# Patient Record
Sex: MALE | Race: WHITE | NOT HISPANIC OR LATINO
[De-identification: names, ages, dates, MRNs, and addresses within clinical notes are randomized per-mention and may not be internally consistent; named-entity substitution may affect disease eponyms.]

---

## 2017-01-19 ENCOUNTER — APPOINTMENT (OUTPATIENT)
Dept: INTERNAL MEDICINE | Facility: CLINIC | Age: 82
End: 2017-01-19

## 2017-01-25 ENCOUNTER — APPOINTMENT (OUTPATIENT)
Dept: CT IMAGING | Facility: CLINIC | Age: 82
End: 2017-01-25

## 2017-01-25 ENCOUNTER — OUTPATIENT (OUTPATIENT)
Dept: OUTPATIENT SERVICES | Facility: HOSPITAL | Age: 82
LOS: 1 days | End: 2017-01-25
Payer: MEDICARE

## 2017-01-25 DIAGNOSIS — Z00.8 ENCOUNTER FOR OTHER GENERAL EXAMINATION: ICD-10-CM

## 2017-01-25 PROCEDURE — 71250 CT THORAX DX C-: CPT

## 2017-01-31 ENCOUNTER — OUTPATIENT (OUTPATIENT)
Dept: OUTPATIENT SERVICES | Facility: HOSPITAL | Age: 82
LOS: 1 days | End: 2017-01-31
Payer: MEDICARE

## 2017-01-31 VITALS
TEMPERATURE: 98 F | HEIGHT: 66 IN | SYSTOLIC BLOOD PRESSURE: 158 MMHG | OXYGEN SATURATION: 100 % | RESPIRATION RATE: 18 BRPM | DIASTOLIC BLOOD PRESSURE: 64 MMHG | WEIGHT: 186.95 LBS | HEART RATE: 60 BPM

## 2017-01-31 VITALS
WEIGHT: 186.95 LBS | OXYGEN SATURATION: 100 % | TEMPERATURE: 98 F | DIASTOLIC BLOOD PRESSURE: 64 MMHG | HEIGHT: 66 IN | SYSTOLIC BLOOD PRESSURE: 158 MMHG | RESPIRATION RATE: 12 BRPM | HEART RATE: 53 BPM

## 2017-01-31 DIAGNOSIS — Z98.890 OTHER SPECIFIED POSTPROCEDURAL STATES: Chronic | ICD-10-CM

## 2017-01-31 DIAGNOSIS — Z01.810 ENCOUNTER FOR PREPROCEDURAL CARDIOVASCULAR EXAMINATION: ICD-10-CM

## 2017-01-31 DIAGNOSIS — Z98.62 PERIPHERAL VASCULAR ANGIOPLASTY STATUS: Chronic | ICD-10-CM

## 2017-01-31 LAB
ANION GAP SERPL CALC-SCNC: 16 MMOL/L — SIGNIFICANT CHANGE UP (ref 5–17)
APTT BLD: 36.8 SEC — SIGNIFICANT CHANGE UP (ref 27.5–37.4)
BUN SERPL-MCNC: 61 MG/DL — HIGH (ref 8–20)
CALCIUM SERPL-MCNC: 8.8 MG/DL — SIGNIFICANT CHANGE UP (ref 8.6–10.2)
CHLORIDE SERPL-SCNC: 102 MMOL/L — SIGNIFICANT CHANGE UP (ref 98–107)
CHOLEST SERPL-MCNC: 147 MG/DL — SIGNIFICANT CHANGE UP (ref 110–199)
CO2 SERPL-SCNC: 21 MMOL/L — LOW (ref 22–29)
CREAT SERPL-MCNC: 3.57 MG/DL — HIGH (ref 0.5–1.3)
GLUCOSE SERPL-MCNC: 113 MG/DL — SIGNIFICANT CHANGE UP (ref 70–115)
HCT VFR BLD CALC: 30.4 % — LOW (ref 42–52)
HDLC SERPL-MCNC: 46 MG/DL — LOW
HGB BLD-MCNC: 10.4 G/DL — LOW (ref 14–18)
INR BLD: 1.12 RATIO — SIGNIFICANT CHANGE UP (ref 0.88–1.16)
LIPID PNL WITH DIRECT LDL SERPL: 69 MG/DL — SIGNIFICANT CHANGE UP
MCHC RBC-ENTMCNC: 29 PG — SIGNIFICANT CHANGE UP (ref 27–31)
MCHC RBC-ENTMCNC: 34.2 G/DL — SIGNIFICANT CHANGE UP (ref 32–36)
MCV RBC AUTO: 84.7 FL — SIGNIFICANT CHANGE UP (ref 80–94)
PLATELET # BLD AUTO: 216 K/UL — SIGNIFICANT CHANGE UP (ref 150–400)
POTASSIUM SERPL-MCNC: 4.4 MMOL/L — SIGNIFICANT CHANGE UP (ref 3.5–5.3)
POTASSIUM SERPL-SCNC: 4.4 MMOL/L — SIGNIFICANT CHANGE UP (ref 3.5–5.3)
PROTHROM AB SERPL-ACNC: 12.3 SEC — SIGNIFICANT CHANGE UP (ref 10–13.1)
RBC # BLD: 3.59 M/UL — LOW (ref 4.6–6.2)
RBC # FLD: 14 % — SIGNIFICANT CHANGE UP (ref 11–15.6)
SODIUM SERPL-SCNC: 139 MMOL/L — SIGNIFICANT CHANGE UP (ref 135–145)
TOTAL CHOLESTEROL/HDL RATIO MEASUREMENT: 3 RATIO — LOW (ref 3.4–9.6)
TRIGL SERPL-MCNC: 161 MG/DL — SIGNIFICANT CHANGE UP (ref 10–200)
WBC # BLD: 10.29 K/UL — SIGNIFICANT CHANGE UP (ref 4.8–10.8)
WBC # FLD AUTO: 10.29 K/UL — SIGNIFICANT CHANGE UP (ref 4.8–10.8)

## 2017-01-31 PROCEDURE — 85610 PROTHROMBIN TIME: CPT

## 2017-01-31 PROCEDURE — 80061 LIPID PANEL: CPT

## 2017-01-31 PROCEDURE — G0463: CPT

## 2017-01-31 PROCEDURE — 85730 THROMBOPLASTIN TIME PARTIAL: CPT

## 2017-01-31 PROCEDURE — 36415 COLL VENOUS BLD VENIPUNCTURE: CPT

## 2017-01-31 PROCEDURE — 85027 COMPLETE CBC AUTOMATED: CPT

## 2017-01-31 PROCEDURE — 93010 ELECTROCARDIOGRAM REPORT: CPT

## 2017-01-31 PROCEDURE — 93005 ELECTROCARDIOGRAM TRACING: CPT

## 2017-01-31 PROCEDURE — 80048 BASIC METABOLIC PNL TOTAL CA: CPT

## 2017-01-31 NOTE — H&P CARDIOLOGY - ATTENDING COMMENTS
I advised him to hold the glipizide the day of the procedure  I also advised him to I advised him to hold the glipizide the day of the procedure  I also advised him to arrive 2 hours early for hydration with NAHCO3 in 1/2 saline over 2 hours at 150cc hr

## 2017-01-31 NOTE — ASU PATIENT PROFILE, ADULT - PSH
H/O circumcision  at  age  65  H/O left knee surgery H/O angioplasty  2013,  no  intervention  H/O circumcision  at  age  65  H/O left knee surgery

## 2017-01-31 NOTE — ASU PATIENT PROFILE, ADULT - PMH
Arrhythmia    AV block, 1st degree    CAD (coronary artery disease)    CKD (chronic kidney disease)  stage IV  DM (diabetes mellitus)    RICHARDS (dyspnea on exertion)    HTN (hypertension)    VT (ventricular tachycardia)

## 2017-01-31 NOTE — H&P CARDIOLOGY - HISTORY OF PRESENT ILLNESS
This is a 83 yo male with PMHX that includes HTN, HLD, DM, Stage 4 kidney disease.  He has complaints of  RICHARDS that has progressively been getting worse.  He had a prior cath in 2013 with minimal disease 20% rca , lad 20%, 20% lcx. .  Today he is here for PST in anticipation of having a RHC and LHC.  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     No recent ECHO or ST. This is a 81 yo male with PMHX that includes HTN, HLD, DM, Stage 4 kidney disease.  He has complaints of  RICHARDS that has progressively been getting worse.  He had a prior cath in 2013 with minimal disease 20% rca , lad 20%, 20% lcx. .  Today he is here for PST in anticipation of having a RHC and LHC.  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     No recent ECHO or ST.  He is also under the care of Dr Avendano Neph[facility]

## 2017-02-01 DIAGNOSIS — I25.10 ATHEROSCLEROTIC HEART DISEASE OF NATIVE CORONARY ARTERY WITHOUT ANGINA PECTORIS: ICD-10-CM

## 2017-02-01 DIAGNOSIS — Z01.810 ENCOUNTER FOR PREPROCEDURAL CARDIOVASCULAR EXAMINATION: ICD-10-CM

## 2017-02-03 ENCOUNTER — OUTPATIENT (OUTPATIENT)
Dept: OUTPATIENT SERVICES | Facility: HOSPITAL | Age: 82
LOS: 1 days | Discharge: HOME | End: 2017-02-03
Payer: MEDICARE

## 2017-02-03 ENCOUNTER — TRANSCRIPTION ENCOUNTER (OUTPATIENT)
Age: 82
End: 2017-02-03

## 2017-02-03 VITALS — RESPIRATION RATE: 18 BRPM | SYSTOLIC BLOOD PRESSURE: 180 MMHG | DIASTOLIC BLOOD PRESSURE: 85 MMHG | HEART RATE: 66 BPM

## 2017-02-03 VITALS
RESPIRATION RATE: 18 BRPM | SYSTOLIC BLOOD PRESSURE: 180 MMHG | TEMPERATURE: 98 F | HEART RATE: 57 BPM | OXYGEN SATURATION: 100 % | DIASTOLIC BLOOD PRESSURE: 82 MMHG

## 2017-02-03 DIAGNOSIS — Z98.890 OTHER SPECIFIED POSTPROCEDURAL STATES: Chronic | ICD-10-CM

## 2017-02-03 DIAGNOSIS — I47.2 VENTRICULAR TACHYCARDIA: ICD-10-CM

## 2017-02-03 DIAGNOSIS — Z98.62 PERIPHERAL VASCULAR ANGIOPLASTY STATUS: Chronic | ICD-10-CM

## 2017-02-03 LAB
ALBUMIN SERPL ELPH-MCNC: 3.5 G/DL — SIGNIFICANT CHANGE UP (ref 3.3–5.2)
ANION GAP SERPL CALC-SCNC: 14 MMOL/L — SIGNIFICANT CHANGE UP (ref 5–17)
BUN SERPL-MCNC: 64 MG/DL — HIGH (ref 8–20)
CALCIUM SERPL-MCNC: 9 MG/DL — SIGNIFICANT CHANGE UP (ref 8.6–10.2)
CHLORIDE SERPL-SCNC: 106 MMOL/L — SIGNIFICANT CHANGE UP (ref 98–107)
CO2 SERPL-SCNC: 22 MMOL/L — SIGNIFICANT CHANGE UP (ref 22–29)
CREAT SERPL-MCNC: 3.24 MG/DL — HIGH (ref 0.5–1.3)
GLUCOSE SERPL-MCNC: 220 MG/DL — HIGH (ref 70–115)
PHOSPHATE SERPL-MCNC: 4 MG/DL — SIGNIFICANT CHANGE UP (ref 2.4–4.7)
POTASSIUM SERPL-MCNC: 3.9 MMOL/L — SIGNIFICANT CHANGE UP (ref 3.5–5.3)
POTASSIUM SERPL-SCNC: 3.9 MMOL/L — SIGNIFICANT CHANGE UP (ref 3.5–5.3)
SODIUM SERPL-SCNC: 142 MMOL/L — SIGNIFICANT CHANGE UP (ref 135–145)

## 2017-02-03 PROCEDURE — C1894: CPT

## 2017-02-03 PROCEDURE — C1769: CPT

## 2017-02-03 PROCEDURE — C1889: CPT

## 2017-02-03 PROCEDURE — 80069 RENAL FUNCTION PANEL: CPT

## 2017-02-03 PROCEDURE — C1887: CPT

## 2017-02-03 PROCEDURE — 93460 R&L HRT ART/VENTRICLE ANGIO: CPT

## 2017-02-03 PROCEDURE — 99218: CPT

## 2017-02-03 PROCEDURE — 36415 COLL VENOUS BLD VENIPUNCTURE: CPT

## 2017-02-03 RX ORDER — DIPHENHYDRAMINE HCL 50 MG
25 CAPSULE ORAL ONCE
Qty: 0 | Refills: 0 | Status: COMPLETED | OUTPATIENT
Start: 2017-02-03 | End: 2017-02-03

## 2017-02-03 RX ORDER — ACETYLCYSTEINE 200 MG/ML
1200 VIAL (ML) MISCELLANEOUS EVERY 12 HOURS
Qty: 0 | Refills: 0 | Status: DISCONTINUED | OUTPATIENT
Start: 2017-02-03 | End: 2017-02-03

## 2017-02-03 RX ORDER — FAMOTIDINE 10 MG/ML
20 INJECTION INTRAVENOUS DAILY
Qty: 0 | Refills: 0 | Status: DISCONTINUED | OUTPATIENT
Start: 2017-02-03 | End: 2017-02-18

## 2017-02-03 RX ORDER — SODIUM CHLORIDE 9 MG/ML
1000 INJECTION, SOLUTION INTRAVENOUS
Qty: 0 | Refills: 0 | Status: DISCONTINUED | OUTPATIENT
Start: 2017-02-03 | End: 2017-02-03

## 2017-02-03 RX ORDER — ACETYLCYSTEINE 200 MG/ML
2 VIAL (ML) MISCELLANEOUS
Qty: 8 | Refills: 0
Start: 2017-02-03 | End: 2017-02-05

## 2017-02-03 RX ORDER — CLOPIDOGREL BISULFATE 75 MG/1
75 TABLET, FILM COATED ORAL DAILY
Qty: 0 | Refills: 0 | Status: DISCONTINUED | OUTPATIENT
Start: 2017-02-03 | End: 2017-02-18

## 2017-02-03 RX ORDER — SODIUM BICARBONATE 1 MEQ/ML
0.15 SYRINGE (ML) INTRAVENOUS
Qty: 150 | Refills: 0 | Status: DISCONTINUED | OUTPATIENT
Start: 2017-02-03 | End: 2017-02-18

## 2017-02-03 RX ORDER — FAMOTIDINE 10 MG/ML
1 INJECTION INTRAVENOUS
Qty: 30 | Refills: 0
Start: 2017-02-03 | End: 2017-03-05

## 2017-02-03 RX ORDER — CLOPIDOGREL BISULFATE 75 MG/1
75 TABLET, FILM COATED ORAL
Qty: 6750 | Refills: 3
Start: 2017-02-03 | End: 2018-01-28

## 2017-02-03 RX ORDER — ASPIRIN/CALCIUM CARB/MAGNESIUM 324 MG
243 TABLET ORAL ONCE
Qty: 0 | Refills: 0 | Status: COMPLETED | OUTPATIENT
Start: 2017-02-03 | End: 2017-02-03

## 2017-02-03 RX ORDER — ACETYLCYSTEINE 200 MG/ML
1200 VIAL (ML) MISCELLANEOUS ONCE
Qty: 0 | Refills: 0 | Status: COMPLETED | OUTPATIENT
Start: 2017-02-03 | End: 2017-02-03

## 2017-02-03 RX ORDER — DIPHENHYDRAMINE HCL 50 MG
25 CAPSULE ORAL
Qty: 525 | Refills: 0
Start: 2017-02-03 | End: 2017-02-10

## 2017-02-03 RX ORDER — ACETYLCYSTEINE 200 MG/ML
1200 VIAL (ML) MISCELLANEOUS EVERY 12 HOURS
Qty: 0 | Refills: 0 | Status: DISCONTINUED | OUTPATIENT
Start: 2017-02-03 | End: 2017-02-18

## 2017-02-03 RX ORDER — CLOPIDOGREL BISULFATE 75 MG/1
1 TABLET, FILM COATED ORAL
Qty: 90 | Refills: 3
Start: 2017-02-03 | End: 2018-01-28

## 2017-02-03 RX ADMIN — Medication 85 MEQ/KG/HR: at 16:54

## 2017-02-03 RX ADMIN — SODIUM CHLORIDE 150 MILLILITER(S): 9 INJECTION, SOLUTION INTRAVENOUS at 09:57

## 2017-02-03 RX ADMIN — Medication 85 MEQ/KG/HR: at 18:01

## 2017-02-03 RX ADMIN — Medication 1200 MILLIGRAM(S): at 18:01

## 2017-02-03 RX ADMIN — Medication 1200 MILLIGRAM(S): at 11:31

## 2017-02-03 RX ADMIN — SODIUM CHLORIDE 150 MILLILITER(S): 9 INJECTION, SOLUTION INTRAVENOUS at 11:31

## 2017-02-03 RX ADMIN — Medication 40 MILLIGRAM(S): at 19:32

## 2017-02-03 RX ADMIN — Medication 85 MEQ/KG/HR: at 11:31

## 2017-02-03 RX ADMIN — Medication 25 MILLIGRAM(S): at 19:28

## 2017-02-03 RX ADMIN — FAMOTIDINE 20 MILLIGRAM(S): 10 INJECTION INTRAVENOUS at 19:28

## 2017-02-03 RX ADMIN — CLOPIDOGREL BISULFATE 75 MILLIGRAM(S): 75 TABLET, FILM COATED ORAL at 16:54

## 2017-02-03 RX ADMIN — Medication 243 MILLIGRAM(S): at 09:46

## 2017-02-03 NOTE — DISCHARGE NOTE ADULT - CARE PLAN
Principal Discharge DX:	CAD (coronary artery disease)  Goal:	optimal cardiac function  Instructions for follow-up, activity and diet:	Choose lean meats and poultry without skin and prepare them without added saturated and trans fat.  Eat fish at least twice a week. Recent research shows that eating oily fish containing omega-3 fatty acids (for example, salmon, trout and herring) may help lower your risk of death from coronary artery disease.  Select fat-free, 1 percent fat and low-fat dairy products.  Cut back on foods containing partially hydrogenated vegetable oils to reduce trans fat in your diet.   To lower cholesterol, reduce saturated fat to no more than 5 to 6 percent of total calories. For someone eating 2,000 calories a day, that’s about 13 grams of saturated fat.  Cut back on beverages and foods with added sugars.  Choose and prepare foods with little or no salt. To lower blood pressure, aim to eat no more than 2,400 milligrams of sodium per day. Reducing daily intake to 1,500 mg is desirable because it can lower blood pressure even further.  If you drink alcohol, drink in moderation. That means one drink per day if you’re a woman and two drinks  per day if you’re a man.  Follow the American Heart Association recommendations when you eat out, and keep an eye on your portion sizes.  ADA food choices Principal Discharge DX:	CAD (coronary artery disease)  Goal:	optimal cardiac function  Instructions for follow-up, activity and diet:	Choose lean meats and poultry without skin and prepare them without added saturated and trans fat.  Eat fish at least twice a week. Recent research shows that eating oily fish containing omega-3 fatty acids (for example, salmon, trout and herring) may help lower your risk of death from coronary artery disease.  Select fat-free, 1 percent fat and low-fat dairy products.  Cut back on foods containing partially hydrogenated vegetable oils to reduce trans fat in your diet.   To lower cholesterol, reduce saturated fat to no more than 5 to 6 percent of total calories. For someone eating 2,000 calories a day, that’s about 13 grams of saturated fat.  Cut back on beverages and foods with added sugars.  Choose and prepare foods with little or no salt. To lower blood pressure, aim to eat no more than 2,400 milligrams of sodium per day. Reducing daily intake to 1,500 mg is desirable because it can lower blood pressure even further.  If you drink alcohol, drink in moderation. That means one drink per day if you’re a woman and two drinks  per day if you’re a man.  Follow the American Heart Association recommendations when you eat out, and keep an eye on your portion sizes.  ADA food choices  Instructions for follow-up, activity and diet:	P/U Mucomyst prescription at [facility]  P/U plavix at [facility] that was electronically sent Principal Discharge DX:	CAD (coronary artery disease)  Goal:	optimal cardiac function  Instructions for follow-up, activity and diet:	Choose lean meats and poultry without skin and prepare them without added saturated and trans fat.  Eat fish at least twice a week. Recent research shows that eating oily fish containing omega-3 fatty acids (for example, salmon, trout and herring) may help lower your risk of death from coronary artery disease.  Select fat-free, 1 percent fat and low-fat dairy products.  Cut back on foods containing partially hydrogenated vegetable oils to reduce trans fat in your diet.   To lower cholesterol, reduce saturated fat to no more than 5 to 6 percent of total calories. For someone eating 2,000 calories a day, that’s about 13 grams of saturated fat.  Cut back on beverages and foods with added sugars.  Choose and prepare foods with little or no salt. To lower blood pressure, aim to eat no more than 2,400 milligrams of sodium per day. Reducing daily intake to 1,500 mg is desirable because it can lower blood pressure even further.  If you drink alcohol, drink in moderation. That means one drink per day if you’re a woman and two drinks  per day if you’re a man.  Follow the American Heart Association recommendations when you eat out, and keep an eye on your portion sizes.  ADA food choices  Instructions for follow-up, activity and diet:	P/U Mucomyst prescription at [facility]  P/U plavix at [facility] that was electronically sent Principal Discharge DX:	CAD (coronary artery disease)  Goal:	optimal cardiac function  Instructions for follow-up, activity and diet:	Choose lean meats and poultry without skin and prepare them without added saturated and trans fat.  Eat fish at least twice a week. Recent research shows that eating oily fish containing omega-3 fatty acids (for example, salmon, trout and herring) may help lower your risk of death from coronary artery disease.  Select fat-free, 1 percent fat and low-fat dairy products.  Cut back on foods containing partially hydrogenated vegetable oils to reduce trans fat in your diet.   To lower cholesterol, reduce saturated fat to no more than 5 to 6 percent of total calories. For someone eating 2,000 calories a day, that’s about 13 grams of saturated fat.  Cut back on beverages and foods with added sugars.  Choose and prepare foods with little or no salt. To lower blood pressure, aim to eat no more than 2,400 milligrams of sodium per day. Reducing daily intake to 1,500 mg is desirable because it can lower blood pressure even further.  If you drink alcohol, drink in moderation. That means one drink per day if you’re a woman and two drinks  per day if you’re a man.  Follow the American Heart Association recommendations when you eat out, and keep an eye on your portion sizes.  ADA food choices  Instructions for follow-up, activity and diet:	P/U Mucomyst prescription at [facility]  P/U plavix at [facility] that was electronically sent Principal Discharge DX:	CAD (coronary artery disease)  Goal:	optimal cardiac function  Instructions for follow-up, activity and diet:	Choose lean meats and poultry without skin and prepare them without added saturated and trans fat.  Eat fish at least twice a week. Recent research shows that eating oily fish containing omega-3 fatty acids (for example, salmon, trout and herring) may help lower your risk of death from coronary artery disease.  Select fat-free, 1 percent fat and low-fat dairy products.  Cut back on foods containing partially hydrogenated vegetable oils to reduce trans fat in your diet.   To lower cholesterol, reduce saturated fat to no more than 5 to 6 percent of total calories. For someone eating 2,000 calories a day, that’s about 13 grams of saturated fat.  Cut back on beverages and foods with added sugars.  Choose and prepare foods with little or no salt. To lower blood pressure, aim to eat no more than 2,400 milligrams of sodium per day. Reducing daily intake to 1,500 mg is desirable because it can lower blood pressure even further.  If you drink alcohol, drink in moderation. That means one drink per day if you’re a woman and two drinks  per day if you’re a man.  Follow the American Heart Association recommendations when you eat out, and keep an eye on your portion sizes.  ADA food choices  Instructions for follow-up, activity and diet:	P/U Mucomyst prescription at [facility]  P/U plavix at [facility] that was electronically sent Principal Discharge DX:	CAD (coronary artery disease)  Goal:	optimal cardiac function  Instructions for follow-up, activity and diet:	Choose lean meats and poultry without skin and prepare them without added saturated and trans fat.  Eat fish at least twice a week. Recent research shows that eating oily fish containing omega-3 fatty acids (for example, salmon, trout and herring) may help lower your risk of death from coronary artery disease.  Select fat-free, 1 percent fat and low-fat dairy products.  Cut back on foods containing partially hydrogenated vegetable oils to reduce trans fat in your diet.   To lower cholesterol, reduce saturated fat to no more than 5 to 6 percent of total calories. For someone eating 2,000 calories a day, that’s about 13 grams of saturated fat.  Cut back on beverages and foods with added sugars.  Choose and prepare foods with little or no salt. To lower blood pressure, aim to eat no more than 2,400 milligrams of sodium per day. Reducing daily intake to 1,500 mg is desirable because it can lower blood pressure even further.  If you drink alcohol, drink in moderation. That means one drink per day if you’re a woman and two drinks  per day if you’re a man.  Follow the American Heart Association recommendations when you eat out, and keep an eye on your portion sizes.  ADA food choices  Instructions for follow-up, activity and diet:	P/U Mucomyst prescription at [facility]  P/U plavix at [facility] that was electronically sent Principal Discharge DX:	CAD (coronary artery disease)  Goal:	optimal cardiac function  Instructions for follow-up, activity and diet:	Choose lean meats and poultry without skin and prepare them without added saturated and trans fat.  Eat fish at least twice a week. Recent research shows that eating oily fish containing omega-3 fatty acids (for example, salmon, trout and herring) may help lower your risk of death from coronary artery disease.  Select fat-free, 1 percent fat and low-fat dairy products.  Cut back on foods containing partially hydrogenated vegetable oils to reduce trans fat in your diet.   To lower cholesterol, reduce saturated fat to no more than 5 to 6 percent of total calories. For someone eating 2,000 calories a day, that’s about 13 grams of saturated fat.  Cut back on beverages and foods with added sugars.  Choose and prepare foods with little or no salt. To lower blood pressure, aim to eat no more than 2,400 milligrams of sodium per day. Reducing daily intake to 1,500 mg is desirable because it can lower blood pressure even further.  If you drink alcohol, drink in moderation. That means one drink per day if you’re a woman and two drinks  per day if you’re a man.  Follow the American Heart Association recommendations when you eat out, and keep an eye on your portion sizes.  ADA food choices  Instructions for follow-up, activity and diet:	P/U Mucomyst prescription at [facility]  P/U plavix at [facility] that was electronically sent

## 2017-02-03 NOTE — DISCHARGE NOTE ADULT - PATIENT PORTAL LINK FT
“You can access the FollowHealth Patient Portal, offered by [facility], by registering with the following website: [email or web address]”

## 2017-02-03 NOTE — DISCHARGE NOTE ADULT - CARE PROVIDER_API CALL
Mango Peterson), Cardiovascular Disease; Internal Medicine  [address] [place] [address]  Phone: [phone]  Fax: [phone]

## 2017-02-03 NOTE — DISCHARGE NOTE ADULT - NS AS ACTIVITY OBS
Walking-Indoors allowed/Do not make important decisions/No Heavy lifting/straining/Do not drive or operate machinery

## 2017-02-03 NOTE — DISCHARGE NOTE ADULT - MEDICATION SUMMARY - MEDICATIONS TO TAKE
I will START or STAY ON the medications listed below when I get home from the hospital:    aspirin 81 mg oral tablet  -- 1 tab(s) by mouth once a day  -- Indication: For Ventricular tachycardia    flecainide 100 mg oral tablet  -- 1 tab(s) by mouth every 12 hours  -- Indication: For Ventricular tachycardia    glipiZIDE 5 mg oral tablet  -- 1 tab(s) by mouth once a day  -- Indication: For Ventricular tachycardia    acetylcysteine 600 mg oral capsule  -- 2 cap(s) by mouth 2 times a day  -- Take with food or milk.    -- Indication: For Ventricular tachycardia    atenolol 25 mg oral tablet  -- 0.5 tab(s) by mouth once a day  -- Indication: For Ventricular tachycardia    NIFEdipine 60 mg oral tablet, extended release  -- 1 tab(s) by mouth once a day  -- Indication: For Ventricular tachycardia    torsemide 10 mg oral tablet  -- 1 tab(s) by mouth once a day  -- Indication: For Ventricular tachycardia I will START or STAY ON the medications listed below when I get home from the hospital:    plavix  -- 75 milligram(s) by mouth once a day MDD:75  -- Indication: For CAD (coronary artery disease)    aspirin 81 mg oral tablet  -- 1 tab(s) by mouth once a day  -- Indication: For Ventricular tachycardia    flecainide 100 mg oral tablet  -- 1 tab(s) by mouth every 12 hours  -- Indication: For Ventricular tachycardia    glipiZIDE 5 mg oral tablet  -- 1 tab(s) by mouth once a day  -- Indication: For Ventricular tachycardia    acetylcysteine 600 mg oral capsule  -- 2 cap(s) by mouth 2 times a day  -- Take with food or milk.    -- Indication: For Ventricular tachycardia    Plavix 75 mg oral tablet  -- 1 tab(s) by mouth once a day MDD:75  -- Do not take aspirin or aspirin containing products without knowledge and consent of your physician.    -- Indication: For CAD (coronary artery disease)    atenolol 25 mg oral tablet  -- 0.5 tab(s) by mouth once a day  -- Indication: For Ventricular tachycardia    NIFEdipine 60 mg oral tablet, extended release  -- 1 tab(s) by mouth once a day  -- Indication: For Ventricular tachycardia    torsemide 10 mg oral tablet  -- 1 tab(s) by mouth once a day  -- Indication: For Ventricular tachycardia I will START or STAY ON the medications listed below when I get home from the hospital:    prednisone  -- 40 milligram(s) by mouth once a day  -- Indication: For CAD (coronary artery disease)    benadryl  -- 25 milligram(s) by mouth 3 times a day  -- Indication: For CAD (coronary artery disease)    aspirin 81 mg oral tablet  -- 1 tab(s) by mouth once a day  -- Indication: For Ventricular tachycardia    flecainide 100 mg oral tablet  -- 1 tab(s) by mouth every 12 hours  -- Indication: For Ventricular tachycardia    glipiZIDE 5 mg oral tablet  -- 1 tab(s) by mouth once a day  -- Indication: For Ventricular tachycardia    acetylcysteine 600 mg oral capsule  -- 2 cap(s) by mouth 2 times a day  -- Take with food or milk.    -- Indication: For Ventricular tachycardia    Plavix 75 mg oral tablet  -- 1 tab(s) by mouth once a day MDD:75  -- Do not take aspirin or aspirin containing products without knowledge and consent of your physician.    -- Indication: For CAD (coronary artery disease)    atenolol 25 mg oral tablet  -- 0.5 tab(s) by mouth once a day  -- Indication: For Ventricular tachycardia    NIFEdipine 60 mg oral tablet, extended release  -- 1 tab(s) by mouth once a day  -- Indication: For Ventricular tachycardia    torsemide 10 mg oral tablet  -- 1 tab(s) by mouth once a day  -- Indication: For Ventricular tachycardia    Pepcid 40 mg oral tablet  -- 1 tab(s) by mouth once a day (at bedtime)  -- It is very important that you take or use this exactly as directed.  Do not skip doses or discontinue unless directed by your doctor.  Obtain medical advice before taking any non-prescription drugs as some may affect the action of this medication.    -- Indication: For CAD (coronary artery disease)

## 2017-02-03 NOTE — DISCHARGE NOTE ADULT - HOSPITAL COURSE
s/p right and left cardiac catheterization via RFV/RFA #5, #7  Tolerated procedure well and seen post procedure by Dr. Manriquez with family at bedside. D/W pt. & family outpt PET scan  REVIEW OF SYSTEMS:  Denies SOB, CP, NV, HA, dizziness, palpitations, site pain  PHYSICAL EXAM: A&Ox3 NAD Skin warm and dry  NEURO: Speech intact +gag +swallow Tongue midline MARQUEZ  NECK: No JVD, trachea midline. Eupneic  HEART: NSR/PACs sl irreg  PULMONARY:  CTA ronda  ABDOMEN: Soft nontender X4 +BS Vdg/eating  EXTREMITIES:RFA site: No bleed, hematoma, pain, ecchymosis or swelling Rt DP/PT+

## 2017-02-03 NOTE — DISCHARGE NOTE ADULT - INSTRUCTIONS
Activities as tolerated minimize stair climbing, heavy lifting greater than 10lbs, strenous house work, contact sports,No intercourse for 1 week. Site care no Bath tubs, Hot tubs , Pools for 1 week. Monitor site for infection such as warmth drainage or swelling of site. Monitor for bleeding call MD if continous bleeding occurs hold pressure report to nearest ER, Do not opperate heavy machinery or drive for 24hours.

## 2017-02-03 NOTE — DISCHARGE NOTE ADULT - PLAN OF CARE
optimal cardiac function Choose lean meats and poultry without skin and prepare them without added saturated and trans fat.  Eat fish at least twice a week. Recent research shows that eating oily fish containing omega-3 fatty acids (for example, salmon, trout and herring) may help lower your risk of death from coronary artery disease.  Select fat-free, 1 percent fat and low-fat dairy products.  Cut back on foods containing partially hydrogenated vegetable oils to reduce trans fat in your diet.   To lower cholesterol, reduce saturated fat to no more than 5 to 6 percent of total calories. For someone eating 2,000 calories a day, that’s about 13 grams of saturated fat.  Cut back on beverages and foods with added sugars.  Choose and prepare foods with little or no salt. To lower blood pressure, aim to eat no more than 2,400 milligrams of sodium per day. Reducing daily intake to 1,500 mg is desirable because it can lower blood pressure even further.  If you drink alcohol, drink in moderation. That means one drink per day if you’re a woman and two drinks  per day if you’re a man.  Follow the American Heart Association recommendations when you eat out, and keep an eye on your portion sizes.  ADA food choices P/U Mucomyst prescription at [facility]  P/U plavix at [facility] that was electronically sent

## 2017-02-04 DIAGNOSIS — I47.2 VENTRICULAR TACHYCARDIA: ICD-10-CM

## 2017-02-04 DIAGNOSIS — I25.118 ATHEROSCLEROTIC HEART DISEASE OF NATIVE CORONARY ARTERY WITH OTHER FORMS OF ANGINA PECTORIS: ICD-10-CM

## 2017-02-04 DIAGNOSIS — I10 ESSENTIAL (PRIMARY) HYPERTENSION: ICD-10-CM

## 2017-02-04 DIAGNOSIS — N18.4 CHRONIC KIDNEY DISEASE, STAGE 4 (SEVERE): ICD-10-CM

## 2017-02-04 NOTE — CONSULT NOTE ADULT - SUBJECTIVE AND OBJECTIVE BOX
Patient is a 82y old  Male who presented to undergo  right and left heart catheterization (03 Feb 2017 13:19)      HPI:      PAST MEDICAL & SURGICAL HISTORY:  RICHARDS (dyspnea on exertion)  VT (ventricular tachycardia)  HTN (hypertension)  CAD (coronary artery disease)  CKD (chronic kidney disease): stage IV  Arrhythmia  AV block, 1st degree  DM (diabetes mellitus)  H/O angioplasty: 2013,  no  intervention  H/O left knee surgery  H/O circumcision: at  age  65      FAMILY HISTORY: N.R.      Social History: Non Smoker,    MEDICATIONS  (STANDING):  sodium bicarbonate  Infusion 0.148mEq/kG/Hr IV Continuous <Continuous>  acetylcysteine  Oral Solution 1200milliGRAM(s) Oral every 12 hours  clopidogrel Tablet 75milliGRAM(s) Oral daily  famotidine    Tablet 20milliGRAM(s) Oral daily        Allergies    Toprol-XL (Rash)    REVIEW OF SYSTEMS:    CONSTITUTIONAL: No fever, weight loss, or fatigue  EYES: No eye pain, visual disturbances, or discharge  NECK: No pain or stiffness  BREASTS: No pain, masses, or nipple discharge  RESPIRATORY: No cough, wheezing, chills or hemoptysis; No shortness of breath  CARDIOVASCULAR: No chest pain, palpitations, dizziness, or leg swelling  GASTROINTESTINAL: No abdominal or epigastric pain. No nausea, vomiting, or hematemesis; No diarrhea or constipation. No melena or hematochezia.  GENITOURINARY: No dysuria, frequency, hematuria, or incontinence  NEUROLOGICAL: No headaches, memory loss, loss of strength, numbness, or tremors  SKIN: No itching, burning, rashes, or lesions   LYMPH NODES: No enlarged glands  ENDOCRINE: No heat or cold intolerance; No hair loss  MUSCULOSKELETAL: No joint pain or swelling; No muscle, back, or extremity pain  PSYCHIATRIC: No depression, anxiety, mood swings, or difficulty sleeping  HEME/LYMPH: No easy bruising, or bleeding gums        Vital Signs Last 24 Hrs  T(C): 36.8, Max: 36.8 (02-03 @ 09:30)  T(F): 98.3, Max: 98.3 (02-03 @ 09:30)  HR: 66 (48 - 66)  BP: 180/85 (152/70 - 189/81)  BP(mean): --  RR: 18 (16 - 18)  SpO2: 96% (96% - 100%)    PHYSICAL EXAM:    GENERAL: NAD, well-groomed, well-developed  HEAD:  Atraumatic, Normocephalic  EYES: EOMI, PERRLA, conjunctiva and sclera clear  ENMT: No tonsillar erythema, exudates, or enlargement; Moist mucous membranes, Good dentition, No lesions  NECK: Supple, No JVD, Normal thyroid  NERVOUS SYSTEM:  Alert & Oriented X3, Good concentration; Motor Strength 5/5 B/L upper and lower extremities; DTRs 2+ intact and symmetric  CHEST/LUNG: Clear to percussion bilaterally; No rales, rhonchi, wheezing, or rubs  HEART: Regular rate and rhythm; No murmurs, rubs, or gallops  ABDOMEN: Soft, Nontender, Nondistended; Bowel sounds present  EXTREMITIES:  2+ Peripheral Pulses, No clubbing, cyanosis, or edema  LYMPH: No lymphadenopathy noted  SKIN: No rashes or lesions      LABS:    03 Feb 2017 18:25    142    |  106    |  64.0   ----------------------------<  220    3.9     |  22.0   |  3.24     Ca    9.0        03 Feb 2017 18:25  Phos  4.0       03 Feb 2017 18:25    TPro  x      /  Alb  3.5    /  TBili  x      /  DBili  x      /  AST  x      /  ALT  x      /  AlkPhos  x      03 Feb 2017 18:25        Phosphorus Level, Serum: 4.0 mg/dL (02-03 @ 18:25)      RADIOLOGY & ADDITIONAL TESTS:

## 2017-02-07 NOTE — PROGRESS NOTE ADULT - SUBJECTIVE AND OBJECTIVE BOX
[facility]                                   [address] [place] [address]                                                      PHONE: [phone]                                                         FAX: [phone]  [email or web address]  ---------------------------------------------------------------------------------------------------------------------------------          Patient seen, consent obtained and agree with cath.  Risk and benefit explained and all question answered    Star Manriquez MD    Office [phone]  Cell     [phone]
I have seen and examined this patient. Consent obtained. Agree with cardiac cath. Risks and benefits fully explained. All questions answered.    Star Manriquez MD
Renal :      S/P Catheterization ( Visipaque 135 ml., )    Findings noted,     Will F.U in office ,    Monitor U.O & Scr., in 48-72 Hours,
Renal :    Discussed angeli Ireland ( NP _ Cardiology )    On IV Isotonic HCO3 infusion, & Oral NAC    L & R Heart catheterization this PM,    Will  Monitor,
at time of discharge 1900 patient noted to have macular rash slightly pruritic over flanks and small area of raised hive LAC.  Airway patent No facial swelling.  No respiratory deviance.  Patient and daughter state patient felt "itchy" prior to plavix.  May be from mucomyst/contrast.  More likely contrast  STAT prednisone, benadryl  and pepcid given.  Communicated to same to Dr. Manriquez  Had patient stay add'l hour for observation. No further increase or change. Pt discharged w/one week of medications

## 2017-02-10 ENCOUNTER — APPOINTMENT (OUTPATIENT)
Dept: NEPHROLOGY | Facility: CLINIC | Age: 82
End: 2017-02-10

## 2017-02-10 VITALS
HEIGHT: 66 IN | WEIGHT: 190 LBS | BODY MASS INDEX: 30.53 KG/M2 | SYSTOLIC BLOOD PRESSURE: 138 MMHG | DIASTOLIC BLOOD PRESSURE: 60 MMHG

## 2017-02-14 RX ORDER — VIT B COMP NO.3/FOLIC/C/BIOTIN 1 MG-60 MG
1 TABLET ORAL
Qty: 90 | Refills: 3 | Status: DISCONTINUED | COMMUNITY
Start: 2017-02-10 | End: 2017-02-14

## 2017-03-23 ENCOUNTER — APPOINTMENT (OUTPATIENT)
Dept: NEPHROLOGY | Facility: CLINIC | Age: 82
End: 2017-03-23

## 2017-03-23 LAB — HEMOGLOBIN: 9.2

## 2017-03-23 RX ORDER — NIFEDIPINE 60 MG/1
60 TABLET, FILM COATED, EXTENDED RELEASE ORAL
Qty: 30 | Refills: 0 | Status: DISCONTINUED | COMMUNITY
Start: 2017-01-30 | End: 2017-03-23

## 2017-03-23 RX ORDER — PREDNISONE 20 MG/1
20 TABLET ORAL
Qty: 15 | Refills: 0 | Status: DISCONTINUED | COMMUNITY
Start: 2017-02-07 | End: 2017-03-23

## 2017-03-23 RX ORDER — ACETYLCYSTEINE 200 MG/ML
20 SOLUTION ORAL; RESPIRATORY (INHALATION)
Qty: 30 | Refills: 0 | Status: DISCONTINUED | COMMUNITY
Start: 2017-02-03 | End: 2017-03-23

## 2017-03-23 RX ADMIN — ERYTHROPOIETIN 1 UNIT/ML: 40000 INJECTION, SOLUTION INTRAVENOUS; SUBCUTANEOUS at 00:00

## 2017-03-24 ENCOUNTER — RX RENEWAL (OUTPATIENT)
Age: 82
End: 2017-03-24

## 2017-03-24 LAB
ALBUMIN SERPL ELPH-MCNC: 4 G/DL
ANION GAP SERPL CALC-SCNC: 16 MMOL/L
BASOPHILS # BLD AUTO: 0.1 K/UL
BASOPHILS NFR BLD AUTO: 0.9 %
BUN SERPL-MCNC: 62 MG/DL
CALCIUM SERPL-MCNC: 9.2 MG/DL
CHLORIDE SERPL-SCNC: 110 MMOL/L
CO2 SERPL-SCNC: 19 MMOL/L
CREAT SERPL-MCNC: 3.11 MG/DL
EOSINOPHIL # BLD AUTO: 0.23 K/UL
EOSINOPHIL NFR BLD AUTO: 2.1 %
GLUCOSE SERPL-MCNC: 62 MG/DL
HCT VFR BLD CALC: 30.6 %
HGB BLD-MCNC: 9.8 G/DL
IMM GRANULOCYTES NFR BLD AUTO: 0.2 %
LYMPHOCYTES # BLD AUTO: 1.77 K/UL
LYMPHOCYTES NFR BLD AUTO: 16.2 %
MAN DIFF?: NORMAL
MCHC RBC-ENTMCNC: 28.6 PG
MCHC RBC-ENTMCNC: 32 GM/DL
MCV RBC AUTO: 89.2 FL
MONOCYTES # BLD AUTO: 0.73 K/UL
MONOCYTES NFR BLD AUTO: 6.7 %
NEUTROPHILS # BLD AUTO: 8.06 K/UL
NEUTROPHILS NFR BLD AUTO: 73.9 %
PHOSPHATE SERPL-MCNC: 4.7 MG/DL
PLATELET # BLD AUTO: 243 K/UL
POTASSIUM SERPL-SCNC: 4.6 MMOL/L
RBC # BLD: 3.43 M/UL
RBC # FLD: 15.7 %
SODIUM SERPL-SCNC: 145 MMOL/L
WBC # FLD AUTO: 10.91 K/UL

## 2017-03-27 ENCOUNTER — APPOINTMENT (OUTPATIENT)
Dept: NEPHROLOGY | Facility: CLINIC | Age: 82
End: 2017-03-27

## 2017-04-24 ENCOUNTER — APPOINTMENT (OUTPATIENT)
Dept: NEPHROLOGY | Facility: CLINIC | Age: 82
End: 2017-04-24

## 2017-04-24 RX ORDER — CLOPIDOGREL BISULFATE 75 MG/1
75 TABLET, FILM COATED ORAL
Qty: 30 | Refills: 0 | Status: DISCONTINUED | COMMUNITY
Start: 2017-02-03 | End: 2017-04-24

## 2017-04-24 RX ORDER — FAMOTIDINE 40 MG/1
40 TABLET, FILM COATED ORAL
Qty: 30 | Refills: 0 | Status: DISCONTINUED | COMMUNITY
Start: 2017-02-03 | End: 2017-04-24

## 2017-04-24 RX ORDER — HYDROXYZINE HYDROCHLORIDE 50 MG/1
50 TABLET ORAL
Qty: 15 | Refills: 0 | Status: DISCONTINUED | COMMUNITY
Start: 2017-02-07 | End: 2017-04-24

## 2017-04-25 LAB
ALBUMIN SERPL ELPH-MCNC: 4.2 G/DL
ANION GAP SERPL CALC-SCNC: 16 MMOL/L
BASOPHILS # BLD AUTO: 0.08 K/UL
BASOPHILS NFR BLD AUTO: 0.8 %
BUN SERPL-MCNC: 59 MG/DL
CALCIUM SERPL-MCNC: 9.4 MG/DL
CHLORIDE SERPL-SCNC: 111 MMOL/L
CO2 SERPL-SCNC: 17 MMOL/L
CREAT SERPL-MCNC: 2.78 MG/DL
EOSINOPHIL # BLD AUTO: 0.37 K/UL
EOSINOPHIL NFR BLD AUTO: 3.6 %
GLUCOSE SERPL-MCNC: 78 MG/DL
HCT VFR BLD CALC: 34.7 %
HGB BLD-MCNC: 10.8 G/DL
IMM GRANULOCYTES NFR BLD AUTO: 0.3 %
LYMPHOCYTES # BLD AUTO: 1.54 K/UL
LYMPHOCYTES NFR BLD AUTO: 15.1 %
MAN DIFF?: NORMAL
MCHC RBC-ENTMCNC: 28.4 PG
MCHC RBC-ENTMCNC: 31.1 GM/DL
MCV RBC AUTO: 91.3 FL
MONOCYTES # BLD AUTO: 0.54 K/UL
MONOCYTES NFR BLD AUTO: 5.3 %
NEUTROPHILS # BLD AUTO: 7.65 K/UL
NEUTROPHILS NFR BLD AUTO: 74.9 %
PHOSPHATE SERPL-MCNC: 4.8 MG/DL
PLATELET # BLD AUTO: 254 K/UL
POTASSIUM SERPL-SCNC: 4.3 MMOL/L
RBC # BLD: 3.8 M/UL
RBC # FLD: 16.4 %
SODIUM SERPL-SCNC: 144 MMOL/L
WBC # FLD AUTO: 10.21 K/UL

## 2017-05-19 ENCOUNTER — INPATIENT (INPATIENT)
Facility: HOSPITAL | Age: 82
LOS: 4 days | Discharge: HOME | DRG: 264 | End: 2017-05-24
Attending: INTERNAL MEDICINE
Payer: MEDICARE

## 2017-05-19 ENCOUNTER — APPOINTMENT (OUTPATIENT)
Dept: NEPHROLOGY | Facility: CLINIC | Age: 82
End: 2017-05-19

## 2017-05-19 VITALS
SYSTOLIC BLOOD PRESSURE: 175 MMHG | OXYGEN SATURATION: 99 % | HEIGHT: 65 IN | TEMPERATURE: 98 F | HEART RATE: 56 BPM | WEIGHT: 197.09 LBS | RESPIRATION RATE: 20 BRPM | DIASTOLIC BLOOD PRESSURE: 76 MMHG

## 2017-05-19 DIAGNOSIS — R06.09 OTHER FORMS OF DYSPNEA: ICD-10-CM

## 2017-05-19 DIAGNOSIS — I10 ESSENTIAL (PRIMARY) HYPERTENSION: ICD-10-CM

## 2017-05-19 DIAGNOSIS — Z98.62 PERIPHERAL VASCULAR ANGIOPLASTY STATUS: Chronic | ICD-10-CM

## 2017-05-19 DIAGNOSIS — Z98.890 OTHER SPECIFIED POSTPROCEDURAL STATES: Chronic | ICD-10-CM

## 2017-05-19 DIAGNOSIS — R60.0 LOCALIZED EDEMA: ICD-10-CM

## 2017-05-19 DIAGNOSIS — I47.2 VENTRICULAR TACHYCARDIA: ICD-10-CM

## 2017-05-19 DIAGNOSIS — N18.9 CHRONIC KIDNEY DISEASE, UNSPECIFIED: ICD-10-CM

## 2017-05-19 DIAGNOSIS — E11.9 TYPE 2 DIABETES MELLITUS WITHOUT COMPLICATIONS: ICD-10-CM

## 2017-05-19 LAB
ALBUMIN SERPL ELPH-MCNC: 4.2 G/DL — SIGNIFICANT CHANGE UP (ref 3.3–5.2)
ALP SERPL-CCNC: 132 U/L — HIGH (ref 40–120)
ALT FLD-CCNC: 37 U/L — SIGNIFICANT CHANGE UP
ANION GAP SERPL CALC-SCNC: 17 MMOL/L — SIGNIFICANT CHANGE UP (ref 5–17)
APPEARANCE UR: CLEAR — SIGNIFICANT CHANGE UP
APTT BLD: 36.3 SEC — SIGNIFICANT CHANGE UP (ref 27.5–37.4)
AST SERPL-CCNC: 26 U/L — SIGNIFICANT CHANGE UP
BASOPHILS # BLD AUTO: 0.1 K/UL — SIGNIFICANT CHANGE UP (ref 0–0.2)
BASOPHILS NFR BLD AUTO: 0.6 % — SIGNIFICANT CHANGE UP (ref 0–2)
BILIRUB SERPL-MCNC: 0.2 MG/DL — LOW (ref 0.4–2)
BILIRUB UR-MCNC: NEGATIVE — SIGNIFICANT CHANGE UP
BUN SERPL-MCNC: 69 MG/DL — HIGH (ref 8–20)
CALCIUM SERPL-MCNC: 8.8 MG/DL — SIGNIFICANT CHANGE UP (ref 8.6–10.2)
CHLORIDE SERPL-SCNC: 103 MMOL/L — SIGNIFICANT CHANGE UP (ref 98–107)
CO2 SERPL-SCNC: 20 MMOL/L — LOW (ref 22–29)
COLOR SPEC: YELLOW — SIGNIFICANT CHANGE UP
CREAT SERPL-MCNC: 3.26 MG/DL — HIGH (ref 0.5–1.3)
DIFF PNL FLD: NEGATIVE — SIGNIFICANT CHANGE UP
EOSINOPHIL # BLD AUTO: 0.2 K/UL — SIGNIFICANT CHANGE UP (ref 0–0.5)
EOSINOPHIL NFR BLD AUTO: 2 % — SIGNIFICANT CHANGE UP (ref 0–5)
GLUCOSE SERPL-MCNC: 119 MG/DL — HIGH (ref 70–115)
GLUCOSE UR QL: NEGATIVE MG/DL — SIGNIFICANT CHANGE UP
HCT VFR BLD CALC: 30.1 % — LOW (ref 42–52)
HGB BLD-MCNC: 9.7 G/DL — LOW (ref 14–18)
HYALINE CASTS # UR AUTO: ABNORMAL /LPF
INR BLD: 1.11 RATIO — SIGNIFICANT CHANGE UP (ref 0.88–1.16)
KETONES UR-MCNC: NEGATIVE — SIGNIFICANT CHANGE UP
LEUKOCYTE ESTERASE UR-ACNC: NEGATIVE — SIGNIFICANT CHANGE UP
LYMPHOCYTES # BLD AUTO: 1.4 K/UL — SIGNIFICANT CHANGE UP (ref 1–4.8)
LYMPHOCYTES # BLD AUTO: 14.8 % — LOW (ref 20–55)
MCHC RBC-ENTMCNC: 28.4 PG — SIGNIFICANT CHANGE UP (ref 27–31)
MCHC RBC-ENTMCNC: 32.2 G/DL — SIGNIFICANT CHANGE UP (ref 32–36)
MCV RBC AUTO: 88 FL — SIGNIFICANT CHANGE UP (ref 80–94)
MONOCYTES # BLD AUTO: 0.7 K/UL — SIGNIFICANT CHANGE UP (ref 0–0.8)
MONOCYTES NFR BLD AUTO: 7.1 % — SIGNIFICANT CHANGE UP (ref 3–10)
NEUTROPHILS # BLD AUTO: 7.4 K/UL — SIGNIFICANT CHANGE UP (ref 1.8–8)
NEUTROPHILS NFR BLD AUTO: 75.3 % — HIGH (ref 37–73)
NITRITE UR-MCNC: NEGATIVE — SIGNIFICANT CHANGE UP
NT-PROBNP SERPL-SCNC: 1937 PG/ML — HIGH (ref 0–300)
PH UR: 5 — SIGNIFICANT CHANGE UP (ref 5–8)
PLATELET # BLD AUTO: 180 K/UL — SIGNIFICANT CHANGE UP (ref 150–400)
POTASSIUM SERPL-MCNC: 3.9 MMOL/L — SIGNIFICANT CHANGE UP (ref 3.5–5.3)
POTASSIUM SERPL-SCNC: 3.9 MMOL/L — SIGNIFICANT CHANGE UP (ref 3.5–5.3)
PROT SERPL-MCNC: 7.2 G/DL — SIGNIFICANT CHANGE UP (ref 6.6–8.7)
PROT UR-MCNC: 100 MG/DL
PROTHROM AB SERPL-ACNC: 12.2 SEC — SIGNIFICANT CHANGE UP (ref 9.8–12.7)
RBC # BLD: 3.42 M/UL — LOW (ref 4.6–6.2)
RBC # FLD: 15.5 % — SIGNIFICANT CHANGE UP (ref 11–15.6)
SODIUM SERPL-SCNC: 140 MMOL/L — SIGNIFICANT CHANGE UP (ref 135–145)
SP GR SPEC: 1.01 — SIGNIFICANT CHANGE UP (ref 1.01–1.02)
TROPONIN T SERPL-MCNC: 0.01 NG/ML — SIGNIFICANT CHANGE UP (ref 0–0.06)
UROBILINOGEN FLD QL: NEGATIVE MG/DL — SIGNIFICANT CHANGE UP
WBC # BLD: 9.8 K/UL — SIGNIFICANT CHANGE UP (ref 4.8–10.8)
WBC # FLD AUTO: 9.8 K/UL — SIGNIFICANT CHANGE UP (ref 4.8–10.8)
WBC UR QL: SIGNIFICANT CHANGE UP

## 2017-05-19 PROCEDURE — 99285 EMERGENCY DEPT VISIT HI MDM: CPT

## 2017-05-19 PROCEDURE — 99223 1ST HOSP IP/OBS HIGH 75: CPT

## 2017-05-19 PROCEDURE — 93010 ELECTROCARDIOGRAM REPORT: CPT

## 2017-05-19 PROCEDURE — 71010: CPT | Mod: 26

## 2017-05-19 RX ORDER — SODIUM CHLORIDE 9 MG/ML
3 INJECTION INTRAMUSCULAR; INTRAVENOUS; SUBCUTANEOUS ONCE
Qty: 0 | Refills: 0 | Status: COMPLETED | OUTPATIENT
Start: 2017-05-19 | End: 2017-05-19

## 2017-05-19 RX ORDER — DEXTROSE 50 % IN WATER 50 %
25 SYRINGE (ML) INTRAVENOUS ONCE
Qty: 0 | Refills: 0 | Status: DISCONTINUED | OUTPATIENT
Start: 2017-05-19 | End: 2017-05-23

## 2017-05-19 RX ORDER — SODIUM BICARBONATE 1 MEQ/ML
650 SYRINGE (ML) INTRAVENOUS
Qty: 0 | Refills: 0 | Status: DISCONTINUED | OUTPATIENT
Start: 2017-05-19 | End: 2017-05-21

## 2017-05-19 RX ORDER — ACETAMINOPHEN 500 MG
650 TABLET ORAL EVERY 6 HOURS
Qty: 0 | Refills: 0 | Status: DISCONTINUED | OUTPATIENT
Start: 2017-05-19 | End: 2017-05-23

## 2017-05-19 RX ORDER — INSULIN LISPRO 100/ML
VIAL (ML) SUBCUTANEOUS
Qty: 0 | Refills: 0 | Status: DISCONTINUED | OUTPATIENT
Start: 2017-05-19 | End: 2017-05-23

## 2017-05-19 RX ORDER — ERYTHROPOIETIN 10000 [IU]/ML
10000 INJECTION, SOLUTION INTRAVENOUS; SUBCUTANEOUS
Qty: 0 | Refills: 0 | Status: DISCONTINUED | OUTPATIENT
Start: 2017-05-19 | End: 2017-05-23

## 2017-05-19 RX ORDER — POTASSIUM CHLORIDE 20 MEQ
40 PACKET (EA) ORAL DAILY
Qty: 0 | Refills: 0 | Status: DISCONTINUED | OUTPATIENT
Start: 2017-05-19 | End: 2017-05-23

## 2017-05-19 RX ORDER — FUROSEMIDE 40 MG
20 TABLET ORAL ONCE
Qty: 0 | Refills: 0 | Status: COMPLETED | OUTPATIENT
Start: 2017-05-19 | End: 2017-05-19

## 2017-05-19 RX ORDER — SODIUM CHLORIDE 9 MG/ML
1000 INJECTION, SOLUTION INTRAVENOUS
Qty: 0 | Refills: 0 | Status: DISCONTINUED | OUTPATIENT
Start: 2017-05-19 | End: 2017-05-23

## 2017-05-19 RX ORDER — DEXTROSE 50 % IN WATER 50 %
12.5 SYRINGE (ML) INTRAVENOUS ONCE
Qty: 0 | Refills: 0 | Status: DISCONTINUED | OUTPATIENT
Start: 2017-05-19 | End: 2017-05-23

## 2017-05-19 RX ORDER — ATORVASTATIN CALCIUM 80 MG/1
20 TABLET, FILM COATED ORAL AT BEDTIME
Qty: 0 | Refills: 0 | Status: DISCONTINUED | OUTPATIENT
Start: 2017-05-19 | End: 2017-05-23

## 2017-05-19 RX ORDER — GLUCAGON INJECTION, SOLUTION 0.5 MG/.1ML
1 INJECTION, SOLUTION SUBCUTANEOUS ONCE
Qty: 0 | Refills: 0 | Status: DISCONTINUED | OUTPATIENT
Start: 2017-05-19 | End: 2017-05-23

## 2017-05-19 RX ORDER — ASPIRIN/CALCIUM CARB/MAGNESIUM 324 MG
81 TABLET ORAL DAILY
Qty: 0 | Refills: 0 | Status: DISCONTINUED | OUTPATIENT
Start: 2017-05-19 | End: 2017-05-23

## 2017-05-19 RX ORDER — FLECAINIDE ACETATE 50 MG
100 TABLET ORAL EVERY 12 HOURS
Qty: 0 | Refills: 0 | Status: DISCONTINUED | OUTPATIENT
Start: 2017-05-19 | End: 2017-05-23

## 2017-05-19 RX ORDER — FERROUS SULFATE 325(65) MG
325 TABLET ORAL DAILY
Qty: 0 | Refills: 0 | Status: DISCONTINUED | OUTPATIENT
Start: 2017-05-19 | End: 2017-05-19

## 2017-05-19 RX ORDER — FUROSEMIDE 40 MG
10 TABLET ORAL
Qty: 500 | Refills: 0 | Status: DISCONTINUED | OUTPATIENT
Start: 2017-05-19 | End: 2017-05-20

## 2017-05-19 RX ORDER — FERROUS SULFATE 325(65) MG
325 TABLET ORAL
Qty: 0 | Refills: 0 | Status: DISCONTINUED | OUTPATIENT
Start: 2017-05-19 | End: 2017-05-23

## 2017-05-19 RX ORDER — DEXTROSE 50 % IN WATER 50 %
1 SYRINGE (ML) INTRAVENOUS ONCE
Qty: 0 | Refills: 0 | Status: DISCONTINUED | OUTPATIENT
Start: 2017-05-19 | End: 2017-05-23

## 2017-05-19 RX ORDER — NIFEDIPINE 30 MG
60 TABLET, EXTENDED RELEASE 24 HR ORAL DAILY
Qty: 0 | Refills: 0 | Status: DISCONTINUED | OUTPATIENT
Start: 2017-05-19 | End: 2017-05-19

## 2017-05-19 RX ORDER — NITROGLYCERIN 6.5 MG
1 CAPSULE, EXTENDED RELEASE ORAL ONCE
Qty: 0 | Refills: 0 | Status: COMPLETED | OUTPATIENT
Start: 2017-05-19 | End: 2017-05-19

## 2017-05-19 RX ORDER — HYDRALAZINE HCL 50 MG
25 TABLET ORAL EVERY 8 HOURS
Qty: 0 | Refills: 0 | Status: DISCONTINUED | OUTPATIENT
Start: 2017-05-19 | End: 2017-05-21

## 2017-05-19 RX ORDER — ERYTHROPOIETIN 10000 [IU]/ML
2000 INJECTION, SOLUTION INTRAVENOUS; SUBCUTANEOUS
Qty: 0 | Refills: 0 | Status: DISCONTINUED | OUTPATIENT
Start: 2017-05-19 | End: 2017-05-19

## 2017-05-19 RX ADMIN — Medication 20 MILLIGRAM(S): at 17:17

## 2017-05-19 RX ADMIN — SODIUM CHLORIDE 3 MILLILITER(S): 9 INJECTION INTRAMUSCULAR; INTRAVENOUS; SUBCUTANEOUS at 17:17

## 2017-05-19 RX ADMIN — Medication 325 MILLIGRAM(S): at 20:41

## 2017-05-19 RX ADMIN — Medication 5 MG/HR: at 23:07

## 2017-05-19 RX ADMIN — ATORVASTATIN CALCIUM 20 MILLIGRAM(S): 80 TABLET, FILM COATED ORAL at 23:07

## 2017-05-19 RX ADMIN — Medication 100 MILLIGRAM(S): at 23:07

## 2017-05-19 RX ADMIN — Medication 1 INCH(S): at 17:17

## 2017-05-19 RX ADMIN — Medication 25 MILLIGRAM(S): at 20:41

## 2017-05-19 NOTE — ED PROVIDER NOTE - CARDIAC, MLM
Normal rate, regular rhythm.  Heart sounds S1, S2.  No murmurs, rubs or gallops. 1+ pedal edema to upper calf.

## 2017-05-19 NOTE — ED PROVIDER NOTE - OBJECTIVE STATEMENT
increasing pedal edema for 1 week. Pt sent by Dr. Avendano for evaluation and admission. Pt states that his pedal edema worsening for 1 week. Pt with RICHARDS and orthopnea. Denies fever, cough. increasing pedal edema for 1 week. Pt sent by Dr. Avendano for evaluation and admission for CKD. Pt states that his pedal edema worsening for 1 week. Pt without RICHARDS and orthopnea. Denies fever, cough. Pt states that he has had 50 ft RICHARDS for at least 6months.

## 2017-05-19 NOTE — H&P ADULT - ASSESSMENT
83 yr old male with CKD, hypertension, ventricular bigeminy, carotid disease, anemia, diabetes mellitus admitted with leg swelling. On exam, pale and noted to have 3+ pitting edema. Evaluated by nephrology in ED. Labs reviewed.

## 2017-05-19 NOTE — H&P ADULT - HISTORY OF PRESENT ILLNESS
83 yr old male with CKD, hypertension, ventricular bigeminy, carotid disease, anemia, diabetes mellitus admitted with leg swelling. States that he had noted his leg swelling to be worse over the last few weeks and was unable to wear his shoe today. He went to see his nephrologist who advised him to come to the ED. He reports that he walks about 50 feet and feels tired and has to sit down. He has been suffering from right shoulder pain for which he took Ibuprofen, about 9-10 tablets a day, this was a few days ago. He denies chest pain. He was started on iron supplements. Denies abdominal pain. Reports had black stools this am.     PMD: Irvin  Nephro: Romana  Cardio: Gilman  GI: Maya

## 2017-05-19 NOTE — ED ADULT NURSE NOTE - CHPI ED SYMPTOMS NEG
no nausea/no syncope/no shortness of breath/no dizziness/no fever/no diaphoresis/no back pain/no chills/no vomiting

## 2017-05-19 NOTE — ED ADULT NURSE NOTE - OBJECTIVE STATEMENT
82y/o male c/o lower extremity swelling. Pt AOx3, resp even unlabored, 84y/o male c/o lower extremity swelling. Pt AOx3, resp even unlabored. +pedal pulses. Denies chest pain, SOB, chest pain, numbness or tingling. will continue to monitor

## 2017-05-20 DIAGNOSIS — R60.0 LOCALIZED EDEMA: ICD-10-CM

## 2017-05-20 DIAGNOSIS — E11.52 TYPE 2 DIABETES MELLITUS WITH DIABETIC PERIPHERAL ANGIOPATHY WITH GANGRENE: ICD-10-CM

## 2017-05-20 DIAGNOSIS — M25.511 PAIN IN RIGHT SHOULDER: ICD-10-CM

## 2017-05-20 DIAGNOSIS — N18.4 CHRONIC KIDNEY DISEASE, STAGE 4 (SEVERE): ICD-10-CM

## 2017-05-20 DIAGNOSIS — I50.30 UNSPECIFIED DIASTOLIC (CONGESTIVE) HEART FAILURE: ICD-10-CM

## 2017-05-20 LAB
ANION GAP SERPL CALC-SCNC: 19 MMOL/L — HIGH (ref 5–17)
ANISOCYTOSIS BLD QL: SLIGHT — SIGNIFICANT CHANGE UP
BASOPHILS NFR BLD AUTO: 2 % — SIGNIFICANT CHANGE UP (ref 0–2)
BUN SERPL-MCNC: 75 MG/DL — HIGH (ref 8–20)
CALCIUM SERPL-MCNC: 9.2 MG/DL — SIGNIFICANT CHANGE UP (ref 8.6–10.2)
CHLORIDE SERPL-SCNC: 106 MMOL/L — SIGNIFICANT CHANGE UP (ref 98–107)
CO2 SERPL-SCNC: 20 MMOL/L — LOW (ref 22–29)
CREAT SERPL-MCNC: 3.1 MG/DL — HIGH (ref 0.5–1.3)
EOSINOPHIL NFR BLD AUTO: 2 % — SIGNIFICANT CHANGE UP (ref 0–6)
FERRITIN SERPL-MCNC: 54.3 NG/ML — SIGNIFICANT CHANGE UP (ref 30–400)
GLUCOSE SERPL-MCNC: 111 MG/DL — SIGNIFICANT CHANGE UP (ref 70–115)
HBA1C BLD-MCNC: 6 % — HIGH (ref 4–5.6)
HCT VFR BLD CALC: 30.5 % — LOW (ref 42–52)
HGB BLD-MCNC: 10.1 G/DL — LOW (ref 14–18)
INR BLD: 1.12 RATIO — SIGNIFICANT CHANGE UP (ref 0.88–1.16)
IRON SATN MFR SERPL: 93 UG/DL — SIGNIFICANT CHANGE UP (ref 59–158)
LYMPHOCYTES # BLD AUTO: 13 % — LOW (ref 20–55)
MACROCYTES BLD QL: SLIGHT — SIGNIFICANT CHANGE UP
MAGNESIUM SERPL-MCNC: 2.2 MG/DL — SIGNIFICANT CHANGE UP (ref 1.6–2.6)
MCHC RBC-ENTMCNC: 29 PG — SIGNIFICANT CHANGE UP (ref 27–31)
MCHC RBC-ENTMCNC: 33.1 G/DL — SIGNIFICANT CHANGE UP (ref 32–36)
MCV RBC AUTO: 87.6 FL — SIGNIFICANT CHANGE UP (ref 80–94)
MICROCYTES BLD QL: SLIGHT — SIGNIFICANT CHANGE UP
MONOCYTES NFR BLD AUTO: 8 % — SIGNIFICANT CHANGE UP (ref 3–10)
NEUTROPHILS NFR BLD AUTO: 74 % — HIGH (ref 37–73)
NEUTS BAND # BLD: 1 % — SIGNIFICANT CHANGE UP (ref 0–8)
PHOSPHATE SERPL-MCNC: 5.3 MG/DL — HIGH (ref 2.4–4.7)
PLAT MORPH BLD: NORMAL — SIGNIFICANT CHANGE UP
PLATELET # BLD AUTO: 180 K/UL — SIGNIFICANT CHANGE UP (ref 150–400)
POTASSIUM SERPL-MCNC: 3.8 MMOL/L — SIGNIFICANT CHANGE UP (ref 3.5–5.3)
POTASSIUM SERPL-MCNC: 4.3 MMOL/L — SIGNIFICANT CHANGE UP (ref 3.5–5.3)
POTASSIUM SERPL-SCNC: 3.8 MMOL/L — SIGNIFICANT CHANGE UP (ref 3.5–5.3)
POTASSIUM SERPL-SCNC: 4.3 MMOL/L — SIGNIFICANT CHANGE UP (ref 3.5–5.3)
PROTHROM AB SERPL-ACNC: 12.4 SEC — SIGNIFICANT CHANGE UP (ref 9.8–12.7)
RBC # BLD: 3.48 M/UL — LOW (ref 4.6–6.2)
RBC # FLD: 15.3 % — SIGNIFICANT CHANGE UP (ref 11–15.6)
RBC BLD AUTO: ABNORMAL
SODIUM SERPL-SCNC: 145 MMOL/L — SIGNIFICANT CHANGE UP (ref 135–145)
WBC # BLD: 8.8 K/UL — SIGNIFICANT CHANGE UP (ref 4.8–10.8)
WBC # FLD AUTO: 8.8 K/UL — SIGNIFICANT CHANGE UP (ref 4.8–10.8)

## 2017-05-20 PROCEDURE — 20610 DRAIN/INJ JOINT/BURSA W/O US: CPT | Mod: RT

## 2017-05-20 PROCEDURE — 99233 SBSQ HOSP IP/OBS HIGH 50: CPT

## 2017-05-20 PROCEDURE — G0365: CPT | Mod: 26

## 2017-05-20 PROCEDURE — 99222 1ST HOSP IP/OBS MODERATE 55: CPT | Mod: 25

## 2017-05-20 PROCEDURE — 73030 X-RAY EXAM OF SHOULDER: CPT | Mod: 26,RT

## 2017-05-20 RX ORDER — FUROSEMIDE 40 MG
40 TABLET ORAL DAILY
Qty: 0 | Refills: 0 | Status: DISCONTINUED | OUTPATIENT
Start: 2017-05-20 | End: 2017-05-21

## 2017-05-20 RX ORDER — IRON SUCROSE 20 MG/ML
100 INJECTION, SOLUTION INTRAVENOUS DAILY
Qty: 0 | Refills: 0 | Status: DISCONTINUED | OUTPATIENT
Start: 2017-05-21 | End: 2017-05-23

## 2017-05-20 RX ADMIN — Medication 25 MILLIGRAM(S): at 15:11

## 2017-05-20 RX ADMIN — Medication 25 MILLIGRAM(S): at 21:39

## 2017-05-20 RX ADMIN — Medication 40 MILLIEQUIVALENT(S): at 12:31

## 2017-05-20 RX ADMIN — Medication 1 TABLET(S): at 12:30

## 2017-05-20 RX ADMIN — Medication 650 MILLIGRAM(S): at 21:46

## 2017-05-20 RX ADMIN — Medication 650 MILLIGRAM(S): at 06:06

## 2017-05-20 RX ADMIN — Medication 81 MILLIGRAM(S): at 12:30

## 2017-05-20 RX ADMIN — ATORVASTATIN CALCIUM 20 MILLIGRAM(S): 80 TABLET, FILM COATED ORAL at 21:39

## 2017-05-20 RX ADMIN — Medication 650 MILLIGRAM(S): at 12:30

## 2017-05-20 RX ADMIN — Medication 25 MILLIGRAM(S): at 05:22

## 2017-05-20 RX ADMIN — Medication 650 MILLIGRAM(S): at 00:02

## 2017-05-20 RX ADMIN — Medication 1 INCH(S): at 05:24

## 2017-05-20 RX ADMIN — Medication 100 MILLIGRAM(S): at 12:30

## 2017-05-20 RX ADMIN — Medication 650 MILLIGRAM(S): at 05:22

## 2017-05-20 RX ADMIN — Medication 325 MILLIGRAM(S): at 12:31

## 2017-05-20 RX ADMIN — Medication 650 MILLIGRAM(S): at 05:23

## 2017-05-20 RX ADMIN — Medication 40 MILLIGRAM(S): at 21:42

## 2017-05-20 RX ADMIN — Medication 325 MILLIGRAM(S): at 08:51

## 2017-05-20 NOTE — CONSULT NOTE ADULT - SUBJECTIVE AND OBJECTIVE BOX
Reason For Visit  FefzgdAylUoiza7q574406-l787-20ps-lw35-749g314d07f0TjyyKcoox LwealNchrere9Hlirg RvzqcNilrqqj5Akl WtnbgVqwnlrr3Zzvgv CHRISTIANO ELIZABETH is a 83 year old male being seen for a follow-up visit, DMN, HTN, CKD - 4 A 3 , Cardiac arrhythmias & Carotid atherosclerosis ; Off Plavix & On NaHC03,. RqdklFdhxnxv2Wec OxvkpVwaeokd1Ezacm   Patient accompanied by family member. QreusXbzmmfe9Jyx VlyqaYutauaq7Dmiyx KxeseJjbapvz7Ueu MlgcdWdzejqi9Rvanx EmrbsBbbkqmm6Tda KhzfjfDwvCbpde8h494444-v923-77tp-gc99-912o748g63z1OxudNep        ReasonForVisitSectionEnd  HistoryofPresentIllnessSectionStart History of Present Illness  XkzeyiwtamPQWs59th13x-767d-45x7-71l7-08b3y476k459DeknTxgpk DbmmqHyclwla5Yavlk FukooAqepemc8Xrl IizfuDwqqrjz5Oimel   Chronic Kidney Disease: The last visit was 3 month(s) ago. Management changes made at the last visit include adding Dialyvite, stopping ACEI and ordering RP.   Interval Events: He has no significant interval events.   The patient has chronic kidney disease stage 4 and the etiology is diabetic nephropathy and hypertensive nephropathy. The patient is experiencing the following symptoms: nocturia and edema. Current Treatment includes renal diet, diabetes management, vitamin D and renal vitamins. By report, patient demonstrates good compliance, good tolerance and good symptom control with treatment. HdljmDtibjom0Hcl WkdrcKsykrjw1Mlpks WpfajBhwxoyq7Uja NjuqdJrmdjna6Rkojr BjrwjVbchier4Bmf PvyqiImhezmp4Xmzke QnesnVqjxbch6Cfd GrjqkqdzhzOJBw48mf74b-091s-84n8-95i0-31a2k985v177XyidHid   IlgqioxiwDLHer503864-n02l-9ilz-cwno-867468d0r918MwglXmvqz   QcveOcicWxevc5Zifxd DM ~ 25 years, HX., of Cardiac arrhythmias ( V.Bigeminy )    HTN X ~ 10 years ;    R- Carotid atherosclerosis , with + Bruit ;    No Known HX ., of CKD :    F.U [facility],    Compliant w. Diet , DM * HTN Better controlled, OxlwUdvtJwpsj9Umn AilizdaweOZLkt393879-x07w-7boh-kuqn-893426k0z248AeohHva        HistoryofPresentIllnessSectionEnd  ActiveProblemsSectionStart Active Problems  ActiveProblems_20_twCiteListControlStart Aortic stenosis (424.1) (I35.0)  Asymptomatic carotid artery stenosis without infarction (433.10) (I65.29)  CHF (congestive heart failure) (428.0) (I50.9)  CKD (chronic kidney disease), stage III (585.3) (N18.3)  CKD (chronic kidney disease), stage IV (585.4) (N18.4)  Diabetic nephropathy associated with type 2 diabetes mellitus (250.40,583.81) (E11.21)  HTN (hypertension), benign (401.1) (I10)  Knee pain (719.46) (M25.569)  Left knee pain (719.46) (M25.562)  Localized primary osteoarthritis of left lower leg (715.16) (M17.12)  Metabolic acidosis (276.2) (E87.2)  ActiveProblems_20_twCiteListControlEnd     ActiveProblemsSectionEnd  PastMedicalHistorySectionStart Past Medical History  PastMedicalHistory_20_twCiteListControlStart History of cardiac disorder (V12.50) (Z86.79)  History of diabetes mellitus (V12.29) (Z86.39)  History of No pertinent past surgical history  PastMedicalHistory_20_twCiteListControlEnd     PastMedicalHistorySectionEnd  SurgicalHistorySectionStart Surgical History  SurgicalHistory_20_twCiteListControlStart History of Arthroscopy Knee Left  SurgicalHistory_20_twCiteListControlEnd     SurgicalHistorySectionEnd  FamilyHistorySectionStart Family History  FamilyHistory_20_twCiteListControlStart Family history of malignant neoplasm (V16.9) (Z80.9) : Father, Sister, Brother  FamilyHistory_20_twCiteListControlEnd     FamilyHistorySectionEnd  SocialHistorySectionStart Social History  SocialHistory_20_twCiteListControlStart   Never a smoker  No alcohol use  No drug use  Non-smoker (V49.89) (Z78.9)  Occasional alcohol use  SocialHistory_20_twCiteListControlEnd     SocialHistorySectionEnd  CurrentMedsSectionStart Current Meds  CurrentMeds_4_twCiteListControlStart Aspirin 81 MG TABS; TAKE 1 TABLET DAILY  Atenolol 25 MG Oral Tablet; TAKE 0.5 TABLET Daily  Ferrous Sulfate 325 (65 Fe) MG Oral Tablet; TAKE 1 TABLET BY MOUTH THREE TIMES  DAILY BEFORE A MEAL  Flecainide Acetate 100 MG Oral Tablet; TAKE 1 TABLET EVERY 12 HOURS  GlipiZIDE 5 MG Oral Tablet; TAKE 0.5 TABLET Daily  NIFEdipine ER 60 MG Oral Tablet Extended Release 24 Hour; TAKE 1 TABLET DAILY  Renal Multivitamin/Zinc Oral Tablet; TAKE 1 TABLET DAILY  Sodium Bicarbonate 650 MG Oral Tablet; TAKE 1 TABLET 3 TIMES DAILY WITH MEALS  Torsemide 10 MG Oral Tablet; TAKE 2 TABLET Daily  CurrentMeds_4_twCiteListControlEnd     CurrentMedsSectionEnd  AllergiesSectionStart Allergies  Allergies_20_twCiteListControlStart No Known Drug Allergies  Allergies_20_twCiteListControlEnd     AllergiesSectionEnd  HistoryReviewedSectionStart History Reviewed  IyemedbAutyckjs77to97k6-7b2m-2833-o727-f3b08366lh78KkjjViacp JvvmEodiiaj1Hmuiu History reviewed. HsvmIucsboq9Vxu WjjfByztzkg8Icphh Medications and allergies reviewed. ZjrsDysylvd4Swc OyjblzfSvedcvsu06it56o2-1v9l-3636-q935-z9t13722xk98KkacSsl        HistoryReviewedSectionEnd  ReviewofSystemsSectionStart Review of Systems  Complete-Glzob729337v-m9o5-9424-nrz8-20m0og7higquSgylWynly WaikdYtpxxwj6Kxslr TeixrCrcfbek8Amn XptemLohyspc6Iszxn QbyqdKqkuuez4Mvl OjwqhRdxuwua8Wvkif DugdwZqlonjp8Bfo GutneUhfyshv7Ucbif MxxqvYcrbbqq7Vna DpehoIvdkwdd80Ddjan UppmxEpaktyf15Svh TzapbXakmghp74Afhyh SyxioXakqbjx85Aav WodipGdiujwn89Prypy XmfecHrtixor57Lya VjdtaNlotdxi98Bpicx PgoehXnwmrgg99Hvs SyfogCfntcar20Tqvoz ThlnwQhkwjmq72Nzq RokklHzipjvy4Jawbg GxqlkTgmgwwz3Wrn LsrrsJuuanwc1Icsif YwrzyZxeccyq8Zrl LvshhXfeirsp2Wqapp TviqrNnfmlim5Ykn PhisuLuokrrj8Xzsry QhlrtSfdejjr7Txk VfjniAvodsxh66Vdteo PararXezujfl39Kxn LpagyMbffyoe40Yrpbx   Constitutional, Eyes, ENT, Cardiovascular, Respiratory, Gastrointestinal, Genitourinary, Musculoskeletal, Integumentary, Neurological, Psychiatric, Endocrine and Heme/Lymph are otherwise negative. YefpgZeshohk02Dqh Uaewcelq-Hlxor884660j-z7t0Aunrl761480z-y7t4-4439-fhg8-78u2qe6orsolNviqFvw        ReviewofSystemsSectionEnd  Results/DataSectionStart Results/Data  ResultsData_31_twCiteListControlStart ResultsData_31_twCiteListControlEnd ResultsData_-2_twCiteListControlStart Results   23Mar2017 11:40AM   Complete Blood Count w DIFF   RBC Count: 3.43 M/uL Abnormal Low Reference Range 4.20-5.80  PLT: 243 K/uL Reference Range 150-400  Basophil #: 0.10 K/uL Reference Range 0.00-0.20  Mean Cell Hemoglobin Conc: 32.0 gm/dL Reference Range 32.0-36.0  Eosinophil %: 2.1 % Reference Range 0.0-6.0  Monocyte #: 0.73 K/uL Reference Range 0.00-0.90  Mean Cell Volume: 89.2 fl Reference Range 80.0-100.0  Lymphocyte %: 16.2 % Reference Range 13.0-44.0  Neutrophil #: 8.06 K/uL Abnormal High Reference Range 1.80-7.40  HGB: 9.8 g/dL Abnormal Low Reference Range 13.0-17.0  Auto Immature Granulocyte %: 0.2 % Reference Range 0.0-1.5  WBC: 10.91 K/uL Abnormal High Reference Range 3.80-10.50  Red Cell Distrib Width: 15.7 % Abnormal High Reference Range 10.3-14.5  Basophil %: 0.9 % Reference Range 0.0-2.0  Eosinophil #: 0.23 K/uL Reference Range 0.00-0.50  Mean Cell Hemoglobin: 28.6 pg Reference Range 27.0-34.0  Monocyte %: 6.7 % Reference Range 2.0-14.0  HCT: 30.6 % Abnormal Low Reference Range 39.0-50.0  Lymphocyte #: 1.77 K/uL Reference Range 1.00-3.30  Neutrophil %: 73.9 % Reference Range 43.0-77.0  MAN DIFF?: N/A  Reference Range No  76Vzw0967 12:04PM   Basophil #: 0.08 K/uL Reference Range 0.00-0.20  Mean Cell Hemoglobin Conc: 31.1 gm/dL Abnormal Low Reference Range 32.0-36.0  Monocyte #: 0.54 K/uL Reference Range 0.00-0.90  Mean Cell Volume: 91.3 fl Reference Range 80.0-100.0  Basophil %: 0.8 % Reference Range 0.0-2.0  Lymphocyte %: 15.1 % Reference Range 13.0-44.0  Neutrophil #: 7.65 K/uL Abnormal High Reference Range 1.80-7.40  HGB: 10.8 g/dL Abnormal Low Reference Range 13.0-17.0  Auto Immature Granulocyte %: 0.3 % Reference Range 0.0-1.5  RBC Count: 3.80 M/uL Abnormal Low Reference Range 4.20-5.80  Red Cell Distrib Width: 16.4 % Abnormal High Reference Range 10.3-14.5  Eosinophil #: 0.37 K/uL Reference Range 0.00-0.50  WBC: 10.21 K/uL Reference Range 3.80-10.50  Mean Cell Hemoglobin: 28.4 pg Reference Range 27.0-34.0  Monocyte %: 5.3 % Reference Range 2.0-14.0  HCT: 34.7 % Abnormal Low Reference Range 39.0-50.0  Lymphocyte #: 1.54 K/uL Reference Range 1.00-3.30  Eosinophil %: 3.6 % Reference Range 0.0-6.0  Neutrophil %: 74.9 % Reference Range 43.0-77.0  PLT: 254 K/uL Reference Range 150-400  MAN DIFF?: N/A  Reference Range No  Renal Panel   Chloride: 111 mmol/L Abnormal High Reference Range   Sodium: 144 mmol/L Reference Range 135-145  Potassium: 4.3 mmol/L Reference Range 3.5-5.3  Phosphorous: 4.8 mg/dL Abnormal High Reference Range 2.5-4.5  BUN: 59 mg/dL Abnormal High Reference Range 7-23  CO2: 17 mmol/L Abnormal Low Reference Range 22-31  Anion Gap, Serum: 16 mmol/L Reference Range 5-17  Creatinine: 2.78 mg/dL Abnormal High Reference Range 0.50-1.30  Glucose: 78 mg/dL Reference Range 70-99  Albumin: 4.2 g/dL Reference Range 3.3-5.0  Calcium, Serum: 9.4 mg/dL Reference Range 8.4-10.5  eGFR Non -American: 20 mL/min/1.73M2 Abnormal Low Reference Range >=60  eGFR -American: 23 mL/min/1.73M2 Abnormal Low Reference Range >=60  ResultsData_-2_twCiteListControlEnd     Results/DataSectionEnd   PhysicalExamSectionStart Physical Exam  GeneralAppearance(Brief)MVGHOx61hs216-8v19-5i3s-3999-30i1quv673ugLeiuIxbah VStart Constitutional: alert, well nourished, well developed and normal voice and communication . pale, Sallow complexion, Dyspneic on Minimal Exertion,. VEnd EmqdgGvklhxz2Ynwdn OqukkKemacuv8Sjx RuvsfRzduljl8Fvkth ZuhyfKbxskxb8Xag HaolfZkdgufk6Hooag DbtwzXxruzpk0Qrh GevezRfwovox0Czlbl UurneMkirwpt0Mdo VduxsYraadsg2Lylky ZhmprHffhkav3Rfg BqxmuUeesrho1Rcwrn GffyhThrwrcy3Fsu GeneralAppearance(Brief)YZVSWm81fd836-3k78-5g9s-4426-90n7qpq250vbXfjxWlt   Eye(Brief)TWNWEq3no4p81-cx04-031k-sg11-e9l2g7906m68HthsUjncr   VStart Eyes: the sclera and conjunctiva were normal, pupils were equal in size, round, and reactive to light, extraocular movements were intact and no strabismus was seen. VEnd BenyhWbtezsx7Devvg PqhrmEydspaf1Bdh UxnwfPfkvkix8Moraj EzjakTdtozsx2Ivr YhrhpLoiduna7Sdzhk NdappMqfkrvn4Sot TkoeiBppymvm3Askpy TbbktJfokqix3Mau AryinHnhxkbt7Isnkq NuqlsCuwgfgy1Sqq MqxmeEkxuvfc2Qssta   Right fundus: narrowing of the arterioles noted.   Left fundus: narrowing of the arterioles noted. UamagJcwtadx8Faq GdkmyHgpayho7Lizcx BdgmpBiqhxoi5Nen Eye(Brief)VITYEt2nu5b73-se28-421f-cr04-e3g5f5936k27YaqlXgd   ENT(Brief)WJPJLVylbbh1u0s85-w2z6-5itg-uofe-48753gai8915XxzvGwjkl   OmzusOmswgyb7Opaea ENT: the ears and nose were normal in appearance, hearing was normal, the lips and gums were normal, both tympanic membranes were normal and the nasal mucosa and septum were normal . the oropharynx was normal. QcnfmFhuhhpa8Lmx QgwglXsxqetp9Feezi KsuwrPukmvfg2Lam JrremNmzlzmj2Spzyn QwywcTrgrzet2Djj VkyuvZtzrwaq6Mreot BbssqMkuirha7Vho HafwwOspihnf7Bcvil HixxaSjffapr7Mrg LrzudJkjbrpn3Hotyx UqefqDqvdijb0Jzd MjrkaGwkrjmv4Tusho UxvhcTtwcgru2Dbd EtjouRxgfuxa6Kugjh GikxgZsgqnbw8Iuy WabiRfwsTeagu4Eqooj VbhcUcidQmmyg7Zsc ENT(Brief)HMSYCPoyvte1n0e45-n9k7-8nwo-sqng-11538xzq4219MwfzXuz   Neck(Brief)QCNNG32s0y924-4357-49cw-ynao-km487372o581UsupZbylw   KoximZkkhhcx50Fwvwq Neck: the appearance of the neck was normal, no neck mass was observed, the thyroid was not enlarged and there were no palpable thyroid nodules . there was no jugular-venous distention. neck circumference is < 17. AfvpmCfmomml10Ttk RjznmCoawnjm00Iyetr SwryqAmxjfpd05Gyi UsbjoXbhwwah88Drlsz CozzcYveghhd88Cxt KeenzMfdhpdy88Rmjje QhahoIkhqkjm03Kuc CqkuzNueugow48Dgrfg KmzfxXxlsydj30Eys YtsucJlrexmi03Xowas NlxaxRkebijn50Gdw NdlonDjruors25Mkmwb YaofwOdccmjg19Raw Neck(Brief)JOQPG48w5l187-9445-26wg-kkww-ti327662t013ZjuoQng   Pulmonary(Brief)JEXUK78j07u6u-7n5g-2o27-uda0-k06pykp870txHwaoTrmvv   NqdtqJowsbya47Ubmjt Pulmonary: no respiratory distress, normal respiratory rhythm and effort, no accessory muscle use and palpation of the chest revealed no abnormalities . the lungs were normal to percussion. MlvejBpkdnri86Rsn AzdklAjdfted09Ogtli WhgzcTrsqnkx91Oxp WpcqvUkccyhn32Jspdo AdnwkUpdnftk09Iad GgxmqAxctihy68Lilmd BcgymYayulau68Lth CtoduZgmrzbl35Jymaj scattered wheezing, decreased breath sounds and bibasilar rales heard on auscultation. SyqmgBspqtbz40Fsd KvxqiHlmyuvw97Seygt ObujcWntxwbz97Bhj NccuuMeztiyf93Akunf EqfhoUropiee40Mrn Pulmonary(Brief)MSZAT62s45d1p-2g9q-9j40-xou1-x07fkjt878cuHurcCou   Cardiac(Brief)SLQTM45l62u00-l5c5-7y43-7w45-9er9avmz99yiDqmtGwfpn   VStart Heart: the apical impulse was normal, heart rate was normal and rhythm regular, normal S1 and S2, no gallops, no murmurs and no pericardial rub . Recent Holter Monitor - stable Rhythm - per cardiology :. VEnd OrobsEevdzpk2Pjmij WzwhwBqhwkmp8Xjb MfvgjJkiwlrw9Htgnj ShlbwAgrjcag1Wab KtzpmBmklwnf2Ocmhd FhaalUodrsly7Mgs FnsywVzzfvzp5Inaxn UdpzlPnmopeg2Eeg OwzuwIqwnqbu7Ypscy A grade 2/6 systolic murmur was heard. VjdsjTtpfnnz5Akd RakcpEhdjzqp1Hghvv LrbupLqpiujb0Vjf Cardiac(Brief)TJFFR93h67h17-v7n9-0x57-9w27-1jx1zwiv28faDmalSqd   Vascular(Brief)DKGDC0yf74m55-z016-5266-58ss-jdx86pfw4t1hJxxePiheg   VStart Vascular: carotid pulses were normal with no bruits, the pedal pulses are present and there were no varicosital changes . R- Carotid Bruit ;. VEnd QtbwbUagqawh6Xqqmh MpyytApmbxyd4Zhs XhuhdPbocdqu0Ocesy RhyisSzfgjjd3Szr UeabqKnbsgny3Wyzpf AfdngHmkdeuz8Whw FypkvLgyamnn9Lckwb KspokOsxnkne7Bqy ZnhvmOklvuud6Ygpiv Edema: pitting edema present, bilateral 4+ pitting edema to the ankles. CckheQdrzmye0Qzu QzzpeAbrpuxm3Vtejb VsfsfQmlipkt4Mfm Vascular(Brief)GAULH2mp00n63-t643-3297-19bj-umm30eag2z7cEryrUes   Abdomen(Brief)MRFAE6o18tij0-rml6-44xj-898h-5gel9j6869n1HqbfPcebf   VStart Abdomen: normal bowel sounds, soft, non-tender, no hepato-splenomegaly, no abdominal mass palpated and no hernia was discovered . Distended , ? Mild Ascites. VEnd ZpyybFhgdcpu1Oktle UgmkkYhjykdx2Jln YikqyMuqeipl1Plbud FoehnDtlbfkd3Eqk OpfyzZfwnuvw6Fzftd NxmxaIdydwjr7Zbr OyjvqVzudtmq7Scwty SfgcpHyodngu7Njq OxzwsRqmycpa7Gpogz YylvtDaolnxv4Gzn NmmzwJcxrptt8Ufein QgngdEafisjf9Blu Abdomen(Brief)MBJNQ4h63jim1-xcc8-29ta-158g-5guz2j3263s3JwsiOan   Lymphatics(Brief)GITVHh26h822g-1e88-8805-b666-77g388kf9f7uYssjXbcpa   MivxkUswbvsk36Qkguv Lymphatics: AfytaDzbuxsq64Tby FxlqcDgzohzv93Ynwkq The posterior cervical, anterior cervical, supraclavicular, axillary, femoral and inguinal nodes were non-tender and normal size. FyxaxDrkfydy66Kws KblapXaewuye72Luylp HdnndUsuyeqx03Hcd SjvdeGqeutio34Sijlq TqduvUuoukpo06Qpv JkybgBdvugfb57Yipra GmtesEypcfms52Hdl FiafpUyjpmiz55Pvsch FzjawBikezoo72Auq GqjboAovrdyt55Pxjdz OixrdNgyjxfc62Fse HmrbwMbwypvt95Uhznz VxdanPeqntrl63Ezq Lymphatics(Brief)OXLZOk56m151k-3w42-1862-p815-26j033kh7b3wWbyjCqm   Back(Brief)0b474k6g-6k9f-3b59-7898-e503hk37k109ZyieNkbtm   JqnjeGrwptrb7Xhaop Back: no costovertebral angle tenderness and no spinal tenderness. QncdxEotvvda2Xym Back(Brief)7p261c1p-1a9n-2y06-2957-z774aw01d685FbtsHio   MusculoskeletalMale(Brief)TXSQX7n9f9clz-88jr-4u00-fn65-25e16094n4i6QxasBvnnl   MdytaScqlivi5Eafub Musculoskeletal: normal gait, no clubbing or cyanosis of the fingernails, no involuntary movements were seen and muscle strength and tone were normal . ROM - R Shoulder limited,. NrzvqWuwzbqs2Gla ShgfiJwqonlu4Nhtia XrrrtVzntata4Wvp WveoyDwyjgtg5Ommpf DkhwiAayyltf6Giw TpianUamiopg3Xrhar AlwxaPfipshm6Pyp KuaagSfzvxph1Oxrod HhjhrBwqlqyp0Duq HcrcuNnqnulg7Vnupp HshwcLmgsygl4Ldi MusculoskeletalMale(Brief)MSRQS5d8k7cfm-48vv-0t23-vq93-69j98722x5v1IjtvDbb   Skin(Brief)MHJVGl0y025z9-3lp0-30ge-1w9g-766wc3j345y3ByvrVvrnf   JcgffMfrlall2Oeais Skin: normal skin color and pigmentation, normal skin turgor, no rash and no skin lesions. WpyohIkcaqtk3Ykr WedipYlghxox7Istwi GpgwoSchixev7Dbt MvakwFydywqi6Idqii AiiqpYrmarkd4Zgh PsmkvEstvunm0Stxnz AnzabBimluin2Kwq RigoeUjiiyjx4Lples CjecbQhkgdja7Rei WjhleSfdamso1Fcvvl   Diabetic Foot Exam: right foot was examined, left foot was examined The right toes were normal and with full range of motion. The left foot was normal in appearance. The left toes were normal and with full range of motion. HfhfjYkxrytp1Cee Skin(Brief)SNGIEe8f710x1-9aj5-14en-3w2v-017dl3h908q6SnihLtq   Neuro(Brief)FPWGX57181180-74nw-73a6-d3b4-3j2x1925h0ghMvqvQqtgg   EcvmoZyjesxn8Ycurf Neurological: cranial nerves 2-12 were intact, deep tendon reflexes were 2+ and symmetric, the sensory exam was normal to light touch and pinprick, the motor exam was normal and no focal deficits. IhxslAtevutx1Nhr LypctMfgiuui9Rjzrd XbaxoWrlpmxj6Vir LhzmjWdkzvzf6Vyqfg AmhzgWulgxys7Och TxplsYpqdmbz8Sxaff RyacjIkwgebd5Gqy NrfitAjtsrbw6Zkcoe SusqwGbofujo3Zwy Neuro(Brief)SNLPI34145665-34dd-15a1-v7k8-2r9h6141l5xpQhrvXly   Psychiatric(Brief)GQFIXjv5l0c61-1ty9-5982-6a3e-vo2vc903uf6nKvhbZogtp   MvrrmWqxgsei1Sfkis Psychiatric: oriented to person, place, and time, insight and judgment were intact, the affect was normal, the mood was normal, recent memory was not impaired and remote memory was not impaired. ItzazSylkrff0Dtm IardmVevhfcm5Iahrx MujruGmalmok5Iog OaxukHkfpolo1Oxwdt UkhaxHsvtplm5Yqw NsympFtxdukv0Qigzw OxgsyZkuyder6Deq LzdgsLwgovoj0Icwrs ShmkvDbohqfl7Ocz YjfsmIwyosal6Gncdl BsyivJhgtvjc8Ftv MrpatFzlnaxq1Zgzds RwnmjGcytjkx3Ypd QqodyJsdlogt03Qwnei BdmkpTxyrlut47Bte Psychiatric(Brief)XRIMMrl0z5t66-4ss0-4569-3a3r-zw2cq135vz7jXwvdXog        PhysicalExamSectionEnd  AssessmentSectionStart Assessment  Assessment_80_twCiteListControlStart CKD (chronic kidney disease), stage IV (585.4) (N18.4)  Aortic stenosis (424.1) (I35.0)  Asymptomatic carotid artery stenosis without infarction (433.10) (I65.29)  CHF (congestive heart failure) (428.0) (I50.9)  CKD (chronic kidney disease), stage III (585.3) (N18.3)  Diabetic nephropathy associated with type 2 diabetes mellitus (250.40,583.81) (E11.21)  HTN (hypertension), benign (401.1) (I10)  Assessment_80_twCiteListControlEnd Asnjjzibk9t682588-e632-7pt7-4999-691pj787uff1DqyoVjicu PrnbkTrntcli0Zlylh   79 y/o M - with asymptomatic moderate carotid stenosis, right > Left ( Stable, w.o progression ) DM - 2 , HTN.     Proteinuric ( DMN ) w. CKD - 4 : Has been taking NSAIDs for R - Shoulder pain X last week,    Unchanged over the past year. C/O RICHARDS , Leg edema 4+    Off ACEI : DM control improved.    US c.w CKD     Plan :     Continue medical management ( DM , HTN control , Avoid NSAIDs )    F/U Carotid duplex Q 6 months - Vascular surgery.  Complaints of increasing leg edema and weight gain, w. dyspnea on exertion, + heart murmur of aortic stenosis , w. edema left more so than right 2+ :      Recommend:  #1 Off amlodipine.  #2 On torsemide 20 mg daily ( Patient was on 10 mg., daily ! )    Followup with the cardiologist ( Dr. Peterson )    On Low K+ Diet,    AVF, Epogen ( Hgb < 10 gm.,)     Hospitalization for ADHF & plan AVF, [facility] Transition RN involved,    Discussed w. the family @ [facility],        .. AhvgoGfcakhx9Hph UujocNobvlsg5Uzmuq   CihoyJubcrdt5Kkc Rtafjnylc8m694784-m505-8xl2-1248-886dg556bem5BmegQnm        AssessmentSectionEnd  PlanSectionStart Plan  Plan_2_twCiteListControlStart Aortic stenosis, Asymptomatic carotid artery stenosis without infarction, CHF (congestive  heart failure), CKD (chronic kidney disease), stage III, CKD (chronic kidney  disease), stage IV, Diabetic nephropathy associated with type 2 diabetes mellitus   Complete Blood Count w DIFF; Status:Active; Requested for:[id number];   Renal Panel; Status:Active; Requested for:[id number];   Asymptomatic carotid artery stenosis without infarction, CHF (congestive heart failure),  CKD (chronic kidney disease), stage IV, Metabolic acidosis   Renew: Torsemide 100 MG Oral Tablet; TAKE 1 TABLET DAILY AS DIRECTED
Pt Name: CHRISTIANO ELIZABETH    MRN: [id number]    82 yo male admitted to [facility] for CHF and renal failure c/o right shoulder pain.  Pt states no recent injury, no numbness, no tingling.  Pain is worse with rom over 90 degrees.  Pain radiates to the elbow.  Denies pain to the hand/fingers.  Pt was seen many years ago by an orthopedist and received a cortisone injection which helped with his pain.    .    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, unspecified long term insulin use status  Stage 4 chronic kidney disease: Stage 4 chronic kidney disease  Acute pain of right shoulder: Acute pain of right shoulder  Pedal edema: Pedal edema  DM (diabetes mellitus): DM (diabetes mellitus)  HTN (hypertension): HTN (hypertension)  VT (ventricular tachycardia): VT (ventricular tachycardia)  RICHARDS (dyspnea on exertion): RICHARDS (dyspnea on exertion)  CKD (chronic kidney disease): CKD (chronic kidney disease)        PAST MEDICAL & SURGICAL HISTORY:  RICHARDS (dyspnea on exertion)  VT (ventricular tachycardia)  HTN (hypertension)  CAD (coronary artery disease)  CKD (chronic kidney disease): stage IV  Arrhythmia  AV block, 1st degree  DM (diabetes mellitus)  H/O angioplasty: ,  no  intervention  H/O left knee surgery  H/O circumcision: at  age  65      Allergies: Toprol-XL (Rash)      Medications: acetaminophen   Tablet 650milliGRAM(s) Oral every 6 hours PRN  acetaminophen   Tablet. 650milliGRAM(s) Oral every 6 hours PRN  aspirin  chewable 81milliGRAM(s) Oral daily  sodium bicarbonate 650milliGRAM(s) Oral four times a day  flecainide 100milliGRAM(s) Oral every 12 hours  atorvastatin 20milliGRAM(s) Oral at bedtime  multivitamin 1Tablet(s) Oral daily  insulin lispro (HumaLOG) corrective regimen sliding scale  SubCutaneous three times a day before meals  dextrose 5%. 1000milliLiter(s) IV Continuous <Continuous>  dextrose Gel 1Dose(s) Oral once PRN  dextrose 50% Injectable 12.5Gram(s) IV Push once  dextrose 50% Injectable 25Gram(s) IV Push once  dextrose 50% Injectable 25Gram(s) IV Push once  glucagon  Injectable 1milliGRAM(s) IntraMuscular once PRN  hydrALAZINE 25milliGRAM(s) Oral every 8 hours  furosemide Infusion 10mG/Hr IV Continuous <Continuous>  epoetin juan Injectable [id number](s) SubCutaneous <User Schedule>  ferrous    sulfate 325milliGRAM(s) Oral three times a day with meals  potassium chloride    Tablet ER 40milliEquivalent(s) Oral daily      FAMILY HISTORY:  Family history of cancer (Father)  No pertinent family history in first degree relatives  : non-contributory    Social History:     Ambulation: Walking independently [ ] With Cane [ ] With Walker [ ]  Bedbound [ ]                           10.1   8.8   )-----------( 180      ( 20 May 2017 05:25 )             30.5     05-20    145  |  106  |  75.0<H>  ----------------------------<  111  3.8   |  20.0<L>  |  3.10<H>    Ca    9.2      20 May 2017 05:25  Phos  5.3     05-20  Mg     2.2         TPro  7.2  /  Alb  4.2  /  TBili  0.2<L>  /  DBili  x   /  AST  26  /  ALT  37  /  AlkPhos  132<H>        PHYSICAL EXAM:    Vital Signs Last 24 Hrs  T(C): 36.7, Max: 37.1 ( @ 21:52)  T(F): 98, Max: 98.7 ( @ 21:52)  HR: 54 (54 - 56)  BP: 134/68 (134/68 - 189/87)  BP(mean): --  RR: 14 (12 - 24)  SpO2: 100% (97% - 100%)  Daily Height in cm: 165.1 (19 May 2017 16:31)    Daily Weight in k.1 (20 May 2017 06:58)    PE:    Pt sitting in chair, family at bedside  right shoulder without obvious swelling or deformity  no ecchymosis, no erythema, no lacerations or abrasions noted  Right shoulder tender anteriorly  flexion of shoulder to 110 degrees with pain, abduction to approx 90 with pain  pain with internal/external rotation   MS 5/5 B/L, sensation intact, pulses palp, cap refill brisk      Imaging Studies:  xrays right shoulder ap/lat/y view    glenohumeral joint space narrowing  AC DJD with bone spur     A/P: right shoulder ac osteoarthritis    PLAN:   steroid injection  rest, ice  follow up in office with Dr. De Oliveira PRN      40mg depomedrol/7cc 1%lidocaine injected into the subacromial space after being cleansed with chloroprep.  No complications.  area covered with bandaid  Discussed with Dr. Champagne
HPI: Patient is an 83 year old male, PMHx CKD-4, hypertension, ventricular bigeminy, carotid disease, anemia, diabetes mellitus admitted with worsening leg swelling, increasing dyspnea on exertion. Pt also with right shoulder injury for which he took 9-10 tablets of Ibuprofen, which likely contributed to some acute kidney injury. Pt denies chest pain, shortness of breath, fever, chills, nausea vomiting at this time. No other complaints.    PMH:  Arrhythmia    AV block, 1st degree    CAD (coronary artery disease)    CKD (chronic kidney disease)  stage IV  DM (diabetes mellitus)    RICHARDS (dyspnea on exertion)    HTN (hypertension)    VT (ventricular tachycardia)    PSH:  H/O angioplasty  ,  no  intervention  H/O circumcision  at  age  65  H/O left knee surgery    Allergies: Toprol-XL (Rash)    PMD: Irvin  Cardio: [place]- Dr. Walter,  GI: [place]    MEDICATIONS  (STANDING):  aspirin  chewable 81milliGRAM(s) Oral daily  sodium bicarbonate 650milliGRAM(s) Oral four times a day  flecainide 100milliGRAM(s) Oral every 12 hours  atorvastatin 20milliGRAM(s) Oral at bedtime  multivitamin 1Tablet(s) Oral daily  insulin lispro (HumaLOG) corrective regimen sliding scale  SubCutaneous three times a day before meals  dextrose 5%. 1000milliLiter(s) IV Continuous <Continuous>  dextrose 50% Injectable 12.5Gram(s) IV Push once  dextrose 50% Injectable 25Gram(s) IV Push once  dextrose 50% Injectable 25Gram(s) IV Push once  hydrALAZINE 25milliGRAM(s) Oral every 8 hours  epoetin juan Injectable [id number](s) SubCutaneous <User Schedule>  ferrous    sulfate 325milliGRAM(s) Oral three times a day with meals  potassium chloride    Tablet ER 40milliEquivalent(s) Oral daily  furosemide    Tablet 40milliGRAM(s) Oral daily    MEDICATIONS  (PRN):  acetaminophen   Tablet 650milliGRAM(s) Oral every 6 hours PRN For Temp greater than 38 C (100.4 F)  acetaminophen   Tablet. 650milliGRAM(s) Oral every 6 hours PRN Moderate Pain (4 - 6)  dextrose Gel 1Dose(s) Oral once PRN Blood Glucose LESS THAN 70 milliGRAM(s)/deciliter  glucagon  Injectable 1milliGRAM(s) IntraMuscular once PRN Glucose LESS THAN 70 milligrams/deciliter    Review of Systems: - Negative except as mentioned above    Vital Signs Last 24 Hrs  T(C): 36.7, Max: 37.1 ( @ 21:52)  T(F): 98, Max: 98.7 ( @ 21:52)  HR: 62 (54 - 62)  BP: 130/76 (130/76 - 189/87)  BP(mean): --  RR: 16 (12 - 24)  SpO2: 99% (97% - 100%)    PE  Gen: NAD, AAOx3  Pulm: CTAB  CV: RRR  Abd: soft, nontender  Ext: moving all extremities  Vasc: 2+ pulses all extremities, brisk cap refill all digits  Neuro: GCS 15, nonfocal      I&O's Detail  I & Os for 24h ending 20 May 2017 07:00  =============================================  IN:    Oral Fluid: 480 ml    furosemide Infusion: 5 ml    Total IN: 485 ml  ---------------------------------------------  OUT:    Voided: 2885 ml    Total OUT: 2885 ml  ---------------------------------------------  Total NET: -2400 ml    I & Os for current day (as of 20 May 2017 16:19)  =============================================  IN:    Oral Fluid: 240 ml    Total IN: 240 ml  ---------------------------------------------  OUT:    Voided: 1050 ml    Total OUT: 1050 ml  ---------------------------------------------  Total NET: -810 ml      LABS:                        10.1   8.8   )-----------( 180      ( 20 May 2017 05:25 )             30.5     05-20    x   |  x   |  x   ----------------------------<  x   4.3   |  x   |  x     Ca    9.2      20 May 2017 05:25  Phos  5.3     05-20  Mg     2.2     -20    TPro  7.2  /  Alb  4.2  /  TBili  0.2<L>  /  DBili  x   /  AST  26  /  ALT  37  /  AlkPhos  132<H>  05-19    PT/INR - ( 20 May 2017 05:25 )   PT: 12.4 sec;   INR: 1.12 ratio         PTT - ( 19 May 2017 17:49 )  PTT:36.3 sec  Urinalysis Basic - ( 19 May 2017 17:50 )    Color: Yellow / Appearance: Clear / S.010 / pH: x  Gluc: x / Ketone: Negative  / Bili: Negative / Urobili: Negative mg/dL   Blood: x / Protein: 100 mg/dL / Nitrite: Negative   Leuk Esterase: Negative / RBC: x / WBC 0-2   Sq Epi: x / Non Sq Epi: x / Bacteria: x
[place] [facility]                                   [address] [place] [address]                                                      PHONE: [phone]                                                         FAX: [phone]  [email or web address]  ---------------------------------------------------------------------------------------------------------------------------------    83y Male with past medical history as under presenting with worsening SOb and leg swelling. No cp    PAST MEDICAL & SURGICAL HISTORY:  RICHARDS (dyspnea on exertion)  VT (ventricular tachycardia)  HTN (hypertension)  CAD (coronary artery disease)  CKD (chronic kidney disease): stage IV  Arrhythmia  AV block, 1st degree  DM (diabetes mellitus)  H/O angioplasty: ,  no  intervention  H/O left knee surgery  H/O circumcision: at  age  65      Toprol-XL (Rash)      MEDICATIONS  (STANDING):  aspirin  chewable 81milliGRAM(s) Oral daily  sodium bicarbonate 650milliGRAM(s) Oral four times a day  flecainide 100milliGRAM(s) Oral every 12 hours  atorvastatin 20milliGRAM(s) Oral at bedtime  multivitamin 1Tablet(s) Oral daily  insulin lispro (HumaLOG) corrective regimen sliding scale  SubCutaneous three times a day before meals  dextrose 5%. 1000milliLiter(s) IV Continuous <Continuous>  dextrose 50% Injectable 12.5Gram(s) IV Push once  dextrose 50% Injectable 25Gram(s) IV Push once  dextrose 50% Injectable 25Gram(s) IV Push once  hydrALAZINE 25milliGRAM(s) Oral every 8 hours  furosemide Infusion 10mG/Hr IV Continuous <Continuous>  epoetin juan Injectable [id number](s) SubCutaneous <User Schedule>  ferrous    sulfate 325milliGRAM(s) Oral three times a day with meals  potassium chloride    Tablet ER 40milliEquivalent(s) Oral daily    MEDICATIONS  (PRN):  acetaminophen   Tablet 650milliGRAM(s) Oral every 6 hours PRN For Temp greater than 38 C (100.4 F)  acetaminophen   Tablet. 650milliGRAM(s) Oral every 6 hours PRN Moderate Pain (4 - 6)  dextrose Gel 1Dose(s) Oral once PRN Blood Glucose LESS THAN 70 milliGRAM(s)/deciliter  glucagon  Injectable 1milliGRAM(s) IntraMuscular once PRN Glucose LESS THAN 70 milligrams/deciliter      Social History:  No smoking   No alcohol  No     ROS: Negative other than as mentioned in HPI.    Vital Signs Last 24 Hrs  T(C): 37.1, Max: 37.1 ( @ 21:52)  T(F): 98.7, Max: 98.7 ( @ 21:52)  HR: 56 (54 - 56)  BP: 177/67 (142/81 - 189/87)  BP(mean): --  RR: 12 (12 - 24)  SpO2: 99% (97% - 99%)  ICU Vital Signs Last 24 Hrs  CHRISTIANO ELIZABETH  I&O's Detail    I & Os for current day (as of 19 May 2017 22:54)  =============================================  IN:    Total IN: 0 ml  ---------------------------------------------  OUT:    Voided: 925 ml    Total OUT: 925 ml  ---------------------------------------------  Total NET: -925 ml    I&O's Summary    I & Os for current day (as of 19 May 2017 22:54)  =============================================  IN: 0 ml / OUT: 925 ml / NET: -925 ml    Drug Dosing Weight  CHRISTIANO ELIZABETH      PHYSICAL EXAM:    HEENT:  No JVD  CARDIOVASCULAR: Normal S1 and S2, No murmur, rub, lift.   LUNGS: No rales, rhonchi or wheeze. Normal breath sounds bilaterally.  ABDOMEN: Soft, nontender without mass or organomegaly. bowel sounds normoactive.  EXTREMITIES: No clubbing, cyanosis or edema          LABS:                        9.7    9.8   )-----------( 180      ( 19 May 2017 17:49 )             30.1     -    140  |  103  |  69.0<H>  ----------------------------<  119<H>  3.9   |  20.0<L>  |  3.26<H>    Ca    8.8      19 May 2017 17:49    TPro  7.2  /  Alb  4.2  /  TBili  0.2<L>  /  DBili  x   /  AST  26  /  ALT  37  /  AlkPhos  132<H>      CHRISTIANO ELIZABETH  CARDIAC MARKERS ( 19 May 2017 17:49 )  x     / 0.01 ng/mL / x     / x     / x          PT/INR - ( 19 May 2017 17:49 )   PT: 12.2 sec;   INR: 1.11 ratio         PTT - ( 19 May 2017 17:49 )  PTT:36.3 sec  Urinalysis Basic - ( 19 May 2017 17:50 )    Color: Yellow / Appearance: Clear / S.010 / pH: x  Gluc: x / Ketone: Negative  / Bili: Negative / Urobili: Negative mg/dL   Blood: x / Protein: 100 mg/dL / Nitrite: Negative   Leuk Esterase: Negative / RBC: x / WBC 0-2   Sq Epi: x / Non Sq Epi: x / Bacteria: x        Assessment and Plan:  In summary, CHRISTIANO ELIZABETH is an 83y Male with past medical history significant for CAD, HTN, CRI, HTN, presenting worseining SOB and leg swelling.   CHF: patient being started on lasix drip, will monitor renal function closely. Will follow trop and check  Renal failure
[place] [facility]                                   [address] [place] [address]                                                      PHONE: [phone]                                                         FAX: [phone]  [email or web address]  ---------------------------------------------------------------------------------------------------------------------------------    HPI:  CHRISTIANO ELIZABETH is an 83y Male w/ hx arrhythmia/renal insufficiency presents w/ inc LE edema/no sob or cp.Inc NSAID use due to R shoulder pain. No HX MI/CHF.      PAST MEDICAL & SURGICAL HISTORY:  RICHARDS (dyspnea on exertion)  VT (ventricular tachycardia)  HTN (hypertension)  CAD (coronary artery disease)  CKD (chronic kidney disease): stage IV  Arrhythmia  AV block, 1st degree  DM (diabetes mellitus)  H/O angioplasty: ,  no  intervention  H/O left knee surgery  H/O circumcision: at  age  65      Toprol-XL (Rash)      MEDICATIONS  (STANDING):  aspirin  chewable 81milliGRAM(s) Oral daily  sodium bicarbonate 650milliGRAM(s) Oral four times a day  flecainide 100milliGRAM(s) Oral every 12 hours  atorvastatin 20milliGRAM(s) Oral at bedtime  multivitamin 1Tablet(s) Oral daily  insulin lispro (HumaLOG) corrective regimen sliding scale  SubCutaneous three times a day before meals  dextrose 5%. 1000milliLiter(s) IV Continuous <Continuous>  dextrose 50% Injectable 12.5Gram(s) IV Push once  dextrose 50% Injectable 25Gram(s) IV Push once  dextrose 50% Injectable 25Gram(s) IV Push once  hydrALAZINE 25milliGRAM(s) Oral every 8 hours  epoetin juan Injectable [id number](s) SubCutaneous <User Schedule>  ferrous    sulfate 325milliGRAM(s) Oral three times a day with meals  potassium chloride    Tablet ER 40milliEquivalent(s) Oral daily  furosemide    Tablet 40milliGRAM(s) Oral daily    MEDICATIONS  (PRN):  acetaminophen   Tablet 650milliGRAM(s) Oral every 6 hours PRN For Temp greater than 38 C (100.4 F)  acetaminophen   Tablet. 650milliGRAM(s) Oral every 6 hours PRN Moderate Pain (4 - 6)  dextrose Gel 1Dose(s) Oral once PRN Blood Glucose LESS THAN 70 milliGRAM(s)/deciliter  glucagon  Injectable 1milliGRAM(s) IntraMuscular once PRN Glucose LESS THAN 70 milligrams/deciliter      Family History: Pt denies hx of early cad, SCD, or congenital heart disease.      Social History:  Cigarettes:                    Alchohol:                 Illicit Drug Abuse:      ROS:  Constitutional: No fever, weight loss or fatigue  Eyes: No eye pain, visual disturbances, or discharge  ENMT:  No difficulty hearing, tinnitus, vertigo; No sinus or throat pain  Neck: No pain or stiffness  Respiratory: No cough, wheezing, chills or hemoptysis  Cardiovascular: No chest pain, palpitations, shortness of breath, dizziness or leg swelling  Gastrointestinal: No abdominal or epigastric pain. No nausea, vomiting or hematemesis; No diarrhea or constipation. No melena or hematochezia.  Genitourinary: No dysuria, frequency, hematuria or incontinence  Rectal: No pain, hemorrhoids or incontinence  Neurological: No headaches, memory loss, loss of strength, numbness or tremors  Skin: No itching, burning, rashes or lesions   Lymph Nodes: No enlarged glands  Endocrine: No heat or cold intolerance; No hair loss  Musculoskeletal: No joint pain or swelling; No muscle, back or extremity pain  Psychiatric: No depression, anxiety, mood swings or difficulty sleeping  Heme/Lymph: No easy bruising or bleeding gums  Allergy and Immunologic: No hives or eczema    Vital Signs Last 24 Hrs  T(C): 36.7, Max: 37.1 (05-19 @ 21:52)  T(F): 98, Max: 98.7 (05-19 @ 21:52)  HR: 62 (54 - 62)  BP: 130/76 (130/76 - 189/87)  BP(mean): --  RR: 16 (12 - 20)  SpO2: 99% (98% - 100%)  ICU Vital Signs Last 24 Hrs  CHRISTIANO JHACRYSTAL  I&O's Detail  I & Os for 24h ending 20 May 2017 07:00  =============================================  IN:    Oral Fluid: 480 ml    furosemide Infusion: 5 ml    Total IN: 485 ml  ---------------------------------------------  OUT:    Voided: 2885 ml    Total OUT: 2885 ml  ---------------------------------------------  Total NET: -2400 ml    I & Os for current day (as of 20 May 2017 18:08)  =============================================  IN:    Oral Fluid: 640 ml    Total IN: 640 ml  ---------------------------------------------  OUT:    Voided: 1050 ml    Total OUT: 1050 ml  ---------------------------------------------  Total NET: -410 ml    I&O's Summary  I & Os for 24h ending 20 May 2017 07:00  =============================================  IN: 485 ml / OUT: 2885 ml / NET: -2400 ml    I & Os for current day (as of 20 May 2017 18:08)  =============================================  IN: 640 ml / OUT: 1050 ml / NET: -410 ml    Drug Dosing Weight  CHRISTIANO ELIZABETH      PHYSICAL EXAM:  General: Appears well developed, well nourished alert and cooperative.  HEENT: Head; normocephalic, atraumatic.  Eyes: Pupils reactive, cornea wnl.  Neck: Supple, no nodes adenopathy, no NVD or carotid bruit or thyromegaly.  CARDIOVASCULAR: Normal S1 and S2, No murmur, rub, gallop or lift.   LUNGS: No rales, rhonchi or wheeze. Normal breath sounds bilaterally.  ABDOMEN: Soft, nontender without mass or organomegaly. bowel sounds normoactive.  EXTREMITIES: No clubbing, cyanosis Mod  edema. Distal pulses wnl.   SKIN: warm and dry with normal turgor.  NEURO: Alert/oriented x 3/normal motor exam. No pathologic reflexes.    PSYCH: normal affect.        LABS:                        10.1   8.8   )-----------( 180      ( 20 May 2017 05:25 )             30.5     05-20    x   |  x   |  x   ----------------------------<  x   4.3   |  x   |  x     Ca    9.2      20 May 2017 05:25  Phos  5.3     05-20  Mg     2.2     05-20    TPro  7.2  /  Alb  4.2  /  TBili  0.2<L>  /  DBili  x   /  AST  26  /  ALT  37  /  AlkPhos  132<H>  05-    CHRISTIANO ELIZABEHT  CARDIAC MARKERS ( 19 May 2017 17:49 )  x     / 0.01 ng/mL / x     / x     / x          PT/INR - ( 20 May 2017 05:25 )   PT: 12.4 sec;   INR: 1.12 ratio         PTT - ( 19 May 2017 17:49 )  PTT:36.3 sec  Urinalysis Basic - ( 19 May 2017 17:50 )    Color: Yellow / Appearance: Clear / S.010 / pH: x  Gluc: x / Ketone: Negative  / Bili: Negative / Urobili: Negative mg/dL   Blood: x / Protein: 100 mg/dL / Nitrite: Negative   Leuk Esterase: Negative / RBC: x / WBC 0-2   Sq Epi: x / Non Sq Epi: x / Bacteria: x        RADIOLOGY & ADDITIONAL STUDIES:    INTERPRETATION OF TELEMETRY (personally reviewed):    ECG:    ECHO:    STRESS TEST:    CARDIAC CATHETERIZATION:    Assessment and Plan:  In summary, CHRISTIANO ELIZABETH is an 83y Male with past medical history significant for renal insufficiency/arrhythmia presents w LE  edema-clear lungs-prelim echo-normal LV and RV fxn w/ no sig valve disease. Trop neg. Cr 3/BUN 70 after NSAID use.  If AV fistula nec-can be cleared from cardiac point of view.  Likely diastolic dysfxn in setting of adv renal disease-would likely benefit from CardioMems to help w diuretic dose on an outpt basis.            Thank you for allowing [facility] to participate in the care of this patient.  Please feel free to call with any questions or concerns.

## 2017-05-20 NOTE — CONSULT NOTE ADULT - PROBLEM SELECTOR RECOMMENDATION 9
-no emergent indication for dialysis at this time  -plan for AVF creation this admission if possible, or as out patient  -f/u cardiology recommendations for optimization & risk stratification  -continue diuresis  -will discuss with vascular attending (pt known to Dr. Sorenson)  -call vascular surgery team with questions or concerns

## 2017-05-20 NOTE — CONSULT NOTE ADULT - ASSESSMENT
83 yr old male with CKD - 4 , hypertension, ventricular bigeminy, carotid disease, anemia, diabetes mellitus admitted with Increasing Pedal Edema, Pallor, RICHARDS & [place] eGFR w. Early uremia

## 2017-05-20 NOTE — PROGRESS NOTE ADULT - ASSESSMENT
83 yr old male with CKD, hypertension, ventricular bigeminy, carotid disease, anemia, diabetes mellitus admitted with leg swelling. States that he had noted his leg swelling to be worse over the last few weeks and was unable to wear his shoe today. He went to see his nephrologist who advised him to come to the ED. He reports that he walks about 50 feet and feels tired and has to sit down. He has been suffering from right shoulder pain for which he took Ibuprofen, about 9-10 tablets a day, this was a few days ago. He denies chest pain. He was started on iron supplements. Denies abdominal pain. Reports had black stools this aM  PT OOB IN CHAIR FEEL  BETTER  CKD RENAL FOLLOW UP

## 2017-05-21 LAB
ALBUMIN SERPL ELPH-MCNC: 4.3 G/DL — SIGNIFICANT CHANGE UP (ref 3.3–5.2)
ALP SERPL-CCNC: 148 U/L — HIGH (ref 40–120)
ALT FLD-CCNC: 32 U/L — SIGNIFICANT CHANGE UP
ANION GAP SERPL CALC-SCNC: 15 MMOL/L — SIGNIFICANT CHANGE UP (ref 5–17)
AST SERPL-CCNC: 19 U/L — SIGNIFICANT CHANGE UP
BILIRUB SERPL-MCNC: 0.4 MG/DL — SIGNIFICANT CHANGE UP (ref 0.4–2)
BUN SERPL-MCNC: 81 MG/DL — HIGH (ref 8–20)
CALCIUM SERPL-MCNC: 9 MG/DL — SIGNIFICANT CHANGE UP (ref 8.4–10.5)
CALCIUM SERPL-MCNC: 9.5 MG/DL — SIGNIFICANT CHANGE UP (ref 8.6–10.2)
CHLORIDE SERPL-SCNC: 98 MMOL/L — SIGNIFICANT CHANGE UP (ref 98–107)
CO2 SERPL-SCNC: 26 MMOL/L — SIGNIFICANT CHANGE UP (ref 22–29)
CREAT SERPL-MCNC: 3.31 MG/DL — HIGH (ref 0.5–1.3)
FERRITIN SERPL-MCNC: 56.4 NG/ML — SIGNIFICANT CHANGE UP (ref 30–400)
GLUCOSE SERPL-MCNC: 139 MG/DL — HIGH (ref 70–115)
HCT VFR BLD CALC: 35.1 % — LOW (ref 42–52)
HGB BLD-MCNC: 11.3 G/DL — LOW (ref 14–18)
IRON SATN MFR SERPL: 18 % — SIGNIFICANT CHANGE UP (ref 16–55)
IRON SATN MFR SERPL: 55 UG/DL — LOW (ref 59–158)
MCHC RBC-ENTMCNC: 28.5 PG — SIGNIFICANT CHANGE UP (ref 27–31)
MCHC RBC-ENTMCNC: 32.2 G/DL — SIGNIFICANT CHANGE UP (ref 32–36)
MCV RBC AUTO: 88.4 FL — SIGNIFICANT CHANGE UP (ref 80–94)
NT-PROBNP SERPL-SCNC: 2297 PG/ML — HIGH (ref 0–300)
PLATELET # BLD AUTO: 206 K/UL — SIGNIFICANT CHANGE UP (ref 150–400)
POTASSIUM SERPL-MCNC: 4.1 MMOL/L — SIGNIFICANT CHANGE UP (ref 3.5–5.3)
POTASSIUM SERPL-MCNC: 4.6 MMOL/L — SIGNIFICANT CHANGE UP (ref 3.5–5.3)
POTASSIUM SERPL-SCNC: 4.1 MMOL/L — SIGNIFICANT CHANGE UP (ref 3.5–5.3)
POTASSIUM SERPL-SCNC: 4.6 MMOL/L — SIGNIFICANT CHANGE UP (ref 3.5–5.3)
PROT SERPL-MCNC: 8 G/DL — SIGNIFICANT CHANGE UP (ref 6.6–8.7)
RBC # BLD: 3.97 M/UL — LOW (ref 4.6–6.2)
RBC # FLD: 15 % — SIGNIFICANT CHANGE UP (ref 11–15.6)
SODIUM SERPL-SCNC: 139 MMOL/L — SIGNIFICANT CHANGE UP (ref 135–145)
TIBC SERPL-MCNC: 306 UG/DL — SIGNIFICANT CHANGE UP (ref 220–430)
TRANSFERRIN SERPL-MCNC: 214 MG/DL — SIGNIFICANT CHANGE UP (ref 180–329)
WBC # BLD: 10 K/UL — SIGNIFICANT CHANGE UP (ref 4.8–10.8)
WBC # FLD AUTO: 10 K/UL — SIGNIFICANT CHANGE UP (ref 4.8–10.8)

## 2017-05-21 PROCEDURE — 99233 SBSQ HOSP IP/OBS HIGH 50: CPT

## 2017-05-21 RX ORDER — NIFEDIPINE 30 MG
60 TABLET, EXTENDED RELEASE 24 HR ORAL DAILY
Qty: 0 | Refills: 0 | Status: DISCONTINUED | OUTPATIENT
Start: 2017-05-21 | End: 2017-05-23

## 2017-05-21 RX ORDER — HYDRALAZINE HCL 50 MG
5 TABLET ORAL ONCE
Qty: 0 | Refills: 0 | Status: COMPLETED | OUTPATIENT
Start: 2017-05-21 | End: 2017-05-21

## 2017-05-21 RX ORDER — HYDRALAZINE HCL 50 MG
50 TABLET ORAL EVERY 12 HOURS
Qty: 0 | Refills: 0 | Status: DISCONTINUED | OUTPATIENT
Start: 2017-05-21 | End: 2017-05-23

## 2017-05-21 RX ORDER — SODIUM BICARBONATE 1 MEQ/ML
650 SYRINGE (ML) INTRAVENOUS THREE TIMES A DAY
Qty: 0 | Refills: 0 | Status: DISCONTINUED | OUTPATIENT
Start: 2017-05-21 | End: 2017-05-23

## 2017-05-21 RX ADMIN — Medication 650 MILLIGRAM(S): at 12:44

## 2017-05-21 RX ADMIN — Medication 40 MILLIEQUIVALENT(S): at 12:44

## 2017-05-21 RX ADMIN — Medication 100 MILLIGRAM(S): at 10:49

## 2017-05-21 RX ADMIN — Medication 325 MILLIGRAM(S): at 12:43

## 2017-05-21 RX ADMIN — Medication 1: at 12:36

## 2017-05-21 RX ADMIN — Medication 325 MILLIGRAM(S): at 17:30

## 2017-05-21 RX ADMIN — Medication 1: at 17:31

## 2017-05-21 RX ADMIN — Medication 650 MILLIGRAM(S): at 22:24

## 2017-05-21 RX ADMIN — Medication 50 MILLIGRAM(S): at 17:30

## 2017-05-21 RX ADMIN — Medication 1 TABLET(S): at 12:44

## 2017-05-21 RX ADMIN — Medication 325 MILLIGRAM(S): at 08:44

## 2017-05-21 RX ADMIN — Medication 5 MILLIGRAM(S): at 08:44

## 2017-05-21 RX ADMIN — IRON SUCROSE 210 MILLIGRAM(S): 20 INJECTION, SOLUTION INTRAVENOUS at 12:38

## 2017-05-21 RX ADMIN — Medication 25 MILLIGRAM(S): at 05:26

## 2017-05-21 RX ADMIN — Medication 650 MILLIGRAM(S): at 05:26

## 2017-05-21 RX ADMIN — Medication 81 MILLIGRAM(S): at 12:43

## 2017-05-21 RX ADMIN — Medication 60 MILLIGRAM(S): at 10:53

## 2017-05-21 RX ADMIN — Medication 5 MILLIGRAM(S): at 22:29

## 2017-05-21 RX ADMIN — Medication 100 MILLIGRAM(S): at 17:30

## 2017-05-21 RX ADMIN — Medication 40 MILLIGRAM(S): at 05:26

## 2017-05-21 RX ADMIN — ATORVASTATIN CALCIUM 20 MILLIGRAM(S): 80 TABLET, FILM COATED ORAL at 22:24

## 2017-05-22 DIAGNOSIS — I10 ESSENTIAL (PRIMARY) HYPERTENSION: ICD-10-CM

## 2017-05-22 LAB
24R-OH-CALCIDIOL SERPL-MCNC: 26.7 NG/ML — LOW (ref 30–100)
ALBUMIN SERPL ELPH-MCNC: 3.7 G/DL — SIGNIFICANT CHANGE UP (ref 3.3–5.2)
ANION GAP SERPL CALC-SCNC: 18 MMOL/L — HIGH (ref 5–17)
BUN SERPL-MCNC: 89 MG/DL — HIGH (ref 8–20)
CALCIUM SERPL-MCNC: 9 MG/DL — SIGNIFICANT CHANGE UP (ref 8.6–10.2)
CHLORIDE SERPL-SCNC: 104 MMOL/L — SIGNIFICANT CHANGE UP (ref 98–107)
CO2 SERPL-SCNC: 21 MMOL/L — LOW (ref 22–29)
CREAT SERPL-MCNC: 3.81 MG/DL — HIGH (ref 0.5–1.3)
GLUCOSE SERPL-MCNC: 123 MG/DL — HIGH (ref 70–115)
HCT VFR BLD CALC: 30.7 % — LOW (ref 42–52)
HGB BLD-MCNC: 10.2 G/DL — LOW (ref 14–18)
MCHC RBC-ENTMCNC: 29 PG — SIGNIFICANT CHANGE UP (ref 27–31)
MCHC RBC-ENTMCNC: 33.2 G/DL — SIGNIFICANT CHANGE UP (ref 32–36)
MCV RBC AUTO: 87.2 FL — SIGNIFICANT CHANGE UP (ref 80–94)
PHOSPHATE SERPL-MCNC: 4.5 MG/DL — SIGNIFICANT CHANGE UP (ref 2.4–4.7)
PLATELET # BLD AUTO: 186 K/UL — SIGNIFICANT CHANGE UP (ref 150–400)
POTASSIUM SERPL-MCNC: 4.3 MMOL/L — SIGNIFICANT CHANGE UP (ref 3.5–5.3)
POTASSIUM SERPL-SCNC: 4.3 MMOL/L — SIGNIFICANT CHANGE UP (ref 3.5–5.3)
PTH-INTACT FLD-MCNC: 170 PG/ML — HIGH (ref 15–65)
RBC # BLD: 3.52 M/UL — LOW (ref 4.6–6.2)
RBC # BLD: 3.52 M/UL — LOW (ref 4.6–6.2)
RBC # FLD: 15 % — SIGNIFICANT CHANGE UP (ref 11–15.6)
RETICS #: 58.1 K/UL — SIGNIFICANT CHANGE UP (ref 25–125)
RETICS/RBC NFR: 1.6 % — SIGNIFICANT CHANGE UP (ref 0.5–2.6)
SODIUM SERPL-SCNC: 143 MMOL/L — SIGNIFICANT CHANGE UP (ref 135–145)
WBC # BLD: 9.4 K/UL — SIGNIFICANT CHANGE UP (ref 4.8–10.8)
WBC # FLD AUTO: 9.4 K/UL — SIGNIFICANT CHANGE UP (ref 4.8–10.8)

## 2017-05-22 PROCEDURE — 99233 SBSQ HOSP IP/OBS HIGH 50: CPT

## 2017-05-22 RX ORDER — CALCITRIOL 0.5 UG/1
0.25 CAPSULE ORAL DAILY
Qty: 0 | Refills: 0 | Status: DISCONTINUED | OUTPATIENT
Start: 2017-05-22 | End: 2017-05-23

## 2017-05-22 RX ORDER — SODIUM CHLORIDE 9 MG/ML
1000 INJECTION, SOLUTION INTRAVENOUS
Qty: 0 | Refills: 0 | Status: DISCONTINUED | OUTPATIENT
Start: 2017-05-23 | End: 2017-05-23

## 2017-05-22 RX ADMIN — Medication 650 MILLIGRAM(S): at 21:18

## 2017-05-22 RX ADMIN — Medication 20 MILLIGRAM(S): at 06:00

## 2017-05-22 RX ADMIN — Medication 60 MILLIGRAM(S): at 06:00

## 2017-05-22 RX ADMIN — Medication 100 MILLIGRAM(S): at 06:03

## 2017-05-22 RX ADMIN — Medication 650 MILLIGRAM(S): at 09:59

## 2017-05-22 RX ADMIN — Medication 81 MILLIGRAM(S): at 11:45

## 2017-05-22 RX ADMIN — Medication 325 MILLIGRAM(S): at 18:17

## 2017-05-22 RX ADMIN — IRON SUCROSE 210 MILLIGRAM(S): 20 INJECTION, SOLUTION INTRAVENOUS at 11:45

## 2017-05-22 RX ADMIN — Medication: at 12:30

## 2017-05-22 RX ADMIN — Medication 40 MILLIEQUIVALENT(S): at 11:45

## 2017-05-22 RX ADMIN — Medication 1 TABLET(S): at 11:45

## 2017-05-22 RX ADMIN — Medication 325 MILLIGRAM(S): at 09:59

## 2017-05-22 RX ADMIN — Medication 2: at 18:23

## 2017-05-22 RX ADMIN — CALCITRIOL 0.25 MICROGRAM(S): 0.5 CAPSULE ORAL at 12:07

## 2017-05-22 RX ADMIN — Medication 650 MILLIGRAM(S): at 18:17

## 2017-05-22 RX ADMIN — ATORVASTATIN CALCIUM 20 MILLIGRAM(S): 80 TABLET, FILM COATED ORAL at 21:18

## 2017-05-22 RX ADMIN — Medication 100 MILLIGRAM(S): at 18:17

## 2017-05-22 RX ADMIN — Medication 325 MILLIGRAM(S): at 11:45

## 2017-05-22 RX ADMIN — Medication 50 MILLIGRAM(S): at 18:17

## 2017-05-22 RX ADMIN — Medication 50 MILLIGRAM(S): at 06:00

## 2017-05-22 NOTE — PROGRESS NOTE ADULT - ASSESSMENT
83 yr old male with CKD, hypertension, ventricular bigeminy, carotid disease, anemia, diabetes mellitus admitted with leg swelling. States that he had noted his leg swelling to be worse over the last few weeks and was unable to wear his shoe today. He went to see his nephrologist who advised him to come to the ED. He reports that he walks about 50 feet and feels tired and has to sit down. He has been suffering from right shoulder pain for which he took Ibuprofen, about 9-10 tablets a day, this was a few days ago. He denies chest pain. He was started on iron supplements. Denies abdominal pain. Reports had black stools this aM

## 2017-05-23 LAB
ALBUMIN SERPL ELPH-MCNC: 3.5 G/DL — SIGNIFICANT CHANGE UP (ref 3.3–5.2)
ALP SERPL-CCNC: 119 U/L — SIGNIFICANT CHANGE UP (ref 40–120)
ALT FLD-CCNC: 25 U/L — SIGNIFICANT CHANGE UP
ANION GAP SERPL CALC-SCNC: 13 MMOL/L — SIGNIFICANT CHANGE UP (ref 5–17)
APTT BLD: 34.3 SEC — SIGNIFICANT CHANGE UP (ref 27.5–37.4)
AST SERPL-CCNC: 16 U/L — SIGNIFICANT CHANGE UP
BILIRUB SERPL-MCNC: 0.3 MG/DL — LOW (ref 0.4–2)
BLD GP AB SCN SERPL QL: SIGNIFICANT CHANGE UP
BUN SERPL-MCNC: 91 MG/DL — HIGH (ref 8–20)
CALCIUM SERPL-MCNC: 8.7 MG/DL — SIGNIFICANT CHANGE UP (ref 8.6–10.2)
CHLORIDE SERPL-SCNC: 106 MMOL/L — SIGNIFICANT CHANGE UP (ref 98–107)
CO2 SERPL-SCNC: 22 MMOL/L — SIGNIFICANT CHANGE UP (ref 22–29)
CREAT SERPL-MCNC: 3.72 MG/DL — HIGH (ref 0.5–1.3)
GLUCOSE SERPL-MCNC: 117 MG/DL — HIGH (ref 70–115)
HCT VFR BLD CALC: 30 % — LOW (ref 42–52)
HGB BLD-MCNC: 9.7 G/DL — LOW (ref 14–18)
INR BLD: 1.07 RATIO — SIGNIFICANT CHANGE UP (ref 0.88–1.16)
MAGNESIUM SERPL-MCNC: 2.4 MG/DL — SIGNIFICANT CHANGE UP (ref 1.6–2.6)
MCHC RBC-ENTMCNC: 28.8 PG — SIGNIFICANT CHANGE UP (ref 27–31)
MCHC RBC-ENTMCNC: 32.3 G/DL — SIGNIFICANT CHANGE UP (ref 32–36)
MCV RBC AUTO: 89 FL — SIGNIFICANT CHANGE UP (ref 80–94)
PHOSPHATE SERPL-MCNC: 4.4 MG/DL — SIGNIFICANT CHANGE UP (ref 2.4–4.7)
PLATELET # BLD AUTO: 197 K/UL — SIGNIFICANT CHANGE UP (ref 150–400)
POTASSIUM SERPL-MCNC: 4.8 MMOL/L — SIGNIFICANT CHANGE UP (ref 3.5–5.3)
POTASSIUM SERPL-SCNC: 4.8 MMOL/L — SIGNIFICANT CHANGE UP (ref 3.5–5.3)
PROT SERPL-MCNC: 6.5 G/DL — LOW (ref 6.6–8.7)
PROTHROM AB SERPL-ACNC: 11.8 SEC — SIGNIFICANT CHANGE UP (ref 9.8–12.7)
RBC # BLD: 3.37 M/UL — LOW (ref 4.6–6.2)
RBC # FLD: 14.7 % — SIGNIFICANT CHANGE UP (ref 11–15.6)
SODIUM SERPL-SCNC: 141 MMOL/L — SIGNIFICANT CHANGE UP (ref 135–145)
TYPE + AB SCN PNL BLD: SIGNIFICANT CHANGE UP
WBC # BLD: 8.5 K/UL — SIGNIFICANT CHANGE UP (ref 4.8–10.8)
WBC # FLD AUTO: 8.5 K/UL — SIGNIFICANT CHANGE UP (ref 4.8–10.8)

## 2017-05-23 PROCEDURE — 36821 AV FUSION DIRECT ANY SITE: CPT

## 2017-05-23 PROCEDURE — 99233 SBSQ HOSP IP/OBS HIGH 50: CPT

## 2017-05-23 RX ORDER — FERROUS SULFATE 325(65) MG
325 TABLET ORAL
Qty: 0 | Refills: 0 | Status: DISCONTINUED | OUTPATIENT
Start: 2017-05-23 | End: 2017-05-24

## 2017-05-23 RX ORDER — DEXTROSE 50 % IN WATER 50 %
25 SYRINGE (ML) INTRAVENOUS ONCE
Qty: 0 | Refills: 0 | Status: DISCONTINUED | OUTPATIENT
Start: 2017-05-23 | End: 2017-05-24

## 2017-05-23 RX ORDER — SODIUM CHLORIDE 9 MG/ML
1000 INJECTION, SOLUTION INTRAVENOUS
Qty: 0 | Refills: 0 | Status: DISCONTINUED | OUTPATIENT
Start: 2017-05-23 | End: 2017-05-24

## 2017-05-23 RX ORDER — DEXTROSE 50 % IN WATER 50 %
1 SYRINGE (ML) INTRAVENOUS ONCE
Qty: 0 | Refills: 0 | Status: DISCONTINUED | OUTPATIENT
Start: 2017-05-23 | End: 2017-05-24

## 2017-05-23 RX ORDER — ASPIRIN/CALCIUM CARB/MAGNESIUM 324 MG
81 TABLET ORAL DAILY
Qty: 0 | Refills: 0 | Status: DISCONTINUED | OUTPATIENT
Start: 2017-05-23 | End: 2017-05-24

## 2017-05-23 RX ORDER — SODIUM BICARBONATE 1 MEQ/ML
650 SYRINGE (ML) INTRAVENOUS THREE TIMES A DAY
Qty: 0 | Refills: 0 | Status: DISCONTINUED | OUTPATIENT
Start: 2017-05-23 | End: 2017-05-24

## 2017-05-23 RX ORDER — DEXTROSE 50 % IN WATER 50 %
12.5 SYRINGE (ML) INTRAVENOUS ONCE
Qty: 0 | Refills: 0 | Status: DISCONTINUED | OUTPATIENT
Start: 2017-05-23 | End: 2017-05-24

## 2017-05-23 RX ORDER — NIFEDIPINE 30 MG
60 TABLET, EXTENDED RELEASE 24 HR ORAL DAILY
Qty: 0 | Refills: 0 | Status: DISCONTINUED | OUTPATIENT
Start: 2017-05-23 | End: 2017-05-24

## 2017-05-23 RX ORDER — ERYTHROPOIETIN 10000 [IU]/ML
10000 INJECTION, SOLUTION INTRAVENOUS; SUBCUTANEOUS
Qty: 0 | Refills: 0 | Status: DISCONTINUED | OUTPATIENT
Start: 2017-05-23 | End: 2017-05-24

## 2017-05-23 RX ORDER — IRON SUCROSE 20 MG/ML
100 INJECTION, SOLUTION INTRAVENOUS DAILY
Qty: 0 | Refills: 0 | Status: DISCONTINUED | OUTPATIENT
Start: 2017-05-23 | End: 2017-05-24

## 2017-05-23 RX ORDER — ACETAMINOPHEN 500 MG
650 TABLET ORAL EVERY 6 HOURS
Qty: 0 | Refills: 0 | Status: DISCONTINUED | OUTPATIENT
Start: 2017-05-23 | End: 2017-05-24

## 2017-05-23 RX ORDER — GLUCAGON INJECTION, SOLUTION 0.5 MG/.1ML
1 INJECTION, SOLUTION SUBCUTANEOUS ONCE
Qty: 0 | Refills: 0 | Status: DISCONTINUED | OUTPATIENT
Start: 2017-05-23 | End: 2017-05-24

## 2017-05-23 RX ORDER — HYDRALAZINE HCL 50 MG
50 TABLET ORAL EVERY 12 HOURS
Qty: 0 | Refills: 0 | Status: DISCONTINUED | OUTPATIENT
Start: 2017-05-23 | End: 2017-05-24

## 2017-05-23 RX ORDER — FLECAINIDE ACETATE 50 MG
100 TABLET ORAL EVERY 12 HOURS
Qty: 0 | Refills: 0 | Status: DISCONTINUED | OUTPATIENT
Start: 2017-05-23 | End: 2017-05-24

## 2017-05-23 RX ORDER — FENTANYL CITRATE 50 UG/ML
50 INJECTION INTRAVENOUS
Qty: 0 | Refills: 0 | Status: DISCONTINUED | OUTPATIENT
Start: 2017-05-23 | End: 2017-05-23

## 2017-05-23 RX ORDER — CALCITRIOL 0.5 UG/1
0.25 CAPSULE ORAL DAILY
Qty: 0 | Refills: 0 | Status: DISCONTINUED | OUTPATIENT
Start: 2017-05-23 | End: 2017-05-24

## 2017-05-23 RX ORDER — SODIUM CHLORIDE 9 MG/ML
1000 INJECTION INTRAMUSCULAR; INTRAVENOUS; SUBCUTANEOUS
Qty: 0 | Refills: 0 | Status: DISCONTINUED | OUTPATIENT
Start: 2017-05-23 | End: 2017-05-23

## 2017-05-23 RX ORDER — POTASSIUM CHLORIDE 20 MEQ
40 PACKET (EA) ORAL DAILY
Qty: 0 | Refills: 0 | Status: DISCONTINUED | OUTPATIENT
Start: 2017-05-23 | End: 2017-05-24

## 2017-05-23 RX ORDER — INSULIN LISPRO 100/ML
VIAL (ML) SUBCUTANEOUS
Qty: 0 | Refills: 0 | Status: DISCONTINUED | OUTPATIENT
Start: 2017-05-23 | End: 2017-05-24

## 2017-05-23 RX ORDER — ONDANSETRON 8 MG/1
4 TABLET, FILM COATED ORAL ONCE
Qty: 0 | Refills: 0 | Status: DISCONTINUED | OUTPATIENT
Start: 2017-05-23 | End: 2017-05-23

## 2017-05-23 RX ORDER — ATORVASTATIN CALCIUM 80 MG/1
20 TABLET, FILM COATED ORAL AT BEDTIME
Qty: 0 | Refills: 0 | Status: DISCONTINUED | OUTPATIENT
Start: 2017-05-23 | End: 2017-05-24

## 2017-05-23 RX ADMIN — Medication 60 MILLIGRAM(S): at 05:43

## 2017-05-23 RX ADMIN — SODIUM CHLORIDE 80 MILLILITER(S): 9 INJECTION, SOLUTION INTRAVENOUS at 08:15

## 2017-05-23 RX ADMIN — Medication 100 MILLIGRAM(S): at 05:43

## 2017-05-23 RX ADMIN — Medication 100 MILLIGRAM(S): at 21:54

## 2017-05-23 RX ADMIN — ERYTHROPOIETIN 10000 UNIT(S): 10000 INJECTION, SOLUTION INTRAVENOUS; SUBCUTANEOUS at 11:51

## 2017-05-23 RX ADMIN — Medication 650 MILLIGRAM(S): at 08:06

## 2017-05-23 RX ADMIN — ATORVASTATIN CALCIUM 20 MILLIGRAM(S): 80 TABLET, FILM COATED ORAL at 21:53

## 2017-05-23 RX ADMIN — Medication 650 MILLIGRAM(S): at 21:54

## 2017-05-23 RX ADMIN — Medication 50 MILLIGRAM(S): at 05:43

## 2017-05-23 RX ADMIN — Medication 1 TABLET(S): at 11:52

## 2017-05-23 RX ADMIN — Medication 325 MILLIGRAM(S): at 08:06

## 2017-05-23 RX ADMIN — CALCITRIOL 0.25 MICROGRAM(S): 0.5 CAPSULE ORAL at 11:52

## 2017-05-23 RX ADMIN — Medication 650 MILLIGRAM(S): at 14:24

## 2017-05-23 RX ADMIN — Medication 40 MILLIEQUIVALENT(S): at 11:52

## 2017-05-23 RX ADMIN — Medication 81 MILLIGRAM(S): at 11:52

## 2017-05-23 RX ADMIN — Medication 325 MILLIGRAM(S): at 11:52

## 2017-05-23 NOTE — PROGRESS NOTE ADULT - PROBLEM SELECTOR PROBLEM 4
Acute pain of right shoulder
Diastolic heart failure, unspecified heart failure chronicity

## 2017-05-23 NOTE — PROGRESS NOTE ADULT - ASSESSMENT
83 yr old male with CKD, hypertension, ventricular bigeminy, carotid disease, anemia, diabetes mellitus admitted with leg swelling. States that he had noted his leg swelling to be worse over the last few weeks and was unable to wear his shoe today. He went to see his nephrologist who advised him to come to the ED. He reports that he walks about 50 feet and feels tired and has to sit down. He has been suffering from right shoulder pain for which he took Ibuprofen, about 9-10 tablets a day, this was a few days ago. He denies chest pain. He was started on iron supplements. Denies abdominal pain. Reports had black stools this aM  PT OOB IN CHAIR FEEL  BETTER  PT SEEN EARLIER  TODAY  CKD RENAL FOLLOW UP  FOR AVF   POSSIBLE D/C IN AM

## 2017-05-23 NOTE — PROGRESS NOTE ADULT - PROBLEM SELECTOR PLAN 4
S/P INJECTION BY ORTHO.
S/P INJECTION BY ORTHO.  CLINICALLY IMPROVED
S/P INJECTION BY ORTHO.  CLINICALLY IMPROVED
Cardiac Evaluation underway,
WILL HAVE ORTHO EVAL

## 2017-05-23 NOTE — PROGRESS NOTE ADULT - ASSESSMENT
83 year old male with ESRD s/p AV fistula POD0  -+ bruit +thrill from fistula   - continue pain control  - continue medication as per primary team  - await maturation before use of AV fistula  - plan as per primary team

## 2017-05-24 ENCOUNTER — TRANSCRIPTION ENCOUNTER (OUTPATIENT)
Age: 82
End: 2017-05-24

## 2017-05-24 ENCOUNTER — RX RENEWAL (OUTPATIENT)
Age: 82
End: 2017-05-24

## 2017-05-24 VITALS
TEMPERATURE: 98 F | DIASTOLIC BLOOD PRESSURE: 74 MMHG | HEART RATE: 68 BPM | RESPIRATION RATE: 16 BRPM | SYSTOLIC BLOOD PRESSURE: 162 MMHG | OXYGEN SATURATION: 100 %

## 2017-05-24 DIAGNOSIS — E11.29 TYPE 2 DIABETES MELLITUS WITH OTHER DIABETIC KIDNEY COMPLICATION: ICD-10-CM

## 2017-05-24 PROCEDURE — 99239 HOSP IP/OBS DSCHRG MGMT >30: CPT

## 2017-05-24 PROCEDURE — 99233 SBSQ HOSP IP/OBS HIGH 50: CPT

## 2017-05-24 RX ORDER — ERGOCALCIFEROL 1.25 MG/1
50000 CAPSULE ORAL
Qty: 0 | Refills: 0 | Status: DISCONTINUED | OUTPATIENT
Start: 2017-05-24 | End: 2017-05-24

## 2017-05-24 RX ORDER — BENZOYL PEROXIDE MICRONIZED 5.8 %
65 TOWELETTE (EA) TOPICAL
Qty: 0 | Refills: 0 | COMMUNITY

## 2017-05-24 RX ORDER — POTASSIUM CHLORIDE 20 MEQ
2 PACKET (EA) ORAL
Qty: 0 | Refills: 0 | DISCHARGE
Start: 2017-05-24

## 2017-05-24 RX ORDER — CALCITRIOL 0.5 UG/1
1 CAPSULE ORAL
Qty: 0 | Refills: 0 | DISCHARGE
Start: 2017-05-24

## 2017-05-24 RX ORDER — HYDRALAZINE HCL 50 MG
1 TABLET ORAL
Qty: 0 | Refills: 0 | DISCHARGE
Start: 2017-05-24

## 2017-05-24 RX ADMIN — Medication 325 MILLIGRAM(S): at 11:20

## 2017-05-24 RX ADMIN — CALCITRIOL 0.25 MICROGRAM(S): 0.5 CAPSULE ORAL at 11:23

## 2017-05-24 RX ADMIN — Medication 60 MILLIGRAM(S): at 06:24

## 2017-05-24 RX ADMIN — Medication 50 MILLIGRAM(S): at 06:24

## 2017-05-24 RX ADMIN — Medication 81 MILLIGRAM(S): at 11:24

## 2017-05-24 RX ADMIN — Medication 100 MILLIGRAM(S): at 11:23

## 2017-05-24 RX ADMIN — Medication 20 MILLIGRAM(S): at 11:23

## 2017-05-24 RX ADMIN — Medication 650 MILLIGRAM(S): at 06:24

## 2017-05-24 RX ADMIN — Medication 40 MILLIEQUIVALENT(S): at 11:24

## 2017-05-24 RX ADMIN — ERGOCALCIFEROL 50000 UNIT(S): 1.25 CAPSULE ORAL at 11:23

## 2017-05-24 RX ADMIN — Medication 1 TABLET(S): at 11:24

## 2017-05-24 NOTE — PROGRESS NOTE ADULT - SUBJECTIVE AND OBJECTIVE BOX
Renal :    For AVF in AM,    Lost ~ 17 lb., since admission, No RICHARDS,    Discussed w. Family,    Will plan  Discharge on Wednesday, if cleared by vascular surgery,
Renal :    See Office Note,    Labs - P :    Discussed w. Dr. Thomas,    Full consult to Follow,      Rec: PC Transfusion PRN,    IV Diuresis,    Steroid Injection - R Shoulder,    Cardiology Evaluation,    AVF,    HD in 8-10 weeks,
Renal :    Today's Progress Reviewed,    Discussed w. Dr. Bryan @ Length,    P :     1.CardioMems ( To Titrate Diuretics as OP )  2. Primary AVF  3.Optimize Volume status  4.Goal Hgb 10-11.5 gms.,  5.Control DM ,HTN,   6.Enrolled in [employer] [facility] ( A Nephrology Division CMS - Sponsored Research  Project - to facilitate Patient/Family Education, Optimize CKD-4 , Diet / Med review & Transition to HD, when indicated,  as OP )    Discussed w. the daughter,    IV  Lasix  Infusion D.C & Started on oral diuretics,    S/P  Steroid Injection - R Shoulder,    CKD - 4 Optimized,
CHRISTIANO ELIZABETH is a 83y Male with HPI:  83 yr old male with CKD, hypertension, ventricular bigeminy, carotid disease, anemia, diabetes mellitus admitted with leg swelling. States that he had noted his leg swelling to be worse over the last few weeks and was unable to wear his shoe today. He went to see his nephrologist who advised him to come to the ED. He reports that he walks about 50 feet and feels tired and has to sit down. He has been suffering from right shoulder pain for which he took Ibuprofen, about 9-10 tablets a day, this was a few days ago. He denies chest pain. He was started on iron supplements. Denies abdominal pain.    PT SEEN EARLIER TODAY  PT  FEELS BETTER    OOB TO CHAIR   NON TOXIC     PMD: Irvin  Nephro: Romana  Cardio: [place]  GI: Maya (19 May 2017 19:17)        Allergies:  Toprol-XL (Rash)      Medications:  acetaminophen   Tablet 650milliGRAM(s) Oral every 6 hours PRN  acetaminophen   Tablet. 650milliGRAM(s) Oral every 6 hours PRN  aspirin  chewable 81milliGRAM(s) Oral daily  sodium bicarbonate 650milliGRAM(s) Oral four times a day  flecainide 100milliGRAM(s) Oral every 12 hours  atorvastatin 20milliGRAM(s) Oral at bedtime  multivitamin 1Tablet(s) Oral daily  insulin lispro (HumaLOG) corrective regimen sliding scale  SubCutaneous three times a day before meals  dextrose 5%. 1000milliLiter(s) IV Continuous <Continuous>  dextrose Gel 1Dose(s) Oral once PRN  dextrose 50% Injectable 12.5Gram(s) IV Push once  dextrose 50% Injectable 25Gram(s) IV Push once  dextrose 50% Injectable 25Gram(s) IV Push once  glucagon  Injectable 1milliGRAM(s) IntraMuscular once PRN  hydrALAZINE 25milliGRAM(s) Oral every 8 hours  furosemide Infusion 10mG/Hr IV Continuous <Continuous>  epoetin juan Injectable 16739Doul(s) SubCutaneous <User Schedule>  ferrous    sulfate 325milliGRAM(s) Oral three times a day with meals  potassium chloride    Tablet ER 40milliEquivalent(s) Oral daily      ANTIBIOTICS: NONE        Review of Systems: - Negative except as mentioned above     Physical Exam:  ICU Vital Signs Last 24 Hrs  T(C): 36.7, Max: 37.1 ( @ 21:52)  T(F): 98, Max: 98.7 ( @ 21:52)  HR: 54 (54 - 56)  BP: 134/68 (134/68 - 189/87)  BP(mean): --  ABP: --  ABP(mean): --  RR: 14 (12 - 24)  SpO2: 100% (97% - 100%)    GEN: NAD, OOB TO CHAIR  HEENT: normocephalic and atraumatic. EOMI. DAISY...  NECK: Supple. No carotid bruits.  No lymphadenopathy or thyromegaly.  LUNGS: Clear to auscultation.  HEART: Regular rate and rhythm without murmur.  ABDOMEN: Soft, nontender, and nondistended.  Positive bowel sounds.  No hepatosplenomegaly was noted.  NO REBOUND NO GUARDING  EXTREMITIES: RIGTH SHOULDER TENDERNESS  NEUROLOGIC: Cranial nerves II through XII are grossly intact.    SKIN: No ulceration or induration present.      Labs:      145  |  106  |  75.0<H>  ----------------------------<  111  3.8   |  20.0<L>  |  3.10<H>    Ca    9.2      20 May 2017 05:25  Phos  5.3       Mg     2.2         TPro  7.2  /  Alb  4.2  /  TBili  0.2<L>  /  DBili  x   /  AST  26  /  ALT  37  /  AlkPhos  132<H>                            10.1   8.8   )-----------( 180      ( 20 May 2017 05:25 )             30.5       PT/INR - ( 20 May 2017 05:25 )   PT: 12.4 sec;   INR: 1.12 ratio         PTT - ( 19 May 2017 17:49 )  PTT:36.3 sec  Urinalysis Basic - ( 19 May 2017 17:50 )    Color: Yellow / Appearance: Clear / S.010 / pH: x  Gluc: x / Ketone: Negative  / Bili: Negative / Urobili: Negative mg/dL   Blood: x / Protein: 100 mg/dL / Nitrite: Negative   Leuk Esterase: Negative / RBC: x / WBC 0-2   Sq Epi: x / Non Sq Epi: x / Bacteria: x      LIVER FUNCTIONS - ( 19 May 2017 17:49 )  Alb: 4.2 g/dL / Pro: 7.2 g/dL / ALK PHOS: 132 U/L / ALT: 37 U/L / AST: 26 U/L / GGT: x           CARDIAC MARKERS ( 19 May 2017 17:49 )  x     / 0.01 ng/mL / x     / x     / x          CAPILLARY BLOOD GLUCOSE  104 (19 May 2017 21:52)
CHRISTIANO ELIZABETH is a 83y Male with HPI:  83 yr old male with CKD, hypertension, ventricular bigeminy, carotid disease, anemia, diabetes mellitus admitted with leg swelling. States that he had noted his leg swelling to be worse over the last few weeks and was unable to wear his shoe today. He went to see his nephrologist who advised him to come to the ED. He reports that he walks about 50 feet and feels tired and has to sit down. He has been suffering from right shoulder pain for which he took Ibuprofen, about 9-10 tablets a day, this was a few days ago. He denies chest pain. He was started on iron supplements. Denies abdominal pain. Reports had black stools this am.   PT  FEELS BETTER    OOB TO CHAIR   NON TOXIC     PMD: Irvin  Nephro: Romana  Cardio: [place]  GI: Maya (19 May 2017 19:17)        Allergies:  Toprol-XL (Rash)      Medications:  acetaminophen   Tablet 650milliGRAM(s) Oral every 6 hours PRN  acetaminophen   Tablet. 650milliGRAM(s) Oral every 6 hours PRN  aspirin  chewable 81milliGRAM(s) Oral daily  sodium bicarbonate 650milliGRAM(s) Oral four times a day  flecainide 100milliGRAM(s) Oral every 12 hours  atorvastatin 20milliGRAM(s) Oral at bedtime  multivitamin 1Tablet(s) Oral daily  insulin lispro (HumaLOG) corrective regimen sliding scale  SubCutaneous three times a day before meals  dextrose 5%. 1000milliLiter(s) IV Continuous <Continuous>  dextrose Gel 1Dose(s) Oral once PRN  dextrose 50% Injectable 12.5Gram(s) IV Push once  dextrose 50% Injectable 25Gram(s) IV Push once  dextrose 50% Injectable 25Gram(s) IV Push once  glucagon  Injectable 1milliGRAM(s) IntraMuscular once PRN  hydrALAZINE 25milliGRAM(s) Oral every 8 hours  furosemide Infusion 10mG/Hr IV Continuous <Continuous>  epoetin juan Injectable 24616Agig(s) SubCutaneous <User Schedule>  ferrous    sulfate 325milliGRAM(s) Oral three times a day with meals  potassium chloride    Tablet ER 40milliEquivalent(s) Oral daily      ANTIBIOTICS: NONE        Review of Systems: - Negative except as mentioned above     Physical Exam:  ICU Vital Signs Last 24 Hrs  T(C): 36.7, Max: 37.1 ( @ 21:52)  T(F): 98, Max: 98.7 ( @ 21:52)  HR: 54 (54 - 56)  BP: 134/68 (134/68 - 189/87)  BP(mean): --  ABP: --  ABP(mean): --  RR: 14 (12 - 24)  SpO2: 100% (97% - 100%)    GEN: NAD, OOB TO CHAIR  HEENT: normocephalic and atraumatic. EOMI. DAISY...  NECK: Supple. No carotid bruits.  No lymphadenopathy or thyromegaly.  LUNGS: Clear to auscultation.  HEART: Regular rate and rhythm without murmur.  ABDOMEN: Soft, nontender, and nondistended.  Positive bowel sounds.  No hepatosplenomegaly was noted.  NO REBOUND NO GUARDING  EXTREMITIES: RIGTH SHOULDER TENDERNESS  NEUROLOGIC: Cranial nerves II through XII are grossly intact.    SKIN: No ulceration or induration present.      Labs:      145  |  106  |  75.0<H>  ----------------------------<  111  3.8   |  20.0<L>  |  3.10<H>    Ca    9.2      20 May 2017 05:25  Phos  5.3       Mg     2.2         TPro  7.2  /  Alb  4.2  /  TBili  0.2<L>  /  DBili  x   /  AST  26  /  ALT  37  /  AlkPhos  132<H>                            10.1   8.8   )-----------( 180      ( 20 May 2017 05:25 )             30.5       PT/INR - ( 20 May 2017 05:25 )   PT: 12.4 sec;   INR: 1.12 ratio         PTT - ( 19 May 2017 17:49 )  PTT:36.3 sec  Urinalysis Basic - ( 19 May 2017 17:50 )    Color: Yellow / Appearance: Clear / S.010 / pH: x  Gluc: x / Ketone: Negative  / Bili: Negative / Urobili: Negative mg/dL   Blood: x / Protein: 100 mg/dL / Nitrite: Negative   Leuk Esterase: Negative / RBC: x / WBC 0-2   Sq Epi: x / Non Sq Epi: x / Bacteria: x      LIVER FUNCTIONS - ( 19 May 2017 17:49 )  Alb: 4.2 g/dL / Pro: 7.2 g/dL / ALK PHOS: 132 U/L / ALT: 37 U/L / AST: 26 U/L / GGT: x           CARDIAC MARKERS ( 19 May 2017 17:49 )  x     / 0.01 ng/mL / x     / x     / x          CAPILLARY BLOOD GLUCOSE  104 (19 May 2017 21:52)
CHRISTIANO ELIZABETH is a 83y Male with HPI:  83 yr old male with CKD, hypertension, ventricular bigeminy, carotid disease, anemia, diabetes mellitus admitted with leg swelling. States that he had noted his leg swelling to be worse over the last few weeks and was unable to wear his shoe today. He went to see his nephrologist who advised him to come to the ED. He reports that he walks about 50 feet and feels tired and has to sit down. He has been suffering from right shoulder pain for which he took Ibuprofen, about 9-10 tablets a day, this was a few days ago. He denies chest pain. He was started on iron supplements. Denies abdominal pain. Reports had black stools this am.   PT ADMITTED LAST PM FEELS BETTER    OOB TO CHAIR   NON TOXIC     PMD: Irvin  Nephro: Romana  Cardio: [place]  GI: Maya (19 May 2017 19:17)        Allergies:  Toprol-XL (Rash)      Medications:  acetaminophen   Tablet 650milliGRAM(s) Oral every 6 hours PRN  acetaminophen   Tablet. 650milliGRAM(s) Oral every 6 hours PRN  aspirin  chewable 81milliGRAM(s) Oral daily  sodium bicarbonate 650milliGRAM(s) Oral four times a day  flecainide 100milliGRAM(s) Oral every 12 hours  atorvastatin 20milliGRAM(s) Oral at bedtime  multivitamin 1Tablet(s) Oral daily  insulin lispro (HumaLOG) corrective regimen sliding scale  SubCutaneous three times a day before meals  dextrose 5%. 1000milliLiter(s) IV Continuous <Continuous>  dextrose Gel 1Dose(s) Oral once PRN  dextrose 50% Injectable 12.5Gram(s) IV Push once  dextrose 50% Injectable 25Gram(s) IV Push once  dextrose 50% Injectable 25Gram(s) IV Push once  glucagon  Injectable 1milliGRAM(s) IntraMuscular once PRN  hydrALAZINE 25milliGRAM(s) Oral every 8 hours  furosemide Infusion 10mG/Hr IV Continuous <Continuous>  epoetin juan Injectable 72880Ppbz(s) SubCutaneous <User Schedule>  ferrous    sulfate 325milliGRAM(s) Oral three times a day with meals  potassium chloride    Tablet ER 40milliEquivalent(s) Oral daily      ANTIBIOTICS: NONE        Review of Systems: - Negative except as mentioned above     Physical Exam:  ICU Vital Signs Last 24 Hrs  T(C): 36.7, Max: 37.1 ( @ 21:52)  T(F): 98, Max: 98.7 ( @ 21:52)  HR: 54 (54 - 56)  BP: 134/68 (134/68 - 189/87)  BP(mean): --  ABP: --  ABP(mean): --  RR: 14 (12 - 24)  SpO2: 100% (97% - 100%)    GEN: NAD, OOB TO CHAIR  HEENT: normocephalic and atraumatic. EOMI. DAISY...  NECK: Supple. No carotid bruits.  No lymphadenopathy or thyromegaly.  LUNGS: Clear to auscultation.  HEART: Regular rate and rhythm without murmur.  ABDOMEN: Soft, nontender, and nondistended.  Positive bowel sounds.  No hepatosplenomegaly was noted.  NO REBOUND NO GUARDING  EXTREMITIES: RIGTH SHOULDER TENDERNESS  NEUROLOGIC: Cranial nerves II through XII are grossly intact.    SKIN: No ulceration or induration present.      Labs:      145  |  106  |  75.0<H>  ----------------------------<  111  3.8   |  20.0<L>  |  3.10<H>    Ca    9.2      20 May 2017 05:25  Phos  5.3       Mg     2.2         TPro  7.2  /  Alb  4.2  /  TBili  0.2<L>  /  DBili  x   /  AST  26  /  ALT  37  /  AlkPhos  132<H>                            10.1   8.8   )-----------( 180      ( 20 May 2017 05:25 )             30.5       PT/INR - ( 20 May 2017 05:25 )   PT: 12.4 sec;   INR: 1.12 ratio         PTT - ( 19 May 2017 17:49 )  PTT:36.3 sec  Urinalysis Basic - ( 19 May 2017 17:50 )    Color: Yellow / Appearance: Clear / S.010 / pH: x  Gluc: x / Ketone: Negative  / Bili: Negative / Urobili: Negative mg/dL   Blood: x / Protein: 100 mg/dL / Nitrite: Negative   Leuk Esterase: Negative / RBC: x / WBC 0-2   Sq Epi: x / Non Sq Epi: x / Bacteria: x      LIVER FUNCTIONS - ( 19 May 2017 17:49 )  Alb: 4.2 g/dL / Pro: 7.2 g/dL / ALK PHOS: 132 U/L / ALT: 37 U/L / AST: 26 U/L / GGT: x           CARDIAC MARKERS ( 19 May 2017 17:49 )  x     / 0.01 ng/mL / x     / x     / x          CAPILLARY BLOOD GLUCOSE  104 (19 May 2017 21:52)
CHRISTIANO ELIZABETH is a 83y Male with HPI:  83 yr old male with CKD, hypertension, ventricular bigeminy, carotid disease, anemia, diabetes mellitus admitted with leg swelling. States that he had noted his leg swelling to be worse over the last few weeks and was unable to wear his shoe today. He went to see his nephrologist who advised him to come to the ED. He reports that he walks about 50 feet and feels tired and has to sit down. He has been suffering from right shoulder pain for which he took Ibuprofen, about 9-10 tablets a day, this was a few days ago. He denies chest pain. He was started on iron supplements. Denies abdominal pain. Reports had black stools this am.   PT ADMITTED LAST PM FEELS BETTER ON LASIX RIGHT SHOULDER PAIN    PMD: Irvin  Nephro: Romana  Cardio: [place]  GI: Maya (19 May 2017 19:17)        Allergies:  Toprol-XL (Rash)      Medications:  acetaminophen   Tablet 650milliGRAM(s) Oral every 6 hours PRN  acetaminophen   Tablet. 650milliGRAM(s) Oral every 6 hours PRN  aspirin  chewable 81milliGRAM(s) Oral daily  sodium bicarbonate 650milliGRAM(s) Oral four times a day  flecainide 100milliGRAM(s) Oral every 12 hours  atorvastatin 20milliGRAM(s) Oral at bedtime  multivitamin 1Tablet(s) Oral daily  insulin lispro (HumaLOG) corrective regimen sliding scale  SubCutaneous three times a day before meals  dextrose 5%. 1000milliLiter(s) IV Continuous <Continuous>  dextrose Gel 1Dose(s) Oral once PRN  dextrose 50% Injectable 12.5Gram(s) IV Push once  dextrose 50% Injectable 25Gram(s) IV Push once  dextrose 50% Injectable 25Gram(s) IV Push once  glucagon  Injectable 1milliGRAM(s) IntraMuscular once PRN  hydrALAZINE 25milliGRAM(s) Oral every 8 hours  furosemide Infusion 10mG/Hr IV Continuous <Continuous>  epoetin juan Injectable 40916Feto(s) SubCutaneous <User Schedule>  ferrous    sulfate 325milliGRAM(s) Oral three times a day with meals  potassium chloride    Tablet ER 40milliEquivalent(s) Oral daily      ANTIBIOTICS: NONE        Review of Systems: - Negative except as mentioned above     Physical Exam:  ICU Vital Signs Last 24 Hrs  T(C): 36.7, Max: 37.1 ( @ 21:52)  T(F): 98, Max: 98.7 ( @ 21:52)  HR: 54 (54 - 56)  BP: 134/68 (134/68 - 189/87)  BP(mean): --  ABP: --  ABP(mean): --  RR: 14 (12 - 24)  SpO2: 100% (97% - 100%)    GEN: NAD, OOB TO CHAIR  HEENT: normocephalic and atraumatic. EOMI. DAISY...  NECK: Supple. No carotid bruits.  No lymphadenopathy or thyromegaly.  LUNGS: Clear to auscultation.  HEART: Regular rate and rhythm without murmur.  ABDOMEN: Soft, nontender, and nondistended.  Positive bowel sounds.  No hepatosplenomegaly was noted.  NO REBOUND NO GUARDING  EXTREMITIES: RIGTH SHOULDER TENDERNESS  NEUROLOGIC: Cranial nerves II through XII are grossly intact.    SKIN: No ulceration or induration present.      Labs:      145  |  106  |  75.0<H>  ----------------------------<  111  3.8   |  20.0<L>  |  3.10<H>    Ca    9.2      20 May 2017 05:25  Phos  5.3       Mg     2.2         TPro  7.2  /  Alb  4.2  /  TBili  0.2<L>  /  DBili  x   /  AST  26  /  ALT  37  /  AlkPhos  132<H>                            10.1   8.8   )-----------( 180      ( 20 May 2017 05:25 )             30.5       PT/INR - ( 20 May 2017 05:25 )   PT: 12.4 sec;   INR: 1.12 ratio         PTT - ( 19 May 2017 17:49 )  PTT:36.3 sec  Urinalysis Basic - ( 19 May 2017 17:50 )    Color: Yellow / Appearance: Clear / S.010 / pH: x  Gluc: x / Ketone: Negative  / Bili: Negative / Urobili: Negative mg/dL   Blood: x / Protein: 100 mg/dL / Nitrite: Negative   Leuk Esterase: Negative / RBC: x / WBC 0-2   Sq Epi: x / Non Sq Epi: x / Bacteria: x      LIVER FUNCTIONS - ( 19 May 2017 17:49 )  Alb: 4.2 g/dL / Pro: 7.2 g/dL / ALK PHOS: 132 U/L / ALT: 37 U/L / AST: 26 U/L / GGT: x           CARDIAC MARKERS ( 19 May 2017 17:49 )  x     / 0.01 ng/mL / x     / x     / x          CAPILLARY BLOOD GLUCOSE  104 (19 May 2017 21:52)
[place] [facility] - [facility]                                   [address] [place] [address]                                                      PHONE: [phone]                                                         FAX: [phone]  [email or web address]  ---------------------------------------------------------------------------------------------------------------------------------    Overnight events/patient complaints:  pt edema improved s/p AV shunt    Toprol-XL (Rash)    MEDICATIONS  (STANDING):  aspirin  chewable 81milliGRAM(s) Oral daily  flecainide 100milliGRAM(s) Oral every 12 hours  atorvastatin 20milliGRAM(s) Oral at bedtime  insulin lispro (HumaLOG) corrective regimen sliding scale  SubCutaneous three times a day before meals  dextrose 5%. 1000milliLiter(s) IV Continuous <Continuous>  dextrose 50% Injectable 12.5Gram(s) IV Push once  dextrose 50% Injectable 25Gram(s) IV Push once  dextrose 50% Injectable 25Gram(s) IV Push once  epoetin juan Injectable 90389Srgw(s) SubCutaneous <User Schedule>  ferrous    sulfate 325milliGRAM(s) Oral three times a day with meals  potassium chloride    Tablet ER 40milliEquivalent(s) Oral daily  iron sucrose IVPB 100milliGRAM(s) IV Intermittent daily  NIFEdipine XL 60milliGRAM(s) Oral daily  torsemide 20milliGRAM(s) Oral daily  Nephro-pito 1Tablet(s) Oral daily  hydrALAZINE 50milliGRAM(s) Oral every 12 hours  sodium bicarbonate 650milliGRAM(s) Oral three times a day  calcitriol   Capsule 0.25MICROGram(s) Oral daily    MEDICATIONS  (PRN):  acetaminophen   Tablet 650milliGRAM(s) Oral every 6 hours PRN For Temp greater than 38 C (100.4 F)  acetaminophen   Tablet. 650milliGRAM(s) Oral every 6 hours PRN Moderate Pain (4 - 6)  dextrose Gel 1Dose(s) Oral once PRN Blood Glucose LESS THAN 70 milliGRAM(s)/deciliter  glucagon  Injectable 1milliGRAM(s) IntraMuscular once PRN Glucose LESS THAN 70 milligrams/deciliter      Vital Signs Last 24 Hrs  T(C): 36.6, Max: 36.8 (05-21 @ 15:18)  T(F): 97.9, Max: 98.2 (05-21 @ 15:18)  HR: 52 (52 - 63)  BP: 148/54 (130/52 - 176/54)  BP(mean): --  RR: 16 (16 - 18)  SpO2: 97% (96% - 98%)  ICU Vital Signs Last 24 Hrs  CHRISTIANO ELIZABETH  I&O's Detail    I & Os for current day (as of 22 May 2017 10:42)  =============================================  IN:    Oral Fluid: 1320 ml    Total IN: 1320 ml  ---------------------------------------------  OUT:    Voided: 1650 ml    Total OUT: 1650 ml  ---------------------------------------------  Total NET: -330 ml    Drug Dosing Weight  CHRISTIANO ELIZABETH      PHYSICAL EXAM:  General: Appears well developed, well nourished alert and cooperative.  HEENT: Head; normocephalic, atraumatic.  Eyes: Pupils reactive, cornea wnl.  Neck: Supple, no nodes adenopathy, no NVD or carotid bruit or thyromegaly.  CARDIOVASCULAR: Normal S1 and S2, No murmur, rub, gallop or lift.   LUNGS: No rales, rhonchi or wheeze. Normal breath sounds bilaterally.  ABDOMEN: Soft, nontender without mass or organomegaly. bowel sounds normoactive.  EXTREMITIES: No clubbing, cyanosis or edema. Distal pulses wnl.   SKIN: warm and dry with normal turgor.  NEURO: Alert/oriented x 3/normal motor exam. No pathologic reflexes.    PSYCH: normal affect.        LABS:                        10.2   9.4   )-----------( 186      ( 22 May 2017 05:37 )             30.7     05-22    143  |  104  |  89.0<H>  ----------------------------<  123<H>  4.3   |  21.0<L>  |  3.81<H>    Ca    9.0      22 May 2017 05:37  Phos  4.5     05-22    TPro  x   /  Alb  3.7  /  TBili  x   /  DBili  x   /  AST  x   /  ALT  x   /  AlkPhos  x   05-22    CHRISTIANO ELIZABETH            RADIOLOGY & ADDITIONAL STUDIES:    INTERPRETATION OF TELEMETRY (personally reviewed): Sinus [place]         ECHO:  Summary:   1. Left ventricular ejection fraction, by visual estimation, is 55 to   60%.   2. Normal global left ventricular systolic function.  3. Spectral Doppler shows impaired relaxation pattern of left   ventricular myocardial filling (Grade I diastolic dysfunction).   4. There is mild septal left ventricular hypertrophy.   5. Thickening of the anterior and posterior mitral valve leaflets.   6. Mild-moderate tricuspid regurgitation.   7. Sclerotic aortic valve with normal opening.   8. Estimated pulmonary artery systolic pressure is 43.5 mmHg assuming a   right atrial pressure of 8 mmHg, which is consistent with mild pulmonary   hypertension.   9. Mildly enlarged left atrium.     MD Lane Electronically signed on 5/21/2017 at 9:42:48 AM        CARDIAC CATHETERIZATION: CORONARY VESSELS: The coronary circulation is right dominant.  LM:   --  LM: Normal.  LAD:   --  Proximal LAD: There was a 40 % stenosis.  --  Mid LAD: There was a 30 % stenosis.  --  Distal LAD: Angiography showed minor luminal irregularities withno  flow limiting lesions.  CX:   --  Mid circumflex: There was a 50 % stenosis.  --  OM1: There was a 30 % stenosis at the ostium of the vessel segment.  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  --  Mid RCA: There was a 70 % stenosis.  COMPLICATIONS: There were no complications. No complications occurred  during the cath lab visit.  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  S/P AV shunt        continue preset cardiac therapy    will FUP in office
INTERVAL HPI/OVERNIGHT EVENTS: 83 year old male with ESRD s/p AV fistula POD0. Patient seen and examined, offering no complaints at this time. +bruit ausculateted +thrill palpated. 2+radial and 2+ brachial pulses. Incision site is in tact and dry. Denies chest pain, SOB/ palpitations, nausea, vomiting, headache dizziness.    STATUS POST:  AV fistila    POST OPERATIVE DAY #: 0      MEDICATIONS  (STANDING):  aspirin  chewable 81milliGRAM(s) Oral daily  flecainide 100milliGRAM(s) Oral every 12 hours  atorvastatin 20milliGRAM(s) Oral at bedtime  insulin lispro (HumaLOG) corrective regimen sliding scale  SubCutaneous three times a day before meals  dextrose 5%. 1000milliLiter(s) IV Continuous <Continuous>  dextrose 50% Injectable 12.5Gram(s) IV Push once  dextrose 50% Injectable 25Gram(s) IV Push once  dextrose 50% Injectable 25Gram(s) IV Push once  epoetin juan Injectable 64408Wais(s) SubCutaneous <User Schedule>  ferrous    sulfate 325milliGRAM(s) Oral three times a day with meals  potassium chloride    Tablet ER 40milliEquivalent(s) Oral daily  iron sucrose IVPB 100milliGRAM(s) IV Intermittent daily  NIFEdipine XL 60milliGRAM(s) Oral daily  Nephro-pito 1Tablet(s) Oral daily  hydrALAZINE 50milliGRAM(s) Oral every 12 hours  sodium bicarbonate 650milliGRAM(s) Oral three times a day  calcitriol   Capsule 0.25MICROGram(s) Oral daily  dextrose 5% + sodium chloride 0.9%. 1000milliLiter(s) IV Continuous <Continuous>    MEDICATIONS  (PRN):  acetaminophen   Tablet 650milliGRAM(s) Oral every 6 hours PRN For Temp greater than 38 C (100.4 F)  acetaminophen   Tablet. 650milliGRAM(s) Oral every 6 hours PRN Moderate Pain (4 - 6)  dextrose Gel 1Dose(s) Oral once PRN Blood Glucose LESS THAN 70 milliGRAM(s)/deciliter  glucagon  Injectable 1milliGRAM(s) IntraMuscular once PRN Glucose LESS THAN 70 milligrams/deciliter      Vital Signs Last 24 Hrs  T(C): 37.3, Max: 37.3 (05-23 @ 19:15)  T(F): 99.1, Max: 99.1 (05-23 @ 19:15)  HR: 80 (16 - 80)  BP: 158/70 (148/62 - 198/74)  BP(mean): --  RR: 16 (12 - 17)  SpO2: 100% (96% - 100%)    PHYSICAL EXAM:      Constitutional: NAD    Respiratory: CTA b/l    Cardiovascular: RRR    Gastrointestinal: soft non distended non tender    Skin: incision site clean dry and in tact    Vascular: LEFT arm +thrill, +bruit, +radial and + brachial pulses          I&O's Detail  I & Os for 24h ending 23 May 2017 07:00  =============================================  IN:    Oral Fluid: 890 ml    Total IN: 890 ml  ---------------------------------------------  OUT:    Voided: 2425 ml    Total OUT: 2425 ml  ---------------------------------------------  Total NET: -1535 ml    I & Os for current day (as of 23 May 2017 23:21)  =============================================  IN:    sodium chloride 0.9%: 200 ml    Oral Fluid: 120 ml    Total IN: 320 ml  ---------------------------------------------  OUT:    Voided: 675 ml    Total OUT: 675 ml  ---------------------------------------------  Total NET: -355 ml      LABS:                        9.7    8.5   )-----------( 197      ( 23 May 2017 06:27 )             30.0     05-23    141  |  106  |  91.0<H>  ----------------------------<  117<H>  4.8   |  22.0  |  3.72<H>    Ca    8.7      23 May 2017 06:27  Phos  4.4     05-23  Mg     2.4     05-23    TPro  6.5<L>  /  Alb  3.5  /  TBili  0.3<L>  /  DBili  x   /  AST  16  /  ALT  25  /  AlkPhos  119  05-23    PT/INR - ( 23 May 2017 06:27 )   PT: 11.8 sec;   INR: 1.07 ratio         PTT - ( 23 May 2017 06:27 )  PTT:34.3 sec      RADIOLOGY & ADDITIONAL STUDIES:
[facility]                                   [address] [place] [address]                                                      PHONE: [phone]                                                         FAX: [phone]  [email or web address]  ---------------------------------------------------------------------------------------------------------------------------------    Overnight events/patient complaints:  pt edema improved    Toprol-XL (Rash)    MEDICATIONS  (STANDING):  aspirin  chewable 81milliGRAM(s) Oral daily  flecainide 100milliGRAM(s) Oral every 12 hours  atorvastatin 20milliGRAM(s) Oral at bedtime  insulin lispro (HumaLOG) corrective regimen sliding scale  SubCutaneous three times a day before meals  dextrose 5%. 1000milliLiter(s) IV Continuous <Continuous>  dextrose 50% Injectable 12.5Gram(s) IV Push once  dextrose 50% Injectable 25Gram(s) IV Push once  dextrose 50% Injectable 25Gram(s) IV Push once  epoetin juan Injectable [id number](s) SubCutaneous <User Schedule>  ferrous    sulfate 325milliGRAM(s) Oral three times a day with meals  potassium chloride    Tablet ER 40milliEquivalent(s) Oral daily  iron sucrose IVPB 100milliGRAM(s) IV Intermittent daily  NIFEdipine XL 60milliGRAM(s) Oral daily  torsemide 20milliGRAM(s) Oral daily  Nephro-pito 1Tablet(s) Oral daily  hydrALAZINE 50milliGRAM(s) Oral every 12 hours  sodium bicarbonate 650milliGRAM(s) Oral three times a day  calcitriol   Capsule 0.25MICROGram(s) Oral daily    MEDICATIONS  (PRN):  acetaminophen   Tablet 650milliGRAM(s) Oral every 6 hours PRN For Temp greater than 38 C (100.4 F)  acetaminophen   Tablet. 650milliGRAM(s) Oral every 6 hours PRN Moderate Pain (4 - 6)  dextrose Gel 1Dose(s) Oral once PRN Blood Glucose LESS THAN 70 milliGRAM(s)/deciliter  glucagon  Injectable 1milliGRAM(s) IntraMuscular once PRN Glucose LESS THAN 70 milligrams/deciliter      Vital Signs Last 24 Hrs  T(C): 36.6, Max: 36.8 (05-21 @ 15:18)  T(F): 97.9, Max: 98.2 (05-21 @ 15:18)  HR: 52 (52 - 63)  BP: 148/54 (130/52 - 176/54)  BP(mean): --  RR: 16 (16 - 18)  SpO2: 97% (96% - 98%)  ICU Vital Signs Last 24 Hrs  CHRISTIANO ELIZABETH  I&O's Detail    I & Os for current day (as of 22 May 2017 10:42)  =============================================  IN:    Oral Fluid: 1320 ml    Total IN: 1320 ml  ---------------------------------------------  OUT:    Voided: 1650 ml    Total OUT: 1650 ml  ---------------------------------------------  Total NET: -330 ml    Drug Dosing Weight  CHRISTIANO ELIZABETH      PHYSICAL EXAM:  General: Appears well developed, well nourished alert and cooperative.  HEENT: Head; normocephalic, atraumatic.  Eyes: Pupils reactive, cornea wnl.  Neck: Supple, no nodes adenopathy, no NVD or carotid bruit or thyromegaly.  CARDIOVASCULAR: Normal S1 and S2, No murmur, rub, gallop or lift.   LUNGS: No rales, rhonchi or wheeze. Normal breath sounds bilaterally.  ABDOMEN: Soft, nontender without mass or organomegaly. bowel sounds normoactive.  EXTREMITIES: No clubbing, cyanosis or edema. Distal pulses wnl.   SKIN: warm and dry with normal turgor.  NEURO: Alert/oriented x 3/normal motor exam. No pathologic reflexes.    PSYCH: normal affect.        LABS:                        10.2   9.4   )-----------( 186      ( 22 May 2017 05:37 )             30.7     05-22    143  |  104  |  89.0<H>  ----------------------------<  123<H>  4.3   |  21.0<L>  |  3.81<H>    Ca    9.0      22 May 2017 05:37  Phos  4.5     05-22    TPro  x   /  Alb  3.7  /  TBili  x   /  DBili  x   /  AST  x   /  ALT  x   /  AlkPhos  x   05-22    CHRISTIANO GLENN            RADIOLOGY & ADDITIONAL STUDIES:    INTERPRETATION OF TELEMETRY (personally reviewed): Sinus [place]         ECHO:  Summary:   1. Left ventricular ejection fraction, by visual estimation, is 55 to   60%.   2. Normal global left ventricular systolic function.  3. Spectral Doppler shows impaired relaxation pattern of left   ventricular myocardial filling (Grade I diastolic dysfunction).   4. There is mild septal left ventricular hypertrophy.   5. Thickening of the anterior and posterior mitral valve leaflets.   6. Mild-moderate tricuspid regurgitation.   7. Sclerotic aortic valve with normal opening.   8. Estimated pulmonary artery systolic pressure is 43.5 mmHg assuming a   right atrial pressure of 8 mmHg, which is consistent with mild pulmonary   hypertension.   9. Mildly enlarged left atrium.     MD Lane Electronically signed on 5/21/2017 at 9:42:48 AM        CARDIAC CATHETERIZATION: CORONARY VESSELS: The coronary circulation is right dominant.  LM:   --  LM: Normal.  LAD:   --  Proximal LAD: There was a 40 % stenosis.  --  Mid LAD: There was a 30 % stenosis.  --  Distal LAD: Angiography showed minor luminal irregularities withno  flow limiting lesions.  CX:   --  Mid circumflex: There was a 50 % stenosis.  --  OM1: There was a 30 % stenosis at the ostium of the vessel segment.  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  --  Mid RCA: There was a 70 % stenosis.  COMPLICATIONS: There were no complications. No complications occurred  during the cath lab visit.  DIAGNOSTIC IMPRESSIONS: Probably significant RCA disease with unusual take  off with non obstructive CAD of LCX and LAD    ASSESSMENT AND PLAN:  In summary, CHRISTIANO ELIZABETH is an 83y Male with past medical history significant for HTN, CKD, VT controlled on atenolol and lidocaine admitted after recent trip to [place] significant salt intake and several days of NSAID use likely cause of LE edema.    1) Appreciate renal input  2) Restart atenolol 12.5mg daily hold if HR <45, cw lidocaine  3) no cardiovascular contraindication to fistula placement, pt is elevated but acceptable risk for moderate risk procedure
Patient is a 83y old  Male who presents with a chief complaint of his ankles swelling so much  that he couldn't put his shoes on. (19 May 2017 19:17)  He is scheduled to have a LEFT ARM  AV FISTULA CREATION, POSSIBLE GRAFT.      PAST MEDICAL HISTORY:  RICHARDS (dyspnea on exertion)  VT (ventricular tachycardia)  HTN (hypertension)  CAD (coronary artery disease)  CKD (chronic kidney disease) - stage 4 kidney disease  Arrhythmia  AV block, 1st degree  DM (diabetes mellitus)  atrial fibrillation  hyperlipidemia    PAST SURGICAL HISTORY:  H/O angioplasty  H/O left knee surgery  H/O circumcision      MEDICATIONS  (STANDING):  aspirin  chewable 81milliGRAM(s) Oral daily  flecainide 100milliGRAM(s) Oral every 12 hours  atorvastatin 20milliGRAM(s) Oral at bedtime  insulin lispro (HumaLOG) corrective regimen sliding scale  SubCutaneous three times a day before meals  dextrose 5%. 1000milliLiter(s) IV Continuous <Continuous>  dextrose 50% Injectable 12.5Gram(s) IV Push once  dextrose 50% Injectable 25Gram(s) IV Push once  dextrose 50% Injectable 25Gram(s) IV Push once  epoetin juan Injectable [id number](s) SubCutaneous <User Schedule>  ferrous    sulfate 325milliGRAM(s) Oral three times a day with meals  potassium chloride    Tablet ER 40milliEquivalent(s) Oral daily  iron sucrose IVPB 100milliGRAM(s) IV Intermittent daily  NIFEdipine XL 60milliGRAM(s) Oral daily  Nephro-pito 1Tablet(s) Oral daily  hydrALAZINE 50milliGRAM(s) Oral every 12 hours  sodium bicarbonate 650milliGRAM(s) Oral three times a day  calcitriol   Capsule 0.25MICROGram(s) Oral daily    MEDICATIONS  (PRN):  acetaminophen   Tablet 650milliGRAM(s) Oral every 6 hours PRN For Temp greater than 38 C (100.4 F)  acetaminophen   Tablet. 650milliGRAM(s) Oral every 6 hours PRN Moderate Pain (4 - 6)  dextrose Gel 1Dose(s) Oral once PRN Blood Glucose LESS THAN 70 milliGRAM(s)/deciliter  glucagon  Injectable 1milliGRAM(s) IntraMuscular once PRN Glucose LESS THAN 70 milligrams/deciliter      Allergies:  Toprol-XL (Rash)      SOCIAL HISTORY:  The patient does not drink alcohol, smoke or use illicit drugs.                        10.2   9.4   )-----------( 186      ( 22 May 2017 05:37 )             30.7       PT/INR - ( 20 May 2017 05:25 )   PT: 12.4 sec;   INR: 1.12 ratio         PTT - ( 19 May 2017 17:49 )  PTT:36.3 sec        143  |  104  |  89.0<H>  ----------------------------<  123<H>  4.3   |  21.0<L>  |  3.81<H>    Ca    9.0      22 May 2017 05:37  Phos  4.5         TPro  x   /  Alb  3.7  /  TBili  x   /  DBili  x   /  AST  x   /  ALT  x   /  AlkPhos  x       [id number]  Sinus bradycardia with sinus arrhythmia with 1st degree A-V block  Otherwise normal ECG     TT ECHO:  ECHO TRANSTHORACIC    -   May 20 2017      Summary:   1. Left ventricular ejection fraction, by visual estimation, is 55 to        60%.   2. Normal global left ventricular systolic function.  3. Spectral Doppler shows impaired relaxation pattern of left         ventricular myocardial filling (Grade I diastolic dysfunction).   4. There is mild septal left ventricular hypertrophy.   5. Thickening of the anterior and posterior mitral valve leaflets.   6. Mild-moderate tricuspid regurgitation.   7. Sclerotic aortic valve with normal opening.   8. Estimated pulmonary artery systolic pressure is 43.5 mmHg assuming a        right atrial pressure of 8 mmHg, which is consistent with mild pulmonary        hypertension.   9. Mildly enlarged left atrium.    CHEST SINGLE VIEW FRONTAL   -   2017    FINDINGS:   The lungs  are clear.  No pleural abnormality is seen.         ASA # =  3  Mallampati # =  1  (Top and bottom dentures.)
Patient is a 83y old  Male who presents with a chief complaint of leg swelling (24 May 2017 08:05)  Pt is S/P   L AVF      POD# 1  No complaints. Ready to go home    Vital Signs Last 24 Hrs  T(C): 36.7, Max: 37.3 (05-23 @ 19:15)  T(F): 98, Max: 99.1 (05-23 @ 19:15)  HR: 68 (16 - 80)  BP: 162/74 (149/63 - 180/68)  BP(mean): --  RR: 16 (12 - 16)  SpO2: 100% (96% - 100%)  I&O's Detail  I & Os for 24h ending 24 May 2017 07:00  =============================================  IN:    sodium chloride 0.9%: 200 ml    Oral Fluid: 120 ml    Total IN: 320 ml  ---------------------------------------------  OUT:    Voided: 1135 ml    Total OUT: 1135 ml  ---------------------------------------------  Total NET: -815 ml    I & Os for current day (as of 24 May 2017 12:52)  =============================================  IN:    Oral Fluid: 480 ml    Total IN: 480 ml  ---------------------------------------------  OUT:    Voided: 550 ml    Total OUT: 550 ml  ---------------------------------------------  Total NET: -70 ml    MEDICATIONS  (STANDING):  aspirin  chewable 81milliGRAM(s) Oral daily  flecainide 100milliGRAM(s) Oral every 12 hours  atorvastatin 20milliGRAM(s) Oral at bedtime  insulin lispro (HumaLOG) corrective regimen sliding scale  SubCutaneous three times a day before meals  dextrose 50% Injectable 12.5Gram(s) IV Push once  dextrose 50% Injectable 25Gram(s) IV Push once  dextrose 50% Injectable 25Gram(s) IV Push once  epoetin juan Injectable [id number](s) SubCutaneous <User Schedule>  ferrous    sulfate 325milliGRAM(s) Oral three times a day with meals  potassium chloride    Tablet ER 40milliEquivalent(s) Oral daily  iron sucrose IVPB 100milliGRAM(s) IV Intermittent daily  NIFEdipine XL 60milliGRAM(s) Oral daily  Nephro-pito 1Tablet(s) Oral daily  hydrALAZINE 50milliGRAM(s) Oral every 12 hours  sodium bicarbonate 650milliGRAM(s) Oral three times a day  calcitriol   Capsule 0.25MICROGram(s) Oral daily  torsemide 20milliGRAM(s) Oral daily  ergocalciferol [id number](s) Oral every week    MEDICATIONS  (PRN):  acetaminophen   Tablet 650milliGRAM(s) Oral every 6 hours PRN For Temp greater than 38 C (100.4 F)  acetaminophen   Tablet. 650milliGRAM(s) Oral every 6 hours PRN Moderate Pain (4 - 6)  dextrose Gel 1Dose(s) Oral once PRN Blood Glucose LESS THAN 70 milliGRAM(s)/deciliter  glucagon  Injectable 1milliGRAM(s) IntraMuscular once PRN Glucose LESS THAN 70 milligrams/deciliter    PAST MEDICAL & SURGICAL HISTORY:  RICHARDS (dyspnea on exertion)  VT (ventricular tachycardia)  HTN (hypertension)  CAD (coronary artery disease)  CKD (chronic kidney disease): stage IV  Arrhythmia  AV block, 1st degree  DM (diabetes mellitus)  H/O angioplasty: 2013,  no  intervention  H/O left knee surgery  H/O circumcision: at  age  65    Physical Exam:  General: NAD, out of bed in chair  Extremities: L AVF with steri's intact. Sm amt serosang-dried- on steris. No erythema or ecchymosis. +bruit, +thrill. L hand WWP, +radial, motor and sensory intact      LABS:                        9.7    8.5   )-----------( 197      ( 23 May 2017 06:27 )             30.0     05-23    141  |  106  |  91.0<H>  ----------------------------<  117<H>  4.8   |  22.0  |  3.72<H>    Ca    8.7      23 May 2017 06:27  Phos  4.4     05-23  Mg     2.4     05-23    TPro  6.5<L>  /  Alb  3.5  /  TBili  0.3<L>  /  DBili  x   /  AST  16  /  ALT  25  /  AlkPhos  119  05-23    PT/INR - ( 23 May 2017 06:27 )   PT: 11.8 sec;   INR: 1.07 ratio         PTT - ( 23 May 2017 06:27 )  PTT:34.3 sec  CAPILLARY BLOOD GLUCOSE  128 (24 May 2017 07:41)  123 (23 May 2017 21:33)  123 (23 May 2017 20:15)      Radiology and Additional Studies:    Assessment:83yMaleHPI:  83 yr old male with CKD, hypertension, ventricular bigeminy, carotid disease, anemia, diabetes mellitus admitted with leg swelling. States that he had noted his leg swelling to be worse over the last few weeks and was unable to wear his shoe today. He went to see his nephrologist who advised him to come to the ED. He reports that he walks about 50 feet and feels tired and has to sit down. He has been suffering from right shoulder pain for which he took Ibuprofen, about 9-10 tablets a day, this was a few days ago. He denies chest pain. He was started on iron supplements. Denies abdominal pain. Reports had black stools this am.     PMD: Irvin  Nephro: Romana  Cardio: [place]  GI: Maya (19 May 2017 19:17)   S/P L AVF    POD# 1    Plan:  May leave open to air  Wash with soap and water and pat dry  Follow up with Dr Barbara Sorenson in 2-3 weeks. [phone]  Seen and discussed with Dr. Barbara Sorenson
Pt Name: CHRISTIANO ELIZABETH    MRN: [id number]      Patient is a being followed for right shoulder bursitis and AC DJD. He received a subacromial corticosteroid injection yesterday. He reports of improved pain and increased ROM. No new complaints.       PAST MEDICAL & SURGICAL HISTORY:  PAST MEDICAL & SURGICAL HISTORY:  RICHARDS (dyspnea on exertion)  VT (ventricular tachycardia)  HTN (hypertension)  CAD (coronary artery disease)  CKD (chronic kidney disease): stage IV  Arrhythmia  AV block, 1st degree  DM (diabetes mellitus)  H/O angioplasty: ,  no  intervention  H/O left knee surgery  H/O circumcision: at  age  65      Allergies: Toprol-XL (Rash)      Medications: acetaminophen   Tablet 650milliGRAM(s) Oral every 6 hours PRN  acetaminophen   Tablet. 650milliGRAM(s) Oral every 6 hours PRN  aspirin  chewable 81milliGRAM(s) Oral daily  sodium bicarbonate 650milliGRAM(s) Oral four times a day  flecainide 100milliGRAM(s) Oral every 12 hours  atorvastatin 20milliGRAM(s) Oral at bedtime  multivitamin 1Tablet(s) Oral daily  insulin lispro (HumaLOG) corrective regimen sliding scale  SubCutaneous three times a day before meals  dextrose 5%. 1000milliLiter(s) IV Continuous <Continuous>  dextrose Gel 1Dose(s) Oral once PRN  dextrose 50% Injectable 12.5Gram(s) IV Push once  dextrose 50% Injectable 25Gram(s) IV Push once  dextrose 50% Injectable 25Gram(s) IV Push once  glucagon  Injectable 1milliGRAM(s) IntraMuscular once PRN  hydrALAZINE 25milliGRAM(s) Oral every 8 hours  epoetin juan Injectable [id number](s) SubCutaneous <User Schedule>  ferrous    sulfate 325milliGRAM(s) Oral three times a day with meals  potassium chloride    Tablet ER 40milliEquivalent(s) Oral daily  furosemide    Tablet 40milliGRAM(s) Oral daily  iron sucrose IVPB 100milliGRAM(s) IV Intermittent daily        Ambulation: Walking independently [x] With Cane [ ] With Walker [ ]  Bedbound [ ]                           10.1   8.8   )-----------( 180      ( 20 May 2017 05:25 )             30.5     05-21    x   |  x   |  x   ----------------------------<  x   4.1   |  x   |  x     Ca    9.2      20 May 2017 05:25  Phos  5.3     -  Mg     2.2     -    TPro  7.2  /  Alb  4.2  /  TBili  0.2<L>  /  DBili  x   /  AST  26  /  ALT  37  /  AlkPhos  132<H>        PHYSICAL EXAM:    Vital Signs Last 24 Hrs  T(C): 37, Max: 37 ( @ 05:23)  T(F): 98.6, Max: 98.6 ( @ 05:23)  HR: 43 (43 - 62)  BP: 182/55 (130/76 - 189/60)  BP(mean): --  RR: 16 (16 - 16)  SpO2: 98% (98% - 99%)  Daily     Daily Weight in k.9 (21 May 2017 05:59)    Appearance: Alert, responsive, in no acute distress.    Neurological: Sensation is grossly intact to light touch. 5/5 motor function of all extremities. No focal deficits or weaknesses found.    Skin: no rash on visible skin. Skin is clean, dry and intact. No bleeding. No abrasions. No ulcerations.    Vascular: 2+ distal pulses. Cap refill < 2 sec. No signs of venous insuffiencey or stasis. No extremity ulcerations. No cyanosis.    Musculoskeletal:         Right Upper Extremity: Improved passive and active ROM of the shoulder. No tenderness. No crepitus.           A/P:  Pt is a  83y Male with  right shoulder bursitis and AC DJD    PLAN:   * office follow-up in 1-2 weeks  * reconsult as needed.
Renal :    CHRISTIANO ELIZABETH is a 83y Male with     HPI:  83 yr old male with CKD, hypertension, ventricular bigeminy, carotid disease, anemia, diabetes mellitus admitted with leg swelling. States that he had noted his leg swelling to be worse over the last few weeks and was unable to wear his shoe today. He went to see his nephrologist who advised him to come to the ED. He reports that he walks about 50 feet and feels tired and has to sit down. He has been suffering from right shoulder pain for which he took Ibuprofen, about 9-10 tablets a day, this was a few days ago. He denies chest pain. He was started on iron supplements. Denies abdominal pain. Reports had black stools this am.       PMD: Irvin  Nephro: Romana  Cardio: [place]  GI: Maya (19 May 2017 19:17)        Allergies:  Toprol-XL (Rash)      Medications:  acetaminophen   Tablet 650milliGRAM(s) Oral every 6 hours PRN  acetaminophen   Tablet. 650milliGRAM(s) Oral every 6 hours PRN  aspirin  chewable 81milliGRAM(s) Oral daily  sodium bicarbonate 650milliGRAM(s) Oral four times a day  flecainide 100milliGRAM(s) Oral every 12 hours  atorvastatin 20milliGRAM(s) Oral at bedtime  multivitamin 1Tablet(s) Oral daily  insulin lispro (HumaLOG) corrective regimen sliding scale  SubCutaneous three times a day before meals  dextrose 5%. 1000milliLiter(s) IV Continuous <Continuous>  dextrose Gel 1Dose(s) Oral once PRN  dextrose 50% Injectable 12.5Gram(s) IV Push once  dextrose 50% Injectable 25Gram(s) IV Push once  dextrose 50% Injectable 25Gram(s) IV Push once  glucagon  Injectable 1milliGRAM(s) IntraMuscular once PRN  hydrALAZINE 25milliGRAM(s) Oral every 8 hours  furosemide Infusion 10mG/Hr IV Continuous <Continuous>  epoetin juan Injectable [id number](s) SubCutaneous <User Schedule>  ferrous    sulfate 325milliGRAM(s) Oral three times a day with meals  potassium chloride    Tablet ER 40milliEquivalent(s) Oral daily      ANTIBIOTICS: NONE        Review of Systems: - Negative except as mentioned above     Physical Exam:  ICU Vital Signs Last 24 Hrs  T(C): 36.7, Max: 37.1 (05-19 @ 21:52)  T(F): 98, Max: 98.7 ( @ 21:52)  HR: 54 (54 - 56)  BP: 134/68 (134/68 - 189/87)  BP(mean): --  ABP: --  ABP(mean): --  RR: 14 (12 - 24)  SpO2: 100% (97% - 100%)    GEN: NAD, OOB TO CHAIR  HEENT: normocephalic and atraumatic. EOMI. DAISY...  NECK: Supple. No carotid bruits.  No lymphadenopathy or thyromegaly.  LUNGS: Clear to auscultation.  HEART: Regular rate and rhythm without murmur.  ABDOMEN: Soft, nontender, and nondistended.  Positive bowel sounds.  No hepatosplenomegaly was noted.  NO REBOUND NO GUARDING  EXTREMITIES: RIGTH SHOULDER TENDERNESS  NEUROLOGIC: Cranial nerves II through XII are grossly intact.    SKIN: No ulceration or induration present.      Labs:      145  |  106  |  75.0<H>  ----------------------------<  111  3.8   |  20.0<L>  |  3.10<H>    Ca    9.2      20 May 2017 05:25  Phos  5.3       Mg     2.2         TPro  7.2  /  Alb  4.2  /  TBili  0.2<L>  /  DBili  x   /  AST  26  /  ALT  37  /  AlkPhos  132<H>                            10.1   8.8   )-----------( 180      ( 20 May 2017 05:25 )             30.5       PT/INR - ( 20 May 2017 05:25 )   PT: 12.4 sec;   INR: 1.12 ratio         PTT - ( 19 May 2017 17:49 )  PTT:36.3 sec  Urinalysis Basic - ( 19 May 2017 17:50 )    Color: Yellow / Appearance: Clear / S.010 / pH: x  Gluc: x / Ketone: Negative  / Bili: Negative / Urobili: Negative mg/dL   Blood: x / Protein: 100 mg/dL / Nitrite: Negative   Leuk Esterase: Negative / RBC: x / WBC 0-2   Sq Epi: x / Non Sq Epi: x / Bacteria: x      LIVER FUNCTIONS - ( 19 May 2017 17:49 )  Alb: 4.2 g/dL / Pro: 7.2 g/dL / ALK PHOS: 132 U/L / ALT: 37 U/L / AST: 26 U/L / GGT: x           CARDIAC MARKERS ( 19 May 2017 17:49 )  x     / 0.01 ng/mL / x     / x     / x          CAPILLARY BLOOD GLUCOSE  104 (19 May 2017 21:52)
Renal :    F.U - CKD -4 , Renal Anemia, Diastolic CHF, HTN, DM-2     Overnight events/patient complaints: S/P  Steroid  Injection - R Shoulder,    Off Lasix Infusion,    On IV Fe, SC AMY       PAST MEDICAL & SURGICAL HISTORY:  RICHARDS (dyspnea on exertion)  VT (ventricular tachycardia)  HTN (hypertension)  CAD (coronary artery disease)  CKD (chronic kidney disease): stage IV  Arrhythmias  AV block, 1st degree  DM (diabetes mellitus)  H/O angioplasty: ,   H/O left knee surgery        Toprol-XL (Rash)    MEDICATIONS  (STANDING):  aspirin  chewable 81milliGRAM(s) Oral daily  sodium bicarbonate 650milliGRAM(s) Oral four times a day  flecainide 100milliGRAM(s) Oral every 12 hours  atorvastatin 20milliGRAM(s) Oral at bedtime  multivitamin 1Tablet(s) Oral daily  insulin lispro (HumaLOG) corrective regimen sliding scale  SubCutaneous three times a day before meals  HydrALAZINE 25milliGRAM(s) Oral every 8 hours  epoetin juan Injectable [id number](s) SubCutaneous <User Schedule>  ferrous    sulfate 325milliGRAM(s) Oral three times a day with meals  potassium chloride    Tablet ER 40milliEquivalent(s) Oral daily  furosemide    Tablet 40milliGRAM(s) Oral daily  iron sucrose IVPB 100milliGRAM(s) IV Intermittent daily    Vital Signs Last 24 Hrs  T(C): 36.2, Max: 37 ( @ 05:23)  T(F): 97.2, Max: 98.6 (- @ 05:23)  HR: 46 (43 - 62)  BP: 188/58 (130/76 - 189/60)  BP(mean): --  RR: 16 (16 - 16)  SpO2: 96% (96% - 99%)  ICU Vital Signs Last 24 Hrs  CHRISTIANO ELIZABETH  I&O's Detail    I & Os for current day (as of 21 May 2017 09:50)  =============================================  IN:    Oral Fluid: 640 ml    Total IN: 640 ml  ---------------------------------------------  OUT:    Voided: 2550 ml    Total OUT: 2550 ml  ---------------------------------------------  Total NET: -1910 ml    I&O's Summary    I & Os for current day (as of 21 May 2017 09:50)  =============================================  IN: 640 ml / OUT: 2550 ml / NET: -1910 ml      PHYSICAL EXAM:  General: Appears well developed, well nourished alert and cooperative. Pale,  HEENT: Head; normocephalic, atraumatic.  Eyes: Pupils reactive, cornea wnl.  Neck: Supple, no nodes adenopathy, no  JVD or carotid bruit or thyromegaly.  CARDIOVASCULAR: Normal S1 and S2, No murmur, rub, gallop or lift.   LUNGS: No rales, rhonchi or wheeze. Normal breath sounds bilaterally.  ABDOMEN: Soft, nontender without mass or organomegaly. bowel sounds normoactive.  EXTREMITIES: No clubbing, cyanosis Mild edema. Distal pulses wnl. Able to move R - Arm, w. Less pain,  SKIN: warm and dry with normal turgor.  NEURO: Alert/oriented x 3/normal motor exam. No pathologic reflexes.    PSYCH: normal affect.        LABS:                        10.1   8.8   )-----------( 180      ( 20 May 2017 05:25 )             30.5     05-21    x   |  x   |  x   ----------------------------<  x   4.1   |  x   |  x     Ca    9.2      20 May 2017 05:25  Phos  5.3     05-20  Mg     2.2     05-20    TPro  7.2  /  Alb  4.2  /  TBili  0.2<L>  /  DBili  x   /  AST  26  /  ALT  37  /  AlkPhos  132<H>  05-19      CARDIAC MARKERS ( 19 May 2017 17:49 )  x     / 0.01 ng/mL /         PT/INR - ( 20 May 2017 05:25 )   PT: 12.4 sec;   INR: 1.12 ratio         PTT - ( 19 May 2017 17:49 )  PTT:36.3 sec  Urinalysis Basic - ( 19 May 2017 17:50 )    Color: Yellow / Appearance: Clear / S.010 / pH: x  Gluc: x / Ketone: Negative  / Bili: Negative / Urobili: Negative mg/dL   Blood: x / Protein: 100 mg/dL / Nitrite: Negative   Leuk Esterase: Negative / RBC: x / WBC 0-2   Sq Epi: x / Non Sq Epi: x / Bacteria: x        RADIOLOGY & ADDITIONAL STUDIES:    PROCEDURE DATE:  2017        INTERPRETATION:  Portable chest radiograph        CLINICAL INFORMATION:   Short of breath.    TECHNIQUE:  Portable  AP view of the chest was obtained.    COMPARISON: 2016 available for review.    FINDINGS:   The lungs  are clear.  No pleural abnormality is seen.         The  heart is enlarged in transverse diameter. No hilar mass. Trachea   midline.        Visualized osseous structures are intact.        IMPRESSION:   No evidence of active chest disease.            INTERPRETATION OF TELEMETRY : SB    Ventricular Rate 55 BPM    Atrial Rate 55 BPM    P-R Interval 280 ms    QRS Duration 108 ms    Q-T Interval 492 ms    QTC Calculation(Bezet) 470 ms    P Axis 7 degrees    R Axis -22 degrees    T Axis 59 degrees    Diagnosis Line Sinus bradycardia with sinus arrhythmia with 1st degree A-V block  Otherwise normal ECG    Confirmed by Rasheed Stack ([id number]) on 2017 6:53:21 PM        ASSESSMENT AND PLAN:    In summary, 82yo  Male with CKD -4  /Hx arrhythmia/HTN adm for progressive edema-now improved.   Eventual AV fistula.   CardioMems.   Counseled re avoidance of nephrotoxic agents,  Healthy Transition RN - Home visit & coordinate care,    Discussed w. the family @ Length,
Renal :    F.U : CKD -4 , Diastolic CHF , Anemia,    Overnight events/patient complaints:    Edema  improved s/p AVF ( L - Forearm )    Toprol-XL (Rash)    MEDICATIONS  (STANDING):  aspirin  chewable 81milliGRAM(s) Oral daily  flecainide 100milliGRAM(s) Oral every 12 hours  atorvastatin 20milliGRAM(s) Oral at bedtime  insulin lispro (HumaLOG) corrective regimen sliding scale  SubCutaneous three times a day before meals  dextrose 5%. 1000milliLiter(s) IV Continuous <Continuous>  dextrose 50% Injectable 12.5Gram(s) IV Push once  dextrose 50% Injectable 25Gram(s) IV Push once  dextrose 50% Injectable 25Gram(s) IV Push once  epoetin juan Injectable 00672Gtla(s) SubCutaneous <User Schedule>  ferrous    sulfate 325milliGRAM(s) Oral three times a day with meals  potassium chloride    Tablet ER 40milliEquivalent(s) Oral daily  iron sucrose IVPB 100milliGRAM(s) IV Intermittent daily  NIFEdipine XL 60milliGRAM(s) Oral daily  torsemide 20milliGRAM(s) Oral daily  Nephro-pito 1Tablet(s) Oral daily  hydrALAZINE 50milliGRAM(s) Oral every 12 hours  sodium bicarbonate 650milliGRAM(s) Oral three times a day  calcitriol   Capsule 0.25MICROGram(s) Oral daily    MEDICATIONS  (PRN):  acetaminophen   Tablet 650milliGRAM(s) Oral every 6 hours PRN For Temp greater than 38 C (100.4 F)  acetaminophen   Tablet. 650milliGRAM(s) Oral every 6 hours PRN Moderate Pain (4 - 6)  dextrose Gel 1Dose(s) Oral once PRN Blood Glucose LESS THAN 70 milliGRAM(s)/deciliter  glucagon  Injectable 1milliGRAM(s) IntraMuscular once PRN Glucose LESS THAN 70 milligrams/deciliter      Vital Signs Last 24 Hrs  T(C): 36.6, Max: 36.8 (05-21 @ 15:18)  T(F): 97.9, Max: 98.2 (05-21 @ 15:18)  HR: 52 (52 - 63)  BP: 148/54 (130/52 - 176/54)  BP(mean): --  RR: 16 (16 - 18)  SpO2: 97% (96% - 98%)  ICU Vital Signs Last 24 Hrs  CHRISTIANO ELIZABETH  I&O's Detail    I & Os for current day (as of 22 May 2017 10:42)  =============================================  IN:    Oral Fluid: 1320 ml    Total IN: 1320 ml  ---------------------------------------------  OUT:    Voided: 1650 ml    Total OUT: 1650 ml  ---------------------------------------------  Total NET: -330 ml    Drug Dosing Weight  CHRISTIANO ELIZABETH      PHYSICAL EXAM:  General: Appears well developed, well nourished alert and cooperative.  HEENT: Head; normocephalic, atraumatic.  Eyes: Pupils reactive, cornea wnl.  Neck: Supple, no nodes adenopathy, no NVD or carotid bruit or thyromegaly.  CARDIOVASCULAR: Normal S1 and S2, No murmur, rub, gallop or lift.   LUNGS: No rales, rhonchi or wheeze. Normal breath sounds bilaterally.  ABDOMEN: Soft, nontender without mass or organomegaly. Bowel sounds normoactive.  EXTREMITIES: No clubbing, cyanosis or edema. Distal pulses wnl. AVF + B & T,  SKIN: warm and dry with normal turgor.  NEURO: Alert/oriented x 3/normal motor exam. No pathologic reflexes.    PSYCH: normal affect.        LABS:                        10.2   9.4   )-----------( 186      ( 22 May 2017 05:37 )             30.7     05-22    143  |  104  |  89.0<H>  ----------------------------<  123<H>  4.3   |  21.0<L>  |  3.81<H>    Ca    9.0      22 May 2017 05:37  Phos  4.5     05-22    TPro  x   /  Alb  3.7  /  TBili  x   /  DBili  x   /  AST  x   /  ALT  x   /  AlkPhos  x   05-22      RADIOLOGY & ADDITIONAL STUDIES:    INTERPRETATION OF TELEMETRY : SB,     ECHO:  Summary:     1. Left ventricular ejection fraction, by visual estimation, is 55 to   60%.   2. Normal global left ventricular systolic function.  3. Spectral Doppler shows impaired relaxation pattern of left   ventricular myocardial filling (Grade I diastolic dysfunction).   4. There is mild septal left ventricular hypertrophy.   5. Thickening of the anterior and posterior mitral valve leaflets.   6. Mild-moderate tricuspid regurgitation.   7. Sclerotic aortic valve with normal opening.   8. Estimated pulmonary artery systolic pressure is 43.5 mmHg assuming a   right atrial pressure of 8 mmHg, which is consistent with mild pulmonary   hypertension.   9. Mildly enlarged left atrium.     MD Lane Electronically signed on 5/21/2017 at 9:42:48 AM        CARDIAC CATHETERIZATION:     CORONARY VESSELS: The coronary circulation is right dominant.  LM:   --  LM: Normal.  LAD:   --  Proximal LAD: There was a 40 % stenosis.  --  Mid LAD: There was a 30 % stenosis.  --  Distal LAD: Angiography showed minor luminal irregularities withno  flow limiting lesions.  CX:   --  Mid circumflex: There was a 50 % stenosis.  --  OM1: There was a 30 % stenosis at the ostium of the vessel segment.  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  --  Mid RCA: There was a 70 % stenosis.  COMPLICATIONS: There were no complications. No complications occurred  during the cath lab visit.  DIAGNOSTIC IMPRESSIONS: Probably significant RCA disease with unusual take  off with non obstructive CAD of LCX and LAD    ASSESSMENT AND PLAN:    In summary, CHRISTIANO ELIZABETH is an 83y Male with past medical history significant for HTN, CKD - 4, VT controlled on atenolol and lidocaine admitted after recent trip to [place], w. significant salt intake over several days & NSAID use likely cause of LE edema.  S/P AVF    CKD - 4 Optimized,    F.U - in Office for management,    HD in 6-8 weeks,    Discussed w.,
Renal :    F.U : Fluid Overload,    DM-HTN    CKD - 4 , Renal Anemia,    Cardiac Arrhythmias, LV Dysfunction,                                                                   Feels well, since admission,    83 yr old W male with CKD-4, hypertension, ventricular bigeminy, carotid disease, anemia, diabetes mellitus admitted with leg swelling.   States that he had noted his leg swelling to be worse over the last few weeks and was unable to wear his shoe today.  He reports that he walks about 50 feet and feels tired and has to sit down. He has been suffering from right shoulder pain for which he took Ibuprofen, about 9-10 tablets daily,  He denies chest pain.   He was started on iron supplements.   Denies abdominal pain.   Reports had black stools this am.       PMD: Irvin  Cardio: [place]- Dr. Walter,  GI: Maya (19 May 2017 19:17)    Allergies:  Toprol-XL (Rash)    Medications:  acetaminophen   Tablet 650milliGRAM(s) Oral every 6 hours PRN  acetaminophen   Tablet. 650milliGRAM(s) Oral every 6 hours PRN  aspirin  chewable 81milliGRAM(s) Oral daily  sodium bicarbonate 650milliGRAM(s) Oral four times a day  flecainide 100milliGRAM(s) Oral every 12 hours  atorvastatin 20milliGRAM(s) Oral at bedtime  multivitamin 1Tablet(s) Oral daily  insulin lispro (HumaLOG) corrective regimen sliding scale  SubCutaneous three times a day before meals  HydrALAZINE 25milliGRAM(s) Oral every 8 hours  furosemide Infusion 10mG/Hr IV Continuous <Continuous>  epoetin juan Injectable [id number](s) SubCutaneous <User Schedule>  ferrous    sulfate 325milliGRAM(s) Oral three times a day with meals  potassium chloride    Tablet ER 40milliEquivalent(s) Oral daily    Review of Systems: - Negative except as mentioned above    Physical Exam:    ICU Vital Signs Last 24 Hrs  T(C): 36.7, Max: 37.1 (- @ 21:52)  T(F): 98, Max: 98.7 (-19 @ 21:52)  HR: 54 (54 - 56)  BP: 134/68 (134/68 - 189/87)  RR: 14 (12 - 24)  SpO2: 100% (97% - 100%)    GEN: NAD, Pale,  HEENT: normocephalic and atraumatic. EOMI. DAISY.  NECK: Supple. No carotid bruits.  No lymphadenopathy or thyromegaly.  LUNGS: Clear to auscultation.  HEART: Regular rate and rhythm with soft Systolic  murmur.  ABDOMEN: Soft, nontender, and nondistended.  Positive bowel sounds.    EXTREMITIES: ROM  R - Shoulder  limited, Tender,  NEUROLOGIC: Cranial nerves II through XII are grossly intact.  SKIN: No ulceration or induration present.      Labs:        145  |  106  |  75.0<H>  ----------------------------<  111  3.8   |  20.0<L>  |  3.10<H>    Ca    9.2      20 May 2017 05:25  Phos  5.3       Mg     2.2         TPro  7.2  /  Alb  4.2  /  TBili  0.2<L>  /  DBili  x   /  AST  26  /  ALT  37  /  AlkPhos  132<H>                            10.1   8.8   )-----------( 180      ( 20 May 2017 05:25 )             30.5       PT/INR - ( 20 May 2017 05:25 )   PT: 12.4 sec;   INR: 1.12 ratio         PTT - ( 19 May 2017 17:49 )  PTT:36.3 sec  Urinalysis Basic - ( 19 May 2017 17:50 )    Color: Yellow / Appearance: Clear / S.010 / pH: x  Gluc: x / Ketone: Negative  / Bili: Negative / Urobili: Negative mg/dL   Blood: x / Protein: 100 mg/dL / Nitrite: Negative   Leuk Esterase: Negative / RBC: x / WBC 0-2   Sq Epi: x / Non Sq Epi: x / Bacteria: x      LIVER FUNCTIONS - ( 19 May 2017 17:49 )    Alb: 4.2 g/dL / Pro: 7.2 g/dL / ALK PHOS: 132 U/L / ALT: 37 U/L / AST: 26 U/L            CARDIAC MARKERS ( 19 May 2017 17:49 )     0.01 ng/mL        CAPILLARY BLOOD GLUCOSE:    104 mg.,  (19 May 2017 21:52)            Assessment and Plan:   		  83 yr old male with CKD - 4 , hypertension, ventricular bigeminy, carotid disease, anemia, diabetes mellitus admitted with Increasing Pedal Edema, Pallor, RICHARDS & [place] eGFR w. Early uremia,    ( Acute decline likely related to  poorly controlled B.G, Recent NSAID usage & Medication Discrepancy )       Problem/Plan - 1:  · CKD - 4 (chronic kidney disease).      Plan: AVF, Preparation for HD      Problem/Plan - 2:  ·  RICHARDS (dyspnea on exertion) - Cardiac evaln.,     Problem/Plan - 4:  ·  Acute pain of right shoulder.    Plan: Orthopedic Evaluation,    Discussed wShanice Kenny,
[facility]                                   [address] [place] [address]                                                      PHONE: [phone]                                                         FAX: [phone]  [email or web address]  ---------------------------------------------------------------------------------------------------------------------------------    Overnight events/patient complaints:    No cp/sob.  PAST MEDICAL & SURGICAL HISTORY:  RICHARDS (dyspnea on exertion)  VT (ventricular tachycardia)  HTN (hypertension)  CAD (coronary artery disease)  CKD (chronic kidney disease): stage IV  Arrhythmia  AV block, 1st degree  DM (diabetes mellitus)  H/O angioplasty: ,  no  intervention  H/O left knee surgery  H/O circumcision: at  age  65      Toprol-XL (Rash)    MEDICATIONS  (STANDING):  aspirin  chewable 81milliGRAM(s) Oral daily  sodium bicarbonate 650milliGRAM(s) Oral four times a day  flecainide 100milliGRAM(s) Oral every 12 hours  atorvastatin 20milliGRAM(s) Oral at bedtime  multivitamin 1Tablet(s) Oral daily  insulin lispro (HumaLOG) corrective regimen sliding scale  SubCutaneous three times a day before meals  dextrose 5%. 1000milliLiter(s) IV Continuous <Continuous>  dextrose 50% Injectable 12.5Gram(s) IV Push once  dextrose 50% Injectable 25Gram(s) IV Push once  dextrose 50% Injectable 25Gram(s) IV Push once  hydrALAZINE 25milliGRAM(s) Oral every 8 hours  epoetin juan Injectable 91957Zise(s) SubCutaneous <User Schedule>  ferrous    sulfate 325milliGRAM(s) Oral three times a day with meals  potassium chloride    Tablet ER 40milliEquivalent(s) Oral daily  furosemide    Tablet 40milliGRAM(s) Oral daily  iron sucrose IVPB 100milliGRAM(s) IV Intermittent daily    MEDICATIONS  (PRN):  acetaminophen   Tablet 650milliGRAM(s) Oral every 6 hours PRN For Temp greater than 38 C (100.4 F)  acetaminophen   Tablet. 650milliGRAM(s) Oral every 6 hours PRN Moderate Pain (4 - 6)  dextrose Gel 1Dose(s) Oral once PRN Blood Glucose LESS THAN 70 milliGRAM(s)/deciliter  glucagon  Injectable 1milliGRAM(s) IntraMuscular once PRN Glucose LESS THAN 70 milligrams/deciliter      Vital Signs Last 24 Hrs  T(C): 36.2, Max: 37 (05-21 @ 05:23)  T(F): 97.2, Max: 98.6 (05-21 @ 05:23)  HR: 46 (43 - 62)  BP: 188/58 (130/76 - 189/60)  BP(mean): --  RR: 16 (16 - 16)  SpO2: 96% (96% - 99%)  ICU Vital Signs Last 24 Hrs  CHRISTIANO ELIZABETH  I&O's Detail    I & Os for current day (as of 21 May 2017 09:50)  =============================================  IN:    Oral Fluid: 640 ml    Total IN: 640 ml  ---------------------------------------------  OUT:    Voided: 2550 ml    Total OUT: 2550 ml  ---------------------------------------------  Total NET: -1910 ml    I&O's Summary    I & Os for current day (as of 21 May 2017 09:50)  =============================================  IN: 640 ml / OUT: 2550 ml / NET: -1910 ml    Drug Dosing Weight  CHRISTIANO ELIZABETH      PHYSICAL EXAM:  General: Appears well developed, well nourished alert and cooperative.  HEENT: Head; normocephalic, atraumatic.  Eyes: Pupils reactive, cornea wnl.  Neck: Supple, no nodes adenopathy, no NVD or carotid bruit or thyromegaly.  CARDIOVASCULAR: Normal S1 and S2, No murmur, rub, gallop or lift.   LUNGS: No rales, rhonchi or wheeze. Normal breath sounds bilaterally.  ABDOMEN: Soft, nontender without mass or organomegaly. bowel sounds normoactive.  EXTREMITIES: No clubbing, cyanosis Mild edema. Distal pulses wnl.   SKIN: warm and dry with normal turgor.  NEURO: Alert/oriented x 3/normal motor exam. No pathologic reflexes.    PSYCH: normal affect.        LABS:                        10.1   8.8   )-----------( 180      ( 20 May 2017 05:25 )             30.5     05-21    x   |  x   |  x   ----------------------------<  x   4.1   |  x   |  x     Ca    9.2      20 May 2017 05:25  Phos  5.3     05-20  Mg     2.2     05-20    TPro  7.2  /  Alb  4.2  /  TBili  0.2<L>  /  DBili  x   /  AST  26  /  ALT  37  /  AlkPhos  132<H>      CHRISTIANO ELIZABETH  CARDIAC MARKERS ( 19 May 2017 17:49 )  x     / 0.01 ng/mL / x     / x     / x          PT/INR - ( 20 May 2017 05:25 )   PT: 12.4 sec;   INR: 1.12 ratio         PTT - ( 19 May 2017 17:49 )  PTT:36.3 sec  Urinalysis Basic - ( 19 May 2017 17:50 )    Color: Yellow / Appearance: Clear / S.010 / pH: x  Gluc: x / Ketone: Negative  / Bili: Negative / Urobili: Negative mg/dL   Blood: x / Protein: 100 mg/dL / Nitrite: Negative   Leuk Esterase: Negative / RBC: x / WBC 0-2   Sq Epi: x / Non Sq Epi: x / Bacteria: x        RADIOLOGY & ADDITIONAL STUDIES:    INTERPRETATION OF TELEMETRY (personally reviewed):    ECG:    ECHO:    STRESS TEST:    CARDIAC CATHETERIZATION:    ASSESSMENT AND PLAN:  In summary, CHRISTIANO ELIZABETH is an 83y Male with CRI/Hx arrhythmia/HTN adm for progressive edema-now improved. Eventual AV fistula. From cardiac point of view pt can go home and have meds titrated as outpt. Will see in office to discuss cardioMems. Counseled re avoidance of nephrotoxic agents-incl over-diuresis. Call back if needed.    Thank you for allowing [facility] to participate in the care of this patient.  Please feel free to call with any questions or concerns.

## 2017-05-24 NOTE — DISCHARGE NOTE ADULT - MEDICATION SUMMARY - MEDICATIONS TO STOP TAKING
I will STOP taking the medications listed below when I get home from the hospital:    atenolol 25 mg oral tablet  -- 0.5 tab(s) by mouth once a day    torsemide 10 mg oral tablet  -- 1 tab(s) by mouth once a day    prednisone  -- 40 milligram(s) by mouth once a day

## 2017-05-24 NOTE — DISCHARGE NOTE ADULT - CARE PLAN
Principal Discharge DX:	Stage 4 chronic kidney disease  Goal:	imrpovemnt  Instructions for follow-up, activity and diet:	follow up with renal  Secondary Diagnosis:	Diastolic heart failure, unspecified heart failure chronicity  Secondary Diagnosis:	Acute pain of right shoulder  Secondary Diagnosis:	Essential hypertension  Secondary Diagnosis:	DM (diabetes mellitus)

## 2017-05-24 NOTE — PROGRESS NOTE ADULT - PROBLEM SELECTOR PROBLEM 2
Essential hypertension
Pedal edema
Anemia associated with stage 4 chronic renal failure
Pedal edema
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

## 2017-05-24 NOTE — DISCHARGE NOTE ADULT - CARE PROVIDERS DIRECT ADDRESSES
,DirectAddress_Unknown,[email or web address],DirectAddress_Unknown

## 2017-05-24 NOTE — DISCHARGE NOTE ADULT - MEDICATION SUMMARY - MEDICATIONS TO TAKE
I will START or STAY ON the medications listed below when I get home from the hospital:    aspirin 81 mg oral tablet  -- 1 tab(s) by mouth once a day  -- Indication: For Diastolic heart failure, unspecified heart failure chronicity    sodium bicarbonate 650 mg oral tablet  -- 1 tab(s) by mouth 4 times a day  -- Indication: For CKD (chronic kidney disease)    flecainide 100 mg oral tablet  -- 1 tab(s) by mouth every 12 hours  -- Indication: For Diastolic heart failure, unspecified heart failure chronicity    glipiZIDE 5 mg oral tablet  -- 1 tab(s) by mouth once a day  -- Indication: For DM (diabetes mellitus)    Lipitor 20 mg oral tablet  -- 1 tab(s) by mouth once a day  -- Indication: For Diastolic heart failure, unspecified heart failure chronicity    NIFEdipine 60 mg oral tablet, extended release  -- 1 tab(s) by mouth once a day  -- Indication: For HTN (hypertension)    torsemide 20 mg oral tablet  -- 1 tab(s) by mouth once a day  -- Indication: For CKD (chronic kidney disease)    ferrous sulfate 325 mg (65 mg elemental iron) oral delayed release tablet  -- 1 tab(s) by mouth once a day  -- Indication: For Anemia associated with stage 4 chronic renal failure    potassium chloride 20 mEq oral tablet, extended release  -- 2 tab(s) by mouth once a day  -- Indication: For CKD (chronic kidney disease)    hydrALAZINE 50 mg oral tablet  -- 1 tab(s) by mouth every 12 hours  -- Indication: For HTN (hypertension)    Nephro-Thomas oral tablet  -- 1 tab(s) by mouth once a day  -- Indication: For CKD (chronic kidney disease)    calcitriol 0.25 mcg oral capsule  -- 1 cap(s) by mouth once a day  -- Indication: For CKD (chronic kidney disease)

## 2017-05-24 NOTE — PROGRESS NOTE ADULT - PROBLEM SELECTOR PROBLEM 3
Anemia associated with stage 4 chronic renal failure
RICHARDS (dyspnea on exertion)
Anemia associated with stage 4 chronic renal failure
RICHARDS (dyspnea on exertion)
Type 2 diabetes mellitus with other kidney complication

## 2017-05-24 NOTE — PROGRESS NOTE ADULT - PROBLEM SELECTOR PLAN 1
FOLLOW UP WITH RENAL  ON  ORAL DIURETIC   SPOKE TO RENAL   FOR AVF PLACEMENT  VASC AWARE
FOLLOW UP WITH RENAL  ON  ORAL DIURETIC   SPOKE TO RENAL   FOR AVF TODAY
FOLLOW UP WITH RENAL  ON  ORAL DIURETIC  AVF PLACEMENT SPOKE TO RENAL   VASC AWARE
optimized
Being optimized,
FOLLOW UP WITH RENAL  ON IV LASIX  ?AVF EVENTUALLY
Optimized    AVF - Functional,

## 2017-05-24 NOTE — DISCHARGE NOTE ADULT - CARE PROVIDER_API CALL
Mango Peterson), Cardiovascular Disease; Internal Medicine  [address] [place] [address]  Phone: [phone]  Fax: [phone]    Bay Avendano (MCKAY), Internal Medicine; Nephrology  [address] [place] [address]  Phone: [phone]  Fax: [phone]    Jeet Bryan), Infectious Disease; Internal Medicine  [address] [place] [address]  Phone: [phone]  Fax: [phone]

## 2017-05-24 NOTE — DISCHARGE NOTE ADULT - INSTRUCTIONS
Renal diet KEEP L ARM ELEVATED ON PILLOWS TO DECREASE SWELLING , NO HEAVY LIFTING, LEAVE STERISTRIPS IN PLACE TILL THEY FALL OFF

## 2017-05-24 NOTE — PROGRESS NOTE ADULT - PROBLEM SELECTOR PLAN 2
IMPROVED ON PROCARDIA CONTINUE HYDRALAZINE
SECONDARY TO RENAL
AMY, S/P IV Fe,
SECONDARY TO RENAL
per IM,

## 2017-05-24 NOTE — DISCHARGE NOTE ADULT - PATIENT PORTAL LINK FT
“You can access the Follow[employer] Patient Portal, offered by [facility], by registering with the following website: [email or web address]”

## 2017-05-24 NOTE — PROGRESS NOTE ADULT - PROVIDER SPECIALTY LIST ADULT
Anesthesia
Cardiology
Infectious Disease
Internal Medicine
Nephrology
Orthopedics
Vascular Surgery
Vascular Surgery
Nephrology

## 2017-05-26 ENCOUNTER — CHART COPY (OUTPATIENT)
Age: 82
End: 2017-05-26

## 2017-06-09 ENCOUNTER — APPOINTMENT (OUTPATIENT)
Dept: VASCULAR SURGERY | Facility: CLINIC | Age: 82
End: 2017-06-09

## 2017-06-09 VITALS
SYSTOLIC BLOOD PRESSURE: 175 MMHG | HEIGHT: 66 IN | OXYGEN SATURATION: 97 % | DIASTOLIC BLOOD PRESSURE: 63 MMHG | TEMPERATURE: 98.8 F | HEART RATE: 68 BPM | WEIGHT: 184 LBS | RESPIRATION RATE: 16 BRPM | BODY MASS INDEX: 29.57 KG/M2

## 2017-06-13 ENCOUNTER — APPOINTMENT (OUTPATIENT)
Dept: SURGERY | Facility: CLINIC | Age: 82
End: 2017-06-13

## 2017-06-16 ENCOUNTER — APPOINTMENT (OUTPATIENT)
Dept: NEPHROLOGY | Facility: CLINIC | Age: 82
End: 2017-06-16

## 2017-06-16 VITALS
BODY MASS INDEX: 29.57 KG/M2 | HEIGHT: 66 IN | DIASTOLIC BLOOD PRESSURE: 60 MMHG | WEIGHT: 184 LBS | SYSTOLIC BLOOD PRESSURE: 160 MMHG

## 2017-06-18 LAB
ALBUMIN SERPL ELPH-MCNC: 4.5 G/DL
ANION GAP SERPL CALC-SCNC: 20 MMOL/L
BASOPHILS # BLD AUTO: 0.08 K/UL
BASOPHILS NFR BLD AUTO: 1 %
BUN SERPL-MCNC: 73 MG/DL
CALCIUM SERPL-MCNC: 9.3 MG/DL
CALCIUM SERPL-MCNC: 9.3 MG/DL
CHLORIDE SERPL-SCNC: 104 MMOL/L
CO2 SERPL-SCNC: 17 MMOL/L
CREAT SERPL-MCNC: 3.09 MG/DL
EOSINOPHIL # BLD AUTO: 0.19 K/UL
EOSINOPHIL NFR BLD AUTO: 2.4 %
GLUCOSE SERPL-MCNC: 70 MG/DL
HCT VFR BLD CALC: 31.1 %
HGB BLD-MCNC: 10 G/DL
IMM GRANULOCYTES NFR BLD AUTO: 0.1 %
LYMPHOCYTES # BLD AUTO: 1.08 K/UL
LYMPHOCYTES NFR BLD AUTO: 13.7 %
MAN DIFF?: NORMAL
MCHC RBC-ENTMCNC: 28.8 PG
MCHC RBC-ENTMCNC: 32.2 GM/DL
MCV RBC AUTO: 89.6 FL
MONOCYTES # BLD AUTO: 0.77 K/UL
MONOCYTES NFR BLD AUTO: 9.8 %
NEUTROPHILS # BLD AUTO: 5.76 K/UL
NEUTROPHILS NFR BLD AUTO: 73 %
PARATHYROID HORMONE INTACT: 83 PG/ML
PHOSPHATE SERPL-MCNC: 4.8 MG/DL
PLATELET # BLD AUTO: 214 K/UL
POTASSIUM SERPL-SCNC: 4.3 MMOL/L
RBC # BLD: 3.47 M/UL
RBC # FLD: 15.2 %
SODIUM SERPL-SCNC: 141 MMOL/L
WBC # FLD AUTO: 7.89 K/UL

## 2017-07-12 RX ORDER — ERYTHROPOIETIN 40000 [IU]/ML
40000 INJECTION, SOLUTION INTRAVENOUS; SUBCUTANEOUS
Qty: 1 | Refills: 0 | Status: COMPLETED | OUTPATIENT
Start: 2017-03-23

## 2017-07-25 ENCOUNTER — APPOINTMENT (OUTPATIENT)
Dept: VASCULAR SURGERY | Facility: CLINIC | Age: 82
End: 2017-07-25

## 2017-07-25 VITALS
DIASTOLIC BLOOD PRESSURE: 63 MMHG | BODY MASS INDEX: 29.73 KG/M2 | TEMPERATURE: 98.1 F | WEIGHT: 185 LBS | SYSTOLIC BLOOD PRESSURE: 175 MMHG | OXYGEN SATURATION: 98 % | RESPIRATION RATE: 16 BRPM | HEART RATE: 66 BPM | HEIGHT: 66 IN

## 2017-07-27 ENCOUNTER — OUTPATIENT (OUTPATIENT)
Dept: OUTPATIENT SERVICES | Facility: HOSPITAL | Age: 82
LOS: 1 days | End: 2017-07-27
Payer: MEDICARE

## 2017-07-27 ENCOUNTER — APPOINTMENT (OUTPATIENT)
Dept: MRI IMAGING | Facility: CLINIC | Age: 82
End: 2017-07-27
Payer: MEDICARE

## 2017-07-27 DIAGNOSIS — Z98.890 OTHER SPECIFIED POSTPROCEDURAL STATES: Chronic | ICD-10-CM

## 2017-07-27 DIAGNOSIS — I65.29 OCCLUSION AND STENOSIS OF UNSPECIFIED CAROTID ARTERY: ICD-10-CM

## 2017-07-27 DIAGNOSIS — Z98.62 PERIPHERAL VASCULAR ANGIOPLASTY STATUS: Chronic | ICD-10-CM

## 2017-07-27 PROCEDURE — 70547 MR ANGIOGRAPHY NECK W/O DYE: CPT

## 2017-07-27 PROCEDURE — 70547 MR ANGIOGRAPHY NECK W/O DYE: CPT | Mod: 26

## 2017-07-28 ENCOUNTER — APPOINTMENT (OUTPATIENT)
Dept: NEPHROLOGY | Facility: CLINIC | Age: 82
End: 2017-07-28
Payer: MEDICARE

## 2017-07-28 VITALS
BODY MASS INDEX: 29.73 KG/M2 | WEIGHT: 185 LBS | DIASTOLIC BLOOD PRESSURE: 60 MMHG | SYSTOLIC BLOOD PRESSURE: 144 MMHG | HEIGHT: 66 IN

## 2017-07-28 PROCEDURE — 36415 COLL VENOUS BLD VENIPUNCTURE: CPT

## 2017-07-28 PROCEDURE — 99215 OFFICE O/P EST HI 40 MIN: CPT | Mod: 25

## 2017-07-29 LAB
25(OH)D3 SERPL-MCNC: 31 NG/ML
ALBUMIN SERPL ELPH-MCNC: 4.1 G/DL
ANION GAP SERPL CALC-SCNC: 18 MMOL/L
APPEARANCE: CLEAR
BACTERIA: NEGATIVE
BASOPHILS # BLD AUTO: 0.06 K/UL
BASOPHILS NFR BLD AUTO: 0.7 %
BILIRUBIN URINE: NEGATIVE
BLOOD URINE: NEGATIVE
BUN SERPL-MCNC: 62 MG/DL
CALCIUM SERPL-MCNC: 9.3 MG/DL
CHLORIDE SERPL-SCNC: 105 MMOL/L
CO2 SERPL-SCNC: 21 MMOL/L
COLOR: YELLOW
CREAT SERPL-MCNC: 3.13 MG/DL
CREAT SPEC-SCNC: 35 MG/DL
CREAT/PROT UR: 2.9 RATIO
EOSINOPHIL # BLD AUTO: 0.12 K/UL
EOSINOPHIL NFR BLD AUTO: 1.4 %
GLUCOSE QUALITATIVE U: NORMAL MG/DL
GLUCOSE SERPL-MCNC: 90 MG/DL
HCT VFR BLD CALC: 27.5 %
HGB BLD-MCNC: 8.6 G/DL
HYALINE CASTS: 1 /LPF
IMM GRANULOCYTES NFR BLD AUTO: 0.2 %
KETONES URINE: NEGATIVE
LEUKOCYTE ESTERASE URINE: NEGATIVE
LYMPHOCYTES # BLD AUTO: 1.21 K/UL
LYMPHOCYTES NFR BLD AUTO: 14.3 %
MAN DIFF?: NORMAL
MCHC RBC-ENTMCNC: 28.7 PG
MCHC RBC-ENTMCNC: 31.3 GM/DL
MCV RBC AUTO: 91.7 FL
MICROSCOPIC-UA: NORMAL
MONOCYTES # BLD AUTO: 0.43 K/UL
MONOCYTES NFR BLD AUTO: 5.1 %
NEUTROPHILS # BLD AUTO: 6.63 K/UL
NEUTROPHILS NFR BLD AUTO: 78.3 %
NITRITE URINE: NEGATIVE
PH URINE: 6
PHOSPHATE SERPL-MCNC: 4.3 MG/DL
PLATELET # BLD AUTO: 221 K/UL
POTASSIUM SERPL-SCNC: 4.3 MMOL/L
PROT UR-MCNC: 102 MG/DL
PROTEIN URINE: 100 MG/DL
RBC # BLD: 3 M/UL
RBC # FLD: 15.5 %
RED BLOOD CELLS URINE: 1 /HPF
SODIUM SERPL-SCNC: 144 MMOL/L
SPECIFIC GRAVITY URINE: 1.01
SQUAMOUS EPITHELIAL CELLS: 1 /HPF
UROBILINOGEN URINE: NORMAL MG/DL
WBC # FLD AUTO: 8.47 K/UL
WHITE BLOOD CELLS URINE: 0 /HPF

## 2017-08-03 ENCOUNTER — APPOINTMENT (OUTPATIENT)
Dept: ORTHOPEDIC SURGERY | Facility: CLINIC | Age: 82
End: 2017-08-03
Payer: MEDICARE

## 2017-08-03 VITALS
BODY MASS INDEX: 29.73 KG/M2 | DIASTOLIC BLOOD PRESSURE: 63 MMHG | WEIGHT: 185 LBS | SYSTOLIC BLOOD PRESSURE: 177 MMHG | HEART RATE: 62 BPM | HEIGHT: 66 IN

## 2017-08-03 PROCEDURE — 20610 DRAIN/INJ JOINT/BURSA W/O US: CPT | Mod: RT

## 2017-08-03 PROCEDURE — 99204 OFFICE O/P NEW MOD 45 MIN: CPT | Mod: 25

## 2017-08-03 PROCEDURE — 73030 X-RAY EXAM OF SHOULDER: CPT | Mod: RT

## 2017-08-11 ENCOUNTER — APPOINTMENT (OUTPATIENT)
Dept: CHRONIC DISEASE MANAGEMENT | Facility: CLINIC | Age: 82
End: 2017-08-11

## 2017-08-22 ENCOUNTER — APPOINTMENT (OUTPATIENT)
Dept: VASCULAR SURGERY | Facility: CLINIC | Age: 82
End: 2017-08-22

## 2017-08-25 ENCOUNTER — APPOINTMENT (OUTPATIENT)
Dept: NEPHROLOGY | Facility: CLINIC | Age: 82
End: 2017-08-25
Payer: MEDICARE

## 2017-08-25 LAB — HEMOGLOBIN: 8.5

## 2017-08-25 PROCEDURE — 36415 COLL VENOUS BLD VENIPUNCTURE: CPT

## 2017-08-25 PROCEDURE — 85018 HEMOGLOBIN: CPT | Mod: QW

## 2017-08-25 PROCEDURE — 96372 THER/PROPH/DIAG INJ SC/IM: CPT

## 2017-08-25 PROCEDURE — 99215 OFFICE O/P EST HI 40 MIN: CPT | Mod: 25

## 2017-08-25 RX ORDER — NIFEDIPINE 30 MG/1
30 TABLET, FILM COATED, EXTENDED RELEASE ORAL
Qty: 90 | Refills: 0 | Status: DISCONTINUED | COMMUNITY
Start: 2017-06-16 | End: 2017-08-25

## 2017-08-25 RX ORDER — ATORVASTATIN CALCIUM 20 MG/1
20 TABLET, FILM COATED ORAL
Qty: 30 | Refills: 0 | Status: DISCONTINUED | COMMUNITY
Start: 2017-02-24 | End: 2017-08-25

## 2017-08-25 RX ORDER — POTASSIUM CHLORIDE 1500 MG/1
20 TABLET, FILM COATED, EXTENDED RELEASE ORAL
Qty: 90 | Refills: 1 | Status: DISCONTINUED | COMMUNITY
Start: 2017-05-24 | End: 2017-08-25

## 2017-08-25 RX ORDER — ERYTHROPOIETIN 40000 [IU]/ML
40000 INJECTION, SOLUTION INTRAVENOUS; SUBCUTANEOUS
Qty: 1 | Refills: 0 | Status: COMPLETED | OUTPATIENT
Start: 2017-08-25

## 2017-08-25 RX ORDER — HYDRALAZINE HYDROCHLORIDE 50 MG/1
50 TABLET ORAL
Qty: 60 | Refills: 0 | Status: DISCONTINUED | COMMUNITY
Start: 2017-05-24 | End: 2017-08-25

## 2017-08-25 RX ADMIN — ERYTHROPOIETIN 0 UNIT/ML: 40000 INJECTION, SOLUTION INTRAVENOUS; SUBCUTANEOUS at 00:00

## 2017-08-26 LAB
25(OH)D3 SERPL-MCNC: 33.1 NG/ML
ALBUMIN SERPL ELPH-MCNC: 3.9 G/DL
ANION GAP SERPL CALC-SCNC: 19 MMOL/L
BASOPHILS # BLD AUTO: 0.05 K/UL
BASOPHILS NFR BLD AUTO: 0.6 %
BUN SERPL-MCNC: 66 MG/DL
CALCIUM SERPL-MCNC: 9.5 MG/DL
CALCIUM SERPL-MCNC: 9.5 MG/DL
CHLORIDE SERPL-SCNC: 104 MMOL/L
CO2 SERPL-SCNC: 22 MMOL/L
CREAT SERPL-MCNC: 3.48 MG/DL
EOSINOPHIL # BLD AUTO: 0.15 K/UL
EOSINOPHIL NFR BLD AUTO: 1.7 %
GLUCOSE SERPL-MCNC: 79 MG/DL
HBA1C MFR BLD HPLC: 5.3 %
HCT VFR BLD CALC: 28.4 %
HGB BLD-MCNC: 8.7 G/DL
IMM GRANULOCYTES NFR BLD AUTO: 0.2 %
LYMPHOCYTES # BLD AUTO: 1.24 K/UL
LYMPHOCYTES NFR BLD AUTO: 14.3 %
MAN DIFF?: NORMAL
MCHC RBC-ENTMCNC: 28.6 PG
MCHC RBC-ENTMCNC: 30.6 GM/DL
MCV RBC AUTO: 93.4 FL
MONOCYTES # BLD AUTO: 0.48 K/UL
MONOCYTES NFR BLD AUTO: 5.5 %
NEUTROPHILS # BLD AUTO: 6.71 K/UL
NEUTROPHILS NFR BLD AUTO: 77.7 %
PARATHYROID HORMONE INTACT: 131 PG/ML
PHOSPHATE SERPL-MCNC: 5 MG/DL
PLATELET # BLD AUTO: 241 K/UL
POTASSIUM SERPL-SCNC: 5.5 MMOL/L
RBC # BLD: 3.04 M/UL
RBC # FLD: 15.6 %
SODIUM SERPL-SCNC: 145 MMOL/L
URATE SERPL-MCNC: 9.2 MG/DL
WBC # FLD AUTO: 8.65 K/UL

## 2017-08-27 LAB — TACROLIMUS SERPL-MCNC: <2

## 2017-09-05 ENCOUNTER — APPOINTMENT (OUTPATIENT)
Dept: VASCULAR SURGERY | Facility: CLINIC | Age: 82
End: 2017-09-05
Payer: MEDICARE

## 2017-09-05 VITALS
SYSTOLIC BLOOD PRESSURE: 162 MMHG | WEIGHT: 182 LBS | RESPIRATION RATE: 16 BRPM | HEIGHT: 66 IN | BODY MASS INDEX: 29.25 KG/M2 | DIASTOLIC BLOOD PRESSURE: 62 MMHG | HEART RATE: 67 BPM | TEMPERATURE: 98.5 F | OXYGEN SATURATION: 98 %

## 2017-09-05 PROCEDURE — 99214 OFFICE O/P EST MOD 30 MIN: CPT | Mod: 25

## 2017-09-05 PROCEDURE — 93880 EXTRACRANIAL BILAT STUDY: CPT

## 2017-09-14 ENCOUNTER — APPOINTMENT (OUTPATIENT)
Dept: VASCULAR SURGERY | Facility: CLINIC | Age: 82
End: 2017-09-14
Payer: MEDICARE

## 2017-09-14 VITALS
DIASTOLIC BLOOD PRESSURE: 70 MMHG | HEART RATE: 70 BPM | BODY MASS INDEX: 28.77 KG/M2 | TEMPERATURE: 98.5 F | SYSTOLIC BLOOD PRESSURE: 180 MMHG | HEIGHT: 66 IN | WEIGHT: 179 LBS

## 2017-09-14 PROCEDURE — 99213 OFFICE O/P EST LOW 20 MIN: CPT

## 2017-09-22 ENCOUNTER — APPOINTMENT (OUTPATIENT)
Dept: VASCULAR SURGERY | Facility: CLINIC | Age: 82
End: 2017-09-22
Payer: MEDICARE

## 2017-09-22 ENCOUNTER — APPOINTMENT (OUTPATIENT)
Dept: NEPHROLOGY | Facility: CLINIC | Age: 82
End: 2017-09-22
Payer: MEDICARE

## 2017-09-22 VITALS
DIASTOLIC BLOOD PRESSURE: 81 MMHG | HEIGHT: 66 IN | HEART RATE: 63 BPM | WEIGHT: 179 LBS | BODY MASS INDEX: 28.77 KG/M2 | SYSTOLIC BLOOD PRESSURE: 174 MMHG | OXYGEN SATURATION: 98 % | RESPIRATION RATE: 16 BRPM | TEMPERATURE: 97.8 F

## 2017-09-22 VITALS
HEART RATE: 59 BPM | BODY MASS INDEX: 28.93 KG/M2 | OXYGEN SATURATION: 96 % | HEIGHT: 66 IN | WEIGHT: 180 LBS | DIASTOLIC BLOOD PRESSURE: 58 MMHG | SYSTOLIC BLOOD PRESSURE: 120 MMHG

## 2017-09-22 DIAGNOSIS — N18.3 CHRONIC KIDNEY DISEASE, STAGE 3 (MODERATE): ICD-10-CM

## 2017-09-22 LAB — HEMOGLOBIN: 8.8

## 2017-09-22 PROCEDURE — 99214 OFFICE O/P EST MOD 30 MIN: CPT

## 2017-09-22 PROCEDURE — 96372 THER/PROPH/DIAG INJ SC/IM: CPT

## 2017-09-22 PROCEDURE — 85018 HEMOGLOBIN: CPT | Mod: QW

## 2017-09-22 PROCEDURE — 36415 COLL VENOUS BLD VENIPUNCTURE: CPT

## 2017-09-22 PROCEDURE — 99215 OFFICE O/P EST HI 40 MIN: CPT | Mod: 25

## 2017-09-23 LAB
ALBUMIN SERPL ELPH-MCNC: 4.1 G/DL
ANION GAP SERPL CALC-SCNC: 19 MMOL/L
BUN SERPL-MCNC: 78 MG/DL
CALCIUM SERPL-MCNC: 9.6 MG/DL
CHLORIDE SERPL-SCNC: 102 MMOL/L
CO2 SERPL-SCNC: 22 MMOL/L
CREAT SERPL-MCNC: 3.64 MG/DL
GLUCOSE SERPL-MCNC: 166 MG/DL
PHOSPHATE SERPL-MCNC: 4.4 MG/DL
POTASSIUM SERPL-SCNC: 4.4 MMOL/L
SODIUM SERPL-SCNC: 143 MMOL/L

## 2017-09-28 ENCOUNTER — RESULT CHARGE (OUTPATIENT)
Age: 82
End: 2017-09-28

## 2017-09-29 ENCOUNTER — APPOINTMENT (OUTPATIENT)
Dept: NEPHROLOGY | Facility: CLINIC | Age: 82
End: 2017-09-29
Payer: MEDICARE

## 2017-09-29 DIAGNOSIS — Z00.00 ENCOUNTER FOR GENERAL ADULT MEDICAL EXAMINATION W/OUT ABNORMAL FINDINGS: ICD-10-CM

## 2017-09-29 PROCEDURE — 96372 THER/PROPH/DIAG INJ SC/IM: CPT

## 2017-09-29 PROCEDURE — 85018 HEMOGLOBIN: CPT | Mod: QW

## 2017-09-29 PROCEDURE — 36415 COLL VENOUS BLD VENIPUNCTURE: CPT

## 2017-09-29 PROCEDURE — 99215 OFFICE O/P EST HI 40 MIN: CPT | Mod: 25

## 2017-09-29 RX ORDER — TORSEMIDE 10 MG/1
10 TABLET ORAL
Qty: 30 | Refills: 0 | Status: DISCONTINUED | COMMUNITY
Start: 2016-05-25 | End: 2017-09-29

## 2017-09-30 LAB
ALBUMIN SERPL ELPH-MCNC: 4.1 G/DL
ANION GAP SERPL CALC-SCNC: 21 MMOL/L
BUN SERPL-MCNC: 68 MG/DL
CALCIUM SERPL-MCNC: 9.7 MG/DL
CHLORIDE SERPL-SCNC: 102 MMOL/L
CO2 SERPL-SCNC: 21 MMOL/L
CREAT SERPL-MCNC: 3.67 MG/DL
GLUCOSE SERPL-MCNC: 145 MG/DL
PHOSPHATE SERPL-MCNC: 4.8 MG/DL
POTASSIUM SERPL-SCNC: 4.3 MMOL/L
SODIUM SERPL-SCNC: 144 MMOL/L

## 2017-10-03 ENCOUNTER — OUTPATIENT (OUTPATIENT)
Dept: OUTPATIENT SERVICES | Facility: HOSPITAL | Age: 82
LOS: 1 days | End: 2017-10-03
Payer: MEDICARE

## 2017-10-03 VITALS
HEIGHT: 65 IN | SYSTOLIC BLOOD PRESSURE: 130 MMHG | WEIGHT: 182.54 LBS | TEMPERATURE: 97 F | HEART RATE: 64 BPM | DIASTOLIC BLOOD PRESSURE: 68 MMHG | RESPIRATION RATE: 16 BRPM

## 2017-10-03 DIAGNOSIS — I77.0 ARTERIOVENOUS FISTULA, ACQUIRED: Chronic | ICD-10-CM

## 2017-10-03 DIAGNOSIS — E11.9 TYPE 2 DIABETES MELLITUS WITHOUT COMPLICATIONS: ICD-10-CM

## 2017-10-03 DIAGNOSIS — Z98.890 OTHER SPECIFIED POSTPROCEDURAL STATES: Chronic | ICD-10-CM

## 2017-10-03 DIAGNOSIS — Z98.62 PERIPHERAL VASCULAR ANGIOPLASTY STATUS: Chronic | ICD-10-CM

## 2017-10-03 DIAGNOSIS — Z01.818 ENCOUNTER FOR OTHER PREPROCEDURAL EXAMINATION: ICD-10-CM

## 2017-10-03 DIAGNOSIS — I65.29 OCCLUSION AND STENOSIS OF UNSPECIFIED CAROTID ARTERY: ICD-10-CM

## 2017-10-03 LAB
ALBUMIN SERPL ELPH-MCNC: 4.5 G/DL — SIGNIFICANT CHANGE UP (ref 3.3–5.2)
ALP SERPL-CCNC: 107 U/L — SIGNIFICANT CHANGE UP (ref 40–120)
ALT FLD-CCNC: 40 U/L — SIGNIFICANT CHANGE UP
ANION GAP SERPL CALC-SCNC: 17 MMOL/L — SIGNIFICANT CHANGE UP (ref 5–17)
APTT BLD: 31.3 SEC — SIGNIFICANT CHANGE UP (ref 27.5–37.4)
AST SERPL-CCNC: 27 U/L — SIGNIFICANT CHANGE UP
BILIRUB SERPL-MCNC: 0.4 MG/DL — SIGNIFICANT CHANGE UP (ref 0.4–2)
BLD GP AB SCN SERPL QL: SIGNIFICANT CHANGE UP
BUN SERPL-MCNC: 62 MG/DL — HIGH (ref 8–20)
CALCIUM SERPL-MCNC: 9.9 MG/DL — SIGNIFICANT CHANGE UP (ref 8.6–10.2)
CHLORIDE SERPL-SCNC: 102 MMOL/L — SIGNIFICANT CHANGE UP (ref 98–107)
CO2 SERPL-SCNC: 27 MMOL/L — SIGNIFICANT CHANGE UP (ref 22–29)
CREAT SERPL-MCNC: 3.82 MG/DL — HIGH (ref 0.5–1.3)
GLUCOSE SERPL-MCNC: 90 MG/DL — SIGNIFICANT CHANGE UP (ref 70–115)
HBA1C BLD-MCNC: 4.7 % — SIGNIFICANT CHANGE UP (ref 4–5.6)
HCT VFR BLD CALC: 34.3 % — LOW (ref 42–52)
HGB BLD-MCNC: 10.7 G/DL — LOW (ref 14–18)
INR BLD: 1.09 RATIO — SIGNIFICANT CHANGE UP (ref 0.88–1.16)
MCHC RBC-ENTMCNC: 29.1 PG — SIGNIFICANT CHANGE UP (ref 27–31)
MCHC RBC-ENTMCNC: 31.2 G/DL — LOW (ref 32–36)
MCV RBC AUTO: 93.2 FL — SIGNIFICANT CHANGE UP (ref 80–94)
PLATELET # BLD AUTO: 205 K/UL — SIGNIFICANT CHANGE UP (ref 150–400)
POTASSIUM SERPL-MCNC: 4.6 MMOL/L — SIGNIFICANT CHANGE UP (ref 3.5–5.3)
POTASSIUM SERPL-SCNC: 4.6 MMOL/L — SIGNIFICANT CHANGE UP (ref 3.5–5.3)
PROT SERPL-MCNC: 7.6 G/DL — SIGNIFICANT CHANGE UP (ref 6.6–8.7)
PROTHROM AB SERPL-ACNC: 12 SEC — SIGNIFICANT CHANGE UP (ref 9.8–12.7)
RBC # BLD: 3.68 M/UL — LOW (ref 4.6–6.2)
RBC # FLD: 17.2 % — HIGH (ref 11–15.6)
SODIUM SERPL-SCNC: 146 MMOL/L — HIGH (ref 135–145)
WBC # BLD: 10.2 K/UL — SIGNIFICANT CHANGE UP (ref 4.8–10.8)
WBC # FLD AUTO: 10.2 K/UL — SIGNIFICANT CHANGE UP (ref 4.8–10.8)

## 2017-10-03 PROCEDURE — 86850 RBC ANTIBODY SCREEN: CPT

## 2017-10-03 PROCEDURE — 85610 PROTHROMBIN TIME: CPT

## 2017-10-03 PROCEDURE — G0463: CPT

## 2017-10-03 PROCEDURE — 86901 BLOOD TYPING SEROLOGIC RH(D): CPT

## 2017-10-03 PROCEDURE — 86900 BLOOD TYPING SEROLOGIC ABO: CPT

## 2017-10-03 PROCEDURE — 85730 THROMBOPLASTIN TIME PARTIAL: CPT

## 2017-10-03 PROCEDURE — 85027 COMPLETE CBC AUTOMATED: CPT

## 2017-10-03 PROCEDURE — 36415 COLL VENOUS BLD VENIPUNCTURE: CPT

## 2017-10-03 PROCEDURE — 80053 COMPREHEN METABOLIC PANEL: CPT

## 2017-10-03 PROCEDURE — 83036 HEMOGLOBIN GLYCOSYLATED A1C: CPT

## 2017-10-03 RX ORDER — SODIUM CHLORIDE 9 MG/ML
3 INJECTION INTRAMUSCULAR; INTRAVENOUS; SUBCUTANEOUS EVERY 8 HOURS
Qty: 0 | Refills: 0 | Status: DISCONTINUED | OUTPATIENT
Start: 2017-10-10 | End: 2017-10-11

## 2017-10-03 NOTE — H&P PST ADULT - HISTORY OF PRESENT ILLNESS
84 y/o male had vascular screening right carotid doppler showed 90% right carotid blockage patient now presents for right carotid endarterectomy 82 y/o male had vascular screening right carotid doppler showed 90% right carotid stenosis patient now presents for right carotid endarterectomy

## 2017-10-03 NOTE — H&P PST ADULT - PMH
Anemia    Arrhythmia    AV block, 1st degree    CAD (coronary artery disease)    CKD (chronic kidney disease)  stage IV  DM (diabetes mellitus)    RICHARDS (dyspnea on exertion)    HTN (hypertension)    VT (ventricular tachycardia)

## 2017-10-03 NOTE — H&P PST ADULT - NEGATIVE BREAST SYMPTOMS
no breast tenderness R/no breast lump L/no breast lump R/no nipple discharge R/no nipple discharge L/no breast tenderness L

## 2017-10-03 NOTE — H&P PST ADULT - MUSCULOSKELETAL
details… detailed exam ROM intact/normal strength/no joint warmth/normal/no calf tenderness/decreased ROM due to pain/decreased ROM/no joint swelling/no joint erythema

## 2017-10-03 NOTE — PATIENT PROFILE ADULT. - FAMILY HISTORY
Father  Still living? No  Family history of cancer, Age at diagnosis: Age Unknown  Family history of lung cancer, Age at diagnosis: Age Unknown     Mother  Still living? No  Family history of premature CAD, Age at diagnosis: Age Unknown

## 2017-10-03 NOTE — H&P PST ADULT - NEUROLOGICAL DETAILS
responds to pain/superficial reflexes intact/normal strength/deep reflexes intact/sensation intact/alert and oriented x 3/no spontaneous movement/responds to verbal commands/cranial nerves intact

## 2017-10-03 NOTE — H&P PST ADULT - NEGATIVE PSYCHIATRIC SYMPTOMS
no mood swings/no agitation/no hyperactivity/no depression/no insomnia/no anxiety/no auditory hallucinations/no paranoia/no visual hallucinations/no suicidal ideation/no memory loss

## 2017-10-03 NOTE — H&P PST ADULT - NEGATIVE SKIN SYMPTOMS
no dryness/no brittle nails/no hair loss/no itching/no rash/no change in size/color of mole/no tumor/no pitted nails

## 2017-10-03 NOTE — H&P PST ADULT - NEGATIVE OPHTHALMOLOGIC SYMPTOMS
no loss of vision L/no diplopia/no photophobia/no irritation L/no discharge L/no loss of vision R/no scleral injection R/no lacrimation L/no discharge R/no lacrimation R/no scleral injection L/no blurred vision R/no pain L/no blurred vision L/no pain R/no irritation R

## 2017-10-03 NOTE — H&P PST ADULT - NEGATIVE ENMT SYMPTOMS
no dry mouth/no dysphagia/no gum bleeding/no throat pain/no sinus symptoms/no nasal congestion/no ear pain/no nasal discharge/no vertigo/no recurrent cold sores/no tinnitus/no nasal obstruction/no post-nasal discharge/no nose bleeds/no abnormal taste sensation

## 2017-10-03 NOTE — H&P PST ADULT - NEGATIVE MUSCULOSKELETAL SYMPTOMS
no arthralgia/no stiffness/no neck pain/no arm pain R/no leg pain R/no muscle cramps/no muscle weakness/no myalgia/no back pain/no joint swelling/no arm pain L

## 2017-10-03 NOTE — H&P PST ADULT - NEGATIVE GENERAL GENITOURINARY SYMPTOMS
no incontinence/no dysuria/no flank pain R/normal libido/no hematuria/no urinary hesitancy/no flank pain L/no bladder infections/no nocturia/no renal colic/no urine discoloration/normal urinary frequency/no gas in urine

## 2017-10-03 NOTE — H&P PST ADULT - GASTROINTESTINAL DETAILS
no organomegaly/no rigidity/no guarding/normal/nontender/soft/no masses palpable/no bruit/no rebound tenderness/bowel sounds normal/no distention

## 2017-10-03 NOTE — H&P PST ADULT - RS GEN PE MLT RESP DETAILS PC
no chest wall tenderness/no wheezes/no subcutaneous emphysema/airway patent/no intercostal retractions/respirations non-labored/good air movement/breath sounds equal/no rhonchi/normal/no rales/clear to auscultation bilaterally

## 2017-10-03 NOTE — H&P PST ADULT - NEGATIVE GASTROINTESTINAL SYMPTOMS
no melena/no abdominal pain/no constipation/no vomiting/no steatorrhea/no jaundice/no nausea/no change in bowel habits/no hematochezia/no hiccoughs/no diarrhea/no flatulence

## 2017-10-03 NOTE — H&P PST ADULT - NEGATIVE NEUROLOGICAL SYMPTOMS
no focal seizures/no facial palsy/no vertigo/no weakness/no generalized seizures/no headache/no loss of consciousness/no hemiparesis/no loss of sensation/no difficulty walking/no confusion/no transient paralysis/no paresthesias/no syncope/no tremors

## 2017-10-03 NOTE — H&P PST ADULT - NEGATIVE MALE-SPECIFIC SYMPTOMS
no urethral discharge/no ejaculatory dysfunction/no scrotal mass L/no undescended testicle R/no undescended testicle L/no genital sores/no erectile dysfunction/no scrotal mass R

## 2017-10-03 NOTE — H&P PST ADULT - NSANTHOSAYNRD_GEN_A_CORE
No. AIDAN screening performed.  STOP BANG Legend: 0-2 = LOW Risk; 3-4 = INTERMEDIATE Risk; 5-8 = HIGH Risk

## 2017-10-03 NOTE — PATIENT PROFILE ADULT. - LEARNING ASSESSMENT (PATIENT) ADDITIONAL COMMENTS
presurgical, surgical scrub instructions, pain management education given to patient and family - verbalized understanding

## 2017-10-03 NOTE — H&P PST ADULT - NEGATIVE GENERAL SYMPTOMS
no polydipsia/no sweating/no weight gain/no polyphagia/no polyuria/no fatigue/no anorexia/no malaise/no weight loss/no chills/no fever

## 2017-10-04 ENCOUNTER — APPOINTMENT (OUTPATIENT)
Dept: INTERNAL MEDICINE | Facility: CLINIC | Age: 82
End: 2017-10-04
Payer: MEDICARE

## 2017-10-04 PROCEDURE — 99215 OFFICE O/P EST HI 40 MIN: CPT

## 2017-10-06 ENCOUNTER — APPOINTMENT (OUTPATIENT)
Dept: NEPHROLOGY | Facility: CLINIC | Age: 82
End: 2017-10-06
Payer: MEDICARE

## 2017-10-06 PROCEDURE — 96372 THER/PROPH/DIAG INJ SC/IM: CPT

## 2017-10-06 PROCEDURE — 85018 HEMOGLOBIN: CPT | Mod: QW

## 2017-10-06 PROCEDURE — 36415 COLL VENOUS BLD VENIPUNCTURE: CPT

## 2017-10-06 PROCEDURE — 99215 OFFICE O/P EST HI 40 MIN: CPT | Mod: 25

## 2017-10-08 ENCOUNTER — RX RENEWAL (OUTPATIENT)
Age: 82
End: 2017-10-08

## 2017-10-09 ENCOUNTER — OTHER (OUTPATIENT)
Age: 82
End: 2017-10-09

## 2017-10-10 ENCOUNTER — INPATIENT (INPATIENT)
Facility: HOSPITAL | Age: 82
LOS: 2 days | Discharge: HOME | DRG: 38 | End: 2017-10-13
Attending: SURGERY | Admitting: SURGERY
Payer: MEDICARE

## 2017-10-10 VITALS — HEIGHT: 65 IN | WEIGHT: 182.54 LBS

## 2017-10-10 DIAGNOSIS — Z98.62 PERIPHERAL VASCULAR ANGIOPLASTY STATUS: Chronic | ICD-10-CM

## 2017-10-10 DIAGNOSIS — Z98.890 OTHER SPECIFIED POSTPROCEDURAL STATES: Chronic | ICD-10-CM

## 2017-10-10 DIAGNOSIS — I77.0 ARTERIOVENOUS FISTULA, ACQUIRED: Chronic | ICD-10-CM

## 2017-10-10 DIAGNOSIS — I65.29 OCCLUSION AND STENOSIS OF UNSPECIFIED CAROTID ARTERY: ICD-10-CM

## 2017-10-10 LAB
ANION GAP SERPL CALC-SCNC: 14 MMOL/L — SIGNIFICANT CHANGE UP (ref 5–17)
APTT BLD: 42.4 SEC — HIGH (ref 27.5–37.4)
BLD GP AB SCN SERPL QL: SIGNIFICANT CHANGE UP
BUN SERPL-MCNC: 63 MG/DL — HIGH (ref 8–20)
CALCIUM SERPL-MCNC: 9.4 MG/DL — SIGNIFICANT CHANGE UP (ref 8.6–10.2)
CHLORIDE SERPL-SCNC: 103 MMOL/L — SIGNIFICANT CHANGE UP (ref 98–107)
CO2 SERPL-SCNC: 27 MMOL/L — SIGNIFICANT CHANGE UP (ref 22–29)
CREAT SERPL-MCNC: 3.83 MG/DL — HIGH (ref 0.5–1.3)
GLUCOSE SERPL-MCNC: 95 MG/DL — SIGNIFICANT CHANGE UP (ref 70–115)
HBA1C BLD-MCNC: 4.7 % — SIGNIFICANT CHANGE UP (ref 4–5.6)
HCT VFR BLD CALC: 35.7 % — LOW (ref 42–52)
HEMOGLOBIN: 9.7
HEMOGLOBIN: 9.8
HGB BLD-MCNC: 10.9 G/DL — LOW (ref 14–18)
MCHC RBC-ENTMCNC: 28.5 PG — SIGNIFICANT CHANGE UP (ref 27–31)
MCHC RBC-ENTMCNC: 30.5 G/DL — LOW (ref 32–36)
MCV RBC AUTO: 93.5 FL — SIGNIFICANT CHANGE UP (ref 80–94)
PLATELET # BLD AUTO: 240 K/UL — SIGNIFICANT CHANGE UP (ref 150–400)
POTASSIUM SERPL-MCNC: 4.4 MMOL/L — SIGNIFICANT CHANGE UP (ref 3.5–5.3)
POTASSIUM SERPL-SCNC: 4.4 MMOL/L — SIGNIFICANT CHANGE UP (ref 3.5–5.3)
RBC # BLD: 3.82 M/UL — LOW (ref 4.6–6.2)
RBC # FLD: 16.4 % — HIGH (ref 11–15.6)
SODIUM SERPL-SCNC: 144 MMOL/L — SIGNIFICANT CHANGE UP (ref 135–145)
TYPE + AB SCN PNL BLD: SIGNIFICANT CHANGE UP
WBC # BLD: 9.5 K/UL — SIGNIFICANT CHANGE UP (ref 4.8–10.8)
WBC # FLD AUTO: 9.5 K/UL — SIGNIFICANT CHANGE UP (ref 4.8–10.8)

## 2017-10-10 PROCEDURE — 93010 ELECTROCARDIOGRAM REPORT: CPT

## 2017-10-10 RX ORDER — SODIUM CHLORIDE 9 MG/ML
1000 INJECTION, SOLUTION INTRAVENOUS
Qty: 0 | Refills: 0 | Status: DISCONTINUED | OUTPATIENT
Start: 2017-10-10 | End: 2017-10-11

## 2017-10-10 RX ORDER — ASPIRIN/CALCIUM CARB/MAGNESIUM 324 MG
81 TABLET ORAL DAILY
Qty: 0 | Refills: 0 | Status: DISCONTINUED | OUTPATIENT
Start: 2017-10-10 | End: 2017-10-11

## 2017-10-10 RX ORDER — HYDRALAZINE HCL 50 MG
50 TABLET ORAL EVERY 8 HOURS
Qty: 0 | Refills: 0 | Status: DISCONTINUED | OUTPATIENT
Start: 2017-10-10 | End: 2017-10-11

## 2017-10-10 RX ORDER — HEPARIN SODIUM 5000 [USP'U]/ML
800 INJECTION INTRAVENOUS; SUBCUTANEOUS
Qty: 25000 | Refills: 0 | Status: DISCONTINUED | OUTPATIENT
Start: 2017-10-10 | End: 2017-10-11

## 2017-10-10 RX ORDER — INSULIN LISPRO 100/ML
VIAL (ML) SUBCUTANEOUS
Qty: 0 | Refills: 0 | Status: DISCONTINUED | OUTPATIENT
Start: 2017-10-10 | End: 2017-10-11

## 2017-10-10 RX ORDER — DOCUSATE SODIUM 100 MG
100 CAPSULE ORAL THREE TIMES A DAY
Qty: 0 | Refills: 0 | Status: DISCONTINUED | OUTPATIENT
Start: 2017-10-10 | End: 2017-10-11

## 2017-10-10 RX ORDER — SODIUM BICARBONATE 1 MEQ/ML
650 SYRINGE (ML) INTRAVENOUS
Qty: 0 | Refills: 0 | Status: DISCONTINUED | OUTPATIENT
Start: 2017-10-10 | End: 2017-10-11

## 2017-10-10 RX ORDER — FLECAINIDE ACETATE 50 MG
100 TABLET ORAL EVERY 12 HOURS
Qty: 0 | Refills: 0 | Status: DISCONTINUED | OUTPATIENT
Start: 2017-10-10 | End: 2017-10-11

## 2017-10-10 RX ORDER — CEFAZOLIN SODIUM 1 G
2000 VIAL (EA) INJECTION ONCE
Qty: 0 | Refills: 0 | Status: COMPLETED | OUTPATIENT
Start: 2017-10-10 | End: 2017-10-10

## 2017-10-10 RX ORDER — DEXTROSE 50 % IN WATER 50 %
1 SYRINGE (ML) INTRAVENOUS ONCE
Qty: 0 | Refills: 0 | Status: DISCONTINUED | OUTPATIENT
Start: 2017-10-10 | End: 2017-10-11

## 2017-10-10 RX ORDER — DEXTROSE 50 % IN WATER 50 %
25 SYRINGE (ML) INTRAVENOUS ONCE
Qty: 0 | Refills: 0 | Status: DISCONTINUED | OUTPATIENT
Start: 2017-10-10 | End: 2017-10-11

## 2017-10-10 RX ORDER — ATORVASTATIN CALCIUM 80 MG/1
40 TABLET, FILM COATED ORAL AT BEDTIME
Qty: 0 | Refills: 0 | Status: DISCONTINUED | OUTPATIENT
Start: 2017-10-10 | End: 2017-10-11

## 2017-10-10 RX ORDER — GLUCAGON INJECTION, SOLUTION 0.5 MG/.1ML
1 INJECTION, SOLUTION SUBCUTANEOUS ONCE
Qty: 0 | Refills: 0 | Status: DISCONTINUED | OUTPATIENT
Start: 2017-10-10 | End: 2017-10-11

## 2017-10-10 RX ORDER — SENNA PLUS 8.6 MG/1
2 TABLET ORAL AT BEDTIME
Qty: 0 | Refills: 0 | Status: DISCONTINUED | OUTPATIENT
Start: 2017-10-10 | End: 2017-10-11

## 2017-10-10 RX ORDER — DEXTROSE 50 % IN WATER 50 %
12.5 SYRINGE (ML) INTRAVENOUS ONCE
Qty: 0 | Refills: 0 | Status: DISCONTINUED | OUTPATIENT
Start: 2017-10-10 | End: 2017-10-11

## 2017-10-10 RX ADMIN — Medication 100 MILLIGRAM(S): at 14:31

## 2017-10-10 RX ADMIN — Medication 100 MILLIGRAM(S): at 17:51

## 2017-10-10 RX ADMIN — Medication 650 MILLIGRAM(S): at 17:51

## 2017-10-10 RX ADMIN — SODIUM CHLORIDE 3 MILLILITER(S): 9 INJECTION INTRAMUSCULAR; INTRAVENOUS; SUBCUTANEOUS at 21:23

## 2017-10-10 RX ADMIN — HEPARIN SODIUM 8 UNIT(S)/HR: 5000 INJECTION INTRAVENOUS; SUBCUTANEOUS at 14:45

## 2017-10-10 RX ADMIN — Medication 50 MILLIGRAM(S): at 21:23

## 2017-10-10 RX ADMIN — Medication 50 MILLIGRAM(S): at 14:31

## 2017-10-10 RX ADMIN — ATORVASTATIN CALCIUM 40 MILLIGRAM(S): 80 TABLET, FILM COATED ORAL at 21:23

## 2017-10-10 RX ADMIN — SODIUM CHLORIDE 3 MILLILITER(S): 9 INJECTION INTRAMUSCULAR; INTRAVENOUS; SUBCUTANEOUS at 14:38

## 2017-10-10 RX ADMIN — Medication 100 MILLIGRAM(S): at 21:23

## 2017-10-10 NOTE — PROGRESS NOTE ADULT - SUBJECTIVE AND OBJECTIVE BOX
Patient is a 83y old  Male who presents with a chief complaint of " I have a blockage in my right carotid " (10 Oct 2017 11:10)  Pt well known to vascular surgery and is admitted prior to CEA for possible transfusion and anticoagulation  Pt without complaints    PAST MEDICAL & SURGICAL HISTORY:  Anemia  RICHARDS (dyspnea on exertion)  VT (ventricular tachycardia)  HTN (hypertension)  CAD (coronary artery disease)  CKD (chronic kidney disease): stage IV  Arrhythmia  AV block, 1st degree  DM (diabetes mellitus)  A-V fistula: left arm 5/2017  H/O angioplasty: 2013,  no  intervention  H/O left knee surgery  H/O circumcision: at  age  65    Medications at home:  Aspirin 162 mg po daily  Atorvastatin 40 mg po at bedtime  Flecainide 100 mg po bid  Nifedipine 60 mg po BID  Hydralazine 50 mg po q 8  Glipizide 5 mg po daily  Torsemede 20 mg po daily  FeSO4 325 mg po daily  NaHCO3 650 mg po bid  Nephrovite 1 tab po daily  Calcitrol 0.25 mg po daily  Vitamin C 250 mg po daily    Allergies:  Plavix (Hives)  Toprol-XL (Rash)    Physical Exam:  General: NAD, OOB in chair  Neck: No JVD at 90 degrees, soft R carotid bruit  Pulmonary: Nonlabored breathing, CTA  Cardiovascular: Normal S1, S2 with 2/6 syst M  Abdominal: soft, NT/ND  Extremities: L AVF + bruit, + thrill. BLE with 2+ edema  Pulses:   Right:                                                                          Left:  DP [ ]2+ [x ]1+ [ ]doppler                                                DP [ ]2+ [x ]1+ [ ]doppler  PT[ ]2+ [ ]1+ [x ]doppler                                                  PT [ ]2+ [ ]1+ [x ]doppler      LABS:                        10.9   9.5   )-----------( 240      ( 10 Oct 2017 11:46 )             35.7     10-10    144  |  103  |  63.0<H>  ----------------------------<  95  4.4   |  27.0  |  3.83<H>    Ca    9.4      10 Oct 2017 11:45        CAPILLARY BLOOD GLUCOSE        Radiology and Additional Studies:    Assessment and Plan: 83y Male with stenosis of carotid artery  No need for preop transfusion  Will start Heparin gtt in preparation for OR tomorrow  NPO p MN for R CEA

## 2017-10-11 ENCOUNTER — RESULT REVIEW (OUTPATIENT)
Age: 82
End: 2017-10-11

## 2017-10-11 LAB
ALBUMIN SERPL ELPH-MCNC: 3.4 G/DL — SIGNIFICANT CHANGE UP (ref 3.3–5.2)
ALP SERPL-CCNC: 82 U/L — SIGNIFICANT CHANGE UP (ref 40–120)
ALT FLD-CCNC: 24 U/L — SIGNIFICANT CHANGE UP
ANION GAP SERPL CALC-SCNC: 17 MMOL/L — SIGNIFICANT CHANGE UP (ref 5–17)
ANISOCYTOSIS BLD QL: SLIGHT — SIGNIFICANT CHANGE UP
AST SERPL-CCNC: 19 U/L — SIGNIFICANT CHANGE UP
BILIRUB SERPL-MCNC: 0.2 MG/DL — LOW (ref 0.4–2)
BUN SERPL-MCNC: 64 MG/DL — HIGH (ref 8–20)
CALCIUM SERPL-MCNC: 8.2 MG/DL — LOW (ref 8.6–10.2)
CHLORIDE SERPL-SCNC: 103 MMOL/L — SIGNIFICANT CHANGE UP (ref 98–107)
CO2 SERPL-SCNC: 20 MMOL/L — LOW (ref 22–29)
CREAT SERPL-MCNC: 3.77 MG/DL — HIGH (ref 0.5–1.3)
ELLIPTOCYTES BLD QL SMEAR: SLIGHT — SIGNIFICANT CHANGE UP
GLUCOSE SERPL-MCNC: 203 MG/DL — HIGH (ref 70–115)
HCT VFR BLD CALC: 30.6 % — LOW (ref 42–52)
HGB BLD-MCNC: 9.6 G/DL — LOW (ref 14–18)
LYMPHOCYTES # BLD AUTO: 2 % — LOW (ref 20–55)
MACROCYTES BLD QL: SLIGHT — SIGNIFICANT CHANGE UP
MAGNESIUM SERPL-MCNC: 2.3 MG/DL — SIGNIFICANT CHANGE UP (ref 1.6–2.6)
MCHC RBC-ENTMCNC: 28.7 PG — SIGNIFICANT CHANGE UP (ref 27–31)
MCHC RBC-ENTMCNC: 31.4 G/DL — LOW (ref 32–36)
MCV RBC AUTO: 91.3 FL — SIGNIFICANT CHANGE UP (ref 80–94)
MICROCYTES BLD QL: SLIGHT — SIGNIFICANT CHANGE UP
MONOCYTES NFR BLD AUTO: 1 % — LOW (ref 3–10)
NEUTROPHILS NFR BLD AUTO: 97 % — HIGH (ref 37–73)
OVALOCYTES BLD QL SMEAR: SLIGHT — SIGNIFICANT CHANGE UP
PHOSPHATE SERPL-MCNC: 5.2 MG/DL — HIGH (ref 2.4–4.7)
PLAT MORPH BLD: NORMAL — SIGNIFICANT CHANGE UP
PLATELET # BLD AUTO: 221 K/UL — SIGNIFICANT CHANGE UP (ref 150–400)
POIKILOCYTOSIS BLD QL AUTO: SLIGHT — SIGNIFICANT CHANGE UP
POTASSIUM SERPL-MCNC: 4.8 MMOL/L — SIGNIFICANT CHANGE UP (ref 3.5–5.3)
POTASSIUM SERPL-SCNC: 4.8 MMOL/L — SIGNIFICANT CHANGE UP (ref 3.5–5.3)
PROT SERPL-MCNC: 6.1 G/DL — LOW (ref 6.6–8.7)
RBC # BLD: 3.35 M/UL — LOW (ref 4.6–6.2)
RBC # FLD: 16.1 % — HIGH (ref 11–15.6)
RBC BLD AUTO: ABNORMAL
SODIUM SERPL-SCNC: 140 MMOL/L — SIGNIFICANT CHANGE UP (ref 135–145)
WBC # BLD: 9.3 K/UL — SIGNIFICANT CHANGE UP (ref 4.8–10.8)
WBC # FLD AUTO: 9.3 K/UL — SIGNIFICANT CHANGE UP (ref 4.8–10.8)

## 2017-10-11 PROCEDURE — 84100 ASSAY OF PHOSPHORUS: CPT

## 2017-10-11 PROCEDURE — 82306 VITAMIN D 25 HYDROXY: CPT

## 2017-10-11 PROCEDURE — 35301 RECHANNELING OF ARTERY: CPT | Mod: RT

## 2017-10-11 PROCEDURE — 84484 ASSAY OF TROPONIN QUANT: CPT

## 2017-10-11 PROCEDURE — 96374 THER/PROPH/DIAG INJ IV PUSH: CPT

## 2017-10-11 PROCEDURE — 86901 BLOOD TYPING SEROLOGIC RH(D): CPT

## 2017-10-11 PROCEDURE — 71045 X-RAY EXAM CHEST 1 VIEW: CPT

## 2017-10-11 PROCEDURE — 85027 COMPLETE CBC AUTOMATED: CPT

## 2017-10-11 PROCEDURE — 84132 ASSAY OF SERUM POTASSIUM: CPT

## 2017-10-11 PROCEDURE — 83540 ASSAY OF IRON: CPT

## 2017-10-11 PROCEDURE — 82310 ASSAY OF CALCIUM: CPT

## 2017-10-11 PROCEDURE — G0365: CPT

## 2017-10-11 PROCEDURE — 80069 RENAL FUNCTION PANEL: CPT

## 2017-10-11 PROCEDURE — 85045 AUTOMATED RETICULOCYTE COUNT: CPT

## 2017-10-11 PROCEDURE — 83970 ASSAY OF PARATHORMONE: CPT

## 2017-10-11 PROCEDURE — 82728 ASSAY OF FERRITIN: CPT

## 2017-10-11 PROCEDURE — 88304 TISSUE EXAM BY PATHOLOGIST: CPT | Mod: 26

## 2017-10-11 PROCEDURE — 83735 ASSAY OF MAGNESIUM: CPT

## 2017-10-11 PROCEDURE — 35301 RECHANNELING OF ARTERY: CPT | Mod: AS,RT

## 2017-10-11 PROCEDURE — 83880 ASSAY OF NATRIURETIC PEPTIDE: CPT

## 2017-10-11 PROCEDURE — 83036 HEMOGLOBIN GLYCOSYLATED A1C: CPT

## 2017-10-11 PROCEDURE — 93306 TTE W/DOPPLER COMPLETE: CPT

## 2017-10-11 PROCEDURE — 80048 BASIC METABOLIC PNL TOTAL CA: CPT

## 2017-10-11 PROCEDURE — 86850 RBC ANTIBODY SCREEN: CPT

## 2017-10-11 PROCEDURE — 81001 URINALYSIS AUTO W/SCOPE: CPT

## 2017-10-11 PROCEDURE — 83550 IRON BINDING TEST: CPT

## 2017-10-11 PROCEDURE — 84466 ASSAY OF TRANSFERRIN: CPT

## 2017-10-11 PROCEDURE — 80053 COMPREHEN METABOLIC PANEL: CPT

## 2017-10-11 PROCEDURE — 85730 THROMBOPLASTIN TIME PARTIAL: CPT

## 2017-10-11 PROCEDURE — 88311 DECALCIFY TISSUE: CPT | Mod: 26

## 2017-10-11 PROCEDURE — 35301 RECHANNELING OF ARTERY: CPT | Mod: 80,RT

## 2017-10-11 PROCEDURE — 73030 X-RAY EXAM OF SHOULDER: CPT

## 2017-10-11 PROCEDURE — 85610 PROTHROMBIN TIME: CPT

## 2017-10-11 PROCEDURE — 99285 EMERGENCY DEPT VISIT HI MDM: CPT | Mod: 25

## 2017-10-11 PROCEDURE — 93005 ELECTROCARDIOGRAM TRACING: CPT

## 2017-10-11 PROCEDURE — 36415 COLL VENOUS BLD VENIPUNCTURE: CPT

## 2017-10-11 PROCEDURE — 86900 BLOOD TYPING SEROLOGIC ABO: CPT

## 2017-10-11 RX ORDER — HYDRALAZINE HCL 50 MG
10 TABLET ORAL
Qty: 0 | Refills: 0 | Status: DISCONTINUED | OUTPATIENT
Start: 2017-10-11 | End: 2017-10-13

## 2017-10-11 RX ORDER — SODIUM CHLORIDE 9 MG/ML
1000 INJECTION INTRAMUSCULAR; INTRAVENOUS; SUBCUTANEOUS
Qty: 0 | Refills: 0 | Status: DISCONTINUED | OUTPATIENT
Start: 2017-10-11 | End: 2017-10-12

## 2017-10-11 RX ORDER — ONDANSETRON 8 MG/1
4 TABLET, FILM COATED ORAL EVERY 6 HOURS
Qty: 0 | Refills: 0 | Status: DISCONTINUED | OUTPATIENT
Start: 2017-10-11 | End: 2017-10-13

## 2017-10-11 RX ORDER — DEXTROSE 50 % IN WATER 50 %
12.5 SYRINGE (ML) INTRAVENOUS ONCE
Qty: 0 | Refills: 0 | Status: DISCONTINUED | OUTPATIENT
Start: 2017-10-11 | End: 2017-10-13

## 2017-10-11 RX ORDER — FLECAINIDE ACETATE 50 MG
100 TABLET ORAL
Qty: 0 | Refills: 0 | Status: DISCONTINUED | OUTPATIENT
Start: 2017-10-11 | End: 2017-10-13

## 2017-10-11 RX ORDER — ONDANSETRON 8 MG/1
4 TABLET, FILM COATED ORAL ONCE
Qty: 0 | Refills: 0 | Status: DISCONTINUED | OUTPATIENT
Start: 2017-10-11 | End: 2017-10-11

## 2017-10-11 RX ORDER — FENTANYL CITRATE 50 UG/ML
25 INJECTION INTRAVENOUS
Qty: 0 | Refills: 0 | Status: DISCONTINUED | OUTPATIENT
Start: 2017-10-11 | End: 2017-10-11

## 2017-10-11 RX ORDER — HYDRALAZINE HCL 50 MG
10 TABLET ORAL
Qty: 0 | Refills: 0 | Status: DISCONTINUED | OUTPATIENT
Start: 2017-10-11 | End: 2017-10-11

## 2017-10-11 RX ORDER — DEXTROSE 50 % IN WATER 50 %
25 SYRINGE (ML) INTRAVENOUS ONCE
Qty: 0 | Refills: 0 | Status: DISCONTINUED | OUTPATIENT
Start: 2017-10-11 | End: 2017-10-13

## 2017-10-11 RX ORDER — SENNA PLUS 8.6 MG/1
2 TABLET ORAL AT BEDTIME
Qty: 0 | Refills: 0 | Status: DISCONTINUED | OUTPATIENT
Start: 2017-10-11 | End: 2017-10-13

## 2017-10-11 RX ORDER — HEPARIN SODIUM 5000 [USP'U]/ML
1000 INJECTION INTRAVENOUS; SUBCUTANEOUS
Qty: 25000 | Refills: 0 | Status: DISCONTINUED | OUTPATIENT
Start: 2017-10-11 | End: 2017-10-12

## 2017-10-11 RX ORDER — ACETAMINOPHEN 500 MG
650 TABLET ORAL EVERY 6 HOURS
Qty: 0 | Refills: 0 | Status: DISCONTINUED | OUTPATIENT
Start: 2017-10-11 | End: 2017-10-13

## 2017-10-11 RX ORDER — HYDRALAZINE HCL 50 MG
5 TABLET ORAL
Qty: 0 | Refills: 0 | Status: DISCONTINUED | OUTPATIENT
Start: 2017-10-11 | End: 2017-10-11

## 2017-10-11 RX ORDER — DOCUSATE SODIUM 100 MG
100 CAPSULE ORAL THREE TIMES A DAY
Qty: 0 | Refills: 0 | Status: DISCONTINUED | OUTPATIENT
Start: 2017-10-11 | End: 2017-10-11

## 2017-10-11 RX ORDER — OXYCODONE HYDROCHLORIDE 5 MG/1
5 TABLET ORAL EVERY 4 HOURS
Qty: 0 | Refills: 0 | Status: DISCONTINUED | OUTPATIENT
Start: 2017-10-11 | End: 2017-10-13

## 2017-10-11 RX ORDER — SODIUM CHLORIDE 9 MG/ML
1000 INJECTION, SOLUTION INTRAVENOUS
Qty: 0 | Refills: 0 | Status: DISCONTINUED | OUTPATIENT
Start: 2017-10-11 | End: 2017-10-13

## 2017-10-11 RX ORDER — ATORVASTATIN CALCIUM 80 MG/1
40 TABLET, FILM COATED ORAL AT BEDTIME
Qty: 0 | Refills: 0 | Status: DISCONTINUED | OUTPATIENT
Start: 2017-10-11 | End: 2017-10-13

## 2017-10-11 RX ORDER — HYDRALAZINE HCL 50 MG
50 TABLET ORAL EVERY 12 HOURS
Qty: 0 | Refills: 0 | Status: DISCONTINUED | OUTPATIENT
Start: 2017-10-11 | End: 2017-10-11

## 2017-10-11 RX ORDER — SODIUM CHLORIDE 9 MG/ML
1000 INJECTION, SOLUTION INTRAVENOUS
Qty: 0 | Refills: 0 | Status: DISCONTINUED | OUTPATIENT
Start: 2017-10-11 | End: 2017-10-11

## 2017-10-11 RX ORDER — BENZOCAINE AND MENTHOL 5; 1 G/100ML; G/100ML
1 LIQUID ORAL EVERY 4 HOURS
Qty: 0 | Refills: 0 | Status: DISCONTINUED | OUTPATIENT
Start: 2017-10-11 | End: 2017-10-13

## 2017-10-11 RX ORDER — DOCUSATE SODIUM 100 MG
100 CAPSULE ORAL
Qty: 0 | Refills: 0 | Status: DISCONTINUED | OUTPATIENT
Start: 2017-10-11 | End: 2017-10-13

## 2017-10-11 RX ORDER — GLUCAGON INJECTION, SOLUTION 0.5 MG/.1ML
1 INJECTION, SOLUTION SUBCUTANEOUS ONCE
Qty: 0 | Refills: 0 | Status: DISCONTINUED | OUTPATIENT
Start: 2017-10-11 | End: 2017-10-13

## 2017-10-11 RX ORDER — DEXTROSE 50 % IN WATER 50 %
1 SYRINGE (ML) INTRAVENOUS ONCE
Qty: 0 | Refills: 0 | Status: DISCONTINUED | OUTPATIENT
Start: 2017-10-11 | End: 2017-10-13

## 2017-10-11 RX ORDER — HYDROMORPHONE HYDROCHLORIDE 2 MG/ML
1 INJECTION INTRAMUSCULAR; INTRAVENOUS; SUBCUTANEOUS EVERY 4 HOURS
Qty: 0 | Refills: 0 | Status: DISCONTINUED | OUTPATIENT
Start: 2017-10-11 | End: 2017-10-13

## 2017-10-11 RX ORDER — CEFAZOLIN SODIUM 1 G
2000 VIAL (EA) INJECTION
Qty: 0 | Refills: 0 | Status: COMPLETED | OUTPATIENT
Start: 2017-10-11 | End: 2017-10-12

## 2017-10-11 RX ORDER — OXYCODONE HYDROCHLORIDE 5 MG/1
10 TABLET ORAL EVERY 4 HOURS
Qty: 0 | Refills: 0 | Status: DISCONTINUED | OUTPATIENT
Start: 2017-10-11 | End: 2017-10-13

## 2017-10-11 RX ORDER — INSULIN LISPRO 100/ML
VIAL (ML) SUBCUTANEOUS
Qty: 0 | Refills: 0 | Status: DISCONTINUED | OUTPATIENT
Start: 2017-10-11 | End: 2017-10-13

## 2017-10-11 RX ORDER — ASPIRIN/CALCIUM CARB/MAGNESIUM 324 MG
81 TABLET ORAL DAILY
Qty: 0 | Refills: 0 | Status: DISCONTINUED | OUTPATIENT
Start: 2017-10-11 | End: 2017-10-12

## 2017-10-11 RX ADMIN — Medication 100 MILLIGRAM(S): at 17:55

## 2017-10-11 RX ADMIN — Medication 100 MILLIGRAM(S): at 18:28

## 2017-10-11 RX ADMIN — HEPARIN SODIUM 10 UNIT(S)/HR: 5000 INJECTION INTRAVENOUS; SUBCUTANEOUS at 20:52

## 2017-10-11 RX ADMIN — SENNA PLUS 2 TABLET(S): 8.6 TABLET ORAL at 22:00

## 2017-10-11 RX ADMIN — Medication 650 MILLIGRAM(S): at 05:56

## 2017-10-11 RX ADMIN — Medication 5 MILLIGRAM(S): at 20:53

## 2017-10-11 RX ADMIN — Medication 100 MILLIGRAM(S): at 05:56

## 2017-10-11 RX ADMIN — Medication 2: at 17:54

## 2017-10-11 RX ADMIN — ATORVASTATIN CALCIUM 40 MILLIGRAM(S): 80 TABLET, FILM COATED ORAL at 22:00

## 2017-10-11 RX ADMIN — SODIUM CHLORIDE 3 MILLILITER(S): 9 INJECTION INTRAMUSCULAR; INTRAVENOUS; SUBCUTANEOUS at 05:50

## 2017-10-11 RX ADMIN — Medication 50 MILLIGRAM(S): at 05:56

## 2017-10-11 RX ADMIN — BENZOCAINE AND MENTHOL 1 LOZENGE: 5; 1 LIQUID ORAL at 20:53

## 2017-10-11 RX ADMIN — Medication 100 MILLIGRAM(S): at 15:56

## 2017-10-11 NOTE — BRIEF OPERATIVE NOTE - PROCEDURE
<<-----Click on this checkbox to enter Procedure Carotid endarterectomy  10/11/2017  right  Active  CLAMBERT

## 2017-10-11 NOTE — PROGRESS NOTE ADULT - SUBJECTIVE AND OBJECTIVE BOX
STATUS POST:  Right CEA    POST OPERATIVE DAY #: 0    SUBJECTIVE: Pt seen and examined at bedside. Patient resting comfortably with family at bedside. Patient states he has no pain and generally feels well. Patient states his throat is sore but denies difficulty breathing, swallowing. Denies CP, HA, nausea, fever, visual changes.  SOB:  [ ] YES [X ] NO  Chest Discomfort: [ ] YES [X ] NO    Nausea: [ ] YES [X ] NO           Vomiting: [ ] YES [X ] NO  Flatus: [ ] YES [X ] NO             Bowel Movement: [ ] YES [X ] NO  Diarrhea: [ ] YES [X ] NO         Pain (0-10):            0  Pain Control Adequate: [ ] YES [X ] NO  Camarena: discontinued awaiting void  NGT: NA    Vital Signs Last 24 Hrs  T(C): 37.5 (11 Oct 2017 21:11), Max: 37.5 (11 Oct 2017 21:11)  T(F): 99.5 (11 Oct 2017 21:11), Max: 99.5 (11 Oct 2017 21:11)  HR: 57 (11 Oct 2017 20:00) (52 - 80)  BP: 160/67 (11 Oct 2017 19:00) (113/65 - 175/77)  BP(mean): 96 (11 Oct 2017 19:00) (83 - 130)  RR: 25 (11 Oct 2017 20:00) (12 - 25)  SpO2: 100% (11 Oct 2017 20:00) (95% - 100%)  I&O's Summary    10 Oct 2017 07:01  -  11 Oct 2017 07:00  --------------------------------------------------------  IN: 200 mL / OUT: 0 mL / NET: 200 mL    11 Oct 2017 07:01  -  11 Oct 2017 21:36  --------------------------------------------------------  IN: 1600 mL / OUT: 590 mL / NET: 1010 mL      I&O's Detail    10 Oct 2017 07:01  -  11 Oct 2017 07:00  --------------------------------------------------------  IN:    heparin Infusion: 80 mL    Oral Fluid: 120 mL  Total IN: 200 mL    OUT:  Total OUT: 0 mL    Total NET: 200 mL      11 Oct 2017 07:01  -  11 Oct 2017 21:36  --------------------------------------------------------  IN:    lactated ringers.: 800 mL    Oral Fluid: 300 mL    sodium chloride 0.9%.: 500 mL  Total IN: 1600 mL    OUT:    Estimated Blood Loss: 200 mL    Indwelling Catheter - Urethral: 390 mL  Total OUT: 590 mL    Total NET: 1010 mL        LABS:                        9.6    9.3   )-----------( 221      ( 11 Oct 2017 18:20 )             30.6     10-11    140  |  103  |  64.0<H>  ----------------------------<  203<H>  4.8   |  20.0<L>  |  3.77<H>    Ca    8.2<L>      11 Oct 2017 18:20  Phos  5.2     10-11  Mg     2.3     10-11    TPro  6.1<L>  /  Alb  3.4  /  TBili  0.2<L>  /  DBili  x   /  AST  19  /  ALT  24  /  AlkPhos  82  10-11    PTT - ( 10 Oct 2017 23:14 )  PTT:42.4 sec        PHYSICAL EXAM:      Constitutional: NAD, alert and oriented     Neck: supple, no swelling, no tenderness, dressing in place CDI MARJORIE in place with small amount of s/s drainage in bulb    Gastrointestinal: soft NT, ND +BS    Extremities: warm, FROM without pain, normal strength BL, SILT    Vascular: upper/lower 2+ throughout     Neurological: grossly intact       A/P: 83y Male POD #0 s/p R CEA doing well post op    - Diet: ADAT  - Activity: bedrest until evaluated in AM  - Labs: follow up in AM  - Pain medication  - DVT ppx

## 2017-10-11 NOTE — PROGRESS NOTE ADULT - SUBJECTIVE AND OBJECTIVE BOX
Patient is a 83y old  Male who presents with a chief complaint of " I have a blockage in my   right carotid "   He is scheduled to have a RIGHT CAROTID ENDARTERECTOMY (10 Oct 2017 11:10)      PAST MEDICAL HISTORY:  Anemia  RICHARDS (dyspnea on exertion)  VT (ventricular tachycardia)  HTN (hypertension)  CAD (coronary artery disease)  CKD (chronic kidney disease)  Diabetes      PAST SURGICAL HISTORY:  A-V fistula  Angioplasty  Left knee surgery  Circumcision      MEDICATIONS  (STANDING):  aspirin enteric coated 81 milliGRAM(s) Oral daily  atorvastatin 40 milliGRAM(s) Oral at bedtime  dextrose 5%. 1000 milliLiter(s) (50 mL/Hr) IV Continuous <Continuous>  dextrose 50% Injectable 12.5 Gram(s) IV Push once  dextrose 50% Injectable 25 Gram(s) IV Push once  dextrose 50% Injectable 25 Gram(s) IV Push once  docusate sodium 100 milliGRAM(s) Oral three times a day  flecainide 100 milliGRAM(s) Oral every 12 hours  heparin  Infusion 800 Unit(s)/Hr (8 mL/Hr) IV Continuous <Continuous>  hydrALAZINE 50 milliGRAM(s) Oral every 8 hours  insulin lispro (HumaLOG) corrective regimen sliding scale   SubCutaneous Before meals and at bedtime  sodium bicarbonate 650 milliGRAM(s) Oral two times a day before meals  sodium chloride 0.9% lock flush 3 milliLiter(s) IV Push every 8 hours    MEDICATIONS  (PRN):  dextrose Gel 1 Dose(s) Oral once PRN Blood Glucose LESS THAN 70 milliGRAM(s)/deciLiter  glucagon  Injectable 1 milliGRAM(s) IntraMuscular once PRN Glucose <70 milliGRAM(s)/deciLiter  senna 2 Tablet(s) Oral at bedtime PRN Constipation      Allergies:    Plavix (Hives)                   Toprol-XL (Rash    SOCIAL HISTORY:  The patient does not smoke, drink alcohol or use illicit drugs.                          10.9   9.5   )-----------( 240      ( 10 Oct 2017 11:46 )             35.7       PT/INR - ( 03 Oct 2017 13:54 )   PT: 12.0 sec;   INR: 1.09 ratio         PTT - ( 10 Oct 2017 23:14 )  PTT:42.4 sec    10-10    144  |  103  |  63.0<H>  ----------------------------<  95  4.4   |  27.0  |  3.83<H>    Ca    9.4      10 Oct 2017 11:45    Height (cm): 165.1 (10-10 @ 12:36)  Weight (kg): 82.8 (10-10 @ 12:36)  BMI (kg/m2): 30.4 (10-10 @ 12:36)    EKG  10/10/2017  Sinus rhythm with sinus arrhythmia with 1st degree A-V block  Left axis deviation  Nonspecific ST abnormality  Prolonged QT  Abnormal ECG    MRA of the Neck - 7/27/201  Impression: 81% stenosis involving the proximal right internal carotid   artery and a 45% stenosis involving the proximal left internal carotid    ASA # = 3      Mallampati # = 2  (He has full dentures)

## 2017-10-12 LAB
ANION GAP SERPL CALC-SCNC: 13 MMOL/L — SIGNIFICANT CHANGE UP (ref 5–17)
APTT BLD: 45.3 SEC — HIGH (ref 27.5–37.4)
BASOPHILS # BLD AUTO: 0 K/UL — SIGNIFICANT CHANGE UP (ref 0–0.2)
BASOPHILS NFR BLD AUTO: 0.4 % — SIGNIFICANT CHANGE UP (ref 0–2)
BUN SERPL-MCNC: 61 MG/DL — HIGH (ref 8–20)
CALCIUM SERPL-MCNC: 7.9 MG/DL — LOW (ref 8.6–10.2)
CHLORIDE SERPL-SCNC: 108 MMOL/L — HIGH (ref 98–107)
CO2 SERPL-SCNC: 21 MMOL/L — LOW (ref 22–29)
CREAT SERPL-MCNC: 3.74 MG/DL — HIGH (ref 0.5–1.3)
EOSINOPHIL # BLD AUTO: 0 K/UL — SIGNIFICANT CHANGE UP (ref 0–0.5)
EOSINOPHIL NFR BLD AUTO: 0.2 % — SIGNIFICANT CHANGE UP (ref 0–5)
GAS PNL BLDA: SIGNIFICANT CHANGE UP
GLUCOSE SERPL-MCNC: 155 MG/DL — HIGH (ref 70–115)
HCT VFR BLD CALC: 27.9 % — LOW (ref 42–52)
HGB BLD-MCNC: 8.7 G/DL — LOW (ref 14–18)
LYMPHOCYTES # BLD AUTO: 0.7 K/UL — LOW (ref 1–4.8)
LYMPHOCYTES # BLD AUTO: 7.5 % — LOW (ref 20–55)
MAGNESIUM SERPL-MCNC: 2.2 MG/DL — SIGNIFICANT CHANGE UP (ref 1.6–2.6)
MCHC RBC-ENTMCNC: 28.4 PG — SIGNIFICANT CHANGE UP (ref 27–31)
MCHC RBC-ENTMCNC: 31.2 G/DL — LOW (ref 32–36)
MCV RBC AUTO: 91.2 FL — SIGNIFICANT CHANGE UP (ref 80–94)
MONOCYTES # BLD AUTO: 1 K/UL — HIGH (ref 0–0.8)
MONOCYTES NFR BLD AUTO: 10.8 % — HIGH (ref 3–10)
NEUTROPHILS # BLD AUTO: 7.8 K/UL — SIGNIFICANT CHANGE UP (ref 1.8–8)
NEUTROPHILS NFR BLD AUTO: 80.8 % — HIGH (ref 37–73)
PHOSPHATE SERPL-MCNC: 5 MG/DL — HIGH (ref 2.4–4.7)
PLATELET # BLD AUTO: 187 K/UL — SIGNIFICANT CHANGE UP (ref 150–400)
POTASSIUM SERPL-MCNC: 4.7 MMOL/L — SIGNIFICANT CHANGE UP (ref 3.5–5.3)
POTASSIUM SERPL-SCNC: 4.7 MMOL/L — SIGNIFICANT CHANGE UP (ref 3.5–5.3)
RBC # BLD: 3.06 M/UL — LOW (ref 4.6–6.2)
RBC # FLD: 16.2 % — HIGH (ref 11–15.6)
SODIUM SERPL-SCNC: 142 MMOL/L — SIGNIFICANT CHANGE UP (ref 135–145)
WBC # BLD: 9.7 K/UL — SIGNIFICANT CHANGE UP (ref 4.8–10.8)
WBC # FLD AUTO: 9.7 K/UL — SIGNIFICANT CHANGE UP (ref 4.8–10.8)

## 2017-10-12 PROCEDURE — 71010: CPT | Mod: 26

## 2017-10-12 PROCEDURE — 99223 1ST HOSP IP/OBS HIGH 75: CPT

## 2017-10-12 RX ORDER — ERYTHROPOIETIN 10000 [IU]/ML
8000 INJECTION, SOLUTION INTRAVENOUS; SUBCUTANEOUS
Qty: 0 | Refills: 0 | Status: DISCONTINUED | OUTPATIENT
Start: 2017-10-12 | End: 2017-10-12

## 2017-10-12 RX ORDER — ERYTHROPOIETIN 10000 [IU]/ML
20000 INJECTION, SOLUTION INTRAVENOUS; SUBCUTANEOUS ONCE
Qty: 0 | Refills: 0 | Status: COMPLETED | OUTPATIENT
Start: 2017-10-13 | End: 2017-10-13

## 2017-10-12 RX ORDER — ASPIRIN/CALCIUM CARB/MAGNESIUM 324 MG
162 TABLET ORAL DAILY
Qty: 0 | Refills: 0 | Status: DISCONTINUED | OUTPATIENT
Start: 2017-10-12 | End: 2017-10-13

## 2017-10-12 RX ORDER — TAMSULOSIN HYDROCHLORIDE 0.4 MG/1
0.4 CAPSULE ORAL DAILY
Qty: 0 | Refills: 0 | Status: DISCONTINUED | OUTPATIENT
Start: 2017-10-12 | End: 2017-10-13

## 2017-10-12 RX ORDER — HYDRALAZINE HCL 50 MG
25 TABLET ORAL EVERY 8 HOURS
Qty: 0 | Refills: 0 | Status: DISCONTINUED | OUTPATIENT
Start: 2017-10-12 | End: 2017-10-13

## 2017-10-12 RX ADMIN — Medication 162 MILLIGRAM(S): at 11:22

## 2017-10-12 RX ADMIN — Medication 100 MILLIGRAM(S): at 06:08

## 2017-10-12 RX ADMIN — ATORVASTATIN CALCIUM 40 MILLIGRAM(S): 80 TABLET, FILM COATED ORAL at 21:39

## 2017-10-12 RX ADMIN — Medication 100 MILLIGRAM(S): at 16:54

## 2017-10-12 RX ADMIN — Medication 25 MILLIGRAM(S): at 21:39

## 2017-10-12 RX ADMIN — Medication 100 MILLIGRAM(S): at 02:01

## 2017-10-12 RX ADMIN — BENZOCAINE AND MENTHOL 1 LOZENGE: 5; 1 LIQUID ORAL at 02:19

## 2017-10-12 RX ADMIN — HEPARIN SODIUM 11 UNIT(S)/HR: 5000 INJECTION INTRAVENOUS; SUBCUTANEOUS at 07:38

## 2017-10-12 RX ADMIN — Medication 25 MILLIGRAM(S): at 15:05

## 2017-10-12 RX ADMIN — ERYTHROPOIETIN 8000 UNIT(S): 10000 INJECTION, SOLUTION INTRAVENOUS; SUBCUTANEOUS at 15:05

## 2017-10-12 RX ADMIN — Medication 1: at 16:54

## 2017-10-12 RX ADMIN — TAMSULOSIN HYDROCHLORIDE 0.4 MILLIGRAM(S): 0.4 CAPSULE ORAL at 11:22

## 2017-10-12 RX ADMIN — SENNA PLUS 2 TABLET(S): 8.6 TABLET ORAL at 21:39

## 2017-10-12 RX ADMIN — Medication 1: at 11:22

## 2017-10-12 NOTE — PROGRESS NOTE ADULT - SUBJECTIVE AND OBJECTIVE BOX
INTERVAL HPI/OVERNIGHT EVENTS/SUBJECTIVE: NO over night     ICU Vital Signs Last 24 Hrs  T(C): 36.7 (12 Oct 2017 08:00), Max: 37.5 (11 Oct 2017 21:11)  T(F): 98 (12 Oct 2017 08:00), Max: 99.5 (11 Oct 2017 21:11)  HR: 71 (12 Oct 2017 11:00) (50 - 80)  BP: 114/84 (12 Oct 2017 11:00) (113/65 - 183/80)  BP(mean): 95 (12 Oct 2017 11:00) (83 - 130)  ABP: 144/45 (12 Oct 2017 10:00) (133/37 - 186/53)  ABP(mean): 77 (12 Oct 2017 10:00) (70 - 98)  RR: 14 (12 Oct 2017 09:00) (11 - 39)  SpO2: 99% (12 Oct 2017 11:00) (95% - 100%)      I&O's Detail    11 Oct 2017 07:01  -  12 Oct 2017 07:00  --------------------------------------------------------  IN:    heparin Infusion: 91 mL    lactated ringers.: 800 mL    Oral Fluid: 420 mL    sodium chloride 0.9%: 1600 mL  Total IN: 2911 mL    OUT:    Estimated Blood Loss: 200 mL    Indwelling Catheter - Urethral: 390 mL    Intermittent Catheterization - Urethral: 450 mL  Total OUT: 1040 mL    Total NET: 1871 mL      12 Oct 2017 07:01  -  12 Oct 2017 13:46  --------------------------------------------------------  IN:    heparin Infusion: 11 mL    Oral Fluid: 490 mL    sodium chloride 0.9%: 100 mL  Total IN: 601 mL    OUT:    Voided: 100 mL  Total OUT: 100 mL    Total NET: 501 mL            ABG - ( 12 Oct 2017 02:45 )  pH: 7.36  /  pCO2: 40    /  pO2: 102   / HCO3: 22    / Base Excess: -2.4  /  SaO2: 97                  MEDICATIONS  (STANDING):  aspirin enteric coated 162 milliGRAM(s) Oral daily  atorvastatin 40 milliGRAM(s) Oral at bedtime  dextrose 5%. 1000 milliLiter(s) (50 mL/Hr) IV Continuous <Continuous>  dextrose 50% Injectable 12.5 Gram(s) IV Push once  dextrose 50% Injectable 25 Gram(s) IV Push once  dextrose 50% Injectable 25 Gram(s) IV Push once  docusate sodium 100 milliGRAM(s) Oral two times a day  epoetin juan Injectable 8000 Unit(s) SubCutaneous <User Schedule>  flecainide 100 milliGRAM(s) Oral two times a day  hydrALAZINE 25 milliGRAM(s) Oral every 8 hours  insulin lispro (HumaLOG) corrective regimen sliding scale   SubCutaneous three times a day before meals  senna 2 Tablet(s) Oral at bedtime  tamsulosin 0.4 milliGRAM(s) Oral daily    MEDICATIONS  (PRN):  acetaminophen   Tablet 650 milliGRAM(s) Oral every 6 hours PRN For Temp greater than 38 C (100.4 F)  acetaminophen   Tablet. 650 milliGRAM(s) Oral every 6 hours PRN Mild Pain (1 - 3)  benzocaine 15 mG/menthol 3.6 mG Lozenge 1 Lozenge Oral every 4 hours PRN Sore Throat  dextrose Gel 1 Dose(s) Oral once PRN Blood Glucose LESS THAN 70 milliGRAM(s)/deciliter  glucagon  Injectable 1 milliGRAM(s) IntraMuscular once PRN Glucose LESS THAN 70 milligrams/deciliter  hydrALAZINE Injectable 10 milliGRAM(s) IV Push every 2 hours PRN SBP >165  HYDROmorphone  Injectable 1 milliGRAM(s) IV Push every 4 hours PRN Severe Pain (7 - 10)/break thru  ondansetron Injectable 4 milliGRAM(s) IV Push every 6 hours PRN Nausea  oxyCODONE    IR 5 milliGRAM(s) Oral every 4 hours PRN Moderate Pain  oxyCODONE    IR 10 milliGRAM(s) Oral every 4 hours PRN Severe Pain          PHYSICAL EXAM:    Gen: NAD    Eyes: SOBIA    Neurological: No focal CN grossly intact, non focal    ENMT: Anicteric sclera    Neck: incision C/D/I no hematoma    Pulmonary: CTA    Cardiovascular: RRR     Gastrointestinal: soft non tender, non distended    Extremities: no edema    Skin: intcat    Musculoskeletal: no deformities.          LABS:  CBC Full  -  ( 12 Oct 2017 02:48 )  WBC Count : 9.7 K/uL  Hemoglobin : 8.7 g/dL  Hematocrit : 27.9 %  Platelet Count - Automated : 187 K/uL  Mean Cell Volume : 91.2 fl  Mean Cell Hemoglobin : 28.4 pg  Mean Cell Hemoglobin Concentration : 31.2 g/dL  Auto Neutrophil # : 7.8 K/uL  Auto Lymphocyte # : 0.7 K/uL  Auto Monocyte # : 1.0 K/uL  Auto Eosinophil # : 0.0 K/uL  Auto Basophil # : 0.0 K/uL  Auto Neutrophil % : 80.8 %  Auto Lymphocyte % : 7.5 %  Auto Monocyte % : 10.8 %  Auto Eosinophil % : 0.2 %  Auto Basophil % : 0.4 %    10-12    142  |  108<H>  |  61.0<H>  ----------------------------<  155<H>  4.7   |  21.0<L>  |  3.74<H>    Ca    7.9<L>      12 Oct 2017 02:48  Phos  5.0     10-12  Mg     2.2     10-12    TPro  6.1<L>  /  Alb  3.4  /  TBili  0.2<L>  /  DBili  x   /  AST  19  /  ALT  24  /  AlkPhos  82  10-11    PTT - ( 12 Oct 2017 02:48 )  PTT:45.3 sec    RECENT CULTURES:      LIVER FUNCTIONS - ( 11 Oct 2017 18:20 )  Alb: 3.4 g/dL / Pro: 6.1 g/dL / ALK PHOS: 82 U/L / ALT: 24 U/L / AST: 19 U/L / GGT: x               CAPILLARY BLOOD GLUCOSE  171 (12 Oct 2017 11:00)  137 (12 Oct 2017 07:00)  195 (11 Oct 2017 17:37)      RADIOLOGY & ADDITIONAL STUDIES:    ASSESSMENT/PLAN:  83yMale presenting with: Right CEA stable.    Neuro: no deficit, cont current regime of anlagesia    CV: perfusions adequate cont current meds    Pulm: IS    GI/Nutrition: regular diet    /Renal: follow retention, HD per renal    ID: no issues    Endo: Glycemia at target    Skin: intact    Proph: SCD ambulation    Dispo: Floor tranfer, possible d/c per vascular team      CRITICAL CARE TIME SPENT: 31

## 2017-10-12 NOTE — PROGRESS NOTE ADULT - SUBJECTIVE AND OBJECTIVE BOX
Patient is a 83y old  Male who presents with a chief complaint of " I have a blockage in my right carotid " (10 Oct 2017 11:10)    Pt is S/P   R CEA            POD#   1  Had difficulty with urination overnight requiring straight cath ans yet to void again  C/O sore throat    Vital Signs Last 24 Hrs  T(C): 37 (12 Oct 2017 02:00), Max: 37.5 (11 Oct 2017 21:11)  T(F): 98.6 (12 Oct 2017 02:00), Max: 99.5 (11 Oct 2017 21:11)  HR: 55 (12 Oct 2017 05:00) (52 - 76)  BP: 143/65 (12 Oct 2017 05:00) (113/65 - 183/80)  BP(mean): 94 (12 Oct 2017 05:00) (83 - 130)  RR: 11 (12 Oct 2017 05:00) (11 - 39)  SpO2: 100% (12 Oct 2017 05:00) (95% - 100%)  I&O's Detail    11 Oct 2017 07:01  -  12 Oct 2017 07:00  --------------------------------------------------------  IN:    heparin Infusion: 91 mL    lactated ringers.: 800 mL    Oral Fluid: 420 mL    sodium chloride 0.9%: 1500 mL  Total IN: 2811 mL    OUT:    Estimated Blood Loss: 200 mL    Indwelling Catheter - Urethral: 390 mL    Intermittent Catheterization - Urethral: 450 mL  Total OUT: 1040 mL    Total NET: 1771 mL    MEDICATIONS  (STANDING):  aspirin enteric coated 162 milliGRAM(s) Oral daily  atorvastatin 40 milliGRAM(s) Oral at bedtime  dextrose 5%. 1000 milliLiter(s) (50 mL/Hr) IV Continuous <Continuous>  dextrose 50% Injectable 12.5 Gram(s) IV Push once  dextrose 50% Injectable 25 Gram(s) IV Push once  dextrose 50% Injectable 25 Gram(s) IV Push once  docusate sodium 100 milliGRAM(s) Oral two times a day  epoetin juan Injectable 8000 Unit(s) SubCutaneous <User Schedule>  flecainide 100 milliGRAM(s) Oral two times a day  hydrALAZINE 25 milliGRAM(s) Oral every 8 hours  insulin lispro (HumaLOG) corrective regimen sliding scale   SubCutaneous three times a day before meals  senna 2 Tablet(s) Oral at bedtime  tamsulosin 0.4 milliGRAM(s) Oral daily    MEDICATIONS  (PRN):  acetaminophen   Tablet 650 milliGRAM(s) Oral every 6 hours PRN For Temp greater than 38 C (100.4 F)  acetaminophen   Tablet. 650 milliGRAM(s) Oral every 6 hours PRN Mild Pain (1 - 3)  benzocaine 15 mG/menthol 3.6 mG Lozenge 1 Lozenge Oral every 4 hours PRN Sore Throat  dextrose Gel 1 Dose(s) Oral once PRN Blood Glucose LESS THAN 70 milliGRAM(s)/deciliter  glucagon  Injectable 1 milliGRAM(s) IntraMuscular once PRN Glucose LESS THAN 70 milligrams/deciliter  hydrALAZINE Injectable 10 milliGRAM(s) IV Push every 2 hours PRN SBP >165  HYDROmorphone  Injectable 1 milliGRAM(s) IV Push every 4 hours PRN Severe Pain (7 - 10)/break thru  ondansetron Injectable 4 milliGRAM(s) IV Push every 6 hours PRN Nausea  oxyCODONE    IR 5 milliGRAM(s) Oral every 4 hours PRN Moderate Pain  oxyCODONE    IR 10 milliGRAM(s) Oral every 4 hours PRN Severe Pain    PAST MEDICAL & SURGICAL HISTORY:  Anemia  RICHARDS (dyspnea on exertion)  VT (ventricular tachycardia)  HTN (hypertension)  CAD (coronary artery disease)  CKD (chronic kidney disease): stage IV  Arrhythmia  AV block, 1st degree  DM (diabetes mellitus)  A-V fistula: left arm 5/2017  H/O angioplasty: 2013,  no  intervention  H/O left knee surgery  H/O circumcision: at  age  65    Physical Exam:  General: NAD, OOB in chair eating breakfast without difficulty  Neuro: A&O x3, speech fluent, face symmetrical, tongue midline, MARQUEZ x4 =  Neck: R neck incision with steris in place CDI, no erythema or hematoma, MARJORIE with serosang drainage  Extremities: No edema      LABS:                        8.7    9.7   )-----------( 187      ( 12 Oct 2017 02:48 )             27.9     10-12    142  |  108<H>  |  61.0<H>  ----------------------------<  155<H>  4.7   |  21.0<L>  |  3.74<H>    Ca    7.9<L>      12 Oct 2017 02:48  Phos  5.0     10-12  Mg     2.2     10-12    TPro  6.1<L>  /  Alb  3.4  /  TBili  0.2<L>  /  DBili  x   /  AST  19  /  ALT  24  /  AlkPhos  82  10-11    PTT - ( 12 Oct 2017 02:48 )  PTT:45.3 sec  CAPILLARY BLOOD GLUCOSE  195 (11 Oct 2017 17:37)  157 (11 Oct 2017 12:43)  POCT Blood Glucose.: 137 mg/dL (12 Oct 2017 08:13)  POCT Blood Glucose.: 195 mg/dL (11 Oct 2017 17:37)  POCT Blood Glucose.: 157 mg/dL (11 Oct 2017 12:33)          Assessment:83y Male with h/o carotid stenosis   S/P R CEA  POD# 1  Post op urinary retention    Plan:  Cont ASA and statin  DC Heparin gtt  Will add hydralazine for BP (-170) and monitor  DC MARJORIE  OOB/Ambulate/PT  DM management with ss coverage  Add flomax for urinary retention; reinsert carrera if necessary  Possible dc home later today  Seen and discussed with Dr. Barbara Sorenson

## 2017-10-12 NOTE — CONSULT NOTE ADULT - SUBJECTIVE AND OBJECTIVE BOX
Renal :    Patient is an  83y old  W  Male who presents with a chief complaint of " I have a blockage in my right carotid " (10 Oct 2017 11:10)    HPI: CAD , CKD - 4 , Anemia - on AMY * S/P AVF.    PAST MEDICAL & SURGICAL HISTORY:    Anemia  VT (ventricular tachycardia)  HTN (hypertension)  CAD (coronary artery disease)  CKD (chronic kidney disease): stage IV  DM (diabetes mellitus)    A-V fistula: left arm 5/2017  H/O angioplasty: 2013,  no  intervention  H/O left knee surgery  H/O circumcision: at  age  65    FAMILY HISTORY:  Family history of premature CAD (Mother)  Family history of lung cancer (Father)  Family history of cancer (Father)    Social History: Non Smoker, No ETOH,    MEDICATIONS  (STANDING):  aspirin enteric coated 162 milliGRAM(s) Oral daily  atorvastatin 40 milliGRAM(s) Oral at bedtime  dextrose 5%. 1000 milliLiter(s) (50 mL/Hr) IV Continuous <Continuous>  dextrose 50% Injectable 12.5 Gram(s) IV Push once  dextrose 50% Injectable 25 Gram(s) IV Push once  dextrose 50% Injectable 25 Gram(s) IV Push once  docusate sodium 100 milliGRAM(s) Oral two times a day  flecainide 100 milliGRAM(s) Oral two times a day  hydrALAZINE 25 milliGRAM(s) Oral every 8 hours  insulin lispro (HumaLOG) corrective regimen sliding scale   SubCutaneous three times a day before meals  senna 2 Tablet(s) Oral at bedtime  tamsulosin 0.4 milliGRAM(s) Oral daily    MEDICATIONS  (PRN):  acetaminophen   Tablet 650 milliGRAM(s) Oral every 6 hours PRN For Temp greater than 38 C (100.4 F)  acetaminophen   Tablet. 650 milliGRAM(s) Oral every 6 hours PRN Mild Pain (1 - 3)  benzocaine 15 mG/menthol 3.6 mG Lozenge 1 Lozenge Oral every 4 hours PRN Sore Throat  dextrose Gel 1 Dose(s) Oral once PRN Blood Glucose LESS THAN 70 milliGRAM(s)/deciliter  glucagon  Injectable 1 milliGRAM(s) IntraMuscular once PRN Glucose LESS THAN 70 milligrams/deciliter  hydrALAZINE Injectable 10 milliGRAM(s) IV Push every 2 hours PRN SBP >165  HYDROmorphone  Injectable 1 milliGRAM(s) IV Push every 4 hours PRN Severe Pain (7 - 10)/break thru  ondansetron Injectable 4 milliGRAM(s) IV Push every 6 hours PRN Nausea  oxyCODONE    IR 5 milliGRAM(s) Oral every 4 hours PRN Moderate Pain  oxyCODONE    IR 10 milliGRAM(s) Oral every 4 hours PRN Severe Pain      Allergies    Plavix (Hives)  Toprol-XL (Rash)    REVIEW OF SYSTEMS:    CONSTITUTIONAL: No fever, weight loss, or fatigue  EYES: No eye pain, visual disturbances, or discharge  NECK: No pain or stiffness  RESPIRATORY: No cough, wheezing, chills or hemoptysis; No shortness of breath  CARDIOVASCULAR: No chest pain, palpitations, dizziness, or leg swelling  GASTROINTESTINAL: No abdominal or epigastric pain. No nausea, vomiting, or hematemesis;   GENITOURINARY: +  frequency, No  hematuria, or incontinence  NEUROLOGICAL: No headaches, memory loss, loss of strength, numbness, or tremors  SKIN: No itching, burning, rashes, or lesions   LYMPH NODES: No enlarged glands  ENDOCRINE: No heat or cold intolerance; No hair loss  MUSCULOSKELETAL: No joint pain or swelling; No muscle, back, or extremity pain  PSYCHIATRIC: No depression, anxiety, mood swings, or difficulty sleeping  HEME/LYMPH: No easy bruising, or bleeding gums    Vital Signs Last 24 Hrs  T(C): 36.7 (12 Oct 2017 16:00), Max: 37.5 (11 Oct 2017 21:11)  T(F): 98 (12 Oct 2017 16:00), Max: 99.5 (11 Oct 2017 21:11)  HR: 71 (12 Oct 2017 11:00) (50 - 80)  BP: 114/84 (12 Oct 2017 11:00) (114/84 - 183/80)  BP(mean): 95 (12 Oct 2017 11:00) (92 - 115)  RR: 14 (12 Oct 2017 09:00) (11 - 39)  SpO2: 99% (12 Oct 2017 11:00) (95% - 100%)    PHYSICAL EXAM:    GENERAL: NAD, well-developed,   HEAD:  Atraumatic, Normocephalic,  EYES: EOMI, PERRLA, conjunctiva and sclera clear, pale,  NECK: Supple, No JVD, S/P R - CEA,  NERVOUS SYSTEM:  Alert & Oriented X 3,   CHEST/LUNG: Clear to percussion bilaterally; No rales, rhonchi, wheezing, or rubs,  HEART: Regular rate and rhythm; + Systolic murmur,  No rubs, or gallops  ABDOMEN: Soft, Nontender, Nondistended; Bowel sounds present  EXTREMITIES:  2+ Peripheral Pulses, No clubbing, cyanosis, Trace  edema  LYMPH: No lymphadenopathy noted  SKIN: No rashes or lesions    LABS:                         8.7   9.7   )-----------( 187      ( 12 Oct 2017 02:48 )             27.9     10-12    142  |  108<H>  |  61.0<H>  ----------------------------<  155<H>  4.7   |  21.0<L>  |  3.74<H>    Ca    7.9<L>      12 Oct 2017 02:48  Phos  5.0     10-12  Mg     2.2     10-12    TPro  6.1<L>  /  Alb  3.4  /  TBili  0.2<L>  /  DBili  x   /  AST  19  /  ALT  24  /  AlkPhos  82  10-11    PTT - ( 12 Oct 2017 02:48 )  PTT:45.3 sec    Magnesium, Serum: 2.2 mg/dL (10-12 @ 02:48)  Phosphorus Level, Serum: 5.0 mg/dL (10-12 @ 02:48)      RADIOLOGY & ADDITIONAL TESTS:

## 2017-10-12 NOTE — CONSULT NOTE ADULT - ASSESSMENT
A ; CKD - 4,  2.Renal Anemia - on AMY,  3.DMN  4.S/P R - CEA,    P : AMY, Control Volume & HTN Status,    HD in several weeks,

## 2017-10-12 NOTE — PROGRESS NOTE ADULT - SUBJECTIVE AND OBJECTIVE BOX
Renal :    Stenosis of Right carotid artery  10/11/2017  -  Active,    Procedure:     Carotid endarterectomy  10/11/2017,        · Operative Findings : 	ulcerative plaque to the common/internal and external carotid,	      · Specimen :	plaque,	  · Estimated Blood Loss	150 ml.,	      Patient w. CKD - 4 , Renal Anemia on AMY,    Will Resume AMY, AVF Mature,    Full consult to Follow,    Discussed w. the daughter,

## 2017-10-13 ENCOUNTER — TRANSCRIPTION ENCOUNTER (OUTPATIENT)
Age: 82
End: 2017-10-13

## 2017-10-13 VITALS — DIASTOLIC BLOOD PRESSURE: 70 MMHG | SYSTOLIC BLOOD PRESSURE: 170 MMHG

## 2017-10-13 LAB
ANION GAP SERPL CALC-SCNC: 13 MMOL/L — SIGNIFICANT CHANGE UP (ref 5–17)
APTT BLD: 29.1 SEC — SIGNIFICANT CHANGE UP (ref 27.5–37.4)
BUN SERPL-MCNC: 63 MG/DL — HIGH (ref 8–20)
CALCIUM SERPL-MCNC: 8 MG/DL — LOW (ref 8.6–10.2)
CHLORIDE SERPL-SCNC: 108 MMOL/L — HIGH (ref 98–107)
CO2 SERPL-SCNC: 22 MMOL/L — SIGNIFICANT CHANGE UP (ref 22–29)
CREAT SERPL-MCNC: 3.62 MG/DL — HIGH (ref 0.5–1.3)
GLUCOSE SERPL-MCNC: 108 MG/DL — SIGNIFICANT CHANGE UP (ref 70–115)
HCT VFR BLD CALC: 27.2 % — LOW (ref 42–52)
HGB BLD-MCNC: 8.4 G/DL — LOW (ref 14–18)
MAGNESIUM SERPL-MCNC: 2.4 MG/DL — SIGNIFICANT CHANGE UP (ref 1.6–2.6)
MCHC RBC-ENTMCNC: 28.5 PG — SIGNIFICANT CHANGE UP (ref 27–31)
MCHC RBC-ENTMCNC: 30.9 G/DL — LOW (ref 32–36)
MCV RBC AUTO: 92.2 FL — SIGNIFICANT CHANGE UP (ref 80–94)
PHOSPHATE SERPL-MCNC: 4 MG/DL — SIGNIFICANT CHANGE UP (ref 2.4–4.7)
PLATELET # BLD AUTO: 189 K/UL — SIGNIFICANT CHANGE UP (ref 150–400)
POTASSIUM SERPL-MCNC: 4.7 MMOL/L — SIGNIFICANT CHANGE UP (ref 3.5–5.3)
POTASSIUM SERPL-SCNC: 4.7 MMOL/L — SIGNIFICANT CHANGE UP (ref 3.5–5.3)
RBC # BLD: 2.95 M/UL — LOW (ref 4.6–6.2)
RBC # FLD: 16.3 % — HIGH (ref 11–15.6)
SODIUM SERPL-SCNC: 143 MMOL/L — SIGNIFICANT CHANGE UP (ref 135–145)
WBC # BLD: 7.2 K/UL — SIGNIFICANT CHANGE UP (ref 4.8–10.8)
WBC # FLD AUTO: 7.2 K/UL — SIGNIFICANT CHANGE UP (ref 4.8–10.8)

## 2017-10-13 PROCEDURE — 82803 BLOOD GASES ANY COMBINATION: CPT

## 2017-10-13 PROCEDURE — 85730 THROMBOPLASTIN TIME PARTIAL: CPT

## 2017-10-13 PROCEDURE — 99233 SBSQ HOSP IP/OBS HIGH 50: CPT | Mod: GC

## 2017-10-13 PROCEDURE — C1889: CPT

## 2017-10-13 PROCEDURE — 84295 ASSAY OF SERUM SODIUM: CPT

## 2017-10-13 PROCEDURE — 84132 ASSAY OF SERUM POTASSIUM: CPT

## 2017-10-13 PROCEDURE — 86850 RBC ANTIBODY SCREEN: CPT

## 2017-10-13 PROCEDURE — 84100 ASSAY OF PHOSPHORUS: CPT

## 2017-10-13 PROCEDURE — 85014 HEMATOCRIT: CPT

## 2017-10-13 PROCEDURE — 82962 GLUCOSE BLOOD TEST: CPT

## 2017-10-13 PROCEDURE — 82435 ASSAY OF BLOOD CHLORIDE: CPT

## 2017-10-13 PROCEDURE — 83735 ASSAY OF MAGNESIUM: CPT

## 2017-10-13 PROCEDURE — 88311 DECALCIFY TISSUE: CPT

## 2017-10-13 PROCEDURE — 36415 COLL VENOUS BLD VENIPUNCTURE: CPT

## 2017-10-13 PROCEDURE — 71045 X-RAY EXAM CHEST 1 VIEW: CPT

## 2017-10-13 PROCEDURE — 83036 HEMOGLOBIN GLYCOSYLATED A1C: CPT

## 2017-10-13 PROCEDURE — 83605 ASSAY OF LACTIC ACID: CPT

## 2017-10-13 PROCEDURE — 86920 COMPATIBILITY TEST SPIN: CPT

## 2017-10-13 PROCEDURE — 85027 COMPLETE CBC AUTOMATED: CPT

## 2017-10-13 PROCEDURE — 80053 COMPREHEN METABOLIC PANEL: CPT

## 2017-10-13 PROCEDURE — 93005 ELECTROCARDIOGRAM TRACING: CPT

## 2017-10-13 PROCEDURE — 86901 BLOOD TYPING SEROLOGIC RH(D): CPT

## 2017-10-13 PROCEDURE — 82330 ASSAY OF CALCIUM: CPT

## 2017-10-13 PROCEDURE — 80048 BASIC METABOLIC PNL TOTAL CA: CPT

## 2017-10-13 PROCEDURE — 86900 BLOOD TYPING SEROLOGIC ABO: CPT

## 2017-10-13 PROCEDURE — 82947 ASSAY GLUCOSE BLOOD QUANT: CPT

## 2017-10-13 PROCEDURE — 88304 TISSUE EXAM BY PATHOLOGIST: CPT

## 2017-10-13 RX ORDER — ASPIRIN/CALCIUM CARB/MAGNESIUM 324 MG
2 TABLET ORAL
Qty: 0 | Refills: 0 | DISCHARGE
Start: 2017-10-13

## 2017-10-13 RX ORDER — DOCUSATE SODIUM 100 MG
1 CAPSULE ORAL
Qty: 0 | Refills: 0 | DISCHARGE
Start: 2017-10-13

## 2017-10-13 RX ORDER — ACETAMINOPHEN 500 MG
2 TABLET ORAL
Qty: 0 | Refills: 0 | DISCHARGE
Start: 2017-10-13

## 2017-10-13 RX ORDER — NIFEDIPINE 30 MG
1 TABLET, EXTENDED RELEASE 24 HR ORAL
Qty: 0 | Refills: 0 | COMMUNITY

## 2017-10-13 RX ORDER — ATORVASTATIN CALCIUM 80 MG/1
1 TABLET, FILM COATED ORAL
Qty: 0 | Refills: 0 | DISCHARGE
Start: 2017-10-13

## 2017-10-13 RX ORDER — TAMSULOSIN HYDROCHLORIDE 0.4 MG/1
1 CAPSULE ORAL
Qty: 30 | Refills: 0
Start: 2017-10-13

## 2017-10-13 RX ORDER — HYDRALAZINE HCL 50 MG
50 TABLET ORAL EVERY 8 HOURS
Qty: 0 | Refills: 0 | Status: DISCONTINUED | OUTPATIENT
Start: 2017-10-13 | End: 2017-10-13

## 2017-10-13 RX ORDER — HYDRALAZINE HCL 50 MG
1 TABLET ORAL
Qty: 90 | Refills: 0
Start: 2017-10-13

## 2017-10-13 RX ADMIN — ERYTHROPOIETIN 20000 UNIT(S): 10000 INJECTION, SOLUTION INTRAVENOUS; SUBCUTANEOUS at 13:08

## 2017-10-13 RX ADMIN — Medication 162 MILLIGRAM(S): at 12:44

## 2017-10-13 RX ADMIN — Medication 25 MILLIGRAM(S): at 05:53

## 2017-10-13 RX ADMIN — Medication 100 MILLIGRAM(S): at 05:53

## 2017-10-13 RX ADMIN — Medication 50 MILLIGRAM(S): at 13:16

## 2017-10-13 RX ADMIN — Medication 2: at 12:40

## 2017-10-13 RX ADMIN — TAMSULOSIN HYDROCHLORIDE 0.4 MILLIGRAM(S): 0.4 CAPSULE ORAL at 12:41

## 2017-10-13 RX ADMIN — Medication 20 MILLIGRAM(S): at 12:41

## 2017-10-13 NOTE — DISCHARGE NOTE ADULT - NS AS ACTIVITY OBS
Do not drive or operate machinery/Walking-Outdoors allowed/Walking-Indoors allowed/Stairs allowed/Showering allowed/No Heavy lifting/straining

## 2017-10-13 NOTE — DISCHARGE NOTE ADULT - HOSPITAL COURSE
84 y/o male had vascular screening with h/o HTN, CKD had right carotid doppler which showed 90% right carotid stenosis. Followed bY Dr Barbara Sorenson and admitted to hospital for endarterectomy. Preop Heparin gtt started with h/o Plavix allergy. Taken to the OR on 10/11 by Dr Barbara Sorenson and had R CEA. Pt extubated and awoke without any neurological deficits. Was moniotred in the ICU and had difficulty with urinary retention for which he was straight cathed and started on flomax. He was OOB and transferred to the floor. He is stable for dc home on POD #2. He is voiding freely and ambulating independently

## 2017-10-13 NOTE — PROGRESS NOTE ADULT - SUBJECTIVE AND OBJECTIVE BOX
Renal Progress Note:  Patient seen and examined at bedside this morning, no complaints, sitting in chair in no acute distress.  Appears much improved; complaining of slight sore throat likely from intubation.    HPI: CAD , CKD - 4 , Anemia - on AMY * S/P AVF.    PAST MEDICAL & SURGICAL HISTORY:    Anemia  VT (ventricular tachycardia)  HTN (hypertension)  CAD (coronary artery disease)  CKD (chronic kidney disease): stage IV  DM (diabetes mellitus)    A-V fistula: left arm 5/2017  H/O angioplasty: 2013,  no  intervention  H/O left knee surgery  H/O circumcision: at  age  65    FAMILY HISTORY:  Family history of premature CAD (Mother)  Family history of lung cancer (Father)  Family history of cancer (Father)    Social History: Non Smoker, No ETOH.    MEDICATIONS:  Reviewed    Allergies  Plavix (Hives)  Toprol-XL (Rash)    REVIEW OF SYSTEMS:    CONSTITUTIONAL: No fever, weight loss, or fatigue  EYES: No eye pain, visual disturbances, or discharge  NECK: No pain or stiffness  RESPIRATORY: throat soreness  CARDIOVASCULAR: No chest pain, palpitations, dizziness, or leg swelling  GASTROINTESTINAL: No abdominal or epigastric pain. No nausea, vomiting, or hematemesis;   GENITOURINARY: +  frequency, No  hematuria, or incontinence  NEUROLOGICAL: No headaches, memory loss, loss of strength, numbness, or tremors  SKIN: No itching, burning, rashes, or lesions   LYMPH NODES: No enlarged glands  ENDOCRINE: No heat or cold intolerance; No hair loss  MUSCULOSKELETAL: No joint pain or swelling; No muscle, back, or extremity pain  PSYCHIATRIC: No depression, anxiety, mood swings, or difficulty sleeping  HEME/LYMPH: No easy bruising, or bleeding gums    Vital Signs Last 24 Hrs  reviewed vitals    PHYSICAL EXAM:  GENERAL: NAD, well-developed,   HEAD:  Atraumatic, Normocephalic,  EYES: EOMI, PERRLA, conjunctiva and sclera clear, pale,  NECK: Supple, No JVD, S/P R - CEA wrapped  NERVOUS SYSTEM:  Alert & Oriented X 3,   CHEST/LUNG: Clear to percussion bilaterally; No rales, rhonchi, wheezing, or rubs,  HEART: Regular rate and rhythm; + Systolic murmur,  No rubs, or gallops  ABDOMEN: Soft, Nontender, Nondistended; Bowel sounds present  EXTREMITIES:  2+ Peripheral Pulses, No clubbing, cyanosis, Trace  edema  LYMPH: No lymphadenopathy noted  SKIN: No rashes or lesions    LABS:   reviewed; scr improving        Assessment and Recommendation:   · Assessment		  1 CKD - 4  SCr improving; would avoid nephrotoxic medications  Encourage po; drink to thirst    2) MBD  Check phos daily  No binder needed at this time    3) Anemia of renal origin  Check iron profile - can be done outpatient follows Dr. Avendano  On AMY, giving higher dose today 20,000 U x 1    4) Volume/HTN  Controlled; no shortness of breath, comfortable, would continue with same BP medications  On torsemide and hydralazine    5) S/P R - CEA    Will follow Dr. Avendano in the office at [facility]

## 2017-10-13 NOTE — DISCHARGE NOTE ADULT - CARE PLAN
Principal Discharge DX:	S/P carotid endarterectomy  Goal:	Stroke risk reduction  Instructions for follow-up, activity and diet:	May shower daily. Remove dressing prior. Wash incision with soap and water and pat dry. Leave open to air. No ointments, powders or creams to incision. Do not shave over incision x 3 weeks or until healed. Monitor incision for any redness, swelling or drainage and call office immediately with any concerns. Follow up with Dr Barbara Sorenson in office in  1 week. Office will call with appointment. Office number [phone]

## 2017-10-13 NOTE — PROGRESS NOTE ADULT - SUBJECTIVE AND OBJECTIVE BOX
Patient is a 83y old  Male who presents with a chief complaint of " I have a blockage in my right carotid " (10 Oct 2017 11:10)    Pt is S/P   R CEA            POD#   2  Had difficulty with urinatary retention post op  No further issues  No complaints    Vital Signs Last 24 Hrs  T(C): 37.1 (13 Oct 2017 05:49), Max: 37.1 (12 Oct 2017 21:34)  T(F): 98.8 (13 Oct 2017 05:49), Max: 98.8 (13 Oct 2017 05:49)  HR: 56 (13 Oct 2017 05:49) (56 - 71)  BP: 136/62 (13 Oct 2017 09:21) (114/84 - 168/56)  BP(mean): 95 (12 Oct 2017 11:00) (95 - 95)  RR: 17 (13 Oct 2017 05:49) (16 - 17)  SpO2: 97% (13 Oct 2017 05:49) (97% - 99%)  MEDICATIONS  (STANDING):  aspirin enteric coated 162 milliGRAM(s) Oral daily  atorvastatin 40 milliGRAM(s) Oral at bedtime  dextrose 5%. 1000 milliLiter(s) (50 mL/Hr) IV Continuous <Continuous>  dextrose 50% Injectable 12.5 Gram(s) IV Push once  dextrose 50% Injectable 25 Gram(s) IV Push once  dextrose 50% Injectable 25 Gram(s) IV Push once  docusate sodium 100 milliGRAM(s) Oral two times a day  epoetin juan Injectable 8000 Unit(s) SubCutaneous <User Schedule>  flecainide 100 milliGRAM(s) Oral two times a day  hydrALAZINE 25 milliGRAM(s) Oral every 8 hours  insulin lispro (HumaLOG) corrective regimen sliding scale   SubCutaneous three times a day before meals  senna 2 Tablet(s) Oral at bedtime  tamsulosin 0.4 milliGRAM(s) Oral daily    MEDICATIONS  (STANDING):  aspirin enteric coated 162 milliGRAM(s) Oral daily  atorvastatin 40 milliGRAM(s) Oral at bedtime  dextrose 5%. 1000 milliLiter(s) (50 mL/Hr) IV Continuous <Continuous>  dextrose 50% Injectable 12.5 Gram(s) IV Push once  dextrose 50% Injectable 25 Gram(s) IV Push once  dextrose 50% Injectable 25 Gram(s) IV Push once  docusate sodium 100 milliGRAM(s) Oral two times a day  epoetin juan Injectable 72488 Unit(s) SubCutaneous once  flecainide 100 milliGRAM(s) Oral two times a day  hydrALAZINE 50 milliGRAM(s) Oral every 8 hours  insulin lispro (HumaLOG) corrective regimen sliding scale   SubCutaneous three times a day before meals  senna 2 Tablet(s) Oral at bedtime  tamsulosin 0.4 milliGRAM(s) Oral daily  torsemide 20 milliGRAM(s) Oral daily    MEDICATIONS  (PRN):  acetaminophen   Tablet 650 milliGRAM(s) Oral every 6 hours PRN For Temp greater than 38 C (100.4 F)  acetaminophen   Tablet. 650 milliGRAM(s) Oral every 6 hours PRN Mild Pain (1 - 3)  benzocaine 15 mG/menthol 3.6 mG Lozenge 1 Lozenge Oral every 4 hours PRN Sore Throat  dextrose Gel 1 Dose(s) Oral once PRN Blood Glucose LESS THAN 70 milliGRAM(s)/deciliter  glucagon  Injectable 1 milliGRAM(s) IntraMuscular once PRN Glucose LESS THAN 70 milligrams/deciliter  hydrALAZINE Injectable 10 milliGRAM(s) IV Push every 2 hours PRN SBP >165  HYDROmorphone  Injectable 1 milliGRAM(s) IV Push every 4 hours PRN Severe Pain (7 - 10)/break thru  ondansetron Injectable 4 milliGRAM(s) IV Push every 6 hours PRN Nausea  oxyCODONE    IR 5 milliGRAM(s) Oral every 4 hours PRN Moderate Pain  oxyCODONE    IR 10 milliGRAM(s) Oral every 4 hours PRN Severe Pain      PAST MEDICAL & SURGICAL HISTORY:  Anemia  RICHARDS (dyspnea on exertion)  VT (ventricular tachycardia)  HTN (hypertension)  CAD (coronary artery disease)  CKD (chronic kidney disease): stage IV  Arrhythmia  AV block, 1st degree  DM (diabetes mellitus)  A-V fistula: left arm 5/2017  H/O angioplasty: 2013,  no  intervention  H/O left knee surgery                        8.4    7.2   )-----------( 189      ( 13 Oct 2017 05:16 )             27.2   H/O circumcision: at  age  65    Physical Exam:  General: NAD, OOB in chair eating breakfast without difficulty  Neuro: A&O x3, speech fluent, face symmetrical, tongue midline, MARQUEZ x4 =  Neck: R neck incision with steris in place CDI, no erythema or hematoma  Extremities: No edema      LABS:    10-13    143  |  108<H>  |  63.0<H>  ----------------------------<  108  4.7   |  22.0  |  3.62<H>    Ca    8.0<L>      13 Oct 2017 05:16  Phos  4.0     10-13  Mg     2.4     10-13    TPro  6.1<L>  /  Alb  3.4  /  TBili  0.2<L>  /  DBili  x   /  AST  19  /  ALT  24  /  AlkPhos  82  10-11        Assessment:83y Male with h/o carotid stenosis   S/P R CEA  POD# 1  CAPILLARY BLOOD GLUCOSE  171 (12 Oct 2017 11:00)      POCT Blood Glucose.: 105 mg/dL (13 Oct 2017 07:54)  POCT Blood Glucose.: 161 mg/dL (12 Oct 2017 16:48)  POCT Blood Glucose.: 171 mg/dL (12 Oct 2017 11:14)  Post op urinary retention    Plan:  Cont ASA and statin  Cont hydralazine   DM management with ss coverage  Cont flomax for urinary retention  DC home today  Discussed with Dr. Barbara Sorenson

## 2017-10-13 NOTE — DISCHARGE NOTE ADULT - MEDICATION SUMMARY - MEDICATIONS TO TAKE
I will START or STAY ON the medications listed below when I get home from the hospital:    acetaminophen 325 mg oral tablet  -- 2 tab(s) by mouth every 6 hours, As needed, Mild Pain (1 - 3)  -- Indication: For PAIN    aspirin 81 mg oral delayed release tablet  -- 2 tab(s) by mouth once a day  -- Indication: For CAROTID DISEASE    sodium bicarbonate 650 mg oral tablet  -- 1 tab(s) by mouth 4 times a day  -- Indication: For CKD    tamsulosin 0.4 mg oral capsule  -- 1 cap(s) by mouth once a day (at bedtime)   -- Indication: For BPH    flecainide 100 mg oral tablet  -- 1 tab(s) by mouth every 12 hours  -- Indication: For ARRHYTHMIA    glipiZIDE 5 mg oral tablet  -- 1 tab(s) by mouth once a day  -- Indication: For DM    atorvastatin 40 mg oral tablet  -- 1 tab(s) by mouth once a day (at bedtime)  -- Indication: For HLD    torsemide 20 mg oral tablet  -- 1 tab(s) by mouth once a day  -- Indication: For WATER PILL    ferrous sulfate 325 mg (65 mg elemental iron) oral delayed release tablet  -- 1 tab(s) by mouth once a day  -- Indication: For CKD    docusate sodium 100 mg oral capsule  -- 1 cap(s) by mouth 2 times a day  -- Indication: For STOOL SOFTENER    hydrALAZINE 50 mg oral tablet  -- 1 tab(s) by mouth every 8 hours  -- Indication: For HTN    Nephro-Thomas oral tablet  -- 1 tab(s) by mouth once a day  -- Indication: For CKD    calcitriol 0.25 mcg oral capsule  -- 1 cap(s) by mouth once a day  -- Indication: For CKD    Vitamin C 250 mg oral tablet  -- 1 tab(s) by mouth once a day  -- Indication: For ANEMIA

## 2017-10-13 NOTE — DISCHARGE NOTE ADULT - PLAN OF CARE
Stroke risk reduction May shower daily. Remove dressing prior. Wash incision with soap and water and pat dry. Leave open to air. No ointments, powders or creams to incision. Do not shave over incision x 3 weeks or until healed. Monitor incision for any redness, swelling or drainage and call office immediately with any concerns. Follow up with Dr Barbara Sorenson in office in  1 week. Office will call with appointment. Office number [phone]

## 2017-10-13 NOTE — DISCHARGE NOTE ADULT - CARE PROVIDERS DIRECT ADDRESSES
[email or web address]

## 2017-10-13 NOTE — DISCHARGE NOTE ADULT - CARE PROVIDER_API CALL
Siva Winston), Surgery; Vascular Surgery  [address]  [address]st [place] [address]  Phone: [phone]  Fax: [phone]    Bay Avendano), Internal Medicine; Nephrology  [address] [place] [address]  Phone: [phone]  Fax: [phone]    Brandon Angel), Cardiovascular Disease  [address] [place] [address]  Phone: [phone]  Fax: [phone]

## 2017-10-13 NOTE — DISCHARGE NOTE ADULT - MEDICATION SUMMARY - MEDICATIONS TO CHANGE
I will SWITCH the dose or number of times a day I take the medications listed below when I get home from the hospital:    hydrALAZINE 50 mg oral tablet  -- 1 tab(s) by mouth every 12 hours

## 2017-10-13 NOTE — CONSULT NOTE ADULT - SUBJECTIVE AND OBJECTIVE BOX
[place] [facility], [facility]                                   [address] [place] [address]                                                      PHONE: [phone]                                                         FAX: [phone]  [email or web address]  ---------------------------------------------------------------------------------------------------------------------------------    HPI:  CHRISTIANO ELIZABETH is an 83y Male PMHX as per below who presented for CEA left side.  pt found to have severe carotid stenosis.  Pt underwent cea with no post op complications.  pt denies chest pain, palps, sob.     PAST MEDICAL & SURGICAL HISTORY:  Anemia  RICHARDS (dyspnea on exertion)  VT (ventricular tachycardia)  HTN (hypertension)  CAD (coronary artery disease)  CKD (chronic kidney disease): stage IV  Arrhythmia  AV block, 1st degree  DM (diabetes mellitus)  A-V fistula: left arm 5/2017  H/O angioplasty: 2013,  no  intervention  H/O left knee surgery  H/O circumcision: at  age  65      Plavix (Hives)  Toprol-XL (Rash)      MEDICATIONS  (STANDING):  aspirin enteric coated 162 milliGRAM(s) Oral daily  atorvastatin 40 milliGRAM(s) Oral at bedtime  dextrose 5%. 1000 milliLiter(s) (50 mL/Hr) IV Continuous <Continuous>  dextrose 50% Injectable 12.5 Gram(s) IV Push once  dextrose 50% Injectable 25 Gram(s) IV Push once  dextrose 50% Injectable 25 Gram(s) IV Push once  docusate sodium 100 milliGRAM(s) Oral two times a day  epoetin juan Injectable 58012 Unit(s) SubCutaneous once  flecainide 100 milliGRAM(s) Oral two times a day  hydrALAZINE 50 milliGRAM(s) Oral every 8 hours  insulin lispro (HumaLOG) corrective regimen sliding scale   SubCutaneous three times a day before meals  senna 2 Tablet(s) Oral at bedtime  tamsulosin 0.4 milliGRAM(s) Oral daily  torsemide 20 milliGRAM(s) Oral daily    MEDICATIONS  (PRN):  acetaminophen   Tablet 650 milliGRAM(s) Oral every 6 hours PRN For Temp greater than 38 C (100.4 F)  acetaminophen   Tablet. 650 milliGRAM(s) Oral every 6 hours PRN Mild Pain (1 - 3)  benzocaine 15 mG/menthol 3.6 mG Lozenge 1 Lozenge Oral every 4 hours PRN Sore Throat  dextrose Gel 1 Dose(s) Oral once PRN Blood Glucose LESS THAN 70 milliGRAM(s)/deciliter  glucagon  Injectable 1 milliGRAM(s) IntraMuscular once PRN Glucose LESS THAN 70 milligrams/deciliter  hydrALAZINE Injectable 10 milliGRAM(s) IV Push every 2 hours PRN SBP >165  HYDROmorphone  Injectable 1 milliGRAM(s) IV Push every 4 hours PRN Severe Pain (7 - 10)/break thru  ondansetron Injectable 4 milliGRAM(s) IV Push every 6 hours PRN Nausea  oxyCODONE    IR 5 milliGRAM(s) Oral every 4 hours PRN Moderate Pain  oxyCODONE    IR 10 milliGRAM(s) Oral every 4 hours PRN Severe Pain      Family History: Pt denies hx of early cad, SCD, or congenital heart disease.      Social History:  Cigarettes: former                   Alchohol:  no               Illicit Drug Abuse:  no    ROS:  Constitutional: No fever, weight loss or fatigue  Eyes: No eye pain, visual disturbances, or discharge  ENMT:  No difficulty hearing, tinnitus, vertigo; No sinus or throat pain  Neck: No pain or stiffness  Respiratory: No cough, wheezing, chills or hemoptysis  Cardiovascular: No chest pain, palpitations, shortness of breath, dizziness or leg swelling  Gastrointestinal: No abdominal or epigastric pain. No nausea, vomiting or hematemesis; No diarrhea or constipation. No melena or hematochezia.  Genitourinary: No dysuria, frequency, hematuria or incontinence  Rectal: No pain, hemorrhoids or incontinence  Neurological: No headaches, memory loss, loss of strength, numbness or tremors  Skin: No itching, burning, rashes or lesions   Lymph Nodes: No enlarged glands  Endocrine: No heat or cold intolerance; No hair loss  Musculoskeletal: No joint pain or swelling; No muscle, back or extremity pain  Psychiatric: No depression, anxiety, mood swings or difficulty sleeping  Heme/Lymph: No easy bruising or bleeding gums  Allergy and Immunologic: No hives or eczema    Vital Signs Last 24 Hrs  T(C): 37.1 (13 Oct 2017 10:10), Max: 37.1 (12 Oct 2017 21:34)  T(F): 98.7 (13 Oct 2017 10:10), Max: 98.8 (13 Oct 2017 05:49)  HR: 61 (13 Oct 2017 10:10) (56 - 61)  BP: 112/58 (13 Oct 2017 10:10) (112/58 - 168/56)  BP(mean): --  RR: 17 (13 Oct 2017 10:10) (16 - 17)  SpO2: 97% (13 Oct 2017 05:49) (97% - 99%)  ICU Vital Signs Last 24 Hrs  CHRISTIANO GLENN  I&O's Detail    12 Oct 2017 07:01  -  13 Oct 2017 07:00  --------------------------------------------------------  IN:    heparin Infusion: 11 mL    Oral Fluid: 755 mL    sodium chloride 0.9%: 100 mL  Total IN: 866 mL    OUT:    Voided: 800 mL  Total OUT: 800 mL    Total NET: 66 mL        I&O's Summary    12 Oct 2017 07:01  -  13 Oct 2017 07:00  --------------------------------------------------------  IN: 866 mL / OUT: 800 mL / NET: 66 mL      Drug Dosing Weight  CHRISTIANO ELIZABETH      PHYSICAL EXAM:  General: Appears well developed, well nourished alert and cooperative.  HEENT: Head; normocephalic, atraumatic.  Eyes: Pupils reactive, cornea wnl.  Neck: Supple, no nodes adenopathy, no NVD or carotid bruit or thyromegaly.  CARDIOVASCULAR: Normal S1 and S2, No murmur, rub, gallop or lift.   LUNGS: No rales, rhonchi or wheeze. Normal breath sounds bilaterally.  ABDOMEN: Soft, nontender without mass or organomegaly. bowel sounds normoactive.  EXTREMITIES: No clubbing, cyanosis or edema. Distal pulses wnl.   SKIN: warm and dry with normal turgor.  NEURO: Alert/oriented x 3/normal motor exam. No pathologic reflexes.    PSYCH: normal affect.        LABS:                        8.4    7.2   )-----------( 189      ( 13 Oct 2017 05:16 )             27.2     10-13    143  |  108<H>  |  63.0<H>  ----------------------------<  108  4.7   |  22.0  |  3.62<H>    Ca    8.0<L>      13 Oct 2017 05:16  Phos  4.0     10-13  Mg     2.4     10-13    TPro  6.1<L>  /  Alb  3.4  /  TBili  0.2<L>  /  DBili  x   /  AST  19  /  ALT  24  /  AlkPhos  82  10-11    CHRISTIANO ELIZABETH      PTT - ( 13 Oct 2017 05:16 )  PTT:29.1 sec      RADIOLOGY & ADDITIONAL STUDIES:    INTERPRETATION OF TELEMETRY (personally reviewed):       Assessment and Plan:  In summary, CHRISTIANO ELIZABETH is an 83y Male PMHX as per below who presented for CEA left side.  pt found to have severe carotid stenosis.  Pt underwent cea with no post op complications.  pt denies chest pain, palps, sob.     -continue current cardiac medications/dosages  -no further cardiac itnerventions are warranted at this time  -will arrange for outpt follow up     Thank you for allowing [facility] to participate in the care of this patient.  Please feel free to call with any questions or concerns.

## 2017-10-13 NOTE — DISCHARGE NOTE ADULT - PATIENT PORTAL LINK FT
“You can access the FollowHealth Patient Portal, offered by [facility], by registering with the following website: [email or web address]”

## 2017-10-16 ENCOUNTER — TRANSCRIPTION ENCOUNTER (OUTPATIENT)
Age: 82
End: 2017-10-16

## 2017-10-17 LAB — SURGICAL PATHOLOGY FINAL REPORT - CH: SIGNIFICANT CHANGE UP

## 2017-10-24 ENCOUNTER — APPOINTMENT (OUTPATIENT)
Dept: VASCULAR SURGERY | Facility: CLINIC | Age: 82
End: 2017-10-24
Payer: MEDICARE

## 2017-10-24 VITALS
WEIGHT: 180 LBS | SYSTOLIC BLOOD PRESSURE: 158 MMHG | HEART RATE: 65 BPM | DIASTOLIC BLOOD PRESSURE: 65 MMHG | RESPIRATION RATE: 14 BRPM | OXYGEN SATURATION: 98 % | HEIGHT: 66 IN | BODY MASS INDEX: 28.93 KG/M2 | TEMPERATURE: 98.4 F

## 2017-10-24 PROCEDURE — 99024 POSTOP FOLLOW-UP VISIT: CPT

## 2017-11-02 ENCOUNTER — RESULT CHARGE (OUTPATIENT)
Age: 82
End: 2017-11-02

## 2017-11-02 ENCOUNTER — APPOINTMENT (OUTPATIENT)
Dept: NEPHROLOGY | Facility: CLINIC | Age: 82
End: 2017-11-02
Payer: MEDICARE

## 2017-11-02 VITALS
WEIGHT: 179 LBS | SYSTOLIC BLOOD PRESSURE: 124 MMHG | DIASTOLIC BLOOD PRESSURE: 70 MMHG | BODY MASS INDEX: 28.77 KG/M2 | HEIGHT: 66 IN

## 2017-11-02 PROCEDURE — 85018 HEMOGLOBIN: CPT | Mod: QW

## 2017-11-02 PROCEDURE — 36415 COLL VENOUS BLD VENIPUNCTURE: CPT

## 2017-11-02 PROCEDURE — 99215 OFFICE O/P EST HI 40 MIN: CPT | Mod: 25

## 2017-11-02 RX ORDER — NIFEDIPINE 30 MG/1
30 TABLET, EXTENDED RELEASE ORAL
Qty: 90 | Refills: 1 | Status: DISCONTINUED | COMMUNITY
Start: 2017-06-16 | End: 2017-11-02

## 2017-11-03 ENCOUNTER — RESULT REVIEW (OUTPATIENT)
Age: 82
End: 2017-11-03

## 2017-11-03 LAB
25(OH)D3 SERPL-MCNC: 33.5 NG/ML
ALBUMIN SERPL ELPH-MCNC: 3.9 G/DL
ANION GAP SERPL CALC-SCNC: 20 MMOL/L
BASOPHILS # BLD AUTO: 0.07 K/UL
BASOPHILS NFR BLD AUTO: 0.7 %
BUN SERPL-MCNC: 66 MG/DL
CALCIUM SERPL-MCNC: 9.5 MG/DL
CALCIUM SERPL-MCNC: 9.5 MG/DL
CHLORIDE SERPL-SCNC: 101 MMOL/L
CO2 SERPL-SCNC: 22 MMOL/L
CREAT SERPL-MCNC: 3.44 MG/DL
CREAT SPEC-SCNC: 62 MG/DL
CREAT/PROT UR: 3.2 RATIO
EOSINOPHIL # BLD AUTO: 0.29 K/UL
EOSINOPHIL NFR BLD AUTO: 3 %
GLUCOSE SERPL-MCNC: 153 MG/DL
HBA1C MFR BLD HPLC: 5.3 %
HCT VFR BLD CALC: 34.5 %
HEMOGLOBIN: 10.7
HEMOGLOBIN: 9.7
HGB BLD-MCNC: 10.5 G/DL
IMM GRANULOCYTES NFR BLD AUTO: 0.2 %
LYMPHOCYTES # BLD AUTO: 1.46 K/UL
LYMPHOCYTES NFR BLD AUTO: 15.1 %
MAN DIFF?: NORMAL
MCHC RBC-ENTMCNC: 28.5 PG
MCHC RBC-ENTMCNC: 30.4 GM/DL
MCV RBC AUTO: 93.5 FL
MONOCYTES # BLD AUTO: 0.45 K/UL
MONOCYTES NFR BLD AUTO: 4.6 %
NEUTROPHILS # BLD AUTO: 7.4 K/UL
NEUTROPHILS NFR BLD AUTO: 76.4 %
PARATHYROID HORMONE INTACT: 100 PG/ML
PHOSPHATE SERPL-MCNC: 4.4 MG/DL
PLATELET # BLD AUTO: 214 K/UL
POTASSIUM SERPL-SCNC: 3.9 MMOL/L
PROT UR-MCNC: 196 MG/DL
RBC # BLD: 3.69 M/UL
RBC # FLD: 16 %
SODIUM SERPL-SCNC: 143 MMOL/L
URATE SERPL-MCNC: 7.5 MG/DL
WBC # FLD AUTO: 9.69 K/UL

## 2017-11-06 ENCOUNTER — APPOINTMENT (OUTPATIENT)
Dept: INTERNAL MEDICINE | Facility: CLINIC | Age: 82
End: 2017-11-06
Payer: MEDICARE

## 2017-11-06 PROCEDURE — 99214 OFFICE O/P EST MOD 30 MIN: CPT | Mod: 25

## 2017-11-06 PROCEDURE — G0008: CPT

## 2017-11-06 PROCEDURE — 90686 IIV4 VACC NO PRSV 0.5 ML IM: CPT

## 2017-11-21 ENCOUNTER — APPOINTMENT (OUTPATIENT)
Dept: VASCULAR SURGERY | Facility: CLINIC | Age: 82
End: 2017-11-21
Payer: MEDICARE

## 2017-11-21 VITALS
HEIGHT: 66 IN | OXYGEN SATURATION: 99 % | TEMPERATURE: 98.9 F | HEART RATE: 71 BPM | RESPIRATION RATE: 14 BRPM | BODY MASS INDEX: 28.12 KG/M2 | WEIGHT: 175 LBS | SYSTOLIC BLOOD PRESSURE: 151 MMHG | DIASTOLIC BLOOD PRESSURE: 61 MMHG

## 2017-11-21 PROCEDURE — 99024 POSTOP FOLLOW-UP VISIT: CPT

## 2017-12-04 ENCOUNTER — APPOINTMENT (OUTPATIENT)
Dept: NEPHROLOGY | Facility: CLINIC | Age: 82
End: 2017-12-04
Payer: MEDICARE

## 2017-12-04 VITALS
SYSTOLIC BLOOD PRESSURE: 140 MMHG | DIASTOLIC BLOOD PRESSURE: 70 MMHG | HEIGHT: 66 IN | WEIGHT: 175 LBS | BODY MASS INDEX: 28.12 KG/M2

## 2017-12-04 LAB — HEMOGLOBIN: 9.5

## 2017-12-04 PROCEDURE — 85018 HEMOGLOBIN: CPT | Mod: QW

## 2017-12-04 PROCEDURE — 36415 COLL VENOUS BLD VENIPUNCTURE: CPT

## 2017-12-04 PROCEDURE — 96372 THER/PROPH/DIAG INJ SC/IM: CPT

## 2017-12-04 PROCEDURE — 99215 OFFICE O/P EST HI 40 MIN: CPT | Mod: 25

## 2017-12-05 ENCOUNTER — RESULT REVIEW (OUTPATIENT)
Age: 82
End: 2017-12-05

## 2017-12-05 LAB
ALBUMIN SERPL ELPH-MCNC: 3.9 G/DL
ANION GAP SERPL CALC-SCNC: 15 MMOL/L
BUN SERPL-MCNC: 62 MG/DL
CALCIUM SERPL-MCNC: 9.3 MG/DL
CHLORIDE SERPL-SCNC: 105 MMOL/L
CO2 SERPL-SCNC: 26 MMOL/L
CREAT SERPL-MCNC: 3.24 MG/DL
GLUCOSE SERPL-MCNC: 116 MG/DL
PHOSPHATE SERPL-MCNC: 3.8 MG/DL
POTASSIUM SERPL-SCNC: 4.8 MMOL/L
SODIUM SERPL-SCNC: 146 MMOL/L

## 2017-12-29 ENCOUNTER — RX RENEWAL (OUTPATIENT)
Age: 82
End: 2017-12-29

## 2018-01-03 ENCOUNTER — APPOINTMENT (OUTPATIENT)
Dept: NEPHROLOGY | Facility: CLINIC | Age: 83
End: 2018-01-03
Payer: MEDICARE

## 2018-01-03 VITALS
SYSTOLIC BLOOD PRESSURE: 160 MMHG | DIASTOLIC BLOOD PRESSURE: 60 MMHG | BODY MASS INDEX: 28.12 KG/M2 | HEIGHT: 66 IN | WEIGHT: 175 LBS

## 2018-01-03 LAB — HEMOGLOBIN: 9.5

## 2018-01-03 PROCEDURE — 96372 THER/PROPH/DIAG INJ SC/IM: CPT

## 2018-01-03 PROCEDURE — 85018 HEMOGLOBIN: CPT | Mod: QW

## 2018-01-03 PROCEDURE — 36415 COLL VENOUS BLD VENIPUNCTURE: CPT

## 2018-01-03 PROCEDURE — 99215 OFFICE O/P EST HI 40 MIN: CPT | Mod: 25

## 2018-01-05 ENCOUNTER — RESULT REVIEW (OUTPATIENT)
Age: 83
End: 2018-01-05

## 2018-01-05 ENCOUNTER — APPOINTMENT (OUTPATIENT)
Dept: VASCULAR SURGERY | Facility: CLINIC | Age: 83
End: 2018-01-05

## 2018-01-05 LAB
25(OH)D3 SERPL-MCNC: 31.2 NG/ML
ALBUMIN SERPL ELPH-MCNC: 4.1 G/DL
ANION GAP SERPL CALC-SCNC: 17 MMOL/L
BASOPHILS # BLD AUTO: 0.07 K/UL
BASOPHILS NFR BLD AUTO: 1 %
BUN SERPL-MCNC: 68 MG/DL
CALCIUM SERPL-MCNC: 9.8 MG/DL
CALCIUM SERPL-MCNC: 9.8 MG/DL
CHLORIDE SERPL-SCNC: 104 MMOL/L
CO2 SERPL-SCNC: 24 MMOL/L
CREAT SERPL-MCNC: 3.61 MG/DL
EOSINOPHIL # BLD AUTO: 0.16 K/UL
EOSINOPHIL NFR BLD AUTO: 2.2
GLUCOSE SERPL-MCNC: 119 MG/DL
HCT VFR BLD CALC: 31.7 %
HGB BLD-MCNC: 9.8 G/DL
IMM GRANULOCYTES NFR BLD AUTO: 0.3 %
LYMPHOCYTES # BLD AUTO: 1.66 K/UL
LYMPHOCYTES NFR BLD AUTO: 23.3 %
MAN DIFF?: NORMAL
MCHC RBC-ENTMCNC: 29.3 PG
MCHC RBC-ENTMCNC: 30.9 GM/DL
MCV RBC AUTO: 94.6 FL
MONOCYTES # BLD AUTO: 0.39 K/UL
MONOCYTES NFR BLD AUTO: 5.5 %
NEUTROPHILS # BLD AUTO: 4.82 K/UL
NEUTROPHILS NFR BLD AUTO: 67.7 %
PARATHYROID HORMONE INTACT: 53 PG/ML
PHOSPHATE SERPL-MCNC: 4.2 MG/DL
PLATELET # BLD AUTO: 224 K/UL
POTASSIUM SERPL-SCNC: 4.6 MMOL/L
RBC # BLD: 3.35 M/UL
RBC # FLD: 16.9 %
SODIUM SERPL-SCNC: 145 MMOL/L
WBC # FLD AUTO: 7.12 K/UL

## 2018-01-05 RX ORDER — ERYTHROPOIETIN 40000 [IU]/ML
40000 INJECTION, SOLUTION INTRAVENOUS; SUBCUTANEOUS
Qty: 1 | Refills: 0 | Status: COMPLETED | COMMUNITY
Start: 2018-01-03

## 2018-01-05 RX ORDER — ERYTHROPOIETIN 40000 [IU]/ML
40000 INJECTION, SOLUTION INTRAVENOUS; SUBCUTANEOUS
Qty: 1 | Refills: 0 | Status: COMPLETED | COMMUNITY
Start: 2017-12-04

## 2018-01-12 ENCOUNTER — APPOINTMENT (OUTPATIENT)
Dept: VASCULAR SURGERY | Facility: CLINIC | Age: 83
End: 2018-01-12
Payer: MEDICARE

## 2018-01-12 VITALS
BODY MASS INDEX: 27.97 KG/M2 | RESPIRATION RATE: 14 BRPM | DIASTOLIC BLOOD PRESSURE: 63 MMHG | WEIGHT: 174 LBS | SYSTOLIC BLOOD PRESSURE: 158 MMHG | TEMPERATURE: 98.4 F | HEIGHT: 66 IN | OXYGEN SATURATION: 96 % | HEART RATE: 74 BPM

## 2018-01-12 PROCEDURE — 93880 EXTRACRANIAL BILAT STUDY: CPT

## 2018-01-12 PROCEDURE — 99024 POSTOP FOLLOW-UP VISIT: CPT

## 2018-02-01 ENCOUNTER — EMERGENCY (EMERGENCY)
Facility: HOSPITAL | Age: 83
LOS: 1 days | Discharge: HOME | End: 2018-02-01
Attending: EMERGENCY MEDICINE | Admitting: EMERGENCY MEDICINE
Payer: MEDICARE

## 2018-02-01 VITALS
HEART RATE: 62 BPM | WEIGHT: 175.05 LBS | DIASTOLIC BLOOD PRESSURE: 86 MMHG | RESPIRATION RATE: 16 BRPM | SYSTOLIC BLOOD PRESSURE: 173 MMHG | HEIGHT: 63 IN | OXYGEN SATURATION: 99 % | TEMPERATURE: 97 F

## 2018-02-01 DIAGNOSIS — Z98.62 PERIPHERAL VASCULAR ANGIOPLASTY STATUS: Chronic | ICD-10-CM

## 2018-02-01 DIAGNOSIS — Z98.890 OTHER SPECIFIED POSTPROCEDURAL STATES: Chronic | ICD-10-CM

## 2018-02-01 DIAGNOSIS — I77.0 ARTERIOVENOUS FISTULA, ACQUIRED: Chronic | ICD-10-CM

## 2018-02-01 LAB
ALBUMIN SERPL ELPH-MCNC: 3.6 G/DL — SIGNIFICANT CHANGE UP (ref 3.3–5.2)
ALP SERPL-CCNC: 98 U/L — SIGNIFICANT CHANGE UP (ref 40–120)
ALT FLD-CCNC: 29 U/L — SIGNIFICANT CHANGE UP
ANION GAP SERPL CALC-SCNC: 18 MMOL/L — HIGH (ref 5–17)
APPEARANCE UR: CLEAR — SIGNIFICANT CHANGE UP
AST SERPL-CCNC: 20 U/L — SIGNIFICANT CHANGE UP
BASOPHILS # BLD AUTO: 0 K/UL — SIGNIFICANT CHANGE UP (ref 0–0.2)
BASOPHILS NFR BLD AUTO: 0.5 % — SIGNIFICANT CHANGE UP (ref 0–2)
BILIRUB SERPL-MCNC: <0.2 MG/DL — LOW (ref 0.4–2)
BILIRUB UR-MCNC: NEGATIVE — SIGNIFICANT CHANGE UP
BUN SERPL-MCNC: 71 MG/DL — HIGH (ref 8–20)
CALCIUM SERPL-MCNC: 9.2 MG/DL — SIGNIFICANT CHANGE UP (ref 8.6–10.2)
CHLORIDE SERPL-SCNC: 103 MMOL/L — SIGNIFICANT CHANGE UP (ref 98–107)
CK SERPL-CCNC: 85 U/L — SIGNIFICANT CHANGE UP (ref 30–200)
CO2 SERPL-SCNC: 23 MMOL/L — SIGNIFICANT CHANGE UP (ref 22–29)
COLOR SPEC: YELLOW — SIGNIFICANT CHANGE UP
CREAT SERPL-MCNC: 3.82 MG/DL — HIGH (ref 0.5–1.3)
D DIMER BLD IA.RAPID-MCNC: 167 NG/ML DDU — SIGNIFICANT CHANGE UP
DIFF PNL FLD: ABNORMAL
EOSINOPHIL # BLD AUTO: 0.2 K/UL — SIGNIFICANT CHANGE UP (ref 0–0.5)
EOSINOPHIL NFR BLD AUTO: 2.5 % — SIGNIFICANT CHANGE UP (ref 0–5)
EPI CELLS # UR: SIGNIFICANT CHANGE UP
GLUCOSE SERPL-MCNC: 108 MG/DL — SIGNIFICANT CHANGE UP (ref 70–115)
GLUCOSE UR QL: NEGATIVE MG/DL — SIGNIFICANT CHANGE UP
HCT VFR BLD CALC: 27.7 % — LOW (ref 42–52)
HGB BLD-MCNC: 9 G/DL — LOW (ref 14–18)
KETONES UR-MCNC: NEGATIVE — SIGNIFICANT CHANGE UP
LEUKOCYTE ESTERASE UR-ACNC: NEGATIVE — SIGNIFICANT CHANGE UP
LYMPHOCYTES # BLD AUTO: 1.2 K/UL — SIGNIFICANT CHANGE UP (ref 1–4.8)
LYMPHOCYTES # BLD AUTO: 15.5 % — LOW (ref 20–55)
MCHC RBC-ENTMCNC: 29.7 PG — SIGNIFICANT CHANGE UP (ref 27–31)
MCHC RBC-ENTMCNC: 32.5 G/DL — SIGNIFICANT CHANGE UP (ref 32–36)
MCV RBC AUTO: 91.4 FL — SIGNIFICANT CHANGE UP (ref 80–94)
MONOCYTES # BLD AUTO: 0.7 K/UL — SIGNIFICANT CHANGE UP (ref 0–0.8)
MONOCYTES NFR BLD AUTO: 8.3 % — SIGNIFICANT CHANGE UP (ref 3–10)
NEUTROPHILS # BLD AUTO: 5.8 K/UL — SIGNIFICANT CHANGE UP (ref 1.8–8)
NEUTROPHILS NFR BLD AUTO: 72.8 % — SIGNIFICANT CHANGE UP (ref 37–73)
NITRITE UR-MCNC: NEGATIVE — SIGNIFICANT CHANGE UP
NT-PROBNP SERPL-SCNC: 1202 PG/ML — HIGH (ref 0–300)
PH UR: 6 — SIGNIFICANT CHANGE UP (ref 5–8)
PLATELET # BLD AUTO: 213 K/UL — SIGNIFICANT CHANGE UP (ref 150–400)
POTASSIUM SERPL-MCNC: 4.5 MMOL/L — SIGNIFICANT CHANGE UP (ref 3.5–5.3)
POTASSIUM SERPL-SCNC: 4.5 MMOL/L — SIGNIFICANT CHANGE UP (ref 3.5–5.3)
PROT SERPL-MCNC: 6.4 G/DL — LOW (ref 6.6–8.7)
PROT UR-MCNC: 100 MG/DL
RBC # BLD: 3.03 M/UL — LOW (ref 4.6–6.2)
RBC # FLD: 15.4 % — SIGNIFICANT CHANGE UP (ref 11–15.6)
RBC CASTS # UR COMP ASSIST: SIGNIFICANT CHANGE UP /HPF (ref 0–4)
SODIUM SERPL-SCNC: 144 MMOL/L — SIGNIFICANT CHANGE UP (ref 135–145)
SP GR SPEC: 1.01 — SIGNIFICANT CHANGE UP (ref 1.01–1.02)
TROPONIN T SERPL-MCNC: 0.02 NG/ML — SIGNIFICANT CHANGE UP (ref 0–0.06)
UROBILINOGEN FLD QL: NEGATIVE MG/DL — SIGNIFICANT CHANGE UP
WBC # BLD: 7.9 K/UL — SIGNIFICANT CHANGE UP (ref 4.8–10.8)
WBC # FLD AUTO: 7.9 K/UL — SIGNIFICANT CHANGE UP (ref 4.8–10.8)
WBC UR QL: SIGNIFICANT CHANGE UP

## 2018-02-01 PROCEDURE — 99285 EMERGENCY DEPT VISIT HI MDM: CPT

## 2018-02-01 PROCEDURE — 36415 COLL VENOUS BLD VENIPUNCTURE: CPT

## 2018-02-01 PROCEDURE — 81001 URINALYSIS AUTO W/SCOPE: CPT

## 2018-02-01 PROCEDURE — 96372 THER/PROPH/DIAG INJ SC/IM: CPT

## 2018-02-01 PROCEDURE — 82550 ASSAY OF CK (CPK): CPT

## 2018-02-01 PROCEDURE — 71045 X-RAY EXAM CHEST 1 VIEW: CPT | Mod: 26

## 2018-02-01 PROCEDURE — 85027 COMPLETE CBC AUTOMATED: CPT

## 2018-02-01 PROCEDURE — 83880 ASSAY OF NATRIURETIC PEPTIDE: CPT

## 2018-02-01 PROCEDURE — 84484 ASSAY OF TROPONIN QUANT: CPT

## 2018-02-01 PROCEDURE — 93971 EXTREMITY STUDY: CPT

## 2018-02-01 PROCEDURE — 80053 COMPREHEN METABOLIC PANEL: CPT

## 2018-02-01 PROCEDURE — 93971 EXTREMITY STUDY: CPT | Mod: 26,LT

## 2018-02-01 PROCEDURE — 71045 X-RAY EXAM CHEST 1 VIEW: CPT

## 2018-02-01 PROCEDURE — 85379 FIBRIN DEGRADATION QUANT: CPT

## 2018-02-01 PROCEDURE — 99284 EMERGENCY DEPT VISIT MOD MDM: CPT | Mod: 25

## 2018-02-01 RX ORDER — ERYTHROPOIETIN 10000 [IU]/ML
20000 INJECTION, SOLUTION INTRAVENOUS; SUBCUTANEOUS ONCE
Qty: 0 | Refills: 0 | Status: COMPLETED | OUTPATIENT
Start: 2018-02-01 | End: 2018-02-01

## 2018-02-01 RX ADMIN — ERYTHROPOIETIN 20000 UNIT(S): 10000 INJECTION, SOLUTION INTRAVENOUS; SUBCUTANEOUS at 22:24

## 2018-02-01 NOTE — ED ADULT NURSE NOTE - DRUG PRE-SCREENING (DAST -1)
Problem: Falls - Risk of  Goal: *Absence of Falls  Document Maryse Fall Risk and appropriate interventions in the flowsheet.    Outcome: Progressing Towards Goal  Fall Risk Interventions:              Medication Interventions: Evaluate medications/consider consulting pharmacy, Patient to call before getting OOB, Teach patient to arise slowly           History of Falls Interventions: Consult care management for discharge planning Statement Selected

## 2018-02-01 NOTE — ED PROVIDER NOTE - OBJECTIVE STATEMENT
This is a 84 y/o M PMHx anemia, arrhythmia, AV block, CAD, CKD, DM, RICHARDS and HTN presents to ED c/o medical evaluation. Daughter reports patients wife was at the cardiologist for a full work up, Dr. Angel saw pt and was concerned he appeared unwell, told pt to come to ED for labs. Pt is on Procrit once a month and a water pill. Pt voicing no complaints. Denies N/V/D, fever, chills, SOB, CP, difficulty breathing, HA, numbness, tingling and abd pain.   Cardiologist: Dr. Peterson, Dr. Sorenson and Dr. Avendano  PCP: Dr. Spivey

## 2018-02-01 NOTE — ED ADULT NURSE NOTE - PSH
A-V fistula  left arm 5/2017  H/O angioplasty  2013,  no  intervention  H/O circumcision  at  age  65  H/O left knee surgery

## 2018-02-01 NOTE — ED PROVIDER NOTE - PROGRESS NOTE DETAILS
Labs as noted.  Doppler neg for DVT.  Case d/w Renal Dr. Avendano and requesting pt to receive Procrit 20,000 units.  Pt stable for d/c with Cardio f/u as outpt

## 2018-02-01 NOTE — ED PROVIDER NOTE - ENMT, MLM
Airway patent, Nasal mucosa clear. Mouth with normal mucosa. Uvula is midline. Neck is supple. No JVD.

## 2018-02-01 NOTE — ED PROVIDER NOTE - MUSCULOSKELETAL, MLM
Spine appears normal, range of motion is not limited. LLE swelling. Fistula to L forearm with no thrill and no bruit.

## 2018-02-01 NOTE — ED ADULT TRIAGE NOTE - CHIEF COMPLAINT QUOTE
pt presents to ED with sent by cardiologist. dr kraft did not like the way the pt looks. pt's wife had appt with cardio today.  pt denies chest pain/sob. pt c.o fatigue. pt receives procrit shots, pt appears pale. breathing is even and unlabored.

## 2018-02-09 ENCOUNTER — APPOINTMENT (OUTPATIENT)
Dept: NEPHROLOGY | Facility: CLINIC | Age: 83
End: 2018-02-09
Payer: MEDICARE

## 2018-02-09 VITALS
SYSTOLIC BLOOD PRESSURE: 180 MMHG | WEIGHT: 174 LBS | HEIGHT: 66 IN | DIASTOLIC BLOOD PRESSURE: 70 MMHG | BODY MASS INDEX: 27.97 KG/M2

## 2018-02-09 PROCEDURE — 99215 OFFICE O/P EST HI 40 MIN: CPT | Mod: 25

## 2018-02-09 PROCEDURE — 96372 THER/PROPH/DIAG INJ SC/IM: CPT

## 2018-02-09 PROCEDURE — 85018 HEMOGLOBIN: CPT | Mod: QW

## 2018-02-09 PROCEDURE — 36415 COLL VENOUS BLD VENIPUNCTURE: CPT

## 2018-02-12 ENCOUNTER — APPOINTMENT (OUTPATIENT)
Dept: INTERNAL MEDICINE | Facility: CLINIC | Age: 83
End: 2018-02-12

## 2018-02-12 LAB
ALBUMIN SERPL ELPH-MCNC: 4 G/DL
ANION GAP SERPL CALC-SCNC: 19 MMOL/L
BUN SERPL-MCNC: 63 MG/DL
CALCIUM SERPL-MCNC: 9.5 MG/DL
CHLORIDE SERPL-SCNC: 102 MMOL/L
CO2 SERPL-SCNC: 23 MMOL/L
CREAT SERPL-MCNC: 3.9 MG/DL
GLUCOSE SERPL-MCNC: 111 MG/DL
PHOSPHATE SERPL-MCNC: 4.8 MG/DL
POTASSIUM SERPL-SCNC: 4.6 MMOL/L
SODIUM SERPL-SCNC: 144 MMOL/L

## 2018-02-13 ENCOUNTER — RESULT CHARGE (OUTPATIENT)
Age: 83
End: 2018-02-13

## 2018-02-14 LAB — HEMOGLOBIN: 9.6

## 2018-02-22 ENCOUNTER — RESULT CHARGE (OUTPATIENT)
Age: 83
End: 2018-02-22

## 2018-02-23 ENCOUNTER — RESULT CHARGE (OUTPATIENT)
Age: 83
End: 2018-02-23

## 2018-02-23 ENCOUNTER — APPOINTMENT (OUTPATIENT)
Dept: NEPHROLOGY | Facility: CLINIC | Age: 83
End: 2018-02-23
Payer: MEDICARE

## 2018-02-23 VITALS
BODY MASS INDEX: 28.12 KG/M2 | HEIGHT: 66 IN | WEIGHT: 175 LBS | SYSTOLIC BLOOD PRESSURE: 144 MMHG | DIASTOLIC BLOOD PRESSURE: 60 MMHG

## 2018-02-23 LAB — HEMOGLOBIN: 11

## 2018-02-23 PROCEDURE — 96372 THER/PROPH/DIAG INJ SC/IM: CPT

## 2018-02-23 PROCEDURE — 36415 COLL VENOUS BLD VENIPUNCTURE: CPT

## 2018-02-23 PROCEDURE — 99215 OFFICE O/P EST HI 40 MIN: CPT | Mod: 25

## 2018-02-26 ENCOUNTER — RESULT REVIEW (OUTPATIENT)
Age: 83
End: 2018-02-26

## 2018-02-26 LAB
25(OH)D3 SERPL-MCNC: 38.7 NG/ML
ALBUMIN SERPL ELPH-MCNC: 4 G/DL
ANION GAP SERPL CALC-SCNC: 20 MMOL/L
APPEARANCE: CLEAR
BACTERIA: NEGATIVE
BASOPHILS # BLD AUTO: 0.12 K/UL
BASOPHILS NFR BLD AUTO: 1.3 %
BILIRUBIN URINE: NEGATIVE
BLOOD URINE: NEGATIVE
BUN SERPL-MCNC: 79 MG/DL
CALCIUM SERPL-MCNC: 9 MG/DL
CALCIUM SERPL-MCNC: 9 MG/DL
CHLORIDE SERPL-SCNC: 102 MMOL/L
CO2 SERPL-SCNC: 21 MMOL/L
COLOR: YELLOW
CREAT SERPL-MCNC: 3.5 MG/DL
CREAT SPEC-SCNC: 53 MG/DL
CREAT/PROT UR: 3.4 RATIO
EOSINOPHIL # BLD AUTO: 0.21 K/UL
EOSINOPHIL NFR BLD AUTO: 2.3 %
GLUCOSE QUALITATIVE U: 100 MG/DL
GLUCOSE SERPL-MCNC: 152 MG/DL
HBA1C MFR BLD HPLC: 4.8 %
HCT VFR BLD CALC: 36.2 %
HGB BLD-MCNC: 11.1 G/DL
HYALINE CASTS: 4 /LPF
IMM GRANULOCYTES NFR BLD AUTO: 0.2 %
KETONES URINE: NEGATIVE
LEUKOCYTE ESTERASE URINE: NEGATIVE
LYMPHOCYTES # BLD AUTO: 1.13 K/UL
LYMPHOCYTES NFR BLD AUTO: 12.3 %
MAN DIFF?: NORMAL
MCHC RBC-ENTMCNC: 29.6 PG
MCHC RBC-ENTMCNC: 30.7 GM/DL
MCV RBC AUTO: 96.5 FL
MICROSCOPIC-UA: NORMAL
MONOCYTES # BLD AUTO: 0.79 K/UL
MONOCYTES NFR BLD AUTO: 8.6 %
NEUTROPHILS # BLD AUTO: 6.95 K/UL
NEUTROPHILS NFR BLD AUTO: 75.3 %
NITRITE URINE: NEGATIVE
PARATHYROID HORMONE INTACT: 70 PG/ML
PH URINE: 5.5
PHOSPHATE SERPL-MCNC: 5.2 MG/DL
PLATELET # BLD AUTO: 213 K/UL
POTASSIUM SERPL-SCNC: 4.8 MMOL/L
PROT UR-MCNC: 179 MG/DL
PROTEIN URINE: 300 MG/DL
RBC # BLD: 3.75 M/UL
RBC # FLD: 16.4 %
RED BLOOD CELLS URINE: 1 /HPF
SODIUM SERPL-SCNC: 143 MMOL/L
SPECIFIC GRAVITY URINE: 1.01
SQUAMOUS EPITHELIAL CELLS: 2 /HPF
UROBILINOGEN URINE: NEGATIVE MG/DL
WBC # FLD AUTO: 9.22 K/UL
WHITE BLOOD CELLS URINE: 2 /HPF

## 2018-02-27 ENCOUNTER — APPOINTMENT (OUTPATIENT)
Dept: INTERNAL MEDICINE | Facility: CLINIC | Age: 83
End: 2018-02-27
Payer: MEDICARE

## 2018-02-27 PROCEDURE — 99215 OFFICE O/P EST HI 40 MIN: CPT

## 2018-03-02 ENCOUNTER — RESULT REVIEW (OUTPATIENT)
Age: 83
End: 2018-03-02

## 2018-03-02 LAB — HEMOGLOBIN: 11

## 2018-03-06 ENCOUNTER — APPOINTMENT (OUTPATIENT)
Dept: VASCULAR SURGERY | Facility: CLINIC | Age: 83
End: 2018-03-06

## 2018-03-16 ENCOUNTER — APPOINTMENT (OUTPATIENT)
Dept: VASCULAR SURGERY | Facility: CLINIC | Age: 83
End: 2018-03-16
Payer: MEDICARE

## 2018-03-16 VITALS
HEIGHT: 63 IN | WEIGHT: 173 LBS | DIASTOLIC BLOOD PRESSURE: 65 MMHG | RESPIRATION RATE: 14 BRPM | TEMPERATURE: 98 F | BODY MASS INDEX: 30.65 KG/M2 | OXYGEN SATURATION: 97 % | SYSTOLIC BLOOD PRESSURE: 173 MMHG | HEART RATE: 76 BPM

## 2018-03-16 PROCEDURE — 99214 OFFICE O/P EST MOD 30 MIN: CPT

## 2018-03-26 ENCOUNTER — APPOINTMENT (OUTPATIENT)
Dept: NEPHROLOGY | Facility: CLINIC | Age: 83
End: 2018-03-26
Payer: MEDICARE

## 2018-03-26 VITALS
HEIGHT: 64.5 IN | BODY MASS INDEX: 29.35 KG/M2 | SYSTOLIC BLOOD PRESSURE: 138 MMHG | WEIGHT: 174 LBS | HEART RATE: 68 BPM | DIASTOLIC BLOOD PRESSURE: 70 MMHG

## 2018-03-26 LAB — HEMOGLOBIN: 9

## 2018-03-26 PROCEDURE — 96372 THER/PROPH/DIAG INJ SC/IM: CPT

## 2018-03-26 PROCEDURE — 99215 OFFICE O/P EST HI 40 MIN: CPT | Mod: 25

## 2018-03-26 PROCEDURE — 36415 COLL VENOUS BLD VENIPUNCTURE: CPT

## 2018-03-26 PROCEDURE — 85018 HEMOGLOBIN: CPT | Mod: QW

## 2018-03-26 RX ORDER — TAMSULOSIN HYDROCHLORIDE 0.4 MG/1
0.4 CAPSULE ORAL
Qty: 30 | Refills: 0 | Status: DISCONTINUED | COMMUNITY
Start: 2017-10-13 | End: 2018-03-26

## 2018-03-26 RX ORDER — ERYTHROPOIETIN 40000 [IU]/ML
40000 INJECTION, SOLUTION INTRAVENOUS; SUBCUTANEOUS
Qty: 1 | Refills: 0 | Status: COMPLETED | OUTPATIENT
Start: 2018-03-26

## 2018-03-26 RX ADMIN — ERYTHROPOIETIN 1 UNIT/ML: 40000 INJECTION, SOLUTION INTRAVENOUS; SUBCUTANEOUS at 00:00

## 2018-03-27 ENCOUNTER — APPOINTMENT (OUTPATIENT)
Dept: INTERNAL MEDICINE | Facility: CLINIC | Age: 83
End: 2018-03-27

## 2018-03-27 LAB
ALBUMIN SERPL ELPH-MCNC: 3.9 G/DL
ANION GAP SERPL CALC-SCNC: 17 MMOL/L
BUN SERPL-MCNC: 81 MG/DL
CALCIUM SERPL-MCNC: 9.4 MG/DL
CHLORIDE SERPL-SCNC: 106 MMOL/L
CO2 SERPL-SCNC: 19 MMOL/L
CREAT SERPL-MCNC: 3.39 MG/DL
GLUCOSE SERPL-MCNC: 93 MG/DL
PHOSPHATE SERPL-MCNC: 4.3 MG/DL
POTASSIUM SERPL-SCNC: 4.4 MMOL/L
SODIUM SERPL-SCNC: 142 MMOL/L

## 2018-03-28 ENCOUNTER — APPOINTMENT (OUTPATIENT)
Dept: VASCULAR SURGERY | Facility: CLINIC | Age: 83
End: 2018-03-28
Payer: MEDICARE

## 2018-03-28 VITALS
SYSTOLIC BLOOD PRESSURE: 162 MMHG | DIASTOLIC BLOOD PRESSURE: 70 MMHG | OXYGEN SATURATION: 99 % | HEART RATE: 71 BPM | RESPIRATION RATE: 14 BRPM | TEMPERATURE: 97.7 F

## 2018-03-28 PROCEDURE — 99214 OFFICE O/P EST MOD 30 MIN: CPT

## 2018-04-17 ENCOUNTER — APPOINTMENT (OUTPATIENT)
Dept: VASCULAR SURGERY | Facility: CLINIC | Age: 83
End: 2018-04-17

## 2018-04-23 ENCOUNTER — APPOINTMENT (OUTPATIENT)
Dept: NEPHROLOGY | Facility: CLINIC | Age: 83
End: 2018-04-23
Payer: MEDICARE

## 2018-04-23 VITALS
HEIGHT: 64.5 IN | HEART RATE: 65 BPM | WEIGHT: 178 LBS | SYSTOLIC BLOOD PRESSURE: 155 MMHG | DIASTOLIC BLOOD PRESSURE: 62 MMHG | BODY MASS INDEX: 30.02 KG/M2

## 2018-04-23 LAB — HEMOGLOBIN: 9.1

## 2018-04-23 PROCEDURE — 96372 THER/PROPH/DIAG INJ SC/IM: CPT

## 2018-04-23 PROCEDURE — 36415 COLL VENOUS BLD VENIPUNCTURE: CPT

## 2018-04-23 PROCEDURE — 99215 OFFICE O/P EST HI 40 MIN: CPT | Mod: 25

## 2018-04-23 PROCEDURE — 85018 HEMOGLOBIN: CPT | Mod: QW

## 2018-04-23 RX ORDER — ERYTHROPOIETIN 40000 [IU]/ML
40000 INJECTION, SOLUTION INTRAVENOUS; SUBCUTANEOUS
Qty: 1 | Refills: 0 | Status: COMPLETED | OUTPATIENT
Start: 2018-04-23

## 2018-04-24 LAB
ALBUMIN SERPL ELPH-MCNC: 3.9 G/DL
ANION GAP SERPL CALC-SCNC: 18 MMOL/L
BUN SERPL-MCNC: 65 MG/DL
CALCIUM SERPL-MCNC: 9.5 MG/DL
CHLORIDE SERPL-SCNC: 102 MMOL/L
CO2 SERPL-SCNC: 24 MMOL/L
CREAT SERPL-MCNC: 3.32 MG/DL
GLUCOSE SERPL-MCNC: 142 MG/DL
PHOSPHATE SERPL-MCNC: 4.3 MG/DL
POTASSIUM SERPL-SCNC: 4.3 MMOL/L
SODIUM SERPL-SCNC: 144 MMOL/L

## 2018-04-27 ENCOUNTER — OUTPATIENT (OUTPATIENT)
Dept: OUTPATIENT SERVICES | Facility: HOSPITAL | Age: 83
LOS: 1 days | End: 2018-04-27
Payer: MEDICARE

## 2018-04-27 VITALS
RESPIRATION RATE: 16 BRPM | HEIGHT: 65 IN | TEMPERATURE: 98 F | HEART RATE: 70 BPM | SYSTOLIC BLOOD PRESSURE: 158 MMHG | DIASTOLIC BLOOD PRESSURE: 81 MMHG | WEIGHT: 175.49 LBS

## 2018-04-27 DIAGNOSIS — Z98.890 OTHER SPECIFIED POSTPROCEDURAL STATES: Chronic | ICD-10-CM

## 2018-04-27 DIAGNOSIS — Z29.9 ENCOUNTER FOR PROPHYLACTIC MEASURES, UNSPECIFIED: ICD-10-CM

## 2018-04-27 DIAGNOSIS — Z98.62 PERIPHERAL VASCULAR ANGIOPLASTY STATUS: Chronic | ICD-10-CM

## 2018-04-27 DIAGNOSIS — N18.6 END STAGE RENAL DISEASE: ICD-10-CM

## 2018-04-27 DIAGNOSIS — I77.0 ARTERIOVENOUS FISTULA, ACQUIRED: Chronic | ICD-10-CM

## 2018-04-27 DIAGNOSIS — E11.9 TYPE 2 DIABETES MELLITUS WITHOUT COMPLICATIONS: ICD-10-CM

## 2018-04-27 DIAGNOSIS — I25.10 ATHEROSCLEROTIC HEART DISEASE OF NATIVE CORONARY ARTERY WITHOUT ANGINA PECTORIS: ICD-10-CM

## 2018-04-27 DIAGNOSIS — Z01.818 ENCOUNTER FOR OTHER PREPROCEDURAL EXAMINATION: ICD-10-CM

## 2018-04-27 LAB
ANION GAP SERPL CALC-SCNC: 18 MMOL/L — HIGH (ref 5–17)
APTT BLD: 34.6 SEC — SIGNIFICANT CHANGE UP (ref 27.5–37.4)
BUN SERPL-MCNC: 57 MG/DL — HIGH (ref 8–20)
CALCIUM SERPL-MCNC: 10.3 MG/DL — HIGH (ref 8.6–10.2)
CHLORIDE SERPL-SCNC: 101 MMOL/L — SIGNIFICANT CHANGE UP (ref 98–107)
CO2 SERPL-SCNC: 25 MMOL/L — SIGNIFICANT CHANGE UP (ref 22–29)
CREAT SERPL-MCNC: 3 MG/DL — HIGH (ref 0.5–1.3)
GLUCOSE SERPL-MCNC: 90 MG/DL — SIGNIFICANT CHANGE UP (ref 70–115)
HCT VFR BLD CALC: 35.7 % — LOW (ref 42–52)
HGB BLD-MCNC: 11.2 G/DL — LOW (ref 14–18)
INR BLD: 1 RATIO — SIGNIFICANT CHANGE UP (ref 0.88–1.16)
MCHC RBC-ENTMCNC: 29.2 PG — SIGNIFICANT CHANGE UP (ref 27–31)
MCHC RBC-ENTMCNC: 31.4 G/DL — LOW (ref 32–36)
MCV RBC AUTO: 93.2 FL — SIGNIFICANT CHANGE UP (ref 80–94)
PLATELET # BLD AUTO: 248 K/UL — SIGNIFICANT CHANGE UP (ref 150–400)
POTASSIUM SERPL-MCNC: 4.4 MMOL/L — SIGNIFICANT CHANGE UP (ref 3.5–5.3)
POTASSIUM SERPL-SCNC: 4.4 MMOL/L — SIGNIFICANT CHANGE UP (ref 3.5–5.3)
PROTHROM AB SERPL-ACNC: 11 SEC — SIGNIFICANT CHANGE UP (ref 9.8–12.7)
RBC # BLD: 3.83 M/UL — LOW (ref 4.6–6.2)
RBC # FLD: 15.4 % — SIGNIFICANT CHANGE UP (ref 11–15.6)
SODIUM SERPL-SCNC: 144 MMOL/L — SIGNIFICANT CHANGE UP (ref 135–145)
WBC # BLD: 10.4 K/UL — SIGNIFICANT CHANGE UP (ref 4.8–10.8)
WBC # FLD AUTO: 10.4 K/UL — SIGNIFICANT CHANGE UP (ref 4.8–10.8)

## 2018-04-27 PROCEDURE — 83036 HEMOGLOBIN GLYCOSYLATED A1C: CPT

## 2018-04-27 PROCEDURE — 85610 PROTHROMBIN TIME: CPT

## 2018-04-27 PROCEDURE — G0463: CPT

## 2018-04-27 PROCEDURE — 80048 BASIC METABOLIC PNL TOTAL CA: CPT

## 2018-04-27 PROCEDURE — 36415 COLL VENOUS BLD VENIPUNCTURE: CPT

## 2018-04-27 PROCEDURE — 85730 THROMBOPLASTIN TIME PARTIAL: CPT

## 2018-04-27 PROCEDURE — 85027 COMPLETE CBC AUTOMATED: CPT

## 2018-04-27 RX ORDER — SODIUM CHLORIDE 9 MG/ML
3 INJECTION INTRAMUSCULAR; INTRAVENOUS; SUBCUTANEOUS ONCE
Qty: 0 | Refills: 0 | Status: DISCONTINUED | OUTPATIENT
Start: 2018-05-04 | End: 2018-05-19

## 2018-04-27 RX ORDER — CEFAZOLIN SODIUM 1 G
2000 VIAL (EA) INJECTION ONCE
Qty: 0 | Refills: 0 | Status: DISCONTINUED | OUTPATIENT
Start: 2018-05-04 | End: 2018-05-04

## 2018-04-27 NOTE — H&P PST ADULT - HISTORY OF PRESENT ILLNESS
This is an 83 y.o male who presents to PST today.  The pt is scheduled for a LUE fistulogram and possible revision of AV fistula.  He reports this was previously placed in anticipation of the possibility of dialysis.

## 2018-04-27 NOTE — H&P PST ADULT - PSH
A-V fistula  left arm 5/2017  H/O angioplasty  2013,  no  intervention  H/O carotid endarterectomy  Right  H/O circumcision  at  age  65  H/O left knee surgery

## 2018-04-27 NOTE — H&P PST ADULT - ASSESSMENT
CAPRINI SCORE [CLOT]    AGE RELATED RISK FACTORS                                                       MOBILITY RELATED FACTORS  [ ] Age 41-60 years                                            (1 Point)                  [ ] Bed rest                                                        (1 Point)  [] Age: 61-74 years                                           (2 Points)                 [ ] Plaster cast                                                   (2 Points)  [ ] Age= 75 years                                              (3 Points)                 [ ] Bed bound for more than 72 hours                 (2 Points)    DISEASE RELATED RISK FACTORS                                               GENDER SPECIFIC FACTORS  [ x] Edema in the lower extremities                       (1 Point)                  [ ] Pregnancy                                                     (1 Point)  [ ] Varicose veins                                               (1 Point)                  [ ] Post-partum < 6 weeks                                   (1 Point)             [x ] BMI > 25 Kg/m2                                            (1 Point)                  [ ] Hormonal therapy  or oral contraception          (1 Point)                 [ ] Sepsis (in the previous month)                        (1 Point)                  [ ] History of pregnancy complications                 (1 point)  [ ] Pneumonia or serious lung disease                                               [ ] Unexplained or recurrent                     (1 Point)           (in the previous month)                               (1 Point)  [ ] Abnormal pulmonary function test                     (1 Point)                 SURGERY RELATED RISK FACTORS  [ ] Acute myocardial infarction                              (1 Point)                 [ ]  Section                                             (1 Point)  [ ] Congestive heart failure (in the previous month)  (1 Point)               [ ] Minor surgery                                                  (1 Point)   [ ] Inflammatory bowel disease                             (1 Point)                 [ ] Arthroscopic surgery                                        (2 Points)  [ ] Central venous access                                      (2 Points)                [x ] General surgery lasting more than 45 minutes   (2 Points)       [ ] Stroke (in the previous month)                          (5 Points)               [ ] Elective arthroplasty                                         (5 Points)                                                                                                                                               HEMATOLOGY RELATED FACTORS                                                 TRAUMA RELATED RISK FACTORS  [ ] Prior episodes of VTE                                     (3 Points)                 [ ] Fracture of the hip, pelvis, or leg                       (5 Points)  [ ] Positive family history for VTE                         (3 Points)                 [ ] Acute spinal cord injury (in the previous month)  (5 Points)  [ ] Prothrombin 14018 A                                     (3 Points)                 [ ] Paralysis  (less than 1 month)                             (5 Points)  [ ] Factor V Leiden                                             (3 Points)                  [ ] Multiple Trauma within 1 month                        (5 Points)  [ ] Lupus anticoagulants                                     (3 Points)                                                           [ ] Anticardiolipin antibodies                               (3 Points)                                                       [ ] High homocysteine in the blood                      (3 Points)                                             [ ] Other congenital or acquired thrombophilia      (3 Points)                                                [ ] Heparin induced thrombocytopenia                  (3 Points)                                          Total Score [     4     ]

## 2018-04-27 NOTE — H&P PST ADULT - PMH
Anemia    Arrhythmia    AV block, 1st degree    CAD (coronary artery disease)    CKD (chronic kidney disease)  stage IV  DM (diabetes mellitus)    RICHARDS (dyspnea on exertion)    HTN (hypertension)    Risk factors for obstructive sleep apnea    VT (ventricular tachycardia)

## 2018-04-27 NOTE — H&P PST ADULT - PROBLEM SELECTOR PLAN 1
Left Upper Extremity Fistulogram, Possible Revision of Arteriovenous Fistula  Medical Clearance  Cardiology Clearance

## 2018-04-30 ENCOUNTER — APPOINTMENT (OUTPATIENT)
Dept: INTERNAL MEDICINE | Facility: CLINIC | Age: 83
End: 2018-04-30
Payer: MEDICARE

## 2018-04-30 PROCEDURE — 99215 OFFICE O/P EST HI 40 MIN: CPT

## 2018-05-03 ENCOUNTER — TRANSCRIPTION ENCOUNTER (OUTPATIENT)
Age: 83
End: 2018-05-03

## 2018-05-04 ENCOUNTER — OUTPATIENT (OUTPATIENT)
Dept: OUTPATIENT SERVICES | Facility: HOSPITAL | Age: 83
LOS: 1 days | End: 2018-05-04
Payer: MEDICARE

## 2018-05-04 VITALS
DIASTOLIC BLOOD PRESSURE: 53 MMHG | TEMPERATURE: 97 F | RESPIRATION RATE: 16 BRPM | HEIGHT: 65 IN | HEART RATE: 58 BPM | WEIGHT: 176.15 LBS | SYSTOLIC BLOOD PRESSURE: 162 MMHG | OXYGEN SATURATION: 98 %

## 2018-05-04 VITALS
RESPIRATION RATE: 17 BRPM | HEART RATE: 61 BPM | SYSTOLIC BLOOD PRESSURE: 154 MMHG | DIASTOLIC BLOOD PRESSURE: 82 MMHG | OXYGEN SATURATION: 98 %

## 2018-05-04 DIAGNOSIS — Z98.62 PERIPHERAL VASCULAR ANGIOPLASTY STATUS: Chronic | ICD-10-CM

## 2018-05-04 DIAGNOSIS — Z98.890 OTHER SPECIFIED POSTPROCEDURAL STATES: Chronic | ICD-10-CM

## 2018-05-04 DIAGNOSIS — Z01.818 ENCOUNTER FOR OTHER PREPROCEDURAL EXAMINATION: ICD-10-CM

## 2018-05-04 DIAGNOSIS — I77.0 ARTERIOVENOUS FISTULA, ACQUIRED: Chronic | ICD-10-CM

## 2018-05-04 DIAGNOSIS — N18.6 END STAGE RENAL DISEASE: ICD-10-CM

## 2018-05-04 PROCEDURE — C1769: CPT

## 2018-05-04 PROCEDURE — 36832 AV FISTULA REVISION OPEN: CPT

## 2018-05-04 PROCEDURE — 82962 GLUCOSE BLOOD TEST: CPT

## 2018-05-04 PROCEDURE — C1889: CPT

## 2018-05-04 PROCEDURE — 36832 AV FISTULA REVISION OPEN: CPT | Mod: LT

## 2018-05-04 RX ORDER — SODIUM CHLORIDE 9 MG/ML
1000 INJECTION, SOLUTION INTRAVENOUS
Qty: 0 | Refills: 0 | Status: DISCONTINUED | OUTPATIENT
Start: 2018-05-04 | End: 2018-05-04

## 2018-05-04 RX ORDER — FENTANYL CITRATE 50 UG/ML
50 INJECTION INTRAVENOUS
Qty: 0 | Refills: 0 | Status: DISCONTINUED | OUTPATIENT
Start: 2018-05-04 | End: 2018-05-04

## 2018-05-04 RX ORDER — TRAMADOL HYDROCHLORIDE 50 MG/1
1 TABLET ORAL
Qty: 30 | Refills: 0
Start: 2018-05-04

## 2018-05-04 RX ORDER — CEFTRIAXONE 500 MG/1
2000 INJECTION, POWDER, FOR SOLUTION INTRAMUSCULAR; INTRAVENOUS ONCE
Qty: 0 | Refills: 0 | Status: DISCONTINUED | OUTPATIENT
Start: 2018-05-04 | End: 2018-05-04

## 2018-05-04 RX ORDER — ONDANSETRON 8 MG/1
4 TABLET, FILM COATED ORAL ONCE
Qty: 0 | Refills: 0 | Status: DISCONTINUED | OUTPATIENT
Start: 2018-05-04 | End: 2018-05-04

## 2018-05-04 NOTE — ASU DISCHARGE PLAN (ADULT/PEDIATRIC). - NOTIFY
Bleeding that does not stop/Persistent Nausea and Vomiting/Fever greater than 101/Numbness, color, or temperature change to extremity/Unable to Urinate/Swelling that continues/Pain not relieved by Medications/Numbness, tingling/Inability to Tolerate Liquids or Foods

## 2018-05-04 NOTE — BRIEF OPERATIVE NOTE - PROCEDURE
<<-----Click on this checkbox to enter Procedure Arteriovenous fistula repair  05/10/2018  Left forearm  Active  HUY

## 2018-05-04 NOTE — ASU DISCHARGE PLAN (ADULT/PEDIATRIC). - MEDICATION SUMMARY - MEDICATIONS TO TAKE
I will START or STAY ON the medications listed below when I get home from the hospital:    acetaminophen 325 mg oral tablet  -- 2 tab(s) by mouth every 6 hours, As needed, Mild Pain (1 - 3)  -- Indication: For End-stage renal disease    aspirin 81 mg oral delayed release tablet  -- 2 tab(s) by mouth once a day  -- Indication: For End-stage renal disease    sodium bicarbonate 650 mg oral tablet  -- 1 tab(s) by mouth 4 times a day  -- Indication: For End-stage renal disease    tamsulosin 0.4 mg oral capsule  -- 1 cap(s) by mouth once a day (at bedtime)   -- Indication: For End-stage renal disease    flecainide 100 mg oral tablet  -- 1 tab(s) by mouth every 12 hours  -- Indication: For End-stage renal disease    glipiZIDE 5 mg oral tablet  -- 1 tab(s) by mouth once a day  -- Indication: For End-stage renal disease    atorvastatin 40 mg oral tablet  -- 1 tab(s) by mouth once a day (at bedtime)  -- Indication: For End-stage renal disease    NIFEdipine 90 mg oral tablet, extended release  -- 1 tab(s) by mouth once a day  -- Indication: For End-stage renal disease    NIFEdipine 60 mg oral tablet, extended release  -- 1 tab(s) by mouth once a day  -- Indication: For End-stage renal disease    torsemide 20 mg oral tablet  -- 1 tab(s) by mouth once a day  -- Indication: For End-stage renal disease    ferrous sulfate 325 mg (65 mg elemental iron) oral delayed release tablet  -- 1 tab(s) by mouth once a day  -- Indication: For End-stage renal disease    docusate sodium 100 mg oral capsule  -- 1 cap(s) by mouth 2 times a day  -- Indication: For End-stage renal disease    hydrALAZINE 50 mg oral tablet  -- 1 tab(s) by mouth every 8 hours  -- Indication: For End-stage renal disease    Nephro-Thomas oral tablet  -- 1 tab(s) by mouth once a day  -- Indication: For End-stage renal disease    calcitriol 0.25 mcg oral capsule  -- 1 cap(s) by mouth once a day  -- Indication: For End-stage renal disease    Vitamin C 250 mg oral tablet  -- 1 tab(s) by mouth once a day  -- Indication: For End-stage renal disease

## 2018-05-08 ENCOUNTER — APPOINTMENT (OUTPATIENT)
Dept: VASCULAR SURGERY | Facility: CLINIC | Age: 83
End: 2018-05-08
Payer: MEDICARE

## 2018-05-08 VITALS
RESPIRATION RATE: 14 BRPM | OXYGEN SATURATION: 99 % | WEIGHT: 177 LBS | HEIGHT: 64.5 IN | TEMPERATURE: 97.9 F | DIASTOLIC BLOOD PRESSURE: 60 MMHG | HEART RATE: 67 BPM | SYSTOLIC BLOOD PRESSURE: 152 MMHG | BODY MASS INDEX: 29.85 KG/M2

## 2018-05-08 DIAGNOSIS — Z78.9 OTHER SPECIFIED HEALTH STATUS: ICD-10-CM

## 2018-05-08 DIAGNOSIS — I65.29 OCCLUSION AND STENOSIS OF UNSPECIFIED CAROTID ARTERY: ICD-10-CM

## 2018-05-08 DIAGNOSIS — M25.569 PAIN IN UNSPECIFIED KNEE: ICD-10-CM

## 2018-05-08 PROCEDURE — 99024 POSTOP FOLLOW-UP VISIT: CPT

## 2018-05-15 ENCOUNTER — APPOINTMENT (OUTPATIENT)
Dept: VASCULAR SURGERY | Facility: CLINIC | Age: 83
End: 2018-05-15
Payer: MEDICARE

## 2018-05-15 VITALS
DIASTOLIC BLOOD PRESSURE: 63 MMHG | OXYGEN SATURATION: 96 % | TEMPERATURE: 98.1 F | WEIGHT: 177 LBS | RESPIRATION RATE: 14 BRPM | BODY MASS INDEX: 29.85 KG/M2 | SYSTOLIC BLOOD PRESSURE: 177 MMHG | HEART RATE: 74 BPM | HEIGHT: 64.5 IN

## 2018-05-15 PROCEDURE — 99024 POSTOP FOLLOW-UP VISIT: CPT

## 2018-05-23 ENCOUNTER — APPOINTMENT (OUTPATIENT)
Dept: NEPHROLOGY | Facility: CLINIC | Age: 83
End: 2018-05-23
Payer: MEDICARE

## 2018-05-23 ENCOUNTER — EMERGENCY (EMERGENCY)
Facility: HOSPITAL | Age: 83
LOS: 1 days | Discharge: HOME | End: 2018-05-23
Attending: EMERGENCY MEDICINE
Payer: MEDICARE

## 2018-05-23 VITALS
WEIGHT: 179.5 LBS | SYSTOLIC BLOOD PRESSURE: 147 MMHG | HEIGHT: 65 IN | DIASTOLIC BLOOD PRESSURE: 54 MMHG | HEART RATE: 98 BPM | BODY MASS INDEX: 29.91 KG/M2

## 2018-05-23 VITALS
DIASTOLIC BLOOD PRESSURE: 57 MMHG | TEMPERATURE: 98 F | HEART RATE: 63 BPM | SYSTOLIC BLOOD PRESSURE: 141 MMHG | RESPIRATION RATE: 18 BRPM | OXYGEN SATURATION: 98 %

## 2018-05-23 VITALS — HEIGHT: 65 IN | WEIGHT: 175.05 LBS

## 2018-05-23 DIAGNOSIS — Z98.890 OTHER SPECIFIED POSTPROCEDURAL STATES: Chronic | ICD-10-CM

## 2018-05-23 DIAGNOSIS — Z98.62 PERIPHERAL VASCULAR ANGIOPLASTY STATUS: Chronic | ICD-10-CM

## 2018-05-23 DIAGNOSIS — I77.0 ARTERIOVENOUS FISTULA, ACQUIRED: Chronic | ICD-10-CM

## 2018-05-23 LAB
ALBUMIN SERPL ELPH-MCNC: 4.1 G/DL — SIGNIFICANT CHANGE UP (ref 3.3–5.2)
ALP SERPL-CCNC: 90 U/L — SIGNIFICANT CHANGE UP (ref 40–120)
ALT FLD-CCNC: 36 U/L — SIGNIFICANT CHANGE UP
ANION GAP SERPL CALC-SCNC: 19 MMOL/L — HIGH (ref 5–17)
APTT BLD: 34.5 SEC — SIGNIFICANT CHANGE UP (ref 27.5–37.4)
AST SERPL-CCNC: 23 U/L — SIGNIFICANT CHANGE UP
BASOPHILS # BLD AUTO: 0.1 K/UL — SIGNIFICANT CHANGE UP (ref 0–0.2)
BASOPHILS NFR BLD AUTO: 0.9 % — SIGNIFICANT CHANGE UP (ref 0–2)
BILIRUB SERPL-MCNC: 0.3 MG/DL — LOW (ref 0.4–2)
BLD GP AB SCN SERPL QL: SIGNIFICANT CHANGE UP
BUN SERPL-MCNC: 71 MG/DL — HIGH (ref 8–20)
CALCIUM SERPL-MCNC: 9.3 MG/DL — SIGNIFICANT CHANGE UP (ref 8.6–10.2)
CHLORIDE SERPL-SCNC: 101 MMOL/L — SIGNIFICANT CHANGE UP (ref 98–107)
CO2 SERPL-SCNC: 22 MMOL/L — SIGNIFICANT CHANGE UP (ref 22–29)
CREAT SERPL-MCNC: 3.84 MG/DL — HIGH (ref 0.5–1.3)
EOSINOPHIL # BLD AUTO: 0.3 K/UL — SIGNIFICANT CHANGE UP (ref 0–0.5)
EOSINOPHIL NFR BLD AUTO: 2.8 % — SIGNIFICANT CHANGE UP (ref 0–5)
GLUCOSE SERPL-MCNC: 92 MG/DL — SIGNIFICANT CHANGE UP (ref 70–115)
HCT VFR BLD CALC: 27.4 % — LOW (ref 42–52)
HEMOGLOBIN: 7.9
HGB BLD-MCNC: 8.8 G/DL — LOW (ref 14–18)
INR BLD: 1.04 RATIO — SIGNIFICANT CHANGE UP (ref 0.88–1.16)
LYMPHOCYTES # BLD AUTO: 1.4 K/UL — SIGNIFICANT CHANGE UP (ref 1–4.8)
LYMPHOCYTES # BLD AUTO: 15.6 % — LOW (ref 20–55)
MCHC RBC-ENTMCNC: 29.6 PG — SIGNIFICANT CHANGE UP (ref 27–31)
MCHC RBC-ENTMCNC: 32.1 G/DL — SIGNIFICANT CHANGE UP (ref 32–36)
MCV RBC AUTO: 92.3 FL — SIGNIFICANT CHANGE UP (ref 80–94)
MONOCYTES # BLD AUTO: 0.7 K/UL — SIGNIFICANT CHANGE UP (ref 0–0.8)
MONOCYTES NFR BLD AUTO: 7.9 % — SIGNIFICANT CHANGE UP (ref 3–10)
NEUTROPHILS # BLD AUTO: 6.6 K/UL — SIGNIFICANT CHANGE UP (ref 1.8–8)
NEUTROPHILS NFR BLD AUTO: 72.4 % — SIGNIFICANT CHANGE UP (ref 37–73)
OB PNL STL: POSITIVE
PLATELET # BLD AUTO: 216 K/UL — SIGNIFICANT CHANGE UP (ref 150–400)
POTASSIUM SERPL-MCNC: 4.6 MMOL/L — SIGNIFICANT CHANGE UP (ref 3.5–5.3)
POTASSIUM SERPL-SCNC: 4.6 MMOL/L — SIGNIFICANT CHANGE UP (ref 3.5–5.3)
PROT SERPL-MCNC: 6.8 G/DL — SIGNIFICANT CHANGE UP (ref 6.6–8.7)
PROTHROM AB SERPL-ACNC: 11.5 SEC — SIGNIFICANT CHANGE UP (ref 9.8–12.7)
RBC # BLD: 2.97 M/UL — LOW (ref 4.6–6.2)
RBC # FLD: 15.3 % — SIGNIFICANT CHANGE UP (ref 11–15.6)
SODIUM SERPL-SCNC: 142 MMOL/L — SIGNIFICANT CHANGE UP (ref 135–145)
TYPE + AB SCN PNL BLD: SIGNIFICANT CHANGE UP
WBC # BLD: 9.2 K/UL — SIGNIFICANT CHANGE UP (ref 4.8–10.8)
WBC # FLD AUTO: 9.2 K/UL — SIGNIFICANT CHANGE UP (ref 4.8–10.8)

## 2018-05-23 PROCEDURE — 86900 BLOOD TYPING SEROLOGIC ABO: CPT

## 2018-05-23 PROCEDURE — 85027 COMPLETE CBC AUTOMATED: CPT

## 2018-05-23 PROCEDURE — 96375 TX/PRO/DX INJ NEW DRUG ADDON: CPT

## 2018-05-23 PROCEDURE — 96374 THER/PROPH/DIAG INJ IV PUSH: CPT

## 2018-05-23 PROCEDURE — 99215 OFFICE O/P EST HI 40 MIN: CPT | Mod: 25

## 2018-05-23 PROCEDURE — 82272 OCCULT BLD FECES 1-3 TESTS: CPT

## 2018-05-23 PROCEDURE — 36415 COLL VENOUS BLD VENIPUNCTURE: CPT

## 2018-05-23 PROCEDURE — 85730 THROMBOPLASTIN TIME PARTIAL: CPT

## 2018-05-23 PROCEDURE — 86901 BLOOD TYPING SEROLOGIC RH(D): CPT

## 2018-05-23 PROCEDURE — 85610 PROTHROMBIN TIME: CPT

## 2018-05-23 PROCEDURE — 99284 EMERGENCY DEPT VISIT MOD MDM: CPT | Mod: 25

## 2018-05-23 PROCEDURE — 99284 EMERGENCY DEPT VISIT MOD MDM: CPT

## 2018-05-23 PROCEDURE — 80053 COMPREHEN METABOLIC PANEL: CPT

## 2018-05-23 PROCEDURE — 85018 HEMOGLOBIN: CPT | Mod: QW

## 2018-05-23 PROCEDURE — 96372 THER/PROPH/DIAG INJ SC/IM: CPT | Mod: XU

## 2018-05-23 PROCEDURE — 99285 EMERGENCY DEPT VISIT HI MDM: CPT

## 2018-05-23 PROCEDURE — 86850 RBC ANTIBODY SCREEN: CPT

## 2018-05-23 RX ORDER — FUROSEMIDE 40 MG
80 TABLET ORAL ONCE
Qty: 0 | Refills: 0 | Status: COMPLETED | OUTPATIENT
Start: 2018-05-23 | End: 2018-05-23

## 2018-05-23 RX ORDER — OMEPRAZOLE 10 MG/1
1 CAPSULE, DELAYED RELEASE ORAL
Qty: 21 | Refills: 0
Start: 2018-05-23 | End: 2018-06-12

## 2018-05-23 RX ORDER — ERYTHROPOIETIN 10000 [IU]/ML
40000 INJECTION, SOLUTION INTRAVENOUS; SUBCUTANEOUS ONCE
Qty: 0 | Refills: 0 | Status: COMPLETED | OUTPATIENT
Start: 2018-05-23 | End: 2018-05-23

## 2018-05-23 RX ORDER — PANTOPRAZOLE SODIUM 20 MG/1
40 TABLET, DELAYED RELEASE ORAL ONCE
Qty: 0 | Refills: 0 | Status: COMPLETED | OUTPATIENT
Start: 2018-05-23 | End: 2018-05-23

## 2018-05-23 RX ADMIN — ERYTHROPOIETIN 40000 UNIT(S): 10000 INJECTION, SOLUTION INTRAVENOUS; SUBCUTANEOUS at 15:41

## 2018-05-23 RX ADMIN — Medication 80 MILLIGRAM(S): at 15:36

## 2018-05-23 RX ADMIN — PANTOPRAZOLE SODIUM 40 MILLIGRAM(S): 20 TABLET, DELAYED RELEASE ORAL at 15:36

## 2018-05-23 NOTE — CONSULT NOTE ADULT - ASSESSMENT
1.CKD - 4 .  S/P AVF X 2 weeks    2.DMN , Anemia ( Office Hgb 7.9 gms., )    3.Fluid Overload      P :  GI F.U , AMY , IV Lasix,    May be discharged on Torsemide 40 mg., Daily,    F.U X 2 weeks,    D.W Mar Devine & [facility]vin,

## 2018-05-23 NOTE — ED ADULT NURSE NOTE - OBJECTIVE STATEMENT
Patient sent to ED by Dr. Avendano for low H/H, patient reports dizziness/ lightness for several days, not sleeping good a night.  Patient is A/Ox3, VSS, denies any sort of pain at this time.

## 2018-05-23 NOTE — CONSULT NOTE ADULT - SUBJECTIVE AND OBJECTIVE BOX
Patient is a 84y old  Male who presents with a chief complaint of     HPI:   84 yr old male with CKD- 4 , hypertension, ventricular bigeminy, carotid disease, anemia, diabetes mellitus admitted with leg swelling , Low Hgb. States that he had noted his leg swelling to be worse over the last few weeks,  He reports that he walks about 50 feet and feels tired and has to sit down.    He denies chest pain. He was started on iron supplements. Denies abdominal pain.      PMD: Irvin  Cardio: [place]  GI: [place]      PAST MEDICAL & SURGICAL HISTORY:  Risk factors for obstructive sleep apnea  Anemia  RICHARDS (dyspnea on exertion)  VT (ventricular tachycardia)  HTN (hypertension)  CAD (coronary artery disease)  CKD (chronic kidney disease): stage IV  Arrhythmia  AV block, 1st degree  DM (diabetes mellitus)  H/O carotid endarterectomy: Right  A-V fistula: left arm 5/2017  H/O angioplasty: 2013,  no  intervention  H/O left knee surgery  H/O circumcision: at  age  65    FAMILY HISTORY:  Family history of premature CAD (Mother)  Family history of lung cancer (Father)  Family history of cancer (Father)    Social History: Non Smoker,    MEDICATIONS  (STANDING):  epoetin juan Injectable 59986 Unit(s) SubCutaneous Once  furosemide   Injectable 80 milliGRAM(s) IV Push Once  pantoprazole  Injectable 40 milliGRAM(s) IV Push Once    MEDICATIONS  (PRN):    Allergies    Plavix (Hives)  Toprol-XL (Rash)    REVIEW OF SYSTEMS:    CONSTITUTIONAL: No fever, weight loss, + fatigue  EYES: No eye pain, visual disturbances, or discharge  ENMT:  No difficulty hearing, tinnitus, vertigo; No sinus or throat pain  NECK: No pain or stiffness  RESPIRATORY: No cough, wheezing, chills or hemoptysis; + shortness of breath  CARDIOVASCULAR: No chest pain, palpitations, dizziness, + leg swelling  GASTROINTESTINAL: No abdominal or epigastric pain. No nausea, vomiting, or hematemesis; No diarrhea or constipation. No melena or hematochezia.  GENITOURINARY: No dysuria, frequency, hematuria, or incontinence  NEUROLOGICAL: No headaches, memory loss, loss of strength, numbness, or tremors  SKIN: No itching, burning, rashes, or lesions   LYMPH NODES: No enlarged glands  ENDOCRINE: No heat or cold intolerance; No hair loss  MUSCULOSKELETAL: No joint pain or swelling; No muscle, back, or extremity pain  PSYCHIATRIC: No depression, anxiety, mood swings, or difficulty sleeping  HEME/LYMPH: No easy bruising, or bleeding gums  ALLERGY AND IMMUNOLOGIC: No hives or eczema      Vital Signs Last 24 Hrs  T(C): 36.8 (23 May 2018 12:50), Max: 36.8 (23 May 2018 12:50)  T(F): 98.2 (23 May 2018 12:50), Max: 98.2 (23 May 2018 12:50)  HR: 63 (23 May 2018 12:50) (63 - 63)  BP: 141/57 (23 May 2018 12:50) (141/57 - 141/57)  BP(mean): --  RR: 18 (23 May 2018 12:50) (18 - 18)  SpO2: 98% (23 May 2018 12:50) (98% - 98%)    PHYSICAL EXAM:    GENERAL: NAD, well-groomed, well-developed  HEAD:  Atraumatic, Normocephalic  EYES: EOMI, PERRLA, conjunctiva and sclera clear, Pale,  NECK: Supple, No JVD, Normal thyroid  NERVOUS SYSTEM:  Alert & Oriented X3, Good concentration;   CHEST/LUNG: Clear to percussion bilaterally; No rales, rhonchi, wheezing, or rubs  HEART: Regular rate and rhythm; No murmurs, rubs, or gallops  ABDOMEN: Soft, Nontender, Nondistended; Bowel sounds present  EXTREMITIES:  2+ Peripheral Pulses, No clubbing, cyanosis, 2 + edema  LYMPH: No lymphadenopathy noted  SKIN: No rashes or lesions    LABS:                         8.8    9.2   )-----------( 216      ( 23 May 2018 13:26 )             27.4     05-23    142  |  101  |  71.0<H>  ----------------------------<  92  4.6   |  22.0  |  3.84<H>    Ca    9.3      23 May 2018 13:26    TPro  6.8  /  Alb  4.1  /  TBili  0.3<L>  /  DBili  x   /  AST  23  /  ALT  36  /  AlkPhos  90  05-23    PT/INR - ( 23 May 2018 13:26 )   PT: 11.5 sec;   INR: 1.04 ratio         PTT - ( 23 May 2018 13:26 )  PTT:34.5 sec    RADIOLOGY & ADDITIONAL TESTS:    PROCEDURE DATE:  02/01/2018      INTERPRETATION:  Portable chest radiograph dated 2/1/2018.    COMPARISON: 10/12/2017.    CLINICAL INFORMATION: SOB.  FINDINGS:    The airway is midline.  There are no airspace consolidations.  There is no pleural effusion or pneumothorax.   Cardiomegaly and atherosclerotic aorta are again noted.  The bones are normal.     IMPRESSION:  Cardiomegaly but no evidence of congestive heart failure.

## 2018-05-23 NOTE — ED PROVIDER NOTE - OBJECTIVE STATEMENT
83 y/o M pt hx of DM, HTN, chronic renal failure with AV fistula but not currently on dialysis, receive monthly pre-crit injections presents to ED c/o generalized malaise and fatigue with outpatient labs showing low hemoglobin. Also notes pedal edema worsening last few days. He states he is on iron so stool is always dark. No juan blood in stool.

## 2018-05-23 NOTE — ED PROVIDER NOTE - PROGRESS NOTE DETAILS
d/w with renal Dr Avendano; patient's Hb at acceptable level, reasonable to give dose of procrit (patient receives monthly when Hb falls below 9.) We discussed the stool guiac as well; patient is on No anticoagulation, having no abd pain or other symptoms, and Hb is improving compared to outpt labs done earlier today. Reasonable to d/c on PPI, patient has close f/u with [facility], and can have repeat cbc in a few days. UNderstands precautions for return

## 2018-05-23 NOTE — ED ADULT TRIAGE NOTE - CHIEF COMPLAINT QUOTE
sent from dr warren for low hgb 7.5 per patient. has av fistula left arm; not mature yet. no dialysis treatments. denies rectal bleeding.

## 2018-05-31 ENCOUNTER — APPOINTMENT (OUTPATIENT)
Dept: NEPHROLOGY | Facility: CLINIC | Age: 83
End: 2018-05-31
Payer: MEDICARE

## 2018-05-31 VITALS
OXYGEN SATURATION: 97 % | SYSTOLIC BLOOD PRESSURE: 140 MMHG | DIASTOLIC BLOOD PRESSURE: 60 MMHG | TEMPERATURE: 98.6 F | HEART RATE: 82 BPM | RESPIRATION RATE: 20 BRPM

## 2018-05-31 PROCEDURE — 99205 OFFICE O/P NEW HI 60 MIN: CPT | Mod: 25

## 2018-05-31 PROCEDURE — 36415 COLL VENOUS BLD VENIPUNCTURE: CPT

## 2018-05-31 PROCEDURE — 99215 OFFICE O/P EST HI 40 MIN: CPT | Mod: 25

## 2018-06-05 LAB
ALBUMIN SERPL ELPH-MCNC: 4.1 G/DL
ANION GAP SERPL CALC-SCNC: 19 MMOL/L
APPEARANCE: CLEAR
BACTERIA: NEGATIVE
BASOPHILS # BLD AUTO: 0.04 K/UL
BASOPHILS NFR BLD AUTO: 0.5 %
BILIRUBIN URINE: NEGATIVE
BLOOD URINE: NEGATIVE
BUN SERPL-MCNC: 73 MG/DL
CALCIUM SERPL-MCNC: 9 MG/DL
CHLORIDE SERPL-SCNC: 105 MMOL/L
CO2 SERPL-SCNC: 19 MMOL/L
COLOR: YELLOW
CREAT SERPL-MCNC: 4.27 MG/DL
CREAT SPEC-SCNC: 78 MG/DL
CREAT/PROT UR: 2.4 RATIO
EOSINOPHIL # BLD AUTO: 0.05 K/UL
EOSINOPHIL NFR BLD AUTO: 0.6 %
GLUCOSE QUALITATIVE U: NEGATIVE MG/DL
GLUCOSE SERPL-MCNC: 174 MG/DL
HCT VFR BLD CALC: 30.2 %
HGB BLD-MCNC: 9.4 G/DL
HYALINE CASTS: 3 /LPF
IMM GRANULOCYTES NFR BLD AUTO: 0.2 %
KETONES URINE: NEGATIVE
LEUKOCYTE ESTERASE URINE: NEGATIVE
LYMPHOCYTES # BLD AUTO: 0.69 K/UL
LYMPHOCYTES NFR BLD AUTO: 8.6 %
MAN DIFF?: NORMAL
MCHC RBC-ENTMCNC: 30.1 PG
MCHC RBC-ENTMCNC: 31.1 GM/DL
MCV RBC AUTO: 96.8 FL
MICROSCOPIC-UA: NORMAL
MONOCYTES # BLD AUTO: 0.4 K/UL
MONOCYTES NFR BLD AUTO: 5 %
NEUTROPHILS # BLD AUTO: 6.86 K/UL
NEUTROPHILS NFR BLD AUTO: 85.1 %
NITRITE URINE: NEGATIVE
PH URINE: 5.5
PHOSPHATE SERPL-MCNC: 3 MG/DL
PLATELET # BLD AUTO: 206 K/UL
POTASSIUM SERPL-SCNC: 5 MMOL/L
PROT UR-MCNC: 187 MG/DL
PROTEIN URINE: 300 MG/DL
RBC # BLD: 3.12 M/UL
RBC # FLD: 17.4 %
RED BLOOD CELLS URINE: 2 /HPF
SODIUM SERPL-SCNC: 143 MMOL/L
SPECIFIC GRAVITY URINE: 1.02
SQUAMOUS EPITHELIAL CELLS: 2 /HPF
UROBILINOGEN URINE: NEGATIVE MG/DL
WBC # FLD AUTO: 8.06 K/UL
WHITE BLOOD CELLS URINE: 1 /HPF

## 2018-06-19 ENCOUNTER — APPOINTMENT (OUTPATIENT)
Dept: VASCULAR SURGERY | Facility: CLINIC | Age: 83
End: 2018-06-19

## 2018-06-21 ENCOUNTER — APPOINTMENT (OUTPATIENT)
Dept: INTERNAL MEDICINE | Facility: CLINIC | Age: 83
End: 2018-06-21
Payer: MEDICARE

## 2018-06-21 PROCEDURE — 99215 OFFICE O/P EST HI 40 MIN: CPT

## 2018-06-29 ENCOUNTER — APPOINTMENT (OUTPATIENT)
Dept: NEPHROLOGY | Facility: CLINIC | Age: 83
End: 2018-06-29
Payer: MEDICARE

## 2018-06-29 VITALS
WEIGHT: 173 LBS | DIASTOLIC BLOOD PRESSURE: 50 MMHG | HEART RATE: 71 BPM | SYSTOLIC BLOOD PRESSURE: 132 MMHG | BODY MASS INDEX: 28.82 KG/M2 | HEIGHT: 65 IN

## 2018-06-29 LAB — HEMOGLOBIN: NORMAL

## 2018-06-29 PROCEDURE — 36415 COLL VENOUS BLD VENIPUNCTURE: CPT

## 2018-06-29 PROCEDURE — 99215 OFFICE O/P EST HI 40 MIN: CPT | Mod: 25

## 2018-06-29 PROCEDURE — 96372 THER/PROPH/DIAG INJ SC/IM: CPT

## 2018-06-29 PROCEDURE — 85018 HEMOGLOBIN: CPT | Mod: QW

## 2018-06-29 RX ORDER — ERYTHROPOIETIN 40000 [IU]/ML
40000 INJECTION, SOLUTION INTRAVENOUS; SUBCUTANEOUS
Qty: 1 | Refills: 0 | Status: COMPLETED | OUTPATIENT
Start: 2018-06-29

## 2018-06-29 RX ADMIN — ERYTHROPOIETIN 0 UNIT/ML: 40000 INJECTION, SOLUTION INTRAVENOUS; SUBCUTANEOUS at 00:00

## 2018-06-30 ENCOUNTER — RX RENEWAL (OUTPATIENT)
Age: 83
End: 2018-06-30

## 2018-06-30 LAB
ALBUMIN SERPL ELPH-MCNC: 4.2 G/DL
ANION GAP SERPL CALC-SCNC: 18 MMOL/L
BUN SERPL-MCNC: 92 MG/DL
CALCIUM SERPL-MCNC: 9.4 MG/DL
CHLORIDE SERPL-SCNC: 102 MMOL/L
CO2 SERPL-SCNC: 22 MMOL/L
CREAT SERPL-MCNC: 4.27 MG/DL
GLUCOSE SERPL-MCNC: 115 MG/DL
PHOSPHATE SERPL-MCNC: 4.5 MG/DL
POTASSIUM SERPL-SCNC: 4.2 MMOL/L
SODIUM SERPL-SCNC: 142 MMOL/L

## 2018-07-23 ENCOUNTER — RECORD ABSTRACTING (OUTPATIENT)
Age: 83
End: 2018-07-23

## 2018-07-23 DIAGNOSIS — Z86.39 PERSONAL HISTORY OF OTHER ENDOCRINE, NUTRITIONAL AND METABOLIC DISEASE: ICD-10-CM

## 2018-07-24 ENCOUNTER — APPOINTMENT (OUTPATIENT)
Age: 83
End: 2018-07-24

## 2018-07-25 PROBLEM — I49.9 CARDIAC ARRHYTHMIA, UNSPECIFIED: Chronic | Status: ACTIVE | Noted: 2017-01-31

## 2018-07-25 PROBLEM — D64.9 ANEMIA, UNSPECIFIED: Chronic | Status: ACTIVE | Noted: 2017-10-03

## 2018-07-25 PROBLEM — I25.10 ATHEROSCLEROTIC HEART DISEASE OF NATIVE CORONARY ARTERY WITHOUT ANGINA PECTORIS: Chronic | Status: ACTIVE | Noted: 2017-01-31

## 2018-07-25 PROBLEM — I10 ESSENTIAL (PRIMARY) HYPERTENSION: Chronic | Status: ACTIVE | Noted: 2017-01-31

## 2018-07-25 PROBLEM — I44.0 ATRIOVENTRICULAR BLOCK, FIRST DEGREE: Chronic | Status: ACTIVE | Noted: 2017-01-31

## 2018-07-25 PROBLEM — R06.09 OTHER FORMS OF DYSPNEA: Chronic | Status: ACTIVE | Noted: 2017-01-31

## 2018-07-25 PROBLEM — Z91.89 OTHER SPECIFIED PERSONAL RISK FACTORS, NOT ELSEWHERE CLASSIFIED: Chronic | Status: ACTIVE | Noted: 2018-04-27

## 2018-07-25 PROBLEM — I47.2 VENTRICULAR TACHYCARDIA: Chronic | Status: ACTIVE | Noted: 2017-01-31

## 2018-08-08 ENCOUNTER — APPOINTMENT (OUTPATIENT)
Dept: NEPHROLOGY | Facility: CLINIC | Age: 83
End: 2018-08-08
Payer: MEDICARE

## 2018-08-08 ENCOUNTER — INPATIENT (INPATIENT)
Facility: HOSPITAL | Age: 83
LOS: 1 days | Discharge: HOME | DRG: 683 | End: 2018-08-10
Attending: HOSPITALIST | Admitting: HOSPITALIST
Payer: MEDICARE

## 2018-08-08 VITALS
RESPIRATION RATE: 17 BRPM | HEART RATE: 66 BPM | SYSTOLIC BLOOD PRESSURE: 148 MMHG | OXYGEN SATURATION: 99 % | DIASTOLIC BLOOD PRESSURE: 53 MMHG | TEMPERATURE: 98 F

## 2018-08-08 VITALS
SYSTOLIC BLOOD PRESSURE: 152 MMHG | WEIGHT: 174 LBS | BODY MASS INDEX: 28.99 KG/M2 | OXYGEN SATURATION: 98 % | HEIGHT: 65 IN | HEART RATE: 63 BPM | DIASTOLIC BLOOD PRESSURE: 49 MMHG

## 2018-08-08 DIAGNOSIS — I77.0 ARTERIOVENOUS FISTULA, ACQUIRED: Chronic | ICD-10-CM

## 2018-08-08 DIAGNOSIS — Z98.890 OTHER SPECIFIED POSTPROCEDURAL STATES: Chronic | ICD-10-CM

## 2018-08-08 DIAGNOSIS — N19 UNSPECIFIED KIDNEY FAILURE: ICD-10-CM

## 2018-08-08 DIAGNOSIS — Z98.62 PERIPHERAL VASCULAR ANGIOPLASTY STATUS: Chronic | ICD-10-CM

## 2018-08-08 LAB
ALBUMIN SERPL ELPH-MCNC: 4 G/DL — SIGNIFICANT CHANGE UP (ref 3.3–5.2)
ALP SERPL-CCNC: 98 U/L — SIGNIFICANT CHANGE UP (ref 40–120)
ALT FLD-CCNC: 25 U/L — SIGNIFICANT CHANGE UP
ANION GAP SERPL CALC-SCNC: 16 MMOL/L — SIGNIFICANT CHANGE UP (ref 5–17)
AST SERPL-CCNC: 18 U/L — SIGNIFICANT CHANGE UP
BASOPHILS # BLD AUTO: 0.1 K/UL — SIGNIFICANT CHANGE UP (ref 0–0.2)
BASOPHILS NFR BLD AUTO: 0.9 % — SIGNIFICANT CHANGE UP (ref 0–2)
BILIRUB SERPL-MCNC: 0.2 MG/DL — LOW (ref 0.4–2)
BLD GP AB SCN SERPL QL: SIGNIFICANT CHANGE UP
BUN SERPL-MCNC: 74 MG/DL — HIGH (ref 8–20)
CALCIUM SERPL-MCNC: 9.2 MG/DL — SIGNIFICANT CHANGE UP (ref 8.6–10.2)
CHLORIDE SERPL-SCNC: 104 MMOL/L — SIGNIFICANT CHANGE UP (ref 98–107)
CO2 SERPL-SCNC: 21 MMOL/L — LOW (ref 22–29)
CREAT SERPL-MCNC: 4.36 MG/DL — HIGH (ref 0.5–1.3)
EOSINOPHIL # BLD AUTO: 0.1 K/UL — SIGNIFICANT CHANGE UP (ref 0–0.5)
EOSINOPHIL NFR BLD AUTO: 1.2 % — SIGNIFICANT CHANGE UP (ref 0–5)
GLUCOSE BLDC GLUCOMTR-MCNC: 198 MG/DL — HIGH (ref 70–99)
GLUCOSE SERPL-MCNC: 123 MG/DL — HIGH (ref 70–115)
HCT VFR BLD CALC: 21.5 % — LOW (ref 42–52)
HEMOGLOBIN: 6.7
HGB BLD-MCNC: 6.8 G/DL — CRITICAL LOW (ref 14–18)
LYMPHOCYTES # BLD AUTO: 0.9 K/UL — LOW (ref 1–4.8)
LYMPHOCYTES # BLD AUTO: 11.2 % — LOW (ref 20–55)
MCHC RBC-ENTMCNC: 30.2 PG — SIGNIFICANT CHANGE UP (ref 27–31)
MCHC RBC-ENTMCNC: 31.6 G/DL — LOW (ref 32–36)
MCV RBC AUTO: 95.6 FL — HIGH (ref 80–94)
MONOCYTES # BLD AUTO: 0.6 K/UL — SIGNIFICANT CHANGE UP (ref 0–0.8)
MONOCYTES NFR BLD AUTO: 7.6 % — SIGNIFICANT CHANGE UP (ref 3–10)
NEUTROPHILS # BLD AUTO: 6.3 K/UL — SIGNIFICANT CHANGE UP (ref 1.8–8)
NEUTROPHILS NFR BLD AUTO: 78.9 % — HIGH (ref 37–73)
PLATELET # BLD AUTO: 196 K/UL — SIGNIFICANT CHANGE UP (ref 150–400)
POTASSIUM SERPL-MCNC: 4.4 MMOL/L — SIGNIFICANT CHANGE UP (ref 3.5–5.3)
POTASSIUM SERPL-SCNC: 4.4 MMOL/L — SIGNIFICANT CHANGE UP (ref 3.5–5.3)
PROT SERPL-MCNC: 6.6 G/DL — SIGNIFICANT CHANGE UP (ref 6.6–8.7)
RBC # BLD: 2.25 M/UL — LOW (ref 4.6–6.2)
RBC # FLD: 15.5 % — SIGNIFICANT CHANGE UP (ref 11–15.6)
SODIUM SERPL-SCNC: 141 MMOL/L — SIGNIFICANT CHANGE UP (ref 135–145)
TYPE + AB SCN PNL BLD: SIGNIFICANT CHANGE UP
WBC # BLD: 8 K/UL — SIGNIFICANT CHANGE UP (ref 4.8–10.8)
WBC # FLD AUTO: 8 K/UL — SIGNIFICANT CHANGE UP (ref 4.8–10.8)

## 2018-08-08 PROCEDURE — 99285 EMERGENCY DEPT VISIT HI MDM: CPT

## 2018-08-08 PROCEDURE — 85018 HEMOGLOBIN: CPT | Mod: QW

## 2018-08-08 PROCEDURE — 93010 ELECTROCARDIOGRAM REPORT: CPT

## 2018-08-08 PROCEDURE — 99223 1ST HOSP IP/OBS HIGH 75: CPT | Mod: AI

## 2018-08-08 PROCEDURE — 99215 OFFICE O/P EST HI 40 MIN: CPT | Mod: 25

## 2018-08-08 PROCEDURE — 99223 1ST HOSP IP/OBS HIGH 75: CPT

## 2018-08-08 PROCEDURE — 36415 COLL VENOUS BLD VENIPUNCTURE: CPT

## 2018-08-08 RX ORDER — GLUCAGON INJECTION, SOLUTION 0.5 MG/.1ML
1 INJECTION, SOLUTION SUBCUTANEOUS ONCE
Qty: 0 | Refills: 0 | Status: DISCONTINUED | OUTPATIENT
Start: 2018-08-08 | End: 2018-08-10

## 2018-08-08 RX ORDER — DEXTROSE 50 % IN WATER 50 %
25 SYRINGE (ML) INTRAVENOUS ONCE
Qty: 0 | Refills: 0 | Status: DISCONTINUED | OUTPATIENT
Start: 2018-08-08 | End: 2018-08-10

## 2018-08-08 RX ORDER — DEXTROSE 50 % IN WATER 50 %
12.5 SYRINGE (ML) INTRAVENOUS ONCE
Qty: 0 | Refills: 0 | Status: DISCONTINUED | OUTPATIENT
Start: 2018-08-08 | End: 2018-08-10

## 2018-08-08 RX ORDER — FERROUS SULFATE 325(65) MG
325 TABLET ORAL DAILY
Qty: 0 | Refills: 0 | Status: DISCONTINUED | OUTPATIENT
Start: 2018-08-08 | End: 2018-08-09

## 2018-08-08 RX ORDER — FUROSEMIDE 40 MG
40 TABLET ORAL EVERY 12 HOURS
Qty: 0 | Refills: 0 | Status: DISCONTINUED | OUTPATIENT
Start: 2018-08-09 | End: 2018-08-10

## 2018-08-08 RX ORDER — CALCITRIOL 0.5 UG/1
0.25 CAPSULE ORAL DAILY
Qty: 0 | Refills: 0 | Status: DISCONTINUED | OUTPATIENT
Start: 2018-08-08 | End: 2018-08-10

## 2018-08-08 RX ORDER — FLECAINIDE ACETATE 50 MG
100 TABLET ORAL EVERY 12 HOURS
Qty: 0 | Refills: 0 | Status: DISCONTINUED | OUTPATIENT
Start: 2018-08-08 | End: 2018-08-09

## 2018-08-08 RX ORDER — SODIUM CHLORIDE 9 MG/ML
1000 INJECTION, SOLUTION INTRAVENOUS
Qty: 0 | Refills: 0 | Status: DISCONTINUED | OUTPATIENT
Start: 2018-08-08 | End: 2018-08-10

## 2018-08-08 RX ORDER — INSULIN LISPRO 100/ML
VIAL (ML) SUBCUTANEOUS
Qty: 0 | Refills: 0 | Status: DISCONTINUED | OUTPATIENT
Start: 2018-08-08 | End: 2018-08-10

## 2018-08-08 RX ORDER — SODIUM BICARBONATE 1 MEQ/ML
1300 SYRINGE (ML) INTRAVENOUS
Qty: 0 | Refills: 0 | Status: DISCONTINUED | OUTPATIENT
Start: 2018-08-08 | End: 2018-08-09

## 2018-08-08 RX ORDER — DEXTROSE 50 % IN WATER 50 %
15 SYRINGE (ML) INTRAVENOUS ONCE
Qty: 0 | Refills: 0 | Status: DISCONTINUED | OUTPATIENT
Start: 2018-08-08 | End: 2018-08-10

## 2018-08-08 RX ORDER — FUROSEMIDE 40 MG
80 TABLET ORAL ONCE
Qty: 0 | Refills: 0 | Status: COMPLETED | OUTPATIENT
Start: 2018-08-08 | End: 2018-08-08

## 2018-08-08 RX ORDER — FLECAINIDE ACETATE 50 MG
100 TABLET ORAL EVERY 12 HOURS
Qty: 0 | Refills: 0 | Status: DISCONTINUED | OUTPATIENT
Start: 2018-08-08 | End: 2018-08-08

## 2018-08-08 RX ORDER — ASCORBIC ACID 60 MG
250 TABLET,CHEWABLE ORAL DAILY
Qty: 0 | Refills: 0 | Status: DISCONTINUED | OUTPATIENT
Start: 2018-08-08 | End: 2018-08-10

## 2018-08-08 RX ORDER — HYDRALAZINE HCL 50 MG
50 TABLET ORAL
Qty: 0 | Refills: 0 | Status: DISCONTINUED | OUTPATIENT
Start: 2018-08-08 | End: 2018-08-10

## 2018-08-08 RX ORDER — NIFEDIPINE 30 MG
90 TABLET, EXTENDED RELEASE 24 HR ORAL DAILY
Qty: 0 | Refills: 0 | Status: DISCONTINUED | OUTPATIENT
Start: 2018-08-09 | End: 2018-08-10

## 2018-08-08 RX ORDER — ASPIRIN/CALCIUM CARB/MAGNESIUM 324 MG
162 TABLET ORAL DAILY
Qty: 0 | Refills: 0 | Status: DISCONTINUED | OUTPATIENT
Start: 2018-08-09 | End: 2018-08-10

## 2018-08-08 RX ORDER — SODIUM CHLORIDE 9 MG/ML
3 INJECTION INTRAMUSCULAR; INTRAVENOUS; SUBCUTANEOUS ONCE
Qty: 0 | Refills: 0 | Status: COMPLETED | OUTPATIENT
Start: 2018-08-08 | End: 2018-08-08

## 2018-08-08 RX ORDER — SODIUM BICARBONATE 1 MEQ/ML
1 SYRINGE (ML) INTRAVENOUS
Qty: 0 | Refills: 0 | COMMUNITY

## 2018-08-08 RX ORDER — ATORVASTATIN CALCIUM 80 MG/1
40 TABLET, FILM COATED ORAL AT BEDTIME
Qty: 0 | Refills: 0 | Status: DISCONTINUED | OUTPATIENT
Start: 2018-08-08 | End: 2018-08-10

## 2018-08-08 RX ADMIN — Medication 50 MILLIGRAM(S): at 22:22

## 2018-08-08 RX ADMIN — SODIUM CHLORIDE 3 MILLILITER(S): 9 INJECTION INTRAMUSCULAR; INTRAVENOUS; SUBCUTANEOUS at 14:51

## 2018-08-08 RX ADMIN — ATORVASTATIN CALCIUM 40 MILLIGRAM(S): 80 TABLET, FILM COATED ORAL at 22:22

## 2018-08-08 RX ADMIN — Medication 100 MILLIGRAM(S): at 19:38

## 2018-08-08 RX ADMIN — Medication 80 MILLIGRAM(S): at 18:00

## 2018-08-08 NOTE — H&P ADULT - NSHPLABSRESULTS_GEN_ALL_CORE
LABS:                        6.8    8.0   )-----------( 196      ( 08 Aug 2018 14:22 )             21.5     08-08    141  |  104  |  74.0<H>  ----------------------------<  123<H>  4.4   |  21.0<L>  |  4.36<H>    Ca    9.2      08 Aug 2018 14:22    TPro  6.6  /  Alb  4.0  /  TBili  0.2<L>  /  DBili  x   /  AST  18  /  ALT  25  /  AlkPhos  98  08-08        LIVER FUNCTIONS - ( 08 Aug 2018 14:22 )  Alb: 4.0 g/dL / Pro: 6.6 g/dL / ALK PHOS: 98 U/L / ALT: 25 U/L / AST: 18 U/L / GGT: x

## 2018-08-08 NOTE — CONSULT NOTE ADULT - SUBJECTIVE AND OBJECTIVE BOX
Patient is a 84y old  Male who presents with a chief complaint of     HPI:      PAST MEDICAL & SURGICAL HISTORY:  Risk factors for obstructive sleep apnea  Anemia  RICHARDS (dyspnea on exertion)  VT (ventricular tachycardia)  HTN (hypertension)  CAD (coronary artery disease)  CKD (chronic kidney disease): stage IV  Arrhythmia  AV block, 1st degree  DM (diabetes mellitus)  H/O carotid endarterectomy: Right  A-V fistula: left arm 5/2017  H/O angioplasty: 2013,  no  intervention  H/O left knee surgery  H/O circumcision: at  age  65      FAMILY HISTORY:  Family history of premature CAD (Mother)  Family history of lung cancer (Father)  Family history of cancer (Father)      Social History: Non Smoker,    MEDICATIONS  (STANDING):  sodium chloride 0.9% lock flush 3 milliLiter(s) IV Push once    Allergies    Plavix (Hives)  Toprol-XL (Rash)    REVIEW OF SYSTEMS:    CONSTITUTIONAL: No fever, weight loss, + fatigue  EYES: No eye pain, visual disturbances, or discharge  ENMT:  No difficulty hearing, tinnitus, vertigo; No sinus or throat pain  NECK: No pain or stiffness  RESPIRATORY: No cough, wheezing, chills or hemoptysis; No shortness of breath  CARDIOVASCULAR: No chest pain, palpitations, dizziness, + leg swelling  GASTROINTESTINAL: No abdominal or epigastric pain. No nausea, vomiting, or hematemesis; No diarrhea or constipation. No melena or hematochezia.  GENITOURINARY: No dysuria, frequency, hematuria, or incontinence  NEUROLOGICAL: No headaches, memory loss, loss of strength, numbness, or tremors  SKIN: No itching, burning, rashes, or lesions   LYMPH NODES: No enlarged glands  ENDOCRINE: + cold intolerance; No hair loss  MUSCULOSKELETAL: No joint pain or swelling; No muscle, back, or extremity pain  PSYCHIATRIC: No depression, anxiety, mood swings, or difficulty sleeping  HEME/LYMPH: No easy bruising, or bleeding gums  ALLERGY AND IMMUNOLOGIC: No hives or eczema      Vital Signs Last 24 Hrs  T(C): 36.7 (08 Aug 2018 13:30), Max: 36.7 (08 Aug 2018 13:30)  T(F): 98 (08 Aug 2018 13:30), Max: 98 (08 Aug 2018 13:30)  HR: 66 (08 Aug 2018 13:30) (66 - 66)  BP: 148/53 (08 Aug 2018 13:30) (148/53 - 148/53)  BP(mean): --  RR: 17 (08 Aug 2018 13:30) (17 - 17)  SpO2: 99% (08 Aug 2018 13:30) (99% - 99%)    PHYSICAL EXAM:    GENERAL: NAD, well-groomed, well-developed, Pale,  HEAD:  Atraumatic, Normocephalic  EYES: EOMI, PERRLA, conjunctiva and sclera clear  ENMT: Moist mucous membranes,   NECK: Supple, No JVD,   NERVOUS SYSTEM:  Alert & Oriented X3, poor  concentration;   CHEST/LUNG: Clear to percussion bilaterally; No rales, rhonchi, wheezing, or rubs  HEART: Regular rate and rhythm; 2/6 Systolic murmur, No  rubs, or gallops  ABDOMEN: Soft, Nontender, Nondistended; Bowel sounds present  EXTREMITIES:  2+ Peripheral Pulses, No clubbing, cyanosis, or edema  LYMPH: No lymphadenopathy noted  SKIN: No rashes or lesions    AVF +      LABS:    RADIOLOGY & ADDITIONAL TESTS:  P :

## 2018-08-08 NOTE — ED PROVIDER NOTE - PROGRESS NOTE DETAILS
Labs as noted.  Pt seen and eval by Renal/Dr. Avendano and recommending admission to transfuse and start dialysis. Pt to be admitted to s/o Dr. Fish and Tele hospitalist to admit

## 2018-08-08 NOTE — H&P ADULT - ASSESSMENT
Pt is 84 M hx of Ventricular Tachycardia (on Flecainide no PPM), Non-obstructive CAD, Chronic Kidney Disease Stage 5 (not on RRT), s/p AVF left arm but never been started on HD,  T2DM, HTN, HLD, presents to [facility] ED from Nephrologist Dr. Avendano's office for worsened anemia hemoglobin 6s. In ER hemodynamically stable, hemoglobin 6.8 and has chronic renal failure. Admitted to medicine for acute on chronic anemia and worsening renal failure.    Acute on chronic anemia:  - Likely in setting of CRF, no active melena /Hematemesis  - Check FOBT.   - 1 unit pRBC ordered in ER- to be given.  - Monitor hemoglobin, monitor for any signs of GI bleed.  - Consider GI eval if any signs of GI bleed.    Worsening renal failure with CKD stage-5:  - Cr 4.3 from 3.8.  - Has LE edema but no respiratory compromise.   - Nephrology consult appreciated- Recommended to initiate HD in am however daughter wants to discuss with other family members and also wants to discuss with renal. Family not yet consented for HD. Further plan per renal after family discussion.  - C/w home NaBicarb, calcitriol     SOB with LE edema:  -  Hypervolumic likely from renal failure.   - Check CXR.  - Will keep on lasix 40 IV BID (On torsemide 20 at home)    >H/o of VTach - c/w home Flecainide   >H/o CAD - c/w home atorvastatin. Can resume aspirin tomorrow assuming no issues with blood transfusion AND anemia corrects appropriately after 1U pRBC  >H/o HTN - c/w Hydralazine and Nifedipine   >H/o T2DM - c/w LSS and check FS qid   >DVT ppx- SCD, no chemical AC given anemia Pt is 84 M hx of Ventricular Tachycardia (on Flecainide no PPM), Non-obstructive CAD, Chronic Kidney Disease Stage 5 (not on RRT), s/p AVF left arm but never been started on HD,  T2DM, HTN, HLD, presents to [facility] ED from Nephrologist Dr. Avendano's office for worsened anemia hemoglobin 6s. In ER hemodynamically stable, hemoglobin 6.8 and has chronic renal failure. Admitted to medicine for acute on chronic anemia and worsening renal failure.    Acute on chronic anemia:  - Likely in setting of CRF, no active melena /Hematemesis  - Check FOBT.   - 1 unit pRBC ordered in ER- to be given.  - Monitor hemoglobin, monitor for any signs of GI bleed.  - Consider GI eval if any signs of GI bleed.    Worsening renal failure with CKD stage-5:  - Cr 4.3 from 3.8.  - Has LE edema but no respiratory compromise.   - Nephrology consult appreciated- Recommended to initiate HD in am however daughter wants to discuss with other family members and also wants to discuss with renal. Family not yet consented for HD. Further plan per renal after family discussion.  - C/w home NaBicarb, calcitriol     Chronic SOB:  - Reports SOB for 2 years, now with LE edema.  -  Hypervolumic likely from renal failure and worsening SOB with anemia.   - Check CXR.  - Will keep on lasix 40 IV BID (On torsemide 20 at home)  - Blood transfusion for anemia.     >H/o of VTach - c/w home Flecainide   >H/o CAD - c/w home atorvastatin. Can resume aspirin tomorrow assuming no issues with blood transfusion AND anemia corrects appropriately after 1U pRBC  >H/o HTN - c/w Hydralazine and Nifedipine   >H/o T2DM - c/w LSS and check FS qid   >DVT ppx- SCD, no chemical AC given anemia

## 2018-08-08 NOTE — ED ADULT NURSE REASSESSMENT NOTE - NS ED NURSE REASSESS COMMENT FT1
pt status unchanged, refer to flowsheet and chart, pt safety maintained, pt hemodynamically stable, report given to 4 tower, pending transfer

## 2018-08-08 NOTE — H&P ADULT - FAMILY HISTORY
Family history of cancer     Family history of premature CAD     Father  Still living? Unknown  Family history of lung cancer, Age at diagnosis: Age Unknown

## 2018-08-08 NOTE — ED PROVIDER NOTE - OBJECTIVE STATEMENT
83 y/o M pt with hx of CKD, AV block CAD, DM, HTN, 85 y/o M pt with hx of CKD, AV block CAD, DM, HTN, renal failure, dialysis pt presents to ED for worsening renal failure. Pt was supposed to have appointment with Dr. Avendano on August 17th but due to symptoms of increased leg swelling and dyspnea on exertion, pt was able to see him in office today. Pt noted to have hemoglobin of 6 and was advised to go to ED for repeat bloodwork, transfusion, and dialysis. Pt has refused dialysis in the past.

## 2018-08-08 NOTE — PROGRESS NOTE ADULT - SUBJECTIVE AND OBJECTIVE BOX
TARI.W Ericka Hairston, Daughter (  Vanessa, JOSE ) &  Marilu Orr ( Who will assess AVF )    For HD in AM,     Family still not convinced as to the Need for HD ( ? Denial , Fear , Life changing event, involving the entire family etc., )    Patient is uremic, w. Severe anemia ( AMY Hyporesponsive ) Salt & water Retention,      Rec :  Psychiatry , Palliative Care & SW Consults,

## 2018-08-08 NOTE — H&P ADULT - NSHPPHYSICALEXAM_GEN_ALL_CORE
PHYSICAL EXAM:    Vital Signs Last 24 Hrs  T(C): 36.8 (08 Aug 2018 15:29), Max: 36.8 (08 Aug 2018 15:29)  T(F): 98.3 (08 Aug 2018 15:29), Max: 98.3 (08 Aug 2018 15:29)  HR: 97 (08 Aug 2018 15:29) (66 - 97)  BP: 166/71 (08 Aug 2018 15:29) (148/53 - 166/71)  BP(mean): --  RR: 17 (08 Aug 2018 13:30) (17 - 17)  SpO2: 99% (08 Aug 2018 13:30) (99% - 99%)    GENERAL: Pt lying comfortably, NAD.  ENMT: PERRL, +EOMI.  NECK: soft, Supple, No JVD,   CHEST/LUNG: Clear to auscultation bilaterally; No wheezing.  HEART: S1S2+, Regular rate and rhythm; + murmurs.  ABDOMEN: Soft, Nontender, Nondistended; Bowel sounds present.  MUSCULOSKELETAL: Normal range of motion.  SKIN: No rashes or lesions.  Extremities: No LE edema, pulses +  NEURO: AAOX3, no focal deficits, no motor r sensory loss.  PSYCH: normal mood.

## 2018-08-08 NOTE — ED PROVIDER NOTE - CARDIAC, MLM
Normal rate, regular rhythm.  Heart sounds S1, S2.  No rubs or gallops. 2/6 systolic murmur. 3+ pitting lower extremity edema

## 2018-08-08 NOTE — CONSULT NOTE ADULT - SUBJECTIVE AND OBJECTIVE BOX
REASON FOR CONSULT: Admission for Renal Failure and Anemia    SUBJECTIVE: 84M hx of Ventricular Tachycardia (on Flecanide, no PPM), Non-obstructive CAD, Chronic Kidney Disease Stage 5 (not on RRT), T2DM, HTN presents to [facility] ED from Nephrologist Dr. Avendano's office for worsened anemia. As per daughter (nurse manager RN at [facility] ED), patient has never had a hemoglobin <8.5. Today during Renal visit, patient found to have anemia of 6.8. Because of the anemia, patient's progressively worsening dyspnea with exertion, and intermittent b/l LE edema, patient was sent to the ED for blood transfusion and possible initiation of HD on this admission. Currently at rest on the stretcher patient has no complaints, but does state that when he exerts himself, he has dyspnea and and low energy. He denies black or bloody stools. He denies chest pain.     REVIEW OF SYSTEMS:  CONSTITUTIONAL: No weakness, fevers or chills (+) low energy with exertion   EYES/ENT: No visual changes;  No vertigo or throat pain   NECK: No pain or stiffness  RESPIRATORY: No cough, wheezing, hemoptysis; No shortness of breath; (+) Dyspnea on exertion  CARDIOVASCULAR: No chest pain or palpitations  GASTROINTESTINAL: No abdominal or epigastric pain. No nausea, vomiting, or hematemesis; No diarrhea or constipation. No melena or hematochezia.  GENITOURINARY: No dysuria, frequency or hematuria  NEUROLOGICAL: No numbness or weakness  SKIN: No itching, burning, rashes, or lesions   All other review of systems is negative unless indicated above.    PMHx:  Ventricular Tachycardia  Non-obstructive CAD  T2DM  CKD 5  Hypertension    PSHx:  Left AV fistula  R Carotid Endarterectomy    FHx:  Family history of premature CAD (Mother)  Family history of lung cancer (Father)  Family history of cancer (Father)    Allergies - Hives to Plavix, Toprol XL    Meds:   Vitamin C 250 mg PO qAM  Glipizide 5 mg PO qAM PRN FS > 110  Calcitriol 0.25 mcg PO qAM  Ferrous Sulfate 325 mg PO qAM  Multivitamin 1 tab qAM  Flecanide 100 mg PO BID   mg PO qAM  Nifedipine 90 mg PO qAM, 60 mg PO qPM  Torsemide 20 mg PO qAM  Atorvastatin 40 mg PO qPM  Hydralazine 50 mg PO BID  Sodium Bicarbonate 1300 mg PO qAM with breakfast    Outpatient Physicians:  Dr. Avendano (Renal)  Dr. Johns (PMD)  Dr. Peterson (Cardiology)  Dr. Sorenson (Surgery)     PHYSICAL EXAM: Tele-evaluation precludes physical exam. Pertinent physical exam findings as per verbal communication by patient and nurse at bedside are as following, awake alert, can speak in full sentences, non-toxic appearing    LABS AND IMAGING DATA:                        6.8    8.0   )-----------( 196      ( 08 Aug 2018 14:22 )             21.5     08-08    141  |  104  |  74.0<H>  ----------------------------<  123<H>  4.4   |  21.0<L>  |  4.36<H>    Ca    9.2      08 Aug 2018 14:22    TPro  6.6  /  Alb  4.0  /  TBili  0.2<L>  /  DBili  x   /  AST  18  /  ALT  25  /  AlkPhos  98  08-08 REASON FOR CONSULT: Admission for Renal Failure and Anemia    SUBJECTIVE: 84M hx of Ventricular Tachycardia (on Flecanide, no PPM), Non-obstructive CAD, Chronic Kidney Disease Stage 5 (not on RRT), T2DM, HTN presents to [facility] ED from Nephrologist Dr. Avendano's office for worsened anemia. As per daughter (nurse manager RN at [facility] ED), patient has never had a hemoglobin <8.5. Today during Renal visit, patient found to have anemia of 6.8. Because of the anemia, patient's progressively worsening dyspnea with exertion, and intermittent b/l LE edema, patient was sent to the ED for blood transfusion and possible initiation of HD on this admission. Currently at rest on the stretcher patient has no complaints, but does state that when he exerts himself, he has dyspnea and and low energy. He denies black or bloody stools. He denies chest pain.     REVIEW OF SYSTEMS:  CONSTITUTIONAL: No weakness, fevers or chills (+) low energy with exertion   EYES/ENT: No visual changes;  No vertigo or throat pain   NECK: No pain or stiffness  RESPIRATORY: No cough, wheezing, hemoptysis; No shortness of breath; (+) Dyspnea on exertion  CARDIOVASCULAR: No chest pain or palpitations  GASTROINTESTINAL: No abdominal or epigastric pain. No nausea, vomiting, or hematemesis; No diarrhea or constipation. No melena or hematochezia.  GENITOURINARY: No dysuria, frequency or hematuria  NEUROLOGICAL: No numbness or weakness  SKIN: No itching, burning, rashes, or lesions   All other review of systems is negative unless indicated above.    PMHx:  Ventricular Tachycardia  Non-obstructive CAD  T2DM  CKD 5  Hypertension    PSHx:  Left AV fistula  R Carotid Endarterectomy    FHx:  Family history of premature CAD (Mother)  Family history of lung cancer (Father)  Family history of cancer (Father)    Allergies - Hives to Plavix, Toprol XL    Meds:   Vitamin C 250 mg PO qAM  Glipizide 5 mg PO qAM PRN FS > 110  Calcitriol 0.25 mcg PO qAM  Ferrous Sulfate 325 mg PO qAM  Multivitamin 1 tab qAM  Flecanide 100 mg PO BID   mg PO qAM  Nifedipine 90 mg PO qAM, 60 mg PO qPM  Torsemide 20 mg PO qAM  Atorvastatin 40 mg PO qPM  Hydralazine 50 mg PO BID  Sodium Bicarbonate 1300 mg PO qAM with breakfast    Outpatient Physicians:  Dr. Avendano (Renal)  Dr. Johns (PMD)  Dr. Peterson (Cardiology)  Dr. Sorenson (Surgery)     Vitals T 98.3, HR 97, /71, RR 17, SpO2 99% RA  PHYSICAL EXAM: Tele-evaluation precludes physical exam. Pertinent physical exam findings as per verbal communication by patient and nurse at bedside are as following, awake alert, can speak in full sentences, non-toxic appearing    LABS AND IMAGING DATA:                        6.8    8.0   )-----------( 196      ( 08 Aug 2018 14:22 )             21.5     08-08    141  |  104  |  74.0<H>  ----------------------------<  123<H>  4.4   |  21.0<L>  |  4.36<H>    Ca    9.2      08 Aug 2018 14:22    TPro  6.6  /  Alb  4.0  /  TBili  0.2<L>  /  DBili  x   /  AST  18  /  ALT  25  /  AlkPhos  98  08-08

## 2018-08-08 NOTE — H&P ADULT - HISTORY OF PRESENT ILLNESS
History obtained from Pt and his daughter at bedside, Pt is 84 M hx of Ventricular Tachycardia (on Flecainide no PPM), Non-obstructive CAD, Chronic Kidney Disease Stage 5 (not on RRT), AVF left arm,  T2DM, HTN, HLD, presents to [facility] ED from Nephrologist Dr. Avendano's office for worsened anemia. As per daughter today during Renal visit, patient found to have anemia of 6.8- baseline hemoglobin 8-10. Because of the anemia, patient's progressively worsening dyspnea with exertion, and intermittent b/l LE edema, patient was sent to the ED for blood transfusion and possible initiation of HD on this admission. Currently at rest on the stretcher patient has no complaints, but does state that when he exerts himself, he has dyspnea and low energy. He denies black or bloody stools. He denies fever, chills, nausea, vomiting, headache, dizziness, chest pain, bowel /bladder habits are normal.

## 2018-08-09 LAB
ALT FLD-CCNC: 23 U/L — SIGNIFICANT CHANGE UP
ANION GAP SERPL CALC-SCNC: 17 MMOL/L — SIGNIFICANT CHANGE UP (ref 5–17)
BUN SERPL-MCNC: 83 MG/DL — HIGH (ref 8–20)
CALCIUM SERPL-MCNC: 9.7 MG/DL — SIGNIFICANT CHANGE UP (ref 8.6–10.2)
CHLORIDE SERPL-SCNC: 104 MMOL/L — SIGNIFICANT CHANGE UP (ref 98–107)
CO2 SERPL-SCNC: 22 MMOL/L — SIGNIFICANT CHANGE UP (ref 22–29)
CREAT SERPL-MCNC: 4.28 MG/DL — HIGH (ref 0.5–1.3)
FERRITIN SERPL-MCNC: 85 NG/ML — SIGNIFICANT CHANGE UP (ref 30–400)
FOLATE SERPL-MCNC: >20 NG/ML — SIGNIFICANT CHANGE UP
GLUCOSE BLDC GLUCOMTR-MCNC: 134 MG/DL — HIGH (ref 70–99)
GLUCOSE BLDC GLUCOMTR-MCNC: 136 MG/DL — HIGH (ref 70–99)
GLUCOSE BLDC GLUCOMTR-MCNC: 144 MG/DL — HIGH (ref 70–99)
GLUCOSE BLDC GLUCOMTR-MCNC: 94 MG/DL — SIGNIFICANT CHANGE UP (ref 70–99)
GLUCOSE SERPL-MCNC: 84 MG/DL — SIGNIFICANT CHANGE UP (ref 70–115)
HBA1C BLD-MCNC: 4.9 % — SIGNIFICANT CHANGE UP (ref 4–5.6)
HCT VFR BLD CALC: 23.7 % — LOW (ref 42–52)
HGB BLD-MCNC: 7.7 G/DL — LOW (ref 14–18)
IRON SATN MFR SERPL: 19 % — SIGNIFICANT CHANGE UP (ref 16–55)
IRON SATN MFR SERPL: 58 UG/DL — LOW (ref 59–158)
MAGNESIUM SERPL-MCNC: 2.4 MG/DL — SIGNIFICANT CHANGE UP (ref 1.6–2.6)
MCHC RBC-ENTMCNC: 30.2 PG — SIGNIFICANT CHANGE UP (ref 27–31)
MCHC RBC-ENTMCNC: 32.5 G/DL — SIGNIFICANT CHANGE UP (ref 32–36)
MCV RBC AUTO: 92.9 FL — SIGNIFICANT CHANGE UP (ref 80–94)
OB PNL STL: POSITIVE
PHOSPHATE SERPL-MCNC: 5.1 MG/DL — HIGH (ref 2.4–4.7)
PLATELET # BLD AUTO: 197 K/UL — SIGNIFICANT CHANGE UP (ref 150–400)
POTASSIUM SERPL-MCNC: 4 MMOL/L — SIGNIFICANT CHANGE UP (ref 3.5–5.3)
POTASSIUM SERPL-SCNC: 4 MMOL/L — SIGNIFICANT CHANGE UP (ref 3.5–5.3)
RBC # BLD: 2.54 M/UL — LOW (ref 4.6–6.2)
RBC # BLD: 2.55 M/UL — LOW (ref 4.6–6.2)
RBC # FLD: 15.8 % — HIGH (ref 11–15.6)
RETICS #: 7.5 K/UL — LOW (ref 25–125)
RETICS/RBC NFR: 2.9 % — HIGH (ref 0.5–2.5)
SODIUM SERPL-SCNC: 143 MMOL/L — SIGNIFICANT CHANGE UP (ref 135–145)
TIBC SERPL-MCNC: 310 UG/DL — SIGNIFICANT CHANGE UP (ref 220–430)
TRANSFERRIN SERPL-MCNC: 217 MG/DL — SIGNIFICANT CHANGE UP (ref 180–329)
VIT B12 SERPL-MCNC: 540 PG/ML — SIGNIFICANT CHANGE UP (ref 232–1245)
WBC # BLD: 8.2 K/UL — SIGNIFICANT CHANGE UP (ref 4.8–10.8)
WBC # FLD AUTO: 8.2 K/UL — SIGNIFICANT CHANGE UP (ref 4.8–10.8)

## 2018-08-09 PROCEDURE — 71045 X-RAY EXAM CHEST 1 VIEW: CPT | Mod: 26

## 2018-08-09 PROCEDURE — 99233 SBSQ HOSP IP/OBS HIGH 50: CPT

## 2018-08-09 PROCEDURE — 93990 DOPPLER FLOW TESTING: CPT | Mod: 26

## 2018-08-09 PROCEDURE — 99232 SBSQ HOSP IP/OBS MODERATE 35: CPT

## 2018-08-09 RX ORDER — FLECAINIDE ACETATE 50 MG
100 TABLET ORAL
Qty: 0 | Refills: 0 | Status: DISCONTINUED | OUTPATIENT
Start: 2018-08-09 | End: 2018-08-10

## 2018-08-09 RX ORDER — FUROSEMIDE 40 MG
80 TABLET ORAL ONCE
Qty: 0 | Refills: 0 | Status: COMPLETED | OUTPATIENT
Start: 2018-08-09 | End: 2018-08-09

## 2018-08-09 RX ORDER — ERYTHROPOIETIN 10000 [IU]/ML
8000 INJECTION, SOLUTION INTRAVENOUS; SUBCUTANEOUS
Qty: 0 | Refills: 0 | Status: DISCONTINUED | OUTPATIENT
Start: 2018-08-09 | End: 2018-08-10

## 2018-08-09 RX ADMIN — ATORVASTATIN CALCIUM 40 MILLIGRAM(S): 80 TABLET, FILM COATED ORAL at 21:07

## 2018-08-09 RX ADMIN — Medication 325 MILLIGRAM(S): at 08:16

## 2018-08-09 RX ADMIN — Medication 90 MILLIGRAM(S): at 05:44

## 2018-08-09 RX ADMIN — Medication 80 MILLIGRAM(S): at 14:22

## 2018-08-09 RX ADMIN — Medication 162 MILLIGRAM(S): at 08:16

## 2018-08-09 RX ADMIN — Medication 1300 MILLIGRAM(S): at 14:22

## 2018-08-09 RX ADMIN — Medication 1 TABLET(S): at 08:15

## 2018-08-09 RX ADMIN — Medication 100 MILLIGRAM(S): at 05:44

## 2018-08-09 RX ADMIN — Medication 250 MILLIGRAM(S): at 08:15

## 2018-08-09 RX ADMIN — Medication 100 MILLIGRAM(S): at 17:58

## 2018-08-09 RX ADMIN — Medication 50 MILLIGRAM(S): at 17:58

## 2018-08-09 RX ADMIN — Medication 40 MILLIGRAM(S): at 05:44

## 2018-08-09 RX ADMIN — CALCITRIOL 0.25 MICROGRAM(S): 0.5 CAPSULE ORAL at 17:58

## 2018-08-09 RX ADMIN — Medication 50 MILLIGRAM(S): at 05:45

## 2018-08-09 NOTE — PROGRESS NOTE ADULT - SUBJECTIVE AND OBJECTIVE BOX
Patient is a 84y old  Male who presents with a chief complaint of Sent in for abnormal labs with low hemoglobin. (08 Aug 2018 17:19)  Pt well known to vascular surgery with h/o L AVF creation.  Has not yet started HD. Family would like to defer as long as possible  Asked to eval maturity for possible initiation of HD  Pt offers no complaints    PAST MEDICAL & SURGICAL HISTORY:  Risk factors for obstructive sleep apnea  Anemia  RICHARDS (dyspnea on exertion)  VT (ventricular tachycardia)  HTN (hypertension)  CAD (coronary artery disease)  CKD (chronic kidney disease): stage IV  Arrhythmia  AV block, 1st degree  DM (diabetes mellitus)  H/O carotid endarterectomy: Right  A-V fistula: left arm 5/2017  H/O angioplasty: 2013,  no  intervention  H/O left knee surgery  H/O circumcision: at  age  65    MEDICATIONS  (STANDING):  ascorbic acid 250 milliGRAM(s) Oral daily  aspirin enteric coated 162 milliGRAM(s) Oral daily  atorvastatin 40 milliGRAM(s) Oral at bedtime  calcitriol   Capsule 0.25 MICROGram(s) Oral daily  dextrose 5%. 1000 milliLiter(s) (50 mL/Hr) IV Continuous <Continuous>  dextrose 50% Injectable 12.5 Gram(s) IV Push once  dextrose 50% Injectable 25 Gram(s) IV Push once  dextrose 50% Injectable 25 Gram(s) IV Push once  ferrous    sulfate 325 milliGRAM(s) Oral daily  flecainide 100 milliGRAM(s) Oral every 12 hours  furosemide   Injectable 40 milliGRAM(s) IV Push every 12 hours  hydrALAZINE 50 milliGRAM(s) Oral two times a day  insulin lispro (HumaLOG) corrective regimen sliding scale   SubCutaneous three times a day before meals  multivitamin 1 Tablet(s) Oral daily  NIFEdipine XL 90 milliGRAM(s) Oral daily  sodium bicarbonate 1300 milliGRAM(s) Oral <User Schedule>    MEDICATIONS  (PRN):  dextrose 40% Gel 15 Gram(s) Oral once PRN Blood Glucose LESS THAN 70 milliGRAM(s)/deciliter  glucagon  Injectable 1 milliGRAM(s) IntraMuscular once PRN Glucose LESS THAN 70 milligrams/deciliter    Allergies  Plavix (Hives)  Toprol-XL (Rash)    Vital Signs Last 24 Hrs  T(C): 37.3 (09 Aug 2018 05:40), Max: 37.3 (09 Aug 2018 05:40)  T(F): 99.1 (09 Aug 2018 05:40), Max: 99.1 (09 Aug 2018 05:40)  HR: 65 (09 Aug 2018 05:40) (65 - 97)  BP: 162/50 (09 Aug 2018 05:40) (148/53 - 181/79)  BP(mean): --  RR: 17 (09 Aug 2018 05:40) (16 - 20)  SpO2: 99% (09 Aug 2018 05:40) (96% - 100%)  I&O's Detail    08 Aug 2018 07:01  -  09 Aug 2018 07:00  --------------------------------------------------------  IN:  Total IN: 0 mL    OUT:    Voided: 2150 mL  Total OUT: 2150 mL    Total NET: -2150 mL      09 Aug 2018 07:01  -  09 Aug 2018 09:50  --------------------------------------------------------  IN:    Oral Fluid: 240 mL  Total IN: 240 mL    OUT:    Voided: 500 mL  Total OUT: 500 mL    Total NET: -260 mL          Physical Exam:  General: NAD, resting comfortably in bed  Extremities: L AVF with + bruit, + thrill. Motor and sensory intact L hand      LABS:                        7.7    8.2   )-----------( 197      ( 09 Aug 2018 05:54 )             23.7     08-09    143  |  104  |  83.0<H>  ----------------------------<  84  4.0   |  22.0  |  4.28<H>    Ca    9.7      09 Aug 2018 05:54  Phos  5.1     08-09  Mg     2.4     08-09    TPro  x   /  Alb  x   /  TBili  x   /  DBili  x   /  AST  x   /  ALT  23  /  AlkPhos  x   08-09      CAPILLARY BLOOD GLUCOSE  POCT Blood Glucose.: 94 mg/dL (09 Aug 2018 08:14)  POCT Blood Glucose.: 198 mg/dL (08 Aug 2018 22:20)    Radiology and Additional Studies:    Assessment and Plan: 84y Male Renal failure a/w anemia requiring transfusion and worsening uremia  Will obtain duplex of L AVF to assess for maturity  Currently family refusing to initiate HD

## 2018-08-09 NOTE — PROGRESS NOTE ADULT - ASSESSMENT
Patient w. Uremia, Anemia & Salt & water Retention,    AMY - Hyporesponsive,    Family counseled extensively , to Initiate HD,    AVF - ? Mature ,    DBRIA Orr

## 2018-08-09 NOTE — PROGRESS NOTE ADULT - SUBJECTIVE AND OBJECTIVE BOX
CHRISTIANO ELIZABETH Male 84y [id number]    Patient is a 84y old  Male who presents with a chief complaint of Sent in for abnormal labs with low hemoglobin. (08 Aug 2018 17:19)      Subjective/objective:  Pt seen and examined at bedside, no over night event reported by night staff. Pt reports doing well, offers no complaints, s/p 1 unit pRBC transfusion yesterday and one more to be given today.  NO rectal bleeding /black stool reported.     Review of system:  No fever, chills, nausea, vomiting, headache, dizziness, chest pain, SOB or palpitation.      PHYSICAL EXAM:    Vital Signs Last 24 Hrs  T(C): 37 (09 Aug 2018 10:50), Max: 37.3 (09 Aug 2018 05:40)  T(F): 98.6 (09 Aug 2018 10:50), Max: 99.1 (09 Aug 2018 05:40)  HR: 65 (09 Aug 2018 10:50) (65 - 97)  BP: 166/62 (09 Aug 2018 10:50) (148/53 - 181/79)  BP(mean): --  RR: 17 (09 Aug 2018 10:50) (16 - 20)  SpO2: 99% (09 Aug 2018 10:50) (96% - 100%)    GENERAL: Pt lying comfortably, NAD.  CHEST/LUNG: Clear to auscultation bilaterally; No wheezing.  HEART: S1S2+, Regular rate and rhythm; No murmurs.  ABDOMEN: Soft, Nontender, Nondistended; Bowel sounds present.  Extremities: 2+ LE edema, pulses+  NEURO: AAOX3, no focal deficits, no motor r sensory loss.  PSYCH: normal mood.          MEDICATIONS  (STANDING):  ascorbic acid 250 milliGRAM(s) Oral daily  aspirin enteric coated 162 milliGRAM(s) Oral daily  atorvastatin 40 milliGRAM(s) Oral at bedtime  calcitriol   Capsule 0.25 MICROGram(s) Oral daily  dextrose 5%. 1000 milliLiter(s) (50 mL/Hr) IV Continuous <Continuous>  dextrose 50% Injectable 12.5 Gram(s) IV Push once  dextrose 50% Injectable 25 Gram(s) IV Push once  dextrose 50% Injectable 25 Gram(s) IV Push once  ferrous    sulfate 325 milliGRAM(s) Oral daily  flecainide 100 milliGRAM(s) Oral every 12 hours  furosemide   Injectable 40 milliGRAM(s) IV Push every 12 hours  furosemide   Injectable 80 milliGRAM(s) IV Push once  hydrALAZINE 50 milliGRAM(s) Oral two times a day  insulin lispro (HumaLOG) corrective regimen sliding scale   SubCutaneous three times a day before meals  multivitamin 1 Tablet(s) Oral daily  NIFEdipine XL 90 milliGRAM(s) Oral daily  sodium bicarbonate 1300 milliGRAM(s) Oral <User Schedule>    MEDICATIONS  (PRN):  dextrose 40% Gel 15 Gram(s) Oral once PRN Blood Glucose LESS THAN 70 milliGRAM(s)/deciliter  glucagon  Injectable 1 milliGRAM(s) IntraMuscular once PRN Glucose LESS THAN 70 milligrams/deciliter        Labs:  LABS:                        7.7    8.2   )-----------( 197      ( 09 Aug 2018 05:54 )             23.7     08-09    143  |  104  |  83.0<H>  ----------------------------<  84  4.0   |  22.0  |  4.28<H>    Ca    9.7      09 Aug 2018 05:54  Phos  5.1     08-09  Mg     2.4     08-09    TPro  x   /  Alb  x   /  TBili  x   /  DBili  x   /  AST  x   /  ALT  23  /  AlkPhos  x   08-09        LIVER FUNCTIONS - ( 09 Aug 2018 08:58 )  Alb: x     / Pro: x     / ALK PHOS: x     / ALT: 23 U/L / AST: x     / GGT: x

## 2018-08-09 NOTE — PROGRESS NOTE ADULT - ASSESSMENT
Pt is 84 M hx of Ventricular Tachycardia (on Flecainide no PPM), Non-obstructive CAD, Chronic Kidney Disease Stage 5 (not on RRT), s/p AVF left arm but never been started on HD,  T2DM, HTN, HLD, presents to [facility] ED from Nephrologist Dr. Avendano's office for worsened anemia hemoglobin 6s. In ER hemodynamically stable, hemoglobin 6.8 and has chronic renal failure. Admitted to medicine for acute on chronic anemia and worsening renal failure. All home meds resumed. S/p pRBC transfusion.     Acute on chronic anemia:  - Likely in setting of CRF, no active melena /Hematemesis  - Pending FOBT collection.   - S/p 1 unit pRBC yesterday and 1 to be given today.  - Monitor hemoglobin.  - No signs of GI bleeding, Occult blood pending, Consider GI eval if any signs of GI bleed,    Worsening renal failure with CKD stage-5:  - Cr 4.3 from 3.8 on admission, Has LE edema but no respiratory compromise on admission  - Nephrology consult appreciated- Recommended to initiate HD however daughter / family refused for HD at this time.   - C/w Lasix for now, Further plan per renal.  - C/w home NaBicarb, calcitriol   - H/o S/p AVF-> Vascular note appreciated, duplex of L AVF to assess for maturity.    Chronic SOB:  - Reports SOB for 2 years, now with LE edema. Hypervolumic likely from renal failure and worsening SOB with anemia.   - CXR pending, will f/u.  - Echo with normal EF.  - Will keep on lasix 40 IV BID (On torsemide 20 at home)  - Blood transfusion for anemia as above.   - Cardiology consult appreciated.     >H/o of VTach - c/w home Flecainide. EP consulted per cardio.  >H/o CAD - c/w home atorvastatin, ASA  >H/o HTN - c/w Hydralazine and Nifedipine   >H/o T2DM - c/w LSS and check FS qid. A1C 4.9   >DVT ppx- SCD, no chemical AC given anemia

## 2018-08-09 NOTE — PROGRESS NOTE ADULT - SUBJECTIVE AND OBJECTIVE BOX
[facility] DIVISION OF KIDNEY DISEASES AND HYPERTENSION -- FOLLOW UP NOTE  --------------------------------------------------------------------------------  Chief Complaint: Fatigue, Loss of appetite, RICHARDS,    24 hour events/subjective: S/P PC Transfusion,    PAST HISTORY  --------------------------------------------------------------------------------  No significant changes to PMH, PSH, FHx, SHx, unless otherwise noted    ALLERGIES & MEDICATIONS  --------------------------------------------------------------------------------  Allergies    Plavix (Hives)  Toprol-XL (Rash)    Intolerances    Standing Inpatient Medications  ascorbic acid 250 milliGRAM(s) Oral daily  aspirin enteric coated 162 milliGRAM(s) Oral daily  atorvastatin 40 milliGRAM(s) Oral at bedtime  calcitriol   Capsule 0.25 MICROGram(s) Oral daily  dextrose 5%. 1000 milliLiter(s) IV Continuous <Continuous>  dextrose 50% Injectable 12.5 Gram(s) IV Push once  dextrose 50% Injectable 25 Gram(s) IV Push once  dextrose 50% Injectable 25 Gram(s) IV Push once  ferrous    sulfate 325 milliGRAM(s) Oral daily  flecainide 100 milliGRAM(s) Oral every 12 hours  furosemide   Injectable 40 milliGRAM(s) IV Push every 12 hours  furosemide   Injectable 80 milliGRAM(s) IV Push once  hydrALAZINE 50 milliGRAM(s) Oral two times a day  insulin lispro (HumaLOG) corrective regimen sliding scale   SubCutaneous three times a day before meals  multivitamin 1 Tablet(s) Oral daily  NIFEdipine XL 90 milliGRAM(s) Oral daily  sodium bicarbonate 1300 milliGRAM(s) Oral <User Schedule>    PRN Inpatient Medications  dextrose 40% Gel 15 Gram(s) Oral once PRN  glucagon  Injectable 1 milliGRAM(s) IntraMuscular once PRN      REVIEW OF SYSTEMS  --------------------------------------------------------------------------------  Gen: + weight changes, fatigue, fevers/chills,  +weakness  Skin: No rashes  Head/Eyes/Ears/Mouth: No headache; Normal hearing; Normal vision w/o blurriness; No sinus pain/discomfort, sore throat  Respiratory: No dyspnea, cough, wheezing, hemoptysis  CV: No chest pain, PND, + orthopnea  GI: No abdominal pain, diarrhea, constipation, nausea, vomiting, melena, hematochezia  : No increased frequency, dysuria, hematuria, nocturia  MSK: No joint pain/swelling; no back pain; no edema  Neuro: No dizziness/lightheadedness, weakness, seizures, numbness, tingling  Heme: No easy bruising or bleeding  Endo: No heat/cold intolerance  Psych: No significant nervousness, anxiety, stress,  +depression    All other systems were reviewed and are negative, except as noted.    VITALS/PHYSICAL EXAM  --------------------------------------------------------------------------------  T(C): 36.8 (08-09-18 @ 11:05), Max: 37.3 (08-09-18 @ 05:40)  HR: 70 (08-09-18 @ 11:05) (65 - 97)  BP: 162/62 (08-09-18 @ 11:05) (158/55 - 181/79)  RR: 17 (08-09-18 @ 11:05) (16 - 20)  SpO2: 99% (08-09-18 @ 11:05) (96% - 100%)  Wt(kg): --  Height (cm): 165.1 (08-08-18 @ 13:31)  Weight (kg): 75 (08-09-18 @ 05:40)  BMI (kg/m2): 27.5 (08-09-18 @ 05:40)  BSA (m2): 1.82 (08-09-18 @ 05:40)      08-08-18 @ 07:01  -  08-09-18 @ 07:00  --------------------------------------------------------  IN: 0 mL / OUT: 2150 mL / NET: -2150 mL    08-09-18 @ 07:01  -  08-09-18 @ 13:38  --------------------------------------------------------  IN: 592 mL / OUT: 1000 mL / NET: -408 mL      Physical Exam:  	Gen: NAD, ill -appearing  	HEENT: PERRL, supple neck, Pale,  	Pulm: CTA B/L  	CV: RRR, S1S2; no rub  	Back: No spinal or CVA tenderness; no sacral edema  	Abd: +BS, soft, nontender/nondistended  	: No suprapubic tenderness  	UE: Warm, FROM, no clubbing, intact strength; no edema; no asterixis  	LE: Warm, FROM, no clubbing, intact strength; no edema  	Neuro: No focal deficits, intact gait  	Psych: Normal affect and mood  	Skin: Warm, without rashes  	Vascular access: AVF,    LABS/STUDIES  --------------------------------------------------------------------------------              7.7    8.2   >-----------<  197      [08-09-18 @ 05:54]              23.7     143  |  104  |  83.0  ----------------------------<  84      [08-09-18 @ 05:54]  4.0   |  22.0  |  4.28        Ca     9.7     [08-09-18 @ 05:54]      Mg     2.4     [08-09-18 @ 05:54]      Phos  5.1     [08-09-18 @ 05:54]    TPro  x   /  Alb  x   /  TBili  x   /  DBili  x   /  AST  x   /  ALT  23  /  AlkPhos  x   [08-09-18 @ 08:58]    Creatinine Trend:  SCr 4.28 [08-09 @ 05:54]  SCr 4.36 [08-08 @ 14:22]    Iron 58, TIBC 310, %sat 19      [08-09-18 @ 05:54]  Ferritin 85      [08-09-18 @ 05:54]  HbA1c 4.9      [08-09-18 @ 05:54]

## 2018-08-09 NOTE — PROGRESS NOTE ADULT - SUBJECTIVE AND OBJECTIVE BOX
Pt on flecainide for "history of VT".  Has CAD and history PTCA.  Recommend amiodarone 200mg po daily instead of flecainide.    New Dempsey MD

## 2018-08-09 NOTE — CONSULT NOTE ADULT - SUBJECTIVE AND OBJECTIVE BOX
[place] [facility], THE HEART CENTER                                   [address] [place] [address]                                                      PHONE: [phone]                                                         FAX: [phone]  [email or web address]  ---------------------------------------------------------------------------------------------------------------------------------    HPI:  CHRISTIANO ELIZABETH is an 84y Male PMHX HTN, HLD, CAD (non obs), hx of VT (no ICD on Flecainide) CKD s/p recent fistula placement, Anemia, DM, carotid stenosis s/p CEA who presented to [facility] ED with worsening Anemia.  Pt admitted with worsening renal failure and possible initiation of HD on this admission.      PAST MEDICAL & SURGICAL HISTORY:  Risk factors for obstructive sleep apnea  Anemia  RICHARDS (dyspnea on exertion)  VT (ventricular tachycardia)  HTN (hypertension)  CAD (coronary artery disease)  CKD (chronic kidney disease): stage IV  Arrhythmia  AV block, 1st degree  DM (diabetes mellitus)  H/O carotid endarterectomy: Right  A-V fistula: left arm 5/2017  H/O angioplasty: 2013,  no  intervention  H/O left knee surgery  H/O circumcision: at  age  65      Plavix (Hives)  Toprol-XL (Rash)      MEDICATIONS  (STANDING):  ascorbic acid 250 milliGRAM(s) Oral daily  aspirin enteric coated 162 milliGRAM(s) Oral daily  atorvastatin 40 milliGRAM(s) Oral at bedtime  calcitriol   Capsule 0.25 MICROGram(s) Oral daily  dextrose 5%. 1000 milliLiter(s) (50 mL/Hr) IV Continuous <Continuous>  dextrose 50% Injectable 12.5 Gram(s) IV Push once  dextrose 50% Injectable 25 Gram(s) IV Push once  dextrose 50% Injectable 25 Gram(s) IV Push once  ferrous    sulfate 325 milliGRAM(s) Oral daily  flecainide 100 milliGRAM(s) Oral every 12 hours  furosemide   Injectable 40 milliGRAM(s) IV Push every 12 hours  hydrALAZINE 50 milliGRAM(s) Oral two times a day  insulin lispro (HumaLOG) corrective regimen sliding scale   SubCutaneous three times a day before meals  multivitamin 1 Tablet(s) Oral daily  NIFEdipine XL 90 milliGRAM(s) Oral daily  sodium bicarbonate 1300 milliGRAM(s) Oral <User Schedule>    MEDICATIONS  (PRN):  dextrose 40% Gel 15 Gram(s) Oral once PRN Blood Glucose LESS THAN 70 milliGRAM(s)/deciliter  glucagon  Injectable 1 milliGRAM(s) IntraMuscular once PRN Glucose LESS THAN 70 milligrams/deciliter      Family History: Pt denies hx of early cad, SCD, or congenital heart disease.      Social History:  Cigarettes:      no             Alchohol:        no     Illicit Drug Abuse:  no    ROS:  Constitutional: No fever, weight loss or fatigue  Eyes: No eye pain, visual disturbances, or discharge  ENMT:  No difficulty hearing, tinnitus, vertigo; No sinus or throat pain  Neck: No pain or stiffness  Respiratory: No cough, wheezing, chills or hemoptysis  Cardiovascular: No chest pain, palpitations, shortness of breath, dizziness or leg swelling  Gastrointestinal: No abdominal or epigastric pain. No nausea, vomiting or hematemesis; No diarrhea or constipation. No melena or hematochezia.  Genitourinary: No dysuria, frequency, hematuria or incontinence  Rectal: No pain, hemorrhoids or incontinence  Neurological: No headaches, memory loss, loss of strength, numbness or tremors  Skin: No itching, burning, rashes or lesions   Lymph Nodes: No enlarged glands  Endocrine: No heat or cold intolerance; No hair loss  Musculoskeletal: No joint pain or swelling; No muscle, back or extremity pain  Psychiatric: No depression, anxiety, mood swings or difficulty sleeping  Heme/Lymph: No easy bruising or bleeding gums  Allergy and Immunologic: No hives or eczema    Vital Signs Last 24 Hrs  T(C): 37.3 (09 Aug 2018 05:40), Max: 37.3 (09 Aug 2018 05:40)  T(F): 99.1 (09 Aug 2018 05:40), Max: 99.1 (09 Aug 2018 05:40)  HR: 65 (09 Aug 2018 05:40) (65 - 97)  BP: 162/50 (09 Aug 2018 05:40) (148/53 - 181/79)  BP(mean): --  RR: 17 (09 Aug 2018 05:40) (16 - 20)  SpO2: 99% (09 Aug 2018 05:40) (96% - 100%)  ICU Vital Signs Last 24 Hrs  CHRISTIANO JHACRYSTAL  I&O's Detail    08 Aug 2018 07:01  -  09 Aug 2018 07:00  --------------------------------------------------------  IN:  Total IN: 0 mL    OUT:    Voided: 2150 mL  Total OUT: 2150 mL    Total NET: -2150 mL      09 Aug 2018 07:01  -  09 Aug 2018 09:46  --------------------------------------------------------  IN:    Oral Fluid: 240 mL  Total IN: 240 mL    OUT:    Voided: 500 mL  Total OUT: 500 mL    Total NET: -260 mL        I&O's Summary    08 Aug 2018 07:01  -  09 Aug 2018 07:00  --------------------------------------------------------  IN: 0 mL / OUT: 2150 mL / NET: -2150 mL    09 Aug 2018 07:01  -  09 Aug 2018 09:46  --------------------------------------------------------  IN: 240 mL / OUT: 500 mL / NET: -260 mL      Drug Dosing Weight  CHRISTIANO PROCRYSTAL      PHYSICAL EXAM:  General: Appears well developed, well nourished alert and cooperative.  HEENT: Head; normocephalic, atraumatic.  Eyes: Pupils reactive, cornea wnl.  Neck: Supple, no nodes adenopathy, no NVD or carotid bruit or thyromegaly.  CARDIOVASCULAR: Normal S1 and S2, No murmur, rub, gallop or lift.   LUNGS: No rales, rhonchi or wheeze. Normal breath sounds bilaterally.  ABDOMEN: Soft, nontender without mass or organomegaly. bowel sounds normoactive.  EXTREMITIES: No clubbing, cyanosis or edema. Distal pulses wnl.   SKIN: warm and dry with normal turgor.  NEURO: Alert/oriented x 3/normal motor exam. No pathologic reflexes.    PSYCH: normal affect.        LABS:                        7.7    8.2   )-----------( 197      ( 09 Aug 2018 05:54 )             23.7     08-09    143  |  104  |  83.0<H>  ----------------------------<  84  4.0   |  22.0  |  4.28<H>    Ca    9.7      09 Aug 2018 05:54  Phos  5.1     08-09  Mg     2.4     08-09    TPro  6.6  /  Alb  4.0  /  TBili  0.2<L>  /  DBili  x   /  AST  18  /  ALT  25  /  AlkPhos  98  08-08    CHRISTIANO ELIZABETH            RADIOLOGY & ADDITIONAL STUDIES:    INTERPRETATION OF TELEMETRY (personally reviewed):    ECG: nsr, nml axis, first degree av block , no st elevations/depression, qt wnl     ECHO: 5/2017   Summary:   1. Left ventricular ejection fraction, by visual estimation, is 55 to   60%.   2. Normal global left ventricular systolic function.   3. Spectral Doppler shows impaired relaxation pattern of left   ventricular myocardial filling (Grade I diastolic dysfunction).   4. There is mild septal left ventricular hypertrophy.   5. Thickening of the anterior and posterior mitral valve leaflets.   6. Mild-moderate tricuspid regurgitation.   7. Sclerotic aortic valve with normal opening.   8. Estimated pulmonary artery systolic pressure is 43.5 mmHg assuming a   right atrial pressure of 8 mmHg, which is consistent with mild pulmonary   hypertension.   9. Mildly enlarged left atrium.    STRESS TEST:    CARDIAC CATHETERIZATION:    Assessment and Plan:  In summary, CHRISTIANO ELIZABETH is an 84y Male with past medical history significant for     Fluid   - as per renal     VT:  -EP eval for alternate for Flecanide given CAD hx       Thank you for allowing [facility] to participate in the care of this patient.  Please feel free to call with any questions or concerns. [place] [facility], THE HEART CENTER                                   [address] [place] [address]                                                      PHONE: [phone]                                                         FAX: [phone]  [email or web address]  ---------------------------------------------------------------------------------------------------------------------------------    HPI:  CHRISTIANO ELIZABETH is an 84y Male PMHX HTN, HLD, CAD (non obs), hx of VT (no ICD on Flecainide) CKD s/p recent fistula placement, Anemia, DM, carotid stenosis s/p CEA who presented to [facility] ED with worsening Anemia.  Pt admitted with worsening renal failure, fluid overload, and possible initiation of HD on this admission.   Pt continues to diurese well with lasix.  Pt denies cp, palps, syncope.    PAST MEDICAL & SURGICAL HISTORY:  Risk factors for obstructive sleep apnea  Anemia  RICHARDS (dyspnea on exertion)  VT (ventricular tachycardia)  HTN (hypertension)  CAD (coronary artery disease)  CKD (chronic kidney disease): stage IV  Arrhythmia  AV block, 1st degree  DM (diabetes mellitus)  H/O carotid endarterectomy: Right  A-V fistula: left arm 5/2017  H/O angioplasty: 2013,  no  intervention  H/O left knee surgery  H/O circumcision: at  age  65      Plavix (Hives)  Toprol-XL (Rash)      MEDICATIONS  (STANDING):  ascorbic acid 250 milliGRAM(s) Oral daily  aspirin enteric coated 162 milliGRAM(s) Oral daily  atorvastatin 40 milliGRAM(s) Oral at bedtime  calcitriol   Capsule 0.25 MICROGram(s) Oral daily  dextrose 5%. 1000 milliLiter(s) (50 mL/Hr) IV Continuous <Continuous>  dextrose 50% Injectable 12.5 Gram(s) IV Push once  dextrose 50% Injectable 25 Gram(s) IV Push once  dextrose 50% Injectable 25 Gram(s) IV Push once  ferrous    sulfate 325 milliGRAM(s) Oral daily  flecainide 100 milliGRAM(s) Oral every 12 hours  furosemide   Injectable 40 milliGRAM(s) IV Push every 12 hours  hydrALAZINE 50 milliGRAM(s) Oral two times a day  insulin lispro (HumaLOG) corrective regimen sliding scale   SubCutaneous three times a day before meals  multivitamin 1 Tablet(s) Oral daily  NIFEdipine XL 90 milliGRAM(s) Oral daily  sodium bicarbonate 1300 milliGRAM(s) Oral <User Schedule>    MEDICATIONS  (PRN):  dextrose 40% Gel 15 Gram(s) Oral once PRN Blood Glucose LESS THAN 70 milliGRAM(s)/deciliter  glucagon  Injectable 1 milliGRAM(s) IntraMuscular once PRN Glucose LESS THAN 70 milligrams/deciliter      Family History: Pt denies hx of early cad, SCD, or congenital heart disease.      Social History:  Cigarettes:      no             Alchohol:        no     Illicit Drug Abuse:  no    ROS:  Constitutional: No fever, weight loss or fatigue  Eyes: No eye pain, visual disturbances, or discharge  ENMT:  No difficulty hearing, tinnitus, vertigo; No sinus or throat pain  Neck: No pain or stiffness  Respiratory: No cough, wheezing, chills or hemoptysis  Cardiovascular: No chest pain, palpitations, shortness of breath, dizziness or leg swelling  Gastrointestinal: No abdominal or epigastric pain. No nausea, vomiting or hematemesis; No diarrhea or constipation. No melena or hematochezia.  Genitourinary: No dysuria, frequency, hematuria or incontinence  Rectal: No pain, hemorrhoids or incontinence  Neurological: No headaches, memory loss, loss of strength, numbness or tremors  Skin: No itching, burning, rashes or lesions   Lymph Nodes: No enlarged glands  Endocrine: No heat or cold intolerance; No hair loss  Musculoskeletal: No joint pain or swelling; No muscle, back or extremity pain  Psychiatric: No depression, anxiety, mood swings or difficulty sleeping  Heme/Lymph: No easy bruising or bleeding gums  Allergy and Immunologic: No hives or eczema    Vital Signs Last 24 Hrs  T(C): 37.3 (09 Aug 2018 05:40), Max: 37.3 (09 Aug 2018 05:40)  T(F): 99.1 (09 Aug 2018 05:40), Max: 99.1 (09 Aug 2018 05:40)  HR: 65 (09 Aug 2018 05:40) (65 - 97)  BP: 162/50 (09 Aug 2018 05:40) (148/53 - 181/79)  BP(mean): --  RR: 17 (09 Aug 2018 05:40) (16 - 20)  SpO2: 99% (09 Aug 2018 05:40) (96% - 100%)  ICU Vital Signs Last 24 Hrs  CHRISTIANO ELIZABETH  I&O's Detail    08 Aug 2018 07:01  -  09 Aug 2018 07:00  --------------------------------------------------------  IN:  Total IN: 0 mL    OUT:    Voided: 2150 mL  Total OUT: 2150 mL    Total NET: -2150 mL      09 Aug 2018 07:01  -  09 Aug 2018 09:46  --------------------------------------------------------  IN:    Oral Fluid: 240 mL  Total IN: 240 mL    OUT:    Voided: 500 mL  Total OUT: 500 mL    Total NET: -260 mL        I&O's Summary    08 Aug 2018 07:01  -  09 Aug 2018 07:00  --------------------------------------------------------  IN: 0 mL / OUT: 2150 mL / NET: -2150 mL    09 Aug 2018 07:01  -  09 Aug 2018 09:46  --------------------------------------------------------  IN: 240 mL / OUT: 500 mL / NET: -260 mL      Drug Dosing Weight  CHRISTIANO ELIZABETH      PHYSICAL EXAM:  General: Appears well developed, well nourished alert and cooperative.  HEENT: Head; normocephalic, atraumatic.  Eyes: Pupils reactive, cornea wnl.  Neck: Supple, no nodes adenopathy, no NVD or carotid bruit or thyromegaly.  CARDIOVASCULAR: Normal S1 and S2, No murmur, rub, gallop or lift.   LUNGS: No rales, rhonchi or wheeze. Normal breath sounds bilaterally.  ABDOMEN: Soft, nontender without mass or organomegaly. bowel sounds normoactive.  EXTREMITIES: No clubbing, cyanosis or edema. Distal pulses wnl.   SKIN: warm and dry with normal turgor.  NEURO: Alert/oriented x 3/normal motor exam. No pathologic reflexes.    PSYCH: normal affect.        LABS:                        7.7    8.2   )-----------( 197      ( 09 Aug 2018 05:54 )             23.7     08-09    143  |  104  |  83.0<H>  ----------------------------<  84  4.0   |  22.0  |  4.28<H>    Ca    9.7      09 Aug 2018 05:54  Phos  5.1     08-09  Mg     2.4     08-09    TPro  6.6  /  Alb  4.0  /  TBili  0.2<L>  /  DBili  x   /  AST  18  /  ALT  25  /  AlkPhos  98  08-08    CHRISTIANO ELIZABETH            RADIOLOGY & ADDITIONAL STUDIES:    INTERPRETATION OF TELEMETRY (personally reviewed):    ECG: nsr, nml axis, first degree av block , no st elevations/depression, qt wnl     ECHO: 5/2017   Summary:   1. Left ventricular ejection fraction, by visual estimation, is 55 to   60%.   2. Normal global left ventricular systolic function.   3. Spectral Doppler shows impaired relaxation pattern of left   ventricular myocardial filling (Grade I diastolic dysfunction).   4. There is mild septal left ventricular hypertrophy.   5. Thickening of the anterior and posterior mitral valve leaflets.   6. Mild-moderate tricuspid regurgitation.   7. Sclerotic aortic valve with normal opening.   8. Estimated pulmonary artery systolic pressure is 43.5 mmHg assuming a   right atrial pressure of 8 mmHg, which is consistent with mild pulmonary   hypertension.   9. Mildly enlarged left atrium.     ECHO 8/9/18   UNOFFICIAL REPORT  EF 55%, aortic sclerosis, mild MR    Assessment and Plan:  In summary,  CHRISTIANO ELIZABETH is an 84y Male PMHX HTN, HLD, CAD (non obs), hx of VT (no ICD on Flecainide) CKD s/p recent fistula placement, Anemia, DM, carotid stenosis s/p CEA who presented to [facility] ED with worsening Anemia.  Pt admitted with worsening renal failure, fluid overload, and possible initiation of HD on this admission.   Pt continues to diurese well with lasix.  Pt denies cp, palps, syncope.    Fluid Overload  -continue lasiux 40 mg IV BID as has good urine outpt  -if outpt decreases may require increased dosing  -further recs as per renal     VT:  -EP eval for alternate for Flecanide given CAD hx     Thank you for allowing [facility] to participate in the care of this patient.  Please feel free to call with any questions or concerns.

## 2018-08-10 ENCOUNTER — TRANSCRIPTION ENCOUNTER (OUTPATIENT)
Age: 83
End: 2018-08-10

## 2018-08-10 VITALS
DIASTOLIC BLOOD PRESSURE: 75 MMHG | HEART RATE: 60 BPM | OXYGEN SATURATION: 98 % | SYSTOLIC BLOOD PRESSURE: 145 MMHG | RESPIRATION RATE: 14 BRPM | TEMPERATURE: 98 F

## 2018-08-10 LAB
ALBUMIN SERPL ELPH-MCNC: 4.1 G/DL
ANION GAP SERPL CALC-SCNC: 16 MMOL/L — SIGNIFICANT CHANGE UP (ref 5–17)
ANION GAP SERPL CALC-SCNC: 18 MMOL/L
BUN SERPL-MCNC: 72 MG/DL
BUN SERPL-MCNC: 85 MG/DL — HIGH (ref 8–20)
CALCIUM SERPL-MCNC: 9.2 MG/DL
CALCIUM SERPL-MCNC: 9.2 MG/DL — SIGNIFICANT CHANGE UP (ref 8.6–10.2)
CHLORIDE SERPL-SCNC: 103 MMOL/L — SIGNIFICANT CHANGE UP (ref 98–107)
CHLORIDE SERPL-SCNC: 105 MMOL/L
CO2 SERPL-SCNC: 21 MMOL/L
CO2 SERPL-SCNC: 24 MMOL/L — SIGNIFICANT CHANGE UP (ref 22–29)
CREAT SERPL-MCNC: 4.16 MG/DL
CREAT SERPL-MCNC: 4.45 MG/DL — HIGH (ref 0.5–1.3)
GLUCOSE BLDC GLUCOMTR-MCNC: 105 MG/DL — HIGH (ref 70–99)
GLUCOSE BLDC GLUCOMTR-MCNC: 158 MG/DL — HIGH (ref 70–99)
GLUCOSE SERPL-MCNC: 75 MG/DL
GLUCOSE SERPL-MCNC: 92 MG/DL — SIGNIFICANT CHANGE UP (ref 70–115)
HAV IGM SER-ACNC: SIGNIFICANT CHANGE UP
HBV CORE AB SER-ACNC: SIGNIFICANT CHANGE UP
HBV CORE IGM SER-ACNC: SIGNIFICANT CHANGE UP
HBV SURFACE AB SER-ACNC: <3 MIU/ML — LOW
HBV SURFACE AG SER-ACNC: SIGNIFICANT CHANGE UP
HCT VFR BLD CALC: 26.5 % — LOW (ref 42–52)
HCV AB S/CO SERPL IA: 0.09 S/CO — SIGNIFICANT CHANGE UP
HCV AB SERPL-IMP: SIGNIFICANT CHANGE UP
HGB BLD-MCNC: 8.5 G/DL — LOW (ref 14–18)
MCHC RBC-ENTMCNC: 29.5 PG — SIGNIFICANT CHANGE UP (ref 27–31)
MCHC RBC-ENTMCNC: 32.1 G/DL — SIGNIFICANT CHANGE UP (ref 32–36)
MCV RBC AUTO: 92 FL — SIGNIFICANT CHANGE UP (ref 80–94)
PHOSPHATE SERPL-MCNC: 4.8 MG/DL
PLATELET # BLD AUTO: 188 K/UL — SIGNIFICANT CHANGE UP (ref 150–400)
POTASSIUM SERPL-MCNC: 3.7 MMOL/L — SIGNIFICANT CHANGE UP (ref 3.5–5.3)
POTASSIUM SERPL-SCNC: 3.7 MMOL/L — SIGNIFICANT CHANGE UP (ref 3.5–5.3)
POTASSIUM SERPL-SCNC: 4.7 MMOL/L
RBC # BLD: 2.88 M/UL — LOW (ref 4.6–6.2)
RBC # FLD: 15.4 % — SIGNIFICANT CHANGE UP (ref 11–15.6)
SODIUM SERPL-SCNC: 143 MMOL/L — SIGNIFICANT CHANGE UP (ref 135–145)
SODIUM SERPL-SCNC: 144 MMOL/L
WBC # BLD: 7.5 K/UL — SIGNIFICANT CHANGE UP (ref 4.8–10.8)
WBC # FLD AUTO: 7.5 K/UL — SIGNIFICANT CHANGE UP (ref 4.8–10.8)

## 2018-08-10 PROCEDURE — 86850 RBC ANTIBODY SCREEN: CPT

## 2018-08-10 PROCEDURE — 82272 OCCULT BLD FECES 1-3 TESTS: CPT

## 2018-08-10 PROCEDURE — 86706 HEP B SURFACE ANTIBODY: CPT

## 2018-08-10 PROCEDURE — 84100 ASSAY OF PHOSPHORUS: CPT

## 2018-08-10 PROCEDURE — 84466 ASSAY OF TRANSFERRIN: CPT

## 2018-08-10 PROCEDURE — P9016: CPT

## 2018-08-10 PROCEDURE — 83550 IRON BINDING TEST: CPT

## 2018-08-10 PROCEDURE — P9040: CPT

## 2018-08-10 PROCEDURE — 99285 EMERGENCY DEPT VISIT HI MDM: CPT

## 2018-08-10 PROCEDURE — 36430 TRANSFUSION BLD/BLD COMPNT: CPT

## 2018-08-10 PROCEDURE — 86704 HEP B CORE ANTIBODY TOTAL: CPT

## 2018-08-10 PROCEDURE — 99223 1ST HOSP IP/OBS HIGH 75: CPT

## 2018-08-10 PROCEDURE — 82962 GLUCOSE BLOOD TEST: CPT

## 2018-08-10 PROCEDURE — 80053 COMPREHEN METABOLIC PANEL: CPT

## 2018-08-10 PROCEDURE — 93306 TTE W/DOPPLER COMPLETE: CPT

## 2018-08-10 PROCEDURE — 85027 COMPLETE CBC AUTOMATED: CPT

## 2018-08-10 PROCEDURE — 86709 HEPATITIS A IGM ANTIBODY: CPT

## 2018-08-10 PROCEDURE — 86923 COMPATIBILITY TEST ELECTRIC: CPT

## 2018-08-10 PROCEDURE — 71045 X-RAY EXAM CHEST 1 VIEW: CPT

## 2018-08-10 PROCEDURE — 82728 ASSAY OF FERRITIN: CPT

## 2018-08-10 PROCEDURE — 86705 HEP B CORE ANTIBODY IGM: CPT

## 2018-08-10 PROCEDURE — 86900 BLOOD TYPING SEROLOGIC ABO: CPT

## 2018-08-10 PROCEDURE — 83735 ASSAY OF MAGNESIUM: CPT

## 2018-08-10 PROCEDURE — 36415 COLL VENOUS BLD VENIPUNCTURE: CPT

## 2018-08-10 PROCEDURE — 93005 ELECTROCARDIOGRAM TRACING: CPT

## 2018-08-10 PROCEDURE — 93990 DOPPLER FLOW TESTING: CPT

## 2018-08-10 PROCEDURE — 85045 AUTOMATED RETICULOCYTE COUNT: CPT

## 2018-08-10 PROCEDURE — 99233 SBSQ HOSP IP/OBS HIGH 50: CPT

## 2018-08-10 PROCEDURE — 87340 HEPATITIS B SURFACE AG IA: CPT

## 2018-08-10 PROCEDURE — 86901 BLOOD TYPING SEROLOGIC RH(D): CPT

## 2018-08-10 PROCEDURE — 80048 BASIC METABOLIC PNL TOTAL CA: CPT

## 2018-08-10 PROCEDURE — 82607 VITAMIN B-12: CPT

## 2018-08-10 PROCEDURE — 86803 HEPATITIS C AB TEST: CPT

## 2018-08-10 PROCEDURE — 99239 HOSP IP/OBS DSCHRG MGMT >30: CPT

## 2018-08-10 PROCEDURE — 83036 HEMOGLOBIN GLYCOSYLATED A1C: CPT

## 2018-08-10 PROCEDURE — 82746 ASSAY OF FOLIC ACID SERUM: CPT

## 2018-08-10 PROCEDURE — 84460 ALANINE AMINO (ALT) (SGPT): CPT

## 2018-08-10 RX ORDER — ERYTHROPOIETIN 10000 [IU]/ML
40000 INJECTION, SOLUTION INTRAVENOUS; SUBCUTANEOUS ONCE
Qty: 0 | Refills: 0 | Status: COMPLETED | OUTPATIENT
Start: 2018-08-10 | End: 2018-08-10

## 2018-08-10 RX ADMIN — CALCITRIOL 0.25 MICROGRAM(S): 0.5 CAPSULE ORAL at 11:08

## 2018-08-10 RX ADMIN — Medication 250 MILLIGRAM(S): at 11:06

## 2018-08-10 RX ADMIN — Medication 50 MILLIGRAM(S): at 05:12

## 2018-08-10 RX ADMIN — ERYTHROPOIETIN 40000 UNIT(S): 10000 INJECTION, SOLUTION INTRAVENOUS; SUBCUTANEOUS at 14:33

## 2018-08-10 RX ADMIN — Medication 100 MILLIGRAM(S): at 05:12

## 2018-08-10 RX ADMIN — Medication 90 MILLIGRAM(S): at 05:12

## 2018-08-10 RX ADMIN — Medication 40 MILLIGRAM(S): at 05:11

## 2018-08-10 RX ADMIN — Medication 162 MILLIGRAM(S): at 11:07

## 2018-08-10 RX ADMIN — Medication 1 TABLET(S): at 11:07

## 2018-08-10 NOTE — DISCHARGE NOTE ADULT - HOSPITAL COURSE
Pt is 84 M hx of Ventricular Tachycardia (on Flecainide no PPM), Non-obstructive CAD, Chronic Kidney Disease Stage 5 (not on RRT), s/p AVF left arm but never been started on HD,  T2DM, HTN, HLD, presents to [facility] ED from Nephrologist Dr. Avendano's office for worsened anemia hemoglobin 6s. In ER hemodynamically stable, hemoglobin 6.8 and has chronic renal failure. Admitted to medicine for acute on chronic anemia and worsening renal failure. All home meds resumed. Evaluated by renal, HD recommended however refused by patient and his daughters. Pt S/p pRBC transfusion and hemoglobin. Anemia likely from renal failure however had positive occult blood, seen by GI and family /daughter wants to follow up as an outpatient with GI for further work up. Pt also seen by cardiology and recommendation appreciated. EP consult refused by Pt's daughter and they want to follow his own cardiology Dr Lara as an outpatient.  Pt is stable for discharge today with outpatient follow ups.      PHYSICAL EXAM:    Vital Signs Last 24 Hrs  T(C): 36.8 (10 Aug 2018 05:07), Max: 36.8 (09 Aug 2018 14:00)  T(F): 98.3 (10 Aug 2018 05:07), Max: 98.3 (09 Aug 2018 14:00)  HR: 59 (10 Aug 2018 08:00) (58 - 72)  BP: 150/78 (10 Aug 2018 08:00) (150/78 - 166/62)  BP(mean): --  RR: 15 (10 Aug 2018 08:00) (15 - 20)  SpO2: 99% (10 Aug 2018 08:00) (99% - 100%)    GENERAL: Pt lying comfortably, NAD.  CHEST/LUNG: Clear to auscultation bilaterally; No wheezing.  HEART: S1S2+, Regular rate and rhythm; No murmurs.  ABDOMEN: Soft, Nontender, Nondistended; Bowel sounds present.  Extremities: 2+ LE edema, pulses+  NEURO: AAOX3, no focal deficits, no motor r sensory loss.  PSYCH: normal mood.    Total time spent on discharge 40 minutes.

## 2018-08-10 NOTE — PROGRESS NOTE ADULT - SUBJECTIVE AND OBJECTIVE BOX
Vascular studies reviewed with Dr Howell  Pt with suboptimal flow volumes and small diameter vessel suggestive of immature L AVF  Will likely need fistulagram and balloon assisted maturation  If HD is going to be initiated will need Rosendo and adarsh estes

## 2018-08-10 NOTE — PROGRESS NOTE ADULT - ASSESSMENT
Patient w. Uremia, Anemia & Salt & water Retention,    AMY - Hyporesponsive,    AVF - ? Mature ,    s/p PRBC ; HGB up to 8.5    Getting 40k units procrit x 1 today    Family wishes for pt to go home    Dr Avendano has ordered outpt procrit; prior authorization required and being started by office    to f/u with Dr Avendano next week in office

## 2018-08-10 NOTE — DISCHARGE NOTE ADULT - MEDICATION SUMMARY - MEDICATIONS TO TAKE
I will START or STAY ON the medications listed below when I get home from the hospital:    Aspirin Enteric Coated 81 mg oral delayed release tablet  -- 2 tab(s) by mouth once a day  -- Indication: For CAD    sodium bicarbonate  -- 1300 milligram(s) by mouth once a day with breakfast  -- Indication: For CKD    flecainide 100 mg oral tablet  -- 1 tab(s) by mouth every 12 hours  -- Indication: For VT    glipiZIDE 5 mg oral tablet  -- 1 tab(s) by mouth once a day, As Needed when FS > 110 in the AM  -- Indication: For DM    atorvastatin 40 mg oral tablet  -- 1 tab(s) by mouth once a day (at bedtime)  -- Indication: For CAD    NIFEdipine 90 mg oral tablet, extended release  -- 1 tab(s) by mouth once a day  -- Indication: For HTN    NIFEdipine 60 mg oral tablet, extended release  -- 1 tab(s) by mouth once a day  -- Indication: For HTN    torsemide 20 mg oral tablet  -- 1 tab(s) by mouth once a day  -- Indication: For Home meds    ferrous sulfate 325 mg (65 mg elemental iron) oral delayed release tablet  -- 1 tab(s) by mouth once a day  -- Indication: For Anemia    hydrALAZINE 50 mg oral tablet  -- 1 tab(s) by mouth 2 times a day  -- Indication: For HTN    Nephro-Thomas oral tablet  -- 1 tab(s) by mouth once a day  -- Indication: For CKD    calcitriol 0.25 mcg oral capsule  -- 1 cap(s) by mouth once a day  -- Indication: For CKD    Vitamin C 250 mg oral tablet  -- 1 tab(s) by mouth once a day  -- Indication: For supplements

## 2018-08-10 NOTE — DISCHARGE NOTE ADULT - PATIENT PORTAL LINK FT
You can access the [facility] Patient Portal, offered by [facility], by registering with the following website: [email or web address]

## 2018-08-10 NOTE — CONSULT NOTE ADULT - CONSULT REASON
Admission for Anemia, Renal Failure
CLAUDIA
Profound Anemia ( Hgb., in Office 6.7 gms., )
Renal Failure

## 2018-08-10 NOTE — DISCHARGE NOTE ADULT - CARE PROVIDER_API CALL
Jeet Bryan), Infectious Disease; Internal Medicine  [address] [address]  Phone: [phone]  Fax: [phone]    Bay Avendano), Internal Medicine; Nephrology  [address] [place] [address]  Phone: [phone]  Fax: [phone]    Mango Peterson), Cardiovascular Disease; Internal Medicine  [address] [place] [address]  Phone: [phone]  Fax: [phone]    Star Trejo), Gastroenterology; Internal Medicine  [address] [facility] [address]  [place] [address]  Phone: [phone]  Fax: [phone]

## 2018-08-10 NOTE — PROGRESS NOTE ADULT - SUBJECTIVE AND OBJECTIVE BOX
[facility], THE HEART CENTER                                   [address] [place] [address]                                                      PHONE: [phone]                                                         FAX: [phone]  [email or web address]  ---------------------------------------------------------------------------------------------------------------------------------    Overnight events/patient complaints:    Patient feels much improved    No SOB or CP      PAST MEDICAL & SURGICAL HISTORY:  Risk factors for obstructive sleep apnea  Anemia  RICHARDS (dyspnea on exertion)  VT (ventricular tachycardia)  HTN (hypertension)  CAD (coronary artery disease)  CKD (chronic kidney disease): stage IV  Arrhythmia  AV block, 1st degree  DM (diabetes mellitus)  H/O carotid endarterectomy: Right  A-V fistula: left arm 5/2017  H/O angioplasty: 2013,  no  intervention  H/O left knee surgery  H/O circumcision: at  age  65      Plavix (Hives)  Toprol-XL (Rash)    MEDICATIONS  (STANDING):  ascorbic acid 250 milliGRAM(s) Oral daily  aspirin enteric coated 162 milliGRAM(s) Oral daily  atorvastatin 40 milliGRAM(s) Oral at bedtime  calcitriol   Capsule 0.25 MICROGram(s) Oral daily  dextrose 5%. 1000 milliLiter(s) (50 mL/Hr) IV Continuous <Continuous>  dextrose 50% Injectable 12.5 Gram(s) IV Push once  dextrose 50% Injectable 25 Gram(s) IV Push once  dextrose 50% Injectable 25 Gram(s) IV Push once  epoetin juan Injectable 8000 Unit(s) SubCutaneous <User Schedule>  flecainide 100 milliGRAM(s) Oral two times a day  furosemide   Injectable 40 milliGRAM(s) IV Push every 12 hours  hydrALAZINE 50 milliGRAM(s) Oral two times a day  insulin lispro (HumaLOG) corrective regimen sliding scale   SubCutaneous three times a day before meals  multivitamin 1 Tablet(s) Oral daily  NIFEdipine XL 90 milliGRAM(s) Oral daily    MEDICATIONS  (PRN):  dextrose 40% Gel 15 Gram(s) Oral once PRN Blood Glucose LESS THAN 70 milliGRAM(s)/deciliter  glucagon  Injectable 1 milliGRAM(s) IntraMuscular once PRN Glucose LESS THAN 70 milligrams/deciliter      Vital Signs Last 24 Hrs  T(C): 36.8 (10 Aug 2018 05:07), Max: 37 (09 Aug 2018 10:35)  T(F): 98.3 (10 Aug 2018 05:07), Max: 98.6 (09 Aug 2018 10:35)  HR: 58 (10 Aug 2018 05:07) (58 - 72)  BP: 158/64 (10 Aug 2018 05:07) (158/60 - 166/62)  BP(mean): --  RR: 16 (10 Aug 2018 05:07) (16 - 20)  SpO2: 99% (10 Aug 2018 05:07) (99% - 100%)  ICU Vital Signs Last 24 Hrs  CHRISTIANO ELIZABETH  I&O's Detail    09 Aug 2018 07:01  -  10 Aug 2018 07:00  --------------------------------------------------------  IN:    Oral Fluid: 960 mL    Packed Red Blood Cells: 352 mL  Total IN: 1312 mL    OUT:    Voided: 1780 mL  Total OUT: 1780 mL    Total NET: -468 mL      10 Aug 2018 07:01  -  10 Aug 2018 09:40  --------------------------------------------------------  IN:  Total IN: 0 mL    OUT:    Voided: 850 mL  Total OUT: 850 mL    Total NET: -850 mL        I&O's Summary    09 Aug 2018 07:01  -  10 Aug 2018 07:00  --------------------------------------------------------  IN: 1312 mL / OUT: 1780 mL / NET: -468 mL    10 Aug 2018 07:01  -  10 Aug 2018 09:40  --------------------------------------------------------  IN: 0 mL / OUT: 850 mL / NET: -850 mL      Drug Dosing Weight  CHRISTIANO ELIZABETH      PHYSICAL EXAM:  General: Appears well developed, well nourished alert and cooperative.  HEENT: Head; normocephalic, atraumatic.  Eyes: Pupils reactive, cornea wnl.  Neck: Supple, no nodes adenopathy, no NVD or carotid bruit or thyromegaly.  CARDIOVASCULAR: Normal S1 and S2, 2/6 systolic murmur, rub, gallop or lift.   LUNGS: No rales, rhonchi or wheeze. Normal breath sounds bilaterally.  ABDOMEN: Soft, nontender without mass or organomegaly. bowel sounds normoactive.  EXTREMITIES: No clubbing, cyanosis or edema. Distal pulses wnl.   SKIN: warm and dry with normal turgor.  NEURO: Alert/oriented x 3/normal motor exam. No pathologic reflexes.    PSYCH: normal affect.        LABS:                        8.5    7.5   )-----------( 188      ( 10 Aug 2018 05:56 )             26.5     08-10    143  |  103  |  85.0<H>  ----------------------------<  92  3.7   |  24.0  |  4.45<H>    Ca    9.2      10 Aug 2018 05:56  Phos  5.1     08-09  Mg     2.4     08-09    TPro  x   /  Alb  x   /  TBili  x   /  DBili  x   /  AST  x   /  ALT  23  /  AlkPhos  x   08-09    CHRISTIANO ELIZABETH            RADIOLOGY & ADDITIONAL STUDIES:    INTERPRETATION OF TELEMETRY (personally reviewed):    ECG:    ECHO:  < from: TTE Echo Complete w/Doppler (08.09.18 @ 09:42) >  Summary:   1. Left ventricular ejection fraction, by visual estimation, is >75%.   2. Hyperdynamic global left ventricular systolic function.   3. Normal left ventricular internal cavity size.   4. Spectral Doppler shows pseudonormal pattern of left ventricular   myocardial filling (Grade II diastolic dysfunction).   5. Severe left atrial enlargement with LA volume index of 64.9 ml/m2.   6. There is no evidence of pericardial effusion.   7. Moderate mitral annular calcification.   8. Thickening of the anterior and posterior mitral valve leaflets.   9. Trace tricuspid regurgitation.  10. Pulmonic valve regurgitation.  11. Estimated pulmonary artery systolic pressure is 45.9 mmHg assuming a   right atrial pressure of 8 mmHg, which is consistent with mild pulmonary   hypertension.    [id number] Montoya MD, Electronically signed on 8/9/2018 at 9:01:48   PM              *** Final ***                  ROSANGELA MONTOYA M.D.  This document has been electronically signed. Aug  9 2018  9:42AM        < end of copied text >        STRESS TEST:    CARDIAC CATHETERIZATION:    ASSESSMENT AND PLAN:  CHRISTIANO ELIZABETH is an 84y Male PMHX HTN, HLD, CAD (non obs), hx of VT (no ICD on Flecainide) CKD s/p recent fistula placement, Anemia, DM, carotid stenosis s/p CEA who presented to [facility] ED with worsening Anemia.  Pt admitted with worsening renal failure, fluid overload, and possible initiation of HD on this admission.   Pt continues to diurese well with lasix.  Pt denies cp, palps, syncope.    Patient is clinically improved.  Can change lasix to po  Will s/o; Call if needed      Thank you for allowing [facility] to participate in the care of this patient.  Please feel free to call with any questions or concerns.

## 2018-08-10 NOTE — CONSULT NOTE ADULT - ASSESSMENT
ASSESSMENT AND PLAN: 84M being admitted for symptomatic anemia likely 2/2 progressive renal failure likely will need HD as per Nephrology consultation.    1. Anemia - To get 1U pRBC, check post transfusion CBC, monitor for signs and symptoms of bleeding. Appreciate Renal recs for Procrit PRN  2. Hx of Vtach - c/w Flecanide as previously prescribed, admit to telemetry   3. CAD - c/w  atorvastatin as previously prescribed. Patient not describing chest pain at this time, consider echocardiogram if patient with persistent dyspnea after HD and blood transfusion. Can c/w aspirin tomorrow assuming no issues with blood transfusion AND anemia corrects appropriately after 1U pRBC  4. ESRD - likely will need HD given intermittent LE edema, anemia, follow up renal recs, c/w calcitriol and sodium bicarbonate  5. HTN - c/w Hydralazine and Nifedipine as previously prescribed   6. T2DM - c/w ISS and check FS qid     Care plan discussed with Dr. Fish, 4:30 PM   Dr. Johns's office contacted to notify that his patient is admitted to [facility]
In summary, this is an 84-year-old man with end-stage renal disease on dialysis and other medical comorbidities was referred to the emergency department for anemia and possible initiation of dialysis.  Gastroenterology has been consulted for a possible workup for any source of gastrointestinal bleeding.  From a gastrointestinal perspective, the patient is asymptomatic.  He does have black stool on exam, but he takes supplemental iron.  His being guaiac-positive of no import, as this test can be falsely positive for many reasons.  Still, doing a gastrointestinal workup for his iron deficiency anemia is not an unreasonable course of action.  The real questions are, how invasive does he wish this work up to be, and will it .  I offered the patient both an EGD and colonoscopy and advised him to think it over and discuss it with his family.  He expressed a very strong desire to go home, so I do not think that this gastrointestinal workup is something that should keep him hospitalized, as we can arrange for him to have both the EGD and colonoscopy as an outpatient at [facility].    Thank you for the consult.  Please do not hesitate to call any questions or concerns.
· HPI Objective Statement: 83 y/o M pt with hx of CKD- 4 , AV block CAD, DM, HTN,	         Past Medical History:    Anemia    Arrhythmia    AV block, 1st degree    CAD (coronary artery disease)    CKD (chronic kidney disease)  stage IV  DM (diabetes mellitus)    HTN (hypertension)    VT (ventricular tachycardia).    Past Surgical History:    A-V fistula  left arm 5/2017  H/O angioplasty  2013,  no  intervention  H/O carotid endarterectomy  Right  H/O circumcision  at  age  65  H/O left knee surgery.      Allergies:  	Plavix: Drug, Hives  	Toprol-XL: Drug, Confirmed,     Home Medications:   * Patient Currently Takes Medications as of 23-May-2018 15:03 documented in Structured Notes  · 	omeprazole 40 mg oral delayed release capsule: 1 cap(s) orally once a day   · 	torsemide 20 mg oral tablet: 2 tab(s) orally once a day   · 	traMADol 50 mg oral tablet: 1 tab(s) orally every 4-6 hours as needed for severe pain. MDD:6  · 	atorvastatin 40 mg oral tablet: 1 tab(s) orally once a day (at bedtime)  · 	hydrALAZINE 50 mg oral tablet: 1 tab(s) orally every 8 hours  · 	docusate sodium 100 mg oral capsule: 1 cap(s) orally 2 times a day  · 	aspirin 81 mg oral delayed release tablet: 2 tab(s) orally once a day  · 	tamsulosin 0.4 mg oral capsule: 1 cap(s) orally once a day (at bedtime)   · 	acetaminophen 325 mg oral tablet: 2 tab(s) orally every 6 hours, As needed, Mild Pain (1 - 3)  · 	torsemide 20 mg oral tablet: 1 tab(s) orally once a day  · 	calcitriol 0.25 mcg oral capsule: 1 cap(s) orally once a day  · 	Vitamin C 250 mg oral tablet: 1 tab(s) orally once a day  · 	flecainide 100 mg oral tablet: 1 tab(s) orally every 12 hours  · 	glipiZIDE 5 mg oral tablet: 1 tab(s) orally once a day  · 	Nephro-Thomas oral tablet: 1 tab(s) orally once a day  · 	sodium bicarbonate 650 mg oral tablet: 1 tab(s) orally 4 times a day  · 	ferrous sulfate 325 mg (65 mg elemental iron) oral delayed release tablet: 1 tab(s) orally once a day  · 	NIFEdipine 90 mg oral tablet, extended release: 1 tab(s) orally once a day  · 	NIFEdipine 60 mg oral tablet, extended release: 1 tab(s) orally once a day    VITAL SIGNS( Pullset):       08-Aug-2018 13:30	  · Temp (F): 98	  · Temp (C) Temp (C): 36.7	  · Heart Rate Heart Rate (beats/min): 66	  · BP Systolic Systolic: 148	  · BP Diastolic Diastolic (mm Hg):  53	  · Respiration Rate (breaths/min) Respiration Rate (breaths/min): 17	  · SpO2 (%) SpO2 (%): 99	    13:31	  · How was weight captured? Weight Type/Method: stated	  · Dosing Weight (KILOGRAMS) Dosing Weight (KILOGRAMS): 78.9	  · Dosing Weight  (POUNDS) Dosing Weight (POUNDS): 173.9	  · Height type Height Type: stated	  · Height (FEET) Height (FEET): 5	  · Height (INCHES) Height (INCHES): 5	  · Height (CENTIMETERS) Height (CENTIMETERS): 165.1	  · BSA (m2): 1.86	  · BMI (kG/m2) BMI (kG/m2): 28.9	  · Preferred Language to Address Healthcare Preferred Language to Address Healthcare: English	        Hgb., RP - Pending.    P : Transfusion,     Initiate HD,

## 2018-08-10 NOTE — DISCHARGE NOTE ADULT - CARE PROVIDERS DIRECT ADDRESSES
,DirectAddress_Unknown,[email or web address],DirectAddress_Unknown,[email or web address]

## 2018-08-10 NOTE — CONSULT NOTE ADULT - SUBJECTIVE AND OBJECTIVE BOX
REASON FOR ADMISSION: anemia    HISTORY OF PRESENT ILLNESS:  This is a siri 84-year-old gentleman with a past medical history of end-stage renal disease not yet on dialysis referral arterial disease status post right carotid endarterectomy, and ventricular tachycardia on an antiarrhythmic was referred to emergency department by his nephrologist after labs revealed a hemoglobin of 6.  The patient had been experiencing some worsening dyspnea on exertion but otherwise has been feeling well.  He has known iron deficiency anemia and has been on supplemental iron.  To this point, his iron deficiency anemia has been thought to be secondary to his renal failure, but his stool was guaiac positive in the emergency department, prompting gastroenterology consultation.  The patient denies any blood in his stool, dysphagia, odynophagia, changes in his bowel habits, appetite or weight.  Of note, he lost both a brother and sister secondary to colorectal cancer.  He has undergone a colonoscopy in the past that was approximately 6 years ago and was reportedly normal.    REVIEW OF SYSTEMS:  Constitutional:  No unintentional weight loss, fevers, chills or night sweats	  Eyes: No eye pain, redness, discharge, or proptosis  ENMT: No sore throat, ear pain, mouth sores, or swollen glands in the neck  Respiratory: No cough or wheezing  Cardiovascular: No chest pain, dyspnea on exertion, or orthopnea  Gastrointestinal:	Please see HPI  Genitourinary: No dysuria or hematuria  Neurological:	 No changes in sleep/wake cycle, convulsions, confusion, dizziness or lightheadedness  Psychiatric: No changes in personality or emotional problems   Hematology: No easy bruising   Endocrine: No hot or cold flashes or deepening of voice	  All other review of systems were completed and were otherwise negative save what is reported in the HPI.    PAST MEDICAL/SURGICAL HISTORY:  Risk factors for obstructive sleep apnea  Anemia  RICHARDS (dyspnea on exertion)  VT (ventricular tachycardia)  HTN (hypertension)  CAD (coronary artery disease)  CKD (chronic kidney disease): stage IV  Arrhythmia  AV block, 1st degree  DM (diabetes mellitus)  H/O carotid endarterectomy: Right  A-V fistula: left arm 5/2017  H/O angioplasty: 2013,  no  intervention  H/O left knee surgery  H/O circumcision: at  age  65    SOCIAL HISTORY:  - TOBACCO: Denies  - ALCOHOL: Denies  - ILLICIT DRUG USE: Denies    FAMILY HISTORY:  No known history of gastrointestinal or liver disease;  Family history of premature CAD  Family history of lung cancer (Father)  Family history of cancer    HOME MEDICATIONS:  Aspirin Enteric Coated 81 mg oral delayed release tablet: 2 tab(s) orally once a day (08 Aug 2018 17:11)  atorvastatin 40 mg oral tablet: 1 tab(s) orally once a day (at bedtime) (04 May 2018 07:53)  calcitriol 0.25 mcg oral capsule: 1 cap(s) orally once a day (04 May 2018 07:53)  ferrous sulfate 325 mg (65 mg elemental iron) oral delayed release tablet: 1 tab(s) orally once a day (04 May 2018 07:53)  flecainide 100 mg oral tablet: 1 tab(s) orally every 12 hours (04 May 2018 07:53)  glipiZIDE 5 mg oral tablet: 1 tab(s) orally once a day, As Needed when FS &gt; 110 in the AM (08 Aug 2018 17:11)  hydrALAZINE 50 mg oral tablet: 1 tab(s) orally 2 times a day (08 Aug 2018 17:11)  Nephro-Thomas oral tablet: 1 tab(s) orally once a day (04 May 2018 07:53)  NIFEdipine 60 mg oral tablet, extended release: 1 tab(s) orally once a day (04 May 2018 07:53)  NIFEdipine 90 mg oral tablet, extended release: 1 tab(s) orally once a day (04 May 2018 07:53)  sodium bicarbonate: 1300 milligram(s) orally once a day with breakfast (08 Aug 2018 17:11)  torsemide 20 mg oral tablet: 1 tab(s) orally once a day (04 May 2018 07:53)  Vitamin C 250 mg oral tablet: 1 tab(s) orally once a day (04 May 2018 07:53)    INPATIENT MEDICATIONS:  MEDICATIONS  (STANDING):  ascorbic acid 250 milliGRAM(s) Oral daily  aspirin enteric coated 162 milliGRAM(s) Oral daily  atorvastatin 40 milliGRAM(s) Oral at bedtime  calcitriol   Capsule 0.25 MICROGram(s) Oral daily  dextrose 5%. 1000 milliLiter(s) (50 mL/Hr) IV Continuous <Continuous>  dextrose 50% Injectable 12.5 Gram(s) IV Push once  dextrose 50% Injectable 25 Gram(s) IV Push once  dextrose 50% Injectable 25 Gram(s) IV Push once  epoetin juan Injectable 8000 Unit(s) SubCutaneous <User Schedule>  flecainide 100 milliGRAM(s) Oral two times a day  furosemide   Injectable 40 milliGRAM(s) IV Push every 12 hours  hydrALAZINE 50 milliGRAM(s) Oral two times a day  insulin lispro (HumaLOG) corrective regimen sliding scale   SubCutaneous three times a day before meals  multivitamin 1 Tablet(s) Oral daily  NIFEdipine XL 90 milliGRAM(s) Oral daily    MEDICATIONS  (PRN):  dextrose 40% Gel 15 Gram(s) Oral once PRN Blood Glucose LESS THAN 70 milliGRAM(s)/deciliter  glucagon  Injectable 1 milliGRAM(s) IntraMuscular once PRN Glucose LESS THAN 70 milligrams/deciliter    ALLERGIES:  Plavix (Hives)  Toprol-XL (Rash)    VITAL SIGNS LAST 24 HOURS:  T(C): 36.8 (10 Aug 2018 05:07), Max: 36.8 (09 Aug 2018 14:00)  T(F): 98.3 (10 Aug 2018 05:07), Max: 98.3 (09 Aug 2018 14:00)  HR: 59 (10 Aug 2018 08:00) (58 - 72)  BP: 150/78 (10 Aug 2018 08:00) (150/78 - 166/62)  BP(mean): --  RR: 15 (10 Aug 2018 08:00) (15 - 20)  SpO2: 99% (10 Aug 2018 08:00) (99% - 100%)    PHYSICAL EXAM:  Constitutional: Well-developed, well-nourished elderly man in no apparent distress  Eyes: Sclerae anicteric, conjunctivae normal  ENMT: Mucus membranes moist, no oropharyngeal thrush noted  Neck: No thyroid nodules appreciated, no significant cervical or supraclavicular lymphadenopathy  Respiratory: Breathing nonlabored; clear to auscultation  Cardiovascular: Regular rate and rhythm  Gastrointestinal: Soft, nontender, nondistended, normoactive bowel sounds; no hepatosplenomegaly appreciated; no rebound tenderness or involuntary guarding  Rectal: Perianal exam notable for skin tag; normal sphincter tone; black stool on glove  Extremities: No clubbing, cyanosis or edema  Neurological: Alert and oriented to person, place and time; no asterixis  Skin: No jaundice  Lymph Nodes: No significant lymphadenopathy  Musculoskeletal: No significant peripheral atrophy  Psychiatric: Affect and mood appropriate      LABS:                        8.5    7.5   )-----------( 188      ( 10 Aug 2018 05:56 )             26.5       08-10    143  |  103  |  85.0<H>  ----------------------------<  92  3.7   |  24.0  |  4.45<H>    Ca    9.2      10 Aug 2018 05:56  Phos  5.1     08-09  Mg     2.4     08-09    TPro  x   /  Alb  x   /  TBili  x   /  DBili  x   /  AST  x   /  ALT  23  /  AlkPhos  x   08-09    LIVER FUNCTIONS - ( 09 Aug 2018 08:58 )  Alb: x     / Pro: x     / ALK PHOS: x     / ALT: 23 U/L / AST: x     / GGT: x

## 2018-08-10 NOTE — DISCHARGE NOTE ADULT - CARE PLAN
Principal Discharge DX:	Anemia, unspecified type  Goal:	Maintain Hemoglobin.  Assessment and plan of treatment:	S/p 2 units pRBCs, hemoglobin stable. Could be from renal failure but occult blood positive. Per family outpatient f/u with GI in 1 week, needs further work up as an outpatient.  Procrit per renal as an outpatient.  Secondary Diagnosis:	Stage 5 chronic kidney disease not on chronic dialysis  Assessment and plan of treatment:	Outpatient follow up with renal Dr Avendano for further management.  Secondary Diagnosis:	CAD (coronary artery disease)  Assessment and plan of treatment:	Resume outpatient home meds and f/u with cardiology.  Secondary Diagnosis:	VT (ventricular tachycardia)  Assessment and plan of treatment:	Resume outpatient home meds and f/u with cardiology.  Secondary Diagnosis:	Essential hypertension  Assessment and plan of treatment:	Resume outpatient home meds and f/u with cardiology.  Secondary Diagnosis:	DM (diabetes mellitus)  Assessment and plan of treatment:	A1C 4.9, very tight control, monitor fingerstick at home- if low do not take home meds. please discuss with your PCP stopping diabatic meds due to tight control A1C 4.9

## 2018-08-10 NOTE — DISCHARGE NOTE ADULT - PLAN OF CARE
Maintain Hemoglobin. S/p 2 units pRBCs, hemoglobin stable. Could be from renal failure but occult blood positive. Per family outpatient f/u with GI in 1 week, needs further work up as an outpatient.  Procrit per renal as an outpatient. Outpatient follow up with renal Dr Avendano for further management. Resume outpatient home meds and f/u with cardiology. A1C 4.9, very tight control, monitor fingerstick at home- if low do not take home meds. please discuss with your PCP stopping diabatic meds due to tight control A1C 4.9

## 2018-08-10 NOTE — DISCHARGE NOTE ADULT - SECONDARY DIAGNOSIS.
Stage 5 chronic kidney disease not on chronic dialysis CAD (coronary artery disease) VT (ventricular tachycardia) Essential hypertension DM (diabetes mellitus)

## 2018-08-10 NOTE — PROGRESS NOTE ADULT - SUBJECTIVE AND OBJECTIVE BOX
[facility] DIVISION OF KIDNEY DISEASES AND HYPERTENSION -- FOLLOW UP NOTE  --------------------------------------------------------------------------------  Chief Complaint: Anemia    24 hour events/subjective:  Pt seen and examined  No distress;  s/p PRBC  Daughter present; appears well      PAST HISTORY  --------------------------------------------------------------------------------  No significant changes to PMH, PSH, FHx, SHx, unless otherwise noted    ALLERGIES & MEDICATIONS  --------------------------------------------------------------------------------  Allergies    Plavix (Hives)  Toprol-XL (Rash)    Intolerances      Standing Inpatient Medications  ascorbic acid 250 milliGRAM(s) Oral daily  aspirin enteric coated 162 milliGRAM(s) Oral daily  atorvastatin 40 milliGRAM(s) Oral at bedtime  calcitriol   Capsule 0.25 MICROGram(s) Oral daily  dextrose 5%. 1000 milliLiter(s) IV Continuous <Continuous>  dextrose 50% Injectable 12.5 Gram(s) IV Push once  dextrose 50% Injectable 25 Gram(s) IV Push once  dextrose 50% Injectable 25 Gram(s) IV Push once  epoetin juan Injectable 90150 Unit(s) SubCutaneous once  flecainide 100 milliGRAM(s) Oral two times a day  furosemide   Injectable 40 milliGRAM(s) IV Push every 12 hours  hydrALAZINE 50 milliGRAM(s) Oral two times a day  insulin lispro (HumaLOG) corrective regimen sliding scale   SubCutaneous three times a day before meals  multivitamin 1 Tablet(s) Oral daily  NIFEdipine XL 90 milliGRAM(s) Oral daily    PRN Inpatient Medications  dextrose 40% Gel 15 Gram(s) Oral once PRN  glucagon  Injectable 1 milliGRAM(s) IntraMuscular once PRN      REVIEW OF SYSTEMS  --------------------------------------------------------------------------------  Gen: No weight changes, fatigue, fevers/chills, weakness  Skin: No rashes  Head/Eyes/Ears/Mouth: No headache; Normal hearing; Normal vision w/o blurriness; No sinus pain/discomfort, sore throat  Respiratory: No dyspnea, cough, wheezing, hemoptysis  CV: No chest pain, PND, orthopnea  GI: No abdominal pain, diarrhea, constipation, nausea, vomiting, melena, hematochezia  : No increased frequency, dysuria, hematuria, nocturia  MSK: No joint pain/swelling; no back pain; no edema  Neuro: No dizziness/lightheadedness, weakness, seizures, numbness, tingling  Heme: No easy bruising or bleeding  Endo: No heat/cold intolerance  Psych: No significant nervousness, anxiety, stress, depression    All other systems were reviewed and are negative, except as noted.    VITALS/PHYSICAL EXAM  --------------------------------------------------------------------------------  T(C): 36.6 (08-10-18 @ 12:00), Max: 36.8 (08-09-18 @ 14:00)  HR: 60 (08-10-18 @ 12:00) (58 - 72)  BP: 145/75 (08-10-18 @ 12:00) (145/75 - 166/62)  RR: 14 (08-10-18 @ 12:00) (14 - 20)  SpO2: 98% (08-10-18 @ 12:00) (98% - 100%)  Wt(kg): --    Weight (kg): 75 (08-09-18 @ 05:40)      08-09-18 @ 07:01  -  08-10-18 @ 07:00  --------------------------------------------------------  IN: 1312 mL / OUT: 1780 mL / NET: -468 mL    08-10-18 @ 07:01  -  08-10-18 @ 13:44  --------------------------------------------------------  IN: 0 mL / OUT: 1025 mL / NET: -1025 mL      Physical Exam:  	Gen: NAD, well-appearing  	HEENT: PERRL, supple neck, clear oropharynx  	Pulm: CTA B/L  	CV: RRR, S1S2; no rub  	Back: No spinal or CVA tenderness; no sacral edema  	Abd: +BS, soft, nontender/nondistended  	: No suprapubic tenderness  	UE: Warm, FROM, no clubbing, intact strength; no edema; no asterixis  	LE: Warm, FROM, no clubbing, intact strength; no edema  	Neuro: No focal deficits, intact gait  	Psych: Normal affect and mood  	Skin: Warm, without rashes    LABS/STUDIES  --------------------------------------------------------------------------------              8.5    7.5   >-----------<  188      [08-10-18 @ 05:56]              26.5     143  |  103  |  85.0  ----------------------------<  92      [08-10-18 @ 05:56]  3.7   |  24.0  |  4.45        Ca     9.2     [08-10-18 @ 05:56]      Mg     2.4     [08-09-18 @ 05:54]      Phos  5.1     [08-09-18 @ 05:54]    TPro  x   /  Alb  x   /  TBili  x   /  DBili  x   /  AST  x   /  ALT  23  /  AlkPhos  x   [08-09-18 @ 08:58]          Creatinine Trend:  SCr 4.45 [08-10 @ 05:56]  SCr 4.28 [08-09 @ 05:54]  SCr 4.36 [08-08 @ 14:22]        Iron 58, TIBC 310, %sat 19      [08-09-18 @ 05:54]  Ferritin 85      [08-09-18 @ 05:54]  HbA1c 4.9      [08-09-18 @ 05:54]    HBsAb <3.0      [08-09-18 @ 18:29]  HBsAg Nonreact      [08-09-18 @ 18:29]  HBcAb Nonreact      [08-09-18 @ 18:29]  HCV 0.09, Nonreact      [08-09-18 @ 18:29]

## 2018-08-15 ENCOUNTER — APPOINTMENT (OUTPATIENT)
Dept: INTERNAL MEDICINE | Facility: CLINIC | Age: 83
End: 2018-08-15

## 2018-08-17 ENCOUNTER — APPOINTMENT (OUTPATIENT)
Dept: NEPHROLOGY | Facility: CLINIC | Age: 83
End: 2018-08-17
Payer: MEDICARE

## 2018-08-17 VITALS
WEIGHT: 173 LBS | SYSTOLIC BLOOD PRESSURE: 138 MMHG | HEART RATE: 53 BPM | OXYGEN SATURATION: 96 % | DIASTOLIC BLOOD PRESSURE: 58 MMHG | BODY MASS INDEX: 28.82 KG/M2 | HEIGHT: 65 IN

## 2018-08-17 PROCEDURE — 36415 COLL VENOUS BLD VENIPUNCTURE: CPT

## 2018-08-17 PROCEDURE — 99215 OFFICE O/P EST HI 40 MIN: CPT | Mod: 25

## 2018-08-21 ENCOUNTER — RX RENEWAL (OUTPATIENT)
Age: 83
End: 2018-08-21

## 2018-08-22 ENCOUNTER — APPOINTMENT (OUTPATIENT)
Dept: INTERNAL MEDICINE | Facility: CLINIC | Age: 83
End: 2018-08-22
Payer: MEDICARE

## 2018-08-22 ENCOUNTER — OUTPATIENT (OUTPATIENT)
Dept: OUTPATIENT SERVICES | Facility: HOSPITAL | Age: 83
LOS: 1 days | Discharge: HOME | End: 2018-08-22

## 2018-08-22 VITALS
DIASTOLIC BLOOD PRESSURE: 52 MMHG | BODY MASS INDEX: 28.82 KG/M2 | HEART RATE: 64 BPM | SYSTOLIC BLOOD PRESSURE: 155 MMHG | WEIGHT: 173 LBS | HEIGHT: 65 IN

## 2018-08-22 DIAGNOSIS — Z98.62 PERIPHERAL VASCULAR ANGIOPLASTY STATUS: Chronic | ICD-10-CM

## 2018-08-22 DIAGNOSIS — Z98.890 OTHER SPECIFIED POSTPROCEDURAL STATES: Chronic | ICD-10-CM

## 2018-08-22 DIAGNOSIS — D64.9 ANEMIA, UNSPECIFIED: ICD-10-CM

## 2018-08-22 DIAGNOSIS — I77.0 ARTERIOVENOUS FISTULA, ACQUIRED: Chronic | ICD-10-CM

## 2018-08-22 PROCEDURE — 99215 OFFICE O/P EST HI 40 MIN: CPT

## 2018-08-22 RX ORDER — ASPIRIN 325 MG/1
325 TABLET ORAL
Refills: 0 | Status: DISCONTINUED | COMMUNITY
End: 2018-08-22

## 2018-08-22 RX ORDER — NIFEDIPINE 60 MG/1
60 TABLET, FILM COATED, EXTENDED RELEASE ORAL
Refills: 0 | Status: DISCONTINUED | COMMUNITY
Start: 2018-02-21 | End: 2018-08-22

## 2018-08-22 RX ORDER — NIFEDIPINE 60 MG/1
60 TABLET, FILM COATED, EXTENDED RELEASE ORAL
Refills: 0 | Status: DISCONTINUED | COMMUNITY
End: 2018-08-22

## 2018-08-22 NOTE — PLAN
[FreeTextEntry1] : Pt s/phospital stay at [place] In which he was transfused because of severe anemia and was seen by renal.  There  was a long discussion with patient and patient's family including his daughter who is a nurse  about potential for dialysis . The family felt  that he is doing okay and did not want to initiate dialysis at this time since his discharge his has been doing well He reports his sugars have been low and told to stop the glipizide  and [facility]

## 2018-08-22 NOTE — HISTORY OF PRESENT ILLNESS
[FreeTextEntry1] : check up [de-identified] : Pt s/phospital stay at [place] In which he was transfused because of severe anemia and was seen by renal.  There  was a long discussion with patient and patient's family including his daughter who is a nurse  about potential for dialysis . The family felt  that he is doing okay and did not want to initiate dialysis at this time here for follow up

## 2018-08-23 ENCOUNTER — RESULT REVIEW (OUTPATIENT)
Age: 83
End: 2018-08-23

## 2018-08-23 ENCOUNTER — APPOINTMENT (OUTPATIENT)
Dept: HEMATOLOGY ONCOLOGY | Facility: CLINIC | Age: 83
End: 2018-08-23
Payer: MEDICARE

## 2018-08-23 ENCOUNTER — APPOINTMENT (OUTPATIENT)
Dept: GASTROENTEROLOGY | Facility: CLINIC | Age: 83
End: 2018-08-23

## 2018-08-23 VITALS
HEART RATE: 63 BPM | OXYGEN SATURATION: 97 % | SYSTOLIC BLOOD PRESSURE: 177 MMHG | BODY MASS INDEX: 28.23 KG/M2 | WEIGHT: 169.42 LBS | TEMPERATURE: 97.9 F | DIASTOLIC BLOOD PRESSURE: 64 MMHG | HEIGHT: 65 IN

## 2018-08-23 LAB
ALBUMIN SERPL ELPH-MCNC: 4.5 G/DL
ALP BLD-CCNC: 100 U/L
ALT SERPL-CCNC: 20 U/L
ANION GAP SERPL CALC-SCNC: 19 MMOL/L
AST SERPL-CCNC: 18 U/L
BASOPHILS # BLD AUTO: 0.1 K/UL — SIGNIFICANT CHANGE UP (ref 0–0.2)
BASOPHILS NFR BLD AUTO: 1.2 % — SIGNIFICANT CHANGE UP (ref 0–2)
BILIRUB SERPL-MCNC: 0.3 MG/DL
BUN SERPL-MCNC: 74 MG/DL
CALCIUM SERPL-MCNC: 9.5 MG/DL
CHLORIDE SERPL-SCNC: 101 MMOL/L
CO2 SERPL-SCNC: 22 MMOL/L
CREAT SERPL-MCNC: 4.37 MG/DL
EOSINOPHIL # BLD AUTO: 0.1 K/UL — SIGNIFICANT CHANGE UP (ref 0–0.5)
EOSINOPHIL NFR BLD AUTO: 1.5 % — SIGNIFICANT CHANGE UP (ref 0–6)
FERRITIN SERPL-MCNC: 86 NG/ML
FOLATE SERPL-MCNC: >20 NG/ML
GLUCOSE SERPL-MCNC: 118 MG/DL
HCT VFR BLD CALC: 32.4 % — LOW (ref 39–50)
HGB BLD-MCNC: 10.9 G/DL — LOW (ref 13–17)
LYMPHOCYTES # BLD AUTO: 0.9 K/UL — LOW (ref 1–3.3)
LYMPHOCYTES # BLD AUTO: 10.1 % — LOW (ref 13–44)
MCHC RBC-ENTMCNC: 32 PG — SIGNIFICANT CHANGE UP (ref 27–34)
MCHC RBC-ENTMCNC: 33.6 GM/DL — SIGNIFICANT CHANGE UP (ref 32–36)
MCV RBC AUTO: 95.3 FL — SIGNIFICANT CHANGE UP (ref 80–100)
MONOCYTES # BLD AUTO: 0.7 K/UL — SIGNIFICANT CHANGE UP (ref 0–0.9)
MONOCYTES NFR BLD AUTO: 7.2 % — SIGNIFICANT CHANGE UP (ref 2–14)
NEUTROPHILS # BLD AUTO: 7.3 K/UL — SIGNIFICANT CHANGE UP (ref 1.8–7.4)
NEUTROPHILS NFR BLD AUTO: 80 % — HIGH (ref 43–77)
PLATELET # BLD AUTO: 217 K/UL — SIGNIFICANT CHANGE UP (ref 150–400)
POTASSIUM SERPL-SCNC: 4.5 MMOL/L
PROT SERPL-MCNC: 6.7 G/DL
RBC # BLD: 3.4 M/UL — LOW (ref 4.2–5.8)
RBC # FLD: 14.3 % — SIGNIFICANT CHANGE UP (ref 10.3–14.5)
SODIUM SERPL-SCNC: 142 MMOL/L
VIT B12 SERPL-MCNC: 587 PG/ML
WBC # BLD: 9.2 K/UL — SIGNIFICANT CHANGE UP (ref 3.8–10.5)
WBC # FLD AUTO: 9.2 K/UL — SIGNIFICANT CHANGE UP (ref 3.8–10.5)

## 2018-08-23 PROCEDURE — 99204 OFFICE O/P NEW MOD 45 MIN: CPT

## 2018-08-23 RX ORDER — OMEPRAZOLE 40 MG/1
40 CAPSULE, DELAYED RELEASE ORAL
Qty: 21 | Refills: 0 | Status: DISCONTINUED | COMMUNITY
Start: 2018-05-23 | End: 2018-08-23

## 2018-08-23 RX ORDER — TRAMADOL HYDROCHLORIDE 50 MG/1
50 TABLET, COATED ORAL
Qty: 30 | Refills: 0 | Status: DISCONTINUED | COMMUNITY
Start: 2018-05-04 | End: 2018-08-23

## 2018-08-24 LAB — EPO SERPL-MCNC: 11.5 MIU/ML

## 2018-08-30 ENCOUNTER — RESULT REVIEW (OUTPATIENT)
Age: 83
End: 2018-08-30

## 2018-08-30 ENCOUNTER — APPOINTMENT (OUTPATIENT)
Dept: HEMATOLOGY ONCOLOGY | Facility: CLINIC | Age: 83
End: 2018-08-30

## 2018-08-30 ENCOUNTER — APPOINTMENT (OUTPATIENT)
Dept: INFUSION THERAPY | Facility: CLINIC | Age: 83
End: 2018-08-30

## 2018-08-30 LAB
BASOPHILS # BLD AUTO: 0.2 K/UL — SIGNIFICANT CHANGE UP (ref 0–0.2)
BASOPHILS NFR BLD AUTO: 1.8 % — SIGNIFICANT CHANGE UP (ref 0–2)
EOSINOPHIL # BLD AUTO: 0.1 K/UL — SIGNIFICANT CHANGE UP (ref 0–0.5)
EOSINOPHIL NFR BLD AUTO: 1.8 % — SIGNIFICANT CHANGE UP (ref 0–6)
HCT VFR BLD CALC: 31.4 % — LOW (ref 39–50)
HGB BLD-MCNC: 10.6 G/DL — LOW (ref 13–17)
LYMPHOCYTES # BLD AUTO: 1.4 K/UL — SIGNIFICANT CHANGE UP (ref 1–3.3)
LYMPHOCYTES # BLD AUTO: 16.8 % — SIGNIFICANT CHANGE UP (ref 13–44)
MCHC RBC-ENTMCNC: 31.5 PG — SIGNIFICANT CHANGE UP (ref 27–34)
MCHC RBC-ENTMCNC: 33.6 GM/DL — SIGNIFICANT CHANGE UP (ref 32–36)
MCV RBC AUTO: 93.8 FL — SIGNIFICANT CHANGE UP (ref 80–100)
MONOCYTES # BLD AUTO: 0.6 K/UL — SIGNIFICANT CHANGE UP (ref 0–0.9)
MONOCYTES NFR BLD AUTO: 7.4 % — SIGNIFICANT CHANGE UP (ref 2–14)
NEUTROPHILS # BLD AUTO: 6 K/UL — SIGNIFICANT CHANGE UP (ref 1.8–7.4)
NEUTROPHILS NFR BLD AUTO: 72.3 % — SIGNIFICANT CHANGE UP (ref 43–77)
PLATELET # BLD AUTO: 228 K/UL — SIGNIFICANT CHANGE UP (ref 150–400)
RBC # BLD: 3.35 M/UL — LOW (ref 4.2–5.8)
RBC # FLD: 13.3 % — SIGNIFICANT CHANGE UP (ref 10.3–14.5)
WBC # BLD: 8.3 K/UL — SIGNIFICANT CHANGE UP (ref 3.8–10.5)
WBC # FLD AUTO: 8.3 K/UL — SIGNIFICANT CHANGE UP (ref 3.8–10.5)

## 2018-08-31 ENCOUNTER — APPOINTMENT (OUTPATIENT)
Dept: NEPHROLOGY | Facility: CLINIC | Age: 83
End: 2018-08-31
Payer: MEDICARE

## 2018-08-31 VITALS — OXYGEN SATURATION: 95 % | HEIGHT: 65 IN | HEART RATE: 59 BPM | WEIGHT: 169 LBS | BODY MASS INDEX: 28.16 KG/M2

## 2018-08-31 VITALS — DIASTOLIC BLOOD PRESSURE: 60 MMHG | SYSTOLIC BLOOD PRESSURE: 162 MMHG

## 2018-08-31 DIAGNOSIS — D63.1 ANEMIA IN CHRONIC KIDNEY DISEASE: ICD-10-CM

## 2018-08-31 DIAGNOSIS — N18.5 CHRONIC KIDNEY DISEASE, STAGE 5: ICD-10-CM

## 2018-08-31 PROCEDURE — 99215 OFFICE O/P EST HI 40 MIN: CPT | Mod: 25

## 2018-08-31 PROCEDURE — 36415 COLL VENOUS BLD VENIPUNCTURE: CPT

## 2018-09-04 LAB
ALBUMIN SERPL ELPH-MCNC: 4.5 G/DL
ANION GAP SERPL CALC-SCNC: 18 MMOL/L
BUN SERPL-MCNC: 79 MG/DL
CALCIUM SERPL-MCNC: 9.8 MG/DL
CHLORIDE SERPL-SCNC: 104 MMOL/L
CO2 SERPL-SCNC: 21 MMOL/L
CREAT SERPL-MCNC: 4.32 MG/DL
GLUCOSE SERPL-MCNC: 97 MG/DL
PHOSPHATE SERPL-MCNC: 4.8 MG/DL
POTASSIUM SERPL-SCNC: 4.8 MMOL/L
SODIUM SERPL-SCNC: 143 MMOL/L

## 2018-09-06 ENCOUNTER — APPOINTMENT (OUTPATIENT)
Dept: HEMATOLOGY ONCOLOGY | Facility: CLINIC | Age: 83
End: 2018-09-06

## 2018-09-06 ENCOUNTER — RESULT REVIEW (OUTPATIENT)
Age: 83
End: 2018-09-06

## 2018-09-06 LAB
BASOPHILS # BLD AUTO: 0.2 K/UL — SIGNIFICANT CHANGE UP (ref 0–0.2)
BASOPHILS NFR BLD AUTO: 2 % — SIGNIFICANT CHANGE UP (ref 0–2)
EOSINOPHIL # BLD AUTO: 0.2 K/UL — SIGNIFICANT CHANGE UP (ref 0–0.5)
EOSINOPHIL NFR BLD AUTO: 2.4 % — SIGNIFICANT CHANGE UP (ref 0–6)
HCT VFR BLD CALC: 32 % — LOW (ref 39–50)
HGB BLD-MCNC: 10.9 G/DL — LOW (ref 13–17)
LYMPHOCYTES # BLD AUTO: 1.2 K/UL — SIGNIFICANT CHANGE UP (ref 1–3.3)
LYMPHOCYTES # BLD AUTO: 15.4 % — SIGNIFICANT CHANGE UP (ref 13–44)
MCHC RBC-ENTMCNC: 31.2 PG — SIGNIFICANT CHANGE UP (ref 27–34)
MCHC RBC-ENTMCNC: 34.1 GM/DL — SIGNIFICANT CHANGE UP (ref 32–36)
MCV RBC AUTO: 91.5 FL — SIGNIFICANT CHANGE UP (ref 80–100)
MONOCYTES # BLD AUTO: 0.6 K/UL — SIGNIFICANT CHANGE UP (ref 0–0.9)
MONOCYTES NFR BLD AUTO: 7.2 % — SIGNIFICANT CHANGE UP (ref 2–14)
NEUTROPHILS # BLD AUTO: 5.8 K/UL — SIGNIFICANT CHANGE UP (ref 1.8–7.4)
NEUTROPHILS NFR BLD AUTO: 73 % — SIGNIFICANT CHANGE UP (ref 43–77)
PLATELET # BLD AUTO: 247 K/UL — SIGNIFICANT CHANGE UP (ref 150–400)
RBC # BLD: 3.49 M/UL — LOW (ref 4.2–5.8)
RBC # FLD: 14.1 % — SIGNIFICANT CHANGE UP (ref 10.3–14.5)
WBC # BLD: 7.9 K/UL — SIGNIFICANT CHANGE UP (ref 3.8–10.5)
WBC # FLD AUTO: 7.9 K/UL — SIGNIFICANT CHANGE UP (ref 3.8–10.5)

## 2018-09-11 ENCOUNTER — APPOINTMENT (OUTPATIENT)
Dept: VASCULAR SURGERY | Facility: CLINIC | Age: 83
End: 2018-09-11
Payer: MEDICARE

## 2018-09-11 VITALS
TEMPERATURE: 98 F | OXYGEN SATURATION: 97 % | SYSTOLIC BLOOD PRESSURE: 161 MMHG | DIASTOLIC BLOOD PRESSURE: 58 MMHG | HEART RATE: 63 BPM | BODY MASS INDEX: 27.46 KG/M2 | WEIGHT: 165 LBS

## 2018-09-11 PROCEDURE — 99214 OFFICE O/P EST MOD 30 MIN: CPT

## 2018-09-11 PROCEDURE — 93880 EXTRACRANIAL BILAT STUDY: CPT

## 2018-09-13 ENCOUNTER — RESULT REVIEW (OUTPATIENT)
Age: 83
End: 2018-09-13

## 2018-09-13 ENCOUNTER — APPOINTMENT (OUTPATIENT)
Dept: HEMATOLOGY ONCOLOGY | Facility: CLINIC | Age: 83
End: 2018-09-13

## 2018-09-13 ENCOUNTER — APPOINTMENT (OUTPATIENT)
Dept: INFUSION THERAPY | Facility: CLINIC | Age: 83
End: 2018-09-13

## 2018-09-13 LAB
BASOPHILS # BLD AUTO: 0.1 K/UL — SIGNIFICANT CHANGE UP (ref 0–0.2)
BASOPHILS NFR BLD AUTO: 1.6 % — SIGNIFICANT CHANGE UP (ref 0–2)
EOSINOPHIL # BLD AUTO: 0.2 K/UL — SIGNIFICANT CHANGE UP (ref 0–0.5)
EOSINOPHIL NFR BLD AUTO: 2.6 % — SIGNIFICANT CHANGE UP (ref 0–6)
HCT VFR BLD CALC: 35.7 % — LOW (ref 39–50)
HGB BLD-MCNC: 11.5 G/DL — LOW (ref 13–17)
LYMPHOCYTES # BLD AUTO: 0.9 K/UL — LOW (ref 1–3.3)
LYMPHOCYTES # BLD AUTO: 13.8 % — SIGNIFICANT CHANGE UP (ref 13–44)
MCHC RBC-ENTMCNC: 30.4 PG — SIGNIFICANT CHANGE UP (ref 27–34)
MCHC RBC-ENTMCNC: 32.3 GM/DL — SIGNIFICANT CHANGE UP (ref 32–36)
MCV RBC AUTO: 94.2 FL — SIGNIFICANT CHANGE UP (ref 80–100)
MONOCYTES # BLD AUTO: 0.6 K/UL — SIGNIFICANT CHANGE UP (ref 0–0.9)
MONOCYTES NFR BLD AUTO: 8.5 % — SIGNIFICANT CHANGE UP (ref 2–14)
NEUTROPHILS # BLD AUTO: 5 K/UL — SIGNIFICANT CHANGE UP (ref 1.8–7.4)
NEUTROPHILS NFR BLD AUTO: 73.5 % — SIGNIFICANT CHANGE UP (ref 43–77)
PLATELET # BLD AUTO: 200 K/UL — SIGNIFICANT CHANGE UP (ref 150–400)
RBC # BLD: 3.79 M/UL — LOW (ref 4.2–5.8)
RBC # FLD: 14.4 % — SIGNIFICANT CHANGE UP (ref 10.3–14.5)
WBC # BLD: 6.8 K/UL — SIGNIFICANT CHANGE UP (ref 3.8–10.5)
WBC # FLD AUTO: 6.8 K/UL — SIGNIFICANT CHANGE UP (ref 3.8–10.5)

## 2018-09-20 ENCOUNTER — RESULT REVIEW (OUTPATIENT)
Age: 83
End: 2018-09-20

## 2018-09-20 ENCOUNTER — APPOINTMENT (OUTPATIENT)
Dept: HEMATOLOGY ONCOLOGY | Facility: CLINIC | Age: 83
End: 2018-09-20

## 2018-09-20 LAB
BASOPHILS # BLD AUTO: 0.1 K/UL — SIGNIFICANT CHANGE UP (ref 0–0.2)
BASOPHILS NFR BLD AUTO: 1.5 % — SIGNIFICANT CHANGE UP (ref 0–2)
EOSINOPHIL # BLD AUTO: 0.2 K/UL — SIGNIFICANT CHANGE UP (ref 0–0.5)
EOSINOPHIL NFR BLD AUTO: 1.9 % — SIGNIFICANT CHANGE UP (ref 0–6)
HCT VFR BLD CALC: 36 % — LOW (ref 39–50)
HGB BLD-MCNC: 11.7 G/DL — LOW (ref 13–17)
LYMPHOCYTES # BLD AUTO: 0.9 K/UL — LOW (ref 1–3.3)
LYMPHOCYTES # BLD AUTO: 12.1 % — LOW (ref 13–44)
MCHC RBC-ENTMCNC: 30.7 PG — SIGNIFICANT CHANGE UP (ref 27–34)
MCHC RBC-ENTMCNC: 32.5 GM/DL — SIGNIFICANT CHANGE UP (ref 32–36)
MCV RBC AUTO: 94.4 FL — SIGNIFICANT CHANGE UP (ref 80–100)
MONOCYTES # BLD AUTO: 0.5 K/UL — SIGNIFICANT CHANGE UP (ref 0–0.9)
MONOCYTES NFR BLD AUTO: 6.5 % — SIGNIFICANT CHANGE UP (ref 2–14)
NEUTROPHILS # BLD AUTO: 6.1 K/UL — SIGNIFICANT CHANGE UP (ref 1.8–7.4)
NEUTROPHILS NFR BLD AUTO: 78 % — HIGH (ref 43–77)
PLATELET # BLD AUTO: 201 K/UL — SIGNIFICANT CHANGE UP (ref 150–400)
RBC # BLD: 3.81 M/UL — LOW (ref 4.2–5.8)
RBC # FLD: 13.5 % — SIGNIFICANT CHANGE UP (ref 10.3–14.5)
WBC # BLD: 7.8 K/UL — SIGNIFICANT CHANGE UP (ref 3.8–10.5)
WBC # FLD AUTO: 7.8 K/UL — SIGNIFICANT CHANGE UP (ref 3.8–10.5)

## 2018-09-21 ENCOUNTER — RX RENEWAL (OUTPATIENT)
Age: 83
End: 2018-09-21

## 2018-09-26 ENCOUNTER — APPOINTMENT (OUTPATIENT)
Dept: INTERNAL MEDICINE | Facility: CLINIC | Age: 83
End: 2018-09-26
Payer: MEDICARE

## 2018-09-26 VITALS
BODY MASS INDEX: 26.66 KG/M2 | SYSTOLIC BLOOD PRESSURE: 170 MMHG | DIASTOLIC BLOOD PRESSURE: 80 MMHG | WEIGHT: 160 LBS | HEIGHT: 65 IN

## 2018-09-26 PROCEDURE — 90662 IIV NO PRSV INCREASED AG IM: CPT

## 2018-09-26 PROCEDURE — 99215 OFFICE O/P EST HI 40 MIN: CPT | Mod: 25

## 2018-09-26 PROCEDURE — G0008: CPT

## 2018-09-26 PROCEDURE — 96372 THER/PROPH/DIAG INJ SC/IM: CPT

## 2018-09-26 RX ORDER — ERYTHROPOIETIN 40000 [IU]/ML
40000 INJECTION, SOLUTION INTRAVENOUS; SUBCUTANEOUS
Qty: 6 | Refills: 1 | Status: DISCONTINUED | COMMUNITY
Start: 2018-08-10 | End: 2018-09-26

## 2018-09-26 NOTE — HISTORY OF PRESENT ILLNESS
[FreeTextEntry1] : F/U B/P [de-identified] : PT HERE FOR FOLLOW UP FEELS OK PT HAS BEEN ANEMIC AND WAS SEEING DR ALMANZA OF RENAL  NWO HEMATOLOGY IS FOLLOW ING DR STOVALL\юлия FEELS STRONGER

## 2018-09-26 NOTE — ASSESSMENT
[FreeTextEntry1] : PT HERE FOR FOLLOW UP FEELS OK NO MAJOR COMPLAINTS\par FEELS BETTER WITH HIGHER HEMOGLOBIN NO CP LEG EDEMA MINIMAL\par PT HAS AV FISTIAL BUT PT AND FAMILY DO NOT WANT TO RUSH INTO DIALYSIS AS LONG AS HE IS FEELING WELL WILL FOLLOW UP\par HIGH DOSE FLU SHOT GIVEN\par OVERALL STABLE

## 2018-09-26 NOTE — PHYSICAL EXAM

## 2018-10-01 ENCOUNTER — OUTPATIENT (OUTPATIENT)
Dept: OUTPATIENT SERVICES | Facility: HOSPITAL | Age: 83
LOS: 1 days | Discharge: HOME | End: 2018-10-01

## 2018-10-01 DIAGNOSIS — Z98.890 OTHER SPECIFIED POSTPROCEDURAL STATES: Chronic | ICD-10-CM

## 2018-10-01 DIAGNOSIS — Z98.62 PERIPHERAL VASCULAR ANGIOPLASTY STATUS: Chronic | ICD-10-CM

## 2018-10-01 DIAGNOSIS — I77.0 ARTERIOVENOUS FISTULA, ACQUIRED: Chronic | ICD-10-CM

## 2018-10-01 DIAGNOSIS — D63.1 ANEMIA IN CHRONIC KIDNEY DISEASE: ICD-10-CM

## 2018-10-04 ENCOUNTER — RESULT REVIEW (OUTPATIENT)
Age: 83
End: 2018-10-04

## 2018-10-04 ENCOUNTER — APPOINTMENT (OUTPATIENT)
Dept: HEMATOLOGY ONCOLOGY | Facility: CLINIC | Age: 83
End: 2018-10-04
Payer: MEDICARE

## 2018-10-04 ENCOUNTER — APPOINTMENT (OUTPATIENT)
Age: 83
End: 2018-10-04

## 2018-10-04 VITALS
WEIGHT: 160 LBS | BODY MASS INDEX: 26.66 KG/M2 | TEMPERATURE: 98.3 F | DIASTOLIC BLOOD PRESSURE: 66 MMHG | HEIGHT: 65 IN | SYSTOLIC BLOOD PRESSURE: 167 MMHG | HEART RATE: 70 BPM

## 2018-10-04 LAB
BASOPHILS # BLD AUTO: 0.2 K/UL — SIGNIFICANT CHANGE UP (ref 0–0.2)
BASOPHILS NFR BLD AUTO: 1.8 % — SIGNIFICANT CHANGE UP (ref 0–2)
EOSINOPHIL # BLD AUTO: 0.1 K/UL — SIGNIFICANT CHANGE UP (ref 0–0.5)
EOSINOPHIL NFR BLD AUTO: 1.5 % — SIGNIFICANT CHANGE UP (ref 0–6)
HCT VFR BLD CALC: 32 % — LOW (ref 39–50)
HGB BLD-MCNC: 10.6 G/DL — LOW (ref 13–17)
LYMPHOCYTES # BLD AUTO: 1.3 K/UL — SIGNIFICANT CHANGE UP (ref 1–3.3)
LYMPHOCYTES # BLD AUTO: 13.8 % — SIGNIFICANT CHANGE UP (ref 13–44)
MCHC RBC-ENTMCNC: 29.8 PG — SIGNIFICANT CHANGE UP (ref 27–34)
MCHC RBC-ENTMCNC: 33.1 GM/DL — SIGNIFICANT CHANGE UP (ref 32–36)
MCV RBC AUTO: 89.8 FL — SIGNIFICANT CHANGE UP (ref 80–100)
MONOCYTES # BLD AUTO: 0.7 K/UL — SIGNIFICANT CHANGE UP (ref 0–0.9)
MONOCYTES NFR BLD AUTO: 7.4 % — SIGNIFICANT CHANGE UP (ref 2–14)
NEUTROPHILS # BLD AUTO: 7.3 K/UL — SIGNIFICANT CHANGE UP (ref 1.8–7.4)
NEUTROPHILS NFR BLD AUTO: 75.5 % — SIGNIFICANT CHANGE UP (ref 43–77)
PLATELET # BLD AUTO: 259 K/UL — SIGNIFICANT CHANGE UP (ref 150–400)
RBC # BLD: 3.56 M/UL — LOW (ref 4.2–5.8)
RBC # FLD: 12.5 % — SIGNIFICANT CHANGE UP (ref 10.3–14.5)
WBC # BLD: 9.7 K/UL — SIGNIFICANT CHANGE UP (ref 3.8–10.5)
WBC # FLD AUTO: 9.7 K/UL — SIGNIFICANT CHANGE UP (ref 3.8–10.5)

## 2018-10-04 PROCEDURE — 99213 OFFICE O/P EST LOW 20 MIN: CPT

## 2018-10-05 DIAGNOSIS — N18.5 CHRONIC KIDNEY DISEASE, STAGE 5: ICD-10-CM

## 2018-10-05 LAB
ALBUMIN SERPL ELPH-MCNC: 4.1 G/DL
ALP BLD-CCNC: 97 U/L
ALT SERPL-CCNC: 27 U/L
ANION GAP SERPL CALC-SCNC: 16 MMOL/L
AST SERPL-CCNC: 18 U/L
BILIRUB SERPL-MCNC: 0.2 MG/DL
BUN SERPL-MCNC: 77 MG/DL
CALCIUM SERPL-MCNC: 9.7 MG/DL
CHLORIDE SERPL-SCNC: 106 MMOL/L
CO2 SERPL-SCNC: 23 MMOL/L
CREAT SERPL-MCNC: 3.68 MG/DL
GLUCOSE SERPL-MCNC: 60 MG/DL
POTASSIUM SERPL-SCNC: 4.6 MMOL/L
PROT SERPL-MCNC: 6.4 G/DL
SODIUM SERPL-SCNC: 145 MMOL/L

## 2018-10-10 ENCOUNTER — APPOINTMENT (OUTPATIENT)
Dept: NEPHROLOGY | Facility: CLINIC | Age: 83
End: 2018-10-10

## 2018-10-29 ENCOUNTER — OUTPATIENT (OUTPATIENT)
Dept: OUTPATIENT SERVICES | Facility: HOSPITAL | Age: 83
LOS: 1 days | Discharge: HOME | End: 2018-10-29

## 2018-10-29 DIAGNOSIS — D63.1 ANEMIA IN CHRONIC KIDNEY DISEASE: ICD-10-CM

## 2018-10-29 DIAGNOSIS — Z98.890 OTHER SPECIFIED POSTPROCEDURAL STATES: Chronic | ICD-10-CM

## 2018-10-29 DIAGNOSIS — I77.0 ARTERIOVENOUS FISTULA, ACQUIRED: Chronic | ICD-10-CM

## 2018-10-29 DIAGNOSIS — Z98.62 PERIPHERAL VASCULAR ANGIOPLASTY STATUS: Chronic | ICD-10-CM

## 2018-11-05 ENCOUNTER — APPOINTMENT (OUTPATIENT)
Age: 83
End: 2018-11-05

## 2018-11-05 ENCOUNTER — RESULT REVIEW (OUTPATIENT)
Age: 83
End: 2018-11-05

## 2018-11-05 LAB
BASOPHILS # BLD AUTO: 0.1 K/UL — SIGNIFICANT CHANGE UP (ref 0–0.2)
BASOPHILS NFR BLD AUTO: 1.6 % — SIGNIFICANT CHANGE UP (ref 0–2)
EOSINOPHIL # BLD AUTO: 0.2 K/UL — SIGNIFICANT CHANGE UP (ref 0–0.5)
EOSINOPHIL NFR BLD AUTO: 2.3 % — SIGNIFICANT CHANGE UP (ref 0–6)
HCT VFR BLD CALC: 28.5 % — LOW (ref 39–50)
HGB BLD-MCNC: 9.4 G/DL — LOW (ref 13–17)
LYMPHOCYTES # BLD AUTO: 1.2 K/UL — SIGNIFICANT CHANGE UP (ref 1–3.3)
LYMPHOCYTES # BLD AUTO: 14.2 % — SIGNIFICANT CHANGE UP (ref 13–44)
MCHC RBC-ENTMCNC: 29.6 PG — SIGNIFICANT CHANGE UP (ref 27–34)
MCHC RBC-ENTMCNC: 32.8 GM/DL — SIGNIFICANT CHANGE UP (ref 32–36)
MCV RBC AUTO: 90.2 FL — SIGNIFICANT CHANGE UP (ref 80–100)
MONOCYTES # BLD AUTO: 0.5 K/UL — SIGNIFICANT CHANGE UP (ref 0–0.9)
MONOCYTES NFR BLD AUTO: 6.5 % — SIGNIFICANT CHANGE UP (ref 2–14)
NEUTROPHILS # BLD AUTO: 6.3 K/UL — SIGNIFICANT CHANGE UP (ref 1.8–7.4)
NEUTROPHILS NFR BLD AUTO: 75.4 % — SIGNIFICANT CHANGE UP (ref 43–77)
PLATELET # BLD AUTO: 270 K/UL — SIGNIFICANT CHANGE UP (ref 150–400)
RBC # BLD: 3.16 M/UL — LOW (ref 4.2–5.8)
RBC # FLD: 13.5 % — SIGNIFICANT CHANGE UP (ref 10.3–14.5)
WBC # BLD: 8.3 K/UL — SIGNIFICANT CHANGE UP (ref 3.8–10.5)
WBC # FLD AUTO: 8.3 K/UL — SIGNIFICANT CHANGE UP (ref 3.8–10.5)

## 2018-11-07 DIAGNOSIS — N18.5 CHRONIC KIDNEY DISEASE, STAGE 5: ICD-10-CM

## 2018-11-09 ENCOUNTER — APPOINTMENT (OUTPATIENT)
Dept: NEPHROLOGY | Facility: CLINIC | Age: 83
End: 2018-11-09
Payer: MEDICARE

## 2018-11-09 VITALS
HEART RATE: 74 BPM | HEIGHT: 65 IN | SYSTOLIC BLOOD PRESSURE: 170 MMHG | WEIGHT: 165 LBS | OXYGEN SATURATION: 98 % | DIASTOLIC BLOOD PRESSURE: 78 MMHG | BODY MASS INDEX: 27.49 KG/M2

## 2018-11-09 PROCEDURE — 99215 OFFICE O/P EST HI 40 MIN: CPT | Mod: 25

## 2018-11-09 PROCEDURE — 36415 COLL VENOUS BLD VENIPUNCTURE: CPT

## 2018-11-09 RX ORDER — ERYTHROPOIETIN 20000 [IU]/ML
20000 INJECTION, SOLUTION INTRAVENOUS; SUBCUTANEOUS
Qty: 4 | Refills: 5 | Status: DISCONTINUED | COMMUNITY
Start: 2018-08-17 | End: 2018-11-09

## 2018-11-09 RX ORDER — FOLIC ACID/VIT B COMPLEX AND C 0.8 MG
TABLET ORAL
Refills: 0 | Status: DISCONTINUED | COMMUNITY
End: 2018-11-09

## 2018-11-09 RX ORDER — VIT BCOMP,C/FOLIC ACID/ZINC
TABLET ORAL DAILY
Qty: 90 | Refills: 1 | Status: DISCONTINUED | COMMUNITY
Start: 2017-02-14 | End: 2018-11-09

## 2018-11-10 LAB
ALBUMIN SERPL ELPH-MCNC: 4.2 G/DL
ANION GAP SERPL CALC-SCNC: 13 MMOL/L
BUN SERPL-MCNC: 65 MG/DL
CALCIUM SERPL-MCNC: 9.7 MG/DL
CHLORIDE SERPL-SCNC: 107 MMOL/L
CO2 SERPL-SCNC: 23 MMOL/L
CREAT SERPL-MCNC: 3.89 MG/DL
GLUCOSE SERPL-MCNC: 156 MG/DL
HBA1C MFR BLD HPLC: 5.5 %
PHOSPHATE SERPL-MCNC: 4.3 MG/DL
POTASSIUM SERPL-SCNC: 5 MMOL/L
SODIUM SERPL-SCNC: 143 MMOL/L

## 2018-11-13 ENCOUNTER — APPOINTMENT (OUTPATIENT)
Dept: INTERNAL MEDICINE | Facility: CLINIC | Age: 83
End: 2018-11-13

## 2018-11-20 ENCOUNTER — RX RENEWAL (OUTPATIENT)
Age: 83
End: 2018-11-20

## 2018-11-26 ENCOUNTER — OUTPATIENT (OUTPATIENT)
Dept: OUTPATIENT SERVICES | Facility: HOSPITAL | Age: 83
LOS: 1 days | Discharge: HOME | End: 2018-11-26

## 2018-11-26 DIAGNOSIS — Z98.890 OTHER SPECIFIED POSTPROCEDURAL STATES: Chronic | ICD-10-CM

## 2018-11-26 DIAGNOSIS — D63.1 ANEMIA IN CHRONIC KIDNEY DISEASE: ICD-10-CM

## 2018-11-26 DIAGNOSIS — I77.0 ARTERIOVENOUS FISTULA, ACQUIRED: Chronic | ICD-10-CM

## 2018-11-26 DIAGNOSIS — Z98.62 PERIPHERAL VASCULAR ANGIOPLASTY STATUS: Chronic | ICD-10-CM

## 2018-11-26 DIAGNOSIS — N18.5 CHRONIC KIDNEY DISEASE, STAGE 5: ICD-10-CM

## 2018-12-03 ENCOUNTER — APPOINTMENT (OUTPATIENT)
Dept: HEMATOLOGY ONCOLOGY | Facility: CLINIC | Age: 83
End: 2018-12-03
Payer: MEDICARE

## 2018-12-03 ENCOUNTER — RESULT REVIEW (OUTPATIENT)
Age: 83
End: 2018-12-03

## 2018-12-03 ENCOUNTER — APPOINTMENT (OUTPATIENT)
Age: 83
End: 2018-12-03

## 2018-12-03 VITALS
DIASTOLIC BLOOD PRESSURE: 54 MMHG | TEMPERATURE: 98.3 F | WEIGHT: 164.13 LBS | OXYGEN SATURATION: 99 % | HEART RATE: 72 BPM | SYSTOLIC BLOOD PRESSURE: 170 MMHG | BODY MASS INDEX: 27.35 KG/M2 | HEIGHT: 65 IN

## 2018-12-03 LAB
BASOPHILS # BLD AUTO: 0.1 K/UL — SIGNIFICANT CHANGE UP (ref 0–0.2)
BASOPHILS NFR BLD AUTO: 1.4 % — SIGNIFICANT CHANGE UP (ref 0–2)
EOSINOPHIL # BLD AUTO: 0.2 K/UL — SIGNIFICANT CHANGE UP (ref 0–0.5)
EOSINOPHIL NFR BLD AUTO: 2.1 % — SIGNIFICANT CHANGE UP (ref 0–6)
HCT VFR BLD CALC: 31.1 % — LOW (ref 39–50)
HGB BLD-MCNC: 10 G/DL — LOW (ref 13–17)
LYMPHOCYTES # BLD AUTO: 1.3 K/UL — SIGNIFICANT CHANGE UP (ref 1–3.3)
LYMPHOCYTES # BLD AUTO: 13.4 % — SIGNIFICANT CHANGE UP (ref 13–44)
MCHC RBC-ENTMCNC: 29.9 PG — SIGNIFICANT CHANGE UP (ref 27–34)
MCHC RBC-ENTMCNC: 32.2 GM/DL — SIGNIFICANT CHANGE UP (ref 32–36)
MCV RBC AUTO: 92.7 FL — SIGNIFICANT CHANGE UP (ref 80–100)
MONOCYTES # BLD AUTO: 0.7 K/UL — SIGNIFICANT CHANGE UP (ref 0–0.9)
MONOCYTES NFR BLD AUTO: 7.5 % — SIGNIFICANT CHANGE UP (ref 2–14)
NEUTROPHILS # BLD AUTO: 7.4 K/UL — SIGNIFICANT CHANGE UP (ref 1.8–7.4)
NEUTROPHILS NFR BLD AUTO: 75.6 % — SIGNIFICANT CHANGE UP (ref 43–77)
PLATELET # BLD AUTO: 273 K/UL — SIGNIFICANT CHANGE UP (ref 150–400)
RBC # BLD: 3.36 M/UL — LOW (ref 4.2–5.8)
RBC # FLD: 13.9 % — SIGNIFICANT CHANGE UP (ref 10.3–14.5)
WBC # BLD: 9.7 K/UL — SIGNIFICANT CHANGE UP (ref 3.8–10.5)
WBC # FLD AUTO: 9.7 K/UL — SIGNIFICANT CHANGE UP (ref 3.8–10.5)

## 2018-12-03 PROCEDURE — 99213 OFFICE O/P EST LOW 20 MIN: CPT

## 2018-12-03 NOTE — REVIEW OF SYSTEMS
[Fatigue] : fatigue [SOB on Exertion] : shortness of breath during exertion [Negative] : Allergic/Immunologic

## 2018-12-06 NOTE — ASSESSMENT
[FreeTextEntry1] : Mr. King is a 83 yo WM with Stage 5 renal disease with anemia, , currently getting Aranesp Q 4 weks, HGB today is 10 and will receive aranesp today.RTO in 1 month for aranesp AND 2 MONTHS FOR OV  and aranesp dependong on HGB/HCT.\par  Has appt with nephrology next week.

## 2018-12-06 NOTE — HISTORY OF PRESENT ILLNESS
[de-identified] : Mr. King is a 83 yo WM with a long history of renal, disease, , currently Stage 5, who has been treated conservatively, no dialysis, but who has renal related anema, has been on procrit, but HGb has been ranging between 8.6 - 11.  [de-identified] : Doing well o n aranesp, still has SOB on exertion ( stairs), being followed by cardiology and Nephrology.\par No evidence of bleeding, still on ferrous sulfate.

## 2018-12-14 ENCOUNTER — APPOINTMENT (OUTPATIENT)
Dept: NEPHROLOGY | Facility: CLINIC | Age: 83
End: 2018-12-14
Payer: MEDICARE

## 2018-12-14 VITALS
HEART RATE: 69 BPM | DIASTOLIC BLOOD PRESSURE: 60 MMHG | BODY MASS INDEX: 27.66 KG/M2 | SYSTOLIC BLOOD PRESSURE: 160 MMHG | OXYGEN SATURATION: 99 % | WEIGHT: 166 LBS | HEIGHT: 65 IN

## 2018-12-14 PROCEDURE — 36415 COLL VENOUS BLD VENIPUNCTURE: CPT

## 2018-12-14 PROCEDURE — 99215 OFFICE O/P EST HI 40 MIN: CPT | Mod: 25

## 2018-12-15 LAB
BASOPHILS # BLD AUTO: 0.07 K/UL
BASOPHILS NFR BLD AUTO: 1 %
EOSINOPHIL # BLD AUTO: 0.12 K/UL
EOSINOPHIL NFR BLD AUTO: 1.7 %
HCT VFR BLD CALC: 30.1 %
HGB BLD-MCNC: 9.1 G/DL
IMM GRANULOCYTES NFR BLD AUTO: 0.3 %
LYMPHOCYTES # BLD AUTO: 0.74 K/UL
LYMPHOCYTES NFR BLD AUTO: 10.3 %
MAN DIFF?: NORMAL
MCHC RBC-ENTMCNC: 29.1 PG
MCHC RBC-ENTMCNC: 30.2 GM/DL
MCV RBC AUTO: 96.2 FL
MONOCYTES # BLD AUTO: 0.5 K/UL
MONOCYTES NFR BLD AUTO: 6.9 %
NEUTROPHILS # BLD AUTO: 5.76 K/UL
NEUTROPHILS NFR BLD AUTO: 79.8 %
PLATELET # BLD AUTO: 249 K/UL
RBC # BLD: 3.13 M/UL
RBC # FLD: 18.2 %
WBC # FLD AUTO: 7.21 K/UL

## 2018-12-16 LAB
ALBUMIN SERPL ELPH-MCNC: 4.2 G/DL
ANION GAP SERPL CALC-SCNC: 15 MMOL/L
BUN SERPL-MCNC: 81 MG/DL
CALCIUM SERPL-MCNC: 8.8 MG/DL
CHLORIDE SERPL-SCNC: 111 MMOL/L
CO2 SERPL-SCNC: 17 MMOL/L
CREAT SERPL-MCNC: 3.71 MG/DL
GLUCOSE SERPL-MCNC: 146 MG/DL
PHOSPHATE SERPL-MCNC: 6.2 MG/DL
POTASSIUM SERPL-SCNC: 4.3 MMOL/L
SODIUM SERPL-SCNC: 143 MMOL/L

## 2018-12-18 ENCOUNTER — APPOINTMENT (OUTPATIENT)
Dept: VASCULAR SURGERY | Facility: CLINIC | Age: 83
End: 2018-12-18
Payer: MEDICARE

## 2018-12-18 ENCOUNTER — OUTPATIENT (OUTPATIENT)
Dept: OUTPATIENT SERVICES | Facility: HOSPITAL | Age: 83
LOS: 1 days | Discharge: HOME | End: 2018-12-18

## 2018-12-18 VITALS
RESPIRATION RATE: 20 BRPM | TEMPERATURE: 97.6 F | OXYGEN SATURATION: 98 % | DIASTOLIC BLOOD PRESSURE: 55 MMHG | BODY MASS INDEX: 27.82 KG/M2 | HEIGHT: 65 IN | HEART RATE: 67 BPM | SYSTOLIC BLOOD PRESSURE: 167 MMHG | WEIGHT: 167 LBS

## 2018-12-18 DIAGNOSIS — Z98.62 PERIPHERAL VASCULAR ANGIOPLASTY STATUS: Chronic | ICD-10-CM

## 2018-12-18 DIAGNOSIS — D63.1 ANEMIA IN CHRONIC KIDNEY DISEASE: ICD-10-CM

## 2018-12-18 DIAGNOSIS — Z98.890 OTHER SPECIFIED POSTPROCEDURAL STATES: Chronic | ICD-10-CM

## 2018-12-18 DIAGNOSIS — N18.5 CHRONIC KIDNEY DISEASE, STAGE 5: ICD-10-CM

## 2018-12-18 DIAGNOSIS — I77.0 ARTERIOVENOUS FISTULA, ACQUIRED: Chronic | ICD-10-CM

## 2018-12-18 PROCEDURE — 99213 OFFICE O/P EST LOW 20 MIN: CPT

## 2019-01-03 ENCOUNTER — RESULT REVIEW (OUTPATIENT)
Age: 84
End: 2019-01-03

## 2019-01-03 ENCOUNTER — APPOINTMENT (OUTPATIENT)
Dept: HEMATOLOGY ONCOLOGY | Facility: CLINIC | Age: 84
End: 2019-01-03

## 2019-01-03 ENCOUNTER — APPOINTMENT (OUTPATIENT)
Age: 84
End: 2019-01-03

## 2019-01-03 LAB
BASOPHILS # BLD AUTO: 0.1 K/UL — SIGNIFICANT CHANGE UP (ref 0–0.2)
BASOPHILS NFR BLD AUTO: 1.7 % — SIGNIFICANT CHANGE UP (ref 0–2)
EOSINOPHIL # BLD AUTO: 0.2 K/UL — SIGNIFICANT CHANGE UP (ref 0–0.5)
EOSINOPHIL NFR BLD AUTO: 2.6 % — SIGNIFICANT CHANGE UP (ref 0–6)
HCT VFR BLD CALC: 28.6 % — LOW (ref 39–50)
HGB BLD-MCNC: 9.4 G/DL — LOW (ref 13–17)
LYMPHOCYTES # BLD AUTO: 1.2 K/UL — SIGNIFICANT CHANGE UP (ref 1–3.3)
LYMPHOCYTES # BLD AUTO: 15.6 % — SIGNIFICANT CHANGE UP (ref 13–44)
MCHC RBC-ENTMCNC: 30.4 PG — SIGNIFICANT CHANGE UP (ref 27–34)
MCHC RBC-ENTMCNC: 32.7 GM/DL — SIGNIFICANT CHANGE UP (ref 32–36)
MCV RBC AUTO: 93.1 FL — SIGNIFICANT CHANGE UP (ref 80–100)
MONOCYTES # BLD AUTO: 0.7 K/UL — SIGNIFICANT CHANGE UP (ref 0–0.9)
MONOCYTES NFR BLD AUTO: 8.7 % — SIGNIFICANT CHANGE UP (ref 2–14)
NEUTROPHILS # BLD AUTO: 5.4 K/UL — SIGNIFICANT CHANGE UP (ref 1.8–7.4)
NEUTROPHILS NFR BLD AUTO: 71.4 % — SIGNIFICANT CHANGE UP (ref 43–77)
PLATELET # BLD AUTO: 247 K/UL — SIGNIFICANT CHANGE UP (ref 150–400)
RBC # BLD: 3.08 M/UL — LOW (ref 4.2–5.8)
RBC # FLD: 13.9 % — SIGNIFICANT CHANGE UP (ref 10.3–14.5)
WBC # BLD: 7.6 K/UL — SIGNIFICANT CHANGE UP (ref 3.8–10.5)
WBC # FLD AUTO: 7.6 K/UL — SIGNIFICANT CHANGE UP (ref 3.8–10.5)

## 2019-01-11 ENCOUNTER — RX RENEWAL (OUTPATIENT)
Age: 84
End: 2019-01-11

## 2019-01-16 ENCOUNTER — OUTPATIENT (OUTPATIENT)
Dept: OUTPATIENT SERVICES | Facility: HOSPITAL | Age: 84
LOS: 1 days | Discharge: HOME | End: 2019-01-16

## 2019-01-16 DIAGNOSIS — D63.1 ANEMIA IN CHRONIC KIDNEY DISEASE: ICD-10-CM

## 2019-01-16 DIAGNOSIS — I77.0 ARTERIOVENOUS FISTULA, ACQUIRED: Chronic | ICD-10-CM

## 2019-01-16 DIAGNOSIS — Z98.890 OTHER SPECIFIED POSTPROCEDURAL STATES: Chronic | ICD-10-CM

## 2019-01-16 DIAGNOSIS — N18.5 CHRONIC KIDNEY DISEASE, STAGE 5: ICD-10-CM

## 2019-01-16 DIAGNOSIS — Z98.62 PERIPHERAL VASCULAR ANGIOPLASTY STATUS: Chronic | ICD-10-CM

## 2019-01-22 ENCOUNTER — RESULT REVIEW (OUTPATIENT)
Age: 84
End: 2019-01-22

## 2019-01-22 ENCOUNTER — APPOINTMENT (OUTPATIENT)
Dept: HEMATOLOGY ONCOLOGY | Facility: CLINIC | Age: 84
End: 2019-01-22

## 2019-01-22 LAB
BASOPHILS # BLD AUTO: 0.1 K/UL — SIGNIFICANT CHANGE UP (ref 0–0.2)
BASOPHILS NFR BLD AUTO: 2 % — SIGNIFICANT CHANGE UP (ref 0–2)
EOSINOPHIL # BLD AUTO: 0.1 K/UL — SIGNIFICANT CHANGE UP (ref 0–0.5)
EOSINOPHIL NFR BLD AUTO: 2 % — SIGNIFICANT CHANGE UP (ref 0–6)
HCT VFR BLD CALC: 33.2 % — LOW (ref 39–50)
HGB BLD-MCNC: 10.2 G/DL — LOW (ref 13–17)
LYMPHOCYTES # BLD AUTO: 0.6 K/UL — LOW (ref 1–3.3)
LYMPHOCYTES # BLD AUTO: 9.2 % — LOW (ref 13–44)
MCHC RBC-ENTMCNC: 29.7 PG — SIGNIFICANT CHANGE UP (ref 27–34)
MCHC RBC-ENTMCNC: 30.8 GM/DL — LOW (ref 32–36)
MCV RBC AUTO: 96.3 FL — SIGNIFICANT CHANGE UP (ref 80–100)
MONOCYTES # BLD AUTO: 0.6 K/UL — SIGNIFICANT CHANGE UP (ref 0–0.9)
MONOCYTES NFR BLD AUTO: 8.4 % — SIGNIFICANT CHANGE UP (ref 2–14)
NEUTROPHILS # BLD AUTO: 5.4 K/UL — SIGNIFICANT CHANGE UP (ref 1.8–7.4)
NEUTROPHILS NFR BLD AUTO: 78.3 % — HIGH (ref 43–77)
PLATELET # BLD AUTO: 176 K/UL — SIGNIFICANT CHANGE UP (ref 150–400)
RBC # BLD: 3.45 M/UL — LOW (ref 4.2–5.8)
RBC # FLD: 15.2 % — HIGH (ref 10.3–14.5)
WBC # BLD: 6.9 K/UL — SIGNIFICANT CHANGE UP (ref 3.8–10.5)
WBC # FLD AUTO: 6.9 K/UL — SIGNIFICANT CHANGE UP (ref 3.8–10.5)

## 2019-01-25 ENCOUNTER — APPOINTMENT (OUTPATIENT)
Dept: NEPHROLOGY | Facility: CLINIC | Age: 84
End: 2019-01-25
Payer: MEDICARE

## 2019-01-25 VITALS
BODY MASS INDEX: 27.7 KG/M2 | WEIGHT: 166.25 LBS | HEART RATE: 65 BPM | OXYGEN SATURATION: 99 % | SYSTOLIC BLOOD PRESSURE: 140 MMHG | HEIGHT: 65 IN | DIASTOLIC BLOOD PRESSURE: 59 MMHG

## 2019-01-25 PROCEDURE — 99215 OFFICE O/P EST HI 40 MIN: CPT | Mod: 25

## 2019-01-25 PROCEDURE — 36415 COLL VENOUS BLD VENIPUNCTURE: CPT

## 2019-01-27 LAB
25(OH)D3 SERPL-MCNC: 22.5 NG/ML
ALBUMIN SERPL ELPH-MCNC: 4.2 G/DL
ANION GAP SERPL CALC-SCNC: 18 MMOL/L
APPEARANCE: ABNORMAL
BACTERIA: NEGATIVE
BILIRUBIN URINE: NEGATIVE
BLOOD URINE: NEGATIVE
BUN SERPL-MCNC: 89 MG/DL
CALCIUM SERPL-MCNC: 9.5 MG/DL
CALCIUM SERPL-MCNC: 9.5 MG/DL
CHLORIDE SERPL-SCNC: 104 MMOL/L
CO2 SERPL-SCNC: 17 MMOL/L
COLOR: YELLOW
CREAT SERPL-MCNC: 4.05 MG/DL
CREAT SPEC-SCNC: 118 MG/DL
CREAT/PROT UR: 2.9 RATIO
GLUCOSE QUALITATIVE U: 100 MG/DL
GLUCOSE SERPL-MCNC: 230 MG/DL
HYALINE CASTS: 11 /LPF
KETONES URINE: NEGATIVE
LEUKOCYTE ESTERASE URINE: NEGATIVE
MICROSCOPIC-UA: NORMAL
NITRITE URINE: NEGATIVE
PARATHYROID HORMONE INTACT: 101 PG/ML
PH URINE: 5
PHOSPHATE SERPL-MCNC: 5.1 MG/DL
POTASSIUM SERPL-SCNC: 5.3 MMOL/L
PROT UR-MCNC: 348 MG/DL
PROTEIN URINE: 300 MG/DL
RED BLOOD CELLS URINE: 1 /HPF
SODIUM SERPL-SCNC: 139 MMOL/L
SPECIFIC GRAVITY URINE: 1.02
SQUAMOUS EPITHELIAL CELLS: 2 /HPF
UROBILINOGEN URINE: NEGATIVE MG/DL
WHITE BLOOD CELLS URINE: 9 /HPF

## 2019-01-27 NOTE — ASSESSMENT
[FreeTextEntry1] : 85 YO  M - with moderate Bilateral carotid stenoses , right > Left  , DM - 2 , HTN . 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 edema left more so than right 2+ :\par \par On Low K+ Diet,\par \par Hospitalization for ADHF & now s/p AVF ( > 15 weeks ) Healthy Transition RN involved,\par \par On Nifedipine (  ) - Up Titrated,\par \par S/P R - Carotid Surgery in Oct. 12 - 2017,\par \par Cont., Torsemide 20 mg., Daily, Add Metolazone,\par \par Continue AMY & HD when Family is convinced of need,\par \par \par \par

## 2019-01-27 NOTE — HISTORY OF PRESENT ILLNESS
[___ Month(s) Ago] : [unfilled] month(s) ago [Adding Medication ___] : adding [unfilled] [Stopping Medication ___] : stopping [unfilled] [Ordering Test(s) ___] : ordering [unfilled] [None] : ~He/She~ has no significant interval events [Stage 4] : stage 4 [Diabetic Nephropathy] : diabetic nephropathy [Hypertensive Nephropathy] : hypertensive nephropathy [Nocturia] : nocturia [Edema] : edema [Renal Diet] : renal diet [Diabetes Management] : diabetes management [Vitamin D] : vitamin D [Renal Vitamins] : renal vitamins [Good Compliance] : good compliance  [Good Tolerance] : good tolerance  [Good Symptom Control] : good symptom control [FreeTextEntry1] : DM - 2 ~ 25 years, HX., of Cardiac arrhythmias ( V.Bigeminy )\par \par HTN X ~ 10 years ; No Decompensation of CHF > 3 years,\par \par R- Carotid atherosclerosis , with + Bruit ; S/P CEA,\par \par Compliant w. Diet , DM * HTN , S/P PC Transfusion - 2 units ,\par \par Declined early initiation of HD , AVF +, On Aranesp ( Hematology )

## 2019-01-27 NOTE — PHYSICAL EXAM

## 2019-01-27 NOTE — REASON FOR VISIT
[Follow-Up] : a follow-up visit [Family Member] : family member [FreeTextEntry1] : DMN, HTN, CKD - 4 A 3 , Cardiac arrhythmias & Carotid atherosclerosis ; Off Plavix  & On  [id number],

## 2019-01-31 ENCOUNTER — RESULT REVIEW (OUTPATIENT)
Age: 84
End: 2019-01-31

## 2019-01-31 ENCOUNTER — APPOINTMENT (OUTPATIENT)
Age: 84
End: 2019-01-31

## 2019-01-31 ENCOUNTER — APPOINTMENT (OUTPATIENT)
Dept: HEMATOLOGY ONCOLOGY | Facility: CLINIC | Age: 84
End: 2019-01-31

## 2019-01-31 ENCOUNTER — EMERGENCY (EMERGENCY)
Facility: HOSPITAL | Age: 84
LOS: 1 days | Discharge: HOME | End: 2019-01-31
Attending: EMERGENCY MEDICINE
Payer: MEDICARE

## 2019-01-31 VITALS
HEART RATE: 69 BPM | RESPIRATION RATE: 18 BRPM | OXYGEN SATURATION: 98 % | TEMPERATURE: 98 F | DIASTOLIC BLOOD PRESSURE: 63 MMHG | HEIGHT: 65 IN | WEIGHT: 156.97 LBS | SYSTOLIC BLOOD PRESSURE: 154 MMHG

## 2019-01-31 DIAGNOSIS — Z98.890 OTHER SPECIFIED POSTPROCEDURAL STATES: Chronic | ICD-10-CM

## 2019-01-31 DIAGNOSIS — Z98.62 PERIPHERAL VASCULAR ANGIOPLASTY STATUS: Chronic | ICD-10-CM

## 2019-01-31 DIAGNOSIS — I77.0 ARTERIOVENOUS FISTULA, ACQUIRED: Chronic | ICD-10-CM

## 2019-01-31 LAB
ALBUMIN SERPL ELPH-MCNC: 3.7 G/DL — SIGNIFICANT CHANGE UP (ref 3.3–5.2)
ALP SERPL-CCNC: 94 U/L — SIGNIFICANT CHANGE UP (ref 40–120)
ALT FLD-CCNC: 42 U/L — HIGH
ANION GAP SERPL CALC-SCNC: 15 MMOL/L — SIGNIFICANT CHANGE UP (ref 5–17)
AST SERPL-CCNC: 26 U/L — SIGNIFICANT CHANGE UP
BASOPHILS # BLD AUTO: 0.1 K/UL — SIGNIFICANT CHANGE UP (ref 0–0.2)
BASOPHILS # BLD AUTO: 0.2 K/UL — SIGNIFICANT CHANGE UP (ref 0–0.2)
BASOPHILS # BLD AUTO: 0.2 K/UL — SIGNIFICANT CHANGE UP (ref 0–0.2)
BASOPHILS NFR BLD AUTO: 1 % — SIGNIFICANT CHANGE UP (ref 0–2)
BASOPHILS NFR BLD AUTO: 1.8 % — SIGNIFICANT CHANGE UP (ref 0–2)
BASOPHILS NFR BLD AUTO: 1.8 % — SIGNIFICANT CHANGE UP (ref 0–2)
BILIRUB SERPL-MCNC: 0.2 MG/DL — LOW (ref 0.4–2)
BUN SERPL-MCNC: 121 MG/DL — HIGH (ref 8–20)
CALCIUM SERPL-MCNC: 9.4 MG/DL — SIGNIFICANT CHANGE UP (ref 8.6–10.2)
CHLORIDE SERPL-SCNC: 101 MMOL/L — SIGNIFICANT CHANGE UP (ref 98–107)
CO2 SERPL-SCNC: 19 MMOL/L — LOW (ref 22–29)
CREAT SERPL-MCNC: 4.58 MG/DL — HIGH (ref 0.5–1.3)
EOSINOPHIL # BLD AUTO: 0.1 K/UL — SIGNIFICANT CHANGE UP (ref 0–0.5)
EOSINOPHIL # BLD AUTO: 0.1 K/UL — SIGNIFICANT CHANGE UP (ref 0–0.5)
EOSINOPHIL # BLD AUTO: 0.2 K/UL — SIGNIFICANT CHANGE UP (ref 0–0.5)
EOSINOPHIL NFR BLD AUTO: 1.1 % — SIGNIFICANT CHANGE UP (ref 0–6)
EOSINOPHIL NFR BLD AUTO: 1.3 % — SIGNIFICANT CHANGE UP (ref 0–6)
EOSINOPHIL NFR BLD AUTO: 1.8 % — SIGNIFICANT CHANGE UP (ref 0–6)
GLUCOSE SERPL-MCNC: 226 MG/DL — HIGH (ref 70–115)
HCT VFR BLD CALC: 19.6 % — CRITICAL LOW (ref 39–50)
HCT VFR BLD CALC: 20 % — CRITICAL LOW (ref 42–52)
HCT VFR BLD CALC: 20.6 % — CRITICAL LOW (ref 39–50)
HGB BLD-MCNC: 6.2 G/DL — CRITICAL LOW (ref 13–17)
HGB BLD-MCNC: 6.3 G/DL — CRITICAL LOW (ref 14–18)
HGB BLD-MCNC: 6.5 G/DL — CRITICAL LOW (ref 13–17)
LYMPHOCYTES # BLD AUTO: 1 K/UL — SIGNIFICANT CHANGE UP (ref 1–3.3)
LYMPHOCYTES # BLD AUTO: 1 K/UL — SIGNIFICANT CHANGE UP (ref 1–4.8)
LYMPHOCYTES # BLD AUTO: 1.3 K/UL — SIGNIFICANT CHANGE UP (ref 1–3.3)
LYMPHOCYTES # BLD AUTO: 10.8 % — LOW (ref 13–44)
LYMPHOCYTES # BLD AUTO: 11.2 % — LOW (ref 20–55)
LYMPHOCYTES # BLD AUTO: 13.8 % — SIGNIFICANT CHANGE UP (ref 13–44)
MCHC RBC-ENTMCNC: 30 PG — SIGNIFICANT CHANGE UP (ref 27–31)
MCHC RBC-ENTMCNC: 30 PG — SIGNIFICANT CHANGE UP (ref 27–34)
MCHC RBC-ENTMCNC: 30.1 PG — SIGNIFICANT CHANGE UP (ref 27–34)
MCHC RBC-ENTMCNC: 31.4 G/DL — LOW (ref 32–36)
MCHC RBC-ENTMCNC: 31.5 G/DL — LOW (ref 32–36)
MCHC RBC-ENTMCNC: 31.7 G/DL — LOW (ref 32–36)
MCV RBC AUTO: 95 FL — SIGNIFICANT CHANGE UP (ref 80–100)
MCV RBC AUTO: 95.2 FL — HIGH (ref 80–94)
MCV RBC AUTO: 95.5 FL — SIGNIFICANT CHANGE UP (ref 80–100)
MONOCYTES # BLD AUTO: 0.5 K/UL — SIGNIFICANT CHANGE UP (ref 0–0.9)
MONOCYTES # BLD AUTO: 0.6 K/UL — SIGNIFICANT CHANGE UP (ref 0–0.8)
MONOCYTES # BLD AUTO: 0.6 K/UL — SIGNIFICANT CHANGE UP (ref 0–0.9)
MONOCYTES NFR BLD AUTO: 5.9 % — SIGNIFICANT CHANGE UP (ref 2–14)
MONOCYTES NFR BLD AUTO: 6.2 % — SIGNIFICANT CHANGE UP (ref 2–14)
MONOCYTES NFR BLD AUTO: 6.2 % — SIGNIFICANT CHANGE UP (ref 3–10)
NEUTROPHILS # BLD AUTO: 7.1 K/UL — SIGNIFICANT CHANGE UP (ref 1.8–7.4)
NEUTROPHILS # BLD AUTO: 7.2 K/UL — SIGNIFICANT CHANGE UP (ref 1.8–7.4)
NEUTROPHILS # BLD AUTO: 7.3 K/UL — SIGNIFICANT CHANGE UP (ref 1.8–8)
NEUTROPHILS NFR BLD AUTO: 76.6 % — SIGNIFICANT CHANGE UP (ref 43–77)
NEUTROPHILS NFR BLD AUTO: 79.9 % — HIGH (ref 43–77)
NEUTROPHILS NFR BLD AUTO: 80.1 % — HIGH (ref 37–73)
PLATELET # BLD AUTO: 225 K/UL — SIGNIFICANT CHANGE UP (ref 150–400)
PLATELET # BLD AUTO: 228 K/UL — SIGNIFICANT CHANGE UP (ref 150–400)
PLATELET # BLD AUTO: 261 K/UL — SIGNIFICANT CHANGE UP (ref 150–400)
POTASSIUM SERPL-MCNC: 4.2 MMOL/L — SIGNIFICANT CHANGE UP (ref 3.5–5.3)
POTASSIUM SERPL-SCNC: 4.2 MMOL/L — SIGNIFICANT CHANGE UP (ref 3.5–5.3)
PROT SERPL-MCNC: 6.7 G/DL — SIGNIFICANT CHANGE UP (ref 6.6–8.7)
RBC # BLD: 2.07 M/UL — LOW (ref 4.2–5.8)
RBC # BLD: 2.1 M/UL — LOW (ref 4.6–6.2)
RBC # BLD: 2.16 M/UL — LOW (ref 4.2–5.8)
RBC # FLD: 13.8 % — SIGNIFICANT CHANGE UP (ref 10.3–14.5)
RBC # FLD: 14 % — SIGNIFICANT CHANGE UP (ref 10.3–14.5)
RBC # FLD: 14.5 % — SIGNIFICANT CHANGE UP (ref 11–15.6)
SODIUM SERPL-SCNC: 135 MMOL/L — SIGNIFICANT CHANGE UP (ref 135–145)
TYPE + AB SCN PNL BLD: SIGNIFICANT CHANGE UP
WBC # BLD: 9 K/UL — SIGNIFICANT CHANGE UP (ref 3.8–10.5)
WBC # BLD: 9.1 K/UL — SIGNIFICANT CHANGE UP (ref 4.8–10.8)
WBC # BLD: 9.2 K/UL — SIGNIFICANT CHANGE UP (ref 3.8–10.5)
WBC # FLD AUTO: 9 K/UL — SIGNIFICANT CHANGE UP (ref 3.8–10.5)
WBC # FLD AUTO: 9.1 K/UL — SIGNIFICANT CHANGE UP (ref 4.8–10.8)
WBC # FLD AUTO: 9.2 K/UL — SIGNIFICANT CHANGE UP (ref 3.8–10.5)

## 2019-01-31 PROCEDURE — 86923 COMPATIBILITY TEST ELECTRIC: CPT

## 2019-01-31 PROCEDURE — 86900 BLOOD TYPING SEROLOGIC ABO: CPT

## 2019-01-31 PROCEDURE — 86850 RBC ANTIBODY SCREEN: CPT

## 2019-01-31 PROCEDURE — 36430 TRANSFUSION BLD/BLD COMPNT: CPT

## 2019-01-31 PROCEDURE — 99284 EMERGENCY DEPT VISIT MOD MDM: CPT

## 2019-01-31 PROCEDURE — 96374 THER/PROPH/DIAG INJ IV PUSH: CPT

## 2019-01-31 PROCEDURE — 80053 COMPREHEN METABOLIC PANEL: CPT

## 2019-01-31 PROCEDURE — 86901 BLOOD TYPING SEROLOGIC RH(D): CPT

## 2019-01-31 PROCEDURE — 99285 EMERGENCY DEPT VISIT HI MDM: CPT | Mod: 25

## 2019-01-31 PROCEDURE — 36415 COLL VENOUS BLD VENIPUNCTURE: CPT

## 2019-01-31 PROCEDURE — P9016: CPT

## 2019-01-31 PROCEDURE — 85027 COMPLETE CBC AUTOMATED: CPT

## 2019-01-31 RX ORDER — HYDRALAZINE HCL 50 MG
1 TABLET ORAL
Qty: 0 | Refills: 0 | COMMUNITY

## 2019-01-31 RX ORDER — HYDRALAZINE HCL 50 MG
100 TABLET ORAL ONCE
Qty: 0 | Refills: 0 | Status: COMPLETED | OUTPATIENT
Start: 2019-01-31 | End: 2019-01-31

## 2019-01-31 RX ORDER — FUROSEMIDE 40 MG
40 TABLET ORAL ONCE
Qty: 0 | Refills: 0 | Status: COMPLETED | OUTPATIENT
Start: 2019-01-31 | End: 2019-01-31

## 2019-01-31 RX ORDER — NIFEDIPINE 30 MG
60 TABLET, EXTENDED RELEASE 24 HR ORAL ONCE
Qty: 0 | Refills: 0 | Status: COMPLETED | OUTPATIENT
Start: 2019-01-31 | End: 2019-01-31

## 2019-01-31 RX ORDER — FLECAINIDE ACETATE 50 MG
100 TABLET ORAL ONCE
Qty: 0 | Refills: 0 | Status: COMPLETED | OUTPATIENT
Start: 2019-01-31 | End: 2019-01-31

## 2019-01-31 RX ADMIN — Medication 60 MILLIGRAM(S): at 20:47

## 2019-01-31 RX ADMIN — Medication 100 MILLIGRAM(S): at 20:47

## 2019-01-31 RX ADMIN — Medication 40 MILLIGRAM(S): at 21:33

## 2019-01-31 NOTE — ED ADULT NURSE NOTE - CHIEF COMPLAINT QUOTE
Pt ambulatory in ED, sent from [facility] - was getting his blood count to get his ariness injection. Pt was sent for low Hgb 6.5 and needs a blood transfusion per Dr. Baker. Denies any KHAN, dizziness or weakness. Denies any sob or difficulty breathing.

## 2019-01-31 NOTE — ED ADULT TRIAGE NOTE - CHIEF COMPLAINT QUOTE
Pt ambulatory in ED, sent from [facility] - was getting his blood count to get his ariness injection. Pt was sent for low Hgb 6.5 and needs a blood transfusion per Dr. Baker. Pt ambulatory in ED, sent from [facility] - was getting his blood count to get his ariness injection. Pt was sent for low Hgb 6.5 and needs a blood transfusion per Dr. Baker. Denies any KHAN, dizziness or weakness. Denies any sob or difficulty breathing.

## 2019-01-31 NOTE — ED ADULT NURSE NOTE - NSIMPLEMENTINTERV_GEN_ALL_ED
Implemented All Universal Safety Interventions:  [place] to call system. Call bell, personal items and telephone within reach. Instruct patient to call for assistance. Room bathroom lighting operational. Non-slip footwear when patient is off stretcher. Physically safe environment: no spills, clutter or unnecessary equipment. Stretcher in lowest position, wheels locked, appropriate side rails in place.

## 2019-01-31 NOTE — PROGRESS NOTE ADULT - SUBJECTIVE AND OBJECTIVE BOX
Vital Signs Last 24 Hrs,    T(C): 37.2 (31 Jan 2019 18:05), Max: 37.2 (31 Jan 2019 18:05)  T(F): 98.9 (31 Jan 2019 18:05), Max: 98.9 (31 Jan 2019 18:05)  HR: 68 (31 Jan 2019 18:05) (68 - 74)  BP: 157/74 (31 Jan 2019 18:05) (146/73 - 161/55)  BP(mean): --  RR: 18 (31 Jan 2019 18:05) (18 - 18)  SpO2: 97% (31 Jan 2019 18:05) (97% - 99%)    135    |  101    |  121.0<H>  ----------------------------<  226<H>  Ca:9.4   (31 Jan 2019 15:41)  4.2     |  19.0<L>  |  4.58<H>      eGFR if Non : 11 <L>  eGFR if : 13 <L>    TPro  6.7    /  Alb  3.7    /  TBili  0.2<L>  /  DBili  x      /  AST  26     /  ALT  42<H>  /  AlkPhos  94     31 Jan 2019 15:41                        6.3<LL>  9.1   )-----------( 261      ( 31 Jan 2019 15:41 )             20.0<LL>    D/W Dr. Monte,    Rec: Initiation of HD or Hospice care,    Epogen is ineffective w. Uremia,    PRBC Transfusion is a temporary measure,     Plan : per Family,

## 2019-01-31 NOTE — ED PROVIDER NOTE - MEDICAL DECISION MAKING DETAILS
labs and imaging reviewed. Pt with anemia and plan to xfuse 2 units and discharge with outpt f/up. Dr. Steele paged for renal awaiting return call.  Case d/w CDU PA and will put in CDU for observation. Pt with chronic anemia that has been worsening.  plan for xfusion and discharge.

## 2019-01-31 NOTE — ED PROVIDER NOTE - NS ED ROS FT
No fever/chills, No photophobia/eye pain/changes in vision, No ear pain/sore throat/dysphagia, No chest pain/palpitations, no SOB/cough/wheeze/stridor, No abdominal pain, No N/V/D, no dysuria/frequency/discharge, No neck/back pain, no rash, no changes in neurological status/function.     +fatigue.  +dizziness

## 2019-01-31 NOTE — ED PROVIDER NOTE - OBJECTIVE STATEMENT
Pt is an 83 yo M sent in for anemia needs blood xfusion.  PMHx significant for CA, htn, chf, CKD, dm.  Pt's daughter states that patient has been getting aranesp shots for his anemia. Pt went 10 d ago for a shot but was found to have an hgb of 10 so it was not given. over the past several days he has had more fatigue, pallor and dizziness.  He went to his pcp and had labs and was found to have an hgb of 6.2. Pt is known to have a chronic slow GI bleed for which they were told he would not be scoped. Pt on iron pills already. no other complaints.

## 2019-01-31 NOTE — ED PROVIDER NOTE - PROGRESS NOTE DETAILS
Case d/w Dr. Dennis who recommended patient gets dialysis. Pt's family still feels patient should not get dialysis.  Will transfuse 2 Units PRBC and discharge.  Pt signed out to Dr. Mcintosh pending xfusion and discharge. will give 40 mg lasix between xfusions. ecd sign out  patient and daughter refusing admission at this time, unableto convince, elevated bun and creatinine reviewed, refusing hd

## 2019-01-31 NOTE — ED PROVIDER NOTE - CARE PLAN
Principal Discharge DX:	Anemia Principal Discharge DX:	Anemia  Secondary Diagnosis:	CKD (chronic kidney disease)

## 2019-02-01 VITALS
SYSTOLIC BLOOD PRESSURE: 133 MMHG | DIASTOLIC BLOOD PRESSURE: 66 MMHG | HEART RATE: 70 BPM | RESPIRATION RATE: 19 BRPM | OXYGEN SATURATION: 98 % | TEMPERATURE: 98 F

## 2019-02-04 ENCOUNTER — APPOINTMENT (OUTPATIENT)
Dept: HEMATOLOGY ONCOLOGY | Facility: CLINIC | Age: 84
End: 2019-02-04
Payer: MEDICARE

## 2019-02-04 ENCOUNTER — RESULT REVIEW (OUTPATIENT)
Age: 84
End: 2019-02-04

## 2019-02-04 ENCOUNTER — EMERGENCY (EMERGENCY)
Facility: HOSPITAL | Age: 84
LOS: 1 days | Discharge: HOME | End: 2019-02-04
Attending: STUDENT IN AN ORGANIZED HEALTH CARE EDUCATION/TRAINING PROGRAM
Payer: MEDICARE

## 2019-02-04 VITALS
OXYGEN SATURATION: 99 % | DIASTOLIC BLOOD PRESSURE: 55 MMHG | SYSTOLIC BLOOD PRESSURE: 130 MMHG | HEIGHT: 65 IN | WEIGHT: 161 LBS | HEART RATE: 65 BPM | BODY MASS INDEX: 26.82 KG/M2 | TEMPERATURE: 97.7 F

## 2019-02-04 VITALS
TEMPERATURE: 98 F | RESPIRATION RATE: 18 BRPM | HEART RATE: 65 BPM | SYSTOLIC BLOOD PRESSURE: 157 MMHG | DIASTOLIC BLOOD PRESSURE: 61 MMHG

## 2019-02-04 VITALS
RESPIRATION RATE: 16 BRPM | HEART RATE: 61 BPM | HEIGHT: 65 IN | WEIGHT: 160.94 LBS | TEMPERATURE: 98 F | SYSTOLIC BLOOD PRESSURE: 138 MMHG | DIASTOLIC BLOOD PRESSURE: 64 MMHG | OXYGEN SATURATION: 99 %

## 2019-02-04 DIAGNOSIS — Z98.890 OTHER SPECIFIED POSTPROCEDURAL STATES: Chronic | ICD-10-CM

## 2019-02-04 DIAGNOSIS — Z98.62 PERIPHERAL VASCULAR ANGIOPLASTY STATUS: Chronic | ICD-10-CM

## 2019-02-04 DIAGNOSIS — I77.0 ARTERIOVENOUS FISTULA, ACQUIRED: Chronic | ICD-10-CM

## 2019-02-04 LAB
BASOPHILS # BLD AUTO: 0.2 K/UL — SIGNIFICANT CHANGE UP (ref 0–0.2)
BASOPHILS NFR BLD AUTO: 1.4 % — SIGNIFICANT CHANGE UP (ref 0–2)
EOSINOPHIL # BLD AUTO: 0.1 K/UL — SIGNIFICANT CHANGE UP (ref 0–0.5)
EOSINOPHIL NFR BLD AUTO: 0.9 % — SIGNIFICANT CHANGE UP (ref 0–6)
HCT VFR BLD CALC: 26.7 % — LOW (ref 39–50)
HGB BLD-MCNC: 8.7 G/DL — LOW (ref 13–17)
LYMPHOCYTES # BLD AUTO: 0.8 K/UL — LOW (ref 1–3.3)
LYMPHOCYTES # BLD AUTO: 7.2 % — LOW (ref 13–44)
MCHC RBC-ENTMCNC: 30.1 PG — SIGNIFICANT CHANGE UP (ref 27–34)
MCHC RBC-ENTMCNC: 32.4 G/DL — SIGNIFICANT CHANGE UP (ref 32–36)
MCV RBC AUTO: 92.9 FL — SIGNIFICANT CHANGE UP (ref 80–100)
MONOCYTES # BLD AUTO: 0.7 K/UL — SIGNIFICANT CHANGE UP (ref 0–0.9)
MONOCYTES NFR BLD AUTO: 6.3 % — SIGNIFICANT CHANGE UP (ref 2–14)
NEUTROPHILS # BLD AUTO: 9.2 K/UL — HIGH (ref 1.8–7.4)
NEUTROPHILS NFR BLD AUTO: 84.2 % — HIGH (ref 43–77)
PLATELET # BLD AUTO: 262 K/UL — SIGNIFICANT CHANGE UP (ref 150–400)
RBC # BLD: 2.88 M/UL — LOW (ref 4.2–5.8)
RBC # FLD: 14.8 % — HIGH (ref 10.3–14.5)
TYPE + AB SCN PNL BLD: SIGNIFICANT CHANGE UP
WBC # BLD: 10.9 K/UL — HIGH (ref 3.8–10.5)
WBC # FLD AUTO: 10.9 K/UL — HIGH (ref 3.8–10.5)

## 2019-02-04 PROCEDURE — 86923 COMPATIBILITY TEST ELECTRIC: CPT

## 2019-02-04 PROCEDURE — 36430 TRANSFUSION BLD/BLD COMPNT: CPT

## 2019-02-04 PROCEDURE — 99218: CPT

## 2019-02-04 PROCEDURE — 86900 BLOOD TYPING SEROLOGIC ABO: CPT

## 2019-02-04 PROCEDURE — P9016: CPT

## 2019-02-04 PROCEDURE — 99213 OFFICE O/P EST LOW 20 MIN: CPT

## 2019-02-04 PROCEDURE — 99285 EMERGENCY DEPT VISIT HI MDM: CPT | Mod: 25

## 2019-02-04 PROCEDURE — 36415 COLL VENOUS BLD VENIPUNCTURE: CPT

## 2019-02-04 PROCEDURE — 86901 BLOOD TYPING SEROLOGIC RH(D): CPT

## 2019-02-04 PROCEDURE — G0378: CPT

## 2019-02-04 PROCEDURE — 86850 RBC ANTIBODY SCREEN: CPT

## 2019-02-04 RX ORDER — HYDRALAZINE HCL 50 MG
100 TABLET ORAL ONCE
Qty: 0 | Refills: 0 | Status: COMPLETED | OUTPATIENT
Start: 2019-02-04 | End: 2019-02-04

## 2019-02-04 RX ORDER — HYDRALAZINE HCL 50 MG
100 TABLET ORAL ONCE
Qty: 0 | Refills: 0 | Status: DISCONTINUED | OUTPATIENT
Start: 2019-02-04 | End: 2019-02-04

## 2019-02-04 RX ADMIN — Medication 100 MILLIGRAM(S): at 15:47

## 2019-02-04 NOTE — ED CDU PROVIDER INITIAL DAY NOTE - OBJECTIVE STATEMENT
85 Y/o male Multiple medical problem sent form the  Primary care Dr office for the 1 unit blood transfusion . pt was in ER previously and have received a unit of blood. pt have done blood work at [facility] and done and HGB of 8.4. pt states he has occasional lightheadedness specially when he is reading , states this is his 3rd time that he is receiving blood transfusion. denies any other coming or concern. 85 Y/o male Multiple medical problem as below sent form the  Primary care Dr office for the 1 unit blood transfusion . pt was in ER previously and have received a unit of blood. pt have done blood work at [facility] and done and HGB of 8.4. pt states he has occasional lightheadedness specially when he is reading , states this is his 3rd time that he is receiving blood transfusion. admit some fatigue ,denies any other coming or concern.

## 2019-02-04 NOTE — ED STATDOCS - NS ED ROS FT
No fever/chills, No photophobia/eye pain/changes in vision, No ear pain/sore throat/dysphagia, No chest pain/palpitations, no SOB/cough/wheeze/stridor, No abdominal pain, No N/V/D, no dysuria/frequency/discharge, No neck/back pain, no rash, no changes in neurological status/function.

## 2019-02-04 NOTE — ED ADULT NURSE NOTE - OBJECTIVE STATEMENT
assumed care of pt @ 1330. pt a&ox3. pt presents to ED from primary physician d/t low Hgb. pt has no complaints at this time. pt appears to be in no distress at this time. denies headache, dizziness. pt c/o fatigued, but states that he doesn't sleep that well which is not new.

## 2019-02-04 NOTE — ED CDU PROVIDER INITIAL DAY NOTE - CONSTITUTIONAL, MLM
normal... Well appearing, well nourished, awake, alert, oriented to person, place, time/situation and in no apparent distress. pale looking NAD

## 2019-02-04 NOTE — ED CDU PROVIDER INITIAL DAY NOTE - MEDICAL DECISION MAKING DETAILS
85 Y/o male multiple medical problem present in ER ( sent form PCP Dr munson)  for 1 unit blood transfusion- D/w dr amaya   keep in obs - T&s - consent done - 1 unit RBC ordred - hydralazine afternoon med -

## 2019-02-04 NOTE — ED ADULT TRIAGE NOTE - CHIEF COMPLAINT QUOTE
Pt presents to ED from [facility] for blood transfusion. Hgb 8.5. Pt recently transfused with 2 units PRBC on Thursday night. Pt c/o mild dizziness. Sent for OBS admission, consent signed already. Unused fistula to left f/a.

## 2019-02-04 NOTE — ED ADULT NURSE REASSESSMENT NOTE - COMFORT CARE
darkened lights/po fluids offered/warm blanket provided/meal provided/wait time explained/ambulated to bathroom/plan of care explained

## 2019-02-04 NOTE — ED STATDOCS - MEDICAL DECISION MAKING DETAILS
labs from earlier today reviewed. Pt needs to be transfused one unit PRBCs.  will put in observation unit for xfusion and discharge.

## 2019-02-04 NOTE — ED CDU PROVIDER INITIAL DAY NOTE - ATTENDING CONTRIBUTION TO CARE
I performed a face to face history and physical exam of the patient and discussed their management with the resident/ACP. I reviewed the resident/ACP's note and agree with the documented findings and plan of care.    Pt with hx of symptomatic anemia just transfused 4 d ago but still persistently symptomatically anemic.  Pt sent in by oncologist for xfusion of 1 unit PRBCs.  will transfuse 1 unit PRBC and have patient f/up as an outpatient.

## 2019-02-04 NOTE — ED CDU PROVIDER INITIAL DAY NOTE - PROGRESS NOTE DETAILS
seen the pt at the bedside RBC is running and resting comfortably- wake the pt 's up . denies any pain or difficulty breathing or any concern - received the call from out nurse ramonita that the pt done with transfusion- w.o any reaction will keep in  obs for short while for if any reaction d/w dr amaya there is no need to repeat the cbc case d/w wit dr dorado will d/c pt

## 2019-02-04 NOTE — ED ADULT NURSE REASSESSMENT NOTE - NS ED NURSE REASSESS COMMENT FT1
pt tolerated one unit of PRBCs infused without any S/S of transfusion reaction.
verbal report rec'd from ED RN Arabella Pierre. assumed care of pt at this time. pt placed into CDU 8 for observation. no apparent distress
pt has one unit PRBCs infusing. pt in no apparent distress

## 2019-02-04 NOTE — ED ADULT NURSE NOTE - CAS EDN DISCHARGE ASSESSMENT
Symptoms improved/Dressing clean and dry/Patient baseline mental status/Alert and oriented to person, place and time/Awake

## 2019-02-04 NOTE — ED CDU PROVIDER DISPOSITION NOTE - ATTENDING CONTRIBUTION TO CARE
h/o chronic kidney disease, anemia with low hgb; pt to be transfused 1unit of prbcs; pt improved after transfusion; discharged with daughter to home

## 2019-02-04 NOTE — ED ADULT NURSE NOTE - CHIEF COMPLAINT QUOTE
Pt presents to ED from [facility] for blood transfusion. Hgb 8.5. Pt recently transfused with 2 units PRBC on Thursday night. Pt c/o mild dizziness. Sent for OBS admission, consent signed already. Unused fistula to left f/a.

## 2019-02-04 NOTE — ED STATDOCS - OBJECTIVE STATEMENT
Pt si an 85 yo M sent in for anemia needs xfusion.  PMHx significant for metastatic CA, CKD, htn, dm.  Pt was taken care of by myself 4 d and transfused 2 units of blood. Pt feeling better but still slightly fatigued. Pt went to his oncologist today and was sent to the ER for one more unit PRBCs.  no other complaints currently.

## 2019-02-04 NOTE — ED ADULT NURSE NOTE - NSIMPLEMENTINTERV_GEN_ALL_ED
Implemented All Universal Safety Interventions:  [place] to call system. Call bell, personal items and telephone within reach. Instruct patient to call for assistance. Room bathroom lighting operational. Non-slip footwear when patient is off stretcher. Physically safe environment: no spills, clutter or unnecessary equipment. Stretcher in lowest position, wheels locked, appropriate side rails in place.

## 2019-02-06 NOTE — ASSESSMENT
[FreeTextEntry1] : 85 yo WM with Renal anemia, on aranesp, transfused 2 units packed cells last week, when HGb dropped to 6.5, still fatigued. no evidence of bleeding. Dr. Baker in to see patient, will transfuse 1 more unit of packed cells and change Aranesp to Q 2 weeks . Will contact Dr. Avendano regarding if OK to use lactulose for constipation. RTO in 1 month

## 2019-02-06 NOTE — HISTORY OF PRESENT ILLNESS
[de-identified] : Mr. King is a 83 yo WM with a long history of renal, disease, , currently Stage 5, who has been treated conservatively, no dialysis, but who has renal related anema, has been on procrit, but HGb has been ranging between 8.6 - 11. Was switched to Aranesp\par  [de-identified] : HGB last week was 6.5 and he received 2 units of packed cells, no evidence of bleeding.He is still fatigued, hgb today up to8.7

## 2019-02-11 LAB
ANISOCYTOSIS BLD QL: SLIGHT — SIGNIFICANT CHANGE UP
HYPOCHROMIA BLD QL: SLIGHT — SIGNIFICANT CHANGE UP
MACROCYTES BLD QL: SLIGHT — SIGNIFICANT CHANGE UP
MICROCYTES BLD QL: SLIGHT — SIGNIFICANT CHANGE UP
PLAT MORPH BLD: NORMAL — SIGNIFICANT CHANGE UP
POIKILOCYTOSIS BLD QL AUTO: SLIGHT — SIGNIFICANT CHANGE UP
RBC BLD AUTO: SIGNIFICANT CHANGE UP

## 2019-02-14 ENCOUNTER — APPOINTMENT (OUTPATIENT)
Age: 84
End: 2019-02-14

## 2019-02-14 ENCOUNTER — RESULT REVIEW (OUTPATIENT)
Age: 84
End: 2019-02-14

## 2019-02-14 LAB
BASOPHILS # BLD AUTO: 0.2 K/UL — SIGNIFICANT CHANGE UP (ref 0–0.2)
BASOPHILS NFR BLD AUTO: 2.1 % — HIGH (ref 0–2)
EOSINOPHIL # BLD AUTO: 0.2 K/UL — SIGNIFICANT CHANGE UP (ref 0–0.5)
EOSINOPHIL NFR BLD AUTO: 2.4 % — SIGNIFICANT CHANGE UP (ref 0–6)
HCT VFR BLD CALC: 30.6 % — LOW (ref 39–50)
HGB BLD-MCNC: 9.6 G/DL — LOW (ref 13–17)
LYMPHOCYTES # BLD AUTO: 0.9 K/UL — LOW (ref 1–3.3)
LYMPHOCYTES # BLD AUTO: 11.7 % — LOW (ref 13–44)
MCHC RBC-ENTMCNC: 30.2 PG — SIGNIFICANT CHANGE UP (ref 27–34)
MCHC RBC-ENTMCNC: 31.3 G/DL — LOW (ref 32–36)
MCV RBC AUTO: 96.3 FL — SIGNIFICANT CHANGE UP (ref 80–100)
MONOCYTES # BLD AUTO: 0.6 K/UL — SIGNIFICANT CHANGE UP (ref 0–0.9)
MONOCYTES NFR BLD AUTO: 7.7 % — SIGNIFICANT CHANGE UP (ref 2–14)
NEUTROPHILS # BLD AUTO: 5.8 K/UL — SIGNIFICANT CHANGE UP (ref 1.8–7.4)
NEUTROPHILS NFR BLD AUTO: 76.2 % — SIGNIFICANT CHANGE UP (ref 43–77)
PLATELET # BLD AUTO: 220 K/UL — SIGNIFICANT CHANGE UP (ref 150–400)
RBC # BLD: 3.18 M/UL — LOW (ref 4.2–5.8)
RBC # FLD: 16.8 % — HIGH (ref 10.3–14.5)
WBC # BLD: 7.6 K/UL — SIGNIFICANT CHANGE UP (ref 3.8–10.5)
WBC # FLD AUTO: 7.6 K/UL — SIGNIFICANT CHANGE UP (ref 3.8–10.5)

## 2019-02-15 ENCOUNTER — APPOINTMENT (OUTPATIENT)
Dept: NEPHROLOGY | Facility: CLINIC | Age: 84
End: 2019-02-15
Payer: MEDICARE

## 2019-02-15 VITALS
OXYGEN SATURATION: 98 % | HEIGHT: 65 IN | DIASTOLIC BLOOD PRESSURE: 55 MMHG | WEIGHT: 163 LBS | BODY MASS INDEX: 27.16 KG/M2 | HEART RATE: 66 BPM | SYSTOLIC BLOOD PRESSURE: 139 MMHG

## 2019-02-15 DIAGNOSIS — E55.9 VITAMIN D DEFICIENCY, UNSPECIFIED: ICD-10-CM

## 2019-02-15 PROCEDURE — 99215 OFFICE O/P EST HI 40 MIN: CPT | Mod: 25

## 2019-02-15 PROCEDURE — 36415 COLL VENOUS BLD VENIPUNCTURE: CPT

## 2019-02-15 NOTE — ASSESSMENT
[FreeTextEntry1] : 85 YO  M - with moderate Bilateral carotid stenoses , right > Left  , DM - 2 , HTN . 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 edema left more so than right 2+ :\par \par On Low K+ Diet,\par \par Hospitalization for ADHF & now s/p AVF ( > 15 weeks ) Healthy Transition RN involved,\par \par On Nifedipine (  ) - Up Titrated,\par \par S/P R - Carotid Surgery in Oct. 12 - 2017,\par \par Cont., Torsemide 20 mg.,/ 40 mg., Alternatively ,  PRN  Metolazone,\par \par Continue AMY & HD when Family is convinced of need,\par \par \par \par

## 2019-02-15 NOTE — PHYSICAL EXAM

## 2019-02-15 NOTE — REASON FOR VISIT
[Follow-Up] : a follow-up visit [Family Member] : family member [FreeTextEntry1] : DMN, HTN, CKD - 4 b , Cardiac arrhythmias & Carotid atherosclerosis ; Off Plavix  & On  [id number],

## 2019-02-18 LAB
ALBUMIN SERPL ELPH-MCNC: 4.3 G/DL
ANION GAP SERPL CALC-SCNC: 15 MMOL/L
BUN SERPL-MCNC: 87 MG/DL
CALCIUM SERPL-MCNC: 9.4 MG/DL
CHLORIDE SERPL-SCNC: 106 MMOL/L
CO2 SERPL-SCNC: 17 MMOL/L
CREAT SERPL-MCNC: 4.42 MG/DL
GLUCOSE SERPL-MCNC: 106 MG/DL
PHOSPHATE SERPL-MCNC: 6.8 MG/DL
POTASSIUM SERPL-SCNC: 4.4 MMOL/L
SODIUM SERPL-SCNC: 138 MMOL/L

## 2019-02-19 ENCOUNTER — OUTPATIENT (OUTPATIENT)
Dept: OUTPATIENT SERVICES | Facility: HOSPITAL | Age: 84
LOS: 1 days | Discharge: HOME | End: 2019-02-19

## 2019-02-19 DIAGNOSIS — N18.5 CHRONIC KIDNEY DISEASE, STAGE 5: ICD-10-CM

## 2019-02-19 DIAGNOSIS — Z98.890 OTHER SPECIFIED POSTPROCEDURAL STATES: Chronic | ICD-10-CM

## 2019-02-19 DIAGNOSIS — I77.0 ARTERIOVENOUS FISTULA, ACQUIRED: Chronic | ICD-10-CM

## 2019-02-19 DIAGNOSIS — D63.1 ANEMIA IN CHRONIC KIDNEY DISEASE: ICD-10-CM

## 2019-02-19 DIAGNOSIS — Z98.62 PERIPHERAL VASCULAR ANGIOPLASTY STATUS: Chronic | ICD-10-CM

## 2019-02-25 ENCOUNTER — APPOINTMENT (OUTPATIENT)
Dept: NEPHROLOGY | Facility: CLINIC | Age: 84
End: 2019-02-25

## 2019-02-28 ENCOUNTER — RESULT REVIEW (OUTPATIENT)
Age: 84
End: 2019-02-28

## 2019-02-28 ENCOUNTER — APPOINTMENT (OUTPATIENT)
Age: 84
End: 2019-02-28

## 2019-02-28 LAB
BASOPHILS # BLD AUTO: 0.2 K/UL — SIGNIFICANT CHANGE UP (ref 0–0.2)
BASOPHILS NFR BLD AUTO: 2.2 % — HIGH (ref 0–2)
EOSINOPHIL # BLD AUTO: 0.3 K/UL — SIGNIFICANT CHANGE UP (ref 0–0.5)
EOSINOPHIL NFR BLD AUTO: 3.5 % — SIGNIFICANT CHANGE UP (ref 0–6)
HCT VFR BLD CALC: 39.3 % — SIGNIFICANT CHANGE UP (ref 39–50)
HGB BLD-MCNC: 12.2 G/DL — LOW (ref 13–17)
LYMPHOCYTES # BLD AUTO: 1.1 K/UL — SIGNIFICANT CHANGE UP (ref 1–3.3)
LYMPHOCYTES # BLD AUTO: 12.8 % — LOW (ref 13–44)
MCHC RBC-ENTMCNC: 29.8 PG — SIGNIFICANT CHANGE UP (ref 27–34)
MCHC RBC-ENTMCNC: 31.1 G/DL — LOW (ref 32–36)
MCV RBC AUTO: 95.7 FL — SIGNIFICANT CHANGE UP (ref 80–100)
MONOCYTES # BLD AUTO: 0.8 K/UL — SIGNIFICANT CHANGE UP (ref 0–0.9)
MONOCYTES NFR BLD AUTO: 9.6 % — SIGNIFICANT CHANGE UP (ref 2–14)
NEUTROPHILS # BLD AUTO: 6.2 K/UL — SIGNIFICANT CHANGE UP (ref 1.8–7.4)
NEUTROPHILS NFR BLD AUTO: 71.8 % — SIGNIFICANT CHANGE UP (ref 43–77)
PLATELET # BLD AUTO: 218 K/UL — SIGNIFICANT CHANGE UP (ref 150–400)
RBC # BLD: 4.11 M/UL — LOW (ref 4.2–5.8)
RBC # FLD: 15 % — HIGH (ref 10.3–14.5)
WBC # BLD: 8.7 K/UL — SIGNIFICANT CHANGE UP (ref 3.8–10.5)
WBC # FLD AUTO: 8.7 K/UL — SIGNIFICANT CHANGE UP (ref 3.8–10.5)

## 2019-03-04 ENCOUNTER — RESULT REVIEW (OUTPATIENT)
Age: 84
End: 2019-03-04

## 2019-03-04 ENCOUNTER — APPOINTMENT (OUTPATIENT)
Dept: HEMATOLOGY ONCOLOGY | Facility: CLINIC | Age: 84
End: 2019-03-04
Payer: MEDICARE

## 2019-03-04 VITALS
OXYGEN SATURATION: 98 % | WEIGHT: 162 LBS | SYSTOLIC BLOOD PRESSURE: 156 MMHG | HEART RATE: 64 BPM | TEMPERATURE: 97.9 F | DIASTOLIC BLOOD PRESSURE: 55 MMHG | BODY MASS INDEX: 26.99 KG/M2 | HEIGHT: 65 IN

## 2019-03-04 LAB
BASOPHILS # BLD AUTO: 0.2 K/UL — SIGNIFICANT CHANGE UP (ref 0–0.2)
BASOPHILS NFR BLD AUTO: 2.1 % — HIGH (ref 0–2)
EOSINOPHIL # BLD AUTO: 0.3 K/UL — SIGNIFICANT CHANGE UP (ref 0–0.5)
EOSINOPHIL NFR BLD AUTO: 4.7 % — SIGNIFICANT CHANGE UP (ref 0–6)
HCT VFR BLD CALC: 35.5 % — LOW (ref 39–50)
HGB BLD-MCNC: 11.5 G/DL — LOW (ref 13–17)
LYMPHOCYTES # BLD AUTO: 0.9 K/UL — LOW (ref 1–3.3)
LYMPHOCYTES # BLD AUTO: 12.2 % — LOW (ref 13–44)
MCHC RBC-ENTMCNC: 30.8 PG — SIGNIFICANT CHANGE UP (ref 27–34)
MCHC RBC-ENTMCNC: 32.2 G/DL — SIGNIFICANT CHANGE UP (ref 32–36)
MCV RBC AUTO: 95.4 FL — SIGNIFICANT CHANGE UP (ref 80–100)
MONOCYTES # BLD AUTO: 0.6 K/UL — SIGNIFICANT CHANGE UP (ref 0–0.9)
MONOCYTES NFR BLD AUTO: 8.1 % — SIGNIFICANT CHANGE UP (ref 2–14)
NEUTROPHILS # BLD AUTO: 5.1 K/UL — SIGNIFICANT CHANGE UP (ref 1.8–7.4)
NEUTROPHILS NFR BLD AUTO: 72.8 % — SIGNIFICANT CHANGE UP (ref 43–77)
PLATELET # BLD AUTO: 210 K/UL — SIGNIFICANT CHANGE UP (ref 150–400)
RBC # BLD: 3.73 M/UL — LOW (ref 4.2–5.8)
RBC # FLD: 14.3 % — SIGNIFICANT CHANGE UP (ref 10.3–14.5)
WBC # BLD: 7.1 K/UL — SIGNIFICANT CHANGE UP (ref 3.8–10.5)
WBC # FLD AUTO: 7.1 K/UL — SIGNIFICANT CHANGE UP (ref 3.8–10.5)

## 2019-03-04 PROCEDURE — 99213 OFFICE O/P EST LOW 20 MIN: CPT

## 2019-03-08 NOTE — HISTORY OF PRESENT ILLNESS
[de-identified] : Mr. King is a 83 yo WM with a long history of renal, disease, , currently Stage 5, who has been treated conservatively, no dialysis, but who has renal related anema, has been on procrit, but HGb has been ranging between 8.6 - 11. Was switched to Arane [de-identified] : Aranesp was changed to every other week, Last transfusion was early feb. 2019.\par he feels well, no chest pain or SOB, no evidence of bleeding.

## 2019-03-08 NOTE — ASSESSMENT
[FreeTextEntry1] : 83 yo WM with Renal anemia, on aranesp,, last transfusion was early February, Hgb has risen to 12 gms, 2 weeks ago, Today HGb is 11.5. treatment parameters say to hold Aranesp for HGb >11. So no Aranesp today, will return next week , if Hg <11 to resume Aranesp.

## 2019-03-14 ENCOUNTER — APPOINTMENT (OUTPATIENT)
Age: 84
End: 2019-03-14

## 2019-03-14 ENCOUNTER — RESULT REVIEW (OUTPATIENT)
Age: 84
End: 2019-03-14

## 2019-03-14 LAB
BASOPHILS # BLD AUTO: 0.1 K/UL — SIGNIFICANT CHANGE UP (ref 0–0.2)
BASOPHILS NFR BLD AUTO: 1.6 % — SIGNIFICANT CHANGE UP (ref 0–2)
EOSINOPHIL # BLD AUTO: 0.3 K/UL — SIGNIFICANT CHANGE UP (ref 0–0.5)
EOSINOPHIL NFR BLD AUTO: 3.8 % — SIGNIFICANT CHANGE UP (ref 0–6)
HCT VFR BLD CALC: 32.2 % — LOW (ref 39–50)
HGB BLD-MCNC: 10 G/DL — LOW (ref 13–17)
LYMPHOCYTES # BLD AUTO: 1 K/UL — SIGNIFICANT CHANGE UP (ref 1–3.3)
LYMPHOCYTES # BLD AUTO: 11.5 % — LOW (ref 13–44)
MCHC RBC-ENTMCNC: 29.6 PG — SIGNIFICANT CHANGE UP (ref 27–34)
MCHC RBC-ENTMCNC: 31.2 G/DL — LOW (ref 32–36)
MCV RBC AUTO: 94.7 FL — SIGNIFICANT CHANGE UP (ref 80–100)
MONOCYTES # BLD AUTO: 0.6 K/UL — SIGNIFICANT CHANGE UP (ref 0–0.9)
MONOCYTES NFR BLD AUTO: 6.5 % — SIGNIFICANT CHANGE UP (ref 2–14)
NEUTROPHILS # BLD AUTO: 6.6 K/UL — SIGNIFICANT CHANGE UP (ref 1.8–7.4)
NEUTROPHILS NFR BLD AUTO: 76.5 % — SIGNIFICANT CHANGE UP (ref 43–77)
PLATELET # BLD AUTO: 241 K/UL — SIGNIFICANT CHANGE UP (ref 150–400)
RBC # BLD: 3.4 M/UL — LOW (ref 4.2–5.8)
RBC # FLD: 13.3 % — SIGNIFICANT CHANGE UP (ref 10.3–14.5)
WBC # BLD: 8.7 K/UL — SIGNIFICANT CHANGE UP (ref 3.8–10.5)
WBC # FLD AUTO: 8.7 K/UL — SIGNIFICANT CHANGE UP (ref 3.8–10.5)

## 2019-03-19 ENCOUNTER — APPOINTMENT (OUTPATIENT)
Dept: VASCULAR SURGERY | Facility: CLINIC | Age: 84
End: 2019-03-19
Payer: MEDICARE

## 2019-03-19 VITALS
OXYGEN SATURATION: 99 % | BODY MASS INDEX: 26.99 KG/M2 | TEMPERATURE: 98.4 F | HEART RATE: 63 BPM | HEIGHT: 65 IN | DIASTOLIC BLOOD PRESSURE: 54 MMHG | WEIGHT: 162 LBS | SYSTOLIC BLOOD PRESSURE: 149 MMHG | RESPIRATION RATE: 16 BRPM

## 2019-03-19 PROCEDURE — 99214 OFFICE O/P EST MOD 30 MIN: CPT

## 2019-03-19 PROCEDURE — 93880 EXTRACRANIAL BILAT STUDY: CPT

## 2019-03-19 RX ORDER — MULTIVIT-MIN/FOLIC/VIT K/LYCOP 400-300MCG
TABLET ORAL
Refills: 0 | Status: DISCONTINUED | COMMUNITY
End: 2019-03-19

## 2019-03-19 RX ORDER — SEVELAMER CARBONATE 800 MG/1
800 TABLET, FILM COATED ORAL
Qty: 270 | Refills: 1 | Status: DISCONTINUED | COMMUNITY
Start: 2018-12-17 | End: 2019-03-19

## 2019-03-19 RX ORDER — ASCORBIC ACID, THIAMINE, RIBOFLAVIN, NIACINAMIDE, PYRIDOXINE, FOLIC ACID, COBALAMIN, BIOTIN, PANTOTHENIC ACID 100; 1.5; 1.7; 20; 10; 1; 6; 300; 1 MG/1; MG/1; MG/1; MG/1; MG/1; MG/1; UG/1; UG/1; MG/1
TABLET, COATED ORAL
Refills: 0 | Status: DISCONTINUED | COMMUNITY
End: 2019-03-19

## 2019-03-19 NOTE — HISTORY OF PRESENT ILLNESS
[FreeTextEntry1] : 85 y/o male s/p R CEA 10/10/17 and S/P LUE AVF 5/4/18. He continues to feel well. No reports of dizziness, lightheadedness, headaches, blurry vision, amaurosis fugax, no neurological or memory complaints. He continues to see Dr Avendano for CKD V not on HD. He has been maintaining status with diet and medication. He has intermittent anemia for which he gets injections. No reports or weakness, pain or tingling to his left hand.

## 2019-03-19 NOTE — ASSESSMENT
[Carotid Endarterectomy] : carotid endarterectomy [FreeTextEntry1] : 85 y/o male s/p R CEA 10/10/17 and S/P LUE AVF 5/4/18. He refers no complaints.U/S demonstrates a patent R CEA and moderate left carotid stenosis. He will continue with medical management of cholesterol, CKD and anemia. RTC in 3 months

## 2019-03-19 NOTE — PROCEDURE
[FreeTextEntry1] : U/S Left Hemodialysis Access site reveals no stenosis\par \par U/S Carotid Duplex 9/11/18 demonstrates a patent R CEA without stenosis. Left - fibrocalcific plaque in carotid bulb and P ICA without significant stenosis. Vertebral flow cephalad and flow velocities WNL b/l.\par \par U/S Carotid Duplex 3/19/19 demonstrates a patent R CEA without stenosis. Left - fibrocalcific plaque in carotid bulb and P ICA without significant stenosis. Vertebral flow cephalad and flow velocities WNL b/l.

## 2019-03-19 NOTE — PHYSICAL EXAM
[2+] : left 2+ [Oriented to Place] : oriented to place [Oriented to Person] : oriented to person [Alert] : alert [Oriented to Time] : oriented to time [Calm] : calm [Right Carotid Bruit] : no bruit heard over the right carotid [Left Carotid Bruit] : no bruit heard over the left carotid [de-identified] : WD, WN, NAD. Awake, alert, interactive. Ambulates without difficulty [de-identified] : supple [de-identified] : ELIJAH, PERPIEROL [FreeTextEntry1] : Good L AVF thrill. [de-identified] : no cyanosis or deformity. full ROM, MS 5/5\par  [de-identified] : MECHELLE

## 2019-03-19 NOTE — REASON FOR VISIT
[Follow-Up: _____] : a [unfilled] follow-up visit [FreeTextEntry1] : S/P revision of L AVF 4/18 and Hx of Carotid Stenosis.

## 2019-03-22 ENCOUNTER — OUTPATIENT (OUTPATIENT)
Dept: OUTPATIENT SERVICES | Facility: HOSPITAL | Age: 84
LOS: 1 days | Discharge: HOME | End: 2019-03-22

## 2019-03-22 DIAGNOSIS — I77.0 ARTERIOVENOUS FISTULA, ACQUIRED: Chronic | ICD-10-CM

## 2019-03-22 DIAGNOSIS — Z98.62 PERIPHERAL VASCULAR ANGIOPLASTY STATUS: Chronic | ICD-10-CM

## 2019-03-22 DIAGNOSIS — Z98.890 OTHER SPECIFIED POSTPROCEDURAL STATES: Chronic | ICD-10-CM

## 2019-03-22 DIAGNOSIS — N18.5 CHRONIC KIDNEY DISEASE, STAGE 5: ICD-10-CM

## 2019-03-22 DIAGNOSIS — D63.1 ANEMIA IN CHRONIC KIDNEY DISEASE: ICD-10-CM

## 2019-03-28 ENCOUNTER — APPOINTMENT (OUTPATIENT)
Age: 84
End: 2019-03-28

## 2019-03-28 ENCOUNTER — RESULT REVIEW (OUTPATIENT)
Age: 84
End: 2019-03-28

## 2019-03-28 ENCOUNTER — APPOINTMENT (OUTPATIENT)
Dept: HEMATOLOGY ONCOLOGY | Facility: CLINIC | Age: 84
End: 2019-03-28
Payer: MEDICARE

## 2019-03-28 VITALS
SYSTOLIC BLOOD PRESSURE: 154 MMHG | TEMPERATURE: 97.7 F | HEIGHT: 65 IN | DIASTOLIC BLOOD PRESSURE: 67 MMHG | HEART RATE: 63 BPM | WEIGHT: 162.03 LBS | OXYGEN SATURATION: 97 % | BODY MASS INDEX: 27 KG/M2

## 2019-03-28 LAB
BASOPHILS # BLD AUTO: 0.1 K/UL — SIGNIFICANT CHANGE UP (ref 0–0.2)
BASOPHILS NFR BLD AUTO: 1.5 % — SIGNIFICANT CHANGE UP (ref 0–2)
EOSINOPHIL # BLD AUTO: 0.5 K/UL — SIGNIFICANT CHANGE UP (ref 0–0.5)
EOSINOPHIL NFR BLD AUTO: 5.4 % — SIGNIFICANT CHANGE UP (ref 0–6)
HCT VFR BLD CALC: 33 % — LOW (ref 39–50)
HGB BLD-MCNC: 10.2 G/DL — LOW (ref 13–17)
LYMPHOCYTES # BLD AUTO: 1 K/UL — SIGNIFICANT CHANGE UP (ref 1–3.3)
LYMPHOCYTES # BLD AUTO: 10.9 % — LOW (ref 13–44)
MCHC RBC-ENTMCNC: 29.4 PG — SIGNIFICANT CHANGE UP (ref 27–34)
MCHC RBC-ENTMCNC: 30.8 G/DL — LOW (ref 32–36)
MCV RBC AUTO: 95.3 FL — SIGNIFICANT CHANGE UP (ref 80–100)
MONOCYTES # BLD AUTO: 0.7 K/UL — SIGNIFICANT CHANGE UP (ref 0–0.9)
MONOCYTES NFR BLD AUTO: 7.2 % — SIGNIFICANT CHANGE UP (ref 2–14)
NEUTROPHILS # BLD AUTO: 6.8 K/UL — SIGNIFICANT CHANGE UP (ref 1.8–7.4)
NEUTROPHILS NFR BLD AUTO: 75.1 % — SIGNIFICANT CHANGE UP (ref 43–77)
PLATELET # BLD AUTO: 198 K/UL — SIGNIFICANT CHANGE UP (ref 150–400)
RBC # BLD: 3.46 M/UL — LOW (ref 4.2–5.8)
RBC # FLD: 15 % — HIGH (ref 10.3–14.5)
WBC # BLD: 9.1 K/UL — SIGNIFICANT CHANGE UP (ref 3.8–10.5)
WBC # FLD AUTO: 9.1 K/UL — SIGNIFICANT CHANGE UP (ref 3.8–10.5)

## 2019-03-28 PROCEDURE — 99213 OFFICE O/P EST LOW 20 MIN: CPT

## 2019-03-31 NOTE — HISTORY OF PRESENT ILLNESS
[de-identified] : Mr. King is a 85 yo WM with a long history of renal, disease, , currently Stage 5, who has been treated conservatively, no dialysis, but who has renal related anema, has been on procrit, but HGb has been ranging between 8.6 - 11. Was switched to Aranesp. \par  [de-identified] : Doing well, no excessive fatigue, has a taste of blood in his mouth, intermittently., no active nose bleeding, no fevers, no evidence of bleeding any where else.\par Not sleeping well, but napping for 3-4 hours in the afternoon.

## 2019-03-31 NOTE — PHYSICAL EXAM
[Ambulatory and capable of all self care but unable to carry out any work activities] : Status 2- Ambulatory and capable of all self care but unable to carry out any work activities. Up and about more than 50% of waking hours [Normal] : affect appropriate [de-identified] : upper and lower dentures, no open lesions, seen, no acute evidense of blood in the nares

## 2019-03-31 NOTE — REVIEW OF SYSTEMS
[Fatigue] : fatigue [Negative] : Allergic/Immunologic [FreeTextEntry4] : taste of blood in his mouth, no mouth sores

## 2019-03-31 NOTE — ASSESSMENT
[FreeTextEntry1] : 85 yo WM with Renal anemia, on aranesp, his HGb toady is 10.2 so will give Aranesp, keep him on a Q 2 week schedule. Advised to rinse his mouth with  warm water , if any lesions on gums to F/U with dental

## 2019-04-01 ENCOUNTER — APPOINTMENT (OUTPATIENT)
Dept: NEPHROLOGY | Facility: CLINIC | Age: 84
End: 2019-04-01
Payer: MEDICARE

## 2019-04-01 VITALS
BODY MASS INDEX: 26.66 KG/M2 | HEIGHT: 65 IN | OXYGEN SATURATION: 99 % | SYSTOLIC BLOOD PRESSURE: 166 MMHG | HEART RATE: 61 BPM | WEIGHT: 160 LBS | DIASTOLIC BLOOD PRESSURE: 50 MMHG

## 2019-04-01 PROCEDURE — 36415 COLL VENOUS BLD VENIPUNCTURE: CPT

## 2019-04-01 PROCEDURE — 99215 OFFICE O/P EST HI 40 MIN: CPT | Mod: 25

## 2019-04-01 NOTE — PHYSICAL EXAM
[General Appearance - Alert] : alert [General Appearance - In No Acute Distress] : in no acute distress [General Appearance - Well Nourished] : well nourished [General Appearance - Well Developed] : well developed [Sclera] : the sclera and conjunctiva were normal [PERRL With Normal Accommodation] : pupils were equal in size, round, and reactive to light [Extraocular Movements] : extraocular movements were intact [Strabismus] : no strabismus was seen [Retina Vessels Narrowing Of Arterioles] : narrowing of the arterioles noted [Outer Ear] : the ears and nose were normal in appearance [Hearing Threshold Finger Rub Not [place]] : hearing was normal [Examination Of The Oral Cavity] : the lips and gums were normal [Both Tympanic Membranes Were Examined] : both tympanic membranes were normal [Nasal Cavity] : the nasal mucosa and septum were normal [Oropharynx] : the oropharynx was normal [Neck Appearance] : the appearance of the neck was normal [Neck Cervical Mass (___cm)] : no neck mass was observed [Jugular Venous Distention Increased] : there was no jugular-venous distention [Thyroid Diffuse Enlargement] : the thyroid was not enlarged [Thyroid Nodule] : there were no palpable thyroid nodules [Neck Circumference: ___] : neck circumference is [unfilled] [Respiration, Rhythm And Depth] : normal respiratory rhythm and effort [Exaggerated Use Of Accessory Muscles For Inspiration] : no accessory muscle use [Chest Palpation] : palpation of the chest revealed no abnormalities [Lungs Percussion] : the lungs were normal to percussion [Scattered Wheezes] : scattered wheezing [Decreased Breath Sounds] : decreased breath sounds [Bibasilar Rales/Crackles] : bibasilar rales [Apical Impulse] : the apical impulse was normal [Heart Rate And Rhythm] : heart rate was normal and rhythm regular [Heart Sounds] : normal S1 and S2 [Heart Sounds Gallop] : no gallops [Murmurs] : no murmurs [Heart Sounds Pericardial Friction Rub] : no pericardial rub [Systolic grade ___/6] : A grade [unfilled]/6 systolic murmur was heard. [Arterial Pulses Carotid] : carotid pulses were normal with no bruits [Full Pulse] : the pedal pulses are present [Veins - Varicosity Changes] : there were no varicosital changes [Pitting Edema] : pitting edema present [___ +] : bilateral [unfilled]+ pitting edema to the ankles [Bowel Sounds] : normal bowel sounds [Abdomen Soft] : soft [Abdomen Tenderness] : non-tender [Abdomen Mass (___ Cm)] : no abdominal mass palpated [Abdomen Hernia] : no hernia was discovered [Cervical Lymph Nodes Enlarged Posterior Bilaterally] : posterior cervical [Cervical Lymph Nodes Enlarged Anterior Bilaterally] : anterior cervical [Supraclavicular Lymph Nodes Enlarged Bilaterally] : supraclavicular [Axillary Lymph Nodes Enlarged Bilaterally] : axillary [Femoral Lymph Nodes Enlarged Bilaterally] : femoral [Inguinal Lymph Nodes Enlarged Bilaterally] : inguinal [No CVA Tenderness] : no ~M costovertebral angle tenderness [No Spinal Tenderness] : no spinal tenderness [Abnormal Walk] : normal gait [Nail Clubbing] : no clubbing  or cyanosis of the fingernails [Involuntary Movements] : no involuntary movements were seen [Motor Tone] : muscle strength and tone were normal [___ (cm) Fistula] : [unfilled] (cm) fistula [Bruit] : a bruit was present [Thrill] : a thrill was present [Skin Color & Pigmentation] : normal skin color and pigmentation [Skin Turgor] : normal skin turgor [] : no rash [Skin Lesions] : no skin lesions [Right Foot Was Examined] : right foot was examined [Left Foot Was Examined] : left foot was examined [Normal in Appearance] : normal in appearance [Normal] : normal [Full ROM] : with full range of motion [Cranial Nerves] : cranial nerves 2-12 were intact [Deep Tendon Reflexes (DTR)] : deep tendon reflexes were 2+ and symmetric [Sensation] : the sensory exam was normal to light touch and pinprick [Motor Exam] : the motor exam was normal [No Focal Deficits] : no focal deficits [Oriented To Time, Place, And Person] : oriented to person, place, and time [Impaired Insight] : insight and judgment were intact [Affect] : the affect was normal [Mood] : the mood was normal [Memory Recent] : recent memory was not impaired [Memory Remote] : remote memory was not impaired [FreeTextEntry1] : ROM - R Shoulder limited,

## 2019-04-01 NOTE — ASSESSMENT
[FreeTextEntry1] : 85 YO  M - with moderate Bilateral carotid stenoses , right > Left  , DM - 2 , HTN . 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 edema left more so than right 2+ :\par \par On Low K+ Diet,\par \par Hospitalization for ADHF & now s/p AVF ( > 15 weeks ) Healthy Transition RN involved,\par \par On Nifedipine (  ) - Up Titrated,\par \par S/P R - Carotid Surgery in Oct. 12 - 2017,\par \par Cont., Torsemide 20 mg.,/ 40 mg., Alternatively  &   PRN  Metolazone,\par \par Continue AMY & HD when not able to manage conservatively,\par \par Meds & Labs Reviewed, \par \par \par \par

## 2019-04-01 NOTE — HISTORY OF PRESENT ILLNESS
[___ Month(s) Ago] : [unfilled] month(s) ago [Adding Medication ___] : adding [unfilled] [Stopping Medication ___] : stopping [unfilled] [Ordering Test(s) ___] : ordering [unfilled] [None] : ~He/She~ has no significant interval events [Stage 4] : stage 4 [Diabetic Nephropathy] : diabetic nephropathy [Hypertensive Nephropathy] : hypertensive nephropathy [Nocturia] : nocturia [Edema] : edema [Renal Diet] : renal diet [Diabetes Management] : diabetes management [Vitamin D] : vitamin D [Renal Vitamins] : renal vitamins [Good Compliance] : good compliance  [Good Tolerance] : good tolerance  [Good Symptom Control] : good symptom control [FreeTextEntry1] : DM - 2 ~ 25 years, HX., of Cardiac arrhythmias ( V.Bigeminy )\par \par HTN X ~ 10 years ; No Decompensation of CHF > 3 years,\par \par R- Carotid atherosclerosis , with + Bruit ; S/P CEA,\par \par Compliant w. Diet , DM * HTN , S/P PC Transfusion - 2 units ,\par \par Declined early initiation of HD , AVF +, On  Q 2 weekly Aranesp ( Hematology )\par \par US - Open RCA, Moderate Stenosis - L,

## 2019-04-02 LAB
ALBUMIN SERPL ELPH-MCNC: 3.8 G/DL
ANION GAP SERPL CALC-SCNC: 17 MMOL/L
BASOPHILS # BLD AUTO: 0.08 K/UL
BASOPHILS NFR BLD AUTO: 1.2 %
BUN SERPL-MCNC: 82 MG/DL
CALCIUM SERPL-MCNC: 9.3 MG/DL
CHLORIDE SERPL-SCNC: 109 MMOL/L
CO2 SERPL-SCNC: 17 MMOL/L
CREAT SERPL-MCNC: 4.59 MG/DL
EOSINOPHIL # BLD AUTO: 0.28 K/UL
EOSINOPHIL NFR BLD AUTO: 4.3 %
GLUCOSE SERPL-MCNC: 203 MG/DL
HCT VFR BLD CALC: 32.5 %
HGB BLD-MCNC: 9.4 G/DL
IMM GRANULOCYTES NFR BLD AUTO: 0.5 %
LYMPHOCYTES # BLD AUTO: 0.66 K/UL
LYMPHOCYTES NFR BLD AUTO: 10.2 %
MAN DIFF?: NORMAL
MCHC RBC-ENTMCNC: 28.9 GM/DL
MCHC RBC-ENTMCNC: 29.8 PG
MCV RBC AUTO: 103.2 FL
MONOCYTES # BLD AUTO: 0.45 K/UL
MONOCYTES NFR BLD AUTO: 7 %
NEUTROPHILS # BLD AUTO: 4.96 K/UL
NEUTROPHILS NFR BLD AUTO: 76.8 %
PHOSPHATE SERPL-MCNC: 5.2 MG/DL
PLATELET # BLD AUTO: 193 K/UL
POTASSIUM SERPL-SCNC: 3.9 MMOL/L
RBC # BLD: 3.15 M/UL
RBC # FLD: 16.2 %
SODIUM SERPL-SCNC: 143 MMOL/L
WBC # FLD AUTO: 6.46 K/UL

## 2019-04-11 ENCOUNTER — RESULT REVIEW (OUTPATIENT)
Age: 84
End: 2019-04-11

## 2019-04-11 ENCOUNTER — APPOINTMENT (OUTPATIENT)
Age: 84
End: 2019-04-11

## 2019-04-11 LAB
BASOPHILS # BLD AUTO: 0.1 K/UL — SIGNIFICANT CHANGE UP (ref 0–0.2)
BASOPHILS NFR BLD AUTO: 1.5 % — SIGNIFICANT CHANGE UP (ref 0–2)
EOSINOPHIL # BLD AUTO: 0.3 K/UL — SIGNIFICANT CHANGE UP (ref 0–0.5)
EOSINOPHIL NFR BLD AUTO: 3.9 % — SIGNIFICANT CHANGE UP (ref 0–6)
HCT VFR BLD CALC: 33.6 % — LOW (ref 39–50)
HGB BLD-MCNC: 10.3 G/DL — LOW (ref 13–17)
LYMPHOCYTES # BLD AUTO: 0.9 K/UL — LOW (ref 1–3.3)
LYMPHOCYTES # BLD AUTO: 12.9 % — LOW (ref 13–44)
MCHC RBC-ENTMCNC: 29.5 PG — SIGNIFICANT CHANGE UP (ref 27–34)
MCHC RBC-ENTMCNC: 30.5 G/DL — LOW (ref 32–36)
MCV RBC AUTO: 96.7 FL — SIGNIFICANT CHANGE UP (ref 80–100)
MONOCYTES # BLD AUTO: 0.7 K/UL — SIGNIFICANT CHANGE UP (ref 0–0.9)
MONOCYTES NFR BLD AUTO: 9.6 % — SIGNIFICANT CHANGE UP (ref 2–14)
NEUTROPHILS # BLD AUTO: 4.9 K/UL — SIGNIFICANT CHANGE UP (ref 1.8–7.4)
NEUTROPHILS NFR BLD AUTO: 72.2 % — SIGNIFICANT CHANGE UP (ref 43–77)
PLATELET # BLD AUTO: 196 K/UL — SIGNIFICANT CHANGE UP (ref 150–400)
RBC # BLD: 3.48 M/UL — LOW (ref 4.2–5.8)
RBC # FLD: 15.4 % — HIGH (ref 10.3–14.5)
WBC # BLD: 6.8 K/UL — SIGNIFICANT CHANGE UP (ref 3.8–10.5)
WBC # FLD AUTO: 6.8 K/UL — SIGNIFICANT CHANGE UP (ref 3.8–10.5)

## 2019-04-22 ENCOUNTER — OUTPATIENT (OUTPATIENT)
Dept: OUTPATIENT SERVICES | Facility: HOSPITAL | Age: 84
LOS: 1 days | Discharge: HOME | End: 2019-04-22

## 2019-04-22 DIAGNOSIS — Z98.890 OTHER SPECIFIED POSTPROCEDURAL STATES: Chronic | ICD-10-CM

## 2019-04-22 DIAGNOSIS — D63.1 ANEMIA IN CHRONIC KIDNEY DISEASE: ICD-10-CM

## 2019-04-22 DIAGNOSIS — I77.0 ARTERIOVENOUS FISTULA, ACQUIRED: Chronic | ICD-10-CM

## 2019-04-22 DIAGNOSIS — N18.5 CHRONIC KIDNEY DISEASE, STAGE 5: ICD-10-CM

## 2019-04-22 DIAGNOSIS — Z98.62 PERIPHERAL VASCULAR ANGIOPLASTY STATUS: Chronic | ICD-10-CM

## 2019-04-25 ENCOUNTER — APPOINTMENT (OUTPATIENT)
Dept: HEMATOLOGY ONCOLOGY | Facility: CLINIC | Age: 84
End: 2019-04-25

## 2019-04-25 ENCOUNTER — RESULT REVIEW (OUTPATIENT)
Age: 84
End: 2019-04-25

## 2019-04-25 ENCOUNTER — APPOINTMENT (OUTPATIENT)
Age: 84
End: 2019-04-25

## 2019-04-25 LAB
BASOPHILS # BLD AUTO: 0.1 K/UL — SIGNIFICANT CHANGE UP (ref 0–0.2)
BASOPHILS NFR BLD AUTO: 1.9 % — SIGNIFICANT CHANGE UP (ref 0–2)
EOSINOPHIL # BLD AUTO: 0.2 K/UL — SIGNIFICANT CHANGE UP (ref 0–0.5)
EOSINOPHIL NFR BLD AUTO: 2.4 % — SIGNIFICANT CHANGE UP (ref 0–6)
HCT VFR BLD CALC: 31.1 % — LOW (ref 39–50)
HGB BLD-MCNC: 9.4 G/DL — LOW (ref 13–17)
LYMPHOCYTES # BLD AUTO: 0.9 K/UL — LOW (ref 1–3.3)
LYMPHOCYTES # BLD AUTO: 12.8 % — LOW (ref 13–44)
MCHC RBC-ENTMCNC: 29.3 PG — SIGNIFICANT CHANGE UP (ref 27–34)
MCHC RBC-ENTMCNC: 30.3 G/DL — LOW (ref 32–36)
MCV RBC AUTO: 96.7 FL — SIGNIFICANT CHANGE UP (ref 80–100)
MONOCYTES # BLD AUTO: 0.5 K/UL — SIGNIFICANT CHANGE UP (ref 0–0.9)
MONOCYTES NFR BLD AUTO: 6.4 % — SIGNIFICANT CHANGE UP (ref 2–14)
NEUTROPHILS # BLD AUTO: 5.6 K/UL — SIGNIFICANT CHANGE UP (ref 1.8–7.4)
NEUTROPHILS NFR BLD AUTO: 76.5 % — SIGNIFICANT CHANGE UP (ref 43–77)
PLATELET # BLD AUTO: 195 K/UL — SIGNIFICANT CHANGE UP (ref 150–400)
RBC # BLD: 3.22 M/UL — LOW (ref 4.2–5.8)
RBC # FLD: 15.4 % — HIGH (ref 10.3–14.5)
WBC # BLD: 7.4 K/UL — SIGNIFICANT CHANGE UP (ref 3.8–10.5)
WBC # FLD AUTO: 7.4 K/UL — SIGNIFICANT CHANGE UP (ref 3.8–10.5)

## 2019-05-09 ENCOUNTER — RESULT REVIEW (OUTPATIENT)
Age: 84
End: 2019-05-09

## 2019-05-09 ENCOUNTER — APPOINTMENT (OUTPATIENT)
Age: 84
End: 2019-05-09

## 2019-05-09 LAB
BASOPHILS # BLD AUTO: 0.1 K/UL — SIGNIFICANT CHANGE UP (ref 0–0.2)
BASOPHILS NFR BLD AUTO: 1.6 % — SIGNIFICANT CHANGE UP (ref 0–2)
EOSINOPHIL # BLD AUTO: 0.2 K/UL — SIGNIFICANT CHANGE UP (ref 0–0.5)
EOSINOPHIL NFR BLD AUTO: 2.7 % — SIGNIFICANT CHANGE UP (ref 0–6)
HCT VFR BLD CALC: 30.4 % — LOW (ref 39–50)
HGB BLD-MCNC: 9 G/DL — LOW (ref 13–17)
LYMPHOCYTES # BLD AUTO: 0.8 K/UL — LOW (ref 1–3.3)
LYMPHOCYTES # BLD AUTO: 11.8 % — LOW (ref 13–44)
MCHC RBC-ENTMCNC: 28.6 PG — SIGNIFICANT CHANGE UP (ref 27–34)
MCHC RBC-ENTMCNC: 29.6 G/DL — LOW (ref 32–36)
MCV RBC AUTO: 96.7 FL — SIGNIFICANT CHANGE UP (ref 80–100)
MONOCYTES # BLD AUTO: 0.6 K/UL — SIGNIFICANT CHANGE UP (ref 0–0.9)
MONOCYTES NFR BLD AUTO: 9.2 % — SIGNIFICANT CHANGE UP (ref 2–14)
NEUTROPHILS # BLD AUTO: 4.9 K/UL — SIGNIFICANT CHANGE UP (ref 1.8–7.4)
NEUTROPHILS NFR BLD AUTO: 74.6 % — SIGNIFICANT CHANGE UP (ref 43–77)
PLATELET # BLD AUTO: 182 K/UL — SIGNIFICANT CHANGE UP (ref 150–400)
RBC # BLD: 3.14 M/UL — LOW (ref 4.2–5.8)
RBC # FLD: 15.9 % — HIGH (ref 10.3–14.5)
WBC # BLD: 6.5 K/UL — SIGNIFICANT CHANGE UP (ref 3.8–10.5)
WBC # FLD AUTO: 6.5 K/UL — SIGNIFICANT CHANGE UP (ref 3.8–10.5)

## 2019-05-13 ENCOUNTER — APPOINTMENT (OUTPATIENT)
Dept: NEPHROLOGY | Facility: CLINIC | Age: 84
End: 2019-05-13
Payer: MEDICARE

## 2019-05-13 VITALS
SYSTOLIC BLOOD PRESSURE: 172 MMHG | HEIGHT: 65 IN | HEART RATE: 66 BPM | BODY MASS INDEX: 26.66 KG/M2 | WEIGHT: 160 LBS | DIASTOLIC BLOOD PRESSURE: 70 MMHG

## 2019-05-13 PROCEDURE — 99215 OFFICE O/P EST HI 40 MIN: CPT | Mod: 25

## 2019-05-13 PROCEDURE — 36415 COLL VENOUS BLD VENIPUNCTURE: CPT

## 2019-05-13 NOTE — PHYSICAL EXAM
[General Appearance - Alert] : alert [General Appearance - In No Acute Distress] : in no acute distress [General Appearance - Well Nourished] : well nourished [General Appearance - Well Developed] : well developed [PERRL With Normal Accommodation] : pupils were equal in size, round, and reactive to light [Sclera] : the sclera and conjunctiva were normal [Extraocular Movements] : extraocular movements were intact [Strabismus] : no strabismus was seen [Retina Vessels Narrowing Of Arterioles] : narrowing of the arterioles noted [Outer Ear] : the ears and nose were normal in appearance [Hearing Threshold Finger Rub Not [place]] : hearing was normal [Examination Of The Oral Cavity] : the lips and gums were normal [Both Tympanic Membranes Were Examined] : both tympanic membranes were normal [Nasal Cavity] : the nasal mucosa and septum were normal [Oropharynx] : the oropharynx was normal [Neck Appearance] : the appearance of the neck was normal [Jugular Venous Distention Increased] : there was no jugular-venous distention [Neck Cervical Mass (___cm)] : no neck mass was observed [Thyroid Diffuse Enlargement] : the thyroid was not enlarged [Thyroid Nodule] : there were no palpable thyroid nodules [Neck Circumference: ___] : neck circumference is [unfilled] [Respiration, Rhythm And Depth] : normal respiratory rhythm and effort [Exaggerated Use Of Accessory Muscles For Inspiration] : no accessory muscle use [Chest Palpation] : palpation of the chest revealed no abnormalities [Lungs Percussion] : the lungs were normal to percussion [Scattered Wheezes] : scattered wheezing [Decreased Breath Sounds] : decreased breath sounds [Bibasilar Rales/Crackles] : bibasilar rales [Apical Impulse] : the apical impulse was normal [Heart Rate And Rhythm] : heart rate was normal and rhythm regular [Heart Sounds] : normal S1 and S2 [Heart Sounds Gallop] : no gallops [Murmurs] : no murmurs [Heart Sounds Pericardial Friction Rub] : no pericardial rub [Systolic grade ___/6] : A grade [unfilled]/6 systolic murmur was heard. [Arterial Pulses Carotid] : carotid pulses were normal with no bruits [Veins - Varicosity Changes] : there were no varicosital changes [Full Pulse] : the pedal pulses are present [___ +] : bilateral [unfilled]+ pitting edema to the ankles [Pitting Edema] : pitting edema present [Abdomen Soft] : soft [Bowel Sounds] : normal bowel sounds [Abdomen Tenderness] : non-tender [Abdomen Hernia] : no hernia was discovered [Abdomen Mass (___ Cm)] : no abdominal mass palpated [Cervical Lymph Nodes Enlarged Posterior Bilaterally] : posterior cervical [Cervical Lymph Nodes Enlarged Anterior Bilaterally] : anterior cervical [Supraclavicular Lymph Nodes Enlarged Bilaterally] : supraclavicular [Axillary Lymph Nodes Enlarged Bilaterally] : axillary [Femoral Lymph Nodes Enlarged Bilaterally] : femoral [Inguinal Lymph Nodes Enlarged Bilaterally] : inguinal [No CVA Tenderness] : no ~M costovertebral angle tenderness [No Spinal Tenderness] : no spinal tenderness [Abnormal Walk] : normal gait [Nail Clubbing] : no clubbing  or cyanosis of the fingernails [Motor Tone] : muscle strength and tone were normal [Involuntary Movements] : no involuntary movements were seen [___ (cm) Fistula] : [unfilled] (cm) fistula [Thrill] : a thrill was present [Bruit] : a bruit was present [Skin Color & Pigmentation] : normal skin color and pigmentation [Skin Lesions] : no skin lesions [Skin Turgor] : normal skin turgor [] : no rash [Left Foot Was Examined] : left foot was examined [Right Foot Was Examined] : right foot was examined [Normal in Appearance] : normal in appearance [Normal] : normal [Full ROM] : with full range of motion [Cranial Nerves] : cranial nerves 2-12 were intact [Deep Tendon Reflexes (DTR)] : deep tendon reflexes were 2+ and symmetric [Sensation] : the sensory exam was normal to light touch and pinprick [Motor Exam] : the motor exam was normal [No Focal Deficits] : no focal deficits [Oriented To Time, Place, And Person] : oriented to person, place, and time [Affect] : the affect was normal [Impaired Insight] : insight and judgment were intact [Memory Recent] : recent memory was not impaired [Mood] : the mood was normal [Memory Remote] : remote memory was not impaired [FreeTextEntry1] : ROM - R Shoulder limited,

## 2019-05-13 NOTE — ASSESSMENT
[FreeTextEntry1] : 85 YO  M - with moderate Bilateral carotid stenoses , right > Left  , DM - 2 , HTN . 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 edema left more so than right 2+ :\par \par On Low K+ Diet,\par \par Hospitalization for ADHF & now s/p AVF ( > 15 weeks ) Healthy Transition RN involved,\par \par On Nifedipine (  ) - Up Titrated,\par \par S/P R - Carotid Surgery in Oct. 12 - 2017,\par \par Usual Dose : Torsemide 20 mg.,/ 40 mg., Alternatively, Now on 40 mg., daily   &   PRN  Metolazone,\par \par Continue AMY & HD when not able to manage conservatively,\par \par Meds & Labs Reviewed, \par \par \par \par

## 2019-05-13 NOTE — HISTORY OF PRESENT ILLNESS
[___ Month(s) Ago] : [unfilled] month(s) ago [Adding Medication ___] : adding [unfilled] [Stopping Medication ___] : stopping [unfilled] [None] : ~He/She~ has no significant interval events [Ordering Test(s) ___] : ordering [unfilled] [Stage 4] : stage 4 [Diabetic Nephropathy] : diabetic nephropathy [Hypertensive Nephropathy] : hypertensive nephropathy [Edema] : edema [Nocturia] : nocturia [Renal Diet] : renal diet [Diabetes Management] : diabetes management [Vitamin D] : vitamin D [Renal Vitamins] : renal vitamins [Good Compliance] : good compliance  [Good Tolerance] : good tolerance  [Good Symptom Control] : good symptom control [FreeTextEntry1] : DM - 2 ~ 25 years, HX., of Cardiac arrhythmias ( V.Bigeminy )\par \par HTN X ~ 10 years ; No Decompensation of CHF > 3 years,\par \par R- Carotid atherosclerosis , with + Bruit ; S/P CEA,\par \par Compliant w. Diet , DM * HTN , S/P PC Transfusion - 2 units ,\par \par Declined early initiation of HD , AVF +, On  Q 2 weekly Aranesp ( Hematology )\par \par US - Open RCA, Moderate Stenosis - L,

## 2019-05-15 LAB
25(OH)D3 SERPL-MCNC: 27.5 NG/ML
ALBUMIN SERPL ELPH-MCNC: 4.1 G/DL
ANION GAP SERPL CALC-SCNC: 13 MMOL/L
APPEARANCE: CLEAR
BACTERIA: NEGATIVE
BASOPHILS # BLD AUTO: 0.1 K/UL
BASOPHILS NFR BLD AUTO: 1.4 %
BILIRUBIN URINE: NEGATIVE
BLOOD URINE: NEGATIVE
BUN SERPL-MCNC: 84 MG/DL
CALCIUM SERPL-MCNC: 9.4 MG/DL
CALCIUM SERPL-MCNC: 9.4 MG/DL
CHLORIDE SERPL-SCNC: 109 MMOL/L
CO2 SERPL-SCNC: 18 MMOL/L
COLOR: COLORLESS
CREAT SERPL-MCNC: 4.19 MG/DL
EOSINOPHIL # BLD AUTO: 0.16 K/UL
EOSINOPHIL NFR BLD AUTO: 2.3 %
ESTIMATED AVERAGE GLUCOSE: 88 MG/DL
GLUCOSE QUALITATIVE U: NEGATIVE
GLUCOSE SERPL-MCNC: 123 MG/DL
HBA1C MFR BLD HPLC: 4.7 %
HCT VFR BLD CALC: 30.6 %
HGB BLD-MCNC: 9.2 G/DL
HYALINE CASTS: 2 /LPF
IMM GRANULOCYTES NFR BLD AUTO: 0.3 %
KETONES URINE: NEGATIVE
LEUKOCYTE ESTERASE URINE: NEGATIVE
LYMPHOCYTES # BLD AUTO: 0.84 K/UL
LYMPHOCYTES NFR BLD AUTO: 11.8 %
MAN DIFF?: NORMAL
MCHC RBC-ENTMCNC: 30.1 GM/DL
MCHC RBC-ENTMCNC: 30.4 PG
MCV RBC AUTO: 101 FL
MICROSCOPIC-UA: NORMAL
MONOCYTES # BLD AUTO: 0.64 K/UL
MONOCYTES NFR BLD AUTO: 9 %
NEUTROPHILS # BLD AUTO: 5.33 K/UL
NEUTROPHILS NFR BLD AUTO: 75.2 %
NITRITE URINE: NEGATIVE
PARATHYROID HORMONE INTACT: 57 PG/ML
PH URINE: 5
PHOSPHATE SERPL-MCNC: 5.3 MG/DL
PLATELET # BLD AUTO: 216 K/UL
POTASSIUM SERPL-SCNC: 4.1 MMOL/L
PROTEIN URINE: ABNORMAL
RBC # BLD: 3.03 M/UL
RBC # FLD: 16.8 %
RED BLOOD CELLS URINE: 1 /HPF
SODIUM SERPL-SCNC: 140 MMOL/L
SPECIFIC GRAVITY URINE: 1.01
SQUAMOUS EPITHELIAL CELLS: 0 /HPF
UROBILINOGEN URINE: NORMAL
WBC # FLD AUTO: 7.09 K/UL
WHITE BLOOD CELLS URINE: 0 /HPF

## 2019-05-21 ENCOUNTER — OUTPATIENT (OUTPATIENT)
Dept: OUTPATIENT SERVICES | Facility: HOSPITAL | Age: 84
LOS: 1 days | Discharge: HOME | End: 2019-05-21

## 2019-05-21 DIAGNOSIS — Z98.890 OTHER SPECIFIED POSTPROCEDURAL STATES: Chronic | ICD-10-CM

## 2019-05-21 DIAGNOSIS — I77.0 ARTERIOVENOUS FISTULA, ACQUIRED: Chronic | ICD-10-CM

## 2019-05-21 DIAGNOSIS — N18.5 CHRONIC KIDNEY DISEASE, STAGE 5: ICD-10-CM

## 2019-05-21 DIAGNOSIS — Z98.62 PERIPHERAL VASCULAR ANGIOPLASTY STATUS: Chronic | ICD-10-CM

## 2019-05-21 DIAGNOSIS — D63.1 ANEMIA IN CHRONIC KIDNEY DISEASE: ICD-10-CM

## 2019-05-23 ENCOUNTER — RESULT REVIEW (OUTPATIENT)
Age: 84
End: 2019-05-23

## 2019-05-23 ENCOUNTER — APPOINTMENT (OUTPATIENT)
Dept: HEMATOLOGY ONCOLOGY | Facility: CLINIC | Age: 84
End: 2019-05-23

## 2019-05-23 ENCOUNTER — APPOINTMENT (OUTPATIENT)
Age: 84
End: 2019-05-23

## 2019-05-23 LAB
BASOPHILS # BLD AUTO: 0.1 K/UL — SIGNIFICANT CHANGE UP (ref 0–0.2)
BASOPHILS NFR BLD AUTO: 2.1 % — HIGH (ref 0–2)
EOSINOPHIL # BLD AUTO: 0.1 K/UL — SIGNIFICANT CHANGE UP (ref 0–0.5)
EOSINOPHIL NFR BLD AUTO: 2 % — SIGNIFICANT CHANGE UP (ref 0–6)
HCT VFR BLD CALC: 32.8 % — LOW (ref 39–50)
HGB BLD-MCNC: 9.8 G/DL — LOW (ref 13–17)
LYMPHOCYTES # BLD AUTO: 0.8 K/UL — LOW (ref 1–3.3)
LYMPHOCYTES # BLD AUTO: 11.4 % — LOW (ref 13–44)
MCHC RBC-ENTMCNC: 29.1 PG — SIGNIFICANT CHANGE UP (ref 27–34)
MCHC RBC-ENTMCNC: 29.9 G/DL — LOW (ref 32–36)
MCV RBC AUTO: 97.3 FL — SIGNIFICANT CHANGE UP (ref 80–100)
MONOCYTES # BLD AUTO: 0.7 K/UL — SIGNIFICANT CHANGE UP (ref 0–0.9)
MONOCYTES NFR BLD AUTO: 9.4 % — SIGNIFICANT CHANGE UP (ref 2–14)
NEUTROPHILS # BLD AUTO: 5.2 K/UL — SIGNIFICANT CHANGE UP (ref 1.8–7.4)
NEUTROPHILS NFR BLD AUTO: 75.1 % — SIGNIFICANT CHANGE UP (ref 43–77)
PLATELET # BLD AUTO: 200 K/UL — SIGNIFICANT CHANGE UP (ref 150–400)
RBC # BLD: 3.37 M/UL — LOW (ref 4.2–5.8)
RBC # FLD: 15.6 % — HIGH (ref 10.3–14.5)
WBC # BLD: 6.9 K/UL — SIGNIFICANT CHANGE UP (ref 3.8–10.5)
WBC # FLD AUTO: 6.9 K/UL — SIGNIFICANT CHANGE UP (ref 3.8–10.5)

## 2019-06-06 ENCOUNTER — APPOINTMENT (OUTPATIENT)
Age: 84
End: 2019-06-06

## 2019-06-06 ENCOUNTER — RESULT REVIEW (OUTPATIENT)
Age: 84
End: 2019-06-06

## 2019-06-06 LAB
BASOPHILS # BLD AUTO: 0.1 K/UL — SIGNIFICANT CHANGE UP (ref 0–0.2)
BASOPHILS NFR BLD AUTO: 1.5 % — SIGNIFICANT CHANGE UP (ref 0–2)
EOSINOPHIL # BLD AUTO: 0.1 K/UL — SIGNIFICANT CHANGE UP (ref 0–0.5)
EOSINOPHIL NFR BLD AUTO: 1.6 % — SIGNIFICANT CHANGE UP (ref 0–6)
HCT VFR BLD CALC: 31.7 % — LOW (ref 39–50)
HGB BLD-MCNC: 9.9 G/DL — LOW (ref 13–17)
LYMPHOCYTES # BLD AUTO: 0.8 K/UL — LOW (ref 1–3.3)
LYMPHOCYTES # BLD AUTO: 11.7 % — LOW (ref 13–44)
MCHC RBC-ENTMCNC: 30 PG — SIGNIFICANT CHANGE UP (ref 27–34)
MCHC RBC-ENTMCNC: 31.4 G/DL — LOW (ref 32–36)
MCV RBC AUTO: 95.4 FL — SIGNIFICANT CHANGE UP (ref 80–100)
MONOCYTES # BLD AUTO: 0.5 K/UL — SIGNIFICANT CHANGE UP (ref 0–0.9)
MONOCYTES NFR BLD AUTO: 7.6 % — SIGNIFICANT CHANGE UP (ref 2–14)
NEUTROPHILS # BLD AUTO: 5.1 K/UL — SIGNIFICANT CHANGE UP (ref 1.8–7.4)
NEUTROPHILS NFR BLD AUTO: 77.5 % — HIGH (ref 43–77)
PLATELET # BLD AUTO: 206 K/UL — SIGNIFICANT CHANGE UP (ref 150–400)
RBC # BLD: 3.32 M/UL — LOW (ref 4.2–5.8)
RBC # FLD: 15.1 % — HIGH (ref 10.3–14.5)
WBC # BLD: 6.6 K/UL — SIGNIFICANT CHANGE UP (ref 3.8–10.5)
WBC # FLD AUTO: 6.6 K/UL — SIGNIFICANT CHANGE UP (ref 3.8–10.5)

## 2019-06-12 NOTE — PATIENT PROFILE ADULT. - LIVES WITH, PROFILE
Nephrology Attending   Inpatient Progress Note    Admit Date: 2019                                  PCP: Christen Mckeon DO    Patient Active Problem List   Diagnosis    Hypertension    [facility]cess of lower lobe of right lung with pneumonia [facility]    Tobacco abuse    COPD exacerbation ([facility]    Hyponatremia    Congestive heart failure ([facility]    Pneumonia    Respiratory failure ([facility]    Pneumatocele of lung    Coronavirus infection       Subjective:      :Radha Borges is a 59 y.o. female with history of COPD, CHF and HTN. She initially presented to [address] [place] [address] ED  with c/o cnfusion and bilateral legs pain oover th past 2 days. Her daughter went to check on her at her home yesterday and noted patient to be lying on the floor rubbing her legs with icy hot packs. EMS was called and she was brought to ED. Upon arrival vitals showed /113 T 98.2 AND P 91. Initial labs was significant for Na 112, K 5,M cL 73, Glu 107, BUN 17 and Cr 0.7. Noncontrast head CT showed no acute findings. CXR showed  minimal pulm vascular congestion. . Due to lack of ICU beds patient was transferred to [facility] MICU for further management. .   : mORE               Vitals:  VITALS:  BP (!) 96/49   Pulse 81   Temp 98.6 °F (37 °C)   Resp 16   Ht 5' 4\" (1.626 m)   Wt 276 lb 7.3 oz (125.4 kg)   SpO2 94%   BMI 47.45 kg/m²   24HR INTAKE/OUTPUT:      Intake/Output Summary (Last 24 hours) at 2019 0953  Last data filed at 2019 0900  Gross per 24 hour   Intake 611 ml   Output 2167 ml   Net -1556 ml     CURRENT PULSE OXIMETRY:  SpO2: 94 %  24HR PULSE OXIMETRY RANGE:  SpO2  Av.2 %  Min: 90 %  Max: 100 %        I/O:      I/O last 3 completed shifts:   In: 621 [P.O.:90; I.V.:531]  Out:  [Urine:]  I/O this shift:  In: -   Out: 250 [Urine:250]    Medications:    IV:     desmopressin  1 mcg Intravenous 3 times per day    montelukast  10 mg Oral Nightly    losartan  100 mg Oral Daily    sodium chloride flush  10 mL Intravenous 2 times per day    enoxaparin  40 mg Subcutaneous Daily    ipratropium-albuterol  1 ampule Inhalation 4x daily        Current Meds:  Current Facility-Administered Medications   Medication Dose Route Frequency Provider Last Rate Last Dose    desmopressin (DDAVP) injection 1 mcg  1 mcg Intravenous 3 times per day Lisbeth Dunbar MD   1 mcg at 06/12/19 0844    montelukast (SINGULAIR) tablet 10 mg  10 mg Oral Nightly Klarissa Clifton MD   10 mg at 06/11/19 2142    losartan (COZAAR) tablet 100 mg  100 mg Oral Daily Shawanda Brewer MD   100 mg at 06/12/19 0845    sodium chloride flush 0.9 % injection 10 mL  10 mL Intravenous 2 times per day Klarissa Clifton MD   10 mL at 06/12/19 0845    sodium chloride flush 0.9 % injection 10 mL  10 mL Intravenous PRN Klarissa Clifton MD   10 mL at 06/12/19 0847    magnesium hydroxide (MILK OF MAGNESIA) 400 MG/5ML suspension 30 mL  30 mL Oral Daily PRN Klarissa Clifton MD        ondansetron (ZOFRAN) injection 4 mg  4 mg Intravenous Q6H PRN Shawanda Brewer MD        enoxaparin (LOVENOX) injection 40 mg  40 mg Subcutaneous Daily Shawanda Brewer MD   40 mg at 06/12/19 0845    ipratropium-albuterol (DUONEB) nebulizer solution 1 ampule  1 ampule Inhalation 4x daily Klarissa Clifton MD   1 ampule at 06/11/19 2017    acetaminophen (TYLENOL) tablet 650 mg  650 mg Oral Q4H PRN Klarissa Clifton MD           Diet:  DIET GENERAL; Daily Fluid Restriction: Dry Tray     EXAM:  General: No distress. Alert. Eyes: PERRL. No sclera icterus. No conjunctival injection. ENT: No discharge. Pharynx clear. Neck: Trachea midline. Normal thyroid. Lungs: No accessory muscle use. No crackles. No wheezing. No rhonchi. CV: Regular rate. Regular rhythm. No murmur or rub. .   Abd: Non-tender. Non-distended. No masses. No organmegaly. Normal bowel sounds. Skin: Warm and dry. No nodule on exposed extremities. No rash on exposed extremities. Ext: No cyanosis, clubbing, edema   Neuro: Awake. Follows commands. children/spouse

## 2019-06-18 ENCOUNTER — APPOINTMENT (OUTPATIENT)
Dept: VASCULAR SURGERY | Facility: CLINIC | Age: 84
End: 2019-06-18
Payer: MEDICARE

## 2019-06-18 VITALS
WEIGHT: 160 LBS | RESPIRATION RATE: 16 BRPM | DIASTOLIC BLOOD PRESSURE: 56 MMHG | SYSTOLIC BLOOD PRESSURE: 137 MMHG | TEMPERATURE: 98.4 F | HEART RATE: 59 BPM | OXYGEN SATURATION: 99 % | HEIGHT: 65 IN | BODY MASS INDEX: 26.66 KG/M2

## 2019-06-18 PROCEDURE — 99214 OFFICE O/P EST MOD 30 MIN: CPT

## 2019-07-01 ENCOUNTER — APPOINTMENT (OUTPATIENT)
Dept: NEPHROLOGY | Facility: CLINIC | Age: 84
End: 2019-07-01
Payer: MEDICARE

## 2019-07-01 VITALS
DIASTOLIC BLOOD PRESSURE: 72 MMHG | BODY MASS INDEX: 27.32 KG/M2 | HEIGHT: 65 IN | WEIGHT: 164 LBS | HEART RATE: 65 BPM | SYSTOLIC BLOOD PRESSURE: 130 MMHG | OXYGEN SATURATION: 97 %

## 2019-07-01 VITALS — SYSTOLIC BLOOD PRESSURE: 136 MMHG | DIASTOLIC BLOOD PRESSURE: 58 MMHG

## 2019-07-01 VITALS — SYSTOLIC BLOOD PRESSURE: 130 MMHG | DIASTOLIC BLOOD PRESSURE: 58 MMHG

## 2019-07-01 PROCEDURE — 36415 COLL VENOUS BLD VENIPUNCTURE: CPT

## 2019-07-01 PROCEDURE — 99215 OFFICE O/P EST HI 40 MIN: CPT | Mod: 25

## 2019-07-01 NOTE — ASSESSMENT
[FreeTextEntry1] : 83 YO  M - with moderate Bilateral carotid stenoses , right > Left  , DM - 2 , HTN . 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 edema left more so than right 2+ :\par \par On Low K+ Diet,\par \par Hospitalization for ADHF & now s/p AVF ( > 15 weeks ) Healthy Transition RN involved,\par \par On Nifedipine (  ) - Up Titrated,\par \par S/P R - Carotid Surgery in Oct. 12 - 2017,\par \par Usual Dose : Torsemide 20 mg.,/ 40 mg., Alternatively, Now on 40 mg., daily   &   PRN  Metolazone,\par \par Continue AMY & HD when not able to manage conservatively,\par \par Meds & Labs Reviewed, \par \par AVF Maturing, Fell X 2 @ Home ( Tripped X 1 & 2nd event likely related to DM - Neuropathy,\par \par Rec : Use Cane  or a Walker, \par \par \par \par

## 2019-07-01 NOTE — PHYSICAL EXAM

## 2019-07-02 LAB
ALBUMIN SERPL ELPH-MCNC: 3.9 G/DL
ANION GAP SERPL CALC-SCNC: 14 MMOL/L
BASOPHILS # BLD AUTO: 0.07 K/UL
BASOPHILS NFR BLD AUTO: 1 %
BUN SERPL-MCNC: 87 MG/DL
CALCIUM SERPL-MCNC: 9 MG/DL
CHLORIDE SERPL-SCNC: 107 MMOL/L
CO2 SERPL-SCNC: 19 MMOL/L
CREAT SERPL-MCNC: 4.71 MG/DL
EOSINOPHIL # BLD AUTO: 0.14 K/UL
EOSINOPHIL NFR BLD AUTO: 2 %
GLUCOSE SERPL-MCNC: 171 MG/DL
HCT VFR BLD CALC: 32.8 %
HGB BLD-MCNC: 9.7 G/DL
IMM GRANULOCYTES NFR BLD AUTO: 0.3 %
LYMPHOCYTES # BLD AUTO: 0.95 K/UL
LYMPHOCYTES NFR BLD AUTO: 13.4 %
MAN DIFF?: NORMAL
MCHC RBC-ENTMCNC: 29.3 PG
MCHC RBC-ENTMCNC: 29.6 GM/DL
MCV RBC AUTO: 99.1 FL
MONOCYTES # BLD AUTO: 0.56 K/UL
MONOCYTES NFR BLD AUTO: 7.9 %
NEUTROPHILS # BLD AUTO: 5.37 K/UL
NEUTROPHILS NFR BLD AUTO: 75.4 %
PHOSPHATE SERPL-MCNC: 5.3 MG/DL
PLATELET # BLD AUTO: 200 K/UL
POTASSIUM SERPL-SCNC: 5.1 MMOL/L
RBC # BLD: 3.31 M/UL
RBC # FLD: 14.6 %
SODIUM SERPL-SCNC: 140 MMOL/L
WBC # FLD AUTO: 7.11 K/UL

## 2019-07-02 NOTE — HISTORY OF PRESENT ILLNESS
[FreeTextEntry1] : 83 y/o male s/p R CEA 10/10/17 and S/P LUE AVF 5/4/18. He continues to feel well. No reports of dizziness, lightheadedness, headaches, blurry vision, amaurosis fugax, no neurological or memory complaints. He continues to see Dr Avendano for CKD V not on HD. He has been maintaining status with diet and medication. He has intermittent anemia for which he gets injections. No reports or weakness, pain or tingling to his left hand.

## 2019-07-02 NOTE — PHYSICAL EXAM
[2+] : right 2+ [Oriented to Place] : oriented to place [Oriented to Person] : oriented to person [Alert] : alert [Oriented to Time] : oriented to time [Calm] : calm [Right Carotid Bruit] : no bruit heard over the right carotid [Left Carotid Bruit] : no bruit heard over the left carotid [de-identified] : WD, WN, NAD. Awake, alert, interactive. Ambulates without difficulty [de-identified] : ELIJAH, PERPIEROL [de-identified] : supple [FreeTextEntry1] : Good L AVF thrill. [de-identified] : no cyanosis or deformity. full ROM, MS 5/5\par  [de-identified] : MECHELLE

## 2019-07-03 ENCOUNTER — RESULT REVIEW (OUTPATIENT)
Age: 84
End: 2019-07-03

## 2019-07-03 ENCOUNTER — APPOINTMENT (OUTPATIENT)
Age: 84
End: 2019-07-03

## 2019-07-03 ENCOUNTER — OUTPATIENT (OUTPATIENT)
Dept: OUTPATIENT SERVICES | Facility: HOSPITAL | Age: 84
LOS: 1 days | Discharge: HOME | End: 2019-07-03

## 2019-07-03 DIAGNOSIS — Z98.890 OTHER SPECIFIED POSTPROCEDURAL STATES: Chronic | ICD-10-CM

## 2019-07-03 DIAGNOSIS — Z98.62 PERIPHERAL VASCULAR ANGIOPLASTY STATUS: Chronic | ICD-10-CM

## 2019-07-03 DIAGNOSIS — D63.1 ANEMIA IN CHRONIC KIDNEY DISEASE: ICD-10-CM

## 2019-07-03 DIAGNOSIS — I77.0 ARTERIOVENOUS FISTULA, ACQUIRED: Chronic | ICD-10-CM

## 2019-07-03 LAB
BASOPHILS # BLD AUTO: 0.2 K/UL — SIGNIFICANT CHANGE UP (ref 0–0.2)
BASOPHILS NFR BLD AUTO: 2 % — SIGNIFICANT CHANGE UP (ref 0–2)
EOSINOPHIL # BLD AUTO: 0.2 K/UL — SIGNIFICANT CHANGE UP (ref 0–0.5)
EOSINOPHIL NFR BLD AUTO: 2.5 % — SIGNIFICANT CHANGE UP (ref 0–6)
HCT VFR BLD CALC: 30.6 % — LOW (ref 39–50)
HGB BLD-MCNC: 9.7 G/DL — LOW (ref 13–17)
LYMPHOCYTES # BLD AUTO: 1.2 K/UL — SIGNIFICANT CHANGE UP (ref 1–3.3)
LYMPHOCYTES # BLD AUTO: 14.8 % — SIGNIFICANT CHANGE UP (ref 13–44)
MCHC RBC-ENTMCNC: 29.1 PG — SIGNIFICANT CHANGE UP (ref 27–34)
MCHC RBC-ENTMCNC: 31.6 G/DL — LOW (ref 32–36)
MCV RBC AUTO: 92.1 FL — SIGNIFICANT CHANGE UP (ref 80–100)
MONOCYTES # BLD AUTO: 0.7 K/UL — SIGNIFICANT CHANGE UP (ref 0–0.9)
MONOCYTES NFR BLD AUTO: 9.1 % — SIGNIFICANT CHANGE UP (ref 2–14)
NEUTROPHILS # BLD AUTO: 5.9 K/UL — SIGNIFICANT CHANGE UP (ref 1.8–7.4)
NEUTROPHILS NFR BLD AUTO: 71.7 % — SIGNIFICANT CHANGE UP (ref 43–77)
PLATELET # BLD AUTO: 206 K/UL — SIGNIFICANT CHANGE UP (ref 150–400)
RBC # BLD: 3.32 M/UL — LOW (ref 4.2–5.8)
RBC # FLD: 14 % — SIGNIFICANT CHANGE UP (ref 10.3–14.5)
WBC # BLD: 8.2 K/UL — SIGNIFICANT CHANGE UP (ref 3.8–10.5)
WBC # FLD AUTO: 8.2 K/UL — SIGNIFICANT CHANGE UP (ref 3.8–10.5)

## 2019-07-05 DIAGNOSIS — N18.5 CHRONIC KIDNEY DISEASE, STAGE 5: ICD-10-CM

## 2019-07-17 ENCOUNTER — APPOINTMENT (OUTPATIENT)
Age: 84
End: 2019-07-17

## 2019-07-18 ENCOUNTER — RESULT REVIEW (OUTPATIENT)
Age: 84
End: 2019-07-18

## 2019-07-18 ENCOUNTER — APPOINTMENT (OUTPATIENT)
Age: 84
End: 2019-07-18

## 2019-07-18 LAB
BASOPHILS # BLD AUTO: 0.1 K/UL — SIGNIFICANT CHANGE UP (ref 0–0.2)
BASOPHILS NFR BLD AUTO: 1.8 % — SIGNIFICANT CHANGE UP (ref 0–2)
EOSINOPHIL # BLD AUTO: 0.1 K/UL — SIGNIFICANT CHANGE UP (ref 0–0.5)
EOSINOPHIL NFR BLD AUTO: 1.8 % — SIGNIFICANT CHANGE UP (ref 0–6)
HCT VFR BLD CALC: 29.3 % — LOW (ref 39–50)
HGB BLD-MCNC: 9.3 G/DL — LOW (ref 13–17)
LYMPHOCYTES # BLD AUTO: 0.7 K/UL — LOW (ref 1–3.3)
LYMPHOCYTES # BLD AUTO: 10 % — LOW (ref 13–44)
MCHC RBC-ENTMCNC: 28.9 PG — SIGNIFICANT CHANGE UP (ref 27–34)
MCHC RBC-ENTMCNC: 31.7 G/DL — LOW (ref 32–36)
MCV RBC AUTO: 91.3 FL — SIGNIFICANT CHANGE UP (ref 80–100)
MONOCYTES # BLD AUTO: 0.5 K/UL — SIGNIFICANT CHANGE UP (ref 0–0.9)
MONOCYTES NFR BLD AUTO: 7.1 % — SIGNIFICANT CHANGE UP (ref 2–14)
NEUTROPHILS # BLD AUTO: 5.4 K/UL — SIGNIFICANT CHANGE UP (ref 1.8–7.4)
NEUTROPHILS NFR BLD AUTO: 79.3 % — HIGH (ref 43–77)
PLATELET # BLD AUTO: 157 K/UL — SIGNIFICANT CHANGE UP (ref 150–400)
RBC # BLD: 3.21 M/UL — LOW (ref 4.2–5.8)
RBC # FLD: 14.6 % — HIGH (ref 10.3–14.5)
WBC # BLD: 6.8 K/UL — SIGNIFICANT CHANGE UP (ref 3.8–10.5)
WBC # FLD AUTO: 6.8 K/UL — SIGNIFICANT CHANGE UP (ref 3.8–10.5)

## 2019-07-21 ENCOUNTER — RX RENEWAL (OUTPATIENT)
Age: 84
End: 2019-07-21

## 2019-07-31 ENCOUNTER — APPOINTMENT (OUTPATIENT)
Dept: HEMATOLOGY ONCOLOGY | Facility: CLINIC | Age: 84
End: 2019-07-31
Payer: MEDICARE

## 2019-07-31 ENCOUNTER — RESULT REVIEW (OUTPATIENT)
Age: 84
End: 2019-07-31

## 2019-07-31 ENCOUNTER — APPOINTMENT (OUTPATIENT)
Age: 84
End: 2019-07-31

## 2019-07-31 VITALS
WEIGHT: 162.19 LBS | TEMPERATURE: 97.6 F | HEART RATE: 64 BPM | OXYGEN SATURATION: 99 % | HEIGHT: 65 IN | BODY MASS INDEX: 27.02 KG/M2 | DIASTOLIC BLOOD PRESSURE: 70 MMHG | SYSTOLIC BLOOD PRESSURE: 198 MMHG

## 2019-07-31 LAB
BASOPHILS # BLD AUTO: 0.1 K/UL — SIGNIFICANT CHANGE UP (ref 0–0.2)
BASOPHILS NFR BLD AUTO: 1.7 % — SIGNIFICANT CHANGE UP (ref 0–2)
EOSINOPHIL # BLD AUTO: 0.2 K/UL — SIGNIFICANT CHANGE UP (ref 0–0.5)
EOSINOPHIL NFR BLD AUTO: 3.1 % — SIGNIFICANT CHANGE UP (ref 0–6)
HCT VFR BLD CALC: 34 % — LOW (ref 39–50)
HGB BLD-MCNC: 10.6 G/DL — LOW (ref 13–17)
LYMPHOCYTES # BLD AUTO: 0.9 K/UL — LOW (ref 1–3.3)
LYMPHOCYTES # BLD AUTO: 14.2 % — SIGNIFICANT CHANGE UP (ref 13–44)
MCHC RBC-ENTMCNC: 28.6 PG — SIGNIFICANT CHANGE UP (ref 27–34)
MCHC RBC-ENTMCNC: 31 G/DL — LOW (ref 32–36)
MCV RBC AUTO: 92.5 FL — SIGNIFICANT CHANGE UP (ref 80–100)
MONOCYTES # BLD AUTO: 0.6 K/UL — SIGNIFICANT CHANGE UP (ref 0–0.9)
MONOCYTES NFR BLD AUTO: 9.8 % — SIGNIFICANT CHANGE UP (ref 2–14)
NEUTROPHILS # BLD AUTO: 4.6 K/UL — SIGNIFICANT CHANGE UP (ref 1.8–7.4)
NEUTROPHILS NFR BLD AUTO: 71.2 % — SIGNIFICANT CHANGE UP (ref 43–77)
PLATELET # BLD AUTO: 182 K/UL — SIGNIFICANT CHANGE UP (ref 150–400)
RBC # BLD: 3.68 M/UL — LOW (ref 4.2–5.8)
RBC # FLD: 15.8 % — HIGH (ref 10.3–14.5)
WBC # BLD: 6.5 K/UL — SIGNIFICANT CHANGE UP (ref 3.8–10.5)
WBC # FLD AUTO: 6.5 K/UL — SIGNIFICANT CHANGE UP (ref 3.8–10.5)

## 2019-07-31 PROCEDURE — 99213 OFFICE O/P EST LOW 20 MIN: CPT

## 2019-08-02 NOTE — HISTORY OF PRESENT ILLNESS
[de-identified] : Doing well, no excessive fatigue or signs of bleeding. Not sleeping well, but napping for 3-4 hours in the afternoon. Notes he missed dose of blood pressure medications yesterday.\par \par Today's CBC: \par \par WBC: 6.5, HGB: 10.6, RBC: 3.68, MCV: 92.5, HCT: 34.0%, PLT: 182\par \par Today's DIff: Lymphocyte 0.9 [de-identified] : Mr. King is a 83 yo WM with a long history of renal, disease, currently Stage 5, who has been treated conservatively, no dialysis, but who has renal related anemia, has been on procrit, but HGb has been ranging between 8.6 - 11. Was switched to Aranesp. \par

## 2019-08-02 NOTE — ASSESSMENT
[FreeTextEntry1] : Today's CBC: \par WBC: 6.5, HGB: 10.6, RBC: 3.68, MCV: 92.5, HCT: 34.0%, PLT: 182\par Today's DIff: Lymphocyte 0.9\par \par Plan:\par -Held Aranesp injection today due to elevated blood pressure.\par -Return to office for Aranesp injection and follow up in 2 weeks thereafter.\par

## 2019-08-05 ENCOUNTER — RESULT REVIEW (OUTPATIENT)
Age: 84
End: 2019-08-05

## 2019-08-05 ENCOUNTER — OUTPATIENT (OUTPATIENT)
Dept: OUTPATIENT SERVICES | Facility: HOSPITAL | Age: 84
LOS: 1 days | Discharge: HOME | End: 2019-08-05

## 2019-08-05 ENCOUNTER — APPOINTMENT (OUTPATIENT)
Age: 84
End: 2019-08-05

## 2019-08-05 DIAGNOSIS — D63.1 ANEMIA IN CHRONIC KIDNEY DISEASE: ICD-10-CM

## 2019-08-05 DIAGNOSIS — Z98.890 OTHER SPECIFIED POSTPROCEDURAL STATES: Chronic | ICD-10-CM

## 2019-08-05 DIAGNOSIS — Z98.62 PERIPHERAL VASCULAR ANGIOPLASTY STATUS: Chronic | ICD-10-CM

## 2019-08-05 DIAGNOSIS — I77.0 ARTERIOVENOUS FISTULA, ACQUIRED: Chronic | ICD-10-CM

## 2019-08-05 LAB
BASOPHILS # BLD AUTO: 0.1 K/UL — SIGNIFICANT CHANGE UP (ref 0–0.2)
BASOPHILS NFR BLD AUTO: 2 % — SIGNIFICANT CHANGE UP (ref 0–2)
EOSINOPHIL # BLD AUTO: 0.2 K/UL — SIGNIFICANT CHANGE UP (ref 0–0.5)
EOSINOPHIL NFR BLD AUTO: 2.2 % — SIGNIFICANT CHANGE UP (ref 0–6)
HCT VFR BLD CALC: 33.5 % — LOW (ref 39–50)
HGB BLD-MCNC: 10.5 G/DL — LOW (ref 13–17)
LYMPHOCYTES # BLD AUTO: 0.8 K/UL — LOW (ref 1–3.3)
LYMPHOCYTES # BLD AUTO: 10.4 % — LOW (ref 13–44)
MCHC RBC-ENTMCNC: 29 PG — SIGNIFICANT CHANGE UP (ref 27–34)
MCHC RBC-ENTMCNC: 31.4 G/DL — LOW (ref 32–36)
MCV RBC AUTO: 92.6 FL — SIGNIFICANT CHANGE UP (ref 80–100)
MONOCYTES # BLD AUTO: 0.5 K/UL — SIGNIFICANT CHANGE UP (ref 0–0.9)
MONOCYTES NFR BLD AUTO: 7.2 % — SIGNIFICANT CHANGE UP (ref 2–14)
NEUTROPHILS # BLD AUTO: 5.8 K/UL — SIGNIFICANT CHANGE UP (ref 1.8–7.4)
NEUTROPHILS NFR BLD AUTO: 78.2 % — HIGH (ref 43–77)
PLATELET # BLD AUTO: 205 K/UL — SIGNIFICANT CHANGE UP (ref 150–400)
RBC # BLD: 3.62 M/UL — LOW (ref 4.2–5.8)
RBC # FLD: 15.9 % — HIGH (ref 10.3–14.5)
WBC # BLD: 7.4 K/UL — SIGNIFICANT CHANGE UP (ref 3.8–10.5)
WBC # FLD AUTO: 7.4 K/UL — SIGNIFICANT CHANGE UP (ref 3.8–10.5)

## 2019-08-06 DIAGNOSIS — N18.5 CHRONIC KIDNEY DISEASE, STAGE 5: ICD-10-CM

## 2019-08-12 ENCOUNTER — APPOINTMENT (OUTPATIENT)
Dept: NEPHROLOGY | Facility: CLINIC | Age: 84
End: 2019-08-12
Payer: MEDICARE

## 2019-08-12 VITALS
SYSTOLIC BLOOD PRESSURE: 164 MMHG | WEIGHT: 158 LBS | HEART RATE: 67 BPM | DIASTOLIC BLOOD PRESSURE: 50 MMHG | HEIGHT: 65 IN | OXYGEN SATURATION: 97 % | BODY MASS INDEX: 26.33 KG/M2

## 2019-08-12 PROCEDURE — 36415 COLL VENOUS BLD VENIPUNCTURE: CPT

## 2019-08-12 PROCEDURE — 99215 OFFICE O/P EST HI 40 MIN: CPT | Mod: 25

## 2019-08-12 RX ORDER — TORSEMIDE 5 MG/1
5 TABLET ORAL
Refills: 0 | Status: DISCONTINUED | COMMUNITY
End: 2019-08-12

## 2019-08-12 RX ORDER — GLIPIZIDE 5 MG/1
5 TABLET ORAL
Refills: 0 | Status: DISCONTINUED | COMMUNITY
End: 2019-08-12

## 2019-08-12 NOTE — PHYSICAL EXAM
[General Appearance - Well Nourished] : well nourished [General Appearance - Alert] : alert [General Appearance - In No Acute Distress] : in no acute distress [General Appearance - Well Developed] : well developed [PERRL With Normal Accommodation] : pupils were equal in size, round, and reactive to light [Sclera] : the sclera and conjunctiva were normal [Strabismus] : no strabismus was seen [Extraocular Movements] : extraocular movements were intact [Retina Vessels Narrowing Of Arterioles] : narrowing of the arterioles noted [Outer Ear] : the ears and nose were normal in appearance [Hearing Threshold Finger Rub Not [place]] : hearing was normal [Examination Of The Oral Cavity] : the lips and gums were normal [Both Tympanic Membranes Were Examined] : both tympanic membranes were normal [Nasal Cavity] : the nasal mucosa and septum were normal [Oropharynx] : the oropharynx was normal [Neck Appearance] : the appearance of the neck was normal [Jugular Venous Distention Increased] : there was no jugular-venous distention [Neck Cervical Mass (___cm)] : no neck mass was observed [Neck Circumference: ___] : neck circumference is [unfilled] [Thyroid Nodule] : there were no palpable thyroid nodules [Thyroid Diffuse Enlargement] : the thyroid was not enlarged [Respiration, Rhythm And Depth] : normal respiratory rhythm and effort [Chest Palpation] : palpation of the chest revealed no abnormalities [Exaggerated Use Of Accessory Muscles For Inspiration] : no accessory muscle use [Lungs Percussion] : the lungs were normal to percussion [Scattered Wheezes] : scattered wheezing [Bibasilar Rales/Crackles] : bibasilar rales [Decreased Breath Sounds] : decreased breath sounds [Apical Impulse] : the apical impulse was normal [Heart Rate And Rhythm] : heart rate was normal and rhythm regular [Heart Sounds Gallop] : no gallops [Heart Sounds] : normal S1 and S2 [Heart Sounds Pericardial Friction Rub] : no pericardial rub [Murmurs] : no murmurs [Systolic grade ___/6] : A grade [unfilled]/6 systolic murmur was heard. [Arterial Pulses Carotid] : carotid pulses were normal with no bruits [Full Pulse] : the pedal pulses are present [Veins - Varicosity Changes] : there were no varicosital changes [Pitting Edema] : pitting edema present [___ +] : bilateral [unfilled]+ pitting edema to the ankles [Bowel Sounds] : normal bowel sounds [Abdomen Soft] : soft [Abdomen Tenderness] : non-tender [Abdomen Mass (___ Cm)] : no abdominal mass palpated [Abdomen Hernia] : no hernia was discovered [Cervical Lymph Nodes Enlarged Posterior Bilaterally] : posterior cervical [Cervical Lymph Nodes Enlarged Anterior Bilaterally] : anterior cervical [Supraclavicular Lymph Nodes Enlarged Bilaterally] : supraclavicular [Axillary Lymph Nodes Enlarged Bilaterally] : axillary [Femoral Lymph Nodes Enlarged Bilaterally] : femoral [Inguinal Lymph Nodes Enlarged Bilaterally] : inguinal [No CVA Tenderness] : no ~M costovertebral angle tenderness [No Spinal Tenderness] : no spinal tenderness [Abnormal Walk] : normal gait [Nail Clubbing] : no clubbing  or cyanosis of the fingernails [Involuntary Movements] : no involuntary movements were seen [Motor Tone] : muscle strength and tone were normal [___ (cm) Fistula] : [unfilled] (cm) fistula [Bruit] : a bruit was present [Thrill] : a thrill was present [Skin Color & Pigmentation] : normal skin color and pigmentation [Skin Turgor] : normal skin turgor [] : no rash [Skin Lesions] : no skin lesions [Right Foot Was Examined] : right foot was examined [Left Foot Was Examined] : left foot was examined [Normal] : normal [Normal in Appearance] : normal in appearance [Full ROM] : with full range of motion [Cranial Nerves] : cranial nerves 2-12 were intact [Deep Tendon Reflexes (DTR)] : deep tendon reflexes were 2+ and symmetric [Sensation] : the sensory exam was normal to light touch and pinprick [Motor Exam] : the motor exam was normal [No Focal Deficits] : no focal deficits [Impaired Insight] : insight and judgment were intact [Oriented To Time, Place, And Person] : oriented to person, place, and time [Mood] : the mood was normal [Affect] : the affect was normal [Memory Recent] : recent memory was not impaired [Memory Remote] : remote memory was not impaired [FreeTextEntry1] : ROM - R Shoulder limited,

## 2019-08-13 LAB
ALBUMIN SERPL ELPH-MCNC: 4.1 G/DL
ANION GAP SERPL CALC-SCNC: 16 MMOL/L
BUN SERPL-MCNC: 111 MG/DL
CALCIUM SERPL-MCNC: 9.9 MG/DL
CALCIUM SERPL-MCNC: 9.9 MG/DL
CHLORIDE SERPL-SCNC: 107 MMOL/L
CO2 SERPL-SCNC: 19 MMOL/L
CREAT SERPL-MCNC: 5.23 MG/DL
ESTIMATED AVERAGE GLUCOSE: 103 MG/DL
GLUCOSE SERPL-MCNC: 145 MG/DL
HBA1C MFR BLD HPLC: 5.2 %
PARATHYROID HORMONE INTACT: 60 PG/ML
PHOSPHATE SERPL-MCNC: 6.5 MG/DL
POTASSIUM SERPL-SCNC: 4.3 MMOL/L
SODIUM SERPL-SCNC: 142 MMOL/L

## 2019-08-14 ENCOUNTER — APPOINTMENT (OUTPATIENT)
Age: 84
End: 2019-08-14

## 2019-08-14 ENCOUNTER — RESULT REVIEW (OUTPATIENT)
Age: 84
End: 2019-08-14

## 2019-08-14 LAB
BASOPHILS # BLD AUTO: 0.1 K/UL — SIGNIFICANT CHANGE UP (ref 0–0.2)
BASOPHILS NFR BLD AUTO: 1.4 % — SIGNIFICANT CHANGE UP (ref 0–2)
EOSINOPHIL # BLD AUTO: 0.2 K/UL — SIGNIFICANT CHANGE UP (ref 0–0.5)
EOSINOPHIL NFR BLD AUTO: 2 % — SIGNIFICANT CHANGE UP (ref 0–6)
HCT VFR BLD CALC: 36.7 % — LOW (ref 39–50)
HGB BLD-MCNC: 11.5 G/DL — LOW (ref 13–17)
LYMPHOCYTES # BLD AUTO: 0.8 K/UL — LOW (ref 1–3.3)
LYMPHOCYTES # BLD AUTO: 10.7 % — LOW (ref 13–44)
MCHC RBC-ENTMCNC: 28.8 PG — SIGNIFICANT CHANGE UP (ref 27–34)
MCHC RBC-ENTMCNC: 31.3 G/DL — LOW (ref 32–36)
MCV RBC AUTO: 92.1 FL — SIGNIFICANT CHANGE UP (ref 80–100)
MONOCYTES # BLD AUTO: 0.8 K/UL — SIGNIFICANT CHANGE UP (ref 0–0.9)
MONOCYTES NFR BLD AUTO: 9.9 % — SIGNIFICANT CHANGE UP (ref 2–14)
NEUTROPHILS # BLD AUTO: 6 K/UL — SIGNIFICANT CHANGE UP (ref 1.8–7.4)
NEUTROPHILS NFR BLD AUTO: 76.1 % — SIGNIFICANT CHANGE UP (ref 43–77)
PLATELET # BLD AUTO: 251 K/UL — SIGNIFICANT CHANGE UP (ref 150–400)
RBC # BLD: 3.99 M/UL — LOW (ref 4.2–5.8)
RBC # FLD: 16.1 % — HIGH (ref 10.3–14.5)
WBC # BLD: 7.8 K/UL — SIGNIFICANT CHANGE UP (ref 3.8–10.5)
WBC # FLD AUTO: 7.8 K/UL — SIGNIFICANT CHANGE UP (ref 3.8–10.5)

## 2019-08-28 ENCOUNTER — RESULT REVIEW (OUTPATIENT)
Age: 84
End: 2019-08-28

## 2019-08-28 ENCOUNTER — APPOINTMENT (OUTPATIENT)
Age: 84
End: 2019-08-28

## 2019-08-28 LAB
BASOPHILS # BLD AUTO: 0.1 K/UL — SIGNIFICANT CHANGE UP (ref 0–0.2)
BASOPHILS NFR BLD AUTO: 1.6 % — SIGNIFICANT CHANGE UP (ref 0–2)
EOSINOPHIL # BLD AUTO: 0.2 K/UL — SIGNIFICANT CHANGE UP (ref 0–0.5)
EOSINOPHIL NFR BLD AUTO: 2.8 % — SIGNIFICANT CHANGE UP (ref 0–6)
HCT VFR BLD CALC: 27.7 % — LOW (ref 39–50)
HGB BLD-MCNC: 9 G/DL — LOW (ref 13–17)
LYMPHOCYTES # BLD AUTO: 0.8 K/UL — LOW (ref 1–3.3)
LYMPHOCYTES # BLD AUTO: 14 % — SIGNIFICANT CHANGE UP (ref 13–44)
MCHC RBC-ENTMCNC: 29.7 PG — SIGNIFICANT CHANGE UP (ref 27–34)
MCHC RBC-ENTMCNC: 32.3 G/DL — SIGNIFICANT CHANGE UP (ref 32–36)
MCV RBC AUTO: 91.9 FL — SIGNIFICANT CHANGE UP (ref 80–100)
MONOCYTES # BLD AUTO: 0.4 K/UL — SIGNIFICANT CHANGE UP (ref 0–0.9)
MONOCYTES NFR BLD AUTO: 7 % — SIGNIFICANT CHANGE UP (ref 2–14)
NEUTROPHILS # BLD AUTO: 4.2 K/UL — SIGNIFICANT CHANGE UP (ref 1.8–7.4)
NEUTROPHILS NFR BLD AUTO: 74.5 % — SIGNIFICANT CHANGE UP (ref 43–77)
PLATELET # BLD AUTO: 139 K/UL — LOW (ref 150–400)
RBC # BLD: 3.02 M/UL — LOW (ref 4.2–5.8)
RBC # FLD: 15.4 % — HIGH (ref 10.3–14.5)
WBC # BLD: 5.6 K/UL — SIGNIFICANT CHANGE UP (ref 3.8–10.5)
WBC # FLD AUTO: 5.6 K/UL — SIGNIFICANT CHANGE UP (ref 3.8–10.5)

## 2019-09-09 ENCOUNTER — OUTPATIENT (OUTPATIENT)
Dept: OUTPATIENT SERVICES | Facility: HOSPITAL | Age: 84
LOS: 1 days | Discharge: HOME | End: 2019-09-09

## 2019-09-09 DIAGNOSIS — Z98.62 PERIPHERAL VASCULAR ANGIOPLASTY STATUS: Chronic | ICD-10-CM

## 2019-09-09 DIAGNOSIS — Z98.890 OTHER SPECIFIED POSTPROCEDURAL STATES: Chronic | ICD-10-CM

## 2019-09-09 DIAGNOSIS — N18.5 CHRONIC KIDNEY DISEASE, STAGE 5: ICD-10-CM

## 2019-09-09 DIAGNOSIS — D63.1 ANEMIA IN CHRONIC KIDNEY DISEASE: ICD-10-CM

## 2019-09-09 DIAGNOSIS — I77.0 ARTERIOVENOUS FISTULA, ACQUIRED: Chronic | ICD-10-CM

## 2019-09-11 ENCOUNTER — APPOINTMENT (OUTPATIENT)
Age: 84
End: 2019-09-11

## 2019-09-11 ENCOUNTER — RESULT REVIEW (OUTPATIENT)
Age: 84
End: 2019-09-11

## 2019-09-11 LAB
BASOPHILS # BLD AUTO: 0.1 K/UL — SIGNIFICANT CHANGE UP (ref 0–0.2)
BASOPHILS NFR BLD AUTO: 1.6 % — SIGNIFICANT CHANGE UP (ref 0–2)
EOSINOPHIL # BLD AUTO: 0.2 K/UL — SIGNIFICANT CHANGE UP (ref 0–0.5)
EOSINOPHIL NFR BLD AUTO: 1.9 % — SIGNIFICANT CHANGE UP (ref 0–6)
HCT VFR BLD CALC: 28.8 % — LOW (ref 39–50)
HGB BLD-MCNC: 9.2 G/DL — LOW (ref 13–17)
LYMPHOCYTES # BLD AUTO: 0.9 K/UL — LOW (ref 1–3.3)
LYMPHOCYTES # BLD AUTO: 9 % — LOW (ref 13–44)
MCHC RBC-ENTMCNC: 29.4 PG — SIGNIFICANT CHANGE UP (ref 27–34)
MCHC RBC-ENTMCNC: 31.9 G/DL — LOW (ref 32–36)
MCV RBC AUTO: 92.3 FL — SIGNIFICANT CHANGE UP (ref 80–100)
MONOCYTES # BLD AUTO: 0.8 K/UL — SIGNIFICANT CHANGE UP (ref 0–0.9)
MONOCYTES NFR BLD AUTO: 8.4 % — SIGNIFICANT CHANGE UP (ref 2–14)
NEUTROPHILS # BLD AUTO: 7.5 K/UL — HIGH (ref 1.8–7.4)
NEUTROPHILS NFR BLD AUTO: 79.2 % — HIGH (ref 43–77)
PLATELET # BLD AUTO: 235 K/UL — SIGNIFICANT CHANGE UP (ref 150–400)
RBC # BLD: 3.12 M/UL — LOW (ref 4.2–5.8)
RBC # FLD: 15.5 % — HIGH (ref 10.3–14.5)
WBC # BLD: 9.5 K/UL — SIGNIFICANT CHANGE UP (ref 3.8–10.5)
WBC # FLD AUTO: 9.5 K/UL — SIGNIFICANT CHANGE UP (ref 3.8–10.5)

## 2019-09-16 ENCOUNTER — APPOINTMENT (OUTPATIENT)
Dept: NEPHROLOGY | Facility: CLINIC | Age: 84
End: 2019-09-16

## 2019-09-17 ENCOUNTER — APPOINTMENT (OUTPATIENT)
Dept: VASCULAR SURGERY | Facility: CLINIC | Age: 84
End: 2019-09-17
Payer: MEDICARE

## 2019-09-17 VITALS
SYSTOLIC BLOOD PRESSURE: 159 MMHG | WEIGHT: 160 LBS | BODY MASS INDEX: 26.66 KG/M2 | TEMPERATURE: 98 F | OXYGEN SATURATION: 98 % | DIASTOLIC BLOOD PRESSURE: 63 MMHG | HEIGHT: 65 IN | HEART RATE: 67 BPM | RESPIRATION RATE: 16 BRPM

## 2019-09-17 PROCEDURE — 93880 EXTRACRANIAL BILAT STUDY: CPT

## 2019-09-17 PROCEDURE — 99213 OFFICE O/P EST LOW 20 MIN: CPT

## 2019-09-19 ENCOUNTER — APPOINTMENT (OUTPATIENT)
Dept: INTERNAL MEDICINE | Facility: CLINIC | Age: 84
End: 2019-09-19
Payer: MEDICARE

## 2019-09-19 VITALS
DIASTOLIC BLOOD PRESSURE: 70 MMHG | BODY MASS INDEX: 26.66 KG/M2 | HEIGHT: 65 IN | WEIGHT: 160 LBS | SYSTOLIC BLOOD PRESSURE: 160 MMHG

## 2019-09-19 VITALS — SYSTOLIC BLOOD PRESSURE: 150 MMHG | DIASTOLIC BLOOD PRESSURE: 80 MMHG

## 2019-09-19 DIAGNOSIS — E78.5 HYPERLIPIDEMIA, UNSPECIFIED: ICD-10-CM

## 2019-09-19 PROCEDURE — 99214 OFFICE O/P EST MOD 30 MIN: CPT | Mod: 25

## 2019-09-19 PROCEDURE — 36415 COLL VENOUS BLD VENIPUNCTURE: CPT

## 2019-09-19 PROCEDURE — 90662 IIV NO PRSV INCREASED AG IM: CPT

## 2019-09-19 PROCEDURE — G0008: CPT

## 2019-09-19 NOTE — HISTORY OF PRESENT ILLNESS
[FreeTextEntry1] : check up on medical issues [de-identified] : PT HERE FOR FOLLOW UP FEELS OK PT HAS BEEN ANEMIC AND WAS SEEING DR ALMANZA [facility]  NOW  HEMATOLOGY I FEELS BETTER NO MAJOR COMLAINTS DOES HAVE SOB AT TIMES

## 2019-09-19 NOTE — PLAN
[FreeTextEntry1] : PT HERE FOR FOLLOW UP FEELS OK PT HAS BEEN ANEMIC AND WAS SEEING DR ALMANZA OF [facility]  NOW  HEMATOLOGY  FEELS BETTER NO MAJOR COMLAINTS DOES HAVE SOB AT TIMES\par HAS ANOT HAD LIPID PROFILE RECENTLY WILL CHECK LIPID PROFILE  OVERALL STABLE \par FLU SHOT GIVEN TODAY HIGH DOSE\par

## 2019-09-25 ENCOUNTER — APPOINTMENT (OUTPATIENT)
Age: 84
End: 2019-09-25

## 2019-09-25 ENCOUNTER — RESULT REVIEW (OUTPATIENT)
Age: 84
End: 2019-09-25

## 2019-09-25 LAB
BASOPHILS # BLD AUTO: 0.1 K/UL — SIGNIFICANT CHANGE UP (ref 0–0.2)
BASOPHILS NFR BLD AUTO: 1.7 % — SIGNIFICANT CHANGE UP (ref 0–2)
EOSINOPHIL # BLD AUTO: 0.2 K/UL — SIGNIFICANT CHANGE UP (ref 0–0.5)
EOSINOPHIL NFR BLD AUTO: 2.9 % — SIGNIFICANT CHANGE UP (ref 0–6)
HCT VFR BLD CALC: 31.3 % — LOW (ref 39–50)
HGB BLD-MCNC: 9.7 G/DL — LOW (ref 13–17)
LYMPHOCYTES # BLD AUTO: 0.8 K/UL — LOW (ref 1–3.3)
LYMPHOCYTES # BLD AUTO: 11.4 % — LOW (ref 13–44)
MCHC RBC-ENTMCNC: 28.8 PG — SIGNIFICANT CHANGE UP (ref 27–34)
MCHC RBC-ENTMCNC: 30.9 G/DL — LOW (ref 32–36)
MCV RBC AUTO: 93.2 FL — SIGNIFICANT CHANGE UP (ref 80–100)
MONOCYTES # BLD AUTO: 0.7 K/UL — SIGNIFICANT CHANGE UP (ref 0–0.9)
MONOCYTES NFR BLD AUTO: 10 % — SIGNIFICANT CHANGE UP (ref 2–14)
NEUTROPHILS # BLD AUTO: 5.2 K/UL — SIGNIFICANT CHANGE UP (ref 1.8–7.4)
NEUTROPHILS NFR BLD AUTO: 74 % — SIGNIFICANT CHANGE UP (ref 43–77)
PLATELET # BLD AUTO: 192 K/UL — SIGNIFICANT CHANGE UP (ref 150–400)
RBC # BLD: 3.36 M/UL — LOW (ref 4.2–5.8)
RBC # FLD: 15.6 % — HIGH (ref 10.3–14.5)
WBC # BLD: 7 K/UL — SIGNIFICANT CHANGE UP (ref 3.8–10.5)
WBC # FLD AUTO: 7 K/UL — SIGNIFICANT CHANGE UP (ref 3.8–10.5)

## 2019-10-04 ENCOUNTER — APPOINTMENT (OUTPATIENT)
Dept: NEPHROLOGY | Facility: CLINIC | Age: 84
End: 2019-10-04
Payer: MEDICARE

## 2019-10-04 VITALS
HEIGHT: 65 IN | HEART RATE: 60 BPM | DIASTOLIC BLOOD PRESSURE: 68 MMHG | SYSTOLIC BLOOD PRESSURE: 130 MMHG | WEIGHT: 162 LBS | BODY MASS INDEX: 26.99 KG/M2

## 2019-10-04 LAB
BILIRUB UR QL STRIP: NORMAL
GLUCOSE UR-MCNC: NORMAL
HCG UR QL: 0.2 EU/DL
HGB UR QL STRIP.AUTO: NORMAL
KETONES UR-MCNC: NORMAL
LEUKOCYTE ESTERASE UR QL STRIP: NORMAL
NITRITE UR QL STRIP: NORMAL
PH UR STRIP: 5
PROT UR STRIP-MCNC: 100
SP GR UR STRIP: 1.01

## 2019-10-04 PROCEDURE — 36415 COLL VENOUS BLD VENIPUNCTURE: CPT

## 2019-10-04 PROCEDURE — 99215 OFFICE O/P EST HI 40 MIN: CPT | Mod: 25

## 2019-10-04 PROCEDURE — 81003 URINALYSIS AUTO W/O SCOPE: CPT | Mod: QW

## 2019-10-04 NOTE — HISTORY OF PRESENT ILLNESS
[Adding Medication ___] : adding [unfilled] [___ Month(s) Ago] : [unfilled] month(s) ago [Stopping Medication ___] : stopping [unfilled] [Ordering Test(s) ___] : ordering [unfilled] [None] : ~He/She~ has no significant interval events [Stage 4] : stage 4 [Diabetic Nephropathy] : diabetic nephropathy [Hypertensive Nephropathy] : hypertensive nephropathy [Nocturia] : nocturia [Renal Diet] : renal diet [Edema] : edema [Diabetes Management] : diabetes management [Vitamin D] : vitamin D [Good Compliance] : good compliance  [Renal Vitamins] : renal vitamins [Good Tolerance] : good tolerance  [Good Symptom Control] : good symptom control [FreeTextEntry1] : DM - 2 ~ > 25 years, HX., of Cardiac arrhythmias ( V.Bigeminy )\par \par HTN X ~ 10 years ; \par \par R - S/P CEA,\par \par Compliant w. Diet , \par \par Declined early initiation of HD , AVF +, On  Q 2 weekly Aranesp ( Hematology )\par \par US - Open RCA, Moderate Stenosis - L Carotid,

## 2019-10-04 NOTE — PHYSICAL EXAM
[General Appearance - Alert] : alert [General Appearance - In No Acute Distress] : in no acute distress [General Appearance - Well Nourished] : well nourished [General Appearance - Well Developed] : well developed [PERRL With Normal Accommodation] : pupils were equal in size, round, and reactive to light [Sclera] : the sclera and conjunctiva were normal [Strabismus] : no strabismus was seen [Extraocular Movements] : extraocular movements were intact [Retina Vessels Narrowing Of Arterioles] : narrowing of the arterioles noted [Hearing Threshold Finger Rub Not [place]] : hearing was normal [Outer Ear] : the ears and nose were normal in appearance [Both Tympanic Membranes Were Examined] : both tympanic membranes were normal [Nasal Cavity] : the nasal mucosa and septum were normal [Examination Of The Oral Cavity] : the lips and gums were normal [Neck Appearance] : the appearance of the neck was normal [Oropharynx] : the oropharynx was normal [Jugular Venous Distention Increased] : there was no jugular-venous distention [Neck Cervical Mass (___cm)] : no neck mass was observed [Thyroid Nodule] : there were no palpable thyroid nodules [Thyroid Diffuse Enlargement] : the thyroid was not enlarged [Neck Circumference: ___] : neck circumference is [unfilled] [Exaggerated Use Of Accessory Muscles For Inspiration] : no accessory muscle use [Respiration, Rhythm And Depth] : normal respiratory rhythm and effort [Chest Palpation] : palpation of the chest revealed no abnormalities [Scattered Wheezes] : scattered wheezing [Lungs Percussion] : the lungs were normal to percussion [Bibasilar Rales/Crackles] : bibasilar rales [Decreased Breath Sounds] : decreased breath sounds [Apical Impulse] : the apical impulse was normal [Heart Rate And Rhythm] : heart rate was normal and rhythm regular [Heart Sounds] : normal S1 and S2 [Heart Sounds Gallop] : no gallops [Murmurs] : no murmurs [Systolic grade ___/6] : A grade [unfilled]/6 systolic murmur was heard. [Heart Sounds Pericardial Friction Rub] : no pericardial rub [Arterial Pulses Carotid] : carotid pulses were normal with no bruits [Full Pulse] : the pedal pulses are present [Veins - Varicosity Changes] : there were no varicosital changes [___ +] : bilateral [unfilled]+ pitting edema to the ankles [Pitting Edema] : pitting edema present [Bowel Sounds] : normal bowel sounds [Abdomen Soft] : soft [Abdomen Tenderness] : non-tender [Abdomen Hernia] : no hernia was discovered [Abdomen Mass (___ Cm)] : no abdominal mass palpated [Cervical Lymph Nodes Enlarged Posterior Bilaterally] : posterior cervical [Supraclavicular Lymph Nodes Enlarged Bilaterally] : supraclavicular [Cervical Lymph Nodes Enlarged Anterior Bilaterally] : anterior cervical [Axillary Lymph Nodes Enlarged Bilaterally] : axillary [Femoral Lymph Nodes Enlarged Bilaterally] : femoral [No CVA Tenderness] : no ~M costovertebral angle tenderness [Inguinal Lymph Nodes Enlarged Bilaterally] : inguinal [No Spinal Tenderness] : no spinal tenderness [Nail Clubbing] : no clubbing  or cyanosis of the fingernails [Involuntary Movements] : no involuntary movements were seen [Abnormal Walk] : normal gait [Motor Tone] : muscle strength and tone were normal [___ (cm) Fistula] : [unfilled] (cm) fistula [Bruit] : a bruit was present [Thrill] : a thrill was present [Skin Color & Pigmentation] : normal skin color and pigmentation [Skin Turgor] : normal skin turgor [] : no rash [Skin Lesions] : no skin lesions [Left Foot Was Examined] : left foot was examined [Right Foot Was Examined] : right foot was examined [Normal] : normal [Normal in Appearance] : normal in appearance [Full ROM] : with full range of motion [Cranial Nerves] : cranial nerves 2-12 were intact [Deep Tendon Reflexes (DTR)] : deep tendon reflexes were 2+ and symmetric [Motor Exam] : the motor exam was normal [Sensation] : the sensory exam was normal to light touch and pinprick [No Focal Deficits] : no focal deficits [Oriented To Time, Place, And Person] : oriented to person, place, and time [Impaired Insight] : insight and judgment were intact [Affect] : the affect was normal [Memory Recent] : recent memory was not impaired [Mood] : the mood was normal [Memory Remote] : remote memory was not impaired [FreeTextEntry1] : Distended , ? Mild Ascites

## 2019-10-04 NOTE — ASSESSMENT
[FreeTextEntry1] : 83 YO  M - with moderate Bilateral carotid stenoses , right > Left  , DM - 2 , HTN . 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 edema left more so than right 2+ :\par \par On Low K+ Diet,\par \par Hospitalization for ADHF & now s/p AVF , [facility] RN involved,\par \par S/P R - Carotid Surgery in Oct. 12 - 2017,\par \par Continue AMY & HD when not able to manage conservatively,\par \par Meds & Labs Reviewed, \par \par AVF Maturing,\par \par Uses Cane  or a Walker, \par \par Frail, Debilitated, Uremic, \par \par CBC, RP & Urine C/S, \par \par \par

## 2019-10-06 LAB
ALBUMIN SERPL ELPH-MCNC: 3.8 G/DL
ANION GAP SERPL CALC-SCNC: 18 MMOL/L
BACTERIA UR CULT: NORMAL
BASOPHILS # BLD AUTO: 0.11 K/UL
BASOPHILS NFR BLD AUTO: 1 %
BUN SERPL-MCNC: 99 MG/DL
CALCIUM SERPL-MCNC: 8.8 MG/DL
CHLORIDE SERPL-SCNC: 109 MMOL/L
CO2 SERPL-SCNC: 15 MMOL/L
CREAT SERPL-MCNC: 4.77 MG/DL
EOSINOPHIL # BLD AUTO: 0.2 K/UL
EOSINOPHIL NFR BLD AUTO: 1.8 %
GLUCOSE SERPL-MCNC: 128 MG/DL
HCT VFR BLD CALC: 36.9 %
HGB BLD-MCNC: 10.4 G/DL
IMM GRANULOCYTES NFR BLD AUTO: 0.4 %
LYMPHOCYTES # BLD AUTO: 0.72 K/UL
LYMPHOCYTES NFR BLD AUTO: 6.3 %
MAN DIFF?: NORMAL
MCHC RBC-ENTMCNC: 28.2 GM/DL
MCHC RBC-ENTMCNC: 28.7 PG
MCV RBC AUTO: 101.7 FL
MONOCYTES # BLD AUTO: 1.03 K/UL
MONOCYTES NFR BLD AUTO: 9 %
NEUTROPHILS # BLD AUTO: 9.3 K/UL
NEUTROPHILS NFR BLD AUTO: 81.5 %
PHOSPHATE SERPL-MCNC: 5.4 MG/DL
PLATELET # BLD AUTO: 244 K/UL
POTASSIUM SERPL-SCNC: 4.4 MMOL/L
RBC # BLD: 3.63 M/UL
RBC # FLD: 15.9 %
SODIUM SERPL-SCNC: 142 MMOL/L
WBC # FLD AUTO: 11.41 K/UL

## 2019-10-17 ENCOUNTER — OUTPATIENT (OUTPATIENT)
Dept: OUTPATIENT SERVICES | Facility: HOSPITAL | Age: 84
LOS: 1 days | Discharge: HOME | End: 2019-10-17

## 2019-10-17 DIAGNOSIS — Z98.62 PERIPHERAL VASCULAR ANGIOPLASTY STATUS: Chronic | ICD-10-CM

## 2019-10-17 DIAGNOSIS — Z98.890 OTHER SPECIFIED POSTPROCEDURAL STATES: Chronic | ICD-10-CM

## 2019-10-17 DIAGNOSIS — D63.1 ANEMIA IN CHRONIC KIDNEY DISEASE: ICD-10-CM

## 2019-10-17 DIAGNOSIS — I77.0 ARTERIOVENOUS FISTULA, ACQUIRED: Chronic | ICD-10-CM

## 2019-10-17 DIAGNOSIS — N18.5 CHRONIC KIDNEY DISEASE, STAGE 5: ICD-10-CM

## 2019-10-23 ENCOUNTER — APPOINTMENT (OUTPATIENT)
Dept: HEMATOLOGY ONCOLOGY | Facility: CLINIC | Age: 84
End: 2019-10-23
Payer: MEDICARE

## 2019-10-23 ENCOUNTER — MED ADMIN CHARGE (OUTPATIENT)
Age: 84
End: 2019-10-23

## 2019-10-23 ENCOUNTER — RESULT REVIEW (OUTPATIENT)
Age: 84
End: 2019-10-23

## 2019-10-23 ENCOUNTER — APPOINTMENT (OUTPATIENT)
Age: 84
End: 2019-10-23

## 2019-10-23 VITALS
HEART RATE: 67 BPM | TEMPERATURE: 97.8 F | WEIGHT: 159.03 LBS | OXYGEN SATURATION: 98 % | SYSTOLIC BLOOD PRESSURE: 176 MMHG | BODY MASS INDEX: 26.49 KG/M2 | DIASTOLIC BLOOD PRESSURE: 84 MMHG | HEIGHT: 65 IN

## 2019-10-23 LAB
BASOPHILS # BLD AUTO: 0.1 K/UL — SIGNIFICANT CHANGE UP (ref 0–0.2)
BASOPHILS NFR BLD AUTO: 1.7 % — SIGNIFICANT CHANGE UP (ref 0–2)
EOSINOPHIL # BLD AUTO: 0.4 K/UL — SIGNIFICANT CHANGE UP (ref 0–0.5)
EOSINOPHIL NFR BLD AUTO: 4.7 % — SIGNIFICANT CHANGE UP (ref 0–6)
HCT VFR BLD CALC: 26.4 % — LOW (ref 39–50)
HGB BLD-MCNC: 8.5 G/DL — LOW (ref 13–17)
LYMPHOCYTES # BLD AUTO: 1.1 K/UL — SIGNIFICANT CHANGE UP (ref 1–3.3)
LYMPHOCYTES # BLD AUTO: 13.6 % — SIGNIFICANT CHANGE UP (ref 13–44)
MCHC RBC-ENTMCNC: 28.7 PG — SIGNIFICANT CHANGE UP (ref 27–34)
MCHC RBC-ENTMCNC: 32.3 G/DL — SIGNIFICANT CHANGE UP (ref 32–36)
MCV RBC AUTO: 88.9 FL — SIGNIFICANT CHANGE UP (ref 80–100)
MONOCYTES # BLD AUTO: 0.6 K/UL — SIGNIFICANT CHANGE UP (ref 0–0.9)
MONOCYTES NFR BLD AUTO: 7.6 % — SIGNIFICANT CHANGE UP (ref 2–14)
NEUTROPHILS # BLD AUTO: 5.6 K/UL — SIGNIFICANT CHANGE UP (ref 1.8–7.4)
NEUTROPHILS NFR BLD AUTO: 72.3 % — SIGNIFICANT CHANGE UP (ref 43–77)
PLATELET # BLD AUTO: 224 K/UL — SIGNIFICANT CHANGE UP (ref 150–400)
RBC # BLD: 2.96 M/UL — LOW (ref 4.2–5.8)
RBC # FLD: 13.8 % — SIGNIFICANT CHANGE UP (ref 10.3–14.5)
WBC # BLD: 7.7 K/UL — SIGNIFICANT CHANGE UP (ref 3.8–10.5)
WBC # FLD AUTO: 7.7 K/UL — SIGNIFICANT CHANGE UP (ref 3.8–10.5)

## 2019-10-23 PROCEDURE — 99213 OFFICE O/P EST LOW 20 MIN: CPT

## 2019-10-23 NOTE — ASSESSMENT
[FreeTextEntry1] : Anemia of renal disease\par Today's CBC: \par WBC: 7.7, HGB: 8.5, RBC: 2.96, MCV: 88.9, HCT: 26.4%, PLT: 224\par \par Plan:\par -Aranesp injection today\par -Return to office for Aranesp injection every 2 weeks\par

## 2019-10-23 NOTE — ADDENDUM
[FreeTextEntry1] : I, Rafael Alfaro, solely acted as scribe for Dr. Stephon Baker on 10/23/2019.\par

## 2019-10-23 NOTE — REVIEW OF SYSTEMS
[Negative] : Allergic/Immunologic [Constipation] : constipation [Insomnia] : insomnia [Fatigue] : fatigue

## 2019-10-23 NOTE — HISTORY OF PRESENT ILLNESS
[de-identified] : Mr. King is a 86 yo WM with a long history of renal, disease, currently Stage 5, who has been treated conservatively, no dialysis, but who has renal related anemia, has been on procrit, but HGb has been ranging between 8.6 - 11. Was switched to Aranesp. \par  [de-identified] : Doing well today. \par States he is still not sleeping well and is constipated.\par \par Today's CBC: \par WBC: 7.7, HGB: 8.5, RBC: 2.96, MCV: 88.9, HCT: 26.4%, PLT: 224k\par \par

## 2019-10-24 ENCOUNTER — APPOINTMENT (OUTPATIENT)
Age: 84
End: 2019-10-24

## 2019-10-24 LAB
ALBUMIN SERPL ELPH-MCNC: 4.2 G/DL
ALP BLD-CCNC: 103 U/L
ALT SERPL-CCNC: 59 U/L
ANION GAP SERPL CALC-SCNC: 20 MMOL/L
AST SERPL-CCNC: 30 U/L
BILIRUB SERPL-MCNC: 0.2 MG/DL
BUN SERPL-MCNC: >120 MG/DL
CALCIUM SERPL-MCNC: 9.7 MG/DL
CHLORIDE SERPL-SCNC: 109 MMOL/L
CO2 SERPL-SCNC: 15 MMOL/L
CREAT SERPL-MCNC: 5.19 MG/DL
FERRITIN SERPL-MCNC: 80 NG/ML
GLUCOSE SERPL-MCNC: 131 MG/DL
IRON SATN MFR SERPL: 27 %
IRON SERPL-MCNC: 84 UG/DL
POTASSIUM SERPL-SCNC: 4.2 MMOL/L
PROT SERPL-MCNC: 6.5 G/DL
SODIUM SERPL-SCNC: 144 MMOL/L
TIBC SERPL-MCNC: 307 UG/DL
UIBC SERPL-MCNC: 223 UG/DL

## 2019-10-27 LAB
ALBUMIN SERPL ELPH-MCNC: 4.1 G/DL
ALP BLD-CCNC: 95 U/L
ALT SERPL-CCNC: 42 U/L
ANION GAP SERPL CALC-SCNC: 19 MMOL/L
AST SERPL-CCNC: 31 U/L
BILIRUB SERPL-MCNC: 0.3 MG/DL
BUN SERPL-MCNC: 109 MG/DL
CALCIUM SERPL-MCNC: 9.4 MG/DL
CHLORIDE SERPL-SCNC: 105 MMOL/L
CO2 SERPL-SCNC: 16 MMOL/L
CREAT SERPL-MCNC: 5.07 MG/DL
GLUCOSE SERPL-MCNC: 115 MG/DL
POTASSIUM SERPL-SCNC: 4.4 MMOL/L
PROT SERPL-MCNC: 6.5 G/DL
SODIUM SERPL-SCNC: 140 MMOL/L

## 2019-11-07 ENCOUNTER — RESULT REVIEW (OUTPATIENT)
Age: 84
End: 2019-11-07

## 2019-11-07 ENCOUNTER — INPATIENT (INPATIENT)
Facility: HOSPITAL | Age: 84
LOS: 5 days | Discharge: HOME | DRG: 394 | End: 2019-11-13
Attending: INTERNAL MEDICINE | Admitting: INTERNAL MEDICINE
Payer: MEDICARE

## 2019-11-07 ENCOUNTER — APPOINTMENT (OUTPATIENT)
Age: 84
End: 2019-11-07

## 2019-11-07 VITALS
OXYGEN SATURATION: 96 % | WEIGHT: 154.98 LBS | RESPIRATION RATE: 18 BRPM | TEMPERATURE: 98 F | HEIGHT: 64 IN | DIASTOLIC BLOOD PRESSURE: 53 MMHG | SYSTOLIC BLOOD PRESSURE: 148 MMHG | HEART RATE: 74 BPM

## 2019-11-07 DIAGNOSIS — Z98.890 OTHER SPECIFIED POSTPROCEDURAL STATES: Chronic | ICD-10-CM

## 2019-11-07 DIAGNOSIS — I77.0 ARTERIOVENOUS FISTULA, ACQUIRED: Chronic | ICD-10-CM

## 2019-11-07 DIAGNOSIS — Z98.62 PERIPHERAL VASCULAR ANGIOPLASTY STATUS: Chronic | ICD-10-CM

## 2019-11-07 DIAGNOSIS — D64.9 ANEMIA, UNSPECIFIED: ICD-10-CM

## 2019-11-07 LAB
ALBUMIN SERPL ELPH-MCNC: 3.7 G/DL — SIGNIFICANT CHANGE UP (ref 3.3–5.2)
ALP SERPL-CCNC: 88 U/L — SIGNIFICANT CHANGE UP (ref 40–120)
ALT FLD-CCNC: 26 U/L — SIGNIFICANT CHANGE UP
ANION GAP SERPL CALC-SCNC: 21 MMOL/L — HIGH (ref 5–17)
ANISOCYTOSIS BLD QL: SLIGHT — SIGNIFICANT CHANGE UP
APTT BLD: 35 SEC — SIGNIFICANT CHANGE UP (ref 27.5–36.3)
AST SERPL-CCNC: 18 U/L — SIGNIFICANT CHANGE UP
BASOPHILS # BLD AUTO: 0.09 K/UL — SIGNIFICANT CHANGE UP (ref 0–0.2)
BASOPHILS # BLD AUTO: 0.2 K/UL — SIGNIFICANT CHANGE UP (ref 0–0.2)
BASOPHILS NFR BLD AUTO: 1.1 % — SIGNIFICANT CHANGE UP (ref 0–2)
BASOPHILS NFR BLD AUTO: 2 % — SIGNIFICANT CHANGE UP (ref 0–2)
BILIRUB SERPL-MCNC: 0.3 MG/DL — LOW (ref 0.4–2)
BLD GP AB SCN SERPL QL: SIGNIFICANT CHANGE UP
BUN SERPL-MCNC: 102 MG/DL — HIGH (ref 8–20)
CALCIUM SERPL-MCNC: 9.2 MG/DL — SIGNIFICANT CHANGE UP (ref 8.6–10.2)
CHLORIDE SERPL-SCNC: 101 MMOL/L — SIGNIFICANT CHANGE UP (ref 98–107)
CO2 SERPL-SCNC: 16 MMOL/L — LOW (ref 22–29)
CREAT SERPL-MCNC: 4.74 MG/DL — HIGH (ref 0.5–1.3)
DACRYOCYTES BLD QL SMEAR: SLIGHT — SIGNIFICANT CHANGE UP
ELLIPTOCYTES BLD QL SMEAR: SLIGHT — SIGNIFICANT CHANGE UP
EOSINOPHIL # BLD AUTO: 0.3 K/UL — SIGNIFICANT CHANGE UP (ref 0–0.5)
EOSINOPHIL # BLD AUTO: 0.31 K/UL — SIGNIFICANT CHANGE UP (ref 0–0.5)
EOSINOPHIL NFR BLD AUTO: 3.9 % — SIGNIFICANT CHANGE UP (ref 0–6)
EOSINOPHIL NFR BLD AUTO: 4.2 % — SIGNIFICANT CHANGE UP (ref 0–6)
GLUCOSE SERPL-MCNC: 205 MG/DL — HIGH (ref 70–115)
HCT VFR BLD CALC: 22.9 % — LOW (ref 39–50)
HCT VFR BLD CALC: 23.4 % — LOW (ref 39–50)
HGB BLD-MCNC: 6.7 G/DL — CRITICAL LOW (ref 13–17)
HGB BLD-MCNC: 7.2 G/DL — LOW (ref 13–17)
IMM GRANULOCYTES NFR BLD AUTO: 0.4 % — SIGNIFICANT CHANGE UP (ref 0–1.5)
INR BLD: 1.01 RATIO — SIGNIFICANT CHANGE UP (ref 0.88–1.16)
LYMPHOCYTES # BLD AUTO: 0.7 K/UL — LOW (ref 1–3.3)
LYMPHOCYTES # BLD AUTO: 0.83 K/UL — LOW (ref 1–3.3)
LYMPHOCYTES # BLD AUTO: 10.4 % — LOW (ref 13–44)
LYMPHOCYTES # BLD AUTO: 9.4 % — LOW (ref 13–44)
MACROCYTES BLD QL: SLIGHT — SIGNIFICANT CHANGE UP
MANUAL SMEAR VERIFICATION: SIGNIFICANT CHANGE UP
MCHC RBC-ENTMCNC: 28.3 PG — SIGNIFICANT CHANGE UP (ref 27–34)
MCHC RBC-ENTMCNC: 28.6 GM/DL — LOW (ref 32–36)
MCHC RBC-ENTMCNC: 29.2 PG — SIGNIFICANT CHANGE UP (ref 27–34)
MCHC RBC-ENTMCNC: 31.2 G/DL — LOW (ref 32–36)
MCV RBC AUTO: 93.5 FL — SIGNIFICANT CHANGE UP (ref 80–100)
MCV RBC AUTO: 98.7 FL — SIGNIFICANT CHANGE UP (ref 80–100)
MICROCYTES BLD QL: SLIGHT — SIGNIFICANT CHANGE UP
MONOCYTES # BLD AUTO: 0.6 K/UL — SIGNIFICANT CHANGE UP (ref 0–0.9)
MONOCYTES # BLD AUTO: 0.75 K/UL — SIGNIFICANT CHANGE UP (ref 0–0.9)
MONOCYTES NFR BLD AUTO: 7.8 % — SIGNIFICANT CHANGE UP (ref 2–14)
MONOCYTES NFR BLD AUTO: 9.4 % — SIGNIFICANT CHANGE UP (ref 2–14)
NEUTROPHILS # BLD AUTO: 5.8 K/UL — SIGNIFICANT CHANGE UP (ref 1.8–7.4)
NEUTROPHILS # BLD AUTO: 5.99 K/UL — SIGNIFICANT CHANGE UP (ref 1.8–7.4)
NEUTROPHILS NFR BLD AUTO: 74.8 % — SIGNIFICANT CHANGE UP (ref 43–77)
NEUTROPHILS NFR BLD AUTO: 76.5 % — SIGNIFICANT CHANGE UP (ref 43–77)
NT-PROBNP SERPL-SCNC: 7426 PG/ML — HIGH (ref 0–300)
OB PNL STL: POSITIVE
OVALOCYTES BLD QL SMEAR: SLIGHT — SIGNIFICANT CHANGE UP
PLAT MORPH BLD: NORMAL — SIGNIFICANT CHANGE UP
PLATELET # BLD AUTO: 205 K/UL — SIGNIFICANT CHANGE UP (ref 150–400)
PLATELET # BLD AUTO: 215 K/UL — SIGNIFICANT CHANGE UP (ref 150–400)
POIKILOCYTOSIS BLD QL AUTO: SLIGHT — SIGNIFICANT CHANGE UP
POLYCHROMASIA BLD QL SMEAR: SLIGHT — SIGNIFICANT CHANGE UP
POTASSIUM SERPL-MCNC: 4.4 MMOL/L — SIGNIFICANT CHANGE UP (ref 3.5–5.3)
POTASSIUM SERPL-SCNC: 4.4 MMOL/L — SIGNIFICANT CHANGE UP (ref 3.5–5.3)
PROT SERPL-MCNC: 6.5 G/DL — LOW (ref 6.6–8.7)
PROTHROM AB SERPL-ACNC: 11.6 SEC — SIGNIFICANT CHANGE UP (ref 10–12.9)
RBC # BLD: 2.37 M/UL — LOW (ref 4.2–5.8)
RBC # BLD: 2.45 M/UL — LOW (ref 4.2–5.8)
RBC # FLD: 17.2 % — HIGH (ref 10.3–14.5)
RBC # FLD: 17.5 % — HIGH (ref 10.3–14.5)
RBC BLD AUTO: ABNORMAL
SCHISTOCYTES BLD QL AUTO: SLIGHT — SIGNIFICANT CHANGE UP
SODIUM SERPL-SCNC: 138 MMOL/L — SIGNIFICANT CHANGE UP (ref 135–145)
TARGETS BLD QL SMEAR: SLIGHT — SIGNIFICANT CHANGE UP
WBC # BLD: 7.6 K/UL — SIGNIFICANT CHANGE UP (ref 3.8–10.5)
WBC # BLD: 8 K/UL — SIGNIFICANT CHANGE UP (ref 3.8–10.5)
WBC # FLD AUTO: 7.6 K/UL — SIGNIFICANT CHANGE UP (ref 3.8–10.5)
WBC # FLD AUTO: 8 K/UL — SIGNIFICANT CHANGE UP (ref 3.8–10.5)

## 2019-11-07 PROCEDURE — 88342 IMHCHEM/IMCYTCHM 1ST ANTB: CPT | Mod: 26

## 2019-11-07 PROCEDURE — 99223 1ST HOSP IP/OBS HIGH 75: CPT

## 2019-11-07 PROCEDURE — 88305 TISSUE EXAM BY PATHOLOGIST: CPT | Mod: 26

## 2019-11-07 PROCEDURE — 99285 EMERGENCY DEPT VISIT HI MDM: CPT

## 2019-11-07 PROCEDURE — 71045 X-RAY EXAM CHEST 1 VIEW: CPT | Mod: 26

## 2019-11-07 PROCEDURE — 93010 ELECTROCARDIOGRAM REPORT: CPT

## 2019-11-07 RX ORDER — ASCORBIC ACID 60 MG
500 TABLET,CHEWABLE ORAL DAILY
Refills: 0 | Status: DISCONTINUED | OUTPATIENT
Start: 2019-11-07 | End: 2019-11-13

## 2019-11-07 RX ORDER — FLECAINIDE ACETATE 50 MG
100 TABLET ORAL
Refills: 0 | Status: DISCONTINUED | OUTPATIENT
Start: 2019-11-07 | End: 2019-11-13

## 2019-11-07 RX ORDER — CALCITRIOL 0.5 UG/1
0.25 CAPSULE ORAL DAILY
Refills: 0 | Status: DISCONTINUED | OUTPATIENT
Start: 2019-11-07 | End: 2019-11-13

## 2019-11-07 RX ORDER — ATORVASTATIN CALCIUM 80 MG/1
40 TABLET, FILM COATED ORAL AT BEDTIME
Refills: 0 | Status: DISCONTINUED | OUTPATIENT
Start: 2019-11-07 | End: 2019-11-13

## 2019-11-07 RX ORDER — FERROUS SULFATE 325(65) MG
325 TABLET ORAL DAILY
Refills: 0 | Status: DISCONTINUED | OUTPATIENT
Start: 2019-11-07 | End: 2019-11-10

## 2019-11-07 RX ORDER — FUROSEMIDE 40 MG
20 TABLET ORAL ONCE
Refills: 0 | Status: COMPLETED | OUTPATIENT
Start: 2019-11-07 | End: 2019-11-07

## 2019-11-07 RX ORDER — HYDRALAZINE HCL 50 MG
100 TABLET ORAL THREE TIMES A DAY
Refills: 0 | Status: DISCONTINUED | OUTPATIENT
Start: 2019-11-07 | End: 2019-11-11

## 2019-11-07 RX ADMIN — Medication 100 MILLIGRAM(S): at 23:08

## 2019-11-07 RX ADMIN — Medication 20 MILLIGRAM(S): at 23:08

## 2019-11-07 NOTE — H&P ADULT - NSICDXPASTSURGICALHX_GEN_ALL_CORE_FT
PAST SURGICAL HISTORY:  A-V fistula left arm 5/2017    H/O angioplasty 2013,  no  intervention    H/O carotid endarterectomy Right    H/O circumcision at  age  65    H/O left knee surgery

## 2019-11-07 NOTE — ED STATDOCS - CARE PLAN
Principal Discharge DX:	RICHARDS (dyspnea on exertion) Principal Discharge DX:	Symptomatic anemia  Secondary Diagnosis:	Orthopnea

## 2019-11-07 NOTE — ED ADULT NURSE NOTE - CHIEF COMPLAINT QUOTE
Patient arrived to ED today from [facility] to have two units of blood transfused.  Patient reports he feels weak and SOB.  Patient reports he has stage 5 kidney CA and today he had the blood drawn that showed low H&H.

## 2019-11-07 NOTE — H&P ADULT - NSICDXFAMILYHX_GEN_ALL_CORE_FT
FAMILY HISTORY:  Family history of cancer  Family history of lung cancer  Family history of premature CAD

## 2019-11-07 NOTE — ED STATDOCS - PHYSICAL EXAMINATION
General:     NAD, well-nourished, well-appearing  Head:     NC/AT, EOMI, oral mucosa moist  Neck:     trachea midline  Lungs:     CTA b/l, no w/r/r  CVS:     S1S2, RRR, no m/g/r  Abd:     +BS, s/nt/nd, no organomegaly  Ext:    2+ radial and pedal pulses, no c/c/e  Neuro: AAOx3, no sensory/motor deficits General:     NAD  Head:     NC/AT, EOMI, oral mucosa moist  Neck:     trachea midline  Lungs:     CTA b/l, no w/r/r  CVS:     S1S2, RRR, pan-systolic murmur loudest @ LSB  Abd:     +BS, s/nt/nd, no organomegaly  Ext:    2+ radial and pedal pulses, no c/c/e  Neuro: grossly intact

## 2019-11-07 NOTE — H&P ADULT - ASSESSMENT
84 y/o male with PMH of CKD-5 not on HD, anemia, arrythmia, DM-2 diet controlled, HTN was sent to the ED from [facility] for low Hb. As per daughter at bed side, patient has been noticed to have shortness of breath with minimal exertion in the past 2-3 days. Patient has no chest pain, palpitation, hematuria, hematochezia, melena, abdominal pain, change in bowel/urinary habit, dizziness.      Symptomatic anemia   Admit to monitor bed with    Hb: 6.7  2 units of PRBC 86 y/o male with PMH of CKD-5 not on HD, anemia, CHFpEF, arrythmia, DM-2 diet controlled, [facility] As per daughter at bed side, patient has been noticed to have shortness of breath with minimal exertion in the past 2-3 days. Patient has no chest pain, palpitation, hematuria, hematochezia, melena, abdominal pain, change in bowel/urinary habit, dizziness.     Shortness of breath   Admit to monitor bed with    Etiology is multifactorial: anemia of chronic dx in the setting of CKD-V, CHFpEF   O2 via NC as needed     Symptomatic anemia   Hb: 6.7  2 units of PRBC   Will give Lasix 20mg IV after each unit   Monitor Hb; transfuse to keep Hb>7   Patient follows with Dr. Baker     CKD-5   Not on HD   Follows with [facility]   Continue Calcitriol   Monitor renal function     VT   Continue Flecainide 100mg bid     HTN   Monitor BP   Will hold Nifedipine 60mg for tonight; BP on the soft side; resume as needed   Hydralazine 100mg bid     CHFpEF  Patient is on Torsemide 20mg daily; will hold because patient to get Lasix 20mg after every unit of blood   Resume Torsemide as needed   He also takes Metolazone 5mg PRN     CAD   Hold Aspirin 81mg pending stabilization of H/H  Resume as needed     Supportive   DVT prophylaxis: CSD   Diet: renal

## 2019-11-07 NOTE — ED ADULT TRIAGE NOTE - CHIEF COMPLAINT QUOTE
Patient arrived to ED today from [facility] to have two units of blood transfused.  Patient reports he feels weak and SOB.  Patient reports he has stage 5 kidney CA and today he had the blood drawn that showed low H&H.

## 2019-11-07 NOTE — ED STATDOCS - NS ED ROS FT
Constitutional: (-) fever  (-)chills  (-)sweats, (+) fatigue  Eyes/ENT: (-) blurry vision, (-) epistaxis  (-)rhinorrhea   (-) sore throat    Cardiovascular: (-) chest pain, (-) palpitations (-) edema   Respiratory: (-) cough, (+) mild shortness of breath   Gastrointestinal: (-)nausea  (-)vomiting, (-) diarrhea  (-) abdominal pain   :  (-)dysuria, (-)frequency, (-)urgency, (-)hematuria  Musculoskeletal: (-) neck pain, (-) back pain, (-) joint pain  Integumentary: (-) rash, (-) edema  Neurological: (-) headache, (-) altered mental status  (-)LOC

## 2019-11-07 NOTE — H&P ADULT - HISTORY OF PRESENT ILLNESS
84 y/o male with PMH of CKD 86 y/o male with PMH of CKD-5 not on HD, anemia, arrythmia, DM-2 diet controlled, HTN was sent to the ED from [facility] for low Hb. As per daughter at bed side, patient has been noticed to have shortness of breath with minimal exertion in the past 2-3 days. Patient has no chest pain, palpitation, hematuria, hematochezia, melena, abdominal pain, change in bowel/urinary habit, dizziness. 86 y/o male with PMH of CKD-5 not on HD, anemia, CHFpEF, arrythmia, DM-2 diet controlled, HTN was sent to the ED from [facility] for low Hb. As per daughter at bed side, patient has been noticed to have shortness of breath with minimal exertion in the past 2-3 days. Patient has no chest pain, palpitation, hematuria, hematochezia, melena, abdominal pain, change in bowel/urinary habit, dizziness.

## 2019-11-07 NOTE — ED ADULT NURSE REASSESSMENT NOTE - NS ED NURSE REASSESS COMMENT FT1
Received report from offgoing RN. Charting as noted. Patient A&Ox3 in no apparent distress. PRBCs running at this time. No signs of blood transfusion reaction. Airway patent, respirations even, spontaneous, and unlabored. Patient denies any pain at this time. Remains on cardiac monitor. Awaiting bed. Plan of care explained. Verbalized understanding. Family at bedside.

## 2019-11-07 NOTE — H&P ADULT - NSICDXPASTMEDICALHX_GEN_ALL_CORE_FT
PAST MEDICAL HISTORY:  Anemia     Arrhythmia     AV block, 1st degree     CAD (coronary artery disease)     CKD (chronic kidney disease) stage IV    DM (diabetes mellitus)     RICHARDS (dyspnea on exertion)     HTN (hypertension)     Risk factors for obstructive sleep apnea     VT (ventricular tachycardia)

## 2019-11-07 NOTE — CONSULT NOTE ADULT - SUBJECTIVE AND OBJECTIVE BOX
[facility], THE HEART CENTER                [address] [place] [address]                                     PHONE: [phone]                                       FAX: [phone]  [email or web address]      Reason for Consult: shortness of breath and weakness     HPI: Mr King is a 86 y/o man with HTN, HLD, moderate CAD, hx of VT (no ICD on Flecainide), CKD IV s/p fistula placement, Anemia, DM, carotid stenosis s/p CEA, chronic anemia on Aransep s/p prior transfusions of pRBC, last admitted 2/12019 with worsening renal failure and hypervolemia who was referred for admission by his hematologist for concerns related to progressive fatigue and dyspnea with minimal exertion and acute on chronic anemia.     PAST MEDICAL & SURGICAL HISTORY:  Risk factors for obstructive sleep apnea  Anemia  RICHARDS (dyspnea on exertion)  VT (ventricular tachycardia)  HTN (hypertension)  CAD (coronary artery disease)  CKD (chronic kidney disease): stage IV  Arrhythmia  AV block, 1st degree  DM (diabetes mellitus)  H/O carotid endarterectomy: Right  A-V fistula: left arm 5/2017  H/O angioplasty: 2013,  no  intervention  H/O left knee surgery  H/O circumcision: at  age  65    Telemetry:     PREVIOUS DIAGNOSTIC TESTING:      EKG: < from: 12 Lead ECG (08.08.18 @ 16:56) >Sinus rhythm with 1st degree A-V block. Non-specific intra-ventricular conduction delay    ECHO FINDINGS:  < from: TTE Echo Complete w/Doppler (08.09.18 @ 09:42) >   1. Left ventricular ejection fraction, by visual estimation, is >75%.   2. Hyperdynamic global left ventricular systolic function.   3. Normal left ventricular internal cavity size.   4. Spectral Doppler shows pseudonormal pattern of left ventricular myocardial filling (Grade II diastolic dysfunction).   5. Severe left atrial enlargement with LA volume index of 64.9 ml/m2.   6. There is no evidence of pericardial effusion.   7. Moderate mitral annular calcification.   8. Thickening of the anterior and posterior mitral valve leaflets.   9. Trace tricuspid regurgitation.  10. Pulmonic valve regurgitation.  11. Estimated pulmonary artery systolic pressure is 45.9 mmHg assuming a right atrial pressure of 8 mmHg, which is consistent with mild pulmonary hypertension.    CATHETERIZATION FINDINGS:  < from: Cardiac Cath Lab - Adult (02.03.17 @ 12:41) >  CORONARY VESSELS: The coronary circulation is right dominant.  LM:   --  LM: Normal.  LAD:   --  Proximal LAD: There was a 40 % stenosis. Mid LAD: There was a 30 % stenosis. Distal LAD: Angiography showed minor luminal irregularities with no flow limiting lesions. Mid circumflex: There was a 50 % stenosis. OM1: There was a 30 % stenosis at the ostium of the vessel segment.  RCA:   --  RCA: The vessel arose anomalously from the left sinus of Valsalva. Mid RCA: There was a 70 % stenosis.    MEDICATIONS  (STANDING):  furosemide   Injectable 20 milliGRAM(s) IV Push Once  Home Medications:  Aspirin Enteric Coated 81 mg oral delayed release tablet: 2 tab(s) orally once a day (07 Nov 2019 18:16)  atorvastatin 40 mg oral tablet: 1 tab(s) orally once a day (at bedtime) (07 Nov 2019 18:16)  calcitriol 0.25 mcg oral capsule: 1 cap(s) orally once a day (07 Nov 2019 18:16)  ferrous sulfate 325 mg (65 mg elemental iron) oral delayed release tablet: 1 tab(s) orally once a day (07 Nov 2019 18:16)  flecainide 100 mg oral tablet: 1 tab(s) orally every 12 hours (07 Nov 2019 18:16)  hydrALAZINE 100 mg oral tablet: orally 3 times a day (07 Nov 2019 18:16)  metOLazone 5 mg oral tablet: 1 tab(s) orally once a day, As Needed (07 Nov 2019 19:21)  Nephro-Thomas oral tablet: 1 tab(s) orally once a day (07 Nov 2019 18:16)  NIFEdipine 60 mg oral tablet, extended release: 1 tab(s) orally once a day (07 Nov 2019 18:16)  NIFEdipine 90 mg oral tablet, extended release: 1 tab(s) orally once a day (07 Nov 2019 18:16)  torsemide 20 mg oral tablet: 1 tab(s) orally once a day (07 Nov 2019 18:16)  Vitamin C 250 mg oral tablet: 1 tab(s) orally once a day (07 Nov 2019 18:16)    MEDICATIONS  (PRN):    FAMILY HISTORY:  Family history of premature CAD  Family history of lung cancer  Family history of cancer    SOCIAL HISTORY:  CIGARETTES: denies   ALCOHOL: denies     ROS: Negative other than as mentioned in HPI.    Vital Signs Last 24 Hrs  T(C): 36.2 (07 Nov 2019 17:32), Max: 36.5 (07 Nov 2019 15:20)  T(F): 97.2 (07 Nov 2019 17:32), Max: 97.7 (07 Nov 2019 15:20)  HR: 71 (07 Nov 2019 17:32) (71 - 74)  BP: 166/67 (07 Nov 2019 17:32) (148/53 - 166/67)  BP(mean): --  RR: 20 (07 Nov 2019 17:32) (18 - 20)  SpO2: 96% (07 Nov 2019 15:20) (96% - 96%)    PHYSICAL EXAM:  General: Appears well developed, well nourished alert and cooperative.  HEENT: Head; normocephalic, atraumatic. sclera anicteric, MMM, (+) JVD, neck is supple   CARDIOVASCULAR; 1/6 DEANA, no rub, gallop or lift. Normal S1 and S2.  LUNGS; No rales, rhonchi or wheeze. Normal breath sounds bilaterally.  ABDOMEN ; Soft, nontender without mass or organomegaly. bowel sounds normoactive.  EXTREMITIES; No clubbing, cyanosis or edema. Distal pulses wnl. ROM normal.  SKIN; warm and dry with normal turgor.  NEURO; Alert/oriented x 3/normal motor exam.     PSYCH; normal affect.        I&O's Detail    LABS:                        6.7    8.00  )-----------( 215      ( 07 Nov 2019 16:16 )             23.4     11-07    138  |  101  |  102.0<H>  ----------------------------<  205<H>  4.4   |  16.0<L>  |  4.74<H>    Ca    9.2      07 Nov 2019 16:16  Mg     2.4     11-07    TPro  6.5<L>  /  Alb  3.7  /  TBili  0.3<L>  /  DBili  x   /  AST  18  /  ALT  26  /  AlkPhos  88  11-07    CARDIAC MARKERS ( 07 Nov 2019 16:16 )  x     / 0.06 ng/mL / x     / x     / x          PT/INR - ( 07 Nov 2019 16:16 )   PT: 11.6 sec;   INR: 1.01 ratio         PTT - ( 07 Nov 2019 16:16 )  PTT:35.0 sec    I&O's Summary      RADIOLOGY & ADDITIONAL STUDIES:  CXR pending [facility], [facility]                [address] [place] [address]                                     PHONE: [phone]                                       FAX: [phone]  [email or web address]      Reason for Consult: shortness of breath and weakness     HPI: Mr King is a 84 y/o man with HTN, HLD, moderate CAD, hx of VT (no ICD on Flecainide), chronic HFpEF, CKD IV s/p fistula placement, Anemia, DM, carotid stenosis s/p CEA, chronic anemia on Aransep s/p prior transfusions of pRBC, last admitted 2/12019 with worsening renal failure and hypervolemia who was referred for admission by his hematologist for concerns related to progressive fatigue and dyspnea with minimal exertion and acute on chronic anemia. Additionally reports 2 pillow orthopnea and PND without increased LE edema. He denies chest pain.     PAST MEDICAL & SURGICAL HISTORY:  Risk factors for obstructive sleep apnea  Anemia  RICHARDS (dyspnea on exertion)  VT (ventricular tachycardia)  HTN (hypertension)  CAD (coronary artery disease)  CKD (chronic kidney disease): stage IV  Arrhythmia  AV block, 1st degree  DM (diabetes mellitus)  H/O carotid endarterectomy: Right  A-V fistula: left arm 5/2017  H/O angioplasty: 2013,  no  intervention  H/O left knee surgery  H/O circumcision: at  age  65    PREVIOUS DIAGNOSTIC TESTING:      EKG: < from: 12 Lead ECG (08.08.18 @ 16:56) >Sinus rhythm with 1st degree A-V block. Non-specific intra-ventricular conduction delay    ECHO FINDINGS:  < from: TTE Echo Complete w/Doppler (08.09.18 @ 09:42) >   1. Left ventricular ejection fraction, by visual estimation, is >75%.   2. Hyperdynamic global left ventricular systolic function.   3. Normal left ventricular internal cavity size.   4. Spectral Doppler shows pseudonormal pattern of left ventricular myocardial filling (Grade II diastolic dysfunction).   5. Severe left atrial enlargement with LA volume index of 64.9 ml/m2.   6. There is no evidence of pericardial effusion.   7. Moderate mitral annular calcification.   8. Thickening of the anterior and posterior mitral valve leaflets.   9. Trace tricuspid regurgitation.  10. Pulmonic valve regurgitation.  11. Estimated pulmonary artery systolic pressure is 45.9 mmHg assuming a right atrial pressure of 8 mmHg, which is consistent with mild pulmonary hypertension.    CATHETERIZATION FINDINGS:  < from: Cardiac Cath Lab - Adult (02.03.17 @ 12:41) >  CORONARY VESSELS: The coronary circulation is right dominant.  LM:   --  LM: Normal.  LAD:   --  Proximal LAD: There was a 40 % stenosis. Mid LAD: There was a 30 % stenosis. Distal LAD: Angiography showed minor luminal irregularities with no flow limiting lesions. Mid circumflex: There was a 50 % stenosis. OM1: There was a 30 % stenosis at the ostium of the vessel segment.  RCA:   --  RCA: The vessel arose anomalously from the left sinus of Valsalva. Mid RCA: There was a 70 % stenosis.    MEDICATIONS  (STANDING):  furosemide   Injectable 20 milliGRAM(s) IV Push Once  Home Medications:  Aspirin Enteric Coated 81 mg oral delayed release tablet: 2 tab(s) orally once a day (07 Nov 2019 18:16)  atorvastatin 40 mg oral tablet: 1 tab(s) orally once a day (at bedtime) (07 Nov 2019 18:16)  calcitriol 0.25 mcg oral capsule: 1 cap(s) orally once a day (07 Nov 2019 18:16)  ferrous sulfate 325 mg (65 mg elemental iron) oral delayed release tablet: 1 tab(s) orally once a day (07 Nov 2019 18:16)  flecainide 100 mg oral tablet: 1 tab(s) orally every 12 hours (07 Nov 2019 18:16)  hydrALAZINE 100 mg oral tablet: orally 3 times a day (07 Nov 2019 18:16)  metOLazone 5 mg oral tablet: 1 tab(s) orally once a day, As Needed (07 Nov 2019 19:21)  Nephro-Thomas oral tablet: 1 tab(s) orally once a day (07 Nov 2019 18:16)  NIFEdipine 60 mg oral tablet, extended release: 1 tab(s) orally once a day (07 Nov 2019 18:16)  NIFEdipine 90 mg oral tablet, extended release: 1 tab(s) orally once a day (07 Nov 2019 18:16)  torsemide 20 mg oral tablet: 1 tab(s) orally once a day (07 Nov 2019 18:16)  Vitamin C 250 mg oral tablet: 1 tab(s) orally once a day (07 Nov 2019 18:16)    MEDICATIONS  (PRN):    FAMILY HISTORY:  Family history of premature CAD  Family history of lung cancer  Family history of cancer    SOCIAL HISTORY:  CIGARETTES: denies   ALCOHOL: denies     ROS: Negative other than as mentioned in HPI.    Vital Signs Last 24 Hrs  T(C): 36.2 (07 Nov 2019 17:32), Max: 36.5 (07 Nov 2019 15:20)  T(F): 97.2 (07 Nov 2019 17:32), Max: 97.7 (07 Nov 2019 15:20)  HR: 71 (07 Nov 2019 17:32) (71 - 74)  BP: 166/67 (07 Nov 2019 17:32) (148/53 - 166/67)  BP(mean): --  RR: 20 (07 Nov 2019 17:32) (18 - 20)  SpO2: 96% (07 Nov 2019 15:20) (96% - 96%)    PHYSICAL EXAM:  General: Appears well developed, well nourished alert and cooperative.  HEENT: Head; normocephalic, atraumatic. sclera anicteric, MMM, (+) JVD, neck is supple   CARDIOVASCULAR; 2/6 DEANA across the precordium, no rub, (+) gallop, (-) lift. Normal S1 and S2.  LUNGS; No rales, rhonchi or wheeze. Normal breath sounds bilaterally.  ABDOMEN ; Soft, nontender without mass or organomegaly. bowel sounds normoactive.  EXTREMITIES; No clubbing, cyanosis or edema. Distal pulses wnl. ROM normal.  SKIN; warm and dry with normal turgor.  NEURO; Alert/oriented x 3/normal motor exam.     PSYCH; normal affect.        I&O's Detail    LABS:                        6.7    8.00  )-----------( 215      ( 07 Nov 2019 16:16 )             23.4     11-07    138  |  101  |  102.0<H>  ----------------------------<  205<H>  4.4   |  16.0<L>  |  4.74<H>    Ca    9.2      07 Nov 2019 16:16  Mg     2.4     11-07    TPro  6.5<L>  /  Alb  3.7  /  TBili  0.3<L>  /  DBili  x   /  AST  18  /  ALT  26  /  AlkPhos  88  11-07    CARDIAC MARKERS ( 07 Nov 2019 16:16 )  x     / 0.06 ng/mL / x     / x     / x          PT/INR - ( 07 Nov 2019 16:16 )   PT: 11.6 sec;   INR: 1.01 ratio         PTT - ( 07 Nov 2019 16:16 )  PTT:35.0 sec    I&O's Summary      RADIOLOGY & ADDITIONAL STUDIES:  CXR pending

## 2019-11-07 NOTE — ED ADULT NURSE NOTE - OBJECTIVE STATEMENT
pt care assumed at 1600, no apparent distress noted at this time. pt received A&OX3 resting in bed comfortably with daughter at bedside. pt c/o increased shortness of breath. pt is a pt of the [facility] and was sent from there due to low hemoglobin. HR is NSR on cardiac monitor, lung sounds are clear b/l, abd is soft and nontender with positive bowel sounds in all four quadrants, skin is warm, dry and appropriate for age and race. pt educated on plan of care, plan of care taught back to RN. proficency determined from successful pt teach back. will continue to educate pt throughout ED stay.

## 2019-11-07 NOTE — ED STATDOCS - OBJECTIVE STATEMENT
86 y/o M pt with PMH Stage IV/V ESRD, CAD, DM, arrythmia on dialysis presents to ED with c/c fatigue and mild SOB. Follows with Dr. Baker Sent by Dr. Baker at [facility] for 2 units of blood s/p H&H dropped from 9.5-7.2 within 1 week. Last transfusion was summer 2019. Takes 81mg ASA daily. Is allergic to Plavix and Toparol. No further complaints at this time.   Card: Xiao  PMH: ESRD, CAD, DM, arrythmia  No tobacco/illicit substance use/socialEtOH 84 y/o M pt with PMH signif for CKD Stage IV (followed by Dr. Dennis), CAD, Cardiac Arrythmia/VT (on Flecainide) , DM; now p/w fatigue and mild SOB. patient reports increasing sob/eaton/orthopnea over past 1 week. denies cp/palp. denies lightheadedness. denies cough. denies f/c/s.   Follows with Dr. Baker at [facility] for anemia (regularly receives Aranesp).  Sent by Dr. Baker for 2 units of blood s/p H&H dropped from 9.5 to 7.2 within 1 week. Last transfusion was summer 2019. Takes 81mg ASA daily. No further complaints at this time.   PMH: Corrine   Nephrology:  [facility]   Card: Xiao  PMH: CKD, CAD, DM, arrythmia,   No tobacco/illicit substance use/socialEtOH 84 y/o M pt with PMH signif for CKD Stage IV (followed by Dr. Dennis), CAD, Cardiac Arrythmia/VT (on Flecainide) , DM; now p/w fatigue and mild SOB. patient reports increasing sob/eaton/orthopnea over past 1 week. denies cp/palp. denies lightheadedness. denies cough. denies f/c/s.   Follows with Dr. Baker at [facility] for anemia (regularly receives Aranesp).  Sent by Dr. Baker for 2 units of blood s/p H&H dropped from 9.5 to 7.2 within 1 week. Last transfusion was summer 2019. Takes 81mg ASA daily. No further complaints at this time.   PMD: Corrine   Nephrology:  [facility]   Card: Xiao  PMH: CKD, CAD, DM, arrythmia,   No tobacco/illicit substance use/socialEtOH

## 2019-11-08 DIAGNOSIS — R19.5 OTHER FECAL ABNORMALITIES: ICD-10-CM

## 2019-11-08 DIAGNOSIS — D64.9 ANEMIA, UNSPECIFIED: ICD-10-CM

## 2019-11-08 LAB
ANION GAP SERPL CALC-SCNC: 16 MMOL/L — SIGNIFICANT CHANGE UP (ref 5–17)
BUN SERPL-MCNC: 106 MG/DL — HIGH (ref 8–20)
CALCIUM SERPL-MCNC: 8.6 MG/DL — SIGNIFICANT CHANGE UP (ref 8.6–10.2)
CHLORIDE SERPL-SCNC: 110 MMOL/L — HIGH (ref 98–107)
CO2 SERPL-SCNC: 17 MMOL/L — LOW (ref 22–29)
CREAT SERPL-MCNC: 4.66 MG/DL — HIGH (ref 0.5–1.3)
GLUCOSE SERPL-MCNC: 116 MG/DL — HIGH (ref 70–115)
HCT VFR BLD CALC: 24.6 % — LOW (ref 39–50)
HCT VFR BLD CALC: 28.4 % — LOW (ref 39–50)
HGB BLD-MCNC: 7.6 G/DL — LOW (ref 13–17)
HGB BLD-MCNC: 8.8 G/DL — LOW (ref 13–17)
MCHC RBC-ENTMCNC: 29.1 PG — SIGNIFICANT CHANGE UP (ref 27–34)
MCHC RBC-ENTMCNC: 29.1 PG — SIGNIFICANT CHANGE UP (ref 27–34)
MCHC RBC-ENTMCNC: 30.9 GM/DL — LOW (ref 32–36)
MCHC RBC-ENTMCNC: 31 GM/DL — LOW (ref 32–36)
MCV RBC AUTO: 94 FL — SIGNIFICANT CHANGE UP (ref 80–100)
MCV RBC AUTO: 94.3 FL — SIGNIFICANT CHANGE UP (ref 80–100)
PLATELET # BLD AUTO: 157 K/UL — SIGNIFICANT CHANGE UP (ref 150–400)
PLATELET # BLD AUTO: 215 K/UL — SIGNIFICANT CHANGE UP (ref 150–400)
POTASSIUM SERPL-MCNC: 4.1 MMOL/L — SIGNIFICANT CHANGE UP (ref 3.5–5.3)
POTASSIUM SERPL-SCNC: 4.1 MMOL/L — SIGNIFICANT CHANGE UP (ref 3.5–5.3)
RBC # BLD: 2.61 M/UL — LOW (ref 4.2–5.8)
RBC # BLD: 3.02 M/UL — LOW (ref 4.2–5.8)
RBC # FLD: 16.4 % — HIGH (ref 10.3–14.5)
RBC # FLD: 17 % — HIGH (ref 10.3–14.5)
SODIUM SERPL-SCNC: 143 MMOL/L — SIGNIFICANT CHANGE UP (ref 135–145)
VIT B12 SERPL-MCNC: 497 PG/ML — SIGNIFICANT CHANGE UP (ref 232–1245)
WBC # BLD: 6.84 K/UL — SIGNIFICANT CHANGE UP (ref 3.8–10.5)
WBC # BLD: 9.94 K/UL — SIGNIFICANT CHANGE UP (ref 3.8–10.5)
WBC # FLD AUTO: 6.84 K/UL — SIGNIFICANT CHANGE UP (ref 3.8–10.5)
WBC # FLD AUTO: 9.94 K/UL — SIGNIFICANT CHANGE UP (ref 3.8–10.5)

## 2019-11-08 PROCEDURE — 99223 1ST HOSP IP/OBS HIGH 75: CPT

## 2019-11-08 PROCEDURE — 99232 SBSQ HOSP IP/OBS MODERATE 35: CPT

## 2019-11-08 PROCEDURE — 93010 ELECTROCARDIOGRAM REPORT: CPT

## 2019-11-08 RX ORDER — ERYTHROPOIETIN 10000 [IU]/ML
10000 INJECTION, SOLUTION INTRAVENOUS; SUBCUTANEOUS
Refills: 0 | Status: DISCONTINUED | OUTPATIENT
Start: 2019-11-08 | End: 2019-11-13

## 2019-11-08 RX ORDER — IRON SUCROSE 20 MG/ML
200 INJECTION, SOLUTION INTRAVENOUS EVERY 24 HOURS
Refills: 0 | Status: COMPLETED | OUTPATIENT
Start: 2019-11-08 | End: 2019-11-12

## 2019-11-08 RX ORDER — FUROSEMIDE 40 MG
20 TABLET ORAL ONCE
Refills: 0 | Status: COMPLETED | OUTPATIENT
Start: 2019-11-08 | End: 2019-11-08

## 2019-11-08 RX ORDER — HYDRALAZINE HCL 50 MG
10 TABLET ORAL EVERY 4 HOURS
Refills: 0 | Status: DISCONTINUED | OUTPATIENT
Start: 2019-11-08 | End: 2019-11-13

## 2019-11-08 RX ORDER — IRON SUCROSE 20 MG/ML
200 INJECTION, SOLUTION INTRAVENOUS EVERY 24 HOURS
Refills: 0 | Status: DISCONTINUED | OUTPATIENT
Start: 2019-11-08 | End: 2019-11-08

## 2019-11-08 RX ORDER — PANTOPRAZOLE SODIUM 20 MG/1
40 TABLET, DELAYED RELEASE ORAL
Refills: 0 | Status: DISCONTINUED | OUTPATIENT
Start: 2019-11-08 | End: 2019-11-13

## 2019-11-08 RX ORDER — NIFEDIPINE 30 MG
90 TABLET, EXTENDED RELEASE 24 HR ORAL DAILY
Refills: 0 | Status: DISCONTINUED | OUTPATIENT
Start: 2019-11-08 | End: 2019-11-13

## 2019-11-08 RX ADMIN — Medication 10 MILLIGRAM(S): at 10:32

## 2019-11-08 RX ADMIN — Medication 20 MILLIGRAM(S): at 02:59

## 2019-11-08 RX ADMIN — Medication 100 MILLIGRAM(S): at 22:06

## 2019-11-08 RX ADMIN — Medication 100 MILLIGRAM(S): at 13:33

## 2019-11-08 RX ADMIN — Medication 90 MILLIGRAM(S): at 12:53

## 2019-11-08 RX ADMIN — ATORVASTATIN CALCIUM 40 MILLIGRAM(S): 80 TABLET, FILM COATED ORAL at 22:07

## 2019-11-08 RX ADMIN — Medication 500 MILLIGRAM(S): at 10:01

## 2019-11-08 RX ADMIN — Medication 1 TABLET(S): at 10:01

## 2019-11-08 RX ADMIN — Medication 20 MILLIGRAM(S): at 12:53

## 2019-11-08 RX ADMIN — Medication 325 MILLIGRAM(S): at 10:00

## 2019-11-08 RX ADMIN — Medication 100 MILLIGRAM(S): at 06:00

## 2019-11-08 RX ADMIN — ERYTHROPOIETIN 10000 UNIT(S): 10000 INJECTION, SOLUTION INTRAVENOUS; SUBCUTANEOUS at 22:07

## 2019-11-08 RX ADMIN — PANTOPRAZOLE SODIUM 40 MILLIGRAM(S): 20 TABLET, DELAYED RELEASE ORAL at 12:53

## 2019-11-08 RX ADMIN — Medication 100 MILLIGRAM(S): at 22:07

## 2019-11-08 RX ADMIN — Medication 100 MILLIGRAM(S): at 10:00

## 2019-11-08 RX ADMIN — CALCITRIOL 0.25 MICROGRAM(S): 0.5 CAPSULE ORAL at 10:01

## 2019-11-08 RX ADMIN — IRON SUCROSE 110 MILLIGRAM(S): 20 INJECTION, SOLUTION INTRAVENOUS at 13:34

## 2019-11-08 NOTE — CONSULT NOTE ADULT - PROBLEM SELECTOR RECOMMENDATION 9
Rule out possible colonic neoplasm. Cardiac clearance for colonoscopy and possibly EGD if colonoscopy negative which can be done as inpatient or OPT. If pt to remain in house will tentatively schedule him for colonoscopy Monday pending cardiac clearance. Pt. may eat as there is no evidence of active GI bleeding and no endoscopic procedures will be done until Monday. Repeat labs ordered for the AM. Pantoprazole for GI mucosal cytoprotection.

## 2019-11-08 NOTE — CONSULT NOTE ADULT - SUBJECTIVE AND OBJECTIVE BOX
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 Pt presented with Hb of 6.7, now s/p 2 U PRBC. Nephrology consulted for CKD. pt has known history of CKD stage V and follows with Dr. Avendano. He also has a LUE AVF that was created yesterday. Pt seen and exam with his daughter at bedside. Pt states he feels well. Denies nausea, vomiting, metallic taste in mouth. Appetite is poor. Denies changes in urination, leg edema.          PAST HISTORY  --------------------------------------------------------------------------------  PAST MEDICAL & SURGICAL HISTORY:  Risk factors for obstructive sleep apnea  Anemia  RICHARDS (dyspnea on exertion)  VT (ventricular tachycardia)  HTN (hypertension)  CAD (coronary artery disease)  CKD (chronic kidney disease): stage IV  Arrhythmia  AV block, 1st degree  DM (diabetes mellitus)  H/O carotid endarterectomy: Right  A-V fistula: left arm 5/2017  H/O angioplasty: 2013,  no  intervention  H/O left knee surgery  H/O circumcision: at  age  65    FAMILY HISTORY:  Family history of premature CAD  Family history of lung cancer  Family history of cancer    PAST SOCIAL HISTORY:    ALLERGIES & MEDICATIONS  --------------------------------------------------------------------------------  Allergies    Plavix (Hives)  Toprol-XL (Rash)    Intolerances      Standing Inpatient Medications  ascorbic acid 500 milliGRAM(s) Oral daily  atorvastatin 40 milliGRAM(s) Oral at bedtime  calcitriol   Capsule 0.25 MICROGram(s) Oral daily  epoetin juan Injectable 16413 Unit(s) SubCutaneous <User Schedule>  ferrous    sulfate 325 milliGRAM(s) Oral daily  flecainide 100 milliGRAM(s) Oral two times a day  hydrALAZINE 100 milliGRAM(s) Oral three times a day  iron sucrose IVPB 200 milliGRAM(s) IV Intermittent every 24 hours  multivitamin 1 Tablet(s) Oral daily  NIFEdipine XL 90 milliGRAM(s) Oral daily  pantoprazole    Tablet 40 milliGRAM(s) Oral before breakfast  torsemide 20 milliGRAM(s) Oral daily    PRN Inpatient Medications  hydrALAZINE Injectable 10 milliGRAM(s) IV Push every 4 hours PRN      REVIEW OF SYSTEMS  --------------------------------------------------------------------------------  Gen: No weight changes, fatigue+ No fevers/chills, weakness  Skin: No rashes  Head/Eyes/Ears/Mouth: No headache; Normal hearing; Normal vision w/o blurriness; No sinus pain/discomfort, sore throat  Respiratory:  dyspnea+ No cough, wheezing, hemoptysis  CV: No chest pain, PND, orthopnea  GI: No abdominal pain, diarrhea, constipation, nausea, vomiting, black stools+  : No increased frequency, dysuria, hematuria, nocturia  MSK: No joint pain/swelling; no back pain; no edema  Neuro: No dizziness/lightheadedness, weakness, seizures  Heme: No easy bruising or bleeding  Endo: No heat/cold intolerance  Psych: No significant nervousness, anxiety, stress, depression    All other systems were reviewed and are negative, except as noted.    VITALS/PHYSICAL EXAM  --------------------------------------------------------------------------------  T(C): 36.7 (11-08-19 @ 09:54), Max: 37.3 (11-07-19 @ 23:52)  HR: 87 (11-08-19 @ 09:54) (70 - 87)  BP: 171/67 (11-08-19 @ 13:32) (130/60 - 206/60)  RR: 18 (11-08-19 @ 09:54) (16 - 20)  SpO2: 98% (11-08-19 @ 09:54) (93% - 98%)  Wt(kg): --  Height (cm): 165.1 (11-08-19 @ 07:05)  Weight (kg): 70.5 (11-08-19 @ 07:05)  BMI (kg/m2): 25.9 (11-08-19 @ 07:05)  BSA (m2): 1.78 (11-08-19 @ 07:05)      11-07-19 @ 07:01  -  11-08-19 @ 07:00  --------------------------------------------------------  IN: 646 mL / OUT: 450 mL / NET: 196 mL    11-08-19 @ 07:01  -  11-08-19 @ 14:42  --------------------------------------------------------  IN: 120 mL / OUT: 0 mL / NET: 120 mL      Physical Exam:  	Gen: NAD, sitting in bed  	HEENT: supple neck, on room air  	Pulm: CTA B/L  	CV: RRR, S1S2; no rub  	Back: No spinal or CVA tenderness; no sacral edema  	Abd: +BS, soft, nontender/nondistended  	: No suprapubic tenderness  	UE: Warm,  no edema; no asterixis  	LE: Warm, ; no edema  	Neuro: No focal deficits  	Psych: Normal affect and mood  	Skin: Warm, dry, pale  	Vascular access: LUE AVF, thrill+ bruit+      LABS/STUDIES  --------------------------------------------------------------------------------              8.8    9.94  >-----------<  215      [11-08-19 @ 10:44]              28.4     143  |  110  |  106.0  ----------------------------<  116      [11-08-19 @ 04:50]  4.1   |  17.0  |  4.66        Ca     8.6     [11-08-19 @ 04:50]      Mg     2.4     [11-07-19 @ 16:16]    TPro  6.5  /  Alb  3.7  /  TBili  0.3  /  DBili  x   /  AST  18  /  ALT  26  /  AlkPhos  88  [11-07-19 @ 16:16]    PT/INR: PT 11.6 , INR 1.01       [11-07-19 @ 16:16]  PTT: 35.0       [11-07-19 @ 16:16]    Troponin 0.06      [11-07-19 @ 16:16]    Creatinine Trend:  SCr 4.66 [11-08 @ 04:50]  SCr 4.74 [11-07 @ 16:16]    Urinalysis - [02-01-18 @ 20:53]      Color Yellow / Appearance Clear / SG 1.015 / pH 6.0      Gluc Negative / Ketone Negative  / Bili Negative / Urobili Negative       Blood Trace / Protein 100 / Leuk Est Negative / Nitrite Negative      RBC 0-2 / WBC 0-2 / Hyaline  / Gran  / Sq Epi  / Non Sq Epi Rare / Bacteria       HbA1c 4.9      [08-09-18 @ 05:54]    HBsAb <3.0      [08-09-18 @ 18:29]  HBsAg Nonreact      [08-09-18 @ 18:29]  HBcAb Nonreact      [08-09-18 @ 18:29]  HCV 0.09, Nonreact      [08-09-18 @ 18:29]

## 2019-11-08 NOTE — PROGRESS NOTE ADULT - SUBJECTIVE AND OBJECTIVE BOX
[place] CARDIOVASCULAR - [facility], THE HEART CENTER                                   [address] [place] [address]                                                      PHONE: [phone]                                                         FAX: [phone]  [email or web address]  ---------------------------------------------------------------------------------------------------------------------------------    Overnight events/patient complaints:  NAD feeling better after PRBC     Plavix (Hives)  Toprol-XL (Rash)    MEDICATIONS  (STANDING):  ascorbic acid 500 milliGRAM(s) Oral daily  atorvastatin 40 milliGRAM(s) Oral at bedtime  calcitriol   Capsule 0.25 MICROGram(s) Oral daily  epoetin juan Injectable 66978 Unit(s) SubCutaneous <User Schedule>  ferrous    sulfate 325 milliGRAM(s) Oral daily  flecainide 100 milliGRAM(s) Oral two times a day  hydrALAZINE 100 milliGRAM(s) Oral three times a day  iron sucrose IVPB 200 milliGRAM(s) IV Intermittent every 24 hours  multivitamin 1 Tablet(s) Oral daily  NIFEdipine XL 90 milliGRAM(s) Oral daily  pantoprazole    Tablet 40 milliGRAM(s) Oral before breakfast  torsemide 20 milliGRAM(s) Oral daily    MEDICATIONS  (PRN):  hydrALAZINE Injectable 10 milliGRAM(s) IV Push every 4 hours PRN sbp>170      Vital Signs Last 24 Hrs  T(C): 36.7 (08 Nov 2019 09:54), Max: 37.3 (07 Nov 2019 23:52)  T(F): 98.1 (08 Nov 2019 09:54), Max: 99.1 (07 Nov 2019 23:52)  HR: 87 (08 Nov 2019 09:54) (70 - 87)  BP: 171/67 (08 Nov 2019 13:32) (130/60 - 206/60)  BP(mean): --  RR: 18 (08 Nov 2019 09:54) (16 - 20)  SpO2: 98% (08 Nov 2019 09:54) (93% - 98%)  ICU Vital Signs Last 24 Hrs  CHRISTIANO ELIZABETH  I&O's Detail    07 Nov 2019 07:01  -  08 Nov 2019 07:00  --------------------------------------------------------  IN:    Packed Red Blood Cells: 646 mL  Total IN: 646 mL    OUT:    Voided: 450 mL  Total OUT: 450 mL    Total NET: 196 mL      08 Nov 2019 07:01  -  08 Nov 2019 15:03  --------------------------------------------------------  IN:    Oral Fluid: 120 mL  Total IN: 120 mL    OUT:  Total OUT: 0 mL    Total NET: 120 mL        I&O's Summary    07 Nov 2019 07:01  -  08 Nov 2019 07:00  --------------------------------------------------------  IN: 646 mL / OUT: 450 mL / NET: 196 mL    08 Nov 2019 07:01  -  08 Nov 2019 15:03  --------------------------------------------------------  IN: 120 mL / OUT: 0 mL / NET: 120 mL      Drug Dosing Weight  CHRISTIANO ELIZABETH      PHYSICAL EXAM:  General: Appears well developed, well nourished alert and cooperative.  HEENT: Head; normocephalic, atraumatic.  Eyes: Pupils reactive, cornea wnl.  Neck: Supple, no nodes adenopathy, no NVD or carotid bruit or thyromegaly.  CARDIOVASCULAR: Normal S1 and S2, 3/6 murmur, rub, gallop or lift.   LUNGS: Decrease BS B/L   ABDOMEN: Soft, nontender without mass or organomegaly. bowel sounds normoactive.  EXTREMITIES: No clubbing, cyanosis or edema. Distal pulses wnl.   SKIN: warm and dry with normal turgor.  NEURO: Alert/oriented x 3/normal motor exam. No pathologic reflexes.    PSYCH: normal affect.        LABS:                        8.8    9.94  )-----------( 215      ( 08 Nov 2019 10:44 )             28.4     11-08    143  |  110<H>  |  106.0<H>  ----------------------------<  116<H>  4.1   |  17.0<L>  |  4.66<H>    Ca    8.6      08 Nov 2019 04:50  Mg     2.4     11-07    TPro  6.5<L>  /  Alb  3.7  /  TBili  0.3<L>  /  DBili  x   /  AST  18  /  ALT  26  /  AlkPhos  88  11-07    CHRISTIANO ELIZABETH  CARDIAC MARKERS ( 07 Nov 2019 16:16 )  x     / 0.06 ng/mL / x     / x     / x          PT/INR - ( 07 Nov 2019 16:16 )   PT: 11.6 sec;   INR: 1.01 ratio         PTT - ( 07 Nov 2019 16:16 )  PTT:35.0 sec      RADIOLOGY & ADDITIONAL STUDIES:    INTERPRETATION OF TELEMETRY (personally reviewed):    ECG:  Diagnosis Line Sinus rhythm [id number] degree A-V block  Left ventricular hypertrophy with QRS widening  Nonspecific ST abnormality  Abnormal ECG      ECHO: 8/2019  EF > 75% DDD LVH moderate AS DOI 0.34 mean gradient 22 mmHg MAC RVSP 39 mmHg       STRESS TEST: PET 2/2018 normal perfusion     CARDIAC CATHETERIZATION:  VENTRICLES: LV not done due to Cr 3.5  CORONARY VESSELS: The coronary circulation is right dominant.  LM:   --  LM: Normal.  LAD:   --  Proximal LAD: There was a 40 % stenosis.  --  Mid LAD: There was a 30 % stenosis.  --  Distal LAD: Angiography showed minor luminal irregularities withno  flow limiting lesions.  CX:   --  Mid circumflex: There was a 50 % stenosis.  --  OM1: There was a 30 % stenosis at the ostium of the vessel segment.  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  --  Mid RCA: There was a 70 % stenosis.  COMPLICATIONS: There were no complications. No complications occurred  during the cath lab visit.  DIAGNOSTIC IMPRESSIONS: Probably significant RCA disease with unusual take  off with non obstructive CAD of LCX and LAD  DIAGNOSTIC RECOMMENDATIONS: Nuclear PET SCAN. Possible future PCI The  patient should continue with the present medications.  INTERVENTIONAL IMPRESSIONS: Probably significant RCA disease with unusual  take off with non obstructive CAD of LCX and LAD  INTERVENTIONAL RECOMMENDATIONS: Nuclear PET SCAN. Possible future PCI  Prepared and signed by  Star Manriquez MD      ASSESSMENT AND PLAN:  86 y/o man with HTN, HLD, moderate CAD none obstructive PET CT 2/2018 normal perfusion, hx of VT (no ICD on Flecainide), chronic HFpEF LVH moderate AS lasted TTE 8/2019 see above CKD IV s/p fistula placement, Anemia, DM, carotid stenosis s/p CEA, chronic anemia on Aransep s/p prior transfusions of pRBC, last admitted 2/2019 with acute on chronic symptomatic anemia in which symptoms improved with PRBC.    PET CT 2/2018 normal perfusion   TTE 8/2019 hyperdynamic LV EF moderate AS     No evidence of ACS or decompensated heart failure     Patient undergoing a low risk GI procedure in which patient has a moderate CV risk but no active CV contraindication at this time

## 2019-11-08 NOTE — CONSULT NOTE ADULT - SUBJECTIVE AND OBJECTIVE BOX
Patient is a 85y old  Male who presents with a chief complaint of Symptomatic anemia (08 Nov 2019 10:43)      HPI:  86 y/o male with PMH of CKD-5 not on HD, anemia, CHFpEF, arrythmia, DM-2 diet controlled, HTN was sent to the ED from [facility] for low Hb. As per daughter at bed side, patient has been noticed to have shortness of breath with minimal exertion in the past 2-3 days. Patient has no chest pain, palpitation, hematuria, hematochezia, melena, abdominal pain, change in bowel/urinary habit, dizziness. (07 Nov 2019 20:47). He states that he had a negative colonoscopy done roughly five years ago but has had no prior EGD's. He has been anemic for the past two years and was transfused PRBC's roughly two years ago. He takes oral iorn 1 to 2 tablets daily and has had dark stools at home likely iron stained. He presented here with a Hb of 6.7 grams and was transfused two units to a HB of 8.8 grams this AM.      REVIEW OF SYSTEMS:  Constitutional: As per HPI.  ENMT:  No difficulty hearing, tinnitus, vertigo; No sinus or throat pain  Respiratory: As per HPI. RICHARDS  Cardiovascular: No chest pain, palpitations, dizziness or leg swelling  Gastrointestinal: As per HPI.No abdominal or epigastric pain. No nausea, vomiting or hematemesis; No diarrhea or constipation. No melena or hematochezia.  Skin: No itching, burning, rashes or lesions   Musculoskeletal: No joint pain or swelling; No muscle, back or extremity pain  Patient has no peripheral vascular, musculoskeletal, dermatological, neurological, or psychological symptoms or complaints at this time.    PAST MEDICAL & SURGICAL HISTORY:  Risk factors for obstructive sleep apnea  Anemia  RICHARDS (dyspnea on exertion)  VT (ventricular tachycardia)  HTN (hypertension)  CAD (coronary artery disease)  CKD (chronic kidney disease): stage IV  Arrhythmia  AV block, 1st degree  DM (diabetes mellitus)  H/O carotid endarterectomy: Right  A-V fistula: left arm 5/2017 but never used. Pt. does not receive HD.  H/O angioplasty: 2013,  no  intervention  H/O left knee surgery  H/O circumcision: at  age  65      FAMILY HISTORY:  Family history of premature CAD  Family history of lung cancer  Family history of cancer      SOCIAL HISTORY:  Smoking Status: [ ] Current, [ ] Former, [x ] Never  Pack Years: N/A. No ETOH or drug abuse history.    MEDICATIONS:  MEDICATIONS  (STANDING):  ascorbic acid 500 milliGRAM(s) Oral daily  atorvastatin 40 milliGRAM(s) Oral at bedtime  calcitriol   Capsule 0.25 MICROGram(s) Oral daily  epoetin juan Injectable 01248 Unit(s) SubCutaneous <User Schedule>  ferrous    sulfate 325 milliGRAM(s) Oral daily  flecainide 100 milliGRAM(s) Oral two times a day  hydrALAZINE 100 milliGRAM(s) Oral three times a day  iron sucrose IVPB 200 milliGRAM(s) IV Intermittent every 24 hours  multivitamin 1 Tablet(s) Oral daily  NIFEdipine XL 90 milliGRAM(s) Oral daily  pantoprazole    Tablet 40 milliGRAM(s) Oral before breakfast  torsemide 20 milliGRAM(s) Oral daily    MEDICATIONS  (PRN):  hydrALAZINE Injectable 10 milliGRAM(s) IV Push every 4 hours PRN sbp>170      Allergies    Plavix (Hives)  Toprol-XL (Rash)    Intolerances        Vital Signs Last 24 Hrs  T(C): 36.7 (08 Nov 2019 09:54), Max: 37.3 (07 Nov 2019 23:52)  T(F): 98.1 (08 Nov 2019 09:54), Max: 99.1 (07 Nov 2019 23:52)  HR: 87 (08 Nov 2019 09:54) (70 - 87)  BP: 206/60 (08 Nov 2019 09:54) (130/60 - 206/60)  BP(mean): --  RR: 18 (08 Nov 2019 09:54) (16 - 20)  SpO2: 98% (08 Nov 2019 09:54) (93% - 98%)    11-07 @ 07:01  -  11-08 @ 07:00  --------------------------------------------------------  IN: 646 mL / OUT: 450 mL / NET: 196 mL          PHYSICAL EXAM:    General: Well developed; well nourished; in no acute distress  HEENT: MMM, conjunctiva pink and sclera anicteric.  Lungs: Clear bilaterally  Cor: RRR S1, S2 only.  Gastrointestinal: Abdomen: Soft, non-tender non-distended; Normal bowel sounds; No rebound or guarding or palpable HSM.  ANDRE: Slightly decreased sphincter tone, + prostatic hypertrophy, iron stained stools. Not melenic and w/o BRB in rectal vault.  Extremities: Normal range of motion, No clubbing, cyanosis or edema  Neurological: Alert and oriented x3  Skin: Warm and dry. No obvious rash      LABS:                        8.8    9.94  )-----------( 215      ( 08 Nov 2019 10:44 )             28.4     11-08    143  |  110<H>  |  106.0<H>  ----------------------------<  116<H>  4.1   |  17.0<L>  |  4.66<H>    Ca    8.6      08 Nov 2019 04:50  Mg     2.4     11-07    TPro  6.5<L>  /  Alb  3.7  /  TBili  0.3<L>  /  DBili  x   /  AST  18  /  ALT  26  /  AlkPhos  88  11-07          RADIOLOGY & ADDITIONAL STUDIES:

## 2019-11-08 NOTE — PROGRESS NOTE ADULT - SUBJECTIVE AND OBJECTIVE BOX
seen for CKD5, anemia    no acute complaints.  ambulating w/o dizziness, dyspnea or chest pain  ros otherwise negative     MEDICATIONS  (STANDING):  ascorbic acid 500 milliGRAM(s) Oral daily  atorvastatin 40 milliGRAM(s) Oral at bedtime  calcitriol   Capsule 0.25 MICROGram(s) Oral daily  epoetin juan Injectable 41583 Unit(s) SubCutaneous <User Schedule>  ferrous    sulfate 325 milliGRAM(s) Oral daily  flecainide 100 milliGRAM(s) Oral two times a day  hydrALAZINE 100 milliGRAM(s) Oral three times a day  iron sucrose IVPB 200 milliGRAM(s) IV Intermittent every 24 hours  multivitamin 1 Tablet(s) Oral daily  NIFEdipine XL 90 milliGRAM(s) Oral daily  pantoprazole    Tablet 40 milliGRAM(s) Oral before breakfast  torsemide 20 milliGRAM(s) Oral daily    MEDICATIONS  (PRN):  hydrALAZINE Injectable 10 milliGRAM(s) IV Push every 4 hours PRN sbp>170      Allergies    Plavix (Hives)  Toprol-XL (Rash)    Vital Signs Last 24 Hrs  T(C): 36.7 (08 Nov 2019 09:54), Max: 37.3 (07 Nov 2019 23:52)  T(F): 98.1 (08 Nov 2019 09:54), Max: 99.1 (07 Nov 2019 23:52)  HR: 87 (08 Nov 2019 09:54) (70 - 87)  BP: 206/60 (08 Nov 2019 09:54) (130/60 - 206/60)  BP(mean): --  RR: 18 (08 Nov 2019 09:54) (16 - 20)  SpO2: 98% (08 Nov 2019 09:54) (93% - 98%)    PHYSICAL EXAM:    GENERAL: NAD  CHEST/LUNG: Clear but faint crackles at bases  HEART: Regular rate and rhythm; S1 S2  ABDOMEN: Soft, Nontender ; Bowel sounds present  EXTREMITIES:  no edema   NERVOUS SYSTEM:  Alert & Oriented X3, Motor Strength 5/5 B/L upper and lower extremities  PSYCH: normal mood, appropriate response.    LABS:                        7.6    6.84  )-----------( 157      ( 08 Nov 2019 04:50 )             24.6     11-08    143  |  110<H>  |  106.0<H>  ----------------------------<  116<H>  4.1   |  17.0<L>  |  4.66<H>    Ca    8.6      08 Nov 2019 04:50  Mg     2.4     11-07    TPro  6.5<L>  /  Alb  3.7  /  TBili  0.3<L>  /  DBili  x   /  AST  18  /  ALT  26  /  AlkPhos  88  11-07    PT/INR - ( 07 Nov 2019 16:16 )   PT: 11.6 sec;   INR: 1.01 ratio         PTT - ( 07 Nov 2019 16:16 )  PTT:35.0 sec      CAPILLARY BLOOD GLUCOSE            RADIOLOGY & ADDITIONAL TESTS:

## 2019-11-09 ENCOUNTER — TRANSCRIPTION ENCOUNTER (OUTPATIENT)
Age: 84
End: 2019-11-09

## 2019-11-09 DIAGNOSIS — N18.5 CHRONIC KIDNEY DISEASE, STAGE 5: ICD-10-CM

## 2019-11-09 DIAGNOSIS — N18.9 CHRONIC KIDNEY DISEASE, UNSPECIFIED: ICD-10-CM

## 2019-11-09 DIAGNOSIS — I10 ESSENTIAL (PRIMARY) HYPERTENSION: ICD-10-CM

## 2019-11-09 LAB
ANION GAP SERPL CALC-SCNC: 18 MMOL/L — HIGH (ref 5–17)
BASOPHILS # BLD AUTO: 0.04 K/UL — SIGNIFICANT CHANGE UP (ref 0–0.2)
BASOPHILS NFR BLD AUTO: 0.5 % — SIGNIFICANT CHANGE UP (ref 0–2)
BLD GP AB SCN SERPL QL: SIGNIFICANT CHANGE UP
BUN SERPL-MCNC: 144 MG/DL — HIGH (ref 8–20)
CALCIUM SERPL-MCNC: 9 MG/DL — SIGNIFICANT CHANGE UP (ref 8.6–10.2)
CHLORIDE SERPL-SCNC: 106 MMOL/L — SIGNIFICANT CHANGE UP (ref 98–107)
CO2 SERPL-SCNC: 16 MMOL/L — LOW (ref 22–29)
CREAT SERPL-MCNC: 5.15 MG/DL — HIGH (ref 0.5–1.3)
EOSINOPHIL # BLD AUTO: 0.15 K/UL — SIGNIFICANT CHANGE UP (ref 0–0.5)
EOSINOPHIL NFR BLD AUTO: 2 % — SIGNIFICANT CHANGE UP (ref 0–6)
GLUCOSE SERPL-MCNC: 111 MG/DL — SIGNIFICANT CHANGE UP (ref 70–115)
HCT VFR BLD CALC: 21 % — CRITICAL LOW (ref 39–50)
HCT VFR BLD CALC: 23.5 % — LOW (ref 39–50)
HCT VFR BLD CALC: 24.7 % — LOW (ref 39–50)
HGB BLD-MCNC: 6.4 G/DL — CRITICAL LOW (ref 13–17)
HGB BLD-MCNC: 7.1 G/DL — LOW (ref 13–17)
HGB BLD-MCNC: 7.9 G/DL — LOW (ref 13–17)
IMM GRANULOCYTES NFR BLD AUTO: 0.5 % — SIGNIFICANT CHANGE UP (ref 0–1.5)
LYMPHOCYTES # BLD AUTO: 1.11 K/UL — SIGNIFICANT CHANGE UP (ref 1–3.3)
LYMPHOCYTES # BLD AUTO: 15 % — SIGNIFICANT CHANGE UP (ref 13–44)
MCHC RBC-ENTMCNC: 29 PG — SIGNIFICANT CHANGE UP (ref 27–34)
MCHC RBC-ENTMCNC: 29.4 PG — SIGNIFICANT CHANGE UP (ref 27–34)
MCHC RBC-ENTMCNC: 30.2 GM/DL — LOW (ref 32–36)
MCHC RBC-ENTMCNC: 30.5 GM/DL — LOW (ref 32–36)
MCHC RBC-ENTMCNC: 30.7 PG — SIGNIFICANT CHANGE UP (ref 27–34)
MCHC RBC-ENTMCNC: 32 GM/DL — SIGNIFICANT CHANGE UP (ref 32–36)
MCV RBC AUTO: 95.9 FL — SIGNIFICANT CHANGE UP (ref 80–100)
MCV RBC AUTO: 96.1 FL — SIGNIFICANT CHANGE UP (ref 80–100)
MCV RBC AUTO: 96.3 FL — SIGNIFICANT CHANGE UP (ref 80–100)
MONOCYTES # BLD AUTO: 0.73 K/UL — SIGNIFICANT CHANGE UP (ref 0–0.9)
MONOCYTES NFR BLD AUTO: 9.9 % — SIGNIFICANT CHANGE UP (ref 2–14)
NEUTROPHILS # BLD AUTO: 5.34 K/UL — SIGNIFICANT CHANGE UP (ref 1.8–7.4)
NEUTROPHILS NFR BLD AUTO: 72.1 % — SIGNIFICANT CHANGE UP (ref 43–77)
PLATELET # BLD AUTO: 164 K/UL — SIGNIFICANT CHANGE UP (ref 150–400)
PLATELET # BLD AUTO: 173 K/UL — SIGNIFICANT CHANGE UP (ref 150–400)
PLATELET # BLD AUTO: 202 K/UL — SIGNIFICANT CHANGE UP (ref 150–400)
POTASSIUM SERPL-MCNC: 4 MMOL/L — SIGNIFICANT CHANGE UP (ref 3.5–5.3)
POTASSIUM SERPL-SCNC: 4 MMOL/L — SIGNIFICANT CHANGE UP (ref 3.5–5.3)
RBC # BLD: 2.18 M/UL — LOW (ref 4.2–5.8)
RBC # BLD: 2.45 M/UL — LOW (ref 4.2–5.8)
RBC # BLD: 2.57 M/UL — LOW (ref 4.2–5.8)
RBC # FLD: 16.4 % — HIGH (ref 10.3–14.5)
RBC # FLD: 17.5 % — HIGH (ref 10.3–14.5)
RBC # FLD: 17.6 % — HIGH (ref 10.3–14.5)
SODIUM SERPL-SCNC: 140 MMOL/L — SIGNIFICANT CHANGE UP (ref 135–145)
WBC # BLD: 7.41 K/UL — SIGNIFICANT CHANGE UP (ref 3.8–10.5)
WBC # BLD: 8.39 K/UL — SIGNIFICANT CHANGE UP (ref 3.8–10.5)
WBC # BLD: 8.54 K/UL — SIGNIFICANT CHANGE UP (ref 3.8–10.5)
WBC # FLD AUTO: 7.41 K/UL — SIGNIFICANT CHANGE UP (ref 3.8–10.5)
WBC # FLD AUTO: 8.39 K/UL — SIGNIFICANT CHANGE UP (ref 3.8–10.5)
WBC # FLD AUTO: 8.54 K/UL — SIGNIFICANT CHANGE UP (ref 3.8–10.5)

## 2019-11-09 PROCEDURE — 99233 SBSQ HOSP IP/OBS HIGH 50: CPT

## 2019-11-09 RX ORDER — FUROSEMIDE 40 MG
20 TABLET ORAL ONCE
Refills: 0 | Status: COMPLETED | OUTPATIENT
Start: 2019-11-09 | End: 2019-11-10

## 2019-11-09 RX ORDER — FUROSEMIDE 40 MG
20 TABLET ORAL ONCE
Refills: 0 | Status: COMPLETED | OUTPATIENT
Start: 2019-11-09 | End: 2020-10-07

## 2019-11-09 RX ORDER — FUROSEMIDE 40 MG
20 TABLET ORAL ONCE
Refills: 0 | Status: COMPLETED | OUTPATIENT
Start: 2019-11-09 | End: 2019-11-09

## 2019-11-09 RX ADMIN — ATORVASTATIN CALCIUM 40 MILLIGRAM(S): 80 TABLET, FILM COATED ORAL at 21:22

## 2019-11-09 RX ADMIN — PANTOPRAZOLE SODIUM 40 MILLIGRAM(S): 20 TABLET, DELAYED RELEASE ORAL at 06:26

## 2019-11-09 RX ADMIN — Medication 100 MILLIGRAM(S): at 21:22

## 2019-11-09 RX ADMIN — Medication 500 MILLIGRAM(S): at 10:06

## 2019-11-09 RX ADMIN — Medication 90 MILLIGRAM(S): at 06:26

## 2019-11-09 RX ADMIN — Medication 20 MILLIGRAM(S): at 17:53

## 2019-11-09 RX ADMIN — CALCITRIOL 0.25 MICROGRAM(S): 0.5 CAPSULE ORAL at 10:06

## 2019-11-09 RX ADMIN — Medication 20 MILLIGRAM(S): at 06:26

## 2019-11-09 RX ADMIN — Medication 100 MILLIGRAM(S): at 10:06

## 2019-11-09 RX ADMIN — Medication 100 MILLIGRAM(S): at 15:16

## 2019-11-09 RX ADMIN — Medication 1 TABLET(S): at 10:06

## 2019-11-09 RX ADMIN — Medication 325 MILLIGRAM(S): at 10:06

## 2019-11-09 RX ADMIN — Medication 100 MILLIGRAM(S): at 06:26

## 2019-11-09 NOTE — PROGRESS NOTE ADULT - SUBJECTIVE AND OBJECTIVE BOX
[place] [facility]ARDIOVASCULAR [facility], THE HEART CENTER                                   [address] [place] [address]                                                      PHONE: [phone]                                                         FAX: [phone]  [email or web address]/[email or web address]/[email or web address].html  ---------------------------------------------------------------------------------------------------------------------------------    FU for  Pt seen and examined.     Overnight events/patient complaints:    Plavix (Hives)  Toprol-XL (Rash)    MEDICATIONS  (STANDING):  ascorbic acid 500 milliGRAM(s) Oral daily  atorvastatin 40 milliGRAM(s) Oral at bedtime  calcitriol   Capsule 0.25 MICROGram(s) Oral daily  epoetin juan Injectable 06612 Unit(s) SubCutaneous <User Schedule>  ferrous    sulfate 325 milliGRAM(s) Oral daily  flecainide 100 milliGRAM(s) Oral two times a day  hydrALAZINE 100 milliGRAM(s) Oral three times a day  iron sucrose IVPB 200 milliGRAM(s) IV Intermittent every 24 hours  multivitamin 1 Tablet(s) Oral daily  NIFEdipine XL 90 milliGRAM(s) Oral daily  pantoprazole    Tablet 40 milliGRAM(s) Oral before breakfast  torsemide 20 milliGRAM(s) Oral daily    MEDICATIONS  (PRN):  hydrALAZINE Injectable 10 milliGRAM(s) IV Push every 4 hours PRN sbp>170      Vital Signs Last 24 Hrs  T(C): 36.7 (09 Nov 2019 06:22), Max: 37.2 (08 Nov 2019 22:05)  T(F): 98 (09 Nov 2019 06:22), Max: 98.9 (08 Nov 2019 22:05)  HR: 72 (09 Nov 2019 06:22) (72 - 87)  BP: 142/58 (09 Nov 2019 06:22) (126/40 - 206/60)  BP(mean): --  RR: 18 (09 Nov 2019 06:22) (18 - 18)  SpO2: 96% (09 Nov 2019 06:22) (94% - 98%)  ICU Vital Signs Last 24 Hrs  I&O's Summary    08 Nov 2019 07:01  -  09 Nov 2019 07:00  --------------------------------------------------------  IN: 220 mL / OUT: 150 mL / NET: 70 mL        PHYSICAL EXAM:  General: well built, resting comfortably  HEENT: no JVD  CARDIOVASCULAR: as+  LUNGS: No rales, rhonchi or wheeze. Normal breath sounds bilaterally.  ABDOMEN: Soft, nontender without mass or organomegaly. bowel sounds normoactive.  EXTREMITIES: no edema  Neuro: awake alert          LABS:                        8.8    9.94  )-----------( 215      ( 08 Nov 2019 10:44 )             28.4     11-09    140  |  106  |  x   ----------------------------<  111  4.0   |  16.0<L>  |  5.15<H>    Ca    9.0      09 Nov 2019 06:46  Mg     2.4     11-07    TPro  6.5<L>  /  Alb  3.7  /  TBili  0.3<L>  /  DBili  x   /  AST  18  /  ALT  26  /  AlkPhos  88  11-07    CHRISTIANO ELIZABETH  CARDIAC MARKERS ( 07 Nov 2019 16:16 )  x     / 0.06 ng/mL / x     / x     / x          PT/INR - ( 07 Nov 2019 16:16 )   PT: 11.6 sec;   INR: 1.01 ratio         PTT - ( 07 Nov 2019 16:16 )  PTT:35.0 sec      RADIOLOGY & ADDITIONAL STUDIES:    LABS:                        8.8    9.94  )-----------( 215      ( 08 Nov 2019 10:44 )             28.4     11-09    140  |  106  |  x   ----------------------------<  111  4.0   |  16.0<L>  |  5.15<H>    Ca    9.0      09 Nov 2019 06:46  Mg     2.4     11-07    TPro  6.5<L>  /  Alb  3.7  /  TBili  0.3<L>  /  DBili  x   /  AST  18  /  ALT  26  /  AlkPhos  88  11-07    85y  CARDIAC MARKERS ( 07 Nov 2019 16:16 )  x     / 0.06 ng/mL / x     / x     / x          PT/INR - ( 07 Nov 2019 16:16 )   PT: 11.6 sec;   INR: 1.01 ratio         PTT - ( 07 Nov 2019 16:16 )  PTT:35.0 sec    ECHO: 8/2019  EF > 75% DDD LVH moderate AS DOI 0.34 mean gradient 22 mmHg MAC RVSP 39 mmHg       STRESS TEST: PET 2/2018 normal perfusion     CARDIAC CATHETERIZATION:  VENTRICLES: LV not done due to Cr 3.5  CORONARY VESSELS: The coronary circulation is right dominant.  LM:   --  LM: Normal.  LAD:   --  Proximal LAD: There was a 40 % stenosis.  --  Mid LAD: There was a 30 % stenosis.  --  Distal LAD: Angiography showed minor luminal irregularities withno  flow limiting lesions.  CX:   --  Mid circumflex: There was a 50 % stenosis.  --  OM1: There was a 30 % stenosis at the ostium of the vessel segment.  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  --  Mid RCA: There was a 70 % stenosis.  COMPLICATIONS: There were no complications. No complications occurred  during the cath lab visit.  DIAGNOSTIC IMPRESSIONS: Probably significant RCA disease with unusual take  off with non obstructive CAD of LCX and LAD  DIAGNOSTIC RECOMMENDATIONS: Nuclear PET SCAN. Possible future PCI The  patient should continue with the present medications.  INTERVENTIONAL IMPRESSIONS: Probably significant RCA disease with unusual  take off with non obstructive CAD of LCX and LAD  INTERVENTIONAL RECOMMENDATIONS: Nuclear PET SCAN. Possible future PCI  Prepared and signed by  Star Manriquez MD      ASSESSMENT AND PLAN:  86 y/o man with HTN, HLD, moderate CAD none obstructive PET CT 2/2018 normal perfusion, hx of VT (no ICD on Flecainide), chronic HFpEF LVH moderate AS lasted TTE 8/2019 see above CKD IV s/p fistula placement, Anemia, DM, carotid stenosis s/p CEA, chronic anemia on Aransep s/p prior transfusions of pRBC, last admitted 2/2019 with acute on chronic symptomatic anemia in which symptoms improved with PRBC.    PET CT 2/2018 normal perfusion   TTE 8/2019 hyperdynamic LV EF moderate AS [place] [facility]IOVASC[facility] [place], THE HEART CENTER                                   [address] [place] [address]                                                      PHONE: [phone]                                                         FAX: [phone]  [email or web address]/[email or web address]/[email or web address].[email or web address]  ---------------------------------------------------------------------------------------------------------------------------------    FU for  Pt seen and examined. SOB improved    Overnight events/patient complaints:    Plavix (Hives)  Toprol-XL (Rash)    MEDICATIONS  (STANDING):  ascorbic acid 500 milliGRAM(s) Oral daily  atorvastatin 40 milliGRAM(s) Oral at bedtime  calcitriol   Capsule 0.25 MICROGram(s) Oral daily  epoetin juan Injectable 35325 Unit(s) SubCutaneous <User Schedule>  ferrous    sulfate 325 milliGRAM(s) Oral daily  flecainide 100 milliGRAM(s) Oral two times a day  hydrALAZINE 100 milliGRAM(s) Oral three times a day  iron sucrose IVPB 200 milliGRAM(s) IV Intermittent every 24 hours  multivitamin 1 Tablet(s) Oral daily  NIFEdipine XL 90 milliGRAM(s) Oral daily  pantoprazole    Tablet 40 milliGRAM(s) Oral before breakfast  torsemide 20 milliGRAM(s) Oral daily    MEDICATIONS  (PRN):  hydrALAZINE Injectable 10 milliGRAM(s) IV Push every 4 hours PRN sbp>170      Vital Signs Last 24 Hrs  T(C): 36.7 (09 Nov 2019 06:22), Max: 37.2 (08 Nov 2019 22:05)  T(F): 98 (09 Nov 2019 06:22), Max: 98.9 (08 Nov 2019 22:05)  HR: 72 (09 Nov 2019 06:22) (72 - 87)  BP: 142/58 (09 Nov 2019 06:22) (126/40 - 206/60)  BP(mean): --  RR: 18 (09 Nov 2019 06:22) (18 - 18)  SpO2: 96% (09 Nov 2019 06:22) (94% - 98%)  ICU Vital Signs Last 24 Hrs  I&O's Summary    08 Nov 2019 07:01  -  09 Nov 2019 07:00  --------------------------------------------------------  IN: 220 mL / OUT: 150 mL / NET: 70 mL        PHYSICAL EXAM:  General: well built, resting comfortably  HEENT: no JVD  CARDIOVASCULAR: as+  LUNGS: No rales, rhonchi or wheeze. Normal breath sounds bilaterally.  ABDOMEN: Soft, nontender without mass or organomegaly. bowel sounds normoactive.  EXTREMITIES: no edema  Neuro: awake alert          LABS:                        8.8    9.94  )-----------( 215      ( 08 Nov 2019 10:44 )             28.4     11-09    140  |  106  |  x   ----------------------------<  111  4.0   |  16.0<L>  |  5.15<H>    Ca    9.0      09 Nov 2019 06:46  Mg     2.4     11-07    TPro  6.5<L>  /  Alb  3.7  /  TBili  0.3<L>  /  DBili  x   /  AST  18  /  ALT  26  /  AlkPhos  88  11-07    CHRISTIANO ELIZABETH  CARDIAC MARKERS ( 07 Nov 2019 16:16 )  x     / 0.06 ng/mL / x     / x     / x          PT/INR - ( 07 Nov 2019 16:16 )   PT: 11.6 sec;   INR: 1.01 ratio         PTT - ( 07 Nov 2019 16:16 )  PTT:35.0 sec      RADIOLOGY & ADDITIONAL STUDIES:    LABS:                        8.8    9.94  )-----------( 215      ( 08 Nov 2019 10:44 )             28.4     11-09    140  |  106  |  x   ----------------------------<  111  4.0   |  16.0<L>  |  5.15<H>    Ca    9.0      09 Nov 2019 06:46  Mg     2.4     11-07    TPro  6.5<L>  /  Alb  3.7  /  TBili  0.3<L>  /  DBili  x   /  AST  18  /  ALT  26  /  AlkPhos  88  11-07    85y  CARDIAC MARKERS ( 07 Nov 2019 16:16 )  x     / 0.06 ng/mL / x     / x     / x          PT/INR - ( 07 Nov 2019 16:16 )   PT: 11.6 sec;   INR: 1.01 ratio         PTT - ( 07 Nov 2019 16:16 )  PTT:35.0 sec    ECHO: 8/2019  EF > 75% DDD LVH moderate AS DOI 0.34 mean gradient 22 mmHg MAC RVSP 39 mmHg       STRESS TEST: PET 2/2018 normal perfusion     CARDIAC CATHETERIZATION:  VENTRICLES: LV not done due to Cr 3.5  CORONARY VESSELS: The coronary circulation is right dominant.  LM:   --  LM: Normal.  LAD:   --  Proximal LAD: There was a 40 % stenosis.  --  Mid LAD: There was a 30 % stenosis.  --  Distal LAD: Angiography showed minor luminal irregularities withno  flow limiting lesions.  CX:   --  Mid circumflex: There was a 50 % stenosis.  --  OM1: There was a 30 % stenosis at the ostium of the vessel segment.  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  --  Mid RCA: There was a 70 % stenosis.  COMPLICATIONS: There were no complications. No complications occurred  during the cath lab visit.  DIAGNOSTIC IMPRESSIONS: Probably significant RCA disease with unusual take  off with non obstructive CAD of LCX and LAD  DIAGNOSTIC RECOMMENDATIONS: Nuclear PET SCAN. Possible future PCI The  patient should continue with the present medications.  INTERVENTIONAL IMPRESSIONS: Probably significant RCA disease with unusual  take off with non obstructive CAD of LCX and LAD  INTERVENTIONAL RECOMMENDATIONS: Nuclear PET SCAN. Possible future PCI  Prepared and signed by  Star Manriquez MD      ASSESSMENT AND PLAN:  84 y/o man with HTN, HLD, moderate CAD none obstructive PET CT 2/2018 normal perfusion, hx of VT (no ICD on Flecainide), chronic HFpEF LVH moderate AS lasted TTE 8/2019 see above CKD IV s/p fistula placement, Anemia, DM, carotid stenosis s/p CEA, chronic anemia on Aransep s/p prior transfusions of pRBC, last admitted 2/2019 with acute on chronic symptomatic anemia in which symptoms improved with PRBC.    PET CT 2/2018 normal perfusion   TTE 8/2019 hyperdynamic LV EF moderate AS   SOB: likely multifactorial, AS with anemia as major contributor. Fluid status stable on torsamide  Anemia: s/p transfusion

## 2019-11-09 NOTE — PROGRESS NOTE ADULT - SUBJECTIVE AND OBJECTIVE BOX
Patient: CHRISTIANO ELIZABETH [id number] 85y Male                           Internal Medicine Hospitalist Progress Note    Initial HPI:  84 y/o male with PMH of CKD-5 not on HD, Anemia of chronic disease on Aranesp, CHFpEF, arrythmia, DM-2 diet controlled, HTN was sent to the ED from [facility] for low Hb.  He has had increasing SOB and RICHARDS over past several weeks.  Noted Hgb 6.7 on admission with stool OB positive.  Wife reports he has had foul smelling dark stool, belching same odor, over past several weeks.      Interval History:  Seen with daughter and wife at bedside.  Denies bleeding.  No chest pain / palpitations. No SOB.  No additional complaints.     ____________________PHYSICAL EXAM:  Vitals reviewed as indicated below  GENERAL:  NAD Alert and Oriented x 3   HEENT: NCAT  CARDIOVASCULAR:  S1, S2  LUNGS: CTAB  ABDOMEN:  soft, (-) tenderness, (-) distension, (+) bowel sounds, (-) guarding, (-) rebound (-) rigidity  EXTREMITIES:  no cyanosis / clubbing.  + edema.   ____________________      BACKGROUND:  HEALTH ISSUES - PROBLEM Dx:  Chronic kidney disease-mineral and bone disorder: Chronic kidney disease-mineral and bone disorder  HTN (hypertension): HTN (hypertension)  Anemia due to stage 5 chronic kidney disease, not on chronic dialysis: Anemia due to stage 5 chronic kidney disease, not on chronic dialysis  Stage 5 chronic kidney disease not on chronic dialysis: Stage 5 chronic kidney disease not on chronic dialysis  Occult blood positive stool: Occult blood positive stool  Symptomatic anemia: Symptomatic anemia        Allergies    Plavix (Hives)  Toprol-XL (Rash)    Intolerances      PAST MEDICAL & SURGICAL HISTORY:  Risk factors for obstructive sleep apnea  Anemia  RICHARDS (dyspnea on exertion)  VT (ventricular tachycardia)  HTN (hypertension)  CAD (coronary artery disease)  CKD (chronic kidney disease): stage IV  Arrhythmia  AV block, 1st degree  DM (diabetes mellitus)  H/O carotid endarterectomy: Right  A-V fistula: left arm 2017  H/O angioplasty: ,  no  intervention  H/O left knee surgery  H/O circumcision: at  age  65        VITALS:  Vital Signs Last 24 Hrs  T(C): 36.6 (2019 13:45), Max: 37.2 (2019 22:05)  T(F): 97.8 (2019 13:45), Max: 98.9 (2019 22:05)  HR: 67 (2019 13:45) (67 - 85)  BP: 124/52 (:45) (110/50 - 168/58)  BP(mean): --  RR: 16 (:45) (16 - 18)  SpO2: 95% (:45) (94% - 98%) Daily     Daily Weight in k.9 (2019 06:59)  CAPILLARY BLOOD GLUCOSE        I&O's Summary    2019 07:01  -  2019 07:00  --------------------------------------------------------  IN: 220 mL / OUT: 150 mL / NET: 70 mL    2019 07:01  -  2019 13:50  --------------------------------------------------------  IN: 240 mL / OUT: 0 mL / NET: 240 mL          LABS:                        7.1    8.54  )-----------( 202      ( 2019 09:32 )             23.5     11-09    140  |  106  |  144.0<H>  ----------------------------<  111  4.0   |  16.0<L>  |  5.15<H>    Ca    9.0      2019 06:46  Mg     2.4     11    TPro  6.5<L>  /  Alb  3.7  /  TBili  0.3<L>  /  DBili  x   /  AST  18  /  ALT  26  /  AlkPhos  88  11-07    PT/INR - ( 2019 16:16 )   PT: 11.6 sec;   INR: 1.01 ratio         PTT - ( 2019 16:16 )  PTT:35.0 sec  LIVER FUNCTIONS - ( 2019 16:16 )  Alb: 3.7 g/dL / Pro: 6.5 g/dL / ALK PHOS: 88 U/L / ALT: 26 U/L / AST: 18 U/L / GGT: x             CARDIAC MARKERS ( 2019 16:16 )  x     / 0.06 ng/mL / x     / x     / x              MEDICATIONS:  MEDICATIONS  (STANDING):  ascorbic acid 500 milliGRAM(s) Oral daily  atorvastatin 40 milliGRAM(s) Oral at bedtime  calcitriol   Capsule 0.25 MICROGram(s) Oral daily  epoetin juan Injectable 32307 Unit(s) SubCutaneous <User Schedule>  ferrous    sulfate 325 milliGRAM(s) Oral daily  flecainide 100 milliGRAM(s) Oral two times a day  hydrALAZINE 100 milliGRAM(s) Oral three times a day  iron sucrose IVPB 200 milliGRAM(s) IV Intermittent every 24 hours  multivitamin 1 Tablet(s) Oral daily  NIFEdipine XL 90 milliGRAM(s) Oral daily  pantoprazole    Tablet 40 milliGRAM(s) Oral before breakfast  torsemide 20 milliGRAM(s) Oral daily    MEDICATIONS  (PRN):  hydrALAZINE Injectable 10 milliGRAM(s) IV Push every 4 hours PRN sbp>170

## 2019-11-09 NOTE — PROGRESS NOTE ADULT - PROBLEM SELECTOR PLAN 1
follows with Dr. Avendano; has a LUE AVF created last year  BUN worsening this AM, also with dropping H/H; ? UGIB- work up as per primary team,  No indication for HD at this time.

## 2019-11-09 NOTE — PROGRESS NOTE ADULT - SUBJECTIVE AND OBJECTIVE BOX
[facility] DIVISION OF KIDNEY DISEASES AND HYPERTENSION -- FOLLOW UP NOTE  --------------------------------------------------------------------------------  Chief Complaint: CKD stage V    24 hour events/subjective:  Pt s/p 2 U PRBC yesterday  Seen and examined this AM, states he feels well.        PAST HISTORY  --------------------------------------------------------------------------------  No significant changes to PMH, PSH, FHx, SHx, unless otherwise noted    ALLERGIES & MEDICATIONS  --------------------------------------------------------------------------------  Allergies    Plavix (Hives)  Toprol-XL (Rash)    Intolerances      Standing Inpatient Medications  ascorbic acid 500 milliGRAM(s) Oral daily  atorvastatin 40 milliGRAM(s) Oral at bedtime  calcitriol   Capsule 0.25 MICROGram(s) Oral daily  epoetin juan Injectable 91586 Unit(s) SubCutaneous <User Schedule>  ferrous    sulfate 325 milliGRAM(s) Oral daily  flecainide 100 milliGRAM(s) Oral two times a day  hydrALAZINE 100 milliGRAM(s) Oral three times a day  iron sucrose IVPB 200 milliGRAM(s) IV Intermittent every 24 hours  multivitamin 1 Tablet(s) Oral daily  NIFEdipine XL 90 milliGRAM(s) Oral daily  pantoprazole    Tablet 40 milliGRAM(s) Oral before breakfast  torsemide 20 milliGRAM(s) Oral daily    PRN Inpatient Medications  hydrALAZINE Injectable 10 milliGRAM(s) IV Push every 4 hours PRN      REVIEW OF SYSTEMS  --------------------------------------------------------------------------------    Gen: No weight changes, fatigue+ No fevers/chills, weakness  Skin: No rashes  Head/Eyes/Ears/Mouth: No headache; Normal hearing; Normal vision w/o blurriness; No sinus pain/discomfort, sore throat  Respiratory:  dyspnea+ No cough, wheezing, hemoptysis  CV: No chest pain, PND, orthopnea  GI: No abdominal pain, diarrhea, constipation, nausea, vomiting, black stools+  : No increased frequency, dysuria, hematuria, nocturia  MSK: No joint pain/swelling; no back pain; no edema  Neuro: No dizziness/lightheadedness, weakness, seizures  Heme: No easy bruising or bleeding  Endo: No heat/cold intolerance  Psych: No significant nervousness, anxiety, stress, depression  VITALS/PHYSICAL EXAM  --------------------------------------------------------------------------------  T(C): 36.9 (11-09-19 @ 10:37), Max: 37.2 (11-08-19 @ 22:05)  HR: 74 (11-09-19 @ 10:37) (72 - 78)  BP: 150/52 (11-09-19 @ 10:37) (126/40 - 182/64)  RR: 18 (11-09-19 @ 10:37) (18 - 18)  SpO2: 98% (11-09-19 @ 10:37) (94% - 98%)  Wt(kg): --  Height (cm): 165.1 (11-08-19 @ 07:05)  Weight (kg): 70.5 (11-08-19 @ 07:05)  BMI (kg/m2): 25.9 (11-08-19 @ 07:05)  BSA (m2): 1.78 (11-08-19 @ 07:05)      11-08-19 @ 07:01  -  11-09-19 @ 07:00  --------------------------------------------------------  IN: 220 mL / OUT: 150 mL / NET: 70 mL    11-09-19 @ 07:01  -  11-09-19 @ 11:13  --------------------------------------------------------  IN: 240 mL / OUT: 0 mL / NET: 240 mL      Physical Exam:  	Gen: NAD,  	HEENT: supple neck, on room air  	Pulm: CTA B/L  	CV: RRR, S1S2; no rub  	Back: unable to examine  	Abd: +BS, soft, nontender/nondistended  	: No suprapubic tenderness  	UE: Warm,  no edema; no asterixis  	LE: Warm, ; no edema  	Neuro: No focal deficits  	Psych: Normal affect and mood  	Skin: Warm, dry, pale  	Vascular access: LUE AVF, thrill+ bruit+      LABS/STUDIES  --------------------------------------------------------------------------------              7.1    8.54  >-----------<  202      [11-09-19 @ 09:32]              23.5     140  |  106  |  144.0  ----------------------------<  111      [11-09-19 @ 06:46]  4.0   |  16.0  |  5.15        Ca     9.0     [11-09-19 @ 06:46]      Mg     2.4     [11-07-19 @ 16:16]    TPro  6.5  /  Alb  3.7  /  TBili  0.3  /  DBili  x   /  AST  18  /  ALT  26  /  AlkPhos  88  [11-07-19 @ 16:16]    PT/INR: PT 11.6 , INR 1.01       [11-07-19 @ 16:16]  PTT: 35.0       [11-07-19 @ 16:16]    Troponin 0.06      [11-07-19 @ 16:16]    Creatinine Trend:  SCr 5.15 [11-09 @ 06:46]  SCr 4.66 [11-08 @ 04:50]  SCr 4.74 [11-07 @ 16:16]        HbA1c 4.9      [08-09-18 @ 05:54]

## 2019-11-09 NOTE — PROGRESS NOTE ADULT - SUBJECTIVE AND OBJECTIVE BOX
INTERVAL HPI/OVERNIGHT EVENTS:FU for anemia. Hct dropped again. He had black formed bowel movement. On PPI once daily. No GI complaints.    MEDICATIONS  (STANDING):  ascorbic acid 500 milliGRAM(s) Oral daily  atorvastatin 40 milliGRAM(s) Oral at bedtime  calcitriol   Capsule 0.25 MICROGram(s) Oral daily  epoetin juan Injectable 57788 Unit(s) SubCutaneous <User Schedule>  ferrous    sulfate 325 milliGRAM(s) Oral daily  flecainide 100 milliGRAM(s) Oral two times a day  furosemide   Injectable 20 milliGRAM(s) IV Push once  hydrALAZINE 100 milliGRAM(s) Oral three times a day  iron sucrose IVPB 200 milliGRAM(s) IV Intermittent every 24 hours  multivitamin 1 Tablet(s) Oral daily  NIFEdipine XL 90 milliGRAM(s) Oral daily  pantoprazole    Tablet 40 milliGRAM(s) Oral before breakfast  torsemide 20 milliGRAM(s) Oral daily    MEDICATIONS  (PRN):  hydrALAZINE Injectable 10 milliGRAM(s) IV Push every 4 hours PRN sbp>170      Allergies    Plavix (Hives)  Toprol-XL (Rash)    Intolerances        Vital Signs Last 24 Hrs  T(C): 36.6 (09 Nov 2019 13:45), Max: 37.2 (08 Nov 2019 22:05)  T(F): 97.8 (09 Nov 2019 13:45), Max: 98.9 (08 Nov 2019 22:05)  HR: 63 (09 Nov 2019 15:20) (63 - 85)  BP: 120/44 (09 Nov 2019 15:20) (110/50 - 150/52)  BP(mean): --  RR: 16 (09 Nov 2019 15:20) (16 - 18)  SpO2: 95% (09 Nov 2019 15:20) (94% - 98%)    LABS:                        7.1    8.54  )-----------( 202      ( 09 Nov 2019 09:32 )             23.5     11-09    140  |  106  |  144.0<H>  ----------------------------<  111  4.0   |  16.0<L>  |  5.15<H>    Ca    9.0      09 Nov 2019 06:46  Mg     2.4     11-07    TPro  6.5<L>  /  Alb  3.7  /  TBili  0.3<L>  /  DBili  x   /  AST  18  /  ALT  26  /  AlkPhos  88  11-07    PT/INR - ( 07 Nov 2019 16:16 )   PT: 11.6 sec;   INR: 1.01 ratio         PTT - ( 07 Nov 2019 16:16 )  PTT:35.0 sec      RADIOLOGY & ADDITIONAL TESTS:

## 2019-11-10 ENCOUNTER — TRANSCRIPTION ENCOUNTER (OUTPATIENT)
Age: 84
End: 2019-11-10

## 2019-11-10 LAB
ANION GAP SERPL CALC-SCNC: 20 MMOL/L — HIGH (ref 5–17)
BUN SERPL-MCNC: 148 MG/DL — HIGH (ref 8–20)
CALCIUM SERPL-MCNC: 8.8 MG/DL — SIGNIFICANT CHANGE UP (ref 8.6–10.2)
CHLORIDE SERPL-SCNC: 104 MMOL/L — SIGNIFICANT CHANGE UP (ref 98–107)
CO2 SERPL-SCNC: 15 MMOL/L — LOW (ref 22–29)
CREAT SERPL-MCNC: 5.34 MG/DL — HIGH (ref 0.5–1.3)
GLUCOSE SERPL-MCNC: 87 MG/DL — SIGNIFICANT CHANGE UP (ref 70–115)
HCT VFR BLD CALC: 28.6 % — LOW (ref 39–50)
HGB BLD-MCNC: 9.3 G/DL — LOW (ref 13–17)
MCHC RBC-ENTMCNC: 30.4 PG — SIGNIFICANT CHANGE UP (ref 27–34)
MCHC RBC-ENTMCNC: 32.5 GM/DL — SIGNIFICANT CHANGE UP (ref 32–36)
MCV RBC AUTO: 93.5 FL — SIGNIFICANT CHANGE UP (ref 80–100)
PLATELET # BLD AUTO: 167 K/UL — SIGNIFICANT CHANGE UP (ref 150–400)
POTASSIUM SERPL-MCNC: 3.6 MMOL/L — SIGNIFICANT CHANGE UP (ref 3.5–5.3)
POTASSIUM SERPL-SCNC: 3.6 MMOL/L — SIGNIFICANT CHANGE UP (ref 3.5–5.3)
RBC # BLD: 3.06 M/UL — LOW (ref 4.2–5.8)
RBC # FLD: 17.1 % — HIGH (ref 10.3–14.5)
SODIUM SERPL-SCNC: 139 MMOL/L — SIGNIFICANT CHANGE UP (ref 135–145)
WBC # BLD: 8.9 K/UL — SIGNIFICANT CHANGE UP (ref 3.8–10.5)
WBC # FLD AUTO: 8.9 K/UL — SIGNIFICANT CHANGE UP (ref 3.8–10.5)

## 2019-11-10 PROCEDURE — 43239 EGD BIOPSY SINGLE/MULTIPLE: CPT

## 2019-11-10 PROCEDURE — 99232 SBSQ HOSP IP/OBS MODERATE 35: CPT

## 2019-11-10 PROCEDURE — 99233 SBSQ HOSP IP/OBS HIGH 50: CPT

## 2019-11-10 RX ORDER — FENTANYL CITRATE 50 UG/ML
25 INJECTION INTRAVENOUS
Refills: 0 | Status: DISCONTINUED | OUTPATIENT
Start: 2019-11-10 | End: 2019-11-10

## 2019-11-10 RX ORDER — ONDANSETRON 8 MG/1
4 TABLET, FILM COATED ORAL ONCE
Refills: 0 | Status: DISCONTINUED | OUTPATIENT
Start: 2019-11-10 | End: 2019-11-10

## 2019-11-10 RX ORDER — DEXAMETHASONE 0.5 MG/5ML
8 ELIXIR ORAL ONCE
Refills: 0 | Status: DISCONTINUED | OUTPATIENT
Start: 2019-11-10 | End: 2019-11-10

## 2019-11-10 RX ORDER — SOD SULF/SODIUM/NAHCO3/KCL/PEG
4000 SOLUTION, RECONSTITUTED, ORAL ORAL ONCE
Refills: 0 | Status: COMPLETED | OUTPATIENT
Start: 2019-11-10 | End: 2019-11-10

## 2019-11-10 RX ORDER — SODIUM CHLORIDE 9 MG/ML
1000 INJECTION INTRAMUSCULAR; INTRAVENOUS; SUBCUTANEOUS
Refills: 0 | Status: DISCONTINUED | OUTPATIENT
Start: 2019-11-10 | End: 2019-11-10

## 2019-11-10 RX ADMIN — IRON SUCROSE 110 MILLIGRAM(S): 20 INJECTION, SOLUTION INTRAVENOUS at 00:14

## 2019-11-10 RX ADMIN — ATORVASTATIN CALCIUM 40 MILLIGRAM(S): 80 TABLET, FILM COATED ORAL at 21:34

## 2019-11-10 RX ADMIN — Medication 500 MILLIGRAM(S): at 12:48

## 2019-11-10 RX ADMIN — CALCITRIOL 0.25 MICROGRAM(S): 0.5 CAPSULE ORAL at 12:48

## 2019-11-10 RX ADMIN — Medication 100 MILLIGRAM(S): at 12:53

## 2019-11-10 RX ADMIN — Medication 100 MILLIGRAM(S): at 21:33

## 2019-11-10 RX ADMIN — Medication 1 TABLET(S): at 12:48

## 2019-11-10 RX ADMIN — Medication 20 MILLIGRAM(S): at 05:11

## 2019-11-10 RX ADMIN — Medication 4000 MILLILITER(S): at 14:49

## 2019-11-10 RX ADMIN — Medication 100 MILLIGRAM(S): at 05:11

## 2019-11-10 RX ADMIN — Medication 100 MILLIGRAM(S): at 08:42

## 2019-11-10 RX ADMIN — IRON SUCROSE 110 MILLIGRAM(S): 20 INJECTION, SOLUTION INTRAVENOUS at 17:43

## 2019-11-10 RX ADMIN — Medication 100 MILLIGRAM(S): at 21:34

## 2019-11-10 RX ADMIN — PANTOPRAZOLE SODIUM 40 MILLIGRAM(S): 20 TABLET, DELAYED RELEASE ORAL at 05:11

## 2019-11-10 RX ADMIN — Medication 90 MILLIGRAM(S): at 05:11

## 2019-11-10 RX ADMIN — Medication 20 MILLIGRAM(S): at 00:14

## 2019-11-10 NOTE — PROGRESS NOTE ADULT - PROBLEM SELECTOR PLAN 1
follows with Dr. Avendano; has a LUE AVF created last year  BUN worsening this AM, also with dropping H/H; ? UGIB- work up as per primary team  No indication for HD at this time. follows with Dr. Avendano; has a LUE AVF created last year  Worsening BUN/Cr; no indication for HD at this time.

## 2019-11-10 NOTE — PROGRESS NOTE ADULT - SUBJECTIVE AND OBJECTIVE BOX
Patient: CHRISTIANO ELIZABETH [id number] 85y Male                           Internal Medicine Hospitalist Progress Note    Initial HPI:  86 y/o male with PMH of CKD-5 not on HD, Anemia of chronic disease on Aranesp, CHFpEF, arrythmia, DM-2 diet controlled, HTN was sent to the ED from [facility] for low Hb.  He has had increasing SOB and RICHARDS over past several weeks.  Noted Hgb 6.7 on admission with stool OB positive.  Wife reported he has had foul smelling dark stool, belching same odor, over past several weeks.  S/p total of 4 units of pRBCs.     Interval History:  S/p 2 additional units of PRBCs.  States he had several extremely small BMs with black stool.  No chest pain / palpitations. No SOB.  No additional complaints.     ____________________PHYSICAL EXAM:  Vitals reviewed as indicated below  GENERAL:  NAD Alert and Oriented x 3   HEENT: NCAT  CARDIOVASCULAR:  S1, S2  LUNGS: CTAB  ABDOMEN:  soft, (-) tenderness, (-) distension, (+) bowel sounds, (-) guarding, (-) rebound (-) rigidity  EXTREMITIES:  no cyanosis / clubbing.  1+ edema.   ____________________  VITALS:  Vital Signs Last 24 Hrs  T(C): 36.5 (10 Nov 2019 07:50), Max: 36.9 (2019 10:37)  T(F): 97.7 (10 Nov 2019 07:50), Max: 98.4 (2019 10:37)  HR: 64 (10 Nov 2019 07:50) (61 - 85)  BP: 134/46 (10 Nov 2019 07:50) (110/50 - 175/69)  BP(mean): --  RR: 16 (10 Nov 2019 07:50) (16 - 18)  SpO2: 100% (10 Nov 2019 05:08) (95% - 100%) Daily     Daily Weight in k.7 (10 Nov 2019 06:36)  CAPILLARY BLOOD GLUCOSE        I&O's Summary    2019 07:01  -  10 Nov 2019 07:00  --------------------------------------------------------  IN: 1157 mL / OUT: 0 mL / NET: 1157 mL        LABS:                        9.3    8.90  )-----------( 167      ( 10 Nov 2019 05:58 )             28.6     11-10    139  |  104  |  148.0<H>  ----------------------------<  87  3.6   |  15.0<L>  |  5.34<H>    Ca    8.8      10 Nov 2019 05:58                    MEDICATIONS:  ascorbic acid 500 milliGRAM(s) Oral daily  atorvastatin 40 milliGRAM(s) Oral at bedtime  calcitriol   Capsule 0.25 MICROGram(s) Oral daily  epoetin juan Injectable 94764 Unit(s) SubCutaneous <User Schedule>  ferrous    sulfate 325 milliGRAM(s) Oral daily  flecainide 100 milliGRAM(s) Oral two times a day  hydrALAZINE 100 milliGRAM(s) Oral three times a day  hydrALAZINE Injectable 10 milliGRAM(s) IV Push every 4 hours PRN  iron sucrose IVPB 200 milliGRAM(s) IV Intermittent every 24 hours  multivitamin 1 Tablet(s) Oral daily  NIFEdipine XL 90 milliGRAM(s) Oral daily  pantoprazole    Tablet 40 milliGRAM(s) Oral before breakfast  torsemide 20 milliGRAM(s) Oral daily

## 2019-11-10 NOTE — PROGRESS NOTE ADULT - SUBJECTIVE AND OBJECTIVE BOX
[facility] DIVISION OF KIDNEY DISEASES AND HYPERTENSION -- FOLLOW UP NOTE  --------------------------------------------------------------------------------  Chief Complaint:    24 hour events/subjective:        PAST HISTORY  --------------------------------------------------------------------------------  No significant changes to PMH, PSH, FHx, SHx, unless otherwise noted    ALLERGIES & MEDICATIONS  --------------------------------------------------------------------------------  Allergies    Plavix (Hives)  Toprol-XL (Rash)    Intolerances      Standing Inpatient Medications  ascorbic acid 500 milliGRAM(s) Oral daily  atorvastatin 40 milliGRAM(s) Oral at bedtime  calcitriol   Capsule 0.25 MICROGram(s) Oral daily  epoetin juan Injectable 44568 Unit(s) SubCutaneous <User Schedule>  flecainide 100 milliGRAM(s) Oral two times a day  hydrALAZINE 100 milliGRAM(s) Oral three times a day  iron sucrose IVPB 200 milliGRAM(s) IV Intermittent every 24 hours  multivitamin 1 Tablet(s) Oral daily  NIFEdipine XL 90 milliGRAM(s) Oral daily  pantoprazole    Tablet 40 milliGRAM(s) Oral before breakfast  polyethylene glycol/electrolyte Solution. 4000 milliLiter(s) Oral once  torsemide 20 milliGRAM(s) Oral daily    PRN Inpatient Medications  hydrALAZINE Injectable 10 milliGRAM(s) IV Push every 4 hours PRN      REVIEW OF SYSTEMS  --------------------------------------------------------------------------------  Gen: No weight changes, fatigue, fevers/chills, weakness  Skin: No rashes  Head/Eyes/Ears/Mouth: No headache; Normal hearing; Normal vision w/o blurriness; No sinus pain/discomfort, sore throat  Respiratory: No dyspnea, cough, wheezing, hemoptysis  CV: No chest pain, PND, orthopnea  GI: No abdominal pain, diarrhea, constipation, nausea, vomiting, melena, hematochezia  : No increased frequency, dysuria, hematuria, nocturia  MSK: No joint pain/swelling; no back pain; no edema  Neuro: No dizziness/lightheadedness, weakness, seizures, numbness, tingling  Heme: No easy bruising or bleeding  Endo: No heat/cold intolerance  Psych: No significant nervousness, anxiety, stress, depression    All other systems were reviewed and are negative, except as noted.    VITALS/PHYSICAL EXAM  --------------------------------------------------------------------------------  T(C): 36.4 (11-10-19 @ 10:20), Max: 36.8 (11-10-19 @ 00:11)  HR: 73 (11-10-19 @ 12:54) (61 - 79)  BP: 148/58 (11-10-19 @ 12:54) (117/41 - 175/69)  RR: 16 (11-10-19 @ 11:00) (16 - 19)  SpO2: 97% (11-10-19 @ 10:40) (95% - 100%)  Wt(kg): --    Weight (kg): 68.7 (11-10-19 @ 07:50)      11-09-19 @ 07:01  -  11-10-19 @ 07:00  --------------------------------------------------------  IN: 1157 mL / OUT: 0 mL / NET: 1157 mL      Physical Exam:  	Gen: NAD, well-appearing  	HEENT: PERRL, supple neck, clear oropharynx  	Pulm: CTA B/L  	CV: RRR, S1S2; no rub  	Back: No spinal or CVA tenderness; no sacral edema  	Abd: +BS, soft, nontender/nondistended  	: No suprapubic tenderness  	UE: Warm, FROM, no clubbing, intact strength; no edema; no asterixis  	LE: Warm, FROM, no clubbing, intact strength; no edema  	Neuro: No focal deficits, intact gait  	Psych: Normal affect and mood  	Skin: Warm, without rashes  	Vascular access:    LABS/STUDIES  --------------------------------------------------------------------------------              9.3    8.90  >-----------<  167      [11-10-19 @ 05:58]              28.6     139  |  104  |  148.0  ----------------------------<  87      [11-10-19 @ 05:58]  3.6   |  15.0  |  5.34        Ca     8.8     [11-10-19 @ 05:58]            Creatinine Trend:  SCr 5.34 [11-10 @ 05:58]  SCr 5.15 [11-09 @ 06:46]  SCr 4.66 [11-08 @ 04:50]  SCr 4.74 [11-07 @ 16:16]        HbA1c 4.9      [08-09-18 @ 05:54] [facility] DIVISION OF KIDNEY DISEASES AND HYPERTENSION -- FOLLOW UP NOTE  --------------------------------------------------------------------------------  Chief Complaint: CKD stage V    24 hour events/subjective:  Pt s/p 2 U PRBC, had EGD this AM  Pt seen and examined; States he feels well.        PAST HISTORY  --------------------------------------------------------------------------------  No significant changes to PMH, PSH, FHx, SHx, unless otherwise noted    ALLERGIES & MEDICATIONS  --------------------------------------------------------------------------------  Allergies    Plavix (Hives)  Toprol-XL (Rash)    Intolerances      Standing Inpatient Medications  ascorbic acid 500 milliGRAM(s) Oral daily  atorvastatin 40 milliGRAM(s) Oral at bedtime  calcitriol   Capsule 0.25 MICROGram(s) Oral daily  epoetin juan Injectable 21711 Unit(s) SubCutaneous <User Schedule>  flecainide 100 milliGRAM(s) Oral two times a day  hydrALAZINE 100 milliGRAM(s) Oral three times a day  iron sucrose IVPB 200 milliGRAM(s) IV Intermittent every 24 hours  multivitamin 1 Tablet(s) Oral daily  NIFEdipine XL 90 milliGRAM(s) Oral daily  pantoprazole    Tablet 40 milliGRAM(s) Oral before breakfast  polyethylene glycol/electrolyte Solution. 4000 milliLiter(s) Oral once  torsemide 20 milliGRAM(s) Oral daily    PRN Inpatient Medications  hydrALAZINE Injectable 10 milliGRAM(s) IV Push every 4 hours PRN      REVIEW OF SYSTEMS  --------------------------------------------------------------------------------  Gen: No weight changes, fatigue No fevers/chills, weakness  Skin: No rashes  Head/Eyes/Ears/Mouth: No headache; Normal hearing; Normal vision w/o blurriness; No sinus pain/discomfort, sore throat  Respiratory:  dyspnea+ No cough, wheezing, hemoptysis  CV: No chest pain, PND, orthopnea  GI: No abdominal pain, diarrhea, constipation, nausea, vomiting, black stools+  : No increased frequency, dysuria, hematuria, nocturia  MSK: No joint pain/swelling; no back pain; no edema  Neuro: No dizziness/lightheadedness, weakness, seizures  Heme: No easy bruising or bleeding  Endo: No heat/cold intolerance  Psych: No significant nervousness, anxiety, stress, depression      VITALS/PHYSICAL EXAM  --------------------------------------------------------------------------------  T(C): 36.4 (11-10-19 @ 10:20), Max: 36.8 (11-10-19 @ 00:11)  HR: 73 (11-10-19 @ 12:54) (61 - 79)  BP: 148/58 (11-10-19 @ 12:54) (117/41 - 175/69)  RR: 16 (11-10-19 @ 11:00) (16 - 19)  SpO2: 97% (11-10-19 @ 10:40) (95% - 100%)  Wt(kg): --    Weight (kg): 68.7 (11-10-19 @ 07:50)      11-09-19 @ 07:01  -  11-10-19 @ 07:00  --------------------------------------------------------  IN: 1157 mL / OUT: 0 mL / NET: 1157 mL      Physical Exam:  	Gen: NAD,  	HEENT: supple neck, on room air  	Pulm: CTA B/L  	CV: RRR, S1S2; no rub  	Back: unable to examine  	Abd: +BS, soft, nontender/nondistended  	: No suprapubic tenderness  	UE: Warm,  no edema; no asterixis  	LE: Warm, ; no edema  	Neuro: No focal deficits  	Psych: Normal affect and mood  	Skin: Warm, dry, pale  	Vascular access: LUE AVF, thrill+ bruit+  LABS/STUDIES  --------------------------------------------------------------------------------              9.3    8.90  >-----------<  167      [11-10-19 @ 05:58]              28.6     139  |  104  |  148.0  ----------------------------<  87      [11-10-19 @ 05:58]  3.6   |  15.0  |  5.34        Ca     8.8     [11-10-19 @ 05:58]            Creatinine Trend:  SCr 5.34 [11-10 @ 05:58]  SCr 5.15 [11-09 @ 06:46]  SCr 4.66 [11-08 @ 04:50]  SCr 4.74 [11-07 @ 16:16]        HbA1c 4.9      [08-09-18 @ 05:54]

## 2019-11-10 NOTE — PROGRESS NOTE ADULT - PROBLEM SELECTOR PLAN 2
continue IV iron and AMY  GI work up for likely blood loss continue IV iron and AMY  s/p EGD this AM; scheduled for colonoscopy tomorrow.

## 2019-11-11 ENCOUNTER — RESULT REVIEW (OUTPATIENT)
Age: 84
End: 2019-11-11

## 2019-11-11 LAB
ANION GAP SERPL CALC-SCNC: 22 MMOL/L — HIGH (ref 5–17)
BUN SERPL-MCNC: 123 MG/DL — HIGH (ref 8–20)
CALCIUM SERPL-MCNC: 8.7 MG/DL — SIGNIFICANT CHANGE UP (ref 8.6–10.2)
CHLORIDE SERPL-SCNC: 107 MMOL/L — SIGNIFICANT CHANGE UP (ref 98–107)
CO2 SERPL-SCNC: 16 MMOL/L — LOW (ref 22–29)
CREAT SERPL-MCNC: 5.45 MG/DL — HIGH (ref 0.5–1.3)
GLUCOSE SERPL-MCNC: 82 MG/DL — SIGNIFICANT CHANGE UP (ref 70–115)
HCT VFR BLD CALC: 27.2 % — LOW (ref 39–50)
HGB BLD-MCNC: 8.7 G/DL — LOW (ref 13–17)
MCHC RBC-ENTMCNC: 30.1 PG — SIGNIFICANT CHANGE UP (ref 27–34)
MCHC RBC-ENTMCNC: 32 GM/DL — SIGNIFICANT CHANGE UP (ref 32–36)
MCV RBC AUTO: 94.1 FL — SIGNIFICANT CHANGE UP (ref 80–100)
NRBC # BLD: 1 /100 WBCS — HIGH (ref 0–0)
PLATELET # BLD AUTO: 167 K/UL — SIGNIFICANT CHANGE UP (ref 150–400)
POTASSIUM SERPL-MCNC: 3.2 MMOL/L — LOW (ref 3.5–5.3)
POTASSIUM SERPL-SCNC: 3.2 MMOL/L — LOW (ref 3.5–5.3)
RBC # BLD: 2.89 M/UL — LOW (ref 4.2–5.8)
RBC # FLD: 17.6 % — HIGH (ref 10.3–14.5)
SODIUM SERPL-SCNC: 145 MMOL/L — SIGNIFICANT CHANGE UP (ref 135–145)
WBC # BLD: 7.67 K/UL — SIGNIFICANT CHANGE UP (ref 3.8–10.5)
WBC # FLD AUTO: 7.67 K/UL — SIGNIFICANT CHANGE UP (ref 3.8–10.5)

## 2019-11-11 PROCEDURE — 99233 SBSQ HOSP IP/OBS HIGH 50: CPT

## 2019-11-11 PROCEDURE — 45390 COLONOSCOPY W/RESECTION: CPT | Mod: 59

## 2019-11-11 PROCEDURE — 45385 COLONOSCOPY W/LESION REMOVAL: CPT | Mod: 59

## 2019-11-11 PROCEDURE — 45380 COLONOSCOPY AND BIOPSY: CPT | Mod: 59

## 2019-11-11 PROCEDURE — 45388 COLONOSCOPY W/ABLATION: CPT | Mod: 59

## 2019-11-11 RX ORDER — POTASSIUM CHLORIDE 20 MEQ
20 PACKET (EA) ORAL ONCE
Refills: 0 | Status: COMPLETED | OUTPATIENT
Start: 2019-11-11 | End: 2019-11-11

## 2019-11-11 RX ORDER — HYDRALAZINE HCL 50 MG
50 TABLET ORAL EVERY 6 HOURS
Refills: 0 | Status: DISCONTINUED | OUTPATIENT
Start: 2019-11-11 | End: 2019-11-13

## 2019-11-11 RX ORDER — POTASSIUM CHLORIDE 20 MEQ
10 PACKET (EA) ORAL ONCE
Refills: 0 | Status: COMPLETED | OUTPATIENT
Start: 2019-11-11 | End: 2019-11-11

## 2019-11-11 RX ADMIN — Medication 50 MILLIGRAM(S): at 15:53

## 2019-11-11 RX ADMIN — Medication 500 MILLIGRAM(S): at 12:38

## 2019-11-11 RX ADMIN — Medication 20 MILLIEQUIVALENT(S): at 18:04

## 2019-11-11 RX ADMIN — Medication 1 TABLET(S): at 12:38

## 2019-11-11 RX ADMIN — Medication 100 MILLIGRAM(S): at 05:51

## 2019-11-11 RX ADMIN — Medication 100 MILLIGRAM(S): at 12:37

## 2019-11-11 RX ADMIN — Medication 100 MILLIGRAM(S): at 22:26

## 2019-11-11 RX ADMIN — CALCITRIOL 0.25 MICROGRAM(S): 0.5 CAPSULE ORAL at 12:38

## 2019-11-11 RX ADMIN — Medication 50 MILLIGRAM(S): at 18:04

## 2019-11-11 RX ADMIN — Medication 100 MILLIEQUIVALENT(S): at 12:36

## 2019-11-11 RX ADMIN — ERYTHROPOIETIN 10000 UNIT(S): 10000 INJECTION, SOLUTION INTRAVENOUS; SUBCUTANEOUS at 22:26

## 2019-11-11 RX ADMIN — Medication 20 MILLIGRAM(S): at 05:51

## 2019-11-11 RX ADMIN — ATORVASTATIN CALCIUM 40 MILLIGRAM(S): 80 TABLET, FILM COATED ORAL at 22:26

## 2019-11-11 RX ADMIN — Medication 90 MILLIGRAM(S): at 05:51

## 2019-11-11 RX ADMIN — IRON SUCROSE 110 MILLIGRAM(S): 20 INJECTION, SOLUTION INTRAVENOUS at 17:23

## 2019-11-11 RX ADMIN — PANTOPRAZOLE SODIUM 40 MILLIGRAM(S): 20 TABLET, DELAYED RELEASE ORAL at 05:51

## 2019-11-11 RX ADMIN — Medication 10 MILLIGRAM(S): at 23:53

## 2019-11-11 NOTE — BRIEF OPERATIVE NOTE - OPERATION/FINDINGS
cecum polyp removed biopsy forceps  small cecal avm treated with APC   In the proximal ascending colon there were 3 polyps adjacent to each other - 1.2 cm polyp snared and clipped; 8 mm polyp snared; 5 mm polyp removed with biopsy forceps   In the mid ascending there was a 2.5 cm flat polyp; lifted with 5 ccs of Orise; snared in 2 pieces and 3 clips were placed  In the descending colon there was a medium sized avm which was treated with APC  left sided diverticulosis  3 cm ulcerated rectosigmoid polyp with a small amount of oozing, on a thick stalk, snared, and site was clipped  6 mm rectal polyp snared and clipped  medium sized hemorrhoids  no old blood cecum polyp removed biopsy forceps  small cecal avm treated with APC   In the proximal ascending colon there were 3 polyps adjacent to each other - 1.2 cm polyp snared and clipped; 8 mm polyp snared; 5 mm polyp removed with biopsy forceps   In the mid ascending there was a 2.5 cm flat polyp; lifted with 5 ccs of Orise; snared in 2 pieces and 3 clips were placed  In the transverse colon there was a medium sized avm which was treated with APC  left sided diverticulosis  3 cm ulcerated rectosigmoid polyp with a small amount of oozing, on a thick stalk, snared, and site was clipped  6 mm rectal polyp snared and clipped  medium sized hemorrhoids  no old blood

## 2019-11-11 NOTE — PROGRESS NOTE ADULT - SUBJECTIVE AND OBJECTIVE BOX
[facility] DIVISION OF KIDNEY DISEASES AND HYPERTENSION -- FOLLOW UP NOTE  --------------------------------------------------------------------------------  Chief Complaint: CKD stage 5    24 hour events/subjective:  Pt s/p colonoscopy this AM  Seen and examined at bedside, states he feels well.        PAST HISTORY  --------------------------------------------------------------------------------  No significant changes to PMH, PSH, FHx, SHx, unless otherwise noted    ALLERGIES & MEDICATIONS  --------------------------------------------------------------------------------  Allergies    Plavix (Hives)  Toprol-XL (Rash)    Intolerances      Standing Inpatient Medications  ascorbic acid 500 milliGRAM(s) Oral daily  atorvastatin 40 milliGRAM(s) Oral at bedtime  calcitriol   Capsule 0.25 MICROGram(s) Oral daily  epoetin juan Injectable 38097 Unit(s) SubCutaneous <User Schedule>  flecainide 100 milliGRAM(s) Oral two times a day  hydrALAZINE 50 milliGRAM(s) Oral every 6 hours  iron sucrose IVPB 200 milliGRAM(s) IV Intermittent every 24 hours  multivitamin 1 Tablet(s) Oral daily  NIFEdipine XL 90 milliGRAM(s) Oral daily  pantoprazole    Tablet 40 milliGRAM(s) Oral before breakfast  potassium chloride    Tablet ER 20 milliEquivalent(s) Oral once  torsemide 20 milliGRAM(s) Oral daily    PRN Inpatient Medications  hydrALAZINE Injectable 10 milliGRAM(s) IV Push every 4 hours PRN      REVIEW OF SYSTEMS  --------------------------------------------------------------------------------  Gen: No weight changes, fatigue No fevers/chills, weakness  Skin: No rashes  Head/Eyes/Ears/Mouth: No headache; Normal hearing; Normal vision w/o blurriness; No sinus pain/discomfort, sore throat  Respiratory:  dyspnea+ No cough, wheezing, hemoptysis  CV: No chest pain, PND, orthopnea  GI: No abdominal pain, diarrhea, constipation, nausea, vomiting, black stools+  : No increased frequency, dysuria, hematuria, nocturia  MSK: No joint pain/swelling; no back pain; no edema  Neuro: No dizziness/lightheadedness, weakness, seizures  Heme: No easy bruising or bleeding  Endo: No heat/cold intolerance  Psych: No significant nervousness, anxiety, stress, depression    VITALS/PHYSICAL EXAM  --------------------------------------------------------------------------------  T(C): 36.3 (11-11-19 @ 12:56), Max: 36.9 (11-11-19 @ 05:44)  HR: 64 (11-11-19 @ 12:56) (61 - 76)  BP: 111/46 (11-11-19 @ 12:56) (111/46 - 175/67)  RR: 16 (11-11-19 @ 12:56) (16 - 18)  SpO2: 98% (11-11-19 @ 12:56) (94% - 98%)  Wt(kg): --    Weight (kg): 68.7 (11-10-19 @ 07:50)      11-10-19 @ 07:01  -  11-11-19 @ 07:00  --------------------------------------------------------  IN: 3110 mL / OUT: 300 mL / NET: 2810 mL    11-11-19 @ 07:01  -  11-11-19 @ 14:02  --------------------------------------------------------  IN: 360 mL / OUT: 0 mL / NET: 360 mL      Physical Exam:  	Gen: NAD,  	HEENT: supple neck, on room air  	Pulm: CTA B/L  	CV: RRR, S1S2; no rub  	Back: unable to examine  	Abd: +BS, soft, nontender/nondistended  	: No suprapubic tenderness  	UE: Warm,  no edema; no asterixis  	LE: Warm, ; no edema  	Neuro: No focal deficits  	Psych: Normal affect and mood  	Skin: Warm, dry, pale  	Vascular access: LUE AVF, thrill+ bruit+    LABS/STUDIES  --------------------------------------------------------------------------------              8.7    7.67  >-----------<  167      [11-11-19 @ 06:31]              27.2     145  |  107  |  123.0  ----------------------------<  82      [11-11-19 @ 06:31]  3.2   |  16.0  |  5.45        Ca     8.7     [11-11-19 @ 06:31]            Creatinine Trend:  SCr 5.45 [11-11 @ 06:31]  SCr 5.34 [11-10 @ 05:58]  SCr 5.15 [11-09 @ 06:46]  SCr 4.66 [11-08 @ 04:50]  SCr 4.74 [11-07 @ 16:16]        HbA1c 4.9      [08-09-18 @ 05:54]

## 2019-11-11 NOTE — PROGRESS NOTE ADULT - PROBLEM SELECTOR PLAN 1
follows with Dr. Avendano; has a LUE AVF created last year  BUN/Cr worse than baseline; however, no indication for HD at this time.

## 2019-11-11 NOTE — PROGRESS NOTE ADULT - SUBJECTIVE AND OBJECTIVE BOX
Patient: CHRISTIANO ELIZABETH [id number] 85y Male                           Internal Medicine Hospitalist Progress Note    Initial HPI:  86 y/o male with PMH of CKD-5 not on HD, Anemia of chronic disease on Aranesp, CHFpEF, arrythmia, DM-2 diet controlled, HTN was sent to the ED from [facility] for low Hb.  He has had increasing SOB and RICHARDS over past several weeks.  Noted Hgb 6.7 on admission with stool OB positive.  Wife reported he has had foul smelling dark stool, belching same odor, over past several weeks.  S/p total of 4 units of pRBCs.  EGD showed mild gastritis.  Colonoscopy showed multiple polyps, AVM, hemorrhoids.  No active bleeding.       Interval History:  No complaints.  Feels well.  No dizziness / lightheadedness.  no chest pain / palpitations / SOB    ____________________PHYSICAL EXAM:  Vitals reviewed as indicated below  GENERAL:  NAD Alert and Oriented x 3   HEENT: NCAT  CARDIOVASCULAR:  S1, S2  LUNGS: CTAB  ABDOMEN:  soft, (-) tenderness, (-) distension, (+) bowel sounds, (-) guarding, (-) rebound (-) rigidity  EXTREMITIES:  no cyanosis / clubbing.  1+ edema.   ____________________    VITALS:  Vital Signs Last 24 Hrs  T(C): 36.3 (2019 12:56), Max: 36.9 (2019 05:44)  T(F): 97.4 (2019 12:56), Max: 98.5 (2019 05:44)  HR: 64 (2019 12:56) (61 - 76)  BP: 111/46 (2019 12:56) (111/46 - 175/67)  BP(mean): --  RR: 16 (2019 12:56) (16 - 18)  SpO2: 98% (2019 12:56) (94% - 98%) Daily     Daily Weight in k.9 (2019 05:42)  CAPILLARY BLOOD GLUCOSE        I&O's Summary    10 Nov 2019 07:01  -  2019 07:00  --------------------------------------------------------  IN: 3110 mL / OUT: 300 mL / NET: 2810 mL    2019 07:01  -  2019 14:45  --------------------------------------------------------  IN: 360 mL / OUT: 0 mL / NET: 360 mL        LABS:                        8.7    7.67  )-----------( 167      ( 2019 06:31 )             27.2         145  |  107  |  123.0<H>  ----------------------------<  82  3.2<L>   |  16.0<L>  |  5.45<H>    Ca    8.7      2019 06:31                    MEDICATIONS:  ascorbic acid 500 milliGRAM(s) Oral daily  atorvastatin 40 milliGRAM(s) Oral at bedtime  calcitriol   Capsule 0.25 MICROGram(s) Oral daily  epoetin juan Injectable 21845 Unit(s) SubCutaneous <User Schedule>  flecainide 100 milliGRAM(s) Oral two times a day  hydrALAZINE 50 milliGRAM(s) Oral every 6 hours  hydrALAZINE Injectable 10 milliGRAM(s) IV Push every 4 hours PRN  iron sucrose IVPB 200 milliGRAM(s) IV Intermittent every 24 hours  multivitamin 1 Tablet(s) Oral daily  NIFEdipine XL 90 milliGRAM(s) Oral daily  pantoprazole    Tablet 40 milliGRAM(s) Oral before breakfast  potassium chloride    Tablet ER 20 milliEquivalent(s) Oral once  torsemide 20 milliGRAM(s) Oral daily

## 2019-11-11 NOTE — BRIEF OPERATIVE NOTE - NSICDXBRIEFPOSTOP_GEN_ALL_CORE_FT
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
POST-OP DIAGNOSIS:  Erosive gastritis 10-Nov-2019 10:24:34  Evangelist Giron

## 2019-11-11 NOTE — BRIEF OPERATIVE NOTE - NSICDXBRIEFPREOP_GEN_ALL_CORE_FT
PRE-OP DIAGNOSIS:  GI bleeding 10-Nov-2019 10:24:25  Evangelist Giron
PRE-OP DIAGNOSIS:  GI bleeding 10-Nov-2019 10:24:25  Evangelist Giron

## 2019-11-12 ENCOUNTER — TRANSCRIPTION ENCOUNTER (OUTPATIENT)
Age: 84
End: 2019-11-12

## 2019-11-12 LAB
ALBUMIN SERPL ELPH-MCNC: 3.6 G/DL — SIGNIFICANT CHANGE UP (ref 3.3–5.2)
ALP SERPL-CCNC: 84 U/L — SIGNIFICANT CHANGE UP (ref 40–120)
ALT FLD-CCNC: 30 U/L — SIGNIFICANT CHANGE UP
AMYLASE P1 CFR SERPL: 107 U/L — SIGNIFICANT CHANGE UP (ref 36–128)
ANION GAP SERPL CALC-SCNC: 17 MMOL/L — SIGNIFICANT CHANGE UP (ref 5–17)
AST SERPL-CCNC: 21 U/L — SIGNIFICANT CHANGE UP
BILIRUB SERPL-MCNC: 0.3 MG/DL — LOW (ref 0.4–2)
BUN SERPL-MCNC: 100 MG/DL — HIGH (ref 8–20)
CALCIUM SERPL-MCNC: 8.7 MG/DL — SIGNIFICANT CHANGE UP (ref 8.6–10.2)
CHLORIDE SERPL-SCNC: 106 MMOL/L — SIGNIFICANT CHANGE UP (ref 98–107)
CO2 SERPL-SCNC: 16 MMOL/L — LOW (ref 22–29)
CREAT SERPL-MCNC: 5.36 MG/DL — HIGH (ref 0.5–1.3)
GLUCOSE SERPL-MCNC: 210 MG/DL — HIGH (ref 70–115)
HCT VFR BLD CALC: 30 % — LOW (ref 39–50)
HGB BLD-MCNC: 9.4 G/DL — LOW (ref 13–17)
LIDOCAIN IGE QN: 88 U/L — HIGH (ref 22–51)
MCHC RBC-ENTMCNC: 30.6 PG — SIGNIFICANT CHANGE UP (ref 27–34)
MCHC RBC-ENTMCNC: 31.3 GM/DL — LOW (ref 32–36)
MCV RBC AUTO: 97.7 FL — SIGNIFICANT CHANGE UP (ref 80–100)
PLATELET # BLD AUTO: 176 K/UL — SIGNIFICANT CHANGE UP (ref 150–400)
POTASSIUM SERPL-MCNC: 4.2 MMOL/L — SIGNIFICANT CHANGE UP (ref 3.5–5.3)
POTASSIUM SERPL-SCNC: 4.2 MMOL/L — SIGNIFICANT CHANGE UP (ref 3.5–5.3)
PROT SERPL-MCNC: 5.7 G/DL — LOW (ref 6.6–8.7)
RBC # BLD: 3.07 M/UL — LOW (ref 4.2–5.8)
RBC # FLD: 18.6 % — HIGH (ref 10.3–14.5)
SODIUM SERPL-SCNC: 139 MMOL/L — SIGNIFICANT CHANGE UP (ref 135–145)
SURGICAL PATHOLOGY STUDY: SIGNIFICANT CHANGE UP
WBC # BLD: 11.69 K/UL — HIGH (ref 3.8–10.5)
WBC # FLD AUTO: 11.69 K/UL — HIGH (ref 3.8–10.5)

## 2019-11-12 PROCEDURE — 71045 X-RAY EXAM CHEST 1 VIEW: CPT | Mod: 26

## 2019-11-12 PROCEDURE — 99233 SBSQ HOSP IP/OBS HIGH 50: CPT

## 2019-11-12 PROCEDURE — 99232 SBSQ HOSP IP/OBS MODERATE 35: CPT

## 2019-11-12 RX ORDER — PANTOPRAZOLE SODIUM 20 MG/1
1 TABLET, DELAYED RELEASE ORAL
Qty: 30 | Refills: 0
Start: 2019-11-12

## 2019-11-12 RX ORDER — NIFEDIPINE 30 MG
1 TABLET, EXTENDED RELEASE 24 HR ORAL
Qty: 0 | Refills: 0 | DISCHARGE
Start: 2019-11-12

## 2019-11-12 RX ORDER — ONDANSETRON 8 MG/1
4 TABLET, FILM COATED ORAL ONCE
Refills: 0 | Status: COMPLETED | OUTPATIENT
Start: 2019-11-12 | End: 2019-11-12

## 2019-11-12 RX ORDER — NIFEDIPINE 30 MG
1 TABLET, EXTENDED RELEASE 24 HR ORAL
Qty: 0 | Refills: 0 | DISCHARGE

## 2019-11-12 RX ADMIN — IRON SUCROSE 110 MILLIGRAM(S): 20 INJECTION, SOLUTION INTRAVENOUS at 13:24

## 2019-11-12 RX ADMIN — ONDANSETRON 4 MILLIGRAM(S): 8 TABLET, FILM COATED ORAL at 01:43

## 2019-11-12 RX ADMIN — Medication 50 MILLIGRAM(S): at 17:58

## 2019-11-12 RX ADMIN — Medication 20 MILLIGRAM(S): at 11:42

## 2019-11-12 RX ADMIN — Medication 50 MILLIGRAM(S): at 06:24

## 2019-11-12 RX ADMIN — Medication 50 MILLIGRAM(S): at 23:00

## 2019-11-12 RX ADMIN — Medication 90 MILLIGRAM(S): at 06:24

## 2019-11-12 RX ADMIN — ONDANSETRON 4 MILLIGRAM(S): 8 TABLET, FILM COATED ORAL at 03:56

## 2019-11-12 RX ADMIN — Medication 100 MILLIGRAM(S): at 11:42

## 2019-11-12 RX ADMIN — ATORVASTATIN CALCIUM 40 MILLIGRAM(S): 80 TABLET, FILM COATED ORAL at 23:00

## 2019-11-12 RX ADMIN — PANTOPRAZOLE SODIUM 40 MILLIGRAM(S): 20 TABLET, DELAYED RELEASE ORAL at 11:41

## 2019-11-12 RX ADMIN — Medication 100 MILLIGRAM(S): at 23:00

## 2019-11-12 RX ADMIN — CALCITRIOL 0.25 MICROGRAM(S): 0.5 CAPSULE ORAL at 17:59

## 2019-11-12 RX ADMIN — Medication 50 MILLIGRAM(S): at 11:42

## 2019-11-12 NOTE — PROGRESS NOTE ADULT - SUBJECTIVE AND OBJECTIVE BOX
INTERVAL HPI/OVERNIGHT EVENTS:FU after colonoscopy which revealed colon AVMs and colon polyps. Patient had episode of vomiting and feels that he took too many pills. Now drinking tea this morning. No abdominal pain. No bowel movement.     MEDICATIONS  (STANDING):  ascorbic acid 500 milliGRAM(s) Oral daily  atorvastatin 40 milliGRAM(s) Oral at bedtime  calcitriol   Capsule 0.25 MICROGram(s) Oral daily  epoetin juan Injectable 47463 Unit(s) SubCutaneous <User Schedule>  flecainide 100 milliGRAM(s) Oral two times a day  hydrALAZINE 50 milliGRAM(s) Oral every 6 hours  iron sucrose IVPB 200 milliGRAM(s) IV Intermittent every 24 hours  multivitamin 1 Tablet(s) Oral daily  NIFEdipine XL 90 milliGRAM(s) Oral daily  pantoprazole    Tablet 40 milliGRAM(s) Oral before breakfast  torsemide 20 milliGRAM(s) Oral daily    MEDICATIONS  (PRN):  hydrALAZINE Injectable 10 milliGRAM(s) IV Push every 4 hours PRN sbp>170      Allergies    Plavix (Hives)  Toprol-XL (Rash)    Intolerances        Vital Signs Last 24 Hrs  T(C): 36.4 (12 Nov 2019 05:59), Max: 36.8 (11 Nov 2019 15:10)  T(F): 97.6 (12 Nov 2019 05:59), Max: 98.2 (11 Nov 2019 15:10)  HR: 70 (12 Nov 2019 07:51) (64 - 70)  BP: 144/56 (12 Nov 2019 07:51) (111/46 - 181/64)  BP(mean): --  RR: 18 (12 Nov 2019 05:59) (16 - 18)  SpO2: 97% (12 Nov 2019 05:59) (97% - 98%)    LABS:                        9.4    11.69 )-----------( 176      ( 12 Nov 2019 05:52 )             30.0     11-12    139  |  106  |  100.0<H>  ----------------------------<  210<H>  4.2   |  16.0<L>  |  5.36<H>    Ca    8.7      12 Nov 2019 05:52    TPro  5.7<L>  /  Alb  3.6  /  TBili  0.3<L>  /  DBili  x   /  AST  21  /  ALT  30  /  AlkPhos  84  11-12          RADIOLOGY & ADDITIONAL TESTS:

## 2019-11-12 NOTE — PROGRESS NOTE ADULT - SUBJECTIVE AND OBJECTIVE BOX
Patient: CHRISTIANO ELIZABETH [id number] 85y Male                           Internal Medicine Hospitalist Progress Note    Initial HPI:  84 y/o male with PMH of CKD-5 not on HD, Anemia of chronic disease on Aranesp, CHFpEF, arrythmia, DM-2 diet controlled, HTN was sent to the ED from [facility] for low Hb.  He has had increasing SOB and RICHARDS over past several weeks.  Noted Hgb 6.7 on admission with stool OB positive.  Wife reported he has had foul smelling dark stool, belching same odor, over past several weeks.  S/p total of 4 units of pRBCs.  EGD showed mild gastritis.  Colonoscopy showed multiple polyps, AVM, hemorrhoids.  No active bleeding.       Interval History:  Some abdominal cramping overnight, resolved.  Tolerating po intake.  No additional complaints.    ____________________PHYSICAL EXAM:  Vitals reviewed as indicated below  GENERAL:  NAD Alert and Oriented x 3   HEENT: NCAT  CARDIOVASCULAR:  S1, S2  LUNGS: slightly decreased basilar BS.   ABDOMEN:  soft, (-) tenderness, (-) distension, (+) bowel sounds, (-) guarding, (-) rebound (-) rigidity  EXTREMITIES:  no cyanosis / clubbing.  1+ edema.   ____________________    VITALS:  Vital Signs Last 24 Hrs  T(C): 36.3 (2019 10:14), Max: 36.7 (2019 22:22)  T(F): 97.4 (2019 10:14), Max: 98.1 (2019 22:22)  HR: 67 (2019 11:40) (66 - 70)  BP: 147/58 (2019 11:40) (144/56 - 181/64)  BP(mean): --  RR: 18 (2019 10:14) (18 - 18)  SpO2: 94% (2019 10:14) (94% - 98%) Daily     Daily Weight in k.4 (2019 10:47)  CAPILLARY BLOOD GLUCOSE        I&O's Summary    2019 07:01  -  2019 07:00  --------------------------------------------------------  IN: 360 mL / OUT: 200 mL / NET: 160 mL    2019 07:01  -  2019 15:55  --------------------------------------------------------  IN: 480 mL / OUT: 0 mL / NET: 480 mL        LABS:                        9.4    11.69 )-----------( 176      ( 2019 05:52 )             30.0     11-12    139  |  106  |  100.0<H>  ----------------------------<  210<H>  4.2   |  16.0<L>  |  5.36<H>    Ca    8.7      2019 05:52    TPro  5.7<L>  /  Alb  3.6  /  TBili  0.3<L>  /  DBili  x   /  AST  21  /  ALT  30  /  AlkPhos  84  11-12      LIVER FUNCTIONS - ( 2019 05:52 )  Alb: 3.6 g/dL / Pro: 5.7 g/dL / ALK PHOS: 84 U/L / ALT: 30 U/L / AST: 21 U/L / GGT: x                     MEDICATIONS:  ascorbic acid 500 milliGRAM(s) Oral daily  atorvastatin 40 milliGRAM(s) Oral at bedtime  calcitriol   Capsule 0.25 MICROGram(s) Oral daily  epoetin juan Injectable 76302 Unit(s) SubCutaneous <User Schedule>  flecainide 100 milliGRAM(s) Oral two times a day  hydrALAZINE 50 milliGRAM(s) Oral every 6 hours  hydrALAZINE Injectable 10 milliGRAM(s) IV Push every 4 hours PRN  multivitamin 1 Tablet(s) Oral daily  NIFEdipine XL 90 milliGRAM(s) Oral daily  pantoprazole    Tablet 40 milliGRAM(s) Oral before breakfast  torsemide 20 milliGRAM(s) Oral daily

## 2019-11-12 NOTE — DIETITIAN INITIAL EVALUATION ADULT. - PERTINENT LABORATORY DATA
11-12 Na139 mmol/L Glu 210 mg/dL<H> K+ 4.2 mmol/L Cr  5.36 mg/dL<H> .0 mg/dL<H> Phos n/a   Alb 3.6 g/dL PAB n/a

## 2019-11-12 NOTE — PROGRESS NOTE ADULT - SUBJECTIVE AND OBJECTIVE BOX
Chief Complaint: chart and hx reviewed;    HPI: 84 yo male with multiple comorbidities adm with worsening anemia. Hx of moderate CAD/cri/right CEA/moderate AS/hpt and hx of VT. Had colonoscopy with polypectomy and was transfused. Felt "better" yesterday-has some mild abd discomfort.    PAST MEDICAL & SURGICAL HISTORY:  Risk factors for obstructive sleep apnea  Anemia  RICHARDS (dyspnea on exertion)  VT (ventricular tachycardia)  HTN (hypertension)  CAD (coronary artery disease)  CKD (chronic kidney disease): stage IV  Arrhythmia  AV block, 1st degree  DM (diabetes mellitus)  H/O carotid endarterectomy: Right  A-V fistula: left arm 5/2017  H/O angioplasty: 2013,  no  intervention  H/O left knee surgery  H/O circumcision: at  age  65      PREVIOUS DIAGNOSTIC TESTING:      ECHO  FINDINGS: hyperdynamic LV.    STRESS  FINDINGS:    CATHETERIZATION  FINDINGS: moderate CAD with 70% RCA lesion but neg PET for ischemia.    MEDICATIONS  (STANDING):  ascorbic acid 500 milliGRAM(s) Oral daily  atorvastatin 40 milliGRAM(s) Oral at bedtime  calcitriol   Capsule 0.25 MICROGram(s) Oral daily  epoetin juan Injectable 00719 Unit(s) SubCutaneous <User Schedule>  flecainide 100 milliGRAM(s) Oral two times a day  hydrALAZINE 50 milliGRAM(s) Oral every 6 hours  iron sucrose IVPB 200 milliGRAM(s) IV Intermittent every 24 hours  multivitamin 1 Tablet(s) Oral daily  NIFEdipine XL 90 milliGRAM(s) Oral daily  pantoprazole    Tablet 40 milliGRAM(s) Oral before breakfast  torsemide 20 milliGRAM(s) Oral daily    MEDICATIONS  (PRN):  hydrALAZINE Injectable 10 milliGRAM(s) IV Push every 4 hours PRN sbp>170      FAMILY HISTORY:  Family history of premature CAD  Family history of lung cancer  Family history of cancer      ROS: Negative other than as mentioned in HPI.    Vital Signs Last 24 Hrs  T(C): 36.4 (12 Nov 2019 05:59), Max: 36.8 (11 Nov 2019 15:10)  T(F): 97.6 (12 Nov 2019 05:59), Max: 98.2 (11 Nov 2019 15:10)  HR: 70 reg12 Nov 2019 07:51) (64 - 70)  BP: 120/80 RA manually (12 Nov 2019 07:51) (111/46 - 181/64)  BP(mean): --  RR: 18 (12 Nov 2019 05:59) (16 - 18)  SpO2: 97% (12 Nov 2019 05:59) (97% - 98%)    PHYSICAL EXAM:  General: Appears well developed, well nourished alert and cooperative. elderly afebrile alert male nad.  HEENT: Head; normocephalic, atraumatic.  Eyes;   Pupils reactive, cornea wnl.  Neck; Supple, no nodes adenopathy, no NVD . Rt CEA scar and referred bruits. No thyromegaly.  CARDIOVASCULAR; 3/6 basal systolic  murmur radiating to neck. S2+ rub, gallop or lift.   LUNGS; No rales, rhonchi or wheeze. Normal breath sounds bilaterally.  ABDOMEN ; Soft, nontender without mass or organomegaly. bowel sounds +  EXTREMITIES; No clubbing, cyanosis or edema. Distal pulses ? ROM normal.  SKIN; warm and dry with normal turgor.  NEURO; Alert/oriented x 3/normal motor exam.     PSYCH; normal affect.            INTERPRETATION OF TELEMETRY:    ECG: ekg reviewed.  first degree avb.    I&O's Detail    11 Nov 2019 07:01  -  12 Nov 2019 07:00  --------------------------------------------------------  IN:    Oral Fluid: 360 mL  Total IN: 360 mL    OUT:    Voided: 200 mL  Total OUT: 200 mL    Total NET: 160 mL          LABS:                        9.4    11.69 )-----------( 176      ( 12 Nov 2019 05:52 )             30.0     11-12    139  |  106  |  100.0<H>  ----------------------------<  210<H>  4.2   |  16.0<L>  |  5.36<H>    Ca    8.7      12 Nov 2019 05:52    TPro  5.7<L>  /  Alb  3.6  /  TBili  0.3<L>  /  DBili  x   /  AST  21  /  ALT  30  /  AlkPhos  84  11-12            I&O's Summary    11 Nov 2019 07:01 - 12 Nov 2019 07:00  --------------------------------------------------------  IN: 360 mL / OUT: 200 mL / NET: 160 mL        RADIOLOGY & ADDITIONAL STUDIES:

## 2019-11-12 NOTE — DISCHARGE NOTE PROVIDER - NSDCFUSCHEDAPPT_GEN_ALL_CORE_FT
PROETTA, CHRISTIANO ; 11/21/2019 ; [facility]  PROETTA, CHRISTIANO ; 12/05/2019 ; [facility]  PROETTA, CHRISTIANO ; 12/13/2019 ; NPP Nephro [address]  PROCRYSTAL, CHRISTIANO ; 12/17/2019 ; NPP Vascular [address]  PROCRYSTAL, CHRISTIANO ; 12/19/2019 ; [facility]  PROETTA, CHRISTIANO ; 01/02/2020 ; [facility]  PROETTA, CHRISTIANO ; 01/02/2020 ; [facility]

## 2019-11-12 NOTE — CONSULT NOTE ADULT - SUBJECTIVE AND OBJECTIVE BOX
86 y/o male with PMH of ESRD not on HD, anemia, CHFpEF, arrythmia, DM-2 diet controlled, HTN was sent to the ED from [facility] for low Hb.     As per daughter at bed side, patient has been noticed to have shortness of breath with minimal exertion in the past 2-3 days. Patient has no chest pain, palpitation, hematuria, hematochezia, melena, abdominal pain, change in bowel/urinary habit, dizziness.       Allergies:  Plavix: Drug, Hives  Toprol-XL: Drug, Rash      Home Medications:   · 	atorvastatin 40 mg oral tablet: Last Dose Taken:  , 1 tab(s) orally once a day (at bedtime)  · 	torsemide 20 mg oral tablet: Last Dose Taken:  , 1 tab(s) orally once a day  · 	calcitriol 0.25 mcg oral capsule: Last Dose Taken:  , 1 cap(s) orally once a day  · 	Vitamin C 250 mg oral tablet: Last Dose Taken:  , 1 tab(s) orally once a day  · 	flecainide 100 mg oral tablet: Last Dose Taken:  , 1 tab(s) orally every 12 hours  · 	Nephro-Thomas oral tablet: Last Dose Taken:  , 1 tab(s) orally once a day  · 	ferrous sulfate 325 mg (65 mg elemental iron) oral delayed release tablet: Last Dose Taken:  , 1 tab(s) orally once a day  · 	NIFEdipine 90 mg oral tablet, extended release: Last Dose Taken:  , 1 tab(s) orally once a day  · 	NIFEdipine 60 mg oral tablet, extended release: Last Dose Taken:  , 1 tab(s) orally once a day  · 	Aspirin Enteric Coated 81 mg oral delayed release tablet: Last Dose Taken:  , 2 tab(s) orally once a day  · 	hydrALAZINE 100 mg oral tablet: Last Dose Taken:  , orally 3 times a day  · 	metOLazone 5 mg oral tablet: Last Dose Taken:  , 1 tab(s) orally once a day, As Needed        PAST MEDICAL HISTORY:  Anemia   Arrhythmia   AV block, 1st degree   CAD   DM   HTN  VT     PAST SURGICAL HISTORY:  A-V fistula left arm 5/2017  H/O angioplasty 2013,  no  intervention  H/O carotid endarterectomy Right  H/O circumcision at  age  65  H/O left knee surgery.     FAMILY HISTORY:  Family history of cancer  Family history of lung cancer  Family history of premature CAD.        Xray Chest 1 View- PORTABLE-Urgent: EXAM:  XR CHEST PORTABLE URGENT 1V                          PROCEDURE DATE:  11/07/2019      INTERPRETATION:  CLINICAL INFORMATION:  Shortness of breath    TECHNIQUE:  AP view of the chest.    COMPARISON:  8/9/2018    FINDINGS:  There is blunting of the lateral costophrenic angles   reflecting small bilateral pleural effusions. There is mild bibasilar   atelectasis. Cardiac silhouette size is exaggerated by AP technique.   Superior mediastinal contours are within normal limits. There are   degenerative changes of the spine.    IMPRESSION: Small bilateral pleural effusions.     	      ·  PRE-OP DIAGNOSIS:  GI bleeding 10-Nov-2019 10:24:25  Evangelist Giron.  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  Operative Findings:  · Operative Findings 	cecum polyp removed biopsy forceps small cecal avm treated with APC  In the proximal ascending colon there were 3 polyps adjacent to each other - 1.2 cm polyp snared and clipped; 8 mm polyp snared; 5 mm polyp removed with biopsy forceps  In the mid ascending there was a 2.5 cm flat polyp; lifted with 5 ccs of Orise; snared in 2 pieces and 3 clips were placed in the transverse colon there was a medium sized avm which was treated with APC left sided diverticulosis 3 cm ulcerated rectosigmoid polyp with a small amount of oozing, on a thick stalk, snared, and site was clipped 6 mm rectal polyp snared and clipped medium sized hemorrhoids no old blood	    REVIEW OF SYSTEMS  --------------------------------------------------------------------------------  Gen: No weight changes, fatigue No fevers/chills, weakness  Skin: No rashes  Head/Eyes/Ears/Mouth: No headache; Normal hearing; Normal vision w/o blurriness; No sinus pain/discomfort, sore throat  Respiratory:  No cough, wheezing, hemoptysis, dyspnea  CV: No chest pain, PND, orthopnea  GI: +Nausea, +Emesis (clear), Melena now resolved. No abdominal pain, diarrhea, constipation  : No increased frequency, dysuria, hematuria, nocturia  MSK: No joint pain/swelling; no back pain; no edema  Neuro: No dizziness/lightheadedness, weakness, seizures  Heme: No easy bruising or bleeding  Endo: No heat/cold intolerance      Physical Exam:  Gen: NAD,  HEENT: supple neck, on room air  Pulm: Diminished breath sounds in LLL on auscultation, no wheezes, rhonchi, rales  CV: Grade III/VI murmur, RRR, S1S2; no rub  Abd: +BS, soft, nontender/nondistended  : No suprapubic tenderness  UE: Warm,  no edema; no asterixis  LE: Warm, Trace edema b/l LE  Neuro: No focal deficits  Psych: Normal affect and mood  Skin: Warm, dry, pale  Vascular access: LUE AVF, thrill+ bruit+    LABS/STUDIES  --------------------------------------------------------------------------------              8.7    7.67  >-----------<  167      [11-11-19 @ 06:31]              27.2     145  |  107  |  123.0  ----------------------------<  82      [11-11-19 @ 06:31]  3.2   |  16.0  |  5.45        Ca     8.7     [11-11-19 @ 06:31]      Creatinine Trend:  SCr 5.45 [11-11 @ 06:31]  SCr 5.34 [11-10 @ 05:58]  SCr 5.15 [11-09 @ 06:46]  SCr 4.66 [11-08 @ 04:50]  SCr 4.74 [11-07 @ 16:16]    HbA1c 4.9      [08-09-18 @ 05:54]

## 2019-11-12 NOTE — DIETITIAN INITIAL EVALUATION ADULT. - MALNUTRITION
Limited NFPE performed- Mild muscle wasting at temples, clavicle, shoulder.  Mild fat loss in orbital region. moderate (acute)

## 2019-11-12 NOTE — DIETITIAN INITIAL EVALUATION ADULT. - ETIOLOGY
related to inability to consume sufficient protein energy intake with decreased appetite in setting of recent SOB and symptomatic anemia

## 2019-11-12 NOTE — CHART NOTE - NSCHARTNOTEFT_GEN_A_CORE
patient with nausea overnight, minimal improvement with 2 doses of reglan. Had one episode of nonbloody, nonbilious vomit. States that he might have eaten too fast because he was hungry after being npo yesterday for procedure. No abdominal pain or diarrhea. Belly soft and nontender to palpation. HTN this morning, was able to receive hydralazine and nifedipine but regurgitated some of the water. If remains hypertensive in an hour utilize prn IV hydralazine already ordered. awaiting labs to f/u with n/v if LFTs elevated may need abd CT or ultrasound if symptoms don't improve.

## 2019-11-12 NOTE — DIETITIAN INITIAL EVALUATION ADULT. - OTHER INFO
Pt with h/o CKD-5 not on HD, Anemia of chronic disease on Aranesp, CHFpEF, arrythmia, DM-2 diet controlled, HTN was sent to the ED from [facility] for low Hb.  He has had increasing SOB and RICHARDS over past several weeks.  EGD showed mild gastritis. Colonoscopy showed multiple polyps, AVM, hemorrhoids.  No active bleeding.  Spoke with pts son at law at bedside as pt is currently sleeping.  Pt with decreased po intake since admission- diet fluctuating between clears/fulls/regular.  Pt currently on fulls due to N/V last night and did not consume breakfast this morning.  If pt feeling better later today, diet to be advanced per GI note.  Pt normally with good po intake prior to admission per son in law and believes that pt maintains wt at home.

## 2019-11-12 NOTE — CONSULT NOTE ADULT - ASSESSMENT
Patient received 4 units of PRBC, total    s/p colonoscopy and clippings    Anemia due to stage 5 chronic kidney disease, not on chronic dialysis.     -Continue IV iron and AMY  -Monitor hgb, transfuse to keep hgb >7  -trend BMP, Creatanine  -Sodium bicarbonate  -Continue hydralazine, nifedipine, torsemide for BP control  -Continue calcitriol  -Continue DASH/TLC diet as tolerated  -Consider repeat chest x-ray (small bilateral pleural effusions)  -No immediate need for dialysis  -Plan discussed with patient's daughter
Pt with CKD stage V, admitted for symptomatic anemia.    1) CKD stage V- not on dialysis  2) Anemia of chronic disease  3) HTN  4) DM type II, diet controlled    s/p 2 U PRBC  continue IV iron and AMY  GI work up for ? blood loss anemia  No indication for dialysis  continue antihypertensive regimen  will follow.
Mr King is a 84 y/o man with HTN, HLD, moderate CAD, hx of VT (no ICD on Flecainide), chronic HFpEF, CKD IV s/p fistula placement, Anemia, DM, carotid stenosis s/p CEA, chronic anemia on Aransep s/p prior transfusions of pRBC, last admitted 2/12019 with worsening renal failure and hypervolemia who was referred for admission by his hematologist for concerns related to progressive fatigue and dyspnea with minimal exertion and acute on chronic anemia    Shortness of breath  - multifactorial including acute on chronic anemia, renal failure and diastolic dysfunction   - troponin elevation noted and likely related to demand in the setting of acute on chronic anemia, renal failure and diastolic dysfunction, agree with trending CE. At this time given acute on chronic anemia and CKD stage V not on HD, patient is a poor candidate for LHC/coronary intervention in the absence of an acute coronary syndrome   - agree with transfusion and post transfusion lasix 40mg IV in between units with continued monitoring of hemodynamics   - would hold nifedipine tonight in the setting of his acute anemia and plan for diuresis   - O2 monitoring and supplement as needed  - prior occult blood positive, follow-up per primary     Chronic HFpEF  - plan as above  - daily BMP and close monitoring of renal indices and strict I/O   - no ACEi/ARB given renal indices    Moderate CAD/carotid stenosis s/p CEA   - ASA on hold pending stabilization of H/H    History of VT   - long standing flecainide therapy, resume   - telemetry monitoring       Case discussed with hospitalist and patient's daughter (who is a nurse management)

## 2019-11-12 NOTE — DISCHARGE NOTE PROVIDER - CARE PROVIDER_API CALL
Aleksandar Brooke)  Cardiovascular Disease; Critical Care Medicine; Internal Medicine  [address] [place] [address]  Phone: [phone]  Fax: [phone]  Follow Up Time:     Bay Avendano)  Internal Medicine; Nephrology  [address] [place] [address]  Phone: [phone]  Fax: [phone]  Follow Up Time:     Evangelist Giron)  Gastroenterology; Internal Medicine  [address] [place] [address]  Phone: [phone]  Fax: [phone]  Follow Up Time:

## 2019-11-12 NOTE — DISCHARGE NOTE PROVIDER - HOSPITAL COURSE
Initial HPI:    86 y/o male with PMH of CKD-5 not on HD, Anemia of chronic disease on Aranesp, CHFpEF, arrythmia, DM-2 diet controlled, HTN was sent to the ED from [facility] for low Hb.  He has had increasing SOB and RICHARDS over past several weeks.  Noted Hgb 6.7 on admission with stool OB positive.  Wife reported he has had foul smelling dark stool, belching same odor, over past several weeks.  S/p total of 4 units of pRBCs.  EGD showed mild gastritis.  Colonoscopy showed multiple polyps, AVM, hemorrhoids.  No active bleeding.           Interval History:    Some abdominal cramping overnight, resolved.  Tolerating po intake.  No additional complaints.            # Symptomatic Anemia - multifactorial - anemia of chronic disease, acute blood loss anemia due to GI Bleed.  AVMs, ulcerated polyp, hemorrhoids noted on colonoscopy.  S/p total of 4 units pRBCs.  Hgb has remained stable.  Called [facility] and confirmed - pt had received Aranesp 300mcg on 11/7.  Outpatient Hematology f/u.     # CKD Stage V: renal following.  No plans for HD at present.  Monitor BMP.     # HFpEF: seems euvolemic -  c/w torsemide.  Repeat CXR to assess b/l pleural effusions.      # HTN urgency: BP currently stable.  Continue current antihypertensives.    # hx VT: c/w flecainide 100mg bid     # CAD  Hold Aspirin.  Outpatient Gi followup regarding restarting.          Possible d/c tonight if pt tolerates po intake.  D/w Daughter Vanessa.         Disposition: Stable for discharge.  Outpatient followup discussed.    Total time spent on discharge is  45  minutes. Initial HPI:    84 y/o male with PMH of CKD-5 not on HD, Anemia of chronic disease on Aranesp, CHFpEF, arrythmia, DM-2 diet controlled, HTN was sent to the ED from [facility] for low Hb.  He has had increasing SOB and RICHARDS over past several weeks.  Noted Hgb 6.7 on admission with stool OB positive.  Wife reported he has had foul smelling dark stool, belching same odor, over past several weeks.  S/p total of 4 units of pRBCs.  EGD showed mild gastritis.  Colonoscopy showed multiple polyps, AVM, hemorrhoids.  No active bleeding.           Interval History:    No complaints.  No SOB.  No bleeding.  Wishes to go home.          # Symptomatic Anemia - multifactorial - anemia of chronic disease, acute blood loss anemia due to GI Bleed.  AVMs, ulcerated polyp, hemorrhoids noted on colonoscopy.  S/p total of 4 units pRBCs.  Hgb has remained stable.  Hgb 8.7 has been stable.  No indication of furhter acute bleeding.  Discussed with Dr. Baker, no indication for additional Aranesp, as he had received 300mcg on 11/7.     # CKD Stage V: renal following.  No plans for HD at present.  Monitor BMP.     # HFpEF: seems euvolemic -  c/w torsemide.  Repeat CXR appears to be improved.      # HTN urgency: BP currently stable.  Continue current antihypertensives.    # hx VT: c/w flecainide 100mg bid     # CAD  Hold Aspirin.  Outpatient Gi followup regarding restarting.      ppx: SCD        Stable for d/c home. Discussed with daughter Vanessa. Initial HPI:    84 y/o male with PMH of CKD-5 not on HD, Anemia of chronic disease on Aranesp, CHFpEF, arrythmia, DM-2 diet controlled, HTN was sent to the ED from [facility] for low Hb.  He has had increasing SOB and RICHARDS over past several weeks.  Noted Hgb 6.7 on admission with stool OB positive.  Wife reported he has had foul smelling dark stool, belching same odor, over past several weeks.  S/p total of 4 units of pRBCs.  EGD showed mild gastritis.  Colonoscopy showed multiple polyps, AVM, hemorrhoids.  No active bleeding.           Interval History:    No complaints.  No SOB.  No bleeding.  Wishes to go home.          # Symptomatic Anemia - multifactorial - anemia of chronic disease, acute blood loss anemia due to GI Bleed.  AVMs, ulcerated polyp, hemorrhoids noted on colonoscopy.  S/p total of 4 units pRBCs.  Hgb has remained stable.  Hgb 8.7 has been stable.  No indication of furhter acute bleeding.  Aranesp per Dr. Baker    # CKD Stage V: renal following.  No plans for HD at present.  Monitor BMP.     # HFpEF: seems euvolemic -  c/w torsemide.  Repeat CXR appears to be improved.      # HTN urgency: BP currently stable.  Continue current antihypertensives.    # hx VT: c/w flecainide 100mg bid     # CAD  Hold Aspirin, can restart in am.  D/w Dr. Giron.      ppx: SCD        Stable for d/c home. Discussed with daughter Vanessa.

## 2019-11-12 NOTE — DISCHARGE NOTE PROVIDER - PROVIDER TOKENS
PROVIDER:[TOKEN:[[id number]]],PROVIDER:[TOKEN:[[id number]]],PROVIDER:[TOKEN:[[id number]]]

## 2019-11-12 NOTE — DISCHARGE NOTE PROVIDER - NSDCMRMEDTOKEN_GEN_ALL_CORE_FT
atorvastatin 40 mg oral tablet: 1 tab(s) orally once a day (at bedtime)  calcitriol 0.25 mcg oral capsule: 1 cap(s) orally once a day  ferrous sulfate 325 mg (65 mg elemental iron) oral delayed release tablet: 1 tab(s) orally once a day  flecainide 100 mg oral tablet: 1 tab(s) orally every 12 hours  hydrALAZINE 100 mg oral tablet: orally 3 times a day  metOLazone 5 mg oral tablet: 1 tab(s) orally once a day, As Needed  Nephro-Thomas oral tablet: 1 tab(s) orally once a day  NIFEdipine 90 mg oral tablet, extended release: 1 tab(s) orally once a day  pantoprazole 40 mg oral delayed release tablet: 1 tab(s) orally once a day (before a meal)  torsemide 20 mg oral tablet: 1 tab(s) orally once a day  Vitamin C 250 mg oral tablet: 1 tab(s) orally once a day

## 2019-11-12 NOTE — DISCHARGE NOTE PROVIDER - NSDCCPCAREPLAN_GEN_ALL_CORE_FT
PRINCIPAL DISCHARGE DIAGNOSIS  Diagnosis: GI bleed  Assessment and Plan of Treatment:       SECONDARY DISCHARGE DIAGNOSES  Diagnosis: CKD (chronic kidney disease), stage V  Assessment and Plan of Treatment:     Diagnosis: Inflammatory polyps of colon with rectal bleeding  Assessment and Plan of Treatment:     Diagnosis: Orthopnea  Assessment and Plan of Treatment:

## 2019-11-12 NOTE — CHART NOTE - NSCHARTNOTEFT_GEN_A_CORE
Upon Nutritional Assessment by the Registered Dietitian your patient was determined to meet criteria / has evidence of the following diagnosis/diagnoses:          [x ]  Moderate Protein Calorie Malnutrition         Findings as based on:  •  Comprehensive nutrition assessment and consultation  •  Calorie counts (nutrient intake analysis)  •  Food acceptance and intake status from observations by staff  •  Follow up  •  Patient education  •  Intervention secondary to interdisciplinary rounds  •   concerns      Treatment:    The following diet has been recommended:  Resume DASH/TLC, cons cho diet as tolerated with Glucerna tid    PROVIDER Section:     By signing this assessment you are acknowledging and agree with the diagnosis/diagnoses assigned by the Registered Dietitian    Comments:

## 2019-11-13 ENCOUNTER — TRANSCRIPTION ENCOUNTER (OUTPATIENT)
Age: 84
End: 2019-11-13

## 2019-11-13 VITALS
OXYGEN SATURATION: 98 % | RESPIRATION RATE: 18 BRPM | SYSTOLIC BLOOD PRESSURE: 160 MMHG | TEMPERATURE: 98 F | DIASTOLIC BLOOD PRESSURE: 68 MMHG | HEART RATE: 88 BPM

## 2019-11-13 LAB
ANION GAP SERPL CALC-SCNC: 18 MMOL/L — HIGH (ref 5–17)
BUN SERPL-MCNC: 100 MG/DL — HIGH (ref 8–20)
CALCIUM SERPL-MCNC: 8.5 MG/DL — LOW (ref 8.6–10.2)
CHLORIDE SERPL-SCNC: 108 MMOL/L — HIGH (ref 98–107)
CO2 SERPL-SCNC: 16 MMOL/L — LOW (ref 22–29)
CREAT SERPL-MCNC: 5.89 MG/DL — HIGH (ref 0.5–1.3)
GLUCOSE SERPL-MCNC: 103 MG/DL — SIGNIFICANT CHANGE UP (ref 70–115)
HCT VFR BLD CALC: 28.1 % — LOW (ref 39–50)
HGB BLD-MCNC: 8.7 G/DL — LOW (ref 13–17)
MCHC RBC-ENTMCNC: 30.7 PG — SIGNIFICANT CHANGE UP (ref 27–34)
MCHC RBC-ENTMCNC: 31 GM/DL — LOW (ref 32–36)
MCV RBC AUTO: 99.3 FL — SIGNIFICANT CHANGE UP (ref 80–100)
NRBC # BLD: 2 /100 WBCS — HIGH (ref 0–0)
PLATELET # BLD AUTO: 162 K/UL — SIGNIFICANT CHANGE UP (ref 150–400)
POTASSIUM SERPL-MCNC: 4 MMOL/L — SIGNIFICANT CHANGE UP (ref 3.5–5.3)
POTASSIUM SERPL-SCNC: 4 MMOL/L — SIGNIFICANT CHANGE UP (ref 3.5–5.3)
RBC # BLD: 2.83 M/UL — LOW (ref 4.2–5.8)
RBC # FLD: 19.7 % — HIGH (ref 10.3–14.5)
SODIUM SERPL-SCNC: 142 MMOL/L — SIGNIFICANT CHANGE UP (ref 135–145)
SURGICAL PATHOLOGY STUDY: SIGNIFICANT CHANGE UP
WBC # BLD: 7.72 K/UL — SIGNIFICANT CHANGE UP (ref 3.8–10.5)
WBC # FLD AUTO: 7.72 K/UL — SIGNIFICANT CHANGE UP (ref 3.8–10.5)

## 2019-11-13 PROCEDURE — 99232 SBSQ HOSP IP/OBS MODERATE 35: CPT

## 2019-11-13 PROCEDURE — 99239 HOSP IP/OBS DSCHRG MGMT >30: CPT

## 2019-11-13 RX ORDER — DARBEPOETIN ALFA IN POLYSORBAT 200MCG/0.4
150 PEN INJECTOR (ML) SUBCUTANEOUS ONCE
Refills: 0 | Status: COMPLETED | OUTPATIENT
Start: 2019-11-13 | End: 2019-11-13

## 2019-11-13 RX ADMIN — PANTOPRAZOLE SODIUM 40 MILLIGRAM(S): 20 TABLET, DELAYED RELEASE ORAL at 06:04

## 2019-11-13 RX ADMIN — Medication 500 MILLIGRAM(S): at 10:36

## 2019-11-13 RX ADMIN — Medication 100 MILLIGRAM(S): at 10:36

## 2019-11-13 RX ADMIN — Medication 90 MILLIGRAM(S): at 06:04

## 2019-11-13 RX ADMIN — Medication 150 MICROGRAM(S): at 11:21

## 2019-11-13 RX ADMIN — CALCITRIOL 0.25 MICROGRAM(S): 0.5 CAPSULE ORAL at 10:36

## 2019-11-13 RX ADMIN — Medication 50 MILLIGRAM(S): at 06:04

## 2019-11-13 RX ADMIN — Medication 20 MILLIGRAM(S): at 06:04

## 2019-11-13 NOTE — PROGRESS NOTE ADULT - REASON FOR ADMISSION
I have reviewed discharge instructions with the patient. The patient verbalized understanding.
Symptomatic anemia

## 2019-11-13 NOTE — PROGRESS NOTE ADULT - SUBJECTIVE AND OBJECTIVE BOX
INTERVAL HPI/OVERNIGHT EVENTS: FU for anemia, gi bleeding. s/p EGD and colonoscopy. Now feeling fine. Tolerated the diet.     MEDICATIONS  (STANDING):  ascorbic acid 500 milliGRAM(s) Oral daily  atorvastatin 40 milliGRAM(s) Oral at bedtime  calcitriol   Capsule 0.25 MICROGram(s) Oral daily  epoetin juan Injectable 19735 Unit(s) SubCutaneous <User Schedule>  flecainide 100 milliGRAM(s) Oral two times a day  hydrALAZINE 50 milliGRAM(s) Oral every 6 hours  multivitamin 1 Tablet(s) Oral daily  NIFEdipine XL 90 milliGRAM(s) Oral daily  pantoprazole    Tablet 40 milliGRAM(s) Oral before breakfast  torsemide 20 milliGRAM(s) Oral daily    MEDICATIONS  (PRN):  hydrALAZINE Injectable 10 milliGRAM(s) IV Push every 4 hours PRN sbp>170      Allergies    Plavix (Hives)  Toprol-XL (Rash)    Intolerances        Vital Signs Last 24 Hrs  T(C): 36.9 (13 Nov 2019 06:01), Max: 36.9 (13 Nov 2019 06:01)  T(F): 98.4 (13 Nov 2019 06:01), Max: 98.4 (13 Nov 2019 06:01)  HR: 61 (13 Nov 2019 06:01) (57 - 70)  BP: 149/62 (13 Nov 2019 06:01) (139/51 - 162/62)  BP(mean): --  RR: 18 (13 Nov 2019 06:01) (16 - 18)  SpO2: 97% (13 Nov 2019 06:01) (94% - 98%)    LABS:                        8.7    7.72  )-----------( 162      ( 13 Nov 2019 06:33 )             28.1     11-12    139  |  106  |  100.0<H>  ----------------------------<  210<H>  4.2   |  16.0<L>  |  5.36<H>    Ca    8.7      12 Nov 2019 05:52    TPro  5.7<L>  /  Alb  3.6  /  TBili  0.3<L>  /  DBili  x   /  AST  21  /  ALT  30  /  AlkPhos  84  11-12      Surgical Pathology Report (11.07.19 @ 17:00)    Surgical Pathology Report:   ACCESSION No:  [id number]  CHRISTIANO ELIZABETH        Surgical Final Report          Final Diagnosis  Stomach, random biopsy:  -Gastric mucosa with scattered chronic inflammatory cells  -No H. pylori seen with immunohistochemical stain    Verified by: Charisse Santos MD  (Electronic Signature)  Reported on: 11/12/19 15:13 [facility] [address] [place] [address]  Phone: [phone]   Fax: [phone]  _________________________________________________________________    Comment  H. Pylori test and its performance characteristics have been  evaluated by [facility].    It has not been cleared or approved by the FDA.  The FDA has  determined that such clearance or approval is not necessary.  The  laboratory is regulated under the Clinical Laboratory Improvement  Amendments of 1988 (CLIA) as qualified to perform high complexity  testing.    Clinical History  GI bleed, anemia    Specimen(s) Submitted  1     Random gastric    Gross Description  The specimen is received in formalin, with patient  identification, labeled "random gastric".  It consists of three  fragment(s) of pink-tan soft tissue measuring 0.2-0.4 cm. The  tissue is entirely submitted in one cassette.  H. Pylori stain is  requested.    KV 11/11/2019 14:21        RADIOLOGY & ADDITIONAL TESTS:

## 2019-11-13 NOTE — PROGRESS NOTE ADULT - SUBJECTIVE AND OBJECTIVE BOX
Patient: CHRISTIANO ELIZABETH [id number] 85y Male                           Internal Medicine Hospitalist Progress Note    Initial HPI:  86 y/o male with PMH of CKD-5 not on HD, Anemia of chronic disease on Aranesp, CHFpEF, arrythmia, DM-2 diet controlled, HTN was sent to the ED from [facility] for low Hb.  He has had increasing SOB and RICHRADS over past several weeks.  Noted Hgb 6.7 on admission with stool OB positive.  Wife reported he has had foul smelling dark stool, belching same odor, over past several weeks.  S/p total of 4 units of pRBCs.  EGD showed mild gastritis.  Colonoscopy showed multiple polyps, AVM, hemorrhoids.  No active bleeding.       Interval History:  No complaints.  No SOB.  No bleeding.  Wishes to go home.      ____________________PHYSICAL EXAM:  Vitals reviewed as indicated below  GENERAL:  NAD Alert and Oriented x 3   HEENT: NCAT  CARDIOVASCULAR:  S1, S2  LUNGS: CTAB.   ABDOMEN:  soft, (-) tenderness, (-) distension, (+) bowel sounds, (-) guarding, (-) rebound (-) rigidity  EXTREMITIES:  no cyanosis / clubbing.  Tr edema.   ____________________    VITALS:  Vital Signs Last 24 Hrs  T(C): 36.9 (2019 06:01), Max: 36.9 (2019 06:01)  T(F): 98.4 (2019 06:01), Max: 98.4 (2019 06:01)  HR: 61 (2019 06:01) (57 - 67)  BP: 149/62 (2019 06:01) (139/51 - 162/62)  BP(mean): --  RR: 18 (2019 06:01) (16 - 18)  SpO2: 97% (2019 06:01) (94% - 98%) Daily     Daily Weight in k.6 (2019 04:06)  CAPILLARY BLOOD GLUCOSE        I&O's Summary    2019 07:01  -  2019 07:00  --------------------------------------------------------  IN: 480 mL / OUT: 0 mL / NET: 480 mL        LABS:                        8.7    7.72  )-----------( 162      ( 2019 06:33 )             28.1         142  |  108<H>  |  100.0<H>  ----------------------------<  103  4.0   |  16.0<L>  |  5.89<H>    Ca    8.5<L>      2019 06:33    TPro  5.7<L>  /  Alb  3.6  /  TBili  0.3<L>  /  DBili  x   /  AST  21  /  ALT  30  /  AlkPhos  84  11-12      LIVER FUNCTIONS - ( 2019 05:52 )  Alb: 3.6 g/dL / Pro: 5.7 g/dL / ALK PHOS: 84 U/L / ALT: 30 U/L / AST: 21 U/L / GGT: x                     MEDICATIONS:  ascorbic acid 500 milliGRAM(s) Oral daily  atorvastatin 40 milliGRAM(s) Oral at bedtime  calcitriol   Capsule 0.25 MICROGram(s) Oral daily  darbepoetin Injectable ViaL 150 MICROGram(s) SubCutaneous once  epoetin juan Injectable 10755 Unit(s) SubCutaneous <User Schedule>  flecainide 100 milliGRAM(s) Oral two times a day  hydrALAZINE 50 milliGRAM(s) Oral every 6 hours  hydrALAZINE Injectable 10 milliGRAM(s) IV Push every 4 hours PRN  multivitamin 1 Tablet(s) Oral daily  NIFEdipine XL 90 milliGRAM(s) Oral daily  pantoprazole    Tablet 40 milliGRAM(s) Oral before breakfast  torsemide 20 milliGRAM(s) Oral daily

## 2019-11-13 NOTE — DISCHARGE NOTE NURSING/CASE MANAGEMENT/SOCIAL WORK - PATIENT PORTAL LINK FT
You can access the FollowMyHealth Patient Portal offered by [facility] by registering at the following website: [email or web address]. By joining [employer]’s FollowMyHealth portal, you will also be able to view your health information using other applications (apps) compatible with our system.

## 2019-11-13 NOTE — PROGRESS NOTE ADULT - ASSESSMENT
1. 86 yo male with acute on chronic anemia-had colon polypectomy and transfused.  2. CRI stage iv  3. moderate aortic stenosis/hyperdynamic LV.  4. CAD-asymptomatic  5. carotid disease with prior Rt CEA.
84 y/o male with PMH of CKD-5 not on HD, anemia, CHFpEF, arrythmia, DM-2 diet controlled, HTN was sent to the ED from [facility] for low Hb. As per daughter at bed side, patient has been noticed to have shortness of breath with minimal exertion in the past 2-3 days. Admitted for symptomatic anemia.  Stool OB positive.      # Symptomatic Anemia - multifactorial - anemia of chronic disease, acute blood loss anemia due to GI Bleed.  AVMs, ulcerated polyp, hemorrhoids noted on colonoscopy.  S/p total of 4 units pRBCs.  Hgb has remained stable.  Called [facility] and confirmed - pt had received Aranesp 300mcg on 11/7.  Outpatient Hematology f/u.   # CKD Stage V: renal following.  No plans for HD at present.  Monitor BMP.   # HFpEF: seems euvolemic -  c/w torsemide.  Repeat CXR to assess b/l pleural effusions.    # HTN urgency: BP currently stable.  Continue current antihypertensives.  # hx VT: c/w flecainide 100mg bid   # CAD  Hold Aspirin.  Outpatient Gi followup regarding restarting.    ppx: SCD    Possible d/c tonight if pt tolerates po intake.  D/w Daughter Vanessa.
84 y/o male with PMH of CKD-5 not on HD, anemia, CHFpEF, arrythmia, DM-2 diet controlled, HTN was sent to the ED from [facility] for low Hb. As per daughter at bed side, patient has been noticed to have shortness of breath with minimal exertion in the past 2-3 days. Admitted for symptomatic anemia.  Stool OB positive.      # Symptomatic Anemia - possibly multifactorial - anemia of chronic disease, ? acute blood loss anemia due to GI Bleed.  S/p 2 units pRBCs.  Repeat CBC today confirmed drop in H/H.  Transfuse pRBCs.  Venofer.  Continue Protonix.  EGD in am discussed with GI.  # CKD 5: renal consulted (Dr Avendano).  No plans for HD at present.  Increase in BUN can be attributable to possible UGI Bleed.  Monitor BMP.   # HFpEF: seems euvolemic -  c/w torsemide  # HTN urgency: BP currently stable.  Continue current antihypertensives.  # hx VT: c/w flecainide 100mg bid   # CAD  Hold Aspirin 81mg pending stabilization of H/H Resume as needed   ppx: SCD    Pt not in active CHF.  No medical contraindications to the proposed GI procedure.
84 y/o male with PMH of CKD-5 not on HD, anemia, CHFpEF, arrythmia, DM-2 diet controlled, HTN was sent to the ED from [facility] for low Hb. As per daughter at bed side, patient has been noticed to have shortness of breath with minimal exertion in the past 2-3 days. Admitted for symptomatic anemia.  Stool OB positive.      # Symptomatic Anemia - multifactorial - anemia of chronic disease, acute blood loss anemia due to GI Bleed.  AVMs, ulcerated polyp, hemorrhoids noted on colonoscopy.  S/p total of 4 units pRBCs.  No indication at present for additional transfusion - low diastolic BP may be due to hydralazine.  Monitor BP, Hgb.  Discussed extensively with GI, pt, and daughters.  # CKD Stage V: renal following.  No plans for HD at present.  Monitor BMP.   # HFpEF: seems euvolemic -  c/w torsemide  # HTN urgency: BP currently stable.  Continue current antihypertensives.  # hx VT: c/w flecainide 100mg bid   # CAD  Hold Aspirin 81mg pending stabilization of H/H Resume once cleared by GI.   ppx: SCD
86 y/o male with PMH of CKD-5 not on HD, anemia, CHFpEF, arrythmia, DM-2 diet controlled, HTN was sent to the ED from [facility] for low Hb. As per daughter at bed side, patient has been noticed to have shortness of breath with minimal exertion in the past 2-3 days. Admitted for symptomatic anemia.  Stool OB positive.      # Symptomatic Anemia - multifactorial - anemia of chronic disease, acute blood loss anemia due to GI Bleed.  AVMs, ulcerated polyp, hemorrhoids noted on colonoscopy.  S/p total of 4 units pRBCs.  Hgb has remained stable.  Hgb 8.7 has been stable.  No indication of furhter acute bleeding.  Discussed with Dr. Baker, no indication for additional Aranesp, as he had received 300mcg on 11/7.   # CKD Stage V: renal following.  No plans for HD at present.  Monitor BMP.   # HFpEF: seems euvolemic -  c/w torsemide.  Repeat CXR appears to be improved.    # HTN urgency: BP currently stable.  Continue current antihypertensives.  # hx VT: c/w flecainide 100mg bid   # CAD  Hold Aspirin.  Outpatient Gi followup regarding restarting.    ppx: SCD    Stable for d/c home. Discussed with daughter Vanessa.
86 y/o male with PMH of CKD-5 not on HD, anemia, CHFpEF, arrythmia, DM-2 diet controlled, HTN was sent to the ED from [facility] for low Hb. As per daughter at bed side, patient has been noticed to have shortness of breath with minimal exertion in the past 2-3 days.     symptomatic anemia, likely anemia of chronic disease    s/p 2 U PRBC    trend, maintain hg>8    epogen ordered.    iron panel was not checked prior to PRBC transfusion    venofer    GI eval    add protonix.     CKD 5: renal consulted (Dr Avendano)    likely needs HD in near future.    HFpEF: seems euvolemic    c/w torsemide    HTN urgency:     add torsemide and nifedipine    c/w hydralazine.    hx VT: c/w flecainide 100mg bid     CAD   Hold Aspirin 81mg pending stabilization of H/H  Resume as needed     ppx: SCD    d/w patient and daughter over the phone (daughter is ER RN manager)
86 y/o male with PMH of CKD-5 not on HD, anemia, CHFpEF, arrythmia, DM-2 diet controlled, HTN was sent to the ED from [facility] for low Hb. As per daughter at bed side, patient has been noticed to have shortness of breath with minimal exertion in the past 2-3 days. Admitted for symptomatic anemia.  Stool OB positive.      # Symptomatic Anemia - possibly multifactorial - anemia of chronic disease, acute blood loss anemia due to GI Bleed.  S/p total of 4 units pRBCs.  H/H appears to have appropriately responded to transfusion.  Venofer.  Continue Protonix.  Awaiting EGD.   # CKD Stage V: renal following.  No plans for HD at present.  Monitor BMP.   # HFpEF: seems euvolemic -  c/w torsemide  # HTN urgency: BP currently stable.  Continue current antihypertensives.  # hx VT: c/w flecainide 100mg bid   # CAD  Hold Aspirin 81mg pending stabilization of H/H Resume once cleared by GI.   ppx: SCD    Pt not in active CHF.  No medical contraindications to the proposed GI procedure.
Pt with CKD stage V, admitted for symptomatic anemia.
Patient with acute on chronic anemia. No complaints. Can follow up with me in office in 2-3 weeks
Patient with acute on chronic anemia s/p EGD and colonoscopy. Large polyps were removed. Had vomiting last night. Doing ok this am. Regress to full liquid diet. Hct is stable.  If tolerates full liquid diet, advance diet for lunch or dinner.  Follow up with me in the office
Patient with significant cardiac history, CKD, and now with worsening anemia. Change it to PPI bid.  EGD tomorrow  Give PRBC. Check CBC again tomorrow

## 2019-11-15 PROCEDURE — P9016: CPT

## 2019-11-15 PROCEDURE — 88342 IMHCHEM/IMCYTCHM 1ST ANTB: CPT

## 2019-11-15 PROCEDURE — 83880 ASSAY OF NATRIURETIC PEPTIDE: CPT

## 2019-11-15 PROCEDURE — 97163 PT EVAL HIGH COMPLEX 45 MIN: CPT

## 2019-11-15 PROCEDURE — 86900 BLOOD TYPING SEROLOGIC ABO: CPT

## 2019-11-15 PROCEDURE — 82607 VITAMIN B-12: CPT

## 2019-11-15 PROCEDURE — 36415 COLL VENOUS BLD VENIPUNCTURE: CPT

## 2019-11-15 PROCEDURE — 85610 PROTHROMBIN TIME: CPT

## 2019-11-15 PROCEDURE — 99285 EMERGENCY DEPT VISIT HI MDM: CPT | Mod: 25

## 2019-11-15 PROCEDURE — 88305 TISSUE EXAM BY PATHOLOGIST: CPT

## 2019-11-15 PROCEDURE — 82150 ASSAY OF AMYLASE: CPT

## 2019-11-15 PROCEDURE — 83690 ASSAY OF LIPASE: CPT

## 2019-11-15 PROCEDURE — 84484 ASSAY OF TROPONIN QUANT: CPT

## 2019-11-15 PROCEDURE — 80053 COMPREHEN METABOLIC PANEL: CPT

## 2019-11-15 PROCEDURE — C1889: CPT

## 2019-11-15 PROCEDURE — 86923 COMPATIBILITY TEST ELECTRIC: CPT

## 2019-11-15 PROCEDURE — 93005 ELECTROCARDIOGRAM TRACING: CPT

## 2019-11-15 PROCEDURE — 83735 ASSAY OF MAGNESIUM: CPT

## 2019-11-15 PROCEDURE — 86901 BLOOD TYPING SEROLOGIC RH(D): CPT

## 2019-11-15 PROCEDURE — 86850 RBC ANTIBODY SCREEN: CPT

## 2019-11-15 PROCEDURE — 71045 X-RAY EXAM CHEST 1 VIEW: CPT

## 2019-11-15 PROCEDURE — 36430 TRANSFUSION BLD/BLD COMPNT: CPT

## 2019-11-15 PROCEDURE — 80048 BASIC METABOLIC PNL TOTAL CA: CPT

## 2019-11-15 PROCEDURE — 85027 COMPLETE CBC AUTOMATED: CPT

## 2019-11-15 PROCEDURE — 82272 OCCULT BLD FECES 1-3 TESTS: CPT

## 2019-11-15 PROCEDURE — 85730 THROMBOPLASTIN TIME PARTIAL: CPT

## 2019-11-16 ENCOUNTER — OUTPATIENT (OUTPATIENT)
Dept: OUTPATIENT SERVICES | Facility: HOSPITAL | Age: 84
LOS: 1 days | Discharge: HOME | End: 2019-11-16

## 2019-11-16 DIAGNOSIS — D63.1 ANEMIA IN CHRONIC KIDNEY DISEASE: ICD-10-CM

## 2019-11-16 DIAGNOSIS — Z98.890 OTHER SPECIFIED POSTPROCEDURAL STATES: Chronic | ICD-10-CM

## 2019-11-16 DIAGNOSIS — Z98.62 PERIPHERAL VASCULAR ANGIOPLASTY STATUS: Chronic | ICD-10-CM

## 2019-11-16 DIAGNOSIS — N18.5 CHRONIC KIDNEY DISEASE, STAGE 5: ICD-10-CM

## 2019-11-16 DIAGNOSIS — I77.0 ARTERIOVENOUS FISTULA, ACQUIRED: Chronic | ICD-10-CM

## 2019-11-20 ENCOUNTER — APPOINTMENT (OUTPATIENT)
Dept: HEMATOLOGY ONCOLOGY | Facility: CLINIC | Age: 84
End: 2019-11-20

## 2019-11-20 ENCOUNTER — RESULT REVIEW (OUTPATIENT)
Age: 84
End: 2019-11-20

## 2019-11-20 ENCOUNTER — APPOINTMENT (OUTPATIENT)
Age: 84
End: 2019-11-20

## 2019-11-20 LAB
BASOPHILS # BLD AUTO: 0.1 K/UL — SIGNIFICANT CHANGE UP (ref 0–0.2)
BASOPHILS NFR BLD AUTO: 1.6 % — SIGNIFICANT CHANGE UP (ref 0–2)
EOSINOPHIL # BLD AUTO: 0.2 K/UL — SIGNIFICANT CHANGE UP (ref 0–0.5)
EOSINOPHIL NFR BLD AUTO: 3 % — SIGNIFICANT CHANGE UP (ref 0–6)
HCT VFR BLD CALC: 37 % — LOW (ref 39–50)
HGB BLD-MCNC: 11.5 G/DL — LOW (ref 13–17)
LYMPHOCYTES # BLD AUTO: 0.7 K/UL — LOW (ref 1–3.3)
LYMPHOCYTES # BLD AUTO: 8.9 % — LOW (ref 13–44)
MCHC RBC-ENTMCNC: 30.2 PG — SIGNIFICANT CHANGE UP (ref 27–34)
MCHC RBC-ENTMCNC: 31.1 G/DL — LOW (ref 32–36)
MCV RBC AUTO: 97.1 FL — SIGNIFICANT CHANGE UP (ref 80–100)
MONOCYTES # BLD AUTO: 0.6 K/UL — SIGNIFICANT CHANGE UP (ref 0–0.9)
MONOCYTES NFR BLD AUTO: 7.7 % — SIGNIFICANT CHANGE UP (ref 2–14)
NEUTROPHILS # BLD AUTO: 6.6 K/UL — SIGNIFICANT CHANGE UP (ref 1.8–7.4)
NEUTROPHILS NFR BLD AUTO: 78.9 % — HIGH (ref 43–77)
PLATELET # BLD AUTO: 216 K/UL — SIGNIFICANT CHANGE UP (ref 150–400)
RBC # BLD: 3.81 M/UL — LOW (ref 4.2–5.8)
RBC # FLD: 17.7 % — HIGH (ref 10.3–14.5)
WBC # BLD: 8.4 K/UL — SIGNIFICANT CHANGE UP (ref 3.8–10.5)
WBC # FLD AUTO: 8.4 K/UL — SIGNIFICANT CHANGE UP (ref 3.8–10.5)

## 2019-11-25 ENCOUNTER — APPOINTMENT (OUTPATIENT)
Dept: NEPHROLOGY | Facility: CLINIC | Age: 84
End: 2019-11-25
Payer: MEDICARE

## 2019-11-25 VITALS
WEIGHT: 164 LBS | BODY MASS INDEX: 27.32 KG/M2 | DIASTOLIC BLOOD PRESSURE: 64 MMHG | SYSTOLIC BLOOD PRESSURE: 158 MMHG | HEIGHT: 65 IN | HEART RATE: 73 BPM

## 2019-11-25 DIAGNOSIS — Z92.29 PERSONAL HISTORY OF OTHER DRUG THERAPY: ICD-10-CM

## 2019-11-25 DIAGNOSIS — M75.51 BURSITIS OF RIGHT SHOULDER: ICD-10-CM

## 2019-11-25 DIAGNOSIS — I49.9 CARDIAC ARRHYTHMIA, UNSPECIFIED: ICD-10-CM

## 2019-11-25 DIAGNOSIS — M25.511 PAIN IN RIGHT SHOULDER: ICD-10-CM

## 2019-11-25 DIAGNOSIS — N18.9 CHRONIC KIDNEY DISEASE, UNSPECIFIED: ICD-10-CM

## 2019-11-25 DIAGNOSIS — M25.562 PAIN IN LEFT KNEE: ICD-10-CM

## 2019-11-25 DIAGNOSIS — S63.501A UNSPECIFIED SPRAIN OF RIGHT WRIST, INITIAL ENCOUNTER: ICD-10-CM

## 2019-11-25 DIAGNOSIS — D63.1 CHRONIC KIDNEY DISEASE, UNSPECIFIED: ICD-10-CM

## 2019-11-25 DIAGNOSIS — M17.12 UNILATERAL PRIMARY OSTEOARTHRITIS, LEFT KNEE: ICD-10-CM

## 2019-11-25 PROCEDURE — 36415 COLL VENOUS BLD VENIPUNCTURE: CPT

## 2019-11-25 PROCEDURE — 99215 OFFICE O/P EST HI 40 MIN: CPT | Mod: 25

## 2019-11-25 NOTE — ASSESSMENT
[FreeTextEntry1] : ESRD\par \par medications/labs reviewed\par \par increased torsemide dose to 40mg daily\par \par New labs ordered\par \par f/u 2 weeks\par

## 2019-11-25 NOTE — HISTORY OF PRESENT ILLNESS
[Stage 5] : stage 5 [FreeTextEntry1] : DM - 2 ~ > 25 years, HX., of Cardiac arrhythmias ( V.Bigeminy )\par HTN X ~ 10 years ; \par R - S/P CEA,\par ESRD\par \par Compliant w. Diet , \par Declined early initiation of HD , enrolled in H.T. program\par AVF +\par \par \par

## 2019-11-25 NOTE — PHYSICAL EXAM
[General Appearance - Alert] : alert [General Appearance - In No Acute Distress] : in no acute distress [General Appearance - Well Developed] : well developed [Sclera] : the sclera and conjunctiva were normal [PERRL With Normal Accommodation] : pupils were equal in size, round, and reactive to light [Extraocular Movements] : extraocular movements were intact [Strabismus] : no strabismus was seen [Retina Vessels Narrowing Of Arterioles] : narrowing of the arterioles noted [Outer Ear] : the ears and nose were normal in appearance [Hearing Threshold Finger Rub Not Dougherty] : hearing was normal [Examination Of The Oral Cavity] : the lips and gums were normal [Neck Appearance] : the appearance of the neck was normal [Neck Cervical Mass (___cm)] : no neck mass was observed [Jugular Venous Distention Increased] : there was no jugular-venous distention [Respiration, Rhythm And Depth] : normal respiratory rhythm and effort [Exaggerated Use Of Accessory Muscles For Inspiration] : no accessory muscle use [Scattered Wheezes] : scattered wheezing [Bibasilar Rales/Crackles] : bibasilar rales [Decreased Breath Sounds] : decreased breath sounds [Apical Impulse] : the apical impulse was normal [Heart Rate And Rhythm] : heart rate was normal and rhythm regular [Heart Sounds Gallop] : no gallops [Heart Sounds] : normal S1 and S2 [Systolic grade ___/6] : A grade [unfilled]/6 systolic murmur was heard. [Heart Sounds Pericardial Friction Rub] : no pericardial rub [Murmurs] : no murmurs [Arterial Pulses Carotid] : carotid pulses were normal with no bruits [Pitting Edema] : pitting edema present [Full Pulse] : the pedal pulses are present [___ +] : bilateral [unfilled]+ pitting edema to the ankles [Bowel Sounds] : normal bowel sounds [Abdomen Soft] : soft [Abdomen Tenderness] : non-tender [Abdomen Mass (___ Cm)] : no abdominal mass palpated [Abdomen Hernia] : no hernia was discovered [Cervical Lymph Nodes Enlarged Posterior Bilaterally] : posterior cervical [Cervical Lymph Nodes Enlarged Anterior Bilaterally] : anterior cervical [No CVA Tenderness] : no ~M costovertebral angle tenderness [Abnormal Walk] : normal gait [Involuntary Movements] : no involuntary movements were seen [___ (cm) Fistula] : [unfilled] (cm) fistula [Thrill] : a thrill was present [Bruit] : a bruit was present [Skin Turgor] : normal skin turgor [Skin Color & Pigmentation] : normal skin color and pigmentation [] : no rash [Skin Lesions] : no skin lesions [Cranial Nerves] : cranial nerves 2-12 were intact [Impaired Insight] : insight and judgment were intact [Oriented To Time, Place, And Person] : oriented to person, place, and time [Affect] : the affect was normal [Mood] : the mood was normal [Memory Remote] : remote memory was not impaired [Memory Recent] : recent memory was not impaired [Auscultation Breath Sounds / Voice Sounds] : lungs were clear to auscultation bilaterally [FreeTextEntry1] : R- Carotid Bruit ;

## 2019-11-26 LAB
ALBUMIN SERPL ELPH-MCNC: 3.7 G/DL
ANION GAP SERPL CALC-SCNC: 15 MMOL/L
BASOPHILS # BLD AUTO: 0.11 K/UL
BASOPHILS NFR BLD AUTO: 1.5 %
BUN SERPL-MCNC: 85 MG/DL
CALCIUM SERPL-MCNC: 9 MG/DL
CHLORIDE SERPL-SCNC: 111 MMOL/L
CO2 SERPL-SCNC: 16 MMOL/L
CREAT SERPL-MCNC: 5.24 MG/DL
EOSINOPHIL # BLD AUTO: 0.12 K/UL
EOSINOPHIL NFR BLD AUTO: 1.6 %
GLUCOSE SERPL-MCNC: 108 MG/DL
HCT VFR BLD CALC: 38.2 %
HGB BLD-MCNC: 11.5 G/DL
IMM GRANULOCYTES NFR BLD AUTO: 0.4 %
LYMPHOCYTES # BLD AUTO: 0.73 K/UL
LYMPHOCYTES NFR BLD AUTO: 9.7 %
MAN DIFF?: NORMAL
MCHC RBC-ENTMCNC: 30.1 GM/DL
MCHC RBC-ENTMCNC: 30.3 PG
MCV RBC AUTO: 100.5 FL
MONOCYTES # BLD AUTO: 0.79 K/UL
MONOCYTES NFR BLD AUTO: 10.5 %
NEUTROPHILS # BLD AUTO: 5.76 K/UL
NEUTROPHILS NFR BLD AUTO: 76.3 %
PHOSPHATE SERPL-MCNC: 5.9 MG/DL
PLATELET # BLD AUTO: 229 K/UL
POTASSIUM SERPL-SCNC: 4.4 MMOL/L
RBC # BLD: 3.8 M/UL
RBC # FLD: 17.9 %
SODIUM SERPL-SCNC: 142 MMOL/L
WBC # FLD AUTO: 7.54 K/UL

## 2019-12-04 ENCOUNTER — RESULT REVIEW (OUTPATIENT)
Age: 84
End: 2019-12-04

## 2019-12-04 ENCOUNTER — APPOINTMENT (OUTPATIENT)
Age: 84
End: 2019-12-04

## 2019-12-04 LAB
BASOPHILS # BLD AUTO: 0.1 K/UL — SIGNIFICANT CHANGE UP (ref 0–0.2)
BASOPHILS NFR BLD AUTO: 1.9 % — SIGNIFICANT CHANGE UP (ref 0–2)
EOSINOPHIL # BLD AUTO: 0.1 K/UL — SIGNIFICANT CHANGE UP (ref 0–0.5)
EOSINOPHIL NFR BLD AUTO: 2 % — SIGNIFICANT CHANGE UP (ref 0–6)
HCT VFR BLD CALC: 35.6 % — LOW (ref 39–50)
HGB BLD-MCNC: 11 G/DL — LOW (ref 13–17)
LYMPHOCYTES # BLD AUTO: 0.8 K/UL — LOW (ref 1–3.3)
LYMPHOCYTES # BLD AUTO: 13.3 % — SIGNIFICANT CHANGE UP (ref 13–44)
MCHC RBC-ENTMCNC: 29.5 PG — SIGNIFICANT CHANGE UP (ref 27–34)
MCHC RBC-ENTMCNC: 30.8 G/DL — LOW (ref 32–36)
MCV RBC AUTO: 95.9 FL — SIGNIFICANT CHANGE UP (ref 80–100)
MONOCYTES # BLD AUTO: 0.4 K/UL — SIGNIFICANT CHANGE UP (ref 0–0.9)
MONOCYTES NFR BLD AUTO: 6.7 % — SIGNIFICANT CHANGE UP (ref 2–14)
NEUTROPHILS # BLD AUTO: 4.5 K/UL — SIGNIFICANT CHANGE UP (ref 1.8–7.4)
NEUTROPHILS NFR BLD AUTO: 76 % — SIGNIFICANT CHANGE UP (ref 43–77)
PLATELET # BLD AUTO: 160 K/UL — SIGNIFICANT CHANGE UP (ref 150–400)
RBC # BLD: 3.71 M/UL — LOW (ref 4.2–5.8)
RBC # FLD: 15.2 % — HIGH (ref 10.3–14.5)
WBC # BLD: 5.9 K/UL — SIGNIFICANT CHANGE UP (ref 3.8–10.5)
WBC # FLD AUTO: 5.9 K/UL — SIGNIFICANT CHANGE UP (ref 3.8–10.5)

## 2019-12-09 ENCOUNTER — APPOINTMENT (OUTPATIENT)
Dept: NEPHROLOGY | Facility: CLINIC | Age: 84
End: 2019-12-09

## 2019-12-16 ENCOUNTER — OUTPATIENT (OUTPATIENT)
Dept: OUTPATIENT SERVICES | Facility: HOSPITAL | Age: 84
LOS: 1 days | Discharge: HOME | End: 2019-12-16

## 2019-12-16 DIAGNOSIS — Z98.890 OTHER SPECIFIED POSTPROCEDURAL STATES: Chronic | ICD-10-CM

## 2019-12-16 DIAGNOSIS — D63.1 ANEMIA IN CHRONIC KIDNEY DISEASE: ICD-10-CM

## 2019-12-16 DIAGNOSIS — Z98.62 PERIPHERAL VASCULAR ANGIOPLASTY STATUS: Chronic | ICD-10-CM

## 2019-12-16 DIAGNOSIS — N18.5 CHRONIC KIDNEY DISEASE, STAGE 5: ICD-10-CM

## 2019-12-16 DIAGNOSIS — I77.0 ARTERIOVENOUS FISTULA, ACQUIRED: Chronic | ICD-10-CM

## 2019-12-17 ENCOUNTER — APPOINTMENT (OUTPATIENT)
Dept: VASCULAR SURGERY | Facility: CLINIC | Age: 84
End: 2019-12-17

## 2019-12-18 ENCOUNTER — APPOINTMENT (OUTPATIENT)
Age: 84
End: 2019-12-18

## 2019-12-18 ENCOUNTER — RESULT REVIEW (OUTPATIENT)
Age: 84
End: 2019-12-18

## 2019-12-18 ENCOUNTER — APPOINTMENT (OUTPATIENT)
Dept: HEMATOLOGY ONCOLOGY | Facility: CLINIC | Age: 84
End: 2019-12-18

## 2019-12-18 ENCOUNTER — INPATIENT (INPATIENT)
Facility: HOSPITAL | Age: 84
LOS: 3 days | Discharge: HOME | DRG: 291 | End: 2019-12-22
Attending: INTERNAL MEDICINE | Admitting: INTERNAL MEDICINE
Payer: MEDICARE

## 2019-12-18 VITALS
SYSTOLIC BLOOD PRESSURE: 143 MMHG | OXYGEN SATURATION: 97 % | RESPIRATION RATE: 26 BRPM | DIASTOLIC BLOOD PRESSURE: 88 MMHG | HEART RATE: 72 BPM

## 2019-12-18 DIAGNOSIS — Z98.890 OTHER SPECIFIED POSTPROCEDURAL STATES: Chronic | ICD-10-CM

## 2019-12-18 DIAGNOSIS — I50.9 HEART FAILURE, UNSPECIFIED: ICD-10-CM

## 2019-12-18 DIAGNOSIS — I77.0 ARTERIOVENOUS FISTULA, ACQUIRED: Chronic | ICD-10-CM

## 2019-12-18 DIAGNOSIS — Z98.62 PERIPHERAL VASCULAR ANGIOPLASTY STATUS: Chronic | ICD-10-CM

## 2019-12-18 LAB
ALBUMIN SERPL ELPH-MCNC: 4.2 G/DL — SIGNIFICANT CHANGE UP (ref 3.3–5.2)
ALP SERPL-CCNC: 105 U/L — SIGNIFICANT CHANGE UP (ref 40–120)
ALT FLD-CCNC: 31 U/L — SIGNIFICANT CHANGE UP
ANION GAP SERPL CALC-SCNC: 19 MMOL/L — HIGH (ref 5–17)
APTT BLD: 37.8 SEC — HIGH (ref 27.5–36.3)
AST SERPL-CCNC: 23 U/L — SIGNIFICANT CHANGE UP
BASOPHILS # BLD AUTO: 0.1 K/UL — SIGNIFICANT CHANGE UP (ref 0–0.2)
BASOPHILS # BLD AUTO: 0.11 K/UL — SIGNIFICANT CHANGE UP (ref 0–0.2)
BASOPHILS NFR BLD AUTO: 1.1 % — SIGNIFICANT CHANGE UP (ref 0–2)
BASOPHILS NFR BLD AUTO: 1.3 % — SIGNIFICANT CHANGE UP (ref 0–2)
BILIRUB SERPL-MCNC: 0.4 MG/DL — SIGNIFICANT CHANGE UP (ref 0.4–2)
BLD GP AB SCN SERPL QL: SIGNIFICANT CHANGE UP
BUN SERPL-MCNC: 101 MG/DL — HIGH (ref 8–20)
CALCIUM SERPL-MCNC: 9.8 MG/DL — SIGNIFICANT CHANGE UP (ref 8.6–10.2)
CHLORIDE SERPL-SCNC: 105 MMOL/L — SIGNIFICANT CHANGE UP (ref 98–107)
CO2 SERPL-SCNC: 15 MMOL/L — LOW (ref 22–29)
CREAT SERPL-MCNC: 5.3 MG/DL — HIGH (ref 0.5–1.3)
EOSINOPHIL # BLD AUTO: 0.15 K/UL — SIGNIFICANT CHANGE UP (ref 0–0.5)
EOSINOPHIL # BLD AUTO: 0.2 K/UL — SIGNIFICANT CHANGE UP (ref 0–0.5)
EOSINOPHIL NFR BLD AUTO: 1.5 % — SIGNIFICANT CHANGE UP (ref 0–6)
EOSINOPHIL NFR BLD AUTO: 2.1 % — SIGNIFICANT CHANGE UP (ref 0–6)
GLUCOSE SERPL-MCNC: 154 MG/DL — HIGH (ref 70–115)
HCT VFR BLD CALC: 26.3 % — LOW (ref 39–50)
HCT VFR BLD CALC: 27.8 % — LOW (ref 39–50)
HGB BLD-MCNC: 8.6 G/DL — LOW (ref 13–17)
HGB BLD-MCNC: 8.6 G/DL — LOW (ref 13–17)
IMM GRANULOCYTES NFR BLD AUTO: 0.5 % — SIGNIFICANT CHANGE UP (ref 0–1.5)
INR BLD: 1.07 RATIO — SIGNIFICANT CHANGE UP (ref 0.88–1.16)
LYMPHOCYTES # BLD AUTO: 0.7 K/UL — LOW (ref 1–3.3)
LYMPHOCYTES # BLD AUTO: 0.72 K/UL — LOW (ref 1–3.3)
LYMPHOCYTES # BLD AUTO: 7.4 % — LOW (ref 13–44)
LYMPHOCYTES # BLD AUTO: 8.2 % — LOW (ref 13–44)
MCHC RBC-ENTMCNC: 29.4 PG — SIGNIFICANT CHANGE UP (ref 27–34)
MCHC RBC-ENTMCNC: 30.3 PG — SIGNIFICANT CHANGE UP (ref 27–34)
MCHC RBC-ENTMCNC: 30.9 GM/DL — LOW (ref 32–36)
MCHC RBC-ENTMCNC: 32.7 G/DL — SIGNIFICANT CHANGE UP (ref 32–36)
MCV RBC AUTO: 92.7 FL — SIGNIFICANT CHANGE UP (ref 80–100)
MCV RBC AUTO: 94.9 FL — SIGNIFICANT CHANGE UP (ref 80–100)
MONOCYTES # BLD AUTO: 0.6 K/UL — SIGNIFICANT CHANGE UP (ref 0–0.9)
MONOCYTES # BLD AUTO: 0.65 K/UL — SIGNIFICANT CHANGE UP (ref 0–0.9)
MONOCYTES NFR BLD AUTO: 6.7 % — SIGNIFICANT CHANGE UP (ref 2–14)
MONOCYTES NFR BLD AUTO: 6.7 % — SIGNIFICANT CHANGE UP (ref 2–14)
NEUTROPHILS # BLD AUTO: 7.4 K/UL — SIGNIFICANT CHANGE UP (ref 1.8–7.4)
NEUTROPHILS # BLD AUTO: 8.06 K/UL — HIGH (ref 1.8–7.4)
NEUTROPHILS NFR BLD AUTO: 81.7 % — HIGH (ref 43–77)
NEUTROPHILS NFR BLD AUTO: 82.8 % — HIGH (ref 43–77)
NT-PROBNP SERPL-SCNC: HIGH PG/ML (ref 0–300)
OB PNL STL: POSITIVE
PLATELET # BLD AUTO: 218 K/UL — SIGNIFICANT CHANGE UP (ref 150–400)
PLATELET # BLD AUTO: 261 K/UL — SIGNIFICANT CHANGE UP (ref 150–400)
POTASSIUM SERPL-MCNC: 5 MMOL/L — SIGNIFICANT CHANGE UP (ref 3.5–5.3)
POTASSIUM SERPL-SCNC: 5 MMOL/L — SIGNIFICANT CHANGE UP (ref 3.5–5.3)
PROT SERPL-MCNC: 7.1 G/DL — SIGNIFICANT CHANGE UP (ref 6.6–8.7)
PROTHROM AB SERPL-ACNC: 12.3 SEC — SIGNIFICANT CHANGE UP (ref 10–12.9)
RBC # BLD: 2.83 M/UL — LOW (ref 4.2–5.8)
RBC # BLD: 2.93 M/UL — LOW (ref 4.2–5.8)
RBC # FLD: 14.5 % — SIGNIFICANT CHANGE UP (ref 10.3–14.5)
RBC # FLD: 14.9 % — HIGH (ref 10.3–14.5)
SODIUM SERPL-SCNC: 139 MMOL/L — SIGNIFICANT CHANGE UP (ref 135–145)
TROPONIN T SERPL-MCNC: 0.06 NG/ML — SIGNIFICANT CHANGE UP (ref 0–0.06)
WBC # BLD: 9.1 K/UL — SIGNIFICANT CHANGE UP (ref 3.8–10.5)
WBC # BLD: 9.74 K/UL — SIGNIFICANT CHANGE UP (ref 3.8–10.5)
WBC # FLD AUTO: 9.1 K/UL — SIGNIFICANT CHANGE UP (ref 3.8–10.5)
WBC # FLD AUTO: 9.74 K/UL — SIGNIFICANT CHANGE UP (ref 3.8–10.5)

## 2019-12-18 PROCEDURE — 93010 ELECTROCARDIOGRAM REPORT: CPT

## 2019-12-18 PROCEDURE — 99223 1ST HOSP IP/OBS HIGH 75: CPT

## 2019-12-18 PROCEDURE — 99285 EMERGENCY DEPT VISIT HI MDM: CPT

## 2019-12-18 PROCEDURE — 71045 X-RAY EXAM CHEST 1 VIEW: CPT | Mod: 26

## 2019-12-18 RX ORDER — ASPIRIN/CALCIUM CARB/MAGNESIUM 324 MG
81 TABLET ORAL DAILY
Refills: 0 | Status: DISCONTINUED | OUTPATIENT
Start: 2019-12-18 | End: 2019-12-22

## 2019-12-18 RX ORDER — PANTOPRAZOLE SODIUM 20 MG/1
40 TABLET, DELAYED RELEASE ORAL
Refills: 0 | Status: DISCONTINUED | OUTPATIENT
Start: 2019-12-18 | End: 2019-12-22

## 2019-12-18 RX ORDER — FERROUS SULFATE 325(65) MG
325 TABLET ORAL THREE TIMES A DAY
Refills: 0 | Status: DISCONTINUED | OUTPATIENT
Start: 2019-12-18 | End: 2019-12-20

## 2019-12-18 RX ORDER — FLECAINIDE ACETATE 50 MG
100 TABLET ORAL EVERY 12 HOURS
Refills: 0 | Status: DISCONTINUED | OUTPATIENT
Start: 2019-12-18 | End: 2019-12-19

## 2019-12-18 RX ORDER — ASCORBIC ACID 60 MG
500 TABLET,CHEWABLE ORAL DAILY
Refills: 0 | Status: DISCONTINUED | OUTPATIENT
Start: 2019-12-18 | End: 2019-12-22

## 2019-12-18 RX ORDER — FUROSEMIDE 40 MG
10 TABLET ORAL
Qty: 500 | Refills: 0 | Status: DISCONTINUED | OUTPATIENT
Start: 2019-12-18 | End: 2019-12-20

## 2019-12-18 RX ORDER — PANTOPRAZOLE SODIUM 20 MG/1
80 TABLET, DELAYED RELEASE ORAL ONCE
Refills: 0 | Status: COMPLETED | OUTPATIENT
Start: 2019-12-18 | End: 2019-12-18

## 2019-12-18 RX ORDER — NIFEDIPINE 30 MG
60 TABLET, EXTENDED RELEASE 24 HR ORAL AT BEDTIME
Refills: 0 | Status: DISCONTINUED | OUTPATIENT
Start: 2019-12-18 | End: 2019-12-22

## 2019-12-18 RX ORDER — FUROSEMIDE 40 MG
80 TABLET ORAL ONCE
Refills: 0 | Status: COMPLETED | OUTPATIENT
Start: 2019-12-18 | End: 2019-12-18

## 2019-12-18 RX ORDER — NIFEDIPINE 30 MG
90 TABLET, EXTENDED RELEASE 24 HR ORAL DAILY
Refills: 0 | Status: DISCONTINUED | OUTPATIENT
Start: 2019-12-18 | End: 2019-12-22

## 2019-12-18 RX ORDER — CALCITRIOL 0.5 UG/1
0.25 CAPSULE ORAL DAILY
Refills: 0 | Status: DISCONTINUED | OUTPATIENT
Start: 2019-12-18 | End: 2019-12-22

## 2019-12-18 RX ORDER — ATORVASTATIN CALCIUM 80 MG/1
40 TABLET, FILM COATED ORAL AT BEDTIME
Refills: 0 | Status: DISCONTINUED | OUTPATIENT
Start: 2019-12-18 | End: 2019-12-22

## 2019-12-18 RX ORDER — HYDRALAZINE HCL 50 MG
100 TABLET ORAL EVERY 8 HOURS
Refills: 0 | Status: DISCONTINUED | OUTPATIENT
Start: 2019-12-18 | End: 2019-12-22

## 2019-12-18 RX ADMIN — Medication 325 MILLIGRAM(S): at 23:45

## 2019-12-18 RX ADMIN — Medication 100 MILLIGRAM(S): at 22:10

## 2019-12-18 RX ADMIN — Medication 60 MILLIGRAM(S): at 22:10

## 2019-12-18 RX ADMIN — Medication 80 MILLIGRAM(S): at 17:46

## 2019-12-18 RX ADMIN — PANTOPRAZOLE SODIUM 80 MILLIGRAM(S): 20 TABLET, DELAYED RELEASE ORAL at 17:46

## 2019-12-18 RX ADMIN — Medication 5 MG/HR: at 21:07

## 2019-12-18 RX ADMIN — ATORVASTATIN CALCIUM 40 MILLIGRAM(S): 80 TABLET, FILM COATED ORAL at 22:09

## 2019-12-18 NOTE — H&P ADULT - ASSESSMENT
86 yo M w/ hx CKD 5 not on HD, anemia of chronic disease on aricept, recent GI bleed s/p colonoscopy and polyp resection, EGD with gastritis, arrhythmia on flecainide presents to ER from [facility] for orthopnea and shortness of breath. family declining HD. compliant with meds and low salt diet.    fluid overload due to advanced renal disease    echo given murmur    lasix drip    cardiology and renal consulted    patient should either start HD (twice weekly?) or increase torsemide to max dose and use metolazone more consistent (3x week?)    anemia of chronic disease:    protonix, ferrous sulfate   trend    arrhythmia: flecainide   HTN: hydralazine, imdur 90am and 60pm    dvt ppx: SCD  hold chemical ppx given hx of gi bleed.    updated family at bedside. d/w nephro

## 2019-12-18 NOTE — CONSULT NOTE ADULT - SUBJECTIVE AND OBJECTIVE BOX
[facility]                                   [address] [place] [address]                                                      PHONE: [phone]                                                         FAX: [phone]  [email or web address]  ---------------------------------------------------------------------------------------------------------------------------------    Reason for Consult: SOB    HPI:  CHRISTIANO ELIZABETH is an 85y Male h/o CKD not yet on HD, s/p L arm AV fistula, chronic anemia, non-obstructive CAD, recent GI bleed s/p colonoscopy and polyp resection, EGD with gastritis, ventricular arrhythmia on flecainide per Dr. Zana Palma at [facility], DM, carotid disease s/p right carotid endarterectomy who presents c/o orthopnea, shortness of breath, and LE edema x 3 days. He may have forgotten to take his diuretic on occassion. He was given IV lasix 80mg in ER. Family at bedside.      PAST MEDICAL & SURGICAL HISTORY:  Risk factors for obstructive sleep apnea  Anemia  RICHARDS (dyspnea on exertion)  VT (ventricular tachycardia)  HTN (hypertension)  CAD (coronary artery disease)  CKD (chronic kidney disease): stage IV  Arrhythmia  AV block, 1st degree  DM (diabetes mellitus)  H/O carotid endarterectomy: Right  A-V fistula: left arm 5/2017  H/O angioplasty: 2013,  no  intervention  H/O left knee surgery  H/O circumcision: at  age  65      Plavix (Hives)  Toprol-XL (Rash)      MEDICATIONS  (STANDING):  ascorbic acid 500 milliGRAM(s) Oral daily  aspirin enteric coated 81 milliGRAM(s) Oral daily  atorvastatin 40 milliGRAM(s) Oral at bedtime  calcitriol   Capsule 0.25 MICROGram(s) Oral daily  ferrous    sulfate 325 milliGRAM(s) Oral three times a day  flecainide 100 milliGRAM(s) Oral every 12 hours  furosemide Infusion 10 mG/Hr (5 mL/Hr) IV Continuous <Continuous>  hydrALAZINE 100 milliGRAM(s) Oral every 8 hours  Nephro-pito 1 Tablet(s) Oral daily  NIFEdipine XL 90 milliGRAM(s) Oral daily  NIFEdipine XL 60 milliGRAM(s) Oral at bedtime  pantoprazole    Tablet 40 milliGRAM(s) Oral before breakfast    MEDICATIONS  (PRN):      Social History:  Cigarettes:      no              Alchohol:   no              Illicit Drug Abuse:  no    ROS: Negative other than as mentioned in HPI.    Vital Signs Last 24 Hrs  T(C): --  T(F): --  HR: 71 (18 Dec 2019 19:53) (68 - 75)  BP: 159/68 (18 Dec 2019 19:53) (125/55 - 159/68)  BP(mean): 111 (18 Dec 2019 15:30) (111 - 111)  RR: 18 (18 Dec 2019 19:53) (18 - 26)  SpO2: 97% (18 Dec 2019 19:53) (91% - 97%)  ICU Vital Signs Last 24 Hrs  CHRISTIANO ELIZABETH  I&O's Detail    18 Dec 2019 07:01  -  18 Dec 2019 20:38  --------------------------------------------------------  IN:  Total IN: 0 mL    OUT:    Voided: 175 mL  Total OUT: 175 mL    Total NET: -175 mL        I&O's Summary    18 Dec 2019 07:01  -  18 Dec 2019 20:38  --------------------------------------------------------  IN: 0 mL / OUT: 175 mL / NET: -175 mL      Drug Dosing Weight  CHRISTIANO ELIZABETH      PHYSICAL EXAM:  General: NAD.  HEENT: Head; normocephalic.  Eyes: Pupils reactive.  Neck: Supple.  CARDIOVASCULAR: Normal S1 and S2.   LUNGS: Decreased breath sounds bilaterally at bases and 1/2 up.  ABDOMEN: Soft, nontender.  EXTREMITIES: LE edema pitting at ankles.  SKIN: warm.  NEURO: Alert/oriented x 3.    PSYCH: normal affect.        LABS:                        8.6    9.74  )-----------( 261      ( 18 Dec 2019 16:26 )             27.8     12-18    139  |  105  |  101.0<H>  ----------------------------<  154<H>  5.0   |  15.0<L>  |  5.30<H>    Ca    9.8      18 Dec 2019 16:26    TPro  7.1  /  Alb  4.2  /  TBili  0.4  /  DBili  x   /  AST  23  /  ALT  31  /  AlkPhos  105  12-18    CHRISTIANO ELIZABETH  CARDIAC MARKERS ( 18 Dec 2019 16:26 )  x     / 0.06 ng/mL / x     / x     / x          PT/INR - ( 18 Dec 2019 16:26 )   PT: 12.3 sec;   INR: 1.07 ratio         PTT - ( 18 Dec 2019 16:26 )  PTT:37.8 sec      RADIOLOGY & ADDITIONAL STUDIES:    INTERPRETATION OF TELEMETRY (personally reviewed): NSR    ECG: NSR with 1st degree AVB at 69 bpm    ECHO:  1. Left ventricular ejection fraction, by visual estimation, is >75%.   2. Hyperdynamic global left ventricular systolic function.   3. Normal left ventricular internal cavity size.   4. Spectral Doppler shows pseudonormal pattern of left ventricular   myocardial filling (Grade II diastolic dysfunction).   5. Severe left atrial enlargement with LA volume index of 64.9 ml/m2.   6. There is no evidence of pericardial effusion.   7. Moderate mitral annular calcification.   8. Thickening of the anterior and posterior mitral valve leaflets.   9. Trace tricuspid regurgitation.  10. Pulmonic valve regurgitation.  11. Estimated pulmonary artery systolic pressure is 45.9 mmHg assuming a   right atrial pressure of 8 mmHg, which is consistent with mild pulmonary   hypertension.    [id number] Tegan Montoya MD, Electronically signed on 8/9/2018 at 9:01:48   PM      Assessment and Plan:  In summary, CHRISTIANO ELIZABETH is an 85y Male with past medical history significant for CKD not yet on HD, s/p L arm AV fistula, chronic anemia, non-obstructive CAD, recent GI bleed s/p colonoscopy and polyp resection, EGD with gastritis, ventricular arrhythmia on flecainide per Dr. Zana Palma at [facility], DM, carotid disease s/p right carotid endarterectomy who presents c/o orthopnea, shortness of breath, and LE edema x 3 days. He may have forgotten to take his diuretic on occassion. He was given IV lasix 80mg in ER. Family at bedside.    1. Admit to telemetry.  2. Evidence of volume overload. Continue with IV diuresis. Per daughter (ER nurse manager), patient requires IV lasix gtt.  3. Consult renal service. Patient may require HD pending effectiveness of diuresis. However, patient has had recurrent episodes of volume overload.  4. Monitor renal function and urine output.  5. Obtain 2D echo to reevaluate EF and valves. Prior echo from 2018 showed preserved LV systolic function.

## 2019-12-18 NOTE — ED ADULT NURSE REASSESSMENT NOTE - NS ED NURSE REASSESS COMMENT FT1
Report received from luly rangel.  Cardiology at bedside for pt. evaluation.  Pt. to be transported to US in NAD.  POC explained to pt. and family.  Awaiting bed on 4 tower.  In NAD, will continue to monitor.

## 2019-12-18 NOTE — ED PROVIDER NOTE - CARE PLAN
Principal Discharge DX:	Heart failure  Secondary Diagnosis:	Acute pulmonary edema  Secondary Diagnosis:	Pleural effusion

## 2019-12-18 NOTE — H&P ADULT - HISTORY OF PRESENT ILLNESS
84 yo M w/ hx CKD 5 not on HD, anemia of chronic disease on aricept, recent GI bleed s/p colonoscopy and polyp resection, EGD with gastritis, arrhythmia on flecainide presents to ER from [facility] for orthopnea and shortness of breath.  patient endorses orthopnea and LE edema x 2-3 days. compliant with meds and salt restriction. no chest pain/fevers/cough or palpitations.   family hesitant to start HD. on torsemide 20mg daily but metolazone PRN (not really using)    in ER given 80mg IVP lasix x1 and protonix 80mg x1

## 2019-12-18 NOTE — ED PROVIDER NOTE - OBJECTIVE STATEMENT
86yo M with chronic SOB x3 months- related to CKD and GI bleed was doing well but last 3 days worsening SOB only notices at night when laying flat. denies exertional sx. denies CP. no cough. no fever/chills. no LE edema. follows with [facility], unknown last cardiac w/u. h/o arrtythmia on flecainide, on torsemide and metolazone no known CHF. denies black stool or hematochezia. had recent negative colonoscopy.

## 2019-12-18 NOTE — ED ADULT TRIAGE NOTE - CHIEF COMPLAINT QUOTE
Pt brought in by family from [facility] for eval of worsening SOB and eval for possible transfusion as per daughter. Ot seen by Dr. Baker today and told to come to ED. Pt with increasing activity intolerance and orthopnea as per daughter and pt. Pt with left av fistula, no dialysis treatments to date. Pt appears pale.

## 2019-12-18 NOTE — ED ADULT NURSE NOTE - CHIEF COMPLAINT QUOTE
Pt brought in by family from [facility] for eval of worsening SOB and eval for possible transfusion as per daughter. Ot seen by Dr. Baker today and told to come to ED. Pt with increasing activity intolerance and orthopnea as per daughter and pt. Pt with left av fistula, no dialysis treatments to date. Pt appears pale.

## 2019-12-18 NOTE — ED PROVIDER NOTE - PHYSICAL EXAMINATION
Gen: NAD, AOx3, chronically ill appearing  Head: NCAT  HEENT: EOMI, oral mucosa dry, normal conjunctiva, neck supple  Lung: CTAB, no respiratory distress  CV: rrr, no murmur, Normal perfusion  Abd: soft, NTND  Rectal: dark stool no hematochezia  MSK: No edema, no visible deformities  Neuro: No focal neurologic deficits  Skin: No rash   Psych: normal affect Gen: NAD, AOx3, chronically ill appearing  Head: NCAT  HEENT: EOMI, oral mucosa dry, normal conjunctiva, neck supple  Lung: CTAB, no respiratory distress  CV: rrr, +harsh systolic murmur, Normal perfusion  Abd: soft, NTND  Rectal: dark stool no hematochezia  MSK: No edema, no visible deformities  Neuro: No focal neurologic deficits  Skin: No rash   Psych: normal affect

## 2019-12-18 NOTE — ED PROVIDER NOTE - CLINICAL SUMMARY MEDICAL DECISION MAKING FREE TEXT BOX
patient with patient with orthopnea, denies bloody/melena. no acute distress. slight hypoxia- placed on NC, more likely cardiac in origin than related to anemia. could be combination. will check labs. cxr. BNP, cards consult. FOBt reasses TBA

## 2019-12-18 NOTE — ED ADULT NURSE NOTE - OBJECTIVE STATEMENT
Pt care assumed at 1545, presents to ED A&Ox3 c/o shortness of breath. Pt's family at bedside, reports he goes to Dr. Baker at the [facility] for Aranesp injections for his kidney failure. Pt has a left arm AV fistula. + bruit, + thrill. Pt denies any chest pain or discomfort. Denies feeling SOB at this time, pt sitting up on stretcher, respirations even and unlabored. Rec'd with #20g to right FA, intact and patent with ns flush. Lab results pending. Will continue to monitor and reassess.

## 2019-12-19 LAB
ANION GAP SERPL CALC-SCNC: 19 MMOL/L — HIGH (ref 5–17)
ANION GAP SERPL CALC-SCNC: 20 MMOL/L — HIGH (ref 5–17)
BUN SERPL-MCNC: 104 MG/DL — HIGH (ref 8–20)
BUN SERPL-MCNC: 107 MG/DL — HIGH (ref 8–20)
CALCIUM SERPL-MCNC: 9 MG/DL — SIGNIFICANT CHANGE UP (ref 8.6–10.2)
CALCIUM SERPL-MCNC: 9.4 MG/DL — SIGNIFICANT CHANGE UP (ref 8.6–10.2)
CHLORIDE SERPL-SCNC: 101 MMOL/L — SIGNIFICANT CHANGE UP (ref 98–107)
CHLORIDE SERPL-SCNC: 105 MMOL/L — SIGNIFICANT CHANGE UP (ref 98–107)
CO2 SERPL-SCNC: 14 MMOL/L — LOW (ref 22–29)
CO2 SERPL-SCNC: 15 MMOL/L — LOW (ref 22–29)
CREAT SERPL-MCNC: 5.28 MG/DL — HIGH (ref 0.5–1.3)
CREAT SERPL-MCNC: 5.67 MG/DL — HIGH (ref 0.5–1.3)
GLUCOSE SERPL-MCNC: 180 MG/DL — HIGH (ref 70–115)
GLUCOSE SERPL-MCNC: 91 MG/DL — SIGNIFICANT CHANGE UP (ref 70–115)
HCT VFR BLD CALC: 24.3 % — LOW (ref 39–50)
HCT VFR BLD CALC: 25.9 % — LOW (ref 39–50)
HGB BLD-MCNC: 7.6 G/DL — LOW (ref 13–17)
HGB BLD-MCNC: 8.1 G/DL — LOW (ref 13–17)
MCHC RBC-ENTMCNC: 29.9 PG — SIGNIFICANT CHANGE UP (ref 27–34)
MCHC RBC-ENTMCNC: 31.3 GM/DL — LOW (ref 32–36)
MCV RBC AUTO: 95.6 FL — SIGNIFICANT CHANGE UP (ref 80–100)
PLATELET # BLD AUTO: 216 K/UL — SIGNIFICANT CHANGE UP (ref 150–400)
POTASSIUM SERPL-MCNC: 4.4 MMOL/L — SIGNIFICANT CHANGE UP (ref 3.5–5.3)
POTASSIUM SERPL-MCNC: 4.7 MMOL/L — SIGNIFICANT CHANGE UP (ref 3.5–5.3)
POTASSIUM SERPL-SCNC: 4.4 MMOL/L — SIGNIFICANT CHANGE UP (ref 3.5–5.3)
POTASSIUM SERPL-SCNC: 4.7 MMOL/L — SIGNIFICANT CHANGE UP (ref 3.5–5.3)
RBC # BLD: 2.71 M/UL — LOW (ref 4.2–5.8)
RBC # FLD: 15.1 % — HIGH (ref 10.3–14.5)
SODIUM SERPL-SCNC: 135 MMOL/L — SIGNIFICANT CHANGE UP (ref 135–145)
SODIUM SERPL-SCNC: 139 MMOL/L — SIGNIFICANT CHANGE UP (ref 135–145)
WBC # BLD: 7.45 K/UL — SIGNIFICANT CHANGE UP (ref 3.8–10.5)
WBC # FLD AUTO: 7.45 K/UL — SIGNIFICANT CHANGE UP (ref 3.8–10.5)

## 2019-12-19 PROCEDURE — 99223 1ST HOSP IP/OBS HIGH 75: CPT

## 2019-12-19 PROCEDURE — 99233 SBSQ HOSP IP/OBS HIGH 50: CPT

## 2019-12-19 RX ORDER — ERYTHROPOIETIN 10000 [IU]/ML
20000 INJECTION, SOLUTION INTRAVENOUS; SUBCUTANEOUS
Refills: 0 | Status: DISCONTINUED | OUTPATIENT
Start: 2019-12-19 | End: 2019-12-22

## 2019-12-19 RX ORDER — SODIUM BICARBONATE 1 MEQ/ML
1300 SYRINGE (ML) INTRAVENOUS
Refills: 0 | Status: DISCONTINUED | OUTPATIENT
Start: 2019-12-19 | End: 2019-12-22

## 2019-12-19 RX ADMIN — PANTOPRAZOLE SODIUM 40 MILLIGRAM(S): 20 TABLET, DELAYED RELEASE ORAL at 05:59

## 2019-12-19 RX ADMIN — Medication 81 MILLIGRAM(S): at 09:39

## 2019-12-19 RX ADMIN — Medication 325 MILLIGRAM(S): at 21:49

## 2019-12-19 RX ADMIN — Medication 100 MILLIGRAM(S): at 05:59

## 2019-12-19 RX ADMIN — Medication 1 TABLET(S): at 09:39

## 2019-12-19 RX ADMIN — Medication 325 MILLIGRAM(S): at 13:17

## 2019-12-19 RX ADMIN — Medication 100 MILLIGRAM(S): at 06:00

## 2019-12-19 RX ADMIN — Medication 90 MILLIGRAM(S): at 06:00

## 2019-12-19 RX ADMIN — Medication 500 MILLIGRAM(S): at 09:39

## 2019-12-19 RX ADMIN — Medication 100 MILLIGRAM(S): at 21:49

## 2019-12-19 RX ADMIN — ERYTHROPOIETIN 20000 UNIT(S): 10000 INJECTION, SOLUTION INTRAVENOUS; SUBCUTANEOUS at 21:50

## 2019-12-19 RX ADMIN — Medication 100 MILLIGRAM(S): at 13:17

## 2019-12-19 RX ADMIN — Medication 325 MILLIGRAM(S): at 05:59

## 2019-12-19 RX ADMIN — Medication 60 MILLIGRAM(S): at 21:49

## 2019-12-19 RX ADMIN — Medication 1300 MILLIGRAM(S): at 18:02

## 2019-12-19 RX ADMIN — ATORVASTATIN CALCIUM 40 MILLIGRAM(S): 80 TABLET, FILM COATED ORAL at 21:49

## 2019-12-19 RX ADMIN — CALCITRIOL 0.25 MICROGRAM(S): 0.5 CAPSULE ORAL at 09:39

## 2019-12-19 NOTE — PROGRESS NOTE ADULT - SUBJECTIVE AND OBJECTIVE BOX
CHIEF COMPLAINT/INTERVAL HISTORY:    Patient is a 85y old  Male who presents with a chief complaint of shortness of breath (19 Dec 2019 15:00)    SUBJECTIVE & OBJECTIVE: Pt seen and examined at bedside. No overnight events. Patient reports feeling less dyspneic today. Urine Output 1.35 liters in the past 24/hrs. Continue lasix gtt and add metolazone. Monitor renal function and Hb. Flecainide held her cardio; continue telemonitoring.     ROS: No chest pain, palpitations, light headedness, dizziness, headache, nausea/vomiting, fevers/chills, abdominal pain, dysuria or increased urinary frequency.    ICU Vital Signs Last 24 Hrs  T(C): 36.8 (19 Dec 2019 15:15), Max: 37 (19 Dec 2019 05:51)  T(F): 98.3 (19 Dec 2019 15:15), Max: 98.6 (19 Dec 2019 05:51)  HR: 68 (19 Dec 2019 15:15) (64 - 72)  BP: 156/62 (19 Dec 2019 18:55) (122/52 - 178/69)  RR: 20 (19 Dec 2019 15:15) (18 - 20)  SpO2: 91% (19 Dec 2019 15:15) (91% - 97%)    MEDICATIONS  (STANDING):  ascorbic acid 500 milliGRAM(s) Oral daily  aspirin enteric coated 81 milliGRAM(s) Oral daily  atorvastatin 40 milliGRAM(s) Oral at bedtime  calcitriol   Capsule 0.25 MICROGram(s) Oral daily  epoetin juan Injectable 22719 Unit(s) SubCutaneous every 7 days  ferrous    sulfate 325 milliGRAM(s) Oral three times a day  furosemide Infusion 10 mG/Hr (5 mL/Hr) IV Continuous <Continuous>  hydrALAZINE 100 milliGRAM(s) Oral every 8 hours  metolazone 5 milliGRAM(s) Oral daily  Nephro-pito 1 Tablet(s) Oral daily  NIFEdipine XL 90 milliGRAM(s) Oral daily  NIFEdipine XL 60 milliGRAM(s) Oral at bedtime  pantoprazole    Tablet 40 milliGRAM(s) Oral before breakfast  sodium bicarbonate 1300 milliGRAM(s) Oral two times a day    MEDICATIONS  (PRN):      LABS:                        7.6    x     )-----------( x        ( 19 Dec 2019 17:10 )             24.3     12-19    135  |  101  |  107.0<H>  ----------------------------<  180<H>  4.7   |  14.0<L>  |  5.67<H>    Ca    9.0      19 Dec 2019 17:10    TPro  7.1  /  Alb  4.2  /  TBili  0.4  /  DBili  x   /  AST  23  /  ALT  31  /  AlkPhos  105  12-18    PT/INR - ( 18 Dec 2019 16:26 )   PT: 12.3 sec;   INR: 1.07 ratio         PTT - ( 18 Dec 2019 16:26 )  PTT:37.8 sec      CAPILLARY BLOOD GLUCOSE      PHYSICAL EXAM:    GENERAL: elderly male, chronically ill appearing, not in acute distress  HEAD:  Atraumatic, Normocephalic  EYES: EOMI, PERRLA, conjunctiva and sclera clear  ENMT: Moist mucous membranes  NECK: Supple  NERVOUS SYSTEM:  Alert & Oriented X3  CHEST/LUNG: Clear to auscultation bilaterally; No rales, rhonchi, wheezing, or rubs  HEART: Regular rate and rhythm; + S1/S2, + murmur  ABDOMEN: Soft, Nontender, Nondistended; Bowel sounds present  EXTREMITIES:  ++ LE edema (left > right)

## 2019-12-19 NOTE — CONSULT NOTE ADULT - ASSESSMENT
85y old Male with a past medical history significant for CKD 5 not on HD, anemia of chronic disease, arrhythmia on flecainide presented to ER from [facility] for orthopnea and shortness of breath thought to be secondary to his poor renal function. GI was consulted for chronic anemia.  No overt bleeding. Fecal occult + stool is not clinically significant in the this setting. He had an endoscopic work up last month.   - iron studies   - monitor cbc    Please call with questions. Thanks

## 2019-12-19 NOTE — CONSULT NOTE ADULT - SUBJECTIVE AND OBJECTIVE BOX
[facility] DIVISION OF KIDNEY DISEASES AND HYPERTENSION -- INITIAL CONSULT NOTE  --------------------------------------------------------------------------------  HPI:  85 year old male pt with  hx of  CKD 5 not on HD, anemia of chronic disease on Aranesp, recent GI bleed s/p colonoscopy and polyp resection, EGD with gastritis, arrhythmia on flecainide presents to ER from [facility] for orthopnea, shortness of breath, leg edema and decreased urination. Pt is on torsemide 20mg daily and metolazone prn (which he hasnt used recently). Pt admitted for volume overload. S/p Lasix 80 mg IV in the ED; now on lasix gtt.  Pt known to Nephrology service; he follows with Dr. Avendano. He has a LUE AVF which was created last year; family has been hesitant to start dialysis.     PAST HISTORY  --------------------------------------------------------------------------------  PAST MEDICAL & SURGICAL HISTORY:  Risk factors for obstructive sleep apnea  Anemia  RICHARDS (dyspnea on exertion)  VT (ventricular tachycardia)  HTN (hypertension)  CAD (coronary artery disease)  CKD (chronic kidney disease): stage IV  Arrhythmia  AV block, 1st degree  DM (diabetes mellitus)  H/O carotid endarterectomy: Right  A-V fistula: left arm 5/2017  H/O angioplasty: 2013,  no  intervention  H/O left knee surgery  H/O circumcision: at  age  65    FAMILY HISTORY:  Family history of premature CAD  Family history of lung cancer  Family history of cancer    PAST SOCIAL HISTORY:    ALLERGIES & MEDICATIONS  --------------------------------------------------------------------------------  Allergies    Plavix (Hives)  Toprol-XL (Rash)    Intolerances      Standing Inpatient Medications  ascorbic acid 500 milliGRAM(s) Oral daily  aspirin enteric coated 81 milliGRAM(s) Oral daily  atorvastatin 40 milliGRAM(s) Oral at bedtime  calcitriol   Capsule 0.25 MICROGram(s) Oral daily  ferrous    sulfate 325 milliGRAM(s) Oral three times a day  furosemide Infusion 10 mG/Hr IV Continuous <Continuous>  hydrALAZINE 100 milliGRAM(s) Oral every 8 hours  Nephro-pito 1 Tablet(s) Oral daily  NIFEdipine XL 90 milliGRAM(s) Oral daily  NIFEdipine XL 60 milliGRAM(s) Oral at bedtime  pantoprazole    Tablet 40 milliGRAM(s) Oral before breakfast  sodium bicarbonate 1300 milliGRAM(s) Oral two times a day    PRN Inpatient Medications      REVIEW OF SYSTEMS  --------------------------------------------------------------------------------  Gen: weight gain (5 lbs) , fatigue+  Skin: No rashes  Head/Eyes/Ears/Mouth: No headache; Normal hearing; Normal vision w/o blurriness  Respiratory: dyspnea+ , No cough, wheezing, hemoptysis  CV: No chest pain, PND, orthopnea+  GI: No abdominal pain, diarrhea, constipation, nausea, vomiting, melena, hematochezia  : decreased urination+  MSK: No joint pain/swelling; no back pain; leg edema+  Neuro: No dizziness/lightheadedness  Heme: No easy bruising or bleeding  Endo: No heat/cold intolerance  Psych: No significant nervousness, anxiety, stress, depression    All other systems were reviewed and are negative, except as noted.    VITALS/PHYSICAL EXAM  --------------------------------------------------------------------------------  T(C): 36.6 (12-19-19 @ 10:01), Max: 37 (12-19-19 @ 05:51)  HR: 67 (12-19-19 @ 13:14) (64 - 75)  BP: 132/56 (12-19-19 @ 13:14) (122/52 - 178/69)  RR: 20 (12-19-19 @ 13:14) (18 - 26)  SpO2: 96% (12-19-19 @ 13:14) (91% - 97%)  Wt(kg): --  Height (cm): 165.1 (12-18-19 @ 15:35)  Weight (kg): 69.1 (12-19-19 @ 12:05)  BMI (kg/m2): 25.4 (12-19-19 @ 12:05)  BSA (m2): 1.76 (12-19-19 @ 12:05)      12-18-19 @ 07:01  -  12-19-19 @ 07:00  --------------------------------------------------------  IN: 240 mL / OUT: 1350 mL / NET: -1110 mL    12-19-19 @ 07:01  -  12-19-19 @ 15:00  --------------------------------------------------------  IN: 300 mL / OUT: 300 mL / NET: 0 mL      Physical Exam:  	Gen: elderly man, pale  	HEENT: on supplemental O2 via NC  	Pulm: b/l crackles+  	CV: RRR, S1S2; no rub  	Back: No spinal or CVA tenderness  	Abd: +BS, soft, nontender/nondistended  	: No suprapubic tenderness  	UE: Warm,  no edema; no asterixis  	LE: Warm, pitting edema upto mid shins+  	Neuro: No focal deficits  	Psych: Normal affect and mood  	Skin: Warm, without rashes  	Vascular access: LUE AVF, thrill+ bruit+    LABS/STUDIES  --------------------------------------------------------------------------------              8.1    7.45  >-----------<  216      [12-19-19 @ 06:55]              25.9     139  |  105  |  104.0  ----------------------------<  91      [12-19-19 @ 06:55]  4.4   |  15.0  |  5.28        Ca     9.4     [12-19-19 @ 06:55]    TPro  7.1  /  Alb  4.2  /  TBili  0.4  /  DBili  x   /  AST  23  /  ALT  31  /  AlkPhos  105  [12-18-19 @ 16:26]    PT/INR: PT 12.3 , INR 1.07       [12-18-19 @ 16:26]  PTT: 37.8       [12-18-19 @ 16:26]    Troponin 0.06      [12-18-19 @ 16:26]    Creatinine Trend:  SCr 5.28 [12-19 @ 06:55]  SCr 5.30 [12-18 @ 16:26]    Urinalysis - [02-01-18 @ 20:53]      Color Yellow / Appearance Clear / SG 1.015 / pH 6.0      Gluc Negative / Ketone Negative  / Bili Negative / Urobili Negative       Blood Trace / Protein 100 / Leuk Est Negative / Nitrite Negative      RBC 0-2 / WBC 0-2 / Hyaline  / Gran  / Sq Epi  / Non Sq Epi Rare / Bacteria       HbA1c 4.9      [08-09-18 @ 05:54]    HBsAb <3.0      [08-09-18 @ 18:29]  HBsAg Nonreact      [08-09-18 @ 18:29]  HBcAb Nonreact      [08-09-18 @ 18:29]  HCV 0.09, Nonreact      [08-09-18 @ 18:29]

## 2019-12-19 NOTE — PHYSICAL THERAPY INITIAL EVALUATION ADULT - ADDITIONAL COMMENTS
Per patient he does not use or own an AD. He ambulates without an AD but has fallen 5 times this year. Pt has 3 steps to enter with a single hand rail. He lives with his wife who will be limited in ability to assist.

## 2019-12-19 NOTE — PROGRESS NOTE ADULT - ASSESSMENT
85 year old male with PMH of CKD 5 not on HD, anemia of chronic disease on aricept, recent GI bleed s/p colonoscopy with two ulcerated polyps s/p resections and colonic AVMs, EGD with gastritis, VPCs who presents to ER from [facility] for orthopnea and shortness of breath.     Volume overload in the setting of advanced renal disease and diastolic dysfunction  -BNP > 11,000  -continue lasix gtt and add metolazone for synergistic effect  -obtain TTE  -strict I/Os, daily weights (urine output 1.34 liters/24 hours)  -low sodium diet, fluid restriction  -if patient fails diuretic challenge, may require ultrafiltration if patient & family are agreeable  -cardiology and renal recommendations appreciated     CKD Stage 5  -baseline creatinine 4.7  -must accept degree of azotemia while diuresing   -UA reviewed  -continue lasix gtt + metolazone  -continue nephrovite  -check calcium and phosphorus; continue calcitriol  -AGMA due to uremia; check sodium bicarb 1300 mg BID and titrate upwards  -strict I/Os, daily weights  -no urgent indication for renal replacement therapy at this time  -renal consult appreciated    Acute on Chronic Anemia of Chronic Disease  -occult blood positive  -GI consulted; recent EGD/Colonoscopy  -patient with gastritis, ulcerated polyps (s/p resection) and colonic AVMs  -repeat H/H trending down to 7.6  -no overt signs/symptoms of bleeding  -start AMY 20,000 units weekly; one dose now  -check iron studies to assess for IV iron; for now continue ferrous sulfate  -monitor H/H closely and transfuse if Hb < 7.0  -GI consult appreciated; no acute intervention required. No active evidence of bleeding.    Valvular Heart Disease  -check TTE to assess valves  -cardiology recommendations appreciated    VPCs  -hold flecainide per cardiology  -continue close telemonitoring    HTN  -BP continue; continue hydralazine, nifedipine, lasix, metolazone  -low sodium diet    DVT ppx - SCDs    PT - home PT upon discharge

## 2019-12-19 NOTE — PROGRESS NOTE ADULT - ASSESSMENT
1. 86 yo male presents with dyspnea/edema/orthopnea-volume overload largely due to advanced renal disease. Pt and family have repeatedly declined HD altho pt has a dialysis fistula. On IV lasix. Trend GFR/creatinine  2. Preserved systolic LV -likely diastolic dysfunction  3. Aortic stenosis-mild according to current echo-will review/functional MS due to heavy MAC.  4. Pulmonary hpt-moderate.  5. chronic anemia  6. Hx of frequent symptomatic VPC's controlled w flecainide-currently on hold.

## 2019-12-19 NOTE — CONSULT NOTE ADULT - SUBJECTIVE AND OBJECTIVE BOX
HISTORY OF PRESENT ILLNESS:  This is a 85y old Male with a past medical history significant for CKD 5 not on HD, anemia of chronic disease, arrhythmia on flecainide presented to ER from [facility] for orthopnea and shortness of breath. Family hesitant to start HD. Patient has been on diuretics. He has chronic anemia and was recently admitted. He denies melena or hematochezia. He had a recent EGD with gastritis and colonoscopy with polypectomy of several polyps and treatment of AVMs. GI was consulted for anemia and fecal occult + stool.       REVIEW OF SYSTEMS:  Constitutional:  No unintentional weight loss, fevers, chills or night sweats	  Eyes: No eye pain, redness, discharge, or proptosis  ENMT: No sore throat, ear pain, mouth sores, or swollen glands in the neck  Respiratory: No dyspnea, cough or wheezing  Cardiovascular: No chest pain, dyspnea on exertion, or orthopnea  Gastrointestinal:	Please see HPI  Genitourinary: No dysuria or hematuria  Neurological:	 No changes in sleep/wake cycle, convulsions, confusion, dizziness or lightheadedness  Psychiatric: No changes in personality or emotional problems   Hematology: No easy bruising   Endocrine: No hot or cold flashes or deepening of voice	  All other review of systems were completed and were otherwise negative save what is reported in the HPI.    PAST MEDICAL/SURGICAL HISTORY:  Risk factors for obstructive sleep apnea  Anemia  RICHARDS (dyspnea on exertion)  VT (ventricular tachycardia)  HTN (hypertension)  CAD (coronary artery disease)  CKD (chronic kidney disease): stage IV  Arrhythmia  AV block, 1st degree  DM (diabetes mellitus)  H/O carotid endarterectomy: Right  A-V fistula: left arm 5/2017  H/O angioplasty: 2013,  no  intervention  H/O left knee surgery  H/O circumcision: at  age  65    SOCIAL HISTORY:  - ILLICIT DRUG USE: Denies    FAMILY HISTORY:  No known history of gastrointestinal or liver disease;  Family history of premature CAD  Family history of lung cancer  Family history of cancer      HOME MEDICATIONS:  atorvastatin 40 mg oral tablet: 1 tab(s) orally once a day (at bedtime) (07 Nov 2019 18:16)  calcitriol 0.25 mcg oral capsule: 1 cap(s) orally once a day (07 Nov 2019 18:16)  ferrous sulfate 325 mg (65 mg elemental iron) oral delayed release tablet: 1 tab(s) orally 3 times a day (18 Dec 2019 18:53)  flecainide 100 mg oral tablet: 1 tab(s) orally every 12 hours (07 Nov 2019 18:16)  hydrALAZINE 100 mg oral tablet: 1 tab(s) orally 3 times a day (18 Dec 2019 18:53)  metOLazone 5 mg oral tablet: 1 tab(s) orally once a day, As Needed (07 Nov 2019 19:21)  Nephro-Pito oral tablet: 1 tab(s) orally once a day (07 Nov 2019 18:16)  NIFEdipine 60 mg oral tablet, extended release: 1 tab(s) orally once a day (at bedtime) (18 Dec 2019 18:53)  NIFEdipine 90 mg oral tablet, extended release: 1 tab(s) orally once a day (12 Nov 2019 16:00)  torsemide 20 mg oral tablet: 1 tab(s) orally once a day (07 Nov 2019 18:16)  Vitamin C 250 mg oral tablet: 1 tab(s) orally once a day (07 Nov 2019 18:16)  Vitamin D3 50,000 intl units oral capsule: 1 cap(s) orally once a week (18 Dec 2019 18:53)    INPATIENT MEDICATIONS:  MEDICATIONS  (STANDING):  ascorbic acid 500 milliGRAM(s) Oral daily  aspirin enteric coated 81 milliGRAM(s) Oral daily  atorvastatin 40 milliGRAM(s) Oral at bedtime  calcitriol   Capsule 0.25 MICROGram(s) Oral daily  ferrous    sulfate 325 milliGRAM(s) Oral three times a day  furosemide Infusion 10 mG/Hr (5 mL/Hr) IV Continuous <Continuous>  hydrALAZINE 100 milliGRAM(s) Oral every 8 hours  Nephro-pito 1 Tablet(s) Oral daily  NIFEdipine XL 90 milliGRAM(s) Oral daily  NIFEdipine XL 60 milliGRAM(s) Oral at bedtime  pantoprazole    Tablet 40 milliGRAM(s) Oral before breakfast  sodium bicarbonate 1300 milliGRAM(s) Oral two times a day    MEDICATIONS  (PRN):    ALLERGIES:  Plavix (Hives)  Toprol-XL (Rash)    VITAL SIGNS LAST 24 HOURS:  T(C): 36.6 (19 Dec 2019 10:01), Max: 37 (19 Dec 2019 05:51)  T(F): 97.8 (19 Dec 2019 10:01), Max: 98.6 (19 Dec 2019 05:51)  HR: 64 (19 Dec 2019 10:01) (64 - 75)  BP: 122/52 (19 Dec 2019 10:01) (122/52 - 178/69)  BP(mean): 111 (18 Dec 2019 15:30) (111 - 111)  RR: 18 (19 Dec 2019 10:01) (18 - 26)  SpO2: 96% (19 Dec 2019 10:01) (91% - 97%)      12-18-19 @ 07:01  -  12-19-19 @ 07:00  --------------------------------------------------------  IN: 240 mL / OUT: 1350 mL / NET: -1110 mL    12-19-19 @ 07:01  -  12-19-19 @ 12:19  --------------------------------------------------------  IN: 60 mL / OUT: 100 mL / NET: -40 mL        12-18-19 @ 07:01  -  12-19-19 @ 07:00  --------------------------------------------------------  IN: 240 mL / OUT: 1350 mL / NET: -1110 mL    12-19-19 @ 07:01  -  12-19-19 @ 12:19  --------------------------------------------------------  IN: 60 mL / OUT: 100 mL / NET: -40 mL      PHYSICAL EXAM:  Constitutional: Well-developed, well-nourished, in no apparent distress  Eyes: Sclerae anicteric, conjunctivae normal  ENMT: Mucus membranes moist, no oropharyngeal thrush noted  Neck: No thyroid nodules appreciated, no significant cervical or supraclavicular lymphadenopathy  Respiratory: Breathing nonlabored  Gastrointestinal: Soft, nontender, nondistended, normoactive bowel sounds  Extremities: b/l 1 +edema  Neurological: Alert and oriented to person, place and time; no asterixis  Skin: No jaundice  Lymph Nodes: No significant lymphadenopathy  Musculoskeletal: No significant peripheral atrophy  Psychiatric: Affect and mood appropriate      LABS:                        8.1    7.45  )-----------( 216      ( 19 Dec 2019 06:55 )             25.9     PT/INR - ( 18 Dec 2019 16:26 )   PT: 12.3 sec;   INR: 1.07 ratio         PTT - ( 18 Dec 2019 16:26 )  PTT:37.8 sec  12-19    139  |  105  |  104.0<H>  ----------------------------<  91  4.4   |  15.0<L>  |  5.28<H>    Ca    9.4      19 Dec 2019 06:55    TPro  7.1  /  Alb  4.2  /  TBili  0.4  /  DBili  x   /  AST  23  /  ALT  31  /  AlkPhos  105  12-18    LIVER FUNCTIONS - ( 18 Dec 2019 16:26 )  Alb: 4.2 g/dL / Pro: 7.1 g/dL / ALK PHOS: 105 U/L / ALT: 31 U/L / AST: 23 U/L / GGT: x           IMAGING:  < from: Xray Chest 1 View- PORTABLE-Urgent (12.18.19 @ 16:40) >  FINDINGS:   The lungs  show  Bilateral lung perihilar and basilar interstitial opacities  and/or mild pleural effusions. There is mild vascular congestion.  The  heart is enlarged in transverse diameter. No hilar mass. Trachea midline.  Visualized osseous structures are intact.  IMPRESSION:   Cardiomegaly with the early interstitial opacities/infiltrates RIGHT greater than LEFT and small bilateral effusions concerning for  pulmonary edema and effusion of cardiac origin..    < end of copied text >  < from: US Kidney and Bladder (06.03.15 @ 09:54) >  FINDINGS:  RIGHT KIDNEY:  Normal in size, shape, and configuration measuring 9.3 x   5.3 x 5.4 cm.  No cystic or solid masses.  No calculi. Thinning of the   renal cortex is noted. There are 3 small cysts demonstrated in the   midpole and lower pole each measuring approximately 1.3 cm.  LEFT KIDNEY:  Normal in size, shape, and configuration measuring 9.8 x   6.0 x 5.7 cm. No cystic or solid masses.  No calculi. Thinning of the   cortex is noted. There are 2 small cysts identified in the midpole and   lower pole measuring approximately 0.9 and 1.5 cm.    There was no evidence of obstruction in either kidney.    The bladder was not distended. The bladder emptied completely on postvoid   imaging.    IMPRESSION: No evidence of hydronephrosis. Thinning of the cortex   bilaterally. Bilateral renal cysts. Allowing for differences in modality   and technique, no definite change from the prior CT scan of 4/18/2011.    < end of copied text >

## 2019-12-19 NOTE — CONSULT NOTE ADULT - ASSESSMENT
Pt with CKD stage V, admitted for volume overload    1) Hypervolemia in setting of advanced CKD   2)CKD stage V- not on dialysis  3) Anemia of chronic disease  4) HTN  5) Metabolic acidosis      Continue lasix gtt; add Metolazone 5 mg qd  Spoke to pt and family members about dialysis again; they are reluctant and would like to try medical management first  Hb low; continue PO iron and AMY  Bp stable; continue antihypertensive regimen  Continue sodium bicarb po ; monitor serum Co2 levels  will follow.

## 2019-12-20 LAB
ANION GAP SERPL CALC-SCNC: 21 MMOL/L — HIGH (ref 5–17)
BASOPHILS # BLD AUTO: 0.08 K/UL — SIGNIFICANT CHANGE UP (ref 0–0.2)
BASOPHILS NFR BLD AUTO: 1.1 % — SIGNIFICANT CHANGE UP (ref 0–2)
BUN SERPL-MCNC: 110 MG/DL — HIGH (ref 8–20)
CALCIUM SERPL-MCNC: 9.3 MG/DL — SIGNIFICANT CHANGE UP (ref 8.6–10.2)
CHLORIDE SERPL-SCNC: 102 MMOL/L — SIGNIFICANT CHANGE UP (ref 98–107)
CO2 SERPL-SCNC: 16 MMOL/L — LOW (ref 22–29)
CREAT SERPL-MCNC: 5.76 MG/DL — HIGH (ref 0.5–1.3)
EOSINOPHIL # BLD AUTO: 0.2 K/UL — SIGNIFICANT CHANGE UP (ref 0–0.5)
EOSINOPHIL NFR BLD AUTO: 2.7 % — SIGNIFICANT CHANGE UP (ref 0–6)
FERRITIN SERPL-MCNC: 247 NG/ML — SIGNIFICANT CHANGE UP (ref 30–400)
GLUCOSE SERPL-MCNC: 97 MG/DL — SIGNIFICANT CHANGE UP (ref 70–115)
HCT VFR BLD CALC: 25.6 % — LOW (ref 39–50)
HGB BLD-MCNC: 8 G/DL — LOW (ref 13–17)
IMM GRANULOCYTES NFR BLD AUTO: 0.8 % — SIGNIFICANT CHANGE UP (ref 0–1.5)
IRON SATN MFR SERPL: 28 % — SIGNIFICANT CHANGE UP (ref 16–55)
IRON SATN MFR SERPL: 67 UG/DL — SIGNIFICANT CHANGE UP (ref 59–158)
LYMPHOCYTES # BLD AUTO: 0.95 K/UL — LOW (ref 1–3.3)
LYMPHOCYTES # BLD AUTO: 12.6 % — LOW (ref 13–44)
MAGNESIUM SERPL-MCNC: 2.1 MG/DL — SIGNIFICANT CHANGE UP (ref 1.6–2.6)
MCHC RBC-ENTMCNC: 29.4 PG — SIGNIFICANT CHANGE UP (ref 27–34)
MCHC RBC-ENTMCNC: 31.3 GM/DL — LOW (ref 32–36)
MCV RBC AUTO: 94.1 FL — SIGNIFICANT CHANGE UP (ref 80–100)
MONOCYTES # BLD AUTO: 0.69 K/UL — SIGNIFICANT CHANGE UP (ref 0–0.9)
MONOCYTES NFR BLD AUTO: 9.2 % — SIGNIFICANT CHANGE UP (ref 2–14)
NEUTROPHILS # BLD AUTO: 5.53 K/UL — SIGNIFICANT CHANGE UP (ref 1.8–7.4)
NEUTROPHILS NFR BLD AUTO: 73.6 % — SIGNIFICANT CHANGE UP (ref 43–77)
PHOSPHATE SERPL-MCNC: 4.5 MG/DL — SIGNIFICANT CHANGE UP (ref 2.4–4.7)
PLATELET # BLD AUTO: 215 K/UL — SIGNIFICANT CHANGE UP (ref 150–400)
POTASSIUM SERPL-MCNC: 3.8 MMOL/L — SIGNIFICANT CHANGE UP (ref 3.5–5.3)
POTASSIUM SERPL-SCNC: 3.8 MMOL/L — SIGNIFICANT CHANGE UP (ref 3.5–5.3)
RBC # BLD: 2.72 M/UL — LOW (ref 4.2–5.8)
RBC # FLD: 14.9 % — HIGH (ref 10.3–14.5)
SODIUM SERPL-SCNC: 139 MMOL/L — SIGNIFICANT CHANGE UP (ref 135–145)
TIBC SERPL-MCNC: 240 UG/DL — SIGNIFICANT CHANGE UP (ref 220–430)
TRANSFERRIN SERPL-MCNC: 168 MG/DL — LOW (ref 180–329)
WBC # BLD: 7.51 K/UL — SIGNIFICANT CHANGE UP (ref 3.8–10.5)
WBC # FLD AUTO: 7.51 K/UL — SIGNIFICANT CHANGE UP (ref 3.8–10.5)

## 2019-12-20 PROCEDURE — 99232 SBSQ HOSP IP/OBS MODERATE 35: CPT

## 2019-12-20 PROCEDURE — 99233 SBSQ HOSP IP/OBS HIGH 50: CPT

## 2019-12-20 RX ORDER — FUROSEMIDE 40 MG
40 TABLET ORAL ONCE
Refills: 0 | Status: COMPLETED | OUTPATIENT
Start: 2019-12-20 | End: 2019-12-20

## 2019-12-20 RX ORDER — IRON SUCROSE 20 MG/ML
200 INJECTION, SOLUTION INTRAVENOUS EVERY 24 HOURS
Refills: 0 | Status: DISCONTINUED | OUTPATIENT
Start: 2019-12-21 | End: 2019-12-22

## 2019-12-20 RX ORDER — IRON SUCROSE 20 MG/ML
200 INJECTION, SOLUTION INTRAVENOUS EVERY 24 HOURS
Refills: 0 | Status: DISCONTINUED | OUTPATIENT
Start: 2019-12-20 | End: 2019-12-20

## 2019-12-20 RX ADMIN — CALCITRIOL 0.25 MICROGRAM(S): 0.5 CAPSULE ORAL at 11:12

## 2019-12-20 RX ADMIN — Medication 100 MILLIGRAM(S): at 14:22

## 2019-12-20 RX ADMIN — Medication 0.1 MILLIGRAM(S): at 17:12

## 2019-12-20 RX ADMIN — Medication 100 MILLIGRAM(S): at 21:27

## 2019-12-20 RX ADMIN — Medication 90 MILLIGRAM(S): at 06:24

## 2019-12-20 RX ADMIN — PANTOPRAZOLE SODIUM 40 MILLIGRAM(S): 20 TABLET, DELAYED RELEASE ORAL at 05:34

## 2019-12-20 RX ADMIN — Medication 81 MILLIGRAM(S): at 11:12

## 2019-12-20 RX ADMIN — Medication 500 MILLIGRAM(S): at 11:12

## 2019-12-20 RX ADMIN — Medication 325 MILLIGRAM(S): at 05:34

## 2019-12-20 RX ADMIN — Medication 1 TABLET(S): at 11:12

## 2019-12-20 RX ADMIN — Medication 100 MILLIGRAM(S): at 05:33

## 2019-12-20 RX ADMIN — Medication 1300 MILLIGRAM(S): at 17:11

## 2019-12-20 RX ADMIN — Medication 40 MILLIGRAM(S): at 14:22

## 2019-12-20 RX ADMIN — Medication 60 MILLIGRAM(S): at 21:27

## 2019-12-20 RX ADMIN — Medication 0.1 MILLIGRAM(S): at 11:17

## 2019-12-20 RX ADMIN — Medication 1300 MILLIGRAM(S): at 06:25

## 2019-12-20 RX ADMIN — ATORVASTATIN CALCIUM 40 MILLIGRAM(S): 80 TABLET, FILM COATED ORAL at 21:27

## 2019-12-20 NOTE — PROGRESS NOTE ADULT - SUBJECTIVE AND OBJECTIVE BOX
Patient seen and examined; chart reviewed.  No overt bleeding.  CHF being addressed by cardiology.    Had EGD and colonoscopy 1 month ago:  Gastritis and Multiple small Tubular adenomas removed.    No current rectal bleeding.  Passed brown BM yesterday.  Denies abdominal pain.  No transfusions this admission.      PAST MEDICAL & SURGICAL HISTORY:  Risk factors for obstructive sleep apnea  Anemia  RICHARDS (dyspnea on exertion)  VT (ventricular tachycardia)  HTN (hypertension)  CAD (coronary artery disease)  CKD (chronic kidney disease): stage IV  Arrhythmia  AV block, 1st degree  DM (diabetes mellitus)  H/O carotid endarterectomy: Right  A-V fistula: left arm 5/2017  H/O angioplasty: 2013,  no  intervention  H/O left knee surgery  H/O circumcision: at  age  65      ROS:  No Heartburn, regurgitation, dysphagia, odynophagia.  No dyspepsia  No abdominal pain.    No Nausea, vomiting.  No Bleeding.  No hematemesis.   No diarrhea.    No hematochesia.  No weight loss, anorexia.  No edema.      MEDICATIONS  (STANDING):  ascorbic acid 500 milliGRAM(s) Oral daily  aspirin enteric coated 81 milliGRAM(s) Oral daily  atorvastatin 40 milliGRAM(s) Oral at bedtime  calcitriol   Capsule 0.25 MICROGram(s) Oral daily  epoetin juan Injectable 88608 Unit(s) SubCutaneous every 7 days  ferrous    sulfate 325 milliGRAM(s) Oral three times a day  furosemide Infusion 10 mG/Hr (5 mL/Hr) IV Continuous <Continuous>  hydrALAZINE 100 milliGRAM(s) Oral every 8 hours  iron sucrose IVPB 200 milliGRAM(s) IV Intermittent every 24 hours  metolazone 5 milliGRAM(s) Oral daily  Nephro-pito 1 Tablet(s) Oral daily  NIFEdipine XL 90 milliGRAM(s) Oral daily  NIFEdipine XL 60 milliGRAM(s) Oral at bedtime  pantoprazole    Tablet 40 milliGRAM(s) Oral before breakfast  sodium bicarbonate 1300 milliGRAM(s) Oral two times a day    MEDICATIONS  (PRN):      Allergies    Plavix (Hives)  Toprol-XL (Rash)    Intolerances        Vital Signs Last 24 Hrs  T(C): 36.9 (20 Dec 2019 05:26), Max: 36.9 (20 Dec 2019 05:26)  T(F): 98.4 (20 Dec 2019 05:26), Max: 98.4 (20 Dec 2019 05:26)  HR: 71 (20 Dec 2019 05:26) (64 - 71)  BP: 175/74 (20 Dec 2019 05:26) (122/52 - 175/74)  BP(mean): --  RR: 18 (20 Dec 2019 05:26) (18 - 20)  SpO2: 97% (20 Dec 2019 05:26) (91% - 97%)    PHYSICAL EXAM:    GENERAL: NAD, well-groomed, well-developed  HEAD:  Atraumatic, Normocephalic  EYES: EOMI, PERRLA, conjunctiva and sclera clear  ENMT: No tonsillar erythema, exudates, or enlargement; Moist mucous membranes, Good dentition, No lesions  NECK: Supple, No JVD, Normal thyroid  CHEST/LUNG: Clear to percussion bilaterally; No rales, rhonchi, wheezing, or rubs  HEART: Regular rate and rhythm; No murmurs, rubs, or gallops  ABDOMEN: Soft, Nontender, Nondistended; Bowel sounds present  EXTREMITIES:  2+ Peripheral Pulses, No clubbing, cyanosis, or edema  LYMPH: No lymphadenopathy noted  SKIN: No rashes or lesions      LABS:                        8.0    7.51  )-----------( 215      ( 20 Dec 2019 06:35 )             25.6     12-20    139  |  102  |  110.0<H>  ----------------------------<  97  3.8   |  16.0<L>  |  5.76<H>    Ca    9.3      20 Dec 2019 06:35  Phos  4.5     12-20  Mg     2.1     12-20    TPro  7.1  /  Alb  4.2  /  TBili  0.4  /  DBili  x   /  AST  23  /  ALT  31  /  AlkPhos  105  12-18    PT/INR - ( 18 Dec 2019 16:26 )   PT: 12.3 sec;   INR: 1.07 ratio         PTT - ( 18 Dec 2019 16:26 )  PTT:37.8 sec         RADIOLOGY & ADDITIONAL STUDIES:

## 2019-12-20 NOTE — PROGRESS NOTE ADULT - ASSESSMENT
Pt with CKD stage V, admitted for volume overload    1) Hypervolemia in setting of advanced CKD   2)CKD stage V- not on dialysis  3) Anemia of chronic disease  4) HTN  5) Metabolic acidosis      Pt non oliguric; UOP 2.0 L over 24 hours  Volume status improved; labs with worsening azotemia  Pt with uremic symptoms; family however reluctant to start dialysis  Hb low; continue iron and AMY  Bp stable; continue antihypertensive regimen  Continue sodium bicarb po ; monitor serum Co2 levels  will follow.

## 2019-12-20 NOTE — PROGRESS NOTE ADULT - ASSESSMENT
85 year old male with PMH of CKD 5 not on HD, anemia of chronic disease on aricept, recent GI bleed s/p colonoscopy with two ulcerated polyps s/p resections and colonic AVMs, EGD with gastritis, VPCs who presents to ER from [facility] for orthopnea and shortness of breath.     Volume overload in the setting of advanced renal disease and acute on chronic diastolic heart failure  -BNP > 11,000  -appears euvolemic today; titrated off supplemental O2  -d/c lasix gtt and hold metolazone  -one dose of IV lasix post PRBC transfusion and then transition to torsemide 20 mg BID on 12/21  -TTE reviewed  -strict I/Os, daily weights (urine output 2 liters/24 hours)  -low sodium diet, fluid restriction  -cardiology and renal recommendations appreciated     CKD Stage 5  -baseline creatinine 4.7  -creatinine trending up with dialysis; subtle signs of uremia given lethargy  -UA reviewed  -hold lasix gtt and metolazone today  -transition to PO torsemide in AM and reassess utility of metolazone  -continue nephrovite  -calcium and phosphorus WNL; continue calcitriol  -AGMA due to uremia; continue sodium bicarb 1300 mg BID and titrate upwards as needed  -strict I/Os, daily weights  -no urgent indication for renal replacement therapy at this time  -renal consult appreciated    Acute on Chronic Anemia of Chronic Disease  -occult blood positive  -GI consulted; recent EGD/Colonoscopy  -patient with gastritis, ulcerated polyps (s/p resection) and colonic AVMs  -repeat Hb 8.0; but will transfuse 1 unit of PRBC  -no overt signs/symptoms of bleeding  -started on AMY 20,000 units weekly   -iron studies reviewed; started on IV iron  -GI consult appreciated; no acute intervention required. No active evidence of bleeding.    Valvular Heart Disease  -TTE reviewed  -cardiology recommendations appreciated    VPCs  -hold flecainide per cardiology  -continue close telemonitoring    HTN  -BP elevated; continue hydralazine, nifedipine, and add clonidine with holding parameters  -no beta-blockers due to hives  -low sodium diet    DVT ppx - SCDs    PT - home PT upon discharge      Attending Attestation:   Plan discussed with patient, daughter at bedside, Dr. Ulloa, Dr. Mcnulty, RN

## 2019-12-20 NOTE — PROGRESS NOTE ADULT - SUBJECTIVE AND OBJECTIVE BOX
CHIEF COMPLAINT/INTERVAL HISTORY:    Patient is a 85y old  Male who presents with a chief complaint of shortness of breath (20 Dec 2019 13:50)    SUBJECTIVE & OBJECTIVE: Pt seen and examined at bedside. No overnight events. Patient reports feeling tired, but otherwise denies any new complaints. Off supplemental O2. Hb 8.0; will transfuse 1 unit of PRBCs. Stop lasix gtt; transition to PO lasix in AM.    ROS: No chest pain, palpitations, SOB, light headedness, dizziness, headache, nausea/vomiting, fevers/chills, abdominal pain, dysuria or increased urinary frequency.    ICU Vital Signs Last 24 Hrs  T(C): 36.3 (20 Dec 2019 15:22), Max: 36.9 (20 Dec 2019 05:26)  T(F): 97.4 (20 Dec 2019 15:22), Max: 98.4 (20 Dec 2019 05:26)  HR: 58 (20 Dec 2019 15:22) (58 - 76)  BP: 122/52 (20 Dec 2019 15:22) (122/52 - 175/74)  RR: 18 (20 Dec 2019 15:22) (18 - 20)  SpO2: 100% (20 Dec 2019 15:22) (97% - 100%)    MEDICATIONS  (STANDING):  ascorbic acid 500 milliGRAM(s) Oral daily  aspirin enteric coated 81 milliGRAM(s) Oral daily  atorvastatin 40 milliGRAM(s) Oral at bedtime  calcitriol   Capsule 0.25 MICROGram(s) Oral daily  cloNIDine 0.1 milliGRAM(s) Oral two times a day  epoetin juan Injectable 70776 Unit(s) SubCutaneous every 7 days  hydrALAZINE 100 milliGRAM(s) Oral every 8 hours  Nephro-pito 1 Tablet(s) Oral daily  NIFEdipine XL 90 milliGRAM(s) Oral daily  NIFEdipine XL 60 milliGRAM(s) Oral at bedtime  pantoprazole    Tablet 40 milliGRAM(s) Oral before breakfast  sodium bicarbonate 1300 milliGRAM(s) Oral two times a day    MEDICATIONS  (PRN):      LABS:                        8.0    7.51  )-----------( 215      ( 20 Dec 2019 06:35 )             25.6     12-20    139  |  102  |  110.0<H>  ----------------------------<  97  3.8   |  16.0<L>  |  5.76<H>    Ca    9.3      20 Dec 2019 06:35  Phos  4.5     12-20  Mg     2.1     12-20     PHYSICAL EXAM:    GENERAL: elderly male, chronically ill appearing, not in acute distress  HEAD:  Atraumatic, Normocephalic  EYES: EOMI, PERRLA, conjunctiva and sclera clear  ENMT: Moist mucous membranes  NECK: Supple  NERVOUS SYSTEM:  Alert & Oriented X3  CHEST/LUNG: Clear to auscultation bilaterally; No rales, rhonchi, wheezing, or rubs  HEART: Regular rate and rhythm; + S1/S2, + murmur  ABDOMEN: Soft, Nontender, Nondistended; Bowel sounds present  EXTREMITIES:  ++ LE edema (left > right)

## 2019-12-20 NOTE — PROGRESS NOTE ADULT - SUBJECTIVE AND OBJECTIVE BOX
[place] [facility] - [facility]                                   [address] [place] [address]                                                      PHONE: [phone]                                                         FAX: [phone]  [email or web address]  ---------------------------------------------------------------------------------------------------------------------------------    Overnight events/patient complaints: still in NSR, appears euvolemic, diuresing well with IV lasix      Plavix (Hives)  Toprol-XL (Rash)    MEDICATIONS  (STANDING):  ascorbic acid 500 milliGRAM(s) Oral daily  aspirin enteric coated 81 milliGRAM(s) Oral daily  atorvastatin 40 milliGRAM(s) Oral at bedtime  calcitriol   Capsule 0.25 MICROGram(s) Oral daily  epoetin juan Injectable 48208 Unit(s) SubCutaneous every 7 days  ferrous    sulfate 325 milliGRAM(s) Oral three times a day  furosemide Infusion 10 mG/Hr (5 mL/Hr) IV Continuous <Continuous>  hydrALAZINE 100 milliGRAM(s) Oral every 8 hours  iron sucrose IVPB 200 milliGRAM(s) IV Intermittent every 24 hours  metolazone 5 milliGRAM(s) Oral daily  Nephro-pito 1 Tablet(s) Oral daily  NIFEdipine XL 90 milliGRAM(s) Oral daily  NIFEdipine XL 60 milliGRAM(s) Oral at bedtime  pantoprazole    Tablet 40 milliGRAM(s) Oral before breakfast  sodium bicarbonate 1300 milliGRAM(s) Oral two times a day    MEDICATIONS  (PRN):      Vital Signs Last 24 Hrs  T(C): 36.9 (20 Dec 2019 05:26), Max: 36.9 (20 Dec 2019 05:26)  T(F): 98.4 (20 Dec 2019 05:26), Max: 98.4 (20 Dec 2019 05:26)  HR: 71 (20 Dec 2019 05:26) (64 - 71)  BP: 175/74 (20 Dec 2019 05:26) (122/52 - 175/74)  BP(mean): --  RR: 18 (20 Dec 2019 05:26) (18 - 20)  SpO2: 97% (20 Dec 2019 05:26) (91% - 97%)  Daily     Daily Weight in k.8 (20 Dec 2019 00:41)  ICU Vital Signs Last 24 Hrs  CHRISTIANO ELIZABETH  I&O's Detail    19 Dec 2019 07:  -  20 Dec 2019 07:00  --------------------------------------------------------  IN:    Oral Fluid: 300 mL  Total IN: 300 mL    OUT:    Voided: 2050 mL  Total OUT: 2050 mL    Total NET: -1750 mL      20 Dec 2019 07:  -  20 Dec 2019 08:30  --------------------------------------------------------  IN:  Total IN: 0 mL    OUT:    Voided: 800 mL  Total OUT: 800 mL    Total NET: -800 mL        I&O's Summary    19 Dec 2019 07:  -  20 Dec 2019 07:00  --------------------------------------------------------  IN: 300 mL / OUT: 2050 mL / NET: -1750 mL    20 Dec 2019 07:  -  20 Dec 2019 08:30  --------------------------------------------------------  IN: 0 mL / OUT: 800 mL / NET: -800 mL      Drug Dosing Weight  CHRISTIANO ELIZABETH      PHYSICAL EXAM:  General: Appears well developed, well nourished alert and cooperative.  HEENT: Head; normocephalic, atraumatic.  Eyes: Pupils reactive, cornea wnl.  Neck: Supple, no nodes adenopathy, no NVD or carotid bruit or thyromegaly.  CARDIOVASCULAR: Normal S1 and S2, 3/6 systolic murmur  LUNGS: No rales, rhonchi or wheeze. Normal breath sounds bilaterally.  ABDOMEN: Soft, nontender without mass or organomegaly. bowel sounds normoactive.  EXTREMITIES: No clubbing, cyanosis or edema. Distal pulses wnl.   SKIN: warm and dry with normal turgor.  NEURO: Alert/oriented x 3/normal motor exam. No pathologic reflexes.    PSYCH: normal affect.        LABS:                        8.0    7.51  )-----------( 215      ( 20 Dec 2019 06:35 )             25.6     12-20    139  |  102  |  110.0<H>  ----------------------------<  97  3.8   |  16.0<L>  |  5.76<H>    Ca    9.3      20 Dec 2019 06:35  Phos  4.5     12-20  Mg     2.1     12-20    TPro  7.1  /  Alb  4.2  /  TBili  0.4  /  DBili  x   /  AST  23  /  ALT  31  /  AlkPhos  105  12-18    CHRISTIANO ELIZABETH  CARDIAC MARKERS ( 18 Dec 2019 16:26 )  x     / 0.06 ng/mL / x     / x     / x          PT/INR - ( 18 Dec 2019 16:26 )   PT: 12.3 sec;   INR: 1.07 ratio         PTT - ( 18 Dec 2019 16:26 )  PTT:37.8 sec      RADIOLOGY & ADDITIONAL STUDIES:    INTERPRETATION OF TELEMETRY (personally reviewed):    ECG:< from: 12 Lead ECG (19 @ 15:58) >  Diagnosis Line Sinus rhythm with 1st degree A-V block  Non-specific intra-ventricular conduction delay  Borderline ECG    Confirmed by ALYSIA YOUNG ([id number]) on 2019 12:45:34 PM    < end of copied text >      ECHO:< from: TTE Echo Complete w/Doppler (12.18.19 @ 20:19) >  Summary:   1. Left ventricular ejection fraction, by visual estimation, is 65 to 70%.   2. Normal global left ventricular systolic function.   3. LV Ejection Fraction by Reynoso's Method with a biplane EF of 71 %.   4. Mildly increased LV wall thickness.   5. Normal left ventricular internal cavity size.   6. Spectral Doppler shows impaired relaxation pattern of left ventricular myocardial filling (Grade I diastolicdysfunction).   7. There is mild concentric left ventricular hypertrophy.   8. Moderately enlarged left atrium.   9. Degenerative mitral valve.  10. Mild to moderate mitral valve regurgitation.  11. Moderate mitral annular calcification.  12. Moderate thickening and calcification of the anterior and posterior mitral valve leaflets.  13. Mild to moderate aortic valve stenosis.  14. Estimated pulmonary artery systolic pressure is 54.4 mmHg assuming a right atrial pressure of 15 mmHg, which is consistent with moderate pulmonary hypertension.  15. Mitral valve mean gradient is 7.0 mmHg consistent with moderate mitral stenosis.  16. The aortic valve mean gradient is 15.5 mmHg consistent with mild aortic stenosis.    MD Shashi Electronically signed on 2019 at 11:42:02 AM         < end of copied text >      STRESS TEST:    CARDIAC CATHETERIZATION:    ASSESSMENT AND PLAN:  In summary, CHRISTIANO ELIZABETH is an 85y Male with past medical history significant for

## 2019-12-20 NOTE — PROGRESS NOTE ADULT - ASSESSMENT
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

## 2019-12-20 NOTE — CDI QUERY NOTE - NSCDIOTHERTXTBX_GEN_ALL_CORE_HH
Can you please clarify acuity of HfpEF?    A.	Acute on chronic HfpEF  B.	Acute HfpEF  C.	Other, please specify  D.	Not clinically significant    Supporting Documentations:    Laboratory:  Serum Pro-BNP  12/18/19: [id number]    12/18/19 Xray Chest 1 View:  IMPRESSION:   Cardiomegaly with the early interstitial opacities/infiltrates RIGHT greater than LEFT and small bilateral effusions concerning for  pulmonary edema and effusion of cardiac origin..      Medications:  Furosemide 80 mg IVP x1 dose. Given 12/18/19  Furosemide infusion infuse at 5ml/hr. 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

## 2019-12-20 NOTE — PROGRESS NOTE ADULT - SUBJECTIVE AND OBJECTIVE BOX
[facility] DIVISION OF KIDNEY DISEASES AND HYPERTENSION -- FOLLOW UP NOTE  --------------------------------------------------------------------------------  Chief Complaint: CKD stage V    24 hour events/subjective:  Lasix gtt d/c;ed this AM  Pt seen and examined; keeps dozing off during encounter  States he is tired        PAST HISTORY  --------------------------------------------------------------------------------  No significant changes to PMH, PSH, FHx, SHx, unless otherwise noted    ALLERGIES & MEDICATIONS  --------------------------------------------------------------------------------  Allergies    Plavix (Hives)  Toprol-XL (Rash)    Intolerances      Standing Inpatient Medications  ascorbic acid 500 milliGRAM(s) Oral daily  aspirin enteric coated 81 milliGRAM(s) Oral daily  atorvastatin 40 milliGRAM(s) Oral at bedtime  calcitriol   Capsule 0.25 MICROGram(s) Oral daily  cloNIDine 0.1 milliGRAM(s) Oral two times a day  epoetin juan Injectable 89244 Unit(s) SubCutaneous every 7 days  hydrALAZINE 100 milliGRAM(s) Oral every 8 hours  Nephro-pito 1 Tablet(s) Oral daily  NIFEdipine XL 90 milliGRAM(s) Oral daily  NIFEdipine XL 60 milliGRAM(s) Oral at bedtime  pantoprazole    Tablet 40 milliGRAM(s) Oral before breakfast  sodium bicarbonate 1300 milliGRAM(s) Oral two times a day    PRN Inpatient Medications  furosemide   Injectable 40 milliGRAM(s) IV Push once PRN      REVIEW OF SYSTEMS  --------------------------------------------------------------------------------  Gen:   fatigue+  Skin: No rashes  Head/Eyes/Ears/Mouth: No headache; Normal hearing; Normal vision w/o blurriness  Respiratory: dyspnea , No cough, wheezing, hemoptysis  CV: No chest pain, PND, orthopnea  GI: No abdominal pain, diarrhea, constipation, nausea, vomiting, melena, hematochezia  : decreased urination+  MSK: No joint pain/swelling; no back pain; leg edema  Neuro: No dizziness/lightheadedness  Heme: No easy bruising or bleeding  Endo: No heat/cold intolerance  Psych: No significant nervousness, anxiety, stress, depression    All other systems were reviewed and are negative, except as noted.  VITALS/PHYSICAL EXAM  --------------------------------------------------------------------------------  T(C): 36.6 (12-20-19 @ 11:10), Max: 36.9 (12-20-19 @ 05:26)  HR: 61 (12-20-19 @ 11:10) (61 - 71)  BP: 140/62 (12-20-19 @ 11:10) (140/62 - 175/74)  RR: 20 (12-20-19 @ 11:10) (18 - 20)  SpO2: 97% (12-20-19 @ 11:10) (91% - 97%)  Wt(kg): --  Height (cm): 165.1 (12-18-19 @ 15:35)  Weight (kg): 69.1 (12-19-19 @ 12:05)  BMI (kg/m2): 25.4 (12-19-19 @ 12:05)  BSA (m2): 1.76 (12-19-19 @ 12:05)      12-19-19 @ 07:01  -  12-20-19 @ 07:00  --------------------------------------------------------  IN: 300 mL / OUT: 2050 mL / NET: -1750 mL    12-20-19 @ 07:01  -  12-20-19 @ 13:51  --------------------------------------------------------  IN: 0 mL / OUT: 800 mL / NET: -800 mL      Physical Exam:  	Gen: elderly man, pale, fatigued  	HEENT: on supplemental O2 via NC  	Pulm: cta b/l  	CV: RRR, S1S2; no rub  	Back: No spinal or CVA tenderness  	Abd: +BS, soft, nontender/nondistended  	: No suprapubic tenderness  	UE: Warm,  no edema; no asterixis  	LE: Warm, trace LE edema  	Neuro: No focal deficits  	Psych: Normal affect and mood  	Skin: Warm, without rashes  	Vascular access: LUE AVF, thrill+ bruit+      LABS/STUDIES  --------------------------------------------------------------------------------              8.0    7.51  >-----------<  215      [12-20-19 @ 06:35]              25.6     139  |  102  |  110.0  ----------------------------<  97      [12-20-19 @ 06:35]  3.8   |  16.0  |  5.76        Ca     9.3     [12-20-19 @ 06:35]      Mg     2.1     [12-20-19 @ 06:35]      Phos  4.5     [12-20-19 @ 06:35]    TPro  7.1  /  Alb  4.2  /  TBili  0.4  /  DBili  x   /  AST  23  /  ALT  31  /  AlkPhos  105  [12-18-19 @ 16:26]    PT/INR: PT 12.3 , INR 1.07       [12-18-19 @ 16:26]  PTT: 37.8       [12-18-19 @ 16:26]    Troponin 0.06      [12-18-19 @ 16:26]    Creatinine Trend:  SCr 5.76 [12-20 @ 06:35]  SCr 5.67 [12-19 @ 17:10]  SCr 5.28 [12-19 @ 06:55]  SCr 5.30 [12-18 @ 16:26]        Iron 67, TIBC 240, %sat 28      [12-20-19 @ 06:35]  Ferritin 247      [12-20-19 @ 06:35]  HbA1c 4.9      [08-09-18 @ 05:54]

## 2019-12-20 NOTE — PROGRESS NOTE ADULT - ASSESSMENT
Anemia of chronic disease and CRF.  Known Gastritis and colon polyps.  No active GI bleeding.  + FOBT has already been evaluated.    To continue chronic Low dose PPI for Cytoprotection.    No further GI intervention required.    Reconsult as needed.

## 2019-12-21 ENCOUNTER — TRANSCRIPTION ENCOUNTER (OUTPATIENT)
Age: 84
End: 2019-12-21

## 2019-12-21 LAB
ANION GAP SERPL CALC-SCNC: 19 MMOL/L — HIGH (ref 5–17)
BASOPHILS # BLD AUTO: 0.08 K/UL — SIGNIFICANT CHANGE UP (ref 0–0.2)
BASOPHILS NFR BLD AUTO: 1.1 % — SIGNIFICANT CHANGE UP (ref 0–2)
BUN SERPL-MCNC: 121 MG/DL — HIGH (ref 8–20)
CALCIUM SERPL-MCNC: 9.1 MG/DL — SIGNIFICANT CHANGE UP (ref 8.6–10.2)
CHLORIDE SERPL-SCNC: 105 MMOL/L — SIGNIFICANT CHANGE UP (ref 98–107)
CO2 SERPL-SCNC: 18 MMOL/L — LOW (ref 22–29)
CREAT SERPL-MCNC: 5.72 MG/DL — HIGH (ref 0.5–1.3)
EOSINOPHIL # BLD AUTO: 0.18 K/UL — SIGNIFICANT CHANGE UP (ref 0–0.5)
EOSINOPHIL NFR BLD AUTO: 2.4 % — SIGNIFICANT CHANGE UP (ref 0–6)
GLUCOSE SERPL-MCNC: 102 MG/DL — SIGNIFICANT CHANGE UP (ref 70–115)
HCT VFR BLD CALC: 26.5 % — LOW (ref 39–50)
HGB BLD-MCNC: 8.7 G/DL — LOW (ref 13–17)
IMM GRANULOCYTES NFR BLD AUTO: 0.7 % — SIGNIFICANT CHANGE UP (ref 0–1.5)
LYMPHOCYTES # BLD AUTO: 0.76 K/UL — LOW (ref 1–3.3)
LYMPHOCYTES # BLD AUTO: 10.2 % — LOW (ref 13–44)
MAGNESIUM SERPL-MCNC: 2 MG/DL — SIGNIFICANT CHANGE UP (ref 1.8–2.6)
MCHC RBC-ENTMCNC: 30.3 PG — SIGNIFICANT CHANGE UP (ref 27–34)
MCHC RBC-ENTMCNC: 32.8 GM/DL — SIGNIFICANT CHANGE UP (ref 32–36)
MCV RBC AUTO: 92.3 FL — SIGNIFICANT CHANGE UP (ref 80–100)
MONOCYTES # BLD AUTO: 0.68 K/UL — SIGNIFICANT CHANGE UP (ref 0–0.9)
MONOCYTES NFR BLD AUTO: 9.1 % — SIGNIFICANT CHANGE UP (ref 2–14)
NEUTROPHILS # BLD AUTO: 5.72 K/UL — SIGNIFICANT CHANGE UP (ref 1.8–7.4)
NEUTROPHILS NFR BLD AUTO: 76.5 % — SIGNIFICANT CHANGE UP (ref 43–77)
PHOSPHATE SERPL-MCNC: 4.5 MG/DL — SIGNIFICANT CHANGE UP (ref 2.4–4.7)
PLATELET # BLD AUTO: 213 K/UL — SIGNIFICANT CHANGE UP (ref 150–400)
POTASSIUM SERPL-MCNC: 3.5 MMOL/L — SIGNIFICANT CHANGE UP (ref 3.5–5.3)
POTASSIUM SERPL-SCNC: 3.5 MMOL/L — SIGNIFICANT CHANGE UP (ref 3.5–5.3)
RBC # BLD: 2.87 M/UL — LOW (ref 4.2–5.8)
RBC # FLD: 14.7 % — HIGH (ref 10.3–14.5)
SODIUM SERPL-SCNC: 142 MMOL/L — SIGNIFICANT CHANGE UP (ref 135–145)
WBC # BLD: 7.47 K/UL — SIGNIFICANT CHANGE UP (ref 3.8–10.5)
WBC # FLD AUTO: 7.47 K/UL — SIGNIFICANT CHANGE UP (ref 3.8–10.5)

## 2019-12-21 PROCEDURE — 99233 SBSQ HOSP IP/OBS HIGH 50: CPT

## 2019-12-21 RX ORDER — PANTOPRAZOLE SODIUM 20 MG/1
1 TABLET, DELAYED RELEASE ORAL
Qty: 30 | Refills: 0
Start: 2019-12-21 | End: 2020-01-19

## 2019-12-21 RX ORDER — PANTOPRAZOLE SODIUM 20 MG/1
1 TABLET, DELAYED RELEASE ORAL
Qty: 0 | Refills: 0 | DISCHARGE
Start: 2019-12-21 | End: 2020-01-19

## 2019-12-21 RX ADMIN — ATORVASTATIN CALCIUM 40 MILLIGRAM(S): 80 TABLET, FILM COATED ORAL at 21:06

## 2019-12-21 RX ADMIN — Medication 100 MILLIGRAM(S): at 13:46

## 2019-12-21 RX ADMIN — Medication 20 MILLIGRAM(S): at 05:03

## 2019-12-21 RX ADMIN — Medication 90 MILLIGRAM(S): at 05:03

## 2019-12-21 RX ADMIN — Medication 20 MILLIGRAM(S): at 17:17

## 2019-12-21 RX ADMIN — Medication 81 MILLIGRAM(S): at 08:26

## 2019-12-21 RX ADMIN — PANTOPRAZOLE SODIUM 40 MILLIGRAM(S): 20 TABLET, DELAYED RELEASE ORAL at 05:04

## 2019-12-21 RX ADMIN — Medication 1300 MILLIGRAM(S): at 17:17

## 2019-12-21 RX ADMIN — Medication 100 MILLIGRAM(S): at 05:02

## 2019-12-21 RX ADMIN — Medication 1300 MILLIGRAM(S): at 05:02

## 2019-12-21 RX ADMIN — Medication 0.1 MILLIGRAM(S): at 05:03

## 2019-12-21 RX ADMIN — Medication 60 MILLIGRAM(S): at 21:06

## 2019-12-21 RX ADMIN — Medication 500 MILLIGRAM(S): at 08:26

## 2019-12-21 RX ADMIN — CALCITRIOL 0.25 MICROGRAM(S): 0.5 CAPSULE ORAL at 08:26

## 2019-12-21 RX ADMIN — IRON SUCROSE 110 MILLIGRAM(S): 20 INJECTION, SOLUTION INTRAVENOUS at 05:04

## 2019-12-21 RX ADMIN — Medication 100 MILLIGRAM(S): at 21:06

## 2019-12-21 RX ADMIN — Medication 1 TABLET(S): at 08:26

## 2019-12-21 NOTE — PROGRESS NOTE ADULT - SUBJECTIVE AND OBJECTIVE BOX
[place] [facility]                                   [address] [place] [address]                                                      PHONE: [phone]                                                         FAX: [phone]  [email or web address]  ---------------------------------------------------------------------------------------------------------------------------------    Overnight events/patient complaints: patient seen at bedside. he is feeling much better.       Plavix (Hives)  Toprol-XL (Rash)    MEDICATIONS  (STANDING):  ascorbic acid 500 milliGRAM(s) Oral daily  aspirin enteric coated 81 milliGRAM(s) Oral daily  atorvastatin 40 milliGRAM(s) Oral at bedtime  calcitriol   Capsule 0.25 MICROGram(s) Oral daily  cloNIDine 0.1 milliGRAM(s) Oral two times a day  epoetin juan Injectable 05054 Unit(s) SubCutaneous every 7 days  hydrALAZINE 100 milliGRAM(s) Oral every 8 hours  iron sucrose IVPB 200 milliGRAM(s) IV Intermittent every 24 hours  Nephro-pito 1 Tablet(s) Oral daily  NIFEdipine XL 90 milliGRAM(s) Oral daily  NIFEdipine XL 60 milliGRAM(s) Oral at bedtime  pantoprazole    Tablet 40 milliGRAM(s) Oral before breakfast  sodium bicarbonate 1300 milliGRAM(s) Oral two times a day  torsemide 20 milliGRAM(s) Oral two times a day    MEDICATIONS  (PRN):      Vital Signs Last 24 Hrs  T(C): 36.8 (21 Dec 2019 09:51), Max: 36.8 (21 Dec 2019 04:58)  T(F): 98.2 (21 Dec 2019 09:51), Max: 98.3 (21 Dec 2019 04:58)  HR: 60 (21 Dec 2019 09:51) (56 - 76)  BP: 118/38 (21 Dec 2019 09:51) (118/38 - 161/55)  BP(mean): --  RR: 18 (21 Dec 2019 09:51) (18 - 20)  SpO2: 98% (21 Dec 2019 09:51) (97% - 100%)  ICU Vital Signs Last 24 Hrs  CHRISTIANO ELIZABETH  I&O's Detail    20 Dec 2019 07:01  -  21 Dec 2019 07:00  --------------------------------------------------------  IN:    Oral Fluid: 240 mL  Total IN: 240 mL    OUT:    Voided: 3500 mL  Total OUT: 3500 mL    Total NET: -3260 mL        Drug Dosing Weight  CHRISTIANO ELIZABETH      PHYSICAL EXAM:  General: Appears well developed, well nourished alert and cooperative.  HEENT: Head; normocephalic, atraumatic.  Eyes: Pupils reactive, cornea wnl.  Neck: Supple, no nodes adenopathy, no NVD or carotid bruit or thyromegaly.  CARDIOVASCULAR: Normal S1 and S2, No murmur, rub, gallop or lift.   LUNGS: No rales, rhonchi or wheeze. Normal breath sounds bilaterally.  ABDOMEN: Soft, nontender without mass or organomegaly. bowel sounds normoactive.  EXTREMITIES: No clubbing, cyanosis or edema. Distal pulses wnl.   SKIN: warm and dry with normal turgor.  NEURO: Alert/oriented x 3/normal motor exam. No pathologic reflexes.    PSYCH: normal affect.        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 I will arrange for close outpatient follow up with Dr. Peterson.

## 2019-12-21 NOTE — PROGRESS NOTE ADULT - SUBJECTIVE AND OBJECTIVE BOX
[facility] DIVISION OF KIDNEY DISEASES AND HYPERTENSION -- FOLLOW UP NOTE  --------------------------------------------------------------------------------  Chief Complaint:    24 hour events/subjective:        PAST HISTORY  --------------------------------------------------------------------------------  No significant changes to PMH, PSH, FHx, SHx, unless otherwise noted    ALLERGIES & MEDICATIONS  --------------------------------------------------------------------------------  Allergies    Plavix (Hives)  Toprol-XL (Rash)    Intolerances      Standing Inpatient Medications  ascorbic acid 500 milliGRAM(s) Oral daily  aspirin enteric coated 81 milliGRAM(s) Oral daily  atorvastatin 40 milliGRAM(s) Oral at bedtime  calcitriol   Capsule 0.25 MICROGram(s) Oral daily  cloNIDine 0.1 milliGRAM(s) Oral two times a day  epoetin juan Injectable 94827 Unit(s) SubCutaneous every 7 days  hydrALAZINE 100 milliGRAM(s) Oral every 8 hours  iron sucrose IVPB 200 milliGRAM(s) IV Intermittent every 24 hours  Nephro-pito 1 Tablet(s) Oral daily  NIFEdipine XL 90 milliGRAM(s) Oral daily  NIFEdipine XL 60 milliGRAM(s) Oral at bedtime  pantoprazole    Tablet 40 milliGRAM(s) Oral before breakfast  sodium bicarbonate 1300 milliGRAM(s) Oral two times a day  torsemide 20 milliGRAM(s) Oral two times a day    PRN Inpatient Medications      REVIEW OF SYSTEMS  --------------------------------------------------------------------------------  Gen: No weight changes, fatigue, fevers/chills, weakness  Skin: No rashes  Head/Eyes/Ears/Mouth: No headache; Normal hearing; Normal vision w/o blurriness; No sinus pain/discomfort, sore throat  Respiratory: No dyspnea, cough, wheezing, hemoptysis  CV: No chest pain, PND, orthopnea  GI: No abdominal pain, diarrhea, constipation, nausea, vomiting, melena, hematochezia  : No increased frequency, dysuria, hematuria, nocturia  MSK: No joint pain/swelling; no back pain; no edema  Neuro: No dizziness/lightheadedness, weakness, seizures, numbness, tingling  Heme: No easy bruising or bleeding  Endo: No heat/cold intolerance  Psych: No significant nervousness, anxiety, stress, depression    All other systems were reviewed and are negative, except as noted.    VITALS/PHYSICAL EXAM  --------------------------------------------------------------------------------  T(C): 36.8 (12-21-19 @ 09:51), Max: 36.8 (12-21-19 @ 04:58)  HR: 60 (12-21-19 @ 09:51) (56 - 70)  BP: 118/38 (12-21-19 @ 09:51) (118/38 - 147/58)  RR: 18 (12-21-19 @ 09:51) (18 - 19)  SpO2: 98% (12-21-19 @ 09:51) (97% - 100%)  Wt(kg): --    Weight (kg): 69.1 (12-19-19 @ 12:05)      12-20-19 @ 07:01  -  12-21-19 @ 07:00  --------------------------------------------------------  IN: 240 mL / OUT: 3500 mL / NET: -3260 mL    12-21-19 @ 07:01  -  12-21-19 @ 12:04  --------------------------------------------------------  IN: 120 mL / OUT: 700 mL / NET: -580 mL      Physical Exam:  	Gen: NAD, well-appearing  	HEENT: PERRL, supple neck, clear oropharynx  	Pulm: CTA B/L  	CV: RRR, S1S2; no rub  	Back: No spinal or CVA tenderness; no sacral edema  	Abd: +BS, soft, nontender/nondistended  	: No suprapubic tenderness  	UE: Warm, FROM, no clubbing, intact strength; no edema; no asterixis  	LE: Warm, FROM, no clubbing, intact strength; no edema  	Neuro: No focal deficits, intact gait  	Psych: Normal affect and mood  	Skin: Warm, without rashes  	Vascular access:    LABS/STUDIES  --------------------------------------------------------------------------------              8.7    7.47  >-----------<  213      [12-21-19 @ 06:59]              26.5     142  |  105  |  121.0  ----------------------------<  102      [12-21-19 @ 06:59]  3.5   |  18.0  |  5.72        Ca     9.1     [12-21-19 @ 06:59]      Mg     2.0     [12-21-19 @ 06:59]      Phos  4.5     [12-21-19 @ 06:59]            Creatinine Trend:  SCr 5.72 [12-21 @ 06:59]  SCr 5.76 [12-20 @ 06:35]  SCr 5.67 [12-19 @ 17:10]  SCr 5.28 [12-19 @ 06:55]  SCr 5.30 [12-18 @ 16:26]        Iron 67, TIBC 240, %sat 28      [12-20-19 @ 06:35]  Ferritin 247      [12-20-19 @ 06:35]  HbA1c 4.9      [08-09-18 @ 05:54] [facility] DIVISION OF KIDNEY DISEASES AND HYPERTENSION -- FOLLOW UP NOTE  --------------------------------------------------------------------------------  Chief Complaint: CKD stage V    24 hour events/subjective:  off lasix gtt; s/p 1 U PRBC transfusion   Pt seen and examined; feels tired    PAST HISTORY  --------------------------------------------------------------------------------  No significant changes to PMH, PSH, FHx, SHx, unless otherwise noted    ALLERGIES & MEDICATIONS  --------------------------------------------------------------------------------  Allergies    Plavix (Hives)  Toprol-XL (Rash)    Intolerances      Standing Inpatient Medications  ascorbic acid 500 milliGRAM(s) Oral daily  aspirin enteric coated 81 milliGRAM(s) Oral daily  atorvastatin 40 milliGRAM(s) Oral at bedtime  calcitriol   Capsule 0.25 MICROGram(s) Oral daily  cloNIDine 0.1 milliGRAM(s) Oral two times a day  epoetin juan Injectable 21833 Unit(s) SubCutaneous every 7 days  hydrALAZINE 100 milliGRAM(s) Oral every 8 hours  iron sucrose IVPB 200 milliGRAM(s) IV Intermittent every 24 hours  Nephro-pito 1 Tablet(s) Oral daily  NIFEdipine XL 90 milliGRAM(s) Oral daily  NIFEdipine XL 60 milliGRAM(s) Oral at bedtime  pantoprazole    Tablet 40 milliGRAM(s) Oral before breakfast  sodium bicarbonate 1300 milliGRAM(s) Oral two times a day  torsemide 20 milliGRAM(s) Oral two times a day    PRN Inpatient Medications      REVIEW OF SYSTEMS  --------------------------------------------------------------------------------  Gen:   fatigue+  Skin: No rashes  Head/Eyes/Ears/Mouth: No headache; Normal hearing; Normal vision w/o blurriness  Respiratory: dyspnea , No cough, wheezing, hemoptysis  CV: No chest pain, PND, orthopnea  GI: No abdominal pain, diarrhea, constipation, nausea, vomiting,  : no changes in urination  MSK: No joint pain/swelling; no back pain; leg edema  Neuro: No dizziness/lightheadedness  Heme: No easy bruising or bleeding  Endo: No heat/cold intolerance  Psych: No significant nervousness, anxiety, stress, depression    All other systems were reviewed and are negative, except as noted.    VITALS/PHYSICAL EXAM  --------------------------------------------------------------------------------  T(C): 36.8 (12-21-19 @ 09:51), Max: 36.8 (12-21-19 @ 04:58)  HR: 60 (12-21-19 @ 09:51) (56 - 70)  BP: 118/38 (12-21-19 @ 09:51) (118/38 - 147/58)  RR: 18 (12-21-19 @ 09:51) (18 - 19)  SpO2: 98% (12-21-19 @ 09:51) (97% - 100%)  Wt(kg): --    Weight (kg): 69.1 (12-19-19 @ 12:05)      12-20-19 @ 07:01  -  12-21-19 @ 07:00  --------------------------------------------------------  IN: 240 mL / OUT: 3500 mL / NET: -3260 mL    12-21-19 @ 07:01  -  12-21-19 @ 12:04  --------------------------------------------------------  IN: 120 mL / OUT: 700 mL / NET: -580 mL      Physical Exam:  	Gen: elderly man, pale  	HEENT: on RA  	Pulm: cta b/l  	CV: RRR, S1S2; no rub  	Back: No spinal or CVA tenderness  	Abd: +BS, soft, nontender/nondistended  	: No suprapubic tenderness  	UE: Warm,  no edema; no asterixis  	LE: Warm, trace LE edema  	Neuro: No focal deficits  	Psych: Normal affect and mood  	Skin: Warm, without rashes  	Vascular access: johnathon SMALLS+ bruit+    LABS/STUDIES  --------------------------------------------------------------------------------              8.7    7.47  >-----------<  213      [12-21-19 @ 06:59]              26.5     142  |  105  |  121.0  ----------------------------<  102      [12-21-19 @ 06:59]  3.5   |  18.0  |  5.72        Ca     9.1     [12-21-19 @ 06:59]      Mg     2.0     [12-21-19 @ 06:59]      Phos  4.5     [12-21-19 @ 06:59]            Creatinine Trend:  SCr 5.72 [12-21 @ 06:59]  SCr 5.76 [12-20 @ 06:35]  SCr 5.67 [12-19 @ 17:10]  SCr 5.28 [12-19 @ 06:55]  SCr 5.30 [12-18 @ 16:26]        Iron 67, TIBC 240, %sat 28      [12-20-19 @ 06:35]  Ferritin 247      [12-20-19 @ 06:35]  HbA1c 4.9      [08-09-18 @ 05:54]

## 2019-12-21 NOTE — PROGRESS NOTE ADULT - PROVIDER SPECIALTY LIST ADULT
Cardiology 

## 2019-12-21 NOTE — DISCHARGE NOTE PROVIDER - NSDCFUSCHEDAPPT_GEN_ALL_CORE_FT
CHRISTIANO ELIZABETH ; 01/02/2020 ; [facility]  CHRISTIANO ELIZABETH ; 01/02/2020 ; [facility]

## 2019-12-21 NOTE — DISCHARGE NOTE PROVIDER - CARE PROVIDER_API CALL
Mango Peterson)  Cardiovascular Disease; Internal Medicine  [address] [place] [address]  Phone: [phone]  Fax: [phone]  Follow Up Time:     Bay Avendano)  Internal Medicine; Nephrology  [address] [place] [address]  Phone: [phone]  Fax: [phone]  Follow Up Time:

## 2019-12-21 NOTE — PROGRESS NOTE ADULT - ASSESSMENT
Pt with CKD stage V, admitted for volume overload    1) Hypervolemia in setting of advanced CKD   2)CKD stage V- not on dialysis  3) Anemia of chronic disease  4) HTN  5) Metabolic acidosis      Volume status improved , pt s/p lasix gtt  Resume Torsemide 20 mg bid and metalozone 2.5 mg prn on discharge  Pt with uremic symptoms; family however reluctant to start dialysis  Hb low; continue iron and AMY  Bp stable; continue antihypertensive regimen  Continue sodium bicarb po ; monitor serum Co2 levels  Nephrology follow up out pt next week Pt with CKD stage V, admitted for volume overload    1) Hypervolemia in setting of advanced CKD   2)CKD stage V- not on dialysis  3) Anemia of chronic disease  4) HTN  5) Metabolic acidosis      Volume status improved , pt s/p lasix gtt  Resume Torsemide 20 mg bid and metalozone 5 mg prn on discharge  Hb low; continue iron and AMY  Bp stable; continue antihypertensive regimen  Continue sodium bicarb po ; monitor serum Co2 levels  Nephrology follow up out pt next week

## 2019-12-21 NOTE — PROGRESS NOTE ADULT - ASSESSMENT
The patient is an 85 year old male with a history of CKD stage 5, anemia on chronic disease and previous GI bleed status post colonoscopy with two ulcerated polyps s/p resections and colonic AVMs, EGD with gastritis, VPCs who presented from [facility] for complaints of orthopnea and difficulty breathing. Admitted for acute on chronic diastolic CHF secondary to advanced renal disease. Clinically improved with IV lasix infusion. Transitioned to PO torsemide BID with metalozone 3x per week. Nephrology was consulted, patient's family not in favor of HD at this time. Transfused 1 unit of PRBC for acute on chronic anemia. FOBT was positive; GI was consulted, given recent history of EGD and colonoscopy recommended no further intervention at this time. To continue PPI BID and monitor. Patient was taken of flecanide due to risk of proarrythmia while in SR.     Assessment/Plan:    1. Volume overload in the setting of advanced renal disease and acute on chronic diastolic heart failureL: Improved  PO torsemide BID  Metolzaone 2.5 prn    2. CKD Stage 5  with ARACELI now stable   calcium and phosphorus WNL; continue calcitriol  AGMA due to uremia; continue sodium bicarb 1300 mg BID and titrate upwards as needed  strict I/Os, daily weights  no urgent indication for renal replacement therapy at this time    3. Acute on Chronic Anemia of Chronic Disease  -occult blood positive  -GI consulted; recent EGD/Colonoscopy  -patient with gastritis, ulcerated polyps (s/p resection) and colonic AVMs  -repeat Hb 8.0; but will transfuse 1 unit of PRBC  -no overt signs/symptoms of bleeding  -started on AMY 20,000 units weekly   -iron studies reviewed; started on IV iron  -GI consult appreciated; no acute intervention required. No active evidence of bleeding.    4. Valvular Heart Disease  -TTE reviewed  -cardiology recommendations appreciated    5. VPCs  -hold flecainide per cardiology  -continue close telemonitoring    6. HTN Bp low this morning  Decrease clonidine to OD     DVT ppx - SCDs

## 2019-12-21 NOTE — DISCHARGE NOTE PROVIDER - HOSPITAL COURSE
The patient is an 85 year old male with a history of CKD stage 5, anemia on chronic disease and previous GI bleed status post colonoscopy with two ulcerated polyps s/p resections and colonic AVMs, EGD with gastritis, VPCs who presented from [facility] for complaints of orthopnea and difficulty breathing. Admitted for acute on chronic diastolic CHF secondary to advanced renal disease. Clinically improved with IV lasix infusion. Transitioned to PO torsemide BID with metalozone 3x per week. Nephrology was consulted, patient's family not in favor of HD at this time. Transfused 1 unit of PRBC for acute on chronic anemia. FOBT was positive; GI was consulted, given recent history of EGD and colonoscopy recommended no further intervention at this time. To continue PPI BID and monitor. Patient was taken of flecanide due to risk of proarrythmia while in SR. Cleared by cardiology for discharge home. TO follow up with cardiology, nephrology and PMD in 1 week.         52 mins spent.

## 2019-12-21 NOTE — DISCHARGE NOTE PROVIDER - NSDCCPCAREPLAN_GEN_ALL_CORE_FT
PRINCIPAL DISCHARGE DIAGNOSIS  Diagnosis: Diastolic CHF, acute on chronic  Assessment and Plan of Treatment: PO torsemide increased to 20mg twice a day  to Continue PO metalozone 3 times per week  Follow up with your cardiologist in 1 week      SECONDARY DISCHARGE DIAGNOSES  Diagnosis: Gastritis  Assessment and Plan of Treatment: PO protonix 40mg twice a day for 30 days   Follow up with your PMD and montior for signs and symptoms of bleeding    Diagnosis: Frequent PVCs  Assessment and Plan of Treatment: Flecanide was discontinued   To follow up with cardiology in 1 week    Diagnosis: Hypertension  Assessment and Plan of Treatment: Continue nifedepine and hydralazine  Added clonidine 0.1mg PO Once a day  Montior blood pressure    Diagnosis: Anemia in stage 5 chronic kidney disease, not on chronic dialysis  Assessment and Plan of Treatment: Transfused 1 unit prbc  Continue PO Ferrous sulfate    Diagnosis: CKD (chronic kidney disease) stage 5, GFR less than 15 ml/min  Assessment and Plan of Treatment: Follow up with your nephrologist in 1 week PRINCIPAL DISCHARGE DIAGNOSIS  Diagnosis: Diastolic CHF, acute on chronic  Assessment and Plan of Treatment: PO torsemide increased to 20mg twice a day  to Continue PO metalozone 3 times per week  Follow up with your cardiologist in 1 week      SECONDARY DISCHARGE DIAGNOSES  Diagnosis: Gastritis  Assessment and Plan of Treatment: PO protonix 40mg once a day for 30 days   Follow up with your PMD and montior for signs and symptoms of bleeding    Diagnosis: Frequent PVCs  Assessment and Plan of Treatment: Flecanide was discontinued   To follow up with cardiology in 1 week    Diagnosis: Hypertension  Assessment and Plan of Treatment: Continue nifedepine and hydralazine  Added clonidine 0.1mg PO Once a day  Montior blood pressure    Diagnosis: Anemia in stage 5 chronic kidney disease, not on chronic dialysis  Assessment and Plan of Treatment: Transfused 1 unit prbc  Continue PO Ferrous sulfate    Diagnosis: CKD (chronic kidney disease) stage 5, GFR less than 15 ml/min  Assessment and Plan of Treatment: Follow up with your nephrologist in 1 week

## 2019-12-21 NOTE — PROGRESS NOTE ADULT - SUBJECTIVE AND OBJECTIVE BOX
CC: Follow up    INTERVAL HPI/OVERNIGHT EVENTS: Patient seen and examined, feels better this morning. Denies sob, chest pain or palpitations. Sitting up in a chair.       Vital Signs Last 24 Hrs  T(C): 36.5 (21 Dec 2019 13:45), Max: 36.8 (21 Dec 2019 04:58)  T(F): 97.7 (21 Dec 2019 13:45), Max: 98.3 (21 Dec 2019 04:58)  HR: 60 (21 Dec 2019 13:45) (56 - 70)  BP: 127/51 (21 Dec 2019 13:45) (118/38 - 147/58)  BP(mean): --  RR: 18 (21 Dec 2019 09:51) (18 - 19)  SpO2: 98% (21 Dec 2019 09:51) (97% - 100%)    PHYSICAL EXAM:    GENERAL: NAD, AOX3  HEAD:  Atraumatic, Normocephalic  EYES: EOMI, PERRLA, conjunctiva and sclera clear  ENMT: Moist mucous membranes  NECK: Supple, No JVD  CHEST/LUNG: Clear to auscultation bilaterally; No rales, rhonchi, wheezing, or rubs  HEART: Regular rate and rhythm; No murmurs, rubs, or gallops  ABDOMEN: Soft, Nontender, Nondistended; Bowel sounds present  EXTREMITIES:  2+ Peripheral Pulses, No clubbing, cyanosis, or edema        MEDICATIONS  (STANDING):  ascorbic acid 500 milliGRAM(s) Oral daily  aspirin enteric coated 81 milliGRAM(s) Oral daily  atorvastatin 40 milliGRAM(s) Oral at bedtime  calcitriol   Capsule 0.25 MICROGram(s) Oral daily  cloNIDine 0.1 milliGRAM(s) Oral two times a day  epoetin juan Injectable 56356 Unit(s) SubCutaneous every 7 days  hydrALAZINE 100 milliGRAM(s) Oral every 8 hours  iron sucrose IVPB 200 milliGRAM(s) IV Intermittent every 24 hours  Nephro-pito 1 Tablet(s) Oral daily  NIFEdipine XL 90 milliGRAM(s) Oral daily  NIFEdipine XL 60 milliGRAM(s) Oral at bedtime  pantoprazole    Tablet 40 milliGRAM(s) Oral before breakfast  sodium bicarbonate 1300 milliGRAM(s) Oral two times a day  torsemide 20 milliGRAM(s) Oral two times a day    MEDICATIONS  (PRN):      Allergies    Plavix (Hives)  Toprol-XL (Rash)    Intolerances          LABS:                          8.7    7.47  )-----------( 213      ( 21 Dec 2019 06:59 )             26.5     12-21    142  |  105  |  121.0<H>  ----------------------------<  102  3.5   |  18.0<L>  |  5.72<H>    Ca    9.1      21 Dec 2019 06:59  Phos  4.5     12-21  Mg     2.0     12-21            RADIOLOGY & ADDITIONAL TESTS:

## 2019-12-21 NOTE — DISCHARGE NOTE PROVIDER - NSDCMRMEDTOKEN_GEN_ALL_CORE_FT
atorvastatin 40 mg oral tablet: 1 tab(s) orally once a day (at bedtime)  calcitriol 0.25 mcg oral capsule: 1 cap(s) orally once a day  cloNIDine 0.1 mg oral tablet: 1 tab(s) orally once a day   ferrous sulfate 325 mg (65 mg elemental iron) oral delayed release tablet: 1 tab(s) orally 3 times a day  hydrALAZINE 100 mg oral tablet: 1 tab(s) orally 3 times a day  metOLazone 5 mg oral tablet: 1 tab(s) orally once a day, As Needed  Nephro-Thomas oral tablet: 1 tab(s) orally once a day  NIFEdipine 60 mg oral tablet, extended release: 1 tab(s) orally once a day (at bedtime)  NIFEdipine 90 mg oral tablet, extended release: 1 tab(s) orally once a day  pantoprazole 40 mg oral delayed release tablet: 1 tab(s) orally once a day (before a meal)  torsemide 20 mg oral tablet: 1 tab(s) orally 2 times a day  Vitamin C 250 mg oral tablet: 1 tab(s) orally once a day  Vitamin D3 50,000 intl units oral capsule: 1 cap(s) orally once a week atorvastatin 40 mg oral tablet: 1 tab(s) orally once a day (at bedtime)  calcitriol 0.25 mcg oral capsule: 1 cap(s) orally once a day  cloNIDine 0.1 mg oral tablet: 1 tab(s) orally once a day   ferrous sulfate 325 mg (65 mg elemental iron) oral delayed release tablet: 1 tab(s) orally 3 times a day  hydrALAZINE 100 mg oral tablet: 1 tab(s) orally 3 times a day  metOLazone 5 mg oral tablet: 0.5 tab(s) orally every other day (at bedtime), As Needed  Nephro-Thomas oral tablet: 1 tab(s) orally once a day  NIFEdipine 60 mg oral tablet, extended release: 1 tab(s) orally once a day (at bedtime)  NIFEdipine 90 mg oral tablet, extended release: 1 tab(s) orally once a day  pantoprazole 40 mg oral delayed release tablet: 1 tab(s) orally once a day (before a meal)  torsemide 20 mg oral tablet: 1 tab(s) orally 2 times a day  Vitamin C 250 mg oral tablet: 1 tab(s) orally once a day  Vitamin D3 50,000 intl units oral capsule: 1 cap(s) orally once a week

## 2019-12-21 NOTE — DISCHARGE NOTE PROVIDER - CARE PROVIDERS DIRECT ADDRESSES
,DirectAddress_Unknown,[email or web address]

## 2019-12-22 ENCOUNTER — TRANSCRIPTION ENCOUNTER (OUTPATIENT)
Age: 84
End: 2019-12-22

## 2019-12-22 VITALS
OXYGEN SATURATION: 99 % | HEART RATE: 65 BPM | RESPIRATION RATE: 19 BRPM | SYSTOLIC BLOOD PRESSURE: 118 MMHG | DIASTOLIC BLOOD PRESSURE: 52 MMHG

## 2019-12-22 LAB
ANION GAP SERPL CALC-SCNC: 19 MMOL/L — HIGH (ref 5–17)
BUN SERPL-MCNC: 110 MG/DL — HIGH (ref 8–20)
CALCIUM SERPL-MCNC: 8.8 MG/DL — SIGNIFICANT CHANGE UP (ref 8.6–10.2)
CHLORIDE SERPL-SCNC: 102 MMOL/L — SIGNIFICANT CHANGE UP (ref 98–107)
CO2 SERPL-SCNC: 19 MMOL/L — LOW (ref 22–29)
CREAT SERPL-MCNC: 6.24 MG/DL — HIGH (ref 0.5–1.3)
GLUCOSE SERPL-MCNC: 107 MG/DL — SIGNIFICANT CHANGE UP (ref 70–115)
HCT VFR BLD CALC: 25.6 % — LOW (ref 39–50)
HGB BLD-MCNC: 8.3 G/DL — LOW (ref 13–17)
MCHC RBC-ENTMCNC: 30 PG — SIGNIFICANT CHANGE UP (ref 27–34)
MCHC RBC-ENTMCNC: 32.4 GM/DL — SIGNIFICANT CHANGE UP (ref 32–36)
MCV RBC AUTO: 92.4 FL — SIGNIFICANT CHANGE UP (ref 80–100)
PLATELET # BLD AUTO: 222 K/UL — SIGNIFICANT CHANGE UP (ref 150–400)
POTASSIUM SERPL-MCNC: 3.2 MMOL/L — LOW (ref 3.5–5.3)
POTASSIUM SERPL-SCNC: 3.2 MMOL/L — LOW (ref 3.5–5.3)
RBC # BLD: 2.77 M/UL — LOW (ref 4.2–5.8)
RBC # FLD: 14.8 % — HIGH (ref 10.3–14.5)
SODIUM SERPL-SCNC: 140 MMOL/L — SIGNIFICANT CHANGE UP (ref 135–145)
WBC # BLD: 7.86 K/UL — SIGNIFICANT CHANGE UP (ref 3.8–10.5)
WBC # FLD AUTO: 7.86 K/UL — SIGNIFICANT CHANGE UP (ref 3.8–10.5)

## 2019-12-22 PROCEDURE — 85730 THROMBOPLASTIN TIME PARTIAL: CPT

## 2019-12-22 PROCEDURE — 80053 COMPREHEN METABOLIC PANEL: CPT

## 2019-12-22 PROCEDURE — 85027 COMPLETE CBC AUTOMATED: CPT

## 2019-12-22 PROCEDURE — 93306 TTE W/DOPPLER COMPLETE: CPT

## 2019-12-22 PROCEDURE — 83540 ASSAY OF IRON: CPT

## 2019-12-22 PROCEDURE — P9016: CPT

## 2019-12-22 PROCEDURE — 83550 IRON BINDING TEST: CPT

## 2019-12-22 PROCEDURE — 86900 BLOOD TYPING SEROLOGIC ABO: CPT

## 2019-12-22 PROCEDURE — 84100 ASSAY OF PHOSPHORUS: CPT

## 2019-12-22 PROCEDURE — 86923 COMPATIBILITY TEST ELECTRIC: CPT

## 2019-12-22 PROCEDURE — 85018 HEMOGLOBIN: CPT

## 2019-12-22 PROCEDURE — 83880 ASSAY OF NATRIURETIC PEPTIDE: CPT

## 2019-12-22 PROCEDURE — 36415 COLL VENOUS BLD VENIPUNCTURE: CPT

## 2019-12-22 PROCEDURE — 97530 THERAPEUTIC ACTIVITIES: CPT

## 2019-12-22 PROCEDURE — 96374 THER/PROPH/DIAG INJ IV PUSH: CPT

## 2019-12-22 PROCEDURE — 84484 ASSAY OF TROPONIN QUANT: CPT

## 2019-12-22 PROCEDURE — 97116 GAIT TRAINING THERAPY: CPT

## 2019-12-22 PROCEDURE — 84466 ASSAY OF TRANSFERRIN: CPT

## 2019-12-22 PROCEDURE — 86901 BLOOD TYPING SEROLOGIC RH(D): CPT

## 2019-12-22 PROCEDURE — 96375 TX/PRO/DX INJ NEW DRUG ADDON: CPT

## 2019-12-22 PROCEDURE — 93005 ELECTROCARDIOGRAM TRACING: CPT

## 2019-12-22 PROCEDURE — 71045 X-RAY EXAM CHEST 1 VIEW: CPT

## 2019-12-22 PROCEDURE — 99285 EMERGENCY DEPT VISIT HI MDM: CPT | Mod: 25

## 2019-12-22 PROCEDURE — 99239 HOSP IP/OBS DSCHRG MGMT >30: CPT

## 2019-12-22 PROCEDURE — 97110 THERAPEUTIC EXERCISES: CPT

## 2019-12-22 PROCEDURE — 85014 HEMATOCRIT: CPT

## 2019-12-22 PROCEDURE — 80048 BASIC METABOLIC PNL TOTAL CA: CPT

## 2019-12-22 PROCEDURE — 83735 ASSAY OF MAGNESIUM: CPT

## 2019-12-22 PROCEDURE — 86850 RBC ANTIBODY SCREEN: CPT

## 2019-12-22 PROCEDURE — 85610 PROTHROMBIN TIME: CPT

## 2019-12-22 PROCEDURE — 82272 OCCULT BLD FECES 1-3 TESTS: CPT

## 2019-12-22 PROCEDURE — 36430 TRANSFUSION BLD/BLD COMPNT: CPT

## 2019-12-22 PROCEDURE — 82728 ASSAY OF FERRITIN: CPT

## 2019-12-22 RX ORDER — POTASSIUM CHLORIDE 20 MEQ
20 PACKET (EA) ORAL ONCE
Refills: 0 | Status: COMPLETED | OUTPATIENT
Start: 2019-12-22 | End: 2019-12-22

## 2019-12-22 RX ORDER — DARBEPOETIN ALFA IN POLYSORBAT 200MCG/0.4
25 PEN INJECTOR (ML) SUBCUTANEOUS ONCE
Refills: 0 | Status: COMPLETED | OUTPATIENT
Start: 2019-12-22 | End: 2019-12-22

## 2019-12-22 RX ADMIN — Medication 25 MICROGRAM(S): at 10:43

## 2019-12-22 RX ADMIN — IRON SUCROSE 110 MILLIGRAM(S): 20 INJECTION, SOLUTION INTRAVENOUS at 05:06

## 2019-12-22 RX ADMIN — Medication 20 MILLIEQUIVALENT(S): at 10:42

## 2019-12-22 RX ADMIN — Medication 1300 MILLIGRAM(S): at 05:07

## 2019-12-22 RX ADMIN — Medication 100 MILLIGRAM(S): at 05:07

## 2019-12-22 RX ADMIN — Medication 90 MILLIGRAM(S): at 05:07

## 2019-12-22 RX ADMIN — PANTOPRAZOLE SODIUM 40 MILLIGRAM(S): 20 TABLET, DELAYED RELEASE ORAL at 05:07

## 2019-12-22 RX ADMIN — Medication 20 MILLIGRAM(S): at 05:07

## 2019-12-22 RX ADMIN — Medication 0.1 MILLIGRAM(S): at 05:07

## 2019-12-22 NOTE — DISCHARGE NOTE NURSING/CASE MANAGEMENT/SOCIAL WORK - PATIENT PORTAL LINK FT
You can access the FollowMyHealth Patient Portal offered by [facility] by registering at the following website: [email or web address]. By joining [employer]’s FollowMyHealth portal, you will also be able to view your health information using other applications (apps) compatible with our system.

## 2019-12-22 NOTE — PROGRESS NOTE ADULT - ASSESSMENT
The patient is an 85 year old male with a history of CKD stage 5, anemia on chronic disease and previous GI bleed status post colonoscopy with two ulcerated polyps s/p resections and colonic AVMs, EGD with gastritis, VPCs who presented from [facility] for complaints of orthopnea and difficulty breathing. Admitted for acute on chronic diastolic CHF secondary to advanced renal disease. Clinically improved with IV lasix infusion. Transitioned to PO torsemide BID with metolazone 3x per week. Nephrology was consulted, patient's family not in favor of HD at this time. Transfused 1 unit of PRBC for acute on chronic anemia. FOBT was positive; GI was consulted, given recent history of EGD and colonoscopy recommended no further intervention at this time. To continue PPI BID and monitor. Patient was taken of flecainide due to risk of proarrhythmia while in SR.     Assessment/Plan:    1. Volume overload in the setting of advanced renal disease and acute on chronic diastolic heart failure: Improved  PO torsemide BID  Metolzaone 2.5 prn every other day     2. CKD Stage 5  with ARACELI now stable   calcium and phosphorus WNL; continue calcitriol  strict I/Os, daily weights  no urgent indication for renal replacement therapy at this time    3. Acute on Chronic Anemia of Chronic Disease  -occult blood positive  -GI consulted; recent EGD/Colonoscopy  -patient with gastritis, ulcerated polyps (s/p resection) and colonic AVMs  -repeat Hb 8.0; but will transfuse 1 unit of PRBC  -no overt signs/symptoms of bleeding  -to continue aranesp as outpatient   -iron studies reviewed; started on IV iron  -GI consult appreciated; no acute intervention required. No active evidence of bleeding.    4. Valvular Heart Disease  -TTE reviewed  -cardiology recommendations appreciated    5. VPCs  -hold flecainide per cardiology  - No events on telemetry overnight     6. HTN Bp stable-Decrease clonidine to OD     DVT ppx - SCDs

## 2019-12-22 NOTE — PROGRESS NOTE ADULT - SUBJECTIVE AND OBJECTIVE BOX
CC: Follow up    INTERVAL HPI/OVERNIGHT EVENTS: Patient seen and examined, sitting up in a chair. Denies chest pain, sob or palpitations. Denies sob, orthopnea or PND. Requesting to go home.       Vital Signs Last 24 Hrs  T(C): 36.4 (22 Dec 2019 10:08), Max: 36.7 (22 Dec 2019 05:02)  T(F): 97.6 (22 Dec 2019 10:08), Max: 98 (22 Dec 2019 05:02)  HR: 65 (22 Dec 2019 11:00) (59 - 73)  BP: 118/52 (22 Dec 2019 11:00) (118/52 - 142/62)  BP(mean): --  RR: 19 (22 Dec 2019 11:00) (18 - 19)  SpO2: 99% (22 Dec 2019 11:00) (95% - 99%)    PHYSICAL EXAM:    GENERAL: NAD, AOX3  HEAD:  Atraumatic, Normocephalic  EYES: EOMI, PERRLA, conjunctiva and sclera clear  ENMT: Moist mucous membranes  CHEST/LUNG: Clear to auscultation bilaterally; No rales, rhonchi, wheezing, or rubs  HEART: Regular rate and rhythm; No murmurs, rubs, or gallops  ABDOMEN: Soft, Nontender, Nondistended; Bowel sounds present  EXTREMITIES:  2+ Peripheral Pulses, No clubbing, cyanosis, or edema        MEDICATIONS  (STANDING):  ascorbic acid 500 milliGRAM(s) Oral daily  aspirin enteric coated 81 milliGRAM(s) Oral daily  atorvastatin 40 milliGRAM(s) Oral at bedtime  calcitriol   Capsule 0.25 MICROGram(s) Oral daily  cloNIDine 0.1 milliGRAM(s) Oral daily  hydrALAZINE 100 milliGRAM(s) Oral every 8 hours  iron sucrose IVPB 200 milliGRAM(s) IV Intermittent every 24 hours  Nephro-pito 1 Tablet(s) Oral daily  NIFEdipine XL 90 milliGRAM(s) Oral daily  NIFEdipine XL 60 milliGRAM(s) Oral at bedtime  pantoprazole    Tablet 40 milliGRAM(s) Oral before breakfast  sodium bicarbonate 1300 milliGRAM(s) Oral two times a day  torsemide 20 milliGRAM(s) Oral two times a day    MEDICATIONS  (PRN):      Allergies    Plavix (Hives)  Toprol-XL (Rash)    Intolerances          LABS:                          8.3    7.86  )-----------( 222      ( 22 Dec 2019 06:17 )             25.6     12-22    140  |  102  |  110.0<H>  ----------------------------<  107  3.2<L>   |  19.0<L>  |  6.24<H>    Ca    8.8      22 Dec 2019 06:17  Phos  4.5     12-21  Mg     2.0     12-21            RADIOLOGY & ADDITIONAL TESTS:

## 2019-12-31 ENCOUNTER — APPOINTMENT (OUTPATIENT)
Dept: NEPHROLOGY | Facility: CLINIC | Age: 84
End: 2019-12-31
Payer: MEDICARE

## 2019-12-31 VITALS
SYSTOLIC BLOOD PRESSURE: 127 MMHG | WEIGHT: 148 LBS | BODY MASS INDEX: 24.66 KG/M2 | DIASTOLIC BLOOD PRESSURE: 54 MMHG | HEIGHT: 65 IN | HEART RATE: 73 BPM

## 2019-12-31 DIAGNOSIS — Z80.9 FAMILY HISTORY OF MALIGNANT NEOPLASM, UNSPECIFIED: ICD-10-CM

## 2019-12-31 PROCEDURE — 99215 OFFICE O/P EST HI 40 MIN: CPT | Mod: 25

## 2019-12-31 PROCEDURE — 36415 COLL VENOUS BLD VENIPUNCTURE: CPT

## 2019-12-31 NOTE — ASSESSMENT
[FreeTextEntry1] : 85 year old man with advanced CKD with LUE AVF; family has been refusing dialysis\par Pt with recent hospitalization for CHF exacerbation presenting for follow up\par Pt currently appears euvolemic; likely over diuresed as suggested by symptoms\par Recommend to decrease Torsemide to 20 mg daily; cut Metolazone to 2.5 mg once a week\par Pt instructed to check weight daily and increase dose to 20 mg bid if weights uptrend/  development of leg edema or dyspnea.\par Repeat renal panel and CBC today; K+ was 3.2 on discharge.\par BP stable; continue current regimen\par Instructed to take bicarb dose to 650 mg tid. Spoke with pharmacy; drug doesn’t come in 1300 mg dosage as per conversation with pharmacy.\par Last phos level stable at 4.5 on Sevelamer 800 ; continue same dose for now.\par Opthalmology follow up .\par Hematology follow up for Aranesp.\par RTC in 1 month.\par \par

## 2019-12-31 NOTE — REVIEW OF SYSTEMS
[Feeling Tired] : feeling tired [Recent Weight Loss (___ Lbs)] : recent [unfilled] ~Ulb weight loss [Constipation] : constipation [Fever] : no fever [Chills] : no chills [Eye Pain] : no eye pain [Earache] : no earache [Nosebleeds] : no nosebleeds [Nasal Discharge] : no nasal discharge [Sore Throat] : no sore throat [Hoarseness] : no hoarseness [Heart Rate Is Slow] : the heart rate was not slow [Heart Rate Is Fast] : the heart rate was not fast [Chest Pain] : no chest pain [Palpitations] : no palpitations [Lower Ext Edema] : no extremity edema [Shortness Of Breath] : no shortness of breath [Cough] : no cough [SOB on Exertion] : no shortness of breath during exertion [Orthopnea] : no orthopnea [Abdominal Pain] : no abdominal pain [Diarrhea] : no diarrhea [Heartburn] : no heartburn [Melena] : no melena [Dysuria] : no dysuria [Incontinence] : no incontinence [Hesitancy] : no urinary hesitancy [Arthralgias] : no arthralgias [Joint Pain] : no joint pain [Joint Swelling] : no joint swelling [Skin Lesions] : no skin lesions [Joint Stiffness] : no joint stiffness [Itching] : no itching [Dizziness] : no dizziness [Fainting] : no fainting [Limb Weakness] : no limb weakness [Difficulty Walking] : no difficulty walking [Anxiety] : no anxiety [Depression] : no depression [Easy Bleeding] : no tendency for easy bleeding [Easy Bruising] : no tendency for easy bruising

## 2019-12-31 NOTE — PHYSICAL EXAM
[General Appearance - Alert] : alert [Strabismus] : no strabismus was seen [Outer Ear] : the ears and nose were normal in appearance [Neck Appearance] : the appearance of the neck was normal [Auscultation Breath Sounds / Voice Sounds] : lungs were clear to auscultation bilaterally [Bowel Sounds] : normal bowel sounds [Edema] : there was no peripheral edema [Heart Sounds] : normal S1 and S2 [Abdomen Tenderness] : non-tender [Cervical Lymph Nodes Enlarged Posterior Bilaterally] : posterior cervical [Cervical Lymph Nodes Enlarged Anterior Bilaterally] : anterior cervical [Nail Clubbing] : no clubbing  or cyanosis of the fingernails [] : no rash [___ (cm) Fistula] : [unfilled] (cm) fistula [No Focal Deficits] : no focal deficits [FreeTextEntry1] : dry mucus membranes

## 2019-12-31 NOTE — HISTORY OF PRESENT ILLNESS
[FreeTextEntry1] : 85 year old man with CKD stage V presenting for followup after hospital admission for CHF exacerbation. Pt was discharged from the hospital on 12/22.\par Pt is here today with his daughters Vanessa and Angélica Solorzano.\par Pt states he feels well. He  saw Dr. Peterson last Friday. He has been off Flecainide without any issues. BP regimen was changed to Hydralazine 50 mg tid (in concern of ? lupus as per daughter) . He is on clonidine 0.1 mg daily. \par Hb was 8.6 on discharge, got Aranesp during hospitalization. He is scheduled to see Dr. Baker on Thursday.\par Pt is on Torsemide 40 mg daily; taking Metolazone 5 mg every Wednesday; last dose was on Christmas.\par Pt has been weighing himself every day; his weight when he went to the hospital was 160 lbs and has decreased to 148 lately. \par Pt states he feels 'dry'. Also c/o 'leg cramps and Rakan horses'.\par Urination has unchanged\par  Feels tired but denies dyspnea, orthopnea, leg edema.\par Walks around the house and goes up and down the stairs.\par He has been eating three meals a day and a midafternoon snack.\par He has also been  eating bananas occasionally for low K+.\par Denies nausea, vomiting, diarrhea, metallic taste in mouth. Reports constipation. \par Complaining of blurry vision.\par Asking if sodium bicarb dose can be decreased; he is supposed to take 650 mg (2 tabs tid) and is not happy with the pill burden.\par Also supposed to take Sevelamer 2 tabs tid with meals but he is taking 1 pill. \par

## 2020-01-01 LAB
ALBUMIN SERPL ELPH-MCNC: 4.3 G/DL
ANION GAP SERPL CALC-SCNC: 23 MMOL/L
BASOPHILS # BLD AUTO: 0.07 K/UL
BASOPHILS NFR BLD AUTO: 0.8 %
BUN SERPL-MCNC: 119 MG/DL
CALCIUM SERPL-MCNC: 10 MG/DL
CHLORIDE SERPL-SCNC: 98 MMOL/L
CO2 SERPL-SCNC: 19 MMOL/L
CREAT SERPL-MCNC: 6.24 MG/DL
EOSINOPHIL # BLD AUTO: 0.14 K/UL
EOSINOPHIL NFR BLD AUTO: 1.6 %
GLUCOSE SERPL-MCNC: 148 MG/DL
HCT VFR BLD CALC: 38.6 %
HGB BLD-MCNC: 12 G/DL
IMM GRANULOCYTES NFR BLD AUTO: 0.3 %
LYMPHOCYTES # BLD AUTO: 0.82 K/UL
LYMPHOCYTES NFR BLD AUTO: 9.1 %
MAN DIFF?: NORMAL
MCHC RBC-ENTMCNC: 30.7 PG
MCHC RBC-ENTMCNC: 31.1 GM/DL
MCV RBC AUTO: 98.7 FL
MONOCYTES # BLD AUTO: 0.72 K/UL
MONOCYTES NFR BLD AUTO: 8 %
NEUTROPHILS # BLD AUTO: 7.21 K/UL
NEUTROPHILS NFR BLD AUTO: 80.2 %
PHOSPHATE SERPL-MCNC: 5.1 MG/DL
PLATELET # BLD AUTO: 199 K/UL
POTASSIUM SERPL-SCNC: 4.5 MMOL/L
RBC # BLD: 3.91 M/UL
RBC # FLD: 18.2 %
SODIUM SERPL-SCNC: 140 MMOL/L
WBC # FLD AUTO: 8.99 K/UL

## 2020-01-02 ENCOUNTER — APPOINTMENT (OUTPATIENT)
Age: 85
End: 2020-01-02

## 2020-01-02 ENCOUNTER — RESULT REVIEW (OUTPATIENT)
Age: 85
End: 2020-01-02

## 2020-01-02 ENCOUNTER — APPOINTMENT (OUTPATIENT)
Dept: HEMATOLOGY ONCOLOGY | Facility: CLINIC | Age: 85
End: 2020-01-02
Payer: MEDICARE

## 2020-01-02 LAB
BASOPHILS # BLD AUTO: 0.1 K/UL — SIGNIFICANT CHANGE UP (ref 0–0.2)
BASOPHILS NFR BLD AUTO: 1.4 % — SIGNIFICANT CHANGE UP (ref 0–2)
EOSINOPHIL # BLD AUTO: 0.1 K/UL — SIGNIFICANT CHANGE UP (ref 0–0.5)
EOSINOPHIL NFR BLD AUTO: 1.7 % — SIGNIFICANT CHANGE UP (ref 0–6)
HCT VFR BLD CALC: 35.8 % — LOW (ref 39–50)
HGB BLD-MCNC: 11.1 G/DL — LOW (ref 13–17)
LYMPHOCYTES # BLD AUTO: 0.8 K/UL — LOW (ref 1–3.3)
LYMPHOCYTES # BLD AUTO: 12.8 % — LOW (ref 13–44)
MCHC RBC-ENTMCNC: 29.7 PG — SIGNIFICANT CHANGE UP (ref 27–34)
MCHC RBC-ENTMCNC: 31.1 G/DL — LOW (ref 32–36)
MCV RBC AUTO: 95.2 FL — SIGNIFICANT CHANGE UP (ref 80–100)
MONOCYTES # BLD AUTO: 0.6 K/UL — SIGNIFICANT CHANGE UP (ref 0–0.9)
MONOCYTES NFR BLD AUTO: 8.8 % — SIGNIFICANT CHANGE UP (ref 2–14)
NEUTROPHILS # BLD AUTO: 4.8 K/UL — SIGNIFICANT CHANGE UP (ref 1.8–7.4)
NEUTROPHILS NFR BLD AUTO: 75.2 % — SIGNIFICANT CHANGE UP (ref 43–77)
PLATELET # BLD AUTO: 151 K/UL — SIGNIFICANT CHANGE UP (ref 150–400)
RBC # BLD: 3.76 M/UL — LOW (ref 4.2–5.8)
RBC # FLD: 16.6 % — HIGH (ref 10.3–14.5)
WBC # BLD: 6.4 K/UL — SIGNIFICANT CHANGE UP (ref 3.8–10.5)
WBC # FLD AUTO: 6.4 K/UL — SIGNIFICANT CHANGE UP (ref 3.8–10.5)

## 2020-01-02 PROCEDURE — 99213 OFFICE O/P EST LOW 20 MIN: CPT

## 2020-01-02 NOTE — HISTORY OF PRESENT ILLNESS
[de-identified] : Recently in the hospital for worsening anemia, was transfused, and GI work up revealed multiple bleeding polyps, which were removed, he is feeling better, still on po iron [de-identified] : Mr. King is a 84 yo WM with a long history of renal, disease, currently Stage 5, who has been treated conservatively, no dialysis, but who has renal related anemia, has been on procrit, but HGb has been ranging between 8.6 - 11. Was switched to Aranesp.

## 2020-01-02 NOTE — ASSESSMENT
[FreeTextEntry1] : Anemia in stage 4 - 5 chronic kidney disease . Recently found to have multiple bleeding GI polyps, which were removed. He did received a transfusion , while in the hospital, His HGB today is 11.1, Parameter say to give aranesp for HGB < 11, so Aranesp is being held today. Daughter is concerned about not receiving Aranesp today. Discussed parameters, indications and concerns about increasing HGb too high ( MI or CVA). We agreed to repeat CBC in 1 week to assess need for aranesp.

## 2020-01-06 ENCOUNTER — APPOINTMENT (OUTPATIENT)
Dept: INTERNAL MEDICINE | Facility: CLINIC | Age: 85
End: 2020-01-06

## 2020-01-09 ENCOUNTER — APPOINTMENT (OUTPATIENT)
Age: 85
End: 2020-01-09

## 2020-01-09 ENCOUNTER — RESULT REVIEW (OUTPATIENT)
Age: 85
End: 2020-01-09

## 2020-01-09 ENCOUNTER — APPOINTMENT (OUTPATIENT)
Dept: HEMATOLOGY ONCOLOGY | Facility: CLINIC | Age: 85
End: 2020-01-09
Payer: MEDICARE

## 2020-01-09 LAB
BASOPHILS # BLD AUTO: 0.1 K/UL — SIGNIFICANT CHANGE UP (ref 0–0.2)
BASOPHILS NFR BLD AUTO: 1.6 % — SIGNIFICANT CHANGE UP (ref 0–2)
EOSINOPHIL # BLD AUTO: 0.1 K/UL — SIGNIFICANT CHANGE UP (ref 0–0.5)
EOSINOPHIL NFR BLD AUTO: 1.6 % — SIGNIFICANT CHANGE UP (ref 0–6)
HCT VFR BLD CALC: 33.2 % — LOW (ref 39–50)
HGB BLD-MCNC: 10.7 G/DL — LOW (ref 13–17)
LYMPHOCYTES # BLD AUTO: 0.5 K/UL — LOW (ref 1–3.3)
LYMPHOCYTES # BLD AUTO: 7.6 % — LOW (ref 13–44)
MCHC RBC-ENTMCNC: 30 PG — SIGNIFICANT CHANGE UP (ref 27–34)
MCHC RBC-ENTMCNC: 32.3 G/DL — SIGNIFICANT CHANGE UP (ref 32–36)
MCV RBC AUTO: 92.7 FL — SIGNIFICANT CHANGE UP (ref 80–100)
MONOCYTES # BLD AUTO: 0.5 K/UL — SIGNIFICANT CHANGE UP (ref 0–0.9)
MONOCYTES NFR BLD AUTO: 7.7 % — SIGNIFICANT CHANGE UP (ref 2–14)
NEUTROPHILS # BLD AUTO: 5.6 K/UL — SIGNIFICANT CHANGE UP (ref 1.8–7.4)
NEUTROPHILS NFR BLD AUTO: 81.4 % — HIGH (ref 43–77)
PLATELET # BLD AUTO: 172 K/UL — SIGNIFICANT CHANGE UP (ref 150–400)
RBC # BLD: 3.58 M/UL — LOW (ref 4.2–5.8)
RBC # FLD: 15.3 % — HIGH (ref 10.3–14.5)
WBC # BLD: 6.9 K/UL — SIGNIFICANT CHANGE UP (ref 3.8–10.5)
WBC # FLD AUTO: 6.9 K/UL — SIGNIFICANT CHANGE UP (ref 3.8–10.5)

## 2020-01-09 PROCEDURE — 99212 OFFICE O/P EST SF 10 MIN: CPT

## 2020-01-09 NOTE — HISTORY OF PRESENT ILLNESS
[de-identified] : patient in for CBC check, aranesp held last week, HGB was 11 gms. He was in the hospital, found to have GI bleeding and was transfused. [de-identified] : Mr. King is a 86 yo WM with a long history of renal, disease, currently Stage 5, who has been treated conservatively, no dialysis, but who has renal related anemia, has been on procrit, but HGb has been ranging between 8.6 - 11. Was switched to Aranesp. \par

## 2020-01-09 NOTE — ASSESSMENT
[FreeTextEntry1] : Anemia in stage 4 - 5 chronic kidney disease. Recently found to have multiple bleeding GI polyps, which were removed. He did received a transfusion , while in the hospital, His HGB today is 10.7 and will resume the Aranesp every 2 weeks, will continue to follow CBC.

## 2020-01-21 ENCOUNTER — OUTPATIENT (OUTPATIENT)
Dept: OUTPATIENT SERVICES | Facility: HOSPITAL | Age: 85
LOS: 1 days | Discharge: HOME | End: 2020-01-21

## 2020-01-21 DIAGNOSIS — Z98.890 OTHER SPECIFIED POSTPROCEDURAL STATES: Chronic | ICD-10-CM

## 2020-01-21 DIAGNOSIS — D63.1 ANEMIA IN CHRONIC KIDNEY DISEASE: ICD-10-CM

## 2020-01-21 DIAGNOSIS — Z98.62 PERIPHERAL VASCULAR ANGIOPLASTY STATUS: Chronic | ICD-10-CM

## 2020-01-21 DIAGNOSIS — I77.0 ARTERIOVENOUS FISTULA, ACQUIRED: Chronic | ICD-10-CM

## 2020-01-21 DIAGNOSIS — N18.5 CHRONIC KIDNEY DISEASE, STAGE 5: ICD-10-CM

## 2020-01-23 ENCOUNTER — APPOINTMENT (OUTPATIENT)
Dept: HEMATOLOGY ONCOLOGY | Facility: CLINIC | Age: 85
End: 2020-01-23

## 2020-01-23 ENCOUNTER — RESULT REVIEW (OUTPATIENT)
Age: 85
End: 2020-01-23

## 2020-01-23 ENCOUNTER — APPOINTMENT (OUTPATIENT)
Age: 85
End: 2020-01-23

## 2020-01-23 LAB
BASOPHILS # BLD AUTO: 0.1 K/UL — SIGNIFICANT CHANGE UP (ref 0–0.2)
BASOPHILS NFR BLD AUTO: 1.8 % — SIGNIFICANT CHANGE UP (ref 0–2)
EOSINOPHIL # BLD AUTO: 0.1 K/UL — SIGNIFICANT CHANGE UP (ref 0–0.5)
EOSINOPHIL NFR BLD AUTO: 1.8 % — SIGNIFICANT CHANGE UP (ref 0–6)
HCT VFR BLD CALC: 35.1 % — LOW (ref 39–50)
HGB BLD-MCNC: 11.4 G/DL — LOW (ref 13–17)
LYMPHOCYTES # BLD AUTO: 1 K/UL — SIGNIFICANT CHANGE UP (ref 1–3.3)
LYMPHOCYTES # BLD AUTO: 12.4 % — LOW (ref 13–44)
MCHC RBC-ENTMCNC: 30.5 PG — SIGNIFICANT CHANGE UP (ref 27–34)
MCHC RBC-ENTMCNC: 32.3 G/DL — SIGNIFICANT CHANGE UP (ref 32–36)
MCV RBC AUTO: 94.3 FL — SIGNIFICANT CHANGE UP (ref 80–100)
MONOCYTES # BLD AUTO: 0.7 K/UL — SIGNIFICANT CHANGE UP (ref 0–0.9)
MONOCYTES NFR BLD AUTO: 9.1 % — SIGNIFICANT CHANGE UP (ref 2–14)
NEUTROPHILS # BLD AUTO: 5.7 K/UL — SIGNIFICANT CHANGE UP (ref 1.8–7.4)
NEUTROPHILS NFR BLD AUTO: 74.9 % — SIGNIFICANT CHANGE UP (ref 43–77)
PLATELET # BLD AUTO: 216 K/UL — SIGNIFICANT CHANGE UP (ref 150–400)
RBC # BLD: 3.72 M/UL — LOW (ref 4.2–5.8)
RBC # FLD: 15.8 % — HIGH (ref 10.3–14.5)
WBC # BLD: 7.7 K/UL — SIGNIFICANT CHANGE UP (ref 3.8–10.5)
WBC # FLD AUTO: 7.7 K/UL — SIGNIFICANT CHANGE UP (ref 3.8–10.5)

## 2020-01-28 ENCOUNTER — APPOINTMENT (OUTPATIENT)
Dept: NEPHROLOGY | Facility: CLINIC | Age: 85
End: 2020-01-28
Payer: MEDICARE

## 2020-01-28 VITALS
SYSTOLIC BLOOD PRESSURE: 148 MMHG | DIASTOLIC BLOOD PRESSURE: 60 MMHG | HEART RATE: 80 BPM | HEIGHT: 65 IN | BODY MASS INDEX: 25.16 KG/M2 | WEIGHT: 151 LBS

## 2020-01-28 DIAGNOSIS — N18.9 CHRONIC KIDNEY DISEASE, UNSPECIFIED: ICD-10-CM

## 2020-01-28 DIAGNOSIS — E83.39 OTHER DISORDERS OF PHOSPHORUS METABOLISM: ICD-10-CM

## 2020-01-28 DIAGNOSIS — Z87.898 PERSONAL HISTORY OF OTHER SPECIFIED CONDITIONS: ICD-10-CM

## 2020-01-28 PROCEDURE — 99215 OFFICE O/P EST HI 40 MIN: CPT

## 2020-01-28 NOTE — REVIEW OF SYSTEMS
[Fever] : no fever [Feeling Tired] : not feeling tired [Chills] : no chills [Recent Weight Loss (___ Lbs)] : no recent weight loss [Earache] : no earache [Eye Pain] : no eye pain [Nosebleeds] : no nosebleeds [Sore Throat] : no sore throat [Nasal Discharge] : no nasal discharge [Hoarseness] : no hoarseness [Heart Rate Is Fast] : the heart rate was not fast [Heart Rate Is Slow] : the heart rate was not slow [Palpitations] : no palpitations [Lower Ext Edema] : no extremity edema [Chest Pain] : no chest pain [SOB on Exertion] : no shortness of breath during exertion [Cough] : no cough [Shortness Of Breath] : no shortness of breath [Constipation] : no constipation [Orthopnea] : no orthopnea [Abdominal Pain] : no abdominal pain [Diarrhea] : no diarrhea [Dysuria] : no dysuria [Incontinence] : no incontinence [Hesitancy] : no urinary hesitancy [Arthralgias] : no arthralgias [Joint Pain] : no joint pain [Joint Stiffness] : no joint stiffness [Joint Swelling] : no joint swelling [Skin Lesions] : no skin lesions [Dizziness] : no dizziness [Fainting] : no fainting [Itching] : no itching [Difficulty Walking] : no difficulty walking [Limb Weakness] : no limb weakness [Easy Bleeding] : no tendency for easy bleeding [Depression] : no depression [Anxiety] : no anxiety [Easy Bruising] : no tendency for easy bruising

## 2020-01-28 NOTE — ASSESSMENT
[FreeTextEntry1] : CKD V\par Advanced CKD with LUE AVF\par Family has been refusing dialysis\par No uremic signs/ symptoms\par \par HTN/Volume\par BP stable\par Pt euvolemic\par Continue Torsemide 20 mg  qd and Metolazone 2.5 mg once a week\par Advised to check daily weights\par \par Metabolic acidosis\par Continue sodium bicarb 650 mg tid\par \par CKD- MBD\par Last phos level stable at 4.5 on Sevelamer 800 ; continue same dose for now.\par \par Anemia\par Hb at goal\par Follow up with Hematology \par \par RTC in 1 month.\par \par

## 2020-01-28 NOTE — PHYSICAL EXAM
[General Appearance - Alert] : alert [Strabismus] : no strabismus was seen [Outer Ear] : the ears and nose were normal in appearance [Neck Appearance] : the appearance of the neck was normal [Auscultation Breath Sounds / Voice Sounds] : lungs were clear to auscultation bilaterally [Heart Sounds] : normal S1 and S2 [Edema] : there was no peripheral edema [Bowel Sounds] : normal bowel sounds [Abdomen Tenderness] : non-tender [Nail Clubbing] : no clubbing  or cyanosis of the fingernails [___ (cm) Fistula] : [unfilled] (cm) fistula [] : no rash [No Focal Deficits] : no focal deficits [Examination Of The Oral Cavity] : the lips and gums were normal [FreeTextEntry1] : thin, elderly man [No CVA Tenderness] : no ~M costovertebral angle tenderness [Impaired Insight] : insight and judgment were intact [Oriented To Time, Place, And Person] : oriented to person, place, and time [Affect] : the affect was normal

## 2020-01-28 NOTE — HISTORY OF PRESENT ILLNESS
[FreeTextEntry1] : 85 year old man with CKD stage V presenting for followup after hospital admission for CHF exacerbation. Pt was discharged from the hospital on 12/22.\par Pt is here today with his daughters Vanessa and Angélica Solorzano.\par Pt states he feels well. He  saw Dr. Peterson last Friday. He has been off Flecainide without any issues. BP regimen was changed to Hydralazine 50 mg tid (in concern of ? lupus as per daughter) . He is on clonidine 0.1 mg daily. \par Hb was 8.6 on discharge, got Aranesp during hospitalization. He is scheduled to see Dr. Worthington on Thursday.\par Pt is on Torsemide 40 mg daily; taking Metolazone 5 mg every Wednesday; last dose was on Christmas.\par Pt has been weighing himself every day; his weight when he went to the hospital was 160 lbs and has decreased to 148 lately. \par Pt states he feels 'dry'. Also c/o 'leg cramps and Rakan horses'.\par Urination has unchanged\par  Feels tired but denies dyspnea, orthopnea, leg edema.\par Walks around the house and goes up and down the stairs.\par He has been eating three meals a day and a midafternoon snack.\par He has also been  eating bananas occasionally for low K+.\par Denies nausea, vomiting, diarrhea, metallic taste in mouth. Reports constipation. \par Complaining of blurry vision.\par Asking if sodium bicarb dose can be decreased; he is supposed to take 650 mg (2 tabs tid) and is not happy with the pill burden.\par Also supposed to take Sevelamer 2 tabs tid with meals but he is taking 1 pill. \par -----------------------------------------------------------------------------------------------------\par \par 01/28/2020\par Today,\par Pt presents for a follow up\par No interval illness/ hospitalization \par States he feels 'great'. States he has been eating and sleeping well.\par Denies issues with breathing, leg edema\par he is on Torsemide 20 mg daily and Metolazone 2.5 mg once a week\par Weights are stable.\par Saw Dr. Worthington recently, Hb was 11.4. Didnt get Aranesp as Hb was at goal.\par \par

## 2020-01-29 ENCOUNTER — RX RENEWAL (OUTPATIENT)
Age: 85
End: 2020-01-29

## 2020-02-06 ENCOUNTER — RESULT REVIEW (OUTPATIENT)
Age: 85
End: 2020-02-06

## 2020-02-06 ENCOUNTER — APPOINTMENT (OUTPATIENT)
Dept: HEMATOLOGY ONCOLOGY | Facility: CLINIC | Age: 85
End: 2020-02-06

## 2020-02-06 ENCOUNTER — APPOINTMENT (OUTPATIENT)
Age: 85
End: 2020-02-06

## 2020-02-06 LAB
BASOPHILS # BLD AUTO: 0.1 K/UL — SIGNIFICANT CHANGE UP (ref 0–0.2)
BASOPHILS NFR BLD AUTO: 2.5 % — HIGH (ref 0–2)
EOSINOPHIL # BLD AUTO: 0.1 K/UL — SIGNIFICANT CHANGE UP (ref 0–0.5)
EOSINOPHIL NFR BLD AUTO: 2.6 % — SIGNIFICANT CHANGE UP (ref 0–6)
HCT VFR BLD CALC: 35 % — LOW (ref 39–50)
HGB BLD-MCNC: 11 G/DL — LOW (ref 13–17)
LYMPHOCYTES # BLD AUTO: 0.8 K/UL — LOW (ref 1–3.3)
LYMPHOCYTES # BLD AUTO: 16.3 % — SIGNIFICANT CHANGE UP (ref 13–44)
MCHC RBC-ENTMCNC: 29.3 PG — SIGNIFICANT CHANGE UP (ref 27–34)
MCHC RBC-ENTMCNC: 31.5 G/DL — LOW (ref 32–36)
MCV RBC AUTO: 93 FL — SIGNIFICANT CHANGE UP (ref 80–100)
MONOCYTES # BLD AUTO: 0.4 K/UL — SIGNIFICANT CHANGE UP (ref 0–0.9)
MONOCYTES NFR BLD AUTO: 8 % — SIGNIFICANT CHANGE UP (ref 2–14)
NEUTROPHILS # BLD AUTO: 3.5 K/UL — SIGNIFICANT CHANGE UP (ref 1.8–7.4)
NEUTROPHILS NFR BLD AUTO: 70.6 % — SIGNIFICANT CHANGE UP (ref 43–77)
PLATELET # BLD AUTO: 116 K/UL — LOW (ref 150–400)
RBC # BLD: 3.77 M/UL — LOW (ref 4.2–5.8)
RBC # FLD: 14.6 % — HIGH (ref 10.3–14.5)
WBC # BLD: 4.9 K/UL — SIGNIFICANT CHANGE UP (ref 3.8–10.5)
WBC # FLD AUTO: 4.9 K/UL — SIGNIFICANT CHANGE UP (ref 3.8–10.5)

## 2020-02-20 ENCOUNTER — RESULT REVIEW (OUTPATIENT)
Age: 85
End: 2020-02-20

## 2020-02-20 ENCOUNTER — APPOINTMENT (OUTPATIENT)
Age: 85
End: 2020-02-20

## 2020-02-20 ENCOUNTER — APPOINTMENT (OUTPATIENT)
Dept: HEMATOLOGY ONCOLOGY | Facility: CLINIC | Age: 85
End: 2020-02-20

## 2020-02-20 ENCOUNTER — INPATIENT (INPATIENT)
Facility: HOSPITAL | Age: 85
LOS: 0 days | Discharge: HOME | DRG: 291 | End: 2020-02-21
Attending: INTERNAL MEDICINE | Admitting: INTERNAL MEDICINE
Payer: MEDICARE

## 2020-02-20 VITALS — WEIGHT: 149.91 LBS | HEIGHT: 65 IN

## 2020-02-20 DIAGNOSIS — I77.0 ARTERIOVENOUS FISTULA, ACQUIRED: Chronic | ICD-10-CM

## 2020-02-20 DIAGNOSIS — Z98.62 PERIPHERAL VASCULAR ANGIOPLASTY STATUS: Chronic | ICD-10-CM

## 2020-02-20 DIAGNOSIS — D64.9 ANEMIA, UNSPECIFIED: ICD-10-CM

## 2020-02-20 DIAGNOSIS — Z98.890 OTHER SPECIFIED POSTPROCEDURAL STATES: Chronic | ICD-10-CM

## 2020-02-20 LAB
ALBUMIN SERPL ELPH-MCNC: 3.8 G/DL — SIGNIFICANT CHANGE UP (ref 3.3–5.2)
ALP SERPL-CCNC: 87 U/L — SIGNIFICANT CHANGE UP (ref 40–120)
ALT FLD-CCNC: 34 U/L — SIGNIFICANT CHANGE UP
ANION GAP SERPL CALC-SCNC: 18 MMOL/L — HIGH (ref 5–17)
ANISOCYTOSIS BLD QL: SLIGHT — SIGNIFICANT CHANGE UP
APTT BLD: 36.4 SEC — HIGH (ref 27.5–36.3)
AST SERPL-CCNC: 20 U/L — SIGNIFICANT CHANGE UP
BASOPHILS # BLD AUTO: 0.07 K/UL — SIGNIFICANT CHANGE UP (ref 0–0.2)
BASOPHILS # BLD AUTO: 0.1 K/UL — SIGNIFICANT CHANGE UP (ref 0–0.2)
BASOPHILS NFR BLD AUTO: 0.7 % — SIGNIFICANT CHANGE UP (ref 0–2)
BASOPHILS NFR BLD AUTO: 1.8 % — SIGNIFICANT CHANGE UP (ref 0–2)
BILIRUB SERPL-MCNC: 0.2 MG/DL — LOW (ref 0.4–2)
BLD GP AB SCN SERPL QL: SIGNIFICANT CHANGE UP
BUN SERPL-MCNC: 95 MG/DL — HIGH (ref 8–20)
CALCIUM SERPL-MCNC: 9.6 MG/DL — SIGNIFICANT CHANGE UP (ref 8.6–10.2)
CHLORIDE SERPL-SCNC: 101 MMOL/L — SIGNIFICANT CHANGE UP (ref 98–107)
CO2 SERPL-SCNC: 21 MMOL/L — LOW (ref 22–29)
CREAT SERPL-MCNC: 4.83 MG/DL — HIGH (ref 0.5–1.3)
ELLIPTOCYTES BLD QL SMEAR: SLIGHT — SIGNIFICANT CHANGE UP
EOSINOPHIL # BLD AUTO: 0.24 K/UL — SIGNIFICANT CHANGE UP (ref 0–0.5)
EOSINOPHIL # BLD AUTO: 0.3 K/UL — SIGNIFICANT CHANGE UP (ref 0–0.5)
EOSINOPHIL NFR BLD AUTO: 2.6 % — SIGNIFICANT CHANGE UP (ref 0–6)
EOSINOPHIL NFR BLD AUTO: 3.4 % — SIGNIFICANT CHANGE UP (ref 0–6)
FERRITIN SERPL-MCNC: 248 NG/ML — SIGNIFICANT CHANGE UP (ref 30–400)
GLUCOSE SERPL-MCNC: 178 MG/DL — HIGH (ref 70–99)
HCT VFR BLD CALC: 19.8 % — CRITICAL LOW (ref 39–50)
HCT VFR BLD CALC: 20.5 % — CRITICAL LOW (ref 39–50)
HGB BLD-MCNC: 6.4 G/DL — CRITICAL LOW (ref 13–17)
HGB BLD-MCNC: 6.6 G/DL — CRITICAL LOW (ref 13–17)
HYPOCHROMIA BLD QL: SLIGHT — SIGNIFICANT CHANGE UP
IMM GRANULOCYTES NFR BLD AUTO: 0.4 % — SIGNIFICANT CHANGE UP (ref 0–1.5)
INR BLD: 1.06 RATIO — SIGNIFICANT CHANGE UP (ref 0.88–1.16)
IRON SATN MFR SERPL: 27 % — SIGNIFICANT CHANGE UP (ref 16–55)
IRON SATN MFR SERPL: 79 UG/DL — SIGNIFICANT CHANGE UP (ref 59–158)
LYMPHOCYTES # BLD AUTO: 0.9 K/UL — LOW (ref 1–3.3)
LYMPHOCYTES # BLD AUTO: 1.3 K/UL — SIGNIFICANT CHANGE UP (ref 1–3.3)
LYMPHOCYTES # BLD AUTO: 16.1 % — SIGNIFICANT CHANGE UP (ref 13–44)
LYMPHOCYTES # BLD AUTO: 9.6 % — LOW (ref 13–44)
MACROCYTES BLD QL: SLIGHT — SIGNIFICANT CHANGE UP
MAGNESIUM SERPL-MCNC: 2.2 MG/DL — SIGNIFICANT CHANGE UP (ref 1.6–2.6)
MANUAL SMEAR VERIFICATION: SIGNIFICANT CHANGE UP
MCHC RBC-ENTMCNC: 30 PG — SIGNIFICANT CHANGE UP (ref 27–34)
MCHC RBC-ENTMCNC: 30.2 PG — SIGNIFICANT CHANGE UP (ref 27–34)
MCHC RBC-ENTMCNC: 31.2 GM/DL — LOW (ref 32–36)
MCHC RBC-ENTMCNC: 33.5 G/DL — SIGNIFICANT CHANGE UP (ref 32–36)
MCV RBC AUTO: 90.2 FL — SIGNIFICANT CHANGE UP (ref 80–100)
MCV RBC AUTO: 96.2 FL — SIGNIFICANT CHANGE UP (ref 80–100)
MICROCYTES BLD QL: SLIGHT — SIGNIFICANT CHANGE UP
MONOCYTES # BLD AUTO: 0.6 K/UL — SIGNIFICANT CHANGE UP (ref 0–0.9)
MONOCYTES # BLD AUTO: 0.75 K/UL — SIGNIFICANT CHANGE UP (ref 0–0.9)
MONOCYTES NFR BLD AUTO: 7.3 % — SIGNIFICANT CHANGE UP (ref 2–14)
MONOCYTES NFR BLD AUTO: 8 % — SIGNIFICANT CHANGE UP (ref 2–14)
NEUTROPHILS # BLD AUTO: 5.8 K/UL — SIGNIFICANT CHANGE UP (ref 1.8–7.4)
NEUTROPHILS # BLD AUTO: 7.36 K/UL — SIGNIFICANT CHANGE UP (ref 1.8–7.4)
NEUTROPHILS NFR BLD AUTO: 71.4 % — SIGNIFICANT CHANGE UP (ref 43–77)
NEUTROPHILS NFR BLD AUTO: 78.7 % — HIGH (ref 43–77)
NT-PROBNP SERPL-SCNC: 7724 PG/ML — HIGH (ref 0–300)
OB PNL STL: NEGATIVE — SIGNIFICANT CHANGE UP
OVALOCYTES BLD QL SMEAR: SLIGHT — SIGNIFICANT CHANGE UP
PLAT MORPH BLD: NORMAL — SIGNIFICANT CHANGE UP
PLATELET # BLD AUTO: 192 K/UL — SIGNIFICANT CHANGE UP (ref 150–400)
PLATELET # BLD AUTO: 197 K/UL — SIGNIFICANT CHANGE UP (ref 150–400)
PLATELET COUNT - ESTIMATE: NORMAL — SIGNIFICANT CHANGE UP
POIKILOCYTOSIS BLD QL AUTO: SLIGHT — SIGNIFICANT CHANGE UP
POTASSIUM SERPL-MCNC: 4.3 MMOL/L — SIGNIFICANT CHANGE UP (ref 3.5–5.3)
POTASSIUM SERPL-SCNC: 4.3 MMOL/L — SIGNIFICANT CHANGE UP (ref 3.5–5.3)
PROT SERPL-MCNC: 6.1 G/DL — LOW (ref 6.6–8.7)
PROTHROM AB SERPL-ACNC: 12 SEC — SIGNIFICANT CHANGE UP (ref 10–12.9)
RBC # BLD: 2.13 M/UL — LOW (ref 4.2–5.8)
RBC # BLD: 2.19 M/UL — LOW (ref 4.2–5.8)
RBC # FLD: 14.2 % — SIGNIFICANT CHANGE UP (ref 10.3–14.5)
RBC # FLD: 14.6 % — HIGH (ref 10.3–14.5)
RBC BLD AUTO: NORMAL — SIGNIFICANT CHANGE UP
SODIUM SERPL-SCNC: 140 MMOL/L — SIGNIFICANT CHANGE UP (ref 135–145)
TIBC SERPL-MCNC: 289 UG/DL — SIGNIFICANT CHANGE UP (ref 220–430)
TRANSFERRIN SERPL-MCNC: 202 MG/DL — SIGNIFICANT CHANGE UP (ref 180–329)
TROPONIN T SERPL-MCNC: 0.07 NG/ML — HIGH (ref 0–0.06)
WBC # BLD: 8.1 K/UL — SIGNIFICANT CHANGE UP (ref 3.8–10.5)
WBC # BLD: 9.36 K/UL — SIGNIFICANT CHANGE UP (ref 3.8–10.5)
WBC # FLD AUTO: 8.1 K/UL — SIGNIFICANT CHANGE UP (ref 3.8–10.5)
WBC # FLD AUTO: 9.36 K/UL — SIGNIFICANT CHANGE UP (ref 3.8–10.5)

## 2020-02-20 PROCEDURE — 99285 EMERGENCY DEPT VISIT HI MDM: CPT

## 2020-02-20 PROCEDURE — 99223 1ST HOSP IP/OBS HIGH 75: CPT

## 2020-02-20 PROCEDURE — 93010 ELECTROCARDIOGRAM REPORT: CPT

## 2020-02-20 RX ORDER — PANTOPRAZOLE SODIUM 20 MG/1
40 TABLET, DELAYED RELEASE ORAL EVERY 12 HOURS
Refills: 0 | Status: DISCONTINUED | OUTPATIENT
Start: 2020-02-20 | End: 2020-02-21

## 2020-02-20 RX ORDER — HYDRALAZINE HCL 50 MG
50 TABLET ORAL THREE TIMES A DAY
Refills: 0 | Status: DISCONTINUED | OUTPATIENT
Start: 2020-02-20 | End: 2020-02-21

## 2020-02-20 RX ORDER — CALCITRIOL 0.5 UG/1
0.25 CAPSULE ORAL DAILY
Refills: 0 | Status: DISCONTINUED | OUTPATIENT
Start: 2020-02-20 | End: 2020-02-21

## 2020-02-20 RX ORDER — NIFEDIPINE 30 MG
60 TABLET, EXTENDED RELEASE 24 HR ORAL AT BEDTIME
Refills: 0 | Status: DISCONTINUED | OUTPATIENT
Start: 2020-02-20 | End: 2020-02-21

## 2020-02-20 RX ORDER — NIFEDIPINE 30 MG
90 TABLET, EXTENDED RELEASE 24 HR ORAL
Refills: 0 | Status: DISCONTINUED | OUTPATIENT
Start: 2020-02-20 | End: 2020-02-21

## 2020-02-20 RX ORDER — MAGNESIUM SULFATE 500 MG/ML
1 VIAL (ML) INJECTION ONCE
Refills: 0 | Status: DISCONTINUED | OUTPATIENT
Start: 2020-02-20 | End: 2020-02-21

## 2020-02-20 RX ORDER — ASCORBIC ACID 60 MG
500 TABLET,CHEWABLE ORAL DAILY
Refills: 0 | Status: DISCONTINUED | OUTPATIENT
Start: 2020-02-20 | End: 2020-02-21

## 2020-02-20 RX ORDER — FUROSEMIDE 40 MG
40 TABLET ORAL ONCE
Refills: 0 | Status: COMPLETED | OUTPATIENT
Start: 2020-02-20 | End: 2020-02-20

## 2020-02-20 RX ORDER — ATORVASTATIN CALCIUM 80 MG/1
40 TABLET, FILM COATED ORAL AT BEDTIME
Refills: 0 | Status: DISCONTINUED | OUTPATIENT
Start: 2020-02-20 | End: 2020-02-21

## 2020-02-20 RX ADMIN — Medication 60 MILLIGRAM(S): at 21:32

## 2020-02-20 RX ADMIN — ATORVASTATIN CALCIUM 40 MILLIGRAM(S): 80 TABLET, FILM COATED ORAL at 21:32

## 2020-02-20 RX ADMIN — PANTOPRAZOLE SODIUM 40 MILLIGRAM(S): 20 TABLET, DELAYED RELEASE ORAL at 17:57

## 2020-02-20 RX ADMIN — Medication 40 MILLIGRAM(S): at 17:54

## 2020-02-20 RX ADMIN — Medication 50 MILLIGRAM(S): at 21:32

## 2020-02-20 NOTE — PROGRESS NOTE ADULT - SUBJECTIVE AND OBJECTIVE BOX
Called by RN for 5beats of VT. As per Dr. Palma EP note today:    "frequent idiopathic outflow tract PVCs and short runs of NSVT of same morphology. On ECG has a LB morphology with QS in V1 and V3, inferior axis, and negative in lead one- likely origin in high RVOT. PVCs may be exacerbated at this time by acute medical illness and severe anemia, in addition to having recently stopped flecainide."     Will supplement Mg IV and continue on CCB Procardia. Labs in AM.

## 2020-02-20 NOTE — CONSULT NOTE ADULT - SUBJECTIVE AND OBJECTIVE BOX
HISTORY OF PRESENT ILLNESS:  This is a 85y old Male with a past medical history significant for CKD not on HD, anemia of chronic disease, arrhythmia on flecainide presented to ER from [facility] for anemia.  Patient has been on diuretics. He has chronic anemia and was recently admitted. He denies melena or hematochezia.  He had a recent EGD with gastritis and colonoscopy in 2019 with polypectomy of several polyps and treatment of AVMs. GI was consulted for anemia. He was noted to have a hemoglobin of 6.6/19.8. On 2/6/20  hb/hct was 11/35. His last dose of Aranesp was 6 weeks prior as his Hb has maintained above 10gm/dl. Patient admits to complaints of fatigue and restlessness over the past 2 days.    REVIEW OF SYSTEMS:  Constitutional:  No unintentional weight loss, fevers, chills or night sweats	  Eyes: No eye pain, redness, discharge, or proptosis  ENMT: No sore throat, ear pain, mouth sores, or swollen glands in the neck  Respiratory: No dyspnea, cough or wheezing  Cardiovascular: No chest pain, dyspnea on exertion, or orthopnea  Gastrointestinal:	Please see HPI  Genitourinary: No dysuria or hematuria  Neurological:	 No changes in sleep/wake cycle, convulsions, confusion, dizziness or lightheadedness  Psychiatric: No changes in personality or emotional problems   Hematology: No easy bruising   Endocrine: No hot or cold flashes or deepening of voice	  All other review of systems were completed and were otherwise negative save what is reported in the HPI.    PAST MEDICAL/SURGICAL HISTORY:  Risk factors for obstructive sleep apnea  Anemia  RICHARDS (dyspnea on exertion)  VT (ventricular tachycardia)  HTN (hypertension)  CAD (coronary artery disease)  CKD (chronic kidney disease): stage IV  Arrhythmia  AV block, 1st degree  DM (diabetes mellitus)  H/O carotid endarterectomy: Right  A-V fistula: left arm 5/2017  H/O angioplasty: 2013,  no  intervention  H/O left knee surgery  H/O circumcision: at  age  65    SOCIAL HISTORY:  - ILLICIT DRUG USE: Denies    FAMILY HISTORY:  No known history of gastrointestinal or liver disease;  Family history of premature CAD  Family history of lung cancer  Family history of cancer      HOME MEDICATIONS:  Aranesp 100 mcg/0.5 mL injectable solution: 1  injectable every 2 weeks (20 Feb 2020 14:37)  aspirin 81 mg oral delayed release tablet: 1 tab(s) orally once a day (20 Feb 2020 14:37)  atorvastatin 40 mg oral tablet: 1 tab(s) orally once a day (at bedtime) (20 Feb 2020 12:30)  calcitriol 0.25 mcg oral capsule: 1 cap(s) orally once a day (20 Feb 2020 12:30)  ferrous sulfate 325 mg (65 mg elemental iron) oral delayed release tablet: 1 tab(s) orally 3 times a day (20 Feb 2020 12:30)  hydrALAZINE 50 mg oral tablet: 1 tab(s) orally 3 times a day (20 Feb 2020 14:37)  metOLazone 2.5 mg oral tablet: 1 tab(s) orally once a week (20 Feb 2020 14:37)  Nephro-Thomas oral tablet: 1 tab(s) orally once a day (20 Feb 2020 12:30)  NIFEdipine 60 mg oral tablet, extended release: 1 tab(s) orally once a day (at bedtime) (20 Feb 2020 12:30)  NIFEdipine 90 mg oral tablet, extended release: 1 tab(s) orally once a day (20 Feb 2020 12:30)  pantoprazole 40 mg oral delayed release tablet: 1 tab(s) orally 3 times a day (before meals) (20 Feb 2020 14:37)  torsemide 20 mg oral tablet: 1 tab(s) orally once a day (20 Feb 2020 14:37)  Vitamin C 250 mg oral tablet: 1 tab(s) orally once a day (20 Feb 2020 12:30)  Vitamin D3 50,000 intl units oral capsule: 1 cap(s) orally once a week (20 Feb 2020 12:30)    INPATIENT MEDICATIONS:  MEDICATIONS  (STANDING):  ascorbic acid 500 milliGRAM(s) Oral daily  atorvastatin 40 milliGRAM(s) Oral at bedtime  calcitriol   Capsule 0.25 MICROGram(s) Oral daily  cloNIDine 0.1 milliGRAM(s) Oral daily  hydrALAZINE 50 milliGRAM(s) Oral three times a day  metolazone 2.5 milliGRAM(s) Oral <User Schedule>  multivitamin 1 Tablet(s) Oral daily  NIFEdipine XL 60 milliGRAM(s) Oral at bedtime  NIFEdipine XL 90 milliGRAM(s) Oral <User Schedule>  pantoprazole    Tablet 40 milliGRAM(s) Oral every 12 hours    MEDICATIONS  (PRN):    ALLERGIES:  Plavix (Hives)  Toprol-XL (Rash)    VITAL SIGNS LAST 24 HOURS:  T(C): 36.8 (20 Feb 2020 17:57), Max: 36.8 (20 Feb 2020 16:20)  T(F): 98.2 (20 Feb 2020 17:57), Max: 98.2 (20 Feb 2020 16:20)  HR: 94 (20 Feb 2020 17:57) (86 - 97)  BP: 156/64 (20 Feb 2020 17:57) (137/65 - 165/61)  BP(mean): --  RR: 16 (20 Feb 2020 17:57) (16 - 18)  SpO2: 91% (20 Feb 2020 17:57) (91% - 98%)      02-20-20 @ 07:01  -  02-20-20 @ 19:09  --------------------------------------------------------  IN: 501 mL / OUT: 150 mL / NET: 351 mL        02-20-20 @ 07:01  -  02-20-20 @ 19:09  --------------------------------------------------------  IN: 501 mL / OUT: 150 mL / NET: 351 mL      PHYSICAL EXAM:  Constitutional: Well-developed, well-nourished, in no apparent distress  Eyes: Sclerae anicteric, conjunctivae normal  ENMT: Mucus membranes moist, no oropharyngeal thrush noted  Neck: No thyroid nodules appreciated, no significant cervical or supraclavicular lymphadenopathy  Respiratory: Breathing nonlabored; clear to auscultation  Cardiovascular: Regular rate and rhythm  Gastrointestinal: Soft, nontender, nondistended, normoactive bowel sounds; no hepatosplenomegaly appreciated  Extremities: No clubbing, cyanosis or edema  Neurological: Alert and oriented to person, place and time; no asterixis  Skin: No jaundice  Lymph Nodes: No significant lymphadenopathy  Musculoskeletal: No significant peripheral atrophy  Psychiatric: Affect and mood appropriate      LABS:                        6.4    9.36  )-----------( 197      ( 20 Feb 2020 12:30 )             20.5     PT/INR - ( 20 Feb 2020 12:30 )   PT: 12.0 sec;   INR: 1.06 ratio         PTT - ( 20 Feb 2020 12:30 )  PTT:36.4 sec  02-20    140  |  101  |  95.0<H>  ----------------------------<  178<H>  4.3   |  21.0<L>  |  4.83<H>    Ca    9.6      20 Feb 2020 12:30    TPro  6.1<L>  /  Alb  3.8  /  TBili  0.2<L>  /  DBili  x   /  AST  20  /  ALT  34  /  AlkPhos  87  02-20    LIVER FUNCTIONS - ( 20 Feb 2020 12:30 )  Alb: 3.8 g/dL / Pro: 6.1 g/dL / ALK PHOS: 87 U/L / ALT: 34 U/L / AST: 20 U/L / GGT: x

## 2020-02-20 NOTE — CONSULT NOTE ADULT - SUBJECTIVE AND OBJECTIVE BOX
85 year old male patient with a known history of anomalous right coronary, non obstructive CAD, HTN, HLD, HFpEF, carotid disease s/p right CEA, ESRD on HD (LUE AV fistula), aortic stenosis, anemia and DM who is admitted with anemia, likely GIB.     PAST MEDICAL & SURGICAL HISTORY:  Risk factors for obstructive sleep apnea  Anemia  RICHARDS (dyspnea on exertion)  VT (ventricular tachycardia)  HTN (hypertension)  CAD (coronary artery disease)  CKD (chronic kidney disease): stage IV  Arrhythmia  AV block, 1st degree  DM (diabetes mellitus)  H/O carotid endarterectomy: Right  A-V fistula: left arm 5/2017  H/O angioplasty: 2013,  no  intervention  H/O left knee surgery  H/O circumcision: at  age  65    REVIEW OF SYSTEMS  General:	  Skin/Breast:	  Ophthalmologic:	  ENMT:	  Respiratory and Thorax:	  Cardiovascular:	  Gastrointestinal:	  Genitourinary:	  Musculoskeletal:	  Neurological:	  Psychiatric:		    MEDICATIONS  (STANDING):  furosemide   Injectable 40 milliGRAM(s) IV Push Once    MEDICATIONS  (PRN):    Allergies  Plavix (Hives)  Toprol-XL (Rash)    SOCIAL HISTORY:    FAMILY HISTORY:  Family history of premature CAD  Family history of lung cancer  Family history of cancer    Vital Signs Last 24 Hrs  T(C): 36.7 (20 Feb 2020 14:45), Max: 36.7 (20 Feb 2020 14:20)  T(F): 98 (20 Feb 2020 14:45), Max: 98.1 (20 Feb 2020 14:20)  HR: 92 (20 Feb 2020 14:45) (88 - 97)  BP: 137/65 (20 Feb 2020 14:45) (137/65 - 162/67)  RR: 18 (20 Feb 2020 14:45) (17 - 18)  SpO2: 98% (20 Feb 2020 14:45) (96% - 98%)    Physical Exam:  Constitutional: AAOx3, NAD  Neck: supple, No JVD  Cardiovascular: +S1S2 RRR, no murmurs, rubs, gallops   Pulmonary: CTA b/l, unlabored, no wheezes, rales. rhonci  Abdomen: +BS, soft NTND  Extremities: no edema b/l, +distal pulses b/l  Neuro: non focal, speech clear, MARQUEZ x 4    LABS:                        6.4    9.36  )-----------( 197      ( 20 Feb 2020 12:30 )             20.5     140  |  101  |  95.0<H>  ----------------------------<  178<H>  4.3   |  21.0<L>  |  4.83<H>    Ca    9.6      20 Feb 2020 12:30  TPro  6.1<L>  /  Alb  3.8  /  TBili  0.2<L>  /  DBili  x   /  AST  20  /  ALT  34  /  AlkPhos  87  02-20  LIVER FUNCTIONS - ( 20 Feb 2020 12:30 )  Alb: 3.8 g/dL / Pro: 6.1 g/dL / ALK PHOS: 87 U/L / ALT: 34 U/L / AST: 20 U/L / GGT: x         PT/INR - ( 20 Feb 2020 12:30 )   PT: 12.0 sec;   INR: 1.06 ratio    PTT - ( 20 Feb 2020 12:30 )  PTT:36.4 secCARDIAC MARKERS ( 20 Feb 2020 12:30 )  x     / 0.07 ng/mL / x     / x     / x        RADIOLOGY & ADDITIONAL STUDIES:  TTE 12/2019  Summary:   1. Left ventricular ejection fraction, by visual estimation, is 65 to 70%.   2. Normal global left ventricular systolic function.   3. LV Ejection Fraction by Reynoso's Method with a biplane EF of 71 %.   4. Mildly increased LV wall thickness.   5. Normal left ventricular internal cavity size.   6. Spectral Doppler shows impaired relaxation pattern of left ventricular myocardial filling (Grade I diastolicdysfunction).   7. There is mild concentric left ventricular hypertrophy.   8. Moderately enlarged left atrium.   9. Degenerative mitral valve.  10. Mild to moderate mitral valve regurgitation.  11. Moderate mitral annular calcification.  12. Moderate thickening and calcification of the anterior and posterior mitral valve leaflets.  13. Mild to moderate aortic valve stenosis.  14. Estimated pulmonary artery systolic pressure is 54.4 mmHg assuming a right atrial pressure of 15 mmHg, which is consistent with moderate pulmonary hypertension.  15. Mitral valve mean gradient is 7.0 mmHg consistent with moderate mitral stenosis.  16. The aortic valve mean gradient is 15.5 mmHg consistent with mild aortic stenosis. 85 year old male patient with a known history of anomalous right coronary, non obstructive CAD, HTN, HLD, HFpEF, carotid disease s/p right CEA, CKD (LUE AV fistula), aortic stenosis, anemia and DM who is admitted with anemia, likely GIB. He presents with telemetry evidence of unifocal PVCs with are likely outflow tract PVCs. Patient mostly feels well and is very active. He even drives his car. He has been compliant and tolerating Flecainide for at least 8 years. Denies any juan syncope, sherrell    PAST MEDICAL & SURGICAL HISTORY:  Risk factors for obstructive sleep apnea  Anemia  RICHARDS (dyspnea on exertion)  VT (ventricular tachycardia)  HTN (hypertension)  CAD (coronary artery disease)  CKD (chronic kidney disease): stage IV  Arrhythmia  AV block, 1st degree  DM (diabetes mellitus)  H/O carotid endarterectomy: Right  A-V fistula: left arm 5/2017  H/O angioplasty: 2013,  no  intervention  H/O left knee surgery  H/O circumcision: at  age  65    REVIEW OF SYSTEMS  General: - fever or chills, +weakness  Skin/Breast: - rashes  Ophthalmologic: - blurred vision	  ENMT: - sore throat  Respiratory and Thorax:	- dyspnea, - cough  Cardiovascular: + palpitations, - chest pain  Gastrointestinal:	 - N/V +melena  Genitourinary: - dysuria  Musculoskeletal:	 + arthritis  Neurological: - weaknesses  Psychiatric: - depression or anxiety	    MEDICATIONS  (STANDING):  furosemide   Injectable 40 milliGRAM(s) IV Push Once    MEDICATIONS  (PRN):    Allergies  Plavix (Hives)  Toprol-XL (Rash)    SOCIAL HISTORY: former smoker, non drinker    FAMILY HISTORY:  Family history of premature CAD  Family history of lung cancer  Family history of cancer    Vital Signs Last 24 Hrs  T(C): 36.7 (20 Feb 2020 14:45), Max: 36.7 (20 Feb 2020 14:20)  T(F): 98 (20 Feb 2020 14:45), Max: 98.1 (20 Feb 2020 14:20)  HR: 92 (20 Feb 2020 14:45) (88 - 97)  BP: 137/65 (20 Feb 2020 14:45) (137/65 - 162/67)  RR: 18 (20 Feb 2020 14:45) (17 - 18)  SpO2: 98% (20 Feb 2020 14:45) (96% - 98%)    Physical Exam:  Constitutional: AAOx3, NAD  Neck: supple, No JVD  Cardiovascular: +S1S2 RRR, 2/6 DEANA at apex  Pulmonary: CTA b/l, unlabored, no wheezes, rales. No rhonchi  Abdomen: +BS, soft NTND  Extremities: no edema b/l,   Neuro: non focal, speech clear, MARQUEZ x 4    LABS:                        6.4    9.36  )-----------( 197      ( 20 Feb 2020 12:30 )             20.5     140  |  101  |  95.0<H>  ----------------------------<  178<H>  4.3   |  21.0<L>  |  4.83<H>    Ca    9.6      20 Feb 2020 12:30  TPro  6.1<L>  /  Alb  3.8  /  TBili  0.2<L>  /  DBili  x   /  AST  20  /  ALT  34  /  AlkPhos  87  02-20  LIVER FUNCTIONS - ( 20 Feb 2020 12:30 )  Alb: 3.8 g/dL / Pro: 6.1 g/dL / ALK PHOS: 87 U/L / ALT: 34 U/L / AST: 20 U/L / GGT: x         PT/INR - ( 20 Feb 2020 12:30 )   PT: 12.0 sec;   INR: 1.06 ratio    PTT - ( 20 Feb 2020 12:30 )  PTT:36.4 secCARDIAC MARKERS ( 20 Feb 2020 12:30 )  x     / 0.07 ng/mL / x     / x     / x        RADIOLOGY & ADDITIONAL STUDIES:  TTE 12/2019  Summary:   1. Left ventricular ejection fraction, by visual estimation, is 65 to 70%.   2. Normal global left ventricular systolic function.   3. LV Ejection Fraction by Reynoso's Method with a biplane EF of 71 %.   4. Mildly increased LV wall thickness.   5. Normal left ventricular internal cavity size.   6. Spectral Doppler shows impaired relaxation pattern of left ventricular myocardial filling (Grade I diastolicdysfunction).   7. There is mild concentric left ventricular hypertrophy.   8. Moderately enlarged left atrium.   9. Degenerative mitral valve.  10. Mild to moderate mitral valve regurgitation.  11. Moderate mitral annular calcification.  12. Moderate thickening and calcification of the anterior and posterior mitral valve leaflets.  13. Mild to moderate aortic valve stenosis.  14. Estimated pulmonary artery systolic pressure is 54.4 mmHg assuming a right atrial pressure of 15 mmHg, which is consistent with moderate pulmonary hypertension.  15. Mitral valve mean gradient is 7.0 mmHg consistent with moderate mitral stenosis.  16. The aortic valve mean gradient is 15.5 mmHg consistent with mild aortic stenosis.    Cardiac cath 2/3/17  DIAGNOSTIC IMPRESSIONS: Probably significant RCA disease with unusual take  off with non obstructive CAD of LCX and LAD  DIAGNOSTIC RECOMMENDATIONS: Nuclear PET SCAN. Possible future PCI The  patient should continue with the present medications.  INTERVENTIONAL IMPRESSIONS: Probably significant RCA disease with unusual  take off with non obstructive CAD of LCX and LAD  INTERVENTIONAL RECOMMENDATIONS: Nuclear PET SCAN. Possible future PCI    EKG: NSR    A/P  85 year old male patient with a known history of anomalous right coronary, non obstructive CAD, HTN, HLD, HFpEF, carotid disease s/p right CEA, CKD (LUE AV fistula), aortic stenosis, anemia and DM admitted with GIB and PVCs likley outflow tract in nature.

## 2020-02-20 NOTE — CONSULT NOTE ADULT - SUBJECTIVE AND OBJECTIVE BOX
[facility] DIVISION OF KIDNEY DISEASES AND HYPERTENSION -- INITIAL CONSULT NOTE  --------------------------------------------------------------------------------  HPI:    85 year old male pt with  hx of  CKD 5 not on HD, s/p LUE AVF, anemia of chronic disease on Aranesp, recent GI bleed s/p colonoscopy and polyp resection, EGD with gastritis sent to ED from hematologist office for acute worsening of anemia. Pt well known to our service; he follows with Dr. Avendano and myself in nephrology office. Pt seen and examined; feels well. Reports episode of nose bleed yesterday. Denies hematochezia or melena.      PAST HISTORY  --------------------------------------------------------------------------------  PAST MEDICAL & SURGICAL HISTORY:  Risk factors for obstructive sleep apnea  Anemia  RICHARDS (dyspnea on exertion)  VT (ventricular tachycardia)  HTN (hypertension)  CAD (coronary artery disease)  CKD (chronic kidney disease): stage IV  Arrhythmia  AV block, 1st degree  DM (diabetes mellitus)  H/O carotid endarterectomy: Right  A-V fistula: left arm 5/2017  H/O angioplasty: 2013,  no  intervention  H/O left knee surgery  H/O circumcision: at  age  65    FAMILY HISTORY:  Family history of premature CAD  Family history of lung cancer  Family history of cancer    PAST SOCIAL HISTORY:    ALLERGIES & MEDICATIONS  --------------------------------------------------------------------------------  Allergies    Plavix (Hives)  Toprol-XL (Rash)    Intolerances      Standing Inpatient Medications  ascorbic acid 500 milliGRAM(s) Oral daily  atorvastatin 40 milliGRAM(s) Oral at bedtime  calcitriol   Capsule 0.25 MICROGram(s) Oral daily  cloNIDine 0.1 milliGRAM(s) Oral daily  hydrALAZINE 50 milliGRAM(s) Oral three times a day  metolazone 2.5 milliGRAM(s) Oral <User Schedule>  multivitamin 1 Tablet(s) Oral daily  NIFEdipine XL 60 milliGRAM(s) Oral at bedtime  NIFEdipine XL 90 milliGRAM(s) Oral <User Schedule>  pantoprazole    Tablet 40 milliGRAM(s) Oral every 12 hours    PRN Inpatient Medications      REVIEW OF SYSTEMS  --------------------------------------------------------------------------------  Gen: No weight changes, fatigue, fevers/chills, weakness  Skin: No rashes  Head/Eyes/Ears/Mouth: No headache; Normal hearing; Normal vision w/o blurriness  Respiratory: No dyspnea, cough, wheezing, hemoptysis  CV: No chest pain, PND, orthopnea  GI: No abdominal pain, diarrhea, constipation, nausea, vomiting, melena, hematochezia  : No increased frequency, dysuria, hematuria, nocturia  MSK: No joint pain/swelling; no back pain; no edema  Neuro: No dizziness/lightheadedness, weakness, seizures, numbness, tingling  Heme: No easy bruising or bleeding  Endo: No heat/cold intolerance  Psych: No significant nervousness, anxiety, stress, depression    All other systems were reviewed and are negative, except as noted.    VITALS/PHYSICAL EXAM  --------------------------------------------------------------------------------  T(C): 36.8 (02-20-20 @ 16:20), Max: 36.8 (02-20-20 @ 16:20)  HR: 86 (02-20-20 @ 16:20) (86 - 97)  BP: 165/61 (02-20-20 @ 16:20) (137/65 - 165/61)  RR: 16 (02-20-20 @ 16:20) (16 - 18)  SpO2: 94% (02-20-20 @ 16:20) (94% - 98%)  Wt(kg): --  Height (cm): 165.1 (02-20-20 @ 11:22)  Weight (kg): 68 (02-20-20 @ 11:22)  BMI (kg/m2): 24.9 (02-20-20 @ 11:22)  BSA (m2): 1.75 (02-20-20 @ 11:22)      Physical Exam:  	Gen: NAD,pale  	HEENT: supple neck  	Pulm: CTA B/L  	CV: RRR, S1S2; no rub  	Back: No spinal or CVA tenderness  	Abd: +BS, soft, nontender/nondistended  	: No suprapubic tenderness  	UE: Warm,  no edema; no asterixis  	LE: Warm,  no edema  	Neuro: No focal deficits  	Psych: Normal affect and mood  	Skin: Warm, without rashes  	Vascular access: RADE ROBERTF+    LABS/STUDIES  --------------------------------------------------------------------------------              6.4    9.36  >-----------<  197      [02-20-20 @ 12:30]              20.5     140  |  101  |  95.0  ----------------------------<  178      [02-20-20 @ 12:30]  4.3   |  21.0  |  4.83        Ca     9.6     [02-20-20 @ 12:30]    TPro  6.1  /  Alb  3.8  /  TBili  0.2  /  DBili  x   /  AST  20  /  ALT  34  /  AlkPhos  87  [02-20-20 @ 12:30]    PT/INR: PT 12.0 , INR 1.06       [02-20-20 @ 12:30]  PTT: 36.4       [02-20-20 @ 12:30]    Troponin 0.07      [02-20-20 @ 12:30]    Creatinine Trend:  SCr 4.83 [02-20 @ 12:30]    Urinalysis - [02-01-18 @ 20:53]      Color Yellow / Appearance Clear / SG 1.015 / pH 6.0      Gluc Negative / Ketone Negative  / Bili Negative / Urobili Negative       Blood Trace / Protein 100 / Leuk Est Negative / Nitrite Negative      RBC 0-2 / WBC 0-2 / Hyaline  / Gran  / Sq Epi  / Non Sq Epi Rare / Bacteria       Iron 67, TIBC 240, %sat 28      [12-20-19 @ 06:35]  Ferritin 247      [12-20-19 @ 06:35]  HbA1c 4.9      [08-09-18 @ 05:54]    HBsAb <3.0      [08-09-18 @ 18:29]  HBsAg Nonreact      [08-09-18 @ 18:29]  HBcAb Nonreact      [08-09-18 @ 18:29]  HCV 0.09, Nonreact      [08-09-18 @ 18:29]

## 2020-02-20 NOTE — ED PROVIDER NOTE - PHYSICAL EXAMINATION
Gen: NAD, AOx3  Head: NCAT  Lung: CTAB, no respiratory distress, no wheezing, rales, rhonchi  CV: normal s1/s2, rrr, no murmurs, Normal perfusion  Abd: soft, NTND  MSK: No edema, no visible deformities, full range of motion in all 4 extremities  Neuro: No focal neurologic deficits  Skin: No rash   Psych: normal affect   Rectal: Black stool, normal rectal tone

## 2020-02-20 NOTE — ED ADULT NURSE NOTE - OBJECTIVE STATEMENT
assumed pt care at 1200. Pt A&Ox 4. Pt is ESRD patient who gets blood drawn by Dr Baker every few weeks, pt states he went for routine bloodwork this AM and Hgb was found to be 6.6. Pt states he has been feeling tired lately. Denies any known bleeding, chest pain, SOB, N/V/D, headaches, blurred vision, dizziness,  symptoms. No s/s of respiratory distress noted. Pt in NSR with PVCs on monitor. L AV Fistula in place- never been used for HD. Safety maintained. will continue to monitor.

## 2020-02-20 NOTE — H&P ADULT - HISTORY OF PRESENT ILLNESS
The patient is an 85 year old male with a history of CKD stage 5, anemia on chronic disease and previous GI bleed status post colonoscopy with two ulcerated polyps s/p resections and colonic AVMs, EGD with gastritis, VPCs   (11/2019) who was referred from [facility] for anemia. He was noted to have a hemoglobin of 6.6/19.8. On 2/6/20  hb/hct was 11/35. His last dose of Aranesp was 6 weeks prior as his Hb has maintained above 10gm/dl. Patient admits to complaints of fatigue and restlessness over the past 2 days. FOBT in the ER negative.  Of note, he was admitted to [facility] from 12/18-22 for acute on chronic CHF, ARACELI with CKD and anemia. At the time his FOBT was negative; he was seen by GI with no further work up recommended at the time. He was transfused and discharged home to continue Aranesp as an outpatient.

## 2020-02-20 NOTE — ED PROVIDER NOTE - CLINICAL SUMMARY MEDICAL DECISION MAKING FREE TEXT BOX
84yo male with anemia, unclear if 2/2 GIB vs anemia of chronic disease/CKD- will check labs, ekg, cxr, stool occult, reassess. Denisse Soliz DO

## 2020-02-20 NOTE — CONSULT NOTE ADULT - ASSESSMENT
85y old Male with a past medical history significant for CKD not on HD, anemia of chronic disease, arrhythmia on flecainide, colonic avms and polyps on a recent colonoscopy who presented to ER for anemia. He likely has anemia of chronic disease and may have occult bleeding from small bowel Avms. He seemed to do well on Aranesp.    - can consider outpatient capsule endoscopy versus a CT enterography (this is a contrast study) however   - monitor cbc  - agree with transfusion  - Aranesp dosing as per hematology and renal    Thanks

## 2020-02-20 NOTE — CONSULT NOTE ADULT - ATTENDING COMMENTS
Pt with frequent idiopathic outflow tract PVCs and short runs of NSVT of same morphology. On ECG has a LB morphology with QS in V1 and V3, inferior axis, and negative in lead one- likely origin in high RVOT.   He denies any associated symptoms, and has no history of syncope. LAst TTE with normal LV function. No indication for treatment of PVCs at this time. We discussed indications for treatment mostly for symptomatic benefit or if very high frequency associated with cardiomyopathy.   -Will plan outpatient Holter monitor for PVC burden, and outpt followup.   -pt reports hx of allergy (Hives) to beta blockers- can avoid this   -cont to hold flecainide for now. can reconsider antiarrhythmic medications if treatment indicated. Pt with frequent idiopathic outflow tract PVCs and short runs of NSVT of same morphology. On ECG has a LB morphology with QS in V1 and V3, inferior axis, and negative in lead one- likely origin in high RVOT. PVCs may be exacerbated at this time by acute medical illness and severe anemia, in addition to having recently stopped flecainide.   He denies any associated symptoms, and has no history of syncope. LAst TTE with normal LV function. No indication for treatment of PVCs at this time. We discussed indications for treatment mostly for symptomatic benefit or if very high frequency associated with cardiomyopathy.   -transfusions as planned for treatment of anemia  -check Mg level- replete as tolerated. consider discharging with Mg supplementation which can be helpful for PVCs  -Will plan outpatient Holter monitor for PVC burden, and outpt followup.   -pt reports hx of allergy (Hives) to beta blockers- can avoid this   -cont to hold flecainide for now. can reconsider antiarrhythmic medications if treatment indicated.

## 2020-02-20 NOTE — CONSULT NOTE ADULT - SUBJECTIVE AND OBJECTIVE BOX
[facility], [facility]                                   [address] [place] [address]                                                      PHONE: [phone]                                                         FAX: [phone]  [email or web address]  ---------------------------------------------------------------------------------------------------------------------------------    HPI:  CHRISTIANO ELIZABETH is an 85y Male iwht mild AS, cad, diet controlled DM, ESRD, HTN was admitted 6 weeks ago with GI bleed and found to have bleeding polyps.  He returned home and was following with a hematologist.  Today, the hematologist noted that his Hg went from 11 to 6.6.  He was sent to the ER.  He denies recent fevers, chills, nasuea, vomiting diarrhea.  He is not sure if he has had blood in his stool.  He has not had chest pain, dyspnea, orthopnea.     PAST MEDICAL & SURGICAL HISTORY:  Risk factors for obstructive sleep apnea  Anemia  RICHARDS (dyspnea on exertion)  VT (ventricular tachycardia)  HTN (hypertension)  CAD (coronary artery disease)  CKD (chronic kidney disease): stage IV  Arrhythmia  AV block, 1st degree  DM (diabetes mellitus)  H/O carotid endarterectomy: Right  A-V fistula: left arm 5/2017  H/O angioplasty: 2013,  no  intervention  H/O left knee surgery  H/O circumcision: at  age  65      Plavix (Hives)  Toprol-XL (Rash)    Meds  · 	cloNIDine 0.1 mg oral tablet: 1 tab(s) orally once a day   · 	pantoprazole 40 mg oral delayed release tablet: 1 tab(s) orally once a day (before a meal)  · 	torsemide 20 mg oral tablet: 1 tab(s) orally 2 times a day  · 	NIFEdipine 90 mg oral tablet, extended release: 1 tab(s) orally once a day  · 	atorvastatin 40 mg oral tablet: 1 tab(s) orally once a day (at bedtime)  · 	calcitriol 0.25 mcg oral capsule: 1 cap(s) orally once a day  · 	Vitamin C 250 mg oral tablet: 1 tab(s) orally once a day  · 	Nephro-Thomas oral tablet: 1 tab(s) orally once a day  · 	ferrous sulfate 325 mg (65 mg elemental iron) oral delayed release tablet: 1 tab(s) orally 3 times a day  · 	hydrALAZINE 100 mg oral tablet: 1 tab(s) orally 3 times a day  · 	Vitamin D3 50,000 intl units oral capsule: 1 cap(s) orally once a week  · 	metOLazone 5 mg oral tablet: 0.5 tab(s) orally every other day (at bedtime), As Needed      Family History: Pt denies hx of CAD    Social History:  Cigarettes:   denies                 Alchohol:   no              Illicit Drug Abuse:  no    ROS:    Extensively Reviewed with pertinents as per HPI the remainder were negative.      Vital Signs Last 24 Hrs  T(C): 36.6 (20 Feb 2020 11:23), Max: 36.6 (20 Feb 2020 11:23)  T(F): 97.8 (20 Feb 2020 11:23), Max: 97.8 (20 Feb 2020 11:23)  HR: 97 (20 Feb 2020 11:23) (97 - 97)  BP: 154/64 (20 Feb 2020 11:23) (154/64 - 154/64)  BP(mean): --  RR: 18 (20 Feb 2020 11:23) (18 - 18)  SpO2: 98% (20 Feb 2020 11:23) (98% - 98%)  ICU Vital Signs Last 24 Hrs  CHRISTIANO ELIZABETH  I&O's Detail    I&O's Summary    Drug Dosing Weight  CHRISTIANO ELIZABETH      PHYSICAL EXAM:  General: Appears well developed, well nourished alert and cooperative.  HEENT: Head; normocephalic, atraumatic.  Eyes: Pupils reactive, cornea wnl.  Neck: Supple, no nodes adenopathy, no NVD or carotid bruit or thyromegaly.  CARDIOVASCULAR: Normal S1 and S2, No murmur, rub, gallop or lift.   LUNGS: No rales, rhonchi or wheeze. Normal breath sounds bilaterally.  ABDOMEN: Soft, nontender without mass or organomegaly. bowel sounds normoactive.  EXTREMITIES: No clubbing, cyanosis or edema. Distal pulses wnl.   SKIN: warm and dry with normal turgor.  NEURO: Alert/oriented x 3/normal motor exam. No pathologic reflexes.    PSYCH: normal affect.        LABS:                        6.6    8.1   )-----------( 192      ( 20 Feb 2020 10:19 )             19.8           CHRISTIANO GLENN            RADIOLOGY & ADDITIONAL STUDIES:    INTERPRETATION OF TELEMETRY (personally reviewed): PVCs    ECG: none in chart    ECHO: 12/18/19    Summary:   1. Left ventricular ejection fraction, by visual estimation, is 65 to 70%.   2. Normal global left ventricular systolic function.   3. LV Ejection Fraction by Reynoso's Method with a biplane EF of 71 %.   4. Mildly increased LV wall thickness.   5. Normal left ventricular internal cavity size.   6. Spectral Doppler shows impaired relaxation pattern of left ventricular myocardial filling (Grade I diastolicdysfunction).   7. There is mild concentric left ventricular hypertrophy.   8. Moderately enlarged left atrium.   9. Degenerative mitral valve.  10. Mild to moderate mitral valve regurgitation.  11. Moderate mitral annular calcification.  12. Moderate thickening and calcification of the anterior and posterior mitral valve leaflets.  13. Mild to moderate aortic valve stenosis.  14. Estimated pulmonary artery systolic pressure is 54.4 mmHg assuming a right atrial pressure of 15 mmHg, which is consistent with moderate pulmonary hypertension.  15. Mitral valve mean gradient is 7.0 mmHg consistent with moderate mitral stenosis.  16. The aortic valve mean gradient is 15.5 mmHg consistent with mild aortic stenosis.

## 2020-02-20 NOTE — CONSULT NOTE ADULT - ASSESSMENT
Pt with CKD stage V, admitted for anemia     1)CKD stage V- not on dialysis  2) Acute on chronic anemia  3) HTN  4) Metabolic acidosis    s/p Aranesp injection this AM at Hematologist's office  2 U PPRBC transfusion, Lasix 40 mg IV x1  Obtain iron panel  GI eval  Bp stable; continue current antihypertensive regimen  Resume home dose of torsemide (20 mg daily)  Continue sodium bicarb po ; monitor serum Co2 levels  Discussed with Dr. Soliz and pt's daughter Vanessa.

## 2020-02-20 NOTE — CONSULT NOTE ADULT - ASSESSMENT
Assessment and Plan:  In summary, CHRISTIANO ELIZABETH is an 85y Male with past medical history significant for mild AS, cad, diet controlled DM, HTN was admitted 6 weeks ago with GI bleed and found to have bleeding polyps presents with recurrent GI bleed.    PVCs- can add low dose beta blocker (metoprolol 25mg bid or coreg 6.25mg bid) if there is bigeminy or if the PVCs become very frequent.  Would check electrolytes including mag, K and ionized CA    Preoperative assessment- patient will likely need colonoscopy which is a low risk procedure.  Would proceed to colonoscopy without further cardiovascular workup.    HTN- can add beta blocker to regimen if he had bigeminy or very frequent PVCs.    Aortic Stenosis- mild on last echo.  Can repeat echo in the future to continue to track progression of valve disease.  Usually would be at 3-5 year intervals.    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  Unclear if this is now a fixed problem or if it would improve with HD        Thank you for allowing [facility] to participate in the care of this patient.  Please feel free to call with any questions or concerns.

## 2020-02-20 NOTE — H&P ADULT - ASSESSMENT
The patient is an 85 year old male with a history of CKD stage 5, anemia on chronic disease and previous GI bleed status post colonoscopy with two ulcerated polyps s/p resections and colonic AVMs, EGD with gastritis, VPCs   (11/2019) who was referred from [facility] for anemia. He was noted to have a hemoglobin of 6.6/19.8. On 2/6/20  hb/hct was 11/35. His last dose of Aranesp was 6 weeks prior as his Hb has maintained above 10gm/dl. Patient admits to complaints of fatigue and restlessness over the past 2 days. FOBT in the ER negative.  Of note, he was admitted to [facility] from 12/18-22 for acute on chronic CHF, ARACELI with CKD and anemia. At the time his FOBT was negative; he was seen by GI with no further work up recommended at the time. He was transfused and discharged home to continue Aranesp as an outpatient.     Assessment/Plan:    1. Acute on chronic anemia likely secondary to underlying CKD: FOBT negative on admission  Transfuse 2 units PRBC with IV lasix 40mg between transfusions  Monitor Hb/hct    2. PVCs/Bigemeny: Continue cardiac monitoring  Patient is allergic to BB per daughter at bedside, On procardia  Was taken off Flecanide by cardiology in December  Cardiology/EP to follow up  Monitor electrolytes    3. CKD stage 5: Stable   Monitor BMP    4. Chronic diastolic CHF: Hold torsemide  IV lasix 40mg X1 between transfusions today  Metalozone on Wednesdays    5. Hypertension: Continue hydralazine, nifedepine and clonidine with parameters  Monitor BP    VTE_ SCDS bilaterally The patient is an 85 year old male with a history of CKD stage 5, anemia on chronic disease and previous GI bleed status post colonoscopy with two ulcerated polyps s/p resections and colonic AVMs, EGD with gastritis, VPCs   (11/2019) who was referred from [facility] for anemia. He was noted to have a hemoglobin of 6.6/19.8. On 2/6/20  hb/hct was 11/35. His last dose of Aranesp was 6 weeks prior as his Hb has maintained above 10gm/dl. Patient admits to complaints of fatigue and restlessness over the past 2 days. FOBT in the ER negative.      Assessment/Plan:    1. Acute on chronic anemia likely secondary to underlying CKD: FOBT negative on admission  Transfuse 2 units PRBC with IV lasix 40mg between transfusions  Monitor Hb/hct    2. PVCs/Bigemeny: Continue cardiac monitoring  Patient is allergic to BB per daughter at bedside, On procardia  Was taken off Flecanide by cardiology in December  Cardiology/EP to follow up  Monitor electrolytes    3. CKD stage 5: Stable   Monitor BMP    4. Chronic diastolic CHF: Hold torsemide  IV lasix 40mg X1 between transfusions today  Metalozone on Wednesdays    5. Hypertension: Continue hydralazine, nifedepine and clonidine with parameters  Monitor BP    VTE_ SCDS bilaterally

## 2020-02-20 NOTE — ED PROVIDER NOTE - OBJECTIVE STATEMENT
84yo male PMh Coronary Artery Disease, DKD, DM, HTN, bleeding colon polyps presenting from his oncologist's office with hemoglobin of 6.6, a/w shortness of breath. Patient recently admitted to hospital 2 months ago for anemia, found to have GIB 2/2 bleeding polyps which were removed (Dr. Mcghee). Patient's h/h last taked 2 weeks ago which was normal (11.2). Endorses black stools but states that he takes iron. No chest pain. No abdominal pain.

## 2020-02-20 NOTE — ED ADULT TRIAGE NOTE - CHIEF COMPLAINT QUOTE
Patient states that he was told to come to the ED for a hemoglobin of 6.0. Patient denies any bleeding or black stools.

## 2020-02-21 ENCOUNTER — TRANSCRIPTION ENCOUNTER (OUTPATIENT)
Age: 85
End: 2020-02-21

## 2020-02-21 VITALS
RESPIRATION RATE: 18 BRPM | HEART RATE: 90 BPM | SYSTOLIC BLOOD PRESSURE: 152 MMHG | TEMPERATURE: 98 F | DIASTOLIC BLOOD PRESSURE: 54 MMHG | OXYGEN SATURATION: 98 %

## 2020-02-21 LAB
ANION GAP SERPL CALC-SCNC: 21 MMOL/L — HIGH (ref 5–17)
BUN SERPL-MCNC: 99 MG/DL — HIGH (ref 8–20)
CALCIUM SERPL-MCNC: 9.7 MG/DL — SIGNIFICANT CHANGE UP (ref 8.6–10.2)
CHLORIDE SERPL-SCNC: 103 MMOL/L — SIGNIFICANT CHANGE UP (ref 98–107)
CO2 SERPL-SCNC: 18 MMOL/L — LOW (ref 22–29)
CREAT SERPL-MCNC: 4.92 MG/DL — HIGH (ref 0.5–1.3)
GLUCOSE SERPL-MCNC: 196 MG/DL — HIGH (ref 70–99)
HCT VFR BLD CALC: 28.1 % — LOW (ref 39–50)
HGB BLD-MCNC: 9.1 G/DL — LOW (ref 13–17)
MAGNESIUM SERPL-MCNC: 1.9 MG/DL — SIGNIFICANT CHANGE UP (ref 1.8–2.6)
MCHC RBC-ENTMCNC: 29.7 PG — SIGNIFICANT CHANGE UP (ref 27–34)
MCHC RBC-ENTMCNC: 32.4 GM/DL — SIGNIFICANT CHANGE UP (ref 32–36)
MCV RBC AUTO: 91.8 FL — SIGNIFICANT CHANGE UP (ref 80–100)
PLATELET # BLD AUTO: 201 K/UL — SIGNIFICANT CHANGE UP (ref 150–400)
POTASSIUM SERPL-MCNC: 4.5 MMOL/L — SIGNIFICANT CHANGE UP (ref 3.5–5.3)
POTASSIUM SERPL-SCNC: 4.5 MMOL/L — SIGNIFICANT CHANGE UP (ref 3.5–5.3)
RBC # BLD: 3.06 M/UL — LOW (ref 4.2–5.8)
RBC # FLD: 15.2 % — HIGH (ref 10.3–14.5)
SODIUM SERPL-SCNC: 142 MMOL/L — SIGNIFICANT CHANGE UP (ref 135–145)
WBC # BLD: 11.71 K/UL — HIGH (ref 3.8–10.5)
WBC # FLD AUTO: 11.71 K/UL — HIGH (ref 3.8–10.5)

## 2020-02-21 PROCEDURE — 84466 ASSAY OF TRANSFERRIN: CPT

## 2020-02-21 PROCEDURE — 86900 BLOOD TYPING SEROLOGIC ABO: CPT

## 2020-02-21 PROCEDURE — 83540 ASSAY OF IRON: CPT

## 2020-02-21 PROCEDURE — P9016: CPT

## 2020-02-21 PROCEDURE — 80048 BASIC METABOLIC PNL TOTAL CA: CPT

## 2020-02-21 PROCEDURE — 83880 ASSAY OF NATRIURETIC PEPTIDE: CPT

## 2020-02-21 PROCEDURE — 99233 SBSQ HOSP IP/OBS HIGH 50: CPT

## 2020-02-21 PROCEDURE — 99239 HOSP IP/OBS DSCHRG MGMT >30: CPT

## 2020-02-21 PROCEDURE — 93005 ELECTROCARDIOGRAM TRACING: CPT

## 2020-02-21 PROCEDURE — 84484 ASSAY OF TROPONIN QUANT: CPT

## 2020-02-21 PROCEDURE — 83550 IRON BINDING TEST: CPT

## 2020-02-21 PROCEDURE — 85610 PROTHROMBIN TIME: CPT

## 2020-02-21 PROCEDURE — 82272 OCCULT BLD FECES 1-3 TESTS: CPT

## 2020-02-21 PROCEDURE — 71046 X-RAY EXAM CHEST 2 VIEWS: CPT

## 2020-02-21 PROCEDURE — 71046 X-RAY EXAM CHEST 2 VIEWS: CPT | Mod: 26

## 2020-02-21 PROCEDURE — 85027 COMPLETE CBC AUTOMATED: CPT

## 2020-02-21 PROCEDURE — 86850 RBC ANTIBODY SCREEN: CPT

## 2020-02-21 PROCEDURE — 83735 ASSAY OF MAGNESIUM: CPT

## 2020-02-21 PROCEDURE — 99285 EMERGENCY DEPT VISIT HI MDM: CPT | Mod: 25

## 2020-02-21 PROCEDURE — 86923 COMPATIBILITY TEST ELECTRIC: CPT

## 2020-02-21 PROCEDURE — 80053 COMPREHEN METABOLIC PANEL: CPT

## 2020-02-21 PROCEDURE — 85730 THROMBOPLASTIN TIME PARTIAL: CPT

## 2020-02-21 PROCEDURE — G0378: CPT

## 2020-02-21 PROCEDURE — 36415 COLL VENOUS BLD VENIPUNCTURE: CPT

## 2020-02-21 PROCEDURE — 86901 BLOOD TYPING SEROLOGIC RH(D): CPT

## 2020-02-21 PROCEDURE — 36430 TRANSFUSION BLD/BLD COMPNT: CPT

## 2020-02-21 PROCEDURE — 82728 ASSAY OF FERRITIN: CPT

## 2020-02-21 RX ORDER — SODIUM BICARBONATE 1 MEQ/ML
650 SYRINGE (ML) INTRAVENOUS
Refills: 0 | Status: DISCONTINUED | OUTPATIENT
Start: 2020-02-21 | End: 2020-02-21

## 2020-02-21 RX ORDER — FUROSEMIDE 40 MG
20 TABLET ORAL ONCE
Refills: 0 | Status: DISCONTINUED | OUTPATIENT
Start: 2020-02-21 | End: 2020-02-21

## 2020-02-21 RX ADMIN — Medication 0.1 MILLIGRAM(S): at 04:33

## 2020-02-21 RX ADMIN — Medication 1 TABLET(S): at 13:27

## 2020-02-21 RX ADMIN — Medication 90 MILLIGRAM(S): at 04:33

## 2020-02-21 RX ADMIN — Medication 50 MILLIGRAM(S): at 04:33

## 2020-02-21 RX ADMIN — Medication 500 MILLIGRAM(S): at 13:26

## 2020-02-21 RX ADMIN — Medication 50 MILLIGRAM(S): at 13:26

## 2020-02-21 RX ADMIN — PANTOPRAZOLE SODIUM 40 MILLIGRAM(S): 20 TABLET, DELAYED RELEASE ORAL at 04:33

## 2020-02-21 RX ADMIN — Medication 20 MILLIGRAM(S): at 13:26

## 2020-02-21 NOTE — DISCHARGE NOTE NURSING/CASE MANAGEMENT/SOCIAL WORK - PATIENT PORTAL LINK FT
You can access the FollowMyHealth Patient Portal offered by [facility] by registering at the following website: [email or web address]. By joining [employer]’s FollowMyHealth portal, you will also be able to view your health information using other applications (apps) compatible with our system.

## 2020-02-21 NOTE — PROGRESS NOTE ADULT - SUBJECTIVE AND OBJECTIVE BOX
[facility]                                   [address] [place] [address]                                                      PHONE: [phone]                                                         FAX: [phone]  [email or web address]  ---------------------------------------------------------------------------------------------------------------------------------    Overnight events/patient complaints:    Patient feeling well after receiving transfusion     PAST MEDICAL & SURGICAL HISTORY:  Risk factors for obstructive sleep apnea  Anemia  RICHARDS (dyspnea on exertion)  VT (ventricular tachycardia)  HTN (hypertension)  CAD (coronary artery disease)  CKD (chronic kidney disease): stage IV  Arrhythmia  AV block, 1st degree  DM (diabetes mellitus)  H/O carotid endarterectomy: Right  A-V fistula: left arm 5/2017  H/O angioplasty: 2013,  no  intervention  H/O left knee surgery  H/O circumcision: at  age  65      Plavix (Hives)  Toprol-XL (Rash)    MEDICATIONS  (STANDING):  ascorbic acid 500 milliGRAM(s) Oral daily  atorvastatin 40 milliGRAM(s) Oral at bedtime  calcitriol   Capsule 0.25 MICROGram(s) Oral daily  cloNIDine 0.1 milliGRAM(s) Oral daily  hydrALAZINE 50 milliGRAM(s) Oral three times a day  metolazone 2.5 milliGRAM(s) Oral <User Schedule>  multivitamin 1 Tablet(s) Oral daily  NIFEdipine XL 60 milliGRAM(s) Oral at bedtime  NIFEdipine XL 90 milliGRAM(s) Oral <User Schedule>  pantoprazole    Tablet 40 milliGRAM(s) Oral every 12 hours    MEDICATIONS  (PRN):      Vital Signs Last 24 Hrs  T(C): 37.2 (21 Feb 2020 04:28), Max: 37.2 (21 Feb 2020 00:51)  T(F): 98.9 (21 Feb 2020 04:28), Max: 99 (21 Feb 2020 00:51)  HR: 100 (21 Feb 2020 04:28) (86 - 111)  BP: 178/72 (21 Feb 2020 04:28) (137/65 - 178/76)  BP(mean): --  RR: 18 (21 Feb 2020 04:28) (16 - 18)  SpO2: 97% (21 Feb 2020 04:28) (91% - 99%)  ICU Vital Signs Last 24 Hrs  CHRISTIANO ELIZABETH  I&O's Detail    20 Feb 2020 07:01  -  21 Feb 2020 07:00  --------------------------------------------------------  IN:    Oral Fluid: 180 mL    Packed Red Blood Cells: 595 mL  Total IN: 775 mL    OUT:    Voided: 1800 mL  Total OUT: 1800 mL    Total NET: -1025 mL        I&O's Summary    20 Feb 2020 07:01  -  21 Feb 2020 07:00  --------------------------------------------------------  IN: 775 mL / OUT: 1800 mL / NET: -1025 mL      Drug Dosing Weight  CHRISTIANO ELIZABETH      PHYSICAL EXAM:  General: Appears well developed, well nourished alert and cooperative.  HEENT: Head; normocephalic, atraumatic.  Eyes: Pupils reactive, cornea wnl.  Neck: Supple, no nodes adenopathy, no NVD or carotid bruit or thyromegaly.  CARDIOVASCULAR: Normal S1 and S2, DEANA murmur, rub, gallop or lift.   LUNGS: No rales, rhonchi or wheeze. Normal breath sounds bilaterally.  ABDOMEN: Soft, nontender without mass or organomegaly. bowel sounds normoactive.  EXTREMITIES: No clubbing, cyanosis or edema. Distal pulses wnl.   SKIN: warm and dry with normal turgor.  NEURO: Alert/oriented x 3/normal motor exam. No pathologic reflexes.    PSYCH: normal affect.        LABS:                        9.1    11.71 )-----------( 201      ( 21 Feb 2020 06:09 )             28.1     02-21    142  |  103  |  99.0<H>  ----------------------------<  196<H>  4.5   |  18.0<L>  |  4.92<H>    Ca    9.7      21 Feb 2020 06:09  Mg     1.9     02-21    TPro  6.1<L>  /  Alb  3.8  /  TBili  0.2<L>  /  DBili  x   /  AST  20  /  ALT  34  /  AlkPhos  87  02-20    CHRISTIANO ELIZABETH  CARDIAC MARKERS ( 20 Feb 2020 12:30 )  x     / 0.07 ng/mL / x     / x     / x          PT/INR - ( 20 Feb 2020 12:30 )   PT: 12.0 sec;   INR: 1.06 ratio         PTT - ( 20 Feb 2020 12:30 )  PTT:36.4 sec      RADIOLOGY & ADDITIONAL STUDIES:    INTERPRETATION OF TELEMETRY (personally reviewed): PVCs, PACs

## 2020-02-21 NOTE — PROGRESS NOTE ADULT - ASSESSMENT
Pt with CKD stage V, admitted for anemia     1)CKD stage V- not on dialysis  2) Acute on chronic anemia  3) HTN  4) Metabolic acidosis    s/p Aranesp injection this AM at Hematologist's office  2 U PPRBC transfusion, Lasix 40 mg IV x1  Obtain iron panel  GI eval  Bp stable; continue current antihypertensive regimen  Resume home dose of torsemide (20 mg daily)  Continue sodium bicarb po ; monitor serum Co2 levels  Discussed with Dr. Soliz and pt's daughter Vanessa. Pt with CKD stage V, admitted for anemia     1)CKD stage V- not on dialysis  2) Acute on chronic anemia  3) HTN  4) Metabolic acidosis    s/p Aranesp injection yesterday Hematologist's office and 2 U PRBC - Hb stable  Bp stable; continue current antihypertensive regimen  Continue Torsemide  Pt is scheduled to see me in Nephrology clinic next Wednesday.

## 2020-02-21 NOTE — DISCHARGE NOTE PROVIDER - NSDCFUSCHEDAPPT_GEN_ALL_CORE_FT
CHRISTIANO ELIZABETH ; 02/25/2020 ; NPP [facility] [address]  CHRISTIANO ELIZABETH ; 03/05/2020 ; NPP [facility]  CHRISTIANO ELIZABETH ; 03/05/2020 ; [facility]  CHRISTIANO ELIZABETH ; 03/19/2020 ; NPP [facility]

## 2020-02-21 NOTE — DISCHARGE NOTE PROVIDER - NSDCMRMEDTOKEN_GEN_ALL_CORE_FT
Aranesp 100 mcg/0.5 mL injectable solution: 1  injectable every 2 weeks  aspirin 81 mg oral delayed release tablet: 1 tab(s) orally once a day  atorvastatin 40 mg oral tablet: 1 tab(s) orally once a day (at bedtime)  calcitriol 0.25 mcg oral capsule: 1 cap(s) orally once a day  cloNIDine 0.1 mg oral tablet: 1 tab(s) orally once a day   ferrous sulfate 325 mg (65 mg elemental iron) oral delayed release tablet: 1 tab(s) orally 3 times a day  hydrALAZINE 50 mg oral tablet: 1 tab(s) orally 3 times a day  metOLazone 2.5 mg oral tablet: 1 tab(s) orally once a week  Nephro-Thomas oral tablet: 1 tab(s) orally once a day  NIFEdipine 60 mg oral tablet, extended release: 1 tab(s) orally once a day (at bedtime)  NIFEdipine 90 mg oral tablet, extended release: 1 tab(s) orally once a day  pantoprazole 40 mg oral delayed release tablet: 1 tab(s) orally 3 times a day (before meals)  torsemide 20 mg oral tablet: 1 tab(s) orally once a day  Vitamin C 250 mg oral tablet: 1 tab(s) orally once a day  Vitamin D3 50,000 intl units oral capsule: 1 cap(s) orally once a week

## 2020-02-21 NOTE — PROGRESS NOTE ADULT - SUBJECTIVE AND OBJECTIVE BOX
RN got concerned for ??SOB during transfusion.  Found patient sitting by the bedside. States he's frequent urinations after Lasix between Units. VSS  General: NAD  Chest: CTA b/l no rales or wheezing   CV: S1S2, RRR  Abdomen: Benign  Extremities: No edema  Neuro: A&Ox3, Motor intact  Skin: No rashes or hives  A/P:  85y old Male with anemia of chronic disease getting transfused, no contraindications to continue with anemia correction, stable currently. Slow down blood infusion to 60ml/hr   Labs in AM

## 2020-02-21 NOTE — PROGRESS NOTE ADULT - ASSESSMENT
The patient is an 85 year old male with a history of CKD stage 5, anemia on chronic disease and previous GI bleed status post colonoscopy with two ulcerated polyps s/p resections and colonic AVMs, EGD with gastritis, VPCs (11/2019) who was referred from [facility] for anemia. He was noted to have a hemoglobin of 6.6/19.8. On 2/6/20  hb/hct was 11/35. His last dose of Aranesp was 6 weeks prior as his Hb has maintained above 10gm/dl. Patient admits to complaints of fatigue and restlessness over the past 2 days. FOBT in the ER negative. Transfused 2 units of PRBC with lasix with improvement.       Assessment/Plan:    1. Acute on chronic anemia likely secondary to underlying CKD: FOBT negative on admission  Transfused 2 units PRBC; givne a dose of aranesp yesterday  Monitor Hb/hct    2. PVCs/Bigemeny: Continue cardiac monitoring  Patient is allergic to BB   On procardia   Was taken off Flecanide by cardiology in December  Per EP, continue to monitor for now     3. CKD stage 5: Stable   Monitor BMP    4. Chronic diastolic CHF: Resume torsemide today  Metalozone on Wednesdays    5. Hypertension: BP elevated this morning, resume torsemide and repeat   Continue hydralazine, nifedepine and clonidine with parameters  Monitor BP    VTE_ SCDS bilaterally     Patient clinically stable; will consider discharge home later today if BP and HR improved

## 2020-02-21 NOTE — PROGRESS NOTE ADULT - SUBJECTIVE AND OBJECTIVE BOX
CC: Follow up Anemia     INTERVAL HPI/OVERNIGHT EVENTS: Patient seen and examined, feels well this morning. Denies feeling dizzy or lightheaded. Denies sob, RICHARDS or chest pain. Felt short of breath last night during second PRBC transfusion. PVCS overnight, mg was replaced.       Vital Signs Last 24 Hrs  T(C): 37.1 (21 Feb 2020 09:51), Max: 37.2 (21 Feb 2020 00:51)  T(F): 98.7 (21 Feb 2020 09:51), Max: 99 (21 Feb 2020 00:51)  HR: 91 (21 Feb 2020 09:51) (86 - 111)  BP: 160/74 (21 Feb 2020 09:51) (137/65 - 178/76)  BP(mean): --  RR: 18 (21 Feb 2020 04:28) (16 - 18)  SpO2: 97% (21 Feb 2020 04:28) (91% - 99%)    PHYSICAL EXAM:    GENERAL: NAD, AOX3  HEAD:  Atraumatic, Normocephalic  ENMT: Moist mucous membranes  NECK: Supple, No JVD  CHEST/LUNG: Clear to auscultation bilaterally; No rales, rhonchi, wheezing, or rubs  HEART: Regular rate and rhythm; No murmurs, rubs, or gallops  ABDOMEN: Soft, Nontender, Nondistended; Bowel sounds present  EXTREMITIES:  2+ Peripheral Pulses, No clubbing, cyanosis, or edema        MEDICATIONS  (STANDING):  ascorbic acid 500 milliGRAM(s) Oral daily  atorvastatin 40 milliGRAM(s) Oral at bedtime  calcitriol   Capsule 0.25 MICROGram(s) Oral daily  cloNIDine 0.1 milliGRAM(s) Oral daily  hydrALAZINE 50 milliGRAM(s) Oral three times a day  metolazone 2.5 milliGRAM(s) Oral <User Schedule>  multivitamin 1 Tablet(s) Oral daily  NIFEdipine XL 60 milliGRAM(s) Oral at bedtime  NIFEdipine XL 90 milliGRAM(s) Oral <User Schedule>  pantoprazole    Tablet 40 milliGRAM(s) Oral every 12 hours  torsemide 20 milliGRAM(s) Oral daily    MEDICATIONS  (PRN):      Allergies    Plavix (Hives)  Toprol-XL (Rash)    Intolerances          LABS:                          9.1    11.71 )-----------( 201      ( 21 Feb 2020 06:09 )             28.1     02-21    142  |  103  |  99.0<H>  ----------------------------<  196<H>  4.5   |  18.0<L>  |  4.92<H>    Ca    9.7      21 Feb 2020 06:09  Mg     1.9     02-21    TPro  6.1<L>  /  Alb  3.8  /  TBili  0.2<L>  /  DBili  x   /  AST  20  /  ALT  34  /  AlkPhos  87  02-20    PT/INR - ( 20 Feb 2020 12:30 )   PT: 12.0 sec;   INR: 1.06 ratio         PTT - ( 20 Feb 2020 12:30 )  PTT:36.4 sec      RADIOLOGY & ADDITIONAL TESTS:

## 2020-02-21 NOTE — PROGRESS NOTE ADULT - SUBJECTIVE AND OBJECTIVE BOX
[facility] DIVISION OF KIDNEY DISEASES AND HYPERTENSION -- FOLLOW UP NOTE  --------------------------------------------------------------------------------  Chief Complaint:    24 hour events/subjective:        PAST HISTORY  --------------------------------------------------------------------------------  No significant changes to PMH, PSH, FHx, SHx, unless otherwise noted    ALLERGIES & MEDICATIONS  --------------------------------------------------------------------------------  Allergies    Plavix (Hives)  Toprol-XL (Rash)    Intolerances      Standing Inpatient Medications  ascorbic acid 500 milliGRAM(s) Oral daily  atorvastatin 40 milliGRAM(s) Oral at bedtime  calcitriol   Capsule 0.25 MICROGram(s) Oral daily  cloNIDine 0.1 milliGRAM(s) Oral daily  hydrALAZINE 50 milliGRAM(s) Oral three times a day  metolazone 2.5 milliGRAM(s) Oral <User Schedule>  multivitamin 1 Tablet(s) Oral daily  NIFEdipine XL 60 milliGRAM(s) Oral at bedtime  NIFEdipine XL 90 milliGRAM(s) Oral <User Schedule>  pantoprazole    Tablet 40 milliGRAM(s) Oral every 12 hours  sodium bicarbonate 650 milliGRAM(s) Oral two times a day  torsemide 20 milliGRAM(s) Oral daily    PRN Inpatient Medications      REVIEW OF SYSTEMS  --------------------------------------------------------------------------------  Gen: No weight changes, fatigue, fevers/chills, weakness  Skin: No rashes  Head/Eyes/Ears/Mouth: No headache; Normal hearing; Normal vision w/o blurriness; No sinus pain/discomfort, sore throat  Respiratory: No dyspnea, cough, wheezing, hemoptysis  CV: No chest pain, PND, orthopnea  GI: No abdominal pain, diarrhea, constipation, nausea, vomiting, melena, hematochezia  : No increased frequency, dysuria, hematuria, nocturia  MSK: No joint pain/swelling; no back pain; no edema  Neuro: No dizziness/lightheadedness, weakness, seizures, numbness, tingling  Heme: No easy bruising or bleeding  Endo: No heat/cold intolerance  Psych: No significant nervousness, anxiety, stress, depression    All other systems were reviewed and are negative, except as noted.    VITALS/PHYSICAL EXAM  --------------------------------------------------------------------------------  T(C): 37.1 (02-21-20 @ 09:51), Max: 37.2 (02-21-20 @ 00:51)  HR: 95 (02-21-20 @ 13:30) (86 - 111)  BP: 162/64 (02-21-20 @ 13:30) (137/65 - 178/76)  RR: 18 (02-21-20 @ 04:28) (16 - 18)  SpO2: 97% (02-21-20 @ 04:28) (91% - 99%)  Wt(kg): --  Height (cm): 165.1 (02-20-20 @ 11:22)  Weight (kg): 68 (02-20-20 @ 11:22)  BMI (kg/m2): 24.9 (02-20-20 @ 11:22)  BSA (m2): 1.75 (02-20-20 @ 11:22)      02-20-20 @ 07:01  -  02-21-20 @ 07:00  --------------------------------------------------------  IN: 775 mL / OUT: 1800 mL / NET: -1025 mL      Physical Exam:  	Gen: NAD, well-appearing  	HEENT: PERRL, supple neck, clear oropharynx  	Pulm: CTA B/L  	CV: RRR, S1S2; no rub  	Back: No spinal or CVA tenderness; no sacral edema  	Abd: +BS, soft, nontender/nondistended  	: No suprapubic tenderness  	UE: Warm, FROM, no clubbing, intact strength; no edema; no asterixis  	LE: Warm, FROM, no clubbing, intact strength; no edema  	Neuro: No focal deficits, intact gait  	Psych: Normal affect and mood  	Skin: Warm, without rashes  	Vascular access:    LABS/STUDIES  --------------------------------------------------------------------------------              9.1    11.71 >-----------<  201      [02-21-20 @ 06:09]              28.1     142  |  103  |  99.0  ----------------------------<  196      [02-21-20 @ 06:09]  4.5   |  18.0  |  4.92        Ca     9.7     [02-21-20 @ 06:09]      Mg     1.9     [02-21-20 @ 06:09]    TPro  6.1  /  Alb  3.8  /  TBili  0.2  /  DBili  x   /  AST  20  /  ALT  34  /  AlkPhos  87  [02-20-20 @ 12:30]    PT/INR: PT 12.0 , INR 1.06       [02-20-20 @ 12:30]  PTT: 36.4       [02-20-20 @ 12:30]    Troponin 0.07      [02-20-20 @ 12:30]    Creatinine Trend:  SCr 4.92 [02-21 @ 06:09]  SCr 4.83 [02-20 @ 12:30]        Iron 79, TIBC 289, %sat 27      [02-20-20 @ 21:56]  Ferritin 248      [02-20-20 @ 21:56]  HbA1c 4.9      [08-09-18 @ 05:54] [facility] DIVISION OF KIDNEY DISEASES AND HYPERTENSION -- FOLLOW UP NOTE  --------------------------------------------------------------------------------  Chief Complaint: CKD stage V  24 hour events/subjective:  s/p 2 U PRBC transfusion; Hb improved  Pt seen and examined; feels well      PAST HISTORY  --------------------------------------------------------------------------------  No significant changes to PMH, PSH, FHx, SHx, unless otherwise noted    ALLERGIES & MEDICATIONS  --------------------------------------------------------------------------------  Allergies    Plavix (Hives)  Toprol-XL (Rash)    Intolerances      Standing Inpatient Medications  ascorbic acid 500 milliGRAM(s) Oral daily  atorvastatin 40 milliGRAM(s) Oral at bedtime  calcitriol   Capsule 0.25 MICROGram(s) Oral daily  cloNIDine 0.1 milliGRAM(s) Oral daily  hydrALAZINE 50 milliGRAM(s) Oral three times a day  metolazone 2.5 milliGRAM(s) Oral <User Schedule>  multivitamin 1 Tablet(s) Oral daily  NIFEdipine XL 60 milliGRAM(s) Oral at bedtime  NIFEdipine XL 90 milliGRAM(s) Oral <User Schedule>  pantoprazole    Tablet 40 milliGRAM(s) Oral every 12 hours  sodium bicarbonate 650 milliGRAM(s) Oral two times a day  torsemide 20 milliGRAM(s) Oral daily    PRN Inpatient Medications      REVIEW OF SYSTEMS  --------------------------------------------------------------------------------  Gen: No weight changes, fatigue, fevers/chills, weakness  Skin: No rashes  Head/Eyes/Ears/Mouth: No headache; Normal hearing; Normal vision w/o blurriness; No sinus pain/discomfort, sore throat  Respiratory: No dyspnea, cough, wheezing, hemoptysis  CV: No chest pain, PND, orthopnea  GI: No abdominal pain, diarrhea, constipation, nausea, vomiting, melena, hematochezia  : No increased frequency, dysuria, hematuria, nocturia  MSK: No joint pain/swelling; no back pain; no edema  Neuro: No dizziness/lightheadedness, weakness, seizures, numbness, tingling  Heme: No easy bruising or bleeding  Endo: No heat/cold intolerance  Psych: No significant nervousness, anxiety, stress, depression    All other systems were reviewed and are negative, except as noted.    VITALS/PHYSICAL EXAM  --------------------------------------------------------------------------------  T(C): 37.1 (02-21-20 @ 09:51), Max: 37.2 (02-21-20 @ 00:51)  HR: 95 (02-21-20 @ 13:30) (86 - 111)  BP: 162/64 (02-21-20 @ 13:30) (137/65 - 178/76)  RR: 18 (02-21-20 @ 04:28) (16 - 18)  SpO2: 97% (02-21-20 @ 04:28) (91% - 99%)  Wt(kg): --  Height (cm): 165.1 (02-20-20 @ 11:22)  Weight (kg): 68 (02-20-20 @ 11:22)  BMI (kg/m2): 24.9 (02-20-20 @ 11:22)  BSA (m2): 1.75 (02-20-20 @ 11:22)      02-20-20 @ 07:01  -  02-21-20 @ 07:00  --------------------------------------------------------  IN: 775 mL / OUT: 1800 mL / NET: -1025 mL      Physical Exam:  Gen: NAD,pale  	HEENT: supple neck  	Pulm: CTA B/L  	CV: RRR, S1S2; no rub  	Back: No spinal or CVA tenderness  	Abd: +BS, soft, nontender/nondistended  	: No suprapubic tenderness  	UE: Warm,  no edema; no asterixis  	LE: Warm,  no edema  	Neuro: No focal deficits  	Psych: Normal affect and mood  	Skin: Warm, without rashes  	Vascular access: BARBRA TONEY+    LABS/STUDIES  --------------------------------------------------------------------------------              9.1    11.71 >-----------<  201      [02-21-20 @ 06:09]              28.1     142  |  103  |  99.0  ----------------------------<  196      [02-21-20 @ 06:09]  4.5   |  18.0  |  4.92        Ca     9.7     [02-21-20 @ 06:09]      Mg     1.9     [02-21-20 @ 06:09]    TPro  6.1  /  Alb  3.8  /  TBili  0.2  /  DBili  x   /  AST  20  /  ALT  34  /  AlkPhos  87  [02-20-20 @ 12:30]    PT/INR: PT 12.0 , INR 1.06       [02-20-20 @ 12:30]  PTT: 36.4       [02-20-20 @ 12:30]    Troponin 0.07      [02-20-20 @ 12:30]    Creatinine Trend:  SCr 4.92 [02-21 @ 06:09]  SCr 4.83 [02-20 @ 12:30]        Iron 79, TIBC 289, %sat 27      [02-20-20 @ 21:56]  Ferritin 248      [02-20-20 @ 21:56]  HbA1c 4.9      [08-09-18 @ 05:54]

## 2020-02-21 NOTE — DISCHARGE NOTE PROVIDER - CARE PROVIDER_API CALL
Mango Peterson (MD)  Cardiovascular Disease; Internal Medicine  [address] [place] [address]  Phone: [phone]  Fax: [phone]  Follow Up Time:

## 2020-02-21 NOTE — DISCHARGE NOTE PROVIDER - HOSPITAL COURSE
The patient is an 85 year old male with a history of CKD stage 5, anemia on chronic disease and previous GI bleed status post colonoscopy with two ulcerated polyps s/p resections and colonic AVMs, EGD with gastritis, VPCs (11/2019) who was referred from [facility] for anemia. He was noted to have a hemoglobin of 6.6/19.8. On 2/6/20  hb/hct was 11/35. His last dose of Aranesp was 6 weeks prior as his Hb has maintained above 10gm/dl. Patient admits to complaints of fatigue and restlessness over the past 2 days. FOBT in the ER negative. Transfused 2 units of PRBC with lasix with improvement.         48 mins spent

## 2020-02-21 NOTE — DISCHARGE NOTE PROVIDER - NSDCCPCAREPLAN_GEN_ALL_CORE_FT
PRINCIPAL DISCHARGE DIAGNOSIS  Diagnosis: Anemia  Assessment and Plan of Treatment: Status post 2 units PRBC  TO continue to follow up with hematology for aranesp as an outpatient        SECONDARY DISCHARGE DIAGNOSES  Diagnosis: CKD (chronic kidney disease) stage 5, GFR less than 15 ml/min  Assessment and Plan of Treatment: Renal function stable  Continue outpatient follow up with nephrology

## 2020-02-21 NOTE — PROGRESS NOTE ADULT - ASSESSMENT
ASSESSMENT AND PLAN:  In summary, CHRISTIANO ELIZABETH is an 85y Male with past medical history significant for mild AS, cad, diet controlled DM, HTN was admitted 6 weeks ago with GI bleed and found to have bleeding polyps presents with recurrent GI bleed.    PVCs- improving.  Can add beta blocker if burden increases      Aortic Stenosis- mild on last echo.  Can repeat echo in the future to continue to track progression of valve disease.  Usually would be at 3-5 year intervals.    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  Unclear if this is now a fixed problem or if it would improve with HD     Thank you for allowing [facility] to participate in the care of this patient.  Please feel free to call us back with any questions or concerns.

## 2020-02-25 ENCOUNTER — APPOINTMENT (OUTPATIENT)
Dept: NEPHROLOGY | Facility: CLINIC | Age: 85
End: 2020-02-25
Payer: MEDICARE

## 2020-02-25 VITALS
BODY MASS INDEX: 25.33 KG/M2 | HEIGHT: 65 IN | WEIGHT: 152 LBS | DIASTOLIC BLOOD PRESSURE: 70 MMHG | SYSTOLIC BLOOD PRESSURE: 133 MMHG | HEART RATE: 92 BPM

## 2020-02-25 DIAGNOSIS — E87.2 ACIDOSIS: ICD-10-CM

## 2020-02-25 DIAGNOSIS — Z86.79 PERSONAL HISTORY OF OTHER DISEASES OF THE CIRCULATORY SYSTEM: ICD-10-CM

## 2020-02-25 PROCEDURE — 36415 COLL VENOUS BLD VENIPUNCTURE: CPT

## 2020-02-25 PROCEDURE — 99215 OFFICE O/P EST HI 40 MIN: CPT | Mod: 25

## 2020-02-25 NOTE — HISTORY OF PRESENT ILLNESS
[FreeTextEntry1] : 85 year old man with CKD stage V presenting for followup after hospital admission for CHF exacerbation. Pt was discharged from the hospital on 12/22.\par Pt is here today with his daughters Vanessa and Angélica Solorzano.\par Pt states he feels well. He  saw Dr. Peterson last Friday. He has been off Flecainide without any issues. BP regimen was changed to Hydralazine 50 mg tid (in concern of ? lupus as per daughter) . He is on clonidine 0.1 mg daily. \par Hb was 8.6 on discharge, got Aranesp during hospitalization. He is scheduled to see Dr. Worthington on Thursday.\par Pt is on Torsemide 40 mg daily; taking Metolazone 5 mg every Wednesday; last dose was on Christmas.\par Pt has been weighing himself every day; his weight when he went to the hospital was 160 lbs and has decreased to 148 lately. \par Pt states he feels 'dry'. Also c/o 'leg cramps and Rakan horses'.\par Urination has unchanged\par  Feels tired but denies dyspnea, orthopnea, leg edema.\par Walks around the house and goes up and down the stairs.\par He has been eating three meals a day and a midafternoon snack.\par He has also been  eating bananas occasionally for low K+.\par Denies nausea, vomiting, diarrhea, metallic taste in mouth. Reports constipation. \par Complaining of blurry vision.\par Asking if sodium bicarb dose can be decreased; he is supposed to take 650 mg (2 tabs tid) and is not happy with the pill burden.\par Also supposed to take Sevelamer 2 tabs tid with meals but he is taking 1 pill. \par -----------------------------------------------------------------------------------------------------\par \par 01/28/2020\par Today,\par Pt presents for a follow up\par No interval illness/ hospitalization \par States he feels 'great'. States he has been eating and sleeping well.\par Denies issues with breathing, leg edema\par he is on Torsemide 20 mg daily and Metolazone 2.5 mg once a week\par Weights are stable.\par Saw Dr. Worthington recently, Hb was 11.4. Didnt get Aranesp as Hb was at goal.\par ----------------------------------------------------------------------------------------------------------------------------\par \par 02/25/2020\par Today,\par Pt presents for a follow up appt\par Recently admitted to [facility] for acute worsening of anemia\par s/p 2 U PRBC transfusion\par Pt accompanied by daughter who reports pt has been feeling very tired lately\par Denies nausea, vomiting, decreased urination\par Reports he hasn’t had a BM in about 5 days\par \par

## 2020-02-25 NOTE — ASSESSMENT
[FreeTextEntry1] : CKD V\par Advanced CKD with LUE AVF\par Family has refused dialysis in past\par Repeat renal panel\par \par Acute on chronic anemia\par sees Dr. Baker; gets Aranesp injections\par s/p 2 U PRBC during recent hospitalization\par Repeat CBC today\par \par HTN/Volume\par BP stable\par Pt euvolemic\par Continue Torsemide 20 mg  qd and Metolazone 2.5 mg once a week\par Advised to check daily weights\par \par Metabolic acidosis\par Continue sodium bicarb 650 mg tid\par \par CKD- MBD\par Last phos level stable at 4.5 on Sevelamer 800 ; continue same dose for now.\par \par Constipation\par Send script for colace\par will take Miralax tonight\par \par RTC in 1 month.\par \par

## 2020-02-25 NOTE — REVIEW OF SYSTEMS
[Fever] : no fever [Chills] : no chills [Feeling Poorly] : feeling poorly [Feeling Tired] : feeling tired [Recent Weight Loss (___ Lbs)] : no recent weight loss [Eye Pain] : no eye pain [Earache] : no earache [Nosebleeds] : no nosebleeds [Nasal Discharge] : no nasal discharge [Sore Throat] : no sore throat [Hoarseness] : no hoarseness [Heart Rate Is Slow] : the heart rate was not slow [Heart Rate Is Fast] : the heart rate was not fast [Chest Pain] : no chest pain [Palpitations] : no palpitations [Shortness Of Breath] : no shortness of breath [Lower Ext Edema] : no extremity edema [SOB on Exertion] : no shortness of breath during exertion [Cough] : no cough [Vomiting] : no vomiting [Abdominal Pain] : no abdominal pain [Orthopnea] : no orthopnea [Dysuria] : no dysuria [Constipation] : constipation [Incontinence] : no incontinence [Hesitancy] : no urinary hesitancy [Arthralgias] : no arthralgias [Joint Pain] : no joint pain [Joint Swelling] : no joint swelling [Skin Lesions] : no skin lesions [Joint Stiffness] : no joint stiffness [Itching] : no itching [Fainting] : no fainting [Dizziness] : no dizziness [Limb Weakness] : no limb weakness [Anxiety] : no anxiety [Depression] : no depression [Difficulty Walking] : no difficulty walking [Easy Bleeding] : no tendency for easy bleeding [Easy Bruising] : no tendency for easy bruising

## 2020-02-25 NOTE — PHYSICAL EXAM
[General Appearance - Alert] : alert [Strabismus] : no strabismus was seen [Outer Ear] : the ears and nose were normal in appearance [Examination Of The Oral Cavity] : the lips and gums were normal [Neck Appearance] : the appearance of the neck was normal [Auscultation Breath Sounds / Voice Sounds] : lungs were clear to auscultation bilaterally [Heart Sounds] : normal S1 and S2 [Bowel Sounds] : normal bowel sounds [Edema] : there was no peripheral edema [Abdomen Tenderness] : non-tender [No CVA Tenderness] : no ~M costovertebral angle tenderness [___ (cm) Fistula] : [unfilled] (cm) fistula [Nail Clubbing] : no clubbing  or cyanosis of the fingernails [No Focal Deficits] : no focal deficits [] : no rash [Oriented To Time, Place, And Person] : oriented to person, place, and time [Impaired Insight] : insight and judgment were intact [Affect] : the affect was normal [FreeTextEntry1] : thin, elderly man- pale

## 2020-02-27 ENCOUNTER — OUTPATIENT (OUTPATIENT)
Dept: OUTPATIENT SERVICES | Facility: HOSPITAL | Age: 85
LOS: 1 days | Discharge: HOME | End: 2020-02-27

## 2020-02-27 DIAGNOSIS — Z98.62 PERIPHERAL VASCULAR ANGIOPLASTY STATUS: Chronic | ICD-10-CM

## 2020-02-27 DIAGNOSIS — Z98.890 OTHER SPECIFIED POSTPROCEDURAL STATES: Chronic | ICD-10-CM

## 2020-02-27 DIAGNOSIS — I77.0 ARTERIOVENOUS FISTULA, ACQUIRED: Chronic | ICD-10-CM

## 2020-02-27 DIAGNOSIS — D63.1 ANEMIA IN CHRONIC KIDNEY DISEASE: ICD-10-CM

## 2020-02-27 DIAGNOSIS — N18.5 CHRONIC KIDNEY DISEASE, STAGE 5: ICD-10-CM

## 2020-02-27 LAB
ALBUMIN SERPL ELPH-MCNC: 3.6 G/DL
ANION GAP SERPL CALC-SCNC: 22 MMOL/L
BASOPHILS # BLD AUTO: 0.09 K/UL
BASOPHILS NFR BLD AUTO: 1.2 %
BUN SERPL-MCNC: 104 MG/DL
CALCIUM SERPL-MCNC: 9.9 MG/DL
CHLORIDE SERPL-SCNC: 100 MMOL/L
CO2 SERPL-SCNC: 19 MMOL/L
CREAT SERPL-MCNC: 5.71 MG/DL
EOSINOPHIL # BLD AUTO: 0.2 K/UL
EOSINOPHIL NFR BLD AUTO: 2.6 %
GLUCOSE SERPL-MCNC: 183 MG/DL
HCT VFR BLD CALC: 27.5 %
HGB BLD-MCNC: 8.3 G/DL
IMM GRANULOCYTES NFR BLD AUTO: 1.3 %
LYMPHOCYTES # BLD AUTO: 0.58 K/UL
LYMPHOCYTES NFR BLD AUTO: 7.7 %
MAN DIFF?: NORMAL
MCHC RBC-ENTMCNC: 29.5 PG
MCHC RBC-ENTMCNC: 30.2 GM/DL
MCV RBC AUTO: 97.9 FL
MONOCYTES # BLD AUTO: 0.76 K/UL
MONOCYTES NFR BLD AUTO: 10.1 %
NEUTROPHILS # BLD AUTO: 5.83 K/UL
NEUTROPHILS NFR BLD AUTO: 77.1 %
PHOSPHATE SERPL-MCNC: 4 MG/DL
PLATELET # BLD AUTO: 233 K/UL
POTASSIUM SERPL-SCNC: 5.1 MMOL/L
RBC # BLD: 2.81 M/UL
RBC # FLD: 15.4 %
SODIUM SERPL-SCNC: 141 MMOL/L
WBC # FLD AUTO: 7.56 K/UL

## 2020-03-02 ENCOUNTER — EMERGENCY (EMERGENCY)
Facility: HOSPITAL | Age: 85
LOS: 1 days | Discharge: HOME | End: 2020-03-02
Attending: EMERGENCY MEDICINE
Payer: MEDICARE

## 2020-03-02 ENCOUNTER — APPOINTMENT (OUTPATIENT)
Dept: HEMATOLOGY ONCOLOGY | Facility: CLINIC | Age: 85
End: 2020-03-02

## 2020-03-02 VITALS
DIASTOLIC BLOOD PRESSURE: 64 MMHG | SYSTOLIC BLOOD PRESSURE: 149 MMHG | WEIGHT: 147.05 LBS | OXYGEN SATURATION: 98 % | TEMPERATURE: 98 F | HEIGHT: 63 IN | RESPIRATION RATE: 16 BRPM | HEART RATE: 80 BPM

## 2020-03-02 VITALS
HEART RATE: 76 BPM | SYSTOLIC BLOOD PRESSURE: 153 MMHG | DIASTOLIC BLOOD PRESSURE: 78 MMHG | TEMPERATURE: 98 F | OXYGEN SATURATION: 99 % | RESPIRATION RATE: 20 BRPM

## 2020-03-02 DIAGNOSIS — I77.0 ARTERIOVENOUS FISTULA, ACQUIRED: Chronic | ICD-10-CM

## 2020-03-02 DIAGNOSIS — Z98.62 PERIPHERAL VASCULAR ANGIOPLASTY STATUS: Chronic | ICD-10-CM

## 2020-03-02 DIAGNOSIS — Z98.890 OTHER SPECIFIED POSTPROCEDURAL STATES: Chronic | ICD-10-CM

## 2020-03-02 LAB
ALBUMIN SERPL ELPH-MCNC: 3.7 G/DL — SIGNIFICANT CHANGE UP (ref 3.3–5.2)
ALP SERPL-CCNC: 90 U/L — SIGNIFICANT CHANGE UP (ref 40–120)
ALT FLD-CCNC: 65 U/L — HIGH
ANION GAP SERPL CALC-SCNC: 19 MMOL/L — HIGH (ref 5–17)
AST SERPL-CCNC: 27 U/L — SIGNIFICANT CHANGE UP
BILIRUB SERPL-MCNC: 0.4 MG/DL — SIGNIFICANT CHANGE UP (ref 0.4–2)
BLD GP AB SCN SERPL QL: SIGNIFICANT CHANGE UP
BUN SERPL-MCNC: 79 MG/DL — HIGH (ref 8–20)
CALCIUM SERPL-MCNC: 9.6 MG/DL — SIGNIFICANT CHANGE UP (ref 8.6–10.2)
CHLORIDE SERPL-SCNC: 100 MMOL/L — SIGNIFICANT CHANGE UP (ref 98–107)
CO2 SERPL-SCNC: 24 MMOL/L — SIGNIFICANT CHANGE UP (ref 22–29)
CREAT SERPL-MCNC: 5.53 MG/DL — HIGH (ref 0.5–1.3)
GLUCOSE SERPL-MCNC: 207 MG/DL — HIGH (ref 70–99)
HCT VFR BLD CALC: 27.6 % — LOW (ref 39–50)
HGB BLD-MCNC: 8.7 G/DL — LOW (ref 13–17)
INR BLD: 1.09 RATIO — SIGNIFICANT CHANGE UP (ref 0.88–1.16)
MCHC RBC-ENTMCNC: 29.9 PG — SIGNIFICANT CHANGE UP (ref 27–34)
MCHC RBC-ENTMCNC: 31.5 GM/DL — LOW (ref 32–36)
MCV RBC AUTO: 94.8 FL — SIGNIFICANT CHANGE UP (ref 80–100)
OB PNL STL: NEGATIVE — SIGNIFICANT CHANGE UP
PLATELET # BLD AUTO: 239 K/UL — SIGNIFICANT CHANGE UP (ref 150–400)
POTASSIUM SERPL-MCNC: 4.4 MMOL/L — SIGNIFICANT CHANGE UP (ref 3.5–5.3)
POTASSIUM SERPL-SCNC: 4.4 MMOL/L — SIGNIFICANT CHANGE UP (ref 3.5–5.3)
PROT SERPL-MCNC: 6.5 G/DL — LOW (ref 6.6–8.7)
PROTHROM AB SERPL-ACNC: 12.3 SEC — SIGNIFICANT CHANGE UP (ref 10–12.9)
RBC # BLD: 2.91 M/UL — LOW (ref 4.2–5.8)
RBC # FLD: 16.1 % — HIGH (ref 10.3–14.5)
SODIUM SERPL-SCNC: 143 MMOL/L — SIGNIFICANT CHANGE UP (ref 135–145)
WBC # BLD: 7.71 K/UL — SIGNIFICANT CHANGE UP (ref 3.8–10.5)
WBC # FLD AUTO: 7.71 K/UL — SIGNIFICANT CHANGE UP (ref 3.8–10.5)

## 2020-03-02 PROCEDURE — 93010 ELECTROCARDIOGRAM REPORT: CPT

## 2020-03-02 PROCEDURE — 71045 X-RAY EXAM CHEST 1 VIEW: CPT | Mod: 26

## 2020-03-02 PROCEDURE — 82272 OCCULT BLD FECES 1-3 TESTS: CPT

## 2020-03-02 PROCEDURE — 36415 COLL VENOUS BLD VENIPUNCTURE: CPT

## 2020-03-02 PROCEDURE — 99284 EMERGENCY DEPT VISIT MOD MDM: CPT

## 2020-03-02 PROCEDURE — 86900 BLOOD TYPING SEROLOGIC ABO: CPT

## 2020-03-02 PROCEDURE — 36430 TRANSFUSION BLD/BLD COMPNT: CPT

## 2020-03-02 PROCEDURE — 71045 X-RAY EXAM CHEST 1 VIEW: CPT

## 2020-03-02 PROCEDURE — 86901 BLOOD TYPING SEROLOGIC RH(D): CPT

## 2020-03-02 PROCEDURE — 85027 COMPLETE CBC AUTOMATED: CPT

## 2020-03-02 PROCEDURE — 93005 ELECTROCARDIOGRAM TRACING: CPT

## 2020-03-02 PROCEDURE — 99285 EMERGENCY DEPT VISIT HI MDM: CPT

## 2020-03-02 PROCEDURE — 85610 PROTHROMBIN TIME: CPT

## 2020-03-02 PROCEDURE — 86923 COMPATIBILITY TEST ELECTRIC: CPT

## 2020-03-02 PROCEDURE — 99285 EMERGENCY DEPT VISIT HI MDM: CPT | Mod: 25

## 2020-03-02 PROCEDURE — 80053 COMPREHEN METABOLIC PANEL: CPT

## 2020-03-02 PROCEDURE — 86850 RBC ANTIBODY SCREEN: CPT

## 2020-03-02 PROCEDURE — P9016: CPT

## 2020-03-02 RX ORDER — DARBEPOETIN ALFA IN POLYSORBAT 200MCG/0.4
100 PEN INJECTOR (ML) SUBCUTANEOUS ONCE
Refills: 0 | Status: COMPLETED | OUTPATIENT
Start: 2020-03-02 | End: 2020-03-02

## 2020-03-02 RX ADMIN — Medication 100 MICROGRAM(S): at 15:49

## 2020-03-02 NOTE — ED ADULT NURSE NOTE - OBJECTIVE STATEMENT
sent for low HGB, RF with cardiac history. Here to see if transfusion needed. Lt arm fistula never used yet. Weak and sometimes dizzy, no CP, no dyspnea

## 2020-03-02 NOTE — ED ADULT NURSE NOTE - NSIMPLEMENTINTERV_GEN_ALL_ED
Implemented All Fall with Harm Risk Interventions:  [place] to call system. Call bell, personal items and telephone within reach. Instruct patient to call for assistance. Room bathroom lighting operational. Non-slip footwear when patient is off stretcher. Physically safe environment: no spills, clutter or unnecessary equipment. Stretcher in lowest position, wheels locked, appropriate side rails in place. Provide visual cue, wrist band, yellow gown, etc. Monitor gait and stability. Monitor for mental status changes and reorient to person, place, and time. Review medications for side effects contributing to fall risk. Reinforce activity limits and safety measures with patient and family. Provide visual clues: red socks.

## 2020-03-02 NOTE — CONSULT NOTE ADULT - ASSESSMENT
1) CKD stage V; s/p LUE AVF- not on HD  2) Anemia of CKD  3) HTN    Pt with anemia of CKD; gets Aranesp injections at Dr. Baker's office  Recently with worsening anemia; ? occult bleeding from AVMs as per GI note on last admission and may need further work up  Will give 1 U PRBC and Aranesp injection  Hematology and GI follow up outpt  I will see pt in clinic in 2 weeks.  Discussed with Dr. Manriquez

## 2020-03-02 NOTE — ED PROVIDER NOTE - PHYSICAL EXAMINATION
Vaccine Information Statement(s) for was given today. This has been reviewed, questions answered, and verbal consent given by Parent for injection(s) and administration of Meningococcal .    
Mild fatigue

## 2020-03-02 NOTE — ED PROVIDER NOTE - CLINICAL SUMMARY MEDICAL DECISION MAKING FREE TEXT BOX
The patient presents with fatigue and mild generalized weakness similar to his symptoms when he has anemia and will give one unit and discharge.

## 2020-03-02 NOTE — ED PROVIDER NOTE - OBJECTIVE STATEMENT
86yo male PMh Coronary Artery Disease, DKD, DM, HTN, bleeding colon polyps presents with low H/H sent from Nephrology for H/H of 8.2  No SOB, Mild fatigue, No generalized weakness, No CP  The patient had similar episodes in the past being follow closely by Nephrologist and GI

## 2020-03-02 NOTE — ED PROVIDER NOTE - CHPI ED SYMPTOMS NEG
no headache/no loss of consciousness/no nausea/no vomiting/no back pain/no chills/no decreased eating/drinking/no fever/no pain

## 2020-03-02 NOTE — ED PROVIDER NOTE - PATIENT PORTAL LINK FT
You can access the FollowMyHealth Patient Portal offered by [facility] by registering at the following website: [email or web address]. By joining [facility]’s FollowMyHealth portal, you will also be able to view your health information using other applications (apps) compatible with our system.

## 2020-03-02 NOTE — ED ADULT NURSE NOTE - SUICIDE SCREENING QUESTION 2
Telephone Encounter by Jamila Lee, RN, BSN at 05/25/18 08:49 AM     Author:  Jamila Lee RN, BSN Service:  (none) Author Type:  Registered Nurse     Filed:  05/25/18 08:51 AM Encounter Date:  5/25/2018 Status:  Signed     :  Jamila Lee RN, BSN (Registered Nurse)            Patient of[SC1.1M] Alon Wilian RODNEY[SC1.1T]. Routed to covering provider for review: Dr. Otero.[SC1.1M]     [facility]MENTEROSY    Patient Age: 75 year old   Refill request by:[SC1.1T] Pharmacy fax[SC1.1M]  Refill to be:[SC1.1T] ePrescribed to[SC1.1M]   Pharmacy     [address] [address]    Phone: [phone][SC1.2T]          Medication requested to be refilled:   Requested Prescriptions     Pending Prescriptions Disp Refills   • lamotrigine (LAMICTAL) 100 MG tablet [Pharmacy Med Name: LAMOTRIGINE 100MG TABLETS] 180 Tab 0     Sig: TAKE 1 TABLET BY MOUTH TWICE DAILY       Patient notified refills can take 72 hours to process:[SC1.1T] N/A[SC1.1M]    Patient notified Provider is:[SC1.1T] N/A[SC1.1M]    Next Appointment Scheduled:[SC1.1T] 8/7/18 with[SC1.1M] Alon RODNEY       Next and Last Visit with Provider and Department  Next visit with ALON GILL is on 08/07/2018 at 10:00 AM in PSYCHIATRY SEQ  Next visit with PSYCHIATRY is on 08/07/2018 at 10:00 AM in PSYCHIATRY SEQ   Last visit with ALON GILL was on 02/05/2018 at  1:40 PM in PSYCHIATRY SEQ  Last visit with PSYCHIATRY was on 02/05/2018 at  1:40 PM in PSYCHIATRY SEQ      WEIGHT AND HEIGHT: As of 04/10/2018 weight is 192 lbs.(87.091 kg). Height is 5' 6\"(1.676 m).   BMI is 31.00 kg/(m^2) calculated from:     Height 5' 6\" (1.676 m) as of 4/10/18     Weight 192 lb (87.091 kg) as of 4/10/18      Allergies      Allergen   Reactions   • Clonazepam  Other - See Comments     Shaking, tremors, low electrolytes    • Codeine  Other - See Comments     Feels \"spaced out\", disoriented, and nauseous    • Lisinopril       cough       Current outpatient prescriptions       Medication  Sig Dispense Refill   • tramadol (ULTRAM) 50 MG tablet Take 1-2 Tabs by mouth 2 (two) times daily as needed for Pain. 120 Tab 2   • gabapentin (NEURONTIN) 400 MG Cap TAKE 1 CAPSULE BY MOUTH THREE TIMES DAILY 90 Cap 4   • diazepam (VALIUM) 2 MG tablet TAKE 1 TABLET BY MOUTH TWICE DAILY AS NEEDED FOR ANXIETY. 60 Tab 2   • lamotrigine (LAMICTAL) 100 MG tablet TAKE 1 TABLET BY MOUTH TWICE DAILY 180 Tab 0   • amlodipine (NORVASC) 2.5 MG tablet TAKE 1 TABLET BY MOUTH DAILY 30 Tab 11   • pantoprazole (PROTONIX) 40 MG tablet TAKE 1 TABLET BY MOUTH TWICE DAILY 60 Tab 11   • mirtazapine (REMERON) 30 MG tablet Take 1 Tab by mouth nightly. 90 Tab 1   • lamotrigine (LAMICTAL) 100 MG tablet Take 1 Tab by mouth 2 (two) times daily. 180 Tab 1   • sertraline (ZOLOFT) 100 MG tablet Take 2 Tabs by mouth daily. 180 Tab 1   • atorvastatin (LIPITOR) 20 MG tablet Take 1 Tab by mouth daily. 90 Tab 3   • losartan (COZAAR) 100 MG tablet Take 1 Tab by mouth daily. 90 Tab 3   • metoprolol (TOPROL-XL) 100 MG 24 hr tablet Take 1 Tab by mouth daily. 90 Tab 3   • levothyroxine 25 MCG tablet TAKE 1 TABLET BY MOUTH EVERY DAY 90 Tab 3   • Multiple Vitamin CHEW Take  by mouth.          ROUTING:[SC1.1T] ROUTE TO COVERING PHYSICIAN for response.[SC1.1M]        PCP: Stoney Hawkins MD         INS: Payor: MEDICARE - ASSIGNED / Plan: *No Plan* / Product Type: *No Product type* / Note: This is the primary coverage, but no account was found for this location or the patient's primary location.   ADDRESS:  [address] [place] [id number][SC1.1T]           Revision History        User Key Date/Time User Provider Type Action    > SC1.2 05/25/18 08:51 AM Jamila Lee, RN, BSN Registered Nurse Sign     SC1.1 05/25/18 08:49 AM Jamila Lee RN, BSN Registered Nurse     M - Manual, T - Template             No

## 2020-03-02 NOTE — CONSULT NOTE ADULT - SUBJECTIVE AND OBJECTIVE BOX
[facility] DIVISION OF KIDNEY DISEASES AND HYPERTENSION -- INITIAL CONSULT NOTE  --------------------------------------------------------------------------------  HPI:  85 year old male pt with  hx of  CKD 5 not on HD, anemia of chronic disease on Aranesp, recent GI bleed s/p colonoscopy and polyp resection, EGD with gastritis, arrhythmia on flecainide brought in  by daughter for worsening fatigue. Pt was recently admitted to [facility] for worsening anemia (hb 6.6) requiring 2 U PRBC transfusion. Hb at discharge was 9.1, Hb in ED today is 8.7. Pt's daughter is requesting if pt can get 1 U PRBC transfusion and Aranesp injection. pt seen and examined; feels tired. Does not have any acute complaint.  Pt is known to Nephrology service; he follows with Dr. Avendano and myself. I last saw pt in renal clinic on wednesday after hospital discharge; Hb was 8.3 that day. He has a LUE AVF which was created last year; family has been hesitant to start dialysis.         PAST HISTORY  --------------------------------------------------------------------------------  PAST MEDICAL & SURGICAL HISTORY:  Risk factors for obstructive sleep apnea  Anemia  RICHARDS (dyspnea on exertion)  VT (ventricular tachycardia)  HTN (hypertension)  CAD (coronary artery disease)  CKD (chronic kidney disease): stage IV  Arrhythmia  AV block, 1st degree  DM (diabetes mellitus)  H/O carotid endarterectomy: Right  A-V fistula: left arm 5/2017  H/O angioplasty: 2013,  no  intervention  H/O left knee surgery  H/O circumcision: at  age  65    FAMILY HISTORY:  Family history of premature CAD  Family history of lung cancer  Family history of cancer    PAST SOCIAL HISTORY:  ; lives at home with wife  ALLERGIES & MEDICATIONS  --------------------------------------------------------------------------------  Allergies    Plavix (Hives)  Toprol-XL (Rash)    REVIEW OF SYSTEMS  --------------------------------------------------------------------------------  Gen: no fever, chills, fatigue+  Skin: No rashes  Head/Eyes/Ears/Mouth: No headache; Normal hearing; Normal vision w/o blurriness  Respiratory: dyspnea+ , No cough, wheezing, hemoptysis  CV: No chest pain, PND, orthopnea  GI: No abdominal pain, diarrhea, constipation, nausea, vomiting, melena, hematochezia  : no changes in urination  MSK: No joint pain/swelling; no back pain; leg edema  Neuro: No dizziness/lightheadedness  Heme: No easy bruising or bleeding  Endo: No heat/cold intolerance  Psych: No significant nervousness, anxiety, stress, depression      VITALS/PHYSICAL EXAM  --------------------------------------------------------------------------------  T(C): 36.7 (03-02-20 @ 13:08), Max: 36.7 (03-02-20 @ 12:41)  HR: 77 (03-02-20 @ 13:08) (77 - 83)  BP: 150/77 (03-02-20 @ 13:08) (149/64 - 164/69)  RR: 20 (03-02-20 @ 13:08) (16 - 20)  SpO2: 99% (03-02-20 @ 13:08) (98% - 100%)  Wt(kg): --  Height (cm): 160.02 (03-02-20 @ 09:49)  Weight (kg): 66.7 (03-02-20 @ 09:49)  BMI (kg/m2): 26 (03-02-20 @ 09:49)  BSA (m2): 1.7 (03-02-20 @ 09:49)      Physical Exam:  	Gen: elderly man, pale  	HEENT: on room air, supple neck  	Pulm: CTA b/l  	CV: RRR, S1S2; no rub  	Back: No spinal or CVA tenderness  	Abd: +BS, soft, nontender/nondistended  	: No suprapubic tenderness  	UE: Warm,  no edema; no asterixis  	LE: Warm, no edema  	Neuro: No focal deficits  	Psych: Normal affect and mood  	Skin: Warm, without rashes  	Vascular access: LUE AVF, thrill+ bruit+    LABS/STUDIES  --------------------------------------------------------------------------------              8.7    7.71  >-----------<  239      [03-02-20 @ 10:18]              27.6     143  |  100  |  79.0  ----------------------------<  207      [03-02-20 @ 10:18]  4.4   |  24.0  |  5.53        Ca     9.6     [03-02-20 @ 10:18]    TPro  6.5  /  Alb  3.7  /  TBili  0.4  /  DBili  x   /  AST  27  /  ALT  65  /  AlkPhos  90  [03-02-20 @ 10:18]    PT/INR: PT 12.3 , INR 1.09       [03-02-20 @ 10:18]      Creatinine Trend:  SCr 5.53 [03-02 @ 10:18]  SCr 4.92 [02-21 @ 06:09]  SCr 4.83 [02-20 @ 12:30]    Urinalysis - [02-01-18 @ 20:53]      Color Yellow / Appearance Clear / SG 1.015 / pH 6.0      Gluc Negative / Ketone Negative  / Bili Negative / Urobili Negative       Blood Trace / Protein 100 / Leuk Est Negative / Nitrite Negative      RBC 0-2 / WBC 0-2 / Hyaline  / Gran  / Sq Epi  / Non Sq Epi Rare / Bacteria       Iron 79, TIBC 289, %sat 27      [02-20-20 @ 21:56]  Ferritin 248      [02-20-20 @ 21:56]  HbA1c 4.9      [08-09-18 @ 05:54]    HBsAb <3.0      [08-09-18 @ 18:29]  HBsAg Nonreact      [08-09-18 @ 18:29]  HBcAb Nonreact      [08-09-18 @ 18:29]  HCV 0.09, Nonreact      [08-09-18 @ 18:29]

## 2020-03-02 NOTE — ED ADULT TRIAGE NOTE - CHIEF COMPLAINT QUOTE
Per daughter, pt is very weak and there is a drop in his hemoglobin and ot more weak and tired, 8.3 HBG Thursday.. Pt takes iron 3 times a day,

## 2020-03-05 ENCOUNTER — APPOINTMENT (OUTPATIENT)
Dept: HEMATOLOGY ONCOLOGY | Facility: CLINIC | Age: 85
End: 2020-03-05

## 2020-03-05 ENCOUNTER — APPOINTMENT (OUTPATIENT)
Age: 85
End: 2020-03-05

## 2020-03-16 ENCOUNTER — APPOINTMENT (OUTPATIENT)
Age: 85
End: 2020-03-16

## 2020-03-16 ENCOUNTER — RESULT REVIEW (OUTPATIENT)
Age: 85
End: 2020-03-16

## 2020-03-16 ENCOUNTER — APPOINTMENT (OUTPATIENT)
Dept: HEMATOLOGY ONCOLOGY | Facility: CLINIC | Age: 85
End: 2020-03-16
Payer: MEDICARE

## 2020-03-16 VITALS
WEIGHT: 153 LBS | SYSTOLIC BLOOD PRESSURE: 165 MMHG | HEIGHT: 65 IN | DIASTOLIC BLOOD PRESSURE: 74 MMHG | TEMPERATURE: 98.1 F | HEART RATE: 69 BPM | OXYGEN SATURATION: 98 % | BODY MASS INDEX: 25.49 KG/M2

## 2020-03-16 LAB
BASOPHILS # BLD AUTO: 0.1 K/UL — SIGNIFICANT CHANGE UP (ref 0–0.2)
BASOPHILS NFR BLD AUTO: 1.8 % — SIGNIFICANT CHANGE UP (ref 0–2)
EOSINOPHIL # BLD AUTO: 0.3 K/UL — SIGNIFICANT CHANGE UP (ref 0–0.5)
EOSINOPHIL NFR BLD AUTO: 5 % — SIGNIFICANT CHANGE UP (ref 0–6)
HCT VFR BLD CALC: 29.1 % — LOW (ref 39–50)
HGB BLD-MCNC: 9 G/DL — LOW (ref 13–17)
LYMPHOCYTES # BLD AUTO: 0.9 K/UL — LOW (ref 1–3.3)
LYMPHOCYTES # BLD AUTO: 15.1 % — SIGNIFICANT CHANGE UP (ref 13–44)
MCHC RBC-ENTMCNC: 29.3 PG — SIGNIFICANT CHANGE UP (ref 27–34)
MCHC RBC-ENTMCNC: 31 G/DL — LOW (ref 32–36)
MCV RBC AUTO: 94.5 FL — SIGNIFICANT CHANGE UP (ref 80–100)
MONOCYTES # BLD AUTO: 0.5 K/UL — SIGNIFICANT CHANGE UP (ref 0–0.9)
MONOCYTES NFR BLD AUTO: 8.3 % — SIGNIFICANT CHANGE UP (ref 2–14)
NEUTROPHILS # BLD AUTO: 4.2 K/UL — SIGNIFICANT CHANGE UP (ref 1.8–7.4)
NEUTROPHILS NFR BLD AUTO: 69.7 % — SIGNIFICANT CHANGE UP (ref 43–77)
PLATELET # BLD AUTO: 172 K/UL — SIGNIFICANT CHANGE UP (ref 150–400)
RBC # BLD: 3.08 M/UL — LOW (ref 4.2–5.8)
RBC # FLD: 16.1 % — HIGH (ref 10.3–14.5)
WBC # BLD: 6 K/UL — SIGNIFICANT CHANGE UP (ref 3.8–10.5)
WBC # FLD AUTO: 6 K/UL — SIGNIFICANT CHANGE UP (ref 3.8–10.5)

## 2020-03-16 PROCEDURE — 99213 OFFICE O/P EST LOW 20 MIN: CPT

## 2020-03-16 NOTE — ASSESSMENT
[FreeTextEntry1] : Mr. King is a 84 yo WM with a long history of renal, disease, currently Stage 5, who has been treated conservatively, no dialysis, but who has renal related anemia, has been on procrit, but HGb has been ranging between 8.6 - 11. Was switched to Aranesp. HGb today is 9 gms, will give Aranesp today, RTO in 2 weeks for aranesp and 4 weeks for OV and aranesp.

## 2020-03-16 NOTE — HISTORY OF PRESENT ILLNESS
[de-identified] : Mr. King is a 86 yo WM with a long history of renal, disease, currently Stage 5, who has been treated conservatively, no dialysis, but who has renal related anemia, has been on procrit, but HGb has been ranging between 8.6 - 11. Was switched to Aranesp.  [de-identified] : Was in the hospital on March 2nd, due to fatigue, was transfused and received a dose of aranesp. He denies obvious  signs of bleeding, Just came from cardiology , where he had an echo done and was told everything was good.

## 2020-03-23 ENCOUNTER — OUTPATIENT (OUTPATIENT)
Dept: OUTPATIENT SERVICES | Facility: HOSPITAL | Age: 85
LOS: 1 days | Discharge: HOME | End: 2020-03-23

## 2020-03-23 DIAGNOSIS — Z98.890 OTHER SPECIFIED POSTPROCEDURAL STATES: Chronic | ICD-10-CM

## 2020-03-23 DIAGNOSIS — Z98.62 PERIPHERAL VASCULAR ANGIOPLASTY STATUS: Chronic | ICD-10-CM

## 2020-03-23 DIAGNOSIS — I77.0 ARTERIOVENOUS FISTULA, ACQUIRED: Chronic | ICD-10-CM

## 2020-03-23 DIAGNOSIS — D63.1 ANEMIA IN CHRONIC KIDNEY DISEASE: ICD-10-CM

## 2020-03-30 ENCOUNTER — RESULT REVIEW (OUTPATIENT)
Age: 85
End: 2020-03-30

## 2020-03-30 ENCOUNTER — APPOINTMENT (OUTPATIENT)
Age: 85
End: 2020-03-30

## 2020-03-30 LAB
BASOPHILS # BLD AUTO: 0.1 K/UL — SIGNIFICANT CHANGE UP (ref 0–0.2)
BASOPHILS NFR BLD AUTO: 1.6 % — SIGNIFICANT CHANGE UP (ref 0–2)
EOSINOPHIL # BLD AUTO: 0.3 K/UL — SIGNIFICANT CHANGE UP (ref 0–0.5)
EOSINOPHIL NFR BLD AUTO: 3.7 % — SIGNIFICANT CHANGE UP (ref 0–6)
HCT VFR BLD CALC: 26.8 % — LOW (ref 39–50)
HGB BLD-MCNC: 8.2 G/DL — LOW (ref 13–17)
LYMPHOCYTES # BLD AUTO: 0.9 K/UL — LOW (ref 1–3.3)
LYMPHOCYTES # BLD AUTO: 11.6 % — LOW (ref 13–44)
MCHC RBC-ENTMCNC: 29.6 PG — SIGNIFICANT CHANGE UP (ref 27–34)
MCHC RBC-ENTMCNC: 30.8 G/DL — LOW (ref 32–36)
MCV RBC AUTO: 96.2 FL — SIGNIFICANT CHANGE UP (ref 80–100)
MONOCYTES # BLD AUTO: 0.6 K/UL — SIGNIFICANT CHANGE UP (ref 0–0.9)
MONOCYTES NFR BLD AUTO: 7.9 % — SIGNIFICANT CHANGE UP (ref 2–14)
NEUTROPHILS # BLD AUTO: 5.9 K/UL — SIGNIFICANT CHANGE UP (ref 1.8–7.4)
NEUTROPHILS NFR BLD AUTO: 75.3 % — SIGNIFICANT CHANGE UP (ref 43–77)
PLATELET # BLD AUTO: 208 K/UL — SIGNIFICANT CHANGE UP (ref 150–400)
RBC # BLD: 2.78 M/UL — LOW (ref 4.2–5.8)
RBC # FLD: 17 % — HIGH (ref 10.3–14.5)
WBC # BLD: 7.8 K/UL — SIGNIFICANT CHANGE UP (ref 3.8–10.5)
WBC # FLD AUTO: 7.8 K/UL — SIGNIFICANT CHANGE UP (ref 3.8–10.5)

## 2020-03-31 DIAGNOSIS — N18.5 CHRONIC KIDNEY DISEASE, STAGE 5: ICD-10-CM

## 2020-03-31 DIAGNOSIS — N18.4 CHRONIC KIDNEY DISEASE, STAGE 4 (SEVERE): ICD-10-CM

## 2020-04-06 ENCOUNTER — APPOINTMENT (OUTPATIENT)
Dept: HEMATOLOGY ONCOLOGY | Facility: CLINIC | Age: 85
End: 2020-04-06

## 2020-04-13 ENCOUNTER — RESULT REVIEW (OUTPATIENT)
Age: 85
End: 2020-04-13

## 2020-04-13 ENCOUNTER — APPOINTMENT (OUTPATIENT)
Age: 85
End: 2020-04-13

## 2020-04-13 ENCOUNTER — APPOINTMENT (OUTPATIENT)
Dept: HEMATOLOGY ONCOLOGY | Facility: CLINIC | Age: 85
End: 2020-04-13

## 2020-04-13 LAB
BASOPHILS # BLD AUTO: 0.1 K/UL — SIGNIFICANT CHANGE UP (ref 0–0.2)
BASOPHILS NFR BLD AUTO: 2 % — SIGNIFICANT CHANGE UP (ref 0–2)
EOSINOPHIL # BLD AUTO: 0.2 K/UL — SIGNIFICANT CHANGE UP (ref 0–0.5)
EOSINOPHIL NFR BLD AUTO: 2.8 % — SIGNIFICANT CHANGE UP (ref 0–6)
HCT VFR BLD CALC: 28.8 % — LOW (ref 39–50)
HGB BLD-MCNC: 9.1 G/DL — LOW (ref 13–17)
LYMPHOCYTES # BLD AUTO: 0.9 K/UL — LOW (ref 1–3.3)
LYMPHOCYTES # BLD AUTO: 13.3 % — SIGNIFICANT CHANGE UP (ref 13–44)
MCHC RBC-ENTMCNC: 29.9 PG — SIGNIFICANT CHANGE UP (ref 27–34)
MCHC RBC-ENTMCNC: 31.8 G/DL — LOW (ref 32–36)
MCV RBC AUTO: 94 FL — SIGNIFICANT CHANGE UP (ref 80–100)
MONOCYTES # BLD AUTO: 0.7 K/UL — SIGNIFICANT CHANGE UP (ref 0–0.9)
MONOCYTES NFR BLD AUTO: 10 % — SIGNIFICANT CHANGE UP (ref 2–14)
NEUTROPHILS # BLD AUTO: 4.8 K/UL — SIGNIFICANT CHANGE UP (ref 1.8–7.4)
NEUTROPHILS NFR BLD AUTO: 71.9 % — SIGNIFICANT CHANGE UP (ref 43–77)
PLATELET # BLD AUTO: 176 K/UL — SIGNIFICANT CHANGE UP (ref 150–400)
RBC # BLD: 3.06 M/UL — LOW (ref 4.2–5.8)
RBC # FLD: 16.3 % — HIGH (ref 10.3–14.5)
WBC # BLD: 6.7 K/UL — SIGNIFICANT CHANGE UP (ref 3.8–10.5)
WBC # FLD AUTO: 6.7 K/UL — SIGNIFICANT CHANGE UP (ref 3.8–10.5)

## 2020-04-20 ENCOUNTER — OUTPATIENT (OUTPATIENT)
Dept: OUTPATIENT SERVICES | Facility: HOSPITAL | Age: 85
LOS: 1 days | Discharge: HOME | End: 2020-04-20

## 2020-04-20 DIAGNOSIS — Z98.890 OTHER SPECIFIED POSTPROCEDURAL STATES: Chronic | ICD-10-CM

## 2020-04-20 DIAGNOSIS — Z98.62 PERIPHERAL VASCULAR ANGIOPLASTY STATUS: Chronic | ICD-10-CM

## 2020-04-20 DIAGNOSIS — D63.1 ANEMIA IN CHRONIC KIDNEY DISEASE: ICD-10-CM

## 2020-04-20 DIAGNOSIS — I77.0 ARTERIOVENOUS FISTULA, ACQUIRED: Chronic | ICD-10-CM

## 2020-04-21 ENCOUNTER — APPOINTMENT (OUTPATIENT)
Dept: NEPHROLOGY | Facility: CLINIC | Age: 85
End: 2020-04-21

## 2020-04-24 DIAGNOSIS — Z87.19 PERSONAL HISTORY OF OTHER DISEASES OF THE DIGESTIVE SYSTEM: ICD-10-CM

## 2020-04-24 DIAGNOSIS — Z87.898 PERSONAL HISTORY OF OTHER SPECIFIED CONDITIONS: ICD-10-CM

## 2020-04-24 DIAGNOSIS — M19.011 PRIMARY OSTEOARTHRITIS, RIGHT SHOULDER: ICD-10-CM

## 2020-04-27 ENCOUNTER — RESULT REVIEW (OUTPATIENT)
Age: 85
End: 2020-04-27

## 2020-04-27 ENCOUNTER — APPOINTMENT (OUTPATIENT)
Age: 85
End: 2020-04-27

## 2020-04-27 ENCOUNTER — APPOINTMENT (OUTPATIENT)
Dept: NEPHROLOGY | Facility: CLINIC | Age: 85
End: 2020-04-27

## 2020-04-27 ENCOUNTER — APPOINTMENT (OUTPATIENT)
Dept: HEMATOLOGY ONCOLOGY | Facility: CLINIC | Age: 85
End: 2020-04-27
Payer: MEDICARE

## 2020-04-27 VITALS
TEMPERATURE: 97.9 F | HEIGHT: 65 IN | HEART RATE: 88 BPM | WEIGHT: 162 LBS | OXYGEN SATURATION: 97 % | SYSTOLIC BLOOD PRESSURE: 155 MMHG | BODY MASS INDEX: 26.99 KG/M2 | DIASTOLIC BLOOD PRESSURE: 66 MMHG

## 2020-04-27 LAB
BASOPHILS # BLD AUTO: 0.1 K/UL — SIGNIFICANT CHANGE UP (ref 0–0.2)
BASOPHILS NFR BLD AUTO: 2.6 % — HIGH (ref 0–2)
EOSINOPHIL # BLD AUTO: 0.3 K/UL — SIGNIFICANT CHANGE UP (ref 0–0.5)
EOSINOPHIL NFR BLD AUTO: 6.8 % — HIGH (ref 0–6)
HCT VFR BLD CALC: 26.7 % — LOW (ref 39–50)
HGB BLD-MCNC: 8.7 G/DL — LOW (ref 13–17)
LYMPHOCYTES # BLD AUTO: 0.6 K/UL — LOW (ref 1–3.3)
LYMPHOCYTES # BLD AUTO: 12.8 % — LOW (ref 13–44)
MCHC RBC-ENTMCNC: 30.4 PG — SIGNIFICANT CHANGE UP (ref 27–34)
MCHC RBC-ENTMCNC: 32.6 G/DL — SIGNIFICANT CHANGE UP (ref 32–36)
MCV RBC AUTO: 93.3 FL — SIGNIFICANT CHANGE UP (ref 80–100)
MONOCYTES # BLD AUTO: 0.4 K/UL — SIGNIFICANT CHANGE UP (ref 0–0.9)
MONOCYTES NFR BLD AUTO: 8.9 % — SIGNIFICANT CHANGE UP (ref 2–14)
NEUTROPHILS # BLD AUTO: 3.4 K/UL — SIGNIFICANT CHANGE UP (ref 1.8–7.4)
NEUTROPHILS NFR BLD AUTO: 68.9 % — SIGNIFICANT CHANGE UP (ref 43–77)
PLATELET # BLD AUTO: 179 K/UL — SIGNIFICANT CHANGE UP (ref 150–400)
RBC # BLD: 2.86 M/UL — LOW (ref 4.2–5.8)
RBC # FLD: 15.6 % — HIGH (ref 10.3–14.5)
WBC # BLD: 4.9 K/UL — SIGNIFICANT CHANGE UP (ref 3.8–10.5)
WBC # FLD AUTO: 4.9 K/UL — SIGNIFICANT CHANGE UP (ref 3.8–10.5)

## 2020-04-27 PROCEDURE — 99213 OFFICE O/P EST LOW 20 MIN: CPT

## 2020-04-27 NOTE — ASSESSMENT
[FreeTextEntry1] : Mr. King is a 84 yo WM with a long history of renal, disease, currently Stage 5, who has been treated conservatively, no dialysis, but who has renal related anemia, has been on procrit, but HGb has been ranging between 8.6 - 11. Was switched to Aranesp

## 2020-04-27 NOTE — HISTORY OF PRESENT ILLNESS
[de-identified] : Mr. King is a 86 yo WM with a long history of renal, disease, currently Stage 5, who has been treated conservatively, no dialysis, but who has renal related anemia, has been on Procrit, but HGb has been ranging between 8.6 - 11. Was switched to Aranesp.  [de-identified] : Patient admits to SOb when laying flat 2 days ago. States it has resolved since then, patient has appointment with Cardiology later today. Patient also admits nosebleed that last 2-3 minutes 30 minutes after Aranesp injection. Nose bleed stopped on its own. Patient admits this is an isolated incident. Denies fever/chills, fatigue,nausea, vomiting, diarrhea,constipation,  abdominal pain, easy bruising or visual changes, chest pain,  SOB or RICHARDS,  LE edema.\par

## 2020-04-27 NOTE — PHYSICAL EXAM
[Restricted in physically strenuous activity but ambulatory and able to carry out work of a light or sedentary nature] : Status 1- Restricted in physically strenuous activity but ambulatory and able to carry out work of a light or sedentary nature, e.g., light house work, office work [Normal] : affect appropriate [de-identified] : + murmur

## 2020-04-28 DIAGNOSIS — N18.3 CHRONIC KIDNEY DISEASE, STAGE 3 (MODERATE): ICD-10-CM

## 2020-04-28 DIAGNOSIS — N18.5 CHRONIC KIDNEY DISEASE, STAGE 5: ICD-10-CM

## 2020-04-28 LAB
ALBUMIN SERPL ELPH-MCNC: 4.1 G/DL
ALP BLD-CCNC: 97 U/L
ALT SERPL-CCNC: 33 U/L
ANION GAP SERPL CALC-SCNC: 18 MMOL/L
AST SERPL-CCNC: 22 U/L
BILIRUB SERPL-MCNC: 0.4 MG/DL
BUN SERPL-MCNC: 78 MG/DL
CALCIUM SERPL-MCNC: 9.5 MG/DL
CHLORIDE SERPL-SCNC: 106 MMOL/L
CO2 SERPL-SCNC: 21 MMOL/L
CREAT SERPL-MCNC: 4.87 MG/DL
GLUCOSE SERPL-MCNC: 147 MG/DL
POTASSIUM SERPL-SCNC: 5.1 MMOL/L
PROT SERPL-MCNC: 6.3 G/DL
SODIUM SERPL-SCNC: 144 MMOL/L

## 2020-05-08 ENCOUNTER — EMERGENCY (EMERGENCY)
Facility: HOSPITAL | Age: 85
LOS: 1 days | Discharge: HOME | End: 2020-05-08
Attending: EMERGENCY MEDICINE
Payer: MEDICARE

## 2020-05-08 VITALS
OXYGEN SATURATION: 99 % | RESPIRATION RATE: 20 BRPM | DIASTOLIC BLOOD PRESSURE: 90 MMHG | SYSTOLIC BLOOD PRESSURE: 180 MMHG | HEART RATE: 79 BPM

## 2020-05-08 VITALS
RESPIRATION RATE: 18 BRPM | SYSTOLIC BLOOD PRESSURE: 177 MMHG | DIASTOLIC BLOOD PRESSURE: 79 MMHG | HEART RATE: 89 BPM | TEMPERATURE: 98 F | WEIGHT: 158.07 LBS | HEIGHT: 64 IN | OXYGEN SATURATION: 98 %

## 2020-05-08 DIAGNOSIS — I77.0 ARTERIOVENOUS FISTULA, ACQUIRED: Chronic | ICD-10-CM

## 2020-05-08 DIAGNOSIS — Z98.890 OTHER SPECIFIED POSTPROCEDURAL STATES: Chronic | ICD-10-CM

## 2020-05-08 DIAGNOSIS — Z98.62 PERIPHERAL VASCULAR ANGIOPLASTY STATUS: Chronic | ICD-10-CM

## 2020-05-08 LAB
ALBUMIN SERPL ELPH-MCNC: 4 G/DL — SIGNIFICANT CHANGE UP (ref 3.3–5.2)
ALP SERPL-CCNC: 94 U/L — SIGNIFICANT CHANGE UP (ref 40–120)
ALT FLD-CCNC: 24 U/L — SIGNIFICANT CHANGE UP
ANION GAP SERPL CALC-SCNC: 14 MMOL/L — SIGNIFICANT CHANGE UP (ref 5–17)
APPEARANCE UR: CLEAR — SIGNIFICANT CHANGE UP
AST SERPL-CCNC: 19 U/L — SIGNIFICANT CHANGE UP
BACTERIA # UR AUTO: ABNORMAL
BILIRUB SERPL-MCNC: 0.4 MG/DL — SIGNIFICANT CHANGE UP (ref 0.4–2)
BILIRUB UR-MCNC: NEGATIVE — SIGNIFICANT CHANGE UP
BLD GP AB SCN SERPL QL: SIGNIFICANT CHANGE UP
BUN SERPL-MCNC: 88 MG/DL — HIGH (ref 8–20)
CALCIUM SERPL-MCNC: 9.6 MG/DL — SIGNIFICANT CHANGE UP (ref 8.6–10.2)
CHLORIDE SERPL-SCNC: 105 MMOL/L — SIGNIFICANT CHANGE UP (ref 98–107)
CO2 SERPL-SCNC: 20 MMOL/L — LOW (ref 22–29)
COLOR SPEC: YELLOW — SIGNIFICANT CHANGE UP
CREAT SERPL-MCNC: 4.73 MG/DL — HIGH (ref 0.5–1.3)
DIFF PNL FLD: ABNORMAL
EPI CELLS # UR: SIGNIFICANT CHANGE UP
GLUCOSE SERPL-MCNC: 174 MG/DL — HIGH (ref 70–99)
GLUCOSE UR QL: 100 MG/DL
HCT VFR BLD CALC: 27.8 % — LOW (ref 39–50)
HGB BLD-MCNC: 8.5 G/DL — LOW (ref 13–17)
KETONES UR-MCNC: NEGATIVE — SIGNIFICANT CHANGE UP
LEUKOCYTE ESTERASE UR-ACNC: NEGATIVE — SIGNIFICANT CHANGE UP
MCHC RBC-ENTMCNC: 29.6 PG — SIGNIFICANT CHANGE UP (ref 27–34)
MCHC RBC-ENTMCNC: 30.6 GM/DL — LOW (ref 32–36)
MCV RBC AUTO: 96.9 FL — SIGNIFICANT CHANGE UP (ref 80–100)
NITRITE UR-MCNC: NEGATIVE — SIGNIFICANT CHANGE UP
NT-PROBNP SERPL-SCNC: HIGH PG/ML (ref 0–300)
PH UR: 6 — SIGNIFICANT CHANGE UP (ref 5–8)
PLATELET # BLD AUTO: 214 K/UL — SIGNIFICANT CHANGE UP (ref 150–400)
POTASSIUM SERPL-MCNC: 5.2 MMOL/L — SIGNIFICANT CHANGE UP (ref 3.5–5.3)
POTASSIUM SERPL-SCNC: 5.2 MMOL/L — SIGNIFICANT CHANGE UP (ref 3.5–5.3)
PROT SERPL-MCNC: 6.8 G/DL — SIGNIFICANT CHANGE UP (ref 6.6–8.7)
PROT UR-MCNC: 100 MG/DL
RBC # BLD: 2.87 M/UL — LOW (ref 4.2–5.8)
RBC # FLD: 15.4 % — HIGH (ref 10.3–14.5)
RBC CASTS # UR COMP ASSIST: SIGNIFICANT CHANGE UP /HPF (ref 0–4)
SODIUM SERPL-SCNC: 139 MMOL/L — SIGNIFICANT CHANGE UP (ref 135–145)
SP GR SPEC: 1.01 — SIGNIFICANT CHANGE UP (ref 1.01–1.02)
TROPONIN T SERPL-MCNC: 0.07 NG/ML — HIGH (ref 0–0.06)
UROBILINOGEN FLD QL: NEGATIVE MG/DL — SIGNIFICANT CHANGE UP
WBC # BLD: 3.86 K/UL — SIGNIFICANT CHANGE UP (ref 3.8–10.5)
WBC # FLD AUTO: 3.86 K/UL — SIGNIFICANT CHANGE UP (ref 3.8–10.5)
WBC UR QL: SIGNIFICANT CHANGE UP

## 2020-05-08 PROCEDURE — 81001 URINALYSIS AUTO W/SCOPE: CPT

## 2020-05-08 PROCEDURE — 71045 X-RAY EXAM CHEST 1 VIEW: CPT

## 2020-05-08 PROCEDURE — 80053 COMPREHEN METABOLIC PANEL: CPT

## 2020-05-08 PROCEDURE — 93005 ELECTROCARDIOGRAM TRACING: CPT

## 2020-05-08 PROCEDURE — 86850 RBC ANTIBODY SCREEN: CPT

## 2020-05-08 PROCEDURE — 36430 TRANSFUSION BLD/BLD COMPNT: CPT

## 2020-05-08 PROCEDURE — 85027 COMPLETE CBC AUTOMATED: CPT

## 2020-05-08 PROCEDURE — 96374 THER/PROPH/DIAG INJ IV PUSH: CPT

## 2020-05-08 PROCEDURE — 84484 ASSAY OF TROPONIN QUANT: CPT

## 2020-05-08 PROCEDURE — 86901 BLOOD TYPING SEROLOGIC RH(D): CPT

## 2020-05-08 PROCEDURE — 36415 COLL VENOUS BLD VENIPUNCTURE: CPT

## 2020-05-08 PROCEDURE — P9016: CPT

## 2020-05-08 PROCEDURE — 71045 X-RAY EXAM CHEST 1 VIEW: CPT | Mod: 26

## 2020-05-08 PROCEDURE — 83880 ASSAY OF NATRIURETIC PEPTIDE: CPT

## 2020-05-08 PROCEDURE — 99285 EMERGENCY DEPT VISIT HI MDM: CPT | Mod: 25

## 2020-05-08 PROCEDURE — 99285 EMERGENCY DEPT VISIT HI MDM: CPT

## 2020-05-08 PROCEDURE — 96376 TX/PRO/DX INJ SAME DRUG ADON: CPT

## 2020-05-08 PROCEDURE — 93010 ELECTROCARDIOGRAM REPORT: CPT

## 2020-05-08 PROCEDURE — 86923 COMPATIBILITY TEST ELECTRIC: CPT

## 2020-05-08 PROCEDURE — 86900 BLOOD TYPING SEROLOGIC ABO: CPT

## 2020-05-08 RX ORDER — FUROSEMIDE 40 MG
40 TABLET ORAL ONCE
Refills: 0 | Status: COMPLETED | OUTPATIENT
Start: 2020-05-08 | End: 2020-05-08

## 2020-05-08 RX ORDER — HYDRALAZINE HCL 50 MG
50 TABLET ORAL ONCE
Refills: 0 | Status: COMPLETED | OUTPATIENT
Start: 2020-05-08 | End: 2020-05-08

## 2020-05-08 RX ADMIN — Medication 50 MILLIGRAM(S): at 19:29

## 2020-05-08 RX ADMIN — Medication 40 MILLIGRAM(S): at 20:26

## 2020-05-08 RX ADMIN — Medication 40 MILLIGRAM(S): at 16:22

## 2020-05-08 NOTE — ED PROVIDER NOTE - OBJECTIVE STATEMENT
The patient is a 85 year old male presents with generalized weakness, palpitations and SOB for few days  No fever, No cough  No abd pain, No motor No sensory loss  The patient states that the SOB is worse at night time and positional.

## 2020-05-08 NOTE — ED PROVIDER NOTE - PATIENT PORTAL LINK FT
You can access the FollowMyHealth Patient Portal offered by [facility] by registering at the following website: [email or web address]. By joining [facility]’s FollowMyHealth portal, you will also be able to view your health information using other applications (apps) compatible with our system.

## 2020-05-08 NOTE — ED ADULT NURSE NOTE - OBJECTIVE STATEMENT
pt c/o weakness, bilat lower ext edema. pt c/o weakness, palpitation, with 5lbs weight gain, noted  bilat lower ext edema., left av fistula + bruit/thrill

## 2020-05-08 NOTE — CONSULT NOTE ADULT - ASSESSMENT
Assessment  HFpEF with mild volume overload on exam  Mod As mild to mod MR mod MS normal EF  Nonobst CAD  CRF with AV fistula in place ( no HD as yet)  H/o PAF/NSVT presently in SR  anemia of CD receiving aranesp   HTN    Rec  Routine labs incl BNP  CXR  IV lasix 40 mg X1  if H/H < 9 would transfuse 1 u prbc with IV lasix post transfusion  if pt duireses well may dc home later on prior oupt diuretic dose and will FU in office for CHF management next week with Dr Peterson

## 2020-05-08 NOTE — ED PROVIDER NOTE - CLINICAL SUMMARY MEDICAL DECISION MAKING FREE TEXT BOX
The patient presents with mild anemia and CHF and will give lasix and one unit of PRBC and seen by Cardiology and dc home

## 2020-05-08 NOTE — ED ADULT TRIAGE NOTE - CHIEF COMPLAINT QUOTE
pt came to ED from home c/o weakness & lethargy with heart palpitations that get worse when laying down. Pt currently denies any chest pain or SOB. NAD noted. Pt noted with B/L LE edema

## 2020-05-08 NOTE — ED ADULT NURSE REASSESSMENT NOTE - NS ED NURSE REASSESS COMMENT FT1
blood transfusion verified with RN  Reyna Stearns, s/s/ iof reaction explianed to pt, pt verbalized understanding, transfusion started. pt daughter at bedside. Btracuauhtemoc GARCIA
Handoff received from offgoing RN. Charting as noted. Pt. received AxOx4, resting in stretcher, in NAD. Vital signs stable and as charted. Family at bedside. PRBC's infusing into R. peripheral IV, pt tolerating well, no noted signs of transfusion reaction. Pt denies pain. Pt. medicated as per orders for elevated BP. Breathing spontaneous, unlabored, and symmetrical on room air. Skin warm, dry, normal for race. Fall Precautions maintained. Call bell within reach. Educated pt. on plan of care, pt verbalized understanding. Pt to receive lasix IVP s/p PRBC's as per orders, and to be discharged after completion of PRBC's. Pt. safety and comfort measures maintained.

## 2020-05-11 ENCOUNTER — RESULT REVIEW (OUTPATIENT)
Age: 85
End: 2020-05-11

## 2020-05-11 ENCOUNTER — APPOINTMENT (OUTPATIENT)
Age: 85
End: 2020-05-11

## 2020-05-11 LAB
BASOPHILS # BLD AUTO: 0.1 K/UL — SIGNIFICANT CHANGE UP (ref 0–0.2)
BASOPHILS NFR BLD AUTO: 2.3 % — HIGH (ref 0–2)
EOSINOPHIL # BLD AUTO: 0.2 K/UL — SIGNIFICANT CHANGE UP (ref 0–0.5)
EOSINOPHIL NFR BLD AUTO: 3.6 % — SIGNIFICANT CHANGE UP (ref 0–6)
HCT VFR BLD CALC: 31.5 % — LOW (ref 39–50)
HGB BLD-MCNC: 9.9 G/DL — LOW (ref 13–17)
LYMPHOCYTES # BLD AUTO: 0.8 K/UL — LOW (ref 1–3.3)
LYMPHOCYTES # BLD AUTO: 12.9 % — LOW (ref 13–44)
MCHC RBC-ENTMCNC: 29.6 PG — SIGNIFICANT CHANGE UP (ref 27–34)
MCHC RBC-ENTMCNC: 31.2 G/DL — LOW (ref 32–36)
MCV RBC AUTO: 94.9 FL — SIGNIFICANT CHANGE UP (ref 80–100)
MONOCYTES # BLD AUTO: 0.7 K/UL — SIGNIFICANT CHANGE UP (ref 0–0.9)
MONOCYTES NFR BLD AUTO: 11.1 % — SIGNIFICANT CHANGE UP (ref 2–14)
NEUTROPHILS # BLD AUTO: 4.2 K/UL — SIGNIFICANT CHANGE UP (ref 1.8–7.4)
NEUTROPHILS NFR BLD AUTO: 70 % — SIGNIFICANT CHANGE UP (ref 43–77)
PLATELET # BLD AUTO: 219 K/UL — SIGNIFICANT CHANGE UP (ref 150–400)
RBC # BLD: 3.32 M/UL — LOW (ref 4.2–5.8)
RBC # FLD: 14.9 % — HIGH (ref 10.3–14.5)
WBC # BLD: 6 K/UL — SIGNIFICANT CHANGE UP (ref 3.8–10.5)
WBC # FLD AUTO: 6 K/UL — SIGNIFICANT CHANGE UP (ref 3.8–10.5)

## 2020-05-14 ENCOUNTER — APPOINTMENT (OUTPATIENT)
Dept: VASCULAR SURGERY | Facility: CLINIC | Age: 85
End: 2020-05-14
Payer: MEDICARE

## 2020-05-14 PROCEDURE — 93923 UPR/LXTR ART STDY 3+ LVLS: CPT

## 2020-05-19 ENCOUNTER — OUTPATIENT (OUTPATIENT)
Dept: OUTPATIENT SERVICES | Facility: HOSPITAL | Age: 85
LOS: 1 days | Discharge: HOME | End: 2020-05-19

## 2020-05-19 ENCOUNTER — APPOINTMENT (OUTPATIENT)
Dept: VASCULAR SURGERY | Facility: CLINIC | Age: 85
End: 2020-05-19
Payer: MEDICARE

## 2020-05-19 VITALS
HEART RATE: 75 BPM | HEIGHT: 66 IN | WEIGHT: 159 LBS | SYSTOLIC BLOOD PRESSURE: 157 MMHG | DIASTOLIC BLOOD PRESSURE: 61 MMHG | BODY MASS INDEX: 25.55 KG/M2 | OXYGEN SATURATION: 99 % | TEMPERATURE: 98.1 F

## 2020-05-19 DIAGNOSIS — I77.0 ARTERIOVENOUS FISTULA, ACQUIRED: Chronic | ICD-10-CM

## 2020-05-19 DIAGNOSIS — Z98.890 OTHER SPECIFIED POSTPROCEDURAL STATES: Chronic | ICD-10-CM

## 2020-05-19 DIAGNOSIS — D63.1 ANEMIA IN CHRONIC KIDNEY DISEASE: ICD-10-CM

## 2020-05-19 DIAGNOSIS — Z98.62 PERIPHERAL VASCULAR ANGIOPLASTY STATUS: Chronic | ICD-10-CM

## 2020-05-19 PROCEDURE — 99213 OFFICE O/P EST LOW 20 MIN: CPT

## 2020-05-22 ENCOUNTER — OUTPATIENT (OUTPATIENT)
Dept: OUTPATIENT SERVICES | Facility: HOSPITAL | Age: 85
LOS: 1 days | End: 2020-05-22
Payer: MEDICARE

## 2020-05-22 ENCOUNTER — APPOINTMENT (OUTPATIENT)
Dept: MRI IMAGING | Facility: CLINIC | Age: 85
End: 2020-05-22
Payer: MEDICARE

## 2020-05-22 DIAGNOSIS — Z98.62 PERIPHERAL VASCULAR ANGIOPLASTY STATUS: Chronic | ICD-10-CM

## 2020-05-22 DIAGNOSIS — Z98.890 OTHER SPECIFIED POSTPROCEDURAL STATES: Chronic | ICD-10-CM

## 2020-05-22 DIAGNOSIS — I77.0 ARTERIOVENOUS FISTULA, ACQUIRED: Chronic | ICD-10-CM

## 2020-05-22 DIAGNOSIS — Z00.8 ENCOUNTER FOR OTHER GENERAL EXAMINATION: ICD-10-CM

## 2020-05-22 PROCEDURE — 75557 CARDIAC MRI FOR MORPH: CPT

## 2020-05-22 PROCEDURE — 75557 CARDIAC MRI FOR MORPH: CPT | Mod: 26

## 2020-05-26 ENCOUNTER — APPOINTMENT (OUTPATIENT)
Age: 85
End: 2020-05-26

## 2020-05-26 ENCOUNTER — RESULT REVIEW (OUTPATIENT)
Age: 85
End: 2020-05-26

## 2020-05-26 ENCOUNTER — APPOINTMENT (OUTPATIENT)
Dept: HEMATOLOGY ONCOLOGY | Facility: CLINIC | Age: 85
End: 2020-05-26

## 2020-05-26 LAB
BASOPHILS # BLD AUTO: 0.1 K/UL — SIGNIFICANT CHANGE UP (ref 0–0.2)
BASOPHILS NFR BLD AUTO: 1 % — SIGNIFICANT CHANGE UP (ref 0–2)
EOSINOPHIL # BLD AUTO: 0.2 K/UL — SIGNIFICANT CHANGE UP (ref 0–0.5)
EOSINOPHIL NFR BLD AUTO: 1.8 % — SIGNIFICANT CHANGE UP (ref 0–6)
HCT VFR BLD CALC: 27.3 % — LOW (ref 39–50)
HGB BLD-MCNC: 8.7 G/DL — LOW (ref 13–17)
LYMPHOCYTES # BLD AUTO: 0.9 K/UL — LOW (ref 1–3.3)
LYMPHOCYTES # BLD AUTO: 7.4 % — LOW (ref 13–44)
MCHC RBC-ENTMCNC: 29.2 PG — SIGNIFICANT CHANGE UP (ref 27–34)
MCHC RBC-ENTMCNC: 31.8 G/DL — LOW (ref 32–36)
MCV RBC AUTO: 91.7 FL — SIGNIFICANT CHANGE UP (ref 80–100)
MONOCYTES # BLD AUTO: 0.7 K/UL — SIGNIFICANT CHANGE UP (ref 0–0.9)
MONOCYTES NFR BLD AUTO: 5.9 % — SIGNIFICANT CHANGE UP (ref 2–14)
NEUTROPHILS # BLD AUTO: 9.8 K/UL — HIGH (ref 1.8–7.4)
NEUTROPHILS NFR BLD AUTO: 83.9 % — HIGH (ref 43–77)
PLATELET # BLD AUTO: 212 K/UL — SIGNIFICANT CHANGE UP (ref 150–400)
RBC # BLD: 2.98 M/UL — LOW (ref 4.2–5.8)
RBC # FLD: 15.8 % — HIGH (ref 10.3–14.5)
WBC # BLD: 11.7 K/UL — HIGH (ref 3.8–10.5)
WBC # FLD AUTO: 11.7 K/UL — HIGH (ref 3.8–10.5)

## 2020-05-27 DIAGNOSIS — N18.4 CHRONIC KIDNEY DISEASE, STAGE 4 (SEVERE): ICD-10-CM

## 2020-05-27 DIAGNOSIS — N18.5 CHRONIC KIDNEY DISEASE, STAGE 5: ICD-10-CM

## 2020-05-29 ENCOUNTER — APPOINTMENT (OUTPATIENT)
Dept: NEPHROLOGY | Facility: CLINIC | Age: 85
End: 2020-05-29
Payer: MEDICARE

## 2020-05-29 VITALS
HEART RATE: 78 BPM | BODY MASS INDEX: 26.03 KG/M2 | HEIGHT: 66 IN | SYSTOLIC BLOOD PRESSURE: 152 MMHG | DIASTOLIC BLOOD PRESSURE: 62 MMHG | WEIGHT: 162 LBS

## 2020-05-29 PROCEDURE — 99214 OFFICE O/P EST MOD 30 MIN: CPT | Mod: 25

## 2020-05-29 PROCEDURE — 36415 COLL VENOUS BLD VENIPUNCTURE: CPT

## 2020-05-29 NOTE — PHYSICAL EXAM
[General Appearance - Alert] : alert [Strabismus] : no strabismus was seen [Outer Ear] : the ears and nose were normal in appearance [Neck Appearance] : the appearance of the neck was normal [Auscultation Breath Sounds / Voice Sounds] : lungs were clear to auscultation bilaterally [Heart Sounds] : normal S1 and S2 [Edema] : there was no peripheral edema [Bowel Sounds] : normal bowel sounds [Abdomen Tenderness] : non-tender [Cervical Lymph Nodes Enlarged Anterior Bilaterally] : anterior cervical [Cervical Lymph Nodes Enlarged Posterior Bilaterally] : posterior cervical [___ (cm) Fistula] : [unfilled] (cm) fistula [Nail Clubbing] : no clubbing  or cyanosis of the fingernails [] : no rash [No Focal Deficits] : no focal deficits [FreeTextEntry1] : dry mucus membranes

## 2020-05-29 NOTE — ASSESSMENT
[FreeTextEntry1] : 85 year old man with advanced CKD with LUE AVF; family has been refusing dialysis\par Pt with recent hospitalization for CHF exacerbation presenting for follow up\par Pt currently appears euvolemic; likely over diuresed as suggested by symptoms\par Recommend to decrease Torsemide to 20 mg daily; cut Metolazone to 2.5 mg once a week\par Pt instructed to check weight daily and increase dose to 20 mg bid if weights uptrend/  development of leg edema or dyspnea.\par Repeat renal panel and CBC today; K+ was 3.2 on discharge.\par BP stable; continue current regimen\par Instructed to take bicarb dose to 650 mg tid. Spoke with pharmacy; drug doesn’t come in 1300 mg dosage as per conversation with pharmacy.\par Last phos level stable at 4.5 on Sevelamer 800 ; continue same dose for now.\par Opthalmology follow up .\par Hematology follow up for Aranesp.\par \par Increase Lipitor 80 mg.,\par \par Continue Pletel,\par \par PAD (peripheral artery disease) (443.9) (I73.9)\par Claudication of right lower extremity (443.9) (I73.9)\par \par 85 y/o male with new onset RLE PAD with claudication unrelieved by rest. He will start Cilostazol 100 mg BID for walk pain. He will continue to walk as much as possible and RTC in 6 weeks to see if this assists in pain, if not will need to complete angiogram, possible angioplasty, possible bypass. He will continue with medical management of cholesterol, CKD and anemia. \par \par \par Plan:\par \par Continue ASA and Atorvastatin daily\par Continue medical management for ESRD, cholesterol, DM and HTN\par Follow a balanced diet and appropriate hydration as per Nephrology.\par Walk for exercise on a daily basis, which improves HDL and reduces LDL\par Will complete U/S Carotid Duplex at next visit - Per Dr. Sorenson, \par \par Will coordinate Revascularization, \par \par

## 2020-05-29 NOTE — REVIEW OF SYSTEMS
[Feeling Tired] : feeling tired [Recent Weight Loss (___ Lbs)] : recent [unfilled] ~Ulb weight loss [Constipation] : constipation [Fever] : no fever [Eye Pain] : no eye pain [Chills] : no chills [Earache] : no earache [Nosebleeds] : no nosebleeds [Nasal Discharge] : no nasal discharge [Sore Throat] : no sore throat [Hoarseness] : no hoarseness [Heart Rate Is Slow] : the heart rate was not slow [Heart Rate Is Fast] : the heart rate was not fast [Chest Pain] : no chest pain [Palpitations] : no palpitations [Lower Ext Edema] : no extremity edema [Shortness Of Breath] : no shortness of breath [Cough] : no cough [SOB on Exertion] : no shortness of breath during exertion [Abdominal Pain] : no abdominal pain [Orthopnea] : no orthopnea [Diarrhea] : no diarrhea [Heartburn] : no heartburn [Melena] : no melena [Dysuria] : no dysuria [Incontinence] : no incontinence [Hesitancy] : no urinary hesitancy [Arthralgias] : no arthralgias [Joint Pain] : no joint pain [Joint Swelling] : no joint swelling [Joint Stiffness] : no joint stiffness [Skin Lesions] : no skin lesions [Itching] : no itching [Fainting] : no fainting [Dizziness] : no dizziness [Limb Weakness] : no limb weakness [Difficulty Walking] : no difficulty walking [Anxiety] : no anxiety [Depression] : no depression [Easy Bleeding] : no tendency for easy bleeding [Easy Bruising] : no tendency for easy bruising

## 2020-05-30 LAB
25(OH)D3 SERPL-MCNC: 20.3 NG/ML
ALBUMIN SERPL ELPH-MCNC: 4.3 G/DL
ANION GAP SERPL CALC-SCNC: 22 MMOL/L
BASOPHILS # BLD AUTO: 0.1 K/UL
BASOPHILS NFR BLD AUTO: 1.1 %
BUN SERPL-MCNC: 105 MG/DL
CALCIUM SERPL-MCNC: 9.7 MG/DL
CALCIUM SERPL-MCNC: 9.7 MG/DL
CHLORIDE SERPL-SCNC: 98 MMOL/L
CHOLEST SERPL-MCNC: 85 MG/DL
CHOLEST/HDLC SERPL: 1.8 RATIO
CO2 SERPL-SCNC: 21 MMOL/L
CREAT SERPL-MCNC: 6.05 MG/DL
EOSINOPHIL # BLD AUTO: 0.27 K/UL
EOSINOPHIL NFR BLD AUTO: 3 %
GLUCOSE SERPL-MCNC: 250 MG/DL
HCT VFR BLD CALC: 28.2 %
HDLC SERPL-MCNC: 49 MG/DL
HGB BLD-MCNC: 8.4 G/DL
IMM GRANULOCYTES NFR BLD AUTO: 0.6 %
LDLC SERPL CALC-MCNC: 20 MG/DL
LYMPHOCYTES # BLD AUTO: 0.88 K/UL
LYMPHOCYTES NFR BLD AUTO: 9.8 %
MAN DIFF?: NORMAL
MCHC RBC-ENTMCNC: 28.8 PG
MCHC RBC-ENTMCNC: 29.8 GM/DL
MCV RBC AUTO: 96.6 FL
MONOCYTES # BLD AUTO: 0.74 K/UL
MONOCYTES NFR BLD AUTO: 8.2 %
NEUTROPHILS # BLD AUTO: 6.96 K/UL
NEUTROPHILS NFR BLD AUTO: 77.3 %
PARATHYROID HORMONE INTACT: 109 PG/ML
PHOSPHATE SERPL-MCNC: 5.4 MG/DL
PLATELET # BLD AUTO: 233 K/UL
POTASSIUM SERPL-SCNC: 4 MMOL/L
RBC # BLD: 2.92 M/UL
RBC # FLD: 16.9 %
SODIUM SERPL-SCNC: 141 MMOL/L
TRIGL SERPL-MCNC: 85 MG/DL
WBC # FLD AUTO: 9 K/UL

## 2020-06-07 DIAGNOSIS — Z01.818 ENCOUNTER FOR OTHER PREPROCEDURAL EXAMINATION: ICD-10-CM

## 2020-06-08 ENCOUNTER — INPATIENT (INPATIENT)
Facility: HOSPITAL | Age: 85
LOS: 1 days | Discharge: HOME | DRG: 252 | End: 2020-06-10
Attending: HOSPITALIST | Admitting: HOSPITALIST
Payer: MEDICARE

## 2020-06-08 VITALS
DIASTOLIC BLOOD PRESSURE: 54 MMHG | HEART RATE: 104 BPM | TEMPERATURE: 98 F | RESPIRATION RATE: 20 BRPM | OXYGEN SATURATION: 94 % | HEIGHT: 65 IN | WEIGHT: 153 LBS | SYSTOLIC BLOOD PRESSURE: 157 MMHG

## 2020-06-08 DIAGNOSIS — Z98.890 OTHER SPECIFIED POSTPROCEDURAL STATES: Chronic | ICD-10-CM

## 2020-06-08 DIAGNOSIS — M79.604 PAIN IN RIGHT LEG: ICD-10-CM

## 2020-06-08 DIAGNOSIS — Z98.62 PERIPHERAL VASCULAR ANGIOPLASTY STATUS: Chronic | ICD-10-CM

## 2020-06-08 DIAGNOSIS — I77.0 ARTERIOVENOUS FISTULA, ACQUIRED: Chronic | ICD-10-CM

## 2020-06-08 LAB
ALBUMIN SERPL ELPH-MCNC: 3.9 G/DL — SIGNIFICANT CHANGE UP (ref 3.3–5.2)
ALP SERPL-CCNC: 93 U/L — SIGNIFICANT CHANGE UP (ref 40–120)
ALT FLD-CCNC: 46 U/L — HIGH
ANION GAP SERPL CALC-SCNC: 21 MMOL/L — HIGH (ref 5–17)
APPEARANCE UR: ABNORMAL
APTT BLD: 22.1 SEC — LOW (ref 27.5–36.3)
AST SERPL-CCNC: 59 U/L — HIGH
BACTERIA # UR AUTO: ABNORMAL
BASOPHILS # BLD AUTO: 0.09 K/UL — SIGNIFICANT CHANGE UP (ref 0–0.2)
BASOPHILS NFR BLD AUTO: 1 % — SIGNIFICANT CHANGE UP (ref 0–2)
BILIRUB SERPL-MCNC: 0.3 MG/DL — LOW (ref 0.4–2)
BILIRUB UR-MCNC: NEGATIVE — SIGNIFICANT CHANGE UP
BLD GP AB SCN SERPL QL: SIGNIFICANT CHANGE UP
BUN SERPL-MCNC: 104 MG/DL — HIGH (ref 8–20)
CALCIUM SERPL-MCNC: 9.3 MG/DL — SIGNIFICANT CHANGE UP (ref 8.6–10.2)
CHLORIDE SERPL-SCNC: 93 MMOL/L — LOW (ref 98–107)
CO2 SERPL-SCNC: 20 MMOL/L — LOW (ref 22–29)
COLOR SPEC: YELLOW — SIGNIFICANT CHANGE UP
CREAT SERPL-MCNC: 5.94 MG/DL — HIGH (ref 0.5–1.3)
DIFF PNL FLD: NEGATIVE — SIGNIFICANT CHANGE UP
EOSINOPHIL # BLD AUTO: 0.22 K/UL — SIGNIFICANT CHANGE UP (ref 0–0.5)
EOSINOPHIL NFR BLD AUTO: 2.5 % — SIGNIFICANT CHANGE UP (ref 0–6)
EPI CELLS # UR: SIGNIFICANT CHANGE UP
GLUCOSE BLDC GLUCOMTR-MCNC: 176 MG/DL — HIGH (ref 70–99)
GLUCOSE BLDC GLUCOMTR-MCNC: 246 MG/DL — HIGH (ref 70–99)
GLUCOSE SERPL-MCNC: 342 MG/DL — HIGH (ref 70–99)
GLUCOSE UR QL: 250 MG/DL
HCT VFR BLD CALC: 25.8 % — LOW (ref 39–50)
HGB BLD-MCNC: 8 G/DL — LOW (ref 13–17)
IMM GRANULOCYTES NFR BLD AUTO: 0.3 % — SIGNIFICANT CHANGE UP (ref 0–1.5)
INR BLD: 0.99 RATIO — SIGNIFICANT CHANGE UP (ref 0.88–1.16)
KETONES UR-MCNC: NEGATIVE — SIGNIFICANT CHANGE UP
LEUKOCYTE ESTERASE UR-ACNC: NEGATIVE — SIGNIFICANT CHANGE UP
LYMPHOCYTES # BLD AUTO: 0.77 K/UL — LOW (ref 1–3.3)
LYMPHOCYTES # BLD AUTO: 8.8 % — LOW (ref 13–44)
MCHC RBC-ENTMCNC: 29.6 PG — SIGNIFICANT CHANGE UP (ref 27–34)
MCHC RBC-ENTMCNC: 31 GM/DL — LOW (ref 32–36)
MCV RBC AUTO: 95.6 FL — SIGNIFICANT CHANGE UP (ref 80–100)
MONOCYTES # BLD AUTO: 0.66 K/UL — SIGNIFICANT CHANGE UP (ref 0–0.9)
MONOCYTES NFR BLD AUTO: 7.5 % — SIGNIFICANT CHANGE UP (ref 2–14)
NEUTROPHILS # BLD AUTO: 6.99 K/UL — SIGNIFICANT CHANGE UP (ref 1.8–7.4)
NEUTROPHILS NFR BLD AUTO: 79.9 % — HIGH (ref 43–77)
NITRITE UR-MCNC: NEGATIVE — SIGNIFICANT CHANGE UP
NT-PROBNP SERPL-SCNC: HIGH PG/ML (ref 0–300)
PH UR: 5 — SIGNIFICANT CHANGE UP (ref 5–8)
PLATELET # BLD AUTO: 252 K/UL — SIGNIFICANT CHANGE UP (ref 150–400)
POTASSIUM SERPL-MCNC: 4.9 MMOL/L — SIGNIFICANT CHANGE UP (ref 3.5–5.3)
POTASSIUM SERPL-SCNC: 4.9 MMOL/L — SIGNIFICANT CHANGE UP (ref 3.5–5.3)
PROT SERPL-MCNC: 6.9 G/DL — SIGNIFICANT CHANGE UP (ref 6.6–8.7)
PROT UR-MCNC: 100 MG/DL
PROTHROM AB SERPL-ACNC: 11.2 SEC — SIGNIFICANT CHANGE UP (ref 10–12.9)
RBC # BLD: 2.7 M/UL — LOW (ref 4.2–5.8)
RBC # FLD: 16.9 % — HIGH (ref 10.3–14.5)
RBC CASTS # UR COMP ASSIST: SIGNIFICANT CHANGE UP /HPF (ref 0–4)
SARS-COV-2 RNA SPEC QL NAA+PROBE: SIGNIFICANT CHANGE UP
SODIUM SERPL-SCNC: 134 MMOL/L — LOW (ref 135–145)
SP GR SPEC: 1.01 — SIGNIFICANT CHANGE UP (ref 1.01–1.02)
TROPONIN T SERPL-MCNC: 0.09 NG/ML — HIGH (ref 0–0.06)
TROPONIN T SERPL-MCNC: 0.09 NG/ML — HIGH (ref 0–0.06)
UROBILINOGEN FLD QL: NEGATIVE MG/DL — SIGNIFICANT CHANGE UP
WBC # BLD: 8.76 K/UL — SIGNIFICANT CHANGE UP (ref 3.8–10.5)
WBC # FLD AUTO: 8.76 K/UL — SIGNIFICANT CHANGE UP (ref 3.8–10.5)
WBC UR QL: SIGNIFICANT CHANGE UP

## 2020-06-08 PROCEDURE — 99285 EMERGENCY DEPT VISIT HI MDM: CPT

## 2020-06-08 PROCEDURE — 93010 ELECTROCARDIOGRAM REPORT: CPT

## 2020-06-08 PROCEDURE — 71046 X-RAY EXAM CHEST 2 VIEWS: CPT | Mod: 26

## 2020-06-08 PROCEDURE — 99223 1ST HOSP IP/OBS HIGH 75: CPT

## 2020-06-08 RX ORDER — IPRATROPIUM/ALBUTEROL SULFATE 18-103MCG
3 AEROSOL WITH ADAPTER (GRAM) INHALATION EVERY 6 HOURS
Refills: 0 | Status: DISCONTINUED | OUTPATIENT
Start: 2020-06-08 | End: 2020-06-10

## 2020-06-08 RX ORDER — DEXTROSE 50 % IN WATER 50 %
12.5 SYRINGE (ML) INTRAVENOUS ONCE
Refills: 0 | Status: DISCONTINUED | OUTPATIENT
Start: 2020-06-08 | End: 2020-06-09

## 2020-06-08 RX ORDER — CILOSTAZOL 100 MG/1
100 TABLET ORAL
Refills: 0 | Status: DISCONTINUED | OUTPATIENT
Start: 2020-06-08 | End: 2020-06-10

## 2020-06-08 RX ORDER — CALCITRIOL 0.5 UG/1
0.25 CAPSULE ORAL DAILY
Refills: 0 | Status: DISCONTINUED | OUTPATIENT
Start: 2020-06-08 | End: 2020-06-10

## 2020-06-08 RX ORDER — DEXTROSE 50 % IN WATER 50 %
15 SYRINGE (ML) INTRAVENOUS ONCE
Refills: 0 | Status: DISCONTINUED | OUTPATIENT
Start: 2020-06-08 | End: 2020-06-09

## 2020-06-08 RX ORDER — NIFEDIPINE 30 MG
90 TABLET, EXTENDED RELEASE 24 HR ORAL DAILY
Refills: 0 | Status: DISCONTINUED | OUTPATIENT
Start: 2020-06-08 | End: 2020-06-10

## 2020-06-08 RX ORDER — FUROSEMIDE 40 MG
40 TABLET ORAL ONCE
Refills: 0 | Status: COMPLETED | OUTPATIENT
Start: 2020-06-08 | End: 2020-06-08

## 2020-06-08 RX ORDER — SODIUM BICARBONATE 1 MEQ/ML
1300 SYRINGE (ML) INTRAVENOUS EVERY 12 HOURS
Refills: 0 | Status: DISCONTINUED | OUTPATIENT
Start: 2020-06-08 | End: 2020-06-10

## 2020-06-08 RX ORDER — DEXTROSE 50 % IN WATER 50 %
25 SYRINGE (ML) INTRAVENOUS ONCE
Refills: 0 | Status: DISCONTINUED | OUTPATIENT
Start: 2020-06-08 | End: 2020-06-09

## 2020-06-08 RX ORDER — GLUCAGON INJECTION, SOLUTION 0.5 MG/.1ML
1 INJECTION, SOLUTION SUBCUTANEOUS ONCE
Refills: 0 | Status: DISCONTINUED | OUTPATIENT
Start: 2020-06-08 | End: 2020-06-09

## 2020-06-08 RX ORDER — ATORVASTATIN CALCIUM 80 MG/1
40 TABLET, FILM COATED ORAL AT BEDTIME
Refills: 0 | Status: DISCONTINUED | OUTPATIENT
Start: 2020-06-08 | End: 2020-06-08

## 2020-06-08 RX ORDER — HYDRALAZINE HCL 50 MG
50 TABLET ORAL THREE TIMES A DAY
Refills: 0 | Status: DISCONTINUED | OUTPATIENT
Start: 2020-06-08 | End: 2020-06-10

## 2020-06-08 RX ORDER — INSULIN LISPRO 100/ML
VIAL (ML) SUBCUTANEOUS AT BEDTIME
Refills: 0 | Status: DISCONTINUED | OUTPATIENT
Start: 2020-06-08 | End: 2020-06-09

## 2020-06-08 RX ORDER — ASPIRIN/CALCIUM CARB/MAGNESIUM 324 MG
81 TABLET ORAL DAILY
Refills: 0 | Status: DISCONTINUED | OUTPATIENT
Start: 2020-06-08 | End: 2020-06-10

## 2020-06-08 RX ORDER — ASCORBIC ACID 60 MG
250 TABLET,CHEWABLE ORAL DAILY
Refills: 0 | Status: DISCONTINUED | OUTPATIENT
Start: 2020-06-08 | End: 2020-06-10

## 2020-06-08 RX ORDER — FERROUS SULFATE 325(65) MG
325 TABLET ORAL THREE TIMES A DAY
Refills: 0 | Status: DISCONTINUED | OUTPATIENT
Start: 2020-06-08 | End: 2020-06-10

## 2020-06-08 RX ORDER — ATORVASTATIN CALCIUM 80 MG/1
80 TABLET, FILM COATED ORAL AT BEDTIME
Refills: 0 | Status: DISCONTINUED | OUTPATIENT
Start: 2020-06-08 | End: 2020-06-10

## 2020-06-08 RX ORDER — ISOSORBIDE MONONITRATE 60 MG/1
30 TABLET, EXTENDED RELEASE ORAL
Refills: 0 | Status: DISCONTINUED | OUTPATIENT
Start: 2020-06-08 | End: 2020-06-10

## 2020-06-08 RX ORDER — INSULIN LISPRO 100/ML
VIAL (ML) SUBCUTANEOUS
Refills: 0 | Status: DISCONTINUED | OUTPATIENT
Start: 2020-06-08 | End: 2020-06-09

## 2020-06-08 RX ORDER — SODIUM CHLORIDE 9 MG/ML
1000 INJECTION, SOLUTION INTRAVENOUS
Refills: 0 | Status: DISCONTINUED | OUTPATIENT
Start: 2020-06-08 | End: 2020-06-09

## 2020-06-08 RX ORDER — PANTOPRAZOLE SODIUM 20 MG/1
40 TABLET, DELAYED RELEASE ORAL
Refills: 0 | Status: DISCONTINUED | OUTPATIENT
Start: 2020-06-08 | End: 2020-06-10

## 2020-06-08 RX ORDER — NIFEDIPINE 30 MG
60 TABLET, EXTENDED RELEASE 24 HR ORAL AT BEDTIME
Refills: 0 | Status: DISCONTINUED | OUTPATIENT
Start: 2020-06-08 | End: 2020-06-10

## 2020-06-08 RX ORDER — ACETAMINOPHEN 500 MG
1000 TABLET ORAL ONCE
Refills: 0 | Status: COMPLETED | OUTPATIENT
Start: 2020-06-08 | End: 2020-06-09

## 2020-06-08 RX ADMIN — ATORVASTATIN CALCIUM 80 MILLIGRAM(S): 80 TABLET, FILM COATED ORAL at 21:49

## 2020-06-08 RX ADMIN — Medication 60 MILLIGRAM(S): at 21:48

## 2020-06-08 RX ADMIN — Medication 0.1 MILLIGRAM(S): at 21:49

## 2020-06-08 RX ADMIN — Medication 3 MILLILITER(S): at 20:57

## 2020-06-08 RX ADMIN — CILOSTAZOL 100 MILLIGRAM(S): 100 TABLET ORAL at 18:12

## 2020-06-08 RX ADMIN — Medication 2: at 17:34

## 2020-06-08 RX ADMIN — Medication 325 MILLIGRAM(S): at 21:49

## 2020-06-08 RX ADMIN — Medication 40 MILLIGRAM(S): at 16:33

## 2020-06-08 RX ADMIN — ISOSORBIDE MONONITRATE 30 MILLIGRAM(S): 60 TABLET, EXTENDED RELEASE ORAL at 21:49

## 2020-06-08 RX ADMIN — Medication 50 MILLIGRAM(S): at 21:48

## 2020-06-08 RX ADMIN — Medication 40 MILLIGRAM(S): at 21:48

## 2020-06-08 RX ADMIN — Medication 20 MILLIGRAM(S): at 19:41

## 2020-06-08 RX ADMIN — Medication 1300 MILLIGRAM(S): at 18:12

## 2020-06-08 RX ADMIN — Medication 250 MILLIGRAM(S): at 18:11

## 2020-06-08 NOTE — CONSULT NOTE ADULT - ASSESSMENT
Pt with CKD stage V, admitted for anemia    1)CKD stage V- not on dialysis. BUN/Cr elevated. Family has been refusing HD in the past.   2) Acute on chronic anemia- Plan for PRBC transfusion. Gets Aranesp injections at Dr. Baker's office. Monitor H/H  3) Acute on chronic CHFpEF; start lasix 80 mg IV bid  4) PAD - Evaluated by vascular surgery. Plan for angiogram of RLE and possible angioplasty tomorrow  5) metabolic acidosis; po sodium bicarb 1300 mg bid    monitor BUN/Cr , lytes and UOP Pt with CKD stage V, admitted for anemia    1)CKD stage V- not on dialysis. BUN/Cr elevated. Family has been refusing HD in the past.   2) Acute on chronic anemia- Plan for PRBC transfusion. Gets Aranesp injections at Dr. Baker's office. Monitor H/H  3) Acute on chronic CHFpEF; agree with IV lasix post transfusion. Continue Torsemide 20 mg bid  4) PAD - Evaluated by vascular surgery. Plan for angiogram of RLE and possible angioplasty tomorrow  5) metabolic acidosis; po sodium bicarb 1300 mg bid    monitor BUN/Cr , lytes and UOP

## 2020-06-08 NOTE — CHART NOTE - NSCHARTNOTEFT_GEN_A_CORE
called by rn for patient with desaturation and wheeze    - start duoneb  - start chest pt   - continue to monitor on tele w/

## 2020-06-08 NOTE — H&P ADULT - HISTORY OF PRESENT ILLNESS
86 year old male with a history of CKD stage 5, anemia on chronic disease and previous GI bleed status post colonoscopy with two ulcerated polyps s/p resections and colonic AVMs, EGD with gastritis, VPCs coming to hospital with complaints of RLE pain. Per patient daughter who is RN low flow seen on outpatient US at Dr Sorenson office. Patient stating for past 2 weeks has been increasing intermittent shooting right lower extremity pain. States worst at night. Improves when has legs hanging over edge of seat worst when legs straight. of note patient also stating having exertional dyspnea and states can not lay flat at night time. denies cp nausea vomiting or diarrhea at this time.

## 2020-06-08 NOTE — ED PROVIDER NOTE - PHYSICAL EXAMINATION
General: well appearing, NAD  Head:  NC, AT  Eyes: EOMI, PERRLA, no scleral icterus  Ears: no erythema/drainage  Nose: midline, no bleeding/drainage  Throat: MMM  Cardiac: RRR, no m/r/g, no lower extremity edema  Respiratory: CTABL, no wheezes/rales/rhonchi, equal chest wall expansions  Abdomen: soft, ND, NT, no rebound tenderness, no guarding, nonperitonitic  MSK/Vascular: full ROM, soft compartments, warm extremities, nontender  RLE: +DP signal, negative PT signal  LLE:  +DP signal, negative PT signal  Neuro: AAOx3, motor/sensory intact  Psych: calm, cooperative, normal affect

## 2020-06-08 NOTE — ED PROVIDER NOTE - PROGRESS NOTE DETAILS
Rachael Martinez, Resident: Vascular surgery, cardiology and EP consulted. Pt requiring loop recorder as per daughter's discussion with EP outpatient. Pending labs and imaging. Rachael Martinez, Resident: Hgb 8, spoke with renal, agreed to give 1 u pRBC and diuresis. Spoke to hospitalist who agrees to admission.

## 2020-06-08 NOTE — ED ADULT NURSE NOTE - NSIMPLEMENTINTERV_GEN_ALL_ED
Implemented All Universal Safety Interventions:  [place] to call system. Call bell, personal items and telephone within reach. Instruct patient to call for assistance. Room bathroom lighting operational. Non-slip footwear when patient is off stretcher. Physically safe environment: no spills, clutter or unnecessary equipment. Stretcher in lowest position, wheels locked, appropriate side rails in place.

## 2020-06-08 NOTE — ED ADULT TRIAGE NOTE - PRO INTERPRETER NEED 2
- likely 2/2 pleural effusion and multi focal pna  along with some fluid overload   -tx for pna as above   -today, had some wheezing, add cxr for am to eval for worsening fluid build up   - c/w dialysis T/T/S; however, next session on monday per renal -likely 2/2 pleural effusion and multi focal pna  along with some fluid overload   -tx for pna as above   - c/w dialysis T/T/S; however, next session on monday per renal English likely 2/2 mutlifocal pneumonia w/ left pleural effusion  c/w cefipime 1 g IV Q24H w/ florastor (day 6 of antibiotics; day 3 of cefipime) s/p drainage, chest tube placement & subsequent removal without any complications.  c/w cefipime 1 g IV Q24H w/ florastor (day 6 of antibiotics; day 3 of cefipime)

## 2020-06-08 NOTE — CONSULT NOTE ADULT - SUBJECTIVE AND OBJECTIVE BOX
[facility] DIVISION OF KIDNEY DISEASES AND HYPERTENSION -- INITIAL CONSULT NOTE  --------------------------------------------------------------------------------  HPI:    86y Male with past medical history as under presenting with 3wk hx of b/l LE pain, more significant on his RLE. Patient reports shooting pain from the calf to the ankle, upon exertion and at rest. Patient was started on Cilostazol on last vascular f/u, without improvement of symptoms.  Pt was seen by cardiologist Dr. Peterson last week for worsening dyspnea and PND and his torsemide was increased to 20 mg bid without improvement. Pt is known to our service; he has followed with Dr. Avendano and myself for CKD V. He had a LUE AVF creation which has never been used. Pt seen and examined in ED; feels well. Denies any acute complaint at this time.        PAST HISTORY  --------------------------------------------------------------------------------  PAST MEDICAL & SURGICAL HISTORY:  Risk factors for obstructive sleep apnea  Anemia  RICHARDS (dyspnea on exertion)  VT (ventricular tachycardia)  HTN (hypertension)  CAD (coronary artery disease)  CKD (chronic kidney disease): stage IV  Arrhythmia  AV block, 1st degree  DM (diabetes mellitus)  H/O carotid endarterectomy: Right  A-V fistula: left arm 5/2017  H/O angioplasty: 2013,  no  intervention  H/O left knee surgery  H/O circumcision: at  age  65    FAMILY HISTORY:  Family history of premature CAD  Family history of lung cancer (Grandparent)  Family history of cancer (Grandparent)    PAST SOCIAL HISTORY: lives at home    ALLERGIES & MEDICATIONS  --------------------------------------------------------------------------------  Allergies    Plavix (Hives)  Toprol-XL (Rash)    Intolerances      Standing Inpatient Medications  furosemide   Injectable 40 milliGRAM(s) IV Push Once    PRN Inpatient Medications      REVIEW OF SYSTEMS  --------------------------------------------------------------------------------  Gen: No weight changes, fatigue, fevers/chills, weakness  Skin: No rashes  Head/Eyes/Ears/Mouth: No headache; Normal hearing; Normal vision w/o blurriness; No sinus pain/discomfort, sore throat  Respiratory: No dyspnea, cough, wheezing, hemoptysis  CV: No chest pain, PND, orthopnea  GI: No abdominal pain, diarrhea, constipation, nausea, vomiting, melena, hematochezia  : No increased frequency, dysuria, hematuria, nocturia  MSK: No joint pain/swelling; no back pain; no edema  Neuro: No dizziness/lightheadedness, weakness, seizures, numbness, tingling  Heme: No easy bruising or bleeding  Endo: No heat/cold intolerance  Psych: No significant nervousness, anxiety, stress, depression    All other systems were reviewed and are negative, except as noted.    VITALS/PHYSICAL EXAM  --------------------------------------------------------------------------------  T(C): 36.9 (06-08-20 @ 09:15), Max: 36.9 (06-08-20 @ 09:15)  HR: 104 (06-08-20 @ 09:15) (104 - 104)  BP: 157/54 (06-08-20 @ 09:15) (157/54 - 157/54)  RR: 20 (06-08-20 @ 09:15) (20 - 20)  SpO2: 94% (06-08-20 @ 09:15) (94% - 94%)  Wt(kg): --  Height (cm): 165.1 (06-08-20 @ 09:15)  Weight (kg): 69.4 (06-08-20 @ 09:15)  BMI (kg/m2): 25.5 (06-08-20 @ 09:15)  BSA (m2): 1.77 (06-08-20 @ 09:15)      Physical Exam:  	Gen: NAD, thin  	HEENT: PERRL, supple neck, clear oropharynx  	Pulm: b/l faint crackles  	CV: RRR, S1S2; no rub  	Back: No spinal or CVA tenderness;  	Abd: +BS, soft, nontender/nondistended  	: No suprapubic tenderness  	UE: Warm, no edema; no asterixis  	LE: Warm, no edema  	Neuro: No focal deficits, intact gait  	Psych: Normal affect and mood  	Skin: Warm, paler  	Vascular access: YOVANI AVF thrill/ bruit+    LABS/STUDIES  --------------------------------------------------------------------------------              8.0    8.76  >-----------<  252      [06-08-20 @ 09:57]              25.8     134  |  93  |  104.0  ----------------------------<  342      [06-08-20 @ 09:57]  4.9   |  20.0  |  5.94        Ca     9.3     [06-08-20 @ 09:57]    TPro  6.9  /  Alb  3.9  /  TBili  0.3  /  DBili  x   /  AST  59  /  ALT  46  /  AlkPhos  93  [06-08-20 @ 09:57]    PT/INR: PT 11.2 , INR 0.99       [06-08-20 @ 09:57]  PTT: 22.1       [06-08-20 @ 09:57]    Troponin 0.09      [06-08-20 @ 09:57]    Creatinine Trend:  SCr 5.94 [06-08 @ 09:57]    Urinalysis - [05-08-20 @ 16:53]      Color Yellow / Appearance Clear / SG 1.010 / pH 6.0      Gluc 100 / Ketone Negative  / Bili Negative / Urobili Negative       Blood Trace / Protein 100 / Leuk Est Negative / Nitrite Negative      RBC 0-2 / WBC 0-2 / Hyaline  / Gran  / Sq Epi  / Non Sq Epi Occasional / Bacteria Occasional      Iron 79, TIBC 289, %sat 27      [02-20-20 @ 21:56]  Ferritin 248      [02-20-20 @ 21:56]  HbA1c 4.9      [08-09-18 @ 05:54]    HBsAb <3.0      [08-09-18 @ 18:29]  HBsAg Nonreact      [08-09-18 @ 18:29]  HBcAb Nonreact      [08-09-18 @ 18:29]  HCV 0.09, Nonreact      [08-09-18 @ 18:29] [facility] DIVISION OF KIDNEY DISEASES AND HYPERTENSION -- INITIAL CONSULT NOTE  --------------------------------------------------------------------------------  HPI:    86y Male with past medical history as under presenting with 3wk hx of b/l LE pain, more significant on his RLE. Patient reports shooting pain from the calf to the ankle, upon exertion and at rest. Patient was started on Cilostazol on last vascular f/u, without improvement of symptoms.  Pt was seen by cardiologist Dr. Peterson last week for worsening dyspnea and PND and his torsemide was increased to 20 mg bid without improvement. Pt is known to our service; he has followed with Dr. Avendano and myself for CKD V. He had a LUE AVF creation which has never been used. Pt seen and examined in ED; feels well. Denies any acute complaint at this time.        PAST HISTORY  --------------------------------------------------------------------------------  PAST MEDICAL & SURGICAL HISTORY:  Risk factors for obstructive sleep apnea  Anemia  RICHARDS (dyspnea on exertion)  VT (ventricular tachycardia)  HTN (hypertension)  CAD (coronary artery disease)  CKD (chronic kidney disease): stage IV  Arrhythmia  AV block, 1st degree  DM (diabetes mellitus)  H/O carotid endarterectomy: Right  A-V fistula: left arm 5/2017  H/O angioplasty: 2013,  no  intervention  H/O left knee surgery  H/O circumcision: at  age  65    FAMILY HISTORY:  Family history of premature CAD  Family history of lung cancer (Grandparent)  Family history of cancer (Grandparent)    PAST SOCIAL HISTORY: lives at home    ALLERGIES & MEDICATIONS  --------------------------------------------------------------------------------  Allergies    Plavix (Hives)  Toprol-XL (Rash)    Intolerances      Standing Inpatient Medications  furosemide   Injectable 40 milliGRAM(s) IV Push Once    PRN Inpatient Medications      REVIEW OF SYSTEMS  --------------------------------------------------------------------------------  Gen: No weight changes, fatigue, fevers/chills, weakness  Skin: No rashes  Head/Eyes/Ears/Mouth: No headache; Normal hearing; Normal vision w/o blurriness; No sinus pain/discomfort, sore throat  Respiratory: No dyspnea, cough, wheezing, hemoptysis  CV: No chest pain, PND, orthopnea  GI: No abdominal pain, diarrhea, constipation, nausea, vomiting, melena, hematochezia  : No increased frequency, dysuria, hematuria, nocturia  MSK: No joint pain/swelling; no back pain; no edema  Neuro: No dizziness/lightheadedness, weakness, seizures, numbness, tingling  Heme: No easy bruising or bleeding  Endo: No heat/cold intolerance  Psych: No significant nervousness, anxiety, stress, depression    All other systems were reviewed and are negative, except as noted.    VITALS/PHYSICAL EXAM  --------------------------------------------------------------------------------  T(C): 36.9 (06-08-20 @ 09:15), Max: 36.9 (06-08-20 @ 09:15)  HR: 104 (06-08-20 @ 09:15) (104 - 104)  BP: 157/54 (06-08-20 @ 09:15) (157/54 - 157/54)  RR: 20 (06-08-20 @ 09:15) (20 - 20)  SpO2: 94% (06-08-20 @ 09:15) (94% - 94%)  Wt(kg): --  Height (cm): 165.1 (06-08-20 @ 09:15)  Weight (kg): 69.4 (06-08-20 @ 09:15)  BMI (kg/m2): 25.5 (06-08-20 @ 09:15)  BSA (m2): 1.77 (06-08-20 @ 09:15)      Physical Exam:  	Gen: NAD, thin  	HEENT: PERRL, supple neck, clear oropharynx  	Pulm: b/l faint crackles  	CV: RRR, S1S2;, DEANA+  	Back: No spinal or CVA tenderness;  	Abd: +BS, soft, nontender/nondistended  	: No suprapubic tenderness  	UE: Warm, no edema; no asterixis  	LE: Warm, no edema  	Neuro: No focal deficits, intact gait  	Psych: Normal affect and mood  	Skin: Warm, paler  	Vascular access: YOVANI AVF thrill/ bruit+    LABS/STUDIES  --------------------------------------------------------------------------------              8.0    8.76  >-----------<  252      [06-08-20 @ 09:57]              25.8     134  |  93  |  104.0  ----------------------------<  342      [06-08-20 @ 09:57]  4.9   |  20.0  |  5.94        Ca     9.3     [06-08-20 @ 09:57]    TPro  6.9  /  Alb  3.9  /  TBili  0.3  /  DBili  x   /  AST  59  /  ALT  46  /  AlkPhos  93  [06-08-20 @ 09:57]    PT/INR: PT 11.2 , INR 0.99       [06-08-20 @ 09:57]  PTT: 22.1       [06-08-20 @ 09:57]    Troponin 0.09      [06-08-20 @ 09:57]    Creatinine Trend:  SCr 5.94 [06-08 @ 09:57]    Urinalysis - [05-08-20 @ 16:53]      Color Yellow / Appearance Clear / SG 1.010 / pH 6.0      Gluc 100 / Ketone Negative  / Bili Negative / Urobili Negative       Blood Trace / Protein 100 / Leuk Est Negative / Nitrite Negative      RBC 0-2 / WBC 0-2 / Hyaline  / Gran  / Sq Epi  / Non Sq Epi Occasional / Bacteria Occasional      Iron 79, TIBC 289, %sat 27      [02-20-20 @ 21:56]  Ferritin 248      [02-20-20 @ 21:56]  HbA1c 4.9      [08-09-18 @ 05:54]    HBsAb <3.0      [08-09-18 @ 18:29]  HBsAg Nonreact      [08-09-18 @ 18:29]  HBcAb Nonreact      [08-09-18 @ 18:29]  HCV 0.09, Nonreact      [08-09-18 @ 18:29]

## 2020-06-08 NOTE — PHARMACOTHERAPY INTERVENTION NOTE - COMMENTS
Spoke with patient and patient's daughter at bedside for medication list. Spoke with patient and patient's daughter at bedside for medication list.  Patient's family reports last received Aranesp on 5/28/2020.

## 2020-06-08 NOTE — ED PROVIDER NOTE - NS ED ROS FT
Constitutional: no fever, sweats, and no chills.  Eyes: no pain, no redness, and no visual changes.  ENMT: no ear pain and no hearing problems, no nasal congestion/drainage, no dysphagia, and no throat pain, no neck pain, no stiffness  CV: no chest pain, no edema.  Resp: no cough, +dyspnea  GI: no abdominal pain, no bloating, no constipation, no diarrhea, no nausea and no vomiting.  : no dysuria, no hematuria  MSK: leg pain, no weakness  Skin: no lesions, and no rashes.  Neuro: no LOC, no headache, no sensory deficits, and no weakness.

## 2020-06-08 NOTE — ED PROVIDER NOTE - OBJECTIVE STATEMENT
Pt is an 86 y.o. M hx of DM, Afib, CAD, paraxosmal nocturnal dyspnea, first degree heart block, anemia, CEA, CKD s/p L. AVF not accessed four years ago  BLLE pain neuropathy R>L, calf to the ankle, 3.5 wks ago   Dr. Sorenson, low flow to right leg, Platil, cilostazol 100 mg BID    Dr. Davenport ([phone])  Allergies: Plavix, toprol Pt is an 86 y.o. M hx of DM, CAD, paraxosmal nocturnal dyspnea, first degree heart block, anemia, CEA, CKD s/p L. AVF four years ago not accessed, presenting with BLLE pain for 3.5 weeks. Pain in right leg greater than left, intermittently gets red, worse with exertion. Has been evaluated by podiatry, Dr. Devine who does not believe it is due to any ingrown nails or external causes. Follows with vascular surgery, Dr. Sorenson, states flow to the right leg is significantly decreased, the pt was started on cilostazol which has not improved his symptoms.  The pt states he has a history of anemia and may require a transfusion, +shortness of breath. Dr. Walker for EP.

## 2020-06-08 NOTE — H&P ADULT - NSHPPHYSICALEXAM_GEN_ALL_CORE
Vital Signs Last 24 Hrs  T(F): 98 (08 Jun 2020 14:10), Max: 98.4 (08 Jun 2020 09:15)  HR: 92 (08 Jun 2020 14:10) (90 - 104)  BP: 159/72 (08 Jun 2020 14:10) (137/63 - 179/79)  RR: 16 (08 Jun 2020 14:10) (16 - 20)  SpO2: 97% (08 Jun 2020 14:10) (94% - 97%)    Physical Exam:  Constitutional: NAD, awake and alert, well-developed  Neck: Soft and supple, No LAD, No JVD  Respiratory: cta b/l no wheezing no rhonchi  Cardiovascular: +s1/s2  +edema b/l le  Gastrointestinal: soft nt nd bs+  Vascular: 2+ peripheral pulses  Neurological: decreased sensation on right lower extremity  Musculoskeletal: 4/5 strength b/l upper and lower extremities  : Contraindicated  Breasts: Contraindicated  Rectal: Contraindicated

## 2020-06-08 NOTE — ED ADULT NURSE NOTE - OBJECTIVE STATEMENT
Pt arrives with c/o pain to R great toe, is noted to have decreased vascular flow to RLE as pt made staff aware and is to be admitted for a vascular procedure. Pt noted to have good sensory and color to LE's but noted to have pain to R great toe and decreased pulse to R side. Pt denies any other ailments.

## 2020-06-08 NOTE — H&P ADULT - NSICDXFAMILYHX_GEN_ALL_CORE_FT
FAMILY HISTORY:  Family history of premature CAD  FH: type 2 diabetes    Grandparent  Still living? Unknown  Family history of cancer, Age at diagnosis: Age Unknown  Family history of lung cancer, Age at diagnosis: Age Unknown

## 2020-06-08 NOTE — H&P ADULT - NSHPREVIEWOFSYSTEMS_GEN_ALL_CORE
Review of Systems:  CONSTITUTIONAL: No weakness, fevers or chills  EYES/ENT: No visual changes;  No vertigo or throat pain   NECK: No pain or stiffness  RESPIRATORY: No cough, wheezing, hemoptysis; +shortness of breath  CARDIOVASCULAR: No chest pain or palpitations  GASTROINTESTINAL: No abdominal or epigastric pain. No nausea, vomiting, or hematemesis; No diarrhea or constipation.   GENITOURINARY: No dysuria, frequency or hematuria  NEUROLOGICAL: +right lower extremity decreased sensation/ pain  SKIN: No itching, burning, rashes, or lesions   All other review of systems is negative unless indicated above

## 2020-06-08 NOTE — ED PROVIDER NOTE - FAMILY HISTORY
Family history of premature CAD     Grandparent  Still living? Unknown  Family history of cancer, Age at diagnosis: Age Unknown  Family history of lung cancer, Age at diagnosis: Age Unknown

## 2020-06-08 NOTE — CONSULT NOTE ADULT - SUBJECTIVE AND OBJECTIVE BOX
[facility]                              [address] [address]                                                 PHONE: [phone]                                                 FAX: [phone]  ------------------------------------------------------------------------------------------------    86y Male with past medical history as under presenting with 3wk hx of b/l LE pain, more significant on his RLE. Patient reports shooting pain from the calf to the ankle, upon exertion and at rest. Patient was started on Cilostazol on last vascular f/u, without improvement of symptoms. At the time of evaluation, pt reports shortness of breath and RICHARDS. He follows with Dr. Peterson.    PAST MEDICAL & SURGICAL HISTORY:  Risk factors for obstructive sleep apnea  Anemia  RICHARDS (dyspnea on exertion)  VT (ventricular tachycardia)  HTN (hypertension)  CAD (coronary artery disease)  CKD (chronic kidney disease): stage IV  Arrhythmia  AV block, 1st degree  DM (diabetes mellitus)  H/O carotid endarterectomy: Right  A-V fistula: left arm 5/2017  H/O angioplasty: 2013,  no  intervention  H/O left knee surgery  H/O circumcision: at  age  65      Plavix (Hives)  Toprol-XL (Rash)      Review of Systems:  Positive for shortness of breath, dyspnea on exertion, leg pain  Rest of the systems were reviewed and was negative.     Family history:   No pertinent family history in first degree relative of early CAD, sudden cardiac death or  congenital heart disease    Social History:  No smoking   No alcohol  No other drug use    Vital Signs Last 24 Hrs  T(C): 36.9 (08 Jun 2020 09:15), Max: 36.9 (08 Jun 2020 09:15)  T(F): 98.4 (08 Jun 2020 09:15), Max: 98.4 (08 Jun 2020 09:15)  HR: 104 (08 Jun 2020 09:15) (104 - 104)  BP: 157/54 (08 Jun 2020 09:15) (157/54 - 157/54)  BP(mean): --  RR: 20 (08 Jun 2020 09:15) (20 - 20)  SpO2: 94% (08 Jun 2020 09:15) (94% - 94%)    PHYSICAL EXAM:  Constitutional: Appears well developed, well nourished; alert and co-operative  HEENT:     Head: Normocephalic and atraumatic  Eyes: Conjunctiva normal, No scleral icterus  Neck: Supple, No JVD  Mouth/Throat: Mucous membranes are normal. Mucosa are not pale or dry.  Cardiovascular: regular S1, S2 + ESM  Respiratory: Lungs clear to auscultation; no crepitations, no wheeze  Gastrointestinal:  Soft, Non-tender, + BS	  Musculoskeletal: Normal range of motion. No edema  Skin: Warm and dry. No cyanosis . No diaphoresis.  Neurologic: Alert oriented to time place and person. Normal strength and no tremor.  Psychiatric: Normal mood and affect, Speech is normal and behavior is normal.        LABS:                        8.0    8.76  )-----------( 252      ( 08 Jun 2020 09:57 )             25.8     06-08    134<L>  |  93<L>  |  104.0<H>  ----------------------------<  342<H>  4.9   |  20.0<L>  |  5.94<H>    Ca    9.3      08 Jun 2020 09:57    TPro  6.9  /  Alb  3.9  /  TBili  0.3<L>  /  DBili  x   /  AST  59<H>  /  ALT  46<H>  /  AlkPhos  93  06-08    CARDIAC MARKERS ( 08 Jun 2020 09:57 )  x     / 0.09 ng/mL / x     / x     / x          PT/INR - ( 08 Jun 2020 09:57 )   PT: 11.2 sec;   INR: 0.99 ratio         PTT - ( 08 Jun 2020 09:57 )  PTT:22.1 sec    RADIOLOGY & ADDITIONAL STUDIES: (reviewed)  CXR was independently reviewed and demonstrated:  increased central and basilar congestion with reduction of left effusion and there is no evidence of pneumothorax nor right pleural effusion.    CARDIOLOGY TESTING:(reviewed)     ECG (Independent visualization): sinus tachycardia with LVH    Echocardiogram aortic valve area is approximately 1 cm². MRI with LVH and moderate AS    MEDICATIONS:(reviewed)  Aranesp 100 mcg/0.5 mL injectable solution: 1  injectable every 2 weeks (20 Feb 2020 14:37)  aspirin 81 mg oral delayed release tablet: 1 tab(s) orally once a day (20 Feb 2020 14:37)  atorvastatin 40 mg oral tablet: 1 tab(s) orally once a day (at bedtime) (20 Feb 2020 12:30)  calcitriol 0.25 mcg oral capsule: 1 cap(s) orally once a day (20 Feb 2020 12:30)  ferrous sulfate 325 mg (65 mg elemental iron) oral delayed release tablet: 1 tab(s) orally 3 times a day (20 Feb 2020 12:30)  hydrALAZINE 50 mg oral tablet: 1 tab(s) orally 3 times a day (20 Feb 2020 14:37)  metOLazone 2.5 mg oral tablet: 1 tab(s) orally once a week (20 Feb 2020 14:37)  Nephro-Thomas oral tablet: 1 tab(s) orally once a day (20 Feb 2020 12:30)  NIFEdipine 60 mg oral tablet, extended release: 1 tab(s) orally once a day (at bedtime) (20 Feb 2020 12:30)  NIFEdipine 90 mg oral tablet, extended release: 1 tab(s) orally once a day (20 Feb 2020 12:30)  pantoprazole 40 mg oral delayed release tablet: 1 tab(s) orally 3 times a day (before meals) (20 Feb 2020 14:37)  torsemide 20 mg oral tablet: 1 tab(s) orally once a day (20 Feb 2020 14:37)  Vitamin C 250 mg oral tablet: 1 tab(s) orally once a day (20 Feb 2020 12:30)  Vitamin D3 50,000 intl units oral capsule: 1 cap(s) orally once a week (20 Feb 2020 12:30)    MEDICATIONS  (STANDING):  furosemide   Injectable 40 milliGRAM(s) IV Push Once    MEDICATIONS  (PRN):

## 2020-06-08 NOTE — CONSULT NOTE ADULT - ASSESSMENT
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 Planned for vascular intervention.    CAD  - Continue ASA    PAD  - Was on cilostazol  - Evaluated by vascular. Plan for angiogram of RLE    HTN  - BP controlled    Dyslipidemia  - Continue statin    Shortness of breath  - Likely multifactorial including anemia, CKD, AS, volume overload  - Still in mild volume overload- agree with iv lasix for now    h/o VT  - ILR with Dr. Palma prior to d/c    Pre-op cardiovascular evaluation  - Pt remains at high risk given AS, CKD, anemia, h/o VT for vascular procedure but needs intervention given failure to improve on medical management.   - Close monitoring of BP and volume status  - Post-op ECG and telemetry monitoring

## 2020-06-08 NOTE — CONSULT NOTE ADULT - SUBJECTIVE AND OBJECTIVE BOX
VASCULAR SURGERY CONSULT    HPI: Patient is an 87 y/o M known to the Vascular service with PMH of DM, CAD, paroxysmal nocturnal dyspnea, first degree heart block, anemia, CEA, CKD s/p L. AVF four years ago not accessed, presenting with 3wk hx of b/l LE pain, more significant on his RLE. Patient reports shooting pain from the calf to the ankle, upon exertion and at rest. Patient was started on Cilostazol on last vascular f/u, without improvement of symptoms.     PAST MEDICAL HISTORY:  Risk factors for obstructive sleep apnea  Anemia  RICHARDS (dyspnea on exertion)  VT (ventricular tachycardia)  HTN (hypertension)  CAD (coronary artery disease)  CKD (chronic kidney disease)  Arrhythmia  AV block, 1st degree  DM (diabetes mellitus)      PAST SURGICAL HISTORY:  H/O carotid endarterectomy  A-V fistula  H/O angioplasty  H/O left knee surgery  H/O circumcision      ALLERGIES:  Plavix (Hives)  Toprol-XL (Rash)    MEDICATIONS  (STANDING):  furosemide   Injectable 40 milliGRAM(s) IV Push Once    MEDICATIONS  (PRN):      VITALS & I/Os:  Vital Signs Last 24 Hrs  T(C): 36.9 (08 Jun 2020 09:15), Max: 36.9 (08 Jun 2020 09:15)  T(F): 98.4 (08 Jun 2020 09:15), Max: 98.4 (08 Jun 2020 09:15)  HR: 104 (08 Jun 2020 09:15) (104 - 104)  BP: 157/54 (08 Jun 2020 09:15) (157/54 - 157/54)  BP(mean): --  RR: 20 (08 Jun 2020 09:15) (20 - 20)  SpO2: 94% (08 Jun 2020 09:15) (94% - 94%)  CAPILLARY BLOOD GLUCOSE      PHYSICAL EXAM      GENERAL: Alert, well developed, in no acute distress.  RESPIRATORY: CTAB. No wheezing, rales or rhonchi.  CARDIOVASCULAR: RRR. No audible murmurs, rubs or gallops.   GASTROINTESTINAL: ND/NT  MUSCULOSKELETAL: No cyanosis. No gross deformities. Moves all extremities spontaneously. Sensation intact bilaterally. Pitting edema +2 ankle and feet. Doppler signal left DP/PT biphasic, Right DP monophasic, PT no signal      LABS:                        8.0    8.76  )-----------( 252      ( 08 Jun 2020 09:57 )             25.8     06-08    134<L>  |  93<L>  |  104.0<H>  ----------------------------<  342<H>  4.9   |  20.0<L>  |  5.94<H>    Ca    9.3      08 Jun 2020 09:57    TPro  6.9  /  Alb  3.9  /  TBili  0.3<L>  /  DBili  x   /  AST  59<H>  /  ALT  46<H>  /  AlkPhos  93  06-08    Lactate: furosemide   Injectable 40 milliGRAM(s) IV Push Once         CARDIAC MARKERS ( 08 Jun 2020 09:57 )  x     / 0.09 ng/mL / x     / x     / x          IMAGING:    Arterial   Right: relatively flat waveforms

## 2020-06-08 NOTE — ED PROVIDER NOTE - CLINICAL SUMMARY MEDICAL DECISION MAKING FREE TEXT BOX
Pt is an 86 y.o. M presenting with BLLE pain likely secondary to poor blood flow as the pt has been evaluate by vascular surgery in the past for similar issues. Will evaluate for shortness of breath in the setting of renal disease. Labs, imaging, ekg. Will follow up, likely medicine admission with vascular

## 2020-06-08 NOTE — H&P ADULT - ASSESSMENT
86 year old male with a history of CKD stage 5, anemia on chronic disease and previous GI bleed status post colonoscopy with two ulcerated polyps s/p resections and colonic AVMs, EGD with gastritis, VPCs coming to hospital with complaints of RLE pain. Per patient nitesh who is RN low flow seen on outpatient US at Dr Sorenson office. Patient stating for past 2 weeks has been increasing intermittent shooting right lower extremity pain. States worst at night. Improves when has legs hanging over edge of seat worst when legs straight. of note patient also stating having exertional dyspnea and states can not lay flat at night time. denies cp nausea vomiting or diarrhea at this time.    #RLE pain   - admit to medicine   - w/ claudication probable 2/2 PAD  - vascular consult appreciated- for angiogram & angioplasty in AM   - NPO aftermidnight     #elevated blood glucose  - check a1c  - iss  - monitor fingersticks    #troponinemia  - probable 2/2 demand  - trend    #Shortness of breath  - Likely multifactorial including anemia, CKD, AS, volume overload  - Still in mild volume overload- agree with iv lasix for now    #CKD stage 5  - not on HD (per family request)  - nephro consult appreciated (d/w Dr Mcnulty give lasix post transfusion as patient will need 2 unit prbc prior to OR tomorrow)  - monitor cr   - sodium bicarb per renal   - avoid nephrotoxic meds     #acute on Chronic diastolic CHF  - torsemide  - patient does get metalozone on wednesdays   - patient to get 2 prbc prior to OR tomorrow will need 40mg of lasix after each  - LVEF 65% 12/2019    #Acute on chronic anemia   - likely secondary to underlying CKD  - Transfuse 2 units PRBC with IV lasix 40mg between transfusions  - Monitor Hb/hct    #Hypertension  - monitor blood pressure   - hydralyzine, nifedipine, and clonidine      #DVT prophylaxis  - venodynes 86 year old male with a history of CKD stage 5, anemia on chronic disease and previous GI bleed status post colonoscopy with two ulcerated polyps s/p resections and colonic AVMs, EGD with gastritis, VPCs coming to hospital with complaints of RLE pain. Per patient nitesh who is RN low flow seen on outpatient US at Dr Sorenson office. Patient stating for past 2 weeks has been increasing intermittent shooting right lower extremity pain. States worst at night. Improves when has legs hanging over edge of seat worst when legs straight. of note patient also stating having exertional dyspnea and states can not lay flat at night time. denies cp nausea vomiting or diarrhea at this time.    #RLE pain   - admit to medicine   - w/ claudication probable 2/2 PAD  - vascular consult appreciated- for angiogram & angioplasty in AM   - NPO aftermidnight     #elevated blood glucose  - check a1c  - iss  - monitor fingersticks    #troponinemia  - probable 2/2 demand  - trend    #Shortness of breath  - Likely multifactorial including anemia, CKD, AS, volume overload  - Still in mild volume overload- agree with iv lasix for now   - patient was to get ILR outpatient with Dr Gonzalez d/w Dr Johns will see patient     #CKD stage 5  - not on HD (per family request)  - nephro consult appreciated (d/w Dr Mcnulty give lasix post transfusion as patient will need 2 unit prbc prior to OR tomorrow)  - monitor cr   - sodium bicarb per renal   - avoid nephrotoxic meds     #acute on Chronic diastolic CHF  - torsemide  - patient does get metalozone on wednesdays   - patient to get 2 prbc prior to OR tomorrow will need 40mg of lasix after each  - LVEF 65% 12/2019  - cardio consult appreciated     #Acute on chronic anemia   - likely secondary to underlying CKD  - Transfuse 2 units PRBC with IV lasix 40mg between transfusions  - Monitor Hb/hct    #Hypertension  - monitor blood pressure   - hydralyzine, nifedipine, and clonidine      #DVT prophylaxis  - venodynes 86 year old male with a history of CKD stage 5, anemia on chronic disease and previous GI bleed status post colonoscopy with two ulcerated polyps s/p resections and colonic AVMs, EGD with gastritis, VPCs coming to hospital with complaints of RLE pain. Per patient nitesh who is RN low flow seen on outpatient US at Dr Sorenson office. Patient stating for past 2 weeks has been increasing intermittent shooting right lower extremity pain. States worst at night. Improves when has legs hanging over edge of seat worst when legs straight. of note patient also stating having exertional dyspnea and states can not lay flat at night time. denies cp nausea vomiting or diarrhea at this time.    #RLE pain   - admit to medicine   - w/ claudication probable 2/2 PAD  - vascular consult appreciated- for angiogram & angioplasty in AM   - NPO aftermidnight     #elevated blood glucose  - check a1c  - iss  - monitor fingersticks    #troponinemia  - probable 2/2 demand  - trend    #Shortness of breath  - Likely multifactorial including anemia, CKD, AS, volume overload  - Still in mild volume overload- agree with iv lasix for now   - patient was to get ILR outpatient with Dr Gonzalez d/w Dr Johns will see patient     #CKD stage 5  - not on HD (per family request)  - nephro consult appreciated (d/w Dr Mcnulty give lasix post transfusion as patient will need 2 unit prbc prior to OR tomorrow)  - monitor cr   - sodium bicarb per renal   - avoid nephrotoxic meds     #acute on Chronic diastolic CHF  - torsemide  - patient does get metalozone on wednesdays   - patient to get 2 prbc prior to OR tomorrow will need 40mg of lasix after each  - LVEF 65% 12/2019  - cardio consult appreciated     #Acute on chronic anemia   - likely secondary to underlying CKD  - Transfuse 2 units PRBC with IV lasix 40mg between transfusions  - Monitor Hb/hct    #Hypertension  - monitor blood pressure   - hydralyzine, nifedipine, and clonidine      #DVT prophylaxis  - venodynes  IMPROVE VTE Individual Risk Assessment  RISK                                                                Points  [  ] Previous VTE                                                  3  [  ] Thrombophilia                                               2  [  ] Lower limb paralysis                                      2        (unable to hold up >15 seconds)    [  ] Current Cancer                                              2         (within 6 months)  [  ] Immobilization > 24 hrs                                1  [  ] ICU/CCU stay > 24 hours                              1  [x  ] Age > 60                                                      1    IMPROVE VTE Score ____1_____    IMPROVE Score 0-1: Low Risk, No VTE prophylaxis required for most patients, encourage ambulation.   IMPROVE Score 2-3: At risk, pharmacologic VTE prophylaxis is indicated for most patients (in the absence of a contraindication)  IMPROVE Score > or = 4: High Risk, pharmacologic VTE prophylaxis is indicated for most patients (in the absence of a contraindication) 86 year old male with a history of CKD stage 5, anemia on chronic disease and previous GI bleed status post colonoscopy with two ulcerated polyps s/p resections and colonic AVMs, EGD with gastritis, VPCs coming to hospital with complaints of RLE pain. Per patient nitesh who is RN low flow seen on outpatient US at Dr Sorenson office. Patient stating for past 2 weeks has been increasing intermittent shooting right lower extremity pain. States worst at night. Improves when has legs hanging over edge of seat worst when legs straight. of note patient also stating having exertional dyspnea and states can not lay flat at night time. denies cp nausea vomiting or diarrhea at this time.    #RLE pain   - admit to medicine   - w/ claudication probable 2/2 PAD  - vascular consult appreciated- for angiogram & angioplasty in AM   - NPO aftermidnight     #elevated blood glucose  - check a1c  - iss  - monitor fingersticks    #troponinemia  - probable 2/2 demand  - trend    #Shortness of breath  - Likely multifactorial including anemia, CKD, AS, volume overload  - Still in mild volume overload- iv lasix    - patient was to get ILR outpatient with Dr Gonzalez d/w Dr Johns will see patient     #CKD stage 5  - not on HD (per family request)  - nephro consult appreciated (d/w Dr Mcnulty give lasix post transfusion as patient will need 2 unit prbc prior to OR tomorrow)  - monitor cr   - sodium bicarb per renal   - avoid nephrotoxic meds     #acute on Chronic diastolic CHF  - torsemide  - patient does get metalozone on wednesdays   - patient to get 2 prbc prior to OR tomorrow will need 40mg of lasix after each  - LVEF 65% 12/2019  - cardio consult appreciated     #Acute on chronic anemia   - likely secondary to underlying CKD  - Transfuse 2 units PRBC with IV lasix 40mg between transfusions  - Monitor Hb/hct    #Hypertension  - monitor blood pressure   - hydralyzine, nifedipine, and clonidine      #DVT prophylaxis  - venodynes  IMPROVE VTE Individual Risk Assessment  RISK                                                                Points  [  ] Previous VTE                                                  3  [  ] Thrombophilia                                               2  [  ] Lower limb paralysis                                      2        (unable to hold up >15 seconds)    [  ] Current Cancer                                              2         (within 6 months)  [  ] Immobilization > 24 hrs                                1  [  ] ICU/CCU stay > 24 hours                              1  [x  ] Age > 60                                                      1    IMPROVE VTE Score ____1_____    IMPROVE Score 0-1: Low Risk, No VTE prophylaxis required for most patients, encourage ambulation.   IMPROVE Score 2-3: At risk, pharmacologic VTE prophylaxis is indicated for most patients (in the absence of a contraindication)  IMPROVE Score > or = 4: High Risk, pharmacologic VTE prophylaxis is indicated for most patients (in the absence of a contraindication)

## 2020-06-08 NOTE — CONSULT NOTE ADULT - ASSESSMENT
Patient is an 87 y/o M with multiple comorbidities presenting with RLE claudication secondary to PAD, fail to improve on medical management.     -Admit to Medicine for optimization   -Plan for angiogram of RLE, possible angioplasty 6/9  -Please preop: NPO after midnight, labs including coags and type and screen

## 2020-06-09 ENCOUNTER — APPOINTMENT (OUTPATIENT)
Dept: DISASTER EMERGENCY | Facility: CLINIC | Age: 85
End: 2020-06-09

## 2020-06-09 ENCOUNTER — APPOINTMENT (OUTPATIENT)
Age: 85
End: 2020-06-09

## 2020-06-09 ENCOUNTER — APPOINTMENT (OUTPATIENT)
Dept: VASCULAR SURGERY | Facility: CLINIC | Age: 85
End: 2020-06-09

## 2020-06-09 ENCOUNTER — APPOINTMENT (OUTPATIENT)
Dept: HEMATOLOGY ONCOLOGY | Facility: CLINIC | Age: 85
End: 2020-06-09

## 2020-06-09 LAB
A1C WITH ESTIMATED AVERAGE GLUCOSE RESULT: 5.2 % — SIGNIFICANT CHANGE UP (ref 4–5.6)
ANION GAP SERPL CALC-SCNC: 18 MMOL/L — HIGH (ref 5–17)
BASOPHILS # BLD AUTO: 0.06 K/UL — SIGNIFICANT CHANGE UP (ref 0–0.2)
BASOPHILS NFR BLD AUTO: 0.8 % — SIGNIFICANT CHANGE UP (ref 0–2)
BUN SERPL-MCNC: 105 MG/DL — HIGH (ref 8–20)
CALCIUM SERPL-MCNC: 9.1 MG/DL — SIGNIFICANT CHANGE UP (ref 8.6–10.2)
CHLORIDE SERPL-SCNC: 101 MMOL/L — SIGNIFICANT CHANGE UP (ref 98–107)
CO2 SERPL-SCNC: 22 MMOL/L — SIGNIFICANT CHANGE UP (ref 22–29)
CREAT SERPL-MCNC: 5.94 MG/DL — HIGH (ref 0.5–1.3)
EOSINOPHIL # BLD AUTO: 0.18 K/UL — SIGNIFICANT CHANGE UP (ref 0–0.5)
EOSINOPHIL NFR BLD AUTO: 2.4 % — SIGNIFICANT CHANGE UP (ref 0–6)
ESTIMATED AVERAGE GLUCOSE: 103 MG/DL — SIGNIFICANT CHANGE UP (ref 68–114)
GLUCOSE BLDC GLUCOMTR-MCNC: 155 MG/DL — HIGH (ref 70–99)
GLUCOSE BLDC GLUCOMTR-MCNC: 207 MG/DL — HIGH (ref 70–99)
GLUCOSE BLDC GLUCOMTR-MCNC: 259 MG/DL — HIGH (ref 70–99)
GLUCOSE SERPL-MCNC: 133 MG/DL — HIGH (ref 70–99)
HCT VFR BLD CALC: 28 % — LOW (ref 39–50)
HGB BLD-MCNC: 8.9 G/DL — LOW (ref 13–17)
IMM GRANULOCYTES NFR BLD AUTO: 0.4 % — SIGNIFICANT CHANGE UP (ref 0–1.5)
LYMPHOCYTES # BLD AUTO: 0.7 K/UL — LOW (ref 1–3.3)
LYMPHOCYTES # BLD AUTO: 9.5 % — LOW (ref 13–44)
MCHC RBC-ENTMCNC: 29.3 PG — SIGNIFICANT CHANGE UP (ref 27–34)
MCHC RBC-ENTMCNC: 31.8 GM/DL — LOW (ref 32–36)
MCV RBC AUTO: 92.1 FL — SIGNIFICANT CHANGE UP (ref 80–100)
MONOCYTES # BLD AUTO: 0.72 K/UL — SIGNIFICANT CHANGE UP (ref 0–0.9)
MONOCYTES NFR BLD AUTO: 9.8 % — SIGNIFICANT CHANGE UP (ref 2–14)
NEUTROPHILS # BLD AUTO: 5.69 K/UL — SIGNIFICANT CHANGE UP (ref 1.8–7.4)
NEUTROPHILS NFR BLD AUTO: 77.1 % — HIGH (ref 43–77)
PLATELET # BLD AUTO: 176 K/UL — SIGNIFICANT CHANGE UP (ref 150–400)
POTASSIUM SERPL-MCNC: 3.2 MMOL/L — LOW (ref 3.5–5.3)
POTASSIUM SERPL-SCNC: 3.2 MMOL/L — LOW (ref 3.5–5.3)
RBC # BLD: 3.04 M/UL — LOW (ref 4.2–5.8)
RBC # FLD: 17.4 % — HIGH (ref 10.3–14.5)
SODIUM SERPL-SCNC: 141 MMOL/L — SIGNIFICANT CHANGE UP (ref 135–145)
WBC # BLD: 7.38 K/UL — SIGNIFICANT CHANGE UP (ref 3.8–10.5)
WBC # FLD AUTO: 7.38 K/UL — SIGNIFICANT CHANGE UP (ref 3.8–10.5)

## 2020-06-09 PROCEDURE — 37224: CPT

## 2020-06-09 PROCEDURE — 99233 SBSQ HOSP IP/OBS HIGH 50: CPT

## 2020-06-09 RX ORDER — POTASSIUM CHLORIDE 20 MEQ
10 PACKET (EA) ORAL ONCE
Refills: 0 | Status: COMPLETED | OUTPATIENT
Start: 2020-06-09 | End: 2020-06-09

## 2020-06-09 RX ORDER — POTASSIUM CHLORIDE 20 MEQ
10 PACKET (EA) ORAL
Refills: 0 | Status: COMPLETED | OUTPATIENT
Start: 2020-06-09 | End: 2020-06-09

## 2020-06-09 RX ADMIN — Medication 90 MILLIGRAM(S): at 05:50

## 2020-06-09 RX ADMIN — Medication 60 MILLIGRAM(S): at 21:52

## 2020-06-09 RX ADMIN — Medication 3 MILLILITER(S): at 20:34

## 2020-06-09 RX ADMIN — Medication 81 MILLIGRAM(S): at 07:36

## 2020-06-09 RX ADMIN — Medication 20 MILLIGRAM(S): at 12:20

## 2020-06-09 RX ADMIN — ATORVASTATIN CALCIUM 80 MILLIGRAM(S): 80 TABLET, FILM COATED ORAL at 21:52

## 2020-06-09 RX ADMIN — Medication 0.1 MILLIGRAM(S): at 21:52

## 2020-06-09 RX ADMIN — Medication 100 MILLIEQUIVALENT(S): at 14:30

## 2020-06-09 RX ADMIN — Medication 325 MILLIGRAM(S): at 21:52

## 2020-06-09 RX ADMIN — Medication 250 MILLIGRAM(S): at 12:21

## 2020-06-09 RX ADMIN — Medication 50 MILLIGRAM(S): at 12:19

## 2020-06-09 RX ADMIN — Medication 100 MILLIEQUIVALENT(S): at 16:35

## 2020-06-09 RX ADMIN — Medication 50 MILLIGRAM(S): at 21:52

## 2020-06-09 RX ADMIN — Medication 1 TABLET(S): at 12:19

## 2020-06-09 RX ADMIN — Medication 325 MILLIGRAM(S): at 12:19

## 2020-06-09 RX ADMIN — Medication 100 MILLIEQUIVALENT(S): at 13:26

## 2020-06-09 RX ADMIN — Medication 1300 MILLIGRAM(S): at 16:34

## 2020-06-09 RX ADMIN — Medication 20 MILLIGRAM(S): at 21:52

## 2020-06-09 RX ADMIN — Medication 400 MILLIGRAM(S): at 11:04

## 2020-06-09 RX ADMIN — CALCITRIOL 0.25 MICROGRAM(S): 0.5 CAPSULE ORAL at 12:19

## 2020-06-09 RX ADMIN — Medication 3 MILLILITER(S): at 15:50

## 2020-06-09 RX ADMIN — Medication 50 MILLIGRAM(S): at 05:50

## 2020-06-09 RX ADMIN — CILOSTAZOL 100 MILLIGRAM(S): 100 TABLET ORAL at 05:50

## 2020-06-09 RX ADMIN — CILOSTAZOL 100 MILLIGRAM(S): 100 TABLET ORAL at 16:34

## 2020-06-09 RX ADMIN — Medication 1300 MILLIGRAM(S): at 05:50

## 2020-06-09 RX ADMIN — ISOSORBIDE MONONITRATE 30 MILLIGRAM(S): 60 TABLET, EXTENDED RELEASE ORAL at 21:53

## 2020-06-09 RX ADMIN — Medication 325 MILLIGRAM(S): at 05:50

## 2020-06-09 NOTE — PROGRESS NOTE ADULT - ASSESSMENT
·  PRE-OP DIAGNOSIS:  Claudication 09-Jun-2020   ·  POST-OP DIAGNOSIS:  Claudication 09-Jun-2020   ·  PROCEDURES:  Fluoroscopic angioplasty of superficial femoral artery 09-Jun-2020 10:57:19 Balloon angioplasty of distal SFA ,        · Operative Findings	L femoral access.   RLE angiography and balloon angioplasty of distal SFA.   L groin access closed with Mynx device.	      Loop Recorder - Per Cardiology,

## 2020-06-09 NOTE — PROGRESS NOTE ADULT - ASSESSMENT
Patient is an 85 y/o M with multiple comorbidities presenting with RLE claudication secondary to PAD, fail to improve on medical management.   Pt with chronic anemia treated with 2 units PRBCS with SOB during first unit requiring o2 and IV diuretics . Pt presents today for peripheral angiogram   currently comfortable off o2 with o2 sat 97 %     PRE-PROCEDURE ASSESSMENT  Peripheral  -Patient seen and examined  -Labs and EKG reviewed   -Pre-procedure teaching completed with patient and family  -  Informed consent obtained   -Questions answered  - Pt received Asprin prior to cardiac cath pt allergic to Plavix   Risks & benefits of procedure and sedation and risks and benefits for the alternative therapy have been explained to the patient in layman’s terms including but not limited to: allergic reaction, bleeding, infection, arrhythmia, respiratory compromise, renal and vascular compromise, limb damage, MI, CVA, emergent CABG/Vascular Surgery and death. Informed consent obtained and in chart.

## 2020-06-09 NOTE — PROGRESS NOTE ADULT - SUBJECTIVE AND OBJECTIVE BOX
Interventional Cardiology NP Precath Note    Pt presents today for RLE angiogram due to leg pain and claudication     Mallampati 2  ASA 2  BRA 9.7  GFR 9         MEDS:   acetaminophen  IVPB .. 1000 milliGRAM(s) IV Intermittent once PRN  albuterol/ipratropium for Nebulization 3 milliLiter(s) Nebulizer every 6 hours  ascorbic acid 250 milliGRAM(s) Oral daily  aspirin enteric coated 81 milliGRAM(s) Oral daily  atorvastatin 80 milliGRAM(s) Oral at bedtime  calcitriol   Capsule 0.25 MICROGram(s) Oral daily  cilostazol 100 milliGRAM(s) Oral two times a day  cloNIDine 0.1 milliGRAM(s) Oral at bedtime  ferrous    sulfate 325 milliGRAM(s) Oral three times a day  glucagon  Injectable 1 milliGRAM(s) IntraMuscular once PRN  hydrALAZINE 50 milliGRAM(s) Oral three times a day  insulin lispro (HumaLOG) corrective regimen sliding scale   SubCutaneous three times a day before meals  insulin lispro (HumaLOG) corrective regimen sliding scale   SubCutaneous at bedtime  isosorbide   mononitrate ER Tablet (IMDUR) 30 milliGRAM(s) Oral <User Schedule>  Nephro-pito 1 Tablet(s) Oral daily  NIFEdipine XL 90 milliGRAM(s) Oral daily  NIFEdipine XL 60 milliGRAM(s) Oral at bedtime  pantoprazole    Tablet 40 milliGRAM(s) Oral <User Schedule> PRN  sodium bicarbonate 1300 milliGRAM(s) Oral every 12 hours  torsemide 20 milliGRAM(s) Oral two times a day    Vitals   T(C): 36.9 (06-09-20 @ 07:33), Max: 36.9 (06-08-20 @ 09:15)  HR: 89 (06-09-20 @ 07:33) (83 - 104)  BP: 169/81 (06-09-20 @ 07:33) (115/59 - 188/79)  RR: 18 (06-09-20 @ 07:33) (16 - 20)  SpO2: 100% (06-09-20 @ 07:33) (92% - 100%)    Exam   NEURO: A & O x 3, no focal neurologic deficits  HEENT: NCAT, EOMI, PERRLA  NECK: No JVD, No carotid bruits B/L, +2 Carotid pulses B/L  PULM:  decreased bilateral  B/L No W/R/R  CARD: RRR, +S1, +S2, 3/6   ABD: ND, +BS, NT, no masses  EXT: doppler pulses bilateral     TTE Echo Complete w/Doppler (12.18.19 @ 20:19) >   1. Left ventricular ejection fraction, by visual estimation, is 65 to 70%.   2. Normal global left ventricular systolic function.   3. LV Ejection Fraction by Reynoso's Method with a biplane EF of 71 %.   4. Mildly increased LV wall thickness.   5. Normal left ventricular internal cavity size.   6. Spectral Doppler shows impaired relaxation pattern of left ventricular myocardial filling (Grade I diastolicdysfunction).   7. There is mild concentric left ventricular hypertrophy.   8. Moderately enlarged left atrium.   9. Degenerative mitral valve.  10. Mild to moderate mitral valve regurgitation.  11. Moderate mitral annular calcification.  12. Moderate thickening and calcification of the anterior and posterior mitral valve leaflets.  13. Mild to moderate aortic valve stenosis.  14. Estimated pulmonary artery systolic pressure is 54.4 mmHg assuming a right atrial pressure of 15 mmHg, which is consistent with moderate pulmonary hypertension.  15. Mitral valve mean gradient is 7.0 mmHg consistent with moderate mitral stenosis.  16. The aortic valve mean gradient is 15.5 mmHg consistent with mild aortic stenosis.                              8.9    7.38  )-----------( 176      ( 09 Jun 2020 06:26 )             28.0     06-09    141  |  101  |  105.0<H>  ----------------------------<  133<H>  3.2<L>   |  22.0  |  5.94<H>    Ca    9.1      09 Jun 2020 06:26    TPro  6.9  /  Alb  3.9  /  TBili  0.3<L>  /  DBili  x   /  AST  59<H>  /  ALT  46<H>  /  AlkPhos  93  06-08    CARDIAC MARKERS ( 08 Jun 2020 12:42 )  x     / 0.09 ng/mL / x     / x     / x      CARDIAC MARKERS ( 08 Jun 2020 09:57 )  x     / 0.09 ng/mL / x     / x     / x

## 2020-06-09 NOTE — PROGRESS NOTE ADULT - SUBJECTIVE AND OBJECTIVE BOX
Patient is a 86y old  Male who presents with a chief complaint of RLE Pain (2020 07:53)    Patient seen and examined at bedside.     ALLERGIES:  Plavix (Hives)  Toprol-XL (Rash)    MEDICATIONS  (STANDING):  albuterol/ipratropium for Nebulization 3 milliLiter(s) Nebulizer every 6 hours  ascorbic acid 250 milliGRAM(s) Oral daily  aspirin enteric coated 81 milliGRAM(s) Oral daily  atorvastatin 80 milliGRAM(s) Oral at bedtime  calcitriol   Capsule 0.25 MICROGram(s) Oral daily  cilostazol 100 milliGRAM(s) Oral two times a day  cloNIDine 0.1 milliGRAM(s) Oral at bedtime  ferrous    sulfate 325 milliGRAM(s) Oral three times a day  hydrALAZINE 50 milliGRAM(s) Oral three times a day  isosorbide   mononitrate ER Tablet (IMDUR) 30 milliGRAM(s) Oral <User Schedule>  Nephro-pito 1 Tablet(s) Oral daily  NIFEdipine XL 90 milliGRAM(s) Oral daily  NIFEdipine XL 60 milliGRAM(s) Oral at bedtime  potassium chloride  10 mEq/100 mL IVPB 10 milliEquivalent(s) IV Intermittent every 1 hour  sodium bicarbonate 1300 milliGRAM(s) Oral every 12 hours  torsemide 20 milliGRAM(s) Oral two times a day    MEDICATIONS  (PRN):  acetaminophen  IVPB .. 1000 milliGRAM(s) IV Intermittent once PRN Mild Pain (1 - 3)  pantoprazole    Tablet 40 milliGRAM(s) Oral <User Schedule> PRN gerd    Vital Signs Last 24 Hrs  T(F): 98.4 (2020 07:33), Max: 98.4 (2020 07:33)  HR: 98 (2020 10:40) (83 - 102)  BP: 164/70 (2020 10:40) (115/59 - 188/79)  RR: 16 (2020 10:40) (16 - 19)  SpO2: 98% (2020 10:40) (92% - 100%)  I&O's Summary    2020 07:01  -  2020 07:00  --------------------------------------------------------  IN: 0 mL / OUT: 500 mL / NET: -500 mL      PHYSICAL EXAM:  General: NAD, alert  ENT: MMM, no thrush  Neck: Supple, No JVD  Lungs: Clear to auscultation bilaterally, good air entry, non-labored breathing  Cardio: +s1/s2 +edema  Abdomen: Soft, Nontender, Nondistended; Bowel sounds present  Extremities: No calf tenderness    LABS:                        8.9    7.38  )-----------( 176      ( 2020 06:26 )             28.0         141  |  101  |  105.0  ----------------------------<  133  3.2   |  22.0  |  5.94    Ca    9.1      2020 06:26    TPro  6.9  /  Alb  3.9  /  TBili  0.3  /  DBili  x   /  AST  59  /  ALT  46  /  AlkPhos  93  -08    eGFR if Non African American: 8 mL/min/1.73M2 (20 @ 06:26)  eGFR if : 9 mL/min/1.73M2 (20 @ 06:26)    PT/INR - ( 2020 09:57 )   PT: 11.2 sec;   INR: 0.99 ratio      PTT - ( 2020 09:57 )  PTT:22.1 sec    CARDIAC MARKERS ( 2020 12:42 )  x     / 0.09 ng/mL / x     / x     / x      CARDIAC MARKERS ( 2020 09:57 )  x     / 0.09 ng/mL / x     / x     / x        Glucose  POCT Blood Glucose.: 176 mg/dL (2020 21:13)  POCT Blood Glucose.: 246 mg/dL (2020 17:22)    Urinalysis Basic - ( 2020 13:12 )  Color: Yellow / Appearance: Slightly Turbid / S.010 / pH: x  Gluc: x / Ketone: Negative  / Bili: Negative / Urobili: Negative mg/dL   Blood: x / Protein: 100 mg/dL / Nitrite: Negative   Leuk Esterase: Negative / RBC: 0-2 /HPF / WBC 0-2   Sq Epi: x / Non Sq Epi: Occasional / Bacteria: Few    RADIOLOGY & ADDITIONAL TESTS:  < from: Xray Chest 2 Views PA/Lat (20 @ 10:17) >  IMPRESSION:  Congestive changes as noted.  < end of copied text >    Care Discussed with Consultants/Other Providers:   vascular Patient is a 86y old  Male who presents with a chief complaint of RLE Pain (10 Rolly 2020 08:38)      Patient seen and examined at bedside.     ALLERGIES:  Plavix (Hives)  Toprol-XL (Rash)    MEDICATIONS  (STANDING):  albuterol/ipratropium for Nebulization 3 milliLiter(s) Nebulizer every 6 hours  ascorbic acid 250 milliGRAM(s) Oral daily  aspirin enteric coated 81 milliGRAM(s) Oral daily  atorvastatin 80 milliGRAM(s) Oral at bedtime  calcitriol   Capsule 0.25 MICROGram(s) Oral daily  cilostazol 100 milliGRAM(s) Oral two times a day  cloNIDine 0.1 milliGRAM(s) Oral at bedtime  darbepoetin Injectable Syringe 100 MICROGram(s) SubCutaneous once  ferrous    sulfate 325 milliGRAM(s) Oral three times a day  hydrALAZINE 50 milliGRAM(s) Oral three times a day  isosorbide   mononitrate ER Tablet (IMDUR) 30 milliGRAM(s) Oral <User Schedule>  Nephro-pito 1 Tablet(s) Oral daily  NIFEdipine XL 90 milliGRAM(s) Oral daily  NIFEdipine XL 60 milliGRAM(s) Oral at bedtime  sodium bicarbonate 1300 milliGRAM(s) Oral every 12 hours  torsemide 20 milliGRAM(s) Oral two times a day    MEDICATIONS  (PRN):  pantoprazole    Tablet 40 milliGRAM(s) Oral <User Schedule> PRN gerd    Vital Signs Last 24 Hrs  T(F): 98.3 (10 Rolly 2020 08:24), Max: 98.3 (10 Rolly 2020 05:21)  HR: 91 (10 Rolly 2020 08:24) (85 - 114)  BP: 134/64 (10 Rolly 2020 08:24) (134/64 - 172/71)  RR: 20 (10 Rolly 2020 08:24) (16 - 20)  SpO2: 97% (10 Rolly 2020 08:24) (95% - 98%)  I&O's Summary    2020 07:01  -  10 Rolly 2020 07:00  --------------------------------------------------------  IN: 120 mL / OUT: 200 mL / NET: -80 mL    PHYSICAL EXAM:  General: NAD, alert  ENT: MMM, no thrush  Neck: Supple, No JVD  Lungs: Clear to auscultation bilaterally, good air entry, non-labored breathing  Cardio: +s1/s2 +edema  Abdomen: Soft, Nontender, Nondistended; Bowel sounds present  Extremities: No calf tenderness      LABS:                        9.6    8.22  )-----------( 194      ( 10 Rolly 2020 06:29 )             30.5     06-10    138  |  99  |  109.0  ----------------------------<  150  3.5   |  20.0  |  5.95    Ca    8.8      10 Rolly 2020 06:29  Phos  5.6     06-10  Mg     2.3     10    TPro  6.9  /  Alb  3.9  /  TBili  0.3  /  DBili  x   /  AST  59  /  ALT  46  /  AlkPhos  93  06-08        eGFR if Non African American: 8 mL/min/1.73M2 (06-10-20 @ 06:29)  eGFR if African American: 9 mL/min/1.73M2 (06-10-20 @ 06:29)    PT/INR - ( 2020 09:57 )   PT: 11.2 sec;   INR: 0.99 ratio    PTT - ( 2020 09:57 )  PTT:22.1 sec    CARDIAC MARKERS ( 2020 12:42 )  x     / 0.09 ng/mL / x     / x     / x      CARDIAC MARKERS ( 2020 09:57 )  x     / 0.09 ng/mL / x     / x     / x        Glucose  POCT Blood Glucose.: 259 mg/dL (2020 21:17)  POCT Blood Glucose.: 207 mg/dL (2020 17:20)  POCT Blood Glucose.: 155 mg/dL (2020 12:08)    Urinalysis Basic - ( 2020 13:12 )  Color: Yellow / Appearance: Slightly Turbid / S.010 / pH: x  Gluc: x / Ketone: Negative  / Bili: Negative / Urobili: Negative mg/dL   Blood: x / Protein: 100 mg/dL / Nitrite: Negative   Leuk Esterase: Negative / RBC: 0-2 /HPF / WBC 0-2   Sq Epi: x / Non Sq Epi: Occasional / Bacteria: Few    RADIOLOGY & ADDITIONAL TESTS:  < from: Xray Chest 2 Views PA/Lat (20 @ 10:17) >  IMPRESSION:  Congestive changes as noted.  < end of copied text >    Care Discussed with Consultants/Other Providers:   Nephrology Patient is a 86y old  Male who presents with a chief complaint of RLE Pain (2020 07:53)    Patient seen and examined at bedside.     ALLERGIES:  Plavix (Hives)  Toprol-XL (Rash)    MEDICATIONS  (STANDING):  albuterol/ipratropium for Nebulization 3 milliLiter(s) Nebulizer every 6 hours  ascorbic acid 250 milliGRAM(s) Oral daily  aspirin enteric coated 81 milliGRAM(s) Oral daily  atorvastatin 80 milliGRAM(s) Oral at bedtime  calcitriol   Capsule 0.25 MICROGram(s) Oral daily  cilostazol 100 milliGRAM(s) Oral two times a day  cloNIDine 0.1 milliGRAM(s) Oral at bedtime  ferrous    sulfate 325 milliGRAM(s) Oral three times a day  hydrALAZINE 50 milliGRAM(s) Oral three times a day  isosorbide   mononitrate ER Tablet (IMDUR) 30 milliGRAM(s) Oral <User Schedule>  Nephro-pito 1 Tablet(s) Oral daily  NIFEdipine XL 90 milliGRAM(s) Oral daily  NIFEdipine XL 60 milliGRAM(s) Oral at bedtime  potassium chloride  10 mEq/100 mL IVPB 10 milliEquivalent(s) IV Intermittent every 1 hour  sodium bicarbonate 1300 milliGRAM(s) Oral every 12 hours  torsemide 20 milliGRAM(s) Oral two times a day    MEDICATIONS  (PRN):  acetaminophen  IVPB .. 1000 milliGRAM(s) IV Intermittent once PRN Mild Pain (1 - 3)  pantoprazole    Tablet 40 milliGRAM(s) Oral <User Schedule> PRN gerd    Vital Signs Last 24 Hrs  T(F): 98.4 (2020 07:33), Max: 98.4 (2020 07:33)  HR: 98 (2020 10:40) (83 - 102)  BP: 164/70 (2020 10:40) (115/59 - 188/79)  RR: 16 (2020 10:40) (16 - 19)  SpO2: 98% (2020 10:40) (92% - 100%)  I&O's Summary    2020 07:01  -  2020 07:00  --------------------------------------------------------  IN: 0 mL / OUT: 500 mL / NET: -500 mL      PHYSICAL EXAM:  General: NAD, alert  ENT: MMM, no thrush  Neck: Supple, No JVD  Lungs: Clear to auscultation bilaterally, good air entry, non-labored breathing  Cardio: +s1/s2 +edema  Abdomen: Soft, Nontender, Nondistended; Bowel sounds present  Extremities: No calf tenderness    LABS:                        8.9    7.38  )-----------( 176      ( 2020 06:26 )             28.0         141  |  101  |  105.0  ----------------------------<  133  3.2   |  22.0  |  5.94    Ca    9.1      2020 06:26    TPro  6.9  /  Alb  3.9  /  TBili  0.3  /  DBili  x   /  AST  59  /  ALT  46  /  AlkPhos  93  -08    eGFR if Non African American: 8 mL/min/1.73M2 (20 @ 06:26)  eGFR if : 9 mL/min/1.73M2 (20 @ 06:26)    PT/INR - ( 2020 09:57 )   PT: 11.2 sec;   INR: 0.99 ratio      PTT - ( 2020 09:57 )  PTT:22.1 sec    CARDIAC MARKERS ( 2020 12:42 )  x     / 0.09 ng/mL / x     / x     / x      CARDIAC MARKERS ( 2020 09:57 )  x     / 0.09 ng/mL / x     / x     / x        Glucose  POCT Blood Glucose.: 176 mg/dL (2020 21:13)  POCT Blood Glucose.: 246 mg/dL (2020 17:22)    Urinalysis Basic - ( 2020 13:12 )  Color: Yellow / Appearance: Slightly Turbid / S.010 / pH: x  Gluc: x / Ketone: Negative  / Bili: Negative / Urobili: Negative mg/dL   Blood: x / Protein: 100 mg/dL / Nitrite: Negative   Leuk Esterase: Negative / RBC: 0-2 /HPF / WBC 0-2   Sq Epi: x / Non Sq Epi: Occasional / Bacteria: Few    RADIOLOGY & ADDITIONAL TESTS:  < from: Xray Chest 2 Views PA/Lat (20 @ 10:17) >  IMPRESSION:  Congestive changes as noted.  < end of copied text >    Care Discussed with Consultants/Other Providers:

## 2020-06-09 NOTE — PROGRESS NOTE ADULT - SUBJECTIVE AND OBJECTIVE BOX
HPI:    86y Male with past medical history as under presenting with 3wk hx of b/l LE pain, more significant on his RLE. Patient reports shooting pain from the calf to the ankle, upon exertion and at rest. Patient was started on Cilostazol on last vascular f/u, without improvement of symptoms.  Pt was seen by cardiologist Dr. Peterson last week for worsening dyspnea and PND and his torsemide was increased to 20 mg bid without improvement. Pt is known to our service; he has followed with Dr. Avendano and myself for CKD V. He had a LUE AVF creation which has never been used. Pt seen and examined in ED; feels well. Denies any acute complaint at this time.    PAST HISTORY  --------------------------------------------------------------------------------  PAST MEDICAL & SURGICAL HISTORY:    Anemia  VT (ventricular tachycardia)  HTN (hypertension)  CAD (coronary artery disease)  CKD (chronic kidney disease): stage IV  DM (diabetes mellitus)    H/O carotid endarterectomy: Right  A-V fistula: left arm 2017  H/O angioplasty: ,  no  intervention  H/O left knee surgery  H/O circumcision: at  age  65    FAMILY HISTORY:  Family history of premature CAD  Family history of lung cancer (Grandparent)  Family history of cancer (Grandparent)    PAST SOCIAL HISTORY: lives at home    ALLERGIES & MEDICATIONS  --------------------------------------------------------------------------------  Allergies    Plavix (Hives)  Toprol-XL (Rash)    Standing Inpatient Medications  furosemide   Injectable 40 milliGRAM(s) IV Push Once    REVIEW OF SYSTEMS  --------------------------------------------------------------------------------  Gen: No weight changes, fatigue, fevers/chills, weakness  Skin: No rashes  Head/Eyes/Ears/Mouth: No headache; Normal hearing; Normal vision w/o blurriness; No sinus pain/discomfort, sore throat  Respiratory: No dyspnea, cough, wheezing, hemoptysis  CV: No chest pain, PND, orthopnea  GI: No abdominal pain, diarrhea, constipation, nausea, vomiting, melena, hematochezia  : No increased frequency, dysuria, hematuria, nocturia  MSK: No joint pain/swelling; no back pain; no edema  Neuro: No dizziness/lightheadedness, weakness, seizures, numbness, tingling  Heme: No easy bruising or bleeding  Endo: No heat/cold intolerance  Psych: No significant nervousness, anxiety, stress, depression    All other systems were reviewed and are negative, except as noted.    VITALS/PHYSICAL EXAM  --------------------------------------------------------------------------------  Vital Signs Last 24 Hrs,    T(C): 36.9 (2020 07:33), Max: 36.9 (2020 07:33)  T(F): 98.4 (2020 07:33), Max: 98.4 (2020 07:33)  HR: 95 (2020 11:10) (83 - 102)  BP: 152/67 (2020 11:10) (115/59 - 188/79)  RR: 16 (2020 11:10) (16 - 19)  SpO2: 98% (2020 11:10) (92% - 100%)    Physical Exam:  	Gen: NAD, Elderly, Pale, Sallow,   	HEENT: PERRL, supple neck,   	Pulm: b/l faint crackles  	CV: RRR, S1S2;, DEANA+  	Back: No spinal or CVA tenderness;  	Abd: +BS, soft, nontender/nondistended  	: No suprapubic tenderness  	UE: Warm, no edema; no asterixis  	LE: Warm, no edema  	Neuro: No focal deficits,   	Psych: Normal affect and mood  	Skin: Warm, pallor,   	Vascular access: YOVANI AVF thrill/ bruit+    LABS/STUDIES  --------------------------------------------------------------------------------    141    |  101    |  105.0<H>  ----------------------------<  133<H>  Ca:9.1   (2020 06:26)  3.2<L>   |  22.0   |  5.94<H>    eGFR if Non : 8 <L>    TPro  6.9    /  Alb  3.9    /  TBili  0.3<L>  /  DBili  x      /  AST  59<H>  /  ALT  46<H>  /  AlkPhos  93     2020 09:57                        8.9<L>  7.38  )-----------( 176      ( 2020 06:26 )             28.0<L>    Urinalysis Basic - ( 2020 13:12 )  Color: Yellow / Appearance: Slightly Turbid<!> / S.010 / pH: x  Gluc: x / Ketone: Negative  / Bili: Negative / Urobili: Negative mg/dL   Blood: x / Protein: 100 mg/dL<!> / Nitrite: Negative   Leuk Esterase: Negative / RBC: 0-2 /HPF / WBC 0-2   Sq Epi: x / Non Sq Epi: Occasional / Bacteria: Few<!>                8.0    8.76  >-----------<  252      [20 @ 09:57]              25.8     134  |  93  |  104.0  ----------------------------<  342      [20 @ 09:57]  4.9   |  20.0  |  5.94        Ca     9.3     [20 @ 09:57]    TPro  6.9  /  Alb  3.9  /  TBili  0.3  /  DBili  x   /  AST  59  /  ALT  46  /  AlkPhos  93  [20 @ 09:57]    PT/INR: PT 11.2 , INR 0.99       [20 09:57]  PTT: 22.1       [20 09:57]    Troponin 0.09      [20 09:57]    Creatinine Trend:  SCr 5.94 [ 09:57]    Urinalysis - [20 @ 16:53]      Color Yellow / Appearance Clear / SG 1.010 / pH 6.0      Gluc 100 / Ketone Negative  / Bili Negative / Urobili Negative       Blood Trace / Protein 100 / Leuk Est Negative / Nitrite Negative      RBC 0-2 / WBC 0-2 / Hyaline  / Gran  / Sq Epi  / Non Sq Epi Occasional / Bacteria Occasional      Iron 79, TIBC 289, %sat 27      [20 @ 21:56]  Ferritin 248      [20 @ 21:56]  HbA1c 4.9      [18 @ 05:54]    HBsAb <3.0      [18 @ 18:29]  HBsAg Nonreact      [18 @ 18:29]  HBcAb Nonreact      [18 @ 18:29]  HCV 0.09, Nonreact      [18 @ 18:29]    Pt with CKD stage V, admitted for anemia    1)CKD stage V- not on dialysis. BUN/Cr elevated. Family has been refusing HD in the past.   2) Acute on chronic anemia- Plan for PRBC transfusion. Gets Aranesp injections at Dr. Baker's office. Monitor H/H  3) Acute on chronic CHFpEF; agree with IV lasix post transfusion. Continue Torsemide 20 mg bid  4) PAD - Evaluated by vascular surgery. Plan for angiogram of RLE and possible angioplasty tomorrow  5) metabolic acidosis; po sodium bicarb 1300 mg bid    monitor BUN/Cr , lytes and UOP

## 2020-06-09 NOTE — BRIEF OPERATIVE NOTE - OPERATION/FINDINGS
L femoral access. RLE angiography and balloon angioplasty of distal SFA. L groin access closed with Mynx device.

## 2020-06-09 NOTE — PROGRESS NOTE ADULT - ASSESSMENT
86 year old male with a history of CKD stage 5, anemia on chronic disease and previous GI bleed status post colonoscopy with two ulcerated polyps s/p resections and colonic AVMs, EGD with gastritis, VPCs coming to hospital with complaints of RLE pain.     #RLE pain   - w/ claudication probable 2/2 PAD  - vascular consult appreciated- for angiogram & angioplasty    #elevated blood glucose  - a1c 5.2 on admit - iss d/c     #troponinemia  - probable 2/2 demand  - trend  - cardio consult appreciated     #Shortness of breath  - Likely multifactorial including anemia, CKD, AS, volume overload  - patient was to get ILR outpatient with Dr Gonzalez d/w Dr Johns will see patient     #CKD stage 5  - not on HD (per family request)  - nephro consult appreciated (d/w Dr Mcnulty give lasix post transfusion as patient will need 2 unit prbc prior to OR tomorrow)  - monitor cr   - sodium bicarb per renal   - avoid nephrotoxic meds     #acute on Chronic diastolic CHF  - torsemide  - patient does get metalozone on wednesdays)   - LVEF 65% 12/2019  - cardio consult appreciated     #Acute on chronic anemia   - likely secondary to underlying CKD  - s/p 2 units prbc  - Monitor Hb/hct    #Hypertension  - monitor blood pressure   - imdur, hydralyzine, nifedipine, and clonidine      #DVT prophylaxis  - venodynes 86 year old male with a history of CKD stage 5, anemia on chronic disease and previous GI bleed status post colonoscopy with two ulcerated polyps s/p resections and colonic AVMs, EGD with gastritis, VPCs coming to hospital with complaints of RLE pain.     #RLE pain   - w/ claudication probable 2/2 PAD  - vascular consult appreciated- for angiogram & angioplasty    #elevated blood glucose  - a1c 5.2 on admit - iss d/c     #troponinemia  - probable 2/2 demand  - trend  - cardio consult appreciated     #Shortness of breath  - Likely multifactorial including anemia, CKD, AS, volume overload  - patient was to get ILR outpatient with Dr Palma - to happen prior to d/c    #CKD stage 5  - not on HD (per family request)  - nephro consult appreciated (d/w Dr Mcnulty give lasix post transfusion as patient will need 2 unit prbc prior to OR tomorrow)  - monitor cr   - sodium bicarb per renal   - avoid nephrotoxic meds     #acute on Chronic diastolic CHF  - torsemide  - patient does get metalozone on wednesdays)   - LVEF 65% 12/2019  - cardio consult appreciated     #Acute on chronic anemia   - likely secondary to underlying CKD  - s/p 2 units prbc  - Monitor Hb/hct    #Hypertension  - monitor blood pressure   - imdur, hydralyzine, nifedipine, and clonidine      #DVT prophylaxis  - venodynes 86 year old male with a history of CKD stage 5, anemia on chronic disease and previous GI bleed status post colonoscopy with two ulcerated polyps s/p resections and colonic AVMs, EGD with gastritis, VPCs coming to hospital with complaints of RLE pain.     #RLE pain   - w/ claudication probable 2/2 PAD  - vascular consult appreciated- s/p angiogram & angioplasty    #elevated blood glucose  - a1c 5.2 on admit - iss d/c     #troponinemia  - probable 2/2 demand  - trend  - cardio consult appreciated     #Shortness of breath  - Likely multifactorial including anemia, CKD, AS, volume overload  - patient was to get ILR outpatient with Dr Palma - to happen prior to d/c    #CKD stage 5  - not on HD (per family request)  - nephro consult appreciated  - monitor cr   - sodium bicarb per renal   - avoid nephrotoxic meds     #acute on Chronic diastolic CHF  - torsemide  - patient does get metalozone on wednesdays)   - LVEF 65% 12/2019  - cardio consult appreciated     #Acute on chronic anemia   - likely secondary to underlying CKD  - s/p 2 units prbc  - Monitor Hb/hct  - aranesp    #Hypertension  - monitor blood pressure   - imdur, hydralyzine, nifedipine, and clonidine      #DVT prophylaxis  - venodynes 86 year old male with a history of CKD stage 5, anemia on chronic disease and previous GI bleed status post colonoscopy with two ulcerated polyps s/p resections and colonic AVMs, EGD with gastritis, VPCs coming to hospital with complaints of RLE pain.     #RLE pain   - w/ claudication probable 2/2 PAD  - vascular consult appreciated- for angiogram & angioplasty via vascular    #elevated blood glucose  - a1c 5.2 on admit - iss d/c     #troponinemia  - probable 2/2 demand  - trend  - cardio consult appreciated     #Shortness of breath  - Likely multifactorial including anemia, CKD, AS, volume overload  - patient was to get ILR outpatient with Dr Palma - to happen prior to d/c    #CKD stage 5  - not on HD (per family request)  - nephro consult appreciated  - monitor cr   - sodium bicarb per renal   - avoid nephrotoxic meds     #acute on Chronic diastolic CHF  - torsemide  - patient does get metalozone on wednesdays)   - LVEF 65% 12/2019  - cardio consult appreciated     #Acute on chronic anemia   - likely secondary to underlying CKD  - s/p 2 units prbc  - Monitor Hb/hct    #Hypertension  - monitor blood pressure   - imdur, hydralyzine, nifedipine, and clonidine      #DVT prophylaxis  - venodynes

## 2020-06-09 NOTE — CHART NOTE - NSCHARTNOTEFT_GEN_A_CORE
POST-PROCEDURE NOTE    Subjective:  Patient is s/p RLE angiogram with balloon angioplasty of distal SFA using CO2 contrast. Patient tolerated procedure well with monophasic DP and PT of RLE and dopplerable PT and DP in LLE. L femoral access closed with mynx device without complication and patient remained on bedrest for 4 hours post procedure. Patient reports pain of RLE is well controlled. denies f/c/sob/n/v.    Vital Signs Last 24 Hrs  T(C): 36.8 (09 Jun 2020 12:14), Max: 36.9 (09 Jun 2020 07:33)  T(F): 98.2 (09 Jun 2020 12:14), Max: 98.4 (09 Jun 2020 07:33)  HR: 99 (09 Jun 2020 12:14) (83 - 101)  BP: 146/67 (09 Jun 2020 12:14) (115/59 - 188/79)  BP(mean): --  RR: 18 (09 Jun 2020 12:14) (16 - 18)  SpO2: 97% (09 Jun 2020 12:14) (95% - 100%)  I&O's Detail    08 Jun 2020 07:01  -  09 Jun 2020 07:00  --------------------------------------------------------  IN:  Total IN: 0 mL    OUT:    Voided: 500 mL  Total OUT: 500 mL    Total NET: -500 mL      09 Jun 2020 07:01  -  09 Jun 2020 15:40  --------------------------------------------------------  IN:    Oral Fluid: 120 mL  Total IN: 120 mL    OUT:  Total OUT: 0 mL    Total NET: 120 mL        aspirin enteric coated 81  cilostazol 100  cloNIDine 0.1  hydrALAZINE 50  isosorbide   mononitrate ER Tablet (IMDUR) 30  NIFEdipine XL 90  NIFEdipine XL 60  torsemide 20    PAST MEDICAL & SURGICAL HISTORY:  Risk factors for obstructive sleep apnea  Anemia  RICHARDS (dyspnea on exertion)  VT (ventricular tachycardia)  HTN (hypertension)  CAD (coronary artery disease)  CKD (chronic kidney disease): stage IV  Arrhythmia  AV block, 1st degree  DM (diabetes mellitus)  H/O carotid endarterectomy: Right  A-V fistula: left arm 5/2017  H/O angioplasty: 2013,  no  intervention  H/O left knee surgery  H/O circumcision: at  age  65        Physical Exam:  General: NAD, resting comfortably in bed  Pulmonary: Nonlabored breathing, no respiratory distress  Cardiovascular: NSR  Abdominal: soft, NT/ND  Extremities: L groin PCI incision dressing is c/d/i. RLE WWP. Doppler unavailable for exam - will return later      LABS:                        8.9    7.38  )-----------( 176      ( 09 Jun 2020 06:26 )             28.0     06-09    141  |  101  |  105.0<H>  ----------------------------<  133<H>  3.2<L>   |  22.0  |  5.94<H>    Ca    9.1      09 Jun 2020 06:26    TPro  6.9  /  Alb  3.9  /  TBili  0.3<L>  /  DBili  x   /  AST  59<H>  /  ALT  46<H>  /  AlkPhos  93  06-08    PT/INR - ( 08 Jun 2020 09:57 )   PT: 11.2 sec;   INR: 0.99 ratio         PTT - ( 08 Jun 2020 09:57 )  PTT:22.1 sec  CAPILLARY BLOOD GLUCOSE      POCT Blood Glucose.: 155 mg/dL (09 Jun 2020 12:08)  POCT Blood Glucose.: 176 mg/dL (08 Jun 2020 21:13)  POCT Blood Glucose.: 246 mg/dL (08 Jun 2020 17:22)      Radiology and Additional Studies:    Assessment:  The patient is a 86y Male who is now several hours post-RLE angiogram with balloon angioplasty of distal SFA    Plan:  - monitor L groin for signs of hematoma  - cares per primary team and nephrology for management of CKD 5  - no further vascular surgical intervention indicated at this time

## 2020-06-09 NOTE — BRIEF OPERATIVE NOTE - NSICDXBRIEFPROCEDURE_GEN_ALL_CORE_FT
PROCEDURES:  Fluoroscopic angioplasty of superficial femoral artery 09-Jun-2020 10:57:19 Balloon angioplasty of distal SFA Enzo Alonso

## 2020-06-09 NOTE — CHART NOTE - NSCHARTNOTEFT_GEN_A_CORE
patient with anemia - probably due to chronic disease, however had hx of iron defc in past w/ iv iron transfusiojns    check iron panel b12 folate

## 2020-06-10 ENCOUNTER — TRANSCRIPTION ENCOUNTER (OUTPATIENT)
Age: 85
End: 2020-06-10

## 2020-06-10 VITALS
SYSTOLIC BLOOD PRESSURE: 136 MMHG | HEART RATE: 98 BPM | RESPIRATION RATE: 19 BRPM | DIASTOLIC BLOOD PRESSURE: 63 MMHG | OXYGEN SATURATION: 95 %

## 2020-06-10 LAB
ANION GAP SERPL CALC-SCNC: 19 MMOL/L — HIGH (ref 5–17)
BUN SERPL-MCNC: 109 MG/DL — HIGH (ref 8–20)
CALCIUM SERPL-MCNC: 8.8 MG/DL — SIGNIFICANT CHANGE UP (ref 8.6–10.2)
CHLORIDE SERPL-SCNC: 99 MMOL/L — SIGNIFICANT CHANGE UP (ref 98–107)
CO2 SERPL-SCNC: 20 MMOL/L — LOW (ref 22–29)
CREAT SERPL-MCNC: 5.95 MG/DL — HIGH (ref 0.5–1.3)
FERRITIN SERPL-MCNC: 83 NG/ML — SIGNIFICANT CHANGE UP (ref 30–400)
FOLATE SERPL-MCNC: >20 NG/ML — SIGNIFICANT CHANGE UP
GLUCOSE BLDC GLUCOMTR-MCNC: 309 MG/DL — HIGH (ref 70–99)
GLUCOSE SERPL-MCNC: 150 MG/DL — HIGH (ref 70–99)
HCT VFR BLD CALC: 30.5 % — LOW (ref 39–50)
HGB BLD-MCNC: 9.6 G/DL — LOW (ref 13–17)
IRON SATN MFR SERPL: 13 % — LOW (ref 16–55)
IRON SATN MFR SERPL: 38 UG/DL — LOW (ref 59–158)
MAGNESIUM SERPL-MCNC: 2.3 MG/DL — SIGNIFICANT CHANGE UP (ref 1.6–2.6)
MCHC RBC-ENTMCNC: 28.9 PG — SIGNIFICANT CHANGE UP (ref 27–34)
MCHC RBC-ENTMCNC: 31.5 GM/DL — LOW (ref 32–36)
MCV RBC AUTO: 91.9 FL — SIGNIFICANT CHANGE UP (ref 80–100)
PHOSPHATE SERPL-MCNC: 5.6 MG/DL — HIGH (ref 2.4–4.7)
PLATELET # BLD AUTO: 194 K/UL — SIGNIFICANT CHANGE UP (ref 150–400)
POTASSIUM SERPL-MCNC: 3.5 MMOL/L — SIGNIFICANT CHANGE UP (ref 3.5–5.3)
POTASSIUM SERPL-SCNC: 3.5 MMOL/L — SIGNIFICANT CHANGE UP (ref 3.5–5.3)
RBC # BLD: 3.32 M/UL — LOW (ref 4.2–5.8)
RBC # FLD: 17.6 % — HIGH (ref 10.3–14.5)
SODIUM SERPL-SCNC: 138 MMOL/L — SIGNIFICANT CHANGE UP (ref 135–145)
TIBC SERPL-MCNC: 289 UG/DL — SIGNIFICANT CHANGE UP (ref 220–430)
TRANSFERRIN SERPL-MCNC: 202 MG/DL — SIGNIFICANT CHANGE UP (ref 180–329)
VIT B12 SERPL-MCNC: 746 PG/ML — SIGNIFICANT CHANGE UP (ref 232–1245)
WBC # BLD: 8.22 K/UL — SIGNIFICANT CHANGE UP (ref 3.8–10.5)
WBC # FLD AUTO: 8.22 K/UL — SIGNIFICANT CHANGE UP (ref 3.8–10.5)

## 2020-06-10 PROCEDURE — 87635 SARS-COV-2 COVID-19 AMP PRB: CPT

## 2020-06-10 PROCEDURE — 85027 COMPLETE CBC AUTOMATED: CPT

## 2020-06-10 PROCEDURE — 33285 INSJ SUBQ CAR RHYTHM MNTR: CPT

## 2020-06-10 PROCEDURE — 85730 THROMBOPLASTIN TIME PARTIAL: CPT

## 2020-06-10 PROCEDURE — P9016: CPT

## 2020-06-10 PROCEDURE — 86901 BLOOD TYPING SEROLOGIC RH(D): CPT

## 2020-06-10 PROCEDURE — 80048 BASIC METABOLIC PNL TOTAL CA: CPT

## 2020-06-10 PROCEDURE — 80053 COMPREHEN METABOLIC PANEL: CPT

## 2020-06-10 PROCEDURE — 83550 IRON BINDING TEST: CPT

## 2020-06-10 PROCEDURE — 94760 N-INVAS EAR/PLS OXIMETRY 1: CPT

## 2020-06-10 PROCEDURE — 83540 ASSAY OF IRON: CPT

## 2020-06-10 PROCEDURE — C1725: CPT

## 2020-06-10 PROCEDURE — 36430 TRANSFUSION BLD/BLD COMPNT: CPT

## 2020-06-10 PROCEDURE — 99239 HOSP IP/OBS DSCHRG MGMT >30: CPT

## 2020-06-10 PROCEDURE — 99285 EMERGENCY DEPT VISIT HI MDM: CPT | Mod: 25

## 2020-06-10 PROCEDURE — 82746 ASSAY OF FOLIC ACID SERUM: CPT

## 2020-06-10 PROCEDURE — 75710 ARTERY X-RAYS ARM/LEG: CPT | Mod: XU,RT

## 2020-06-10 PROCEDURE — 82962 GLUCOSE BLOOD TEST: CPT

## 2020-06-10 PROCEDURE — C1764: CPT

## 2020-06-10 PROCEDURE — C1887: CPT

## 2020-06-10 PROCEDURE — 99152 MOD SED SAME PHYS/QHP 5/>YRS: CPT

## 2020-06-10 PROCEDURE — 86900 BLOOD TYPING SEROLOGIC ABO: CPT

## 2020-06-10 PROCEDURE — 83880 ASSAY OF NATRIURETIC PEPTIDE: CPT

## 2020-06-10 PROCEDURE — 94640 AIRWAY INHALATION TREATMENT: CPT

## 2020-06-10 PROCEDURE — 84484 ASSAY OF TROPONIN QUANT: CPT

## 2020-06-10 PROCEDURE — C1760: CPT

## 2020-06-10 PROCEDURE — 71046 X-RAY EXAM CHEST 2 VIEWS: CPT

## 2020-06-10 PROCEDURE — 99233 SBSQ HOSP IP/OBS HIGH 50: CPT

## 2020-06-10 PROCEDURE — C1894: CPT

## 2020-06-10 PROCEDURE — 37224: CPT | Mod: RT

## 2020-06-10 PROCEDURE — 93005 ELECTROCARDIOGRAM TRACING: CPT

## 2020-06-10 PROCEDURE — C1769: CPT

## 2020-06-10 PROCEDURE — 85610 PROTHROMBIN TIME: CPT

## 2020-06-10 PROCEDURE — 84466 ASSAY OF TRANSFERRIN: CPT

## 2020-06-10 PROCEDURE — 84100 ASSAY OF PHOSPHORUS: CPT

## 2020-06-10 PROCEDURE — 99153 MOD SED SAME PHYS/QHP EA: CPT

## 2020-06-10 PROCEDURE — 83036 HEMOGLOBIN GLYCOSYLATED A1C: CPT

## 2020-06-10 PROCEDURE — 82607 VITAMIN B-12: CPT

## 2020-06-10 PROCEDURE — 83735 ASSAY OF MAGNESIUM: CPT

## 2020-06-10 PROCEDURE — 86850 RBC ANTIBODY SCREEN: CPT

## 2020-06-10 PROCEDURE — 82728 ASSAY OF FERRITIN: CPT

## 2020-06-10 PROCEDURE — 86923 COMPATIBILITY TEST ELECTRIC: CPT

## 2020-06-10 PROCEDURE — 36415 COLL VENOUS BLD VENIPUNCTURE: CPT

## 2020-06-10 PROCEDURE — 81001 URINALYSIS AUTO W/SCOPE: CPT

## 2020-06-10 RX ORDER — DARBEPOETIN ALFA IN POLYSORBAT 200MCG/0.4
100 PEN INJECTOR (ML) SUBCUTANEOUS ONCE
Refills: 0 | Status: COMPLETED | OUTPATIENT
Start: 2020-06-10 | End: 2020-06-10

## 2020-06-10 RX ORDER — ATORVASTATIN CALCIUM 80 MG/1
1 TABLET, FILM COATED ORAL
Qty: 15 | Refills: 0
Start: 2020-06-10 | End: 2020-06-24

## 2020-06-10 RX ORDER — ATORVASTATIN CALCIUM 80 MG/1
1 TABLET, FILM COATED ORAL
Qty: 0 | Refills: 0 | DISCHARGE
Start: 2020-06-10 | End: 2020-06-24

## 2020-06-10 RX ORDER — DARBEPOETIN ALFA IN POLYSORBAT 200MCG/0.4
100 PEN INJECTOR (ML) SUBCUTANEOUS ONCE
Refills: 0 | Status: DISCONTINUED | OUTPATIENT
Start: 2020-06-10 | End: 2020-06-10

## 2020-06-10 RX ORDER — CEFAZOLIN SODIUM 1 G
2000 VIAL (EA) INJECTION ONCE
Refills: 0 | Status: COMPLETED | OUTPATIENT
Start: 2020-06-10 | End: 2020-06-10

## 2020-06-10 RX ADMIN — Medication 325 MILLIGRAM(S): at 05:21

## 2020-06-10 RX ADMIN — Medication 1 TABLET(S): at 14:12

## 2020-06-10 RX ADMIN — CALCITRIOL 0.25 MICROGRAM(S): 0.5 CAPSULE ORAL at 14:12

## 2020-06-10 RX ADMIN — Medication 100 MICROGRAM(S): at 13:54

## 2020-06-10 RX ADMIN — Medication 81 MILLIGRAM(S): at 14:11

## 2020-06-10 RX ADMIN — Medication 50 MILLIGRAM(S): at 05:22

## 2020-06-10 RX ADMIN — Medication 20 MILLIGRAM(S): at 11:12

## 2020-06-10 RX ADMIN — CILOSTAZOL 100 MILLIGRAM(S): 100 TABLET ORAL at 05:22

## 2020-06-10 RX ADMIN — Medication 90 MILLIGRAM(S): at 05:22

## 2020-06-10 RX ADMIN — Medication 1300 MILLIGRAM(S): at 05:22

## 2020-06-10 RX ADMIN — Medication 100 MILLIGRAM(S): at 09:47

## 2020-06-10 RX ADMIN — Medication 50 MILLIGRAM(S): at 13:53

## 2020-06-10 RX ADMIN — Medication 325 MILLIGRAM(S): at 13:53

## 2020-06-10 RX ADMIN — Medication 250 MILLIGRAM(S): at 14:13

## 2020-06-10 NOTE — DISCHARGE NOTE PROVIDER - NSDCCPTREATMENT_GEN_ALL_CORE_FT
PRINCIPAL PROCEDURE  Procedure: Insertion, loop recorder  Findings and Treatment: Loop Recorder Incision Care:     - Remove the plastic and gauze dressing after 24 hours.   - Do not touch the incision until it is completely healed.   - There is Dermabond (skin glue) on your incision, which will start to flake off on its own over the next 2-3 weeks. Do not pick at or peal off the Dermabond.   - Do not apply soaps, creams, lotions, ointments or powders to the incision until it is completely healed.  - You should call the doctor if you notice redness, drainage, swelling, increased tenderness, hot sensation around the  incision, bleeding or incision edges pulling apart.

## 2020-06-10 NOTE — PROGRESS NOTE ADULT - SUBJECTIVE AND OBJECTIVE BOX
HPI:    86y Male with past medical history as under presenting with 3wk hx of b/l LE pain, more significant on his RLE. Patient reports shooting pain from the calf to the ankle, upon exertion and at rest. Patient was started on Cilostazol on last vascular f/u, without improvement of symptoms.  Pt was seen by cardiologist Dr. Peterson last week for worsening dyspnea and PND and his torsemide was increased to 20 mg bid without improvement. Pt is known to our service; he has followed with Dr. Avendano and myself for CKD V. He had a LUE AVF creation which has never been used. Pt seen and examined in ED; feels well. Denies any acute complaint at this time.    PAST HISTORY  --------------------------------------------------------------------------------  PAST MEDICAL & SURGICAL HISTORY:  Risk factors for obstructive sleep apnea  Anemia  RICHARDS (dyspnea on exertion)  VT (ventricular tachycardia)  HTN (hypertension)  CAD (coronary artery disease)  CKD (chronic kidney disease): stage IV  Arrhythmia  AV block, 1st degree  DM (diabetes mellitus)  H/O carotid endarterectomy: Right  A-V fistula: left arm 2017  H/O angioplasty: ,  no  intervention  H/O left knee surgery  H/O circumcision: at  age  65    FAMILY HISTORY:  Family history of premature CAD  Family history of lung cancer (Grandparent)  Family history of cancer (Grandparent)    PAST SOCIAL HISTORY: lives at home    ALLERGIES & MEDICATIONS  --------------------------------------------------------------------------------  Allergies    Plavix (Hives)  Toprol-XL (Rash)    Standing Inpatient Medications  furosemide   Injectable 40 milliGRAM(s) IV Push Once    REVIEW OF SYSTEMS  --------------------------------------------------------------------------------  Gen: No weight changes, fatigue, fevers/chills, weakness  Skin: No rashes  Head/Eyes/Ears/Mouth: No headache; Normal hearing; Normal vision w/o blurriness; No sinus pain/discomfort, sore throat  Respiratory: No dyspnea, cough, wheezing, hemoptysis  CV: No chest pain, PND, orthopnea  GI: No abdominal pain, diarrhea, constipation, nausea, vomiting, melena, hematochezia  : No increased frequency, dysuria, hematuria, nocturia  MSK: No joint pain/swelling; no back pain; no edema  Neuro: No dizziness/lightheadedness, weakness, seizures, numbness, tingling  Heme: No easy bruising or bleeding  Endo: No heat/cold intolerance  Psych: No significant nervousness, anxiety, stress, depression    All other systems were reviewed and are negative, except as noted.    VITALS/PHYSICAL EXAM  --------------------------------------------------------------------------------  Vital Signs Last 24 Hrs,    T(C): 36.8 (10 Rolly 2020 09:53), Max: 36.8 (2020 12:14)  T(F): 98.2 (10 Rolly 2020 09:53), Max: 98.3 (10 Rolly 2020 05:21)  HR: 91 (10 Rolly 2020 09:53) (85 - 114)  BP: 130/62 (10 Rolly 2020 09:53) (130/62 - 172/71)  RR: 15 (10 Rolly 2020 09:53) (15 - 20)  SpO2: 90% (10 Rolly 2020 09:53) (90% - 97%)    Height (cm): 165.1 (20 @ 09:15)  Weight (kg): 69.4 (20 @ 09:15)  BMI (kg/m2): 25.5 (20 @ 09:15)  BSA (m2): 1.77 (20 @ 09:15)      Physical Exam:  	Gen: NAD, thin  	HEENT: PERRL, supple neck, clear oropharynx  	Pulm: b/l faint crackles  	CV: RRR, S1S2;, DEANA+  	Back: No spinal or CVA tenderness;  	Abd: +BS, soft, nontender/nondistended  	: No suprapubic tenderness  	UE: Warm, no edema; no asterixis  	LE: Warm, no edema  	Neuro: No focal deficits, intact gait  	Psych: Normal affect and mood  	Skin: Warm, paler  	Vascular access: YOVANI AVF thrill/ bruit+    LABS/STUDIES  --------------------------------------------------------------------------------    138    |  99     |  109.0<H>  ----------------------------<  150<H>  Ca:8.8   (10 Rolly 2020 06:29)  3.5     |  20.0<L>  |  5.95<H>    eGFR if Non : 8 <L>  eGFR if : 9 <L>    TPro  6.9    /  Alb  3.9    /  TBili  0.3<L>  /  DBili  x      /  AST  59<H>  /  ALT  46<H>  /  AlkPhos  93     2020 09:57                        9.6<L>  8.22  )-----------( 194      ( 10 Rolly 2020 06:29 )             30.5<L>    Phos:5.6 mg/dL<H> M.3 mg/dL PTH:-- Uric acid:-- Serum Osm:--  Ferritin:83 ng/mL Iron:38 ug/dL<L> TIBC:289 ug/dL Tsat:13 %<L>  B12:-- TSH:746 pg/mL (06-10 @ 06:29)    Urinalysis Basic - ( 2020 13:12 )  Color: Yellow / Appearance: Slightly Turbid<!> / S.010 / pH: x  Gluc: x / Ketone: Negative  / Bili: Negative / Urobili: Negative mg/dL   Blood: x / Protein: 100 mg/dL<!> / Nitrite: Negative   Leuk Esterase: Negative / RBC: 0-2 /HPF / WBC 0-2   Sq Epi: x / Non Sq Epi: Occasional / Bacteria: Few<!>                8.0    8.76  >-----------<  252      [20 @ 09:57]              25.8     134  |  93  |  104.0  ----------------------------<  342      [20 @ 09:57]  4.9   |  20.0  |  5.94        Ca     9.3     [20 @ 09:57]    TPro  6.9  /  Alb  3.9  /  TBili  0.3  /  DBili  x   /  AST  59  /  ALT  46  /  AlkPhos  93  [20 @ 09:57]    PT/INR: PT 11.2 , INR 0.99       [20 @ 09:57]  PTT: 22.1       [20 @ 09:57]    Troponin 0.09      [20 @ 09:57]    Creatinine Trend:  SCr 5.94 [ 09:57]    Urinalysis - [20 @ 16:53]      Color Yellow / Appearance Clear / SG 1.010 / pH 6.0      Gluc 100 / Ketone Negative  / Bili Negative / Urobili Negative       Blood Trace / Protein 100 / Leuk Est Negative / Nitrite Negative      RBC 0-2 / WBC 0-2 / Hyaline  / Gran  / Sq Epi  / Non Sq Epi Occasional / Bacteria Occasional      Iron 79, TIBC 289, %sat 27      [20 @ 21:56]  Ferritin 248      [20 @ 21:56]  HbA1c 4.9      [18 @ 05:54]    HBsAb <3.0      [18 @ 18:29]  HBsAg Nonreact      [18 @ 18:29]  HBcAb Nonreact      [18 @ 18:29]  HCV 0.09, Nonreact      [18 @ 18:29]    Pt with CKD stage V, admitted for anemia    1)CKD stage V- not on dialysis. BUN/Cr elevated. Family has been refusing HD in the past.   2) Acute on chronic anemia- Plan for PRBC transfusion. Gets Aranesp injections at Dr. Baker's office. Monitor H/H  3) Acute on chronic CHFpEF; agree with IV lasix post transfusion. Continue Torsemide 20 mg bid  4) PAD - Evaluated by vascular surgery. Plan for angiogram of RLE and possible angioplasty tomorrow  5) metabolic acidosis; po sodium bicarb 1300 mg bid    monitor BUN/Cr , lytes and UOP

## 2020-06-10 NOTE — DISCHARGE NOTE NURSING/CASE MANAGEMENT/SOCIAL WORK - PATIENT PORTAL LINK FT
You can access the FollowMyHealth Patient Portal offered by [facility] by registering at the following website: [email or web address]. By joining [facility]’s FollowMyHealth portal, you will also be able to view your health information using other applications (apps) compatible with our system.

## 2020-06-10 NOTE — DISCHARGE NOTE PROVIDER - PROVIDER TOKENS
PROVIDER:[TOKEN:[[id number]]],PROVIDER:[TOKEN:[[id number]]],PROVIDER:[TOKEN:[[id number]]],PROVIDER:[TOKEN:[[id number]]]

## 2020-06-10 NOTE — DISCHARGE NOTE PROVIDER - NSDCCPCAREPLAN_GEN_ALL_CORE_FT
PRINCIPAL DISCHARGE DIAGNOSIS  Diagnosis: Pain in both lower extremities  Assessment and Plan of Treatment: - s/p angiogram and angioplasty  - follow up with pmd nephrology vascular and cardio  - take medications as rx

## 2020-06-10 NOTE — PROGRESS NOTE ADULT - SUBJECTIVE AND OBJECTIVE BOX
Patient is a 86y old  Male who presents with a chief complaint of RLE Pain (10 Rolly 2020 08:57)    Patient seen and examined at bedside.      ALLERGIES:  Plavix (Hives)  Toprol-XL (Rash)    MEDICATIONS  (STANDING):  albuterol/ipratropium for Nebulization 3 milliLiter(s) Nebulizer every 6 hours  ascorbic acid 250 milliGRAM(s) Oral daily  aspirin enteric coated 81 milliGRAM(s) Oral daily  atorvastatin 80 milliGRAM(s) Oral at bedtime  calcitriol   Capsule 0.25 MICROGram(s) Oral daily  cilostazol 100 milliGRAM(s) Oral two times a day  cloNIDine 0.1 milliGRAM(s) Oral at bedtime  darbepoetin Injectable Syringe 100 MICROGram(s) SubCutaneous once  ferrous    sulfate 325 milliGRAM(s) Oral three times a day  hydrALAZINE 50 milliGRAM(s) Oral three times a day  isosorbide   mononitrate ER Tablet (IMDUR) 30 milliGRAM(s) Oral <User Schedule>  Nephro-pito 1 Tablet(s) Oral daily  NIFEdipine XL 90 milliGRAM(s) Oral daily  NIFEdipine XL 60 milliGRAM(s) Oral at bedtime  sodium bicarbonate 1300 milliGRAM(s) Oral every 12 hours  torsemide 20 milliGRAM(s) Oral two times a day    MEDICATIONS  (PRN):  pantoprazole    Tablet 40 milliGRAM(s) Oral <User Schedule> PRN gerd    Vital Signs Last 24 Hrs  T(F): 98.3 (10 Rolly 2020 08:24), Max: 98.3 (10 Rolly 2020 05:21)  HR: 91 (10 Rolly 2020 08:24) (85 - 114)  BP: 134/64 (10 Rolly 2020 08:24) (134/64 - 172/71)  RR: 20 (10 Rolly 2020 08:24) (16 - 20)  SpO2: 97% (10 Rolly 2020 08:24) (95% - 98%)  I&O's Summary    2020 07:01  -  10 Rolly 2020 07:00  --------------------------------------------------------  IN: 120 mL / OUT: 200 mL / NET: -80 mL    PHYSICAL EXAM:  General: NAD, alert  ENT: MMM, no thrush  Neck: Supple, No JVD  Lungs: Clear to auscultation bilaterally, good air entry, non-labored breathing  Cardio: +s1/s2 +edema  Abdomen: Soft, Nontender, Nondistended; Bowel sounds present  Extremities: No calf tenderness      LABS:                        9.6    8.22  )-----------( 194      ( 10 Rolly 2020 06:29 )             30.5     06-10    138  |  99  |  109.0  ----------------------------<  150  3.5   |  20.0  |  5.95    Ca    8.8      10 Rolly 2020 06:29  Phos  5.6     06-10  Mg     2.3     10    TPro  6.9  /  Alb  3.9  /  TBili  0.3  /  DBili  x   /  AST  59  /  ALT  46  /  AlkPhos  93  06-08    eGFR if Non African American: 8 mL/min/1.73M2 (06-10-20 @ 06:29)  eGFR if African American: 9 mL/min/1.73M2 (06-10-20 @ 06:29)    PT/INR - ( 2020 09:57 )   PT: 11.2 sec;   INR: 0.99 ratio         PTT - ( 2020 09:57 )  PTT:22.1 sec      CARDIAC MARKERS ( 2020 12:42 )  x     / 0.09 ng/mL / x     / x     / x      CARDIAC MARKERS ( 2020 09:57 )  x     / 0.09 ng/mL / x     / x     / x        Glucose  POCT Blood Glucose.: 259 mg/dL (2020 21:17)  POCT Blood Glucose.: 207 mg/dL (2020 17:20)  POCT Blood Glucose.: 155 mg/dL (2020 12:08)    Urinalysis Basic - ( 2020 13:12 )  Color: Yellow / Appearance: Slightly Turbid / S.010 / pH: x  Gluc: x / Ketone: Negative  / Bili: Negative / Urobili: Negative mg/dL   Blood: x / Protein: 100 mg/dL / Nitrite: Negative   Leuk Esterase: Negative / RBC: 0-2 /HPF / WBC 0-2   Sq Epi: x / Non Sq Epi: Occasional / Bacteria: Few    RADIOLOGY & ADDITIONAL TESTS:  < from: Xray Chest 2 Views PA/Lat (20 @ 10:17) >  IMPRESSION:  Congestive changes as noted.  < end of copied text >    Care Discussed with Consultants/Other Providers:   Nephrology

## 2020-06-10 NOTE — DISCHARGE NOTE PROVIDER - CARE PROVIDERS DIRECT ADDRESSES
[email or web address]

## 2020-06-10 NOTE — DISCHARGE NOTE PROVIDER - NSDCFUSCHEDAPPT_GEN_ALL_CORE_FT
CHRISTIANO ELIZABETH ; 06/12/2020 ; [facility]readmit  CHRISTIANO ELIZABETH ; 06/23/2020 ; [facility] CHRISTIANO ELIZABETH ; 06/12/2020 ; [facility]readmit  CHRISTIANO ELIZABETH ; 06/23/2020 ; [facility] CHRISTIANO ELIZABETH ; 06/12/2020 ; [facility] Preadmit  CHRISTIANO ELIZABETH ; 06/23/2020 ; [facility]

## 2020-06-10 NOTE — PROGRESS NOTE ADULT - ASSESSMENT
86 year old male with a history of CKD stage 5, anemia on chronic disease and previous GI bleed status post colonoscopy with two ulcerated polyps s/p resections and colonic AVMs, EGD with gastritis, VPCs coming to hospital with complaints of RLE pain.     #RLE pain   - w/ claudication probable 2/2 PAD  - vascular consult appreciated- s/p angiogram & angioplasty via vascular    #elevated blood glucose  - a1c 5.2 on admit - iss d/c     #troponinemia  - probable 2/2 demand  - trend  - cardio consult appreciated     #Shortness of breath  - Likely multifactorial including anemia, CKD, AS, volume overload  - patient was to get ILR outpatient with Dr Palma - to happen prior to d/c    #CKD stage 5  - not on HD (per family request)  - nephro consult appreciated  - monitor cr   - sodium bicarb per renal   - avoid nephrotoxic meds     #acute on Chronic diastolic CHF  - torsemide  - patient does get metalozone on wednesdays)   - LVEF 65% 12/2019  - cardio consult appreciated     #Acute on chronic anemia   - likely secondary to underlying CKD  - s/p 2 units prbc  - Monitor Hb/hct  - aranesp    #Hypertension  - monitor blood pressure   - imdur, hydralyzine, nifedipine, and clonidine      #DVT prophylaxis  - venodynes

## 2020-06-10 NOTE — PROGRESS NOTE ADULT - SUBJECTIVE AND OBJECTIVE BOX
Patient seen today in holding area spot 7 s/p successful and uncomplicated loop recorder insertion  Loop Recorder Incision Care:     - Remove the plastic and gauze dressing after 24 hours.   - Do not touch the incision until it is completely healed.   - There is Dermabond (skin glue) on your incision, which will start to flake off on its own over the next 2-3 weeks. Do not pick at or peal off the Dermabond.   - Do not apply soaps, creams, lotions, ointments or powders to the incision until it is completely healed.  - You should call the doctor if you notice redness, drainage, swelling, increased tenderness, hot sensation around the  incision, bleeding or incision edges pulling apart.  - Follow up with Dr. Johns in two weeks time.

## 2020-06-10 NOTE — PROGRESS NOTE ADULT - SUBJECTIVE AND OBJECTIVE BOX
Patient is a 86y old  Male who presents with a chief complaint of RLE Pain (09 Jun 2020 11:57)    Pt is S/P RLE angio/angioplasty of distal SFA     POD# 1  No acute events overnight. Pt reports no pain in R foot this am, he denies any weakness or L groin discomfort. Scheduled for loop recorder today and reports he is going home after.   Ambulating without complaints except for fatigue    Vital Signs Last 24 Hrs  T(C): 36.8 (10 Rolly 2020 05:21), Max: 36.8 (09 Jun 2020 12:14)  T(F): 98.3 (10 Rolly 2020 05:21), Max: 98.3 (10 Rolly 2020 05:21)  HR: 98 (10 Rolly 2020 05:21) (85 - 114)  BP: 144/71 (10 Rolly 2020 05:21) (143/63 - 172/71)  BP(mean): --  RR: 16 (10 Rolly 2020 05:21) (16 - 18)  SpO2: 95% (10 Rolly 2020 05:21) (95% - 98%)  I&O's Detail    09 Jun 2020 07:01  -  10 Rolly 2020 07:00  --------------------------------------------------------  IN:    Oral Fluid: 120 mL  Total IN: 120 mL    OUT:    Voided: 200 mL  Total OUT: 200 mL    Total NET: -80 mL    MEDICATIONS  (STANDING):  albuterol/ipratropium for Nebulization 3 milliLiter(s) Nebulizer every 6 hours  ascorbic acid 250 milliGRAM(s) Oral daily  aspirin enteric coated 81 milliGRAM(s) Oral daily  atorvastatin 80 milliGRAM(s) Oral at bedtime  calcitriol   Capsule 0.25 MICROGram(s) Oral daily  cilostazol 100 milliGRAM(s) Oral two times a day  cloNIDine 0.1 milliGRAM(s) Oral at bedtime  ferrous    sulfate 325 milliGRAM(s) Oral three times a day  hydrALAZINE 50 milliGRAM(s) Oral three times a day  isosorbide   mononitrate ER Tablet (IMDUR) 30 milliGRAM(s) Oral <User Schedule>  Nephro-pito 1 Tablet(s) Oral daily  NIFEdipine XL 90 milliGRAM(s) Oral daily  NIFEdipine XL 60 milliGRAM(s) Oral at bedtime  sodium bicarbonate 1300 milliGRAM(s) Oral every 12 hours  torsemide 20 milliGRAM(s) Oral two times a day    MEDICATIONS  (PRN):  pantoprazole    Tablet 40 milliGRAM(s) Oral <User Schedule> PRN gerd    PAST MEDICAL & SURGICAL HISTORY:  Risk factors for obstructive sleep apnea  Anemia  RICHARDS (dyspnea on exertion)  VT (ventricular tachycardia)  HTN (hypertension)  CAD (coronary artery disease)  CKD (chronic kidney disease): stage IV  Arrhythmia  AV block, 1st degree  DM (diabetes mellitus)  H/O carotid endarterectomy: Right  A-V fistula: left arm 5/2017  H/O angioplasty: 2013,  no  intervention  H/O left knee surgery  H/O circumcision: at  age  65    Physical Exam:  General: NAD, resting comfortably in bed  Extremities: BLE WWP. L groin without hematoma and small area of ecchymosis.   Pulses:   Right:                                                                          Left:  FEM [x ]2+ [ ]1+ [ ]doppler                                    FEM [ ]2+ [x ]1+ [ ]doppler    POP [x ]2+ [ ]1+ [ ]doppler                                    POP [x ]2+ [ ]1+ [ ]doppler    DP [ ]2+ [ ]1+ [x ]doppler                                      DP [ ]2+ [ ]1+ [x ]doppler  PT[ ]2+ [ ]1+ [ ]doppler                                          PT [ ]2+ [ ]1+ [ ]doppler      LABS:                        9.6    8.22  )-----------( 194      ( 10 Rolly 2020 06:29 )             30.5     06-10    138  |  99  |  109.0<H>  ----------------------------<  150<H>  3.5   |  20.0<L>  |  5.95<H>    Ca    8.8      10 Rolly 2020 06:29  Phos  5.6     06-10  Mg     2.3     06-10    TPro  6.9  /  Alb  3.9  /  TBili  0.3<L>  /  DBili  x   /  AST  59<H>  /  ALT  46<H>  /  AlkPhos  93  06-08    PT/INR - ( 08 Jun 2020 09:57 )   PT: 11.2 sec;   INR: 0.99 ratio         PTT - ( 08 Jun 2020 09:57 )  PTT:22.1 sec    CAPILLARY BLOOD GLUCOSE  POCT Blood Glucose.: 259 mg/dL (09 Jun 2020 21:17)  POCT Blood Glucose.: 207 mg/dL (09 Jun 2020 17:20)  POCT Blood Glucose.: 155 mg/dL (09 Jun 2020 12:08)      Radiology and Additional Studies:    Assessment:86yMaleHPI:  86 year old male with a history of CKD stage 5, anemia on chronic disease and previous GI bleed status post colonoscopy with two ulcerated polyps s/p resections and colonic AVMs, EGD with gastritis, VPCs coming to hospital with complaints of RLE pain. Per patient daughter who is RN low flow seen on outpatient US at Dr Sorenson office. Patient stating for past 2 weeks has been increasing intermittent shooting right lower extremity pain. States worst at night. Improves when has legs hanging over edge of seat worst when legs straight. of note patient also stating having exertional dyspnea and states can not lay flat at night time. denies cp nausea vomiting or diarrhea at this time. (08 Jun 2020 14:53)   S/P RLE angio/distal R SFA  angioplasty   POD#1  Doing well without c/o rest pain    Plan:  Cont ASA and Statin  OOB/Ambulate/PT  DM management  DC L groin dressing 6/11. Pt may shower and wash area with soap and water. If any erythema or swelling pt to call vascular surgery office  Follow up with Dr Barbara Sorenson in 2 weeks at vascular office  Will sign off. Reconsult as needed

## 2020-06-10 NOTE — PROGRESS NOTE ADULT - ASSESSMENT
S/P PAD - Angioplasty,     S.P LR    S.P PRBC Transfusion,     DELAYING PROGRESSION OF CKD    ( Stage 5 - Not on HD )    D.W The daughter , Issac Ortiz  & Marilu Orr,     OP F.U in 2-3 weeks ,     Pain Management,

## 2020-06-10 NOTE — DISCHARGE NOTE PROVIDER - NSDCMRMEDTOKEN_GEN_ALL_CORE_FT
Aranesp 100 mcg/0.5 mL injectable solution: 0.5 milliliter(s) injectable every 2 weeks  aspirin 81 mg oral delayed release tablet: 1 tab(s) orally once a day  atorvastatin 80 mg oral tablet: 1 tab(s) orally once a day (at bedtime)  calcitriol 0.25 mcg oral capsule: 1 cap(s) orally once a day  cilostazol 100 mg oral tablet: 1 tab(s) orally 2 times a day  cloNIDine 0.1 mg oral tablet: 1 tab(s) orally once a day in the evening  ferrous sulfate 325 mg (65 mg elemental iron) oral delayed release tablet: 1 tab(s) orally 3 times a day  hydrALAZINE 50 mg oral tablet: 1 tab(s) orally 3 times a day  isosorbide mononitrate 30 mg oral tablet, extended release: 1 tab(s) orally once a day (at bedtime) 10PM  Nephro-Thomas oral tablet: 1 tab(s) orally once a day  NIFEdipine 60 mg oral tablet, extended release: 1 tab(s) orally once a day (at bedtime)  NIFEdipine 90 mg oral tablet, extended release: 1 tab(s) orally once a day in the morning  pantoprazole 40 mg oral delayed release tablet: 1 tab(s) orally 3 times a day, As Needed  torsemide 20 mg oral tablet: 1 tab(s) orally 2 times a day  Vitamin C 250 mg oral tablet: 1 tab(s) orally once a day  Vitamin D3 50,000 intl units oral capsule: 1 cap(s) orally once a week

## 2020-06-15 ENCOUNTER — APPOINTMENT (OUTPATIENT)
Dept: VASCULAR SURGERY | Facility: CLINIC | Age: 85
End: 2020-06-15
Payer: MEDICARE

## 2020-06-15 ENCOUNTER — APPOINTMENT (OUTPATIENT)
Dept: ORTHOPEDIC SURGERY | Facility: CLINIC | Age: 85
End: 2020-06-15
Payer: MEDICARE

## 2020-06-15 VITALS
TEMPERATURE: 98.9 F | DIASTOLIC BLOOD PRESSURE: 61 MMHG | WEIGHT: 162 LBS | SYSTOLIC BLOOD PRESSURE: 130 MMHG | HEIGHT: 66 IN | BODY MASS INDEX: 26.03 KG/M2 | HEART RATE: 75 BPM

## 2020-06-15 VITALS
DIASTOLIC BLOOD PRESSURE: 65 MMHG | SYSTOLIC BLOOD PRESSURE: 144 MMHG | OXYGEN SATURATION: 98 % | TEMPERATURE: 98.7 F | HEART RATE: 91 BPM

## 2020-06-15 DIAGNOSIS — R60.0 LOCALIZED EDEMA: ICD-10-CM

## 2020-06-15 DIAGNOSIS — M17.12 UNILATERAL PRIMARY OSTEOARTHRITIS, LEFT KNEE: ICD-10-CM

## 2020-06-15 PROCEDURE — 73562 X-RAY EXAM OF KNEE 3: CPT | Mod: LT

## 2020-06-15 PROCEDURE — 99204 OFFICE O/P NEW MOD 45 MIN: CPT | Mod: 25

## 2020-06-15 PROCEDURE — 99024 POSTOP FOLLOW-UP VISIT: CPT

## 2020-06-15 PROCEDURE — 20610 DRAIN/INJ JOINT/BURSA W/O US: CPT | Mod: LT

## 2020-06-16 NOTE — ED PROVIDER NOTE - PRO INTERPRETER NEED 2
Pt with negative heart biopsy.   Pt to decrease prednisone dose to 15mg in the AM and 10mg in the PM.  CMV and EBV are also negative.  Pt states understanding instructions.   English

## 2020-06-18 ENCOUNTER — APPOINTMENT (OUTPATIENT)
Dept: HEMATOLOGY ONCOLOGY | Facility: CLINIC | Age: 85
End: 2020-06-18

## 2020-06-22 ENCOUNTER — OUTPATIENT (OUTPATIENT)
Dept: OUTPATIENT SERVICES | Facility: HOSPITAL | Age: 85
LOS: 1 days | Discharge: HOME | End: 2020-06-22

## 2020-06-22 DIAGNOSIS — Z98.890 OTHER SPECIFIED POSTPROCEDURAL STATES: Chronic | ICD-10-CM

## 2020-06-22 DIAGNOSIS — I77.0 ARTERIOVENOUS FISTULA, ACQUIRED: Chronic | ICD-10-CM

## 2020-06-22 DIAGNOSIS — Z98.62 PERIPHERAL VASCULAR ANGIOPLASTY STATUS: Chronic | ICD-10-CM

## 2020-06-22 DIAGNOSIS — D63.1 ANEMIA IN CHRONIC KIDNEY DISEASE: ICD-10-CM

## 2020-06-23 ENCOUNTER — APPOINTMENT (OUTPATIENT)
Age: 85
End: 2020-06-23

## 2020-06-23 ENCOUNTER — APPOINTMENT (OUTPATIENT)
Dept: VASCULAR SURGERY | Facility: CLINIC | Age: 85
End: 2020-06-23
Payer: MEDICARE

## 2020-06-23 ENCOUNTER — RESULT REVIEW (OUTPATIENT)
Age: 85
End: 2020-06-23

## 2020-06-23 ENCOUNTER — INPATIENT (INPATIENT)
Facility: HOSPITAL | Age: 85
LOS: 3 days | Discharge: HOME | DRG: 252 | End: 2020-06-27
Attending: INTERNAL MEDICINE | Admitting: HOSPITALIST
Payer: MEDICARE

## 2020-06-23 ENCOUNTER — TRANSCRIPTION ENCOUNTER (OUTPATIENT)
Age: 85
End: 2020-06-23

## 2020-06-23 VITALS
TEMPERATURE: 99 F | RESPIRATION RATE: 20 BRPM | SYSTOLIC BLOOD PRESSURE: 119 MMHG | HEIGHT: 68 IN | WEIGHT: 156.09 LBS | OXYGEN SATURATION: 98 % | DIASTOLIC BLOOD PRESSURE: 56 MMHG | HEART RATE: 91 BPM

## 2020-06-23 VITALS
OXYGEN SATURATION: 97 % | TEMPERATURE: 98.7 F | SYSTOLIC BLOOD PRESSURE: 117 MMHG | BODY MASS INDEX: 25.23 KG/M2 | HEIGHT: 66 IN | DIASTOLIC BLOOD PRESSURE: 51 MMHG | HEART RATE: 99 BPM | WEIGHT: 157 LBS

## 2020-06-23 DIAGNOSIS — Z98.890 OTHER SPECIFIED POSTPROCEDURAL STATES: Chronic | ICD-10-CM

## 2020-06-23 DIAGNOSIS — N28.9 DISORDER OF KIDNEY AND URETER, UNSPECIFIED: ICD-10-CM

## 2020-06-23 DIAGNOSIS — Z98.62 PERIPHERAL VASCULAR ANGIOPLASTY STATUS: Chronic | ICD-10-CM

## 2020-06-23 DIAGNOSIS — I77.0 ARTERIOVENOUS FISTULA, ACQUIRED: Chronic | ICD-10-CM

## 2020-06-23 DIAGNOSIS — Z98.62 PERIPHERAL VASCULAR ANGIOPLASTY STATUS: ICD-10-CM

## 2020-06-23 LAB
ALBUMIN SERPL ELPH-MCNC: 3.6 G/DL — SIGNIFICANT CHANGE UP (ref 3.3–5.2)
ALP SERPL-CCNC: 82 U/L — SIGNIFICANT CHANGE UP (ref 40–120)
ALT FLD-CCNC: 53 U/L — HIGH
ANION GAP SERPL CALC-SCNC: 20 MMOL/L — HIGH (ref 5–17)
AST SERPL-CCNC: 36 U/L — SIGNIFICANT CHANGE UP
BASOPHILS # BLD AUTO: 0.06 K/UL — SIGNIFICANT CHANGE UP (ref 0–0.2)
BASOPHILS # BLD AUTO: 0.1 K/UL — SIGNIFICANT CHANGE UP (ref 0–0.2)
BASOPHILS NFR BLD AUTO: 0.6 % — SIGNIFICANT CHANGE UP (ref 0–2)
BASOPHILS NFR BLD AUTO: 1.2 % — SIGNIFICANT CHANGE UP (ref 0–2)
BILIRUB SERPL-MCNC: 0.3 MG/DL — LOW (ref 0.4–2)
BUN SERPL-MCNC: 142 MG/DL — HIGH (ref 8–20)
CALCIUM SERPL-MCNC: 10 MG/DL — SIGNIFICANT CHANGE UP (ref 8.6–10.2)
CHLORIDE SERPL-SCNC: 92 MMOL/L — LOW (ref 98–107)
CO2 SERPL-SCNC: 24 MMOL/L — SIGNIFICANT CHANGE UP (ref 22–29)
CREAT SERPL-MCNC: 7.45 MG/DL — HIGH (ref 0.5–1.3)
EOSINOPHIL # BLD AUTO: 0.22 K/UL — SIGNIFICANT CHANGE UP (ref 0–0.5)
EOSINOPHIL # BLD AUTO: 0.3 K/UL — SIGNIFICANT CHANGE UP (ref 0–0.5)
EOSINOPHIL NFR BLD AUTO: 2.3 % — SIGNIFICANT CHANGE UP (ref 0–6)
EOSINOPHIL NFR BLD AUTO: 2.6 % — SIGNIFICANT CHANGE UP (ref 0–6)
GLUCOSE BLDC GLUCOMTR-MCNC: 202 MG/DL — HIGH (ref 70–99)
GLUCOSE SERPL-MCNC: 245 MG/DL — HIGH (ref 70–99)
HCT VFR BLD CALC: 23.2 % — LOW (ref 39–50)
HCT VFR BLD CALC: 23.7 % — LOW (ref 39–50)
HGB BLD-MCNC: 7.4 G/DL — LOW (ref 13–17)
HGB BLD-MCNC: 7.6 G/DL — LOW (ref 13–17)
IMM GRANULOCYTES NFR BLD AUTO: 0.4 % — SIGNIFICANT CHANGE UP (ref 0–1.5)
LYMPHOCYTES # BLD AUTO: 0.55 K/UL — LOW (ref 1–3.3)
LYMPHOCYTES # BLD AUTO: 0.7 K/UL — LOW (ref 1–3.3)
LYMPHOCYTES # BLD AUTO: 5.6 % — LOW (ref 13–44)
LYMPHOCYTES # BLD AUTO: 7 % — LOW (ref 13–44)
MCHC RBC-ENTMCNC: 28.7 PG — SIGNIFICANT CHANGE UP (ref 27–34)
MCHC RBC-ENTMCNC: 29 PG — SIGNIFICANT CHANGE UP (ref 27–34)
MCHC RBC-ENTMCNC: 31.2 GM/DL — LOW (ref 32–36)
MCHC RBC-ENTMCNC: 32.9 G/DL — SIGNIFICANT CHANGE UP (ref 32–36)
MCV RBC AUTO: 88.2 FL — SIGNIFICANT CHANGE UP (ref 80–100)
MCV RBC AUTO: 91.9 FL — SIGNIFICANT CHANGE UP (ref 80–100)
MONOCYTES # BLD AUTO: 0.85 K/UL — SIGNIFICANT CHANGE UP (ref 0–0.9)
MONOCYTES # BLD AUTO: 0.9 K/UL — SIGNIFICANT CHANGE UP (ref 0–0.9)
MONOCYTES NFR BLD AUTO: 8.7 % — SIGNIFICANT CHANGE UP (ref 2–14)
MONOCYTES NFR BLD AUTO: 8.7 % — SIGNIFICANT CHANGE UP (ref 2–14)
NEUTROPHILS # BLD AUTO: 8.04 K/UL — HIGH (ref 1.8–7.4)
NEUTROPHILS # BLD AUTO: 8.2 K/UL — HIGH (ref 1.8–7.4)
NEUTROPHILS NFR BLD AUTO: 80.4 % — HIGH (ref 43–77)
NEUTROPHILS NFR BLD AUTO: 82.4 % — HIGH (ref 43–77)
PLATELET # BLD AUTO: 249 K/UL — SIGNIFICANT CHANGE UP (ref 150–400)
PLATELET # BLD AUTO: 254 K/UL — SIGNIFICANT CHANGE UP (ref 150–400)
POTASSIUM SERPL-MCNC: 4.7 MMOL/L — SIGNIFICANT CHANGE UP (ref 3.5–5.3)
POTASSIUM SERPL-SCNC: 4.7 MMOL/L — SIGNIFICANT CHANGE UP (ref 3.5–5.3)
PROT SERPL-MCNC: 6.3 G/DL — LOW (ref 6.6–8.7)
RBC # BLD: 2.58 M/UL — LOW (ref 4.2–5.8)
RBC # BLD: 2.63 M/UL — LOW (ref 4.2–5.8)
RBC # FLD: 15.4 % — HIGH (ref 10.3–14.5)
RBC # FLD: 15.6 % — HIGH (ref 10.3–14.5)
SARS-COV-2 RNA SPEC QL NAA+PROBE: SIGNIFICANT CHANGE UP
SODIUM SERPL-SCNC: 136 MMOL/L — SIGNIFICANT CHANGE UP (ref 135–145)
WBC # BLD: 10.2 K/UL — SIGNIFICANT CHANGE UP (ref 3.8–10.5)
WBC # BLD: 9.76 K/UL — SIGNIFICANT CHANGE UP (ref 3.8–10.5)
WBC # FLD AUTO: 10.2 K/UL — SIGNIFICANT CHANGE UP (ref 3.8–10.5)
WBC # FLD AUTO: 9.76 K/UL — SIGNIFICANT CHANGE UP (ref 3.8–10.5)

## 2020-06-23 PROCEDURE — 99285 EMERGENCY DEPT VISIT HI MDM: CPT

## 2020-06-23 PROCEDURE — 71045 X-RAY EXAM CHEST 1 VIEW: CPT | Mod: 26,77

## 2020-06-23 PROCEDURE — 71045 X-RAY EXAM CHEST 1 VIEW: CPT | Mod: 26

## 2020-06-23 PROCEDURE — 93010 ELECTROCARDIOGRAM REPORT: CPT

## 2020-06-23 PROCEDURE — 99222 1ST HOSP IP/OBS MODERATE 55: CPT

## 2020-06-23 PROCEDURE — 99024 POSTOP FOLLOW-UP VISIT: CPT

## 2020-06-23 RX ORDER — ATORVASTATIN CALCIUM 80 MG/1
40 TABLET, FILM COATED ORAL AT BEDTIME
Refills: 0 | Status: DISCONTINUED | OUTPATIENT
Start: 2020-06-23 | End: 2020-06-24

## 2020-06-23 RX ORDER — ATORVASTATIN CALCIUM 80 MG/1
80 TABLET, FILM COATED ORAL AT BEDTIME
Refills: 0 | Status: DISCONTINUED | OUTPATIENT
Start: 2020-06-23 | End: 2020-06-23

## 2020-06-23 RX ORDER — DEXTROSE 50 % IN WATER 50 %
12.5 SYRINGE (ML) INTRAVENOUS ONCE
Refills: 0 | Status: DISCONTINUED | OUTPATIENT
Start: 2020-06-23 | End: 2020-06-27

## 2020-06-23 RX ORDER — ASPIRIN/CALCIUM CARB/MAGNESIUM 324 MG
81 TABLET ORAL DAILY
Refills: 0 | Status: DISCONTINUED | OUTPATIENT
Start: 2020-06-24 | End: 2020-06-27

## 2020-06-23 RX ORDER — DEXTROSE 50 % IN WATER 50 %
25 SYRINGE (ML) INTRAVENOUS ONCE
Refills: 0 | Status: DISCONTINUED | OUTPATIENT
Start: 2020-06-23 | End: 2020-06-27

## 2020-06-23 RX ORDER — CILOSTAZOL 100 MG/1
100 TABLET ORAL ONCE
Refills: 0 | Status: COMPLETED | OUTPATIENT
Start: 2020-06-23 | End: 2020-06-23

## 2020-06-23 RX ORDER — INSULIN LISPRO 100/ML
VIAL (ML) SUBCUTANEOUS
Refills: 0 | Status: DISCONTINUED | OUTPATIENT
Start: 2020-06-23 | End: 2020-06-27

## 2020-06-23 RX ORDER — DEXTROSE 50 % IN WATER 50 %
15 SYRINGE (ML) INTRAVENOUS ONCE
Refills: 0 | Status: DISCONTINUED | OUTPATIENT
Start: 2020-06-23 | End: 2020-06-27

## 2020-06-23 RX ORDER — SODIUM CHLORIDE 9 MG/ML
1000 INJECTION, SOLUTION INTRAVENOUS
Refills: 0 | Status: DISCONTINUED | OUTPATIENT
Start: 2020-06-23 | End: 2020-06-27

## 2020-06-23 RX ORDER — HYDRALAZINE HCL 50 MG
50 TABLET ORAL THREE TIMES A DAY
Refills: 0 | Status: DISCONTINUED | OUTPATIENT
Start: 2020-06-23 | End: 2020-06-27

## 2020-06-23 RX ORDER — INSULIN LISPRO 100/ML
VIAL (ML) SUBCUTANEOUS AT BEDTIME
Refills: 0 | Status: DISCONTINUED | OUTPATIENT
Start: 2020-06-23 | End: 2020-06-27

## 2020-06-23 RX ORDER — GLUCAGON INJECTION, SOLUTION 0.5 MG/.1ML
1 INJECTION, SOLUTION SUBCUTANEOUS ONCE
Refills: 0 | Status: DISCONTINUED | OUTPATIENT
Start: 2020-06-23 | End: 2020-06-27

## 2020-06-23 RX ORDER — NIFEDIPINE 30 MG
60 TABLET, EXTENDED RELEASE 24 HR ORAL ONCE
Refills: 0 | Status: COMPLETED | OUTPATIENT
Start: 2020-06-23 | End: 2020-06-23

## 2020-06-23 RX ORDER — HYDRALAZINE HCL 50 MG
50 TABLET ORAL ONCE
Refills: 0 | Status: COMPLETED | OUTPATIENT
Start: 2020-06-23 | End: 2020-06-23

## 2020-06-23 RX ORDER — ISOSORBIDE MONONITRATE 60 MG/1
30 TABLET, EXTENDED RELEASE ORAL DAILY
Refills: 0 | Status: DISCONTINUED | OUTPATIENT
Start: 2020-06-23 | End: 2020-06-27

## 2020-06-23 RX ORDER — PANTOPRAZOLE SODIUM 20 MG/1
40 TABLET, DELAYED RELEASE ORAL
Refills: 0 | Status: DISCONTINUED | OUTPATIENT
Start: 2020-06-23 | End: 2020-06-27

## 2020-06-23 RX ORDER — CALCITRIOL 0.5 UG/1
0.25 CAPSULE ORAL DAILY
Refills: 0 | Status: DISCONTINUED | OUTPATIENT
Start: 2020-06-23 | End: 2020-06-27

## 2020-06-23 RX ORDER — FUROSEMIDE 40 MG
20 TABLET ORAL ONCE
Refills: 0 | Status: COMPLETED | OUTPATIENT
Start: 2020-06-23 | End: 2020-06-23

## 2020-06-23 RX ORDER — NIFEDIPINE 30 MG
60 TABLET, EXTENDED RELEASE 24 HR ORAL DAILY
Refills: 0 | Status: DISCONTINUED | OUTPATIENT
Start: 2020-06-24 | End: 2020-06-24

## 2020-06-23 RX ORDER — METOLAZONE 2.5 MG/1
2.5 TABLET ORAL
Qty: 30 | Refills: 3 | Status: DISCONTINUED | COMMUNITY
Start: 2019-01-25 | End: 2020-06-23

## 2020-06-23 RX ORDER — FERROUS SULFATE 325(65) MG
325 TABLET ORAL
Refills: 0 | Status: DISCONTINUED | OUTPATIENT
Start: 2020-06-23 | End: 2020-06-25

## 2020-06-23 RX ORDER — HEPARIN SODIUM 5000 [USP'U]/ML
5000 INJECTION INTRAVENOUS; SUBCUTANEOUS EVERY 12 HOURS
Refills: 0 | Status: DISCONTINUED | OUTPATIENT
Start: 2020-06-24 | End: 2020-06-25

## 2020-06-23 RX ADMIN — Medication 20 MILLIGRAM(S): at 19:55

## 2020-06-23 RX ADMIN — CILOSTAZOL 100 MILLIGRAM(S): 100 TABLET ORAL at 20:58

## 2020-06-23 RX ADMIN — Medication 60 MILLIGRAM(S): at 20:58

## 2020-06-23 RX ADMIN — ISOSORBIDE MONONITRATE 30 MILLIGRAM(S): 60 TABLET, EXTENDED RELEASE ORAL at 20:58

## 2020-06-23 RX ADMIN — Medication 50 MILLIGRAM(S): at 20:57

## 2020-06-23 RX ADMIN — ATORVASTATIN CALCIUM 80 MILLIGRAM(S): 80 TABLET, FILM COATED ORAL at 20:57

## 2020-06-23 NOTE — H&P ADULT - HISTORY OF PRESENT ILLNESS
87 y/o male with multiple medical problems including chronic anemia, ckd stage 5 and not on dialysis ( as per family wishes ) came to the ER as his Hb was low, in the ER Hb 7.4 , runs Hb in 8 and 9 range. pt. has generalized weakness which appears to be chronic. pt. reports no cp, no abd. pain, no blood per rectum, no cough, no fever. pt. is on Aranesp and got his shot today, follows with hematologist dr. Baker. uses iron as well. has received blood transfusion before as well. As per daughter his torsemide has been increased to 20 mg po twice daily from once a day for past 3 weeks ( by his cardiologist dr. nixon and dr. Doan ) , pt's Cr is 7.45 today and usually runs upper 4's to 6 range. no other complaints at this point. 85 y/o male with multiple medical problems including chronic anemia, ckd stage 5 and not on dialysis ( as per family wishes ) came to the ER as his Hb was low, in the ER Hb 7.4 , runs Hb in 8 and 9 range. pt. has generalized weakness which appears to be chronic. pt. reports no cp, no change in his breathing status, no abd. pain, no blood per rectum, no cough, no fever. pt. is on Aranesp and got his shot today, follows with hematologist dr. Baker. uses iron as well. has received blood transfusion before as well. As per daughter his torsemide has been increased to 20 mg po twice daily from once a day for past 3 weeks ( by his cardiologist dr. nixon and dr. Doan ) , pt's Cr is 7.45 today and usually runs upper 4's to 6 range. no other complaints at this point. 85 y/o male with multiple medical problems including chronic anemia, ckd stage 5 and not on dialysis ( as per family wishes ) came to the ER as his Hb was low, in the ER Hb 7.4 , runs Hb in 8 and 9 range. pt. has generalized weakness which appears to be chronic. pt. reports no cp, no change in his breathing status, no abd. pain, no blood per rectum, no cough, no fever. pt. is on Aranesp shot  and iron,  got his shot today, follows with hematologist dr. Baker. uses iron as well. has received blood transfusion before as well. As per daughter his torsemide has been increased to 20 mg po twice daily from once a day for past 3 weeks ( by his cardiologist dr. nixon and dr. Doan ) , pt's Cr is 7.45 today and usually runs upper 4's to 6 range. no other complaints at this point. pt. got one unit of prbc in the ER.

## 2020-06-23 NOTE — ED ADULT NURSE NOTE - CHIEF COMPLAINT QUOTE
Pt was sent from Dr. Baker for low Hemoglobin 7.7 , HX of blood transfusion in the past on Arinecpt + SOB Denies chest pain. No active bleeding noted

## 2020-06-23 NOTE — ED PROVIDER NOTE - CHPI ED SYMPTOMS NEG
no fever/no vomiting/no decreased eating/drinking/no chills/no pain/no back pain/no headache/no loss of consciousness/no nausea

## 2020-06-23 NOTE — ED ADULT NURSE NOTE - OBJECTIVE STATEMENT
Pt sent in by PMD for low HGB of 7.7 due to chronic anemia on Aranesp. Pt is to receive 1 unit PRBC in ED and re-evaluation. Pt states mild symptoms of SOB, and denies diff breathing, chest pain, weakness, lethargy. Lung sounds CTA. Pt offers no other complaints.

## 2020-06-23 NOTE — ED ADULT NURSE NOTE - NSIMPLEMENTINTERV_GEN_ALL_ED
Implemented All Fall with Harm Risk Interventions:  [place] to call system. Call bell, personal items and telephone within reach. Instruct patient to call for assistance. Room bathroom lighting operational. Non-slip footwear when patient is off stretcher. Physically safe environment: no spills, clutter or unnecessary equipment. Stretcher in lowest position, wheels locked, appropriate side rails in place. Provide visual cue, wrist band, yellow gown, etc. Monitor gait and stability. Monitor for mental status changes and reorient to person, place, and time. Review medications for side effects contributing to fall risk. Reinforce activity limits and safety measures with patient and family. Provide visual clues: red socks.

## 2020-06-23 NOTE — ED ADULT NURSE REASSESSMENT NOTE - NS ED NURSE REASSESS COMMENT FT1
Assumed pt care @ 1930. Pt sitting upright in stretcher in no apparent signs of distress. Pt remains on monitor, PIV site WNL with PRBC running. Pt status improved, states he's "starting to feel better." refer to flowsheet and chart, pt ID band in place, pt safety maintained, pt hemodynamically stable, updated on plan of care. Awaiting . Call light provided, fall safety reinforced. Will continue to monitor

## 2020-06-23 NOTE — ED PROVIDER NOTE - CARE PLAN
Principal Discharge DX:	Acute renal insufficiency  Secondary Diagnosis:	CKD (chronic kidney disease)  Secondary Diagnosis:	HTN (hypertension)  Secondary Diagnosis:	DM (diabetes mellitus)

## 2020-06-23 NOTE — ED ADULT NURSE REASSESSMENT NOTE - NS ED NURSE REASSESS COMMENT FT1
pt remains a&ox3, denies any pain/discomfort. ambulated to and from BR with stand by asst. blood transfusion initiated as rxd, tolerating well. no s/s adverse rx. pending completion for further tx plan. updated on plan of care, verbalize understanding. call bell in reach

## 2020-06-23 NOTE — ED PROVIDER NOTE - CLINICAL SUMMARY MEDICAL DECISION MAKING FREE TEXT BOX
The patient presents with SOB and fatigue and has symptomatic anemia but worsening of renal function and will admit for further evaluation

## 2020-06-23 NOTE — H&P ADULT - NSHPPHYSICALEXAM_GEN_ALL_CORE
General: An elderly  male lying in the bed in NAD.   HEENT: AT, NC. PERRL. intact EOM. no throat erythema or exudate.   Neck: supple. no JVD.  Chest: fair entry bilaterally, no sig. wheeze/ rales.   Heart: S1,S2. RRR. III/VI DEANA. no edema of ext.   Abdomen: soft. non-tender. non-distended. + BS.   rectal : deferred by pt.   Ext: no calf tenderness. moves all ext. independently.   Neuro: AAO x3. no focal weakness. no speech disorder. CNs intact.  Skin: no diaphoresis, mild skin pallor.   psych : co-operative, normal affect.

## 2020-06-23 NOTE — H&P ADULT - ASSESSMENT
pt. is admitted     - Anemia unspecified type, very likely multifactorial and is chronic, got one unit prbc, follow am cbc.     - CKD stage 5 not on dialysis, pt's Cr increased from baseline possible increased dose of torsemide contributing, will use 20 mg daily instead twice daily.     - Essential htn , continue home meds, imdur , procardia and clonidine.     - PVD recently had 06/09/20 RLE angiogram with balloon angioplasty of distal SFA using CO2 contrast, pt. has been started on cilostazol by his vascualr surgeon dr. Sorenson. pt's daughter wants to talk to vascualr surgeon in am before he is started back on cilostazol.     - AS, will call cardio [place] to follow.     - Elevated BG, will keep on Humalog scale.     - pt's daughter plans to take pt. home tomorrow after talking to nephrology, cardiology and am labs, requesting social work consult, home o2 ? pt. is admitted     - Anemia unspecified type, very likely multifactorial and is chronic, got one unit prbc, follow am cbc.     - CKD stage 5 not on dialysis, pt's Cr increased from baseline possible increased dose of torsemide contributing, will use 20 mg daily instead twice daily. nephrology consult dr. Goldstein, avoid nephrotoxic meds.     - Essential htn , continue home meds, imdur , procardia and clonidine.     - PVD recently had 06/09/20 RLE angiogram with balloon angioplasty of distal SFA using CO2 contrast, pt. has been started on cilostazol by his vascualr surgeon dr. Sorenson. pt's daughter wants to talk to vascualr surgeon in am before he is started back on cilostazol.     - AS, will call cardio [place] to follow, surgical candidate ?    - Elevated BG, will keep on Humalog scale.     - pt's daughter plans to take pt. home tomorrow after talking to nephrology, cardiology and am labs, requesting social work consult, home o2 ? pt. is admitted     - Anemia unspecified type, very likely multifactorial and is chronic, got one unit prbc, follow am cbc.     - CKD stage 5 not on dialysis, pt's Cr increased from baseline possible increased dose of torsemide contributing, will use 20 mg daily instead twice daily. nephrology consult dr. Goldstein, avoid nephrotoxic meds.     - Essential htn , continue home meds, imdur , procardia and clonidine.     - PVD recently had 06/09/20 RLE angiogram with balloon angioplasty of distal SFA using CO2 contrast, pt. has been started on cilostazol by his vascualr surgeon dr. Sorenson. pt's daughter wants to talk to vascualr surgeon in am before he is started back on cilostazol.     - AS, will call cardio [place] to follow, surgical candidate ? torsemide to 20 mg daily for now and cardio recommendations to follow.     - Elevated BG, will keep on Humalog scale.     - pt's daughter plans to take pt. home tomorrow after talking to nephrology, cardiology and am labs, requesting social work consult, home o2 ?

## 2020-06-23 NOTE — ED PROVIDER NOTE - OBJECTIVE STATEMENT
The patient is a 86 year old male presents with generalized weakness and sent in for transfusion for low H/H  No CP, No SOB, No abd pain, No motor No sensory loss

## 2020-06-24 DIAGNOSIS — N18.3 CHRONIC KIDNEY DISEASE, STAGE 3 (MODERATE): ICD-10-CM

## 2020-06-24 DIAGNOSIS — N18.5 CHRONIC KIDNEY DISEASE, STAGE 5: ICD-10-CM

## 2020-06-24 DIAGNOSIS — I10 ESSENTIAL (PRIMARY) HYPERTENSION: ICD-10-CM

## 2020-06-24 DIAGNOSIS — G93.49 OTHER ENCEPHALOPATHY: ICD-10-CM

## 2020-06-24 DIAGNOSIS — E11.21 TYPE 2 DIABETES MELLITUS WITH DIABETIC NEPHROPATHY: ICD-10-CM

## 2020-06-24 LAB
ALT FLD-CCNC: 65 U/L — HIGH
ANION GAP SERPL CALC-SCNC: 17 MMOL/L — SIGNIFICANT CHANGE UP (ref 5–17)
BUN SERPL-MCNC: 135 MG/DL — HIGH (ref 8–20)
CALCIUM SERPL-MCNC: 9.6 MG/DL — SIGNIFICANT CHANGE UP (ref 8.6–10.2)
CHLORIDE SERPL-SCNC: 96 MMOL/L — LOW (ref 98–107)
CO2 SERPL-SCNC: 23 MMOL/L — SIGNIFICANT CHANGE UP (ref 22–29)
CREAT SERPL-MCNC: 7.14 MG/DL — HIGH (ref 0.5–1.3)
GLUCOSE BLDC GLUCOMTR-MCNC: 133 MG/DL — HIGH (ref 70–99)
GLUCOSE BLDC GLUCOMTR-MCNC: 163 MG/DL — HIGH (ref 70–99)
GLUCOSE BLDC GLUCOMTR-MCNC: 213 MG/DL — HIGH (ref 70–99)
GLUCOSE BLDC GLUCOMTR-MCNC: 276 MG/DL — HIGH (ref 70–99)
GLUCOSE SERPL-MCNC: 129 MG/DL — HIGH (ref 70–99)
HCT VFR BLD CALC: 26.9 % — LOW (ref 39–50)
HGB BLD-MCNC: 8.5 G/DL — LOW (ref 13–17)
MCHC RBC-ENTMCNC: 29 PG — SIGNIFICANT CHANGE UP (ref 27–34)
MCHC RBC-ENTMCNC: 31.6 GM/DL — LOW (ref 32–36)
MCV RBC AUTO: 91.8 FL — SIGNIFICANT CHANGE UP (ref 80–100)
OB PNL STL: POSITIVE
PLATELET # BLD AUTO: 236 K/UL — SIGNIFICANT CHANGE UP (ref 150–400)
POTASSIUM SERPL-MCNC: 4.2 MMOL/L — SIGNIFICANT CHANGE UP (ref 3.5–5.3)
POTASSIUM SERPL-SCNC: 4.2 MMOL/L — SIGNIFICANT CHANGE UP (ref 3.5–5.3)
RBC # BLD: 2.93 M/UL — LOW (ref 4.2–5.8)
RBC # FLD: 15.4 % — HIGH (ref 10.3–14.5)
SODIUM SERPL-SCNC: 138 MMOL/L — SIGNIFICANT CHANGE UP (ref 135–145)
WBC # BLD: 8.66 K/UL — SIGNIFICANT CHANGE UP (ref 3.8–10.5)
WBC # FLD AUTO: 8.66 K/UL — SIGNIFICANT CHANGE UP (ref 3.8–10.5)

## 2020-06-24 PROCEDURE — 99233 SBSQ HOSP IP/OBS HIGH 50: CPT | Mod: 25

## 2020-06-24 PROCEDURE — 90937 HEMODIALYSIS REPEATED EVAL: CPT

## 2020-06-24 PROCEDURE — 36901 INTRO CATH DIALYSIS CIRCUIT: CPT | Mod: 59

## 2020-06-24 PROCEDURE — 99233 SBSQ HOSP IP/OBS HIGH 50: CPT

## 2020-06-24 PROCEDURE — 36902 INTRO CATH DIALYSIS CIRCUIT: CPT

## 2020-06-24 RX ORDER — NIFEDIPINE 30 MG
60 TABLET, EXTENDED RELEASE 24 HR ORAL AT BEDTIME
Refills: 0 | Status: DISCONTINUED | OUTPATIENT
Start: 2020-06-24 | End: 2020-06-27

## 2020-06-24 RX ORDER — ATORVASTATIN CALCIUM 80 MG/1
80 TABLET, FILM COATED ORAL AT BEDTIME
Refills: 0 | Status: DISCONTINUED | OUTPATIENT
Start: 2020-06-24 | End: 2020-06-27

## 2020-06-24 RX ORDER — NIFEDIPINE 30 MG
90 TABLET, EXTENDED RELEASE 24 HR ORAL
Refills: 0 | Status: DISCONTINUED | OUTPATIENT
Start: 2020-06-24 | End: 2020-06-27

## 2020-06-24 RX ADMIN — PANTOPRAZOLE SODIUM 40 MILLIGRAM(S): 20 TABLET, DELAYED RELEASE ORAL at 05:47

## 2020-06-24 RX ADMIN — HEPARIN SODIUM 5000 UNIT(S): 5000 INJECTION INTRAVENOUS; SUBCUTANEOUS at 22:02

## 2020-06-24 RX ADMIN — Medication 20 MILLIGRAM(S): at 05:47

## 2020-06-24 RX ADMIN — Medication 81 MILLIGRAM(S): at 12:15

## 2020-06-24 RX ADMIN — Medication 1 TABLET(S): at 12:15

## 2020-06-24 RX ADMIN — Medication 60 MILLIGRAM(S): at 22:02

## 2020-06-24 RX ADMIN — Medication 50 MILLIGRAM(S): at 13:49

## 2020-06-24 RX ADMIN — ISOSORBIDE MONONITRATE 30 MILLIGRAM(S): 60 TABLET, EXTENDED RELEASE ORAL at 12:15

## 2020-06-24 RX ADMIN — Medication 4: at 17:19

## 2020-06-24 RX ADMIN — Medication 325 MILLIGRAM(S): at 05:47

## 2020-06-24 RX ADMIN — CALCITRIOL 0.25 MICROGRAM(S): 0.5 CAPSULE ORAL at 12:15

## 2020-06-24 RX ADMIN — Medication 60 MILLIGRAM(S): at 05:47

## 2020-06-24 RX ADMIN — Medication 50 MILLIGRAM(S): at 22:02

## 2020-06-24 RX ADMIN — ATORVASTATIN CALCIUM 80 MILLIGRAM(S): 80 TABLET, FILM COATED ORAL at 22:02

## 2020-06-24 RX ADMIN — Medication 0.1 MILLIGRAM(S): at 22:02

## 2020-06-24 RX ADMIN — Medication 325 MILLIGRAM(S): at 22:02

## 2020-06-24 RX ADMIN — Medication 50 MILLIGRAM(S): at 05:47

## 2020-06-24 RX ADMIN — HEPARIN SODIUM 5000 UNIT(S): 5000 INJECTION INTRAVENOUS; SUBCUTANEOUS at 10:45

## 2020-06-24 NOTE — PROGRESS NOTE ADULT - SUBJECTIVE AND OBJECTIVE BOX
Patient is a 86y old  Male who presents with a chief complaint of low Hb (24 Jun 2020 12:20)    Patient seen and examined at bedside. extensive conversations had with patient daughter and nephro- probable HD start     ALLERGIES:  Plavix (Hives)  Toprol-XL (Rash)    MEDICATIONS  (STANDING):  aspirin enteric coated 81 milliGRAM(s) Oral daily  atorvastatin 80 milliGRAM(s) Oral at bedtime  calcitriol   Capsule 0.25 MICROGram(s) Oral daily  cloNIDine 0.1 milliGRAM(s) Oral at bedtime  dextrose 5%. 1000 milliLiter(s) (50 mL/Hr) IV Continuous <Continuous>  dextrose 50% Injectable 12.5 Gram(s) IV Push once  dextrose 50% Injectable 25 Gram(s) IV Push once  dextrose 50% Injectable 25 Gram(s) IV Push once  ferrous    sulfate 325 milliGRAM(s) Oral two times a day  heparin   Injectable 5000 Unit(s) SubCutaneous every 12 hours  hydrALAZINE 50 milliGRAM(s) Oral three times a day  insulin lispro (HumaLOG) corrective regimen sliding scale   SubCutaneous three times a day before meals  insulin lispro (HumaLOG) corrective regimen sliding scale   SubCutaneous at bedtime  isosorbide   mononitrate ER Tablet (IMDUR) 30 milliGRAM(s) Oral daily  Nephro-pito 1 Tablet(s) Oral daily  NIFEdipine XL 90 milliGRAM(s) Oral <User Schedule>  NIFEdipine XL 60 milliGRAM(s) Oral at bedtime  pantoprazole    Tablet 40 milliGRAM(s) Oral before breakfast  torsemide 20 milliGRAM(s) Oral daily    MEDICATIONS  (PRN):  dextrose 40% Gel 15 Gram(s) Oral once PRN Blood Glucose LESS THAN 70 milliGRAM(s)/deciliter  glucagon  Injectable 1 milliGRAM(s) IntraMuscular once PRN Glucose LESS THAN 70 milligrams/deciliter    Vital Signs Last 24 Hrs  T(F): 97.8 (24 Jun 2020 10:30), Max: 98.8 (23 Jun 2020 14:56)  HR: 90 (24 Jun 2020 10:30) (87 - 101)  BP: 152/62 (24 Jun 2020 10:30) (119/56 - 152/74)  RR: 16 (24 Jun 2020 10:30) (16 - 20)  SpO2: 94% (24 Jun 2020 10:30) (94% - 98%)  I&O's Summary    23 Jun 2020 07:01  -  24 Jun 2020 07:00  --------------------------------------------------------  IN: 240 mL / OUT: 200 mL / NET: 40 mL    PHYSICAL EXAM:  General: NAD, alert  ENT: MMM, no thrush  Neck: Supple, No JVD  Lungs: +crackles b/l lower lungs   Cardio: +s1/s2 +edema  Abdomen: Soft, Nontender, Nondistended; Bowel sounds present  Extremities: No calf tenderness    LABS:                        8.5    8.66  )-----------( 236      ( 24 Jun 2020 05:47 )             26.9     06-24    138  |  96  |  135.0  ----------------------------<  129  4.2   |  23.0  |  7.14    Ca    9.6      24 Jun 2020 05:47    TPro  6.3  /  Alb  3.6  /  TBili  0.3  /  DBili  x   /  AST  36  /  ALT  53  /  AlkPhos  82  06-23    eGFR if Non African American: 6 mL/min/1.73M2 (06-24-20 @ 05:47)  eGFR if : 7 mL/min/1.73M2 (06-24-20 @ 05:47)    Glucose  POCT Blood Glucose.: 276 mg/dL (24 Jun 2020 12:55)  POCT Blood Glucose.: 133 mg/dL (24 Jun 2020 08:36)  POCT Blood Glucose.: 202 mg/dL (23 Jun 2020 23:20)    RADIOLOGY & ADDITIONAL TESTS:  < from: Xray Chest 1 View AP/PA. (06.23.20 @ 16:13) >  IMPRESSION: Bilateral airspace opacities  < end of copied text >      Care Discussed with Consultants/Other Providers:   Vascular  Nephrology

## 2020-06-24 NOTE — PROGRESS NOTE ADULT - ASSESSMENT
86 year old male with a history of CKD stage 5, anemia on chronic disease and previous GI bleed status post colonoscopy with two ulcerated polyps s/p resections and colonic AVMs, EGD with gastritis, VPCs     #acute metabolic encephalopathy  - multifactorial chf, esrd (uremia), anemia  - monitor mental status   - hd per nephro     #acute on chronic congestive heart failure- diastolic   - torsemide  - cardio consult appreciated  - euvolemic   - lvef 65% 12/2019    #CAD/ PAD  - aspirin   - statin  - d/c pletal given chf     #CKD stage 5  - previously not on HD per family request now more agreeable to it  - vascular consult appreciated- dopplers and probable fistulogram permacath placement   - nephro consult appreciated  - monitor cr    - avoid nephrotoxic meds     #acute on Chronic diastolic CHF  - torsemide  - LVEF 65% 12/2019  - cardio consult appreciated     #Acute on chronic anemia   - likely secondary to underlying CKD  - s/p prbc  - Monitor Hb/hct  - on aranesp qw outpatient     #Hypertension  - monitor blood pressure   - imdur, hydralyzine, nifedipine, and clonidine      #DVT prophylaxis  - lovenox SC

## 2020-06-24 NOTE — CONSULT NOTE ADULT - SUBJECTIVE AND OBJECTIVE BOX
Patient is a 86y old  Male who presents with a chief complaint of low Hb (24 Jun 2020 13:15)    HPI:  85 y/o male with multiple medical problems including chronic anemia, ckd stage 5 and not on dialysis ( as per family wishes ) came to the ER as his Hb was low, in the ER Hb 7.4 , runs Hb in 8 and 9 range. pt. has generalized weakness which appears to be chronic. pt. reports no cp, no change in his breathing status, no abd. pain, no blood per rectum, no cough, no fever. pt. is on Aranesp shot  and iron,  got his shot today, follows with hematologist dr. Baker. uses iron as well. has received blood transfusion before as well. As per daughter his torsemide has been increased to 20 mg po twice daily from once a day for past 3 weeks ( by his cardiologist dr. nixon and dr. Doan ) , pt's Cr is 7.45 today and usually runs upper 4's to 6 range. no other complaints at this point. pt. got one unit of prbc in the ER. (23 Jun 2020 21:15)    PAST MEDICAL & SURGICAL HISTORY:    Risk factors for obstructive sleep apnea  Anemia  VT (ventricular tachycardia)  HTN (hypertension)  CAD (coronary artery disease)  CKD (chronic kidney disease): stage IV  AV block, 1st degree  DM (diabetes mellitus)    H/O carotid endarterectomy: Right  A-V fistula: left arm 5/2017  H/O angioplasty: 2013,  no  intervention  H/O left knee surgery  H/O circumcision: at  age  65    FAMILY HISTORY:  FH: type 2 diabetes  Family history of premature CAD  Family history of lung cancer (Grandparent)  Family history of cancer (Grandparent)    Social History: Lives w. Wife,     MEDICATIONS  (STANDING):  aspirin enteric coated 81 milliGRAM(s) Oral daily  atorvastatin 80 milliGRAM(s) Oral at bedtime  calcitriol   Capsule 0.25 MICROGram(s) Oral daily  cloNIDine 0.1 milliGRAM(s) Oral at bedtime  dextrose 5%. 1000 milliLiter(s) (50 mL/Hr) IV Continuous <Continuous>  dextrose 50% Injectable 12.5 Gram(s) IV Push once  dextrose 50% Injectable 25 Gram(s) IV Push once  dextrose 50% Injectable 25 Gram(s) IV Push once  ferrous    sulfate 325 milliGRAM(s) Oral two times a day  heparin   Injectable 5000 Unit(s) SubCutaneous every 12 hours  hydrALAZINE 50 milliGRAM(s) Oral three times a day  insulin lispro (HumaLOG) corrective regimen sliding scale   SubCutaneous three times a day before meals  insulin lispro (HumaLOG) corrective regimen sliding scale   SubCutaneous at bedtime  isosorbide   mononitrate ER Tablet (IMDUR) 30 milliGRAM(s) Oral daily  Nephro-pito 1 Tablet(s) Oral daily  NIFEdipine XL 90 milliGRAM(s) Oral <User Schedule>  NIFEdipine XL 60 milliGRAM(s) Oral at bedtime  pantoprazole    Tablet 40 milliGRAM(s) Oral before breakfast  torsemide 20 milliGRAM(s) Oral daily    MEDICATIONS  (PRN):  dextrose 40% Gel 15 Gram(s) Oral once PRN Blood Glucose LESS THAN 70 milliGRAM(s)/deciliter  glucagon  Injectable 1 milliGRAM(s) IntraMuscular once PRN Glucose LESS THAN 70 milligrams/deciliter      Allergies    Plavix (Hives)  Toprol-XL (Rash)    REVIEW OF SYSTEMS:    CONSTITUTIONAL: No fever, weight loss, +++ fatigue  EYES: No eye pain, visual disturbances, or discharge  ENMT:  No difficulty hearing, tinnitus, vertigo; No sinus or throat pain  NECK: No pain or stiffness  RESPIRATORY: No cough, wheezing, chills or hemoptysis; + shortness of breath  CARDIOVASCULAR: No chest pain, palpitations, dizziness, or leg swelling  GASTROINTESTINAL: No abdominal or epigastric pain. No nausea, vomiting, or hematemesis; No diarrhea or constipation. No melena or hematochezia.  GENITOURINARY: No dysuria, frequency, hematuria, or incontinence  NEUROLOGICAL: No headaches, memory loss, loss of strength, numbness, or tremors  SKIN: No itching, burning, rashes, or lesions   LYMPH NODES: No enlarged glands  ENDOCRINE: No heat or cold intolerance; No hair loss  MUSCULOSKELETAL: No joint pain or swelling; No muscle, back, or extremity pain  PSYCHIATRIC: + depression, anxiety,  difficulty sleeping  HEME/LYMPH: + easy bruising, No  bleeding gums  ALLERGY AND IMMUNOLOGIC: No hives or eczema    Vital Signs Last 24 Hrs  T(C): 36.6 (24 Jun 2020 10:30), Max: 37.1 (23 Jun 2020 14:56)  T(F): 97.8 (24 Jun 2020 10:30), Max: 98.8 (23 Jun 2020 14:56)  HR: 86 (24 Jun 2020 13:40) (86 - 101)  BP: 142/65 (24 Jun 2020 13:40) (119/56 - 152/74)  RR: 16 (24 Jun 2020 10:30) (16 - 20)  SpO2: 94% (24 Jun 2020 10:30) (94% - 98%)    PHYSICAL EXAM:    GENERAL: NAD, Pale, Sallow,   HEAD:  Atraumatic, Normocephalic  EYES: EOMI, PERRLA, conjunctiva and sclera clear  ENMT: Moist mucous membranes,   NECK: Supple, + JVD,   NERVOUS SYSTEM:  Lethargic, Confused ,    CHEST/LUNG: Dull  to percussion bilaterally; No rales, rhonchi, wheezing, or rubs  HEART: Regular rate and rhythm;  3/6 DEANA, No  rubs, or gallops  ABDOMEN: Soft, Nontender, distended; Bowel sounds present  EXTREMITIES:  2+ Peripheral Pulses, No clubbing, cyanosis, + edema  LYMPH: No lymphadenopathy noted  SKIN: No rashes or lesions    LABS:                        8.5    8.66  )-----------( 236      ( 24 Jun 2020 05:47 )             26.9     06-24    138  |  96<L>  |  135.0<H>  ----------------------------<  129<H>  4.2   |  23.0  |  7.14<H>    Ca    9.6      24 Jun 2020 05:47    TPro  x   /  Alb  x   /  TBili  x   /  DBili  x   /  AST  x   /  ALT  65<H>  /  AlkPhos  x   06-24    RADIOLOGY & ADDITIONAL TESTS:    Xray Chest 1 View-PORTABLE IMMEDIATE (06.23.20 @ 20:29)    EXAM:  XR CHEST PORTABLE IMMED 1V                          PROCEDURE DATE:  06/23/2020      INTERPRETATION:  CLINICAL INFORMATION: Cough.    EXAM: AP view of chest performed on 6/23/2020 2020 5:00 PM    COMPARISON: PA and lateral views of chest from 6/8/2020.    FINDINGS:    Increased interstitial markings and prominent pulmonary vasculature again suggesting pulmonary edema. Trace amount of fluid is now seen in the right minor fissure. No gross pleural effusion appreciated. There is no pneumothorax.  The heart is likely enlarged despite projection. Cardiac loop recorder again noted.  The osseous structures are unremarkable.    IMPRESSION:    Pulmonary edema and trace right pleural effusion suggesting congestive heart failure.    DWAYNE COLEMAN M.D., ATTENDING RADIOLOGIST  This document has been electronically signed. Jun 24 2020  5:13AM

## 2020-06-24 NOTE — PROGRESS NOTE ADULT - SUBJECTIVE AND OBJECTIVE BOX
Patient is a 86y old  Male who presents with a chief complaint of low Hb (24 Jun 2020 10:01)  Asked to see pt in followup as pt is well known to vascular surgery service with h/o PAD and ESRD. Pt had L AVF created 4 years ago but has not yet required HD  He is admitted now with severe anemia  Pt with worsening renal status and HD is being recommended.  At this time, pt and family are willing to consider RRT.  PT reports he is feeling well     PAST MEDICAL & SURGICAL HISTORY:  Risk factors for obstructive sleep apnea  Anemia  RICHARDS (dyspnea on exertion)  VT (ventricular tachycardia)  HTN (hypertension)  CAD (coronary artery disease)  CKD (chronic kidney disease): stage IV  Arrhythmia  AV block, 1st degree  DM (diabetes mellitus)  H/O carotid endarterectomy: Right  A-V fistula: left arm 5/2017  H/O angioplasty: 2013,  no  intervention  H/O left knee surgery  H/O circumcision: at  age  65    MEDICATIONS  (STANDING):  aspirin enteric coated 81 milliGRAM(s) Oral daily  atorvastatin 80 milliGRAM(s) Oral at bedtime  calcitriol   Capsule 0.25 MICROGram(s) Oral daily  cloNIDine 0.1 milliGRAM(s) Oral at bedtime  dextrose 5%. 1000 milliLiter(s) (50 mL/Hr) IV Continuous <Continuous>  dextrose 50% Injectable 12.5 Gram(s) IV Push once  dextrose 50% Injectable 25 Gram(s) IV Push once  dextrose 50% Injectable 25 Gram(s) IV Push once  ferrous    sulfate 325 milliGRAM(s) Oral two times a day  heparin   Injectable 5000 Unit(s) SubCutaneous every 12 hours  hydrALAZINE 50 milliGRAM(s) Oral three times a day  insulin lispro (HumaLOG) corrective regimen sliding scale   SubCutaneous three times a day before meals  insulin lispro (HumaLOG) corrective regimen sliding scale   SubCutaneous at bedtime  isosorbide   mononitrate ER Tablet (IMDUR) 30 milliGRAM(s) Oral daily  Nephro-pito 1 Tablet(s) Oral daily  NIFEdipine XL 90 milliGRAM(s) Oral <User Schedule>  NIFEdipine XL 60 milliGRAM(s) Oral at bedtime  pantoprazole    Tablet 40 milliGRAM(s) Oral before breakfast  torsemide 20 milliGRAM(s) Oral daily    MEDICATIONS  (PRN):  dextrose 40% Gel 15 Gram(s) Oral once PRN Blood Glucose LESS THAN 70 milliGRAM(s)/deciliter  glucagon  Injectable 1 milliGRAM(s) IntraMuscular once PRN Glucose LESS THAN 70 milligrams/deciliter      Allergies  Plavix (Hives)  Toprol-XL (Rash)      Vital Signs Last 24 Hrs  T(C): 36.6 (24 Jun 2020 10:30), Max: 37.1 (23 Jun 2020 14:56)  T(F): 97.8 (24 Jun 2020 10:30), Max: 98.8 (23 Jun 2020 14:56)  HR: 90 (24 Jun 2020 10:30) (87 - 101)  BP: 152/62 (24 Jun 2020 10:30) (119/56 - 152/74)  BP(mean): --  RR: 16 (24 Jun 2020 10:30) (16 - 20)  SpO2: 94% (24 Jun 2020 10:30) (94% - 98%)  I&O's Detail    23 Jun 2020 07:01  -  24 Jun 2020 07:00  --------------------------------------------------------  IN:    Oral Fluid: 240 mL  Total IN: 240 mL    OUT:    Voided: 200 mL  Total OUT: 200 mL    Total NET: 40 mL      Physical Exam:  General: NAD, OOB in chair  Pulmonary: Nonlabored breathing, no respiratory distress  Cardiovascular: Normal S1, S2  Abdominal: soft, NT/ND  Extremities: L AVF with focal area of pulsatile flow, + bruit, +thrill. Focal area od dialted vein. + radial. L hand WWP, motor and sensory intact      LABS:                        8.5    8.66  )-----------( 236      ( 24 Jun 2020 05:47 )             26.9     06-24    138  |  96<L>  |  135.0<H>  ----------------------------<  129<H>  4.2   |  23.0  |  7.14<H>    Ca    9.6      24 Jun 2020 05:47    TPro  6.3<L>  /  Alb  3.6  /  TBili  0.3<L>  /  DBili  x   /  AST  36  /  ALT  53<H>  /  AlkPhos  82  06-23      CAPILLARY BLOOD GLUCOSE  POCT Blood Glucose.: 133 mg/dL (24 Jun 2020 08:36)  POCT Blood Glucose.: 202 mg/dL (23 Jun 2020 23:20)    Radiology and Additional Studies:    Assessment and Plan: 86y Male well known to vascular surgery with h/o PAD and L AVF which has never been used.  Pt made NPO for poss fistulagram and poss permcath placement today  Discussed at length with pt and daughter  Discussed with Dr Avendano and Dr Barbara Sorenson

## 2020-06-24 NOTE — CONSULT NOTE ADULT - SUBJECTIVE AND OBJECTIVE BOX
[facility], THE HEART CENTER                                   [address] [place] [address]                                                      PHONE: [phone]                                                         FAX: [phone]  [email or web address]  ---------------------------------------------------------------------------------------------------------------------------------    Reason for Consult: CHF  CVS: Masciello  HPI:  CHRISTIANO ELIZABETH is an 86y Male PMHx Nonobstructive CAD, HTN, HLD, VT, HFpEF, Moderate AS by MRI, ESRD no currently on HD, PAD sp recent balloon angioplasty of right SFA, ILR pw several days of severe/progressive/constant fatigue and lethargy without exertion found to be anemic.  The patient also had torsemide increased for the past 3 weeks and now has worsening renal function.      PAST MEDICAL & SURGICAL HISTORY:  Risk factors for obstructive sleep apnea   Anemia  RICHARDS (dyspnea on exertion)  VT (ventricular tachycardia)  HTN (hypertension)  CAD (coronary artery disease)  CKD (chronic kidney disease): stage IV  Arrhythmia  AV block, 1st degree  DM (diabetes mellitus)  H/O carotid endarterectomy: Right  A-V fistula: left arm 5/2017  H/O angioplasty: 2013,  no  intervention  H/O left knee surgery  H/O circumcision: at  age  65      Plavix (Hives)  Toprol-XL (Rash)      MEDICATIONS  (STANDING):  aspirin enteric coated 81 milliGRAM(s) Oral daily  atorvastatin 40 milliGRAM(s) Oral at bedtime  calcitriol   Capsule 0.25 MICROGram(s) Oral daily  cloNIDine 0.1 milliGRAM(s) Oral at bedtime  dextrose 5%. 1000 milliLiter(s) (50 mL/Hr) IV Continuous <Continuous>  dextrose 50% Injectable 12.5 Gram(s) IV Push once  dextrose 50% Injectable 25 Gram(s) IV Push once  dextrose 50% Injectable 25 Gram(s) IV Push once  ferrous    sulfate 325 milliGRAM(s) Oral two times a day  heparin   Injectable 5000 Unit(s) SubCutaneous every 12 hours  hydrALAZINE 50 milliGRAM(s) Oral three times a day  insulin lispro (HumaLOG) corrective regimen sliding scale   SubCutaneous three times a day before meals  insulin lispro (HumaLOG) corrective regimen sliding scale   SubCutaneous at bedtime  isosorbide   mononitrate ER Tablet (IMDUR) 30 milliGRAM(s) Oral daily  Nephro-pito 1 Tablet(s) Oral daily  NIFEdipine XL 60 milliGRAM(s) Oral daily  pantoprazole    Tablet 40 milliGRAM(s) Oral before breakfast  torsemide 20 milliGRAM(s) Oral daily    MEDICATIONS  (PRN):  dextrose 40% Gel 15 Gram(s) Oral once PRN Blood Glucose LESS THAN 70 milliGRAM(s)/deciliter  glucagon  Injectable 1 milliGRAM(s) IntraMuscular once PRN Glucose LESS THAN 70 milligrams/deciliter      Social History:  Cigarettes:       no             Alchohol:      no           Illicit Drug Abuse:  no  Fhx no SCD  ROS: Negative other than as mentioned in HPI. no CP, palps or fevers or chills    Vital Signs Last 24 Hrs  T(C): 36.6 (24 Jun 2020 05:39), Max: 37.1 (23 Jun 2020 14:56)  T(F): 97.9 (24 Jun 2020 05:39), Max: 98.8 (23 Jun 2020 14:56)  HR: 89 (24 Jun 2020 05:39) (87 - 101)  BP: 150/70 (24 Jun 2020 05:39) (119/56 - 152/74)  BP(mean): --  RR: 16 (24 Jun 2020 05:39) (16 - 20)  SpO2: 96% (24 Jun 2020 05:39) (94% - 98%)  ICU Vital Signs Last 24 Hrs  CHRISTIANO ELIZABETH  I&O's Detail    23 Jun 2020 07:01  -  24 Jun 2020 07:00  --------------------------------------------------------  IN:    Oral Fluid: 240 mL  Total IN: 240 mL    OUT:    Voided: 200 mL  Total OUT: 200 mL    Total NET: 40 mL        I&O's Summary    23 Jun 2020 07:01  -  24 Jun 2020 07:00  --------------------------------------------------------  IN: 240 mL / OUT: 200 mL / NET: 40 mL      Drug Dosing Weight  CHRISTIANO ELIZABETH      PHYSICAL EXAM:  General: Appears well developed, well nourished alert and cooperative.  HEENT: Head; normocephalic, atraumatic.  Eyes: Pupils reactive, cornea wnl.  Neck: Supple, no nodes adenopathy, no NVD or carotid bruit or thyromegaly.  CARDIOVASCULAR: 3/6 systolic murmur, rub, gallop or lift.   LUNGS: trace  rales   ABDOMEN: Soft, nontender without mass or organomegaly. bowel sounds normoactive.  EXTREMITIES: No clubbing, cyanosis or edema. Distal pulses wnl.   SKIN: warm and dry with normal turgor.  NEURO: Alert/oriented x 3/normal motor exam. No pathologic reflexes.    PSYCH: normal affect.        LABS:                        8.5    8.66  )-----------( 236      ( 24 Jun 2020 05:47 )             26.9     06-24    138  |  96<L>  |  135.0<H>  ----------------------------<  129<H>  4.2   |  23.0  |  7.14<H>    Ca    9.6      24 Jun 2020 05:47    TPro  6.3<L>  /  Alb  3.6  /  TBili  0.3<L>  /  DBili  x   /  AST  36  /  ALT  53<H>  /  AlkPhos  82  06-23    CHRISTIANO ELIZABETH            RADIOLOGY & ADDITIONAL STUDIES:    INTERPRETATION OF TELEMETRY (personally reviewed): no events    ECG: NS @ 96 LVH no acute ischemic changes    ECHO: < from: TTE Echo Complete w/Doppler (12.18.19 @ 20:19) >    Summary:   1. Left ventricular ejection fraction, by visual estimation, is 65 to 70%.   2. Normal global left ventricular systolic function.   3. LV Ejection Fraction by Reynoso's Method with a biplane EF of 71 %.   4. Mildly increased LV wall thickness.   5. Normal left ventricular internal cavity size.   6. Spectral Doppler shows impaired relaxation pattern of left ventricular myocardial filling (Grade I diastolicdysfunction).   7. There is mild concentric left ventricular hypertrophy.   8. Moderately enlarged left atrium.   9. Degenerative mitral valve.  10. Mild to moderate mitral valve regurgitation.  11. Moderate mitral annular calcification.  12. Moderate thickening and calcification of the anterior and posterior mitral valve leaflets.  13. Mild to moderate aortic valve stenosis.  14. Estimated pulmonary artery systolic pressure is 54.4 mmHg assuming a right atrial pressure of 15 mmHg, which is consistent with moderate pulmonary hypertension.  15. Mitral valve mean gradient is 7.0 mmHg consistent with moderate mitral stenosis.  16. The aortic valve mean gradient is 15.5 mmHg consistent with mild aortic stenosis.    MD Shashi Electronically signed on 12/19/2019 at 11:42:02 AM    < end of copied text >           CARDIAC CATHETERIZATION: < from: Cardiac Cath Lab - Adult (02.03.17 @ 12:41) >  CORONARY VESSELS: The coronary circulation is right dominant.  LM:   --  LM: Normal.  LAD:   --  Proximal LAD: There was a 40 % stenosis.  --  Mid LAD: There was a 30 % stenosis.  --  Distal LAD: Angiography showed minor luminal irregularities withno  flow limiting lesions.  CX:   --  Mid circumflex: There was a 50 % stenosis.  --  OM1: There was a 30 % stenosis at the ostium of the vessel segment.  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  --  Mid RCA: There was a 70 % stenosis.  COMPLICATIONS: There were no complications. No complications occurred  during the cath lab visit.  DIAGNOSTIC IMPRESSIONS: Probably significant RCA disease with unusual take  off with non obstructive CAD of LCX and LAD  DIAGNOSTIC RECOMMENDATIONS: Nuclear PET SCAN. Possible future PCI The  patient should continue with the present medications.  INTERVENTIONAL IMPRESSIONS: Probably significant RCA disease with unusual  take off with non obstructive CAD of LCX and LAD  INTERVENTIONAL RECOMMENDATIONS: Nuclear PET SCAN. Possible future PCI  Prepared and signed by  Star Manriquez MD  Signed 02/03/2017 13:11:27    < end of copied text >      Assessment and Plan:  In summary, CHRISTIANO ELIZABETH is an 86y Male with past medical history significant for Nonobstructive CAD, HTN, HLD, VT, HFpEF, Moderate AS by MRI, ESRD no currently on HD, PAD sp recent balloon angioplasty of right SFA, ILR pw several days of severe/progressive/constant fatigue and lethargy without exertion found to be anemic.  The patient also had torsemide increased for the past 3 weeks and now has worsening renal function.      1) ARACELI on ESRD     -Appreciate renal followup regarding diuretics and fluids.  Pt with mild crackles at bases but overall appear euvolemic thus not pressing need to urgently diurese    -Concern for possible need for dialysis in the future    2) CAD cw ASA, lipitor    3) HTN -cw clonidine, procardia, hydralazine, imdur    4) No pletal as contraindicated in CHF and no clear need at this time\    5) Tele monitoring

## 2020-06-24 NOTE — PROGRESS NOTE ADULT - SUBJECTIVE AND OBJECTIVE BOX
Post-op Check    Subjective:  Pt offers no acute complaints at this time. Pain well controlled on current regiment. Denies chest pain, SOB, palpitations. Patient currently receiving dialysis, per diaylsis RN " Going a little slow but working well"     STATUS POST: Fistulogram, arteriovenous, with vein angioplasty, operative findings  Stenosis of AVF, POBA with return of palpable thrill    POST OPERATIVE DAY #: 0    MEDICATIONS  (STANDING):  aspirin enteric coated 81 milliGRAM(s) Oral daily  atorvastatin 80 milliGRAM(s) Oral at bedtime  calcitriol   Capsule 0.25 MICROGram(s) Oral daily  cloNIDine 0.1 milliGRAM(s) Oral at bedtime  dextrose 5%. 1000 milliLiter(s) (50 mL/Hr) IV Continuous <Continuous>  dextrose 50% Injectable 12.5 Gram(s) IV Push once  dextrose 50% Injectable 25 Gram(s) IV Push once  dextrose 50% Injectable 25 Gram(s) IV Push once  ferrous    sulfate 325 milliGRAM(s) Oral two times a day  heparin   Injectable 5000 Unit(s) SubCutaneous every 12 hours  hydrALAZINE 50 milliGRAM(s) Oral three times a day  insulin lispro (HumaLOG) corrective regimen sliding scale   SubCutaneous three times a day before meals  insulin lispro (HumaLOG) corrective regimen sliding scale   SubCutaneous at bedtime  isosorbide   mononitrate ER Tablet (IMDUR) 30 milliGRAM(s) Oral daily  Nephro-pito 1 Tablet(s) Oral daily  NIFEdipine XL 90 milliGRAM(s) Oral <User Schedule>  NIFEdipine XL 60 milliGRAM(s) Oral at bedtime  pantoprazole    Tablet 40 milliGRAM(s) Oral before breakfast  torsemide 20 milliGRAM(s) Oral daily    MEDICATIONS  (PRN):  dextrose 40% Gel 15 Gram(s) Oral once PRN Blood Glucose LESS THAN 70 milliGRAM(s)/deciliter  glucagon  Injectable 1 milliGRAM(s) IntraMuscular once PRN Glucose LESS THAN 70 milligrams/deciliter      Vital Signs Last 24 Hrs  T(C): 36.8 (24 Jun 2020 20:10), Max: 36.8 (24 Jun 2020 20:10)  T(F): 98.2 (24 Jun 2020 20:10), Max: 98.2 (24 Jun 2020 20:10)  HR: 92 (24 Jun 2020 20:10) (86 - 94)  BP: 136/- (24 Jun 2020 20:10) (136/- - 161/61)  BP(mean): --  RR: 18 (24 Jun 2020 20:10) (16 - 18)  SpO2: 95% (24 Jun 2020 20:10) (94% - 97%)    Physical Exam:    Constitutional: NAD  HEENT: PERRL, EOMI  Neck: No JVD, FROM without pain  Respiratory: no accessory muscle use, respirations non-labored  GI: abdomen soft, non-tender, atraumatic   vascular: s/p fistulogram of LUE AV fistula, no issues with dialysis, NVI  Neurological: A&O x 3; without gross deficit    A:     P:  Continue current care  Pain control  OOB as tolerated  Encourage IS  DVT ppx  monitor dialysis tolerance  monitor nv status of lue  monitor thrill of fistula

## 2020-06-24 NOTE — CONSULT NOTE ADULT - ASSESSMENT
In summary, CHRISTIANO ELIZABETH is an 86y Male with past medical history significant for Nonobstructive CAD, HTN, HLD, VT, HFpEF, Moderate AS by MRI, ESRD no currently on HD, PAD sp recent balloon angioplasty of right SFA, ILR pw several days of severe/progressive/constant fatigue and lethargy without exertion found to be anemic.  The patient also had torsemide increased for the past 3 weeks and now has worsening renal function.      1) ESRD    2) CAD - ASA, lipitor    3) HTN -On  clonidine, procardia, hydralazine, imdur    Fistuogram, +/- TDC if needed,    Initiate HD,    D/W Marilu Orr. Dr. Davenport & The daughter ( Vansesa )

## 2020-06-25 ENCOUNTER — TRANSCRIPTION ENCOUNTER (OUTPATIENT)
Age: 85
End: 2020-06-25

## 2020-06-25 DIAGNOSIS — R19.5 OTHER FECAL ABNORMALITIES: ICD-10-CM

## 2020-06-25 DIAGNOSIS — D64.9 ANEMIA, UNSPECIFIED: ICD-10-CM

## 2020-06-25 DIAGNOSIS — K63.5 POLYP OF COLON: ICD-10-CM

## 2020-06-25 LAB
ALBUMIN SERPL ELPH-MCNC: 3.7 G/DL — SIGNIFICANT CHANGE UP (ref 3.3–5.2)
ANION GAP SERPL CALC-SCNC: 15 MMOL/L — SIGNIFICANT CHANGE UP (ref 5–17)
ANION GAP SERPL CALC-SCNC: 17 MMOL/L — SIGNIFICANT CHANGE UP (ref 5–17)
BUN SERPL-MCNC: 85 MG/DL — HIGH (ref 8–20)
BUN SERPL-MCNC: 89 MG/DL — HIGH (ref 8–20)
CALCIUM SERPL-MCNC: 9.2 MG/DL — SIGNIFICANT CHANGE UP (ref 8.6–10.2)
CALCIUM SERPL-MCNC: 9.2 MG/DL — SIGNIFICANT CHANGE UP (ref 8.6–10.2)
CHLORIDE SERPL-SCNC: 98 MMOL/L — SIGNIFICANT CHANGE UP (ref 98–107)
CHLORIDE SERPL-SCNC: 98 MMOL/L — SIGNIFICANT CHANGE UP (ref 98–107)
CO2 SERPL-SCNC: 23 MMOL/L — SIGNIFICANT CHANGE UP (ref 22–29)
CO2 SERPL-SCNC: 25 MMOL/L — SIGNIFICANT CHANGE UP (ref 22–29)
CREAT SERPL-MCNC: 5.08 MG/DL — HIGH (ref 0.5–1.3)
CREAT SERPL-MCNC: 5.2 MG/DL — HIGH (ref 0.5–1.3)
GLUCOSE BLDC GLUCOMTR-MCNC: 144 MG/DL — HIGH (ref 70–99)
GLUCOSE BLDC GLUCOMTR-MCNC: 231 MG/DL — HIGH (ref 70–99)
GLUCOSE BLDC GLUCOMTR-MCNC: 90 MG/DL — SIGNIFICANT CHANGE UP (ref 70–99)
GLUCOSE BLDC GLUCOMTR-MCNC: 99 MG/DL — SIGNIFICANT CHANGE UP (ref 70–99)
GLUCOSE SERPL-MCNC: 223 MG/DL — HIGH (ref 70–99)
GLUCOSE SERPL-MCNC: 229 MG/DL — HIGH (ref 70–99)
HAV IGM SER-ACNC: SIGNIFICANT CHANGE UP
HBV CORE AB SER-ACNC: SIGNIFICANT CHANGE UP
HBV CORE IGM SER-ACNC: SIGNIFICANT CHANGE UP
HBV SURFACE AB SER-ACNC: <3 MIU/ML — LOW
HBV SURFACE AG SER-ACNC: SIGNIFICANT CHANGE UP
HCT VFR BLD CALC: 32.9 % — LOW (ref 39–50)
HCV AB S/CO SERPL IA: 0.11 S/CO — SIGNIFICANT CHANGE UP (ref 0–0.99)
HCV AB SERPL-IMP: SIGNIFICANT CHANGE UP
HGB BLD-MCNC: 10.3 G/DL — LOW (ref 13–17)
INR BLD: 1.03 RATIO — SIGNIFICANT CHANGE UP (ref 0.88–1.16)
MCHC RBC-ENTMCNC: 29.2 PG — SIGNIFICANT CHANGE UP (ref 27–34)
MCHC RBC-ENTMCNC: 31.3 GM/DL — LOW (ref 32–36)
MCV RBC AUTO: 93.2 FL — SIGNIFICANT CHANGE UP (ref 80–100)
PHOSPHATE SERPL-MCNC: 3.6 MG/DL — SIGNIFICANT CHANGE UP (ref 2.4–4.7)
PLATELET # BLD AUTO: 260 K/UL — SIGNIFICANT CHANGE UP (ref 150–400)
POTASSIUM SERPL-MCNC: 4.1 MMOL/L — SIGNIFICANT CHANGE UP (ref 3.5–5.3)
POTASSIUM SERPL-MCNC: 4.1 MMOL/L — SIGNIFICANT CHANGE UP (ref 3.5–5.3)
POTASSIUM SERPL-SCNC: 4.1 MMOL/L — SIGNIFICANT CHANGE UP (ref 3.5–5.3)
POTASSIUM SERPL-SCNC: 4.1 MMOL/L — SIGNIFICANT CHANGE UP (ref 3.5–5.3)
PROTHROM AB SERPL-ACNC: 11.7 SEC — SIGNIFICANT CHANGE UP (ref 10–12.9)
RBC # BLD: 3.53 M/UL — LOW (ref 4.2–5.8)
RBC # FLD: 15.5 % — HIGH (ref 10.3–14.5)
SODIUM SERPL-SCNC: 138 MMOL/L — SIGNIFICANT CHANGE UP (ref 135–145)
SODIUM SERPL-SCNC: 138 MMOL/L — SIGNIFICANT CHANGE UP (ref 135–145)
WBC # BLD: 9.23 K/UL — SIGNIFICANT CHANGE UP (ref 3.8–10.5)
WBC # FLD AUTO: 9.23 K/UL — SIGNIFICANT CHANGE UP (ref 3.8–10.5)

## 2020-06-25 PROCEDURE — 90937 HEMODIALYSIS REPEATED EVAL: CPT

## 2020-06-25 PROCEDURE — 99223 1ST HOSP IP/OBS HIGH 75: CPT

## 2020-06-25 PROCEDURE — 99233 SBSQ HOSP IP/OBS HIGH 50: CPT

## 2020-06-25 RX ORDER — IRON SUCROSE 20 MG/ML
50 INJECTION, SOLUTION INTRAVENOUS
Refills: 0 | Status: DISCONTINUED | OUTPATIENT
Start: 2020-06-25 | End: 2020-06-27

## 2020-06-25 RX ORDER — SOD SULF/SODIUM/NAHCO3/KCL/PEG
1000 SOLUTION, RECONSTITUTED, ORAL ORAL
Refills: 0 | Status: DISCONTINUED | OUTPATIENT
Start: 2020-06-25 | End: 2020-06-25

## 2020-06-25 RX ORDER — TUBERCULIN PURIFIED PROTEIN DERIVATIVE 5 [IU]/.1ML
5 INJECTION, SOLUTION INTRADERMAL ONCE
Refills: 0 | Status: COMPLETED | OUTPATIENT
Start: 2020-06-25 | End: 2020-06-25

## 2020-06-25 RX ORDER — SOD SULF/SODIUM/NAHCO3/KCL/PEG
4000 SOLUTION, RECONSTITUTED, ORAL ORAL ONCE
Refills: 0 | Status: COMPLETED | OUTPATIENT
Start: 2020-06-25 | End: 2020-06-25

## 2020-06-25 RX ADMIN — Medication 4000 MILLILITER(S): at 20:00

## 2020-06-25 RX ADMIN — Medication 4: at 13:01

## 2020-06-25 RX ADMIN — ATORVASTATIN CALCIUM 80 MILLIGRAM(S): 80 TABLET, FILM COATED ORAL at 22:05

## 2020-06-25 RX ADMIN — Medication 1 TABLET(S): at 13:00

## 2020-06-25 RX ADMIN — Medication 90 MILLIGRAM(S): at 05:24

## 2020-06-25 RX ADMIN — TUBERCULIN PURIFIED PROTEIN DERIVATIVE 5 UNIT(S): 5 INJECTION, SOLUTION INTRADERMAL at 22:07

## 2020-06-25 RX ADMIN — Medication 50 MILLIGRAM(S): at 22:05

## 2020-06-25 RX ADMIN — Medication 81 MILLIGRAM(S): at 13:00

## 2020-06-25 RX ADMIN — PANTOPRAZOLE SODIUM 40 MILLIGRAM(S): 20 TABLET, DELAYED RELEASE ORAL at 05:24

## 2020-06-25 RX ADMIN — Medication 20 MILLIGRAM(S): at 05:24

## 2020-06-25 RX ADMIN — Medication 10 MILLIGRAM(S): at 13:09

## 2020-06-25 RX ADMIN — ISOSORBIDE MONONITRATE 30 MILLIGRAM(S): 60 TABLET, EXTENDED RELEASE ORAL at 13:00

## 2020-06-25 RX ADMIN — CALCITRIOL 0.25 MICROGRAM(S): 0.5 CAPSULE ORAL at 13:00

## 2020-06-25 RX ADMIN — Medication 60 MILLIGRAM(S): at 22:06

## 2020-06-25 RX ADMIN — Medication 50 MILLIGRAM(S): at 13:01

## 2020-06-25 RX ADMIN — IRON SUCROSE 50 MILLIGRAM(S): 20 INJECTION, SOLUTION INTRAVENOUS at 17:27

## 2020-06-25 RX ADMIN — Medication 50 MILLIGRAM(S): at 05:24

## 2020-06-25 RX ADMIN — Medication 325 MILLIGRAM(S): at 05:24

## 2020-06-25 NOTE — CONSULT NOTE ADULT - PROBLEM SELECTOR RECOMMENDATION 2
most likely multifactorial ad patient has a history of CKD stage 5   , on Aranesp, and found to have AVMs on colonoscopy (11/19) most likely multifactorial as patient has a history of CKD stage 5   , on Aranesp, and found to have AVMs on colonoscopy (11/19). Suggest increase dose of aranesp and consider IV iron infusions. Can also consider capsule endo as outpatient.

## 2020-06-25 NOTE — DIETITIAN INITIAL EVALUATION ADULT. - MALNUTRITION
Limited NFPE performed- moderate muscle wasting at clavicle, shoulder.  Mild muscle wasting at temples.  Moderate fat loss in orbital region. moderate (chronic)

## 2020-06-25 NOTE — PROGRESS NOTE ADULT - SUBJECTIVE AND OBJECTIVE BOX
Patient was seen and evaluated on dialysis.   No c/o CP SOB NV  no F/C  no swelling    T(C): 37.1 (06-25-20 @ 10:12), Max: 37.1 (06-25-20 @ 10:12)  HR: 96 (06-25-20 @ 13:07) (88 - 98)  BP: 143/56 (06-25-20 @ 13:07) (133/77 - 161/61)    PE ;  NAD, Pale, Sallow,   lungs - CTA  CV gr 1 murmur,  No gallop or rub  Abd : soft, NT BS +, No masses  Ext- No edema  Neuro : Grossly intact, moving extremities                        10.3   9.23  )-----------( 260      ( 25 Jun 2020 10:45 )             32.9     06-25    138  |  98  |  85.0<H>  ----------------------------<  223<H>  4.1   |  25.0  |  5.08<H>    Ca    9.2      25 Jun 2020 10:45  Phos  3.6     06-25    TPro  x   /  Alb  3.7  /  TBili  x   /  DBili  x   /  AST  x   /  ALT  x   /  AlkPhos  x   06-25    MEDICATIONS  (STANDING):  aspirin enteric coated  atorvastatin  calcitriol   Capsule  dextrose 40% Gel PRN  dextrose 5%.  dextrose 50% Injectable  dextrose 50% Injectable  dextrose 50% Injectable  glucagon  Injectable PRN  hydrALAZINE  insulin lispro (HumaLOG) corrective regimen sliding scale  insulin lispro (HumaLOG) corrective regimen sliding scale  iron sucrose Injectable  isosorbide   mononitrate ER Tablet (IMDUR)  Nephro-pito  NIFEdipine XL  NIFEdipine XL  pantoprazole    Tablet  polyethylene glycol/electrolyte Solution.  PPD  5 Tuberculin Unit(s) Injectable    Patient stable  Josh HD easily  Continue

## 2020-06-25 NOTE — DIETITIAN INITIAL EVALUATION ADULT. - ETIOLOGY
related to inability to consume increased protein energy intake in setting of advanced age, chronic anemia and ESRD (new to HD),

## 2020-06-25 NOTE — PROGRESS NOTE ADULT - SUBJECTIVE AND OBJECTIVE BOX
[facility], THE HEART CENTER                                   [address] [place] [address]                                                      PHONE: [phone]                                                         FAX: [phone]  [email or web address]  ---------------------------------------------------------------------------------------------------------------------------------    Overnight events/patient complaints:  PT feels much better    Plavix (Hives)  Toprol-XL (Rash)    MEDICATIONS  (STANDING):  aspirin enteric coated 81 milliGRAM(s) Oral daily  atorvastatin 80 milliGRAM(s) Oral at bedtime  calcitriol   Capsule 0.25 MICROGram(s) Oral daily  cloNIDine 0.1 milliGRAM(s) Oral at bedtime  dextrose 5%. 1000 milliLiter(s) (50 mL/Hr) IV Continuous <Continuous>  dextrose 50% Injectable 12.5 Gram(s) IV Push once  dextrose 50% Injectable 25 Gram(s) IV Push once  dextrose 50% Injectable 25 Gram(s) IV Push once  ferrous    sulfate 325 milliGRAM(s) Oral two times a day  heparin   Injectable 5000 Unit(s) SubCutaneous every 12 hours  hydrALAZINE 50 milliGRAM(s) Oral three times a day  insulin lispro (HumaLOG) corrective regimen sliding scale   SubCutaneous three times a day before meals  insulin lispro (HumaLOG) corrective regimen sliding scale   SubCutaneous at bedtime  isosorbide   mononitrate ER Tablet (IMDUR) 30 milliGRAM(s) Oral daily  Nephro-pito 1 Tablet(s) Oral daily  NIFEdipine XL 90 milliGRAM(s) Oral <User Schedule>  NIFEdipine XL 60 milliGRAM(s) Oral at bedtime  pantoprazole    Tablet 40 milliGRAM(s) Oral before breakfast  torsemide 20 milliGRAM(s) Oral daily    MEDICATIONS  (PRN):  dextrose 40% Gel 15 Gram(s) Oral once PRN Blood Glucose LESS THAN 70 milliGRAM(s)/deciliter  glucagon  Injectable 1 milliGRAM(s) IntraMuscular once PRN Glucose LESS THAN 70 milligrams/deciliter      Vital Signs Last 24 Hrs  T(C): 36.7 (25 Jun 2020 05:27), Max: 36.9 (24 Jun 2020 22:09)  T(F): 98.1 (25 Jun 2020 05:27), Max: 98.4 (24 Jun 2020 22:09)  HR: 88 (25 Jun 2020 05:27) (86 - 98)  BP: 140/69 (25 Jun 2020 05:27) (136/- - 161/61)  BP(mean): --  RR: 18 (25 Jun 2020 05:27) (16 - 18)  SpO2: 92% (25 Jun 2020 05:27) (92% - 96%)  ICU Vital Signs Last 24 Hrs  CHRISTIANO ELIZABETH  I&O's Detail    24 Jun 2020 07:01  -  25 Jun 2020 07:00  --------------------------------------------------------  IN:    Oral Fluid: 480 mL  Total IN: 480 mL    OUT:    Other: 500 mL  Total OUT: 500 mL    Total NET: -20 mL        Drug Dosing Weight  CHRISTIANO ELIZABETH      PHYSICAL EXAM:  General: Appears well developed, well nourished alert and cooperative.  HEENT: Head; normocephalic, atraumatic.  Eyes: Pupils reactive, cornea wnl.  Neck: Supple, no nodes adenopathy, no NVD or carotid bruit or thyromegaly.  CARDIOVASCULAR: 2/6 systolic murmur, rub, gallop or lift.   LUNGS: No rales, rhonchi or wheeze. Normal breath sounds bilaterally.  ABDOMEN: Soft, nontender without mass or organomegaly. bowel sounds normoactive.  EXTREMITIES: No clubbing, cyanosis or edema. Distal pulses wnl.   SKIN: warm and dry with normal turgor.  NEURO: Alert/oriented x 3/normal motor exam. No pathologic reflexes.    PSYCH: normal affect.        LABS:                        8.5    8.66  )-----------( 236      ( 24 Jun 2020 05:47 )             26.9     06-24    138  |  96<L>  |  135.0<H>  ----------------------------<  129<H>  4.2   |  23.0  |  7.14<H>    Ca    9.6      24 Jun 2020 05:47    TPro  x   /  Alb  x   /  TBili  x   /  DBili  x   /  AST  x   /  ALT  65<H>  /  AlkPhos  x   06-24    CHRISTIANO ELIZABETH            RADIOLOGY & ADDITIONAL STUDIES:    INTERPRETATION OF TELEMETRY (personally reviewed): no events, PVCs       ECHO: < from: TTE Echo Complete w/Doppler (12.18.19 @ 20:19) >    Summary:   1. Left ventricular ejection fraction, by visual estimation, is 65 to 70%.   2. Normal global left ventricular systolic function.   3. LV Ejection Fraction by Reynoso's Method with a biplane EF of 71 %.   4. Mildly increased LV wall thickness.   5. Normal left ventricular internal cavity size.   6. Spectral Doppler shows impaired relaxation pattern of left ventricular myocardial filling (Grade I diastolicdysfunction).   7. There is mild concentric left ventricular hypertrophy.   8. Moderately enlarged left atrium.   9. Degenerative mitral valve.  10. Mild to moderate mitral valve regurgitation.  11. Moderate mitral annular calcification.  12. Moderate thickening and calcification of the anterior and posterior mitral valve leaflets.  13. Mild to moderate aortic valve stenosis.  14. Estimated pulmonary artery systolic pressure is 54.4 mmHg assuming a right atrial pressure of 15 mmHg, which is consistent with moderate pulmonary hypertension.  15. Mitral valve mean gradient is 7.0 mmHg consistent with moderate mitral stenosis.  16. The aortic valve mean gradient is 15.5 mmHg consistent with mild aortic stenosis.    MD Shashi Electronically signed on 12/19/2019 at 11:42:02 AM    < end of copied text >           CARDIAC CATHETERIZATION: < from: Cardiac Cath Lab - Adult (06.09.20 @ 08:19) >  ROCEDURE:  --  Right leg angiography.  --  Sonosite.  --  Sheath Exchange for Intervention.  --  Hemostasis with Mynx-Intervention.  --  Intervention on right superficial femoral: balloon angioplasty.  Local anesthetic given. Left femoral artery access. Sheath exchange. The  sheath in the left femoral artery was exchanged. Right leg angiography. A  catheter was positioned. Sonosite. RADIATION EXPOSURE: 21.7 min. Aballoon  angioplasty was performed on the lesion in the right superficial femoral  artery. There was no dissection. Balloon angioplasty was performed, using  a ASTRID MONORAIL 5.0MM X 40MM X 135CM balloon, with 4 inflations and a  maximum inflationpressure of 12 mariusz. During the procedure, a new 260CM  ADVANTAGE GLIDEWIRE wire was advanced across the lesion. Sheath Exchange  for Intervention. Hemostasis with Mynx-Intervention.  MEDICATIONS GIVEN: Midazolam, 0.5 mg, IV. Fentanyl, 25 mcg, IV. Midazolam,  0.5 mg, IV. Fentanyl, 25 mcg, IV. Heparin, 4000 units, IV. Aspirin, 81 mg,  PO. 1% Lidocaine, 10 ml, subcutaneously. 0.9NS, 50 ml, IV.  RIGHT LOWER EXTREMITY VESSELS: Right superficial femoral:  COMPLICATIONS: No complications occurred duringthe cath lab visit.  Prepared and signed by  Siva Albrecht MD  Signed 06/15/2020 20:07:20    < end of copied text >      CARDIAC CATHETERIZATION: < from: Cardiac Cath Lab - Adult (02.03.17 @ 12:41) >  CORONARY VESSELS: The coronary circulation is right dominant.  LM:   --  LM: Normal.  LAD:   --  Proximal LAD: There was a 40 % stenosis.  --  Mid LAD: There was a 30 % stenosis.  --  Distal LAD: Angiography showed minor luminal irregularities withno  flow limiting lesions.  CX:   --  Mid circumflex: There was a 50 % stenosis.  --  OM1: There was a 30 % stenosis at the ostium of the vessel segment.  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  --  Mid RCA: There was a 70 % stenosis.  COMPLICATIONS: There were no complications. No complications occurred  during the cath lab visit.  DIAGNOSTIC IMPRESSIONS: Probably significant RCA disease with unusual take  off with non obstructive CAD of LCX and LAD  DIAGNOSTIC RECOMMENDATIONS: Nuclear PET SCAN. Possible future PCI The  patient should continue with the present medications.  INTERVENTIONAL IMPRESSIONS: Probably significant RCA disease with unusual  take off with non obstructive CAD of LCX and LAD  INTERVENTIONAL RECOMMENDATIONS: Nuclear PET SCAN. Possible future PCI  Prepared and signed by  Star Manriquez MD  Signed 02/03/2017 13:11:27    < end of copied text >      Assessment and Plan:  In summary, CHRISTIANO ELIZABETH is an 86y Male with past medical history significant for Nonobstructive CAD, HTN, HLD, VT, HFpEF, Moderate AS by MRI, ESRD no currently on HD, PAD sp recent balloon angioplasty of right SFA, ILR pw several days of severe/progressive/constant fatigue and lethargy without exertion found to be anemic, with worsening renal function. Pt is not sp initiation of HD.    1) ESRD on HD     -cw HD today      -Nephrology to decide on final dosage or need for diuretics    2) CAD cw ASA, lipitor    3) HTN -cw clonidine, procardia, hydralazine, imdur    4) Will follow    5) Anemia-GI will be consulted

## 2020-06-25 NOTE — CONSULT NOTE ADULT - PROBLEM SELECTOR RECOMMENDATION 9
Plan for elective colonoscopy prior to discharge as soon as patient gets clearance by cardiology and renal.  Please obtain Cardiac clearance for plan colonoscopy  Please obtain Renal clearance for plan colonoscopy  Monitor CBC daily  PPI twice a day for cytoprotection Of questionable significance in this chronically ill patient on multiple meds and in florid renal failure

## 2020-06-25 NOTE — PROGRESS NOTE ADULT - ASSESSMENT
86 year old male with a history of CKD stage 5, anemia on chronic disease and previous GI bleed status post colonoscopy with two ulcerated polyps s/p resections and colonic AVMs,     EGD with gastritis, VPCs     #acute metabolic encephalopathy- improved    #acute on chronic congestive heart failure- diastolic   -May D.C  torsemide  -EF  65% 12/2019    #CAD/ PAD  - aspirin   - statin    #ESRD on new HD start     #Acute on chronic anemia   - likely secondary to underlying CKD    On AMY,     #positive occult blood in patient w/ anemia  -  #Hypertension  -Monitor blood pressure   -On  Imdur Hydralazine nifedipine, and  D.C clonidine

## 2020-06-25 NOTE — CONSULT NOTE ADULT - PROBLEM SELECTOR PROBLEM 3
Type 2 diabetes mellitus with diabetic nephropathy, unspecified whether long term insulin use
Colon polyps

## 2020-06-25 NOTE — PROGRESS NOTE ADULT - SUBJECTIVE AND OBJECTIVE BOX
Patient is a 86y old  Male who presents with a chief complaint of low Hb (24 Jun 2020 20:36)    Pt is S/P LAV fistulagram/angioplasty of cephalic vein                                    POD#  1  Had HD via L AVF yesterday. Tolerated well.  Pt without complaints    Vital Signs Last 24 Hrs  T(C): 36.7 (25 Jun 2020 05:27), Max: 36.9 (24 Jun 2020 22:09)  T(F): 98.1 (25 Jun 2020 05:27), Max: 98.4 (24 Jun 2020 22:09)  HR: 88 (25 Jun 2020 05:27) (86 - 98)  BP: 140/69 (25 Jun 2020 05:27) (136/- - 161/61)  BP(mean): --  RR: 18 (25 Jun 2020 05:27) (16 - 18)  SpO2: 92% (25 Jun 2020 05:27) (92% - 96%)  I&O's Detail    24 Jun 2020 07:01  -  25 Jun 2020 07:00  --------------------------------------------------------  IN:    Oral Fluid: 480 mL  Total IN: 480 mL    OUT:    Other: 500 mL  Total OUT: 500 mL    Total NET: -20 mL      PAST MEDICAL & SURGICAL HISTORY:  Risk factors for obstructive sleep apnea  Anemia  RICHARDS (dyspnea on exertion)  VT (ventricular tachycardia)  HTN (hypertension)  CAD (coronary artery disease)  CKD (chronic kidney disease): stage IV  Arrhythmia  AV block, 1st degree  DM (diabetes mellitus)  H/O carotid endarterectomy: Right  A-V fistula: left arm 5/2017  H/O angioplasty: 2013,  no  intervention  H/O left knee surgery  H/O circumcision: at  age  65    MEDICATIONS  (STANDING):  aspirin enteric coated 81 milliGRAM(s) Oral daily  atorvastatin 80 milliGRAM(s) Oral at bedtime  calcitriol   Capsule 0.25 MICROGram(s) Oral daily  cloNIDine 0.1 milliGRAM(s) Oral at bedtime  dextrose 5%. 1000 milliLiter(s) (50 mL/Hr) IV Continuous <Continuous>  dextrose 50% Injectable 12.5 Gram(s) IV Push once  dextrose 50% Injectable 25 Gram(s) IV Push once  dextrose 50% Injectable 25 Gram(s) IV Push once  ferrous    sulfate 325 milliGRAM(s) Oral two times a day  heparin   Injectable 5000 Unit(s) SubCutaneous every 12 hours  hydrALAZINE 50 milliGRAM(s) Oral three times a day  insulin lispro (HumaLOG) corrective regimen sliding scale   SubCutaneous three times a day before meals  insulin lispro (HumaLOG) corrective regimen sliding scale   SubCutaneous at bedtime  isosorbide   mononitrate ER Tablet (IMDUR) 30 milliGRAM(s) Oral daily  Nephro-pito 1 Tablet(s) Oral daily  NIFEdipine XL 90 milliGRAM(s) Oral <User Schedule>  NIFEdipine XL 60 milliGRAM(s) Oral at bedtime  pantoprazole    Tablet 40 milliGRAM(s) Oral before breakfast  torsemide 20 milliGRAM(s) Oral daily    MEDICATIONS  (PRN):  dextrose 40% Gel 15 Gram(s) Oral once PRN Blood Glucose LESS THAN 70 milliGRAM(s)/deciliter  glucagon  Injectable 1 milliGRAM(s) IntraMuscular once PRN Glucose LESS THAN 70 milligrams/deciliter      Physical Exam:  General: NAD, resting comfortably in bed  Extremities: L AVF + bruit, + thrill, L hand WWP, motor and sensory intact      LABS:                        8.5    8.66  )-----------( 236      ( 24 Jun 2020 05:47 )             26.9     06-24    138  |  96<L>  |  135.0<H>  ----------------------------<  129<H>  4.2   |  23.0  |  7.14<H>    Ca    9.6      24 Jun 2020 05:47    TPro  x   /  Alb  x   /  TBili  x   /  DBili  x   /  AST  x   /  ALT  65<H>  /  AlkPhos  x   06-24      CAPILLARY BLOOD GLUCOSE  POCT Blood Glucose.: 163 mg/dL (24 Jun 2020 21:57)  POCT Blood Glucose.: 213 mg/dL (24 Jun 2020 17:12)  POCT Blood Glucose.: 276 mg/dL (24 Jun 2020 12:55)  POCT Blood Glucose.: 133 mg/dL (24 Jun 2020 08:36)      Radiology and Additional Studies:    Assessment:[id number]:  85 y/o male with multiple medical problems including chronic anemia, ckd stage 5 and not on dialysis ( as per family wishes ) came to the ER as his Hb was low, in the ER Hb 7.4 , runs Hb in 8 and 9 range. pt. has generalized weakness which appears to be chronic. pt. reports no cp, no change in his breathing status, no abd. pain, no blood per rectum, no cough, no fever. pt. is on Aranesp shot  and iron,  got his shot today, follows with hematologist dr. Baker. uses iron as well. has received blood transfusion before as well. As per daughter his torsemide has been increased to 20 mg po twice daily from once a day for past 3 weeks ( by his cardiologist dr. nixon and dr. Doan ) , pt's Cr is 7.45 today and usually runs upper 4's to 6 range. no other complaints at this point. pt. got one unit of prbc in the ER. (23 Jun 2020 21:15)   S/P LAV fistulagram/angioplasty of cephalic vein    POD#  1  No issues with first HD     Plan:  Cont to use L AVF for HD  HD per renal  No further vascular interventions at this time  Will sign off. Reconsult prn

## 2020-06-25 NOTE — PROGRESS NOTE ADULT - SUBJECTIVE AND OBJECTIVE BOX
Patient is a 86y old  Male who presents with a chief complaint of low Hb (25 Jun 2020 08:59)      Patient seen and examined at bedside.      ALLERGIES:  Plavix (Hives)  Toprol-XL (Rash)    MEDICATIONS  (STANDING):  aspirin enteric coated 81 milliGRAM(s) Oral daily  atorvastatin 80 milliGRAM(s) Oral at bedtime  calcitriol   Capsule 0.25 MICROGram(s) Oral daily  cloNIDine 0.1 milliGRAM(s) Oral at bedtime  dextrose 5%. 1000 milliLiter(s) (50 mL/Hr) IV Continuous <Continuous>  dextrose 50% Injectable 12.5 Gram(s) IV Push once  dextrose 50% Injectable 25 Gram(s) IV Push once  dextrose 50% Injectable 25 Gram(s) IV Push once  ferrous    sulfate 325 milliGRAM(s) Oral two times a day  heparin   Injectable 5000 Unit(s) SubCutaneous every 12 hours  hydrALAZINE 50 milliGRAM(s) Oral three times a day  insulin lispro (HumaLOG) corrective regimen sliding scale   SubCutaneous three times a day before meals  insulin lispro (HumaLOG) corrective regimen sliding scale   SubCutaneous at bedtime  isosorbide   mononitrate ER Tablet (IMDUR) 30 milliGRAM(s) Oral daily  Nephro-pito 1 Tablet(s) Oral daily  NIFEdipine XL 90 milliGRAM(s) Oral <User Schedule>  NIFEdipine XL 60 milliGRAM(s) Oral at bedtime  pantoprazole    Tablet 40 milliGRAM(s) Oral before breakfast  PPD  5 Tuberculin Unit(s) Injectable 5 Unit(s) IntraDermal once  torsemide 20 milliGRAM(s) Oral daily    MEDICATIONS  (PRN):  dextrose 40% Gel 15 Gram(s) Oral once PRN Blood Glucose LESS THAN 70 milliGRAM(s)/deciliter  glucagon  Injectable 1 milliGRAM(s) IntraMuscular once PRN Glucose LESS THAN 70 milligrams/deciliter    Vital Signs Last 24 Hrs  T(F): 98.1 (25 Jun 2020 05:27), Max: 98.4 (24 Jun 2020 22:09)  HR: 88 (25 Jun 2020 05:27) (86 - 98)  BP: 140/69 (25 Jun 2020 05:27) (136/- - 161/61)  RR: 18 (25 Jun 2020 05:27) (16 - 18)  SpO2: 92% (25 Jun 2020 05:27) (92% - 96%)  I&O's Summary    24 Jun 2020 07:01  -  25 Jun 2020 07:00  --------------------------------------------------------  IN: 480 mL / OUT: 500 mL / NET: -20 mL    PHYSICAL EXAM:  General: NAD, alert  ENT: MMM, no thrush  Neck: Supple, No JVD  Lungs: clear to ascultation b/l; no wheezing   Cardio: +s1/s2 no edema b/l le   Abdomen: Soft, Nontender, Nondistended; Bowel sounds present  Extremities: No calf tenderness      LABS:                        8.5    8.66  )-----------( 236      ( 24 Jun 2020 05:47 )             26.9     06-24    138  |  96  |  135.0  ----------------------------<  129  4.2   |  23.0  |  7.14    Ca    9.6      24 Jun 2020 05:47    TPro  x   /  Alb  x   /  TBili  x   /  DBili  x   /  AST  x   /  ALT  65  /  AlkPhos  x   06-24    eGFR if Non African American: 6 mL/min/1.73M2 (06-24-20 @ 05:47)  eGFR if : 7 mL/min/1.73M2 (06-24-20 @ 05:47)    Glucose  POCT Blood Glucose.: 144 mg/dL (25 Jun 2020 08:40)  POCT Blood Glucose.: 163 mg/dL (24 Jun 2020 21:57)  POCT Blood Glucose.: 213 mg/dL (24 Jun 2020 17:12)  POCT Blood Glucose.: 276 mg/dL (24 Jun 2020 12:55)    RADIOLOGY & ADDITIONAL TESTS:  < from: Xray Chest 1 View-PORTABLE IMMEDIATE (06.23.20 @ 20:29) >  IMPRESSION:  Pulmonary edema and trace right pleural effusion suggesting congestive heart failure.  < end of copied text >    < from: Xray Chest 1 View AP/PA. (06.23.20 @ 16:13) >  IMPRESSION: Bilateral airspace opacities  < end of copied text >    Care Discussed with Consultants/Other Providers:   Cardio

## 2020-06-25 NOTE — CONSULT NOTE ADULT - PROBLEM SELECTOR RECOMMENDATION 3
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 He will need cardiac clearance and further sessions of HD prior with "nephrology clearance." as the patient had a large sessile polyp in the ascending colon with high grade dysplasia I would suggest f/u colonoscopy in AM. Needs cardiac clearance.

## 2020-06-25 NOTE — DIETITIAN INITIAL EVALUATION ADULT. - OTHER INFO
Pt with h/o CKD stage 5, anemia on chronic disease and previous GI bleed status post colonoscopy with two ulcerated polyps s/p resections and colonic AVMs, EGD with gastritis, VPCs.  Pt also with ESRD and new to HD.  Pt reports decreased po intake at meals due to poor appetite.  Pt states good po intake prior to admission, denies wt loss and follows low Na meal plan at home.  Pt states UBW 155lbs.  Discussed importance of consuming adequate protein intake at meals to optimize nutrition status.  Also reviewed dialysis meal plan.  Pt very receptive and verbalized understanding.  Nutrition literature provided.

## 2020-06-25 NOTE — PROGRESS NOTE ADULT - ASSESSMENT
85 y/o male with multiple medical problems including chronic anemia, ckd stage 5 and not on dialysis ( as per family wishes ) came to the ER as his Hb was low, in the ER Hb 7.4 , runs Hb in 8 and 9 range. pt. has generalized weakness which appears to be chronic. pt. reports no cp, no change in his breathing status, no abd. pain, no blood per rectum, no cough, no fever. pt. is on Aranesp shot  and iron,  got his shot today, follows with hematologist dr. Baker. uses iron as well. has received blood transfusion before as well. As per daughter his torsemide has been increased to 20 mg po twice daily from once a day for past 3 weeks ( by his cardiologist dr. nixon and dr. Doan ) , pt's Cr is 7.45 today and usually runs upper 4's to 6 range. no other complaints at this point. pt. got one unit of prbc in the ER. (23 Jun 2020 21:15)     S/P LAV fistulogram / angioplasty of cephalic vein      POD#  1,    No issues with first HD     Plan:    Cont to use L AVF for HD

## 2020-06-25 NOTE — DIETITIAN INITIAL EVALUATION ADULT. - DIET TYPE
renal replacement pts:no protein restr,no conc K & phos, low sodium/consistent carbohydrate (evening snack)/RX: Nepro BID RX: Nepro BID/consistent carbohydrate (evening snack)/DASH/TLC (sodium and cholesterol restricted diet)

## 2020-06-25 NOTE — PROGRESS NOTE ADULT - SUBJECTIVE AND OBJECTIVE BOX
There are no contraindications to EGD/Colonoscopy from a cardiovascular standpoint.  Would proceed to this low risk procedure without further cardiovascular workup.

## 2020-06-25 NOTE — PROGRESS NOTE ADULT - SUBJECTIVE AND OBJECTIVE BOX
Patient was seen and evaluated on dialysis.   No c/o CP SOB NV  no F/C  no swelling    T(C): 36.7 (06-25-20 @ 05:27), Max: 36.9 (06-24-20 @ 22:09)  HR: 88 (06-25-20 @ 05:27) (86 - 98)  BP: 140/69 (06-25-20 @ 05:27) (136/- - 161/61)  Wt(kg): -- NA,     PE ;  NAD, Pale,   lungs - CTA  CV gr 1 murmur,  No gallop or rub  Abd : soft, NT BS +, No masses  Ext- No edema  Neuro : Grossly intact, moving extremities                        8.5    8.66  )-----------( 236      ( 24 Jun 2020 05:47 )             26.9      06-24    138  |  96<L>  |  135.0<H>  ----------------------------<  129<H>  4.2   |  23.0  |  7.14<H>    Ca    9.6      24 Jun 2020 05:47    ALT  65<H>  /  AlkPhos  x   06-24    MEDICATIONS  (STANDING):  aspirin enteric coated  atorvastatin  calcitriol   Capsule  cloNIDine  dextrose 40% Gel PRN  dextrose 5%.  dextrose 50% Injectable  dextrose 50% Injectable  dextrose 50% Injectable  ferrous    sulfate  glucagon  Injectable PRN  heparin   Injectable  hydrALAZINE  insulin lispro (HumaLOG) corrective regimen sliding scale  insulin lispro (HumaLOG) corrective regimen sliding scale  isosorbide   mononitrate ER Tablet (IMDUR)  Nephro-pito  NIFEdipine XL  NIFEdipine XL  pantoprazole    Tablet  PPD  5 Tuberculin Unit(s) Injectable  torsemide      Patient stable  Josh HD easily  Continue

## 2020-06-25 NOTE — DIETITIAN INITIAL EVALUATION ADULT. - SIGNS/SYMPTOMS
as evidenced by pt with mild/mod muscle wasting/mild fat loss, likely meeting <75% est energy intake

## 2020-06-25 NOTE — DIETITIAN INITIAL EVALUATION ADULT. - PERTINENT LABORATORY DATA
06-25 Na138 mmol/L Glu 223 mg/dL<H> K+ 4.1 mmol/L Cr  5.08 mg/dL<H> BUN 85.0 mg/dL<H> Phos 3.6 mg/dL Alb 3.7 g/dL PAB n/a

## 2020-06-25 NOTE — CHART NOTE - NSCHARTNOTEFT_GEN_A_CORE
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        [ ] Severe Protein Calorie Malnutrition        [ ] Unspecified Protein Calorie Malnutrition        [ ] Underweight / BMI <19        [ ] Morbid Obesity / BMI > 40      Findings as based on:  •  Comprehensive nutrition assessment and consultation  •  Calorie counts (nutrient intake analysis)  •  Food acceptance and intake status from observations by staff  •  Follow up  •  Patient education  •  Intervention secondary to interdisciplinary rounds  •   concerns      Treatment:    The following diet has been recommended:  Resume DASH/TLC, cons cho diet with Nepro BID    PROVIDER Section:     By signing this assessment you are acknowledging and agree with the diagnosis/diagnoses assigned by the Registered Dietitian    Comments:

## 2020-06-25 NOTE — PROGRESS NOTE ADULT - ASSESSMENT
86 year old male with a history of CKD stage 5, anemia on chronic disease and previous GI bleed status post colonoscopy with two ulcerated polyps s/p resections and colonic AVMs, EGD with gastritis, VPCs     #acute metabolic encephalopathy- improved  - multifactorial chf, esrd (uremia), anemia  - monitor mental status   - hd per nephro     #acute on chronic congestive heart failure- diastolic   - torsemide  - cardio consult appreciated  - euvolemic   - lvef 65% 12/2019    #CAD/ PAD  - aspirin   - statin  - d/c pletal given chf     #ESRD on new HD start   - HD per nephro started 6/24  - vascular consult appreciated    - nephro consult appreciated  - monitor cr    - avoid nephrotoxic meds   - nutritionist consult    #generalized weakness  - PT consult pending     #acute on Chronic diastolic CHF  - torsemide  - LVEF 65% 12/2019  - cardio consult appreciated     #Acute on chronic anemia   - likely secondary to underlying CKD  - s/p prbc  - Monitor Hb/hct  - on aranesp qw outpatient     #positive occult blood  - gi consult pending   - in patient with hx of polyps and ?gi bleed  - monitor h and h     #Hypertension  - monitor blood pressure   - imdur, hydralyzine, nifedipine, and clonidine      #DVT prophylaxis  - lovenox SC 86 year old male with a history of CKD stage 5, anemia on chronic disease and previous GI bleed status post colonoscopy with two ulcerated polyps s/p resections and colonic AVMs, EGD with gastritis, VPCs     #acute metabolic encephalopathy- improved  - multifactorial chf, esrd (uremia), anemia  - monitor mental status   - hd per nephro     #acute on chronic congestive heart failure- diastolic   - torsemide  - cardio consult appreciated  - euvolemic   - lvef 65% 12/2019    #CAD/ PAD  - aspirin   - statin  - d/c pletal given chf     #ESRD on new HD start   - HD per nephro started 6/24  - vascular consult appreciated    - nephro consult appreciated  - monitor cr    - avoid nephrotoxic meds   - nutritionist consult    #generalized weakness  - PT consult pending     #acute on Chronic diastolic CHF  - torsemide  - LVEF 65% 12/2019  - cardio consult appreciated     #Acute on chronic anemia   - likely secondary to underlying CKD  - s/p prbc  - Monitor Hb/hct  - on aranesp qw outpatient     #positive occult blood in patient w/ anemia  - gi consult pending   - in patient with hx of polyps and ?gi bleed  - monitor h and h     #Hypertension  - monitor blood pressure   - imdur, hydralyzine, nifedipine, and clonidine      #DVT prophylaxis  - lovenox SC

## 2020-06-25 NOTE — CONSULT NOTE ADULT - SUBJECTIVE AND OBJECTIVE BOX
HISTORY OF PRESENT ILLNESS: This is a 86y old man with a past medical history significant for CKD not on HD, anemia of chronic disease, arrhythmia, HTN, CAD, DM, presented to ER for low Hemoglobin, fatigue and restlessness for past few days.. Patient had recent EGD (11/19) with gastritis and colonoscopy (11/19) with polypectomy of several small tubular adenomas removed and treatment of AVM's. Patient denies nausea, vomiting,  abdominal pain, shortness of breath, chest pain.  Left forearm AV fistula in placed (06/24) , patient to be dialyse today.    PAST MEDICAL/SURGICAL HISTORY:  Risk factors for obstructive sleep apnea  Anemia  RICHARDS (dyspnea on exertion)  VT (ventricular tachycardia)  HTN (hypertension)  CAD (coronary artery disease)  CKD (chronic kidney disease): stage IV  Arrhythmia  AV block, 1st degree  DM (diabetes mellitus)  H/O carotid endarterectomy: Right  A-V fistula: left arm 5/2017  H/O angioplasty: 2013,  no  intervention  H/O left knee surgery  H/O circumcision: at  age  65    SOCIAL HISTORY:  - TOBACCO: Denies  - ALCOHOL: Denies  - ILLICIT DRUG USE: Denies    FAMILY HISTORY:  No known history of gastrointestinal or liver disease;  FH: type 2 diabetes  Family history of premature CAD  Family history of lung cancer (Grandparent)  Family history of cancer (Grandparent)      HOME MEDICATIONS:  Aranesp 100 mcg/0.5 mL injectable solution: 0.5 milliliter(s) injectable every 2 weeks (08 Jun 2020 11:56)  aspirin 81 mg oral delayed release tablet: 1 tab(s) orally once a day (08 Jun 2020 11:56)  calcitriol 0.25 mcg oral capsule: 1 cap(s) orally once a day (08 Jun 2020 11:56)  cilostazol 100 mg oral tablet: 1 tab(s) orally 2 times a day (08 Jun 2020 11:56)  cloNIDine 0.1 mg oral tablet: 1 tab(s) orally once a day in the evening (08 Jun 2020 11:56)  ferrous sulfate 325 mg (65 mg elemental iron) oral delayed release tablet: 1 tab(s) orally 3 times a day (08 Jun 2020 11:56)  hydrALAZINE 50 mg oral tablet: 1 tab(s) orally 3 times a day (08 Jun 2020 11:56)  isosorbide mononitrate 30 mg oral tablet, extended release: 1 tab(s) orally once a day (at bedtime) 10PM (08 Jun 2020 11:56)  Nephro-Pito oral tablet: 1 tab(s) orally once a day (08 Jun 2020 11:56)  NIFEdipine 60 mg oral tablet, extended release: 1 tab(s) orally once a day (at bedtime) (08 Jun 2020 11:56)  NIFEdipine 90 mg oral tablet, extended release: 1 tab(s) orally once a day in the morning (08 Jun 2020 11:56)  pantoprazole 40 mg oral delayed release tablet: 1 tab(s) orally 3 times a day, As Needed (08 Jun 2020 11:56)  torsemide 20 mg oral tablet: 1 tab(s) orally 2 times a day (08 Jun 2020 11:56)  Vitamin C 250 mg oral tablet: 1 tab(s) orally once a day (08 Jun 2020 11:56)  Vitamin D3 50,000 intl units oral capsule: 1 cap(s) orally once a week (08 Jun 2020 11:56)    INPATIENT MEDICATIONS:  MEDICATIONS  (STANDING):  aspirin enteric coated 81 milliGRAM(s) Oral daily  atorvastatin 80 milliGRAM(s) Oral at bedtime  calcitriol   Capsule 0.25 MICROGram(s) Oral daily  dextrose 5%. 1000 milliLiter(s) (50 mL/Hr) IV Continuous <Continuous>  dextrose 50% Injectable 12.5 Gram(s) IV Push once  dextrose 50% Injectable 25 Gram(s) IV Push once  dextrose 50% Injectable 25 Gram(s) IV Push once  heparin   Injectable 5000 Unit(s) SubCutaneous every 12 hours  hydrALAZINE 50 milliGRAM(s) Oral three times a day  insulin lispro (HumaLOG) corrective regimen sliding scale   SubCutaneous three times a day before meals  insulin lispro (HumaLOG) corrective regimen sliding scale   SubCutaneous at bedtime  iron sucrose Injectable 50 milliGRAM(s) IV Push <User Schedule>  isosorbide   mononitrate ER Tablet (IMDUR) 30 milliGRAM(s) Oral daily  Nephro-pito 1 Tablet(s) Oral daily  NIFEdipine XL 90 milliGRAM(s) Oral <User Schedule>  NIFEdipine XL 60 milliGRAM(s) Oral at bedtime  pantoprazole    Tablet 40 milliGRAM(s) Oral before breakfast  PPD  5 Tuberculin Unit(s) Injectable 5 Unit(s) IntraDermal once    MEDICATIONS  (PRN):  dextrose 40% Gel 15 Gram(s) Oral once PRN Blood Glucose LESS THAN 70 milliGRAM(s)/deciliter  glucagon  Injectable 1 milliGRAM(s) IntraMuscular once PRN Glucose LESS THAN 70 milligrams/deciliter    ALLERGIES:  Plavix (Hives)  Toprol-XL (Rash)    T(C): 37.1 (06-25-20 @ 10:12), Max: 37.1 (06-25-20 @ 10:12)  HR: 88 (06-25-20 @ 10:12) (86 - 98)  BP: 133/77 (06-25-20 @ 10:12) (133/77 - 161/61)  RR: 18 (06-25-20 @ 10:12) (18 - 18)  SpO2: 92% (06-25-20 @ 05:27) (92% - 96%)    06-24-20 @ 07:01  -  06-25-20 @ 07:00  --------------------------------------------------------  IN: 480 mL / OUT: 500 mL / NET: -20 mL        PHYSICAL EXAM:  Constitutional: Well-developed, well-nourished, in no apparent distress  Eyes: Sclerae anicteric, conjunctivae normal  ENMT: Mucus membranes moist, no oropharyngeal thrush noted  Neck: No thyroid nodules appreciated, no significant cervical or supraclavicular lymphadenopathy  Respiratory: Breathing nonlabored; clear to auscultation  Cardiovascular: Regular rate and rhythm  Gastrointestinal: Soft, nontender, nondistended, normoactive bowel sounds; no hepatosplenomegaly appreciated; no rebound tenderness or involuntary guarding  Rectal: Perianal exam within normal limits; normal sphincter tone; brown/dark stool on glove  Extremities: No clubbing, cyanosis or edema  Neurological: Alert and oriented to person, place and time; no asterixis  Skin: No jaundice  Musculoskeletal: No significant peripheral atrophy  Psychiatric: Affect and mood appropriate      LABS:             10.3   9.23  )-----------( 260      ( 06-25 @ 10:45 )             32.9                8.5    8.66  )-----------( 236      ( 06-24 @ 05:47 )             26.9                7.4    9.76  )-----------( 254      ( 06-23 @ 15:24 )             23.7                7.6    10.2  )-----------( 249      ( 06-23 @ 14:08 )             23.2         06-25    138  |  98  |  85.0<H>  ----------------------------<  223<H>  4.1   |  25.0  |  5.08<H>    Ca    9.2      25 Jun 2020 10:45    TPro  x   /  Alb  x   /  TBili  x   /  DBili  x   /  AST  x   /  ALT  65<H>  /  AlkPhos  x   06-24    LIVER FUNCTIONS - ( 24 Jun 2020 13:31 )  Alb: x     / Pro: x     / ALK PHOS: x     / ALT: 65 U/L / AST: x     / GGT: x                   IMAGING: I personally reviewed the XXXX, and I agree with the radiologist's interpretation as described below: HISTORY OF PRESENT ILLNESS: This is a 86y old man with a past medical history significant for CKD not on HD PTA as family was reluctant to proceed,  anemia of chronic disease, arrhythmia, HTN, CAD, DM, presented to ER for low Hemoglobin for transfusion and notes fatigue and restlessness for past few days.. Patient had recent EGD (11/19) with mild antral erosive gastritis and colonoscopy (11/19) with polypectomy of several small to moderate sized  tubular adenomas in the right colon where there was also a 2.5cm flat polyp in the mid ascending colon removed in two pieces and defect was clipped. Path showed tubular adenoma with high grade dysplasia. Also with 3cm ulcerated polyp at rectosigmoid junction which was oozing and was removed as well as a small rectal polyp that was removed and all were adenomas. There were AVMs in the cecum and transverse colon that were cauterized with APC and moderate sized hemorrhoids.  Patient denies nausea, vomiting,  abdominal pain, shortness of breath, chest pain.  Left forearm AV fistula was placed and HD initiated yesterday. There is no rectal bleeding or melena. He get CSF every two weeks and oral iron TID. The patient has been seen multiple time since that time for recurrant anemia and/or heme positive stool and it was suggested he undergo a capsule endoscopy. He is always heme positive    PAST MEDICAL/SURGICAL HISTORY:  Risk factors for obstructive sleep apnea  Anemia  RICHARDS (dyspnea on exertion)  VT (ventricular tachycardia)  HTN (hypertension)  CAD (coronary artery disease)  CKD (chronic kidney disease): stage IV  Arrhythmia  AV block, 1st degree  DM (diabetes mellitus)  H/O carotid endarterectomy: Right  A-V fistula: left arm 5/2017  H/O angioplasty: 2013,  no  intervention  H/O left knee surgery  H/O circumcision: at  age  65    SOCIAL HISTORY:  - TOBACCO: Denies  - ALCOHOL: Denies  - ILLICIT DRUG USE: Denies    FAMILY HISTORY:  No known history of gastrointestinal or liver disease;  FH: type 2 diabetes  Family history of premature CAD  Family history of lung cancer (Grandparent)  Family history of cancer (Grandparent)      HOME MEDICATIONS:  Aranesp 100 mcg/0.5 mL injectable solution: 0.5 milliliter(s) injectable every 2 weeks (08 Jun 2020 11:56)  aspirin 81 mg oral delayed release tablet: 1 tab(s) orally once a day (08 Jun 2020 11:56)  calcitriol 0.25 mcg oral capsule: 1 cap(s) orally once a day (08 Jun 2020 11:56)  cilostazol 100 mg oral tablet: 1 tab(s) orally 2 times a day (08 Jun 2020 11:56)  cloNIDine 0.1 mg oral tablet: 1 tab(s) orally once a day in the evening (08 Jun 2020 11:56)  ferrous sulfate 325 mg (65 mg elemental iron) oral delayed release tablet: 1 tab(s) orally 3 times a day (08 Jun 2020 11:56)  hydrALAZINE 50 mg oral tablet: 1 tab(s) orally 3 times a day (08 Jun 2020 11:56)  isosorbide mononitrate 30 mg oral tablet, extended release: 1 tab(s) orally once a day (at bedtime) 10PM (08 Jun 2020 11:56)  Nephro-Pito oral tablet: 1 tab(s) orally once a day (08 Jun 2020 11:56)  NIFEdipine 60 mg oral tablet, extended release: 1 tab(s) orally once a day (at bedtime) (08 Jun 2020 11:56)  NIFEdipine 90 mg oral tablet, extended release: 1 tab(s) orally once a day in the morning (08 Jun 2020 11:56)  pantoprazole 40 mg oral delayed release tablet: 1 tab(s) orally 3 times a day, As Needed (08 Jun 2020 11:56)  torsemide 20 mg oral tablet: 1 tab(s) orally 2 times a day (08 Jun 2020 11:56)  Vitamin C 250 mg oral tablet: 1 tab(s) orally once a day (08 Jun 2020 11:56)  Vitamin D3 50,000 intl units oral capsule: 1 cap(s) orally once a week (08 Jun 2020 11:56)    INPATIENT MEDICATIONS:  MEDICATIONS  (STANDING):  aspirin enteric coated 81 milliGRAM(s) Oral daily  atorvastatin 80 milliGRAM(s) Oral at bedtime  calcitriol   Capsule 0.25 MICROGram(s) Oral daily  dextrose 5%. 1000 milliLiter(s) (50 mL/Hr) IV Continuous <Continuous>  dextrose 50% Injectable 12.5 Gram(s) IV Push once  dextrose 50% Injectable 25 Gram(s) IV Push once  dextrose 50% Injectable 25 Gram(s) IV Push once  heparin   Injectable 5000 Unit(s) SubCutaneous every 12 hours  hydrALAZINE 50 milliGRAM(s) Oral three times a day  insulin lispro (HumaLOG) corrective regimen sliding scale   SubCutaneous three times a day before meals  insulin lispro (HumaLOG) corrective regimen sliding scale   SubCutaneous at bedtime  iron sucrose Injectable 50 milliGRAM(s) IV Push <User Schedule>  isosorbide   mononitrate ER Tablet (IMDUR) 30 milliGRAM(s) Oral daily  Nephro-pito 1 Tablet(s) Oral daily  NIFEdipine XL 90 milliGRAM(s) Oral <User Schedule>  NIFEdipine XL 60 milliGRAM(s) Oral at bedtime  pantoprazole    Tablet 40 milliGRAM(s) Oral before breakfast  PPD  5 Tuberculin Unit(s) Injectable 5 Unit(s) IntraDermal once    MEDICATIONS  (PRN):  dextrose 40% Gel 15 Gram(s) Oral once PRN Blood Glucose LESS THAN 70 milliGRAM(s)/deciliter  glucagon  Injectable 1 milliGRAM(s) IntraMuscular once PRN Glucose LESS THAN 70 milligrams/deciliter    ALLERGIES:  Plavix (Hives)  Toprol-XL (Rash)    T(C): 37.1 (06-25-20 @ 10:12), Max: 37.1 (06-25-20 @ 10:12)  HR: 88 (06-25-20 @ 10:12) (86 - 98)  BP: 133/77 (06-25-20 @ 10:12) (133/77 - 161/61)  RR: 18 (06-25-20 @ 10:12) (18 - 18)  SpO2: 92% (06-25-20 @ 05:27) (92% - 96%)    06-24-20 @ 07:01  -  06-25-20 @ 07:00  --------------------------------------------------------  IN: 480 mL / OUT: 500 mL / NET: -20 mL        PHYSICAL EXAM:  Constitutional: Well-developed, well-nourished, in no apparent distress  Eyes: Sclerae anicteric, conjunctivae normal  ENMT: Mucus membranes moist, no oropharyngeal thrush noted  Neck: No thyroid nodules appreciated, no significant cervical or supraclavicular lymphadenopathy  Respiratory: Breathing nonlabored; clear to auscultation  Cardiovascular: Regular rate and rhythm  Gastrointestinal: Soft, nontender, nondistended, normoactive bowel sounds; no hepatosplenomegaly appreciated; no rebound tenderness or involuntary guarding  Rectal: Perianal exam within normal limits; normal sphincter tone; brown/dark stool on glove  Extremities: No clubbing, cyanosis or edema  Neurological: Alert and oriented to person, place and time; no asterixis  Skin: No jaundice  Musculoskeletal: No significant peripheral atrophy  Psychiatric: Affect and mood appropriate      LABS:             10.3   9.23  )-----------( 260      ( 06-25 @ 10:45 )             32.9                8.5    8.66  )-----------( 236      ( 06-24 @ 05:47 )             26.9                7.4    9.76  )-----------( 254      ( 06-23 @ 15:24 )             23.7                7.6    10.2  )-----------( 249      ( 06-23 @ 14:08 )             23.2         06-25    138  |  98  |  85.0<H>  ----------------------------<  223<H>  4.1   |  25.0  |  5.08<H>    Ca    9.2      25 Jun 2020 10:45    TPro  x   /  Alb  x   /  TBili  x   /  DBili  x   /  AST  x   /  ALT  65<H>  /  AlkPhos  x   06-24    LIVER FUNCTIONS - ( 24 Jun 2020 13:31 )  Alb: x     / Pro: x     / ALK PHOS: x     / ALT: 65 U/L / AST: x     / GGT: x                   IMAGING: I personally reviewed the XXXX, and I agree with the radiologist's interpretation as described below: HISTORY OF PRESENT ILLNESS: This is a 86y old man with a past medical history significant for CKD on HD, anemia of chronic disease, arrhythmia, HTN, CAD, DM, presented to ER for low Hemoglobin for transfusion and notes fatigue and restlessness for past few days.. Patient had recent EGD (11/19) with mild antral erosive gastritis and colonoscopy (11/19) with polypectomy of several small to moderate sized  tubular adenomas in the right colon where there was also a 2.5cm flat polyp in the mid ascending colon removed in two pieces and defect was clipped. Path showed tubular adenoma with high grade dysplasia. Also with 3cm ulcerated polyp at rectosigmoid junction which was oozing and was removed as well as a small rectal polyp that was removed and all were adenomas. There were AVMs in the cecum and transverse colon that were cauterized with APC and moderate sized hemorrhoids.  Patient denies nausea, vomiting,  abdominal pain, shortness of breath, chest pain.  Left forearm AV fistula was placed and HD initiated yesterday. There is no rectal bleeding or melena. He get CSF every two weeks and oral iron TID. The patient has been seen multiple time since that time for recurrent anemia and/or heme positive stool and it was suggested he undergo a capsule endoscopy. He is always heme positive    PAST MEDICAL/SURGICAL HISTORY:  Risk factors for obstructive sleep apnea  Anemia  RICHARDS (dyspnea on exertion)  VT (ventricular tachycardia)  HTN (hypertension)  CAD (coronary artery disease)  CKD (chronic kidney disease): stage IV  Arrhythmia  AV block, 1st degree  DM (diabetes mellitus)  H/O carotid endarterectomy: Right  A-V fistula: left arm 5/2017  H/O angioplasty: 2013,  no  intervention  H/O left knee surgery  H/O circumcision: at  age  65    SOCIAL HISTORY:  - TOBACCO: Denies  - ALCOHOL: Denies  - ILLICIT DRUG USE: Denies    FAMILY HISTORY:  No known history of gastrointestinal or liver disease;  FH: type 2 diabetes  Family history of premature CAD  Family history of lung cancer (Grandparent)  Family history of cancer (Grandparent)      HOME MEDICATIONS:  Aranesp 100 mcg/0.5 mL injectable solution: 0.5 milliliter(s) injectable every 2 weeks (08 Jun 2020 11:56)  aspirin 81 mg oral delayed release tablet: 1 tab(s) orally once a day (08 Jun 2020 11:56)  calcitriol 0.25 mcg oral capsule: 1 cap(s) orally once a day (08 Jun 2020 11:56)  cilostazol 100 mg oral tablet: 1 tab(s) orally 2 times a day (08 Jun 2020 11:56)  cloNIDine 0.1 mg oral tablet: 1 tab(s) orally once a day in the evening (08 Jun 2020 11:56)  ferrous sulfate 325 mg (65 mg elemental iron) oral delayed release tablet: 1 tab(s) orally 3 times a day (08 Jun 2020 11:56)  hydrALAZINE 50 mg oral tablet: 1 tab(s) orally 3 times a day (08 Jun 2020 11:56)  isosorbide mononitrate 30 mg oral tablet, extended release: 1 tab(s) orally once a day (at bedtime) 10PM (08 Jun 2020 11:56)  Nephro-Pito oral tablet: 1 tab(s) orally once a day (08 Jun 2020 11:56)  NIFEdipine 60 mg oral tablet, extended release: 1 tab(s) orally once a day (at bedtime) (08 Jun 2020 11:56)  NIFEdipine 90 mg oral tablet, extended release: 1 tab(s) orally once a day in the morning (08 Jun 2020 11:56)  pantoprazole 40 mg oral delayed release tablet: 1 tab(s) orally 3 times a day, As Needed (08 Jun 2020 11:56)  torsemide 20 mg oral tablet: 1 tab(s) orally 2 times a day (08 Jun 2020 11:56)  Vitamin C 250 mg oral tablet: 1 tab(s) orally once a day (08 Jun 2020 11:56)  Vitamin D3 50,000 intl units oral capsule: 1 cap(s) orally once a week (08 Jun 2020 11:56)    INPATIENT MEDICATIONS:  MEDICATIONS  (STANDING):  aspirin enteric coated 81 milliGRAM(s) Oral daily  atorvastatin 80 milliGRAM(s) Oral at bedtime  calcitriol   Capsule 0.25 MICROGram(s) Oral daily  dextrose 5%. 1000 milliLiter(s) (50 mL/Hr) IV Continuous <Continuous>  dextrose 50% Injectable 12.5 Gram(s) IV Push once  dextrose 50% Injectable 25 Gram(s) IV Push once  dextrose 50% Injectable 25 Gram(s) IV Push once  heparin   Injectable 5000 Unit(s) SubCutaneous every 12 hours  hydrALAZINE 50 milliGRAM(s) Oral three times a day  insulin lispro (HumaLOG) corrective regimen sliding scale   SubCutaneous three times a day before meals  insulin lispro (HumaLOG) corrective regimen sliding scale   SubCutaneous at bedtime  iron sucrose Injectable 50 milliGRAM(s) IV Push <User Schedule>  isosorbide   mononitrate ER Tablet (IMDUR) 30 milliGRAM(s) Oral daily  Nephro-pito 1 Tablet(s) Oral daily  NIFEdipine XL 90 milliGRAM(s) Oral <User Schedule>  NIFEdipine XL 60 milliGRAM(s) Oral at bedtime  pantoprazole    Tablet 40 milliGRAM(s) Oral before breakfast  PPD  5 Tuberculin Unit(s) Injectable 5 Unit(s) IntraDermal once    MEDICATIONS  (PRN):  dextrose 40% Gel 15 Gram(s) Oral once PRN Blood Glucose LESS THAN 70 milliGRAM(s)/deciliter  glucagon  Injectable 1 milliGRAM(s) IntraMuscular once PRN Glucose LESS THAN 70 milligrams/deciliter    ALLERGIES:  Plavix (Hives)  Toprol-XL (Rash)    T(C): 37.1 (06-25-20 @ 10:12), Max: 37.1 (06-25-20 @ 10:12)  HR: 88 (06-25-20 @ 10:12) (86 - 98)  BP: 133/77 (06-25-20 @ 10:12) (133/77 - 161/61)  RR: 18 (06-25-20 @ 10:12) (18 - 18)  SpO2: 92% (06-25-20 @ 05:27) (92% - 96%)    06-24-20 @ 07:01  -  06-25-20 @ 07:00  --------------------------------------------------------  IN: 480 mL / OUT: 500 mL / NET: -20 mL        PHYSICAL EXAM:  Constitutional: Well-developed, well-nourished, in no apparent distress  Eyes: Sclerae anicteric, conjunctivae normal  ENMT: Mucus membranes moist, no oropharyngeal thrush noted  Neck: No thyroid nodules appreciated, no significant cervical or supraclavicular lymphadenopathy  Respiratory: Breathing nonlabored; clear to auscultation  Cardiovascular: Regular rate and rhythm  Gastrointestinal: Soft, nontender, nondistended, normoactive bowel sounds; no hepatosplenomegaly appreciated; no rebound tenderness or involuntary guarding  Rectal: Perianal exam within normal limits; normal sphincter tone; brown/dark stool on glove  Extremities: No clubbing, cyanosis or edema  Neurological: Alert and oriented to person, place and time; no asterixis  Skin: No jaundice  Musculoskeletal: No significant peripheral atrophy  Psychiatric: Affect and mood appropriate      LABS:             10.3   9.23  )-----------( 260      ( 06-25 @ 10:45 )             32.9                8.5    8.66  )-----------( 236      ( 06-24 @ 05:47 )             26.9                7.4    9.76  )-----------( 254      ( 06-23 @ 15:24 )             23.7                7.6    10.2  )-----------( 249      ( 06-23 @ 14:08 )             23.2         06-25    138  |  98  |  85.0<H>  ----------------------------<  223<H>  4.1   |  25.0  |  5.08<H>    Ca    9.2      25 Jun 2020 10:45    TPro  x   /  Alb  x   /  TBili  x   /  DBili  x   /  AST  x   /  ALT  65<H>  /  AlkPhos  x   06-24    LIVER FUNCTIONS - ( 24 Jun 2020 13:31 )  Alb: x     / Pro: x     / ALK PHOS: x     / ALT: 65 U/L / AST: x     / GGT: x                   IMAGING: I personally reviewed the XXXX, and I agree with the radiologist's interpretation as described below:

## 2020-06-26 ENCOUNTER — APPOINTMENT (OUTPATIENT)
Dept: NEPHROLOGY | Facility: CLINIC | Age: 85
End: 2020-06-26

## 2020-06-26 ENCOUNTER — TRANSCRIPTION ENCOUNTER (OUTPATIENT)
Age: 85
End: 2020-06-26

## 2020-06-26 ENCOUNTER — RESULT REVIEW (OUTPATIENT)
Age: 85
End: 2020-06-26

## 2020-06-26 LAB
ANION GAP SERPL CALC-SCNC: 17 MMOL/L — SIGNIFICANT CHANGE UP (ref 5–17)
BUN SERPL-MCNC: 42 MG/DL — HIGH (ref 8–20)
CALCIUM SERPL-MCNC: 8.8 MG/DL — SIGNIFICANT CHANGE UP (ref 8.6–10.2)
CHLORIDE SERPL-SCNC: 98 MMOL/L — SIGNIFICANT CHANGE UP (ref 98–107)
CO2 SERPL-SCNC: 25 MMOL/L — SIGNIFICANT CHANGE UP (ref 22–29)
CREAT SERPL-MCNC: 3.59 MG/DL — HIGH (ref 0.5–1.3)
GLUCOSE BLDC GLUCOMTR-MCNC: 128 MG/DL — HIGH (ref 70–99)
GLUCOSE BLDC GLUCOMTR-MCNC: 129 MG/DL — HIGH (ref 70–99)
GLUCOSE BLDC GLUCOMTR-MCNC: 204 MG/DL — HIGH (ref 70–99)
GLUCOSE BLDC GLUCOMTR-MCNC: 95 MG/DL — SIGNIFICANT CHANGE UP (ref 70–99)
GLUCOSE SERPL-MCNC: 88 MG/DL — SIGNIFICANT CHANGE UP (ref 70–99)
HCT VFR BLD CALC: 33.4 % — LOW (ref 39–50)
HGB BLD-MCNC: 10.4 G/DL — LOW (ref 13–17)
MCHC RBC-ENTMCNC: 28.7 PG — SIGNIFICANT CHANGE UP (ref 27–34)
MCHC RBC-ENTMCNC: 31.1 GM/DL — LOW (ref 32–36)
MCV RBC AUTO: 92 FL — SIGNIFICANT CHANGE UP (ref 80–100)
PLATELET # BLD AUTO: 270 K/UL — SIGNIFICANT CHANGE UP (ref 150–400)
POTASSIUM SERPL-MCNC: 3.4 MMOL/L — LOW (ref 3.5–5.3)
POTASSIUM SERPL-SCNC: 3.4 MMOL/L — LOW (ref 3.5–5.3)
RBC # BLD: 3.63 M/UL — LOW (ref 4.2–5.8)
RBC # FLD: 15.2 % — HIGH (ref 10.3–14.5)
SODIUM SERPL-SCNC: 140 MMOL/L — SIGNIFICANT CHANGE UP (ref 135–145)
WBC # BLD: 8.77 K/UL — SIGNIFICANT CHANGE UP (ref 3.8–10.5)
WBC # FLD AUTO: 8.77 K/UL — SIGNIFICANT CHANGE UP (ref 3.8–10.5)

## 2020-06-26 PROCEDURE — 99233 SBSQ HOSP IP/OBS HIGH 50: CPT

## 2020-06-26 PROCEDURE — 88305 TISSUE EXAM BY PATHOLOGIST: CPT | Mod: 26

## 2020-06-26 PROCEDURE — 45390 COLONOSCOPY W/RESECTION: CPT

## 2020-06-26 PROCEDURE — 45381 COLONOSCOPY SUBMUCOUS NJX: CPT | Mod: 59

## 2020-06-26 RX ORDER — NIFEDIPINE 30 MG
1 TABLET, EXTENDED RELEASE 24 HR ORAL
Qty: 0 | Refills: 0 | DISCHARGE

## 2020-06-26 RX ORDER — ISOSORBIDE MONONITRATE 60 MG/1
1 TABLET, EXTENDED RELEASE ORAL
Qty: 0 | Refills: 0 | DISCHARGE
Start: 2020-06-26

## 2020-06-26 RX ORDER — ISOSORBIDE MONONITRATE 60 MG/1
1 TABLET, EXTENDED RELEASE ORAL
Qty: 0 | Refills: 0 | DISCHARGE

## 2020-06-26 RX ORDER — NIFEDIPINE 30 MG
1 TABLET, EXTENDED RELEASE 24 HR ORAL
Qty: 0 | Refills: 0 | DISCHARGE
Start: 2020-06-26

## 2020-06-26 RX ORDER — LIDOCAINE AND PRILOCAINE CREAM 25; 25 MG/G; MG/G
1 CREAM TOPICAL DAILY
Refills: 0 | Status: DISCONTINUED | OUTPATIENT
Start: 2020-06-26 | End: 2020-06-27

## 2020-06-26 RX ORDER — LIDOCAINE AND PRILOCAINE CREAM 25; 25 MG/G; MG/G
1 CREAM TOPICAL
Qty: 30 | Refills: 0
Start: 2020-06-26 | End: 2020-07-25

## 2020-06-26 RX ORDER — POTASSIUM CHLORIDE 20 MEQ
40 PACKET (EA) ORAL ONCE
Refills: 0 | Status: COMPLETED | OUTPATIENT
Start: 2020-06-26 | End: 2020-06-26

## 2020-06-26 RX ADMIN — Medication 4: at 17:40

## 2020-06-26 RX ADMIN — Medication 90 MILLIGRAM(S): at 05:59

## 2020-06-26 RX ADMIN — Medication 50 MILLIGRAM(S): at 13:14

## 2020-06-26 RX ADMIN — CALCITRIOL 0.25 MICROGRAM(S): 0.5 CAPSULE ORAL at 13:14

## 2020-06-26 RX ADMIN — ISOSORBIDE MONONITRATE 30 MILLIGRAM(S): 60 TABLET, EXTENDED RELEASE ORAL at 13:14

## 2020-06-26 RX ADMIN — Medication 81 MILLIGRAM(S): at 13:14

## 2020-06-26 RX ADMIN — Medication 40 MILLIEQUIVALENT(S): at 13:14

## 2020-06-26 RX ADMIN — Medication 50 MILLIGRAM(S): at 05:59

## 2020-06-26 RX ADMIN — ATORVASTATIN CALCIUM 80 MILLIGRAM(S): 80 TABLET, FILM COATED ORAL at 21:49

## 2020-06-26 RX ADMIN — Medication 50 MILLIGRAM(S): at 21:49

## 2020-06-26 RX ADMIN — Medication 1 ENEMA: at 08:04

## 2020-06-26 RX ADMIN — Medication 60 MILLIGRAM(S): at 21:49

## 2020-06-26 RX ADMIN — Medication 1 TABLET(S): at 13:14

## 2020-06-26 RX ADMIN — PANTOPRAZOLE SODIUM 40 MILLIGRAM(S): 20 TABLET, DELAYED RELEASE ORAL at 05:59

## 2020-06-26 NOTE — PROGRESS NOTE ADULT - SUBJECTIVE AND OBJECTIVE BOX
Pt in colonscopy  Tele SR occ PVCs   HD initiated this admission  Cont antihypertensive therapy  Add low dose toprol 25 mg day

## 2020-06-26 NOTE — DISCHARGE NOTE PROVIDER - CARE PROVIDER_API CALL
Bay Avendano  INTERNAL MEDICINE  [address] [place] [id number]  Phone: [phone]  Fax: [phone]  Follow Up Time:     Siva Winston)  Surgery; Vascular Surgery  [address] [place] [address]  Phone: [phone]  Fax: [phone]  Follow Up Time:     Evangelist Giron  GASTROENTEROLOGY  [address] [place] [address]  Phone: [phone]  Fax: [phone]  Follow Up Time:     Jalyn Snyder  INTERNAL MEDICINE  [address] [place] [id number]  Phone: [phone]  Fax: [phone]  Follow Up Time: Bay Avendano  INTERNAL MEDICINE  [address] [place] [address]  Phone: [phone]  Fax: [phone]  Follow Up Time:     Siva Winston)  Surgery; Vascular Surgery  [address] [place] [address]  Phone: [phone]  Fax: [phone]  Follow Up Time:     Evangelist Giron  GASTROENTEROLOGY  [address] [place] [address]  Phone: [phone]  Fax: [phone]  Follow Up Time:     Jalny Snyder  INTERNAL MEDICINE  [address] [place] [id number]  Phone: [phone]  Fax: [phone]  Follow Up Time:     ION TURK  Internal Medicine  [address] [address]  Phone: [phone]  Fax: [phone]  Follow Up Time: 1 week

## 2020-06-26 NOTE — BRIEF OPERATIVE NOTE - NSICDXBRIEFPREOP_GEN_ALL_CORE_FT
PRE-OP DIAGNOSIS:  ESRD on dialysis 24-Jun-2020 15:54:47  Abi Perez
PRE-OP DIAGNOSIS:  Acute blood loss anemia 26-Jun-2020 10:45:51  Evangelist Giron

## 2020-06-26 NOTE — BRIEF OPERATIVE NOTE - NSICDXBRIEFPROCEDURE_GEN_ALL_CORE_FT
PROCEDURES:  Fistulogram, arteriovenous, with vein angioplasty 24-Jun-2020 15:54:36  Abi Perez
PROCEDURES:  Colonoscopy with use of endoclip 26-Jun-2020 10:45:15  Evangelist Giron  Colonoscopy with endoscopic mucosal resection 26-Jun-2020 10:44:15  Evangelist Giron

## 2020-06-26 NOTE — BRIEF OPERATIVE NOTE - OPERATION/FINDINGS
Stenosis of AVF, POBA with return of palpable thrill
Colonoscopy: Clip with residual polyp noted in ascending colon. EMR performed after injecting eleview. Endoclip placed at EMR site  Colonic diverticulosis  Internal hemorrhoids

## 2020-06-26 NOTE — PROGRESS NOTE ADULT - SUBJECTIVE AND OBJECTIVE BOX
[facility] DIVISION OF KIDNEY DISEASES AND HYPERTENSION -- HEMODIALYSIS NOTE  --------------------------------------------------------------------------------  Chief Complaint: ESRD/Ongoing hemodialysis requirement    24 hour events/subjective:  s/p HD yesterday; tolerated well      PAST HISTORY  --------------------------------------------------------------------------------  No significant changes to PMH, PSH, FHx, SHx, unless otherwise noted    ALLERGIES & MEDICATIONS  --------------------------------------------------------------------------------  Allergies    Plavix (Hives)  Toprol-XL (Rash)    Intolerances      Standing Inpatient Medications  aspirin enteric coated 81 milliGRAM(s) Oral daily  atorvastatin 80 milliGRAM(s) Oral at bedtime  calcitriol   Capsule 0.25 MICROGram(s) Oral daily  dextrose 5%. 1000 milliLiter(s) IV Continuous <Continuous>  dextrose 50% Injectable 12.5 Gram(s) IV Push once  dextrose 50% Injectable 25 Gram(s) IV Push once  dextrose 50% Injectable 25 Gram(s) IV Push once  hydrALAZINE 50 milliGRAM(s) Oral three times a day  insulin lispro (HumaLOG) corrective regimen sliding scale   SubCutaneous three times a day before meals  insulin lispro (HumaLOG) corrective regimen sliding scale   SubCutaneous at bedtime  iron sucrose Injectable 50 milliGRAM(s) IV Push <User Schedule>  isosorbide   mononitrate ER Tablet (IMDUR) 30 milliGRAM(s) Oral daily  Nephro-pito 1 Tablet(s) Oral daily  NIFEdipine XL 90 milliGRAM(s) Oral <User Schedule>  NIFEdipine XL 60 milliGRAM(s) Oral at bedtime  pantoprazole    Tablet 40 milliGRAM(s) Oral before breakfast    PRN Inpatient Medications  dextrose 40% Gel 15 Gram(s) Oral once PRN  glucagon  Injectable 1 milliGRAM(s) IntraMuscular once PRN      REVIEW OF SYSTEMS  --------------------------------------------------------------------------------  Gen: No weight changes, fatigue, fevers/chills, weakness  Skin: No rashes  Head/Eyes/Ears/Mouth: No headache  Respiratory: No dyspnea, cough,  CV: No chest pain, orthopnea  GI: No abdominal pain, diarrhea, constipation, nausea, vomiting,  MSK: No joint pain  Neuro: No dizziness/lightheadedness, weakness  Heme: No bleeding  Psych: No significant nervousness, anxiety, stress, depression    All other systems were reviewed and are negative, except as noted.    VITALS/PHYSICAL EXAM  --------------------------------------------------------------------------------  T(C): 36.7 (06-26-20 @ 05:57), Max: 36.8 (06-25-20 @ 15:05)  HR: 86 (06-26-20 @ 05:57) (82 - 96)  BP: 166/64 (06-26-20 @ 05:57) (139/68 - 169/63)  RR: 16 (06-26-20 @ 05:57) (16 - 18)  SpO2: 98% (06-26-20 @ 05:57) (98% - 99%)  Wt(kg): --        06-25-20 @ 07:01  -  06-26-20 @ 07:00  --------------------------------------------------------  IN: 100 mL / OUT: 600 mL / NET: -500 mL      Physical Exam:  	Gen: NAD  	HEENT: PERRL, supple neck,  	Pulm: CTA B/L  	CV: RRR, S1S2; no rub  	Abd: +BS, soft, nontender  	UE: Warm, intact strength; no asterixis  	LE: Warm, no edema  	Neuro: No focal deficits  	Psych: Normal affect and mood  	Skin: pale  	Vascular access: LUE AVF+    LABS/STUDIES  --------------------------------------------------------------------------------              10.4   8.77  >-----------<  270      [06-26-20 @ 07:02]              33.4     140  |  98  |  42.0  ----------------------------<  88      [06-26-20 @ 07:02]  3.4   |  25.0  |  3.59        Ca     8.8     [06-26-20 @ 07:02]      Phos  3.6     [06-25-20 @ 10:45]    TPro  x   /  Alb  3.7  /  TBili  x   /  DBili  x   /  AST  x   /  ALT  x   /  AlkPhos  x   [06-25-20 @ 10:45]    PT/INR: PT 11.7 , INR 1.03       [06-25-20 @ 14:19]      Iron 38, TIBC 289, %sat 13      [06-10-20 @ 06:29]  Ferritin 83      [06-10-20 @ 06:29]  HbA1c 4.9      [08-09-18 @ 05:54]    HBsAb <3.0      [06-25-20 @ 02:07]  HBsAg Nonreact      [06-25-20 @ 02:07]  HBcAb Nonreact      [06-25-20 @ 02:07]  HCV 0.11, Nonreact      [06-25-20 @ 02:07]

## 2020-06-26 NOTE — PROGRESS NOTE ADULT - SUBJECTIVE AND OBJECTIVE BOX
Patient is a 86y old  Male who presents with a chief complaint of low Hb (25 Jun 2020 13:46)      Patient seen and examined at bedside.     ALLERGIES:  Plavix (Hives)  Toprol-XL (Rash)    MEDICATIONS  (STANDING):  aspirin enteric coated 81 milliGRAM(s) Oral daily  atorvastatin 80 milliGRAM(s) Oral at bedtime  calcitriol   Capsule 0.25 MICROGram(s) Oral daily  dextrose 5%. 1000 milliLiter(s) (50 mL/Hr) IV Continuous <Continuous>  dextrose 50% Injectable 12.5 Gram(s) IV Push once  dextrose 50% Injectable 25 Gram(s) IV Push once  dextrose 50% Injectable 25 Gram(s) IV Push once  hydrALAZINE 50 milliGRAM(s) Oral three times a day  insulin lispro (HumaLOG) corrective regimen sliding scale   SubCutaneous three times a day before meals  insulin lispro (HumaLOG) corrective regimen sliding scale   SubCutaneous at bedtime  iron sucrose Injectable 50 milliGRAM(s) IV Push <User Schedule>  isosorbide   mononitrate ER Tablet (IMDUR) 30 milliGRAM(s) Oral daily  Nephro-pito 1 Tablet(s) Oral daily  NIFEdipine XL 90 milliGRAM(s) Oral <User Schedule>  NIFEdipine XL 60 milliGRAM(s) Oral at bedtime  pantoprazole    Tablet 40 milliGRAM(s) Oral before breakfast    MEDICATIONS  (PRN):  dextrose 40% Gel 15 Gram(s) Oral once PRN Blood Glucose LESS THAN 70 milliGRAM(s)/deciliter  glucagon  Injectable 1 milliGRAM(s) IntraMuscular once PRN Glucose LESS THAN 70 milligrams/deciliter    Vital Signs Last 24 Hrs  T(F): 98 (26 Jun 2020 05:57), Max: 98.8 (25 Jun 2020 10:12)  HR: 86 (26 Jun 2020 05:57) (82 - 96)  BP: 166/64 (26 Jun 2020 05:57) (133/77 - 169/63)  RR: 16 (26 Jun 2020 05:57) (16 - 18)  SpO2: 98% (26 Jun 2020 05:57) (98% - 99%)  I&O's Summary    25 Jun 2020 07:01  -  26 Jun 2020 07:00  --------------------------------------------------------  IN: 100 mL / OUT: 600 mL / NET: -500 mL    PHYSICAL EXAM:  General: NAD, alert  ENT: MMM, no thrush  Neck: Supple, No JVD  Lungs: clear to ascultation b/l; no wheezing   Cardio: +s1/s2 no edema b/l le   Abdomen: Soft, Nontender, Nondistended; Bowel sounds present  Extremities: No calf tenderness      LABS:                        10.4   8.77  )-----------( 270      ( 26 Jun 2020 07:02 )             33.4     06-26    140  |  98  |  42.0  ----------------------------<  88  3.4   |  25.0  |  3.59    Ca    8.8      26 Jun 2020 07:02  Phos  3.6     06-25    TPro  x   /  Alb  3.7  /  TBili  x   /  DBili  x   /  AST  x   /  ALT  x   /  AlkPhos  x   06-25    eGFR if Non African American: 14 mL/min/1.73M2 (06-26-20 @ 07:02)  eGFR if African American: 17 mL/min/1.73M2 (06-26-20 @ 07:02)    PT/INR - ( 25 Jun 2020 14:19 )   PT: 11.7 sec;   INR: 1.03 ratio      Glucose  POCT Blood Glucose.: 99 mg/dL (25 Jun 2020 21:58)  POCT Blood Glucose.: 90 mg/dL (25 Jun 2020 18:34)  POCT Blood Glucose.: 231 mg/dL (25 Jun 2020 12:05)  POCT Blood Glucose.: 144 mg/dL (25 Jun 2020 08:40)    RADIOLOGY & ADDITIONAL TESTS:  < from: Xray Chest 1 View-PORTABLE IMMEDIATE (06.23.20 @ 20:29) >  IMPRESSION:  Pulmonary edema and trace right pleural effusion suggesting congestive heart failure.  < end of copied text >    < from: Xray Chest 1 View AP/PA. (06.23.20 @ 16:13) >  IMPRESSION: Bilateral airspace opacities  < end of copied text >

## 2020-06-26 NOTE — PROGRESS NOTE ADULT - ASSESSMENT
86 year old male with a history of CKD stage 5, anemia on chronic disease and previous GI bleed status post colonoscopy with two ulcerated polyps s/p resections and colonic AVMs, EGD with gastritis, VPCs     #acute metabolic encephalopathy- improved  - multifactorial chf, esrd (uremia), anemia  - monitor mental status   - hd per nephro     #acute on chronic congestive heart failure- diastolic - improved  - s/p torsemide now on HD  - cardio consult appreciated  - lvef 65% 12/2019    #CAD/ PAD  - aspirin   - statin  - d/c pletal given chf     #ESRD on new HD start   - HD per nephro started 6/24  - vascular consult appreciated    - nephro consult appreciated  - monitor cr    - avoid nephrotoxic meds   - nutritionist consult    #generalized weakness  - PT consult pending     #acute on Chronic diastolic CHF  - torsemide  - LVEF 65% 12/2019  - cardio consult appreciated     #Acute on chronic anemia   - likely secondary to underlying CKD  - s/p prbc  - Monitor Hb/hct  - on aranesp qw outpatient     #large sessile polyp hx on previous colonoscopy in ascending colon w/ high grade dysplasia  - repeat colonoscopy this am   - in patient with fobt positive- per gi unknown clinical significance given esrd and chronic illness  - gi consult appreciated  - monitor h and h     #Hypertension  - monitor blood pressure   - imdur, hydralyzine, nifedipine, and clonidine      #moderate protein calorie malnutrition  - nutrition consult appreciated    #DVT prophylaxis  - lovenox SC 86 year old male with a history of CKD stage 5, anemia on chronic disease and previous GI bleed status post colonoscopy with two ulcerated polyps s/p resections and colonic AVMs, EGD with gastritis, VPCs     #acute metabolic encephalopathy- improved  - multifactorial chf, esrd (uremia), anemia  - monitor mental status   - hd per nephro     #acute on chronic congestive heart failure- diastolic - improved  - s/p torsemide now on HD  - cardio consult appreciated  - lvef 65% 12/2019    #CAD/ PAD  - aspirin   - statin  - d/c pletal given chf     #ESRD on new HD start   - HD per nephro started 6/24  - vascular consult appreciated    - nephro consult appreciated  - monitor cr    - avoid nephrotoxic meds   - nutritionist consult    #generalized weakness  - PT consult pending     #acute on Chronic diastolic CHF  - torsemide  - LVEF 65% 12/2019  - cardio consult appreciated     #Acute on chronic anemia   - likely secondary to underlying CKD  - s/p prbc  - Monitor Hb/hct  - on aranesp qw outpatient     #large sessile polyp hx on previous colonoscopy in ascending colon w/ high grade dysplasia  - repeat colonoscopy this am   - in patient with fobt positive- per gi unknown clinical significance given esrd and chronic illness  - gi consult appreciated  - monitor h and h     #Hypertension  - monitor blood pressure   - imdur, hydralyzine, nifedipine, and clonidine      #moderate protein calorie malnutrition  - nutrition consult appreciated    #hypokalemia  - repleted   - monitor    #DVT prophylaxis  - lovenox SC

## 2020-06-26 NOTE — DISCHARGE NOTE NURSING/CASE MANAGEMENT/SOCIAL WORK - NSDCFUADDAPPT_GEN_ALL_CORE_FT
Hemodialysis [facility] [address] [place] [address] [facility] [phone]. Start at 2:00pm for first session on June 30, usual time will be 3:00 going forward.

## 2020-06-26 NOTE — DISCHARGE NOTE PROVIDER - PROVIDER TOKENS
PROVIDER:[TOKEN:[[id number]]],PROVIDER:[TOKEN:[[id number]]],PROVIDER:[TOKEN:[[id number]]],PROVIDER:[TOKEN:[[id number]]] PROVIDER:[TOKEN:[[id number]]],PROVIDER:[TOKEN:[[id number]]],PROVIDER:[TOKEN:[[id number]]],PROVIDER:[TOKEN:[[id number]]],PROVIDER:[TOKEN:[[id number]],FOLLOWUP:[1 week]]

## 2020-06-26 NOTE — DISCHARGE NOTE PROVIDER - HOSPITAL COURSE
86 year old male with a history of CKD stage 5, anemia on chronic disease and previous GI bleed status post colonoscopy with two ulcerated polyps s/p resections and colonic AVMs, EGD with gastritis, VPCs. Patient coming to hospital due to chf and found to have acute metabolic encephalopathy due to renal failure. patient a new hemodilaysis start and previous to hemodialysis was seen by vascular surgery and had av fistulogram to use for hemodialysis. patient also fobt positive and had colonoscopy via gi as previously had large polyp w/ dysplasia removed so needed follow up. patient now being discharged in stable condition will need out patient follow up for continued dialysis. dialysis seat set up in community- 1st dialysis on tuesday.        Time spent on patients discharge 32 minutes 86 year old male with a history of CKD stage 5, anemia on chronic disease and previous GI bleed status post colonoscopy with two ulcerated polyps s/p resections and colonic AVMs, EGD with gastritis, VPCs. Patient coming to hospital due to chf and found to have acute metabolic encephalopathy due to renal failure. patient a new hemodilaysis start and previous to hemodialysis was seen by vascular surgery and had av fistulogram to use for hemodialysis. patient also fobt positive and had colonoscopy via gi as previously had large polyp w/ dysplasia removed so needed follow up. patient now being discharged in stable condition will need out patient follow up for continued dialysis. dialysis seat set up in community- 1st dialysis on tuesday.        Time spent on patients discharge 32 minutes                 Vital Signs Last 24 Hrs    T(C): 36.8 (27 Jun 2020 07:19), Max: 36.8 (26 Jun 2020 15:22)    T(F): 98.2 (27 Jun 2020 07:19), Max: 98.3 (26 Jun 2020 15:22)    HR: 81 (27 Jun 2020 07:19) (81 - 93)    BP: 147/59 (27 Jun 2020 07:19) (134/69 - 166/65)    BP(mean): --    RR: 18 (27 Jun 2020 07:19) (16 - 18)    SpO2: 98% (27 Jun 2020 07:19) (96% - 100%)            CONSTITUTIONAL: Well appearing, well nourished, awake, alert and in no apparent distress    CARDIAC: Normal rate, regular rhythm.  Heart sounds S1, S2.  No murmurs, rubs or gallops     RESPIRATORY: Breath sounds clear and equal bilaterally. No wheezes, rhales or rhonchi    GASTROENTEROLOGY: Soft nt nd bs + normoactive     EXTREMITIES: No edema, cyanosis or deformity     NEUROLOGICAL: Alert and oriented, no focal deficits, no motor or sensory deficits.    SKIN: No rash, skin turgor wnl  age relates changes

## 2020-06-26 NOTE — DISCHARGE NOTE PROVIDER - CARE PROVIDERS DIRECT ADDRESSES
,DirectAddress_Unknown,[email or web address],DirectAddress_Unknown ,DirectAddress_Unknown,[email or web address],DirectAddress_Unknown,DirectAddress_Unknown

## 2020-06-26 NOTE — BRIEF OPERATIVE NOTE - NSICDXBRIEFPOSTOP_GEN_ALL_CORE_FT
POST-OP DIAGNOSIS:  ESRD on dialysis 24-Jun-2020 15:54:56  Abi Perez
POST-OP DIAGNOSIS:  Internal hemorrhoids 26-Jun-2020 10:47:00  Evangelist Giron  Diverticulosis of colon 26-Jun-2020 10:46:52  Evangelist Giron  Colon polyp 26-Jun-2020 10:46:33  Evangelist Giron

## 2020-06-26 NOTE — DISCHARGE NOTE PROVIDER - NSDCCPCAREPLAN_GEN_ALL_CORE_FT
PRINCIPAL DISCHARGE DIAGNOSIS  Diagnosis: Acute renal insufficiency  Assessment and Plan of Treatment: - take medications as rx   - follow up with nephrology, primary medical doctor, cardiology, vascular surgery and gastroetnerology  - dialysis per nephrology PRINCIPAL DISCHARGE DIAGNOSIS  Diagnosis: ESRD on dialysis  Assessment and Plan of Treatment: Continue with dialysis as scheduled tuesday/thursday/ saturday as scheduled and recommended by the nephrologist. Please follow up with the nephrologist with scheduled appointment.      SECONDARY DISCHARGE DIAGNOSES  Diagnosis: Heart failure, diastolic, chronic  Assessment and Plan of Treatment: Continue with off loading fluid with hemo dialysis. Continue with home medications.    Diagnosis: Colon polyp  Assessment and Plan of Treatment: Follow up with your gastroenterologist for further management and care.    Diagnosis: CAD (coronary artery disease)  Assessment and Plan of Treatment: Continue with home medication and follow up with scheduled caridology appointment.    Diagnosis: Anemia of chronic disease  Assessment and Plan of Treatment: Continue with monitoring your hemoglobin level. Please follow up with the nephrologist because you may need aranesp shots based on your hemoglobin.    Diagnosis: Conjunctivitis  Assessment and Plan of Treatment: Use anti allergy medication like zyrtex for a few days and artificial tears. Follow up with the opthamologist.

## 2020-06-26 NOTE — DISCHARGE NOTE PROVIDER - NSDCMRMEDTOKEN_GEN_ALL_CORE_FT
Aranesp 100 mcg/0.5 mL injectable solution: 0.5 milliliter(s) injectable every 2 weeks  aspirin 81 mg oral delayed release tablet: 1 tab(s) orally once a day  atorvastatin 80 mg oral tablet: 1 tab(s) orally once a day (at bedtime)  calcitriol 0.25 mcg oral capsule: 1 cap(s) orally once a day  ferrous sulfate 325 mg (65 mg elemental iron) oral delayed release tablet: 1 tab(s) orally 3 times a day  hydrALAZINE 50 mg oral tablet: 1 tab(s) orally 3 times a day  isosorbide mononitrate 30 mg oral tablet, extended release: 1 tab(s) orally once a day  Nephro-Thomas oral tablet: 1 tab(s) orally once a day  NIFEdipine 60 mg oral tablet, extended release: 1 tab(s) orally once a day (at bedtime)  NIFEdipine 90 mg oral tablet, extended release: 1 tab(s) orally   pantoprazole 40 mg oral delayed release tablet: 1 tab(s) orally 3 times a day, As Needed  Vitamin C 250 mg oral tablet: 1 tab(s) orally once a day  Vitamin D3 50,000 intl units oral capsule: 1 cap(s) orally once a week Aranesp 100 mcg/0.5 mL injectable solution: 0.5 milliliter(s) injectable every 2 weeks    as recommended by nephrologist   aspirin 81 mg oral delayed release tablet: 1 tab(s) orally once a day  atorvastatin 80 mg oral tablet: 1 tab(s) orally once a day (at bedtime)  calcitriol 0.25 mcg oral capsule: 1 cap(s) orally once a day  ferrous sulfate 325 mg (65 mg elemental iron) oral delayed release tablet: 1 tab(s) orally 3 times a day  hydrALAZINE 50 mg oral tablet: 1 tab(s) orally 3 times a day  isosorbide mononitrate 30 mg oral tablet, extended release: 1 tab(s) orally once a day  lidocaine-prilocaine 2.5%-2.5% topical cream: 1 application topically once a day  Nephro-Thomas oral tablet: 1 tab(s) orally once a day  NIFEdipine 60 mg oral tablet, extended release: 1 tab(s) orally once a day (at bedtime)  NIFEdipine 90 mg oral tablet, extended release: 1 tab(s) orally   pantoprazole 40 mg oral delayed release tablet: 1 tab(s) orally 3 times a day, As Needed  Vitamin C 250 mg oral tablet: 1 tab(s) orally once a day  Vitamin D3 50,000 intl units oral capsule: 1 cap(s) orally once a week

## 2020-06-26 NOTE — PROGRESS NOTE ADULT - ASSESSMENT
1) ESKD on HD  S/P LAV fistulogram / angioplasty of cephalic vein    Started on HD 06/24; s/p 2 sessions  plan for HD tomorrow    2) anemia of CKD with superimposed GIB  plan for colonoscopy today  AMY with HD; transfuse prn    3) Hypertension  On imdur, hydralazine, nifedipine, and clonidine   UF with HD as tolerated    4) CKD- MBD  Monitor Ca++ and phos  Continue calcitriol

## 2020-06-26 NOTE — DISCHARGE NOTE PROVIDER - NSDCFUADDAPPT_GEN_ALL_CORE_FT
Hemodialysis [facility] [address] [place] [address] [facility] [phone]. Start at 2:00pm for first session on June 30, usual time will be 3:00 going forward. Hemodialysis [facility] [address] [place] [address] [facility] [phone]. Start at 2:00pm for first session on June 30, usual time will be 3:00 going forward. Please follow up with your primary care physician in 3-5 days.

## 2020-06-26 NOTE — DISCHARGE NOTE NURSING/CASE MANAGEMENT/SOCIAL WORK - PATIENT PORTAL LINK FT
You can access the FollowMyHealth Patient Portal offered by [facility] by registering at the following website: [email or web address]. By joining [employer]’s FollowMyHealth portal, you will also be able to view your health information using other applications (apps) compatible with our system.

## 2020-06-26 NOTE — DISCHARGE NOTE PROVIDER - NSDCFUSCHEDAPPT_GEN_ALL_CORE_FT
PROETTA, CHRISTIANO ; 06/30/2020 ; [facility] [address]  PROETTA, CHRISTIANO ; 07/06/2020 ; [facility]  PROETTA, CHRISTIANO ; 07/06/2020 ; [facility]  PROETTA, CHRISTIANO ; 07/13/2020 ; [facility] [address]  PROETTA, CHRISTIANO ; 07/27/2020 ; [facility]  PROETTA, CHRISTIANO ; 08/04/2020 ; [facility] V[facility] [address]  PROCRYSTAL, CHRISTIANO ; 08/10/2020 ; [facility]

## 2020-06-27 VITALS
SYSTOLIC BLOOD PRESSURE: 138 MMHG | TEMPERATURE: 98 F | RESPIRATION RATE: 17 BRPM | DIASTOLIC BLOOD PRESSURE: 64 MMHG | HEART RATE: 78 BPM | OXYGEN SATURATION: 99 %

## 2020-06-27 LAB
ANION GAP SERPL CALC-SCNC: 17 MMOL/L — SIGNIFICANT CHANGE UP (ref 5–17)
BUN SERPL-MCNC: 49 MG/DL — HIGH (ref 8–20)
CALCIUM SERPL-MCNC: 8.8 MG/DL — SIGNIFICANT CHANGE UP (ref 8.6–10.2)
CHLORIDE SERPL-SCNC: 101 MMOL/L — SIGNIFICANT CHANGE UP (ref 98–107)
CO2 SERPL-SCNC: 23 MMOL/L — SIGNIFICANT CHANGE UP (ref 22–29)
CREAT SERPL-MCNC: 4.45 MG/DL — HIGH (ref 0.5–1.3)
GLUCOSE BLDC GLUCOMTR-MCNC: 112 MG/DL — HIGH (ref 70–99)
GLUCOSE BLDC GLUCOMTR-MCNC: 176 MG/DL — HIGH (ref 70–99)
GLUCOSE SERPL-MCNC: 84 MG/DL — SIGNIFICANT CHANGE UP (ref 70–99)
HCT VFR BLD CALC: 30 % — LOW (ref 39–50)
HGB BLD-MCNC: 9.3 G/DL — LOW (ref 13–17)
MCHC RBC-ENTMCNC: 29.2 PG — SIGNIFICANT CHANGE UP (ref 27–34)
MCHC RBC-ENTMCNC: 31 GM/DL — LOW (ref 32–36)
MCV RBC AUTO: 94 FL — SIGNIFICANT CHANGE UP (ref 80–100)
PLATELET # BLD AUTO: 261 K/UL — SIGNIFICANT CHANGE UP (ref 150–400)
POTASSIUM SERPL-MCNC: 4.2 MMOL/L — SIGNIFICANT CHANGE UP (ref 3.5–5.3)
POTASSIUM SERPL-SCNC: 4.2 MMOL/L — SIGNIFICANT CHANGE UP (ref 3.5–5.3)
RBC # BLD: 3.19 M/UL — LOW (ref 4.2–5.8)
RBC # FLD: 15.7 % — HIGH (ref 10.3–14.5)
SODIUM SERPL-SCNC: 141 MMOL/L — SIGNIFICANT CHANGE UP (ref 135–145)
WBC # BLD: 9.58 K/UL — SIGNIFICANT CHANGE UP (ref 3.8–10.5)
WBC # FLD AUTO: 9.58 K/UL — SIGNIFICANT CHANGE UP (ref 3.8–10.5)

## 2020-06-27 PROCEDURE — C1889: CPT

## 2020-06-27 PROCEDURE — 99232 SBSQ HOSP IP/OBS MODERATE 35: CPT

## 2020-06-27 PROCEDURE — 82962 GLUCOSE BLOOD TEST: CPT

## 2020-06-27 PROCEDURE — 85610 PROTHROMBIN TIME: CPT

## 2020-06-27 PROCEDURE — 76000 FLUOROSCOPY <1 HR PHYS/QHP: CPT

## 2020-06-27 PROCEDURE — P9016: CPT

## 2020-06-27 PROCEDURE — 80048 BASIC METABOLIC PNL TOTAL CA: CPT

## 2020-06-27 PROCEDURE — 86901 BLOOD TYPING SEROLOGIC RH(D): CPT

## 2020-06-27 PROCEDURE — 86923 COMPATIBILITY TEST ELECTRIC: CPT

## 2020-06-27 PROCEDURE — 36430 TRANSFUSION BLD/BLD COMPNT: CPT

## 2020-06-27 PROCEDURE — 86900 BLOOD TYPING SEROLOGIC ABO: CPT

## 2020-06-27 PROCEDURE — 82272 OCCULT BLD FECES 1-3 TESTS: CPT

## 2020-06-27 PROCEDURE — 87635 SARS-COV-2 COVID-19 AMP PRB: CPT

## 2020-06-27 PROCEDURE — 80053 COMPREHEN METABOLIC PANEL: CPT

## 2020-06-27 PROCEDURE — C1725: CPT

## 2020-06-27 PROCEDURE — 97163 PT EVAL HIGH COMPLEX 45 MIN: CPT

## 2020-06-27 PROCEDURE — 86850 RBC ANTIBODY SCREEN: CPT

## 2020-06-27 PROCEDURE — 99261: CPT

## 2020-06-27 PROCEDURE — 87340 HEPATITIS B SURFACE AG IA: CPT

## 2020-06-27 PROCEDURE — 76080 X-RAY EXAM OF FISTULA: CPT

## 2020-06-27 PROCEDURE — 99285 EMERGENCY DEPT VISIT HI MDM: CPT | Mod: 25

## 2020-06-27 PROCEDURE — 84460 ALANINE AMINO (ALT) (SGPT): CPT

## 2020-06-27 PROCEDURE — 88305 TISSUE EXAM BY PATHOLOGIST: CPT

## 2020-06-27 PROCEDURE — 86704 HEP B CORE ANTIBODY TOTAL: CPT

## 2020-06-27 PROCEDURE — C1769: CPT

## 2020-06-27 PROCEDURE — 90937 HEMODIALYSIS REPEATED EVAL: CPT

## 2020-06-27 PROCEDURE — 99239 HOSP IP/OBS DSCHRG MGMT >30: CPT

## 2020-06-27 PROCEDURE — 96374 THER/PROPH/DIAG INJ IV PUSH: CPT

## 2020-06-27 PROCEDURE — C1894: CPT

## 2020-06-27 PROCEDURE — 86705 HEP B CORE ANTIBODY IGM: CPT

## 2020-06-27 PROCEDURE — 80069 RENAL FUNCTION PANEL: CPT

## 2020-06-27 PROCEDURE — 86803 HEPATITIS C AB TEST: CPT

## 2020-06-27 PROCEDURE — 86706 HEP B SURFACE ANTIBODY: CPT

## 2020-06-27 PROCEDURE — 85027 COMPLETE CBC AUTOMATED: CPT

## 2020-06-27 PROCEDURE — 36415 COLL VENOUS BLD VENIPUNCTURE: CPT

## 2020-06-27 PROCEDURE — 93005 ELECTROCARDIOGRAM TRACING: CPT

## 2020-06-27 PROCEDURE — 71045 X-RAY EXAM CHEST 1 VIEW: CPT

## 2020-06-27 PROCEDURE — 86709 HEPATITIS A IGM ANTIBODY: CPT

## 2020-06-27 RX ORDER — ERYTHROPOIETIN 10000 [IU]/ML
10000 INJECTION, SOLUTION INTRAVENOUS; SUBCUTANEOUS ONCE
Refills: 0 | Status: DISCONTINUED | OUTPATIENT
Start: 2020-06-27 | End: 2020-06-27

## 2020-06-27 RX ADMIN — Medication 90 MILLIGRAM(S): at 05:13

## 2020-06-27 RX ADMIN — Medication 81 MILLIGRAM(S): at 12:33

## 2020-06-27 RX ADMIN — CALCITRIOL 0.25 MICROGRAM(S): 0.5 CAPSULE ORAL at 12:33

## 2020-06-27 RX ADMIN — Medication 50 MILLIGRAM(S): at 05:13

## 2020-06-27 RX ADMIN — ISOSORBIDE MONONITRATE 30 MILLIGRAM(S): 60 TABLET, EXTENDED RELEASE ORAL at 12:33

## 2020-06-27 RX ADMIN — Medication 1 TABLET(S): at 12:32

## 2020-06-27 RX ADMIN — LIDOCAINE AND PRILOCAINE CREAM 1 APPLICATION(S): 25; 25 CREAM TOPICAL at 06:23

## 2020-06-27 RX ADMIN — Medication 2: at 12:33

## 2020-06-27 RX ADMIN — PANTOPRAZOLE SODIUM 40 MILLIGRAM(S): 20 TABLET, DELAYED RELEASE ORAL at 05:13

## 2020-06-27 NOTE — PROGRESS NOTE ADULT - SUBJECTIVE AND OBJECTIVE BOX
Patient was seen and evaluated on dialysis.   Patient is tolerating the procedure well.   T(C): 36.8 (06-27-20 @ 07:19), Max: 36.8 (06-26-20 @ 15:22)  HR: 82 (06-27-20 @ 10:30) (81 - 93)  BP: 142/56 (06-27-20 @ 10:30) (134/69 - 166/65)  Continue dialysis:   Dialyzer: Revalcear 300     QB:   350     QD: 500ml.,  Goal UF __ over __ Hours Patient was seen and evaluated on dialysis.   Patient is tolerating the procedure well.   T(C): 36.8 (06-27-20 @ 07:19), Max: 36.8 (06-26-20 @ 15:22)  HR: 82 (06-27-20 @ 10:30) (81 - 93)  BP: 142/56 (06-27-20 @ 10:30) (134/69 - 166/65)  Continue dialysis:   Dialyzer: Revalcear 300     QB:   350     QD: 500ml.,  Goal UF 0.5 kg over 3 Hours

## 2020-06-27 NOTE — PROGRESS NOTE ADULT - SUBJECTIVE AND OBJECTIVE BOX
[facility] DIVISION OF KIDNEY DISEASES AND HYPERTENSION -- HEMODIALYSIS NOTE  --------------------------------------------------------------------------------  Chief Complaint: ESRD/Ongoing hemodialysis requirement    24 hour events/subjective:        PAST HISTORY  --------------------------------------------------------------------------------  No significant changes to PMH, PSH, FHx, SHx, unless otherwise noted    ALLERGIES & MEDICATIONS  --------------------------------------------------------------------------------  Allergies    Plavix (Hives)  Toprol-XL (Rash)    Intolerances      Standing Inpatient Medications  aspirin enteric coated 81 milliGRAM(s) Oral daily  atorvastatin 80 milliGRAM(s) Oral at bedtime  calcitriol   Capsule 0.25 MICROGram(s) Oral daily  dextrose 5%. 1000 milliLiter(s) IV Continuous <Continuous>  dextrose 50% Injectable 12.5 Gram(s) IV Push once  dextrose 50% Injectable 25 Gram(s) IV Push once  dextrose 50% Injectable 25 Gram(s) IV Push once  hydrALAZINE 50 milliGRAM(s) Oral three times a day  insulin lispro (HumaLOG) corrective regimen sliding scale   SubCutaneous three times a day before meals  insulin lispro (HumaLOG) corrective regimen sliding scale   SubCutaneous at bedtime  iron sucrose Injectable 50 milliGRAM(s) IV Push <User Schedule>  isosorbide   mononitrate ER Tablet (IMDUR) 30 milliGRAM(s) Oral daily  lidocaine/prilocaine Cream 1 Application(s) Topical daily  Nephro-pito 1 Tablet(s) Oral daily  NIFEdipine XL 90 milliGRAM(s) Oral <User Schedule>  NIFEdipine XL 60 milliGRAM(s) Oral at bedtime  pantoprazole    Tablet 40 milliGRAM(s) Oral before breakfast    PRN Inpatient Medications  dextrose 40% Gel 15 Gram(s) Oral once PRN  glucagon  Injectable 1 milliGRAM(s) IntraMuscular once PRN      REVIEW OF SYSTEMS  --------------------------------------------------------------------------------  Gen: No weight changes, fatigue, fevers/chills, weakness  Skin: No rashes  Head/Eyes/Ears/Mouth: No headache  Respiratory: No dyspnea, cough,  CV: No chest pain, orthopnea  GI: No abdominal pain, diarrhea, constipation, nausea, vomiting,  MSK: No joint pain  Neuro: No dizziness/lightheadedness, weakness  Heme: No bleeding  Psych: No significant nervousness, anxiety, stress, depression    All other systems were reviewed and are negative, except as noted.    VITALS/PHYSICAL EXAM  --------------------------------------------------------------------------------  T(C): 36.8 (06-27-20 @ 07:19), Max: 36.8 (06-26-20 @ 15:22)  HR: 81 (06-27-20 @ 07:19) (81 - 93)  BP: 147/59 (06-27-20 @ 07:19) (134/69 - 166/65)  RR: 18 (06-27-20 @ 07:19) (16 - 18)  SpO2: 98% (06-27-20 @ 07:19) (96% - 100%)  Wt(kg): --        06-26-20 @ 07:01  -  06-27-20 @ 07:00  --------------------------------------------------------  IN: 720 mL / OUT: 0 mL / NET: 720 mL      Physical Exam:  	Gen: NAD, well-appearing  	HEENT: PERRL, supple neck,  	Pulm: CTA B/L  	CV: RRR, S1S2; no rub  	Abd: +BS, soft, nontender  	UE: Warm, intact strength; no asterixis  	LE: Warm, + edema  	Neuro: No focal deficits  	Psych: Normal affect and mood  	Skin: Warm, without rashes  	Vascular access:    LABS/STUDIES  --------------------------------------------------------------------------------              9.3    9.58  >-----------<  261      [06-27-20 @ 06:19]              30.0     141  |  101  |  49.0  ----------------------------<  84      [06-27-20 @ 06:10]  4.2   |  23.0  |  4.45        Ca     8.8     [06-27-20 @ 06:10]      PT/INR: PT 11.7 , INR 1.03       [06-25-20 @ 14:19]      Iron 38, TIBC 289, %sat 13      [06-10-20 @ 06:29]  Ferritin 83      [06-10-20 @ 06:29]  HbA1c 4.9      [08-09-18 @ 05:54]    HBsAb <3.0      [06-25-20 @ 02:07]  HBsAg Nonreact      [06-25-20 @ 02:07]  HBcAb Nonreact      [06-25-20 @ 02:07]  HCV 0.11, Nonreact      [06-25-20 @ 02:07]

## 2020-06-27 NOTE — PROGRESS NOTE ADULT - SUBJECTIVE AND OBJECTIVE BOX
INTERVAL HPI/OVERNIGHT EVENTS:FU for anemia. S/p Colonoscopy with EMR. No complaints. Seen in HD.     MEDICATIONS  (STANDING):  aspirin enteric coated 81 milliGRAM(s) Oral daily  atorvastatin 80 milliGRAM(s) Oral at bedtime  calcitriol   Capsule 0.25 MICROGram(s) Oral daily  dextrose 5%. 1000 milliLiter(s) (50 mL/Hr) IV Continuous <Continuous>  dextrose 50% Injectable 12.5 Gram(s) IV Push once  dextrose 50% Injectable 25 Gram(s) IV Push once  dextrose 50% Injectable 25 Gram(s) IV Push once  epoetin juan-epbx (RETACRIT) Injectable 26355 Unit(s) SubCutaneous once  hydrALAZINE 50 milliGRAM(s) Oral three times a day  insulin lispro (HumaLOG) corrective regimen sliding scale   SubCutaneous three times a day before meals  insulin lispro (HumaLOG) corrective regimen sliding scale   SubCutaneous at bedtime  iron sucrose Injectable 50 milliGRAM(s) IV Push <User Schedule>  isosorbide   mononitrate ER Tablet (IMDUR) 30 milliGRAM(s) Oral daily  lidocaine/prilocaine Cream 1 Application(s) Topical daily  Nephro-pito 1 Tablet(s) Oral daily  NIFEdipine XL 90 milliGRAM(s) Oral <User Schedule>  NIFEdipine XL 60 milliGRAM(s) Oral at bedtime  pantoprazole    Tablet 40 milliGRAM(s) Oral before breakfast    MEDICATIONS  (PRN):  dextrose 40% Gel 15 Gram(s) Oral once PRN Blood Glucose LESS THAN 70 milliGRAM(s)/deciliter  glucagon  Injectable 1 milliGRAM(s) IntraMuscular once PRN Glucose LESS THAN 70 milligrams/deciliter      Allergies    Plavix (Hives)  Toprol-XL (Rash)    Intolerances        Vital Signs Last 24 Hrs  T(C): 36.8 (27 Jun 2020 07:19), Max: 36.8 (26 Jun 2020 15:22)  T(F): 98.2 (27 Jun 2020 07:19), Max: 98.3 (26 Jun 2020 15:22)  HR: 82 (27 Jun 2020 10:30) (81 - 93)  BP: 142/56 (27 Jun 2020 10:30) (134/69 - 166/65)  BP(mean): --  RR: 18 (27 Jun 2020 10:30) (16 - 18)  SpO2: 99% (27 Jun 2020 10:30) (96% - 100%)    LABS:                        9.3    9.58  )-----------( 261      ( 27 Jun 2020 06:19 )             30.0     06-27    141  |  101  |  49.0<H>  ----------------------------<  84  4.2   |  23.0  |  4.45<H>    Ca    8.8      27 Jun 2020 06:10      PT/INR - ( 25 Jun 2020 14:19 )   PT: 11.7 sec;   INR: 1.03 ratio               RADIOLOGY & ADDITIONAL TESTS:

## 2020-06-27 NOTE — PROGRESS NOTE ADULT - REASON FOR ADMISSION
low Hb

## 2020-06-27 NOTE — PROGRESS NOTE ADULT - ASSESSMENT
1) ESKD on HD  S/P LAV fistulogram / angioplasty of cephalic vein    Started on HD 06/24; s/p 2 sessions  HD today  Pt to get HD Tues/ Thursday schedule at [facility]     2) anemia of CKD with superimposed GIB  s/p colonoscopy  AMY and IV iron with HD    3) Hypertension  On imdur, hydralazine, nifedipine, and clonidine   UF with HD as tolerated  Continue Torsemide on non HD days    4) CKD- MBD  Monitor Ca++ and phos  Continue calcitriol

## 2020-06-30 LAB — SURGICAL PATHOLOGY STUDY: SIGNIFICANT CHANGE UP

## 2020-07-06 ENCOUNTER — APPOINTMENT (OUTPATIENT)
Dept: ELECTROPHYSIOLOGY | Facility: CLINIC | Age: 85
End: 2020-07-06
Payer: MEDICARE

## 2020-07-06 ENCOUNTER — APPOINTMENT (OUTPATIENT)
Dept: HEMATOLOGY ONCOLOGY | Facility: CLINIC | Age: 85
End: 2020-07-06

## 2020-07-06 ENCOUNTER — APPOINTMENT (OUTPATIENT)
Age: 85
End: 2020-07-06

## 2020-07-06 VITALS
WEIGHT: 150 LBS | TEMPERATURE: 98.6 F | HEART RATE: 66 BPM | OXYGEN SATURATION: 99 % | BODY MASS INDEX: 24.11 KG/M2 | SYSTOLIC BLOOD PRESSURE: 146 MMHG | HEIGHT: 66 IN | DIASTOLIC BLOOD PRESSURE: 58 MMHG

## 2020-07-06 DIAGNOSIS — I49.3 VENTRICULAR PREMATURE DEPOLARIZATION: ICD-10-CM

## 2020-07-06 PROCEDURE — 99213 OFFICE O/P EST LOW 20 MIN: CPT

## 2020-07-06 RX ORDER — GABAPENTIN 100 MG/1
100 CAPSULE ORAL DAILY
Qty: 90 | Refills: 3 | Status: DISCONTINUED | COMMUNITY
Start: 2020-06-15 | End: 2020-07-06

## 2020-07-06 RX ORDER — SEVELAMER CARBONATE 800 MG/1
800 TABLET, FILM COATED ORAL
Qty: 1000 | Refills: 0 | Status: DISCONTINUED | COMMUNITY
Start: 2019-02-28 | End: 2020-07-06

## 2020-07-06 RX ORDER — SODIUM BICARBONATE 650 MG/1
650 TABLET ORAL
Qty: 540 | Refills: 3 | Status: DISCONTINUED | COMMUNITY
Start: 2019-04-03 | End: 2020-07-06

## 2020-07-06 RX ORDER — PANTOPRAZOLE 40 MG/1
TABLET, DELAYED RELEASE ORAL
Refills: 0 | Status: DISCONTINUED | COMMUNITY
End: 2020-07-06

## 2020-07-06 RX ORDER — CILOSTAZOL 100 MG/1
100 TABLET ORAL TWICE DAILY
Qty: 60 | Refills: 3 | Status: DISCONTINUED | COMMUNITY
Start: 2020-05-19 | End: 2020-07-06

## 2020-07-06 NOTE — DISCUSSION/SUMMARY
[FreeTextEntry1] : Mr. King is an 85 year old male with anamolous right coronary artery, non-obstructive CAD, hypertension, hyperlipidemia, HFpEF, moderate AS, carotid disease, CKD 5 with LUE fistula and peripheral artery disease who has had episodes of acute dyspnea with MCOT demonstrating periods of PVCs. Given recurrent symptoms with unclear etiology he is now s/p ILR implantation to further assess symptom/rhythm correlation on 6/10/20.\par \par Since loop implant he has been feeling well. Denies chest pain, shortness of breath, lightheadedness, dizziness, syncope. Tolerating HD ( new to him) twice weekly. \par \par ILR monitoring has revealed no events since implant. \par \par Recommendation:\par \par Site care, procedure for making recordings, and remote follow up reviewed. To continue general cardiology follow up with Dr. Peterson, and patient will return for EP follow up annually or sooner if ILR findings warrant.\par \par Gianna THOMAS-C

## 2020-07-06 NOTE — REVIEW OF SYSTEMS
[Feeling Fatigued] : not feeling fatigued [Headache] : no headache [Dyspnea on exertion] : not dyspnea during exertion [Shortness Of Breath] : no shortness of breath [Palpitations] : no palpitations [Chest Pain] : no chest pain [Lower Ext Edema] : no extremity edema [Dizziness] : no dizziness [Easy Bleeding] : no tendency for easy bleeding

## 2020-07-06 NOTE — HISTORY OF PRESENT ILLNESS
[FreeTextEntry1] : Mr. King is an 85 year old male with anamolous right coronary artery, non-obstructive CAD, hypertension, hyperlipidemia, HFpEF, moderate AS, carotid disease, CKD 5 with LUE fistula and peripheral artery disease who has had episodes of acute dyspnea with MCOT demonstrating periods of PVCs. Given recurrent symptoms with unclear etiology he is now s/p ILR implantation to further assess symptom/rhythm correlation on 6/10/20.\par \par Since loop implant he has been feeling well. Denies chest pain, shortness of breath, lightheadedness, dizziness, syncope. Tolerating HD ( new to him) twice weekly. \par \par ILR monitoring has revealed no events since implant.

## 2020-07-06 NOTE — PHYSICAL EXAM
[General Appearance - Well Developed] : well developed [] : no respiratory distress [General Appearance - Well Nourished] : well nourished [Auscultation Breath Sounds / Voice Sounds] : lungs were clear to auscultation bilaterally [Respiration, Rhythm And Depth] : normal respiratory rhythm and effort [Exaggerated Use Of Accessory Muscles For Inspiration] : no accessory muscle use [Abnormal Walk] : normal gait [Heart Rate And Rhythm] : heart rate and rhythm were normal [Heart Sounds] : normal S1 and S2 [FreeTextEntry1] : Loop implant healing well, without erythema, edema, or exudate

## 2020-07-09 NOTE — DIETITIAN INITIAL EVALUATION ADULT. - WEIGHT CHANGE
Patient called today with results of lung biopsy.  Patient was unavailable and message left with wife.  Will try again tomorrow.      no

## 2020-07-13 ENCOUNTER — APPOINTMENT (OUTPATIENT)
Dept: ELECTROPHYSIOLOGY | Facility: CLINIC | Age: 85
End: 2020-07-13
Payer: MEDICARE

## 2020-07-13 PROCEDURE — G2066: CPT

## 2020-07-13 PROCEDURE — 93298 REM INTERROG DEV EVAL SCRMS: CPT

## 2020-07-27 ENCOUNTER — APPOINTMENT (OUTPATIENT)
Age: 85
End: 2020-07-27

## 2020-08-03 DIAGNOSIS — F41.9 ANXIETY DISORDER, UNSPECIFIED: ICD-10-CM

## 2020-08-03 DIAGNOSIS — F32.9 ANXIETY DISORDER, UNSPECIFIED: ICD-10-CM

## 2020-08-10 ENCOUNTER — APPOINTMENT (OUTPATIENT)
Age: 85
End: 2020-08-10

## 2020-08-14 ENCOUNTER — INPATIENT (INPATIENT)
Facility: HOSPITAL | Age: 85
LOS: 7 days | Discharge: HOME | DRG: 266 | End: 2020-08-22
Attending: THORACIC SURGERY (CARDIOTHORACIC VASCULAR SURGERY) | Admitting: HOSPITALIST
Payer: MEDICARE

## 2020-08-14 VITALS
TEMPERATURE: 99 F | OXYGEN SATURATION: 98 % | RESPIRATION RATE: 20 BRPM | WEIGHT: 149.91 LBS | SYSTOLIC BLOOD PRESSURE: 119 MMHG | HEIGHT: 65 IN | DIASTOLIC BLOOD PRESSURE: 52 MMHG | HEART RATE: 86 BPM

## 2020-08-14 DIAGNOSIS — Z98.890 OTHER SPECIFIED POSTPROCEDURAL STATES: Chronic | ICD-10-CM

## 2020-08-14 DIAGNOSIS — N18.9 CHRONIC KIDNEY DISEASE, UNSPECIFIED: ICD-10-CM

## 2020-08-14 DIAGNOSIS — Z98.62 PERIPHERAL VASCULAR ANGIOPLASTY STATUS: Chronic | ICD-10-CM

## 2020-08-14 DIAGNOSIS — I77.0 ARTERIOVENOUS FISTULA, ACQUIRED: Chronic | ICD-10-CM

## 2020-08-14 LAB
ALBUMIN SERPL ELPH-MCNC: 4.1 G/DL — SIGNIFICANT CHANGE UP (ref 3.3–5.2)
ALP SERPL-CCNC: 77 U/L — SIGNIFICANT CHANGE UP (ref 40–120)
ALT FLD-CCNC: 20 U/L — SIGNIFICANT CHANGE UP
ANION GAP SERPL CALC-SCNC: 15 MMOL/L — SIGNIFICANT CHANGE UP (ref 5–17)
APTT BLD: 35 SEC — SIGNIFICANT CHANGE UP (ref 27.5–35.5)
AST SERPL-CCNC: 21 U/L — SIGNIFICANT CHANGE UP
BILIRUB SERPL-MCNC: 0.6 MG/DL — SIGNIFICANT CHANGE UP (ref 0.4–2)
BLD GP AB SCN SERPL QL: SIGNIFICANT CHANGE UP
BUN SERPL-MCNC: 23 MG/DL — HIGH (ref 8–20)
CALCIUM SERPL-MCNC: 9.5 MG/DL — SIGNIFICANT CHANGE UP (ref 8.6–10.2)
CHLORIDE SERPL-SCNC: 90 MMOL/L — LOW (ref 98–107)
CO2 SERPL-SCNC: 31 MMOL/L — HIGH (ref 22–29)
CREAT SERPL-MCNC: 2.44 MG/DL — HIGH (ref 0.5–1.3)
GLUCOSE SERPL-MCNC: 123 MG/DL — HIGH (ref 70–99)
HCT VFR BLD CALC: 20.8 % — CRITICAL LOW (ref 39–50)
HGB BLD-MCNC: 6.8 G/DL — CRITICAL LOW (ref 13–17)
INR BLD: 1.1 RATIO — SIGNIFICANT CHANGE UP (ref 0.88–1.16)
MCHC RBC-ENTMCNC: 30.5 PG — SIGNIFICANT CHANGE UP (ref 27–34)
MCHC RBC-ENTMCNC: 32.7 GM/DL — SIGNIFICANT CHANGE UP (ref 32–36)
MCV RBC AUTO: 93.3 FL — SIGNIFICANT CHANGE UP (ref 80–100)
PLATELET # BLD AUTO: 222 K/UL — SIGNIFICANT CHANGE UP (ref 150–400)
POTASSIUM SERPL-MCNC: 3.5 MMOL/L — SIGNIFICANT CHANGE UP (ref 3.5–5.3)
POTASSIUM SERPL-SCNC: 3.5 MMOL/L — SIGNIFICANT CHANGE UP (ref 3.5–5.3)
PROT SERPL-MCNC: 7 G/DL — SIGNIFICANT CHANGE UP (ref 6.6–8.7)
PROTHROM AB SERPL-ACNC: 12.7 SEC — SIGNIFICANT CHANGE UP (ref 10.6–13.6)
RBC # BLD: 2.23 M/UL — LOW (ref 4.2–5.8)
RBC # FLD: 16.5 % — HIGH (ref 10.3–14.5)
SARS-COV-2 RNA SPEC QL NAA+PROBE: SIGNIFICANT CHANGE UP
SODIUM SERPL-SCNC: 136 MMOL/L — SIGNIFICANT CHANGE UP (ref 135–145)
WBC # BLD: 6.43 K/UL — SIGNIFICANT CHANGE UP (ref 3.8–10.5)
WBC # FLD AUTO: 6.43 K/UL — SIGNIFICANT CHANGE UP (ref 3.8–10.5)

## 2020-08-14 PROCEDURE — 99222 1ST HOSP IP/OBS MODERATE 55: CPT

## 2020-08-14 PROCEDURE — 99285 EMERGENCY DEPT VISIT HI MDM: CPT

## 2020-08-14 PROCEDURE — 93010 ELECTROCARDIOGRAM REPORT: CPT

## 2020-08-14 PROCEDURE — 71045 X-RAY EXAM CHEST 1 VIEW: CPT | Mod: 26

## 2020-08-14 RX ORDER — ASPIRIN/CALCIUM CARB/MAGNESIUM 324 MG
2 TABLET ORAL
Qty: 0 | Refills: 0 | DISCHARGE

## 2020-08-14 RX ORDER — ASCORBIC ACID 60 MG
1 TABLET,CHEWABLE ORAL
Qty: 0 | Refills: 0 | DISCHARGE

## 2020-08-14 RX ORDER — FUROSEMIDE 40 MG
40 TABLET ORAL DAILY
Refills: 0 | Status: DISCONTINUED | OUTPATIENT
Start: 2020-08-14 | End: 2020-08-14

## 2020-08-14 RX ORDER — FERROUS SULFATE 325(65) MG
1 TABLET ORAL
Qty: 0 | Refills: 0 | DISCHARGE

## 2020-08-14 RX ORDER — CILOSTAZOL 100 MG/1
1 TABLET ORAL
Qty: 0 | Refills: 0 | DISCHARGE

## 2020-08-14 RX ORDER — HYDRALAZINE HCL 50 MG
1 TABLET ORAL
Qty: 0 | Refills: 0 | DISCHARGE

## 2020-08-14 RX ORDER — SODIUM BICARBONATE 1 MEQ/ML
1300 SYRINGE (ML) INTRAVENOUS
Qty: 0 | Refills: 0 | DISCHARGE

## 2020-08-14 RX ORDER — DARBEPOETIN ALFA IN POLYSORBAT 200MCG/0.4
0.5 PEN INJECTOR (ML) SUBCUTANEOUS
Qty: 0 | Refills: 0 | DISCHARGE

## 2020-08-14 RX ORDER — DARBEPOETIN ALFA IN POLYSORBAT 200MCG/0.4
1 PEN INJECTOR (ML) SUBCUTANEOUS
Qty: 0 | Refills: 0 | DISCHARGE

## 2020-08-14 RX ORDER — ISOSORBIDE MONONITRATE 60 MG/1
30 TABLET, EXTENDED RELEASE ORAL DAILY
Refills: 0 | Status: DISCONTINUED | OUTPATIENT
Start: 2020-08-14 | End: 2020-08-21

## 2020-08-14 RX ORDER — ATORVASTATIN CALCIUM 80 MG/1
80 TABLET, FILM COATED ORAL AT BEDTIME
Refills: 0 | Status: DISCONTINUED | OUTPATIENT
Start: 2020-08-14 | End: 2020-08-21

## 2020-08-14 RX ORDER — ATORVASTATIN CALCIUM 80 MG/1
1 TABLET, FILM COATED ORAL
Qty: 0 | Refills: 0 | DISCHARGE

## 2020-08-14 RX ORDER — NIFEDIPINE 30 MG
90 TABLET, EXTENDED RELEASE 24 HR ORAL DAILY
Refills: 0 | Status: DISCONTINUED | OUTPATIENT
Start: 2020-08-14 | End: 2020-08-21

## 2020-08-14 RX ORDER — HYDRALAZINE HCL 50 MG
50 TABLET ORAL
Refills: 0 | Status: DISCONTINUED | OUTPATIENT
Start: 2020-08-14 | End: 2020-08-21

## 2020-08-14 RX ORDER — PANTOPRAZOLE SODIUM 20 MG/1
1 TABLET, DELAYED RELEASE ORAL
Qty: 0 | Refills: 0 | DISCHARGE

## 2020-08-14 RX ORDER — ASPIRIN/CALCIUM CARB/MAGNESIUM 324 MG
1 TABLET ORAL
Qty: 0 | Refills: 0 | DISCHARGE

## 2020-08-14 RX ORDER — HYDRALAZINE HCL 50 MG
0 TABLET ORAL
Qty: 0 | Refills: 0 | DISCHARGE

## 2020-08-14 RX ORDER — FUROSEMIDE 40 MG
60 TABLET ORAL ONCE
Refills: 0 | Status: COMPLETED | OUTPATIENT
Start: 2020-08-14 | End: 2020-08-15

## 2020-08-14 RX ORDER — FLECAINIDE ACETATE 50 MG
1 TABLET ORAL
Qty: 0 | Refills: 0 | DISCHARGE

## 2020-08-14 RX ORDER — ATENOLOL 25 MG/1
0.5 TABLET ORAL
Qty: 0 | Refills: 0 | DISCHARGE

## 2020-08-14 RX ORDER — CHOLECALCIFEROL (VITAMIN D3) 125 MCG
1 CAPSULE ORAL
Qty: 0 | Refills: 0 | DISCHARGE

## 2020-08-14 RX ORDER — NIFEDIPINE 30 MG
60 TABLET, EXTENDED RELEASE 24 HR ORAL AT BEDTIME
Refills: 0 | Status: DISCONTINUED | OUTPATIENT
Start: 2020-08-14 | End: 2020-08-21

## 2020-08-14 RX ORDER — ASPIRIN/CALCIUM CARB/MAGNESIUM 324 MG
81 TABLET ORAL DAILY
Refills: 0 | Status: DISCONTINUED | OUTPATIENT
Start: 2020-08-14 | End: 2020-08-21

## 2020-08-14 NOTE — H&P ADULT - NSHPPHYSICALEXAM_GEN_ALL_CORE
Vital Signs Last 24 Hrs  T(C): 36.9 (15 Aug 2020 00:13), Max: 37 (14 Aug 2020 18:15)  T(F): 98.5 (15 Aug 2020 00:13), Max: 98.6 (14 Aug 2020 18:15)  HR: 94 (15 Aug 2020 00:13) (86 - 94)  BP: 156/61 (15 Aug 2020 00:13) (119/52 - 156/61)  BP(mean): --  RR: 18 (15 Aug 2020 00:13) (18 - 20)  SpO2: 98% (15 Aug 2020 00:13) (98% - 98%)    GENERAL:  Tired and pale appearing elderly male, not in acute distress  EYES:  Clear conjunctiva, extraocular movement intact  ENT: Moist mucous membranes  RESP:  Non-labored breathing pattern, bibasilar crackles  CV: Regular rate and rhythm, no murmurs appreciated, bilateral trace leg edema  GI: Soft, non-tender, non-distended  NEURO: Awake, alert, conversant, upper and lower extremity strength 5/5, light touch sensation grossly intact  PSYCH: Calm, cooperative  SKIN: No rash or lesions, warm and dry

## 2020-08-14 NOTE — ED PROVIDER NOTE - CONSTITUTIONAL APPEARANCE HYGIENE, MLM
Surgery Drop Counseling:  I have prescribed Besivance, Prolensa and Pred Forte for use as directed before and after cataract surgery. ILL APPEARING/pale

## 2020-08-14 NOTE — H&P ADULT - ASSESSMENT
86yoM hx ESRD on HD on M/F only, HFpEF, HTN, AS, prior GI bleed from ulcerated polyps and colonic AVMs presenting with worsening weakness fatigue after missing HD session with epogen injection once this week found to have worsening anemia on outpatient labs, also with worsening AS     Acute on chronic anemia in setting of ESRD  -No recent or active bleeding, likely related to renal insufficiency and missed epogen injection  -Admission Hgb 6.8, baseline Hgb appears to be 8-10  -ED ordered 2pRBC, will only give 1 pRBC for now given hx ESRD/CHF  -Will give IV Lasix 60mg x1 after pRBC  -Check post-transfusion CBC 2hr after complete  -Trend CBC  -Nephrology consulted to coordinate inpatient HD    Elevated troponin   -Chronically elevated sine 6/2020, likely due to ESRD  -EKG shows NSR and T wave flattening in III and V2  -Will repeat to ensure not up-trending    AS  -Pt with chronic dyspnea, deemed secondary to worsening AS  -Recent TTE done today on 8/14, try to obtain report from [facility]  -Seen by cardiology in ED, spoke to Dr. Jaime at bedside who said their team will consult CT surgery about TAVR work up    HFpEF with mild acute decompensation   -CXR with mild central congestion, but improvement from 6/23 study  -Pt appears mildly hypovolemic on exam with bibasilar crackles, trace edema and is receiving pRBC  -IV dose of Lasix x1 as above  -PO torsemide on non-HD days resumed  -Cardiology following    CAD  -ASA, statin resumed    HTN  -Hydralazine, nifedipine, imdur resumed    Prophylactic measure  -Intermittent pneumatic compressions, no pharmacologic AC given hx GI bleeding from polyps, AVMS, recent +FOBT

## 2020-08-14 NOTE — ED PROVIDER NOTE - OBJECTIVE STATEMENT
87 yo male pmh aortic stenosis, ESRD, chf ,    CAD , brought to ed with increasing weakness and fatique; as per family, pt with lab work noted for hgb 7.1 and sent by Dr Dennis for transfusion; pt also recently seen Dr Peterson of cardiology with Echo noted to have worsening AS. pt with baseline hgb 9 - 10;  pt denies any chest pain, nausea, or vomiting

## 2020-08-14 NOTE — ED ADULT NURSE NOTE - OBJECTIVE STATEMENT
a&ox4, poor historian - sent s/p " incomplete  hemodialysis due to his labs and h/h being low so need a transfusion"  denies further symptoms, +1 edema ble  + malaise/weakness fistula left arm

## 2020-08-14 NOTE — H&P ADULT - NSHPLABSRESULTS_GEN_ALL_CORE
6.8    6.43  )-----------( 222      ( 14 Aug 2020 19:14 )             20.8       08-14    136  |  90<L>  |  23.0<H>  ----------------------------<  123<H>  3.5   |  31.0<H>  |  2.44<H>    Ca    9.5      14 Aug 2020 19:14    TPro  7.0  /  Alb  4.1  /  TBili  0.6  /  DBili  x   /  AST  21  /  ALT  20  /  AlkPhos  77  08-14

## 2020-08-14 NOTE — ED ADULT NURSE NOTE - FAMILY HISTORY
Family history of premature CAD     FH: type 2 diabetes     Grandparent  Still living? Unknown  Family history of cancer, Age at diagnosis: Age Unknown  Family history of lung cancer, Age at diagnosis: Age Unknown

## 2020-08-14 NOTE — H&P ADULT - NSHPSOCIALHISTORY_GEN_ALL_CORE
, lives at home with wife  Independently, no home health aides  No hx smoking  Denies EtOH or drug use

## 2020-08-14 NOTE — ED ADULT NURSE NOTE - CHPI ED NUR SYMPTOMS NEG
no loss of consciousness/no chills/no headache/no back pain/no decreased eating/drinking/no nausea/no dizziness/no fever/no pain/no vomiting

## 2020-08-14 NOTE — ED ADULT NURSE NOTE - NSIMPLEMENTINTERV_GEN_ALL_ED
Implemented All Fall Risk Interventions:  [place] to call system. Call bell, personal items and telephone within reach. Instruct patient to call for assistance. Room bathroom lighting operational. Non-slip footwear when patient is off stretcher. Physically safe environment: no spills, clutter or unnecessary equipment. Stretcher in lowest position, wheels locked, appropriate side rails in place. Provide visual cue, wrist band, yellow gown, etc. Monitor gait and stability. Monitor for mental status changes and reorient to person, place, and time. Review medications for side effects contributing to fall risk. Reinforce activity limits and safety measures with patient and family.

## 2020-08-14 NOTE — H&P ADULT - NSICDXPASTMEDICALHX_GEN_ALL_CORE_FT
PAST MEDICAL HISTORY:  Anemia     Aortic stenosis     Arrhythmia     AV block, 1st degree     CAD (coronary artery disease)     DM (diabetes mellitus)     RICHARDS (dyspnea on exertion)     ESRD on dialysis HD on Mondays and Fridays    GI bleeding due to ulcerated polyps and colonic AVMs    HTN (hypertension)     Risk factors for obstructive sleep apnea     VT (ventricular tachycardia)

## 2020-08-14 NOTE — ED ADULT TRIAGE NOTE - CHIEF COMPLAINT QUOTE
Pt was sent from Dr. Luciano ( nephrologist )  for low hemoglobin 7.2 . Pt appears pale . Hx of blood transfusion in the past  Pt gets HD M/W/F just had HD . Denies chest pain

## 2020-08-14 NOTE — ED PROVIDER NOTE - CARE PLAN
Principal Discharge DX:	Anemia due to chronic kidney disease, unspecified CKD stage  Secondary Diagnosis:	Acute congestive heart failure, unspecified heart failure type

## 2020-08-14 NOTE — H&P ADULT - HISTORY OF PRESENT ILLNESS
86yoM hx ESRD on HD on M/F only, HFpEF, HTN, AS, prior GI bleed from ulcerated polyps and colonic AVMs, presenting with weakness and being admitted for acute on chronic anemia.  Daughter Vanessa Douglas who is RN manager at [employer] at bedside providing most of the hx.  Pt was recently started on HD in 6/2020 and receives it twice weekly due to pt preference.  He was unable to get full session of HD and his epogen injection on Monday this week due to ‘infiltration of his fistula’, but was able to get his full session of HD earlier today because his fistula became functional again.  Daughter was told ‘they removed extra fluid’ but she’s unsure of how much liters was taken off.  She was notified after HD that his blood counts were low with Hgb of 7.1 and she has noticed that ‘he looks weaker’ and hasn’t been as independently as he normally is recently.   She spoke to his nephrologist who advised that pt be taken to hospital given his recent his symptoms and worsening anemia. Pt also has been having chronic exertional dyspnea without significant relief with HD sessions that his cardiologist believes may be due to his worsening A and daughter would like inpatient work up of TAVR.   Per daughter pt was last seen in cardiologist office earlier this AM and had TTE that showed worsening AS.  Pt denies any active bleeding including melena, hematochezia, hematuria.  He also denies any fever, chills, chest pain, cough, abdominal pain.  Pt still produces urine and is on a diuretic on his non-HD days which day reports had just been increased today as per his cardiologist.  On admission, Hgb 6.8. 86yoM hx ESRD on HD on M/F only, HFpEF, HTN, AS, prior GI bleed from ulcerated polyps and colonic AVMs, presenting with weakness and being admitted for acute on chronic anemia.  Daughter Vanessa Douglas who is RN manager at [employer] at bedside providing most of the hx.  Pt was recently started on HD in 6/2020 and receives it twice weekly due to pt preference.  He was unable to get full session of HD and his epogen injection on Monday this week due to ‘infiltration of his fistula’, but was able to get his full session of HD earlier today because his fistula became functional again.  Daughter was told ‘they removed extra fluid’ but she’s unsure of how much liters was taken off.  She was notified after HD that his blood counts were low with Hgb of 7.1 and she has noticed that ‘he looks weaker’ and hasn’t been as independent as he normally is recently.   She spoke to his nephrologist who advised that pt be taken to hospital given his recent his symptoms and worsening anemia. Pt also has been having chronic exertional dyspnea without significant relief with HD sessions that his cardiologist believes may be due to his worsening A and daughter would like inpatient work up of TAVR.   Per daughter pt was last seen in cardiologist office earlier this AM and had TTE that showed worsening AS.  Pt denies any active bleeding including melena, hematochezia, hematuria.  He also denies any fever, chills, chest pain, cough, abdominal pain.  Pt still produces urine and is on a diuretic on his non-HD days which day reports had just been increased today as per his cardiologist.  On admission, Hgb 6.8.

## 2020-08-15 LAB
ANION GAP SERPL CALC-SCNC: 17 MMOL/L — SIGNIFICANT CHANGE UP (ref 5–17)
BLD GP AB SCN SERPL QL: SIGNIFICANT CHANGE UP
BUN SERPL-MCNC: 42 MG/DL — HIGH (ref 8–20)
CALCIUM SERPL-MCNC: 9.8 MG/DL — SIGNIFICANT CHANGE UP (ref 8.6–10.2)
CHLORIDE SERPL-SCNC: 93 MMOL/L — LOW (ref 98–107)
CK SERPL-CCNC: 80 U/L — SIGNIFICANT CHANGE UP (ref 30–200)
CO2 SERPL-SCNC: 30 MMOL/L — HIGH (ref 22–29)
CREAT SERPL-MCNC: 3.43 MG/DL — HIGH (ref 0.5–1.3)
GLUCOSE SERPL-MCNC: 122 MG/DL — HIGH (ref 70–99)
HCT VFR BLD CALC: 25.5 % — LOW (ref 39–50)
HGB BLD-MCNC: 8.2 G/DL — LOW (ref 13–17)
MCHC RBC-ENTMCNC: 29.6 PG — SIGNIFICANT CHANGE UP (ref 27–34)
MCHC RBC-ENTMCNC: 32.2 GM/DL — SIGNIFICANT CHANGE UP (ref 32–36)
MCV RBC AUTO: 92.1 FL — SIGNIFICANT CHANGE UP (ref 80–100)
PLATELET # BLD AUTO: 204 K/UL — SIGNIFICANT CHANGE UP (ref 150–400)
POTASSIUM SERPL-MCNC: 4.1 MMOL/L — SIGNIFICANT CHANGE UP (ref 3.5–5.3)
POTASSIUM SERPL-SCNC: 4.1 MMOL/L — SIGNIFICANT CHANGE UP (ref 3.5–5.3)
RBC # BLD: 2.77 M/UL — LOW (ref 4.2–5.8)
RBC # FLD: 16.3 % — HIGH (ref 10.3–14.5)
SARS-COV-2 IGG SERPL QL IA: NEGATIVE — SIGNIFICANT CHANGE UP
SARS-COV-2 IGM SERPL IA-ACNC: 0.25 RATIO — SIGNIFICANT CHANGE UP
SODIUM SERPL-SCNC: 140 MMOL/L — SIGNIFICANT CHANGE UP (ref 135–145)
TROPONIN T SERPL-MCNC: 0.15 NG/ML — HIGH (ref 0–0.06)
WBC # BLD: 7.55 K/UL — SIGNIFICANT CHANGE UP (ref 3.8–10.5)
WBC # FLD AUTO: 7.55 K/UL — SIGNIFICANT CHANGE UP (ref 3.8–10.5)

## 2020-08-15 PROCEDURE — 99233 SBSQ HOSP IP/OBS HIGH 50: CPT

## 2020-08-15 RX ORDER — ERYTHROPOIETIN 10000 [IU]/ML
12000 INJECTION, SOLUTION INTRAVENOUS; SUBCUTANEOUS
Refills: 0 | Status: DISCONTINUED | OUTPATIENT
Start: 2020-08-15 | End: 2020-08-20

## 2020-08-15 RX ORDER — FUROSEMIDE 40 MG
40 TABLET ORAL ONCE
Refills: 0 | Status: COMPLETED | OUTPATIENT
Start: 2020-08-15 | End: 2020-08-15

## 2020-08-15 RX ADMIN — Medication 40 MILLIGRAM(S): at 18:38

## 2020-08-15 RX ADMIN — Medication 20 MILLIGRAM(S): at 05:15

## 2020-08-15 RX ADMIN — Medication 20 MILLIGRAM(S): at 17:47

## 2020-08-15 RX ADMIN — ATORVASTATIN CALCIUM 80 MILLIGRAM(S): 80 TABLET, FILM COATED ORAL at 21:14

## 2020-08-15 RX ADMIN — Medication 1 TABLET(S): at 08:00

## 2020-08-15 RX ADMIN — Medication 50 MILLIGRAM(S): at 17:47

## 2020-08-15 RX ADMIN — Medication 60 MILLIGRAM(S): at 01:19

## 2020-08-15 RX ADMIN — Medication 90 MILLIGRAM(S): at 05:16

## 2020-08-15 RX ADMIN — Medication 60 MILLIGRAM(S): at 00:22

## 2020-08-15 RX ADMIN — Medication 50 MILLIGRAM(S): at 05:16

## 2020-08-15 RX ADMIN — Medication 60 MILLIGRAM(S): at 21:14

## 2020-08-15 RX ADMIN — ISOSORBIDE MONONITRATE 30 MILLIGRAM(S): 60 TABLET, EXTENDED RELEASE ORAL at 08:00

## 2020-08-15 RX ADMIN — Medication 81 MILLIGRAM(S): at 08:00

## 2020-08-15 RX ADMIN — ERYTHROPOIETIN 12000 UNIT(S): 10000 INJECTION, SOLUTION INTRAVENOUS; SUBCUTANEOUS at 11:30

## 2020-08-15 NOTE — PROGRESS NOTE ADULT - ASSESSMENT
86yoM hx ESRD on HD on M/F only, HFpEF, HTN, AS, prior GI bleed from ulcerated polyps and colonic AVMs presenting with worsening weakness fatigue after missing HD session with epogen injection once this week found to have worsening anemia on outpatient labs, also with worsening AS     #Acute on chronic anemia in setting of ESRD  - No recent or active bleeding likely related to renal insufficiency and missed epogen injection  - baseline Hgb appears to be 8-10  - s/p prbc x 1 will order 2nd unit w/ lasix after   - monitor h and h   - Nephrology consult appreciated     #ESRD on HD   - nephrology consult appreciated   - HD per nephro    #Elevated troponin   - Chronically elevated sine 6/2020, likely due to ESRD    #AS  - Pt with chronic dyspnea, deemed secondary to worsening AS  - CT surgery consult pending - d/w CT Surgery PA CT Angio TAVR protocol ordered, repeat echo ordered   - cardio consult appreciated     #HFpEF with mild acute decompensation   - CXR with mild central congestion, but improvement from 6/23 study  - PO torsemide on non-HD days   - Cardiology consult appreciated    #CAD  - ASA, statin     #HTN- Essential  - Hydralazine, nifedipine, imdur resumed  - monitor blood pressure     #DVT prophylaxis  - venodynes  - no chemica a/c given hx of gi bleed from polyps, avms, recent fobt+    patient hospital course to date d/w patient daughter Vanessa all questions answered.

## 2020-08-15 NOTE — PROGRESS NOTE ADULT - SUBJECTIVE AND OBJECTIVE BOX
Patient is a 86y old  Male who presents with a chief complaint of acute on chronic anemia, worsening AS (15 Aug 2020 11:32)    Patient seen and examined at bedside.     ALLERGIES:  Plavix (Hives)  Toprol-XL (Rash)    MEDICATIONS  (STANDING):  aspirin enteric coated 81 milliGRAM(s) Oral daily  atorvastatin 80 milliGRAM(s) Oral at bedtime  epoetin juan-epbx (RETACRIT) Injectable 59785 Unit(s) SubCutaneous <User Schedule>  furosemide   Injectable 40 milliGRAM(s) IV Push once  hydrALAZINE 50 milliGRAM(s) Oral two times a day  isosorbide   mononitrate ER Tablet (IMDUR) 30 milliGRAM(s) Oral daily  multivitamin 1 Tablet(s) Oral daily  NIFEdipine XL 60 milliGRAM(s) Oral at bedtime  NIFEdipine XL 90 milliGRAM(s) Oral daily  torsemide 20 milliGRAM(s) Oral <User Schedule>    MEDICATIONS  (PRN):    Vital Signs Last 24 Hrs  T(F): 98.8 (15 Aug 2020 10:02), Max: 98.8 (15 Aug 2020 10:02)  HR: 88 (15 Aug 2020 10:02) (86 - 97)  BP: 108/66 (15 Aug 2020 10:02) (108/66 - 156/61)  RR: 18 (15 Aug 2020 10:02) (18 - 20)  SpO2: 98% (15 Aug 2020 10:02) (98% - 98%)  I&O's Summary    15 Aug 2020 07:01  -  15 Aug 2020 11:53  --------------------------------------------------------  IN: 240 mL / OUT: 0 mL / NET: 240 mL    PHYSICAL EXAM:  General: NAD, alert  ENT: MMM, no thrush  Neck: Supple, No JVD  Lungs: +crackles  Cardio:+s1/s2 =trace edema b/l le  Abdomen: Soft, Nontender, Nondistended; Bowel sounds present  Extremities: No calf tenderness    LABS:                        8.2    7.55  )-----------( 204      ( 15 Aug 2020 04:32 )             25.5     08-15    140  |  93  |  42.0  ----------------------------<  122  4.1   |  30.0  |  3.43    Ca    9.8      15 Aug 2020 04:32    TPro  7.0  /  Alb  4.1  /  TBili  0.6  /  DBili  x   /  AST  21  /  ALT  20  /  AlkPhos  77  08-14    eGFR if Non African American: 15 mL/min/1.73M2 (08-15-20 @ 04:32)  eGFR if African American: 18 mL/min/1.73M2 (08-15-20 @ 04:32)    PT/INR - ( 14 Aug 2020 19:14 )   PT: 12.7 sec;   INR: 1.10 ratio    PTT - ( 14 Aug 2020 19:14 )  PTT:35.0 sec    CARDIAC MARKERS ( 15 Aug 2020 04:32 )  x     / 0.15 ng/mL / 80 U/L / x     / x      CARDIAC MARKERS ( 14 Aug 2020 19:14 )  x     / 0.15 ng/mL / x     / x     / x        RADIOLOGY & ADDITIONAL TESTS:  < from: Xray Chest 1 View- PORTABLE-Urgent (08.14.20 @ 19:25) >  INTERPRETATION:  AP chest on August 14, 2020 at 7:14 PM. Patient has weakness patient has renal insufficiency, hypertension, and diabetes. There is history of CHF and anemia of chronic renal disease. Patient has blood loss anemia and is being considered for transfusion. Patient is on dialysis.  Heart is magnified by technique. Loop recorder over the left chest again noted.  The mild central congestive findings are again seen but there is improvement from June 23 in this regard.  < end of copied text >    Care Discussed with Consultants/Other Providers:   CT Surgery   Nephrology Patient is a 86y old  Male who presents with a chief complaint of acute on chronic anemia, worsening AS (15 Aug 2020 11:32)    Patient seen and examined at bedside.     ALLERGIES:  Plavix (Hives)  Toprol-XL (Rash)    MEDICATIONS  (STANDING):  aspirin enteric coated 81 milliGRAM(s) Oral daily  atorvastatin 80 milliGRAM(s) Oral at bedtime  epoetin juan-epbx (RETACRIT) Injectable 31310 Unit(s) SubCutaneous <User Schedule>  furosemide   Injectable 40 milliGRAM(s) IV Push once  hydrALAZINE 50 milliGRAM(s) Oral two times a day  isosorbide   mononitrate ER Tablet (IMDUR) 30 milliGRAM(s) Oral daily  multivitamin 1 Tablet(s) Oral daily  NIFEdipine XL 60 milliGRAM(s) Oral at bedtime  NIFEdipine XL 90 milliGRAM(s) Oral daily  torsemide 20 milliGRAM(s) Oral <User Schedule>    MEDICATIONS  (PRN):    Vital Signs Last 24 Hrs  T(F): 98.8 (15 Aug 2020 10:02), Max: 98.8 (15 Aug 2020 10:02)  HR: 88 (15 Aug 2020 10:02) (86 - 97)  BP: 108/66 (15 Aug 2020 10:02) (108/66 - 156/61)  RR: 18 (15 Aug 2020 10:02) (18 - 20)  SpO2: 98% (15 Aug 2020 10:02) (98% - 98%)  I&O's Summary    15 Aug 2020 07:01  -  15 Aug 2020 11:53  --------------------------------------------------------  IN: 240 mL / OUT: 0 mL / NET: 240 mL    PHYSICAL EXAM:  General: NAD, alert  ENT: MMM, no thrush  Neck: Supple, No JVD  Lungs: +crackles  Cardio:+s1/s2 trace edema b/l le  Abdomen: Soft, Nontender, Nondistended; Bowel sounds present  Extremities: No calf tenderness    LABS:                        8.2    7.55  )-----------( 204      ( 15 Aug 2020 04:32 )             25.5     08-15    140  |  93  |  42.0  ----------------------------<  122  4.1   |  30.0  |  3.43    Ca    9.8      15 Aug 2020 04:32    TPro  7.0  /  Alb  4.1  /  TBili  0.6  /  DBili  x   /  AST  21  /  ALT  20  /  AlkPhos  77  08-14    eGFR if Non African American: 15 mL/min/1.73M2 (08-15-20 @ 04:32)  eGFR if African American: 18 mL/min/1.73M2 (08-15-20 @ 04:32)    PT/INR - ( 14 Aug 2020 19:14 )   PT: 12.7 sec;   INR: 1.10 ratio    PTT - ( 14 Aug 2020 19:14 )  PTT:35.0 sec    CARDIAC MARKERS ( 15 Aug 2020 04:32 )  x     / 0.15 ng/mL / 80 U/L / x     / x      CARDIAC MARKERS ( 14 Aug 2020 19:14 )  x     / 0.15 ng/mL / x     / x     / x        RADIOLOGY & ADDITIONAL TESTS:  < from: Xray Chest 1 View- PORTABLE-Urgent (08.14.20 @ 19:25) >  INTERPRETATION:  AP chest on August 14, 2020 at 7:14 PM. Patient has weakness patient has renal insufficiency, hypertension, and diabetes. There is history of CHF and anemia of chronic renal disease. Patient has blood loss anemia and is being considered for transfusion. Patient is on dialysis.  Heart is magnified by technique. Loop recorder over the left chest again noted.  The mild central congestive findings are again seen but there is improvement from June 23 in this regard.  < end of copied text >    Care Discussed with Consultants/Other Providers:   CT Surgery   Nephrology

## 2020-08-15 NOTE — PROGRESS NOTE ADULT - SUBJECTIVE AND OBJECTIVE BOX
[facility]                                   [address] [place] [address]                                                      PHONE: [phone]                                                         FAX: [phone]  [email or web address]  ---------------------------------------------------------------------------------------------------------------------------------    Overnight events/patient complaints: no concerns currently.     INTERPRETATION OF TELEMETRY (personally reviewed)    ECG (Independent visualization): NSR with first degree AV block and LVH with repolarization    ECHOCARDIOGRAM today showed concentric LVH preserved LV systolic function, moderate right ventricular hypertrophy, mild mitral stenosis, moderate MR, severe aortic stenosis with a calculated valve area of 0.8 cm² and a dimensionless index of 0.27 it was small bilateral pleural effusions inferior vena cava was dilated with depressed inspiratory collapse, right ventricular pressures were 60-65    I&O's Summary    15 Aug 2020 07:01  -  15 Aug 2020 11:32  --------------------------------------------------------  IN: 240 mL / OUT: 0 mL / NET: 240 mL        PAST MEDICAL & SURGICAL HISTORY:  GI bleeding: due to ulcerated polyps and colonic AVMs  Aortic stenosis  ESRD on dialysis: HD on Mondays and Fridays  Risk factors for obstructive sleep apnea  Anemia  RICHARDS (dyspnea on exertion)  VT (ventricular tachycardia)  HTN (hypertension)  CAD (coronary artery disease)  Arrhythmia  AV block, 1st degree  DM (diabetes mellitus)  H/O carotid endarterectomy: Right  A-V fistula: left arm 5/2017  H/O angioplasty: 2013,  no  intervention  H/O left knee surgery  H/O circumcision: at  age  65      Plavix (Hives)  Toprol-XL (Rash)    MEDICATIONS  (STANDING):  aspirin enteric coated 81 milliGRAM(s) Oral daily  atorvastatin 80 milliGRAM(s) Oral at bedtime  epoetin juan-epbx (RETACRIT) Injectable 26265 Unit(s) SubCutaneous <User Schedule>  hydrALAZINE 50 milliGRAM(s) Oral two times a day  isosorbide   mononitrate ER Tablet (IMDUR) 30 milliGRAM(s) Oral daily  multivitamin 1 Tablet(s) Oral daily  NIFEdipine XL 60 milliGRAM(s) Oral at bedtime  NIFEdipine XL 90 milliGRAM(s) Oral daily  torsemide 20 milliGRAM(s) Oral <User Schedule>    MEDICATIONS  (PRN):      Vital Signs Last 24 Hrs  T(C): 37.1 (15 Aug 2020 10:02), Max: 37.1 (15 Aug 2020 10:02)  T(F): 98.8 (15 Aug 2020 10:02), Max: 98.8 (15 Aug 2020 10:02)  HR: 88 (15 Aug 2020 10:02) (86 - 97)  BP: 108/66 (15 Aug 2020 10:02) (108/66 - 156/61)  BP(mean): --  RR: 18 (15 Aug 2020 10:02) (18 - 20)  SpO2: 98% (15 Aug 2020 10:02) (98% - 98%)  ICU Vital Signs Last 24 Hrs    PHYSICAL EXAM:  General: Appears well developed, well nourished alert and cooperative.  HEENT: Head; normocephalic, atraumatic.Pupils reactive, cornea wnl. Neck supple, no nodes adenopathy, no JVD  CARDIOVASCULAR: Normal S1 and S2, 3/6 DEANA, no rub, gallop or lift.   LUNGS: No rales, rhonchi or wheeze. Normal breath sounds bilaterally.  ABDOMEN: Soft, nontender without mass or organomegaly. bowel sounds normoactive.  EXTREMITIES: No clubbing, cyanosis or (+) edema.   SKIN: warm and dry with normal turgor.  NEURO: Alert/oriented x 3/normal motor exam.   PSYCH: normal affect.        LABS:                        8.2    7.55  )-----------( 204      ( 15 Aug 2020 04:32 )             25.5     08-15    140  |  93<L>  |  42.0<H>  ----------------------------<  122<H>  4.1   |  30.0<H>  |  3.43<H>    Ca    9.8      15 Aug 2020 04:32    TPro  7.0  /  Alb  4.1  /  TBili  0.6  /  DBili  x   /  AST  21  /  ALT  20  /  AlkPhos  77  08-14    CHRISTIANO ELIZABETH  CARDIAC MARKERS ( 15 Aug 2020 04:32 )  x     / 0.15 ng/mL / 80 U/L / x     / x      CARDIAC MARKERS ( 14 Aug 2020 19:14 )  x     / 0.15 ng/mL / x     / x     / x          PT/INR - ( 14 Aug 2020 19:14 )   PT: 12.7 sec;   INR: 1.10 ratio         PTT - ( 14 Aug 2020 19:14 )  PTT:35.0 sec      RADIOLOGY & ADDITIONAL STUDIES:    Assessment and Plan   86y Male with prior history of anomalous right coronary, nonobstructive CAD, hypertension, hyperlipidemia, history of ventricular arrhythmias, history of diastolic CHF, Aortic stenosis, carotid disease, renal failure stage V on HD 2 times a week with persistent volume overload  with shortness of breath, RICHARDS with anemia and worsening AS    Severe AS  - TAVR work up given symptomatic AS  - CTS evaluation with Dr. Lilly for TAVR w/u  - Telemetry monitoring    HTN  - BP controlled on current meds    Dyslipidemia  - Continue statin    Shortness of breath  - Likely multifactorial including anemia, AS, ESRD  - Agree with iv lasix, additionally volume management with HD   - PRBC transfusion    ESRD on HD  - FU renal     Thank you for allowing [facility] to participate in the care of this patient.  Please feel free to call with any questions or concerns.

## 2020-08-16 DIAGNOSIS — N18.6 END STAGE RENAL DISEASE: ICD-10-CM

## 2020-08-16 DIAGNOSIS — I35.0 NONRHEUMATIC AORTIC (VALVE) STENOSIS: ICD-10-CM

## 2020-08-16 LAB
ANION GAP SERPL CALC-SCNC: 17 MMOL/L — SIGNIFICANT CHANGE UP (ref 5–17)
BUN SERPL-MCNC: 73 MG/DL — HIGH (ref 8–20)
CALCIUM SERPL-MCNC: 9.6 MG/DL — SIGNIFICANT CHANGE UP (ref 8.6–10.2)
CHLORIDE SERPL-SCNC: 94 MMOL/L — LOW (ref 98–107)
CO2 SERPL-SCNC: 28 MMOL/L — SIGNIFICANT CHANGE UP (ref 22–29)
CREAT SERPL-MCNC: 5 MG/DL — HIGH (ref 0.5–1.3)
GLUCOSE SERPL-MCNC: 166 MG/DL — HIGH (ref 70–99)
HCT VFR BLD CALC: 22.4 % — LOW (ref 39–50)
HGB BLD-MCNC: 7.5 G/DL — LOW (ref 13–17)
MCHC RBC-ENTMCNC: 30.4 PG — SIGNIFICANT CHANGE UP (ref 27–34)
MCHC RBC-ENTMCNC: 33.5 GM/DL — SIGNIFICANT CHANGE UP (ref 32–36)
MCV RBC AUTO: 90.7 FL — SIGNIFICANT CHANGE UP (ref 80–100)
PLATELET # BLD AUTO: 205 K/UL — SIGNIFICANT CHANGE UP (ref 150–400)
POTASSIUM SERPL-MCNC: 3.5 MMOL/L — SIGNIFICANT CHANGE UP (ref 3.5–5.3)
POTASSIUM SERPL-SCNC: 3.5 MMOL/L — SIGNIFICANT CHANGE UP (ref 3.5–5.3)
RBC # BLD: 2.47 M/UL — LOW (ref 4.2–5.8)
RBC # FLD: 16.9 % — HIGH (ref 10.3–14.5)
SODIUM SERPL-SCNC: 139 MMOL/L — SIGNIFICANT CHANGE UP (ref 135–145)
WBC # BLD: 7.9 K/UL — SIGNIFICANT CHANGE UP (ref 3.8–10.5)
WBC # FLD AUTO: 7.9 K/UL — SIGNIFICANT CHANGE UP (ref 3.8–10.5)

## 2020-08-16 PROCEDURE — 93306 TTE W/DOPPLER COMPLETE: CPT | Mod: 26

## 2020-08-16 PROCEDURE — 99233 SBSQ HOSP IP/OBS HIGH 50: CPT

## 2020-08-16 PROCEDURE — 99231 SBSQ HOSP IP/OBS SF/LOW 25: CPT

## 2020-08-16 RX ORDER — FUROSEMIDE 40 MG
40 TABLET ORAL ONCE
Refills: 0 | Status: COMPLETED | OUTPATIENT
Start: 2020-08-16 | End: 2020-08-16

## 2020-08-16 RX ORDER — SERTRALINE 25 MG/1
25 TABLET, FILM COATED ORAL AT BEDTIME
Refills: 0 | Status: DISCONTINUED | OUTPATIENT
Start: 2020-08-16 | End: 2020-08-21

## 2020-08-16 RX ADMIN — ATORVASTATIN CALCIUM 80 MILLIGRAM(S): 80 TABLET, FILM COATED ORAL at 21:18

## 2020-08-16 RX ADMIN — Medication 50 MILLIGRAM(S): at 05:11

## 2020-08-16 RX ADMIN — Medication 40 MILLIGRAM(S): at 18:01

## 2020-08-16 RX ADMIN — Medication 60 MILLIGRAM(S): at 21:18

## 2020-08-16 RX ADMIN — Medication 20 MILLIGRAM(S): at 05:11

## 2020-08-16 RX ADMIN — Medication 81 MILLIGRAM(S): at 08:01

## 2020-08-16 RX ADMIN — Medication 20 MILLIGRAM(S): at 17:29

## 2020-08-16 RX ADMIN — Medication 90 MILLIGRAM(S): at 05:11

## 2020-08-16 RX ADMIN — Medication 50 MILLIGRAM(S): at 17:29

## 2020-08-16 RX ADMIN — ISOSORBIDE MONONITRATE 30 MILLIGRAM(S): 60 TABLET, EXTENDED RELEASE ORAL at 08:02

## 2020-08-16 RX ADMIN — SERTRALINE 25 MILLIGRAM(S): 25 TABLET, FILM COATED ORAL at 21:18

## 2020-08-16 RX ADMIN — Medication 1 TABLET(S): at 08:01

## 2020-08-16 NOTE — CONSULT NOTE ADULT - SUBJECTIVE AND OBJECTIVE BOX
[facility], [facility]                              [address] [place] [address]                                                 PHONE: [phone]                                                 FAX: [phone]  ------------------------------------------------------------------------------------------------    86y Male with past medical history as under sent to ED with increasing weakness and fatigue as per family, pt with lab work noted for hgb 7.1 and sent by Dr Dennis for transfusion; pt also recently seen Dr Peterson of cardiology with Echo noted to have worsening AS. Pt with baseline hgb 9 - 10; He reports symptoms of shortness of breath, RICHARDS and orthopnea.  At the time of evaluation, pt reports feeling fatigued and tired. He is getting PRBC transfusion    PAST MEDICAL & SURGICAL HISTORY:  Risk factors for obstructive sleep apnea  Anemia  RICHARDS (dyspnea on exertion)  VT (ventricular tachycardia)  HTN (hypertension)  CAD (coronary artery disease)  CKD (chronic kidney disease): stage IV  Arrhythmia  AV block, 1st degree  DM (diabetes mellitus)  H/O carotid endarterectomy: Right  A-V fistula: left arm 5/2017  H/O angioplasty: 2013,  no  intervention  H/O left knee surgery  H/O circumcision: at  age  65      Plavix (Hives)  Toprol-XL (Rash)      Review of Systems:  Positive for shortness of breath, dyspnea on exertion  Rest of the systems were reviewed and was negative.     Family history:   No pertinent family history in first degree relative of early CAD, sudden cardiac death or  congenital heart disease    Social History:  No smoking   No alcohol  No other drug use    Vital Signs Last 24 Hrs  T(C): 37 (14 Aug 2020 18:15), Max: 37 (14 Aug 2020 18:15)  T(F): 98.6 (14 Aug 2020 18:15), Max: 98.6 (14 Aug 2020 18:15)  HR: 86 (14 Aug 2020 18:15) (86 - 86)  BP: 119/52 (14 Aug 2020 18:15) (119/52 - 119/52)  BP(mean): --  RR: 20 (14 Aug 2020 18:15) (20 - 20)  SpO2: 98% (14 Aug 2020 18:15) (98% - 98%)    PHYSICAL EXAM:  Constitutional: Appears well developed, well nourished; alert and co-operative  HEENT:     Head: Normocephalic and atraumatic  Eyes: Conjunctiva normal, No scleral icterus  Neck: Supple, No JVD  Mouth/Throat: Mucous membranes are normal. Mucosa are not pale or dry.  Cardiovascular: regular S1, S2 + ESM  Respiratory: Lungs clear to auscultation; no crepitations, no wheeze  Gastrointestinal:  Soft, Non-tender, + BS	  Musculoskeletal: Normal range of motion. No edema  Skin: Warm and dry. No cyanosis . No diaphoresis.  Neurologic: Alert oriented to time place and person. Normal strength and no tremor.  Psychiatric: Normal mood and affect, Speech is normal and behavior is normal.        LABS:                        6.8    6.43  )-----------( 222      ( 14 Aug 2020 19:14 )             20.8     08-14    136  |  90<L>  |  23.0<H>  ----------------------------<  123<H>  3.5   |  31.0<H>  |  2.44<H>    Ca    9.5      14 Aug 2020 19:14    TPro  7.0  /  Alb  4.1  /  TBili  0.6  /  DBili  x   /  AST  21  /  ALT  20  /  AlkPhos  77  08-14    CARDIAC MARKERS ( 14 Aug 2020 19:14 )  x     / 0.15 ng/mL / x     / x     / x          PT/INR - ( 14 Aug 2020 19:14 )   PT: 12.7 sec;   INR: 1.10 ratio         PTT - ( 14 Aug 2020 19:14 )  PTT:35.0 sec    RADIOLOGY & ADDITIONAL STUDIES: (reviewed)  CXR was independently reviewed and demonstrated: mild congestion    CARDIOLOGY TESTING:(reviewed)     ECG (Independent visualization): NSR with first degree AV block and LVH with repolarization    ECHOCARDIOGRAM today showed concentric LVH preserved LV systolic function, moderate right ventricular hypertrophy, mild mitral stenosis, moderate MR, severe aortic stenosis with a calculated valve area of 0.8 cm² and a dimensionless index of 0.27 it was small bilateral pleural effusions inferior vena cava was dilated with depressed inspiratory collapse, right ventricular pressures were 60-65    MEDICATIONS:(reviewed)  Home Medications:  Home Medications:  Aranesp 100 mcg/0.5 mL injectable solution: 0.5 milliliter(s) injectable every 2 weeks    as recommended by nephrologist  (14 Aug 2020 21:53)  aspirin 81 mg oral delayed release tablet: 1 tab(s) orally once a day (14 Aug 2020 21:53)  atorvastatin 80 mg oral tablet: 1 tab(s) orally once a day (in the morning) (14 Aug 2020 21:53)  ferrous sulfate 325 mg (65 mg elemental iron) oral delayed release tablet: 1 tab(s) orally 3 times a day (14 Aug 2020 21:53)  hydrALAZINE 50 mg oral tablet: 1 tab(s) orally 2 times a day (14 Aug 2020 21:53)  isosorbide mononitrate 30 mg oral tablet, extended release: 1 tab(s) orally once a day (14 Aug 2020 21:53)  Nephro-Thomas oral tablet: 1 tab(s) orally once a day (14 Aug 2020 21:53)  NIFEdipine 60 mg oral tablet, extended release: 1 tab(s) orally once a day (at bedtime) (14 Aug 2020 21:53)  NIFEdipine 90 mg oral tablet, extended release: 1 tab(s) orally  (14 Aug 2020 21:53)  pantoprazole 40 mg oral delayed release tablet: 1 tab(s) orally 3 times a day, As Needed (14 Aug 2020 21:53)  torsemide 20 mg oral tablet: 1 tab(s) orally 2 times a day (14 Aug 2020 21:53)  Vitamin C 250 mg oral tablet: 1 tab(s) orally once a day (14 Aug 2020 21:53)  Vitamin D3 50,000 intl units oral capsule: 1 cap(s) orally once a week (14 Aug 2020 21:53)      MEDICATIONS  (STANDING):  furosemide   Injectable 40 milliGRAM(s) IV Push daily    MEDICATIONS  (PRN):
[facility] DIVISION OF KIDNEY DISEASES AND HYPERTENSION -- INITIAL CONSULT NOTE  --------------------------------------------------------------------------------  HPI: 85yo M hx ESRD on HD on M/F only, HFpEF, HTN, AS, prior GI bleed from ulcerated polyps and colonic AVMs, presenting with weakness and being admitted for acute on chronic anemia.  Daughter Vanessa Douglas who is RN manager at [employer] at bedside providing most of the hx.  Pt was recently started on HD in 6/2020 and receives it twice weekly due to pt preference.  He was unable to get full session of HD and his epogen injection on Monday this week due to ‘infiltration of his fistula’, but was able to get his full session of HD earlier today because his fistula became functional again.  Daughter was told ‘they removed extra fluid’ but she’s unsure of how much liters was taken off.  She was notified after HD that his blood counts were low with Hgb of 7.1 and she has noticed that ‘he looks weaker’ and hasn’t been as independent as he normally is recently.   She spoke to his nephrologist who advised that pt be taken to hospital given his recent his symptoms and worsening anemia. Pt also has been having chronic exertional dyspnea without significant relief with HD sessions that his cardiologist believes may be due to his worsening A and daughter would like inpatient work up of TAVR.   Per daughter pt was last seen in cardiologist office earlier this AM and had TTE that showed worsening AS.  Pt denies any active bleeding including melena, hematochezia, hematuria.  He also denies any fever, chills, chest pain, cough, abdominal pain.  Pt still produces urine and is on a diuretic on his non-HD days which day reports had just been increased today as per his cardiologist.  On admission, Hgb 6.8 gms.,     PAST HISTORY  --------------------------------------------------------------------------------  PAST MEDICAL & SURGICAL HISTORY:  GI bleeding: due to ulcerated polyps and colonic AVMs  Aortic stenosis  ESRD on dialysis: HD on Mondays and Fridays  Risk factors for obstructive sleep apnea  Anemia  VT (ventricular tachycardia)  HTN (hypertension)  CAD (coronary artery disease)  DM (diabetes mellitus)    H/O carotid endarterectomy: Right  A-V fistula: left arm 5/2017  H/O angioplasty: 2013,  no  intervention  H/O left knee surgery  H/O circumcision: at  age  65    FAMILY HISTORY:  FH: type 2 diabetes  Family history of premature CAD  Family history of lung cancer (Grandparent)  Family history of cancer (Grandparent)    PAST SOCIAL HISTORY:    ALLERGIES & MEDICATIONS  --------------------------------------------------------------------------------  Allergies    Plavix (Hives)  Toprol-XL (Rash)    Standing Inpatient Medications  aspirin enteric coated 81 milliGRAM(s) Oral daily  atorvastatin 80 milliGRAM(s) Oral at bedtime  hydrALAZINE 50 milliGRAM(s) Oral two times a day  isosorbide   mononitrate ER Tablet (IMDUR) 30 milliGRAM(s) Oral daily  multivitamin 1 Tablet(s) Oral daily  NIFEdipine XL 60 milliGRAM(s) Oral at bedtime  NIFEdipine XL 90 milliGRAM(s) Oral daily  torsemide 20 milliGRAM(s) Oral <User Schedule>    REVIEW OF SYSTEMS  --------------------------------------------------------------------------------  Gen: No weight changes, fatigue, fevers/chills, weakness  Skin: No rashes  Head/Eyes/Ears/Mouth: No headache; Normal hearing; Normal vision w/o blurriness; No sinus pain/discomfort, sore throat  Respiratory: No dyspnea, cough, wheezing, hemoptysis  CV: No chest pain, PND, orthopnea  GI: No abdominal pain, diarrhea, constipation, nausea, vomiting, melena, hematochezia  : No increased frequency, dysuria, hematuria, nocturia  MSK: No joint pain/swelling; no back pain; no edema  Neuro: No dizziness/lightheadedness, weakness, seizures, numbness, tingling  Heme: No easy bruising or bleeding  Endo: No heat/cold intolerance  Psych: No significant nervousness, anxiety, stress, depression    All other systems were reviewed and are negative, except as noted.    VITALS/PHYSICAL EXAM  --------------------------------------------------------------------------------  T(C): 36.8 (08-15-20 @ 05:11), Max: 37 (08-14-20 @ 18:15)  HR: 97 (08-15-20 @ 05:11) (86 - 97)  BP: 152/69 (08-15-20 @ 05:11) (119/52 - 156/61)  RR: 18 (08-15-20 @ 05:11) (18 - 20)  SpO2: 98% (08-15-20 @ 05:11) (98% - 98%)  Height (cm): 165.1 (08-14-20 @ 18:15)  Weight (kg): 68 (08-14-20 @ 18:15)  BMI (kg/m2): 24.9 (08-14-20 @ 18:15)  BSA (m2): 1.75 (08-14-20 @ 18:15)    Physical Exam:  	Gen: NAD, well-appearing, Pale,   	HEENT: PERRL, supple neck,   	Pulm: CTA B/L  	CV: RRR, S1S2; no rub  	Back: No spinal or CVA tenderness; no sacral edema  	Abd: +BS, soft, nontender/nondistended  	: No suprapubic tenderness  	UE: Warm, FROM, no clubbing, intact strength; no edema; no asterixis  	LE: Warm, FROM, no clubbing, intact strength; no edema  	Neuro: No focal deficits, intact gait  	Psych: Normal affect and mood  	Skin: Warm, without rashes  	Vascular access: AVF,     LABS/STUDIES  --------------------------------------------------------------------------------              8.2    7.55  >-----------<  204      [08-15-20 @ 04:32]              25.5     140  |  93  |  42.0  ----------------------------<  122      [08-15-20 @ 04:32]  4.1   |  30.0  |  3.43        Ca     9.8     [08-15-20 @ 04:32]    TPro  7.0  /  Alb  4.1  /  TBili  0.6  /  DBili  x   /  AST  21  /  ALT  20  /  AlkPhos  77  [08-14-20 @ 19:14]    PT/INR: PT 12.7 , INR 1.10       [08-14-20 @ 19:14]  PTT: 35.0       [08-14-20 @ 19:14]    Troponin 0.15      [08-15-20 @ 04:32]  CK 80      [08-15-20 @ 04:32]    Creatinine Trend:  SCr 3.43 [08-15 @ 04:32]  SCr 2.44 [08-14 @ 19:14]    Urinalysis - [06-08-20 @ 13:12]      Color Yellow / Appearance Slightly Turbid / SG 1.010 / pH 5.0      Gluc 250 / Ketone Negative  / Bili Negative / Urobili Negative       Blood Negative / Protein 100 / Leuk Est Negative / Nitrite Negative      RBC 0-2 / WBC 0-2 / Hyaline  / Gran  / Sq Epi  / Non Sq Epi Occasional / Bacteria Few    Iron 38, TIBC 289, %sat 13      [06-10-20 @ 06:29]  Ferritin 83      [06-10-20 @ 06:29]  HbA1c 4.9      [08-09-18 @ 05:54]    HBsAb <3.0      [06-25-20 @ 02:07]  HBsAg Nonreact      [06-25-20 @ 02:07]  HBcAb Nonreact      [06-25-20 @ 02:07]  HCV 0.11, Nonreact      [06-25-20 @ 02:07]
Consult for surgeon:  Dr. Lilly    Consult called for TAVR evaluation in the setting of worsening symptomatic aortic stenosis as per Cardiology. Patient had appointment with Dr. Lilly this week, but is now admitted to the hospital.     HPI:  86yoM hx ESRD on HD on M/F only, HFpEF, HTN, AS, prior GI bleed from ulcerated polyps and colonic AVMs, presenting with weakness and being admitted for acute on chronic anemia.  Daughter Vanessa Douglas who is RN manager at [employer] at bedside providing most of the hx.  Pt was recently started on HD in 2020 and receives it twice weekly due to pt preference.  He was unable to get full session of HD and his epogen injection on Monday this week due to ‘infiltration of his fistula’, but was able to get his full session of HD earlier today because his fistula became functional again.  Daughter was told ‘they removed extra fluid’ but she’s unsure of how much liters was taken off.  She was notified after HD that his blood counts were low with Hgb of 7.1 and she has noticed that ‘he looks weaker’ and hasn’t been as independent as he normally is recently.   She spoke to his nephrologist who advised that pt be taken to hospital given his recent his symptoms and worsening anemia. Pt also has been having chronic exertional dyspnea without significant relief with HD sessions that his cardiologist believes may be due to his worsening AS and daughter would like inpatient work up of TAVR.   Per daughter pt was last seen in cardiologist office earlier this AM and had TTE that showed worsening AS.  Pt denies any active bleeding including melena, hematochezia, hematuria.  He also denies any fever, chills, chest pain, cough, abdominal pain.  Pt still produces urine and is on a diuretic on his non-HD days which day reports had just been increased today as per his cardiologist.  On admission, Hgb 6.8. (14 Aug 2020 23:32)    Review of systems:  GENERAL:  Fevers[] chills[] sweats[] fatigue[X] weight loss[] weight gain []                                        NEURO:  parathesias[] seizures []  syncope []  confusion []                                                                                  EYES: blurry vision[]  discharge[] pain[]                                                                          ENMT:  difficulty hearing []  vertigo[]  dysphagia[] epistaxis[] recent dental work []                                      CV:  chest pain[] palpitations[] RICHARDS [X] diaphoresis [] edema[X]                                                                                             RESPIRATORY:  wheezing[] SOB[X] cough [] sputum[] hemoptysis[]                                                                    GI:  nausea[]  vomiting []  diarrhea[] melena []                                                                        : +Still produces urine on HD, hematuria[]  dysuria[] urgency[]                                                                                          MUSKULOSKELETAL:  joint swelling [] muscle weakness []                                                                  SKIN/BREAST:  rash[] itching []                                                               PSYCH:  dementia [] depression [] anxiety[]                                                                                       HEME:  bruises easily[]     Past Medical/Surgical History:  GI bleeding: due to ulcerated polyps and colonic AVMs  Aortic stenosis  ESRD on dialysis: HD on  and   Risk factors for obstructive sleep apnea  Anemia  RICHARDS (dyspnea on exertion)  VT (ventricular tachycardia)  HTN (hypertension)  CAD (coronary artery disease)  Arrhythmia  AV block, 1st degree  DM (diabetes mellitus)  H/O carotid endarterectomy: Right  A-V fistula: left arm 2017  H/O angioplasty: ,  no  intervention  H/O left knee surgery  H/O circumcision: at  age  65    Standing Medications:  aspirin enteric coated 81 milliGRAM(s) Oral daily  atorvastatin 80 milliGRAM(s) Oral at bedtime  epoetin juan-epbx (RETACRIT) Injectable 80545 Unit(s) SubCutaneous <User Schedule>  hydrALAZINE 50 milliGRAM(s) Oral two times a day  isosorbide   mononitrate ER Tablet (IMDUR) 30 milliGRAM(s) Oral daily  multivitamin 1 Tablet(s) Oral daily  NIFEdipine XL 60 milliGRAM(s) Oral at bedtime  NIFEdipine XL 90 milliGRAM(s) Oral daily  torsemide 20 milliGRAM(s) Oral <User Schedule>    Anti-coagulation: None    Allergies:  Plavix (Hives)  Toprol-XL (Rash)    Social History:  Denies any active or former smoking history. Denies any alcohol/drug use/abuse. Lives in a private home with his wife. Ambulates independently. Independent for ADLs. +3 stairs to get into home, otherwise one level with only a flight of stairs into the basement.     Family History  Mother-  age 70s, DM  Father-  age 70s, Lung CA    Vital Signs:  T(F): 98.1 (08-15-20 @ 21:12)  HR: 99 (08-15-20 @ 21:12)  BP: 150/70 (08-15-20 @ 21:12)  RR: 18 (08-15-20 @ 21:12)  SpO2: 97% (08-15-20 @ 21:12)    Physical Exam  General: Well nourished, well developed, no acute distress.                                                         Neuro: Normal exam oriented to person/place & time with no focal motor or sensory  deficits.                    Neck: no JVD noted  Chest: +Fine scattered rales throughout b/l lung fields                                                                     CV: RRR, S1S2, +DEANA  Carotids: No Bruits appreciated                                                                  GI: soft, NT, ND, normoactive bowel sounds                                                                                             Extremities: Scant/+1 LE edema b/l  Lower Extremity Pulses: + DP, popliteal, femoral pulses b/l lower extremities  Vascular access: L AVF    Labs:                        8.2    7.55  )-----------( 204      ( 15 Aug 2020 04:32 )             25.5     08-15    140  |  93<L>  |  42.0<H>  ----------------------------<  122<H>  4.1   |  30.0<H>  |  3.43<H>    Ca    9.8      15 Aug 2020 04:32    TPro  7.0  /  Alb  4.1  /  TBili  0.6  /  DBili  x   /  AST  21  /  ALT  20  /  AlkPhos  77  08-14    PT/INR - ( 14 Aug 2020 19:14 )   PT: 12.7 sec;   INR: 1.10 ratio       PTT - ( 14 Aug 2020 19:14 )  PTT:35.0 sec    CARDIAC MARKERS ( 15 Aug 2020 04:32 )  x     / 0.15 ng/mL / 80 U/L / x     / x      CARDIAC MARKERS ( 14 Aug 2020 19:14 )  x     / 0.15 ng/mL / x     / x     / x        I&O's Summary    15 Aug 2020 07:01  -  16 Aug 2020 00:26  --------------------------------------------------------  IN: 240 mL / OUT: 0 mL / NET: 240 mL      Imaging    TTE-  Ordered, to be performed.     CXR-  < from: Xray Chest 1 View- PORTABLE-Urgent (20 @ 19:25) >  Heart is magnified by technique. Loop recorder over the left chest again noted.  The mild central congestive findings are again seen but there is improvement from  in this regard.  IMPRESSION: As above.  < end of copied text >

## 2020-08-16 NOTE — PROGRESS NOTE ADULT - SUBJECTIVE AND OBJECTIVE BOX
TAVR evaluation in the setting of worsening symptomatic aortic stenosis as per Cardiology.     Patient had appointment with Dr. Lilly this week, but is now admitted to the hospital.     HPI:  86yoM hx ESRD on HD on / only, HFpEF, HTN, AS, prior GI bleed from ulcerated polyps and colonic AVMs, presenting with weakness and being admitted for acute on chronic anemia.  Daughter Vanessa Douglas who is RN manager at [employer] at bedside providing most of the hx.  Pt was recently started on HD in 2020 and receives it twice weekly due to pt preference.  He was unable to get full session of HD and his epogen injection on Monday this week due to ‘infiltration of his fistula’, but was able to get his full session of HD earlier today because his fistula became functional again.  Daughter was told ‘they removed extra fluid’ but she’s unsure of how much liters was taken off.  She was notified after HD that his blood counts were low with Hgb of 7.1 and she has noticed that ‘he looks weaker’ and hasn’t been as independent as he normally is recently.   She spoke to his nephrologist who advised that pt be taken to hospital given his recent his symptoms and worsening anemia. Pt also has been having chronic exertional dyspnea without significant relief with HD sessions that his cardiologist believes may be due to his worsening AS and daughter would like inpatient work up of TAVR.   Per daughter pt was last seen in cardiologist office earlier this AM and had TTE that showed worsening AS.  Pt denies any active bleeding including melena, hematochezia, hematuria.  He also denies any fever, chills, chest pain, cough, abdominal pain.  Pt still produces urine and is on a diuretic on his non-HD days which day reports had just been increased today as per his cardiologist.  On admission, Hgb 6.8. (14 Aug 2020 23:32)    Review of systems:  GENERAL:  Fevers[] chills[] sweats[] fatigue[X] weight loss[] weight gain []                                        NEURO:  parathesias[] seizures []  syncope []  confusion []                                                                                  EYES: blurry vision[]  discharge[] pain[]                                                                          ENMT:  difficulty hearing []  vertigo[]  dysphagia[] epistaxis[] recent dental work []                                      CV:  chest pain[] palpitations[] RICHARDS [X] diaphoresis [] edema[X]                                                                                             RESPIRATORY: No   wheezing[] SOB[X] cough [] sputum[] hemoptysis[]                                                                    GI:  No nausea[]  vomiting []  diarrhea[] melena []                                                                        : +Still produces urine on HD,  No hematuria[N]  dysuria[] urgency[]                                                                                          MUSCULO  SKELETAL: No  joint swelling [] muscle weakness []                                                                  SKIN:   itching []                                                               PSYCH:  [X] depression [X] anxiety                                                                                       HEME:  bruises easily[X]     Past Medical/Surgical History:  GI bleeding: due to ulcerated polyps and colonic AVMs  Aortic stenosis  ESRD on dialysis: HD on  and   Risk factors for obstructive sleep apnea  Anemia  VT (ventricular tachycardia)  HTN (hypertension)  CAD (coronary artery disease)  DM (diabetes mellitus)    H/O carotid endarterectomy: Right  A-V fistula: left arm 2017  H/O angioplasty: ,    H/O left knee surgery  H/O circumcision:     Standing Medications:  aspirin enteric coated 81 milliGRAM(s) Oral daily  atorvastatin 80 milliGRAM(s) Oral at bedtime  epoetin juan-epbx (RETACRIT) Injectable 53475 Unit(s) SubCutaneous <User Schedule>  hydrALAZINE 50 milliGRAM(s) Oral two times a day  isosorbide   mononitrate ER Tablet (IMDUR) 30 milliGRAM(s) Oral daily  multivitamin 1 Tablet(s) Oral daily  NIFEdipine XL 60 milliGRAM(s) Oral at bedtime  NIFEdipine XL 90 milliGRAM(s) Oral daily  torsemide 20 milliGRAM(s) Oral <User Schedule>    Anti-coagulation: None    Allergies:  Plavix (Hives)  Toprol-XL (Rash)    Social History:  Denies any active or former smoking history. Denies any alcohol/drug use/abuse. Lives in a private home with his wife. Ambulates independently. Independent for ADLs. +3 stairs to get into home, otherwise one level with only a flight of stairs into the basement.     Family History  Mother-  age 70s, DM  Father-  age 70s, Lung CA    Vital Signs:  T(F): 98.1 (08-15-20 @ 21:12)  HR: 99 (08-15-20 @ 21:12)  BP: 150/70 (08-15-20 @ 21:12)  RR: 18 (08-15-20 @ 21:12)  SpO2: 97% (08-15-20 @ 21:12)    Physical Exam  General: Well nourished, well developed, no acute distress.                                                         Neuro: Normal exam oriented to person/place & time with no focal motor or sensory  deficits.                    Neck: no JVD noted  Chest: +Fine scattered rales throughout b/l lung fields                                                                     CV: RRR, S1S2, +DEANA  Carotids: No Bruits appreciated                                                                  GI: soft, NT, ND, normoactive bowel sounds                                                                                             Extremities: Scant/+1 LE edema b/l  Lower Extremity Pulses: + DP, popliteal, femoral pulses b/l lower extremities  Vascular access: L AVF +,     Labs:                        8.2    7.55  )-----------( 204      ( 15 Aug 2020 04:32 )             25.5     08-15    140  |  93<L>  |  42.0<H>  ----------------------------<  122<H>  4.1   |  30.0<H>  |  3.43<H>    Ca    9.8      15 Aug 2020 04:32    TPro  7.0  /  Alb  4.1  /  TBili  0.6  /  DBili  x   /  AST  21  /  ALT  20  /  AlkPhos  77  08-14    PT/INR - ( 14 Aug 2020 19:14 )   PT: 12.7 sec;   INR: 1.10 ratio       PTT - ( 14 Aug 2020 19:14 )  PTT:35.0 sec    CARDIAC MARKERS ( 15 Aug 2020 04:32 )        / 0.15 ng/mL / 80 U/L /   CARDIAC MARKERS ( 14 Aug 2020 19:14 )    / 0.15 ng/mL /      I&O's Summary    15 Aug 2020 07:01  -  16 Aug 2020 00:26  --------------------------------------------------------  IN: 240 mL / OUT: 0 mL / NET: 240 mL      Imaging    X- ray Chest 1 View- PORTABLE-Urgent (20 @ 19:25)    Heart is magnified by technique. Loop recorder over the left chest again noted.  The mild central congestive findings are again seen but there is improvement from  in this regard.    IMPRESSION: As above.

## 2020-08-16 NOTE — PROGRESS NOTE ADULT - SUBJECTIVE AND OBJECTIVE BOX
Patient is a 86y old  Male who presents with a chief complaint of acute on chronic anemia, worsening AS (16 Aug 2020 10:55)  Patient seen and examined at bedside.     ALLERGIES:  Plavix (Hives)  Toprol-XL (Rash)    MEDICATIONS  (STANDING):  aspirin enteric coated 81 milliGRAM(s) Oral daily  atorvastatin 80 milliGRAM(s) Oral at bedtime  epoetin juan-epbx (RETACRIT) Injectable 49215 Unit(s) SubCutaneous <User Schedule>  furosemide   Injectable 40 milliGRAM(s) IV Push once  hydrALAZINE 50 milliGRAM(s) Oral two times a day  isosorbide   mononitrate ER Tablet (IMDUR) 30 milliGRAM(s) Oral daily  multivitamin 1 Tablet(s) Oral daily  NIFEdipine XL 60 milliGRAM(s) Oral at bedtime  NIFEdipine XL 90 milliGRAM(s) Oral daily  sertraline 25 milliGRAM(s) Oral at bedtime  torsemide 20 milliGRAM(s) Oral <User Schedule>    MEDICATIONS  (PRN):    Vital Signs Last 24 Hrs  T(F): 98.3 (16 Aug 2020 09:25), Max: 98.3 (15 Aug 2020 21:10)  HR: 90 (16 Aug 2020 09:25) (80 - 99)  BP: 122/70 (16 Aug 2020 09:25) (122/70 - 155/65)  RR: 18 (16 Aug 2020 09:25) (16 - 18)  SpO2: 98% (16 Aug 2020 09:25) (95% - 98%)  I&O's Summary    15 Aug 2020 07:01  -  16 Aug 2020 07:00  --------------------------------------------------------  IN: 240 mL / OUT: 0 mL / NET: 240 mL    PHYSICAL EXAM:  General: NAD, alert  ENT: MMM, no thrush  Neck: Supple, No JVD  Lungs: +crackles  Cardio:+s1/s2 =trace edema b/l le  Abdomen: Soft, Nontender, Nondistended; Bowel sounds present  Extremities: No calf tenderness      LABS:                        7.5    7.90  )-----------( 205      ( 16 Aug 2020 10:44 )             22.4     08-16    139  |  94  |  73.0  ----------------------------<  166  3.5   |  28.0  |  5.00    Ca    9.6      16 Aug 2020 10:44    TPro  7.0  /  Alb  4.1  /  TBili  0.6  /  DBili  x   /  AST  21  /  ALT  20  /  AlkPhos  77  08-14    eGFR if Non African American: 10 mL/min/1.73M2 (08-16-20 @ 10:44)  eGFR if : 11 mL/min/1.73M2 (08-16-20 @ 10:44)    PT/INR - ( 14 Aug 2020 19:14 )   PT: 12.7 sec;   INR: 1.10 ratio    PTT - ( 14 Aug 2020 19:14 )  PTT:35.0 sec    CARDIAC MARKERS ( 15 Aug 2020 04:32 )  x     / 0.15 ng/mL / 80 U/L / x     / x      CARDIAC MARKERS ( 14 Aug 2020 19:14 )  x     / 0.15 ng/mL / x     / x     / x        RADIOLOGY & ADDITIONAL TESTS:  < from: Xray Chest 1 View- PORTABLE-Urgent (08.14.20 @ 19:25) >  INTERPRETATION:  AP chest on August 14, 2020 at 7:14 PM. Patient has weakness patient has renal insufficiency, hypertension, and diabetes. There is history of CHF and anemia of chronic renal disease. Patient has blood loss anemia and is being considered for transfusion. Patient is on dialysis.  Heart is magnified by technique. Loop recorder over the left chest again noted.  The mild central congestive findings are again seen but there is improvement from June 23 in this regard.  < end of copied text >    Care Discussed with Consultants/Other Providers:   Cardiology Patient is a 86y old  Male who presents with a chief complaint of acute on chronic anemia, worsening AS (16 Aug 2020 10:55)  Patient seen and examined at bedside.     ALLERGIES:  Plavix (Hives)  Toprol-XL (Rash)    MEDICATIONS  (STANDING):  aspirin enteric coated 81 milliGRAM(s) Oral daily  atorvastatin 80 milliGRAM(s) Oral at bedtime  epoetin juan-epbx (RETACRIT) Injectable 76904 Unit(s) SubCutaneous <User Schedule>  furosemide   Injectable 40 milliGRAM(s) IV Push once  hydrALAZINE 50 milliGRAM(s) Oral two times a day  isosorbide   mononitrate ER Tablet (IMDUR) 30 milliGRAM(s) Oral daily  multivitamin 1 Tablet(s) Oral daily  NIFEdipine XL 60 milliGRAM(s) Oral at bedtime  NIFEdipine XL 90 milliGRAM(s) Oral daily  sertraline 25 milliGRAM(s) Oral at bedtime  torsemide 20 milliGRAM(s) Oral <User Schedule>    MEDICATIONS  (PRN):    Vital Signs Last 24 Hrs  T(F): 98.3 (16 Aug 2020 09:25), Max: 98.3 (15 Aug 2020 21:10)  HR: 90 (16 Aug 2020 09:25) (80 - 99)  BP: 122/70 (16 Aug 2020 09:25) (122/70 - 155/65)  RR: 18 (16 Aug 2020 09:25) (16 - 18)  SpO2: 98% (16 Aug 2020 09:25) (95% - 98%)  I&O's Summary    15 Aug 2020 07:01  -  16 Aug 2020 07:00  --------------------------------------------------------  IN: 240 mL / OUT: 0 mL / NET: 240 mL    PHYSICAL EXAM:  General: NAD, alert  ENT: MMM, no thrush  Neck: Supple, No JVD  Lungs: +crackles  Cardio:+s1/s2 trace edema b/l le  Abdomen: Soft, Nontender, Nondistended; Bowel sounds present  Extremities: No calf tenderness      LABS:                        7.5    7.90  )-----------( 205      ( 16 Aug 2020 10:44 )             22.4     08-16    139  |  94  |  73.0  ----------------------------<  166  3.5   |  28.0  |  5.00    Ca    9.6      16 Aug 2020 10:44    TPro  7.0  /  Alb  4.1  /  TBili  0.6  /  DBili  x   /  AST  21  /  ALT  20  /  AlkPhos  77  08-14    eGFR if Non African American: 10 mL/min/1.73M2 (08-16-20 @ 10:44)  eGFR if : 11 mL/min/1.73M2 (08-16-20 @ 10:44)    PT/INR - ( 14 Aug 2020 19:14 )   PT: 12.7 sec;   INR: 1.10 ratio    PTT - ( 14 Aug 2020 19:14 )  PTT:35.0 sec    CARDIAC MARKERS ( 15 Aug 2020 04:32 )  x     / 0.15 ng/mL / 80 U/L / x     / x      CARDIAC MARKERS ( 14 Aug 2020 19:14 )  x     / 0.15 ng/mL / x     / x     / x        RADIOLOGY & ADDITIONAL TESTS:  < from: Xray Chest 1 View- PORTABLE-Urgent (08.14.20 @ 19:25) >  INTERPRETATION:  AP chest on August 14, 2020 at 7:14 PM. Patient has weakness patient has renal insufficiency, hypertension, and diabetes. There is history of CHF and anemia of chronic renal disease. Patient has blood loss anemia and is being considered for transfusion. Patient is on dialysis.  Heart is magnified by technique. Loop recorder over the left chest again noted.  The mild central congestive findings are again seen but there is improvement from June 23 in this regard.  < end of copied text >    Care Discussed with Consultants/Other Providers:   Cardiology

## 2020-08-16 NOTE — CHART NOTE - NSCHARTNOTEFT_GEN_A_CORE
TAVR work up follow up-     - TAVR CTA was ordered for 8/17 AM. Discussed with Dr. Goldstein to dialyze patient after CTA is completed.  - TTE to be completed today, spoke to ECHO lab to confirm.  - PT order for frailty testing in place.  - Patient has no recent heart cath in EMR. Need to communicate with [facility] cardiology. Patient may require recent cath prior to procedure.  - Ultimately, TAVR will likely be done as an outpatient once patient is medically optimized. Will discuss further with Dr. Lilly.

## 2020-08-16 NOTE — CONSULT NOTE ADULT - ASSESSMENT
86y Male with prior history of anomalous right coronary, nonobstructive CAD, hypertension, hyperlipidemia, history of ventricular arrhythmias, history of diastolic CHF, Aortic stenosis, carotid disease, renal failure stage V on HD 2 times a week with persistent volume overload  with shortness of breath, RICHARDS with anemia and worsening AS    AS  - TAVR work up given symptomatic AS  - CTS evaluation with Dr. Lilly for TAVR w/u  - Telemetry monitoring    HTN  - BP controlled on current meds    Dyslipidemia  - Continue statin    Shortness of breath  - Likely multifactorial including anemia, AS, ESRD  - Agree with iv lasix  - PRBC transfusion    ESRD on HD  - FU renal      Plan discussed with Medicine team, family at bedside
ESRD - HD    Aortic  Valve Stenosis    -S/P PRBC Transfusion,    -HD On Monday ( [facility] )    -TAVR W.U,
86 year old male patient with medical history of Male with prior history of anomalous right coronary, nonobstructive CAD, hypertension, hyperlipidemia, history of ventricular arrhythmias, history of diastolic CHF, Aortic stenosis, carotid disease, ESRD on HD 2 times a week (Monday/friday) with persistent volume overload, prior GI bleed from ulcerated polyps and colonic AVMs, presented with SOB, RICHARDS, acute on chronic anemia in the setting of worsening symptomatic aortic stenosis. CT Surgery consult called for TAVR workup.

## 2020-08-16 NOTE — PROGRESS NOTE ADULT - ASSESSMENT
86 year old male patient with medical history of Male with prior history of anomalous right coronary, nonobstructive CAD, hypertension, hyperlipidemia, history of ventricular arrhythmias, history of diastolic CHF, Aortic stenosis, carotid disease, ESRD on HD 2 times a week (Monday/ Friday ) with persistent volume overload, prior GI bleed from ulcerated polyps and colonic AVMs, presented with SOB, RICHARDS, acute on chronic anemia in the setting of worsening symptomatic aortic stenosis. CT Surgery consult called for TAVR workup.    Problem: Aortic stenosis.     Recommendation: Continuous telemetry.     TAVR CTA in AM , HD Post IV Contrast    TTE Planned today,       ·  Problem: ESRD on  Hemo dialysis.      L AVF for vascular access    Patient is Non Oliguric, On  diuretics  on non-HD days.    Cleared for discharge in AM After HD,    D/W Mariam ( CTS )

## 2020-08-16 NOTE — CONSULT NOTE ADULT - PROBLEM SELECTOR RECOMMENDATION 9
Continuous telemetry.   TTE ordered and pending.   TAVR CTA to be performed during this hospitalization.  Preop workup in progress to evaluate if patient is a candidate for a TAVR.   Will continue to follow along.

## 2020-08-16 NOTE — CONSULT NOTE ADULT - PROBLEM SELECTOR RECOMMENDATION 2
Nephrology following.   L AVF for vascular access  HD scheduled for Monday.   Patient still produces urine and takes a diuretic on non-HD days.    Further plan as per primary team.   Plan of care discussed with CT Surgery attending.

## 2020-08-16 NOTE — PROGRESS NOTE ADULT - ASSESSMENT
86yoM hx ESRD on HD on M/F only, HFpEF, HTN, AS, prior GI bleed from ulcerated polyps and colonic AVMs presenting with worsening weakness fatigue after missing HD session with epogen injection once this week found to have worsening anemia on outpatient labs, also with worsening AS     #Acute on chronic anemia in setting of ESRD  - No recent or active bleeding likely related to renal insufficiency and missed epogen injection  - baseline Hgb appears to be 8-10  - s/p prbc x 2 will transfuse another w/ lasix after transfusion  - monitor h and h   - Nephrology consult appreciated     #ESRD on HD   - nephrology consult appreciated   - HD per nephro    #Elevated troponin   - Chronically elevated sine 6/2020, likely due to ESRD    #AS  - Pt with chronic dyspnea, deemed secondary to worsening AS  - CT surgery consult appreciated  - cardio consult appreciated     #HFpEF with mild acute decompensation   - CXR with mild central congestion, but improvement from 6/23 study  - PO torsemide on non-HD days   - Cardiology consult appreciated    #CAD  - ASA, statin     #HTN- Essential  - Hydralazine, nifedipine, imdur resumed  - monitor blood pressure     #DVT prophylaxis  - venodynes  - no chemical a/c given hx of gi bleed from polyps, avms, recent fobt+    patient hospital course to date d/w patient daughter Vanessa all questions answered. Possible ct angio tavr protocol and cath in AM

## 2020-08-17 ENCOUNTER — TRANSCRIPTION ENCOUNTER (OUTPATIENT)
Age: 85
End: 2020-08-17

## 2020-08-17 ENCOUNTER — APPOINTMENT (OUTPATIENT)
Dept: ELECTROPHYSIOLOGY | Facility: CLINIC | Age: 85
End: 2020-08-17
Payer: MEDICARE

## 2020-08-17 LAB
ANION GAP SERPL CALC-SCNC: 19 MMOL/L — HIGH (ref 5–17)
BUN SERPL-MCNC: 84 MG/DL — HIGH (ref 8–20)
CALCIUM SERPL-MCNC: 9.5 MG/DL — SIGNIFICANT CHANGE UP (ref 8.6–10.2)
CHLORIDE SERPL-SCNC: 98 MMOL/L — SIGNIFICANT CHANGE UP (ref 98–107)
CO2 SERPL-SCNC: 25 MMOL/L — SIGNIFICANT CHANGE UP (ref 22–29)
CREAT SERPL-MCNC: 5.23 MG/DL — HIGH (ref 0.5–1.3)
GLUCOSE SERPL-MCNC: 122 MG/DL — HIGH (ref 70–99)
HCT VFR BLD CALC: 24 % — LOW (ref 39–50)
HCT VFR BLD CALC: 24.8 % — LOW (ref 39–50)
HGB BLD-MCNC: 8.2 G/DL — LOW (ref 13–17)
HGB BLD-MCNC: 8.3 G/DL — LOW (ref 13–17)
MCHC RBC-ENTMCNC: 30.2 PG — SIGNIFICANT CHANGE UP (ref 27–34)
MCHC RBC-ENTMCNC: 30.6 PG — SIGNIFICANT CHANGE UP (ref 27–34)
MCHC RBC-ENTMCNC: 33.5 GM/DL — SIGNIFICANT CHANGE UP (ref 32–36)
MCHC RBC-ENTMCNC: 34.2 GM/DL — SIGNIFICANT CHANGE UP (ref 32–36)
MCV RBC AUTO: 89.6 FL — SIGNIFICANT CHANGE UP (ref 80–100)
MCV RBC AUTO: 90.2 FL — SIGNIFICANT CHANGE UP (ref 80–100)
PLATELET # BLD AUTO: 199 K/UL — SIGNIFICANT CHANGE UP (ref 150–400)
PLATELET # BLD AUTO: 210 K/UL — SIGNIFICANT CHANGE UP (ref 150–400)
POTASSIUM SERPL-MCNC: 3.7 MMOL/L — SIGNIFICANT CHANGE UP (ref 3.5–5.3)
POTASSIUM SERPL-SCNC: 3.7 MMOL/L — SIGNIFICANT CHANGE UP (ref 3.5–5.3)
RBC # BLD: 2.68 M/UL — LOW (ref 4.2–5.8)
RBC # BLD: 2.75 M/UL — LOW (ref 4.2–5.8)
RBC # FLD: 17.2 % — HIGH (ref 10.3–14.5)
RBC # FLD: 17.2 % — HIGH (ref 10.3–14.5)
SODIUM SERPL-SCNC: 142 MMOL/L — SIGNIFICANT CHANGE UP (ref 135–145)
WBC # BLD: 7.47 K/UL — SIGNIFICANT CHANGE UP (ref 3.8–10.5)
WBC # BLD: 7.62 K/UL — SIGNIFICANT CHANGE UP (ref 3.8–10.5)
WBC # FLD AUTO: 7.47 K/UL — SIGNIFICANT CHANGE UP (ref 3.8–10.5)
WBC # FLD AUTO: 7.62 K/UL — SIGNIFICANT CHANGE UP (ref 3.8–10.5)

## 2020-08-17 PROCEDURE — 71275 CT ANGIOGRAPHY CHEST: CPT | Mod: 26

## 2020-08-17 PROCEDURE — 99233 SBSQ HOSP IP/OBS HIGH 50: CPT

## 2020-08-17 PROCEDURE — 93298 REM INTERROG DEV EVAL SCRMS: CPT

## 2020-08-17 PROCEDURE — G2066: CPT

## 2020-08-17 PROCEDURE — 93458 L HRT ARTERY/VENTRICLE ANGIO: CPT | Mod: 26

## 2020-08-17 PROCEDURE — 99152 MOD SED SAME PHYS/QHP 5/>YRS: CPT

## 2020-08-17 PROCEDURE — 74174 CTA ABD&PLVS W/CONTRAST: CPT | Mod: 26

## 2020-08-17 PROCEDURE — 90937 HEMODIALYSIS REPEATED EVAL: CPT

## 2020-08-17 RX ORDER — SODIUM CHLORIDE 9 MG/ML
1000 INJECTION, SOLUTION INTRAVENOUS
Refills: 0 | Status: DISCONTINUED | OUTPATIENT
Start: 2020-08-17 | End: 2020-08-20

## 2020-08-17 RX ADMIN — ATORVASTATIN CALCIUM 80 MILLIGRAM(S): 80 TABLET, FILM COATED ORAL at 21:13

## 2020-08-17 RX ADMIN — SERTRALINE 25 MILLIGRAM(S): 25 TABLET, FILM COATED ORAL at 21:13

## 2020-08-17 RX ADMIN — Medication 90 MILLIGRAM(S): at 05:08

## 2020-08-17 RX ADMIN — Medication 50 MILLIGRAM(S): at 05:08

## 2020-08-17 RX ADMIN — Medication 81 MILLIGRAM(S): at 07:39

## 2020-08-17 RX ADMIN — ISOSORBIDE MONONITRATE 30 MILLIGRAM(S): 60 TABLET, EXTENDED RELEASE ORAL at 07:39

## 2020-08-17 RX ADMIN — Medication 50 MILLIGRAM(S): at 18:45

## 2020-08-17 RX ADMIN — Medication 60 MILLIGRAM(S): at 21:14

## 2020-08-17 RX ADMIN — Medication 1 TABLET(S): at 07:39

## 2020-08-17 NOTE — PROGRESS NOTE ADULT - ASSESSMENT
86yoM hx ESRD on HD on M/F only, HFpEF, HTN, AS, prior GI bleed from ulcerated polyps and colonic AVMs presenting with worsening weakness fatigue after missing HD session with epogen injection once this week found to have worsening anemia on outpatient labs, also with worsening AS     #Acute on chronic anemia in setting of ESRD  - No recent or active bleeding likely related to renal insufficiency and missed epogen injection  - baseline Hgb appears to be 8-10  - s/p prbc x 2 will transfuse another w/ lasix after transfusion  - monitor h and h   - Nephrology consult appreciated     #ESRD on HD   - nephrology consult appreciated   - HD per nephro    #Elevated troponin   - Chronically elevated sine 6/2020, likely due to ESRD    #AS  - Pt with chronic dyspnea, deemed secondary to worsening AS  - CT surgery consult appreciated  - cardio consult appreciated     #HFpEF with mild acute decompensation   - CXR with mild central congestion, but improvement from 6/23 study  - PO torsemide on non-HD days   - Cardiology consult appreciated    #CAD  - ASA, statin     #HTN- Essential  - Hydralazine, nifedipine, imdur resumed  - monitor blood pressure     #DVT prophylaxis  - venodynes  - no chemical a/c given hx of gi bleed from polyps, avms, recent fobt+    patient hospital course to date d/w patient daughter Vanessa all questions answered. Possible ct angio tavr protocol and cath in AM 86yoM hx ESRD on HD on M/F only, HFpEF, HTN, AS, prior GI bleed from ulcerated polyps and colonic AVMs presenting with worsening weakness fatigue after missing HD session with epogen injection once this week found to have worsening anemia on outpatient labs, also with worsening AS     #Acute on chronic anemia in setting of ESRD  - No recent or active bleeding likely related to renal insufficiency and missed epogen injection  - baseline Hgb appears to be 8-10  - s/p prbc x 2 will transfuse another w/ lasix after transfusion  - monitor h and h   - Nephrology consult appreciated     #ESRD on HD   - nephrology consult appreciated   - HD per nephro    #Elevated troponin   - Chronically elevated sine 6/2020, likely due to ESRD    #AS  - Pt with chronic dyspnea, deemed secondary to worsening AS  - CT surgery consult appreciated  - cardio consult appreciated     #HFpEF with mild acute decompensation   - CXR with mild central congestion, but improvement from 6/23 study  - PO torsemide on non-HD days   - Cardiology consult appreciated    #CAD  - ASA, statin     #HTN- Essential  - Hydralazine, nifedipine, imdur resumed  - monitor blood pressure     #DVT prophylaxis  - venodynes  - no chemical a/c given hx of gi bleed from polyps, avms, recent fobt+    patient hospital course to date d/w patient daughter Vanessa all questions answered. Possible ct angio tavr protocol and cath today 86yoM hx ESRD on HD on M/F only, HFpEF, HTN, AS, prior GI bleed from ulcerated polyps and colonic AVMs presenting with worsening weakness fatigue after missing HD session with epogen injection once this week found to have worsening anemia on outpatient labs, also with worsening AS     #Acute on chronic anemia in setting of ESRD  - No recent or active bleeding likely related to renal insufficiency and missed epogen injection  - baseline Hgb appears to be 8-10  - s/p prbc x 2   - monitor h and h   - Nephrology consult appreciated     #ESRD on HD   - nephrology consult appreciated   - HD per nephro    #Elevated troponin   - Chronically elevated sine 6/2020, likely due to ESRD    #AS  - Pt with chronic dyspnea, deemed secondary to worsening AS  - CT surgery consult appreciated  - cardio consult appreciated     #HFpEF with mild acute decompensation   - CXR with mild central congestion, but improvement from 6/23 study  - PO torsemide on non-HD days   - Cardiology consult appreciated    #CAD  - ASA, statin     #HTN- Essential  - Hydralazine, nifedipine, imdur resumed  - monitor blood pressure     #DVT prophylaxis  - venodynes  - no chemical a/c given hx of gi bleed from polyps, avms, recent fobt+    patient hospital course to date d/w patient daughter Vanessa all questions answered. Possible ct angio tavr protocol and cath today

## 2020-08-17 NOTE — PROGRESS NOTE ADULT - SUBJECTIVE AND OBJECTIVE BOX
Cardiology NP post procedure note:    -s/p LHC: 50% mLAD; 70% codominant pRCA; severe AS    TELE: NSR 1st degree AV block    MEDICATIONS  (STANDING):  aspirin enteric coated 81 milliGRAM(s) Oral daily  atorvastatin 80 milliGRAM(s) Oral at bedtime  dextrose 5%. 1000 milliLiter(s) (30 mL/Hr) IV Continuous <Continuous>  epoetin juan-epbx (RETACRIT) Injectable 72449 Unit(s) SubCutaneous <User Schedule>  hydrALAZINE 50 milliGRAM(s) Oral two times a day  isosorbide   mononitrate ER Tablet (IMDUR) 30 milliGRAM(s) Oral daily  multivitamin 1 Tablet(s) Oral daily  NIFEdipine XL 60 milliGRAM(s) Oral at bedtime  NIFEdipine XL 90 milliGRAM(s) Oral daily  sertraline 25 milliGRAM(s) Oral at bedtime  torsemide 20 milliGRAM(s) Oral <User Schedule>      Allergies:  Plavix (Hives)  Toprol-XL (Rash)    PAST MEDICAL & SURGICAL HISTORY:  GI bleeding: due to ulcerated polyps and colonic AVMs  Aortic stenosis  ESRD on dialysis: HD on Mondays and Fridays  Risk factors for obstructive sleep apnea  Anemia  RICHARDS (dyspnea on exertion)  VT (ventricular tachycardia)  HTN (hypertension)  CAD (coronary artery disease)  Arrhythmia  AV block, 1st degree  DM (diabetes mellitus)  H/O carotid endarterectomy: Right  A-V fistula: left arm 5/2017  H/O angioplasty: 2013,  no  intervention  H/O left knee surgery  H/O circumcision: at  age  65      Vital Signs Last 24 Hrs  T(C): 36.4 (17 Aug 2020 11:39), Max: 37.1 (16 Aug 2020 14:37)  T(F): 97.6 (17 Aug 2020 11:39), Max: 98.7 (16 Aug 2020 14:37)  HR: 96 (17 Aug 2020 14:00) (74 - 104)  BP: 110/53 (17 Aug 2020 14:00) (90/42 - 159/67)  BP(mean): --  RR: 16 (17 Aug 2020 14:00) (16 - 18)  SpO2: 96% (17 Aug 2020 14:00) (93% - 100%)    Physical Exam:  Constitutional: NAD, AAOx3  Cardiovascular: +S1S2 RRR  Pulmonary: CTA b/l, unlabored  GI: soft NTND +BS  Extremities: no pedal edema, +distal pulses b/l LE pulses via doppler  Neuro: non focal, MARQUEZ x4  Procedure site: Right radial band in place; site benign without hematoma/bleeding; RUE warm/mobile/acyanotic; + right ulnar pulse    LABS:                        8.3    7.62  )-----------( 210      ( 17 Aug 2020 06:07 )             24.8     08-17    142  |  98  |  84.0<H>  ----------------------------<  122<H>  3.7   |  25.0  |  5.23<H>    Ca    9.5      17 Aug 2020 06:07        RADIOLOGY & ADDITIONAL TESTS:  < from: TTE Echo Complete w/o Contrast w/ Doppler (08.16.20 @ 12:39) >  PHYSICIAN INTERPRETATION:  Left Ventricle: The left ventricular internal cavity size is normal.  Global LV systolic function was normal. Left ventricular ejection fraction, by visual estimation, is 60 to 65%. Spectral Doppler shows restrictive pattern of left ventricular myocardial filling (Grade III diastolic dysfunction). Elevated mean left atrial pressure.  Right Ventricle: Normal right ventricular size and function.  Left Atrium: Severely enlarged left atrium.  Right Atrium: Normal right atrial size.  Pericardium: There is no evidence of pericardial effusion.  Mitral Valve: There is moderate mitral annular calcification. Peak transmitral mean gradient equals 5.0 mmHg, calculated mitral valve area by pressure half time equals 2.56 cm² consistent with No evidence of mitral stenosis. Mild mitral valve regurgitation is seen.  Tricuspid Valve: The tricuspid valve is normal in structure. Trivial tricuspid regurgitation is visualized. Adequate TR velocity was not obtained to accurately assess RVSP.  Aortic Valve: The aortic valve is trileaflet. Moderate to severe aortic stenosis is present. Peak transaortic gradient equals 54.8 mmHg, mean transaortic gradient equals 35.0 mmHg, the calculated aortic valve area equals 0.83 cm² by the continuity equation consistent with severe aortic stenosis. The peak aortic velocity was obtained from the apical view. Trivial aortic valve regurgitation is seen. The Dimesionless Index value is .26.  Pulmonic Valve: The pulmonic valve was not well visualized. Trace pulmonic valve regurgitation.  Aorta: Dilated Ao root at 3.9cm.  Pulmonary Artery: The pulmonary artery is of normal size and origin.  Venous: The pulmonary veins appear normal. The inferior vena cava was dilated, with respiratory size variation greater than 50%.      Summary:   1. Severely enlarged left atrium.   2. There is moderate concentric left ventricular hypertrophy.   3. Left ventricular ejection fraction, by visual estimation, is 60 to 65%. Grade III diastolic dysfunction.   4. Elevated mean left atrial pressure. (LAP = 41 mm Hg)   5. Normal right atrial size.   6. Normal right ventricular size and function.   7. Mild mitral valve regurgitation.   8. Moderate to severe aortic valve stenosis.   9. There is no evidence of pericardial effusion.    MD Rohith, RPVI Electronically signed on 8/16/2020 at 3:29:26 PM

## 2020-08-17 NOTE — PROGRESS NOTE ADULT - ASSESSMENT
TTE Echo Complete w/o Contrast w/ Doppler (08.16.20 @ 12:39)     Summary:     1. Severely enlarged left atrium.   2. There is moderate concentric left ventricular hypertrophy.   3. Left ventricular ejection fraction, by visual estimation, is 60 to 65%. Grade III diastolic dysfunction.   4. Elevated mean left atrial pressure. (LAP = 41 mm Hg)   5. Normal right atrial size.   6. Normal right ventricular size and function.   7. Mild mitral valve regurgitation.   8. Moderate to severe aortic valve stenosis.   9. There is no evidence of pericardial effusion.    Cici Krishnamurthy MD, Electronically signed on 8/16/2020 at 3:29:26 PM    DX : Severe AS normal EF,  prior nonobstructive  CAD  ESRD on HD  PVD    Rec    R and L cath today , TAVR  CTA,    HD after the above today & In AM,     D/W Dr. Davenport,

## 2020-08-17 NOTE — PROGRESS NOTE ADULT - ASSESSMENT
A/P: 86y Male with prior history of anomalous right coronary, nonobstructive CAD, hypertension, hyperlipidemia, history of ventricular arrhythmias, history of diastolic CHF, Aortic stenosis, carotid disease, renal failure stage V on HD 2 times a week with persistent volume overload with shortness of breath, RICHARDS with anemia and worsening AS.  He presents today for right and left heart cath with Dr. Henry in preparation for TAVR in the near future with Dr. Lilly.  Procedure considerations: Patient s/p recent (6/9/2020) Right SFA POBA accessed via Left Femoral artery and hemostasis obtained with a Mynx.  Secondary to the procedure considerations and high BRA (12.7%) only a LHC was performed via RRA.  Now s/p LHC: 50% mLAD; 70% codominant pRCA; severe AS.  -Remove radial band one hour post procedure   -Bedrest x 1 hour post procedure then OOB as tolerated  -Med management for CAD  -Plan is for TAVR as outpatient with Dr. Lilly  -For HD this afternoon  -Additional plan as per Attending MD  -Discussed with Dr. Henry

## 2020-08-17 NOTE — DISCHARGE NOTE PROVIDER - NSDCMRMEDTOKEN_GEN_ALL_CORE_FT
aspirin 81 mg oral delayed release tablet: 1 tab(s) orally once a day  atorvastatin 80 mg oral tablet: 1 tab(s) orally once a day (in the morning)  hydrALAZINE 50 mg oral tablet: 1 tab(s) orally 2 times a day  isosorbide mononitrate 30 mg oral tablet, extended release: 1 tab(s) orally once a day  Nephro-Thomas oral tablet: 1 tab(s) orally once a day  NIFEdipine 60 mg oral tablet, extended release: 1 tab(s) orally once a day (at bedtime)  NIFEdipine 90 mg oral tablet, extended release: 1 tab(s) orally   torsemide 20 mg oral tablet: 1 tab(s) orally 2 times a day on non HD-days

## 2020-08-17 NOTE — PROGRESS NOTE ADULT - SUBJECTIVE AND OBJECTIVE BOX
Patient is a 86y old  Male who presents with a chief complaint of acute on chronic anemia, worsening AS (17 Aug 2020 10:10)    Patient seen and examined at bedside.    ALLERGIES:  Plavix (Hives)  Toprol-XL (Rash)    MEDICATIONS  (STANDING):  aspirin enteric coated 81 milliGRAM(s) Oral daily  atorvastatin 80 milliGRAM(s) Oral at bedtime  dextrose 5%. 1000 milliLiter(s) (30 mL/Hr) IV Continuous <Continuous>  epoetin juan-epbx (RETACRIT) Injectable 51241 Unit(s) SubCutaneous <User Schedule>  hydrALAZINE 50 milliGRAM(s) Oral two times a day  isosorbide   mononitrate ER Tablet (IMDUR) 30 milliGRAM(s) Oral daily  multivitamin 1 Tablet(s) Oral daily  NIFEdipine XL 60 milliGRAM(s) Oral at bedtime  NIFEdipine XL 90 milliGRAM(s) Oral daily  sertraline 25 milliGRAM(s) Oral at bedtime  torsemide 20 milliGRAM(s) Oral <User Schedule>    MEDICATIONS  (PRN):    Vital Signs Last 24 Hrs  T(F): 97.9 (17 Aug 2020 07:42), Max: 98.7 (16 Aug 2020 14:37)  HR: 104 (17 Aug 2020 07:42) (74 - 104)  BP: 145/66 (17 Aug 2020 07:42) (125/53 - 159/67)  RR: 18 (17 Aug 2020 07:42) (16 - 18)  SpO2: 98% (17 Aug 2020 07:42) (97% - 98%)  I&O's Summary    PHYSICAL EXAM:  General: NAD, alert  ENT: MMM, no thrush  Neck: Supple, No JVD  Lungs: +crackles  Cardio:+s1/s2 +trace edema b/l le  Abdomen: Soft, Nontender, Nondistended; Bowel sounds present  Extremities: No calf tenderness      LABS:                        8.3    7.62  )-----------( 210      ( 17 Aug 2020 06:07 )             24.8     08-17    142  |  98  |  84.0  ----------------------------<  122  3.7   |  25.0  |  5.23    Ca    9.5      17 Aug 2020 06:07    TPro  7.0  /  Alb  4.1  /  TBili  0.6  /  DBili  x   /  AST  21  /  ALT  20  /  AlkPhos  77  08-14    eGFR if Non African American: 9 mL/min/1.73M2 (08-17-20 @ 06:07)  eGFR if : 11 mL/min/1.73M2 (08-17-20 @ 06:07)    PT/INR - ( 14 Aug 2020 19:14 )   PT: 12.7 sec;   INR: 1.10 ratio    PTT - ( 14 Aug 2020 19:14 )  PTT:35.0 sec    CARDIAC MARKERS ( 15 Aug 2020 04:32 )  x     / 0.15 ng/mL / 80 U/L / x     / x      CARDIAC MARKERS ( 14 Aug 2020 19:14 )  x     / 0.15 ng/mL / x     / x     / x        RADIOLOGY & ADDITIONAL TESTS:  < from: Xray Chest 1 View- PORTABLE-Urgent (08.14.20 @ 19:25) >  INTERPRETATION:  AP chest on August 14, 2020 at 7:14 PM. Patient has weakness patient has renal insufficiency, hypertension, and diabetes. There is history of CHF and anemia of chronic renal disease. Patient has blood loss anemia and is being considered for transfusion. Patient is on dialysis.  Heart is magnified by technique. Loop recorder over the left chest again noted.  The mild central congestive findings are again seen but there is improvement from June 23 in this regard.  < end of copied text >    < from: TTE Echo Complete w/o Contrast w/ Doppler (08.16.20 @ 12:39) >  Summary:   1. Severely enlarged left atrium.   2. There is moderate concentric left ventricular hypertrophy.   3. Left ventricular ejection fraction, by visual estimation, is 60 to 65%. Grade III diastolic dysfunction.   4. Elevated mean left atrial pressure. (LAP = 41 mm Hg)   5. Normal right atrial size.   6. Normal right ventricular size and function.   7. Mild mitral valve regurgitation.   8. Moderate to severe aortic valve stenosis.   9. There is no evidence of pericardial effusion.  < end of copied text >    Care Discussed with Consultants/Other Providers:   Cardiology  Nephrology

## 2020-08-17 NOTE — PROGRESS NOTE ADULT - ASSESSMENT
Assessment  Sx severe AS normal E  prior nonobst CAD  ESRD on HD  PVD        Rec  for R and L cath today then CTA  HD after the above

## 2020-08-17 NOTE — PROGRESS NOTE ADULT - ASSESSMENT
A/P: 86y Male with prior history of anomalous right coronary, nonobstructive CAD, hypertension, hyperlipidemia, history of ventricular arrhythmias, history of diastolic CHF, Aortic stenosis, carotid disease, renal failure stage V on HD 2 times a week with persistent volume overload with shortness of breath, RICHARDS with anemia and worsening AS.  He presents today for right and left heart cath with Dr. Mariscal in preparation for TAVR in the near future with Dr. Lilly.  Procedure considerations: Patient s/p recent (6/9/2020) Right SFA POBA accessed via Left Femoral artery and hemostasis obtained with a Mynx; True plavix allergy with development of hives  -NPO for procedure  -High bleed risk--patient aware  -Discussed with Dr. Mariscal  -Plan is for HD today

## 2020-08-17 NOTE — PROGRESS NOTE ADULT - SUBJECTIVE AND OBJECTIVE BOX
Cardiology NP preprocedure note:    -For RHC/LHC    TELE: NSR 1st degree AV block 97 bpm    MEDICATIONS  (STANDING):  aspirin enteric coated 81 milliGRAM(s) Oral daily  atorvastatin 80 milliGRAM(s) Oral at bedtime  dextrose 5%. 1000 milliLiter(s) (30 mL/Hr) IV Continuous <Continuous>  epoetin juan-epbx (RETACRIT) Injectable 38090 Unit(s) SubCutaneous <User Schedule>  hydrALAZINE 50 milliGRAM(s) Oral two times a day  isosorbide   mononitrate ER Tablet (IMDUR) 30 milliGRAM(s) Oral daily  multivitamin 1 Tablet(s) Oral daily  NIFEdipine XL 60 milliGRAM(s) Oral at bedtime  NIFEdipine XL 90 milliGRAM(s) Oral daily  sertraline 25 milliGRAM(s) Oral at bedtime  torsemide 20 milliGRAM(s) Oral <User Schedule>    Allergies:  Plavix (Hives)  Toprol-XL (Rash)      PAST MEDICAL & SURGICAL HISTORY:  GI bleeding: due to ulcerated polyps and colonic AVMs  Aortic stenosis  ESRD on dialysis: HD on  and   Risk factors for obstructive sleep apnea  Anemia  RICHARDS (dyspnea on exertion)  VT (ventricular tachycardia)  HTN (hypertension)  CAD (coronary artery disease)  Arrhythmia  AV block, 1st degree  DM (diabetes mellitus)  H/O carotid endarterectomy: Right  A-V fistula: left arm 2017  H/O angioplasty: ,  no  intervention  H/O left knee surgery  H/O circumcision: at  age  65      Vital Signs Last 24 Hrs  T(C): 36.6 (17 Aug 2020 07:42), Max: 37.1 (16 Aug 2020 14:37)  T(F): 97.9 (17 Aug 2020 07:42), Max: 98.7 (16 Aug 2020 14:37)  HR: 104 (17 Aug 2020 07:42) (74 - 104)  BP: 145/66 (17 Aug 2020 07:42) (125/53 - 159/67)  BP(mean): --  RR: 18 (17 Aug 2020 07:42) (16 - 18)  SpO2: 98% (17 Aug 2020 07:42) (97% - 98%)    Physical Exam:  Constitutional: NAD, AAOx3  Cardiovascular: +S1S2 RRR  Pulmonary: CTA b/l, unlabored  GI: soft NTND +BS  Extremities: no pedal edema, +distal pulses b/l  Neuro: non focal, MARQUEZ x4  ASA 3  Mallampati 2  GFR 9  BRA 12.7%    LABS:                        8.3    7.62  )-----------( 210      ( 17 Aug 2020 06:07 )             24.8     08    142  |  98  |  84.0<H>  ----------------------------<  122<H>  3.7   |  25.0  |  5.23<H>    Ca    9.5      17 Aug 2020 06:07      RADIOLOGY & ADDITIONAL TESTS:  < from: TTE Echo Complete w/o Contrast w/ Doppler (20 @ 12:39) >  PHYSICIAN INTERPRETATION:  Left Ventricle: The left ventricular internal cavity size is normal.  Global LV systolic function was normal. Left ventricular ejection fraction, by visual estimation, is 60 to 65%. Spectral Doppler shows restrictive pattern of left ventricular myocardial filling (Grade III diastolic dysfunction). Elevated mean left atrial pressure.  Right Ventricle: Normal right ventricular size and function.  Left Atrium: Severely enlarged left atrium.  Right Atrium: Normal right atrial size.  Pericardium: There is no evidence of pericardial effusion.  Mitral Valve: There is moderate mitral annular calcification. Peak transmitral mean gradient equals 5.0 mmHg, calculated mitral valve area by pressure half time equals 2.56 cm² consistent with No evidence of mitral stenosis. Mild mitral valve regurgitation is seen.  Tricuspid Valve: The tricuspid valve is normal in structure. Trivial tricuspid regurgitation is visualized. Adequate TR velocity was not obtained to accurately assess RVSP.  Aortic Valve: The aortic valve is trileaflet. Moderate to severe aortic stenosis is present. Peak transaortic gradient equals 54.8 mmHg, mean transaortic gradient equals 35.0 mmHg, the calculated aortic valve area equals 0.83 cm² by the continuity equation consistent with severe aortic stenosis. The peak aortic velocity was obtained from the apical view. Trivial aortic valve regurgitation is seen. The Dimesionless Index value is .26.  Pulmonic Valve: The pulmonic valve was not well visualized. Trace pulmonic valve regurgitation.  Aorta: Dilated Ao root at 3.9cm.  Pulmonary Artery: The pulmonary artery is of normal size and origin.  Venous: The pulmonary veins appear normal. The inferior vena cava was dilated, with respiratory size variation greater than 50%.      Summary:   1. Severely enlarged left atrium.   2. There is moderate concentric left ventricular hypertrophy.   3. Left ventricular ejection fraction, by visual estimation, is 60 to 65%. Grade III diastolic dysfunction.   4. Elevated mean left atrial pressure. (LAP = 41 mm Hg)   5. Normal right atrial size.   6. Normal right ventricular size and function.   7. Mild mitral valve regurgitation.   8. Moderate to severe aortic valve stenosis.   9. There is no evidence of pericardial effusion.    MD Rohith, RPVI Electronically signed on 2020 at 3:29:26 PM    < end of copied text >  < from: MR Cardiac No Cont (05.. @ 13:42) >    FINDINGS:      Concentric left ventricular hypertrophy. The left ventricle internal dimension is qualitatively normal in size  with normal systolic function.     The right ventricle is qualitatively normal in size  with normal systolic function.     Mitral valve leaflet thickening. The left atrium is dilated in size. There is qualitatively no significant mitral regurgitation.    The right atrium is normal in size. There is qualitatively trace tricuspid regurgitation.    Qualitatively, there is aortic stenosis. No aortic regurgitation.    The thoracic aorta is normal in diameter.    No pericardial effusion.    REGIONAL FUNCTION     No segmental wall motion abnormality.      QUANTITATIVE ANALYSIS     Quantitative functional parameters obtained from integration of ventricular volumes at end diastole and end systole are as follows (normal range):     Height: 1 67 cm;  Weight: 68 kg;  BSA: 1.78 m^2      LVEF: 61% (normal: male = 56-78%; female = 56-78%)     LVEDV: 153 ml (normal: male =  ml; female =  ml)     LVESV: 60 ml (normal: male = 19-72 ml; female = 13-51 ml)     SV: 93 ml (normal: male =  ml; female = 33-97)     Indexed LVEDVi: 86 ml/sq m (normal: male= 47-92 ml/sq m; female= 41-81 ml/sq m)     RV measurements    RVEF: 71% (normal: male = 52-72%; female = 51-71%)     RVEDV: 133 ml (normal: male = 118-250 ml; female = 77-201ml)     RVESV: 38 ml (normal: male =  ml; female = 24-84 ml)     SV: 95 ml (normal: male =  ml; female = )     Indexed RVEDVi: 75 ml/sq m (normal: male=  ml/sq m; female=  ml/sq m)     AORTIC FLOW:     Forward volume: 87 ml     Reverse volume: 0 ml    Aortic peak velocity: 4 m/sec     Max Pressure Gradient: 62.94 mmHg    Mean Pressure Gradient: 11.28 mmHg    Aortic Valve Area (HARSH): 1.6 cm? (on series 16 image 6)    PULMONIC FLOW:     Forward volume: 93 ml     Reverse volume: 0 ml    Pulmonic peak velocity: 1.7 m/sec     Max Pressure Gradient: 11.63 mmHg    Mean Pressure Gradient: 2.23 mmHg      Additional findings: Trace pleural effusions. Please note this examination is focused on the heart.       IMPRESSION:     1.  Concentric left ventricular hypertrophy. The LV internal dimension is normalin size with normal systolic function; LVEF: 61%.    2.  Moderate aortic stenosis according to aortic peak velocity of 4 m/sec and maximum peak pressure gradient of 62.94 mmHg. HARSH: 1.6 cm?      < end of copied text >  < from: Cardiac Cath Lab - Adult (20 @ 08:19) >  The patient was brought to the lab for right leg angiogram with CO2. After  gaining access with micropuncture under ultrasound guidance, the catheter  was advanced up and over aortic bifurcation and parked on right common  femoral artery. Angiogram revealed distal sfa greater than 90%stenosis and  2vessel runoff. the stenosis was crossed with wire and POBA performed with  mustang balloon. Tolerated well. Flow significantly improved.  PROCEDURE:  --  Right leg angiography.  --  Sonosite.  --  Sheath Exchange for Interventio  --  Hemostasis with Mynx-Intervention.  --  Intervention on right superficial femoral: balloon angioplasty.  Local anesthetic given. Left femoral artery access. Sheath exchange. The  sheath in the left femoral artery was exchanged. Right leg angiography. A  catheter was positioned. Sonosite. RADIATION EXPOSURE: 21.7 min. Aballoon  angioplasty was performed on the lesion in the right superficial femoral  artery. There was no dissection. Balloon angioplasty was performed, using  a ASTRID MONORAIL 5.0MM X 40MM X 135CM balloon, with 4 inflations and a  maximum inflationpressure of 12 mariusz. During the procedure, a new 260CM  ADVANTAGE GLIDEWIRE wire was advanced across the lesion. Sheath Exchange  for Intervention. Hemostasis with Mynx-Intervention.  MEDICATIONS GIVEN: Midazolam, 0.5 mg, IV. Fentanyl, 25 mcg, IV. Midazolam,  0.5 mg, IV. Fentanyl, 25 mcg, IV. Heparin, 4000 units, IV. Aspirin, 81 mg,  PO. 1% Lidocaine, 10 ml, subcutaneously. 0.9NS, 50 ml, IV.  RIGHT LOWER EXTREMITY VESSELS: Right superficial femoral:  COMPLICATIONS: No complications occurred duringthe cath lab visit.  Prepared and signed by  Siva Albrecht MD  Signed 06/15/2020 20:07:20  HEMODYNAMIC TABLES  Outputs:  NO PHASE  Outputs:  -- CALCULATIONS[age]  Outputs:  -- CALCULATIONS: Body Surface Area: 1.76  Outputs:  -- CALCULATIONS: Height in cm: 165.00  Outputs:  -- CALCULATIONS: Sex: Male  Outputs:  -- CALCULATIONS: Weight in k.40  Outputs:  -- OUTPUTS: O2 consumption: 220.56  Outputs:  -- OUTPUTS: Vo2 Indexed: 125.00    < end of copied text > Cardiology NP preprocedure note:    -For RHC/LHC    TELE: NSR 1st degree AV block 97 bpm    MEDICATIONS  (STANDING):  aspirin enteric coated 81 milliGRAM(s) Oral daily  atorvastatin 80 milliGRAM(s) Oral at bedtime  dextrose 5%. 1000 milliLiter(s) (30 mL/Hr) IV Continuous <Continuous>  epoetin juan-epbx (RETACRIT) Injectable 35676 Unit(s) SubCutaneous <User Schedule>  hydrALAZINE 50 milliGRAM(s) Oral two times a day  isosorbide   mononitrate ER Tablet (IMDUR) 30 milliGRAM(s) Oral daily  multivitamin 1 Tablet(s) Oral daily  NIFEdipine XL 60 milliGRAM(s) Oral at bedtime  NIFEdipine XL 90 milliGRAM(s) Oral daily  sertraline 25 milliGRAM(s) Oral at bedtime  torsemide 20 milliGRAM(s) Oral <User Schedule>    Allergies:  Plavix (Hives)  Toprol-XL (Rash)      PAST MEDICAL & SURGICAL HISTORY:  GI bleeding: due to ulcerated polyps and colonic AVMs  Aortic stenosis  ESRD on dialysis: HD on  and   Risk factors for obstructive sleep apnea  Anemia  RICHARDS (dyspnea on exertion)  VT (ventricular tachycardia)  HTN (hypertension)  CAD (coronary artery disease)  Arrhythmia  AV block, 1st degree  DM (diabetes mellitus)  H/O carotid endarterectomy: Right  A-V fistula: left arm 2017  H/O angioplasty: ,  no  intervention  H/O left knee surgery  H/O circumcision: at  age  65      Vital Signs Last 24 Hrs  T(C): 36.6 (17 Aug 2020 07:42), Max: 37.1 (16 Aug 2020 14:37)  T(F): 97.9 (17 Aug 2020 07:42), Max: 98.7 (16 Aug 2020 14:37)  HR: 104 (17 Aug 2020 07:42) (74 - 104)  BP: 145/66 (17 Aug 2020 07:42) (125/53 - 159/67)  BP(mean): --  RR: 18 (17 Aug 2020 07:42) (16 - 18)  SpO2: 98% (17 Aug 2020 07:42) (97% - 98%)    Physical Exam:  Constitutional: NAD, AAOx3  Cardiovascular: +S1S2 RRR  Pulmonary: CTA b/l, unlabored  GI: soft NTND +BS  Extremities: no pedal edema, +distal LE pulses b/l via doppler; + 2 bilateral radial pulses  Neuro: non focal, MARQUEZ x4  ASA 3  Mallampati 2  GFR 9  BRA 12.7%    LABS:                        8.3    7.62  )-----------( 210      ( 17 Aug 2020 06:07 )             24.8     08-    142  |  98  |  84.0<H>  ----------------------------<  122<H>  3.7   |  25.0  |  5.23<H>    Ca    9.5      17 Aug 2020 06:07      RADIOLOGY & ADDITIONAL TESTS:  < from: TTE Echo Complete w/o Contrast w/ Doppler (20 @ 12:39) >  PHYSICIAN INTERPRETATION:  Left Ventricle: The left ventricular internal cavity size is normal.  Global LV systolic function was normal. Left ventricular ejection fraction, by visual estimation, is 60 to 65%. Spectral Doppler shows restrictive pattern of left ventricular myocardial filling (Grade III diastolic dysfunction). Elevated mean left atrial pressure.  Right Ventricle: Normal right ventricular size and function.  Left Atrium: Severely enlarged left atrium.  Right Atrium: Normal right atrial size.  Pericardium: There is no evidence of pericardial effusion.  Mitral Valve: There is moderate mitral annular calcification. Peak transmitral mean gradient equals 5.0 mmHg, calculated mitral valve area by pressure half time equals 2.56 cm² consistent with No evidence of mitral stenosis. Mild mitral valve regurgitation is seen.  Tricuspid Valve: The tricuspid valve is normal in structure. Trivial tricuspid regurgitation is visualized. Adequate TR velocity was not obtained to accurately assess RVSP.  Aortic Valve: The aortic valve is trileaflet. Moderate to severe aortic stenosis is present. Peak transaortic gradient equals 54.8 mmHg, mean transaortic gradient equals 35.0 mmHg, the calculated aortic valve area equals 0.83 cm² by the continuity equation consistent with severe aortic stenosis. The peak aortic velocity was obtained from the apical view. Trivial aortic valve regurgitation is seen. The Dimesionless Index value is .26.  Pulmonic Valve: The pulmonic valve was not well visualized. Trace pulmonic valve regurgitation.  Aorta: Dilated Ao root at 3.9cm.  Pulmonary Artery: The pulmonary artery is of normal size and origin.  Venous: The pulmonary veins appear normal. The inferior vena cava was dilated, with respiratory size variation greater than 50%.      Summary:   1. Severely enlarged left atrium.   2. There is moderate concentric left ventricular hypertrophy.   3. Left ventricular ejection fraction, by visual estimation, is 60 to 65%. Grade III diastolic dysfunction.   4. Elevated mean left atrial pressure. (LAP = 41 mm Hg)   5. Normal right atrial size.   6. Normal right ventricular size and function.   7. Mild mitral valve regurgitation.   8. Moderate to severe aortic valve stenosis.   9. There is no evidence of pericardial effusion.    MD Rohith, RPVI Electronically signed on 2020 at 3:29:26 PM    < end of copied text >  < from: MR Cardiac No Cont (20 @ 13:42) >    FINDINGS:      Concentric left ventricular hypertrophy. The left ventricle internal dimension is qualitatively normal in size  with normal systolic function.     The right ventricle is qualitatively normal in size  with normal systolic function.     Mitral valve leaflet thickening. The left atrium is dilated in size. There is qualitatively no significant mitral regurgitation.    The right atrium is normal in size. There is qualitatively trace tricuspid regurgitation.    Qualitatively, there is aortic stenosis. No aortic regurgitation.    The thoracic aorta is normal in diameter.    No pericardial effusion.    REGIONAL FUNCTION     No segmental wall motion abnormality.      QUANTITATIVE ANALYSIS     Quantitative functional parameters obtained from integration of ventricular volumes at end diastole and end systole are as follows (normal range):     Height: 1 67 cm;  Weight: 68 kg;  BSA: 1.78 m^2      LVEF: 61% (normal: male = 56-78%; female = 56-78%)     LVEDV: 153 ml (normal: male =  ml; female =  ml)     LVESV: 60 ml (normal: male = 19-72 ml; female = 13-51 ml)     SV: 93 ml (normal: male =  ml; female = 33-97)     Indexed LVEDVi: 86 ml/sq m (normal: male= 47-92 ml/sq m; female= 41-81 ml/sq m)     RV measurements    RVEF: 71% (normal: male = 52-72%; female = 51-71%)     RVEDV: 133 ml (normal: male = 118-250 ml; female = 77-201ml)     RVESV: 38 ml (normal: male =  ml; female = 24-84 ml)     SV: 95 ml (normal: male =  ml; female = )     Indexed RVEDVi: 75 ml/sq m (normal: male=  ml/sq m; female=  ml/sq m)     AORTIC FLOW:     Forward volume: 87 ml     Reverse volume: 0 ml    Aortic peak velocity: 4 m/sec     Max Pressure Gradient: 62.94 mmHg    Mean Pressure Gradient: 11.28 mmHg    Aortic Valve Area (HARSH): 1.6 cm? (on series 16 image 6)    PULMONIC FLOW:     Forward volume: 93 ml     Reverse volume: 0 ml    Pulmonic peak velocity: 1.7 m/sec     Max Pressure Gradient: 11.63 mmHg    Mean Pressure Gradient: 2.23 mmHg      Additional findings: Trace pleural effusions. Please note this examination is focused on the heart.       IMPRESSION:     1.  Concentric left ventricular hypertrophy. The LV internal dimension is normalin size with normal systolic function; LVEF: 61%.    2.  Moderate aortic stenosis according to aortic peak velocity of 4 m/sec and maximum peak pressure gradient of 62.94 mmHg. HARSH: 1.6 cm?      < end of copied text >  < from: Cardiac Cath Lab - Adult (20 @ 08:19) >  The patient was brought to the lab for right leg angiogram with CO2. After  gaining access with micropuncture under ultrasound guidance, the catheter  was advanced up and over aortic bifurcation and parked on right common  femoral artery. Angiogram revealed distal sfa greater than 90%stenosis and  2vessel runoff. the stenosis was crossed with wire and POBA performed with  mustang balloon. Tolerated well. Flow significantly improved.  PROCEDURE:  --  Right leg angiography.  --  Sonosite.  --  Sheath Exchange for Interventio  --  Hemostasis with Mynx-Intervention.  --  Intervention on right superficial femoral: balloon angioplasty.  Local anesthetic given. Left femoral artery access. Sheath exchange. The  sheath in the left femoral artery was exchanged. Right leg angiography. A  catheter was positioned. Sonosite. RADIATION EXPOSURE: 21.7 min. Aballoon  angioplasty was performed on the lesion in the right superficial femoral  artery. There was no dissection. Balloon angioplasty was performed, using  a ASTRID MONORAIL 5.0MM X 40MM X 135CM balloon, with 4 inflations and a  maximum inflationpressure of 12 mariusz. During the procedure, a new 260CM  ADVANTAGE GLIDEWIRE wire was advanced across the lesion. Sheath Exchange  for Intervention. Hemostasis with Mynx-Intervention.  MEDICATIONS GIVEN: Midazolam, 0.5 mg, IV. Fentanyl, 25 mcg, IV. Midazolam,  0.5 mg, IV. Fentanyl, 25 mcg, IV. Heparin, 4000 units, IV. Aspirin, 81 mg,  PO. 1% Lidocaine, 10 ml, subcutaneously. 0.9NS, 50 ml, IV.  RIGHT LOWER EXTREMITY VESSELS: Right superficial femoral:  COMPLICATIONS: No complications occurred duringthe cath lab visit.  Prepared and signed by  Siva Albrecht MD  Signed 06/15/2020 20:07:20  HEMODYNAMIC TABLES  Outputs:  NO PHASE  Outputs:  -- CALCULATIONS[age]  Outputs:  -- CALCULATIONS: Body Surface Area: 1.76  Outputs:  -- CALCULATIONS: Height in cm: 165.00  Outputs:  -- CALCULATIONS: Sex: Male  Outputs:  -- CALCULATIONS: Weight in k.40  Outputs:  -- OUTPUTS: O2 consumption: 220.56  Outputs:  -- OUTPUTS: Vo2 Indexed: 125.00    < end of copied text >

## 2020-08-17 NOTE — PROGRESS NOTE ADULT - SUBJECTIVE AND OBJECTIVE BOX
[facility] DIVISION OF KIDNEY DISEASES AND HYPERTENSION -- FOLLOW UP NOTE  --------------------------------------------------------------------------------  Chief Complaint: Fatigue, Depressed, Anxious,     24 hour events/subjective:    PAST HISTORY  --------------------------------------------------------------------------------  No significant changes to PMH, PSH, FHx, SHx, unless otherwise noted    ALLERGIES & MEDICATIONS  --------------------------------------------------------------------------------  Allergies    Plavix (Hives)  Toprol-XL (Rash)    Intolerances : None,     Standing Inpatient Medications  aspirin enteric coated 81 milliGRAM(s) Oral daily  atorvastatin 80 milliGRAM(s) Oral at bedtime  epoetin juan-epbx (RETACRIT) Injectable 71046 Unit(s) SubCutaneous <User Schedule>  hydrALAZINE 50 milliGRAM(s) Oral two times a day  isosorbide   mononitrate ER Tablet (IMDUR) 30 milliGRAM(s) Oral daily  multivitamin 1 Tablet(s) Oral daily  NIFEdipine XL 60 milliGRAM(s) Oral at bedtime  NIFEdipine XL 90 milliGRAM(s) Oral daily  sertraline 25 milliGRAM(s) Oral at bedtime  torsemide 20 milliGRAM(s) Oral <User Schedule>    PRN Inpatient Medications : None,     REVIEW OF SYSTEMS  --------------------------------------------------------------------------------  Gen: No weight changes, fatigue, fevers/chills, weakness  Skin: No rashes  Head/Eyes/Ears/Mouth: No headache; Normal hearing; Normal vision w/o blurriness; No sinus pain/discomfort, sore throat  Respiratory: No dyspnea, cough, wheezing, hemoptysis  CV: No chest pain, PND, orthopnea  GI: No abdominal pain, diarrhea, constipation, nausea, vomiting, melena, hematochezia  : No increased frequency, dysuria, hematuria, nocturia  MSK: No joint pain/swelling; no back pain; no edema  Neuro: No dizziness/lightheadedness, weakness, seizures, numbness, tingling  Heme: No easy bruising or bleeding  Endo: No heat/cold intolerance  Psych: No significant nervousness, anxiety, stress, depression    All other systems were reviewed and are negative, except as noted.    VITALS/PHYSICAL EXAM  --------------------------------------------------------------------------------  T(C): 36.6 (08-17-20 @ 07:42), Max: 37.1 (08-16-20 @ 14:37)  HR: 104 (08-17-20 @ 07:42) (74 - 104)  BP: 145/66 (08-17-20 @ 07:42) (125/53 - 159/67)  RR: 18 (08-17-20 @ 07:42) (16 - 18)  SpO2: 98% (08-17-20 @ 07:42) (97% - 98%)    Physical Exam:  	Gen: NAD, well-appearing  	HEENT: PERRL, supple neck, + Carotid Bruits,   	Pulm: CTA B/L  	CV: RRR, S1S2; no rub, Harsh DEANA,   	Back: No spinal or CVA tenderness; no sacral edema  	Abd: +BS, soft, nontender/nondistended  	: No suprapubic tenderness  	UE: Warm, FROM, no clubbing, intact strength; no edema; no asterixis  	LE: Warm, FROM, no clubbing, intact strength; no edema  	Neuro: No focal deficits, intact gait  	Psych: Normal affect and mood  	Skin: Warm, without rashes  	Vascular access: AVF,     LABS/STUDIES  --------------------------------------------------------------------------------              8.3    7.62  >-----------<  210      [08-17-20 @ 06:07]              24.8     142  |  98  |  84.0  ----------------------------<  122      [08-17-20 @ 06:07]  3.7   |  25.0  |  5.23        Ca     9.5     [08-17-20 @ 06:07]    Creatinine Trend:  SCr 5.23 [08-17 @ 06:07]  SCr 5.00 [08-16 @ 10:44]  SCr 3.43 [08-15 @ 04:32]  SCr 2.44 [08-14 @ 19:14]    Iron 38, TIBC 289, %sat 13      [06-10-20 @ 06:29]  Ferritin 83      [06-10-20 @ 06:29]  HbA1c 4.9      [08-09-18 @ 05:54]

## 2020-08-17 NOTE — PROGRESS NOTE ADULT - SUBJECTIVE AND OBJECTIVE BOX
[place] [facility] - [facility], THE HEART CENTER                                   [address] [place] [address]                                                      PHONE: [phone]                                                         FAX: [phone]  [email or web address]  ---------------------------------------------------------------------------------------------------------------------------------    Overnight events/patient complaints: no complaints, tele SR, no evidence of CHF, for cath and CTA today and HD post      Plavix (Hives)  Toprol-XL (Rash)    MEDICATIONS  (STANDING):  aspirin enteric coated 81 milliGRAM(s) Oral daily  atorvastatin 80 milliGRAM(s) Oral at bedtime  epoetin juan-epbx (RETACRIT) Injectable 62277 Unit(s) SubCutaneous <User Schedule>  hydrALAZINE 50 milliGRAM(s) Oral two times a day  isosorbide   mononitrate ER Tablet (IMDUR) 30 milliGRAM(s) Oral daily  multivitamin 1 Tablet(s) Oral daily  NIFEdipine XL 60 milliGRAM(s) Oral at bedtime  NIFEdipine XL 90 milliGRAM(s) Oral daily  sertraline 25 milliGRAM(s) Oral at bedtime  torsemide 20 milliGRAM(s) Oral <User Schedule>    MEDICATIONS  (PRN):      Vital Signs Last 24 Hrs  T(C): 36.6 (17 Aug 2020 07:42), Max: 37.1 (16 Aug 2020 14:37)  T(F): 97.9 (17 Aug 2020 07:42), Max: 98.7 (16 Aug 2020 14:37)  HR: 104 (17 Aug 2020 07:42) (74 - 104)  BP: 145/66 (17 Aug 2020 07:42) (122/70 - 159/67)  BP(mean): --  RR: 18 (17 Aug 2020 07:42) (16 - 18)  SpO2: 98% (17 Aug 2020 07:42) (97% - 98%)  Daily     Daily Weight in k.2 (17 Aug 2020 06:01)  ICU Vital Signs Last 24 Hrs  CHRISTIANO ELIZABETH  I&O's Detail    I&O's Summary    Drug Dosing Weight  CHRISTIANO ELIZABETH      PHYSICAL EXAM:  General: Appears well developed, well nourished alert and cooperative.  HEENT: Head; normocephalic, atraumatic.  Eyes: Pupils reactive, cornea wnl.  Neck: Supple, no nodes adenopathy, no NVD or carotid bruit or thyromegaly.  CARDIOVASCULAR: Normal S1 absent S2 3/6 systolic murmur  LUNGS: No rales, rhonchi or wheeze. Normal breath sounds bilaterally.  ABDOMEN: Soft, nontender without mass or organomegaly. bowel sounds normoactive.  EXTREMITIES: No clubbing, cyanosis or edema. Distal pulses wnl.   SKIN: warm and dry with normal turgor.  NEURO: Alert/oriented x 3/normal motor exam. No pathologic reflexes.    PSYCH: normal affect.        LABS:                        8.3    7.62  )-----------( 210      ( 17 Aug 2020 06:07 )             24.8     08-    142  |  98  |  84.0<H>  ----------------------------<  122<H>  3.7   |  25.0  |  5.23<H>    Ca    9.5      17 Aug 2020 06:07      CHRISTIANO ELIZABETH            RADIOLOGY & ADDITIONAL STUDIES:    INTERPRETATION OF TELEMETRY (personally reviewed):    ECG:    ECHO:< from: TTE Echo Complete w/o Contrast w/ Doppler (20 @ 12:39) >    Summary:   1. Severely enlarged left atrium.   2. There is moderate concentric left ventricular hypertrophy.   3. Left ventricular ejection fraction, by visual estimation, is 60 to 65%. Grade III diastolic dysfunction.   4. Elevated mean left atrial pressure. (LAP = 41 mm Hg)   5. Normal right atrial size.   6. Normal right ventricular size and function.   7. Mild mitral valve regurgitation.   8. Moderate to severe aortic valve stenosis.   9. There is no evidence of pericardial effusion.    MD Rohith, RPVI Electronically signed on 2020 at 3:29:26 PM            *** Final ***                LUIS ANTONIO CARSON M.D., ATTENDING CARDIOLOGY  This document has been electronicallysigned. Aug 16 2020 12:39PM    < end of copied text >      STRESS TEST:

## 2020-08-18 ENCOUNTER — APPOINTMENT (OUTPATIENT)
Dept: CARDIOTHORACIC SURGERY | Facility: CLINIC | Age: 85
End: 2020-08-18

## 2020-08-18 DIAGNOSIS — I35.0 NONRHEUMATIC AORTIC (VALVE) STENOSIS: ICD-10-CM

## 2020-08-18 DIAGNOSIS — D64.9 ANEMIA, UNSPECIFIED: ICD-10-CM

## 2020-08-18 DIAGNOSIS — I25.10 ATHEROSCLEROTIC HEART DISEASE OF NATIVE CORONARY ARTERY WITHOUT ANGINA PECTORIS: ICD-10-CM

## 2020-08-18 PROBLEM — N18.6 END STAGE RENAL DISEASE: Chronic | Status: ACTIVE | Noted: 2020-08-15

## 2020-08-18 PROBLEM — K92.2 GASTROINTESTINAL HEMORRHAGE, UNSPECIFIED: Chronic | Status: ACTIVE | Noted: 2020-08-15

## 2020-08-18 LAB
ANION GAP SERPL CALC-SCNC: 15 MMOL/L — SIGNIFICANT CHANGE UP (ref 5–17)
BUN SERPL-MCNC: 32 MG/DL — HIGH (ref 8–20)
CALCIUM SERPL-MCNC: 9.6 MG/DL — SIGNIFICANT CHANGE UP (ref 8.6–10.2)
CHLORIDE SERPL-SCNC: 97 MMOL/L — LOW (ref 98–107)
CO2 SERPL-SCNC: 28 MMOL/L — SIGNIFICANT CHANGE UP (ref 22–29)
CREAT SERPL-MCNC: 3.18 MG/DL — HIGH (ref 0.5–1.3)
GLUCOSE SERPL-MCNC: 118 MG/DL — HIGH (ref 70–99)
HCT VFR BLD CALC: 28 % — LOW (ref 39–50)
HGB BLD-MCNC: 9.2 G/DL — LOW (ref 13–17)
MCHC RBC-ENTMCNC: 30.3 PG — SIGNIFICANT CHANGE UP (ref 27–34)
MCHC RBC-ENTMCNC: 32.9 GM/DL — SIGNIFICANT CHANGE UP (ref 32–36)
MCV RBC AUTO: 92.1 FL — SIGNIFICANT CHANGE UP (ref 80–100)
PLATELET # BLD AUTO: 227 K/UL — SIGNIFICANT CHANGE UP (ref 150–400)
POTASSIUM SERPL-MCNC: 3.5 MMOL/L — SIGNIFICANT CHANGE UP (ref 3.5–5.3)
POTASSIUM SERPL-SCNC: 3.5 MMOL/L — SIGNIFICANT CHANGE UP (ref 3.5–5.3)
RBC # BLD: 3.04 M/UL — LOW (ref 4.2–5.8)
RBC # FLD: 16.7 % — HIGH (ref 10.3–14.5)
SODIUM SERPL-SCNC: 140 MMOL/L — SIGNIFICANT CHANGE UP (ref 135–145)
WBC # BLD: 8.79 K/UL — SIGNIFICANT CHANGE UP (ref 3.8–10.5)
WBC # FLD AUTO: 8.79 K/UL — SIGNIFICANT CHANGE UP (ref 3.8–10.5)

## 2020-08-18 PROCEDURE — 90937 HEMODIALYSIS REPEATED EVAL: CPT

## 2020-08-18 PROCEDURE — 99233 SBSQ HOSP IP/OBS HIGH 50: CPT

## 2020-08-18 PROCEDURE — 99232 SBSQ HOSP IP/OBS MODERATE 35: CPT

## 2020-08-18 RX ORDER — CHLORHEXIDINE GLUCONATE 213 G/1000ML
1 SOLUTION TOPICAL
Refills: 0 | Status: DISCONTINUED | OUTPATIENT
Start: 2020-08-18 | End: 2020-08-21

## 2020-08-18 RX ORDER — CHLORHEXIDINE GLUCONATE 213 G/1000ML
15 SOLUTION TOPICAL
Refills: 0 | Status: DISCONTINUED | OUTPATIENT
Start: 2020-08-18 | End: 2020-08-21

## 2020-08-18 RX ADMIN — ERYTHROPOIETIN 12000 UNIT(S): 10000 INJECTION, SOLUTION INTRAVENOUS; SUBCUTANEOUS at 12:16

## 2020-08-18 RX ADMIN — Medication 50 MILLIGRAM(S): at 17:02

## 2020-08-18 RX ADMIN — Medication 1 TABLET(S): at 10:32

## 2020-08-18 RX ADMIN — Medication 81 MILLIGRAM(S): at 10:32

## 2020-08-18 RX ADMIN — Medication 90 MILLIGRAM(S): at 05:44

## 2020-08-18 RX ADMIN — ISOSORBIDE MONONITRATE 30 MILLIGRAM(S): 60 TABLET, EXTENDED RELEASE ORAL at 10:32

## 2020-08-18 RX ADMIN — SERTRALINE 25 MILLIGRAM(S): 25 TABLET, FILM COATED ORAL at 21:47

## 2020-08-18 RX ADMIN — Medication 50 MILLIGRAM(S): at 05:44

## 2020-08-18 RX ADMIN — ATORVASTATIN CALCIUM 80 MILLIGRAM(S): 80 TABLET, FILM COATED ORAL at 21:46

## 2020-08-18 RX ADMIN — Medication 60 MILLIGRAM(S): at 21:47

## 2020-08-18 NOTE — PROGRESS NOTE ADULT - ASSESSMENT
86yoM hx ESRD on HD on M/F only, HFpEF, HTN, AS, prior GI bleed from ulcerated polyps and colonic AVMs presenting with worsening weakness fatigue after missing HD session with epogen injection once this week found to have worsening anemia on outpatient labs, also with worsening AS.

## 2020-08-18 NOTE — PROGRESS NOTE ADULT - SUBJECTIVE AND OBJECTIVE BOX
Patient is a 86y old  Male who presents with a chief complaint of acute on chronic anemia, worsening AS (17 Aug 2020 14:18)    Patient seen and examined at bedside.     ALLERGIES:  Plavix (Hives)  Toprol-XL (Rash)    MEDICATIONS  (STANDING):  aspirin enteric coated 81 milliGRAM(s) Oral daily  atorvastatin 80 milliGRAM(s) Oral at bedtime  dextrose 5%. 1000 milliLiter(s) (30 mL/Hr) IV Continuous <Continuous>  epoetin juan-epbx (RETACRIT) Injectable 99299 Unit(s) SubCutaneous <User Schedule>  hydrALAZINE 50 milliGRAM(s) Oral two times a day  isosorbide   mononitrate ER Tablet (IMDUR) 30 milliGRAM(s) Oral daily  multivitamin 1 Tablet(s) Oral daily  NIFEdipine XL 60 milliGRAM(s) Oral at bedtime  NIFEdipine XL 90 milliGRAM(s) Oral daily  sertraline 25 milliGRAM(s) Oral at bedtime  torsemide 20 milliGRAM(s) Oral <User Schedule>    MEDICATIONS  (PRN):    Vital Signs Last 24 Hrs  T(F): 98.1 (18 Aug 2020 05:42), Max: 98.5 (17 Aug 2020 14:31)  HR: 80 (18 Aug 2020 05:42) (72 - 108)  BP: 151/67 (18 Aug 2020 05:42) (90/42 - 159/85)  RR: 16 (18 Aug 2020 05:42) (16 - 18)  SpO2: 98% (18 Aug 2020 05:42) (93% - 100%)  I&O's Summary    17 Aug 2020 07:01  -  18 Aug 2020 07:00  --------------------------------------------------------  IN: 200 mL / OUT: 2125 mL / NET: -1925 mL      PHYSICAL EXAM:  General: NAD, alert  ENT: MMM, no thrush  Neck: Supple, No JVD  Lungs: +crackles  Cardio:+s1/s2 +trace edema b/l le  Abdomen: Soft, Nontender, Nondistended; Bowel sounds present  Extremities: No calf tenderness      LABS:                        9.2    8.79  )-----------( 227      ( 18 Aug 2020 06:56 )             28.0     08-18    140  |  97  |  32.0  ----------------------------<  118  3.5   |  28.0  |  3.18    Ca    9.6      18 Aug 2020 06:56    eGFR if Non African American: 17 mL/min/1.73M2 (08-18-20 @ 06:56)  eGFR if : 19 mL/min/1.73M2 (08-18-20 @ 06:56)    RADIOLOGY & ADDITIONAL TESTS:  < from: Xray Chest 1 View- PORTABLE-Urgent (08.14.20 @ 19:25) >  INTERPRETATION:  AP chest on August 14, 2020 at 7:14 PM. Patient has weakness patient has renal insufficiency, hypertension, and diabetes. There is history of CHF and anemia of chronic renal disease. Patient has blood loss anemia and is being considered for transfusion. Patient is on dialysis.  Heart is magnified by technique. Loop recorder over the left chest again noted.  The mild central congestive findings are again seen but there is improvement from June 23 in this regard.  < end of copied text >    < from: TTE Echo Complete w/o Contrast w/ Doppler (08.16.20 @ 12:39) >  Summary:   1. Severely enlarged left atrium.   2. There is moderate concentric left ventricular hypertrophy.   3. Left ventricular ejection fraction, by visual estimation, is 60 to 65%. Grade III diastolic dysfunction.   4. Elevated mean left atrial pressure. (LAP = 41 mm Hg)   5. Normal right atrial size.   6. Normal right ventricular size and function.   7. Mild mitral valve regurgitation.   8. Moderate to severe aortic valve stenosis.   9. There is no evidence of pericardial effusion.  < end of copied text >    Care Discussed with Consultants/Other Providers:   CT Surgery   Cardiology

## 2020-08-18 NOTE — PROGRESS NOTE ADULT - SUBJECTIVE AND OBJECTIVE BOX
[facility] DIVISION OF KIDNEY DISEASES AND HYPERTENSION -- HEMODIALYSIS NOTE  --------------------------------------------------------------------------------  Chief Complaint: ESRD/Ongoing hemodialysis requirement    24 hour events/subjective:        PAST HISTORY  --------------------------------------------------------------------------------  No significant changes to PMH, PSH, FHx, SHx, unless otherwise noted    ALLERGIES & MEDICATIONS  --------------------------------------------------------------------------------  Allergies    Plavix (Hives)  Toprol-XL (Rash)    Intolerances      Standing Inpatient Medications  aspirin enteric coated 81 milliGRAM(s) Oral daily  atorvastatin 80 milliGRAM(s) Oral at bedtime  dextrose 5%. 1000 milliLiter(s) IV Continuous <Continuous>  epoetin juan-epbx (RETACRIT) Injectable 70063 Unit(s) SubCutaneous <User Schedule>  hydrALAZINE 50 milliGRAM(s) Oral two times a day  isosorbide   mononitrate ER Tablet (IMDUR) 30 milliGRAM(s) Oral daily  multivitamin 1 Tablet(s) Oral daily  NIFEdipine XL 60 milliGRAM(s) Oral at bedtime  NIFEdipine XL 90 milliGRAM(s) Oral daily  sertraline 25 milliGRAM(s) Oral at bedtime  torsemide 20 milliGRAM(s) Oral <User Schedule>    PRN Inpatient Medications      REVIEW OF SYSTEMS  --------------------------------------------------------------------------------  Gen: No weight changes, fatigue, fevers/chills, weakness  Skin: No rashes  Head/Eyes/Ears/Mouth: No headache; Normal hearing; Normal vision w/o blurriness; No sinus pain/discomfort, sore throat  Respiratory: No dyspnea, cough, wheezing, hemoptysis  CV: No chest pain, PND, orthopnea  GI: No abdominal pain, diarrhea, constipation, nausea, vomiting, melena, hematochezia  : No increased frequency, dysuria, hematuria, nocturia  MSK: No joint pain/swelling; no back pain; no edema  Neuro: No dizziness/lightheadedness, weakness, seizures, numbness, tingling  Heme: No easy bruising or bleeding  Endo: No heat/cold intolerance  Psych: No significant nervousness, anxiety, stress, depression    All other systems were reviewed and are negative, except as noted.    VITALS/PHYSICAL EXAM  --------------------------------------------------------------------------------  T(C): 36.6 (08-18-20 @ 11:09), Max: 36.9 (08-17-20 @ 14:31)  HR: 81 (08-18-20 @ 11:09) (72 - 108)  BP: 124/54 (08-18-20 @ 11:09) (90/42 - 159/85)  RR: 18 (08-18-20 @ 11:09) (16 - 18)  SpO2: 97% (08-18-20 @ 11:09) (96% - 99%)  Wt(kg): --        08-17-20 @ 07:01  -  08-18-20 @ 07:00  --------------------------------------------------------  IN: 200 mL / OUT: 2125 mL / NET: -1925 mL      Physical Exam:  	Gen: NAD, well-appearing, Pale,   	HEENT: PERRL, supple neck, Carotid Bruits,   	Pulm: CTA B/L  	CV: RRR, S1S2; no rub, DEANA,   	Back: No spinal or CVA tenderness; no sacral edema  	Abd: +BS, soft, nontender/nondistended  	: No suprapubic tenderness  	UE: Warm, FROM, no clubbing, intact strength; no edema; no asterixis  	LE: Warm, FROM, no clubbing, intact strength; + edema  	Neuro: No focal deficits, intact gait  	Psych: Normal affect and mood  	Skin: Warm, without rashes  	Vascular access: AVF,     LABS/STUDIES  --------------------------------------------------------------------------------              9.2    8.79  >-----------<  227      [08-18-20 @ 06:56]              28.0     140  |  97  |  32.0  ----------------------------<  118      [08-18-20 @ 06:56]  3.5   |  28.0  |  3.18        Ca     9.6     [08-18-20 @ 06:56]    Iron 38, TIBC 289, %sat 13      [06-10-20 @ 06:29]  Ferritin 83      [06-10-20 @ 06:29]  HbA1c 4.9      [08-09-18 @ 05:54]

## 2020-08-18 NOTE — PROGRESS NOTE ADULT - ASSESSMENT
ECHO:    Summary:     1. Severely enlarged left atrium.   2. There is moderate concentric left ventricular hypertrophy.   3. Left ventricular ejection fraction, by visual estimation, is 60 to 65%. Grade III diastolic dysfunction.   4. Elevated mean left atrial pressure. (LAP = 41 mm Hg)   5. Normal right atrial size.   6. Normal right ventricular size and function.   7. Mild mitral valve regurgitation.   8. Moderate to severe aortic valve stenosis.   9. There is no evidence of pericardial effusion.    Cici Krishnamurthy MD, RPVI Electronically signed on 8/16/2020 at 3:29:26 PM    CARDIAC CATHETERIZATION:  VENTRICLES: No left ventriculogram was performed.  CORONARY VESSELS: The coronary circulation is co-dominant.  LM:   --  Ostial LM: There was a 30 % stenosis.  LAD:   --  Mid LAD: There was a 50 % stenosis.  --  Distal LAD: There was a 60 % stenosis.  CX:   --  Proximal circumflex: There was a 20 % stenosis.  RCA:   --  Mid RCA: There was a 70 % stenosis. Superior takeoff.  COMPLICATIONS: No complications occurred during the cath lab visit.    DIAGNOSTIC IMPRESSIONS: Severe RCA disease but co-dominant vessel, does not supply large territory.  Moderate LAD disease- similar to angiogram from 2017.  Peak to peak gradient  of 55-60 mmHg- consistent with severe AS.  LVEDP 14 mmHg.    DIAGNOSTIC RECOMMENDATIONS: Medical management of CAD.  TAVR evaluation.     (Plavix allergy- may have to consider using Effient as DAPT post TAVR)  HEMODYNAMIC TABLES  Pressures:  Baseline      In summary, CHRISTIANO ELIZABETH is an 86y Male with past medical history significant for HTN ,  non - obstructive CAD HFpEF ESRD on HD , severe AS normal EF s/p Right and Left cath see above     Plan  1.  TAVR ,  this Friday   2.  HD On Thursday, Maintain Optimal Volume & Metabolic [place],   3.  Continue current  medical therapy

## 2020-08-18 NOTE — CHART NOTE - NSCHARTNOTEFT_GEN_A_CORE
Cardiology cath NP f/u site check: followed by [facility]  s/p LHC: 50% mLAD; 70% codominant pRCA; severe AS  denies complaints of chest pain/sob/dizziness/palps  daughter at bedside  Right radial site no hematoma , dressing removed good cap refill  + pulses, color good  For TAVR as per CTS

## 2020-08-18 NOTE — PROGRESS NOTE ADULT - ASSESSMENT
86yoM hx ESRD on HD on M/F only, HFpEF, HTN, AS, prior GI bleed from ulcerated polyps and colonic AVMs presenting with worsening weakness fatigue after missing HD session with epogen injection once this week found to have worsening anemia on outpatient labs, also with worsening AS     #Acute on chronic anemia in setting of ESRD  - No recent or active bleeding likely related to renal insufficiency and missed epogen injection  - baseline Hgb appears to be 8-10  - s/p prbc x 2   - monitor h and h   - Nephrology consult appreciated     #ESRD on HD   - nephrology consult appreciated   - HD per nephro    #Elevated troponin   - Chronically elevated sine 6/2020, likely due to ESRD    #AS  - Pt with chronic dyspnea, deemed secondary to worsening AS  - CT surgery consult appreciated  - cardio consult appreciated     #HFpEF with mild acute decompensation   - CXR with mild central congestion, but improvement from 6/23 study  - PO torsemide on non-HD days   - Cardiology consult appreciated    #CAD  - ASA, statin     #HTN- Essential  - Hydralazine, nifedipine, imdur resumed  - monitor blood pressure     #DVT prophylaxis  - venodynes  - no chemical a/c given hx of gi bleed from polyps, avms, recent fobt+    patient hospital course to date d/w patient daughter Vanessa all questions answered. Probable TAVR Friday

## 2020-08-18 NOTE — PROGRESS NOTE ADULT - SUBJECTIVE AND OBJECTIVE BOX
Subjective: "I feel ok" NAD denies CP, SOB.     VITAL SIGNS  Vital Signs Last 24 Hrs  T(C): 36.3 (20 @ 15:55), Max: 36.9 (20 @ 10:02)  T(F): 97.4 (20 @ 15:55), Max: 98.5 (20 @ 10:02)  HR: 77 (20 @ 17:01) (72 - 91)  BP: 137/64 (20 @ 17:01) (123/55 - 158/56)  RR: 18 (20 @ 15:55) (16 - 18)  SpO2: 99% (20 @ 15:55) (96% - 99%)  on RA            MEDICATIONS  aspirin enteric coated 81 milliGRAM(s) Oral daily  atorvastatin 80 milliGRAM(s) Oral at bedtime  dextrose 5%. 1000 milliLiter(s) IV Continuous <Continuous>  epoetin juan-epbx (RETACRIT) Injectable 81873 Unit(s) SubCutaneous <User Schedule>  hydrALAZINE 50 milliGRAM(s) Oral two times a day  isosorbide   mononitrate ER Tablet (IMDUR) 30 milliGRAM(s) Oral daily  multivitamin 1 Tablet(s) Oral daily  NIFEdipine XL 60 milliGRAM(s) Oral at bedtime  NIFEdipine XL 90 milliGRAM(s) Oral daily  sertraline 25 milliGRAM(s) Oral at bedtime  torsemide 20 milliGRAM(s) Oral <User Schedule>      PHYSICAL EXAM  General: well nourished, well developed, no acute distress  Neurology: alert and oriented x 3, nonfocal, no gross deficits  Respiratory: clear to auscultation bilaterally  CV: regular rate and rhythm, normal S1, S2 + harsh systolic murmur  Abdomen: soft, nontender, nondistended, positive bowel sounds, last bowel movement   Extremities: warm, well perfused. no edema. + DP pulses        08- @ 07:01  -   @ 07:00  --------------------------------------------------------  IN: 200 mL / OUT: 2125 mL / NET: -1925 mL    08 @ 07:01  -   @ 17:59  --------------------------------------------------------  IN: 800 mL / OUT: 1300 mL / NET: -500 mL        Weights:  Daily     Daily Weight in k.5 (18 Aug 2020 11:09)  Admit Wt: Drug Dosing Weight  Height (cm): 165.1 (14 Aug 2020 18:15)  Weight (kg): 68 (14 Aug 2020 18:15)  BMI (kg/m2): 24.9 (14 Aug 2020 18:15)  BSA (m2): 1.75 (14 Aug 2020 18:15)    All laboratory results, radiology and medications reviewed.    LABS      140  |  97<L>  |  32.0<H>  ----------------------------<  118<H>  3.5   |  28.0  |  3.18<H>    Ca    9.6      18 Aug 2020 06:56                                   9.2    8.79  )-----------( 227      ( 18 Aug 2020 06:56 )             28.0              CAPILLARY BLOOD GLUCOSE    < from: CT Angio Chest w/ IV Cont (20 @ 11:21) >  NON-VASCULAR FINDINGS:    Thyroid gland: unremarkable    Axillary lymph nodes: No significant.    Mediastinal lymph nodes: No significant.    Hilar Adenopathy: Nosignificant.    Heart: Enlarged heart. Aortic valve thickening calcified. Mitral valve anulus calcified.    Coronary artery calcifications: Triple-vessel coronary calcifications.    Pericardial effusion: No significant.    Lungs: Small bilateral effusions. Tracheobronchial tree patent.        Liver: normal in size and configuration without focal mass.    Gallbladder: Gallstones.    Spleen:   normal in size.    Pancreas:   unremarkable.    Adrenal: No masses are seen.    Kidneys: . Bilateral cortical thinning. Bilateral renal cysts. Low-density lesions are too small to characterize.    No paraaortic or pelvic adenopathy.    Small bowel: No obstruction.    Colon: No wall thickening or pericolonic inflammatory changes.    Appendix:   within normal limits    Urinary bladder: unremarkable.        Osseous structures: Degenerative changes with areas of patchy osteopenia.    < end of copied text >           PAST MEDICAL & SURGICAL HISTORY:  GI bleeding: due to ulcerated polyps and colonic AVMs  Aortic stenosis  ESRD on dialysis: HD on  and   Risk factors for obstructive sleep apnea  Anemia  RICHARDS (dyspnea on exertion)  VT (ventricular tachycardia)  HTN (hypertension)  CAD (coronary artery disease)  Arrhythmia  AV block, 1st degree  DM (diabetes mellitus)  H/O carotid endarterectomy: Right  A-V fistula: left arm 2017  H/O angioplasty: ,  no  intervention  H/O left knee surgery  H/O circumcision: at  age  65

## 2020-08-19 LAB
24R-OH-CALCIDIOL SERPL-MCNC: 26.4 NG/ML — LOW (ref 30–80)
ALBUMIN SERPL ELPH-MCNC: 3.6 G/DL — SIGNIFICANT CHANGE UP (ref 3.3–5.2)
ANION GAP SERPL CALC-SCNC: 13 MMOL/L — SIGNIFICANT CHANGE UP (ref 5–17)
BUN SERPL-MCNC: 18 MG/DL — SIGNIFICANT CHANGE UP (ref 8–20)
CALCIUM SERPL-MCNC: 9.6 MG/DL — SIGNIFICANT CHANGE UP (ref 8.4–10.5)
CALCIUM SERPL-MCNC: 9.6 MG/DL — SIGNIFICANT CHANGE UP (ref 8.6–10.2)
CHLORIDE SERPL-SCNC: 99 MMOL/L — SIGNIFICANT CHANGE UP (ref 98–107)
CO2 SERPL-SCNC: 29 MMOL/L — SIGNIFICANT CHANGE UP (ref 22–29)
CREAT SERPL-MCNC: 2.79 MG/DL — HIGH (ref 0.5–1.3)
FERRITIN SERPL-MCNC: 184 NG/ML — SIGNIFICANT CHANGE UP (ref 30–400)
GLUCOSE SERPL-MCNC: 123 MG/DL — HIGH (ref 70–99)
IRON SATN MFR SERPL: 20 % — SIGNIFICANT CHANGE UP (ref 16–55)
IRON SATN MFR SERPL: 53 UG/DL — LOW (ref 59–158)
NT-PROBNP SERPL-SCNC: HIGH PG/ML (ref 0–300)
PHOSPHATE SERPL-MCNC: 2.8 MG/DL — SIGNIFICANT CHANGE UP (ref 2.4–4.7)
POTASSIUM SERPL-MCNC: 3.5 MMOL/L — SIGNIFICANT CHANGE UP (ref 3.5–5.3)
POTASSIUM SERPL-SCNC: 3.5 MMOL/L — SIGNIFICANT CHANGE UP (ref 3.5–5.3)
PTH-INTACT FLD-MCNC: 91 PG/ML — HIGH (ref 15–65)
SODIUM SERPL-SCNC: 141 MMOL/L — SIGNIFICANT CHANGE UP (ref 135–145)
TIBC SERPL-MCNC: 266 UG/DL — SIGNIFICANT CHANGE UP (ref 220–430)
TRANSFERRIN SERPL-MCNC: 186 MG/DL — SIGNIFICANT CHANGE UP (ref 180–329)

## 2020-08-19 PROCEDURE — 99232 SBSQ HOSP IP/OBS MODERATE 35: CPT

## 2020-08-19 PROCEDURE — 99233 SBSQ HOSP IP/OBS HIGH 50: CPT

## 2020-08-19 RX ORDER — ASCORBIC ACID 60 MG
500 TABLET,CHEWABLE ORAL DAILY
Refills: 0 | Status: DISCONTINUED | OUTPATIENT
Start: 2020-08-19 | End: 2020-08-21

## 2020-08-19 RX ADMIN — Medication 90 MILLIGRAM(S): at 05:41

## 2020-08-19 RX ADMIN — SERTRALINE 25 MILLIGRAM(S): 25 TABLET, FILM COATED ORAL at 20:00

## 2020-08-19 RX ADMIN — CHLORHEXIDINE GLUCONATE 15 MILLILITER(S): 213 SOLUTION TOPICAL at 05:41

## 2020-08-19 RX ADMIN — Medication 60 MILLIGRAM(S): at 20:00

## 2020-08-19 RX ADMIN — Medication 50 MILLIGRAM(S): at 05:41

## 2020-08-19 RX ADMIN — Medication 81 MILLIGRAM(S): at 09:15

## 2020-08-19 RX ADMIN — CHLORHEXIDINE GLUCONATE 15 MILLILITER(S): 213 SOLUTION TOPICAL at 17:13

## 2020-08-19 RX ADMIN — CHLORHEXIDINE GLUCONATE 1 APPLICATION(S): 213 SOLUTION TOPICAL at 09:16

## 2020-08-19 RX ADMIN — ISOSORBIDE MONONITRATE 30 MILLIGRAM(S): 60 TABLET, EXTENDED RELEASE ORAL at 09:15

## 2020-08-19 RX ADMIN — Medication 20 MILLIGRAM(S): at 09:15

## 2020-08-19 RX ADMIN — Medication 50 MILLIGRAM(S): at 17:13

## 2020-08-19 RX ADMIN — Medication 1 TABLET(S): at 09:15

## 2020-08-19 RX ADMIN — Medication 0.5 MILLIGRAM(S): at 19:44

## 2020-08-19 NOTE — PROGRESS NOTE ADULT - SUBJECTIVE AND OBJECTIVE BOX
[place] [facility], THE HEART CENTER                                   [address] [place] [address]                                                      PHONE: [phone]                                                         FAX: [phone]  [email or web address]  ---------------------------------------------------------------------------------------------------------------------------------    Overnight events/patient complaints:  NAD feeling well today     Plavix (Hives)  Toprol-XL (Rash)    MEDICATIONS  (STANDING):  aspirin enteric coated 81 milliGRAM(s) Oral daily  atorvastatin 80 milliGRAM(s) Oral at bedtime  chlorhexidine 0.12% Liquid 15 milliLiter(s) Oral Mucosa two times a day  chlorhexidine 4% Liquid 1 Application(s) Topical two times a day  dextrose 5%. 1000 milliLiter(s) (30 mL/Hr) IV Continuous <Continuous>  epoetin juan-epbx (RETACRIT) Injectable 02213 Unit(s) SubCutaneous <User Schedule>  hydrALAZINE 50 milliGRAM(s) Oral two times a day  isosorbide   mononitrate ER Tablet (IMDUR) 30 milliGRAM(s) Oral daily  multivitamin 1 Tablet(s) Oral daily  NIFEdipine XL 60 milliGRAM(s) Oral at bedtime  NIFEdipine XL 90 milliGRAM(s) Oral daily  sertraline 25 milliGRAM(s) Oral at bedtime  torsemide 20 milliGRAM(s) Oral <User Schedule>    MEDICATIONS  (PRN):      Vital Signs Last 24 Hrs  T(C): 36.6 (19 Aug 2020 09:12), Max: 36.9 (18 Aug 2020 10:02)  T(F): 97.9 (19 Aug 2020 09:12), Max: 98.5 (18 Aug 2020 10:02)  HR: 82 (19 Aug 2020 09:12) (66 - 91)  BP: 129/61 (19 Aug 2020 09:12) (124/54 - 158/56)  BP(mean): --  RR: 18 (19 Aug 2020 09:12) (17 - 18)  SpO2: 96% (19 Aug 2020 09:12) (95% - 99%)  ICU Vital Signs Last 24 Hrs  CHRISTIANO ELIZABETH  I&O's Detail    18 Aug 2020 07:01  -  19 Aug 2020 07:00  --------------------------------------------------------  IN:    Other: 800 mL  Total IN: 800 mL    OUT:    Other: 1300 mL  Total OUT: 1300 mL    Total NET: -500 mL        I&O's Summary    18 Aug 2020 07:01  -  19 Aug 2020 07:00  --------------------------------------------------------  IN: 800 mL / OUT: 1300 mL / NET: -500 mL      Drug Dosing Weight  CHRISTIANO ELIZABETH      PHYSICAL EXAM:  General: Appears well developed, well nourished alert and cooperative.  HEENT: Head; normocephalic, atraumatic.  Eyes: Pupils reactive, cornea wnl.  Neck: Supple, no nodes adenopathy, no NVD or carotid bruit or thyromegaly.  CARDIOVASCULAR: Normal S1 and S2, 3/6 murmur, rub, gallop or lift.   LUNGS: Decrease BS B/L   ABDOMEN: Soft, nontender without mass or organomegaly. bowel sounds normoactive.  EXTREMITIES: No clubbing, cyanosis or edema. Distal pulses wnl.   SKIN: warm and dry with normal turgor.  NEURO: Alert/oriented x 3/normal motor exam. No pathologic reflexes.    PSYCH: normal affect.        LABS:                        9.2    8.79  )-----------( 227      ( 18 Aug 2020 06:56 )             28.0     08-19    141  |  99  |  18.0  ----------------------------<  123<H>  3.5   |  29.0  |  2.79<H>    Ca    9.6      19 Aug 2020 06:32  Phos  2.8     08-19    TPro  x   /  Alb  3.6  /  TBili  x   /  DBili  x   /  AST  x   /  ALT  x   /  AlkPhos  x   08-19    CHRISTIANO ELIZABETH            RADIOLOGY & ADDITIONAL STUDIES:    INTERPRETATION OF TELEMETRY (personally reviewed):      ECHO:  Summary:   1. Severely enlarged left atrium.   2. There is moderate concentric left ventricular hypertrophy.   3. Left ventricular ejection fraction, by visual estimation, is 60 to 65%. Grade III diastolic dysfunction.   4. Elevated mean left atrial pressure. (LAP = 41 mm Hg)   5. Normal right atrial size.   6. Normal right ventricular size and function.   7. Mild mitral valve regurgitation.   8. Moderate to severe aortic valve stenosis.   9. There is no evidence of pericardial effusion.    MD Rohith, RPVI Electronically signed on 8/16/2020 at 3:29:26 PM        CARDIAC CATHETERIZATION:  VENTRICLES: No left ventriculogram was performed.  CORONARY VESSELS: The coronary circulation is co-dominant.  LM:   --  Ostial LM: There was a 30 % stenosis.  LAD:   --  Mid LAD: There was a 50 % stenosis.  --  Distal LAD: There was a 60 % stenosis.  CX:   --  Proximal circumflex: There was a 20 % stenosis.  RCA:   --  Mid RCA: There was a 70 % stenosis. Superior takeoff.  COMPLICATIONS: No complications occurred during the cath lab visit.  DIAGNOSTIC IMPRESSIONS: Severe RCA disease but co-dominant vessel, does not  supply large territory.  Moderate LAD disease- similar to angiogram from 2017.  Peak to peak gardient of 55-60 mmHg- consistent with severe AS.  LVEDP 14 mmHg.  DIAGNOSTIC RECOMMENDATIONS: Medical management of CAD.  TAVR evaluation. (Plavix allergy- may have to consider using effient as  DAPT post TAVR)  Prepared and signed by  Raymond Mariscal MD  Signed 08/17/2020 19:11:56  HEMODYNAMIC TABLES  Pressures:  Baseline      ASSESSMENT AND PLAN:  In summary, CHRISTIANO ELIZABETH is an 86y Male with past medical history significant for HTN HLD none obstructive CAD HFpEF ESRD on HD severe AS normal EF s/p Right and Left cath see above; doing well and HD stable      Plan  1.  TAVR likely this Friday   2.  HD as per renal   3.  Continue current optimal medical therapy

## 2020-08-19 NOTE — DIETITIAN INITIAL EVALUATION ADULT. - ETIOLOGY
related to inability to meet sufficient protein-energy in setting of ESRD on HD, advanced age, AS with plan for TAVR

## 2020-08-19 NOTE — DIETITIAN INITIAL EVALUATION ADULT. - ADD RECOMMEND
Change MVI to Nephro-Thomas; add vit C 500mg daily. Encourage po intake, monitor diet tolerance, and provide assistance at meals as needed. Obtain daily weights to monitor trends.

## 2020-08-19 NOTE — DIETITIAN INITIAL EVALUATION ADULT. - PERTINENT LABORATORY DATA
08-19 Na141 mmol/L Glu 123 mg/dL<H> K+ 3.5 mmol/L Cr  2.79 mg/dL<H> BUN 18.0 mg/dL Phos 2.8 mg/dL Alb 3.6 g/dL PAB n/a

## 2020-08-19 NOTE — PROGRESS NOTE ADULT - SUBJECTIVE AND OBJECTIVE BOX
Subjective:  Pt in  chair NAD.  No issues overnight.  Plan for TAVR Friday     VITAL SIGNS  T(C): 36.6 (20 @ 09:12), Max: 36.8 (20 @ 14:34)  HR: 82 (20 @ 09:12) (66 - 84)  BP: 129/61 (20 @ 09:12) (124/54 - 157/65)  RR: 18 (20 @ 09:12) (18 - 18)  SpO2: 96% (20 @ 09:12) (95% - 99%) RA         Daily     Daily Weight in k.5 (18 Aug 2020 11:09)  Admit Wt: Drug Dosing Weight  Height (cm): 165.1 (14 Aug 2020 18:15)  Weight (kg): 68 (14 Aug 2020 18:15)    Telemetry: SR 1st degree  LVEF:  NML    MEDICATIONS  aspirin enteric coated 81 milliGRAM(s) Oral daily  atorvastatin 80 milliGRAM(s) Oral at bedtime  chlorhexidine 0.12% Liquid 15 milliLiter(s) Oral Mucosa two times a day  chlorhexidine 4% Liquid 1 Application(s) Topical two times a day  dextrose 5%. 1000 milliLiter(s) IV Continuous <Continuous>  epoetin juan-epbx (RETACRIT) Injectable 78351 Unit(s) SubCutaneous <User Schedule>  hydrALAZINE 50 milliGRAM(s) Oral two times a day  isosorbide   mononitrate ER Tablet (IMDUR) 30 milliGRAM(s) Oral daily  multivitamin 1 Tablet(s) Oral daily  NIFEdipine XL 60 milliGRAM(s) Oral at bedtime  NIFEdipine XL 90 milliGRAM(s) Oral daily  sertraline 25 milliGRAM(s) Oral at bedtime  torsemide 20 milliGRAM(s) Oral <User Schedule>    MEDICATIONS  (PRN):        PHYSICAL EXAM  General: Alert Awake NAD  Respiratory:  decreased at bases , clear   CV: RRR S1 S2 + DEANA   Abdomen:  soft NT ND + BS   Extremities: trace edema b/l LE         I&O's Detail    18 Aug 2020 07:01  -  19 Aug 2020 07:00  --------------------------------------------------------  IN:    Other: 800 mL  Total IN: 800 mL    OUT:    Other: 1300 mL  Total OUT: 1300 mL    Total NET: -500 mL          LABS      141  |  99  |  18.0  ----------------------------<  123<H>  3.5   |  29.0  |  2.79<H>    Ca    9.6      19 Aug 2020 06:32  Phos  2.8         TPro  x   /  Alb  3.6  /  TBili  x   /  DBili  x   /  AST  x   /  ALT  x   /  AlkPhos  x                                    9.2    8.79  )-----------( 227      ( 18 Aug 2020 06:56 )             28.0                LIVER FUNCTIONS - ( 19 Aug 2020 06:32 )  Alb: 3.6 g/dL / Pro: x     / ALK PHOS: x     / ALT: x     / AST: x     / GGT: x              CAPILLARY BLOOD GLUCOSE               PAST MEDICAL & SURGICAL HISTORY:  GI bleeding: due to ulcerated polyps and colonic AVMs  Aortic stenosis  ESRD on dialysis: HD on  and   Risk factors for obstructive sleep apnea  Anemia  RICHARDS (dyspnea on exertion)  VT (ventricular tachycardia)  HTN (hypertension)  CAD (coronary artery disease)  Arrhythmia  AV block, 1st degree  DM (diabetes mellitus)  H/O carotid endarterectomy: Right  A-V fistula: left arm 2017  H/O angioplasty: ,  no  intervention  H/O left knee surgery  H/O circumcision: at  age  65

## 2020-08-19 NOTE — DIETITIAN INITIAL EVALUATION ADULT. - OTHER INFO
86 year old male hx ESRD on HD, HFpEF, HTN, AS, prior GI bleed from ulcerated polyps and colonic AVMs presenting with worsening weakness fatigue after missing HD session with epogen injection once this week found to have worsening anemia on outpatient labs, also with worsening AS. Plan for TAVR Friday. Spoke with pt and pts daughter at bedside, reports fair appetite/po intake PTA and currently. Daughter states pt has been doing well following a dialysis diet and making sure pt consumes increased protein and limits certain foods. Per EMR review, pts weight on 6/2020 was 156 lbs, current admission weight 150 lbs- pt denies any significant weight changes; weight loss likely related to fluid as pt receives HD. Assisted pt with menu selections.

## 2020-08-19 NOTE — DIETITIAN INITIAL EVALUATION ADULT. - CONTINUE CURRENT NUTRITION CARE PLAN
-- add Nepro TID to optimize po intake and provide an additional 425 kcal, 19.1g protein per serving./yes

## 2020-08-19 NOTE — CHART NOTE - NSCHARTNOTEFT_GEN_A_CORE
called by rn for patient with nausea    ativan 0.5mg x1 given as patient a cardiac patient to help nausea  continue to monitor patient

## 2020-08-19 NOTE — DIETITIAN INITIAL EVALUATION ADULT. - MALNUTRITION
NFPE: moderate muscle loss of temples, clavicles, shoulders; moderate fat loss of orbitals, triceps moderate, chronic

## 2020-08-19 NOTE — DIETITIAN INITIAL EVALUATION ADULT. - PERTINENT MEDS FT
MEDICATIONS  (STANDING):  aspirin enteric coated 81 milliGRAM(s) Oral daily  atorvastatin 80 milliGRAM(s) Oral at bedtime  chlorhexidine 0.12% Liquid 15 milliLiter(s) Oral Mucosa two times a day  chlorhexidine 4% Liquid 1 Application(s) Topical two times a day  dextrose 5%. 1000 milliLiter(s) (30 mL/Hr) IV Continuous <Continuous>  epoetin juan-epbx (RETACRIT) Injectable 98337 Unit(s) SubCutaneous <User Schedule>  hydrALAZINE 50 milliGRAM(s) Oral two times a day  isosorbide   mononitrate ER Tablet (IMDUR) 30 milliGRAM(s) Oral daily  multivitamin 1 Tablet(s) Oral daily  NIFEdipine XL 60 milliGRAM(s) Oral at bedtime  NIFEdipine XL 90 milliGRAM(s) Oral daily  sertraline 25 milliGRAM(s) Oral at bedtime  torsemide 20 milliGRAM(s) Oral <User Schedule>    MEDICATIONS  (PRN):

## 2020-08-19 NOTE — PROGRESS NOTE ADULT - SUBJECTIVE AND OBJECTIVE BOX
Patient is a 86y old  Male who presents with a chief complaint of acute on chronic anemia, worsening AS (19 Aug 2020 11:01)    Patient seen and examined at bedside.     ALLERGIES:  Plavix (Hives)  Toprol-XL (Rash)    MEDICATIONS  (STANDING):  aspirin enteric coated 81 milliGRAM(s) Oral daily  atorvastatin 80 milliGRAM(s) Oral at bedtime  chlorhexidine 0.12% Liquid 15 milliLiter(s) Oral Mucosa two times a day  chlorhexidine 4% Liquid 1 Application(s) Topical two times a day  dextrose 5%. 1000 milliLiter(s) (30 mL/Hr) IV Continuous <Continuous>  epoetin juan-epbx (RETACRIT) Injectable 79942 Unit(s) SubCutaneous <User Schedule>  hydrALAZINE 50 milliGRAM(s) Oral two times a day  isosorbide   mononitrate ER Tablet (IMDUR) 30 milliGRAM(s) Oral daily  multivitamin 1 Tablet(s) Oral daily  NIFEdipine XL 60 milliGRAM(s) Oral at bedtime  NIFEdipine XL 90 milliGRAM(s) Oral daily  sertraline 25 milliGRAM(s) Oral at bedtime  torsemide 20 milliGRAM(s) Oral <User Schedule>    MEDICATIONS  (PRN):    Vital Signs Last 24 Hrs  T(F): 97.9 (19 Aug 2020 09:12), Max: 98.3 (18 Aug 2020 14:34)  HR: 82 (19 Aug 2020 09:12) (66 - 84)  BP: 129/61 (19 Aug 2020 09:12) (129/61 - 157/65)  RR: 18 (19 Aug 2020 09:12) (18 - 18)  SpO2: 96% (19 Aug 2020 09:12) (95% - 99%)  I&O's Summary    18 Aug 2020 07:01  -  19 Aug 2020 07:00  --------------------------------------------------------  IN: 800 mL / OUT: 1300 mL / NET: -500 mL    PHYSICAL EXAM:  General: NAD, alert  ENT: MMM, no thrush  Neck: Supple, No JVD  Lungs: +crackles  Cardio:+s1/s2 +trace edema b/l le  Abdomen: Soft, Nontender, Nondistended; Bowel sounds present  Extremities: No calf tenderness    LABS:                        9.2    8.79  )-----------( 227      ( 18 Aug 2020 06:56 )             28.0     08-19    141  |  99  |  18.0  ----------------------------<  123  3.5   |  29.0  |  2.79    Ca    9.6      19 Aug 2020 06:32  Phos  2.8     08-19    TPro  x   /  Alb  3.6  /  TBili  x   /  DBili  x   /  AST  x   /  ALT  x   /  AlkPhos  x   08-19    eGFR if Non African American: 20 mL/min/1.73M2 (08-19-20 @ 06:32)  eGFR if African American: 23 mL/min/1.73M2 (08-19-20 @ 06:32)    RADIOLOGY & ADDITIONAL TESTS:  < from: Xray Chest 1 View- PORTABLE-Urgent (08.14.20 @ 19:25) >  INTERPRETATION:  AP chest on August 14, 2020 at 7:14 PM. Patient has weakness patient has renal insufficiency, hypertension, and diabetes. There is history of CHF and anemia of chronic renal disease. Patient has blood loss anemia and is being considered for transfusion. Patient is on dialysis.  Heart is magnified by technique. Loop recorder over the left chest again noted.  The mild central congestive findings are again seen but there is improvement from June 23 in this regard.  < end of copied text >    < from: TTE Echo Complete w/o Contrast w/ Doppler (08.16.20 @ 12:39) >  Summary:   1. Severely enlarged left atrium.   2. There is moderate concentric left ventricular hypertrophy.   3. Left ventricular ejection fraction, by visual estimation, is 60 to 65%. Grade III diastolic dysfunction.   4. Elevated mean left atrial pressure. (LAP = 41 mm Hg)   5. Normal right atrial size.   6. Normal right ventricular size and function.   7. Mild mitral valve regurgitation.   8. Moderate to severe aortic valve stenosis.   9. There is no evidence of pericardial effusion.  < end of copied text >    Care Discussed with Consultants/Other Providers:   CT Surgery

## 2020-08-19 NOTE — CHART NOTE - NSCHARTNOTEFT_GEN_A_CORE
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        [ ] Severe Protein Calorie Malnutrition        [ ] Unspecified Protein Calorie Malnutrition        [ ] Underweight / BMI <19        [ ] Morbid Obesity / BMI > 40    Pt presents at high nutrition risk secondary to malnutrition (moderate, chronic) related to inability to meet sufficient protein-energy in setting of ESRD on HD, advanced age, AS with plan for TAVR as evidenced by meeting <75% nutrient needs >1 month, moderate muscle loss of temples, clavicles, shoulders; moderate fat loss of orbitals, triceps.     Findings as based on:  •  Comprehensive nutrition assessment and consultation  •  Calorie counts (nutrient intake analysis)  •  Food acceptance and intake status from observations by staff  •  Follow up  •  Patient education  •  Intervention secondary to interdisciplinary rounds  •   concerns      Treatment:    The following has been recommended:  1) Continue diet as tolerated.  2) Add Nepro TID to optimize po intake and provide an additional 425 kcal, 19.1g protein per serving.  3) Change MVI to Nephro-Thomas; add vit C 500mg daily.   4) Encourage po intake, monitor diet tolerance, and provide assistance at meals as needed.   5) Obtain daily weights to monitor trends.      PROVIDER Section:     By signing this assessment you are acknowledging and agree with the diagnosis/diagnoses assigned by the Registered Dietitian    Comments:

## 2020-08-19 NOTE — PROGRESS NOTE ADULT - ASSESSMENT
86yoM hx ESRD on HD on M/F only, HFpEF, HTN, AS, prior GI bleed from ulcerated polyps and colonic AVMs presenting with worsening weakness fatigue after missing HD session with epogen injection once this week found to have worsening anemia on outpatient labs, also with worsening AS     #Acute on chronic anemia in setting of ESRD  - No recent or active bleeding likely related to renal insufficiency and missed epogen injection  - baseline Hgb appears to be 8-10  - s/p prbc x 2   - monitor h and h   - Nephrology consult appreciated     #ESRD on HD   - nephrology consult appreciated   - HD per nephro    #Elevated troponin   - Chronically elevated sine 6/2020, likely due to ESRD    #AS  - Pt with chronic dyspnea, deemed secondary to worsening AS  - CT surgery consult appreciated  - cardio consult appreciated     #HFpEF with mild acute decompensation   - CXR with mild central congestion, but improvement from 6/23 study  - PO torsemide on non-HD days   - Cardiology consult appreciated    #CAD  - ASA, statin     #HTN- Essential  - Hydralazine, nifedipine, imdur resumed  - monitor blood pressure     #DVT prophylaxis  - venodynes  - no chemical a/c given hx of gi bleed from polyps, avms, recent fobt+    patient hospital course to date d/w patient daughter Vanessa all questions answered. Probable TAVR Friday 86yoM hx ESRD on HD on M/F only, HFpEF, HTN, AS, prior GI bleed from ulcerated polyps and colonic AVMs presenting with worsening weakness fatigue after missing HD session with epogen injection once this week found to have worsening anemia on outpatient labs, also with worsening AS     #Acute on chronic anemia in setting of ESRD  - No recent or active bleeding likely related to renal insufficiency and missed epogen injection  - baseline Hgb appears to be 8-10  - s/p prbc x 2   - monitor h and h   - Nephrology consult appreciated     #ESRD on HD   - nephrology consult appreciated   - HD per nephro    #Elevated troponin   - Chronically elevated sine 6/2020, likely due to ESRD    #AS  - Pt with chronic dyspnea, deemed secondary to worsening AS  - CT surgery consult appreciated  - cardio consult appreciated     #HFpEF with mild acute decompensation   - CXR with mild central congestion, but improvement from 6/23 study  - PO torsemide on non-HD days   - Cardiology consult appreciated    #CAD  - ASA, statin     #HTN- Essential  - Hydralazine, nifedipine, imdur resumed  - monitor blood pressure     #moderate protein calorie malnutrtion  - nephro  - nutritionist consult appreciated    #DVT prophylaxis  - venodynes  - no chemical a/c given hx of gi bleed from polyps, avms, recent fobt+    patient hospital course to date d/w patient daughter Vanessa all questions answered. Probable TAVR Friday

## 2020-08-20 DIAGNOSIS — N18.6 END STAGE RENAL DISEASE: ICD-10-CM

## 2020-08-20 DIAGNOSIS — E78.5 HYPERLIPIDEMIA, UNSPECIFIED: ICD-10-CM

## 2020-08-20 DIAGNOSIS — I10 ESSENTIAL (PRIMARY) HYPERTENSION: ICD-10-CM

## 2020-08-20 DIAGNOSIS — I50.30 UNSPECIFIED DIASTOLIC (CONGESTIVE) HEART FAILURE: ICD-10-CM

## 2020-08-20 LAB
ALBUMIN SERPL ELPH-MCNC: 4 G/DL — SIGNIFICANT CHANGE UP (ref 3.3–5.2)
ALP SERPL-CCNC: 87 U/L — SIGNIFICANT CHANGE UP (ref 40–120)
ALT FLD-CCNC: 25 U/L — SIGNIFICANT CHANGE UP
ANION GAP SERPL CALC-SCNC: 19 MMOL/L — HIGH (ref 5–17)
AST SERPL-CCNC: 19 U/L — SIGNIFICANT CHANGE UP
BILIRUB SERPL-MCNC: 0.4 MG/DL — SIGNIFICANT CHANGE UP (ref 0.4–2)
BLD GP AB SCN SERPL QL: SIGNIFICANT CHANGE UP
BLD GP AB SCN SERPL QL: SIGNIFICANT CHANGE UP
BUN SERPL-MCNC: 36 MG/DL — HIGH (ref 8–20)
CALCIUM SERPL-MCNC: 9.8 MG/DL — SIGNIFICANT CHANGE UP (ref 8.6–10.2)
CHLORIDE SERPL-SCNC: 94 MMOL/L — LOW (ref 98–107)
CO2 SERPL-SCNC: 25 MMOL/L — SIGNIFICANT CHANGE UP (ref 22–29)
CREAT SERPL-MCNC: 4.59 MG/DL — HIGH (ref 0.5–1.3)
DIR ANTIGLOB POLYSPECIFIC INTERPRETATION: SIGNIFICANT CHANGE UP
DIR ANTIGLOB POLYSPECIFIC INTERPRETATION: SIGNIFICANT CHANGE UP
GLUCOSE SERPL-MCNC: 173 MG/DL — HIGH (ref 70–99)
HCT VFR BLD CALC: 26.3 % — LOW (ref 39–50)
HGB BLD-MCNC: 8.5 G/DL — LOW (ref 13–17)
MAGNESIUM SERPL-MCNC: 2.1 MG/DL — SIGNIFICANT CHANGE UP (ref 1.6–2.6)
MCHC RBC-ENTMCNC: 30.2 PG — SIGNIFICANT CHANGE UP (ref 27–34)
MCHC RBC-ENTMCNC: 32.3 GM/DL — SIGNIFICANT CHANGE UP (ref 32–36)
MCV RBC AUTO: 93.6 FL — SIGNIFICANT CHANGE UP (ref 80–100)
MRSA PCR RESULT.: SIGNIFICANT CHANGE UP
PLATELET # BLD AUTO: 235 K/UL — SIGNIFICANT CHANGE UP (ref 150–400)
POST UNIT NUMBER: SIGNIFICANT CHANGE UP
POTASSIUM SERPL-MCNC: 3.7 MMOL/L — SIGNIFICANT CHANGE UP (ref 3.5–5.3)
POTASSIUM SERPL-SCNC: 3.7 MMOL/L — SIGNIFICANT CHANGE UP (ref 3.5–5.3)
PREALB SERPL-MCNC: 24 MG/DL — SIGNIFICANT CHANGE UP (ref 18–38)
PROT SERPL-MCNC: 6.7 G/DL — SIGNIFICANT CHANGE UP (ref 6.6–8.7)
RBC # BLD: 2.81 M/UL — LOW (ref 4.2–5.8)
RBC # FLD: 16.2 % — HIGH (ref 10.3–14.5)
S AUREUS DNA NOSE QL NAA+PROBE: SIGNIFICANT CHANGE UP
SODIUM SERPL-SCNC: 138 MMOL/L — SIGNIFICANT CHANGE UP (ref 135–145)
T3 SERPL-MCNC: 75 NG/DL — LOW (ref 80–200)
T4 AB SER-ACNC: 7.8 UG/DL — SIGNIFICANT CHANGE UP (ref 4.5–12)
TSH SERPL-MCNC: 1.48 UIU/ML — SIGNIFICANT CHANGE UP (ref 0.27–4.2)
WBC # BLD: 9.68 K/UL — SIGNIFICANT CHANGE UP (ref 3.8–10.5)
WBC # FLD AUTO: 9.68 K/UL — SIGNIFICANT CHANGE UP (ref 3.8–10.5)

## 2020-08-20 PROCEDURE — 90937 HEMODIALYSIS REPEATED EVAL: CPT

## 2020-08-20 PROCEDURE — 86078 PHYS BLOOD BANK SERV REACTJ: CPT

## 2020-08-20 PROCEDURE — 99233 SBSQ HOSP IP/OBS HIGH 50: CPT

## 2020-08-20 PROCEDURE — 99232 SBSQ HOSP IP/OBS MODERATE 35: CPT | Mod: 57

## 2020-08-20 RX ORDER — CEFUROXIME AXETIL 250 MG
1500 TABLET ORAL ONCE
Refills: 0 | Status: DISCONTINUED | OUTPATIENT
Start: 2020-08-21 | End: 2020-08-21

## 2020-08-20 RX ORDER — SENNA PLUS 8.6 MG/1
2 TABLET ORAL AT BEDTIME
Refills: 0 | Status: DISCONTINUED | OUTPATIENT
Start: 2020-08-20 | End: 2020-08-21

## 2020-08-20 RX ORDER — PSYLLIUM SEED (WITH DEXTROSE)
1 POWDER (GRAM) ORAL
Refills: 0 | Status: DISCONTINUED | OUTPATIENT
Start: 2020-08-20 | End: 2020-08-21

## 2020-08-20 RX ORDER — ONDANSETRON 8 MG/1
4 TABLET, FILM COATED ORAL ONCE
Refills: 0 | Status: COMPLETED | OUTPATIENT
Start: 2020-08-20 | End: 2020-08-20

## 2020-08-20 RX ORDER — ERGOCALCIFEROL 1.25 MG/1
50000 CAPSULE ORAL
Refills: 0 | Status: DISCONTINUED | OUTPATIENT
Start: 2020-08-20 | End: 2020-08-21

## 2020-08-20 RX ORDER — DEXAMETHASONE 0.5 MG/5ML
6 ELIXIR ORAL ONCE
Refills: 0 | Status: COMPLETED | OUTPATIENT
Start: 2020-08-20 | End: 2020-08-20

## 2020-08-20 RX ORDER — DIPHENHYDRAMINE HCL 50 MG
50 CAPSULE ORAL ONCE
Refills: 0 | Status: COMPLETED | OUTPATIENT
Start: 2020-08-20 | End: 2020-08-20

## 2020-08-20 RX ORDER — ERYTHROPOIETIN 10000 [IU]/ML
12000 INJECTION, SOLUTION INTRAVENOUS; SUBCUTANEOUS
Refills: 0 | Status: DISCONTINUED | OUTPATIENT
Start: 2020-08-20 | End: 2020-08-21

## 2020-08-20 RX ORDER — POLYETHYLENE GLYCOL 3350 17 G/17G
17 POWDER, FOR SOLUTION ORAL DAILY
Refills: 0 | Status: DISCONTINUED | OUTPATIENT
Start: 2020-08-20 | End: 2020-08-21

## 2020-08-20 RX ORDER — DIPHENHYDRAMINE HCL 50 MG
50 CAPSULE ORAL ONCE
Refills: 0 | Status: DISCONTINUED | OUTPATIENT
Start: 2020-08-20 | End: 2020-08-21

## 2020-08-20 RX ADMIN — Medication 50 MILLIGRAM(S): at 18:02

## 2020-08-20 RX ADMIN — CHLORHEXIDINE GLUCONATE 15 MILLILITER(S): 213 SOLUTION TOPICAL at 22:03

## 2020-08-20 RX ADMIN — Medication 1 TABLET(S): at 12:04

## 2020-08-20 RX ADMIN — ERYTHROPOIETIN 12000 UNIT(S): 10000 INJECTION, SOLUTION INTRAVENOUS; SUBCUTANEOUS at 11:34

## 2020-08-20 RX ADMIN — POLYETHYLENE GLYCOL 3350 17 GRAM(S): 17 POWDER, FOR SOLUTION ORAL at 15:09

## 2020-08-20 RX ADMIN — Medication 50 MILLIGRAM(S): at 14:15

## 2020-08-20 RX ADMIN — Medication 60 MILLIGRAM(S): at 22:03

## 2020-08-20 RX ADMIN — CHLORHEXIDINE GLUCONATE 15 MILLILITER(S): 213 SOLUTION TOPICAL at 05:31

## 2020-08-20 RX ADMIN — SENNA PLUS 2 TABLET(S): 8.6 TABLET ORAL at 22:05

## 2020-08-20 RX ADMIN — CHLORHEXIDINE GLUCONATE 1 APPLICATION(S): 213 SOLUTION TOPICAL at 09:22

## 2020-08-20 RX ADMIN — SERTRALINE 25 MILLIGRAM(S): 25 TABLET, FILM COATED ORAL at 22:02

## 2020-08-20 RX ADMIN — CHLORHEXIDINE GLUCONATE 1 APPLICATION(S): 213 SOLUTION TOPICAL at 22:05

## 2020-08-20 RX ADMIN — Medication 81 MILLIGRAM(S): at 12:05

## 2020-08-20 RX ADMIN — ERGOCALCIFEROL 50000 UNIT(S): 1.25 CAPSULE ORAL at 12:04

## 2020-08-20 RX ADMIN — Medication 50 MILLIGRAM(S): at 05:31

## 2020-08-20 RX ADMIN — Medication 90 MILLIGRAM(S): at 05:31

## 2020-08-20 RX ADMIN — ATORVASTATIN CALCIUM 80 MILLIGRAM(S): 80 TABLET, FILM COATED ORAL at 22:03

## 2020-08-20 RX ADMIN — Medication 500 MILLIGRAM(S): at 12:04

## 2020-08-20 RX ADMIN — ONDANSETRON 4 MILLIGRAM(S): 8 TABLET, FILM COATED ORAL at 15:31

## 2020-08-20 RX ADMIN — Medication 1 TABLET(S): at 12:05

## 2020-08-20 RX ADMIN — Medication 6 MILLIGRAM(S): at 14:16

## 2020-08-20 NOTE — PROGRESS NOTE ADULT - SUBJECTIVE AND OBJECTIVE BOX
Patient is a 86y old  Male who presents with a chief complaint of acute on chronic anemia, worsening AS (20 Aug 2020 10:08)      Patient seen and examined at bedside.     ALLERGIES:  Plavix (Hives)  provide vanilla nepro TID- RD ok (Unknown)  Toprol-XL (Rash)    MEDICATIONS  (STANDING):  ascorbic acid 500 milliGRAM(s) Oral daily  aspirin enteric coated 81 milliGRAM(s) Oral daily  atorvastatin 80 milliGRAM(s) Oral at bedtime  chlorhexidine 0.12% Liquid 15 milliLiter(s) Oral Mucosa two times a day  chlorhexidine 4% Liquid 1 Application(s) Topical two times a day  epoetin juan-epbx (RETACRIT) Injectable 05379 Unit(s) IV Push <User Schedule>  ergocalciferol 23252 Unit(s) Oral every week  hydrALAZINE 50 milliGRAM(s) Oral two times a day  isosorbide   mononitrate ER Tablet (IMDUR) 30 milliGRAM(s) Oral daily  multivitamin 1 Tablet(s) Oral daily  Nephro-pito 1 Tablet(s) Oral daily  NIFEdipine XL 60 milliGRAM(s) Oral at bedtime  NIFEdipine XL 90 milliGRAM(s) Oral daily  sertraline 25 milliGRAM(s) Oral at bedtime  torsemide 20 milliGRAM(s) Oral <User Schedule>    MEDICATIONS  (PRN):    Vital Signs Last 24 Hrs  T(F): 98 (20 Aug 2020 10:59), Max: 98.4 (19 Aug 2020 15:18)  HR: 84 (20 Aug 2020 10:59) (73 - 92)  BP: 139/60 (20 Aug 2020 10:59) (124/54 - 150/55)  RR: 17 (20 Aug 2020 10:59) (17 - 20)  SpO2: 95% (20 Aug 2020 10:59) (95% - 100%)  I&O's Summary    PHYSICAL EXAM:  General: NAD, alert  ENT: MMM, no thrush  Neck: Supple, No JVD  Lungs: +crackles  Cardio:+s1/s2 +trace edema b/l le  Abdomen: Soft, Nontender, Nondistended; Bowel sounds present  Extremities: No calf tenderness      LABS:                        8.5    9.68  )-----------( 235      ( 20 Aug 2020 09:06 )             26.3     08-20    138  |  94  |  36.0  ----------------------------<  173  3.7   |  25.0  |  4.59    Ca    9.8      20 Aug 2020 11:16  Phos  2.8     08-19  Mg     2.1     08-20    TPro  6.7  /  Alb  4.0  /  TBili  0.4  /  DBili  x   /  AST  19  /  ALT  25  /  AlkPhos  87  08-20    eGFR if Non African American: 11 mL/min/1.73M2 (08-20-20 @ 11:16)  eGFR if : 12 mL/min/1.73M2 (08-20-20 @ 11:16)    TSH 1.48   TSH with FT4 reflex --  Total T3 75    RADIOLOGY & ADDITIONAL TESTS:    < from: CT Angio Chest/ Abdomen and Pelvis w/ IV Cont (08.17.20 @ 11:21) >  IMPRESSION:  Aortic measurements as described  < end of copied text >    < from: Xray Chest 1 View- PORTABLE-Urgent (08.14.20 @ 19:25) >  INTERPRETATION:  AP chest on August 14, 2020 at 7:14 PM. Patient has weakness patient has renal insufficiency, hypertension, and diabetes. There is history of CHF and anemia of chronic renal disease. Patient has blood loss anemia and is being considered for transfusion. Patient is on dialysis.  Heart is magnified by technique. Loop recorder over the left chest again noted.  The mild central congestive findings are again seen but there is improvement from June 23 in this regard.  < end of copied text >    < from: TTE Echo Complete w/o Contrast w/ Doppler (08.16.20 @ 12:39) >  Summary:   1. Severely enlarged left atrium.   2. There is moderate concentric left ventricular hypertrophy.   3. Left ventricular ejection fraction, by visual estimation, is 60 to 65%. Grade III diastolic dysfunction.   4. Elevated mean left atrial pressure. (LAP = 41 mm Hg)   5. Normal right atrial size.   6. Normal right ventricular size and function.   7. Mild mitral valve regurgitation.   8. Moderate to severe aortic valve stenosis.   9. There is no evidence of pericardial effusion.  < end of copied text >      Care Discussed with Consultants/Other Providers:   CT Surgery

## 2020-08-20 NOTE — CHART NOTE - NSCHARTNOTEFT_GEN_A_CORE
ACPs called to the bedside by RN for suspected blood transfusion reaction. PRBC was being administered electively via HD.   He had onset of tachycardia (ST 100s), hives, and pruritis. PRBC transfusion was stopped, patient received Benadryl IV x1 and Decadron 6mg IV dose x 1.  PRBC transfusion reaction protocol was initiated by ANM.  Will monitor for any changes.

## 2020-08-20 NOTE — PROGRESS NOTE ADULT - SUBJECTIVE AND OBJECTIVE BOX
Brief summary:  86yoM hx ESRD on HD on M/F only, HFpEF, HTN, AS, prior GI bleed from ulcerated polyps and colonic AVMs, presenting with weakness and being admitted for acute on chronic anemia.  Daughter Vanessa Douglas who is RN manager at [employer] at bedside providing most of the hx.  Pt was recently started on HD in 2020 and receives it twice weekly due to pt preference.  He was unable to get full session of HD and his epogen injection on Monday this week due to ‘infiltration of his fistula’, but was able to get his full session of HD earlier today because his fistula became functional again.  Daughter was told ‘they removed extra fluid’ but she’s unsure of how much liters was taken off.  She was notified after HD that his blood counts were low with Hgb of 7.1 and she has noticed that ‘he looks weaker’ and hasn’t been as independent as he normally is recently.   She spoke to his nephrologist who advised that pt be taken to hospital given his recent his symptoms and worsening anemia. Pt also has been having chronic exertional dyspnea without significant relief with HD sessions that his cardiologist believes may be due to his worsening AS and daughter would like inpatient work up of TAVR.   Per daughter pt was last seen in cardiologist office earlier this AM and had TTE that showed worsening AS.  Pt denies any active bleeding including melena, hematochezia, hematuria.  He also denies any fever, chills, chest pain, cough, abdominal pain.  Pt still produces urine and is on a diuretic on his non-HD days which day reports had just been increased today as per his cardiologist.  On admission, Hgb 6.8.     Overnight events: No acute events overnight.       PAST MEDICAL & SURGICAL HISTORY:  GI bleeding: due to ulcerated polyps and colonic AVMs  Aortic stenosis  ESRD on dialysis: HD on  and   Risk factors for obstructive sleep apnea  Anemia  RICHARDS (dyspnea on exertion)  VT (ventricular tachycardia)  HTN (hypertension)  CAD (coronary artery disease)  Arrhythmia  AV block, 1st degree  DM (diabetes mellitus)  H/O carotid endarterectomy: Right  A-V fistula: left arm 2017  H/O angioplasty: ,  no  intervention  H/O left knee surgery  H/O circumcision: at  age  65    Medications:  ascorbic acid 500 milliGRAM(s) Oral daily  aspirin enteric coated 81 milliGRAM(s) Oral daily  atorvastatin 80 milliGRAM(s) Oral at bedtime  chlorhexidine 0.12% Liquid 15 milliLiter(s) Oral Mucosa two times a day  chlorhexidine 4% Liquid 1 Application(s) Topical two times a day  epoetin juan-epbx (RETACRIT) Injectable 39794 Unit(s) SubCutaneous <User Schedule>  ergocalciferol 56011 Unit(s) Oral every week  hydrALAZINE 50 milliGRAM(s) Oral two times a day  isosorbide   mononitrate ER Tablet (IMDUR) 30 milliGRAM(s) Oral daily  multivitamin 1 Tablet(s) Oral daily  Nephro-pito 1 Tablet(s) Oral daily  NIFEdipine XL 60 milliGRAM(s) Oral at bedtime  NIFEdipine XL 90 milliGRAM(s) Oral daily  sertraline 25 milliGRAM(s) Oral at bedtime  torsemide 20 milliGRAM(s) Oral <User Schedule>    Daily     Daily Weight in k (19 Aug 2020 12:51)                          8.5    9.68  )-----------( 235      ( 20 Aug 2020 09:06 )             26.3       141  |  99  |  18.0  ----------------------------<  123<H>  3.5   |  29.0  |  2.79<H>    Ca    9.6      19 Aug 2020 06:32  Phos  2.8         TPro  x   /  Alb  3.6  /  TBili  x   /  DBili  x   /  AST  x   /  ALT  x   /  AlkPhos  x       Objective:  T(C): 36.7 (20 @ 05:28), Max: 36.9 (20 @ 15:18)  HR: 92 (20 @ 05:28) (73 - 92)  BP: 147/64 (20 @ 05:28) (124/54 - 150/55)  RR: 18 (20 @ 05:28) (17 - 18)  SpO2: 100% (20 @ 19:58) (95% - 100%)    I&O's Summary      Physical Exam  Neuro: A+O x 3, non-focal, speech clear and intact  Pulm: CTA, equal bilaterally  CV: RRR, irregularly irregular, +S1S2  Abd: soft, NT, ND, +BS  Ext: +DP Pulses b/l, +PT pulses, no edema    Imaging:  TTE:  < from: TTE Echo Complete w/o Contrast w/ Doppler (20 @ 12:39) >   1. Severely enlarged left atrium.   2. There is moderate concentric left ventricular hypertrophy.   3. Left ventricular ejection fraction, by visual estimation, is 60 to 65%. Grade III diastolic dysfunction.   4. Elevated mean left atrial pressure. (LAP = 41 mm Hg)   5. Normal right atrial size.   6. Normal right ventricular size and function.   7. Mild mitral valve regurgitation.   8. Moderate to severe aortic valve stenosis.   9. There is no evidence of pericardial effusion.  < end of copied text >    CTA Chest / Abdomen / Pelvis  < from: CT Angio Chest w/ IV Cont (20 @ 11:21) >  NON-VASCULAR FINDINGS:    Thyroid gland: unremarkable  Axillary lymph nodes: No significant.  Mediastinal lymph nodes: No significant.  Hilar Adenopathy: Nosignificant.  Heart: Enlarged heart. Aortic valve thickening calcified. Mitral valve anulus calcified.  Coronary artery calcifications: Triple-vessel coronary calcifications.  Pericardial effusion: No significant.  Lungs: Small bilateral effusions. Tracheobronchial tree patent.  Liver: normal in size and configuration without focal mass.  Gallbladder: Gallstones.  Spleen:   normal in size.  Pancreas:   unremarkable.  Adrenal: No masses are seen.  Kidneys: . Bilateral cortical thinning. Bilateral renal cysts. Low-density lesions are too small to characterize.  No paraaortic or pelvic adenopathy.  Small bowel: No obstruction.  Colon: No wall thickening or pericolonic inflammatory changes.  Appendix:   within normal limits  Urinary bladder: unremarkable.  < end of copied text >< from: Cardiac Cath Lab - Adult (20 @ 12:22) >    Cath  < from: Cardiac Cath Lab - Adult (20 @ 12:22) >  VENTRICLES: No left ventriculogram was performed.  CORONARY VESSELS: The coronary circulation is co-dominant.  LM:   --  Ostial LM: There was a 30 % stenosis.  LAD:   --  Mid LAD: There was a 50 % stenosis.  --  Distal LAD: There was a 60 % stenosis.  CX:   --  Proximal circumflex: There was a 20 % stenosis.  RCA:   --  Mid RCA: There was a 70 % stenosis. Superior takeoff.  COMPLICATIONS: No complications occurred during the cath lab visit.  DIAGNOSTIC IMPRESSIONS: Severe RCA disease but co-dominant vessel, does not  supply large territory.  Moderate LAD disease- similar to angiogram from 2017.  Peak to peak gardient of 55-60 mmHg- consistent with severe AS.  LVEDP 14 mmHg.  DIAGNOSTIC RECOMMENDATIONS: Medical management of CAD.  TAVR evaluation. (Plavix allergy- may have to consider using effient as  DAPT post TAVR)  Prepared and signed by  Raymond Mariscal MD  Signed 2020 19:11:56  < end of copied text > Brief summary:  86yoM hx ESRD on HD on M/F only, HFpEF, HTN, AS, prior GI bleed from ulcerated polyps and colonic AVMs, presenting with weakness and being admitted for acute on chronic anemia.  Daughter Vanessa Douglas who is RN manager at [employer] at bedside providing most of the hx.  Pt was recently started on HD in 2020 and receives it twice weekly due to pt preference.  He was unable to get full session of HD and his epogen injection on Monday this week due to ‘infiltration of his fistula’, but was able to get his full session of HD earlier today because his fistula became functional again.  Daughter was told ‘they removed extra fluid’ but she’s unsure of how much liters was taken off.  She was notified after HD that his blood counts were low with Hgb of 7.1 and she has noticed that ‘he looks weaker’ and hasn’t been as independent as he normally is recently.   She spoke to his nephrologist who advised that pt be taken to hospital given his recent his symptoms and worsening anemia. Pt also has been having chronic exertional dyspnea without significant relief with HD sessions that his cardiologist believes may be due to his worsening AS and daughter would like inpatient work up of TAVR.   Per daughter pt was last seen in cardiologist office earlier this AM and had TTE that showed worsening AS.  Pt denies any active bleeding including melena, hematochezia, hematuria.  He also denies any fever, chills, chest pain, cough, abdominal pain.  Pt still produces urine and is on a diuretic on his non-HD days which day reports had just been increased today as per his cardiologist.  On admission, Hgb 6.8.     Overnight events: No acute events overnight.   Patient denies acute pain with radiating or aggravating factors.  He denies chest pain, shortness of breath, palpitations, headache, dizziness, nausea, or vomiting.    PAST MEDICAL & SURGICAL HISTORY:  GI bleeding: due to ulcerated polyps and colonic AVMs  Aortic stenosis  ESRD on dialysis: HD on  and   Risk factors for obstructive sleep apnea  Anemia  RICHARDS (dyspnea on exertion)  VT (ventricular tachycardia)  HTN (hypertension)  CAD (coronary artery disease)  Arrhythmia  AV block, 1st degree  DM (diabetes mellitus)  H/O carotid endarterectomy: Right  A-V fistula: left arm 2017  H/O angioplasty: 2013,  no  intervention  H/O left knee surgery  H/O circumcision: at  age  65    Medications:  ascorbic acid 500 milliGRAM(s) Oral daily  aspirin enteric coated 81 milliGRAM(s) Oral daily  atorvastatin 80 milliGRAM(s) Oral at bedtime  chlorhexidine 0.12% Liquid 15 milliLiter(s) Oral Mucosa two times a day  chlorhexidine 4% Liquid 1 Application(s) Topical two times a day  epoetin juan-epbx (RETACRIT) Injectable 61858 Unit(s) SubCutaneous <User Schedule>  ergocalciferol 48051 Unit(s) Oral every week  hydrALAZINE 50 milliGRAM(s) Oral two times a day  isosorbide   mononitrate ER Tablet (IMDUR) 30 milliGRAM(s) Oral daily  multivitamin 1 Tablet(s) Oral daily  Nephro-pito 1 Tablet(s) Oral daily  NIFEdipine XL 60 milliGRAM(s) Oral at bedtime  NIFEdipine XL 90 milliGRAM(s) Oral daily  sertraline 25 milliGRAM(s) Oral at bedtime  torsemide 20 milliGRAM(s) Oral <User Schedule>    Daily     Daily Weight in k (19 Aug 2020 12:51)                          8.5    9.68  )-----------( 235      ( 20 Aug 2020 09:06 )             26.3       141  |  99  |  18.0  ----------------------------<  123<H>  3.5   |  29.0  |  2.79<H>    Ca    9.6      19 Aug 2020 06:32  Phos  2.8         TPro  x   /  Alb  3.6  /  TBili  x   /  DBili  x   /  AST  x   /  ALT  x   /  AlkPhos  x       Objective:  T(C): 36.7 (20 @ 05:28), Max: 36.9 (20 @ 15:18)  HR: 92 (20 @ 05:28) (73 - 92)  BP: 147/64 (20 @ 05:28) (124/54 - 150/55)  RR: 18 (20 @ 05:28) (17 - 18)  SpO2: 100% (20 @ 19:58) (95% - 100%)    Physical Exam  Neuro: A+O x 3, non-focal, speech clear and intact  Pulm: CTA, equal bilaterally  CV: RRR, irregularly irregular, +S1S2  Abd: soft, NT, ND, +BS  Ext: +DP Pulses b/l, +PT pulses, no edema    Imaging:  TTE:  < from: TTE Echo Complete w/o Contrast w/ Doppler (20 @ 12:39) >   1. Severely enlarged left atrium.   2. There is moderate concentric left ventricular hypertrophy.   3. Left ventricular ejection fraction, by visual estimation, is 60 to 65%. Grade III diastolic dysfunction.   4. Elevated mean left atrial pressure. (LAP = 41 mm Hg)   5. Normal right atrial size.   6. Normal right ventricular size and function.   7. Mild mitral valve regurgitation.   8. Moderate to severe aortic valve stenosis.   9. There is no evidence of pericardial effusion.  < end of copied text >    CTA Chest / Abdomen / Pelvis  < from: CT Angio Chest w/ IV Cont (20 @ 11:21) >  NON-VASCULAR FINDINGS:    Thyroid gland: unremarkable  Axillary lymph nodes: No significant.  Mediastinal lymph nodes: No significant.  Hilar Adenopathy: Nosignificant.  Heart: Enlarged heart. Aortic valve thickening calcified. Mitral valve anulus calcified.  Coronary artery calcifications: Triple-vessel coronary calcifications.  Pericardial effusion: No significant.  Lungs: Small bilateral effusions. Tracheobronchial tree patent.  Liver: normal in size and configuration without focal mass.  Gallbladder: Gallstones.  Spleen:   normal in size.  Pancreas:   unremarkable.  Adrenal: No masses are seen.  Kidneys: . Bilateral cortical thinning. Bilateral renal cysts. Low-density lesions are too small to characterize.  No paraaortic or pelvic adenopathy.  Small bowel: No obstruction.  Colon: No wall thickening or pericolonic inflammatory changes.  Appendix:   within normal limits  Urinary bladder: unremarkable.  < end of copied text >< from: Cardiac Cath Lab - Adult (20 @ 12:22) >    Cath  < from: Cardiac Cath Lab - Adult (20 @ 12:22) >  VENTRICLES: No left ventriculogram was performed.  CORONARY VESSELS: The coronary circulation is co-dominant.  LM:   --  Ostial LM: There was a 30 % stenosis.  LAD:   --  Mid LAD: There was a 50 % stenosis.  --  Distal LAD: There was a 60 % stenosis.  CX:   --  Proximal circumflex: There was a 20 % stenosis.  RCA:   --  Mid RCA: There was a 70 % stenosis. Superior takeoff.  COMPLICATIONS: No complications occurred during the cath lab visit.  DIAGNOSTIC IMPRESSIONS: Severe RCA disease but co-dominant vessel, does not  supply large territory.  Moderate LAD disease- similar to angiogram from 2017.  Peak to peak gardient of 55-60 mmHg- consistent with severe AS.  LVEDP 14 mmHg.  DIAGNOSTIC RECOMMENDATIONS: Medical management of CAD.  TAVR evaluation. (Plavix allergy- may have to consider using effient as  DAPT post TAVR)  Prepared and signed by  Raymond Mariscal MD  Signed 2020 19:11:56  < end of copied text > Brief summary:  86yoM hx ESRD on HD on M/F only, HFpEF, HTN, AS, prior GI bleed from ulcerated polyps and colonic AVMs, presenting with weakness and being admitted for acute on chronic anemia.  Daughter Vanessa Douglas who is RN manager at [facility] [employer] at bedside providing most of the hx.  Pt was recently started on HD in 2020 and receives it twice weekly due to pt preference.  He was unable to get full session of HD and his epogen injection on Monday this week due to ‘infiltration of his fistula’, but was able to get his full session of HD earlier today because his fistula became functional again.  Daughter was told ‘they removed extra fluid’ but she’s unsure of how much liters was taken off.  She was notified after HD that his blood counts were low with Hgb of 7.1 and she has noticed that ‘he looks weaker’ and hasn’t been as independent as he normally is recently.   She spoke to his nephrologist who advised that pt be taken to hospital given his recent his symptoms and worsening anemia. Pt also has been having chronic exertional dyspnea without significant relief with HD sessions that his cardiologist believes may be due to his worsening AS and daughter would like inpatient work up of TAVR.   Per daughter pt was last seen in cardiologist office earlier this AM and had TTE that showed worsening AS.  Pt denies any active bleeding including melena, hematochezia, hematuria.  He also denies any fever, chills, chest pain, cough, abdominal pain.  Pt still produces urine and is on a diuretic on his non-HD days which day reports had just been increased today as per his cardiologist.  On admission, Hgb 6.8.     Overnight events: No acute events overnight.   Patient denies acute pain with radiating or aggravating factors.  He denies chest pain, shortness of breath, palpitations, headache, dizziness, nausea, or vomiting.    PAST MEDICAL & SURGICAL HISTORY:  GI bleeding: due to ulcerated polyps and colonic AVMs  Aortic stenosis  ESRD on dialysis: HD on  and   Risk factors for obstructive sleep apnea  Anemia  RICHARDS (dyspnea on exertion)  VT (ventricular tachycardia)  HTN (hypertension)  CAD (coronary artery disease)  Arrhythmia  AV block, 1st degree  DM (diabetes mellitus)  H/O carotid endarterectomy: Right  A-V fistula: left arm 2017  H/O angioplasty: 2013,  no  intervention  H/O left knee surgery  H/O circumcision: at  age  65    Medications:  ascorbic acid 500 milliGRAM(s) Oral daily  aspirin enteric coated 81 milliGRAM(s) Oral daily  atorvastatin 80 milliGRAM(s) Oral at bedtime  chlorhexidine 0.12% Liquid 15 milliLiter(s) Oral Mucosa two times a day  chlorhexidine 4% Liquid 1 Application(s) Topical two times a day  epoetin juan-epbx (RETACRIT) Injectable 06163 Unit(s) SubCutaneous <User Schedule>  ergocalciferol 91924 Unit(s) Oral every week  hydrALAZINE 50 milliGRAM(s) Oral two times a day  isosorbide   mononitrate ER Tablet (IMDUR) 30 milliGRAM(s) Oral daily  multivitamin 1 Tablet(s) Oral daily  Nephro-pito 1 Tablet(s) Oral daily  NIFEdipine XL 60 milliGRAM(s) Oral at bedtime  NIFEdipine XL 90 milliGRAM(s) Oral daily  sertraline 25 milliGRAM(s) Oral at bedtime  torsemide 20 milliGRAM(s) Oral <User Schedule>    Daily     Daily Weight in k (19 Aug 2020 12:51)                          8.5    9.68  )-----------( 235      ( 20 Aug 2020 09:06 )             26.3       141  |  99  |  18.0  ----------------------------<  123<H>  3.5   |  29.0  |  2.79<H>    Ca    9.6      19 Aug 2020 06:32  Phos  2.8         TPro  x   /  Alb  3.6  /  TBili  x   /  DBili  x   /  AST  x   /  ALT  x   /  AlkPhos  x       Objective:  T(C): 36.7 (20 @ 05:28), Max: 36.9 (20 @ 15:18)  HR: 92 (20 @ 05:28) (73 - 92)  BP: 147/64 (20 @ 05:28) (124/54 - 150/55)  RR: 18 (20 @ 05:28) (17 - 18)  SpO2: 100% (20 @ 19:58) (95% - 100%)    Physical Exam  Neuro: A+O x 3, non-focal, speech clear and intact  Pulm: CTA, equal bilaterally  CV: loud systolic ejection murmur, +S1S2  Abd: soft, NT, ND, +BS  Ext: +DP Pulses b/l, +PT pulses, no edema    Imaging:  TTE:  < from: TTE Echo Complete w/o Contrast w/ Doppler (20 @ 12:39) >   1. Severely enlarged left atrium.   2. There is moderate concentric left ventricular hypertrophy.   3. Left ventricular ejection fraction, by visual estimation, is 60 to 65%. Grade III diastolic dysfunction.   4. Elevated mean left atrial pressure. (LAP = 41 mm Hg)   5. Normal right atrial size.   6. Normal right ventricular size and function.   7. Mild mitral valve regurgitation.   8. Moderate to severe aortic valve stenosis.   9. There is no evidence of pericardial effusion.  < end of copied text >    CTA Chest / Abdomen / Pelvis  < from: CT Angio Chest w/ IV Cont (20 @ 11:21) >  NON-VASCULAR FINDINGS:    Thyroid gland: unremarkable  Axillary lymph nodes: No significant.  Mediastinal lymph nodes: No significant.  Hilar Adenopathy: Nosignificant.  Heart: Enlarged heart. Aortic valve thickening calcified. Mitral valve anulus calcified.  Coronary artery calcifications: Triple-vessel coronary calcifications.  Pericardial effusion: No significant.  Lungs: Small bilateral effusions. Tracheobronchial tree patent.  Liver: normal in size and configuration without focal mass.  Gallbladder: Gallstones.  Spleen:   normal in size.  Pancreas:   unremarkable.  Adrenal: No masses are seen.  Kidneys: . Bilateral cortical thinning. Bilateral renal cysts. Low-density lesions are too small to characterize.  No paraaortic or pelvic adenopathy.  Small bowel: No obstruction.  Colon: No wall thickening or pericolonic inflammatory changes.  Appendix:   within normal limits  Urinary bladder: unremarkable.  < end of copied text >< from: Cardiac Cath Lab - Adult (20 @ 12:22) >    Cath  < from: Cardiac Cath Lab - Adult (20 @ 12:22) >  VENTRICLES: No left ventriculogram was performed.  CORONARY VESSELS: The coronary circulation is co-dominant.  LM:   --  Ostial LM: There was a 30 % stenosis.  LAD:   --  Mid LAD: There was a 50 % stenosis.  --  Distal LAD: There was a 60 % stenosis.  CX:   --  Proximal circumflex: There was a 20 % stenosis.  RCA:   --  Mid RCA: There was a 70 % stenosis. Superior takeoff.  COMPLICATIONS: No complications occurred during the cath lab visit.  DIAGNOSTIC IMPRESSIONS: Severe RCA disease but co-dominant vessel, does not  supply large territory.  Moderate LAD disease- similar to angiogram from 2017.  Peak to peak gardient of 55-60 mmHg- consistent with severe AS.  LVEDP 14 mmHg.  DIAGNOSTIC RECOMMENDATIONS: Medical management of CAD.  TAVR evaluation. (Plavix allergy- may have to consider using effient as  DAPT post TAVR)  Prepared and signed by  Raymond Mariscal MD  Signed 2020 19:11:56  < end of copied text >

## 2020-08-20 NOTE — PROGRESS NOTE ADULT - ASSESSMENT
86yoM hx ESRD on HD on M/F only, HFpEF, HTN, AS, prior GI bleed from ulcerated polyps and colonic AVMs presenting with worsening weakness fatigue after missing HD session with epogen injection once this week found to have worsening anemia on outpatient labs, also with worsening AS     #Acute on chronic anemia in setting of ESRD  - No recent or active bleeding likely related to renal insufficiency and missed epogen injection  - baseline Hgb appears to be 8-10  - s/p prbc x 2   - monitor h and h   - Nephrology consult appreciated     #ESRD on HD   - nephrology consult appreciated   - HD per nephro    #Elevated troponin   - Chronically elevated sine 6/2020, likely due to ESRD    #AS  - Pt with chronic dyspnea, deemed secondary to worsening AS  - CT surgery consult appreciated  - cardio consult appreciated     #HFpEF with mild acute decompensation   - CXR with mild central congestion, but improvement from 6/23 study  - PO torsemide on non-HD days   - Cardiology consult appreciated    #CAD  - ASA, statin     #HTN- Essential  - Hydralazine, nifedipine, imdur resumed  - monitor blood pressure     #moderate protein calorie malnutrtion  - nephro  - nutritionist consult appreciated    #DVT prophylaxis  - venodynes  - no chemical a/c given hx of gi bleed from polyps, avms, recent fobt+    patient hospital course to date d/w patient daughter Vanessa all questions answered. TAVR Friday. Patient now being transferred to CT Surgery service given TAVR. Hospitalist Medicine will be signing off. Thank You for allowing Medicine to participate in the care of this patient. Please recall medicine as needed.

## 2020-08-20 NOTE — PROGRESS NOTE ADULT - ASSESSMENT
86 year old male patient with medical history of Male with prior history of anomalous right coronary, nonobstructive CAD, hypertension, hyperlipidemia, history of ventricular arrhythmias, history of diastolic CHF, Aortic stenosis, carotid disease, ESRD on HD 2 times a week (Monday/friday) with persistent volume overload, prior GI bleed from ulcerated polyps and colonic AVMs, presented with SOB, RICHARDS, acute on chronic anemia in the setting of worsening symptomatic aortic stenosis. CT Surgery consult called for TAVR workup.He underwent TAVR work up and is now scheduled to go to the OR 8/21/20.

## 2020-08-20 NOTE — PROGRESS NOTE ADULT - SUBJECTIVE AND OBJECTIVE BOX
[facility] DIVISION OF KIDNEY DISEASES AND HYPERTENSION -- HEMODIALYSIS NOTE  --------------------------------------------------------------------------------  Chief Complaint: ESRD/Ongoing hemodialysis requirement    24 hour events/subjective:    PAST HISTORY  --------------------------------------------------------------------------------  No significant changes to PMH, PSH, FHx, SHx, unless otherwise noted    ALLERGIES & MEDICATIONS  --------------------------------------------------------------------------------  Allergies    Plavix (Hives)  Toprol-XL (Rash)    Intolerances    provide vanilla nepro TID- RD ok (Unknown)    Standing Inpatient Medications  ascorbic acid 500 milliGRAM(s) Oral daily  aspirin enteric coated 81 milliGRAM(s) Oral daily  atorvastatin 80 milliGRAM(s) Oral at bedtime  chlorhexidine 0.12% Liquid 15 milliLiter(s) Oral Mucosa two times a day  chlorhexidine 4% Liquid 1 Application(s) Topical two times a day  dextrose 5%. 1000 milliLiter(s) IV Continuous <Continuous>  epoetin juan-epbx (RETACRIT) Injectable 44555 Unit(s) SubCutaneous <User Schedule>  ergocalciferol 29420 Unit(s) Oral every week  hydrALAZINE 50 milliGRAM(s) Oral two times a day  isosorbide   mononitrate ER Tablet (IMDUR) 30 milliGRAM(s) Oral daily  multivitamin 1 Tablet(s) Oral daily  Nephro-pito 1 Tablet(s) Oral daily  NIFEdipine XL 60 milliGRAM(s) Oral at bedtime  NIFEdipine XL 90 milliGRAM(s) Oral daily  sertraline 25 milliGRAM(s) Oral at bedtime  torsemide 20 milliGRAM(s) Oral <User Schedule>    PRN Inpatient Medications    REVIEW OF SYSTEMS  --------------------------------------------------------------------------------  Gen: No weight changes, fatigue, fevers/chills, weakness  Skin: No rashes  Head/Eyes/Ears/Mouth: No headache; Normal hearing; Normal vision w/o blurriness; No sinus pain/discomfort, sore throat  Respiratory: No dyspnea, cough, wheezing, hemoptysis  CV: No chest pain, PND, orthopnea  GI: No abdominal pain, diarrhea, constipation, nausea, vomiting, melena, hematochezia  : No increased frequency, dysuria, hematuria, nocturia  MSK: No joint pain/swelling; no back pain; no edema  Neuro: No dizziness/lightheadedness, weakness, seizures, numbness, tingling  Heme: No easy bruising or bleeding  Endo: No heat/cold intolerance  Psych: No significant nervousness, anxiety, stress, depression    All other systems were reviewed and are negative, except as noted.    VITALS/PHYSICAL EXAM  --------------------------------------------------------------------------------  T(C): 36.7 (08-20-20 @ 05:28), Max: 36.9 (08-19-20 @ 15:18)  HR: 92 (08-20-20 @ 05:28) (73 - 92)  BP: 147/64 (08-20-20 @ 05:28) (124/54 - 150/55)  RR: 18 (08-20-20 @ 05:28) (17 - 18)  SpO2: 100% (08-19-20 @ 19:58) (95% - 100%)    Physical Exam:  	Gen: NAD, well-appearing  	HEENT: PERRL, supple neck, clear oropharynx  	Pulm: CTA B/L  	CV: RRR, S1S2; no rub  	Back: No spinal or CVA tenderness; no sacral edema  	Abd: +BS, soft, nontender/nondistended  	: No suprapubic tenderness  	UE: Warm, FROM, no clubbing, intact strength; no edema; no asterixis  	LE: Warm, FROM, no clubbing, intact strength; no edema  	Neuro: No focal deficits, intact gait  	Psych: Normal affect and mood  	Skin: Warm, without rashes  	Vascular access:    LABS/STUDIES  --------------------------------------------------------------------------------              8.5    9.68  >-----------<  235      [08-20-20 @ 09:06]              26.3     141  |  99  |  18.0  ----------------------------<  123      [08-19-20 @ 06:32]  3.5   |  29.0  |  2.79        Ca     9.6     [08-19-20 @ 06:32]      Phos  2.8     [08-19-20 @ 06:32]    TPro  x   /  Alb  3.6  /  TBili  x   /  DBili  x   /  AST  x   /  ALT  x   /  AlkPhos  x   [08-19-20 @ 06:32]    Iron 53, TIBC 266, %sat 20      [08-19-20 @ 06:32]  Ferritin 184      [08-19-20 @ 06:32]  PTH -- (Ca 9.6)      [08-19-20 @ 16:08]   91  Vitamin D (25OH) 26.4      [08-19-20 @ 16:08]  HbA1c 4.9      [08-09-18 @ 05:54]

## 2020-08-20 NOTE — PROGRESS NOTE ADULT - SUBJECTIVE AND OBJECTIVE BOX
[facility] [place]                                   [address] [place] [address]                                                      PHONE: [phone]                                                         FAX: [phone]  [email or web address]  ---------------------------------------------------------------------------------------------------------------------------------    Overnight events/patient complaints:  Patient had a rough night.  He was retching and vomited which prevented quality sleep.  No chest pain.  Dyspnea is improving.      PAST MEDICAL & SURGICAL HISTORY:  GI bleeding: due to ulcerated polyps and colonic AVMs  Aortic stenosis  ESRD on dialysis: HD on Mondays and Fridays  Risk factors for obstructive sleep apnea  Anemia  RICHARDS (dyspnea on exertion)  VT (ventricular tachycardia)  HTN (hypertension)  CAD (coronary artery disease)  Arrhythmia  AV block, 1st degree  DM (diabetes mellitus)  H/O carotid endarterectomy: Right  A-V fistula: left arm 5/2017  H/O angioplasty: 2013,  no  intervention  H/O left knee surgery  H/O circumcision: at  age  65      Plavix (Hives)  provide vanilla nepro TID- RD ok (Unknown)  Toprol-XL (Rash)    MEDICATIONS  (STANDING):  ascorbic acid 500 milliGRAM(s) Oral daily  aspirin enteric coated 81 milliGRAM(s) Oral daily  atorvastatin 80 milliGRAM(s) Oral at bedtime  chlorhexidine 0.12% Liquid 15 milliLiter(s) Oral Mucosa two times a day  chlorhexidine 4% Liquid 1 Application(s) Topical two times a day  dextrose 5%. 1000 milliLiter(s) (30 mL/Hr) IV Continuous <Continuous>  epoetin juan-epbx (RETACRIT) Injectable 63643 Unit(s) SubCutaneous <User Schedule>  ergocalciferol 94605 Unit(s) Oral every week  hydrALAZINE 50 milliGRAM(s) Oral two times a day  isosorbide   mononitrate ER Tablet (IMDUR) 30 milliGRAM(s) Oral daily  multivitamin 1 Tablet(s) Oral daily  Nephro-pito 1 Tablet(s) Oral daily  NIFEdipine XL 60 milliGRAM(s) Oral at bedtime  NIFEdipine XL 90 milliGRAM(s) Oral daily  sertraline 25 milliGRAM(s) Oral at bedtime  torsemide 20 milliGRAM(s) Oral <User Schedule>    MEDICATIONS  (PRN):      Vital Signs Last 24 Hrs  T(C): 36.7 (20 Aug 2020 05:28), Max: 36.9 (19 Aug 2020 15:18)  T(F): 98 (20 Aug 2020 05:28), Max: 98.4 (19 Aug 2020 15:18)  HR: 92 (20 Aug 2020 05:28) (73 - 92)  BP: 147/64 (20 Aug 2020 05:28) (124/54 - 150/55)  BP(mean): --  RR: 18 (20 Aug 2020 05:28) (17 - 18)  SpO2: 100% (19 Aug 2020 19:58) (95% - 100%)  ICU Vital Signs Last 24 Hrs  CHRISTIANO ELIZABETH  I&O's Detail    I&O's Summary    Drug Dosing Weight  CHRISTIANO ELIZABETH      PHYSICAL EXAM:  General: Appears well developed, well nourished alert and cooperative.  HEENT: Head; normocephalic, atraumatic.  Eyes: Pupils reactive, cornea wnl.  Neck: Supple, no nodes adenopathy, no NVD or carotid bruit or thyromegaly.  CARDIOVASCULAR: Normal S1 and S2, 3/6 DEANA at RUSB radiating to carotid artery  LUNGS: No rales, rhonchi or wheeze. Normal breath sounds bilaterally.  ABDOMEN: Soft, nontender without mass or organomegaly. bowel sounds normoactive.  EXTREMITIES: No clubbing, cyanosis or edema. Distal pulses wnl.   SKIN: warm and dry with normal turgor.  NEURO: Alert/oriented x 3/normal motor exam. No pathologic reflexes.    PSYCH: normal affect.        LABS:                        8.5    9.68  )-----------( 235      ( 20 Aug 2020 09:06 )             26.3     08-19    141  |  99  |  18.0  ----------------------------<  123<H>  3.5   |  29.0  |  2.79<H>    Ca    9.6      19 Aug 2020 06:32  Phos  2.8     08-19    TPro  x   /  Alb  3.6  /  TBili  x   /  DBili  x   /  AST  x   /  ALT  x   /  AlkPhos  x   08-19    CHRISTIANO ELIZABETH        RADIOLOGY & ADDITIONAL STUDIES:    INTERPRETATION OF TELEMETRY (personally reviewed):    ECG:        ASSESSMENT AND PLAN:  In summary, CHRISTIANO ELIZABETH is an 89y Male with HTN HLD nonobstructive CAD, HFpEF/valvular heart failure, ESRD on HD severe AS planned for TAVR on 8/21    Plan  Severe aortic stenosis- loud, late peaking DEANA on exam; TAVR tomorrow     ESRD on HD- dialysis session today.       Valvular heart failure- HD for fluid management.  Euvolemic on exam.  BNP improving.     HLD- continue atorvastatin 80mg qhs        Thank you for allowing [facility] to participate in the care of this patient.  Please feel free to call with any questions or concerns.

## 2020-08-20 NOTE — PRE-OP CHECKLIST - SELECT TESTS ORDERED
Results in MD note Type and Cross/Type and Screen/BMP/CBC/Results in MD note/EKG Hepatic Function/EKG/Urinalysis/BMP/CBC/PT/PTT/Type and Cross/Results in MD note/Type and Screen

## 2020-08-21 ENCOUNTER — APPOINTMENT (OUTPATIENT)
Dept: CARDIOTHORACIC SURGERY | Facility: CLINIC | Age: 85
End: 2020-08-21

## 2020-08-21 ENCOUNTER — APPOINTMENT (OUTPATIENT)
Dept: CARDIOTHORACIC SURGERY | Facility: HOSPITAL | Age: 85
End: 2020-08-21

## 2020-08-21 LAB
A1C WITH ESTIMATED AVERAGE GLUCOSE RESULT: 5 % — SIGNIFICANT CHANGE UP (ref 4–5.6)
ALBUMIN SERPL ELPH-MCNC: 3.3 G/DL — SIGNIFICANT CHANGE UP (ref 3.3–5.2)
ALBUMIN SERPL ELPH-MCNC: 3.8 G/DL — SIGNIFICANT CHANGE UP (ref 3.3–5.2)
ALP SERPL-CCNC: 77 U/L — SIGNIFICANT CHANGE UP (ref 40–120)
ALP SERPL-CCNC: 91 U/L — SIGNIFICANT CHANGE UP (ref 40–120)
ALT FLD-CCNC: 18 U/L — SIGNIFICANT CHANGE UP
ALT FLD-CCNC: 22 U/L — SIGNIFICANT CHANGE UP
ANION GAP SERPL CALC-SCNC: 15 MMOL/L — SIGNIFICANT CHANGE UP (ref 5–17)
ANION GAP SERPL CALC-SCNC: 15 MMOL/L — SIGNIFICANT CHANGE UP (ref 5–17)
APTT BLD: 28.2 SEC — SIGNIFICANT CHANGE UP (ref 27.5–35.5)
AST SERPL-CCNC: 19 U/L — SIGNIFICANT CHANGE UP
AST SERPL-CCNC: 20 U/L — SIGNIFICANT CHANGE UP
BILIRUB SERPL-MCNC: 0.5 MG/DL — SIGNIFICANT CHANGE UP (ref 0.4–2)
BILIRUB SERPL-MCNC: 0.6 MG/DL — SIGNIFICANT CHANGE UP (ref 0.4–2)
BUN SERPL-MCNC: 21 MG/DL — HIGH (ref 8–20)
BUN SERPL-MCNC: 25 MG/DL — HIGH (ref 8–20)
CALCIUM SERPL-MCNC: 9.3 MG/DL — SIGNIFICANT CHANGE UP (ref 8.6–10.2)
CALCIUM SERPL-MCNC: 9.8 MG/DL — SIGNIFICANT CHANGE UP (ref 8.6–10.2)
CHLORIDE SERPL-SCNC: 96 MMOL/L — LOW (ref 98–107)
CHLORIDE SERPL-SCNC: 97 MMOL/L — LOW (ref 98–107)
CK SERPL-CCNC: 48 U/L — SIGNIFICANT CHANGE UP (ref 30–200)
CO2 SERPL-SCNC: 25 MMOL/L — SIGNIFICANT CHANGE UP (ref 22–29)
CO2 SERPL-SCNC: 27 MMOL/L — SIGNIFICANT CHANGE UP (ref 22–29)
CREAT SERPL-MCNC: 3.26 MG/DL — HIGH (ref 0.5–1.3)
CREAT SERPL-MCNC: 3.61 MG/DL — HIGH (ref 0.5–1.3)
ESTIMATED AVERAGE GLUCOSE: 97 MG/DL — SIGNIFICANT CHANGE UP (ref 68–114)
GLUCOSE BLDC GLUCOMTR-MCNC: 150 MG/DL — HIGH (ref 70–99)
GLUCOSE BLDC GLUCOMTR-MCNC: 186 MG/DL — HIGH (ref 70–99)
GLUCOSE SERPL-MCNC: 142 MG/DL — HIGH (ref 70–99)
GLUCOSE SERPL-MCNC: 169 MG/DL — HIGH (ref 70–99)
HCT VFR BLD CALC: 26.4 % — LOW (ref 39–50)
HCT VFR BLD CALC: 30.2 % — LOW (ref 39–50)
HGB BLD-MCNC: 8.7 G/DL — LOW (ref 13–17)
HGB BLD-MCNC: 9.9 G/DL — LOW (ref 13–17)
INR BLD: 1.22 RATIO — HIGH (ref 0.88–1.16)
MAGNESIUM SERPL-MCNC: 1.8 MG/DL — SIGNIFICANT CHANGE UP (ref 1.6–2.6)
MAGNESIUM SERPL-MCNC: 2 MG/DL — SIGNIFICANT CHANGE UP (ref 1.6–2.6)
MCHC RBC-ENTMCNC: 30.3 PG — SIGNIFICANT CHANGE UP (ref 27–34)
MCHC RBC-ENTMCNC: 30.6 PG — SIGNIFICANT CHANGE UP (ref 27–34)
MCHC RBC-ENTMCNC: 32.8 GM/DL — SIGNIFICANT CHANGE UP (ref 32–36)
MCHC RBC-ENTMCNC: 33 GM/DL — SIGNIFICANT CHANGE UP (ref 32–36)
MCV RBC AUTO: 92.4 FL — SIGNIFICANT CHANGE UP (ref 80–100)
MCV RBC AUTO: 93 FL — SIGNIFICANT CHANGE UP (ref 80–100)
PHOSPHATE SERPL-MCNC: 3.5 MG/DL — SIGNIFICANT CHANGE UP (ref 2.4–4.7)
PLATELET # BLD AUTO: 201 K/UL — SIGNIFICANT CHANGE UP (ref 150–400)
PLATELET # BLD AUTO: 238 K/UL — SIGNIFICANT CHANGE UP (ref 150–400)
POTASSIUM SERPL-MCNC: 3.5 MMOL/L — SIGNIFICANT CHANGE UP (ref 3.5–5.3)
POTASSIUM SERPL-MCNC: 4 MMOL/L — SIGNIFICANT CHANGE UP (ref 3.5–5.3)
POTASSIUM SERPL-SCNC: 3.5 MMOL/L — SIGNIFICANT CHANGE UP (ref 3.5–5.3)
POTASSIUM SERPL-SCNC: 4 MMOL/L — SIGNIFICANT CHANGE UP (ref 3.5–5.3)
PROT SERPL-MCNC: 5.7 G/DL — LOW (ref 6.6–8.7)
PROT SERPL-MCNC: 6.7 G/DL — SIGNIFICANT CHANGE UP (ref 6.6–8.7)
PROTHROM AB SERPL-ACNC: 14 SEC — HIGH (ref 10.6–13.6)
RBC # BLD: 2.84 M/UL — LOW (ref 4.2–5.8)
RBC # BLD: 3.27 M/UL — LOW (ref 4.2–5.8)
RBC # FLD: 17.7 % — HIGH (ref 10.3–14.5)
RBC # FLD: 17.7 % — HIGH (ref 10.3–14.5)
SODIUM SERPL-SCNC: 137 MMOL/L — SIGNIFICANT CHANGE UP (ref 135–145)
SODIUM SERPL-SCNC: 138 MMOL/L — SIGNIFICANT CHANGE UP (ref 135–145)
TROPONIN T SERPL-MCNC: 0.13 NG/ML — HIGH (ref 0–0.06)
WBC # BLD: 11.35 K/UL — HIGH (ref 3.8–10.5)
WBC # BLD: 16.08 K/UL — HIGH (ref 3.8–10.5)
WBC # FLD AUTO: 11.35 K/UL — HIGH (ref 3.8–10.5)
WBC # FLD AUTO: 16.08 K/UL — HIGH (ref 3.8–10.5)

## 2020-08-21 PROCEDURE — 33362 REPLACE AORTIC VALVE OPEN: CPT | Mod: Q0,62

## 2020-08-21 PROCEDURE — 99232 SBSQ HOSP IP/OBS MODERATE 35: CPT | Mod: 25

## 2020-08-21 PROCEDURE — 93010 ELECTROCARDIOGRAM REPORT: CPT

## 2020-08-21 PROCEDURE — 99238 HOSP IP/OBS DSCHRG MGMT 30/<: CPT

## 2020-08-21 PROCEDURE — 71045 X-RAY EXAM CHEST 1 VIEW: CPT | Mod: 26

## 2020-08-21 PROCEDURE — 71045 X-RAY EXAM CHEST 1 VIEW: CPT | Mod: 26,77

## 2020-08-21 PROCEDURE — 93355 ECHO TRANSESOPHAGEAL (TEE): CPT

## 2020-08-21 RX ORDER — NIFEDIPINE 30 MG
60 TABLET, EXTENDED RELEASE 24 HR ORAL AT BEDTIME
Refills: 0 | Status: DISCONTINUED | OUTPATIENT
Start: 2020-08-21 | End: 2020-08-22

## 2020-08-21 RX ORDER — ATORVASTATIN CALCIUM 80 MG/1
80 TABLET, FILM COATED ORAL AT BEDTIME
Refills: 0 | Status: DISCONTINUED | OUTPATIENT
Start: 2020-08-21 | End: 2020-08-22

## 2020-08-21 RX ORDER — ASPIRIN/CALCIUM CARB/MAGNESIUM 324 MG
325 TABLET ORAL DAILY
Refills: 0 | Status: DISCONTINUED | OUTPATIENT
Start: 2020-08-22 | End: 2020-08-22

## 2020-08-21 RX ORDER — SODIUM CHLORIDE 9 MG/ML
1000 INJECTION INTRAMUSCULAR; INTRAVENOUS; SUBCUTANEOUS
Refills: 0 | Status: DISCONTINUED | OUTPATIENT
Start: 2020-08-21 | End: 2020-08-22

## 2020-08-21 RX ORDER — PANTOPRAZOLE SODIUM 20 MG/1
40 TABLET, DELAYED RELEASE ORAL ONCE
Refills: 0 | Status: COMPLETED | OUTPATIENT
Start: 2020-08-21 | End: 2020-08-21

## 2020-08-21 RX ORDER — ASPIRIN/CALCIUM CARB/MAGNESIUM 324 MG
325 TABLET ORAL ONCE
Refills: 0 | Status: COMPLETED | OUTPATIENT
Start: 2020-08-21 | End: 2020-08-21

## 2020-08-21 RX ORDER — ISOSORBIDE MONONITRATE 60 MG/1
30 TABLET, EXTENDED RELEASE ORAL DAILY
Refills: 0 | Status: DISCONTINUED | OUTPATIENT
Start: 2020-08-21 | End: 2020-08-22

## 2020-08-21 RX ORDER — INSULIN LISPRO 100/ML
VIAL (ML) SUBCUTANEOUS
Refills: 0 | Status: DISCONTINUED | OUTPATIENT
Start: 2020-08-21 | End: 2020-08-22

## 2020-08-21 RX ORDER — HYDRALAZINE HCL 50 MG
50 TABLET ORAL
Refills: 0 | Status: DISCONTINUED | OUTPATIENT
Start: 2020-08-21 | End: 2020-08-22

## 2020-08-21 RX ORDER — CEFUROXIME AXETIL 250 MG
1500 TABLET ORAL EVERY 8 HOURS
Refills: 0 | Status: COMPLETED | OUTPATIENT
Start: 2020-08-21 | End: 2020-08-22

## 2020-08-21 RX ADMIN — PANTOPRAZOLE SODIUM 40 MILLIGRAM(S): 20 TABLET, DELAYED RELEASE ORAL at 16:03

## 2020-08-21 RX ADMIN — ATORVASTATIN CALCIUM 80 MILLIGRAM(S): 80 TABLET, FILM COATED ORAL at 22:51

## 2020-08-21 RX ADMIN — Medication 2: at 16:03

## 2020-08-21 RX ADMIN — Medication 50 MILLIGRAM(S): at 20:25

## 2020-08-21 RX ADMIN — Medication 325 MILLIGRAM(S): at 20:24

## 2020-08-21 RX ADMIN — Medication 100 MILLIGRAM(S): at 18:48

## 2020-08-21 RX ADMIN — Medication 60 MILLIGRAM(S): at 20:25

## 2020-08-21 RX ADMIN — CHLORHEXIDINE GLUCONATE 1 APPLICATION(S): 213 SOLUTION TOPICAL at 06:13

## 2020-08-21 RX ADMIN — CHLORHEXIDINE GLUCONATE 15 MILLILITER(S): 213 SOLUTION TOPICAL at 06:12

## 2020-08-21 RX ADMIN — Medication 50 MILLIGRAM(S): at 06:14

## 2020-08-21 RX ADMIN — Medication 90 MILLIGRAM(S): at 06:14

## 2020-08-21 RX ADMIN — ISOSORBIDE MONONITRATE 30 MILLIGRAM(S): 60 TABLET, EXTENDED RELEASE ORAL at 22:51

## 2020-08-21 NOTE — PROGRESS NOTE ADULT - PROBLEM SELECTOR PROBLEM 1
Nonrheumatic aortic valve stenosis

## 2020-08-21 NOTE — PROGRESS NOTE ADULT - PROBLEM SELECTOR PLAN 4
Monitor with daily CBC  Continue Drisdol, folic acid, epogen, MVI per nephrology  Transfuse PRBC PRN with HD per renal team
TAVR Friday, HD Monday and Thursday next week  Discharge Monday after HD if stable  D/W Dr Lilly
TAVR Friday, HD Monday and Thursday next week  Discharge Monday after HD if stable  D/W Dr Lilly
Monitor with daily CBC  Continue Drisdol, folic acid, epogen, MVI per nephrology  Transfuse PRBC PRN with HD per renal team

## 2020-08-21 NOTE — BRIEF OPERATIVE NOTE - NSICDXBRIEFPOSTOP_GEN_ALL_CORE_FT
POST-OP DIAGNOSIS:  Chronic diastolic heart failure, NYHA class 3 21-Aug-2020 11:23:43  Mariam Shannon  Severe aortic stenosis 21-Aug-2020 11:22:50  Mariam Shannon

## 2020-08-21 NOTE — PROGRESS NOTE ADULT - ASSESSMENT
86 year old male patient with medical history of Male with prior history of anomalous right coronary, nonobstructive CAD, hypertension, hyperlipidemia, history of ventricular arrhythmias, history of diastolic CHF, Aortic stenosis, carotid disease, ESRD on HD 2 times a week (Monday/friday) with persistent volume overload, prior GI bleed from ulcerated polyps and colonic AVMs, presented with SOB, RICHARDS, acute on chronic anemia in the setting of worsening symptomatic aortic stenosis. CT Surgery consult called for TAVR workup.He underwent TAVR work up and is now scheduled to go to the OR later today 8/21

## 2020-08-21 NOTE — PROGRESS NOTE ADULT - PROBLEM SELECTOR PROBLEM 3
Anemia, unspecified type
Anemia, unspecified type
Coronary artery disease involving native coronary artery of native heart without angina pectoris
Coronary artery disease involving native coronary artery of native heart without angina pectoris

## 2020-08-21 NOTE — PROGRESS NOTE ADULT - PROBLEM SELECTOR PLAN 3
Continue Imdur daily  non-obstructive CAD
Monitor  Start Iron
Monitor  Start Iron
Continue Imdur daily  non-obstructive CAD

## 2020-08-21 NOTE — CHART NOTE - NSCHARTNOTEFT_GEN_A_CORE
Transfusion Reaction Interpretation: Simple (Uncomplicated) Allergic Transfusion Reaction    Patient is an 87yo male with PMHx of ESRD on HD on M/F only, HFpEF, HTN, AS, prior GI bleed from ulcerated polyps and colonic AVMs, presenting with weakness and being admitted for acute on chronic anemia.    Patient received one unit pRBCs. Post-transfusion blood sample demonstrates that the patient is blood type A Rh(D) positive. Patient received one unit of pRBC and subsequently developed hives/urticaria. Negative for fever/chills, nausea, vomiting, hypotension, tachycardia, dyspnea. Vital signs demonstrate mild tachycardia, otherwise within normal limits. Patient was given IV fluids following the reaction. Post-transfusion workup is negative (antibody screen and direct antiglobulin test (JUMANA) negative). Signs/symptoms are consistent with a simple allergic transfusion reaction.   Allergic reactions typically present as rash, urticaria, or pruritus and are indistinguishable on examination from most food or drug allergies. Allergic reactions are IgE mediated. These reactions are usually attributed to hypersensitivity to allogeneic proteins in plasma, on leukocytes or platelets or, uncommonly, soluble allergens found in the transfused blood component. Recommend anti-histamines prior to future transfusions. Patient may be transfused as clinically indicated.

## 2020-08-21 NOTE — PROGRESS NOTE ADULT - PROBLEM SELECTOR PROBLEM 4
Anemia due to chronic kidney disease, on chronic dialysis
ESRD on dialysis
ESRD on dialysis
Anemia due to chronic kidney disease, on chronic dialysis

## 2020-08-21 NOTE — BRIEF OPERATIVE NOTE - NSICDXBRIEFPREOP_GEN_ALL_CORE_FT
PRE-OP DIAGNOSIS:  Chronic diastolic heart failure, NYHA class 3 21-Aug-2020 11:23:33  Mariam Shannon  Severe aortic stenosis 21-Aug-2020 11:22:42  Mariam Shannon

## 2020-08-21 NOTE — PROGRESS NOTE ADULT - PROBLEM SELECTOR PLAN 5
TAVR later today  Pt underwent HD yesterday  HD Monday and Thursday next week  Plan to discharge Monday after HD if stable
TAVR Friday, HD Monday and Thursday next week  Plan to discharge Monday after HD if stable  D/W Dr Lilly

## 2020-08-21 NOTE — BRIEF OPERATIVE NOTE - COMMENTS
- Commercial 23mm [employer] Judy S3 Transfemoral TAVR via Right Common Femoral Artery Cutdown.  - NCT# pending, STS/ACC TVT Registry Patient ID# [id number]  - Severe AS, Post Deployment ? PVL, ? Conduction or Rhythm Disturbances, Extubated in the OR  - [employer] Representative: Erinn Monge (valve prep & support case) - Commercial 26mm Padilla Judy S3 Transfemoral TAVR via Right Common Femoral Artery Cutdown.  - NCT# pending, STS/ACC TVT Registry Patient ID# [id number]  - Severe AS, Post Deployment mild PVL, no Conduction or Rhythm Disturbances, Extubated in the OR  - [employer] Representative: Erinn Monge (valve prep & support case)

## 2020-08-21 NOTE — PROGRESS NOTE ADULT - PROBLEM SELECTOR PROBLEM 2
Coronary artery disease involving native coronary artery of native heart without angina pectoris
Coronary artery disease involving native coronary artery of native heart without angina pectoris
Diastolic heart failure, NYHA class 3
Diastolic heart failure, NYHA class 3

## 2020-08-21 NOTE — BRIEF OPERATIVE NOTE - NSICDXBRIEFPROCEDURE_GEN_ALL_CORE_FT
PROCEDURES:  TAVR, open femoral artery approach 21-Aug-2020 11:22:22 - Commercial 26 mm Padilla Judy Ultra Transfemoral TAVR via Right Common Femoral Artery Cutdown. Mariam Shannon

## 2020-08-21 NOTE — PROGRESS NOTE ADULT - PROBLEM SELECTOR PLAN 1
Plan for TAVR 8/21, transferred to CT Surgery service  Workup complete: CTA chest, abdomen, pelvis; TTE, CXR, Preop labs  PT eval- frailty test completed: right hand 8. 8. 12 kg. Left hand 12, 12, 12 kg.  Walk test; 10 sec.
Plan for TAVR Friday  Workup essentially complete  PT eval
Plan for TAVR Friday  Workup essentially complete  PT eval
Plan for TAVR later this am  Workup complete: CTA chest, abdomen, pelvis; TTE, CXR, Preop labs  PT eval- frailty test completed: right hand 8. 8. 12 kg. Left hand 12, 12, 12 kg.  Walk test; 10 sec.

## 2020-08-21 NOTE — CHART NOTE - NSCHARTNOTEFT_GEN_A_CORE
(26 mm Judy S3) (NCT# pending) (STS/ACC TVT Registry Patient ID [id number])  Severe AS, Post Deployment ____ PVL, ____ Conduction or Rhythm Disturbances, Extubated in the OR (26 mm Judy S3) (NCT# pending) (STS/ACC TVT Registry Patient ID [id number])  Severe AS, Post Deployment mild PVL, no Conduction or Rhythm Disturbances, Extubated in the OR

## 2020-08-21 NOTE — PROGRESS NOTE ADULT - PROBLEM SELECTOR PLAN 2
Medical management
Medical management
Torsemide on non-HD weekend days, monitor lytes   Daily CXR
Torsemide on non-HD weekend days, monitor lytes   Daily CXR

## 2020-08-21 NOTE — PROGRESS NOTE ADULT - SUBJECTIVE AND OBJECTIVE BOX
Subjective:  85yo M resting comfortable, no c/o pain.      HPI:  86yoM hx ESRD on HD on M/ only, HFpEF, HTN, AS, prior GI bleed from ulcerated polyps and colonic AVMs, presenting with weakness and being admitted for acute on chronic anemia.  Daughter Vanessa Douglas who is RN manager at [employer] at bedside providing most of the hx.  Pt was recently started on HD in 2020 and receives it twice weekly due to pt preference.  He was unable to get full session of HD and his epogen injection on Monday this week due to ‘infiltration of his fistula’, but was able to get his full session of HD earlier today because his fistula became functional again.  Daughter was told ‘they removed extra fluid’ but she’s unsure of how much liters was taken off.  She was notified after HD that his blood counts were low with Hgb of 7.1 and she has noticed that ‘he looks weaker’ and hasn’t been as independent as he normally is recently.   She spoke to his nephrologist who advised that pt be taken to hospital given his recent his symptoms and worsening anemia. Pt also has been having chronic exertional dyspnea without significant relief with HD sessions that his cardiologist believes may be due to his worsening A and daughter would like inpatient work up of TAVR.   Per daughter pt was last seen in cardiologist office earlier this AM and had TTE that showed worsening AS.  Pt denies any active bleeding including melena, hematochezia, hematuria.  He also denies any fever, chills, chest pain, cough, abdominal pain.  Pt still produces urine and is on a diuretic on his non-HD days which day reports had just been increased today as per his cardiologist.  On admission, Hgb 6.8. (14 Aug 2020 23:32)          PAST MEDICAL & SURGICAL HISTORY:  GI bleeding: due to ulcerated polyps and colonic AVMs  Aortic stenosis  ESRD on dialysis: HD on  and   Risk factors for obstructive sleep apnea  Anemia  RICHARDS (dyspnea on exertion)  VT (ventricular tachycardia)  HTN (hypertension)  CAD (coronary artery disease)  Arrhythmia  AV block, 1st degree  DM (diabetes mellitus)  H/O carotid endarterectomy: Right  A-V fistula: left arm 2017  H/O angioplasty: ,  no  intervention  H/O left knee surgery  H/O circumcision: at  age  65          MEDICATIONS  (STANDING):  ascorbic acid 500 milliGRAM(s) Oral daily  aspirin enteric coated 81 milliGRAM(s) Oral daily  atorvastatin 80 milliGRAM(s) Oral at bedtime  cefuroxime  IVPB 1500 milliGRAM(s) IV Intermittent once  chlorhexidine 0.12% Liquid 15 milliLiter(s) Oral Mucosa two times a day  chlorhexidine 4% Liquid 1 Application(s) Topical two times a day  epoetin juan-epbx (RETACRIT) Injectable 78207 Unit(s) IV Push <User Schedule>  ergocalciferol 84403 Unit(s) Oral every week  hydrALAZINE 50 milliGRAM(s) Oral two times a day  isosorbide   mononitrate ER Tablet (IMDUR) 30 milliGRAM(s) Oral daily  multivitamin 1 Tablet(s) Oral daily  Nephro-pito 1 Tablet(s) Oral daily  NIFEdipine XL 60 milliGRAM(s) Oral at bedtime  NIFEdipine XL 90 milliGRAM(s) Oral daily  psyllium Powder 1 Packet(s) Oral two times a day  senna 2 Tablet(s) Oral at bedtime  sertraline 25 milliGRAM(s) Oral at bedtime  torsemide 20 milliGRAM(s) Oral <User Schedule>    MEDICATIONS  (PRN):  diphenhydrAMINE   Injectable 50 milliGRAM(s) IV Push once PRN Rash and/or Itching  polyethylene glycol 3350 17 Gram(s) Oral daily PRN Constipation          Allergies    Plavix (Hives)  Toprol-XL (Rash)    Intolerances    provide vanilla nepro TID- RD ok (Unknown)        WEIGHTS:  Daily     Daily Weight in k.8 (20 Aug 2020 13:45)  Admit Wt: Drug Dosing Weight  Height (cm): 165.1 (14 Aug 2020 18:15)  Weight (kg): 68 (14 Aug 2020 18:15)  BMI (kg/m2): 24.9 (14 Aug 2020 18:15)  BSA (m2): 1.75 (14 Aug 2020 18:15)  I&O's Summary    20 Aug 2020 07:01  -  21 Aug 2020 00:33  --------------------------------------------------------  IN: 0 mL / OUT: 400 mL / NET: -400 mL        VITAL SIGNS:  ICU Vital Signs Last 24 Hrs  T(C): 36.8 (20 Aug 2020 22:06), Max: 36.9 (20 Aug 2020 17:43)  T(F): 98.3 (20 Aug 2020 22:06), Max: 98.4 (20 Aug 2020 17:43)  HR: 96 (21 Aug 2020 00:07) (74 - 108)  BP: 169/90 (21 Aug 2020 00:07) (139/60 - 180/77)  BP(mean): 111 (21 Aug 2020 00:07) (104 - 111)  ABP: --  ABP(mean): --  RR: 24 (21 Aug 2020 00:07) (15 - 37)  SpO2: 98% (21 Aug 2020 00:07) (84% - 98%)        All laboratory results, radiology and medications reviewed.    LABS:      138  |  94<L>  |  36.0<H>  ----------------------------<  173<H>  3.7   |  25.0  |  4.59<H>    Ca    9.8      20 Aug 2020 11:16  Phos  2.8       Mg     2.1         TPro  6.7  /  Alb  4.0  /  TBili  0.4  /  DBili  x   /  AST  19  /  ALT  25  /  AlkPhos  87                                   8.5    9.68  )-----------( 235      ( 20 Aug 2020 09:06 )             26.3            Bilirubin Total, Serum: 0.4 mg/dL ( @ 11:16)          CAPILLARY BLOOD GLUCOSE                 PHYSICAL EXAM:  General:  well nourished, no acute distress  Neurology:  alert and oriented X 4, nonfocal, no gross deficits  Respiratory:  clear to auscultation bilaterally  CV:  sinus tachycardia  Abdomen:  soft, nontender, nondistended, positive bowel sounds  Extremities:  warm, well perfused, no edema +DP pulses

## 2020-08-22 ENCOUNTER — TRANSCRIPTION ENCOUNTER (OUTPATIENT)
Age: 85
End: 2020-08-22

## 2020-08-22 VITALS
OXYGEN SATURATION: 96 % | SYSTOLIC BLOOD PRESSURE: 130 MMHG | RESPIRATION RATE: 22 BRPM | DIASTOLIC BLOOD PRESSURE: 60 MMHG | HEART RATE: 90 BPM

## 2020-08-22 LAB
ALBUMIN SERPL ELPH-MCNC: 3.2 G/DL — LOW (ref 3.3–5.2)
ALP SERPL-CCNC: 72 U/L — SIGNIFICANT CHANGE UP (ref 40–120)
ALT FLD-CCNC: 18 U/L — SIGNIFICANT CHANGE UP
ANION GAP SERPL CALC-SCNC: 15 MMOL/L — SIGNIFICANT CHANGE UP (ref 5–17)
AST SERPL-CCNC: 17 U/L — SIGNIFICANT CHANGE UP
BILIRUB SERPL-MCNC: 0.4 MG/DL — SIGNIFICANT CHANGE UP (ref 0.4–2)
BUN SERPL-MCNC: 32 MG/DL — HIGH (ref 8–20)
CALCIUM SERPL-MCNC: 8.9 MG/DL — SIGNIFICANT CHANGE UP (ref 8.6–10.2)
CHLORIDE SERPL-SCNC: 98 MMOL/L — SIGNIFICANT CHANGE UP (ref 98–107)
CK SERPL-CCNC: 36 U/L — SIGNIFICANT CHANGE UP (ref 30–200)
CO2 SERPL-SCNC: 24 MMOL/L — SIGNIFICANT CHANGE UP (ref 22–29)
CREAT SERPL-MCNC: 4.26 MG/DL — HIGH (ref 0.5–1.3)
GLUCOSE BLDC GLUCOMTR-MCNC: 163 MG/DL — HIGH (ref 70–99)
GLUCOSE BLDC GLUCOMTR-MCNC: 188 MG/DL — HIGH (ref 70–99)
GLUCOSE SERPL-MCNC: 131 MG/DL — HIGH (ref 70–99)
HCT VFR BLD CALC: 24.8 % — LOW (ref 39–50)
HGB BLD-MCNC: 8 G/DL — LOW (ref 13–17)
MAGNESIUM SERPL-MCNC: 2 MG/DL — SIGNIFICANT CHANGE UP (ref 1.8–2.6)
MCHC RBC-ENTMCNC: 30.2 PG — SIGNIFICANT CHANGE UP (ref 27–34)
MCHC RBC-ENTMCNC: 32.3 GM/DL — SIGNIFICANT CHANGE UP (ref 32–36)
MCV RBC AUTO: 93.6 FL — SIGNIFICANT CHANGE UP (ref 80–100)
PLATELET # BLD AUTO: 199 K/UL — SIGNIFICANT CHANGE UP (ref 150–400)
POTASSIUM SERPL-MCNC: 3.6 MMOL/L — SIGNIFICANT CHANGE UP (ref 3.5–5.3)
POTASSIUM SERPL-SCNC: 3.6 MMOL/L — SIGNIFICANT CHANGE UP (ref 3.5–5.3)
PROT SERPL-MCNC: 5.6 G/DL — LOW (ref 6.6–8.7)
RBC # BLD: 2.65 M/UL — LOW (ref 4.2–5.8)
RBC # FLD: 18 % — HIGH (ref 10.3–14.5)
SODIUM SERPL-SCNC: 137 MMOL/L — SIGNIFICANT CHANGE UP (ref 135–145)
TROPONIN T SERPL-MCNC: 0.16 NG/ML — HIGH (ref 0–0.06)
WBC # BLD: 11.56 K/UL — HIGH (ref 3.8–10.5)
WBC # FLD AUTO: 11.56 K/UL — HIGH (ref 3.8–10.5)

## 2020-08-22 PROCEDURE — 36415 COLL VENOUS BLD VENIPUNCTURE: CPT

## 2020-08-22 PROCEDURE — 99153 MOD SED SAME PHYS/QHP EA: CPT

## 2020-08-22 PROCEDURE — C1894: CPT

## 2020-08-22 PROCEDURE — 76000 FLUOROSCOPY <1 HR PHYS/QHP: CPT

## 2020-08-22 PROCEDURE — 87635 SARS-COV-2 COVID-19 AMP PRB: CPT

## 2020-08-22 PROCEDURE — 84100 ASSAY OF PHOSPHORUS: CPT

## 2020-08-22 PROCEDURE — 83880 ASSAY OF NATRIURETIC PEPTIDE: CPT

## 2020-08-22 PROCEDURE — 86901 BLOOD TYPING SEROLOGIC RH(D): CPT

## 2020-08-22 PROCEDURE — 86923 COMPATIBILITY TEST ELECTRIC: CPT

## 2020-08-22 PROCEDURE — 86900 BLOOD TYPING SEROLOGIC ABO: CPT

## 2020-08-22 PROCEDURE — 93312 ECHO TRANSESOPHAGEAL: CPT

## 2020-08-22 PROCEDURE — 82306 VITAMIN D 25 HYDROXY: CPT

## 2020-08-22 PROCEDURE — 83550 IRON BINDING TEST: CPT

## 2020-08-22 PROCEDURE — 93010 ELECTROCARDIOGRAM REPORT: CPT

## 2020-08-22 PROCEDURE — 80053 COMPREHEN METABOLIC PANEL: CPT

## 2020-08-22 PROCEDURE — 36430 TRANSFUSION BLD/BLD COMPNT: CPT

## 2020-08-22 PROCEDURE — 83735 ASSAY OF MAGNESIUM: CPT

## 2020-08-22 PROCEDURE — 84436 ASSAY OF TOTAL THYROXINE: CPT

## 2020-08-22 PROCEDURE — 93320 DOPPLER ECHO COMPLETE: CPT

## 2020-08-22 PROCEDURE — 93325 DOPPLER ECHO COLOR FLOW MAPG: CPT

## 2020-08-22 PROCEDURE — 99285 EMERGENCY DEPT VISIT HI MDM: CPT | Mod: 25

## 2020-08-22 PROCEDURE — C1887: CPT

## 2020-08-22 PROCEDURE — 80048 BASIC METABOLIC PNL TOTAL CA: CPT

## 2020-08-22 PROCEDURE — C1769: CPT

## 2020-08-22 PROCEDURE — 82728 ASSAY OF FERRITIN: CPT

## 2020-08-22 PROCEDURE — 86880 COOMBS TEST DIRECT: CPT

## 2020-08-22 PROCEDURE — 84134 ASSAY OF PREALBUMIN: CPT

## 2020-08-22 PROCEDURE — 84484 ASSAY OF TROPONIN QUANT: CPT

## 2020-08-22 PROCEDURE — 86850 RBC ANTIBODY SCREEN: CPT

## 2020-08-22 PROCEDURE — 83970 ASSAY OF PARATHORMONE: CPT

## 2020-08-22 PROCEDURE — 93306 TTE W/DOPPLER COMPLETE: CPT

## 2020-08-22 PROCEDURE — 71275 CT ANGIOGRAPHY CHEST: CPT

## 2020-08-22 PROCEDURE — 80069 RENAL FUNCTION PANEL: CPT

## 2020-08-22 PROCEDURE — 84443 ASSAY THYROID STIM HORMONE: CPT

## 2020-08-22 PROCEDURE — 74174 CTA ABD&PLVS W/CONTRAST: CPT

## 2020-08-22 PROCEDURE — P9016: CPT

## 2020-08-22 PROCEDURE — 99233 SBSQ HOSP IP/OBS HIGH 50: CPT

## 2020-08-22 PROCEDURE — 82310 ASSAY OF CALCIUM: CPT

## 2020-08-22 PROCEDURE — 83540 ASSAY OF IRON: CPT

## 2020-08-22 PROCEDURE — 93458 L HRT ARTERY/VENTRICLE ANGIO: CPT

## 2020-08-22 PROCEDURE — 87640 STAPH A DNA AMP PROBE: CPT

## 2020-08-22 PROCEDURE — 99261: CPT

## 2020-08-22 PROCEDURE — 71045 X-RAY EXAM CHEST 1 VIEW: CPT | Mod: 26

## 2020-08-22 PROCEDURE — C8929: CPT

## 2020-08-22 PROCEDURE — 86769 SARS-COV-2 COVID-19 ANTIBODY: CPT

## 2020-08-22 PROCEDURE — 99152 MOD SED SAME PHYS/QHP 5/>YRS: CPT

## 2020-08-22 PROCEDURE — 93005 ELECTROCARDIOGRAM TRACING: CPT

## 2020-08-22 PROCEDURE — 82550 ASSAY OF CK (CPK): CPT

## 2020-08-22 PROCEDURE — 71045 X-RAY EXAM CHEST 1 VIEW: CPT

## 2020-08-22 PROCEDURE — 82962 GLUCOSE BLOOD TEST: CPT

## 2020-08-22 PROCEDURE — 85610 PROTHROMBIN TIME: CPT

## 2020-08-22 PROCEDURE — 84466 ASSAY OF TRANSFERRIN: CPT

## 2020-08-22 PROCEDURE — 87641 MR-STAPH DNA AMP PROBE: CPT

## 2020-08-22 PROCEDURE — 84480 ASSAY TRIIODOTHYRONINE (T3): CPT

## 2020-08-22 PROCEDURE — 85730 THROMBOPLASTIN TIME PARTIAL: CPT

## 2020-08-22 PROCEDURE — 86922 COMPATIBILITY TEST ANTIGLOB: CPT

## 2020-08-22 PROCEDURE — 83036 HEMOGLOBIN GLYCOSYLATED A1C: CPT

## 2020-08-22 PROCEDURE — 97163 PT EVAL HIGH COMPLEX 45 MIN: CPT

## 2020-08-22 PROCEDURE — 85027 COMPLETE CBC AUTOMATED: CPT

## 2020-08-22 PROCEDURE — C1889: CPT

## 2020-08-22 RX ADMIN — ISOSORBIDE MONONITRATE 30 MILLIGRAM(S): 60 TABLET, EXTENDED RELEASE ORAL at 11:36

## 2020-08-22 RX ADMIN — Medication 2: at 07:58

## 2020-08-22 RX ADMIN — Medication 20 MILLIGRAM(S): at 06:47

## 2020-08-22 RX ADMIN — Medication 2: at 11:35

## 2020-08-22 RX ADMIN — Medication 100 MILLIGRAM(S): at 11:35

## 2020-08-22 RX ADMIN — Medication 325 MILLIGRAM(S): at 11:36

## 2020-08-22 RX ADMIN — Medication 50 MILLIGRAM(S): at 06:47

## 2020-08-22 RX ADMIN — Medication 100 MILLIGRAM(S): at 03:00

## 2020-08-22 NOTE — DISCHARGE NOTE PROVIDER - HOSPITAL COURSE
86 year old male patient with a medical history of anomalous right coronary, nonobstructive CAD, hypertension, hyperlipidemia, history of ventricular arrhythmias, history of diastolic CHF, Aortic stenosis, carotid disease, ESRD on HD 2 times a week (Monday/Friday) with persistent volume overload, prior GI bleed from ulcerated polyps and colonic AVMs, presented with SOB, RICHARDS, acute on chronic anemia in the setting of worsening symptomatic aortic stenosis. Patient underwent TAVR work up and is now s/p Transfemoral TAVR via Right Common Femoral Artery Cutdown with commercial 26mm Padilla Judy S3 on 8/21/20. Post-deployment mild paravalvular leak noted.  No conduction or rhythm disturbances noted. Patient remains hemodynamically stable. Stable for discharge as per Dr. Lilly.         Post-procedure TTE:        Physical Examination: 86 year old male patient with a medical history of anomalous right coronary, nonobstructive CAD, hypertension, hyperlipidemia, history of ventricular arrhythmias, history of diastolic CHF, Aortic stenosis, carotid disease, ESRD on HD 2 times a week (Monday/Friday) with persistent volume overload, prior GI bleed from ulcerated polyps and colonic AVMs, presented with SOB, RICHARDS, acute on chronic anemia in the setting of worsening symptomatic aortic stenosis. Patient underwent TAVR work up and is now s/p Transfemoral TAVR via Right Common Femoral Artery Cutdown with commercial 26mm Padilla Judy S3 on 8/21/20. Post-deployment mild paravalvular leak noted.  No conduction or rhythm disturbances noted. Patient remains hemodynamically stable. Stable for discharge as per Dr. Lilly. To be discharged with MCOT device.        Post-procedure TTE:    < from: TTE Echo Complete w/ Contrast w/ Doppler (08.21.20 @ 19:33) >            Summary:     1. Left ventricular ejection fraction, by visual estimation, is 60 to 65%.     2. Normal global left ventricular systolic function.     3. There is mild concentric left ventricular hypertrophy.     4. Diastolic function indeterminate.     5. Normal RV size and function, inadequate estimation of RVSP.     6. Left atrial enlargement.     7. Moderate to severe mitral annular calcification.     8. Mitral valve mean gradient is 6.8 mmHg and PHT of 104 ms, suggestive of mild to moderate mitral stenosis.     9. Trace mitral valve regurgitation.    10. Judy-TAVR ioprosthesis in the aortic position, there is no evidence of paravalvular regurgitation. Peak velocity of 2.5 m/s, mean gradien tof 12 mmHg, HARSH (by VTI) 1.97 cm2.    11. There is no evidence of pericardial effusion.    12. Compared to the prior study from 8/16/20, interval placement of a TAVR bioprosthesis.        Reece Mc DO Electronically signed on 8/22/2020 at 9:27:08 AM        < end of copied text >                        Physical Examination:        Constitutional: NAD, well developed, well nourished    Neuro: A+O x 3, non-focal, speech clear and intact    HEENT: NC/AT, PERRL, EOMI, anicteric sclerae, oral mucosa pink and moist    Neck: supple, no JVD    CV: regular rate, regular rhythm, +S1S2, no murmurs or rub    Pulm/chest: lung sounds CTA and equal bilaterally, no accessory muscle use noted    Abd: soft, NT, ND, +BS    Ext: MARQUEZ x 4, no C/C/E, bilateral groin sites soft, c/d/i, no signs of bleeding or hematoma. DP pulses intact b/l    Skin: warm, well perfused    Psych: calm, appropriate affect

## 2020-08-22 NOTE — DISCHARGE NOTE PROVIDER - NSDCFUADDINST_GEN_ALL_CORE_FT
Choose lean meats and poultry without skin and prepare them without added saturated and trans fat.  Eat fish at least twice a week. Recent research shows that eating oily fish containing omega-3 fatty acids (for example, salmon, trout and herring) may help lower your risk of death from coronary artery disease.  Select fat-free, 1 percent fat and low-fat dairy products.  Cut back on foods containing partially hydrogenated vegetable oils to reduce trans fat in your diet.   To lower cholesterol, reduce saturated fat to no more than 5 to 6 percent of total calories. For someone eating 2,000 calories a day, that’s about 13 grams of saturated fat.  Cut back on beverages and foods with added sugars.  Choose and prepare foods with little or no salt. To lower blood pressure, aim to eat no more than 2,400 milligrams of sodium per day. Reducing daily intake to 1,500 mg is desirable because it can lower blood pressure even further.  If you drink alcohol, drink in moderation. That means one drink per day if you’re a woman and two drinks  per day if you’re a man.  Follow the American Heart Association recommendations when you eat out, and keep an eye on your portion sizes.
Please call the Cardiothoracic Surgery office at [phone] if you are experiencing any shortness of breath, chest pain, fevers or chills, drainage from the incisions, persistent nausea, vomiting or if you have any questions about your medications. If the symptoms are severe, call 911 and go to the nearest hospital. You can also call [phone] for an emergency [facility] ambulance, which will take you to the closest [facility].    If you need any assistance for making any appointments for a new consult or referral in any specialty, please call our [facility] Clinical Coordination Center at [phone].

## 2020-08-22 NOTE — DISCHARGE NOTE PROVIDER - CARE PROVIDER_API CALL
Raphael Lilly  SURGERY  [address] [place] [id number]  Phone: [phone]  Fax: [phone]  Follow Up Time:     Sally Priest)  Internal Medicine  [address] [place] [address]  Phone: [phone]  Fax: [phone]  Follow Up Time:
Jalyn Snyder  INTERNAL MEDICINE  [address] [place] [id number]  Phone: [phone]  Fax: [phone]  Follow Up Time:

## 2020-08-22 NOTE — PROGRESS NOTE ADULT - SUBJECTIVE AND OBJECTIVE BOX
[place] [facility], [facility]                                   [address] [place] [address]                                                      PHONE: [phone]                                                         FAX: [phone]  [email or web address]  ---------------------------------------------------------------------------------------------------------------------------------    Overnight events/patient complaints:  s/p tavr   no events     PAST MEDICAL & SURGICAL HISTORY:  GI bleeding: due to ulcerated polyps and colonic AVMs  Aortic stenosis  ESRD on dialysis: HD on Mondays and Fridays  Risk factors for obstructive sleep apnea  Anemia  RICHARDS (dyspnea on exertion)  VT (ventricular tachycardia)  HTN (hypertension)  CAD (coronary artery disease)  Arrhythmia  AV block, 1st degree  DM (diabetes mellitus)  H/O carotid endarterectomy: Right  A-V fistula: left arm 5/2017  H/O angioplasty: 2013,  no  intervention  H/O left knee surgery  H/O circumcision: at  age  65      Plavix (Hives)  provide vanilla nepro TID- RD ok (Unknown)  Toprol-XL (Rash)    MEDICATIONS  (STANDING):  aspirin 325 milliGRAM(s) Oral daily  atorvastatin 80 milliGRAM(s) Oral at bedtime  cefuroxime  IVPB 1500 milliGRAM(s) IV Intermittent every 8 hours  hydrALAZINE 50 milliGRAM(s) Oral two times a day  insulin lispro (HumaLOG) corrective regimen sliding scale   SubCutaneous Before meals and at bedtime  isosorbide   mononitrate ER Tablet (IMDUR) 30 milliGRAM(s) Oral daily  NIFEdipine XL 60 milliGRAM(s) Oral at bedtime  sodium chloride 0.9%. 1000 milliLiter(s) (10 mL/Hr) IV Continuous <Continuous>  torsemide 20 milliGRAM(s) Oral two times a day    MEDICATIONS  (PRN):      Vital Signs Last 24 Hrs  T(C): 37.4 (22 Aug 2020 08:00), Max: 37.4 (21 Aug 2020 08:35)  T(F): 99.3 (22 Aug 2020 08:00), Max: 99.4 (21 Aug 2020 08:35)  HR: 93 (22 Aug 2020 07:00) (54 - 93)  BP: 141/63 (22 Aug 2020 07:00) (140/63 - 157/59)  BP(mean): 91 (22 Aug 2020 07:00) (90 - 92)  RR: 19 (22 Aug 2020 07:00) (14 - 29)  SpO2: 93% (22 Aug 2020 07:00) (93% - 100%)  ICU Vital Signs Last 24 Hrs  CHRISTIANO ELIZABETH  I&O's Detail    I&O's Summary    Drug Dosing Weight  CHRISTIANO ELIZABETH      PHYSICAL EXAM:  General: Appears well developed, well nourished alert and cooperative.  HEENT: Head; normocephalic, atraumatic.  Eyes: Pupils reactive, cornea wnl.  Neck: Supple, no nodes adenopathy, no NVD or carotid bruit or thyromegaly.  CARDIOVASCULAR: Normal S1 and S2, No murmur, rub, gallop or lift.   LUNGS: No rales, rhonchi or wheeze. Normal breath sounds bilaterally.  ABDOMEN: Soft, nontender without mass or organomegaly. bowel sounds normoactive.  EXTREMITIES: No clubbing, cyanosis or edema. Distal pulses wnl.   SKIN: warm and dry with normal turgor.  NEURO: Alert/oriented x 3/normal motor exam. No pathologic reflexes.    PSYCH: normal affect.        LABS:                        8.0    11.56 )-----------( 199      ( 22 Aug 2020 02:23 )             24.8     08-22    137  |  98  |  32.0<H>  ----------------------------<  131<H>  3.6   |  24.0  |  4.26<H>    Ca    8.9      22 Aug 2020 02:23  Phos  3.5     08-21  Mg     2.0     08-22    TPro  5.6<L>  /  Alb  3.2<L>  /  TBili  0.4  /  DBili  x   /  AST  17  /  ALT  18  /  AlkPhos  72  08-22    CHRISTIANO ELIZABETH  CARDIAC MARKERS ( 22 Aug 2020 02:23 )  x     / 0.16 ng/mL / 36 U/L / x     / x      CARDIAC MARKERS ( 21 Aug 2020 13:00 )  x     / 0.13 ng/mL / 48 U/L / x     / x          PT/INR - ( 21 Aug 2020 13:00 )   PT: 14.0 sec;   INR: 1.22 ratio         PTT - ( 21 Aug 2020 13:00 )  PTT:28.2 sec    ECHO:  Summary:   1. Severely enlarged left atrium.   2. There is moderate concentric left ventricular hypertrophy.   3. Left ventricular ejection fraction, by visual estimation, is 60 to 65%. Grade III diastolic dysfunction.   4. Elevated mean left atrial pressure. (LAP = 41 mm Hg)   5. Normal right atrial size.   6. Normal right ventricular size and function.   7. Mild mitral valve regurgitation.   8. Moderate to severe aortic valve stenosis.   9. There is no evidence of pericardial effusion.    MD Rohith, RPVI Electronically signed on 8/16/2020 at 3:29:26 PM        CARDIAC CATHETERIZATION:  VENTRICLES: No left ventriculogram was performed.  CORONARY VESSELS: The coronary circulation is co-dominant.  LM:   --  Ostial LM: There was a 30 % stenosis.  LAD:   --  Mid LAD: There was a 50 % stenosis.  --  Distal LAD: There was a 60 % stenosis.  CX:   --  Proximal circumflex: There was a 20 % stenosis.  RCA:   --  Mid RCA: There was a 70 % stenosis. Superior takeoff.  COMPLICATIONS: No complications occurred during the cath lab visit.  DIAGNOSTIC IMPRESSIONS: Severe RCA disease but co-dominant vessel, does not  supply large territory.  Moderate LAD disease- similar to angiogram from 2017.  Peak to peak gardient of 55-60 mmHg- consistent with severe AS.  LVEDP 14 mmHg.  DIAGNOSTIC RECOMMENDATIONS: Medical management of CAD.  TAVR evaluation. (Plavix allergy- may have to consider using effient as  DAPT post TAVR)  Prepared and signed by  Raymond Mariscal MD  Signed 08/17/2020 19:11:56  HEMODYNAMIC TABLES  Pressures:  Baseline      ASSESSMENT AND PLAN:  In summary, CHRISTIANO ELIZABETH is an 86y Male with past medical history significant for HTN HLD none obstructive CAD HFpEF ESRD on HD severe AS normal EF s/p Right and Left cath as per above who   is now s/p  Transfemoral TAVR via Right Common Femoral Artery Cutdown with commercial 26mm Padilla Judy S3 on 8/21/20. Post-deployment mild paravalvular leak noted.    -continue current cardiac meds/dosages  -pt stable for d/c home   -will arrange outpt f/u in the office.     Thank you for allowing [facility] to participate in the care of this patient.  Please feel free to call with any questions or concerns.

## 2020-08-22 NOTE — DISCHARGE NOTE PROVIDER - PROVIDER TOKENS
PROVIDER:[TOKEN:[[id number]]],PROVIDER:[TOKEN:[[id number]]]
PROVIDER:[TOKEN:[[id number]]]

## 2020-08-22 NOTE — DISCHARGE NOTE NURSING/CASE MANAGEMENT/SOCIAL WORK - NSDCFUADDAPPT_GEN_ALL_CORE_FT
Please follow up with Dr. Lilly by calling the CT Surgery office at [phone] on the next open business day to make an appointment.    The cardiac surgery office is on the second floor of [facility]. If further directions are needed, please ask for assistance at the .    Your Care Navigator Nurse Practitioner will be in touch to see you in your home within a few days from discharge. The Follow Your Heart program can help ensure you understand your medications, discharge instructions and answer any questions you may have at that time. They are also a great source to address concerns during the day and may be reached at [phone].    Please follow up with your Cardiologist and Primary Care Physician 2-4 weeks from discharge.    Please follow up with your Nephrologist 1-2 weeks from discharge and follow your Hemodialysis schedule as previously set up (Mondays and Fridays).

## 2020-08-22 NOTE — DISCHARGE NOTE PROVIDER - NSDCCPCAREPLAN_GEN_ALL_CORE_FT
PRINCIPAL DISCHARGE DIAGNOSIS  Diagnosis: Severe aortic stenosis  Assessment and Plan of Treatment: - Keep the groin site clean and dry.  You may shower and pat the site dry.  - Watch the site for signs of redness or drainage, these should be reported to your doctor immediately.  - You will receive a wallet card about your new valve in the mail.  Please carry it with you to present to anyone who may ask if you have any medical implants.  - Be sure to inform your doctors including your dentist about your valve since you will need to take antibiotics to reduce the risk of infection before certain medical and dental procedures.  - You will be given an appointment to follow up with your doctor in approximately 4 weeks.  It is important to keep this appointment so that your new valve can be assessed.  - You may resume all your normal activities.  - You will be discharged with a heart rythm monitoring device called an "MCOT" that will be with your for 30 days.  Make sure to follow the insutrctions provided in the hospital.  Reminders: You can shower with the device. Charge the phone every other day for an hour. Change the patch every 5 days and charge your monitor at that time. Do not charge devices overnight. If you need to charge the devices sooner than recommended, you will get an alarm on the devices to indicate you to do so. Carry these devices with you at ALL times.      SECONDARY DISCHARGE DIAGNOSES  Diagnosis: Chronic diastolic heart failure, NYHA class 3  Assessment and Plan of Treatment: Take all medications as prescribed and listed on your discharge paperwork.  Please follow up with your Cardiologist and Primary Care Physician 2-4 weeks from discharge.    Diagnosis: ESRD on dialysis  Assessment and Plan of Treatment: Follow your hemodiaslysis as previously set up, 2 times per week (Monday, Friday)    Diagnosis: Anemia due to chronic kidney disease, on chronic dialysis  Assessment and Plan of Treatment: Take all medications as prescribed and listed on your discharge paperwork.  Please follow up with your Nephrologist and Primary Care Physician 2-4 weeks from discharge.

## 2020-08-22 NOTE — PROGRESS NOTE ADULT - PROVIDER SPECIALTY LIST ADULT
CT Surgery
Cardiology
Hospitalist
Nephrology
Cardiology
Hospitalist
Nephrology

## 2020-08-22 NOTE — PROGRESS NOTE ADULT - SUBJECTIVE AND OBJECTIVE BOX
[facility] DIVISION OF KIDNEY DISEASES AND HYPERTENSION -- HEMODIALYSIS NOTE  --------------------------------------------------------------------------------  Chief Complaint: ESRD/Ongoing hemodialysis requirement    24 hour events/subjective:  s/p TAVR yesterday; tolerated well  pt seen and examined; feels well        PAST HISTORY  --------------------------------------------------------------------------------  No significant changes to PMH, PSH, FHx, SHx, unless otherwise noted    ALLERGIES & MEDICATIONS  --------------------------------------------------------------------------------  Allergies    Plavix (Hives)  Toprol-XL (Rash)    Intolerances    provide vanilla nepro TID- RD ok (Unknown)    Standing Inpatient Medications  aspirin 325 milliGRAM(s) Oral daily  atorvastatin 80 milliGRAM(s) Oral at bedtime  hydrALAZINE 50 milliGRAM(s) Oral two times a day  insulin lispro (HumaLOG) corrective regimen sliding scale   SubCutaneous Before meals and at bedtime  isosorbide   mononitrate ER Tablet (IMDUR) 30 milliGRAM(s) Oral daily  NIFEdipine XL 60 milliGRAM(s) Oral at bedtime  sodium chloride 0.9%. 1000 milliLiter(s) IV Continuous <Continuous>  torsemide 20 milliGRAM(s) Oral two times a day    PRN Inpatient Medications      REVIEW OF SYSTEMS  --------------------------------------------------------------------------------  Gen: No weight changes, fatigue, fevers/chills, weakness  Skin: No rashes  Head/Eyes/Ears/Mouth: No headache  Respiratory: No dyspnea, cough,  CV: No chest pain, orthopnea  GI: No abdominal pain, diarrhea, constipation, nausea, vomiting,  MSK: No joint pain  Neuro: No dizziness/lightheadedness, weakness  Heme: No bleeding  Psych: No significant nervousness, anxiety, stress, depression    All other systems were reviewed and are negative, except as noted.    VITALS/PHYSICAL EXAM  --------------------------------------------------------------------------------  T(C): 37.4 (08-22-20 @ 08:00), Max: 37.4 (08-22-20 @ 08:00)  HR: 92 (08-22-20 @ 10:00) (54 - 98)  BP: 142/66 (08-22-20 @ 10:00) (126/60 - 142/66)  RR: 13 (08-22-20 @ 10:00) (13 - 29)  SpO2: 94% (08-22-20 @ 10:00) (93% - 100%)  Wt(kg): --  Height (cm): 165.1 (08-21-20 @ 05:15)  Weight (kg): 68 (08-21-20 @ 05:12)  BMI (kg/m2): 24.9 (08-21-20 @ 05:15)  BSA (m2): 1.75 (08-21-20 @ 05:15)      08-22-20 @ 07:01  -  08-22-20 @ 11:39  --------------------------------------------------------  IN: 150 mL / OUT: 100 mL / NET: 50 mL      Physical Exam:  	Gen: NAD, well-appearing  	HEENT: PERRL, supple neck,  	Pulm: CTA B/L  	CV: RRR, S1S2; no rub  	Abd: +BS, soft, nontender  	UE: Warm, intact strength; no asterixis  	LE: Warm, + edema  	Neuro: No focal deficits  	Psych: Normal affect and mood  	Skin: Warm, without rashes  	Vascular access: LUE AVF    LABS/STUDIES  --------------------------------------------------------------------------------              8.0    11.56 >-----------<  199      [08-22-20 @ 02:23]              24.8     137  |  98  |  32.0  ----------------------------<  131      [08-22-20 @ 02:23]  3.6   |  24.0  |  4.26        Ca     8.9     [08-22-20 @ 02:23]      Mg     2.0     [08-22-20 @ 02:23]      Phos  3.5     [08-21-20 @ 03:12]    TPro  5.6  /  Alb  3.2  /  TBili  0.4  /  DBili  x   /  AST  17  /  ALT  18  /  AlkPhos  72  [08-22-20 @ 02:23]    PT/INR: PT 14.0 , INR 1.22       [08-21-20 @ 13:00]  PTT: 28.2       [08-21-20 @ 13:00]    Troponin 0.16      [08-22-20 @ 02:23]  CK 36      [08-22-20 @ 02:23]    Iron 53, TIBC 266, %sat 20      [08-19-20 @ 06:32]  Ferritin 184      [08-19-20 @ 06:32]  PTH -- (Ca 9.6)      [08-19-20 @ 16:08]   91  Vitamin D (25OH) 26.4      [08-19-20 @ 16:08]  HbA1c 4.9      [08-09-18 @ 05:54]  TSH 1.48      [08-20-20 @ 11:16]

## 2020-08-22 NOTE — DISCHARGE NOTE PROVIDER - NSDCACTIVITY_GEN_ALL_CORE
Showering allowed/Stairs allowed/Walking - Indoors allowed/No heavy lifting/straining/Walking - Outdoors allowed
Stairs allowed/Walking - Outdoors allowed/Showering allowed/Walking - Indoors allowed/No heavy lifting/straining

## 2020-08-22 NOTE — PHYSICAL THERAPY INITIAL EVALUATION ADULT - LEVEL OF INDEPENDENCE: STAIR NEGOTIATION, REHAB EVAL
independent
supervision/**Pt did have on incidence of loss of balance when descending stairs, required assist to correct.

## 2020-08-22 NOTE — DISCHARGE NOTE PROVIDER - CARE PROVIDERS DIRECT ADDRESSES
,[email or web address],DirectAddress_Unknown
,DirectAddress_Unknown

## 2020-08-22 NOTE — DISCHARGE NOTE PROVIDER - NSDCFUADDAPPT_GEN_ALL_CORE_FT
Follow up with Dr. Snyder in one to two weeks    Restricted use with no heavy lifting of affected arm for 48 hours.  No submerging the arm in water for 48 hours.  You may start showering today.  Call your doctor for any bleeding, swelling, loss of sensation in the hand or fingers, or fingers turning blue.  If heavy bleeding or large lumps form, hold pressure at the spot and come to the Emergency Room.
Please follow up with Dr. Lilly by calling the CT Surgery office at [phone] on the next open business day to make an appointment.    The cardiac surgery office is on the second floor of [facility]. If further directions are needed, please ask for assistance at the .    Your Care Navigator Nurse Practitioner will be in touch to see you in your home within a few days from discharge. The Follow Your Heart program can help ensure you understand your medications, discharge instructions and answer any questions you may have at that time. They are also a great source to address concerns during the day and may be reached at [phone].    Please follow up with your Cardiologist and Primary Care Physician 2-4 weeks from discharge.    Please follow up with your Nephrologist 1-2 weeks from discharge and follow your Hemodialysis schedule as previously set up (Mondays and Fridays).

## 2020-08-22 NOTE — DISCHARGE NOTE PROVIDER - NSDCMRMEDTOKEN_GEN_ALL_CORE_FT
aspirin 81 mg oral delayed release tablet: 1 tab(s) orally once a day  atorvastatin 80 mg oral tablet: 1 tab(s) orally once a day (in the morning)  hydrALAZINE 50 mg oral tablet: 1 tab(s) orally 2 times a day  isosorbide mononitrate 30 mg oral tablet, extended release: 1 tab(s) orally once a day  Nephro-Thomas oral tablet: 1 tab(s) orally once a day  NIFEdipine 60 mg oral tablet, extended release: 1 tab(s) orally once a day (at bedtime)  NIFEdipine 90 mg oral tablet, extended release: 1 tab(s) orally   torsemide 20 mg oral tablet: 1 tab(s) orally 2 times a day on non HD-days aspirin 81 mg oral delayed release tablet: 1 tab(s) orally once a day  atorvastatin 80 mg oral tablet: 1 tab(s) orally once a day (in the morning)  Brilinta (ticagrelor) 60 mg oral tablet: 1 tab(s) orally 2 times a day to start on Monday 8/24  hydrALAZINE 50 mg oral tablet: 1 tab(s) orally 2 times a day  isosorbide mononitrate 30 mg oral tablet, extended release: 1 tab(s) orally once a day  Nephro-Thomas oral tablet: 1 tab(s) orally once a day  NIFEdipine 60 mg oral tablet, extended release: 1 tab(s) orally once a day (at bedtime)  NIFEdipine 90 mg oral tablet, extended release: 1 tab(s) orally   torsemide 20 mg oral tablet: 1 tab(s) orally 2 times a day on non HD-days

## 2020-08-22 NOTE — DISCHARGE NOTE NURSING/CASE MANAGEMENT/SOCIAL WORK - PATIENT PORTAL LINK FT
You can access the FollowMyHealth Patient Portal offered by [facility] by registering at the following website: [email or web address]. By joining [facility] [employer]’s FollowMyHealth portal, you will also be able to view your health information using other applications (apps) compatible with our system.

## 2020-08-22 NOTE — PHYSICAL THERAPY INITIAL EVALUATION ADULT - GENERAL OBSERVATIONS, REHAB EVAL
Pt. OOB; +monitor, IV lock; alert and cooperative
Pt received reclined in bedside chair, (+) cardiac monitor, (+) IV lock, NAD. Agreeable to PT evaluation.

## 2020-08-22 NOTE — PHYSICAL THERAPY INITIAL EVALUATION ADULT - ADDITIONAL COMMENTS
Pt. states he lives in a private home with 3 steps to enter with handrail. Wife is at home to assist as needed.
Pt. states he lives in a private home with 3 steps to enter with handrail. Wife is at home to assist as needed. Daughter is also supportive. Pt reports owning a RW, but independent without a device at baseline.

## 2020-08-22 NOTE — CHART NOTE - NSCHARTNOTEFT_GEN_A_CORE
Source: Patient [x ]  Family [ ]   other [ ]    Current Diet: Diet, Consistent Carbohydrate Clear Liquid (08-21-20 @ 12:41)  Diet, Consistent Carbohydrate/No Snacks:   DASH/TLC {Sodium & Cholesteral Restricted} (08-21-20 @ 19:03)    PO intake: Suboptimal due to constipation     Current Weight:   (8/21) 154 lbs  (8/20) 127 lbs  (8/18) 133 lbs  (8/17) 137 lbs  (8/16) 133 lbs  (8/15) 149 lbs  -Question accuracy of wts, continue to monitor. Generalized 1+ edema noted.     Pertinent Medications: MEDICATIONS  (STANDING):  aspirin 325 milliGRAM(s) Oral daily  atorvastatin 80 milliGRAM(s) Oral at bedtime  cefuroxime  IVPB 1500 milliGRAM(s) IV Intermittent every 8 hours  hydrALAZINE 50 milliGRAM(s) Oral two times a day  insulin lispro (HumaLOG) corrective regimen sliding scale   SubCutaneous Before meals and at bedtime  isosorbide   mononitrate ER Tablet (IMDUR) 30 milliGRAM(s) Oral daily  NIFEdipine XL 60 milliGRAM(s) Oral at bedtime  sodium chloride 0.9%. 1000 milliLiter(s) (10 mL/Hr) IV Continuous <Continuous>  torsemide 20 milliGRAM(s) Oral two times a day    MEDICATIONS  (PRN):    Pertinent Labs: CBC Full  -  ( 22 Aug 2020 02:23 )  WBC Count : 11.56 K/uL  RBC Count : 2.65 M/uL  Hemoglobin : 8.0 g/dL  Hematocrit : 24.8 %  Platelet Count - Automated : 199 K/uL  Mean Cell Volume : 93.6 fl  Mean Cell Hemoglobin : 30.2 pg  Mean Cell Hemoglobin Concentration : 32.3 gm/dL  Auto Neutrophil # : x  Auto Lymphocyte # : x  Auto Monocyte # : x  Auto Eosinophil # : x  Auto Basophil # : x  Auto Neutrophil % : x  Auto Lymphocyte % : x  Auto Monocyte % : x  Auto Eosinophil % : x  Auto Basophil % : x  08-22 Na137 mmol/L Glu 131 mg/dL<H> K+ 3.6 mmol/L Cr  4.26 mg/dL<H> BUN 32.0 mg/dL<H> Phos n/a   Alb 3.2 g/dL<L> PAB n/a       Skin: surgical incision     Nutrition focused physical exam peviously conducted - found signs of malnutrition [ ]absent [ x]present    Subcutaneous fat loss: [ x] Orbital fat pads region, [ ]Buccal fat region, [x ]Triceps region,  [ ]Ribs region    Muscle wasting: [x ]Temples region, [x ]Clavicle region, [x ]Shoulder region, [ ]Scapula region, [ ]Interosseous region,  [ ]thigh region, [ ]Calf region    Estimated Needs:   [x ] no change since previous assessment  [ ] recalculated:     Current Nutrition Diagnosis: Pt remains at high nutrition risk and meets criteria for moderate, chronic malnutrition related to inability to meet sufficient protein-energy in setting of ESRD on HD, advanced age, AS with plan for TAVR as evidenced by meeting <75% nutrient needs >1 month and moderate muscle/fat loss. Pt consuming eggs during assessment. Nursing reported pt has very poor po intake x 3 days due to constipation. Pt encouraged to consume adequate fluids and high fiber foods. Last BM 8/17.     Recommendations:   1) Nepro TID to optimize po intake and provide an additional 425 kcal, 19.1g protein per serving   2) Consider bowel regimen PRN  3) Encourage po intake and HBV protein  4) Monitor wts and labs    Monitoring and Evaluation:   [x ] PO intake [ x] Tolerance to diet prescription [X] Weights  [X] Follow up per protocol [X] Labs:

## 2020-08-22 NOTE — DISCHARGE NOTE PROVIDER - NSDCFUSCHEDAPPT_GEN_ALL_CORE_FT
CHRISTIANO ELIZABETH ; 09/09/2020 ; [facility]  CHRISTIANO ELIZABETH ; 09/15/2020 ; [facility] [address]  CHRISTIANO ELIZABETH ; 09/21/2020 ; [facility] [address] ; 10/26/2020 ; [facility] [address]
CHRISTIANO ELIZABETH ; 08/18/2020 ; NPP CT Surg [address]  CHRISTIANO ELIZABETH ; 09/09/2020 ; [facility]  CHRISTIANO ELIZABETH ; 09/15/2020 ; N[facility] V[facility] [address]  CHRISTIANO ELIZABETH ; 09/21/2020 ; N[facility] [address]  CHRISTIANO ELIZABETH ; 10/26/2020 ; N[facility] [address]

## 2020-08-22 NOTE — PROGRESS NOTE ADULT - REASON FOR ADMISSION
acute on chronic anemia, worsening AS

## 2020-08-24 ENCOUNTER — TRANSCRIPTION ENCOUNTER (OUTPATIENT)
Age: 85
End: 2020-08-24

## 2020-08-24 RX ORDER — TICAGRELOR 90 MG/1
1 TABLET ORAL
Qty: 60 | Refills: 1
Start: 2020-08-24 | End: 2020-10-22

## 2020-08-26 ENCOUNTER — TRANSCRIPTION ENCOUNTER (OUTPATIENT)
Age: 85
End: 2020-08-26

## 2020-08-28 ENCOUNTER — TRANSCRIPTION ENCOUNTER (OUTPATIENT)
Age: 85
End: 2020-08-28

## 2020-08-28 ENCOUNTER — OUTPATIENT (OUTPATIENT)
Dept: OUTPATIENT SERVICES | Facility: HOSPITAL | Age: 85
LOS: 1 days | Discharge: HOME | End: 2020-08-28

## 2020-08-28 ENCOUNTER — INPATIENT (INPATIENT)
Facility: HOSPITAL | Age: 85
LOS: 0 days | Discharge: HOME | DRG: 291 | End: 2020-08-29
Attending: THORACIC SURGERY (CARDIOTHORACIC VASCULAR SURGERY) | Admitting: HOSPITALIST
Payer: MEDICARE

## 2020-08-28 ENCOUNTER — APPOINTMENT (OUTPATIENT)
Dept: CARDIOTHORACIC SURGERY | Facility: CLINIC | Age: 85
End: 2020-08-28
Payer: MEDICARE

## 2020-08-28 VITALS
RESPIRATION RATE: 16 BRPM | HEIGHT: 65 IN | WEIGHT: 149.91 LBS | TEMPERATURE: 98 F | HEART RATE: 77 BPM | SYSTOLIC BLOOD PRESSURE: 155 MMHG | OXYGEN SATURATION: 99 % | DIASTOLIC BLOOD PRESSURE: 53 MMHG

## 2020-08-28 VITALS
TEMPERATURE: 98.1 F | DIASTOLIC BLOOD PRESSURE: 51 MMHG | HEART RATE: 73 BPM | RESPIRATION RATE: 12 BRPM | WEIGHT: 150 LBS | OXYGEN SATURATION: 98 % | SYSTOLIC BLOOD PRESSURE: 143 MMHG | BODY MASS INDEX: 24.11 KG/M2 | HEIGHT: 66 IN

## 2020-08-28 DIAGNOSIS — D63.1 ANEMIA IN CHRONIC KIDNEY DISEASE: ICD-10-CM

## 2020-08-28 DIAGNOSIS — Z95.2 PRESENCE OF PROSTHETIC HEART VALVE: ICD-10-CM

## 2020-08-28 DIAGNOSIS — Z98.890 OTHER SPECIFIED POSTPROCEDURAL STATES: Chronic | ICD-10-CM

## 2020-08-28 DIAGNOSIS — Z98.62 PERIPHERAL VASCULAR ANGIOPLASTY STATUS: Chronic | ICD-10-CM

## 2020-08-28 DIAGNOSIS — I77.0 ARTERIOVENOUS FISTULA, ACQUIRED: Chronic | ICD-10-CM

## 2020-08-28 DIAGNOSIS — D64.9 ANEMIA, UNSPECIFIED: ICD-10-CM

## 2020-08-28 LAB
ANION GAP SERPL CALC-SCNC: 18 MMOL/L — HIGH (ref 5–17)
APTT BLD: 25.6 SEC — LOW (ref 27.5–35.5)
BASOPHILS # BLD AUTO: 0.08 K/UL — SIGNIFICANT CHANGE UP (ref 0–0.2)
BASOPHILS NFR BLD AUTO: 1.2 % — SIGNIFICANT CHANGE UP (ref 0–2)
BUN SERPL-MCNC: 60 MG/DL — HIGH (ref 8–20)
CALCIUM SERPL-MCNC: 8.9 MG/DL — SIGNIFICANT CHANGE UP (ref 8.6–10.2)
CHLORIDE SERPL-SCNC: 96 MMOL/L — LOW (ref 98–107)
CO2 SERPL-SCNC: 25 MMOL/L — SIGNIFICANT CHANGE UP (ref 22–29)
CREAT SERPL-MCNC: 5.51 MG/DL — HIGH (ref 0.5–1.3)
EOSINOPHIL # BLD AUTO: 0.17 K/UL — SIGNIFICANT CHANGE UP (ref 0–0.5)
EOSINOPHIL NFR BLD AUTO: 2.5 % — SIGNIFICANT CHANGE UP (ref 0–6)
GLUCOSE SERPL-MCNC: 209 MG/DL — HIGH (ref 70–99)
HCT VFR BLD CALC: 23 % — LOW (ref 39–50)
HGB BLD-MCNC: 7.4 G/DL — LOW (ref 13–17)
IMM GRANULOCYTES NFR BLD AUTO: 0.9 % — SIGNIFICANT CHANGE UP (ref 0–1.5)
INR BLD: 1.11 RATIO — SIGNIFICANT CHANGE UP (ref 0.88–1.16)
LYMPHOCYTES # BLD AUTO: 0.87 K/UL — LOW (ref 1–3.3)
LYMPHOCYTES # BLD AUTO: 12.8 % — LOW (ref 13–44)
MCHC RBC-ENTMCNC: 30.3 PG — SIGNIFICANT CHANGE UP (ref 27–34)
MCHC RBC-ENTMCNC: 32.2 GM/DL — SIGNIFICANT CHANGE UP (ref 32–36)
MCV RBC AUTO: 94.3 FL — SIGNIFICANT CHANGE UP (ref 80–100)
MONOCYTES # BLD AUTO: 0.77 K/UL — SIGNIFICANT CHANGE UP (ref 0–0.9)
MONOCYTES NFR BLD AUTO: 11.3 % — SIGNIFICANT CHANGE UP (ref 2–14)
NEUTROPHILS # BLD AUTO: 4.87 K/UL — SIGNIFICANT CHANGE UP (ref 1.8–7.4)
NEUTROPHILS NFR BLD AUTO: 71.3 % — SIGNIFICANT CHANGE UP (ref 43–77)
PLATELET # BLD AUTO: 220 K/UL — SIGNIFICANT CHANGE UP (ref 150–400)
POTASSIUM SERPL-MCNC: 3.9 MMOL/L — SIGNIFICANT CHANGE UP (ref 3.5–5.3)
POTASSIUM SERPL-SCNC: 3.9 MMOL/L — SIGNIFICANT CHANGE UP (ref 3.5–5.3)
PROTHROM AB SERPL-ACNC: 12.8 SEC — SIGNIFICANT CHANGE UP (ref 10.6–13.6)
RBC # BLD: 2.44 M/UL — LOW (ref 4.2–5.8)
RBC # FLD: 16.6 % — HIGH (ref 10.3–14.5)
SARS-COV-2 RNA SPEC QL NAA+PROBE: SIGNIFICANT CHANGE UP
SODIUM SERPL-SCNC: 139 MMOL/L — SIGNIFICANT CHANGE UP (ref 135–145)
WBC # BLD: 6.82 K/UL — SIGNIFICANT CHANGE UP (ref 3.8–10.5)
WBC # FLD AUTO: 6.82 K/UL — SIGNIFICANT CHANGE UP (ref 3.8–10.5)

## 2020-08-28 PROCEDURE — 93010 ELECTROCARDIOGRAM REPORT: CPT

## 2020-08-28 PROCEDURE — 99285 EMERGENCY DEPT VISIT HI MDM: CPT

## 2020-08-28 PROCEDURE — 99223 1ST HOSP IP/OBS HIGH 75: CPT

## 2020-08-28 PROCEDURE — 99215 OFFICE O/P EST HI 40 MIN: CPT

## 2020-08-28 RX ORDER — ASPIRIN/CALCIUM CARB/MAGNESIUM 324 MG
81 TABLET ORAL DAILY
Refills: 0 | Status: DISCONTINUED | OUTPATIENT
Start: 2020-08-28 | End: 2020-08-29

## 2020-08-28 RX ORDER — NIFEDIPINE 30 MG
90 TABLET, EXTENDED RELEASE 24 HR ORAL DAILY
Refills: 0 | Status: DISCONTINUED | OUTPATIENT
Start: 2020-08-28 | End: 2020-08-29

## 2020-08-28 RX ORDER — TICAGRELOR 90 MG/1
60 TABLET ORAL EVERY 12 HOURS
Refills: 0 | Status: DISCONTINUED | OUTPATIENT
Start: 2020-08-28 | End: 2020-08-29

## 2020-08-28 RX ORDER — DOCUSATE SODIUM 100 MG/1
100 CAPSULE ORAL TWICE DAILY
Qty: 60 | Refills: 0 | Status: COMPLETED | COMMUNITY
Start: 2020-02-25 | End: 2020-08-28

## 2020-08-28 RX ORDER — DIPHENHYDRAMINE HCL 50 MG
50 CAPSULE ORAL DAILY
Refills: 0 | Status: DISCONTINUED | OUTPATIENT
Start: 2020-08-28 | End: 2020-08-29

## 2020-08-28 RX ORDER — CALCITRIOL 0.25 UG/1
0.25 CAPSULE, LIQUID FILLED ORAL
Qty: 90 | Refills: 3 | Status: COMPLETED | COMMUNITY
Start: 2017-05-26 | End: 2020-08-28

## 2020-08-28 RX ORDER — DARBEPOETIN ALFA 100 UG/.5ML
100 INJECTION, SOLUTION INTRAVENOUS; SUBCUTANEOUS
Refills: 0 | Status: COMPLETED | COMMUNITY
Start: 2018-09-11 | End: 2020-08-28

## 2020-08-28 RX ORDER — HYDRALAZINE HCL 50 MG
50 TABLET ORAL DAILY
Refills: 0 | Status: DISCONTINUED | OUTPATIENT
Start: 2020-08-28 | End: 2020-08-29

## 2020-08-28 RX ORDER — ISOSORBIDE MONONITRATE 60 MG/1
30 TABLET, EXTENDED RELEASE ORAL DAILY
Refills: 0 | Status: DISCONTINUED | OUTPATIENT
Start: 2020-08-28 | End: 2020-08-29

## 2020-08-28 RX ORDER — NIFEDIPINE 30 MG
60 TABLET, EXTENDED RELEASE 24 HR ORAL AT BEDTIME
Refills: 0 | Status: DISCONTINUED | OUTPATIENT
Start: 2020-08-28 | End: 2020-08-29

## 2020-08-28 RX ORDER — ATORVASTATIN CALCIUM 80 MG/1
80 TABLET, FILM COATED ORAL AT BEDTIME
Refills: 0 | Status: DISCONTINUED | OUTPATIENT
Start: 2020-08-28 | End: 2020-08-29

## 2020-08-28 RX ORDER — ACETAMINOPHEN 500 MG
650 TABLET ORAL ONCE
Refills: 0 | Status: COMPLETED | OUTPATIENT
Start: 2020-08-28 | End: 2020-08-28

## 2020-08-28 RX ORDER — CLONIDINE HYDROCHLORIDE 0.3 MG/1
TABLET ORAL
Refills: 0 | Status: COMPLETED | COMMUNITY
End: 2020-08-28

## 2020-08-28 RX ADMIN — Medication 650 MILLIGRAM(S): at 18:15

## 2020-08-28 RX ADMIN — Medication 50 MILLIGRAM(S): at 17:42

## 2020-08-28 RX ADMIN — ATORVASTATIN CALCIUM 80 MILLIGRAM(S): 80 TABLET, FILM COATED ORAL at 23:44

## 2020-08-28 RX ADMIN — Medication 60 MILLIGRAM(S): at 23:45

## 2020-08-28 RX ADMIN — Medication 650 MILLIGRAM(S): at 17:42

## 2020-08-28 RX ADMIN — TICAGRELOR 60 MILLIGRAM(S): 90 TABLET ORAL at 23:45

## 2020-08-28 NOTE — CHART NOTE - NSCHARTNOTEFT_GEN_A_CORE
85 y/o Male w/ h/o severe AS s/p TAVR 8/21/20, CKD on HD, and anemia of chronic disease. Was asked to see patient in ER. Patient recently seen in office today by Dr. Lilly with no issues. Upon discharge on 8/22 patient's hemoglobin was 8.0. Patient had HD on Monday 8/24 and labs were drawn on that date. On 8/28 after patient seen in Dr. Lilly's office, labs, including CBC from 8/24 resulted showing hemoglobin level of 7.8. Patient was asked to report to ER. To be admitted under medicine for 1x transfusion of PRBC and 1x HD in hospital before discharge. Upon admission patient's hemoglobin 7.4. Patient seen and examined bedside, no complaints. States that he has felt well since he has been home from the hospital. Denies any SOB, CP, HA, dizziness, faintness, fatigue, nausea, vomiting, diarrhea, leg swelling. No CT surgery intervention at this time. Of note, patient's daughter states that MCOT device has been malfunctioning as it has been unable to hold charge despite trying different chargers. Informed Structural Heart RN Stacy who will troubleshoot the device while patient is in hospital.    Constitutional: NAD, well developed, well nourished  Neuro: A+O x 3, non-focal, speech clear and intact  HEENT: NC/AT, PERRL, EOMI, anicteric sclerae, oral mucosa pink and moist  Neck: supple, no JVD  CV: regular rate, regular rhythm, +S1S2, 2/6 systolic murmur noted throughout  Pulm/chest: lung sounds CTA and equal bilaterally, no accessory muscle use noted  Abd: soft, NT, ND, +BS  Ext: MARQUEZ x 4, no C/C/E  Skin: warm, well perfused  Psych: calm, appropriate affect

## 2020-08-28 NOTE — ED ADULT NURSE NOTE - OBJECTIVE STATEMENT
pt awake and alert, sent by nephrologist for dialysis and blood transfusions. as per patient the dialysis center is unable to give blood transfusion. even and unlabored resps present, skin warm dry and intact, pt denies any medical complaints. nephrology here at bedside on this RN exam for dialysis orders. pt states he feels fine. pt awake and alert, sent by nephrologist for dialysis and blood transfusions. as per patient the dialysis center is unable to give blood transfusion. even and unlabored resps present, skin warm dry and intact, pt denies any medical complaints. nephrology here at bedside on this RN exam for dialysis orders. pt states he feels fine. had TAVR last week.

## 2020-08-28 NOTE — PROGRESS NOTE ADULT - SUBJECTIVE AND OBJECTIVE BOX
?Acute transfusion reactions (ATRs) range from bothersome yet clinically benign to life-threatening reactions. The nature of the reaction may not be immediately apparent, because many reactions begin with nonspecific symptoms such as fever or chills. Potential types of reactions are listed above.   ?The frequency of reactions varies from relatively common reactions, such as urticaria and febrile nonhemolytic reactions, to rare complications including anaphylaxis, fatal intravascular hemolysis due to ABO incompatibility, and sepsis  ?The potential of an ATR should be considered in any patient who develops adverse signs and symptoms during, or within 24 hours after completion of a transfusion. Many of the most severe reactions occur within the first 15 minutes of transfusion.  ?A patient with a suspected ATR should have the following .  •Transfusion stopped immediately  •Patent intravenous line  •Confirmation of the correct product  •Clinical assessment  •Transfusion service involvement  ?The initial patient assessment and relevant aspects of the clinical history are used to determine the most likely reaction(s)  •Fever/chills suggest an acute hemolytic transfusion reaction (AHTR), a septic transfusion reaction, transfusion-related acute lung injury (TRALI), or a febrile nonhemolytic transfusion reaction (FNHTR). AHTR, sepsis, and TRALI are potentially fatal; FNHTR is less serious but is for the most part a diagnosis of exclusion. Fever may also be a component of the patient's underlying illness.   •Respiratory symptomatology characterizes TACO, TRALI, and anaphylaxis.TACO and TRALI may be difficult to differentiate because they both present with pulmonary edema   •A significant drop in blood pressure (eg, by >20 mmHg) is characteristic of AHTR, TRALI, and sepsis; hypotension may also be due to bleeding rather than a transfusion reaction. Primary hypotensive reactions (thought to be due to vasoactive kinins) may also be responsible, although these are very rare.   ?Laboratory samples for evaluating a suspected transfusion reaction should be accompanied by information regarding the patient history and the reaction. The hospital blood bank or laboratory will do a clerical check, repeat ABO testing, a visual check for hemolysis, and a direct antiglobulin (Marin) test (JUMANA). Additional testing and preliminary management will depend on the type of reaction suspected.
[facility] DIVISION OF KIDNEY DISEASES AND HYPERTENSION -- HEMODIALYSIS NOTE  --------------------------------------------------------------------------------  Chief Complaint: ESRD/Ongoing hemodialysis requirement    24 hour events/subjective:        PAST HISTORY  --------------------------------------------------------------------------------  No significant changes to PMH, PSH, FHx, SHx, unless otherwise noted    ALLERGIES & MEDICATIONS  --------------------------------------------------------------------------------  Allergies    Plavix (Hives)  Toprol-XL (Rash)    Intolerances    provide vanilla nepro TID- RD ok (Unknown)    Standing Inpatient Medications  aspirin  chewable 81 milliGRAM(s) Oral daily  atorvastatin 80 milliGRAM(s) Oral at bedtime  hydrALAZINE 50 milliGRAM(s) Oral daily  isosorbide   mononitrate ER Tablet (IMDUR) 30 milliGRAM(s) Oral daily  Nephro-pito 1 Tablet(s) Oral daily  NIFEdipine XL 90 milliGRAM(s) Oral daily  NIFEdipine XL 60 milliGRAM(s) Oral at bedtime  ticagrelor 60 milliGRAM(s) Oral every 12 hours    PRN Inpatient Medications  diphenhydrAMINE 50 milliGRAM(s) Oral daily PRN      REVIEW OF SYSTEMS  --------------------------------------------------------------------------------  Gen: No weight changes, fatigue, fevers/chills, weakness  Skin: No rashes  Head/Eyes/Ears/Mouth: No headache; Normal hearing; Normal vision w/o blurriness; No sinus pain/discomfort, sore throat  Respiratory: No dyspnea, cough, wheezing, hemoptysis  CV: No chest pain, PND, orthopnea  GI: No abdominal pain, diarrhea, constipation, nausea, vomiting, melena, hematochezia  : No increased frequency, dysuria, hematuria, nocturia  MSK: No joint pain/swelling; no back pain; no edema  Neuro: No dizziness/lightheadedness, weakness, seizures, numbness, tingling  Heme: No easy bruising or bleeding  Endo: No heat/cold intolerance  Psych: No significant nervousness, anxiety, stress, depression    All other systems were reviewed and are negative, except as noted.    VITALS/PHYSICAL EXAM  --------------------------------------------------------------------------------  T(C): 37.2 (08-29-20 @ 05:15), Max: 37.2 (08-29-20 @ 05:15)  HR: 66 (08-29-20 @ 05:15) (66 - 77)  BP: 174/65 (08-29-20 @ 05:15) (142/59 - 185/60)  RR: 17 (08-29-20 @ 05:15) (16 - 18)  SpO2: 96% (08-29-20 @ 05:15) (96% - 99%)  Wt(kg): --  Height (cm): 165.1 (08-28-20 @ 14:08)  Weight (kg): 68 (08-28-20 @ 14:08)  BMI (kg/m2): 24.9 (08-28-20 @ 14:08)  BSA (m2): 1.75 (08-28-20 @ 14:08)      08-28-20 @ 07:01  -  08-29-20 @ 06:38  --------------------------------------------------------  IN: 240 mL / OUT: 1200 mL / NET: -960 mL      Physical Exam:  	Gen: NAD, well-appearing  	HEENT: PERRL, supple neck, clear oropharynx  	Pulm: CTA B/L  	CV: RRR, S1S2; no rub  	Back: No spinal or CVA tenderness; no sacral edema  	Abd: +BS, soft, nontender/nondistended  	: No suprapubic tenderness  	UE: Warm, FROM, no clubbing, intact strength; no edema; no asterixis  	LE: Warm, FROM, no clubbing, intact strength; no edema  	Neuro: No focal deficits, intact gait  	Psych: Normal affect and mood  	Skin: Warm, without rashes  	Vascular access:    LABS/STUDIES  --------------------------------------------------------------------------------              7.4    6.82  >-----------<  220      [08-28-20 @ 14:47]              23.0     139  |  96  |  60.0  ----------------------------<  209      [08-28-20 @ 14:47]  3.9   |  25.0  |  5.51        Ca     8.9     [08-28-20 @ 14:47]      PT/INR: PT 12.8 , INR 1.11       [08-28-20 @ 14:47]  PTT: 25.6       [08-28-20 @ 14:47]      Iron 53, TIBC 266, %sat 20      [08-19-20 @ 06:32]  Ferritin 184      [08-19-20 @ 06:32]  PTH -- (Ca 9.6)      [08-19-20 @ 16:08]   91  Vitamin D (25OH) 26.4      [08-19-20 @ 16:08]  HbA1c 4.9      [08-09-18 @ 05:54]  TSH 1.48      [08-20-20 @ 11:16]

## 2020-08-28 NOTE — ED ADULT NURSE REASSESSMENT NOTE - NS ED NURSE REASSESS COMMENT FT1
pt wit no complaints, report given to dialysis RN at this time, even and unlabored resps present. IV remains patent, VSS at this time.

## 2020-08-28 NOTE — PROGRESS NOTE ADULT - ASSESSMENT
Completed 3  hrs and 15 minutes of dialysis ,     2 units of PRBC given, No Urticaria,    Net fluid loss 900 cc.       Vital signs stable post dialysis.     Endorsed  to RN
Non-life-threatening reactions :  Urticarial transfusion reaction (UTR) — Urticarial reactions are associated with hives but no other allergic findings (ie, no wheezing, angioedema, hypotension). The most common cause is an antigen-antibody interaction that occurs between patient and the product; commonly implicated antigens include a number of donor serum proteins. UTR is not a contraindication to continuing the transfusion as long as it is clear there are no other allergic symptoms. Antihistamines can be given, but are not indicated prophylactically.   UTRs are common.

## 2020-08-28 NOTE — ED ADULT NURSE NOTE - PMH
Anemia    Aortic stenosis    Arrhythmia    AV block, 1st degree    CAD (coronary artery disease)    DM (diabetes mellitus)    RICHARDS (dyspnea on exertion)    ESRD on dialysis  HD on Mondays and Fridays  GI bleeding  due to ulcerated polyps and colonic AVMs  HTN (hypertension)    Risk factors for obstructive sleep apnea    VT (ventricular tachycardia)

## 2020-08-28 NOTE — DATA REVIEWED
[FreeTextEntry1] : Echocardiogram performed on 08/21/2020 at [facility] revealed\par -LVEF 60-65%\par -there is mild concentric left ventricular hypertrophy.\par -Diastolic function inadequate estimation of RVSP\par -Left Atrial Enlargement \par -moderate to severe mitral annular calcification\par -mitral valve gradient is 6.8 mmHg, and PHT of 104 ms, suggestive of mild to moderate mitral stenosis\par -trace mitral valve regurgitation\par pham TAVR isoprosthesis in the aortic position, there is no evidence of paravalvular regurgitation. peak velocity of 2.5 m/s, mean pf 12 mmHg HARSH 1.97 cm2\par -There is no evidence of pericardial effusion\par \par cardiac Catherization performed on 08/17/2020 revealed\par severe RCA disease but co-dominant vessel does not supply larger territory\par moderate LAD disease similar to angiogram form 2017\par peak to peak gradient of 55-60 mmHg consistent with severe AS\par LVEDP 14 mmHg\par \par \par

## 2020-08-28 NOTE — H&P ADULT - NSHPPHYSICALEXAM_GEN_ALL_CORE
CONSTITUTIONAL: NAD  EYES: +EOMI  CARDIAC: Regular rate, regular rhythm.  normal +S1, S2.   RESPIRATORY: Clear to auscultation bilaterally, no wheezes noted  GASTROINTESTINAL: Abdomen soft, non-tender, no guarding.  NEUROLOGICAL: awake, alert, grossly non-focal  SKIN: warm, dry, no rashes noted

## 2020-08-28 NOTE — ED ADULT NURSE REASSESSMENT NOTE - NS ED NURSE REASSESS COMMENT FT1
pt awake and alert, awaiting Covid result for dialysis and admission. pt in no distress, even and unlabored resps present. offers no complaints at this time. VSS at this time.

## 2020-08-28 NOTE — H&P ADULT - ASSESSMENT
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 Outpt labs, Hgb 7, sent for transfusion with HD.     Acute on chronic anemia   -in setting of ESRD  -transfuse 2u PRBC with HD today   -nephrology following    AS s/p TAVR (8/21/20)   CAD, HTN  -cont home brillinta, asa, statin  -cont home meds     HFpEF  -cont torsemide on non-HD days    Moderate protein calorie nutrition  -nepro supplement    Pt's primary contact at this time, daughter Erinn Izquierdo [phone] (cell)  D/w CTSx, transfer pt to Dr. Lilly's service

## 2020-08-28 NOTE — CONSULT NOTE ADULT - SUBJECTIVE AND OBJECTIVE BOX
[facility] DIVISION OF KIDNEY DISEASES AND HYPERTENSION -- INITIAL CONSULT NOTE  --------------------------------------------------------------------------------  HPI:  86M with history of ESRD on HD, MF, CHF, HTN, recent TAVR, prior GIB from AVM/ulcerated polyps, here for worsening anemia. HD unit stated HGB was 7; sent to [facility] for transfusion and dialysis due to fatigue and symptomatic anemia. No other complaints; lying in bed in NAD. Daughter Erinn Izquierdo at bedside.     PAST HISTORY  --------------------------------------------------------------------------------  PAST MEDICAL & SURGICAL HISTORY:  GI bleeding: due to ulcerated polyps and colonic AVMs  Aortic stenosis  ESRD on dialysis: HD on Mondays and Fridays  Risk factors for obstructive sleep apnea  Anemia  RICHARDS (dyspnea on exertion)  VT (ventricular tachycardia)  HTN (hypertension)  CAD (coronary artery disease)  Arrhythmia  AV block, 1st degree  DM (diabetes mellitus)  H/O carotid endarterectomy: Right  A-V fistula: left arm 5/2017  H/O angioplasty: 2013,  no  intervention  H/O left knee surgery  H/O circumcision: at  age  65    FAMILY HISTORY:  FH: type 2 diabetes  Family history of premature CAD  Family history of lung cancer (Grandparent)  Family history of cancer (Grandparent)    PAST SOCIAL HISTORY:    ALLERGIES & MEDICATIONS  --------------------------------------------------------------------------------  Allergies    Plavix (Hives)  Toprol-XL (Rash)    Intolerances    provide vanilla nepro TID- RD ok (Unknown)    Standing Inpatient Medications    PRN Inpatient Medications      REVIEW OF SYSTEMS  --------------------------------------------------------------------------------  Gen: No weight changes, + fatigue  Skin: No rashes  Head/Eyes/Ears/Mouth: No headache; Normal hearing; Normal vision w/o blurriness; No sinus pain/discomfort, sore throat  Respiratory: No dyspnea, cough, wheezing, hemoptysis  CV: No chest pain, PND, orthopnea  GI: No abdominal pain, diarrhea, constipation, nausea, vomiting, melena, hematochezia  : No increased frequency, dysuria, hematuria, nocturia  MSK: No joint pain/swelling; no back pain; no edema  Neuro: No dizziness/lightheadedness, weakness, seizures, numbness, tingling  Heme: No easy bruising or bleeding  Endo: No heat/cold intolerance  Psych: No significant nervousness, anxiety, stress, depression    All other systems were reviewed and are negative, except as noted.    VITALS/PHYSICAL EXAM  --------------------------------------------------------------------------------  T(C): 36.9 (08-28-20 @ 14:08), Max: 36.9 (08-28-20 @ 14:08)  HR: 77 (08-28-20 @ 14:08) (77 - 77)  BP: 155/53 (08-28-20 @ 14:08) (155/53 - 155/53)  RR: 16 (08-28-20 @ 14:08) (16 - 16)  SpO2: 99% (08-28-20 @ 14:08) (99% - 99%)  Wt(kg): --  Height (cm): 165.1 (08-28-20 @ 14:08)  Weight (kg): 68 (08-28-20 @ 14:08)  BMI (kg/m2): 24.9 (08-28-20 @ 14:08)  BSA (m2): 1.75 (08-28-20 @ 14:08)      Physical Exam:  	Gen: NAD, pale  	HEENT: PERRL, supple neck, clear oropharynx  	Pulm: CTA B/L  	CV: RRR, S1S2; no rub  	Back: No spinal or CVA tenderness; no sacral edema  	Abd: +BS, soft, nontender/nondistended  	: No suprapubic tenderness  	UE: Warm, FROM, no clubbing, intact strength; no edema; no asterixis  	LE: Warm, FROM, no clubbing, intact strength; no edema  	Neuro: No focal deficits, intact gait  	Psych: Normal affect and mood  	Skin: Warm, without rashes  	Vascular access: L AVF +BT    LABS/STUDIES  --------------------------------------------------------------------------------                Creatinine Trend:  SCr 4.26 [08-22 @ 02:23]  SCr 3.61 [08-21 @ 13:00]  SCr 3.26 [08-21 @ 03:12]  SCr 4.59 [08-20 @ 11:16]  SCr 2.79 [08-19 @ 06:32]    Urinalysis - [06-08-20 @ 13:12]      Color Yellow / Appearance Slightly Turbid / SG 1.010 / pH 5.0      Gluc 250 / Ketone Negative  / Bili Negative / Urobili Negative       Blood Negative / Protein 100 / Leuk Est Negative / Nitrite Negative      RBC 0-2 / WBC 0-2 / Hyaline  / Gran  / Sq Epi  / Non Sq Epi Occasional / Bacteria Few      Iron 53, TIBC 266, %sat 20      [08-19-20 @ 06:32]  Ferritin 184      [08-19-20 @ 06:32]  PTH -- (Ca 9.6)      [08-19-20 @ 16:08]   91  Vitamin D (25OH) 26.4      [08-19-20 @ 16:08]  HbA1c 4.9      [08-09-18 @ 05:54]  TSH 1.48      [08-20-20 @ 11:16]    HBsAb <3.0      [06-25-20 @ 02:07]  HBsAg Nonreact      [06-25-20 @ 02:07]  HBcAb Nonreact      [06-25-20 @ 02:07]  HCV 0.11, Nonreact      [06-25-20 @ 02:07]

## 2020-08-28 NOTE — CONSULT LETTER
[Dear  ___] : Dear  [unfilled], [Courtesy Letter:] : I had the pleasure of seeing your patient, [unfilled], in my office today. [Please see my note below.] : Please see my note below. [Sincerely,] : Sincerely, [Referral Closing:] : Thank you very much for seeing this patient.  If you have any questions, please do not hesitate to contact me. [FreeTextEntry2] : Dr. Mango Peterson\par [address] [place] [address]\[place], [place], [address]\par P: [phone] [FreeTextEntry3] : Raphael Lilly MD\par  of Cardiovascular & Thoracic Surgery\par Associated Professor \par Cardiovascular & Thoracic Surgery\par [employer] [facility] [employer]\par \par [facility] \par [address] [facility] \[place] [address]\par Tel   [phone]\par Fax  [phone]\par

## 2020-08-28 NOTE — ED ADULT TRIAGE NOTE - CHIEF COMPLAINT QUOTE
Pt had TAVR done last week. Dr Steele sent pt in for blood transfusion and dialysis. Also would like consult with Dr Baker. Pt states he feels fine.

## 2020-08-28 NOTE — ED CLERICAL - NS ED CLERK NOTE PRE-ARRIVAL INFORMATION; ADDITIONAL PRE-ARRIVAL INFORMATION
This patient is enrolled in the Follow Your Heart program and has undergone a cardiac surgery procedure and has active care navigation. This patient can be followed up by the care navigation team within 24 hours. To arrange close follow-up or to obtain additional clinical information about this patient, please call the contact number above. Please call the cardiac surgery team once patient is registered at [phone] for consultation PRIOR to disposition decision.  The patient recently underwent a cardiac surgery procedure and the team can assist in acute medical management.

## 2020-08-28 NOTE — H&P ADULT - HISTORY OF PRESENT ILLNESS
86y/oM PMH ESRD on HD (MF only), severe AS s/p TAVR (8/21/20), HFpEF, HTN, anemia of chornic dx, non-obstructive CAD, hx GIB from ulcerated polyps and colonic AVMs, 86y/oM PMH ESRD on HD (MF only), severe AS s/p TAVR (8/21/20), HFpEF, HTN, anemia of chornic dx, non-obstructive CAD, hx GIB from ulcerated polyps and colonic AVMs, sent to ER for worsening anemia. Outpt labs, Hgb 7, sent for transfusion with HD. Daughter, Erinn Izquierdo at bedside. Pt feeling well overall. Denies BRBPR, melena, abd pain, N/V, dizziness, CP, SOB.

## 2020-08-28 NOTE — HISTORY OF PRESENT ILLNESS
[FreeTextEntry1] : Mr. King is a 86 year old male referred by Dr.Michael SKYLAR Peterson here today for initial evaluation of Severe Aortic Stenosis. He has a past medical history significant for Anomalous right coronary artery, non-obstructive CAD, Hypertension, Hyperlipidemia, HFpEF, Moderate AS, Type 2 Diabetes Mellitus, Carotid Disease,, CKD stage 5 ( on HD mondays and fridays, nephrologist Dr. Avendano), loop recorder (medtronic), with LUE Fistula and Peripheral Artery Disease and past surgical history of Carotid endarterectomy.\par \par He was admitted to [facility] on 08/14/2020

## 2020-08-28 NOTE — CONSULT NOTE ADULT - ASSESSMENT
1) ESRD on HD  2) Anemia of renal dx  3) HTN  4) Renal bone dx    Consented for HD; will dialyze today;  Repeat CBC; plan for 1-2 units of PRBC on HD tonight depending on hemoglobin;  Hematology evaluation requested by family;  d/w Dr Fuentes and Dr Avendano

## 2020-08-28 NOTE — H&P ADULT - NSHPLABSRESULTS_GEN_ALL_CORE
7.4    6.82  )-----------( 220      ( 28 Aug 2020 14:47 )             23.0   08-28    139  |  96<L>  |  60.0<H>  ----------------------------<  209<H>  3.9   |  25.0  |  5.51<H>    Ca    8.9      28 Aug 2020 14:47

## 2020-08-28 NOTE — ED PROVIDER NOTE - PROGRESS NOTE DETAILS
patient seen by nephrology while in ED; to be admitted for HD  d/w hospitalist service, Dr Landaverde to admit  -md rafa

## 2020-08-28 NOTE — ED PROVIDER NOTE - OBJECTIVE STATEMENT
87 yo male with hx htn, dm, esrd on HD, s/p TAVR x 1 week ago, sent for admission for transfusion and HD  Patient with hgb 7.4, so Dr. Dennis recommended blood transfusion  patient needs inpatient HD to be tranfsused during HD  patient denies any cp or sob  denies abdominal pain  denies fever or chills

## 2020-08-29 ENCOUNTER — TRANSCRIPTION ENCOUNTER (OUTPATIENT)
Age: 85
End: 2020-08-29

## 2020-08-29 VITALS
HEART RATE: 70 BPM | TEMPERATURE: 99 F | SYSTOLIC BLOOD PRESSURE: 149 MMHG | DIASTOLIC BLOOD PRESSURE: 57 MMHG | RESPIRATION RATE: 18 BRPM | OXYGEN SATURATION: 97 %

## 2020-08-29 LAB
ANION GAP SERPL CALC-SCNC: 12 MMOL/L — SIGNIFICANT CHANGE UP (ref 5–17)
BUN SERPL-MCNC: 22 MG/DL — HIGH (ref 8–20)
CALCIUM SERPL-MCNC: 9.1 MG/DL — SIGNIFICANT CHANGE UP (ref 8.6–10.2)
CHLORIDE SERPL-SCNC: 100 MMOL/L — SIGNIFICANT CHANGE UP (ref 98–107)
CO2 SERPL-SCNC: 29 MMOL/L — SIGNIFICANT CHANGE UP (ref 22–29)
CREAT SERPL-MCNC: 2.84 MG/DL — HIGH (ref 0.5–1.3)
GLUCOSE SERPL-MCNC: 97 MG/DL — SIGNIFICANT CHANGE UP (ref 70–99)
HCT VFR BLD CALC: 29.1 % — LOW (ref 39–50)
HGB BLD-MCNC: 9.3 G/DL — LOW (ref 13–17)
MCHC RBC-ENTMCNC: 28.7 PG — SIGNIFICANT CHANGE UP (ref 27–34)
MCHC RBC-ENTMCNC: 32 GM/DL — SIGNIFICANT CHANGE UP (ref 32–36)
MCV RBC AUTO: 89.8 FL — SIGNIFICANT CHANGE UP (ref 80–100)
PLATELET # BLD AUTO: 208 K/UL — SIGNIFICANT CHANGE UP (ref 150–400)
POTASSIUM SERPL-MCNC: 3.6 MMOL/L — SIGNIFICANT CHANGE UP (ref 3.5–5.3)
POTASSIUM SERPL-SCNC: 3.6 MMOL/L — SIGNIFICANT CHANGE UP (ref 3.5–5.3)
RBC # BLD: 3.24 M/UL — LOW (ref 4.2–5.8)
RBC # FLD: 17.5 % — HIGH (ref 10.3–14.5)
SARS-COV-2 IGG SERPL QL IA: NEGATIVE — SIGNIFICANT CHANGE UP
SARS-COV-2 IGM SERPL IA-ACNC: <0.1 INDEX — SIGNIFICANT CHANGE UP
SODIUM SERPL-SCNC: 141 MMOL/L — SIGNIFICANT CHANGE UP (ref 135–145)
WBC # BLD: 7.25 K/UL — SIGNIFICANT CHANGE UP (ref 3.8–10.5)
WBC # FLD AUTO: 7.25 K/UL — SIGNIFICANT CHANGE UP (ref 3.8–10.5)

## 2020-08-29 PROCEDURE — 86769 SARS-COV-2 COVID-19 ANTIBODY: CPT

## 2020-08-29 PROCEDURE — 87635 SARS-COV-2 COVID-19 AMP PRB: CPT

## 2020-08-29 PROCEDURE — 99285 EMERGENCY DEPT VISIT HI MDM: CPT | Mod: 25

## 2020-08-29 PROCEDURE — 36430 TRANSFUSION BLD/BLD COMPNT: CPT

## 2020-08-29 PROCEDURE — 85027 COMPLETE CBC AUTOMATED: CPT

## 2020-08-29 PROCEDURE — 93005 ELECTROCARDIOGRAM TRACING: CPT

## 2020-08-29 PROCEDURE — P9016: CPT

## 2020-08-29 PROCEDURE — 86900 BLOOD TYPING SEROLOGIC ABO: CPT

## 2020-08-29 PROCEDURE — 99261: CPT

## 2020-08-29 PROCEDURE — 80048 BASIC METABOLIC PNL TOTAL CA: CPT

## 2020-08-29 PROCEDURE — 86901 BLOOD TYPING SEROLOGIC RH(D): CPT

## 2020-08-29 PROCEDURE — 36415 COLL VENOUS BLD VENIPUNCTURE: CPT

## 2020-08-29 PROCEDURE — 86850 RBC ANTIBODY SCREEN: CPT

## 2020-08-29 PROCEDURE — 85730 THROMBOPLASTIN TIME PARTIAL: CPT

## 2020-08-29 PROCEDURE — 85610 PROTHROMBIN TIME: CPT

## 2020-08-29 PROCEDURE — 86923 COMPATIBILITY TEST ELECTRIC: CPT

## 2020-08-29 PROCEDURE — 99233 SBSQ HOSP IP/OBS HIGH 50: CPT

## 2020-08-29 RX ADMIN — Medication 50 MILLIGRAM(S): at 05:20

## 2020-08-29 RX ADMIN — Medication 90 MILLIGRAM(S): at 05:20

## 2020-08-29 RX ADMIN — TICAGRELOR 60 MILLIGRAM(S): 90 TABLET ORAL at 05:21

## 2020-08-29 NOTE — DISCHARGE NOTE PROVIDER - NSDCCPCAREPLAN_GEN_ALL_CORE_FT
PRINCIPAL DISCHARGE DIAGNOSIS  Diagnosis: Anemia  Assessment and Plan of Treatment: Patient received blood transfusion with Hemodialysis session.   Continue to follow up with outpatient dialysis center for regularly scheduled Hemodialysis sessions.   Please follow up with your Nephrologist and Primary Care Physician 1-2 weeks from discharge.      SECONDARY DISCHARGE DIAGNOSES  Diagnosis: Renal failure, chronic  Assessment and Plan of Treatment: Please follow up with your Nephrologist and Primary Care Physician 1-2 weeks from discharge.    Diagnosis: S/P TAVR (transcatheter aortic valve replacement)  Assessment and Plan of Treatment: Continue to wear your MCOT monitor as previously instruted. If you have any issues, please call out CT Surgery office at [phone].

## 2020-08-29 NOTE — DISCHARGE NOTE PROVIDER - HOSPITAL COURSE
86 year old male patient with a medical history of Severe Aortic Stenosis s/p TAVR 8/21/20 with Dr. Lilly, ESRD on HD, and anemia of chronic disease, was admitted to [facility] 8/28 for blood transfusion with HD session after outpatient labs showed a Hgb level of 7.8. Upon admission, patient's hemoglobin was 7.4. Tolerated 1 session of hemodialysis 8/28 with noted urticarial transfusion reaction. Patient was evaluated by Nephrology and was given an antihistamine.  Patient remains hemodynamically stable at this time. Stable for discharge 86 year old male patient with a medical history of Severe Aortic Stenosis s/p TAVR 8/21/20 with Dr. Lilly, ESRD on HD, and anemia of chronic disease, was admitted to [facility] 8/28 for blood transfusion with HD session after outpatient labs showed a Hgb level of 7.8. Upon admission, patient's hemoglobin was 7.4. Tolerated 1 session of hemodialysis 8/28 with noted urticarial transfusion reaction. Patient was evaluated by Nephrology and was given an antihistamine.  Patient remains hemodynamically stable at this time. Stable for discharge    Constitutional: NAD, well developed, well nourished    Neuro: A+O x 3, non-focal, speech clear and intact    HEENT: NC/AT, PERRL, EOMI, anicteric sclerae, oral mucosa pink and moist    Neck: supple, no JVD    CV: regular rate, regular rhythm, +S1S2, 2/6 systolic murmur noted throughout    Pulm/chest: lung sounds CTA and equal bilaterally, no accessory muscle use noted    Abd: soft, NT, ND, +BS    Ext: MARQUEZ x 4, no C/C/E    Skin: warm, well perfused    Psych: calm, appropriate affect.        A/P      Plan to discharge today    continue hydralazine Imdur Cardura     cont Lipitor     Cont HD as per renal     DW CT team in rounds

## 2020-08-29 NOTE — DISCHARGE NOTE PROVIDER - NSDCMRMEDTOKEN_GEN_ALL_CORE_FT
aspirin 81 mg oral delayed release tablet: 1 tab(s) orally once a day  atorvastatin 80 mg oral tablet: 1 tab(s) orally once a day (in the morning)  Brilinta (ticagrelor) 60 mg oral tablet: 1 tab(s) orally 2 times a day to start on Monday 8/24  hydrALAZINE 50 mg oral tablet: 1 tab(s) orally 2 times a day  isosorbide mononitrate 30 mg oral tablet, extended release: 1 tab(s) orally once a day  Nephro-Thomas oral tablet: 1 tab(s) orally once a day  NIFEdipine 60 mg oral tablet, extended release: 1 tab(s) orally once a day (at bedtime)  NIFEdipine 90 mg oral tablet, extended release: 1 tab(s) orally   torsemide 20 mg oral tablet: 1 tab(s) orally 2 times a day on non HD-days

## 2020-08-29 NOTE — DISCHARGE NOTE PROVIDER - NSDCFUADDINST_GEN_ALL_CORE_FT
Please call the Cardiothoracic Surgery office at [phone] if you are experiencing any shortness of breath, chest pain, fevers or chills, drainage from any recent incisions, persistent nausea, vomiting or if you have any questions about your medications. If the symptoms are severe, call 911 and go to the nearest hospital. You can also call [phone] for an emergency [facility] ambulance, which will take you to the closest [facility].    If you need any assistance for making any appointments for a new consult or referral in any specialty, please call our [facility] Clinical Coordination Center at [phone].

## 2020-08-29 NOTE — DISCHARGE NOTE PROVIDER - NSDCFUSCHEDAPPT_GEN_ALL_CORE_FT
CHRISTIANO ELIZABETH ; 09/02/2020 ; [facility]  CHRISTIANO ELIZABETH ; 09/09/2020 ; [facility]  CHRISTIANO ELIZABETH ; 09/15/2020 ; [facility] [address]  CHRISTIANO ELIZABETH ; 09/21/2020 ; [facility] [address] ; 10/26/2020 ; NP [facility] [address] CHRISTIANO ELIZABETH ; 09/02/2020 ; [facility]  CHRISTIANO ELIZABETH ; 09/09/2020 ; [facility]  CHRISTIANO ELIZABETH ; 09/15/2020 ; [facility] [address]  CHRISTIANO ELIZABETH ; 09/21/2020 ; [facility] [address] ; 10/26/2020 ; NP [facility] [address]

## 2020-08-30 ENCOUNTER — TRANSCRIPTION ENCOUNTER (OUTPATIENT)
Age: 85
End: 2020-08-30

## 2020-08-31 ENCOUNTER — TRANSCRIPTION ENCOUNTER (OUTPATIENT)
Age: 85
End: 2020-08-31

## 2020-08-31 ENCOUNTER — LABORATORY RESULT (OUTPATIENT)
Age: 85
End: 2020-08-31

## 2020-09-01 ENCOUNTER — RESULT REVIEW (OUTPATIENT)
Age: 85
End: 2020-09-01

## 2020-09-01 ENCOUNTER — APPOINTMENT (OUTPATIENT)
Age: 85
End: 2020-09-01

## 2020-09-01 ENCOUNTER — APPOINTMENT (OUTPATIENT)
Dept: HEMATOLOGY ONCOLOGY | Facility: CLINIC | Age: 85
End: 2020-09-01
Payer: MEDICARE

## 2020-09-01 VITALS
BODY MASS INDEX: 25.07 KG/M2 | TEMPERATURE: 98.2 F | HEART RATE: 71 BPM | SYSTOLIC BLOOD PRESSURE: 148 MMHG | OXYGEN SATURATION: 94 % | DIASTOLIC BLOOD PRESSURE: 53 MMHG | HEIGHT: 66 IN | WEIGHT: 156.03 LBS

## 2020-09-01 PROCEDURE — 99212 OFFICE O/P EST SF 10 MIN: CPT

## 2020-09-02 ENCOUNTER — TRANSCRIPTION ENCOUNTER (OUTPATIENT)
Age: 85
End: 2020-09-02

## 2020-09-02 DIAGNOSIS — N18.3 CHRONIC KIDNEY DISEASE, STAGE 3 (MODERATE): ICD-10-CM

## 2020-09-02 DIAGNOSIS — N18.5 CHRONIC KIDNEY DISEASE, STAGE 5: ICD-10-CM

## 2020-09-02 LAB
ALBUMIN SERPL ELPH-MCNC: 3.6 G/DL
ALP BLD-CCNC: 101 U/L
ALT SERPL-CCNC: 66 U/L
ANION GAP SERPL CALC-SCNC: 15 MMOL/L
AST SERPL-CCNC: 34 U/L
BILIRUB SERPL-MCNC: 0.4 MG/DL
BUN SERPL-MCNC: 28 MG/DL
CALCIUM SERPL-MCNC: 9.2 MG/DL
CHLORIDE SERPL-SCNC: 97 MMOL/L
CO2 SERPL-SCNC: 28 MMOL/L
CREAT SERPL-MCNC: 3.23 MG/DL
FERRITIN SERPL-MCNC: 334 NG/ML
FOLATE SERPL-MCNC: >20 NG/ML
GLUCOSE SERPL-MCNC: 173 MG/DL
IRON SATN MFR SERPL: 18 %
IRON SERPL-MCNC: 42 UG/DL
POTASSIUM SERPL-SCNC: 4.4 MMOL/L
PROT SERPL-MCNC: 6 G/DL
RBC # BLD: 3.3 M/UL
RETICS # AUTO: 1.3 %
RETICS AGGREG/RBC NFR: 41.3 K/UL
SODIUM SERPL-SCNC: 139 MMOL/L
TIBC SERPL-MCNC: 229 UG/DL
UIBC SERPL-MCNC: 187 UG/DL
VIT B12 SERPL-MCNC: 744 PG/ML

## 2020-09-02 NOTE — HISTORY OF PRESENT ILLNESS
[de-identified] : \par Mr. King is an 85 yo WM with a long history of renal, disease, currently Stage 5, who had been treated conservatively, on procrit/aranesp at [facility], but recently initiated dialysis.  Following a recent TAVR for worsening AS on August 21 he has required several transfusions to maintain hemoglobin of 9 g.  No overt bleeding sites have been identified.\par  [de-identified] : Has been receiving retacrit at dialysis.\par Will attempt moving back to Aranesp 300 mcg every 2 weeks.

## 2020-09-03 ENCOUNTER — TRANSCRIPTION ENCOUNTER (OUTPATIENT)
Age: 85
End: 2020-09-03

## 2020-09-08 ENCOUNTER — TRANSCRIPTION ENCOUNTER (OUTPATIENT)
Age: 85
End: 2020-09-08

## 2020-09-09 ENCOUNTER — TRANSCRIPTION ENCOUNTER (OUTPATIENT)
Age: 85
End: 2020-09-09

## 2020-09-09 ENCOUNTER — APPOINTMENT (OUTPATIENT)
Dept: HEMATOLOGY ONCOLOGY | Facility: CLINIC | Age: 85
End: 2020-09-09

## 2020-09-14 ENCOUNTER — TRANSCRIPTION ENCOUNTER (OUTPATIENT)
Age: 85
End: 2020-09-14

## 2020-09-14 ENCOUNTER — INPATIENT (INPATIENT)
Facility: HOSPITAL | Age: 85
LOS: 3 days | Discharge: HOME | DRG: 811 | End: 2020-09-18
Attending: THORACIC SURGERY (CARDIOTHORACIC VASCULAR SURGERY) | Admitting: HOSPITALIST
Payer: MEDICARE

## 2020-09-14 VITALS
OXYGEN SATURATION: 100 % | HEIGHT: 65 IN | RESPIRATION RATE: 16 BRPM | WEIGHT: 149.91 LBS | SYSTOLIC BLOOD PRESSURE: 127 MMHG | HEART RATE: 74 BPM | DIASTOLIC BLOOD PRESSURE: 55 MMHG | TEMPERATURE: 98 F

## 2020-09-14 DIAGNOSIS — Z98.890 OTHER SPECIFIED POSTPROCEDURAL STATES: Chronic | ICD-10-CM

## 2020-09-14 DIAGNOSIS — D64.9 ANEMIA, UNSPECIFIED: ICD-10-CM

## 2020-09-14 DIAGNOSIS — I77.0 ARTERIOVENOUS FISTULA, ACQUIRED: Chronic | ICD-10-CM

## 2020-09-14 DIAGNOSIS — Z98.62 PERIPHERAL VASCULAR ANGIOPLASTY STATUS: Chronic | ICD-10-CM

## 2020-09-14 LAB
ALBUMIN SERPL ELPH-MCNC: 3.5 G/DL — SIGNIFICANT CHANGE UP (ref 3.3–5.2)
ALP SERPL-CCNC: 84 U/L — SIGNIFICANT CHANGE UP (ref 40–120)
ALT FLD-CCNC: 43 U/L — HIGH
ANION GAP SERPL CALC-SCNC: 13 MMOL/L — SIGNIFICANT CHANGE UP (ref 5–17)
ANION GAP SERPL CALC-SCNC: 13 MMOL/L — SIGNIFICANT CHANGE UP (ref 5–17)
ANISOCYTOSIS BLD QL: SIGNIFICANT CHANGE UP
APTT BLD: 28.6 SEC — SIGNIFICANT CHANGE UP (ref 27.5–35.5)
AST SERPL-CCNC: 100 U/L — HIGH
BASO STIPL BLD QL SMEAR: PRESENT — SIGNIFICANT CHANGE UP
BASOPHILS # BLD AUTO: 0 K/UL — SIGNIFICANT CHANGE UP (ref 0–0.2)
BASOPHILS NFR BLD AUTO: 0 % — SIGNIFICANT CHANGE UP (ref 0–2)
BILIRUB SERPL-MCNC: 0.4 MG/DL — SIGNIFICANT CHANGE UP (ref 0.4–2)
BLD GP AB SCN SERPL QL: SIGNIFICANT CHANGE UP
BUN SERPL-MCNC: 29 MG/DL — HIGH (ref 8–20)
BUN SERPL-MCNC: 30 MG/DL — HIGH (ref 8–20)
CALCIUM SERPL-MCNC: 8.7 MG/DL — SIGNIFICANT CHANGE UP (ref 8.6–10.2)
CALCIUM SERPL-MCNC: 8.8 MG/DL — SIGNIFICANT CHANGE UP (ref 8.6–10.2)
CHLORIDE SERPL-SCNC: 100 MMOL/L — SIGNIFICANT CHANGE UP (ref 98–107)
CHLORIDE SERPL-SCNC: 101 MMOL/L — SIGNIFICANT CHANGE UP (ref 98–107)
CK SERPL-CCNC: 111 U/L — SIGNIFICANT CHANGE UP (ref 30–200)
CO2 SERPL-SCNC: 23 MMOL/L — SIGNIFICANT CHANGE UP (ref 22–29)
CO2 SERPL-SCNC: 24 MMOL/L — SIGNIFICANT CHANGE UP (ref 22–29)
CREAT SERPL-MCNC: 2.32 MG/DL — HIGH (ref 0.5–1.3)
CREAT SERPL-MCNC: 2.52 MG/DL — HIGH (ref 0.5–1.3)
ELLIPTOCYTES BLD QL SMEAR: SLIGHT — SIGNIFICANT CHANGE UP
EOSINOPHIL # BLD AUTO: 0.29 K/UL — SIGNIFICANT CHANGE UP (ref 0–0.5)
EOSINOPHIL NFR BLD AUTO: 3.5 % — SIGNIFICANT CHANGE UP (ref 0–6)
GIANT PLATELETS BLD QL SMEAR: PRESENT — SIGNIFICANT CHANGE UP
GLUCOSE SERPL-MCNC: 167 MG/DL — HIGH (ref 70–99)
GLUCOSE SERPL-MCNC: 216 MG/DL — HIGH (ref 70–99)
HCT VFR BLD CALC: 16.1 % — CRITICAL LOW (ref 39–50)
HGB BLD-MCNC: 4.9 G/DL — CRITICAL LOW (ref 13–17)
HYPOCHROMIA BLD QL: SLIGHT — SIGNIFICANT CHANGE UP
INR BLD: 1.12 RATIO — SIGNIFICANT CHANGE UP (ref 0.88–1.16)
LYMPHOCYTES # BLD AUTO: 0.71 K/UL — LOW (ref 1–3.3)
LYMPHOCYTES # BLD AUTO: 8.7 % — LOW (ref 13–44)
MACROCYTES BLD QL: SLIGHT — SIGNIFICANT CHANGE UP
MANUAL SMEAR VERIFICATION: SIGNIFICANT CHANGE UP
MCHC RBC-ENTMCNC: 30.4 GM/DL — LOW (ref 32–36)
MCHC RBC-ENTMCNC: 31 PG — SIGNIFICANT CHANGE UP (ref 27–34)
MCV RBC AUTO: 101.9 FL — HIGH (ref 80–100)
MICROCYTES BLD QL: SLIGHT — SIGNIFICANT CHANGE UP
MONOCYTES # BLD AUTO: 0.35 K/UL — SIGNIFICANT CHANGE UP (ref 0–0.9)
MONOCYTES NFR BLD AUTO: 4.3 % — SIGNIFICANT CHANGE UP (ref 2–14)
MYELOCYTES NFR BLD: 0.9 % — HIGH (ref 0–0)
NEUTROPHILS # BLD AUTO: 6.75 K/UL — SIGNIFICANT CHANGE UP (ref 1.8–7.4)
NEUTROPHILS NFR BLD AUTO: 78.3 % — HIGH (ref 43–77)
NEUTS BAND # BLD: 4.3 % — SIGNIFICANT CHANGE UP (ref 0–8)
NRBC # BLD: 1 /100 — HIGH (ref 0–0)
NT-PROBNP SERPL-SCNC: 9603 PG/ML — HIGH (ref 0–300)
OVALOCYTES BLD QL SMEAR: SLIGHT — SIGNIFICANT CHANGE UP
PLAT MORPH BLD: NORMAL — SIGNIFICANT CHANGE UP
PLATELET # BLD AUTO: 127 K/UL — LOW (ref 150–400)
POIKILOCYTOSIS BLD QL AUTO: SLIGHT — SIGNIFICANT CHANGE UP
POLYCHROMASIA BLD QL SMEAR: SIGNIFICANT CHANGE UP
POTASSIUM SERPL-MCNC: 4.1 MMOL/L — SIGNIFICANT CHANGE UP (ref 3.5–5.3)
POTASSIUM SERPL-MCNC: 5.1 MMOL/L — SIGNIFICANT CHANGE UP (ref 3.5–5.3)
POTASSIUM SERPL-SCNC: 4.1 MMOL/L — SIGNIFICANT CHANGE UP (ref 3.5–5.3)
POTASSIUM SERPL-SCNC: 5.1 MMOL/L — SIGNIFICANT CHANGE UP (ref 3.5–5.3)
PROT SERPL-MCNC: 5.9 G/DL — LOW (ref 6.6–8.7)
PROTHROM AB SERPL-ACNC: 12.9 SEC — SIGNIFICANT CHANGE UP (ref 10.6–13.6)
RBC # BLD: 1.58 M/UL — LOW (ref 4.2–5.8)
RBC # FLD: 23.1 % — HIGH (ref 10.3–14.5)
RBC BLD AUTO: ABNORMAL
SARS-COV-2 RNA SPEC QL NAA+PROBE: SIGNIFICANT CHANGE UP
SODIUM SERPL-SCNC: 136 MMOL/L — SIGNIFICANT CHANGE UP (ref 135–145)
SODIUM SERPL-SCNC: 137 MMOL/L — SIGNIFICANT CHANGE UP (ref 135–145)
TROPONIN T SERPL-MCNC: 0.1 NG/ML — HIGH (ref 0–0.06)
WBC # BLD: 8.17 K/UL — SIGNIFICANT CHANGE UP (ref 3.8–10.5)
WBC # FLD AUTO: 8.17 K/UL — SIGNIFICANT CHANGE UP (ref 3.8–10.5)

## 2020-09-14 PROCEDURE — 70450 CT HEAD/BRAIN W/O DYE: CPT | Mod: 26

## 2020-09-14 PROCEDURE — 99291 CRITICAL CARE FIRST HOUR: CPT

## 2020-09-14 PROCEDURE — 99233 SBSQ HOSP IP/OBS HIGH 50: CPT

## 2020-09-14 PROCEDURE — 93010 ELECTROCARDIOGRAM REPORT: CPT

## 2020-09-14 PROCEDURE — 71045 X-RAY EXAM CHEST 1 VIEW: CPT | Mod: 26

## 2020-09-14 NOTE — ED PROVIDER NOTE - CARE PLAN
Principal Discharge DX:	Acute anemia  Secondary Diagnosis:	Near syncope  Secondary Diagnosis:	ESRD on dialysis

## 2020-09-14 NOTE — ED PROVIDER NOTE - CLINICAL SUMMARY MEDICAL DECISION MAKING FREE TEXT BOX
Patient presenting with several episodes of syncope and dizziness, with dialysis today cut short. Patient has significant comorbidities. Will evaluate with CT head, lab work for infectious or metabolic abnormalities, CXR, and EKG.

## 2020-09-14 NOTE — ED PROVIDER NOTE - NS ED ROS FT
General: Denies fever, chills  HEENT: Denies sensory changes, sore throat  Neck: Denies neck pain, neck stiffness  Resp: Denies coughing, SOB  Cardiovascular: Denies CP, palpitations, LE edema  GI: Denies nausea, vomiting, abdominal pain, diarrhea  : Denies dysuria, hematuria, frequency, incontinence  MSK: Denies back pain  Neuro: Endorses dizziness, syncope, weakness.    Skin: Denies rashes

## 2020-09-14 NOTE — ED ADULT TRIAGE NOTE - CHIEF COMPLAINT QUOTE
patient from home as per ems and daughter at bedside patient felt dizzy and almost sycopized, patient guided to floor by family. daughter at bedside states that patient was at dialysis today and the session was ended 30 minutes early due to patient feeling dizzy. patient states that his only pain is to bilateral feet, denies any complaints of chest pain or discomfort, patient pale appearing, Dr Arreaga at bedside for eval

## 2020-09-14 NOTE — ED PROVIDER NOTE - PMH
Anemia    Aortic stenosis    Arrhythmia    AV block, 1st degree    CAD (coronary artery disease)    DM (diabetes mellitus)    RICHARDS (dyspnea on exertion)    ESRD on dialysis  HD on Mondays and Fridays  GI bleeding  due to ulcerated polyps and colonic AVMs  HTN (hypertension)    Risk factors for obstructive sleep apnea    VT (ventricular tachycardia)     Normal rate, regular rhythm.  Heart sounds S1, S2.  No murmurs, rubs or gallops.

## 2020-09-14 NOTE — ED PROVIDER NOTE - PHYSICAL EXAMINATION
General: Well appearing elderly male in no acute distress  HEENT: Normocephalic, atraumatic. Moist mucous membranes. Oropharynx clear.   Eyes: No scleral icterus. EOMI. SOBIA.  Neck: Soft and supple. Full ROM without pain. No midline tenderness  Cardiac: Well healed midline surgical scar. Cardiac monitoring device in place. Regular rate and regular rhythm. No murmurs, rubs, gallops. Chronic 1+ LE edema.   Resp: Lungs CTAB. Speaking in full sentences. No wheezes, rales or rhonchi.  Abd: Soft, non-tender, non-distended. No guarding or rebound.   Back: Spine midline and non-tender. No CVA tenderness.    Skin: No rashes, abrasions, or lacerations.  Neuro: AO x 3. CN II-XII intact. Gait not assessed. Moves all extremities symmetrically. Motor strength 5/5 and sensation to light touch intact UE/LE b/l.

## 2020-09-14 NOTE — ED PROVIDER NOTE - ATTENDING CONTRIBUTION TO CARE
I personally saw the patient with the resident, and completed the key components of the history and physical exam. I then discussed the management plan with the resident.   gen pale weak resp clear cardiac no murumur abd soft neuro no laterazing deficits heent + pale conjuctiva

## 2020-09-14 NOTE — ED PROVIDER NOTE - OBJECTIVE STATEMENT
Pt is an 87yo M presenting with dizziness and syncope earlier today. Daughter is at bedside and reports that patient was at dialysis today but felt dizzy and was cut short by 30 minutes. She reports that patient was feeling well at home but had an episode of dizziness again and syncopized, falling to the ground. Daughter reports that patient has had similar episodes of falling on Thursday and Friday. She notes that patient had a TAVR 3 weeks ago and has a monitor on his chest. She endorses PMH of HTN, CAD, anemia. Patient only complaint at this time is chronic painful feet. Patient denies f/c/n/v/d/cp/sob.

## 2020-09-14 NOTE — ED CLERICAL - NS ED CLERK NOTE PRE-ARRIVAL INFORMATION; ADDITIONAL PRE-ARRIVAL INFORMATION
This patient is enrolled in the Follow Your Heart program and has undergone a cardiac surgery procedure and has active care navigation. This patient can be followed up by the care navigation team within 24 hours. To arrange close follow-up or to obtain additional clinical information about this patient, please call the contact number above.     Please call the cardiac surgery team once patient is registered at [phone] for consultation PRIOR to disposition decision.  The patient recently underwent a cardiac surgery procedure and the team can assist in acute medical management.

## 2020-09-14 NOTE — ED ADULT NURSE NOTE - OBJECTIVE STATEMENT
pt presents to the ed c/o weakness and near syncopal episode. denies and sob,cp, any other s/s of acute distress.

## 2020-09-15 ENCOUNTER — APPOINTMENT (OUTPATIENT)
Age: 85
End: 2020-09-15

## 2020-09-15 ENCOUNTER — APPOINTMENT (OUTPATIENT)
Dept: VASCULAR SURGERY | Facility: CLINIC | Age: 85
End: 2020-09-15

## 2020-09-15 DIAGNOSIS — I35.0 NONRHEUMATIC AORTIC (VALVE) STENOSIS: ICD-10-CM

## 2020-09-15 DIAGNOSIS — I10 ESSENTIAL (PRIMARY) HYPERTENSION: ICD-10-CM

## 2020-09-15 DIAGNOSIS — N18.6 END STAGE RENAL DISEASE: ICD-10-CM

## 2020-09-15 DIAGNOSIS — E11.9 TYPE 2 DIABETES MELLITUS WITHOUT COMPLICATIONS: ICD-10-CM

## 2020-09-15 DIAGNOSIS — Z29.9 ENCOUNTER FOR PROPHYLACTIC MEASURES, UNSPECIFIED: ICD-10-CM

## 2020-09-15 DIAGNOSIS — D64.9 ANEMIA, UNSPECIFIED: ICD-10-CM

## 2020-09-15 DIAGNOSIS — Z95.2 PRESENCE OF PROSTHETIC HEART VALVE: Chronic | ICD-10-CM

## 2020-09-15 DIAGNOSIS — G62.9 POLYNEUROPATHY, UNSPECIFIED: ICD-10-CM

## 2020-09-15 LAB
A1C WITH ESTIMATED AVERAGE GLUCOSE RESULT: 4.6 % — SIGNIFICANT CHANGE UP (ref 4–5.6)
ANION GAP SERPL CALC-SCNC: 10 MMOL/L — SIGNIFICANT CHANGE UP (ref 5–17)
APPEARANCE UR: CLEAR — SIGNIFICANT CHANGE UP
APTT BLD: 28.7 SEC — SIGNIFICANT CHANGE UP (ref 27.5–35.5)
BACTERIA # UR AUTO: ABNORMAL
BILIRUB UR-MCNC: NEGATIVE — SIGNIFICANT CHANGE UP
BUN SERPL-MCNC: 32 MG/DL — HIGH (ref 8–20)
CALCIUM SERPL-MCNC: 8.9 MG/DL — SIGNIFICANT CHANGE UP (ref 8.6–10.2)
CHLORIDE SERPL-SCNC: 104 MMOL/L — SIGNIFICANT CHANGE UP (ref 98–107)
CO2 SERPL-SCNC: 26 MMOL/L — SIGNIFICANT CHANGE UP (ref 22–29)
COLOR SPEC: YELLOW — SIGNIFICANT CHANGE UP
CREAT SERPL-MCNC: 2.99 MG/DL — HIGH (ref 0.5–1.3)
DIFF PNL FLD: ABNORMAL
EPI CELLS # UR: SIGNIFICANT CHANGE UP
ESTIMATED AVERAGE GLUCOSE: 85 MG/DL — SIGNIFICANT CHANGE UP (ref 68–114)
GLUCOSE SERPL-MCNC: 106 MG/DL — HIGH (ref 70–99)
GLUCOSE UR QL: 100 MG/DL
HCT VFR BLD CALC: 22.5 % — LOW (ref 39–50)
HCT VFR BLD CALC: 24.8 % — LOW (ref 39–50)
HCT VFR BLD CALC: 26.4 % — LOW (ref 39–50)
HGB BLD-MCNC: 7.1 G/DL — LOW (ref 13–17)
HGB BLD-MCNC: 8 G/DL — LOW (ref 13–17)
HGB BLD-MCNC: 8.4 G/DL — LOW (ref 13–17)
INR BLD: 1.1 RATIO — SIGNIFICANT CHANGE UP (ref 0.88–1.16)
KETONES UR-MCNC: NEGATIVE — SIGNIFICANT CHANGE UP
LEUKOCYTE ESTERASE UR-ACNC: ABNORMAL
MAGNESIUM SERPL-MCNC: 2 MG/DL — SIGNIFICANT CHANGE UP (ref 1.6–2.6)
MCHC RBC-ENTMCNC: 30.2 PG — SIGNIFICANT CHANGE UP (ref 27–34)
MCHC RBC-ENTMCNC: 31.2 PG — SIGNIFICANT CHANGE UP (ref 27–34)
MCHC RBC-ENTMCNC: 31.3 PG — SIGNIFICANT CHANGE UP (ref 27–34)
MCHC RBC-ENTMCNC: 31.6 GM/DL — LOW (ref 32–36)
MCHC RBC-ENTMCNC: 31.8 GM/DL — LOW (ref 32–36)
MCHC RBC-ENTMCNC: 32.3 GM/DL — SIGNIFICANT CHANGE UP (ref 32–36)
MCV RBC AUTO: 95.7 FL — SIGNIFICANT CHANGE UP (ref 80–100)
MCV RBC AUTO: 96.9 FL — SIGNIFICANT CHANGE UP (ref 80–100)
MCV RBC AUTO: 98.1 FL — SIGNIFICANT CHANGE UP (ref 80–100)
MRSA PCR RESULT.: SIGNIFICANT CHANGE UP
NITRITE UR-MCNC: NEGATIVE — SIGNIFICANT CHANGE UP
NT-PROBNP SERPL-SCNC: 8995 PG/ML — HIGH (ref 0–300)
OB PNL STL IA: POSITIVE
PH UR: 8 — SIGNIFICANT CHANGE UP (ref 5–8)
PLATELET # BLD AUTO: 110 K/UL — LOW (ref 150–400)
PLATELET # BLD AUTO: 118 K/UL — LOW (ref 150–400)
PLATELET # BLD AUTO: 79 K/UL — LOW (ref 150–400)
POTASSIUM SERPL-MCNC: 3.9 MMOL/L — SIGNIFICANT CHANGE UP (ref 3.5–5.3)
POTASSIUM SERPL-SCNC: 3.9 MMOL/L — SIGNIFICANT CHANGE UP (ref 3.5–5.3)
PROT UR-MCNC: 100 MG/DL
PROTHROM AB SERPL-ACNC: 12.7 SEC — SIGNIFICANT CHANGE UP (ref 10.6–13.6)
RBC # BLD: 2.35 M/UL — LOW (ref 4.2–5.8)
RBC # BLD: 2.56 M/UL — LOW (ref 4.2–5.8)
RBC # BLD: 2.69 M/UL — LOW (ref 4.2–5.8)
RBC # FLD: 20.2 % — HIGH (ref 10.3–14.5)
RBC # FLD: 21.1 % — HIGH (ref 10.3–14.5)
RBC # FLD: 21.1 % — HIGH (ref 10.3–14.5)
RBC CASTS # UR COMP ASSIST: SIGNIFICANT CHANGE UP /HPF (ref 0–4)
S AUREUS DNA NOSE QL NAA+PROBE: SIGNIFICANT CHANGE UP
SODIUM SERPL-SCNC: 140 MMOL/L — SIGNIFICANT CHANGE UP (ref 135–145)
SP GR SPEC: 1.01 — SIGNIFICANT CHANGE UP (ref 1.01–1.02)
TSH SERPL-MCNC: 2.16 UIU/ML — SIGNIFICANT CHANGE UP (ref 0.27–4.2)
UROBILINOGEN FLD QL: NEGATIVE MG/DL — SIGNIFICANT CHANGE UP
WBC # BLD: 6.24 K/UL — SIGNIFICANT CHANGE UP (ref 3.8–10.5)
WBC # BLD: 6.69 K/UL — SIGNIFICANT CHANGE UP (ref 3.8–10.5)
WBC # BLD: 6.97 K/UL — SIGNIFICANT CHANGE UP (ref 3.8–10.5)
WBC # FLD AUTO: 6.24 K/UL — SIGNIFICANT CHANGE UP (ref 3.8–10.5)
WBC # FLD AUTO: 6.69 K/UL — SIGNIFICANT CHANGE UP (ref 3.8–10.5)
WBC # FLD AUTO: 6.97 K/UL — SIGNIFICANT CHANGE UP (ref 3.8–10.5)
WBC UR QL: SIGNIFICANT CHANGE UP

## 2020-09-15 PROCEDURE — 99233 SBSQ HOSP IP/OBS HIGH 50: CPT

## 2020-09-15 PROCEDURE — 99223 1ST HOSP IP/OBS HIGH 75: CPT

## 2020-09-15 PROCEDURE — 93010 ELECTROCARDIOGRAM REPORT: CPT

## 2020-09-15 RX ORDER — DEXTROSE 50 % IN WATER 50 %
25 SYRINGE (ML) INTRAVENOUS ONCE
Refills: 0 | Status: DISCONTINUED | OUTPATIENT
Start: 2020-09-15 | End: 2020-09-15

## 2020-09-15 RX ORDER — HYDRALAZINE HCL 50 MG
50 TABLET ORAL EVERY 8 HOURS
Refills: 0 | Status: DISCONTINUED | OUTPATIENT
Start: 2020-09-15 | End: 2020-09-18

## 2020-09-15 RX ORDER — DOXAZOSIN MESYLATE 4 MG
1 TABLET ORAL AT BEDTIME
Refills: 0 | Status: DISCONTINUED | OUTPATIENT
Start: 2020-09-15 | End: 2020-09-18

## 2020-09-15 RX ORDER — SODIUM CHLORIDE 9 MG/ML
1000 INJECTION, SOLUTION INTRAVENOUS
Refills: 0 | Status: DISCONTINUED | OUTPATIENT
Start: 2020-09-15 | End: 2020-09-15

## 2020-09-15 RX ORDER — ACETAMINOPHEN 500 MG
1000 TABLET ORAL ONCE
Refills: 0 | Status: COMPLETED | OUTPATIENT
Start: 2020-09-15 | End: 2020-09-15

## 2020-09-15 RX ORDER — GLUCAGON INJECTION, SOLUTION 0.5 MG/.1ML
1 INJECTION, SOLUTION SUBCUTANEOUS ONCE
Refills: 0 | Status: DISCONTINUED | OUTPATIENT
Start: 2020-09-15 | End: 2020-09-15

## 2020-09-15 RX ORDER — INFLUENZA VIRUS VACCINE 15; 15; 15; 15 UG/.5ML; UG/.5ML; UG/.5ML; UG/.5ML
0.5 SUSPENSION INTRAMUSCULAR ONCE
Refills: 0 | Status: DISCONTINUED | OUTPATIENT
Start: 2020-09-15 | End: 2020-09-18

## 2020-09-15 RX ORDER — NIFEDIPINE 30 MG
60 TABLET, EXTENDED RELEASE 24 HR ORAL
Refills: 0 | Status: DISCONTINUED | OUTPATIENT
Start: 2020-09-15 | End: 2020-09-18

## 2020-09-15 RX ORDER — NIFEDIPINE 30 MG
90 TABLET, EXTENDED RELEASE 24 HR ORAL
Refills: 0 | Status: DISCONTINUED | OUTPATIENT
Start: 2020-09-15 | End: 2020-09-18

## 2020-09-15 RX ORDER — INSULIN LISPRO 100/ML
VIAL (ML) SUBCUTANEOUS
Refills: 0 | Status: DISCONTINUED | OUTPATIENT
Start: 2020-09-15 | End: 2020-09-15

## 2020-09-15 RX ORDER — IRON SUCROSE 20 MG/ML
100 INJECTION, SOLUTION INTRAVENOUS
Refills: 0 | Status: DISCONTINUED | OUTPATIENT
Start: 2020-09-15 | End: 2020-09-18

## 2020-09-15 RX ORDER — PANTOPRAZOLE SODIUM 20 MG/1
40 TABLET, DELAYED RELEASE ORAL DAILY
Refills: 0 | Status: DISCONTINUED | OUTPATIENT
Start: 2020-09-15 | End: 2020-09-18

## 2020-09-15 RX ORDER — DEXTROSE 50 % IN WATER 50 %
15 SYRINGE (ML) INTRAVENOUS ONCE
Refills: 0 | Status: DISCONTINUED | OUTPATIENT
Start: 2020-09-15 | End: 2020-09-15

## 2020-09-15 RX ORDER — HYDRALAZINE HCL 50 MG
10 TABLET ORAL
Refills: 0 | Status: DISCONTINUED | OUTPATIENT
Start: 2020-09-15 | End: 2020-09-18

## 2020-09-15 RX ORDER — SODIUM CHLORIDE 9 MG/ML
3 INJECTION INTRAMUSCULAR; INTRAVENOUS; SUBCUTANEOUS EVERY 8 HOURS
Refills: 0 | Status: DISCONTINUED | OUTPATIENT
Start: 2020-09-14 | End: 2020-09-18

## 2020-09-15 RX ORDER — HYDRALAZINE HCL 50 MG
10 TABLET ORAL ONCE
Refills: 0 | Status: COMPLETED | OUTPATIENT
Start: 2020-09-15 | End: 2020-09-15

## 2020-09-15 RX ORDER — DEXTROSE 50 % IN WATER 50 %
12.5 SYRINGE (ML) INTRAVENOUS ONCE
Refills: 0 | Status: DISCONTINUED | OUTPATIENT
Start: 2020-09-15 | End: 2020-09-15

## 2020-09-15 RX ORDER — HYDRALAZINE HCL 50 MG
50 TABLET ORAL
Refills: 0 | Status: DISCONTINUED | OUTPATIENT
Start: 2020-09-15 | End: 2020-09-15

## 2020-09-15 RX ORDER — ATORVASTATIN CALCIUM 80 MG/1
80 TABLET, FILM COATED ORAL DAILY
Refills: 0 | Status: DISCONTINUED | OUTPATIENT
Start: 2020-09-15 | End: 2020-09-18

## 2020-09-15 RX ORDER — ISOSORBIDE MONONITRATE 60 MG/1
30 TABLET, EXTENDED RELEASE ORAL
Refills: 0 | Status: DISCONTINUED | OUTPATIENT
Start: 2020-09-15 | End: 2020-09-18

## 2020-09-15 RX ORDER — DULOXETINE HYDROCHLORIDE 30 MG/1
30 CAPSULE, DELAYED RELEASE ORAL DAILY
Refills: 0 | Status: DISCONTINUED | OUTPATIENT
Start: 2020-09-15 | End: 2020-09-18

## 2020-09-15 RX ORDER — FUROSEMIDE 40 MG
40 TABLET ORAL ONCE
Refills: 0 | Status: COMPLETED | OUTPATIENT
Start: 2020-09-15 | End: 2020-09-15

## 2020-09-15 RX ADMIN — Medication 50 MILLIGRAM(S): at 22:34

## 2020-09-15 RX ADMIN — SODIUM CHLORIDE 3 MILLILITER(S): 9 INJECTION INTRAMUSCULAR; INTRAVENOUS; SUBCUTANEOUS at 13:23

## 2020-09-15 RX ADMIN — PANTOPRAZOLE SODIUM 40 MILLIGRAM(S): 20 TABLET, DELAYED RELEASE ORAL at 03:14

## 2020-09-15 RX ADMIN — ATORVASTATIN CALCIUM 80 MILLIGRAM(S): 80 TABLET, FILM COATED ORAL at 22:32

## 2020-09-15 RX ADMIN — Medication 40 MILLIGRAM(S): at 03:14

## 2020-09-15 RX ADMIN — Medication 60 MILLIGRAM(S): at 22:32

## 2020-09-15 RX ADMIN — IRON SUCROSE 210 MILLIGRAM(S): 20 INJECTION, SOLUTION INTRAVENOUS at 13:23

## 2020-09-15 RX ADMIN — Medication 10 MILLIGRAM(S): at 04:37

## 2020-09-15 RX ADMIN — Medication 400 MILLIGRAM(S): at 04:48

## 2020-09-15 RX ADMIN — ISOSORBIDE MONONITRATE 30 MILLIGRAM(S): 60 TABLET, EXTENDED RELEASE ORAL at 22:33

## 2020-09-15 RX ADMIN — SODIUM CHLORIDE 3 MILLILITER(S): 9 INJECTION INTRAMUSCULAR; INTRAVENOUS; SUBCUTANEOUS at 21:41

## 2020-09-15 RX ADMIN — DULOXETINE HYDROCHLORIDE 30 MILLIGRAM(S): 30 CAPSULE, DELAYED RELEASE ORAL at 22:31

## 2020-09-15 RX ADMIN — PANTOPRAZOLE SODIUM 40 MILLIGRAM(S): 20 TABLET, DELAYED RELEASE ORAL at 11:59

## 2020-09-15 RX ADMIN — Medication 1 MILLIGRAM(S): at 22:31

## 2020-09-15 RX ADMIN — SODIUM CHLORIDE 3 MILLILITER(S): 9 INJECTION INTRAMUSCULAR; INTRAVENOUS; SUBCUTANEOUS at 06:01

## 2020-09-15 RX ADMIN — Medication 50 MILLIGRAM(S): at 13:23

## 2020-09-15 RX ADMIN — Medication 90 MILLIGRAM(S): at 09:30

## 2020-09-15 RX ADMIN — Medication 1000 MILLIGRAM(S): at 05:15

## 2020-09-15 RX ADMIN — Medication 50 MILLIGRAM(S): at 06:02

## 2020-09-15 RX ADMIN — Medication 1 TABLET(S): at 13:22

## 2020-09-15 NOTE — CONSULT NOTE ADULT - SUBJECTIVE AND OBJECTIVE BOX
[facility]                                   [address] [place] [address]                                                      PHONE: [phone]                                                         FAX: [phone]  [email or web address]  ---------------------------------------------------------------------------------------------------------------------------------    Reason for Consult: sx anemia    HPI:  CHRISTIANO ELIZABETH is an 86y Male with HTN nonobst CAD preserved EF severe AS s/p recent TAVR, ESRD on HD, long standing anemia, known colonic AVMs with recent multiple negative EGD/colonscopy admitted with sx anemia and Hgb 4 w/o evidence of bleeding.    PAST MEDICAL & SURGICAL HISTORY:  GI bleeding  due to ulcerated polyps and colonic AVMs    Aortic stenosis    ESRD on dialysis  HD on  and     Risk factors for obstructive sleep apnea    Anemia    RICHARDS (dyspnea on exertion)    VT (ventricular tachycardia)    HTN (hypertension)    CAD (coronary artery disease)    Arrhythmia    AV block, 1st degree    DM (diabetes mellitus)    S/P TAVR (transcatheter aortic valve replacement)    H/O carotid endarterectomy  Right    A-V fistula  left arm 2017    H/O angioplasty  ,  no  intervention    H/O left knee surgery    H/O circumcision  at  age  65        Plavix (Hives)  provide vanilla nepro TID- RD ok (Unknown)  Toprol-XL (Rash)      MEDICATIONS  (STANDING):  atorvastatin 80 milliGRAM(s) Oral daily  hydrALAZINE 50 milliGRAM(s) Oral two times a day  influenza   Vaccine 0.5 milliLiter(s) IntraMuscular once  isosorbide   mononitrate ER Tablet (IMDUR) 30 milliGRAM(s) Oral <User Schedule>  Nephro-pito 1 Tablet(s) Oral daily  NIFEdipine XL 90 milliGRAM(s) Oral <User Schedule>  NIFEdipine XL 60 milliGRAM(s) Oral <User Schedule>  pantoprazole  Injectable 40 milliGRAM(s) IV Push daily  sodium chloride 0.9% lock flush 3 milliLiter(s) IV Push every 8 hours    MEDICATIONS  (PRN):      Social History:  Cigarettes:      neg              Alchohol:        neg         Illicit Drug Abuse:  neg  neg FH CAD    ROS: Negative other than as mentioned in HPI.    Vital Signs Last 24 Hrs  T(C): 37.1 (15 Sep 2020 08:00), Max: 37.2 (15 Sep 2020 00:00)  T(F): 98.7 (15 Sep 2020 08:00), Max: 99 (15 Sep 2020 00:00)  HR: 81 (15 Sep 2020 07:00) (74 - 92)  BP: 157/70 (15 Sep 2020 07:00) (127/55 - 178/75)  BP(mean): 100 (15 Sep 2020 07:00) (93 - 108)  RR: 18 (15 Sep 2020 07:00) (16 - 26)  SpO2: 96% (15 Sep 2020 07:00) (94% - 100%)  ICU Vital Signs Last 24 Hrs  CHRISTIANO ELIZABETH  I&O's Detail    14 Sep 2020 07:01  -  15 Sep 2020 07:00  --------------------------------------------------------  IN:    IV PiggyBack: 100 mL    Oral Fluid: 240 mL    PRBCs (Packed Red Blood Cells): 665 mL  Total IN: 1005 mL    OUT:    Voided (mL): 250 mL  Total OUT: 250 mL    Total NET: 755 mL      15 Sep 2020 07:01  -  15 Sep 2020 09:28  --------------------------------------------------------  IN:    Oral Fluid: 240 mL  Total IN: 240 mL    OUT:    Voided (mL): 125 mL  Total OUT: 125 mL    Total NET: 115 mL        I&O's Summary    14 Sep 2020 07:01  -  15 Sep 2020 07:00  --------------------------------------------------------  IN: 1005 mL / OUT: 250 mL / NET: 755 mL    15 Sep 2020 07:01  -  15 Sep 2020 09:28  --------------------------------------------------------  IN: 240 mL / OUT: 125 mL / NET: 115 mL      Drug Dosing Weight  CHRISTIANO ELIZABETH      PHYSICAL EXAM:  General: Appears well developed, well nourished alert and cooperative.  HEENT: Head; normocephalic, atraumatic.  Eyes: Pupils reactive, cornea wnl.  Neck: Supple, no nodes adenopathy, no NVD or carotid bruit or thyromegaly.  CARDIOVASCULAR: Normal S1 and S2, No murmur, rub, gallop or lift.   LUNGS: No rales, rhonchi or wheeze. Normal breath sounds bilaterally.  ABDOMEN: Soft, nontender without mass or organomegaly. bowel sounds normoactive.  EXTREMITIES: No clubbing, cyanosis or edema. Distal pulses wnl.   SKIN: warm and dry with normal turgor.  NEURO: Alert/oriented x 3/normal motor exam. No pathologic reflexes.    PSYCH: normal affect.        LABS:                        7.1    6.24  )-----------( 110      ( 15 Sep 2020 05:58 )             22.5     09-15    140  |  104  |  32.0<H>  ----------------------------<  106<H>  3.9   |  26.0  |  2.99<H>    Ca    8.9      15 Sep 2020 05:57  Mg     2.0     09-15    TPro  5.9<L>  /  Alb  3.5  /  TBili  0.4  /  DBili  x   /  AST  100<H>  /  ALT  43<H>  /  AlkPhos  84      CHRISTIANO ELIZABETH  CARDIAC MARKERS ( 14 Sep 2020 20:20 )  x     / 0.10 ng/mL / 111 U/L / x     / x          PT/INR - ( 15 Sep 2020 02:01 )   PT: 12.7 sec;   INR: 1.10 ratio         PTT - ( 15 Sep 2020 02:01 )  PTT:28.7 sec  Urinalysis Basic - ( 15 Sep 2020 02:01 )    Color: Yellow / Appearance: Clear / S.010 / pH: x  Gluc: x / Ketone: Negative  / Bili: Negative / Urobili: Negative mg/dL   Blood: x / Protein: 100 mg/dL / Nitrite: Negative   Leuk Esterase: Trace / RBC: 0-2 /HPF / WBC 0-2   Sq Epi: x / Non Sq Epi: Occasional / Bacteria: Occasional        RADIOLOGY & ADDITIONAL STUDIES:    INTERPRETATION OF TELEMETRY (personally reviewed):    ECG:< from: 12 Lead ECG (20 @ 14:23) >  Diagnosis Line Sinus rhythm with 1st degree A-V block with occasional Premature ventricular complexes  Left ventricular hypertrophy with repolarization abnormality  Abnormal ECG    Confirmed by ELY SORTO ([id number]) on 2020 12:48:31 AM    < end of copied text >  < from: 12 Lead ECG (20 @ 14:23) >  Diagnosis Line Sinus rhythm with 1st degree A-V block with occasional Premature ventricular complexes  Left ventricular hypertrophy with repolarization abnormality  Abnormal ECG    Confirmed by ELY SORTO ([id number]) on 2020 12:48:31 AM    < end of copied text >      ECHO:< from: TTE Echo Complete w/ Contrast w/ Doppler (20 @ 19:33) >  Summary:   1. Left ventricular ejection fraction, by visual estimation, is 60 to 65%.   2. Normal global left ventricular systolic function.   3. There is mild concentric left ventricular hypertrophy.   4. Diastolic function indeterminate.   5. Normal RV size and function, inadequate estimation of RVSP.   6. Left atrial enlargement.   7. Moderate to severe mitral annular calcification.   8. Mitral valve mean gradient is 6.8 mmHg and PHT of 104 ms, suggestive of mild to moderate mitral stenosis.   9. Trace mitral valve regurgitation.  10. Judy-TAVR ioprosthesis in the aortic position, there is no evidence of paravalvular regurgitation. Peak velocity of 2.5 m/s, mean gradien tof 12 mmHg, HARSH (by VTI) 1.97 cm2.  11. There is no evidence of pericardial effusion.  12. Compared to the prior study from 20, interval placement of a TAVR bioprosthesis.    Reece Mc DO Electronically signed on 2020 at 9:27:08 AM            < end of copied text >

## 2020-09-15 NOTE — H&P ADULT - NSHPLABSRESULTS_GEN_ALL_CORE
CBC Full  -  ( 14 Sep 2020 20:20 )  WBC Count : 8.17 K/uL  RBC Count : 1.58 M/uL  Hemoglobin : 4.9 g/dL  Hematocrit : 16.1 %  Platelet Count - Automated : 127 K/uL  Mean Cell Volume : 101.9 fl  Mean Cell Hemoglobin : 31.0 pg  Mean Cell Hemoglobin Concentration : 30.4 gm/dL  Auto Neutrophil # : 6.75 K/uL  Auto Lymphocyte # : 0.71 K/uL  Auto Monocyte # : 0.35 K/uL  Auto Eosinophil # : 0.29 K/uL  Auto Basophil # : 0.00 K/uL  Auto Neutrophil % : 78.3 %  Auto Lymphocyte % : 8.7 %  Auto Monocyte % : 4.3 %  Auto Eosinophil % : 3.5 %  Auto Basophil % : 0.0 %

## 2020-09-15 NOTE — CONSULT NOTE ADULT - SUBJECTIVE AND OBJECTIVE BOX
REASON FOR CONSULTATION:     HPI:  87 yo M with recent TAVR 2020 with Dr. Lilly, [facility] of ESRD new to HD, Chronic Anemia, HFpEF, HTN, AS, prior GI bleed from ulcerated polyps and colonic AVMs presented to HD yesterday, was only able to tolerate 30 minutes.  Pt then had c/o dizziness and had syncopal episode at home several hours later and was taken to [facility] ED.  At time of admission, pt's Hg was 4.9, Hct 16.1.  Daughter at bedside with pt states he has undergone Colonoscopy multiple times in the last several months which were negative for bleed.  UGI also negative for source of bleeding.  Pt is followed by Dr. Baker, Hematology along with Dr. Peterson at [facility] and Dr. Avendano, Nephrology. (15 Sep 2020 02:01)      REVIEW OF SYSTEMS:  Constitutional, Eyes, ENT, Cardiovascular, Respiratory, Gastrointestinal, Genitourinary, Musculoskeletal, Integumentary, Neurological, Psychiatric, Endocrine, Heme/Lymph, and Allergic/Immunologic review of systems are otherwise negative except as noted in the HPI.    PAST MEDICAL & SURGICAL HISTORY:  GI bleeding  due to ulcerated polyps and colonic AVMs    Aortic stenosis    ESRD on dialysis  HD on  and     Risk factors for obstructive sleep apnea    Anemia    RICHARDS (dyspnea on exertion)    VT (ventricular tachycardia)    HTN (hypertension)    CAD (coronary artery disease)    Arrhythmia    AV block, 1st degree    DM (diabetes mellitus)    S/P TAVR (transcatheter aortic valve replacement)    H/O carotid endarterectomy  Right    A-V fistula  left arm 2017    H/O angioplasty  ,  no  intervention    H/O left knee surgery    H/O circumcision  at  age  65        FAMILY HISTORY:  FH: type 2 diabetes    Family history of premature CAD    Family history of lung cancer (Grandparent)    Family history of cancer (Grandparent)        SOCIAL HISTORY:    Allergies    Plavix (Hives)  Toprol-XL (Rash)    Intolerances    provide vanilla nepro TID- RD ok (Unknown)      MEDICATIONS  (STANDING):  atorvastatin 80 milliGRAM(s) Oral daily  hydrALAZINE 50 milliGRAM(s) Oral two times a day  influenza   Vaccine 0.5 milliLiter(s) IntraMuscular once  isosorbide   mononitrate ER Tablet (IMDUR) 30 milliGRAM(s) Oral <User Schedule>  Nephro-pito 1 Tablet(s) Oral daily  NIFEdipine XL 90 milliGRAM(s) Oral <User Schedule>  NIFEdipine XL 60 milliGRAM(s) Oral <User Schedule>  pantoprazole  Injectable 40 milliGRAM(s) IV Push daily  sodium chloride 0.9% lock flush 3 milliLiter(s) IV Push every 8 hours  torsemide 20 milliGRAM(s) Oral daily    MEDICATIONS  (PRN):      Vital Signs Last 24 Hrs  T(C): 37.1 (15 Sep 2020 08:00), Max: 37.2 (15 Sep 2020 00:00)  T(F): 98.7 (15 Sep 2020 08:00), Max: 99 (15 Sep 2020 00:00)  HR: 75 (15 Sep 2020 10:00) (74 - 92)  BP: 160/66 (15 Sep 2020 10:00) (127/55 - 178/75)  BP(mean): 106 (15 Sep 2020 10:00) (93 - 108)  RR: 20 (15 Sep 2020 10:00) (16 - 26)  SpO2: 99% (15 Sep 2020 10:00) (94% - 100%)    PHYSICAL EXAM:    GENERAL: NAD, well-groomed, well-developed  HEAD:  Atraumatic, Normocephalic  EYES: EOMI, PERRLA, conjunctiva and sclera clear  ENMT: No tonsillar erythema, exudates, or enlargement; Moist mucous membranes, Good dentition, No lesions  NECK: Supple, No JVD, Normal thyroid  NERVOUS SYSTEM:  Alert & Oriented X3, Good concentration; Motor Strength 5/5 B/L upper and lower extremities; DTRs 2+ intact and symmetric  CHEST/LUNG: Clear to auscultation bilaterally; No rales, rhonchi, wheezing, or rubs  HEART: Regular rate and rhythm; No murmurs, rubs, or gallops  ABDOMEN: Soft, Nontender, Nondistended; Bowel sounds present  EXTREMITIES:  2+ Peripheral Pulses, No clubbing, cyanosis, or edema  LYMPH: No lymphadenopathy noted  SKIN: No rashes or lesions      LABS:                        8.0    6.69  )-----------( 118      ( 15 Sep 2020 10:25 )             24.8     09-15    140  |  104  |  32.0<H>  ----------------------------<  106<H>  3.9   |  26.0  |  2.99<H>    Ca    8.9      15 Sep 2020 05:57  Mg     2.0     -15    TPro  5.9<L>  /  Alb  3.5  /  TBili  0.4  /  DBili  x   /  AST  100<H>  /  ALT  43<H>  /  AlkPhos  84  09-14    PT/INR - ( 15 Sep 2020 02:01 )   PT: 12.7 sec;   INR: 1.10 ratio         PTT - ( 15 Sep 2020 02:01 )  PTT:28.7 sec  Urinalysis Basic - ( 15 Sep 2020 02:01 )    Color: Yellow / Appearance: Clear / S.010 / pH: x  Gluc: x / Ketone: Negative  / Bili: Negative / Urobili: Negative mg/dL   Blood: x / Protein: 100 mg/dL / Nitrite: Negative   Leuk Esterase: Trace / RBC: 0-2 /HPF / WBC 0-2   Sq Epi: x / Non Sq Epi: Occasional / Bacteria: Occasional          RADIOLOGY & ADDITIONAL STUDIES:    PATHOLOGY:     REASON FOR CONSULTATION:     HPI:  85 yo M with recent TAVR 2020 with Dr. Lilly, [facility] of ESRD new to HD, Chronic Anemia, HFpEF, HTN, AS, prior GI bleed from ulcerated polyps and colonic AVMs presented to HD yesterday, was only able to tolerate 30 minutes.  Pt then had c/o dizziness and had syncopal episode at home several hours later and was taken to [facility] ED.  At time of admission, pt's Hg was 4.9, Hct 16.1.  Daughter at bedside with pt states he has undergone Colonoscopy multiple times in the last several months which were negative for bleed.  UGI also negative for source of bleeding.  Pt is followed by Dr. Baker, Hematology along with Dr. Peterson at [facility] and Dr. Avendano, Nephrology. (15 Sep 2020 02:01)      REVIEW OF SYSTEMS:  Constitutional, Eyes, ENT, Cardiovascular, Respiratory, Gastrointestinal, Genitourinary, Musculoskeletal, Integumentary, Neurological, Psychiatric, Endocrine, Heme/Lymph, and Allergic/Immunologic review of systems are otherwise negative except as noted in the HPI.    PAST MEDICAL & SURGICAL HISTORY:  GI bleeding  due to ulcerated polyps and colonic AVMs    Aortic stenosis    ESRD on dialysis  HD on  and     Risk factors for obstructive sleep apnea    Anemia    RICHARDS (dyspnea on exertion)    VT (ventricular tachycardia)    HTN (hypertension)    CAD (coronary artery disease)    Arrhythmia    AV block, 1st degree    DM (diabetes mellitus)    S/P TAVR (transcatheter aortic valve replacement)    H/O carotid endarterectomy  Right    A-V fistula  left arm 2017    H/O angioplasty  ,  no  intervention    H/O left knee surgery    H/O circumcision  at  age  65        FAMILY HISTORY:  FH: type 2 diabetes    Family history of premature CAD    Family history of lung cancer (Grandparent)    Family history of cancer (Grandparent)        SOCIAL HISTORY:  non smoker    Allergies    Plavix (Hives)  Toprol-XL (Rash)    Intolerances    provide vanilla nepro TID- RD ok (Unknown)      MEDICATIONS  (STANDING):  atorvastatin 80 milliGRAM(s) Oral daily  hydrALAZINE 50 milliGRAM(s) Oral two times a day  influenza   Vaccine 0.5 milliLiter(s) IntraMuscular once  isosorbide   mononitrate ER Tablet (IMDUR) 30 milliGRAM(s) Oral <User Schedule>  Nephro-pito 1 Tablet(s) Oral daily  NIFEdipine XL 90 milliGRAM(s) Oral <User Schedule>  NIFEdipine XL 60 milliGRAM(s) Oral <User Schedule>  pantoprazole  Injectable 40 milliGRAM(s) IV Push daily  sodium chloride 0.9% lock flush 3 milliLiter(s) IV Push every 8 hours  torsemide 20 milliGRAM(s) Oral daily    MEDICATIONS  (PRN):      Vital Signs Last 24 Hrs  T(C): 37.1 (15 Sep 2020 08:00), Max: 37.2 (15 Sep 2020 00:00)  T(F): 98.7 (15 Sep 2020 08:00), Max: 99 (15 Sep 2020 00:00)  HR: 75 (15 Sep 2020 10:00) (74 - 92)  BP: 160/66 (15 Sep 2020 10:00) (127/55 - 178/75)  BP(mean): 106 (15 Sep 2020 10:00) (93 - 108)  RR: 20 (15 Sep 2020 10:00) (16 - 26)  SpO2: 99% (15 Sep 2020 10:00) (94% - 100%)    PHYSICAL EXAM:    GENERAL: NAD, well-groomed,  HEAD:  Atraumatic, Normocephalic  EYES: EOMI, PERRLA,   NECK: Supple, No JVD, Normal thyroid  NERVOUS SYSTEM:  Alert & Oriented X3, Good concentration;   CHEST/LUNG: Clear to auscultation bilaterally;  HEART: Regular rate and rhythm;  ABDOMEN: Soft, Nontender,   EXTREMITIES:no edema  LYMPH: No lymphadenopathy noted  SKIN: No rashes or lesions      LABS:                        8.0    6.69  )-----------( 118      ( 15 Sep 2020 10:25 )             24.8     09-15    140  |  104  |  32.0<H>  ----------------------------<  106<H>  3.9   |  26.0  |  2.99<H>    Ca    8.9      15 Sep 2020 05:57  Mg     2.0     09-15    TPro  5.9<L>  /  Alb  3.5  /  TBili  0.4  /  DBili  x   /  AST  100<H>  /  ALT  43<H>  /  AlkPhos  84  09-14    PT/INR - ( 15 Sep 2020 02:01 )   PT: 12.7 sec;   INR: 1.10 ratio         PTT - ( 15 Sep 2020 02:01 )  PTT:28.7 sec  Urinalysis Basic - ( 15 Sep 2020 02:01 )    Color: Yellow / Appearance: Clear / S.010 / pH: x  Gluc: x / Ketone: Negative  / Bili: Negative / Urobili: Negative mg/dL   Blood: x / Protein: 100 mg/dL / Nitrite: Negative   Leuk Esterase: Trace / RBC: 0-2 /HPF / WBC 0-2   Sq Epi: x / Non Sq Epi: Occasional / Bacteria: Occasional          RADIOLOGY & ADDITIONAL STUDIES:    PATHOLOGY:

## 2020-09-15 NOTE — PHYSICAL THERAPY INITIAL EVALUATION ADULT - PERTINENT HX OF CURRENT PROBLEM, REHAB EVAL
s/p recent TAVR, ESRD on HD, long standing anemia, known colonic AVMs with recent multiple negative EGD/colonscopy admitted with sx anemia and Hgb 4 w/o evidence of bleeding.

## 2020-09-15 NOTE — H&P ADULT - ASSESSMENT
87 yo M with recent TAVR 8/21/2020 with Dr. Lilly, [facility] of ESRD new to HD, Anemia, FHFpEF, HTN, AS, prior GI bleed from ulcerated polyps and colonic AVMs presented to HD yesterday, was only able to tolerate 30 minutes then became hypotensive and was transferred to [facility] ED.  At time of admission, pt's Hg was 4.9, Hct 16.1.  Daughter at bedside with pt states he has undergone Colonoscopy multiple times in the last several months which were negative for bleed.  UGI also negative for source of bleeding.    Case d/w Dr. Rojas.  Plan to admit to ICU, transfuse 2 RBCs with lasix, Monitor CBC q 6 hrs, will consult with GI, Nephrology, Hematology and Neurology in am. 85 yo M with recent TAVR 8/21/2020 with Dr. Lilly, [facility] of ESRD new to HD, Chronic Anemia, HFpEF, HTN, AS, prior GI bleed from ulcerated polyps and colonic AVMs presented to HD yesterday, was only able to tolerate 30 minutes.  Pt then had c/o dizziness and had syncopal episode at home several hours later and was taken to [facility] ED  At time of admission, pt's Hg was 4.9, Hct 16.1.  Daughter at bedside with pt states he has undergone Colonoscopy multiple times in the last several months which were negative for bleed.  UGI also negative for source of bleeding.    Case d/w Dr. Rojas.  Plan to admit to ICU, transfuse 2 RBCs with lasix, Monitor CBC q 6 hrs, will consult with GI, Nephrology, Hematology and Neurology in am.

## 2020-09-15 NOTE — PROGRESS NOTE ADULT - SUBJECTIVE AND OBJECTIVE BOX
ICU Vital Signs Last 24 Hrs,    T(C): 37.1 (15 Sep 2020 08:00), Max: 37.2 (15 Sep 2020 00:00)  T(F): 98.7 (15 Sep 2020 08:00), Max: 99 (15 Sep 2020 00:00)  HR: 75 (15 Sep 2020 10:00) (74 - 92)  BP: 160/66 (15 Sep 2020 10:00) (127/55 - 178/75)  BP(mean): 106 (15 Sep 2020 10:00) (93 - 108)  RR: 20 (15 Sep 2020 10:00) (16 - 26)  SpO2: 99% (15 Sep 2020 10:00) (94% - 100%)    140    |  104    |  32.0<H>  ----------------------------<  106<H>  Ca:8.9   (15 Sep 2020 05:57)  3.9     |  26.0   |  2.99<H>    eGFR if Non : 18 <L>    TPro  5.9<L>  /  Alb  3.5    /  TBili  0.4    /  DBili  x      /  AST  100<H>  /  ALT  43<H>  /  AlkPhos  84     14 Sep 2020 20:20                        8.0<L>  6.69  )-----------( 118<L>    ( 15 Sep 2020 10:25 )             24.8<L>    M.0 mg/dL (15 @ 05:57)    Urinalysis Basic - ( 15 Sep 2020 02:01 )  Color: Yellow / Appearance: Clear / S.010 / pH: x  Gluc: x / Ketone: Negative  / Bili: Negative / Urobili: Negative mg/dL   Blood: x / Protein: 100 mg/dL<!> / Nitrite: Negative   Leuk Esterase: Trace<!> / RBC: 0-2 /HPF / WBC 0-2   Sq Epi: x / Non Sq Epi: Occasional / Bacteria: Occasional<!>    S/P TAVR,    DM - 2 w. CKD, DMN, Neuropathy ( painful )    AVF +    Refracory , AMY Resistant anemia,    ? Melodysplasia ( D/W Dr. Ulloa )    Dr. Reyes torres,  HD " On - Hold ",    Will attempt non - Dialysis Conservative care, per family wishes,     D/W [facility] [employer] [facility],

## 2020-09-15 NOTE — PHYSICAL THERAPY INITIAL EVALUATION ADULT - ADDITIONAL COMMENTS
Pt reports living in a house with his wife who is available to assist 24/7 if needed. Pt also reports he has a son and 2 daughters that assist with transportation to appointments and whatever else it may be. Pt has 3 JESSIAC with bilateral handrails, and a flight down to the basement where laundry is located with 1 handrail. Pt reports owning SAC, RW, and has ordered a shower chair but has not arrived yet. Pt reports he does not normally use and AD for mobility but recently it has been the RW.

## 2020-09-15 NOTE — CONSULT NOTE ADULT - ASSESSMENT
The patient is a 86y Male with multiple medical issues including neuropathic pain from diabetic peripheral neuropathy.    Neuropathy with pain.   Had side effects from Gabapentin.  Would suggest Duloxetine 30 mg daily instead.     Anemia.  Work up and management per medicine/renal.    Case discussed with CICU team (Dr Lilly attending) and with Dr Avendano.

## 2020-09-15 NOTE — CHART NOTE - NSCHARTNOTEFT_GEN_A_CORE
CTS Transfer Acceptance Note to 4 CTU from CTICU    Briefly, 87 yo M with recent TAVR 8/21/2020 with Dr. Lilly, [facility] of ESRD new to HD, Chronic Anemia, HFpEF, HTN, AS, prior GI bleed from ulcerated polyps and colonic AVMs presented to HD yesterday, was only able to tolerate 30 minutes.  Pt then had c/o dizziness and had syncopal episode at home several hours later and was taken to [facility] ED  At time of admission, pt's Hg was 4.9, Hct 16.1.  Daughter at bedside with pt states he has undergone Colonoscopy multiple times in the last several months which were negative for bleed.  UGI also negative for source of bleeding.    Patient seen and examined. Vitals stable. Medications, radiologic and laboratory results all reviewed by me. Patient seated in chair, NAD. Denies CP, SOB.    Plan:  - GI and Heme f/u  - AM labs  - Consider bone marrow bx  - No plan per GI for scope at this time  - Brilinta/ASA held at this time  - Monitor BP    Discussed with CTICU ACP.

## 2020-09-15 NOTE — H&P ADULT - PROBLEM SELECTOR PLAN 1
Transfusing 2 uts RBCs with Lasix after first unit  Monitor CBC q 6 hrs  Will consult with GI, Hematology in am.

## 2020-09-15 NOTE — H&P ADULT - HISTORY OF PRESENT ILLNESS
87 yo M with recent TAVR 8/21/2020 with Dr. Lilly, [facility] of ESRD new to HD, Anemia, FHFpEF, HTN, AS, prior GI bleed from ulcerated polyps and colonic AVMs presented to HD yesterday, was only able to tolerate 30 minutes then became hypotensive and was transferred to [facility] ED.  At time of admission, pt's Hg was 4.9, Hct 16.1.  Daughter at bedside with pt states he has undergone Colonoscopy multiple times in the last several months which were negative for bleed.  UGI also negative for source of bleeding.  Pt is followed by Dr. Baker, Hematology along with Dr. Peterson at [facility] and Dr. Avendano, Nephrology. 85 yo M with recent TAVR 8/21/2020 with Dr. Lilly, [facility] of ESRD new to HD, Chronic Anemia, HFpEF, HTN, AS, prior GI bleed from ulcerated polyps and colonic AVMs presented to HD yesterday, was only able to tolerate 30 minutes.  Pt then had c/o dizziness and had syncopal episode at home several hours later and was taken to [facility] ED.  At time of admission, pt's Hg was 4.9, Hct 16.1.  Daughter at bedside with pt states he has undergone Colonoscopy multiple times in the last several months which were negative for bleed.  UGI also negative for source of bleeding.  Pt is followed by Dr. Baker, Hematology along with Dr. Peterson at [facility] and Dr. Avendano, Nephrology.

## 2020-09-15 NOTE — PHYSICAL THERAPY INITIAL EVALUATION ADULT - NEUROVASCULAR ASSESSMENT RLE
Pt reports pins & needs in Right foot secondary to neuropathy, sesnsation intact to light touch. Reporting its very sensitive

## 2020-09-15 NOTE — H&P ADULT - PROBLEM SELECTOR PLAN 5
TAWANDA AHN Pt and daughter state pt's peripheral neuropathy has recently worsened.  He has not been able to tolerate gabapentin in the past.  Family is requesting Neurology consult with Dr. Jones/office if possible during this hospital stay.

## 2020-09-15 NOTE — CONSULT NOTE ADULT - ASSESSMENT
Chief Complaint   Patient presents with   • Hepatitis C     Started 8 weeks Mavyret on 2/16/18       ENDO PROCEDURE ORDERED:    Subjective    Margueritejuan luis Rob is a 59 y.o. female. she is here today for follow-up.    History of Present Illness    SUBJECTIVE:  The patient was seen on recheck of her chronic active hepatitis C, GERD, abdominal pain, currently on treatment.  Last seen on 02/15/2018.  Mary type 2B, F0, treatment naive.  She was started on 8 weeks of Mavyret.  She states, overall, she is doing well.  Her GERD is still well controlled on the Prilosec 20 mg daily.  She denied nausea, vomiting, dysphagia.  She is on Elavil and Senakot for her bowels.  No blood in her stool.  Weight is up 3.2 pounds since last visit.  Last colonoscopy 02/08/2016.    Laboratories 03/02/2018:  CMP showed glucose 148, otherwise normal.  Normal CBC.    Laboratories on 03/16/2018:  Normal TSH, CBC.  CMP showed a glucose of 162, otherwise normal.  HCV RNA by PCR quantitative was not detected.    ASSESSMENT/PLAN:  Patient with chronic active hepatitis C, appears stable on current regimen.  She is tolerating the medications without significant difficulty.  She will be due for hepatoma screening in June 2018.  We will plan follow up after the above, further pending clinical course and the results of the above.  She does show good results with good response to treatment.       The following portions of the patient's history were reviewed and updated as appropriate:   Past Medical History:   Diagnosis Date   • Alcoholic fatty liver    • Alkaline phosphatase raised    • Anxiety    • Asthma     IgE-MEDIATED ALLERGIC ASTHMA   • Backache     CHRONIC   • CHF (congestive heart failure)    • Chronic hepatitis C     2b. Fibrosure .05/F0, necroinflam .14/A0. Repeat .05/F0, .13/A0      • Chronic neck pain    • Constipation    • COPD (chronic obstructive pulmonary disease)    • Diabetes    • Diarrhea    • Elevated levels of transaminase & lactic  acid dehydrogenase    • Emphysema, unspecified    • Generalized abdominal pain    • GERD (gastroesophageal reflux disease)     WITH ESOPHAGITIS   • Hepatitis    • High risk sexual behavior    • Hypertension    • Hypokalemia    • Irritable bowel syndrome (IBS)    • Multiple joint pain    • Need for vaccination    • On long term drug therapy    • Shoulder pain    • Tobacco dependence syndrome      Past Surgical History:   Procedure Laterality Date   • APPENDECTOMY     • APPENDECTOMY     • BACK SURGERY     • CHOLECYSTECTOMY     • CHOLECYSTECTOMY     • COLONOSCOPY  02/08/2016   • COLONOSCOPY W/ POLYPECTOMY  02/08/2016    External and internal hemorrhoids.The examination was otherwise normal.Fluid aspiration performed.Several biopsies obtained in the entire colon.   • ENDOSCOPY  02/08/2016    Mildly severe esophagitis.Gastritis.Normal examined duodenum.Several biopsies obtained in the lower third of the esophagus.Several biopsies obtained in the gastric antrum.Several biopsies obtained in the first part of the duodenum.   • ENDOSCOPY AND COLONOSCOPY  08/13/2012    Internal & external hemorrhoids found. Scope could not pass into the TI.   • ENDOSCOPY W/ PEG TUBE PLACEMENT  08/13/2012    Esophagitis seen. Biopsy taken. Gastritis in stomach. Biopsy taken. Normal duodenum. Biopsy taken.   • HEMORRHOIDECTOMY     • HERNIA REPAIR     • INJECTION OF MEDICATION  08/01/2013    METHYLPREDNISONE, X2   • INJECTION OF MEDICATION  11/03/2011    KENALOG   • INJECTION OF MEDICATION  02/24/2011    ROCEPHIN   • TONSILLECTOMY AND ADENOIDECTOMY     • TOTAL ABDOMINAL HYSTERECTOMY WITH SALPINGO OOPHORECTOMY     • UPPER GASTROINTESTINAL ENDOSCOPY  02/08/2016     Family History   Problem Relation Age of Onset   • Coronary artery disease Mother    • Diabetes Mother    • Heart failure Mother    • Cancer Father    • Diabetes Father    • Heart failure Father    • Thyroid disease Father    • Breast cancer Sister    • Cancer Other      COLORECTAL   •  Endometrial cancer Other    • Ovarian cancer Other    • Diabetes Brother      OB History     No data available        Allergies   Allergen Reactions   • Doxycycline Itching   • Penicillins Rash     Social History     Social History   • Marital status:      Social History Main Topics   • Smoking status: Current Every Day Smoker     Packs/day: 0.25     Types: Cigarettes   • Smokeless tobacco: Never Used   • Alcohol use No   • Drug use: No   • Sexual activity: Defer     Other Topics Concern   • Not on file       Current Outpatient Prescriptions:   •  albuterol (PROVENTIL HFA;VENTOLIN HFA) 108 (90 BASE) MCG/ACT inhaler, Inhale 2 puffs Every 4 (Four) Hours As Needed for wheezing., Disp: , Rfl:   •  albuterol (PROVENTIL) (5 MG/ML) 0.5% nebulizer solution, Take 2.5 mg by nebulization Every 6 (Six) Hours As Needed for wheezing., Disp: , Rfl:   •  amitriptyline (ELAVIL) 100 MG tablet, Take 100 mg by mouth Every Night., Disp: , Rfl:   •  amLODIPine (NORVASC) 5 MG tablet, Take 5 mg by mouth Daily., Disp: , Rfl:   •  benzonatate (TESSALON) 100 MG capsule, Take 100 mg by mouth 3 (Three) Times a Day As Needed for Cough., Disp: , Rfl:   •  budesonide-formoterol (SYMBICORT) 160-4.5 MCG/ACT inhaler, Inhale 2 puffs 2 (Two) Times a Day., Disp: , Rfl:   •  cetirizine (zyrTEC) 10 MG tablet, Take 10 mg by mouth Daily., Disp: , Rfl:   •  diphenoxylate-atropine (LOMOTIL) 2.5-0.025 MG per tablet, Take 1 tablet by mouth 4 (Four) Times a Day As Needed for Diarrhea., Disp: , Rfl:   •  fluconazole (DIFLUCAN) 100 MG tablet, Take 100 mg by mouth 1 (One) Time Per Week., Disp: , Rfl:   •  fluticasone (FLONASE) 50 MCG/ACT nasal spray, 1 spray into each nostril Daily., Disp: , Rfl:   •  furosemide (LASIX) 20 MG tablet, Take 20 mg by mouth Daily., Disp: , Rfl:   •  Glecaprevir-Pibrentasvir 100-40 MG tablet, Take 3 tablets by mouth Daily., Disp: 84 tablet, Rfl: 1  •  HYDROcodone-acetaminophen (NORCO) 7.5-325 MG per tablet, Take 1 tablet by  "mouth 3 (Three) Times a Day., Disp: , Rfl:   •  JANUMET XR  MG tablet sustained-release 24 hour, Take  by mouth 2 (Two) Times a Day., Disp: , Rfl:   •  montelukast (SINGULAIR) 10 MG tablet, Take 1 tablet by mouth Every Night., Disp: 30 tablet, Rfl: 11  •  omeprazole (priLOSEC) 20 MG capsule, Take 1 capsule by mouth Daily., Disp: 30 capsule, Rfl: 1  •  potassium chloride (K-DUR,KLOR-CON) 10 MEQ CR tablet, Take 10 mEq by mouth 2 (Two) Times a Day., Disp: , Rfl:   •  pravastatin (PRAVACHOL) 20 MG tablet, Take 20 mg by mouth Every Night., Disp: , Rfl:   •  sennosides-docusate sodium (SENOKOT-S) 8.6-50 MG tablet, Take 2 tablets by mouth Every Night., Disp: , Rfl:   Review of Systems  Review of Systems       Objective    /72 (BP Location: Right arm)   Pulse 114   Ht 157.4 cm (61.97\")   Wt 72.6 kg (160 lb)   BMI 29.29 kg/m²   Physical Exam   Constitutional: She is oriented to person, place, and time. She appears well-developed and well-nourished. No distress.   HENT:   Head: Normocephalic and atraumatic.   Eyes: EOM are normal. Pupils are equal, round, and reactive to light.   Neck: Normal range of motion.   Cardiovascular: Normal rate, regular rhythm and normal heart sounds.    Pulmonary/Chest: Effort normal and breath sounds normal.   Abdominal: Soft. Bowel sounds are normal. She exhibits no shifting dullness, no distension, no abdominal bruit, no ascites and no mass. There is no hepatosplenomegaly. There is tenderness. There is no rigidity, no rebound, no guarding and no CVA tenderness. No hernia. Hernia confirmed negative in the ventral area.   Mild diffuse   Musculoskeletal: Normal range of motion.   Neurological: She is alert and oriented to person, place, and time.   Skin: Skin is warm and dry.   Psychiatric: She has a normal mood and affect. Her behavior is normal. Judgment and thought content normal.   Nursing note and vitals reviewed.    Assessment/Plan      1. Chronic hepatitis C without hepatic " Assessment  Sx profound anemia w/o overt bleeding ?myelodysplasia  nonobst CAD normal EF  As s/p successful TAVR with excellent result  1st degree AV block coma    2. TORRES (nonalcoholic steatohepatitis)    3. Gastroesophageal reflux disease with esophagitis    .   Marguerite was seen today for hepatitis c.    Diagnoses and all orders for this visit:    Chronic hepatitis C without hepatic coma  Comments:  2b  Orders:  -     CBC Auto Differential; Future  -     Comprehensive Metabolic Panel; Future  -     Comprehensive Metabolic Panel; Future  -     Cancel: Hemochromatosis Mutation; Future  -     TSH; Future  -     HCV RT-PCR,Quant(Non-Graph); Future  -     CBC (No Diff); Future    TORRES (nonalcoholic steatohepatitis)    Gastroesophageal reflux disease with esophagitis        Orders placed during this encounter include:  Orders Placed This Encounter   Procedures   • CBC Auto Differential     Due 3/30/18     Standing Status:   Future     Standing Expiration Date:   4/4/2018   • Comprehensive Metabolic Panel     Due 3/30/18     Standing Status:   Future     Standing Expiration Date:   4/4/2018   • Comprehensive Metabolic Panel     Due 4/20/18     Standing Status:   Future     Standing Expiration Date:   4/25/2018   • TSH     Due 4/20/18     Standing Status:   Future     Standing Expiration Date:   4/25/2018   • CBC (No Diff)     Standing Status:   Future     Standing Expiration Date:   5/4/2018       Medications prescribed:  No orders of the defined types were placed in this encounter.    Discontinued Medications       Reason for Discontinue    gemfibrozil (LOPID) 600 MG tablet Discontinued by another clinician        Requested Prescriptions      No prescriptions requested or ordered in this encounter       Review and/or summary of lab tests, radiology, procedures, medications. Review and summary of old records and obtaining of history. The risks and benefits of my recommendations, as well as other treatment options were discussed with the patient today. Questions were answered.    Follow-up: Return in about 5 weeks (around 4/26/2018), or if symptoms worsen or fail to improve.      * Surgery not found *      This document has been electronically signed by Genaro Franco PA-C on March 25, 2018 12:47 PM      Results for orders placed or performed in visit on 03/16/18   HCV RT-PCR,Quant(Non-Graph)   Result Value Ref Range    Hepatitis C Quantitation HCV Not Detected IU/mL    Test Information Comment    CBC Auto Differential   Result Value Ref Range    WBC 7.24 3.20 - 9.80 10*3/mm3    RBC 4.97 3.77 - 5.16 10*6/mm3    Hemoglobin 13.3 12.0 - 15.5 g/dL    Hematocrit 39.2 35.0 - 45.0 %    MCV 78.9 (L) 80.0 - 98.0 fL    MCH 26.8 26.5 - 34.0 pg    MCHC 33.9 31.4 - 36.0 g/dL    RDW 13.7 11.5 - 14.5 %    RDW-SD 38.6 36.4 - 46.3 fl    MPV 10.5 8.0 - 12.0 fL    Platelets 243 150 - 450 10*3/mm3    Neutrophil % 58.6 37.0 - 80.0 %    Lymphocyte % 29.8 10.0 - 50.0 %    Monocyte % 7.2 0.0 - 12.0 %    Eosinophil % 3.2 0.0 - 7.0 %    Basophil % 0.6 0.0 - 2.0 %    Immature Grans % 0.6 (H) 0.0 - 0.5 %    Neutrophils, Absolute 4.25 2.00 - 8.60 10*3/mm3    Lymphocytes, Absolute 2.16 0.60 - 4.20 10*3/mm3    Monocytes, Absolute 0.52 0.00 - 0.90 10*3/mm3    Eosinophils, Absolute 0.23 0.00 - 0.70 10*3/mm3    Basophils, Absolute 0.04 0.00 - 0.20 10*3/mm3    Immature Grans, Absolute 0.04 (H) 0.00 - 0.02 10*3/mm3   TSH   Result Value Ref Range    TSH 2.470 0.460 - 4.680 mIU/mL   Comprehensive Metabolic Panel   Result Value Ref Range    Glucose 162 (H) 60 - 100 mg/dL    BUN 11 7 - 21 mg/dL    Creatinine 0.56 0.50 - 1.00 mg/dL    Sodium 140 137 - 145 mmol/L    Potassium 4.3 3.5 - 5.1 mmol/L    Chloride 100 95 - 110 mmol/L    CO2 26.0 22.0 - 31.0 mmol/L    Calcium 9.8 8.4 - 10.2 mg/dL    Total Protein 7.6 6.3 - 8.6 g/dL    Albumin 4.40 3.40 - 4.80 g/dL    ALT (SGPT) 39 9 - 52 U/L    AST (SGOT) 25 14 - 36 U/L    Alkaline Phosphatase 125 38 - 126 U/L    Total Bilirubin 0.4 0.2 - 1.3 mg/dL    eGFR Non African Amer 111 >60 mL/min/1.73    Globulin 3.2 2.3 - 3.5 gm/dL    A/G Ratio 1.4 1.1 - 1.8 g/dL    BUN/Creatinine Ratio 19.6  7.0 - 25.0    Anion Gap 14.0 5.0 - 15.0 mmol/L   Results for orders placed or performed in visit on 03/02/18   Comprehensive Metabolic Panel   Result Value Ref Range    Glucose 145 (H) 60 - 100 mg/dL    BUN 12 7 - 21 mg/dL    Creatinine 0.49 (L) 0.50 - 1.00 mg/dL    Sodium 140 137 - 145 mmol/L    Potassium 4.1 3.5 - 5.1 mmol/L    Chloride 99 95 - 110 mmol/L    CO2 24.0 22.0 - 31.0 mmol/L    Calcium 9.8 8.4 - 10.2 mg/dL    Total Protein 7.7 6.3 - 8.6 g/dL    Albumin 4.50 3.40 - 4.80 g/dL    ALT (SGPT) 49 9 - 52 U/L    AST (SGOT) 23 14 - 36 U/L    Alkaline Phosphatase 122 38 - 126 U/L    Total Bilirubin 0.3 0.2 - 1.3 mg/dL    eGFR Non African Amer 130 >60 mL/min/1.73    Globulin 3.2 2.3 - 3.5 gm/dL    A/G Ratio 1.4 1.1 - 1.8 g/dL    BUN/Creatinine Ratio 24.5 7.0 - 25.0    Anion Gap 17.0 (H) 5.0 - 15.0 mmol/L   Results for orders placed or performed in visit on 03/02/18   CBC Auto Differential   Result Value Ref Range    WBC 6.96 3.20 - 9.80 10*3/mm3    RBC 5.07 3.77 - 5.16 10*6/mm3    Hemoglobin 13.4 12.0 - 15.5 g/dL    Hematocrit 40.0 35.0 - 45.0 %    MCV 78.9 (L) 80.0 - 98.0 fL    MCH 26.4 (L) 26.5 - 34.0 pg    MCHC 33.5 31.4 - 36.0 g/dL    RDW 13.4 11.5 - 14.5 %    RDW-SD 38.0 36.4 - 46.3 fl    MPV 10.5 8.0 - 12.0 fL    Platelets 236 150 - 450 10*3/mm3    Neutrophil % 63.3 37.0 - 80.0 %    Lymphocyte % 25.3 10.0 - 50.0 %    Monocyte % 6.5 0.0 - 12.0 %    Eosinophil % 3.6 0.0 - 7.0 %    Basophil % 0.7 0.0 - 2.0 %    Immature Grans % 0.6 (H) 0.0 - 0.5 %    Neutrophils, Absolute 4.41 2.00 - 8.60 10*3/mm3    Lymphocytes, Absolute 1.76 0.60 - 4.20 10*3/mm3    Monocytes, Absolute 0.45 0.00 - 0.90 10*3/mm3    Eosinophils, Absolute 0.25 0.00 - 0.70 10*3/mm3    Basophils, Absolute 0.05 0.00 - 0.20 10*3/mm3    Immature Grans, Absolute 0.04 (H) 0.00 - 0.02 10*3/mm3   Results for orders placed or performed in visit on 01/24/18   HCV RT-PCR,Quant(Non-Graph)   Result Value Ref Range    Hepatitis C Quantitation [id number] IU/mL     HCV log10 6.342 log10 IU/mL    Test Information Comment    CBC Auto Differential   Result Value Ref Range    WBC 5.23 3.20 - 9.80 10*3/mm3    RBC 5.22 (H) 3.77 - 5.16 10*6/mm3    Hemoglobin 14.0 12.0 - 15.5 g/dL    Hematocrit 42.3 35.0 - 45.0 %    MCV 81.0 80.0 - 98.0 fL    MCH 26.8 26.5 - 34.0 pg    MCHC 33.1 31.4 - 36.0 g/dL    RDW 14.0 11.5 - 14.5 %    RDW-SD 41.4 36.4 - 46.3 fl    MPV 10.8 8.0 - 12.0 fL    Platelets 222 150 - 450 10*3/mm3    Neutrophil % 47.5 37.0 - 80.0 %    Lymphocyte % 38.0 10.0 - 50.0 %    Monocyte % 9.9 0.0 - 12.0 %    Eosinophil % 2.5 0.0 - 7.0 %    Basophil % 1.0 0.0 - 2.0 %    Immature Grans % 1.1 (H) 0.0 - 0.5 %    Neutrophils, Absolute 2.48 2.00 - 8.60 10*3/mm3    Lymphocytes, Absolute 1.99 0.60 - 4.20 10*3/mm3    Monocytes, Absolute 0.52 0.00 - 0.90 10*3/mm3    Eosinophils, Absolute 0.13 0.00 - 0.70 10*3/mm3    Basophils, Absolute 0.05 0.00 - 0.20 10*3/mm3    Immature Grans, Absolute 0.06 (H) 0.00 - 0.02 10*3/mm3    nRBC 0.0 0.0 - 0.0 /100 WBC     *Note: Due to a large number of results and/or encounters for the requested time period, some results have not been displayed. A complete set of results can be found in Results Review.       Some portions of this note have been dictated using voice recognition software and may contain errors and/or omissions.    Assessment  Sx profound anemia w/o syncope, no overt bleeding ?myelodysplasia  nonobst CAD normal EF  As s/p successful TAVR with excellent result  1st degree AV block  ESRD on HD presently with improved indices      Rec  cont diuretics as per neuro  'cont telemetry  agree with heme evaluation for BM biopsy  will follow

## 2020-09-15 NOTE — CONSULT NOTE ADULT - SUBJECTIVE AND OBJECTIVE BOX
[facility]                                        [facility] [place]                                  [address] [address]                                      [address]                                           [place], [place], [address]                                                [phone]        CC: diabetic peripheral neuropathy     HISTORY:  The patient is a 86y Male with multiple medical issues including Diabetes Mellitus with complications of renal failure now on hemodialysis and neuropathy.   He was admitted secondary to severe anemia. He is on CICU service as he is within 30 days of TAVR.   He reports a history of numbness and burning sensations in his feet.   This has been going on for a few years but has been worse for several months.   He had been started on Gabapentin 100 mg but had severe side effects including delirium after one or two doses. He had fallen on coming home from hemodialysis and was brought to the ER where the severe anemia was found.   Neurology is called for any other recommendations.      PAST MEDICAL & SURGICAL HISTORY:  GI bleeding  due to ulcerated polyps and colonic AVMs  Aortic stenosis  ESRD on dialysis  HD on Mondays and Fridays  Risk factors for obstructive sleep apnea  Anemia  RICHARDS (dyspnea on exertion)  VT (ventricular tachycardia)  HTN (hypertension)  CAD (coronary artery disease)  Arrhythmia  AV block, 1st degree  DM (diabetes mellitus)  S/P TAVR (transcatheter aortic valve replacement)  H/O carotid endarterectomy  Right  A-V fistula  left arm 5/2017  H/O angioplasty  2013,  no  intervention  H/O left knee surgery  H/O circumcision  at  age  65      MEDICATIONS  (STANDING):  atorvastatin 80 milliGRAM(s) Oral daily  darbepoetin Injectable ViaL 200 MICROGram(s) SubCutaneous every 7 days  doxazosin 1 milliGRAM(s) Oral at bedtime  hydrALAZINE 50 milliGRAM(s) Oral every 8 hours  influenza   Vaccine 0.5 milliLiter(s) IntraMuscular once  iron sucrose IVPB 100 milliGRAM(s) IV Intermittent every 7 days  isosorbide   mononitrate ER Tablet (IMDUR) 30 milliGRAM(s) Oral <User Schedule>  Nephro-pito 1 Tablet(s) Oral daily  NIFEdipine XL 90 milliGRAM(s) Oral <User Schedule>  NIFEdipine XL 60 milliGRAM(s) Oral <User Schedule>  pantoprazole  Injectable 40 milliGRAM(s) IV Push daily  sodium chloride 0.9% lock flush 3 milliLiter(s) IV Push every 8 hours  torsemide 20 milliGRAM(s) Oral daily      Allergies  Plavix (Hives)  Toprol-XL (Rash)    Intolerances  provide vanilla nepro TID- RD ok (Unknown)      SOCIAL HISTORY:  Non smoker.     FAMILY HISTORY:  FH: type 2 diabetes  Family history of premature CAD  Family history of lung cancer (Grandparent)  Family history of cancer (Grandparent)  No known history of stroke (Mother/Father/Sibling).       ROS:  Constitutional: The patient denies fevers or weight changes.  Neuro: As per HPI.  Eyes: Denies blurry vision.  Ears/nose/throat: Denies Tinnitus.   Cardiac: Denies chest pain. Denies palpitations.  Respiratory: Denies shortness of breath.  GI: Denies abdominal pain, nausea, or vomiting.  : Denies change in urinary pattern.  Integumentary: Denies rash.  Psych: Denies recent mood changes.  Heme: denies easy bleeding/bruising.    Exam:  Vital Signs Last 24 Hrs  T(C): 37.5 (15 Sep 2020 12:00), Max: 37.5 (15 Sep 2020 12:00)  T(F): 99.5 (15 Sep 2020 12:00), Max: 99.5 (15 Sep 2020 12:00)  HR: 78 (15 Sep 2020 13:24) (74 - 92)  BP: 160/71 (15 Sep 2020 13:24) (127/55 - 188/4)  BP(mean): 106 (15 Sep 2020 10:00) (93 - 108)  RR: 18 (15 Sep 2020 13:24) (16 - 26)  SpO2: 98% (15 Sep 2020 13:24) (94% - 100%)  General: NAD.   Carotid bruits absent.     Mental status: The patient is awake, alert, and fully oriented. There is no aphasia. Attention span is normal. Patient is aware of current events.     Cranial nerves: There is no papilledema (direct ophthalmoscope). Pupils react symmetrically to light. There is no visual field deficit to confrontation. Extraocular motion is full with no nystagmus. There is no ptosis. Facial sensation is intact. Facial musculature is symmetric. Palate elevates symmetrically. Tongue is midline.    Motor: There is normal bulk and tone.  Strength is 5/5 in the right arm and leg.   Strength is 5/5 in the left arm and leg.    Sensation: Decreased vibration and JPS in toes.     Reflexes: Trace throughout except ankles absent and plantar responses are flexor.    Cerebellar: There is no dysmetria on finger to nose testing.    LABS:                         8.0    6.69  )-----------( 118      ( 15 Sep 2020 10:25 )             24.8       09-15    140  |  104  |  32.0<H>  ----------------------------<  106<H>  3.9   |  26.0  |  2.99<H>    Ca    8.9      15 Sep 2020 05:57  Mg     2.0     09-15    TPro  5.9<L>  /  Alb  3.5  /  TBili  0.4  /  DBili  x   /  AST  100<H>  /  ALT  43<H>  /  AlkPhos  84  09-14      PT/INR - ( 15 Sep 2020 02:01 )   PT: 12.7 sec;   INR: 1.10 ratio    PTT - ( 15 Sep 2020 02:01 )  PTT:28.7 sec    RADIOLOGY   CT head images reviewed (and concur with report): There is no acute pathology.

## 2020-09-15 NOTE — H&P ADULT - PROBLEM SELECTOR PLAN 2
Problem: Patient Care Overview (Adult)  Goal: Adult Individualization and Mutuality  1. Chest Port  2. Likes warm blankets  3. Listens to music during treatment         Outcome: Ongoing (interventions implemented as appropriate)  0832 -- Patient's labs, history, allergies, and medication reviewed.  Assessment complete.  Vital signs stable.  Patient to receive Optivo/Xgeva.  Discussed plan of care with patient.  Patient in agreement. Chair reclined.  Warm blanket and snack offered.  Will monitor.           Pt states he was recently told by Dr. Avendano that he may now longer need HD.  Will verify with Nephrology Team in am, plan going forward

## 2020-09-15 NOTE — CONSULT NOTE ADULT - ASSESSMENT
Severe recurrent anemia.  Distantly observed with bleeding.  None recently apparent.  Certainly large hemorrhoids if bleeding would be noticed.    Distant hx of AVM's of the colon:  Not recently seen on most recent exam.    Possible hematologic reason for anemia:  ? MDS.  For heme evaluation.    Not a candidate for CTE with renal failure.    If heme eval is negative, would suggest Capsule Endoscopy on outpt basis via GI office. No plans to repeat EGD and colonoscopy as recently done and good studies.

## 2020-09-15 NOTE — PATIENT PROFILE ADULT - IS THERE A SUSPICION OF ABUSE/NEGLIGENCE?
no Vital signs reviewed  GENERAL: Frail elderly female, nontoxic appearing, NAD  HEAD: NCAT  EYES: PERRL, EOMI  ENT: MMM  NECK: Supple, non tender  RESPIRATORY: Normal respiratory effort. CTA B/L. No wheezing, rales, rhonchi  CARDIOVASCULAR: Regular rate and rhythm.   ABDOMEN: Soft. Nondistended. Nontender. No guarding or rebound  MUSCULOSKELETAL/EXTREMITIES: Brisk cap refill. 2+ radial pulses. Moving all extremities. No midline tenderness.  SKIN:  Warm and dry  NEURO: Awake, alert, GCS 15. Ambulating in ED with assistance. No gross FND.  PSYCHIATRIC: Cooperative. Affect appropriate.

## 2020-09-15 NOTE — CONSULT NOTE ADULT - SUBJECTIVE AND OBJECTIVE BOX
HPI:  85 yo M with recent TAVR 2020 with Dr. Lilly, [facility] of ESRD new to HD,2x/week,  Chronic Anemia, HFpEF, HTN, AS, prior GI bleed from ulcerated polyps and colonic AVMs presented to HD yesterday, was only able to tolerate 30 minutes.  Pt then had c/o dizziness and had near-syncopal episode at home several hours later and was taken to [facility] ED.  At time of admission, pt's Hg was 4.9, Hct 16.1.  Daughter at bedside with pt states he has undergone Colonoscopy multiple times in the last several months which were negative for bleed.  UGI also negative for source of bleeding.  Pt is followed by Dr. Baker, Hematology along with Dr. Peterson at [facility] and Dr. Avendano, Nephrology.  Rep[orts no overt bleeding recently.  Denies abdominal pain, distention or fever.   BM's are normal.  No issues recently with hemorrhoids.    Had TAVR on 20 for severe AS.  Currently on Aspirin and Brilinta for  unclear reasons.   No hx of CAD or PCI's.  Remote CEA.    Has had Distant colonoscopy in :  Polyp, Colon AVM, Hemorrhoids with PB.   EGD: :  Minor antral gastritis.  Path negative.    Colonoscopy :  4 large Tubular adenomas were removed by NA in the Asc Colon and recto-sigmoid.    Colonoscopy 20:  JS:  TA completion polypectomy in Asc Colon and Large Internal hemorrhoids.          PAST MEDICAL & SURGICAL HISTORY:  GI bleeding  due to ulcerated polyps and colonic AVMs    Aortic stenosis    ESRD on dialysis  HD on  and     Risk factors for obstructive sleep apnea    Anemia    RICHARDS (dyspnea on exertion)    VT (ventricular tachycardia)    HTN (hypertension)    CAD (coronary artery disease)    Arrhythmia    AV block, 1st degree    DM (diabetes mellitus)    S/P TAVR (transcatheter aortic valve replacement)    H/O carotid endarterectomy  Right    A-V fistula  left arm 2017    H/O angioplasty  ,  no  intervention    H/O left knee surgery    H/O circumcision  at  age  65        ROS:  No Heartburn, regurgitation, dysphagia, odynophagia.  No dyspepsia  No abdominal pain.    No Nausea, vomiting.  No Bleeding.  No hematemesis.   No diarrhea.    No hematochesia.  No weight loss, anorexia.  No edema.      MEDICATIONS  (STANDING):  atorvastatin 80 milliGRAM(s) Oral daily  darbepoetin Injectable ViaL 200 MICROGram(s) SubCutaneous every 7 days  doxazosin 1 milliGRAM(s) Oral at bedtime  DULoxetine 30 milliGRAM(s) Oral daily  hydrALAZINE 50 milliGRAM(s) Oral every 8 hours  influenza   Vaccine 0.5 milliLiter(s) IntraMuscular once  iron sucrose IVPB 100 milliGRAM(s) IV Intermittent every 7 days  isosorbide   mononitrate ER Tablet (IMDUR) 30 milliGRAM(s) Oral <User Schedule>  Nephro-pito 1 Tablet(s) Oral daily  NIFEdipine XL 90 milliGRAM(s) Oral <User Schedule>  NIFEdipine XL 60 milliGRAM(s) Oral <User Schedule>  pantoprazole  Injectable 40 milliGRAM(s) IV Push daily  sodium chloride 0.9% lock flush 3 milliLiter(s) IV Push every 8 hours  torsemide 20 milliGRAM(s) Oral daily    MEDICATIONS  (PRN):  hydrALAZINE Injectable 10 milliGRAM(s) IV Push every 2 hours PRN SBP > 180      Allergies    Plavix (Hives)  Toprol-XL (Rash)    Intolerances    provide vanilla nepro TID- RD ok (Unknown)      SOCIAL HISTORY:    FAMILY HISTORY:  FH: type 2 diabetes    Family history of premature CAD    Family history of lung cancer (Grandparent)    Family history of cancer (Grandparent)        Vital Signs Last 24 Hrs  T(C): 37.2 (15 Sep 2020 16:51), Max: 37.5 (15 Sep 2020 12:00)  T(F): 99 (15 Sep 2020 16:51), Max: 99.5 (15 Sep 2020 12:00)  HR: 78 (15 Sep 2020 16:51) (74 - 92)  BP: 169/70 (15 Sep 2020 17:04) (127/55 - 192/70)  BP(mean): 106 (15 Sep 2020 10:00) (93 - 108)  RR: 20 (15 Sep 2020 16:51) (16 - 26)  SpO2: 98% (15 Sep 2020 16:51) (94% - 100%)    PHYSICAL EXAM:    GENERAL: NAD, well-groomed, well-developed  HEAD:  Atraumatic, Normocephalic  EYES: EOMI, PERRLA, conjunctiva and sclera clear  ENMT: No tonsillar erythema, exudates, or enlargement; Moist mucous membranes, Good dentition, No lesions  NECK: Supple, No JVD, Normal thyroid  CHEST/LUNG: Clear to percussion bilaterally; No rales, rhonchi, wheezing, or rubs;  Mid chest scar.  External cardiac monitor.    HEART: Regular rate and rhythm; No murmurs, rubs, or gallops  ABDOMEN: Soft, Nontender, Nondistended; Bowel sounds present  EXTREMITIES:  2+ Peripheral Pulses, No clubbing, cyanosis, or edema  LYMPH: No lymphadenopathy noted  SKIN: No rashes or lesions      LABS:                        8.0    6.69  )-----------( 118      ( 15 Sep 2020 10:25 )             24.8     09-15    140  |  104  |  32.0<H>  ----------------------------<  106<H>  3.9   |  26.0  |  2.99<H>    Ca    8.9      15 Sep 2020 05:57  Mg     2.0     09-15    TPro  5.9<L>  /  Alb  3.5  /  TBili  0.4  /  DBili  x   /  AST  100<H>  /  ALT  43<H>  /  AlkPhos  84  09-14    PT/INR - ( 15 Sep 2020 02:01 )   PT: 12.7 sec;   INR: 1.10 ratio         PTT - ( 15 Sep 2020 02:01 )  PTT:28.7 sec   Urinalysis Basic - ( 15 Sep 2020 02:01 )    Color: Yellow / Appearance: Clear / S.010 / pH: x  Gluc: x / Ketone: Negative  / Bili: Negative / Urobili: Negative mg/dL   Blood: x / Protein: 100 mg/dL / Nitrite: Negative   Leuk Esterase: Trace / RBC: 0-2 /HPF / WBC 0-2   Sq Epi: x / Non Sq Epi: Occasional / Bacteria: Occasional        LIVER FUNCTIONS - ( 14 Sep 2020 20:20 )  Alb: 3.5 g/dL / Pro: 5.9 g/dL / ALK PHOS: 84 U/L / ALT: 43 U/L / AST: 100 U/L / GGT: x           Hepatitis profiles:  Neg for A, B, C.        RADIOLOGY & ADDITIONAL STUDIES:

## 2020-09-15 NOTE — CONSULT NOTE ADULT - ASSESSMENT
87 yo M with recent TAVR 8/21/2020 with Dr. Lilly, [facility] of ESRD new to HD, Chronic Anemia, HFpEF, HTN, AS, prior GI bleed from ulcerated polyps and colonic AVMs presented to HD yesterday, was only able to tolerate 30 minutes.  Admitted via ED with acute anemia Hgb 4    Acute on chronic anemia - has baseline anemia secondary to CKD, h/o ulcerated polyps and colonic AVMs. He's on aranesp in the outpatient setting as well as aspirin.  Acute anemia can be in setting of GI losses, s/p 2 units of prbc with improved hemoglobin.    Maintain Hgb > 7 g/dl  Check FOBT  Monitor daily CBC   Can consider outpatient bone marrow biopsy with primary hematologist to r/o MDS, however acute anemia in a span in <2 weeks is more consistent with acute blood loss anemia rather than MDS.          85 yo M with recent TAVR 8/21/2020 with Dr. Lilly, [facility] of ESRD new to HD, Chronic Anemia, HFpEF, HTN, AS, prior GI bleed from ulcerated polyps and colonic AVMs presented to HD yesterday, was only able to tolerate 30 minutes.  Admitted via ED with acute anemia Hgb 4    Acute on chronic anemia - has baseline anemia secondary to CKD, h/o ulcerated polyps and colonic AVMs. He's on aranesp in the outpatient setting as well as aspirin/Brilinta  Acute anemia can be in setting of GI losses, s/p 2 units of prbc with improved hemoglobin.    Maintain Hgb > 7 g/dl  Check FOBT  Check CT abd/pelvis w/o contrast to r/o retroperitoneal bleed as he's on ASA and Brilinta  Monitor daily CBC   Can consider bone marrow biopsy  r/o MDS, however acute anemia in a span in <2 weeks is more consistent with acute blood loss anemia rather than MDS. Would r/o RP bleed, GI bleed prior to pursuing bone marrow biopsy         85 yo M with recent TAVR 8/21/2020 with Dr. Lilly, [facility] of ESRD new to HD, Chronic Anemia, HFpEF, HTN, AS, prior GI bleed from ulcerated polyps and colonic AVMs presented to HD yesterday, was only able to tolerate 30 minutes.  Admitted via ED with acute anemia Hgb 4    Acute on chronic anemia - has baseline anemia secondary to CKD, h/o ulcerated polyps and colonic AVMs. He's on aranesp in the outpatient setting as well as aspirin/Brilinta  Acute anemia can be in setting of GI losses, s/p 2 units of prbc with improved hemoglobin.    Maintain Hgb > 7 g/dl  Check FOBT  Check CT abd/pelvis w/o contrast to r/o retroperitoneal bleed as he's on ASA and Brilinta  Monitor daily CBC   Can consider bone marrow biopsy  r/o MDS, however acute anemia in a span in <2 weeks is more consistent with acute blood loss anemia rather than MDS. Would r/o RP bleed, GI bleed prior to pursuing bone marrow biopsy  Plan discussed with patient's daughter and Dr. Avendano

## 2020-09-16 LAB
ANION GAP SERPL CALC-SCNC: 12 MMOL/L — SIGNIFICANT CHANGE UP (ref 5–17)
BUN SERPL-MCNC: 40 MG/DL — HIGH (ref 8–20)
CALCIUM SERPL-MCNC: 8.8 MG/DL — SIGNIFICANT CHANGE UP (ref 8.6–10.2)
CHLORIDE SERPL-SCNC: 105 MMOL/L — SIGNIFICANT CHANGE UP (ref 98–107)
CO2 SERPL-SCNC: 23 MMOL/L — SIGNIFICANT CHANGE UP (ref 22–29)
CREAT SERPL-MCNC: 4.27 MG/DL — HIGH (ref 0.5–1.3)
GLUCOSE SERPL-MCNC: 133 MG/DL — HIGH (ref 70–99)
HCT VFR BLD CALC: 23.1 % — LOW (ref 39–50)
HGB BLD-MCNC: 7.3 G/DL — LOW (ref 13–17)
MAGNESIUM SERPL-MCNC: 2 MG/DL — SIGNIFICANT CHANGE UP (ref 1.6–2.6)
MCHC RBC-ENTMCNC: 30.8 PG — SIGNIFICANT CHANGE UP (ref 27–34)
MCHC RBC-ENTMCNC: 31.6 GM/DL — LOW (ref 32–36)
MCV RBC AUTO: 97.5 FL — SIGNIFICANT CHANGE UP (ref 80–100)
PLATELET # BLD AUTO: 134 K/UL — LOW (ref 150–400)
POTASSIUM SERPL-MCNC: 4 MMOL/L — SIGNIFICANT CHANGE UP (ref 3.5–5.3)
POTASSIUM SERPL-SCNC: 4 MMOL/L — SIGNIFICANT CHANGE UP (ref 3.5–5.3)
RBC # BLD: 2.37 M/UL — LOW (ref 4.2–5.8)
RBC # FLD: 20.2 % — HIGH (ref 10.3–14.5)
SARS-COV-2 IGG SERPL QL IA: NEGATIVE — SIGNIFICANT CHANGE UP
SARS-COV-2 IGM SERPL IA-ACNC: 0.02 INDEX — SIGNIFICANT CHANGE UP
SODIUM SERPL-SCNC: 140 MMOL/L — SIGNIFICANT CHANGE UP (ref 135–145)
WBC # BLD: 6.69 K/UL — SIGNIFICANT CHANGE UP (ref 3.8–10.5)
WBC # FLD AUTO: 6.69 K/UL — SIGNIFICANT CHANGE UP (ref 3.8–10.5)

## 2020-09-16 PROCEDURE — 71045 X-RAY EXAM CHEST 1 VIEW: CPT | Mod: 26

## 2020-09-16 PROCEDURE — 99233 SBSQ HOSP IP/OBS HIGH 50: CPT

## 2020-09-16 PROCEDURE — 78707 K FLOW/FUNCT IMAGE W/O DRUG: CPT | Mod: 26

## 2020-09-16 PROCEDURE — 99232 SBSQ HOSP IP/OBS MODERATE 35: CPT

## 2020-09-16 PROCEDURE — 71250 CT THORAX DX C-: CPT | Mod: 26

## 2020-09-16 PROCEDURE — 74176 CT ABD & PELVIS W/O CONTRAST: CPT | Mod: 26

## 2020-09-16 RX ORDER — FUROSEMIDE 40 MG
40 TABLET ORAL ONCE
Refills: 0 | Status: COMPLETED | OUTPATIENT
Start: 2020-09-16 | End: 2020-09-16

## 2020-09-16 RX ADMIN — Medication 40 MILLIGRAM(S): at 19:32

## 2020-09-16 RX ADMIN — DULOXETINE HYDROCHLORIDE 30 MILLIGRAM(S): 30 CAPSULE, DELAYED RELEASE ORAL at 14:21

## 2020-09-16 NOTE — PROGRESS NOTE ADULT - ASSESSMENT
The patient is a 86y Male with multiple medical issues including neuropathic pain from diabetic peripheral neuropathy.    Neuropathy with pain.   Had side effects from Gabapentin.  Continue Duloxetine 30 mg daily    Anemia.  Work up and management per medicine/renal.    Case discussed with his daughter    will follow with you    Bassam Strong MD PhD   [phone]

## 2020-09-16 NOTE — PROGRESS NOTE ADULT - PROBLEM SELECTOR PLAN 2
Nephrology Dr. Avendano input appreciated.   Will trial off of HD.   Will attempt non-dialysis conservative care and continue to monitor patient. Nephrology Dr. Avendano input appreciated.   Will re evaluate in am

## 2020-09-16 NOTE — PROGRESS NOTE ADULT - ASSESSMENT
85 yo M with recent TAVR 8/21/2020 with Dr. Lilly, [facility] of ESRD new to HD, Chronic Anemia, HFpEF, HTN, AS, prior GI bleed from ulcerated polyps and colonic AVMs presented to HD 9/14 and was only able to tolerate 30 minutes.  Pt then had c/o dizziness and had syncopal episode at home several hours later and was taken to [facility] ED  At time of admission, pt's Hg was 4.9, Hct 16.1.  As per his family, pt has undergone Colonoscopy multiple times in the last several months which were negative for bleed.  UGI also negative for source of bleeding. Upon admission patient received 2 units PRBC with lasix. Brilinta/ASA held. Hematology and GI consults appreciated.

## 2020-09-16 NOTE — PROGRESS NOTE ADULT - ASSESSMENT
87 yo M with recent TAVR 8/21/2020 with Dr. Lilly, [facility] of ESRD new to HD, Chronic Anemia, HFpEF, HTN, AS, prior GI bleed from ulcerated polyps and colonic AVMs presented to HD 9/14 and was only able to tolerate 30 minutes.  Pt then had c/o dizziness and had syncopal episode at home several hours later and was taken to [facility] ED  At time of admission, pt's Hg was 4.9, Hct 16.1.  As per his family, pt has undergone Colonoscopy multiple times in the last several months which were negative for bleed.  UGI also negative for source of bleeding. Upon admission patient received 2 units PRBC with lasix. Brilinta/ASA held. Hematology and GI consults appreciated.   Problem/Plan - 1:  ·  Problem: Acute anemia.   ·  Plan: Baseline anemia secondary to CKD, h/o ulcerated polyps and colonic AVMs.  On aranesp in the outpatient setting, as well as ASA/Brilinta.  S/p 2 units RBCs with Lasix after first unit 9/15.   Monitor CBC q 6 hrs.   Maintain Hgb > 7 g/dl.  Transfuse PRN.   Hematology input appreciated.   Consider bone marrow biopsy to r/o MDS, even though presenting more like acute blood loss anemia.   +FOBT  Recommending CT abd/pelvis w/out contrast to r/o RP bleed given AC with ASA/Brillinta.  As per GI, if heme eval is negative, would suggest Capsule Endoscopy on outpatient basis via GI office.   Problem/Plan - 2:  ·  Problem: ESRD on dialysis.   ·  Plan: Nephrology Dr. Avendano input appreciated.   Will trial off of HD.   Will attempt non-dialysis conservative care and continue to monitor patient.   Problem/Plan - 3:  ·  Problem: HTN (hypertension).   ·  Plan: Continue home medications, Nifedipine, Hydralazine, Isosorbide.   Problem/Plan - 4:  ·  Problem: Aortic stenosis.   ·  Plan: S/p TAVR on 8/21/2020 with Dr. Lilly.   Problem/Plan - 5:  ·  Problem: Peripheral neuropathy.   ·  Plan: Worsening peripheral neuropathy symptoms.    Unable to tolerate gabapentin in the past.    Neurology consult appreciated.  Cymbalta therapy initiated.   Continue to monitor symptoms.   Problem/Plan - 6:  Problem: DM (diabetes mellitus).  Plan: HA1c 4.6.  BS goal .   Continue to monitor.  Problem/Plan - 7:  ·  Problem: Prophylactic measure.   ·  Plan: SCDs for DVT prophylaxis.  Pantoprazole for GI prophylaxis.            S/P TAVR,    DM - 2 w. CKD, DMN, Neuropathy ( painful )    AVF +    Refracory , AMY Resistant anemia,    ? Melodysplasia ( D/W Dr. Ulloa )    [facility]  [facility] " [facility] - [facility] ",    Will attempt non - Dialysis Conservative care, per family wishes,     D/W [facility] [employer] family,

## 2020-09-16 NOTE — PROGRESS NOTE ADULT - SUBJECTIVE AND OBJECTIVE BOX
Vital Signs Last 24 Hrs  T(C): 36.8 (16 Sep 2020 11:00), Max: 37.2 (15 Sep 2020 16:51)  T(F): 98.3 (16 Sep 2020 11:00), Max: 99 (15 Sep 2020 16:51)  HR: 82 (16 Sep 2020 11:00) (76 - 98)  BP: 144/66 (16 Sep 2020 11:00) (136/63 - 192/70)  BP(mean): --  RR: 18 (16 Sep 2020 11:00) (18 - 20)  SpO2: 98% (16 Sep 2020 11:00) (97% - 100%)    140    |  105    |  40.0<H>  ----------------------------<  133<H>  Ca:8.8   (16 Sep 2020 06:32)  4.0     |  23.0   |  4.27<H>      eGFR if Non : 12 <L>  eGFR if : 14 <L>    TPro  5.9<L>  /  Alb  3.5    /  TBili  0.4    /  DBili  x      /  AST  100<H>  /  ALT  43<H>  /  AlkPhos  84     14 Sep 2020 20:20                        7.3<L>  6.69  )-----------( 134<L>    ( 16 Sep 2020 06:32 )             23.1<L>    Phos:-- M.0 mg/dL PTH:-- Uric acid:-- Serum Osm:--  Ferritin:-- Iron:-- TIBC:-- Tsat:--  B12:-- TSH:-- ( @ 06:32)    Urinalysis Basic - ( 15 Sep 2020 02:01 )  Color: Yellow / Appearance: Clear / S.010 / pH: x  Gluc: x / Ketone: Negative  / Bili: Negative / Urobili: Negative mg/dL   Blood: x / Protein: 100 mg/dL<!> / Nitrite: Negative   Leuk Esterase: Trace<!> / RBC: 0-2 /HPF / WBC 0-2   Sq Epi: x / Non Sq Epi: Occasional / Bacteria: Occasional<!>      ICU Vital Signs Last 24 Hrs,    T(C): 37.1 (15 Sep 2020 08:00), Max: 37.2 (15 Sep 2020 00:00)  T(F): 98.7 (15 Sep 2020 08:00), Max: 99 (15 Sep 2020 00:00)  HR: 75 (15 Sep 2020 10:00) (74 - 92)  BP: 160/66 (15 Sep 2020 10:00) (127/55 - 178/75)  BP(mean): 106 (15 Sep 2020 10:00) (93 - 108)  RR: 20 (15 Sep 2020 10:00) (16 - 26)  SpO2: 99% (15 Sep 2020 10:00) (94% - 100%)    140    |  104    |  32.0<H>  ----------------------------<  106<H>  Ca:8.9   (15 Sep 2020 05:57)  3.9     |  26.0   |  2.99<H>    eGFR if Non : 18 <L>    TPro  5.9<L>  /  Alb  3.5    /  TBili  0.4    /  DBili  x      /  AST  100<H>  /  ALT  43<H>  /  AlkPhos  84     14 Sep 2020 20:20                        8.0<L>  6.69  )-----------( 118<L>    ( 15 Sep 2020 10:25 )             24.8<L>    M.0 mg/dL (15 @ 05:57)    Urinalysis Basic - ( 15 Sep 2020 02:01 )  Color: Yellow / Appearance: Clear / S.010 / pH: x  Gluc: x / Ketone: Negative  / Bili: Negative / Urobili: Negative mg/dL   Blood: x / Protein: 100 mg/dL<!> / Nitrite: Negative   Leuk Esterase: Trace<!> / RBC: 0-2 /HPF / WBC 0-2   Sq Epi: x / Non Sq Epi: Occasional / Bacteria: Occasional<!>    Patient is a 86y old  Male who presents with a chief complaint of Hg 4.9. (15 Sep 2020 18:09)    HPI:  87 yo M with recent TAVR 2020 with Dr. Lilly, [facility] of ESRD new to HD, Chronic Anemia, HFpEF, HTN, AS, prior GI bleed from ulcerated polyps and colonic AVMs presented to HD yesterday, was only able to tolerate 30 minutes.  Pt then had c/o dizziness and had syncopal episode at home several hours later and was taken to [facility] ED.  At time of admission, pt's Hg was 4.9, Hct 16.1.  Daughter at bedside with pt states he has undergone Colonoscopy multiple times in the last several months which were negative for bleed.  UGI also negative for source of bleeding.  Pt is followed by Dr. Baker, Hematology along with Dr. Peterson at [facility] and Dr. Avendano, Nephrology. (15 Sep 2020 02:01)    PAST MEDICAL & SURGICAL HISTORY:  GI bleeding due to ulcerated polyps and colonic AVMs  Aortic stenosis  ESRD on dialysis (HD on  and )  Risk factors for obstructive sleep apnea  Anemia  RICHARDS (dyspnea on exertion)  VT (ventricular tachycardia)  HTN (hypertension)  CAD (coronary artery disease)  Arrhythmia  AV block, 1st degree  DM (diabetes mellitus)  S/P TAVR (transcatheter aortic valve replacement)  H/O carotid endarterectomy (Right)  A-V fistula (left arm 2017)  H/O angioplasty (, no intervention)  H/O left knee surgery  H/O circumcision at age 65    FAMILY HISTORY:  FH: type 2 diabetes  Family history of premature CAD  Family history of lung cancer (Grandparent)  Family history of cancer (Grandparent)    Subjective: Patient sitting on the edge of his bed in no acute distress. "I'm cold. Can I have a few more blankets?" Denies any current fevers, chills, lightheadedness, dizziness, HA, CP, palpitations, SOB, cough, abdominal pain, N/V, diarrhea, or any other acute complaints.    MEDICATIONS  (STANDING):  atorvastatin 80 milliGRAM(s) Oral daily  darbepoetin Injectable ViaL 200 MICROGram(s) SubCutaneous every 7 days  doxazosin 1 milliGRAM(s) Oral at bedtime  DULoxetine 30 milliGRAM(s) Oral daily  hydrALAZINE 50 milliGRAM(s) Oral every 8 hours  influenza   Vaccine 0.5 milliLiter(s) IntraMuscular once  iron sucrose IVPB 100 milliGRAM(s) IV Intermittent every 7 days  isosorbide   mononitrate ER Tablet (IMDUR) 30 milliGRAM(s) Oral <User Schedule>  Nephro-pito 1 Tablet(s) Oral daily  NIFEdipine XL 90 milliGRAM(s) Oral <User Schedule>  NIFEdipine XL 60 milliGRAM(s) Oral <User Schedule>  pantoprazole  Injectable 40 milliGRAM(s) IV Push daily  sodium chloride 0.9% lock flush 3 milliLiter(s) IV Push every 8 hours  torsemide 20 milliGRAM(s) Oral daily    MEDICATIONS  (PRN):  hydrALAZINE Injectable 10 milliGRAM(s) IV Push every 2 hours PRN SBP > 180    Allergies:  Plavix (Hives)  Toprol-XL (Rash)    Vitals   T(C): 36.8 (15 Sep 2020 22:26), Max: 37.5 (15 Sep 2020 12:00)  T(F): 98.3 (15 Sep 2020 22:26), Max: 99.5 (15 Sep 2020 12:00)  HR: 90 (15 Sep 2020 22:26) (75 - 92)  BP: 188/67 (15 Sep 2020 22:26) (155/70 - 192/70)  BP(mean): 106 (15 Sep 2020 10:00) (93 - 108)  RR: 18 (15 Sep 2020 22:26) (18 - 26)  SpO2: 99% (15 Sep 2020 22:26) (96% - 99%)    I&O's Detail    14 Sep 2020 07:01  -  15 Sep 2020 07:00  --------------------------------------------------------  IN:    IV PiggyBack: 100 mL    Oral Fluid: 240 mL    PRBCs (Packed Red Blood Cells): 665 mL  Total IN: 1005 mL    OUT:    Voided (mL): 250 mL  Total OUT: 250 mL    Total NET: 755 mL      15 Sep 2020 07:01  -  16 Sep 2020 03:17  --------------------------------------------------------  IN:    Oral Fluid: 240 mL  Total IN: 240 mL    OUT:    Voided (mL): 125 mL  Total OUT: 125 mL    Total NET: 115 mL    Physical Exam  Neuro: A+O x 3, non-focal, speech clear and intact  HEENT:  NCAT, PERRL, EOMI. No conjunctival edema or icterus, no thrush.    Neck:  Supple, trachea midline  Pulm: CTA, good air entry, equal bilaterally, no rales/rhonchi/wheezing, no accessory muscle use noted  CV: regular rate, regular rhythm, +S1S2  Abd: soft, NT, ND, + BS  Ext: MARQUEZ x 4, no edema, no cyanosis or clubbing, distal motor/neuro/circ intact, LUE AV fistula  Skin: warm, dry, well perfused    LABS                        8.4    6.97  )-----------( 79       ( 15 Sep 2020 19:46 )             26.4     09-15    140  |  104  |  32.0<H>  ----------------------------<  106<H>  3.9   |  26.0  |  2.99<H>    Ca    8.9      15 Sep 2020 05:57  Mg     2.0     09-15    TPro  5.9<L>  /  Alb  3.5  /  TBili  0.4  /  DBili  x   /  AST  100<H>  /  ALT  43<H>  /  AlkPhos  84      PT/INR - ( 15 Sep 2020 02:01 )   PT: 12.7 sec;   INR: 1.10 ratio      PTT - ( 15 Sep 2020 02:01 )  PTT:28.7 sec    Urinalysis Basic - ( 15 Sep 2020 02:01 )  Color: Yellow / Appearance: Clear / S.010 / pH: x  Gluc: x / Ketone: Negative  / Bili: Negative / Urobili: Negative mg/dL   Blood: x / Protein: 100 mg/dL / Nitrite: Negative   Leuk Esterase: Trace / RBC: 0-2 /HPF / WBC 0-2   Sq Epi: x / Non Sq Epi: Occasional / Bacteria: Occasional    Last CXR:    Xray Chest 1 View AP/PA. (20 @ 20:40)     FINDINGS:  Single frontal view of the chest demonstrates the lungs to be clear. The cardiomediastinal silhouette is enlarged. No acute osseous abnormalities. Overlying EKG leads and wires are noted. Left-sided loop recorder. Osseous structures are osteopenic. Consider CT as clinically warranted. Status post transcatheter aortic valve replacement.  IMPRESSION: No acute cardiopulmonary disease process. Cardiomegaly.              87 yo M with recent TAVR 2020 with Dr. Lilly, [facility] of ESRD new to HD, Chronic Anemia, HFpEF, HTN, AS, prior GI bleed from ulcerated polyps and colonic AVMs presented to HD  and was only able to tolerate 30 minutes.  Pt then had c/o dizziness and had syncopal episode at home several hours later and was taken to [facility] ED  At time of admission, pt's Hg was 4.9, Hct 16.1.  As per his family, pt has undergone Colonoscopy multiple times in the last several months which were negative for bleed.  UGI also negative for source of bleeding. Upon admission patient received 2 units PRBC with lasix. Brilinta/ASA held. Hematology and GI consults appreciated.   Problem/Plan - 1:  ·  Problem: Acute anemia.   ·  Plan: Baseline anemia secondary to CKD, h/o ulcerated polyps and colonic AVMs.  On aranesp in the outpatient setting, as well as ASA/Brilinta.  S/p 2 units RBCs with Lasix after first unit 9/15.   Monitor CBC q 6 hrs.   Maintain Hgb > 7 g/dl.  Transfuse PRN.     Consider bone marrow biopsy to r/o MDS, even though presenting more like acute blood loss anemia.   +FOBT  Recommending CT abd/pelvis w/out contrast to r/o RP bleed given AC with ASA/Brillinta.  As per GI, if heme eval is negative, would suggest Capsule Endoscopy on outpatient basis via GI office.   Problem/Plan - 2:  ·  Problem: ESRD on dialysis.   ·  Plan:  Will trial off of HD.   Will attempt non-dialysis conservative care and continue to monitor patient.   Problem/Plan - 3:  ·  Problem: HTN (hypertension).   ·  Plan: Continue home medications, Nifedipine, Hydralazine, Isosorbide.   Problem/Plan - 4:  ·  Problem: Aortic stenosis.   ·  Plan: S/p TAVR on 2020 with Dr. Lilly.   Problem/Plan - 5:  ·  Problem: Peripheral neuropathy.   ·  Plan: Worsening peripheral neuropathy symptoms.    Unable to tolerate gabapentin in the past.    Neurology consult appreciated.  Cymbalta therapy initiated.   Continue to monitor symptoms.   Problem/Plan - 6:  Problem: DM (diabetes mellitus).  Plan: HA1c 4.6.  BS goal .   Continue to monitor.  Problem/Plan - 7:  ·  Problem: Prophylactic measure.   ·  Plan: SCDs for DVT prophylaxis.              Pantoprazole for GI prophylaxis.    S/P TAVR,    DM - 2 w. CKD, DMN, Neuropathy ( painful )    AVF +    Refracory , AMY Resistant anemia,    ? Melodysplasia ( D/W Dr. Ulloa )    Dr. Chambers to [facility],  HD " On - Hold ",    Will attempt non - Dialysis Conservative care, per family wishes,     D/W [facility]en [employer] The family,

## 2020-09-16 NOTE — PROGRESS NOTE ADULT - ASSESSMENT
85 yo M with recent TAVR 8/21/2020 with Dr. Lilly, [facility] [place] of ESRD new to HD, Chronic Anemia, HFpEF, HTN, AS, prior GI bleed from ulcerated polyps and colonic AVMs presented to HD 9/14 and was only able to tolerate 30 minutes.  Pt then had c/o dizziness and had syncopal episode at home several hours later and was taken to [facility] ED  At time of admission, pt's Hg was 4.9, Hct 16.1.  As per his family, pt has undergone Colonoscopy multiple times in the last several months which were negative for bleed.  UGI also negative for source of bleeding. Upon admission patient received 2 units PRBC with lasix. Brilinta/ASA held. Hematology and GI consults appreciated.   9/16 Abd CT R/O  RP  bleed> negative   Bleeding scan 87 yo M with recent TAVR 8/21/2020 with Dr. Lilly, [facility] of ESRD new to HD, Chronic Anemia, HFpEF, HTN, AS, prior GI bleed from ulcerated polyps and colonic AVMs presented to HD 9/14 and was only able to tolerate 30 minutes.  Pt then had c/o dizziness and had syncopal episode at home several hours later and was taken to [facility] ED  At time of admission, pt's Hg was 4.9, Hct 16.1.  As per his family, pt has undergone Colonoscopy multiple times in the last several months which were negative for bleed.  UGI also negative for source of bleeding. Upon admission patient received 2 units PRBC with lasix. Brilinta/ASA held. Hematology and GI consults appreciated.   9/16 Abd CT R/O  RP  bleed> negative    Heme recommendes  BMB R/O myelodysplasia as acute anemia more consistent with blood loss anemia(CT neg RP bleed) 85 yo M with recent TAVR 8/21/2020 with Dr. Lilly, [facility] of ESRD new to HD, Chronic Anemia, HFpEF, HTN, AS, prior GI bleed from ulcerated polyps and colonic AVMs presented to HD 9/14 and was only able to tolerate 30 minutes.  Pt then had c/o dizziness and had syncopal episode at home several hours later and was taken to [facility] ED  At time of admission, pt's Hg was 4.9, Hct 16.1.  As per his family, pt has undergone Colonoscopy multiple times in the last several months which were negative for bleed.  UGI also negative for source of bleeding. Upon admission patient received 2 units PRBC with lasix. Brilinta/ASA held. Hematology and GI consults appreciated.   9/16 Abd CT R/O  RP  bleed> negative    Heme recommends  BMB R/O myelodysplasia as acute anemia more consistent with blood loss anemia(CT neg RP bleed)  Discussed w/ Dr Edwards>capsule study am 85 yo M with recent TAVR 8/21/2020 with Dr. Lilly, [facility] of ESRD new to HD, Chronic Anemia, HFpEF, HTN, AS, prior GI bleed from ulcerated polyps and colonic AVMs presented to HD 9/14 and was only able to tolerate 30 minutes.  Pt then had c/o dizziness and had syncopal episode at home several hours later and was taken to [facility] ED  At time of admission, pt's Hg was 4.9, Hct 16.1.  As per his family, pt has undergone Colonoscopy multiple times in the last several months which were negative for bleed.  UGI also negative for source of bleeding. Upon admission patient received 2 units PRBC with lasix. Brilinta/ASA held. Hematology and GI consults appreciated.   9/16 Abd CT R/O  RP  bleed> negative    Heme recommends  BMB after capsule study completed to R/O myelodysplasia as acute anemia more consistent with blood loss anemia(CT neg RP bleed)  Discussed w/ Dr Edwards>capsule study will be done outpt only

## 2020-09-16 NOTE — PROGRESS NOTE ADULT - PROBLEM SELECTOR PLAN 2
Nephrology Dr. Avendano input appreciated.   Will trial off of HD.   Will attempt non-dialysis conservative care and continue to monitor patient.

## 2020-09-16 NOTE — PROGRESS NOTE ADULT - ASSESSMENT
85 yo M with recent TAVR 8/21/2020 with Dr. Lilly, [facility] of ESRD new to HD, Chronic Anemia, HFpEF, HTN, AS, prior GI bleed from ulcerated polyps and colonic AVMs presented to HD yesterday, was only able to tolerate 30 minutes.  Admitted via ED with acute anemia Hgb 4    Acute on chronic anemia - has baseline anemia secondary to CKD, h/o ulcerated polyps and colonic AVMs. He's on aranesp in the outpatient setting as well as aspirin/Brilinta  Acute anemia can be in setting of GI losses, s/p 2 units of prbc with improved hemoglobin.  FOBT was positive.    Plan:  Maintain Hgb > 7 g/dl  Check CT abd/pelvis w/o contrast to r/o retroperitoneal bleed as he's on ASA and Brilinta  Monitor daily CBC, please also check LDH  Can consider bone marrow biopsy  r/o MDS, however acute anemia in a span in <2 weeks is more consistent with acute blood loss anemia rather than MDS. Would r/o RP bleed, GI bleed prior to pursuing bone marrow biopsy         87 yo M with recent TAVR 8/21/2020 with Dr. Lilly, [facility] of ESRD new to HD, Chronic Anemia, HFpEF, HTN, AS, prior GI bleed from ulcerated polyps and colonic AVMs presented to HD yesterday, was only able to tolerate 30 minutes.  Admitted via ED with acute anemia Hgb 4    Acute on chronic anemia - has baseline anemia secondary to CKD, h/o ulcerated polyps and colonic AVMs. He's on aranesp in the outpatient setting as well as aspirin/Brilinta  Acute anemia can be in setting of GI losses, s/p 2 units of prbc with improved hemoglobin.  FOBT was positive.  CT abd/pelvis negative for retroperitoneal bleeding    Plan:  Maintain Hgb > 7 g/dl  Monitor daily CBC, please also check LDH  Capsule endoscopy as outpatient.   Proceed with bone marrow biopsy on 9/17  Follow up with Dr. Baker in 1 week to review results in office

## 2020-09-16 NOTE — PROGRESS NOTE ADULT - PROBLEM SELECTOR PLAN 1
Baseline anemia secondary to CKD, h/o ulcerated polyps and colonic AVMs.  On aranesp in the outpatient setting, as well as ASA/Brilinta.  S/p 2 units RBCs with Lasix after first unit 9/15.   Monitor CBC q 6 hrs.   Maintain Hgb > 7 g/dl.  Transfuse PRN.   Hematology input appreciated.   Consider bone marrow biopsy to r/o MDS, even though presenting more like acute blood loss anemia.   +FOBT  Recommending CT abd/pelvis w/out contrast to r/o RP bleed given AC with ASA/Brillinta.  As per GI, if heme eval is negative, would suggest Capsule Endoscopy on outpatient basis via GI office. Baseline anemia secondary to CKD, h/o ulcerated polyps and colonic AVMs.  On aranesp in the outpatient setting, as well as ASA/Brilinta.  S/p 2 units RBCs with Lasix after first unit 9/15.   Monitor CBC q 6 hrs.   Maintain Hgb > 7 g/dl.  Transfuse PRN.   Hematology input appreciated.   Consider bone marrow biopsy to r/o MDS, even though presenting more like acute blood loss anemia.   +FOBT  CT abd/pelvis w/out contrast neg  r/o RP bleed   given AC with ASA/Brillinta.  As per GI suggest Capsule Endoscopy on outpatient basis via GI office.

## 2020-09-16 NOTE — PROGRESS NOTE ADULT - SUBJECTIVE AND OBJECTIVE BOX
Patient is a 86y old  Male who presents with a chief complaint of Hg 4.9. (15 Sep 2020 18:09)    HPI:  87 yo M with recent TAVR 2020 with Dr. Lilly, [facility] of ESRD new to HD, Chronic Anemia, HFpEF, HTN, AS, prior GI bleed from ulcerated polyps and colonic AVMs presented to HD yesterday, was only able to tolerate 30 minutes.  Pt then had c/o dizziness and had syncopal episode at home several hours later and was taken to [facility] ED.  At time of admission, pt's Hg was 4.9, Hct 16.1.  Daughter at bedside with pt states he has undergone Colonoscopy multiple times in the last several months which were negative for bleed.  UGI also negative for source of bleeding.  Pt is followed by Dr. Baker, Hematology along with Dr. Peterson at [facility] and Dr. Avendano, Nephrology. (15 Sep 2020 02:01)    PAST MEDICAL & SURGICAL HISTORY:  GI bleeding due to ulcerated polyps and colonic AVMs  Aortic stenosis  ESRD on dialysis (HD on  and )  Risk factors for obstructive sleep apnea  Anemia  RICHARDS (dyspnea on exertion)  VT (ventricular tachycardia)  HTN (hypertension)  CAD (coronary artery disease)  Arrhythmia  AV block, 1st degree  DM (diabetes mellitus)  S/P TAVR (transcatheter aortic valve replacement)  H/O carotid endarterectomy (Right)  A-V fistula (left arm 2017)  H/O angioplasty (, no intervention)  H/O left knee surgery  H/O circumcision at age 65    FAMILY HISTORY:  FH: type 2 diabetes  Family history of premature CAD  Family history of lung cancer (Grandparent)  Family history of cancer (Grandparent)    Subjective: Patient sitting on the edge of his bed in no acute distress. "I'm cold. Can I have a few more blankets?" Denies any current fevers, chills, lightheadedness, dizziness, HA, CP, palpitations, SOB, cough, abdominal pain, N/V, diarrhea, or any other acute complaints.    MEDICATIONS  (STANDING):  atorvastatin 80 milliGRAM(s) Oral daily  darbepoetin Injectable ViaL 200 MICROGram(s) SubCutaneous every 7 days  doxazosin 1 milliGRAM(s) Oral at bedtime  DULoxetine 30 milliGRAM(s) Oral daily  hydrALAZINE 50 milliGRAM(s) Oral every 8 hours  influenza   Vaccine 0.5 milliLiter(s) IntraMuscular once  iron sucrose IVPB 100 milliGRAM(s) IV Intermittent every 7 days  isosorbide   mononitrate ER Tablet (IMDUR) 30 milliGRAM(s) Oral <User Schedule>  Nephro-pito 1 Tablet(s) Oral daily  NIFEdipine XL 90 milliGRAM(s) Oral <User Schedule>  NIFEdipine XL 60 milliGRAM(s) Oral <User Schedule>  pantoprazole  Injectable 40 milliGRAM(s) IV Push daily  sodium chloride 0.9% lock flush 3 milliLiter(s) IV Push every 8 hours  torsemide 20 milliGRAM(s) Oral daily    MEDICATIONS  (PRN):  hydrALAZINE Injectable 10 milliGRAM(s) IV Push every 2 hours PRN SBP > 180    Allergies:  Plavix (Hives)  Toprol-XL (Rash)    Vitals   T(C): 36.8 (15 Sep 2020 22:26), Max: 37.5 (15 Sep 2020 12:00)  T(F): 98.3 (15 Sep 2020 22:26), Max: 99.5 (15 Sep 2020 12:00)  HR: 90 (15 Sep 2020 22:26) (75 - 92)  BP: 188/67 (15 Sep 2020 22:26) (155/70 - 192/70)  BP(mean): 106 (15 Sep 2020 10:00) (93 - 108)  RR: 18 (15 Sep 2020 22:26) (18 - 26)  SpO2: 99% (15 Sep 2020 22:26) (96% - 99%)    I&O's Detail    14 Sep 2020 07:01  -  15 Sep 2020 07:00  --------------------------------------------------------  IN:    IV PiggyBack: 100 mL    Oral Fluid: 240 mL    PRBCs (Packed Red Blood Cells): 665 mL  Total IN: 1005 mL    OUT:    Voided (mL): 250 mL  Total OUT: 250 mL    Total NET: 755 mL      15 Sep 2020 07:01  -  16 Sep 2020 03:17  --------------------------------------------------------  IN:    Oral Fluid: 240 mL  Total IN: 240 mL    OUT:    Voided (mL): 125 mL  Total OUT: 125 mL    Total NET: 115 mL    Physical Exam  Neuro: A+O x 3, non-focal, speech clear and intact  HEENT:  NCAT, PERRL, EOMI. No conjunctival edema or icterus, no thrush.    Neck:  Supple, trachea midline  Pulm: CTA, good air entry, equal bilaterally, no rales/rhonchi/wheezing, no accessory muscle use noted  CV: regular rate, regular rhythm, +S1S2  Abd: soft, NT, ND, + BS  Ext: MARQUEZ x 4, no edema, no cyanosis or clubbing, distal motor/neuro/circ intact, LUE AV fistula  Skin: warm, dry, well perfused    LABS                        8.4    6.97  )-----------( 79       ( 15 Sep 2020 19:46 )             26.4     09-15    140  |  104  |  32.0<H>  ----------------------------<  106<H>  3.9   |  26.0  |  2.99<H>    Ca    8.9      15 Sep 2020 05:57  Mg     2.0     -15    TPro  5.9<L>  /  Alb  3.5  /  TBili  0.4  /  DBili  x   /  AST  100<H>  /  ALT  43<H>  /  AlkPhos  84  09-14    PT/INR - ( 15 Sep 2020 02:01 )   PT: 12.7 sec;   INR: 1.10 ratio      PTT - ( 15 Sep 2020 02:01 )  PTT:28.7 sec    Urinalysis Basic - ( 15 Sep 2020 02:01 )  Color: Yellow / Appearance: Clear / S.010 / pH: x  Gluc: x / Ketone: Negative  / Bili: Negative / Urobili: Negative mg/dL   Blood: x / Protein: 100 mg/dL / Nitrite: Negative   Leuk Esterase: Trace / RBC: 0-2 /HPF / WBC 0-2   Sq Epi: x / Non Sq Epi: Occasional / Bacteria: Occasional    Last CXR:  < from: Xray Chest 1 View AP/PA. (20 @ 20:40) >  FINDINGS:  Single frontal view of the chest demonstrates the lungs to be clear. The cardiomediastinal silhouette is enlarged. No acute osseous abnormalities. Overlying EKG leads and wires are noted. Left-sided loop recorder. Osseous structures are osteopenic. Consider CT as clinically warranted. Status post transcatheter aortic valve replacement.  IMPRESSION: No acute cardiopulmonary disease process. Cardiomegaly.  < end of copied text >

## 2020-09-16 NOTE — PROGRESS NOTE ADULT - SUBJECTIVE AND OBJECTIVE BOX
[facility]                                        Neurology at [place]                                  [facility] [address], [address]                                      [address]                                           [place], [address]                                                [phone]        CC: diabetic peripheral neuropathy     HISTORY:  The patient is a 86y Male with multiple medical issues including Diabetes Mellitus with complications of renal failure now on hemodialysis and neuropathy.   He was admitted secondary to severe anemia. He is on CICU service as he is within 30 days of TAVR.   He reports a history of numbness and burning sensations in his feet.   This has been going on for a few years but has been worse for several months.   He had been started on Gabapentin 100 mg but had severe side effects including delirium after one or two doses. He had fallen on coming home from hemodialysis and was brought to the ER where the severe anemia was found.   Neurology is called for any other recommendations.  (JW)    Interval history: still with foot pain, cymbalta started yesterday    ROS neurology: Denies headache or dizziness. Denies weakness.  (+) b/l foot numbness.  Denies speech/language deficits. Denies diplopia/blurred vision.  Denies confusion    MEDICATIONS  (STANDING):  atorvastatin 80 milliGRAM(s) Oral daily  darbepoetin Injectable ViaL 200 MICROGram(s) SubCutaneous every 7 days  doxazosin 1 milliGRAM(s) Oral at bedtime  DULoxetine 30 milliGRAM(s) Oral daily  furosemide   Injectable 40 milliGRAM(s) IV Push once  hydrALAZINE 50 milliGRAM(s) Oral every 8 hours  influenza   Vaccine 0.5 milliLiter(s) IntraMuscular once  iron sucrose IVPB 100 milliGRAM(s) IV Intermittent every 7 days  isosorbide   mononitrate ER Tablet (IMDUR) 30 milliGRAM(s) Oral <User Schedule>  Nephro-pito 1 Tablet(s) Oral daily  NIFEdipine XL 90 milliGRAM(s) Oral <User Schedule>  NIFEdipine XL 60 milliGRAM(s) Oral <User Schedule>  pantoprazole  Injectable 40 milliGRAM(s) IV Push daily  sodium chloride 0.9% lock flush 3 milliLiter(s) IV Push every 8 hours  torsemide 20 milliGRAM(s) Oral daily    MEDICATIONS  (PRN):  hydrALAZINE Injectable 10 milliGRAM(s) IV Push every 2 hours PRN SBP > 180      Vital Signs Last 24 Hrs  T(C): 36.8 (16 Sep 2020 11:00), Max: 37 (16 Sep 2020 05:21)  T(F): 98.3 (16 Sep 2020 11:00), Max: 98.6 (16 Sep 2020 05:21)  HR: 82 (16 Sep 2020 11:00) (82 - 98)  BP: 144/66 (16 Sep 2020 11:00) (136/63 - 188/67)  BP(mean): --  RR: 18 (16 Sep 2020 11:00) (18 - 18)  SpO2: 98% (16 Sep 2020 11:00) (98% - 100%)     Mental status: The patient is awake, alert, and fully oriented. There is no aphasia. Attention span is normal. Patient is aware of current events.     Cranial nerves: There is no papilledema (direct ophthalmoscope). Pupils react symmetrically to light. There is no visual field deficit to confrontation. Extraocular motion is full with no nystagmus. There is no ptosis. Facial sensation is intact. Facial musculature is symmetric. Palate elevates symmetrically. Tongue is midline.    Motor: There is normal bulk and tone.  Strength is 5/5 in the right arm and leg.   Strength is 5/5 in the left arm and leg.    Sensation: Decreased FT and pinin toes.     Reflexes: Trace throughout except ankles absent and plantar responses are flexor.    Cerebellar: There is no dysmetria on finger to nose testing.    LABS:                                    7.3    6.69  )-----------( 134      ( 16 Sep 2020 06:32 )             23.1     09-16    140  |  105  |  40.0<H>  ----------------------------<  133<H>  4.0   |  23.0  |  4.27<H>    Ca    8.8      16 Sep 2020 06:32  Mg     2.0     09-16    TPro  5.9<L>  /  Alb  3.5  /  TBili  0.4  /  DBili  x   /  AST  100<H>  /  ALT  43<H>  /  AlkPhos  84  09-14    LIVER FUNCTIONS - ( 14 Sep 2020 20:20 )  Alb: 3.5 g/dL / Pro: 5.9 g/dL / ALK PHOS: 84 U/L / ALT: 43 U/L / AST: 100 U/L / GGT: x           PT/INR - ( 15 Sep 2020 02:01 )   PT: 12.7 sec;   INR: 1.10 ratio         PTT - ( 15 Sep 2020 02:01 )  PTT:28.7 sec    RADIOLOGY   CT head no acute CVA, mass or blood

## 2020-09-16 NOTE — PROGRESS NOTE ADULT - SUBJECTIVE AND OBJECTIVE BOX
REASON FOR CONSULTATION:       INTERVAL HISTORY:  Hgb dropped to 7.3 g/dL from 8.4   FOBT positive  Awaiting CT a/p.      REVIEW OF SYSTEMS:  Constitutional, Eyes, ENT, Cardiovascular, Respiratory, Gastrointestinal, Genitourinary, Musculoskeletal, Integumentary, Neurological, Psychiatric, Endocrine, Heme/Lymph, and Allergic/Immunologic review of systems are otherwise negative except as noted in the HPI.        Allergies    Plavix (Hives)  Toprol-XL (Rash)    Intolerances    provide vanilla nepro TID- RD ok (Unknown)    MEDICATIONS  (STANDING):  atorvastatin 80 milliGRAM(s) Oral daily  darbepoetin Injectable ViaL 200 MICROGram(s) SubCutaneous every 7 days  doxazosin 1 milliGRAM(s) Oral at bedtime  DULoxetine 30 milliGRAM(s) Oral daily  hydrALAZINE 50 milliGRAM(s) Oral every 8 hours  influenza   Vaccine 0.5 milliLiter(s) IntraMuscular once  iron sucrose IVPB 100 milliGRAM(s) IV Intermittent every 7 days  isosorbide   mononitrate ER Tablet (IMDUR) 30 milliGRAM(s) Oral <User Schedule>  Nephro-pito 1 Tablet(s) Oral daily  NIFEdipine XL 90 milliGRAM(s) Oral <User Schedule>  NIFEdipine XL 60 milliGRAM(s) Oral <User Schedule>  pantoprazole  Injectable 40 milliGRAM(s) IV Push daily  sodium chloride 0.9% lock flush 3 milliLiter(s) IV Push every 8 hours  torsemide 20 milliGRAM(s) Oral daily    MEDICATIONS  (PRN):  hydrALAZINE Injectable 10 milliGRAM(s) IV Push every 2 hours PRN SBP > 180      PHYSICAL EXAM:    GENERAL: NAD, well-groomed,  HEAD:  Atraumatic, Normocephalic  EYES: EOMI, PERRLA,   NECK: Supple, No JVD, Normal thyroid  NERVOUS SYSTEM:  Alert & Oriented X3, Good concentration;   CHEST/LUNG: Clear to auscultation bilaterally;  HEART: Regular rate and rhythm;  ABDOMEN: Soft, Nontender,   EXTREMITIES:no edema  LYMPH: No lymphadenopathy noted  SKIN: No rashes or lesions      LABS:                                   7.3    6.69  )-----------( 134      ( 16 Sep 2020 06:32 )             23.1     09-16    140  |  105  |  40.0<H>  ----------------------------<  133<H>  4.0   |  23.0  |  4.27<H>    Ca    8.8      16 Sep 2020 06:32  Mg     2.0     09-16    TPro  5.9<L>  /  Alb  3.5  /  TBili  0.4  /  DBili  x   /  AST  100<H>  /  ALT  43<H>  /  AlkPhos  84  09-14             REASON FOR CONSULTATION:       INTERVAL HISTORY:  Hgb dropped to 7.3 g/dL from 8.4   FOBT positive  Feels tired    REVIEW OF SYSTEMS:  Constitutional, Eyes, ENT, Cardiovascular, Respiratory, Gastrointestinal, Genitourinary, Musculoskeletal, Integumentary, Neurological, Psychiatric, Endocrine, Heme/Lymph, and Allergic/Immunologic review of systems are otherwise negative except as noted in the HPI.        Allergies    Plavix (Hives)  Toprol-XL (Rash)    Intolerances    provide vanilla nepro TID- RD ok (Unknown)    MEDICATIONS  (STANDING):  atorvastatin 80 milliGRAM(s) Oral daily  darbepoetin Injectable ViaL 200 MICROGram(s) SubCutaneous every 7 days  doxazosin 1 milliGRAM(s) Oral at bedtime  DULoxetine 30 milliGRAM(s) Oral daily  hydrALAZINE 50 milliGRAM(s) Oral every 8 hours  influenza   Vaccine 0.5 milliLiter(s) IntraMuscular once  iron sucrose IVPB 100 milliGRAM(s) IV Intermittent every 7 days  isosorbide   mononitrate ER Tablet (IMDUR) 30 milliGRAM(s) Oral <User Schedule>  Nephro-pito 1 Tablet(s) Oral daily  NIFEdipine XL 90 milliGRAM(s) Oral <User Schedule>  NIFEdipine XL 60 milliGRAM(s) Oral <User Schedule>  pantoprazole  Injectable 40 milliGRAM(s) IV Push daily  sodium chloride 0.9% lock flush 3 milliLiter(s) IV Push every 8 hours  torsemide 20 milliGRAM(s) Oral daily    MEDICATIONS  (PRN):  hydrALAZINE Injectable 10 milliGRAM(s) IV Push every 2 hours PRN SBP > 180      PHYSICAL EXAM:    GENERAL: NAD, well-groomed,  HEAD:  Atraumatic, Normocephalic  EYES: EOMI, PERRLA,   NECK: Supple, No JVD, Normal thyroid  NERVOUS SYSTEM:  Alert & Oriented X3, Good concentration;   CHEST/LUNG: Clear to auscultation bilaterally;  HEART: Regular rate and rhythm;  ABDOMEN: Soft, Nontender,   EXTREMITIES:no edema  LYMPH: No lymphadenopathy noted  SKIN: No rashes or lesions      LABS:                                   7.3    6.69  )-----------( 134      ( 16 Sep 2020 06:32 )             23.1     09-16    140  |  105  |  40.0<H>  ----------------------------<  133<H>  4.0   |  23.0  |  4.27<H>    Ca    8.8      16 Sep 2020 06:32  Mg     2.0     09-16    TPro  5.9<L>  /  Alb  3.5  /  TBili  0.4  /  DBili  x   /  AST  100<H>  /  ALT  43<H>  /  AlkPhos  84  09-14

## 2020-09-16 NOTE — PROGRESS NOTE ADULT - SUBJECTIVE AND OBJECTIVE BOX
Pt off floor at bleeding scan  Discussed at length with renal  Agree significant drop in H/H without overt bleeding or flank pain not likely to be GIB or RP hematoma, but can easily exclude  Bleeding scan in progress now  CT abdomen w/o contrast pending  Worsening renal function, agree with resuming HD  If bleeding scan and CT negative, agree with B biopsy to exclude myelodysplasia  Transfuse to Hgb > 8

## 2020-09-16 NOTE — PROGRESS NOTE ADULT - PROBLEM SELECTOR PLAN 1
Baseline anemia secondary to CKD, h/o ulcerated polyps and colonic AVMs.  On aranesp in the outpatient setting, as well as ASA/Brilinta.  S/p 2 units RBCs with Lasix after first unit 9/15.   Monitor CBC q 6 hrs.   Maintain Hgb > 7 g/dl.  Transfuse PRN.   Hematology input appreciated.   Consider bone marrow biopsy to r/o MDS, even though presenting more like acute blood loss anemia.   +FOBT  Recommending CT abd/pelvis w/out contrast to r/o RP bleed given AC with ASA/Brillinta.  As per GI, if heme eval is negative, would suggest Capsule Endoscopy on outpatient basis via GI office.

## 2020-09-16 NOTE — PROGRESS NOTE ADULT - SUBJECTIVE AND OBJECTIVE BOX
Subjective:  I dont know whats going on , you tell me"  OOB chair    Tele:   SR  90s                             T(C): 36.8 (09-16-20 @ 11:00), Max: 37.2 (09-15-20 @ 16:51)  HR: 82 (09-16-20 @ 11:00) (76 - 98)  BP: 144/66 (09-16-20 @ 11:00) (136/63 - 192/70)  RR: 18 (09-16-20 @ 11:00) (18 - 20)  SpO2: 98% (09-16-20 @ 11:00) (97% - 100%)    LVEF:     CT < from: CT Chest No Cont (09.16.20 @ 10:41) >  ABDOMEN AND PELVIS:  LIVER: Within normal limits.  BILE DUCTS: Normal caliber.  GALLBLADDER: Gallstones without gross inflammation  SPLEEN: Within normal limits.  PANCREAS: Within normal limits.  ADRENALS: Within normal limits.  KIDNEYS/URETERS: Bilateral renal atrophy. Renal cysts. 5 mm higher attenuation lesion in the lobe pole the left kidney which may represent a small hemorrhagic or proteinaceous cyst.    BLADDER: Within normal limits.  REPRODUCTIVE ORGANS: Within normal limits.    BOWEL: No bowel obstruction. Appendix unremarkable. Few colonic diverticuli without gross inflammation.  PERITONEUM: Minimal pelvic ascites.  VESSELS: Vascular calcifications.  RETROPERITONEUM/LYMPH NODES: No lymphadenopathy.  ABDOMINAL WALL: Within normal limits.  BONES: Degenerative changes of bone. Hemangioma at L1.    IMPRESSION:  No retroperitoneal bleed.    09-16    140  |  105  |  40.0<H>  ----------------------------<  133<H>  4.0   |  23.0  |  4.27<H>    Ca    8.8      16 Sep 2020 06:32  Mg     2.0     09-16    TPro  5.9<L>  /  Alb  3.5  /  TBili  0.4  /  DBili  x   /  AST  100<H>  /  ALT  43<H>  /  AlkPhos  84  09-14                               7.3    6.69  )-----------( 134      ( 16 Sep 2020 06:32 )             23.1        PT/INR - ( 15 Sep 2020 02:01 )   PT: 12.7 sec;   INR: 1.10 ratio         PTT - ( 15 Sep 2020 02:01 )  PTT:28.7 sec    CAPILLARY BLOOD GLUCOSE               CXR:      Assessment    Neuro:     Pulm:     CV:    Abd:     Extremities:       MEDICATIONS  (STANDING):  atorvastatin 80 milliGRAM(s) Oral daily  darbepoetin Injectable ViaL 200 MICROGram(s) SubCutaneous every 7 days  doxazosin 1 milliGRAM(s) Oral at bedtime  DULoxetine 30 milliGRAM(s) Oral daily  furosemide   Injectable 40 milliGRAM(s) IV Push once  hydrALAZINE 50 milliGRAM(s) Oral every 8 hours  influenza   Vaccine 0.5 milliLiter(s) IntraMuscular once  iron sucrose IVPB 100 milliGRAM(s) IV Intermittent every 7 days  isosorbide   mononitrate ER Tablet (IMDUR) 30 milliGRAM(s) Oral <User Schedule>  Nephro-pito 1 Tablet(s) Oral daily  NIFEdipine XL 90 milliGRAM(s) Oral <User Schedule>  NIFEdipine XL 60 milliGRAM(s) Oral <User Schedule>  pantoprazole  Injectable 40 milliGRAM(s) IV Push daily  sodium chloride 0.9% lock flush 3 milliLiter(s) IV Push every 8 hours  torsemide 20 milliGRAM(s) Oral daily       PAST MEDICAL & SURGICAL HISTORY:  GI bleeding  due to ulcerated polyps and colonic AVMs    Aortic stenosis    ESRD on dialysis  HD on Mondays and Fridays    Risk factors for obstructive sleep apnea    Anemia    RICHARDS (dyspnea on exertion)    VT (ventricular tachycardia)    HTN (hypertension)    CAD (coronary artery disease)    Arrhythmia    AV block, 1st degree    DM (diabetes mellitus)    S/P TAVR (transcatheter aortic valve replacement)    H/O carotid endarterectomy  Right    A-V fistula  left arm 5/2017    H/O angioplasty  2013,  no  intervention    H/O left knee surgery    H/O circumcision  at  age  65             Subjective:  I dont know whats going on , you tell me"  OOB chair    Tele:   SR  90s                             T(C): 36.8 (09-16-20 @ 11:00), Max: 37.2 (09-15-20 @ 16:51)  HR: 82 (09-16-20 @ 11:00) (76 - 98)  BP: 144/66 (09-16-20 @ 11:00) (136/63 - 192/70)  RR: 18 (09-16-20 @ 11:00) (18 - 20)  SpO2: 98% (09-16-20 @ 11:00) (97% - 100%)    LVEF:     CT < from: CT Chest No Cont (09.16.20 @ 10:41) >  ABDOMEN AND PELVIS:  LIVER: Within normal limits.  BILE DUCTS: Normal caliber.  GALLBLADDER: Gallstones without gross inflammation  SPLEEN: Within normal limits.  PANCREAS: Within normal limits.  ADRENALS: Within normal limits.  KIDNEYS/URETERS: Bilateral renal atrophy. Renal cysts. 5 mm higher attenuation lesion in the lobe pole the left kidney which may represent a small hemorrhagic or proteinaceous cyst.    BLADDER: Within normal limits.  REPRODUCTIVE ORGANS: Within normal limits.    BOWEL: No bowel obstruction. Appendix unremarkable. Few colonic diverticuli without gross inflammation.  PERITONEUM: Minimal pelvic ascites.  VESSELS: Vascular calcifications.  RETROPERITONEUM/LYMPH NODES: No lymphadenopathy.  ABDOMINAL WALL: Within normal limits.  BONES: Degenerative changes of bone. Hemangioma at L1.    IMPRESSION:  No retroperitoneal bleed.    09-16    140  |  105  |  40.0<H>  ----------------------------<  133<H>  4.0   |  23.0  |  4.27<H>    Ca    8.8      16 Sep 2020 06:32  Mg     2.0     09-16    TPro  5.9<L>  /  Alb  3.5  /  TBili  0.4  /  DBili  x   /  AST  100<H>  /  ALT  43<H>  /  AlkPhos  84  09-14                               7.3    6.69  )-----------( 134      ( 16 Sep 2020 06:32 )             23.1        PT/INR - ( 15 Sep 2020 02:01 )   PT: 12.7 sec;   INR: 1.10 ratio         PTT - ( 15 Sep 2020 02:01 )  PTT:28.7 sec      Assessment    Neuro: A+O x 3, non-focal, speech clear and intact  HEENT:  NCAT, PERRL, EOMI. No conjunctival edema or icterus, no thrush.    Neck:  Supple, trachea midline  Pulm: CTA, good air entry, equal bilaterally, no rales/rhonchi/wheezing, no accessory muscle use noted  CV: regular rate, regular rhythm, +S1S2  Abd: soft, NT, ND, + BS  Ext: MARQUEZ x 4, no edema, no cyanosis or clubbing, distal motor/neuro/circ intact, LUE AV fistula      MEDICATIONS  (STANDING):  atorvastatin 80 milliGRAM(s) Oral daily  darbepoetin Injectable ViaL 200 MICROGram(s) SubCutaneous every 7 days  doxazosin 1 milliGRAM(s) Oral at bedtime  DULoxetine 30 milliGRAM(s) Oral daily  furosemide   Injectable 40 milliGRAM(s) IV Push once  hydrALAZINE 50 milliGRAM(s) Oral every 8 hours  influenza   Vaccine 0.5 milliLiter(s) IntraMuscular once  iron sucrose IVPB 100 milliGRAM(s) IV Intermittent every 7 days  isosorbide   mononitrate ER Tablet (IMDUR) 30 milliGRAM(s) Oral <User Schedule>  Nephro-pito 1 Tablet(s) Oral daily  NIFEdipine XL 90 milliGRAM(s) Oral <User Schedule>  NIFEdipine XL 60 milliGRAM(s) Oral <User Schedule>  pantoprazole  Injectable 40 milliGRAM(s) IV Push daily  sodium chloride 0.9% lock flush 3 milliLiter(s) IV Push every 8 hours  torsemide 20 milliGRAM(s) Oral daily       PAST MEDICAL & SURGICAL HISTORY:  GI bleeding  due to ulcerated polyps and colonic AVMs    Aortic stenosis    ESRD on dialysis  HD on Mondays and Fridays    Risk factors for obstructive sleep apnea    Anemia    RICHARDS (dyspnea on exertion)    VT (ventricular tachycardia)    HTN (hypertension)    CAD (coronary artery disease)    Arrhythmia    AV block, 1st degree    DM (diabetes mellitus)    S/P TAVR (transcatheter aortic valve replacement)    H/O carotid endarterectomy  Right    A-V fistula  left arm 5/2017    H/O angioplasty  2013,  no  intervention    H/O left knee surgery    H/O circumcision  at  age  65

## 2020-09-17 ENCOUNTER — RESULT REVIEW (OUTPATIENT)
Age: 85
End: 2020-09-17

## 2020-09-17 PROCEDURE — 88305 TISSUE EXAM BY PATHOLOGIST: CPT | Mod: 26

## 2020-09-17 PROCEDURE — 38222 DX BONE MARROW BX & ASPIR: CPT | Mod: LT

## 2020-09-17 PROCEDURE — 88342 IMHCHEM/IMCYTCHM 1ST ANTB: CPT | Mod: 26,59

## 2020-09-17 PROCEDURE — 88313 SPECIAL STAINS GROUP 2: CPT | Mod: 26

## 2020-09-17 PROCEDURE — 88360 TUMOR IMMUNOHISTOCHEM/MANUAL: CPT | Mod: 26,59

## 2020-09-17 PROCEDURE — 90937 HEMODIALYSIS REPEATED EVAL: CPT

## 2020-09-17 PROCEDURE — 99233 SBSQ HOSP IP/OBS HIGH 50: CPT

## 2020-09-17 PROCEDURE — 85097 BONE MARROW INTERPRETATION: CPT

## 2020-09-17 PROCEDURE — 99232 SBSQ HOSP IP/OBS MODERATE 35: CPT

## 2020-09-17 PROCEDURE — 93308 TTE F-UP OR LMTD: CPT | Mod: 26

## 2020-09-17 PROCEDURE — 77012 CT SCAN FOR NEEDLE BIOPSY: CPT | Mod: 26

## 2020-09-17 PROCEDURE — 88341 IMHCHEM/IMCYTCHM EA ADD ANTB: CPT | Mod: 26,59

## 2020-09-17 PROCEDURE — 88189 FLOWCYTOMETRY/READ 16 & >: CPT

## 2020-09-17 RX ADMIN — Medication 60 MILLIGRAM(S): at 22:05

## 2020-09-17 RX ADMIN — ATORVASTATIN CALCIUM 80 MILLIGRAM(S): 80 TABLET, FILM COATED ORAL at 22:05

## 2020-09-17 RX ADMIN — SODIUM CHLORIDE 3 MILLILITER(S): 9 INJECTION INTRAMUSCULAR; INTRAVENOUS; SUBCUTANEOUS at 22:04

## 2020-09-17 RX ADMIN — ISOSORBIDE MONONITRATE 30 MILLIGRAM(S): 60 TABLET, EXTENDED RELEASE ORAL at 22:05

## 2020-09-17 RX ADMIN — DULOXETINE HYDROCHLORIDE 30 MILLIGRAM(S): 30 CAPSULE, DELAYED RELEASE ORAL at 13:18

## 2020-09-17 RX ADMIN — Medication 50 MILLIGRAM(S): at 22:05

## 2020-09-17 NOTE — PROGRESS NOTE ADULT - SUBJECTIVE AND OBJECTIVE BOX
Vascular & Interventional Radiology Pre-Procedure Note    Procedure Name: bone marrow biopsy     HPI: 86y Male with anemia    Allergies: Plavix (Hives)  Toprol-XL (Rash)    Medications (Abx/Cardiac/Anticoagulation/Blood Products)  doxazosin: 1 milliGRAM(s) Oral (09-16 @ 21:49)  furosemide   Injectable: 40 milliGRAM(s) IV Push (09-16 @ 19:32)  hydrALAZINE: 50 milliGRAM(s) Oral (09-17 @ 05:33)  isosorbide   mononitrate ER Tablet (IMDUR): 30 milliGRAM(s) Oral (09-16 @ 21:48)  NIFEdipine XL: 90 milliGRAM(s) Oral (09-17 @ 09:09)  NIFEdipine XL: 60 milliGRAM(s) Oral (09-16 @ 21:49)  torsemide: 20 milliGRAM(s) Oral (09-17 @ 05:33)    Data:    T(C): 36.9  HR: 87  BP: 146/56  RR: 18  SpO2: 98%    09-17    140  |  103  |  42.0<H>  ----------------------------<  125<H>  4.2   |  22.0  |  4.86<H>    Ca    9.2      17 Sep 2020 09:30  Phos  4.1     09-17  Mg     2.0     09-16    TPro  x   /  Alb  3.8  /  TBili  x   /  DBili  x   /  AST  x   /  ALT  x   /  AlkPhos  x   09-17                            9.5    6.38  )-----------( 156      ( 17 Sep 2020 09:36 )             29.1         Plan:   -86y Male presents for  bone marrow biopsy/aspiration  -Risks/Benefits/alternatives explained with the patient and/or healthcare proxy and witnessed informed consent obtained.

## 2020-09-17 NOTE — PROGRESS NOTE ADULT - SUBJECTIVE AND OBJECTIVE BOX
Pt seen and examined f/u for recurrent anemia and now a sudden drop in hemoglobin over two weeks.    This AM he has just undergone a BM asp and bx and feels well with no complaints. Denies any rectal bleeding or melena. Last HD was 4 days ago. Yesterday Hgb down to 7.3 and given one unit and today up to 9.5.      REVIEW OF SYSTEMS:    CONSTITUTIONAL: No fever, weight loss, or fatigue  EYES: No eye pain, visual disturbances, or discharge  ENMT:  No difficulty hearing, tinnitus, vertigo; No sinus or throat pain  RESPIRATORY: No cough, wheezing, chills or hemoptysis; No shortness of breath  CARDIOVASCULAR: No chest pain, palpitations, dizziness, or leg swelling  GASTROINTESTINAL: No abdominal or epigastric pain. No nausea, vomiting, or hematemesis; No diarrhea or constipation. No melena or hematochezia.    MEDICATIONS:  MEDICATIONS  (STANDING):  atorvastatin 80 milliGRAM(s) Oral daily  darbepoetin Injectable ViaL 200 MICROGram(s) SubCutaneous every 7 days  diphenhydrAMINE   Injectable 25 milliGRAM(s) IV Push once  doxazosin 1 milliGRAM(s) Oral at bedtime  DULoxetine 30 milliGRAM(s) Oral daily  hydrALAZINE 50 milliGRAM(s) Oral every 8 hours  influenza   Vaccine 0.5 milliLiter(s) IntraMuscular once  iron sucrose IVPB 100 milliGRAM(s) IV Intermittent every 7 days  isosorbide   mononitrate ER Tablet (IMDUR) 30 milliGRAM(s) Oral <User Schedule>  Nephro-pito 1 Tablet(s) Oral daily  NIFEdipine XL 90 milliGRAM(s) Oral <User Schedule>  NIFEdipine XL 60 milliGRAM(s) Oral <User Schedule>  pantoprazole  Injectable 40 milliGRAM(s) IV Push daily  sodium chloride 0.9% lock flush 3 milliLiter(s) IV Push every 8 hours  torsemide 20 milliGRAM(s) Oral daily    MEDICATIONS  (PRN):  hydrALAZINE Injectable 10 milliGRAM(s) IV Push every 2 hours PRN SBP > 180      Allergies    Plavix (Hives)  Toprol-XL (Rash)    Intolerances    provide vanilla nepro TID- RD ok (Unknown)      Vital Signs Last 24 Hrs  T(C): 36.9 (17 Sep 2020 05:29), Max: 36.9 (17 Sep 2020 05:29)  T(F): 98.4 (17 Sep 2020 05:29), Max: 98.4 (17 Sep 2020 05:29)  HR: 87 (17 Sep 2020 05:29) (80 - 87)  BP: 146/56 (17 Sep 2020 05:29) (144/66 - 168/70)  BP(mean): --  RR: 18 (17 Sep 2020 05:29) (18 - 18)  SpO2: 98% (17 Sep 2020 05:29) (98% - 99%)    09-16 @ 07:01  -  09-17 @ 07:00  --------------------------------------------------------  IN: 240 mL / OUT: 800 mL / NET: -560 mL        PHYSICAL EXAM:    General: Well developed; well nourished; in no acute distress  HEENT: MMM, conjunctiva and sclera clear  Gastrointestinal:Abdomen: Soft non-tender non-distended; Normal bowel sounds; No hepatosplenomegaly  Extremities: no cyanosis, clubbing or edema.  Skin: Warm and dry. No obvious rash    LABS:      CBC Full  -  ( 17 Sep 2020 09:36 )  WBC Count : 6.38 K/uL  RBC Count : 3.02 M/uL  Hemoglobin : 9.5 g/dL  Hematocrit : 29.1 %  Platelet Count - Automated : 156 K/uL  Mean Cell Volume : 96.4 fl  Mean Cell Hemoglobin : 31.5 pg  Mean Cell Hemoglobin Concentration : 32.6 gm/dL  Auto Neutrophil # : x  Auto Lymphocyte # : x  Auto Monocyte # : x  Auto Eosinophil # : x  Auto Basophil # : x  Auto Neutrophil % : x  Auto Lymphocyte % : x  Auto Monocyte % : x  Auto Eosinophil % : x  Auto Basophil % : x    09-17    140  |  103  |  42.0<H>  ----------------------------<  125<H>  4.2   |  22.0  |  4.86<H>    Ca    9.2      17 Sep 2020 09:30  Phos  4.1     09-17  Mg     2.0     09-16    TPro  x   /  Alb  3.8  /  TBili  x   /  DBili  x   /  AST  x   /  ALT  x   /  AlkPhos  x   09-17

## 2020-09-17 NOTE — PROGRESS NOTE ADULT - SUBJECTIVE AND OBJECTIVE BOX
Subjective: "I feel better today" NAD Pt denies chest pain, palpitations, dizziness, headache, shortness of breath, abdominal pain or N/V/D/C. Discussed patient case with daughter Vanessa at length.    VITAL SIGNS  Vital Signs Last 24 Hrs  T(C): 36.6 (20 @ 17:03), Max: 36.9 (20 @ 05:29)  T(F): 97.9 (20 @ 17:03), Max: 98.4 (20 @ 05:29)  HR: 95 (20 @ 17:03) (76 - 95)  BP: 151/71 (20 @ 17:03) (143/61 - 168/70)  RR: 18 (20 @ 17:03) (18 - 19)  SpO2: 100% (20 @ 17:03) (96% - 100%)  on (RA)              Telemetry/Alarms:  SR 1st deg, PVCs      MEDICATIONS  atorvastatin 80 milliGRAM(s) Oral daily  darbepoetin Injectable ViaL 200 MICROGram(s) SubCutaneous every 7 days  diphenhydrAMINE   Injectable 25 milliGRAM(s) IV Push once  doxazosin 1 milliGRAM(s) Oral at bedtime  DULoxetine 30 milliGRAM(s) Oral daily  hydrALAZINE 50 milliGRAM(s) Oral every 8 hours  hydrALAZINE Injectable 10 milliGRAM(s) IV Push every 2 hours PRN  influenza   Vaccine 0.5 milliLiter(s) IntraMuscular once  iron sucrose IVPB 100 milliGRAM(s) IV Intermittent every 7 days  isosorbide   mononitrate ER Tablet (IMDUR) 30 milliGRAM(s) Oral <User Schedule>  Nephro-pito 1 Tablet(s) Oral daily  NIFEdipine XL 90 milliGRAM(s) Oral <User Schedule>  NIFEdipine XL 60 milliGRAM(s) Oral <User Schedule>  pantoprazole  Injectable 40 milliGRAM(s) IV Push daily  sodium chloride 0.9% lock flush 3 milliLiter(s) IV Push every 8 hours  torsemide 20 milliGRAM(s) Oral daily      PHYSICAL EXAM  General: well nourished, well developed, no acute distress  Neurology: alert and oriented x 3, nonfocal, no gross deficits  Respiratory: few scattered crackles b/l bases posteriorly  CV: regular rate and rhythm, normal S1, S2 + systolic murmur  Abdomen: soft, nontender, nondistended, positive bowel sounds   Extremities: warm, well perfused. no edema. + DP pulses, R shin skin tear       @ 07:01  -   @ 07:00  --------------------------------------------------------  IN: 240 mL / OUT: 800 mL / NET: -560 mL     @ 07:  -   @ 19:47  --------------------------------------------------------  IN: 260 mL / OUT: 200 mL / NET: 60 mL        Weights:  Daily     Daily Weight in k.5 (17 Sep 2020 17:03)  Admit Wt: Drug Dosing Weight  Height (cm): 165.1 (14 Sep 2020 19:49)  Weight (kg): 68 (14 Sep 2020 19:49)  BMI (kg/m2): 24.9 (14 Sep 2020 19:49)  BSA (m2): 1.75 (14 Sep 2020 19:49)    LABS      140  |  103  |  42.0<H>  ----------------------------<  125<H>  4.2   |  22.0  |  4.86<H>    Ca    9.2      17 Sep 2020 09:30  Phos  4.1       Mg     2.0         TPro  x   /  Alb  3.8  /  TBili  x   /  DBili  x   /  AST  x   /  ALT  x   /  AlkPhos  x                                    9.5    6.38  )-----------( 156      ( 17 Sep 2020 09:36 )             29.1                PAST MEDICAL & SURGICAL HISTORY:  GI bleeding  due to ulcerated polyps and colonic AVMs    Aortic stenosis    ESRD on dialysis  HD on  and     Risk factors for obstructive sleep apnea    Anemia    RICHARDS (dyspnea on exertion)    VT (ventricular tachycardia)    HTN (hypertension)    CAD (coronary artery disease)    Arrhythmia    AV block, 1st degree    DM (diabetes mellitus)    S/P TAVR (transcatheter aortic valve replacement)    H/O carotid endarterectomy  Right    A-V fistula  left arm 2017    H/O angioplasty  ,  no  intervention    H/O left knee surgery    H/O circumcision  at  age  65

## 2020-09-17 NOTE — PROGRESS NOTE ADULT - ASSESSMENT
85 yo M with recent TAVR 8/21/2020 with Dr. Lilly, [facility] of ESRD new to HD, Chronic Anemia, HFpEF, HTN, AS, prior GI bleed from ulcerated polyps and colonic AVMs presented to HD 9/14 and was only able to tolerate 30 minutes.  Pt then had c/o dizziness and had syncopal episode at home several hours later and was taken to [facility] ED  At time of admission, pt's Hg was 4.9, Hct 16.1.  As per his family, pt has undergone Colonoscopy multiple times in the last several months which were negative for bleed.  UGI also negative for source of bleeding. Upon admission patient received 2 units PRBC with lasix. Brilinta/ASA held. Hematology and GI consults appreciated.   9/16 Abd CT R/O  RP  bleed> negative    Heme recommends  BMB after capsule study completed to R/O myelodysplasia as acute anemia more consistent with blood loss anemia(CT neg RP bleed)  Discussed w/ Dr Edwards>capsule study will be done outpt only  HD resumed 9/17 Bone marrow biopsy done 9/17

## 2020-09-17 NOTE — PROGRESS NOTE ADULT - PROBLEM SELECTOR PLAN 1
Baseline anemia secondary to CKD, h/o ulcerated polyps and colonic AVMs.  On aranesp in the outpatient setting, as well as ASA/Brilinta.  S/p 2 units RBCs with Lasix after first unit 9/15. 1 unit 9/16  Monitor CBC q 6 hrs.   Maintain Hgb > 7 g/dl.  Transfuse PRN. Current Hgb 9.5 after transfusion yesterday  Hematology input appreciated.   Bone Marrow Biopsy done 9/17  +FOBT  CT abd/pelvis w/out contrast neg  r/o RP bleed NEGATIVE  ASA and Brilinta HELD > likely just home with ASA 81  As per GI suggest Capsule Endoscopy on outpatient basis via GI office.

## 2020-09-17 NOTE — PROGRESS NOTE ADULT - ASSESSMENT
Completed HD tx. w/out complications.     AVF patent and functioning well.     A&Ox4. Denies SOB or chest pain.     V/S stable.     Report given to primary nurse by phone.    Long Discussion Held w. The patient, Wife, Daughter & Grand son,       Reviewed wShanice Galvez CTS - [place]

## 2020-09-17 NOTE — PROGRESS NOTE ADULT - SUBJECTIVE AND OBJECTIVE BOX
Patient was seen and evaluated on dialysis.   No c/o CP SOB NV  no F/C  no swelling    T(C): 36.8 (09-18-20 @ 09:55), Max: 37.3 (09-18-20 @ 05:44)  HR: 84 (09-18-20 @ 09:55) (76 - 95)  BP: 166/70 (09-18-20 @ 09:55) (138/62 - 166/70)  Wt(kg): --NA,  PE ;  NAD. Pale,   lungs - CTA  CV gr 1 murmur,  No gallop or rub  Abd : soft, NT BS +, No masses  Ext- No edema  Neuro : Grossly intact, moving extremities                         8.6    5.48  )-----------( 157      ( 18 Sep 2020 06:38 )             27.5      09-18    140  |  103  |  24.0<H>  ----------------------------<  97  3.4<L>   |  26.0  |  3.33<H>    Ca    8.7      18 Sep 2020 06:38  Phos  4.1     09-17  Mg     2.0     09-18    TPro  x   /  Alb  3.8  /  TBili  x   /  DBili  x   /  AST  x   /  ALT  x   /  AlkPhos  x   09-17      MEDICATIONS  (STANDING):  atorvastatin  darbepoetin Injectable ViaL  diphenhydrAMINE   Injectable  doxazosin  DULoxetine  hydrALAZINE  hydrALAZINE Injectable PRN  influenza   Vaccine  iron sucrose IVPB  isosorbide   mononitrate ER Tablet (IMDUR)  Nephro-pito  NIFEdipine XL  NIFEdipine XL  pantoprazole  Injectable  sodium chloride 0.9% lock flush  torsemide    Patient stable, Mild Dementia ( Vascular )  Josh HD easily,  Continue, TTE -P ;    HD Again in AM > Discharge. OP HD  150 min., TIW

## 2020-09-17 NOTE — PROGRESS NOTE ADULT - PROBLEM SELECTOR PLAN 1
etiology unclear. Rule out GI bleed but no melena or rectal bleeding. Possible primary hematological issue-BM asp and bx just done. Unable to do capsule as inpatient but can be arranged quickly as outpatient.Will follow.

## 2020-09-17 NOTE — PROGRESS NOTE ADULT - ASSESSMENT
The patient is a 86y Male with multiple medical issues including neuropathic pain from diabetic peripheral neuropathy.    Neuropathy with pain.   Had side effects from Gabapentin.  Cymbalta seems to be helping  Continue Duloxetine 30 mg daily    Anemia. CRF  Work up and management per medicine/renal.    Case discussed with Dr Avendano    will follow with you    Bassam Strong MD PhD   [id number]

## 2020-09-17 NOTE — PROGRESS NOTE ADULT - SUBJECTIVE AND OBJECTIVE BOX
· Procedure Name	Interventional Radiology  · Procedure Name	bonemarrow biopsy and aspiration  · TIME OUT	Patient's first and last name, , procedure, and correct site confirmed prior to the start of procedure.  · Procedure Date/Time	17-Sep-2020 10:40  · Informed Consent	Benefits, risks, and possible complications of procedure explained to patient/caregiver who verbalized understanding and gave verbal consent.  · Procedure Performed By	Myself  · Estimated Blood Loss	Minimal  · Complications	No complications  · Contrast	None  · Sedation	Provided by Anesthesia Department  · Local Anesthesia	1% Lidocaine  · Procedure Findings and Details	left posterior iliac bone    samples handled by dr mart  · Patient Condition/Disposition	Recovery, then floor if stable  · Plan	f/u results  [facility] DIVISION OF KIDNEY DISEASES AND HYPERTENSION -- FOLLOW UP NOTE  --------------------------------------------------------------------------------  Chief Complaint:    24 hour events/subjective:    PAST HISTORY  --------------------------------------------------------------------------------  No significant changes to PMH, PSH, FHx, SHx, unless otherwise noted    ALLERGIES & MEDICATIONS  --------------------------------------------------------------------------------  Allergies    Plavix (Hives)  Toprol-XL (Rash)    Intolerances    provide vanilla nepro TID- RD ok (Unknown)    Standing Inpatient Medications  atorvastatin 80 milliGRAM(s) Oral daily  darbepoetin Injectable ViaL 200 MICROGram(s) SubCutaneous every 7 days  diphenhydrAMINE   Injectable 25 milliGRAM(s) IV Push once  doxazosin 1 milliGRAM(s) Oral at bedtime  DULoxetine 30 milliGRAM(s) Oral daily  hydrALAZINE 50 milliGRAM(s) Oral every 8 hours  influenza   Vaccine 0.5 milliLiter(s) IntraMuscular once  iron sucrose IVPB 100 milliGRAM(s) IV Intermittent every 7 days  isosorbide   mononitrate ER Tablet (IMDUR) 30 milliGRAM(s) Oral <User Schedule>  Nephro-pito 1 Tablet(s) Oral daily  NIFEdipine XL 90 milliGRAM(s) Oral <User Schedule>  NIFEdipine XL 60 milliGRAM(s) Oral <User Schedule>  pantoprazole  Injectable 40 milliGRAM(s) IV Push daily  sodium chloride 0.9% lock flush 3 milliLiter(s) IV Push every 8 hours  torsemide 20 milliGRAM(s) Oral daily    PRN Inpatient Medications  hydrALAZINE Injectable 10 milliGRAM(s) IV Push every 2 hours PRN    REVIEW OF SYSTEMS  --------------------------------------------------------------------------------  Gen: No weight changes, fatigue, fevers/chills, weakness  Skin: No rashes  Head/Eyes/Ears/Mouth: No headache; Normal hearing; Normal vision w/o blurriness; No sinus pain/discomfort, sore throat  Respiratory: No dyspnea, cough, wheezing, hemoptysis  CV: No chest pain, PND, orthopnea  GI: No abdominal pain, diarrhea, constipation, nausea, vomiting, melena, hematochezia  : No increased frequency, dysuria, hematuria, nocturia  MSK: No joint pain/swelling; no back pain; no edema  Neuro: No dizziness/lightheadedness, weakness, seizures, numbness, tingling  Heme: No easy bruising or bleeding  Endo: No heat/cold intolerance  Psych: No significant nervousness, anxiety, stress, depression  All other systems were reviewed and are negative, except as noted.    VITALS/PHYSICAL EXAM  --------------------------------------------------------------------------------  T(C): 36.9 (20 @ 05:29), Max: 36.9 (20 @ 05:29)  HR: 87 (20 @ 05:29) (80 - 87)  BP: 146/56 (20 @ 05:29) (146/56 - 168/70)  RR: 18 (20 @ 05:29) (18 - 18)  SpO2: 98% (20 @ 05:29) (98% - 99%)    20 @ 07:01  -  20 @ 07:00  --------------------------------------------------------  IN: 240 mL / OUT: 800 mL / NET: -560 mL    Physical Exam:  	Gen: NAD, well-appearing  	HEENT: PERRL, supple neck, clear oropharynx  	Pulm: CTA B/L  	CV: RRR, S1S2; no rub  	Back: No spinal or CVA tenderness; no sacral edema  	Abd: +BS, soft, nontender/nondistended  	: No suprapubic tenderness  	UE: Warm, FROM, no clubbing, intact strength; no edema; no asterixis  	LE: Warm, FROM, no clubbing, intact strength; no edema  	Neuro: No focal deficits, intact gait  	Psych: Normal affect and mood  	Skin: Warm, without rashes  	Vascular access:    LABS/STUDIES  --------------------------------------------------------------------------------              9.5    6.38  >-----------<  156      [20 09:36]              29.1     140  |  103  |  42.0  ----------------------------<  125      [20 @ 09:30]  4.2   |  22.0  |  4.86        Ca     9.2     [20 09:30]      Mg     2.0     [20 06:32]      Phos  4.1     [20:30]    TPro  x   /  Alb  3.8  /  TBili  x   /  DBili  x   /  AST  x   /  ALT  x   /  AlkPhos  x   [20:30]    Creatinine Trend:  SCr 4.86 [:30]  SCr 4.71 [ 07:47]  SCr 4.27 [ 06:32]  SCr 2.99 [09-15 @ 05:57]  SCr 2.52 [ 22:11]    Urinalysis - [09-15-20 @ 02:01]      Color Yellow / Appearance Clear / SG 1.010 / pH 8.0      Gluc 100 / Ketone Negative  / Bili Negative / Urobili Negative       Blood Trace / Protein 100 / Leuk Est Trace / Nitrite Negative      RBC 0-2 / WBC 0-2 / Hyaline  / Gran  / Sq Epi  / Non Sq Epi Occasional / Bacteria Occasional    Iron 53, TIBC 266, %sat 20      [20 @ 06:32]  Ferritin 184      [20 06:32]  PTH -- (Ca 9.6)      [20 @ 16:08]   91  Vitamin D (25OH) 26.4      [20 @ 16:08]  HbA1c 4.9      [18 @ 05:54]  TSH 2.16      [09-15-20 @ 02:01]    HD after Discussion w. the family, this PM,

## 2020-09-17 NOTE — PROGRESS NOTE ADULT - SUBJECTIVE AND OBJECTIVE BOX
[facility]                                        Neurology at [place]                                  Ligia Sorenson, & Kings                                      [address]                                           [place], [place], [address]                                                [phone]        CC: diabetic peripheral neuropathy     HISTORY:  The patient is a 86y Male with multiple medical issues including Diabetes Mellitus with complications of renal failure now on hemodialysis and neuropathy.   He was admitted secondary to severe anemia. He is on CICU service as he is within 30 days of TAVR.   He reports a history of numbness and burning sensations in his feet.   This has been going on for a few years but has been worse for several months.   He had been started on Gabapentin 100 mg but had severe side effects including delirium after one or two doses. He had fallen on coming home from hemodialysis and was brought to the ER where the severe anemia was found.   Neurology is called for any other recommendations.  (JW)    Interval history: still with heel pain, pain in toes, cymbalta started 9/15/2020    ROS neurology: Denies headache or dizziness. Denies weakness.  (+) b/l foot numbness/paresthesia.  Denies speech/language deficits. Denies diplopia/blurred vision.  Denies confusion    MEDICATIONS  (STANDING):  atorvastatin 80 milliGRAM(s) Oral daily  darbepoetin Injectable ViaL 200 MICROGram(s) SubCutaneous every 7 days  diphenhydrAMINE   Injectable 25 milliGRAM(s) IV Push once  doxazosin 1 milliGRAM(s) Oral at bedtime  DULoxetine 30 milliGRAM(s) Oral daily  hydrALAZINE 50 milliGRAM(s) Oral every 8 hours  influenza   Vaccine 0.5 milliLiter(s) IntraMuscular once  iron sucrose IVPB 100 milliGRAM(s) IV Intermittent every 7 days  isosorbide   mononitrate ER Tablet (IMDUR) 30 milliGRAM(s) Oral <User Schedule>  Nephro-pito 1 Tablet(s) Oral daily  NIFEdipine XL 90 milliGRAM(s) Oral <User Schedule>  NIFEdipine XL 60 milliGRAM(s) Oral <User Schedule>  pantoprazole  Injectable 40 milliGRAM(s) IV Push daily  sodium chloride 0.9% lock flush 3 milliLiter(s) IV Push every 8 hours  torsemide 20 milliGRAM(s) Oral daily    MEDICATIONS  (PRN):  hydrALAZINE Injectable 10 milliGRAM(s) IV Push every 2 hours PRN SBP > 180      Vital Signs Last 24 Hrs  T(C): 36.6 (17 Sep 2020 13:11), Max: 36.9 (17 Sep 2020 05:29)  T(F): 97.9 (17 Sep 2020 13:11), Max: 98.4 (17 Sep 2020 05:29)  HR: 82 (17 Sep 2020 13:11) (76 - 87)  BP: 143/61 (17 Sep 2020 13:11) (143/61 - 168/70)  BP(mean): --  RR: 19 (17 Sep 2020 13:11) (18 - 19)  SpO2: 98% (17 Sep 2020 13:11) (98% - 99%)    Mental status: The patient is awake, alert, and fully oriented. There is no aphasia. Attention span is normal. Patient is aware of current events.     Cranial nerves:  Pupils react symmetrically to light. There is no visual field deficit to confrontation. Extraocular motion is full with no nystagmus. There is no ptosis. Facial sensation is intact. Facial musculature is symmetric. Palate elevates symmetrically. Tongue is midline.    Motor: There is normal bulk and tone.  Strength is 5/5 in the right arm and leg.   Strength is 5/5 in the left arm and leg.    Sensation: Decreased FT and pin in toes.     Reflexes: Trace throughout except ankles absent and plantar responses are flexor.    Cerebellar: There is no dysmetria on finger to nose testing.    LABS:                                             9.5    6.38  )-----------( 156      ( 17 Sep 2020 09:36 )             29.1     09-17    140  |  103  |  42.0<H>  ----------------------------<  125<H>  4.2   |  22.0  |  4.86<H>    Ca    9.2      17 Sep 2020 09:30  Phos  4.1     09-17  Mg     2.0     09-16    TPro  x   /  Alb  3.8  /  TBili  x   /  DBili  x   /  AST  x   /  ALT  x   /  AlkPhos  x   09-17    LIVER FUNCTIONS - ( 17 Sep 2020 09:30 )  Alb: 3.8 g/dL / Pro: x     / ALK PHOS: x     / ALT: x     / AST: x     / GGT: x               RADIOLOGY   CT head no acute CVA, mass or blood

## 2020-09-17 NOTE — PROGRESS NOTE ADULT - ASSESSMENT
87 yo M with recent TAVR 8/21/2020 with Dr. Lilly, [facility] of ESRD new to HD, Chronic Anemia, HFpEF, HTN, AS, prior GI bleed from ulcerated polyps and colonic AVMs presented to HD 9/14 and was only able to tolerate 30 minutes.  Pt then had c/o dizziness and had syncopal episode at home several hours later and was taken to [facility] ED  At time of admission, pt's Hg was 4.9, Hct 16.1.  As per his family, pt has undergone Colonoscopy multiple times in the last several months which were negative for bleed.  UGI also negative for source of bleeding. Upon admission patient received 2 units PRBC with lasix. Brilinta/ASA held. Hematology and GI consults appreciated.   9/16 Abd CT R/O  RP  bleed> negative    Heme recommends  BMB after capsule study completed to R/O myelodysplasia as acute anemia more consistent with blood loss anemia(CT neg RP bleed)  Discussed w/ Dr Edwards>capsule study will be done outpt only  Problem/Plan - 1:  ·  Problem: Acute anemia.   ·  Plan: Baseline anemia secondary to CKD, h/o ulcerated polyps and colonic AVMs.  On aranesp in the outpatient setting, as well as ASA/Brilinta.  S/p 2 units RBCs with Lasix after first unit 9/15.   Monitor CBC q 6 hrs.   Maintain Hgb > 7 g/dl.  Transfuse PRN.   Hematology input appreciated.   Consider bone marrow biopsy to r/o MDS, even though presenting more like acute blood loss anemia.   +FOBT  CT abd/pelvis w/out contrast neg  r/o RP bleed   given AC with ASA/Brillinta.  As per GI suggest Capsule Endoscopy on outpatient basis via GI office.   Problem/Plan - 2:  ·  Problem: ESRD on dialysis.   ·  Plan: Nephrology Dr. Avendano input appreciated.   Will re evaluate in am.   Problem/Plan - 3:  ·  Problem: HTN (hypertension).   ·  Plan: Continue home medications, Nifedipine, Hydralazine, Isosorbide.   Problem/Plan - 4:  ·  Problem: Aortic stenosis.   ·  Plan: S/p TAVR on 8/21/2020 with Dr. Lilly.   Problem/Plan - 5:  ·  Problem: Peripheral neuropathy.   ·  Plan: Worsening peripheral neuropathy symptoms.    Unable to tolerate gabapentin in the past.    Neurology consult appreciated.  Cymbalta therapy initiated.   Continue to monitor symptoms.   Problem/Plan - 6:  Problem: DM (diabetes mellitus).  Plan: HA1c 4.6.  BS goal .   Continue to monitor.  Problem/Plan - 7:  ·  Problem: Prophylactic measure.   ·  Plan: SCDs for DVT prophylaxis.  Pantoprazole for GI prophylaxis.

## 2020-09-18 ENCOUNTER — TRANSCRIPTION ENCOUNTER (OUTPATIENT)
Age: 85
End: 2020-09-18

## 2020-09-18 VITALS
TEMPERATURE: 99 F | RESPIRATION RATE: 18 BRPM | DIASTOLIC BLOOD PRESSURE: 58 MMHG | OXYGEN SATURATION: 95 % | HEART RATE: 74 BPM | SYSTOLIC BLOOD PRESSURE: 151 MMHG

## 2020-09-18 LAB
ANION GAP SERPL CALC-SCNC: 11 MMOL/L — SIGNIFICANT CHANGE UP (ref 5–17)
BUN SERPL-MCNC: 24 MG/DL — HIGH (ref 8–20)
CALCIUM SERPL-MCNC: 8.7 MG/DL — SIGNIFICANT CHANGE UP (ref 8.6–10.2)
CHLORIDE SERPL-SCNC: 103 MMOL/L — SIGNIFICANT CHANGE UP (ref 98–107)
CO2 SERPL-SCNC: 26 MMOL/L — SIGNIFICANT CHANGE UP (ref 22–29)
CREAT SERPL-MCNC: 3.33 MG/DL — HIGH (ref 0.5–1.3)
GLUCOSE SERPL-MCNC: 97 MG/DL — SIGNIFICANT CHANGE UP (ref 70–99)
HCT VFR BLD CALC: 27.5 % — LOW (ref 39–50)
HGB BLD-MCNC: 8.6 G/DL — LOW (ref 13–17)
MAGNESIUM SERPL-MCNC: 2 MG/DL — SIGNIFICANT CHANGE UP (ref 1.6–2.6)
MCHC RBC-ENTMCNC: 30.1 PG — SIGNIFICANT CHANGE UP (ref 27–34)
MCHC RBC-ENTMCNC: 31.3 GM/DL — LOW (ref 32–36)
MCV RBC AUTO: 96.2 FL — SIGNIFICANT CHANGE UP (ref 80–100)
PLATELET # BLD AUTO: 157 K/UL — SIGNIFICANT CHANGE UP (ref 150–400)
POTASSIUM SERPL-MCNC: 3.4 MMOL/L — LOW (ref 3.5–5.3)
POTASSIUM SERPL-SCNC: 3.4 MMOL/L — LOW (ref 3.5–5.3)
RBC # BLD: 2.86 M/UL — LOW (ref 4.2–5.8)
RBC # FLD: 18.9 % — HIGH (ref 10.3–14.5)
SODIUM SERPL-SCNC: 140 MMOL/L — SIGNIFICANT CHANGE UP (ref 135–145)
TM INTERPRETATION: SIGNIFICANT CHANGE UP
WBC # BLD: 5.48 K/UL — SIGNIFICANT CHANGE UP (ref 3.8–10.5)
WBC # FLD AUTO: 5.48 K/UL — SIGNIFICANT CHANGE UP (ref 3.8–10.5)

## 2020-09-18 PROCEDURE — 99232 SBSQ HOSP IP/OBS MODERATE 35: CPT

## 2020-09-18 PROCEDURE — 90937 HEMODIALYSIS REPEATED EVAL: CPT

## 2020-09-18 RX ORDER — DOXAZOSIN MESYLATE 4 MG
1 TABLET ORAL
Qty: 30 | Refills: 0
Start: 2020-09-18

## 2020-09-18 RX ORDER — NIFEDIPINE 30 MG
1 TABLET, EXTENDED RELEASE 24 HR ORAL
Qty: 0 | Refills: 0 | DISCHARGE
Start: 2020-09-18

## 2020-09-18 RX ORDER — ATORVASTATIN CALCIUM 80 MG/1
1 TABLET, FILM COATED ORAL
Qty: 0 | Refills: 0 | DISCHARGE
Start: 2020-09-18

## 2020-09-18 RX ORDER — PANTOPRAZOLE SODIUM 20 MG/1
1 TABLET, DELAYED RELEASE ORAL
Qty: 30 | Refills: 0
Start: 2020-09-18

## 2020-09-18 RX ORDER — DARBEPOETIN ALFA IN POLYSORBAT 200MCG/0.4
200 PEN INJECTOR (ML) SUBCUTANEOUS ONCE
Refills: 0 | Status: COMPLETED | OUTPATIENT
Start: 2020-09-18 | End: 2020-09-18

## 2020-09-18 RX ORDER — HYDRALAZINE HCL 50 MG
1 TABLET ORAL
Qty: 0 | Refills: 0 | DISCHARGE

## 2020-09-18 RX ORDER — HYDRALAZINE HCL 50 MG
1 TABLET ORAL
Qty: 0 | Refills: 0 | DISCHARGE
Start: 2020-09-18

## 2020-09-18 RX ORDER — ACETAMINOPHEN 500 MG
650 TABLET ORAL ONCE
Refills: 0 | Status: COMPLETED | OUTPATIENT
Start: 2020-09-18 | End: 2020-09-18

## 2020-09-18 RX ORDER — ERYTHROPOIETIN 10000 [IU]/ML
40000 INJECTION, SOLUTION INTRAVENOUS; SUBCUTANEOUS ONCE
Refills: 0 | Status: DISCONTINUED | OUTPATIENT
Start: 2020-09-18 | End: 2020-09-18

## 2020-09-18 RX ORDER — DULOXETINE HYDROCHLORIDE 30 MG/1
1 CAPSULE, DELAYED RELEASE ORAL
Qty: 30 | Refills: 0
Start: 2020-09-18

## 2020-09-18 RX ADMIN — Medication 50 MILLIGRAM(S): at 05:47

## 2020-09-18 RX ADMIN — Medication 90 MILLIGRAM(S): at 11:04

## 2020-09-18 RX ADMIN — Medication 50 MILLIGRAM(S): at 14:47

## 2020-09-18 RX ADMIN — SODIUM CHLORIDE 3 MILLILITER(S): 9 INJECTION INTRAMUSCULAR; INTRAVENOUS; SUBCUTANEOUS at 14:49

## 2020-09-18 RX ADMIN — Medication 650 MILLIGRAM(S): at 12:23

## 2020-09-18 RX ADMIN — DULOXETINE HYDROCHLORIDE 30 MILLIGRAM(S): 30 CAPSULE, DELAYED RELEASE ORAL at 11:04

## 2020-09-18 RX ADMIN — PANTOPRAZOLE SODIUM 40 MILLIGRAM(S): 20 TABLET, DELAYED RELEASE ORAL at 11:04

## 2020-09-18 RX ADMIN — Medication 200 MICROGRAM(S): at 15:11

## 2020-09-18 RX ADMIN — Medication 1 TABLET(S): at 11:04

## 2020-09-18 RX ADMIN — Medication 20 MILLIGRAM(S): at 05:47

## 2020-09-18 RX ADMIN — SODIUM CHLORIDE 3 MILLILITER(S): 9 INJECTION INTRAMUSCULAR; INTRAVENOUS; SUBCUTANEOUS at 05:46

## 2020-09-18 RX ADMIN — Medication 650 MILLIGRAM(S): at 13:20

## 2020-09-18 NOTE — DISCHARGE NOTE PROVIDER - HOSPITAL COURSE
85 yo M with recent TAVR 8/21/2020 with Dr. Lilly, [facility] of ESRD new to HD, Chronic Anemia, HFpEF, HTN, AS, prior GI bleed from ulcerated polyps and colonic AVMs presented to HD 9/14 and was only able to tolerate 30 minutes.  Pt then had c/o dizziness and had syncopal episode at home several hours later and was taken to [facility] ED  At time of admission, pt's Hg was 4.9, Hct 16.1.  As per his family, pt has undergone Colonoscopy multiple times in the last several months which were negative for bleed.  UGI also negative for source of bleeding. Upon admission patient received 2 units PRBC with lasix. Brilinta/ASA held. Hematology and GI consults appreciated.   9/16 Abd CT R/O  RP  bleed> negative    Heme recommends  BMB after capsule study completed to R/O myelodysplasia as acute anemia more consistent with blood loss anemia(CT neg RP bleed)  Discussed w/ Dr Edwards>capsule study will be done outpt only  HD resumed 9/17 Bone marrow biopsy done 9/17    Patient had HD today and will continue HD next week.  No Brilinta in the setting of low HCT and chronic anemia.  Discharge on ASA 81 daily as per Dr Lilly

## 2020-09-18 NOTE — PROGRESS NOTE ADULT - ASSESSMENT
86y Male with multiple medical issues including neuropathic pain from diabetic peripheral neuropathy.    Neuropathy with pain.   Had side effects from Gabapentin.  Cymbalta seems to be helping  Continue Duloxetine 30 mg daily    Anemia. CRF  Work up and management per medicine/renal.    Can follow up with neurology as outpatient.

## 2020-09-18 NOTE — PROGRESS NOTE ADULT - PROBLEM SELECTOR PLAN 1
Baseline anemia secondary to CKD, h/o ulcerated polyps and colonic AVMs.  On Aranesp in the outpatient setting, as well as ASA/Brilinta.  S/p 2 units RBCs unit 9/15. 1 unit 9/16  Monitor CBC daily and PRN  Maintain Hgb > 7 g/dl.  Transfuse PRN. Current Hgb 9.5 (9/17)  Hematology input appreciated.   Bone Marrow Biopsy done 9/17  +FOBT  CT abd/pelvis w/out contrast neg  r/o RP bleed NEGATIVE  ASA and Brilinta HELD > likely just home with ASA 81  As per GI suggest Capsule Endoscopy on outpatient basis via GI office.

## 2020-09-18 NOTE — PROGRESS NOTE ADULT - PROBLEM SELECTOR PLAN 6
HA1c 4.6.  BS goal .   Continue to monitor.

## 2020-09-18 NOTE — DISCHARGE NOTE NURSING/CASE MANAGEMENT/SOCIAL WORK - PATIENT PORTAL LINK FT
You can access the FollowMyHealth Patient Portal offered by [facility] by registering at the following website: [email or web address]. By joining [email or web address]’s FollowMyHealth portal, you will also be able to view your health information using other applications (apps) compatible with our system.

## 2020-09-18 NOTE — PROGRESS NOTE ADULT - SUBJECTIVE AND OBJECTIVE BOX
INTERVAL HPI/OVERNIGHT EVENTS:FU for anemia. s/p Bone marrow aspiration. Doing ok. No complaints. Getting HD. No rectal bleeding    MEDICATIONS  (STANDING):  atorvastatin 80 milliGRAM(s) Oral daily  darbepoetin Injectable ViaL 200 MICROGram(s) SubCutaneous every 7 days  diphenhydrAMINE   Injectable 25 milliGRAM(s) IV Push once  doxazosin 1 milliGRAM(s) Oral at bedtime  DULoxetine 30 milliGRAM(s) Oral daily  hydrALAZINE 50 milliGRAM(s) Oral every 8 hours  influenza   Vaccine 0.5 milliLiter(s) IntraMuscular once  iron sucrose IVPB 100 milliGRAM(s) IV Intermittent every 7 days  isosorbide   mononitrate ER Tablet (IMDUR) 30 milliGRAM(s) Oral <User Schedule>  Nephro-pito 1 Tablet(s) Oral daily  NIFEdipine XL 90 milliGRAM(s) Oral <User Schedule>  NIFEdipine XL 60 milliGRAM(s) Oral <User Schedule>  pantoprazole  Injectable 40 milliGRAM(s) IV Push daily  sodium chloride 0.9% lock flush 3 milliLiter(s) IV Push every 8 hours  torsemide 20 milliGRAM(s) Oral daily    MEDICATIONS  (PRN):  hydrALAZINE Injectable 10 milliGRAM(s) IV Push every 2 hours PRN SBP > 180      Allergies    Plavix (Hives)  Toprol-XL (Rash)    Intolerances    provide vanilla nepro TID- RD ok (Unknown)      Vital Signs Last 24 Hrs  T(C): 36.8 (18 Sep 2020 09:55), Max: 37.3 (18 Sep 2020 05:44)  T(F): 98.2 (18 Sep 2020 09:55), Max: 99.1 (18 Sep 2020 05:44)  HR: 96 (18 Sep 2020 11:00) (76 - 96)  BP: 137/64 (18 Sep 2020 11:00) (137/64 - 166/70)  BP(mean): --  RR: 16 (18 Sep 2020 11:00) (16 - 18)  SpO2: 95% (18 Sep 2020 11:00) (95% - 100%)    LABS:                        8.6    5.48  )-----------( 157      ( 18 Sep 2020 06:38 )             27.5     09-18    140  |  103  |  24.0<H>  ----------------------------<  97  3.4<L>   |  26.0  |  3.33<H>    Ca    8.7      18 Sep 2020 06:38  Phos  4.1     09-17  Mg     2.0     09-18    TPro  x   /  Alb  3.8  /  TBili  x   /  DBili  x   /  AST  x   /  ALT  x   /  AlkPhos  x   09-17          RADIOLOGY & ADDITIONAL TESTS:

## 2020-09-18 NOTE — PROGRESS NOTE ADULT - ASSESSMENT
Anemia: Needs optimization of iron supplementation, procrit and await Bone marrow biopsy. Follow up with me as outpatient for capsule endoscopy consideration.

## 2020-09-18 NOTE — PROGRESS NOTE ADULT - SUBJECTIVE AND OBJECTIVE BOX
[facility]                                        Neurology at [place]                                 Ligia Sorenson, & Kings                                  [address]                                        [place], [place], [address]                                             [phone]        CC: diabetic peripheral neuropathy     HISTORY:  The patient is a 86y Male with multiple medical issues including Diabetes Mellitus with complications of renal failure now on hemodialysis and neuropathy.   He was admitted secondary to severe anemia. He is on CICU service as he is within 30 days of TAVR.   He reports a history of numbness and burning sensations in his feet.   This has been going on for a few years but has been worse for several months.   He had been started on Gabapentin 100 mg but had severe side effects including delirium after one or two doses. He had fallen on coming home from hemodialysis and was brought to the ER where the severe anemia was found.   Neurology is called for any other recommendations.    Interim history:  On 3 [place].     ROS:   Denies headache or dizziness.  Denies chest pain.  Denies shortness of breath.    MEDICATIONS  (STANDING):  atorvastatin 80 milliGRAM(s) Oral daily  darbepoetin Injectable ViaL 200 MICROGram(s) SubCutaneous every 7 days  diphenhydrAMINE   Injectable 25 milliGRAM(s) IV Push once  doxazosin 1 milliGRAM(s) Oral at bedtime  DULoxetine 30 milliGRAM(s) Oral daily  hydrALAZINE 50 milliGRAM(s) Oral every 8 hours  influenza   Vaccine 0.5 milliLiter(s) IntraMuscular once  iron sucrose IVPB 100 milliGRAM(s) IV Intermittent every 7 days  isosorbide   mononitrate ER Tablet (IMDUR) 30 milliGRAM(s) Oral <User Schedule>  Nephro-pito 1 Tablet(s) Oral daily  NIFEdipine XL 90 milliGRAM(s) Oral <User Schedule>  NIFEdipine XL 60 milliGRAM(s) Oral <User Schedule>  pantoprazole  Injectable 40 milliGRAM(s) IV Push daily  sodium chloride 0.9% lock flush 3 milliLiter(s) IV Push every 8 hours  torsemide 20 milliGRAM(s) Oral daily      Vital Signs Last 24 Hrs  T(C): 36.8 (18 Sep 2020 09:55), Max: 37.3 (18 Sep 2020 05:44)  T(F): 98.2 (18 Sep 2020 09:55), Max: 99.1 (18 Sep 2020 05:44)  HR: 96 (18 Sep 2020 11:00) (76 - 96)  BP: 137/64 (18 Sep 2020 11:00) (137/64 - 166/70)  RR: 16 (18 Sep 2020 11:00) (16 - 19)  SpO2: 95% (18 Sep 2020 11:00) (95% - 100%)    Detailed Neurologic Exam:    Mental status: The patient is awake, alert, and fully oriented. There is no aphasia. Attention span is normal. Patient is aware of current events.     Cranial nerves:  Pupils react symmetrically to light. There is no visual field deficit to confrontation. Extraocular motion is full with no nystagmus. There is no ptosis. Facial sensation is intact. Facial musculature is symmetric. Palate elevates symmetrically. Tongue is midline.    Motor: There is normal bulk and tone.  Strength is 5/5 in the right arm and leg.   Strength is 5/5 in the left arm and leg.    Sensation: Decreased FT and pin in toes.     Reflexes: Trace throughout except ankles absent and plantar responses are flexor.    Cerebellar: There is no dysmetria on finger to nose testing.    Labs:     09-18    140  |  103  |  24.0<H>  ----------------------------<  97  3.4<L>   |  26.0  |  3.33<H>    Ca    8.7      18 Sep 2020 06:38  Phos  4.1     09-17  Mg     2.0     09-18    TPro  x   /  Alb  3.8  /  TBili  x   /  DBili  x   /  AST  x   /  ALT  x   /  AlkPhos  x   09-17                            8.6    5.48  )-----------( 157      ( 18 Sep 2020 06:38 )             27.5

## 2020-09-18 NOTE — PROGRESS NOTE ADULT - PROBLEM SELECTOR PLAN 7
SCDs for DVT prophylaxis.  Pantoprazole for GI prophylaxis.    Plan to discuss with CTS team on AM rounds
SCDs for DVT prophylaxis.  Pantoprazole for GI prophylaxis.    D/W Dr Lilly
SCDs for DVT prophylaxis.  Pantoprazole for GI prophylaxis.    Plan of care to be discussed with CT surgery attending / team in AM.
SCDs for DVT prophylaxis.  Pantoprazole for GI prophylaxis.    Plan of care to be discussed with CT surgery attending / team in AM.

## 2020-09-18 NOTE — DISCHARGE NOTE PROVIDER - NSDCMRMEDTOKEN_GEN_ALL_CORE_FT
aspirin 81 mg oral delayed release tablet: 1 tab(s) orally once a day  atorvastatin 80 mg oral tablet: 1 tab(s) orally once a day  doxazosin 1 mg oral tablet: 1 tab(s) orally once a day (at bedtime)  DULoxetine 30 mg oral delayed release capsule: 1 cap(s) orally once a day  hydrALAZINE 50 mg oral tablet: 1 tab(s) orally every 8 hours  isosorbide mononitrate 30 mg oral tablet, extended release: 1 tab(s) orally once a day  Nephro-Thomas oral tablet: 1 tab(s) orally once a day  NIFEdipine 90 mg oral tablet, extended release: 1 tab(s) orally   Protonix 40 mg oral delayed release tablet: 1 tab(s) orally once a day   torsemide 20 mg oral tablet: 1 tab(s) orally once a day

## 2020-09-18 NOTE — DISCHARGE NOTE PROVIDER - CARE PROVIDER_API CALL
Stephon Baker)  Hematology; Internal Medicine; Medical Oncology  [address] [place] [address]  Phone: [phone]  Fax: [phone]  Follow Up Time:     Mary Ann Giron  Gastroenterology  [address] [place] [address]  Phone: [phone]  Fax: [phone]  Follow Up Time:     TATO GOMEZ  Cardiology  [address] [place] [address]  Phone: [phone]  Fax: [phone]  Follow Up Time:

## 2020-09-18 NOTE — PROGRESS NOTE ADULT - SUBJECTIVE AND OBJECTIVE BOX
[facility] DIVISION OF KIDNEY DISEASES AND HYPERTENSION -- HEMODIALYSIS NOTE  --------------------------------------------------------------------------------  Chief Complaint: ESRD/Ongoing hemodialysis requirement    24 hour events/subjective:  Pt seen/examined on HD;    PAST HISTORY  --------------------------------------------------------------------------------  No significant changes to PMH, PSH, FHx, SHx, unless otherwise noted    ALLERGIES & MEDICATIONS  --------------------------------------------------------------------------------  Allergies    Plavix (Hives)  Toprol-XL (Rash)    Intolerances    provide vanilla nepro TID- RD ok (Unknown)    Standing Inpatient Medications  atorvastatin 80 milliGRAM(s) Oral daily  darbepoetin Injectable ViaL 200 MICROGram(s) SubCutaneous every 7 days  diphenhydrAMINE   Injectable 25 milliGRAM(s) IV Push once  doxazosin 1 milliGRAM(s) Oral at bedtime  DULoxetine 30 milliGRAM(s) Oral daily  hydrALAZINE 50 milliGRAM(s) Oral every 8 hours  influenza   Vaccine 0.5 milliLiter(s) IntraMuscular once  iron sucrose IVPB 100 milliGRAM(s) IV Intermittent every 7 days  isosorbide   mononitrate ER Tablet (IMDUR) 30 milliGRAM(s) Oral <User Schedule>  Nephro-pito 1 Tablet(s) Oral daily  NIFEdipine XL 90 milliGRAM(s) Oral <User Schedule>  NIFEdipine XL 60 milliGRAM(s) Oral <User Schedule>  pantoprazole  Injectable 40 milliGRAM(s) IV Push daily  sodium chloride 0.9% lock flush 3 milliLiter(s) IV Push every 8 hours  torsemide 20 milliGRAM(s) Oral daily    PRN Inpatient Medications  hydrALAZINE Injectable 10 milliGRAM(s) IV Push every 2 hours PRN      REVIEW OF SYSTEMS  --------------------------------------------------------------------------------  Gen: No weight changes, fatigue, fevers/chills, weakness  Skin: No rashes  Head/Eyes/Ears/Mouth: No headache; Normal hearing; Normal vision w/o blurriness; No sinus pain/discomfort, sore throat  Respiratory: No dyspnea, cough, wheezing, hemoptysis  CV: No chest pain, PND, orthopnea  GI: No abdominal pain, diarrhea, constipation, nausea, vomiting, melena, hematochezia  : No increased frequency, dysuria, hematuria, nocturia  MSK: No joint pain/swelling; no back pain; no edema  Neuro: No dizziness/lightheadedness, weakness, seizures, numbness, tingling  Heme: No easy bruising or bleeding  Endo: No heat/cold intolerance  Psych: No significant nervousness, anxiety, stress, depression    All other systems were reviewed and are negative, except as noted.    VITALS/PHYSICAL EXAM  --------------------------------------------------------------------------------  T(C): 36.8 (09-18-20 @ 09:55), Max: 37.3 (09-18-20 @ 05:44)  HR: 96 (09-18-20 @ 11:00) (76 - 96)  BP: 137/64 (09-18-20 @ 11:00) (137/64 - 166/70)  RR: 16 (09-18-20 @ 11:00) (16 - 18)  SpO2: 95% (09-18-20 @ 11:00) (95% - 100%)  Wt(kg): --        09-17-20 @ 07:01  -  09-18-20 @ 07:00  --------------------------------------------------------  IN: 980 mL / OUT: 900 mL / NET: 80 mL    09-18-20 @ 07:01  -  09-18-20 @ 13:31  --------------------------------------------------------  IN: 0 mL / OUT: 500 mL / NET: -500 mL      Physical Exam:  NAD. Pale,   lungs - CTA  CV gr 1 murmur,  No gallop or rub  Abd : soft, NT BS +, No masses  Ext- No edema  Neuro : Grossly intact, moving extremities     LABS/STUDIES  --------------------------------------------------------------------------------              8.6    5.48  >-----------<  157      [09-18-20 @ 06:38]              27.5     140  |  103  |  24.0  ----------------------------<  97      [09-18-20 @ 06:38]  3.4   |  26.0  |  3.33        Ca     8.7     [09-18-20 @ 06:38]      Mg     2.0     [09-18-20 @ 06:38]      Phos  4.1     [09-17-20 @ 09:30]    TPro  x   /  Alb  3.8  /  TBili  x   /  DBili  x   /  AST  x   /  ALT  x   /  AlkPhos  x   [09-17-20 @ 09:30]          Iron 53, TIBC 266, %sat 20      [08-19-20 @ 06:32]  Ferritin 184      [08-19-20 @ 06:32]  PTH -- (Ca 9.6)      [08-19-20 @ 16:08]   91  Vitamin D (25OH) 26.4      [08-19-20 @ 16:08]  HbA1c 4.9      [08-09-18 @ 05:54]  TSH 2.16      [09-15-20 @ 02:01]

## 2020-09-18 NOTE — PROGRESS NOTE ADULT - PROVIDER SPECIALTY LIST ADULT
CT Surgery
Cardiology
Gastroenterology
Gastroenterology
Heme/Onc
Intervent Radiology
Nephrology
Neurology
Nephrology
CT Surgery

## 2020-09-18 NOTE — DISCHARGE NOTE PROVIDER - PROVIDER TOKENS
PROVIDER:[TOKEN:[[id number]]],PROVIDER:[TOKEN:[[id number]]],PROVIDER:[TOKEN:[[id number]]]

## 2020-09-18 NOTE — DISCHARGE NOTE PROVIDER - NSDCFUADDAPPT_GEN_ALL_CORE_FT
Please follow up with your Cardiologist and Primary care Doctor 2 weeks from discharge     Please follow up with Dr Baker within 1 week.  Please call office to Make Appointment Please follow up with your Cardiologist and Primary care Doctor 2 weeks from discharge     Please follow up with Dr Baker within 1 week.  Please call office to Make Appointment

## 2020-09-18 NOTE — DISCHARGE NOTE PROVIDER - NSDCFUSCHEDAPPT_GEN_ALL_CORE_FT
PROETTA, CHRISTIANO ; 09/21/2020 ; [facility] [address]  PROETTA, CHRISTIANO ; 09/29/2020 ; [facility]  PROETTA, CHRISTIANO ; 10/09/2020 ; [facility] Nephro [address]  PROETTA, CHRISTIANO ; 10/13/2020 ; [facility]  PROETTA, CHRISTIANO ; 10/26/2020 ; [facility] [address]  PROETTA, CHRISTIANO ; 10/27/2020 ; [facility]  PROETTA, CHRISTIANO ; 11/10/2020 ; [facility]  PROETTA, CHRISTIANO ; 11/24/2020 ; [facility]  PROETTA, CHRISTIANO ; 11/24/2020 ; [facility]

## 2020-09-18 NOTE — PROGRESS NOTE ADULT - ASSESSMENT
1) ESRD on HD  2) Anemia of renal dx  3) Renal bone dx  4) Vol HTN    HD today; tolerating  On iron IV;  darbo q weekly;  dw CTS

## 2020-09-18 NOTE — DISCHARGE NOTE PROVIDER - NSDCCPCAREPLAN_GEN_ALL_CORE_FT
PRINCIPAL DISCHARGE DIAGNOSIS  Diagnosis: Acute anemia  Assessment and Plan of Treatment:       SECONDARY DISCHARGE DIAGNOSES  Diagnosis: ESRD on dialysis  Assessment and Plan of Treatment: HD on Mondays and Fridays    Diagnosis: Near syncope  Assessment and Plan of Treatment:

## 2020-09-18 NOTE — PROGRESS NOTE ADULT - SUBJECTIVE AND OBJECTIVE BOX
Subjective: Patient lying in bed, no acute distress noted, stated "feels better" denies chest pain, shortness of breath, dizziness, abdominal pain, N/V/D.     VITAL SIGNS  Vital Signs Last 24 Hrs  T(C): 36.9 (20 @ 22:00), Max: 36.9 (20 @ 05:29)  T(F): 98.4 (20 @ 22:00), Max: 98.4 (20 @ 05:29)  HR: 76 (20 @ 22:00) (76 - 95)  BP: 159/56 (20 @ 22:00) (138/62 - 159/56)  RR: 16 (20 @ 22:00) (16 - 19)  SpO2: 97% (20 @ 22:00) (96% - 100%)  on room air         Telemetry/Alarms:  SR      MEDICATIONS  atorvastatin 80 milliGRAM(s) Oral daily  darbepoetin Injectable ViaL 200 MICROGram(s) SubCutaneous every 7 days  diphenhydrAMINE   Injectable 25 milliGRAM(s) IV Push once  doxazosin 1 milliGRAM(s) Oral at bedtime  DULoxetine 30 milliGRAM(s) Oral daily  hydrALAZINE 50 milliGRAM(s) Oral every 8 hours  hydrALAZINE Injectable 10 milliGRAM(s) IV Push every 2 hours PRN  influenza   Vaccine 0.5 milliLiter(s) IntraMuscular once  iron sucrose IVPB 100 milliGRAM(s) IV Intermittent every 7 days  isosorbide   mononitrate ER Tablet (IMDUR) 30 milliGRAM(s) Oral <User Schedule>  Nephro-pito 1 Tablet(s) Oral daily  NIFEdipine XL 90 milliGRAM(s) Oral <User Schedule>  NIFEdipine XL 60 milliGRAM(s) Oral <User Schedule>  pantoprazole  Injectable 40 milliGRAM(s) IV Push daily  sodium chloride 0.9% lock flush 3 milliLiter(s) IV Push every 8 hours  torsemide 20 milliGRAM(s) Oral daily      PHYSICAL EXAM  General: well nourished, well developed, no acute distress  Neurology: alert and oriented x 3, nonfocal, no gross deficits  Respiratory: clear to auscultation bilaterally  CV: regular rate and rhythm, normal S1, S2, +systolic murmur  Abdomen: soft, nontender, nondistended, positive bowel sounds,  Extremities: warm, well perfused. no edema. + DP pulses bilaterally. Left UE AV fistula +thrill  Incisions: Bilateral groin dry and intact.  Psych: Appropriate mood and affect       @ 07:  -   @ 07:00  --------------------------------------------------------  IN: 240 mL / OUT: 800 mL / NET: -560 mL     @ 07:  -   @ 03:25  --------------------------------------------------------  IN: 740 mL / OUT: 700 mL / NET: 40 mL        Weights:  Daily     Daily Weight in k (17 Sep 2020 20:00)  Admit Wt: Drug Dosing Weight  Height (cm): 165.1 (14 Sep 2020 19:49)  Weight (kg): 68 (14 Sep 2020 19:49)  BMI (kg/m2): 24.9 (14 Sep 2020 19:49)  BSA (m2): 1.75 (14 Sep 2020 19:49)    All laboratory results, radiology and medications reviewed.    LABS      140  |  103  |  42.0<H>  ----------------------------<  125<H>  4.2   |  22.0  |  4.86<H>    Ca    9.2      17 Sep 2020 09:30  Phos  4.1       Mg     2.0         TPro  x   /  Alb  3.8  /  TBili  x   /  DBili  x   /  AST  x   /  ALT  x   /  AlkPhos  x                                    9.5    6.38  )-----------( 156      ( 17 Sep 2020 09:36 )             29.1            < from: CT Chest No Cont (20 @ 10:41) >    IMPRESSION:  No retroperitoneal bleed.    Mild ascites in the pelvis of uncertain etiology. Please correlate clinically. Small bilateral pleural effusions.      < end of copied text >    < from: CT Head No Cont (20 @ 20:45) >    IMPRESSION:  Mild chronic microvascular changes without evidence of an acute transcortical infarction or hemorrhage.        < end of copied text >      PAST MEDICAL & SURGICAL HISTORY:  GI bleeding  due to ulcerated polyps and colonic AVMs    Aortic stenosis    ESRD on dialysis  HD on  and     Risk factors for obstructive sleep apnea    Anemia    RICHARDS (dyspnea on exertion)    VT (ventricular tachycardia)    HTN (hypertension)    CAD (coronary artery disease)    Arrhythmia    AV block, 1st degree    DM (diabetes mellitus)    S/P TAVR (transcatheter aortic valve replacement)    H/O carotid endarterectomy  Right    A-V fistula  left arm 2017    H/O angioplasty  ,  no  intervention    H/O left knee surgery    H/O circumcision  at  age  65

## 2020-09-18 NOTE — DISCHARGE NOTE PROVIDER - NSDCACTIVITY_GEN_ALL_CORE
Walking - Outdoors allowed/Walking - Indoors allowed/Showering allowed/Stairs allowed/No heavy lifting/straining/Do not drive or operate machinery

## 2020-09-18 NOTE — DISCHARGE NOTE NURSING/CASE MANAGEMENT/SOCIAL WORK - NSDCFUADDAPPT_GEN_ALL_CORE_FT
Please follow up with your Cardiologist and Primary care Doctor 2 weeks from discharge     Please follow up with Dr Baker within 1 week.  Please call office to Make Appointment

## 2020-09-18 NOTE — DISCHARGE NOTE PROVIDER - CARE PROVIDERS DIRECT ADDRESSES
,[email or web address],DirectAddress_Unknown,DirectAddress_Unknown

## 2020-09-18 NOTE — DISCHARGE NOTE PROVIDER - NSDCFUADDINST_GEN_ALL_CORE_FT
Choose lean meats and poultry without skin and prepare them without added saturated and trans fat.  Eat fish at least twice a week. Recent research shows that eating oily fish containing omega-3 fatty acids (for example, salmon, trout and herring) may help lower your risk of death from coronary artery disease.  Select fat-free, 1 percent fat and low-fat dairy products.  Cut back on foods containing partially hydrogenated vegetable oils to reduce trans fat in your diet.   To lower cholesterol, reduce saturated fat to no more than 5 to 6 percent of total calories. For someone eating 2,000 calories a day, that’s about 13 grams of saturated fat.  Cut back on beverages and foods with added sugars.  Choose and prepare foods with little or no salt. To lower blood pressure, aim to eat no more than 2,400 milligrams of sodium per day. Reducing daily intake to 1,500 mg is desirable because it can lower blood pressure even further.  If you drink alcohol, drink in moderation. That means one drink per day if you’re a woman and two drinks  per day if you’re a man.  Follow the American Heart Association recommendations when you eat out, and keep an eye on your portion sizes.   Please come to ED or Call Cardio thoracic office at [phone] if Chest pain, Shortness of Breath,  Nausea & vomiting,  2 lb increase in weight in 24 hours.

## 2020-09-18 NOTE — PROGRESS NOTE ADULT - PROBLEM SELECTOR PLAN 3
Continue home medications, Nifedipine, Hydralazine, Isosorbide.

## 2020-09-18 NOTE — PROGRESS NOTE ADULT - PROBLEM SELECTOR PLAN 5
Worsening peripheral neuropathy symptoms.    Unable to tolerate gabapentin in the past.    Neurology consult appreciated.  Cymbalta therapy initiated, tolerating  Continue Duloxetine  Continue to monitor symptoms.
Worsening peripheral neuropathy symptoms.    Unable to tolerate gabapentin in the past.    Neurology consult appreciated.  Cymbalta therapy initiated.   Continue to monitor symptoms.

## 2020-09-18 NOTE — PROGRESS NOTE ADULT - PROBLEM SELECTOR PLAN 4
S/p TAVR on 8/21/2020 with Dr. Lilly.  At least aspirin on d/c ideal,   Will continue to hold Brilinta  TTE done (9/17) report pending
S/p TAVR on 8/21/2020 with Dr. Lilly.
S/p TAVR on 8/21/2020 with Dr. Lilly.
S/p TAVR on 8/21/2020 with Dr. Lilly.  At least aspirin on d/c ideal

## 2020-09-19 ENCOUNTER — TRANSCRIPTION ENCOUNTER (OUTPATIENT)
Age: 85
End: 2020-09-19

## 2020-09-21 ENCOUNTER — APPOINTMENT (OUTPATIENT)
Dept: ELECTROPHYSIOLOGY | Facility: CLINIC | Age: 85
End: 2020-09-21
Payer: MEDICARE

## 2020-09-21 ENCOUNTER — TRANSCRIPTION ENCOUNTER (OUTPATIENT)
Age: 85
End: 2020-09-21

## 2020-09-21 PROCEDURE — G2066: CPT

## 2020-09-21 PROCEDURE — 93298 REM INTERROG DEV EVAL SCRMS: CPT

## 2020-09-23 PROCEDURE — 80053 COMPREHEN METABOLIC PANEL: CPT

## 2020-09-23 PROCEDURE — 88185 FLOWCYTOMETRY/TC ADD-ON: CPT

## 2020-09-23 PROCEDURE — 88341 IMHCHEM/IMCYTCHM EA ADD ANTB: CPT

## 2020-09-23 PROCEDURE — 83036 HEMOGLOBIN GLYCOSYLATED A1C: CPT

## 2020-09-23 PROCEDURE — 87205 SMEAR GRAM STAIN: CPT

## 2020-09-23 PROCEDURE — 88264 CHROMOSOME ANALYSIS 20-25: CPT

## 2020-09-23 PROCEDURE — 86900 BLOOD TYPING SEROLOGIC ABO: CPT

## 2020-09-23 PROCEDURE — 88313 SPECIAL STAINS GROUP 2: CPT

## 2020-09-23 PROCEDURE — 88342 IMHCHEM/IMCYTCHM 1ST ANTB: CPT

## 2020-09-23 PROCEDURE — 82274 ASSAY TEST FOR BLOOD FECAL: CPT

## 2020-09-23 PROCEDURE — 71250 CT THORAX DX C-: CPT

## 2020-09-23 PROCEDURE — 77012 CT SCAN FOR NEEDLE BIOPSY: CPT

## 2020-09-23 PROCEDURE — 84484 ASSAY OF TROPONIN QUANT: CPT

## 2020-09-23 PROCEDURE — 88360 TUMOR IMMUNOHISTOCHEM/MANUAL: CPT

## 2020-09-23 PROCEDURE — 88184 FLOWCYTOMETRY/ TC 1 MARKER: CPT

## 2020-09-23 PROCEDURE — 85610 PROTHROMBIN TIME: CPT

## 2020-09-23 PROCEDURE — 97530 THERAPEUTIC ACTIVITIES: CPT

## 2020-09-23 PROCEDURE — 70450 CT HEAD/BRAIN W/O DYE: CPT

## 2020-09-23 PROCEDURE — 81001 URINALYSIS AUTO W/SCOPE: CPT

## 2020-09-23 PROCEDURE — 85730 THROMBOPLASTIN TIME PARTIAL: CPT

## 2020-09-23 PROCEDURE — 83880 ASSAY OF NATRIURETIC PEPTIDE: CPT

## 2020-09-23 PROCEDURE — 85025 COMPLETE CBC W/AUTO DIFF WBC: CPT

## 2020-09-23 PROCEDURE — 85097 BONE MARROW INTERPRETATION: CPT

## 2020-09-23 PROCEDURE — 82575 CREATININE CLEARANCE TEST: CPT

## 2020-09-23 PROCEDURE — U0003: CPT

## 2020-09-23 PROCEDURE — 86850 RBC ANTIBODY SCREEN: CPT

## 2020-09-23 PROCEDURE — 86901 BLOOD TYPING SEROLOGIC RH(D): CPT

## 2020-09-23 PROCEDURE — 93005 ELECTROCARDIOGRAM TRACING: CPT

## 2020-09-23 PROCEDURE — 88305 TISSUE EXAM BY PATHOLOGIST: CPT

## 2020-09-23 PROCEDURE — 83735 ASSAY OF MAGNESIUM: CPT

## 2020-09-23 PROCEDURE — 71045 X-RAY EXAM CHEST 1 VIEW: CPT

## 2020-09-23 PROCEDURE — 97116 GAIT TRAINING THERAPY: CPT

## 2020-09-23 PROCEDURE — 84443 ASSAY THYROID STIM HORMONE: CPT

## 2020-09-23 PROCEDURE — 74176 CT ABD & PELVIS W/O CONTRAST: CPT

## 2020-09-23 PROCEDURE — 87640 STAPH A DNA AMP PROBE: CPT

## 2020-09-23 PROCEDURE — 85027 COMPLETE CBC AUTOMATED: CPT

## 2020-09-23 PROCEDURE — 93308 TTE F-UP OR LMTD: CPT

## 2020-09-23 PROCEDURE — 78707 K FLOW/FUNCT IMAGE W/O DRUG: CPT

## 2020-09-23 PROCEDURE — 36430 TRANSFUSION BLD/BLD COMPNT: CPT

## 2020-09-23 PROCEDURE — 86923 COMPATIBILITY TEST ELECTRIC: CPT

## 2020-09-23 PROCEDURE — P9016: CPT

## 2020-09-23 PROCEDURE — 86769 SARS-COV-2 COVID-19 ANTIBODY: CPT

## 2020-09-23 PROCEDURE — A9539: CPT

## 2020-09-23 PROCEDURE — 99285 EMERGENCY DEPT VISIT HI MDM: CPT | Mod: 25

## 2020-09-23 PROCEDURE — 88280 CHROMOSOME KARYOTYPE STUDY: CPT

## 2020-09-23 PROCEDURE — 87641 MR-STAPH DNA AMP PROBE: CPT

## 2020-09-23 PROCEDURE — 97163 PT EVAL HIGH COMPLEX 45 MIN: CPT

## 2020-09-23 PROCEDURE — 80048 BASIC METABOLIC PNL TOTAL CA: CPT

## 2020-09-23 PROCEDURE — 82565 ASSAY OF CREATININE: CPT

## 2020-09-23 PROCEDURE — 80069 RENAL FUNCTION PANEL: CPT

## 2020-09-23 PROCEDURE — 36415 COLL VENOUS BLD VENIPUNCTURE: CPT

## 2020-09-23 PROCEDURE — 82550 ASSAY OF CK (CPK): CPT

## 2020-09-23 PROCEDURE — 88237 TISSUE CULTURE BONE MARROW: CPT

## 2020-09-24 ENCOUNTER — APPOINTMENT (OUTPATIENT)
Dept: HEMATOLOGY ONCOLOGY | Facility: CLINIC | Age: 85
End: 2020-09-24
Payer: MEDICARE

## 2020-09-24 ENCOUNTER — RESULT REVIEW (OUTPATIENT)
Age: 85
End: 2020-09-24

## 2020-09-24 VITALS
SYSTOLIC BLOOD PRESSURE: 200 MMHG | OXYGEN SATURATION: 99 % | WEIGHT: 156 LBS | HEIGHT: 66 IN | HEART RATE: 65 BPM | BODY MASS INDEX: 25.07 KG/M2 | DIASTOLIC BLOOD PRESSURE: 72 MMHG

## 2020-09-24 LAB
BASOPHILS # BLD AUTO: 0.1 K/UL — SIGNIFICANT CHANGE UP (ref 0–0.2)
BASOPHILS NFR BLD AUTO: 1.6 % — SIGNIFICANT CHANGE UP (ref 0–2)
EOSINOPHIL # BLD AUTO: 0.5 K/UL — SIGNIFICANT CHANGE UP (ref 0–0.5)
EOSINOPHIL NFR BLD AUTO: 7.2 % — HIGH (ref 0–6)
HCT VFR BLD CALC: 27.7 % — LOW (ref 39–50)
HGB BLD-MCNC: 9 G/DL — LOW (ref 13–17)
LYMPHOCYTES # BLD AUTO: 0.6 K/UL — LOW (ref 1–3.3)
LYMPHOCYTES # BLD AUTO: 10.2 % — LOW (ref 13–44)
MCHC RBC-ENTMCNC: 30.9 PG — SIGNIFICANT CHANGE UP (ref 27–34)
MCHC RBC-ENTMCNC: 32.6 G/DL — SIGNIFICANT CHANGE UP (ref 32–36)
MCV RBC AUTO: 94.7 FL — SIGNIFICANT CHANGE UP (ref 80–100)
MONOCYTES # BLD AUTO: 0.5 K/UL — SIGNIFICANT CHANGE UP (ref 0–0.9)
MONOCYTES NFR BLD AUTO: 7.2 % — SIGNIFICANT CHANGE UP (ref 2–14)
NEUTROPHILS # BLD AUTO: 4.7 K/UL — SIGNIFICANT CHANGE UP (ref 1.8–7.4)
NEUTROPHILS NFR BLD AUTO: 73.9 % — SIGNIFICANT CHANGE UP (ref 43–77)
PLATELET # BLD AUTO: 164 K/UL — SIGNIFICANT CHANGE UP (ref 150–400)
RBC # BLD: 2.93 M/UL — LOW (ref 4.2–5.8)
RBC # FLD: 18 % — HIGH (ref 10.3–14.5)
WBC # BLD: 6.4 K/UL — SIGNIFICANT CHANGE UP (ref 3.8–10.5)
WBC # FLD AUTO: 6.4 K/UL — SIGNIFICANT CHANGE UP (ref 3.8–10.5)

## 2020-09-24 PROCEDURE — 99213 OFFICE O/P EST LOW 20 MIN: CPT

## 2020-09-24 NOTE — PHYSICAL EXAM
[Ambulatory and capable of all self care but unable to carry out any work activities] : Status 2- Ambulatory and capable of all self care but unable to carry out any work activities. Up and about more than 50% of waking hours [Normal] : affect appropriate [de-identified] : Pale

## 2020-09-24 NOTE — HISTORY OF PRESENT ILLNESS
[de-identified] : \par Mr. King is an 87 yo WM with a long history of renal, disease, currently Stage 5, who had been treated conservatively, on procrit/aranesp at [facility], but recently initiated dialysis. Following a recent TAVR for worsening AS on August 21 he has required several transfusions to maintain hemoglobin of 9 g. No overt bleeding sites have been identified.\par  \par \par \par \par Interval History: Has been receiving retacrit at dialysis.\par Now moving back to Aranesp 300 mcg every 2 weeks. \par \par

## 2020-09-24 NOTE — REVIEW OF SYSTEMS
[Fever] : no fever [Fatigue] : fatigue [SOB on Exertion] : shortness of breath during exertion [Negative] : Gastrointestinal

## 2020-09-25 ENCOUNTER — OUTPATIENT (OUTPATIENT)
Dept: OUTPATIENT SERVICES | Facility: HOSPITAL | Age: 85
LOS: 1 days | Discharge: HOME | End: 2020-09-25

## 2020-09-25 DIAGNOSIS — Z98.62 PERIPHERAL VASCULAR ANGIOPLASTY STATUS: Chronic | ICD-10-CM

## 2020-09-25 DIAGNOSIS — Z98.890 OTHER SPECIFIED POSTPROCEDURAL STATES: Chronic | ICD-10-CM

## 2020-09-25 DIAGNOSIS — Z95.2 PRESENCE OF PROSTHETIC HEART VALVE: Chronic | ICD-10-CM

## 2020-09-25 DIAGNOSIS — D63.1 ANEMIA IN CHRONIC KIDNEY DISEASE: ICD-10-CM

## 2020-09-25 DIAGNOSIS — I77.0 ARTERIOVENOUS FISTULA, ACQUIRED: Chronic | ICD-10-CM

## 2020-09-25 DIAGNOSIS — N18.4 CHRONIC KIDNEY DISEASE, STAGE 4 (SEVERE): ICD-10-CM

## 2020-09-25 DIAGNOSIS — N18.5 CHRONIC KIDNEY DISEASE, STAGE 5: ICD-10-CM

## 2020-09-29 LAB — CHROM ANALY OVERALL INTERP SPEC-IMP: SIGNIFICANT CHANGE UP

## 2020-10-01 ENCOUNTER — RESULT REVIEW (OUTPATIENT)
Age: 85
End: 2020-10-01

## 2020-10-01 ENCOUNTER — APPOINTMENT (OUTPATIENT)
Age: 85
End: 2020-10-01

## 2020-10-01 ENCOUNTER — APPOINTMENT (OUTPATIENT)
Dept: HEMATOLOGY ONCOLOGY | Facility: CLINIC | Age: 85
End: 2020-10-01

## 2020-10-01 LAB
BASOPHILS # BLD AUTO: 0.1 K/UL — SIGNIFICANT CHANGE UP (ref 0–0.2)
BASOPHILS NFR BLD AUTO: 2.1 % — HIGH (ref 0–2)
EOSINOPHIL # BLD AUTO: 0.4 K/UL — SIGNIFICANT CHANGE UP (ref 0–0.5)
EOSINOPHIL NFR BLD AUTO: 6.5 % — HIGH (ref 0–6)
HCT VFR BLD CALC: 31.6 % — LOW (ref 39–50)
HGB BLD-MCNC: 10.1 G/DL — LOW (ref 13–17)
LYMPHOCYTES # BLD AUTO: 0.6 K/UL — LOW (ref 1–3.3)
LYMPHOCYTES # BLD AUTO: 8.8 % — LOW (ref 13–44)
MCHC RBC-ENTMCNC: 29.9 PG — SIGNIFICANT CHANGE UP (ref 27–34)
MCHC RBC-ENTMCNC: 31.9 G/DL — LOW (ref 32–36)
MCV RBC AUTO: 93.9 FL — SIGNIFICANT CHANGE UP (ref 80–100)
MONOCYTES # BLD AUTO: 0.4 K/UL — SIGNIFICANT CHANGE UP (ref 0–0.9)
MONOCYTES NFR BLD AUTO: 6.1 % — SIGNIFICANT CHANGE UP (ref 2–14)
NEUTROPHILS # BLD AUTO: 5.2 K/UL — SIGNIFICANT CHANGE UP (ref 1.8–7.4)
NEUTROPHILS NFR BLD AUTO: 76.5 % — SIGNIFICANT CHANGE UP (ref 43–77)
PLATELET # BLD AUTO: 150 K/UL — SIGNIFICANT CHANGE UP (ref 150–400)
RBC # BLD: 3.37 M/UL — LOW (ref 4.2–5.8)
RBC # FLD: 17.2 % — HIGH (ref 10.3–14.5)
WBC # BLD: 6.8 K/UL — SIGNIFICANT CHANGE UP (ref 3.8–10.5)
WBC # FLD AUTO: 6.8 K/UL — SIGNIFICANT CHANGE UP (ref 3.8–10.5)

## 2020-10-08 ENCOUNTER — APPOINTMENT (OUTPATIENT)
Dept: HEMATOLOGY ONCOLOGY | Facility: CLINIC | Age: 85
End: 2020-10-08

## 2020-10-08 ENCOUNTER — RESULT REVIEW (OUTPATIENT)
Age: 85
End: 2020-10-08

## 2020-10-08 LAB
BASOPHILS # BLD AUTO: 0.1 K/UL — SIGNIFICANT CHANGE UP (ref 0–0.2)
BASOPHILS NFR BLD AUTO: 2.1 % — HIGH (ref 0–2)
EOSINOPHIL # BLD AUTO: 0.3 K/UL — SIGNIFICANT CHANGE UP (ref 0–0.5)
EOSINOPHIL NFR BLD AUTO: 4.4 % — SIGNIFICANT CHANGE UP (ref 0–6)
HCT VFR BLD CALC: 32.8 % — LOW (ref 39–50)
HGB BLD-MCNC: 10.3 G/DL — LOW (ref 13–17)
LYMPHOCYTES # BLD AUTO: 0.6 K/UL — LOW (ref 1–3.3)
LYMPHOCYTES # BLD AUTO: 11.1 % — LOW (ref 13–44)
MCHC RBC-ENTMCNC: 29.9 PG — SIGNIFICANT CHANGE UP (ref 27–34)
MCHC RBC-ENTMCNC: 31.4 G/DL — LOW (ref 32–36)
MCV RBC AUTO: 95.1 FL — SIGNIFICANT CHANGE UP (ref 80–100)
MONOCYTES # BLD AUTO: 0.4 K/UL — SIGNIFICANT CHANGE UP (ref 0–0.9)
MONOCYTES NFR BLD AUTO: 6.8 % — SIGNIFICANT CHANGE UP (ref 2–14)
NEUTROPHILS # BLD AUTO: 4.4 K/UL — SIGNIFICANT CHANGE UP (ref 1.8–7.4)
NEUTROPHILS NFR BLD AUTO: 75.6 % — SIGNIFICANT CHANGE UP (ref 43–77)
PLATELET # BLD AUTO: 164 K/UL — SIGNIFICANT CHANGE UP (ref 150–400)
RBC # BLD: 3.45 M/UL — LOW (ref 4.2–5.8)
RBC # FLD: 16.3 % — HIGH (ref 10.3–14.5)
WBC # BLD: 5.8 K/UL — SIGNIFICANT CHANGE UP (ref 3.8–10.5)
WBC # FLD AUTO: 5.8 K/UL — SIGNIFICANT CHANGE UP (ref 3.8–10.5)

## 2020-10-09 ENCOUNTER — APPOINTMENT (OUTPATIENT)
Dept: NEPHROLOGY | Facility: CLINIC | Age: 85
End: 2020-10-09

## 2020-10-15 ENCOUNTER — RESULT REVIEW (OUTPATIENT)
Age: 85
End: 2020-10-15

## 2020-10-15 ENCOUNTER — APPOINTMENT (OUTPATIENT)
Age: 85
End: 2020-10-15

## 2020-10-15 ENCOUNTER — APPOINTMENT (OUTPATIENT)
Dept: CARDIOTHORACIC SURGERY | Facility: CLINIC | Age: 85
End: 2020-10-15

## 2020-10-15 ENCOUNTER — APPOINTMENT (OUTPATIENT)
Dept: HEMATOLOGY ONCOLOGY | Facility: CLINIC | Age: 85
End: 2020-10-15
Payer: MEDICARE

## 2020-10-15 VITALS
HEIGHT: 66 IN | HEART RATE: 66 BPM | DIASTOLIC BLOOD PRESSURE: 68 MMHG | OXYGEN SATURATION: 95 % | SYSTOLIC BLOOD PRESSURE: 132 MMHG

## 2020-10-15 LAB
BASOPHILS # BLD AUTO: 0.1 K/UL — SIGNIFICANT CHANGE UP (ref 0–0.2)
BASOPHILS NFR BLD AUTO: 1.7 % — SIGNIFICANT CHANGE UP (ref 0–2)
EOSINOPHIL # BLD AUTO: 0.2 K/UL — SIGNIFICANT CHANGE UP (ref 0–0.5)
EOSINOPHIL NFR BLD AUTO: 3.9 % — SIGNIFICANT CHANGE UP (ref 0–6)
HCT VFR BLD CALC: 33 % — LOW (ref 39–50)
HGB BLD-MCNC: 10.6 G/DL — LOW (ref 13–17)
LYMPHOCYTES # BLD AUTO: 0.4 K/UL — LOW (ref 1–3.3)
LYMPHOCYTES # BLD AUTO: 5.9 % — LOW (ref 13–44)
MCHC RBC-ENTMCNC: 29 PG — SIGNIFICANT CHANGE UP (ref 27–34)
MCHC RBC-ENTMCNC: 32 G/DL — SIGNIFICANT CHANGE UP (ref 32–36)
MCV RBC AUTO: 90.8 FL — SIGNIFICANT CHANGE UP (ref 80–100)
MONOCYTES # BLD AUTO: 0.4 K/UL — SIGNIFICANT CHANGE UP (ref 0–0.9)
MONOCYTES NFR BLD AUTO: 6.9 % — SIGNIFICANT CHANGE UP (ref 2–14)
NEUTROPHILS # BLD AUTO: 5 K/UL — SIGNIFICANT CHANGE UP (ref 1.8–7.4)
NEUTROPHILS NFR BLD AUTO: 81.6 % — HIGH (ref 43–77)
PLATELET # BLD AUTO: 170 K/UL — SIGNIFICANT CHANGE UP (ref 150–400)
RBC # BLD: 3.63 M/UL — LOW (ref 4.2–5.8)
RBC # FLD: 15.8 % — HIGH (ref 10.3–14.5)
WBC # BLD: 6.1 K/UL — SIGNIFICANT CHANGE UP (ref 3.8–10.5)
WBC # FLD AUTO: 6.1 K/UL — SIGNIFICANT CHANGE UP (ref 3.8–10.5)

## 2020-10-15 PROCEDURE — 99213 OFFICE O/P EST LOW 20 MIN: CPT

## 2020-10-15 NOTE — HISTORY OF PRESENT ILLNESS
[de-identified] : Mr. King is an 87 yo WM with a long history of renal, disease, currently Stage 5, who had been treated conservatively, on procrit/aranesp at [facility], but recently initiated dialysis. Following a recent TAVR for worsening AS on August 21 he has required several transfusions to maintain hemoglobin of 9 g. No overt bleeding sites have been identified.\par  \par \par \par \par \par \par  [de-identified] : Patient presents to follow up c/o neuropathy LE b/l for the past 2 months. States nephrologist has tried him on Gabapentin and Cymbalta neither provided symptom relief. Patient states otherwise he is doing well. Denies SOB, blood in stool.

## 2020-10-15 NOTE — REASON FOR VISIT
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

## 2020-10-15 NOTE — REVIEW OF SYSTEMS
[Fatigue] : fatigue [SOB on Exertion] : shortness of breath during exertion [Negative] : Cardiovascular [FreeTextEntry9] : Neuropathy LE b/l [Fever] : no fever

## 2020-10-15 NOTE — PHYSICAL EXAM
[Ambulatory and capable of all self care but unable to carry out any work activities] : Status 2- Ambulatory and capable of all self care but unable to carry out any work activities. Up and about more than 50% of waking hours [Normal] : affect appropriate [de-identified] : Pale [de-identified] : Murmur noted on exam, RRR +S1S2

## 2020-10-15 NOTE — ASSESSMENT
[FreeTextEntry1] : Anemia of chronic renal disease. \par \par - Hg - 10.6 today. Continue weekly CBCs\par - Continue Aranesp Q 2 weeks\par -Advised patient to follow up with Neurology for further evaluation of Neuropathy. Suggested maybe increasing Cymbalta to 60 mg as long Nephrology agrees to dose increase\par -F/U with

## 2020-10-20 ENCOUNTER — APPOINTMENT (OUTPATIENT)
Dept: VASCULAR SURGERY | Facility: CLINIC | Age: 85
End: 2020-10-20
Payer: MEDICARE

## 2020-10-20 VITALS
HEIGHT: 65 IN | TEMPERATURE: 97.7 F | SYSTOLIC BLOOD PRESSURE: 122 MMHG | HEART RATE: 77 BPM | OXYGEN SATURATION: 99 % | WEIGHT: 159 LBS | DIASTOLIC BLOOD PRESSURE: 54 MMHG | BODY MASS INDEX: 26.49 KG/M2

## 2020-10-20 DIAGNOSIS — M79.604 PAIN IN RIGHT LEG: ICD-10-CM

## 2020-10-20 PROCEDURE — 99213 OFFICE O/P EST LOW 20 MIN: CPT

## 2020-10-20 PROCEDURE — 99072 ADDL SUPL MATRL&STAF TM PHE: CPT

## 2020-10-21 ENCOUNTER — INPATIENT (INPATIENT)
Facility: HOSPITAL | Age: 85
LOS: 9 days | Discharge: HOME | DRG: 228 | End: 2020-10-31
Attending: SURGERY | Admitting: SURGERY
Payer: MEDICARE

## 2020-10-21 VITALS
WEIGHT: 210.1 LBS | DIASTOLIC BLOOD PRESSURE: 55 MMHG | RESPIRATION RATE: 18 BRPM | SYSTOLIC BLOOD PRESSURE: 140 MMHG | TEMPERATURE: 99 F | HEIGHT: 65 IN | OXYGEN SATURATION: 98 % | HEART RATE: 75 BPM

## 2020-10-21 DIAGNOSIS — Z98.890 OTHER SPECIFIED POSTPROCEDURAL STATES: Chronic | ICD-10-CM

## 2020-10-21 DIAGNOSIS — I77.0 ARTERIOVENOUS FISTULA, ACQUIRED: Chronic | ICD-10-CM

## 2020-10-21 DIAGNOSIS — Z95.2 PRESENCE OF PROSTHETIC HEART VALVE: Chronic | ICD-10-CM

## 2020-10-21 DIAGNOSIS — Z98.62 PERIPHERAL VASCULAR ANGIOPLASTY STATUS: Chronic | ICD-10-CM

## 2020-10-21 DIAGNOSIS — M79.604 PAIN IN RIGHT LEG: ICD-10-CM

## 2020-10-21 LAB
ALBUMIN SERPL ELPH-MCNC: 4 G/DL — SIGNIFICANT CHANGE UP (ref 3.3–5.2)
ALP SERPL-CCNC: 132 U/L — HIGH (ref 40–120)
ALT FLD-CCNC: 18 U/L — SIGNIFICANT CHANGE UP
ANION GAP SERPL CALC-SCNC: 15 MMOL/L — SIGNIFICANT CHANGE UP (ref 5–17)
APTT BLD: 33.1 SEC — SIGNIFICANT CHANGE UP (ref 27.5–35.5)
AST SERPL-CCNC: 19 U/L — SIGNIFICANT CHANGE UP
BASOPHILS # BLD AUTO: 0.09 K/UL — SIGNIFICANT CHANGE UP (ref 0–0.2)
BASOPHILS NFR BLD AUTO: 1.5 % — SIGNIFICANT CHANGE UP (ref 0–2)
BILIRUB SERPL-MCNC: 0.4 MG/DL — SIGNIFICANT CHANGE UP (ref 0.4–2)
BLD GP AB SCN SERPL QL: SIGNIFICANT CHANGE UP
BUN SERPL-MCNC: 46 MG/DL — HIGH (ref 8–20)
CALCIUM SERPL-MCNC: 9.3 MG/DL — SIGNIFICANT CHANGE UP (ref 8.6–10.2)
CHLORIDE SERPL-SCNC: 94 MMOL/L — LOW (ref 98–107)
CO2 SERPL-SCNC: 27 MMOL/L — SIGNIFICANT CHANGE UP (ref 22–29)
CREAT SERPL-MCNC: 4.76 MG/DL — HIGH (ref 0.5–1.3)
EOSINOPHIL # BLD AUTO: 0.17 K/UL — SIGNIFICANT CHANGE UP (ref 0–0.5)
EOSINOPHIL NFR BLD AUTO: 2.9 % — SIGNIFICANT CHANGE UP (ref 0–6)
GLUCOSE SERPL-MCNC: 163 MG/DL — HIGH (ref 70–99)
HCT VFR BLD CALC: 35.4 % — LOW (ref 39–50)
HGB BLD-MCNC: 10.7 G/DL — LOW (ref 13–17)
IMM GRANULOCYTES NFR BLD AUTO: 0.5 % — SIGNIFICANT CHANGE UP (ref 0–1.5)
INR BLD: 1.06 RATIO — SIGNIFICANT CHANGE UP (ref 0.88–1.16)
LYMPHOCYTES # BLD AUTO: 0.59 K/UL — LOW (ref 1–3.3)
LYMPHOCYTES # BLD AUTO: 10.1 % — LOW (ref 13–44)
MCHC RBC-ENTMCNC: 28.6 PG — SIGNIFICANT CHANGE UP (ref 27–34)
MCHC RBC-ENTMCNC: 30.2 GM/DL — LOW (ref 32–36)
MCV RBC AUTO: 94.7 FL — SIGNIFICANT CHANGE UP (ref 80–100)
MONOCYTES # BLD AUTO: 0.67 K/UL — SIGNIFICANT CHANGE UP (ref 0–0.9)
MONOCYTES NFR BLD AUTO: 11.5 % — SIGNIFICANT CHANGE UP (ref 2–14)
NEUTROPHILS # BLD AUTO: 4.27 K/UL — SIGNIFICANT CHANGE UP (ref 1.8–7.4)
NEUTROPHILS NFR BLD AUTO: 73.5 % — SIGNIFICANT CHANGE UP (ref 43–77)
PLATELET # BLD AUTO: 197 K/UL — SIGNIFICANT CHANGE UP (ref 150–400)
POTASSIUM SERPL-MCNC: 4.3 MMOL/L — SIGNIFICANT CHANGE UP (ref 3.5–5.3)
POTASSIUM SERPL-SCNC: 4.3 MMOL/L — SIGNIFICANT CHANGE UP (ref 3.5–5.3)
PROT SERPL-MCNC: 6.5 G/DL — LOW (ref 6.6–8.7)
PROTHROM AB SERPL-ACNC: 12.3 SEC — SIGNIFICANT CHANGE UP (ref 10.6–13.6)
RBC # BLD: 3.74 M/UL — LOW (ref 4.2–5.8)
RBC # FLD: 16.1 % — HIGH (ref 10.3–14.5)
SARS-COV-2 RNA SPEC QL NAA+PROBE: SIGNIFICANT CHANGE UP
SODIUM SERPL-SCNC: 136 MMOL/L — SIGNIFICANT CHANGE UP (ref 135–145)
WBC # BLD: 5.82 K/UL — SIGNIFICANT CHANGE UP (ref 3.8–10.5)
WBC # FLD AUTO: 5.82 K/UL — SIGNIFICANT CHANGE UP (ref 3.8–10.5)

## 2020-10-21 PROCEDURE — 71045 X-RAY EXAM CHEST 1 VIEW: CPT | Mod: 26

## 2020-10-21 PROCEDURE — 99285 EMERGENCY DEPT VISIT HI MDM: CPT

## 2020-10-21 PROCEDURE — 93010 ELECTROCARDIOGRAM REPORT: CPT

## 2020-10-21 RX ORDER — DEXTROSE 50 % IN WATER 50 %
25 SYRINGE (ML) INTRAVENOUS ONCE
Refills: 0 | Status: DISCONTINUED | OUTPATIENT
Start: 2020-10-21 | End: 2020-10-31

## 2020-10-21 RX ORDER — ONDANSETRON 8 MG/1
4 TABLET, FILM COATED ORAL EVERY 6 HOURS
Refills: 0 | Status: DISCONTINUED | OUTPATIENT
Start: 2020-10-21 | End: 2020-10-31

## 2020-10-21 RX ORDER — HYDRALAZINE HCL 50 MG
50 TABLET ORAL
Refills: 0 | Status: DISCONTINUED | OUTPATIENT
Start: 2020-10-21 | End: 2020-10-22

## 2020-10-21 RX ORDER — HEPARIN SODIUM 5000 [USP'U]/ML
5000 INJECTION INTRAVENOUS; SUBCUTANEOUS EVERY 8 HOURS
Refills: 0 | Status: DISCONTINUED | OUTPATIENT
Start: 2020-10-21 | End: 2020-10-23

## 2020-10-21 RX ORDER — ACETAMINOPHEN 500 MG
650 TABLET ORAL EVERY 6 HOURS
Refills: 0 | Status: DISCONTINUED | OUTPATIENT
Start: 2020-10-21 | End: 2020-10-31

## 2020-10-21 RX ORDER — ISOSORBIDE MONONITRATE 60 MG/1
30 TABLET, EXTENDED RELEASE ORAL DAILY
Refills: 0 | Status: DISCONTINUED | OUTPATIENT
Start: 2020-10-21 | End: 2020-10-31

## 2020-10-21 RX ORDER — SODIUM CHLORIDE 9 MG/ML
1000 INJECTION, SOLUTION INTRAVENOUS
Refills: 0 | Status: DISCONTINUED | OUTPATIENT
Start: 2020-10-21 | End: 2020-10-31

## 2020-10-21 RX ORDER — NIFEDIPINE 30 MG
90 TABLET, EXTENDED RELEASE 24 HR ORAL DAILY
Refills: 0 | Status: DISCONTINUED | OUTPATIENT
Start: 2020-10-21 | End: 2020-10-21

## 2020-10-21 RX ORDER — NIFEDIPINE 30 MG
60 TABLET, EXTENDED RELEASE 24 HR ORAL AT BEDTIME
Refills: 0 | Status: DISCONTINUED | OUTPATIENT
Start: 2020-10-21 | End: 2020-10-31

## 2020-10-21 RX ORDER — GLUCAGON INJECTION, SOLUTION 0.5 MG/.1ML
1 INJECTION, SOLUTION SUBCUTANEOUS ONCE
Refills: 0 | Status: DISCONTINUED | OUTPATIENT
Start: 2020-10-21 | End: 2020-10-31

## 2020-10-21 RX ORDER — INSULIN LISPRO 100/ML
VIAL (ML) SUBCUTANEOUS
Refills: 0 | Status: DISCONTINUED | OUTPATIENT
Start: 2020-10-21 | End: 2020-10-31

## 2020-10-21 RX ORDER — DOXAZOSIN MESYLATE 4 MG
1 TABLET ORAL AT BEDTIME
Refills: 0 | Status: DISCONTINUED | OUTPATIENT
Start: 2020-10-21 | End: 2020-10-31

## 2020-10-21 RX ORDER — DEXTROSE 50 % IN WATER 50 %
12.5 SYRINGE (ML) INTRAVENOUS ONCE
Refills: 0 | Status: DISCONTINUED | OUTPATIENT
Start: 2020-10-21 | End: 2020-10-31

## 2020-10-21 RX ORDER — DEXTROSE 50 % IN WATER 50 %
15 SYRINGE (ML) INTRAVENOUS ONCE
Refills: 0 | Status: DISCONTINUED | OUTPATIENT
Start: 2020-10-21 | End: 2020-10-31

## 2020-10-21 RX ORDER — ASPIRIN/CALCIUM CARB/MAGNESIUM 324 MG
81 TABLET ORAL DAILY
Refills: 0 | Status: DISCONTINUED | OUTPATIENT
Start: 2020-10-21 | End: 2020-10-23

## 2020-10-21 RX ORDER — SENNA PLUS 8.6 MG/1
2 TABLET ORAL AT BEDTIME
Refills: 0 | Status: DISCONTINUED | OUTPATIENT
Start: 2020-10-21 | End: 2020-10-31

## 2020-10-21 RX ADMIN — Medication 60 MILLIGRAM(S): at 21:58

## 2020-10-21 RX ADMIN — Medication 0.1 MILLIGRAM(S): at 21:58

## 2020-10-21 RX ADMIN — SENNA PLUS 2 TABLET(S): 8.6 TABLET ORAL at 21:58

## 2020-10-21 RX ADMIN — Medication 1 MILLIGRAM(S): at 21:58

## 2020-10-21 RX ADMIN — HEPARIN SODIUM 5000 UNIT(S): 5000 INJECTION INTRAVENOUS; SUBCUTANEOUS at 21:57

## 2020-10-21 NOTE — ED PROVIDER NOTE - NS ED ROS FT
ROS: no CP/SOB. no cough. no fever. no n/v/d/c. no abd pain. +rash. no bleeding. no urinary complaints. no weakness. no vision changes. no HA. no neck/back pain. +extremity pain No change in mental status.

## 2020-10-21 NOTE — ED ADULT NURSE NOTE - OBJECTIVE STATEMENT
Pt sent in from PMD for right foot pain.  Pt is to get angiogram tomorrow.  Redness swelling noted to right foot, toes.

## 2020-10-21 NOTE — H&P ADULT - NSICDXPASTMEDICALHX_GEN_ALL_CORE_FT
PAST MEDICAL HISTORY:  Anemia     Aortic stenosis - s/p valve replacement    Arrhythmia     AV block, 1st degree     CAD (coronary artery disease)     DM (diabetes mellitus) - resolved    RICHARDS (dyspnea on exertion)     ESRD on dialysis HD on Mondays and Fridays    GI bleeding due to ulcerated polyps and colonic AVMs    HTN (hypertension)     Risk factors for obstructive sleep apnea     VT (ventricular tachycardia)

## 2020-10-21 NOTE — H&P ADULT - HISTORY OF PRESENT ILLNESS
85 yo M w/ PAD s/p RLE angiogram with angioplasty in june 2020 presents w/ persistent RLE pain. describes pain as shooting pain in his right foot. no alleviating factors. exacerbated with prolonged standing or walking. Patient had angiogram with CO2 in june which did not significantly helping. denies other associate symptoms including f/c/sob/n/v/ha/chest pain.

## 2020-10-21 NOTE — H&P ADULT - ASSESSMENT
85 yo M w/ chronic RLE pain 2/2 PAD    - admit to vascular surgery under Dr. Sorenson  - Plan for angiography with possible intervention 10/22  - NPO at [facility]  - Pre op labs  - Dvt ppx

## 2020-10-21 NOTE — H&P ADULT - NSHPPHYSICALEXAM_GEN_ALL_CORE
PHYSICAL EXAM:  GENERAL: NAD, well-developed  HEAD:  Atraumatic, Normocephalic  EYES: EOMI, PERRLA, conjunctiva and sclera clear  NECK: Supple, No JVD  CHEST/LUNG: Clear to auscultation bilaterally; No wheeze  HEART: Regular rate and rhythm; No murmurs, rubs, or gallops  ABDOMEN: Soft, Nontender, Nondistended; Bowel sounds present  EXTREMITIES:  2+ Peripheral Pulses, No clubbing, cyanosis, or edema  PSYCH: AAOx3  NEUROLOGY: non-focal  SKIN: No rashes or lesions PHYSICAL EXAM:  GENERAL: NAD, well-developed  HEAD:  Atraumatic, Normocephalic  EYES: EOMI, PERRLA, conjunctiva and sclera clear  NECK: Supple, No JVD  CHEST/LUNG: Clear to auscultation bilaterally; No wheeze  HEART: Regular rate and rhythm; No murmurs, rubs, or gallops  ABDOMEN: Soft, Nontender, Nondistended; Bowel sounds present  EXTREMITIES:  LUE AVF palpable thrill. LLE DP/PT dopplerable signals. RLE AT/PT dopplerable signal. RLE wwp, tender to palpation, erythema from distal digits to mid foot. R heel cracked and tender to palpation, non erythemytous.   PSYCH: AAOx3  NEUROLOGY: non-focal  SKIN: No rashes or lesions

## 2020-10-21 NOTE — ED ADULT NURSE NOTE - NSIMPLEMENTINTERV_GEN_ALL_ED
Implemented All Fall with Harm Risk Interventions:  [place] to call system. Call bell, personal items and telephone within reach. Instruct patient to call for assistance. Room bathroom lighting operational. Non-slip footwear when patient is off stretcher. Physically safe environment: no spills, clutter or unnecessary equipment. Stretcher in lowest position, wheels locked, appropriate side rails in place. Provide visual cue, wrist band, yellow gown, etc. Monitor gait and stability. Monitor for mental status changes and reorient to person, place, and time. Review medications for side effects contributing to fall risk. Reinforce activity limits and safety measures with patient and family. Provide visual clues: red socks.

## 2020-10-21 NOTE — ED PROVIDER NOTE - PSH
A-V fistula  left arm 5/2017  H/O angioplasty  2013,  no  intervention  H/O carotid endarterectomy  Right  H/O circumcision  at  age  65  H/O left knee surgery    S/P TAVR (transcatheter aortic valve replacement)

## 2020-10-21 NOTE — ED PROVIDER NOTE - OBJECTIVE STATEMENT
87yo M with ESRD (MF- HD) vascular disease, had LE angio in august, since having worseing pain in both feet but worse rt LE worse at night and with walking, improves with hanging off the bed. pain 10/10 electric at times. worse w/ touch. no fever/chills. h/o DM- but no longer on medication in 3 years.    luís- bbee thomas  [facility]

## 2020-10-21 NOTE — H&P ADULT - NSICDXPASTSURGICALHX_GEN_ALL_CORE_FT
PAST SURGICAL HISTORY:  A-V fistula left arm 5/2017    H/O angioplasty 2013,  no  intervention    H/O carotid endarterectomy Right    H/O circumcision at  age  65    H/O left knee surgery     S/P TAVR (transcatheter aortic valve replacement)

## 2020-10-21 NOTE — ED PROVIDER NOTE - PHYSICAL EXAMINATION
Gen: NAD, AOx3, chronically ill appearing  Head: NCAT  HEENT: EOMI, oral mucosa moist, normal conjunctiva, neck supple  Lung: CTAB, no respiratory distress  CV: rrr, no murmur  Abd: soft, NT, distended  MSK: trace b/l LE pedal edema, +1 PT b/l, unable to appreciate DP b/l, foot warm with normal cap refill, +ttp left toes 1-5 with erythema/blancahble no increased warmth/drainage  Neuro: No focal neurologic deficits  Skin: see msk  Psych: normal affect

## 2020-10-21 NOTE — H&P ADULT - NSHPLABSRESULTS_GEN_ALL_CORE
LABS:  cret                        10.7   5.82  )-----------( 197      ( 21 Oct 2020 17:33 )             35.4     10-21    136  |  94<L>  |  46.0<H>  ----------------------------<  163<H>  4.3   |  27.0  |  4.76<H>    Ca    9.3      21 Oct 2020 17:33    TPro  6.5<L>  /  Alb  4.0  /  TBili  0.4  /  DBili  x   /  AST  19  /  ALT  18  /  AlkPhos  132<H>  10-21    PT/INR - ( 21 Oct 2020 17:33 )   PT: 12.3 sec;   INR: 1.06 ratio         PTT - ( 21 Oct 2020 17:33 )  PTT:33.1 sec

## 2020-10-21 NOTE — ED ADULT NURSE NOTE - PMH
Anemia    Aortic stenosis  - s/p valve replacement  Arrhythmia    AV block, 1st degree    CAD (coronary artery disease)    DM (diabetes mellitus)  - resolved  RICHARDS (dyspnea on exertion)    ESRD on dialysis  HD on Mondays and Fridays  GI bleeding  due to ulcerated polyps and colonic AVMs  HTN (hypertension)    Risk factors for obstructive sleep apnea    VT (ventricular tachycardia)

## 2020-10-21 NOTE — ED PROVIDER NOTE - CLINICAL SUMMARY MEDICAL DECISION MAKING FREE TEXT BOX
patient with known vascular disease, worsening claudication sx, seen by dr bebe navarro, concerned for worsening disease and need for intervention. labs. consult. covid. ekg. cxr tba

## 2020-10-21 NOTE — ED PROVIDER NOTE - PMH
General Adult HPI





- General


Chief complaint: Chest Pain


Stated complaint: Chest Pain


Time Seen by Provider: 03/28/20 14:21


Source: patient, family, EMS


Mode of arrival: EMS


Limitations: no limitations





- History of Present Illness


Initial comments: 





Patient presents the ED by ambulance for evaluation with his wife at bedside.  

Patient states that he developed central chest pain about 1.5 hours ago after 

lifting up a 20 pound cat.  Patient has a history of coronary artery disease.  

Patient was given aspirin 325 mg by mouth and sublingual nitroglycerin 1 by 

EMS.  Patient reports that his pain improved from a 7/10 to 4/10 with the 

nitroglycerin that he was given.  Patient also admits to being somewhat dyspneic

since his pain began.  Patient's wife states the patient has a chronic and 

unchanged cough.  Patient denies trauma or injury, fever or chills, headache, 

focal neuro deficit, radiation of his pain, neck/arm/jaw/back pain, pleuritic 

pain, hemoptysis, palpitations, dizziness, nausea/vomiting/diaphoresis, 

abdominal pain, decreased urinary output or urinary symptoms, leg or calf 

swelling or pain, or any other symptoms or complaints.  Patient has mild 

dementia and aphasia at baseline per wife.





- Related Data


                                Home Medications











 Medication  Instructions  Recorded  Confirmed


 


Atenolol [Tenormin] 25 mg PO QAM 09/30/14 03/28/20


 


Famotidine 40 mg PO BID 09/30/14 03/28/20


 


Lisinopril [Zestril] 2.5 mg PO HS 09/30/14 03/28/20


 


metFORMIN HCL [Glucophage] 500 mg PO BID-W/MEALS 09/30/14 03/28/20


 


Aspirin 81 mg PO QAM 03/25/16 03/28/20


 


Albuterol Inhaler [Ventolin Hfa 1 puff INHALATION RT-Q6H PRN 07/18/16 03/28/20





Inhaler]   


 


Cyanocobalamin [Vitamin B-12] 250 mcg PO DAILY 07/18/16 03/28/20


 


Lovastatin [Mevacor] 40 mg PO HS 09/19/16 03/28/20


 


Cholecalciferol [Vitamin D3 (25 1,000 unit PO DAILY 12/14/17 03/28/20





Mcg = 1000 Iu)]   


 


Meloxicam 15 mg PO DAILY 12/14/17 03/28/20


 


Montelukast [Singulair] 10 mg PO HS 12/14/17 03/28/20


 


Multivitamins, Thera [Multivitamin 1 tab PO DAILY 12/14/17 03/28/20





(formulary)]   


 


Donepezil [Aricept] 10 mg PO HS 07/22/18 03/28/20


 


Memantine [Namenda] 10 mg PO BID 07/22/18 03/28/20


 


traZODone HCL 50 mg PO HS 06/08/19 03/28/20


 


Sertraline [Zoloft] 75 mg PO DAILY 01/22/20 03/28/20


 


Benralizumab [Fasenra] 30 mg SQ Q28D 03/28/20 03/28/20


 


Budesonide [Pulmicort] 0.5 mg INHALATION RT-BID 03/28/20 03/28/20











                                    Allergies











Allergy/AdvReac Type Severity Reaction Status Date / Time


 


No Known Allergies Allergy   Verified 03/28/20 15:38














Review of Systems


ROS Statement: 


Those systems with pertinent positive or pertinent negative responses have been 

documented in the HPI.





ROS Other: All systems not noted in ROS Statement are negative.





Past Medical History


Past Medical History: Asthma, Coronary Artery Disease (CAD), COPD, Dementia, 

Diabetes Mellitus, Deep Vein Thrombosis (DVT), GERD/Reflux, Hyperlipidemia, 

Hypertension, Memory Impairment, Myocardial Infarction (MI), Osteoarthritis 

(OA), Pneumonia, Pulmonary Embolus (PE), Skin Disorder, Sleep Apnea/CPAP/BIPAP


Additional Past Medical History / Comment(s): Migraines.  KATHY PE and LT DVT 

4/2016.  Bruises easily.  Hx Kidney Stones.  Dementia.  USES CPAP.


Last Myocardial Infarction Date:: 1999 AND 2000


History of Any Multi-Drug Resistant Organisms: None Reported


Past Surgical History: Heart Catheterization With Stent


Additional Past Surgical History / Comment(s): 7 STENTS.  LT KNEE REPLACEMENT.  

LT ACHILLES TENDON REPAIR.   bilat CATARACT 7/19/16.


Past Anesthesia/Blood Transfusion Reactions: Previous Problems w/ Anesthesia


Additional Past Anesthesia/Blood Transfusion Reaction / Comment(s): DIFFICULTY 

WAKING UP.


Date of Last Stent Placement:: 2013


Past Psychological History: Anxiety, Depression, Panic Disorder


Smoking Status: Former smoker


Past Alcohol Use History: None Reported


Past Drug Use History: None Reported





- Past Family History


  ** Mother


Family Medical History: Coronary Artery Disease (CAD)


Additional Family Medical History / Comment(s): QUAD BYPASS





  ** Brother(s)


Family Medical History: Coronary Artery Disease (CAD)





  ** Sister(s)


Family Medical History: No Reported History





  ** Daughter(s)


Family Medical History: No Reported History





  ** Son(s)


Family Medical History: No Reported History





General Exam


Limitations: no limitations


General appearance: alert, in no apparent distress


Head exam: Present: atraumatic, normocephalic


Eye exam: Present: normal appearance, PERRL, EOMI


ENT exam: Present: mucous membranes moist


Neck exam: Present: other (Trachea is in midline)


Respiratory exam: Present: normal lung sounds bilaterally, other (Mild scattered

end-expiratory wheezes bilaterally).  Absent: respiratory distress, rales, 

rhonchi, chest wall tenderness


Cardiovascular Exam: Present: regular rate, normal rhythm, normal heart sounds, 

other (Normal radial pulses bilaterally)


GI/Abdominal exam: Present: soft.  Absent: distended, tenderness, guarding


Extremities exam: Present: other (Negative Homans sign bilaterally).  Absent: 

tenderness, pedal edema, calf tenderness


Neurological exam: Present: alert, oriented X3, CN II-XII intact.  Absent: motor

sensory deficit


Psychiatric exam: Present: normal affect, normal mood


Skin exam: Present: warm, dry, intact, normal color





Course


                                   Vital Signs











  03/28/20 03/28/20 03/28/20





  14:29 14:33 14:56


 


Temperature 98.1 F  


 


Pulse Rate 61  60


 


Respiratory 20 20 18





Rate   


 


Blood Pressure 119/59  110/59


 


O2 Sat by Pulse 99  97





Oximetry   














  03/28/20 03/28/20





  15:17 15:38


 


Temperature  


 


Pulse Rate 58 L 57 L


 


Respiratory 18 18





Rate  


 


Blood Pressure 85/56 127/61


 


O2 Sat by Pulse 97 99





Oximetry  














- Reevaluation(s)


Reevaluation #1: 





03/28/20 17:03


Patient states that his pain is now resolved, and he denies development of any 

new symptoms while in the ED.  Patient remains alert and breathing comfortably. 

I have discussed risks and benefits of hospital admission with the patient and 

his wife, and they both agree with hospital admission at this time for further c

ardiac evaluation/rule out given the patient's cardiac history.


03/28/20 17:13


Case, H&P, test results and ED/EMS management were discussed with Dr. Goodrich.  He

accepts hospital floor admission.  He has no further recommendations at this 

time.





EKG Findings





- EKG Comments:


EKG Findings:: Normal sinus rhythm, ventricular rate of 63 bpm, no ectopy, 

normal MI and QRS intervals, normal QT interval, normal axis, no ST or T-wave 

abnormality





Medical Decision Making





- Medical Decision Making





Patient reports that his chest pain began about 1.5 hours ago.  Patient's 

initial troponin is negative, and his initial EKG is unremarkable.  Patient's CT

angiogram chest is negative for pulmonary embolus.  Given the patient's cardiac 

history, will admit the patient to the hospital for further cardiac 

evaluation/workup.  Dr. Goodrich has accepted hospital admission.





- Lab Data


Result diagrams: 


                                 03/28/20 14:25





                                 03/28/20 14:25


                                   Lab Results











  03/28/20 03/28/20 03/28/20 Range/Units





  14:25 14:25 14:25 


 


WBC  5.7    (3.8-10.6)  k/uL


 


RBC  4.88    (4.30-5.90)  m/uL


 


Hgb  14.4    (13.0-17.5)  gm/dL


 


Hct  43.4    (39.0-53.0)  %


 


MCV  88.9    (80.0-100.0)  fL


 


MCH  29.6    (25.0-35.0)  pg


 


MCHC  33.3    (31.0-37.0)  g/dL


 


RDW  13.2    (11.5-15.5)  %


 


Plt Count  216    (150-450)  k/uL


 


Neutrophils %  66    %


 


Lymphocytes %  23    %


 


Monocytes %  9    %


 


Eosinophils %  1    %


 


Basophils %  0    %


 


Neutrophils #  3.7    (1.3-7.7)  k/uL


 


Lymphocytes #  1.3    (1.0-4.8)  k/uL


 


Monocytes #  0.5    (0-1.0)  k/uL


 


Eosinophils #  0.0    (0-0.7)  k/uL


 


Basophils #  0.0    (0-0.2)  k/uL


 


PT   10.0   (9.0-12.0)  sec


 


INR   1.0   (<1.2)  


 


APTT   22.7   (22.0-30.0)  sec


 


D-Dimer   1.26 H   (<0.60)  mg/L FEU


 


Sodium    138  (137-145)  mmol/L


 


Potassium    4.6  (3.5-5.1)  mmol/L


 


Chloride    106  ()  mmol/L


 


Carbon Dioxide    26  (22-30)  mmol/L


 


Anion Gap    6  mmol/L


 


BUN    18  (9-20)  mg/dL


 


Creatinine    0.96  (0.66-1.25)  mg/dL


 


Est GFR (CKD-EPI)AfAm    87  (>60 ml/min/1.73 sqM)  


 


Est GFR (CKD-EPI)NonAf    75  (>60 ml/min/1.73 sqM)  


 


Glucose    120 H  (74-99)  mg/dL


 


Calcium    9.4  (8.4-10.2)  mg/dL


 


Magnesium    2.3  (1.6-2.3)  mg/dL


 


Total Bilirubin    0.9  (0.2-1.3)  mg/dL


 


AST    38  (17-59)  U/L


 


ALT    34  (4-49)  U/L


 


Alkaline Phosphatase    59  ()  U/L


 


Troponin I     (0.000-0.034)  ng/mL


 


NT-Pro-B Natriuret Pep     pg/mL


 


Total Protein    6.7  (6.3-8.2)  g/dL


 


Albumin    4.2  (3.5-5.0)  g/dL














  03/28/20 03/28/20 Range/Units





  14:25 14:25 


 


WBC    (3.8-10.6)  k/uL


 


RBC    (4.30-5.90)  m/uL


 


Hgb    (13.0-17.5)  gm/dL


 


Hct    (39.0-53.0)  %


 


MCV    (80.0-100.0)  fL


 


MCH    (25.0-35.0)  pg


 


MCHC    (31.0-37.0)  g/dL


 


RDW    (11.5-15.5)  %


 


Plt Count    (150-450)  k/uL


 


Neutrophils %    %


 


Lymphocytes %    %


 


Monocytes %    %


 


Eosinophils %    %


 


Basophils %    %


 


Neutrophils #    (1.3-7.7)  k/uL


 


Lymphocytes #    (1.0-4.8)  k/uL


 


Monocytes #    (0-1.0)  k/uL


 


Eosinophils #    (0-0.7)  k/uL


 


Basophils #    (0-0.2)  k/uL


 


PT    (9.0-12.0)  sec


 


INR    (<1.2)  


 


APTT    (22.0-30.0)  sec


 


D-Dimer    (<0.60)  mg/L FEU


 


Sodium    (137-145)  mmol/L


 


Potassium    (3.5-5.1)  mmol/L


 


Chloride    ()  mmol/L


 


Carbon Dioxide    (22-30)  mmol/L


 


Anion Gap    mmol/L


 


BUN    (9-20)  mg/dL


 


Creatinine    (0.66-1.25)  mg/dL


 


Est GFR (CKD-EPI)AfAm    (>60 ml/min/1.73 sqM)  


 


Est GFR (CKD-EPI)NonAf    (>60 ml/min/1.73 sqM)  


 


Glucose    (74-99)  mg/dL


 


Calcium    (8.4-10.2)  mg/dL


 


Magnesium    (1.6-2.3)  mg/dL


 


Total Bilirubin    (0.2-1.3)  mg/dL


 


AST    (17-59)  U/L


 


ALT    (4-49)  U/L


 


Alkaline Phosphatase    ()  U/L


 


Troponin I  <0.012   (0.000-0.034)  ng/mL


 


NT-Pro-B Natriuret Pep   67  pg/mL


 


Total Protein    (6.3-8.2)  g/dL


 


Albumin    (3.5-5.0)  g/dL














- Radiology Data


Radiology results: report reviewed (Chest x-ray shows "chronic changes without 

acute pulmonary process"; CT angiogram chest is negative for pulmonary embolus, 

and only demonstrates mild to moderate emphysematous changes)





Disposition


Clinical Impression: 


 Chest pain





Disposition: ADMITTED AS IP TO THIS [facility]


Condition: Stable


Is patient prescribed a controlled substance at d/c from ED?: No


Referrals: 


Elisa Cobos MD [Primary Care Provider] - 1-2 days


Time of Disposition: 17:14
Anemia    Aortic stenosis    Arrhythmia    AV block, 1st degree    CAD (coronary artery disease)    DM (diabetes mellitus)    RICHARDS (dyspnea on exertion)    ESRD on dialysis  HD on Mondays and Fridays  GI bleeding  due to ulcerated polyps and colonic AVMs  HTN (hypertension)    Risk factors for obstructive sleep apnea    VT (ventricular tachycardia)

## 2020-10-22 ENCOUNTER — APPOINTMENT (OUTPATIENT)
Age: 85
End: 2020-10-22

## 2020-10-22 ENCOUNTER — APPOINTMENT (OUTPATIENT)
Dept: HEMATOLOGY ONCOLOGY | Facility: CLINIC | Age: 85
End: 2020-10-22

## 2020-10-22 LAB
ALBUMIN SERPL ELPH-MCNC: 3.5 G/DL — SIGNIFICANT CHANGE UP (ref 3.3–5.2)
ALP SERPL-CCNC: 123 U/L — HIGH (ref 40–120)
ALT FLD-CCNC: 17 U/L — SIGNIFICANT CHANGE UP
ANION GAP SERPL CALC-SCNC: 17 MMOL/L — SIGNIFICANT CHANGE UP (ref 5–17)
AST SERPL-CCNC: 22 U/L — SIGNIFICANT CHANGE UP
BASOPHILS # BLD AUTO: 0.09 K/UL — SIGNIFICANT CHANGE UP (ref 0–0.2)
BASOPHILS NFR BLD AUTO: 1.5 % — SIGNIFICANT CHANGE UP (ref 0–2)
BILIRUB SERPL-MCNC: 0.5 MG/DL — SIGNIFICANT CHANGE UP (ref 0.4–2)
BLD GP AB SCN SERPL QL: SIGNIFICANT CHANGE UP
BUN SERPL-MCNC: 50 MG/DL — HIGH (ref 8–20)
CALCIUM SERPL-MCNC: 9.2 MG/DL — SIGNIFICANT CHANGE UP (ref 8.6–10.2)
CHLORIDE SERPL-SCNC: 98 MMOL/L — SIGNIFICANT CHANGE UP (ref 98–107)
CO2 SERPL-SCNC: 25 MMOL/L — SIGNIFICANT CHANGE UP (ref 22–29)
CREAT SERPL-MCNC: 5.59 MG/DL — HIGH (ref 0.5–1.3)
EOSINOPHIL # BLD AUTO: 0.24 K/UL — SIGNIFICANT CHANGE UP (ref 0–0.5)
EOSINOPHIL NFR BLD AUTO: 4.1 % — SIGNIFICANT CHANGE UP (ref 0–6)
GLUCOSE SERPL-MCNC: 110 MG/DL — HIGH (ref 70–99)
HCT VFR BLD CALC: 35.1 % — LOW (ref 39–50)
HGB BLD-MCNC: 10.4 G/DL — LOW (ref 13–17)
IMM GRANULOCYTES NFR BLD AUTO: 0.3 % — SIGNIFICANT CHANGE UP (ref 0–1.5)
LYMPHOCYTES # BLD AUTO: 0.77 K/UL — LOW (ref 1–3.3)
LYMPHOCYTES # BLD AUTO: 13 % — SIGNIFICANT CHANGE UP (ref 13–44)
MAGNESIUM SERPL-MCNC: 2.1 MG/DL — SIGNIFICANT CHANGE UP (ref 1.6–2.6)
MCHC RBC-ENTMCNC: 28.3 PG — SIGNIFICANT CHANGE UP (ref 27–34)
MCHC RBC-ENTMCNC: 29.6 GM/DL — LOW (ref 32–36)
MCV RBC AUTO: 95.6 FL — SIGNIFICANT CHANGE UP (ref 80–100)
MONOCYTES # BLD AUTO: 0.72 K/UL — SIGNIFICANT CHANGE UP (ref 0–0.9)
MONOCYTES NFR BLD AUTO: 12.2 % — SIGNIFICANT CHANGE UP (ref 2–14)
NEUTROPHILS # BLD AUTO: 4.08 K/UL — SIGNIFICANT CHANGE UP (ref 1.8–7.4)
NEUTROPHILS NFR BLD AUTO: 68.9 % — SIGNIFICANT CHANGE UP (ref 43–77)
PHOSPHATE SERPL-MCNC: 5 MG/DL — HIGH (ref 2.4–4.7)
PLATELET # BLD AUTO: 173 K/UL — SIGNIFICANT CHANGE UP (ref 150–400)
POTASSIUM SERPL-MCNC: 4.2 MMOL/L — SIGNIFICANT CHANGE UP (ref 3.5–5.3)
POTASSIUM SERPL-SCNC: 4.2 MMOL/L — SIGNIFICANT CHANGE UP (ref 3.5–5.3)
PROT SERPL-MCNC: 6 G/DL — LOW (ref 6.6–8.7)
RBC # BLD: 3.67 M/UL — LOW (ref 4.2–5.8)
RBC # FLD: 16.3 % — HIGH (ref 10.3–14.5)
SARS-COV-2 IGG SERPL QL IA: NEGATIVE — SIGNIFICANT CHANGE UP
SARS-COV-2 IGM SERPL IA-ACNC: <0.1 INDEX — SIGNIFICANT CHANGE UP
SODIUM SERPL-SCNC: 140 MMOL/L — SIGNIFICANT CHANGE UP (ref 135–145)
WBC # BLD: 5.92 K/UL — SIGNIFICANT CHANGE UP (ref 3.8–10.5)
WBC # FLD AUTO: 5.92 K/UL — SIGNIFICANT CHANGE UP (ref 3.8–10.5)

## 2020-10-22 PROCEDURE — 99233 SBSQ HOSP IP/OBS HIGH 50: CPT | Mod: 25

## 2020-10-22 RX ORDER — HYDRALAZINE HCL 50 MG
50 TABLET ORAL
Refills: 0 | Status: DISCONTINUED | OUTPATIENT
Start: 2020-10-22 | End: 2020-10-31

## 2020-10-22 RX ORDER — DULOXETINE HYDROCHLORIDE 30 MG/1
60 CAPSULE, DELAYED RELEASE ORAL DAILY
Refills: 0 | Status: DISCONTINUED | OUTPATIENT
Start: 2020-10-22 | End: 2020-10-31

## 2020-10-22 RX ORDER — CEFAZOLIN SODIUM 1 G
2000 VIAL (EA) INJECTION ONCE
Refills: 0 | Status: COMPLETED | OUTPATIENT
Start: 2020-10-22 | End: 2020-10-27

## 2020-10-22 RX ORDER — CLOPIDOGREL BISULFATE 75 MG/1
300 TABLET, FILM COATED ORAL ONCE
Refills: 0 | Status: DISCONTINUED | OUTPATIENT
Start: 2020-10-22 | End: 2020-10-22

## 2020-10-22 RX ORDER — CLOPIDOGREL BISULFATE 75 MG/1
300 TABLET, FILM COATED ORAL ONCE
Refills: 0 | Status: COMPLETED | OUTPATIENT
Start: 2020-10-22 | End: 2020-10-22

## 2020-10-22 RX ORDER — OXYCODONE AND ACETAMINOPHEN 5; 325 MG/1; MG/1
1 TABLET ORAL ONCE
Refills: 0 | Status: DISCONTINUED | OUTPATIENT
Start: 2020-10-22 | End: 2020-10-22

## 2020-10-22 RX ORDER — HEPARIN SODIUM 5000 [USP'U]/ML
1200 INJECTION INTRAVENOUS; SUBCUTANEOUS
Qty: 25000 | Refills: 0 | Status: DISCONTINUED | OUTPATIENT
Start: 2020-10-22 | End: 2020-10-23

## 2020-10-22 RX ORDER — NIFEDIPINE 30 MG
60 TABLET, EXTENDED RELEASE 24 HR ORAL ONCE
Refills: 0 | Status: COMPLETED | OUTPATIENT
Start: 2020-10-22 | End: 2020-10-22

## 2020-10-22 RX ADMIN — Medication 60 MILLIGRAM(S): at 14:50

## 2020-10-22 RX ADMIN — Medication 50 MILLIGRAM(S): at 06:07

## 2020-10-22 RX ADMIN — Medication 81 MILLIGRAM(S): at 13:42

## 2020-10-22 RX ADMIN — ISOSORBIDE MONONITRATE 30 MILLIGRAM(S): 60 TABLET, EXTENDED RELEASE ORAL at 13:42

## 2020-10-22 RX ADMIN — Medication 50 MILLIGRAM(S): at 16:35

## 2020-10-22 RX ADMIN — HEPARIN SODIUM 5000 UNIT(S): 5000 INJECTION INTRAVENOUS; SUBCUTANEOUS at 21:18

## 2020-10-22 RX ADMIN — OXYCODONE AND ACETAMINOPHEN 1 TABLET(S): 5; 325 TABLET ORAL at 22:36

## 2020-10-22 RX ADMIN — DULOXETINE HYDROCHLORIDE 60 MILLIGRAM(S): 30 CAPSULE, DELAYED RELEASE ORAL at 14:50

## 2020-10-22 RX ADMIN — SENNA PLUS 2 TABLET(S): 8.6 TABLET ORAL at 21:19

## 2020-10-22 RX ADMIN — HEPARIN SODIUM 1200 UNIT(S)/HR: 5000 INJECTION INTRAVENOUS; SUBCUTANEOUS at 22:28

## 2020-10-22 RX ADMIN — Medication 1 MILLIGRAM(S): at 21:18

## 2020-10-22 RX ADMIN — CLOPIDOGREL BISULFATE 300 MILLIGRAM(S): 75 TABLET, FILM COATED ORAL at 13:42

## 2020-10-22 RX ADMIN — Medication 60 MILLIGRAM(S): at 21:19

## 2020-10-22 RX ADMIN — Medication 0.1 MILLIGRAM(S): at 21:19

## 2020-10-22 RX ADMIN — OXYCODONE AND ACETAMINOPHEN 1 TABLET(S): 5; 325 TABLET ORAL at 22:28

## 2020-10-22 NOTE — PROGRESS NOTE ADULT - SUBJECTIVE AND OBJECTIVE BOX
[place] [facility]                          [address] [place] [address]                       Phone: [phone]. Fax:[phone]                      ________________________________________________           Reason for follow up/Overnight events:   - No acute events overnight  - Patient presents to the cath lab holding area for anticipated RLE angiogram with intervention with Dr. Sorenson    HPI:  85 yo M w/ PAD s/p RLE angiogram with angioplasty in june 2020 presents w/ persistent RLE pain. describes pain as shooting pain in his right foot. no alleviating factors. exacerbated with prolonged standing or walking. Patient had angiogram with CO2 in june which did not significantly helping. denies other associate symptoms including f/c/sob/n/v/ha/chest pain.       ROS: All review of systems negative unless indicated otherwise below.                          LAB RESULTS                   COMPLETE BLOOD COUNT( 22 Oct 2020 06:04 )                            10.4 g/dL<L>  5.92 K/uL )---------------( 173 K/uL                        35.1 %<L>      Automated Differential     Auto Basophil # - 0.09 K/uL  Auto Basophil % - 1.5 %  Auto Eosinophil # - 0.24 K/uL  Auto Eosinophil % - 4.1 %  Auto Immature Granulocyte # - X      Auto Immature Granulocyte % - 0.3 %  Auto Lymphocyte # - 0.77 K/uL<L>  Auto Lymphocyte % - 13.0 %  Auto Monocyte # - 0.72 K/uL  Auto Monocyte % - 12.2 %  Auto Neutrophil # - 4.08 K/uL  Auto Neutrophil % - 68.9 %                                  CHEMISTRY                 Basic Metabolic Panel (10-22-20 @ 06:04)    140  |  98  |  50.0<H>  ----------------------------<  110<H>  4.2   |  25.0  |  5.59<H>    Ca    9.2      22 Oct 2020 06:04  Phos  5.0     10-22  Mg     2.1     10-22                    Liver Functions (10-22-20 @ 06:04))  TPro  6.0  /  Alb  3.5  /  TBili  0.5  /  DBili  x   /  AST  22  /  ALT  17  /  AlkPhos  123     PT/INR/PTT ( 21 Oct 2020 17:33 )                        :                       :      12.3         :       33.1                  .        .                   .              .           .       1.06        .                                                                   Cardiac Enzymes                             MICROBIOLOGY/CULTURES:                                RADIOLOGY RESULTS: Personally visualized                           CARDIOLOGY RESULTS: Official Report/Preliminary Verbal Reports    ECHO:   < from: TTE Echo Limited or F/U (09.17.20 @ 21:31) >  Left Ventricle: The left ventricular internal cavity size is normal.  Global LV systolic function was normal.Left ventricular ejection fraction, by visual estimation, is 60 to 65%.  Right Ventricle: Normal right ventricular size and function.  Left Atrium: Mildly enlarged left atrium.  Right Atrium: The right atrium is normal in size.  Pericardium: There isno evidence of pericardial effusion.  Tricuspid Valve: No tricuspid regurgitation is visualized.  Aortic Valve: No evidence of aortic valve regurgitation is seen. Bioprosthetic aortic valve visualized. Likely Padilla JENN. well seated valve. Acceptable gradients. No regurgitation.  Venous: The inferior vena cava was dilated, with respiratory size variation less than 50%.      Summary:   1. Patient tachycardic during the entire study.   2. Mildly enlarged left atrium.   3. There is mild concentricleft ventricular hypertrophy.   4. Hyperdyanmic wall motion. Left ventricular ejection fraction, by visual estimation, is 60 to 65%.   5. The right atrium is normal in size.   6. Normal right ventricular size and function.   7. Bioprosthetic aortic valve visualized. Likely Padilla JENN. well seated valve. Acceptable gradients. No regurgitation.   8. There is no evidence of pericardial effusion.    < end of copied text >    STRESS:   CATH:                         CARDIOLOGY REVIEW: Personally visualized and reviewed    EKG: < from: 12 Lead ECG (09.15.20 @ 04:55) >      < end of copied text >    Telemetry Last 24h:                              DAILY WEIGHTS - 48 HOUR TREND     Daily                              INTAKE AND OUTPUT - 48 HOUR TREND       HOME MEDICATIONS:  aspirin 81 mg oral delayed release tablet: 1 tab(s) orally once a day (15 Aug 2020 01:58)  atorvastatin 80 mg oral tablet: 1 tab(s) orally once a day (18 Sep 2020 14:02)  cloNIDine 0.1 mg oral tablet: orally once a day (at bedtime) (21 Oct 2020 18:04)  Cymbalta 60 mg oral delayed release capsule: 1 cap(s) orally once a day (21 Oct 2020 18:01)  hydrALAZINE 50 mg oral tablet: 1 tab(s) orally 2 times a day (before meals) (21 Oct 2020 18:04)  isosorbide mononitrate 30 mg oral tablet, extended release: 1 tab(s) orally once a day (15 Aug 2020 01:58)  Nephro-Thomas oral tablet: 1 tab(s) orally once a day (15 Aug 2020 01:58)  NIFEdipine 60 mg oral tablet, extended release: 1 tab(s) orally once (at bedtime) (21 Oct 2020 18:02)  NIFEdipine 90 mg oral tablet, extended release: 1 tab(s) orally once a day  (21 Oct 2020 18:02)  torsemide 20 mg oral tablet: 1 tab(s) orally once a day (21 Oct 2020 18:03)                             Current Admission Active Medications    acetaminophen   Tablet .. 650 milliGRAM(s) Oral every 6 hours PRN Mild Pain (1 - 3)  aspirin enteric coated 81 milliGRAM(s) Oral daily  cloNIDine 0.1 milliGRAM(s) Oral at bedtime  dextrose 40% Gel 15 Gram(s) Oral once PRN Blood Glucose LESS THAN 70 milliGRAM(s)/deciliter  dextrose 5%. 1000 milliLiter(s) (50 mL/Hr) IV Continuous <Continuous>  dextrose 50% Injectable 12.5 Gram(s) IV Push once  dextrose 50% Injectable 25 Gram(s) IV Push once  dextrose 50% Injectable 25 Gram(s) IV Push once  doxazosin 1 milliGRAM(s) Oral at bedtime  glucagon  Injectable 1 milliGRAM(s) IntraMuscular once PRN Glucose LESS THAN 70 milligrams/deciliter  heparin   Injectable 5000 Unit(s) SubCutaneous every 8 hours  hydrALAZINE  Oral Tab/Cap - Peds 50 milliGRAM(s) Oral two times a day before meals  insulin lispro (ADMELOG) corrective regimen sliding scale   SubCutaneous three times a day before meals  isosorbide   mononitrate ER Tablet (IMDUR) 30 milliGRAM(s) Oral daily  NIFEdipine XL 60 milliGRAM(s) Oral at bedtime  ondansetron Injectable 4 milliGRAM(s) IV Push every 6 hours PRN Nausea  senna 2 Tablet(s) Oral at bedtime                        PHYSICAL EXAM:    Vital Signs Last 24 Hrs  T(C): 36.9 (22 Oct 2020 07:02), Max: 37.2 (21 Oct 2020 16:08)  T(F): 98.4 (22 Oct 2020 07:02), Max: 98.9 (21 Oct 2020 16:08)  HR: 89 (22 Oct 2020 07:02) (75 - 89)  BP: 177/74 (22 Oct 2020 07:02) (140/55 - 177/74)  BP(mean): --  RR: 18 (22 Oct 2020 07:02) (18 - 19)  SpO2: 95% (22 Oct 2020 07:02) (92% - 98%)    GENERAL: NAD  NECK: Supple, No JVD  NERVOUS SYSTEM:  Alert & Oriented X3, non focal neuro exam.   CHEST/LUNG: clear lungs, No rales, rhonchi, wheezing, or rubs  HEART: Regular rate and rhythm; s1 and s2 auscultated, No murmurs, rubs, or gallops  ABDOMEN: Soft, Nontender, Nondistended; Bowel sounds present and normoactive.   EXTREMITIES: Peripheral Pulses via doppler.  + digits cyanotic bilaterally. + pain upon palpation  .CATH SITE:      [place] [facility]                          [address] [place] [address]                       Phone: [phone]. Fax:[phone]                      ________________________________________________           Reason for follow up/Overnight events:   - No acute events overnight  - Patient presents to the cath lab holding area for anticipated RLE angiogram with intervention with Dr. Sorenson    HPI:  87 yo M w/ PAD s/p RLE angiogram with angioplasty in june 2020 presents w/ persistent RLE pain. describes pain as shooting pain in his right foot. no alleviating factors. exacerbated with prolonged standing or walking. Patient had angiogram with CO2 in june which did not significantly helping. denies other associate symptoms including f/c/sob/n/v/ha/chest pain.       ROS: All review of systems negative unless indicated otherwise below.                          LAB RESULTS                   COMPLETE BLOOD COUNT( 22 Oct 2020 06:04 )                            10.4 g/dL<L>  5.92 K/uL )---------------( 173 K/uL                        35.1 %<L>      Automated Differential     Auto Basophil # - 0.09 K/uL  Auto Basophil % - 1.5 %  Auto Eosinophil # - 0.24 K/uL  Auto Eosinophil % - 4.1 %  Auto Immature Granulocyte # - X      Auto Immature Granulocyte % - 0.3 %  Auto Lymphocyte # - 0.77 K/uL<L>  Auto Lymphocyte % - 13.0 %  Auto Monocyte # - 0.72 K/uL  Auto Monocyte % - 12.2 %  Auto Neutrophil # - 4.08 K/uL  Auto Neutrophil % - 68.9 %                                  CHEMISTRY                 Basic Metabolic Panel (10-22-20 @ 06:04)    140  |  98  |  50.0<H>  ----------------------------<  110<H>  4.2   |  25.0  |  5.59<H>    Ca    9.2      22 Oct 2020 06:04  Phos  5.0     10-22  Mg     2.1     10-22                    Liver Functions (10-22-20 @ 06:04))  TPro  6.0  /  Alb  3.5  /  TBili  0.5  /  DBili  x   /  AST  22  /  ALT  17  /  AlkPhos  123     PT/INR/PTT ( 21 Oct 2020 17:33 )                        :                       :      12.3         :       33.1                  .        .                   .              .           .       1.06        .                                                                   Cardiac Enzymes                             MICROBIOLOGY/CULTURES:                                RADIOLOGY RESULTS: Personally visualized                           CARDIOLOGY RESULTS: Official Report/Preliminary Verbal Reports    ECHO:   < from: TTE Echo Limited or F/U (09.17.20 @ 21:31) >  Left Ventricle: The left ventricular internal cavity size is normal.  Global LV systolic function was normal.Left ventricular ejection fraction, by visual estimation, is 60 to 65%.  Right Ventricle: Normal right ventricular size and function.  Left Atrium: Mildly enlarged left atrium.  Right Atrium: The right atrium is normal in size.  Pericardium: There isno evidence of pericardial effusion.  Tricuspid Valve: No tricuspid regurgitation is visualized.  Aortic Valve: No evidence of aortic valve regurgitation is seen. Bioprosthetic aortic valve visualized. Likely Padilla JENN. well seated valve. Acceptable gradients. No regurgitation.  Venous: The inferior vena cava was dilated, with respiratory size variation less than 50%.      Summary:   1. Patient tachycardic during the entire study.   2. Mildly enlarged left atrium.   3. There is mild concentricleft ventricular hypertrophy.   4. Hyperdyanmic wall motion. Left ventricular ejection fraction, by visual estimation, is 60 to 65%.   5. The right atrium is normal in size.   6. Normal right ventricular size and function.   7. Bioprosthetic aortic valve visualized. Likely Padilla JENN. well seated valve. Acceptable gradients. No regurgitation.   8. There is no evidence of pericardial effusion.    < end of copied text >    STRESS:   CATH:                         CARDIOLOGY REVIEW: Personally visualized and reviewed    EKG: < from: 12 Lead ECG (09.15.20 @ 04:55) >      < end of copied text >    Telemetry Last 24h:                              DAILY WEIGHTS - 48 HOUR TREND     Daily                              INTAKE AND OUTPUT - 48 HOUR TREND       HOME MEDICATIONS:  aspirin 81 mg oral delayed release tablet: 1 tab(s) orally once a day (15 Aug 2020 01:58)  atorvastatin 80 mg oral tablet: 1 tab(s) orally once a day (18 Sep 2020 14:02)  cloNIDine 0.1 mg oral tablet: orally once a day (at bedtime) (21 Oct 2020 18:04)  Cymbalta 60 mg oral delayed release capsule: 1 cap(s) orally once a day (21 Oct 2020 18:01)  hydrALAZINE 50 mg oral tablet: 1 tab(s) orally 2 times a day (before meals) (21 Oct 2020 18:04)  isosorbide mononitrate 30 mg oral tablet, extended release: 1 tab(s) orally once a day (15 Aug 2020 01:58)  Nephro-Thomas oral tablet: 1 tab(s) orally once a day (15 Aug 2020 01:58)  NIFEdipine 60 mg oral tablet, extended release: 1 tab(s) orally once (at bedtime) (21 Oct 2020 18:02)  NIFEdipine 90 mg oral tablet, extended release: 1 tab(s) orally once a day  (21 Oct 2020 18:02)  torsemide 20 mg oral tablet: 1 tab(s) orally once a day (21 Oct 2020 18:03)                             Current Admission Active Medications    acetaminophen   Tablet .. 650 milliGRAM(s) Oral every 6 hours PRN Mild Pain (1 - 3)  aspirin enteric coated 81 milliGRAM(s) Oral daily  cloNIDine 0.1 milliGRAM(s) Oral at bedtime  dextrose 40% Gel 15 Gram(s) Oral once PRN Blood Glucose LESS THAN 70 milliGRAM(s)/deciliter  dextrose 5%. 1000 milliLiter(s) (50 mL/Hr) IV Continuous <Continuous>  dextrose 50% Injectable 12.5 Gram(s) IV Push once  dextrose 50% Injectable 25 Gram(s) IV Push once  dextrose 50% Injectable 25 Gram(s) IV Push once  doxazosin 1 milliGRAM(s) Oral at bedtime  glucagon  Injectable 1 milliGRAM(s) IntraMuscular once PRN Glucose LESS THAN 70 milligrams/deciliter  heparin   Injectable 5000 Unit(s) SubCutaneous every 8 hours  hydrALAZINE  Oral Tab/Cap - Peds 50 milliGRAM(s) Oral two times a day before meals  insulin lispro (ADMELOG) corrective regimen sliding scale   SubCutaneous three times a day before meals  isosorbide   mononitrate ER Tablet (IMDUR) 30 milliGRAM(s) Oral daily  NIFEdipine XL 60 milliGRAM(s) Oral at bedtime  ondansetron Injectable 4 milliGRAM(s) IV Push every 6 hours PRN Nausea  senna 2 Tablet(s) Oral at bedtime                        PHYSICAL EXAM:    Vital Signs Last 24 Hrs  T(C): 36.9 (22 Oct 2020 07:02), Max: 37.2 (21 Oct 2020 16:08)  T(F): 98.4 (22 Oct 2020 07:02), Max: 98.9 (21 Oct 2020 16:08)  HR: 89 (22 Oct 2020 07:02) (75 - 89)  BP: 177/74 (22 Oct 2020 07:02) (140/55 - 177/74)  BP(mean): --  RR: 18 (22 Oct 2020 07:02) (18 - 19)  SpO2: 95% (22 Oct 2020 07:02) (92% - 98%)    GENERAL: NAD  NECK: Supple, No JVD  NERVOUS SYSTEM:  Alert & Oriented X3, non focal neuro exam.   CHEST/LUNG: clear lungs, No rales, rhonchi, wheezing, or rubs  HEART: Regular rate and rhythm; s1 and s2 auscultated, No murmurs, rubs, or gallops  ABDOMEN: Soft, Nontender, Nondistended; Bowel sounds present and normoactive.   EXTREMITIES: Peripheral Pulses via doppler.  + digits cyanotic bilaterally. + pain upon palpation

## 2020-10-22 NOTE — PROGRESS NOTE ADULT - ASSESSMENT
86y  Male is now s/p left heart catheterization via right ankle and left groin approach with Dr. Sorenson    -Pt is already in-patient  -post cath orders  -ankle and groin precautions  -bedrest x 4 hours post procedure  -continue current medical therapy  -Continue single antiplatelet therapy  -statin therapy  -Vascular surgery following during hospitalization  -Management per Vascular surgery     86y  Male is now s/p RLE angiography with stent placement via right ankle and left groin approach with Dr. Sorenson    -Pt is already in-patient  -post cath orders  -ankle and groin precautions  -bedrest x 4 hours post procedure  -continue current medical therapy  -Continue single antiplatelet therapy  -statin therapy  -Vascular surgery following during hospitalization  -Management per Vascular surgery

## 2020-10-22 NOTE — BRIEF OPERATIVE NOTE - OPERATION/FINDINGS
Right lower extremity angiogram via left groin and right DP access. Balloon angioplasty of right SFA, Popliteal, and Peroneal arteries. Popliteal stents x3. Hemostasis obtained. No complications.

## 2020-10-22 NOTE — CONSULT NOTE ADULT - ASSESSMENT
1) ESRD on HD  2)  PAD s/p RLE angiogram with stent placement via right ankle and left groin approach  3) Anemia of CKD    No indication for HD today  Will schedule for HD tomorrow  Hb stable- continue AMY with HD  Monitor H/H.

## 2020-10-22 NOTE — BRIEF OPERATIVE NOTE - NSICDXBRIEFPREOP_GEN_ALL_CORE_FT
PRE-OP DIAGNOSIS:  Claudication 22-Oct-2020 13:16:53  Iam Joseph  Claudication 22-Oct-2020 13:16:44  Iam Joseph

## 2020-10-22 NOTE — PROGRESS NOTE ADULT - SUBJECTIVE AND OBJECTIVE BOX
Department of Cardiology                                                                  [facility]/[place] [address] [address] [facility]                                                            Telephone: [phone]. Fax:[phone]                                                    Post- Procedure Note: Left Heart Cardiac Catheterization       Narrative:  86y  Male is now s/p RLE angiogram with stent placement via right ankle and left groin approach with Dr. Sorenson.    Official report to follow.    Popliteal stents x 2  SFA stent x 1    Total heparin: 8000 units  Total dye: 100 cc           PAST MEDICAL & SURGICAL HISTORY:  GI bleeding  due to ulcerated polyps and colonic AVMs    Aortic stenosis  - s/p valve replacement    ESRD on dialysis  HD on Mondays and Fridays    Risk factors for obstructive sleep apnea    Anemia    RICHARDS (dyspnea on exertion)    VT (ventricular tachycardia)    HTN (hypertension)    CAD (coronary artery disease)    Arrhythmia    AV block, 1st degree    DM (diabetes mellitus)  - resolved    S/P TAVR (transcatheter aortic valve replacement)    H/O carotid endarterectomy  Right    A-V fistula  left arm 5/2017    H/O angioplasty  2013,  no  intervention    H/O left knee surgery    H/O circumcision  at  age  65      FAMILY HISTORY:  FH: type 2 diabetes    Family history of premature CAD    Family history of lung cancer (Grandparent)    Family history of cancer (Grandparent)      Home Medications:  aspirin 81 mg oral delayed release tablet: 1 tab(s) orally once a day (15 Aug 2020 01:58)  atorvastatin 80 mg oral tablet: 1 tab(s) orally once a day (18 Sep 2020 14:02)  cloNIDine 0.1 mg oral tablet: orally once a day (at bedtime) (21 Oct 2020 18:04)  Cymbalta 60 mg oral delayed release capsule: 1 cap(s) orally once a day (21 Oct 2020 18:01)  hydrALAZINE 50 mg oral tablet: 1 tab(s) orally 2 times a day (before meals) (21 Oct 2020 18:04)  isosorbide mononitrate 30 mg oral tablet, extended release: 1 tab(s) orally once a day (15 Aug 2020 01:58)  Nephro-Thomas oral tablet: 1 tab(s) orally once a day (15 Aug 2020 01:58)  NIFEdipine 60 mg oral tablet, extended release: 1 tab(s) orally once (at bedtime) (21 Oct 2020 18:02)  NIFEdipine 90 mg oral tablet, extended release: 1 tab(s) orally once a day  (21 Oct 2020 18:02)  torsemide 20 mg oral tablet: 1 tab(s) orally once a day (21 Oct 2020 18:03)    Patient is a 86y old  Male who presents with a chief complaint of   HEALTH ISSUES - PROBLEM Dx:        HPI:  87 yo M w/ PAD s/p RLE angiogram with angioplasty in june 2020 presents w/ persistent RLE pain. describes pain as shooting pain in his right foot. no alleviating factors. exacerbated with prolonged standing or walking. Patient had angiogram with CO2 in june which did not significantly helping. denies other associate symptoms including f/c/sob/n/v/ha/chest pain.  (21 Oct 2020 17:57)    General: No fatigue, no fevers/chills  Respiratory: No dyspnea, no cough, no wheeze  CV: No chest pain, no palpitations  Abd: No nausea  Neuro: No headache, no dizziness  Plavix (Hives)  provide vanilla nepro TID- RD ok (Unknown)  Toprol-XL (Rash)      Objective:  Vital Signs Last 24 Hrs  T(C): 36.9 (22 Oct 2020 07:02), Max: 37.2 (21 Oct 2020 16:08)  T(F): 98.4 (22 Oct 2020 07:02), Max: 98.9 (21 Oct 2020 16:08)  HR: 89 (22 Oct 2020 07:02) (75 - 89)  BP: 177/74 (22 Oct 2020 07:02) (140/55 - 177/74)  BP(mean): --  RR: 18 (22 Oct 2020 07:02) (18 - 19)  SpO2: 95% (22 Oct 2020 07:02) (92% - 98%)    CM: SR  Neuro: A&OX3, CN 2-12 intact  HEENT: NC, AT  Lungs: CTA B/L  CV: S1, S2, no murmur, RRR  Abd: Soft  Left Groin: Soft, no bleeding, no hematoma  Right ankle: no bleeding no hematoma  Extremity: + distal pulses                          10.4   5.92  )-----------( 173      ( 22 Oct 2020 06:04 )             35.1     10-22    140  |  98  |  50.0<H>  ----------------------------<  110<H>  4.2   |  25.0  |  5.59<H>    Ca    9.2      22 Oct 2020 06:04  Phos  5.0     10-22  Mg     2.1     10-22    TPro  6.0<L>  /  Alb  3.5  /  TBili  0.5  /  DBili  x   /  AST  22  /  ALT  17  /  AlkPhos  123<H>  10-22    PT/INR - ( 21 Oct 2020 17:33 )   PT: 12.3 sec;   INR: 1.06 ratio         PTT - ( 21 Oct 2020 17:33 )  PTT:33.1 sec       Department of Cardiology                                                                  [facility]/[place] [address] [address] [facility]                                                            Telephone: [phone]. Fax:[phone]                                                    Post- Procedure Note: RLE Angiogram with stent placement      Narrative:  86y  Male is now s/p RLE angiogram with stent placement via right ankle and left groin approach with Dr. Sorenson.    Official report to follow.    Popliteal stents x 2  SFA stent x 1    Total heparin: 8000 units  Total dye: 100 cc           PAST MEDICAL & SURGICAL HISTORY:  GI bleeding  due to ulcerated polyps and colonic AVMs    Aortic stenosis  - s/p valve replacement    ESRD on dialysis  HD on Mondays and Fridays    Risk factors for obstructive sleep apnea    Anemia    RICHARDS (dyspnea on exertion)    VT (ventricular tachycardia)    HTN (hypertension)    CAD (coronary artery disease)    Arrhythmia    AV block, 1st degree    DM (diabetes mellitus)  - resolved    S/P TAVR (transcatheter aortic valve replacement)    H/O carotid endarterectomy  Right    A-V fistula  left arm 5/2017    H/O angioplasty  2013,  no  intervention    H/O left knee surgery    H/O circumcision  at  age  65      FAMILY HISTORY:  FH: type 2 diabetes    Family history of premature CAD    Family history of lung cancer (Grandparent)    Family history of cancer (Grandparent)      Home Medications:  aspirin 81 mg oral delayed release tablet: 1 tab(s) orally once a day (15 Aug 2020 01:58)  atorvastatin 80 mg oral tablet: 1 tab(s) orally once a day (18 Sep 2020 14:02)  cloNIDine 0.1 mg oral tablet: orally once a day (at bedtime) (21 Oct 2020 18:04)  Cymbalta 60 mg oral delayed release capsule: 1 cap(s) orally once a day (21 Oct 2020 18:01)  hydrALAZINE 50 mg oral tablet: 1 tab(s) orally 2 times a day (before meals) (21 Oct 2020 18:04)  isosorbide mononitrate 30 mg oral tablet, extended release: 1 tab(s) orally once a day (15 Aug 2020 01:58)  Nephro-Thomas oral tablet: 1 tab(s) orally once a day (15 Aug 2020 01:58)  NIFEdipine 60 mg oral tablet, extended release: 1 tab(s) orally once (at bedtime) (21 Oct 2020 18:02)  NIFEdipine 90 mg oral tablet, extended release: 1 tab(s) orally once a day  (21 Oct 2020 18:02)  torsemide 20 mg oral tablet: 1 tab(s) orally once a day (21 Oct 2020 18:03)    Patient is a 86y old  Male who presents with a chief complaint of   HEALTH ISSUES - PROBLEM Dx:        HPI:  87 yo M w/ PAD s/p RLE angiogram with angioplasty in june 2020 presents w/ persistent RLE pain. describes pain as shooting pain in his right foot. no alleviating factors. exacerbated with prolonged standing or walking. Patient had angiogram with CO2 in june which did not significantly helping. denies other associate symptoms including f/c/sob/n/v/ha/chest pain.  (21 Oct 2020 17:57)    General: No fatigue, no fevers/chills  Respiratory: No dyspnea, no cough, no wheeze  CV: No chest pain, no palpitations  Abd: No nausea  Neuro: No headache, no dizziness  Plavix (Hives)  provide vanilla nepro TID- RD ok (Unknown)  Toprol-XL (Rash)      Objective:  Vital Signs Last 24 Hrs  T(C): 36.9 (22 Oct 2020 07:02), Max: 37.2 (21 Oct 2020 16:08)  T(F): 98.4 (22 Oct 2020 07:02), Max: 98.9 (21 Oct 2020 16:08)  HR: 89 (22 Oct 2020 07:02) (75 - 89)  BP: 177/74 (22 Oct 2020 07:02) (140/55 - 177/74)  BP(mean): --  RR: 18 (22 Oct 2020 07:02) (18 - 19)  SpO2: 95% (22 Oct 2020 07:02) (92% - 98%)    CM: SR  Neuro: A&OX3, CN 2-12 intact  HEENT: NC, AT  Lungs: CTA B/L  CV: S1, S2, no murmur, RRR  Abd: Soft  Left Groin: Soft, no bleeding, no hematoma  Right ankle: no bleeding no hematoma  Extremity: + distal pulses                          10.4   5.92  )-----------( 173      ( 22 Oct 2020 06:04 )             35.1     10-22    140  |  98  |  50.0<H>  ----------------------------<  110<H>  4.2   |  25.0  |  5.59<H>    Ca    9.2      22 Oct 2020 06:04  Phos  5.0     10-22  Mg     2.1     10-22    TPro  6.0<L>  /  Alb  3.5  /  TBili  0.5  /  DBili  x   /  AST  22  /  ALT  17  /  AlkPhos  123<H>  10-22    PT/INR - ( 21 Oct 2020 17:33 )   PT: 12.3 sec;   INR: 1.06 ratio         PTT - ( 21 Oct 2020 17:33 )  PTT:33.1 sec

## 2020-10-22 NOTE — PROGRESS NOTE ADULT - ASSESSMENT
87 yo M w/ PAD s/p RLE angiogram with angioplasty in june 2020 presents w/ persistent RLE pain. describes pain as shooting pain in his right foot. no alleviating factors. exacerbated with prolonged standing or walking. Patient had angiogram with CO2 in june which did not significantly helping. denies other associate symptoms including f/c/sob/n/v/ha/chest pain.  Patient presents now for RLE angiogram with angioplasty with Dr. Sorenson    - Consent to be obtained by physician  - 12 Lead EKG, Labs and imaging to be reviewed  - Procedure explained at length.  Risks v benefits reviewed.  All questions answered.

## 2020-10-22 NOTE — CHART NOTE - NSCHARTNOTEFT_GEN_A_CORE
Patient examined at bedside. No complaints. States that he has decrease sensation in toes that had been unchanged since before surgical intervention. Denies fever, chills, nausea, vomiting, chest pain, and sob.       PE:  Gen: AAOx3. Not in distress  HEENT: EOMI, PERRLA, MMM  CV: RRR  Rest: Non labored breathing  GI: abdomen soft, nttp, nd  MSK: Right groin c/d/i w/o evidence of hematoma. RLE intact ROM with decreased sensation in toes. Toes cold to touch.  Vasc: RLE doppler DP signal to mid shin, no signal on foot. No PT signal obtained.       Plan:  -Start heparin drip at 1200U/hr.  -Neurovascular checks   -Pain control

## 2020-10-22 NOTE — CONSULT NOTE ADULT - SUBJECTIVE AND OBJECTIVE BOX
[facility] DIVISION OF KIDNEY DISEASES AND HYPERTENSION -- INITIAL CONSULT NOTE  --------------------------------------------------------------------------------  HPI:        PAST HISTORY  --------------------------------------------------------------------------------  PAST MEDICAL & SURGICAL HISTORY:  GI bleeding  due to ulcerated polyps and colonic AVMs    Aortic stenosis  - s/p valve replacement    ESRD on dialysis  HD on Mondays and Fridays    Risk factors for obstructive sleep apnea    Anemia    RICHARDS (dyspnea on exertion)    VT (ventricular tachycardia)    HTN (hypertension)    CAD (coronary artery disease)    Arrhythmia    AV block, 1st degree    DM (diabetes mellitus)  - resolved    S/P TAVR (transcatheter aortic valve replacement)    H/O carotid endarterectomy  Right    A-V fistula  left arm 5/2017    H/O angioplasty  2013,  no  intervention    H/O left knee surgery    H/O circumcision  at  age  65      FAMILY HISTORY:  FH: type 2 diabetes    Family history of premature CAD    Family history of lung cancer (Grandparent)    Family history of cancer (Grandparent)      PAST SOCIAL HISTORY:    ALLERGIES & MEDICATIONS  --------------------------------------------------------------------------------  Allergies    Plavix (Hives)  Toprol-XL (Rash)    Intolerances    provide vanilla nepro TID- RD ok (Unknown)    Standing Inpatient Medications  aspirin enteric coated 81 milliGRAM(s) Oral daily  cloNIDine 0.1 milliGRAM(s) Oral at bedtime  dextrose 5%. 1000 milliLiter(s) IV Continuous <Continuous>  dextrose 50% Injectable 12.5 Gram(s) IV Push once  dextrose 50% Injectable 25 Gram(s) IV Push once  dextrose 50% Injectable 25 Gram(s) IV Push once  doxazosin 1 milliGRAM(s) Oral at bedtime  DULoxetine 60 milliGRAM(s) Oral daily  heparin   Injectable 5000 Unit(s) SubCutaneous every 8 hours  hydrALAZINE  Oral Tab/Cap - Peds 50 milliGRAM(s) Oral two times a day before meals  insulin lispro (ADMELOG) corrective regimen sliding scale   SubCutaneous three times a day before meals  isosorbide   mononitrate ER Tablet (IMDUR) 30 milliGRAM(s) Oral daily  NIFEdipine XL 60 milliGRAM(s) Oral once  NIFEdipine XL 60 milliGRAM(s) Oral at bedtime  senna 2 Tablet(s) Oral at bedtime    PRN Inpatient Medications  acetaminophen   Tablet .. 650 milliGRAM(s) Oral every 6 hours PRN  dextrose 40% Gel 15 Gram(s) Oral once PRN  glucagon  Injectable 1 milliGRAM(s) IntraMuscular once PRN  ondansetron Injectable 4 milliGRAM(s) IV Push every 6 hours PRN      REVIEW OF SYSTEMS  --------------------------------------------------------------------------------  unable to obtain    VITALS/PHYSICAL EXAM  --------------------------------------------------------------------------------  T(C): 36.9 (10-22-20 @ 07:02), Max: 37.2 (10-21-20 @ 16:08)  HR: 76 (10-22-20 @ 14:15) (75 - 89)  BP: 169/71 (10-22-20 @ 13:45) (140/55 - 184/81)  RR: 15 (10-22-20 @ 14:15) (15 - 19)  SpO2: 98% (10-22-20 @ 14:15) (92% - 98%)  Wt(kg): --  Height (cm): 165.1 (10-21-20 @ 16:08)  Weight (kg): 95.3 (10-21-20 @ 16:08)  BMI (kg/m2): 35 (10-21-20 @ 16:08)  BSA (m2): 2.02 (10-21-20 @ 16:08)      Physical Exam:  	Gen: NAD  	HEENT: PERRL, supple neck  	Pulm: CTA B/L  	CV: RRR, S1S2; no rub  	Back: No spinal or CVA tenderness; no sacral edema  	Abd: +BS, soft, nontender/nondistended  	: No suprapubic tenderness  	UE: Warm,  no edema  	LE: Warm,  no edema  	Neuro: No focal deficit  	Skin: Warm  	Vascular access: BARBRA AVF    LABS/STUDIES  --------------------------------------------------------------------------------              10.4   5.92  >-----------<  173      [10-22-20 @ 06:04]              35.1     140  |  98  |  50.0  ----------------------------<  110      [10-22-20 @ 06:04]  4.2   |  25.0  |  5.59        Ca     9.2     [10-22-20 @ 06:04]      Mg     2.1     [10-22-20 @ 06:04]      Phos  5.0     [10-22-20 @ 06:04]    TPro  6.0  /  Alb  3.5  /  TBili  0.5  /  DBili  x   /  AST  22  /  ALT  17  /  AlkPhos  123  [10-22-20 @ 06:04]    PT/INR: PT 12.3 , INR 1.06       [10-21-20 @ 17:33]  PTT: 33.1       [10-21-20 @ 17:33]      Creatinine Trend:  SCr 5.59 [10-22 @ 06:04]  SCr 4.76 [10-21 @ 17:33]    Urinalysis - [09-15-20 @ 02:01]      Color Yellow / Appearance Clear / SG 1.010 / pH 8.0      Gluc 100 / Ketone Negative  / Bili Negative / Urobili Negative       Blood Trace / Protein 100 / Leuk Est Trace / Nitrite Negative      RBC 0-2 / WBC 0-2 / Hyaline  / Gran  / Sq Epi  / Non Sq Epi Occasional / Bacteria Occasional      Iron 53, TIBC 266, %sat 20      [08-19-20 @ 06:32]  Ferritin 184      [08-19-20 @ 06:32]  PTH -- (Ca 9.6)      [08-19-20 @ 16:08]   91  Vitamin D (25OH) 26.4      [08-19-20 @ 16:08]  HbA1c 4.9      [08-09-18 @ 05:54]  TSH 2.16      [09-15-20 @ 02:01]    HBsAb <3.0      [06-25-20 @ 02:07]  HBsAg Nonreact      [06-25-20 @ 02:07]  HBcAb Nonreact      [06-25-20 @ 02:07]  HCV 0.11, Nonreact      [06-25-20 @ 02:07]     [facility] DIVISION OF KIDNEY DISEASES AND HYPERTENSION -- INITIAL CONSULT NOTE  --------------------------------------------------------------------------------  HPI:    ""85 yo M w/ PAD s/p RLE angiogram with angioplasty in june 2020 presents w/ persistent RLE pain. describes pain as shooting pain in his right foot. no alleviating factors. exacerbated with prolonged standing or walking. Patient had angiogram with CO2 in june which did not significantly helping. denies other associate symptoms including f/c/sob/n/v/ha/chest pain. ''      Nephrology consulted for HD. Pt has ESRD getting HD MF schedule at [facility]. His nephrologist is Dr. Avendano.  PAST HISTORY  --------------------------------------------------------------------------------  PAST MEDICAL & SURGICAL HISTORY:  GI bleeding  due to ulcerated polyps and colonic AVMs    Aortic stenosis  - s/p valve replacement    ESRD on dialysis  HD on Mondays and Fridays    Risk factors for obstructive sleep apnea    Anemia    RICHARDS (dyspnea on exertion)    VT (ventricular tachycardia)    HTN (hypertension)    CAD (coronary artery disease)    Arrhythmia    AV block, 1st degree    DM (diabetes mellitus)  - resolved    S/P TAVR (transcatheter aortic valve replacement)    H/O carotid endarterectomy  Right    A-V fistula  left arm 5/2017    H/O angioplasty  2013,  no  intervention    H/O left knee surgery    H/O circumcision  at  age  65      FAMILY HISTORY:  FH: type 2 diabetes    Family history of premature CAD    Family history of lung cancer (Grandparent)    Family history of cancer (Grandparent)      PAST SOCIAL HISTORY:    ALLERGIES & MEDICATIONS  --------------------------------------------------------------------------------  Allergies    Plavix (Hives)  Toprol-XL (Rash)    Intolerances    provide vanilla nepro TID- RD ok (Unknown)    Standing Inpatient Medications  aspirin enteric coated 81 milliGRAM(s) Oral daily  cloNIDine 0.1 milliGRAM(s) Oral at bedtime  dextrose 5%. 1000 milliLiter(s) IV Continuous <Continuous>  dextrose 50% Injectable 12.5 Gram(s) IV Push once  dextrose 50% Injectable 25 Gram(s) IV Push once  dextrose 50% Injectable 25 Gram(s) IV Push once  doxazosin 1 milliGRAM(s) Oral at bedtime  DULoxetine 60 milliGRAM(s) Oral daily  heparin   Injectable 5000 Unit(s) SubCutaneous every 8 hours  hydrALAZINE  Oral Tab/Cap - Peds 50 milliGRAM(s) Oral two times a day before meals  insulin lispro (ADMELOG) corrective regimen sliding scale   SubCutaneous three times a day before meals  isosorbide   mononitrate ER Tablet (IMDUR) 30 milliGRAM(s) Oral daily  NIFEdipine XL 60 milliGRAM(s) Oral once  NIFEdipine XL 60 milliGRAM(s) Oral at bedtime  senna 2 Tablet(s) Oral at bedtime    PRN Inpatient Medications  acetaminophen   Tablet .. 650 milliGRAM(s) Oral every 6 hours PRN  dextrose 40% Gel 15 Gram(s) Oral once PRN  glucagon  Injectable 1 milliGRAM(s) IntraMuscular once PRN  ondansetron Injectable 4 milliGRAM(s) IV Push every 6 hours PRN      REVIEW OF SYSTEMS  --------------------------------------------------------------------------------  unable to obtain    VITALS/PHYSICAL EXAM  --------------------------------------------------------------------------------  T(C): 36.9 (10-22-20 @ 07:02), Max: 37.2 (10-21-20 @ 16:08)  HR: 76 (10-22-20 @ 14:15) (75 - 89)  BP: 169/71 (10-22-20 @ 13:45) (140/55 - 184/81)  RR: 15 (10-22-20 @ 14:15) (15 - 19)  SpO2: 98% (10-22-20 @ 14:15) (92% - 98%)  Wt(kg): --  Height (cm): 165.1 (10-21-20 @ 16:08)  Weight (kg): 95.3 (10-21-20 @ 16:08)  BMI (kg/m2): 35 (10-21-20 @ 16:08)  BSA (m2): 2.02 (10-21-20 @ 16:08)      Physical Exam:  	Gen: NAD  	HEENT: PERRL, supple neck  	Pulm: CTA B/L  	CV: RRR, S1S2; no rub  	Back: No spinal or CVA tenderness; no sacral edema  	Abd: +BS, soft, nontender/nondistended  	: No suprapubic tenderness  	UE: Warm,  no edema  	LE: Warm,  no edema  	Neuro: No focal deficit  	Skin: Warm  	Vascular access: BARBRA TONEY    LABS/STUDIES  --------------------------------------------------------------------------------              10.4   5.92  >-----------<  173      [10-22-20 @ 06:04]              35.1     140  |  98  |  50.0  ----------------------------<  110      [10-22-20 @ 06:04]  4.2   |  25.0  |  5.59        Ca     9.2     [10-22-20 @ 06:04]      Mg     2.1     [10-22-20 @ 06:04]      Phos  5.0     [10-22-20 @ 06:04]    TPro  6.0  /  Alb  3.5  /  TBili  0.5  /  DBili  x   /  AST  22  /  ALT  17  /  AlkPhos  123  [10-22-20 @ 06:04]    PT/INR: PT 12.3 , INR 1.06       [10-21-20 @ 17:33]  PTT: 33.1       [10-21-20 @ 17:33]      Creatinine Trend:  SCr 5.59 [10-22 @ 06:04]  SCr 4.76 [10-21 @ 17:33]    Urinalysis - [09-15-20 @ 02:01]      Color Yellow / Appearance Clear / SG 1.010 / pH 8.0      Gluc 100 / Ketone Negative  / Bili Negative / Urobili Negative       Blood Trace / Protein 100 / Leuk Est Trace / Nitrite Negative      RBC 0-2 / WBC 0-2 / Hyaline  / Gran  / Sq Epi  / Non Sq Epi Occasional / Bacteria Occasional      Iron 53, TIBC 266, %sat 20      [08-19-20 @ 06:32]  Ferritin 184      [08-19-20 @ 06:32]  PTH -- (Ca 9.6)      [08-19-20 @ 16:08]   91  Vitamin D (25OH) 26.4      [08-19-20 @ 16:08]  HbA1c 4.9      [08-09-18 @ 05:54]  TSH 2.16      [09-15-20 @ 02:01]    HBsAb <3.0      [06-25-20 @ 02:07]  HBsAg Nonreact      [06-25-20 @ 02:07]  HBcAb Nonreact      [06-25-20 @ 02:07]  HCV 0.11, Nonreact      [06-25-20 @ 02:07]

## 2020-10-22 NOTE — PROGRESS NOTE ADULT - SUBJECTIVE AND OBJECTIVE BOX
INTERVAL HPI/OVERNIGHT EVENTS/SUBJECTIVE:  Patient was seen and examined in cath lab. s/p Right lower extremity angiogram via left groin and right DP access. Balloon angioplasty of right SFA, Popliteal, and Peroneal arteries. Popliteal stents x3. Patient offers no complaints at this time, states pain is well controlled. Laying flat x 4 hours. Noted to be hypertensive; appears that PM doses of BP meds were held last night.   Denies cp, sob, n/v/d, numbness tingling, temp changes in leg.     ICU Vital Signs Last 24 Hrs  T(C): 36.9 (22 Oct 2020 07:02), Max: 36.9 (22 Oct 2020 07:02)  T(F): 98.4 (22 Oct 2020 07:02), Max: 98.4 (22 Oct 2020 07:02)  HR: 80 (22 Oct 2020 14:45) (76 - 89)  BP: 169/71 (22 Oct 2020 14:45) (156/56 - 184/81)  BP(mean): --  ABP: --  ABP(mean): --  RR: 15 (22 Oct 2020 14:45) (15 - 19)  SpO2: 95% (22 Oct 2020 14:45) (92% - 98%)      MEDICATIONS  (STANDING):  aspirin enteric coated 81 milliGRAM(s) Oral daily  cloNIDine 0.1 milliGRAM(s) Oral at bedtime  dextrose 5%. 1000 milliLiter(s) (50 mL/Hr) IV Continuous <Continuous>  dextrose 50% Injectable 12.5 Gram(s) IV Push once  dextrose 50% Injectable 25 Gram(s) IV Push once  dextrose 50% Injectable 25 Gram(s) IV Push once  doxazosin 1 milliGRAM(s) Oral at bedtime  DULoxetine 60 milliGRAM(s) Oral daily  heparin   Injectable 5000 Unit(s) SubCutaneous every 8 hours  hydrALAZINE  Oral Tab/Cap - Peds 50 milliGRAM(s) Oral two times a day before meals  insulin lispro (ADMELOG) corrective regimen sliding scale   SubCutaneous three times a day before meals  isosorbide   mononitrate ER Tablet (IMDUR) 30 milliGRAM(s) Oral daily  NIFEdipine XL 60 milliGRAM(s) Oral at bedtime  senna 2 Tablet(s) Oral at bedtime    MEDICATIONS  (PRN):  acetaminophen   Tablet .. 650 milliGRAM(s) Oral every 6 hours PRN Mild Pain (1 - 3)  dextrose 40% Gel 15 Gram(s) Oral once PRN Blood Glucose LESS THAN 70 milliGRAM(s)/deciliter  glucagon  Injectable 1 milliGRAM(s) IntraMuscular once PRN Glucose LESS THAN 70 milligrams/deciliter  ondansetron Injectable 4 milliGRAM(s) IV Push every 6 hours PRN Nausea    MISC:     PHYSICAL EXAM:    Gen: NAD , laying flat in bed  Neurological: aaox3, non-focal  Pulmonary: non-labored, no accessory muscle use  Cardiovascular: RRR on monitor  Gastrointestinal: soft, NTND   Extremities: Left groin soft, no hematoma, dressing is c/d/i. compartments of leg are soft. + dp/ pt dopplerable signals  RLE - NV intact, WWP. + dopplerable DP/AT signal. dressings c/d/i, no hematoma. compartments of leg ar soft. no neuro/sensory deficits. + erythema/discoloration from mid foot to distal toes      LABS:  CBC Full  -  ( 22 Oct 2020 06:04 )  WBC Count : 5.92 K/uL  RBC Count : 3.67 M/uL  Hemoglobin : 10.4 g/dL  Hematocrit : 35.1 %  Platelet Count - Automated : 173 K/uL  Mean Cell Volume : 95.6 fl  Mean Cell Hemoglobin : 28.3 pg  Mean Cell Hemoglobin Concentration : 29.6 gm/dL  Auto Neutrophil # : 4.08 K/uL  Auto Lymphocyte # : 0.77 K/uL  Auto Monocyte # : 0.72 K/uL  Auto Eosinophil # : 0.24 K/uL  Auto Basophil # : 0.09 K/uL  Auto Neutrophil % : 68.9 %  Auto Lymphocyte % : 13.0 %  Auto Monocyte % : 12.2 %  Auto Eosinophil % : 4.1 %  Auto Basophil % : 1.5 %    10-22    140  |  98  |  50.0<H>  ----------------------------<  110<H>  4.2   |  25.0  |  5.59<H>    Ca    9.2      22 Oct 2020 06:04  Phos  5.0     10-22  Mg     2.1     10-22    TPro  6.0<L>  /  Alb  3.5  /  TBili  0.5  /  DBili  x   /  AST  22  /  ALT  17  /  AlkPhos  123<H>  10-22    PT/INR - ( 21 Oct 2020 17:33 )   PT: 12.3 sec;   INR: 1.06 ratio         PTT - ( 21 Oct 2020 17:33 )  PTT:33.1 sec    RECENT CULTURES:      LIVER FUNCTIONS - ( 22 Oct 2020 06:04 )  Alb: 3.5 g/dL / Pro: 6.0 g/dL / ALK PHOS: 123 U/L / ALT: 17 U/L / AST: 22 U/L / GGT: x           ASSESSMENT/PLAN:  86yMale s/p Right lower extremity angiogram via left groin and right DP access. Balloon angioplasty of right SFA, Popliteal, and Peroneal arteries. Popliteal stents x3. Doing well post-op.  -monitor hypertension - patient missed dose of BP meds last night. will monitor   -NV checks   -lay flat/bed rest 4 hours  -recieved loading dose plavix  -nephrology consult appreciated, HD tomorrow  -renal diet  -f/u hematology consult  -continue aspirin  --dvt ppx - heparin sc  -encourage ISS use

## 2020-10-23 ENCOUNTER — TRANSCRIPTION ENCOUNTER (OUTPATIENT)
Age: 85
End: 2020-10-23

## 2020-10-23 DIAGNOSIS — D64.9 ANEMIA, UNSPECIFIED: ICD-10-CM

## 2020-10-23 LAB
ACANTHOCYTES BLD QL SMEAR: SLIGHT — SIGNIFICANT CHANGE UP
ANION GAP SERPL CALC-SCNC: 19 MMOL/L — HIGH (ref 5–17)
ANISOCYTOSIS BLD QL: SLIGHT — SIGNIFICANT CHANGE UP
APTT BLD: 36.2 SEC — HIGH (ref 27.5–35.5)
APTT BLD: 37.3 SEC — HIGH (ref 27.5–35.5)
BASOPHILS # BLD AUTO: 0.28 K/UL — HIGH (ref 0–0.2)
BASOPHILS NFR BLD AUTO: 4.4 % — HIGH (ref 0–2)
BUN SERPL-MCNC: 50 MG/DL — HIGH (ref 8–20)
CALCIUM SERPL-MCNC: 9 MG/DL — SIGNIFICANT CHANGE UP (ref 8.6–10.2)
CHLORIDE SERPL-SCNC: 98 MMOL/L — SIGNIFICANT CHANGE UP (ref 98–107)
CO2 SERPL-SCNC: 22 MMOL/L — SIGNIFICANT CHANGE UP (ref 22–29)
CREAT SERPL-MCNC: 5.45 MG/DL — HIGH (ref 0.5–1.3)
DACRYOCYTES BLD QL SMEAR: SLIGHT — SIGNIFICANT CHANGE UP
ELLIPTOCYTES BLD QL SMEAR: SLIGHT — SIGNIFICANT CHANGE UP
EOSINOPHIL # BLD AUTO: 0.11 K/UL — SIGNIFICANT CHANGE UP (ref 0–0.5)
EOSINOPHIL NFR BLD AUTO: 1.7 % — SIGNIFICANT CHANGE UP (ref 0–6)
GIANT PLATELETS BLD QL SMEAR: PRESENT — SIGNIFICANT CHANGE UP
GLUCOSE SERPL-MCNC: 105 MG/DL — HIGH (ref 70–99)
HCT VFR BLD CALC: 32.3 % — LOW (ref 39–50)
HGB BLD-MCNC: 9.7 G/DL — LOW (ref 13–17)
LYMPHOCYTES # BLD AUTO: 0.22 K/UL — LOW (ref 1–3.3)
LYMPHOCYTES # BLD AUTO: 3.5 % — LOW (ref 13–44)
MACROCYTES BLD QL: SIGNIFICANT CHANGE UP
MAGNESIUM SERPL-MCNC: 2.1 MG/DL — SIGNIFICANT CHANGE UP (ref 1.6–2.6)
MANUAL SMEAR VERIFICATION: SIGNIFICANT CHANGE UP
MCHC RBC-ENTMCNC: 28.3 PG — SIGNIFICANT CHANGE UP (ref 27–34)
MCHC RBC-ENTMCNC: 30 GM/DL — LOW (ref 32–36)
MCV RBC AUTO: 94.2 FL — SIGNIFICANT CHANGE UP (ref 80–100)
MICROCYTES BLD QL: SLIGHT — SIGNIFICANT CHANGE UP
MONOCYTES # BLD AUTO: 0.55 K/UL — SIGNIFICANT CHANGE UP (ref 0–0.9)
MONOCYTES NFR BLD AUTO: 8.7 % — SIGNIFICANT CHANGE UP (ref 2–14)
NEUTROPHILS # BLD AUTO: 5.12 K/UL — SIGNIFICANT CHANGE UP (ref 1.8–7.4)
NEUTROPHILS NFR BLD AUTO: 81.7 % — HIGH (ref 43–77)
OVALOCYTES BLD QL SMEAR: SLIGHT — SIGNIFICANT CHANGE UP
PHOSPHATE SERPL-MCNC: 5 MG/DL — HIGH (ref 2.4–4.7)
PLAT MORPH BLD: ABNORMAL
PLATELET # BLD AUTO: 175 K/UL — SIGNIFICANT CHANGE UP (ref 150–400)
POIKILOCYTOSIS BLD QL AUTO: SLIGHT — SIGNIFICANT CHANGE UP
POLYCHROMASIA BLD QL SMEAR: SLIGHT — SIGNIFICANT CHANGE UP
POTASSIUM SERPL-MCNC: 4 MMOL/L — SIGNIFICANT CHANGE UP (ref 3.5–5.3)
POTASSIUM SERPL-SCNC: 4 MMOL/L — SIGNIFICANT CHANGE UP (ref 3.5–5.3)
RBC # BLD: 3.43 M/UL — LOW (ref 4.2–5.8)
RBC # FLD: 16.6 % — HIGH (ref 10.3–14.5)
RBC BLD AUTO: ABNORMAL
SODIUM SERPL-SCNC: 139 MMOL/L — SIGNIFICANT CHANGE UP (ref 135–145)
WBC # BLD: 6.27 K/UL — SIGNIFICANT CHANGE UP (ref 3.8–10.5)
WBC # FLD AUTO: 6.27 K/UL — SIGNIFICANT CHANGE UP (ref 3.8–10.5)

## 2020-10-23 PROCEDURE — 99223 1ST HOSP IP/OBS HIGH 75: CPT

## 2020-10-23 PROCEDURE — 90937 HEMODIALYSIS REPEATED EVAL: CPT

## 2020-10-23 RX ORDER — NIFEDIPINE 30 MG
90 TABLET, EXTENDED RELEASE 24 HR ORAL DAILY
Refills: 0 | Status: DISCONTINUED | OUTPATIENT
Start: 2020-10-24 | End: 2020-10-31

## 2020-10-23 RX ORDER — HYDRALAZINE HCL 50 MG
5 TABLET ORAL ONCE
Refills: 0 | Status: COMPLETED | OUTPATIENT
Start: 2020-10-23 | End: 2020-10-23

## 2020-10-23 RX ORDER — ACETAMINOPHEN 500 MG
2 TABLET ORAL
Qty: 0 | Refills: 0 | DISCHARGE
Start: 2020-10-23

## 2020-10-23 RX ORDER — TRAMADOL HYDROCHLORIDE 50 MG/1
0.5 TABLET ORAL
Qty: 20 | Refills: 0
Start: 2020-10-23

## 2020-10-23 RX ORDER — CLOPIDOGREL BISULFATE 75 MG/1
1 TABLET, FILM COATED ORAL
Qty: 30 | Refills: 0
Start: 2020-10-23

## 2020-10-23 RX ORDER — ERYTHROPOIETIN 10000 [IU]/ML
10000 INJECTION, SOLUTION INTRAVENOUS; SUBCUTANEOUS ONCE
Refills: 0 | Status: COMPLETED | OUTPATIENT
Start: 2020-10-23 | End: 2020-10-23

## 2020-10-23 RX ORDER — TRAMADOL HYDROCHLORIDE 50 MG/1
25 TABLET ORAL ONCE
Refills: 0 | Status: DISCONTINUED | OUTPATIENT
Start: 2020-10-23 | End: 2020-10-23

## 2020-10-23 RX ORDER — TRAMADOL HYDROCHLORIDE 50 MG/1
25 TABLET ORAL EVERY 6 HOURS
Refills: 0 | Status: DISCONTINUED | OUTPATIENT
Start: 2020-10-23 | End: 2020-10-24

## 2020-10-23 RX ORDER — ASPIRIN/CALCIUM CARB/MAGNESIUM 324 MG
1 TABLET ORAL
Qty: 0 | Refills: 0 | DISCHARGE

## 2020-10-23 RX ORDER — APIXABAN 2.5 MG/1
1 TABLET, FILM COATED ORAL
Qty: 60 | Refills: 0
Start: 2020-10-23

## 2020-10-23 RX ORDER — CLOPIDOGREL BISULFATE 75 MG/1
75 TABLET, FILM COATED ORAL DAILY
Refills: 0 | Status: DISCONTINUED | OUTPATIENT
Start: 2020-10-23 | End: 2020-10-31

## 2020-10-23 RX ORDER — APIXABAN 2.5 MG/1
2.5 TABLET, FILM COATED ORAL
Refills: 0 | Status: COMPLETED | OUTPATIENT
Start: 2020-10-23 | End: 2020-10-27

## 2020-10-23 RX ADMIN — TRAMADOL HYDROCHLORIDE 25 MILLIGRAM(S): 50 TABLET ORAL at 21:37

## 2020-10-23 RX ADMIN — APIXABAN 2.5 MILLIGRAM(S): 2.5 TABLET, FILM COATED ORAL at 17:11

## 2020-10-23 RX ADMIN — APIXABAN 2.5 MILLIGRAM(S): 2.5 TABLET, FILM COATED ORAL at 12:30

## 2020-10-23 RX ADMIN — HEPARIN SODIUM 1500 UNIT(S)/HR: 5000 INJECTION INTRAVENOUS; SUBCUTANEOUS at 08:06

## 2020-10-23 RX ADMIN — ISOSORBIDE MONONITRATE 30 MILLIGRAM(S): 60 TABLET, EXTENDED RELEASE ORAL at 12:30

## 2020-10-23 RX ADMIN — TRAMADOL HYDROCHLORIDE 25 MILLIGRAM(S): 50 TABLET ORAL at 22:37

## 2020-10-23 RX ADMIN — SENNA PLUS 2 TABLET(S): 8.6 TABLET ORAL at 21:36

## 2020-10-23 RX ADMIN — Medication 1 MILLIGRAM(S): at 21:37

## 2020-10-23 RX ADMIN — DULOXETINE HYDROCHLORIDE 60 MILLIGRAM(S): 30 CAPSULE, DELAYED RELEASE ORAL at 12:30

## 2020-10-23 RX ADMIN — Medication 50 MILLIGRAM(S): at 17:11

## 2020-10-23 RX ADMIN — Medication 50 MILLIGRAM(S): at 05:50

## 2020-10-23 RX ADMIN — CLOPIDOGREL BISULFATE 75 MILLIGRAM(S): 75 TABLET, FILM COATED ORAL at 12:32

## 2020-10-23 RX ADMIN — Medication 0.1 MILLIGRAM(S): at 21:38

## 2020-10-23 RX ADMIN — ERYTHROPOIETIN 10000 UNIT(S): 10000 INJECTION, SOLUTION INTRAVENOUS; SUBCUTANEOUS at 10:17

## 2020-10-23 RX ADMIN — TRAMADOL HYDROCHLORIDE 25 MILLIGRAM(S): 50 TABLET ORAL at 09:06

## 2020-10-23 RX ADMIN — TRAMADOL HYDROCHLORIDE 25 MILLIGRAM(S): 50 TABLET ORAL at 10:05

## 2020-10-23 RX ADMIN — TRAMADOL HYDROCHLORIDE 25 MILLIGRAM(S): 50 TABLET ORAL at 15:53

## 2020-10-23 RX ADMIN — TRAMADOL HYDROCHLORIDE 25 MILLIGRAM(S): 50 TABLET ORAL at 15:17

## 2020-10-23 RX ADMIN — Medication 5 MILLIGRAM(S): at 23:33

## 2020-10-23 RX ADMIN — Medication 60 MILLIGRAM(S): at 21:36

## 2020-10-23 NOTE — DISCHARGE NOTE PROVIDER - NSDCCPTREATMENT_GEN_ALL_CORE_FT
PRINCIPAL PROCEDURE  Procedure: Insertion, stent, artery, popliteal  Findings and Treatment:       SECONDARY PROCEDURE  Procedure: Angioplasty of right superficial femoral artery  Findings and Treatment:      PRINCIPAL PROCEDURE  Procedure: Insertion, stent, artery, popliteal  Findings and Treatment:       SECONDARY PROCEDURE  Procedure: Implantation of leadless pacemaker  Findings and Treatment:     Procedure: Angioplasty of right superficial femoral artery  Findings and Treatment:

## 2020-10-23 NOTE — DISCHARGE NOTE PROVIDER - PROVIDER TOKENS
PROVIDER:[TOKEN:[[id number]],FOLLOWUP:[1 week],ESTABLISHEDPATIENT:[T]],PROVIDER:[TOKEN:[[id number]],FOLLOWUP:[1 week],ESTABLISHEDPATIENT:[T]],PROVIDER:[TOKEN:[[id number]],FOLLOWUP:[1-3 days],ESTABLISHEDPATIENT:[T]] PROVIDER:[TOKEN:[[id number]],FOLLOWUP:[1 week],ESTABLISHEDPATIENT:[T]],PROVIDER:[TOKEN:[[id number]],FOLLOWUP:[1 week],ESTABLISHEDPATIENT:[T]],PROVIDER:[TOKEN:[[id number]],FOLLOWUP:[1-3 days],ESTABLISHEDPATIENT:[T]],PROVIDER:[TOKEN:[[id number]]]

## 2020-10-23 NOTE — DISCHARGE NOTE PROVIDER - CARE PROVIDER_API CALL
Siva Winston)  Surgery; Vascular Surgery  [address] [facility], [address]st [place] [address]  Phone: [phone]  Fax: [phone]  Established Patient  Follow Up Time: 1 week    Stephon Baker)  Hematology; Internal Medicine; Medical Oncology  [address] [place] [address]  Phone: [phone]  Fax: [phone]  Established Patient  Follow Up Time: 1 week    Bay Avendano  INTERNAL MEDICINE  [address] [place] [address]  Phone: [phone]  Fax: [phone]  Established Patient  Follow Up Time: 1-3 days   Siva Winston)  Surgery; Vascular Surgery  [address] [facility], [address]st [place] [address]  Phone: [phone]  Fax: [phone]  Established Patient  Follow Up Time: 1 week    Stephon Baker)  Hematology; Internal Medicine; Medical Oncology  [address] [place] [address]  Phone: [phone]  Fax: [phone]  Established Patient  Follow Up Time: 1 week    Bay Avendano  INTERNAL MEDICINE  [address] [place] [address]  Phone: [phone]  Fax: [phone]  Established Patient  Follow Up Time: 1-3 days    Santi Palma  CARDIOLOGY  [address] [facility], [address]  [place] [address]  Phone: [phone]  Fax: [phone]  Follow Up Time:

## 2020-10-23 NOTE — DISCHARGE NOTE NURSING/CASE MANAGEMENT/SOCIAL WORK - PATIENT PORTAL LINK FT
You can access the FollowMyHealth Patient Portal offered by [facility] by registering at the following website: [email or web address]. By joining [facility]’s FollowMyHealth portal, you will also be able to view your health information using other applications (apps) compatible with our system.

## 2020-10-23 NOTE — PROGRESS NOTE ADULT - SUBJECTIVE AND OBJECTIVE BOX
Patient was seen and evaluated on dialysis.   Patient is tolerating the procedure well.   T(C): 36.9 (10-23-20 @ 11:45), Max: 37.7 (10-23-20 @ 07:26)  HR: 88 (10-23-20 @ 12:27) (76 - 97)  BP: 175/72 (10-23-20 @ 12:27) (152/64 - 178/77)  Continue dialysis:   Dialyzer:    revaclear 300      QB:  400  QD: 500ml.,  Goal UF 1 kg over 3 Hours

## 2020-10-23 NOTE — DISCHARGE NOTE PROVIDER - NSDCFUSCHEDAPPT_GEN_ALL_CORE_FT
PROETTA, CHRISTIANO ; 10/26/2020 ; NPP [facility] [address]  PROETTA, CHRISTIANO ; 10/29/2020 ; [facility]  PROETTA, CHRISTIANO ; 10/29/2020 ; [facility]  PROETTA, CHRISTIANO ; 10/29/2020 ; [facility]  PROETTA, CHRISTIANO ; 11/05/2020 ; [facility]  PROETTA, CHRISTIANO ; 11/12/2020 ; [facility]  PROETTA, CHRISTIANO ; 11/12/2020 ; [facility]  PROETTA, CHRISTIANO ; 11/19/2020 ; [facility]  PROETTA, CHRISTIANO ; 11/25/2020 ; [facility]  PROETTA, CHRISTIANO ; 11/25/2020 ; [facility]  PROETTA, CHRISTIANO ; 11/30/2020 ; NPP [facility] [address]  PROETTA, CHRISTIANO ; 12/03/2020 ; [facility]  PROETTA, CHRISTIANO ; 12/10/2020 ; [facility]  PROETTA, CHRISTIANO ; 12/10/2020 ; [facility]  PROETTA, CHRISTIANO ; 12/17/2020 ; [facility]  PROETTA, CHRISTIANO ; 12/22/2020 ; P Neurology [address]  PROETTA, CHRISTIANO ; 01/04/2021 ; NPP [facility] [address] PROETTA, CHRISTIANO ; 10/29/2020 ; [facility]  PROETTA, CHRISTIANO ; 10/29/2020 ; [facility]  PROETTA, CHRISTIANO ; 10/29/2020 ; [facility]  PROETTA, CHRISTIANO ; 11/05/2020 ; [facility]  PROETTA, CHRISTIANO ; 11/12/2020 ; [facility]  PROETTA, CHRISTIANO ; 11/12/2020 ; [facility]  PROETTA, CHRISTIANO ; 11/19/2020 ; [facility]  PROETTA, CHRISTIANO ; 11/25/2020 ; [facility]  PROETTA, CHRISTIANO ; 11/25/2020 ; [facility]  PROETTA, CHRISTIANO ; 11/30/2020 ; NPP [facility] [address]  PROETTA, CHRISTIANO ; 12/03/2020 ; [facility]  PROETTA, CHRISTIANO ; 12/10/2020 ; [facility]  PROETTA, CHRISTIANO ; 12/10/2020 ; [facility]  PROETTA, CHRISTIANO ; 12/17/2020 ; [facility]  PROETTA, CHRISTIANO ; 12/22/2020 ; NPP Neurology [address]  PROETTA, CHRISTIANO ; 01/04/2021 ; NPP [facility] [address] PROETTA, CHRISTIANO ; 11/05/2020 ; [facility]  PROETTA, CHRISTIANO ; 11/12/2020 ; [facility]  PROETTA, CHRISTIANO ; 11/12/2020 ; [facility]  PROETTA, CHRISTIANO ; 11/19/2020 ; [facility]  PROETTA, CHRISTIANO ; 11/25/2020 ; [facility]  PROETTA, CHRISTIANO ; 11/25/2020 ; [facility]  PROETTA, CHRISTIANO ; 11/30/2020 ; NPP [facility] [address]  PROETTA, CHRISTIANO ; 12/03/2020 ; [facility]  PROETTA, CHRISTIANO ; 12/10/2020 ; [facility]  PROETTA, CHRISTIANO ; 12/10/2020 ; [facility]  PROETTA, CHRISTIANO ; 12/17/2020 ; [facility]  PROETTA, CHRISTIANO ; 12/22/2020 ; NPP Neurology [address]  PROETTA, CHRITSIANO ; 01/04/2021 ; NPP [facility] [address]

## 2020-10-23 NOTE — CHART NOTE - NSCHARTNOTEFT_GEN_A_CORE
called by RN because could not locate DP  pulses. Explained to RN that patient has not had DP pulses since yesterday evening and this is not a new finding. Patient with dopplerable AT and PT throughout course of day.    Examined patient at bedside. Stable vascular exam. Dopplerable R AT and R PT.

## 2020-10-23 NOTE — DISCHARGE NOTE PROVIDER - CARE PROVIDERS DIRECT ADDRESSES
,[email or web address] [email or web address],[email or web address],[email or web address]

## 2020-10-23 NOTE — DISCHARGE NOTE PROVIDER - NSDCMRMEDTOKEN_GEN_ALL_CORE_FT
acetaminophen 325 mg oral tablet: 2 tab(s) orally every 6 hours, As needed, Mild Pain (1 - 3)  apixaban 2.5 mg oral tablet: 1 tab(s) orally 2 times a day  atorvastatin 80 mg oral tablet: 1 tab(s) orally once a day  cloNIDine 0.1 mg oral tablet: orally once a day (at bedtime)  clopidogrel 75 mg oral tablet: 1 tab(s) orally once a day  Cymbalta 60 mg oral delayed release capsule: 1 cap(s) orally once a day  doxazosin 1 mg oral tablet: 1 tab(s) orally once a day (at bedtime)  hydrALAZINE 50 mg oral tablet: 1 tab(s) orally 2 times a day (before meals)  isosorbide mononitrate 30 mg oral tablet, extended release: 1 tab(s) orally once a day  Nephro-Thomas oral tablet: 1 tab(s) orally once a day  NIFEdipine 60 mg oral tablet, extended release: 1 tab(s) orally once (at bedtime)  NIFEdipine 90 mg oral tablet, extended release: 1 tab(s) orally once a day   Protonix 40 mg oral delayed release tablet: 1 tab(s) orally once a day   torsemide 20 mg oral tablet: 1 tab(s) orally once a day  traMADol 50 mg oral tablet: 0.5 tab(s) orally every 6 hours, As Needed -for moderate pain MDD:4   acetaminophen 325 mg oral tablet: 2 tab(s) orally every 6 hours, As needed, Mild Pain (1 - 3)  apixaban 2.5 mg oral tablet: 1 tab(s) orally 2 times a day  atorvastatin 80 mg oral tablet: 1 tab(s) orally once a day  cloNIDine 0.1 mg oral tablet: orally once a day (at bedtime)  clopidogrel 75 mg oral tablet: 1 tab(s) orally once a day  Cymbalta 60 mg oral delayed release capsule: 1 cap(s) orally once a day  doxazosin 1 mg oral tablet: 1 tab(s) orally once a day (at bedtime)  hydrALAZINE 50 mg oral tablet: 1 tab(s) orally 2 times a day (before meals)  ibuprofen 400 mg oral tablet: 1 tab(s) orally every 8 hours, As needed, Mild Pain (1 - 3)  isosorbide mononitrate 30 mg oral tablet, extended release: 1 tab(s) orally once a day  Nephro-Thomas oral tablet: 1 tab(s) orally once a day  NIFEdipine 60 mg oral tablet, extended release: 1 tab(s) orally once (at bedtime)  NIFEdipine 90 mg oral tablet, extended release: 1 tab(s) orally once a day   Protonix 40 mg oral delayed release tablet: 1 tab(s) orally once a day   sevelamer carbonate 800 mg oral tablet: 1 tab(s) orally 3 times a day (with meals)  torsemide 20 mg oral tablet: 1 tab(s) orally once a day  traMADol 50 mg oral tablet: 0.5 tab(s) orally every 6 hours, As Needed -for moderate pain MDD:4   acetaminophen 325 mg oral tablet: 2 tab(s) orally every 6 hours, As needed, Mild Pain (1 - 3)  apixaban 2.5 mg oral tablet: 1 tab(s) orally 2 times a day  apixaban 2.5 mg oral tablet: 1 tab(s) orally every 12 hours  atorvastatin 80 mg oral tablet: 1 tab(s) orally once a day  cloNIDine 0.1 mg oral tablet: orally once a day (at bedtime)  clopidogrel 75 mg oral tablet: 1 tab(s) orally once a day  Cymbalta 60 mg oral delayed release capsule: 1 cap(s) orally once a day  doxazosin 1 mg oral tablet: 1 tab(s) orally once a day (at bedtime)  hydrALAZINE 50 mg oral tablet: 1 tab(s) orally 2 times a day (before meals)  ibuprofen 600 mg oral tablet: 1 tab(s) orally every 8 hours, As needed, Moderate Pain (4 - 6)  isosorbide mononitrate 30 mg oral tablet, extended release: 1 tab(s) orally once a day  Nephro-Thomas oral tablet: 1 tab(s) orally once a day  NIFEdipine 60 mg oral tablet, extended release: 1 tab(s) orally once (at bedtime)  NIFEdipine 90 mg oral tablet, extended release: 1 tab(s) orally once a day   Protonix 40 mg oral delayed release tablet: 1 tab(s) orally once a day   sevelamer carbonate 800 mg oral tablet: 1 tab(s) orally 3 times a day (with meals)  torsemide 20 mg oral tablet: 1 tab(s) orally once a day  traMADol 50 mg oral tablet: 0.5 tab(s) orally every 6 hours, As Needed -for moderate pain MDD:4  traMADol 50 mg oral tablet: 0.5 tab(s) orally every 6 hours, As Needed -for moderate pain MDD:2 full tabs

## 2020-10-23 NOTE — DISCHARGE NOTE PROVIDER - NSDCFUADDINST_GEN_ALL_CORE_FT
Follow up with Dr. Palma in 2-3 weeks for pacemaker check. Our office will contact you in 3-5 days to schedule this appointment. Please call [phone] with questions or concerns.

## 2020-10-23 NOTE — CONSULT NOTE ADULT - PROBLEM SELECTOR RECOMMENDATION 9
Acute on chronic anemia, felt most likely to be s/t stage IV CKD  -has baseline anemia secondary to CKD, h/o ulcerated polyps and colonic AVMs.   -on aranesp in the outpatient setting as well as aspirin/Brilinta  -can be in setting of GI losses,  s/p in 09/20 he was transfused 2 units of prbc with improved hemoglobin - stable since 09/20 admit.      Plan:  Monitor daily CBC   Maintain Hgb > 7 g/dl  Check FOBT  If Hb were to drop below 8 gm/dL, can check CT abd/pelvis w/o contrast to r/o retroperitoneal bleed as he is on ASA and Brilinta  Follow up outpt with hematology for continued Aranesp Q2 weeks with Dr. Baker

## 2020-10-23 NOTE — DISCHARGE NOTE PROVIDER - HOSPITAL COURSE
87 yo M w/ PAD s/p RLE angiogram with angioplasty in june 2020 presents w/ persistent RLE rest pain. describes pain as shooting pain in his right foot. no alleviating factors. exacerbated with prolonged standing or walking. Pt admitted to vascular surgery service and was taken to the cath lab on 10/22/20 by Dr Barbara Sorenson where he underwent a RLE angio with SFA and peroneal angioplasty and angioplasty and stent x 3 of R Pop. The pt tolerated the procedure well and had AT signals at end of case. Pt with h/o ? rash allergy to Plavix and ?h/o anemia caused by Brilinta. Heme consult called. Decision made to give Plavix load which pt tolerated. Overnight he had increased pain and difficulty finding AT signal on exam. Possible spasm vs small vessel and heparin gtt was started. This improved the pt exam and foot was warm. He continued to have int pain and Tramadol improved his complaints. He had HD on POD #1 and his heparin gtt was switched to Eliquis. He was seen by PT   87 yo M w/ PAD s/p RLE angiogram with angioplasty in june 2020 presents w/ persistent RLE rest pain. describes pain as shooting pain in his right foot. no alleviating factors. exacerbated with prolonged standing or walking. Pt admitted to vascular surgery service and was taken to the cath lab on 10/22/20 by Dr Barbara Sorenson where he underwent a RLE angio with SFA and peroneal angioplasty and angioplasty and stent x 3 of R Pop. The pt tolerated the procedure well and had AT signals at end of case. Pt with h/o ? rash allergy to Plavix and ?h/o anemia caused by Brilinta. Heme consult called. Decision made to give Plavix load which pt tolerated. Overnight he had increased pain and difficulty finding AT signal on exam. Possible spasm vs small vessel and heparin gtt was started. This improved the pt exam and foot was warm. He continued to have int pain and Tramadol improved his complaints. He had HD on POD #1 and his heparin gtt was switched to Eliquis. He was seen by PT, which recommended disposition to home with assistance and outpatient PT. As the patient was otherwise clinically stable, he was discharged to home on 10/24/2020.   85 yo M w/ PAD s/p RLE angiogram with angioplasty in june 2020 presents w/ persistent RLE rest pain. describes pain as shooting pain in his right foot. no alleviating factors. exacerbated with prolonged standing or walking. Pt admitted to vascular surgery service and was taken to the cath lab on 10/22/20 by Dr Barbara Sorenson where he underwent a RLE angio with SFA and peroneal angioplasty and angioplasty and stent x 3 of R Pop. The pt tolerated the procedure well and had AT signals at end of case. Pt with h/o ? rash allergy to Plavix and ?h/o anemia caused by Brilinta. Heme consult called. Decision made to give Plavix load which pt tolerated. Overnight he had increased pain and difficulty finding AT signal on exam. Possible spasm vs small vessel and heparin gtt was started. This improved the pt exam and foot was warm. He continued to have int pain and Tramadol improved his complaints. He had HD on POD #1 and his heparin gtt was switched to Eliquis. He was seen by PT, which recommended disposition to home with assistance and outpatient PT. As the patient was otherwise clinically stable, he was discharged to home on 10/27/2020.   85 yo M w/ PAD s/p RLE angiogram with angioplasty in june 2020 presents w/ persistent RLE rest pain. describes pain as shooting pain in his right foot. no alleviating factors. exacerbated with prolonged standing or walking. Pt admitted to vascular surgery service and was taken to the cath lab on 10/22/20 by Dr Barbara Sorenson where he underwent a RLE angio with SFA and peroneal angioplasty and angioplasty and stent x 3 of R Pop. The pt tolerated the procedure well and had AT signals at end of case. Pt with h/o ? rash allergy to Plavix and ?h/o anemia caused by Brilinta. Heme consult called. Decision made to give Plavix load which pt tolerated. Overnight he had increased pain and difficulty finding AT signal on exam. Possible spasm vs small vessel and heparin gtt was started. This improved the pt exam and foot was warm. He continued to have int pain and Tramadol improved his complaints. He had HD on POD #1 and his heparin gtt was switched to Eliquis. He was seen by PT, which recommended disposition to home with assistance and outpatient PT. On 10/28/20 pt was a rapid response after he had a 12 sec pause recorded on telemetry and a syncopal episode. He was transferred to [facility] where a transvenous pacemaker was placed. He was seen by EP and deemed a candidate for a PPM. He was taken by EP on 10/28/20 and had a leadless PPM placed (MDT Vern[place] PPM ). He tolerated the procedure well and was downgraded to the telemetry floor for post procedure monitoring. POD # 1 he was doing well with continued c/o pain in R foot and R arterial duplex revealed patent adequate flow to RLE. Pt has small vessel disease in R foot and will be managed medically. The patient was is clinically stable, he was discharged to home on 10/31/2020.

## 2020-10-23 NOTE — DISCHARGE NOTE PROVIDER - NSDCCPCAREPLAN_GEN_ALL_CORE_FT
PRINCIPAL DISCHARGE DIAGNOSIS  Diagnosis: PAD (peripheral artery disease)  Assessment and Plan of Treatment:       SECONDARY DISCHARGE DIAGNOSES  Diagnosis: BPH (benign prostatic hyperplasia)  Assessment and Plan of Treatment:     Diagnosis: ESRD on dialysis  Assessment and Plan of Treatment:     Diagnosis: HLD (hyperlipidemia)  Assessment and Plan of Treatment:     Diagnosis: HTN (hypertension)  Assessment and Plan of Treatment:     Diagnosis: Vascular disease  Assessment and Plan of Treatment:      PRINCIPAL DISCHARGE DIAGNOSIS  Diagnosis: PAD (peripheral artery disease)  Assessment and Plan of Treatment:       SECONDARY DISCHARGE DIAGNOSES  Diagnosis: CHB (complete heart block)  Assessment and Plan of Treatment: - Bruising at the groin, sometimes extending down the leg, and/or a small lump under the skin at the groin access site is normal and will resolve within 2 – 3 weeks.   - You may walk and take stairs at a regular pace.   - Do not perform any exercise more strenuous than walking for 1 week.   - Do not strain or lift heavy objects for 1 week.  - You may shower the day after the procedure.  - Do not soak in water (such as tub baths, hot tubs, swimming, etc.) for 1 week.   - You may resume all other activities the day after the procedure.  Call your doctor if:   - you notice bleeding, redness, drainage, swelling, increased tenderness or a hot sensation around the catheter insertion site.   - your temperature is greater than 100 degrees F for more than 24 hours.  - you have any questions or concerns regarding the procedure.  If significant bleeding and/or a large lump (the size of a golf ball or bigger) occurs:  - Lie flat and apply continuous direct pressure just above the puncture site for at least 10 minutes  - If the issue resolves, notify your physician immediately.    - If the bleeding cannot be controlled, please seek immediate medical attention.  If you experience increased difficulty breathing or chest pain, or if you faint or have dizzy spells, please seek immediate medical attention.    Diagnosis: BPH (benign prostatic hyperplasia)  Assessment and Plan of Treatment:     Diagnosis: ESRD on dialysis  Assessment and Plan of Treatment:     Diagnosis: HLD (hyperlipidemia)  Assessment and Plan of Treatment:     Diagnosis: HTN (hypertension)  Assessment and Plan of Treatment:     Diagnosis: Vascular disease  Assessment and Plan of Treatment:

## 2020-10-23 NOTE — CONSULT NOTE ADULT - ASSESSMENT
85 yo M with recent TAVR 8/21/2020 with Dr. Lilly, [facility] of ESRD on HD, chronic anemia, HFpEF, HTN, AS, prior GI bleed from ulcerated polyps and colonic AVMs presented to admitted with RLE pain and chronic anemia.

## 2020-10-23 NOTE — PHYSICAL THERAPY INITIAL EVALUATION ADULT - ADDITIONAL COMMENTS
Pt lives in a private home with his spouse (in 80's, unable to assist) 3 steps to enter with handrails, no steps inside. Pt was independent PTA with RW. Pt owns RW and w/c.

## 2020-10-23 NOTE — PROGRESS NOTE ADULT - SUBJECTIVE AND OBJECTIVE BOX
Patient is a 86y old  Male who presents with a chief complaint of Hg 4 (23 Oct 2020 08:30)    Pt is S/P RLE angio/SFA angioplasty/peroneal angioplasty and pop stents x 3                                 POD# 1  Overnight pt with increased pain and signal abnormalities. Started on heparin gtt with improved exam. Conts to have intermittent pain and was given tramadol with improvement. Seen in HD and had no c/o CP, SOB, N/V    Vital Signs Last 24 Hrs  T(C): 37.1 (23 Oct 2020 10:45), Max: 37.7 (23 Oct 2020 07:26)  T(F): 98.7 (23 Oct 2020 10:45), Max: 99.8 (23 Oct 2020 07:26)  HR: 84 (23 Oct 2020 10:45) (76 - 97)  BP: 160/69 (23 Oct 2020 10:45) (152/64 - 184/81)  BP(mean): --  RR: 18 (23 Oct 2020 10:45) (15 - 18)  SpO2: 93% (23 Oct 2020 10:45) (90% - 98%)  I&O's Detail    22 Oct 2020 07:01  -  23 Oct 2020 07:00  --------------------------------------------------------  IN:  Total IN: 0 mL    OUT:    Voided (mL): 75 mL  Total OUT: 75 mL    Total NET: -75 mL      23 Oct 2020 07:01  -  23 Oct 2020 12:07  --------------------------------------------------------  IN:    Other (mL): 1000 mL  Total IN: 1000 mL    OUT:  Total OUT: 0 mL    Total NET: 1000 mL    MEDICATIONS  (STANDING):  Heparin gtt  ceFAZolin   IVPB 2000 milliGRAM(s) IV Intermittent once  cloNIDine 0.1 milliGRAM(s) Oral at bedtime  clopidogrel Tablet 75 milliGRAM(s) Oral daily  dextrose 5%. 1000 milliLiter(s) (50 mL/Hr) IV Continuous <Continuous>  dextrose 50% Injectable 12.5 Gram(s) IV Push once  dextrose 50% Injectable 25 Gram(s) IV Push once  dextrose 50% Injectable 25 Gram(s) IV Push once  doxazosin 1 milliGRAM(s) Oral at bedtime  DULoxetine 60 milliGRAM(s) Oral daily  hydrALAZINE 50 milliGRAM(s) Oral two times a day  insulin lispro (ADMELOG) corrective regimen sliding scale   SubCutaneous three times a day before meals  isosorbide   mononitrate ER Tablet (IMDUR) 30 milliGRAM(s) Oral daily  NIFEdipine XL 60 milliGRAM(s) Oral at bedtime  senna 2 Tablet(s) Oral at bedtime    MEDICATIONS  (PRN):  acetaminophen   Tablet .. 650 milliGRAM(s) Oral every 6 hours PRN Mild Pain (1 - 3)  dextrose 40% Gel 15 Gram(s) Oral once PRN Blood Glucose LESS THAN 70 milliGRAM(s)/deciliter  glucagon  Injectable 1 milliGRAM(s) IntraMuscular once PRN Glucose LESS THAN 70 milligrams/deciliter  ondansetron Injectable 4 milliGRAM(s) IV Push every 6 hours PRN Nausea    PAST MEDICAL & SURGICAL HISTORY:  GI bleeding due to ulcerated polyps and colonic AVMs  Aortic stenosis - s/p valve replacement  ESRD on dialysis HD on Mondays and Fridays  Risk factors for obstructive sleep apnea  Anemia  RICHARDS (dyspnea on exertion)  VT (ventricular tachycardia)  HTN (hypertension)  CAD (coronary artery disease)  AV block, 1st degree  DM (diabetes mellitus)- resolved  S/P TAVR (transcatheter aortic valve replacement)  H/O R carotid endarterectomy  A-V fistula left arm 5/2017  H/O left knee surgery  H/O circumcision at  age  65    Physical Exam:  General: NAD, resting comfortably in bed  Pulmonary: Nonlabored breathing, no respiratory distress, CTA  Cardiovascular: Normal S1, S2  Abdominal: soft, NT/ND  Extremities: L groin with sm 2x2 cm hematoma without ecchymosis, LLE WWP. RLE warm, R foot with rubor and cap refill 3 sec, toes cool, sensory and motor intact  + AT signal      LABS:                        9.7    6.27  )-----------( 175      ( 23 Oct 2020 06:26 )             32.3     10-23    139  |  98  |  50.0<H>  ----------------------------<  105<H>  4.0   |  22.0  |  5.45<H>    Ca    9.0      23 Oct 2020 06:26  Phos  5.0     10-23  Mg     2.1     10-23    TPro  6.0<L>  /  Alb  3.5  /  TBili  0.5  /  DBili  x   /  AST  22  /  ALT  17  /  AlkPhos  123<H>  10-22    PT/INR - ( 21 Oct 2020 17:33 )   PT: 12.3 sec;   INR: 1.06 ratio    PTT - ( 23 Oct 2020 06:36 )  PTT:37.3 sec    CAPILLARY BLOOD GLUCOSE  POCT Blood Glucose.: 112 mg/dL (23 Oct 2020 08:18)  POCT Blood Glucose.: 129 mg/dL (22 Oct 2020 21:16)  POCT Blood Glucose.: 124 mg/dL (22 Oct 2020 15:35)  POCT Blood Glucose.: 119 mg/dL (22 Oct 2020 12:11)      Radiology and Additional Studies:    Assessment:86yMaleHPI:  85 yo M w/ PAD s/p RLE angiogram with angioplasty in june 2020 presents w/ persistent RLE pain. describes pain as shooting pain in his right foot. no alleviating factors. exacerbated with prolonged standing or walking. Patient had angiogram with CO2 in june which did not significantly helping. denies other associate symptoms including f/c/sob/n/v/ha/chest pain.  (21 Oct 2020 17:57)  S/P RLE angio/SFA angioplasty/peroneal angioplasty and pop stents x 3                                 POD# 1  Loss of signals overnight; improved with AC    Plan:  Will DC heparin gtt and start Eliquis and cont Plavix  Cont Statin  OOB/Ambulate/PT  DM management  Pain management   Epo as per renal with HD  Possible dc home in am

## 2020-10-24 LAB
HCT VFR BLD CALC: 33.1 % — LOW (ref 39–50)
HGB BLD-MCNC: 9.7 G/DL — LOW (ref 13–17)
MCHC RBC-ENTMCNC: 28 PG — SIGNIFICANT CHANGE UP (ref 27–34)
MCHC RBC-ENTMCNC: 29.3 GM/DL — LOW (ref 32–36)
MCV RBC AUTO: 95.4 FL — SIGNIFICANT CHANGE UP (ref 80–100)
PLATELET # BLD AUTO: 165 K/UL — SIGNIFICANT CHANGE UP (ref 150–400)
RBC # BLD: 3.47 M/UL — LOW (ref 4.2–5.8)
RBC # FLD: 16.6 % — HIGH (ref 10.3–14.5)
WBC # BLD: 7 K/UL — SIGNIFICANT CHANGE UP (ref 3.8–10.5)
WBC # FLD AUTO: 7 K/UL — SIGNIFICANT CHANGE UP (ref 3.8–10.5)

## 2020-10-24 PROCEDURE — 90937 HEMODIALYSIS REPEATED EVAL: CPT

## 2020-10-24 RX ORDER — TRAMADOL HYDROCHLORIDE 50 MG/1
25 TABLET ORAL EVERY 8 HOURS
Refills: 0 | Status: DISCONTINUED | OUTPATIENT
Start: 2020-10-24 | End: 2020-10-27

## 2020-10-24 RX ADMIN — Medication 60 MILLIGRAM(S): at 21:17

## 2020-10-24 RX ADMIN — Medication 2: at 17:30

## 2020-10-24 RX ADMIN — APIXABAN 2.5 MILLIGRAM(S): 2.5 TABLET, FILM COATED ORAL at 17:09

## 2020-10-24 RX ADMIN — DULOXETINE HYDROCHLORIDE 60 MILLIGRAM(S): 30 CAPSULE, DELAYED RELEASE ORAL at 12:04

## 2020-10-24 RX ADMIN — Medication 0.1 MILLIGRAM(S): at 21:17

## 2020-10-24 RX ADMIN — Medication 650 MILLIGRAM(S): at 13:05

## 2020-10-24 RX ADMIN — SENNA PLUS 2 TABLET(S): 8.6 TABLET ORAL at 21:17

## 2020-10-24 RX ADMIN — APIXABAN 2.5 MILLIGRAM(S): 2.5 TABLET, FILM COATED ORAL at 06:22

## 2020-10-24 RX ADMIN — Medication 90 MILLIGRAM(S): at 06:22

## 2020-10-24 RX ADMIN — TRAMADOL HYDROCHLORIDE 25 MILLIGRAM(S): 50 TABLET ORAL at 10:15

## 2020-10-24 RX ADMIN — Medication 650 MILLIGRAM(S): at 12:05

## 2020-10-24 RX ADMIN — Medication 1 MILLIGRAM(S): at 21:17

## 2020-10-24 RX ADMIN — Medication 50 MILLIGRAM(S): at 06:22

## 2020-10-24 RX ADMIN — CLOPIDOGREL BISULFATE 75 MILLIGRAM(S): 75 TABLET, FILM COATED ORAL at 12:03

## 2020-10-24 RX ADMIN — TRAMADOL HYDROCHLORIDE 25 MILLIGRAM(S): 50 TABLET ORAL at 11:25

## 2020-10-24 RX ADMIN — ISOSORBIDE MONONITRATE 30 MILLIGRAM(S): 60 TABLET, EXTENDED RELEASE ORAL at 12:03

## 2020-10-24 NOTE — PROGRESS NOTE ADULT - SUBJECTIVE AND OBJECTIVE BOX
[facility] DIVISION OF KIDNEY DISEASES AND HYPERTENSION -- HEMODIALYSIS NOTE  --------------------------------------------------------------------------------  Chief Complaint: ESRD/Ongoing hemodialysis requirement    24 hour events/subjective:  s/p HD yesterday, tolerated well        PAST HISTORY  --------------------------------------------------------------------------------  No significant changes to PMH, PSH, FHx, SHx, unless otherwise noted    ALLERGIES & MEDICATIONS  --------------------------------------------------------------------------------  Allergies    Plavix (Hives)  Toprol-XL (Rash)    Intolerances      Standing Inpatient Medications  apixaban 2.5 milliGRAM(s) Oral two times a day  ceFAZolin   IVPB 2000 milliGRAM(s) IV Intermittent once  cloNIDine 0.1 milliGRAM(s) Oral at bedtime  clopidogrel Tablet 75 milliGRAM(s) Oral daily  dextrose 5%. 1000 milliLiter(s) IV Continuous <Continuous>  dextrose 50% Injectable 12.5 Gram(s) IV Push once  dextrose 50% Injectable 25 Gram(s) IV Push once  dextrose 50% Injectable 25 Gram(s) IV Push once  doxazosin 1 milliGRAM(s) Oral at bedtime  DULoxetine 60 milliGRAM(s) Oral daily  hydrALAZINE 50 milliGRAM(s) Oral two times a day  insulin lispro (ADMELOG) corrective regimen sliding scale   SubCutaneous three times a day before meals  isosorbide   mononitrate ER Tablet (IMDUR) 30 milliGRAM(s) Oral daily  NIFEdipine XL 60 milliGRAM(s) Oral at bedtime  NIFEdipine XL 90 milliGRAM(s) Oral daily  senna 2 Tablet(s) Oral at bedtime    PRN Inpatient Medications  acetaminophen   Tablet .. 650 milliGRAM(s) Oral every 6 hours PRN  dextrose 40% Gel 15 Gram(s) Oral once PRN  glucagon  Injectable 1 milliGRAM(s) IntraMuscular once PRN  ondansetron Injectable 4 milliGRAM(s) IV Push every 6 hours PRN  traMADol 25 milliGRAM(s) Oral every 6 hours PRN      REVIEW OF SYSTEMS  --------------------------------------------------------------------------------  Gen: No weight changes, fatigue, fevers/chills, weakness  Skin: No rashes  Head/Eyes/Ears/Mouth: No headache  Respiratory: No dyspnea, cough,  CV: No chest pain, orthopnea  GI: No abdominal pain, diarrhea, constipation, nausea, vomiting,  MSK: No joint pain  Neuro: No dizziness/lightheadedness, weakness  Heme: No bleeding  Psych: No significant nervousness, anxiety, stress, depression    All other systems were reviewed and are negative, except as noted.    VITALS/PHYSICAL EXAM  --------------------------------------------------------------------------------  T(C): 36.9 (10-24-20 @ 08:13), Max: 37.2 (10-23-20 @ 16:07)  HR: 80 (10-24-20 @ 08:13) (69 - 87)  BP: 151/65 (10-24-20 @ 08:13) (130/86 - 190/68)  RR: 18 (10-24-20 @ 08:13) (18 - 20)  SpO2: 92% (10-24-20 @ 08:13) (91% - 95%)  Wt(kg): --        10-23-20 @ 07:01  -  10-24-20 @ 07:00  --------------------------------------------------------  IN: 1000 mL / OUT: 100 mL / NET: 900 mL      Physical Exam:  	Gen: NAD  	HEENT: PERRL, supple neck,  	Pulm: CTA B/L  	CV: RRR, S1S2; no rub  	Abd: +BS, soft, nontender  	UE: Warm  	LE: Warm, + edema  	Neuro: No focal deficits  	Psych: Normal affect and mood  	Skin: Warm, pale  	Vascular access: BARBRA AVF    LABS/STUDIES  --------------------------------------------------------------------------------              9.7    7.00  >-----------<  165      [10-24-20 @ 06:05]              33.1     139  |  98  |  50.0  ----------------------------<  105      [10-23-20 @ 06:26]  4.0   |  22.0  |  5.45        Ca     9.0     [10-23-20 @ 06:26]      Mg     2.1     [10-23-20 @ 06:26]      Phos  5.0     [10-23-20 @ 06:26]        PTT: 36.2       [10-23-20 @ 15:48]      Iron 53, TIBC 266, %sat 20      [08-19-20 @ 06:32]  Ferritin 184      [08-19-20 @ 06:32]  PTH -- (Ca 9.6)      [08-19-20 @ 16:08]   91  Vitamin D (25OH) 26.4      [08-19-20 @ 16:08]  HbA1c 4.9      [08-09-18 @ 05:54]  TSH 2.16      [09-15-20 @ 02:01]

## 2020-10-24 NOTE — PROGRESS NOTE ADULT - ASSESSMENT
85 yo M w/ PAD s/p RLE angiogram with angioplasty in june 2020 presents w/ persistent RLE pain. describes pain as shooting pain in his right foot. no alleviating factors. exacerbated with prolonged standing or walking. Patient had angiogram with CO2 in june which did not significantly helping. denies other associate symptoms including f/c/sob/n/v/ha/chest pain.  (21 Oct 2020 17:57)  S/P RLE angio/SFA angioplasty/peroneal angioplasty and pop stents x 3                                   Loss of signals overnight; improved with AC, now with dopplerable AT, PT at RLE    Plan:  Will DC heparin gtt and start Eliquis and cont Plavix  Cont Statin  OOB/Ambulate/PT  DM management  Pain management   Epo as per renal with HD  Possible dc home in am

## 2020-10-24 NOTE — CHART NOTE - NSCHARTNOTEFT_GEN_A_CORE
Notified by RN that patient fell; examined patient at bedside. On arrival, history provided by both RN and patient. Patient was sitting on side of bed, attempting to stand, when he slipped and fell, landing on his behind. He denies hitting his head or LOC. Patient was found laying comfortably in bed, and denied any pain. Full trauma physical exam performed, with the following findings:    PHYSICAL EXAM:  General:A&Ox3, resting comfortably  HEENT: Normocephalic, atraumatic, EOMI, PEERLA; pupils 3mm bilaterally. CN II-XII intact, nontender thorughout cranium  Neck: Soft, midline trachea, nontender to palpation  throughout spine. ROM intact  Pulm: CTAB  Chest: No chest wall tenderness.   CV: RRR, w/ 2/6 holosystolic murmur appreciated  Abdomen: Soft, NTND  Pelvis: Stable, nontender  Ext: Palpable radial & DP pulses bilaterally, nontender to palpation throughout all joints, strength 5/5 bilaterally in the upper/lower extremities.  Back: No TTP; no palpable runoff/stepoff/deformity    Based on exam, patient has no identifiable injuries from fall. No current need for imaging. Provided reassurance to patient and encouraged use of call bell as needed for ambulation. Will continue to monitor.

## 2020-10-24 NOTE — PROGRESS NOTE ADULT - SUBJECTIVE AND OBJECTIVE BOX
INTERVAL HPI/OVERNIGHT EVENTS:    SUBJECTIVE: Patient evaluated at bedside, found resting comfortably in bed, nad. No acute complaints or concerns. Patient states he continues to feel some "burning sensation" at R foot but pain well controlled. Remains on heparin drip with plans to transition to Eliquis today. Stable vascular exam. Denies sob, chest pain, n/v, f/c.       MEDICATIONS  (STANDING):  apixaban 2.5 milliGRAM(s) Oral two times a day  ceFAZolin   IVPB 2000 milliGRAM(s) IV Intermittent once  cloNIDine 0.1 milliGRAM(s) Oral at bedtime  clopidogrel Tablet 75 milliGRAM(s) Oral daily  dextrose 5%. 1000 milliLiter(s) (50 mL/Hr) IV Continuous <Continuous>  dextrose 50% Injectable 12.5 Gram(s) IV Push once  dextrose 50% Injectable 25 Gram(s) IV Push once  dextrose 50% Injectable 25 Gram(s) IV Push once  doxazosin 1 milliGRAM(s) Oral at bedtime  DULoxetine 60 milliGRAM(s) Oral daily  hydrALAZINE 50 milliGRAM(s) Oral two times a day  insulin lispro (ADMELOG) corrective regimen sliding scale   SubCutaneous three times a day before meals  isosorbide   mononitrate ER Tablet (IMDUR) 30 milliGRAM(s) Oral daily  NIFEdipine XL 60 milliGRAM(s) Oral at bedtime  NIFEdipine XL 90 milliGRAM(s) Oral daily  senna 2 Tablet(s) Oral at bedtime    MEDICATIONS  (PRN):  acetaminophen   Tablet .. 650 milliGRAM(s) Oral every 6 hours PRN Mild Pain (1 - 3)  dextrose 40% Gel 15 Gram(s) Oral once PRN Blood Glucose LESS THAN 70 milliGRAM(s)/deciliter  glucagon  Injectable 1 milliGRAM(s) IntraMuscular once PRN Glucose LESS THAN 70 milligrams/deciliter  ondansetron Injectable 4 milliGRAM(s) IV Push every 6 hours PRN Nausea  traMADol 25 milliGRAM(s) Oral every 6 hours PRN Moderate Pain (4 - 6)      Vital Signs Last 24 Hrs  T(C): 37.1 (23 Oct 2020 22:40), Max: 37.7 (23 Oct 2020 07:26)  T(F): 98.7 (23 Oct 2020 22:40), Max: 99.8 (23 Oct 2020 07:26)  HR: 76 (24 Oct 2020 00:12) (69 - 97)  BP: 158/72 (24 Oct 2020 00:12) (152/64 - 190/68)  BP(mean): --  RR: 18 (23 Oct 2020 22:40) (18 - 20)  SpO2: 94% (23 Oct 2020 22:40) (90% - 96%)    PE  Gen: resting comfortably in bed, nad  Pulm: no increased wob, no conversational dyspnea  Abd: non-distended, soft, non-tender  Ext: distal extremities warm and well-perfused, no peripheral edema, dopplerable R AT and PT, mild redness to R toes but remain motor and sensory intact. R toes cool compared to L. R foot warm to touch.         I&O's Detail    22 Oct 2020 07:01  -  23 Oct 2020 07:00  --------------------------------------------------------  IN:  Total IN: 0 mL    OUT:    Voided (mL): 75 mL  Total OUT: 75 mL    Total NET: -75 mL      23 Oct 2020 07:01  -  24 Oct 2020 04:44  --------------------------------------------------------  IN:    Other (mL): 1000 mL  Total IN: 1000 mL    OUT:  Total OUT: 0 mL    Total NET: 1000 mL          LABS:                        9.7    6.27  )-----------( 175      ( 23 Oct 2020 06:26 )             32.3     10-23    139  |  98  |  50.0<H>  ----------------------------<  105<H>  4.0   |  22.0  |  5.45<H>    Ca    9.0      23 Oct 2020 06:26  Phos  5.0     10-23  Mg     2.1     10-23    TPro  6.0<L>  /  Alb  3.5  /  TBili  0.5  /  DBili  x   /  AST  22  /  ALT  17  /  AlkPhos  123<H>  10-22    PTT - ( 23 Oct 2020 15:48 )  PTT:36.2 sec      RADIOLOGY & ADDITIONAL STUDIES:

## 2020-10-24 NOTE — PROGRESS NOTE ADULT - ASSESSMENT
1) ESRD on HD  2)  PAD s/p RLE angiogram with stent placement via right ankle and left groin approach  3) Anemia of CKD  4) HTN- Bp stable    Resume HD on Monday  Hb stable- continue AMY with HD  Monitor H/H.   1) ESRD on HD  2)  PAD s/p RLE angiogram with stent placement via right ankle and left groin approach  3) Anemia of CKD  4) HTN    Resume HD on Monday  Hb stable- continue AMY with HD  UF as tolerated  Monitor H/H.

## 2020-10-25 LAB
HCT VFR BLD CALC: 30.5 % — LOW (ref 39–50)
HGB BLD-MCNC: 9.2 G/DL — LOW (ref 13–17)
MCHC RBC-ENTMCNC: 28.4 PG — SIGNIFICANT CHANGE UP (ref 27–34)
MCHC RBC-ENTMCNC: 30.2 GM/DL — LOW (ref 32–36)
MCV RBC AUTO: 94.1 FL — SIGNIFICANT CHANGE UP (ref 80–100)
PLATELET # BLD AUTO: 135 K/UL — LOW (ref 150–400)
RBC # BLD: 3.24 M/UL — LOW (ref 4.2–5.8)
RBC # FLD: 16 % — HIGH (ref 10.3–14.5)
WBC # BLD: 5.89 K/UL — SIGNIFICANT CHANGE UP (ref 3.8–10.5)
WBC # FLD AUTO: 5.89 K/UL — SIGNIFICANT CHANGE UP (ref 3.8–10.5)

## 2020-10-25 PROCEDURE — 72170 X-RAY EXAM OF PELVIS: CPT | Mod: 26

## 2020-10-25 PROCEDURE — 99233 SBSQ HOSP IP/OBS HIGH 50: CPT

## 2020-10-25 RX ORDER — ACETAMINOPHEN 500 MG
1000 TABLET ORAL ONCE
Refills: 0 | Status: COMPLETED | OUTPATIENT
Start: 2020-10-25 | End: 2020-10-25

## 2020-10-25 RX ADMIN — TRAMADOL HYDROCHLORIDE 25 MILLIGRAM(S): 50 TABLET ORAL at 04:07

## 2020-10-25 RX ADMIN — Medication 60 MILLIGRAM(S): at 21:23

## 2020-10-25 RX ADMIN — ISOSORBIDE MONONITRATE 30 MILLIGRAM(S): 60 TABLET, EXTENDED RELEASE ORAL at 12:58

## 2020-10-25 RX ADMIN — Medication 50 MILLIGRAM(S): at 17:17

## 2020-10-25 RX ADMIN — APIXABAN 2.5 MILLIGRAM(S): 2.5 TABLET, FILM COATED ORAL at 17:17

## 2020-10-25 RX ADMIN — SENNA PLUS 2 TABLET(S): 8.6 TABLET ORAL at 21:21

## 2020-10-25 RX ADMIN — Medication 50 MILLIGRAM(S): at 05:14

## 2020-10-25 RX ADMIN — TRAMADOL HYDROCHLORIDE 25 MILLIGRAM(S): 50 TABLET ORAL at 21:20

## 2020-10-25 RX ADMIN — Medication 1: at 17:17

## 2020-10-25 RX ADMIN — TRAMADOL HYDROCHLORIDE 25 MILLIGRAM(S): 50 TABLET ORAL at 22:20

## 2020-10-25 RX ADMIN — Medication 90 MILLIGRAM(S): at 05:14

## 2020-10-25 RX ADMIN — CLOPIDOGREL BISULFATE 75 MILLIGRAM(S): 75 TABLET, FILM COATED ORAL at 12:58

## 2020-10-25 RX ADMIN — Medication 1 MILLIGRAM(S): at 21:22

## 2020-10-25 RX ADMIN — APIXABAN 2.5 MILLIGRAM(S): 2.5 TABLET, FILM COATED ORAL at 05:14

## 2020-10-25 RX ADMIN — DULOXETINE HYDROCHLORIDE 60 MILLIGRAM(S): 30 CAPSULE, DELAYED RELEASE ORAL at 12:58

## 2020-10-25 RX ADMIN — Medication 400 MILLIGRAM(S): at 13:23

## 2020-10-25 RX ADMIN — TRAMADOL HYDROCHLORIDE 25 MILLIGRAM(S): 50 TABLET ORAL at 05:07

## 2020-10-25 RX ADMIN — Medication 0.1 MILLIGRAM(S): at 21:23

## 2020-10-25 RX ADMIN — Medication 1000 MILLIGRAM(S): at 13:38

## 2020-10-25 NOTE — PROGRESS NOTE ADULT - ASSESSMENT
1) ESRD on HD  2)  PAD s/p RLE angiogram with stent placement via right ankle and left groin approach  3) Anemia of CKD  4) HTN    Resume HD on Monday  Hb stable- continue AMY with HD  UF as tolerated  Monitor H/H.

## 2020-10-25 NOTE — PROGRESS NOTE ADULT - ASSESSMENT
A/P: 87 yo M w/ PAD s/p RLE angiogram with angioplasty June 2020 initially presenting w/persistent RLE pain, now s/p RLE angio/SFA angioplasty/peroneal angioplasty and pop stents x 3 on 10/22. Dopplerable AT, PT at RLE, doing well. Cleared by PT for d/c to home with assistance.                          Plan:  -Eliquis and Plavix  -OOB, Ambulate as tolerated and with assistance  -Pain control  -D/C to home pending ability to safely ambulate

## 2020-10-25 NOTE — PROGRESS NOTE ADULT - SUBJECTIVE AND OBJECTIVE BOX
HPI/OVERNIGHT EVENTS: Patient had mechanical fall from side of bed overnight (Refer to chart note on 10/24 for details). Otherwise no acute events. Originally planned for d/c on 10/24, but patient's daughter expressed concerns regarding safety of discharge. After discussion involving patient, daughter, and Dr. Sorenson, agree upon that patient would stay one more night. Patient feels well overall. Notes improving pain in his right foot. Expresses interest in returning home.    MEDICATIONS  (STANDING):  apixaban 2.5 milliGRAM(s) Oral two times a day  ceFAZolin   IVPB 2000 milliGRAM(s) IV Intermittent once  cloNIDine 0.1 milliGRAM(s) Oral at bedtime  clopidogrel Tablet 75 milliGRAM(s) Oral daily  dextrose 5%. 1000 milliLiter(s) (50 mL/Hr) IV Continuous <Continuous>  dextrose 50% Injectable 12.5 Gram(s) IV Push once  dextrose 50% Injectable 25 Gram(s) IV Push once  dextrose 50% Injectable 25 Gram(s) IV Push once  doxazosin 1 milliGRAM(s) Oral at bedtime  DULoxetine 60 milliGRAM(s) Oral daily  hydrALAZINE 50 milliGRAM(s) Oral two times a day  insulin lispro (ADMELOG) corrective regimen sliding scale   SubCutaneous three times a day before meals  isosorbide   mononitrate ER Tablet (IMDUR) 30 milliGRAM(s) Oral daily  NIFEdipine XL 60 milliGRAM(s) Oral at bedtime  NIFEdipine XL 90 milliGRAM(s) Oral daily  senna 2 Tablet(s) Oral at bedtime    MEDICATIONS  (PRN):  acetaminophen   Tablet .. 650 milliGRAM(s) Oral every 6 hours PRN Mild Pain (1 - 3)  dextrose 40% Gel 15 Gram(s) Oral once PRN Blood Glucose LESS THAN 70 milliGRAM(s)/deciliter  glucagon  Injectable 1 milliGRAM(s) IntraMuscular once PRN Glucose LESS THAN 70 milligrams/deciliter  ondansetron Injectable 4 milliGRAM(s) IV Push every 6 hours PRN Nausea  traMADol 25 milliGRAM(s) Oral every 8 hours PRN Moderate Pain (4 - 6)      Vital Signs Last 24 Hrs  T(C): 36.8 (25 Oct 2020 04:30), Max: 36.9 (24 Oct 2020 08:13)  T(F): 98.3 (25 Oct 2020 04:30), Max: 98.5 (24 Oct 2020 08:13)  HR: 75 (25 Oct 2020 04:30) (67 - 87)  BP: 136/57 (25 Oct 2020 04:30) (116/48 - 155/63)  RR: 16 (25 Oct 2020 04:30) (16 - 18)  SpO2: 95% (25 Oct 2020 04:30) (92% - 95%)    General: A&Ox3, NAD. Resting comfortably  HEENT: NCAT, EOMI, PERRLA  Pulm: CTAB  CV: RRR, w/ 2/6 holosystolic murmur appreciated  Abdomen: Soft, NTND  Ext: Palpable radial & DP pulses bilaterally. Dopplerable DP/PT on R foot. Noted erythema in R foot, with mild TTP      I&O's Detail    23 Oct 2020 07:01  -  24 Oct 2020 07:00  --------------------------------------------------------  IN:    Other (mL): 1000 mL  Total IN: 1000 mL    OUT:    Voided (mL): 100 mL  Total OUT: 100 mL    Total NET: 900 mL      24 Oct 2020 07:01  -  25 Oct 2020 05:07  --------------------------------------------------------  IN:    Oral Fluid: 240 mL  Total IN: 240 mL    OUT:    Voided (mL): 50 mL  Total OUT: 50 mL    Total NET: 190 mL          LABS:                        9.7    7.00  )-----------( 165      ( 24 Oct 2020 06:05 )             33.1     10-23    139  |  98  |  50.0<H>  ----------------------------<  105<H>  4.0   |  22.0  |  5.45<H>    Ca    9.0      23 Oct 2020 06:26  Phos  5.0     10-23  Mg     2.1     10-23      PTT - ( 23 Oct 2020 15:48 )  PTT:36.2 sec

## 2020-10-25 NOTE — PROGRESS NOTE ADULT - SUBJECTIVE AND OBJECTIVE BOX
[facility] DIVISION OF KIDNEY DISEASES AND HYPERTENSION -- FOLLOW UP NOTE  --------------------------------------------------------------------------------  Chief Complaint: ESRD on HD    24 hour events/subjective:  No acute event  Pt states he feels well          PAST HISTORY  --------------------------------------------------------------------------------  No significant changes to PMH, PSH, FHx, SHx, unless otherwise noted    ALLERGIES & MEDICATIONS  --------------------------------------------------------------------------------  Allergies    Plavix (Hives)  Toprol-XL (Rash)    Intolerances      Standing Inpatient Medications  apixaban 2.5 milliGRAM(s) Oral two times a day  ceFAZolin   IVPB 2000 milliGRAM(s) IV Intermittent once  cloNIDine 0.1 milliGRAM(s) Oral at bedtime  clopidogrel Tablet 75 milliGRAM(s) Oral daily  dextrose 5%. 1000 milliLiter(s) IV Continuous <Continuous>  dextrose 50% Injectable 12.5 Gram(s) IV Push once  dextrose 50% Injectable 25 Gram(s) IV Push once  dextrose 50% Injectable 25 Gram(s) IV Push once  doxazosin 1 milliGRAM(s) Oral at bedtime  DULoxetine 60 milliGRAM(s) Oral daily  hydrALAZINE 50 milliGRAM(s) Oral two times a day  insulin lispro (ADMELOG) corrective regimen sliding scale   SubCutaneous three times a day before meals  isosorbide   mononitrate ER Tablet (IMDUR) 30 milliGRAM(s) Oral daily  NIFEdipine XL 60 milliGRAM(s) Oral at bedtime  NIFEdipine XL 90 milliGRAM(s) Oral daily  senna 2 Tablet(s) Oral at bedtime    PRN Inpatient Medications  acetaminophen   Tablet .. 650 milliGRAM(s) Oral every 6 hours PRN  dextrose 40% Gel 15 Gram(s) Oral once PRN  glucagon  Injectable 1 milliGRAM(s) IntraMuscular once PRN  ondansetron Injectable 4 milliGRAM(s) IV Push every 6 hours PRN  traMADol 25 milliGRAM(s) Oral every 8 hours PRN      REVIEW OF SYSTEMS  --------------------------------------------------------------------------------  Gen: No weight changes, fatigue, fevers/chills, weakness  Skin: No rashes  Head/Eyes/Ears/Mouth: No headache; Normal hearing; Normal vision w/o blurriness; No sinus pain/discomfort, sore throat  Respiratory: No dyspnea, cough, wheezing, hemoptysis  CV: No chest pain, PND, orthopnea  GI: No abdominal pain, diarrhea, constipation, nausea, vomiting, melena, hematochezia  : No increased frequency, dysuria, hematuria, nocturia  MSK: No joint pain/swelling; no back pain; no edema  Neuro: No dizziness/lightheadedness, weakness, seizures, numbness, tingling  Heme: No easy bruising or bleeding  Endo: No heat/cold intolerance  Psych: No significant nervousness, anxiety, stress, depression    All other systems were reviewed and are negative, except as noted.    VITALS/PHYSICAL EXAM  --------------------------------------------------------------------------------  T(C): 37.1 (10-25-20 @ 08:11), Max: 37.1 (10-25-20 @ 08:11)  HR: 85 (10-25-20 @ 08:11) (67 - 85)  BP: 121/61 (10-25-20 @ 08:11) (116/48 - 155/63)  RR: 18 (10-25-20 @ 08:11) (16 - 18)  SpO2: 96% (10-25-20 @ 08:11) (92% - 96%)  Wt(kg): --        10-24-20 @ 07:01  -  10-25-20 @ 07:00  --------------------------------------------------------  IN: 360 mL / OUT: 150 mL / NET: 210 mL    10-25-20 @ 07:01  -  10-25-20 @ 10:37  --------------------------------------------------------  IN: 0 mL / OUT: 150 mL / NET: -150 mL      Physical Exam:  	Gen: NAD  	HEENT: supple neck  	Pulm: CTA B/L  	CV: RRR, S1S2; no rub  	Back: No spinal or CVA tenderness; no sacral edema  	Abd: +BS, soft, nontender/nondistended  	: No suprapubic tenderness  	UE: Warm, no edema; no asterixis  	LE: Warm, no edema  	Neuro: No focal deficits  	Psych: Normal affect and mood  	Skin: Warm, without rashes  	Vascular access: BARBRA TONEY    LABS/STUDIES  --------------------------------------------------------------------------------              9.2    5.89  >-----------<  135      [10-25-20 @ 06:07]              30.5       PTT: 36.2       [10-23-20 @ 15:48]      Creatinine Trend:  SCr 5.45 [10-23 @ 06:26]  SCr 5.59 [10-22 @ 06:04]  SCr 4.76 [10-21 @ 17:33]        Iron 53, TIBC 266, %sat 20      [08-19-20 @ 06:32]  Ferritin 184      [08-19-20 @ 06:32]  PTH -- (Ca 9.6)      [08-19-20 @ 16:08]   91  Vitamin D (25OH) 26.4      [08-19-20 @ 16:08]  HbA1c 4.9      [08-09-18 @ 05:54]  TSH 2.16      [09-15-20 @ 02:01]

## 2020-10-26 ENCOUNTER — OUTPATIENT (OUTPATIENT)
Dept: OUTPATIENT SERVICES | Facility: HOSPITAL | Age: 85
LOS: 1 days | Discharge: HOME | End: 2020-10-26

## 2020-10-26 ENCOUNTER — APPOINTMENT (OUTPATIENT)
Dept: ELECTROPHYSIOLOGY | Facility: CLINIC | Age: 85
End: 2020-10-26
Payer: MEDICARE

## 2020-10-26 DIAGNOSIS — Z98.62 PERIPHERAL VASCULAR ANGIOPLASTY STATUS: Chronic | ICD-10-CM

## 2020-10-26 DIAGNOSIS — I77.0 ARTERIOVENOUS FISTULA, ACQUIRED: Chronic | ICD-10-CM

## 2020-10-26 DIAGNOSIS — Z98.890 OTHER SPECIFIED POSTPROCEDURAL STATES: Chronic | ICD-10-CM

## 2020-10-26 DIAGNOSIS — D63.1 ANEMIA IN CHRONIC KIDNEY DISEASE: ICD-10-CM

## 2020-10-26 DIAGNOSIS — Z95.2 PRESENCE OF PROSTHETIC HEART VALVE: Chronic | ICD-10-CM

## 2020-10-26 DIAGNOSIS — N18.4 CHRONIC KIDNEY DISEASE, STAGE 4 (SEVERE): ICD-10-CM

## 2020-10-26 PROBLEM — I35.0 NONRHEUMATIC AORTIC (VALVE) STENOSIS: Chronic | Status: ACTIVE | Noted: 2020-08-15

## 2020-10-26 PROBLEM — E11.9 TYPE 2 DIABETES MELLITUS WITHOUT COMPLICATIONS: Chronic | Status: ACTIVE | Noted: 2017-01-31

## 2020-10-26 LAB
ANION GAP SERPL CALC-SCNC: 17 MMOL/L — SIGNIFICANT CHANGE UP (ref 5–17)
BASOPHILS # BLD AUTO: 0.05 K/UL — SIGNIFICANT CHANGE UP (ref 0–0.2)
BASOPHILS NFR BLD AUTO: 1 % — SIGNIFICANT CHANGE UP (ref 0–2)
BUN SERPL-MCNC: 52 MG/DL — HIGH (ref 8–20)
CALCIUM SERPL-MCNC: 9 MG/DL — SIGNIFICANT CHANGE UP (ref 8.6–10.2)
CHLORIDE SERPL-SCNC: 95 MMOL/L — LOW (ref 98–107)
CO2 SERPL-SCNC: 23 MMOL/L — SIGNIFICANT CHANGE UP (ref 22–29)
CREAT SERPL-MCNC: 5.46 MG/DL — HIGH (ref 0.5–1.3)
EOSINOPHIL # BLD AUTO: 0.19 K/UL — SIGNIFICANT CHANGE UP (ref 0–0.5)
EOSINOPHIL NFR BLD AUTO: 3.6 % — SIGNIFICANT CHANGE UP (ref 0–6)
GLUCOSE SERPL-MCNC: 108 MG/DL — HIGH (ref 70–99)
HCT VFR BLD CALC: 30.5 % — LOW (ref 39–50)
HGB BLD-MCNC: 9 G/DL — LOW (ref 13–17)
IMM GRANULOCYTES NFR BLD AUTO: 0.4 % — SIGNIFICANT CHANGE UP (ref 0–1.5)
LYMPHOCYTES # BLD AUTO: 0.45 K/UL — LOW (ref 1–3.3)
LYMPHOCYTES # BLD AUTO: 8.6 % — LOW (ref 13–44)
MAGNESIUM SERPL-MCNC: 2.2 MG/DL — SIGNIFICANT CHANGE UP (ref 1.8–2.6)
MCHC RBC-ENTMCNC: 27.9 PG — SIGNIFICANT CHANGE UP (ref 27–34)
MCHC RBC-ENTMCNC: 29.5 GM/DL — LOW (ref 32–36)
MCV RBC AUTO: 94.4 FL — SIGNIFICANT CHANGE UP (ref 80–100)
MONOCYTES # BLD AUTO: 0.62 K/UL — SIGNIFICANT CHANGE UP (ref 0–0.9)
MONOCYTES NFR BLD AUTO: 11.8 % — SIGNIFICANT CHANGE UP (ref 2–14)
NEUTROPHILS # BLD AUTO: 3.92 K/UL — SIGNIFICANT CHANGE UP (ref 1.8–7.4)
NEUTROPHILS NFR BLD AUTO: 74.6 % — SIGNIFICANT CHANGE UP (ref 43–77)
PHOSPHATE SERPL-MCNC: 5.5 MG/DL — HIGH (ref 2.4–4.7)
PLATELET # BLD AUTO: 159 K/UL — SIGNIFICANT CHANGE UP (ref 150–400)
POTASSIUM SERPL-MCNC: 4.5 MMOL/L — SIGNIFICANT CHANGE UP (ref 3.5–5.3)
POTASSIUM SERPL-SCNC: 4.5 MMOL/L — SIGNIFICANT CHANGE UP (ref 3.5–5.3)
RBC # BLD: 3.23 M/UL — LOW (ref 4.2–5.8)
RBC # FLD: 15.9 % — HIGH (ref 10.3–14.5)
SODIUM SERPL-SCNC: 135 MMOL/L — SIGNIFICANT CHANGE UP (ref 135–145)
WBC # BLD: 5.25 K/UL — SIGNIFICANT CHANGE UP (ref 3.8–10.5)
WBC # FLD AUTO: 5.25 K/UL — SIGNIFICANT CHANGE UP (ref 3.8–10.5)

## 2020-10-26 PROCEDURE — 90937 HEMODIALYSIS REPEATED EVAL: CPT

## 2020-10-26 PROCEDURE — G2066: CPT

## 2020-10-26 PROCEDURE — 93298 REM INTERROG DEV EVAL SCRMS: CPT

## 2020-10-26 RX ORDER — GABAPENTIN 400 MG/1
100 CAPSULE ORAL THREE TIMES A DAY
Refills: 0 | Status: DISCONTINUED | OUTPATIENT
Start: 2020-10-26 | End: 2020-10-26

## 2020-10-26 RX ORDER — SEVELAMER CARBONATE 2400 MG/1
800 POWDER, FOR SUSPENSION ORAL
Refills: 0 | Status: DISCONTINUED | OUTPATIENT
Start: 2020-10-26 | End: 2020-10-31

## 2020-10-26 RX ORDER — TRAMADOL HYDROCHLORIDE 50 MG/1
50 TABLET ORAL EVERY 6 HOURS
Refills: 0 | Status: DISCONTINUED | OUTPATIENT
Start: 2020-10-26 | End: 2020-10-27

## 2020-10-26 RX ORDER — IBUPROFEN 200 MG
400 TABLET ORAL EVERY 8 HOURS
Refills: 0 | Status: DISCONTINUED | OUTPATIENT
Start: 2020-10-26 | End: 2020-10-27

## 2020-10-26 RX ORDER — OXYCODONE HYDROCHLORIDE 5 MG/1
10 TABLET ORAL
Refills: 0 | Status: DISCONTINUED | OUTPATIENT
Start: 2020-10-26 | End: 2020-10-26

## 2020-10-26 RX ORDER — ERYTHROPOIETIN 10000 [IU]/ML
12000 INJECTION, SOLUTION INTRAVENOUS; SUBCUTANEOUS
Refills: 0 | Status: DISCONTINUED | OUTPATIENT
Start: 2020-10-26 | End: 2020-10-28

## 2020-10-26 RX ADMIN — SENNA PLUS 2 TABLET(S): 8.6 TABLET ORAL at 22:39

## 2020-10-26 RX ADMIN — TRAMADOL HYDROCHLORIDE 50 MILLIGRAM(S): 50 TABLET ORAL at 07:00

## 2020-10-26 RX ADMIN — APIXABAN 2.5 MILLIGRAM(S): 2.5 TABLET, FILM COATED ORAL at 06:03

## 2020-10-26 RX ADMIN — ISOSORBIDE MONONITRATE 30 MILLIGRAM(S): 60 TABLET, EXTENDED RELEASE ORAL at 11:24

## 2020-10-26 RX ADMIN — Medication 400 MILLIGRAM(S): at 18:45

## 2020-10-26 RX ADMIN — Medication 650 MILLIGRAM(S): at 04:40

## 2020-10-26 RX ADMIN — DULOXETINE HYDROCHLORIDE 60 MILLIGRAM(S): 30 CAPSULE, DELAYED RELEASE ORAL at 11:24

## 2020-10-26 RX ADMIN — Medication 0.1 MILLIGRAM(S): at 22:39

## 2020-10-26 RX ADMIN — Medication 400 MILLIGRAM(S): at 17:50

## 2020-10-26 RX ADMIN — CLOPIDOGREL BISULFATE 75 MILLIGRAM(S): 75 TABLET, FILM COATED ORAL at 11:24

## 2020-10-26 RX ADMIN — SEVELAMER CARBONATE 800 MILLIGRAM(S): 2400 POWDER, FOR SUSPENSION ORAL at 11:24

## 2020-10-26 RX ADMIN — Medication 1 MILLIGRAM(S): at 22:39

## 2020-10-26 RX ADMIN — APIXABAN 2.5 MILLIGRAM(S): 2.5 TABLET, FILM COATED ORAL at 17:50

## 2020-10-26 RX ADMIN — SEVELAMER CARBONATE 800 MILLIGRAM(S): 2400 POWDER, FOR SUSPENSION ORAL at 17:50

## 2020-10-26 RX ADMIN — OXYCODONE HYDROCHLORIDE 10 MILLIGRAM(S): 5 TABLET ORAL at 09:46

## 2020-10-26 RX ADMIN — TRAMADOL HYDROCHLORIDE 50 MILLIGRAM(S): 50 TABLET ORAL at 06:02

## 2020-10-26 RX ADMIN — Medication 650 MILLIGRAM(S): at 03:52

## 2020-10-26 RX ADMIN — OXYCODONE HYDROCHLORIDE 10 MILLIGRAM(S): 5 TABLET ORAL at 10:46

## 2020-10-26 RX ADMIN — ERYTHROPOIETIN 12000 UNIT(S): 10000 INJECTION, SOLUTION INTRAVENOUS; SUBCUTANEOUS at 09:51

## 2020-10-26 RX ADMIN — Medication 1: at 11:24

## 2020-10-26 RX ADMIN — Medication 50 MILLIGRAM(S): at 17:50

## 2020-10-26 RX ADMIN — Medication 50 MILLIGRAM(S): at 06:02

## 2020-10-26 RX ADMIN — Medication 60 MILLIGRAM(S): at 22:39

## 2020-10-26 RX ADMIN — Medication 1: at 17:50

## 2020-10-26 RX ADMIN — Medication 90 MILLIGRAM(S): at 06:02

## 2020-10-26 NOTE — PROVIDER CONTACT NOTE (OTHER) - ASSESSMENT
change of shift pt had edema to left area of AV fistula, Pt stated he has no pains
A&Ox4. Complaining of 10/10 pain. Grimacing & yelling. Pulses heard on doppler to R leg. Pt able to move RLE.
Pt. A&Ox4. Doppler used for pulses. DP pulse +2 to left foot with doppler. DP pulse to R foot unable to be found. PT +2 to R foot with doppler. R foot toes noted to be red and cool to touch. Pt. has all sensation and is able to move R foot.

## 2020-10-26 NOTE — PROGRESS NOTE ADULT - SUBJECTIVE AND OBJECTIVE BOX
Patient seen and examined, On HD,     I&O's Summary    25 Oct 2020 07:01  -  26 Oct 2020 07:00  --------------------------------------------------------  IN: 0 mL / OUT: 975 mL / NET: -975 mL    26 Oct 2020 07:01  -  26 Oct 2020 14:45  --------------------------------------------------------  IN: 0 mL / OUT: 500 mL / NET: -500 mL    REVIEW OF SYSTEMS:    CONSTITUTIONAL: No F/C  RESPIRATORY: No cough or SOB  CARDIOVASCULAR: No CP/palpitations,    GASTROINTESTINAL: No abdominal pain , NVD   GENITOURINARY: No UTI sx  NEUROLOGICAL: No headaches/wk/numbness  MUSCULOSKELETAL:  No joint pain/swelling; No LBP  EXTREMITIES : no swelling, + Ischemic pain,     Vital Signs Last 24 Hrs  T(C): 37.2 (26 Oct 2020 11:12), Max: 37.2 (26 Oct 2020 11:12)  T(F): 99 (26 Oct 2020 11:12), Max: 99 (26 Oct 2020 11:12)  HR: 89 (26 Oct 2020 11:12) (77 - 116)  BP: 149/63 (26 Oct 2020 11:12) (109/48 - 156/72)  RR: 18 (26 Oct 2020 11:12) (18 - 19)  SpO2: 92% (26 Oct 2020 11:12) (91% - 98%)    PHYSICAL EXAM:    GENERAL: NAD, Pale,   EYES:  conjunctiva and sclera clear  NECK: Supple, No JVD/Bruit  NERVOUS SYSTEM:  A/O x3,   CHEST:  CTA ,No rales or rhonchi  HEART:  RRR, No murmurs  ABDOMEN: Soft, NT/ ND, BS+  EXTREMITIES:  No Edema;  SKIN: No rashes  AVF +,     LABS:                        9.0    5.25  )-----------( 159      ( 26 Oct 2020 05:40 )             30.5     10-26    135  |  95<L>  |  52.0<H>  ----------------------------<  108<H>  4.5   |  23.0  |  5.46<H>    Ca    9.0      26 Oct 2020 05:40  Phos  5.5     10-26  Mg     2.2     10-26    MEDICATIONS  (STANDING):  acetaminophen   Tablet .. PRN  apixaban  ceFAZolin   IVPB  cloNIDine  clopidogrel Tablet  dextrose 40% Gel PRN  dextrose 5%.  dextrose 50% Injectable  dextrose 50% Injectable  dextrose 50% Injectable  doxazosin  DULoxetine  epoetin juan-epbx (RETACRIT) Injectable  glucagon  Injectable PRN  hydrALAZINE  ibuprofen  Tablet. PRN  insulin lispro (ADMELOG) corrective regimen sliding scale  isosorbide   mononitrate ER Tablet (IMDUR)  NIFEdipine XL  NIFEdipine XL  ondansetron Injectable PRN  senna  sevelamer carbonate  traMADol PRN  traMADol PRN

## 2020-10-26 NOTE — PROGRESS NOTE ADULT - ASSESSMENT
Patient lost 0.5kg and had 2 hours of dialysis.     Patient had infiltration  at the lt arm .     Had agitation from pain and received pain medication.     No c/o pain now .

## 2020-10-26 NOTE — PROVIDER CONTACT NOTE (OTHER) - SITUATION
Pt. complaining of pain 10/10 to right leg. Pain is sharp, on the R posterior thigh. Tylenol given earlier, no with increase in pain.

## 2020-10-26 NOTE — PROGRESS NOTE ADULT - SUBJECTIVE AND OBJECTIVE BOX
Patient was seen and evaluated on dialysis.   No c/o CP SOB NV  no F/C  no swelling    T(C): 36.8 (10-26-20 @ 07:51), Max: 36.9 (10-26-20 @ 05:01)  HR: 80 (10-26-20 @ 07:51) (77 - 83)  BP: 140/70 (10-26-20 @ 07:51) (109/48 - 156/72)  Wt(kg): --  PE ;  In Pain, Restless,   lungs - CTA  CV gr 1 murmur,  No gallop or rub  Abd : soft, NT BS +, No masses  Ext- No edema  Neuro : Grossly intact, moving extremities , Ischemia - RLE.                        9.0    5.25  )-----------( 159      ( 26 Oct 2020 05:40 )             30.5      10-26    135  |  95<L>  |  52.0<H>  ----------------------------<  108<H>  4.5   |  23.0  |  5.46<H>    Ca    9.0      26 Oct 2020 05:40  Phos  5.5     10-26  Mg     2.2     10-26    MEDICATIONS  (STANDING):  acetaminophen   Tablet .. PRN  apixaban  ceFAZolin   IVPB  cloNIDine  clopidogrel Tablet  dextrose 40% Gel PRN  dextrose 5%.  dextrose 50% Injectable  dextrose 50% Injectable  dextrose 50% Injectable  doxazosin  DULoxetine  epoetin juan-epbx (RETACRIT) Injectable  gabapentin  glucagon  Injectable PRN  hydrALAZINE  insulin lispro (ADMELOG) corrective regimen sliding scale  isosorbide   mononitrate ER Tablet (IMDUR)  NIFEdipine XL  NIFEdipine XL  ondansetron Injectable PRN  oxyCODONE    IR PRN  senna  sevelamer carbonate  traMADol PRN  traMADol PRN      Patient stable  Josh HD easily  Continue Pain Management,

## 2020-10-26 NOTE — PROGRESS NOTE ADULT - SUBJECTIVE AND OBJECTIVE BOX
CHRISTIANO ELIZABETH    [id number]    History:  The patient is status post Right Lower ext endovascular revascularization, now POD 4. Patient is doing well. The patient's pain is controlled using the prescribed pain medications. The patient is participating in physical therapy. Denies nausea, vomiting, chest pain, shortness of breath, abdominal pain or fever. No new complaints. No new acute motor or sensory changes are reported.    Vital Signs Last 24 Hrs  T(C): 36.8 (26 Oct 2020 07:51), Max: 37.1 (25 Oct 2020 08:11)  T(F): 98.2 (26 Oct 2020 07:51), Max: 98.8 (25 Oct 2020 08:11)  HR: 80 (26 Oct 2020 07:51) (77 - 85)  BP: 140/70 (26 Oct 2020 07:51) (121/61 - 156/72)  BP(mean): --  RR: 19 (26 Oct 2020 07:51) (18 - 19)  SpO2: 97% (26 Oct 2020 07:51) (91% - 97%)  I&O's Summary    25 Oct 2020 07:01  -  26 Oct 2020 07:00  --------------------------------------------------------  IN: 0 mL / OUT: 975 mL / NET: -975 mL                              9.0    5.25  )-----------( 159      ( 26 Oct 2020 05:40 )             30.5     10-26    135  |  95<L>  |  52.0<H>  ----------------------------<  108<H>  4.5   |  23.0  |  5.46<H>    Ca    9.0      26 Oct 2020 05:40  Phos  5.5     10-26  Mg     2.2     10-26        MEDICATIONS  (STANDING):  apixaban 2.5 milliGRAM(s) Oral two times a day  ceFAZolin   IVPB 2000 milliGRAM(s) IV Intermittent once  cloNIDine 0.1 milliGRAM(s) Oral at bedtime  clopidogrel Tablet 75 milliGRAM(s) Oral daily  dextrose 5%. 1000 milliLiter(s) (50 mL/Hr) IV Continuous <Continuous>  dextrose 50% Injectable 12.5 Gram(s) IV Push once  dextrose 50% Injectable 25 Gram(s) IV Push once  dextrose 50% Injectable 25 Gram(s) IV Push once  doxazosin 1 milliGRAM(s) Oral at bedtime  DULoxetine 60 milliGRAM(s) Oral daily  hydrALAZINE 50 milliGRAM(s) Oral two times a day  insulin lispro (ADMELOG) corrective regimen sliding scale   SubCutaneous three times a day before meals  isosorbide   mononitrate ER Tablet (IMDUR) 30 milliGRAM(s) Oral daily  NIFEdipine XL 60 milliGRAM(s) Oral at bedtime  NIFEdipine XL 90 milliGRAM(s) Oral daily  senna 2 Tablet(s) Oral at bedtime    MEDICATIONS  (PRN):  acetaminophen   Tablet .. 650 milliGRAM(s) Oral every 6 hours PRN Mild Pain (1 - 3)  dextrose 40% Gel 15 Gram(s) Oral once PRN Blood Glucose LESS THAN 70 milliGRAM(s)/deciliter  glucagon  Injectable 1 milliGRAM(s) IntraMuscular once PRN Glucose LESS THAN 70 milligrams/deciliter  ondansetron Injectable 4 milliGRAM(s) IV Push every 6 hours PRN Nausea  traMADol 25 milliGRAM(s) Oral every 8 hours PRN Moderate Pain (4 - 6)  traMADol 50 milliGRAM(s) Oral every 6 hours PRN Severe Pain (7 - 10)      Physical exam:     General: A&Ox3, NAD. Resting comfortably  HEENT: NCAT, EOMI, PERRLA  Pulm: Non labored   CV: s1,s2   Abdomen: Soft, NTND, no palpable pulsatile mass   Ext: Palpable radial & DP pulses bilaterally. Dopplerable DP/PT on R foot. Noted erythema in R foot, with mild TTP      Primary Orthopedic Assessment:  • s/p Right Lower extremity endovascular revascularization   - patent improved distal perfusion appreciated     Plan:   • plan for discharge home today pending family's approval

## 2020-10-27 LAB
HCT VFR BLD CALC: 33.4 % — LOW (ref 39–50)
HGB BLD-MCNC: 10 G/DL — LOW (ref 13–17)
MCHC RBC-ENTMCNC: 28.2 PG — SIGNIFICANT CHANGE UP (ref 27–34)
MCHC RBC-ENTMCNC: 29.9 GM/DL — LOW (ref 32–36)
MCV RBC AUTO: 94.1 FL — SIGNIFICANT CHANGE UP (ref 80–100)
PLATELET # BLD AUTO: 179 K/UL — SIGNIFICANT CHANGE UP (ref 150–400)
RBC # BLD: 3.55 M/UL — LOW (ref 4.2–5.8)
RBC # FLD: 15.9 % — HIGH (ref 10.3–14.5)
WBC # BLD: 4.79 K/UL — SIGNIFICANT CHANGE UP (ref 3.8–10.5)
WBC # FLD AUTO: 4.79 K/UL — SIGNIFICANT CHANGE UP (ref 3.8–10.5)

## 2020-10-27 PROCEDURE — 99233 SBSQ HOSP IP/OBS HIGH 50: CPT

## 2020-10-27 PROCEDURE — 99223 1ST HOSP IP/OBS HIGH 75: CPT

## 2020-10-27 RX ORDER — SEVELAMER CARBONATE 2400 MG/1
1 POWDER, FOR SUSPENSION ORAL
Qty: 90 | Refills: 0
Start: 2020-10-27 | End: 2020-11-25

## 2020-10-27 RX ORDER — IBUPROFEN 200 MG
600 TABLET ORAL EVERY 8 HOURS
Refills: 0 | Status: DISCONTINUED | OUTPATIENT
Start: 2020-10-27 | End: 2020-10-28

## 2020-10-27 RX ORDER — IBUPROFEN 200 MG
1 TABLET ORAL
Qty: 0 | Refills: 0 | DISCHARGE
Start: 2020-10-27

## 2020-10-27 RX ADMIN — Medication 600 MILLIGRAM(S): at 16:02

## 2020-10-27 RX ADMIN — Medication 650 MILLIGRAM(S): at 12:07

## 2020-10-27 RX ADMIN — SEVELAMER CARBONATE 800 MILLIGRAM(S): 2400 POWDER, FOR SUSPENSION ORAL at 17:27

## 2020-10-27 RX ADMIN — CLOPIDOGREL BISULFATE 75 MILLIGRAM(S): 75 TABLET, FILM COATED ORAL at 12:08

## 2020-10-27 RX ADMIN — APIXABAN 2.5 MILLIGRAM(S): 2.5 TABLET, FILM COATED ORAL at 05:43

## 2020-10-27 RX ADMIN — Medication 400 MILLIGRAM(S): at 08:39

## 2020-10-27 RX ADMIN — Medication 600 MILLIGRAM(S): at 21:50

## 2020-10-27 RX ADMIN — Medication 650 MILLIGRAM(S): at 13:05

## 2020-10-27 RX ADMIN — Medication 650 MILLIGRAM(S): at 05:43

## 2020-10-27 RX ADMIN — Medication 100 MILLIGRAM(S): at 21:50

## 2020-10-27 RX ADMIN — Medication 650 MILLIGRAM(S): at 19:08

## 2020-10-27 RX ADMIN — Medication 650 MILLIGRAM(S): at 06:30

## 2020-10-27 RX ADMIN — APIXABAN 2.5 MILLIGRAM(S): 2.5 TABLET, FILM COATED ORAL at 17:28

## 2020-10-27 RX ADMIN — Medication 600 MILLIGRAM(S): at 22:20

## 2020-10-27 RX ADMIN — Medication 650 MILLIGRAM(S): at 18:38

## 2020-10-27 RX ADMIN — ISOSORBIDE MONONITRATE 30 MILLIGRAM(S): 60 TABLET, EXTENDED RELEASE ORAL at 12:09

## 2020-10-27 RX ADMIN — Medication 1: at 12:07

## 2020-10-27 RX ADMIN — Medication 90 MILLIGRAM(S): at 05:43

## 2020-10-27 RX ADMIN — Medication 400 MILLIGRAM(S): at 09:30

## 2020-10-27 RX ADMIN — SEVELAMER CARBONATE 800 MILLIGRAM(S): 2400 POWDER, FOR SUSPENSION ORAL at 08:39

## 2020-10-27 RX ADMIN — Medication 50 MILLIGRAM(S): at 05:46

## 2020-10-27 RX ADMIN — Medication 50 MILLIGRAM(S): at 17:28

## 2020-10-27 RX ADMIN — Medication 600 MILLIGRAM(S): at 17:00

## 2020-10-27 RX ADMIN — Medication 1 MILLIGRAM(S): at 21:50

## 2020-10-27 RX ADMIN — Medication 0.1 MILLIGRAM(S): at 21:50

## 2020-10-27 RX ADMIN — Medication 60 MILLIGRAM(S): at 21:50

## 2020-10-27 RX ADMIN — SEVELAMER CARBONATE 800 MILLIGRAM(S): 2400 POWDER, FOR SUSPENSION ORAL at 12:08

## 2020-10-27 RX ADMIN — SENNA PLUS 2 TABLET(S): 8.6 TABLET ORAL at 21:50

## 2020-10-27 RX ADMIN — DULOXETINE HYDROCHLORIDE 60 MILLIGRAM(S): 30 CAPSULE, DELAYED RELEASE ORAL at 12:09

## 2020-10-27 NOTE — PROGRESS NOTE ADULT - SUBJECTIVE AND OBJECTIVE BOX
had LUE infiltration of AVF however swelling this morning improved  LUE AVF w/ pulsatile thrill   pt more comfortable this morning w/ motrin relieving symptoms of RLE rest pain  otherwise appears well   plan for dc today to home      Physical exam:     General: A&Ox3, NAD. Resting comfortably    Abdomen: Soft, NTND  Ext: Palpable radial & DP pulses bilaterally.   LUE w pulsatile thrill of AVF w ecchymosis and improved edema  mono/bi signal of RLE AT, dependent rubor of RLE w no tissue loss

## 2020-10-27 NOTE — CONSULT NOTE ADULT - ATTENDING COMMENTS
pt with episodes of recurrent transient AV block with prolonged period of asystole resulting in syncope. Due to this, he is at risk of recurrent AV block and will benefit from pacemaker implant to prevent future morbidity. Given his dialysis and vascular disease, a leadless pacemaker would be preferable to avoid risks associated with limited vascular access and increased infectious risks. We discussed this in detail with the patient and his daughter, as well as primary team. The risks and benefits of ppm implant, including procedure related risks such as bleeding, vascular injury, cardiac perforation and stroke were reviewed. He expressed understnanding and does want to proceed this admission.  hold eliquis.   plan for leadless ppm implant.

## 2020-10-27 NOTE — CONSULT NOTE ADULT - SUBJECTIVE AND OBJECTIVE BOX
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, s/p ILR implant 6/10/20 for longitudinal rhythm monitoring, PAD s/p RLE angioplasty June 2020 and       PAST MEDICAL & SURGICAL HISTORY:  GI bleeding  due to ulcerated polyps and colonic AVMs    Aortic stenosis  - s/p valve replacement    ESRD on dialysis  HD on Mondays and Fridays    Risk factors for obstructive sleep apnea    Anemia    RICHARDS (dyspnea on exertion)    VT (ventricular tachycardia)    HTN (hypertension)    CAD (coronary artery disease)    Arrhythmia    AV block, 1st degree    DM (diabetes mellitus)  - resolved    S/P TAVR (transcatheter aortic valve replacement)    H/O carotid endarterectomy  Right    A-V fistula  left arm 5/2017    H/O angioplasty  2013,  no  intervention    H/O left knee surgery    H/O circumcision  at  age  65        REVIEW OF SYSTEMS:    CONSTITUTIONAL: No fever, weight loss, or fatigue  EYES: No visual disturbances  NECK: No pain or stiffness  RESPIRATORY: No cough, wheezing, chills or hemoptysis; No shortness of breath  CARDIOVASCULAR: see HPI  GASTROINTESTINAL: No abdominal or epigastric pain. No nausea, vomiting, or hematemesis; No diarrhea or constipation. No melena or hematochezia.  NEUROLOGICAL: No headaches, memory loss, loss of strength, numbness, or tremors  SKIN: No itching, burning, rashes, or lesions   LYMPH NODES: No enlarged glands  ENDOCRINE: No heat or cold intolerance; No hair loss  PSYCHIATRIC: No depression, anxiety, mood swings, or difficulty sleeping  HEME/LYMPH: No easy bruising, or bleeding gums      MEDICATIONS  (STANDING):  apixaban 2.5 milliGRAM(s) Oral two times a day  ceFAZolin   IVPB 2000 milliGRAM(s) IV Intermittent once  cloNIDine 0.1 milliGRAM(s) Oral at bedtime  clopidogrel Tablet 75 milliGRAM(s) Oral daily  dextrose 5%. 1000 milliLiter(s) (50 mL/Hr) IV Continuous <Continuous>  dextrose 50% Injectable 12.5 Gram(s) IV Push once  dextrose 50% Injectable 25 Gram(s) IV Push once  dextrose 50% Injectable 25 Gram(s) IV Push once  doxazosin 1 milliGRAM(s) Oral at bedtime  DULoxetine 60 milliGRAM(s) Oral daily  epoetin juan-epbx (RETACRIT) Injectable 16747 Unit(s) IV Push <User Schedule>  hydrALAZINE 50 milliGRAM(s) Oral two times a day  ibuprofen  Tablet. 600 milliGRAM(s) Oral every 8 hours  insulin lispro (ADMELOG) corrective regimen sliding scale   SubCutaneous three times a day before meals  isosorbide   mononitrate ER Tablet (IMDUR) 30 milliGRAM(s) Oral daily  NIFEdipine XL 60 milliGRAM(s) Oral at bedtime  NIFEdipine XL 90 milliGRAM(s) Oral daily  senna 2 Tablet(s) Oral at bedtime  sevelamer carbonate 800 milliGRAM(s) Oral three times a day with meals    MEDICATIONS  (PRN):  acetaminophen   Tablet .. 650 milliGRAM(s) Oral every 6 hours PRN Mild Pain (1 - 3)  dextrose 40% Gel 15 Gram(s) Oral once PRN Blood Glucose LESS THAN 70 milliGRAM(s)/deciliter  glucagon  Injectable 1 milliGRAM(s) IntraMuscular once PRN Glucose LESS THAN 70 milligrams/deciliter  ondansetron Injectable 4 milliGRAM(s) IV Push every 6 hours PRN Nausea      Allergies    Plavix (Hives)  Toprol-XL (Rash)    Intolerances        SOCIAL HISTORY:    FAMILY HISTORY:  FH: type 2 diabetes    Family history of premature CAD    Family history of lung cancer (Grandparent)    Family history of cancer (Grandparent)        Vital Signs Last 24 Hrs  T(C): 36.8 (27 Oct 2020 07:20), Max: 37 (26 Oct 2020 22:47)  T(F): 98.2 (27 Oct 2020 07:20), Max: 98.6 (26 Oct 2020 22:47)  HR: 82 (27 Oct 2020 07:20) (71 - 100)  BP: 167/57 (27 Oct 2020 07:20) (142/65 - 167/57)  BP(mean): --  RR: 18 (27 Oct 2020 07:20) (18 - 18)  SpO2: 90% (27 Oct 2020 07:20) (90% - 96%)    Physical Exam:  Constitutional: AAOx3, NAD  Neck: supple, No JVD  Cardiovascular: +S1S2 RRR, no murmurs, rubs, gallops   Pulmonary: CTA b/l, unlabored, no wheezes, rales. rhonci  Abdomen: +BS, soft NTND  Extremities: no edema b/l, +distal pulses b/l  Neuro: non focal, speech clear, MARQUEZ x 4    LABS:                        10.0   4.79  )-----------( 179      ( 27 Oct 2020 06:13 )             33.4   10-26    135  |  95<L>  |  52.0<H>  ----------------------------<  108<H>  4.5   |  23.0  |  5.46<H>    Ca    9.0      26 Oct 2020 05:40  Phos  5.5     10-26  Mg     2.2     10-26              RADIOLOGY & ADDITIONAL STUDIES:       [place] [facility]  [facility]   Office: [address] [facility], [place] [address]  Telephone: [phone] Fax:[phone]      85 yo M h/o anamolous right coronary artery, CAD, hypertension, hyperlipidemia, HFpEF, moderate AS, carotid disease s/p remote CEA, stage 5 CKD with LUE fistula, 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 Review of recent ILR transmission revealed episode of CHB > 4 seconds with a single, wide complex escape beat on 10/15/20 around 4pm. Patient reports an episode of near syncope correlating with ILR event - states his legs "suddenly gave out". His grandson was behind him and reports pt suddenly fell backward and "almost passed out", but he was able to catch him and prevent trauma. The patient recovered spontaneously. EP is consulted for consideration of pacemaker implant. Patient reports ongoing right heal/foot pain w/ ambulation; currently denies chest pain, shortness of breath at rest , recurrent near/true syncope, fevers/chills, N/V/D, or other cardiac or constitutional symptoms.     Notably, the only other recent event on the patient's ILR is an episode of nonsustained wide complex tachycardia lasting, which occurred during TAVR implant. No other events noted.     PAST MEDICAL & SURGICAL HISTORY:  GI bleeding due to ulcerated polyps and colonic AVMs  Aortic stenosis- s/p valve replacement  ESRD on dialysis HD on Mondays and Fridays  Risk factors for obstructive sleep apnea  Anemia  RICHARDS (dyspnea on exertion)  VT (ventricular tachycardia)  HTN (hypertension)  CAD (coronary artery disease)  Arrhythmia  AV block, 1st degree  DM (diabetes mellitus)- resolved  S/P TAVR (transcatheter aortic valve replacement)  H/O carotid endarterectomy Right  A-V fistula left arm 5/2017  H/O left knee surgery  H/O circumcision at  age  65        REVIEW OF SYSTEMS:  CONSTITUTIONAL: No fever, weight loss, or fatigue  EYES: No visual disturbances  NECK: No pain or stiffness  RESPIRATORY: No cough, wheezing, chills or hemoptysis; No shortness of breath  CARDIOVASCULAR: see HPI  GASTROINTESTINAL: No abdominal or epigastric pain. No nausea, vomiting, or hematemesis; No diarrhea or constipation. No melena or hematochezia.  NEUROLOGICAL: No headaches, memory loss, loss of strength, numbness, or tremors  SKIN: No itching, burning, rashes, or lesions   LYMPH NODES: No enlarged glands  ENDOCRINE: No heat or cold intolerance; No hair loss  PSYCHIATRIC: No depression, anxiety, mood swings, or difficulty sleeping  HEME/LYMPH: No easy bruising, or bleeding gums      MEDICATIONS  (STANDING):  apixaban 2.5 milliGRAM(s) Oral two times a day  ceFAZolin   IVPB 2000 milliGRAM(s) IV Intermittent once  cloNIDine 0.1 milliGRAM(s) Oral at bedtime  clopidogrel Tablet 75 milliGRAM(s) Oral daily  dextrose 5%. 1000 milliLiter(s) (50 mL/Hr) IV Continuous <Continuous>  dextrose 50% Injectable 12.5 Gram(s) IV Push once  dextrose 50% Injectable 25 Gram(s) IV Push once  dextrose 50% Injectable 25 Gram(s) IV Push once  doxazosin 1 milliGRAM(s) Oral at bedtime  DULoxetine 60 milliGRAM(s) Oral daily  epoetin juan-epbx (RETACRIT) Injectable 96633 Unit(s) IV Push <User Schedule>  hydrALAZINE 50 milliGRAM(s) Oral two times a day  ibuprofen  Tablet. 600 milliGRAM(s) Oral every 8 hours  insulin lispro (ADMELOG) corrective regimen sliding scale   SubCutaneous three times a day before meals  isosorbide   mononitrate ER Tablet (IMDUR) 30 milliGRAM(s) Oral daily  NIFEdipine XL 60 milliGRAM(s) Oral at bedtime  NIFEdipine XL 90 milliGRAM(s) Oral daily  senna 2 Tablet(s) Oral at bedtime  sevelamer carbonate 800 milliGRAM(s) Oral three times a day with meals    MEDICATIONS  (PRN):  acetaminophen   Tablet .. 650 milliGRAM(s) Oral every 6 hours PRN Mild Pain (1 - 3)  dextrose 40% Gel 15 Gram(s) Oral once PRN Blood Glucose LESS THAN 70 milliGRAM(s)/deciliter  glucagon  Injectable 1 milliGRAM(s) IntraMuscular once PRN Glucose LESS THAN 70 milligrams/deciliter  ondansetron Injectable 4 milliGRAM(s) IV Push every 6 hours PRN Nausea      Allergies  Plavix (Hives)  Toprol-XL (Rash)    SOCIAL HISTORY: lives at home with wife; denies ETOH/tobacco/drugs    FAMILY HISTORY:  FH: type 2 diabetes  Family history of premature CAD  Family history of lung cancer (Grandparent)  Family history of cancer (Grandparent)        Vital Signs Last 24 Hrs  T(C): 36.8 (27 Oct 2020 07:20), Max: 37 (26 Oct 2020 22:47)  T(F): 98.2 (27 Oct 2020 07:20), Max: 98.6 (26 Oct 2020 22:47)  HR: 82 (27 Oct 2020 07:20) (71 - 100)  BP: 167/57 (27 Oct 2020 07:20) (142/65 - 167/57)  BP(mean): --  RR: 18 (27 Oct 2020 07:20) (18 - 18)  SpO2: 90% (27 Oct 2020 07:20) (90% - 96%)    Physical Exam:  Constitutional: AAOx3, NAD  Neck: supple, No JVD  Cardiovascular: +S1S2 RRR, no murmurs, rubs, gallops   Pulmonary: CTA b/l, unlabored, no wheezes, rales. rhonci  Abdomen: +BS, soft NTND  Extremities: RLE w/ dependent rubor, moderate edema to ankle; LLE without edema  Neuro: non focal, speech clear, MARQUEZ x 4    LABS:                        10.0   4.79  )-----------( 179      ( 27 Oct 2020 06:13 )             33.4   10-26    135  |  95<L>  |  52.0<H>  ----------------------------<  108<H>  4.5   |  23.0  |  5.46<H>    Ca    9.0      26 Oct 2020 05:40  Phos  5.5     10-26  Mg     2.2     10-26      RADIOLOGY & ADDITIONAL STUDIES:  < from: TTE Echo Limited or F/U (09.17.20 @ 21:31) >  PHYSICIAN INTERPRETATION:  Left Ventricle: The left ventricular internal cavity size is normal.  Global LV systolic function was normal.Left ventricular ejection fraction, by visual estimation, is 60 to 65%.  Right Ventricle: Normal right ventricular size and function.  Left Atrium: Mildly enlarged left atrium.  Right Atrium: The right atrium is normal in size.  Pericardium: There isno evidence of pericardial effusion.  Tricuspid Valve: No tricuspid regurgitation is visualized.  Aortic Valve: No evidence of aortic valve regurgitation is seen. Bioprosthetic aortic valve visualized. Likely Padilla JENN. well seated valve. Acceptable gradients. No regurgitation.  Venous: The inferior vena cava was dilated, with respiratory size variation less than 50%.    Summary:   1. Patient tachycardic during the entire study.   2. Mildly enlarged left atrium.   3. There is mild concentricleft ventricular hypertrophy.   4. Hyperdyanmic wall motion. Left ventricular ejection fraction, by visual estimation, is 60 to 65%.   5. The right atrium is normal in size.   6. Normal right ventricular size and function.   7. Bioprosthetic aortic valve visualized. Likely Padilla JENN. well seated valve. Acceptable gradients. No regurgitation.   8. There is no evidence of pericardial effusion.    MD Rohith, RPVI Electronically signed on 9/18/2020 at 10:11:03 AM  < end of copied text >    < from: Cardiac Cath Lab - Adult (08.17.20 @ 12:22) >  CORONARY VESSELS: The coronary circulation is co-dominant.  LM:   --  Ostial LM: There was a 30 % stenosis.  LAD:   --  Mid LAD: There was a 50 % stenosis.  --  Distal LAD: There was a 60 % stenosis.  CX:   --  Proximal circumflex: There was a 20 % stenosis.  RCA:   --  Mid RCA: There was a 70 % stenosis. Superior takeoff.  COMPLICATIONS: No complications occurred during the cath lab visit.  DIAGNOSTIC IMPRESSIONS: Severe RCA disease but co-dominant vessel, does not  supply large territory.  Moderate LAD disease- similar to angiogram from 2017.  Peak to peak gardient of 55-60 mmHg- consistent with severe AS.  LVEDP 14 mmHg.  DIAGNOSTIC RECOMMENDATIONS: Medical management of CAD.  TAVR evaluation. (Plavix allergy- may have to consider using effient as  DAPT post TAVR)  Prepared and signed by  Raymond Mariscal MD  Signed 08/17/2020 19:11:56    < end of copied text >

## 2020-10-27 NOTE — PROGRESS NOTE ADULT - SUBJECTIVE AND OBJECTIVE BOX
The normalized protein catabolic rate (nPCR) is a formula commonly used to assess dietary protein intake in dialysis patients, as a means towards determining nutritional adequacy, a major problem in many ESRD patients. For example, say you have a patient on dialysis who has a pre-dialysis BUN of 18 mg/dL–reasonably low, right?. This could mean that the patient is a well-nourished individual who is adequately dialyzed. But it could also mean that the patient is malnourished, which is often linked to poor appetite that is linked to inadequate dialysis. The nPCR helps distinguish between these possibilities.  The nPCR is reported in grams of urea nitrogen per kilogram per day, and can be calculated using several methods. One of them is shown here:  nPCR = 0.22 + (.036 * intradialytic rise in BUN * 24)/(intradialytic interval).  For instance, if the pre-dialysis BUN is 70 and the post-dialysis BUN is 18, and the intradialytic interval is 44 hours (e.g., there is an interval of 44 hours from the end of one dialysis until the beginning of the next), then the nPCR is calculated to be 1.24 g/kg/day. As you can see, a large intradialytic rise in BUN is generally indicative of adequate nutrition. Most guidelines specify maintaining the protein intake above 1.0 – 1.2 g/kg/day in dialysis patients, with values less than 0.8 g/kg/day being equated with malnutrition.  If the patient has significant residual renal function, then one must add a term that takes into account endogenous urea clearance. Importantly, these equations are only valid for individuals in steady state, and are not as helpful in the setting of acute illness. Separate equations exist for peritoneal dialysis, since the BUN levels are relatively constant and therefore pre- and post- levels cannot be used to estimate nPCR; rather urea levels in serum and peritoneal filtrate can be compared. Nutritional status can be especially important to assess in patients on peritoneal dialysis, since these patients are at high risk of losing albumin in the peritoneal filtrate. There is a nice discussion of this topic in Up-To-Date (“Protein catabolic rate in maintenance dialysis“), which includes alternative formulas for nPCR that incorporate Kt/V.    135    |  95<L>  |  52.0<H>  ----------------------------<  108<H>  Ca:9.0   (26 Oct 2020 05:40)  4.5     |  23.0   |  5.46<H>                        10.0<L>  4.79  )-----------( 179      ( 27 Oct 2020 06:13 )             33.4<L>    Phos:5.5 mg/dL<H> M.2 mg/dL (10-26 @ 05:40)    AN INTEGRATIVE APPROACH FOR PREVENTION AND TREATMENT OF PROTEIN ENERGY WASTING (PEW) IN CHRONIC KIDNEY DISEASE (CKD) PATIENTS: SUMMARY AND RECOMMENDATIONS:    Because of its metabolic and functional importance in whole-body homeostasis, preservation of muscle mass is the ultimate goal in the management of PEW in CKD patients. In normal conditions, apart from genetic determinants, protein anabolism is determined by nutrient availability, especially amino acids, and a greater ratio of anabolic to the catabolic hormones, that is, insulin, androgens, growth factors, and catecholamines. In CKD and ESRD patients, where a number of catabolic signals dominate, it is critical to maintain a dietary protein and energy intake relative to needs. Preemptive treatment of concurrent conditions that contribute to catabolism, such as metabolic acidosis, insulin resistance, and systemic inflammation, is of paramount importance for the prevention of development PEW. A holistic approach to dialytic prescription is necessary to avoid the adverse nutritional side effects of uremic toxin retention. Non conventional dialytic strategies may remove the necessity for overrestrictive diets in maintenance dialysis patients leading to improved nutritional status.38 When supplemental nutrition is indicated, it is crucial to take into account all the determinants of body and muscle mass: protein and energy content, exercise, anabolizing hormones, antioxidants and antiinflammatory nutrients or drugs, and other specific nutrients.38 In certain cases, a targeted approach using specific nutrients such as essential amino acids36 or branched-chain amino acid supplements have been shown to improve both nutrient intakes and nutritional status.37 Strategies to improve anabolic signaling pathways such as insulin or growth hormone through pharmacological (that is, recombinant human GH or androgens) and nonpharmacological (that is, exercise or anti-inflammatory nutrients) means are promising interventions to increase muscle mass in maintenance dialysis patients.44., 45., 46. Finally, it is important to assess the impact of nutritional supplements not only in terms of changes in nutritional parameters, but to translate these observations to potential improvements in hospitalization, mortality, and cost effectiveness. The potential complications of nutritional interventions are minimal, if any, especially for the ones targeted for prevention. In addition to the number of deaths and hospitalizations that can be prevented by improvements in nutritional status, the predicted financial gains greatly overcome any cost associated with readily available nutritional interventions for CKD and ESRD patients.:

## 2020-10-27 NOTE — PROGRESS NOTE ADULT - ASSESSMENT
86M w/ CLI s/p RLE angiogram with angioplasty June 2020 initially presenting w/persistent RLE rest pain, now s/p RLE angio/SFA angioplasty/peroneal angioplasty and pop stents x 3 on 10/22    Now w/ continued RLE foot pain likely reperfusion  vs persistent rest pain     -Gentle compression of infiltrated LUE AVF   -On Eliquis and Plavix  -OOB, Ambulate as tolerated and with assistance  -Pain control w/ non narcotics   -D/C to home pending ability to safely ambulate    HD in AM,

## 2020-10-27 NOTE — PROGRESS NOTE ADULT - SUBJECTIVE AND OBJECTIVE BOX
LUE infiltration of AVF however swelling this morning improved  LUE AVF w/ pulsatile thrill   pt more comfortable this morning w/ Motrin relieving symptoms of RLE rest pain  otherwise appears well   plan for dc today to home    Physical exam:     Vital Signs Last 24 Hrs  T(C): 36.8 (27 Oct 2020 07:20), Max: 37 (26 Oct 2020 22:47)  T(F): 98.2 (27 Oct 2020 07:20), Max: 98.6 (26 Oct 2020 22:47)  HR: 82 (27 Oct 2020 07:20) (71 - 100)  BP: 167/57 (27 Oct 2020 07:20) (142/65 - 167/57)  RR: 18 (27 Oct 2020 07:20) (18 - 18)  SpO2: 90% (27 Oct 2020 07:20) (90% - 96%)    MEDICATIONS  (STANDING):    apixaban 2.5 milliGRAM(s) Oral two times a day  ceFAZolin   IVPB 2000 milliGRAM(s) IV Intermittent once  cloNIDine 0.1 milliGRAM(s) Oral at bedtime  clopidogrel Tablet 75 milliGRAM(s) Oral daily  dextrose 5%. 1000 milliLiter(s) (50 mL/Hr) IV Continuous <Continuous>  dextrose 50% Injectable 12.5 Gram(s) IV Push once  dextrose 50% Injectable 25 Gram(s) IV Push once  dextrose 50% Injectable 25 Gram(s) IV Push once  doxazosin 1 milliGRAM(s) Oral at bedtime  DULoxetine 60 milliGRAM(s) Oral daily  epoetin juan-epbx (RETACRIT) Injectable 40685 Unit(s) IV Push <User Schedule>  hydrALAZINE 50 milliGRAM(s) Oral two times a day  insulin lispro (ADMELOG) corrective regimen sliding scale   SubCutaneous three times a day before meals  isosorbide   mononitrate ER Tablet (IMDUR) 30 milliGRAM(s) Oral daily  NIFEdipine XL 60 milliGRAM(s) Oral at bedtime  NIFEdipine XL 90 milliGRAM(s) Oral daily  senna 2 Tablet(s) Oral at bedtime  sevelamer carbonate 800 milliGRAM(s) Oral three times a day with meals    MEDICATIONS  (PRN):  acetaminophen   Tablet .. 650 milliGRAM(s) Oral every 6 hours PRN Mild Pain (1 - 3)  dextrose 40% Gel 15 Gram(s) Oral once PRN Blood Glucose LESS THAN 70 milliGRAM(s)/deciliter  glucagon  Injectable 1 milliGRAM(s) IntraMuscular once PRN Glucose LESS THAN 70 milligrams/deciliter  ibuprofen  Tablet. 400 milliGRAM(s) Oral every 8 hours PRN Mild Pain (1 - 3)  ondansetron Injectable 4 milliGRAM(s) IV Push every 6 hours PRN Nausea    General:     A&Ox3, NAD. Resting comfortably  Abdomen: Soft, NT ND  Ext: Palpable radial & DP pulses bilaterally.   LUE w pulsatile thrill of AVF w ecchymosis and improved edema  mono/bi signal of RLE AT, dependent rubor of RLE w no tissue loss    135    |  95<L>  |  52.0<H>  ----------------------------<  108<H>  Ca:9.0   (26 Oct 2020 05:40)  4.5     |  23.0   |  5.46<H                          10.0<L>  4.79  )-----------( 179      ( 27 Oct 2020 06:13 )             33.4<L>    Phos:5.5 mg/dL<H> M.2 mg/dL  (10-26 @ 05:40)

## 2020-10-27 NOTE — CONSULT NOTE ADULT - ASSESSMENT
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 Noted on ILR transmission to have episode of CHB > 4 seconds with a single, wide complex escape beat, which correlated with an episode of near syncope. EP is consulted for pacemaker implant.     Recommendations:   Given nature of CHB and significant associated symptoms, pt qualifies for pacemaker implant.   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 Noted on ILR transmission to have episode of CHB > 4 seconds with a single, wide complex escape beat, which correlated with an episode of near syncope. EP is consulted for pacemaker implant.     Recommendations:   Given the isolated presentation CHB, he will likely have a limited pacing requirements. However, the associated near syncope was significant and could have resulted in significant trauma if the patient was unaccompanied. Accordingly, permanent pacemaker is indicated.   In addition, the patient has several risk factors for device system infection, particularly ongoing hemodialysis.   [employer]onic Micra Leadless pacemaker implant would be most appropriate.   Will implant discuss timing with Dr. Palma & vascular team.   Further recs to follow.

## 2020-10-27 NOTE — PROGRESS NOTE ADULT - ASSESSMENT
86M w/ CLI s/p RLE angiogram with angioplasty June 2020 initially presenting w/persistent RLE rest pain, now s/p RLE angio/SFA angioplasty/peroneal angioplasty and pop stents x 3 on 10/22  Now w/ continued RLE foot pain likely repurfusion vs persistent rest pain     -continue gently compression of infiltrated LUE AVF   -Eliquis and Plavix  -OOB, Ambulate as tolerated and with assistance  -Pain control w/ non narcotics   -D/C to home pending ability to safely ambulate    FABIAN Hamilton PGY5

## 2020-10-28 LAB
ALBUMIN SERPL ELPH-MCNC: 3.2 G/DL — LOW (ref 3.3–5.2)
ANION GAP SERPL CALC-SCNC: 17 MMOL/L — SIGNIFICANT CHANGE UP (ref 5–17)
BUN SERPL-MCNC: 54 MG/DL — HIGH (ref 8–20)
BUN SERPL-MCNC: 55 MG/DL — HIGH (ref 8–20)
CALCIUM SERPL-MCNC: 9 MG/DL — SIGNIFICANT CHANGE UP (ref 8.6–10.2)
CHLORIDE SERPL-SCNC: 95 MMOL/L — LOW (ref 98–107)
CO2 SERPL-SCNC: 22 MMOL/L — SIGNIFICANT CHANGE UP (ref 22–29)
COLLECT DURATION TIME UR: 24 HR — SIGNIFICANT CHANGE UP
CREAT SERPL-MCNC: 4.98 MG/DL — HIGH (ref 0.5–1.3)
GLUCOSE SERPL-MCNC: 93 MG/DL — SIGNIFICANT CHANGE UP (ref 70–99)
HCT VFR BLD CALC: 29.6 % — LOW (ref 39–50)
HCT VFR BLD CALC: 32.2 % — LOW (ref 39–50)
HGB BLD-MCNC: 8.9 G/DL — LOW (ref 13–17)
HGB BLD-MCNC: 9.5 G/DL — LOW (ref 13–17)
MCHC RBC-ENTMCNC: 27.7 PG — SIGNIFICANT CHANGE UP (ref 27–34)
MCHC RBC-ENTMCNC: 28 PG — SIGNIFICANT CHANGE UP (ref 27–34)
MCHC RBC-ENTMCNC: 29.5 GM/DL — LOW (ref 32–36)
MCHC RBC-ENTMCNC: 30.1 GM/DL — LOW (ref 32–36)
MCV RBC AUTO: 92.2 FL — SIGNIFICANT CHANGE UP (ref 80–100)
MCV RBC AUTO: 95 FL — SIGNIFICANT CHANGE UP (ref 80–100)
PHOSPHATE SERPL-MCNC: 4.2 MG/DL — SIGNIFICANT CHANGE UP (ref 2.4–4.7)
PLATELET # BLD AUTO: 162 K/UL — SIGNIFICANT CHANGE UP (ref 150–400)
PLATELET # BLD AUTO: 165 K/UL — SIGNIFICANT CHANGE UP (ref 150–400)
POTASSIUM SERPL-MCNC: 4.8 MMOL/L — SIGNIFICANT CHANGE UP (ref 3.5–5.3)
POTASSIUM SERPL-SCNC: 4.8 MMOL/L — SIGNIFICANT CHANGE UP (ref 3.5–5.3)
RBC # BLD: 3.21 M/UL — LOW (ref 4.2–5.8)
RBC # BLD: 3.39 M/UL — LOW (ref 4.2–5.8)
RBC # FLD: 15.5 % — HIGH (ref 10.3–14.5)
RBC # FLD: 15.6 % — HIGH (ref 10.3–14.5)
SODIUM SERPL-SCNC: 134 MMOL/L — LOW (ref 135–145)
TOTAL VOLUME - 24 HOUR: 900 ML — SIGNIFICANT CHANGE UP
URINE CREATININE CALCULATION: 0.6 G/24 HR — LOW (ref 1–2)
URINE CREATININE CALCULATION: 0.6 G/24 HR — LOW (ref 1–2)
WBC # BLD: 4.38 K/UL — SIGNIFICANT CHANGE UP (ref 3.8–10.5)
WBC # BLD: 4.59 K/UL — SIGNIFICANT CHANGE UP (ref 3.8–10.5)
WBC # FLD AUTO: 4.38 K/UL — SIGNIFICANT CHANGE UP (ref 3.8–10.5)
WBC # FLD AUTO: 4.59 K/UL — SIGNIFICANT CHANGE UP (ref 3.8–10.5)

## 2020-10-28 PROCEDURE — 99233 SBSQ HOSP IP/OBS HIGH 50: CPT

## 2020-10-28 PROCEDURE — 73610 X-RAY EXAM OF ANKLE: CPT | Mod: 26,RT

## 2020-10-28 PROCEDURE — 99232 SBSQ HOSP IP/OBS MODERATE 35: CPT

## 2020-10-28 RX ORDER — ASPIRIN/CALCIUM CARB/MAGNESIUM 324 MG
81 TABLET ORAL DAILY
Refills: 0 | Status: DISCONTINUED | OUTPATIENT
Start: 2020-10-28 | End: 2020-10-30

## 2020-10-28 RX ORDER — IBUPROFEN 200 MG
600 TABLET ORAL EVERY 8 HOURS
Refills: 0 | Status: DISCONTINUED | OUTPATIENT
Start: 2020-10-28 | End: 2020-10-31

## 2020-10-28 RX ORDER — TRAMADOL HYDROCHLORIDE 50 MG/1
25 TABLET ORAL ONCE
Refills: 0 | Status: DISCONTINUED | OUTPATIENT
Start: 2020-10-28 | End: 2020-10-28

## 2020-10-28 RX ORDER — DARBEPOETIN ALFA IN POLYSORBAT 200MCG/0.4
300 PEN INJECTOR (ML) SUBCUTANEOUS EVERY 2 WEEKS
Refills: 0 | Status: DISCONTINUED | OUTPATIENT
Start: 2020-10-29 | End: 2020-10-29

## 2020-10-28 RX ORDER — ERYTHROPOIETIN 10000 [IU]/ML
12000 INJECTION, SOLUTION INTRAVENOUS; SUBCUTANEOUS ONCE
Refills: 0 | Status: COMPLETED | OUTPATIENT
Start: 2020-10-28 | End: 2020-10-28

## 2020-10-28 RX ORDER — DEXTROSE 10 % IN WATER 10 %
1000 INTRAVENOUS SOLUTION INTRAVENOUS
Refills: 0 | Status: DISCONTINUED | OUTPATIENT
Start: 2020-10-28 | End: 2020-10-29

## 2020-10-28 RX ORDER — ERYTHROPOIETIN 10000 [IU]/ML
12000 INJECTION, SOLUTION INTRAVENOUS; SUBCUTANEOUS
Refills: 0 | Status: DISCONTINUED | OUTPATIENT
Start: 2020-10-28 | End: 2020-10-31

## 2020-10-28 RX ADMIN — ERYTHROPOIETIN 12000 UNIT(S): 10000 INJECTION, SOLUTION INTRAVENOUS; SUBCUTANEOUS at 10:02

## 2020-10-28 RX ADMIN — CLOPIDOGREL BISULFATE 75 MILLIGRAM(S): 75 TABLET, FILM COATED ORAL at 10:00

## 2020-10-28 RX ADMIN — Medication 50 MILLIGRAM(S): at 06:34

## 2020-10-28 RX ADMIN — Medication 650 MILLIGRAM(S): at 17:05

## 2020-10-28 RX ADMIN — Medication 2: at 12:17

## 2020-10-28 RX ADMIN — TRAMADOL HYDROCHLORIDE 25 MILLIGRAM(S): 50 TABLET ORAL at 20:26

## 2020-10-28 RX ADMIN — Medication 600 MILLIGRAM(S): at 07:04

## 2020-10-28 RX ADMIN — SEVELAMER CARBONATE 800 MILLIGRAM(S): 2400 POWDER, FOR SUSPENSION ORAL at 10:00

## 2020-10-28 RX ADMIN — Medication 650 MILLIGRAM(S): at 10:36

## 2020-10-28 RX ADMIN — SEVELAMER CARBONATE 800 MILLIGRAM(S): 2400 POWDER, FOR SUSPENSION ORAL at 17:04

## 2020-10-28 RX ADMIN — Medication 650 MILLIGRAM(S): at 17:35

## 2020-10-28 RX ADMIN — Medication 600 MILLIGRAM(S): at 15:28

## 2020-10-28 RX ADMIN — ISOSORBIDE MONONITRATE 30 MILLIGRAM(S): 60 TABLET, EXTENDED RELEASE ORAL at 11:12

## 2020-10-28 RX ADMIN — Medication 60 MILLIGRAM(S): at 20:28

## 2020-10-28 RX ADMIN — Medication 81 MILLIGRAM(S): at 15:01

## 2020-10-28 RX ADMIN — Medication 1 MILLIGRAM(S): at 20:27

## 2020-10-28 RX ADMIN — TRAMADOL HYDROCHLORIDE 25 MILLIGRAM(S): 50 TABLET ORAL at 21:20

## 2020-10-28 RX ADMIN — Medication 90 MILLIGRAM(S): at 06:34

## 2020-10-28 RX ADMIN — SEVELAMER CARBONATE 800 MILLIGRAM(S): 2400 POWDER, FOR SUSPENSION ORAL at 12:17

## 2020-10-28 RX ADMIN — Medication 0.1 MILLIGRAM(S): at 20:27

## 2020-10-28 RX ADMIN — Medication 650 MILLIGRAM(S): at 10:06

## 2020-10-28 RX ADMIN — SENNA PLUS 2 TABLET(S): 8.6 TABLET ORAL at 20:27

## 2020-10-28 RX ADMIN — Medication 50 MILLIGRAM(S): at 17:04

## 2020-10-28 RX ADMIN — Medication 600 MILLIGRAM(S): at 14:58

## 2020-10-28 RX ADMIN — Medication 600 MILLIGRAM(S): at 23:53

## 2020-10-28 RX ADMIN — DULOXETINE HYDROCHLORIDE 60 MILLIGRAM(S): 30 CAPSULE, DELAYED RELEASE ORAL at 10:01

## 2020-10-28 RX ADMIN — Medication 600 MILLIGRAM(S): at 06:34

## 2020-10-28 NOTE — DIETITIAN INITIAL EVALUATION ADULT. - ORAL INTAKE PTA/DIET HISTORY
Pt reports fair po intake at meals.  Pt states mostly good appetite prior to admission and denies recent wt loss.  Pt states  lbs.  Pt tries to follow low na meal plan at home.  Reinforced meal plan and also discussed importance of consuming adequate protein intake at meals to optimize nutrition status.  Pt receptive and agreeable.

## 2020-10-28 NOTE — PROGRESS NOTE ADULT - ATTENDING COMMENTS
I have personally seen, examined, and participated in the care of this patient. I have reviewed all pertinent clinical information, including history, physical exam, plan, and the PA/NP's note and agree except as noted below.    Plan for leadless pacemaker on Friday morning with Dr. Palma.  Discussed with Vascular and they are OK with holding Apixaban, he will continue on DAPT though  HD tomorrow, keep patient NPO after MN on 10/29    Discussed with daughter, Vanessa.    Chad Johns MD  Clinical Cardiac Electrophysiology

## 2020-10-28 NOTE — DIETITIAN INITIAL EVALUATION ADULT. - PERTINENT LABORATORY DATA
10-28 Na134 mmol/L<L> Glu 93 mg/dL K+ 4.8 mmol/L Cr  4.98 mg/dL<H> BUN 55.0 mg/dL<H> Phos 4.2 mg/dL Alb 3.2 g/dL<L> PAB n/a

## 2020-10-28 NOTE — DIETITIAN INITIAL EVALUATION ADULT. - ETIOLOGY
related to inability to consume increased protein energy intake in setting of ESRD on HD and multiple hospitalizations

## 2020-10-28 NOTE — PROGRESS NOTE ADULT - ASSESSMENT
89 LUE infiltration of AVF however swelling this morning improved  LUE AVF w/ pulsatile thrill   pt more comfortable this morning w/ Motrin relieving symptoms of RLE rest pain  otherwise appears well     Physical exam:     Vital Signs Last 24 Hrs    T(C): 36.8 (27 Oct 2020 07:20), Max: 37 (26 Oct 2020 22:47)  T(F): 98.2 (27 Oct 2020 07:20), Max: 98.6 (26 Oct 2020 22:47)  HR: 82 (27 Oct 2020 07:20) (71 - 100)  BP: 167/57 (27 Oct 2020 07:20) (142/65 - 167/57)  RR: 18 (27 Oct 2020 07:20) (18 - 18)  SpO2: 90% (27 Oct 2020 07:20) (90% - 96%)    MEDICATIONS  (STANDING):    apixaban 2.5 milliGRAM(s) Oral two times a day  ceFAZolin   IVPB 2000 milliGRAM(s) IV Intermittent once  cloNIDine 0.1 milliGRAM(s) Oral at bedtime  clopidogrel Tablet 75 milliGRAM(s) Oral daily  dextrose 5%. 1000 milliLiter(s) (50 mL/Hr) IV Continuous <Continuous>  dextrose 50% Injectable 12.5 Gram(s) IV Push once  dextrose 50% Injectable 25 Gram(s) IV Push once  dextrose 50% Injectable 25 Gram(s) IV Push once  doxazosin 1 milliGRAM(s) Oral at bedtime  DULoxetine 60 milliGRAM(s) Oral daily  epoetin juan-epbx (RETACRIT) Injectable 51300 Unit(s) IV Push <User Schedule>  hydrALAZINE 50 milliGRAM(s) Oral two times a day  insulin lispro (ADMELOG) corrective regimen sliding scale   SubCutaneous three times a day before meals  isosorbide   mononitrate ER Tablet (IMDUR) 30 milliGRAM(s) Oral daily  NIFEdipine XL 60 milliGRAM(s) Oral at bedtime  NIFEdipine XL 90 milliGRAM(s) Oral daily  senna 2 Tablet(s) Oral at bedtime  sevelamer carbonate 800 milliGRAM(s) Oral three times a day with meals    MEDICATIONS  (PRN):  acetaminophen   Tablet .. 650 milliGRAM(s) Oral every 6 hours PRN Mild Pain (1 - 3)  dextrose 40% Gel 15 Gram(s) Oral once PRN Blood Glucose LESS THAN 70 milliGRAM(s)/deciliter  glucagon  Injectable 1 milliGRAM(s) IntraMuscular once PRN Glucose LESS THAN 70 milligrams/deciliter  ibuprofen  Tablet. 400 milliGRAM(s) Oral every 8 hours PRN Mild Pain (1 - 3)  ondansetron Injectable 4 milliGRAM(s) IV Push every 6 hours PRN Nausea    General:     A&Ox3, NAD. Resting comfortably  Abdomen: Soft, NT ND  Ext: Palpable radial & DP pulses bilaterally.   LUE w pulsatile thrill of AVF w ecchymosis and improved edema  mono/bi signal of RLE AT, dependent rubor of RLE w no tissue loss    135    |  95<L>  |  52.0<H>  ----------------------------<  108<H>  Ca:9.0   (26 Oct 2020 05:40)  4.5     |  23.0   |  5.46<H                          10.0<L>  4.79  )-----------( 179      ( 27 Oct 2020 06:13 )             33.4<L>    Phos:5.5 mg/dL<H> M.2 mg/dL  (10-26 @ 05:40)   86M w/ CLI s/p RLE angiogram with angioplasty 2020 initially presenting w/persistent RLE rest pain, now s/p RLE angio/SFA angioplasty/peroneal angioplasty and pop stents x 3 on 10/22    Now w/ continued RLE foot pain likely reperfusion  vs persistent rest pain     -Gentle compression of infiltrated LUE AVF   -On Eliquis and Plavix  -OOB, Ambulate as tolerated and with assistance  -Pain control w/ non narcotics   -D/C to home pending ability to safely ambulate    HD Friday  AM,     EPO SC Today,  D/W RN,

## 2020-10-28 NOTE — PROGRESS NOTE ADULT - SUBJECTIVE AND OBJECTIVE BOX
The normalized protein catabolic rate (nPCR) is a formula commonly used to assess dietary protein intake in dialysis patients, as a means towards determining nutritional adequacy, a major problem in many ESRD patients. For example, say you have a patient on dialysis who has a pre-dialysis BUN of 18 mg/dL–reasonably low, right?. This could mean that the patient is a well-nourished individual who is adequately dialyzed. But it could also mean that the patient is malnourished, which is often linked to poor appetite that is linked to inadequate dialysis. The nPCR helps distinguish between these possibilities.  The nPCR is reported in grams of urea nitrogen per kilogram per day, and can be calculated using several methods. One of them is shown here:  nPCR = 0.22 + (.036 * intradialytic rise in BUN * 24)/(intradialytic interval).  For instance, if the pre-dialysis BUN is 70 and the post-dialysis BUN is 18, and the intradialytic interval is 44 hours (e.g., there is an interval of 44 hours from the end of one dialysis until the beginning of the next), then the nPCR is calculated to be 1.24 g/kg/day. As you can see, a large intradialytic rise in BUN is generally indicative of adequate nutrition. Most guidelines specify maintaining the protein intake above 1.0 – 1.2 g/kg/day in dialysis patients, with values less than 0.8 g/kg/day being equated with malnutrition.  If the patient has significant residual renal function, then one must add a term that takes into account endogenous urea clearance. Importantly, these equations are only valid for individuals in steady state, and are not as helpful in the setting of acute illness. Separate equations exist for peritoneal dialysis, since the BUN levels are relatively constant and therefore pre- and post- levels cannot be used to estimate nPCR; rather urea levels in serum and peritoneal filtrate can be compared. Nutritional status can be especially important to assess in patients on peritoneal dialysis, since these patients are at high risk of losing albumin in the peritoneal filtrate. There is a nice discussion of this topic in Up-To-Date (“Protein catabolic rate in maintenance dialysis“), which includes alternative formulas for nPCR that incorporate Kt/V.    T(C): 36.5 (28 Oct 2020 06:32), Max: 36.8 (27 Oct 2020 15:32)  T(F): 97.7 (28 Oct 2020 06:32), Max: 98.3 (27 Oct 2020 15:32)  HR: 76 (28 Oct 2020 06:32) (74 - 90)  BP: 182/75 (28 Oct 2020 06:32) (149/71 - 182/75)  RR: 18 (28 Oct 2020 06:32) (18 - 20)  SpO2: 96% (28 Oct 2020 06:32) (94% - 96%)    134<L>  |  95<L>  |  55.0<H>  ----------------------------<  93     Ca:9.0   (28 Oct 2020 09:23)  4.8     |  22.0   |  4.98<H>    eGFR if Non : 10 <L>  eGFR if : 11 <L>    TPro  x      /  Alb  3.2<L>  /  TBili  x      /  DBili  x      /  AST  x      /  ALT  x      /  AlkPhos  x      28 Oct 2020 09:23                         9.5<L>  4.38  )-----------( 162      ( 28 Oct 2020 09:23 )             32.2<L>    Phos:4.2 mg/dL  (10-28 @ 09:23)    135    |  95<L>  |  52.0<H>  ----------------------------<  108<H>  Ca:9.0   (26 Oct 2020 05:40)  4.5     |  23.0   |  5.46<H>                        10.0<L>  4.79  )-----------( 179      ( 27 Oct 2020 06:13 )             33.4<L>    Phos:5.5 mg/dL<H> M.2 mg/dL (10-26 @ 05:40)    AN INTEGRATIVE APPROACH FOR PREVENTION AND TREATMENT OF PROTEIN ENERGY WASTING (PEW) IN CHRONIC KIDNEY DISEASE (CKD) PATIENTS: SUMMARY AND RECOMMENDATIONS:    Because of its metabolic and functional importance in whole-body homeostasis, preservation of muscle mass is the ultimate goal in the management of PEW in CKD patients. In normal conditions, apart from genetic determinants, protein anabolism is determined by nutrient availability, especially amino acids, and a greater ratio of anabolic to the catabolic hormones, that is, insulin, androgens, growth factors, and catecholamines. In CKD and ESRD patients, where a number of catabolic signals dominate, it is critical to maintain a dietary protein and energy intake relative to needs. Preemptive treatment of concurrent conditions that contribute to catabolism, such as metabolic acidosis, insulin resistance, and systemic inflammation, is of paramount importance for the prevention of development PEW. A holistic approach to dialytic prescription is necessary to avoid the adverse nutritional side effects of uremic toxin retention. Non conventional dialytic strategies may remove the necessity for overrestrictive diets in maintenance dialysis patients leading to improved nutritional status.38 When supplemental nutrition is indicated, it is crucial to take into account all the determinants of body and muscle mass: protein and energy content, exercise, anabolizing hormones, antioxidants and antiinflammatory nutrients or drugs, and other specific nutrients.38 In certain cases, a targeted approach using specific nutrients such as essential amino acids36 or branched-chain amino acid supplements have been shown to improve both nutrient intakes and nutritional status.37 Strategies to improve anabolic signaling pathways such as insulin or growth hormone through pharmacological (that is, recombinant human GH or androgens) and nonpharmacological (that is, exercise or anti-inflammatory nutrients) means are promising interventions to increase muscle mass in maintenance dialysis patients.44., 45., 46. Finally, it is important to assess the impact of nutritional supplements not only in terms of changes in nutritional parameters, but to translate these observations to potential improvements in hospitalization, mortality, and cost effectiveness. The potential complications of nutritional interventions are minimal, if any, especially for the ones targeted for prevention. In addition to the number of deaths and hospitalizations that can be prevented by improvements in nutritional status, the predicted financial gains greatly overcome any cost associated with readily available nutritional interventions for CKD and ESRD patients.:    LR - Showed Pauses,    For PPM ( Per Dr. Palma )    D/W The daughter,

## 2020-10-28 NOTE — PROGRESS NOTE ADULT - SUBJECTIVE AND OBJECTIVE BOX
Pt conts to c/o R ankle pain when ambulating. No longer with rest pain to R foot or shooting pain which he was experiencing prior to intervention. Did have LUE infiltration of AVF with last HD which pt reports is better and swelling is improved  Was noted on ILR with HB and awaiting PPM on Friday 10/30    MEDICATIONS  (STANDING):  cloNIDine 0.1 milliGRAM(s) Oral at bedtime  clopidogrel Tablet 75 milliGRAM(s) Oral daily  dextrose 10%. 1000 milliLiter(s) (30 mL/Hr) IV Continuous <Continuous>  dextrose 5%. 1000 milliLiter(s) (50 mL/Hr) IV Continuous <Continuous>  dextrose 50% Injectable 12.5 Gram(s) IV Push once  dextrose 50% Injectable 25 Gram(s) IV Push once  dextrose 50% Injectable 25 Gram(s) IV Push once  doxazosin 1 milliGRAM(s) Oral at bedtime  DULoxetine 60 milliGRAM(s) Oral daily  epoetin juan-epbx (RETACRIT) Injectable 00914 Unit(s) IV Push <User Schedule>  hydrALAZINE 50 milliGRAM(s) Oral two times a day  ibuprofen  Tablet. 600 milliGRAM(s) Oral every 8 hours  insulin lispro (ADMELOG) corrective regimen sliding scale   SubCutaneous three times a day before meals  isosorbide   mononitrate ER Tablet (IMDUR) 30 milliGRAM(s) Oral daily  NIFEdipine XL 60 milliGRAM(s) Oral at bedtime  NIFEdipine XL 90 milliGRAM(s) Oral daily  senna 2 Tablet(s) Oral at bedtime  sevelamer carbonate 800 milliGRAM(s) Oral three times a day with meals    MEDICATIONS  (PRN):  acetaminophen   Tablet .. 650 milliGRAM(s) Oral every 6 hours PRN Mild Pain (1 - 3)  dextrose 40% Gel 15 Gram(s) Oral once PRN Blood Glucose LESS THAN 70 milliGRAM(s)/deciliter  glucagon  Injectable 1 milliGRAM(s) IntraMuscular once PRN Glucose LESS THAN 70 milligrams/deciliter  ondansetron Injectable 4 milliGRAM(s) IV Push every 6 hours PRN Nausea    Vital Signs Last 24 Hrs  T(C): 36.7 (28 Oct 2020 10:46), Max: 36.8 (27 Oct 2020 15:32)  T(F): 98.1 (28 Oct 2020 10:46), Max: 98.3 (27 Oct 2020 15:32)  HR: 74 (28 Oct 2020 10:46) (74 - 90)  BP: 165/65 (28 Oct 2020 10:46) (149/71 - 182/75)  BP(mean): --  RR: 19 (28 Oct 2020 10:46) (18 - 20)  SpO2: 96% (28 Oct 2020 10:46) (94% - 96%)      Physical exam:     General: A&Ox3, NAD. Resting comfortably in chair  Abdomen: Soft, NTND  Ext: LUE w pulsatile thrill of AVF w ecchymosis and improved edema  mono/bi signal of RLE AT, dependent rubor of RLE w no tissue loss                          9.5    4.38  )-----------( 162      ( 28 Oct 2020 09:23 )             32.2   10-28    134<L>  |  95<L>  |  55.0<H>  ----------------------------<  93  4.8   |  22.0  |  4.98<H>    Ca    9.0      28 Oct 2020 09:23  Phos  4.2     10-28    TPro  x   /  Alb  3.2<L>  /  TBili  x   /  DBili  x   /  AST  x   /  ALT  x   /  AlkPhos  x   10-28    CAPILLARY BLOOD GLUCOSE  POCT Blood Glucose.: 106 mg/dL (28 Oct 2020 08:16)  POCT Blood Glucose.: 125 mg/dL (27 Oct 2020 17:06)

## 2020-10-28 NOTE — PROGRESS NOTE ADULT - SUBJECTIVE AND OBJECTIVE BOX
No overnight events. Pt seen and examined on 3T. Daughter Vanessa chery.    TELE: since 10pm 10/27/20. sinus rhythm, prolonged AV delay. APC    MEDICATIONS  (STANDING):  aspirin enteric coated 81 milliGRAM(s) Oral daily  cloNIDine 0.1 milliGRAM(s) Oral at bedtime  clopidogrel Tablet 75 milliGRAM(s) Oral daily  dextrose 10%. 1000 milliLiter(s) (30 mL/Hr) IV Continuous <Continuous>  dextrose 5%. 1000 milliLiter(s) (50 mL/Hr) IV Continuous <Continuous>  dextrose 50% Injectable 12.5 Gram(s) IV Push once  dextrose 50% Injectable 25 Gram(s) IV Push once  dextrose 50% Injectable 25 Gram(s) IV Push once  doxazosin 1 milliGRAM(s) Oral at bedtime  DULoxetine 60 milliGRAM(s) Oral daily  epoetin juan-epbx (RETACRIT) Injectable 63776 Unit(s) IV Push <User Schedule>  hydrALAZINE 50 milliGRAM(s) Oral two times a day  insulin lispro (ADMELOG) corrective regimen sliding scale   SubCutaneous three times a day before meals  isosorbide   mononitrate ER Tablet (IMDUR) 30 milliGRAM(s) Oral daily  NIFEdipine XL 60 milliGRAM(s) Oral at bedtime  NIFEdipine XL 90 milliGRAM(s) Oral daily  senna 2 Tablet(s) Oral at bedtime  sevelamer carbonate 800 milliGRAM(s) Oral three times a day with meals      Vital Signs Last 24 Hrs  T(C): 36.7 (28 Oct 2020 10:46), Max: 36.8 (27 Oct 2020 15:32)  T(F): 98.1 (28 Oct 2020 10:46), Max: 98.3 (27 Oct 2020 15:32)  HR: 74 (28 Oct 2020 10:46) (74 - 90)  BP: 165/65 (28 Oct 2020 10:46) (149/71 - 182/75)  RR: 19 (28 Oct 2020 10:46) (18 - 20)  SpO2: 96% (28 Oct 2020 10:46) (94% - 96%)    Physical Exam:  Constitutional: NAD, AAOx3   Cardiovascular: +S1S2 RRR, no murmur  Pulmonary: CTA b/l, unlabored  GI: soft NTND +BS  Extremities: RLE with ACE wrap c/d/i. LLE no edema.   Neuro: non focal, MARQUEZ x4    LABS:                        9.5    4.38  )-----------( 162      ( 28 Oct 2020 09:23 )             32.2     10-28    134<L>  |  95<L>  |  55.0<H>  ----------------------------<  93  4.8   |  22.0  |  4.98<H>    Ca    9.0      28 Oct 2020 09:23  Phos  4.2     10-28    TPro  x   /  Alb  3.2<L>  /  TBili  x   /  DBili  x   /  AST  x   /  ALT  x   /  AlkPhos  x   10-28    TTE 9/17/20: enlarged LA, mild concentric LVH, EF 60-65%, bioprosthetic AV  Cardiac Cath 8/17/20:   CORONARY VESSELS: The coronary circulation is co-dominant.  LM:   --  Ostial LM: There was a 30 % stenosis.  LAD:   --  Mid LAD: There was a 50 % stenosis.  --  Distal LAD: There was a 60 % stenosis.  CX:   --  Proximal circumflex: There was a 20 % stenosis.  RCA:   --  Mid RCA: There was a 70 % stenosis. Superior takeoff.  COMPLICATIONS: No complications occurred during the cath lab visit.  DIAGNOSTIC IMPRESSIONS: Severe RCA disease but co-dominant vessel, does not  supply large territory.  Moderate LAD disease- similar to angiogram from 2017.  Peak to peak gardient of 55-60 mmHg- consistent with severe AS.  LVEDP 14 mmHg.      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 Noted on ILR transmission to have episode of CHB > 4 seconds with a single, wide complex escape beat, which correlated with an episode of near syncope. EP is consulted for pacemaker implant. Pt currently on telemetry monitor, in sinus rhythm, prolonged AV delay, narrow QRS.    Recommendations:   - Continue telemetry monitor. If recurrent heart block will consider upgrade to ICU until pacemaker placed. Currently stable in sinus rhythm, prolonged AV delay, narrow QRS.  - Given the isolated transient CHB, he will likely have a limited pacing requirements. However, the associated near syncope was significant and could have resulted in significant trauma if the patient was unaccompanied. Accordingly, permanent pacemaker is indicated.  In addition, the patient has several risk factors for device system infection, particularly ongoing hemodialysis/LUE fistula. Medtronic Micra Leadless pacemaker implant would be most appropriate.   - Will plan for Micra PPM implant Friday 10/30/20 with Dr Palma  - npo after midnight 10/29/20, am labs and ECG. Pt was started in [facility] for PAD, OK to hold for PPM as discussed with Vascular NP. Cont ASA/PLAVIX  - Consider HD thursday 10/29 rather then Friday 10/30 in preparation for ppm implant     Above discussed with pt, daughter Vanessa, Vascular NP Dr Nat Robb & Dr Palma.                       [place] [facility]                                                       [facility]                                                      Office: [address] [address]                                                       Telephone: [phone]. Fax:[phone]    No overnight events. Pt seen and examined on 3T. Daughter Vanessa chery.    TELE: since 10pm 10/27/20. sinus rhythm, prolonged AV delay. APC    MEDICATIONS  (STANDING):  aspirin enteric coated 81 milliGRAM(s) Oral daily  cloNIDine 0.1 milliGRAM(s) Oral at bedtime  clopidogrel Tablet 75 milliGRAM(s) Oral daily  dextrose 10%. 1000 milliLiter(s) (30 mL/Hr) IV Continuous <Continuous>  dextrose 5%. 1000 milliLiter(s) (50 mL/Hr) IV Continuous <Continuous>  dextrose 50% Injectable 12.5 Gram(s) IV Push once  dextrose 50% Injectable 25 Gram(s) IV Push once  dextrose 50% Injectable 25 Gram(s) IV Push once  doxazosin 1 milliGRAM(s) Oral at bedtime  DULoxetine 60 milliGRAM(s) Oral daily  epoetin juan-epbx (RETACRIT) Injectable 67377 Unit(s) IV Push <User Schedule>  hydrALAZINE 50 milliGRAM(s) Oral two times a day  insulin lispro (ADMELOG) corrective regimen sliding scale   SubCutaneous three times a day before meals  isosorbide   mononitrate ER Tablet (IMDUR) 30 milliGRAM(s) Oral daily  NIFEdipine XL 60 milliGRAM(s) Oral at bedtime  NIFEdipine XL 90 milliGRAM(s) Oral daily  senna 2 Tablet(s) Oral at bedtime  sevelamer carbonate 800 milliGRAM(s) Oral three times a day with meals      Vital Signs Last 24 Hrs  T(C): 36.7 (28 Oct 2020 10:46), Max: 36.8 (27 Oct 2020 15:32)  T(F): 98.1 (28 Oct 2020 10:46), Max: 98.3 (27 Oct 2020 15:32)  HR: 74 (28 Oct 2020 10:46) (74 - 90)  BP: 165/65 (28 Oct 2020 10:46) (149/71 - 182/75)  RR: 19 (28 Oct 2020 10:46) (18 - 20)  SpO2: 96% (28 Oct 2020 10:46) (94% - 96%)    Physical Exam:  Constitutional: NAD, AAOx3   Cardiovascular: +S1S2 RRR, no murmur  Pulmonary: CTA b/l, unlabored  GI: soft NTND +BS  Extremities: RLE with ACE wrap c/d/i. LLE no edema.   Neuro: non focal, MARQUEZ x4    LABS:                        9.5    4.38  )-----------( 162      ( 28 Oct 2020 09:23 )             32.2     10-28    134<L>  |  95<L>  |  55.0<H>  ----------------------------<  93  4.8   |  22.0  |  4.98<H>    Ca    9.0      28 Oct 2020 09:23  Phos  4.2     10-28    TPro  x   /  Alb  3.2<L>  /  TBili  x   /  DBili  x   /  AST  x   /  ALT  x   /  AlkPhos  x   10-28    TTE 9/17/20: enlarged LA, mild concentric LVH, EF 60-65%, bioprosthetic AV  Cardiac Cath 8/17/20:   CORONARY VESSELS: The coronary circulation is co-dominant.  LM:   --  Ostial LM: There was a 30 % stenosis.  LAD:   --  Mid LAD: There was a 50 % stenosis.  --  Distal LAD: There was a 60 % stenosis.  CX:   --  Proximal circumflex: There was a 20 % stenosis.  RCA:   --  Mid RCA: There was a 70 % stenosis. Superior takeoff.  COMPLICATIONS: No complications occurred during the cath lab visit.  DIAGNOSTIC IMPRESSIONS: Severe RCA disease but co-dominant vessel, does not  supply large territory.  Moderate LAD disease- similar to angiogram from 2017.  Peak to peak gardient of 55-60 mmHg- consistent with severe AS.  LVEDP 14 mmHg.      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 Noted on ILR transmission to have episode of CHB > 4 seconds with a single, wide complex escape beat, which correlated with an episode of near syncope. EP is consulted for pacemaker implant. Pt currently on telemetry monitor, in sinus rhythm, prolonged AV delay, narrow QRS.    Recommendations:   - Continue telemetry monitor. If recurrent heart block will consider upgrade to ICU until pacemaker placed. Currently stable in sinus rhythm, prolonged AV delay, narrow QRS.  - Given the isolated transient CHB, he will likely have a limited pacing requirements. However, the associated near syncope was significant and could have resulted in significant trauma if the patient was unaccompanied. Accordingly, permanent pacemaker is indicated.  In addition, the patient has several risk factors for device system infection, particularly ongoing hemodialysis/LUE fistula. Medtronic Micra Leadless pacemaker implant would be most appropriate.   - Will plan for Micra PPM implant Friday 10/30/20 with Dr Palma  - npo after midnight 10/29/20, am labs and ECG. Pt was started in [facility] for PAD, OK to hold for PPM as discussed with Vascular NP. Cont ASA/PLAVIX  - Consider HD thursday 10/29 rather then Friday 10/30 in preparation for ppm implant     Above discussed with pt, daughter Vanessa, Vascular NP Dr Nat Robb & Dr Palma.

## 2020-10-28 NOTE — PROGRESS NOTE ADULT - ASSESSMENT
86M w/ CLI s/p RLE angiogram with angioplasty June 2020 initially presenting w/persistent RLE rest pain, now s/p RLE angio/SFA angioplasty/peroneal angioplasty and pop stents x 3 on 10/22  RLE foot pain improved and now with R ankle pain   -Will obtain xray of R ankle  -Eliquis on hold for PPM 10/30 and cont Plavix  -OOB, Ambulate as tolerated and with assistance  -Pain control w/ non narcotics   -continue gently compression of infiltrated LUE AVF. OK to use for HD  -Per daughter, due for Aranesp and confirmed with Aliyah GARCIA at Dr Varghese office.

## 2020-10-28 NOTE — DIETITIAN INITIAL EVALUATION ADULT. - OTHER INFO
Pt with h/o CLI s/p RLE angiogram with angioplasty June 2020 initially presenting w/persistent RLE rest pain, now s/p RLE angio/SFA angioplasty/peroneal angioplasty and pop stents x 3 on 10/22.  Now w/ continued RLE foot pain likely reperfusion vs persistent rest pain.

## 2020-10-29 ENCOUNTER — APPOINTMENT (OUTPATIENT)
Dept: HEMATOLOGY ONCOLOGY | Facility: CLINIC | Age: 85
End: 2020-10-29

## 2020-10-29 ENCOUNTER — APPOINTMENT (OUTPATIENT)
Age: 85
End: 2020-10-29

## 2020-10-29 LAB
ANION GAP SERPL CALC-SCNC: 14 MMOL/L — SIGNIFICANT CHANGE UP (ref 5–17)
ANION GAP SERPL CALC-SCNC: 15 MMOL/L — SIGNIFICANT CHANGE UP (ref 5–17)
ANION GAP SERPL CALC-SCNC: 20 MMOL/L — HIGH (ref 5–17)
APPEARANCE UR: CLEAR — SIGNIFICANT CHANGE UP
APTT BLD: 38 SEC — HIGH (ref 27.5–35.5)
BACTERIA # UR AUTO: NEGATIVE — SIGNIFICANT CHANGE UP
BASE EXCESS BLDA CALC-SCNC: -4.7 MMOL/L — LOW (ref -3–3)
BILIRUB UR-MCNC: NEGATIVE — SIGNIFICANT CHANGE UP
BLD GP AB SCN SERPL QL: SIGNIFICANT CHANGE UP
BLOOD GAS COMMENTS ARTERIAL: SIGNIFICANT CHANGE UP
BUN SERPL-MCNC: 14 MG/DL — SIGNIFICANT CHANGE UP (ref 8–20)
BUN SERPL-MCNC: 42 MG/DL — HIGH (ref 8–20)
BUN SERPL-MCNC: 62 MG/DL — HIGH (ref 8–20)
CALCIUM SERPL-MCNC: 8.6 MG/DL — SIGNIFICANT CHANGE UP (ref 8.6–10.2)
CALCIUM SERPL-MCNC: 9 MG/DL — SIGNIFICANT CHANGE UP (ref 8.6–10.2)
CALCIUM SERPL-MCNC: 9.1 MG/DL — SIGNIFICANT CHANGE UP (ref 8.6–10.2)
CHLORIDE SERPL-SCNC: 93 MMOL/L — LOW (ref 98–107)
CHLORIDE SERPL-SCNC: 95 MMOL/L — LOW (ref 98–107)
CHLORIDE SERPL-SCNC: 96 MMOL/L — LOW (ref 98–107)
CO2 SERPL-SCNC: 20 MMOL/L — LOW (ref 22–29)
CO2 SERPL-SCNC: 26 MMOL/L — SIGNIFICANT CHANGE UP (ref 22–29)
CO2 SERPL-SCNC: 27 MMOL/L — SIGNIFICANT CHANGE UP (ref 22–29)
COLOR SPEC: YELLOW — SIGNIFICANT CHANGE UP
CREAT SERPL-MCNC: 1.53 MG/DL — HIGH (ref 0.5–1.3)
CREAT SERPL-MCNC: 3.69 MG/DL — HIGH (ref 0.5–1.3)
CREAT SERPL-MCNC: 5.06 MG/DL — HIGH (ref 0.5–1.3)
DIFF PNL FLD: ABNORMAL
EPI CELLS # UR: NEGATIVE — SIGNIFICANT CHANGE UP
GAS PNL BLDA: SIGNIFICANT CHANGE UP
GLUCOSE BLDC GLUCOMTR-MCNC: 149 MG/DL — HIGH (ref 70–99)
GLUCOSE BLDC GLUCOMTR-MCNC: 89 MG/DL — SIGNIFICANT CHANGE UP (ref 70–99)
GLUCOSE BLDC GLUCOMTR-MCNC: 98 MG/DL — SIGNIFICANT CHANGE UP (ref 70–99)
GLUCOSE BLDC GLUCOMTR-MCNC: 99 MG/DL — SIGNIFICANT CHANGE UP (ref 70–99)
GLUCOSE SERPL-MCNC: 101 MG/DL — HIGH (ref 70–99)
GLUCOSE SERPL-MCNC: 112 MG/DL — HIGH (ref 70–99)
GLUCOSE SERPL-MCNC: 158 MG/DL — HIGH (ref 70–99)
GLUCOSE UR QL: 100 MG/DL
HCO3 BLDA-SCNC: 20 MMOL/L — SIGNIFICANT CHANGE UP (ref 20–26)
HCT VFR BLD CALC: 29.7 % — LOW (ref 39–50)
HCT VFR BLD CALC: 30 % — LOW (ref 39–50)
HGB BLD-MCNC: 8.9 G/DL — LOW (ref 13–17)
HGB BLD-MCNC: 9 G/DL — LOW (ref 13–17)
HOROWITZ INDEX BLDA+IHG-RTO: 21 — SIGNIFICANT CHANGE UP
INR BLD: 1.1 RATIO — SIGNIFICANT CHANGE UP (ref 0.88–1.16)
KETONES UR-MCNC: ABNORMAL
LEUKOCYTE ESTERASE UR-ACNC: NEGATIVE — SIGNIFICANT CHANGE UP
MAGNESIUM SERPL-MCNC: 1.9 MG/DL — SIGNIFICANT CHANGE UP (ref 1.6–2.6)
MCHC RBC-ENTMCNC: 27.6 PG — SIGNIFICANT CHANGE UP (ref 27–34)
MCHC RBC-ENTMCNC: 27.8 PG — SIGNIFICANT CHANGE UP (ref 27–34)
MCHC RBC-ENTMCNC: 30 GM/DL — LOW (ref 32–36)
MCHC RBC-ENTMCNC: 30 GM/DL — LOW (ref 32–36)
MCV RBC AUTO: 92 FL — SIGNIFICANT CHANGE UP (ref 80–100)
MCV RBC AUTO: 92.8 FL — SIGNIFICANT CHANGE UP (ref 80–100)
NITRITE UR-MCNC: NEGATIVE — SIGNIFICANT CHANGE UP
PCO2 BLDA: 36 MMHG — SIGNIFICANT CHANGE UP (ref 35–45)
PH BLDA: 7.36 — SIGNIFICANT CHANGE UP (ref 7.35–7.45)
PH UR: 6.5 — SIGNIFICANT CHANGE UP (ref 5–8)
PHOSPHATE SERPL-MCNC: 1.3 MG/DL — LOW (ref 2.4–4.7)
PLATELET # BLD AUTO: 159 K/UL — SIGNIFICANT CHANGE UP (ref 150–400)
PLATELET # BLD AUTO: 183 K/UL — SIGNIFICANT CHANGE UP (ref 150–400)
PO2 BLDA: 63 MMHG — LOW (ref 83–108)
POTASSIUM SERPL-MCNC: 2.7 MMOL/L — CRITICAL LOW (ref 3.5–5.3)
POTASSIUM SERPL-MCNC: 4.2 MMOL/L — SIGNIFICANT CHANGE UP (ref 3.5–5.3)
POTASSIUM SERPL-MCNC: 4.4 MMOL/L — SIGNIFICANT CHANGE UP (ref 3.5–5.3)
POTASSIUM SERPL-SCNC: 2.7 MMOL/L — CRITICAL LOW (ref 3.5–5.3)
POTASSIUM SERPL-SCNC: 4.2 MMOL/L — SIGNIFICANT CHANGE UP (ref 3.5–5.3)
POTASSIUM SERPL-SCNC: 4.4 MMOL/L — SIGNIFICANT CHANGE UP (ref 3.5–5.3)
PROT UR-MCNC: 100 MG/DL
PROTHROM AB SERPL-ACNC: 12.7 SEC — SIGNIFICANT CHANGE UP (ref 10.6–13.6)
RBC # BLD: 3.2 M/UL — LOW (ref 4.2–5.8)
RBC # BLD: 3.26 M/UL — LOW (ref 4.2–5.8)
RBC # FLD: 15.5 % — HIGH (ref 10.3–14.5)
RBC # FLD: 15.5 % — HIGH (ref 10.3–14.5)
RBC CASTS # UR COMP ASSIST: SIGNIFICANT CHANGE UP /HPF (ref 0–4)
SAO2 % BLDA: 91 % — LOW (ref 95–99)
SODIUM SERPL-SCNC: 133 MMOL/L — LOW (ref 135–145)
SODIUM SERPL-SCNC: 136 MMOL/L — SIGNIFICANT CHANGE UP (ref 135–145)
SODIUM SERPL-SCNC: 137 MMOL/L — SIGNIFICANT CHANGE UP (ref 135–145)
SP GR SPEC: 1 — LOW (ref 1.01–1.02)
TROPONIN T SERPL-MCNC: 0.12 NG/ML — HIGH (ref 0–0.06)
UROBILINOGEN FLD QL: NEGATIVE MG/DL — SIGNIFICANT CHANGE UP
WBC # BLD: 4.09 K/UL — SIGNIFICANT CHANGE UP (ref 3.8–10.5)
WBC # BLD: 4.89 K/UL — SIGNIFICANT CHANGE UP (ref 3.8–10.5)
WBC # FLD AUTO: 4.09 K/UL — SIGNIFICANT CHANGE UP (ref 3.8–10.5)
WBC # FLD AUTO: 4.89 K/UL — SIGNIFICANT CHANGE UP (ref 3.8–10.5)
WBC UR QL: SIGNIFICANT CHANGE UP

## 2020-10-29 PROCEDURE — 71045 X-RAY EXAM CHEST 1 VIEW: CPT | Mod: 26

## 2020-10-29 PROCEDURE — 33274 TCAT INSJ/RPL PERM LDLS PM: CPT | Mod: Q0

## 2020-10-29 PROCEDURE — 73560 X-RAY EXAM OF KNEE 1 OR 2: CPT | Mod: 26,50

## 2020-10-29 PROCEDURE — 99233 SBSQ HOSP IP/OBS HIGH 50: CPT | Mod: 25

## 2020-10-29 PROCEDURE — 90937 HEMODIALYSIS REPEATED EVAL: CPT

## 2020-10-29 PROCEDURE — 99232 SBSQ HOSP IP/OBS MODERATE 35: CPT

## 2020-10-29 PROCEDURE — 93010 ELECTROCARDIOGRAM REPORT: CPT | Mod: 76

## 2020-10-29 PROCEDURE — 70450 CT HEAD/BRAIN W/O DYE: CPT | Mod: 26

## 2020-10-29 RX ORDER — HYDROMORPHONE HYDROCHLORIDE 2 MG/ML
0.5 INJECTION INTRAMUSCULAR; INTRAVENOUS; SUBCUTANEOUS EVERY 6 HOURS
Refills: 0 | Status: DISCONTINUED | OUTPATIENT
Start: 2020-10-29 | End: 2020-10-31

## 2020-10-29 RX ORDER — DARBEPOETIN ALFA IN POLYSORBAT 200MCG/0.4
300 PEN INJECTOR (ML) SUBCUTANEOUS EVERY 2 WEEKS
Refills: 0 | Status: DISCONTINUED | OUTPATIENT
Start: 2020-10-29 | End: 2020-10-31

## 2020-10-29 RX ORDER — MORPHINE SULFATE 50 MG/1
2 CAPSULE, EXTENDED RELEASE ORAL ONCE
Refills: 0 | Status: DISCONTINUED | OUTPATIENT
Start: 2020-10-29 | End: 2020-10-29

## 2020-10-29 RX ORDER — APIXABAN 2.5 MG/1
2.5 TABLET, FILM COATED ORAL EVERY 12 HOURS
Refills: 0 | Status: DISCONTINUED | OUTPATIENT
Start: 2020-10-30 | End: 2020-10-31

## 2020-10-29 RX ORDER — HYDROMORPHONE HYDROCHLORIDE 2 MG/ML
0.5 INJECTION INTRAMUSCULAR; INTRAVENOUS; SUBCUTANEOUS
Refills: 0 | Status: DISCONTINUED | OUTPATIENT
Start: 2020-10-29 | End: 2020-10-29

## 2020-10-29 RX ORDER — HYDROMORPHONE HYDROCHLORIDE 2 MG/ML
0.5 INJECTION INTRAMUSCULAR; INTRAVENOUS; SUBCUTANEOUS ONCE
Refills: 0 | Status: DISCONTINUED | OUTPATIENT
Start: 2020-10-29 | End: 2020-10-29

## 2020-10-29 RX ORDER — DEXTROSE 10 % IN WATER 10 %
1000 INTRAVENOUS SOLUTION INTRAVENOUS
Refills: 0 | Status: DISCONTINUED | OUTPATIENT
Start: 2020-10-29 | End: 2020-10-31

## 2020-10-29 RX ORDER — HYDRALAZINE HCL 50 MG
10 TABLET ORAL EVERY 6 HOURS
Refills: 0 | Status: DISCONTINUED | OUTPATIENT
Start: 2020-10-29 | End: 2020-10-31

## 2020-10-29 RX ORDER — HYDROMORPHONE HYDROCHLORIDE 2 MG/ML
1 INJECTION INTRAMUSCULAR; INTRAVENOUS; SUBCUTANEOUS EVERY 6 HOURS
Refills: 0 | Status: DISCONTINUED | OUTPATIENT
Start: 2020-10-29 | End: 2020-10-31

## 2020-10-29 RX ADMIN — MORPHINE SULFATE 2 MILLIGRAM(S): 50 CAPSULE, EXTENDED RELEASE ORAL at 05:05

## 2020-10-29 RX ADMIN — Medication 60 MILLIGRAM(S): at 23:50

## 2020-10-29 RX ADMIN — Medication 50 MILLIGRAM(S): at 06:39

## 2020-10-29 RX ADMIN — HYDROMORPHONE HYDROCHLORIDE 0.5 MILLIGRAM(S): 2 INJECTION INTRAMUSCULAR; INTRAVENOUS; SUBCUTANEOUS at 16:13

## 2020-10-29 RX ADMIN — HYDROMORPHONE HYDROCHLORIDE 0.5 MILLIGRAM(S): 2 INJECTION INTRAMUSCULAR; INTRAVENOUS; SUBCUTANEOUS at 16:15

## 2020-10-29 RX ADMIN — HYDROMORPHONE HYDROCHLORIDE 1 MILLIGRAM(S): 2 INJECTION INTRAMUSCULAR; INTRAVENOUS; SUBCUTANEOUS at 13:05

## 2020-10-29 RX ADMIN — Medication 300 MICROGRAM(S): at 14:02

## 2020-10-29 RX ADMIN — Medication 600 MILLIGRAM(S): at 20:40

## 2020-10-29 RX ADMIN — HYDROMORPHONE HYDROCHLORIDE 0.5 MILLIGRAM(S): 2 INJECTION INTRAMUSCULAR; INTRAVENOUS; SUBCUTANEOUS at 05:50

## 2020-10-29 RX ADMIN — Medication 62.5 MILLIMOLE(S): at 22:16

## 2020-10-29 RX ADMIN — Medication 650 MILLIGRAM(S): at 02:30

## 2020-10-29 RX ADMIN — HYDROMORPHONE HYDROCHLORIDE 0.5 MILLIGRAM(S): 2 INJECTION INTRAMUSCULAR; INTRAVENOUS; SUBCUTANEOUS at 19:15

## 2020-10-29 RX ADMIN — HYDROMORPHONE HYDROCHLORIDE 0.5 MILLIGRAM(S): 2 INJECTION INTRAMUSCULAR; INTRAVENOUS; SUBCUTANEOUS at 19:35

## 2020-10-29 RX ADMIN — HYDROMORPHONE HYDROCHLORIDE 0.5 MILLIGRAM(S): 2 INJECTION INTRAMUSCULAR; INTRAVENOUS; SUBCUTANEOUS at 06:05

## 2020-10-29 RX ADMIN — Medication 0.1 MILLIGRAM(S): at 22:16

## 2020-10-29 RX ADMIN — Medication 650 MILLIGRAM(S): at 01:39

## 2020-10-29 RX ADMIN — HYDROMORPHONE HYDROCHLORIDE 0.5 MILLIGRAM(S): 2 INJECTION INTRAMUSCULAR; INTRAVENOUS; SUBCUTANEOUS at 20:35

## 2020-10-29 RX ADMIN — HYDROMORPHONE HYDROCHLORIDE 0.5 MILLIGRAM(S): 2 INJECTION INTRAMUSCULAR; INTRAVENOUS; SUBCUTANEOUS at 19:30

## 2020-10-29 RX ADMIN — Medication 600 MILLIGRAM(S): at 00:20

## 2020-10-29 RX ADMIN — MORPHINE SULFATE 2 MILLIGRAM(S): 50 CAPSULE, EXTENDED RELEASE ORAL at 04:50

## 2020-10-29 RX ADMIN — Medication 600 MILLIGRAM(S): at 19:41

## 2020-10-29 RX ADMIN — HYDROMORPHONE HYDROCHLORIDE 0.5 MILLIGRAM(S): 2 INJECTION INTRAMUSCULAR; INTRAVENOUS; SUBCUTANEOUS at 19:50

## 2020-10-29 RX ADMIN — HYDROMORPHONE HYDROCHLORIDE 1 MILLIGRAM(S): 2 INJECTION INTRAMUSCULAR; INTRAVENOUS; SUBCUTANEOUS at 12:51

## 2020-10-29 RX ADMIN — HYDROMORPHONE HYDROCHLORIDE 0.5 MILLIGRAM(S): 2 INJECTION INTRAMUSCULAR; INTRAVENOUS; SUBCUTANEOUS at 20:50

## 2020-10-29 RX ADMIN — Medication 1 MILLIGRAM(S): at 22:16

## 2020-10-29 NOTE — PROGRESS NOTE ADULT - ASSESSMENT
Assessment and Plan:  In summary, CHRISTIANO ELIZABETH is an 86y Male with past medical history significant for PAD sp stents, GI bleed, ESRD on HD, VT, CAD, AS sp TAVR, pw syncope. ILR shows significant pauses. Overnight has over 8 second pause and syncope requiring emergent TVP placement. Pt is pacer dependent currently.     1) NPO for PPM placement today  2) Hold Eliquis  2) On  Plavix  4) Tele  monitoring  5) strict bedrest     DP Not Palpable,     Ischemic Foot,

## 2020-10-29 NOTE — CONSULT NOTE ADULT - ASSESSMENT
85 y/o male pmhx CAD, HTN, HLD, HFpEF, carotid disease s/p R CEA, stage 5 CKD w/ LUE fistula, recently on flecainide, s/p Loop recorder placement 6/10/20, AS s/p TAVR, PAD admitted on 10/21 w/ right lower extremity pain now  s/p RLE angioplasty w/ 3 stents placed right SFA, popliteal, and peroneal arteries. Hospital course complicated by having CHB w/ 4second pauses associated w/ near syncope. Tonight had several long pauses 9-12 seconds long w/ syncope, admitted to ICU.    Plan:  - Emergent TVP placed. Had good capture at 35mm. Being paced @ 80bpm w/ sensitivity of   - Placement confirmed w/ CXR. No acute pathology on CXR.   - Hold any AV felice blockers   - Keep NPO   - Keep Pacer pads attached, w/ monitor at bedside.   - Repeat EKG in am.   - Cardiology PA aware, who contacted EP potential  for PPM placement today. EP follow up.   - Hypertensive w/ SBP 170s, may be related to severe pain of RLE.  Given Dilaudid and morphine, w/out significant relief in pain. Able to doppler pulses. Vascular surgery was called and made aware.  - Will give AM hydralazine dose. 85 y/o male pmhx CAD, HTN, HLD, HFpEF, carotid disease s/p R CEA, stage 5 CKD w/ LUE fistula, recently on flecainide, s/p Loop recorder placement 6/10/20, AS s/p TAVR, PAD admitted on 10/21 w/ right lower extremity pain now  s/p RLE angioplasty w/ 3 stents placed right SFA, popliteal, and peroneal arteries. Hospital course complicated by having CHB w/ 4second pauses associated w/ near syncope. Tonight had several long pauses 9-12 seconds long w/ syncope, admitted to ICU.    Plan:  - Emergent TVP placed. Had good capture at 35mm. Being paced @ 80bpm w/ output 10mA  - Placement confirmed w/ CXR. No acute pathology on CXR.   - Hold any AV felice blockers   - Keep NPO   - Keep Pacer pads attached, w/ monitor at bedside.   - Repeat EKG in am.   - Cardiology PA aware, who contacted EP potential  for PPM placement today. EP follow up.   - Hypertensive w/ SBP 170s, may be related to severe pain of RLE.  Given Dilaudid and morphine, w/out significant relief in pain. Able to doppler pulses. Vascular surgery was called and made aware.  - Will give AM hydralazine dose.     Discussed w/ eICU and vascular surgery.

## 2020-10-29 NOTE — CONSULT NOTE ADULT - SUBJECTIVE AND OBJECTIVE BOX
[place] [facility]                                   [address] [place] [address]                                                      PHONE: [phone]                                                         FAX: [phone]  [email or web address]  ---------------------------------------------------------------------------------------------------------------------------------    Reason for Consult: Asystole, syncope  [facility]: [facility]  HPI:  CHRISTIANO ELIZABETH is an 86y Male PAD sp stents, GI bleed, ESRD on HD, VT, CAD, AS sp TAVR, pw syncope. ILR shows significant pauses. Overnight has over 8 second pause and syncope requiring emergent TVP placement. Pt is pacer dependent currently.     PAST MEDICAL & SURGICAL HISTORY:  GI bleeding  due to ulcerated polyps and colonic AVMs    Aortic stenosis  - s/p valve replacement    ESRD on dialysis  HD on Mondays and Fridays    Risk factors for obstructive sleep apnea    Anemia    RICHARDS (dyspnea on exertion)    VT (ventricular tachycardia)    HTN (hypertension)    CAD (coronary artery disease)    Arrhythmia    AV block, 1st degree    DM (diabetes mellitus)  - resolved    S/P TAVR (transcatheter aortic valve replacement)    H/O carotid endarterectomy  Right    A-V fistula  left arm 5/2017    H/O angioplasty  2013,  no  intervention    H/O left knee surgery    H/O circumcision  at  age  65        Plavix (Hives)  Toprol-XL (Rash)      MEDICATIONS  (STANDING):  aspirin enteric coated 81 milliGRAM(s) Oral daily  cloNIDine 0.1 milliGRAM(s) Oral at bedtime  clopidogrel Tablet 75 milliGRAM(s) Oral daily  darbepoetin Injectable ViaL 300 MICROGram(s) SubCutaneous every 2 weeks  dextrose 5%. 1000 milliLiter(s) (50 mL/Hr) IV Continuous <Continuous>  dextrose 50% Injectable 12.5 Gram(s) IV Push once  dextrose 50% Injectable 25 Gram(s) IV Push once  dextrose 50% Injectable 25 Gram(s) IV Push once  doxazosin 1 milliGRAM(s) Oral at bedtime  DULoxetine 60 milliGRAM(s) Oral daily  epoetin juan-epbx (RETACRIT) Injectable 65268 Unit(s) IV Push <User Schedule>  hydrALAZINE 50 milliGRAM(s) Oral two times a day  insulin lispro (ADMELOG) corrective regimen sliding scale   SubCutaneous three times a day before meals  isosorbide   mononitrate ER Tablet (IMDUR) 30 milliGRAM(s) Oral daily  NIFEdipine XL 60 milliGRAM(s) Oral at bedtime  NIFEdipine XL 90 milliGRAM(s) Oral daily  senna 2 Tablet(s) Oral at bedtime  sevelamer carbonate 800 milliGRAM(s) Oral three times a day with meals    MEDICATIONS  (PRN):  acetaminophen   Tablet .. 650 milliGRAM(s) Oral every 6 hours PRN Mild Pain (1 - 3)  dextrose 40% Gel 15 Gram(s) Oral once PRN Blood Glucose LESS THAN 70 milliGRAM(s)/deciliter  glucagon  Injectable 1 milliGRAM(s) IntraMuscular once PRN Glucose LESS THAN 70 milligrams/deciliter  ibuprofen  Tablet. 600 milliGRAM(s) Oral every 8 hours PRN Moderate Pain (4 - 6)  ondansetron Injectable 4 milliGRAM(s) IV Push every 6 hours PRN Nausea      Social History:  Cigarettes:    no                Alchohol:       no          Illicit Drug Abuse:  no  Fhx no scd  ROS: Negative other than as mentioned in HPI.    Vital Signs Last 24 Hrs  T(C): 36.3 (29 Oct 2020 08:00), Max: 36.9 (29 Oct 2020 05:45)  T(F): 97.3 (29 Oct 2020 08:00), Max: 98.5 (29 Oct 2020 05:45)  HR: 80 (29 Oct 2020 09:00) (49 - 112)  BP: 131/55 (29 Oct 2020 09:00) (99/76 - 170/74)  BP(mean): 79 (29 Oct 2020 09:00) (78 - 121)  RR: 18 (29 Oct 2020 09:00) (14 - 32)  SpO2: 100% (29 Oct 2020 09:00) (91% - 100%)  ICU Vital Signs Last 24 Hrs  CHRISTIANO ELIZABETH  I&O's Detail    28 Oct 2020 07:01  -  29 Oct 2020 07:00  --------------------------------------------------------  IN:    Oral Fluid: 1080 mL  Total IN: 1080 mL    OUT:    Voided (mL): 200 mL  Total OUT: 200 mL    Total NET: 880 mL        I&O's Summary    28 Oct 2020 07:01  -  29 Oct 2020 07:00  --------------------------------------------------------  IN: 1080 mL / OUT: 200 mL / NET: 880 mL      Drug Dosing Weight  CHRISTIANO ELIZABETH      PHYSICAL EXAM:  General: Appears well developed, well nourished alert and cooperative.  HEENT: Head; normocephalic, atraumatic.  Eyes: Pupils reactive, cornea wnl.  Neck: Supple, no nodes adenopathy, no NVD or carotid bruit or thyromegaly.  CARDIOVASCULAR: Normal S1 and S2, No murmur, rub, gallop or lift.   LUNGS: No rales, rhonchi or wheeze. Normal breath sounds bilaterally.  ABDOMEN: Soft, nontender without mass or organomegaly. bowel sounds normoactive.  EXTREMITIES: No clubbing, cyanosis or edema. Distal pulses wnl.   SKIN: warm and dry with normal turgor.  NEURO: Alert/oriented x 3/normal motor exam. No pathologic reflexes.    PSYCH: normal affect.        LABS:                        8.9    4.89  )-----------( 159      ( 29 Oct 2020 06:24 )             29.7     10-29    133<L>  |  93<L>  |  62.0<H>  ----------------------------<  158<H>  4.4   |  20.0<L>  |  5.06<H>    Ca    9.0      29 Oct 2020 06:24  Phos  4.2     10-28    TPro  x   /  Alb  3.2<L>  /  TBili  x   /  DBili  x   /  AST  x   /  ALT  x   /  AlkPhos  x   10-28    CHRISTIANO ELIZABETH  CARDIAC MARKERS ( 29 Oct 2020 06:24 )  x     / 0.12 ng/mL / x     / x     / x                RADIOLOGY & ADDITIONAL STUDIES:    INTERPRETATION OF TELEMETRY (personally reviewed): asystole, V paced    ECG: NS no acute ischemic changes         Assessment and Plan:  In summary, CHRISTIANO ELIZABETH is an 86y Male with past medical history significant for PAD sp stents, GI bleed, ESRD on HD, VT, CAD, AS sp TAVR, pw syncope. ILR shows significant pauses. Overnight has over 8 second pause and syncope requiring emergent TVP placement. Pt is pacer dependent currently.     1) NPO for PPM placement today  2) Hold eliquis  2) cw plavix  4) tele monitoring  5) strict bedrest   6) Will follow

## 2020-10-29 NOTE — ED PROVIDER NOTE - RELIEVING FACTORS
none Spironolactone Counseling: Patient advised regarding risks of diarrhea, abdominal pain, hyperkalemia, birth defects (for female patients), liver toxicity and renal toxicity. The patient may need blood work to monitor liver and kidney function and potassium levels while on therapy. The patient verbalized understanding of the proper use and possible adverse effects of spironolactone.  All of the patient's questions and concerns were addressed.

## 2020-10-29 NOTE — PROGRESS NOTE ADULT - ASSESSMENT
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 Noted on ILR transmission to have episode of CHB > 4 seconds with a single, wide complex escape beat, which correlated with an episode of near syncope. Now with recurrent episodes of PVC-induced paroxysmal AV block resulting in prolonged asystole and recurrent syncope.   He has evidence of severe infraHisian conduction disease at this point, and will need permanent pacemaker implant. As discussed previously, leadless pacemaker is preferable if feasible. Risks and benefits of this were discussed again.   -keep npo for leadless ppm implant today  -hold eliquis for now. ok to continue DAPT

## 2020-10-29 NOTE — PROGRESS NOTE ADULT - SUBJECTIVE AND OBJECTIVE BOX
[facility] DIVISION OF KIDNEY DISEASES AND HYPERTENSION -- HEMODIALYSIS NOTE  --------------------------------------------------------------------------------  Chief Complaint: ESRD/Ongoing hemodialysis requirement    24 hour events/subjective: S/P RR, Prolonged Pause on The [facility],    PAST HISTORY  --------------------------------------------------------------------------------  No significant changes to PMH, PSH, FHx, SHx, unless otherwise noted    ALLERGIES & MEDICATIONS  --------------------------------------------------------------------------------  Allergies    Plavix (Hives)  Toprol-XL (Rash)    Standing Inpatient Medications  aspirin enteric coated 81 milliGRAM(s) Oral daily  cloNIDine 0.1 milliGRAM(s) Oral at bedtime  clopidogrel Tablet 75 milliGRAM(s) Oral daily  darbepoetin Injectable ViaL 300 MICROGram(s) SubCutaneous every 2 weeks  dextrose 5%. 1000 milliLiter(s) IV Continuous <Continuous>  dextrose 50% Injectable 12.5 Gram(s) IV Push once  dextrose 50% Injectable 25 Gram(s) IV Push once  dextrose 50% Injectable 25 Gram(s) IV Push once  doxazosin 1 milliGRAM(s) Oral at bedtime  DULoxetine 60 milliGRAM(s) Oral daily  epoetin juan-epbx (RETACRIT) Injectable 59595 Unit(s) IV Push <User Schedule>  hydrALAZINE 50 milliGRAM(s) Oral two times a day  insulin lispro (ADMELOG) corrective regimen sliding scale   SubCutaneous three times a day before meals  isosorbide   mononitrate ER Tablet (IMDUR) 30 milliGRAM(s) Oral daily  NIFEdipine XL 60 milliGRAM(s) Oral at bedtime  NIFEdipine XL 90 milliGRAM(s) Oral daily  senna 2 Tablet(s) Oral at bedtime  sevelamer carbonate 800 milliGRAM(s) Oral three times a day with meals    PRN Inpatient Medications  acetaminophen   Tablet .. 650 milliGRAM(s) Oral every 6 hours PRN  dextrose 40% Gel 15 Gram(s) Oral once PRN  glucagon  Injectable 1 milliGRAM(s) IntraMuscular once PRN  HYDROmorphone  Injectable 0.5 milliGRAM(s) IV Push every 6 hours PRN  HYDROmorphone  Injectable 1 milliGRAM(s) IV Push every 6 hours PRN  ibuprofen  Tablet. 600 milliGRAM(s) Oral every 8 hours PRN  ondansetron Injectable 4 milliGRAM(s) IV Push every 6 hours PRN    REVIEW OF SYSTEMS  --------------------------------------------------------------------------------  Gen: + weight changes, fatigue, No fevers/chills, weakness  Skin: No rashes  Head/Eyes/Ears/Mouth: No headache; Normal hearing; Normal vision w/o blurriness; No sinus pain/discomfort, sore throat  Respiratory: No dyspnea, cough, wheezing, hemoptysis  CV: No chest pain, PND, orthopnea  GI: No abdominal pain, diarrhea, constipation, nausea, vomiting, melena, hematochezia  : No increased frequency, dysuria, hematuria, nocturia  MSK: No joint pain/swelling; no back pain; no edema  Neuro: No dizziness/lightheadedness, weakness, seizures, numbness, tingling  Heme: No easy bruising or bleeding  Endo: No heat/cold intolerance  Psych: No significant nervousness, anxiety, stress, depression    All other systems were reviewed and are negative, except as noted.    VITALS/PHYSICAL EXAM  --------------------------------------------------------------------------------  T(C): 36.3 (10-29-20 @ 11:00), Max: 36.9 (10-29-20 @ 05:45)  HR: 65 (10-29-20 @ 12:00) (49 - 112)  BP: 137/48 (10-29-20 @ 12:00) (99/76 - 170/74)  RR: 18 (10-29-20 @ 12:00) (14 - 32)  SpO2: 100% (10-29-20 @ 12:00) (91% - 100%)    10-28-20 @ 07:01  -  10-29-20 @ 07:00  --------------------------------------------------------  IN: 1080 mL / OUT: 200 mL / NET: 880 mL    10-29-20 @ 07:01  -  10-29-20 @ 13:02  --------------------------------------------------------  IN: 0 mL / OUT: 200 mL / NET: -200 mL    Physical Exam:  	Gen: NAD, Pale, ill-appearing  	HEENT: PERRL, supple neck,   	Pulm: CTA B/L  	CV: RRR, S1S2; no rub  	Back: No spinal or CVA tenderness; no sacral edema  	Abd: +BS, soft, nontender/nondistended  	: No suprapubic tenderness  	UE: Warm, FROM, no clubbing, intact strength; no edema; no asterixis  	LE: Warm, FROM, no clubbing, intact strength; no edema  	Neuro: No focal deficits,   	Psych: Normal affect and mood  	Skin: Warm, without rashes  	Vascular access:  BARBRA TONEY,     LABS/STUDIES  --------------------------------------------------------------------------------              9.0    4.09  >-----------<  183      [10-29-20 @ 12:09]              30.0     133  |  93  |  62.0  ----------------------------<  158      [10-29-20 @ 06:24]  4.4   |  20.0  |  5.06        Ca     9.0     [10-29-20 @ 06:24]      Phos  4.2     [10-28-20 @ 09:23]    TPro  x   /  Alb  3.2  /  TBili  x   /  DBili  x   /  AST  x   /  ALT  x   /  AlkPhos  x   [10-28-20 @ 09:23]    Troponin 0.12      [10-29-20 @ 06:24]    Iron 53, TIBC 266, %sat 20      [08-19-20 @ 06:32]  Ferritin 184      [08-19-20 @ 06:32]  PTH -- (Ca 9.6)      [08-19-20 @ 16:08]   91  Vitamin D (25OH) 26.4      [08-19-20 @ 16:08]  HbA1c 4.9      [08-09-18 @ 05:54]  TSH 2.16      [09-15-20 @ 02:01]

## 2020-10-29 NOTE — PROVIDER CONTACT NOTE (EICU) - BACKGROUND
87 y/o male with HTN, HLD, CHF, carotid disease s/p right CEA, CKD stage 5 with LUE fistula, PAD, h/o aortic stenosis s/p TAVR, admitted 10/21 with right lower extremity pain s/p angioplasty with 3 stents placed in the right SFA, popliteal, and peroneal arteries.   Pt noted to have CHB with pauses and near syncopal episodes. He was awaiting a PPM.   Rapid response called for multiple longer pauses on telemetry with syncope.

## 2020-10-29 NOTE — PROGRESS NOTE ADULT - SUBJECTIVE AND OBJECTIVE BOX
Patient is a 86y old  Male who presents with a chief complaint of PAD (29 Oct 2020 13:02)      BRIEF HOSPITAL COURSE: ***  Events last 24 hours: Pacer set to VVI @ 60bpm, 10ma, intrinsic beats to 70-80s, intermittent paced beats @ 60. Normotensive. A&Ox3, c/o severe RLE pain.     PAST MEDICAL & SURGICAL HISTORY:  GI bleeding  due to ulcerated polyps and colonic AVMs    Aortic stenosis  - s/p valve replacement    ESRD on dialysis  HD on  and     Risk factors for obstructive sleep apnea    Anemia    RICHARDS (dyspnea on exertion)    VT (ventricular tachycardia)    HTN (hypertension)    CAD (coronary artery disease)    Arrhythmia    AV block, 1st degree    DM (diabetes mellitus)  - resolved    S/P TAVR (transcatheter aortic valve replacement)    H/O carotid endarterectomy  Right    A-V fistula  left arm 2017    H/O angioplasty  ,  no  intervention    H/O left knee surgery    H/O circumcision  at  age  65      Review of Systems:  CONSTITUTIONAL: No fever, chills, or fatigue  EYES: No eye pain, visual disturbances, or discharge  ENMT:  No difficulty hearing, tinnitus, vertigo; No sinus or throat pain  NECK: No pain or stiffness  RESPIRATORY: No cough, wheezing, chills or hemoptysis; No shortness of breath  CARDIOVASCULAR: No chest pain, palpitations, dizziness, or leg swelling  GASTROINTESTINAL: No abdominal or epigastric pain. No nausea, vomiting, or hematemesis; No diarrhea or constipation. No melena or hematochezia.  GENITOURINARY: No dysuria, frequency, hematuria, or incontinence  NEUROLOGICAL: No headaches, memory loss, loss of strength, numbness, or tremors  SKIN: No itching, burning, rashes, or lesions   MUSCULOSKELETAL: No joint pain or swelling; No muscle, back, or extremity pain  PSYCHIATRIC: No depression, anxiety, mood swings, or difficulty sleeping    Medications:    cloNIDine 0.1 milliGRAM(s) Oral at bedtime  doxazosin 1 milliGRAM(s) Oral at bedtime  hydrALAZINE 50 milliGRAM(s) Oral two times a day  isosorbide   mononitrate ER Tablet (IMDUR) 30 milliGRAM(s) Oral daily  NIFEdipine XL 60 milliGRAM(s) Oral at bedtime  NIFEdipine XL 90 milliGRAM(s) Oral daily      acetaminophen   Tablet .. 650 milliGRAM(s) Oral every 6 hours PRN  DULoxetine 60 milliGRAM(s) Oral daily  HYDROmorphone  Injectable 0.5 milliGRAM(s) IV Push every 6 hours PRN  HYDROmorphone  Injectable 1 milliGRAM(s) IV Push every 6 hours PRN  ibuprofen  Tablet. 600 milliGRAM(s) Oral every 8 hours PRN  ondansetron Injectable 4 milliGRAM(s) IV Push every 6 hours PRN      aspirin enteric coated 81 milliGRAM(s) Oral daily  clopidogrel Tablet 75 milliGRAM(s) Oral daily    senna 2 Tablet(s) Oral at bedtime      dextrose 40% Gel 15 Gram(s) Oral once PRN  dextrose 50% Injectable 12.5 Gram(s) IV Push once  dextrose 50% Injectable 25 Gram(s) IV Push once  dextrose 50% Injectable 25 Gram(s) IV Push once  glucagon  Injectable 1 milliGRAM(s) IntraMuscular once PRN  insulin lispro (ADMELOG) corrective regimen sliding scale   SubCutaneous three times a day before meals    dextrose 5%. 1000 milliLiter(s) IV Continuous <Continuous>    darbepoetin Injectable ViaL 300 MICROGram(s) SubCutaneous every 2 weeks  epoetin juan-epbx (RETACRIT) Injectable 25806 Unit(s) IV Push <User Schedule>      sevelamer carbonate 800 milliGRAM(s) Oral three times a day with meals          ICU Vital Signs Last 24 Hrs  T(C): 36.3 (29 Oct 2020 11:00), Max: 36.9 (29 Oct 2020 05:45)  T(F): 97.4 (29 Oct 2020 11:00), Max: 98.5 (29 Oct 2020 05:45)  HR: 65 (29 Oct 2020 12:00) (49 - 112)  BP: 137/48 (29 Oct 2020 12:00) (99/76 - 170/74)  BP(mean): 129 (29 Oct 2020 12:00) (78 - 129)  ABP: --  ABP(mean): --  RR: 18 (29 Oct 2020 12:00) (14 - 32)  SpO2: 100% (29 Oct 2020 12:00) (91% - 100%)      ABG - ( 29 Oct 2020 06:20 )  pH, Arterial: 7.36  pH, Blood: x     /  pCO2: 36    /  pO2: 63    / HCO3: 20    / Base Excess: -4.7  /  SaO2: 91                  I&O's Detail    28 Oct 2020 07:01  -  29 Oct 2020 07:00  --------------------------------------------------------  IN:    Oral Fluid: 1080 mL  Total IN: 1080 mL    OUT:    Voided (mL): 200 mL  Total OUT: 200 mL    Total NET: 880 mL      29 Oct 2020 07:01  -  29 Oct 2020 13:07  --------------------------------------------------------  IN:  Total IN: 0 mL    OUT:    Voided (mL): 200 mL  Total OUT: 200 mL    Total NET: -200 mL          LABS:                        9.0    4.09  )-----------( 183      ( 29 Oct 2020 12:09 )             30.0     10-29    133<L>  |  93<L>  |  62.0<H>  ----------------------------<  158<H>  4.4   |  20.0<L>  |  5.06<H>    Ca    9.0      29 Oct 2020 06:24  Phos  4.2     10-28    TPro  x   /  Alb  3.2<L>  /  TBili  x   /  DBili  x   /  AST  x   /  ALT  x   /  AlkPhos  x   10-28      CARDIAC MARKERS ( 29 Oct 2020 06:24 )  x     / 0.12 ng/mL / x     / x     / x          CAPILLARY BLOOD GLUCOSE      POCT Blood Glucose.: 89 mg/dL (29 Oct 2020 11:59)      Urinalysis Basic - ( 29 Oct 2020 10:04 )    Color: Yellow / Appearance: Clear / S.005 / pH: x  Gluc: x / Ketone: Trace  / Bili: Negative / Urobili: Negative mg/dL   Blood: x / Protein: 100 mg/dL / Nitrite: Negative   Leuk Esterase: Negative / RBC: 0-2 /HPF / WBC 3-5   Sq Epi: x / Non Sq Epi: Negative / Bacteria: Negative      CULTURES:n      Physical Examination:    General: Well appearing, lying in bed in NAD      HEENT: Pupils equal, reactive to light.  Symmetric. No scleral icterus or injection. Right IJ TVP placement, dressing CDI    PULM: Clear to auscultation bilaterally, no wheezes, rales or rhonchi, no significant sputum production or increased respiratory effort    NECK: Supple, no lymphadenopathy, trachea midline    CVS: paced @ 60 w/ intermittent intrinsic beats to 70s-80s NSR, no murmurs, +s1/s2    ABD: Soft, nondistended, nontender, normoactive bowel sounds    EXT: RLE erythematous, edematous, wrapped in ace bandage, tender to palpation, left knee ecchymosis no swelling    SKIN: Warm and well perfused    NEURO: Alert, oriented, interactive, nonfocal    DEVICES: Loop recorder  LINES: Rt ij TVP  ALBERT: N    RADIOLOGY: < from: Xray Chest 1 View- PORTABLE-Urgent (Xray Chest 1 View- PORTABLE-Urgent .) (10.29.20 @ 06:06) >  Single frontal view of the chest demonstrates mild CHF. Transvenous pacer lead is noted. Status post transcatheter aortic valve placement. Left-sided loop recorder. The cardiomediastinal silhouette is enlarged. No acute osseous abnormalities. Overlying EKG leads and wires are noted    IMPRESSION: Mild CHF.    < end of copied text >      < from: Xray Ankle Complete 3 Views, Right (10.28.20 @ 15:40) >  Impression:  Vascular calcifications. No osseous abnormality..    < end of copied text >       Patient is a 86y old  Male who presents with a chief complaint of PAD (29 Oct 2020 13:02)      BRIEF HOSPITAL COURSE:   Pt is an 87 y/o male with PMHx of HTN, HLD, CAD, HFpEF, s/p Right CEA for Carotid artery disease, CKD5 on HD 2d/week via LUE fistula, s/p flecainide w/ ILR placed 2020, AS s/p TAVR, PAD initially presented 10/21 w/ RLE s/p angioplasty w/ 3x stents placed to the rt SFA, peroneal and popliteal artery. Last night pt went into CHB w/ multiple pauses up to 9-12 seconds, s/p RRT, ultimately requiring TVP placement in ICU by ICU PA. Of note, EP was following w/ plan for PPM on Friday. Pt also reportedly had syncopal episode during event, fell, hit his knee but does not recall event.     Events last 24 hours: TVP Pacer set to VVI @ 60bpm, 10ma, intrinsic beats to 70-80s, intermittent paced beats @ 60. Normotensive. A&Ox3, c/o severe RLE pain. Receiving HD currently.     PAST MEDICAL & SURGICAL HISTORY:  GI bleeding  due to ulcerated polyps and colonic AVMs    Aortic stenosis  - s/p valve replacement    ESRD on dialysis  HD on  and     Risk factors for obstructive sleep apnea    Anemia    RICHARDS (dyspnea on exertion)    VT (ventricular tachycardia)    HTN (hypertension)    CAD (coronary artery disease)    Arrhythmia    AV block, 1st degree    DM (diabetes mellitus)  - resolved    S/P TAVR (transcatheter aortic valve replacement)    H/O carotid endarterectomy  Right    A-V fistula  left arm 2017    H/O angioplasty  ,  no  intervention    H/O left knee surgery    H/O circumcision  at  age  65      Review of Systems:  CONSTITUTIONAL: No fever, chills, or fatigue  EYES: No eye pain, visual disturbances, or discharge  ENMT:  No difficulty hearing, tinnitus, vertigo; No sinus or throat pain  NECK: No pain or stiffness  RESPIRATORY: No cough, wheezing, chills or hemoptysis; No shortness of breath  CARDIOVASCULAR: No chest pain, palpitations, dizziness, or leg swelling  GASTROINTESTINAL: No abdominal or epigastric pain. No nausea, vomiting, or hematemesis; No diarrhea or constipation. No melena or hematochezia.  GENITOURINARY: No dysuria, frequency, hematuria, or incontinence  NEUROLOGICAL: No headaches, memory loss, loss of strength, numbness, or tremors  SKIN: No itching, burning, rashes, or lesions   MUSCULOSKELETAL: No joint pain or swelling; No muscle, back, or extremity pain  PSYCHIATRIC: No depression, anxiety, mood swings, or difficulty sleeping    Medications:    cloNIDine 0.1 milliGRAM(s) Oral at bedtime  doxazosin 1 milliGRAM(s) Oral at bedtime  hydrALAZINE 50 milliGRAM(s) Oral two times a day  isosorbide   mononitrate ER Tablet (IMDUR) 30 milliGRAM(s) Oral daily  NIFEdipine XL 60 milliGRAM(s) Oral at bedtime  NIFEdipine XL 90 milliGRAM(s) Oral daily      acetaminophen   Tablet .. 650 milliGRAM(s) Oral every 6 hours PRN  DULoxetine 60 milliGRAM(s) Oral daily  HYDROmorphone  Injectable 0.5 milliGRAM(s) IV Push every 6 hours PRN  HYDROmorphone  Injectable 1 milliGRAM(s) IV Push every 6 hours PRN  ibuprofen  Tablet. 600 milliGRAM(s) Oral every 8 hours PRN  ondansetron Injectable 4 milliGRAM(s) IV Push every 6 hours PRN      aspirin enteric coated 81 milliGRAM(s) Oral daily  clopidogrel Tablet 75 milliGRAM(s) Oral daily    senna 2 Tablet(s) Oral at bedtime      dextrose 40% Gel 15 Gram(s) Oral once PRN  dextrose 50% Injectable 12.5 Gram(s) IV Push once  dextrose 50% Injectable 25 Gram(s) IV Push once  dextrose 50% Injectable 25 Gram(s) IV Push once  glucagon  Injectable 1 milliGRAM(s) IntraMuscular once PRN  insulin lispro (ADMELOG) corrective regimen sliding scale   SubCutaneous three times a day before meals    dextrose 5%. 1000 milliLiter(s) IV Continuous <Continuous>    darbepoetin Injectable ViaL 300 MICROGram(s) SubCutaneous every 2 weeks  epoetin juan-epbx (RETACRIT) Injectable 68864 Unit(s) IV Push <User Schedule>      sevelamer carbonate 800 milliGRAM(s) Oral three times a day with meals          ICU Vital Signs Last 24 Hrs  T(C): 36.3 (29 Oct 2020 11:00), Max: 36.9 (29 Oct 2020 05:45)  T(F): 97.4 (29 Oct 2020 11:00), Max: 98.5 (29 Oct 2020 05:45)  HR: 65 (29 Oct 2020 12:00) (49 - 112)  BP: 137/48 (29 Oct 2020 12:00) (99/76 - 170/74)  BP(mean): 129 (29 Oct 2020 12:00) (78 - 129)  ABP: --  ABP(mean): --  RR: 18 (29 Oct 2020 12:00) (14 - 32)  SpO2: 100% (29 Oct 2020 12:00) (91% - 100%)      ABG - ( 29 Oct 2020 06:20 )  pH, Arterial: 7.36  pH, Blood: x     /  pCO2: 36    /  pO2: 63    / HCO3: 20    / Base Excess: -4.7  /  SaO2: 91                  I&O's Detail    28 Oct 2020 07:01  -  29 Oct 2020 07:00  --------------------------------------------------------  IN:    Oral Fluid: 1080 mL  Total IN: 1080 mL    OUT:    Voided (mL): 200 mL  Total OUT: 200 mL    Total NET: 880 mL      29 Oct 2020 07:01  -  29 Oct 2020 13:07  --------------------------------------------------------  IN:  Total IN: 0 mL    OUT:    Voided (mL): 200 mL  Total OUT: 200 mL    Total NET: -200 mL          LABS:                        9.0    4.09  )-----------( 183      ( 29 Oct 2020 12:09 )             30.0     10-29    133<L>  |  93<L>  |  62.0<H>  ----------------------------<  158<H>  4.4   |  20.0<L>  |  5.06<H>    Ca    9.0      29 Oct 2020 06:24  Phos  4.2     10-28    TPro  x   /  Alb  3.2<L>  /  TBili  x   /  DBili  x   /  AST  x   /  ALT  x   /  AlkPhos  x   10-28      CARDIAC MARKERS ( 29 Oct 2020 06:24 )  x     / 0.12 ng/mL / x     / x     / x          CAPILLARY BLOOD GLUCOSE      POCT Blood Glucose.: 89 mg/dL (29 Oct 2020 11:59)      Urinalysis Basic - ( 29 Oct 2020 10:04 )    Color: Yellow / Appearance: Clear / S.005 / pH: x  Gluc: x / Ketone: Trace  / Bili: Negative / Urobili: Negative mg/dL   Blood: x / Protein: 100 mg/dL / Nitrite: Negative   Leuk Esterase: Negative / RBC: 0-2 /HPF / WBC 3-5   Sq Epi: x / Non Sq Epi: Negative / Bacteria: Negative      CULTURES:n      Physical Examination:    General: Well appearing, lying in bed in NAD      HEENT: Pupils equal, reactive to light.  Symmetric. No scleral icterus or injection. Right IJ TVP placement, dressing CDI    PULM: Clear to auscultation bilaterally, no wheezes, rales or rhonchi, no significant sputum production or increased respiratory effort    NECK: Supple, no lymphadenopathy, trachea midline    CVS: paced @ 60 w/ intermittent intrinsic beats to 70s-80s NSR, no murmurs, +s1/s2    ABD: Soft, nondistended, nontender, normoactive bowel sounds    EXT: RLE erythematous, edematous, wrapped in ace bandage, tender to palpation, left knee ecchymosis no swelling. Pulses heard on doppler b/l LE    SKIN: Warm and well perfused    NEURO: Alert, oriented, interactive, nonfocal    DEVICES: Loop recorder  LINES: Rt ij TVP  ALBERT: N    RADIOLOGY: < from: Xray Chest 1 View- PORTABLE-Urgent (Xray Chest 1 View- PORTABLE-Urgent .) (10.29.20 @ 06:06) >  Single frontal view of the chest demonstrates mild CHF. Transvenous pacer lead is noted. Status post transcatheter aortic valve placement. Left-sided loop recorder. The cardiomediastinal silhouette is enlarged. No acute osseous abnormalities. Overlying EKG leads and wires are noted    IMPRESSION: Mild CHF.    < end of copied text >      < from: Xray Ankle Complete 3 Views, Right (10.28.20 @ 15:40) >  Impression:  Vascular calcifications. No osseous abnormality..    < end of copied text >

## 2020-10-29 NOTE — CHART NOTE - NSCHARTNOTEFT_GEN_A_CORE
RN called at 3:55 am to state that pt had repeated pauses on tele monitoring, and that he had synopsized while out of bed to use urinal.  Pt immediately evaluated bedside, chief resident called to bedside as well. RN was bedside with pt, pt stating he felt light headed and could not recall events of fall, but denies hitting head.  Tele showed 3 pauses of 9 to 12 seconds. Initial vitals HR 63, 154/63, sating 94% on RA. Pt complained of left knee pain but no pain anywhere else. At this time a rapid was called, and rapid team was bedside.  O2 NC, , and defibrillator pads placed on pt. Monitoring showed persistent pauses on cardiac monitor, and pt was still endorsing light headedness.   Pt scheduled for pacemaker placement tomorrow. MICU team called for stat consult, per EP's recs.  MICU PA was bedside to evaluate patient at this time, assessment ongoing.

## 2020-10-29 NOTE — CONSULT NOTE ADULT - SUBJECTIVE AND OBJECTIVE BOX
Patient is a 86y old  Male who presents with a chief complaint of per hpi (28 Oct 2020 13:42)      BRIEF HOSPITAL COURSE:  87 y/o male pmhx CAD, HTN, HLD, HFpEF, carotid disease s/p R CEA, stage 5 CKD w/ LUE fistula, recently on flecainide, s/p Loop recorder placement 6/10/20, AS s/p TAVR, PAD admitted on 10/21 w/ right lower extremity pain now  s/p RLE angioplasty w/ 3 stents placed right SFA, popliteal, and peroneal arteries. Hospital course complicated by having CHB w/ 4second pauses associated w/ near syncope. EP was following w/ plan for PPM on friday.       Events last 24 hours: Tonight RRT was called after patient was noted to have 9-12 second pause x3 on telemetry w/ associated syncope. During the first pause, he was sitting on edge of the bed using urinal, where he fell and hit his knee, which he does not recall. BP was stable during the rapid. He was transferred to ICU for emergent TVP placement. While placing cordis and TVP, he was being transcutaneously paced. During the procedure had 2 more long pauses w/ associated syncope. TVP was placed w/ successful capture and no further pauses. Throughout patient was complaining of 10/10 right foot pain.      PAST MEDICAL & SURGICAL HISTORY:  GI bleeding  due to ulcerated polyps and colonic AVMs    Aortic stenosis  - s/p valve replacement    ESRD on dialysis  HD on Mondays and Fridays    Risk factors for obstructive sleep apnea    Anemia    RICHARDS (dyspnea on exertion)    VT (ventricular tachycardia)    HTN (hypertension)    CAD (coronary artery disease)    Arrhythmia    AV block, 1st degree    DM (diabetes mellitus)  - resolved    S/P TAVR (transcatheter aortic valve replacement)    H/O carotid endarterectomy  Right    A-V fistula  left arm 5/2017    H/O angioplasty  2013,  no  intervention    H/O left knee surgery    H/O circumcision  at  age  65        Review of Systems:  CONSTITUTIONAL: No fever, chills, or fatigue  EYES: No eye pain, visual disturbances, or discharge  ENMT:  No difficulty hearing, tinnitus, vertigo; No sinus or throat pain  NECK: No pain or stiffness  RESPIRATORY: No cough, wheezing, chills or hemoptysis; No shortness of breath  CARDIOVASCULAR: No chest pain, palpitations, dizziness, or leg swelling  GASTROINTESTINAL: No abdominal or epigastric pain. No nausea, vomiting, or hematemesis; No diarrhea or constipation. No melena or hematochezia.  GENITOURINARY: No dysuria, frequency, hematuria, or incontinence  NEUROLOGICAL: No headaches, memory loss, loss of strength, numbness, or tremors  SKIN: No itching, burning, rashes, or lesions   MUSCULOSKELETAL:  + Right foot pain   PSYCHIATRIC: No depression, anxiety, mood swings, or difficulty sleeping      Medications:    cloNIDine 0.1 milliGRAM(s) Oral at bedtime  doxazosin 1 milliGRAM(s) Oral at bedtime  hydrALAZINE 50 milliGRAM(s) Oral two times a day  isosorbide   mononitrate ER Tablet (IMDUR) 30 milliGRAM(s) Oral daily  NIFEdipine XL 60 milliGRAM(s) Oral at bedtime  NIFEdipine XL 90 milliGRAM(s) Oral daily      acetaminophen   Tablet .. 650 milliGRAM(s) Oral every 6 hours PRN  DULoxetine 60 milliGRAM(s) Oral daily  ibuprofen  Tablet. 600 milliGRAM(s) Oral every 8 hours PRN  ondansetron Injectable 4 milliGRAM(s) IV Push every 6 hours PRN      aspirin enteric coated 81 milliGRAM(s) Oral daily  clopidogrel Tablet 75 milliGRAM(s) Oral daily    senna 2 Tablet(s) Oral at bedtime      dextrose 40% Gel 15 Gram(s) Oral once PRN  dextrose 50% Injectable 12.5 Gram(s) IV Push once  dextrose 50% Injectable 25 Gram(s) IV Push once  dextrose 50% Injectable 25 Gram(s) IV Push once  glucagon  Injectable 1 milliGRAM(s) IntraMuscular once PRN  insulin lispro (ADMELOG) corrective regimen sliding scale   SubCutaneous three times a day before meals    dextrose 10%. 1000 milliLiter(s) IV Continuous <Continuous>  dextrose 5%. 1000 milliLiter(s) IV Continuous <Continuous>    darbepoetin Injectable ViaL 300 MICROGram(s) SubCutaneous every 2 weeks  epoetin juan-epbx (RETACRIT) Injectable 23345 Unit(s) IV Push <User Schedule>      sevelamer carbonate 800 milliGRAM(s) Oral three times a day with meals          ICU Vital Signs Last 24 Hrs  T(C): 36.4 (28 Oct 2020 20:23), Max: 36.7 (28 Oct 2020 10:46)  T(F): 97.6 (28 Oct 2020 20:23), Max: 98.1 (28 Oct 2020 10:46)  HR: 104 (29 Oct 2020 06:15) (49 - 112)  BP: 155/75 (29 Oct 2020 06:15) (99/76 - 182/75)  BP(mean): 99 (29 Oct 2020 06:15) (86 - 121)  ABP: --  ABP(mean): --  RR: 16 (29 Oct 2020 06:15) (14 - 32)  SpO2: 91% (29 Oct 2020 06:15) (91% - 98%)      ABG - ( 29 Oct 2020 06:20 )  pH, Arterial: 7.36  pH, Blood: x     /  pCO2: 36    /  pO2: 63    / HCO3: 20    / Base Excess: -4.7  /  SaO2: 91                  I&O's Detail    27 Oct 2020 07:01  -  28 Oct 2020 07:00  --------------------------------------------------------  IN:  Total IN: 0 mL    OUT:    Voided (mL): 600 mL  Total OUT: 600 mL    Total NET: -600 mL      28 Oct 2020 07:01  -  29 Oct 2020 06:25  --------------------------------------------------------  IN:    Oral Fluid: 1080 mL  Total IN: 1080 mL    OUT:    Voided (mL): 200 mL  Total OUT: 200 mL    Total NET: 880 mL            LABS:                        9.5    4.38  )-----------( 162      ( 28 Oct 2020 09:23 )             32.2     10-28    134<L>  |  95<L>  |  55.0<H>  ----------------------------<  93  4.8   |  22.0  |  4.98<H>    Ca    9.0      28 Oct 2020 09:23  Phos  4.2     10-28    TPro  x   /  Alb  3.2<L>  /  TBili  x   /  DBili  x   /  AST  x   /  ALT  x   /  AlkPhos  x   10-28          CAPILLARY BLOOD GLUCOSE      POCT Blood Glucose.: 133 mg/dL (29 Oct 2020 04:08)        CULTURES:      Physical Examination:    General: Mild distress from pain.  Alert, oriented, interactive, nonfocal    HEENT: Pupils equal, reactive to light.  Symmetric.    PULM: Clear to auscultation bilaterally, no significant sputum production    CVS: Regular rate and rhythm, no murmurs, rubs, or gallops    ABD: Soft, nondistended, nontender, normoactive bowel sounds, no masses    EXT: No edema, nontender    SKIN: Warm and well perfused, no rashes noted.    NEURO: A&Ox3, strength 5/5 all extremities, cranial nerves grossly intact, no focal deficits    RADIOLOGY: ***    CRITICAL CARE TIME SPENT: ***  Evaluating/treating patient, reviewing data/labs/imaging, discussing case with multidisciplinary team, discussing plan/goals of care with patient/family. Non-inclusive of procedure time.   Patient is a 86y old  Male who presents with a chief complaint of per hpi (28 Oct 2020 13:42)      BRIEF HOSPITAL COURSE:  87 y/o male pmhx CAD, HTN, HLD, HFpEF, carotid disease s/p R CEA, stage 5 CKD w/ LUE fistula, recently on flecainide, s/p Loop recorder placement 6/10/20, AS s/p TAVR, PAD admitted on 10/21 w/ right lower extremity pain now  s/p RLE angioplasty w/ 3 stents placed right SFA, popliteal, and peroneal arteries. Hospital course complicated by having CHB w/ 4second pauses associated w/ near syncope. EP was following w/ plan for PPM on friday.       Events last 24 hours: Tonight RRT was called after patient was noted to have 9-12 second pause x3 on telemetry w/ associated syncope. During the first pause, he was sitting on edge of the bed using urinal, where he fell and hit his knee, which he does not recall. BP was stable during the rapid. He was transferred to ICU for emergent TVP placement. While placing cordis and TVP, he was being transcutaneously paced. During the procedure had 2 more long pauses w/ associated syncope. TVP was placed w/ successful capture and no further pauses. Throughout patient was complaining of 10/10 right foot pain.      PAST MEDICAL & SURGICAL HISTORY:  GI bleeding  due to ulcerated polyps and colonic AVMs    Aortic stenosis  - s/p valve replacement    ESRD on dialysis  HD on Mondays and Fridays    Risk factors for obstructive sleep apnea    Anemia    RICHARDS (dyspnea on exertion)    VT (ventricular tachycardia)    HTN (hypertension)    CAD (coronary artery disease)    Arrhythmia    AV block, 1st degree    DM (diabetes mellitus)  - resolved    S/P TAVR (transcatheter aortic valve replacement)    H/O carotid endarterectomy  Right    A-V fistula  left arm 5/2017    H/O angioplasty  2013,  no  intervention    H/O left knee surgery    H/O circumcision  at  age  65        Review of Systems:  CONSTITUTIONAL: No fever, chills, or fatigue  EYES: No eye pain, visual disturbances, or discharge  ENMT:  No difficulty hearing, tinnitus, vertigo; No sinus or throat pain  NECK: No pain or stiffness  RESPIRATORY: No cough, wheezing, chills or hemoptysis; No shortness of breath  CARDIOVASCULAR: No chest pain, palpitations, dizziness, or leg swelling  GASTROINTESTINAL: No abdominal or epigastric pain. No nausea, vomiting, or hematemesis; No diarrhea or constipation. No melena or hematochezia.  GENITOURINARY: No dysuria, frequency, hematuria, or incontinence  NEUROLOGICAL: No headaches, memory loss, loss of strength, numbness, or tremors  SKIN: No itching, burning, rashes, or lesions   MUSCULOSKELETAL:  + Right foot pain   PSYCHIATRIC: No depression, anxiety, mood swings, or difficulty sleeping      Medications:    cloNIDine 0.1 milliGRAM(s) Oral at bedtime  doxazosin 1 milliGRAM(s) Oral at bedtime  hydrALAZINE 50 milliGRAM(s) Oral two times a day  isosorbide   mononitrate ER Tablet (IMDUR) 30 milliGRAM(s) Oral daily  NIFEdipine XL 60 milliGRAM(s) Oral at bedtime  NIFEdipine XL 90 milliGRAM(s) Oral daily      acetaminophen   Tablet .. 650 milliGRAM(s) Oral every 6 hours PRN  DULoxetine 60 milliGRAM(s) Oral daily  ibuprofen  Tablet. 600 milliGRAM(s) Oral every 8 hours PRN  ondansetron Injectable 4 milliGRAM(s) IV Push every 6 hours PRN      aspirin enteric coated 81 milliGRAM(s) Oral daily  clopidogrel Tablet 75 milliGRAM(s) Oral daily    senna 2 Tablet(s) Oral at bedtime      dextrose 40% Gel 15 Gram(s) Oral once PRN  dextrose 50% Injectable 12.5 Gram(s) IV Push once  dextrose 50% Injectable 25 Gram(s) IV Push once  dextrose 50% Injectable 25 Gram(s) IV Push once  glucagon  Injectable 1 milliGRAM(s) IntraMuscular once PRN  insulin lispro (ADMELOG) corrective regimen sliding scale   SubCutaneous three times a day before meals    dextrose 10%. 1000 milliLiter(s) IV Continuous <Continuous>  dextrose 5%. 1000 milliLiter(s) IV Continuous <Continuous>    darbepoetin Injectable ViaL 300 MICROGram(s) SubCutaneous every 2 weeks  epoetin juan-epbx (RETACRIT) Injectable 92464 Unit(s) IV Push <User Schedule>      sevelamer carbonate 800 milliGRAM(s) Oral three times a day with meals          ICU Vital Signs Last 24 Hrs  T(C): 36.4 (28 Oct 2020 20:23), Max: 36.7 (28 Oct 2020 10:46)  T(F): 97.6 (28 Oct 2020 20:23), Max: 98.1 (28 Oct 2020 10:46)  HR: 104 (29 Oct 2020 06:15) (49 - 112)  BP: 155/75 (29 Oct 2020 06:15) (99/76 - 182/75)  BP(mean): 99 (29 Oct 2020 06:15) (86 - 121)  ABP: --  ABP(mean): --  RR: 16 (29 Oct 2020 06:15) (14 - 32)  SpO2: 91% (29 Oct 2020 06:15) (91% - 98%)      ABG - ( 29 Oct 2020 06:20 )  pH, Arterial: 7.36  pH, Blood: x     /  pCO2: 36    /  pO2: 63    / HCO3: 20    / Base Excess: -4.7  /  SaO2: 91                  I&O's Detail    27 Oct 2020 07:01  -  28 Oct 2020 07:00  --------------------------------------------------------  IN:  Total IN: 0 mL    OUT:    Voided (mL): 600 mL  Total OUT: 600 mL    Total NET: -600 mL      28 Oct 2020 07:01  -  29 Oct 2020 06:25  --------------------------------------------------------  IN:    Oral Fluid: 1080 mL  Total IN: 1080 mL    OUT:    Voided (mL): 200 mL  Total OUT: 200 mL    Total NET: 880 mL            LABS:                        9.5    4.38  )-----------( 162      ( 28 Oct 2020 09:23 )             32.2     10-28    134<L>  |  95<L>  |  55.0<H>  ----------------------------<  93  4.8   |  22.0  |  4.98<H>    Ca    9.0      28 Oct 2020 09:23  Phos  4.2     10-28    TPro  x   /  Alb  3.2<L>  /  TBili  x   /  DBili  x   /  AST  x   /  ALT  x   /  AlkPhos  x   10-28          CAPILLARY BLOOD GLUCOSE      POCT Blood Glucose.: 133 mg/dL (29 Oct 2020 04:08)        CULTURES:      Physical Examination:    General: Mild distress from pain.  Alert, oriented, interactive, nonfocal    HEENT: Pupils equal, reactive to light.  Symmetric.    PULM: Clear to auscultation bilaterally, no significant sputum production    CVS: Regular rate and rhythm, no murmurs, rubs, or gallops    ABD: Soft, nondistended, nontender, normoactive bowel sounds, no masses    EXT: RLE tender. Dopplerable pulses throughout on b/l lower extremites     SKIN: Warm and well perfused, no rashes noted.    NEURO: A&Ox3, moving all extremities spontaneously Following commands     RADIOLOGY:   CRITICAL CARE TIME SPENT:  55 min  Evaluating/treating patient, reviewing data/labs/imaging, discussing case with multidisciplinary team, discussing plan/goals of care with patient/family. Non-inclusive of procedure time.

## 2020-10-29 NOTE — PROGRESS NOTE ADULT - ASSESSMENT
86M w/ CLI s/p RLE angiogram with angioplasty June 2020 initially presenting w/persistent RLE rest pain, now s/p RLE angio/SFA angioplasty/peroneal angioplasty and pop stents x 3 on 10/22  RLE foot pain improved and now with R ankle pain   -Eliquis on hold for PPM today and ok to cont Plavix  -care per ICU  -Per daughter, due for Aranesp and confirmed with Aliyah GARCIA at Dr Varghese office and should receive today

## 2020-10-29 NOTE — PROGRESS NOTE ADULT - SUBJECTIVE AND OBJECTIVE BOX
Subjective:  Pt was transferred to telemetry. Overnight he had recurrent episodes of transient AV block and did have syncope with this. fortunately he sustained no major trauma.   He was transferred to the ICU and a TVP was placed.  He is currently comfortable, HD stable, and in no distress.     review of telemetry reveals PVC-induced transient AV block with a period of ventricular asystole >8 seconds.     MEDICATIONS  (STANDING):  aspirin enteric coated 81 milliGRAM(s) Oral daily  cloNIDine 0.1 milliGRAM(s) Oral at bedtime  clopidogrel Tablet 75 milliGRAM(s) Oral daily  darbepoetin Injectable ViaL 300 MICROGram(s) SubCutaneous every 2 weeks  dextrose 10%. 1000 milliLiter(s) (30 mL/Hr) IV Continuous <Continuous>  dextrose 5%. 1000 milliLiter(s) (50 mL/Hr) IV Continuous <Continuous>  dextrose 50% Injectable 12.5 Gram(s) IV Push once  dextrose 50% Injectable 25 Gram(s) IV Push once  dextrose 50% Injectable 25 Gram(s) IV Push once  doxazosin 1 milliGRAM(s) Oral at bedtime  DULoxetine 60 milliGRAM(s) Oral daily  epoetin juan-epbx (RETACRIT) Injectable 22100 Unit(s) IV Push <User Schedule>  hydrALAZINE 50 milliGRAM(s) Oral two times a day  insulin lispro (ADMELOG) corrective regimen sliding scale   SubCutaneous three times a day before meals  isosorbide   mononitrate ER Tablet (IMDUR) 30 milliGRAM(s) Oral daily  NIFEdipine XL 60 milliGRAM(s) Oral at bedtime  NIFEdipine XL 90 milliGRAM(s) Oral daily  senna 2 Tablet(s) Oral at bedtime  sevelamer carbonate 800 milliGRAM(s) Oral three times a day with meals    MEDICATIONS  (PRN):  acetaminophen   Tablet .. 650 milliGRAM(s) Oral every 6 hours PRN Mild Pain (1 - 3)  dextrose 40% Gel 15 Gram(s) Oral once PRN Blood Glucose LESS THAN 70 milliGRAM(s)/deciliter  glucagon  Injectable 1 milliGRAM(s) IntraMuscular once PRN Glucose LESS THAN 70 milligrams/deciliter  ibuprofen  Tablet. 600 milliGRAM(s) Oral every 8 hours PRN Moderate Pain (4 - 6)  ondansetron Injectable 4 milliGRAM(s) IV Push every 6 hours PRN Nausea      Allergies    Plavix (Hives)  Toprol-XL (Rash)    Intolerances        Vital Signs Last 24 Hrs  T(C): 36.3 (29 Oct 2020 08:00), Max: 36.9 (29 Oct 2020 05:45)  T(F): 97.3 (29 Oct 2020 08:00), Max: 98.5 (29 Oct 2020 05:45)  HR: 80 (29 Oct 2020 09:00) (49 - 112)  BP: 131/55 (29 Oct 2020 09:00) (99/76 - 170/74)  BP(mean): 79 (29 Oct 2020 09:00) (78 - 121)  RR: 18 (29 Oct 2020 09:00) (14 - 32)  SpO2: 100% (29 Oct 2020 09:00) (91% - 100%)    Physical Exam:  Constitutional: NAD, AAOx3  Cardiovascular: +S1S2 RRR.  TVP in right IJ.   Pulmonary: CTA b/l, unlabored  GI: soft NTND +BS  Extremities: LE erythematous in bandage s/p recent vascular surgery  Neuro: non focal, MARQUEZ x4    LABS:                        8.9    4.89  )-----------( 159      ( 29 Oct 2020 06:24 )             29.7     10-29    133<L>  |  93<L>  |  62.0<H>  ----------------------------<  158<H>  4.4   |  20.0<L>  |  5.06<H>    Ca    9.0      29 Oct 2020 06:24  Phos  4.2     10-28    TPro  x   /  Alb  3.2<L>  /  TBili  x   /  DBili  x   /  AST  x   /  ALT  x   /  AlkPhos  x   10-28          RADIOLOGY & ADDITIONAL TESTS:  < from: TTE Echo Limited or F/U (09.17.20 @ 21:31) >  Summary:   1. Patient tachycardic during the entire study.   2. Mildly enlarged left atrium.   3. There is mild concentricleft ventricular hypertrophy.   4. Hyperdyanmic wall motion. Left ventricular ejection fraction, by visual estimation, is 60 to 65%.   5. The right atrium is normal in size.   6. Normal right ventricular size and function.   7. Bioprosthetic aortic valve visualized. Likely Padilla JENN. well seated valve. Acceptable gradients. No regurgitation.   8. There is no evidence of pericardial effusion.    MD Rohith, RPVI Electronically signed on 9/18/2020 at 10:11:03 AM    < end of copied text >  < from: Cardiac Cath Lab - Adult (08.17.20 @ 12:22) >  CORONARY VESSELS: The coronary circulation is co-dominant.  LM:   --  Ostial LM: There was a 30 % stenosis.  LAD:   --  Mid LAD: There was a 50 % stenosis.  --  Distal LAD: There was a 60 % stenosis.  CX:   --  Proximal circumflex: There was a 20 % stenosis.  RCA:   --  Mid RCA: There was a 70 % stenosis. Superior takeoff.  COMPLICATIONS: No complications occurred during the cath lab visit.  DIAGNOSTIC IMPRESSIONS: Severe RCA disease but co-dominant vessel, does not  supply large territory.  Moderate LAD disease- similar to angiogram from 2017.  Peak to peak gardient of 55-60 mmHg- consistent with severe AS.  LVEDP 14 mmHg.  DIAGNOSTIC RECOMMENDATIONS: Medical management of CAD.  TAVR evaluation. (Plavix allergy- may have to consider using effient as  DAPT post TAVR)    < end of copied text >

## 2020-10-29 NOTE — PROVIDER CONTACT NOTE (EICU) - RECOMMENDATIONS
TVP placed in ICU with externally paced initially  Monitor electrolytes  Cardiology to follow up regarding PPM   Discussed with ICU PA

## 2020-10-29 NOTE — PROGRESS NOTE ADULT - SUBJECTIVE AND OBJECTIVE BOX
Events of last PM noted. Pt with episodes of CHB/syncope and RR called. Pt transferred to ICU for external pacing. EP involved and pt had TVP placed. Plan for leadless PPM today. Pt conts to c/o R ankle pain and int rest pain to R foot. Denies any CP, SOB, N/V.     MEDICATIONS  (STANDING):  aspirin enteric coated 81 milliGRAM(s) Oral daily  cloNIDine 0.1 milliGRAM(s) Oral at bedtime  clopidogrel Tablet 75 milliGRAM(s) Oral daily  darbepoetin Injectable ViaL 300 MICROGram(s) SubCutaneous every 2 weeks  dextrose 5%. 1000 milliLiter(s) (50 mL/Hr) IV Continuous <Continuous>  dextrose 50% Injectable 12.5 Gram(s) IV Push once  dextrose 50% Injectable 25 Gram(s) IV Push once  dextrose 50% Injectable 25 Gram(s) IV Push once  doxazosin 1 milliGRAM(s) Oral at bedtime  DULoxetine 60 milliGRAM(s) Oral daily  epoetin juan-epbx (RETACRIT) Injectable 93257 Unit(s) IV Push <User Schedule>  hydrALAZINE 50 milliGRAM(s) Oral two times a day  insulin lispro (ADMELOG) corrective regimen sliding scale   SubCutaneous three times a day before meals  isosorbide   mononitrate ER Tablet (IMDUR) 30 milliGRAM(s) Oral daily  NIFEdipine XL 90 milliGRAM(s) Oral daily  NIFEdipine XL 60 milliGRAM(s) Oral at bedtime  senna 2 Tablet(s) Oral at bedtime  sevelamer carbonate 800 milliGRAM(s) Oral three times a day with meals    MEDICATIONS  (PRN):  acetaminophen   Tablet .. 650 milliGRAM(s) Oral every 6 hours PRN Mild Pain (1 - 3)  dextrose 40% Gel 15 Gram(s) Oral once PRN Blood Glucose LESS THAN 70 milliGRAM(s)/deciliter  glucagon  Injectable 1 milliGRAM(s) IntraMuscular once PRN Glucose LESS THAN 70 milligrams/deciliter  ibuprofen  Tablet. 600 milliGRAM(s) Oral every 8 hours PRN Moderate Pain (4 - 6)  ondansetron Injectable 4 milliGRAM(s) IV Push every 6 hours PRN Nausea    Vital Signs Last 24 Hrs  T(C): 36.3 (29 Oct 2020 08:00), Max: 36.9 (29 Oct 2020 05:45)  T(F): 97.3 (29 Oct 2020 08:00), Max: 98.5 (29 Oct 2020 05:45)  HR: 72 (29 Oct 2020 10:00) (49 - 112)  BP: 144/55 (29 Oct 2020 10:00) (99/76 - 170/74)  BP(mean): 82 (29 Oct 2020 10:00) (78 - 121)  RR: 15 (29 Oct 2020 10:00) (14 - 32)  SpO2: 100% (29 Oct 2020 10:00) (91% - 100%)    Physical exam:   General: A&Ox3, NAD. Resting comfortably in chair  Abdomen: Soft, NTND  Ext: LUE w pulsatile thrill of AVF w ecchymosis and improved edema  mono/bi signal of RLE AT, dependent rubor of RLE w no tissue loss                           8.9    4.89  )-----------( 159      ( 29 Oct 2020 06:24 )             29.7   10-29    133<L>  |  93<L>  |  62.0<H>  ----------------------------<  158<H>  4.4   |  20.0<L>  |  5.06<H>    Ca    9.0      29 Oct 2020 06:24  Phos  4.2     10-28    TPro  x   /  Alb  3.2<L>  /  TBili  x   /  DBili  x   /  AST  x   /  ALT  x   /  AlkPhos  x   10-28    CAPILLARY BLOOD GLUCOSE  POCT Blood Glucose.: 149 mg/dL (29 Oct 2020 06:28)  POCT Blood Glucose.: 133 mg/dL (29 Oct 2020 04:08)  POCT Blood Glucose.: 154 mg/dL (28 Oct 2020 21:27)  POCT Blood Glucose.: 147 mg/dL (28 Oct 2020 17:03)  POCT Blood Glucose.: 208 mg/dL (28 Oct 2020 12:06)

## 2020-10-29 NOTE — PROGRESS NOTE ADULT - SUBJECTIVE AND OBJECTIVE BOX
PROCEDURE(S): [employer] Micra Leadless Pacemaker Implant    ELECTRPHYSIOLOGIST(S): Santi Palma MD    COMPLICATIONS:  none          DISPOSITION:  Observation Unit           CONDITION: Stable      Pt doing well s/p MDT Micra PPM implant. Ongoing (baseline) moderate pain to RLE; otherwise denies complaint.      Exam:   T(C): 36.6 (10-29-20 @ 17:15), Max: 36.9 (10-29-20 @ 05:45)  HR: 93 (10-29-20 @ 18:52) (49 - 112)  BP: 177/95 (10-29-20 @ 18:52) (99/76 - 185/78)  RR: 18 (10-29-20 @ 18:52) (14 - 32)  SpO2: 100% (10-29-20 @ 18:52) (91% - 100%)    VSS, NAD, sleepy but A&O x 3  Groin: Hemostatic suture in place; site C/D/I; no bleeding, hematoma, erythema or edema  Card: S1/S2, RRR, no m/g/r  Resp: lungs CTA b/l  Abd: S/NT/ND  Ext: no edema, distal pulses intact    Assessment:   85 yo M h/o anamolous right coronary artery, CAD, hypertension, hyperlipidemia, HFpEF, carotid disease s/p remote CEA, stage 5 CKD with LUE fistula, frequent PVCs previously on flecainide, s/p ILR implant 6/10/20 for longitudinal rhythm monitoring, AS s/p TAVR 8/21/20, PAD s/p RLE angioplasty June 2020, admitted with RLE pain s/p balloon angioplasty to right SFA, popliteal, and peroneal arteries with popliteal stents x3. Noted on ILR transmission to have episode of CHB > 4 seconds with a single, wide complex escape beat, which correlated with an episode of near syncope. Now with recurrent episodes of PVC-induced paroxysmal AV block resulting in prolonged asystole and recurrent syncope requiring TVP placement via RIJ access. Now status post uncomplicated MDT Micra Leadless pacemaker implant (VDD at 50bpm).     Plan:   Bedrest x 6 hours post implant, then OOB w/ assist & progress as tolerated.    Continued observation on telemetry overnight.   Vancomycin 1 gm given intra-op. Pt is not due for dialysis until Monday - will not redose abx.     Pain control with IV & PO analgesia PRN.   NO HEPARIN OR LOVENOX, INCLUDING PROPHYLACTIC/SUBCUT DOSING, UNTIL OTHERWISE ADVISED BY EP.   Will maintain RIJ for now given tenuous function of peripheral IVs - remove in AM if not in use.   Resume prior medications. May restart Eliquis tomorrow AM.   PA/Lat CXR and device check in AM.   Pending status overnight, possible d/c home tomorrow with outpt f/up in 1-2 weeks.  Further dispo per primary team.

## 2020-10-29 NOTE — PROGRESS NOTE ADULT - ASSESSMENT
Pt is an 87 y/o male with PMHx of HTN, HLD, CAD, HFpEF, s/p Right CEA for Carotid artery disease, CKD5 on HD 2d/week via LUE fistula, s/p flecainide w/ ILR placed 6/2020, AS s/p TAVR, PAD here with:    Assessment:  1. CHB w/ pauses up to 9-12 seconds- s/p TVP  2. Syncope  3. S/p RLE angioplasty w/ 3x stents placed right SFA, popliteal, and peroneal arteries  4. CKD5 on HD      Plan:  - Planned for PPM w/ EP this afternoon  - Continue TV pacing until then, lowered rate to 60bpm, VVI, 10ma. Tolerating well with good capture and intermitting intrinsic pacing.   - HD prior to PPM, receiving now. Will get labs post HD. Nephro following  - Aranesp ordered, s/p epo yesterday  - Holding AV nodals  - Holding eliquis for procedure this evening, on plavix  - Stat left knee xray  - Will get CT head given on eliquis prior and question of whether pt his head given he does not recall some of the events that transpired  - PRN pain control for RLE, will try small dose of dilaudid for now given renal function and NPO status  - Vascular following, appreciate recommendations  - Cont neurovascular checks    Dispo: Full code. To remain in ICU

## 2020-10-29 NOTE — PROGRESS NOTE ADULT - SUBJECTIVE AND OBJECTIVE BOX
85 y/o male s/p leadless PPM today for heart block and an episode of syncopy early am. pt. was admitted under vascular surgery had angioplasty with 3 stents before heart block with multiple pauses episode occured. Pt. was then was transferred to MICU and now post procedure pt. is transferred to hospitalist team. pt. seen lying in bed , no cp, no sob, reports on and off rt. lower ext. burning type pain ( the side where he had angioplasty ) which is chronic, pt's daughter at bedside.   Hospital course :  Pt is an 85 y/o male with PMHx of HTN, HLD, CAD, HFpEF, s/p Right CEA for Carotid artery disease, CKD5 on HD 2d/week via LUE fistula, s/p flecainide w/ ILR placed 6/2020, AS s/p TAVR, PAD initially presented 10/21 , s/p RLE s/p angioplasty with 3 stents placed to the rt SFA, peroneal and popliteal artery. Early am 10 /29/20 pt went into CHB w/ multiple pauses up to 9-12 seconds, s/p RRT, ultimately requiring TVP placement in ICU by ICU PA. 87 y/o male s/p leadless PPM today for heart block and an episode of syncopy early am. pt. was admitted under vascular surgery had angioplasty with 3 stents before heart block with multiple pauses episode occured. Pt. was then was transferred to MICU and now post procedure pt. is transferred to hospitalist team. pt. seen lying in bed , no cp, no sob, reports on and off rt. lower ext. burning type pain ( the side where he had angioplasty ) which is chronic, pt's daughter at bedside.   Hospital course :  Pt is an 87 y/o male with PMHx of HTN, HLD, CAD, HFpEF, s/p Right CEA for Carotid artery disease, CKD5 on HD 2d/week via LUE fistula, s/p flecainide w/ ILR placed 6/2020, AS s/p TAVR, PAD initially presented 10/21 , s/p RLE s/p angioplasty with 3 stents placed to the rt SFA, peroneal and popliteal artery. Early am 10 /29/20 pt went into CHB w/ multiple pauses up to 9-12 seconds, s/p RRT, ultimately requiring TVP placement in ICU by ICU PA.     P/E   Vital Signs Last 24 Hrs  T(C): 36.6 (29 Oct 2020 17:15), Max: 36.9 (29 Oct 2020 05:45)  T(F): 97.8 (29 Oct 2020 17:15), Max: 98.5 (29 Oct 2020 05:45)  HR: 93 (29 Oct 2020 18:52) (49 - 112)  BP: 177/95 (29 Oct 2020 18:52) (99/76 - 185/78)  BP(mean): 87 (29 Oct 2020 15:05) (78 - 129)  RR: 18 (29 Oct 2020 18:52) (14 - 32)  SpO2: 100% (29 Oct 2020 18:52) (91% - 100%)  General: An elderly male lying in bed not in distress.   HEENT: AT, NC. PERRL. intact EOM. no throat erythema or exudate.   Neck: supple. no JVD.   Chest: CTA bilaterally  Heart: S1,S2. RRR. no murmur , no edema.  Abdomen: soft. non-tender. non-distended. + BS.   Ext: no calf tenderness on either side, pt. moves toes on both sides without sig. pain, reports burning pain inside RLE, chronic. sensory intact.  Neuro: AAO x3. no focal weakness. no speech disorder. CNs intact.  Skin: rt. groin area with no noteable swelling or hematoma. no diaphoresis.   psych : no agitation, no si/hi.     MEDICATIONS  (STANDING):  aspirin enteric coated 81 milliGRAM(s) Oral daily  cloNIDine 0.1 milliGRAM(s) Oral at bedtime  clopidogrel Tablet 75 milliGRAM(s) Oral daily  darbepoetin Injectable ViaL 300 MICROGram(s) SubCutaneous every 2 weeks  dextrose 10%. 1000 milliLiter(s) (30 mL/Hr) IV Continuous <Continuous>  dextrose 5%. 1000 milliLiter(s) (50 mL/Hr) IV Continuous <Continuous>  dextrose 50% Injectable 12.5 Gram(s) IV Push once  dextrose 50% Injectable 25 Gram(s) IV Push once  dextrose 50% Injectable 25 Gram(s) IV Push once  doxazosin 1 milliGRAM(s) Oral at bedtime  DULoxetine 60 milliGRAM(s) Oral daily  epoetin juan-epbx (RETACRIT) Injectable 87745 Unit(s) IV Push <User Schedule>  hydrALAZINE 50 milliGRAM(s) Oral two times a day  insulin lispro (ADMELOG) corrective regimen sliding scale   SubCutaneous three times a day before meals  isosorbide   mononitrate ER Tablet (IMDUR) 30 milliGRAM(s) Oral daily  NIFEdipine XL 60 milliGRAM(s) Oral at bedtime  NIFEdipine XL 90 milliGRAM(s) Oral daily  senna 2 Tablet(s) Oral at bedtime  sevelamer carbonate 800 milliGRAM(s) Oral three times a day with meals    MEDICATIONS  (PRN):  acetaminophen   Tablet .. 650 milliGRAM(s) Oral every 6 hours PRN Mild Pain (1 - 3)  dextrose 40% Gel 15 Gram(s) Oral once PRN Blood Glucose LESS THAN 70 milliGRAM(s)/deciliter  glucagon  Injectable 1 milliGRAM(s) IntraMuscular once PRN Glucose LESS THAN 70 milligrams/deciliter  hydrALAZINE Injectable 10 milliGRAM(s) IV Push every 6 hours PRN SBP >180  HYDROmorphone  Injectable 0.5 milliGRAM(s) IV Push every 6 hours PRN Moderate Pain (4 - 6)  HYDROmorphone  Injectable 1 milliGRAM(s) IV Push every 6 hours PRN Severe Pain (7 - 10)  HYDROmorphone  Injectable 0.5 milliGRAM(s) IV Push every 15 minutes PRN Breakthrough pain  ibuprofen  Tablet. 600 milliGRAM(s) Oral every 8 hours PRN Moderate Pain (4 - 6)  ondansetron Injectable 4 milliGRAM(s) IV Push every 6 hours PRN Nausea   85 y/o male s/p leadless PPM today for heart block and an episode of syncopy early am. pt. was admitted under vascular surgery had angioplasty with 3 stents before heart block with multiple pauses episode occured. Pt. was then was transferred to MICU and now post procedure pt. is transferred to hospitalist team. pt. seen lying in bed , no cp, no sob, reports on and off rt. lower ext. burning type pain ( the side where he had angioplasty ) which is chronic, pt's daughter at bedside.   Hospital course :  Pt is an 85 y/o male with PMHx of HTN, HLD, CAD, HFpEF, s/p Right CEA for Carotid artery disease, CKD5 on HD 2d/week via LUE fistula, s/p flecainide w/ ILR placed 2020, AS s/p TAVR, PAD initially presented 10/21 , s/p RLE s/p angioplasty with 3 stents placed to the rt SFA, peroneal and popliteal artery. Early am 10 /29/20 pt went into CHB w/ multiple pauses up to 9-12 seconds, s/p RRT, ultimately requiring TVP placement in ICU by ICU PA.     P/E   Vital Signs Last 24 Hrs  T(C): 36.6 (29 Oct 2020 17:15), Max: 36.9 (29 Oct 2020 05:45)  T(F): 97.8 (29 Oct 2020 17:15), Max: 98.5 (29 Oct 2020 05:45)  HR: 93 (29 Oct 2020 18:52) (49 - 112)  BP: 177/95 (29 Oct 2020 18:52) (99/76 - 185/78)  BP(mean): 87 (29 Oct 2020 15:05) (78 - 129)  RR: 18 (29 Oct 2020 18:52) (14 - 32)  SpO2: 100% (29 Oct 2020 18:52) (91% - 100%)  General: An elderly male lying in bed not in distress.   HEENT: AT, NC. PERRL. intact EOM. no throat erythema or exudate.   Neck: supple. no JVD.   Chest: CTA bilaterally  Heart: S1,S2. RRR. no murmur , no edema.  Abdomen: soft. non-tender. non-distended. + BS.   Ext: no calf tenderness on either side, pt. moves toes on both sides without sig. pain, reports burning pain inside RLE, chronic. sensory intact.  Neuro: AAO x3. no focal weakness. no speech disorder. CNs intact.  Skin: rt. groin area with no noteable swelling or hematoma. no diaphoresis.   psych : no agitation, no si/hi.     MEDICATIONS  (STANDING):  aspirin enteric coated 81 milliGRAM(s) Oral daily  cloNIDine 0.1 milliGRAM(s) Oral at bedtime  clopidogrel Tablet 75 milliGRAM(s) Oral daily  darbepoetin Injectable ViaL 300 MICROGram(s) SubCutaneous every 2 weeks  dextrose 10%. 1000 milliLiter(s) (30 mL/Hr) IV Continuous <Continuous>  dextrose 5%. 1000 milliLiter(s) (50 mL/Hr) IV Continuous <Continuous>  dextrose 50% Injectable 12.5 Gram(s) IV Push once  dextrose 50% Injectable 25 Gram(s) IV Push once  dextrose 50% Injectable 25 Gram(s) IV Push once  doxazosin 1 milliGRAM(s) Oral at bedtime  DULoxetine 60 milliGRAM(s) Oral daily  epoetin juan-epbx (RETACRIT) Injectable 19560 Unit(s) IV Push <User Schedule>  hydrALAZINE 50 milliGRAM(s) Oral two times a day  insulin lispro (ADMELOG) corrective regimen sliding scale   SubCutaneous three times a day before meals  isosorbide   mononitrate ER Tablet (IMDUR) 30 milliGRAM(s) Oral daily  NIFEdipine XL 60 milliGRAM(s) Oral at bedtime  NIFEdipine XL 90 milliGRAM(s) Oral daily  senna 2 Tablet(s) Oral at bedtime  sevelamer carbonate 800 milliGRAM(s) Oral three times a day with meals    MEDICATIONS  (PRN):  acetaminophen   Tablet .. 650 milliGRAM(s) Oral every 6 hours PRN Mild Pain (1 - 3)  dextrose 40% Gel 15 Gram(s) Oral once PRN Blood Glucose LESS THAN 70 milliGRAM(s)/deciliter  glucagon  Injectable 1 milliGRAM(s) IntraMuscular once PRN Glucose LESS THAN 70 milligrams/deciliter  hydrALAZINE Injectable 10 milliGRAM(s) IV Push every 6 hours PRN SBP >180  HYDROmorphone  Injectable 0.5 milliGRAM(s) IV Push every 6 hours PRN Moderate Pain (4 - 6)  HYDROmorphone  Injectable 1 milliGRAM(s) IV Push every 6 hours PRN Severe Pain (7 - 10)  HYDROmorphone  Injectable 0.5 milliGRAM(s) IV Push every 15 minutes PRN Breakthrough pain  ibuprofen  Tablet. 600 milliGRAM(s) Oral every 8 hours PRN Moderate Pain (4 - 6)  ondansetron Injectable 4 milliGRAM(s) IV Push every 6 hours PRN Nausea    Labs :                          9.0    4.09  )-----------( 183      ( 29 Oct 2020 12:09 )             30.0     10    136  |  96<L>  |  42.0<H>  ----------------------------<  112<H>  4.2   |  26.0  |  3.69<H>    Ca    9.1      29 Oct 2020 15:32  Phos  1.3     10-29  Mg     1.9     10-29    TPro  x   /  Alb  3.2<L>  /  TBili  x   /  DBili  x   /  AST  x   /  ALT  x   /  AlkPhos  x   10-28    CARDIAC MARKERS ( 29 Oct 2020 06:24 )  x     / 0.12 ng/mL / x     / x     / x            Urinalysis Basic - ( 29 Oct 2020 10:04 )    Color: Yellow / Appearance: Clear / S.005 / pH: x  Gluc: x / Ketone: Trace  / Bili: Negative / Urobili: Negative mg/dL   Blood: x / Protein: 100 mg/dL / Nitrite: Negative   Leuk Esterase: Negative / RBC: 0-2 /HPF / WBC 3-5   Sq Epi: x / Non Sq Epi: Negative / Bacteria: Negative      PT/INR - ( 29 Oct 2020 13:34 )   PT: 12.7 sec;   INR: 1.10 ratio         PTT - ( 29 Oct 2020 13:34 )  PTT:38.0 sec    A/P   Pt is an 85 y/o male with PMHx of HTN, HLD, CAD, HFpEF, s/p Right CEA for Carotid artery disease, CKD5 on HD 2d/week via LUE fistula, s/p flecainide w/ ILR placed 2020, AS s/p TAVR, PAD initially presented 10/21 , s/p RLE s/p angioplasty with 3 stents placed to the rt SFA, peroneal and popliteal artery. Early am 10 /29/20 pt went into CHB w/ multiple pauses up to 9-12 seconds, initially had TVP in icu and now s/p leadless ppm.     - Complete Heart block, s/p PPM placement, ok to start aspirin and plavix as per cardiologist, eliquis on hold.     - PAD s/p angioplasty and stent x3, vascualr team following, on as a and plavix.    - ESRD on dialysis, followed by nephrology team.     - Hypophosphatemia, will replace and follow repeat level in am.    - Anemia, very likely of chronic disease and multifactorial, Hb 9, stable compared to old.     Hospitalist team will follow.

## 2020-10-30 LAB
ANION GAP SERPL CALC-SCNC: 18 MMOL/L — HIGH (ref 5–17)
APTT BLD: 34.9 SEC — SIGNIFICANT CHANGE UP (ref 27.5–35.5)
BUN SERPL-MCNC: 46 MG/DL — HIGH (ref 8–20)
CALCIUM SERPL-MCNC: 8.9 MG/DL — SIGNIFICANT CHANGE UP (ref 8.6–10.2)
CHLORIDE SERPL-SCNC: 98 MMOL/L — SIGNIFICANT CHANGE UP (ref 98–107)
CO2 SERPL-SCNC: 21 MMOL/L — LOW (ref 22–29)
COLLECT DURATION TIME UR: 24 HR — SIGNIFICANT CHANGE UP
CREAT SERPL-MCNC: 4.54 MG/DL — HIGH (ref 0.5–1.3)
GLUCOSE BLDC GLUCOMTR-MCNC: 122 MG/DL — HIGH (ref 70–99)
GLUCOSE BLDC GLUCOMTR-MCNC: 131 MG/DL — HIGH (ref 70–99)
GLUCOSE BLDC GLUCOMTR-MCNC: 155 MG/DL — HIGH (ref 70–99)
GLUCOSE BLDC GLUCOMTR-MCNC: 177 MG/DL — HIGH (ref 70–99)
GLUCOSE SERPL-MCNC: 104 MG/DL — HIGH (ref 70–99)
HCT VFR BLD CALC: 29.6 % — LOW (ref 39–50)
HGB BLD-MCNC: 8.6 G/DL — LOW (ref 13–17)
INR BLD: 1.15 RATIO — SIGNIFICANT CHANGE UP (ref 0.88–1.16)
MAGNESIUM SERPL-MCNC: 2.1 MG/DL — SIGNIFICANT CHANGE UP (ref 1.8–2.6)
MCHC RBC-ENTMCNC: 27.6 PG — SIGNIFICANT CHANGE UP (ref 27–34)
MCHC RBC-ENTMCNC: 29.1 GM/DL — LOW (ref 32–36)
MCV RBC AUTO: 94.9 FL — SIGNIFICANT CHANGE UP (ref 80–100)
PHOSPHATE SERPL-MCNC: 6 MG/DL — HIGH (ref 2.4–4.7)
PLATELET # BLD AUTO: 213 K/UL — SIGNIFICANT CHANGE UP (ref 150–400)
POTASSIUM SERPL-MCNC: 4.3 MMOL/L — SIGNIFICANT CHANGE UP (ref 3.5–5.3)
POTASSIUM SERPL-SCNC: 4.3 MMOL/L — SIGNIFICANT CHANGE UP (ref 3.5–5.3)
PROTHROM AB SERPL-ACNC: 13.3 SEC — SIGNIFICANT CHANGE UP (ref 10.6–13.6)
RBC # BLD: 3.12 M/UL — LOW (ref 4.2–5.8)
RBC # FLD: 15.7 % — HIGH (ref 10.3–14.5)
SODIUM SERPL-SCNC: 137 MMOL/L — SIGNIFICANT CHANGE UP (ref 135–145)
TOTAL VOLUME - 24 HOUR: 900 ML — SIGNIFICANT CHANGE UP
UUN 24H UR-MRATE: 2.8 G/24H — LOW (ref 6–24)
WBC # BLD: 4.72 K/UL — SIGNIFICANT CHANGE UP (ref 3.8–10.5)
WBC # FLD AUTO: 4.72 K/UL — SIGNIFICANT CHANGE UP (ref 3.8–10.5)

## 2020-10-30 PROCEDURE — 71046 X-RAY EXAM CHEST 2 VIEWS: CPT | Mod: 26

## 2020-10-30 PROCEDURE — 99233 SBSQ HOSP IP/OBS HIGH 50: CPT

## 2020-10-30 PROCEDURE — 99232 SBSQ HOSP IP/OBS MODERATE 35: CPT

## 2020-10-30 PROCEDURE — 93926 LOWER EXTREMITY STUDY: CPT | Mod: 26,RT

## 2020-10-30 PROCEDURE — 93010 ELECTROCARDIOGRAM REPORT: CPT

## 2020-10-30 RX ORDER — TRAMADOL HYDROCHLORIDE 50 MG/1
0.5 TABLET ORAL
Qty: 20 | Refills: 0
Start: 2020-10-30

## 2020-10-30 RX ORDER — IBUPROFEN 200 MG
1 TABLET ORAL
Qty: 30 | Refills: 0
Start: 2020-10-30

## 2020-10-30 RX ORDER — TRAMADOL HYDROCHLORIDE 50 MG/1
50 TABLET ORAL EVERY 6 HOURS
Refills: 0 | Status: DISCONTINUED | OUTPATIENT
Start: 2020-10-30 | End: 2020-10-31

## 2020-10-30 RX ORDER — PANTOPRAZOLE SODIUM 20 MG/1
40 TABLET, DELAYED RELEASE ORAL
Refills: 0 | Status: DISCONTINUED | OUTPATIENT
Start: 2020-10-30 | End: 2020-10-31

## 2020-10-30 RX ORDER — TRAMADOL HYDROCHLORIDE 50 MG/1
25 TABLET ORAL EVERY 6 HOURS
Refills: 0 | Status: DISCONTINUED | OUTPATIENT
Start: 2020-10-30 | End: 2020-10-31

## 2020-10-30 RX ORDER — APIXABAN 2.5 MG/1
1 TABLET, FILM COATED ORAL
Qty: 60 | Refills: 0
Start: 2020-10-30

## 2020-10-30 RX ADMIN — ISOSORBIDE MONONITRATE 30 MILLIGRAM(S): 60 TABLET, EXTENDED RELEASE ORAL at 12:09

## 2020-10-30 RX ADMIN — SENNA PLUS 2 TABLET(S): 8.6 TABLET ORAL at 21:17

## 2020-10-30 RX ADMIN — TRAMADOL HYDROCHLORIDE 50 MILLIGRAM(S): 50 TABLET ORAL at 07:24

## 2020-10-30 RX ADMIN — TRAMADOL HYDROCHLORIDE 25 MILLIGRAM(S): 50 TABLET ORAL at 09:25

## 2020-10-30 RX ADMIN — DULOXETINE HYDROCHLORIDE 60 MILLIGRAM(S): 30 CAPSULE, DELAYED RELEASE ORAL at 08:34

## 2020-10-30 RX ADMIN — SEVELAMER CARBONATE 800 MILLIGRAM(S): 2400 POWDER, FOR SUSPENSION ORAL at 08:34

## 2020-10-30 RX ADMIN — APIXABAN 2.5 MILLIGRAM(S): 2.5 TABLET, FILM COATED ORAL at 08:35

## 2020-10-30 RX ADMIN — Medication 10 MILLIGRAM(S): at 02:28

## 2020-10-30 RX ADMIN — Medication 650 MILLIGRAM(S): at 21:17

## 2020-10-30 RX ADMIN — TRAMADOL HYDROCHLORIDE 50 MILLIGRAM(S): 50 TABLET ORAL at 15:37

## 2020-10-30 RX ADMIN — Medication 600 MILLIGRAM(S): at 06:35

## 2020-10-30 RX ADMIN — Medication 0.1 MILLIGRAM(S): at 21:16

## 2020-10-30 RX ADMIN — Medication 50 MILLIGRAM(S): at 17:12

## 2020-10-30 RX ADMIN — Medication 1: at 17:12

## 2020-10-30 RX ADMIN — HYDROMORPHONE HYDROCHLORIDE 1 MILLIGRAM(S): 2 INJECTION INTRAMUSCULAR; INTRAVENOUS; SUBCUTANEOUS at 05:01

## 2020-10-30 RX ADMIN — SEVELAMER CARBONATE 800 MILLIGRAM(S): 2400 POWDER, FOR SUSPENSION ORAL at 17:12

## 2020-10-30 RX ADMIN — Medication 90 MILLIGRAM(S): at 05:01

## 2020-10-30 RX ADMIN — APIXABAN 2.5 MILLIGRAM(S): 2.5 TABLET, FILM COATED ORAL at 21:16

## 2020-10-30 RX ADMIN — TRAMADOL HYDROCHLORIDE 50 MILLIGRAM(S): 50 TABLET ORAL at 14:37

## 2020-10-30 RX ADMIN — HYDROMORPHONE HYDROCHLORIDE 1 MILLIGRAM(S): 2 INJECTION INTRAMUSCULAR; INTRAVENOUS; SUBCUTANEOUS at 05:30

## 2020-10-30 RX ADMIN — TRAMADOL HYDROCHLORIDE 50 MILLIGRAM(S): 50 TABLET ORAL at 08:24

## 2020-10-30 RX ADMIN — PANTOPRAZOLE SODIUM 40 MILLIGRAM(S): 20 TABLET, DELAYED RELEASE ORAL at 08:37

## 2020-10-30 RX ADMIN — Medication 60 MILLIGRAM(S): at 21:15

## 2020-10-30 RX ADMIN — Medication 650 MILLIGRAM(S): at 22:17

## 2020-10-30 RX ADMIN — Medication 1 MILLIGRAM(S): at 21:15

## 2020-10-30 RX ADMIN — SEVELAMER CARBONATE 800 MILLIGRAM(S): 2400 POWDER, FOR SUSPENSION ORAL at 12:09

## 2020-10-30 RX ADMIN — TRAMADOL HYDROCHLORIDE 25 MILLIGRAM(S): 50 TABLET ORAL at 10:25

## 2020-10-30 RX ADMIN — Medication 50 MILLIGRAM(S): at 05:01

## 2020-10-30 RX ADMIN — CLOPIDOGREL BISULFATE 75 MILLIGRAM(S): 75 TABLET, FILM COATED ORAL at 08:34

## 2020-10-30 RX ADMIN — Medication 600 MILLIGRAM(S): at 05:35

## 2020-10-30 NOTE — PROGRESS NOTE ADULT - NUTRITIONAL ASSESSMENT
This patient has been assessed with a concern for Malnutrition and has been determined to have a diagnosis/diagnoses of Moderate protein-calorie malnutrition.    This patient is being managed with:   Diet Regular-  DASH/TLC {Sodium & Cholesterol Restricted} (DASH)  1500mL Fluid Restriction ([id number])  For patients receiving Renal Replacement - No Protein Restr No Conc K No Conc Phos Low  Sodium (RENAL)  Supplement Feeding Modality:  Oral  Nepro Cans or Servings Per Day:  2       Frequency:  Daily  Entered: Oct 28 2020 10:45AM
This patient has been assessed with a concern for Malnutrition and has been determined to have a diagnosis/diagnoses of Moderate protein-calorie malnutrition.    This patient is being managed with:   Diet Regular-  DASH/TLC {Sodium & Cholesterol Restricted} (DASH)  1500mL Fluid Restriction ([id number])  For patients receiving Renal Replacement - No Protein Restr No Conc K No Conc Phos Low  Sodium (RENAL)  Supplement Feeding Modality:  Oral  Nepro Cans or Servings Per Day:  3       Frequency:  Daily  Entered: Oct 28 2020 10:45AM    
This patient has been assessed with a concern for Malnutrition and has been determined to have a diagnosis/diagnoses of Moderate protein-calorie malnutrition.    This patient is being managed with:   Diet Regular-  DASH/TLC {Sodium & Cholesterol Restricted} (DASH)  1500mL Fluid Restriction ([id number])  For patients receiving Renal Replacement - No Protein Restr No Conc K No Conc Phos Low  Sodium (RENAL)  Supplement Feeding Modality:  Oral  Nepro Cans or Servings Per Day:  2       Frequency:  Daily  Entered: Oct 28 2020 10:45AM
This patient has been assessed with a concern for Malnutrition and has been determined to have a diagnosis/diagnoses of Moderate protein-calorie malnutrition.    This patient is being managed with:   Diet Regular-  DASH/TLC {Sodium & Cholesterol Restricted} (DASH)  1500mL Fluid Restriction ([id number])  For patients receiving Renal Replacement - No Protein Restr No Conc K No Conc Phos Low  Sodium (RENAL)  Supplement Feeding Modality:  Oral  Nepro Cans or Servings Per Day:  2       Frequency:  Daily  Entered: Oct 28 2020 10:45AM
This patient has been assessed with a concern for Malnutrition and has been determined to have a diagnosis of Moderate protein-calorie malnutrition.    This patient is being managed with:     Diet Regular + Supplements,     Entered: Oct 27 2020  1:48PM

## 2020-10-30 NOTE — PROGRESS NOTE ADULT - SUBJECTIVE AND OBJECTIVE BOX
CHRISTIANO ELIZABETH    [id number]    86y      Male    Patient is a 86y old  Male who presents with a chief complaint of ESRD HD (30 Oct 2020 12:44)      INTERVAL HPI/OVERNIGHT EVENTS:    Patient is having some pain in the right lower extremity, denies fever, chills, chest pain, nausea, vomiting    REVIEW OF SYSTEMS:    CONSTITUTIONAL: No fever, some fatigue  RESPIRATORY: No cough, No shortness of breath  CARDIOVASCULAR: No chest pain, palpitations  GASTROINTESTINAL: No abdominal, No nausea, vomiting  NEUROLOGICAL: No headaches,  loss of strength.  MISCELLANEOUS: No joint swelling or pain, right lower ext pain       Vital Signs Last 24 Hrs  T(C): 36.9 (30 Oct 2020 10:02), Max: 36.9 (30 Oct 2020 04:57)  T(F): 98.4 (30 Oct 2020 10:02), Max: 98.4 (30 Oct 2020 04:57)  HR: 85 (30 Oct 2020 10:02) (68 - 97)  BP: 183/67 (30 Oct 2020 10:02) (157/54 - 210/93)  RR: 18 (30 Oct 2020 10:02) (16 - 22)  SpO2: 96% (30 Oct 2020 10:02) (92% - 100%)    PHYSICAL EXAM:    GENERAL: Elderly male looking comfortable   HEENT: PERRL, +EOMI  NECK: soft, Supple, No JVD,   CHEST/LUNG: Clear to auscultate bilaterally; No wheezing  HEART: S1S2+, Regular rate and rhythm; No murmurs  ABDOMEN: Soft, Nontender, Nondistended; Bowel sounds present  EXTREMITIES:   No edema, has some pulsation it the lower extremities.   SKIN: No rashes or lesions  NEURO: AAOX3, no focal deficits, no motor r sensory loss  PSYCH: normal mood      LABS:                        8.6    4.72  )-----------( 213      ( 30 Oct 2020 06:03 )             29.6     10-30    137  |  98  |  46.0<H>  ----------------------------<  104<H>  4.3   |  21.0<L>  |  4.54<H>    Ca    8.9      30 Oct 2020 06:03  Phos  6.0     10-30  Mg     2.1     10-30      PT/INR - ( 30 Oct 2020 06:03 )   PT: 13.3 sec;   INR: 1.15 ratio         PTT - ( 30 Oct 2020 06:03 )  PTT:34.9 sec  Urinalysis Basic - ( 29 Oct 2020 10:04 )    Color: Yellow / Appearance: Clear / S.005 / pH: x  Gluc: x / Ketone: Trace  / Bili: Negative / Urobili: Negative mg/dL   Blood: x / Protein: 100 mg/dL / Nitrite: Negative   Leuk Esterase: Negative / RBC: 0-2 /HPF / WBC 3-5   Sq Epi: x / Non Sq Epi: Negative / Bacteria: Negative          I&O's Summary    29 Oct 2020 07:  -  30 Oct 2020 07:00  --------------------------------------------------------  IN: 120 mL / OUT: 2300 mL / NET: -2180 mL    30 Oct 2020 07:01  -  30 Oct 2020 15:42  --------------------------------------------------------  IN: 480 mL / OUT: 350 mL / NET: 130 mL        MEDICATIONS  (STANDING):  apixaban 2.5 milliGRAM(s) Oral every 12 hours  cloNIDine 0.1 milliGRAM(s) Oral at bedtime  clopidogrel Tablet 75 milliGRAM(s) Oral daily  darbepoetin Injectable ViaL 300 MICROGram(s) SubCutaneous every 2 weeks  dextrose 10%. 1000 milliLiter(s) (30 mL/Hr) IV Continuous <Continuous>  dextrose 5%. 1000 milliLiter(s) (50 mL/Hr) IV Continuous <Continuous>  dextrose 50% Injectable 12.5 Gram(s) IV Push once  dextrose 50% Injectable 25 Gram(s) IV Push once  dextrose 50% Injectable 25 Gram(s) IV Push once  doxazosin 1 milliGRAM(s) Oral at bedtime  DULoxetine 60 milliGRAM(s) Oral daily  epoetin juan-epbx (RETACRIT) Injectable 75841 Unit(s) IV Push <User Schedule>  hydrALAZINE 50 milliGRAM(s) Oral two times a day  insulin lispro (ADMELOG) corrective regimen sliding scale   SubCutaneous three times a day before meals  isosorbide   mononitrate ER Tablet (IMDUR) 30 milliGRAM(s) Oral daily  NIFEdipine XL 60 milliGRAM(s) Oral at bedtime  NIFEdipine XL 90 milliGRAM(s) Oral daily  pantoprazole    Tablet 40 milliGRAM(s) Oral before breakfast  senna 2 Tablet(s) Oral at bedtime  sevelamer carbonate 800 milliGRAM(s) Oral three times a day with meals    MEDICATIONS  (PRN):  acetaminophen   Tablet .. 650 milliGRAM(s) Oral every 6 hours PRN Mild Pain (1 - 3)  dextrose 40% Gel 15 Gram(s) Oral once PRN Blood Glucose LESS THAN 70 milliGRAM(s)/deciliter  glucagon  Injectable 1 milliGRAM(s) IntraMuscular once PRN Glucose LESS THAN 70 milligrams/deciliter  hydrALAZINE Injectable 10 milliGRAM(s) IV Push every 6 hours PRN SBP >180  HYDROmorphone  Injectable 0.5 milliGRAM(s) IV Push every 6 hours PRN Moderate Pain (4 - 6)  HYDROmorphone  Injectable 1 milliGRAM(s) IV Push every 6 hours PRN Severe Pain (7 - 10)  ibuprofen  Tablet. 600 milliGRAM(s) Oral every 8 hours PRN Moderate Pain (4 - 6)  ondansetron Injectable 4 milliGRAM(s) IV Push every 6 hours PRN Nausea  traMADol 25 milliGRAM(s) Oral every 6 hours PRN Moderate Pain (4 - 6)  traMADol 50 milliGRAM(s) Oral every 6 hours PRN Severe Pain (7 - 10)

## 2020-10-30 NOTE — PHYSICAL THERAPY INITIAL EVALUATION ADULT - ASR EQUIP NEEDS DISCH PT EVAL
3:1 commode/rolling walker (5 inch wheels)/tub bench
rolling walker (5 inch wheels)/raised toilet seat/shower chair

## 2020-10-30 NOTE — PROGRESS NOTE ADULT - SUBJECTIVE AND OBJECTIVE BOX
[facility] DIVISION OF KIDNEY DISEASES AND HYPERTENSION -- FOLLOW UP NOTE  --------------------------------------------------------------------------------  Chief Complaint: ESRD in HD    24 hour events/subjective:  Pt seen/examined this AM;       PAST HISTORY  --------------------------------------------------------------------------------  No significant changes to PMH, PSH, FHx, SHx, unless otherwise noted    ALLERGIES & MEDICATIONS  --------------------------------------------------------------------------------  Allergies    Plavix (Hives)  Toprol-XL (Rash)    Intolerances      Standing Inpatient Medications  apixaban 2.5 milliGRAM(s) Oral every 12 hours  cloNIDine 0.1 milliGRAM(s) Oral at bedtime  clopidogrel Tablet 75 milliGRAM(s) Oral daily  darbepoetin Injectable ViaL 300 MICROGram(s) SubCutaneous every 2 weeks  dextrose 10%. 1000 milliLiter(s) IV Continuous <Continuous>  dextrose 5%. 1000 milliLiter(s) IV Continuous <Continuous>  dextrose 50% Injectable 12.5 Gram(s) IV Push once  dextrose 50% Injectable 25 Gram(s) IV Push once  dextrose 50% Injectable 25 Gram(s) IV Push once  doxazosin 1 milliGRAM(s) Oral at bedtime  DULoxetine 60 milliGRAM(s) Oral daily  epoetin juan-epbx (RETACRIT) Injectable 64017 Unit(s) IV Push <User Schedule>  hydrALAZINE 50 milliGRAM(s) Oral two times a day  insulin lispro (ADMELOG) corrective regimen sliding scale   SubCutaneous three times a day before meals  isosorbide   mononitrate ER Tablet (IMDUR) 30 milliGRAM(s) Oral daily  NIFEdipine XL 60 milliGRAM(s) Oral at bedtime  NIFEdipine XL 90 milliGRAM(s) Oral daily  pantoprazole    Tablet 40 milliGRAM(s) Oral before breakfast  senna 2 Tablet(s) Oral at bedtime  sevelamer carbonate 800 milliGRAM(s) Oral three times a day with meals    PRN Inpatient Medications  acetaminophen   Tablet .. 650 milliGRAM(s) Oral every 6 hours PRN  dextrose 40% Gel 15 Gram(s) Oral once PRN  glucagon  Injectable 1 milliGRAM(s) IntraMuscular once PRN  hydrALAZINE Injectable 10 milliGRAM(s) IV Push every 6 hours PRN  HYDROmorphone  Injectable 0.5 milliGRAM(s) IV Push every 6 hours PRN  HYDROmorphone  Injectable 1 milliGRAM(s) IV Push every 6 hours PRN  ibuprofen  Tablet. 600 milliGRAM(s) Oral every 8 hours PRN  ondansetron Injectable 4 milliGRAM(s) IV Push every 6 hours PRN  traMADol 25 milliGRAM(s) Oral every 6 hours PRN  traMADol 50 milliGRAM(s) Oral every 6 hours PRN      REVIEW OF SYSTEMS  --------------------------------------------------------------------------------  Gen: No weight changes, fatigue, fevers/chills, weakness  Skin: No rashes  Head/Eyes/Ears/Mouth: No headache; Normal hearing; Normal vision w/o blurriness; No sinus pain/discomfort, sore throat  Respiratory: No dyspnea, cough, wheezing, hemoptysis  CV: No chest pain, PND, orthopnea  GI: No abdominal pain, diarrhea, constipation, nausea, vomiting, melena, hematochezia  : No increased frequency, dysuria, hematuria, nocturia  MSK: No joint pain/swelling; no back pain; no edema  Neuro: No dizziness/lightheadedness, weakness, seizures, numbness, tingling  Heme: No easy bruising or bleeding  Endo: No heat/cold intolerance  Psych: No significant nervousness, anxiety, stress, depression    All other systems were reviewed and are negative, except as noted.    VITALS/PHYSICAL EXAM  --------------------------------------------------------------------------------  T(C): 36.9 (10-30-20 @ 10:02), Max: 36.9 (10-30-20 @ 04:57)  HR: 85 (10-30-20 @ 10:02) (64 - 97)  BP: 183/67 (10-30-20 @ 10:02) (144/58 - 210/93)  RR: 18 (10-30-20 @ 10:02) (16 - 22)  SpO2: 96% (10-30-20 @ 10:02) (92% - 100%)  Wt(kg): --  Height (cm): 165.1 (10-30-20 @ 01:52)  Weight (kg): 63.8 (10-30-20 @ 01:52)  BMI (kg/m2): 23.4 (10-30-20 @ 01:52)  BSA (m2): 1.7 (10-30-20 @ 01:52)      10-29-20 @ 07:01  -  10-30-20 @ 07:00  --------------------------------------------------------  IN: 120 mL / OUT: 2300 mL / NET: -2180 mL    10-30-20 @ 07:01  -  10-30-20 @ 12:44  --------------------------------------------------------  IN: 240 mL / OUT: 350 mL / NET: -110 mL      Physical Exam:  	Gen: NAD, Pale, ill-appearing  	HEENT: PERRL, supple neck,   	Pulm: CTA B/L  	CV: RRR, S1S2; no rub  	Back: No spinal or CVA tenderness; no sacral edema  	Abd: +BS, soft, nontender/nondistended  	: No suprapubic tenderness  	UE: Warm, FROM, no clubbing, intact strength; no edema; no asterixis  	LE: Warm, FROM, no clubbing, intact strength; no edema  	Neuro: No focal deficits,   	Psych: Normal affect and mood  	Skin: Warm, without rashes  	Vascular access:  BARBRA TONEY,     LABS/STUDIES  --------------------------------------------------------------------------------              8.6    4.72  >-----------<  213      [10-30-20 @ 06:03]              29.6     137  |  98  |  46.0  ----------------------------<  104      [10-30-20 @ 06:03]  4.3   |  21.0  |  4.54        Ca     8.9     [10-30-20 @ 06:03]      Mg     2.1     [10-30-20 @ 06:03]      Phos  6.0     [10-30-20 @ 06:03]      PT/INR: PT 13.3 , INR 1.15       [10-30-20 @ 06:03]  PTT: 34.9       [10-30-20 @ 06:03]    Troponin 0.12      [10-29-20 @ 06:24]    Creatinine Trend:  SCr 4.54 [10-30 @ 06:03]  SCr 3.69 [10-29 @ 15:32]  SCr 1.53 [10-29 @ 14:20]  SCr 5.06 [10-29 @ 06:24]  SCr 4.98 [10-28 @ 09:23]    Urinalysis - [10-29-20 @ 10:04]      Color Yellow / Appearance Clear / SG 1.005 / pH 6.5      Gluc 100 / Ketone Trace  / Bili Negative / Urobili Negative       Blood Small / Protein 100 / Leuk Est Negative / Nitrite Negative      RBC 0-2 / WBC 3-5 / Hyaline  / Gran  / Sq Epi  / Non Sq Epi Negative / Bacteria Negative      Iron 53, TIBC 266, %sat 20      [08-19-20 @ 06:32]  Ferritin 184      [08-19-20 @ 06:32]  PTH -- (Ca 9.6)      [08-19-20 @ 16:08]   91  Vitamin D (25OH) 26.4      [08-19-20 @ 16:08]  HbA1c 4.9      [08-09-18 @ 05:54]  TSH 2.16      [09-15-20 @ 02:01]

## 2020-10-30 NOTE — PROGRESS NOTE ADULT - SUBJECTIVE AND OBJECTIVE BOX
Patient seen and examined today in bed. Figure 8 suture removed from right groin without complication. Peripheral IV flushed well. Right IJ catheter removed as well without complication.   Device interrogation performed in my presence. Excellent Micra AV pacing and sensing  The patient offers no overnight complaints other than chronic LE pain, likely due to PVD.     EKG: AS RV paced at 73bpm; QRSD 156ms  TELE: Paced. No events    MEDICATIONS  (STANDING):  apixaban 2.5 milliGRAM(s) Oral every 12 hours  cloNIDine 0.1 milliGRAM(s) Oral at bedtime  clopidogrel Tablet 75 milliGRAM(s) Oral daily  darbepoetin Injectable ViaL 300 MICROGram(s) SubCutaneous every 2 weeks  dextrose 10%. 1000 milliLiter(s) (30 mL/Hr) IV Continuous <Continuous>  dextrose 5%. 1000 milliLiter(s) (50 mL/Hr) IV Continuous <Continuous>  dextrose 50% Injectable 12.5 Gram(s) IV Push once  dextrose 50% Injectable 25 Gram(s) IV Push once  dextrose 50% Injectable 25 Gram(s) IV Push once  doxazosin 1 milliGRAM(s) Oral at bedtime  DULoxetine 60 milliGRAM(s) Oral daily  epoetin juan-epbx (RETACRIT) Injectable 21712 Unit(s) IV Push <User Schedule>  hydrALAZINE 50 milliGRAM(s) Oral two times a day  insulin lispro (ADMELOG) corrective regimen sliding scale   SubCutaneous three times a day before meals  isosorbide   mononitrate ER Tablet (IMDUR) 30 milliGRAM(s) Oral daily  NIFEdipine XL 90 milliGRAM(s) Oral daily  NIFEdipine XL 60 milliGRAM(s) Oral at bedtime  pantoprazole    Tablet 40 milliGRAM(s) Oral before breakfast  senna 2 Tablet(s) Oral at bedtime  sevelamer carbonate 800 milliGRAM(s) Oral three times a day with meals    MEDICATIONS  (PRN):  acetaminophen   Tablet .. 650 milliGRAM(s) Oral every 6 hours PRN Mild Pain (1 - 3)  dextrose 40% Gel 15 Gram(s) Oral once PRN Blood Glucose LESS THAN 70 milliGRAM(s)/deciliter  glucagon  Injectable 1 milliGRAM(s) IntraMuscular once PRN Glucose LESS THAN 70 milligrams/deciliter  hydrALAZINE Injectable 10 milliGRAM(s) IV Push every 6 hours PRN SBP >180  HYDROmorphone  Injectable 0.5 milliGRAM(s) IV Push every 6 hours PRN Moderate Pain (4 - 6)  HYDROmorphone  Injectable 1 milliGRAM(s) IV Push every 6 hours PRN Severe Pain (7 - 10)  ibuprofen  Tablet. 600 milliGRAM(s) Oral every 8 hours PRN Moderate Pain (4 - 6)  ondansetron Injectable 4 milliGRAM(s) IV Push every 6 hours PRN Nausea  traMADol 25 milliGRAM(s) Oral every 6 hours PRN Moderate Pain (4 - 6)  traMADol 50 milliGRAM(s) Oral every 6 hours PRN Severe Pain (7 - 10)    Allergies  Plavix (Hives)  Toprol-XL (Rash)    PAST MEDICAL & SURGICAL HISTORY:  GI bleeding  due to ulcerated polyps and colonic AVMs  Aortic stenosis  - s/p valve replacement  ESRD on dialysis  HD on  and   Risk factors for obstructive sleep apnea  Anemia  RICHARDS (dyspnea on exertion)  VT (ventricular tachycardia)  HTN (hypertension)  CAD (coronary artery disease)  Arrhythmia  AV block, 1st degree  DM (diabetes mellitus)  - resolved  S/P TAVR (transcatheter aortic valve replacement)  H/O carotid endarterectomy  Right  A-V fistula  left arm 2017  H/O angioplasty  ,  no  intervention  H/O left knee surgery  H/O circumcision  at  age  65    Vital Signs Last 24 Hrs  T(C): 36.9 (30 Oct 2020 04:57), Max: 36.9 (30 Oct 2020 04:57)  T(F): 98.4 (30 Oct 2020 04:57), Max: 98.4 (30 Oct 2020 04:57)  HR: 78 (30 Oct 2020 04:57) (60 - 97)  BP: 180/53 (30 Oct 2020 04:57) (127/56 - 210/93)  BP(mean): 87 (29 Oct 2020 15:05) (78 - 129)  RR: 18 (30 Oct 2020 04:57) (15 - 22)  SpO2: 95% (30 Oct 2020 04:57) (92% - 100%)    Physical Exam:  Constitutional: NAD, thin, frail. Right IJ catheter present  Cardiovascular: +S1S2 RRR  Pulmonary: Coarse breath sounds but unlabored  GI: soft NTND +BS  Extremities: no pedal edema,  Right Groin: No hematoma. Excellent healing observed.   Neuro: non focal, MARQUEZ x4    LABS:                        8.6    4.72  )-----------( 213      ( 30 Oct 2020 06:03 )             29.6     137  |  98  |  46.0<H>  ----------------------------<  104<H>  4.3   |  21.0<L>  |  4.54<H>  Ca    8.9      30 Oct 2020 06:03  Phos  6.0     10-30  Mg     2.1     10-30  TPro  x   /  Alb  3.2<L>  /  TBili  x   /  DBili  x   /  AST  x   /  ALT  x   /  AlkPhos  x   10-28  PT/INR - ( 30 Oct 2020 06:03 )   PT: 13.3 sec;   INR: 1.15 ratio    PTT - ( 30 Oct 2020 06:03 )  PTT:34.9 sec    Urinalysis Basic - ( 29 Oct 2020 10:04 )  Color: Yellow / Appearance: Clear / S.005 / pH: x  Gluc: x / Ketone: Trace  / Bili: Negative / Urobili: Negative mg/dL   Blood: x / Protein: 100 mg/dL / Nitrite: Negative   Leuk Esterase: Negative / RBC: 0-2 /HPF / WBC 3-5   Sq Epi: x / Non Sq Epi: Negative / Bacteria: Negative    A/P  86 year old male patient with a history of anomalous right coronary artery, CAD, hypertension, hyperlipidemia, HFpEF, carotid disease s/p remote CEA, stage 5 CKD with LUE fistula, 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 Patient now status post uncomplicated MDT Micra Leadless pacemaker implant (VDD at 50bpm).     - PA/LAT CXR pending  - Right IJ catheter removed  - Excellent Micra pacemaker function noted  - Resume Eliquis this AM  - Discharge planning ok from EP perspective.   - Outpatient follow up with EP arranged in two weeks time.

## 2020-10-30 NOTE — PHYSICAL THERAPY INITIAL EVALUATION ADULT - MANUAL MUSCLE TESTING RESULTS, REHAB EVAL
bilateral shoulder flex 4/5, elbow flex 4/5, hip flex 4/5,knee ext 4/5, left ankle df 3+/5, right ankle df assessment limited by pain
BLE WFL

## 2020-10-30 NOTE — PHYSICAL THERAPY INITIAL EVALUATION ADULT - LEVEL OF INDEPENDENCE: STAIR NEGOTIATION, REHAB EVAL
safety concerns due to right foot pain with amb on level surfaces/unable to perform
unable to weight bear on RLE/unable to perform

## 2020-10-30 NOTE — PROGRESS NOTE ADULT - SUBJECTIVE AND OBJECTIVE BOX
Events of yesterday noted. Pt is S/P leadless PPM implant and downgrade last PM. Pt c/o RLE rest pain this am but reports improvement with tramadol. Denies any CP, SOB, N/V.     MEDICATIONS  (STANDING):  apixaban 2.5 milliGRAM(s) Oral every 12 hours  cloNIDine 0.1 milliGRAM(s) Oral at bedtime  clopidogrel Tablet 75 milliGRAM(s) Oral daily  darbepoetin Injectable ViaL 300 MICROGram(s) SubCutaneous every 2 weeks  dextrose 10%. 1000 milliLiter(s) (30 mL/Hr) IV Continuous <Continuous>  dextrose 5%. 1000 milliLiter(s) (50 mL/Hr) IV Continuous <Continuous>  dextrose 50% Injectable 12.5 Gram(s) IV Push once  dextrose 50% Injectable 25 Gram(s) IV Push once  dextrose 50% Injectable 25 Gram(s) IV Push once  doxazosin 1 milliGRAM(s) Oral at bedtime  DULoxetine 60 milliGRAM(s) Oral daily  epoetin juan-epbx (RETACRIT) Injectable 27066 Unit(s) IV Push <User Schedule>  hydrALAZINE 50 milliGRAM(s) Oral two times a day  insulin lispro (ADMELOG) corrective regimen sliding scale   SubCutaneous three times a day before meals  isosorbide   mononitrate ER Tablet (IMDUR) 30 milliGRAM(s) Oral daily  NIFEdipine XL 60 milliGRAM(s) Oral at bedtime  NIFEdipine XL 90 milliGRAM(s) Oral daily  pantoprazole    Tablet 40 milliGRAM(s) Oral before breakfast  senna 2 Tablet(s) Oral at bedtime  sevelamer carbonate 800 milliGRAM(s) Oral three times a day with meals    MEDICATIONS  (PRN):  acetaminophen   Tablet .. 650 milliGRAM(s) Oral every 6 hours PRN Mild Pain (1 - 3)  dextrose 40% Gel 15 Gram(s) Oral once PRN Blood Glucose LESS THAN 70 milliGRAM(s)/deciliter  glucagon  Injectable 1 milliGRAM(s) IntraMuscular once PRN Glucose LESS THAN 70 milligrams/deciliter  hydrALAZINE Injectable 10 milliGRAM(s) IV Push every 6 hours PRN SBP >180  HYDROmorphone  Injectable 0.5 milliGRAM(s) IV Push every 6 hours PRN Moderate Pain (4 - 6)  HYDROmorphone  Injectable 1 milliGRAM(s) IV Push every 6 hours PRN Severe Pain (7 - 10)  ibuprofen  Tablet. 600 milliGRAM(s) Oral every 8 hours PRN Moderate Pain (4 - 6)  ondansetron Injectable 4 milliGRAM(s) IV Push every 6 hours PRN Nausea  traMADol 25 milliGRAM(s) Oral every 6 hours PRN Moderate Pain (4 - 6)  traMADol 50 milliGRAM(s) Oral every 6 hours PRN Severe Pain (7 - 10)    ICU Vital Signs Last 24 Hrs  T(C): 36.9 (30 Oct 2020 04:57), Max: 36.9 (30 Oct 2020 04:57)  T(F): 98.4 (30 Oct 2020 04:57), Max: 98.4 (30 Oct 2020 04:57)  HR: 78 (30 Oct 2020 04:57) (60 - 97)  BP: 180/53 (30 Oct 2020 04:57) (137/48 - 210/93)  BP(mean): 87 (29 Oct 2020 15:05) (81 - 129)  ABP: --  ABP(mean): --  RR: 18 (30 Oct 2020 04:57) (15 - 22)  SpO2: 95% (30 Oct 2020 04:57) (92% - 100%)    Physical exam:   General: A&Ox3, NAD.   Abdomen: Soft, NT,ND  Ext: LUE AVF w improved ecchymosis and palp thrill. Monophasic signal of RLE AT, dependent rubor of RLE w no tissue loss  R groin access site suture removed; no hematoma or ecchymosis                                       8.6    4.72  )-----------( 213      ( 30 Oct 2020 06:03 )             29.6   10-30    137  |  98  |  46.0<H>  ----------------------------<  104<H>  4.3   |  21.0<L>  |  4.54<H>    Ca    8.9      30 Oct 2020 06:03  Phos  6.0     10-30  Mg     2.1     10-30    TPro  x   /  Alb  3.2<L>  /  TBili  x   /  DBili  x   /  AST  x   /  ALT  x   /  AlkPhos  x   10-28    CAPILLARY BLOOD GLUCOSE  POCT Blood Glucose.: 122 mg/dL (30 Oct 2020 08:12)  POCT Blood Glucose.: 98 mg/dL (29 Oct 2020 22:11)  POCT Blood Glucose.: 99 mg/dL (29 Oct 2020 13:56)  POCT Blood Glucose.: 89 mg/dL (29 Oct 2020 11:59)

## 2020-10-30 NOTE — PHYSICAL THERAPY INITIAL EVALUATION ADULT - ACTIVE RANGE OF MOTION EXAMINATION, REHAB EVAL
bilateral  lower extremity Active ROM was WFL (within functional limits)/bilateral upper extremity Active ROM was WFL (within functional limits)/except right ankle df limited by pain

## 2020-10-30 NOTE — PHYSICAL THERAPY INITIAL EVALUATION ADULT - PASSIVE RANGE OF MOTION EXAMINATION, REHAB EVAL
bilateral lower extremity Passive ROM was WFL (within functional limits)/except right ankle df ~-5 degrees/bilateral upper extremity Passive ROM was WFL (within functional limits)

## 2020-10-30 NOTE — PHYSICAL THERAPY INITIAL EVALUATION ADULT - GENERAL OBSERVATIONS, REHAB EVAL
awake in bed on 2L/min o2 via nc, +telemonitor with , right foot/ankle with ace wrap(for edema per NP, sceguccia)
Pt received supine in bed, c/o "10/10" pain despite pain meds, pt agreeable to PT

## 2020-10-30 NOTE — CHART NOTE - NSCHARTNOTESELECT_GEN_ALL_CORE
Malnutrition Notification
Event Note
Event Note/Post operative check
Nutrition Services
Rapid Response

## 2020-10-30 NOTE — PHYSICAL THERAPY INITIAL EVALUATION ADULT - DIAGNOSIS, PT EVAL
gait disturbance due to pain and decreased strength
functional debility 2*2 RLE weakness and difficulty weightbearing on RLE

## 2020-10-30 NOTE — PHYSICAL THERAPY INITIAL EVALUATION ADULT - PLANNED THERAPY INTERVENTIONS, PT EVAL
strengthening/transfer training/gait training/bed mobility training/balance training/stairs
ROM/balance training/bed mobility training/gait training/postural re-education/strengthening/transfer training

## 2020-10-30 NOTE — CHART NOTE - NSCHARTNOTEFT_GEN_A_CORE
Source: Patient [ ]  Family [ ]   other [x ]    Current Diet: Diet, Consistent Carbohydrate Renal/No Snacks (10-30-20 @ 04:00)    PO intake:  < 50% [ ]   50-75%  [ ]   %  [ x]  other :    Current Weight:   (10/29) 152.3 lbs  (10/28) 164 lbs  (10/26) 153 lbs  -Obtain daily wts, continue to monitor. No edema noted.     Pertinent Medications: MEDICATIONS  (STANDING):  apixaban 2.5 milliGRAM(s) Oral every 12 hours  cloNIDine 0.1 milliGRAM(s) Oral at bedtime  clopidogrel Tablet 75 milliGRAM(s) Oral daily  darbepoetin Injectable ViaL 300 MICROGram(s) SubCutaneous every 2 weeks  dextrose 10%. 1000 milliLiter(s) (30 mL/Hr) IV Continuous <Continuous>  dextrose 5%. 1000 milliLiter(s) (50 mL/Hr) IV Continuous <Continuous>  dextrose 50% Injectable 12.5 Gram(s) IV Push once  dextrose 50% Injectable 25 Gram(s) IV Push once  dextrose 50% Injectable 25 Gram(s) IV Push once  doxazosin 1 milliGRAM(s) Oral at bedtime  DULoxetine 60 milliGRAM(s) Oral daily  epoetin juan-epbx (RETACRIT) Injectable 32188 Unit(s) IV Push <User Schedule>  hydrALAZINE 50 milliGRAM(s) Oral two times a day  insulin lispro (ADMELOG) corrective regimen sliding scale   SubCutaneous three times a day before meals  isosorbide   mononitrate ER Tablet (IMDUR) 30 milliGRAM(s) Oral daily  NIFEdipine XL 60 milliGRAM(s) Oral at bedtime  NIFEdipine XL 90 milliGRAM(s) Oral daily  pantoprazole    Tablet 40 milliGRAM(s) Oral before breakfast  senna 2 Tablet(s) Oral at bedtime  sevelamer carbonate 800 milliGRAM(s) Oral three times a day with meals    MEDICATIONS  (PRN):  acetaminophen   Tablet .. 650 milliGRAM(s) Oral every 6 hours PRN Mild Pain (1 - 3)  dextrose 40% Gel 15 Gram(s) Oral once PRN Blood Glucose LESS THAN 70 milliGRAM(s)/deciliter  glucagon  Injectable 1 milliGRAM(s) IntraMuscular once PRN Glucose LESS THAN 70 milligrams/deciliter  hydrALAZINE Injectable 10 milliGRAM(s) IV Push every 6 hours PRN SBP >180  HYDROmorphone  Injectable 0.5 milliGRAM(s) IV Push every 6 hours PRN Moderate Pain (4 - 6)  HYDROmorphone  Injectable 1 milliGRAM(s) IV Push every 6 hours PRN Severe Pain (7 - 10)  ibuprofen  Tablet. 600 milliGRAM(s) Oral every 8 hours PRN Moderate Pain (4 - 6)  ondansetron Injectable 4 milliGRAM(s) IV Push every 6 hours PRN Nausea  traMADol 25 milliGRAM(s) Oral every 6 hours PRN Moderate Pain (4 - 6)  traMADol 50 milliGRAM(s) Oral every 6 hours PRN Severe Pain (7 - 10)    Pertinent Labs: CBC Full  -  ( 30 Oct 2020 06:03 )  WBC Count : 4.72 K/uL  RBC Count : 3.12 M/uL  Hemoglobin : 8.6 g/dL  Hematocrit : 29.6 %  Platelet Count - Automated : 213 K/uL  Mean Cell Volume : 94.9 fl  Mean Cell Hemoglobin : 27.6 pg  Mean Cell Hemoglobin Concentration : 29.1 gm/dL  Auto Neutrophil # : x  Auto Lymphocyte # : x  Auto Monocyte # : x  Auto Eosinophil # : x  Auto Basophil # : x  Auto Neutrophil % : x  Auto Lymphocyte % : x  Auto Monocyte % : x  Auto Eosinophil % : x  Auto Basophil % : x  10-30 Na137 mmol/L Glu 104 mg/dL<H> K+ 4.3 mmol/L Cr  4.54 mg/dL<H> BUN 46.0 mg/dL<H> Phos 6.0 mg/dL<H> Alb n/a   PAB n/a       Skin: surgical incision    Nutrition focused physical exam previously conducted - found signs of malnutrition [ ]absent [ x]present    Subcutaneous fat loss: [x ] Orbital fat pads region, [ ]Buccal fat region, [ ]Triceps region,  [ ]Ribs region    Muscle wasting: [ x]Temples region, [ x]Clavicle region, [x ]Shoulder region, [ ]Scapula region, [ ]Interosseous region,  [ ]thigh region, [ ]Calf region    Estimated Needs:   [x ] no change since previous assessment  [ ] recalculated:     Current Nutrition Diagnosis: Pt remains at high nutrition risk and meets criteria for moderate (chronic) malnutrition related to inability to consume increased protein energy intake in setting of ESRD on HD and multiple hospitalizations as evidenced by pt with moderate muscle wasting/fat loss, and likely meeting <75% est energy intake. Pt unavailable for interview, multiple attempts made. Pt continues with good po intake per documentation, consuming 75% meals. Last documented BM 10/24.     Recommendations:   1) Nepro BID to optimize po intake and provide an additional 425 kcal, 19.1g protein per serving   2) RX: Nephro-pito daily  3) Monitor wts and labs    Monitoring and Evaluation:   [ x] PO intake [x ] Tolerance to diet prescription [X] Weights  [X] Follow up per protocol [X] Labs:

## 2020-10-30 NOTE — PROGRESS NOTE ADULT - ASSESSMENT
85 y/o male with PMHx of HTN, HLD, CAD, HFpEF, s/p Right CEA for Carotid artery disease, CKD5 on HD 2d/week via LUE fistula, s/p flecainide w/ ILR placed 6/2020, AS s/p TAVR, PAD initially presented 10/21 , s/p RLE s/p angioplasty with 3 stents placed to the rt SFA, peroneal and popliteal artery. Early am 10 /29/20 pt went into CHB w/ multiple pauses up to 9-12 seconds, initially had TVP in icu and now s/p leadless ppm.     - Complete Heart block, s/p PPM placement, ok to start aspirin and plavix as per cardiologist, eliquis on hold, will be resume tomorrow as per cardiology.      - PAD s/p angioplasty and stent x3, vascualr team following, on as a and plavix, wanted to monitor patient for 24 hours    - ESRD on dialysis, followed by nephrology team, dialysis session tomorrow.     - Hypophosphatemia: replaced and follow repeat level in am.    - Anemia, very likely of chronic disease and multifactorial, Hb 9, stable compared to old.     Hospitalist team is signing off, please call for any question, spoke with Vascular NP Marilu.

## 2020-10-30 NOTE — PHYSICAL THERAPY INITIAL EVALUATION ADULT - GAIT DEVIATIONS NOTED, PT EVAL
decreased weight-shifting ability
decreased chadd/decreased step length/decreased weight-shifting ability

## 2020-10-30 NOTE — PHYSICAL THERAPY INITIAL EVALUATION ADULT - BALANCE DISTURBANCE, IDENTIFIED IMPAIRMENT CONTRIBUTE, REHAB EVAL
pain/decreased strength
decreased ROM/impaired sensory feedback/decreased strength/impaired postural control

## 2020-10-30 NOTE — PHYSICAL THERAPY INITIAL EVALUATION ADULT - CRITERIA FOR SKILLED THERAPEUTIC INTERVENTIONS
functional limitations in following categories/impairments found/therapy frequency/rehab potential/anticipated equipment needs at discharge/risk reduction/prevention/anticipated discharge recommendation
anticipated discharge recommendation/impairments found/Home with 24/7 supervision/assist vs Acute (family not agreeable to [facility])

## 2020-10-30 NOTE — PHYSICAL THERAPY INITIAL EVALUATION ADULT - TRANSFER SAFETY CONCERNS NOTED: SIT/STAND, REHAB EVAL
decreased weight-shifting ability
decreased step length/decreased weight-shifting ability/losing balance

## 2020-10-30 NOTE — PHYSICAL THERAPY INITIAL EVALUATION ADULT - IMPAIRMENTS CONTRIBUTING TO GAIT DEVIATIONS, PT EVAL
pain/decreased strength/impaired balance
pain/impaired postural control/impaired balance/decreased strength/impaired sensory feedback

## 2020-10-30 NOTE — PHYSICAL THERAPY INITIAL EVALUATION ADULT - PERTINENT HX OF CURRENT PROBLEM, REHAB EVAL
Pt is an 87 y/o male with PMHx of HTN, HLD, CAD, HFpEF, s/p Right CEA for Carotid artery disease, CKD5 on HD 2d/week via LUE fistula, s/p flecainide w/ ILR placed 6/2020, AS s/p TAVR, PAD initially presented 10/21 , s/p RLE s/p angioplasty with 3 stents placed to the rt SFA, peroneal and popliteal artery. Early am 10 /29/20 pt went into [facility] w/ multiple pauses up to 9-12 seconds, initially had TVP in icu and now s/p leadless ppm.
85 yo M w/ PAD s/p RLE angiogram with angioplasty in june 2020 presents w/ persistent RLE pain. describes pain as shooting pain in his right foot. no alleviating factors. exacerbated with prolonged standing or walking. Pt now S/P RLE angiogram SFA balloon angioplasty/peroneal angioplasty and popliteal stents x 3

## 2020-10-30 NOTE — PHYSICAL THERAPY INITIAL EVALUATION ADULT - IMPAIRED TRANSFERS: SIT/STAND, REHAB EVAL
decreased strength/impaired balance/pain
impaired balance/pain/impaired sensory feedback/decreased strength/impaired postural control

## 2020-10-30 NOTE — PROGRESS NOTE ADULT - ASSESSMENT
86M w/ CLI s/p RLE angiogram with angioplasty June 2020 initially presenting w/persistent RLE rest pain, now s/p RLE angio/SFA angioplasty/peroneal angioplasty and pop stents x 3 on 10/22  -Cont to have RLE foot pain. Will obtain RLE arterial duplex for further evaluation  -Eliquis resumed following PPM  -cont Plavix  -HD per renal  -PT eval  -Prepare for possible dc next 24 hours

## 2020-10-30 NOTE — PROGRESS NOTE ADULT - ASSESSMENT
1) ESRD on HD  2) MBD of renal dx  3) Anemia of renal dx  4) Vol HTN    Plan for HD in AM  On retacrit 12k units;   continue with antihypertensives    dw vascular this AM

## 2020-10-30 NOTE — PROGRESS NOTE ADULT - SUBJECTIVE AND OBJECTIVE BOX
[facility] - [facility]                                   [address] [place] [address]                                                      PHONE: [phone]                                                         FAX: [phone]  [email or web address]  ---------------------------------------------------------------------------------------------------------------------------------    Overnight events/patient complaints:     S/P Micra PPM  R groin no hematoma  INTERROGATED TODAY NL Function    PAST MEDICAL & SURGICAL HISTORY:  GI bleeding  due to ulcerated polyps and colonic AVMs    Aortic stenosis  - s/p valve replacement    ESRD on dialysis  HD on  and     Risk factors for obstructive sleep apnea    Anemia    RICHARDS (dyspnea on exertion)    VT (ventricular tachycardia)    HTN (hypertension)    CAD (coronary artery disease)    Arrhythmia    AV block, 1st degree    DM (diabetes mellitus)  - resolved    S/P TAVR (transcatheter aortic valve replacement)    H/O carotid endarterectomy  Right    A-V fistula  left arm 2017    H/O angioplasty  ,  no  intervention    H/O left knee surgery    H/O circumcision  at  age  65      Plavix (Hives)  Toprol-XL (Rash)    MEDICATIONS  (STANDING):  apixaban 2.5 milliGRAM(s) Oral every 12 hours  cloNIDine 0.1 milliGRAM(s) Oral at bedtime  clopidogrel Tablet 75 milliGRAM(s) Oral daily  darbepoetin Injectable ViaL 300 MICROGram(s) SubCutaneous every 2 weeks  dextrose 10%. 1000 milliLiter(s) (30 mL/Hr) IV Continuous <Continuous>  dextrose 5%. 1000 milliLiter(s) (50 mL/Hr) IV Continuous <Continuous>  dextrose 50% Injectable 12.5 Gram(s) IV Push once  dextrose 50% Injectable 25 Gram(s) IV Push once  dextrose 50% Injectable 25 Gram(s) IV Push once  doxazosin 1 milliGRAM(s) Oral at bedtime  DULoxetine 60 milliGRAM(s) Oral daily  epoetin juan-epbx (RETACRIT) Injectable 35605 Unit(s) IV Push <User Schedule>  hydrALAZINE 50 milliGRAM(s) Oral two times a day  insulin lispro (ADMELOG) corrective regimen sliding scale   SubCutaneous three times a day before meals  isosorbide   mononitrate ER Tablet (IMDUR) 30 milliGRAM(s) Oral daily  NIFEdipine XL 60 milliGRAM(s) Oral at bedtime  NIFEdipine XL 90 milliGRAM(s) Oral daily  pantoprazole    Tablet 40 milliGRAM(s) Oral before breakfast  senna 2 Tablet(s) Oral at bedtime  sevelamer carbonate 800 milliGRAM(s) Oral three times a day with meals    MEDICATIONS  (PRN):  acetaminophen   Tablet .. 650 milliGRAM(s) Oral every 6 hours PRN Mild Pain (1 - 3)  dextrose 40% Gel 15 Gram(s) Oral once PRN Blood Glucose LESS THAN 70 milliGRAM(s)/deciliter  glucagon  Injectable 1 milliGRAM(s) IntraMuscular once PRN Glucose LESS THAN 70 milligrams/deciliter  hydrALAZINE Injectable 10 milliGRAM(s) IV Push every 6 hours PRN SBP >180  HYDROmorphone  Injectable 0.5 milliGRAM(s) IV Push every 6 hours PRN Moderate Pain (4 - 6)  HYDROmorphone  Injectable 1 milliGRAM(s) IV Push every 6 hours PRN Severe Pain (7 - 10)  ibuprofen  Tablet. 600 milliGRAM(s) Oral every 8 hours PRN Moderate Pain (4 - 6)  ondansetron Injectable 4 milliGRAM(s) IV Push every 6 hours PRN Nausea  traMADol 25 milliGRAM(s) Oral every 6 hours PRN Moderate Pain (4 - 6)  traMADol 50 milliGRAM(s) Oral every 6 hours PRN Severe Pain (7 - 10)      Vital Signs Last 24 Hrs  T(C): 36.9 (30 Oct 2020 04:57), Max: 36.9 (30 Oct 2020 04:57)  T(F): 98.4 (30 Oct 2020 04:57), Max: 98.4 (30 Oct 2020 04:57)  HR: 78 (30 Oct 2020 04:57) (60 - 97)  BP: 180/53 (30 Oct 2020 04:57) (137/48 - 210/93)  BP(mean): 87 (29 Oct 2020 15:05) (81 - 129)  RR: 18 (30 Oct 2020 04:57) (15 - 22)  SpO2: 95% (30 Oct 2020 04:57) (92% - 100%)  ICU Vital Signs Last 24 Hrs  CHRISTIANO ELIZABETH  I&O's Detail    29 Oct 2020 07:01  -  30 Oct 2020 07:00  --------------------------------------------------------  IN:    Oral Fluid: 120 mL  Total IN: 120 mL    OUT:    Intermittent Catheterization - Urethral (mL): 600 mL    Other (mL): 1500 mL    Voided (mL): 200 mL  Total OUT: 2300 mL    Total NET: -2180 mL        I&O's Summary    29 Oct 2020 07:01  -  30 Oct 2020 07:00  --------------------------------------------------------  IN: 120 mL / OUT: 2300 mL / NET: -2180 mL      Drug Dosing Weight  CHRISTIANO GLENN    REVIEW OF SYSTEMS:    Constitutional: No fever, weight loss or fatigue  Eyes: No eye pain, visual disturbances, or discharge  ENMT:  No difficulty hearing, tinnitus, vertigo; No sinus or throat pain  Neck: No pain or stiffness  Respiratory: No cough, wheezing, chills or hemoptysis  Cardiovascular: No chest pain, palpitations, shortness of breath, dizziness or leg swelling  Gastrointestinal: No abdominal or epigastric pain. No nausea, vomiting or hematemesis; No diarrhea or constipation. No melena or hematochezia.  Genitourinary: No dysuria, frequency, hematuria or incontinence  Rectal: No pain, hemorrhoids or incontinence  Neurological: No headaches, memory loss, loss of strength, numbness or tremors  Skin: No itching, burning, rashes or lesions   Lymph Nodes: No enlarged glands  Endocrine: No heat or cold intolerance; No hair loss  Musculoskeletal: No joint pain or swelling; No muscle, back or extremity pain  Psychiatric: No depression, anxiety, mood swings or difficulty sleeping  Heme/Lymph: No easy bruising or bleeding gums  Allergy and Immunologic: No hives or eczema    PHYSICAL EXAM:  General: Appears well developed, well nourished alert and cooperative.  HEENT: Head; normocephalic, atraumatic.  Eyes: Pupils reactive, cornea wnl.  Neck: Supple, no nodes adenopathy, no NVD or carotid bruit or thyromegaly.  CARDIOVASCULAR: Normal S1 and S2, No murmur, rub, gallop or lift.   LUNGS: No rales, rhonchi or wheeze. Normal breath sounds bilaterally.  ABDOMEN: Soft, nontender without mass or organomegaly. bowel sounds normoactive.  EXTREMITIES: No clubbing, cyanosis or edema. Distal pulses wnl.   SKIN: warm and dry with normal turgor.  NEURO: Alert/oriented x 3/normal motor exam. No pathologic reflexes.    PSYCH: normal affect.        LABS:                        8.6    4.72  )-----------( 213      ( 30 Oct 2020 06:03 )             29.6     10-30    137  |  98  |  46.0<H>  ----------------------------<  104<H>  4.3   |  21.0<L>  |  4.54<H>    Ca    8.9      30 Oct 2020 06:03  Phos  6.0     10-30  Mg     2.1     10-30      CHRISTIANO PROETTA  CARDIAC MARKERS ( 29 Oct 2020 06:24 )  x     / 0.12 ng/mL / x     / x     / x          PT/INR - ( 30 Oct 2020 06:03 )   PT: 13.3 sec;   INR: 1.15 ratio         PTT - ( 30 Oct 2020 06:03 )  PTT:34.9 sec  Urinalysis Basic - ( 29 Oct 2020 10:04 )    Color: Yellow / Appearance: Clear / S.005 / pH: x  Gluc: x / Ketone: Trace  / Bili: Negative / Urobili: Negative mg/dL   Blood: x / Protein: 100 mg/dL / Nitrite: Negative   Leuk Esterase: Negative / RBC: 0-2 /HPF / WBC 3-5   Sq Epi: x / Non Sq Epi: Negative / Bacteria: Negative        RADIOLOGY & ADDITIONAL STUDIES:    INTERPRETATION OF TELEMETRY (personally reviewed):    ECG:    ECHO:    STRESS TEST:    CARDIAC CATHETERIZATION:    ACTIVE PROBLEMS:  HEALTH ISSUES - PROBLEM Dx:  Anemia  Anemia

## 2020-10-30 NOTE — PHYSICAL THERAPY INITIAL EVALUATION ADULT - NEUROVASCULAR ASSESSMENT RLE
no numbness/warm/exam limited by right foot ace wrap/no tingling
increased sensitivity/tenderness to touch on right foot, jeanna. dorsal aspect of ankle

## 2020-10-30 NOTE — PHYSICAL THERAPY INITIAL EVALUATION ADULT - IMPAIRMENTS FOUND, PT EVAL
gait, locomotion, and balance/muscle strength
muscle strength/posture/gait, locomotion, and balance/circulation/ROM

## 2020-10-30 NOTE — PHYSICAL THERAPY INITIAL EVALUATION ADULT - GROSSLY INTACT, SENSORY
Grossly Intact/pt with inconsistent responses to LE testing below level of the ankle/Left UE/Right UE

## 2020-10-31 ENCOUNTER — NON-APPOINTMENT (OUTPATIENT)
Age: 85
End: 2020-10-31

## 2020-10-31 VITALS
HEART RATE: 76 BPM | SYSTOLIC BLOOD PRESSURE: 144 MMHG | TEMPERATURE: 98 F | OXYGEN SATURATION: 97 % | DIASTOLIC BLOOD PRESSURE: 45 MMHG | RESPIRATION RATE: 18 BRPM

## 2020-10-31 LAB
GLUCOSE BLDC GLUCOMTR-MCNC: 109 MG/DL — HIGH (ref 70–99)
HCT VFR BLD CALC: 27.7 % — LOW (ref 39–50)
HGB BLD-MCNC: 8.2 G/DL — LOW (ref 13–17)
MCHC RBC-ENTMCNC: 27.6 PG — SIGNIFICANT CHANGE UP (ref 27–34)
MCHC RBC-ENTMCNC: 29.6 GM/DL — LOW (ref 32–36)
MCV RBC AUTO: 93.3 FL — SIGNIFICANT CHANGE UP (ref 80–100)
PLATELET # BLD AUTO: 224 K/UL — SIGNIFICANT CHANGE UP (ref 150–400)
RBC # BLD: 2.97 M/UL — LOW (ref 4.2–5.8)
RBC # FLD: 15.8 % — HIGH (ref 10.3–14.5)
WBC # BLD: 5.69 K/UL — SIGNIFICANT CHANGE UP (ref 3.8–10.5)
WBC # FLD AUTO: 5.69 K/UL — SIGNIFICANT CHANGE UP (ref 3.8–10.5)

## 2020-10-31 PROCEDURE — 97163 PT EVAL HIGH COMPLEX 45 MIN: CPT

## 2020-10-31 PROCEDURE — 83735 ASSAY OF MAGNESIUM: CPT

## 2020-10-31 PROCEDURE — 86769 SARS-COV-2 COVID-19 ANTIBODY: CPT

## 2020-10-31 PROCEDURE — 82570 ASSAY OF URINE CREATININE: CPT

## 2020-10-31 PROCEDURE — 84100 ASSAY OF PHOSPHORUS: CPT

## 2020-10-31 PROCEDURE — 37228: CPT

## 2020-10-31 PROCEDURE — 70450 CT HEAD/BRAIN W/O DYE: CPT

## 2020-10-31 PROCEDURE — C1876: CPT

## 2020-10-31 PROCEDURE — 99152 MOD SED SAME PHYS/QHP 5/>YRS: CPT

## 2020-10-31 PROCEDURE — 86923 COMPATIBILITY TEST ELECTRIC: CPT

## 2020-10-31 PROCEDURE — 81001 URINALYSIS AUTO W/SCOPE: CPT

## 2020-10-31 PROCEDURE — 36415 COLL VENOUS BLD VENIPUNCTURE: CPT

## 2020-10-31 PROCEDURE — C1760: CPT

## 2020-10-31 PROCEDURE — 86900 BLOOD TYPING SEROLOGIC ABO: CPT

## 2020-10-31 PROCEDURE — 99261: CPT

## 2020-10-31 PROCEDURE — 99285 EMERGENCY DEPT VISIT HI MDM: CPT

## 2020-10-31 PROCEDURE — 99233 SBSQ HOSP IP/OBS HIGH 50: CPT

## 2020-10-31 PROCEDURE — 84484 ASSAY OF TROPONIN QUANT: CPT

## 2020-10-31 PROCEDURE — C1874: CPT

## 2020-10-31 PROCEDURE — 87635 SARS-COV-2 COVID-19 AMP PRB: CPT

## 2020-10-31 PROCEDURE — C1894: CPT

## 2020-10-31 PROCEDURE — 84520 ASSAY OF UREA NITROGEN: CPT

## 2020-10-31 PROCEDURE — 75710 ARTERY X-RAYS ARM/LEG: CPT | Mod: XU

## 2020-10-31 PROCEDURE — C1769: CPT

## 2020-10-31 PROCEDURE — 93926 LOWER EXTREMITY STUDY: CPT

## 2020-10-31 PROCEDURE — 85027 COMPLETE CBC AUTOMATED: CPT

## 2020-10-31 PROCEDURE — 71046 X-RAY EXAM CHEST 2 VIEWS: CPT

## 2020-10-31 PROCEDURE — 93005 ELECTROCARDIOGRAM TRACING: CPT

## 2020-10-31 PROCEDURE — 99153 MOD SED SAME PHYS/QHP EA: CPT

## 2020-10-31 PROCEDURE — 80069 RENAL FUNCTION PANEL: CPT

## 2020-10-31 PROCEDURE — 86901 BLOOD TYPING SEROLOGIC RH(D): CPT

## 2020-10-31 PROCEDURE — 33274 TCAT INSJ/RPL PERM LDLS PM: CPT

## 2020-10-31 PROCEDURE — 85610 PROTHROMBIN TIME: CPT

## 2020-10-31 PROCEDURE — C1786: CPT

## 2020-10-31 PROCEDURE — 80053 COMPREHEN METABOLIC PANEL: CPT

## 2020-10-31 PROCEDURE — 72170 X-RAY EXAM OF PELVIS: CPT

## 2020-10-31 PROCEDURE — 97116 GAIT TRAINING THERAPY: CPT

## 2020-10-31 PROCEDURE — 85025 COMPLETE CBC W/AUTO DIFF WBC: CPT

## 2020-10-31 PROCEDURE — C2623: CPT

## 2020-10-31 PROCEDURE — 71045 X-RAY EXAM CHEST 1 VIEW: CPT

## 2020-10-31 PROCEDURE — 84540 ASSAY OF URINE/UREA-N: CPT

## 2020-10-31 PROCEDURE — 82962 GLUCOSE BLOOD TEST: CPT

## 2020-10-31 PROCEDURE — 97530 THERAPEUTIC ACTIVITIES: CPT

## 2020-10-31 PROCEDURE — C1887: CPT

## 2020-10-31 PROCEDURE — 73610 X-RAY EXAM OF ANKLE: CPT

## 2020-10-31 PROCEDURE — 85730 THROMBOPLASTIN TIME PARTIAL: CPT

## 2020-10-31 PROCEDURE — 97110 THERAPEUTIC EXERCISES: CPT

## 2020-10-31 PROCEDURE — 73560 X-RAY EXAM OF KNEE 1 OR 2: CPT

## 2020-10-31 PROCEDURE — 86850 RBC ANTIBODY SCREEN: CPT

## 2020-10-31 PROCEDURE — 37226: CPT

## 2020-10-31 PROCEDURE — 82803 BLOOD GASES ANY COMBINATION: CPT

## 2020-10-31 PROCEDURE — 93010 ELECTROCARDIOGRAM REPORT: CPT

## 2020-10-31 PROCEDURE — 80048 BASIC METABOLIC PNL TOTAL CA: CPT

## 2020-10-31 PROCEDURE — C1725: CPT

## 2020-10-31 RX ORDER — SEVELAMER CARBONATE 2400 MG/1
1 POWDER, FOR SUSPENSION ORAL
Qty: 90 | Refills: 0
Start: 2020-10-31 | End: 2020-11-29

## 2020-10-31 RX ORDER — ERYTHROPOIETIN 10000 [IU]/ML
12000 INJECTION, SOLUTION INTRAVENOUS; SUBCUTANEOUS ONCE
Refills: 0 | Status: DISCONTINUED | OUTPATIENT
Start: 2020-10-31 | End: 2020-10-31

## 2020-10-31 RX ADMIN — TRAMADOL HYDROCHLORIDE 50 MILLIGRAM(S): 50 TABLET ORAL at 01:50

## 2020-10-31 RX ADMIN — CLOPIDOGREL BISULFATE 75 MILLIGRAM(S): 75 TABLET, FILM COATED ORAL at 08:18

## 2020-10-31 RX ADMIN — DULOXETINE HYDROCHLORIDE 60 MILLIGRAM(S): 30 CAPSULE, DELAYED RELEASE ORAL at 08:18

## 2020-10-31 RX ADMIN — Medication 90 MILLIGRAM(S): at 05:21

## 2020-10-31 RX ADMIN — PANTOPRAZOLE SODIUM 40 MILLIGRAM(S): 20 TABLET, DELAYED RELEASE ORAL at 05:21

## 2020-10-31 RX ADMIN — HYDROMORPHONE HYDROCHLORIDE 1 MILLIGRAM(S): 2 INJECTION INTRAMUSCULAR; INTRAVENOUS; SUBCUTANEOUS at 00:38

## 2020-10-31 RX ADMIN — Medication 650 MILLIGRAM(S): at 05:21

## 2020-10-31 RX ADMIN — Medication 50 MILLIGRAM(S): at 05:21

## 2020-10-31 RX ADMIN — Medication 650 MILLIGRAM(S): at 06:21

## 2020-10-31 RX ADMIN — HYDROMORPHONE HYDROCHLORIDE 1 MILLIGRAM(S): 2 INJECTION INTRAMUSCULAR; INTRAVENOUS; SUBCUTANEOUS at 01:48

## 2020-10-31 RX ADMIN — APIXABAN 2.5 MILLIGRAM(S): 2.5 TABLET, FILM COATED ORAL at 11:16

## 2020-10-31 RX ADMIN — ISOSORBIDE MONONITRATE 30 MILLIGRAM(S): 60 TABLET, EXTENDED RELEASE ORAL at 08:18

## 2020-10-31 RX ADMIN — SEVELAMER CARBONATE 800 MILLIGRAM(S): 2400 POWDER, FOR SUSPENSION ORAL at 08:18

## 2020-10-31 NOTE — PROGRESS NOTE ADULT - SUBJECTIVE AND OBJECTIVE BOX
Chief Complaint: ESRD in HD    24 hour events/subjective:  Pt seen/examined this AM;     PAST HISTORY  --------------------------------------------------------------------------------  No significant changes to PMH, PSH, FHx, SHx, unless otherwise noted    ALLERGIES & MEDICATIONS  --------------------------------------------------------------------------------  Allergies    Plavix (Hives)  Toprol-XL (Rash)    Standing Inpatient Medications  apixaban 2.5 milliGRAM(s) Oral every 12 hours  cloNIDine 0.1 milliGRAM(s) Oral at bedtime  clopidogrel Tablet 75 milliGRAM(s) Oral daily  darbepoetin Injectable ViaL 300 MICROGram(s) SubCutaneous every 2 weeks  dextrose 10%. 1000 milliLiter(s) IV Continuous <Continuous>  dextrose 5%. 1000 milliLiter(s) IV Continuous <Continuous>  dextrose 50% Injectable 12.5 Gram(s) IV Push once  dextrose 50% Injectable 25 Gram(s) IV Push once  dextrose 50% Injectable 25 Gram(s) IV Push once  doxazosin 1 milliGRAM(s) Oral at bedtime  DULoxetine 60 milliGRAM(s) Oral daily  epoetin juan-epbx (RETACRIT) Injectable 30875 Unit(s) IV Push <User Schedule>  hydrALAZINE 50 milliGRAM(s) Oral two times a day  insulin lispro (ADMELOG) corrective regimen sliding scale   SubCutaneous three times a day before meals  isosorbide   mononitrate ER Tablet (IMDUR) 30 milliGRAM(s) Oral daily  NIFEdipine XL 60 milliGRAM(s) Oral at bedtime  NIFEdipine XL 90 milliGRAM(s) Oral daily  pantoprazole    Tablet 40 milliGRAM(s) Oral before breakfast  senna 2 Tablet(s) Oral at bedtime  sevelamer carbonate 800 milliGRAM(s) Oral three times a day with meals    PRN Inpatient Medications  acetaminophen   Tablet .. 650 milliGRAM(s) Oral every 6 hours PRN  dextrose 40% Gel 15 Gram(s) Oral once PRN  glucagon  Injectable 1 milliGRAM(s) IntraMuscular once PRN  hydrALAZINE Injectable 10 milliGRAM(s) IV Push every 6 hours PRN  HYDROmorphone  Injectable 0.5 milliGRAM(s) IV Push every 6 hours PRN  HYDROmorphone  Injectable 1 milliGRAM(s) IV Push every 6 hours PRN  ibuprofen  Tablet. 600 milliGRAM(s) Oral every 8 hours PRN  ondansetron Injectable 4 milliGRAM(s) IV Push every 6 hours PRN  traMADol 25 milliGRAM(s) Oral every 6 hours PRN  traMADol 50 milliGRAM(s) Oral every 6 hours PRN    REVIEW OF SYSTEMS  --------------------------------------------------------------------------------  Gen: No weight changes, fatigue, fevers/chills, weakness  Skin: No rashes  Head/Eyes/Ears/Mouth: No headache; Normal hearing; Normal vision w/o blurriness; No sinus pain/discomfort, sore throat  Respiratory: No dyspnea, cough, wheezing, hemoptysis  CV: No chest pain, PND, orthopnea  GI: No abdominal pain, diarrhea, constipation, nausea, vomiting, melena, hematochezia  : No increased frequency, dysuria, hematuria, nocturia  MSK: No joint pain/swelling; no back pain; no edema  Neuro: No dizziness/lightheadedness, weakness, seizures, numbness, tingling  Heme: No easy bruising or bleeding  Endo: No heat/cold intolerance  Psych: No significant nervousness, anxiety, stress, depression    All other systems were reviewed and are negative, except as noted.    VITALS/PHYSICAL EXAM:    ICU Vital Signs Last 24 Hrs,    T(C): 36.4 (31 Oct 2020 05:01), Max: 36.9 (30 Oct 2020 10:02)  T(F): 97.6 (31 Oct 2020 05:01), Max: 98.4 (30 Oct 2020 10:02)  HR: 76 (31 Oct 2020 05:01) (76 - 89)  BP: 129/58 (31 Oct 2020 05:01) (110/57 - 183/67)  RR: 15 (31 Oct 2020 05:01) (15 - 18)  SpO2: 99% (31 Oct 2020 05:) (95% - 99%)  --------------------------------------------------------------------------------  T(C): 36.9 (10-30-20 @ 10:02), Max: 36.9 (10-30-20 @ 04:57)  HR: 85 (10-30-20 @ 10:02) (64 - 97)  BP: 183/67 (10-30-20 @ 10:02) (144/58 - 210/93)  RR: 18 (10-30-20 @ 10:02) (16 - 22)  SpO2: 96% (10-30-20 @ 10:02) (92% - 100%)  Height (cm): 165.1 (10-30-20 @ 01:52)  Weight (kg): 63.8 (10-30-20 @ 01:52)  BMI (kg/m2): 23.4 (10-30-20 @ 01:52)  BSA (m2): 1.7 (10-30-20 @ 01:52)    10-29-20 @ 07:01  -  10-30-20 @ 07:00  --------------------------------------------------------  IN: 120 mL / OUT: 2300 mL / NET: -2180 mL    10-30-20 @ 07:01  -  10-30-20 @ 12:44  --------------------------------------------------------  IN: 240 mL / OUT: 350 mL / NET: -110 mL  137    |  98     |  46.0<H>  ----------------------------<  104<H>  Ca:8.9   (30 Oct 2020 06:03)  4.3     |  21.0<L>  |  4.54<H>    TPro  x      /  Alb  3.2<L>  /  TBili  x      /  DBili  x      /  AST  x      /  ALT  x      /  AlkPhos  x      28 Oct 2020 09:23                        8.2<L>  5.69  )-----------( 224      ( 31 Oct 2020 06:32 )             27.7<L>    Phos:6.0 mg/dL<H> M.1 mg/dL  (10-30 @ 06:03)    Urinalysis Basic - ( 29 Oct 2020 10:04 )  Color: Yellow / Appearance: Clear / S.005<L> / pH: x  Gluc: x / Ketone: Trace<!>  / Bili: Negative / Urobili: Negative mg/dL   Blood: x / Protein: 100 mg/dL<!> / Nitrite: Negative   Leuk Esterase: Negative / RBC: 0-2 /HPF / WBC 3-5   Sq Epi: x / Non Sq Epi: Negative / Bacteria: Negative    Physical Exam:  	Gen: NAD, Pale, ill-appearing  	HEENT: PERRL, supple neck,   	Pulm: CTA B/L  	CV: RRR, S1S2; no rub  	Back: No spinal or CVA tenderness; no sacral edema  	Abd: +BS, soft, nontender/nondistended  	: No suprapubic tenderness  	UE: Warm, FROM, no clubbing, intact strength; no edema; no asterixis  	LE: Warm, FROM, no clubbing, intact strength; no edema  	Neuro: No focal deficits,   	Psych: Normal affect and mood  	Skin: Warm, without rashes  	Vascular access:  BARBRA TONEY,     LABS/STUDIES:  -------------------------------------------------------------------------------              8.6    4.72  >-----------<  213      [10-30-20 @ 06:03]              29.6     137  |  98  |  46.0  ----------------------------<  104      [10-30-20 @ 06:03]  4.3   |  21.0  |  4.54        Ca     8.9     [10-30-20 @ 06:03]      Mg     2.1     [10-30-20 @ 06:03]      Phos  6.0     [10-30-20 @ 06:03]    PT/INR: PT 13.3 , INR 1.15       [10-30-20 @ 06:03]  PTT: 34.9       [10-30-20 @ 06:03]    Troponin 0.12      [10-29-20 @ 06:24]    Creatinine Trend:  SCr 4.54 [10-30 @ 06:03]  SCr 3.69 [10-29 @ 15:32]  SCr 1.53 [10-29 @ 14:20]  SCr 5.06 [10-29 @ 06:24]  SCr 4.98 [10-28 @ 09:23]    Urinalysis - [10-29-20 @ 10:04]      Color Yellow / Appearance Clear / SG 1.005 / pH 6.5      Gluc 100 / Ketone Trace  / Bili Negative / Urobili Negative       Blood Small / Protein 100 / Leuk Est Negative / Nitrite Negative      RBC 0-2 / WBC 3-5 / Hyaline  / Gran  / Sq Epi  / Non Sq Epi Negative / Bacteria Negative    Iron 53, TIBC 266, %sat 20      [20 @ 06:32]  Ferritin 184      [20 06:32]  PTH -- (Ca 9.6)      [20 @ 16:08]   91  Vitamin D (25OH) 26.4      [20 @ 16:08]  HbA1c 4.9      [18 @ 05:54]  TSH 2.16      [09-15-20 @ 02:01]    1) ESRD on HD  2) MBD of renal dx  3) Anemia of renal dx  4) Vol HTN    On retacrit 12k units;   continue with antihypertensives

## 2020-10-31 NOTE — PROGRESS NOTE ADULT - ASSESSMENT
86M w/ CLI s/p RLE angiogram with angioplasty June 2020 initially presenting w/persistent RLE rest pain, now s/p RLE angio/SFA angioplasty/peroneal angioplasty and pop stents x 3 on 10/22    -Cont to have RLE foot pain. Arterial duplex noted as above  -Eliquis resumed following PPM  -cont Plavix  -HD per renal  -PT eval

## 2020-10-31 NOTE — PROGRESS NOTE ADULT - SUBJECTIVE AND OBJECTIVE BOX
HPI/OVERNIGHT EVENTS: No acute events overnight. Hemodynamically stable overnight. Arterial duplex obtained for continued RLE pain. Otherwise, patient has no new complaints.    Vital Signs Last 24 Hrs  T(C): 36.4 (30 Oct 2020 21:12), Max: 36.9 (30 Oct 2020 04:57)  T(F): 97.5 (30 Oct 2020 21:12), Max: 98.4 (30 Oct 2020 04:57)  HR: 85 (31 Oct 2020 00:35) (78 - 89)  BP: 129/51 (31 Oct 2020 00:35) (110/57 - 195/61)  BP(mean): --  RR: 16 (30 Oct 2020 21:12) (16 - 18)  SpO2: 95% (30 Oct 2020 21:12) (95% - 99%)    I&O's Detail    29 Oct 2020 07:01  -  30 Oct 2020 07:00  --------------------------------------------------------  IN:    Oral Fluid: 120 mL  Total IN: 120 mL    OUT:    Intermittent Catheterization - Urethral (mL): 600 mL    Other (mL): 1500 mL    Voided (mL): 200 mL  Total OUT: 2300 mL    Total NET: -2180 mL      30 Oct 2020 07:01  -  31 Oct 2020 03:52  --------------------------------------------------------  IN:    Oral Fluid: 480 mL  Total IN: 480 mL    OUT:    Voided (mL): 575 mL  Total OUT: 575 mL    Total NET: -95 mL    Physical exam:   General: A&Ox3, NAD.   Abdomen: Soft, NT,ND  Ext: LUE AVF w improved ecchymosis and palp thrill. Monophasic signal of RLE AT, dependent rubor of RLE w no tissue loss  R groin access site suture removed; no hematoma or ecchymosis    LABS:                        8.6    4.72  )-----------( 213      ( 30 Oct 2020 06:03 )             29.6     10-30    137  |  98  |  46.0<H>  ----------------------------<  104<H>  4.3   |  21.0<L>  |  4.54<H>    Ca    8.9      30 Oct 2020 06:03  Phos  6.0     10-30  Mg     2.1     10-30      PT/INR - ( 30 Oct 2020 06:03 )   PT: 13.3 sec;   INR: 1.15 ratio         PTT - ( 30 Oct 2020 06:03 )  PTT:34.9 sec  Urinalysis Basic - ( 29 Oct 2020 10:04 )    Color: Yellow / Appearance: Clear / S.005 / pH: x  Gluc: x / Ketone: Trace  / Bili: Negative / Urobili: Negative mg/dL   Blood: x / Protein: 100 mg/dL / Nitrite: Negative   Leuk Esterase: Negative / RBC: 0-2 /HPF / WBC 3-5   Sq Epi: x / Non Sq Epi: Negative / Bacteria: Negative        MEDICATIONS  (STANDING):  apixaban 2.5 milliGRAM(s) Oral every 12 hours  cloNIDine 0.1 milliGRAM(s) Oral at bedtime  clopidogrel Tablet 75 milliGRAM(s) Oral daily  darbepoetin Injectable ViaL 300 MICROGram(s) SubCutaneous every 2 weeks  dextrose 10%. 1000 milliLiter(s) (30 mL/Hr) IV Continuous <Continuous>  dextrose 5%. 1000 milliLiter(s) (50 mL/Hr) IV Continuous <Continuous>  dextrose 50% Injectable 25 Gram(s) IV Push once  dextrose 50% Injectable 25 Gram(s) IV Push once  dextrose 50% Injectable 12.5 Gram(s) IV Push once  doxazosin 1 milliGRAM(s) Oral at bedtime  DULoxetine 60 milliGRAM(s) Oral daily  epoetin juan-epbx (RETACRIT) Injectable 49505 Unit(s) IV Push <User Schedule>  hydrALAZINE 50 milliGRAM(s) Oral two times a day  insulin lispro (ADMELOG) corrective regimen sliding scale   SubCutaneous three times a day before meals  isosorbide   mononitrate ER Tablet (IMDUR) 30 milliGRAM(s) Oral daily  NIFEdipine XL 60 milliGRAM(s) Oral at bedtime  NIFEdipine XL 90 milliGRAM(s) Oral daily  pantoprazole    Tablet 40 milliGRAM(s) Oral before breakfast  senna 2 Tablet(s) Oral at bedtime  sevelamer carbonate 800 milliGRAM(s) Oral three times a day with meals    MEDICATIONS  (PRN):  acetaminophen   Tablet .. 650 milliGRAM(s) Oral every 6 hours PRN Mild Pain (1 - 3)  dextrose 40% Gel 15 Gram(s) Oral once PRN Blood Glucose LESS THAN 70 milliGRAM(s)/deciliter  glucagon  Injectable 1 milliGRAM(s) IntraMuscular once PRN Glucose LESS THAN 70 milligrams/deciliter  hydrALAZINE Injectable 10 milliGRAM(s) IV Push every 6 hours PRN SBP >180  HYDROmorphone  Injectable 0.5 milliGRAM(s) IV Push every 6 hours PRN Moderate Pain (4 - 6)  HYDROmorphone  Injectable 1 milliGRAM(s) IV Push every 6 hours PRN Severe Pain (7 - 10)  ibuprofen  Tablet. 600 milliGRAM(s) Oral every 8 hours PRN Moderate Pain (4 - 6)  ondansetron Injectable 4 milliGRAM(s) IV Push every 6 hours PRN Nausea  traMADol 25 milliGRAM(s) Oral every 6 hours PRN Moderate Pain (4 - 6)  traMADol 50 milliGRAM(s) Oral every 6 hours PRN Severe Pain (7 - 10)

## 2020-10-31 NOTE — PROGRESS NOTE ADULT - ASSESSMENT
> 85 y/o male with PMHx of HTN, HLD, CAD, HFpEF, s/p Right CEA for Carotid artery disease, ESRD on HD 2d/week via LUE fistula, s/p flecainide w/ ILR placed 6/2020, AS s/p TAVR, PAD initially presented 10/21 , s/p RLE s/p angioplasty with 3 stents placed to the rt SFA, peroneal and popliteal artery.     Early am 10 /29/20 pt went into [facility] w/ multiple pauses up to 9-12 seconds, initially had TVP in icu and now s/p leadless ppm.     - Complete Heart block, s/p PPM placement, on  aspirin and Plavix , Eliquis     - PAD s/p angioplasty and stent x3    - ESRD on dialysis,     - Hypophosphatemia:     - Anemia, very likely of chronic disease and multifactorial, Hb 9 gms.,     Cleared for discharge,  HD on Monday,     EPO Sc X 1,

## 2020-11-03 ENCOUNTER — APPOINTMENT (OUTPATIENT)
Dept: HEMATOLOGY ONCOLOGY | Facility: CLINIC | Age: 85
End: 2020-11-03
Payer: MEDICARE

## 2020-11-03 ENCOUNTER — RESULT REVIEW (OUTPATIENT)
Age: 85
End: 2020-11-03

## 2020-11-03 ENCOUNTER — OUTPATIENT (OUTPATIENT)
Dept: OUTPATIENT SERVICES | Facility: HOSPITAL | Age: 85
LOS: 1 days | End: 2020-11-03
Payer: MEDICARE

## 2020-11-03 ENCOUNTER — APPOINTMENT (OUTPATIENT)
Dept: HEMATOLOGY ONCOLOGY | Facility: CLINIC | Age: 85
End: 2020-11-03

## 2020-11-03 VITALS
OXYGEN SATURATION: 95 % | SYSTOLIC BLOOD PRESSURE: 162 MMHG | HEIGHT: 65 IN | DIASTOLIC BLOOD PRESSURE: 57 MMHG | BODY MASS INDEX: 24.99 KG/M2 | TEMPERATURE: 97.6 F | WEIGHT: 150 LBS | HEART RATE: 71 BPM

## 2020-11-03 DIAGNOSIS — I77.0 ARTERIOVENOUS FISTULA, ACQUIRED: Chronic | ICD-10-CM

## 2020-11-03 DIAGNOSIS — Z98.890 OTHER SPECIFIED POSTPROCEDURAL STATES: Chronic | ICD-10-CM

## 2020-11-03 DIAGNOSIS — Z95.2 PRESENCE OF PROSTHETIC HEART VALVE: Chronic | ICD-10-CM

## 2020-11-03 DIAGNOSIS — Z98.62 PERIPHERAL VASCULAR ANGIOPLASTY STATUS: Chronic | ICD-10-CM

## 2020-11-03 DIAGNOSIS — D63.1 ANEMIA IN CHRONIC KIDNEY DISEASE: ICD-10-CM

## 2020-11-03 LAB
BASOPHILS # BLD AUTO: 0.1 K/UL — SIGNIFICANT CHANGE UP (ref 0–0.2)
BASOPHILS NFR BLD AUTO: 1.3 % — SIGNIFICANT CHANGE UP (ref 0–2)
BLD GP AB SCN SERPL QL: SIGNIFICANT CHANGE UP
EOSINOPHIL # BLD AUTO: 0.2 K/UL — SIGNIFICANT CHANGE UP (ref 0–0.5)
EOSINOPHIL NFR BLD AUTO: 3 % — SIGNIFICANT CHANGE UP (ref 0–6)
HCT VFR BLD CALC: 28 % — LOW (ref 39–50)
HGB BLD-MCNC: 8.5 G/DL — LOW (ref 13–17)
LYMPHOCYTES # BLD AUTO: 0.5 K/UL — LOW (ref 1–3.3)
LYMPHOCYTES # BLD AUTO: 7.5 % — LOW (ref 13–44)
MCHC RBC-ENTMCNC: 27.4 PG — SIGNIFICANT CHANGE UP (ref 27–34)
MCHC RBC-ENTMCNC: 30.4 G/DL — LOW (ref 32–36)
MCV RBC AUTO: 90.3 FL — SIGNIFICANT CHANGE UP (ref 80–100)
MONOCYTES # BLD AUTO: 0.6 K/UL — SIGNIFICANT CHANGE UP (ref 0–0.9)
MONOCYTES NFR BLD AUTO: 8.7 % — SIGNIFICANT CHANGE UP (ref 2–14)
NEUTROPHILS # BLD AUTO: 5.2 K/UL — SIGNIFICANT CHANGE UP (ref 1.8–7.4)
NEUTROPHILS NFR BLD AUTO: 79.5 % — HIGH (ref 43–77)
PLATELET # BLD AUTO: 294 K/UL — SIGNIFICANT CHANGE UP (ref 150–400)
RBC # BLD: 3.11 M/UL — LOW (ref 4.2–5.8)
RBC # FLD: 15.8 % — HIGH (ref 10.3–14.5)
WBC # BLD: 6.5 K/UL — SIGNIFICANT CHANGE UP (ref 3.8–10.5)
WBC # FLD AUTO: 6.5 K/UL — SIGNIFICANT CHANGE UP (ref 3.8–10.5)

## 2020-11-03 PROCEDURE — 99072 ADDL SUPL MATRL&STAF TM PHE: CPT

## 2020-11-03 PROCEDURE — 99213 OFFICE O/P EST LOW 20 MIN: CPT

## 2020-11-03 NOTE — HISTORY OF PRESENT ILLNESS
[de-identified] : Mr. King is an 87 yo WM with a long history of renal, disease, currently Stage 5, who had been treated conservatively, on procrit/aranesp at [facility], but recently initiated dialysis. Following a recent TAVR for worsening AS on August 21 he has required several transfusions to maintain hemoglobin of 9 g. No overt bleeding sites have been identified. [de-identified] : Recently hospitalized for angioplasty of RLE, and required pacemeker to be placed. he was transfused while in the hospital.He is now on dialysis, and receiving epogen weekly. with dialysis. He also receives aranesp at [facility]. HGB at discharge was 8  gms.He does have fatigue, occasional nose bleeds and had noticed 1 episode of bleeding a tip of penis. According to daughter his GI work up was negative for active bleeding.Here today to check CBC and to make arrangements for transfusion if needed.

## 2020-11-03 NOTE — ASSESSMENT
[FreeTextEntry1] : Anemia in stage 4 - 5 chronic kidney disease., requiring transfusions, is on dialysis and receiving Epogen with dialysis and Aranesp at [facility]. recently discharged from [facility] , required RLE angioplasty and pacemaker to be placed. His HGb today is 8.5 gms. he is still symptomatic , and will transfuse 1 unit of Packed cells on 11-5-20. Will discuss use of both epogen and aranesp with Dr. Baker, He just received epogen yesterday.Will follow CBC weekly.

## 2020-11-03 NOTE — PHYSICAL EXAM
[Ambulatory and capable of all self care but unable to carry out any work activities] : Status 2- Ambulatory and capable of all self care but unable to carry out any work activities. Up and about more than 50% of waking hours [Thin] : thin [Normal] : no peripheral adenopathy appreciated

## 2020-11-05 ENCOUNTER — APPOINTMENT (OUTPATIENT)
Dept: HEMATOLOGY ONCOLOGY | Facility: CLINIC | Age: 85
End: 2020-11-05

## 2020-11-05 ENCOUNTER — APPOINTMENT (OUTPATIENT)
Age: 85
End: 2020-11-05

## 2020-11-05 PROCEDURE — 86901 BLOOD TYPING SEROLOGIC RH(D): CPT

## 2020-11-05 PROCEDURE — 36415 COLL VENOUS BLD VENIPUNCTURE: CPT

## 2020-11-05 PROCEDURE — 86923 COMPATIBILITY TEST ELECTRIC: CPT

## 2020-11-05 PROCEDURE — P9040: CPT

## 2020-11-05 PROCEDURE — 86900 BLOOD TYPING SEROLOGIC ABO: CPT

## 2020-11-05 PROCEDURE — 86850 RBC ANTIBODY SCREEN: CPT

## 2020-11-09 DIAGNOSIS — N18.5 CHRONIC KIDNEY DISEASE, STAGE 5: ICD-10-CM

## 2020-11-09 DIAGNOSIS — R11.2 NAUSEA WITH VOMITING, UNSPECIFIED: ICD-10-CM

## 2020-11-09 DIAGNOSIS — Z51.89 ENCOUNTER FOR OTHER SPECIFIED AFTERCARE: ICD-10-CM

## 2020-11-10 ENCOUNTER — APPOINTMENT (OUTPATIENT)
Dept: VASCULAR SURGERY | Facility: CLINIC | Age: 85
End: 2020-11-10
Payer: MEDICARE

## 2020-11-10 VITALS
TEMPERATURE: 97.2 F | BODY MASS INDEX: 24.66 KG/M2 | RESPIRATION RATE: 14 BRPM | DIASTOLIC BLOOD PRESSURE: 61 MMHG | SYSTOLIC BLOOD PRESSURE: 160 MMHG | HEIGHT: 65 IN | HEART RATE: 78 BPM | WEIGHT: 148.03 LBS | OXYGEN SATURATION: 98 %

## 2020-11-10 DIAGNOSIS — Z98.890 OTHER SPECIFIED POSTPROCEDURAL STATES: ICD-10-CM

## 2020-11-10 DIAGNOSIS — M79.674 PAIN IN RIGHT TOE(S): ICD-10-CM

## 2020-11-10 PROCEDURE — 99024 POSTOP FOLLOW-UP VISIT: CPT

## 2020-11-10 PROCEDURE — 93926 LOWER EXTREMITY STUDY: CPT

## 2020-11-10 NOTE — VITALS
[de-identified] : 10/10 [FreeTextEntry3] : toes on right foot [FreeTextEntry1] : pain medication [FreeTextEntry2] : touching or moving his toes.

## 2020-11-10 NOTE — PROCEDURE
[FreeTextEntry1] : U/S Arterial Duplex RLE 11/10/20 demonstrates patent popliteal artery stents with no evidence of flow in mid to distal PTA, with flow reconstituting in PT distally. There is calcification noted throughout the right lower extremity.

## 2020-11-10 NOTE — PHYSICAL EXAM
[2+] : right 2+ [de-identified] : WD, WN, NAD. Awake, alert, interactive. Ambulates without difficulty [de-identified] : ELIJAH, PERPIEROL [de-identified] : supple [de-identified] : non-labored [FreeTextEntry1] : forefoot and toes RLE tender to touch, increased vascularity.\par Eschar on heel and dorsal aspect of second toe.\par Good palpable pulses.

## 2020-11-10 NOTE — HISTORY OF PRESENT ILLNESS
[FreeTextEntry1] : 85 y/o male s/p RLE SFA and peroneal artery angioplasty with stent x 3 in right popliteal artery 10/22/20, s/p LUE AVF 5/4/18 and s/p R CEA 10/10/17. He was found to have small vessel disease in right foot that will be managed medically. me by his daughter. He is accompanied by his daughter. He states he has been taking tramadol at that time and he feels that while it helps he wakens during the night with some pain. He states there is redness and pain to his toes that can be 10/10 stabbing and sharp. He has a small wound to his heel and the top of his second toe. There are areas with eschar on his right calf. He keeps his leg elevated but not above the level of the heart. No fever or chills. He is a nonsmoker.\par \par He is taking Torsemide, Atorvastatin, Eliquis 5 mg BID and Cymbalta.

## 2020-11-10 NOTE — ASSESSMENT
[FreeTextEntry1] : 87 y/o male s/p RLE SFA and peroneal artery angioplasty with stent x 3 in right popliteal artery 10/22/20, s/p LUE AVF 5/4/18 and s/p R CEA 10/10/17. U/S Arterial duplex  demonstrates good flow. Symptoms are likely from reperfusion of right foot and toes. Gabapentin has been utilized in the past but caused significant symptoms. He will continue taking Tramadol for pain, walk daily for exercise and elevate leg at rest. \par \par Plan:\par Continue taking Atorvastatin, Eliquis 5 mg BID and Cymbalta.\par Continue to take tramadol for pain.\par Walk daily for exercise to improve collateral circulation.\par Elevate legs at rest.\par Ultrasound completed in the office today.\par RTC in 2 weeks.

## 2020-11-10 NOTE — REASON FOR VISIT
[Procedure: _________] : a [unfilled] procedure visit [FreeTextEntry1] : s/p RLE SFA and peroneal artery angioplasty with stent x 3 10/22/20.

## 2020-11-12 ENCOUNTER — OUTPATIENT (OUTPATIENT)
Dept: OUTPATIENT SERVICES | Facility: HOSPITAL | Age: 85
LOS: 1 days | End: 2020-11-12

## 2020-11-12 ENCOUNTER — RESULT REVIEW (OUTPATIENT)
Age: 85
End: 2020-11-12

## 2020-11-12 ENCOUNTER — APPOINTMENT (OUTPATIENT)
Dept: HEMATOLOGY ONCOLOGY | Facility: CLINIC | Age: 85
End: 2020-11-12

## 2020-11-12 ENCOUNTER — INPATIENT (INPATIENT)
Facility: HOSPITAL | Age: 85
LOS: 1 days | Discharge: HOME | DRG: 377 | End: 2020-11-14
Attending: INTERNAL MEDICINE | Admitting: HOSPITALIST
Payer: MEDICARE

## 2020-11-12 ENCOUNTER — APPOINTMENT (OUTPATIENT)
Age: 85
End: 2020-11-12

## 2020-11-12 VITALS
HEART RATE: 78 BPM | RESPIRATION RATE: 18 BRPM | WEIGHT: 147.05 LBS | DIASTOLIC BLOOD PRESSURE: 52 MMHG | SYSTOLIC BLOOD PRESSURE: 147 MMHG | HEIGHT: 65 IN | TEMPERATURE: 98 F | OXYGEN SATURATION: 99 %

## 2020-11-12 DIAGNOSIS — Z95.2 PRESENCE OF PROSTHETIC HEART VALVE: Chronic | ICD-10-CM

## 2020-11-12 DIAGNOSIS — Z98.62 PERIPHERAL VASCULAR ANGIOPLASTY STATUS: Chronic | ICD-10-CM

## 2020-11-12 DIAGNOSIS — I77.0 ARTERIOVENOUS FISTULA, ACQUIRED: Chronic | ICD-10-CM

## 2020-11-12 DIAGNOSIS — Z98.890 OTHER SPECIFIED POSTPROCEDURAL STATES: Chronic | ICD-10-CM

## 2020-11-12 DIAGNOSIS — D63.1 ANEMIA IN CHRONIC KIDNEY DISEASE: ICD-10-CM

## 2020-11-12 DIAGNOSIS — K92.2 GASTROINTESTINAL HEMORRHAGE, UNSPECIFIED: ICD-10-CM

## 2020-11-12 DIAGNOSIS — D64.9 ANEMIA, UNSPECIFIED: ICD-10-CM

## 2020-11-12 LAB
ALBUMIN SERPL ELPH-MCNC: 3.1 G/DL — LOW (ref 3.3–5.2)
ALP SERPL-CCNC: 87 U/L — SIGNIFICANT CHANGE UP (ref 40–120)
ALT FLD-CCNC: 10 U/L — SIGNIFICANT CHANGE UP
ANION GAP SERPL CALC-SCNC: 16 MMOL/L — SIGNIFICANT CHANGE UP (ref 5–17)
APTT BLD: 34.4 SEC — SIGNIFICANT CHANGE UP (ref 27.5–35.5)
AST SERPL-CCNC: 18 U/L — SIGNIFICANT CHANGE UP
BASOPHILS # BLD AUTO: 0.1 K/UL — SIGNIFICANT CHANGE UP (ref 0–0.2)
BASOPHILS NFR BLD AUTO: 1.4 % — SIGNIFICANT CHANGE UP (ref 0–2)
BILIRUB SERPL-MCNC: 0.2 MG/DL — LOW (ref 0.4–2)
BLD GP AB SCN SERPL QL: SIGNIFICANT CHANGE UP
BUN SERPL-MCNC: 112 MG/DL — HIGH (ref 8–20)
CALCIUM SERPL-MCNC: 8.8 MG/DL — SIGNIFICANT CHANGE UP (ref 8.6–10.2)
CHLORIDE SERPL-SCNC: 98 MMOL/L — SIGNIFICANT CHANGE UP (ref 98–107)
CO2 SERPL-SCNC: 22 MMOL/L — SIGNIFICANT CHANGE UP (ref 22–29)
CREAT SERPL-MCNC: 5.16 MG/DL — HIGH (ref 0.5–1.3)
EOSINOPHIL # BLD AUTO: 0.4 K/UL — SIGNIFICANT CHANGE UP (ref 0–0.5)
EOSINOPHIL NFR BLD AUTO: 4.4 % — SIGNIFICANT CHANGE UP (ref 0–6)
GLUCOSE SERPL-MCNC: 154 MG/DL — HIGH (ref 70–99)
HCT VFR BLD CALC: 19.6 % — CRITICAL LOW (ref 39–50)
HCT VFR BLD CALC: 20.6 % — CRITICAL LOW (ref 39–50)
HGB BLD-MCNC: 5.7 G/DL — CRITICAL LOW (ref 13–17)
HGB BLD-MCNC: 5.8 G/DL — CRITICAL LOW (ref 13–17)
INR BLD: 1.33 RATIO — HIGH (ref 0.88–1.16)
LYMPHOCYTES # BLD AUTO: 1 K/UL — SIGNIFICANT CHANGE UP (ref 1–3.3)
LYMPHOCYTES # BLD AUTO: 13.1 % — SIGNIFICANT CHANGE UP (ref 13–44)
MCHC RBC-ENTMCNC: 26.4 PG — LOW (ref 27–34)
MCHC RBC-ENTMCNC: 28.1 PG — SIGNIFICANT CHANGE UP (ref 27–34)
MCHC RBC-ENTMCNC: 28.2 G/DL — LOW (ref 32–36)
MCHC RBC-ENTMCNC: 29.1 GM/DL — LOW (ref 32–36)
MCV RBC AUTO: 93.6 FL — SIGNIFICANT CHANGE UP (ref 80–100)
MCV RBC AUTO: 96.6 FL — SIGNIFICANT CHANGE UP (ref 80–100)
MONOCYTES # BLD AUTO: 0.7 K/UL — SIGNIFICANT CHANGE UP (ref 0–0.9)
MONOCYTES NFR BLD AUTO: 9.4 % — SIGNIFICANT CHANGE UP (ref 2–14)
NEUTROPHILS # BLD AUTO: 5.7 K/UL — SIGNIFICANT CHANGE UP (ref 1.8–7.4)
NEUTROPHILS NFR BLD AUTO: 71.7 % — SIGNIFICANT CHANGE UP (ref 43–77)
OB PNL STL: POSITIVE
PLATELET # BLD AUTO: 243 K/UL — SIGNIFICANT CHANGE UP (ref 150–400)
PLATELET # BLD AUTO: 258 K/UL — SIGNIFICANT CHANGE UP (ref 150–400)
POTASSIUM SERPL-MCNC: 5.1 MMOL/L — SIGNIFICANT CHANGE UP (ref 3.5–5.3)
POTASSIUM SERPL-SCNC: 5.1 MMOL/L — SIGNIFICANT CHANGE UP (ref 3.5–5.3)
PROT SERPL-MCNC: 5.4 G/DL — LOW (ref 6.6–8.7)
PROTHROM AB SERPL-ACNC: 15.2 SEC — HIGH (ref 10.6–13.6)
RAPID RVP RESULT: SIGNIFICANT CHANGE UP
RAPID RVP RESULT: SIGNIFICANT CHANGE UP
RBC # BLD: 2.03 M/UL — LOW (ref 4.2–5.8)
RBC # BLD: 2.21 M/UL — LOW (ref 4.2–5.8)
RBC # FLD: 19.4 % — HIGH (ref 10.3–14.5)
RBC # FLD: 19.7 % — HIGH (ref 10.3–14.5)
SARS-COV-2 RNA SPEC QL NAA+PROBE: SIGNIFICANT CHANGE UP
SARS-COV-2 RNA SPEC QL NAA+PROBE: SIGNIFICANT CHANGE UP
SODIUM SERPL-SCNC: 136 MMOL/L — SIGNIFICANT CHANGE UP (ref 135–145)
WBC # BLD: 7.9 K/UL — SIGNIFICANT CHANGE UP (ref 3.8–10.5)
WBC # BLD: 9.45 K/UL — SIGNIFICANT CHANGE UP (ref 3.8–10.5)
WBC # FLD AUTO: 7.9 K/UL — SIGNIFICANT CHANGE UP (ref 3.8–10.5)
WBC # FLD AUTO: 9.45 K/UL — SIGNIFICANT CHANGE UP (ref 3.8–10.5)

## 2020-11-12 PROCEDURE — 99223 1ST HOSP IP/OBS HIGH 75: CPT

## 2020-11-12 PROCEDURE — 71045 X-RAY EXAM CHEST 1 VIEW: CPT | Mod: 26

## 2020-11-12 PROCEDURE — 99285 EMERGENCY DEPT VISIT HI MDM: CPT

## 2020-11-12 PROCEDURE — 93010 ELECTROCARDIOGRAM REPORT: CPT

## 2020-11-12 RX ORDER — ACETAMINOPHEN 500 MG
650 TABLET ORAL EVERY 6 HOURS
Refills: 0 | Status: DISCONTINUED | OUTPATIENT
Start: 2020-11-12 | End: 2020-11-14

## 2020-11-12 RX ORDER — FUROSEMIDE 40 MG
40 TABLET ORAL ONCE
Refills: 0 | Status: COMPLETED | OUTPATIENT
Start: 2020-11-12 | End: 2020-11-12

## 2020-11-12 RX ORDER — NIFEDIPINE 30 MG
90 TABLET, EXTENDED RELEASE 24 HR ORAL DAILY
Refills: 0 | Status: DISCONTINUED | OUTPATIENT
Start: 2020-11-12 | End: 2020-11-14

## 2020-11-12 RX ORDER — ACETAMINOPHEN 500 MG
650 TABLET ORAL ONCE
Refills: 0 | Status: COMPLETED | OUTPATIENT
Start: 2020-11-12 | End: 2020-11-12

## 2020-11-12 RX ORDER — DOXAZOSIN MESYLATE 4 MG
1 TABLET ORAL AT BEDTIME
Refills: 0 | Status: DISCONTINUED | OUTPATIENT
Start: 2020-11-12 | End: 2020-11-14

## 2020-11-12 RX ORDER — PANTOPRAZOLE SODIUM 20 MG/1
40 TABLET, DELAYED RELEASE ORAL
Refills: 0 | Status: DISCONTINUED | OUTPATIENT
Start: 2020-11-12 | End: 2020-11-12

## 2020-11-12 RX ORDER — NIFEDIPINE 30 MG
60 TABLET, EXTENDED RELEASE 24 HR ORAL AT BEDTIME
Refills: 0 | Status: DISCONTINUED | OUTPATIENT
Start: 2020-11-12 | End: 2020-11-14

## 2020-11-12 RX ORDER — SEVELAMER CARBONATE 2400 MG/1
800 POWDER, FOR SUSPENSION ORAL
Refills: 0 | Status: DISCONTINUED | OUTPATIENT
Start: 2020-11-12 | End: 2020-11-14

## 2020-11-12 RX ORDER — TRAMADOL HYDROCHLORIDE 50 MG/1
25 TABLET ORAL EVERY 6 HOURS
Refills: 0 | Status: DISCONTINUED | OUTPATIENT
Start: 2020-11-12 | End: 2020-11-14

## 2020-11-12 RX ORDER — PANTOPRAZOLE SODIUM 20 MG/1
40 TABLET, DELAYED RELEASE ORAL DAILY
Refills: 0 | Status: DISCONTINUED | OUTPATIENT
Start: 2020-11-12 | End: 2020-11-13

## 2020-11-12 RX ORDER — ISOSORBIDE MONONITRATE 60 MG/1
30 TABLET, EXTENDED RELEASE ORAL DAILY
Refills: 0 | Status: DISCONTINUED | OUTPATIENT
Start: 2020-11-12 | End: 2020-11-14

## 2020-11-12 RX ORDER — HYDRALAZINE HCL 50 MG
50 TABLET ORAL
Refills: 0 | Status: DISCONTINUED | OUTPATIENT
Start: 2020-11-12 | End: 2020-11-14

## 2020-11-12 RX ORDER — ATORVASTATIN CALCIUM 80 MG/1
80 TABLET, FILM COATED ORAL AT BEDTIME
Refills: 0 | Status: DISCONTINUED | OUTPATIENT
Start: 2020-11-12 | End: 2020-11-14

## 2020-11-12 RX ORDER — DULOXETINE HYDROCHLORIDE 30 MG/1
60 CAPSULE, DELAYED RELEASE ORAL DAILY
Refills: 0 | Status: DISCONTINUED | OUTPATIENT
Start: 2020-11-12 | End: 2020-11-14

## 2020-11-12 RX ADMIN — Medication 40 MILLIGRAM(S): at 21:35

## 2020-11-12 RX ADMIN — Medication 650 MILLIGRAM(S): at 18:05

## 2020-11-12 RX ADMIN — TRAMADOL HYDROCHLORIDE 25 MILLIGRAM(S): 50 TABLET ORAL at 20:37

## 2020-11-12 RX ADMIN — Medication 650 MILLIGRAM(S): at 17:12

## 2020-11-12 NOTE — ED PROVIDER NOTE - PHYSICAL EXAMINATION
General: NAD, well appearing  HEENT: Normocephalic, EOM intact  Neck: No apparent stiffness or JVD  Pulm: Chest wall symmetric and nontender, lungs clear to ascultation   Cardiac: Regular rate and regular rhythm  Abdomen: Nontender and nondistended  Skin: Skin in warm, dry and intact without rashes or lesions. Some mild skin sloughing between toes of RLE  Neuro: No apparent motor or sensory deficits  Psych: Alert, oriented, and cooperative

## 2020-11-12 NOTE — ED PROVIDER NOTE - CARE PLAN
Principal Discharge DX:	Anemia  Secondary Diagnosis:	GI bleeding  Secondary Diagnosis:	ESRD on dialysis

## 2020-11-12 NOTE — H&P ADULT - ASSESSMENT
85 y/o male with PMHx of HTN, HLD, CAD, HFpEF, s/p Right CEA for Carotid artery disease, CKD5 on HD 2d/week via LUE fistula, s/p flecainide w/ ILR placed 6/2020, AS s/p TAVR, PAD initially presented 10/21 , s/p RLE s/p angioplasty with 3 stents placed to the rt SFA, peroneal and popliteal artery. Early am 10 /29/20 pt went into [facility] w/ multiple pauses up to 9-12 seconds, initially had TVP in icu and now s/p leadless ppm.       Symptomatic anemia: Typed and screened, will transfuse 2 units, will give lasix inbetween, follows with Dr Baker, had workup done in the past and has been having recurrent transfusion and multiple Endoscopies and colonoscopies, GI has seen him, will follow, his occult blood is positive, might need to have capsular endoscopy as out patient.         - Complete Heart block, s/p PPM placement recently, will call EP in am for the Interrogation.     - PAD s/p angioplasty and stent x3, vascualr team following, holding palvix and Eliquis, will resume once ok from GI    - ESRD on dialysis, followed by nephrology team, dialysis session tomorrow, ER called Dr Mcnulty     DVT prophylaxis: SCDs    Dispo: likely tomorrow after HD and eval by EP and repeat CBC. 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 Early am 10 /29/20 pt went into [facility] w/ multiple pauses up to 9-12 seconds, initially had TVP in icu and  s/p leadless ppm, brought in here because of low blood count.       Symptomatic anemia: Typed and screened, will transfuse 2 units, will give lasix inbetween, follows with Dr Baker, had workup done in the past and has been having recurrent transfusion and multiple Endoscopies and colonoscopies, GI has seen him, will follow, his occult blood is positive, might need to have capsular endoscopy as out patient.         - Complete Heart block, s/p PPM placement recently, will call EP in am for the Interrogation.     - PAD s/p angioplasty and stent x3, vascualr team following, holding palvix and Eliquis, will resume once ok from GI    - ESRD on dialysis, followed by nephrology team, dialysis session tomorrow, ER called Dr Mcnulty     DVT prophylaxis: SCDs    Dispo: likely tomorrow after HD and eval by EP and repeat CBC.

## 2020-11-12 NOTE — ED PROVIDER NOTE - CLINICAL SUMMARY MEDICAL DECISION MAKING FREE TEXT BOX
85 y/o male with PMHx of HTN, HLD, CAD, HFpEF, s/p Right CEA for Carotid artery disease, ESRD on HD 2d/week via LUE fistula, s/p flecainide w/ ILR placed 6/2020, AS s/p TAVR, PAD with RLE revasc on 10/2020, on Eliquis and Plavix, anemia requiring prior transfusions, presents to ED with daughter (who works here as a nurse), due to anemia. Will get labs, EKG and provide blood. Consulting nephrology, GI, Cardiology, hematology.

## 2020-11-12 NOTE — CONSULT NOTE ADULT - SUBJECTIVE AND OBJECTIVE BOX
Patient is a 86y old  Male who presents with a chief complaint of Low blood count (12 Nov 2020 17:42)      HPI:  87 y/o male with PMHx of HTN, HLD, CAD, HFpEF, s/p Right CEA for Carotid artery disease, ESRD on HD 2d/week via LUE fistula, s/p flecainide w/ ILR placed 6/2020, AS s/p TAVR, PAD with RLE revasc on 10/2020, on Eliquis and Plavix, anemia requiring prior transfusions, presents to ED with daughter (who works here as a nurse manger), due to anemia.  Patient has had a few days of dark stools. He also endorses RLE pain that has been present for some time.  Daughter says it has improved significantly since revascularization.  Otherwise denies pain, bleeding, SOB, chest pain, abdominal pain or fevers.  Denies other pertinent medical problems.  Denies any overt substance use. (12 Nov 2020 17:42). He is s/p a recent PPM placed in October 2020 and RLE re-vascularization with RLE stent placement 2 weeks ago and was started on Eliquis 2.5 mg. BID and Plavix 75 mg./d. He had previously been on Brilinta which was discontinued in June of this year after he came in with GI bleeding. During that admission he had colonoscopy with EMR of a large right sided colon polyp. He had previously had an EGD and colonoscopy in 11/2019 when he was initially seen by us for GI bleeding where he had the above large colon polyp which was only partially removed at that time and colonic AVM's that were successfully APC'ed. He had an EGD then in 11/2019 which showed mild erosive gastritis. He has been having dark stools at home and was told to come in to the hospital by his Hematologist for a low OPT Hb. He has no c/o abdominal pain or gross hematochezia or N/V or hematemesis and has had no prior Video Capsule Endoscopy. He has had a - CT enterography in the past.      REVIEW OF SYSTEMS:  Constitutional: No fever, weight loss or fatigue  ENMT:  No difficulty hearing, tinnitus, vertigo; No sinus or throat pain  Respiratory: No cough, wheezing, chills or hemoptysis  Cardiovascular: No chest pain, palpitations, dizziness or leg swelling  Gastrointestinal: As per HPI.  Skin: No itching, burning, rashes or lesions   Musculoskeletal: No joint pain or swelling; No muscle, back or extremity pain  Patient has no cardiopulmonary, peripheral vascular, musculoskeletal, dermatological, neurological, or psychological symptoms or complaints at this time.    PAST MEDICAL & SURGICAL HISTORY:  GI bleeding  due to ulcerated polyps and colonic AVMs    Aortic stenosis  - s/p valve replacement    ESRD on dialysis  HD on Mondays and Fridays    Risk factors for obstructive sleep apnea    Anemia    RICHARDS (dyspnea on exertion)    VT (ventricular tachycardia)    HTN (hypertension)    CAD (coronary artery disease)    Arrhythmia    AV block, 1st degree    DM (diabetes mellitus)  - resolved    S/P TAVR (transcatheter aortic valve replacement)    H/O carotid endarterectomy  Right    A-V fistula  left arm 5/2017    H/O angioplasty  2013,  no  intervention    H/O left knee surgery    H/O circumcision  at  age  65        FAMILY HISTORY:  FH: type 2 diabetes    Family history of premature CAD    Family history of lung cancer (Grandparent)    Family history of cancer (Grandparent)        SOCIAL HISTORY:  Smoking Status: [ ] Current, [ ] Former, [x ] Never  Pack Years: N/A. No ETOH or drug abuse history.    MEDICATIONS:  MEDICATIONS  (STANDING):  acetaminophen   Tablet .. 650 milliGRAM(s) Oral every 6 hours  atorvastatin 80 milliGRAM(s) Oral at bedtime  cloNIDine 0.1 milliGRAM(s) Oral at bedtime  doxazosin 1 milliGRAM(s) Oral at bedtime  DULoxetine 60 milliGRAM(s) Oral daily  furosemide   Injectable 40 milliGRAM(s) IV Push once  hydrALAZINE  Oral Tab/Cap - Peds 50 milliGRAM(s) Oral two times a day before meals  isosorbide   mononitrate ER Tablet (IMDUR) 30 milliGRAM(s) Oral daily  multivitamin 1 Tablet(s) Oral daily  NIFEdipine XL 60 milliGRAM(s) Oral at bedtime  NIFEdipine XL 90 milliGRAM(s) Oral daily  pantoprazole    Tablet 40 milliGRAM(s) Oral before breakfast  sevelamer carbonate 800 milliGRAM(s) Oral three times a day with meals    MEDICATIONS  (PRN):  traMADol 25 milliGRAM(s) Oral every 6 hours PRN Moderate Pain (4 - 6)      Allergies    Plavix (Hives)  Toprol-XL (Rash)    Intolerances        Vital Signs Last 24 Hrs  T(C): 36.7 (12 Nov 2020 18:24), Max: 36.7 (12 Nov 2020 18:24)  T(F): 98.1 (12 Nov 2020 18:24), Max: 98.1 (12 Nov 2020 18:24)  HR: 85 (12 Nov 2020 18:24) (78 - 85)  BP: 134/60 (12 Nov 2020 18:24) (134/60 - 147/52)  BP(mean): --  RR: 18 (12 Nov 2020 18:24) (18 - 18)  SpO2: 98% (12 Nov 2020 18:24) (98% - 99%)        PHYSICAL EXAM:    General: Well developed; well nourished; in no acute distress  HEENT: MMM, conjunctiva pale and sclera anicteric.  Lungs: Clear bilaterally.  Cor: RRR S1, S2 only.  Gastrointestinal: Abdomen: Soft, non-tender non-distended; Normal bowel sounds; No rebound or guarding or HSM.  ANDRE: Decreased sphincter tone with significant enlarged smooth prostate with lauren brown hemoccult + stool. No BRB or melena noted.  Extremities: RLE immobilized and tender to palpation but according to his daughter much less swollen than pre- stenting  two weeks ago.  Neurological: Alert and oriented x3  Skin: Warm and dry. No obvious rash      LABS:                        5.7    9.45  )-----------( 243      ( 12 Nov 2020 16:19 )             19.6     11-12    136  |  98  |  112.0<H>  ----------------------------<  154<H>  5.1   |  22.0  |  5.16<H>    Ca    8.8      12 Nov 2020 16:19    TPro  5.4<L>  /  Alb  3.1<L>  /  TBili  0.2<L>  /  DBili  x   /  AST  18  /  ALT  10  /  AlkPhos  87  11-12          RADIOLOGY & ADDITIONAL STUDIES:

## 2020-11-12 NOTE — ED PROVIDER NOTE - ATTENDING CONTRIBUTION TO CARE
I personally saw the patient with the resident, and completed the key components of the history and physical exam. I then discussed the management plan with the resident.    85 y/o male with PMHx of HTN, HLD, CAD, HFpEF, s/p Right CEA for Carotid artery disease, ESRD on HD 2d/week via LUE fistula, s/p flecainide w/ ILR placed 6/2020, AS s/p TAVR, PAD with RLE revasc on 10/2020, on Eliquis and Plavix, anemia requiring prior transfusions, presents to ED with daughter (who works here as a nurse), due to anemia.  Patient has had a few days of dark stools. pe awake alert; weak pale heent ncat neck  supple cor s1 s2 lungs clear abd soft nontender; neuro nonfocal  dx anemia

## 2020-11-12 NOTE — H&P ADULT - NSHPREVIEWOFSYSTEMS_GEN_ALL_CORE
CONSTITUTIONAL: No fever, has fatigue  RESPIRATORY: No cough, No shortness of breath  CARDIOVASCULAR: No chest pain, palpitations  GASTROINTESTINAL: No abdominal, No nausea, vomiting  NEUROLOGICAL: No headaches,  loss of strength.  MISCELLANEOUS: No joint swelling or pain

## 2020-11-12 NOTE — CONSULT NOTE ADULT - PROBLEM SELECTOR RECOMMENDATION 9
Pt. may well have small bowel AVM's Repeat EGD once optimized was offered to the pt's daughter who refused  even though his last EGD was done roughly one year ago. Would transfuse to Hb of 8 grams or higher and keep on IV Pantoprazole for GI mucosal cytoprotection. He was on PO Pantoprazole 40 mg./d. at home. OK to feed pt. as he is not having an active clinically evident GI bleed. Repeat labs ordered for the AM. OPT VCE once discharged. Pt. may well have small bowel AVM's Repeat EGD once optimized was offered to the pt's daughter who refused  even though his last EGD was done roughly one year ago. Would transfuse to Hb of 8 grams or higher and keep on IV Pantoprazole for GI mucosal cytoprotection. He was on PO Pantoprazole 40 mg./d. at home. OK to feed pt. as he is not having an active clinically evident GI bleed. Repeat labs ordered for the AM. OPT VCE once discharged. Cardiology consult and clearance for EGD if pt's daughter changes her mind. Will re-approach her tomorrow regarding this possibility.

## 2020-11-12 NOTE — ED ADULT TRIAGE NOTE - CHIEF COMPLAINT QUOTE
Pt. sent in from [facility] for Hemoglobin of 5.3.  Pt. with recent stent placement 2 weeks ago on eliquis.  Pt. complaining of right leg pain; noted to be swollen on arrival.  Pt. denies chest pain of sob

## 2020-11-12 NOTE — H&P ADULT - NSHPPHYSICALEXAM_GEN_ALL_CORE
Vital Signs Last 24 Hrs  T(C): 36.6 (12 Nov 2020 15:04), Max: 36.6 (12 Nov 2020 15:04)  T(F): 97.8 (12 Nov 2020 15:04), Max: 97.8 (12 Nov 2020 15:04)  HR: 78 (12 Nov 2020 15:04) (78 - 78)  BP: 147/52 (12 Nov 2020 15:04) (147/52 - 147/52)  BP(mean): --  RR: 18 (12 Nov 2020 15:04) (18 - 18)  SpO2: 99% (12 Nov 2020 15:04) (99% - 99%)    PHYSICAL EXAM:    GENERAL: Elderly male looking   HEENT: PERRL, +EOMI  NECK: soft, Supple, No JVD,   CHEST/LUNG: Clear to auscultate bilaterally; No wheezing  HEART: S1S2+, Regular rate and rhythm; No murmurs, rubs, or gallops  ABDOMEN: Soft, Nontender, Nondistended; Bowel sounds present  EXTREMITIES:  2+ Peripheral Pulses, No clubbing, cyanosis, or edema  SKIN: No rashes or lesions  NEURO: AAOX3, no focal deficits, no motor r sensory loss  PSYCH: normal mood

## 2020-11-12 NOTE — ED ADULT NURSE REASSESSMENT NOTE - NS ED NURSE REASSESS COMMENT FT1
pt care assumed from previous RN. pt resting comfortably. blood transfusion in progress upon assumption of care. completed at 2120. no adverse reactions noted. VSS pt to be given lasix at this time. report given to receiving RN awaiting transport.

## 2020-11-12 NOTE — H&P ADULT - NSHPLABSRESULTS_GEN_ALL_CORE
CHRISTIANO ELIZABETH    [id number]    86y      Male    Patient is a 86y old  Male who presents with a chief complaint of     INTERVAL HPI/OVERNIGHT EVENTS:    REVIEW OF SYSTEMS:              LABS:                        5.7    9.45  )-----------( 243      ( 12 Nov 2020 16:19 )             19.6     11-12    136  |  98  |  112.0<H>  ----------------------------<  154<H>  5.1   |  22.0  |  5.16<H>    Ca    8.8      12 Nov 2020 16:19    TPro  5.4<L>  /  Alb  3.1<L>  /  TBili  0.2<L>  /  DBili  x   /  AST  18  /  ALT  10  /  AlkPhos  87  11-12    PT/INR - ( 12 Nov 2020 16:19 )   PT: 15.2 sec;   INR: 1.33 ratio         PTT - ( 12 Nov 2020 16:19 )  PTT:34.4 sec        I&O's Summary      MEDICATIONS  (STANDING):  acetaminophen   Tablet .. 650 milliGRAM(s) Oral every 6 hours  atorvastatin 80 milliGRAM(s) Oral at bedtime  cloNIDine 0.1 milliGRAM(s) Oral at bedtime  doxazosin 1 milliGRAM(s) Oral at bedtime  DULoxetine 60 milliGRAM(s) Oral daily  furosemide   Injectable 40 milliGRAM(s) IV Push once  hydrALAZINE  Oral Tab/Cap - Peds 50 milliGRAM(s) Oral two times a day before meals  isosorbide   mononitrate ER Tablet (IMDUR) 30 milliGRAM(s) Oral daily  multivitamin 1 Tablet(s) Oral daily  NIFEdipine XL 60 milliGRAM(s) Oral at bedtime  NIFEdipine XL 90 milliGRAM(s) Oral daily  pantoprazole    Tablet 40 milliGRAM(s) Oral before breakfast  sevelamer carbonate 800 milliGRAM(s) Oral three times a day with meals    MEDICATIONS  (PRN):  traMADol 25 milliGRAM(s) Oral every 6 hours PRN Moderate Pain (4 - 6)

## 2020-11-12 NOTE — H&P ADULT - HISTORY OF PRESENT ILLNESS
85 y/o male with PMHx of HTN, HLD, CAD, HFpEF, s/p Right CEA for Carotid artery disease, ESRD on HD 2d/week via LUE fistula, s/p flecainide w/ ILR placed 6/2020, AS s/p TAVR, PAD with RLE revasc on 10/2020, on Eliquis and Plavix, anemia requiring prior transfusions, presents to ED with daughter (who works here as a nurse), due to anemia.  Patient has had a few days of dark stools. He also endorses RLE pain that has been present for some time.  Daughter says it has improved significantly since revascularization.  Otherwise denies pain, bleeding, SOB, chest pain, abdominal pain or fevers.  Denies other pertinent medical problems.  Denies any overt substance use. 87 y/o male with PMHx of HTN, HLD, CAD, HFpEF, s/p Right CEA for Carotid artery disease, ESRD on HD 2d/week via LUE fistula, s/p flecainide w/ ILR placed 6/2020, AS s/p TAVR, PAD with RLE revasc on 10/2020, on Eliquis and Plavix, anemia requiring prior transfusions, recent got blood transfusion at Dr Varghese office, he was brought in here because of low blood count, as he got a call from Dr Baker's office, as per Daughter  Patient has had a few days of dark stools, he denies any bloody stools recently, denies chest pain, abdominal pain or fevers.  Denies other pertinent medical problems.  Denies any overt substance use.

## 2020-11-12 NOTE — ED PROVIDER NOTE - OBJECTIVE STATEMENT
87 y/o male with PMHx of HTN, HLD, CAD, HFpEF, s/p Right CEA for Carotid artery disease, ESRD on HD 2d/week via LUE fistula, s/p flecainide w/ ILR placed 6/2020, AS s/p TAVR, PAD with RLE revasc on 10/2020, on Eliquis and Plavix, anemia requiring prior transfusions, presents to ED with daughter (who works here as a nurse), due to anemia.  Patient has had a few days of dark stools. He also endorses RLE pain that has been present for some time.  Daughter says it has improved significantly since revascularization.  Otherwise denies pain, bleeding, SOB, chest pain, abdominal pain or fevers.  Denies other pertinent medical problems.  Denies any overt substance use.

## 2020-11-13 ENCOUNTER — APPOINTMENT (OUTPATIENT)
Dept: ELECTROPHYSIOLOGY | Facility: CLINIC | Age: 85
End: 2020-11-13

## 2020-11-13 ENCOUNTER — APPOINTMENT (OUTPATIENT)
Age: 85
End: 2020-11-13

## 2020-11-13 DIAGNOSIS — K25.9 GASTRIC ULCER, UNSPECIFIED AS ACUTE OR CHRONIC, WITHOUT HEMORRHAGE OR PERFORATION: ICD-10-CM

## 2020-11-13 LAB
ANION GAP SERPL CALC-SCNC: 19 MMOL/L — HIGH (ref 5–17)
BASOPHILS # BLD AUTO: 0.14 K/UL — SIGNIFICANT CHANGE UP (ref 0–0.2)
BASOPHILS NFR BLD AUTO: 1.4 % — SIGNIFICANT CHANGE UP (ref 0–2)
BUN SERPL-MCNC: 124 MG/DL — HIGH (ref 8–20)
CALCIUM SERPL-MCNC: 9.5 MG/DL — SIGNIFICANT CHANGE UP (ref 8.6–10.2)
CHLORIDE SERPL-SCNC: 101 MMOL/L — SIGNIFICANT CHANGE UP (ref 98–107)
CO2 SERPL-SCNC: 18 MMOL/L — LOW (ref 22–29)
CREAT SERPL-MCNC: 5.19 MG/DL — HIGH (ref 0.5–1.3)
EOSINOPHIL # BLD AUTO: 0.25 K/UL — SIGNIFICANT CHANGE UP (ref 0–0.5)
EOSINOPHIL NFR BLD AUTO: 2.6 % — SIGNIFICANT CHANGE UP (ref 0–6)
GLUCOSE SERPL-MCNC: 141 MG/DL — HIGH (ref 70–99)
HCT VFR BLD CALC: 26 % — LOW (ref 39–50)
HGB BLD-MCNC: 7.9 G/DL — LOW (ref 13–17)
IMM GRANULOCYTES NFR BLD AUTO: 0.6 % — SIGNIFICANT CHANGE UP (ref 0–1.5)
INR BLD: 1.17 RATIO — HIGH (ref 0.88–1.16)
LYMPHOCYTES # BLD AUTO: 0.74 K/UL — LOW (ref 1–3.3)
LYMPHOCYTES # BLD AUTO: 7.6 % — LOW (ref 13–44)
MCHC RBC-ENTMCNC: 27.7 PG — SIGNIFICANT CHANGE UP (ref 27–34)
MCHC RBC-ENTMCNC: 30.4 GM/DL — LOW (ref 32–36)
MCV RBC AUTO: 91.2 FL — SIGNIFICANT CHANGE UP (ref 80–100)
MONOCYTES # BLD AUTO: 0.59 K/UL — SIGNIFICANT CHANGE UP (ref 0–0.9)
MONOCYTES NFR BLD AUTO: 6.1 % — SIGNIFICANT CHANGE UP (ref 2–14)
NEUTROPHILS # BLD AUTO: 7.92 K/UL — HIGH (ref 1.8–7.4)
NEUTROPHILS NFR BLD AUTO: 81.7 % — HIGH (ref 43–77)
PLATELET # BLD AUTO: 235 K/UL — SIGNIFICANT CHANGE UP (ref 150–400)
POTASSIUM SERPL-MCNC: 5 MMOL/L — SIGNIFICANT CHANGE UP (ref 3.5–5.3)
POTASSIUM SERPL-SCNC: 5 MMOL/L — SIGNIFICANT CHANGE UP (ref 3.5–5.3)
PROTHROM AB SERPL-ACNC: 13.5 SEC — SIGNIFICANT CHANGE UP (ref 10.6–13.6)
RBC # BLD: 2.85 M/UL — LOW (ref 4.2–5.8)
RBC # FLD: 20.4 % — HIGH (ref 10.3–14.5)
SARS-COV-2 IGG SERPL QL IA: NEGATIVE — SIGNIFICANT CHANGE UP
SARS-COV-2 IGM SERPL IA-ACNC: 0.52 INDEX — SIGNIFICANT CHANGE UP
SODIUM SERPL-SCNC: 137 MMOL/L — SIGNIFICANT CHANGE UP (ref 135–145)
TROPONIN T SERPL-MCNC: 0.13 NG/ML — HIGH (ref 0–0.06)
WBC # BLD: 9.7 K/UL — SIGNIFICANT CHANGE UP (ref 3.8–10.5)
WBC # FLD AUTO: 9.7 K/UL — SIGNIFICANT CHANGE UP (ref 3.8–10.5)

## 2020-11-13 PROCEDURE — 99223 1ST HOSP IP/OBS HIGH 75: CPT | Mod: 25

## 2020-11-13 PROCEDURE — 99223 1ST HOSP IP/OBS HIGH 75: CPT

## 2020-11-13 PROCEDURE — 90937 HEMODIALYSIS REPEATED EVAL: CPT

## 2020-11-13 PROCEDURE — 99233 SBSQ HOSP IP/OBS HIGH 50: CPT

## 2020-11-13 PROCEDURE — 44360 SMALL BOWEL ENDOSCOPY: CPT

## 2020-11-13 PROCEDURE — 93279 PRGRMG DEV EVAL PM/LDLS PM: CPT | Mod: 26

## 2020-11-13 RX ORDER — ERYTHROPOIETIN 10000 [IU]/ML
40000 INJECTION, SOLUTION INTRAVENOUS; SUBCUTANEOUS ONCE
Refills: 0 | Status: DISCONTINUED | OUTPATIENT
Start: 2020-11-13 | End: 2020-11-13

## 2020-11-13 RX ORDER — PANTOPRAZOLE SODIUM 20 MG/1
40 TABLET, DELAYED RELEASE ORAL
Refills: 0 | Status: DISCONTINUED | OUTPATIENT
Start: 2020-11-13 | End: 2020-11-14

## 2020-11-13 RX ORDER — ERYTHROPOIETIN 10000 [IU]/ML
40000 INJECTION, SOLUTION INTRAVENOUS; SUBCUTANEOUS ONCE
Refills: 0 | Status: COMPLETED | OUTPATIENT
Start: 2020-11-13 | End: 2020-11-13

## 2020-11-13 RX ORDER — SUCRALFATE 1 G
1 TABLET ORAL
Refills: 0 | Status: DISCONTINUED | OUTPATIENT
Start: 2020-11-13 | End: 2020-11-14

## 2020-11-13 RX ADMIN — Medication 50 MILLIGRAM(S): at 18:07

## 2020-11-13 RX ADMIN — Medication 1 TABLET(S): at 18:07

## 2020-11-13 RX ADMIN — Medication 650 MILLIGRAM(S): at 19:45

## 2020-11-13 RX ADMIN — ATORVASTATIN CALCIUM 80 MILLIGRAM(S): 80 TABLET, FILM COATED ORAL at 21:54

## 2020-11-13 RX ADMIN — Medication 650 MILLIGRAM(S): at 00:00

## 2020-11-13 RX ADMIN — ISOSORBIDE MONONITRATE 30 MILLIGRAM(S): 60 TABLET, EXTENDED RELEASE ORAL at 11:42

## 2020-11-13 RX ADMIN — Medication 50 MILLIGRAM(S): at 08:35

## 2020-11-13 RX ADMIN — PANTOPRAZOLE SODIUM 40 MILLIGRAM(S): 20 TABLET, DELAYED RELEASE ORAL at 18:15

## 2020-11-13 RX ADMIN — PANTOPRAZOLE SODIUM 40 MILLIGRAM(S): 20 TABLET, DELAYED RELEASE ORAL at 08:37

## 2020-11-13 RX ADMIN — TRAMADOL HYDROCHLORIDE 25 MILLIGRAM(S): 50 TABLET ORAL at 16:00

## 2020-11-13 RX ADMIN — DULOXETINE HYDROCHLORIDE 60 MILLIGRAM(S): 30 CAPSULE, DELAYED RELEASE ORAL at 18:07

## 2020-11-13 RX ADMIN — SEVELAMER CARBONATE 800 MILLIGRAM(S): 2400 POWDER, FOR SUSPENSION ORAL at 18:07

## 2020-11-13 RX ADMIN — Medication 650 MILLIGRAM(S): at 11:35

## 2020-11-13 RX ADMIN — Medication 0.1 MILLIGRAM(S): at 21:54

## 2020-11-13 RX ADMIN — TRAMADOL HYDROCHLORIDE 25 MILLIGRAM(S): 50 TABLET ORAL at 17:00

## 2020-11-13 RX ADMIN — Medication 90 MILLIGRAM(S): at 05:37

## 2020-11-13 RX ADMIN — TRAMADOL HYDROCHLORIDE 25 MILLIGRAM(S): 50 TABLET ORAL at 03:39

## 2020-11-13 RX ADMIN — TRAMADOL HYDROCHLORIDE 25 MILLIGRAM(S): 50 TABLET ORAL at 03:24

## 2020-11-13 RX ADMIN — Medication 1 MILLIGRAM(S): at 21:51

## 2020-11-13 RX ADMIN — Medication 650 MILLIGRAM(S): at 18:07

## 2020-11-13 RX ADMIN — Medication 650 MILLIGRAM(S): at 05:37

## 2020-11-13 RX ADMIN — SEVELAMER CARBONATE 800 MILLIGRAM(S): 2400 POWDER, FOR SUSPENSION ORAL at 18:15

## 2020-11-13 RX ADMIN — Medication 650 MILLIGRAM(S): at 12:05

## 2020-11-13 RX ADMIN — Medication 60 MILLIGRAM(S): at 21:54

## 2020-11-13 RX ADMIN — Medication 650 MILLIGRAM(S): at 01:15

## 2020-11-13 RX ADMIN — ERYTHROPOIETIN 40000 UNIT(S): 10000 INJECTION, SOLUTION INTRAVENOUS; SUBCUTANEOUS at 18:08

## 2020-11-13 NOTE — BRIEF OPERATIVE NOTE - NSICDXBRIEFPROCEDURE_GEN_ALL_CORE_FT
PROCEDURES:  EGD, with push enteroscopy 13-Nov-2020 13:35:58  Erinn Contreras  EGD, with control of hemorrhage 13-Nov-2020 13:35:17  Erinn Contreras

## 2020-11-13 NOTE — PROGRESS NOTE ADULT - SUBJECTIVE AND OBJECTIVE BOX
CHRISTIANO ELIZABETH    [id number]    86y      Male    Patient is a 86y old  Male who presents with a chief complaint of Low blood count (13 Nov 2020 10:47)      INTERVAL HPI/OVERNIGHT EVENTS:    Patient is doing ok, he has no dizziness, chest pain, sob, he got 2 units of blood last night tolerated it well, hb is 7.8 now     REVIEW OF SYSTEMS:    CONSTITUTIONAL: No fever, some fatigue  RESPIRATORY: No cough, No shortness of breath  CARDIOVASCULAR: No chest pain, palpitations  GASTROINTESTINAL: No abdominal, No nausea, vomiting  NEUROLOGICAL: No headaches,  loss of strength.  MISCELLANEOUS: has some pain in the right foot      Vital Signs Last 24 Hrs  T(C): 36.4 (13 Nov 2020 08:42), Max: 36.9 (12 Nov 2020 23:39)  T(F): 97.6 (13 Nov 2020 08:42), Max: 98.4 (12 Nov 2020 23:39)  HR: 87 (13 Nov 2020 11:44) (76 - 94)  BP: 178/54 (13 Nov 2020 11:44) (134/60 - 189/73)  RR: 18 (13 Nov 2020 08:42) (18 - 18)  SpO2: 97% (13 Nov 2020 08:42) (95% - 100%)    PHYSICAL EXAM:    GENERAL: Elderly male looking comfortable   HEENT: PERRL, +EOMI  NECK: soft, Supple, No JVD,   CHEST/LUNG: Clear to auscultate bilaterally; No wheezing  HEART: S1S2+, Regular rate and rhythm; No murmurs  ABDOMEN: Soft, Nontender, Nondistended; Bowel sounds present  EXTREMITIES:  1+ Peripheral Pulses, right foot with some swelling and leg has edema, scars related to recent revascularization procedure  SKIN: No rashes or lesions  NEURO: AAOX3, no focal deficits, no motor r sensory loss  PSYCH: normal mood      LABS:                        7.9    9.70  )-----------( 235      ( 13 Nov 2020 06:37 )             26.0     11-13    137  |  101  |  124.0<H>  ----------------------------<  141<H>  5.0   |  18.0<L>  |  5.19<H>    Ca    9.5      13 Nov 2020 06:37    TPro  5.4<L>  /  Alb  3.1<L>  /  TBili  0.2<L>  /  DBili  x   /  AST  18  /  ALT  10  /  AlkPhos  87  11-12    PT/INR - ( 13 Nov 2020 06:37 )   PT: 13.5 sec;   INR: 1.17 ratio         PTT - ( 12 Nov 2020 16:19 )  PTT:34.4 sec        I&O's Summary    12 Nov 2020 07:01  -  13 Nov 2020 07:00  --------------------------------------------------------  IN: 130 mL / OUT: 500 mL / NET: -370 mL        MEDICATIONS  (STANDING):  acetaminophen   Tablet .. 650 milliGRAM(s) Oral every 6 hours  atorvastatin 80 milliGRAM(s) Oral at bedtime  cloNIDine 0.1 milliGRAM(s) Oral at bedtime  doxazosin 1 milliGRAM(s) Oral at bedtime  DULoxetine 60 milliGRAM(s) Oral daily  hydrALAZINE  Oral Tab/Cap - Peds 50 milliGRAM(s) Oral two times a day before meals  isosorbide   mononitrate ER Tablet (IMDUR) 30 milliGRAM(s) Oral daily  multivitamin 1 Tablet(s) Oral daily  NIFEdipine XL 60 milliGRAM(s) Oral at bedtime  NIFEdipine XL 90 milliGRAM(s) Oral daily  pantoprazole  Injectable 40 milliGRAM(s) IV Push daily  sevelamer carbonate 800 milliGRAM(s) Oral three times a day with meals    MEDICATIONS  (PRN):  traMADol 25 milliGRAM(s) Oral every 6 hours PRN Moderate Pain (4 - 6)

## 2020-11-13 NOTE — CONSULT NOTE ADULT - ASSESSMENT
1) ESRD on HD  2) MBD of renal dx  3) Anemia of renal dx; ?bleed GI  4) Vol HTN    Plan for HD today; consented at bedside  OK to proceed with EGD prior to HD   Will give procrit 40k units x 1 ;   Reeval for HD; 1 unit PRBC on HD    dw daughter; Dr Erinn Contreras GI;
87yo M with PMHx of HTN, HLD, CAD, HFpEF, and PAD with RLE revascularization on 10/22/2020, on Eliquis and Plavix presents with GI Bleed, found with significant anemia Hgb 5.7 requiring transfusion. RLE is tender likely related to reperfusion. No signs of infection, patent distal flow. Hemodynamically stable.    Recommendations:  - PLEASE CONTINUE PLAVIX as patient with new stents and would be at high risk of occlusion without  - May hold eliquis in setting of active bleed  - May follow up outpatient.  - GIB management per primary team.    Plan discussed with Dr. Albrecht who agrees.
87 y/o male with PMHx of HTN, HLD, CAD, HFpEF, s/p Right CEA for Carotid artery disease, ESRD on HD 2d/week via LUE fistula, s/p flecainide w/ ILR placed 6/2020, AS s/p TAVR, PAD with RLE revasc on 10/2020, on Eliquis and Plavix, anemia requiring prior transfusions, presents to ED with daughter (who works here as a nurse), due to anemia.   Patient is well known to Heme Onc and follows up in our office    Anemia  Occult blood in stool  Redness / Swelling in RLE.    Patient with chronic anemia 2/2 ESRD receiving erythropoietin on HD  Patient with drop in Hb on presentation along with occult blood positive   GI consulted, who will consider EGD onc Hb > 8 however daughter refused.     - s/p PRBC transfusion Hb 7.9 this morning   - Continue to trend Hb   -Vascular consulted continue Plavix can hold eliquis  - Redness/ swelling/ pain in Rt LE, evaluate for starting antibiotics with Vascular surgery

## 2020-11-13 NOTE — BRIEF OPERATIVE NOTE - COMMENTS
A/P  small antral ulcer. No active bleeding. The ulcer was clipped  No bleeding, no AVMS up to the proximal jejunum.   Protonix bid  carafate qid   clear liquid diet  pt with hx chronic anemia . Will be high risk for anemia with double anticoagulation. Will need to speak to vascular regarding elaquis and plavix A/P  small antral ulcer. No active bleeding. The ulcer was clipped  No bleeding, no AVMS up to the proximal jejunum.   Protonix bid  carafate qid   clear liquid diet- advance to regular diet tomorrow  pt with hx chronic anemia . Will be high risk for anemia with double anticoagulation. Will need to speak to vascular regarding elaquis and plavix.     If pt must be  anticoagulation, then would start plavix  tomorrow and hold off on Elaquis

## 2020-11-13 NOTE — CONSULT NOTE ADULT - SUBJECTIVE AND OBJECTIVE BOX
REASON FOR CONSULTATION    HPI:  85 y/o male with PMHx of HTN, HLD, CAD, HFpEF, s/p Right CEA for Carotid artery disease, ESRD on HD 2d/week via LUE fistula, s/p flecainide w/ ILR placed 6/2020, AS s/p TAVR, PAD with RLE revasc on 10/2020, on Eliquis and Plavix, anemia requiring prior transfusions, presents to ED with daughter (who works here as a nurse), due to anemia.   PAtient is well known to Heme ONc and follows up in our office   Patient has had a few days of dark stools. He also endorses RLE pain that has been present for some time.  Daughter says it has improved significantly since revascularization.  Otherwise denies pain, bleeding, SOB, chest pain, abdominal pain or fevers.  Denies other pertinent medical problems.  Denies any overt substance use.       REVIEW OF SYSTEMS:      PAST MEDICAL & SURGICAL HISTORY:  GI bleeding  due to ulcerated polyps and colonic AVMs    Aortic stenosis  - s/p valve replacement    ESRD on dialysis  HD on Mondays and Fridays    Risk factors for obstructive sleep apnea    Anemia    RICHARDS (dyspnea on exertion)    VT (ventricular tachycardia)    HTN (hypertension)    CAD (coronary artery disease)    Arrhythmia    AV block, 1st degree    DM (diabetes mellitus)  - resolved    S/P TAVR (transcatheter aortic valve replacement)    H/O carotid endarterectomy  Right    A-V fistula  left arm 5/2017    H/O angioplasty  2013,  no  intervention    H/O left knee surgery    H/O circumcision  at  age  65        SOCIAL HISTORY:    FAMILY HISTORY:  FH: type 2 diabetes    Family history of premature CAD    Family history of lung cancer (Grandparent)    Family history of cancer (Grandparent)        SOCIAL HISTORY:    Allergies    Plavix (Hives)  Toprol-XL (Rash)    Intolerances        MEDICATIONS  (STANDING):  acetaminophen   Tablet .. 650 milliGRAM(s) Oral every 6 hours  atorvastatin 80 milliGRAM(s) Oral at bedtime  cloNIDine 0.1 milliGRAM(s) Oral at bedtime  doxazosin 1 milliGRAM(s) Oral at bedtime  DULoxetine 60 milliGRAM(s) Oral daily  hydrALAZINE  Oral Tab/Cap - Peds 50 milliGRAM(s) Oral two times a day before meals  isosorbide   mononitrate ER Tablet (IMDUR) 30 milliGRAM(s) Oral daily  multivitamin 1 Tablet(s) Oral daily  NIFEdipine XL 60 milliGRAM(s) Oral at bedtime  NIFEdipine XL 90 milliGRAM(s) Oral daily  pantoprazole  Injectable 40 milliGRAM(s) IV Push daily  sevelamer carbonate 800 milliGRAM(s) Oral three times a day with meals    MEDICATIONS  (PRN):  traMADol 25 milliGRAM(s) Oral every 6 hours PRN Moderate Pain (4 - 6)      Vital Signs Last 24 Hrs  T(C): 36.4 (13 Nov 2020 08:42), Max: 36.9 (12 Nov 2020 23:39)  T(F): 97.6 (13 Nov 2020 08:42), Max: 98.4 (12 Nov 2020 23:39)  HR: 76 (13 Nov 2020 08:42) (76 - 94)  BP: 189/73 (13 Nov 2020 08:42) (134/60 - 189/73)  BP(mean): --  RR: 18 (13 Nov 2020 08:42) (18 - 18)  SpO2: 97% (13 Nov 2020 08:42) (95% - 100%)    PHYSICAL EXAM:        LABS:                        7.9    9.70  )-----------( 235      ( 13 Nov 2020 06:37 )             26.0     11-13    137  |  101  |  124.0<H>  ----------------------------<  141<H>  5.0   |  18.0<L>  |  5.19<H>    Ca    9.5      13 Nov 2020 06:37    TPro  5.4<L>  /  Alb  3.1<L>  /  TBili  0.2<L>  /  DBili  x   /  AST  18  /  ALT  10  /  AlkPhos  87  11-12    PT/INR - ( 13 Nov 2020 06:37 )   PT: 13.5 sec;   INR: 1.17 ratio         PTT - ( 12 Nov 2020 16:19 )  PTT:34.4 sec        RADIOLOGY & ADDITIONAL STUDIES:    PATHOLOGY:     REASON FOR CONSULTATION    HPI:  85 y/o male with PMHx of HTN, HLD, CAD, HFpEF, s/p Right CEA for Carotid artery disease, ESRD on HD 2d/week via LUE fistula, s/p flecainide w/ ILR placed 6/2020, AS s/p TAVR, PAD with RLE revasc on 10/2020, on Eliquis and Plavix, anemia requiring prior transfusions, presents to ED with daughter (who works here as a nurse), due to anemia.   PAtient is well known to Heme ONc and follows up in our office   Patient has had a few days of dark stools.   He also endorses RLE pain that has been present for some time.  Redness / Swelling in RLE. Vascular consulted continue Plavix can hold eliquis  Daughter says it has improved significantly since revascularization.  Otherwise denies pain, bleeding, SOB, chest pain, abdominal pain or fevers.  Denies other pertinent medical problems.  Denies any overt substance use.       REVIEW OF SYSTEMS:      PAST MEDICAL & SURGICAL HISTORY:  GI bleeding  due to ulcerated polyps and colonic AVMs    Aortic stenosis  - s/p valve replacement    ESRD on dialysis  HD on Mondays and Fridays    Risk factors for obstructive sleep apnea    Anemia    RICHARDS (dyspnea on exertion)    VT (ventricular tachycardia)    HTN (hypertension)    CAD (coronary artery disease)    Arrhythmia    AV block, 1st degree    DM (diabetes mellitus)  - resolved    S/P TAVR (transcatheter aortic valve replacement)    H/O carotid endarterectomy  Right    A-V fistula  left arm 5/2017    H/O angioplasty  2013,  no  intervention    H/O left knee surgery    H/O circumcision  at  age  65        SOCIAL HISTORY:    FAMILY HISTORY:  FH: type 2 diabetes    Family history of premature CAD    Family history of lung cancer (Grandparent)    Family history of cancer (Grandparent)        SOCIAL HISTORY:    Allergies    Plavix (Hives)  Toprol-XL (Rash)    Intolerances        MEDICATIONS  (STANDING):  acetaminophen   Tablet .. 650 milliGRAM(s) Oral every 6 hours  atorvastatin 80 milliGRAM(s) Oral at bedtime  cloNIDine 0.1 milliGRAM(s) Oral at bedtime  doxazosin 1 milliGRAM(s) Oral at bedtime  DULoxetine 60 milliGRAM(s) Oral daily  hydrALAZINE  Oral Tab/Cap - Peds 50 milliGRAM(s) Oral two times a day before meals  isosorbide   mononitrate ER Tablet (IMDUR) 30 milliGRAM(s) Oral daily  multivitamin 1 Tablet(s) Oral daily  NIFEdipine XL 60 milliGRAM(s) Oral at bedtime  NIFEdipine XL 90 milliGRAM(s) Oral daily  pantoprazole  Injectable 40 milliGRAM(s) IV Push daily  sevelamer carbonate 800 milliGRAM(s) Oral three times a day with meals    MEDICATIONS  (PRN):  traMADol 25 milliGRAM(s) Oral every 6 hours PRN Moderate Pain (4 - 6)      Vital Signs Last 24 Hrs  T(C): 36.4 (13 Nov 2020 08:42), Max: 36.9 (12 Nov 2020 23:39)  T(F): 97.6 (13 Nov 2020 08:42), Max: 98.4 (12 Nov 2020 23:39)  HR: 76 (13 Nov 2020 08:42) (76 - 94)  BP: 189/73 (13 Nov 2020 08:42) (134/60 - 189/73)  BP(mean): --  RR: 18 (13 Nov 2020 08:42) (18 - 18)  SpO2: 97% (13 Nov 2020 08:42) (95% - 100%)    PHYSICAL EXAM:    GENERAL: In bed   HEENT: PERRL, +EOMI  NECK: soft, Supple, No JVD,   CHEST/LUNG: Clear to auscultate bilaterally; No wheezing  HEART: S1S2+, Regular rate and rhythm; No murmurs, rubs, or gallops  ABDOMEN: Soft, Nontender, Nondistended; Bowel sounds present  EXTREMITIES: swelling in Rt LE   SKIN: No rashes or lesions  NEURO: AAOX3, no focal deficits, no motor r sensory loss  PSYCH: normal mood    LABS:                        7.9    9.70  )-----------( 235      ( 13 Nov 2020 06:37 )             26.0     11-13    137  |  101  |  124.0<H>  ----------------------------<  141<H>  5.0   |  18.0<L>  |  5.19<H>    Ca    9.5      13 Nov 2020 06:37    TPro  5.4<L>  /  Alb  3.1<L>  /  TBili  0.2<L>  /  DBili  x   /  AST  18  /  ALT  10  /  AlkPhos  87  11-12    PT/INR - ( 13 Nov 2020 06:37 )   PT: 13.5 sec;   INR: 1.17 ratio         PTT - ( 12 Nov 2020 16:19 )  PTT:34.4 sec        RADIOLOGY & ADDITIONAL STUDIES:    PATHOLOGY:

## 2020-11-13 NOTE — PROGRESS NOTE ADULT - SUBJECTIVE AND OBJECTIVE BOX
Pt s/p Medtronic Micra leadless pacemaker implant via RFV access 10/29/20. Scheduled for outpatient pacemaker follow up, but now admitted with recurrent anemia.   EP is consulted for pacemaker & site check.     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 Noted on ILR transmission to have episode of CHB > 4 seconds with a single, wide complex escape beat, which correlated with an episode of near syncope and subsequent recurrent episodes of PVC-induced paroxysmal AV block resulting in prolonged asystole and recurrent syncope status post MDT Micra Leadless pacemaker implant 10/29/20.    P: Normally functioning leadless PPM w/ well healed groin.   No change to current care from EP perspective.    Outpatient EP follow up in 4-6 weeks.   Please call EP service with questions or further arrhythmia issues.

## 2020-11-13 NOTE — BRIEF OPERATIVE NOTE - OPERATION/FINDINGS
EGD was performed . There was no active  bleeding. In the antrum, there was a 3 mm white based ulcer.   A clip was applied to the ulcer. D1, D2 normal. NO AVM'S . The EGD scope was removed and pediatric colonoscope was  used. Scope passed to the proximal jejunum. NO AVM's noted.  No active bleeding.  Distal aspect of where the scope reached was tatooed with 3 cc indospot ink. Mild scope trauma  in esophagus with enteroscope  ( friable tissue- mild  )

## 2020-11-13 NOTE — BRIEF OPERATIVE NOTE - NSICDXBRIEFPREOP_GEN_ALL_CORE_FT
PRE-OP DIAGNOSIS:  Melena 13-Nov-2020 13:36:30  Erinn Contreras  Anemia 13-Nov-2020 13:36:18  Erinn Contreras

## 2020-11-13 NOTE — CONSULT NOTE ADULT - SUBJECTIVE AND OBJECTIVE BOX
[facility] DIVISION OF KIDNEY DISEASES AND HYPERTENSION -- INITIAL CONSULT NOTE  --------------------------------------------------------------------------------  HPI:  87 y/o male with PMHx of HTN, HLD, CAD, HFpEF, s/p Right CEA for Carotid artery disease, ESRD on HD 2d/week via LUE fistula, s/p flecainide w/ ILR placed 6/2020, AS s/p TAVR, PAD with RLE revasc on 10/2020, on Eliquis and Plavix, anemia requiring prior transfusions, presents to ED with daughter (who works here as a nurse manger), due to anemia.  Patient has had a few days of dark stools. He also endorses RLE pain that has been present for some time.  Daughter says it has improved significantly since revascularization.  Otherwise denies pain, bleeding, SOB, chest pain, abdominal pain or fevers.  Denies other pertinent medical problems.  Denies any overt substance use. (12 Nov 2020 17:42). He is s/p a recent PPM placed in October 2020 and RLE re-vascularization with RLE stent placement 2 weeks ago and was started on Eliquis 2.5 mg. BID and Plavix 75 mg./d. He had previously been on Brilinta which was discontinued in June of this year after he came in with GI bleeding. During that admission he had colonoscopy with EMR of a large right sided colon polyp. He had previously had an EGD and colonoscopy in 11/2019 when he was initially seen by us for GI bleeding where he had the above large colon polyp which was only partially removed at that time and colonic AVM's that were successfully APC'ed. He had an EGD then in 11/2019 which showed mild erosive gastritis. He has been having dark stools at home and was told to come in to the hospital by his Hematologist for a low OPT Hb. He has no c/o abdominal pain or gross hematochezia or N/V or hematemesis and has had no prior Video Capsule Endoscopy. He has had a - CT enterography in the past.    Pt sp 2 units PRBC overnight; plan for EGD today by Dr Contreras. Seen/examined by me this AM; consented for HD; plan for another unit of PRBC on HD; pt and daughter made aware;       PAST HISTORY  --------------------------------------------------------------------------------  PAST MEDICAL & SURGICAL HISTORY:  GI bleeding  due to ulcerated polyps and colonic AVMs    Aortic stenosis  - s/p valve replacement    ESRD on dialysis  HD on Mondays and Fridays    Risk factors for obstructive sleep apnea    Anemia    RICHARDS (dyspnea on exertion)    VT (ventricular tachycardia)    HTN (hypertension)    CAD (coronary artery disease)    Arrhythmia    AV block, 1st degree    DM (diabetes mellitus)  - resolved    S/P TAVR (transcatheter aortic valve replacement)    H/O carotid endarterectomy  Right    A-V fistula  left arm 5/2017    H/O angioplasty  2013,  no  intervention    H/O left knee surgery    H/O circumcision  at  age  65      FAMILY HISTORY:  FH: type 2 diabetes    Family history of premature CAD    Family history of lung cancer (Grandparent)    Family history of cancer (Grandparent)      PAST SOCIAL HISTORY:    ALLERGIES & MEDICATIONS  --------------------------------------------------------------------------------  Allergies    Plavix (Hives)  Toprol-XL (Rash)    Intolerances      Standing Inpatient Medications  acetaminophen   Tablet .. 650 milliGRAM(s) Oral every 6 hours  atorvastatin 80 milliGRAM(s) Oral at bedtime  cloNIDine 0.1 milliGRAM(s) Oral at bedtime  doxazosin 1 milliGRAM(s) Oral at bedtime  DULoxetine 60 milliGRAM(s) Oral daily  epoetin juan-epbx (RETACRIT) Injectable 95023 Unit(s) IV Push once  hydrALAZINE  Oral Tab/Cap - Peds 50 milliGRAM(s) Oral two times a day before meals  isosorbide   mononitrate ER Tablet (IMDUR) 30 milliGRAM(s) Oral daily  multivitamin 1 Tablet(s) Oral daily  NIFEdipine XL 60 milliGRAM(s) Oral at bedtime  NIFEdipine XL 90 milliGRAM(s) Oral daily  pantoprazole  Injectable 40 milliGRAM(s) IV Push daily  sevelamer carbonate 800 milliGRAM(s) Oral three times a day with meals    PRN Inpatient Medications  traMADol 25 milliGRAM(s) Oral every 6 hours PRN      REVIEW OF SYSTEMS  --------------------------------------------------------------------------------  Gen: No weight changes, fatigue, fevers/chills, weakness  Skin: No rashes  Head/Eyes/Ears/Mouth: No headache; Normal hearing; Normal vision w/o blurriness; No sinus pain/discomfort, sore throat  Respiratory: No dyspnea, cough, wheezing, hemoptysis  CV: No chest pain, PND, orthopnea  GI: No abdominal pain, diarrhea, constipation, nausea, vomiting, melena, hematochezia  : No increased frequency, dysuria, hematuria, nocturia  MSK: No joint pain/swelling; no back pain; no edema  Neuro: No dizziness/lightheadedness, weakness, seizures, numbness, tingling  Heme: No easy bruising or bleeding  Endo: No heat/cold intolerance  Psych: No significant nervousness, anxiety, stress, depression    All other systems were reviewed and are negative, except as noted.    VITALS/PHYSICAL EXAM  --------------------------------------------------------------------------------  T(C): 36.4 (11-13-20 @ 08:42), Max: 36.9 (11-12-20 @ 23:39)  HR: 87 (11-13-20 @ 11:44) (76 - 94)  BP: 178/54 (11-13-20 @ 11:44) (134/60 - 189/73)  RR: 18 (11-13-20 @ 08:42) (18 - 18)  SpO2: 97% (11-13-20 @ 08:42) (95% - 100%)  Wt(kg): --  Height (cm): 165.1 (11-12-20 @ 15:04)  Weight (kg): 66.7 (11-12-20 @ 15:04)  BMI (kg/m2): 24.5 (11-12-20 @ 15:04)  BSA (m2): 1.74 (11-12-20 @ 15:04)      11-12-20 @ 07:01  -  11-13-20 @ 07:00  --------------------------------------------------------  IN: 130 mL / OUT: 500 mL / NET: -370 mL      Physical Exam:  	Gen: NAD   	HEENT: PERRL, supple neck, clear oropharynx  	Pulm: CTA B/L  	CV: RRR, S1S2; no rub  	Back: No spinal or CVA tenderness; no sacral edema  	Abd: +BS, soft, nontender/nondistended  	: No suprapubic tenderness  	UE: Warm, FROM, no clubbing, intact strength; no edema; no asterixis  	LE: Warm, FROM, no clubbing, intact strength; + edema RLE  	Neuro: No focal deficits, intact gait  	Psych: Normal affect and mood  	Skin: Warm, without rashes  	Vascular access:     LABS/STUDIES  --------------------------------------------------------------------------------              7.9    9.70  >-----------<  235      [11-13-20 @ 06:37]              26.0     137  |  101  |  124.0  ----------------------------<  141      [11-13-20 @ 06:37]  5.0   |  18.0  |  5.19        Ca     9.5     [11-13-20 @ 06:37]    TPro  5.4  /  Alb  3.1  /  TBili  0.2  /  DBili  x   /  AST  18  /  ALT  10  /  AlkPhos  87  [11-12-20 @ 16:19]    PT/INR: PT 13.5 , INR 1.17       [11-13-20 @ 06:37]  PTT: 34.4       [11-12-20 @ 16:19]    Troponin 0.13      [11-13-20 @ 06:37]    Creatinine Trend:  SCr 5.19 [11-13 @ 06:37]  SCr 5.16 [11-12 @ 16:19]  SCr 4.54 [10-30 @ 06:03]  SCr 3.69 [10-29 @ 15:32]  SCr 1.53 [10-29 @ 14:20]    Urinalysis - [10-29-20 @ 10:04]      Color Yellow / Appearance Clear / SG 1.005 / pH 6.5      Gluc 100 / Ketone Trace  / Bili Negative / Urobili Negative       Blood Small / Protein 100 / Leuk Est Negative / Nitrite Negative      RBC 0-2 / WBC 3-5 / Hyaline  / Gran  / Sq Epi  / Non Sq Epi Negative / Bacteria Negative      Iron 53, TIBC 266, %sat 20      [08-19-20 @ 06:32]  Ferritin 184      [08-19-20 @ 06:32]  PTH -- (Ca 9.6)      [08-19-20 @ 16:08]   91  Vitamin D (25OH) 26.4      [08-19-20 @ 16:08]  HbA1c 4.9      [08-09-18 @ 05:54]  TSH 2.16      [09-15-20 @ 02:01]    HBsAb <3.0      [06-25-20 @ 02:07]  HBsAg Nonreact      [06-25-20 @ 02:07]  HBcAb Nonreact      [06-25-20 @ 02:07]  HCV 0.11, Nonreact      [06-25-20 @ 02:07]

## 2020-11-13 NOTE — PROGRESS NOTE ADULT - ASSESSMENT
85 y/o male with PMHx of HTN, HLD, CAD, HFpEF, s/p Right CEA for Carotid artery disease, CKD5 on HD 2d/week via LUE fistula, s/p flecainide w/ ILR placed 6/2020, AS s/p TAVR, PAD initially presented 10/21 , s/p RLE s/p angioplasty with 3 stents placed to the rt SFA, peroneal and popliteal artery. Early am 10 /29/20 pt went into CHB w/ multiple pauses up to 9-12 seconds, initially had TVP in icu and  s/p leadless ppm, brought in here because of low blood count.       -Symptomatic anemia: Typed and screened, got 2 PRBCs units, follow up Hb is 7.8, will give one more unit of PRBC during HD, follows with Dr Baker, had workup done in the past and has been having recurrent transfusion and multiple Endoscopies and colonoscopies, GI has seen him, will follow, his occult blood is positive, might need to have capsular endoscopy as out patient, patient is going for Endoscopy today, seen and followed by GI, cleared by cardiology.  Hematology consult appreciated.       - Complete Heart block, s/p PPM placement recently, follow up with EP team.     - PAD s/p angioplasty and stent x3, vascualr team following, holding palvix and Eliquis, will resume once ok from GI, vascular surgery recommands atleast he should be on plavix.     - ESRD on dialysis, followed by nephrology team, HD session later today.     DVT prophylaxis: SCDs    Dispo: Likely today or tomorrow, PT eval

## 2020-11-13 NOTE — CHART NOTE - NSCHARTNOTEFT_GEN_A_CORE
Called by RN after pt trying to get OOB without assistance.  RN requested 1:1 for safety.  Reviewed chart, pt without hx of dementia, as per chart, been oriented since admission, tonight s/p EGD s/p HD, VSS.  Upon my arrival, pt is sleeping.  Informed RN to place pt on enhanced supervision for now and alert PA if change in mental status when pt wakes up. Called by RN after pt trying to get OOB without assistance.  RN requested 1:1 for safety.  Reviewed chart, pt without hx of dementia, as per chart, been oriented since admission, tonight s/p EGD s/p HD, VSS.  Upon my arrival, pt is sleeping.  Informed RN to place pt on enhanced supervision for now and alert PA if change in mental status when pt wakes up.    0115 Called by RN with pt's BP of 168/55 P 75.  Pt is s/p HD, asymptomatic, resting comfortably, in no distress.  Pt received Clonidine, Cardura, procardia XL @ 22:00 for /82.  BP is improving, due to pt's low DBP, will hold off on antihypertensive for now.  Monitor and escalate prn.

## 2020-11-13 NOTE — CONSULT NOTE ADULT - SUBJECTIVE AND OBJECTIVE BOX
[place] [facility]ARDIOVASCULAR - [facility], THE HEART CENTER                                   [address] [place] [address]                                                      PHONE: [phone]                                                         FAX: [phone]  [email or web address]  ---------------------------------------------------------------------------------------------------------------------------------    Reason for Consult:    HPI:  CHRISTIANO ELIZABETH is an 86y Male with HTN, HLD, CAD, HFpEF, s/p Right CEA for Carotid artery disease, ESRD on HD 2d/week via LUE fistula, s/p flecainide w/ ILR placed 6/2020, AS s/p TAVR, PAD with RLE revasc on 10/2020, on Eliquis and Plavix, PPM placed in Oct 2020, anemia requiring prior transfusions, presents to ED with daughter (who works here as a nurse manger), due to anemia.  Patient has had a few days of dark stools. He denies chest pain, SOB, or any other cardiovascular symptoms at this time. He denies palpitations, diaphoresis, or syncope.     PAST MEDICAL & SURGICAL HISTORY:  GI bleeding  due to ulcerated polyps and colonic AVMs    Aortic stenosis  - s/p valve replacement    ESRD on dialysis  HD on Mondays and Fridays    Risk factors for obstructive sleep apnea    Anemia    RICHARDS (dyspnea on exertion)    VT (ventricular tachycardia)    HTN (hypertension)    CAD (coronary artery disease)    Arrhythmia    AV block, 1st degree    DM (diabetes mellitus)  - resolved    S/P TAVR (transcatheter aortic valve replacement)    H/O carotid endarterectomy  Right    A-V fistula  left arm 5/2017    H/O angioplasty  2013,  no  intervention    H/O left knee surgery    H/O circumcision  at  age  65        Plavix (Hives)  Toprol-XL (Rash)      MEDICATIONS  (STANDING):  acetaminophen   Tablet .. 650 milliGRAM(s) Oral every 6 hours  atorvastatin 80 milliGRAM(s) Oral at bedtime  cloNIDine 0.1 milliGRAM(s) Oral at bedtime  doxazosin 1 milliGRAM(s) Oral at bedtime  DULoxetine 60 milliGRAM(s) Oral daily  hydrALAZINE  Oral Tab/Cap - Peds 50 milliGRAM(s) Oral two times a day before meals  isosorbide   mononitrate ER Tablet (IMDUR) 30 milliGRAM(s) Oral daily  multivitamin 1 Tablet(s) Oral daily  NIFEdipine XL 60 milliGRAM(s) Oral at bedtime  NIFEdipine XL 90 milliGRAM(s) Oral daily  pantoprazole  Injectable 40 milliGRAM(s) IV Push daily  sevelamer carbonate 800 milliGRAM(s) Oral three times a day with meals    MEDICATIONS  (PRN):  traMADol 25 milliGRAM(s) Oral every 6 hours PRN Moderate Pain (4 - 6)      Social History:  Cigarettes:    denies                Alcohol:     denies            Illicit Drug Abuse:  denies    Family History:  denies CAD, MI, CVA, or sudden death.    ROS: Negative other than as mentioned in HPI.    Vital Signs Last 24 Hrs  T(C): 36.4 (13 Nov 2020 08:42), Max: 36.9 (12 Nov 2020 23:39)  T(F): 97.6 (13 Nov 2020 08:42), Max: 98.4 (12 Nov 2020 23:39)  HR: 76 (13 Nov 2020 08:42) (76 - 94)  BP: 189/73 (13 Nov 2020 08:42) (134/60 - 189/73)  BP(mean): --  RR: 18 (13 Nov 2020 08:42) (18 - 18)  SpO2: 97% (13 Nov 2020 08:42) (95% - 100%)  ICU Vital Signs Last 24 Hrs  CHRISTIANO ELIZABETH  I&O's Detail    12 Nov 2020 07:01  -  13 Nov 2020 07:00  --------------------------------------------------------  IN:    PRBCs (Packed Red Blood Cells): 130 mL  Total IN: 130 mL    OUT:    Voided (mL): 500 mL  Total OUT: 500 mL    Total NET: -370 mL        I&O's Summary    12 Nov 2020 07:01  -  13 Nov 2020 07:00  --------------------------------------------------------  IN: 130 mL / OUT: 500 mL / NET: -370 mL      Drug Dosing Weight  CHRISTIANO ELIZABETH      PHYSICAL EXAM:  General: Appears well developed, well nourished alert and cooperative.  HEENT: Head; normocephalic, atraumatic.  Eyes: Pupils reactive, cornea wnl.  Neck: Supple, no nodes adenopathy, no NVD or carotid bruit or thyromegaly.  CARDIOVASCULAR: Normal S1 and S2, No murmur, rub, gallop or lift.   LUNGS: No rales, rhonchi or wheeze. Normal breath sounds bilaterally.  ABDOMEN: Soft, nontender without mass or organomegaly. bowel sounds normoactive.  EXTREMITIES: No clubbing, cyanosis or edema. Distal pulses wnl.   SKIN: warm and dry with normal turgor.  NEURO: Alert/oriented x 3/normal motor exam. No pathologic reflexes.    PSYCH: normal affect.        LABS:                        7.9    9.70  )-----------( 235      ( 13 Nov 2020 06:37 )             26.0     11-13    137  |  101  |  124.0<H>  ----------------------------<  141<H>  5.0   |  18.0<L>  |  5.19<H>    Ca    9.5      13 Nov 2020 06:37    TPro  5.4<L>  /  Alb  3.1<L>  /  TBili  0.2<L>  /  DBili  x   /  AST  18  /  ALT  10  /  AlkPhos  87  11-12    CHRISTIANO ELIZABETH  CARDIAC MARKERS ( 13 Nov 2020 06:37 )  x     / 0.13 ng/mL / x     / x     / x      CARDIAC MARKERS ( 12 Nov 2020 16:19 )  x     / 0.13 ng/mL / x     / x     / x          PT/INR - ( 13 Nov 2020 06:37 )   PT: 13.5 sec;   INR: 1.17 ratio         PTT - ( 12 Nov 2020 16:19 )  PTT:34.4 sec      RADIOLOGY & ADDITIONAL STUDIES:    INTERPRETATION OF TELEMETRY (personally reviewed): sinus rhythm with occasional PVCS    ECG: sinus rhythm 85 bpm, normal axis, 1st degree AV delay, no ischemic ST abnormality    ECHO 9/18/20  1. Patient tachycardic during the entire study.   2. Mildly enlarged left atrium.   3. There is mild concentric left ventricular hypertrophy.   4. Hyperdyanmic wall motion. Left ventricular ejection fraction, by visual estimation, is 60 to 65%.   5. The right atrium is normal in size.   6. Normal right ventricular size and function.   7. Bioprosthetic aortic valve visualized. Likely Padilla JENN. well seated valve. Acceptable gradients. No regurgitation.   8. There is no evidence of pericardial effusion.    Assessment and Plan:  In summary, CHRISTIANO ELIZABETH is an 86y Male with past medical history significant for HTN, HLD, CAD, HFpEF, s/p Right CEA for Carotid artery disease, ESRD on HD 2d/week via LUE fistula, s/p flecainide w/ ILR placed 6/2020, AS s/p TAVR, PAD with RLE revasc on 10/2020, on Eliquis and Plavix, PPM placed in Oct 2020, anemia requiring prior transfusions, presents to ED with daughter (who works here as a nurse manger), due to anemia.  Patient has had a few days of dark stools. He denies chest pain, SOB, or any other cardiovascular symptoms at this time.    Pre-Operative Cardiovascular Risk Assessment  - the patient denies cardiac symptoms and there are no active severe cardiac conditions at this time. Based on his medical history and functional status, the patient is moderate/high risk for monik-procedural cardiovascular complications for a low risk EGD, but is currently optimized and can proceed without further cardiac work up.  - it is okay to hold Eliquis and Plavix for now until GI workup is complete  - transfuse as needed

## 2020-11-14 ENCOUNTER — TRANSCRIPTION ENCOUNTER (OUTPATIENT)
Age: 85
End: 2020-11-14

## 2020-11-14 VITALS — SYSTOLIC BLOOD PRESSURE: 142 MMHG | DIASTOLIC BLOOD PRESSURE: 60 MMHG

## 2020-11-14 DIAGNOSIS — K25.3 ACUTE GASTRIC ULCER WITHOUT HEMORRHAGE OR PERFORATION: ICD-10-CM

## 2020-11-14 LAB
HCT VFR BLD CALC: 28.9 % — LOW (ref 39–50)
HGB BLD-MCNC: 9 G/DL — LOW (ref 13–17)
MCHC RBC-ENTMCNC: 27.9 PG — SIGNIFICANT CHANGE UP (ref 27–34)
MCHC RBC-ENTMCNC: 31.1 GM/DL — LOW (ref 32–36)
MCV RBC AUTO: 89.5 FL — SIGNIFICANT CHANGE UP (ref 80–100)
PLATELET # BLD AUTO: 215 K/UL — SIGNIFICANT CHANGE UP (ref 150–400)
RBC # BLD: 3.23 M/UL — LOW (ref 4.2–5.8)
RBC # FLD: 20.6 % — HIGH (ref 10.3–14.5)
WBC # BLD: 8.63 K/UL — SIGNIFICANT CHANGE UP (ref 3.8–10.5)
WBC # FLD AUTO: 8.63 K/UL — SIGNIFICANT CHANGE UP (ref 3.8–10.5)

## 2020-11-14 PROCEDURE — 86901 BLOOD TYPING SEROLOGIC RH(D): CPT

## 2020-11-14 PROCEDURE — 80053 COMPREHEN METABOLIC PANEL: CPT

## 2020-11-14 PROCEDURE — 99233 SBSQ HOSP IP/OBS HIGH 50: CPT

## 2020-11-14 PROCEDURE — 85730 THROMBOPLASTIN TIME PARTIAL: CPT

## 2020-11-14 PROCEDURE — 86850 RBC ANTIBODY SCREEN: CPT

## 2020-11-14 PROCEDURE — 36415 COLL VENOUS BLD VENIPUNCTURE: CPT

## 2020-11-14 PROCEDURE — 93005 ELECTROCARDIOGRAM TRACING: CPT

## 2020-11-14 PROCEDURE — 80048 BASIC METABOLIC PNL TOTAL CA: CPT

## 2020-11-14 PROCEDURE — 86923 COMPATIBILITY TEST ELECTRIC: CPT

## 2020-11-14 PROCEDURE — C1889: CPT

## 2020-11-14 PROCEDURE — 99261: CPT

## 2020-11-14 PROCEDURE — 85025 COMPLETE CBC W/AUTO DIFF WBC: CPT

## 2020-11-14 PROCEDURE — 84484 ASSAY OF TROPONIN QUANT: CPT

## 2020-11-14 PROCEDURE — 85027 COMPLETE CBC AUTOMATED: CPT

## 2020-11-14 PROCEDURE — 86769 SARS-COV-2 COVID-19 ANTIBODY: CPT

## 2020-11-14 PROCEDURE — 99285 EMERGENCY DEPT VISIT HI MDM: CPT | Mod: 25

## 2020-11-14 PROCEDURE — 0225U NFCT DS DNA&RNA 21 SARSCOV2: CPT

## 2020-11-14 PROCEDURE — 99232 SBSQ HOSP IP/OBS MODERATE 35: CPT

## 2020-11-14 PROCEDURE — 36430 TRANSFUSION BLD/BLD COMPNT: CPT

## 2020-11-14 PROCEDURE — P9040: CPT

## 2020-11-14 PROCEDURE — 71045 X-RAY EXAM CHEST 1 VIEW: CPT

## 2020-11-14 PROCEDURE — 85610 PROTHROMBIN TIME: CPT

## 2020-11-14 PROCEDURE — 86900 BLOOD TYPING SEROLOGIC ABO: CPT

## 2020-11-14 PROCEDURE — 82272 OCCULT BLD FECES 1-3 TESTS: CPT

## 2020-11-14 RX ADMIN — Medication 650 MILLIGRAM(S): at 05:54

## 2020-11-14 RX ADMIN — Medication 1 GRAM(S): at 05:54

## 2020-11-14 RX ADMIN — DULOXETINE HYDROCHLORIDE 60 MILLIGRAM(S): 30 CAPSULE, DELAYED RELEASE ORAL at 08:59

## 2020-11-14 RX ADMIN — Medication 90 MILLIGRAM(S): at 05:54

## 2020-11-14 RX ADMIN — Medication 1 TABLET(S): at 08:59

## 2020-11-14 RX ADMIN — PANTOPRAZOLE SODIUM 40 MILLIGRAM(S): 20 TABLET, DELAYED RELEASE ORAL at 05:54

## 2020-11-14 RX ADMIN — ISOSORBIDE MONONITRATE 30 MILLIGRAM(S): 60 TABLET, EXTENDED RELEASE ORAL at 08:59

## 2020-11-14 RX ADMIN — Medication 50 MILLIGRAM(S): at 09:24

## 2020-11-14 RX ADMIN — SEVELAMER CARBONATE 800 MILLIGRAM(S): 2400 POWDER, FOR SUSPENSION ORAL at 08:59

## 2020-11-14 RX ADMIN — SEVELAMER CARBONATE 800 MILLIGRAM(S): 2400 POWDER, FOR SUSPENSION ORAL at 12:00

## 2020-11-14 RX ADMIN — Medication 650 MILLIGRAM(S): at 11:59

## 2020-11-14 NOTE — PROGRESS NOTE ADULT - PROBLEM SELECTOR PLAN 1
- small antral ulcer- non bleeding. Clipped  hgb stable  Pt to start plavix- Has hx of chronic anemia Thought to be due to possible AVM of the small bowel. Will be high risk  for bleeding. Stop plavix when vascular says ok to stop  may D/C from GI standpoint. Will sign off  protonix bid and carafate qid. F/U samson Bryson in 1 month

## 2020-11-14 NOTE — PROGRESS NOTE ADULT - ASSESSMENT
1) ESRD on HD  2) MBD of renal dx  3) Anemia of renal dx; ?bleed GI  4) Vol HTN      DC planning  Home hemodialysis under Dr Avendano's care;  Next HD Monday at home;    jake Carballo

## 2020-11-14 NOTE — PROGRESS NOTE ADULT - PROBLEM SELECTOR PROBLEM 1
Acute gastric ulcer without hemorrhage or perforation <<----- Click to add NO significant Past Surgical History

## 2020-11-14 NOTE — PROGRESS NOTE ADULT - SUBJECTIVE AND OBJECTIVE BOX
Patient is a 86y old  Male who presents with a chief complaint of Low blood count (14 Nov 2020 10:20)      HPI:  85 y/o male with PMHx of HTN, HLD, CAD, HFpEF, s/p Right CEA for Carotid artery disease, ESRD on HD 2d/week via LUE fistula, s/p flecainide w/ ILR placed 6/2020, AS s/p TAVR, PAD with RLE revasc on 10/2020, on Eliquis and Plavix, anemia requiring prior transfusions,        Patient had no complaints of abdominal pain.  Hgb 9.1. Tolerating solid diet.        REVIEW OF SYSTEMS:  Constitutional: No fever, weight loss or fatigue  ENMT:  No difficulty hearing, tinnitus, vertigo; No sinus or throat pain  Respiratory: No cough, wheezing, chills or hemoptysis  Cardiovascular: No chest pain, palpitations, dizziness or leg swelling  Gastrointestinal: No abdominal or epigastric pain. No nausea, vomiting or hematemesis; No diarrhea or constipation. No melena or hematochezia.  Skin: No itching, burning, rashes or lesions   Musculoskeletal: No joint pain or swelling; No muscle, back or extremity pain    PAST MEDICAL & SURGICAL HISTORY:  GI bleeding  due to ulcerated polyps and colonic AVMs    Aortic stenosis  - s/p valve replacement    ESRD on dialysis  HD on Mondays and Fridays    Risk factors for obstructive sleep apnea    Anemia    RICHARDS (dyspnea on exertion)    VT (ventricular tachycardia)    HTN (hypertension)    CAD (coronary artery disease)    Arrhythmia    AV block, 1st degree    DM (diabetes mellitus)  - resolved    S/P TAVR (transcatheter aortic valve replacement)    H/O carotid endarterectomy  Right    A-V fistula  left arm 5/2017    H/O angioplasty  2013,  no  intervention    H/O left knee surgery    H/O circumcision  at  age  65        FAMILY HISTORY:  FH: type 2 diabetes    Family history of premature CAD    Family history of lung cancer (Grandparent)    Family history of cancer (Grandparent)        SOCIAL HISTORY:  Smoking Status: [ ] Current, [ ] Former, [ ] Never  Pack Years:  [  ] EtOH-no  [  ] IVDA    MEDICATIONS:  MEDICATIONS  (STANDING):  acetaminophen   Tablet .. 650 milliGRAM(s) Oral every 6 hours  atorvastatin 80 milliGRAM(s) Oral at bedtime  cloNIDine 0.1 milliGRAM(s) Oral at bedtime  doxazosin 1 milliGRAM(s) Oral at bedtime  DULoxetine 60 milliGRAM(s) Oral daily  hydrALAZINE  Oral Tab/Cap - Peds 50 milliGRAM(s) Oral two times a day before meals  isosorbide   mononitrate ER Tablet (IMDUR) 30 milliGRAM(s) Oral daily  multivitamin 1 Tablet(s) Oral daily  NIFEdipine XL 60 milliGRAM(s) Oral at bedtime  NIFEdipine XL 90 milliGRAM(s) Oral daily  pantoprazole  Injectable 40 milliGRAM(s) IV Push two times a day  sevelamer carbonate 800 milliGRAM(s) Oral three times a day with meals  sucralfate 1 Gram(s) Oral two times a day    MEDICATIONS  (PRN):      Allergies    Plavix (Hives)  Toprol-XL (Rash)    Intolerances        Vital Signs Last 24 Hrs  T(C): 36.6 (14 Nov 2020 10:10), Max: 37.1 (13 Nov 2020 17:45)  T(F): 97.8 (14 Nov 2020 10:10), Max: 98.8 (13 Nov 2020 17:45)  HR: 68 (14 Nov 2020 10:10) (68 - 91)  BP: 136/50 (14 Nov 2020 10:10) (136/50 - 176/82)  BP(mean): --  RR: 18 (14 Nov 2020 10:10) (18 - 18)  SpO2: 100% (14 Nov 2020 10:10) (96% - 100%)    11-13 @ 07:01  -  11-14 @ 07:00  --------------------------------------------------------  IN: 600 mL / OUT: 2400 mL / NET: -1800 mL          PHYSICAL EXAM:    General: elderly , frail   HEENT: MMM, conjunctiva and sclera clear  Gastrointestinal: Soft, non-tender non-distended; Normal bowel sounds; No rebound or guarding  Extremities: Normal range of motion, No clubbing, cyanosis or edema  Neurological: Alert and oriented x3  Skin: Warm and dry. No obvious rash      LABS:                        9.0    8.63  )-----------( 215      ( 14 Nov 2020 10:05 )             28.9     13 Nov 2020 06:37    137    |  101    |  124.0  ----------------------------<  141    5.0     |  18.0   |  5.19     Ca    9.5        13 Nov 2020 06:37    TPro  5.4    /  Alb  3.1    /  TBili  0.2    /  DBili  x      /  AST  18     /  ALT  10     /  AlkPhos  87     / Amylase x      /Lipase x      12 Nov 2020 16:19              RADIOLOGY & ADDITIONAL STUDIES:

## 2020-11-14 NOTE — PROGRESS NOTE ADULT - SUBJECTIVE AND OBJECTIVE BOX
[facility] DIVISION OF KIDNEY DISEASES AND HYPERTENSION -- FOLLOW UP NOTE  --------------------------------------------------------------------------------  Chief Complaint:  ESRD HD  24 hour events/subjective:  Seen/examined this AM  sp PRBC; procrit;; EGD;  Dialyzed yesterday; tolerated 1.5 L UF;      PAST HISTORY  --------------------------------------------------------------------------------  No significant changes to PMH, PSH, FHx, SHx, unless otherwise noted    ALLERGIES & MEDICATIONS  --------------------------------------------------------------------------------  Allergies    Plavix (Hives)  Toprol-XL (Rash)    Intolerances      Standing Inpatient Medications  acetaminophen   Tablet .. 650 milliGRAM(s) Oral every 6 hours  atorvastatin 80 milliGRAM(s) Oral at bedtime  cloNIDine 0.1 milliGRAM(s) Oral at bedtime  doxazosin 1 milliGRAM(s) Oral at bedtime  DULoxetine 60 milliGRAM(s) Oral daily  hydrALAZINE  Oral Tab/Cap - Peds 50 milliGRAM(s) Oral two times a day before meals  isosorbide   mononitrate ER Tablet (IMDUR) 30 milliGRAM(s) Oral daily  multivitamin 1 Tablet(s) Oral daily  NIFEdipine XL 60 milliGRAM(s) Oral at bedtime  NIFEdipine XL 90 milliGRAM(s) Oral daily  pantoprazole  Injectable 40 milliGRAM(s) IV Push two times a day  sevelamer carbonate 800 milliGRAM(s) Oral three times a day with meals  sucralfate 1 Gram(s) Oral two times a day    PRN Inpatient Medications      REVIEW OF SYSTEMS  --------------------------------------------------------------------------------  Gen: No weight changes, fatigue, fevers/chills, weakness  Skin: No rashes  Head/Eyes/Ears/Mouth: No headache; Normal hearing; Normal vision w/o blurriness; No sinus pain/discomfort, sore throat  Respiratory: No dyspnea, cough, wheezing, hemoptysis  CV: No chest pain, PND, orthopnea  GI: No abdominal pain, diarrhea, constipation, nausea, vomiting, melena, hematochezia  : No increased frequency, dysuria, hematuria, nocturia  MSK: No joint pain/swelling; no back pain; no edema  Neuro: No dizziness/lightheadedness, weakness, seizures, numbness, tingling  Heme: No easy bruising or bleeding  Endo: No heat/cold intolerance  Psych: No significant nervousness, anxiety, stress, depression    All other systems were reviewed and are negative, except as noted.    VITALS/PHYSICAL EXAM  --------------------------------------------------------------------------------  T(C): 36.6 (11-14-20 @ 10:10), Max: 37.1 (11-13-20 @ 17:45)  HR: 68 (11-14-20 @ 10:10) (68 - 91)  BP: 136/50 (11-14-20 @ 10:10) (136/50 - 176/82)  RR: 18 (11-14-20 @ 10:10) (18 - 18)  SpO2: 100% (11-14-20 @ 10:10) (96% - 100%)  Wt(kg): --  Height (cm): 165.1 (11-12-20 @ 15:04)  Weight (kg): 66.7 (11-12-20 @ 15:04)  BMI (kg/m2): 24.5 (11-12-20 @ 15:04)  BSA (m2): 1.74 (11-12-20 @ 15:04)      11-13-20 @ 07:01  -  11-14-20 @ 07:00  --------------------------------------------------------  IN: 600 mL / OUT: 2400 mL / NET: -1800 mL      Physical Exam:  	Gen: NAD,   	HEENT: PERRL, supple neck, clear oropharynx  	Pulm: CTA B/L  	CV: RRR, S1S2; no rub  	Back: No spinal or CVA tenderness; no sacral edema  	Abd: +BS, soft, nontender/nondistended  	: No suprapubic tenderness  	UE: Warm, FROM, no clubbing, intact strength; no edema; no asterixis  	LE: Warm, FROM, no clubbing, intact strength; no edema  	Neuro: No focal deficits, intact gait  	Psych: Normal affect and mood  	Skin: Warm, without rashes  	Vascular access:    LABS/STUDIES  --------------------------------------------------------------------------------              9.0    8.63  >-----------<  215      [11-14-20 @ 10:05]              28.9     137  |  101  |  124.0  ----------------------------<  141      [11-13-20 @ 06:37]  5.0   |  18.0  |  5.19        Ca     9.5     [11-13-20 @ 06:37]    TPro  5.4  /  Alb  3.1  /  TBili  0.2  /  DBili  x   /  AST  18  /  ALT  10  /  AlkPhos  87  [11-12-20 @ 16:19]    PT/INR: PT 13.5 , INR 1.17       [11-13-20 @ 06:37]  PTT: 34.4       [11-12-20 @ 16:19]    Troponin 0.13      [11-13-20 @ 06:37]    Creatinine Trend:  SCr 5.19 [11-13 @ 06:37]  SCr 5.16 [11-12 @ 16:19]  SCr 4.54 [10-30 @ 06:03]  SCr 3.69 [10-29 @ 15:32]  SCr 1.53 [10-29 @ 14:20]    Urinalysis - [10-29-20 @ 10:04]      Color Yellow / Appearance Clear / SG 1.005 / pH 6.5      Gluc 100 / Ketone Trace  / Bili Negative / Urobili Negative       Blood Small / Protein 100 / Leuk Est Negative / Nitrite Negative      RBC 0-2 / WBC 3-5 / Hyaline  / Gran  / Sq Epi  / Non Sq Epi Negative / Bacteria Negative      Iron 53, TIBC 266, %sat 20      [08-19-20 @ 06:32]  Ferritin 184      [08-19-20 @ 06:32]  PTH -- (Ca 9.6)      [08-19-20 @ 16:08]   91  Vitamin D (25OH) 26.4      [08-19-20 @ 16:08]  HbA1c 4.9      [08-09-18 @ 05:54]  TSH 2.16      [09-15-20 @ 02:01]

## 2020-11-14 NOTE — DISCHARGE NOTE PROVIDER - HOSPITAL COURSE
85 y/o male with PMHx of HTN, HLD, CAD, HFpEF, s/p Right CEA for Carotid artery disease, CKD5 on HD 2d/week via LUE fistula, s/p flecainide w/ ILR placed 6/2020, AS s/p TAVR, PAD initially presented 10/21 , s/p RLE s/p angioplasty with 3 stents placed to the rt SFA, peroneal and popliteal artery. Early am 10 /29/20 pt went into [facility] w/ multiple pauses up to 9-12 seconds, initially had TVP in icu and s/p leadless ppm, brought in here because of low blood count.     Symptomatic anemia:  s/p EGD with non bleeding small antral ulcer  - will restart plavix   - Hgb stable     Complete Heart block  -s/p PPM placement     PAD s/p angioplasty and stent  - c.w plavix. Holding off on eliquis    - ESRD on dialysis

## 2020-11-14 NOTE — DISCHARGE NOTE PROVIDER - NSDCFUSCHEDAPPT_GEN_ALL_CORE_FT
PROETTA, CHRISTIANO ; 11/19/2020 ; [facility]  PROETTA, CHRISTIANO ; 11/25/2020 ; [facility]  PROETTA, CHRISTIANO ; 11/25/2020 ; [facility]  PROETTA, CHRISTIANO ; 11/30/2020 ; [facility] Card[facility] [address]  PROETTA, CHRISTIANO ; 12/01/2020 ; [facility] V[facility] [address]  PROETTA, CHRISTIANO ; 12/03/2020 ; [facility]  PROETTA, CHRISTIANO ; 12/10/2020 ; [facility]  PROETTA, CHRISTIANO ; 12/10/2020 ; [facility]  PROETTA, CHRISTIANO ; 12/17/2020 ; [facility]  PROETTA, CHRISTIANO ; 12/22/2020 ; [facility] Neurology [address]  PROETTA, CHRISTIANO ; 01/04/2021 ; [facility] [address]

## 2020-11-14 NOTE — DISCHARGE NOTE PROVIDER - NSDCMRMEDTOKEN_GEN_ALL_CORE_FT
acetaminophen 325 mg oral tablet: 2 tab(s) orally every 6 hours, As needed, Mild Pain (1 - 3)  atorvastatin 80 mg oral tablet: 1 tab(s) orally once a day  cloNIDine 0.1 mg oral tablet: orally once a day (at bedtime)  clopidogrel 75 mg oral tablet: 1 tab(s) orally once a day  Cymbalta 60 mg oral delayed release capsule: 1 cap(s) orally once a day  doxazosin 1 mg oral tablet: 1 tab(s) orally once a day (at bedtime)  hydrALAZINE 50 mg oral tablet: 1 tab(s) orally 2 times a day (before meals)  isosorbide mononitrate 30 mg oral tablet, extended release: 1 tab(s) orally once a day  Nephro-Thomas oral tablet: 1 tab(s) orally once a day  NIFEdipine 60 mg oral tablet, extended release: 1 tab(s) orally once (at bedtime)  NIFEdipine 90 mg oral tablet, extended release: 1 tab(s) orally once a day   Protonix 40 mg oral delayed release tablet: 1 tab(s) orally once a day   sevelamer carbonate 800 mg oral tablet: 1 tab(s) orally 3 times a day (with meals)  torsemide 20 mg oral tablet: 1 tab(s) orally once a day  traMADol 50 mg oral tablet: 0.5 tab(s) orally every 6 hours, As Needed -for moderate pain MDD:4  traMADol 50 mg oral tablet: 0.5 tab(s) orally every 6 hours, As Needed -for moderate pain MDD:2 full tabs

## 2020-11-14 NOTE — DISCHARGE NOTE NURSING/CASE MANAGEMENT/SOCIAL WORK - PATIENT PORTAL LINK FT
You can access the FollowMyHealth Patient Portal offered by [facility] by registering at the following website: [email or web address]. By joining [employer]’s FollowMyHealth portal, you will also be able to view your health information using other applications (apps) compatible with our system.

## 2020-11-14 NOTE — DISCHARGE NOTE PROVIDER - NSDCCPCAREPLAN_GEN_ALL_CORE_FT
PRINCIPAL DISCHARGE DIAGNOSIS  Diagnosis: Anemia  Assessment and Plan of Treatment:       SECONDARY DISCHARGE DIAGNOSES  Diagnosis: ESRD on dialysis  Assessment and Plan of Treatment: HD on Mondays and Fridays    Diagnosis: GI bleeding  Assessment and Plan of Treatment: due to ulcerated polyps and colonic AVMs

## 2020-11-14 NOTE — DISCHARGE NOTE PROVIDER - NSDCHHNEEDSERVICE_GEN_ALL_CORE
Medication teaching and assessment/Observation and assessment/Wound care and assessment/Teaching and training

## 2020-11-19 ENCOUNTER — APPOINTMENT (OUTPATIENT)
Dept: HEMATOLOGY ONCOLOGY | Facility: CLINIC | Age: 85
End: 2020-11-19

## 2020-11-19 ENCOUNTER — APPOINTMENT (OUTPATIENT)
Age: 85
End: 2020-11-19

## 2020-11-19 ENCOUNTER — RESULT REVIEW (OUTPATIENT)
Age: 85
End: 2020-11-19

## 2020-11-19 LAB
BASOPHILS # BLD AUTO: 0.1 K/UL — SIGNIFICANT CHANGE UP (ref 0–0.2)
BASOPHILS NFR BLD AUTO: 1.9 % — SIGNIFICANT CHANGE UP (ref 0–2)
EOSINOPHIL # BLD AUTO: 0.2 K/UL — SIGNIFICANT CHANGE UP (ref 0–0.5)
EOSINOPHIL NFR BLD AUTO: 3.7 % — SIGNIFICANT CHANGE UP (ref 0–6)
HCT VFR BLD CALC: 28.8 % — LOW (ref 39–50)
HGB BLD-MCNC: 8.6 G/DL — LOW (ref 13–17)
LYMPHOCYTES # BLD AUTO: 0.6 K/UL — LOW (ref 1–3.3)
LYMPHOCYTES # BLD AUTO: 9.6 % — LOW (ref 13–44)
MCHC RBC-ENTMCNC: 27.6 PG — SIGNIFICANT CHANGE UP (ref 27–34)
MCHC RBC-ENTMCNC: 29.9 G/DL — LOW (ref 32–36)
MCV RBC AUTO: 92.5 FL — SIGNIFICANT CHANGE UP (ref 80–100)
MONOCYTES # BLD AUTO: 0.6 K/UL — SIGNIFICANT CHANGE UP (ref 0–0.9)
MONOCYTES NFR BLD AUTO: 9.9 % — SIGNIFICANT CHANGE UP (ref 2–14)
NEUTROPHILS # BLD AUTO: 4.4 K/UL — SIGNIFICANT CHANGE UP (ref 1.8–7.4)
NEUTROPHILS NFR BLD AUTO: 74.8 % — SIGNIFICANT CHANGE UP (ref 43–77)
PLATELET # BLD AUTO: 237 K/UL — SIGNIFICANT CHANGE UP (ref 150–400)
RBC # BLD: 3.12 M/UL — LOW (ref 4.2–5.8)
RBC # FLD: 16.9 % — HIGH (ref 10.3–14.5)
WBC # BLD: 5.9 K/UL — SIGNIFICANT CHANGE UP (ref 3.8–10.5)
WBC # FLD AUTO: 5.9 K/UL — SIGNIFICANT CHANGE UP (ref 3.8–10.5)

## 2020-11-23 ENCOUNTER — NON-APPOINTMENT (OUTPATIENT)
Age: 85
End: 2020-11-23

## 2020-11-24 ENCOUNTER — APPOINTMENT (OUTPATIENT)
Dept: HEMATOLOGY ONCOLOGY | Facility: CLINIC | Age: 85
End: 2020-11-24

## 2020-11-25 ENCOUNTER — APPOINTMENT (OUTPATIENT)
Age: 85
End: 2020-11-25

## 2020-11-25 ENCOUNTER — APPOINTMENT (OUTPATIENT)
Dept: HEMATOLOGY ONCOLOGY | Facility: CLINIC | Age: 85
End: 2020-11-25

## 2020-11-30 ENCOUNTER — OUTPATIENT (OUTPATIENT)
Dept: OUTPATIENT SERVICES | Facility: HOSPITAL | Age: 85
LOS: 1 days | Discharge: HOME | End: 2020-11-30

## 2020-11-30 ENCOUNTER — APPOINTMENT (OUTPATIENT)
Dept: ELECTROPHYSIOLOGY | Facility: CLINIC | Age: 85
End: 2020-11-30
Payer: MEDICARE

## 2020-11-30 DIAGNOSIS — Z98.890 OTHER SPECIFIED POSTPROCEDURAL STATES: Chronic | ICD-10-CM

## 2020-11-30 DIAGNOSIS — D63.1 ANEMIA IN CHRONIC KIDNEY DISEASE: ICD-10-CM

## 2020-11-30 DIAGNOSIS — I77.0 ARTERIOVENOUS FISTULA, ACQUIRED: Chronic | ICD-10-CM

## 2020-11-30 DIAGNOSIS — Z95.2 PRESENCE OF PROSTHETIC HEART VALVE: Chronic | ICD-10-CM

## 2020-11-30 DIAGNOSIS — Z98.62 PERIPHERAL VASCULAR ANGIOPLASTY STATUS: Chronic | ICD-10-CM

## 2020-11-30 PROCEDURE — G2066: CPT

## 2020-11-30 PROCEDURE — 93298 REM INTERROG DEV EVAL SCRMS: CPT

## 2020-12-01 ENCOUNTER — APPOINTMENT (OUTPATIENT)
Dept: VASCULAR SURGERY | Facility: CLINIC | Age: 85
End: 2020-12-01
Payer: MEDICARE

## 2020-12-01 DIAGNOSIS — I73.9 PERIPHERAL VASCULAR DISEASE, UNSPECIFIED: ICD-10-CM

## 2020-12-02 PROBLEM — I73.9 CLAUDICATION OF RIGHT LOWER EXTREMITY: Status: ACTIVE | Noted: 2020-05-19

## 2020-12-03 ENCOUNTER — APPOINTMENT (OUTPATIENT)
Dept: HEMATOLOGY ONCOLOGY | Facility: CLINIC | Age: 85
End: 2020-12-03

## 2020-12-03 ENCOUNTER — APPOINTMENT (OUTPATIENT)
Age: 85
End: 2020-12-03

## 2020-12-03 ENCOUNTER — RESULT REVIEW (OUTPATIENT)
Age: 85
End: 2020-12-03

## 2020-12-03 LAB
BASOPHILS # BLD AUTO: 0.1 K/UL — SIGNIFICANT CHANGE UP (ref 0–0.2)
BASOPHILS NFR BLD AUTO: 2.3 % — HIGH (ref 0–2)
EOSINOPHIL # BLD AUTO: 0.3 K/UL — SIGNIFICANT CHANGE UP (ref 0–0.5)
EOSINOPHIL NFR BLD AUTO: 4.1 % — SIGNIFICANT CHANGE UP (ref 0–6)
HCT VFR BLD CALC: 31.9 % — LOW (ref 39–50)
HGB BLD-MCNC: 9.6 G/DL — LOW (ref 13–17)
LYMPHOCYTES # BLD AUTO: 0.7 K/UL — LOW (ref 1–3.3)
LYMPHOCYTES # BLD AUTO: 11 % — LOW (ref 13–44)
MCHC RBC-ENTMCNC: 28.1 PG — SIGNIFICANT CHANGE UP (ref 27–34)
MCHC RBC-ENTMCNC: 30.2 G/DL — LOW (ref 32–36)
MCV RBC AUTO: 93.3 FL — SIGNIFICANT CHANGE UP (ref 80–100)
MONOCYTES # BLD AUTO: 0.6 K/UL — SIGNIFICANT CHANGE UP (ref 0–0.9)
MONOCYTES NFR BLD AUTO: 9 % — SIGNIFICANT CHANGE UP (ref 2–14)
NEUTROPHILS # BLD AUTO: 4.6 K/UL — SIGNIFICANT CHANGE UP (ref 1.8–7.4)
NEUTROPHILS NFR BLD AUTO: 73.6 % — SIGNIFICANT CHANGE UP (ref 43–77)
PLATELET # BLD AUTO: 186 K/UL — SIGNIFICANT CHANGE UP (ref 150–400)
RBC # BLD: 3.42 M/UL — LOW (ref 4.2–5.8)
RBC # FLD: 18.5 % — HIGH (ref 10.3–14.5)
WBC # BLD: 6.3 K/UL — SIGNIFICANT CHANGE UP (ref 3.8–10.5)
WBC # FLD AUTO: 6.3 K/UL — SIGNIFICANT CHANGE UP (ref 3.8–10.5)

## 2020-12-04 DIAGNOSIS — N18.4 CHRONIC KIDNEY DISEASE, STAGE 4 (SEVERE): ICD-10-CM

## 2020-12-08 ENCOUNTER — APPOINTMENT (OUTPATIENT)
Dept: VASCULAR SURGERY | Facility: CLINIC | Age: 85
End: 2020-12-08
Payer: MEDICARE

## 2020-12-08 VITALS
DIASTOLIC BLOOD PRESSURE: 62 MMHG | HEART RATE: 81 BPM | OXYGEN SATURATION: 91 % | SYSTOLIC BLOOD PRESSURE: 185 MMHG | TEMPERATURE: 97.8 F | WEIGHT: 148 LBS | HEIGHT: 65 IN | BODY MASS INDEX: 24.66 KG/M2

## 2020-12-08 DIAGNOSIS — I89.0 LYMPHEDEMA, NOT ELSEWHERE CLASSIFIED: ICD-10-CM

## 2020-12-08 PROCEDURE — 99024 POSTOP FOLLOW-UP VISIT: CPT

## 2020-12-08 RX ORDER — TICAGRELOR 60 MG/1
60 TABLET ORAL
Refills: 0 | Status: COMPLETED | COMMUNITY
End: 2020-12-08

## 2020-12-10 ENCOUNTER — RESULT REVIEW (OUTPATIENT)
Age: 85
End: 2020-12-10

## 2020-12-10 ENCOUNTER — APPOINTMENT (OUTPATIENT)
Dept: HEMATOLOGY ONCOLOGY | Facility: CLINIC | Age: 85
End: 2020-12-10

## 2020-12-10 ENCOUNTER — APPOINTMENT (OUTPATIENT)
Age: 85
End: 2020-12-10

## 2020-12-10 LAB
BASOPHILS # BLD AUTO: 0.1 K/UL — SIGNIFICANT CHANGE UP (ref 0–0.2)
BASOPHILS NFR BLD AUTO: 1.5 % — SIGNIFICANT CHANGE UP (ref 0–2)
EOSINOPHIL # BLD AUTO: 0.2 K/UL — SIGNIFICANT CHANGE UP (ref 0–0.5)
EOSINOPHIL NFR BLD AUTO: 2.5 % — SIGNIFICANT CHANGE UP (ref 0–6)
HCT VFR BLD CALC: 35.4 % — LOW (ref 39–50)
HGB BLD-MCNC: 10.4 G/DL — LOW (ref 13–17)
LYMPHOCYTES # BLD AUTO: 0.5 K/UL — LOW (ref 1–3.3)
LYMPHOCYTES # BLD AUTO: 5.5 % — LOW (ref 13–44)
MCHC RBC-ENTMCNC: 27.6 PG — SIGNIFICANT CHANGE UP (ref 27–34)
MCHC RBC-ENTMCNC: 29.5 G/DL — LOW (ref 32–36)
MCV RBC AUTO: 93.8 FL — SIGNIFICANT CHANGE UP (ref 80–100)
MONOCYTES # BLD AUTO: 0.7 K/UL — SIGNIFICANT CHANGE UP (ref 0–0.9)
MONOCYTES NFR BLD AUTO: 8.2 % — SIGNIFICANT CHANGE UP (ref 2–14)
NEUTROPHILS # BLD AUTO: 7 K/UL — SIGNIFICANT CHANGE UP (ref 1.8–7.4)
NEUTROPHILS NFR BLD AUTO: 82.3 % — HIGH (ref 43–77)
PLATELET # BLD AUTO: 208 K/UL — SIGNIFICANT CHANGE UP (ref 150–400)
RBC # BLD: 3.78 M/UL — LOW (ref 4.2–5.8)
RBC # FLD: 17.9 % — HIGH (ref 10.3–14.5)
WBC # BLD: 8.5 K/UL — SIGNIFICANT CHANGE UP (ref 3.8–10.5)
WBC # FLD AUTO: 8.5 K/UL — SIGNIFICANT CHANGE UP (ref 3.8–10.5)

## 2020-12-17 ENCOUNTER — APPOINTMENT (OUTPATIENT)
Dept: HEMATOLOGY ONCOLOGY | Facility: CLINIC | Age: 85
End: 2020-12-17

## 2020-12-17 ENCOUNTER — APPOINTMENT (OUTPATIENT)
Age: 85
End: 2020-12-17

## 2020-12-18 ENCOUNTER — RESULT REVIEW (OUTPATIENT)
Age: 85
End: 2020-12-18

## 2020-12-18 ENCOUNTER — APPOINTMENT (OUTPATIENT)
Age: 85
End: 2020-12-18

## 2020-12-18 LAB
BASOPHILS # BLD AUTO: 0.2 K/UL — SIGNIFICANT CHANGE UP (ref 0–0.2)
BASOPHILS NFR BLD AUTO: 2 % — SIGNIFICANT CHANGE UP (ref 0–2)
EOSINOPHIL # BLD AUTO: 0.3 K/UL — SIGNIFICANT CHANGE UP (ref 0–0.5)
EOSINOPHIL NFR BLD AUTO: 3.3 % — SIGNIFICANT CHANGE UP (ref 0–6)
HCT VFR BLD CALC: 33.2 % — LOW (ref 39–50)
HGB BLD-MCNC: 10.3 G/DL — LOW (ref 13–17)
LYMPHOCYTES # BLD AUTO: 0.9 K/UL — LOW (ref 1–3.3)
LYMPHOCYTES # BLD AUTO: 11.4 % — LOW (ref 13–44)
MCHC RBC-ENTMCNC: 28.6 PG — SIGNIFICANT CHANGE UP (ref 27–34)
MCHC RBC-ENTMCNC: 30.9 G/DL — LOW (ref 32–36)
MCV RBC AUTO: 92.4 FL — SIGNIFICANT CHANGE UP (ref 80–100)
MONOCYTES # BLD AUTO: 0.4 K/UL — SIGNIFICANT CHANGE UP (ref 0–0.9)
MONOCYTES NFR BLD AUTO: 4.8 % — SIGNIFICANT CHANGE UP (ref 2–14)
NEUTROPHILS # BLD AUTO: 6 K/UL — SIGNIFICANT CHANGE UP (ref 1.8–7.4)
NEUTROPHILS NFR BLD AUTO: 78.5 % — HIGH (ref 43–77)
PLATELET # BLD AUTO: 237 K/UL — SIGNIFICANT CHANGE UP (ref 150–400)
RBC # BLD: 3.6 M/UL — LOW (ref 4.2–5.8)
RBC # FLD: 17.8 % — HIGH (ref 10.3–14.5)
WBC # BLD: 7.6 K/UL — SIGNIFICANT CHANGE UP (ref 3.8–10.5)
WBC # FLD AUTO: 7.6 K/UL — SIGNIFICANT CHANGE UP (ref 3.8–10.5)

## 2020-12-22 ENCOUNTER — APPOINTMENT (OUTPATIENT)
Dept: NEUROLOGY | Facility: CLINIC | Age: 85
End: 2020-12-22

## 2020-12-24 ENCOUNTER — APPOINTMENT (OUTPATIENT)
Dept: HEMATOLOGY ONCOLOGY | Facility: CLINIC | Age: 85
End: 2020-12-24

## 2020-12-24 ENCOUNTER — RESULT REVIEW (OUTPATIENT)
Age: 85
End: 2020-12-24

## 2020-12-24 LAB
BASOPHILS # BLD AUTO: 0.1 K/UL — SIGNIFICANT CHANGE UP (ref 0–0.2)
BASOPHILS NFR BLD AUTO: 1.6 % — SIGNIFICANT CHANGE UP (ref 0–2)
EOSINOPHIL # BLD AUTO: 0.2 K/UL — SIGNIFICANT CHANGE UP (ref 0–0.5)
EOSINOPHIL NFR BLD AUTO: 2.1 % — SIGNIFICANT CHANGE UP (ref 0–6)
HCT VFR BLD CALC: 34.4 % — LOW (ref 39–50)
HGB BLD-MCNC: 10.4 G/DL — LOW (ref 13–17)
LYMPHOCYTES # BLD AUTO: 0.7 K/UL — LOW (ref 1–3.3)
LYMPHOCYTES # BLD AUTO: 9.1 % — LOW (ref 13–44)
MCHC RBC-ENTMCNC: 28.4 PG — SIGNIFICANT CHANGE UP (ref 27–34)
MCHC RBC-ENTMCNC: 30.2 G/DL — LOW (ref 32–36)
MCV RBC AUTO: 94.1 FL — SIGNIFICANT CHANGE UP (ref 80–100)
MONOCYTES # BLD AUTO: 0.5 K/UL — SIGNIFICANT CHANGE UP (ref 0–0.9)
MONOCYTES NFR BLD AUTO: 6.7 % — SIGNIFICANT CHANGE UP (ref 2–14)
NEUTROPHILS # BLD AUTO: 6.3 K/UL — SIGNIFICANT CHANGE UP (ref 1.8–7.4)
NEUTROPHILS NFR BLD AUTO: 80.6 % — HIGH (ref 43–77)
PLATELET # BLD AUTO: 248 K/UL — SIGNIFICANT CHANGE UP (ref 150–400)
RBC # BLD: 3.66 M/UL — LOW (ref 4.2–5.8)
RBC # FLD: 18.2 % — HIGH (ref 10.3–14.5)
WBC # BLD: 7.8 K/UL — SIGNIFICANT CHANGE UP (ref 3.8–10.5)
WBC # FLD AUTO: 7.8 K/UL — SIGNIFICANT CHANGE UP (ref 3.8–10.5)

## 2020-12-28 ENCOUNTER — OUTPATIENT (OUTPATIENT)
Dept: OUTPATIENT SERVICES | Facility: HOSPITAL | Age: 85
LOS: 1 days | Discharge: HOME | End: 2020-12-28

## 2020-12-28 DIAGNOSIS — Z98.890 OTHER SPECIFIED POSTPROCEDURAL STATES: Chronic | ICD-10-CM

## 2020-12-28 DIAGNOSIS — I77.0 ARTERIOVENOUS FISTULA, ACQUIRED: Chronic | ICD-10-CM

## 2020-12-28 DIAGNOSIS — D63.1 ANEMIA IN CHRONIC KIDNEY DISEASE: ICD-10-CM

## 2020-12-28 DIAGNOSIS — Z95.2 PRESENCE OF PROSTHETIC HEART VALVE: Chronic | ICD-10-CM

## 2020-12-28 DIAGNOSIS — Z98.62 PERIPHERAL VASCULAR ANGIOPLASTY STATUS: Chronic | ICD-10-CM

## 2020-12-29 ENCOUNTER — APPOINTMENT (OUTPATIENT)
Dept: VASCULAR SURGERY | Facility: CLINIC | Age: 85
End: 2020-12-29

## 2020-12-31 ENCOUNTER — APPOINTMENT (OUTPATIENT)
Age: 85
End: 2020-12-31

## 2020-12-31 ENCOUNTER — RESULT REVIEW (OUTPATIENT)
Age: 85
End: 2020-12-31

## 2020-12-31 ENCOUNTER — APPOINTMENT (OUTPATIENT)
Dept: HEMATOLOGY ONCOLOGY | Facility: CLINIC | Age: 85
End: 2020-12-31

## 2020-12-31 LAB
BASOPHILS # BLD AUTO: 0.1 K/UL — SIGNIFICANT CHANGE UP (ref 0–0.2)
BASOPHILS NFR BLD AUTO: 1.6 % — SIGNIFICANT CHANGE UP (ref 0–2)
EOSINOPHIL # BLD AUTO: 0.2 K/UL — SIGNIFICANT CHANGE UP (ref 0–0.5)
EOSINOPHIL NFR BLD AUTO: 2.9 % — SIGNIFICANT CHANGE UP (ref 0–6)
HCT VFR BLD CALC: 34.5 % — LOW (ref 39–50)
HGB BLD-MCNC: 10.6 G/DL — LOW (ref 13–17)
LYMPHOCYTES # BLD AUTO: 0.7 K/UL — LOW (ref 1–3.3)
LYMPHOCYTES # BLD AUTO: 9.6 % — LOW (ref 13–44)
MCHC RBC-ENTMCNC: 28.7 PG — SIGNIFICANT CHANGE UP (ref 27–34)
MCHC RBC-ENTMCNC: 30.6 G/DL — LOW (ref 32–36)
MCV RBC AUTO: 93.7 FL — SIGNIFICANT CHANGE UP (ref 80–100)
MONOCYTES # BLD AUTO: 0.6 K/UL — SIGNIFICANT CHANGE UP (ref 0–0.9)
MONOCYTES NFR BLD AUTO: 8.5 % — SIGNIFICANT CHANGE UP (ref 2–14)
NEUTROPHILS # BLD AUTO: 5.5 K/UL — SIGNIFICANT CHANGE UP (ref 1.8–7.4)
NEUTROPHILS NFR BLD AUTO: 77.4 % — HIGH (ref 43–77)
PLATELET # BLD AUTO: 194 K/UL — SIGNIFICANT CHANGE UP (ref 150–400)
RBC # BLD: 3.68 M/UL — LOW (ref 4.2–5.8)
RBC # FLD: 18.3 % — HIGH (ref 10.3–14.5)
WBC # BLD: 7.1 K/UL — SIGNIFICANT CHANGE UP (ref 3.8–10.5)
WBC # FLD AUTO: 7.1 K/UL — SIGNIFICANT CHANGE UP (ref 3.8–10.5)

## 2021-01-01 ENCOUNTER — APPOINTMENT (OUTPATIENT)
Dept: ELECTROPHYSIOLOGY | Facility: CLINIC | Age: 86
End: 2021-01-01
Payer: MEDICARE

## 2021-01-01 ENCOUNTER — APPOINTMENT (OUTPATIENT)
Age: 86
End: 2021-01-01

## 2021-01-01 ENCOUNTER — TRANSCRIPTION ENCOUNTER (OUTPATIENT)
Age: 86
End: 2021-01-01

## 2021-01-01 ENCOUNTER — NON-APPOINTMENT (OUTPATIENT)
Age: 86
End: 2021-01-01

## 2021-01-01 ENCOUNTER — OUTPATIENT (OUTPATIENT)
Dept: OUTPATIENT SERVICES | Facility: HOSPITAL | Age: 86
LOS: 1 days | Discharge: HOME | End: 2021-01-01

## 2021-01-01 ENCOUNTER — INPATIENT (INPATIENT)
Facility: HOSPITAL | Age: 86
LOS: 2 days | Discharge: HOME | DRG: 377 | End: 2021-11-24
Attending: STUDENT IN AN ORGANIZED HEALTH CARE EDUCATION/TRAINING PROGRAM
Payer: MEDICARE

## 2021-01-01 ENCOUNTER — RESULT REVIEW (OUTPATIENT)
Age: 86
End: 2021-01-01

## 2021-01-01 ENCOUNTER — RX CHANGE (OUTPATIENT)
Age: 86
End: 2021-01-01

## 2021-01-01 ENCOUNTER — INPATIENT (INPATIENT)
Facility: HOSPITAL | Age: 86
LOS: 2 days | Discharge: HOME | DRG: 291 | End: 2021-07-16
Attending: GENERAL ACUTE CARE HOSPITAL | Admitting: INTERNAL MEDICINE
Payer: MEDICARE

## 2021-01-01 ENCOUNTER — EMERGENCY (EMERGENCY)
Facility: HOSPITAL | Age: 86
LOS: 1 days | Discharge: HOME | End: 2021-01-01
Attending: EMERGENCY MEDICINE
Payer: MEDICARE

## 2021-01-01 ENCOUNTER — APPOINTMENT (OUTPATIENT)
Dept: VASCULAR SURGERY | Facility: CLINIC | Age: 86
End: 2021-01-01

## 2021-01-01 VITALS
RESPIRATION RATE: 18 BRPM | OXYGEN SATURATION: 97 % | TEMPERATURE: 98 F | SYSTOLIC BLOOD PRESSURE: 157 MMHG | DIASTOLIC BLOOD PRESSURE: 57 MMHG | HEART RATE: 61 BPM

## 2021-01-01 VITALS
RESPIRATION RATE: 18 BRPM | HEIGHT: 65 IN | DIASTOLIC BLOOD PRESSURE: 54 MMHG | TEMPERATURE: 98 F | OXYGEN SATURATION: 98 % | SYSTOLIC BLOOD PRESSURE: 123 MMHG | HEART RATE: 67 BPM

## 2021-01-01 VITALS
TEMPERATURE: 98 F | SYSTOLIC BLOOD PRESSURE: 150 MMHG | DIASTOLIC BLOOD PRESSURE: 73 MMHG | OXYGEN SATURATION: 91 % | HEART RATE: 67 BPM

## 2021-01-01 VITALS
SYSTOLIC BLOOD PRESSURE: 143 MMHG | DIASTOLIC BLOOD PRESSURE: 56 MMHG | RESPIRATION RATE: 18 BRPM | OXYGEN SATURATION: 92 % | HEART RATE: 86 BPM | TEMPERATURE: 98 F

## 2021-01-01 VITALS
RESPIRATION RATE: 18 BRPM | WEIGHT: 164.02 LBS | SYSTOLIC BLOOD PRESSURE: 146 MMHG | TEMPERATURE: 98 F | HEART RATE: 78 BPM | HEIGHT: 65 IN | OXYGEN SATURATION: 98 % | DIASTOLIC BLOOD PRESSURE: 67 MMHG

## 2021-01-01 DIAGNOSIS — D62 ACUTE POSTHEMORRHAGIC ANEMIA: ICD-10-CM

## 2021-01-01 DIAGNOSIS — I73.9 PERIPHERAL VASCULAR DISEASE, UNSPECIFIED: ICD-10-CM

## 2021-01-01 DIAGNOSIS — Z98.890 OTHER SPECIFIED POSTPROCEDURAL STATES: Chronic | ICD-10-CM

## 2021-01-01 DIAGNOSIS — Z95.2 PRESENCE OF PROSTHETIC HEART VALVE: Chronic | ICD-10-CM

## 2021-01-01 DIAGNOSIS — N18.4 CHRONIC KIDNEY DISEASE, STAGE 4 (SEVERE): ICD-10-CM

## 2021-01-01 DIAGNOSIS — Z98.62 PERIPHERAL VASCULAR ANGIOPLASTY STATUS: Chronic | ICD-10-CM

## 2021-01-01 DIAGNOSIS — I77.0 ARTERIOVENOUS FISTULA, ACQUIRED: Chronic | ICD-10-CM

## 2021-01-01 DIAGNOSIS — K62.5 HEMORRHAGE OF ANUS AND RECTUM: ICD-10-CM

## 2021-01-01 DIAGNOSIS — D64.9 ANEMIA, UNSPECIFIED: ICD-10-CM

## 2021-01-01 DIAGNOSIS — D63.1 ANEMIA IN CHRONIC KIDNEY DISEASE: ICD-10-CM

## 2021-01-01 DIAGNOSIS — D63.1 CHRONIC KIDNEY DISEASE, STAGE 4 (SEVERE): ICD-10-CM

## 2021-01-01 DIAGNOSIS — K92.2 GASTROINTESTINAL HEMORRHAGE, UNSPECIFIED: ICD-10-CM

## 2021-01-01 DIAGNOSIS — I10 ESSENTIAL (PRIMARY) HYPERTENSION: ICD-10-CM

## 2021-01-01 DIAGNOSIS — N18.6 END STAGE RENAL DISEASE: ICD-10-CM

## 2021-01-01 DIAGNOSIS — E78.5 HYPERLIPIDEMIA, UNSPECIFIED: ICD-10-CM

## 2021-01-01 DIAGNOSIS — N40.0 BENIGN PROSTATIC HYPERPLASIA WITHOUT LOWER URINARY TRACT SYMPTOMS: ICD-10-CM

## 2021-01-01 DIAGNOSIS — I48.0 PAROXYSMAL ATRIAL FIBRILLATION: ICD-10-CM

## 2021-01-01 DIAGNOSIS — I50.32 CHRONIC DIASTOLIC (CONGESTIVE) HEART FAILURE: ICD-10-CM

## 2021-01-01 DIAGNOSIS — Z02.9 ENCOUNTER FOR ADMINISTRATIVE EXAMINATIONS, UNSPECIFIED: ICD-10-CM

## 2021-01-01 LAB
A1C WITH ESTIMATED AVERAGE GLUCOSE RESULT: 5 % — SIGNIFICANT CHANGE UP (ref 4–5.6)
ALBUMIN SERPL ELPH-MCNC: 2.9 G/DL — LOW (ref 3.3–5.2)
ALBUMIN SERPL ELPH-MCNC: 2.9 G/DL — LOW (ref 3.3–5.2)
ALBUMIN SERPL ELPH-MCNC: 3.5 G/DL — SIGNIFICANT CHANGE UP (ref 3.3–5.2)
ALBUMIN SERPL ELPH-MCNC: 3.6 G/DL — SIGNIFICANT CHANGE UP (ref 3.3–5.2)
ALBUMIN SERPL ELPH-MCNC: 3.9 G/DL — SIGNIFICANT CHANGE UP (ref 3.3–5.2)
ALBUMIN SERPL ELPH-MCNC: 4 G/DL — SIGNIFICANT CHANGE UP (ref 3.3–5.2)
ALP SERPL-CCNC: 112 U/L — SIGNIFICANT CHANGE UP (ref 40–120)
ALP SERPL-CCNC: 112 U/L — SIGNIFICANT CHANGE UP (ref 40–120)
ALP SERPL-CCNC: 121 U/L — HIGH (ref 40–120)
ALP SERPL-CCNC: 137 U/L — HIGH (ref 40–120)
ALP SERPL-CCNC: 137 U/L — HIGH (ref 40–120)
ALP SERPL-CCNC: 142 U/L — HIGH (ref 40–120)
ALT FLD-CCNC: 10 U/L — SIGNIFICANT CHANGE UP
ALT FLD-CCNC: 13 U/L — SIGNIFICANT CHANGE UP
ALT FLD-CCNC: 14 U/L — SIGNIFICANT CHANGE UP
ALT FLD-CCNC: 42 U/L — HIGH
ALT FLD-CCNC: 49 U/L — HIGH
ALT FLD-CCNC: 8 U/L — SIGNIFICANT CHANGE UP
ANION GAP SERPL CALC-SCNC: 12 MMOL/L — SIGNIFICANT CHANGE UP (ref 5–17)
ANION GAP SERPL CALC-SCNC: 14 MMOL/L — SIGNIFICANT CHANGE UP (ref 5–17)
ANION GAP SERPL CALC-SCNC: 16 MMOL/L — SIGNIFICANT CHANGE UP (ref 5–17)
ANION GAP SERPL CALC-SCNC: 19 MMOL/L — HIGH (ref 5–17)
ANION GAP SERPL CALC-SCNC: 19 MMOL/L — HIGH (ref 5–17)
APTT BLD: 27 SEC — LOW (ref 27.5–35.5)
APTT BLD: 36.8 SEC — HIGH (ref 27.5–35.5)
APTT BLD: 38 SEC — HIGH (ref 27.5–35.5)
APTT BLD: 41.5 SEC — HIGH (ref 27.5–35.5)
AST SERPL-CCNC: 10 U/L — SIGNIFICANT CHANGE UP
AST SERPL-CCNC: 10 U/L — SIGNIFICANT CHANGE UP
AST SERPL-CCNC: 24 U/L — SIGNIFICANT CHANGE UP
AST SERPL-CCNC: 24 U/L — SIGNIFICANT CHANGE UP
AST SERPL-CCNC: 26 U/L — SIGNIFICANT CHANGE UP
AST SERPL-CCNC: 49 U/L — HIGH
BASOPHILS # BLD AUTO: 0 K/UL — SIGNIFICANT CHANGE UP (ref 0–0.2)
BASOPHILS # BLD AUTO: 0.03 K/UL — SIGNIFICANT CHANGE UP (ref 0–0.2)
BASOPHILS # BLD AUTO: 0.08 K/UL — SIGNIFICANT CHANGE UP (ref 0–0.2)
BASOPHILS # BLD AUTO: 0.08 K/UL — SIGNIFICANT CHANGE UP (ref 0–0.2)
BASOPHILS # BLD AUTO: 0.09 K/UL — SIGNIFICANT CHANGE UP (ref 0–0.2)
BASOPHILS # BLD AUTO: 0.1 K/UL — SIGNIFICANT CHANGE UP (ref 0–0.2)
BASOPHILS # BLD AUTO: 0.1 K/UL — SIGNIFICANT CHANGE UP (ref 0–0.2)
BASOPHILS NFR BLD AUTO: 0 % — SIGNIFICANT CHANGE UP (ref 0–2)
BASOPHILS NFR BLD AUTO: 0.4 % — SIGNIFICANT CHANGE UP (ref 0–2)
BASOPHILS NFR BLD AUTO: 0.9 % — SIGNIFICANT CHANGE UP (ref 0–2)
BASOPHILS NFR BLD AUTO: 1.4 % — SIGNIFICANT CHANGE UP (ref 0–2)
BASOPHILS NFR BLD AUTO: 1.4 % — SIGNIFICANT CHANGE UP (ref 0–2)
BASOPHILS NFR BLD AUTO: 1.8 % — SIGNIFICANT CHANGE UP (ref 0–2)
BASOPHILS NFR BLD AUTO: 2.1 % — HIGH (ref 0–2)
BILIRUB SERPL-MCNC: 0.4 MG/DL — SIGNIFICANT CHANGE UP (ref 0.4–2)
BILIRUB SERPL-MCNC: 0.5 MG/DL — SIGNIFICANT CHANGE UP (ref 0.4–2)
BILIRUB SERPL-MCNC: 0.6 MG/DL — SIGNIFICANT CHANGE UP (ref 0.4–2)
BILIRUB SERPL-MCNC: 0.8 MG/DL — SIGNIFICANT CHANGE UP (ref 0.4–2)
BILIRUB SERPL-MCNC: 1.2 MG/DL — SIGNIFICANT CHANGE UP (ref 0.4–2)
BILIRUB SERPL-MCNC: 1.3 MG/DL — SIGNIFICANT CHANGE UP (ref 0.4–2)
BLD GP AB SCN SERPL QL: SIGNIFICANT CHANGE UP
BUN SERPL-MCNC: 35 MG/DL — HIGH (ref 8–20)
BUN SERPL-MCNC: 37.4 MG/DL — HIGH (ref 8–20)
BUN SERPL-MCNC: 39 MG/DL — HIGH (ref 8–20)
BUN SERPL-MCNC: 44.3 MG/DL — HIGH (ref 8–20)
BUN SERPL-MCNC: 46.8 MG/DL — HIGH (ref 8–20)
BUN SERPL-MCNC: 49.1 MG/DL — HIGH (ref 8–20)
BUN SERPL-MCNC: 49.2 MG/DL — HIGH (ref 8–20)
BUN SERPL-MCNC: 54 MG/DL — HIGH (ref 8–20)
CALCIUM SERPL-MCNC: 8.8 MG/DL — SIGNIFICANT CHANGE UP (ref 8.6–10.2)
CALCIUM SERPL-MCNC: 9.1 MG/DL — SIGNIFICANT CHANGE UP (ref 8.6–10.2)
CALCIUM SERPL-MCNC: 9.2 MG/DL — SIGNIFICANT CHANGE UP (ref 8.6–10.2)
CALCIUM SERPL-MCNC: 9.3 MG/DL — SIGNIFICANT CHANGE UP (ref 8.6–10.2)
CALCIUM SERPL-MCNC: 9.4 MG/DL — SIGNIFICANT CHANGE UP (ref 8.6–10.2)
CALCIUM SERPL-MCNC: 9.5 MG/DL — SIGNIFICANT CHANGE UP (ref 8.6–10.2)
CHLORIDE SERPL-SCNC: 95 MMOL/L — LOW (ref 98–107)
CHLORIDE SERPL-SCNC: 97 MMOL/L — LOW (ref 98–107)
CHLORIDE SERPL-SCNC: 97 MMOL/L — LOW (ref 98–107)
CHLORIDE SERPL-SCNC: 98 MMOL/L — SIGNIFICANT CHANGE UP (ref 98–107)
CHLORIDE SERPL-SCNC: 98 MMOL/L — SIGNIFICANT CHANGE UP (ref 98–107)
CHLORIDE SERPL-SCNC: 99 MMOL/L — SIGNIFICANT CHANGE UP (ref 98–107)
CO2 SERPL-SCNC: 24 MMOL/L — SIGNIFICANT CHANGE UP (ref 22–29)
CO2 SERPL-SCNC: 26 MMOL/L — SIGNIFICANT CHANGE UP (ref 22–29)
CO2 SERPL-SCNC: 26 MMOL/L — SIGNIFICANT CHANGE UP (ref 22–29)
CO2 SERPL-SCNC: 27 MMOL/L — SIGNIFICANT CHANGE UP (ref 22–29)
CO2 SERPL-SCNC: 28 MMOL/L — SIGNIFICANT CHANGE UP (ref 22–29)
CO2 SERPL-SCNC: 28 MMOL/L — SIGNIFICANT CHANGE UP (ref 22–29)
CO2 SERPL-SCNC: 29 MMOL/L — SIGNIFICANT CHANGE UP (ref 22–29)
CO2 SERPL-SCNC: 29 MMOL/L — SIGNIFICANT CHANGE UP (ref 22–29)
COVID-19 NUCLEOCAPSID GAM AB INTERP: NEGATIVE — SIGNIFICANT CHANGE UP
COVID-19 NUCLEOCAPSID TOTAL GAM ANTIBODY RESULT: 0.41 INDEX — SIGNIFICANT CHANGE UP
COVID-19 SPIKE DOMAIN AB INTERP: POSITIVE
COVID-19 SPIKE DOMAIN AB INTERP: POSITIVE
COVID-19 SPIKE DOMAIN ANTIBODY RESULT: 1.4 U/ML — HIGH
COVID-19 SPIKE DOMAIN ANTIBODY RESULT: 9.6 U/ML — HIGH
CREAT SERPL-MCNC: 3.06 MG/DL — HIGH (ref 0.5–1.3)
CREAT SERPL-MCNC: 3.16 MG/DL — HIGH (ref 0.5–1.3)
CREAT SERPL-MCNC: 3.41 MG/DL — HIGH (ref 0.5–1.3)
CREAT SERPL-MCNC: 3.71 MG/DL — HIGH (ref 0.5–1.3)
CREAT SERPL-MCNC: 3.78 MG/DL — HIGH (ref 0.5–1.3)
CREAT SERPL-MCNC: 4.3 MG/DL — HIGH (ref 0.5–1.3)
CREAT SERPL-MCNC: 4.81 MG/DL — HIGH (ref 0.5–1.3)
CREAT SERPL-MCNC: 4.91 MG/DL — HIGH (ref 0.5–1.3)
EOSINOPHIL # BLD AUTO: 0.01 K/UL — SIGNIFICANT CHANGE UP (ref 0–0.5)
EOSINOPHIL # BLD AUTO: 0.07 K/UL — SIGNIFICANT CHANGE UP (ref 0–0.5)
EOSINOPHIL # BLD AUTO: 0.09 K/UL — SIGNIFICANT CHANGE UP (ref 0–0.5)
EOSINOPHIL # BLD AUTO: 0.1 K/UL — SIGNIFICANT CHANGE UP (ref 0–0.5)
EOSINOPHIL # BLD AUTO: 0.2 K/UL — SIGNIFICANT CHANGE UP (ref 0–0.5)
EOSINOPHIL # BLD AUTO: 0.2 K/UL — SIGNIFICANT CHANGE UP (ref 0–0.5)
EOSINOPHIL # BLD AUTO: 0.24 K/UL — SIGNIFICANT CHANGE UP (ref 0–0.5)
EOSINOPHIL NFR BLD AUTO: 0.1 % — SIGNIFICANT CHANGE UP (ref 0–6)
EOSINOPHIL NFR BLD AUTO: 1.1 % — SIGNIFICANT CHANGE UP (ref 0–6)
EOSINOPHIL NFR BLD AUTO: 1.6 % — SIGNIFICANT CHANGE UP (ref 0–6)
EOSINOPHIL NFR BLD AUTO: 2.3 % — SIGNIFICANT CHANGE UP (ref 0–6)
EOSINOPHIL NFR BLD AUTO: 2.4 % — SIGNIFICANT CHANGE UP (ref 0–6)
EOSINOPHIL NFR BLD AUTO: 2.6 % — SIGNIFICANT CHANGE UP (ref 0–6)
EOSINOPHIL NFR BLD AUTO: 2.6 % — SIGNIFICANT CHANGE UP (ref 0–6)
ESTIMATED AVERAGE GLUCOSE: 97 MG/DL — SIGNIFICANT CHANGE UP (ref 68–114)
FLUAV AG NPH QL: SIGNIFICANT CHANGE UP
FLUBV AG NPH QL: SIGNIFICANT CHANGE UP
GLUCOSE BLDC GLUCOMTR-MCNC: 100 MG/DL — HIGH (ref 70–99)
GLUCOSE BLDC GLUCOMTR-MCNC: 110 MG/DL — HIGH (ref 70–99)
GLUCOSE BLDC GLUCOMTR-MCNC: 200 MG/DL — HIGH (ref 70–99)
GLUCOSE SERPL-MCNC: 102 MG/DL — HIGH (ref 70–99)
GLUCOSE SERPL-MCNC: 105 MG/DL — HIGH (ref 70–99)
GLUCOSE SERPL-MCNC: 138 MG/DL — HIGH (ref 70–99)
GLUCOSE SERPL-MCNC: 170 MG/DL — HIGH (ref 70–99)
GLUCOSE SERPL-MCNC: 328 MG/DL — HIGH (ref 70–99)
GLUCOSE SERPL-MCNC: 91 MG/DL — SIGNIFICANT CHANGE UP (ref 70–99)
GLUCOSE SERPL-MCNC: 92 MG/DL — SIGNIFICANT CHANGE UP (ref 70–99)
GLUCOSE SERPL-MCNC: 98 MG/DL — SIGNIFICANT CHANGE UP (ref 70–99)
HCT VFR BLD CALC: 22.9 % — LOW (ref 39–50)
HCT VFR BLD CALC: 23.4 % — LOW (ref 39–50)
HCT VFR BLD CALC: 26.9 % — LOW (ref 39–50)
HCT VFR BLD CALC: 27.3 % — LOW (ref 39–50)
HCT VFR BLD CALC: 29.8 % — LOW (ref 39–50)
HCT VFR BLD CALC: 30.1 % — LOW (ref 39–50)
HCT VFR BLD CALC: 30.7 % — LOW (ref 39–50)
HCT VFR BLD CALC: 31.6 % — LOW (ref 39–50)
HCT VFR BLD CALC: 33.3 % — LOW (ref 39–50)
HCT VFR BLD CALC: 35.3 % — LOW (ref 39–50)
HCT VFR BLD CALC: 39.2 % — SIGNIFICANT CHANGE UP (ref 39–50)
HCT VFR BLD CALC: 40.8 % — SIGNIFICANT CHANGE UP (ref 39–50)
HGB BLD-MCNC: 10 G/DL — LOW (ref 13–17)
HGB BLD-MCNC: 10.7 G/DL — LOW (ref 13–17)
HGB BLD-MCNC: 10.8 G/DL — LOW (ref 13–17)
HGB BLD-MCNC: 12 G/DL — LOW (ref 13–17)
HGB BLD-MCNC: 12.3 G/DL — LOW (ref 13–17)
HGB BLD-MCNC: 6.7 G/DL — CRITICAL LOW (ref 13–17)
HGB BLD-MCNC: 7.1 G/DL — LOW (ref 13–17)
HGB BLD-MCNC: 8.3 G/DL — LOW (ref 13–17)
HGB BLD-MCNC: 8.6 G/DL — LOW (ref 13–17)
HGB BLD-MCNC: 9.3 G/DL — LOW (ref 13–17)
HGB BLD-MCNC: 9.6 G/DL — LOW (ref 13–17)
HGB BLD-MCNC: 9.6 G/DL — LOW (ref 13–17)
IMM GRANULOCYTES NFR BLD AUTO: 0.2 % — SIGNIFICANT CHANGE UP (ref 0–1.5)
IMM GRANULOCYTES NFR BLD AUTO: 0.5 % — SIGNIFICANT CHANGE UP (ref 0–1.5)
IMM GRANULOCYTES NFR BLD AUTO: 1.4 % — SIGNIFICANT CHANGE UP (ref 0–1.5)
IMM GRANULOCYTES NFR BLD AUTO: 1.5 % — SIGNIFICANT CHANGE UP (ref 0–1.5)
INR BLD: 1.13 RATIO — SIGNIFICANT CHANGE UP (ref 0.88–1.16)
INR BLD: 1.15 RATIO — SIGNIFICANT CHANGE UP (ref 0.88–1.16)
INR BLD: 1.18 RATIO — HIGH (ref 0.88–1.16)
INR BLD: 1.21 RATIO — HIGH (ref 0.88–1.16)
LACTATE BLDV-MCNC: 1.4 MMOL/L — SIGNIFICANT CHANGE UP (ref 0.5–2)
LIDOCAIN IGE QN: 32 U/L — SIGNIFICANT CHANGE UP (ref 22–51)
LYMPHOCYTES # BLD AUTO: 0.25 K/UL — LOW (ref 1–3.3)
LYMPHOCYTES # BLD AUTO: 0.48 K/UL — LOW (ref 1–3.3)
LYMPHOCYTES # BLD AUTO: 0.5 K/UL — LOW (ref 1–3.3)
LYMPHOCYTES # BLD AUTO: 0.53 K/UL — LOW (ref 1–3.3)
LYMPHOCYTES # BLD AUTO: 0.55 K/UL — LOW (ref 1–3.3)
LYMPHOCYTES # BLD AUTO: 0.61 K/UL — LOW (ref 1–3.3)
LYMPHOCYTES # BLD AUTO: 0.7 K/UL — LOW (ref 1–3.3)
LYMPHOCYTES # BLD AUTO: 11.7 % — LOW (ref 13–44)
LYMPHOCYTES # BLD AUTO: 3.5 % — LOW (ref 13–44)
LYMPHOCYTES # BLD AUTO: 5.2 % — LOW (ref 13–44)
LYMPHOCYTES # BLD AUTO: 6.9 % — LOW (ref 13–44)
LYMPHOCYTES # BLD AUTO: 7 % — LOW (ref 13–44)
LYMPHOCYTES # BLD AUTO: 8.3 % — LOW (ref 13–44)
LYMPHOCYTES # BLD AUTO: 9.6 % — LOW (ref 13–44)
MAGNESIUM SERPL-MCNC: 1.9 MG/DL — SIGNIFICANT CHANGE UP (ref 1.6–2.6)
MAGNESIUM SERPL-MCNC: 1.9 MG/DL — SIGNIFICANT CHANGE UP (ref 1.6–2.6)
MAGNESIUM SERPL-MCNC: 2 MG/DL — SIGNIFICANT CHANGE UP (ref 1.6–2.6)
MCHC RBC-ENTMCNC: 29.3 GM/DL — LOW (ref 32–36)
MCHC RBC-ENTMCNC: 29.3 PG — SIGNIFICANT CHANGE UP (ref 27–34)
MCHC RBC-ENTMCNC: 30.1 PG — SIGNIFICANT CHANGE UP (ref 27–34)
MCHC RBC-ENTMCNC: 30.2 G/DL — LOW (ref 32–36)
MCHC RBC-ENTMCNC: 30.2 PG — SIGNIFICANT CHANGE UP (ref 27–34)
MCHC RBC-ENTMCNC: 30.3 GM/DL — LOW (ref 32–36)
MCHC RBC-ENTMCNC: 30.3 PG — SIGNIFICANT CHANGE UP (ref 27–34)
MCHC RBC-ENTMCNC: 30.5 G/DL — LOW (ref 32–36)
MCHC RBC-ENTMCNC: 30.5 PG — SIGNIFICANT CHANGE UP (ref 27–34)
MCHC RBC-ENTMCNC: 30.5 PG — SIGNIFICANT CHANGE UP (ref 27–34)
MCHC RBC-ENTMCNC: 30.6 GM/DL — LOW (ref 32–36)
MCHC RBC-ENTMCNC: 30.6 PG — SIGNIFICANT CHANGE UP (ref 27–34)
MCHC RBC-ENTMCNC: 30.6 PG — SIGNIFICANT CHANGE UP (ref 27–34)
MCHC RBC-ENTMCNC: 30.9 GM/DL — LOW (ref 32–36)
MCHC RBC-ENTMCNC: 30.9 PG — SIGNIFICANT CHANGE UP (ref 27–34)
MCHC RBC-ENTMCNC: 31 PG — SIGNIFICANT CHANGE UP (ref 27–34)
MCHC RBC-ENTMCNC: 31.1 PG — SIGNIFICANT CHANGE UP (ref 27–34)
MCHC RBC-ENTMCNC: 31.2 GM/DL — LOW (ref 32–36)
MCHC RBC-ENTMCNC: 31.3 GM/DL — LOW (ref 32–36)
MCHC RBC-ENTMCNC: 31.5 GM/DL — LOW (ref 32–36)
MCHC RBC-ENTMCNC: 31.6 GM/DL — LOW (ref 32–36)
MCHC RBC-ENTMCNC: 32.1 GM/DL — SIGNIFICANT CHANGE UP (ref 32–36)
MCV RBC AUTO: 100 FL — SIGNIFICANT CHANGE UP (ref 80–100)
MCV RBC AUTO: 100 FL — SIGNIFICANT CHANGE UP (ref 80–100)
MCV RBC AUTO: 100.2 FL — HIGH (ref 80–100)
MCV RBC AUTO: 100.7 FL — HIGH (ref 80–100)
MCV RBC AUTO: 101 FL — HIGH (ref 80–100)
MCV RBC AUTO: 96.2 FL — SIGNIFICANT CHANGE UP (ref 80–100)
MCV RBC AUTO: 96.4 FL — SIGNIFICANT CHANGE UP (ref 80–100)
MCV RBC AUTO: 96.5 FL — SIGNIFICANT CHANGE UP (ref 80–100)
MCV RBC AUTO: 96.6 FL — SIGNIFICANT CHANGE UP (ref 80–100)
MCV RBC AUTO: 98.6 FL — SIGNIFICANT CHANGE UP (ref 80–100)
MCV RBC AUTO: 99.5 FL — SIGNIFICANT CHANGE UP (ref 80–100)
MONOCYTES # BLD AUTO: 0.21 K/UL — SIGNIFICANT CHANGE UP (ref 0–0.9)
MONOCYTES # BLD AUTO: 0.33 K/UL — SIGNIFICANT CHANGE UP (ref 0–0.9)
MONOCYTES # BLD AUTO: 0.4 K/UL — SIGNIFICANT CHANGE UP (ref 0–0.9)
MONOCYTES # BLD AUTO: 0.43 K/UL — SIGNIFICANT CHANGE UP (ref 0–0.9)
MONOCYTES # BLD AUTO: 0.47 K/UL — SIGNIFICANT CHANGE UP (ref 0–0.9)
MONOCYTES # BLD AUTO: 0.5 K/UL — SIGNIFICANT CHANGE UP (ref 0–0.9)
MONOCYTES # BLD AUTO: 0.56 K/UL — SIGNIFICANT CHANGE UP (ref 0–0.9)
MONOCYTES NFR BLD AUTO: 2.9 % — SIGNIFICANT CHANGE UP (ref 2–14)
MONOCYTES NFR BLD AUTO: 3.5 % — SIGNIFICANT CHANGE UP (ref 2–14)
MONOCYTES NFR BLD AUTO: 5.7 % — SIGNIFICANT CHANGE UP (ref 2–14)
MONOCYTES NFR BLD AUTO: 6.3 % — SIGNIFICANT CHANGE UP (ref 2–14)
MONOCYTES NFR BLD AUTO: 6.5 % — SIGNIFICANT CHANGE UP (ref 2–14)
MONOCYTES NFR BLD AUTO: 8.3 % — SIGNIFICANT CHANGE UP (ref 2–14)
MONOCYTES NFR BLD AUTO: 8.5 % — SIGNIFICANT CHANGE UP (ref 2–14)
MRSA PCR RESULT.: SIGNIFICANT CHANGE UP
NEUTROPHILS # BLD AUTO: 4.3 K/UL — SIGNIFICANT CHANGE UP (ref 1.8–7.4)
NEUTROPHILS # BLD AUTO: 4.35 K/UL — SIGNIFICANT CHANGE UP (ref 1.8–7.4)
NEUTROPHILS # BLD AUTO: 5.42 K/UL — SIGNIFICANT CHANGE UP (ref 1.8–7.4)
NEUTROPHILS # BLD AUTO: 5.8 K/UL — SIGNIFICANT CHANGE UP (ref 1.8–7.4)
NEUTROPHILS # BLD AUTO: 6.57 K/UL — SIGNIFICANT CHANGE UP (ref 1.8–7.4)
NEUTROPHILS # BLD AUTO: 7.25 K/UL — SIGNIFICANT CHANGE UP (ref 1.8–7.4)
NEUTROPHILS # BLD AUTO: 8.17 K/UL — HIGH (ref 1.8–7.4)
NEUTROPHILS NFR BLD AUTO: 75.6 % — SIGNIFICANT CHANGE UP (ref 43–77)
NEUTROPHILS NFR BLD AUTO: 78.7 % — HIGH (ref 43–77)
NEUTROPHILS NFR BLD AUTO: 82.2 % — HIGH (ref 43–77)
NEUTROPHILS NFR BLD AUTO: 82.2 % — HIGH (ref 43–77)
NEUTROPHILS NFR BLD AUTO: 82.8 % — HIGH (ref 43–77)
NEUTROPHILS NFR BLD AUTO: 87.8 % — HIGH (ref 43–77)
NEUTROPHILS NFR BLD AUTO: 91.6 % — HIGH (ref 43–77)
OB PNL STL: POSITIVE
PHOSPHATE SERPL-MCNC: 2.2 MG/DL — LOW (ref 2.4–4.7)
PHOSPHATE SERPL-MCNC: 2.9 MG/DL — SIGNIFICANT CHANGE UP (ref 2.4–4.7)
PHOSPHATE SERPL-MCNC: 3.7 MG/DL — SIGNIFICANT CHANGE UP (ref 2.4–4.7)
PLATELET # BLD AUTO: 180 K/UL — SIGNIFICANT CHANGE UP (ref 150–400)
PLATELET # BLD AUTO: 181 K/UL — SIGNIFICANT CHANGE UP (ref 150–400)
PLATELET # BLD AUTO: 185 K/UL — SIGNIFICANT CHANGE UP (ref 150–400)
PLATELET # BLD AUTO: 185 K/UL — SIGNIFICANT CHANGE UP (ref 150–400)
PLATELET # BLD AUTO: 192 K/UL — SIGNIFICANT CHANGE UP (ref 150–400)
PLATELET # BLD AUTO: 196 K/UL — SIGNIFICANT CHANGE UP (ref 150–400)
PLATELET # BLD AUTO: 200 K/UL — SIGNIFICANT CHANGE UP (ref 150–400)
PLATELET # BLD AUTO: 211 K/UL — SIGNIFICANT CHANGE UP (ref 150–400)
PLATELET # BLD AUTO: 214 K/UL — SIGNIFICANT CHANGE UP (ref 150–400)
PLATELET # BLD AUTO: 218 K/UL — SIGNIFICANT CHANGE UP (ref 150–400)
PLATELET # BLD AUTO: 252 K/UL — SIGNIFICANT CHANGE UP (ref 150–400)
POTASSIUM SERPL-MCNC: 3.4 MMOL/L — LOW (ref 3.5–5.3)
POTASSIUM SERPL-MCNC: 3.6 MMOL/L — SIGNIFICANT CHANGE UP (ref 3.5–5.3)
POTASSIUM SERPL-MCNC: 3.6 MMOL/L — SIGNIFICANT CHANGE UP (ref 3.5–5.3)
POTASSIUM SERPL-MCNC: 4 MMOL/L — SIGNIFICANT CHANGE UP (ref 3.5–5.3)
POTASSIUM SERPL-MCNC: 4.2 MMOL/L — SIGNIFICANT CHANGE UP (ref 3.5–5.3)
POTASSIUM SERPL-MCNC: 4.3 MMOL/L — SIGNIFICANT CHANGE UP (ref 3.5–5.3)
POTASSIUM SERPL-MCNC: 4.4 MMOL/L — SIGNIFICANT CHANGE UP (ref 3.5–5.3)
POTASSIUM SERPL-MCNC: 4.8 MMOL/L — SIGNIFICANT CHANGE UP (ref 3.5–5.3)
POTASSIUM SERPL-SCNC: 3.4 MMOL/L — LOW (ref 3.5–5.3)
POTASSIUM SERPL-SCNC: 3.6 MMOL/L — SIGNIFICANT CHANGE UP (ref 3.5–5.3)
POTASSIUM SERPL-SCNC: 3.6 MMOL/L — SIGNIFICANT CHANGE UP (ref 3.5–5.3)
POTASSIUM SERPL-SCNC: 4 MMOL/L — SIGNIFICANT CHANGE UP (ref 3.5–5.3)
POTASSIUM SERPL-SCNC: 4.2 MMOL/L — SIGNIFICANT CHANGE UP (ref 3.5–5.3)
POTASSIUM SERPL-SCNC: 4.3 MMOL/L — SIGNIFICANT CHANGE UP (ref 3.5–5.3)
POTASSIUM SERPL-SCNC: 4.4 MMOL/L — SIGNIFICANT CHANGE UP (ref 3.5–5.3)
POTASSIUM SERPL-SCNC: 4.8 MMOL/L — SIGNIFICANT CHANGE UP (ref 3.5–5.3)
PROT SERPL-MCNC: 5.9 G/DL — LOW (ref 6.6–8.7)
PROT SERPL-MCNC: 6.2 G/DL — LOW (ref 6.6–8.7)
PROT SERPL-MCNC: 6.5 G/DL — LOW (ref 6.6–8.7)
PROT SERPL-MCNC: 6.6 G/DL — SIGNIFICANT CHANGE UP (ref 6.6–8.7)
PROT SERPL-MCNC: 7 G/DL — SIGNIFICANT CHANGE UP (ref 6.6–8.7)
PROT SERPL-MCNC: 7.2 G/DL — SIGNIFICANT CHANGE UP (ref 6.6–8.7)
PROTHROM AB SERPL-ACNC: 13 SEC — SIGNIFICANT CHANGE UP (ref 10.6–13.6)
PROTHROM AB SERPL-ACNC: 13.2 SEC — SIGNIFICANT CHANGE UP (ref 10.6–13.6)
PROTHROM AB SERPL-ACNC: 13.6 SEC — SIGNIFICANT CHANGE UP (ref 10.6–13.6)
PROTHROM AB SERPL-ACNC: 13.9 SEC — HIGH (ref 10.6–13.6)
RAPID RVP RESULT: SIGNIFICANT CHANGE UP
RBC # BLD: 2.29 M/UL — LOW (ref 4.2–5.8)
RBC # BLD: 2.34 M/UL — LOW (ref 4.2–5.8)
RBC # BLD: 2.67 M/UL — LOW (ref 4.2–5.8)
RBC # BLD: 2.77 M/UL — LOW (ref 4.2–5.8)
RBC # BLD: 3.09 M/UL — LOW (ref 4.2–5.8)
RBC # BLD: 3.18 M/UL — LOW (ref 4.2–5.8)
RBC # BLD: 3.27 M/UL — LOW (ref 4.2–5.8)
RBC # BLD: 3.46 M/UL — LOW (ref 4.2–5.8)
RBC # BLD: 3.52 M/UL — LOW (ref 4.2–5.8)
RBC # BLD: 3.94 M/UL — LOW (ref 4.2–5.8)
RBC # BLD: 4.04 M/UL — LOW (ref 4.2–5.8)
RBC # FLD: 16.5 % — HIGH (ref 10.3–14.5)
RBC # FLD: 16.6 % — HIGH (ref 10.3–14.5)
RBC # FLD: 16.8 % — HIGH (ref 10.3–14.5)
RBC # FLD: 17.2 % — HIGH (ref 10.3–14.5)
RBC # FLD: 17.3 % — HIGH (ref 10.3–14.5)
RBC # FLD: 17.3 % — HIGH (ref 10.3–14.5)
RBC # FLD: 17.5 % — HIGH (ref 10.3–14.5)
RBC # FLD: 17.6 % — HIGH (ref 10.3–14.5)
RBC # FLD: 18 % — HIGH (ref 10.3–14.5)
RBC # FLD: 18.4 % — HIGH (ref 10.3–14.5)
RBC # FLD: 18.5 % — HIGH (ref 10.3–14.5)
RSV RNA NPH QL NAA+NON-PROBE: SIGNIFICANT CHANGE UP
S AUREUS DNA NOSE QL NAA+PROBE: SIGNIFICANT CHANGE UP
SARS-COV-2 IGG+IGM SERPL QL IA: 0.41 INDEX — SIGNIFICANT CHANGE UP
SARS-COV-2 IGG+IGM SERPL QL IA: 1.4 U/ML — HIGH
SARS-COV-2 IGG+IGM SERPL QL IA: 9.6 U/ML — HIGH
SARS-COV-2 IGG+IGM SERPL QL IA: NEGATIVE — SIGNIFICANT CHANGE UP
SARS-COV-2 IGG+IGM SERPL QL IA: POSITIVE
SARS-COV-2 IGG+IGM SERPL QL IA: POSITIVE
SARS-COV-2 RNA SPEC QL NAA+PROBE: SIGNIFICANT CHANGE UP
SODIUM SERPL-SCNC: 135 MMOL/L — SIGNIFICANT CHANGE UP (ref 135–145)
SODIUM SERPL-SCNC: 136 MMOL/L — SIGNIFICANT CHANGE UP (ref 135–145)
SODIUM SERPL-SCNC: 137 MMOL/L — SIGNIFICANT CHANGE UP (ref 135–145)
SODIUM SERPL-SCNC: 138 MMOL/L — SIGNIFICANT CHANGE UP (ref 135–145)
SODIUM SERPL-SCNC: 140 MMOL/L — SIGNIFICANT CHANGE UP (ref 135–145)
SURGICAL PATHOLOGY STUDY: SIGNIFICANT CHANGE UP
TROPONIN T SERPL-MCNC: 0.48 NG/ML — HIGH (ref 0–0.06)
WBC # BLD: 5.53 K/UL — SIGNIFICANT CHANGE UP (ref 3.8–10.5)
WBC # BLD: 5.7 K/UL — SIGNIFICANT CHANGE UP (ref 3.8–10.5)
WBC # BLD: 6.59 K/UL — SIGNIFICANT CHANGE UP (ref 3.8–10.5)
WBC # BLD: 7 K/UL — SIGNIFICANT CHANGE UP (ref 3.8–10.5)
WBC # BLD: 7.08 K/UL — SIGNIFICANT CHANGE UP (ref 3.8–10.5)
WBC # BLD: 7.18 K/UL — SIGNIFICANT CHANGE UP (ref 3.8–10.5)
WBC # BLD: 7.23 K/UL — SIGNIFICANT CHANGE UP (ref 3.8–10.5)
WBC # BLD: 7.91 K/UL — SIGNIFICANT CHANGE UP (ref 3.8–10.5)
WBC # BLD: 8.79 K/UL — SIGNIFICANT CHANGE UP (ref 3.8–10.5)
WBC # BLD: 8.82 K/UL — SIGNIFICANT CHANGE UP (ref 3.8–10.5)
WBC # BLD: 9.31 K/UL — SIGNIFICANT CHANGE UP (ref 3.8–10.5)
WBC # FLD AUTO: 5.53 K/UL — SIGNIFICANT CHANGE UP (ref 3.8–10.5)
WBC # FLD AUTO: 5.7 K/UL — SIGNIFICANT CHANGE UP (ref 3.8–10.5)
WBC # FLD AUTO: 6.59 K/UL — SIGNIFICANT CHANGE UP (ref 3.8–10.5)
WBC # FLD AUTO: 7 K/UL — SIGNIFICANT CHANGE UP (ref 3.8–10.5)
WBC # FLD AUTO: 7.08 K/UL — SIGNIFICANT CHANGE UP (ref 3.8–10.5)
WBC # FLD AUTO: 7.18 K/UL — SIGNIFICANT CHANGE UP (ref 3.8–10.5)
WBC # FLD AUTO: 7.23 K/UL — SIGNIFICANT CHANGE UP (ref 3.8–10.5)
WBC # FLD AUTO: 7.91 K/UL — SIGNIFICANT CHANGE UP (ref 3.8–10.5)
WBC # FLD AUTO: 8.79 K/UL — SIGNIFICANT CHANGE UP (ref 3.8–10.5)
WBC # FLD AUTO: 8.82 K/UL — SIGNIFICANT CHANGE UP (ref 3.8–10.5)
WBC # FLD AUTO: 9.31 K/UL — SIGNIFICANT CHANGE UP (ref 3.8–10.5)

## 2021-01-01 PROCEDURE — 82962 GLUCOSE BLOOD TEST: CPT

## 2021-01-01 PROCEDURE — 86769 SARS-COV-2 COVID-19 ANTIBODY: CPT

## 2021-01-01 PROCEDURE — 71045 X-RAY EXAM CHEST 1 VIEW: CPT

## 2021-01-01 PROCEDURE — 83735 ASSAY OF MAGNESIUM: CPT

## 2021-01-01 PROCEDURE — 99233 SBSQ HOSP IP/OBS HIGH 50: CPT

## 2021-01-01 PROCEDURE — 99285 EMERGENCY DEPT VISIT HI MDM: CPT | Mod: 25

## 2021-01-01 PROCEDURE — 88342 IMHCHEM/IMCYTCHM 1ST ANTB: CPT

## 2021-01-01 PROCEDURE — 93306 TTE W/DOPPLER COMPLETE: CPT | Mod: 26

## 2021-01-01 PROCEDURE — 85730 THROMBOPLASTIN TIME PARTIAL: CPT

## 2021-01-01 PROCEDURE — 88305 TISSUE EXAM BY PATHOLOGIST: CPT | Mod: 26

## 2021-01-01 PROCEDURE — 99223 1ST HOSP IP/OBS HIGH 75: CPT | Mod: GC,25

## 2021-01-01 PROCEDURE — 72125 CT NECK SPINE W/O DYE: CPT

## 2021-01-01 PROCEDURE — 80053 COMPREHEN METABOLIC PANEL: CPT

## 2021-01-01 PROCEDURE — 99261: CPT

## 2021-01-01 PROCEDURE — 99285 EMERGENCY DEPT VISIT HI MDM: CPT

## 2021-01-01 PROCEDURE — 99223 1ST HOSP IP/OBS HIGH 75: CPT

## 2021-01-01 PROCEDURE — 93970 EXTREMITY STUDY: CPT

## 2021-01-01 PROCEDURE — 93926 LOWER EXTREMITY STUDY: CPT

## 2021-01-01 PROCEDURE — 70450 CT HEAD/BRAIN W/O DYE: CPT

## 2021-01-01 PROCEDURE — 83036 HEMOGLOBIN GLYCOSYLATED A1C: CPT

## 2021-01-01 PROCEDURE — 86850 RBC ANTIBODY SCREEN: CPT

## 2021-01-01 PROCEDURE — 70450 CT HEAD/BRAIN W/O DYE: CPT | Mod: 26,ME

## 2021-01-01 PROCEDURE — 93926 LOWER EXTREMITY STUDY: CPT | Mod: 26,RT

## 2021-01-01 PROCEDURE — 86923 COMPATIBILITY TEST ELECTRIC: CPT

## 2021-01-01 PROCEDURE — 71045 X-RAY EXAM CHEST 1 VIEW: CPT | Mod: 26

## 2021-01-01 PROCEDURE — 85027 COMPLETE CBC AUTOMATED: CPT

## 2021-01-01 PROCEDURE — 90937 HEMODIALYSIS REPEATED EVAL: CPT

## 2021-01-01 PROCEDURE — G2066: CPT

## 2021-01-01 PROCEDURE — 83690 ASSAY OF LIPASE: CPT

## 2021-01-01 PROCEDURE — 83550 IRON BINDING TEST: CPT

## 2021-01-01 PROCEDURE — 99497 ADVNCD CARE PLAN 30 MIN: CPT | Mod: 25

## 2021-01-01 PROCEDURE — 93970 EXTREMITY STUDY: CPT | Mod: 26

## 2021-01-01 PROCEDURE — 82272 OCCULT BLD FECES 1-3 TESTS: CPT

## 2021-01-01 PROCEDURE — 70450 CT HEAD/BRAIN W/O DYE: CPT | Mod: ME

## 2021-01-01 PROCEDURE — 99284 EMERGENCY DEPT VISIT MOD MDM: CPT | Mod: 25

## 2021-01-01 PROCEDURE — 72125 CT NECK SPINE W/O DYE: CPT | Mod: 26

## 2021-01-01 PROCEDURE — 83605 ASSAY OF LACTIC ACID: CPT

## 2021-01-01 PROCEDURE — 93298 REM INTERROG DEV EVAL SCRMS: CPT

## 2021-01-01 PROCEDURE — 99239 HOSP IP/OBS DSCHRG MGMT >30: CPT

## 2021-01-01 PROCEDURE — 20610 DRAIN/INJ JOINT/BURSA W/O US: CPT | Mod: LT

## 2021-01-01 PROCEDURE — 88342 IMHCHEM/IMCYTCHM 1ST ANTB: CPT | Mod: 26

## 2021-01-01 PROCEDURE — 83540 ASSAY OF IRON: CPT

## 2021-01-01 PROCEDURE — 87637 SARSCOV2&INF A&B&RSV AMP PRB: CPT

## 2021-01-01 PROCEDURE — P9040: CPT

## 2021-01-01 PROCEDURE — 36415 COLL VENOUS BLD VENIPUNCTURE: CPT

## 2021-01-01 PROCEDURE — 87640 STAPH A DNA AMP PROBE: CPT

## 2021-01-01 PROCEDURE — G1004: CPT

## 2021-01-01 PROCEDURE — 87641 MR-STAPH DNA AMP PROBE: CPT

## 2021-01-01 PROCEDURE — 85025 COMPLETE CBC W/AUTO DIFF WBC: CPT

## 2021-01-01 PROCEDURE — 82728 ASSAY OF FERRITIN: CPT

## 2021-01-01 PROCEDURE — 73562 X-RAY EXAM OF KNEE 3: CPT | Mod: 26,LT

## 2021-01-01 PROCEDURE — 74176 CT ABD & PELVIS W/O CONTRAST: CPT | Mod: 26

## 2021-01-01 PROCEDURE — 93971 EXTREMITY STUDY: CPT

## 2021-01-01 PROCEDURE — 84466 ASSAY OF TRANSFERRIN: CPT

## 2021-01-01 PROCEDURE — 36430 TRANSFUSION BLD/BLD COMPNT: CPT

## 2021-01-01 PROCEDURE — 93298 REM INTERROG DEV EVAL SCRMS: CPT | Mod: NC

## 2021-01-01 PROCEDURE — 88305 TISSUE EXAM BY PATHOLOGIST: CPT

## 2021-01-01 PROCEDURE — 85610 PROTHROMBIN TIME: CPT

## 2021-01-01 PROCEDURE — 99221 1ST HOSP IP/OBS SF/LOW 40: CPT

## 2021-01-01 PROCEDURE — 99232 SBSQ HOSP IP/OBS MODERATE 35: CPT

## 2021-01-01 PROCEDURE — 97163 PT EVAL HIGH COMPLEX 45 MIN: CPT

## 2021-01-01 PROCEDURE — 99221 1ST HOSP IP/OBS SF/LOW 40: CPT | Mod: 25

## 2021-01-01 PROCEDURE — 84484 ASSAY OF TROPONIN QUANT: CPT

## 2021-01-01 PROCEDURE — 43235 EGD DIAGNOSTIC BRUSH WASH: CPT

## 2021-01-01 PROCEDURE — 70450 CT HEAD/BRAIN W/O DYE: CPT | Mod: 26

## 2021-01-01 PROCEDURE — 73562 X-RAY EXAM OF KNEE 3: CPT

## 2021-01-01 PROCEDURE — 93010 ELECTROCARDIOGRAM REPORT: CPT

## 2021-01-01 PROCEDURE — 85018 HEMOGLOBIN: CPT

## 2021-01-01 PROCEDURE — U0005: CPT

## 2021-01-01 PROCEDURE — 93005 ELECTROCARDIOGRAM TRACING: CPT

## 2021-01-01 PROCEDURE — 86901 BLOOD TYPING SEROLOGIC RH(D): CPT

## 2021-01-01 PROCEDURE — U0003: CPT

## 2021-01-01 PROCEDURE — 93306 TTE W/DOPPLER COMPLETE: CPT

## 2021-01-01 PROCEDURE — 96374 THER/PROPH/DIAG INJ IV PUSH: CPT

## 2021-01-01 PROCEDURE — 85014 HEMATOCRIT: CPT

## 2021-01-01 PROCEDURE — 90937 HEMODIALYSIS REPEATED EVAL: CPT | Mod: GC

## 2021-01-01 PROCEDURE — 74176 CT ABD & PELVIS W/O CONTRAST: CPT

## 2021-01-01 PROCEDURE — 86900 BLOOD TYPING SEROLOGIC ABO: CPT

## 2021-01-01 PROCEDURE — 93971 EXTREMITY STUDY: CPT | Mod: 26,LT

## 2021-01-01 PROCEDURE — 84100 ASSAY OF PHOSPHORUS: CPT

## 2021-01-01 PROCEDURE — 80048 BASIC METABOLIC PNL TOTAL CA: CPT

## 2021-01-01 PROCEDURE — 0225U NFCT DS DNA&RNA 21 SARSCOV2: CPT

## 2021-01-01 RX ORDER — PANTOPRAZOLE SODIUM 20 MG/1
1 TABLET, DELAYED RELEASE ORAL
Qty: 60 | Refills: 0
Start: 2021-01-01 | End: 2021-01-01

## 2021-01-01 RX ORDER — ATORVASTATIN CALCIUM 80 MG/1
80 TABLET, FILM COATED ORAL AT BEDTIME
Refills: 0 | Status: DISCONTINUED | OUTPATIENT
Start: 2021-01-01 | End: 2021-01-01

## 2021-01-01 RX ORDER — DULOXETINE HYDROCHLORIDE 30 MG/1
60 CAPSULE, DELAYED RELEASE ORAL DAILY
Refills: 0 | Status: DISCONTINUED | OUTPATIENT
Start: 2021-01-01 | End: 2021-01-01

## 2021-01-01 RX ORDER — NIFEDIPINE 30 MG
30 TABLET, EXTENDED RELEASE 24 HR ORAL ONCE
Refills: 0 | Status: COMPLETED | OUTPATIENT
Start: 2021-01-01 | End: 2021-01-01

## 2021-01-01 RX ORDER — DARBEPOETIN ALFA IN POLYSORBAT 200MCG/0.4
100 PEN INJECTOR (ML) SUBCUTANEOUS ONCE
Refills: 0 | Status: DISCONTINUED | OUTPATIENT
Start: 2021-01-01 | End: 2021-01-01

## 2021-01-01 RX ORDER — NIFEDIPINE 30 MG
90 TABLET, EXTENDED RELEASE 24 HR ORAL DAILY
Refills: 0 | Status: DISCONTINUED | OUTPATIENT
Start: 2021-01-01 | End: 2021-01-01

## 2021-01-01 RX ORDER — ERYTHROPOIETIN 10000 [IU]/ML
10000 INJECTION, SOLUTION INTRAVENOUS; SUBCUTANEOUS
Refills: 0 | Status: DISCONTINUED | OUTPATIENT
Start: 2021-01-01 | End: 2021-01-01

## 2021-01-01 RX ORDER — NIFEDIPINE 30 MG
60 TABLET, EXTENDED RELEASE 24 HR ORAL AT BEDTIME
Refills: 0 | Status: DISCONTINUED | OUTPATIENT
Start: 2021-01-01 | End: 2021-01-01

## 2021-01-01 RX ORDER — PANTOPRAZOLE SODIUM 20 MG/1
80 TABLET, DELAYED RELEASE ORAL ONCE
Refills: 0 | Status: COMPLETED | OUTPATIENT
Start: 2021-01-01 | End: 2021-01-01

## 2021-01-01 RX ORDER — CHLORHEXIDINE GLUCONATE 213 G/1000ML
1 SOLUTION TOPICAL
Refills: 0 | Status: DISCONTINUED | OUTPATIENT
Start: 2021-01-01 | End: 2021-01-01

## 2021-01-01 RX ORDER — TAMSULOSIN HYDROCHLORIDE 0.4 MG/1
0.4 CAPSULE ORAL AT BEDTIME
Refills: 0 | Status: DISCONTINUED | OUTPATIENT
Start: 2021-01-01 | End: 2021-01-01

## 2021-01-01 RX ORDER — DULOXETINE HYDROCHLORIDE 30 MG/1
30 CAPSULE, DELAYED RELEASE ORAL DAILY
Refills: 0 | Status: DISCONTINUED | OUTPATIENT
Start: 2021-01-01 | End: 2021-01-01

## 2021-01-01 RX ORDER — DULOXETINE HYDROCHLORIDE 30 MG/1
1 CAPSULE, DELAYED RELEASE ORAL
Qty: 30 | Refills: 0
Start: 2021-01-01 | End: 2021-01-01

## 2021-01-01 RX ORDER — FUROSEMIDE 40 MG
20 TABLET ORAL ONCE
Refills: 0 | Status: COMPLETED | OUTPATIENT
Start: 2021-01-01 | End: 2021-01-01

## 2021-01-01 RX ORDER — HEPARIN SODIUM 5000 [USP'U]/ML
5000 INJECTION INTRAVENOUS; SUBCUTANEOUS EVERY 12 HOURS
Refills: 0 | Status: DISCONTINUED | OUTPATIENT
Start: 2021-01-01 | End: 2021-01-01

## 2021-01-01 RX ORDER — PANTOPRAZOLE SODIUM 20 MG/1
40 TABLET, DELAYED RELEASE ORAL
Refills: 0 | Status: DISCONTINUED | OUTPATIENT
Start: 2021-01-01 | End: 2021-01-01

## 2021-01-01 RX ORDER — SEVELAMER CARBONATE 800 MG/1
800 TABLET, FILM COATED ORAL
Qty: 60 | Refills: 3 | Status: DISCONTINUED | COMMUNITY
Start: 2021-01-01 | End: 2021-01-01

## 2021-01-01 RX ORDER — HEPARIN SODIUM 5000 [USP'U]/ML
5000 INJECTION INTRAVENOUS; SUBCUTANEOUS
Qty: 60 | Refills: 0
Start: 2021-01-01 | End: 2021-01-01

## 2021-01-01 RX ORDER — POTASSIUM CHLORIDE 20 MEQ
10 PACKET (EA) ORAL
Refills: 0 | Status: COMPLETED | OUTPATIENT
Start: 2021-01-01 | End: 2021-01-01

## 2021-01-01 RX ORDER — ISOSORBIDE MONONITRATE 60 MG/1
30 TABLET, EXTENDED RELEASE ORAL DAILY
Refills: 0 | Status: DISCONTINUED | OUTPATIENT
Start: 2021-01-01 | End: 2021-01-01

## 2021-01-01 RX ORDER — ERYTHROPOIETIN 10000 [IU]/ML
10000 INJECTION, SOLUTION INTRAVENOUS; SUBCUTANEOUS ONCE
Refills: 0 | Status: COMPLETED | OUTPATIENT
Start: 2021-01-01 | End: 2021-01-01

## 2021-01-01 RX ORDER — ATORVASTATIN CALCIUM 80 MG/1
1 TABLET, FILM COATED ORAL
Qty: 0 | Refills: 0 | DISCHARGE
Start: 2021-01-01

## 2021-01-01 RX ORDER — IRON SUCROSE 20 MG/ML
200 INJECTION, SOLUTION INTRAVENOUS ONCE
Refills: 0 | Status: COMPLETED | OUTPATIENT
Start: 2021-01-01 | End: 2021-01-01

## 2021-01-01 RX ORDER — INFLUENZA VIRUS VACCINE 15; 15; 15; 15 UG/.5ML; UG/.5ML; UG/.5ML; UG/.5ML
0.7 SUSPENSION INTRAMUSCULAR ONCE
Refills: 0 | Status: DISCONTINUED | OUTPATIENT
Start: 2021-01-01 | End: 2021-01-01

## 2021-01-01 RX ORDER — LANOLIN ALCOHOL/MO/W.PET/CERES
1 CREAM (GRAM) TOPICAL AT BEDTIME
Refills: 0 | Status: DISCONTINUED | OUTPATIENT
Start: 2021-01-01 | End: 2021-01-01

## 2021-01-01 RX ORDER — TAMSULOSIN HYDROCHLORIDE 0.4 MG/1
1 CAPSULE ORAL
Qty: 30 | Refills: 0
Start: 2021-01-01 | End: 2021-01-01

## 2021-01-01 RX ORDER — ENOXAPARIN SODIUM 100 MG/ML
65 INJECTION SUBCUTANEOUS
Refills: 0 | Status: DISCONTINUED | OUTPATIENT
Start: 2021-01-01 | End: 2021-01-01

## 2021-01-01 RX ORDER — DULOXETINE HYDROCHLORIDE 30 MG/1
90 CAPSULE, DELAYED RELEASE ORAL DAILY
Refills: 0 | Status: DISCONTINUED | OUTPATIENT
Start: 2021-01-01 | End: 2021-01-01

## 2021-01-01 RX ORDER — IRON SUCROSE 20 MG/ML
100 INJECTION, SOLUTION INTRAVENOUS ONCE
Refills: 0 | Status: COMPLETED | OUTPATIENT
Start: 2021-01-01 | End: 2021-01-01

## 2021-01-01 RX ORDER — DULOXETINE HYDROCHLORIDE 30 MG/1
1 CAPSULE, DELAYED RELEASE ORAL
Qty: 30 | Refills: 0
Start: 2021-01-01

## 2021-01-01 RX ORDER — PANTOPRAZOLE SODIUM 20 MG/1
40 TABLET, DELAYED RELEASE ORAL ONCE
Refills: 0 | Status: COMPLETED | OUTPATIENT
Start: 2021-01-01 | End: 2021-01-01

## 2021-01-01 RX ORDER — SEVELAMER CARBONATE 2400 MG/1
800 POWDER, FOR SUSPENSION ORAL THREE TIMES A DAY
Refills: 0 | Status: DISCONTINUED | OUTPATIENT
Start: 2021-01-01 | End: 2021-01-01

## 2021-01-01 RX ADMIN — Medication 1 MILLIGRAM(S): at 22:08

## 2021-01-01 RX ADMIN — SEVELAMER CARBONATE 800 MILLIGRAM(S): 2400 POWDER, FOR SUSPENSION ORAL at 23:39

## 2021-01-01 RX ADMIN — PANTOPRAZOLE SODIUM 40 MILLIGRAM(S): 20 TABLET, DELAYED RELEASE ORAL at 05:42

## 2021-01-01 RX ADMIN — ISOSORBIDE MONONITRATE 30 MILLIGRAM(S): 60 TABLET, EXTENDED RELEASE ORAL at 11:22

## 2021-01-01 RX ADMIN — TAMSULOSIN HYDROCHLORIDE 0.4 MILLIGRAM(S): 0.4 CAPSULE ORAL at 21:28

## 2021-01-01 RX ADMIN — Medication 100 MILLIEQUIVALENT(S): at 15:54

## 2021-01-01 RX ADMIN — Medication 90 MILLIGRAM(S): at 05:46

## 2021-01-01 RX ADMIN — DULOXETINE HYDROCHLORIDE 60 MILLIGRAM(S): 30 CAPSULE, DELAYED RELEASE ORAL at 15:59

## 2021-01-01 RX ADMIN — PANTOPRAZOLE SODIUM 40 MILLIGRAM(S): 20 TABLET, DELAYED RELEASE ORAL at 05:59

## 2021-01-01 RX ADMIN — HEPARIN SODIUM 5000 UNIT(S): 5000 INJECTION INTRAVENOUS; SUBCUTANEOUS at 17:13

## 2021-01-01 RX ADMIN — ATORVASTATIN CALCIUM 80 MILLIGRAM(S): 80 TABLET, FILM COATED ORAL at 21:13

## 2021-01-01 RX ADMIN — SEVELAMER CARBONATE 800 MILLIGRAM(S): 2400 POWDER, FOR SUSPENSION ORAL at 05:07

## 2021-01-01 RX ADMIN — TAMSULOSIN HYDROCHLORIDE 0.4 MILLIGRAM(S): 0.4 CAPSULE ORAL at 21:13

## 2021-01-01 RX ADMIN — Medication 60 MILLIGRAM(S): at 23:39

## 2021-01-01 RX ADMIN — ISOSORBIDE MONONITRATE 30 MILLIGRAM(S): 60 TABLET, EXTENDED RELEASE ORAL at 15:59

## 2021-01-01 RX ADMIN — Medication 90 MILLIGRAM(S): at 05:14

## 2021-01-01 RX ADMIN — DULOXETINE HYDROCHLORIDE 60 MILLIGRAM(S): 30 CAPSULE, DELAYED RELEASE ORAL at 11:22

## 2021-01-01 RX ADMIN — DULOXETINE HYDROCHLORIDE 60 MILLIGRAM(S): 30 CAPSULE, DELAYED RELEASE ORAL at 12:48

## 2021-01-01 RX ADMIN — ATORVASTATIN CALCIUM 80 MILLIGRAM(S): 80 TABLET, FILM COATED ORAL at 22:14

## 2021-01-01 RX ADMIN — HEPARIN SODIUM 5000 UNIT(S): 5000 INJECTION INTRAVENOUS; SUBCUTANEOUS at 18:20

## 2021-01-01 RX ADMIN — SEVELAMER CARBONATE 800 MILLIGRAM(S): 2400 POWDER, FOR SUSPENSION ORAL at 22:29

## 2021-01-01 RX ADMIN — PANTOPRAZOLE SODIUM 80 MILLIGRAM(S): 20 TABLET, DELAYED RELEASE ORAL at 16:46

## 2021-01-01 RX ADMIN — Medication 20 MILLIGRAM(S): at 05:46

## 2021-01-01 RX ADMIN — CHLORHEXIDINE GLUCONATE 1 APPLICATION(S): 213 SOLUTION TOPICAL at 05:46

## 2021-01-01 RX ADMIN — ATORVASTATIN CALCIUM 80 MILLIGRAM(S): 80 TABLET, FILM COATED ORAL at 21:28

## 2021-01-01 RX ADMIN — Medication 1 TABLET(S): at 12:56

## 2021-01-01 RX ADMIN — Medication 90 MILLIGRAM(S): at 05:08

## 2021-01-01 RX ADMIN — SEVELAMER CARBONATE 800 MILLIGRAM(S): 2400 POWDER, FOR SUSPENSION ORAL at 12:56

## 2021-01-01 RX ADMIN — HEPARIN SODIUM 5000 UNIT(S): 5000 INJECTION INTRAVENOUS; SUBCUTANEOUS at 05:12

## 2021-01-01 RX ADMIN — IRON SUCROSE 100 MILLIGRAM(S): 20 INJECTION, SOLUTION INTRAVENOUS at 15:01

## 2021-01-01 RX ADMIN — PANTOPRAZOLE SODIUM 40 MILLIGRAM(S): 20 TABLET, DELAYED RELEASE ORAL at 06:00

## 2021-01-01 RX ADMIN — ISOSORBIDE MONONITRATE 30 MILLIGRAM(S): 60 TABLET, EXTENDED RELEASE ORAL at 17:05

## 2021-01-01 RX ADMIN — SEVELAMER CARBONATE 800 MILLIGRAM(S): 2400 POWDER, FOR SUSPENSION ORAL at 15:58

## 2021-01-01 RX ADMIN — TAMSULOSIN HYDROCHLORIDE 0.4 MILLIGRAM(S): 0.4 CAPSULE ORAL at 22:14

## 2021-01-01 RX ADMIN — Medication 20 MILLIGRAM(S): at 05:14

## 2021-01-01 RX ADMIN — Medication 90 MILLIGRAM(S): at 05:58

## 2021-01-01 RX ADMIN — PANTOPRAZOLE SODIUM 40 MILLIGRAM(S): 20 TABLET, DELAYED RELEASE ORAL at 05:46

## 2021-01-01 RX ADMIN — Medication 30 MILLIGRAM(S): at 17:59

## 2021-01-01 RX ADMIN — Medication 20 MILLIGRAM(S): at 05:42

## 2021-01-01 RX ADMIN — Medication 60 MILLIGRAM(S): at 22:08

## 2021-01-01 RX ADMIN — Medication 90 MILLIGRAM(S): at 05:42

## 2021-01-01 RX ADMIN — ISOSORBIDE MONONITRATE 30 MILLIGRAM(S): 60 TABLET, EXTENDED RELEASE ORAL at 12:48

## 2021-01-01 RX ADMIN — SEVELAMER CARBONATE 800 MILLIGRAM(S): 2400 POWDER, FOR SUSPENSION ORAL at 05:59

## 2021-01-01 RX ADMIN — TAMSULOSIN HYDROCHLORIDE 0.4 MILLIGRAM(S): 0.4 CAPSULE ORAL at 17:13

## 2021-01-01 RX ADMIN — Medication 90 MILLIGRAM(S): at 04:29

## 2021-01-01 RX ADMIN — IRON SUCROSE 110 MILLIGRAM(S): 20 INJECTION, SOLUTION INTRAVENOUS at 11:37

## 2021-01-01 RX ADMIN — ATORVASTATIN CALCIUM 80 MILLIGRAM(S): 80 TABLET, FILM COATED ORAL at 23:40

## 2021-01-01 RX ADMIN — ERYTHROPOIETIN 10000 UNIT(S): 10000 INJECTION, SOLUTION INTRAVENOUS; SUBCUTANEOUS at 11:34

## 2021-01-01 RX ADMIN — ISOSORBIDE MONONITRATE 30 MILLIGRAM(S): 60 TABLET, EXTENDED RELEASE ORAL at 12:56

## 2021-01-01 RX ADMIN — ISOSORBIDE MONONITRATE 30 MILLIGRAM(S): 60 TABLET, EXTENDED RELEASE ORAL at 13:42

## 2021-01-01 RX ADMIN — ERYTHROPOIETIN 10000 UNIT(S): 10000 INJECTION, SOLUTION INTRAVENOUS; SUBCUTANEOUS at 13:49

## 2021-01-01 RX ADMIN — Medication 1 TABLET(S): at 17:06

## 2021-01-01 RX ADMIN — IRON SUCROSE 110 MILLIGRAM(S): 20 INJECTION, SOLUTION INTRAVENOUS at 23:09

## 2021-01-01 RX ADMIN — Medication 100 MILLIEQUIVALENT(S): at 09:04

## 2021-01-01 RX ADMIN — PANTOPRAZOLE SODIUM 40 MILLIGRAM(S): 20 TABLET, DELAYED RELEASE ORAL at 05:08

## 2021-01-01 RX ADMIN — Medication 20 MILLIGRAM(S): at 10:34

## 2021-01-01 RX ADMIN — ATORVASTATIN CALCIUM 80 MILLIGRAM(S): 80 TABLET, FILM COATED ORAL at 17:14

## 2021-01-01 RX ADMIN — CHLORHEXIDINE GLUCONATE 1 APPLICATION(S): 213 SOLUTION TOPICAL at 13:42

## 2021-01-01 RX ADMIN — DULOXETINE HYDROCHLORIDE 60 MILLIGRAM(S): 30 CAPSULE, DELAYED RELEASE ORAL at 12:56

## 2021-01-01 RX ADMIN — Medication 20 MILLIGRAM(S): at 15:58

## 2021-01-01 RX ADMIN — CHLORHEXIDINE GLUCONATE 1 APPLICATION(S): 213 SOLUTION TOPICAL at 05:14

## 2021-01-01 RX ADMIN — DULOXETINE HYDROCHLORIDE 60 MILLIGRAM(S): 30 CAPSULE, DELAYED RELEASE ORAL at 15:22

## 2021-01-01 RX ADMIN — Medication 1 MILLIGRAM(S): at 23:39

## 2021-01-01 RX ADMIN — ATORVASTATIN CALCIUM 80 MILLIGRAM(S): 80 TABLET, FILM COATED ORAL at 22:08

## 2021-01-01 RX ADMIN — PANTOPRAZOLE SODIUM 40 MILLIGRAM(S): 20 TABLET, DELAYED RELEASE ORAL at 04:29

## 2021-01-01 NOTE — DISCHARGE NOTE ADULT - SECONDARY DIAGNOSIS.
verbalizes understanding/needs met Diastolic heart failure, unspecified heart failure chronicity Acute pain of right shoulder Essential hypertension DM (diabetes mellitus)

## 2021-01-04 ENCOUNTER — APPOINTMENT (OUTPATIENT)
Dept: ELECTROPHYSIOLOGY | Facility: CLINIC | Age: 86
End: 2021-01-04
Payer: MEDICARE

## 2021-01-04 DIAGNOSIS — N18.4 CHRONIC KIDNEY DISEASE, STAGE 4 (SEVERE): ICD-10-CM

## 2021-01-04 PROCEDURE — G2066: CPT

## 2021-01-04 PROCEDURE — 93298 REM INTERROG DEV EVAL SCRMS: CPT

## 2021-01-07 ENCOUNTER — APPOINTMENT (OUTPATIENT)
Dept: HEMATOLOGY ONCOLOGY | Facility: CLINIC | Age: 86
End: 2021-01-07

## 2021-01-14 ENCOUNTER — APPOINTMENT (OUTPATIENT)
Age: 86
End: 2021-01-14

## 2021-01-14 ENCOUNTER — APPOINTMENT (OUTPATIENT)
Dept: HEMATOLOGY ONCOLOGY | Facility: CLINIC | Age: 86
End: 2021-01-14

## 2021-01-14 ENCOUNTER — RESULT REVIEW (OUTPATIENT)
Age: 86
End: 2021-01-14

## 2021-01-14 LAB
BASOPHILS # BLD AUTO: 0.1 K/UL — SIGNIFICANT CHANGE UP (ref 0–0.2)
BASOPHILS NFR BLD AUTO: 1.4 % — SIGNIFICANT CHANGE UP (ref 0–2)
EOSINOPHIL # BLD AUTO: 0.2 K/UL — SIGNIFICANT CHANGE UP (ref 0–0.5)
EOSINOPHIL NFR BLD AUTO: 2.6 % — SIGNIFICANT CHANGE UP (ref 0–6)
HCT VFR BLD CALC: 29.4 % — LOW (ref 39–50)
HGB BLD-MCNC: 9.1 G/DL — LOW (ref 13–17)
LYMPHOCYTES # BLD AUTO: 0.6 K/UL — LOW (ref 1–3.3)
LYMPHOCYTES # BLD AUTO: 8.7 % — LOW (ref 13–44)
MCHC RBC-ENTMCNC: 28.8 PG — SIGNIFICANT CHANGE UP (ref 27–34)
MCHC RBC-ENTMCNC: 31.1 G/DL — LOW (ref 32–36)
MCV RBC AUTO: 92.8 FL — SIGNIFICANT CHANGE UP (ref 80–100)
MONOCYTES # BLD AUTO: 0.6 K/UL — SIGNIFICANT CHANGE UP (ref 0–0.9)
MONOCYTES NFR BLD AUTO: 8 % — SIGNIFICANT CHANGE UP (ref 2–14)
NEUTROPHILS # BLD AUTO: 5.7 K/UL — SIGNIFICANT CHANGE UP (ref 1.8–7.4)
NEUTROPHILS NFR BLD AUTO: 79.3 % — HIGH (ref 43–77)
PLATELET # BLD AUTO: 179 K/UL — SIGNIFICANT CHANGE UP (ref 150–400)
RBC # BLD: 3.17 M/UL — LOW (ref 4.2–5.8)
RBC # FLD: 18.1 % — HIGH (ref 10.3–14.5)
WBC # BLD: 7.2 K/UL — SIGNIFICANT CHANGE UP (ref 3.8–10.5)
WBC # FLD AUTO: 7.2 K/UL — SIGNIFICANT CHANGE UP (ref 3.8–10.5)

## 2021-01-21 ENCOUNTER — APPOINTMENT (OUTPATIENT)
Dept: HEMATOLOGY ONCOLOGY | Facility: CLINIC | Age: 86
End: 2021-01-21

## 2021-01-21 ENCOUNTER — RESULT REVIEW (OUTPATIENT)
Age: 86
End: 2021-01-21

## 2021-01-21 LAB
BASOPHILS # BLD AUTO: 0.1 K/UL — SIGNIFICANT CHANGE UP (ref 0–0.2)
BASOPHILS NFR BLD AUTO: 1.6 % — SIGNIFICANT CHANGE UP (ref 0–2)
EOSINOPHIL # BLD AUTO: 0.2 K/UL — SIGNIFICANT CHANGE UP (ref 0–0.5)
EOSINOPHIL NFR BLD AUTO: 3.4 % — SIGNIFICANT CHANGE UP (ref 0–6)
HCT VFR BLD CALC: 34.6 % — LOW (ref 39–50)
HGB BLD-MCNC: 10.4 G/DL — LOW (ref 13–17)
LYMPHOCYTES # BLD AUTO: 0.6 K/UL — LOW (ref 1–3.3)
LYMPHOCYTES # BLD AUTO: 9.2 % — LOW (ref 13–44)
MCHC RBC-ENTMCNC: 28.6 PG — SIGNIFICANT CHANGE UP (ref 27–34)
MCHC RBC-ENTMCNC: 30 G/DL — LOW (ref 32–36)
MCV RBC AUTO: 95.4 FL — SIGNIFICANT CHANGE UP (ref 80–100)
MONOCYTES # BLD AUTO: 0.6 K/UL — SIGNIFICANT CHANGE UP (ref 0–0.9)
MONOCYTES NFR BLD AUTO: 8.2 % — SIGNIFICANT CHANGE UP (ref 2–14)
NEUTROPHILS # BLD AUTO: 5.4 K/UL — SIGNIFICANT CHANGE UP (ref 1.8–7.4)
NEUTROPHILS NFR BLD AUTO: 77.6 % — HIGH (ref 43–77)
PLATELET # BLD AUTO: 243 K/UL — SIGNIFICANT CHANGE UP (ref 150–400)
RBC # BLD: 3.62 M/UL — LOW (ref 4.2–5.8)
RBC # FLD: 18.4 % — HIGH (ref 10.3–14.5)
WBC # BLD: 6.9 K/UL — SIGNIFICANT CHANGE UP (ref 3.8–10.5)
WBC # FLD AUTO: 6.9 K/UL — SIGNIFICANT CHANGE UP (ref 3.8–10.5)

## 2021-01-28 ENCOUNTER — APPOINTMENT (OUTPATIENT)
Dept: VASCULAR SURGERY | Facility: CLINIC | Age: 86
End: 2021-01-28
Payer: MEDICARE

## 2021-01-28 ENCOUNTER — NON-APPOINTMENT (OUTPATIENT)
Age: 86
End: 2021-01-28

## 2021-01-28 ENCOUNTER — RESULT REVIEW (OUTPATIENT)
Age: 86
End: 2021-01-28

## 2021-01-28 ENCOUNTER — OUTPATIENT (OUTPATIENT)
Dept: OUTPATIENT SERVICES | Facility: HOSPITAL | Age: 86
LOS: 1 days | Discharge: HOME | End: 2021-01-28

## 2021-01-28 ENCOUNTER — APPOINTMENT (OUTPATIENT)
Dept: HEMATOLOGY ONCOLOGY | Facility: CLINIC | Age: 86
End: 2021-01-28

## 2021-01-28 VITALS
SYSTOLIC BLOOD PRESSURE: 161 MMHG | TEMPERATURE: 97.8 F | BODY MASS INDEX: 24.66 KG/M2 | WEIGHT: 148 LBS | DIASTOLIC BLOOD PRESSURE: 68 MMHG | OXYGEN SATURATION: 92 % | HEART RATE: 82 BPM | HEIGHT: 65 IN

## 2021-01-28 DIAGNOSIS — Z98.890 OTHER SPECIFIED POSTPROCEDURAL STATES: Chronic | ICD-10-CM

## 2021-01-28 DIAGNOSIS — Z98.62 PERIPHERAL VASCULAR ANGIOPLASTY STATUS: Chronic | ICD-10-CM

## 2021-01-28 DIAGNOSIS — D64.9 ANEMIA, UNSPECIFIED: ICD-10-CM

## 2021-01-28 DIAGNOSIS — Z95.2 PRESENCE OF PROSTHETIC HEART VALVE: Chronic | ICD-10-CM

## 2021-01-28 DIAGNOSIS — I77.0 ARTERIOVENOUS FISTULA, ACQUIRED: Chronic | ICD-10-CM

## 2021-01-28 LAB
BASOPHILS # BLD AUTO: 0.1 K/UL — SIGNIFICANT CHANGE UP (ref 0–0.2)
BASOPHILS NFR BLD AUTO: 1.4 % — SIGNIFICANT CHANGE UP (ref 0–2)
EOSINOPHIL # BLD AUTO: 0.2 K/UL — SIGNIFICANT CHANGE UP (ref 0–0.5)
EOSINOPHIL NFR BLD AUTO: 2 % — SIGNIFICANT CHANGE UP (ref 0–6)
HCT VFR BLD CALC: 37 % — LOW (ref 39–50)
HGB BLD-MCNC: 11.1 G/DL — LOW (ref 13–17)
LYMPHOCYTES # BLD AUTO: 0.6 K/UL — LOW (ref 1–3.3)
LYMPHOCYTES # BLD AUTO: 7.7 % — LOW (ref 13–44)
MCHC RBC-ENTMCNC: 28 PG — SIGNIFICANT CHANGE UP (ref 27–34)
MCHC RBC-ENTMCNC: 30 G/DL — LOW (ref 32–36)
MCV RBC AUTO: 93.2 FL — SIGNIFICANT CHANGE UP (ref 80–100)
MONOCYTES # BLD AUTO: 0.6 K/UL — SIGNIFICANT CHANGE UP (ref 0–0.9)
MONOCYTES NFR BLD AUTO: 7.2 % — SIGNIFICANT CHANGE UP (ref 2–14)
NEUTROPHILS # BLD AUTO: 6.8 K/UL — SIGNIFICANT CHANGE UP (ref 1.8–7.4)
NEUTROPHILS NFR BLD AUTO: 81.7 % — HIGH (ref 43–77)
PLATELET # BLD AUTO: 173 K/UL — SIGNIFICANT CHANGE UP (ref 150–400)
RBC # BLD: 3.97 M/UL — LOW (ref 4.2–5.8)
RBC # FLD: 17.6 % — HIGH (ref 10.3–14.5)
WBC # BLD: 8.3 K/UL — SIGNIFICANT CHANGE UP (ref 3.8–10.5)
WBC # FLD AUTO: 8.3 K/UL — SIGNIFICANT CHANGE UP (ref 3.8–10.5)

## 2021-01-28 PROCEDURE — XXXXX: CPT

## 2021-02-04 ENCOUNTER — RESULT REVIEW (OUTPATIENT)
Age: 86
End: 2021-02-04

## 2021-02-04 ENCOUNTER — APPOINTMENT (OUTPATIENT)
Dept: HEMATOLOGY ONCOLOGY | Facility: CLINIC | Age: 86
End: 2021-02-04

## 2021-02-04 ENCOUNTER — APPOINTMENT (OUTPATIENT)
Age: 86
End: 2021-02-04

## 2021-02-04 LAB
BASOPHILS # BLD AUTO: 0.1 K/UL — SIGNIFICANT CHANGE UP (ref 0–0.2)
BASOPHILS NFR BLD AUTO: 1.1 % — SIGNIFICANT CHANGE UP (ref 0–2)
EOSINOPHIL # BLD AUTO: 0.1 K/UL — SIGNIFICANT CHANGE UP (ref 0–0.5)
EOSINOPHIL NFR BLD AUTO: 2 % — SIGNIFICANT CHANGE UP (ref 0–6)
HCT VFR BLD CALC: 35.5 % — LOW (ref 39–50)
HGB BLD-MCNC: 10.5 G/DL — LOW (ref 13–17)
LYMPHOCYTES # BLD AUTO: 0.7 K/UL — LOW (ref 1–3.3)
LYMPHOCYTES # BLD AUTO: 12.6 % — LOW (ref 13–44)
MCHC RBC-ENTMCNC: 28 PG — SIGNIFICANT CHANGE UP (ref 27–34)
MCHC RBC-ENTMCNC: 29.5 G/DL — LOW (ref 32–36)
MCV RBC AUTO: 95.1 FL — SIGNIFICANT CHANGE UP (ref 80–100)
MONOCYTES # BLD AUTO: 0.5 K/UL — SIGNIFICANT CHANGE UP (ref 0–0.9)
MONOCYTES NFR BLD AUTO: 9.4 % — SIGNIFICANT CHANGE UP (ref 2–14)
NEUTROPHILS # BLD AUTO: 4.3 K/UL — SIGNIFICANT CHANGE UP (ref 1.8–7.4)
NEUTROPHILS NFR BLD AUTO: 74.8 % — SIGNIFICANT CHANGE UP (ref 43–77)
PLATELET # BLD AUTO: 182 K/UL — SIGNIFICANT CHANGE UP (ref 150–400)
RBC # BLD: 3.73 M/UL — LOW (ref 4.2–5.8)
RBC # FLD: 17.2 % — HIGH (ref 10.3–14.5)
WBC # BLD: 5.7 K/UL — SIGNIFICANT CHANGE UP (ref 3.8–10.5)
WBC # FLD AUTO: 5.7 K/UL — SIGNIFICANT CHANGE UP (ref 3.8–10.5)

## 2021-02-05 DIAGNOSIS — D63.1 ANEMIA IN CHRONIC KIDNEY DISEASE: ICD-10-CM

## 2021-02-05 DIAGNOSIS — N18.4 CHRONIC KIDNEY DISEASE, STAGE 4 (SEVERE): ICD-10-CM

## 2021-02-08 ENCOUNTER — APPOINTMENT (OUTPATIENT)
Dept: ELECTROPHYSIOLOGY | Facility: CLINIC | Age: 86
End: 2021-02-08
Payer: MEDICARE

## 2021-02-08 PROCEDURE — G2066: CPT

## 2021-02-08 PROCEDURE — 93298 REM INTERROG DEV EVAL SCRMS: CPT

## 2021-02-11 ENCOUNTER — RESULT REVIEW (OUTPATIENT)
Age: 86
End: 2021-02-11

## 2021-02-11 ENCOUNTER — APPOINTMENT (OUTPATIENT)
Dept: HEMATOLOGY ONCOLOGY | Facility: CLINIC | Age: 86
End: 2021-02-11

## 2021-02-11 LAB
BASOPHILS # BLD AUTO: 0.1 K/UL — SIGNIFICANT CHANGE UP (ref 0–0.2)
BASOPHILS NFR BLD AUTO: 1.4 % — SIGNIFICANT CHANGE UP (ref 0–2)
EOSINOPHIL # BLD AUTO: 0.2 K/UL — SIGNIFICANT CHANGE UP (ref 0–0.5)
EOSINOPHIL NFR BLD AUTO: 3.1 % — SIGNIFICANT CHANGE UP (ref 0–6)
HCT VFR BLD CALC: 33.8 % — LOW (ref 39–50)
HGB BLD-MCNC: 10.3 G/DL — LOW (ref 13–17)
LYMPHOCYTES # BLD AUTO: 0.8 K/UL — LOW (ref 1–3.3)
LYMPHOCYTES # BLD AUTO: 11.6 % — LOW (ref 13–44)
MCHC RBC-ENTMCNC: 29 PG — SIGNIFICANT CHANGE UP (ref 27–34)
MCHC RBC-ENTMCNC: 30.3 G/DL — LOW (ref 32–36)
MCV RBC AUTO: 95.8 FL — SIGNIFICANT CHANGE UP (ref 80–100)
MONOCYTES # BLD AUTO: 0.6 K/UL — SIGNIFICANT CHANGE UP (ref 0–0.9)
MONOCYTES NFR BLD AUTO: 9.7 % — SIGNIFICANT CHANGE UP (ref 2–14)
NEUTROPHILS # BLD AUTO: 4.8 K/UL — SIGNIFICANT CHANGE UP (ref 1.8–7.4)
NEUTROPHILS NFR BLD AUTO: 74.2 % — SIGNIFICANT CHANGE UP (ref 43–77)
PLATELET # BLD AUTO: 225 K/UL — SIGNIFICANT CHANGE UP (ref 150–400)
RBC # BLD: 3.53 M/UL — LOW (ref 4.2–5.8)
RBC # FLD: 18.4 % — HIGH (ref 10.3–14.5)
WBC # BLD: 6.5 K/UL — SIGNIFICANT CHANGE UP (ref 3.8–10.5)
WBC # FLD AUTO: 6.5 K/UL — SIGNIFICANT CHANGE UP (ref 3.8–10.5)

## 2021-02-17 ENCOUNTER — NON-APPOINTMENT (OUTPATIENT)
Age: 86
End: 2021-02-17

## 2021-02-22 ENCOUNTER — APPOINTMENT (OUTPATIENT)
Dept: HEMATOLOGY ONCOLOGY | Facility: CLINIC | Age: 86
End: 2021-02-22

## 2021-02-25 ENCOUNTER — RESULT REVIEW (OUTPATIENT)
Age: 86
End: 2021-02-25

## 2021-02-25 ENCOUNTER — APPOINTMENT (OUTPATIENT)
Dept: HEMATOLOGY ONCOLOGY | Facility: CLINIC | Age: 86
End: 2021-02-25

## 2021-02-25 ENCOUNTER — APPOINTMENT (OUTPATIENT)
Age: 86
End: 2021-02-25

## 2021-02-25 LAB
BASOPHILS # BLD AUTO: 0.1 K/UL — SIGNIFICANT CHANGE UP (ref 0–0.2)
BASOPHILS NFR BLD AUTO: 1.5 % — SIGNIFICANT CHANGE UP (ref 0–2)
EOSINOPHIL # BLD AUTO: 0.2 K/UL — SIGNIFICANT CHANGE UP (ref 0–0.5)
EOSINOPHIL NFR BLD AUTO: 2.5 % — SIGNIFICANT CHANGE UP (ref 0–6)
HCT VFR BLD CALC: 30.6 % — LOW (ref 39–50)
HGB BLD-MCNC: 9.2 G/DL — LOW (ref 13–17)
LYMPHOCYTES # BLD AUTO: 0.7 K/UL — LOW (ref 1–3.3)
LYMPHOCYTES # BLD AUTO: 9.7 % — LOW (ref 13–44)
MCHC RBC-ENTMCNC: 28.6 PG — SIGNIFICANT CHANGE UP (ref 27–34)
MCHC RBC-ENTMCNC: 30.2 G/DL — LOW (ref 32–36)
MCV RBC AUTO: 94.6 FL — SIGNIFICANT CHANGE UP (ref 80–100)
MONOCYTES # BLD AUTO: 0.6 K/UL — SIGNIFICANT CHANGE UP (ref 0–0.9)
MONOCYTES NFR BLD AUTO: 9 % — SIGNIFICANT CHANGE UP (ref 2–14)
NEUTROPHILS # BLD AUTO: 5.2 K/UL — SIGNIFICANT CHANGE UP (ref 1.8–7.4)
NEUTROPHILS NFR BLD AUTO: 77.2 % — HIGH (ref 43–77)
PLATELET # BLD AUTO: 203 K/UL — SIGNIFICANT CHANGE UP (ref 150–400)
RBC # BLD: 3.23 M/UL — LOW (ref 4.2–5.8)
RBC # FLD: 16.5 % — HIGH (ref 10.3–14.5)
WBC # BLD: 6.8 K/UL — SIGNIFICANT CHANGE UP (ref 3.8–10.5)
WBC # FLD AUTO: 6.8 K/UL — SIGNIFICANT CHANGE UP (ref 3.8–10.5)

## 2021-03-01 ENCOUNTER — OUTPATIENT (OUTPATIENT)
Dept: OUTPATIENT SERVICES | Facility: HOSPITAL | Age: 86
LOS: 1 days | Discharge: HOME | End: 2021-03-01

## 2021-03-01 DIAGNOSIS — Z95.2 PRESENCE OF PROSTHETIC HEART VALVE: Chronic | ICD-10-CM

## 2021-03-01 DIAGNOSIS — Z98.890 OTHER SPECIFIED POSTPROCEDURAL STATES: Chronic | ICD-10-CM

## 2021-03-01 DIAGNOSIS — Z98.62 PERIPHERAL VASCULAR ANGIOPLASTY STATUS: Chronic | ICD-10-CM

## 2021-03-01 DIAGNOSIS — D63.1 ANEMIA IN CHRONIC KIDNEY DISEASE: ICD-10-CM

## 2021-03-01 DIAGNOSIS — I77.0 ARTERIOVENOUS FISTULA, ACQUIRED: Chronic | ICD-10-CM

## 2021-03-04 ENCOUNTER — APPOINTMENT (OUTPATIENT)
Age: 86
End: 2021-03-04

## 2021-03-04 ENCOUNTER — APPOINTMENT (OUTPATIENT)
Dept: HEMATOLOGY ONCOLOGY | Facility: CLINIC | Age: 86
End: 2021-03-04

## 2021-03-04 ENCOUNTER — RESULT REVIEW (OUTPATIENT)
Age: 86
End: 2021-03-04

## 2021-03-05 DIAGNOSIS — N18.4 CHRONIC KIDNEY DISEASE, STAGE 4 (SEVERE): ICD-10-CM

## 2021-03-09 NOTE — DISCHARGE NOTE PROVIDER - NSDCHC_MEDRECSTATUS_GEN_ALL_CORE
[facility] [place] / [address] DISCHARGE INSTRUCTIONS    Beck Barrientos / [id number] : 1933    Admission date: 3/7/2021 Discharge date: 3/9/2021       Primary care provider: Johana Suresh MD    Discharging provider:  Ashanti Arechiga DO  - Family Medicine Resident  Dr. Rylee Zuluaga - Attending, Family Medicine   . . . . . . . . . . . . . . . . . . . . . . . . . . . . . . . . . . . . . . . . . . . . . . . . . . . . . . . . . . . . . . . . . . . . . . . Jimenez Hilt FINAL [place] a 80 Juventino Redmond      [facility] [facility] Representative Signature                                                          Date/Time Admission Reconciliation is Completed  Discharge Reconciliation is Completed

## 2021-03-11 ENCOUNTER — RESULT REVIEW (OUTPATIENT)
Age: 86
End: 2021-03-11

## 2021-03-11 ENCOUNTER — APPOINTMENT (OUTPATIENT)
Age: 86
End: 2021-03-11

## 2021-03-11 LAB
BASOPHILS # BLD AUTO: 0.1 K/UL — SIGNIFICANT CHANGE UP (ref 0–0.2)
BASOPHILS NFR BLD AUTO: 1.2 % — SIGNIFICANT CHANGE UP (ref 0–2)
EOSINOPHIL # BLD AUTO: 0.1 K/UL — SIGNIFICANT CHANGE UP (ref 0–0.5)
EOSINOPHIL NFR BLD AUTO: 1.9 % — SIGNIFICANT CHANGE UP (ref 0–6)
HCT VFR BLD CALC: 27.8 % — LOW (ref 39–50)
HGB BLD-MCNC: 8.1 G/DL — LOW (ref 13–17)
LYMPHOCYTES # BLD AUTO: 0.6 K/UL — LOW (ref 1–3.3)
LYMPHOCYTES # BLD AUTO: 8.8 % — LOW (ref 13–44)
MCHC RBC-ENTMCNC: 28.7 PG — SIGNIFICANT CHANGE UP (ref 27–34)
MCHC RBC-ENTMCNC: 29.3 G/DL — LOW (ref 32–36)
MCV RBC AUTO: 98.1 FL — SIGNIFICANT CHANGE UP (ref 80–100)
MONOCYTES # BLD AUTO: 0.5 K/UL — SIGNIFICANT CHANGE UP (ref 0–0.9)
MONOCYTES NFR BLD AUTO: 7.4 % — SIGNIFICANT CHANGE UP (ref 2–14)
NEUTROPHILS # BLD AUTO: 5.8 K/UL — SIGNIFICANT CHANGE UP (ref 1.8–7.4)
NEUTROPHILS NFR BLD AUTO: 80.6 % — HIGH (ref 43–77)
PLATELET # BLD AUTO: 165 K/UL — SIGNIFICANT CHANGE UP (ref 150–400)
RBC # BLD: 2.83 M/UL — LOW (ref 4.2–5.8)
RBC # FLD: 18.5 % — HIGH (ref 10.3–14.5)
WBC # BLD: 7.2 K/UL — SIGNIFICANT CHANGE UP (ref 3.8–10.5)
WBC # FLD AUTO: 7.2 K/UL — SIGNIFICANT CHANGE UP (ref 3.8–10.5)

## 2021-03-13 ENCOUNTER — INPATIENT (INPATIENT)
Facility: HOSPITAL | Age: 86
LOS: 25 days | Discharge: HOME | DRG: 981 | End: 2021-04-08
Attending: SURGERY | Admitting: STUDENT IN AN ORGANIZED HEALTH CARE EDUCATION/TRAINING PROGRAM
Payer: MEDICARE

## 2021-03-13 VITALS
SYSTOLIC BLOOD PRESSURE: 139 MMHG | WEIGHT: 154.98 LBS | RESPIRATION RATE: 22 BRPM | OXYGEN SATURATION: 99 % | DIASTOLIC BLOOD PRESSURE: 79 MMHG | HEIGHT: 65 IN | TEMPERATURE: 98 F | HEART RATE: 105 BPM

## 2021-03-13 DIAGNOSIS — Z95.2 PRESENCE OF PROSTHETIC HEART VALVE: Chronic | ICD-10-CM

## 2021-03-13 DIAGNOSIS — Z98.62 PERIPHERAL VASCULAR ANGIOPLASTY STATUS: Chronic | ICD-10-CM

## 2021-03-13 DIAGNOSIS — Z98.890 OTHER SPECIFIED POSTPROCEDURAL STATES: Chronic | ICD-10-CM

## 2021-03-13 DIAGNOSIS — F10.239 ALCOHOL DEPENDENCE WITH WITHDRAWAL, UNSPECIFIED: ICD-10-CM

## 2021-03-13 DIAGNOSIS — I77.0 ARTERIOVENOUS FISTULA, ACQUIRED: Chronic | ICD-10-CM

## 2021-03-13 LAB
ALBUMIN SERPL ELPH-MCNC: 3.3 G/DL — SIGNIFICANT CHANGE UP (ref 3.3–5.2)
ALP SERPL-CCNC: 82 U/L — SIGNIFICANT CHANGE UP (ref 40–120)
ALT FLD-CCNC: 10 U/L — SIGNIFICANT CHANGE UP
ANION GAP SERPL CALC-SCNC: 16 MMOL/L — SIGNIFICANT CHANGE UP (ref 5–17)
APTT BLD: 25.4 SEC — LOW (ref 27.5–35.5)
AST SERPL-CCNC: 12 U/L — SIGNIFICANT CHANGE UP
BILIRUB SERPL-MCNC: 0.3 MG/DL — LOW (ref 0.4–2)
BLD GP AB SCN SERPL QL: SIGNIFICANT CHANGE UP
BUN SERPL-MCNC: 106 MG/DL — HIGH (ref 8–20)
CALCIUM SERPL-MCNC: 8.7 MG/DL — SIGNIFICANT CHANGE UP (ref 8.6–10.2)
CHLORIDE SERPL-SCNC: 94 MMOL/L — LOW (ref 98–107)
CO2 SERPL-SCNC: 27 MMOL/L — SIGNIFICANT CHANGE UP (ref 22–29)
CREAT SERPL-MCNC: 4.81 MG/DL — HIGH (ref 0.5–1.3)
GLUCOSE SERPL-MCNC: 240 MG/DL — HIGH (ref 70–99)
HCT VFR BLD CALC: 19 % — CRITICAL LOW (ref 39–50)
HGB BLD-MCNC: 5.6 G/DL — CRITICAL LOW (ref 13–17)
INR BLD: 1.22 RATIO — HIGH (ref 0.88–1.16)
MCHC RBC-ENTMCNC: 29.5 GM/DL — LOW (ref 32–36)
MCHC RBC-ENTMCNC: 30.1 PG — SIGNIFICANT CHANGE UP (ref 27–34)
MCV RBC AUTO: 102.2 FL — HIGH (ref 80–100)
NT-PROBNP SERPL-SCNC: HIGH PG/ML (ref 0–300)
OB PNL STL: POSITIVE
PLATELET # BLD AUTO: 153 K/UL — SIGNIFICANT CHANGE UP (ref 150–400)
POTASSIUM SERPL-MCNC: 4.5 MMOL/L — SIGNIFICANT CHANGE UP (ref 3.5–5.3)
POTASSIUM SERPL-SCNC: 4.5 MMOL/L — SIGNIFICANT CHANGE UP (ref 3.5–5.3)
PROT SERPL-MCNC: 5.6 G/DL — LOW (ref 6.6–8.7)
PROTHROM AB SERPL-ACNC: 14 SEC — HIGH (ref 10.6–13.6)
RBC # BLD: 1.86 M/UL — LOW (ref 4.2–5.8)
RBC # FLD: 19.3 % — HIGH (ref 10.3–14.5)
SARS-COV-2 RNA SPEC QL NAA+PROBE: SIGNIFICANT CHANGE UP
SODIUM SERPL-SCNC: 137 MMOL/L — SIGNIFICANT CHANGE UP (ref 135–145)
TROPONIN T SERPL-MCNC: 0.13 NG/ML — HIGH (ref 0–0.06)
WBC # BLD: 6.6 K/UL — SIGNIFICANT CHANGE UP (ref 3.8–10.5)
WBC # FLD AUTO: 6.6 K/UL — SIGNIFICANT CHANGE UP (ref 3.8–10.5)

## 2021-03-13 PROCEDURE — 71045 X-RAY EXAM CHEST 1 VIEW: CPT | Mod: 26

## 2021-03-13 PROCEDURE — 99233 SBSQ HOSP IP/OBS HIGH 50: CPT

## 2021-03-13 PROCEDURE — 99223 1ST HOSP IP/OBS HIGH 75: CPT

## 2021-03-13 PROCEDURE — 99285 EMERGENCY DEPT VISIT HI MDM: CPT

## 2021-03-13 PROCEDURE — 99222 1ST HOSP IP/OBS MODERATE 55: CPT | Mod: GC

## 2021-03-13 PROCEDURE — 93010 ELECTROCARDIOGRAM REPORT: CPT

## 2021-03-13 RX ORDER — FUROSEMIDE 40 MG
40 TABLET ORAL ONCE
Refills: 0 | Status: COMPLETED | OUTPATIENT
Start: 2021-03-13 | End: 2021-03-13

## 2021-03-13 RX ORDER — DULOXETINE HYDROCHLORIDE 30 MG/1
60 CAPSULE, DELAYED RELEASE ORAL DAILY
Refills: 0 | Status: DISCONTINUED | OUTPATIENT
Start: 2021-03-13 | End: 2021-03-25

## 2021-03-13 RX ORDER — NIFEDIPINE 30 MG
90 TABLET, EXTENDED RELEASE 24 HR ORAL DAILY
Refills: 0 | Status: DISCONTINUED | OUTPATIENT
Start: 2021-03-13 | End: 2021-03-25

## 2021-03-13 RX ORDER — HYDRALAZINE HCL 50 MG
50 TABLET ORAL
Refills: 0 | Status: DISCONTINUED | OUTPATIENT
Start: 2021-03-13 | End: 2021-03-16

## 2021-03-13 RX ORDER — ISOSORBIDE MONONITRATE 60 MG/1
30 TABLET, EXTENDED RELEASE ORAL DAILY
Refills: 0 | Status: DISCONTINUED | OUTPATIENT
Start: 2021-03-13 | End: 2021-03-25

## 2021-03-13 RX ORDER — FUROSEMIDE 40 MG
40 TABLET ORAL ONCE
Refills: 0 | Status: DISCONTINUED | OUTPATIENT
Start: 2021-03-13 | End: 2021-03-13

## 2021-03-13 RX ORDER — ATORVASTATIN CALCIUM 80 MG/1
80 TABLET, FILM COATED ORAL AT BEDTIME
Refills: 0 | Status: DISCONTINUED | OUTPATIENT
Start: 2021-03-13 | End: 2021-03-25

## 2021-03-13 RX ORDER — GABAPENTIN 400 MG/1
100 CAPSULE ORAL
Refills: 0 | Status: DISCONTINUED | OUTPATIENT
Start: 2021-03-13 | End: 2021-03-14

## 2021-03-13 RX ORDER — NIFEDIPINE 30 MG
1 TABLET, EXTENDED RELEASE 24 HR ORAL
Qty: 0 | Refills: 0 | DISCHARGE

## 2021-03-13 RX ORDER — NIFEDIPINE 30 MG
60 TABLET, EXTENDED RELEASE 24 HR ORAL
Refills: 0 | Status: DISCONTINUED | OUTPATIENT
Start: 2021-03-13 | End: 2021-03-14

## 2021-03-13 RX ORDER — PANTOPRAZOLE SODIUM 20 MG/1
40 TABLET, DELAYED RELEASE ORAL EVERY 12 HOURS
Refills: 0 | Status: DISCONTINUED | OUTPATIENT
Start: 2021-03-13 | End: 2021-03-17

## 2021-03-13 RX ADMIN — GABAPENTIN 100 MILLIGRAM(S): 400 CAPSULE ORAL at 21:50

## 2021-03-13 RX ADMIN — PANTOPRAZOLE SODIUM 40 MILLIGRAM(S): 20 TABLET, DELAYED RELEASE ORAL at 21:51

## 2021-03-13 RX ADMIN — Medication 50 MILLIGRAM(S): at 21:50

## 2021-03-13 RX ADMIN — Medication 60 MILLIGRAM(S): at 21:50

## 2021-03-13 RX ADMIN — ATORVASTATIN CALCIUM 80 MILLIGRAM(S): 80 TABLET, FILM COATED ORAL at 21:29

## 2021-03-13 RX ADMIN — Medication 40 MILLIGRAM(S): at 23:04

## 2021-03-13 NOTE — ED ADULT NURSE REASSESSMENT NOTE - NS ED NURSE REASSESS COMMENT FT1
Assumed pt care from previous RN Pt resting comfortably in stretcher, A&Ox3, NAD noted, respirations even and nonlabored, NSR on monitor. Pt offers no complaints at this time. blood transfusion initiated at 2010. signs of reactions explained. family at bedside. vss at this time.

## 2021-03-13 NOTE — ED PROVIDER NOTE - PHYSICAL EXAMINATION
Alert, lucid, and pale in no apparent distress. Pt is normocephalic, atraumatic.  Pupils are equal, round, lips pink, moist mucous membranes, tongue midline. Neck supple.   Lungs clear to auscultation. Heart regular rate and rhythm, normal S1, S2,   Abdomen is soft, nontender, no pulsatile mass, no masses, rectal black stool guiac positive  Non-focal sensory, 5 out of 5 motor strength, Skin without rash,  doppler rt foot dorsalis pedis and left dorsalis pedis and pt pulse;    Normal mentation, does not appear agitated

## 2021-03-13 NOTE — ED ADULT NURSE REASSESSMENT NOTE - NS ED NURSE REASSESS COMMENT FT1
15min blood transfusion check complete. no signs of adverse reactions. vitals remain stable. family aware of plan of care.

## 2021-03-13 NOTE — H&P ADULT - ASSESSMENT
85 yo male, PMHx of HTN, HLD, CAD, HFpEF, s/p Right CEA for Carotid artery disease, ESRD on HD 2d/week (M/Fri) via LUE fistula, s/p flecainide w/ ILR placed 6/2020, AS s/p TAVR, PAD with RLE revasc on 10/2020 on Plavix, anemia requiring prior transfusions, presenting for worsening weakness and dyspnea on exertion, found to have and to be admitted for symptomatic anemia Hb 5.6.     Admit to Medicine. Dr. Neal  Telemetry Bed  Vitals per routine  Ambulate with assist  NPO for now      Symptomatic Anemia, suspect upper GIB, Hb 5.6  in setting of chronic NSAID use, blood loss anemia   2 units prbcs ordered for transfusion  protonix 40 IV BID ordered   GI Consulted for AM  NPO for now   H/H ordered post transfusion     HFpEF  TTE 65% last echo Sept 2020   BNP noted >18K however in setting of ESRD patient currently does not appear to be in exacerbation   given dose of lasix x1 for excess volume given with transfusion   c/w torsemide   c/w imdur, procardia, hydralazine  daily weights  [place] cardiology consult called      CAD  trop elevated in setting of ESRD, no change from prior baseline,   will trend trop to eval for any evidence of demand ischemia  c/w statin  holding plavix for now in setting of acute bleeding    ESRD on HD Mon/Fri  Nephro consult called  nephrovite     PAD s/p RLE revasc  vascular consult appreciated  doppler at bedside detecting b/l lower extremity pulses   discussed with [facility] Surgery team, Resident Wyman, regarding holding Plavix in setting of acute blood loss anemia, agreed to hold for now, to reassess in AM  pending DANNA/PVRs      HTN  currently stable  c/w home medications with parameters  in setting of blood loss, will monitor blood pressure and hold any agents if MAP< 65, SBP<100    HLD  c/w statin    DVT Ppx: SCDs, holding chemical AC in setting of acute bleeding    Dispo: PT Eval 85 yo male, PMHx of HTN, HLD, CAD, HFpEF, s/p Right CEA for Carotid artery disease, ESRD on HD 2d/week (M/Fri) via LUE fistula, s/p flecainide w/ ILR placed 6/2020, AS s/p TAVR, PAD with RLE revasc on 10/2020 on Plavix, anemia requiring prior transfusions, presenting for worsening weakness and dyspnea on exertion, found to have and to be admitted for symptomatic anemia Hb 5.6.     Admit to Medicine. Dr. Neal  Telemetry Bed  Vitals per routine  Ambulate with assist  NPO for now      Symptomatic Anemia, suspect upper GIB, Hb 5.6  in setting of chronic NSAID use, blood loss anemia   2 units prbcs ordered for transfusion  protonix 40 IV BID ordered   GI Consulted for AM  NPO for now   H/H ordered post transfusion     HFpEF  TTE 65% last echo Sept 2020   BNP noted >18K however in setting of ESRD patient currently does not appear to be in exacerbation   given dose of lasix x1 for excess volume given with transfusion   c/w torsemide   c/w imdur, procardia, hydralazine  daily weights  [place] cardiology consult called      CAD  trop elevated in setting of ESRD, no change from prior baseline,   will trend trop to eval for any evidence of demand ischemia  c/w statin  holding plavix for now in setting of acute bleeding    ESRD on HD Mon/Fri  Nephro consult called  nephrovite     PAD s/p RLE revasc  vascular consult appreciated  doppler at bedside detecting b/l lower extremity pulses   discussed with [facility] Surgery team, Resident Wyman, regarding holding Plavix in setting of acute blood loss anemia, agreed to hold for now, to reassess in AM  pending DANNA/PVRs    gabapentin for pain continued    HTN  currently stable  c/w home medications with parameters  in setting of blood loss, will monitor blood pressure and hold any agents if MAP< 65, SBP<100    HLD  c/w statin    DVT Ppx: SCDs, holding chemical AC in setting of acute bleeding    Dispo: PT Eval

## 2021-03-13 NOTE — H&P ADULT - NSHPPHYSICALEXAM_GEN_ALL_CORE
Vital Signs Last 24 Hrs  T(F): 98.3 (13 Mar 2021 23:00), Max: 98.7 (13 Mar 2021 21:51)  HR: 62 (13 Mar 2021 23:00) (62 - 105)  BP: 118/51 (13 Mar 2021 23:00) (118/51 - 139/79)  RR: 18 (13 Mar 2021 23:00) (18 - 22)  SpO2: 100% (13 Mar 2021 23:00) (98% - 100%)    Physical Exam:  Constitutional: elder male, laying in stretcher, pale appearing, NAD  Eyes: EOMI, PERRL, pallor noted  Mouth: dry and pale appearing oral mucosa.   Neck: Soft and supple, No LAD   Respiratory: cta b/l no wheezing no rhonchi  Cardiovascular: +s1/s2 RRR, 2+ pedal edema b/l le  Gastrointestinal: soft nt nd bs+  Vascular: 2+ radial, 1+ dp pulses  Neurological: A/O x 3, CN2-12 intact, no focal deficits  Musculoskeletal: FROM all 4 extremities, b/l lower extremities tender to palpation L>R  Skin: warm, dry, pallor noted   Psych: mood appropriate, normal affect Vital Signs Last 24 Hrs  T(F): 98.3 (13 Mar 2021 23:00), Max: 98.7 (13 Mar 2021 21:51)  HR: 62 (13 Mar 2021 23:00) (62 - 105)  BP: 118/51 (13 Mar 2021 23:00) (118/51 - 139/79)  RR: 18 (13 Mar 2021 23:00) (18 - 22)  SpO2: 100% (13 Mar 2021 23:00) (98% - 100%)    Physical Exam:  Constitutional: elder male, laying in stretcher, pale appearing, NAD  Eyes: EOMI, PERRL, pallor noted  Mouth: dry and pale appearing oral mucosa.   Neck: Soft and supple, No LAD   Respiratory: cta b/l no wheezing no rhonchi  Cardiovascular: +s1/s2 RRR, 1/6 DEANA noted, 2+ pedal edema b/l le  Gastrointestinal: soft nt nd bs+  Vascular: 2+ radial, 1+ dp pulses  Neurological: A/O x 3, CN2-12 intact, no focal deficits  Musculoskeletal: FROM all 4 extremities, b/l lower extremities tender to palpation L>R  Skin: warm, dry, pallor noted   Psych: mood appropriate, normal affect

## 2021-03-13 NOTE — ED ADULT TRIAGE NOTE - CHIEF COMPLAINT QUOTE
pt c/o increased weakness over the past week with dark black stool mal odorous as per daughter. pt reports taking increased motrin due to pain to legs. pt states he had rita and rita vaccine yesterday. pt reports hgb 8.1 on wednesday. pallor in color, pt c/o pain to legs. pt dialysis ROSARIO, KENDRA noted

## 2021-03-13 NOTE — CONSULT NOTE ADULT - SUBJECTIVE AND OBJECTIVE BOX
Vascular Surgery Consult Note    Patient is a 86y old  Male who presents with a chief complaint of fatigue     HPI: 86 year old male with the PMH as listed below presented to [facility] ED due to c/o increasing fatigue, weakness and worsening RLE pain. Patient with known history of anemia for which he follows up with Hematology as outpatient. Additionally, patient has undergone multiple EGD and colonoscopies for GI bleeds, patient with documented gastric ulcers and AVMs of the colon. Patient with PVD for which he underwent an angiogram of the RLE with deployment of 3 popliteal stents with Dr. Barbara Sorenson on Oct 22, 2020. Patient denies any pain of the RLE but states that he has constant pain of the LLE foot at rest.     Patient denies fevers/chills, denies lightheadedness/dizziness, denies SOB/chest pain, denies nausea/vomiting, denies constipation/diarrhea.     ROS: 10-system review is otherwise negative except HPI above.      PAST MEDICAL & SURGICAL HISTORY:  GI bleeding  due to ulcerated polyps and colonic AVMs    Aortic stenosis  - s/p valve replacement    ESRD on dialysis  HD on Mondays and Fridays    Risk factors for obstructive sleep apnea    Anemia    RICHARDS (dyspnea on exertion)    VT (ventricular tachycardia)    HTN (hypertension)    CAD (coronary artery disease)    Arrhythmia    AV block, 1st degree    DM (diabetes mellitus)  - resolved    History of loop recorder    S/P TAVR (transcatheter aortic valve replacement)    H/O carotid endarterectomy  Right    A-V fistula  left arm 5/2017    H/O angioplasty  2013,  no  intervention    H/O left knee surgery    H/O circumcision  at  age  65      FAMILY HISTORY:  FH: type 2 diabetes    Family history of premature CAD    Family history of lung cancer (Grandparent)    Family history of cancer (Grandparent)      [] Family history not pertinent as reviewed with the patient and family      ALLERGIES: Plavix (Hives)  Toprol-XL (Rash)        CURRENT MEDICATIONS  MEDICATIONS (STANDING): atorvastatin 80 milliGRAM(s) Oral at bedtime  DULoxetine 60 milliGRAM(s) Oral daily  furosemide   Injectable 40 milliGRAM(s) IV Push once  gabapentin 100 milliGRAM(s) Oral two times a day  hydrALAZINE 50 milliGRAM(s) Oral two times a day  isosorbide   mononitrate ER Tablet (IMDUR) 30 milliGRAM(s) Oral daily  Nephro-pito 1 Tablet(s) Oral daily  NIFEdipine IR 60 milliGRAM(s) Oral <User Schedule>  NIFEdipine XL 90 milliGRAM(s) Oral daily  pantoprazole  Injectable 40 milliGRAM(s) IV Push every 12 hours  torsemide 20 milliGRAM(s) Oral <User Schedule>    MEDICATIONS (PRN):  --------------------------------------------------------------------------------------------    Vitals:   T(C): 36.8 (03-13-21 @ 23:00), Max: 37.1 (03-13-21 @ 21:51)  HR: 62 (03-13-21 @ 23:00) (62 - 105)  BP: 118/51 (03-13-21 @ 23:00) (118/51 - 139/79)  RR: 18 (03-13-21 @ 23:00) (18 - 22)  SpO2: 100% (03-13-21 @ 23:00) (98% - 100%)  CAPILLARY BLOOD GLUCOSE        CAPILLARY BLOOD GLUCOSE          Height (cm): 165.1 (03-13 @ 17:39)  Weight (kg): 70.3 (03-13 @ 17:39)  BMI (kg/m2): 25.8 (03-13 @ 17:39)  BSA (m2): 1.77 (03-13 @ 17:39)    PHYSICAL EXAM:   General: NAD, Lying in bed comfortably  Neuro: A+Ox3  HEENT: NC/AT, EOMI  Neck: Soft, supple  Cardio: RRR, nml S1/S2  Resp: Good effort, CTA b/l  Thorax: No chest wall tenderness  GI/Abd: Soft, NT/ND, no rebound/guarding, no masses palpated  Vascular: All 4 extremities warm. LUE AVF with palpable thrill. B/L femoral pulses palpable. Non-palpable pop pulses. RLE non-palpable pulses, dopplerable DP signal, LLE palpable and dopplerable DP.    Skin: Intact, no breakdown, no uclerations nor chronic wounds of the LLE nor foot  Lymphatic/Nodes: No palpable lymphadenopathy  Musculoskeletal: All 4 extremities moving spontaneously, no limitations  --------------------------------------------------------------------------------------------    LABS  CBC (03-13 @ 18:31)                              5.6<LL>                         6.60    )----------------(  153        --    % Neutrophils, --    % Lymphocytes, ANC: --                                  19.0<LL>    BMP (03-13 @ 18:31)             137     |  94<L>   |  106.0<H>		Ca++ --      Ca 8.7                ---------------------------------( 240<H>		Mg --                 4.5     |  27.0    |  4.81<H>			Ph --        LFTs (03-13 @ 18:31)      TPro 5.6<L> / Alb 3.3 / TBili 0.3<L> / DBili -- / AST 12 / ALT 10 / AlkPhos 82    Coags (03-13 @ 18:31)  aPTT 25.4<L> / INR 1.22<H> / PT 14.0<H>          --------------------------------------------------------------------------------------------    MICROBIOLOGY      --------------------------------------------------------------------------------------------    IMAGING  ***

## 2021-03-13 NOTE — H&P ADULT - NSHPREVIEWOFSYSTEMS_GEN_ALL_CORE
Denies fevers, chills, headaches, dizziness, blurry vision, chest pain, palpitations, cough, wheezing, abdominal pain, nausea, vomiting, diarrhea, dysuria.   +dyspnea on exertion, +melena, +lower extremity leg pain, +weakness

## 2021-03-13 NOTE — ED PROVIDER NOTE - CARE PLAN
Principal Discharge DX:	Anemia, unspecified type  Secondary Diagnosis:	Gastrointestinal hemorrhage, unspecified gastrointestinal hemorrhage type

## 2021-03-13 NOTE — ED ADULT NURSE NOTE - PSH
A-V fistula  left arm 5/2017  H/O angioplasty  2013,  no  intervention  H/O carotid endarterectomy  Right  H/O circumcision  at  age  65  H/O left knee surgery    History of loop recorder    S/P TAVR (transcatheter aortic valve replacement)

## 2021-03-13 NOTE — ED ADULT NURSE NOTE - OBJECTIVE STATEMENT
Patient arrived to ED today with c/o weakness over the past week, dark stools, foul smelling stools.  Patient has been taking Motrin due to leg pains also.  Patient received [employer]al [employer] Jamal vaccine yesterday.  Patient Hg 8.1 last week.

## 2021-03-13 NOTE — CONSULT NOTE ADULT - ASSESSMENT
ASSESSMENT: Patient is a 86y old m with severe anemia presenting with rest pain of LLE.    PLAN:    - No concern for acute limb ischemia as LLE with palpable and dopplerable DP  - Recommend B/P DANNA/PVRs  - Continue Plavix for right lower extremity stents  - multimodal pain regimen  - Vascular Surgery will continue to follow   - Plan discussed with Attending  ASSESSMENT: Patient is a 86y old m with severe anemia presenting with rest pain of LLE.    PLAN:    - No concern for acute limb ischemia as LLE with palpable and dopplerable DP  - Recommend B/P DANNA/PVRs  - Hold Plavix due to severe anemia  - multimodal pain regimen  - Vascular Surgery will continue to follow   - Plan discussed with Attending

## 2021-03-13 NOTE — ED ADULT NURSE NOTE - CAS EDN DISCHARGE INTERVENTIONS
I have personally performed a face to face diagnostic evaluation on this patient. I have reviewed the ACP note and agree with the history, exam and plan of care, except as noted.
Arm band on/IV intact/Admission wristband placed

## 2021-03-13 NOTE — H&P ADULT - HISTORY OF PRESENT ILLNESS
85 y/o male with PMHx of HTN, HLD, CAD, HFpEF, s/p Right CEA for Carotid artery disease, ESRD on HD 2d/week (M/Fri) via LUE fistula, s/p flecainide w/ ILR placed 6/2020, AS s/p TAVR, PAD with RLE revasc on 10/2020, on Eliquis and Plavix, anemia requiring prior transfusions, following at Dr Varghese office for aricept, who presents to ED for worsening weakness for last week in setting of noted darkened stools. Daughter who is RN Manager at [employer] present at bedside to also provide collateral history. Patient has been having worsening left leg pain, extremity that was not stented as per patient, for which he has been taking 600mg of ibuprofen daily for many weeks now for pain control. Has also been on plavix for lower extremity stent. Per patient, earlier in the day was in kitchen using walker where he suddenly felt weak and "almost collapsed" but was helped by wife to get seated. Endorses having dark stools over last few days. Has been having worsening dyspnea on exertion but denies any chest pain. Of note, daughter at bedside does say that he has been looking much more weaker now from baseline. Recently was seen by Dr. Baker in last week, was told his Hb was around 8 and was told it was stable.     In ED: noted occult positive, hb 5.6, 2 units prbcs ordered for transfusion, EKG noted: V Paced, rate 64, no evidence of acute ischemic changes. Admission called.  85 y/o male with PMHx of HTN, HLD, CAD, HFpEF, s/p Right CEA for Carotid artery disease, ESRD on HD 2d/week (M/Fri) via LUE fistula, s/p flecainide w/ ILR placed 6/2020, AS s/p TAVR, PAD with RLE revasc on 10/2020 on Plavix, anemia requiring prior transfusions, following at Dr Varghese office for aricept, who presents to ED for worsening weakness for last week in setting of noted darkened stools. Daughter who is RN Manager at [employer] present at bedside to also provide collateral history. Patient has been having worsening left leg pain, extremity that was not stented as per patient, for which he has been taking 600mg of ibuprofen daily for many weeks now for pain control. Has also been on plavix for lower extremity stent. Per patient, earlier in the day was in kitchen using walker where he suddenly felt weak and "almost collapsed" but was helped by wife to get seated. Denies losing consciousness, syncopating or falling to floor. Endorses having dark stools over last few days. Has been having worsening dyspnea on exertion but denies any chest pain. Of note, daughter at bedside does say that he has been looking much more weaker now from baseline. Recently was seen by Dr. Baker in last week, was told his Hb was around 8 and was told it was stable.     In ED: noted occult positive, hb 5.6, 2 units prbcs ordered for transfusion, EKG noted: V Paced, rate 64, no evidence of acute ischemic changes. Admission called.

## 2021-03-13 NOTE — ED PROVIDER NOTE - OBJECTIVE STATEMENT
85 yo male with multiple medical problems , cad, dm recently on motrin for left leg pain comes to ed for increased weakness  today; as per daughter, pt with dark stool;  denies fever, chills or sick contacts

## 2021-03-13 NOTE — ED ADULT NURSE NOTE - NSIMPLEMENTINTERV_GEN_ALL_ED
Implemented All Fall with Harm Risk Interventions:  [employer] to call system. Call bell, personal items and telephone within reach. Instruct patient to call for assistance. Room bathroom lighting operational. Non-slip footwear when patient is off stretcher. Physically safe environment: no spills, clutter or unnecessary equipment. Stretcher in lowest position, wheels locked, appropriate side rails in place. Provide visual cue, wrist band, yellow gown, etc. Monitor gait and stability. Monitor for mental status changes and reorient to person, place, and time. Review medications for side effects contributing to fall risk. Reinforce activity limits and safety measures with patient and family. Provide visual clues: red socks.

## 2021-03-14 ENCOUNTER — TRANSCRIPTION ENCOUNTER (OUTPATIENT)
Age: 86
End: 2021-03-14

## 2021-03-14 DIAGNOSIS — K92.2 GASTROINTESTINAL HEMORRHAGE, UNSPECIFIED: ICD-10-CM

## 2021-03-14 LAB
ANION GAP SERPL CALC-SCNC: 23 MMOL/L — HIGH (ref 5–17)
BASOPHILS # BLD AUTO: 0.08 K/UL — SIGNIFICANT CHANGE UP (ref 0–0.2)
BASOPHILS NFR BLD AUTO: 1.4 % — SIGNIFICANT CHANGE UP (ref 0–2)
BUN SERPL-MCNC: 112 MG/DL — HIGH (ref 8–20)
CALCIUM SERPL-MCNC: 8.7 MG/DL — SIGNIFICANT CHANGE UP (ref 8.6–10.2)
CHLORIDE SERPL-SCNC: 95 MMOL/L — LOW (ref 98–107)
CO2 SERPL-SCNC: 23 MMOL/L — SIGNIFICANT CHANGE UP (ref 22–29)
CREAT SERPL-MCNC: 5.25 MG/DL — HIGH (ref 0.5–1.3)
EOSINOPHIL # BLD AUTO: 0.09 K/UL — SIGNIFICANT CHANGE UP (ref 0–0.5)
EOSINOPHIL NFR BLD AUTO: 1.5 % — SIGNIFICANT CHANGE UP (ref 0–6)
GLUCOSE SERPL-MCNC: 130 MG/DL — HIGH (ref 70–99)
HCT VFR BLD CALC: 22.5 % — LOW (ref 39–50)
HCT VFR BLD CALC: 27.4 % — LOW (ref 39–50)
HGB BLD-MCNC: 7.1 G/DL — LOW (ref 13–17)
HGB BLD-MCNC: 9 G/DL — LOW (ref 13–17)
IMM GRANULOCYTES NFR BLD AUTO: 0.5 % — SIGNIFICANT CHANGE UP (ref 0–1.5)
LYMPHOCYTES # BLD AUTO: 0.52 K/UL — LOW (ref 1–3.3)
LYMPHOCYTES # BLD AUTO: 8.9 % — LOW (ref 13–44)
MAGNESIUM SERPL-MCNC: 2.3 MG/DL — SIGNIFICANT CHANGE UP (ref 1.6–2.6)
MCHC RBC-ENTMCNC: 30.2 PG — SIGNIFICANT CHANGE UP (ref 27–34)
MCHC RBC-ENTMCNC: 31.6 GM/DL — LOW (ref 32–36)
MCV RBC AUTO: 95.7 FL — SIGNIFICANT CHANGE UP (ref 80–100)
MONOCYTES # BLD AUTO: 0.58 K/UL — SIGNIFICANT CHANGE UP (ref 0–0.9)
MONOCYTES NFR BLD AUTO: 9.9 % — SIGNIFICANT CHANGE UP (ref 2–14)
NEUTROPHILS # BLD AUTO: 4.57 K/UL — SIGNIFICANT CHANGE UP (ref 1.8–7.4)
NEUTROPHILS NFR BLD AUTO: 77.8 % — HIGH (ref 43–77)
PHOSPHATE SERPL-MCNC: 6.6 MG/DL — HIGH (ref 2.4–4.7)
PLATELET # BLD AUTO: 124 K/UL — LOW (ref 150–400)
POTASSIUM SERPL-MCNC: 4.1 MMOL/L — SIGNIFICANT CHANGE UP (ref 3.5–5.3)
POTASSIUM SERPL-SCNC: 4.1 MMOL/L — SIGNIFICANT CHANGE UP (ref 3.5–5.3)
RBC # BLD: 2.35 M/UL — LOW (ref 4.2–5.8)
RBC # FLD: 19 % — HIGH (ref 10.3–14.5)
SARS-COV-2 IGG SERPL QL IA: NEGATIVE — SIGNIFICANT CHANGE UP
SARS-COV-2 IGM SERPL IA-ACNC: 0.28 INDEX — SIGNIFICANT CHANGE UP
SODIUM SERPL-SCNC: 141 MMOL/L — SIGNIFICANT CHANGE UP (ref 135–145)
TROPONIN T SERPL-MCNC: 0.13 NG/ML — HIGH (ref 0–0.06)
WBC # BLD: 5.87 K/UL — SIGNIFICANT CHANGE UP (ref 3.8–10.5)
WBC # FLD AUTO: 5.87 K/UL — SIGNIFICANT CHANGE UP (ref 3.8–10.5)

## 2021-03-14 PROCEDURE — 99223 1ST HOSP IP/OBS HIGH 75: CPT | Mod: 25

## 2021-03-14 PROCEDURE — 93306 TTE W/DOPPLER COMPLETE: CPT | Mod: 26

## 2021-03-14 PROCEDURE — 93925 LOWER EXTREMITY STUDY: CPT | Mod: 26

## 2021-03-14 PROCEDURE — 93923 UPR/LXTR ART STDY 3+ LVLS: CPT | Mod: 26

## 2021-03-14 PROCEDURE — 99233 SBSQ HOSP IP/OBS HIGH 50: CPT

## 2021-03-14 PROCEDURE — 90937 HEMODIALYSIS REPEATED EVAL: CPT

## 2021-03-14 PROCEDURE — 99223 1ST HOSP IP/OBS HIGH 75: CPT

## 2021-03-14 RX ORDER — NIFEDIPINE 30 MG
3 TABLET, EXTENDED RELEASE 24 HR ORAL
Qty: 0 | Refills: 0 | DISCHARGE

## 2021-03-14 RX ORDER — GABAPENTIN 400 MG/1
100 CAPSULE ORAL THREE TIMES A DAY
Refills: 0 | Status: DISCONTINUED | OUTPATIENT
Start: 2021-03-14 | End: 2021-03-14

## 2021-03-14 RX ORDER — ACETAMINOPHEN 500 MG
650 TABLET ORAL EVERY 6 HOURS
Refills: 0 | Status: DISCONTINUED | OUTPATIENT
Start: 2021-03-14 | End: 2021-03-18

## 2021-03-14 RX ORDER — GABAPENTIN 400 MG/1
100 CAPSULE ORAL
Refills: 0 | Status: DISCONTINUED | OUTPATIENT
Start: 2021-03-14 | End: 2021-03-22

## 2021-03-14 RX ORDER — ERYTHROPOIETIN 10000 [IU]/ML
12000 INJECTION, SOLUTION INTRAVENOUS; SUBCUTANEOUS
Refills: 0 | Status: DISCONTINUED | OUTPATIENT
Start: 2021-03-14 | End: 2021-03-23

## 2021-03-14 RX ADMIN — Medication 650 MILLIGRAM(S): at 18:30

## 2021-03-14 RX ADMIN — PANTOPRAZOLE SODIUM 40 MILLIGRAM(S): 20 TABLET, DELAYED RELEASE ORAL at 21:31

## 2021-03-14 RX ADMIN — GABAPENTIN 100 MILLIGRAM(S): 400 CAPSULE ORAL at 21:31

## 2021-03-14 RX ADMIN — Medication 650 MILLIGRAM(S): at 23:52

## 2021-03-14 RX ADMIN — Medication 50 MILLIGRAM(S): at 05:40

## 2021-03-14 RX ADMIN — ATORVASTATIN CALCIUM 80 MILLIGRAM(S): 80 TABLET, FILM COATED ORAL at 20:13

## 2021-03-14 RX ADMIN — GABAPENTIN 100 MILLIGRAM(S): 400 CAPSULE ORAL at 05:40

## 2021-03-14 RX ADMIN — Medication 50 MILLIGRAM(S): at 20:13

## 2021-03-14 RX ADMIN — Medication 650 MILLIGRAM(S): at 07:29

## 2021-03-14 RX ADMIN — ERYTHROPOIETIN 12000 UNIT(S): 10000 INJECTION, SOLUTION INTRAVENOUS; SUBCUTANEOUS at 17:20

## 2021-03-14 RX ADMIN — Medication 650 MILLIGRAM(S): at 05:40

## 2021-03-14 RX ADMIN — ISOSORBIDE MONONITRATE 30 MILLIGRAM(S): 60 TABLET, EXTENDED RELEASE ORAL at 12:36

## 2021-03-14 RX ADMIN — Medication 20 MILLIGRAM(S): at 12:36

## 2021-03-14 RX ADMIN — Medication 90 MILLIGRAM(S): at 05:40

## 2021-03-14 RX ADMIN — DULOXETINE HYDROCHLORIDE 60 MILLIGRAM(S): 30 CAPSULE, DELAYED RELEASE ORAL at 12:36

## 2021-03-14 RX ADMIN — PANTOPRAZOLE SODIUM 40 MILLIGRAM(S): 20 TABLET, DELAYED RELEASE ORAL at 12:36

## 2021-03-14 RX ADMIN — Medication 1 TABLET(S): at 12:36

## 2021-03-14 RX ADMIN — Medication 650 MILLIGRAM(S): at 17:21

## 2021-03-14 NOTE — PROGRESS NOTE ADULT - SUBJECTIVE AND OBJECTIVE BOX
[facility] DIVISION OF KIDNEY DISEASES AND HYPERTENSION -- HEMODIALYSIS NOTE  --------------------------------------------------------------------------------  Chief Complaint: ESRD/Ongoing hemodialysis requirement    24 hour events/subjective:        PAST HISTORY  --------------------------------------------------------------------------------  No significant changes to PMH, PSH, FHx, SHx, unless otherwise noted    ALLERGIES & MEDICATIONS  --------------------------------------------------------------------------------  Allergies    Plavix (Hives)  Toprol-XL (Rash)    Intolerances      Standing Inpatient Medications  atorvastatin 80 milliGRAM(s) Oral at bedtime  DULoxetine 60 milliGRAM(s) Oral daily  epoetin juan-epbx (RETACRIT) Injectable 29011 Unit(s) IV Push <User Schedule>  gabapentin 100 milliGRAM(s) Oral two times a day  hydrALAZINE 50 milliGRAM(s) Oral two times a day  isosorbide   mononitrate ER Tablet (IMDUR) 30 milliGRAM(s) Oral daily  Nephro-pito 1 Tablet(s) Oral daily  NIFEdipine XL 90 milliGRAM(s) Oral daily  pantoprazole  Injectable 40 milliGRAM(s) IV Push every 12 hours  torsemide 20 milliGRAM(s) Oral <User Schedule>    PRN Inpatient Medications  acetaminophen   Tablet .. 650 milliGRAM(s) Oral every 6 hours PRN      REVIEW OF SYSTEMS  --------------------------------------------------------------------------------  Gen: No weight changes, fatigue, fevers/chills, weakness  Skin: No rashes  Head/Eyes/Ears/Mouth: No headache; Normal hearing; Normal vision w/o blurriness; No sinus pain/discomfort, sore throat  Respiratory: No dyspnea, cough, wheezing, hemoptysis  CV: No chest pain, PND, orthopnea  GI: No abdominal pain, diarrhea, constipation, nausea, vomiting, melena, hematochezia  : No increased frequency, dysuria, hematuria, nocturia  MSK: No joint pain/swelling; no back pain; no edema  Neuro: No dizziness/lightheadedness, weakness, seizures, numbness, tingling  Heme: No easy bruising or bleeding  Endo: No heat/cold intolerance  Psych: No significant nervousness, anxiety, stress, depression    All other systems were reviewed and are negative, except as noted.    VITALS/PHYSICAL EXAM  --------------------------------------------------------------------------------  T(C): 37.1 (03-15-21 @ 05:56), Max: 37.1 (03-15-21 @ 05:56)  HR: 74 (03-15-21 @ 05:56) (60 - 74)  BP: 148/57 (03-15-21 @ 05:56) (127/61 - 162/53)  RR: 18 (03-15-21 @ 05:56) (18 - 18)  SpO2: 95% (03-15-21 @ 05:56) (93% - 99%)  Wt(kg): --  Height (cm): 165.1 (03-13-21 @ 17:39)  Weight (kg): 70.3 (03-13-21 @ 17:39)  BMI (kg/m2): 25.8 (03-13-21 @ 17:39)  BSA (m2): 1.77 (03-13-21 @ 17:39)      03-13-21 @ 06:01  -  03-14-21 @ 07:00  --------------------------------------------------------  IN: 597 mL / OUT: 150 mL / NET: 447 mL    03-14-21 @ 07:01  -  03-15-21 @ 06:06  --------------------------------------------------------  IN: 480 mL / OUT: 1000 mL / NET: -520 mL      Physical Exam:  	Gen: NAD, well-appearing  	HEENT: PERRL, supple neck, clear oropharynx  	Pulm: CTA B/L  	CV: RRR, S1S2; no rub  	Back: No spinal or CVA tenderness; no sacral edema  	Abd: +BS, soft, nontender/nondistended  	: No suprapubic tenderness  	UE: Warm, FROM, no clubbing, intact strength; no edema; no asterixis  	LE: Warm, FROM, no clubbing, intact strength; no edema  	Neuro: No focal deficits, intact gait  	Psych: Normal affect and mood  	Skin: Warm, without rashes  	Vascular access:    LABS/STUDIES  --------------------------------------------------------------------------------              9.3    5.63  >-----------<  123      [03-15-21 @ 05:48]              28.9     141  |  95  |  112.0  ----------------------------<  130      [03-14-21 @ 09:43]  4.1   |  23.0  |  5.25        Ca     8.7     [03-14-21 @ 09:43]      Mg     2.3     [03-14-21 @ 09:43]      Phos  6.6     [03-14-21 @ 09:43]    TPro  5.6  /  Alb  3.3  /  TBili  0.3  /  DBili  x   /  AST  12  /  ALT  10  /  AlkPhos  82  [03-13-21 @ 18:31]    PT/INR: PT 14.0 , INR 1.22       [03-13-21 @ 18:31]  PTT: 25.4       [03-13-21 @ 18:31]    Troponin 0.13      [03-14-21 @ 09:43]    Iron 53, TIBC 266, %sat 20      [08-19-20 @ 06:32]  Ferritin 184      [08-19-20 @ 06:32]  PTH -- (Ca 9.6)      [08-19-20 @ 16:08]   91  Vitamin D (25OH) 26.4      [08-19-20 @ 16:08]  HbA1c 4.9      [08-09-18 @ 05:54]  TSH 2.16      [09-15-20 @ 02:01]

## 2021-03-14 NOTE — PROGRESS NOTE ADULT - SUBJECTIVE AND OBJECTIVE BOX
Alcohol dependence with withdrawal    HPI:  85 y/o male with PMHx of HTN, HLD, CAD, HFpEF, s/p Right CEA for Carotid artery disease, ESRD on HD 2d/week (M/Fri) via LUE fistula, s/p flecainide w/ ILR placed 6/2020, AS s/p TAVR, PAD with RLE revasc on 10/2020 on Plavix, anemia requiring prior transfusions, following at Dr Varghese office for aricept, who presents to ED for worsening weakness for last week in setting of noted darkened stools. Daughter who is RN Manager at [employer] present at bedside to also provide collateral history. Patient has been having worsening left leg pain, extremity that was not stented as per patient, for which he has been taking 600mg of ibuprofen daily for many weeks now for pain control. Has also been on plavix for lower extremity stent. Per patient, earlier in the day was in kitchen using walker where he suddenly felt weak and "almost collapsed" but was helped by wife to get seated. Denies losing consciousness, syncopating or falling to floor. Endorses having dark stools over last few days. Has been having worsening dyspnea on exertion but denies any chest pain. Of note, daughter at bedside does say that he has been looking much more weaker now from baseline. Recently was seen by Dr. Baker in last week, was told his Hb was around 8 and was told it was stable.     In ED: noted occult positive, hb 5.6, 2 units prbcs ordered for transfusion, EKG noted: V Paced, rate 64, no evidence of acute ischemic changes. Admission called.  (13 Mar 2021 21:16)    Interval History:  Patient was seen and examined at bedside. Feeling tired. Complaining of intermittent left leg pain. Denies paresthesia.   Denies chest pain, palpitations, shortness of breath, headache, dizziness, visual symptoms, nausea, vomiting or abdominal pain.    ROS:  As per interval history otherwise unremarkable.    PHYSICAL EXAM:  Vital Signs   T(C): 36.6 (14 Mar 2021 06:56), Max: 37.2 (14 Mar 2021 03:50)  T(F): 97.9 (14 Mar 2021 06:56), Max: 99 (14 Mar 2021 03:50)  HR: 63 (14 Mar 2021 06:56) (60 - 105)  BP: 141/45 (14 Mar 2021 06:56) (118/51 - 146/57)  BP(mean): 89 (13 Mar 2021 20:25) (87 - 89)  RR: 18 (14 Mar 2021 06:56) (18 - 22)  SpO2: 99% (14 Mar 2021 06:56) (91% - 100%)  General: Elderly male sitting in chair comfortably. No acute distress  HEENT: EOMI. Clear conjunctivae. Moist mucus membrane  Neck: Supple.   Chest: CTA bilaterally. No wheezing, rales or rhonchi.   Heart: Normal S1 & S2. RRR.   Abdomen: Soft. Non-tender. Non-distended. + BS  Ext: 2+ pedal edema. No calf tenderness   Neuro: Active and alert. No focal deficit. No speech disorder  Skin: Warm and Dry  Psychiatry: Normal mood and affect    I&O's Summary    13 Mar 2021 06:01  -  14 Mar 2021 07:00  --------------------------------------------------------  IN: 597 mL / OUT: 150 mL / NET: 447 mL    MEDICATIONS  (STANDING):  atorvastatin 80 milliGRAM(s) Oral at bedtime  DULoxetine 60 milliGRAM(s) Oral daily  gabapentin 100 milliGRAM(s) Oral three times a day  hydrALAZINE 50 milliGRAM(s) Oral two times a day  isosorbide   mononitrate ER Tablet (IMDUR) 30 milliGRAM(s) Oral daily  Nephro-pito 1 Tablet(s) Oral daily  NIFEdipine IR 60 milliGRAM(s) Oral <User Schedule>  NIFEdipine XL 90 milliGRAM(s) Oral daily  pantoprazole  Injectable 40 milliGRAM(s) IV Push every 12 hours  torsemide 20 milliGRAM(s) Oral <User Schedule>    MEDICATIONS  (PRN):  acetaminophen   Tablet .. 650 milliGRAM(s) Oral every 6 hours PRN Temp greater or equal to 38C (100.4F), Mild Pain (1 - 3)    LABS:                        5.6    6.60  )-----------( 153      ( 13 Mar 2021 18:31 )             19.0     03-13    137  |  94<L>  |  106.0<H>  ----------------------------<  240<H>  4.5   |  27.0  |  4.81<H>    Ca    8.7      13 Mar 2021 18:31    TPro  5.6<L>  /  Alb  3.3  /  TBili  0.3<L>  /  DBili  x   /  AST  12  /  ALT  10  /  AlkPhos  82  03-13    PT/INR - ( 13 Mar 2021 18:31 )   PT: 14.0 sec;   INR: 1.22 ratio       PTT - ( 13 Mar 2021 18:31 )  PTT:25.4 sec    RADIOLOGY & ADDITIONAL STUDIES:  Reviewed

## 2021-03-14 NOTE — CONSULT NOTE ADULT - SUBJECTIVE AND OBJECTIVE BOX
[place] [facility] - [facility], THE HEART CENTER                                   [address] [place] [address]                                                      PHONE: [phone]                                                         FAX: [phone]  [email or web address]  ---------------------------------------------------------------------------------------------------------------------------------    Reason for Consult: RICHARDS, weakness    HPI:  CHRISTIANO ELIZABETH is an 86y Male h/o CAD, HTN, HLD, HFpEF, s/p R CEA, s/p ILR 6/2020, s/p MICRA PPM, ESRD, AS s/p TAVR, PAD, s/p MYRON revascularization 10/2020 on Plavix, known prior anemia requiring transfusions presents c/o weakness and RICHARDS x days. Patient denies any dizziness or syncope. Per chart, patient has had dark stool. Significant anemia noted in ER.    PAST MEDICAL & SURGICAL HISTORY:  GI bleeding  due to ulcerated polyps and colonic AVMs  Aortic stenosis  - s/p valve replacement  ESRD on dialysis  HD on Mondays and Fridays  Risk factors for obstructive sleep apnea  Anemia  RICHARDS (dyspnea on exertion)  VT (ventricular tachycardia)  HTN (hypertension)  CAD (coronary artery disease)  Arrhythmia  AV block, 1st degree  DM (diabetes mellitus)  - resolved  History of loop recorder  S/P TAVR (transcatheter aortic valve replacement)  H/O carotid endarterectomy  Right  A-V fistula  left arm 5/2017  H/O angioplasty  2013,  no  intervention  H/O left knee surgery  H/O circumcision  at  age  65        Plavix (Hives)  Toprol-XL (Rash)      MEDICATIONS  (STANDING):  atorvastatin 80 milliGRAM(s) Oral at bedtime  DULoxetine 60 milliGRAM(s) Oral daily  gabapentin 100 milliGRAM(s) Oral two times a day  hydrALAZINE 50 milliGRAM(s) Oral two times a day  isosorbide   mononitrate ER Tablet (IMDUR) 30 milliGRAM(s) Oral daily  Nephro-pito 1 Tablet(s) Oral daily  NIFEdipine IR 60 milliGRAM(s) Oral <User Schedule>  NIFEdipine XL 90 milliGRAM(s) Oral daily  pantoprazole  Injectable 40 milliGRAM(s) IV Push every 12 hours  torsemide 20 milliGRAM(s) Oral <User Schedule>    MEDICATIONS  (PRN):  acetaminophen   Tablet .. 650 milliGRAM(s) Oral every 6 hours PRN Temp greater or equal to 38C (100.4F), Mild Pain (1 - 3)      Social History:  Cigarettes:       no             Alchohol:        no         Illicit Drug Abuse:  no    ROS: Negative other than as mentioned in HPI.    Vital Signs Last 24 Hrs  T(C): 36.6 (14 Mar 2021 06:56), Max: 37.2 (14 Mar 2021 03:50)  T(F): 97.9 (14 Mar 2021 06:56), Max: 99 (14 Mar 2021 03:50)  HR: 63 (14 Mar 2021 06:56) (60 - 105)  BP: 141/45 (14 Mar 2021 06:56) (118/51 - 146/57)  BP(mean): 89 (13 Mar 2021 20:25) (87 - 89)  RR: 18 (14 Mar 2021 06:56) (18 - 22)  SpO2: 99% (14 Mar 2021 06:56) (91% - 100%)  ICU Vital Signs Last 24 Hrs  CHRISTIANO ELIZABETH  I&O's Detail    13 Mar 2021 06:01  -  14 Mar 2021 07:00  --------------------------------------------------------  IN:    PRBCs (Packed Red Blood Cells): 597 mL  Total IN: 597 mL    OUT:    Voided (mL): 150 mL  Total OUT: 150 mL    Total NET: 447 mL        I&O's Summary    13 Mar 2021 06:01  -  14 Mar 2021 07:00  --------------------------------------------------------  IN: 597 mL / OUT: 150 mL / NET: 447 mL      Drug Dosing Weight  CHRISTIANO ELIZABETH      PHYSICAL EXAM:  General: NAD.  HEENT: Head; normocephalic.  Eyes: Pupils reactive.  Neck: Supple.  CARDIOVASCULAR: RRR.   LUNGS: Normal breath sounds bilaterally.  ABDOMEN: Soft.  EXTREMITIES: No clubbing, cyanosis. +edema LEs.  SKIN: warm.  NEURO: Alert/awake.    PSYCH: normal affect.        LABS:                        5.6    6.60  )-----------( 153      ( 13 Mar 2021 18:31 )             19.0     03-13    137  |  94<L>  |  106.0<H>  ----------------------------<  240<H>  4.5   |  27.0  |  4.81<H>    Ca    8.7      13 Mar 2021 18:31    TPro  5.6<L>  /  Alb  3.3  /  TBili  0.3<L>  /  DBili  x   /  AST  12  /  ALT  10  /  AlkPhos  82  03-13    CHRISTIANO ELIZABETH  CARDIAC MARKERS ( 13 Mar 2021 18:31 )  x     / 0.13 ng/mL / x     / x     / x          PT/INR - ( 13 Mar 2021 18:31 )   PT: 14.0 sec;   INR: 1.22 ratio         PTT - ( 13 Mar 2021 18:31 )  PTT:25.4 sec      RADIOLOGY & ADDITIONAL STUDIES:    INTERPRETATION OF TELEMETRY (personally reviewed): V paced    ECG: V paced    ECHO:   1. Patient tachycardic during the entire study.   2. Mildly enlarged left atrium.   3. There is mild concentric left ventricular hypertrophy.   4. Hyperdyanmic wall motion. Left ventricular ejection fraction, by visual estimation, is 60 to 65%.   5. The right atrium is normal in size.   6. Normal right ventricular size and function.   7. Bioprosthetic aortic valve visualized. Likely Padilla JENN. well seated valve. Acceptable gradients. No regurgitation.   8. There is no evidence of pericardial effusion.    MD Rohith, RPVI Electronically signed on 9/18/2020 at 10:11:03 AM      Assessment and Plan:  86y Male h/o CAD, HTN, HLD, HFpEF, s/p R CEA, s/p ILR 6/2020, s/p MICRA PPM, ESRD, AS s/p TAVR, PAD, s/p MYRON revascularization 10/2020 on Plavix, known prior anemia requiring transfusions presents c/o weakness and RICHARDS x days. Patient denies any dizziness or syncope. Per chart, patient has had dark stool. Significant anemia noted in ER.    1. Received 2 units pRBCs overnight.  2. GI consult pending.  3. Recommend restarting Plavix if no evidence of active bleeding given h/o recent LE stent 10/2020.  4. Obtain 2D echo.  5. Will interrogate ILR and MICRA PPM.  6. Continue medical management.

## 2021-03-14 NOTE — CONSULT NOTE ADULT - ASSESSMENT
HD this PM,    To Transfuse 2 Units PC,    D/W , Daughter @ length,    Likely AVM,     Pain LLE - US , Perfusion Reviewed,    To d/w JEMMAS

## 2021-03-14 NOTE — PROGRESS NOTE ADULT - SUBJECTIVE AND OBJECTIVE BOX
INTERVAL HPI/OVERNIGHT EVENTS:    Patient transfused two units of pRBC, troponins mildly elevated. Patient denies SOB and chest pain at this moment. Pain is improving with current regimen.        MEDICATIONS  (STANDING):  atorvastatin 80 milliGRAM(s) Oral at bedtime  DULoxetine 60 milliGRAM(s) Oral daily  gabapentin 100 milliGRAM(s) Oral two times a day  hydrALAZINE 50 milliGRAM(s) Oral two times a day  isosorbide   mononitrate ER Tablet (IMDUR) 30 milliGRAM(s) Oral daily  Nephro-pito 1 Tablet(s) Oral daily  NIFEdipine IR 60 milliGRAM(s) Oral <User Schedule>  NIFEdipine XL 90 milliGRAM(s) Oral daily  pantoprazole  Injectable 40 milliGRAM(s) IV Push every 12 hours  torsemide 20 milliGRAM(s) Oral <User Schedule>    MEDICATIONS  (PRN):  acetaminophen   Tablet .. 650 milliGRAM(s) Oral every 6 hours PRN Temp greater or equal to 38C (100.4F), Mild Pain (1 - 3)      Vital Signs Last 24 Hrs  T(C): 36.4 (14 Mar 2021 03:24), Max: 37.1 (13 Mar 2021 21:51)  T(F): 97.5 (14 Mar 2021 03:24), Max: 98.7 (13 Mar 2021 21:51)  HR: 61 (14 Mar 2021 03:24) (61 - 105)  BP: 146/57 (14 Mar 2021 03:24) (118/51 - 146/57)  BP(mean): 89 (13 Mar 2021 20:25) (87 - 89)  RR: 18 (14 Mar 2021 03:24) (18 - 22)  SpO2: 95% (14 Mar 2021 03:24) (95% - 100%)    PE  General: NAD, Lying in bed comfortably  Neuro: A+Ox3  HEENT: NC/AT, EOMI  Neck: Soft, supple  Cardio: RRR, nml S1/S2  Resp: Good effort, CTA b/l  Thorax: No chest wall tenderness  GI/Abd: Soft, NT/ND, no rebound/guarding, no masses palpated  Vascular: All 4 extremities warm. LUE AVF with palpable thrill. B/L femoral pulses palpable. Non-palpable pop pulses. RLE non-palpable pulses, dopplerable DP signal, LLE palpable and dopplerable DP.    Skin: Intact, no breakdown, no uclerations nor chronic wounds of the LLE nor foot  Lymphatic/Nodes: No palpable lymphadenopathy  Musculoskeletal: All 4 extremities moving spontaneously, no limitations      I&O's Detail    13 Mar 2021 06:01  -  14 Mar 2021 03:33  --------------------------------------------------------  IN:    PRBCs (Packed Red Blood Cells): 270 mL  Total IN: 270 mL    OUT:  Total OUT: 0 mL    Total NET: 270 mL          LABS:                        5.6    6.60  )-----------( 153      ( 13 Mar 2021 18:31 )             19.0     03-13    137  |  94<L>  |  106.0<H>  ----------------------------<  240<H>  4.5   |  27.0  |  4.81<H>    Ca    8.7      13 Mar 2021 18:31    TPro  5.6<L>  /  Alb  3.3  /  TBili  0.3<L>  /  DBili  x   /  AST  12  /  ALT  10  /  AlkPhos  82  03-13    PT/INR - ( 13 Mar 2021 18:31 )   PT: 14.0 sec;   INR: 1.22 ratio         PTT - ( 13 Mar 2021 18:31 )  PTT:25.4 sec      RADIOLOGY & ADDITIONAL STUDIES:

## 2021-03-14 NOTE — PROGRESS NOTE ADULT - ASSESSMENT
Completed 3  hrs of dialysis ,1 unit of PRBC given, net fluid loss 1000 cc.        Vital signs stable post dialysis.     Endorsed  to JOSE Zamarripa.

## 2021-03-14 NOTE — CONSULT NOTE ADULT - SUBJECTIVE AND OBJECTIVE BOX
[facility] DIVISION OF KIDNEY DISEASES AND HYPERTENSION -- INITIAL CONSULT NOTE  --------------------------------------------------------------------------------  HPI: GI Bleed,     ESRD - DM N, PAD,     PAST HISTORY  --------------------------------------------------------------------------------  PAST MEDICAL & SURGICAL HISTORY:  GI bleeding  due to ulcerated polyps and colonic AVMs    Aortic stenosis  - s/p valve replacement    ESRD on dialysis  HD on Mondays and Fridays    Risk factors for obstructive sleep apnea    Anemia    RICHARDS (dyspnea on exertion)    VT (ventricular tachycardia)    HTN (hypertension)    CAD (coronary artery disease)    Arrhythmia    AV block, 1st degree    DM (diabetes mellitus)  - resolved    History of loop recorder    S/P TAVR (transcatheter aortic valve replacement)    H/O carotid endarterectomy  Right    A-V fistula  left arm 5/2017    H/O angioplasty  2013,  no  intervention    H/O left knee surgery    H/O circumcision  at  age  65      FAMILY HISTORY:  FH: type 2 diabetes    Family history of premature CAD    Family history of lung cancer (Grandparent)    Family history of cancer (Grandparent)      PAST SOCIAL HISTORY:    ALLERGIES & MEDICATIONS  --------------------------------------------------------------------------------  Allergies    Plavix (Hives)  Toprol-XL (Rash)    Intolerances      Standing Inpatient Medications  atorvastatin 80 milliGRAM(s) Oral at bedtime  DULoxetine 60 milliGRAM(s) Oral daily  epoetin juan-epbx (RETACRIT) Injectable 23808 Unit(s) IV Push <User Schedule>  gabapentin 100 milliGRAM(s) Oral two times a day  hydrALAZINE 50 milliGRAM(s) Oral two times a day  isosorbide   mononitrate ER Tablet (IMDUR) 30 milliGRAM(s) Oral daily  Nephro-pito 1 Tablet(s) Oral daily  NIFEdipine XL 90 milliGRAM(s) Oral daily  pantoprazole  Injectable 40 milliGRAM(s) IV Push every 12 hours  torsemide 20 milliGRAM(s) Oral <User Schedule>    PRN Inpatient Medications  acetaminophen   Tablet .. 650 milliGRAM(s) Oral every 6 hours PRN      REVIEW OF SYSTEMS  --------------------------------------------------------------------------------  Gen: No weight changes, fatigue, fevers/chills, weakness  Skin: No rashes  Head/Eyes/Ears/Mouth: No headache; Normal hearing; Normal vision w/o blurriness; No sinus pain/discomfort, sore throat  Respiratory: No dyspnea, cough, wheezing, hemoptysis  CV: No chest pain, PND, orthopnea  GI: No abdominal pain,  +diarrhea, No constipation, nausea, vomiting,  + melena, hematochezia  : No increased frequency, dysuria, hematuria, nocturia  MSK: No joint pain/swelling; no back pain; no edema  Neuro: No dizziness/lightheadedness, weakness, seizures, numbness, tingling  Heme: No easy bruising or bleeding  Endo: No heat/cold intolerance  Psych: No significant nervousness, anxiety, stress, depression    All other systems were reviewed and are negative, except as noted.    VITALS/PHYSICAL EXAM  --------------------------------------------------------------------------------  T(C): 37.1 (03-15-21 @ 05:56), Max: 37.1 (03-15-21 @ 05:56)  HR: 74 (03-15-21 @ 05:56) (60 - 74)  BP: 148/57 (03-15-21 @ 05:56) (127/61 - 162/53)  RR: 18 (03-15-21 @ 05:56) (18 - 18)  SpO2: 95% (03-15-21 @ 05:56) (93% - 99%)  Height (cm): 165.1 (03-13-21 @ 17:39)  Weight (kg): 70.3 (03-13-21 @ 17:39)  BMI (kg/m2): 25.8 (03-13-21 @ 17:39)  BSA (m2): 1.77 (03-13-21 @ 17:39)      03-13-21 @ 06:01  -  03-14-21 @ 07:00  --------------------------------------------------------  IN: 597 mL / OUT: 150 mL / NET: 447 mL    03-14-21 @ 07:01  -  03-15-21 @ 06:08  --------------------------------------------------------  IN: 480 mL / OUT: 1000 mL / NET: -520 mL      Physical Exam:  	Gen: NAD, well-appearing  	HEENT: PERRL, supple neck, clear oropharynx  	Pulm: CTA B/L  	CV: RRR, S1S2; no rub  	Back: No spinal or CVA tenderness; no sacral edema  	Abd: +BS, soft, nontender/nondistended  	: No suprapubic tenderness  	UE: Warm, FROM, no clubbing, intact strength; no edema; no asterixis  	LE: Warm, FROM, no clubbing, intact strength; no edema  	Neuro: No focal deficits, intact gait  	Psych: Normal affect and mood  	Skin: Warm, without rashes  	Vascular access:    LABS/STUDIES  --------------------------------------------------------------------------------              9.3    5.63  >-----------<  123      [03-15-21 @ 05:48]              28.9     141  |  95  |  112.0  ----------------------------<  130      [03-14-21 @ 09:43]  4.1   |  23.0  |  5.25        Ca     8.7     [03-14-21 @ 09:43]      Mg     2.3     [03-14-21 @ 09:43]      Phos  6.6     [03-14-21 @ 09:43]    TPro  5.6  /  Alb  3.3  /  TBili  0.3  /  DBili  x   /  AST  12  /  ALT  10  /  AlkPhos  82  [03-13-21 @ 18:31]    PT/INR: PT 14.0 , INR 1.22       [03-13-21 @ 18:31]  PTT: 25.4       [03-13-21 @ 18:31]    Troponin 0.13      [03-14-21 @ 09:43]    Creatinine Trend:  SCr 5.25 [03-14 @ 09:43]  SCr 4.81 [03-13 @ 18:31]    Urinalysis - [10-29-20 @ 10:04]      Color Yellow / Appearance Clear / SG 1.005 / pH 6.5      Gluc 100 / Ketone Trace  / Bili Negative / Urobili Negative       Blood Small / Protein 100 / Leuk Est Negative / Nitrite Negative      RBC 0-2 / WBC 3-5 / Hyaline  / Gran  / Sq Epi  / Non Sq Epi Negative / Bacteria Negative      Iron 53, TIBC 266, %sat 20      [08-19-20 @ 06:32]  Ferritin 184      [08-19-20 @ 06:32]  PTH -- (Ca 9.6)      [08-19-20 @ 16:08]   91  Vitamin D (25OH) 26.4      [08-19-20 @ 16:08]  HbA1c 4.9      [08-09-18 @ 05:54]  TSH 2.16      [09-15-20 @ 02:01]    HBsAb <3.0      [06-25-20 @ 02:07]  HBsAg Nonreact      [06-25-20 @ 02:07]  HBcAb Nonreact      [06-25-20 @ 02:07]  HCV 0.11, Nonreact      [06-25-20 @ 02:07]

## 2021-03-14 NOTE — CONSULT NOTE ADULT - PROBLEM SELECTOR RECOMMENDATION 9
?melena- now with severe anemia. Pt on ASA, plavix and motrin   hx of PUD  Protonix IV BID  trasfuse for to hemoglobin greater than 8. ( pt had 3 units so far, but I suspect he will need at least one more unit. Post transfusion hemoglobin pending)   - NPO after midnight for possible EGD. Will need cardiac clearance. I spoke to renal. Pt get dialysis monday and friday. He will do dialysis today with anticipation for EGD in am ?melena- now with severe anemia. Pt on ASA, plavix and motrin   hx of PUD  Protonix IV BID  transfuse for to hemoglobin greater than 8. ( pt had 2 units so far, but I suspect he will need at least one more unit. Post transfusion hemoglobin pending)   - addendum- hemoglobin 7.1. Will give 2 units PRBC during dialysis   - NPO after midnight for possible EGD. Will need cardiac clearance. I spoke to renal. Pt get dialysis monday and friday. He will do dialysis today with anticipation for EGD in am

## 2021-03-14 NOTE — CONSULT NOTE ADULT - SUBJECTIVE AND OBJECTIVE BOX
Patient is a 86y old  Male who presents with a chief complaint of symptomatic anemia (14 Mar 2021 09:18)      HPI:  85 y/o male with PMHx of HTN, HLD, CAD, HFpEF, s/p Right CEA for Carotid artery disease, ESRD on HD 2d/week (M/Fri) via LUE fistula, s/p flecainide w/ ILR placed 6/2020, AS s/p TAVR, PAD with RLE revasc on 10/2020 on Plavix, anemia requiring prior transfusions, following at Dr Varghese office for amy, who presents to ED for worsening weakness for last week in setting of noted darkened stools.  Pt with LL leg pain. Taking motrin 600 mg qd for weeks.  ( takes asa and plavix for LE stent) . NO PPI listed on meds list.  Feeling weak while ambulating with walker.   Has chronic anemia. Hgb 1/28 was 11.1, hb 2/11 10.3, hemoglobin 3.4 hemoglobin 8.7.  Yesterday hemoglobin 5.6 . had u nits PRBC and post transfusion hemoglobin pending.  Patient has GI work up in past for anemia.  now 2019- colonoscopy showed  - 3 large polyps that were removed ( 1 cm in ascending, 2.5 cm in ascending  and 3 cm in rectum. Multiple small polyps removed as well.  Path was tubular adenoma.  Had  AVM and diverticulosis.  Patient then had F/U colonoscopy to remove the residual rectal polyp in 6/2020.   Had EGD in 11/2020 which showed 3 mm antral ulcer which was clipped, no AVM's. Enteroscopy done as well. No lesions noted     REVIEW OF SYSTEMS:  Constitutional: No fever, weight loss or fatigue  ENMT:  No difficulty hearing, tinnitus, vertigo; No sinus or throat pain  Respiratory: No cough, wheezing, chills or hemoptysis  Cardiovascular: No chest pain, palpitations, dizziness or leg swelling  Gastrointestinal: No abdominal or epigastric pain. No nausea, vomiting or hematemesis; No diarrhea or constipation. No melena or hematochezia.  Skin: No itching, burning, rashes or lesions   Musculoskeletal: No joint pain or swelling; No muscle, back or extremity pain    PAST MEDICAL & SURGICAL HISTORY:  GI bleeding  due to ulcerated polyps and colonic AVMs    Aortic stenosis  - s/p valve replacement    ESRD on dialysis  HD on Mondays and Fridays    Risk factors for obstructive sleep apnea    Anemia    RICHARDS (dyspnea on exertion)    VT (ventricular tachycardia)    HTN (hypertension)    CAD (coronary artery disease)    Arrhythmia    AV block, 1st degree    DM (diabetes mellitus)  - resolved    History of loop recorder    S/P TAVR (transcatheter aortic valve replacement)    H/O carotid endarterectomy  Right    A-V fistula  left arm 5/2017    H/O angioplasty  2013,  no  intervention    H/O left knee surgery    H/O circumcision  at  age  65        FAMILY HISTORY:  FH: type 2 diabetes    Family history of premature CAD    Family history of lung cancer (Grandparent)    Family history of cancer (Grandparent)        SOCIAL HISTORY:  Smoking Status: [ ] Current, [ ] Former, [ ] Never  Pack Years:  [  ] EtOH  [  ] IVDA    MEDICATIONS:  MEDICATIONS  (STANDING):  atorvastatin 80 milliGRAM(s) Oral at bedtime  DULoxetine 60 milliGRAM(s) Oral daily  gabapentin 100 milliGRAM(s) Oral two times a day  hydrALAZINE 50 milliGRAM(s) Oral two times a day  isosorbide   mononitrate ER Tablet (IMDUR) 30 milliGRAM(s) Oral daily  Nephro-pito 1 Tablet(s) Oral daily  NIFEdipine IR 60 milliGRAM(s) Oral <User Schedule>  NIFEdipine XL 90 milliGRAM(s) Oral daily  pantoprazole  Injectable 40 milliGRAM(s) IV Push every 12 hours  torsemide 20 milliGRAM(s) Oral <User Schedule>    MEDICATIONS  (PRN):  acetaminophen   Tablet .. 650 milliGRAM(s) Oral every 6 hours PRN Temp greater or equal to 38C (100.4F), Mild Pain (1 - 3)      Allergies    Plavix (Hives)  Toprol-XL (Rash)    Intolerances        Vital Signs Last 24 Hrs  T(C): 36.7 (14 Mar 2021 09:33), Max: 37.2 (14 Mar 2021 03:50)  T(F): 98.1 (14 Mar 2021 09:33), Max: 99 (14 Mar 2021 03:50)  HR: 67 (14 Mar 2021 09:33) (60 - 105)  BP: 133/64 (14 Mar 2021 09:33) (118/51 - 146/57)  BP(mean): 89 (13 Mar 2021 20:25) (87 - 89)  RR: 18 (14 Mar 2021 09:33) (18 - 22)  SpO2: 95% (14 Mar 2021 09:33) (91% - 100%)    03-13 @ 06:01  -  03-14 @ 07:00  --------------------------------------------------------  IN: 597 mL / OUT: 150 mL / NET: 447 mL          PHYSICAL EXAM:    General: Well developed; well nourished; in no acute distress  HEENT: MMM, conjunctiva and sclera clear  Gastrointestinal: Soft, non-tender non-distended; Normal bowel sounds; No rebound or guarding  Extremities: Normal range of motion, No clubbing, cyanosis or edema  Neurological: Alert and oriented x3  Skin: Warm and dry. No obvious rash      LABS:                        7.1    5.87  )-----------( 124      ( 14 Mar 2021 09:43 )             22.5     14 Mar 2021 09:43    141    |  95     |  112.0  ----------------------------<  130    4.1     |  23.0   |  5.25     Ca    8.7        14 Mar 2021 09:43  Phos  6.6       14 Mar 2021 09:43  Mg     2.3       14 Mar 2021 09:43    TPro  5.6    /  Alb  3.3    /  TBili  0.3    /  DBili  x      /  AST  12     /  ALT  10     /  AlkPhos  82     / Amylase x      /Lipase x      13 Mar 2021 18:31              RADIOLOGY & ADDITIONAL STUDIES:      Patient is a 86y old  Male who presents with a chief complaint of symptomatic anemia (14 Mar 2021 09:18)      HPI:  85 y/o male with PMHx of HTN, HLD, CAD, HFpEF, s/p Right CEA for Carotid artery disease, ESRD on HD 2d/week (M/Fri) via LUE fistula, s/p flecainide w/ ILR placed 6/2020, AS s/p TAVR, PAD with RLE revasc on 10/2020 on Plavix, anemia requiring prior transfusions, following at Dr Baker's office for amy, who presents to ED for worsening weakness for last week in setting of noted darkened stools ( saw dark stool 2 days ago) .  Pt with LL leg pain. Taking motrin 600 mg qd for one month.  ( takes asa and plavix for LE stent) . Taking protonix .  Feeling weak while ambulating with walker.   Has chronic anemia. Hgb 1/28 was 11.1, hb 2/11 10.3, hemoglobin 3/4 hemoglobin 8.7.  Yesterday hemoglobin 5.6 . Yesterday pt  Had 2 u nits PRBC and post transfusion hemoglobin pending.   Patient has GI work up in past for anemia.  Now 2019- colonoscopy showed  - 3 large polyps that were removed ( 1 cm in ascending, 2.5 cm in ascending  and 3 cm in rectum. Multiple small polyps removed as well.  Path was tubular adenoma.  Had  AVM and diverticulosis.  Patient then had F/U colonoscopy to remove the residual rectal polyp in 6/2020.   Had EGD in 11/2020 which showed 3 mm antral ulcer which was clipped, no AVM's. Enteroscopy done as well. No lesions noted     REVIEW OF SYSTEMS:  Constitutional: No fever, weight loss or fatigue  ENMT:  No difficulty hearing, tinnitus, vertigo; No sinus or throat pain  Respiratory: No cough, wheezing, chills or hemoptysis  Cardiovascular: No chest pain, palpitations, dizziness or leg swelling  Gastrointestinal: No abdominal or epigastric pain. No nausea, vomiting or hematemesis; No diarrhea or constipation. No melena or hematochezia.  Skin: No itching, burning, rashes or lesions   Musculoskeletal: No joint pain or swelling; No muscle, back or extremity pain    PAST MEDICAL & SURGICAL HISTORY:  GI bleeding  due to ulcerated polyps and colonic AVMs    Aortic stenosis  - s/p valve replacement    ESRD on dialysis  HD on Mondays and Fridays    Risk factors for obstructive sleep apnea    Anemia    RICHARDS (dyspnea on exertion)    VT (ventricular tachycardia)    HTN (hypertension)    CAD (coronary artery disease)    Arrhythmia    AV block, 1st degree    DM (diabetes mellitus)  - resolved    History of loop recorder    S/P TAVR (transcatheter aortic valve replacement)    H/O carotid endarterectomy  Right    A-V fistula  left arm 5/2017    H/O angioplasty  2013,  no  intervention    H/O left knee surgery    H/O circumcision  at  age  65        FAMILY HISTORY:  FH: type 2 diabetes    Family history of premature CAD    Family history of lung cancer (Grandparent)    Family history of cancer (Grandparent)        SOCIAL HISTORY:  Smoking Status: [ ] Current, [ ] Former, [ ] Never  Pack Years:  [  ] EtOH  [  ] IVDA    MEDICATIONS:  MEDICATIONS  (STANDING):  atorvastatin 80 milliGRAM(s) Oral at bedtime  DULoxetine 60 milliGRAM(s) Oral daily  gabapentin 100 milliGRAM(s) Oral two times a day  hydrALAZINE 50 milliGRAM(s) Oral two times a day  isosorbide   mononitrate ER Tablet (IMDUR) 30 milliGRAM(s) Oral daily  Nephro-pito 1 Tablet(s) Oral daily  NIFEdipine IR 60 milliGRAM(s) Oral <User Schedule>  NIFEdipine XL 90 milliGRAM(s) Oral daily  pantoprazole  Injectable 40 milliGRAM(s) IV Push every 12 hours  torsemide 20 milliGRAM(s) Oral <User Schedule>    MEDICATIONS  (PRN):  acetaminophen   Tablet .. 650 milliGRAM(s) Oral every 6 hours PRN Temp greater or equal to 38C (100.4F), Mild Pain (1 - 3)      Allergies    Plavix (Hives)  Toprol-XL (Rash)    Intolerances        Vital Signs Last 24 Hrs  T(C): 36.7 (14 Mar 2021 09:33), Max: 37.2 (14 Mar 2021 03:50)  T(F): 98.1 (14 Mar 2021 09:33), Max: 99 (14 Mar 2021 03:50)  HR: 67 (14 Mar 2021 09:33) (60 - 105)  BP: 133/64 (14 Mar 2021 09:33) (118/51 - 146/57)  BP(mean): 89 (13 Mar 2021 20:25) (87 - 89)  RR: 18 (14 Mar 2021 09:33) (18 - 22)  SpO2: 95% (14 Mar 2021 09:33) (91% - 100%)    03-13 @ 06:01  -  03-14 @ 07:00  --------------------------------------------------------  IN: 597 mL / OUT: 150 mL / NET: 447 mL          PHYSICAL EXAM:    General: elderly ,; in no acute distress  HEENT: MMM, conjunctiva and sclera clear  H- RRR  l- CTA  Gastrointestinal: Soft, non-tender non-distended; Normal bowel sounds; No rebound or guarding  Extremities: Normal range of motion, No clubbing, cyanosis or edema  Neurological: Alert and oriented x3  Skin: Warm and dry. No obvious rash  rectal- did not perform- pt was eating lunch       LABS:                        7.1    5.87  )-----------( 124      ( 14 Mar 2021 09:43 )             22.5     14 Mar 2021 09:43    141    |  95     |  112.0  ----------------------------<  130    4.1     |  23.0   |  5.25     Ca    8.7        14 Mar 2021 09:43  Phos  6.6       14 Mar 2021 09:43  Mg     2.3       14 Mar 2021 09:43    TPro  5.6    /  Alb  3.3    /  TBili  0.3    /  DBili  x      /  AST  12     /  ALT  10     /  AlkPhos  82     / Amylase x      /Lipase x      13 Mar 2021 18:31              RADIOLOGY & ADDITIONAL STUDIES:

## 2021-03-14 NOTE — PROGRESS NOTE ADULT - ASSESSMENT
ASSESSMENT: Patient is a 86y old m with severe anemia presenting with rest pain of LLE.    PLAN:    - Recommend B/P DANNA/PVRs  - Hold Plavix due to severe anemia  - multimodal pain regimen  - Vascular Surgery will continue to follow

## 2021-03-14 NOTE — PROGRESS NOTE ADULT - ASSESSMENT
INCOMPLETE 86 years old male with history of CAD, PAD s/p stent, Carotid Stenosis s/p CEA, HLD, HTN, HLD, HFpEF, ESRD on HD twice a week (M/Fri) via LUE fistula, s/p ILR placed 6/2020, AS s/p TAVR and Anemia requiring prior transfusions, presented with worsening weakness and dyspnea on exertion - found to have Hb of 5.6.     1) Acute Blood Loss Anemia  - Likely due to Upper GI Bleed  - History of gastric ulcer  - Hold Plavix  - NO NSAIDs  - Continue Protonix 40 mg IVP BID  - s/p 2 units of PRBCs, will transfuse 1 more unit during dialysis  - GI Consult appreciated: plan for EGD in am  - Discussed with Cardio - Dr. Cervantes, patient is cleared from cardiology standpoint for EGD.  - Clear liquid diet for now. NPO after midnight.    2) PAD  - s/p RLE angiogram with angioplasty June 2020 + s/p RLE angio/SFA angioplasty/peroneal angioplasty and pop stents x 3 on 10/22  - Vascular Surgery following  - DANNA/PVRs today   - Plavix on hold  - Continue Lipitor     3) CAD / Carotid Stenosis / HLD / HTN  - Plavix on hold  - Continue Lipitor, Imdur, Nifedipine and Hydralazine    4) Chronic Diastolic Heart Failure  - Continue Torsemide   - Cardiology Consult appreciated     5) ESRD   - On dialysis (Monday and Friday)  - HD today  - Renal Consult appreciated     DVT Prophylaxis -- SCDs    Dispo: Likely home in 2-3 days.

## 2021-03-15 ENCOUNTER — APPOINTMENT (OUTPATIENT)
Dept: ELECTROPHYSIOLOGY | Facility: CLINIC | Age: 86
End: 2021-03-15
Payer: MEDICARE

## 2021-03-15 ENCOUNTER — NON-APPOINTMENT (OUTPATIENT)
Age: 86
End: 2021-03-15

## 2021-03-15 LAB
ANION GAP SERPL CALC-SCNC: 15 MMOL/L — SIGNIFICANT CHANGE UP (ref 5–17)
BUN SERPL-MCNC: 59 MG/DL — HIGH (ref 8–20)
CALCIUM SERPL-MCNC: 8.9 MG/DL — SIGNIFICANT CHANGE UP (ref 8.6–10.2)
CHLORIDE SERPL-SCNC: 98 MMOL/L — SIGNIFICANT CHANGE UP (ref 98–107)
CO2 SERPL-SCNC: 27 MMOL/L — SIGNIFICANT CHANGE UP (ref 22–29)
CREAT SERPL-MCNC: 3.82 MG/DL — HIGH (ref 0.5–1.3)
CULTURE RESULTS: SIGNIFICANT CHANGE UP
GLUCOSE SERPL-MCNC: 103 MG/DL — HIGH (ref 70–99)
HCT VFR BLD CALC: 28.9 % — LOW (ref 39–50)
HGB BLD-MCNC: 9.3 G/DL — LOW (ref 13–17)
MAGNESIUM SERPL-MCNC: 2.1 MG/DL — SIGNIFICANT CHANGE UP (ref 1.8–2.6)
MCHC RBC-ENTMCNC: 29.6 PG — SIGNIFICANT CHANGE UP (ref 27–34)
MCHC RBC-ENTMCNC: 32.2 GM/DL — SIGNIFICANT CHANGE UP (ref 32–36)
MCV RBC AUTO: 92 FL — SIGNIFICANT CHANGE UP (ref 80–100)
PLATELET # BLD AUTO: 123 K/UL — LOW (ref 150–400)
POTASSIUM SERPL-MCNC: 3.5 MMOL/L — SIGNIFICANT CHANGE UP (ref 3.5–5.3)
POTASSIUM SERPL-SCNC: 3.5 MMOL/L — SIGNIFICANT CHANGE UP (ref 3.5–5.3)
RBC # BLD: 3.14 M/UL — LOW (ref 4.2–5.8)
RBC # FLD: 22.6 % — HIGH (ref 10.3–14.5)
SODIUM SERPL-SCNC: 140 MMOL/L — SIGNIFICANT CHANGE UP (ref 135–145)
SPECIMEN SOURCE: SIGNIFICANT CHANGE UP
WBC # BLD: 5.63 K/UL — SIGNIFICANT CHANGE UP (ref 3.8–10.5)
WBC # FLD AUTO: 5.63 K/UL — SIGNIFICANT CHANGE UP (ref 3.8–10.5)

## 2021-03-15 PROCEDURE — G2066: CPT

## 2021-03-15 PROCEDURE — 99233 SBSQ HOSP IP/OBS HIGH 50: CPT

## 2021-03-15 PROCEDURE — 43255 EGD CONTROL BLEEDING ANY: CPT

## 2021-03-15 PROCEDURE — 93298 REM INTERROG DEV EVAL SCRMS: CPT

## 2021-03-15 RX ADMIN — Medication 650 MILLIGRAM(S): at 00:45

## 2021-03-15 RX ADMIN — PANTOPRAZOLE SODIUM 40 MILLIGRAM(S): 20 TABLET, DELAYED RELEASE ORAL at 11:53

## 2021-03-15 RX ADMIN — ATORVASTATIN CALCIUM 80 MILLIGRAM(S): 80 TABLET, FILM COATED ORAL at 21:06

## 2021-03-15 RX ADMIN — Medication 650 MILLIGRAM(S): at 06:36

## 2021-03-15 RX ADMIN — Medication 50 MILLIGRAM(S): at 17:05

## 2021-03-15 RX ADMIN — ISOSORBIDE MONONITRATE 30 MILLIGRAM(S): 60 TABLET, EXTENDED RELEASE ORAL at 11:53

## 2021-03-15 RX ADMIN — Medication 50 MILLIGRAM(S): at 05:59

## 2021-03-15 RX ADMIN — Medication 90 MILLIGRAM(S): at 05:59

## 2021-03-15 RX ADMIN — Medication 650 MILLIGRAM(S): at 21:06

## 2021-03-15 RX ADMIN — Medication 650 MILLIGRAM(S): at 21:45

## 2021-03-15 RX ADMIN — GABAPENTIN 100 MILLIGRAM(S): 400 CAPSULE ORAL at 17:04

## 2021-03-15 RX ADMIN — PANTOPRAZOLE SODIUM 40 MILLIGRAM(S): 20 TABLET, DELAYED RELEASE ORAL at 21:06

## 2021-03-15 RX ADMIN — Medication 1 TABLET(S): at 11:53

## 2021-03-15 RX ADMIN — Medication 650 MILLIGRAM(S): at 05:59

## 2021-03-15 RX ADMIN — DULOXETINE HYDROCHLORIDE 60 MILLIGRAM(S): 30 CAPSULE, DELAYED RELEASE ORAL at 11:52

## 2021-03-15 NOTE — PROGRESS NOTE ADULT - SUBJECTIVE AND OBJECTIVE BOX
[facility] DIVISION OF KIDNEY DISEASES AND HYPERTENSION -- HEMODIALYSIS NOTE  --------------------------------------------------------------------------------  Chief Complaint: ESRD/Ongoing hemodialysis requirement    24 hour events/subjective:  S/P EGD    PAST HISTORY  --------------------------------------------------------------------------------  No significant changes to PMH, PSH, FHx, SHx, unless otherwise noted    ALLERGIES & MEDICATIONS  --------------------------------------------------------------------------------  Allergies    Plavix (Hives)  Toprol-XL (Rash)    Standing Inpatient Medications  atorvastatin 80 milliGRAM(s) Oral at bedtime  DULoxetine 60 milliGRAM(s) Oral daily  epoetin juan-epbx (RETACRIT) Injectable 73393 Unit(s) IV Push <User Schedule>  gabapentin 100 milliGRAM(s) Oral two times a day  hydrALAZINE 50 milliGRAM(s) Oral two times a day  isosorbide   mononitrate ER Tablet (IMDUR) 30 milliGRAM(s) Oral daily  Nephro-pito 1 Tablet(s) Oral daily  NIFEdipine XL 90 milliGRAM(s) Oral daily  pantoprazole  Injectable 40 milliGRAM(s) IV Push every 12 hours  torsemide 20 milliGRAM(s) Oral <User Schedule>    PRN Inpatient Medications  acetaminophen   Tablet .. 650 milliGRAM(s) Oral every 6 hours PRN    REVIEW OF SYSTEMS  --------------------------------------------------------------------------------  Gen: No weight changes, fatigue, fevers/chills, weakness  Skin: No rashes  Head/Eyes/Ears/Mouth: No headache; Normal hearing; Normal vision w/o blurriness; No sinus pain/discomfort, sore throat  Respiratory: No dyspnea, cough, wheezing, hemoptysis  CV: No chest pain, PND, orthopnea  GI: No abdominal pain, diarrhea, constipation, nausea, vomiting, +  melena, hematochezia  : No increased frequency, dysuria, hematuria, nocturia  MSK: No joint pain/swelling; no back pain; no edema  Neuro: No dizziness/lightheadedness, weakness, seizures, numbness, tingling  Heme: No easy bruising or bleeding  Endo: No heat/cold intolerance  Psych: No significant nervousness, anxiety, stress, depression    All other systems were reviewed and are negative, except as noted.    VITALS/PHYSICAL EXAM  --------------------------------------------------------------------------------  T(C): 37.1 (03-15-21 @ 05:56), Max: 37.1 (03-15-21 @ 05:56)  HR: 70 (03-15-21 @ 08:12) (60 - 74)  BP: 122/53 (03-15-21 @ 11:38) (122/53 - 162/53)  RR: 20 (03-15-21 @ 08:12) (18 - 20)  SpO2: 93% (03-15-21 @ 08:12) (93% - 95%)  Height (cm): 165.1 (03-13-21 @ 17:39)  Weight (kg): 70.3 (03-13-21 @ 17:39)  BMI (kg/m2): 25.8 (03-13-21 @ 17:39)  BSA (m2): 1.77 (03-13-21 @ 17:39)      03-14-21 @ 07:01  -  03-15-21 @ 07:00  --------------------------------------------------------  IN: 480 mL / OUT: 1000 mL / NET: -520 mL      Physical Exam:  	Gen: NAD, well-appearing  	HEENT: PERRL, supple neck,   	Pulm: CTA B/L  	CV: RRR, S1S2; no rub  	Back: No spinal or CVA tenderness; no sacral edema  	Abd: +BS, soft, nontender/nondistended  	: No suprapubic tenderness  	UE: Warm, FROM, no clubbing, intact strength; no edema; no asterixis  	LE: Warm, FROM, no clubbing, intact strength; no edema  	Neuro: No focal deficits, intact gait  	Psych: Normal affect and mood  	Skin: Warm, without rashes  	Vascular access: AVF    LABS/STUDIES  --------------------------------------------------------------------------------              9.3    5.63  >-----------<  123      [03-15-21 @ 05:48]              28.9     140  |  98  |  59.0  ----------------------------<  103      [03-15-21 @ 05:48]  3.5   |  27.0  |  3.82        Ca     8.9     [03-15-21 @ 05:48]      Mg     2.1     [03-15-21 @ 05:48]      Phos  6.6     [03-14-21 @ 09:43]    TPro  5.6  /  Alb  3.3  /  TBili  0.3  /  DBili  x   /  AST  12  /  ALT  10  /  AlkPhos  82  [03-13-21 @ 18:31]    PT/INR: PT 14.0 , INR 1.22       [03-13-21 @ 18:31]  PTT: 25.4       [03-13-21 @ 18:31]    Troponin 0.13      [03-14-21 @ 09:43]    Iron 53, TIBC 266, %sat 20      [08-19-20 @ 06:32]  Ferritin 184      [08-19-20 @ 06:32]  PTH -- (Ca 9.6)      [08-19-20 @ 16:08]   91  Vitamin D (25OH) 26.4      [08-19-20 @ 16:08]  HbA1c 4.9      [08-09-18 @ 05:54]  TSH 2.16      [09-15-20 @ 02:01]    EGD Findings as Below ( Per GI )

## 2021-03-15 NOTE — PROGRESS NOTE ADULT - ASSESSMENT
86 years old male with history of CAD, PAD s/p stent, Carotid Stenosis s/p CEA, HLD, HTN, HLD, HFpEF, ESRD on HD twice a week (M/Fri) via LUE fistula, s/p ILR placed 6/2020, AS s/p TAVR and Anemia requiring prior transfusions, presented with worsening weakness and dyspnea on exertion - found to have Hb of 5.6.     1) Acute Blood Loss Anemia  - Likely due to Upper GI Bleed  - History of gastric ulcer  - s/p EGD: Pt with possible esophageal candidiasis and brushings taken. 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 Duodenum was entered and found to be normal D1 and D2.  - Hold Plavix today, can restart tomorrow as per GI  - NO NSAIDs  - Continue Protonix 40 mg IVP BID  - s/p 4 units of PRBCs  - GI following     2) PAD  - s/p RLE angiogram with angioplasty June 2020 + s/p RLE angio/SFA angioplasty/peroneal angioplasty and pop stents x 3 October 20  - DANNA/PVRs with significant PAD   - Plavix on hold  - Continue Lipitor   - Vascular Surgery following: plan for angiogram tomorrow or day after     3) CAD / Carotid Stenosis / HLD / HTN  - Plavix on hold  - Continue Lipitor, Imdur, Nifedipine and Hydralazine  - Cardiology following     4) Chronic Diastolic Heart Failure  - Continue Torsemide   - Cardiology Consult appreciated     5) ESRD   - On dialysis (Monday and Friday)  - s/p HD on 3/14  - Renal following     DVT Prophylaxis -- SCDs    Dispo: Likely home once cleared by Vascular Surgery.

## 2021-03-15 NOTE — PROGRESS NOTE ADULT - ASSESSMENT
· Operative Findings	Possible esophageal candidiasis and brushings taken. 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 Duodenum was entered and found to be normal D1 and D2.        · Comments : 	Pt. likely bled from the above gastric AVM's.       HD in AM. To Resume Plavix  in AM,

## 2021-03-15 NOTE — BRIEF OPERATIVE NOTE - COMMENTS
Pt. likely bled from the above gastric AVM's. Possible esophageal candidiasis. Esophageal brushings taken. Will hold off on starting Diflucan pending esophageal brushings result. Pt. likely bled from the above gastric AVM's. Possible esophageal candidiasis. Esophageal brushings taken. Will hold off on starting Diflucan pending esophageal brushings result. Would hold Plavix today but can re-start Plavix tomorrow.

## 2021-03-15 NOTE — PROGRESS NOTE ADULT - SUBJECTIVE AND OBJECTIVE BOX
CHRISTIANO ELIZABETH    [id number]    History:    Patient seen and  examined this am.  Anemia improved s/p prbc transfusion.  Reports no hemoptysis, no recent bloody stool.   Reports left foot pain at rest.  Denies nausea, vomiting, chest pain, shortness of breath, abdominal pain or fever.       Vital Signs Last 24 Hrs  T(C): 37.1 (15 Mar 2021 05:56), Max: 37.1 (15 Mar 2021 05:56)  T(F): 98.8 (15 Mar 2021 05:56), Max: 98.8 (15 Mar 2021 05:56)  HR: 74 (15 Mar 2021 05:56) (60 - 74)  BP: 148/57 (15 Mar 2021 05:56) (127/61 - 162/53)  BP(mean): --  RR: 18 (15 Mar 2021 05:56) (18 - 18)  SpO2: 95% (15 Mar 2021 05:56) (93% - 99%)  I&O's Summary    13 Mar 2021 06:01  -  14 Mar 2021 07:00  --------------------------------------------------------  IN: 597 mL / OUT: 150 mL / NET: 447 mL    14 Mar 2021 07:01  -  15 Mar 2021 06:41  --------------------------------------------------------  IN: 480 mL / OUT: 1000 mL / NET: -520 mL                              9.3    5.63  )-----------( 123      ( 15 Mar 2021 05:48 )             28.9     03-15    140  |  98  |  59.0<H>  ----------------------------<  103<H>  3.5   |  27.0  |  3.82<H>    Ca    8.9      15 Mar 2021 05:48  Phos  6.6     03-14  Mg     2.1     03-15    TPro  5.6<L>  /  Alb  3.3  /  TBili  0.3<L>  /  DBili  x   /  AST  12  /  ALT  10  /  AlkPhos  82  03-13      MEDICATIONS  (STANDING):  atorvastatin 80 milliGRAM(s) Oral at bedtime  DULoxetine 60 milliGRAM(s) Oral daily  epoetin juan-epbx (RETACRIT) Injectable 76637 Unit(s) IV Push <User Schedule>  gabapentin 100 milliGRAM(s) Oral two times a day  hydrALAZINE 50 milliGRAM(s) Oral two times a day  isosorbide   mononitrate ER Tablet (IMDUR) 30 milliGRAM(s) Oral daily  Nephro-pito 1 Tablet(s) Oral daily  NIFEdipine XL 90 milliGRAM(s) Oral daily  pantoprazole  Injectable 40 milliGRAM(s) IV Push every 12 hours  torsemide 20 milliGRAM(s) Oral <User Schedule>    MEDICATIONS  (PRN):  acetaminophen   Tablet .. 650 milliGRAM(s) Oral every 6 hours PRN Temp greater or equal to 38C (100.4F), Mild Pain (1 - 3)      Physical exam:    No distress  alert and oriented  non labored breathing  abdomen is soft, non tender  left AVF reported with good thrill  bilateral lower extremities with non palpable pedals    Primary Assessment:  • ESRD, improved anemia  - arterial insufficiency, lle rest pain       :   •   Plan:   • follow up EGD planned for today  - patient will ultimately need investigative angiogram

## 2021-03-15 NOTE — PROGRESS NOTE ADULT - SUBJECTIVE AND OBJECTIVE BOX
Alcohol dependence with withdrawal    HPI:  87 y/o male with PMHx of HTN, HLD, CAD, HFpEF, s/p Right CEA for Carotid artery disease, ESRD on HD 2d/week (M/Fri) via LUE fistula, s/p flecainide w/ ILR placed 6/2020, AS s/p TAVR, PAD with RLE revasc on 10/2020 on Plavix, anemia requiring prior transfusions, following at Dr Varghese office for aricept, who presents to ED for worsening weakness for last week in setting of noted darkened stools. Daughter who is RN Manager at [employer] present at bedside to also provide collateral history. Patient has been having worsening left leg pain, extremity that was not stented as per patient, for which he has been taking 600mg of ibuprofen daily for many weeks now for pain control. Has also been on plavix for lower extremity stent. Per patient, earlier in the day was in kitchen using walker where he suddenly felt weak and "almost collapsed" but was helped by wife to get seated. Denies losing consciousness, syncopating or falling to floor. Endorses having dark stools over last few days. Has been having worsening dyspnea on exertion but denies any chest pain. Of note, daughter at bedside does say that he has been looking much more weaker now from baseline. Recently was seen by Dr. Baker in last week, was told his Hb was around 8 and was told it was stable.     In ED: noted occult positive, hb 5.6, 2 units prbcs ordered for transfusion, EKG noted: V Paced, rate 64, no evidence of acute ischemic changes. Admission called.  (13 Mar 2021 21:16)    Interval History:  Patient was seen and examined at bedside after EGD. Very sleepy due to anesthesia.   No active complaints at that time.      ROS:  As per interval history otherwise unremarkable.    PHYSICAL EXAM:  Vital Signs   T(C): 37.1 (15 Mar 2021 05:56), Max: 37.1 (15 Mar 2021 05:56)  T(F): 98.8 (15 Mar 2021 05:56), Max: 98.8 (15 Mar 2021 05:56)  HR: 70 (15 Mar 2021 08:12) (60 - 74)  BP: 122/53 (15 Mar 2021 11:38) (122/53 - 162/53)  RR: 20 (15 Mar 2021 08:12) (18 - 20)  SpO2: 93% (15 Mar 2021 08:12) (93% - 95%)  General: Elderly male lying in bed comfortably. No acute distress  HEENT: EOMI. Clear conjunctivae. Moist mucus membrane  Neck: Supple.   Chest: CTA bilaterally. No wheezing, rales or rhonchi.   Heart: Normal S1 & S2. RRR.   Abdomen: Soft. Non-tender. Non-distended. + BS  Ext: 2+ pedal edema. No calf tenderness   Neuro: Active and alert. No focal deficit. No speech disorder  Skin: Warm and Dry  Psychiatry: Normal mood and affect    I&O's Summary    14 Mar 2021 07:01  -  15 Mar 2021 07:00  --------------------------------------------------------  IN: 480 mL / OUT: 1000 mL / NET: -520 mL    MEDICATIONS  (STANDING):  atorvastatin 80 milliGRAM(s) Oral at bedtime  DULoxetine 60 milliGRAM(s) Oral daily  epoetin juan-epbx (RETACRIT) Injectable 54330 Unit(s) IV Push <User Schedule>  gabapentin 100 milliGRAM(s) Oral two times a day  hydrALAZINE 50 milliGRAM(s) Oral two times a day  isosorbide   mononitrate ER Tablet (IMDUR) 30 milliGRAM(s) Oral daily  Nephro-pito 1 Tablet(s) Oral daily  NIFEdipine XL 90 milliGRAM(s) Oral daily  pantoprazole  Injectable 40 milliGRAM(s) IV Push every 12 hours  torsemide 20 milliGRAM(s) Oral <User Schedule>    MEDICATIONS  (PRN):  acetaminophen   Tablet .. 650 milliGRAM(s) Oral every 6 hours PRN Temp greater or equal to 38C (100.4F), Mild Pain (1 - 3)    LABS:                        9.3    5.63  )-----------( 123      ( 15 Mar 2021 05:48 )             28.9     03-15    140  |  98  |  59.0<H>  ----------------------------<  103<H>  3.5   |  27.0  |  3.82<H>    Ca    8.9      15 Mar 2021 05:48  Phos  6.6     03-14  Mg     2.1     03-15    TPro  5.6<L>  /  Alb  3.3  /  TBili  0.3<L>  /  DBili  x   /  AST  12  /  ALT  10  /  AlkPhos  82  03-13    PT/INR - ( 13 Mar 2021 18:31 )   PT: 14.0 sec;   INR: 1.22 ratio       PTT - ( 13 Mar 2021 18:31 )  PTT:25.4 sec  CARDIAC MARKERS ( 14 Mar 2021 09:43 )  x     / 0.13 ng/mL / x     / x     / x      CARDIAC MARKERS ( 13 Mar 2021 18:31 )  x     / 0.13 ng/mL / x     / x     / x        Blood Culture: 03-15 @ 10:59  Organism --  Gram Stain Blood -- Gram Stain --  Specimen Source .Smear Esophageal Brushings  Culture-Blood --    RADIOLOGY & ADDITIONAL STUDIES:  Reviewed

## 2021-03-15 NOTE — PROGRESS NOTE ADULT - SUBJECTIVE AND OBJECTIVE BOX
Pt in EGD  Tele reviewed SR with hailey pacng  echo revealed normal EF TAVR with normal gradient, mild MS mod MR, severe pul HTN  Above c/w EsRD on HD  profound anemia s/p transfusion GI workup in progress

## 2021-03-16 LAB
ANION GAP SERPL CALC-SCNC: 18 MMOL/L — HIGH (ref 5–17)
ANISOCYTOSIS BLD QL: SLIGHT — SIGNIFICANT CHANGE UP
BASOPHILS # BLD AUTO: 0.16 K/UL — SIGNIFICANT CHANGE UP (ref 0–0.2)
BASOPHILS NFR BLD AUTO: 2.6 % — HIGH (ref 0–2)
BLD GP AB SCN SERPL QL: SIGNIFICANT CHANGE UP
BUN SERPL-MCNC: 67 MG/DL — HIGH (ref 8–20)
BURR CELLS BLD QL SMEAR: PRESENT — SIGNIFICANT CHANGE UP
CALCIUM SERPL-MCNC: 9.1 MG/DL — SIGNIFICANT CHANGE UP (ref 8.6–10.2)
CHLORIDE SERPL-SCNC: 99 MMOL/L — SIGNIFICANT CHANGE UP (ref 98–107)
CO2 SERPL-SCNC: 25 MMOL/L — SIGNIFICANT CHANGE UP (ref 22–29)
CREAT SERPL-MCNC: 5.19 MG/DL — HIGH (ref 0.5–1.3)
ELLIPTOCYTES BLD QL SMEAR: SLIGHT — SIGNIFICANT CHANGE UP
EOSINOPHIL # BLD AUTO: 0.05 K/UL — SIGNIFICANT CHANGE UP (ref 0–0.5)
EOSINOPHIL NFR BLD AUTO: 0.9 % — SIGNIFICANT CHANGE UP (ref 0–6)
GLUCOSE SERPL-MCNC: 98 MG/DL — SIGNIFICANT CHANGE UP (ref 70–99)
HCT VFR BLD CALC: 33.1 % — LOW (ref 39–50)
HGB BLD-MCNC: 10 G/DL — LOW (ref 13–17)
LYMPHOCYTES # BLD AUTO: 0.42 K/UL — LOW (ref 1–3.3)
LYMPHOCYTES # BLD AUTO: 7 % — LOW (ref 13–44)
MACROCYTES BLD QL: SLIGHT — SIGNIFICANT CHANGE UP
MANUAL SMEAR VERIFICATION: SIGNIFICANT CHANGE UP
MCHC RBC-ENTMCNC: 29 PG — SIGNIFICANT CHANGE UP (ref 27–34)
MCHC RBC-ENTMCNC: 30.2 GM/DL — LOW (ref 32–36)
MCV RBC AUTO: 95.9 FL — SIGNIFICANT CHANGE UP (ref 80–100)
MONOCYTES # BLD AUTO: 0.69 K/UL — SIGNIFICANT CHANGE UP (ref 0–0.9)
MONOCYTES NFR BLD AUTO: 11.4 % — SIGNIFICANT CHANGE UP (ref 2–14)
NEUTROPHILS # BLD AUTO: 4.58 K/UL — SIGNIFICANT CHANGE UP (ref 1.8–7.4)
NEUTROPHILS NFR BLD AUTO: 75.5 % — SIGNIFICANT CHANGE UP (ref 43–77)
OVALOCYTES BLD QL SMEAR: SLIGHT — SIGNIFICANT CHANGE UP
PLAT MORPH BLD: NORMAL — SIGNIFICANT CHANGE UP
PLATELET # BLD AUTO: 159 K/UL — SIGNIFICANT CHANGE UP (ref 150–400)
POIKILOCYTOSIS BLD QL AUTO: SIGNIFICANT CHANGE UP
POTASSIUM SERPL-MCNC: 3.9 MMOL/L — SIGNIFICANT CHANGE UP (ref 3.5–5.3)
POTASSIUM SERPL-SCNC: 3.9 MMOL/L — SIGNIFICANT CHANGE UP (ref 3.5–5.3)
RBC # BLD: 3.45 M/UL — LOW (ref 4.2–5.8)
RBC # FLD: 23.2 % — HIGH (ref 10.3–14.5)
RBC BLD AUTO: ABNORMAL
SARS-COV-2 RNA SPEC QL NAA+PROBE: SIGNIFICANT CHANGE UP
SCHISTOCYTES BLD QL AUTO: SLIGHT — SIGNIFICANT CHANGE UP
SODIUM SERPL-SCNC: 142 MMOL/L — SIGNIFICANT CHANGE UP (ref 135–145)
VARIANT LYMPHS # BLD: 2.6 % — SIGNIFICANT CHANGE UP (ref 0–6)
WBC # BLD: 6.06 K/UL — SIGNIFICANT CHANGE UP (ref 3.8–10.5)
WBC # FLD AUTO: 6.06 K/UL — SIGNIFICANT CHANGE UP (ref 3.8–10.5)

## 2021-03-16 PROCEDURE — 99233 SBSQ HOSP IP/OBS HIGH 50: CPT

## 2021-03-16 PROCEDURE — 99232 SBSQ HOSP IP/OBS MODERATE 35: CPT

## 2021-03-16 RX ORDER — CLOPIDOGREL BISULFATE 75 MG/1
75 TABLET, FILM COATED ORAL DAILY
Refills: 0 | Status: DISCONTINUED | OUTPATIENT
Start: 2021-03-16 | End: 2021-03-25

## 2021-03-16 RX ORDER — HYDRALAZINE HCL 50 MG
50 TABLET ORAL EVERY 8 HOURS
Refills: 0 | Status: DISCONTINUED | OUTPATIENT
Start: 2021-03-16 | End: 2021-03-16

## 2021-03-16 RX ORDER — CHLORHEXIDINE GLUCONATE 213 G/1000ML
1 SOLUTION TOPICAL
Refills: 0 | Status: DISCONTINUED | OUTPATIENT
Start: 2021-03-16 | End: 2021-03-25

## 2021-03-16 RX ORDER — NIFEDIPINE 30 MG
60 TABLET, EXTENDED RELEASE 24 HR ORAL
Refills: 0 | Status: DISCONTINUED | OUTPATIENT
Start: 2021-03-16 | End: 2021-03-25

## 2021-03-16 RX ORDER — HYDRALAZINE HCL 50 MG
50 TABLET ORAL
Refills: 0 | Status: DISCONTINUED | OUTPATIENT
Start: 2021-03-16 | End: 2021-03-25

## 2021-03-16 RX ADMIN — GABAPENTIN 100 MILLIGRAM(S): 400 CAPSULE ORAL at 05:19

## 2021-03-16 RX ADMIN — PANTOPRAZOLE SODIUM 40 MILLIGRAM(S): 20 TABLET, DELAYED RELEASE ORAL at 22:25

## 2021-03-16 RX ADMIN — ATORVASTATIN CALCIUM 80 MILLIGRAM(S): 80 TABLET, FILM COATED ORAL at 22:13

## 2021-03-16 RX ADMIN — GABAPENTIN 100 MILLIGRAM(S): 400 CAPSULE ORAL at 17:45

## 2021-03-16 RX ADMIN — ISOSORBIDE MONONITRATE 30 MILLIGRAM(S): 60 TABLET, EXTENDED RELEASE ORAL at 13:22

## 2021-03-16 RX ADMIN — Medication 650 MILLIGRAM(S): at 10:16

## 2021-03-16 RX ADMIN — DULOXETINE HYDROCHLORIDE 60 MILLIGRAM(S): 30 CAPSULE, DELAYED RELEASE ORAL at 13:22

## 2021-03-16 RX ADMIN — Medication 60 MILLIGRAM(S): at 22:25

## 2021-03-16 RX ADMIN — Medication 50 MILLIGRAM(S): at 05:19

## 2021-03-16 RX ADMIN — Medication 90 MILLIGRAM(S): at 05:19

## 2021-03-16 RX ADMIN — PANTOPRAZOLE SODIUM 40 MILLIGRAM(S): 20 TABLET, DELAYED RELEASE ORAL at 09:47

## 2021-03-16 RX ADMIN — Medication 50 MILLIGRAM(S): at 13:23

## 2021-03-16 RX ADMIN — Medication 20 MILLIGRAM(S): at 13:22

## 2021-03-16 RX ADMIN — CLOPIDOGREL BISULFATE 75 MILLIGRAM(S): 75 TABLET, FILM COATED ORAL at 13:22

## 2021-03-16 RX ADMIN — Medication 50 MILLIGRAM(S): at 22:13

## 2021-03-16 RX ADMIN — Medication 1 TABLET(S): at 13:23

## 2021-03-16 RX ADMIN — Medication 650 MILLIGRAM(S): at 09:46

## 2021-03-16 NOTE — PROGRESS NOTE ADULT - ASSESSMENT
86 years old male with history of CAD, PAD s/p stent, Carotid Stenosis s/p CEA, HLD, HTN, HLD, HFpEF, ESRD on HD twice a week (M/Fri) via LUE fistula, s/p ILR placed 6/2020, AS s/p TAVR and Anemia requiring prior transfusions, presented with worsening weakness and dyspnea on exertion - found to have Hb of 5.6.     1) Acute Blood Loss Anemia  - Likely due to Upper GI Bleed  - History of gastric ulcer  - s/p EGD: Pt with possible esophageal candidiasis and brushings taken. 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 Duodenum was entered and found to be normal D1 and D2.  - Esophageal brushings negative for yeast cells.   - Resumed Plavix today   - NO NSAIDs  - Continue Protonix 40 mg BID x 8 weeks then OD  - s/p 4 units of PRBCs  - GI follow up noted     2) PAD  - s/p RLE angiogram with angioplasty June 2020 + s/p RLE angio/SFA angioplasty/peroneal angioplasty and pop stents x 3 October 20  - DANNA/PVRs with significant PAD   - Plavix resumed   - Continue Lipitor   - Vascular Surgery following: plan for angiogram tomorrow     3) CAD / Carotid Stenosis / HLD / HTN  - Plavix resumed   - Continue Lipitor, Imdur, Nifedipine and Hydralazine  - Cardiology following     4) Chronic Diastolic Heart Failure  - Continue Torsemide   - Cardiology following     5) ESRD   - On dialysis (Monday and Friday)  - s/p HD on 3/14, next HD tomorrow   - Renal following     DVT Prophylaxis -- SCDs    Dispo: Likely home once cleared by Vascular Surgery.

## 2021-03-16 NOTE — PROGRESS NOTE ADULT - SUBJECTIVE AND OBJECTIVE BOX
Chief Complaint: This is a 86y old man patient being seen in follow-up consultation for symptomatic anemia.    HPI / 24H events:  Patient underwent EGD yesterday with APC treatment of gastric AVMs.  Doing well today, tolerating diet.  Denies melena or rectal bleeding.  No abdominal pain.  No nausea or vomiting.    ROS: A 14-point review of systems was reviewed and was otherwise negative save what was reported in the HPI.    PAST MEDICAL/SURGICAL HISTORY:  GI bleeding  due to ulcerated polyps and colonic AVMs    Aortic stenosis  - s/p valve replacement    ESRD on dialysis  HD on Mondays and Fridays    Risk factors for obstructive sleep apnea    Anemia    RICHARDS (dyspnea on exertion)    VT (ventricular tachycardia)    HTN (hypertension)    CAD (coronary artery disease)    Arrhythmia    AV block, 1st degree    DM (diabetes mellitus)  - resolved    History of loop recorder    S/P TAVR (transcatheter aortic valve replacement)    H/O carotid endarterectomy  Right    A-V fistula  left arm 5/2017    H/O angioplasty  2013,  no  intervention    H/O left knee surgery    H/O circumcision  at  age  65      MEDICATIONS  (STANDING):  atorvastatin 80 milliGRAM(s) Oral at bedtime  chlorhexidine 4% Liquid 1 Application(s) Topical <User Schedule>  clopidogrel Tablet 75 milliGRAM(s) Oral daily  DULoxetine 60 milliGRAM(s) Oral daily  epoetin juan-epbx (RETACRIT) Injectable 02363 Unit(s) IV Push <User Schedule>  gabapentin 100 milliGRAM(s) Oral two times a day  hydrALAZINE 50 milliGRAM(s) Oral two times a day  isosorbide   mononitrate ER Tablet (IMDUR) 30 milliGRAM(s) Oral daily  Nephro-pito 1 Tablet(s) Oral daily  NIFEdipine XL 90 milliGRAM(s) Oral daily  NIFEdipine XL 60 milliGRAM(s) Oral <User Schedule>  pantoprazole  Injectable 40 milliGRAM(s) IV Push every 12 hours  torsemide 20 milliGRAM(s) Oral <User Schedule>    MEDICATIONS  (PRN):  acetaminophen   Tablet .. 650 milliGRAM(s) Oral every 6 hours PRN Temp greater or equal to 38C (100.4F), Mild Pain (1 - 3)    Plavix (Hives)  Toprol-XL (Rash)    T(C): 36.7 (03-16-21 @ 10:22), Max: 36.9 (03-15-21 @ 21:06)  HR: 63 (03-16-21 @ 13:22) (63 - 96)  BP: 142/53 (03-16-21 @ 13:22) (130/55 - 150/55)  RR: 18 (03-16-21 @ 10:22) (18 - 18)  SpO2: 98% (03-16-21 @ 10:22) (96% - 98%)    I&O's Summary    PHYSICAL EXAM:  Constitutional: Well-developed, well-nourished, in no apparent distress  Eyes: Sclerae anicteric, conjunctivae normal  ENMT: Mucus membranes moist, no oropharyngeal thrush noted  Neck: No thyroid nodules appreciated, no significant cervical or supraclavicular lymphadenopathy  Respiratory: Breathing nonlabored; clear to auscultation  Cardiovascular: Regular rate and rhythm  Gastrointestinal: Soft, nontender, nondistended, normoactive bowel sounds; no hepatosplenomegaly appreciated; no rebound tenderness or involuntary guarding  Extremities: No clubbing, cyanosis or edema  Neurological: Alert and oriented to person, place and time; no asterixis  Skin: No jaundice  Lymph Nodes: No significant lymphadenopathy  Musculoskeletal: No significant peripheral atrophy  Psychiatric: Affect and mood appropriate      LABS:               10.0   6.06  )-----------( 159      ( 03-16 @ 07:00 )             33.1                9.3    5.63  )-----------( 123      ( 03-15 @ 05:48 )             28.9                9.0    x     )-----------( x        ( 03-14 @ 20:07 )             27.4                7.1    5.87  )-----------( 124      ( 03-14 @ 09:43 )             22.5                5.6    6.60  )-----------( 153      ( 03-13 @ 18:31 )             19.0       03-16    142  |  99  |  67.0<H>  ----------------------------<  98  3.9   |  25.0  |  5.19<H>    Ca    9.1      16 Mar 2021 07:00  Mg     2.1     03-15

## 2021-03-16 NOTE — PROGRESS NOTE ADULT - ASSESSMENT
ESRD on HD- M/F schedule   Acute Blood Loss Anemia, UGIB; - s/p 4 units of PRBCs  Chronic CHFpEF   PAD- s/p RLE angiogram with angioplasty June 2020 + s/p RLE angio/SFA angioplasty/peroneal angioplasty and pop stents x 3 October 20; DANNA/PVRs with significant PAD    plan for angiogram tomorrow  Plan for HD after procedure  Hb stable; continue AMY with HD. Start IV iron.  BP stable, continue Torsemide,  Nifedipine and Hydralazine    d/w Dr Wright

## 2021-03-16 NOTE — PROGRESS NOTE ADULT - ASSESSMENT
Patient with GIB secondary to gastric AVMs.  Hemoglobin stable.  Continue pantoprazole 40 mg BID x 8 weeks, then daily thereafter.  OK to restart antiplatelet therapy.  GI will sign off.  Please reconsult as needed and call with any questions or concerns.

## 2021-03-16 NOTE — PROGRESS NOTE ADULT - ASSESSMENT
Assessment  UGIB sec to gastric AVM  S/p TAVR with normal gradient, normal EF, mod MR  PAF in SR with leadless Micra VVI PPM normal function  Stable CAD asx on med Rx  PAD s/p LE PCI awaiting repeat intervention  HTN      Rec  cont current meds  hold antiplatelet therapy as per GI  proceed with LE angio/poss intervention, cardiac status stable on medical therapy

## 2021-03-16 NOTE — PROGRESS NOTE ADULT - SUBJECTIVE AND OBJECTIVE BOX
CHRISTIANO ELIZABETH    [id number]    History:    Patient seen this am.  s/p egd yesterday with GI  Denies nausea, vomiting, chest pain, shortness of breath, abdominal pain or fever.   No new complaints. Still reporting Left Le pain at rest    Vital Signs Last 24 Hrs  T(C): 36.4 (16 Mar 2021 05:18), Max: 36.9 (15 Mar 2021 21:06)  T(F): 97.5 (16 Mar 2021 05:18), Max: 98.5 (15 Mar 2021 21:06)  HR: 70 (16 Mar 2021 05:18) (69 - 77)  BP: 150/55 (16 Mar 2021 05:18) (122/53 - 154/70)  BP(mean): --  RR: 18 (16 Mar 2021 05:18) (18 - 20)  SpO2: 96% (16 Mar 2021 05:18) (93% - 96%)  I&O's Summary                            9.3    5.63  )-----------( 123      ( 15 Mar 2021 05:48 )             28.9     03-15    140  |  98  |  59.0<H>  ----------------------------<  103<H>  3.5   |  27.0  |  3.82<H>    Ca    8.9      15 Mar 2021 05:48  Phos  6.6     03-14  Mg     2.1     03-15        MEDICATIONS  (STANDING):  atorvastatin 80 milliGRAM(s) Oral at bedtime  chlorhexidine 4% Liquid 1 Application(s) Topical <User Schedule>  DULoxetine 60 milliGRAM(s) Oral daily  epoetin juan-epbx (RETACRIT) Injectable 16635 Unit(s) IV Push <User Schedule>  gabapentin 100 milliGRAM(s) Oral two times a day  hydrALAZINE 50 milliGRAM(s) Oral two times a day  isosorbide   mononitrate ER Tablet (IMDUR) 30 milliGRAM(s) Oral daily  Nephro-pito 1 Tablet(s) Oral daily  NIFEdipine XL 90 milliGRAM(s) Oral daily  pantoprazole  Injectable 40 milliGRAM(s) IV Push every 12 hours  torsemide 20 milliGRAM(s) Oral <User Schedule>    MEDICATIONS  (PRN):  acetaminophen   Tablet .. 650 milliGRAM(s) Oral every 6 hours PRN Temp greater or equal to 38C (100.4F), Mild Pain (1 - 3)      Physical exam:     Alert, no distress  non labored breathing  abdomen is soft, non tender  no palpable pedal pulses  signal detected bilateral dp R>L     Primary  Assessment:  • Anemia improved  - lle arterial insufficiency     Plan:   • plavix to be restarted today  - angiogram planned for tomorrow am  CHRISTIANO ELIZABETH    [id number]    History:    Patient seen this am.  s/p egd yesterday with GI  Denies nausea, vomiting, chest pain, shortness of breath, abdominal pain or fever.   No new complaints. Still reporting Left Le pain at rest    Vital Signs Last 24 Hrs  T(C): 36.4 (16 Mar 2021 05:18), Max: 36.9 (15 Mar 2021 21:06)  T(F): 97.5 (16 Mar 2021 05:18), Max: 98.5 (15 Mar 2021 21:06)  HR: 70 (16 Mar 2021 05:18) (69 - 77)  BP: 150/55 (16 Mar 2021 05:18) (122/53 - 154/70)  BP(mean): --  RR: 18 (16 Mar 2021 05:18) (18 - 20)  SpO2: 96% (16 Mar 2021 05:18) (93% - 96%)  I&O's Summary                            9.3    5.63  )-----------( 123      ( 15 Mar 2021 05:48 )             28.9     03-15    140  |  98  |  59.0<H>  ----------------------------<  103<H>  3.5   |  27.0  |  3.82<H>    Ca    8.9      15 Mar 2021 05:48  Phos  6.6     03-14  Mg     2.1     03-15        MEDICATIONS  (STANDING):  atorvastatin 80 milliGRAM(s) Oral at bedtime  chlorhexidine 4% Liquid 1 Application(s) Topical <User Schedule>  DULoxetine 60 milliGRAM(s) Oral daily  epoetin juan-epbx (RETACRIT) Injectable 37358 Unit(s) IV Push <User Schedule>  gabapentin 100 milliGRAM(s) Oral two times a day  hydrALAZINE 50 milliGRAM(s) Oral two times a day  isosorbide   mononitrate ER Tablet (IMDUR) 30 milliGRAM(s) Oral daily  Nephro-pito 1 Tablet(s) Oral daily  NIFEdipine XL 90 milliGRAM(s) Oral daily  pantoprazole  Injectable 40 milliGRAM(s) IV Push every 12 hours  torsemide 20 milliGRAM(s) Oral <User Schedule>    MEDICATIONS  (PRN):  acetaminophen   Tablet .. 650 milliGRAM(s) Oral every 6 hours PRN Temp greater or equal to 38C (100.4F), Mild Pain (1 - 3)      Physical exam:     Alert, no distress  non labored breathing  abdomen is soft, non tender  no palpable pedal pulses  signal detected bilateral dp R>L     Primary  Assessment:  • Anemia improved  - lle arterial insufficiency     Plan:   • plavix may be restarted today (?? plavix allergy)  - angiogram planned for tomorrow am

## 2021-03-16 NOTE — PROGRESS NOTE ADULT - SUBJECTIVE AND OBJECTIVE BOX
[place] [facility] - [facility], [facility]                                   [address] [place] [address]                                                      PHONE: [phone]                                                         FAX: [phone]  [email or web address]  ---------------------------------------------------------------------------------------------------------------------------------    Overnight events/patient complaints: tele SR with VVI pacing Micra leadless PPM, OOB in chair asx, Hgb 10 post transfusion      Plavix (Hives)  Toprol-XL (Rash)    MEDICATIONS  (STANDING):  atorvastatin 80 milliGRAM(s) Oral at bedtime  chlorhexidine 4% Liquid 1 Application(s) Topical <User Schedule>  clopidogrel Tablet 75 milliGRAM(s) Oral daily  DULoxetine 60 milliGRAM(s) Oral daily  epoetin juan-epbx (RETACRIT) Injectable 17314 Unit(s) IV Push <User Schedule>  gabapentin 100 milliGRAM(s) Oral two times a day  hydrALAZINE 50 milliGRAM(s) Oral every 8 hours  isosorbide   mononitrate ER Tablet (IMDUR) 30 milliGRAM(s) Oral daily  Nephro-pito 1 Tablet(s) Oral daily  NIFEdipine XL 90 milliGRAM(s) Oral daily  pantoprazole  Injectable 40 milliGRAM(s) IV Push every 12 hours  torsemide 20 milliGRAM(s) Oral <User Schedule>    MEDICATIONS  (PRN):  acetaminophen   Tablet .. 650 milliGRAM(s) Oral every 6 hours PRN Temp greater or equal to 38C (100.4F), Mild Pain (1 - 3)      Vital Signs Last 24 Hrs  T(C): 36.4 (16 Mar 2021 05:18), Max: 36.9 (15 Mar 2021 21:06)  T(F): 97.5 (16 Mar 2021 05:18), Max: 98.5 (15 Mar 2021 21:06)  HR: 70 (16 Mar 2021 05:18) (69 - 77)  BP: 150/55 (16 Mar 2021 05:18) (122/53 - 150/55)  BP(mean): --  RR: 18 (16 Mar 2021 05:18) (18 - 18)  SpO2: 96% (16 Mar 2021 05:18) (93% - 96%)  Daily     Daily Weight in k.6 (16 Mar 2021 05:18)  ICU Vital Signs Last 24 Hrs  CHRISTIANO ELIZABETH  I&O's Detail    I&O's Summary    Drug Dosing Weight  CHRISTIANO ELIZABETH      PHYSICAL EXAM:  General: Appears well developed, well nourished alert and cooperative.  HEENT: Head; normocephalic, atraumatic.  Eyes: Pupils reactive, cornea wnl.  Neck: Supple, no nodes adenopathy, no NVD or carotid bruit or thyromegaly.  CARDIOVASCULAR: Normal S1 and S2, 2/6 soft systolic murmur   LUNGS: No rales, rhonchi or wheeze. Normal breath sounds bilaterally.  ABDOMEN: Soft, nontender without mass or organomegaly. bowel sounds normoactive.  EXTREMITIES: No clubbing, cyanosis or edema. Distal pulses wnl.   SKIN: warm and dry with normal turgor.  NEURO: Alert/oriented x 3/normal motor exam. No pathologic reflexes.    PSYCH: normal affect.        LABS:                        10.0   6.06  )-----------( 159      ( 16 Mar 2021 07:00 )             33.1     03-16    142  |  99  |  67.0<H>  ----------------------------<  98  3.9   |  25.0  |  5.19<H>    Ca    9.1      16 Mar 2021 07:00  Phos  6.6     03-14  Mg     2.1     03-15      CHRISTIANO ELIZABETH  CARDIAC MARKERS ( 14 Mar 2021 09:43 )  x     / 0.13 ng/mL / x     / x     / x                RADIOLOGY & ADDITIONAL STUDIES:    INTERPRETATION OF TELEMETRY (personally reviewed):    ECG:< from: 12 Lead ECG (21 @ 18:48) >  Diagnosis Line Ventricular-paced rhythm      Confirmed by Kevin Fuentes ([id number]) on 3/15/2021 8:55:25 PM    < end of copied text >      ECHO:< from: TTE Echo Complete w/o Contrast w/ Doppler (21 @ 11:36) >    Summary:   1. Moderately enlarged left atrium.   2. There is mild concentric left ventricular hypertrophy.   3. Left ventricular ejection fraction, by visual estimation, is 60 to 65%.   4. Grade II diastolic dysfunction. Elevated mean left atrial pressure.   5. Mildly enlarged right atrium.   6. Mildly enlarged right ventricle. Mildly reduced RV systolic function.   7. Mild mitral stenosis. Moderate mitral valve regurgitation. Elevated mean gradient likely due to volume overload. Repeat study after diuresis to erevaluate mitral valve. If clinically indicated.   8. 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 Well seated valve. Acceptable gradients. No regurgitation.   9. Mild tricuspid regurgitation.  10. Estimated pulmonary artery systolic pressure is 78 mmHg - severe pulmonary hypertension.  11. There is no evidence of pericardial effusion.    MD Rohith, RPVI Electronically signed on 3/14/2021 at 6:02:57 PM            *** Final ***        < end of copied text >      STRESS TEST:

## 2021-03-16 NOTE — PROGRESS NOTE ADULT - SUBJECTIVE AND OBJECTIVE BOX
Alcohol dependence with withdrawal    HPI:  87 y/o male with PMHx of HTN, HLD, CAD, HFpEF, s/p Right CEA for Carotid artery disease, ESRD on HD 2d/week (M/Fri) via LUE fistula, s/p flecainide w/ ILR placed 6/2020, AS s/p TAVR, PAD with RLE revasc on 10/2020 on Plavix, anemia requiring prior transfusions, following at Dr Varghese office for aricept, who presents to ED for worsening weakness for last week in setting of noted darkened stools. Daughter who is RN Manager at [employer] present at bedside to also provide collateral history. Patient has been having worsening left leg pain, extremity that was not stented as per patient, for which he has been taking 600mg of ibuprofen daily for many weeks now for pain control. Has also been on plavix for lower extremity stent. Per patient, earlier in the day was in kitchen using walker where he suddenly felt weak and "almost collapsed" but was helped by wife to get seated. Denies losing consciousness, syncopating or falling to floor. Endorses having dark stools over last few days. Has been having worsening dyspnea on exertion but denies any chest pain. Of note, daughter at bedside does say that he has been looking much more weaker now from baseline. Recently was seen by Dr. Baker in last week, was told his Hb was around 8 and was told it was stable.     In ED: noted occult positive, hb 5.6, 2 units prbcs ordered for transfusion, EKG noted: V Paced, rate 64, no evidence of acute ischemic changes. Admission called.  (13 Mar 2021 21:16)    Interval History:  Patient was seen and examined at bedside around 10 am.   Feeling better.   LLE pain is controlled. Denies paresthesia.   Denies chest pain, palpitations, shortness of breath, headache or dizziness.    No nausea, vomiting or abdominal pain. Appetite is good.     ROS:  As per interval history otherwise unremarkable.    PHYSICAL EXAM:  Vital Signs   T(C): 36.3 (16 Mar 2021 16:24), Max: 36.9 (15 Mar 2021 21:06)  T(F): 97.3 (16 Mar 2021 16:24), Max: 98.5 (15 Mar 2021 21:06)  HR: 67 (16 Mar 2021 16:24) (63 - 96)  BP: 150/65 (16 Mar 2021 16:24) (130/55 - 150/65)  RR: 18 (16 Mar 2021 16:24) (18 - 18)  SpO2: 100% (16 Mar 2021 16:24) (96% - 100%)  General: Elderly male lying in bed comfortably. No acute distress  HEENT: EOMI. Clear conjunctivae. Moist mucus membrane  Neck: Supple.   Chest: CTA bilaterally. No wheezing, rales or rhonchi.   Heart: Normal S1 & S2. RRR.   Abdomen: Soft. Non-tender. Non-distended. + BS  Ext: 2+ pedal edema. No calf tenderness   Neuro: Active and alert. No focal deficit. No speech disorder  Skin: Warm and Dry  Psychiatry: Normal mood and affect    MEDICATIONS  (STANDING):  atorvastatin 80 milliGRAM(s) Oral at bedtime  chlorhexidine 4% Liquid 1 Application(s) Topical <User Schedule>  clopidogrel Tablet 75 milliGRAM(s) Oral daily  DULoxetine 60 milliGRAM(s) Oral daily  epoetin juan-epbx (RETACRIT) Injectable 68688 Unit(s) IV Push <User Schedule>  gabapentin 100 milliGRAM(s) Oral two times a day  hydrALAZINE 50 milliGRAM(s) Oral two times a day  isosorbide   mononitrate ER Tablet (IMDUR) 30 milliGRAM(s) Oral daily  Nephro-pito 1 Tablet(s) Oral daily  NIFEdipine XL 90 milliGRAM(s) Oral daily  NIFEdipine XL 60 milliGRAM(s) Oral <User Schedule>  pantoprazole  Injectable 40 milliGRAM(s) IV Push every 12 hours  torsemide 20 milliGRAM(s) Oral <User Schedule>    MEDICATIONS  (PRN):  acetaminophen   Tablet .. 650 milliGRAM(s) Oral every 6 hours PRN Temp greater or equal to 38C (100.4F), Mild Pain (1 - 3)    LABS:                        10.0   6.06  )-----------( 159      ( 16 Mar 2021 07:00 )             33.1     03-16    142  |  99  |  67.0<H>  ----------------------------<  98  3.9   |  25.0  |  5.19<H>    Ca    9.1      16 Mar 2021 07:00  Mg     2.1     03-15    Blood Culture: 03-15 @ 10:59  Organism --  Gram Stain Blood -- Gram Stain --  Specimen Source .Smear Esophageal Brushings  Culture-Blood --    RADIOLOGY & ADDITIONAL STUDIES:  Reviewed

## 2021-03-16 NOTE — PROGRESS NOTE ADULT - SUBJECTIVE AND OBJECTIVE BOX
[facility] DIVISION OF KIDNEY DISEASES AND HYPERTENSION -- HEMODIALYSIS NOTE  --------------------------------------------------------------------------------  Chief Complaint: ESRD/Ongoing hemodialysis requirement    24 hour events/subjective:  Pt seen and examined  feels well        PAST HISTORY  --------------------------------------------------------------------------------  No significant changes to PMH, PSH, FHx, SHx, unless otherwise noted    ALLERGIES & MEDICATIONS  --------------------------------------------------------------------------------  Allergies    Plavix (Hives)  Toprol-XL (Rash)    Intolerances      Standing Inpatient Medications  atorvastatin 80 milliGRAM(s) Oral at bedtime  chlorhexidine 4% Liquid 1 Application(s) Topical <User Schedule>  clopidogrel Tablet 75 milliGRAM(s) Oral daily  DULoxetine 60 milliGRAM(s) Oral daily  epoetin juan-epbx (RETACRIT) Injectable 38909 Unit(s) IV Push <User Schedule>  gabapentin 100 milliGRAM(s) Oral two times a day  hydrALAZINE 50 milliGRAM(s) Oral two times a day  isosorbide   mononitrate ER Tablet (IMDUR) 30 milliGRAM(s) Oral daily  Nephro-pito 1 Tablet(s) Oral daily  NIFEdipine XL 90 milliGRAM(s) Oral daily  NIFEdipine XL 60 milliGRAM(s) Oral <User Schedule>  pantoprazole  Injectable 40 milliGRAM(s) IV Push every 12 hours  torsemide 20 milliGRAM(s) Oral <User Schedule>    PRN Inpatient Medications  acetaminophen   Tablet .. 650 milliGRAM(s) Oral every 6 hours PRN      REVIEW OF SYSTEMS  --------------------------------------------------------------------------------  Gen: No weight changes, fatigue, fevers/chills, weakness  Skin: No rashes  Head/Eyes/Ears/Mouth: No headache  Respiratory: No dyspnea, cough,  CV: No chest pain, orthopnea  GI: No abdominal pain, diarrhea, constipation, nausea, vomiting,  MSK: No joint pain  Neuro: No dizziness/lightheadedness, weakness  Heme: No bleeding  Psych: No significant nervousness, anxiety, stress, depression    All other systems were reviewed and are negative, except as noted.    VITALS/PHYSICAL EXAM  --------------------------------------------------------------------------------  T(C): 36.7 (03-16-21 @ 10:22), Max: 36.9 (03-15-21 @ 21:06)  HR: 63 (03-16-21 @ 13:22) (63 - 96)  BP: 142/53 (03-16-21 @ 13:22) (130/55 - 150/55)  RR: 18 (03-16-21 @ 10:22) (18 - 18)  SpO2: 98% (03-16-21 @ 10:22) (96% - 98%)  Wt(kg): --        Physical Exam:  	Gen: NAD, well-appearing  	HEENT: PERRL, supple neck,  	Pulm: CTA B/L  	CV: RRR, S1S2; no rub  	Abd: +BS, soft, nontender  	UE: Warm, intact strength; no asterixis  	LE: Warm, no edema  	Neuro: No focal deficits  	Psych: Normal affect and mood  	Skin: Warm, without rashes  	Vascular access: BARBRA TONEY    LABS/STUDIES  --------------------------------------------------------------------------------              10.0   6.06  >-----------<  159      [03-16-21 @ 07:00]              33.1     142  |  99  |  67.0  ----------------------------<  98      [03-16-21 @ 07:00]  3.9   |  25.0  |  5.19        Ca     9.1     [03-16-21 @ 07:00]      Mg     2.1     [03-15-21 @ 05:48]            Iron 53, TIBC 266, %sat 20      [08-19-20 @ 06:32]  Ferritin 184      [08-19-20 @ 06:32]  PTH -- (Ca 9.6)      [08-19-20 @ 16:08]   91  Vitamin D (25OH) 26.4      [08-19-20 @ 16:08]  HbA1c 4.9      [08-09-18 @ 05:54]  TSH 2.16      [09-15-20 @ 02:01]

## 2021-03-17 DIAGNOSIS — I73.9 PERIPHERAL VASCULAR DISEASE, UNSPECIFIED: ICD-10-CM

## 2021-03-17 LAB
ANION GAP SERPL CALC-SCNC: 19 MMOL/L — HIGH (ref 5–17)
ANISOCYTOSIS BLD QL: SLIGHT — SIGNIFICANT CHANGE UP
BASOPHILS # BLD AUTO: 0.06 K/UL — SIGNIFICANT CHANGE UP (ref 0–0.2)
BASOPHILS NFR BLD AUTO: 0.9 % — SIGNIFICANT CHANGE UP (ref 0–2)
BUN SERPL-MCNC: 82 MG/DL — HIGH (ref 8–20)
BURR CELLS BLD QL SMEAR: PRESENT — SIGNIFICANT CHANGE UP
CALCIUM SERPL-MCNC: 8.5 MG/DL — LOW (ref 8.6–10.2)
CHLORIDE SERPL-SCNC: 100 MMOL/L — SIGNIFICANT CHANGE UP (ref 98–107)
CO2 SERPL-SCNC: 22 MMOL/L — SIGNIFICANT CHANGE UP (ref 22–29)
CREAT SERPL-MCNC: 6.48 MG/DL — HIGH (ref 0.5–1.3)
ELLIPTOCYTES BLD QL SMEAR: SLIGHT — SIGNIFICANT CHANGE UP
EOSINOPHIL # BLD AUTO: 0.16 K/UL — SIGNIFICANT CHANGE UP (ref 0–0.5)
EOSINOPHIL NFR BLD AUTO: 2.6 % — SIGNIFICANT CHANGE UP (ref 0–6)
GIANT PLATELETS BLD QL SMEAR: PRESENT — SIGNIFICANT CHANGE UP
GLUCOSE SERPL-MCNC: 181 MG/DL — HIGH (ref 70–99)
HCT VFR BLD CALC: 32.8 % — LOW (ref 39–50)
HGB BLD-MCNC: 9.6 G/DL — LOW (ref 13–17)
LYMPHOCYTES # BLD AUTO: 0.38 K/UL — LOW (ref 1–3.3)
LYMPHOCYTES # BLD AUTO: 6.1 % — LOW (ref 13–44)
MACROCYTES BLD QL: SLIGHT — SIGNIFICANT CHANGE UP
MAGNESIUM SERPL-MCNC: 2.2 MG/DL — SIGNIFICANT CHANGE UP (ref 1.8–2.6)
MANUAL SMEAR VERIFICATION: SIGNIFICANT CHANGE UP
MCHC RBC-ENTMCNC: 27.8 PG — SIGNIFICANT CHANGE UP (ref 27–34)
MCHC RBC-ENTMCNC: 29.3 GM/DL — LOW (ref 32–36)
MCV RBC AUTO: 95.1 FL — SIGNIFICANT CHANGE UP (ref 80–100)
MICROCYTES BLD QL: SLIGHT — SIGNIFICANT CHANGE UP
MONOCYTES # BLD AUTO: 0.64 K/UL — SIGNIFICANT CHANGE UP (ref 0–0.9)
MONOCYTES NFR BLD AUTO: 10.4 % — SIGNIFICANT CHANGE UP (ref 2–14)
MYELOCYTES NFR BLD: 1.7 % — HIGH (ref 0–0)
NEUTROPHILS # BLD AUTO: 4.85 K/UL — SIGNIFICANT CHANGE UP (ref 1.8–7.4)
NEUTROPHILS NFR BLD AUTO: 78.3 % — HIGH (ref 43–77)
PLAT MORPH BLD: ABNORMAL
PLATELET # BLD AUTO: 157 K/UL — SIGNIFICANT CHANGE UP (ref 150–400)
POIKILOCYTOSIS BLD QL AUTO: SIGNIFICANT CHANGE UP
POLYCHROMASIA BLD QL SMEAR: SLIGHT — SIGNIFICANT CHANGE UP
POTASSIUM SERPL-MCNC: 3.9 MMOL/L — SIGNIFICANT CHANGE UP (ref 3.5–5.3)
POTASSIUM SERPL-SCNC: 3.9 MMOL/L — SIGNIFICANT CHANGE UP (ref 3.5–5.3)
RBC # BLD: 3.45 M/UL — LOW (ref 4.2–5.8)
RBC # FLD: 21.9 % — HIGH (ref 10.3–14.5)
RBC BLD AUTO: ABNORMAL
SMUDGE CELLS # BLD: PRESENT — SIGNIFICANT CHANGE UP
SODIUM SERPL-SCNC: 141 MMOL/L — SIGNIFICANT CHANGE UP (ref 135–145)
WBC # BLD: 6.2 K/UL — SIGNIFICANT CHANGE UP (ref 3.8–10.5)
WBC # FLD AUTO: 6.2 K/UL — SIGNIFICANT CHANGE UP (ref 3.8–10.5)

## 2021-03-17 PROCEDURE — 75630 X-RAY AORTA LEG ARTERIES: CPT | Mod: 26,59

## 2021-03-17 PROCEDURE — 76937 US GUIDE VASCULAR ACCESS: CPT | Mod: 26

## 2021-03-17 PROCEDURE — 90937 HEMODIALYSIS REPEATED EVAL: CPT

## 2021-03-17 PROCEDURE — 75710 ARTERY X-RAYS ARM/LEG: CPT | Mod: 26,LT

## 2021-03-17 PROCEDURE — 99233 SBSQ HOSP IP/OBS HIGH 50: CPT

## 2021-03-17 PROCEDURE — 36246 INS CATH ABD/L-EXT ART 2ND: CPT | Mod: LT

## 2021-03-17 RX ORDER — PANTOPRAZOLE SODIUM 20 MG/1
40 TABLET, DELAYED RELEASE ORAL EVERY 12 HOURS
Refills: 0 | Status: DISCONTINUED | OUTPATIENT
Start: 2021-03-17 | End: 2021-03-25

## 2021-03-17 RX ORDER — HEPARIN SODIUM 5000 [USP'U]/ML
5000 INJECTION INTRAVENOUS; SUBCUTANEOUS EVERY 8 HOURS
Refills: 0 | Status: DISCONTINUED | OUTPATIENT
Start: 2021-03-17 | End: 2021-03-23

## 2021-03-17 RX ORDER — HYDROMORPHONE HYDROCHLORIDE 2 MG/ML
0.5 INJECTION INTRAMUSCULAR; INTRAVENOUS; SUBCUTANEOUS ONCE
Refills: 0 | Status: DISCONTINUED | OUTPATIENT
Start: 2021-03-17 | End: 2021-03-18

## 2021-03-17 RX ADMIN — ISOSORBIDE MONONITRATE 30 MILLIGRAM(S): 60 TABLET, EXTENDED RELEASE ORAL at 13:16

## 2021-03-17 RX ADMIN — GABAPENTIN 100 MILLIGRAM(S): 400 CAPSULE ORAL at 05:52

## 2021-03-17 RX ADMIN — Medication 650 MILLIGRAM(S): at 13:20

## 2021-03-17 RX ADMIN — Medication 50 MILLIGRAM(S): at 19:16

## 2021-03-17 RX ADMIN — PANTOPRAZOLE SODIUM 40 MILLIGRAM(S): 20 TABLET, DELAYED RELEASE ORAL at 13:16

## 2021-03-17 RX ADMIN — Medication 1 TABLET(S): at 13:16

## 2021-03-17 RX ADMIN — Medication 650 MILLIGRAM(S): at 01:26

## 2021-03-17 RX ADMIN — DULOXETINE HYDROCHLORIDE 60 MILLIGRAM(S): 30 CAPSULE, DELAYED RELEASE ORAL at 13:16

## 2021-03-17 RX ADMIN — Medication 60 MILLIGRAM(S): at 21:44

## 2021-03-17 RX ADMIN — Medication 650 MILLIGRAM(S): at 22:15

## 2021-03-17 RX ADMIN — Medication 50 MILLIGRAM(S): at 05:52

## 2021-03-17 RX ADMIN — Medication 650 MILLIGRAM(S): at 00:56

## 2021-03-17 RX ADMIN — ERYTHROPOIETIN 12000 UNIT(S): 10000 INJECTION, SOLUTION INTRAVENOUS; SUBCUTANEOUS at 17:32

## 2021-03-17 RX ADMIN — Medication 20 MILLIGRAM(S): at 13:16

## 2021-03-17 RX ADMIN — Medication 90 MILLIGRAM(S): at 13:16

## 2021-03-17 RX ADMIN — PANTOPRAZOLE SODIUM 40 MILLIGRAM(S): 20 TABLET, DELAYED RELEASE ORAL at 21:47

## 2021-03-17 RX ADMIN — Medication 650 MILLIGRAM(S): at 21:45

## 2021-03-17 RX ADMIN — CHLORHEXIDINE GLUCONATE 1 APPLICATION(S): 213 SOLUTION TOPICAL at 05:48

## 2021-03-17 RX ADMIN — HEPARIN SODIUM 5000 UNIT(S): 5000 INJECTION INTRAVENOUS; SUBCUTANEOUS at 21:43

## 2021-03-17 RX ADMIN — ATORVASTATIN CALCIUM 80 MILLIGRAM(S): 80 TABLET, FILM COATED ORAL at 21:47

## 2021-03-17 RX ADMIN — CLOPIDOGREL BISULFATE 75 MILLIGRAM(S): 75 TABLET, FILM COATED ORAL at 13:16

## 2021-03-17 RX ADMIN — Medication 650 MILLIGRAM(S): at 19:02

## 2021-03-17 NOTE — PROGRESS NOTE ADULT - ASSESSMENT
85 y/o M with PMHx of HTN, HLD, ESRD on HD (LUE AVF), anemia of chronic disease, AS subsequent TAVR, pAF s/p leadless micra PPM (VVI), CAD,  arterial insufficiency, severe PAD (prior multiple revascularizations (most recent 10/2020 with [facility]);  presents today to the cath lab in anticipation of a left lower extremity angiogram with Dr. Albrecht due to persistent lower lower extremity rest pain which has become more progressive.

## 2021-03-17 NOTE — PROGRESS NOTE ADULT - NSHPATTENDINGPLANDISCUSS_GEN_ALL_CORE
Patient, Vascular Surgery, Renal and RN Patient, Patient's Daughter, Vascular Surgery, Renal and RN Statement Selected

## 2021-03-17 NOTE — PROGRESS NOTE ADULT - SUBJECTIVE AND OBJECTIVE BOX
Alcohol dependence with withdrawal    HPI:  87 y/o male with PMHx of HTN, HLD, CAD, HFpEF, s/p Right CEA for Carotid artery disease, ESRD on HD 2d/week (M/Fri) via LUE fistula, s/p flecainide w/ ILR placed 6/2020, AS s/p TAVR, PAD with RLE revasc on 10/2020 on Plavix, anemia requiring prior transfusions, following at Dr Varghese office for aricept, who presents to ED for worsening weakness for last week in setting of noted darkened stools. Daughter who is RN Manager at [employer] present at bedside to also provide collateral history. Patient has been having worsening left leg pain, extremity that was not stented as per patient, for which he has been taking 600mg of ibuprofen daily for many weeks now for pain control. Has also been on plavix for lower extremity stent. Per patient, earlier in the day was in kitchen using walker where he suddenly felt weak and "almost collapsed" but was helped by wife to get seated. Denies losing consciousness, syncopating or falling to floor. Endorses having dark stools over last few days. Has been having worsening dyspnea on exertion but denies any chest pain. Of note, daughter at bedside does say that he has been looking much more weaker now from baseline. Recently was seen by Dr. Baker in last week, was told his Hb was around 8 and was told it was stable.     In ED: noted occult positive, hb 5.6, 2 units prbcs ordered for transfusion, EKG noted: V Paced, rate 64, no evidence of acute ischemic changes. Admission called.  (13 Mar 2021 21:16)    Interval History:  Patient was seen and examined at bedside around 9:45 am in cath lab.    s/p LE Angiogram.   No pain in lower extremities at this time.   Was complaining of pain in left index finger earlier, now resolved.   Denies chest pain, palpitations, shortness of breath, headache or dizziness.      ROS:  As per interval history otherwise unremarkable.    PHYSICAL EXAM:  Vital Signs  T(C): 36.4 (17 Mar 2021 11:20), Max: 36.9 (16 Mar 2021 22:10)  T(F): 97.6 (17 Mar 2021 11:20), Max: 98.5 (16 Mar 2021 22:10)  HR: 73 (17 Mar 2021 11:20) (63 - 79)  BP: 168/63 (17 Mar 2021 11:20) (142/53 - 168/63)  RR: 18 (17 Mar 2021 11:20) (16 - 18)  SpO2: 93% (17 Mar 2021 11:20) (93% - 100%)  General: Elderly male lying in bed comfortably. No acute distress  HEENT: EOMI. Clear conjunctivae. Moist mucus membrane  Neck: Supple.   Chest: CTA bilaterally. No wheezing, rales or rhonchi.   Heart: Normal S1 & S2. RRR.   Abdomen: Soft. Non-tender. Non-distended. + BS  Ext: 1+ pedal edema. No calf tenderness   Neuro: Active and alert. No focal deficit. No speech disorder  Skin: Warm and Dry  Psychiatry: Normal mood and affect    MEDICATIONS  (STANDING):  atorvastatin 80 milliGRAM(s) Oral at bedtime  chlorhexidine 4% Liquid 1 Application(s) Topical <User Schedule>  clopidogrel Tablet 75 milliGRAM(s) Oral daily  DULoxetine 60 milliGRAM(s) Oral daily  epoetin juan-epbx (RETACRIT) Injectable 98553 Unit(s) IV Push <User Schedule>  gabapentin 100 milliGRAM(s) Oral two times a day  hydrALAZINE 50 milliGRAM(s) Oral two times a day  isosorbide   mononitrate ER Tablet (IMDUR) 30 milliGRAM(s) Oral daily  Nephro-pito 1 Tablet(s) Oral daily  NIFEdipine XL 90 milliGRAM(s) Oral daily  NIFEdipine XL 60 milliGRAM(s) Oral <User Schedule>  pantoprazole    Tablet 40 milliGRAM(s) Oral every 12 hours  torsemide 20 milliGRAM(s) Oral <User Schedule>    MEDICATIONS  (PRN):  acetaminophen   Tablet .. 650 milliGRAM(s) Oral every 6 hours PRN Temp greater or equal to 38C (100.4F), Mild Pain (1 - 3)    LABS:                        10.0   6.06  )-----------( 159      ( 16 Mar 2021 07:00 )             33.1     03-16    142  |  99  |  67.0<H>  ----------------------------<  98  3.9   |  25.0  |  5.19<H>    Ca    9.1      16 Mar 2021 07:00    Blood Culture: 03-15 @ 10:59  Organism --  Gram Stain Blood -- Gram Stain --  Specimen Source .Smear Esophageal Brushings  Culture-Blood --    RADIOLOGY & ADDITIONAL STUDIES:  Reviewed

## 2021-03-17 NOTE — PROGRESS NOTE ADULT - SUBJECTIVE AND OBJECTIVE BOX
Department of Cardiology                                                                  [facility]/[place] [address] [place] [address]                                                            Telephone: [phone]. Fax:[phone]    Pre- Procedure Note: Peripheral Angiogram of left lower extremity with Dr. Albrecht      Narrative:  87 y/o M with PMHx of HTN, HLD, ESRD on HD (LUE AVF), anemia of chronic disease, AS subsequent TAVR, pAF s/p leadless micra PPM (VVI), CAD,  arterial insufficiency, severe PAD (prior multiple revascularizations (most recent 10/2020 with LDS);  presents today to the cath lab in anticipation of a left lower extremity angiogram with Dr. Albrecht due to persistent lower lower extremity rest pain which has become more progressive.     ASA 4  MALL 2  CR 5.19  GFR 9  	  MEDICATIONS:  hydrALAZINE 50 milliGRAM(s) Oral two times a day  isosorbide   mononitrate ER Tablet (IMDUR) 30 milliGRAM(s) Oral daily  NIFEdipine XL 90 milliGRAM(s) Oral daily  NIFEdipine XL 60 milliGRAM(s) Oral <User Schedule>  torsemide 20 milliGRAM(s) Oral <User Schedule>  acetaminophen   Tablet .. 650 milliGRAM(s) Oral every 6 hours PRN  DULoxetine 60 milliGRAM(s) Oral daily  gabapentin 100 milliGRAM(s) Oral two times a day  pantoprazole    Tablet 40 milliGRAM(s) Oral every 12 hours  atorvastatin 80 milliGRAM(s) Oral at bedtime  chlorhexidine 4% Liquid 1 Application(s) Topical <User Schedule>  clopidogrel Tablet 75 milliGRAM(s) Oral daily  epoetin juan-epbx (RETACRIT) Injectable 85830 Unit(s) IV Push <User Schedule>  Nephro-pito 1 Tablet(s) Oral daily      ASA: 4  Mallampati: 2  Creatinine: 5.19  GFR: 9    PHYSICAL EXAM:  T(C): 36.8 (03-17-21 @ 07:17), Max: 36.9 (03-16-21 @ 22:10)  HR: 73 (03-17-21 @ 07:17) (63 - 96)  BP: 146/66 (03-17-21 @ 07:17) (130/55 - 158/57)  RR: 18 (03-17-21 @ 07:17) (18 - 18)  SpO2: 99% (03-17-21 @ 07:17) (95% - 100%)  Wt(kg): --    I&O's Summary    Daily Height in cm: 165.1 (17 Mar 2021 07:17)    Daily     Constitutional: A & O x 3  HEENT:   Normal oral mucosa, PERRL, EOMI	  Cardiovascular: Normal S1 S2, No JVD, No murmurs  Respiratory: Lungs clear to auscultation	  Gastrointestinal:  Soft, Non-tender, + BS	  Skin: No rashes, No ecchymoses, No cyanosis  Neurologic: Non-focal  Extremities: Normal range of motion, No clubbing, cyanosis, LUE AVF  Vascular: Peripheral pulses doppler 2+ bilaterally (not palpable)      [ ] Echocardiogram:  < from: TTE Echo Complete w/o Contrast w/ Doppler (03.14.21 @ 11:36) >  PHYSICIAN INTERPRETATION:  Left Ventricle: The left ventricular internal cavity size is normal.  Global LV systolic function was normal. Left ventricular ejection fraction, by visual estimation, is 60 to 65%. Spectral Doppler shows pseudonormal pattern of left ventricular myocardial filling (Grade II diastolic dysfunction). Elevated mean left atrial pressure.  Right Ventricle: The right ventricular size is mildly enlarged. RV systolic function is mildly reduced.  Left Atrium: Moderately enlarged left atrium.  Right Atrium: Mildly enlarged right atrium.  Pericardium: There is no evidence of pericardial effusion.  Mitral Valve: Moderate thickening and calcification of the anterior and posterior mitral valve leaflets. There is moderate mitral annular calcification. Mild mitral valve stenosis. Moderate mitral valve regurgitation is seen.  Tricuspid Valve: Mild tricuspid regurgitation is visualized. Estimated pulmonary artery systolic pressure is 78.2 mmHg assuming a right atrial pressure of 8 mmHg, which is consistent with severe pulmonary hypertension.  Aortic Valve: The aortic valve is trileaflet. Peak aortic valve gradient is 20.4 mmHg and the mean gradient is 11.0 mmHg, which is probably normal in the setting of a prosthetic aortic valve. 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 Well seated valve. Acceptable gradients. No regurgitation.  Pulmonic Valve: Trace pulmonic valve regurgitation.  Aorta: The aortic root is normal in size and structure.  Pulmonary Artery: The main pulmonary artery is normal in size.  Venous: The inferior vena cava was normal sized, with respiratory size variation less than 50%.      Summary:   1. Moderately enlarged left atrium.   2. There is mild concentric left ventricular hypertrophy.   3. Left ventricular ejection fraction, by visual estimation, is 60 to 65%.   4. Grade II diastolic dysfunction. Elevated mean left atrial pressure.   5. Mildly enlarged right atrium.   6. Mildly enlarged right ventricle. Mildly reduced RV systolic function.   7. Mild mitral stenosis. Moderate mitral valve regurgitation. Elevated mean gradient likely due to volume overload. Repeat study after diuresis to erevaluate mitral valve. If clinically indicated.   8. 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 Well seated valve. Acceptable gradients. No regurgitation.   9. Mild tricuspid regurgitation.  10. Estimated pulmonary artery systolic pressure is 78 mmHg - severe pulmonary hypertension.  11. There is no evidence of pericardial effusion.    [ ]  Catheterization:  < from: Cardiac Cath Lab - Adult (10.22.20 @ 07:49) >  PROCEDURE:  --  [id number]-pbynwcnwd angiography.  --  Right leg angiography.  --  Sonosite.  --  Sheath Exchange for Intervention.  --  Art Access- Right Posterior Tibial.  --  Peripheral Stent.  --  Peripheral PTA.  --  Failed Hemostasis with Mynx-Int.  --  Intervention on right peroneal: balloon angioplasty.  --  Intervention on right superficial femoral: balloon angioplasty.  TECHNIQUE: Cardiac catheterization performed electively.  Local anesthetic given. Left femoral artery access. Dpxbm-[id number]-aghxzgfrg  angiography. A catheter was positioned. Right leg angiography. A catheter  was positioned. Sonosite. RADIATION EXPOSURE: 60 min. A balloon  angioplasty was performed on the 100 % lesion in the right peroneal  artery. Following intervention there was a 1 % residual stenosis. There  was no dissection. Balloon angioplasty was performed, using a 3.0 X 60 X  135 ASTRID MONORAIL balloon, with 1 inflations and a maximum inflation  pressure of 10 mariusz. Balloon angioplasty was performed, using a ASTRID  OTW 5.0MM X 200MM X 150CM balloon, with 1 inflations and a maximum  inflation pressure of 10 mariusz. Balloon angioplasty was performed,using a  3.0 X 60 X 135 ASTRID MONORAIL balloon, with 1 inflations and a maximum  inflation pressure of 10 mariusz. Balloon angioplasty was performed, using a  ASTRID OTW 4.0MM X 80MM X 135CM balloon, with 2 inflations and a maximum  inflation pressure of 10 mariusz. A SUPERA STENT 4.5MM X 40MM X 120CM stent  was placed across the lesion and deployed. During the procedure, a new  V-18 CONTROL WIRE 200CM wire was advanced across the lesion. A SUPERA  STENT 4.5MM X 80MM X 120CM stent was placed across the lesion and  deployed. A balloon angioplasty was performed on the 90 % lesion in the  right superficial femoral artery. Following intervention there was a 1 %  residual stenosis. There was no dissection. Balloon angioplasty was  performed, using a ASTRID OTW 4.0MM X 80MM X 135CM balloon, with 3  inflations and a maximum inflation pressure of 10 mariusz. Balloon angioplasty  was performed, using a LUTONIX 5.0MM X 150MM X 130CM balloon, with 2  inflations and a maximum inflation pressure of 10 mariusz.A YANDY OTW 6MM X  100MM X 130CM stent was placed across the lesion and deployed. Balloon  angioplasty was performed, using a ASTRID OTW 6.0MM X 100MM X 135CM  balloon, with 2 inflations and a maximum inflation pressure of 10 mariusz.  Sheath Exchangefor Intervention. Art Access- Right Posterior Tibial.  Peripheral Stent. Peripheral PTA. Failed Hemostasis with Mynx-Int.  CONTRAST GIVEN: Visipaque 40 ml. Visipaque 60 ml.  MEDICATIONS GIVEN: Midazolam, 1 mg, IV. Fentanyl, 25 mcg, IV. Midazolam,  0.5 mg, IV. Fentanyl, 25 mcg, IV. Midazolam, 0.5 mg, IV. Fentanyl, 25 mcg,  IV. Fentanyl, 25 mcg, IV. Midazolam, 0.5 mg, IV. Fentanyl, 25 mcg, IV.  Midazolam, 0.5 mg, IV. Fentanyl, 25 mcg, IV. Midazolam, 0.5 mg, IV.  Fentanyl, 25 mcg, IV. Midazolam, 0.5mg, IV. Fentanyl, 25 mcg, IV.  Heparin, 3000 units, IV. Heparin, 3000 units, IV. Heparin, 2000 units, IV.  1% Lidocaine, 10 ml, subcutaneously. 1% Lidocaine, 10 ml, subcutaneously.  RIGHT LOWER EXTREMITY VESSELS: Right superficial femoral: There was a 90 %  stenosis. Right peroneal: There was a 100 % stenosis.  COMPLICATIONS: No complications occurred during the cath lab visit.  Prepared and signed by  Siva Albrecht MD        LABS:	                           10.0   6.06  )-----------( 159      ( 16 Mar 2021 07:00 )             33.1     03-16    142  |  99  |  67.0<H>  ----------------------------<  98  3.9   |  25.0  |  5.19<H>    Ca    9.1      16 Mar 2021 07:00

## 2021-03-17 NOTE — PROGRESS NOTE ADULT - ASSESSMENT
86 years old male with history of CAD, PAD s/p stent, Carotid Stenosis s/p CEA, HLD, HTN, HLD, HFpEF, ESRD on HD twice a week (M/Fri) via LUE fistula, s/p ILR placed 6/2020, AS s/p TAVR and Anemia requiring prior transfusions, presented with worsening weakness and dyspnea on exertion - found to have Hb of 5.6.     1) Acute Blood Loss Anemia  - Likely due to Upper GI Bleed  - History of gastric ulcer  - s/p EGD: Pt with possible esophageal candidiasis and brushings taken. 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 Duodenum was entered and found to be normal D1 and D2.  - Esophageal brushings negative for yeast cells.   - Resumed Plavix on 3/16  - NO NSAIDs  - Continue Protonix 40 mg BID x 8 weeks then OD  - s/p 4 units of PRBCs  - GI follow up noted     2) PAD  - s/p RLE angiogram with angioplasty June 2020 + s/p RLE angio/SFA angioplasty/peroneal angioplasty and pop stents x 3 October 20  - DANNA/PVRs with significant PAD   - s/p LE Angiogram: Occlusion of distal Right SFA with collateralization and patent AT. Unable to cross R SFA lesion.  - Plan for bypass as per vascular surgery    - Continue Lipitor and Plavix     3) CAD / Carotid Stenosis / HLD / HTN  - Continue Plavix, Lipitor, Imdur, Nifedipine and Hydralazine  - Cardiology following     4) Chronic Diastolic Heart Failure  - Continue Torsemide   - Cardiology following     5) ESRD   - On dialysis (Monday and Friday)  - s/p HD on 3/14, next HD today   - Renal following     DVT Prophylaxis -- Heparin SQ    Dispo: Likely home once cleared by Vascular Surgery.          86 years old male with history of CAD, PAD s/p stent, Carotid Stenosis s/p CEA, HLD, HTN, HLD, HFpEF, ESRD on HD twice a week (M/Fri) via LUE fistula, s/p ILR placed 6/2020, AS s/p TAVR and Anemia requiring prior transfusions, presented with worsening weakness and dyspnea on exertion - found to have Hb of 5.6.     1) Acute Blood Loss Anemia  - Likely due to Upper GI Bleed  - History of gastric ulcer  - s/p EGD: Pt with possible esophageal candidiasis and brushings taken. 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 Duodenum was entered and found to be normal D1 and D2.  - Esophageal brushings negative for yeast cells.   - Resumed Plavix on 3/16  - NO NSAIDs  - Continue Protonix 40 mg BID x 8 weeks then OD  - s/p 4 units of PRBCs  - GI signed off     2) PAD  - s/p RLE angiogram with angioplasty June 2020 + s/p RLE angio/SFA angioplasty/peroneal angioplasty and pop stents x 3 October 20  - DANNA/PVRs with significant PAD   - s/p LE Angiogram: Occlusion of distal Right SFA with collateralization and patent AT. Unable to cross R SFA lesion.  - Plan for bypass as per vascular surgery    - Patient is medically optimized for proposed procedure. No absolute contraindications.   - Continue Lipitor and Plavix     3) CAD / Carotid Stenosis / HLD / HTN  - Continue Plavix, Lipitor, Imdur, Nifedipine and Hydralazine  - Cardiology following     4) Chronic Diastolic Heart Failure  - Continue Torsemide   - Cardiology following     5) ESRD   - On dialysis (Monday and Friday)  - s/p HD on 3/14, next HD today   - Renal following     DVT Prophylaxis -- Heparin SQ    Dispo: Likely home once cleared by Vascular Surgery.     Patient is being transferred to Vascular Surgery.   Medicine team will no longer follow.   Please call if you have any questions.   Please prescribe Protonix 40 mg BID for 8 weeks on discharge.

## 2021-03-17 NOTE — PROGRESS NOTE ADULT - SUBJECTIVE AND OBJECTIVE BOX
INTERVAL HPI/OVERNIGHT EVENTS:    SUBJECTIVE: Patient evaluated at bedside, found resting comfortably in bed, nad. Continued LLE pain, controlled with oral medications at present. Plan for angiogram this am to evaluate for arterial disease. Denies sob, chest pain, n/v, f/c.       MEDICATIONS  (STANDING):  atorvastatin 80 milliGRAM(s) Oral at bedtime  chlorhexidine 4% Liquid 1 Application(s) Topical <User Schedule>  clopidogrel Tablet 75 milliGRAM(s) Oral daily  DULoxetine 60 milliGRAM(s) Oral daily  epoetin juan-epbx (RETACRIT) Injectable 29789 Unit(s) IV Push <User Schedule>  gabapentin 100 milliGRAM(s) Oral two times a day  hydrALAZINE 50 milliGRAM(s) Oral two times a day  isosorbide   mononitrate ER Tablet (IMDUR) 30 milliGRAM(s) Oral daily  Nephro-ptio 1 Tablet(s) Oral daily  NIFEdipine XL 90 milliGRAM(s) Oral daily  NIFEdipine XL 60 milliGRAM(s) Oral <User Schedule>  pantoprazole    Tablet 40 milliGRAM(s) Oral every 12 hours  torsemide 20 milliGRAM(s) Oral <User Schedule>    MEDICATIONS  (PRN):  acetaminophen   Tablet .. 650 milliGRAM(s) Oral every 6 hours PRN Temp greater or equal to 38C (100.4F), Mild Pain (1 - 3)      Vital Signs Last 24 Hrs  T(C): 36.8 (17 Mar 2021 07:17), Max: 36.9 (16 Mar 2021 22:10)  T(F): 98.3 (17 Mar 2021 07:17), Max: 98.5 (16 Mar 2021 22:10)  HR: 73 (17 Mar 2021 07:17) (63 - 96)  BP: 146/66 (17 Mar 2021 07:17) (130/55 - 158/57)  BP(mean): --  RR: 18 (17 Mar 2021 07:17) (18 - 18)  SpO2: 99% (17 Mar 2021 07:17) (95% - 100%)    PE  Gen: resting comfortably in bed, nad  Pulm: no increased wob, no conversational dyspnea  Abd: non-distended, soft, non-tender  Ext: distal extremities warm and well-perfused, no peripheral edema, L DP biphasic with doppler        I&O's Detail      LABS:                        10.0   6.06  )-----------( 159      ( 16 Mar 2021 07:00 )             33.1     03-16    142  |  99  |  67.0<H>  ----------------------------<  98  3.9   |  25.0  |  5.19<H>    Ca    9.1      16 Mar 2021 07:00            RADIOLOGY & ADDITIONAL STUDIES:

## 2021-03-17 NOTE — PROGRESS NOTE ADULT - SUBJECTIVE AND OBJECTIVE BOX
[facility] DIVISION OF KIDNEY DISEASES AND HYPERTENSION -- HEMODIALYSIS NOTE  --------------------------------------------------------------------------------  Chief Complaint: ESRD/Ongoing hemodialysis requirement    24 hour events/subjective:        PAST HISTORY  --------------------------------------------------------------------------------  No significant changes to PMH, PSH, FHx, SHx, unless otherwise noted    ALLERGIES & MEDICATIONS  --------------------------------------------------------------------------------  Allergies    Plavix (Hives)  Toprol-XL (Rash)    Intolerances      Standing Inpatient Medications  atorvastatin 80 milliGRAM(s) Oral at bedtime  chlorhexidine 4% Liquid 1 Application(s) Topical <User Schedule>  clopidogrel Tablet 75 milliGRAM(s) Oral daily  DULoxetine 60 milliGRAM(s) Oral daily  epoetin juan-epbx (RETACRIT) Injectable 27537 Unit(s) IV Push <User Schedule>  gabapentin 100 milliGRAM(s) Oral two times a day  heparin   Injectable 5000 Unit(s) SubCutaneous every 8 hours  hydrALAZINE 50 milliGRAM(s) Oral two times a day  HYDROmorphone  Injectable 0.5 milliGRAM(s) IV Push once  isosorbide   mononitrate ER Tablet (IMDUR) 30 milliGRAM(s) Oral daily  Nephro-pito 1 Tablet(s) Oral daily  NIFEdipine XL 90 milliGRAM(s) Oral daily  NIFEdipine XL 60 milliGRAM(s) Oral <User Schedule>  pantoprazole    Tablet 40 milliGRAM(s) Oral every 12 hours  torsemide 20 milliGRAM(s) Oral <User Schedule>    PRN Inpatient Medications  acetaminophen   Tablet .. 650 milliGRAM(s) Oral every 6 hours PRN      REVIEW OF SYSTEMS  --------------------------------------------------------------------------------  Gen: No weight changes, fatigue, fevers/chills, weakness  Skin: No rashes  Head/Eyes/Ears/Mouth: No headache; Normal hearing; Normal vision w/o blurriness; No sinus pain/discomfort, sore throat  Respiratory: No dyspnea, cough, wheezing, hemoptysis  CV: No chest pain, PND, orthopnea  GI: No abdominal pain, diarrhea, constipation, nausea, vomiting, melena, hematochezia  : No increased frequency, dysuria, hematuria, nocturia  MSK: No joint pain/swelling; no back pain; no edema  Neuro: No dizziness/lightheadedness, weakness, seizures, numbness, tingling  Heme: No easy bruising or bleeding  Endo: No heat/cold intolerance  Psych: No significant nervousness, anxiety, stress, depression    All other systems were reviewed and are negative, except as noted.    VITALS/PHYSICAL EXAM  --------------------------------------------------------------------------------  T(C): 36.9 (03-18-21 @ 05:33), Max: 36.9 (03-18-21 @ 05:33)  HR: 76 (03-18-21 @ 05:33) (64 - 76)  BP: 133/52 (03-18-21 @ 05:33) (133/52 - 168/63)  RR: 16 (03-18-21 @ 05:33) (16 - 18)  SpO2: 98% (03-18-21 @ 05:33) (93% - 100%)  Wt(kg): --  Height (cm): 165.1 (03-17-21 @ 07:17)  Weight (kg): 70.3 (03-17-21 @ 07:17)  BMI (kg/m2): 25.8 (03-17-21 @ 07:17)  BSA (m2): 1.77 (03-17-21 @ 07:17)      03-17-21 @ 07:01  -  03-18-21 @ 06:54  --------------------------------------------------------  IN: 800 mL / OUT: 1300 mL / NET: -500 mL      Physical Exam:  	Gen: NAD, well-appearing  	HEENT: PERRL, supple neck, clear oropharynx  	Pulm: CTA B/L  	CV: RRR, S1S2; no rub  	Back: No spinal or CVA tenderness; no sacral edema  	Abd: +BS, soft, nontender/nondistended  	: No suprapubic tenderness  	UE: Warm, FROM, no clubbing, intact strength; no edema; no asterixis  	LE: Warm, FROM, no clubbing, intact strength; no edema  	Neuro: No focal deficits, intact gait  	Psych: Normal affect and mood  	Skin: Warm, without rashes  	Vascular access:    LABS/STUDIES  --------------------------------------------------------------------------------              9.6    6.20  >-----------<  157      [03-17-21 @ 16:57]              32.8     141  |  100  |  82.0  ----------------------------<  181      [03-17-21 @ 16:57]  3.9   |  22.0  |  6.48        Ca     8.5     [03-17-21 @ 16:57]      Mg     2.2     [03-17-21 @ 16:57]            Iron 53, TIBC 266, %sat 20      [08-19-20 @ 06:32]  Ferritin 184      [08-19-20 @ 06:32]  PTH -- (Ca 9.6)      [08-19-20 @ 16:08]   91  Vitamin D (25OH) 26.4      [08-19-20 @ 16:08]  HbA1c 4.9      [08-09-18 @ 05:54]  TSH 2.16      [09-15-20 @ 02:01]

## 2021-03-17 NOTE — PROGRESS NOTE ADULT - SUBJECTIVE AND OBJECTIVE BOX
Nurse Practitioner Progress note:     INTERVAL HISTORY: 87 y/o M with PMHx of HTN, HLD, ESRD on HD (LUE AVF), anemia of chronic disease, AS subsequent TAVR, pAF s/p leadless micra PPM (VVI), CAD,  arterial insufficiency, severe PAD (prior multiple revascularizations (most recent 10/2020 with LDS);  presents today to the cath lab in anticipation of a left lower extremity angiogram with Dr. Albrecht due to persistent lower lower extremity rest pain which has become more progressive.       MEDICATIONS:  hydrALAZINE 50 milliGRAM(s) Oral two times a day  isosorbide   mononitrate ER Tablet (IMDUR) 30 milliGRAM(s) Oral daily  NIFEdipine XL 90 milliGRAM(s) Oral daily  NIFEdipine XL 60 milliGRAM(s) Oral <User Schedule>  torsemide 20 milliGRAM(s) Oral <User Schedule>  acetaminophen   Tablet .. 650 milliGRAM(s) Oral every 6 hours PRN  DULoxetine 60 milliGRAM(s) Oral daily  gabapentin 100 milliGRAM(s) Oral two times a day  pantoprazole    Tablet 40 milliGRAM(s) Oral every 12 hours  atorvastatin 80 milliGRAM(s) Oral at bedtime  chlorhexidine 4% Liquid 1 Application(s) Topical <User Schedule>  clopidogrel Tablet 75 milliGRAM(s) Oral daily  epoetin juan-epbx (RETACRIT) Injectable 47959 Unit(s) IV Push <User Schedule>  Nephro-ptio 1 Tablet(s) Oral daily      TELEMETRY: Afib 69 bpm    T(C): 36.8 (03-17-21 @ 07:17), Max: 36.9 (03-16-21 @ 22:10)  HR: 73 (03-17-21 @ 07:17) (63 - 96)  BP: 146/66 (03-17-21 @ 07:17) (130/55 - 158/57)  RR: 18 (03-17-21 @ 07:17) (18 - 18)  SpO2: 99% (03-17-21 @ 07:17) (95% - 100%)  Wt(kg): --    PHYSICAL EXAM:  Appearance: Normal	  Cardiovascular: Irreg/irreg, No JVD, No murmurs, No edema  Respiratory: Lungs clear to auscultation	  Neurologic: Non-focal, A&O X3.  No neuro deficits  Procedure Site: Right groin with mynx closure device bening.  No bleeding/hematoma/ecchymosis.  + doppler pulses      MEDICATIONS DURING PROCEDURE:  versed 1 mg  fentanyl 50 mcg  heparin 4000 u  visipaque 40 ml      PROCEDURE RESULTS: Unsuccessful LLE angio (full report to follow).  Possible surgical intervention    ASSESSMENT/PLAN: 	  -Transfer back to bed  -Groin precautions  -Bedrest X 1h  -Resume home meds  -Further orders per primary and vascular services

## 2021-03-17 NOTE — PROGRESS NOTE ADULT - PROBLEM SELECTOR PLAN 1
-Consent obtained per Dr. Albrecht  -Peripheral angiogram to be performed; procedure discussed with patient, risks and benefits explained, questions answered  -Vascular following during hospitalization  -Will discuss med changes/antiplatelet therapy thereafter; GI ?  -Vascular following   -Primary mgmt per Hospitalist

## 2021-03-17 NOTE — PROGRESS NOTE ADULT - ASSESSMENT
ASSESSMENT: Patient is a 86y old m with severe anemia presenting with rest pain of LLE.    PLAN:    - plan for angiogram with possible intervention this am  - cleared from cardiology for angiogram   - NPO  - rest of care per primary team  - vascular surgery to continue to follow

## 2021-03-18 LAB
ANION GAP SERPL CALC-SCNC: 14 MMOL/L — SIGNIFICANT CHANGE UP (ref 5–17)
BASOPHILS # BLD AUTO: 0.06 K/UL — SIGNIFICANT CHANGE UP (ref 0–0.2)
BASOPHILS NFR BLD AUTO: 0.9 % — SIGNIFICANT CHANGE UP (ref 0–2)
BUN SERPL-MCNC: 40 MG/DL — HIGH (ref 8–20)
CALCIUM SERPL-MCNC: 8.5 MG/DL — LOW (ref 8.6–10.2)
CHLORIDE SERPL-SCNC: 97 MMOL/L — LOW (ref 98–107)
CO2 SERPL-SCNC: 27 MMOL/L — SIGNIFICANT CHANGE UP (ref 22–29)
CREAT SERPL-MCNC: 4.07 MG/DL — HIGH (ref 0.5–1.3)
EOSINOPHIL # BLD AUTO: 0.07 K/UL — SIGNIFICANT CHANGE UP (ref 0–0.5)
EOSINOPHIL NFR BLD AUTO: 1.1 % — SIGNIFICANT CHANGE UP (ref 0–6)
GLUCOSE SERPL-MCNC: 157 MG/DL — HIGH (ref 70–99)
HCT VFR BLD CALC: 32.5 % — LOW (ref 39–50)
HGB BLD-MCNC: 9.6 G/DL — LOW (ref 13–17)
IMM GRANULOCYTES NFR BLD AUTO: 0.3 % — SIGNIFICANT CHANGE UP (ref 0–1.5)
LYMPHOCYTES # BLD AUTO: 0.38 K/UL — LOW (ref 1–3.3)
LYMPHOCYTES # BLD AUTO: 5.9 % — LOW (ref 13–44)
MAGNESIUM SERPL-MCNC: 2 MG/DL — SIGNIFICANT CHANGE UP (ref 1.6–2.6)
MCHC RBC-ENTMCNC: 28 PG — SIGNIFICANT CHANGE UP (ref 27–34)
MCHC RBC-ENTMCNC: 29.5 GM/DL — LOW (ref 32–36)
MCV RBC AUTO: 94.8 FL — SIGNIFICANT CHANGE UP (ref 80–100)
MONOCYTES # BLD AUTO: 0.5 K/UL — SIGNIFICANT CHANGE UP (ref 0–0.9)
MONOCYTES NFR BLD AUTO: 7.8 % — SIGNIFICANT CHANGE UP (ref 2–14)
NEUTROPHILS # BLD AUTO: 5.4 K/UL — SIGNIFICANT CHANGE UP (ref 1.8–7.4)
NEUTROPHILS NFR BLD AUTO: 84 % — HIGH (ref 43–77)
PHOSPHATE SERPL-MCNC: 3.6 MG/DL — SIGNIFICANT CHANGE UP (ref 2.4–4.7)
PLATELET # BLD AUTO: 159 K/UL — SIGNIFICANT CHANGE UP (ref 150–400)
POTASSIUM SERPL-MCNC: 3.6 MMOL/L — SIGNIFICANT CHANGE UP (ref 3.5–5.3)
POTASSIUM SERPL-SCNC: 3.6 MMOL/L — SIGNIFICANT CHANGE UP (ref 3.5–5.3)
RBC # BLD: 3.43 M/UL — LOW (ref 4.2–5.8)
RBC # FLD: 21.2 % — HIGH (ref 10.3–14.5)
SODIUM SERPL-SCNC: 137 MMOL/L — SIGNIFICANT CHANGE UP (ref 135–145)
WBC # BLD: 6.43 K/UL — SIGNIFICANT CHANGE UP (ref 3.8–10.5)
WBC # FLD AUTO: 6.43 K/UL — SIGNIFICANT CHANGE UP (ref 3.8–10.5)

## 2021-03-18 PROCEDURE — 99233 SBSQ HOSP IP/OBS HIGH 50: CPT

## 2021-03-18 RX ORDER — ACETAMINOPHEN 500 MG
975 TABLET ORAL EVERY 6 HOURS
Refills: 0 | Status: DISCONTINUED | OUTPATIENT
Start: 2021-03-18 | End: 2021-03-25

## 2021-03-18 RX ORDER — TRAMADOL HYDROCHLORIDE 50 MG/1
25 TABLET ORAL EVERY 4 HOURS
Refills: 0 | Status: DISCONTINUED | OUTPATIENT
Start: 2021-03-18 | End: 2021-03-20

## 2021-03-18 RX ADMIN — HYDROMORPHONE HYDROCHLORIDE 0.5 MILLIGRAM(S): 2 INJECTION INTRAMUSCULAR; INTRAVENOUS; SUBCUTANEOUS at 11:19

## 2021-03-18 RX ADMIN — Medication 975 MILLIGRAM(S): at 23:58

## 2021-03-18 RX ADMIN — Medication 20 MILLIGRAM(S): at 11:24

## 2021-03-18 RX ADMIN — Medication 975 MILLIGRAM(S): at 18:09

## 2021-03-18 RX ADMIN — TRAMADOL HYDROCHLORIDE 25 MILLIGRAM(S): 50 TABLET ORAL at 23:10

## 2021-03-18 RX ADMIN — HYDROMORPHONE HYDROCHLORIDE 0.5 MILLIGRAM(S): 2 INJECTION INTRAMUSCULAR; INTRAVENOUS; SUBCUTANEOUS at 08:48

## 2021-03-18 RX ADMIN — Medication 975 MILLIGRAM(S): at 19:10

## 2021-03-18 RX ADMIN — HEPARIN SODIUM 5000 UNIT(S): 5000 INJECTION INTRAVENOUS; SUBCUTANEOUS at 18:10

## 2021-03-18 RX ADMIN — Medication 975 MILLIGRAM(S): at 23:57

## 2021-03-18 RX ADMIN — CLOPIDOGREL BISULFATE 75 MILLIGRAM(S): 75 TABLET, FILM COATED ORAL at 11:24

## 2021-03-18 RX ADMIN — ISOSORBIDE MONONITRATE 30 MILLIGRAM(S): 60 TABLET, EXTENDED RELEASE ORAL at 11:24

## 2021-03-18 RX ADMIN — PANTOPRAZOLE SODIUM 40 MILLIGRAM(S): 20 TABLET, DELAYED RELEASE ORAL at 22:09

## 2021-03-18 RX ADMIN — Medication 60 MILLIGRAM(S): at 22:09

## 2021-03-18 RX ADMIN — Medication 50 MILLIGRAM(S): at 18:09

## 2021-03-18 RX ADMIN — Medication 50 MILLIGRAM(S): at 05:36

## 2021-03-18 RX ADMIN — CHLORHEXIDINE GLUCONATE 1 APPLICATION(S): 213 SOLUTION TOPICAL at 05:35

## 2021-03-18 RX ADMIN — HEPARIN SODIUM 5000 UNIT(S): 5000 INJECTION INTRAVENOUS; SUBCUTANEOUS at 22:09

## 2021-03-18 RX ADMIN — Medication 650 MILLIGRAM(S): at 03:48

## 2021-03-18 RX ADMIN — TRAMADOL HYDROCHLORIDE 25 MILLIGRAM(S): 50 TABLET ORAL at 22:10

## 2021-03-18 RX ADMIN — GABAPENTIN 100 MILLIGRAM(S): 400 CAPSULE ORAL at 18:09

## 2021-03-18 RX ADMIN — Medication 90 MILLIGRAM(S): at 11:24

## 2021-03-18 RX ADMIN — Medication 1 TABLET(S): at 11:24

## 2021-03-18 RX ADMIN — HEPARIN SODIUM 5000 UNIT(S): 5000 INJECTION INTRAVENOUS; SUBCUTANEOUS at 05:36

## 2021-03-18 RX ADMIN — PANTOPRAZOLE SODIUM 40 MILLIGRAM(S): 20 TABLET, DELAYED RELEASE ORAL at 11:24

## 2021-03-18 RX ADMIN — DULOXETINE HYDROCHLORIDE 60 MILLIGRAM(S): 30 CAPSULE, DELAYED RELEASE ORAL at 11:24

## 2021-03-18 RX ADMIN — Medication 650 MILLIGRAM(S): at 04:20

## 2021-03-18 RX ADMIN — GABAPENTIN 100 MILLIGRAM(S): 400 CAPSULE ORAL at 05:37

## 2021-03-18 RX ADMIN — ATORVASTATIN CALCIUM 80 MILLIGRAM(S): 80 TABLET, FILM COATED ORAL at 22:09

## 2021-03-18 NOTE — PROGRESS NOTE ADULT - ASSESSMENT
Patient is a 86y old m with severe anemia presenting with rest pain of LLE. Severe plaque burden of LLE on angiogram today, unable to cross with wire. Will need operative intervention and bypass for treatment of rest pain.     PLAN:    - medical and cardiac clearance for lower extremity bypass  - plan for operative intervention this weekend.

## 2021-03-18 NOTE — DIETITIAN INITIAL EVALUATION ADULT. - PERTINENT LABORATORY DATA
03-18 Na137 mmol/L Glu 157 mg/dL<H> K+ 3.6 mmol/L Cr  4.07 mg/dL<H> BUN 40.0 mg/dL<H> Phos 3.6 mg/dL Alb n/a   PAB n/a

## 2021-03-18 NOTE — PROGRESS NOTE ADULT - ASSESSMENT
Patient is an  86y old m with severe anemia presenting with rest pain of LLE. Severe plaque burden of LLE on angiogram today, unable to cross with wire.    PLAN:      - Lower extremity bypass      HD Saturday AM,    Surgery on Sunday, S/W the daughter,

## 2021-03-18 NOTE — PROGRESS NOTE ADULT - SUBJECTIVE AND OBJECTIVE BOX
Former[facility], [facility]                              [address] [address]                                                 PHONE: [phone]                                                 FAX: [phone]  -----------------------------------------------------------------------------------------------  Pt seen and examined. FU for TAVR    Overnight events/Complaints: Pt c/o foot pain. Breathing at baseline. No chest pain.    Vital Signs Last 24 Hrs  T(C): 36.9 (18 Mar 2021 05:33), Max: 36.9 (18 Mar 2021 05:33)  T(F): 98.4 (18 Mar 2021 05:33), Max: 98.4 (18 Mar 2021 05:33)  HR: 76 (18 Mar 2021 05:33) (64 - 76)  BP: 133/52 (18 Mar 2021 05:33) (133/52 - 168/63)  BP(mean): --  RR: 16 (18 Mar 2021 05:33) (16 - 18)  SpO2: 98% (18 Mar 2021 05:33) (93% - 100%)  I&O's Summary    17 Mar 2021 07:01  -  18 Mar 2021 07:00  --------------------------------------------------------  IN: 1040 mL / OUT: 1650 mL / NET: -610 mL    PHYSICAL EXAM:  Constitutional: Appears well developed, well nourished; alert and co-operative  HEENT:     Head: Normocephalic and atraumatic  Neck: supple. No JVD  Cardiovascular: regular S1 S2 + ESM  Respiratory: Lungs clear to auscultation; no crepitations, no wheeze  Gastrointestinal:  Soft, Non-tender, + BS	  Musculoskeletal: Normal range of motion. No edema  Skin: Warm and dry. No cyanosis . No diaphoresis.  Neurologic: Alert oriented to time place and person. Normal strength and no tremor.        LABS:                        9.6    6.43  )-----------( 159      ( 18 Mar 2021 06:47 )             32.5     03-18    137  |  97<L>  |  40.0<H>  ----------------------------<  157<H>  3.6   |  27.0  |  4.07<H>    Ca    8.5<L>      18 Mar 2021 06:47  Phos  3.6     03-18  Mg     2.0     03-18      RADIOLOGY & ADDITIONAL STUDIES: (reviewed)  CXR was independently visualized/reviewed  and demonstrated: clear lungs    CARDIOLOGY TESTING:(reviewed)   Echocardiogram:  Summary:   1. Moderately enlarged left atrium.   2. There is mild concentric left ventricular hypertrophy.   3. Left ventricular ejection fraction, by visual estimation, is 60 to 65%.   4. Grade II diastolic dysfunction. Elevated mean left atrial pressure.   5. Mildly enlarged right atrium.   6. Mildly enlarged right ventricle. Mildly reduced RV systolic function.   7. Mild mitral stenosis. Moderate mitral valve regurgitation. Elevated mean gradient likely due to volume overload. Repeat study after diuresis to erevaluate mitral valve. If clinically indicated.   8. 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 Well seated valve. Acceptable gradients. No regurgitation.   9. Mild tricuspid regurgitation.  10. Estimated pulmonary artery systolic pressure is 78 mmHg - severe pulmonary hypertension.  11. There is no evidence of pericardial effusion.    Catheterization:  RIGHT LOWER EXTREMITY VESSELS: Right superficial femoral: There was a 90 %  stenosis. Right peroneal: There was a 100 % stenosis.  COMPLICATIONS: No complications occurred during the cath lab visit.  Prepared and signed by  Siva Albrecht MD    TELEMETRY independently visualized/reviewed and demonstrated : NSR [ 70-80 ]  with no arrhythmias;     MEDICATIONS:(reviewed)  MEDICATIONS  (STANDING):  atorvastatin 80 milliGRAM(s) Oral at bedtime  chlorhexidine 4% Liquid 1 Application(s) Topical <User Schedule>  clopidogrel Tablet 75 milliGRAM(s) Oral daily  DULoxetine 60 milliGRAM(s) Oral daily  epoetin juan-epbx (RETACRIT) Injectable 06348 Unit(s) IV Push <User Schedule>  gabapentin 100 milliGRAM(s) Oral two times a day  heparin   Injectable 5000 Unit(s) SubCutaneous every 8 hours  hydrALAZINE 50 milliGRAM(s) Oral two times a day  isosorbide   mononitrate ER Tablet (IMDUR) 30 milliGRAM(s) Oral daily  Nephro-pito 1 Tablet(s) Oral daily  NIFEdipine XL 90 milliGRAM(s) Oral daily  NIFEdipine XL 60 milliGRAM(s) Oral <User Schedule>  pantoprazole    Tablet 40 milliGRAM(s) Oral every 12 hours  torsemide 20 milliGRAM(s) Oral

## 2021-03-18 NOTE — PROGRESS NOTE ADULT - SUBJECTIVE AND OBJECTIVE BOX
HPI/OVERNIGHT EVENTS: no acute events. LLE angio yesterday w/o intervention 2/2 heavy calcification. Plan for bypass this admission. AVSS      MEDICATIONS  (STANDING):  atorvastatin 80 milliGRAM(s) Oral at bedtime  chlorhexidine 4% Liquid 1 Application(s) Topical <User Schedule>  clopidogrel Tablet 75 milliGRAM(s) Oral daily  DULoxetine 60 milliGRAM(s) Oral daily  epoetin juan-epbx (RETACRIT) Injectable 33641 Unit(s) IV Push <User Schedule>  gabapentin 100 milliGRAM(s) Oral two times a day  heparin   Injectable 5000 Unit(s) SubCutaneous every 8 hours  hydrALAZINE 50 milliGRAM(s) Oral two times a day  HYDROmorphone  Injectable 0.5 milliGRAM(s) IV Push once  isosorbide   mononitrate ER Tablet (IMDUR) 30 milliGRAM(s) Oral daily  Nephro-pito 1 Tablet(s) Oral daily  NIFEdipine XL 90 milliGRAM(s) Oral daily  NIFEdipine XL 60 milliGRAM(s) Oral <User Schedule>  pantoprazole    Tablet 40 milliGRAM(s) Oral every 12 hours  torsemide 20 milliGRAM(s) Oral <User Schedule>    MEDICATIONS  (PRN):  acetaminophen   Tablet .. 650 milliGRAM(s) Oral every 6 hours PRN Temp greater or equal to 38C (100.4F), Mild Pain (1 - 3)      Vital Signs Last 24 Hrs  T(C): 36.9 (18 Mar 2021 05:33), Max: 36.9 (18 Mar 2021 05:33)  T(F): 98.4 (18 Mar 2021 05:33), Max: 98.4 (18 Mar 2021 05:33)  HR: 76 (18 Mar 2021 05:33) (64 - 76)  BP: 133/52 (18 Mar 2021 05:33) (133/52 - 168/63)  BP(mean): --  RR: 16 (18 Mar 2021 05:33) (16 - 18)  SpO2: 98% (18 Mar 2021 05:33) (93% - 100%)    PE  Gen: resting comfortably in bed, nad  Pulm: no increased wob, no conversational dyspnea  Abd: non-distended, soft, non-tender  Ext: distal extremities warm, no peripheral edema, dopplerable b/l DP, right groin with dressing in place c/d/i without strikethrough, no evidence of hematoma, no ecchymosis, no active bleeding. Right thigh soft.         I&O's Detail    17 Mar 2021 07:01  -  18 Mar 2021 07:00  --------------------------------------------------------  IN:    Other (mL): 800 mL  Total IN: 800 mL    OUT:    Other (mL): 1300 mL  Total OUT: 1300 mL    Total NET: -500 mL          LABS:                        9.6    6.43  )-----------( 159      ( 18 Mar 2021 06:47 )             32.5     03-18    137  |  97<L>  |  40.0<H>  ----------------------------<  157<H>  3.6   |  27.0  |  4.07<H>    Ca    8.5<L>      18 Mar 2021 06:47  Phos  3.6     03-18  Mg     2.0     03-18

## 2021-03-18 NOTE — DIETITIAN INITIAL EVALUATION ADULT. - OTHER INFO
Pt with h/o HTN, HLD, CAD, HFpEF, s/p Right CEA for Carotid artery disease, ESRD on HD 2d/week (M/Fri) via LUE fistula, s/p flecainide w/ ILR placed 6/2020, AS s/p TAVR, PAD with RLE revasc on 10/2020 on Plavix, anemia requiring prior transfusions, following at Dr Varghese office for aricept, who presents to ED for worsening weakness for last week in setting of noted darkened stools.   Pt admitted with severe anemia presenting with rest pain of LLE.  Pt awaiting medical and cardiac clearance for lower extremity bypass.

## 2021-03-18 NOTE — PROGRESS NOTE ADULT - ASSESSMENT
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     Pre-op cardiovascular evaluation  - Can proceed for planned surgery  with moderate perioperative risk without cardiac contraindications  - Plavix resumed  - Close monitoring of BP and volume status  - Post-op ECG and telemetry monitoring  - Continue remainder of cardiac / BP medications  - Needs perioperative antibiotics given presence of TAVR    HTN  - BP controlled    HLD  - continue statin    ESRD managed with HD

## 2021-03-18 NOTE — DIETITIAN INITIAL EVALUATION ADULT. - ORAL INTAKE PTA/DIET HISTORY
Pt currently sleeping.  Pt with fair po intake at meals per nursing flowsheets.  As per previous nutrition assessment (10/2020) pt had reported  lbs with admission wt 164lbs at that time.  Current admission wt 155 lbs- will continue to monitor. Pt currently sleeping.  Pt with fair po intake at meals per nursing flowsheets.  Multiple unopened bottles of Nepro noted on bedside table.  As per previous nutrition assessment (10/2020) pt had reported  lbs with admission wt 164lbs at that time.  Current admission wt 155 lbs- will continue to monitor.

## 2021-03-18 NOTE — DIETITIAN INITIAL EVALUATION ADULT. - ETIOLOGY
related to inability to consume sufficient protein energy intake with multiple hospitalizations; worsening left leg pain with weakness and RICHARDS

## 2021-03-18 NOTE — PROGRESS NOTE ADULT - SUBJECTIVE AND OBJECTIVE BOX
[facility] DIVISION OF KIDNEY DISEASES AND HYPERTENSION -- FOLLOW UP NOTE  --------------------------------------------------------------------------------  Chief Complaint: S/P Angiogram, No further  GI Bleed,     24 hour events/subjective:    PAST HISTORY  --------------------------------------------------------------------------------  No significant changes to PMH, PSH, FHx, SHx, unless otherwise noted    ALLERGIES & MEDICATIONS  --------------------------------------------------------------------------------  Allergies    Plavix (Hives)  Toprol-XL (Rash)    Standing Inpatient Medications  atorvastatin 80 milliGRAM(s) Oral at bedtime  chlorhexidine 4% Liquid 1 Application(s) Topical <User Schedule>  clopidogrel Tablet 75 milliGRAM(s) Oral daily  DULoxetine 60 milliGRAM(s) Oral daily  epoetin juan-epbx (RETACRIT) Injectable 49957 Unit(s) IV Push <User Schedule>  gabapentin 100 milliGRAM(s) Oral two times a day  heparin   Injectable 5000 Unit(s) SubCutaneous every 8 hours  hydrALAZINE 50 milliGRAM(s) Oral two times a day  isosorbide   mononitrate ER Tablet (IMDUR) 30 milliGRAM(s) Oral daily  Nephro-pito 1 Tablet(s) Oral daily  NIFEdipine XL 90 milliGRAM(s) Oral daily  NIFEdipine XL 60 milliGRAM(s) Oral <User Schedule>  pantoprazole    Tablet 40 milliGRAM(s) Oral every 12 hours  torsemide 20 milliGRAM(s) Oral <User Schedule>    PRN Inpatient Medications  acetaminophen   Tablet .. 650 milliGRAM(s) Oral every 6 hours PRN      REVIEW OF SYSTEMS  --------------------------------------------------------------------------------  Gen: No weight changes, fatigue, fevers/chills, weakness  Skin: No rashes  Head/Eyes/Ears/Mouth: No headache; Normal hearing; Normal vision w/o blurriness; No sinus pain/discomfort, sore throat  Respiratory: No dyspnea, cough, wheezing, hemoptysis  CV: No chest pain, PND, orthopnea  GI: No abdominal pain, diarrhea, constipation, nausea, vomiting, melena, hematochezia  : No increased frequency, dysuria, hematuria, nocturia  MSK: No joint pain/swelling; no back pain; no edema  Neuro: No dizziness/lightheadedness, weakness, seizures, numbness, tingling  Heme: No easy bruising or bleeding  Endo: No heat/cold intolerance  Psych: No significant nervousness, anxiety, stress, depression    All other systems were reviewed and are negative, except as noted.    VITALS/PHYSICAL EXAM  --------------------------------------------------------------------------------  T(C): 36.9 (03-18-21 @ 05:33), Max: 36.9 (03-18-21 @ 05:33)  HR: 76 (03-18-21 @ 05:33) (64 - 76)  BP: 133/52 (03-18-21 @ 05:33) (133/52 - 168/63)  RR: 16 (03-18-21 @ 05:33) (16 - 18)  SpO2: 98% (03-18-21 @ 05:33) (93% - 100%)  Height (cm): 165.1 (03-17-21 @ 07:17)  Weight (kg): 70.3 (03-17-21 @ 07:17)  BMI (kg/m2): 25.8 (03-17-21 @ 07:17)  BSA (m2): 1.77 (03-17-21 @ 07:17)      03-17-21 @ 07:01  -  03-18-21 @ 07:00  --------------------------------------------------------  IN: 1040 mL / OUT: 1650 mL / NET: -610 mL    Physical Exam:  	Gen: NAD, well-appearing, Pale,   	HEENT: PERRL, supple neck,   	Pulm: CTA B/L  	CV: RRR, S1S2; no rub  	Back: No spinal or CVA tenderness; no sacral edema  	Abd: +BS, soft, nontender/nondistended  	: No suprapubic tenderness  	UE: Warm, intact strength; no edema; no asterixis  	LE: Warm, intact strength; no edema, Ischemic Toes,   	Neuro: No focal deficits, Unsteady   gait  	Psych: Normal affect and mood  	Skin: Warm, without rashes  	Vascular access: AVF,    LABS/STUDIES  --------------------------------------------------------------------------------              9.6    6.43  >-----------<  159      [03-18-21 @ 06:47]              32.5     137  |  97  |  40.0  ----------------------------<  157      [03-18-21 @ 06:47]  3.6   |  27.0  |  4.07        Ca     8.5     [03-18-21 @ 06:47]      Mg     2.0     [03-18-21 @ 06:47]      Phos  3.6     [03-18-21 @ 06:47]    Creatinine Trend:  SCr 4.07 [03-18 @ 06:47]  SCr 6.48 [03-17 @ 16:57]  SCr 5.19 [03-16 @ 07:00]  SCr 3.82 [03-15 @ 05:48]  SCr 5.25 [03-14 @ 09:43]    Iron 53, TIBC 266, %sat 20      [08-19-20 @ 06:32]  Ferritin 184      [08-19-20 @ 06:32]  PTH -- (Ca 9.6)      [08-19-20 @ 16:08]   91  Vitamin D (25OH) 26.4      [08-19-20 @ 16:08]  HbA1c 4.9      [08-09-18 @ 05:54]  TSH 2.16      [09-15-20 @ 02:01]

## 2021-03-19 ENCOUNTER — TRANSCRIPTION ENCOUNTER (OUTPATIENT)
Age: 86
End: 2021-03-19

## 2021-03-19 LAB
ANION GAP SERPL CALC-SCNC: 11 MMOL/L — SIGNIFICANT CHANGE UP (ref 5–17)
ANION GAP SERPL CALC-SCNC: 15 MMOL/L — SIGNIFICANT CHANGE UP (ref 5–17)
APTT BLD: 36 SEC — HIGH (ref 27.5–35.5)
BASOPHILS # BLD AUTO: 0.06 K/UL — SIGNIFICANT CHANGE UP (ref 0–0.2)
BASOPHILS NFR BLD AUTO: 1.2 % — SIGNIFICANT CHANGE UP (ref 0–2)
BLD GP AB SCN SERPL QL: SIGNIFICANT CHANGE UP
BUN SERPL-MCNC: 26 MG/DL — HIGH (ref 8–20)
BUN SERPL-MCNC: 55 MG/DL — HIGH (ref 8–20)
CALCIUM SERPL-MCNC: 8.6 MG/DL — SIGNIFICANT CHANGE UP (ref 8.6–10.2)
CALCIUM SERPL-MCNC: 9.2 MG/DL — SIGNIFICANT CHANGE UP (ref 8.6–10.2)
CHLORIDE SERPL-SCNC: 96 MMOL/L — LOW (ref 98–107)
CHLORIDE SERPL-SCNC: 96 MMOL/L — LOW (ref 98–107)
CO2 SERPL-SCNC: 27 MMOL/L — SIGNIFICANT CHANGE UP (ref 22–29)
CO2 SERPL-SCNC: 30 MMOL/L — HIGH (ref 22–29)
CREAT SERPL-MCNC: 3.19 MG/DL — HIGH (ref 0.5–1.3)
CREAT SERPL-MCNC: 5.12 MG/DL — HIGH (ref 0.5–1.3)
EOSINOPHIL # BLD AUTO: 0.13 K/UL — SIGNIFICANT CHANGE UP (ref 0–0.5)
EOSINOPHIL NFR BLD AUTO: 2.6 % — SIGNIFICANT CHANGE UP (ref 0–6)
GLUCOSE SERPL-MCNC: 105 MG/DL — HIGH (ref 70–99)
GLUCOSE SERPL-MCNC: 176 MG/DL — HIGH (ref 70–99)
HCT VFR BLD CALC: 33.1 % — LOW (ref 39–50)
HCT VFR BLD CALC: 36.4 % — LOW (ref 39–50)
HGB BLD-MCNC: 10.8 G/DL — LOW (ref 13–17)
HGB BLD-MCNC: 9.9 G/DL — LOW (ref 13–17)
IMM GRANULOCYTES NFR BLD AUTO: 0.4 % — SIGNIFICANT CHANGE UP (ref 0–1.5)
INR BLD: 1.11 RATIO — SIGNIFICANT CHANGE UP (ref 0.88–1.16)
LYMPHOCYTES # BLD AUTO: 0.38 K/UL — LOW (ref 1–3.3)
LYMPHOCYTES # BLD AUTO: 7.5 % — LOW (ref 13–44)
MAGNESIUM SERPL-MCNC: 2 MG/DL — SIGNIFICANT CHANGE UP (ref 1.8–2.6)
MAGNESIUM SERPL-MCNC: 2.1 MG/DL — SIGNIFICANT CHANGE UP (ref 1.6–2.6)
MCHC RBC-ENTMCNC: 28.3 PG — SIGNIFICANT CHANGE UP (ref 27–34)
MCHC RBC-ENTMCNC: 28.3 PG — SIGNIFICANT CHANGE UP (ref 27–34)
MCHC RBC-ENTMCNC: 29.7 GM/DL — LOW (ref 32–36)
MCHC RBC-ENTMCNC: 29.9 GM/DL — LOW (ref 32–36)
MCV RBC AUTO: 94.6 FL — SIGNIFICANT CHANGE UP (ref 80–100)
MCV RBC AUTO: 95.3 FL — SIGNIFICANT CHANGE UP (ref 80–100)
MONOCYTES # BLD AUTO: 0.55 K/UL — SIGNIFICANT CHANGE UP (ref 0–0.9)
MONOCYTES NFR BLD AUTO: 10.9 % — SIGNIFICANT CHANGE UP (ref 2–14)
NEUTROPHILS # BLD AUTO: 3.9 K/UL — SIGNIFICANT CHANGE UP (ref 1.8–7.4)
NEUTROPHILS NFR BLD AUTO: 77.4 % — HIGH (ref 43–77)
PHOSPHATE SERPL-MCNC: 3.4 MG/DL — SIGNIFICANT CHANGE UP (ref 2.4–4.7)
PHOSPHATE SERPL-MCNC: 5.3 MG/DL — HIGH (ref 2.4–4.7)
PLATELET # BLD AUTO: 170 K/UL — SIGNIFICANT CHANGE UP (ref 150–400)
PLATELET # BLD AUTO: 186 K/UL — SIGNIFICANT CHANGE UP (ref 150–400)
POTASSIUM SERPL-MCNC: 3.7 MMOL/L — SIGNIFICANT CHANGE UP (ref 3.5–5.3)
POTASSIUM SERPL-MCNC: 4.1 MMOL/L — SIGNIFICANT CHANGE UP (ref 3.5–5.3)
POTASSIUM SERPL-SCNC: 3.7 MMOL/L — SIGNIFICANT CHANGE UP (ref 3.5–5.3)
POTASSIUM SERPL-SCNC: 4.1 MMOL/L — SIGNIFICANT CHANGE UP (ref 3.5–5.3)
PROTHROM AB SERPL-ACNC: 12.8 SEC — SIGNIFICANT CHANGE UP (ref 10.6–13.6)
RBC # BLD: 3.5 M/UL — LOW (ref 4.2–5.8)
RBC # BLD: 3.82 M/UL — LOW (ref 4.2–5.8)
RBC # FLD: 19.9 % — HIGH (ref 10.3–14.5)
RBC # FLD: 20.1 % — HIGH (ref 10.3–14.5)
SARS-COV-2 RNA SPEC QL NAA+PROBE: SIGNIFICANT CHANGE UP
SODIUM SERPL-SCNC: 137 MMOL/L — SIGNIFICANT CHANGE UP (ref 135–145)
SODIUM SERPL-SCNC: 138 MMOL/L — SIGNIFICANT CHANGE UP (ref 135–145)
WBC # BLD: 5.04 K/UL — SIGNIFICANT CHANGE UP (ref 3.8–10.5)
WBC # BLD: 5.53 K/UL — SIGNIFICANT CHANGE UP (ref 3.8–10.5)
WBC # FLD AUTO: 5.04 K/UL — SIGNIFICANT CHANGE UP (ref 3.8–10.5)
WBC # FLD AUTO: 5.53 K/UL — SIGNIFICANT CHANGE UP (ref 3.8–10.5)

## 2021-03-19 PROCEDURE — 90937 HEMODIALYSIS REPEATED EVAL: CPT

## 2021-03-19 RX ORDER — HYDROMORPHONE HYDROCHLORIDE 2 MG/ML
0.5 INJECTION INTRAMUSCULAR; INTRAVENOUS; SUBCUTANEOUS ONCE
Refills: 0 | Status: DISCONTINUED | OUTPATIENT
Start: 2021-03-19 | End: 2021-03-19

## 2021-03-19 RX ADMIN — GABAPENTIN 100 MILLIGRAM(S): 400 CAPSULE ORAL at 05:33

## 2021-03-19 RX ADMIN — ISOSORBIDE MONONITRATE 30 MILLIGRAM(S): 60 TABLET, EXTENDED RELEASE ORAL at 15:10

## 2021-03-19 RX ADMIN — GABAPENTIN 100 MILLIGRAM(S): 400 CAPSULE ORAL at 17:15

## 2021-03-19 RX ADMIN — Medication 975 MILLIGRAM(S): at 17:15

## 2021-03-19 RX ADMIN — HEPARIN SODIUM 5000 UNIT(S): 5000 INJECTION INTRAVENOUS; SUBCUTANEOUS at 15:10

## 2021-03-19 RX ADMIN — Medication 975 MILLIGRAM(S): at 23:17

## 2021-03-19 RX ADMIN — Medication 1 TABLET(S): at 15:10

## 2021-03-19 RX ADMIN — HYDROMORPHONE HYDROCHLORIDE 0.5 MILLIGRAM(S): 2 INJECTION INTRAMUSCULAR; INTRAVENOUS; SUBCUTANEOUS at 15:54

## 2021-03-19 RX ADMIN — HEPARIN SODIUM 5000 UNIT(S): 5000 INJECTION INTRAVENOUS; SUBCUTANEOUS at 05:33

## 2021-03-19 RX ADMIN — Medication 650 MILLIGRAM(S): at 10:57

## 2021-03-19 RX ADMIN — DULOXETINE HYDROCHLORIDE 60 MILLIGRAM(S): 30 CAPSULE, DELAYED RELEASE ORAL at 15:10

## 2021-03-19 RX ADMIN — Medication 50 MILLIGRAM(S): at 06:05

## 2021-03-19 RX ADMIN — Medication 975 MILLIGRAM(S): at 05:37

## 2021-03-19 RX ADMIN — CLOPIDOGREL BISULFATE 75 MILLIGRAM(S): 75 TABLET, FILM COATED ORAL at 15:10

## 2021-03-19 RX ADMIN — CHLORHEXIDINE GLUCONATE 1 APPLICATION(S): 213 SOLUTION TOPICAL at 05:37

## 2021-03-19 RX ADMIN — Medication 975 MILLIGRAM(S): at 11:35

## 2021-03-19 RX ADMIN — PANTOPRAZOLE SODIUM 40 MILLIGRAM(S): 20 TABLET, DELAYED RELEASE ORAL at 21:08

## 2021-03-19 RX ADMIN — ATORVASTATIN CALCIUM 80 MILLIGRAM(S): 80 TABLET, FILM COATED ORAL at 21:08

## 2021-03-19 RX ADMIN — Medication 60 MILLIGRAM(S): at 21:08

## 2021-03-19 RX ADMIN — TRAMADOL HYDROCHLORIDE 25 MILLIGRAM(S): 50 TABLET ORAL at 11:39

## 2021-03-19 RX ADMIN — HEPARIN SODIUM 5000 UNIT(S): 5000 INJECTION INTRAVENOUS; SUBCUTANEOUS at 21:08

## 2021-03-19 RX ADMIN — Medication 50 MILLIGRAM(S): at 17:15

## 2021-03-19 RX ADMIN — Medication 975 MILLIGRAM(S): at 05:32

## 2021-03-19 RX ADMIN — Medication 90 MILLIGRAM(S): at 05:33

## 2021-03-19 NOTE — PROGRESS NOTE ADULT - ASSESSMENT
Patient is an  86y old m with severe anemia presenting with rest pain of LLE. Severe plaque burden of LLE on angiogram today, unable to cross with wire.    PLAN:      - Lower extremity bypass      Saw on HD    HD Saturday AM, 2 hour short treatment;    Surgery on Sunday     dw surgery   Patient is an  86y old m with severe anemia presenting with rest pain of LLE. Severe plaque burden of LLE on angiogram today, unable to cross with wire.    PLAN:      - Lower extremity bypass      Saw on HD    Surgery scheduled for tomorrow AM    dw surgery; spoke with daughter on phone

## 2021-03-19 NOTE — PROGRESS NOTE ADULT - ASSESSMENT
Assessment  preop LLE bypass  s/p TAVR with preserved EF  mod MR normal EF  PAF presently in SR with VVI pacing  GIB not AC candidate  leadless RV pacer   HTN  ESRD on HD mildly volume overloaded at present    Rec  cont current meds  HD today for fluid removal  proceed with vascular surgery emmett, cardiac status optimal on current med therapy

## 2021-03-19 NOTE — PROGRESS NOTE ADULT - SUBJECTIVE AND OBJECTIVE BOX
[facility] DIVISION OF KIDNEY DISEASES AND HYPERTENSION -- FOLLOW UP NOTE  --------------------------------------------------------------------------------  Chief Complaint: S/P Angiogram, No further  GI Bleed,     24 hour events/subjective:  Seen/examined on hemodialysis  Complaining of foot pain; controlled at current;      PAST HISTORY  --------------------------------------------------------------------------------  No significant changes to PMH, PSH, FHx, SHx, unless otherwise noted    ALLERGIES & MEDICATIONS  --------------------------------------------------------------------------------  Allergies    Plavix (Hives)  Toprol-XL (Rash)    Intolerances      Standing Inpatient Medications  acetaminophen   Tablet .. 975 milliGRAM(s) Oral every 6 hours  atorvastatin 80 milliGRAM(s) Oral at bedtime  chlorhexidine 4% Liquid 1 Application(s) Topical <User Schedule>  clopidogrel Tablet 75 milliGRAM(s) Oral daily  DULoxetine 60 milliGRAM(s) Oral daily  epoetin juan-epbx (RETACRIT) Injectable 95361 Unit(s) IV Push <User Schedule>  gabapentin 100 milliGRAM(s) Oral two times a day  heparin   Injectable 5000 Unit(s) SubCutaneous every 8 hours  hydrALAZINE 50 milliGRAM(s) Oral two times a day  isosorbide   mononitrate ER Tablet (IMDUR) 30 milliGRAM(s) Oral daily  Nephro-pito 1 Tablet(s) Oral daily  NIFEdipine XL 90 milliGRAM(s) Oral daily  NIFEdipine XL 60 milliGRAM(s) Oral <User Schedule>  pantoprazole    Tablet 40 milliGRAM(s) Oral every 12 hours  torsemide 20 milliGRAM(s) Oral <User Schedule>    PRN Inpatient Medications  traMADol 25 milliGRAM(s) Oral every 4 hours PRN      REVIEW OF SYSTEMS  --------------------------------------------------------------------------------  Gen: No weight changes, fatigue, fevers/chills, weakness  Skin: No rashes  Head/Eyes/Ears/Mouth: No headache; Normal hearing; Normal vision w/o blurriness; No sinus pain/discomfort, sore throat  Respiratory: No dyspnea, cough, wheezing, hemoptysis  CV: No chest pain, PND, orthopnea  GI: No abdominal pain, diarrhea, constipation, nausea, vomiting, melena, hematochezia  : No increased frequency, dysuria, hematuria, nocturia  MSK: No joint pain/swelling; no back pain; no edema  Neuro: No dizziness/lightheadedness, weakness, seizures, numbness, tingling  Heme: No easy bruising or bleeding  Endo: No heat/cold intolerance  Psych: No significant nervousness, anxiety, stress, depression    All other systems were reviewed and are negative, except as noted.    VITALS/PHYSICAL EXAM  --------------------------------------------------------------------------------  T(C): 36.3 (03-19-21 @ 10:36), Max: 36.8 (03-18-21 @ 15:55)  HR: 72 (03-19-21 @ 10:36) (67 - 79)  BP: 125/57 (03-19-21 @ 10:36) (125/57 - 165/65)  RR: 18 (03-19-21 @ 10:36) (18 - 19)  SpO2: 93% (03-19-21 @ 10:36) (93% - 96%)  Wt(kg): --        03-18-21 @ 07:01  -  03-19-21 @ 07:00  --------------------------------------------------------  IN: 240 mL / OUT: 100 mL / NET: 140 mL      Physical Exam:  	Gen: NAD, well-appearing, Pale,   	HEENT: PERRL, supple neck,   	Pulm: CTA B/L  	CV: RRR, S1S2; no rub  	Back: No spinal or CVA tenderness; no sacral edema  	Abd: +BS, soft, nontender/nondistended  	: No suprapubic tenderness  	UE: Warm, intact strength; no edema; no asterixis  	LE: Warm, intact strength; no edema, Ischemic Toes,   	Neuro: No focal deficits, Unsteady   gait  	Psych: Normal affect and mood  	Skin: Warm, without rashes  	Vascular access: AVF,    LABS/STUDIES  --------------------------------------------------------------------------------              9.9    5.04  >-----------<  170      [03-19-21 @ 05:59]              33.1     138  |  96  |  55.0  ----------------------------<  105      [03-19-21 @ 05:59]  3.7   |  27.0  |  5.12        Ca     8.6     [03-19-21 @ 05:59]      Mg     2.1     [03-19-21 @ 05:59]      Phos  5.3     [03-19-21 @ 05:59]            Creatinine Trend:  SCr 5.12 [03-19 @ 05:59]  SCr 4.07 [03-18 @ 06:47]  SCr 6.48 [03-17 @ 16:57]  SCr 5.19 [03-16 @ 07:00]  SCr 3.82 [03-15 @ 05:48]        Iron 53, TIBC 266, %sat 20      [08-19-20 @ 06:32]  Ferritin 184      [08-19-20 @ 06:32]  PTH -- (Ca 9.6)      [08-19-20 @ 16:08]   91  Vitamin D (25OH) 26.4      [08-19-20 @ 16:08]  HbA1c 4.9      [08-09-18 @ 05:54]  TSH 2.16      [09-15-20 @ 02:01]

## 2021-03-19 NOTE — PROGRESS NOTE ADULT - SUBJECTIVE AND OBJECTIVE BOX
[facility], [facility]                                   [address] [place] [address]                                                      PHONE: [phone]                                                         FAX: [phone]  [email or web address]  ---------------------------------------------------------------------------------------------------------------------------------    Overnight events/patient complaints: OOB in chair asx, for HD today, tele hailey VVI pacing underlying SR with AV dissociation sec to leadless PPM      Plavix (Hives)  Toprol-XL (Rash)    MEDICATIONS  (STANDING):  acetaminophen   Tablet .. 975 milliGRAM(s) Oral every 6 hours  atorvastatin 80 milliGRAM(s) Oral at bedtime  chlorhexidine 4% Liquid 1 Application(s) Topical <User Schedule>  clopidogrel Tablet 75 milliGRAM(s) Oral daily  DULoxetine 60 milliGRAM(s) Oral daily  epoetin juan-epbx (RETACRIT) Injectable 98020 Unit(s) IV Push <User Schedule>  gabapentin 100 milliGRAM(s) Oral two times a day  heparin   Injectable 5000 Unit(s) SubCutaneous every 8 hours  hydrALAZINE 50 milliGRAM(s) Oral two times a day  isosorbide   mononitrate ER Tablet (IMDUR) 30 milliGRAM(s) Oral daily  Nephro-pito 1 Tablet(s) Oral daily  NIFEdipine XL 90 milliGRAM(s) Oral daily  NIFEdipine XL 60 milliGRAM(s) Oral <User Schedule>  pantoprazole    Tablet 40 milliGRAM(s) Oral every 12 hours  torsemide 20 milliGRAM(s) Oral <User Schedule>    MEDICATIONS  (PRN):  traMADol 25 milliGRAM(s) Oral every 4 hours PRN Severe Pain (7 - 10)      Vital Signs Last 24 Hrs  T(C): 36.5 (19 Mar 2021 05:31), Max: 36.8 (18 Mar 2021 15:55)  T(F): 97.7 (19 Mar 2021 05:31), Max: 98.2 (18 Mar 2021 15:55)  HR: 67 (19 Mar 2021 05:31) (67 - 72)  BP: 165/65 (19 Mar 2021 05:31) (140/77 - 165/65)  BP(mean): --  RR: 19 (19 Mar 2021 05:31) (16 - 19)  SpO2: 95% (19 Mar 2021 05:31) (94% - 96%)  Daily     Daily Weight in k.2 (19 Mar 2021 04:30)  ICU Vital Signs Last 24 Hrs  CHRISTIANO ELIZABETH  I&O's Detail    18 Mar 2021 07:01  -  19 Mar 2021 07:00  --------------------------------------------------------  IN:    Oral Fluid: 240 mL  Total IN: 240 mL    OUT:    Voided (mL): 100 mL  Total OUT: 100 mL    Total NET: 140 mL        I&O's Summary    18 Mar 2021 07:01  -  19 Mar 2021 07:00  --------------------------------------------------------  IN: 240 mL / OUT: 100 mL / NET: 140 mL      Drug Dosing Weight  CHRISTIANO ELIZABETH      PHYSICAL EXAM:  General: Appears well developed, well nourished alert and cooperative.  HEENT: Head; normocephalic, atraumatic.  Eyes: Pupils reactive, cornea wnl.  Neck: Supple, no nodes adenopathy, no NVD or carotid bruit or thyromegaly.  CARDIOVASCULAR: Normal S1 and S2, 2/6 systolic murmur at base  LUNGS: No rales, rhonchi or wheeze. Rales at bases  ABDOMEN: Soft, nontender without mass or organomegaly. bowel sounds normoactive.  EXTREMITIES: No clubbing, cyanosis or edema. Distal pulses wnl.   SKIN: warm and dry with normal turgor.  NEURO: Alert/oriented x 3/normal motor exam. No pathologic reflexes.    PSYCH: normal affect.        LABS:                        9.9    5.04  )-----------( 170      ( 19 Mar 2021 05:59 )             33.1     03-19    138  |  96<L>  |  55.0<H>  ----------------------------<  105<H>  3.7   |  27.0  |  5.12<H>    Ca    8.6      19 Mar 2021 05:59  Phos  5.3     -19  Mg     2.1     -      CHRISTIANOOSBALDO JHACRYSTAL            RADIOLOGY & ADDITIONAL STUDIES:    INTERPRETATION OF TELEMETRY (personally reviewed):    ECG:< from: 12 Lead ECG (21 @ 18:48) >  Diagnosis Line Ventricular-paced rhythm      Confirmed by Kevin Fuentes ([id number]) on 3/15/2021 8:55:25 PM    < end of copied text >      ECHO:< from: TTE Echo Complete w/o Contrast w/ Doppler (21 @ 11:36) >    Summary:   1. Moderately enlarged left atrium.   2. There is mild concentric left ventricular hypertrophy.   3. Left ventricular ejection fraction, by visual estimation, is 60 to 65%.   4. Grade II diastolic dysfunction. Elevated mean left atrial pressure.   5. Mildly enlarged right atrium.   6. Mildly enlarged right ventricle. Mildly reduced RV systolic function.   7. Mild mitral stenosis. Moderate mitral valve regurgitation. Elevated mean gradient likely due to volume overload. Repeat study after diuresis to erevaluate mitral valve. If clinically indicated.   8. 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 Well seated valve. Acceptable gradients. No regurgitation.   9. Mild tricuspid regurgitation.  10. Estimated pulmonary artery systolic pressure is 78 mmHg - severe pulmonary hypertension.  11. There is no evidence of pericardial effusion.    MD Rohith, RPVI Electronically signed on 3/14/2021 at 6:02:57 PM            *** Final ***            < end of copied text >

## 2021-03-19 NOTE — PROGRESS NOTE ADULT - SUBJECTIVE AND OBJECTIVE BOX
INTERVAL HPI/OVERNIGHT EVENTS/SUBJECTIVE:  no acute events. s/p LLE angio POD 2 w/o intervention 2/2 heavy calcification. Plan for fem-pop tomorrow, 3/20.     ICU Vital Signs Last 24 Hrs  T(C): 36.5 (19 Mar 2021 05:31), Max: 36.8 (18 Mar 2021 15:55)  T(F): 97.7 (19 Mar 2021 05:31), Max: 98.2 (18 Mar 2021 15:55)  HR: 67 (19 Mar 2021 05:31) (67 - 72)  BP: 165/65 (19 Mar 2021 05:31) (140/77 - 165/65)  BP(mean): --  ABP: --  ABP(mean): --  RR: 19 (19 Mar 2021 05:31) (16 - 19)  SpO2: 95% (19 Mar 2021 05:31) (94% - 96%)      I&O's Detail    18 Mar 2021 07:01  -  19 Mar 2021 07:00  --------------------------------------------------------  IN:    Oral Fluid: 240 mL  Total IN: 240 mL    OUT:    Voided (mL): 100 mL  Total OUT: 100 mL    Total NET: 140 mL    MEDICATIONS  (STANDING):  acetaminophen   Tablet .. 975 milliGRAM(s) Oral every 6 hours  atorvastatin 80 milliGRAM(s) Oral at bedtime  chlorhexidine 4% Liquid 1 Application(s) Topical <User Schedule>  clopidogrel Tablet 75 milliGRAM(s) Oral daily  DULoxetine 60 milliGRAM(s) Oral daily  epoetin juan-epbx (RETACRIT) Injectable 25443 Unit(s) IV Push <User Schedule>  gabapentin 100 milliGRAM(s) Oral two times a day  heparin   Injectable 5000 Unit(s) SubCutaneous every 8 hours  hydrALAZINE 50 milliGRAM(s) Oral two times a day  isosorbide   mononitrate ER Tablet (IMDUR) 30 milliGRAM(s) Oral daily  Nephro-pito 1 Tablet(s) Oral daily  NIFEdipine XL 90 milliGRAM(s) Oral daily  NIFEdipine XL 60 milliGRAM(s) Oral <User Schedule>  pantoprazole    Tablet 40 milliGRAM(s) Oral every 12 hours  torsemide 20 milliGRAM(s) Oral <User Schedule>    MEDICATIONS  (PRN):  traMADol 25 milliGRAM(s) Oral every 4 hours PRN Severe Pain (7 - 10)      MISC:     PHYSICAL EXAM:    Gen: NAD, laying comfortably  Neurological: AAOx3  Pulmonary: non-labored, no accessory muscle use, on NC  Cardiovascular: s1 / s2   Gastrointestinal: soft, ntnd  Extremities: distal extremities warm, no peripheral edema, dopplerable b/l DP, right groin with dressing in place c/d/i without strikethrough, no evidence of hematoma, no ecchymosis, no active bleeding. compartments soft.       LABS:  CBC Full  -  ( 19 Mar 2021 05:59 )  WBC Count : 5.04 K/uL  RBC Count : 3.50 M/uL  Hemoglobin : 9.9 g/dL  Hematocrit : 33.1 %  Platelet Count - Automated : 170 K/uL  Mean Cell Volume : 94.6 fl  Mean Cell Hemoglobin : 28.3 pg  Mean Cell Hemoglobin Concentration : 29.9 gm/dL  Auto Neutrophil # : 3.90 K/uL  Auto Lymphocyte # : 0.38 K/uL  Auto Monocyte # : 0.55 K/uL  Auto Eosinophil # : 0.13 K/uL  Auto Basophil # : 0.06 K/uL  Auto Neutrophil % : 77.4 %  Auto Lymphocyte % : 7.5 %  Auto Monocyte % : 10.9 %  Auto Eosinophil % : 2.6 %  Auto Basophil % : 1.2 %    03-19    138  |  96<L>  |  55.0<H>  ----------------------------<  105<H>  3.7   |  27.0  |  5.12<H>    Ca    8.6      19 Mar 2021 05:59  Phos  5.3     03-19  Mg     2.1     03-19    RECENT CULTURES:  03-15 .Smear Esophageal Brushings XXXX XXXX   No Yeast cells seen.    ASSESSMENT/PLAN:  86yMale presenting with severe anemia presenting with rest pain of LLE. Severe plaque burden of LLE on angiogram, unable to cross with wire. Will need operative intervention and fem-pop bypass for treatment of rest pain, planned for tomorrow 3/20    PLAN:    - medical and cardiac cleared for lower extremity bypass  - plan for operative intervention tomorrow 3/20  - pre-op today, COVID test, f/u labs  -NPO after mn  -HD today  -encourage iss, oob, deep breathing exercises

## 2021-03-20 LAB
ANION GAP SERPL CALC-SCNC: 13 MMOL/L — SIGNIFICANT CHANGE UP (ref 5–17)
BUN SERPL-MCNC: 33 MG/DL — HIGH (ref 8–20)
CALCIUM SERPL-MCNC: 9 MG/DL — SIGNIFICANT CHANGE UP (ref 8.6–10.2)
CHLORIDE SERPL-SCNC: 96 MMOL/L — LOW (ref 98–107)
CO2 SERPL-SCNC: 28 MMOL/L — SIGNIFICANT CHANGE UP (ref 22–29)
CREAT SERPL-MCNC: 4.05 MG/DL — HIGH (ref 0.5–1.3)
GAS PNL BLDA: SIGNIFICANT CHANGE UP
GLUCOSE SERPL-MCNC: 90 MG/DL — SIGNIFICANT CHANGE UP (ref 70–99)
HCT VFR BLD CALC: 35.7 % — LOW (ref 39–50)
HGB BLD-MCNC: 10.8 G/DL — LOW (ref 13–17)
MCHC RBC-ENTMCNC: 29.1 PG — SIGNIFICANT CHANGE UP (ref 27–34)
MCHC RBC-ENTMCNC: 30.3 GM/DL — LOW (ref 32–36)
MCV RBC AUTO: 96.2 FL — SIGNIFICANT CHANGE UP (ref 80–100)
PLATELET # BLD AUTO: 208 K/UL — SIGNIFICANT CHANGE UP (ref 150–400)
POTASSIUM SERPL-MCNC: 4 MMOL/L — SIGNIFICANT CHANGE UP (ref 3.5–5.3)
POTASSIUM SERPL-SCNC: 4 MMOL/L — SIGNIFICANT CHANGE UP (ref 3.5–5.3)
RBC # BLD: 3.71 M/UL — LOW (ref 4.2–5.8)
RBC # FLD: 19.7 % — HIGH (ref 10.3–14.5)
SODIUM SERPL-SCNC: 137 MMOL/L — SIGNIFICANT CHANGE UP (ref 135–145)
WBC # BLD: 6.22 K/UL — SIGNIFICANT CHANGE UP (ref 3.8–10.5)
WBC # FLD AUTO: 6.22 K/UL — SIGNIFICANT CHANGE UP (ref 3.8–10.5)

## 2021-03-20 PROCEDURE — 35656 BPG FEMORAL-POPLITEAL: CPT | Mod: LT

## 2021-03-20 PROCEDURE — 99233 SBSQ HOSP IP/OBS HIGH 50: CPT

## 2021-03-20 PROCEDURE — 93010 ELECTROCARDIOGRAM REPORT: CPT

## 2021-03-20 RX ORDER — OXYCODONE HYDROCHLORIDE 5 MG/1
10 TABLET ORAL EVERY 6 HOURS
Refills: 0 | Status: DISCONTINUED | OUTPATIENT
Start: 2021-03-20 | End: 2021-03-22

## 2021-03-20 RX ORDER — OXYCODONE HYDROCHLORIDE 5 MG/1
5 TABLET ORAL EVERY 6 HOURS
Refills: 0 | Status: DISCONTINUED | OUTPATIENT
Start: 2021-03-20 | End: 2021-03-22

## 2021-03-20 RX ORDER — SODIUM CHLORIDE 9 MG/ML
1000 INJECTION, SOLUTION INTRAVENOUS
Refills: 0 | Status: DISCONTINUED | OUTPATIENT
Start: 2021-03-20 | End: 2021-03-20

## 2021-03-20 RX ORDER — FENTANYL CITRATE 50 UG/ML
25 INJECTION INTRAVENOUS
Refills: 0 | Status: DISCONTINUED | OUTPATIENT
Start: 2021-03-20 | End: 2021-03-20

## 2021-03-20 RX ORDER — CLOPIDOGREL BISULFATE 75 MG/1
75 TABLET, FILM COATED ORAL ONCE
Refills: 0 | Status: COMPLETED | OUTPATIENT
Start: 2021-03-20 | End: 2021-03-20

## 2021-03-20 RX ADMIN — FENTANYL CITRATE 25 MICROGRAM(S): 50 INJECTION INTRAVENOUS at 13:12

## 2021-03-20 RX ADMIN — Medication 50 MILLIGRAM(S): at 17:24

## 2021-03-20 RX ADMIN — FENTANYL CITRATE 25 MICROGRAM(S): 50 INJECTION INTRAVENOUS at 11:36

## 2021-03-20 RX ADMIN — Medication 60 MILLIGRAM(S): at 22:35

## 2021-03-20 RX ADMIN — ERYTHROPOIETIN 12000 UNIT(S): 10000 INJECTION, SOLUTION INTRAVENOUS; SUBCUTANEOUS at 15:00

## 2021-03-20 RX ADMIN — ATORVASTATIN CALCIUM 80 MILLIGRAM(S): 80 TABLET, FILM COATED ORAL at 22:38

## 2021-03-20 RX ADMIN — Medication 90 MILLIGRAM(S): at 05:18

## 2021-03-20 RX ADMIN — Medication 975 MILLIGRAM(S): at 05:18

## 2021-03-20 RX ADMIN — GABAPENTIN 100 MILLIGRAM(S): 400 CAPSULE ORAL at 05:18

## 2021-03-20 RX ADMIN — Medication 975 MILLIGRAM(S): at 17:24

## 2021-03-20 RX ADMIN — Medication 975 MILLIGRAM(S): at 22:40

## 2021-03-20 RX ADMIN — HEPARIN SODIUM 5000 UNIT(S): 5000 INJECTION INTRAVENOUS; SUBCUTANEOUS at 22:34

## 2021-03-20 RX ADMIN — TRAMADOL HYDROCHLORIDE 25 MILLIGRAM(S): 50 TABLET ORAL at 01:43

## 2021-03-20 RX ADMIN — PANTOPRAZOLE SODIUM 40 MILLIGRAM(S): 20 TABLET, DELAYED RELEASE ORAL at 22:34

## 2021-03-20 RX ADMIN — FENTANYL CITRATE 25 MICROGRAM(S): 50 INJECTION INTRAVENOUS at 12:04

## 2021-03-20 RX ADMIN — CLOPIDOGREL BISULFATE 75 MILLIGRAM(S): 75 TABLET, FILM COATED ORAL at 13:09

## 2021-03-20 RX ADMIN — OXYCODONE HYDROCHLORIDE 5 MILLIGRAM(S): 5 TABLET ORAL at 17:28

## 2021-03-20 RX ADMIN — CHLORHEXIDINE GLUCONATE 1 APPLICATION(S): 213 SOLUTION TOPICAL at 05:15

## 2021-03-20 RX ADMIN — Medication 975 MILLIGRAM(S): at 00:53

## 2021-03-20 RX ADMIN — GABAPENTIN 100 MILLIGRAM(S): 400 CAPSULE ORAL at 17:27

## 2021-03-20 RX ADMIN — Medication 50 MILLIGRAM(S): at 05:18

## 2021-03-20 NOTE — PROGRESS NOTE ADULT - ASSESSMENT
Patient is an  86y old m with severe anemia presenting with rest pain of LLE. Severe plaque burden of LLE on angiogram today, unable to cross with wire.  s/p bypass femoral-popliteal  ESRD on HD- twice weekly schedule ([facility] outpt)  Will keep on TS schedule inpt  Anemia: Hb at goal; continue AMY  BP stable; continue current regimen    will follow

## 2021-03-20 NOTE — PROGRESS NOTE ADULT - SUBJECTIVE AND OBJECTIVE BOX
[facility] DIVISION OF KIDNEY DISEASES AND HYPERTENSION -- HEMODIALYSIS NOTE  --------------------------------------------------------------------------------  Chief Complaint: ESRD/Ongoing hemodialysis requirement    24 hour events/subjective:  s/p OR this am      PAST HISTORY  --------------------------------------------------------------------------------  No significant changes to PMH, PSH, FHx, SHx, unless otherwise noted    ALLERGIES & MEDICATIONS  --------------------------------------------------------------------------------  Allergies    Plavix (Hives)  Toprol-XL (Rash)    Intolerances      Standing Inpatient Medications  acetaminophen   Tablet .. 975 milliGRAM(s) Oral every 6 hours  atorvastatin 80 milliGRAM(s) Oral at bedtime  chlorhexidine 4% Liquid 1 Application(s) Topical <User Schedule>  clopidogrel Tablet 75 milliGRAM(s) Oral daily  DULoxetine 60 milliGRAM(s) Oral daily  epoetin juan-epbx (RETACRIT) Injectable 95332 Unit(s) IV Push <User Schedule>  gabapentin 100 milliGRAM(s) Oral two times a day  heparin   Injectable 5000 Unit(s) SubCutaneous every 8 hours  hydrALAZINE 50 milliGRAM(s) Oral two times a day  isosorbide   mononitrate ER Tablet (IMDUR) 30 milliGRAM(s) Oral daily  lactated ringers. 1000 milliLiter(s) IV Continuous <Continuous>  Nephro-pito 1 Tablet(s) Oral daily  NIFEdipine XL 90 milliGRAM(s) Oral daily  NIFEdipine XL 60 milliGRAM(s) Oral <User Schedule>  pantoprazole    Tablet 40 milliGRAM(s) Oral every 12 hours  torsemide 20 milliGRAM(s) Oral <User Schedule>    PRN Inpatient Medications  fentaNYL    Injectable 25 MICROGram(s) IV Push every 5 minutes PRN  oxyCODONE    IR 5 milliGRAM(s) Oral every 6 hours PRN  oxyCODONE    IR 10 milliGRAM(s) Oral every 6 hours PRN      REVIEW OF SYSTEMS  --------------------------------------------------------------------------------  Gen: No weight changes, fatigue, fevers/chills, weakness  Skin: No rashes  Head/Eyes/Ears/Mouth: No headache  Respiratory: No dyspnea, cough,  CV: No chest pain, orthopnea  GI: No abdominal pain, diarrhea, constipation, nausea, vomiting,  MSK: No joint pain  Neuro: No dizziness/lightheadedness, weakness  Heme: No bleeding  Psych: No significant nervousness, anxiety, stress, depression    All other systems were reviewed and are negative, except as noted.    VITALS/PHYSICAL EXAM  --------------------------------------------------------------------------------  T(C): 37 (03-20-21 @ 13:30), Max: 37.8 (03-20-21 @ 11:22)  HR: 68 (03-20-21 @ 13:30) (56 - 77)  BP: 132/53 (03-20-21 @ 13:30) (120/70 - 166/58)  RR: 16 (03-20-21 @ 13:30) (12 - 18)  SpO2: 97% (03-20-21 @ 13:30) (92% - 98%)  Wt(kg): --        03-19-21 @ 07:01  -  03-20-21 @ 07:00  --------------------------------------------------------  IN: 800 mL / OUT: 1800 mL / NET: -1000 mL      Physical Exam:  	Gen: NAD  	HEENT: PERRL, supple neck,  	Pulm: CTA B/L  	CV: RRR, S1S2; no rub  	Abd: +BS, soft, nontender  	UE: Warm  	LE: Warm, no edema  	Neuro: No focal deficits  	Psych: Normal affect and mood  	Skin: Warm, without rashes  	Vascular access: AVF    LABS/STUDIES  --------------------------------------------------------------------------------              10.8   6.22  >-----------<  208      [03-20-21 @ 06:27]              35.7     137  |  96  |  33.0  ----------------------------<  90      [03-20-21 @ 06:27]  4.0   |  28.0  |  4.05        Ca     9.0     [03-20-21 @ 06:27]      Mg     2.0     [03-19-21 @ 17:09]      Phos  3.4     [03-19-21 @ 17:09]      PT/INR: PT 12.8 , INR 1.11       [03-19-21 @ 17:09]  PTT: 36.0       [03-19-21 @ 17:09]      Iron 53, TIBC 266, %sat 20      [08-19-20 @ 06:32]  Ferritin 184      [08-19-20 @ 06:32]  PTH -- (Ca 9.6)      [08-19-20 @ 16:08]   91  Vitamin D (25OH) 26.4      [08-19-20 @ 16:08]  HbA1c 4.9      [08-09-18 @ 05:54]  TSH 2.16      [09-15-20 @ 02:01]

## 2021-03-20 NOTE — PROGRESS NOTE ADULT - SUBJECTIVE AND OBJECTIVE BOX
HPI/OVERNIGHT EVENTS:  no acute events overnight. pt remains npo and is pre-opped for a fem-pop bypass today. remains afebrile.    MEDICATIONS  (STANDING):  acetaminophen   Tablet .. 975 milliGRAM(s) Oral every 6 hours  atorvastatin 80 milliGRAM(s) Oral at bedtime  chlorhexidine 4% Liquid 1 Application(s) Topical <User Schedule>  clopidogrel Tablet 75 milliGRAM(s) Oral daily  DULoxetine 60 milliGRAM(s) Oral daily  epoetin juan-epbx (RETACRIT) Injectable 43652 Unit(s) IV Push <User Schedule>  gabapentin 100 milliGRAM(s) Oral two times a day  heparin   Injectable 5000 Unit(s) SubCutaneous every 8 hours  hydrALAZINE 50 milliGRAM(s) Oral two times a day  isosorbide   mononitrate ER Tablet (IMDUR) 30 milliGRAM(s) Oral daily  Nephro-pito 1 Tablet(s) Oral daily  NIFEdipine XL 90 milliGRAM(s) Oral daily  NIFEdipine XL 60 milliGRAM(s) Oral <User Schedule>  pantoprazole    Tablet 40 milliGRAM(s) Oral every 12 hours  torsemide 20 milliGRAM(s) Oral <User Schedule>    MEDICATIONS  (PRN):  traMADol 25 milliGRAM(s) Oral every 4 hours PRN Severe Pain (7 - 10)      Vital Signs Last 24 Hrs  T(C): 36.7 (19 Mar 2021 15:46), Max: 36.7 (19 Mar 2021 15:46)  T(F): 98 (19 Mar 2021 15:46), Max: 98 (19 Mar 2021 15:46)  HR: 67 (20 Mar 2021 01:42) (62 - 79)  BP: 146/59 (20 Mar 2021 01:42) (125/57 - 166/58)  BP(mean): --  RR: 17 (19 Mar 2021 15:46) (17 - 19)  SpO2: 93% (19 Mar 2021 15:46) (93% - 98%)    Gen: NAD, laying comfortably  Neurological: AAOx3  Pulmonary: non-labored, no accessory muscle use, on NC  Cardiovascular: s1 / s2   Gastrointestinal: soft, ntnd  Extremities: distal extremities warm, no peripheral edema, dopplerable b/l DP, right groin with dressing in place c/d/i without strikethrough, no evidence of hematoma, no ecchymosis, no active bleeding. compartments soft.       I&O's Detail    18 Mar 2021 07:01  -  19 Mar 2021 07:00  --------------------------------------------------------  IN:    Oral Fluid: 240 mL  Total IN: 240 mL    OUT:    Voided (mL): 100 mL  Total OUT: 100 mL    Total NET: 140 mL      19 Mar 2021 07:01  -  20 Mar 2021 02:21  --------------------------------------------------------  IN:    Other (mL): 800 mL  Total IN: 800 mL    OUT:    Other (mL): 1800 mL  Total OUT: 1800 mL    Total NET: -1000 mL          LABS:                        10.8   5.53  )-----------( 186      ( 19 Mar 2021 17:09 )             36.4     03-19    137  |  96<L>  |  26.0<H>  ----------------------------<  176<H>  4.1   |  30.0<H>  |  3.19<H>    Ca    9.2      19 Mar 2021 17:09  Phos  3.4     03-19  Mg     2.0     03-19      PT/INR - ( 19 Mar 2021 17:09 )   PT: 12.8 sec;   INR: 1.11 ratio         PTT - ( 19 Mar 2021 17:09 )  PTT:36.0 sec

## 2021-03-20 NOTE — PROGRESS NOTE ADULT - ASSESSMENT
86yMale presenting with severe anemia presenting with rest pain of LLE. Severe plaque burden of LLE on angiogram, unable to cross with wire. Will need operative intervention and fem-pop bypass for treatment of rest pain, planned for today  - npo/ivf  - OR today

## 2021-03-21 LAB
ACANTHOCYTES BLD QL SMEAR: SLIGHT — SIGNIFICANT CHANGE UP
ANION GAP SERPL CALC-SCNC: 18 MMOL/L — HIGH (ref 5–17)
ANISOCYTOSIS BLD QL: SLIGHT — SIGNIFICANT CHANGE UP
BASOPHILS # BLD AUTO: 0.18 K/UL — SIGNIFICANT CHANGE UP (ref 0–0.2)
BASOPHILS NFR BLD AUTO: 1.8 % — SIGNIFICANT CHANGE UP (ref 0–2)
BUN SERPL-MCNC: 43 MG/DL — HIGH (ref 8–20)
BURR CELLS BLD QL SMEAR: PRESENT — SIGNIFICANT CHANGE UP
CALCIUM SERPL-MCNC: 8 MG/DL — LOW (ref 8.6–10.2)
CHLORIDE SERPL-SCNC: 96 MMOL/L — LOW (ref 98–107)
CO2 SERPL-SCNC: 22 MMOL/L — SIGNIFICANT CHANGE UP (ref 22–29)
CREAT SERPL-MCNC: 5.49 MG/DL — HIGH (ref 0.5–1.3)
ELLIPTOCYTES BLD QL SMEAR: SLIGHT — SIGNIFICANT CHANGE UP
EOSINOPHIL # BLD AUTO: 0 K/UL — SIGNIFICANT CHANGE UP (ref 0–0.5)
EOSINOPHIL NFR BLD AUTO: 0 % — SIGNIFICANT CHANGE UP (ref 0–6)
GLUCOSE SERPL-MCNC: 161 MG/DL — HIGH (ref 70–99)
HCT VFR BLD CALC: 28.8 % — LOW (ref 39–50)
HGB BLD-MCNC: 8.5 G/DL — LOW (ref 13–17)
LYMPHOCYTES # BLD AUTO: 0 % — LOW (ref 13–44)
LYMPHOCYTES # BLD AUTO: 0 K/UL — LOW (ref 1–3.3)
MACROCYTES BLD QL: SLIGHT — SIGNIFICANT CHANGE UP
MAGNESIUM SERPL-MCNC: 2 MG/DL — SIGNIFICANT CHANGE UP (ref 1.6–2.6)
MANUAL SMEAR VERIFICATION: SIGNIFICANT CHANGE UP
MCHC RBC-ENTMCNC: 28.5 PG — SIGNIFICANT CHANGE UP (ref 27–34)
MCHC RBC-ENTMCNC: 29.5 GM/DL — LOW (ref 32–36)
MCV RBC AUTO: 96.6 FL — SIGNIFICANT CHANGE UP (ref 80–100)
MONOCYTES # BLD AUTO: 0.79 K/UL — SIGNIFICANT CHANGE UP (ref 0–0.9)
MONOCYTES NFR BLD AUTO: 8 % — SIGNIFICANT CHANGE UP (ref 2–14)
NEUTROPHILS # BLD AUTO: 8.88 K/UL — HIGH (ref 1.8–7.4)
NEUTROPHILS NFR BLD AUTO: 84.9 % — HIGH (ref 43–77)
NEUTS BAND # BLD: 5.3 % — SIGNIFICANT CHANGE UP (ref 0–8)
OVALOCYTES BLD QL SMEAR: SLIGHT — SIGNIFICANT CHANGE UP
PHOSPHATE SERPL-MCNC: 6.8 MG/DL — HIGH (ref 2.4–4.7)
PLAT MORPH BLD: NORMAL — SIGNIFICANT CHANGE UP
PLATELET # BLD AUTO: 229 K/UL — SIGNIFICANT CHANGE UP (ref 150–400)
POIKILOCYTOSIS BLD QL AUTO: SIGNIFICANT CHANGE UP
POLYCHROMASIA BLD QL SMEAR: SIGNIFICANT CHANGE UP
POTASSIUM SERPL-MCNC: 4.6 MMOL/L — SIGNIFICANT CHANGE UP (ref 3.5–5.3)
POTASSIUM SERPL-SCNC: 4.6 MMOL/L — SIGNIFICANT CHANGE UP (ref 3.5–5.3)
RBC # BLD: 2.98 M/UL — LOW (ref 4.2–5.8)
RBC # FLD: 19.8 % — HIGH (ref 10.3–14.5)
RBC BLD AUTO: ABNORMAL
SCHISTOCYTES BLD QL AUTO: SLIGHT — SIGNIFICANT CHANGE UP
SODIUM SERPL-SCNC: 136 MMOL/L — SIGNIFICANT CHANGE UP (ref 135–145)
WBC # BLD: 9.84 K/UL — SIGNIFICANT CHANGE UP (ref 3.8–10.5)
WBC # FLD AUTO: 9.84 K/UL — SIGNIFICANT CHANGE UP (ref 3.8–10.5)

## 2021-03-21 PROCEDURE — 99233 SBSQ HOSP IP/OBS HIGH 50: CPT

## 2021-03-21 RX ADMIN — Medication 975 MILLIGRAM(S): at 17:03

## 2021-03-21 RX ADMIN — Medication 60 MILLIGRAM(S): at 22:05

## 2021-03-21 RX ADMIN — Medication 90 MILLIGRAM(S): at 05:28

## 2021-03-21 RX ADMIN — Medication 1 TABLET(S): at 08:02

## 2021-03-21 RX ADMIN — GABAPENTIN 100 MILLIGRAM(S): 400 CAPSULE ORAL at 17:03

## 2021-03-21 RX ADMIN — Medication 975 MILLIGRAM(S): at 17:58

## 2021-03-21 RX ADMIN — Medication 20 MILLIGRAM(S): at 13:43

## 2021-03-21 RX ADMIN — Medication 975 MILLIGRAM(S): at 23:12

## 2021-03-21 RX ADMIN — GABAPENTIN 100 MILLIGRAM(S): 400 CAPSULE ORAL at 05:27

## 2021-03-21 RX ADMIN — OXYCODONE HYDROCHLORIDE 5 MILLIGRAM(S): 5 TABLET ORAL at 08:42

## 2021-03-21 RX ADMIN — OXYCODONE HYDROCHLORIDE 5 MILLIGRAM(S): 5 TABLET ORAL at 08:36

## 2021-03-21 RX ADMIN — CLOPIDOGREL BISULFATE 75 MILLIGRAM(S): 75 TABLET, FILM COATED ORAL at 08:01

## 2021-03-21 RX ADMIN — PANTOPRAZOLE SODIUM 40 MILLIGRAM(S): 20 TABLET, DELAYED RELEASE ORAL at 08:01

## 2021-03-21 RX ADMIN — Medication 50 MILLIGRAM(S): at 05:29

## 2021-03-21 RX ADMIN — CHLORHEXIDINE GLUCONATE 1 APPLICATION(S): 213 SOLUTION TOPICAL at 05:29

## 2021-03-21 RX ADMIN — HEPARIN SODIUM 5000 UNIT(S): 5000 INJECTION INTRAVENOUS; SUBCUTANEOUS at 13:43

## 2021-03-21 RX ADMIN — ATORVASTATIN CALCIUM 80 MILLIGRAM(S): 80 TABLET, FILM COATED ORAL at 21:55

## 2021-03-21 RX ADMIN — Medication 50 MILLIGRAM(S): at 17:04

## 2021-03-21 RX ADMIN — PANTOPRAZOLE SODIUM 40 MILLIGRAM(S): 20 TABLET, DELAYED RELEASE ORAL at 21:55

## 2021-03-21 RX ADMIN — Medication 975 MILLIGRAM(S): at 05:27

## 2021-03-21 RX ADMIN — DULOXETINE HYDROCHLORIDE 60 MILLIGRAM(S): 30 CAPSULE, DELAYED RELEASE ORAL at 08:02

## 2021-03-21 RX ADMIN — HEPARIN SODIUM 5000 UNIT(S): 5000 INJECTION INTRAVENOUS; SUBCUTANEOUS at 21:55

## 2021-03-21 RX ADMIN — HEPARIN SODIUM 5000 UNIT(S): 5000 INJECTION INTRAVENOUS; SUBCUTANEOUS at 05:29

## 2021-03-21 RX ADMIN — ISOSORBIDE MONONITRATE 30 MILLIGRAM(S): 60 TABLET, EXTENDED RELEASE ORAL at 08:02

## 2021-03-21 RX ADMIN — Medication 975 MILLIGRAM(S): at 13:42

## 2021-03-21 RX ADMIN — Medication 975 MILLIGRAM(S): at 13:58

## 2021-03-21 NOTE — PROGRESS NOTE ADULT - ASSESSMENT
Patient is an  86y old m with severe anemia presenting with rest pain of LLE. Severe plaque burden of LLE on angiogram today, unable to cross with wire.  s/p bypass femoral-popliteal  ESRD on HD- twice weekly schedule ([facility] outpt)  Anemia: Hb at goal; continue AMY  BP stable; continue current regimen    will follow

## 2021-03-21 NOTE — PROGRESS NOTE ADULT - SUBJECTIVE AND OBJECTIVE BOX
[facility]                                   [address] [place] [address]                                                      PHONE: [phone]                                                         FAX: [phone]  [email or web address]  ---------------------------------------------------------------------------------------------------------------------------------    Overnight events/patient complaints:  No cp/sob    PAST MEDICAL & SURGICAL HISTORY:  GI bleeding  due to ulcerated polyps and colonic AVMs    Aortic stenosis  - s/p valve replacement    ESRD on dialysis  HD on Mondays and Fridays    Risk factors for obstructive sleep apnea    Anemia    RICHARDS (dyspnea on exertion)    VT (ventricular tachycardia)    HTN (hypertension)    CAD (coronary artery disease)    Arrhythmia    AV block, 1st degree    DM (diabetes mellitus)  - resolved    History of loop recorder    S/P TAVR (transcatheter aortic valve replacement)    H/O carotid endarterectomy  Right    A-V fistula  left arm 5/2017    H/O angioplasty  2013,  no  intervention    H/O left knee surgery    H/O circumcision  at  age  65        Plavix (Hives)  Toprol-XL (Rash)    MEDICATIONS  (STANDING):  acetaminophen   Tablet .. 975 milliGRAM(s) Oral every 6 hours  atorvastatin 80 milliGRAM(s) Oral at bedtime  chlorhexidine 4% Liquid 1 Application(s) Topical <User Schedule>  clopidogrel Tablet 75 milliGRAM(s) Oral daily  DULoxetine 60 milliGRAM(s) Oral daily  epoetin juan-epbx (RETACRIT) Injectable 77879 Unit(s) IV Push <User Schedule>  gabapentin 100 milliGRAM(s) Oral two times a day  heparin   Injectable 5000 Unit(s) SubCutaneous every 8 hours  hydrALAZINE 50 milliGRAM(s) Oral two times a day  isosorbide   mononitrate ER Tablet (IMDUR) 30 milliGRAM(s) Oral daily  Nephro-pito 1 Tablet(s) Oral daily  NIFEdipine XL 90 milliGRAM(s) Oral daily  NIFEdipine XL 60 milliGRAM(s) Oral <User Schedule>  pantoprazole    Tablet 40 milliGRAM(s) Oral every 12 hours  torsemide 20 milliGRAM(s) Oral <User Schedule>    MEDICATIONS  (PRN):  oxyCODONE    IR 10 milliGRAM(s) Oral every 6 hours PRN Severe Pain (7 - 10)  oxyCODONE    IR 5 milliGRAM(s) Oral every 6 hours PRN Moderate Pain (4 - 6)      Vital Signs Last 24 Hrs  T(C): 36.7 (21 Mar 2021 07:41), Max: 37 (20 Mar 2021 13:30)  T(F): 98 (21 Mar 2021 07:41), Max: 98.6 (20 Mar 2021 13:30)  HR: 88 (21 Mar 2021 07:41) (59 - 88)  BP: 156/61 (21 Mar 2021 07:41) (129/39 - 156/61)  BP(mean): --  RR: 18 (21 Mar 2021 07:41) (12 - 18)  SpO2: 96% (21 Mar 2021 07:41) (92% - 97%)  ICU Vital Signs Last 24 Hrs  CHRISTIANO ELIZABETH  I&O's Detail    I&O's Summary    Drug Dosing Weight  CHRISTIANO ELIZABETH      PHYSICAL EXAM:  General: Appears well developed, well nourished alert and cooperative.  HEENT: Head; normocephalic, atraumatic.  Eyes: Pupils reactive, cornea wnl.  Neck: Supple, no nodes adenopathy, no NVD or carotid bruit or thyromegaly.  CARDIOVASCULAR: Normal S1 and S2, No murmur, rub, gallop or lift.   LUNGS: No rales, rhonchi or wheeze. Normal breath sounds bilaterally.  ABDOMEN: Soft, nontender without mass or organomegaly. bowel sounds normoactive.  EXTREMITIES: No clubbing, cyanosis or edema. Distal pulses wnl.   SKIN: warm and dry with normal turgor.  NEURO: Alert/oriented x 3/normal motor exam. No pathologic reflexes.    PSYCH: normal affect.        LABS:                        8.5    9.84  )-----------( 229      ( 21 Mar 2021 06:46 )             28.8     03-21    136  |  96<L>  |  43.0<H>  ----------------------------<  161<H>  4.6   |  22.0  |  5.49<H>    Ca    8.0<L>      21 Mar 2021 06:46  Phos  6.8     03-21  Mg     2.0     03-21      CHRISTIANO ELIZABETH      PT/INR - ( 19 Mar 2021 17:09 )   PT: 12.8 sec;   INR: 1.11 ratio         PTT - ( 19 Mar 2021 17:09 )  PTT:36.0 sec      RADIOLOGY & ADDITIONAL STUDIES:    INTERPRETATION OF TELEMETRY (personally reviewed):    ECG:    ECHO:    STRESS TEST:    CARDIAC CATHETERIZATION:    ASSESSMENT AND PLAN:  In summary, CHRISTIANO ELIZABETH is an 86y Male with past medical history significant for ESRD/TAVR/Leadless pacer/good LV/AF ( not on a/c due to GI bleeding ) and mod MR now POD 1 after L fem-pop w/ LE pain at site of surgery.  No CHF/CP-cont current tx.    Thank you for allowing [facility] to participate in the care of this patient.  Please feel free to call with any questions or concerns.

## 2021-03-21 NOTE — PROGRESS NOTE ADULT - SUBJECTIVE AND OBJECTIVE BOX
INTERVAL HPI/OVERNIGHT EVENTS:    SUBJECTIVE: Patient evaluated at bedside, found resting comfortably in bed, nad. No acute complaints or concerns. LLE with dressings in place c/d/i. Pain well controlled postoperatively. Continues to have duskiness of left toes. Denies sob, chest pain, n/v, f/c.       MEDICATIONS  (STANDING):  acetaminophen   Tablet .. 975 milliGRAM(s) Oral every 6 hours  atorvastatin 80 milliGRAM(s) Oral at bedtime  chlorhexidine 4% Liquid 1 Application(s) Topical <User Schedule>  clopidogrel Tablet 75 milliGRAM(s) Oral daily  DULoxetine 60 milliGRAM(s) Oral daily  epoetin juan-epbx (RETACRIT) Injectable 41987 Unit(s) IV Push <User Schedule>  gabapentin 100 milliGRAM(s) Oral two times a day  heparin   Injectable 5000 Unit(s) SubCutaneous every 8 hours  hydrALAZINE 50 milliGRAM(s) Oral two times a day  isosorbide   mononitrate ER Tablet (IMDUR) 30 milliGRAM(s) Oral daily  Nephro-pito 1 Tablet(s) Oral daily  NIFEdipine XL 90 milliGRAM(s) Oral daily  NIFEdipine XL 60 milliGRAM(s) Oral <User Schedule>  pantoprazole    Tablet 40 milliGRAM(s) Oral every 12 hours  torsemide 20 milliGRAM(s) Oral <User Schedule>    MEDICATIONS  (PRN):  oxyCODONE    IR 5 milliGRAM(s) Oral every 6 hours PRN Moderate Pain (4 - 6)  oxyCODONE    IR 10 milliGRAM(s) Oral every 6 hours PRN Severe Pain (7 - 10)      Vital Signs Last 24 Hrs  T(C): 36.9 (20 Mar 2021 22:31), Max: 37.8 (20 Mar 2021 11:22)  T(F): 98.4 (20 Mar 2021 22:31), Max: 100.1 (20 Mar 2021 11:22)  HR: 77 (20 Mar 2021 22:31) (56 - 77)  BP: 148/53 (20 Mar 2021 22:31) (120/70 - 148/53)  BP(mean): --  RR: 16 (20 Mar 2021 22:31) (12 - 18)  SpO2: 96% (20 Mar 2021 22:31) (92% - 98%)    PE  Gen: resting comfortably in bed, nad  Pulm: no increased wob, no conversational dyspnea  Abd: non-distended, soft, non-tender  Ext: LLE with dressings in place c/d/i. L foot with monophasic AT, dusky discoloration to distal toes, stable from prior.         I&O's Detail    19 Mar 2021 07:01  -  20 Mar 2021 07:00  --------------------------------------------------------  IN:    Other (mL): 800 mL  Total IN: 800 mL    OUT:    Other (mL): 1800 mL  Total OUT: 1800 mL    Total NET: -1000 mL          LABS:                        10.8   6.22  )-----------( 208      ( 20 Mar 2021 06:27 )             35.7     03-20    137  |  96<L>  |  33.0<H>  ----------------------------<  90  4.0   |  28.0  |  4.05<H>    Ca    9.0      20 Mar 2021 06:27  Phos  3.4     03-19  Mg     2.0     03-19      PT/INR - ( 19 Mar 2021 17:09 )   PT: 12.8 sec;   INR: 1.11 ratio         PTT - ( 19 Mar 2021 17:09 )  PTT:36.0 sec      RADIOLOGY & ADDITIONAL STUDIES:

## 2021-03-21 NOTE — PROGRESS NOTE ADULT - SUBJECTIVE AND OBJECTIVE BOX
[facility] DIVISION OF KIDNEY DISEASES AND HYPERTENSION -- HEMODIALYSIS NOTE  --------------------------------------------------------------------------------  Chief Complaint: ESRD/Ongoing hemodialysis requirement    24 hour events/subjective:  pt seen and examined; feels well        PAST HISTORY  --------------------------------------------------------------------------------  No significant changes to PMH, PSH, FHx, SHx, unless otherwise noted    ALLERGIES & MEDICATIONS  --------------------------------------------------------------------------------  Allergies    Plavix (Hives)  Toprol-XL (Rash)    Intolerances      Standing Inpatient Medications  acetaminophen   Tablet .. 975 milliGRAM(s) Oral every 6 hours  atorvastatin 80 milliGRAM(s) Oral at bedtime  chlorhexidine 4% Liquid 1 Application(s) Topical <User Schedule>  clopidogrel Tablet 75 milliGRAM(s) Oral daily  DULoxetine 60 milliGRAM(s) Oral daily  epoetin juan-epbx (RETACRIT) Injectable 80834 Unit(s) IV Push <User Schedule>  gabapentin 100 milliGRAM(s) Oral two times a day  heparin   Injectable 5000 Unit(s) SubCutaneous every 8 hours  hydrALAZINE 50 milliGRAM(s) Oral two times a day  isosorbide   mononitrate ER Tablet (IMDUR) 30 milliGRAM(s) Oral daily  Nephro-pito 1 Tablet(s) Oral daily  NIFEdipine XL 90 milliGRAM(s) Oral daily  NIFEdipine XL 60 milliGRAM(s) Oral <User Schedule>  pantoprazole    Tablet 40 milliGRAM(s) Oral every 12 hours  torsemide 20 milliGRAM(s) Oral <User Schedule>    PRN Inpatient Medications  oxyCODONE    IR 10 milliGRAM(s) Oral every 6 hours PRN  oxyCODONE    IR 5 milliGRAM(s) Oral every 6 hours PRN      REVIEW OF SYSTEMS  --------------------------------------------------------------------------------  Gen: No weight changes, fatigue, fevers/chills, weakness  Skin: No rashes  Head/Eyes/Ears/Mouth: No headache; Normal hearing; Normal vision w/o blurriness; No sinus pain/discomfort, sore throat  Respiratory: No dyspnea, cough, wheezing, hemoptysis  CV: No chest pain, PND, orthopnea  GI: No abdominal pain, diarrhea, constipation, nausea, vomiting, melena, hematochezia  : No increased frequency, dysuria, hematuria, nocturia  MSK: No joint pain/swelling; no back pain; no edema  Neuro: No dizziness/lightheadedness, weakness, seizures, numbness, tingling  Heme: No easy bruising or bleeding  Endo: No heat/cold intolerance  Psych: No significant nervousness, anxiety, stress, depression    All other systems were reviewed and are negative, except as noted.    VITALS/PHYSICAL EXAM  --------------------------------------------------------------------------------  T(C): 36.7 (03-21-21 @ 07:41), Max: 36.9 (03-20-21 @ 22:31)  HR: 88 (03-21-21 @ 07:41) (59 - 88)  BP: 156/61 (03-21-21 @ 07:41) (138/54 - 156/61)  RR: 18 (03-21-21 @ 07:41) (16 - 18)  SpO2: 96% (03-21-21 @ 07:41) (92% - 96%)  Wt(kg): --        Physical Exam:  	Gen: NAD  	HEENT: , supple neck, clear oropharynx  	Pulm: faint crackles+  	CV: RRR, S1S2; no rub  	Back:  no sacral edema  	Abd: +BS, soft, nontender/nondistended  	: No suprapubic tenderness  	UE: Warm, ; no edema  	LE: Warm,  no edema  	Neuro: No focal deficits  	Psych: Normal affect and mood  	Skin: Warm, without rashes  	Vascular access: BARBRA Trevizo    LABS/STUDIES  --------------------------------------------------------------------------------              8.5    9.84  >-----------<  229      [03-21-21 @ 06:46]              28.8     136  |  96  |  43.0  ----------------------------<  161      [03-21-21 @ 06:46]  4.6   |  22.0  |  5.49        Ca     8.0     [03-21-21 @ 06:46]      Mg     2.0     [03-21-21 @ 06:46]      Phos  6.8     [03-21-21 @ 06:46]      PT/INR: PT 12.8 , INR 1.11       [03-19-21 @ 17:09]  PTT: 36.0       [03-19-21 @ 17:09]      Iron 53, TIBC 266, %sat 20      [08-19-20 @ 06:32]  Ferritin 184      [08-19-20 @ 06:32]  PTH -- (Ca 9.6)      [08-19-20 @ 16:08]   91  Vitamin D (25OH) 26.4      [08-19-20 @ 16:08]  HbA1c 4.9      [08-09-18 @ 05:54]  TSH 2.16      [09-15-20 @ 02:01]

## 2021-03-22 LAB
ANION GAP SERPL CALC-SCNC: 16 MMOL/L — SIGNIFICANT CHANGE UP (ref 5–17)
ANION GAP SERPL CALC-SCNC: 20 MMOL/L — HIGH (ref 5–17)
APPEARANCE UR: CLEAR — SIGNIFICANT CHANGE UP
BASOPHILS # BLD AUTO: 0.04 K/UL — SIGNIFICANT CHANGE UP (ref 0–0.2)
BASOPHILS NFR BLD AUTO: 0.3 % — SIGNIFICANT CHANGE UP (ref 0–2)
BILIRUB UR-MCNC: ABNORMAL
BLD GP AB SCN SERPL QL: SIGNIFICANT CHANGE UP
BUN SERPL-MCNC: 26 MG/DL — HIGH (ref 8–20)
BUN SERPL-MCNC: 56 MG/DL — HIGH (ref 8–20)
CALCIUM SERPL-MCNC: 8.4 MG/DL — LOW (ref 8.6–10.2)
CALCIUM SERPL-MCNC: 8.6 MG/DL — SIGNIFICANT CHANGE UP (ref 8.6–10.2)
CHLORIDE SERPL-SCNC: 92 MMOL/L — LOW (ref 98–107)
CHLORIDE SERPL-SCNC: 92 MMOL/L — LOW (ref 98–107)
CO2 SERPL-SCNC: 22 MMOL/L — SIGNIFICANT CHANGE UP (ref 22–29)
CO2 SERPL-SCNC: 24 MMOL/L — SIGNIFICANT CHANGE UP (ref 22–29)
COLOR SPEC: YELLOW — SIGNIFICANT CHANGE UP
CREAT SERPL-MCNC: 3.53 MG/DL — HIGH (ref 0.5–1.3)
CREAT SERPL-MCNC: 6.6 MG/DL — HIGH (ref 0.5–1.3)
DIFF PNL FLD: ABNORMAL
EOSINOPHIL # BLD AUTO: 0.14 K/UL — SIGNIFICANT CHANGE UP (ref 0–0.5)
EOSINOPHIL NFR BLD AUTO: 1 % — SIGNIFICANT CHANGE UP (ref 0–6)
EPI CELLS # UR: SIGNIFICANT CHANGE UP
GAS PNL BLDA: SIGNIFICANT CHANGE UP
GLUCOSE BLDC GLUCOMTR-MCNC: 264 MG/DL — HIGH (ref 70–99)
GLUCOSE SERPL-MCNC: 183 MG/DL — HIGH (ref 70–99)
GLUCOSE SERPL-MCNC: 231 MG/DL — HIGH (ref 70–99)
GLUCOSE UR QL: 50 MG/DL
HCT VFR BLD CALC: 26.4 % — LOW (ref 39–50)
HCT VFR BLD CALC: 29.5 % — LOW (ref 39–50)
HGB BLD-MCNC: 7.9 G/DL — LOW (ref 13–17)
HGB BLD-MCNC: 9.3 G/DL — LOW (ref 13–17)
IMM GRANULOCYTES NFR BLD AUTO: 0.5 % — SIGNIFICANT CHANGE UP (ref 0–1.5)
KETONES UR-MCNC: ABNORMAL
LEUKOCYTE ESTERASE UR-ACNC: ABNORMAL
LYMPHOCYTES # BLD AUTO: 0.33 K/UL — LOW (ref 1–3.3)
LYMPHOCYTES # BLD AUTO: 2.4 % — LOW (ref 13–44)
MAGNESIUM SERPL-MCNC: 2 MG/DL — SIGNIFICANT CHANGE UP (ref 1.6–2.6)
MCHC RBC-ENTMCNC: 28.6 PG — SIGNIFICANT CHANGE UP (ref 27–34)
MCHC RBC-ENTMCNC: 28.6 PG — SIGNIFICANT CHANGE UP (ref 27–34)
MCHC RBC-ENTMCNC: 29.9 GM/DL — LOW (ref 32–36)
MCHC RBC-ENTMCNC: 31.5 GM/DL — LOW (ref 32–36)
MCV RBC AUTO: 90.8 FL — SIGNIFICANT CHANGE UP (ref 80–100)
MCV RBC AUTO: 95.7 FL — SIGNIFICANT CHANGE UP (ref 80–100)
MONOCYTES # BLD AUTO: 1.01 K/UL — HIGH (ref 0–0.9)
MONOCYTES NFR BLD AUTO: 7.2 % — SIGNIFICANT CHANGE UP (ref 2–14)
NEUTROPHILS # BLD AUTO: 12.39 K/UL — HIGH (ref 1.8–7.4)
NEUTROPHILS NFR BLD AUTO: 88.6 % — HIGH (ref 43–77)
NITRITE UR-MCNC: NEGATIVE — SIGNIFICANT CHANGE UP
PH UR: 5 — SIGNIFICANT CHANGE UP (ref 5–8)
PHOSPHATE SERPL-MCNC: 7.8 MG/DL — HIGH (ref 2.4–4.7)
PLATELET # BLD AUTO: 226 K/UL — SIGNIFICANT CHANGE UP (ref 150–400)
PLATELET # BLD AUTO: 234 K/UL — SIGNIFICANT CHANGE UP (ref 150–400)
POTASSIUM SERPL-MCNC: 4.3 MMOL/L — SIGNIFICANT CHANGE UP (ref 3.5–5.3)
POTASSIUM SERPL-MCNC: 5 MMOL/L — SIGNIFICANT CHANGE UP (ref 3.5–5.3)
POTASSIUM SERPL-SCNC: 4.3 MMOL/L — SIGNIFICANT CHANGE UP (ref 3.5–5.3)
POTASSIUM SERPL-SCNC: 5 MMOL/L — SIGNIFICANT CHANGE UP (ref 3.5–5.3)
PROT UR-MCNC: 100 MG/DL
RBC # BLD: 2.76 M/UL — LOW (ref 4.2–5.8)
RBC # BLD: 3.25 M/UL — LOW (ref 4.2–5.8)
RBC # FLD: 18.7 % — HIGH (ref 10.3–14.5)
RBC # FLD: 19 % — HIGH (ref 10.3–14.5)
RBC CASTS # UR COMP ASSIST: SIGNIFICANT CHANGE UP /HPF (ref 0–4)
SODIUM SERPL-SCNC: 132 MMOL/L — LOW (ref 135–145)
SODIUM SERPL-SCNC: 134 MMOL/L — LOW (ref 135–145)
SP GR SPEC: 1.02 — SIGNIFICANT CHANGE UP (ref 1.01–1.02)
UROBILINOGEN FLD QL: NEGATIVE MG/DL — SIGNIFICANT CHANGE UP
WBC # BLD: 13.98 K/UL — HIGH (ref 3.8–10.5)
WBC # BLD: 15.75 K/UL — HIGH (ref 3.8–10.5)
WBC # FLD AUTO: 13.98 K/UL — HIGH (ref 3.8–10.5)
WBC # FLD AUTO: 15.75 K/UL — HIGH (ref 3.8–10.5)
WBC UR QL: SIGNIFICANT CHANGE UP

## 2021-03-22 PROCEDURE — 70450 CT HEAD/BRAIN W/O DYE: CPT | Mod: 26

## 2021-03-22 PROCEDURE — 90937 HEMODIALYSIS REPEATED EVAL: CPT

## 2021-03-22 PROCEDURE — 71045 X-RAY EXAM CHEST 1 VIEW: CPT | Mod: 26

## 2021-03-22 PROCEDURE — 71275 CT ANGIOGRAPHY CHEST: CPT | Mod: 26

## 2021-03-22 PROCEDURE — 93010 ELECTROCARDIOGRAM REPORT: CPT

## 2021-03-22 RX ORDER — VANCOMYCIN HCL 1 G
1000 VIAL (EA) INTRAVENOUS ONCE
Refills: 0 | Status: COMPLETED | OUTPATIENT
Start: 2021-03-22 | End: 2021-03-22

## 2021-03-22 RX ORDER — CEFEPIME 1 G/1
2000 INJECTION, POWDER, FOR SOLUTION INTRAMUSCULAR; INTRAVENOUS
Refills: 0 | Status: DISCONTINUED | OUTPATIENT
Start: 2021-03-22 | End: 2021-03-25

## 2021-03-22 RX ORDER — TRAMADOL HYDROCHLORIDE 50 MG/1
25 TABLET ORAL EVERY 6 HOURS
Refills: 0 | Status: DISCONTINUED | OUTPATIENT
Start: 2021-03-22 | End: 2021-03-22

## 2021-03-22 RX ORDER — SEVELAMER CARBONATE 2400 MG/1
1600 POWDER, FOR SUSPENSION ORAL
Refills: 0 | Status: DISCONTINUED | OUTPATIENT
Start: 2021-03-22 | End: 2021-03-25

## 2021-03-22 RX ORDER — TIOTROPIUM BROMIDE 18 UG/1
1 CAPSULE ORAL; RESPIRATORY (INHALATION) DAILY
Refills: 0 | Status: DISCONTINUED | OUTPATIENT
Start: 2021-03-22 | End: 2021-03-25

## 2021-03-22 RX ORDER — GABAPENTIN 400 MG/1
100 CAPSULE ORAL EVERY 8 HOURS
Refills: 0 | Status: DISCONTINUED | OUTPATIENT
Start: 2021-03-22 | End: 2021-03-22

## 2021-03-22 RX ORDER — ALBUTEROL 90 UG/1
1 AEROSOL, METERED ORAL EVERY 4 HOURS
Refills: 0 | Status: DISCONTINUED | OUTPATIENT
Start: 2021-03-22 | End: 2021-03-23

## 2021-03-22 RX ORDER — ACETAMINOPHEN 500 MG
1000 TABLET ORAL ONCE
Refills: 0 | Status: DISCONTINUED | OUTPATIENT
Start: 2021-03-22 | End: 2021-03-22

## 2021-03-22 RX ORDER — DESMOPRESSIN ACETATE 0.1 MG/1
0.1 TABLET ORAL ONCE
Refills: 0 | Status: COMPLETED | OUTPATIENT
Start: 2021-03-22 | End: 2021-03-22

## 2021-03-22 RX ORDER — KETOROLAC TROMETHAMINE 30 MG/ML
15 SYRINGE (ML) INJECTION EVERY 6 HOURS
Refills: 0 | Status: DISCONTINUED | OUTPATIENT
Start: 2021-03-22 | End: 2021-03-22

## 2021-03-22 RX ORDER — IPRATROPIUM/ALBUTEROL SULFATE 18-103MCG
3 AEROSOL WITH ADAPTER (GRAM) INHALATION EVERY 6 HOURS
Refills: 0 | Status: DISCONTINUED | OUTPATIENT
Start: 2021-03-22 | End: 2021-03-23

## 2021-03-22 RX ORDER — TRAMADOL HYDROCHLORIDE 50 MG/1
50 TABLET ORAL EVERY 6 HOURS
Refills: 0 | Status: DISCONTINUED | OUTPATIENT
Start: 2021-03-22 | End: 2021-03-22

## 2021-03-22 RX ORDER — METRONIDAZOLE 500 MG
500 TABLET ORAL EVERY 8 HOURS
Refills: 0 | Status: DISCONTINUED | OUTPATIENT
Start: 2021-03-22 | End: 2021-03-25

## 2021-03-22 RX ADMIN — HEPARIN SODIUM 5000 UNIT(S): 5000 INJECTION INTRAVENOUS; SUBCUTANEOUS at 18:41

## 2021-03-22 RX ADMIN — HEPARIN SODIUM 5000 UNIT(S): 5000 INJECTION INTRAVENOUS; SUBCUTANEOUS at 05:21

## 2021-03-22 RX ADMIN — Medication 1 TABLET(S): at 18:25

## 2021-03-22 RX ADMIN — Medication 90 MILLIGRAM(S): at 18:24

## 2021-03-22 RX ADMIN — SEVELAMER CARBONATE 1600 MILLIGRAM(S): 2400 POWDER, FOR SUSPENSION ORAL at 18:25

## 2021-03-22 RX ADMIN — CHLORHEXIDINE GLUCONATE 1 APPLICATION(S): 213 SOLUTION TOPICAL at 05:17

## 2021-03-22 RX ADMIN — Medication 975 MILLIGRAM(S): at 00:15

## 2021-03-22 RX ADMIN — HEPARIN SODIUM 5000 UNIT(S): 5000 INJECTION INTRAVENOUS; SUBCUTANEOUS at 21:57

## 2021-03-22 RX ADMIN — ERYTHROPOIETIN 12000 UNIT(S): 10000 INJECTION, SOLUTION INTRAVENOUS; SUBCUTANEOUS at 13:45

## 2021-03-22 RX ADMIN — DULOXETINE HYDROCHLORIDE 60 MILLIGRAM(S): 30 CAPSULE, DELAYED RELEASE ORAL at 18:24

## 2021-03-22 RX ADMIN — DESMOPRESSIN ACETATE 0.1 MILLIGRAM(S): 0.1 TABLET ORAL at 10:43

## 2021-03-22 RX ADMIN — PANTOPRAZOLE SODIUM 40 MILLIGRAM(S): 20 TABLET, DELAYED RELEASE ORAL at 10:46

## 2021-03-22 RX ADMIN — Medication 500 MILLIGRAM(S): at 21:57

## 2021-03-22 RX ADMIN — PANTOPRAZOLE SODIUM 40 MILLIGRAM(S): 20 TABLET, DELAYED RELEASE ORAL at 21:57

## 2021-03-22 RX ADMIN — GABAPENTIN 100 MILLIGRAM(S): 400 CAPSULE ORAL at 05:21

## 2021-03-22 RX ADMIN — CEFEPIME 100 MILLIGRAM(S): 1 INJECTION, POWDER, FOR SOLUTION INTRAMUSCULAR; INTRAVENOUS at 21:57

## 2021-03-22 RX ADMIN — OXYCODONE HYDROCHLORIDE 5 MILLIGRAM(S): 5 TABLET ORAL at 02:17

## 2021-03-22 RX ADMIN — Medication 975 MILLIGRAM(S): at 05:21

## 2021-03-22 RX ADMIN — Medication 975 MILLIGRAM(S): at 18:24

## 2021-03-22 RX ADMIN — OXYCODONE HYDROCHLORIDE 5 MILLIGRAM(S): 5 TABLET ORAL at 01:14

## 2021-03-22 RX ADMIN — Medication 50 MILLIGRAM(S): at 18:26

## 2021-03-22 RX ADMIN — Medication 975 MILLIGRAM(S): at 06:58

## 2021-03-22 RX ADMIN — ISOSORBIDE MONONITRATE 30 MILLIGRAM(S): 60 TABLET, EXTENDED RELEASE ORAL at 18:25

## 2021-03-22 RX ADMIN — Medication 250 MILLIGRAM(S): at 21:57

## 2021-03-22 RX ADMIN — CLOPIDOGREL BISULFATE 75 MILLIGRAM(S): 75 TABLET, FILM COATED ORAL at 18:24

## 2021-03-22 RX ADMIN — ATORVASTATIN CALCIUM 80 MILLIGRAM(S): 80 TABLET, FILM COATED ORAL at 21:57

## 2021-03-22 RX ADMIN — CHLORHEXIDINE GLUCONATE 1 APPLICATION(S): 213 SOLUTION TOPICAL at 21:58

## 2021-03-22 NOTE — PHYSICAL THERAPY INITIAL EVALUATION ADULT - ADDITIONAL COMMENTS
per patient he lives with his wife, family lives near by. Pt reports that someone in the family drives him to his appointments. He has ~3 steps to enter the home with a single hand rail. Pt uses a RW at baseline.

## 2021-03-22 NOTE — PHYSICAL THERAPY INITIAL EVALUATION ADULT - RANGE OF MOTION EXAMINATION, REHAB EVAL
no ROM deficits were identified
no ROM deficits were identified
PROM WFL, increased guarding with left knee ROM due to pain/no ROM deficits were identified

## 2021-03-22 NOTE — PROGRESS NOTE ADULT - ASSESSMENT
Assessmnet  postop LLE bypass  s/p TAVR mild MS MS normal EF  PAF in SR  DDD pacer  ESRD on HD    Rec  cont current meds

## 2021-03-22 NOTE — PROGRESS NOTE ADULT - SUBJECTIVE AND OBJECTIVE BOX
[facility] DIVISION OF KIDNEY DISEASES AND HYPERTENSION -- HEMODIALYSIS NOTE  --------------------------------------------------------------------------------  Chief Complaint: ESRD/Ongoing hemodialysis requirement    24 hour events/subjective: S/P Vascular [facility],    PAST HISTORY  --------------------------------------------------------------------------------  No significant changes to PMH, PSH, FHx, SHx, unless otherwise noted    ALLERGIES & MEDICATIONS  --------------------------------------------------------------------------------  Allergies    Plavix (Hives)  Toprol-XL (Rash)    Standing Inpatient Medications  acetaminophen   Tablet .. 975 milliGRAM(s) Oral every 6 hours  atorvastatin 80 milliGRAM(s) Oral at bedtime  chlorhexidine 4% Liquid 1 Application(s) Topical <User Schedule>  clopidogrel Tablet 75 milliGRAM(s) Oral daily  DULoxetine 60 milliGRAM(s) Oral daily  epoetin juan-epbx (RETACRIT) Injectable 20915 Unit(s) IV Push <User Schedule>  heparin   Injectable 5000 Unit(s) SubCutaneous every 8 hours  hydrALAZINE 50 milliGRAM(s) Oral two times a day  isosorbide   mononitrate ER Tablet (IMDUR) 30 milliGRAM(s) Oral daily  Nephro-pito 1 Tablet(s) Oral daily  NIFEdipine XL 90 milliGRAM(s) Oral daily  NIFEdipine XL 60 milliGRAM(s) Oral <User Schedule>  pantoprazole    Tablet 40 milliGRAM(s) Oral every 12 hours  sevelamer carbonate 1600 milliGRAM(s) Oral three times a day with meals  torsemide 20 milliGRAM(s) Oral <User Schedule>    REVIEW OF SYSTEMS  --------------------------------------------------------------------------------  Gen: No weight changes, fatigue, fevers/chills, weakness  Skin: No rashes  Head/Eyes/Ears/Mouth: No headache; Normal hearing; Normal vision w/o blurriness; No sinus pain/discomfort, sore throat  Respiratory: No dyspnea, cough, wheezing, hemoptysis  CV: No chest pain, PND, orthopnea  GI: No abdominal pain, diarrhea, constipation, nausea, vomiting, melena, hematochezia  : No increased frequency, dysuria, hematuria, nocturia  MSK: No joint pain/swelling; no back pain; no edema  Neuro: No dizziness/lightheadedness, weakness, seizures, numbness, tingling  Heme: No easy bruising or bleeding  Endo: No heat/cold intolerance  Psych: No significant nervousness, anxiety, stress, depression    All other systems were reviewed and are negative, except as noted.    VITALS/PHYSICAL EXAM  --------------------------------------------------------------------------------  T(C): 37.3 (03-22-21 @ 13:13), Max: 38.3 (03-22-21 @ 01:05)  HR: 59 (03-22-21 @ 13:13) (56 - 71)  BP: 159/55 (03-22-21 @ 13:13) (148/47 - 165/65)  RR: 18 (03-22-21 @ 13:13) (18 - 20)  SpO2: 96% (03-22-21 @ 13:13) (94% - 96%)    03-21-21 @ 07:01  -  03-22-21 @ 07:00  --------------------------------------------------------  IN: 120 mL / OUT: 0 mL / NET: 120 mL    Physical Exam:  	Gen: NAD, well-appearing, Pale,   	HEENT: PERRL, supple neck, clear oropharynx  	Pulm: CTA B/L  	CV: RRR, S1S2; no rub  	Back: No spinal or CVA tenderness; no sacral edema  	Abd: +BS, soft, nontender/nondistended  	: No suprapubic tenderness  	UE: Warm, FROM, no clubbing, intact strength; no edema; no asterixis  	LE: Warm, FROM, no clubbing, intact strength; no edema  	Neuro: No focal deficits, intact gait ( W. Assistance )  	Psych: Normal affect and mood  	Skin: Warm, without rashes  	Vascular access: AVF,     LABS/STUDIES  --------------------------------------------------------------------------------              7.9    13.98 >-----------<  226      [03-22-21 @ 06:11]              26.4     134  |  92  |  56.0  ----------------------------<  183      [03-22-21 @ 06:11]  5.0   |  22.0  |  6.60        Ca     8.4     [03-22-21 @ 06:11]      Mg     2.0     [03-22-21 @ 06:11]      Phos  7.8     [03-22-21 @ 06:11]    Iron 53, TIBC 266, %sat 20      [08-19-20 @ 06:32]  Ferritin 184      [08-19-20 @ 06:32]  PTH -- (Ca 9.6)      [08-19-20 @ 16:08]   91  Vitamin D (25OH) 26.4      [08-19-20 @ 16:08]  HbA1c 4.9      [08-09-18 @ 05:54]  TSH 2.16      [09-15-20 @ 02:01]

## 2021-03-22 NOTE — DIETITIAN NUTRITION RISK NOTIFICATION - TREATMENT: THE FOLLOWING DIET HAS BEEN RECOMMENDED
Diet, Renal Restrictions:   For patients receiving Renal Replacement - No Protein Restr, No Conc K, No Conc Phos, Low Sodium  DASH/TLC {Sodium & Cholesterol Restricted} (DASH)  Supplement Feeding Modality:  Oral  Nepro Cans or Servings Per Day:  1       Frequency:  Three Times a day (03-16-21 @ 08:19) [Active]      Continue with Nepro tid
Diet, Renal Restrictions:   For patients receiving Renal Replacement - No Protein Restr, No Conc K, No Conc Phos, Low Sodium  DASH/TLC {Sodium & Cholesterol Restricted} (DASH)  Supplement Feeding Modality:  Oral  Nepro Cans or Servings Per Day:  1       Frequency:  Three Times a day (03-16-21 @ 08:19) [Active]    Continue with Nepro tid

## 2021-03-22 NOTE — PROGRESS NOTE ADULT - SUBJECTIVE AND OBJECTIVE BOX
INTERVAL HPI/OVERNIGHT EVENTS:    SUBJECTIVE: Patient evaluated at bedside, found resting comfortably in bed, nad. Patient with delirium over night, able to be redirected. Also noted to be oozing from surgical incisions with mild drift of Hgb, will plan for transfusion of 2U PRBC with HD today. Denies pain at present. Stable vascular exam with monophasic AT signal at L foot.        MEDICATIONS  (STANDING):  acetaminophen   Tablet .. 975 milliGRAM(s) Oral every 6 hours  atorvastatin 80 milliGRAM(s) Oral at bedtime  chlorhexidine 4% Liquid 1 Application(s) Topical <User Schedule>  clopidogrel Tablet 75 milliGRAM(s) Oral daily  desmopressin 0.1 milliGRAM(s) Oral once  DULoxetine 60 milliGRAM(s) Oral daily  epoetin juan-epbx (RETACRIT) Injectable 45485 Unit(s) IV Push <User Schedule>  heparin   Injectable 5000 Unit(s) SubCutaneous every 8 hours  hydrALAZINE 50 milliGRAM(s) Oral two times a day  isosorbide   mononitrate ER Tablet (IMDUR) 30 milliGRAM(s) Oral daily  Nephro-pito 1 Tablet(s) Oral daily  NIFEdipine XL 90 milliGRAM(s) Oral daily  NIFEdipine XL 60 milliGRAM(s) Oral <User Schedule>  pantoprazole    Tablet 40 milliGRAM(s) Oral every 12 hours  torsemide 20 milliGRAM(s) Oral <User Schedule>    MEDICATIONS  (PRN):      Vital Signs Last 24 Hrs  T(C): 37.8 (22 Mar 2021 05:08), Max: 38.3 (22 Mar 2021 01:05)  T(F): 100.1 (22 Mar 2021 05:08), Max: 100.9 (22 Mar 2021 01:05)  HR: 60 (22 Mar 2021 05:08) (56 - 88)  BP: 162/58 (22 Mar 2021 05:08) (140/61 - 162/58)  BP(mean): --  RR: 20 (22 Mar 2021 05:08) (18 - 20)  SpO2: 94% (22 Mar 2021 05:08) (94% - 98%)    PE  Gen: resting comfortably in bed, nad, confused  Pulm: no increased wob, no conversational dyspnea  Abd: non-distended, soft, non-tender  Ext: left groin notable for hematoma and ecchymosis but remains soft, L groin incision with prevena removed, incision c/d/i, Island dressing placed at groin incision. L LLE incision with mild oozing from incisional site. Calf mildly tense to palpation. L AT monophasic signals. Stable dusky discoloration of distal L toes.       I&O's Detail    21 Mar 2021 07:01  -  22 Mar 2021 07:00  --------------------------------------------------------  IN:    Oral Fluid: 120 mL  Total IN: 120 mL    OUT:  Total OUT: 0 mL    Total NET: 120 mL          LABS:                        7.9    13.98 )-----------( 226      ( 22 Mar 2021 06:11 )             26.4     03-22    134<L>  |  92<L>  |  56.0<H>  ----------------------------<  183<H>  5.0   |  22.0  |  6.60<H>    Ca    8.4<L>      22 Mar 2021 06:11  Phos  7.8     03-22  Mg     2.0     03-22

## 2021-03-22 NOTE — DIETITIAN NUTRITION RISK NOTIFICATION - MALNUTRITION EVALUATION AS DEMONSTRATED BY (ADULTS > 20 YEARS OF AGE)
Inadequate energy intake.../Loss of muscle...
Inadequate energy intake.../Loss of subcutaneous fat.../Loss of muscle...

## 2021-03-22 NOTE — PROGRESS NOTE ADULT - ASSESSMENT
86y Male presenting with severe anemia presenting with rest pain of LLE. Severe plaque burden of LLE on angiogram, unable to cross with wire. Now s/p L CFA to below knee pop bypass w PTFE graft. Monophasic L AT postoperatively. Mild oozing at incision with Left groin hematoma noted overnight. Now with drop in hemoglobin, will plan for transfusion with HD today.     PLAN:    -OOB  -oral pain control as needed, all narcotic medications stopped due to delirium  -continue Plavix  -Q4hr vascular exams  - will give 1 dose of DDAVP today  -f/u PT  -dressings changed this am  -2U PRBC with HD today, will obtain consent from Daughter Vanessa Clemens

## 2021-03-22 NOTE — PROGRESS NOTE ADULT - ASSESSMENT
EC Lead ECG (21 @ 14:43)     Diagnosis Line Atrial-sensed ventricular-paced rhythm with Premature supraventricular complexes  Abnormal ECG      ECHO: TTE Echo Complete w/o Contrast w/ Doppler (21 @ 11:36)   Summary:   1. Moderately enlarged left atrium.   2. There is mild concentric left ventricular hypertrophy.   3. Left ventricular ejection fraction, by visual estimation, is 60 to 65%.   4. Grade II diastolic dysfunction. Elevated mean left atrial pressure.   5. Mildly enlarged right atrium.   6. Mildly enlarged right ventricle. Mildly reduced RV systolic function.   7. Mild mitral stenosis. Moderate mitral valve regurgitation. Elevated mean gradient likely due to volume overload. Repeat study after diuresis to erevaluate mitral valve. If clinically indicated.   8. 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 Well seated valve. Acceptable gradients. No regurgitation.   9. Mild tricuspid regurgitation.  10. Estimated pulmonary artery systolic pressure is 78 mmHg - severe pulmonary hypertension.  11. There is no evidence of pericardial effusion.    Cici Krishnamurthy MD, RPVI Electronically signed on 3/14/2021 at 6:02:57 PM    Assessment :     postop LLE bypass  s/p TAVR mild MS MS normal EF  PAF in SR  DDD pacer  ESRD on HD    Rec:    cont current meds, Transfuse 1 unit - PRBC, On AMY,     HD BIW,

## 2021-03-22 NOTE — PROGRESS NOTE ADULT - SUBJECTIVE AND OBJECTIVE BOX
[place] [facility] - [facility], THE HEART CENTER                                   [address] [place] [address]                                                      PHONE: [phone]                                                         FAX: [phone]  [email or web address]  ---------------------------------------------------------------------------------------------------------------------------------    Overnight events/patient complaints: healing well, no further limb pain, tele unremarkable      Plavix (Hives)  Toprol-XL (Rash)    MEDICATIONS  (STANDING):  acetaminophen   Tablet .. 975 milliGRAM(s) Oral every 6 hours  atorvastatin 80 milliGRAM(s) Oral at bedtime  chlorhexidine 4% Liquid 1 Application(s) Topical <User Schedule>  clopidogrel Tablet 75 milliGRAM(s) Oral daily  desmopressin 0.1 milliGRAM(s) Oral once  DULoxetine 60 milliGRAM(s) Oral daily  epoetin juan-epbx (RETACRIT) Injectable 46657 Unit(s) IV Push <User Schedule>  heparin   Injectable 5000 Unit(s) SubCutaneous every 8 hours  hydrALAZINE 50 milliGRAM(s) Oral two times a day  isosorbide   mononitrate ER Tablet (IMDUR) 30 milliGRAM(s) Oral daily  Nephro-pito 1 Tablet(s) Oral daily  NIFEdipine XL 90 milliGRAM(s) Oral daily  NIFEdipine XL 60 milliGRAM(s) Oral <User Schedule>  pantoprazole    Tablet 40 milliGRAM(s) Oral every 12 hours  torsemide 20 milliGRAM(s) Oral <User Schedule>    MEDICATIONS  (PRN):      Vital Signs Last 24 Hrs  T(C): 37.8 (22 Mar 2021 05:08), Max: 38.3 (22 Mar 2021 01:05)  T(F): 100.1 (22 Mar 2021 05:08), Max: 100.9 (22 Mar 2021 01:05)  HR: 60 (22 Mar 2021 05:08) (56 - 74)  BP: 162/58 (22 Mar 2021 05:08) (140/61 - 162/58)  BP(mean): --  RR: 20 (22 Mar 2021 05:08) (18 - 20)  SpO2: 94% (22 Mar 2021 05:08) (94% - 98%)  Daily     Daily Weight in k (22 Mar 2021 05:08)  ICU Vital Signs Last 24 Hrs  CHRISTIANO ELIZABETH  I&O's Detail    21 Mar 2021 07:01  -  22 Mar 2021 07:00  --------------------------------------------------------  IN:    Oral Fluid: 120 mL  Total IN: 120 mL    OUT:  Total OUT: 0 mL    Total NET: 120 mL        I&O's Summary    21 Mar 2021 07:01  -  22 Mar 2021 07:00  --------------------------------------------------------  IN: 120 mL / OUT: 0 mL / NET: 120 mL      Drug Dosing Weight  CHRISTIANO ELIZABETH      PHYSICAL EXAM:  General: Appears well developed, well nourished alert and cooperative.  HEENT: Head; normocephalic, atraumatic.  Eyes: Pupils reactive, cornea wnl.  Neck: Supple, no nodes adenopathy, no NVD or carotid bruit or thyromegaly.  CARDIOVASCULAR: Normal S1 and S2, No murmur, rub, gallop or lift.   LUNGS: No rales, rhonchi or wheeze. Normal breath sounds bilaterally.  ABDOMEN: Soft, nontender without mass or organomegaly. bowel sounds normoactive.  EXTREMITIES: No clubbing, cyanosis or edema. Distal pulses wnl.   SKIN: warm and dry with normal turgor.  NEURO: Alert/oriented x 3/normal motor exam. No pathologic reflexes.    PSYCH: normal affect.        LABS:                        7.9    13.98 )-----------( 226      ( 22 Mar 2021 06:11 )             26.4     03-22    134<L>  |  92<L>  |  56.0<H>  ----------------------------<  183<H>  5.0   |  22.0  |  6.60<H>    Ca    8.4<L>      22 Mar 2021 06:11  Phos  7.8       Mg     2.0     -      CHRISTIANO PROGanado            RADIOLOGY & ADDITIONAL STUDIES:    INTERPRETATION OF TELEMETRY (personally reviewed):    ECG:< from: 12 Lead ECG (21 @ 14:43) >  Diagnosis Line Atrial-sensed ventricular-paced rhythm with Premature supraventricular complexes  Abnormal ECG    Confirmed by SHAR VICTORIA ([id number]) on 3/21/2021 11:32:04 PM    < end of copied text >      ECHO:< from: TTE Echo Complete w/o Contrast w/ Doppler (21 @ 11:36) >  Summary:   1. Moderately enlarged left atrium.   2. There is mild concentric left ventricular hypertrophy.   3. Left ventricular ejection fraction, by visual estimation, is 60 to 65%.   4. Grade II diastolic dysfunction. Elevated mean left atrial pressure.   5. Mildly enlarged right atrium.   6. Mildly enlarged right ventricle. Mildly reduced RV systolic function.   7. Mild mitral stenosis. Moderate mitral valve regurgitation. Elevated mean gradient likely due to volume overload. Repeat study after diuresis to erevaluate mitral valve. If clinically indicated.   8. 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 Well seated valve. Acceptable gradients. No regurgitation.   9. Mild tricuspid regurgitation.  10. Estimated pulmonary artery systolic pressure is 78 mmHg - severe pulmonary hypertension.  11. There is no evidence of pericardial effusion.    MD Rohith, RPVI Electronically signed on 3/14/2021 at 6:02:57 PM          < end of copied t

## 2021-03-23 LAB
ANION GAP SERPL CALC-SCNC: 15 MMOL/L — SIGNIFICANT CHANGE UP (ref 5–17)
APTT BLD: 54.7 SEC — HIGH (ref 27.5–35.5)
APTT BLD: 60.9 SEC — HIGH (ref 27.5–35.5)
APTT BLD: 61 SEC — HIGH (ref 27.5–35.5)
BASOPHILS # BLD AUTO: 0.04 K/UL — SIGNIFICANT CHANGE UP (ref 0–0.2)
BASOPHILS NFR BLD AUTO: 0.3 % — SIGNIFICANT CHANGE UP (ref 0–2)
BLD GP AB SCN SERPL QL: SIGNIFICANT CHANGE UP
BUN SERPL-MCNC: 34 MG/DL — HIGH (ref 8–20)
CALCIUM SERPL-MCNC: 8.6 MG/DL — SIGNIFICANT CHANGE UP (ref 8.6–10.2)
CHLORIDE SERPL-SCNC: 91 MMOL/L — LOW (ref 98–107)
CO2 SERPL-SCNC: 27 MMOL/L — SIGNIFICANT CHANGE UP (ref 22–29)
CREAT SERPL-MCNC: 4.25 MG/DL — HIGH (ref 0.5–1.3)
EOSINOPHIL # BLD AUTO: 0.09 K/UL — SIGNIFICANT CHANGE UP (ref 0–0.5)
EOSINOPHIL NFR BLD AUTO: 0.7 % — SIGNIFICANT CHANGE UP (ref 0–6)
GLUCOSE SERPL-MCNC: 163 MG/DL — HIGH (ref 70–99)
HCT VFR BLD CALC: 28.4 % — LOW (ref 39–50)
HCT VFR BLD CALC: 29.1 % — LOW (ref 39–50)
HCT VFR BLD CALC: 30.2 % — LOW (ref 39–50)
HGB BLD-MCNC: 8.6 G/DL — LOW (ref 13–17)
HGB BLD-MCNC: 8.8 G/DL — LOW (ref 13–17)
HGB BLD-MCNC: 9.1 G/DL — LOW (ref 13–17)
IMM GRANULOCYTES NFR BLD AUTO: 1.2 % — SIGNIFICANT CHANGE UP (ref 0–1.5)
INR BLD: 1.33 RATIO — HIGH (ref 0.88–1.16)
LYMPHOCYTES # BLD AUTO: 0.36 K/UL — LOW (ref 1–3.3)
LYMPHOCYTES # BLD AUTO: 2.7 % — LOW (ref 13–44)
MAGNESIUM SERPL-MCNC: 2 MG/DL — SIGNIFICANT CHANGE UP (ref 1.6–2.6)
MCHC RBC-ENTMCNC: 27 PG — SIGNIFICANT CHANGE UP (ref 27–34)
MCHC RBC-ENTMCNC: 28.5 PG — SIGNIFICANT CHANGE UP (ref 27–34)
MCHC RBC-ENTMCNC: 29.6 GM/DL — LOW (ref 32–36)
MCHC RBC-ENTMCNC: 31 GM/DL — LOW (ref 32–36)
MCV RBC AUTO: 91.5 FL — SIGNIFICANT CHANGE UP (ref 80–100)
MCV RBC AUTO: 91.9 FL — SIGNIFICANT CHANGE UP (ref 80–100)
MONOCYTES # BLD AUTO: 1.15 K/UL — HIGH (ref 0–0.9)
MONOCYTES NFR BLD AUTO: 8.6 % — SIGNIFICANT CHANGE UP (ref 2–14)
NEUTROPHILS # BLD AUTO: 11.6 K/UL — HIGH (ref 1.8–7.4)
NEUTROPHILS NFR BLD AUTO: 86.5 % — HIGH (ref 43–77)
PHOSPHATE SERPL-MCNC: 5.8 MG/DL — HIGH (ref 2.4–4.7)
PLATELET # BLD AUTO: 237 K/UL — SIGNIFICANT CHANGE UP (ref 150–400)
PLATELET # BLD AUTO: 252 K/UL — SIGNIFICANT CHANGE UP (ref 150–400)
POTASSIUM SERPL-MCNC: 4.2 MMOL/L — SIGNIFICANT CHANGE UP (ref 3.5–5.3)
POTASSIUM SERPL-SCNC: 4.2 MMOL/L — SIGNIFICANT CHANGE UP (ref 3.5–5.3)
PROTHROM AB SERPL-ACNC: 15.2 SEC — HIGH (ref 10.6–13.6)
RBC # BLD: 3.09 M/UL — LOW (ref 4.2–5.8)
RBC # BLD: 3.18 M/UL — LOW (ref 4.2–5.8)
RBC # FLD: 18.8 % — HIGH (ref 10.3–14.5)
RBC # FLD: 19.2 % — HIGH (ref 10.3–14.5)
SODIUM SERPL-SCNC: 133 MMOL/L — LOW (ref 135–145)
VANCOMYCIN FLD-MCNC: 12.4 UG/ML — SIGNIFICANT CHANGE UP
WBC # BLD: 13.4 K/UL — HIGH (ref 3.8–10.5)
WBC # BLD: 14.45 K/UL — HIGH (ref 3.8–10.5)
WBC # FLD AUTO: 13.4 K/UL — HIGH (ref 3.8–10.5)
WBC # FLD AUTO: 14.45 K/UL — HIGH (ref 3.8–10.5)

## 2021-03-23 PROCEDURE — 93970 EXTREMITY STUDY: CPT | Mod: 26

## 2021-03-23 PROCEDURE — 99233 SBSQ HOSP IP/OBS HIGH 50: CPT

## 2021-03-23 RX ORDER — ACETAMINOPHEN 500 MG
1000 TABLET ORAL ONCE
Refills: 0 | Status: COMPLETED | OUTPATIENT
Start: 2021-03-23 | End: 2021-03-23

## 2021-03-23 RX ORDER — HEPARIN SODIUM 5000 [USP'U]/ML
INJECTION INTRAVENOUS; SUBCUTANEOUS
Qty: 25000 | Refills: 0 | Status: DISCONTINUED | OUTPATIENT
Start: 2021-03-23 | End: 2021-03-25

## 2021-03-23 RX ORDER — ERYTHROPOIETIN 10000 [IU]/ML
12000 INJECTION, SOLUTION INTRAVENOUS; SUBCUTANEOUS
Refills: 0 | Status: DISCONTINUED | OUTPATIENT
Start: 2021-03-23 | End: 2021-03-25

## 2021-03-23 RX ADMIN — CLOPIDOGREL BISULFATE 75 MILLIGRAM(S): 75 TABLET, FILM COATED ORAL at 09:24

## 2021-03-23 RX ADMIN — Medication 975 MILLIGRAM(S): at 12:34

## 2021-03-23 RX ADMIN — SEVELAMER CARBONATE 1600 MILLIGRAM(S): 2400 POWDER, FOR SUSPENSION ORAL at 12:34

## 2021-03-23 RX ADMIN — Medication 400 MILLIGRAM(S): at 14:32

## 2021-03-23 RX ADMIN — PANTOPRAZOLE SODIUM 40 MILLIGRAM(S): 20 TABLET, DELAYED RELEASE ORAL at 09:24

## 2021-03-23 RX ADMIN — HEPARIN SODIUM 1200 UNIT(S)/HR: 5000 INJECTION INTRAVENOUS; SUBCUTANEOUS at 07:40

## 2021-03-23 RX ADMIN — Medication 1 TABLET(S): at 09:24

## 2021-03-23 RX ADMIN — Medication 975 MILLIGRAM(S): at 06:10

## 2021-03-23 RX ADMIN — PANTOPRAZOLE SODIUM 40 MILLIGRAM(S): 20 TABLET, DELAYED RELEASE ORAL at 21:12

## 2021-03-23 RX ADMIN — DULOXETINE HYDROCHLORIDE 60 MILLIGRAM(S): 30 CAPSULE, DELAYED RELEASE ORAL at 09:24

## 2021-03-23 RX ADMIN — Medication 50 MILLIGRAM(S): at 17:56

## 2021-03-23 RX ADMIN — HEPARIN SODIUM 1200 UNIT(S)/HR: 5000 INJECTION INTRAVENOUS; SUBCUTANEOUS at 16:25

## 2021-03-23 RX ADMIN — Medication 50 MILLIGRAM(S): at 06:10

## 2021-03-23 RX ADMIN — Medication 500 MILLIGRAM(S): at 14:44

## 2021-03-23 RX ADMIN — ATORVASTATIN CALCIUM 80 MILLIGRAM(S): 80 TABLET, FILM COATED ORAL at 21:12

## 2021-03-23 RX ADMIN — SEVELAMER CARBONATE 1600 MILLIGRAM(S): 2400 POWDER, FOR SUSPENSION ORAL at 09:23

## 2021-03-23 RX ADMIN — Medication 500 MILLIGRAM(S): at 06:10

## 2021-03-23 RX ADMIN — Medication 60 MILLIGRAM(S): at 21:11

## 2021-03-23 RX ADMIN — Medication 500 MILLIGRAM(S): at 21:12

## 2021-03-23 RX ADMIN — HEPARIN SODIUM 1200 UNIT(S)/HR: 5000 INJECTION INTRAVENOUS; SUBCUTANEOUS at 01:31

## 2021-03-23 RX ADMIN — ISOSORBIDE MONONITRATE 30 MILLIGRAM(S): 60 TABLET, EXTENDED RELEASE ORAL at 12:34

## 2021-03-23 RX ADMIN — SEVELAMER CARBONATE 1600 MILLIGRAM(S): 2400 POWDER, FOR SUSPENSION ORAL at 17:56

## 2021-03-23 RX ADMIN — Medication 975 MILLIGRAM(S): at 17:56

## 2021-03-23 RX ADMIN — Medication 20 MILLIGRAM(S): at 12:34

## 2021-03-23 NOTE — PROGRESS NOTE ADULT - SUBJECTIVE AND OBJECTIVE BOX
[place] [facility], THE HEART CENTER                                   [address] [place] [address]                                                      PHONE: [phone]                                                         FAX: [phone]  [email or web address]  ---------------------------------------------------------------------------------------------------------------------------------    Overnight events/patient complaints: events noted on IV heparin for DVT      Plavix (Hives)  Toprol-XL (Rash)    MEDICATIONS  (STANDING):  acetaminophen   Tablet .. 975 milliGRAM(s) Oral every 6 hours  ALBUTerol    90 MICROgram(s) HFA Inhaler 1 Puff(s) Inhalation every 4 hours  albuterol/ipratropium for Nebulization 3 milliLiter(s) Nebulizer every 6 hours  atorvastatin 80 milliGRAM(s) Oral at bedtime  cefepime   IVPB 2000 milliGRAM(s) IV Intermittent <User Schedule>  chlorhexidine 4% Liquid 1 Application(s) Topical <User Schedule>  clopidogrel Tablet 75 milliGRAM(s) Oral daily  DULoxetine 60 milliGRAM(s) Oral daily  epoetin juan-epbx (RETACRIT) Injectable 40699 Unit(s) IV Push <User Schedule>  heparin  Infusion.  Unit(s)/Hr (12 mL/Hr) IV Continuous <Continuous>  hydrALAZINE 50 milliGRAM(s) Oral two times a day  isosorbide   mononitrate ER Tablet (IMDUR) 30 milliGRAM(s) Oral daily  metroNIDAZOLE    Tablet 500 milliGRAM(s) Oral every 8 hours  Nephro-pito 1 Tablet(s) Oral daily  NIFEdipine XL 90 milliGRAM(s) Oral daily  NIFEdipine XL 60 milliGRAM(s) Oral <User Schedule>  pantoprazole    Tablet 40 milliGRAM(s) Oral every 12 hours  sevelamer carbonate 1600 milliGRAM(s) Oral three times a day with meals  tiotropium 18 MICROgram(s) Capsule 1 Capsule(s) Inhalation daily  torsemide 20 milliGRAM(s) Oral <User Schedule>    MEDICATIONS  (PRN):      Vital Signs Last 24 Hrs  T(C): 37.9 (23 Mar 2021 10:06), Max: 38.6 (22 Mar 2021 20:05)  T(F): 100.3 (23 Mar 2021 10:06), Max: 101.5 (22 Mar 2021 20:05)  HR: 63 (23 Mar 2021 10:06) (54 - 99)  BP: 159/63 (23 Mar 2021 10:06) (132/52 - 175/53)  BP(mean): --  RR: 20 (23 Mar 2021 10:06) (18 - 20)  SpO2: 94% (23 Mar 2021 10:06) (92% - 96%)  Daily     Daily Weight in k.2 (23 Mar 2021 04:43)  ICU Vital Signs Last 24 Hrs  CHRISTIANO ELIZABETH  I&O's Detail    22 Mar 2021 07:  -  23 Mar 2021 07:00  --------------------------------------------------------  IN:  Total IN: 0 mL    OUT:    Other (mL): 500 mL  Total OUT: 500 mL    Total NET: -500 mL      23 Mar 2021 07:  -  23 Mar 2021 10:46  --------------------------------------------------------  IN:    Oral Fluid: 360 mL  Total IN: 360 mL    OUT:  Total OUT: 0 mL    Total NET: 360 mL        I&O's Summary    22 Mar 2021 07:  -  23 Mar 2021 07:00  --------------------------------------------------------  IN: 0 mL / OUT: 500 mL / NET: -500 mL    23 Mar 2021 07:01  -  23 Mar 2021 10:46  --------------------------------------------------------  IN: 360 mL / OUT: 0 mL / NET: 360 mL      Drug Dosing Weight  CHRISTIANO ELIZABETH      PHYSICAL EXAM:  General: Appears well developed, well nourished alert and cooperative.  HEENT: Head; normocephalic, atraumatic.  Eyes: Pupils reactive, cornea wnl.  Neck: Supple, no nodes adenopathy, no NVD or carotid bruit or thyromegaly.  CARDIOVASCULAR: Normal S1 and S2, No murmur, rub, gallop or lift.   LUNGS: No rales, rhonchi or wheeze. Normal breath sounds bilaterally.  ABDOMEN: Soft, nontender without mass or organomegaly. bowel sounds normoactive.  EXTREMITIES: No clubbing, cyanosis or edema. Distal pulses wnl.   SKIN: warm and dry with normal turgor.  NEURO: Alert/oriented x 3/normal motor exam. No pathologic reflexes.    PSYCH: normal affect.        LABS:                        8.8    13.40 )-----------( 237      ( 23 Mar 2021 06:59 )             28.4     0323    133<L>  |  91<L>  |  34.0<H>  ----------------------------<  163<H>  4.2   |  27.0  |  4.25<H>    Ca    8.6      23 Mar 2021 06:59  Phos  5.8     23  Mg     2.0     23      CHRISTIANO ELIZABETH      PT/INR - ( 23 Mar 2021 06:59 )   PT: 15.2 sec;   INR: 1.33 ratio         PTT - ( 23 Mar 2021 06:59 )  PTT:60.9 sec  Urinalysis Basic - ( 22 Mar 2021 22:41 )    Color: Yellow / Appearance: Clear / S.020 / pH: x  Gluc: x / Ketone: Trace  / Bili: Small / Urobili: Negative mg/dL   Blood: x / Protein: 100 mg/dL / Nitrite: Negative   Leuk Esterase: Trace / RBC: 0-2 /HPF / WBC 0-2   Sq Epi: x / Non Sq Epi: Occasional / Bacteria: x        RADIOLOGY & ADDITIONAL STUDIES:    INTERPRETATION OF TELEMETRY (personally reviewed):    ECG:    ECHO:    STRESS TEST:

## 2021-03-23 NOTE — PROGRESS NOTE ADULT - SUBJECTIVE AND OBJECTIVE BOX
Overnight events/patient complaints: events noted on IV heparin for DVT    Plavix (Hives)  Toprol-XL (Rash)    MEDICATIONS  (STANDING):  acetaminophen   Tablet .. 975 milliGRAM(s) Oral every 6 hours  ALBUTerol    90 MICROgram(s) HFA Inhaler 1 Puff(s) Inhalation every 4 hours  albuterol/ipratropium for Nebulization 3 milliLiter(s) Nebulizer every 6 hours  atorvastatin 80 milliGRAM(s) Oral at bedtime  cefepime   IVPB 2000 milliGRAM(s) IV Intermittent <User Schedule>  chlorhexidine 4% Liquid 1 Application(s) Topical <User Schedule>  clopidogrel Tablet 75 milliGRAM(s) Oral daily  DULoxetine 60 milliGRAM(s) Oral daily  epoetin juan-epbx (RETACRIT) Injectable 22875 Unit(s) IV Push <User Schedule>  heparin  Infusion.  Unit(s)/Hr (12 mL/Hr) IV Continuous <Continuous>  hydrALAZINE 50 milliGRAM(s) Oral two times a day  isosorbide   mononitrate ER Tablet (IMDUR) 30 milliGRAM(s) Oral daily  metroNIDAZOLE    Tablet 500 milliGRAM(s) Oral every 8 hours  Nephro-pito 1 Tablet(s) Oral daily  NIFEdipine XL 90 milliGRAM(s) Oral daily  NIFEdipine XL 60 milliGRAM(s) Oral <User Schedule>  pantoprazole    Tablet 40 milliGRAM(s) Oral every 12 hours  sevelamer carbonate 1600 milliGRAM(s) Oral three times a day with meals  tiotropium 18 MICROgram(s) Capsule 1 Capsule(s) Inhalation daily  torsemide 20 milliGRAM(s) Oral <User Schedule>    Vital Signs Last 24 Hrs  T(C): 37.9 (23 Mar 2021 10:06), Max: 38.6 (22 Mar 2021 20:05)  T(F): 100.3 (23 Mar 2021 10:06), Max: 101.5 (22 Mar 2021 20:05)  HR: 63 (23 Mar 2021 10:06) (54 - 99)  BP: 159/63 (23 Mar 2021 10:06) (132/52 - 175/53)  RR: 20 (23 Mar 2021 10:06) (18 - 20)  SpO2: 94% (23 Mar 2021 10:06) (92% - 96%)    Daily Weight in k.2 (23 Mar 2021 04:43)    I&O's Detail    22 Mar 2021 07:  -  23 Mar 2021 07:00  --------------------------------------------------------  IN:  Total IN: 0 mL    OUT:    Other (mL): 500 mL  Total OUT: 500 mL    Total NET: -500 mL    23 Mar 2021 07:  -  23 Mar 2021 10:46  --------------------------------------------------------  IN:    Oral Fluid: 360 mL  Total IN: 360 mL    OUT:  Total OUT: 0 mL    Total NET: 360 mL    I&O's Summary    22 Mar 2021 07:  -  23 Mar 2021 07:00  --------------------------------------------------------  IN: 0 mL / OUT: 500 mL / NET: -500 mL    23 Mar 2021 07:  -  23 Mar 2021 10:46  --------------------------------------------------------  IN: 360 mL / OUT: 0 mL / NET: 360 mL    PHYSICAL EXAM:    General: Appears well developed, well nourished alert and cooperative.  HEENT: Head; normocephalic, atraumatic.  Eyes: Pupils reactive, cornea wnl.  Neck: Supple, no nodes adenopathy, no NVD or carotid bruit or thyromegaly.  CARDIOVASCULAR: Normal S1 and S2, No murmur, rub, gallop or lift.   LUNGS: No rales, rhonchi or wheeze. Normal breath sounds bilaterally.  ABDOMEN: Soft, nontender without mass or organomegaly. bowel sounds normoactive.  EXTREMITIES: No clubbing, cyanosis or edema. Distal pulses wnl.   SKIN: warm and dry with normal turgor.  NEURO: Alert/oriented x 3/normal motor exam. No pathologic reflexes.    PSYCH: normal affect.        LABS:                        8.8    13.40 )-----------( 237      ( 23 Mar 2021 06:59 )             28.4     03-23    133<L>  |  91<L>  |  34.0<H>  ----------------------------<  163<H>  4.2   |  27.0  |  4.25<H>    Ca    8.6      23 Mar 2021 06:59  Phos  5.8       Mg     2.0     23    PT/INR - ( 23 Mar 2021 06:59 )   PT: 15.2 sec;   INR: 1.33 ratio      PTT - ( 23 Mar 2021 06:59 )  PTT:60.9 sec  Urinalysis Basic - ( 22 Mar 2021 22:41 )    Color: Yellow / Appearance: Clear / S.020 / pH: x  Gluc: x / Ketone: Trace  / Bili: Small / Urobili: Negative mg/dL   Blood: x / Protein: 100 mg/dL / Nitrite: Negative   Leuk Esterase: Trace / RBC: 0-2 /HPF / WBC 0-2   Sq Epi: x / Non Sq Epi: Occasional / Bacteria: x    Assessment:    S/P LLE bypass   s/p TAVR,  normal EF  PAF   Leadless RV pacer  ESRD on HD      Rec   cont heparin as per vascular Surgery,    HD in AM,

## 2021-03-23 NOTE — PROGRESS NOTE ADULT - ASSESSMENT
Assessment  postop LE bypass   s/p TAVR normal EF  PAF   leadless RV pacer  ESRD on HD      Rec   cont heparin as per vascular  will follow

## 2021-03-23 NOTE — PROGRESS NOTE ADULT - ASSESSMENT
86y Male presenting with severe anemia presenting with rest pain of LLE. Severe plaque burden of LLE on angiogram, unable to cross with wire. Now s/p L CFA to below knee pop bypass w PTFE graft.     PLAN:    -OOB  -oral pain control as needed, all narcotic medications stopped due to delirium  -continue Plavix  -heparin drip   -continue IV antibiotics  -Q4hr vascular exams  -f/u PT  -dressings changed this am 86y Male presenting with severe anemia presenting with rest pain of LLE. Severe plaque burden of LLE on angiogram, unable to cross with wire. Now s/p L CFA to below knee pop bypass w PTFE graft.     PLAN:    -OOB  -oral pain control as needed, all narcotic medications stopped due to delirium  -continue Plavix  -heparin drip   -continue IV antibiotics  -dressings changed this am

## 2021-03-23 NOTE — PROGRESS NOTE ADULT - SUBJECTIVE AND OBJECTIVE BOX
INTERVAL HPI/OVERNIGHT EVENTS:    Patient was altered overnight, prompting a rapid response, found to be febrile. Workup revealed a LLE DVT for which he was started on a Heparin drip and CTA chest showed pleural effusions, and pulmonary processes, patient started on empiric antibiotics for presumed pneumonia.       MEDICATIONS  (STANDING):  acetaminophen   Tablet .. 975 milliGRAM(s) Oral every 6 hours  ALBUTerol    90 MICROgram(s) HFA Inhaler 1 Puff(s) Inhalation every 4 hours  albuterol/ipratropium for Nebulization 3 milliLiter(s) Nebulizer every 6 hours  atorvastatin 80 milliGRAM(s) Oral at bedtime  cefepime   IVPB 2000 milliGRAM(s) IV Intermittent <User Schedule>  chlorhexidine 4% Liquid 1 Application(s) Topical <User Schedule>  clopidogrel Tablet 75 milliGRAM(s) Oral daily  DULoxetine 60 milliGRAM(s) Oral daily  epoetin juan-epbx (RETACRIT) Injectable 40683 Unit(s) IV Push <User Schedule>  heparin  Infusion.  Unit(s)/Hr (12 mL/Hr) IV Continuous <Continuous>  hydrALAZINE 50 milliGRAM(s) Oral two times a day  isosorbide   mononitrate ER Tablet (IMDUR) 30 milliGRAM(s) Oral daily  metroNIDAZOLE    Tablet 500 milliGRAM(s) Oral every 8 hours  Nephro-pito 1 Tablet(s) Oral daily  NIFEdipine XL 90 milliGRAM(s) Oral daily  NIFEdipine XL 60 milliGRAM(s) Oral <User Schedule>  pantoprazole    Tablet 40 milliGRAM(s) Oral every 12 hours  sevelamer carbonate 1600 milliGRAM(s) Oral three times a day with meals  tiotropium 18 MICROgram(s) Capsule 1 Capsule(s) Inhalation daily  torsemide 20 milliGRAM(s) Oral <User Schedule>    MEDICATIONS  (PRN):      Vital Signs Last 24 Hrs  T(C): 37.2 (23 Mar 2021 12:32), Max: 38.6 (22 Mar 2021 20:05)  T(F): 98.9 (23 Mar 2021 12:32), Max: 101.5 (22 Mar 2021 20:05)  HR: 72 (23 Mar 2021 12:32) (54 - 99)  BP: 170/68 (23 Mar 2021 12:32) (132/52 - 175/53)  BP(mean): --  RR: 16 (23 Mar 2021 12:32) (16 - 20)  SpO2: 97% (23 Mar 2021 12:32) (92% - 97%)    PE  Gen: resting comfortably in bed, nad, confused  Pulm: no increased wob, no conversational dyspnea  Abd: non-distended, soft, non-tender  Ext: left groin notable for hematoma and ecchymosis but remains soft, L groin incision c/d/i, Island dressing.  LLE incision with mild oozing from incisional site. L AT monophasic signals. Stable dusky discoloration of distal L toes.   s       I&O's Detail    22 Mar 2021 07:01  -  23 Mar 2021 07:00  --------------------------------------------------------  IN:  Total IN: 0 mL    OUT:    Other (mL): 500 mL  Total OUT: 500 mL    Total NET: -500 mL      23 Mar 2021 07:01  -  23 Mar 2021 14:52  --------------------------------------------------------  IN:    Oral Fluid: 360 mL  Total IN: 360 mL    OUT:  Total OUT: 0 mL    Total NET: 360 mL          LABS:                        8.8    13.40 )-----------( 237      ( 23 Mar 2021 06:59 )             28.4     03-23    133<L>  |  91<L>  |  34.0<H>  ----------------------------<  163<H>  4.2   |  27.0  |  4.25<H>    Ca    8.6      23 Mar 2021 06:59  Phos  5.8     03-23  Mg     2.0     03-23      PT/INR - ( 23 Mar 2021 06:59 )   PT: 15.2 sec;   INR: 1.33 ratio         PTT - ( 23 Mar 2021 06:59 )  PTT:60.9 sec  Urinalysis Basic - ( 22 Mar 2021 22:41 )    Color: Yellow / Appearance: Clear / S.020 / pH: x  Gluc: x / Ketone: Trace  / Bili: Small / Urobili: Negative mg/dL   Blood: x / Protein: 100 mg/dL / Nitrite: Negative   Leuk Esterase: Trace / RBC: 0-2 /HPF / WBC 0-2   Sq Epi: x / Non Sq Epi: Occasional / Bacteria: x        RADIOLOGY & ADDITIONAL STUDIES:

## 2021-03-24 ENCOUNTER — TRANSCRIPTION ENCOUNTER (OUTPATIENT)
Age: 86
End: 2021-03-24

## 2021-03-24 LAB
ANION GAP SERPL CALC-SCNC: 18 MMOL/L — HIGH (ref 5–17)
ANISOCYTOSIS BLD QL: SLIGHT — SIGNIFICANT CHANGE UP
APTT BLD: 57.6 SEC — HIGH (ref 27.5–35.5)
APTT BLD: 63.3 SEC — HIGH (ref 27.5–35.5)
APTT BLD: 64.5 SEC — HIGH (ref 27.5–35.5)
BASOPHILS # BLD AUTO: 0 K/UL — SIGNIFICANT CHANGE UP (ref 0–0.2)
BASOPHILS NFR BLD AUTO: 0 % — SIGNIFICANT CHANGE UP (ref 0–2)
BUN SERPL-MCNC: 53 MG/DL — HIGH (ref 8–20)
CALCIUM SERPL-MCNC: 8.4 MG/DL — LOW (ref 8.6–10.2)
CHLORIDE SERPL-SCNC: 88 MMOL/L — LOW (ref 98–107)
CO2 SERPL-SCNC: 24 MMOL/L — SIGNIFICANT CHANGE UP (ref 22–29)
CREAT SERPL-MCNC: 5.6 MG/DL — HIGH (ref 0.5–1.3)
DACRYOCYTES BLD QL SMEAR: SLIGHT — SIGNIFICANT CHANGE UP
EOSINOPHIL # BLD AUTO: 0 K/UL — SIGNIFICANT CHANGE UP (ref 0–0.5)
EOSINOPHIL NFR BLD AUTO: 0 % — SIGNIFICANT CHANGE UP (ref 0–6)
GIANT PLATELETS BLD QL SMEAR: PRESENT — SIGNIFICANT CHANGE UP
GLUCOSE SERPL-MCNC: 222 MG/DL — HIGH (ref 70–99)
HCT VFR BLD CALC: 27.1 % — LOW (ref 39–50)
HCT VFR BLD CALC: 30.1 % — LOW (ref 39–50)
HGB BLD-MCNC: 8.5 G/DL — LOW (ref 13–17)
HGB BLD-MCNC: 9 G/DL — LOW (ref 13–17)
HYPOCHROMIA BLD QL: SLIGHT — SIGNIFICANT CHANGE UP
INR BLD: 1.52 RATIO — HIGH (ref 0.88–1.16)
LYMPHOCYTES # BLD AUTO: 0.14 K/UL — LOW (ref 1–3.3)
LYMPHOCYTES # BLD AUTO: 0.9 % — LOW (ref 13–44)
MACROCYTES BLD QL: SLIGHT — SIGNIFICANT CHANGE UP
MAGNESIUM SERPL-MCNC: 2.1 MG/DL — SIGNIFICANT CHANGE UP (ref 1.6–2.6)
MANUAL SMEAR VERIFICATION: SIGNIFICANT CHANGE UP
MCHC RBC-ENTMCNC: 27.4 PG — SIGNIFICANT CHANGE UP (ref 27–34)
MCHC RBC-ENTMCNC: 28.5 PG — SIGNIFICANT CHANGE UP (ref 27–34)
MCHC RBC-ENTMCNC: 29.9 GM/DL — LOW (ref 32–36)
MCHC RBC-ENTMCNC: 31.4 GM/DL — LOW (ref 32–36)
MCV RBC AUTO: 90.9 FL — SIGNIFICANT CHANGE UP (ref 80–100)
MCV RBC AUTO: 91.5 FL — SIGNIFICANT CHANGE UP (ref 80–100)
MONOCYTES # BLD AUTO: 1.95 K/UL — HIGH (ref 0–0.9)
MONOCYTES NFR BLD AUTO: 13 % — SIGNIFICANT CHANGE UP (ref 2–14)
NEUTROPHILS # BLD AUTO: 12.92 K/UL — HIGH (ref 1.8–7.4)
NEUTROPHILS NFR BLD AUTO: 86.1 % — HIGH (ref 43–77)
PHOSPHATE SERPL-MCNC: 7.6 MG/DL — HIGH (ref 2.4–4.7)
PLAT MORPH BLD: NORMAL — SIGNIFICANT CHANGE UP
PLATELET # BLD AUTO: 263 K/UL — SIGNIFICANT CHANGE UP (ref 150–400)
PLATELET # BLD AUTO: 286 K/UL — SIGNIFICANT CHANGE UP (ref 150–400)
POIKILOCYTOSIS BLD QL AUTO: SIGNIFICANT CHANGE UP
POLYCHROMASIA BLD QL SMEAR: SIGNIFICANT CHANGE UP
POTASSIUM SERPL-MCNC: 4.9 MMOL/L — SIGNIFICANT CHANGE UP (ref 3.5–5.3)
POTASSIUM SERPL-SCNC: 4.9 MMOL/L — SIGNIFICANT CHANGE UP (ref 3.5–5.3)
PROTHROM AB SERPL-ACNC: 17.3 SEC — HIGH (ref 10.6–13.6)
RBC # BLD: 2.98 M/UL — LOW (ref 4.2–5.8)
RBC # BLD: 3.29 M/UL — LOW (ref 4.2–5.8)
RBC # FLD: 18.3 % — HIGH (ref 10.3–14.5)
RBC # FLD: 18.6 % — HIGH (ref 10.3–14.5)
RBC BLD AUTO: ABNORMAL
SARS-COV-2 RNA SPEC QL NAA+PROBE: SIGNIFICANT CHANGE UP
SODIUM SERPL-SCNC: 130 MMOL/L — LOW (ref 135–145)
VANCOMYCIN FLD-MCNC: 7.7 UG/ML — SIGNIFICANT CHANGE UP
WBC # BLD: 15 K/UL — HIGH (ref 3.8–10.5)
WBC # BLD: 15.16 K/UL — HIGH (ref 3.8–10.5)
WBC # FLD AUTO: 15 K/UL — HIGH (ref 3.8–10.5)
WBC # FLD AUTO: 15.16 K/UL — HIGH (ref 3.8–10.5)

## 2021-03-24 PROCEDURE — 90937 HEMODIALYSIS REPEATED EVAL: CPT

## 2021-03-24 RX ORDER — SODIUM CHLORIDE 9 MG/ML
1 INJECTION INTRAMUSCULAR; INTRAVENOUS; SUBCUTANEOUS
Refills: 0 | Status: DISCONTINUED | OUTPATIENT
Start: 2021-03-24 | End: 2021-03-25

## 2021-03-24 RX ORDER — IBUPROFEN 200 MG
200 TABLET ORAL ONCE
Refills: 0 | Status: COMPLETED | OUTPATIENT
Start: 2021-03-24 | End: 2021-03-24

## 2021-03-24 RX ORDER — GLYCERIN 1 %
1 DROPS OPHTHALMIC (EYE) ONCE
Refills: 0 | Status: COMPLETED | OUTPATIENT
Start: 2021-03-24 | End: 2021-03-24

## 2021-03-24 RX ORDER — VANCOMYCIN HCL 1 G
1000 VIAL (EA) INTRAVENOUS ONCE
Refills: 0 | Status: COMPLETED | OUTPATIENT
Start: 2021-03-24 | End: 2021-03-24

## 2021-03-24 RX ADMIN — Medication 975 MILLIGRAM(S): at 00:08

## 2021-03-24 RX ADMIN — Medication 50 MILLIGRAM(S): at 18:06

## 2021-03-24 RX ADMIN — SEVELAMER CARBONATE 1600 MILLIGRAM(S): 2400 POWDER, FOR SUSPENSION ORAL at 12:17

## 2021-03-24 RX ADMIN — CHLORHEXIDINE GLUCONATE 1 APPLICATION(S): 213 SOLUTION TOPICAL at 05:17

## 2021-03-24 RX ADMIN — DULOXETINE HYDROCHLORIDE 60 MILLIGRAM(S): 30 CAPSULE, DELAYED RELEASE ORAL at 12:18

## 2021-03-24 RX ADMIN — Medication 90 MILLIGRAM(S): at 05:17

## 2021-03-24 RX ADMIN — HEPARIN SODIUM 1200 UNIT(S)/HR: 5000 INJECTION INTRAVENOUS; SUBCUTANEOUS at 10:30

## 2021-03-24 RX ADMIN — Medication 200 MILLIGRAM(S): at 20:50

## 2021-03-24 RX ADMIN — PANTOPRAZOLE SODIUM 40 MILLIGRAM(S): 20 TABLET, DELAYED RELEASE ORAL at 09:23

## 2021-03-24 RX ADMIN — Medication 20 MILLIGRAM(S): at 12:18

## 2021-03-24 RX ADMIN — Medication 1 DROP(S): at 22:26

## 2021-03-24 RX ADMIN — PANTOPRAZOLE SODIUM 40 MILLIGRAM(S): 20 TABLET, DELAYED RELEASE ORAL at 22:26

## 2021-03-24 RX ADMIN — Medication 975 MILLIGRAM(S): at 12:18

## 2021-03-24 RX ADMIN — Medication 975 MILLIGRAM(S): at 18:08

## 2021-03-24 RX ADMIN — CLOPIDOGREL BISULFATE 75 MILLIGRAM(S): 75 TABLET, FILM COATED ORAL at 12:17

## 2021-03-24 RX ADMIN — SODIUM CHLORIDE 1 GRAM(S): 9 INJECTION INTRAMUSCULAR; INTRAVENOUS; SUBCUTANEOUS at 18:07

## 2021-03-24 RX ADMIN — ATORVASTATIN CALCIUM 80 MILLIGRAM(S): 80 TABLET, FILM COATED ORAL at 22:26

## 2021-03-24 RX ADMIN — Medication 200 MILLIGRAM(S): at 21:57

## 2021-03-24 RX ADMIN — SEVELAMER CARBONATE 1600 MILLIGRAM(S): 2400 POWDER, FOR SUSPENSION ORAL at 09:23

## 2021-03-24 RX ADMIN — Medication 500 MILLIGRAM(S): at 18:06

## 2021-03-24 RX ADMIN — Medication 975 MILLIGRAM(S): at 05:16

## 2021-03-24 RX ADMIN — Medication 250 MILLIGRAM(S): at 18:07

## 2021-03-24 RX ADMIN — ERYTHROPOIETIN 12000 UNIT(S): 10000 INJECTION, SOLUTION INTRAVENOUS; SUBCUTANEOUS at 16:33

## 2021-03-24 RX ADMIN — SEVELAMER CARBONATE 1600 MILLIGRAM(S): 2400 POWDER, FOR SUSPENSION ORAL at 18:07

## 2021-03-24 RX ADMIN — Medication 500 MILLIGRAM(S): at 05:17

## 2021-03-24 RX ADMIN — Medication 50 MILLIGRAM(S): at 05:17

## 2021-03-24 RX ADMIN — ISOSORBIDE MONONITRATE 30 MILLIGRAM(S): 60 TABLET, EXTENDED RELEASE ORAL at 18:06

## 2021-03-24 RX ADMIN — HEPARIN SODIUM 1200 UNIT(S)/HR: 5000 INJECTION INTRAVENOUS; SUBCUTANEOUS at 02:32

## 2021-03-24 RX ADMIN — Medication 1 TABLET(S): at 12:18

## 2021-03-24 RX ADMIN — Medication 975 MILLIGRAM(S): at 01:15

## 2021-03-24 NOTE — PROGRESS NOTE ADULT - ASSESSMENT
86y Male presenting with severe anemia presenting with rest pain of LLE. Severe plaque burden of LLE on angiogram, unable to cross with wire. Now s/p L CFA to below knee pop bypass w PTFE graft.     PLAN:    - Plan for OR 3/25 for possible LLE thrombectomy, graft revision, or other indicated intervention  - NPO at MN, pre op for OR tomorrow  - HD today pre op - discussed with dr. Avendano   -oral pain control as needed, all narcotic medications stopped due to delirium  -continue Plavix  -heparin drip   -continue IV antibiotics  -Q4hr vascular exams  -f/u PT  -dressings changed this am

## 2021-03-24 NOTE — PROGRESS NOTE ADULT - SUBJECTIVE AND OBJECTIVE BOX
[facility] DIVISION OF KIDNEY DISEASES AND HYPERTENSION -- FOLLOW UP NOTE  --------------------------------------------------------------------------------  Chief Complaint: ESRD on HD- left leg     24 hour events/subjective: Pt acute events overnight    PAST HISTORY  --------------------------------------------------------------------------------  No significant changes to PMH, PSH, FHx, SHx, unless otherwise noted    ALLERGIES & MEDICATIONS  --------------------------------------------------------------------------------  Allergies    Plavix (Hives)  Toprol-XL (Rash)    Intolerances    Standing Inpatient Medications  acetaminophen   Tablet .. 975 milliGRAM(s) Oral every 6 hours  atorvastatin 80 milliGRAM(s) Oral at bedtime  cefepime   IVPB 2000 milliGRAM(s) IV Intermittent <User Schedule>  chlorhexidine 4% Liquid 1 Application(s) Topical <User Schedule>  clopidogrel Tablet 75 milliGRAM(s) Oral daily  DULoxetine 60 milliGRAM(s) Oral daily  epoetin juan-epbx (RETACRIT) Injectable 52727 Unit(s) IV Push <User Schedule>  heparin  Infusion.  Unit(s)/Hr IV Continuous <Continuous>  hydrALAZINE 50 milliGRAM(s) Oral two times a day  isosorbide   mononitrate ER Tablet (IMDUR) 30 milliGRAM(s) Oral daily  metroNIDAZOLE    Tablet 500 milliGRAM(s) Oral every 8 hours  Nephro-pito 1 Tablet(s) Oral daily  NIFEdipine XL 90 milliGRAM(s) Oral daily  NIFEdipine XL 60 milliGRAM(s) Oral <User Schedule>  pantoprazole    Tablet 40 milliGRAM(s) Oral every 12 hours  sevelamer carbonate 1600 milliGRAM(s) Oral three times a day with meals  tiotropium 18 MICROgram(s) Capsule 1 Capsule(s) Inhalation daily  torsemide 20 milliGRAM(s) Oral <User Schedule>    PRN Inpatient Medications      REVIEW OF SYSTEMS  --------------------------------------------------------------------------------  Deferred patient confused    VITALS/PHYSICAL EXAM  --------------------------------------------------------------------------------  T(C): 36.9 (03-24-21 @ 08:25), Max: 37.2 (03-24-21 @ 05:15)  HR: 57 (03-24-21 @ 08:25) (57 - 68)  BP: 128/52 (03-24-21 @ 08:25) (128/52 - 157/67)  RR: 18 (03-24-21 @ 08:25) (16 - 18)  SpO2: 91% (03-24-21 @ 08:25) (91% - 98%)  Wt(kg): --    03-23-21 @ 07:01  -  03-24-21 @ 07:00  --------------------------------------------------------  IN: 1194 mL / OUT: 0 mL / NET: 1194 mL    Physical Exam:  	Gen: Ill appearing, sitting in chair, frail  	HEENT: PERRL, supple neck no JVD  	Pulm: CTA B/L  	CV: RRR, S1S2; n  	Abd: +BS, soft, nontender/nondistended-appetite poor  	: anuric  	LLE-cold, mottled RLE-warm, no edema  	Neuro: No focal deficits,   	Psych: Confused, daughter at bedside, reoriented easily  	Skin: Warm, without rashes  	Vascular access: AVF    LABS/STUDIES  --------------------------------------------------------------------------------              9.0    15.16 >-----------<  263      [03-24-21 @ 00:00]              30.1     133  |  91  |  34.0  ----------------------------<  163      [03-23-21 @ 06:59]  4.2   |  27.0  |  4.25        Ca     8.6     [03-23-21 @ 06:59]      Mg     2.0     [03-23-21 @ 06:59]      Phos  5.8     [03-23-21 @ 06:59]    PT/INR: PT 15.2 , INR 1.33       [03-23-21 @ 06:59]  PTT: 57.6       [03-24-21 @ 09:28]  Creatinine Trend:  SCr 4.25 [03-23 @ 06:59]  SCr 3.53 [03-22 @ 19:45]  SCr 6.60 [03-22 @ 06:11]  SCr 5.49 [03-21 @ 06:46]  SCr 4.05 [03-20 @ 06:27]    Urinalysis - [03-22-21 @ 22:41]      Color Yellow / Appearance Clear / SG 1.020 / pH 5.0      Gluc 50 / Ketone Trace  / Bili Small / Urobili Negative       Blood Moderate / Protein 100 / Leuk Est Trace / Nitrite Negative      RBC 0-2 / WBC 0-2 / Hyaline  / Gran  / Sq Epi  / Non Sq Epi Occasional / Bacteria     Iron 53, TIBC 266, %sat 20      [08-19-20 @ 06:32]  Ferritin 184      [08-19-20 @ 06:32]  PTH -- (Ca 9.6)      [08-19-20 @ 16:08]   91  Vitamin D (25OH) 26.4      [08-19-20 @ 16:08]  HbA1c 4.9      [08-09-18 @ 05:54]  TSH 2.16      [09-15-20 @ 02:01] [facility] DIVISION OF KIDNEY DISEASES AND HYPERTENSION -- FOLLOW UP NOTE  --------------------------------------------------------------------------------  Chief Complaint: ESRD on HD- left leg ischemia    24 hour events/subjective: No acute events overnight    PAST HISTORY  --------------------------------------------------------------------------------  No significant changes to PMH, PSH, FHx, SHx, unless otherwise noted    ALLERGIES & MEDICATIONS  --------------------------------------------------------------------------------  Allergies    Plavix (Hives)  Toprol-XL (Rash)    Intolerances    Standing Inpatient Medications  acetaminophen   Tablet .. 975 milliGRAM(s) Oral every 6 hours  atorvastatin 80 milliGRAM(s) Oral at bedtime  cefepime   IVPB 2000 milliGRAM(s) IV Intermittent <User Schedule>  chlorhexidine 4% Liquid 1 Application(s) Topical <User Schedule>  clopidogrel Tablet 75 milliGRAM(s) Oral daily  DULoxetine 60 milliGRAM(s) Oral daily  epoetin juan-epbx (RETACRIT) Injectable 00327 Unit(s) IV Push <User Schedule>  heparin  Infusion.  Unit(s)/Hr IV Continuous <Continuous>  hydrALAZINE 50 milliGRAM(s) Oral two times a day  isosorbide   mononitrate ER Tablet (IMDUR) 30 milliGRAM(s) Oral daily  metroNIDAZOLE    Tablet 500 milliGRAM(s) Oral every 8 hours  Nephro-pito 1 Tablet(s) Oral daily  NIFEdipine XL 90 milliGRAM(s) Oral daily  NIFEdipine XL 60 milliGRAM(s) Oral <User Schedule>  pantoprazole    Tablet 40 milliGRAM(s) Oral every 12 hours  sevelamer carbonate 1600 milliGRAM(s) Oral three times a day with meals  tiotropium 18 MICROgram(s) Capsule 1 Capsule(s) Inhalation daily  torsemide 20 milliGRAM(s) Oral <User Schedule>    PRN Inpatient Medications    REVIEW OF SYSTEMS  --------------------------------------------------------------------------------  Deferred patient confused    VITALS/PHYSICAL EXAM  --------------------------------------------------------------------------------  T(C): 36.9 (03-24-21 @ 08:25), Max: 37.2 (03-24-21 @ 05:15)  HR: 57 (03-24-21 @ 08:25) (57 - 68)  BP: 128/52 (03-24-21 @ 08:25) (128/52 - 157/67)  RR: 18 (03-24-21 @ 08:25) (16 - 18)  SpO2: 91% (03-24-21 @ 08:25) (91% - 98%)  Wt(kg): --    03-23-21 @ 07:01  -  03-24-21 @ 07:00  --------------------------------------------------------  IN: 1194 mL / OUT: 0 mL / NET: 1194 mL    Physical Exam:  	Gen: Ill appearing, sitting in chair, frail  	HEENT: PERRL, supple neck no JVD  	Pulm: CTA B/L  	CV: RRR, S1S2; n  	Abd: +BS, soft, nontender/nondistended-appetite poor  	: anuric  	LLE-cold, mottled, decreased sensation RLE-warm, no edema  	Neuro: No focal deficits,   	Psych: Confused, daughter at bedside, reoriented easily  	Skin: Warm, without rashes  	Vascular access: AVF    LABS/STUDIES  --------------------------------------------------------------------------------              9.0    15.16 >-----------<  263      [03-24-21 @ 00:00]              30.1     133  |  91  |  34.0  ----------------------------<  163      [03-23-21 @ 06:59]  4.2   |  27.0  |  4.25        Ca     8.6     [03-23-21 @ 06:59]      Mg     2.0     [03-23-21 @ 06:59]      Phos  5.8     [03-23-21 @ 06:59]    PT/INR: PT 15.2 , INR 1.33       [03-23-21 @ 06:59]  PTT: 57.6       [03-24-21 @ 09:28]  Creatinine Trend:  SCr 4.25 [03-23 @ 06:59]  SCr 3.53 [03-22 @ 19:45]  SCr 6.60 [03-22 @ 06:11]  SCr 5.49 [03-21 @ 06:46]  SCr 4.05 [03-20 @ 06:27]    Urinalysis - [03-22-21 @ 22:41]      Color Yellow / Appearance Clear / SG 1.020 / pH 5.0      Gluc 50 / Ketone Trace  / Bili Small / Urobili Negative       Blood Moderate / Protein 100 / Leuk Est Trace / Nitrite Negative      RBC 0-2 / WBC 0-2 / Hyaline  / Gran  / Sq Epi  / Non Sq Epi Occasional / Bacteria     Iron 53, TIBC 266, %sat 20      [08-19-20 @ 06:32]  Ferritin 184      [08-19-20 @ 06:32]  PTH -- (Ca 9.6)      [08-19-20 @ 16:08]   91  Vitamin D (25OH) 26.4      [08-19-20 @ 16:08]  HbA1c 4.9      [08-09-18 @ 05:54]  TSH 2.16      [09-15-20 @ 02:01]

## 2021-03-24 NOTE — PROGRESS NOTE ADULT - ASSESSMENT
86 MALE SCHEDULED FOR rIGHT lOWER eXTEREMITY fEMORAL pOP bYPASS reVISION,  Chart reviewed ASA 4 Cardiac clearance in chart.

## 2021-03-24 NOTE — PROGRESS NOTE ADULT - SUBJECTIVE AND OBJECTIVE BOX
[facility]                                   [address] [place] [address]                                                      PHONE: [phone]                                                         FAX: [phone]  [email or web address]  ---------------------------------------------------------------------------------------------------------------------------------    Overnight events/patient complaints:  NAD     Plavix (Hives)  Toprol-XL (Rash)    MEDICATIONS  (STANDING):  acetaminophen   Tablet .. 975 milliGRAM(s) Oral every 6 hours  atorvastatin 80 milliGRAM(s) Oral at bedtime  cefepime   IVPB 2000 milliGRAM(s) IV Intermittent <User Schedule>  chlorhexidine 4% Liquid 1 Application(s) Topical <User Schedule>  clopidogrel Tablet 75 milliGRAM(s) Oral daily  DULoxetine 60 milliGRAM(s) Oral daily  epoetin juan-epbx (RETACRIT) Injectable 36277 Unit(s) IV Push <User Schedule>  heparin  Infusion.  Unit(s)/Hr (12 mL/Hr) IV Continuous <Continuous>  hydrALAZINE 50 milliGRAM(s) Oral two times a day  isosorbide   mononitrate ER Tablet (IMDUR) 30 milliGRAM(s) Oral daily  metroNIDAZOLE    Tablet 500 milliGRAM(s) Oral every 8 hours  Nephro-pito 1 Tablet(s) Oral daily  NIFEdipine XL 90 milliGRAM(s) Oral daily  NIFEdipine XL 60 milliGRAM(s) Oral <User Schedule>  pantoprazole    Tablet 40 milliGRAM(s) Oral every 12 hours  sevelamer carbonate 1600 milliGRAM(s) Oral three times a day with meals  tiotropium 18 MICROgram(s) Capsule 1 Capsule(s) Inhalation daily  torsemide 20 milliGRAM(s) Oral <User Schedule>    MEDICATIONS  (PRN):      Vital Signs Last 24 Hrs  T(C): 36.9 (24 Mar 2021 08:25), Max: 37.2 (23 Mar 2021 12:32)  T(F): 98.5 (24 Mar 2021 08:25), Max: 98.9 (23 Mar 2021 12:32)  HR: 57 (24 Mar 2021 08:25) (57 - 72)  BP: 128/52 (24 Mar 2021 08:25) (128/52 - 170/68)  BP(mean): --  RR: 18 (24 Mar 2021 08:25) (16 - 18)  SpO2: 91% (24 Mar 2021 08:25) (91% - 98%)  ICU Vital Signs Last 24 Hrs  CHRISTIANO ELIZABETH  I&O's Detail    23 Mar 2021 07:01  -  24 Mar 2021 07:00  --------------------------------------------------------  IN:    Heparin Infusion: 144 mL    Oral Fluid: 1050 mL  Total IN: 1194 mL    OUT:  Total OUT: 0 mL    Total NET: 1194 mL        I&O's Summary    23 Mar 2021 07:01  -  24 Mar 2021 07:00  --------------------------------------------------------  IN: 1194 mL / OUT: 0 mL / NET: 1194 mL      Drug Dosing Weight  CHRISTIANO ELIZABETH      PHYSICAL EXAM:  General: Appears well developed, well nourished alert and cooperative.  HEENT: Head; normocephalic, atraumatic.  Eyes: Pupils reactive, cornea wnl.  Neck: Supple, no nodes adenopathy, no NVD or carotid bruit or thyromegaly.  CARDIOVASCULAR: Normal S1 and S2, 1/6 murmur, rub, gallop or lift.   LUNGS: Decrease BS B/L   ABDOMEN: Soft, nontender without mass or organomegaly. bowel sounds normoactive.  EXTREMITIES: No clubbing, cyanosis + edema. Distal pulses decrease LLE   SKIN: warm and dry with normal turgor.  NEURO: Alert/oriented x 3/normal motor exam. No pathologic reflexes.    PSYCH: normal affect.        LABS:                        9.0    15.16 )-----------( 263      ( 24 Mar 2021 00:00 )             30.1     03-23    133<L>  |  91<L>  |  34.0<H>  ----------------------------<  163<H>  4.2   |  27.0  |  4.25<H>    Ca    8.6      23 Mar 2021 06:59  Phos  5.8     03-  Mg     2.0     -      CHRISTIANO JHAETTA      PT/INR - ( 23 Mar 2021 06:59 )   PT: 15.2 sec;   INR: 1.33 ratio         PTT - ( 24 Mar 2021 09:28 )  PTT:57.6 sec  Urinalysis Basic - ( 22 Mar 2021 22:41 )    Color: Yellow / Appearance: Clear / S.020 / pH: x  Gluc: x / Ketone: Trace  / Bili: Small / Urobili: Negative mg/dL   Blood: x / Protein: 100 mg/dL / Nitrite: Negative   Leuk Esterase: Trace / RBC: 0-2 /HPF / WBC 0-2   Sq Epi: x / Non Sq Epi: Occasional / Bacteria: x        RADIOLOGY & ADDITIONAL STUDIES:    INTERPRETATION OF TELEMETRY (personally reviewed):      Summary:   1. Moderately enlarged left atrium.   2. There is mild concentric left ventricular hypertrophy.   3. Left ventricular ejection fraction, by visual estimation, is 60 to 65%.   4. Grade II diastolic dysfunction. Elevated mean left atrial pressure.   5. Mildly enlarged right atrium.   6. Mildly enlarged right ventricle. Mildly reduced RV systolic function.   7. Mild mitral stenosis. Moderate mitral valve regurgitation. Elevated mean gradient likely due to volume overload. Repeat study after diuresis to erevaluate mitral valve. If clinically indicated.   8. 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 Well seated valve. Acceptable gradients. No regurgitation.   9. Mild tricuspid regurgitation.  10. Estimated pulmonary artery systolic pressure is 78 mmHg - severe pulmonary hypertension.  11. There is no evidence of pericardial effusion.    MD Rohith, RPVI Electronically signed on 3/14/2021 at 6:02:57 PM    86 Male with past medical history significant for HTN, HLD, CAD, HFpEF, s/p Right CEA for Carotid artery disease, ESRD on HD 2d/week via LUE fistula, AS s/p TAVR, PAD with RLE revasc SSS s/p PPM placed in Oct 2020, anemia requiring prior transfusions AVM now with DVT/PVD    On heparin gtt   Monitor H/H     Patient undergoing a moderate to high risk procedure in which patient has a moderate CV risk but no active contraindication at this time

## 2021-03-24 NOTE — PROGRESS NOTE ADULT - ASSESSMENT
· Assessment	  Patient is an  86y old m with severe anemia presenting with rest pain of LLE. Severe plaque burden of LLE on angiogram today, unable to cross with wire.  s/p bypass femoral-popliteal -left foot cold with mottling-vascular plans for OR 3/25 for revascularization attempt    ESRD on HD- twice weekly schedule (MF outpt)  HD today 3/24 in preparation for OR tomorrow    cont heparin as per vascular Surgery  Continue cefepime after HD  Anemia: Hb at goal; continue AMY    BP stable; continue current regimen    will follow       · Assessment	  Patient is an 86y old male with severe anemia presenting with rest pain of LLE. Severe plaque burden of LLE on angiogram today, unable to cross with wire.  s/p bypass femoral-popliteal -left foot cold with mottling-vascular Sx plans for OR 3/25 for revascularization attempt    ESRD on HD- twice weekly schedule (MF outpt)  HD today 3/24 in preparation for OR tomorrow    cont heparin as per vascular Surgery  Continue cefepime after HD  Anemia: Hb below goal; continue AMY transfusion PRBC PRN    BP stable; continue current regimen    will follow

## 2021-03-24 NOTE — PROGRESS NOTE ADULT - SUBJECTIVE AND OBJECTIVE BOX
HPI/OVERNIGHT EVENTS:  no acute events overnight. afebrile overnight. LLE continues to show signs of malperfusion with mottling of skin, decreased sensation and pain doppler exam unchanged from yesterday.     MEDICATIONS  (STANDING):  acetaminophen   Tablet .. 975 milliGRAM(s) Oral every 6 hours  atorvastatin 80 milliGRAM(s) Oral at bedtime  cefepime   IVPB 2000 milliGRAM(s) IV Intermittent <User Schedule>  chlorhexidine 4% Liquid 1 Application(s) Topical <User Schedule>  clopidogrel Tablet 75 milliGRAM(s) Oral daily  DULoxetine 60 milliGRAM(s) Oral daily  epoetin juan-epbx (RETACRIT) Injectable 54917 Unit(s) IV Push <User Schedule>  heparin  Infusion.  Unit(s)/Hr (12 mL/Hr) IV Continuous <Continuous>  hydrALAZINE 50 milliGRAM(s) Oral two times a day  isosorbide   mononitrate ER Tablet (IMDUR) 30 milliGRAM(s) Oral daily  metroNIDAZOLE    Tablet 500 milliGRAM(s) Oral every 8 hours  Nephro-pito 1 Tablet(s) Oral daily  NIFEdipine XL 90 milliGRAM(s) Oral daily  NIFEdipine XL 60 milliGRAM(s) Oral <User Schedule>  pantoprazole    Tablet 40 milliGRAM(s) Oral every 12 hours  sevelamer carbonate 1600 milliGRAM(s) Oral three times a day with meals  tiotropium 18 MICROgram(s) Capsule 1 Capsule(s) Inhalation daily  torsemide 20 milliGRAM(s) Oral <User Schedule>    MEDICATIONS  (PRN):      Vital Signs Last 24 Hrs  T(C): 36.9 (24 Mar 2021 08:25), Max: 37.2 (23 Mar 2021 12:32)  T(F): 98.5 (24 Mar 2021 08:25), Max: 98.9 (23 Mar 2021 12:32)  HR: 57 (24 Mar 2021 08:25) (57 - 72)  BP: 128/52 (24 Mar 2021 08:25) (128/52 - 170/68)  BP(mean): --  RR: 18 (24 Mar 2021 08:25) (16 - 18)  SpO2: 91% (24 Mar 2021 08:25) (91% - 98%)    PE  Gen: resting comfortably in bed, nad, confused  Pulm: no increased wob, no conversational dyspnea  Abd: non-distended, soft, non-tender  Ext: left groin notable for hematoma and ecchymosis but remains soft, L groin incision c/d/i, Island dressing.  LLE incision with mild oozing from incisional site. L AT monophasic signals. mottling of LLE from toes to just above ankle. Monophasic signal at AT. biphasic signal noted at popliteal     I&O's Detail    23 Mar 2021 07:01  -  24 Mar 2021 07:00  --------------------------------------------------------  IN:    Heparin Infusion: 144 mL    Oral Fluid: 1050 mL  Total IN: 1194 mL    OUT:  Total OUT: 0 mL    Total NET: 1194 mL          LABS:                        9.0    15.16 )-----------( 263      ( 24 Mar 2021 00:00 )             30.1     03-23    133<L>  |  91<L>  |  34.0<H>  ----------------------------<  163<H>  4.2   |  27.0  |  4.25<H>    Ca    8.6      23 Mar 2021 06:59  Phos  5.8     03-  Mg     2.0     03-23      PT/INR - ( 23 Mar 2021 06:59 )   PT: 15.2 sec;   INR: 1.33 ratio         PTT - ( 24 Mar 2021 09:28 )  PTT:57.6 sec  Urinalysis Basic - ( 22 Mar 2021 22:41 )    Color: Yellow / Appearance: Clear / S.020 / pH: x  Gluc: x / Ketone: Trace  / Bili: Small / Urobili: Negative mg/dL   Blood: x / Protein: 100 mg/dL / Nitrite: Negative   Leuk Esterase: Trace / RBC: 0-2 /HPF / WBC 0-2   Sq Epi: x / Non Sq Epi: Occasional / Bacteria: x

## 2021-03-25 ENCOUNTER — APPOINTMENT (OUTPATIENT)
Age: 86
End: 2021-03-25

## 2021-03-25 ENCOUNTER — RESULT REVIEW (OUTPATIENT)
Age: 86
End: 2021-03-25

## 2021-03-25 LAB
ALBUMIN SERPL ELPH-MCNC: 2.2 G/DL — LOW (ref 3.3–5.2)
ALBUMIN SERPL ELPH-MCNC: 2.3 G/DL — LOW (ref 3.3–5.2)
ALP SERPL-CCNC: 72 U/L — SIGNIFICANT CHANGE UP (ref 40–120)
ALP SERPL-CCNC: 76 U/L — SIGNIFICANT CHANGE UP (ref 40–120)
ALT FLD-CCNC: 15 U/L — SIGNIFICANT CHANGE UP
ALT FLD-CCNC: 19 U/L — SIGNIFICANT CHANGE UP
ANION GAP SERPL CALC-SCNC: 16 MMOL/L — SIGNIFICANT CHANGE UP (ref 5–17)
ANION GAP SERPL CALC-SCNC: 17 MMOL/L — SIGNIFICANT CHANGE UP (ref 5–17)
ANION GAP SERPL CALC-SCNC: 17 MMOL/L — SIGNIFICANT CHANGE UP (ref 5–17)
APTT BLD: 31 SEC — SIGNIFICANT CHANGE UP (ref 27.5–35.5)
APTT BLD: 31 SEC — SIGNIFICANT CHANGE UP (ref 27.5–35.5)
APTT BLD: 52.2 SEC — HIGH (ref 27.5–35.5)
AST SERPL-CCNC: 45 U/L — HIGH
AST SERPL-CCNC: 66 U/L — HIGH
BASOPHILS # BLD AUTO: 0.03 K/UL — SIGNIFICANT CHANGE UP (ref 0–0.2)
BASOPHILS NFR BLD AUTO: 0.2 % — SIGNIFICANT CHANGE UP (ref 0–2)
BILIRUB SERPL-MCNC: 0.9 MG/DL — SIGNIFICANT CHANGE UP (ref 0.4–2)
BILIRUB SERPL-MCNC: 1.4 MG/DL — SIGNIFICANT CHANGE UP (ref 0.4–2)
BLD GP AB SCN SERPL QL: SIGNIFICANT CHANGE UP
BUN SERPL-MCNC: 28 MG/DL — HIGH (ref 8–20)
BUN SERPL-MCNC: 33 MG/DL — HIGH (ref 8–20)
BUN SERPL-MCNC: 34 MG/DL — HIGH (ref 8–20)
CALCIUM SERPL-MCNC: 7.9 MG/DL — LOW (ref 8.6–10.2)
CALCIUM SERPL-MCNC: 8.4 MG/DL — LOW (ref 8.6–10.2)
CALCIUM SERPL-MCNC: 9.3 MG/DL — SIGNIFICANT CHANGE UP (ref 8.6–10.2)
CHLORIDE SERPL-SCNC: 101 MMOL/L — SIGNIFICANT CHANGE UP (ref 98–107)
CHLORIDE SERPL-SCNC: 102 MMOL/L — SIGNIFICANT CHANGE UP (ref 98–107)
CHLORIDE SERPL-SCNC: 92 MMOL/L — LOW (ref 98–107)
CK MB CFR SERPL CALC: 16.9 NG/ML — HIGH (ref 0–6.7)
CK SERPL-CCNC: 1183 U/L — HIGH (ref 30–200)
CO2 SERPL-SCNC: 18 MMOL/L — LOW (ref 22–29)
CO2 SERPL-SCNC: 22 MMOL/L — SIGNIFICANT CHANGE UP (ref 22–29)
CO2 SERPL-SCNC: 24 MMOL/L — SIGNIFICANT CHANGE UP (ref 22–29)
CREAT SERPL-MCNC: 3.77 MG/DL — HIGH (ref 0.5–1.3)
CREAT SERPL-MCNC: 3.8 MG/DL — HIGH (ref 0.5–1.3)
CREAT SERPL-MCNC: 3.92 MG/DL — HIGH (ref 0.5–1.3)
EOSINOPHIL # BLD AUTO: 0.02 K/UL — SIGNIFICANT CHANGE UP (ref 0–0.5)
EOSINOPHIL NFR BLD AUTO: 0.2 % — SIGNIFICANT CHANGE UP (ref 0–6)
GAS PNL BLDA: SIGNIFICANT CHANGE UP
GLUCOSE BLDC GLUCOMTR-MCNC: 161 MG/DL — HIGH (ref 70–99)
GLUCOSE SERPL-MCNC: 120 MG/DL — HIGH (ref 70–99)
GLUCOSE SERPL-MCNC: 198 MG/DL — HIGH (ref 70–99)
GLUCOSE SERPL-MCNC: 214 MG/DL — HIGH (ref 70–99)
HCT VFR BLD CALC: 20.8 % — CRITICAL LOW (ref 39–50)
HCT VFR BLD CALC: 28.2 % — LOW (ref 39–50)
HCT VFR BLD CALC: 31.6 % — LOW (ref 39–50)
HGB BLD-MCNC: 11.1 G/DL — LOW (ref 13–17)
HGB BLD-MCNC: 6.8 G/DL — CRITICAL LOW (ref 13–17)
HGB BLD-MCNC: 8.6 G/DL — LOW (ref 13–17)
IMM GRANULOCYTES NFR BLD AUTO: 0.7 % — SIGNIFICANT CHANGE UP (ref 0–1.5)
INR BLD: 1.43 RATIO — HIGH (ref 0.88–1.16)
INR BLD: 1.48 RATIO — HIGH (ref 0.88–1.16)
LACTATE SERPL-SCNC: 5.8 MMOL/L — CRITICAL HIGH (ref 0.5–2)
LYMPHOCYTES # BLD AUTO: 0.23 K/UL — LOW (ref 1–3.3)
LYMPHOCYTES # BLD AUTO: 1.8 % — LOW (ref 13–44)
MAGNESIUM SERPL-MCNC: 1.9 MG/DL — SIGNIFICANT CHANGE UP (ref 1.6–2.6)
MAGNESIUM SERPL-MCNC: 2 MG/DL — SIGNIFICANT CHANGE UP (ref 1.6–2.6)
MCHC RBC-ENTMCNC: 27.9 PG — SIGNIFICANT CHANGE UP (ref 27–34)
MCHC RBC-ENTMCNC: 30.5 GM/DL — LOW (ref 32–36)
MCHC RBC-ENTMCNC: 30.9 PG — SIGNIFICANT CHANGE UP (ref 27–34)
MCHC RBC-ENTMCNC: 30.9 PG — SIGNIFICANT CHANGE UP (ref 27–34)
MCHC RBC-ENTMCNC: 32.7 GM/DL — SIGNIFICANT CHANGE UP (ref 32–36)
MCHC RBC-ENTMCNC: 35.1 GM/DL — SIGNIFICANT CHANGE UP (ref 32–36)
MCV RBC AUTO: 88 FL — SIGNIFICANT CHANGE UP (ref 80–100)
MCV RBC AUTO: 91.6 FL — SIGNIFICANT CHANGE UP (ref 80–100)
MCV RBC AUTO: 94.5 FL — SIGNIFICANT CHANGE UP (ref 80–100)
MONOCYTES # BLD AUTO: 1.39 K/UL — HIGH (ref 0–0.9)
MONOCYTES NFR BLD AUTO: 10.8 % — SIGNIFICANT CHANGE UP (ref 2–14)
NEUTROPHILS # BLD AUTO: 11.07 K/UL — HIGH (ref 1.8–7.4)
NEUTROPHILS NFR BLD AUTO: 86.3 % — HIGH (ref 43–77)
NRBC # BLD: 1 /100 WBCS — HIGH (ref 0–0)
PHOSPHATE SERPL-MCNC: 4.3 MG/DL — SIGNIFICANT CHANGE UP (ref 2.4–4.7)
PHOSPHATE SERPL-MCNC: 6 MG/DL — HIGH (ref 2.4–4.7)
PLATELET # BLD AUTO: 208 K/UL — SIGNIFICANT CHANGE UP (ref 150–400)
PLATELET # BLD AUTO: 283 K/UL — SIGNIFICANT CHANGE UP (ref 150–400)
PLATELET # BLD AUTO: 295 K/UL — SIGNIFICANT CHANGE UP (ref 150–400)
POTASSIUM SERPL-MCNC: 4.4 MMOL/L — SIGNIFICANT CHANGE UP (ref 3.5–5.3)
POTASSIUM SERPL-MCNC: 4.6 MMOL/L — SIGNIFICANT CHANGE UP (ref 3.5–5.3)
POTASSIUM SERPL-MCNC: 5.1 MMOL/L — SIGNIFICANT CHANGE UP (ref 3.5–5.3)
POTASSIUM SERPL-SCNC: 4.4 MMOL/L — SIGNIFICANT CHANGE UP (ref 3.5–5.3)
POTASSIUM SERPL-SCNC: 4.6 MMOL/L — SIGNIFICANT CHANGE UP (ref 3.5–5.3)
POTASSIUM SERPL-SCNC: 5.1 MMOL/L — SIGNIFICANT CHANGE UP (ref 3.5–5.3)
PROT SERPL-MCNC: 4.2 G/DL — LOW (ref 6.6–8.7)
PROT SERPL-MCNC: 4.3 G/DL — LOW (ref 6.6–8.7)
PROTHROM AB SERPL-ACNC: 16.3 SEC — HIGH (ref 10.6–13.6)
PROTHROM AB SERPL-ACNC: 16.9 SEC — HIGH (ref 10.6–13.6)
RBC # BLD: 2.2 M/UL — LOW (ref 4.2–5.8)
RBC # BLD: 3.08 M/UL — LOW (ref 4.2–5.8)
RBC # BLD: 3.59 M/UL — LOW (ref 4.2–5.8)
RBC # FLD: 14.3 % — SIGNIFICANT CHANGE UP (ref 10.3–14.5)
RBC # FLD: 15.1 % — HIGH (ref 10.3–14.5)
RBC # FLD: 18.4 % — HIGH (ref 10.3–14.5)
SODIUM SERPL-SCNC: 133 MMOL/L — LOW (ref 135–145)
SODIUM SERPL-SCNC: 137 MMOL/L — SIGNIFICANT CHANGE UP (ref 135–145)
SODIUM SERPL-SCNC: 139 MMOL/L — SIGNIFICANT CHANGE UP (ref 135–145)
TROPONIN T SERPL-MCNC: 0.06 NG/ML — SIGNIFICANT CHANGE UP (ref 0–0.06)
VANCOMYCIN FLD-MCNC: 18.2 UG/ML — SIGNIFICANT CHANGE UP
WBC # BLD: 12.83 K/UL — HIGH (ref 3.8–10.5)
WBC # BLD: 15.88 K/UL — HIGH (ref 3.8–10.5)
WBC # BLD: 16.19 K/UL — HIGH (ref 3.8–10.5)
WBC # FLD AUTO: 12.83 K/UL — HIGH (ref 3.8–10.5)
WBC # FLD AUTO: 15.88 K/UL — HIGH (ref 3.8–10.5)
WBC # FLD AUTO: 16.19 K/UL — HIGH (ref 3.8–10.5)

## 2021-03-25 PROCEDURE — 73590 X-RAY EXAM OF LOWER LEG: CPT | Mod: 26,LT

## 2021-03-25 PROCEDURE — 73551 X-RAY EXAM OF FEMUR 1: CPT | Mod: 26,LT

## 2021-03-25 PROCEDURE — 27372 REMOVAL OF FOREIGN BODY: CPT | Mod: 58,LT

## 2021-03-25 PROCEDURE — 88300 SURGICAL PATH GROSS: CPT | Mod: 26

## 2021-03-25 PROCEDURE — 71045 X-RAY EXAM CHEST 1 VIEW: CPT | Mod: 26

## 2021-03-25 PROCEDURE — 35566 ART BYP FEM-ANT-POST TIB/PRL: CPT | Mod: 58,LT

## 2021-03-25 PROCEDURE — 99233 SBSQ HOSP IP/OBS HIGH 50: CPT

## 2021-03-25 RX ORDER — DEXTROSE 50 % IN WATER 50 %
12.5 SYRINGE (ML) INTRAVENOUS ONCE
Refills: 0 | Status: DISCONTINUED | OUTPATIENT
Start: 2021-03-25 | End: 2021-03-28

## 2021-03-25 RX ORDER — FENTANYL CITRATE 50 UG/ML
1 INJECTION INTRAVENOUS
Qty: 2500 | Refills: 0 | Status: DISCONTINUED | OUTPATIENT
Start: 2021-03-25 | End: 2021-03-26

## 2021-03-25 RX ORDER — DEXTROSE 50 % IN WATER 50 %
25 SYRINGE (ML) INTRAVENOUS ONCE
Refills: 0 | Status: DISCONTINUED | OUTPATIENT
Start: 2021-03-25 | End: 2021-03-28

## 2021-03-25 RX ORDER — INSULIN LISPRO 100/ML
VIAL (ML) SUBCUTANEOUS EVERY 4 HOURS
Refills: 0 | Status: DISCONTINUED | OUTPATIENT
Start: 2021-03-25 | End: 2021-03-28

## 2021-03-25 RX ORDER — GLUCAGON INJECTION, SOLUTION 0.5 MG/.1ML
1 INJECTION, SOLUTION SUBCUTANEOUS ONCE
Refills: 0 | Status: DISCONTINUED | OUTPATIENT
Start: 2021-03-25 | End: 2021-03-28

## 2021-03-25 RX ORDER — PANTOPRAZOLE SODIUM 20 MG/1
40 TABLET, DELAYED RELEASE ORAL DAILY
Refills: 0 | Status: DISCONTINUED | OUTPATIENT
Start: 2021-03-25 | End: 2021-03-26

## 2021-03-25 RX ORDER — CHLORHEXIDINE GLUCONATE 213 G/1000ML
15 SOLUTION TOPICAL EVERY 12 HOURS
Refills: 0 | Status: DISCONTINUED | OUTPATIENT
Start: 2021-03-25 | End: 2021-03-26

## 2021-03-25 RX ORDER — SODIUM CHLORIDE 9 MG/ML
1000 INJECTION, SOLUTION INTRAVENOUS
Refills: 0 | Status: DISCONTINUED | OUTPATIENT
Start: 2021-03-25 | End: 2021-03-25

## 2021-03-25 RX ORDER — SODIUM CHLORIDE 9 MG/ML
1000 INJECTION INTRAMUSCULAR; INTRAVENOUS; SUBCUTANEOUS
Refills: 0 | Status: DISCONTINUED | OUTPATIENT
Start: 2021-03-25 | End: 2021-03-28

## 2021-03-25 RX ORDER — SODIUM CHLORIDE 9 MG/ML
1000 INJECTION, SOLUTION INTRAVENOUS
Refills: 0 | Status: DISCONTINUED | OUTPATIENT
Start: 2021-03-25 | End: 2021-03-28

## 2021-03-25 RX ADMIN — Medication 975 MILLIGRAM(S): at 00:08

## 2021-03-25 RX ADMIN — FENTANYL CITRATE 7.03 MICROGRAM(S)/KG/HR: 50 INJECTION INTRAVENOUS at 20:27

## 2021-03-25 RX ADMIN — Medication 2: at 20:26

## 2021-03-25 RX ADMIN — HEPARIN SODIUM 1300 UNIT(S)/HR: 5000 INJECTION INTRAVENOUS; SUBCUTANEOUS at 08:53

## 2021-03-25 RX ADMIN — Medication 975 MILLIGRAM(S): at 01:21

## 2021-03-25 RX ADMIN — PANTOPRAZOLE SODIUM 40 MILLIGRAM(S): 20 TABLET, DELAYED RELEASE ORAL at 19:00

## 2021-03-25 RX ADMIN — CHLORHEXIDINE GLUCONATE 1 APPLICATION(S): 213 SOLUTION TOPICAL at 06:39

## 2021-03-25 RX ADMIN — SODIUM CHLORIDE 50 MILLILITER(S): 9 INJECTION INTRAMUSCULAR; INTRAVENOUS; SUBCUTANEOUS at 22:21

## 2021-03-25 NOTE — PROGRESS NOTE ADULT - ASSESSMENT
86y Male presenting with severe anemia presenting with rest pain of LLE. Severe plaque burden of LLE on angiogram, unable to cross with wire. Now s/p L CFA to below knee pop bypass w PTFE graft.     PLAN:    - Plan for OR 3/25 for possible LLE thrombectomy, graft revision, or other indicated intervention  - NPO  - HD yesterday  -oral pain control as needed, all narcotic medications stopped due to delirium  -continue Plavix  -heparin drip continued  -continue IV antibiotics

## 2021-03-25 NOTE — PROCEDURE NOTE - SUPERVISORY STATEMENT
Seen in the OR. Patient had capture management ON which allow for a safety margin as low as 0.5 mV @ 0.24 ms in the chronic phase which this patient is in. This likely resulted in LOC during relatively mild hyperkalemia and acidosis.   will adjust pacing output after testing tomorrow  - monitor electrolyte and ABG closely, he is on dialysis. Will consider turning capture management OFF and program higher output if battery allows. Should prokaryotically increased output for any future procedures

## 2021-03-25 NOTE — PROGRESS NOTE ADULT - SUBJECTIVE AND OBJECTIVE BOX
HPI/OVERNIGHT EVENTS: Tmax last night 100.8 came down with tylenol. pre -opped for OR today 3/25 for LLE fem pop bypass revision. no other events overnight    MEDICATIONS  (STANDING):  acetaminophen   Tablet .. 975 milliGRAM(s) Oral every 6 hours  atorvastatin 80 milliGRAM(s) Oral at bedtime  cefepime   IVPB 2000 milliGRAM(s) IV Intermittent <User Schedule>  chlorhexidine 4% Liquid 1 Application(s) Topical <User Schedule>  clopidogrel Tablet 75 milliGRAM(s) Oral daily  DULoxetine 60 milliGRAM(s) Oral daily  epoetin juan-epbx (RETACRIT) Injectable 42434 Unit(s) IV Push <User Schedule>  heparin  Infusion.  Unit(s)/Hr (12 mL/Hr) IV Continuous <Continuous>  hydrALAZINE 50 milliGRAM(s) Oral two times a day  isosorbide   mononitrate ER Tablet (IMDUR) 30 milliGRAM(s) Oral daily  metroNIDAZOLE    Tablet 500 milliGRAM(s) Oral every 8 hours  Nephro-pito 1 Tablet(s) Oral daily  NIFEdipine XL 90 milliGRAM(s) Oral daily  NIFEdipine XL 60 milliGRAM(s) Oral <User Schedule>  pantoprazole    Tablet 40 milliGRAM(s) Oral every 12 hours  sevelamer carbonate 1600 milliGRAM(s) Oral three times a day with meals  sodium chloride 1 Gram(s) Oral two times a day  tiotropium 18 MICROgram(s) Capsule 1 Capsule(s) Inhalation daily  torsemide 20 milliGRAM(s) Oral <User Schedule>    MEDICATIONS  (PRN):      Vital Signs Last 24 Hrs  T(C): 37.3 (25 Mar 2021 05:46), Max: 38.2 (24 Mar 2021 20:01)  T(F): 99.1 (25 Mar 2021 05:46), Max: 100.8 (24 Mar 2021 20:01)  HR: 65 (25 Mar 2021 05:46) (54 - 71)  BP: 166/62 (25 Mar 2021 05:46) (135/68 - 166/62)  BP(mean): --  RR: 18 (25 Mar 2021 05:46) (18 - 18)  SpO2: 91% (25 Mar 2021 05:46) (91% - 99%)    Gen: resting comfortably in bed, nad, confused  Pulm: no increased wob, no conversational dyspnea  Abd: non-distended, soft, non-tender  Ext: left groin notable for hematoma and ecchymosis but remains soft, L groin incision c/d/i, Island dressing.  LLE incision with mild oozing from incisional site. L AT monophasic signals. mottling of LLE from toes to just above ankle. Monophasic signal at AT. biphasic signal noted at popliteal       I&O's Detail    24 Mar 2021 07:01  -  25 Mar 2021 07:00  --------------------------------------------------------  IN:    Heparin Infusion: 144 mL  Total IN: 144 mL    OUT:    Intermittent Catheterization - Urethral (mL): 450 mL    Oral Fluid: 0 mL    Other (mL): 500 mL    Post-Void Residual per Intermittent Catheterization (mL): 0 mL  Total OUT: 950 mL    Total NET: -806 mL          LABS:                        8.6    12.83 )-----------( 295      ( 25 Mar 2021 07:38 )             28.2     03-25    133<L>  |  92<L>  |  28.0<H>  ----------------------------<  120<H>  4.4   |  24.0  |  3.77<H>    Ca    8.4<L>      25 Mar 2021 07:38  Phos  4.3     03-25  Mg     2.0     03-25      PT/INR - ( 24 Mar 2021 14:13 )   PT: 17.3 sec;   INR: 1.52 ratio         PTT - ( 25 Mar 2021 07:38 )  PTT:52.2 sec

## 2021-03-25 NOTE — BRIEF OPERATIVE NOTE - OPERATION/FINDINGS
Pt with possible esophageal candidiasis and brushings taken. 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 Duodenum was entered and found to be normal D1 and D2.
L CFA to below knee pop bypass w PTFE graft, signal in AT and DP post op
LLE bypass revision w explant of PTFE bypass , CFA to AT bypass w cryovein performed
Occlusion of distal Right SFA with collateralization and patent AT. Unable to cross R SFA lesion.

## 2021-03-25 NOTE — BRIEF OPERATIVE NOTE - NSICDXBRIEFPREOP_GEN_ALL_CORE_FT
PRE-OP DIAGNOSIS:  PVD (peripheral vascular disease) 17-Mar-2021 09:39:45  Mike Wyman  
PRE-OP DIAGNOSIS:  PVD (peripheral vascular disease) 17-Mar-2021 09:39:45  Mike Wyman  
PRE-OP DIAGNOSIS:  Occult blood positive stool 15-Mar-2021 10:30:06  Raphael Abraham  History of melena 15-Mar-2021 10:29:56  Raphael Abraham  Upper GI bleed 15-Mar-2021 10:29:46  Raphael Abraham  
PRE-OP DIAGNOSIS:  PVD (peripheral vascular disease) 17-Mar-2021 09:39:45  Mike Wyman

## 2021-03-25 NOTE — CONSULT NOTE ADULT - SUBJECTIVE AND OBJECTIVE BOX
HPI:    86 year old male with the PMH of HTN, HLD, CAD, HFpEF, s/p Right CEA for Carotid artery disease, ESRD on HD 2d/week (M/Fri) via LUE fistula, s/p flecainide w/ ILR placed 6/2020, AS s/p TAVR, PAD with RLE revasc on 10/2020 on Plavix, anemia presented to [facility] ED due to c/o increasing fatigue, weakness and worsening RLE pain. Patient with known history of anemia for which he follows up with Hematology as outpatient. Additionally, patient has undergone multiple EGD and colonoscopies for GI bleeds, patient with documented gastric ulcers and AVMs of the colon. Patient with PVD for which he underwent an angiogram of the RLE with deployment of 3 popliteal stents with Dr. Barbara Sorenson on Oct 22, 2020. Patient denies any pain of the RLE but states that he has constant pain of the LLE foot at rest.       MEDICATIONS  (STANDING):  chlorhexidine 0.12% Liquid 15 milliLiter(s) Oral Mucosa every 12 hours  dextrose 5%. 1000 milliLiter(s) (100 mL/Hr) IV Continuous <Continuous>  dextrose 50% Injectable 25 Gram(s) IV Push once  dextrose 50% Injectable 12.5 Gram(s) IV Push once  dextrose 50% Injectable 25 Gram(s) IV Push once  fentaNYL   Infusion 1 MICROgram(s)/kG/Hr (7.03 mL/Hr) IV Continuous <Continuous>  glucagon  Injectable 1 milliGRAM(s) IntraMuscular once  insulin lispro (ADMELOG) corrective regimen sliding scale   SubCutaneous every 4 hours  pantoprazole  Injectable 40 milliGRAM(s) IV Push daily  sodium chloride 0.9%. 1000 milliLiter(s) (50 mL/Hr) IV Continuous <Continuous>    MEDICATIONS  (PRN):      Drug Dosing Weight  Height (cm): 165.1 (25 Mar 2021 11:43)  Weight (kg): 70.3 (25 Mar 2021 11:43)  BMI (kg/m2): 25.8 (25 Mar 2021 11:43)  BSA (m2): 1.77 (25 Mar 2021 11:43)      PAST MEDICAL & SURGICAL HISTORY:  GI bleeding  due to ulcerated polyps and colonic AVMs    Aortic stenosis  - s/p valve replacement    ESRD on dialysis  HD on Mondays and Fridays    Risk factors for obstructive sleep apnea    Anemia    RICHARDS (dyspnea on exertion)    VT (ventricular tachycardia)    HTN (hypertension)    CAD (coronary artery disease)    Arrhythmia    AV block, 1st degree    DM (diabetes mellitus)  - resolved    History of loop recorder    S/P TAVR (transcatheter aortic valve replacement)    H/O carotid endarterectomy  Right    A-V fistula  left arm 5/2017    H/O angioplasty  2013,  no  intervention    H/O left knee surgery    H/O circumcision  at  age  65        ICU Vital Signs Last 24 Hrs  T(C): 37 (26 Mar 2021 00:00), Max: 38.1 (25 Mar 2021 11:43)  T(F): 98.6 (26 Mar 2021 00:00), Max: 100.5 (25 Mar 2021 11:43)  HR: 60 (26 Mar 2021 00:00) (60 - 66)  BP: 145/51 (25 Mar 2021 23:00) (145/51 - 168/88)  BP(mean): 78 (25 Mar 2021 23:00) (78 - 86)  ABP: 140/51 (26 Mar 2021 00:00) (140/51 - 140/51)  ABP(mean): 77 (26 Mar 2021 00:00) (77 - 77)  RR: 14 (26 Mar 2021 00:00) (14 - 20)  SpO2: 100% (26 Mar 2021 00:00) (91% - 100%)      ABG - ( 25 Mar 2021 20:17 )  pH, Arterial: 7.44  pH, Blood: x     /  pCO2: 34    /  pO2: 119   / HCO3: 24    / Base Excess: -0.5  /  SaO2: 99                  I&O's Detail    24 Mar 2021 07:01  -  25 Mar 2021 07:00  --------------------------------------------------------  IN:    Heparin Infusion: 144 mL  Total IN: 144 mL    OUT:    Intermittent Catheterization - Urethral (mL): 450 mL    Oral Fluid: 0 mL    Other (mL): 500 mL    Post-Void Residual per Intermittent Catheterization (mL): 0 mL  Total OUT: 950 mL    Total NET: -806 mL      25 Mar 2021 07:01  -  26 Mar 2021 01:02  --------------------------------------------------------  IN:    FentaNYL: 67 mL    sodium chloride 0.9%: 150 mL  Total IN: 217 mL    OUT:    Indwelling Catheter - Urethral (mL): 12 mL  Total OUT: 12 mL    Total NET: 205 mL          Mode: AC/ CMV (Assist Control/ Continuous Mandatory Ventilation)  RR (machine): 14  TV (machine): 550  FiO2: 40  PEEP: 5  ITime: 1  MAP: 9  PIP: 28      Physical Exam:    Neurological:  Intubated and sedated.  Non-focal.  Moving all extremities.  No appreciable motor deficits    HEENT: PERRLA, no drainage or redness.     Neck: Neck supple, No JVD    Respiratory:  Mech vented.  Trachea midline, equal chest rise.  Breath Sounds equal bilateral    Cardiovascular: Regular rate & rhythm, normal S1, S2    Gastrointestinal: Soft, non-tender, normal bowel sounds    Extremities: No peripheral edema, No cyanosis, clubbing     Vascular: Equal and normal pulses: 2+ peripheral pulses throughout    Skin: No rashes    LABS:  CBC Full  -  ( 25 Mar 2021 20:29 )  WBC Count : 15.88 K/uL  RBC Count : 3.59 M/uL  Hemoglobin : 11.1 g/dL  Hematocrit : 31.6 %  Platelet Count - Automated : 208 K/uL  Mean Cell Volume : 88.0 fl  Mean Cell Hemoglobin : 30.9 pg  Mean Cell Hemoglobin Concentration : 35.1 gm/dL  Auto Neutrophil # : x  Auto Lymphocyte # : x  Auto Monocyte # : x  Auto Eosinophil # : x  Auto Basophil # : x  Auto Neutrophil % : x  Auto Lymphocyte % : x  Auto Monocyte % : x  Auto Eosinophil % : x  Auto Basophil % : x    03-25    139  |  101  |  34.0<H>  ----------------------------<  214<H>  5.1   |  22.0  |  3.80<H>    Ca    7.9<L>      25 Mar 2021 20:29  Phos  6.0     03-25  Mg     1.9     03-25    TPro  4.3<L>  /  Alb  2.3<L>  /  TBili  1.4  /  DBili  x   /  AST  66<H>  /  ALT  19  /  AlkPhos  76  03-25    PT/INR - ( 25 Mar 2021 20:29 )   PT: 16.3 sec;   INR: 1.43 ratio         PTT - ( 25 Mar 2021 20:29 )  PTT:31.0 sec         HPI:    86 year old male with the PMH of HTN, HLD, CAD, HFpEF, s/p Right CEA for Carotid artery disease, ESRD on HD 2d/week (M/Fri) via LUE fistula, s/p flecainide w/ ILR placed 6/2020, AS s/p TAVR, PAD with RLE revasc on 10/2020 on Plavix, anemia presented to [facility] ED due to c/o increasing fatigue, weakness and worsening RLE pain. Patient with known history of anemia for which he follows up with Hematology as outpatient. Additionally, patient has undergone multiple EGD and colonoscopies for GI bleeds, patient with documented gastric ulcers and AVMs of the colon. Patient with PVD for which he underwent an angiogram of the RLE with deployment of 3 popliteal stents with Dr. Barbara Sorenson on Oct 22, 2020. Patient denies any pain of the RLE but states that he has constant pain of the LLE foot at rest.   Pt to OR for LLE bypass revision w explant of PTFE bypass , CFA to AT bypass w cryovein.  Intra-op patient with 1500 ml of blood loss requiring 8 unit PRBC, 4 unit FFP, 1 donor unit plt.   During procedure, pt lost function of PPM with period of asystole requiring transcutaneous pacing.  Cardiology consulted and pacemaker interrogated in the OR.  Micra Leadless pacemaker with failure to capture. Per anesthesiology, the patient suddenly became asystolic during the procedure. Patient lost blood, and had hyperkalemia and acidosis on ABG. During my interrogation initially there was not capture from the device at maximum output at 5V @ 1.0ms. As the interrogation progressed and the underlying metabolic issues were corrected and cardiology was able to achieve capture through leadless pacemaker and eventually obtained a capture threshold close to baseline as observed as an outpatient.  Pt was placed VVI 60 with output at 5V @ 1.0ms        MEDICATIONS  (STANDING):  chlorhexidine 0.12% Liquid 15 milliLiter(s) Oral Mucosa every 12 hours  dextrose 5%. 1000 milliLiter(s) (100 mL/Hr) IV Continuous <Continuous>  dextrose 50% Injectable 25 Gram(s) IV Push once  dextrose 50% Injectable 12.5 Gram(s) IV Push once  dextrose 50% Injectable 25 Gram(s) IV Push once  fentaNYL   Infusion 1 MICROgram(s)/kG/Hr (7.03 mL/Hr) IV Continuous <Continuous>  glucagon  Injectable 1 milliGRAM(s) IntraMuscular once  insulin lispro (ADMELOG) corrective regimen sliding scale   SubCutaneous every 4 hours  pantoprazole  Injectable 40 milliGRAM(s) IV Push daily  sodium chloride 0.9%. 1000 milliLiter(s) (50 mL/Hr) IV Continuous <Continuous>    MEDICATIONS  (PRN):      Drug Dosing Weight  Height (cm): 165.1 (25 Mar 2021 11:43)  Weight (kg): 70.3 (25 Mar 2021 11:43)  BMI (kg/m2): 25.8 (25 Mar 2021 11:43)  BSA (m2): 1.77 (25 Mar 2021 11:43)      PAST MEDICAL & SURGICAL HISTORY:  GI bleeding  due to ulcerated polyps and colonic AVMs    Aortic stenosis  - s/p valve replacement    ESRD on dialysis  HD on Mondays and Fridays    Risk factors for obstructive sleep apnea    Anemia    RICHARDS (dyspnea on exertion)    VT (ventricular tachycardia)    HTN (hypertension)    CAD (coronary artery disease)    Arrhythmia    AV block, 1st degree    DM (diabetes mellitus)  - resolved    History of loop recorder    S/P TAVR (transcatheter aortic valve replacement)    H/O carotid endarterectomy  Right    A-V fistula  left arm 5/2017    H/O angioplasty  2013,  no  intervention    H/O left knee surgery    H/O circumcision  at  age  65        ICU Vital Signs Last 24 Hrs  T(C): 37 (26 Mar 2021 00:00), Max: 38.1 (25 Mar 2021 11:43)  T(F): 98.6 (26 Mar 2021 00:00), Max: 100.5 (25 Mar 2021 11:43)  HR: 60 (26 Mar 2021 00:00) (60 - 66)  BP: 145/51 (25 Mar 2021 23:00) (145/51 - 168/88)  BP(mean): 78 (25 Mar 2021 23:00) (78 - 86)  ABP: 140/51 (26 Mar 2021 00:00) (140/51 - 140/51)  ABP(mean): 77 (26 Mar 2021 00:00) (77 - 77)  RR: 14 (26 Mar 2021 00:00) (14 - 20)  SpO2: 100% (26 Mar 2021 00:00) (91% - 100%)      ABG - ( 25 Mar 2021 20:17 )  pH, Arterial: 7.44  pH, Blood: x     /  pCO2: 34    /  pO2: 119   / HCO3: 24    / Base Excess: -0.5  /  SaO2: 99                  I&O's Detail    24 Mar 2021 07:01  -  25 Mar 2021 07:00  --------------------------------------------------------  IN:    Heparin Infusion: 144 mL  Total IN: 144 mL    OUT:    Intermittent Catheterization - Urethral (mL): 450 mL    Oral Fluid: 0 mL    Other (mL): 500 mL    Post-Void Residual per Intermittent Catheterization (mL): 0 mL  Total OUT: 950 mL    Total NET: -806 mL      25 Mar 2021 07:01  -  26 Mar 2021 01:02  --------------------------------------------------------  IN:    FentaNYL: 67 mL    sodium chloride 0.9%: 150 mL  Total IN: 217 mL    OUT:    Indwelling Catheter - Urethral (mL): 12 mL  Total OUT: 12 mL    Total NET: 205 mL          Mode: AC/ CMV (Assist Control/ Continuous Mandatory Ventilation)  RR (machine): 14  TV (machine): 550  FiO2: 40  PEEP: 5  ITime: 1  MAP: 9  PIP: 28      Physical Exam:    Neurological:  Intubated and sedated.  Non-focal.  Moving all extremities.  No appreciable motor deficits    HEENT: PERRLA, no drainage or redness.     Neck: Neck supple, No JVD    Respiratory:  Mech vented.  Trachea midline, equal chest rise.  Breath Sounds equal bilateral    Cardiovascular: Regular rate & rhythm, normal S1, S2    Gastrointestinal: Soft, non-tender, normal bowel sounds    Extremities: No peripheral edema, No cyanosis, clubbing     Vascular: Equal and normal pulses: 2+ peripheral pulses throughout    Skin: No rashes    LABS:  CBC Full  -  ( 25 Mar 2021 20:29 )  WBC Count : 15.88 K/uL  RBC Count : 3.59 M/uL  Hemoglobin : 11.1 g/dL  Hematocrit : 31.6 %  Platelet Count - Automated : 208 K/uL  Mean Cell Volume : 88.0 fl  Mean Cell Hemoglobin : 30.9 pg  Mean Cell Hemoglobin Concentration : 35.1 gm/dL  Auto Neutrophil # : x  Auto Lymphocyte # : x  Auto Monocyte # : x  Auto Eosinophil # : x  Auto Basophil # : x  Auto Neutrophil % : x  Auto Lymphocyte % : x  Auto Monocyte % : x  Auto Eosinophil % : x  Auto Basophil % : x    03-25    139  |  101  |  34.0<H>  ----------------------------<  214<H>  5.1   |  22.0  |  3.80<H>    Ca    7.9<L>      25 Mar 2021 20:29  Phos  6.0     03-25  Mg     1.9     03-25    TPro  4.3<L>  /  Alb  2.3<L>  /  TBili  1.4  /  DBili  x   /  AST  66<H>  /  ALT  19  /  AlkPhos  76  03-25    PT/INR - ( 25 Mar 2021 20:29 )   PT: 16.3 sec;   INR: 1.43 ratio         PTT - ( 25 Mar 2021 20:29 )  PTT:31.0 sec         HPI:    86 year old male with the PMH of HTN, HLD, CAD, HFpEF, s/p Right CEA for Carotid artery disease, ESRD on HD 2d/week (M/Fri) via LUE fistula, s/p flecainide w/ ILR placed 6/2020, AS s/p TAVR, PAD with RLE revasc on 10/2020 on Plavix, anemia presented to [facility] ED due to c/o increasing fatigue, weakness and worsening RLE pain. Patient with known history of anemia for which he follows up with Hematology as outpatient. Additionally, patient has undergone multiple EGD and colonoscopies for GI bleeds, patient with documented gastric ulcers and AVMs of the colon. Patient with PVD for which he underwent an angiogram of the RLE with deployment of 3 popliteal stents with Dr. Barbara Sorenson on Oct 22, 2020. Patient denies any pain of the RLE but states that he has constant pain of the LLE foot at rest.   Pt taken to OR on 3/20/21 for L CFA to below knee pop bypass w PTFE graft, signal in AT and DP post op.  Pt thrombosed graft requiring RTOR.  Pt to OR for LLE bypass revision w explant of PTFE bypass , CFA to AT bypass w cryovein.  Intra-op patient with 1500 ml of blood loss requiring 8 unit PRBC, 4 unit FFP, 1 donor unit plt.   During procedure, pt lost function of PPM with period of asystole requiring transcutaneous pacing.  Cardiology consulted and pacemaker interrogated in the OR.  Micra Leadless pacemaker with failure to capture. Per anesthesiology, the patient suddenly became asystolic during the procedure. Patient lost blood, and had hyperkalemia and acidosis on ABG. During my interrogation initially there was not capture from the device at maximum output at 5V @ 1.0ms. As the interrogation progressed and the underlying metabolic issues were corrected and cardiology was able to achieve capture through leadless pacemaker and eventually obtained a capture threshold close to baseline as observed as an outpatient.  Pt was placed VVI 60 with output at 5V @ 1.0ms.  PT taken to PACU post op and SICU consulted.  Pt currently off pressors, stable MAP, Paced rhythm @ 60 pending lab results.          MEDICATIONS  (STANDING):  chlorhexidine 0.12% Liquid 15 milliLiter(s) Oral Mucosa every 12 hours  dextrose 5%. 1000 milliLiter(s) (100 mL/Hr) IV Continuous <Continuous>  dextrose 50% Injectable 25 Gram(s) IV Push once  dextrose 50% Injectable 12.5 Gram(s) IV Push once  dextrose 50% Injectable 25 Gram(s) IV Push once  fentaNYL   Infusion 1 MICROgram(s)/kG/Hr (7.03 mL/Hr) IV Continuous <Continuous>  glucagon  Injectable 1 milliGRAM(s) IntraMuscular once  insulin lispro (ADMELOG) corrective regimen sliding scale   SubCutaneous every 4 hours  pantoprazole  Injectable 40 milliGRAM(s) IV Push daily  sodium chloride 0.9%. 1000 milliLiter(s) (50 mL/Hr) IV Continuous <Continuous>    MEDICATIONS  (PRN):      Drug Dosing Weight  Height (cm): 165.1 (25 Mar 2021 11:43)  Weight (kg): 70.3 (25 Mar 2021 11:43)  BMI (kg/m2): 25.8 (25 Mar 2021 11:43)  BSA (m2): 1.77 (25 Mar 2021 11:43)      PAST MEDICAL & SURGICAL HISTORY:  GI bleeding  due to ulcerated polyps and colonic AVMs    Aortic stenosis  - s/p valve replacement    ESRD on dialysis  HD on Mondays and Fridays    Risk factors for obstructive sleep apnea    Anemia    RICHARDS (dyspnea on exertion)    VT (ventricular tachycardia)    HTN (hypertension)    CAD (coronary artery disease)    Arrhythmia    AV block, 1st degree    DM (diabetes mellitus)  - resolved    History of loop recorder    S/P TAVR (transcatheter aortic valve replacement)    H/O carotid endarterectomy  Right    A-V fistula  left arm 5/2017    H/O angioplasty  2013,  no  intervention    H/O left knee surgery    H/O circumcision  at  age  65        ICU Vital Signs Last 24 Hrs  T(C): 37 (26 Mar 2021 00:00), Max: 38.1 (25 Mar 2021 11:43)  T(F): 98.6 (26 Mar 2021 00:00), Max: 100.5 (25 Mar 2021 11:43)  HR: 60 (26 Mar 2021 00:00) (60 - 66)  BP: 145/51 (25 Mar 2021 23:00) (145/51 - 168/88)  BP(mean): 78 (25 Mar 2021 23:00) (78 - 86)  ABP: 140/51 (26 Mar 2021 00:00) (140/51 - 140/51)  ABP(mean): 77 (26 Mar 2021 00:00) (77 - 77)  RR: 14 (26 Mar 2021 00:00) (14 - 20)  SpO2: 100% (26 Mar 2021 00:00) (91% - 100%)      ABG - ( 25 Mar 2021 20:17 )  pH, Arterial: 7.44  pH, Blood: x     /  pCO2: 34    /  pO2: 119   / HCO3: 24    / Base Excess: -0.5  /  SaO2: 99                  I&O's Detail    24 Mar 2021 07:01  -  25 Mar 2021 07:00  --------------------------------------------------------  IN:    Heparin Infusion: 144 mL  Total IN: 144 mL    OUT:    Intermittent Catheterization - Urethral (mL): 450 mL    Oral Fluid: 0 mL    Other (mL): 500 mL    Post-Void Residual per Intermittent Catheterization (mL): 0 mL  Total OUT: 950 mL    Total NET: -806 mL      25 Mar 2021 07:01  -  26 Mar 2021 01:02  --------------------------------------------------------  IN:    FentaNYL: 67 mL    sodium chloride 0.9%: 150 mL  Total IN: 217 mL    OUT:    Indwelling Catheter - Urethral (mL): 12 mL  Total OUT: 12 mL    Total NET: 205 mL          Mode: AC/ CMV (Assist Control/ Continuous Mandatory Ventilation)  RR (machine): 14  TV (machine): 550  FiO2: 40  PEEP: 5  ITime: 1  MAP: 9  PIP: 28      Physical Exam:    Neurological:  Intubated and sedated.  Non-focal.  Moving all extremities.  No appreciable motor deficits    HEENT: PERRLA, no drainage or redness.     Neck: Neck supple, No JVD    Respiratory:  Mech vented.  Trachea midline, equal chest rise.  Breath Sounds equal bilateral    Cardiovascular: Regular rate & rhythm, normal S1, S2    Gastrointestinal: Soft, non-tender, normal bowel sounds    Extremities: No peripheral edema, No cyanosis, clubbing     Vascular: Equal and normal pulses: 2+ peripheral pulses throughout    Skin: No rashes    LABS:  CBC Full  -  ( 25 Mar 2021 20:29 )  WBC Count : 15.88 K/uL  RBC Count : 3.59 M/uL  Hemoglobin : 11.1 g/dL  Hematocrit : 31.6 %  Platelet Count - Automated : 208 K/uL  Mean Cell Volume : 88.0 fl  Mean Cell Hemoglobin : 30.9 pg  Mean Cell Hemoglobin Concentration : 35.1 gm/dL  Auto Neutrophil # : x  Auto Lymphocyte # : x  Auto Monocyte # : x  Auto Eosinophil # : x  Auto Basophil # : x  Auto Neutrophil % : x  Auto Lymphocyte % : x  Auto Monocyte % : x  Auto Eosinophil % : x  Auto Basophil % : x    03-25    139  |  101  |  34.0<H>  ----------------------------<  214<H>  5.1   |  22.0  |  3.80<H>    Ca    7.9<L>      25 Mar 2021 20:29  Phos  6.0     03-25  Mg     1.9     03-25    TPro  4.3<L>  /  Alb  2.3<L>  /  TBili  1.4  /  DBili  x   /  AST  66<H>  /  ALT  19  /  AlkPhos  76  03-25    PT/INR - ( 25 Mar 2021 20:29 )   PT: 16.3 sec;   INR: 1.43 ratio         PTT - ( 25 Mar 2021 20:29 )  PTT:31.0 sec         HPI:    87 yo male with the PMH of HTN, HLD, CAD, HFpEF, s/p Right CEA for Carotid artery disease, ESRD on HD 2d/week (M/Fri) via LUE fistula, s/p flecainide w/ ILR placed 6/2020, AS s/p TAVR, PAD with RLE revasc on 10/2020 on Plavix, anemia presented to [facility] ED due to c/o increasing fatigue, weakness and worsening RLE pain. Patient with known history of anemia for which he follows up with Hematology as outpatient. Additionally, patient has undergone multiple EGD and colonoscopies for GI bleeds, patient with documented gastric ulcers and AVMs of the colon. Patient with PVD for which he underwent an angiogram of the RLE with deployment of 3 popliteal stents with Dr. Barbara Sorenson on Oct 22, 2020. Patient denies any pain of the RLE but states that he has constant pain of the LLE foot at rest.   Pt taken to OR on 3/20/21 for L CFA to below knee pop bypass w PTFE graft, signal in AT and DP post op.  Pt thrombosed graft requiring RTOR.  Pt to OR for LLE bypass revision w explant of PTFE bypass , CFA to AT bypass w cryovein.  Intra-op patient with 1500 ml of blood loss requiring 8 unit PRBC, 4 unit FFP, 1 donor unit plt.   During procedure, pt lost function of PPM with period of asystole requiring transcutaneous pacing.  Cardiology consulted and pacemaker interrogated in the OR.  Micra Leadless pacemaker with failure to capture. Per anesthesiology, the patient suddenly became asystolic during the procedure. Patient lost blood, and had hyperkalemia and acidosis on ABG. During my interrogation initially there was not capture from the device at maximum output at 5V @ 1.0ms. As the interrogation progressed and the underlying metabolic issues were corrected and cardiology was able to achieve capture through leadless pacemaker and eventually obtained a capture threshold close to baseline as observed as an outpatient.  Pt was placed VVI 60 with output at 5V @ 1.0ms.  PT taken to PACU post op and SICU consulted.  Pt currently off pressors, stable MAP, Paced rhythm @ 60 pending lab results.          MEDICATIONS  (STANDING):  chlorhexidine 0.12% Liquid 15 milliLiter(s) Oral Mucosa every 12 hours  dextrose 5%. 1000 milliLiter(s) (100 mL/Hr) IV Continuous <Continuous>  dextrose 50% Injectable 25 Gram(s) IV Push once  dextrose 50% Injectable 12.5 Gram(s) IV Push once  dextrose 50% Injectable 25 Gram(s) IV Push once  fentaNYL   Infusion 1 MICROgram(s)/kG/Hr (7.03 mL/Hr) IV Continuous <Continuous>  glucagon  Injectable 1 milliGRAM(s) IntraMuscular once  insulin lispro (ADMELOG) corrective regimen sliding scale   SubCutaneous every 4 hours  pantoprazole  Injectable 40 milliGRAM(s) IV Push daily  sodium chloride 0.9%. 1000 milliLiter(s) (50 mL/Hr) IV Continuous <Continuous>    MEDICATIONS  (PRN):      Drug Dosing Weight  Height (cm): 165.1 (25 Mar 2021 11:43)  Weight (kg): 70.3 (25 Mar 2021 11:43)  BMI (kg/m2): 25.8 (25 Mar 2021 11:43)  BSA (m2): 1.77 (25 Mar 2021 11:43)      PAST MEDICAL & SURGICAL HISTORY:  GI bleeding  due to ulcerated polyps and colonic AVMs    Aortic stenosis  - s/p valve replacement    ESRD on dialysis  HD on Mondays and Fridays    Risk factors for obstructive sleep apnea    Anemia    RICHARDS (dyspnea on exertion)    VT (ventricular tachycardia)    HTN (hypertension)    CAD (coronary artery disease)    Arrhythmia    AV block, 1st degree    DM (diabetes mellitus)  - resolved    History of loop recorder    S/P TAVR (transcatheter aortic valve replacement)    H/O carotid endarterectomy  Right    A-V fistula  left arm 5/2017    H/O angioplasty  2013,  no  intervention    H/O left knee surgery    H/O circumcision  at  age  65        ICU Vital Signs Last 24 Hrs  T(C): 37 (26 Mar 2021 00:00), Max: 38.1 (25 Mar 2021 11:43)  T(F): 98.6 (26 Mar 2021 00:00), Max: 100.5 (25 Mar 2021 11:43)  HR: 60 (26 Mar 2021 00:00) (60 - 66)  BP: 145/51 (25 Mar 2021 23:00) (145/51 - 168/88)  BP(mean): 78 (25 Mar 2021 23:00) (78 - 86)  ABP: 140/51 (26 Mar 2021 00:00) (140/51 - 140/51)  ABP(mean): 77 (26 Mar 2021 00:00) (77 - 77)  RR: 14 (26 Mar 2021 00:00) (14 - 20)  SpO2: 100% (26 Mar 2021 00:00) (91% - 100%)      ABG - ( 25 Mar 2021 20:17 )  pH, Arterial: 7.44  pH, Blood: x     /  pCO2: 34    /  pO2: 119   / HCO3: 24    / Base Excess: -0.5  /  SaO2: 99                  I&O's Detail    24 Mar 2021 07:01  -  25 Mar 2021 07:00  --------------------------------------------------------  IN:    Heparin Infusion: 144 mL  Total IN: 144 mL    OUT:    Intermittent Catheterization - Urethral (mL): 450 mL    Oral Fluid: 0 mL    Other (mL): 500 mL    Post-Void Residual per Intermittent Catheterization (mL): 0 mL  Total OUT: 950 mL    Total NET: -806 mL      25 Mar 2021 07:01  -  26 Mar 2021 01:02  --------------------------------------------------------  IN:    FentaNYL: 67 mL    sodium chloride 0.9%: 150 mL  Total IN: 217 mL    OUT:    Indwelling Catheter - Urethral (mL): 12 mL  Total OUT: 12 mL    Total NET: 205 mL          Mode: AC/ CMV (Assist Control/ Continuous Mandatory Ventilation)  RR (machine): 14  TV (machine): 550  FiO2: 40  PEEP: 5  ITime: 1  MAP: 9  PIP: 28      Physical Exam:    Neurological:  Intubated and sedated.  Non-focal.  Moving all extremities.  No appreciable motor deficits    HEENT: PERRLA, no drainage or redness.     Neck: Neck supple, No JVD    Respiratory:  Mech vented.  Trachea midline, equal chest rise.  Breath Sounds equal bilateral    Cardiovascular: Paced rhythm @ 60 bpm, normal S1, S2    Gastrointestinal: Soft, non-tender, normal bowel sounds    Ext: Vascular:  Dopplerable DP to LLE, no signals to PT.  LLE is dusky, ecchymotic.  Cool to touch.      Skin: Surgical incision sites with serosanguinous drainage    LABS:  CBC Full  -  ( 25 Mar 2021 20:29 )  WBC Count : 15.88 K/uL  RBC Count : 3.59 M/uL  Hemoglobin : 11.1 g/dL  Hematocrit : 31.6 %  Platelet Count - Automated : 208 K/uL  Mean Cell Volume : 88.0 fl  Mean Cell Hemoglobin : 30.9 pg  Mean Cell Hemoglobin Concentration : 35.1 gm/dL  Auto Neutrophil # : x  Auto Lymphocyte # : x  Auto Monocyte # : x  Auto Eosinophil # : x  Auto Basophil # : x  Auto Neutrophil % : x  Auto Lymphocyte % : x  Auto Monocyte % : x  Auto Eosinophil % : x  Auto Basophil % : x    03-25    139  |  101  |  34.0<H>  ----------------------------<  214<H>  5.1   |  22.0  |  3.80<H>    Ca    7.9<L>      25 Mar 2021 20:29  Phos  6.0     03-25  Mg     1.9     03-25    TPro  4.3<L>  /  Alb  2.3<L>  /  TBili  1.4  /  DBili  x   /  AST  66<H>  /  ALT  19  /  AlkPhos  76  03-25    PT/INR - ( 25 Mar 2021 20:29 )   PT: 16.3 sec;   INR: 1.43 ratio         PTT - ( 25 Mar 2021 20:29 )  PTT:31.0 sec

## 2021-03-25 NOTE — BRIEF OPERATIVE NOTE - NSICDXBRIEFPROCEDURE_GEN_ALL_CORE_FT
PROCEDURES:  Esophagogastroduodenoscopy (EGD) with argon plasma coagulation (APC) 15-Mar-2021 10:31:19  Raphael Abraham  
PROCEDURES:  Creation, bypass, arterial, femoral to popliteal, using PTFE graft 20-Mar-2021 11:40:58  Papo Hamilton  
PROCEDURES:  Creation, bypass, arterial, femoral to anterior tibial, using non-vein graft 25-Mar-2021 18:16:15  Papo aHmilton  
PROCEDURES:  Fluoroscopic angiography of arteries of right lower extremity with intra-arterial contrast 17-Mar-2021 09:39:34  Mike Wyman

## 2021-03-25 NOTE — PROGRESS NOTE ADULT - SUBJECTIVE AND OBJECTIVE BOX
HPI/ OVERNIGHT EVENTS: Tmax last night 100.8 , OR today 3/25 for LLE fem pop bypass revision. No other events overnight    MEDICATIONS  (STANDING):  acetaminophen   Tablet .. 975 milliGRAM(s) Oral every 6 hours  atorvastatin 80 milliGRAM(s) Oral at bedtime  cefepime   IVPB 2000 milliGRAM(s) IV Intermittent <User Schedule>  chlorhexidine 4% Liquid 1 Application(s) Topical <User Schedule>  clopidogrel Tablet 75 milliGRAM(s) Oral daily  DULoxetine 60 milliGRAM(s) Oral daily  epoetin juan-epbx (RETACRIT) Injectable 87521 Unit(s) IV Push <User Schedule>  heparin  Infusion.  Unit(s)/Hr (12 mL/Hr) IV Continuous <Continuous>  hydrALAZINE 50 milliGRAM(s) Oral two times a day  isosorbide   mononitrate ER Tablet (IMDUR) 30 milliGRAM(s) Oral daily  metroNIDAZOLE    Tablet 500 milliGRAM(s) Oral every 8 hours  Nephro-pito 1 Tablet(s) Oral daily  NIFEdipine XL 90 milliGRAM(s) Oral daily  NIFEdipine XL 60 milliGRAM(s) Oral <User Schedule>  pantoprazole    Tablet 40 milliGRAM(s) Oral every 12 hours  sevelamer carbonate 1600 milliGRAM(s) Oral three times a day with meals  sodium chloride 1 Gram(s) Oral two times a day  tiotropium 18 MICROgram(s) Capsule 1 Capsule(s) Inhalation daily  torsemide 20 milliGRAM(s) Oral <User Schedule>    Vital Signs Last 24 Hrs  T(C): 37.3 (25 Mar 2021 05:46), Max: 38.2 (24 Mar 2021 20:01)  T(F): 99.1 (25 Mar 2021 05:46), Max: 100.8 (24 Mar 2021 20:01)  HR: 65 (25 Mar 2021 05:46) (54 - 71)  BP: 166/62 (25 Mar 2021 05:46) (135/68 - 166/62)  RR: 18 (25 Mar 2021 05:46) (18 - 18)  SpO2: 91% (25 Mar 2021 05:46) (91% - 99%)    Gen: resting comfortably in bed, nad, confused  Pulm: no increased wob, no conversational dyspnea  Abd: non-distended, soft, non-tender  Ext: left groin notable for hematoma and ecchymosis but remains soft, L groin incision c/d/i, [place] dressing.  LLE incision with mild oozing from incisional site. L AT monophasic signals. mottling of LLE from toes to just above ankle. Monophasic signal at AT. biphasic signal noted at popliteal     I&O's Detail    24 Mar 2021 07:01  -  25 Mar 2021 07:00  --------------------------------------------------------  IN:    Heparin Infusion: 144 mL  Total IN: 144 mL    OUT:    Intermittent Catheterization - Urethral (mL): 450 mL    Oral Fluid: 0 mL    Other (mL): 500 mL    Post-Void Residual per Intermittent Catheterization (mL): 0 mL  Total OUT: 950 mL    Total NET: -806 mL    LABS:                        8.6    12.83 )-----------( 295      ( 25 Mar 2021 07:38 )             28.2     03-25    133<L>  |  92<L>  |  28.0<H>  ----------------------------<  120<H>  4.4   |  24.0  |  3.77<H>    Ca    8.4<L>      25 Mar 2021 07:38  Phos  4.3     03-25  Mg     2.0     03-25    PT/ INR - ( 24 Mar 2021 14:13 )   PT: 17.3 sec;   INR: 1.52 ratio      PTT - ( 25 Mar 2021 07:38 )  PTT:52.2 sec    86y Male presenting with severe anemia presenting with rest pain of LLE. Severe plaque burden of LLE on angiogram, unable to cross with wire. Now s/p L CFA to below knee pop bypass w PTFE graft.     PLAN:      - Plan for OR 3/25 for possible LLE thrombectomy, graft revision, or other indicated intervention    - HD yesterday    -On Plavix  -Heparin drip continued  -On  IV antibiotics

## 2021-03-25 NOTE — CONSULT NOTE ADULT - ATTENDING COMMENTS
85 yo M with a PMH of HTN, HLD, CAD, HFpEF, s/p Right CEA for Carotid artery disease, ESRD on HD 2d/week (M/Fri) via LUE fistula, s/p flecainide w/ ILR placed 6/2020, AS s/p TAVR, PAD with RLE revasc on 10/2020 on Plavix. He had thrombosed L CFA and had a Fem  to below knee pop bypass which ultimately represented with acute ischemia necessitating LLE bypass revision w explant of PTFE bypass , CFA to AT bypass with acute blood loss anemia.  Currently Intubated, vented, heparin gtt, fentanyl gtt  GCS 11T off sedation  PUL vented, AC 16/ 550/ 40/ 5--7.49, 33/ 204/26/ 2  CV RRR  GI Benign  MS LLE doppler signal Biphasic, hyperemic, warm and  tender to touch  CPK 1333  PTT 31  Plan   1. Admit to ICU for serial Neurovascular exam to LLE  2. Continue heparin gtt per Vascular to maintain graft patency for PTT > 60  3. Sedation vacation in the AM with SBT and wean vent to extubation-- good oxygenation and ventilation and should tolerate extubation        code [id number]  CCT  50 min

## 2021-03-25 NOTE — BRIEF OPERATIVE NOTE - NSICDXBRIEFPOSTOP_GEN_ALL_CORE_FT
POST-OP DIAGNOSIS:  PVD (peripheral vascular disease) 17-Mar-2021 09:39:57  Mike Wyman  
POST-OP DIAGNOSIS:  PVD (peripheral vascular disease) 17-Mar-2021 09:39:57  Mike Wyman  
POST-OP DIAGNOSIS:  Esophageal candidiasis 15-Mar-2021 10:30:35  Raphael Abraham  Gastric AVM 15-Mar-2021 10:30:19  Raphael Abraham  
POST-OP DIAGNOSIS:  PVD (peripheral vascular disease) 17-Mar-2021 09:39:57  Mike Wyman

## 2021-03-25 NOTE — PROGRESS NOTE ADULT - ASSESSMENT
Patient is an 86y old male with severe anemia presenting with rest pain of LLE. Severe plaque burden of LLE on angiogram today, unable to cross with wire.  s/p bypass femoral-popliteal -left foot cold with mottling-vascular Sx plans for OR 3/25 for revascularization attempt    ESRD on HD- twice weekly schedule (MF )    cont heparin as per vascular Surgery  Continue cefepime after HD  Anemia: Hb below goal; continue AMY transfusion PRBC PRN    BP stable; continue current regimen

## 2021-03-26 LAB
A1C WITH ESTIMATED AVERAGE GLUCOSE RESULT: 5.7 % — HIGH (ref 4–5.6)
ACANTHOCYTES BLD QL SMEAR: SLIGHT — SIGNIFICANT CHANGE UP
ANION GAP SERPL CALC-SCNC: 13 MMOL/L — SIGNIFICANT CHANGE UP (ref 5–17)
ANISOCYTOSIS BLD QL: SLIGHT — SIGNIFICANT CHANGE UP
ANISOCYTOSIS BLD QL: SLIGHT — SIGNIFICANT CHANGE UP
APTT BLD: 31.8 SEC — SIGNIFICANT CHANGE UP (ref 27.5–35.5)
APTT BLD: 32.5 SEC — SIGNIFICANT CHANGE UP (ref 27.5–35.5)
APTT BLD: 32.5 SEC — SIGNIFICANT CHANGE UP (ref 27.5–35.5)
BASOPHILS # BLD AUTO: 0 K/UL — SIGNIFICANT CHANGE UP (ref 0–0.2)
BASOPHILS # BLD AUTO: 0 K/UL — SIGNIFICANT CHANGE UP (ref 0–0.2)
BASOPHILS NFR BLD AUTO: 0 % — SIGNIFICANT CHANGE UP (ref 0–2)
BASOPHILS NFR BLD AUTO: 0 % — SIGNIFICANT CHANGE UP (ref 0–2)
BUN SERPL-MCNC: 40 MG/DL — HIGH (ref 8–20)
BURR CELLS BLD QL SMEAR: PRESENT — SIGNIFICANT CHANGE UP
CALCIUM SERPL-MCNC: 8 MG/DL — LOW (ref 8.6–10.2)
CHLORIDE SERPL-SCNC: 103 MMOL/L — SIGNIFICANT CHANGE UP (ref 98–107)
CK MB CFR SERPL CALC: 17.2 NG/ML — HIGH (ref 0–6.7)
CK MB CFR SERPL CALC: 22.1 NG/ML — HIGH (ref 0–6.7)
CK SERPL-CCNC: 1146 U/L — HIGH (ref 30–200)
CK SERPL-CCNC: 1333 U/L — HIGH (ref 30–200)
CO2 SERPL-SCNC: 23 MMOL/L — SIGNIFICANT CHANGE UP (ref 22–29)
CREAT SERPL-MCNC: 4.16 MG/DL — HIGH (ref 0.5–1.3)
ELLIPTOCYTES BLD QL SMEAR: SLIGHT — SIGNIFICANT CHANGE UP
EOSINOPHIL # BLD AUTO: 0 K/UL — SIGNIFICANT CHANGE UP (ref 0–0.5)
EOSINOPHIL # BLD AUTO: 0 K/UL — SIGNIFICANT CHANGE UP (ref 0–0.5)
EOSINOPHIL NFR BLD AUTO: 0 % — SIGNIFICANT CHANGE UP (ref 0–6)
EOSINOPHIL NFR BLD AUTO: 0 % — SIGNIFICANT CHANGE UP (ref 0–6)
ESTIMATED AVERAGE GLUCOSE: 117 MG/DL — HIGH (ref 68–114)
GAS PNL BLDA: SIGNIFICANT CHANGE UP
GIANT PLATELETS BLD QL SMEAR: PRESENT — SIGNIFICANT CHANGE UP
GLUCOSE BLDC GLUCOMTR-MCNC: 119 MG/DL — HIGH (ref 70–99)
GLUCOSE BLDC GLUCOMTR-MCNC: 124 MG/DL — HIGH (ref 70–99)
GLUCOSE BLDC GLUCOMTR-MCNC: 161 MG/DL — HIGH (ref 70–99)
GLUCOSE BLDC GLUCOMTR-MCNC: 173 MG/DL — HIGH (ref 70–99)
GLUCOSE BLDC GLUCOMTR-MCNC: 175 MG/DL — HIGH (ref 70–99)
GLUCOSE SERPL-MCNC: 171 MG/DL — HIGH (ref 70–99)
HCT VFR BLD CALC: 22.8 % — LOW (ref 39–50)
HCT VFR BLD CALC: 26.9 % — LOW (ref 39–50)
HCT VFR BLD CALC: 29 % — LOW (ref 39–50)
HGB BLD-MCNC: 10.1 G/DL — LOW (ref 13–17)
HGB BLD-MCNC: 8.1 G/DL — LOW (ref 13–17)
HGB BLD-MCNC: 9.4 G/DL — LOW (ref 13–17)
HYPOCHROMIA BLD QL: SLIGHT — SIGNIFICANT CHANGE UP
LYMPHOCYTES # BLD AUTO: 0.15 K/UL — LOW (ref 1–3.3)
LYMPHOCYTES # BLD AUTO: 0.49 K/UL — LOW (ref 1–3.3)
LYMPHOCYTES # BLD AUTO: 0.9 % — LOW (ref 13–44)
LYMPHOCYTES # BLD AUTO: 2.6 % — LOW (ref 13–44)
MACROCYTES BLD QL: SLIGHT — SIGNIFICANT CHANGE UP
MAGNESIUM SERPL-MCNC: 1.9 MG/DL — SIGNIFICANT CHANGE UP (ref 1.6–2.6)
MANUAL SMEAR VERIFICATION: SIGNIFICANT CHANGE UP
MANUAL SMEAR VERIFICATION: SIGNIFICANT CHANGE UP
MCHC RBC-ENTMCNC: 30.2 PG — SIGNIFICANT CHANGE UP (ref 27–34)
MCHC RBC-ENTMCNC: 30.5 PG — SIGNIFICANT CHANGE UP (ref 27–34)
MCHC RBC-ENTMCNC: 31 PG — SIGNIFICANT CHANGE UP (ref 27–34)
MCHC RBC-ENTMCNC: 34.8 GM/DL — SIGNIFICANT CHANGE UP (ref 32–36)
MCHC RBC-ENTMCNC: 34.9 GM/DL — SIGNIFICANT CHANGE UP (ref 32–36)
MCHC RBC-ENTMCNC: 35.5 GM/DL — SIGNIFICANT CHANGE UP (ref 32–36)
MCV RBC AUTO: 86.8 FL — SIGNIFICANT CHANGE UP (ref 80–100)
MCV RBC AUTO: 87.3 FL — SIGNIFICANT CHANGE UP (ref 80–100)
MCV RBC AUTO: 87.4 FL — SIGNIFICANT CHANGE UP (ref 80–100)
MICROCYTES BLD QL: SLIGHT — SIGNIFICANT CHANGE UP
MICROCYTES BLD QL: SLIGHT — SIGNIFICANT CHANGE UP
MONOCYTES # BLD AUTO: 1.59 K/UL — HIGH (ref 0–0.9)
MONOCYTES # BLD AUTO: 2.77 K/UL — HIGH (ref 0–0.9)
MONOCYTES NFR BLD AUTO: 14.8 % — HIGH (ref 2–14)
MONOCYTES NFR BLD AUTO: 9.5 % — SIGNIFICANT CHANGE UP (ref 2–14)
NEUTROPHILS # BLD AUTO: 14.98 K/UL — HIGH (ref 1.8–7.4)
NEUTROPHILS # BLD AUTO: 15.44 K/UL — HIGH (ref 1.8–7.4)
NEUTROPHILS NFR BLD AUTO: 80 % — HIGH (ref 43–77)
NEUTROPHILS NFR BLD AUTO: 82.6 % — HIGH (ref 43–77)
NEUTS BAND # BLD: 9.6 % — HIGH (ref 0–8)
NRBC # BLD: 1 /100 — HIGH (ref 0–0)
OVALOCYTES BLD QL SMEAR: SIGNIFICANT CHANGE UP
OVALOCYTES BLD QL SMEAR: SLIGHT — SIGNIFICANT CHANGE UP
PHOSPHATE SERPL-MCNC: 5.8 MG/DL — HIGH (ref 2.4–4.7)
PLAT MORPH BLD: NORMAL — SIGNIFICANT CHANGE UP
PLAT MORPH BLD: NORMAL — SIGNIFICANT CHANGE UP
PLATELET # BLD AUTO: 152 K/UL — SIGNIFICANT CHANGE UP (ref 150–400)
PLATELET # BLD AUTO: 189 K/UL — SIGNIFICANT CHANGE UP (ref 150–400)
PLATELET # BLD AUTO: 189 K/UL — SIGNIFICANT CHANGE UP (ref 150–400)
PLATELET CLUMP BLD QL SMEAR: SLIGHT
POIKILOCYTOSIS BLD QL AUTO: SIGNIFICANT CHANGE UP
POIKILOCYTOSIS BLD QL AUTO: SLIGHT — SIGNIFICANT CHANGE UP
POLYCHROMASIA BLD QL SMEAR: SIGNIFICANT CHANGE UP
POLYCHROMASIA BLD QL SMEAR: SLIGHT — SIGNIFICANT CHANGE UP
POTASSIUM SERPL-MCNC: 5.1 MMOL/L — SIGNIFICANT CHANGE UP (ref 3.5–5.3)
POTASSIUM SERPL-SCNC: 5.1 MMOL/L — SIGNIFICANT CHANGE UP (ref 3.5–5.3)
RBC # BLD: 2.61 M/UL — LOW (ref 4.2–5.8)
RBC # BLD: 3.08 M/UL — LOW (ref 4.2–5.8)
RBC # BLD: 3.34 M/UL — LOW (ref 4.2–5.8)
RBC # FLD: 14.5 % — SIGNIFICANT CHANGE UP (ref 10.3–14.5)
RBC # FLD: 15 % — HIGH (ref 10.3–14.5)
RBC # FLD: 15.3 % — HIGH (ref 10.3–14.5)
RBC BLD AUTO: ABNORMAL
RBC BLD AUTO: ABNORMAL
SODIUM SERPL-SCNC: 139 MMOL/L — SIGNIFICANT CHANGE UP (ref 135–145)
TROPONIN T SERPL-MCNC: 0.05 NG/ML — SIGNIFICANT CHANGE UP (ref 0–0.06)
TROPONIN T SERPL-MCNC: 0.07 NG/ML — HIGH (ref 0–0.06)
VANCOMYCIN FLD-MCNC: 12.6 UG/ML — SIGNIFICANT CHANGE UP
WBC # BLD: 14.4 K/UL — HIGH (ref 3.8–10.5)
WBC # BLD: 16.72 K/UL — HIGH (ref 3.8–10.5)
WBC # BLD: 18.69 K/UL — HIGH (ref 3.8–10.5)
WBC # FLD AUTO: 14.4 K/UL — HIGH (ref 3.8–10.5)
WBC # FLD AUTO: 16.72 K/UL — HIGH (ref 3.8–10.5)
WBC # FLD AUTO: 18.69 K/UL — HIGH (ref 3.8–10.5)

## 2021-03-26 PROCEDURE — 99223 1ST HOSP IP/OBS HIGH 75: CPT

## 2021-03-26 PROCEDURE — 90937 HEMODIALYSIS REPEATED EVAL: CPT

## 2021-03-26 PROCEDURE — 93279 PRGRMG DEV EVAL PM/LDLS PM: CPT | Mod: 26

## 2021-03-26 PROCEDURE — 99291 CRITICAL CARE FIRST HOUR: CPT

## 2021-03-26 RX ORDER — DEXMEDETOMIDINE HYDROCHLORIDE IN 0.9% SODIUM CHLORIDE 4 UG/ML
0.3 INJECTION INTRAVENOUS
Qty: 200 | Refills: 0 | Status: DISCONTINUED | OUTPATIENT
Start: 2021-03-26 | End: 2021-03-27

## 2021-03-26 RX ORDER — CLOPIDOGREL BISULFATE 75 MG/1
75 TABLET, FILM COATED ORAL DAILY
Refills: 0 | Status: DISCONTINUED | OUTPATIENT
Start: 2021-03-26 | End: 2021-04-03

## 2021-03-26 RX ORDER — MAGNESIUM SULFATE 500 MG/ML
1 VIAL (ML) INJECTION ONCE
Refills: 0 | Status: COMPLETED | OUTPATIENT
Start: 2021-03-26 | End: 2021-03-26

## 2021-03-26 RX ORDER — PHENYLEPHRINE HYDROCHLORIDE 10 MG/ML
0.3 INJECTION INTRAVENOUS
Qty: 40 | Refills: 0 | Status: DISCONTINUED | OUTPATIENT
Start: 2021-03-26 | End: 2021-03-27

## 2021-03-26 RX ORDER — CLOPIDOGREL BISULFATE 75 MG/1
75 TABLET, FILM COATED ORAL DAILY
Refills: 0 | Status: DISCONTINUED | OUTPATIENT
Start: 2021-03-26 | End: 2021-03-26

## 2021-03-26 RX ORDER — HEPARIN SODIUM 5000 [USP'U]/ML
INJECTION INTRAVENOUS; SUBCUTANEOUS
Qty: 25000 | Refills: 0 | Status: DISCONTINUED | OUTPATIENT
Start: 2021-03-26 | End: 2021-03-28

## 2021-03-26 RX ORDER — ACETAMINOPHEN 500 MG
1000 TABLET ORAL ONCE
Refills: 0 | Status: DISCONTINUED | OUTPATIENT
Start: 2021-03-26 | End: 2021-03-27

## 2021-03-26 RX ORDER — CHLORHEXIDINE GLUCONATE 213 G/1000ML
15 SOLUTION TOPICAL EVERY 12 HOURS
Refills: 0 | Status: DISCONTINUED | OUTPATIENT
Start: 2021-03-26 | End: 2021-03-27

## 2021-03-26 RX ORDER — METRONIDAZOLE 500 MG
500 TABLET ORAL EVERY 8 HOURS
Refills: 0 | Status: DISCONTINUED | OUTPATIENT
Start: 2021-03-26 | End: 2021-03-26

## 2021-03-26 RX ORDER — IPRATROPIUM/ALBUTEROL SULFATE 18-103MCG
3 AEROSOL WITH ADAPTER (GRAM) INHALATION EVERY 6 HOURS
Refills: 0 | Status: DISCONTINUED | OUTPATIENT
Start: 2021-03-26 | End: 2021-03-27

## 2021-03-26 RX ORDER — CEFEPIME 1 G/1
2000 INJECTION, POWDER, FOR SOLUTION INTRAMUSCULAR; INTRAVENOUS
Refills: 0 | Status: DISCONTINUED | OUTPATIENT
Start: 2021-03-26 | End: 2021-03-26

## 2021-03-26 RX ORDER — CEFEPIME 1 G/1
1000 INJECTION, POWDER, FOR SOLUTION INTRAMUSCULAR; INTRAVENOUS EVERY 24 HOURS
Refills: 0 | Status: DISCONTINUED | OUTPATIENT
Start: 2021-03-26 | End: 2021-03-26

## 2021-03-26 RX ORDER — PANTOPRAZOLE SODIUM 20 MG/1
40 TABLET, DELAYED RELEASE ORAL EVERY 12 HOURS
Refills: 0 | Status: DISCONTINUED | OUTPATIENT
Start: 2021-03-26 | End: 2021-04-08

## 2021-03-26 RX ADMIN — Medication 3 MILLILITER(S): at 16:17

## 2021-03-26 RX ADMIN — CLOPIDOGREL BISULFATE 75 MILLIGRAM(S): 75 TABLET, FILM COATED ORAL at 11:10

## 2021-03-26 RX ADMIN — DEXMEDETOMIDINE HYDROCHLORIDE IN 0.9% SODIUM CHLORIDE 5.27 MICROGRAM(S)/KG/HR: 4 INJECTION INTRAVENOUS at 05:38

## 2021-03-26 RX ADMIN — PANTOPRAZOLE SODIUM 40 MILLIGRAM(S): 20 TABLET, DELAYED RELEASE ORAL at 05:38

## 2021-03-26 RX ADMIN — Medication 2: at 04:41

## 2021-03-26 RX ADMIN — Medication 500 MILLIGRAM(S): at 02:47

## 2021-03-26 RX ADMIN — HEPARIN SODIUM 1200 UNIT(S)/HR: 5000 INJECTION INTRAVENOUS; SUBCUTANEOUS at 04:08

## 2021-03-26 RX ADMIN — Medication 100 GRAM(S): at 04:42

## 2021-03-26 RX ADMIN — PANTOPRAZOLE SODIUM 40 MILLIGRAM(S): 20 TABLET, DELAYED RELEASE ORAL at 16:59

## 2021-03-26 RX ADMIN — CHLORHEXIDINE GLUCONATE 15 MILLILITER(S): 213 SOLUTION TOPICAL at 16:59

## 2021-03-26 RX ADMIN — HEPARIN SODIUM 1800 UNIT(S)/HR: 5000 INJECTION INTRAVENOUS; SUBCUTANEOUS at 19:44

## 2021-03-26 RX ADMIN — Medication 3 MILLILITER(S): at 20:17

## 2021-03-26 RX ADMIN — CHLORHEXIDINE GLUCONATE 15 MILLILITER(S): 213 SOLUTION TOPICAL at 05:39

## 2021-03-26 RX ADMIN — HEPARIN SODIUM 1500 UNIT(S)/HR: 5000 INJECTION INTRAVENOUS; SUBCUTANEOUS at 13:00

## 2021-03-26 RX ADMIN — DEXMEDETOMIDINE HYDROCHLORIDE IN 0.9% SODIUM CHLORIDE 5.27 MICROGRAM(S)/KG/HR: 4 INJECTION INTRAVENOUS at 20:05

## 2021-03-26 RX ADMIN — Medication 2: at 11:10

## 2021-03-26 RX ADMIN — Medication 2: at 00:36

## 2021-03-26 RX ADMIN — SODIUM CHLORIDE 50 MILLILITER(S): 9 INJECTION INTRAMUSCULAR; INTRAVENOUS; SUBCUTANEOUS at 17:00

## 2021-03-26 RX ADMIN — DEXMEDETOMIDINE HYDROCHLORIDE IN 0.9% SODIUM CHLORIDE 5.27 MICROGRAM(S)/KG/HR: 4 INJECTION INTRAVENOUS at 09:41

## 2021-03-26 NOTE — CONSULT NOTE ADULT - SUBJECTIVE AND OBJECTIVE BOX
87 y/o male with PMHx of HTN, HLD, CAD, HFpEF, s/p Right CEA for Carotid artery disease, ESRD on HD 2d/week (M/Fri) via LUE fistula, frequent PVCs on flecainide, AS s/p TAVR, PAD with RLE revasc on 10/2020 on Plavix, anemia requiring prior transfusions, and paroxysmal AV block s/p leadless pacemaker implant 10/2020, presented with LE pain at rest and anemia, and underwent LE revascularization.   He underwent leadless pacemaker implant on 10/29/20 in the setting of syncope and transient AV block. The pacemaker has been functioning well since implant.   Yesterday in OR for LE revascularization he developed loss of pacemaker capture, resulting in transient ventricular asystole. This occured in the setting of acute blood loss and metabolic derangements, including suspected acidemia and hyperkalemia. The device was urgently interogated, and the pacing threshold was elevated and gradually decreased with stabalization.     He now remain in the PACU intubated and sedated, on IV pressors, but stable. He has consistent ventricular pacing. Pacemaker interrogation this am revealed excellent pacemaker function, with pacing threshold 0.75V@0.24 ms.       PAST MEDICAL & SURGICAL HISTORY:  GI bleeding  due to ulcerated polyps and colonic AVMs    Aortic stenosis  - s/p valve replacement    ESRD on dialysis  HD on Mondays and Fridays    Risk factors for obstructive sleep apnea    Anemia    RICHARDS (dyspnea on exertion)    VT (ventricular tachycardia)    HTN (hypertension)    CAD (coronary artery disease)    Arrhythmia    AV block, 1st degree    DM (diabetes mellitus)  - resolved    History of loop recorder    S/P TAVR (transcatheter aortic valve replacement)    H/O carotid endarterectomy  Right    A-V fistula  left arm 5/2017    H/O angioplasty  2013,  no  intervention    H/O left knee surgery    H/O circumcision  at  age  65        REVIEW OF SYSTEMS: pt intubated, no ROS possible at this time.     MEDICATIONS  (STANDING):  albuterol/ipratropium for Nebulization 3 milliLiter(s) Nebulizer every 6 hours  chlorhexidine 0.12% Liquid 15 milliLiter(s) Oral Mucosa every 12 hours  clopidogrel Tablet 75 milliGRAM(s) Oral daily  dexMEDEtomidine Infusion 0.3 MICROgram(s)/kG/Hr (5.27 mL/Hr) IV Continuous <Continuous>  dextrose 5%. 1000 milliLiter(s) (100 mL/Hr) IV Continuous <Continuous>  dextrose 50% Injectable 12.5 Gram(s) IV Push once  dextrose 50% Injectable 25 Gram(s) IV Push once  dextrose 50% Injectable 25 Gram(s) IV Push once  glucagon  Injectable 1 milliGRAM(s) IntraMuscular once  heparin  Infusion.  Unit(s)/Hr (12 mL/Hr) IV Continuous <Continuous>  insulin lispro (ADMELOG) corrective regimen sliding scale   SubCutaneous every 4 hours  pantoprazole  Injectable 40 milliGRAM(s) IV Push every 12 hours  sodium chloride 0.9%. 1000 milliLiter(s) (50 mL/Hr) IV Continuous <Continuous>    MEDICATIONS  (PRN):  acetaminophen  IVPB .. 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   RRR.   further exam deferred.       LABS:                        8.1    14.40 )-----------( 152      ( 26 Mar 2021 10:36 )             22.8   03-26    139  |  103  |  40.0<H>  ----------------------------<  171<H>  5.1   |  23.0  |  4.16<H>    Ca    8.0<L>      26 Mar 2021 02:38  Phos  5.8     03-26  Mg     1.9     03-26    TPro  4.3<L>  /  Alb  2.3<L>  /  TBili  1.4  /  DBili  x   /  AST  66<H>  /  ALT  19  /  AlkPhos  76  03-25  LIVER FUNCTIONS - ( 25 Mar 2021 20:29 )  Alb: 2.3 g/dL / Pro: 4.3 g/dL / ALK PHOS: 76 U/L / ALT: 19 U/L / AST: 66 U/L / GGT: x           PT/INR - ( 25 Mar 2021 20:29 )   PT: 16.3 sec;   INR: 1.43 ratio         PTT - ( 26 Mar 2021 10:36 )  PTT:32.5 secCARDIAC MARKERS ( 26 Mar 2021 10:37 )  x     / 0.05 ng/mL / x     / x     / x      CARDIAC MARKERS ( 26 Mar 2021 02:38 )  x     / 0.07 ng/mL / 1333 U/L / x     / 22.1 ng/mL  CARDIAC MARKERS ( 25 Mar 2021 20:29 )  x     / 0.06 ng/mL / 1183 U/L / x     / 16.9 ng/mL          RADIOLOGY & ADDITIONAL STUDIES:  < from: TTE Echo Complete w/o Contrast w/ Doppler (03.14.21 @ 11:36) >   1. Moderately enlarged left atrium.   2. There is mild concentric left ventricular hypertrophy.   3. Left ventricular ejection fraction, by visual estimation, is 60 to 65%.   4. Grade II diastolic dysfunction. Elevated mean left atrial pressure.   5. Mildly enlarged right atrium.   6. Mildly enlarged right ventricle. Mildly reduced RV systolic function.   7. Mild mitral stenosis. Moderate mitral valve regurgitation. Elevated mean gradient likely due to volume overload. Repeat study after diuresis to erevaluate mitral valve. If clinically indicated.   8. 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 Well seated valve. Acceptable gradients. No regurgitation.   9. Mild tricuspid regurgitation.  10. Estimated pulmonary artery systolic pressure is 78 mmHg - severe pulmonary hypertension.  11. There is no evidence of pericardial effusion.      < end of copied text >

## 2021-03-26 NOTE — CONSULT NOTE ADULT - ASSESSMENT
87 y/o male with PMHx of HTN, HLD, CAD, HFpEF, s/p Right CEA for Carotid artery disease, ESRD on HD 2d/week (M/Fri) via LUE fistula, frequent PVCs on flecainide, AS s/p TAVR, PAD with RLE revasc on 10/2020 on Plavix, anemia requiring prior transfusions, and paroxysmal AV block s/p leadless pacemaker implant 10/2020, presented with LE pain at rest and anemia, and underwent LE revascularization yesterday, and had intraoperative loss of pacemaker capture.  Very likely that transient pacemaker malfunction occurred as a result of acute metabolic derangements (including acidemia, and hyperkalemia), exacerbated by his renal dysfunction in addition to acute blood loss, which can alter myocardial pacing thresholds.   Currently device appears to be functioning well.     -continue on telemetry  -please call EP immediately if any loss of pacing noted  -would monitor electrolytes and CBC bid, and keep >K>4, Mg>2 as much as possible, as well as treat blood loss promptly  -will repeat device testing periodically.

## 2021-03-26 NOTE — PROGRESS NOTE ADULT - ASSESSMENT
87 yo male with the PMH of HTN, HLD, CAD, HFpEF, s/p Right CEA for Carotid artery disease, ESRD on HD 2d/week (M/Fri) via LUE fistula, s/p flecainide w/ ILR placed 6/2020, AS s/p TAVR, PAD with RLE revasc on 10/2020 on Plavix, anemia with thrombosed L CFA to below knee pop bypass s/p  LLE bypass revision w explant of PTFE bypass , CFA to AT bypass w cryovein complicated by acute blood loss anemia and loss of PPM function intra-op.      Neuro:   - sedation / analgesia with Precedex  Intermittent IV pushes for pain control.    - delirium precautions  - daily sedation holidays    CV:  Failed PPM  - Interrogated by cardiology and capture obtained at this time  - Currently paced at 60 bpm  - Currently off vasopressors  - Continue hemodynamic monitoring  - Maintain MAP > 65    Pulm:   - Mech vented on AC  - Transition to PSV and wean to extubation  - Pulmonary toilet  - AM ABG    GI/Nutrition:   - NPO   - continue bowel reg  - continue protonix bid with PMH of GI bleed    /Renal:   - Acidemia resolved  - Monitor lytes: potassium / phos  - HD 3/26   - carrera for strict I&O    ID:  - Pt was receiving Cefepime, Vanco, flagyl for AMS.  Cefepime and Vanco post dialysis.  Restart flagyl  - Monitor fever curve  - Leukocytosis    Endo:  - monitor blood glucose  - continue ISS    Skin:   - repositioning for DTI prevention while in bed    Heme/DVT Prophylaxis:  -  Received 8 PRBC, 4 FFP, 1 plt  - DVT to LLE with long standing PVD  -  Heparin gtt for full anticoagulation  - ASA and Plavix as per vascular service    Dispo:  - patient remains critically ill and will require SICU admission   85 yo male with the PMH of HTN, HLD, CAD, HFpEF, s/p Right CEA for Carotid artery disease, ESRD on HD 2d/week (M/Fri) via LUE fistula, s/p flecainide w/ ILR placed 6/2020, AS s/p TAVR, PAD with RLE revasc on 10/2020 on Plavix, anemia with thrombosed L CFA to below knee pop bypass s/p  LLE bypass revision w explant of PTFE bypass , CFA to AT bypass w cryovein complicated by acute blood loss anemia and loss of PPM function intra-op.      Neuro:   - sedation / analgesia with Precedex  Intermittent IV pushes for pain control.    - delirium precautions  - daily sedation holidays    CV:  Failed PPM  - Interrogated by cardiology and capture obtained at this time  - Currently paced at 60 bpm  - Currently off vasopressors  - Continue hemodynamic monitoring  - Maintain MAP > 65    Pulm:   [facility]  - Transition to PSV and wean to extubation  - Pulmonary toilet  - AM ABG    GI/Nutrition:   - NPO   - continue bowel reg  - continue protonix bid with PMH of GI bleed    /Renal:   - Acidemia resolved  - Monitor lytes: potassium / phos  - HD 3/26   - carrera for strict I&O    ID:  - Pt was receiving Cefepime, Vanco, flagyl for AMS.  Cefepime and Vanco post dialysis.  Restart flagyl  - Monitor fever curve  - Leukocytosis    Endo:  - monitor blood glucose  - continue ISS    Skin:   - repositioning for DTI prevention while in bed    Heme/DVT Prophylaxis:  -  Received 8 PRBC, 4 FFP, 1 plt  - DVT to LLE with long standing PVD  -  Heparin gtt for full anticoagulation  - ASA and Plavix as per vascular service    Dispo:  - patient remains critically ill and will require SICU admission        SICU Attending Addendum:    Patient was seen on 3/26/2021 during SICU morning rounds.  Patient is intubated. GCS 11T.  Wean off sedation and prepare for weaning trial. ABG on current settings good.  Hemodynamically stable. Off pressors.   ESRD last Dialysis day before yesterday. Repeat Dialysis today.  Restrict IV fluids.  NPO until extubated.  DC antibiotics for now. Pan culture if spikes fever. WBCs WNL. CXR reviewed which showed central infiltrates.  Repeat H/H.  Continue Heparin drip for acute DVT and Plavix for PVD.  Dopplers positive for DP/PT LLE. Continue vascular checks. Watch closely for any signs of compartment syndrome.

## 2021-03-26 NOTE — PROGRESS NOTE ADULT - ASSESSMENT
1) ESRD on HD  2) MBD of renal dx  3) Anemia of renal dx  4) Vol HTN    Plan for HD today once in SICU  3 hours  0.5L UF  dw SICU team

## 2021-03-26 NOTE — PROGRESS NOTE ADULT - ASSESSMENT
86y Male presenting with severe anemia presenting with rest pain of LLE. Severe plaque burden of LLE on angiogram, unable to cross with wire. Now s/p L CFA to below knee pop bypass w PTFE graft. Noted to have LLE new onset mottling of foot to mid calf, taken back to OR 3/25 for explantation of prior PTFE bypass graft with creation of fem-AT bypass using cryovein. Patient remains intubated in ICU care.    PLAN:    - Q4hr neurovasc exam  - pressor support as needed  - Hgb stable on postoperative labs, recommend resuming heparin drip for LLE DVT  -Plavix and ASA held at this time, will discuss resuming 3/26 pending clinical  - rest of care per SICU

## 2021-03-26 NOTE — PROGRESS NOTE ADULT - SUBJECTIVE AND OBJECTIVE BOX
Postoperative Exam: S/P explant of L PTFE, fem AT bypass w/ cryovein    SUBJECTIVE: Patient evaluated at bedside, intubated and sedated. Patient initially requiring pressor support postop, now weaned off and maintaining MAP >65. Stable vascular exam with biphasic L DP and palpable bypass pulse at lateral aspect of left thigh.       MEDICATIONS  (STANDING):  chlorhexidine 0.12% Liquid 15 milliLiter(s) Oral Mucosa every 12 hours  dextrose 5%. 1000 milliLiter(s) (100 mL/Hr) IV Continuous <Continuous>  dextrose 50% Injectable 25 Gram(s) IV Push once  dextrose 50% Injectable 12.5 Gram(s) IV Push once  dextrose 50% Injectable 25 Gram(s) IV Push once  fentaNYL   Infusion 1 MICROgram(s)/kG/Hr (7.03 mL/Hr) IV Continuous <Continuous>  glucagon  Injectable 1 milliGRAM(s) IntraMuscular once  insulin lispro (ADMELOG) corrective regimen sliding scale   SubCutaneous every 4 hours  pantoprazole  Injectable 40 milliGRAM(s) IV Push daily  sodium chloride 0.9%. 1000 milliLiter(s) (50 mL/Hr) IV Continuous <Continuous>    MEDICATIONS  (PRN):      Vital Signs Last 24 Hrs  T(C): 37 (26 Mar 2021 00:00), Max: 38.1 (25 Mar 2021 11:43)  T(F): 98.6 (26 Mar 2021 00:00), Max: 100.5 (25 Mar 2021 11:43)  HR: 60 (26 Mar 2021 00:00) (60 - 66)  BP: 145/51 (25 Mar 2021 23:00) (145/51 - 168/88)  BP(mean): 78 (25 Mar 2021 23:00) (78 - 86)  RR: 14 (26 Mar 2021 00:00) (14 - 20)  SpO2: 100% (26 Mar 2021 00:00) (91% - 100%)    PE  Gen: resting in bed, sedated  Pulm: intubated  Abd: non-distended, soft, non-tender, left groin with old hematoma stable, ecchymosis at Left groin extending to left flank and left lateral thigh, stable. Incision with dressing c/d/i  Ext: L foot with improved color. biphasic L DP. palpable pulse over L venous bypass graft. L calf mildly tense. Incisions with dressings c/d/i.         I&O's Detail    24 Mar 2021 07:01  -  25 Mar 2021 07:00  --------------------------------------------------------  IN:    Heparin Infusion: 144 mL  Total IN: 144 mL    OUT:    Intermittent Catheterization - Urethral (mL): 450 mL    Oral Fluid: 0 mL    Other (mL): 500 mL    Post-Void Residual per Intermittent Catheterization (mL): 0 mL  Total OUT: 950 mL    Total NET: -806 mL      25 Mar 2021 07:01  -  26 Mar 2021 00:58  --------------------------------------------------------  IN:    FentaNYL: 67 mL    sodium chloride 0.9%: 150 mL  Total IN: 217 mL    OUT:    Indwelling Catheter - Urethral (mL): 12 mL  Total OUT: 12 mL    Total NET: 205 mL          LABS:                        11.1   15.88 )-----------( 208      ( 25 Mar 2021 20:29 )             31.6     03-25    139  |  101  |  34.0<H>  ----------------------------<  214<H>  5.1   |  22.0  |  3.80<H>    Ca    7.9<L>      25 Mar 2021 20:29  Phos  6.0     03-25  Mg     1.9     03-25    TPro  4.3<L>  /  Alb  2.3<L>  /  TBili  1.4  /  DBili  x   /  AST  66<H>  /  ALT  19  /  AlkPhos  76  03-25    PT/INR - ( 25 Mar 2021 20:29 )   PT: 16.3 sec;   INR: 1.43 ratio         PTT - ( 25 Mar 2021 20:29 )  PTT:31.0 sec      RADIOLOGY & ADDITIONAL STUDIES:

## 2021-03-26 NOTE — PROGRESS NOTE ADULT - SUBJECTIVE AND OBJECTIVE BOX
[facility] DIVISION OF KIDNEY DISEASES AND HYPERTENSION -- FOLLOW UP NOTE  --------------------------------------------------------------------------------  Chief Complaint:  ESRD HD  24 hour events/subjective:  Seen/examined this AM;  Currently in PACU; post op;  Intubated; apneic; sedated;      PAST HISTORY  --------------------------------------------------------------------------------  No significant changes to PMH, PSH, FHx, SHx, unless otherwise noted    ALLERGIES & MEDICATIONS  --------------------------------------------------------------------------------  Allergies    Plavix (Hives)  Toprol-XL (Rash)    Intolerances      Standing Inpatient Medications  albuterol/ipratropium for Nebulization 3 milliLiter(s) Nebulizer every 6 hours  chlorhexidine 0.12% Liquid 15 milliLiter(s) Oral Mucosa every 12 hours  clopidogrel Tablet 75 milliGRAM(s) Oral daily  dexMEDEtomidine Infusion 0.3 MICROgram(s)/kG/Hr IV Continuous <Continuous>  dextrose 5%. 1000 milliLiter(s) IV Continuous <Continuous>  dextrose 50% Injectable 12.5 Gram(s) IV Push once  dextrose 50% Injectable 25 Gram(s) IV Push once  dextrose 50% Injectable 25 Gram(s) IV Push once  glucagon  Injectable 1 milliGRAM(s) IntraMuscular once  heparin  Infusion.  Unit(s)/Hr IV Continuous <Continuous>  insulin lispro (ADMELOG) corrective regimen sliding scale   SubCutaneous every 4 hours  pantoprazole  Injectable 40 milliGRAM(s) IV Push every 12 hours  sodium chloride 0.9%. 1000 milliLiter(s) IV Continuous <Continuous>    PRN Inpatient Medications  acetaminophen  IVPB .. 1000 milliGRAM(s) IV Intermittent once PRN      REVIEW OF SYSTEMS  --------------------------------------------------------------------------------  Unable to obtain    VITALS/PHYSICAL EXAM  --------------------------------------------------------------------------------  T(C): 36.7 (03-26-21 @ 08:00), Max: 37 (03-26-21 @ 00:00)  HR: 62 (03-26-21 @ 13:00) (60 - 63)  BP: 145/51 (03-25-21 @ 23:00) (145/51 - 166/54)  RR: 14 (03-26-21 @ 13:00) (14 - 16)  SpO2: 100% (03-26-21 @ 13:00) (99% - 100%)  Wt(kg): --  Height (cm): 165.1 (03-25-21 @ 11:43)  Weight (kg): 70.3 (03-25-21 @ 11:43)  BMI (kg/m2): 25.8 (03-25-21 @ 11:43)  BSA (m2): 1.77 (03-25-21 @ 11:43)      03-25-21 @ 07:01  -  03-26-21 @ 07:00  --------------------------------------------------------  IN: 836.6 mL / OUT: 70 mL / NET: 766.6 mL    03-26-21 @ 07:01  -  03-26-21 @ 13:41  --------------------------------------------------------  IN: 442.8 mL / OUT: 15 mL / NET: 427.8 mL      Physical Exam:  	Gen: NAD,   	HEENT: PERRL, supple neck, clear oropharynx  	Pulm: ETT vent  	CV: RRR, S1S2; no rub  	Back: No spinal or CVA tenderness; no sacral edema  	Abd: +BS, soft, nontender/nondistended  	: No suprapubic tenderness  	UE: Warm, FROM, no clubbing, intact strength; no edema; no asterixis  	LE: Warm, FROM, no clubbing, intact strength; no edema  	Neuro: No focal deficits, intact gait  	Psych: Normal affect and mood  	Skin: Warm, without rashes       LABS/STUDIES  --------------------------------------------------------------------------------              8.1    14.40 >-----------<  152      [03-26-21 @ 10:36]              22.8     139  |  103  |  40.0  ----------------------------<  171      [03-26-21 @ 02:38]  5.1   |  23.0  |  4.16        Ca     8.0     [03-26-21 @ 02:38]      Mg     1.9     [03-26-21 @ 02:38]      Phos  5.8     [03-26-21 @ 02:38]    TPro  4.3  /  Alb  2.3  /  TBili  1.4  /  DBili  x   /  AST  66  /  ALT  19  /  AlkPhos  76  [03-25-21 @ 20:29]    PT/INR: PT 16.3 , INR 1.43       [03-25-21 @ 20:29]  PTT: 32.5       [03-26-21 @ 10:36]    Troponin 0.05      [03-26-21 @ 10:37]  CK 1333      [03-26-21 @ 02:38]    Creatinine Trend:  SCr 4.16 [03-26 @ 02:38]  SCr 3.80 [03-25 @ 20:29]  SCr 3.92 [03-25 @ 17:51]  SCr 3.77 [03-25 @ 07:38]  SCr 5.60 [03-24 @ 14:13]    Urinalysis - [03-22-21 @ 22:41]      Color Yellow / Appearance Clear / SG 1.020 / pH 5.0      Gluc 50 / Ketone Trace  / Bili Small / Urobili Negative       Blood Moderate / Protein 100 / Leuk Est Trace / Nitrite Negative      RBC 0-2 / WBC 0-2 / Hyaline  / Gran  / Sq Epi  / Non Sq Epi Occasional / Bacteria       Iron 53, TIBC 266, %sat 20      [08-19-20 @ 06:32]  Ferritin 184      [08-19-20 @ 06:32]  PTH -- (Ca 9.6)      [08-19-20 @ 16:08]   91  Vitamin D (25OH) 26.4      [08-19-20 @ 16:08]  HbA1c 4.9      [08-09-18 @ 05:54]  TSH 2.16      [09-15-20 @ 02:01]

## 2021-03-26 NOTE — PROGRESS NOTE ADULT - SUBJECTIVE AND OBJECTIVE BOX
INTERVAL HPI/OVERNIGHT EVENTS:    Pt taken to OR on 3/20/21 for L CFA to below knee pop bypass w PTFE graft, signal in AT and DP post op.  Pt thrombosed graft requiring RTOR.  Pt to OR for LLE bypass revision w explant of PTFE bypass , CFA to AT bypass w cryovein.  Intra-op patient with 1500 ml of blood loss requiring 8 unit PRBC, 4 unit FFP, 1 donor unit plt.   During procedure, pt lost function of PPM with period of asystole requiring transcutaneous pacing.  Cardiology consulted and pacemaker interrogated in the OR.  Micra Leadless pacemaker with failure to capture. Per anesthesiology, the patient suddenly became asystolic during the procedure. Patient lost blood, and had hyperkalemia and acidosis on ABG. During my interrogation initially there was not capture from the device at maximum output at 5V @ 1.0ms. As the interrogation progressed and the underlying metabolic issues were corrected and cardiology was able to achieve capture through leadless pacemaker and eventually obtained a capture threshold close to baseline as observed as an outpatient.  Pt was placed VVI 60 with output at 5V @ 1.0ms.  PT taken to PACU post op and SICU consulted.  Pt currently off pressors, stable MAP, Paced rhythm @ 60. Lactic cleared with resolution of acidemia.  Potassium 5.  Hgb 11.  Started on heparin gtt as per Vascular recs can start Hep after 2 AM.  Dopplerable signals to Left DP.  Hgb stable 11à10.  Started on Hep gtt with no boluses.  Fentanyl DC’d and placed on precedex to begin vent wean.    MEDICATIONS  (STANDING):  albuterol/ipratropium for Nebulization 3 milliLiter(s) Nebulizer every 6 hours  cefepime   IVPB 1000 milliGRAM(s) IV Intermittent every 24 hours  chlorhexidine 0.12% Liquid 15 milliLiter(s) Oral Mucosa every 12 hours  dexMEDEtomidine Infusion 0.3 MICROgram(s)/kG/Hr (5.27 mL/Hr) IV Continuous <Continuous>  dextrose 5%. 1000 milliLiter(s) (100 mL/Hr) IV Continuous <Continuous>  dextrose 50% Injectable 25 Gram(s) IV Push once  dextrose 50% Injectable 25 Gram(s) IV Push once  dextrose 50% Injectable 12.5 Gram(s) IV Push once  glucagon  Injectable 1 milliGRAM(s) IntraMuscular once  heparin  Infusion.  Unit(s)/Hr (12 mL/Hr) IV Continuous <Continuous>  insulin lispro (ADMELOG) corrective regimen sliding scale   SubCutaneous every 4 hours  metroNIDAZOLE    Tablet 500 milliGRAM(s) Oral every 8 hours  pantoprazole  Injectable 40 milliGRAM(s) IV Push every 12 hours  sodium chloride 0.9%. 1000 milliLiter(s) (50 mL/Hr) IV Continuous <Continuous>    MEDICATIONS  (PRN):  acetaminophen  IVPB .. 1000 milliGRAM(s) IV Intermittent once PRN Mild Pain (1 - 3), Moderate Pain (4 - 6), Severe Pain (7 - 10)      Drug Dosing Weight  Height (cm): 165.1 (25 Mar 2021 11:43)  Weight (kg): 70.3 (25 Mar 2021 11:43)  BMI (kg/m2): 25.8 (25 Mar 2021 11:43)  BSA (m2): 1.77 (25 Mar 2021 11:43)      PAST MEDICAL & SURGICAL HISTORY:  GI bleeding  due to ulcerated polyps and colonic AVMs    Aortic stenosis  - s/p valve replacement    ESRD on dialysis  HD on Mondays and Fridays    Risk factors for obstructive sleep apnea    Anemia    RICHARDS (dyspnea on exertion)    VT (ventricular tachycardia)    HTN (hypertension)    CAD (coronary artery disease)    Arrhythmia    AV block, 1st degree    DM (diabetes mellitus)  - resolved    History of loop recorder    S/P TAVR (transcatheter aortic valve replacement)    H/O carotid endarterectomy  Right    A-V fistula  left arm 5/2017    H/O angioplasty  2013,  no  intervention    H/O left knee surgery    H/O circumcision  at  age  65        ICU Vital Signs Last 24 Hrs  T(C): 37 (26 Mar 2021 00:00), Max: 38.1 (25 Mar 2021 11:43)  T(F): 98.6 (26 Mar 2021 00:00), Max: 100.5 (25 Mar 2021 11:43)  HR: 60 (26 Mar 2021 05:00) (60 - 66)  BP: 145/51 (25 Mar 2021 23:00) (145/51 - 168/88)  BP(mean): 78 (25 Mar 2021 23:00) (71 - 86)  ABP: 176/43 (26 Mar 2021 05:00) (140/51 - 176/48)  ABP(mean): 73 (26 Mar 2021 05:00) (71 - 88)  RR: 14 (26 Mar 2021 05:00) (14 - 20)  SpO2: 100% (26 Mar 2021 05:00) (99% - 100%)      ABG - ( 26 Mar 2021 03:00 )  pH, Arterial: 7.49  pH, Blood: x     /  pCO2: 33    /  pO2: 204   / HCO3: 26    / Base Excess: 2.0   /  SaO2: 100                 I&O's Detail    24 Mar 2021 07:01  -  25 Mar 2021 07:00  --------------------------------------------------------  IN:    Heparin Infusion: 144 mL  Total IN: 144 mL    OUT:    Intermittent Catheterization - Urethral (mL): 450 mL    Oral Fluid: 0 mL    Other (mL): 500 mL    Post-Void Residual per Intermittent Catheterization (mL): 0 mL  Total OUT: 950 mL    Total NET: -806 mL      25 Mar 2021 07:01  -  26 Mar 2021 06:05  --------------------------------------------------------  IN:    Dexmedetomidine: 0.3 mL    FentaNYL: 3.5 mL    FentaNYL: 7 mL    FentaNYL: 127 mL    Heparin Infusion: 36 mL    IV PiggyBack: 100 mL    sodium chloride 0.9%: 500 mL  Total IN: 773.8 mL    OUT:    Indwelling Catheter - Urethral (mL): 20 mL  Total OUT: 20 mL    Total NET: 753.8 mL          Mode: AC/ CMV (Assist Control/ Continuous Mandatory Ventilation)  RR (machine): 14  TV (machine): 480  FiO2: 30  PEEP: 5  ITime: 1  MAP: 9  PIP: 23      Physical Exam:    Neurological:  Intubated and sedated.  Non-focal.  Moving all extremities.  No appreciable motor deficits    HEENT: PERRLA, no drainage or redness.     Neck: Neck supple, No JVD    Respiratory:  Mech vented.  Trachea midline, equal chest rise.  Breath Sounds equal bilateral    Cardiovascular: Paced rhythm @ 60 bpm, normal S1, S2    Gastrointestinal: Soft, non-tender, normal bowel sounds    Ext: Vascular:  Dopplerable DP to LLE, no signals to PT.  LLE is dusky, ecchymotic.  Cool to touch.      Skin: Surgical incision sites with serosanguinous drainage      LABS:  CBC Full  -  ( 26 Mar 2021 02:38 )  WBC Count : 16.72 K/uL  RBC Count : 3.34 M/uL  Hemoglobin : 10.1 g/dL  Hematocrit : 29.0 %  Platelet Count - Automated : 189 K/uL  Mean Cell Volume : 86.8 fl  Mean Cell Hemoglobin : 30.2 pg  Mean Cell Hemoglobin Concentration : 34.8 gm/dL  Auto Neutrophil # : 14.98 K/uL  Auto Lymphocyte # : 0.15 K/uL  Auto Monocyte # : 1.59 K/uL  Auto Eosinophil # : 0.00 K/uL  Auto Basophil # : 0.00 K/uL  Auto Neutrophil % : 80.0 %  Auto Lymphocyte % : 0.9 %  Auto Monocyte % : 9.5 %  Auto Eosinophil % : 0.0 %  Auto Basophil % : 0.0 %    03-26    139  |  103  |  40.0<H>  ----------------------------<  171<H>  5.1   |  23.0  |  4.16<H>    Ca    8.0<L>      26 Mar 2021 02:38  Phos  5.8     03-26  Mg     1.9     03-26    TPro  4.3<L>  /  Alb  2.3<L>  /  TBili  1.4  /  DBili  x   /  AST  66<H>  /  ALT  19  /  AlkPhos  76  03-25    PT/INR - ( 25 Mar 2021 20:29 )   PT: 16.3 sec;   INR: 1.43 ratio         PTT - ( 26 Mar 2021 02:38 )  PTT:31.8 sec

## 2021-03-27 LAB
ACANTHOCYTES BLD QL SMEAR: SLIGHT — SIGNIFICANT CHANGE UP
ALBUMIN SERPL ELPH-MCNC: 1.3 G/DL — LOW (ref 3.3–5.2)
ALP SERPL-CCNC: 62 U/L — SIGNIFICANT CHANGE UP (ref 40–120)
ALT FLD-CCNC: 6 U/L — SIGNIFICANT CHANGE UP
ANION GAP SERPL CALC-SCNC: 14 MMOL/L — SIGNIFICANT CHANGE UP (ref 5–17)
ANION GAP SERPL CALC-SCNC: 9 MMOL/L — SIGNIFICANT CHANGE UP (ref 5–17)
ANISOCYTOSIS BLD QL: SLIGHT — SIGNIFICANT CHANGE UP
APTT BLD: 143.1 SEC — CRITICAL HIGH (ref 27.5–35.5)
APTT BLD: 169.2 SEC — CRITICAL HIGH (ref 27.5–35.5)
APTT BLD: 40.2 SEC — HIGH (ref 27.5–35.5)
APTT BLD: 41.1 SEC — HIGH (ref 27.5–35.5)
APTT BLD: 87.3 SEC — HIGH (ref 27.5–35.5)
AST SERPL-CCNC: 39 U/L — SIGNIFICANT CHANGE UP
BASOPHILS # BLD AUTO: 0 K/UL — SIGNIFICANT CHANGE UP (ref 0–0.2)
BASOPHILS NFR BLD AUTO: 0 % — SIGNIFICANT CHANGE UP (ref 0–2)
BILIRUB SERPL-MCNC: 0.3 MG/DL — LOW (ref 0.4–2)
BUN SERPL-MCNC: 23 MG/DL — HIGH (ref 8–20)
BUN SERPL-MCNC: 35 MG/DL — HIGH (ref 8–20)
BURR CELLS BLD QL SMEAR: PRESENT — SIGNIFICANT CHANGE UP
CALCIUM SERPL-MCNC: 5.2 MG/DL — CRITICAL LOW (ref 8.6–10.2)
CALCIUM SERPL-MCNC: 7.4 MG/DL — LOW (ref 8.6–10.2)
CHLORIDE SERPL-SCNC: 100 MMOL/L — SIGNIFICANT CHANGE UP (ref 98–107)
CHLORIDE SERPL-SCNC: 111 MMOL/L — HIGH (ref 98–107)
CK MB CFR SERPL CALC: 10.2 NG/ML — HIGH (ref 0–6.7)
CK MB CFR SERPL CALC: 9.1 NG/ML — HIGH (ref 0–6.7)
CK SERPL-CCNC: 803 U/L — HIGH (ref 30–200)
CK SERPL-CCNC: 933 U/L — HIGH (ref 30–200)
CO2 SERPL-SCNC: 18 MMOL/L — LOW (ref 22–29)
CO2 SERPL-SCNC: 25 MMOL/L — SIGNIFICANT CHANGE UP (ref 22–29)
COVID-19 SPIKE DOMAIN AB INTERP: POSITIVE
COVID-19 SPIKE DOMAIN ANTIBODY RESULT: >250 U/ML — HIGH
CREAT SERPL-MCNC: 2.62 MG/DL — HIGH (ref 0.5–1.3)
CREAT SERPL-MCNC: 3.66 MG/DL — HIGH (ref 0.5–1.3)
CULTURE RESULTS: SIGNIFICANT CHANGE UP
ELLIPTOCYTES BLD QL SMEAR: SLIGHT — SIGNIFICANT CHANGE UP
EOSINOPHIL # BLD AUTO: 0 K/UL — SIGNIFICANT CHANGE UP (ref 0–0.5)
EOSINOPHIL NFR BLD AUTO: 0 % — SIGNIFICANT CHANGE UP (ref 0–6)
GAS PNL BLDA: SIGNIFICANT CHANGE UP
GLUCOSE BLDC GLUCOMTR-MCNC: 133 MG/DL — HIGH (ref 70–99)
GLUCOSE BLDC GLUCOMTR-MCNC: 137 MG/DL — HIGH (ref 70–99)
GLUCOSE BLDC GLUCOMTR-MCNC: 153 MG/DL — HIGH (ref 70–99)
GLUCOSE BLDC GLUCOMTR-MCNC: 197 MG/DL — HIGH (ref 70–99)
GLUCOSE BLDC GLUCOMTR-MCNC: 197 MG/DL — HIGH (ref 70–99)
GLUCOSE SERPL-MCNC: 141 MG/DL — HIGH (ref 70–99)
GLUCOSE SERPL-MCNC: 92 MG/DL — SIGNIFICANT CHANGE UP (ref 70–99)
HCT VFR BLD CALC: 23.1 % — LOW (ref 39–50)
HCT VFR BLD CALC: 23.2 % — LOW (ref 39–50)
HCT VFR BLD CALC: 23.4 % — LOW (ref 39–50)
HGB BLD-MCNC: 7.8 G/DL — LOW (ref 13–17)
HGB BLD-MCNC: 8 G/DL — LOW (ref 13–17)
HGB BLD-MCNC: 8.1 G/DL — LOW (ref 13–17)
LYMPHOCYTES # BLD AUTO: 0.51 K/UL — LOW (ref 1–3.3)
LYMPHOCYTES # BLD AUTO: 3.5 % — LOW (ref 13–44)
MAGNESIUM SERPL-MCNC: 1.4 MG/DL — LOW (ref 1.6–2.6)
MAGNESIUM SERPL-MCNC: 2.1 MG/DL — SIGNIFICANT CHANGE UP (ref 1.6–2.6)
MANUAL SMEAR VERIFICATION: SIGNIFICANT CHANGE UP
MCHC RBC-ENTMCNC: 30.2 PG — SIGNIFICANT CHANGE UP (ref 27–34)
MCHC RBC-ENTMCNC: 30.6 PG — SIGNIFICANT CHANGE UP (ref 27–34)
MCHC RBC-ENTMCNC: 31.1 PG — SIGNIFICANT CHANGE UP (ref 27–34)
MCHC RBC-ENTMCNC: 33.8 GM/DL — SIGNIFICANT CHANGE UP (ref 32–36)
MCHC RBC-ENTMCNC: 34.5 GM/DL — SIGNIFICANT CHANGE UP (ref 32–36)
MCHC RBC-ENTMCNC: 34.6 GM/DL — SIGNIFICANT CHANGE UP (ref 32–36)
MCV RBC AUTO: 88.3 FL — SIGNIFICANT CHANGE UP (ref 80–100)
MCV RBC AUTO: 89.5 FL — SIGNIFICANT CHANGE UP (ref 80–100)
MCV RBC AUTO: 90.3 FL — SIGNIFICANT CHANGE UP (ref 80–100)
MICROCYTES BLD QL: SLIGHT — SIGNIFICANT CHANGE UP
MONOCYTES # BLD AUTO: 0.63 K/UL — SIGNIFICANT CHANGE UP (ref 0–0.9)
MONOCYTES NFR BLD AUTO: 4.3 % — SIGNIFICANT CHANGE UP (ref 2–14)
NEUTROPHILS # BLD AUTO: 13.53 K/UL — HIGH (ref 1.8–7.4)
NEUTROPHILS NFR BLD AUTO: 92.2 % — HIGH (ref 43–77)
OVALOCYTES BLD QL SMEAR: SLIGHT — SIGNIFICANT CHANGE UP
PHOSPHATE SERPL-MCNC: 3.3 MG/DL — SIGNIFICANT CHANGE UP (ref 2.4–4.7)
PHOSPHATE SERPL-MCNC: 4.9 MG/DL — HIGH (ref 2.4–4.7)
PLAT MORPH BLD: NORMAL — SIGNIFICANT CHANGE UP
PLATELET # BLD AUTO: 176 K/UL — SIGNIFICANT CHANGE UP (ref 150–400)
PLATELET # BLD AUTO: 177 K/UL — SIGNIFICANT CHANGE UP (ref 150–400)
PLATELET # BLD AUTO: 179 K/UL — SIGNIFICANT CHANGE UP (ref 150–400)
POIKILOCYTOSIS BLD QL AUTO: SIGNIFICANT CHANGE UP
POLYCHROMASIA BLD QL SMEAR: SIGNIFICANT CHANGE UP
POTASSIUM SERPL-MCNC: 3 MMOL/L — LOW (ref 3.5–5.3)
POTASSIUM SERPL-MCNC: 4.2 MMOL/L — SIGNIFICANT CHANGE UP (ref 3.5–5.3)
POTASSIUM SERPL-SCNC: 3 MMOL/L — LOW (ref 3.5–5.3)
POTASSIUM SERPL-SCNC: 4.2 MMOL/L — SIGNIFICANT CHANGE UP (ref 3.5–5.3)
PROT SERPL-MCNC: 3.2 G/DL — LOW (ref 6.6–8.7)
RBC # BLD: 2.57 M/UL — LOW (ref 4.2–5.8)
RBC # BLD: 2.58 M/UL — LOW (ref 4.2–5.8)
RBC # BLD: 2.65 M/UL — LOW (ref 4.2–5.8)
RBC # FLD: 15.8 % — HIGH (ref 10.3–14.5)
RBC # FLD: 15.9 % — HIGH (ref 10.3–14.5)
RBC # FLD: 16.1 % — HIGH (ref 10.3–14.5)
RBC BLD AUTO: ABNORMAL
SARS-COV-2 IGG+IGM SERPL QL IA: >250 U/ML — HIGH
SARS-COV-2 IGG+IGM SERPL QL IA: POSITIVE
SODIUM SERPL-SCNC: 138 MMOL/L — SIGNIFICANT CHANGE UP (ref 135–145)
SODIUM SERPL-SCNC: 139 MMOL/L — SIGNIFICANT CHANGE UP (ref 135–145)
SPECIMEN SOURCE: SIGNIFICANT CHANGE UP
SURGICAL PATHOLOGY STUDY: SIGNIFICANT CHANGE UP
WBC # BLD: 14.67 K/UL — HIGH (ref 3.8–10.5)
WBC # BLD: 15.87 K/UL — HIGH (ref 3.8–10.5)
WBC # BLD: 18.1 K/UL — HIGH (ref 3.8–10.5)
WBC # FLD AUTO: 14.67 K/UL — HIGH (ref 3.8–10.5)
WBC # FLD AUTO: 15.87 K/UL — HIGH (ref 3.8–10.5)
WBC # FLD AUTO: 18.1 K/UL — HIGH (ref 3.8–10.5)

## 2021-03-27 PROCEDURE — 93971 EXTREMITY STUDY: CPT | Mod: 26,LT

## 2021-03-27 PROCEDURE — 93279 PRGRMG DEV EVAL PM/LDLS PM: CPT | Mod: 26

## 2021-03-27 PROCEDURE — 99233 SBSQ HOSP IP/OBS HIGH 50: CPT

## 2021-03-27 RX ORDER — ACETAMINOPHEN 500 MG
650 TABLET ORAL EVERY 6 HOURS
Refills: 0 | Status: DISCONTINUED | OUTPATIENT
Start: 2021-03-27 | End: 2021-04-08

## 2021-03-27 RX ORDER — HYDROMORPHONE HYDROCHLORIDE 2 MG/ML
0.5 INJECTION INTRAMUSCULAR; INTRAVENOUS; SUBCUTANEOUS ONCE
Refills: 0 | Status: DISCONTINUED | OUTPATIENT
Start: 2021-03-27 | End: 2021-03-27

## 2021-03-27 RX ORDER — ACETAMINOPHEN 500 MG
1000 TABLET ORAL ONCE
Refills: 0 | Status: COMPLETED | OUTPATIENT
Start: 2021-03-27 | End: 2021-03-27

## 2021-03-27 RX ORDER — OXYCODONE HYDROCHLORIDE 5 MG/1
2.5 TABLET ORAL
Refills: 0 | Status: DISCONTINUED | OUTPATIENT
Start: 2021-03-27 | End: 2021-03-31

## 2021-03-27 RX ADMIN — CLOPIDOGREL BISULFATE 75 MILLIGRAM(S): 75 TABLET, FILM COATED ORAL at 11:14

## 2021-03-27 RX ADMIN — Medication 3 MILLILITER(S): at 04:16

## 2021-03-27 RX ADMIN — DEXMEDETOMIDINE HYDROCHLORIDE IN 0.9% SODIUM CHLORIDE 5.27 MICROGRAM(S)/KG/HR: 4 INJECTION INTRAVENOUS at 06:31

## 2021-03-27 RX ADMIN — Medication 650 MILLIGRAM(S): at 17:36

## 2021-03-27 RX ADMIN — OXYCODONE HYDROCHLORIDE 2.5 MILLIGRAM(S): 5 TABLET ORAL at 21:09

## 2021-03-27 RX ADMIN — Medication 650 MILLIGRAM(S): at 17:03

## 2021-03-27 RX ADMIN — HEPARIN SODIUM 1800 UNIT(S)/HR: 5000 INJECTION INTRAVENOUS; SUBCUTANEOUS at 16:54

## 2021-03-27 RX ADMIN — Medication 400 MILLIGRAM(S): at 13:40

## 2021-03-27 RX ADMIN — Medication 1000 MILLIGRAM(S): at 13:00

## 2021-03-27 RX ADMIN — PANTOPRAZOLE SODIUM 40 MILLIGRAM(S): 20 TABLET, DELAYED RELEASE ORAL at 06:07

## 2021-03-27 RX ADMIN — Medication 650 MILLIGRAM(S): at 23:39

## 2021-03-27 RX ADMIN — DEXMEDETOMIDINE HYDROCHLORIDE IN 0.9% SODIUM CHLORIDE 5.27 MICROGRAM(S)/KG/HR: 4 INJECTION INTRAVENOUS at 02:00

## 2021-03-27 RX ADMIN — HYDROMORPHONE HYDROCHLORIDE 0.5 MILLIGRAM(S): 2 INJECTION INTRAMUSCULAR; INTRAVENOUS; SUBCUTANEOUS at 17:56

## 2021-03-27 RX ADMIN — PANTOPRAZOLE SODIUM 40 MILLIGRAM(S): 20 TABLET, DELAYED RELEASE ORAL at 17:03

## 2021-03-27 RX ADMIN — OXYCODONE HYDROCHLORIDE 2.5 MILLIGRAM(S): 5 TABLET ORAL at 20:09

## 2021-03-27 RX ADMIN — Medication 2: at 14:43

## 2021-03-27 RX ADMIN — HEPARIN SODIUM 2000 UNIT(S)/HR: 5000 INJECTION INTRAVENOUS; SUBCUTANEOUS at 08:40

## 2021-03-27 RX ADMIN — HEPARIN SODIUM 1900 UNIT(S)/HR: 5000 INJECTION INTRAVENOUS; SUBCUTANEOUS at 01:58

## 2021-03-27 RX ADMIN — Medication 2: at 21:42

## 2021-03-27 RX ADMIN — CHLORHEXIDINE GLUCONATE 15 MILLILITER(S): 213 SOLUTION TOPICAL at 06:07

## 2021-03-27 RX ADMIN — HYDROMORPHONE HYDROCHLORIDE 0.5 MILLIGRAM(S): 2 INJECTION INTRAMUSCULAR; INTRAVENOUS; SUBCUTANEOUS at 17:41

## 2021-03-27 RX ADMIN — HEPARIN SODIUM 0 UNIT(S)/HR: 5000 INJECTION INTRAVENOUS; SUBCUTANEOUS at 15:48

## 2021-03-27 NOTE — PROCEDURE NOTE - ADDITIONAL PROCEDURE DETAILS
Pacing threshold today remains stable at 0.75V @ 0.24ms. Turned capture management OFF while hospitalized to provide adequate pacing safety margin while hospitalized. This feature may be re-enabled once he is discharged as an outpatient if clinically desirable. He remains critically ill in the ICU  Setting changes discussed in great detail with patient and daughter at bedside.
Multiple pacing thresholds performed today while patient in PACU. Pacing threshold consistently at 0.75V @ 0.24ms which is what was observed in outpatient device clinic. The device atrial sensing was optimized. The device was restored to VDD 50ppm with improved atrial sensing as demonstrated on telemetry. Outputs reduced to chronic levels to preserve battery longevity.   New longevity: 3.3 years.   Case discussed at length with Dr. Peterson.   Reconsult prn.
Called urgently to the OR during vascular procedure to interrogate Micra Leadless pacemaker due to failure to capture in this critically ill patient. Arrived to see patient requiring external pacing. Per anesthesiology the patient suddenly became asystolic during the procedure. Patient lost blood, and had hyperkalemia and acidosis on ABG. During my interrogation initially there was not capture from the device at maximum output at 5V @ 1.0ms. As the interrogation progressed and the underlying metabolic issues were corrected I was able to achieve capture through leadless pacemaker and eventually obtained a capture threshold close to baseline as observed as an outpatient.   For the interm will leave the device programmed as follows:  VVI 60 with output at 5V @ 1.0ms  Will reprogram the device tomorrow to original settings once patient clinically stabilizes.

## 2021-03-27 NOTE — PROVIDER CONTACT NOTE (CRITICAL VALUE NOTIFICATION) - NAME OF MD/NP/PA/DO NOTIFIED:
PA (Loren)
Dr Colbert aware will order blood
Dr. Gottlieb ([facility])
SUZANNA Colbert
rose mary [facility]

## 2021-03-27 NOTE — SWALLOW BEDSIDE ASSESSMENT ADULT - SWALLOW EVAL: RECOMMENDED FEEDING/EATING TECHNIQUES
Can use teaspoon for administration of honey liquids PRN/crush medication (when feasible)/no straws/oral hygiene/position upright (90 degrees)/small sips/bites

## 2021-03-27 NOTE — SWALLOW BEDSIDE ASSESSMENT ADULT - ORAL PREPARATORY PHASE
improved labial seal on spoon vs. cup rim with honey trials./Reduced oral grading Within functional limits Reduced oral grading Reduced oral grading/Lateral loss of bolus

## 2021-03-27 NOTE — SWALLOW BEDSIDE ASSESSMENT ADULT - PHARYNGEAL PHASE
+facial grimace with swallow/Throat clear post oral intake +facial grimace with swallow, however no overt s/s aspiration +facial grimace with swallow, however no overt s/s aspiration/Within functional limits

## 2021-03-27 NOTE — PROGRESS NOTE ADULT - SUBJECTIVE AND OBJECTIVE BOX
24 HOUR EVENTS: Patient dialyzed, tolerated well. No fluid removed. Off pressors since being dialyzed. On minimal sedation, RASS 0.     SUBJECTIVE/ROS:  [x] Due to intubation, subjective information were not able to be obtained from the patient. History was obtained, to the extent possible, from review of the chart and collateral sources of information.      NEURO  RASS: 0, -1  Exam:  Arousable, follows commands   Meds: acetaminophen  IVPB .. 1000 milliGRAM(s) IV Intermittent once PRN Mild Pain (1 - 3), Moderate Pain (4 - 6), Severe Pain (7 - 10)  dexMEDEtomidine Infusion 0.3 MICROgram(s)/kG/Hr IV Continuous <Continuous>    [x] Adequacy of sedation and pain control has been assessed and adjusted      RESPIRATORY  RR: 13 (03-27-21 @ 01:00) (12 - 20)  SpO2: 99% (03-27-21 @ 01:00) (98% - 100%)  Exam: unlabored, clear to auscultation bilaterally  Mechanical Ventilation: Mode: SIMV (Synchronized Intermittent Mandatory Ventilation), RR (machine): 8, RR (patient): 12, TV (machine): 480, FiO2: 30, PEEP: 5, PS: 10, MAP: 8, PIP: 20  ABG - ( 26 Mar 2021 03:00 )  pH: 7.49  /  pCO2: 33    /  pO2: 204   / HCO3: 26    / Base Excess: 2.0   /  SaO2: 100     Lactate: x        Meds: albuterol/ipratropium for Nebulization 3 milliLiter(s) Nebulizer every 6 hours        CARDIOVASCULAR  HR: 59 (03-27-21 @ 01:00) (57 - 68)  BP: 133/43 (03-26-21 @ 22:00) (82/36 - 137/40)  BP(mean): 70 (03-26-21 @ 22:00) (51 - 70)  ABP: 146/32 (03-27-21 @ 01:00) (83/25 - 187/36)  ABP(mean): 59 (03-27-21 @ 01:00) (40 - 103)    Exam: Chest wall NTTP  Cardiac Rhythm:NSR  Perfusion     Adequate    Mentation  Intubated   Extremities  LLE edematous, tense, NTTP, erythematous, dopplerable DP/PTs. R foot with ischemic skin changes at toe, b/l LE warm to touch, sensation intact, ROM minimal    Meds: phenylephrine    Infusion 0.3 MICROgram(s)/kG/Min IV Continuous <Continuous>        GI/NUTRITION  Exam: abdomen soft, NTTP   Diet: NPO  Meds: pantoprazole  Injectable 40 milliGRAM(s) IV Push every 12 hours      GENITOURINARY  I&O's Detail    03-25 @ 07:01  -  03-26 @ 07:00  --------------------------------------------------------  IN:    Dexmedetomidine: 1.1 mL    FentaNYL: 127 mL    FentaNYL: 3.5 mL    FentaNYL: 7 mL    Heparin Infusion: 48 mL    IV PiggyBack: 100 mL    sodium chloride 0.9%: 550 mL  Total IN: 836.6 mL    OUT:    Indwelling Catheter - Urethral (mL): 20 mL    Nasogastric/Oral tube (mL): 50 mL  Total OUT: 70 mL    Total NET: 766.6 mL      03-26 @ 07:01 - 03-27 @ 01:40  --------------------------------------------------------  IN:    Dexmedetomidine: 74.6 mL    Heparin Infusion: 273 mL    Other (mL): 1000 mL    sodium chloride 0.9%: 900 mL  Total IN: 2247.6 mL    OUT:    Indwelling Catheter - Urethral (mL): 15 mL    Other (mL): 1000 mL    Phenylephrine: 0 mL  Total OUT: 1015 mL    Total NET: 1232.6 mL          03-26    139  |  103  |  40.0<H>  ----------------------------<  171<H>  5.1   |  23.0  |  4.16<H>    Ca    8.0<L>      26 Mar 2021 02:38  Phos  5.8     03-26  Mg     1.9     03-26    TPro  4.3<L>  /  Alb  2.3<L>  /  TBili  1.4  /  DBili  x   /  AST  66<H>  /  ALT  19  /  AlkPhos  76  03-25    Meds: dextrose 5%. 1000 milliLiter(s) IV Continuous <Continuous>  sodium chloride 0.9%. 1000 milliLiter(s) IV Continuous <Continuous>        HEMATOLOGIC  Meds: clopidogrel Tablet 75 milliGRAM(s) Oral daily  heparin  Infusion.  Unit(s)/Hr IV Continuous <Continuous>    [x] VTE Prophylaxis                        9.4    18.69 )-----------( 189      ( 26 Mar 2021 19:09 )             26.9     PT/INR - ( 25 Mar 2021 20:29 )   PT: 16.3 sec;   INR: 1.43 ratio         PTT - ( 27 Mar 2021 01:21 )  PTT:41.1 sec  Transfusion     [ ] PRBC   [ ] Platelets   [ ] FFP   [ ] Cryoprecipitate      INFECTIOUS DISEASES  T(C): 37.5 (03-27-21 @ 00:00), Max: 37.5 (03-27-21 @ 00:00)  Wt(kg): --  WBC Count: 18.69 K/uL (03-26 @ 19:09)  WBC Count: 14.40 K/uL (03-26 @ 10:36)  WBC Count: 16.72 K/uL (03-26 @ 02:38)    Recent Cultures:  Specimen Source: .Blood Blood, 03-22 @ 22:01; Results   No growth at 48 hours; Gram Stain: --; Organism: --    Meds:       ENDOCRINE  Capillary Blood Glucose    Meds: dextrose 50% Injectable 12.5 Gram(s) IV Push once  dextrose 50% Injectable 25 Gram(s) IV Push once  dextrose 50% Injectable 25 Gram(s) IV Push once  glucagon  Injectable 1 milliGRAM(s) IntraMuscular once  insulin lispro (ADMELOG) corrective regimen sliding scale   SubCutaneous every 4 hours        ACCESS DEVICES:  [x] Peripheral IV  [x] Arterial Line      OTHER MEDICATIONS:  chlorhexidine 0.12% Liquid 15 milliLiter(s) Oral Mucosa every 12 hours      CODE STATUS: Full

## 2021-03-27 NOTE — SWALLOW BEDSIDE ASSESSMENT ADULT - SLP GENERAL OBSERVATIONS
Pt received awake but lethargic, requiring cues to maintain arousal, +NGT, Sp02 92-95% on room air, reduced  cognition, 0x1, hoarse vocal quality (just extubated this AM), +c/o throat pain with facial grimace when swallowing (unable to quantify 2* cognition), RN Keeley aware.

## 2021-03-27 NOTE — PROGRESS NOTE ADULT - ASSESSMENT
1) ESRD on HD  2) MBD of renal dx  3) Anemia of renal dx  4) Vol HTN    Tolerated HD yesterday; without ultrafiltration  Holding off on HD today  Tentatively for Monday; will reevaluate daily in SICU    dw SICU team this AM on rounds

## 2021-03-27 NOTE — PROGRESS NOTE ADULT - ASSESSMENT
85 yo male with the PMH of HTN, HLD, CAD, HFpEF, s/p Right CEA for Carotid artery disease, ESRD on HD 2d/week (M/Fri) via LUE fistula, s/p flecainide w/ ILR placed 6/2020, AS s/p TAVR, PAD with RLE revasc on 10/2020 on Plavix, anemia with thrombosed L CFA to below knee pop bypass s/p  LLE bypass revision w explant of PTFE bypass , CFA to AT bypass w cryovein complicated by acute blood loss anemia and loss of PPM function intra-op. Admitted to the SICU for post-operative management      Neuro: Precedex for pain control, delirium precautions, daily sedation holidays  CV: PPM interrogated by cardiology-currently paced at 60 bpm, Currently off vasopressors, Continue hemodynamic monitoring, Maintain MAP > 65, Troponin negative   Pulm: Mech vented on SIMV, Transition to PSV and wean to extubation, Pulmonary toilet  GI/Nutrition: NPO, continue bowel reg, continue protonix bid with PMH of GI bleed  /Renal:  Monitor lytes: potassium / phos, HD M/F, Bladder scan q12hr- intermittent straight cath  ID: Monitor fever curve, Trend Leukocytosis  Endo: monitor blood glucose, continue ISS  Skin: repositioning for DTI prevention while in bed  Heme/DVT Prophylaxis: DVT to LLE with long standing PVD- on Heparin gtt full anticoagulation, PTT q6hr (goal 60-90), Consider repeat duplex LLE to evaluate for thrombus propagation, Plavix, Monitor LLE compartment- CPK downtrending     Dispo:  patient remains critically ill and will continue to require SICU

## 2021-03-27 NOTE — SWALLOW BEDSIDE ASSESSMENT ADULT - SLP PERTINENT HISTORY OF CURRENT PROBLEM
85 yo male with the PMH of HTN, HLD, CAD, HFpEF, s/p Right CEA for Carotid artery disease, ESRD on HD 2d/week (M/Fri) via LUE fistula, s/p flecainide w/ ILR placed 6/2020, AS s/p TAVR, PAD with RLE revasc on 10/2020 on Plavix, anemia with thrombosed L CFA to below knee pop bypass s/p  LLE bypass revision w explant of PTFE bypass , CFA to AT bypass w cryovein complicated by acute blood loss anemia and loss of PPM function intra-op. Admitted to the SICU for post-operative management , s/p extubation this AM

## 2021-03-27 NOTE — PROGRESS NOTE ADULT - SUBJECTIVE AND OBJECTIVE BOX
[facility] DIVISION OF KIDNEY DISEASES AND HYPERTENSION -- FOLLOW UP NOTE  --------------------------------------------------------------------------------  Chief Complaint:  ESRD HD  24 hour events/subjective:  Seen/examined in SICU this AM  Extubated;  NGT in place; on venturi mask    PAST HISTORY  --------------------------------------------------------------------------------  No significant changes to PMH, PSH, FHx, SHx, unless otherwise noted    ALLERGIES & MEDICATIONS  --------------------------------------------------------------------------------  Allergies    Plavix (Hives)  Toprol-XL (Rash)    Intolerances      Standing Inpatient Medications  clopidogrel Tablet 75 milliGRAM(s) Oral daily  dexMEDEtomidine Infusion 0.3 MICROgram(s)/kG/Hr IV Continuous <Continuous>  dextrose 5%. 1000 milliLiter(s) IV Continuous <Continuous>  dextrose 50% Injectable 12.5 Gram(s) IV Push once  dextrose 50% Injectable 25 Gram(s) IV Push once  dextrose 50% Injectable 25 Gram(s) IV Push once  glucagon  Injectable 1 milliGRAM(s) IntraMuscular once  heparin  Infusion.  Unit(s)/Hr IV Continuous <Continuous>  insulin lispro (ADMELOG) corrective regimen sliding scale   SubCutaneous every 4 hours  pantoprazole  Injectable 40 milliGRAM(s) IV Push every 12 hours  phenylephrine    Infusion 0.3 MICROgram(s)/kG/Min IV Continuous <Continuous>  sodium chloride 0.9%. 1000 milliLiter(s) IV Continuous <Continuous>    PRN Inpatient Medications  acetaminophen  IVPB .. 1000 milliGRAM(s) IV Intermittent once PRN      REVIEW OF SYSTEMS  --------------------------------------------------------------------------------  Unable to obtain    VITALS/PHYSICAL EXAM  --------------------------------------------------------------------------------  T(C): 36.6 (03-27-21 @ 11:14), Max: 37.5 (03-27-21 @ 00:00)  HR: 78 (03-27-21 @ 11:00) (56 - 79)  BP: 125/44 (03-27-21 @ 06:00) (82/36 - 137/40)  RR: 19 (03-27-21 @ 11:00) (12 - 20)  SpO2: 95% (03-27-21 @ 11:00) (91% - 100%)  Wt(kg): --        03-26-21 @ 07:01  -  03-27-21 @ 07:00  --------------------------------------------------------  IN: 2716.5 mL / OUT: 1015 mL / NET: 1701.5 mL    03-27-21 @ 07:01  -  03-27-21 @ 11:49  --------------------------------------------------------  IN: 138 mL / OUT: 0 mL / NET: 138 mL      Physical Exam:  	Gen: NAD,   	HEENT: venturi mask  	Pulm: dec BS  	CV: RRR, S1S2; no rub  	Back: No spinal or CVA tenderness; no sacral edema  	Abd: +BS, soft, nontender/nondistended  	: No suprapubic tenderness  	UE: Warm, FROM, no clubbing, intact strength; no edema; no asterixis  	LE: Warm, FROM, no clubbing, intact strength; no edema  	Neuro: No focal deficits, intact gait  	Psych: Normal affect and mood  	Skin: Warm, without rashes    LABS/STUDIES  --------------------------------------------------------------------------------              8.0    18.10 >-----------<  177      [03-27-21 @ 11:34]              23.2     139  |  100  |  35.0  ----------------------------<  141      [03-27-21 @ 08:11]  4.2   |  25.0  |  3.66        Ca     7.4     [03-27-21 @ 08:11]      Mg     2.1     [03-27-21 @ 08:11]      Phos  4.9     [03-27-21 @ 08:11]    TPro  3.2  /  Alb  1.3  /  TBili  0.3  /  DBili  x   /  AST  39  /  ALT  6   /  AlkPhos  62  [03-27-21 @ 06:11]    PT/INR: PT 16.3 , INR 1.43       [03-25-21 @ 20:29]  PTT: 87.3       [03-27-21 @ 08:11]    Troponin 0.05      [03-26-21 @ 10:37]        [03-27-21 @ 08:11]    Creatinine Trend:  SCr 3.66 [03-27 @ 08:11]  SCr 2.62 [03-27 @ 06:11]  SCr 4.16 [03-26 @ 02:38]  SCr 3.80 [03-25 @ 20:29]  SCr 3.92 [03-25 @ 17:51]    Urinalysis - [03-22-21 @ 22:41]      Color Yellow / Appearance Clear / SG 1.020 / pH 5.0      Gluc 50 / Ketone Trace  / Bili Small / Urobili Negative       Blood Moderate / Protein 100 / Leuk Est Trace / Nitrite Negative      RBC 0-2 / WBC 0-2 / Hyaline  / Gran  / Sq Epi  / Non Sq Epi Occasional / Bacteria       Iron 53, TIBC 266, %sat 20      [08-19-20 @ 06:32]  Ferritin 184      [08-19-20 @ 06:32]  PTH -- (Ca 9.6)      [08-19-20 @ 16:08]   91  Vitamin D (25OH) 26.4      [08-19-20 @ 16:08]  HbA1c 4.9      [08-09-18 @ 05:54]  TSH 2.16      [09-15-20 @ 02:01]

## 2021-03-27 NOTE — PROGRESS NOTE ADULT - SUBJECTIVE AND OBJECTIVE BOX
intubated and sedated   opening eyes , moving all 4 ext   LLE foot warm, compartments soft,   HD yesterday      Gen NAD intubated  CTA  abd s nt nd  LUE fistula w thrill   LLE warm, compartments soft, lateral pulse felt over graft on lateral knee, dressings CDI, foot warm, biphasic DP signal

## 2021-03-27 NOTE — PROCEDURE NOTE - SUPERVISORY STATEMENT
Turned capture management OFF. In this particular device only 0.5 mv @ 0.24 ms safety margin is provided and this patient is a dialysis patient so maybe prone to acute changes in safety threshold and so keeping capture management OFF may not be unreasonable

## 2021-03-27 NOTE — SWALLOW BEDSIDE ASSESSMENT ADULT - SWALLOW EVAL: DIAGNOSIS
Mild oral dysphagia confounded by reduced cognition and edentulous state. Suspect pharyngeal dysphagia with thin, nectar liquids, however pharyngeal stage appears functional for puree and honey thickened liquids.

## 2021-03-27 NOTE — SWALLOW BEDSIDE ASSESSMENT ADULT - ORAL PHASE
Within functional limits Mildly reduced attention to bolus, benefitted from verbal cues/Delayed oral transit time Delayed oral transit time suspected posterior loss

## 2021-03-27 NOTE — SWALLOW BEDSIDE ASSESSMENT ADULT - ASR SWALLOW ASPIRATION MONITOR
change of breathing pattern/oral hygiene/position upright (90Y)/cough/gurgly voice/fever/pneumonia/throat clearing

## 2021-03-28 LAB
ANION GAP SERPL CALC-SCNC: 14 MMOL/L — SIGNIFICANT CHANGE UP (ref 5–17)
ANISOCYTOSIS BLD QL: SLIGHT — SIGNIFICANT CHANGE UP
APTT BLD: 67.5 SEC — HIGH (ref 27.5–35.5)
APTT BLD: 71.4 SEC — HIGH (ref 27.5–35.5)
APTT BLD: 93.8 SEC — HIGH (ref 27.5–35.5)
BASOPHILS # BLD AUTO: 0 K/UL — SIGNIFICANT CHANGE UP (ref 0–0.2)
BASOPHILS NFR BLD AUTO: 0 % — SIGNIFICANT CHANGE UP (ref 0–2)
BUN SERPL-MCNC: 47 MG/DL — HIGH (ref 8–20)
CALCIUM SERPL-MCNC: 7.6 MG/DL — LOW (ref 8.6–10.2)
CHLORIDE SERPL-SCNC: 101 MMOL/L — SIGNIFICANT CHANGE UP (ref 98–107)
CK MB CFR SERPL CALC: 8.5 NG/ML — HIGH (ref 0–6.7)
CK SERPL-CCNC: 1004 U/L — HIGH (ref 30–200)
CO2 SERPL-SCNC: 24 MMOL/L — SIGNIFICANT CHANGE UP (ref 22–29)
CREAT SERPL-MCNC: 4.48 MG/DL — HIGH (ref 0.5–1.3)
ELLIPTOCYTES BLD QL SMEAR: SLIGHT — SIGNIFICANT CHANGE UP
EOSINOPHIL # BLD AUTO: 0 K/UL — SIGNIFICANT CHANGE UP (ref 0–0.5)
EOSINOPHIL NFR BLD AUTO: 0 % — SIGNIFICANT CHANGE UP (ref 0–6)
GIANT PLATELETS BLD QL SMEAR: PRESENT — SIGNIFICANT CHANGE UP
GLUCOSE BLDC GLUCOMTR-MCNC: 151 MG/DL — HIGH (ref 70–99)
GLUCOSE BLDC GLUCOMTR-MCNC: 154 MG/DL — HIGH (ref 70–99)
GLUCOSE BLDC GLUCOMTR-MCNC: 201 MG/DL — HIGH (ref 70–99)
GLUCOSE BLDC GLUCOMTR-MCNC: 214 MG/DL — HIGH (ref 70–99)
GLUCOSE BLDC GLUCOMTR-MCNC: 239 MG/DL — HIGH (ref 70–99)
GLUCOSE SERPL-MCNC: 199 MG/DL — HIGH (ref 70–99)
HCT VFR BLD CALC: 22.4 % — LOW (ref 39–50)
HCT VFR BLD CALC: 23.3 % — LOW (ref 39–50)
HGB BLD-MCNC: 7.5 G/DL — LOW (ref 13–17)
HGB BLD-MCNC: 7.8 G/DL — LOW (ref 13–17)
LYMPHOCYTES # BLD AUTO: 0.72 K/UL — LOW (ref 1–3.3)
LYMPHOCYTES # BLD AUTO: 3.5 % — LOW (ref 13–44)
MAGNESIUM SERPL-MCNC: 2.1 MG/DL — SIGNIFICANT CHANGE UP (ref 1.8–2.6)
MANUAL SMEAR VERIFICATION: SIGNIFICANT CHANGE UP
MCHC RBC-ENTMCNC: 30.4 PG — SIGNIFICANT CHANGE UP (ref 27–34)
MCHC RBC-ENTMCNC: 30.5 PG — SIGNIFICANT CHANGE UP (ref 27–34)
MCHC RBC-ENTMCNC: 33.5 GM/DL — SIGNIFICANT CHANGE UP (ref 32–36)
MCHC RBC-ENTMCNC: 33.5 GM/DL — SIGNIFICANT CHANGE UP (ref 32–36)
MCV RBC AUTO: 90.7 FL — SIGNIFICANT CHANGE UP (ref 80–100)
MCV RBC AUTO: 91.1 FL — SIGNIFICANT CHANGE UP (ref 80–100)
MONOCYTES # BLD AUTO: 0.88 K/UL — SIGNIFICANT CHANGE UP (ref 0–0.9)
MONOCYTES NFR BLD AUTO: 4.3 % — SIGNIFICANT CHANGE UP (ref 2–14)
NEUTROPHILS # BLD AUTO: 18.94 K/UL — HIGH (ref 1.8–7.4)
NEUTROPHILS NFR BLD AUTO: 91.3 % — HIGH (ref 43–77)
NEUTS BAND # BLD: 0.9 % — SIGNIFICANT CHANGE UP (ref 0–8)
PHOSPHATE SERPL-MCNC: 4.5 MG/DL — SIGNIFICANT CHANGE UP (ref 2.4–4.7)
PLAT MORPH BLD: NORMAL — SIGNIFICANT CHANGE UP
PLATELET # BLD AUTO: 202 K/UL — SIGNIFICANT CHANGE UP (ref 150–400)
PLATELET # BLD AUTO: 204 K/UL — SIGNIFICANT CHANGE UP (ref 150–400)
POIKILOCYTOSIS BLD QL AUTO: SLIGHT — SIGNIFICANT CHANGE UP
POLYCHROMASIA BLD QL SMEAR: SLIGHT — SIGNIFICANT CHANGE UP
POTASSIUM SERPL-MCNC: 4.5 MMOL/L — SIGNIFICANT CHANGE UP (ref 3.5–5.3)
POTASSIUM SERPL-SCNC: 4.5 MMOL/L — SIGNIFICANT CHANGE UP (ref 3.5–5.3)
RBC # BLD: 2.46 M/UL — LOW (ref 4.2–5.8)
RBC # BLD: 2.57 M/UL — LOW (ref 4.2–5.8)
RBC # FLD: 15.9 % — HIGH (ref 10.3–14.5)
RBC # FLD: 16.1 % — HIGH (ref 10.3–14.5)
RBC BLD AUTO: ABNORMAL
SARS-COV-2 RNA SPEC QL NAA+PROBE: SIGNIFICANT CHANGE UP
SODIUM SERPL-SCNC: 139 MMOL/L — SIGNIFICANT CHANGE UP (ref 135–145)
WBC # BLD: 18.65 K/UL — HIGH (ref 3.8–10.5)
WBC # BLD: 20.54 K/UL — HIGH (ref 3.8–10.5)
WBC # FLD AUTO: 18.65 K/UL — HIGH (ref 3.8–10.5)
WBC # FLD AUTO: 20.54 K/UL — HIGH (ref 3.8–10.5)

## 2021-03-28 PROCEDURE — 99233 SBSQ HOSP IP/OBS HIGH 50: CPT

## 2021-03-28 PROCEDURE — 99232 SBSQ HOSP IP/OBS MODERATE 35: CPT

## 2021-03-28 RX ORDER — DEXTROSE 50 % IN WATER 50 %
25 SYRINGE (ML) INTRAVENOUS ONCE
Refills: 0 | Status: DISCONTINUED | OUTPATIENT
Start: 2021-03-28 | End: 2021-04-08

## 2021-03-28 RX ORDER — DEXTROSE 50 % IN WATER 50 %
12.5 SYRINGE (ML) INTRAVENOUS ONCE
Refills: 0 | Status: DISCONTINUED | OUTPATIENT
Start: 2021-03-28 | End: 2021-04-08

## 2021-03-28 RX ORDER — INSULIN LISPRO 100/ML
VIAL (ML) SUBCUTANEOUS
Refills: 0 | Status: DISCONTINUED | OUTPATIENT
Start: 2021-03-28 | End: 2021-04-08

## 2021-03-28 RX ORDER — ERYTHROPOIETIN 10000 [IU]/ML
10000 INJECTION, SOLUTION INTRAVENOUS; SUBCUTANEOUS
Refills: 0 | Status: DISCONTINUED | OUTPATIENT
Start: 2021-03-28 | End: 2021-04-05

## 2021-03-28 RX ORDER — SODIUM CHLORIDE 9 MG/ML
1000 INJECTION, SOLUTION INTRAVENOUS
Refills: 0 | Status: DISCONTINUED | OUTPATIENT
Start: 2021-03-28 | End: 2021-04-08

## 2021-03-28 RX ORDER — SENNA PLUS 8.6 MG/1
2 TABLET ORAL AT BEDTIME
Refills: 0 | Status: DISCONTINUED | OUTPATIENT
Start: 2021-03-28 | End: 2021-04-08

## 2021-03-28 RX ORDER — DEXTROSE 50 % IN WATER 50 %
15 SYRINGE (ML) INTRAVENOUS ONCE
Refills: 0 | Status: DISCONTINUED | OUTPATIENT
Start: 2021-03-28 | End: 2021-04-08

## 2021-03-28 RX ORDER — HEPARIN SODIUM 5000 [USP'U]/ML
INJECTION INTRAVENOUS; SUBCUTANEOUS
Qty: 25000 | Refills: 0 | Status: DISCONTINUED | OUTPATIENT
Start: 2021-03-28 | End: 2021-03-29

## 2021-03-28 RX ORDER — GLUCAGON INJECTION, SOLUTION 0.5 MG/.1ML
1 INJECTION, SOLUTION SUBCUTANEOUS ONCE
Refills: 0 | Status: DISCONTINUED | OUTPATIENT
Start: 2021-03-28 | End: 2021-04-08

## 2021-03-28 RX ORDER — POLYETHYLENE GLYCOL 3350 17 G/17G
17 POWDER, FOR SOLUTION ORAL DAILY
Refills: 0 | Status: DISCONTINUED | OUTPATIENT
Start: 2021-03-28 | End: 2021-04-08

## 2021-03-28 RX ADMIN — Medication 650 MILLIGRAM(S): at 11:59

## 2021-03-28 RX ADMIN — HEPARIN SODIUM 1200 UNIT(S)/HR: 5000 INJECTION INTRAVENOUS; SUBCUTANEOUS at 12:35

## 2021-03-28 RX ADMIN — Medication 650 MILLIGRAM(S): at 11:15

## 2021-03-28 RX ADMIN — Medication 6: at 17:13

## 2021-03-28 RX ADMIN — OXYCODONE HYDROCHLORIDE 2.5 MILLIGRAM(S): 5 TABLET ORAL at 10:14

## 2021-03-28 RX ADMIN — Medication 650 MILLIGRAM(S): at 17:13

## 2021-03-28 RX ADMIN — OXYCODONE HYDROCHLORIDE 2.5 MILLIGRAM(S): 5 TABLET ORAL at 16:20

## 2021-03-28 RX ADMIN — PANTOPRAZOLE SODIUM 40 MILLIGRAM(S): 20 TABLET, DELAYED RELEASE ORAL at 17:13

## 2021-03-28 RX ADMIN — Medication 650 MILLIGRAM(S): at 00:39

## 2021-03-28 RX ADMIN — Medication 4: at 06:05

## 2021-03-28 RX ADMIN — SENNA PLUS 2 TABLET(S): 8.6 TABLET ORAL at 21:39

## 2021-03-28 RX ADMIN — OXYCODONE HYDROCHLORIDE 2.5 MILLIGRAM(S): 5 TABLET ORAL at 15:50

## 2021-03-28 RX ADMIN — Medication 4: at 01:52

## 2021-03-28 RX ADMIN — Medication 650 MILLIGRAM(S): at 23:45

## 2021-03-28 RX ADMIN — POLYETHYLENE GLYCOL 3350 17 GRAM(S): 17 POWDER, FOR SOLUTION ORAL at 11:15

## 2021-03-28 RX ADMIN — HEPARIN SODIUM 1200 UNIT(S)/HR: 5000 INJECTION INTRAVENOUS; SUBCUTANEOUS at 06:23

## 2021-03-28 RX ADMIN — OXYCODONE HYDROCHLORIDE 2.5 MILLIGRAM(S): 5 TABLET ORAL at 11:14

## 2021-03-28 RX ADMIN — CLOPIDOGREL BISULFATE 75 MILLIGRAM(S): 75 TABLET, FILM COATED ORAL at 11:15

## 2021-03-28 RX ADMIN — HEPARIN SODIUM 1200 UNIT(S)/HR: 5000 INJECTION INTRAVENOUS; SUBCUTANEOUS at 02:02

## 2021-03-28 RX ADMIN — PANTOPRAZOLE SODIUM 40 MILLIGRAM(S): 20 TABLET, DELAYED RELEASE ORAL at 06:05

## 2021-03-28 NOTE — PHYSICAL THERAPY INITIAL EVALUATION ADULT - DISCHARGE DISPOSITION, PT EVAL
home with PT/assist, pending pt progress
[facility]/rehabilitation facility
home pending pt progress
home pending pt progress

## 2021-03-28 NOTE — PHYSICAL THERAPY INITIAL EVALUATION ADULT - REHAB POTENTIAL, PT EVAL
good, to achieve stated therapy goals
fair, will monitor progress closely
good, to achieve stated therapy goals
good, to achieve stated therapy goals

## 2021-03-28 NOTE — PHYSICAL THERAPY INITIAL EVALUATION ADULT - IMPAIRED TRANSFERS: SIT/STAND, REHAB EVAL
decreased strength
impaired balance/decreased strength
impaired balance/decreased flexibility/pain/impaired postural control/decreased ROM/decreased strength
impaired balance/decreased strength

## 2021-03-28 NOTE — PHYSICAL THERAPY INITIAL EVALUATION ADULT - DIAGNOSIS, PT EVAL
Decreased functional mobility
Decreased functional mobility
decreased strength and endurance; difficulty with functional mobility
Decreased functional mobility

## 2021-03-28 NOTE — PHYSICAL THERAPY INITIAL EVALUATION ADULT - FOLLOWS COMMANDS/ANSWERS QUESTIONS, REHAB EVAL
100% of the time
100% of the time
75% of the time/able to follow single-step instructions
100% of the time

## 2021-03-28 NOTE — PHYSICAL THERAPY INITIAL EVALUATION ADULT - IMPAIRED TRANSFERS: BED/CHAIR, REHAB EVAL
impaired balance/decreased flexibility/pain/impaired postural control/decreased ROM/decreased strength

## 2021-03-28 NOTE — PHYSICAL THERAPY INITIAL EVALUATION ADULT - ACTIVE RANGE OF MOTION EXAMINATION, REHAB EVAL
difficulty with left knee extension; unable to extend fully/bilateral upper extremity Active ROM was WFL (within functional limits)/bilateral  lower extremity Active ROM was WFL (within functional limits)/deficits as listed below

## 2021-03-28 NOTE — PROGRESS NOTE ADULT - ASSESSMENT
84 year old male with LLE critical limb ischemia s/p L CFA to below knee pop bypass with PTFE graft 3/20 which required revision with fem AT bypass with cryovein POD 3        -still supratherapeutic on hep drip; continue to decrease rate  -neurovascualr checks  -continue dysphagia diet as recommended by speech/swallow  OOB to chair  -continue plavix  -HD as per renal

## 2021-03-28 NOTE — PHYSICAL THERAPY INITIAL EVALUATION ADULT - ORIENTATION, REHAB EVAL
oriented to person, place, time and situation
oriented to person, place, time and situation
person/place
oriented to person, place, time and situation

## 2021-03-28 NOTE — PHYSICAL THERAPY INITIAL EVALUATION ADULT - MANUAL MUSCLE TESTING RESULTS, REHAB EVAL
generalized weakness throughout
generalized weakness throughout
Decreased functional mobility
grossly 3-/5, with the exception of left knee 2-/5

## 2021-03-28 NOTE — PHYSICAL THERAPY INITIAL EVALUATION ADULT - TRANSFER SAFETY CONCERNS NOTED: SIT/STAND, REHAB EVAL
decreased weight-shifting ability
decreased weight-shifting ability
decreased safety awareness/losing balance/decreased weight-shifting ability
decreased weight-shifting ability

## 2021-03-28 NOTE — PHYSICAL THERAPY INITIAL EVALUATION ADULT - BALANCE DISTURBANCE, IDENTIFIED IMPAIRMENT CONTRIBUTE, REHAB EVAL
decreased strength
decreased strength
pain/decreased strength
pain/impaired postural control/decreased ROM/decreased strength

## 2021-03-28 NOTE — PHYSICAL THERAPY INITIAL EVALUATION ADULT - LEVEL OF INDEPENDENCE: SIT/STAND, REHAB EVAL
maximum assist (25% patients effort)
Pt deferred, reports having recently returning from a procedure, is tired and wants to return to bed.
supervision
minimum assist (75% patients effort)

## 2021-03-28 NOTE — PHYSICAL THERAPY INITIAL EVALUATION ADULT - IMPAIRMENTS CONTRIBUTING IMPAIRED BED MOBILITY, REHAB EVAL
Health Maintenance Due   Topic Date Due   • Shingles Vaccine (1 of 2) 07/04/2013   • Hepatitis C Screening  07/04/2014       Patient is due for topics as listed above but is not proceeding with Immunization(s) Shingles and Hepatitis C Screening at this time.             
impaired balance/decreased strength
impaired balance/decreased flexibility/pain/decreased ROM/decreased strength

## 2021-03-28 NOTE — PHYSICAL THERAPY INITIAL EVALUATION ADULT - CRITERIA FOR SKILLED THERAPEUTIC INTERVENTIONS
impairments found
impairments found
impairments found/functional limitations in following categories
impairments found

## 2021-03-28 NOTE — PROGRESS NOTE ADULT - SUBJECTIVE AND OBJECTIVE BOX
24 HOUR EVENTS: Patient intermittently confused, intermittently c/o of nonspecific abdominal pain. Vitals remains stable. Tolerating diet however poor po intake.  Supratherapeutic on heparin gtt. Held heparin and decreased dose.  Still with tense LLE however, soft. CPK's remain low.     SUBJECTIVE/ROS:  [x ] A ten-point review of systems was otherwise negative except as noted.  [ ] Due to altered mental status/intubation, subjective information were not able to be obtained from the patient. History was obtained, to the extent possible, from review of the chart and collateral sources of information.      NEURO  RASS:  0   GCS:  14   CAM ICU: positive   Exam: Non focal neuro intact, confused   Meds: acetaminophen   Tablet .. 650 milliGRAM(s) Oral every 6 hours  oxyCODONE    IR 2.5 milliGRAM(s) Oral every 3 hours PRN Moderate Pain (4 - 6)    [x] Adequacy of sedation and pain control has been assessed and adjusted      RESPIRATORY  RR: 26 (03-28-21 @ 02:00) (13 - 27)  SpO2: 99% (03-28-21 @ 02:00) (90% - 100%)  Wt(kg): --  Exam: unlabored, clear to auscultation bilaterally  Mechanical Ventilation: Mode: CPAP with PS, RR (patient): 16, TV (machine): 380, FiO2: 30, PEEP: 5, PS: 5, MAP: 6  ABG - ( 27 Mar 2021 08:24 )  pH: 7.42  /  pCO2: 41    /  pO2: 112   / HCO3: 26    / Base Excess: 2.0   /  SaO2: 99      Lactate: x                [ ] Extubation Readiness Assessed  Meds:       CARDIOVASCULAR  HR: 61 (03-28-21 @ 02:00) (56 - 81)  BP: 146/39 (03-28-21 @ 02:00) (125/44 - 164/41)  BP(mean): 69 (03-28-21 @ 02:00) (64 - 82)  ABP: 181/46 (03-27-21 @ 11:00) (138/37 - 181/53)  ABP(mean): 76 (03-27-21 @ 11:00) (64 - 96)  Wt(kg): --  CVP(cm H2O): --      Exam: paced   Cardiac Rhythm: paced  Perfusion     [x ]Adequate   [ ]Inadequate  Mentation   [x ]Normal       [ ]Reduced  Extremities  [x ]Warm         [ ]Cool  Volume Status [ ]Hypervolemic [ x]Euvolemic [ ]Hypovolemic  Meds:       GI/NUTRITION  Exam: soft, nttp, nd  Diet: dysphagia diet   Meds: pantoprazole  Injectable 40 milliGRAM(s) IV Push every 12 hours      GENITOURINARY  I&O's Detail    03-26 @ 07:01 - 03-27 @ 07:00  --------------------------------------------------------  IN:    Dexmedetomidine: 129.5 mL    Heparin Infusion: 387 mL    Other (mL): 1000 mL    sodium chloride 0.9%: 1200 mL  Total IN: 2716.5 mL    OUT:    Indwelling Catheter - Urethral (mL): 15 mL    Other (mL): 1000 mL    Phenylephrine: 0 mL  Total OUT: 1015 mL    Total NET: 1701.5 mL      03-27 @ 07:01 - 03-28 @ 04:17  --------------------------------------------------------  IN:    Heparin Infusion: 209 mL    Heparin Infusion: 12 mL    sodium chloride 0.9%: 750 mL  Total IN: 971 mL    OUT:    Dexmedetomidine: 0 mL    Phenylephrine: 0 mL  Total OUT: 0 mL    Total NET: 971 mL          03-27    139  |  100  |  35.0<H>  ----------------------------<  141<H>  4.2   |  25.0  |  3.66<H>    Ca    7.4<L>      27 Mar 2021 08:11  Phos  4.9     03-27  Mg     2.1     03-27    TPro  3.2<L>  /  Alb  1.3<L>  /  TBili  0.3<L>  /  DBili  x   /  AST  39  /  ALT  6   /  AlkPhos  62  03-27    [ ] Camarena catheter, indication: N/A  Meds: dextrose 5%. 1000 milliLiter(s) IV Continuous <Continuous>  sodium chloride 0.9%. 1000 milliLiter(s) IV Continuous <Continuous>    Ext: Dopplerable pulses    Skin: Dressing c/d/i       HEMATOLOGIC  Meds: clopidogrel Tablet 75 milliGRAM(s) Oral daily  heparin  Infusion.  Unit(s)/Hr IV Continuous <Continuous>    [x] VTE Prophylaxis                        8.0    18.10 )-----------( 177      ( 27 Mar 2021 11:34 )             23.2     PTT - ( 28 Mar 2021 01:01 )  PTT:93.8 sec  Transfusion     [ ] PRBC   [ ] Platelets   [ ] FFP   [ ] Cryoprecipitate      INFECTIOUS DISEASES  T(C): 37.5 (03-28-21 @ 00:00), Max: 37.5 (03-28-21 @ 00:00)  Wt(kg): --  WBC Count: 18.10 K/uL (03-27 @ 11:34)  WBC Count: 15.87 K/uL (03-27 @ 08:11)  WBC Count: 14.67 K/uL (03-27 @ 06:11)    Recent Cultures:  Specimen Source: .Blood Blood, 03-22 @ 22:01; Results   No growth at 5 days.; Gram Stain: --; Organism: --    Meds:       ENDOCRINE  Capillary Blood Glucose    Meds: dextrose 50% Injectable 25 Gram(s) IV Push once  dextrose 50% Injectable 12.5 Gram(s) IV Push once  dextrose 50% Injectable 25 Gram(s) IV Push once  glucagon  Injectable 1 milliGRAM(s) IntraMuscular once  insulin lispro (ADMELOG) corrective regimen sliding scale   SubCutaneous every 4 hours        ACCESS DEVICES:  [ ] Peripheral IV  [ ] Central Venous Line	[ ] R	[ ] L	[ ] IJ	[ ] Fem	[ ] SC	Placed:   [ ] Arterial Line		[ ] R	[ ] L	[ ] Fem	[ ] Rad	[ ] Ax	Placed:   [ ] PICC:					[ ] Mediport  [ ] Urinary Catheter, Date Placed:   [ ] Necessity of urinary, arterial, and venous catheters discussed    OTHER MEDICATIONS:      CODE STATUS:     IMAGING:

## 2021-03-28 NOTE — PHYSICAL THERAPY INITIAL EVALUATION ADULT - PLANNED THERAPY INTERVENTIONS, PT EVAL
stair training/balance training/bed mobility training/gait training/ROM/strengthening/transfer training
balance training/bed mobility training/gait training/ROM/strengthening/stretching/transfer training
balance training/bed mobility training/ROM/strengthening/stretching/transfer training
balance training/bed mobility training/gait training/ROM/strengthening/stretching/transfer training

## 2021-03-28 NOTE — PROGRESS NOTE ADULT - SUBJECTIVE AND OBJECTIVE BOX
no acute events overnight. extubated yesterday successful to nasal cannula. tolerating diet. on heparin drip supratherapeutic. complains of pain everywhere but is distractable. exhibiting confusion, but is not agitated                MEDICATIONS  (STANDING):  acetaminophen   Tablet .. 650 milliGRAM(s) Oral every 6 hours  clopidogrel Tablet 75 milliGRAM(s) Oral daily  dextrose 5%. 1000 milliLiter(s) (100 mL/Hr) IV Continuous <Continuous>  dextrose 50% Injectable 25 Gram(s) IV Push once  dextrose 50% Injectable 12.5 Gram(s) IV Push once  dextrose 50% Injectable 25 Gram(s) IV Push once  glucagon  Injectable 1 milliGRAM(s) IntraMuscular once  heparin  Infusion.  Unit(s)/Hr (12 mL/Hr) IV Continuous <Continuous>  insulin lispro (ADMELOG) corrective regimen sliding scale   SubCutaneous every 4 hours  pantoprazole  Injectable 40 milliGRAM(s) IV Push every 12 hours  sodium chloride 0.9%. 1000 milliLiter(s) (50 mL/Hr) IV Continuous <Continuous>    MEDICATIONS  (PRN):  oxyCODONE    IR 2.5 milliGRAM(s) Oral every 3 hours PRN Moderate Pain (4 - 6)      Vital Signs Last 24 Hrs  T(C): 37.5 (28 Mar 2021 00:00), Max: 37.5 (28 Mar 2021 00:00)  T(F): 99.5 (28 Mar 2021 00:00), Max: 99.5 (28 Mar 2021 00:00)  HR: 61 (28 Mar 2021 02:00) (56 - 81)  BP: 146/39 (28 Mar 2021 02:00) (111/55 - 164/41)  BP(mean): 69 (28 Mar 2021 02:00) (64 - 82)  RR: 26 (28 Mar 2021 02:00) (13 - 27)  SpO2: 99% (28 Mar 2021 02:00) (90% - 100%)    Physical Exam:    general: no acute distress    Respiratory: Breath Sounds equal & clear to auscultation, no accessory muscle use    Cardiovascular: Regular rate & rhythm, normal S1, S2; no murmurs, gallops or rubs    Gastrointestinal: Soft, non-tender, areas of ecchymosis noted along left flank    Extremities: left lower extremity with     Vascular: Equal and normal pulses: 2+ peripheral pulses throughout    Musculoskeletal: No joint pain, swelling or deformity; no limitation of movement    Skin: No rashes      I&O's Detail    26 Mar 2021 07:01  -  27 Mar 2021 07:00  --------------------------------------------------------  IN:    Dexmedetomidine: 129.5 mL    Heparin Infusion: 387 mL    Other (mL): 1000 mL    sodium chloride 0.9%: 1200 mL  Total IN: 2716.5 mL    OUT:    Indwelling Catheter - Urethral (mL): 15 mL    Other (mL): 1000 mL    Phenylephrine: 0 mL  Total OUT: 1015 mL    Total NET: 1701.5 mL      27 Mar 2021 07:01  -  28 Mar 2021 02:45  --------------------------------------------------------  IN:    Heparin Infusion: 209 mL    Heparin Infusion: 12 mL    sodium chloride 0.9%: 750 mL  Total IN: 971 mL    OUT:    Dexmedetomidine: 0 mL    Phenylephrine: 0 mL  Total OUT: 0 mL    Total NET: 971 mL          LABS:                        8.0    18.10 )-----------( 177      ( 27 Mar 2021 11:34 )             23.2     03-27    139  |  100  |  35.0<H>  ----------------------------<  141<H>  4.2   |  25.0  |  3.66<H>    Ca    7.4<L>      27 Mar 2021 08:11  Phos  4.9     03-27  Mg     2.1     03-27    TPro  3.2<L>  /  Alb  1.3<L>  /  TBili  0.3<L>  /  DBili  x   /  AST  39  /  ALT  6   /  AlkPhos  62  03-27    PTT - ( 28 Mar 2021 01:01 )  PTT:93.8 sec      RADIOLOGY & ADDITIONAL STUDIES: no acute events overnight. extubated yesterday successful to nasal cannula. tolerating diet. on heparin drip supratherapeutic. complains of pain everywhere but is distractable. exhibiting confusion, but is not agitated                MEDICATIONS  (STANDING):  acetaminophen   Tablet .. 650 milliGRAM(s) Oral every 6 hours  clopidogrel Tablet 75 milliGRAM(s) Oral daily  dextrose 5%. 1000 milliLiter(s) (100 mL/Hr) IV Continuous <Continuous>  dextrose 50% Injectable 25 Gram(s) IV Push once  dextrose 50% Injectable 12.5 Gram(s) IV Push once  dextrose 50% Injectable 25 Gram(s) IV Push once  glucagon  Injectable 1 milliGRAM(s) IntraMuscular once  heparin  Infusion.  Unit(s)/Hr (12 mL/Hr) IV Continuous <Continuous>  insulin lispro (ADMELOG) corrective regimen sliding scale   SubCutaneous every 4 hours  pantoprazole  Injectable 40 milliGRAM(s) IV Push every 12 hours  sodium chloride 0.9%. 1000 milliLiter(s) (50 mL/Hr) IV Continuous <Continuous>    MEDICATIONS  (PRN):  oxyCODONE    IR 2.5 milliGRAM(s) Oral every 3 hours PRN Moderate Pain (4 - 6)      Vital Signs Last 24 Hrs  T(C): 37.5 (28 Mar 2021 00:00), Max: 37.5 (28 Mar 2021 00:00)  T(F): 99.5 (28 Mar 2021 00:00), Max: 99.5 (28 Mar 2021 00:00)  HR: 61 (28 Mar 2021 02:00) (56 - 81)  BP: 146/39 (28 Mar 2021 02:00) (111/55 - 164/41)  BP(mean): 69 (28 Mar 2021 02:00) (64 - 82)  RR: 26 (28 Mar 2021 02:00) (13 - 27)  SpO2: 99% (28 Mar 2021 02:00) (90% - 100%)    Physical Exam:    general: no acute distress    Respiratory: Breath Sounds equal & clear to auscultation, no accessory muscle use    Cardiovascular: Regular rate & rhythm, normal S1, S2; no murmurs, gallops or rubs    Gastrointestinal: Soft, non-tender, areas of ecchymosis noted along left flank    Extremities: left lower extremity with pitting edema. discolored left 4th toe along plantar aspect, soft compartments    Vascular: biphasic signal L DP. palpable bypass pulse along left lateral thigh coursing over midshin.           I&O's Detail    26 Mar 2021 07:01  -  27 Mar 2021 07:00  --------------------------------------------------------  IN:    Dexmedetomidine: 129.5 mL    Heparin Infusion: 387 mL    Other (mL): 1000 mL    sodium chloride 0.9%: 1200 mL  Total IN: 2716.5 mL    OUT:    Indwelling Catheter - Urethral (mL): 15 mL    Other (mL): 1000 mL    Phenylephrine: 0 mL  Total OUT: 1015 mL    Total NET: 1701.5 mL      27 Mar 2021 07:01  -  28 Mar 2021 02:45  --------------------------------------------------------  IN:    Heparin Infusion: 209 mL    Heparin Infusion: 12 mL    sodium chloride 0.9%: 750 mL  Total IN: 971 mL    OUT:    Dexmedetomidine: 0 mL    Phenylephrine: 0 mL  Total OUT: 0 mL    Total NET: 971 mL          LABS:                        8.0    18.10 )-----------( 177      ( 27 Mar 2021 11:34 )             23.2     03-27    139  |  100  |  35.0<H>  ----------------------------<  141<H>  4.2   |  25.0  |  3.66<H>    Ca    7.4<L>      27 Mar 2021 08:11  Phos  4.9     03-27  Mg     2.1     03-27    TPro  3.2<L>  /  Alb  1.3<L>  /  TBili  0.3<L>  /  DBili  x   /  AST  39  /  ALT  6   /  AlkPhos  62  03-27    PTT - ( 28 Mar 2021 01:01 )  PTT:93.8 sec      RADIOLOGY & ADDITIONAL STUDIES:

## 2021-03-28 NOTE — PHYSICAL THERAPY INITIAL EVALUATION ADULT - IMPAIRMENTS FOUND, PT EVAL
aerobic capacity/endurance/gait, locomotion, and balance/muscle strength/poor safety awareness/posture/ROM
aerobic capacity/endurance/gait, locomotion, and balance/muscle strength
aerobic capacity/endurance/gait, locomotion, and balance/muscle strength
aerobic capacity/endurance/gait, locomotion, and balance

## 2021-03-28 NOTE — PROGRESS NOTE ADULT - ASSESSMENT
A/P 85 yo male with the PMH of HTN, HLD, CAD, HFpEF, s/p Right CEA for Carotid artery disease, ESRD on HD 2d/week (M/Fri) via LUE fistula, s/p flecainide w/ ILR placed 6/2020, AS s/p TAVR, PAD with RLE revasc on 10/2020 on Plavix, anemia with thrombosed L CFA to below knee pop bypass s/p  LLE bypass revision w explant of PTFE bypass , CFA to AT bypass w cryovein complicated by acute blood loss anemia and loss of PPM function intra-op. Admitted to the SICU for post-operative management        Neuro: Continue adequate analgesia, delirium precautions    Card: Hemodynamic monitoring    Pulm: Aggressive pulmonary toileting, IS    GI: F/u swallow recommendations, dysphagia diet, bowel regimen     Renal failure - plan for dialysis Monday     Endo: ISS    Hem/DVT: DVT, heparin gtt- repeat PTT pending 60-90    Ext: Dopplerable signals, continue neurovascular checks    Dispo: SICU

## 2021-03-28 NOTE — PROGRESS NOTE ADULT - SUBJECTIVE AND OBJECTIVE BOX
INTERVAL HPI/OVERNIGHT EVENTS/SUBJECTIVE:  Received patient as downgrade from SICU earlier today. Pt seen and examined. Worked with PT today. tolerating diet. No complaints.     ICU Vital Signs Last 24 Hrs  T(C): 37.2 (28 Mar 2021 15:37), Max: 37.5 (28 Mar 2021 00:00)  T(F): 98.9 (28 Mar 2021 15:37), Max: 99.5 (28 Mar 2021 00:00)  HR: 73 (28 Mar 2021 15:37) (56 - 73)  BP: 181/65 (28 Mar 2021 15:37) (139/59 - 181/65)  BP(mean): 71 (28 Mar 2021 12:00) (64 - 134)  ABP: --  ABP(mean): --  RR: 18 (28 Mar 2021 15:37) (15 - 26)  SpO2: 98% (28 Mar 2021 15:37) (90% - 100%)      I&O's Detail    27 Mar 2021 07:01  -  28 Mar 2021 07:00  --------------------------------------------------------  IN:    Heparin Infusion: 209 mL    Heparin Infusion: 72 mL    sodium chloride 0.9%: 1000 mL  Total IN: 1281 mL    OUT:    Dexmedetomidine: 0 mL    Phenylephrine: 0 mL  Total OUT: 0 mL    Total NET: 1281 mL      28 Mar 2021 07:01  -  28 Mar 2021 17:17  --------------------------------------------------------  IN:    Heparin Infusion: 48 mL    Oral Fluid: 200 mL    sodium chloride 0.9%: 250 mL  Total IN: 498 mL    OUT:    Intermittent Catheterization - Urethral (mL): 1 mL  Total OUT: 1 mL    Total NET: 497 mL    ABG - ( 27 Mar 2021 08:24 )  pH, Arterial: 7.42  pH, Blood: x     /  pCO2: 41    /  pO2: 112   / HCO3: 26    / Base Excess: 2.0   /  SaO2: 99        MEDICATIONS  (STANDING):  acetaminophen   Tablet .. 650 milliGRAM(s) Oral every 6 hours  clopidogrel Tablet 75 milliGRAM(s) Oral daily  dextrose 40% Gel 15 Gram(s) Oral once  dextrose 5%. 1000 milliLiter(s) (50 mL/Hr) IV Continuous <Continuous>  dextrose 5%. 1000 milliLiter(s) (100 mL/Hr) IV Continuous <Continuous>  dextrose 50% Injectable 25 Gram(s) IV Push once  dextrose 50% Injectable 12.5 Gram(s) IV Push once  dextrose 50% Injectable 25 Gram(s) IV Push once  epoetin juan-epbx (RETACRIT) Injectable 58732 Unit(s) IV Push <User Schedule>  glucagon  Injectable 1 milliGRAM(s) IntraMuscular once  heparin  Infusion.  Unit(s)/Hr (12 mL/Hr) IV Continuous <Continuous>  insulin lispro (ADMELOG) corrective regimen sliding scale   SubCutaneous three times a day before meals  pantoprazole  Injectable 40 milliGRAM(s) IV Push every 12 hours  polyethylene glycol 3350 17 Gram(s) Oral daily  senna 2 Tablet(s) Oral at bedtime  sodium chloride 0.9%. 1000 milliLiter(s) (50 mL/Hr) IV Continuous <Continuous>    MEDICATIONS  (PRN):  oxyCODONE    IR 2.5 milliGRAM(s) Oral every 3 hours PRN Moderate Pain (4 - 6)    MISC:     PHYSICAL EXAM:    general: no acute distress    Respiratory: Breath Sounds equal & clear to auscultation, no accessory muscle use    Cardiovascular: Regular rate & rhythm, normal S1, S2; no murmurs, gallops or rubs    Gastrointestinal: Soft, non-tender, areas of ecchymosis noted along left flank    Extremities: left lower extremity with pitting edema. discolored left 4th toe along plantar aspect, soft compartments  Vascular: biphasic signal L DP. palpable bypass pulse along left lateral thigh coursing over midshin.   dressings removed, staples to medial calf incision c//i, sutures to lateral incision and thigh incision well approximated c/d/i. stapled to L groin c/d/i . no neuro/sensory deficits    LABS:  CBC Full  -  ( 28 Mar 2021 11:56 )  WBC Count : 18.65 K/uL  RBC Count : 2.57 M/uL  Hemoglobin : 7.8 g/dL  Hematocrit : 23.3 %  Platelet Count - Automated : 204 K/uL  Mean Cell Volume : 90.7 fl  Mean Cell Hemoglobin : 30.4 pg  Mean Cell Hemoglobin Concentration : 33.5 gm/dL  Auto Neutrophil # : x  Auto Lymphocyte # : x  Auto Monocyte # : x  Auto Eosinophil # : x  Auto Basophil # : x  Auto Neutrophil % : x  Auto Lymphocyte % : x  Auto Monocyte % : x  Auto Eosinophil % : x  Auto Basophil % : x    03-28    139  |  101  |  47.0<H>  ----------------------------<  199<H>  4.5   |  24.0  |  4.48<H>    Ca    7.6<L>      28 Mar 2021 05:00  Phos  4.5     03-28  Mg     2.1     03-28    TPro  3.2<L>  /  Alb  1.3<L>  /  TBili  0.3<L>  /  DBili  x   /  AST  39  /  ALT  6   /  AlkPhos  62  03-27    PTT - ( 28 Mar 2021 11:56 )  PTT:67.5 sec    RECENT CULTURES:  03-22 .Blood Blood XXXX XXXX   No growth at 5 days.        LIVER FUNCTIONS - ( 27 Mar 2021 06:11 )  Alb: 1.3 g/dL / Pro: 3.2 g/dL / ALK PHOS: 62 U/L / ALT: 6 U/L / AST: 39 U/L / GGT: x           CARDIAC MARKERS ( 28 Mar 2021 05:00 )  x     / x     / 1004 U/L / x     / 8.5 ng/mL  CARDIAC MARKERS ( 27 Mar 2021 08:11 )  x     / x     / 933 U/L / x     / 10.2 ng/mL  CARDIAC MARKERS ( 27 Mar 2021 06:11 )  x     / x     / 803 U/L / x     / 9.1 ng/mL  CARDIAC MARKERS ( 26 Mar 2021 19:10 )  x     / x     / 1146 U/L / x     / 17.2 ng/mL    ASSESSMENT/PLAN:  86yMale presenting with:  LLE critical limb ischemia s/p L CFA to below knee pop bypass with PTFE graft 3/20 which required revision with fem AT bypass with cryovein POD 3  -therapeutic on hep gtt  -neurovascualr checks  -continue dysphagia diet as recommended by speech/swallow  OOB to chair  -continue plavix  -HD as per renal  - monitor leukocytosis, f/u am labs

## 2021-03-28 NOTE — PHYSICAL THERAPY INITIAL EVALUATION ADULT - LEVEL OF CONSCIOUSNESS, REHAB EVAL
alert
alert
pt reports the pain medication has made him confused at times/alert/confused
alert/confused

## 2021-03-28 NOTE — PHYSICAL THERAPY INITIAL EVALUATION ADULT - GENERAL OBSERVATIONS, REHAB EVAL
Pt rec'd upright in the chair at bedside breathing RA in NAD
Pt. received supine in bed, +IV, +tele, +2L O2 NC
Pt rec'd upright in the bed, left LE elevated on pillow, bandage to surgical site
Pt rec'd upright with the HOB elevated. Pt breathing 02 via NC.

## 2021-03-28 NOTE — PROGRESS NOTE ADULT - ASSESSMENT
1) ESRD on HD  2) MBD of renal dx  3) Anemia of renal dx  4) Vol HTN    Tolerated HD Friday; without ultrafiltration  HD ordered for Monday;    Will place on retacrit 10k units TIW; renal anemia  Trend phosphorus; hold binder while in ICU given poor diet;    jake SICU team this AM on rounds

## 2021-03-28 NOTE — PHYSICAL THERAPY INITIAL EVALUATION ADULT - PATIENT/FAMILY/SIGNIFICANT OTHER GOALS STATEMENT, PT EVAL
to get better
Pt would like to be without pain and be able to walk
Pt would like to return home and be without pain
Pt would like to return home

## 2021-03-28 NOTE — PROGRESS NOTE ADULT - SUBJECTIVE AND OBJECTIVE BOX
[facility] DIVISION OF KIDNEY DISEASES AND HYPERTENSION -- FOLLOW UP NOTE  --------------------------------------------------------------------------------  Chief Complaint:  ESRD HD  24 hour events/subjective:  Seen/examined this AM in ICU  NAD        PAST HISTORY  --------------------------------------------------------------------------------  No significant changes to PMH, PSH, FHx, SHx, unless otherwise noted    ALLERGIES & MEDICATIONS  --------------------------------------------------------------------------------  Allergies    Plavix (Hives)  Toprol-XL (Rash)    Intolerances      Standing Inpatient Medications  acetaminophen   Tablet .. 650 milliGRAM(s) Oral every 6 hours  clopidogrel Tablet 75 milliGRAM(s) Oral daily  dextrose 40% Gel 15 Gram(s) Oral once  dextrose 5%. 1000 milliLiter(s) IV Continuous <Continuous>  dextrose 5%. 1000 milliLiter(s) IV Continuous <Continuous>  dextrose 50% Injectable 25 Gram(s) IV Push once  dextrose 50% Injectable 12.5 Gram(s) IV Push once  dextrose 50% Injectable 25 Gram(s) IV Push once  glucagon  Injectable 1 milliGRAM(s) IntraMuscular once  heparin  Infusion.  Unit(s)/Hr IV Continuous <Continuous>  insulin lispro (ADMELOG) corrective regimen sliding scale   SubCutaneous three times a day before meals  pantoprazole  Injectable 40 milliGRAM(s) IV Push every 12 hours  polyethylene glycol 3350 17 Gram(s) Oral daily  senna 2 Tablet(s) Oral at bedtime  sodium chloride 0.9%. 1000 milliLiter(s) IV Continuous <Continuous>    PRN Inpatient Medications  oxyCODONE    IR 2.5 milliGRAM(s) Oral every 3 hours PRN      REVIEW OF SYSTEMS  --------------------------------------------------------------------------------  Unable to obtain    VITALS/PHYSICAL EXAM  --------------------------------------------------------------------------------  T(C): 37 (03-28-21 @ 08:00), Max: 37.5 (03-28-21 @ 00:00)  HR: 58 (03-28-21 @ 11:00) (56 - 81)  BP: 164/42 (03-28-21 @ 11:00) (125/54 - 164/42)  RR: 22 (03-28-21 @ 11:00) (15 - 27)  SpO2: 98% (03-28-21 @ 11:00) (90% - 100%)  Wt(kg): --        03-27-21 @ 07:01  -  03-28-21 @ 07:00  --------------------------------------------------------  IN: 1281 mL / OUT: 0 mL / NET: 1281 mL    03-28-21 @ 07:01  -  03-28-21 @ 11:44  --------------------------------------------------------  IN: 498 mL / OUT: 1 mL / NET: 497 mL      Physical Exam:  	Gen: NAD, on nasal cannula    	HEENT: venturi mask  	Pulm: dec BS  	CV: RRR, S1S2; no rub  	Back: No spinal or CVA tenderness; no sacral edema  	Abd: +BS, soft, nontender/nondistended  	: No suprapubic tenderness  	UE: Warm, FROM, no clubbing, intact strength; no edema; no asterixis  	LE: L heel dressing in place  	Neuro: No focal deficits, intact gait  	Psych: Normal affect and mood  	Skin: Warm, without rashes    LABS/STUDIES  --------------------------------------------------------------------------------              7.5    20.54 >-----------<  202      [03-28-21 @ 05:00]              22.4     139  |  101  |  47.0  ----------------------------<  199      [03-28-21 @ 05:00]  4.5   |  24.0  |  4.48        Ca     7.6     [03-28-21 @ 05:00]      Mg     2.1     [03-28-21 @ 05:00]      Phos  4.5     [03-28-21 @ 05:00]    TPro  3.2  /  Alb  1.3  /  TBili  0.3  /  DBili  x   /  AST  39  /  ALT  6   /  AlkPhos  62  [03-27-21 @ 06:11]      PTT: 71.4       [03-28-21 @ 05:55]    CK 1004      [03-28-21 @ 05:00]    Creatinine Trend:  SCr 4.48 [03-28 @ 05:00]  SCr 3.66 [03-27 @ 08:11]  SCr 2.62 [03-27 @ 06:11]  SCr 4.16 [03-26 @ 02:38]  SCr 3.80 [03-25 @ 20:29]    Urinalysis - [03-22-21 @ 22:41]      Color Yellow / Appearance Clear / SG 1.020 / pH 5.0      Gluc 50 / Ketone Trace  / Bili Small / Urobili Negative       Blood Moderate / Protein 100 / Leuk Est Trace / Nitrite Negative      RBC 0-2 / WBC 0-2 / Hyaline  / Gran  / Sq Epi  / Non Sq Epi Occasional / Bacteria       Iron 53, TIBC 266, %sat 20      [08-19-20 @ 06:32]  Ferritin 184      [08-19-20 @ 06:32]  PTH -- (Ca 9.6)      [08-19-20 @ 16:08]   91  Vitamin D (25OH) 26.4      [08-19-20 @ 16:08]  HbA1c 4.9      [08-09-18 @ 05:54]  TSH 2.16      [09-15-20 @ 02:01]

## 2021-03-28 NOTE — PHYSICAL THERAPY INITIAL EVALUATION ADULT - PERTINENT HX OF CURRENT PROBLEM, REHAB EVAL
Pt. is s/p LLE bypass revision with explant of PTFE bypass, CFA to AT bypass with cryovein on 3/25/21.
Pt presents to [facility] with reports of weakness, reports having collapsed at home, states his wife caught him. Pt has been c/o left LE pain that comes and goes.
Pt presents to [facility] with reports of weakness, reports having collapsed at home, states his wife caught him
Pt presents to [facility] with reports of weakness, reports having collapsed at home, states his wife caught him. Pt had been c/o left LE pain that comes and goes.

## 2021-03-29 LAB
ALBUMIN SERPL ELPH-MCNC: 1.8 G/DL — LOW (ref 3.3–5.2)
ALP SERPL-CCNC: 180 U/L — HIGH (ref 40–120)
ALT FLD-CCNC: 5 U/L — SIGNIFICANT CHANGE UP
ANION GAP SERPL CALC-SCNC: 19 MMOL/L — HIGH (ref 5–17)
APTT BLD: 78 SEC — HIGH (ref 27.5–35.5)
AST SERPL-CCNC: 44 U/L — HIGH
BASOPHILS # BLD AUTO: 0.04 K/UL — SIGNIFICANT CHANGE UP (ref 0–0.2)
BASOPHILS NFR BLD AUTO: 0.2 % — SIGNIFICANT CHANGE UP (ref 0–2)
BILIRUB SERPL-MCNC: 0.6 MG/DL — SIGNIFICANT CHANGE UP (ref 0.4–2)
BLD GP AB SCN SERPL QL: SIGNIFICANT CHANGE UP
BUN SERPL-MCNC: 57 MG/DL — HIGH (ref 8–20)
CALCIUM SERPL-MCNC: 7.9 MG/DL — LOW (ref 8.6–10.2)
CHLORIDE SERPL-SCNC: 98 MMOL/L — SIGNIFICANT CHANGE UP (ref 98–107)
CK MB CFR SERPL CALC: 5.8 NG/ML — SIGNIFICANT CHANGE UP (ref 0–6.7)
CK SERPL-CCNC: 684 U/L — HIGH (ref 30–200)
CO2 SERPL-SCNC: 20 MMOL/L — LOW (ref 22–29)
CREAT SERPL-MCNC: 5.39 MG/DL — HIGH (ref 0.5–1.3)
EOSINOPHIL # BLD AUTO: 0.05 K/UL — SIGNIFICANT CHANGE UP (ref 0–0.5)
EOSINOPHIL NFR BLD AUTO: 0.3 % — SIGNIFICANT CHANGE UP (ref 0–6)
GLUCOSE BLDC GLUCOMTR-MCNC: 109 MG/DL — HIGH (ref 70–99)
GLUCOSE BLDC GLUCOMTR-MCNC: 113 MG/DL — HIGH (ref 70–99)
GLUCOSE BLDC GLUCOMTR-MCNC: 144 MG/DL — HIGH (ref 70–99)
GLUCOSE BLDC GLUCOMTR-MCNC: 156 MG/DL — HIGH (ref 70–99)
GLUCOSE SERPL-MCNC: 139 MG/DL — HIGH (ref 70–99)
HCT VFR BLD CALC: 23.5 % — LOW (ref 39–50)
HGB BLD-MCNC: 7.7 G/DL — LOW (ref 13–17)
IMM GRANULOCYTES NFR BLD AUTO: 2.3 % — HIGH (ref 0–1.5)
LYMPHOCYTES # BLD AUTO: 0.43 K/UL — LOW (ref 1–3.3)
LYMPHOCYTES # BLD AUTO: 2.4 % — LOW (ref 13–44)
MAGNESIUM SERPL-MCNC: 2.2 MG/DL — SIGNIFICANT CHANGE UP (ref 1.6–2.6)
MCHC RBC-ENTMCNC: 30 PG — SIGNIFICANT CHANGE UP (ref 27–34)
MCHC RBC-ENTMCNC: 32.8 GM/DL — SIGNIFICANT CHANGE UP (ref 32–36)
MCV RBC AUTO: 91.4 FL — SIGNIFICANT CHANGE UP (ref 80–100)
MONOCYTES # BLD AUTO: 0.84 K/UL — SIGNIFICANT CHANGE UP (ref 0–0.9)
MONOCYTES NFR BLD AUTO: 4.8 % — SIGNIFICANT CHANGE UP (ref 2–14)
NEUTROPHILS # BLD AUTO: 15.88 K/UL — HIGH (ref 1.8–7.4)
NEUTROPHILS NFR BLD AUTO: 90 % — HIGH (ref 43–77)
PHOSPHATE SERPL-MCNC: 5.1 MG/DL — HIGH (ref 2.4–4.7)
PLATELET # BLD AUTO: 229 K/UL — SIGNIFICANT CHANGE UP (ref 150–400)
POTASSIUM SERPL-MCNC: 5 MMOL/L — SIGNIFICANT CHANGE UP (ref 3.5–5.3)
POTASSIUM SERPL-SCNC: 5 MMOL/L — SIGNIFICANT CHANGE UP (ref 3.5–5.3)
PROT SERPL-MCNC: 5.5 G/DL — LOW (ref 6.6–8.7)
RBC # BLD: 2.57 M/UL — LOW (ref 4.2–5.8)
RBC # FLD: 16.3 % — HIGH (ref 10.3–14.5)
SODIUM SERPL-SCNC: 137 MMOL/L — SIGNIFICANT CHANGE UP (ref 135–145)
WBC # BLD: 17.65 K/UL — HIGH (ref 3.8–10.5)
WBC # FLD AUTO: 17.65 K/UL — HIGH (ref 3.8–10.5)

## 2021-03-29 PROCEDURE — 93971 EXTREMITY STUDY: CPT | Mod: 26,RT

## 2021-03-29 PROCEDURE — 90937 HEMODIALYSIS REPEATED EVAL: CPT

## 2021-03-29 RX ORDER — APIXABAN 2.5 MG/1
2.5 TABLET, FILM COATED ORAL
Refills: 0 | Status: DISCONTINUED | OUTPATIENT
Start: 2021-03-29 | End: 2021-04-03

## 2021-03-29 RX ORDER — APIXABAN 2.5 MG/1
2.5 TABLET, FILM COATED ORAL ONCE
Refills: 0 | Status: COMPLETED | OUTPATIENT
Start: 2021-03-29 | End: 2021-03-29

## 2021-03-29 RX ORDER — APIXABAN 2.5 MG/1
1 TABLET, FILM COATED ORAL
Qty: 60 | Refills: 0
Start: 2021-03-29

## 2021-03-29 RX ADMIN — CLOPIDOGREL BISULFATE 75 MILLIGRAM(S): 75 TABLET, FILM COATED ORAL at 12:36

## 2021-03-29 RX ADMIN — Medication 650 MILLIGRAM(S): at 05:55

## 2021-03-29 RX ADMIN — POLYETHYLENE GLYCOL 3350 17 GRAM(S): 17 POWDER, FOR SOLUTION ORAL at 23:02

## 2021-03-29 RX ADMIN — HEPARIN SODIUM 1200 UNIT(S)/HR: 5000 INJECTION INTRAVENOUS; SUBCUTANEOUS at 07:19

## 2021-03-29 RX ADMIN — Medication 650 MILLIGRAM(S): at 12:36

## 2021-03-29 RX ADMIN — Medication 1 DROP(S): at 18:51

## 2021-03-29 RX ADMIN — ERYTHROPOIETIN 10000 UNIT(S): 10000 INJECTION, SOLUTION INTRAVENOUS; SUBCUTANEOUS at 13:52

## 2021-03-29 RX ADMIN — OXYCODONE HYDROCHLORIDE 2.5 MILLIGRAM(S): 5 TABLET ORAL at 13:20

## 2021-03-29 RX ADMIN — Medication 650 MILLIGRAM(S): at 23:00

## 2021-03-29 RX ADMIN — APIXABAN 2.5 MILLIGRAM(S): 2.5 TABLET, FILM COATED ORAL at 13:16

## 2021-03-29 RX ADMIN — OXYCODONE HYDROCHLORIDE 2.5 MILLIGRAM(S): 5 TABLET ORAL at 12:35

## 2021-03-29 RX ADMIN — Medication 650 MILLIGRAM(S): at 07:00

## 2021-03-29 RX ADMIN — Medication 3: at 08:22

## 2021-03-29 RX ADMIN — Medication 650 MILLIGRAM(S): at 13:20

## 2021-03-29 RX ADMIN — PANTOPRAZOLE SODIUM 40 MILLIGRAM(S): 20 TABLET, DELAYED RELEASE ORAL at 05:54

## 2021-03-29 RX ADMIN — SENNA PLUS 2 TABLET(S): 8.6 TABLET ORAL at 23:00

## 2021-03-29 RX ADMIN — APIXABAN 2.5 MILLIGRAM(S): 2.5 TABLET, FILM COATED ORAL at 23:00

## 2021-03-29 NOTE — PROGRESS NOTE ADULT - ASSESSMENT
84 year old male with LLE critical limb ischemia s/p L CFA to below knee pop bypass with PTFE graft 3/20 which required revision with fem AT bypass with cryovein 3/25.     - Heparin drip discontinued and Eliquis started for LLE DVT  - Neurovascular checks  - continue dysphagia diet as recommended by speech/swallow  - OOB to chair  - continue Plavix  - OT  - PT  -Home pending PT evaluation and placement    PRBC 2 Units - Transfusion during HD Today,

## 2021-03-29 NOTE — PROGRESS NOTE ADULT - ASSESSMENT
Completed HD tx. w/out complications.    AVF patent and functioning well.    Pt. remains confused but able to say name and .     Received   2 units of PRBC w/out reaction. V/S stable.     On cont. tele monitoring.    Denies SOB or chest pain.     D/W  primary nurse by phone.

## 2021-03-29 NOTE — PROGRESS NOTE ADULT - SUBJECTIVE AND OBJECTIVE BOX
[facility]                                   [address] [place] [address]                                                      PHONE: [phone]                                                         FAX: [phone]  [email or web address]  ---------------------------------------------------------------------------------------------------------------------------------    Overnight events/patient complaints: confused at times, edematous RUE, LLE adequate pulse postop, tele 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 650 milliGRAM(s) Oral every 6 hours  clopidogrel Tablet 75 milliGRAM(s) Oral daily  dextrose 40% Gel 15 Gram(s) Oral once  dextrose 5%. 1000 milliLiter(s) (100 mL/Hr) IV Continuous <Continuous>  dextrose 5%. 1000 milliLiter(s) (50 mL/Hr) IV Continuous <Continuous>  dextrose 50% Injectable 25 Gram(s) IV Push once  dextrose 50% Injectable 12.5 Gram(s) IV Push once  dextrose 50% Injectable 25 Gram(s) IV Push once  epoetin juan-epbx (RETACRIT) Injectable 08704 Unit(s) IV Push <User Schedule>  glucagon  Injectable 1 milliGRAM(s) IntraMuscular once  heparin  Infusion.  Unit(s)/Hr (12 mL/Hr) IV Continuous <Continuous>  insulin lispro (ADMELOG) corrective regimen sliding scale   SubCutaneous three times a day before meals  pantoprazole  Injectable 40 milliGRAM(s) IV Push every 12 hours  polyethylene glycol 3350 17 Gram(s) Oral daily  senna 2 Tablet(s) Oral at bedtime    MEDICATIONS  (PRN):  oxyCODONE    IR 2.5 milliGRAM(s) Oral every 3 hours PRN Moderate Pain (4 - 6)      Vital Signs Last 24 Hrs  T(C): 36.3 (29 Mar 2021 05:48), Max: 37.2 (28 Mar 2021 15:37)  T(F): 97.4 (29 Mar 2021 05:48), Max: 98.9 (28 Mar 2021 15:37)  HR: 56 (29 Mar 2021 05:48) (56 - 77)  BP: 181/65 (29 Mar 2021 05:48) (150/44 - 181/65)  BP(mean): 71 (28 Mar 2021 12:00) (71 - 82)  RR: 18 (29 Mar 2021 05:48) (15 - 23)  SpO2: 95% (29 Mar 2021 05:48) (95% - 100%)  Daily     Daily Weight in k.7 (29 Mar 2021 04:00)  ICU Vital Signs Last 24 Hrs  CHRISTIANO GLENN  I&O's Detail    28 Mar 2021 07:01  -  29 Mar 2021 07:00  --------------------------------------------------------  IN:    Heparin Infusion: 192 mL    Oral Fluid: 200 mL    sodium chloride 0.9%: 250 mL  Total IN: 642 mL    OUT:    Intermittent Catheterization - Urethral (mL): 301 mL  Total OUT: 301 mL    Total NET: 341 mL        I&O's Summary    28 Mar 2021 07:01  -  29 Mar 2021 07:00  --------------------------------------------------------  IN: 642 mL / OUT: 301 mL / NET: 341 mL      Drug Dosing Weight  CHRISTIANO ELIZABETH      PHYSICAL EXAM:  General: Appears well developed, well nourished alert and cooperative.  HEENT: Head; normocephalic, atraumatic.  Eyes: Pupils reactive, cornea wnl.  Neck: Supple, no nodes adenopathy, no NVD or carotid bruit or thyromegaly.  CARDIOVASCULAR: Normal S1 and S2, No murmur, rub, gallop or lift.   LUNGS: No rales, rhonchi or wheeze. Normal breath sounds bilaterally.  ABDOMEN: Soft, nontender without mass or organomegaly. bowel sounds normoactive.  EXTREMITIES: No clubbing, cyanosis or edema. Distal pulses wnl.   SKIN: warm and dry with normal turgor.  NEURO: Alert/oriented x 3/normal motor exam. No pathologic reflexes.    PSYCH: normal affect.        LABS:                        7.7    17.65 )-----------( 229      ( 29 Mar 2021 06:50 )             23.5         137  |  98  |  57.0<H>  ----------------------------<  139<H>  5.0   |  20.0<L>  |  5.39<H>    Ca    7.9<L>      29 Mar 2021 06:50  Phos  5.1       Mg     2.2         TPro  5.5<L>  /  Alb  1.8<L>  /  TBili  0.6  /  DBili  x   /  AST  44<H>  /  ALT  5   /  AlkPhos  180<H>      CHRISTIANO ELIZABETH  CARDIAC MARKERS ( 29 Mar 2021 06:50 )  x     / x     / 684 U/L / x     / 5.8 ng/mL  CARDIAC MARKERS ( 28 Mar 2021 05:00 )  x     / x     / 1004 U/L / x     / 8.5 ng/mL      PTT - ( 29 Mar 2021 06:50 )  PTT:78.0 sec      RADIOLOGY & ADDITIONAL STUDIES:    INTERPRETATION OF TELEMETRY (personally reviewed):    ECG:< from: 12 Lead ECG (21 @ 20:08) >    Diagnosis Line Ventricular-paced rhythm  Abnormal ECG    Confirmed by SHAR VICTORIA ([id number]) on 3/22/2021 9:33:36 PM    < end of copied text >      ECHO:< from: TTE Echo Complete w/o Contrast w/ Doppler (21 @ 11:36) >      Summary:   1. Moderately enlarged left atrium.   2. There is mild concentric left ventricular hypertrophy.   3. Left ventricular ejection fraction, by visual estimation, is 60 to 65%.   4. Grade II diastolic dysfunction. Elevated mean left atrial pressure.   5. Mildly enlarged right atrium.   6. Mildly enlarged right ventricle. Mildly reduced RV systolic function.   7. Mild mitral stenosis. Moderate mitral valve regurgitation. Elevated mean gradient likely due to volume overload. Repeat study after diuresis to erevaluate mitral valve. If clinically indicated.   8. 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 Well seated valve. Acceptable gradients. No regurgitation.   9. Mild tricuspid regurgitation.  10. Estimated pulmonary artery systolic pressure is 78 mmHg - severe pulmonary hypertension.  11. There is no evidence of pericardial effusion.    MD Rohith, RPVI Electronically signed on 3/14/2021 at 6:02:57 PM            < end of copied text >      STRESS TEST:

## 2021-03-29 NOTE — PROGRESS NOTE ADULT - SUBJECTIVE AND OBJECTIVE BOX
INTERVAL HPI/OVERNIGHT EVENTS:    SUBJECTIVE: Patient evaluated at bedside, found resting comfortably in bed, nad. No acute complaints or concerns. Pain well controlled. Denies sob, chest pain, n/v, f/c.       MEDICATIONS  (STANDING):  acetaminophen   Tablet .. 650 milliGRAM(s) Oral every 6 hours  apixaban 2.5 milliGRAM(s) Oral two times a day  clopidogrel Tablet 75 milliGRAM(s) Oral daily  dextrose 40% Gel 15 Gram(s) Oral once  dextrose 5%. 1000 milliLiter(s) (50 mL/Hr) IV Continuous <Continuous>  dextrose 5%. 1000 milliLiter(s) (100 mL/Hr) IV Continuous <Continuous>  dextrose 50% Injectable 25 Gram(s) IV Push once  dextrose 50% Injectable 12.5 Gram(s) IV Push once  dextrose 50% Injectable 25 Gram(s) IV Push once  epoetin juan-epbx (RETACRIT) Injectable 94085 Unit(s) IV Push <User Schedule>  glucagon  Injectable 1 milliGRAM(s) IntraMuscular once  insulin lispro (ADMELOG) corrective regimen sliding scale   SubCutaneous three times a day before meals  pantoprazole  Injectable 40 milliGRAM(s) IV Push every 12 hours  polyethylene glycol 3350 17 Gram(s) Oral daily  senna 2 Tablet(s) Oral at bedtime    MEDICATIONS  (PRN):  oxyCODONE    IR 2.5 milliGRAM(s) Oral every 3 hours PRN Moderate Pain (4 - 6)      Vital Signs Last 24 Hrs  T(C): 36.3 (29 Mar 2021 05:48), Max: 37.2 (28 Mar 2021 15:37)  T(F): 97.4 (29 Mar 2021 05:48), Max: 98.9 (28 Mar 2021 15:37)  HR: 56 (29 Mar 2021 05:48) (56 - 77)  BP: 181/65 (29 Mar 2021 05:48) (150/44 - 181/65)  BP(mean): 71 (28 Mar 2021 12:00) (71 - 82)  RR: 18 (29 Mar 2021 05:48) (18 - 23)  SpO2: 95% (29 Mar 2021 05:48) (95% - 100%)    PE  Gen: resting comfortably in bed, nad  Pulm: no increased wob, no conversational dyspnea  Abd: non-distended, soft, non-tender  Ext: 1+ edema RUE, LLE with incisions c/d/i, no surrounding skin changes, no signs of infection, biphasic L DP, L foot warm and well perfused, no skin mottling         I&O's Detail    28 Mar 2021 07:01  -  29 Mar 2021 07:00  --------------------------------------------------------  IN:    Heparin Infusion: 192 mL    Oral Fluid: 200 mL    sodium chloride 0.9%: 250 mL  Total IN: 642 mL    OUT:    Intermittent Catheterization - Urethral (mL): 301 mL  Total OUT: 301 mL    Total NET: 341 mL          LABS:                        7.7    17.65 )-----------( 229      ( 29 Mar 2021 06:50 )             23.5     03-29    137  |  98  |  57.0<H>  ----------------------------<  139<H>  5.0   |  20.0<L>  |  5.39<H>    Ca    7.9<L>      29 Mar 2021 06:50  Phos  5.1     03-29  Mg     2.2     03-29    TPro  5.5<L>  /  Alb  1.8<L>  /  TBili  0.6  /  DBili  x   /  AST  44<H>  /  ALT  5   /  AlkPhos  180<H>  03-29    PTT - ( 29 Mar 2021 06:50 )  PTT:78.0 sec      RADIOLOGY & ADDITIONAL STUDIES:

## 2021-03-29 NOTE — PROGRESS NOTE ADULT - ASSESSMENT
84 year old male with LLE critical limb ischemia s/p L CFA to below knee pop bypass with PTFE graft 3/20 which required revision with fem AT bypass with cryovein 3/25.     - heparin drip discontinued and Eliquis started for LLE DVT  -neurovascular checks  -continue dysphagia diet as recommended by speech/swallow  -OOB to chair  -continue plavix  -HD as per renal  -OT  -PT  -home pending PT evaluation and placement

## 2021-03-29 NOTE — PROGRESS NOTE ADULT - SUBJECTIVE AND OBJECTIVE BOX
[facility] DIVISION OF KIDNEY DISEASES AND HYPERTENSION -- HEMODIALYSIS NOTE  --------------------------------------------------------------------------------  Chief Complaint: ESRD/Ongoing hemodialysis requirement    24 hour events/subjective:    PAST HISTORY  --------------------------------------------------------------------------------  No significant changes to PMH, PSH, FHx, SHx, unless otherwise noted    ALLERGIES & MEDICATIONS  --------------------------------------------------------------------------------  Allergies    Plavix (Hives)  Toprol-XL (Rash)    Intolerances    Standing Inpatient Medications  acetaminophen   Tablet .. 650 milliGRAM(s) Oral every 6 hours  apixaban 2.5 milliGRAM(s) Oral two times a day  clopidogrel Tablet 75 milliGRAM(s) Oral daily  dextrose 40% Gel 15 Gram(s) Oral once  dextrose 5%. 1000 milliLiter(s) IV Continuous <Continuous>  dextrose 5%. 1000 milliLiter(s) IV Continuous <Continuous>  dextrose 50% Injectable 25 Gram(s) IV Push once  dextrose 50% Injectable 12.5 Gram(s) IV Push once  dextrose 50% Injectable 25 Gram(s) IV Push once  epoetin juan-epbx (RETACRIT) Injectable 01218 Unit(s) IV Push <User Schedule>  glucagon  Injectable 1 milliGRAM(s) IntraMuscular once  insulin lispro (ADMELOG) corrective regimen sliding scale   SubCutaneous three times a day before meals  pantoprazole  Injectable 40 milliGRAM(s) IV Push every 12 hours  polyethylene glycol 3350 17 Gram(s) Oral daily  senna 2 Tablet(s) Oral at bedtime    PRN Inpatient Medications  oxyCODONE    IR 2.5 milliGRAM(s) Oral every 3 hours PRN      REVIEW OF SYSTEMS  --------------------------------------------------------------------------------  Gen: No weight changes, fatigue, fevers/chills, weakness  Skin: No rashes  Head/Eyes/Ears/Mouth: No headache; Normal hearing; Normal vision w/o blurriness; No sinus pain/discomfort, sore throat  Respiratory: No dyspnea, cough, wheezing, hemoptysis  CV: No chest pain, PND, orthopnea  GI: No abdominal pain, diarrhea, constipation, nausea, vomiting, melena, hematochezia  : No increased frequency, dysuria, hematuria, nocturia  MSK: No joint pain/swelling; no back pain; no edema  Neuro: No dizziness/lightheadedness, weakness, seizures, numbness, tingling  Heme: No easy bruising or bleeding  Endo: No heat/cold intolerance  Psych: No significant nervousness, anxiety, stress, depression    All other systems were reviewed and are negative, except as noted.    VITALS/PHYSICAL EXAM  --------------------------------------------------------------------------------  T(C): 36.3 (03-29-21 @ 05:48), Max: 37.2 (03-28-21 @ 15:37)  HR: 56 (03-29-21 @ 05:48) (56 - 77)  BP: 181/65 (03-29-21 @ 05:48) (150/44 - 181/65)  RR: 18 (03-29-21 @ 05:48) (18 - 20)  SpO2: 95% (03-29-21 @ 05:48) (95% - 100%)    03-28-21 @ 07:01  -  03-29-21 @ 07:00  --------------------------------------------------------  IN: 642 mL / OUT: 301 mL / NET: 341 mL    Physical Exam:  	Gen: NAD, well-appearing, Pale,   	HEENT: PERRL, supple neck, clear oropharynx  	Pulm: CTA B/L  	CV: RRR, S1S2; no rub  	Back: No spinal or CVA tenderness; no sacral edema  	Abd: +BS, soft, nontender/nondistended  	: No suprapubic tenderness  	UE: Warm, FROM, no clubbing, intact strength; no edema; no asterixis  	LE: Warm, FROM, no clubbing, intact strength; no edema, Dressing Dry - LLE,   	Neuro: No focal deficits,   	Psych: Flat affect and mood  	Skin: Warm, without rashes  	Vascular access: AVF,     LABS/STUDIES  --------------------------------------------------------------------------------              7.7    17.65 >-----------<  229      [03-29-21 @ 06:50]              23.5     137  |  98  |  57.0  ----------------------------<  139      [03-29-21 @ 06:50]  5.0   |  20.0  |  5.39        Ca     7.9     [03-29-21 @ 06:50]      Mg     2.2     [03-29-21 @ 06:50]      Phos  5.1     [03-29-21 @ 06:50]    TPro  5.5  /  Alb  1.8  /  TBili  0.6  /  DBili  x   /  AST  44  /  ALT  5   /  AlkPhos  180  [03-29-21 @ 06:50]      PTT: 78.0       [03-29-21 @ 06:50]          [03-29-21 @ 06:50]    Iron 53, TIBC 266, %sat 20      [08-19-20 @ 06:32]  Ferritin 184      [08-19-20 @ 06:32]  PTH -- (Ca 9.6)      [08-19-20 @ 16:08]   91  Vitamin D (25OH) 26.4      [08-19-20 @ 16:08]  HbA1c 4.9      [08-09-18 @ 05:54]  TSH 2.16      [09-15-20 @ 02:01]

## 2021-03-29 NOTE — PROGRESS NOTE ADULT - ASSESSMENT
Assessment  s/p LLE bypass with revision and intraop blood loss repleted  transient PPM noncapture in setting of shock/acidosis - currently with normal function  underlying   DVT LLE on IV heparin  RUE swelling likely third spacing doubt DVT  s/p TAVR with normal EF  h/o PAF/PVCs previously on flecainide  h/o GIB sec to PUD  ESRD on HD        Rec  HD today  agree with doppler RUE  will follow

## 2021-03-29 NOTE — PROGRESS NOTE ADULT - SUBJECTIVE AND OBJECTIVE BOX
Patient was seen and evaluated on dialysis.   No c/o CP SOB NV  no F/C  + swelling    T(C): 36.4 (03-30-21 @ 05:41), Max: 37.3 (03-29-21 @ 22:35)  HR: 62 (03-30-21 @ 05:41) (60 - 65)  BP: 162/77 (03-30-21 @ 05:41) (152/83 - 175/62)  Wt(kg): --NA  PE ;  NAD, Pale,   lungs - CTA  CV gr 1 murmur,  No gallop or rub  Abd : soft, NT BS +, No masses  Ext- No edema  Neuro : Grossly intact, moving extremities                         9.2    17.49 )-----------( 238      ( 30 Mar 2021 06:21 )             27.6      03-29    137  |  98  |  57.0<H>  ----------------------------<  139<H>  5.0   |  20.0<L>  |  5.39<H>    Ca    7.9<L>      29 Mar 2021 06:50  Phos  5.1     03-29  Mg     2.2     03-29    TPro  5.5<L>  /  Alb  1.8<L>  /  TBili  0.6  /  DBili  x   /  AST  44<H>  /  ALT  5   /  AlkPhos  180<H>  03-29    MEDICATIONS  (STANDING):  acetaminophen   Tablet ..  apixaban  artificial tears (preservative free) Ophthalmic Solution PRN  clopidogrel Tablet  dextrose 40% Gel  dextrose 5%.  dextrose 5%.  dextrose 50% Injectable  dextrose 50% Injectable  dextrose 50% Injectable  epoetin juan-epbx (RETACRIT) Injectable  glucagon  Injectable  insulin lispro (ADMELOG) corrective regimen sliding scale  oxyCODONE    IR PRN  pantoprazole  Injectable  polyethylene glycol 3350  senna  tamsulosin    Patient stable  Josh HD easily  Continue

## 2021-03-30 LAB
APTT BLD: 32.5 SEC — SIGNIFICANT CHANGE UP (ref 27.5–35.5)
GLUCOSE BLDC GLUCOMTR-MCNC: 128 MG/DL — HIGH (ref 70–99)
GLUCOSE BLDC GLUCOMTR-MCNC: 153 MG/DL — HIGH (ref 70–99)
GLUCOSE BLDC GLUCOMTR-MCNC: 156 MG/DL — HIGH (ref 70–99)
GLUCOSE BLDC GLUCOMTR-MCNC: 224 MG/DL — HIGH (ref 70–99)
HCT VFR BLD CALC: 27.6 % — LOW (ref 39–50)
HGB BLD-MCNC: 9.2 G/DL — LOW (ref 13–17)
MCHC RBC-ENTMCNC: 29.7 PG — SIGNIFICANT CHANGE UP (ref 27–34)
MCHC RBC-ENTMCNC: 33.3 GM/DL — SIGNIFICANT CHANGE UP (ref 32–36)
MCV RBC AUTO: 89 FL — SIGNIFICANT CHANGE UP (ref 80–100)
PLATELET # BLD AUTO: 238 K/UL — SIGNIFICANT CHANGE UP (ref 150–400)
RBC # BLD: 3.1 M/UL — LOW (ref 4.2–5.8)
RBC # FLD: 16.4 % — HIGH (ref 10.3–14.5)
WBC # BLD: 17.49 K/UL — HIGH (ref 3.8–10.5)
WBC # FLD AUTO: 17.49 K/UL — HIGH (ref 3.8–10.5)

## 2021-03-30 PROCEDURE — 99233 SBSQ HOSP IP/OBS HIGH 50: CPT

## 2021-03-30 RX ORDER — HYDRALAZINE HCL 50 MG
25 TABLET ORAL
Refills: 0 | Status: DISCONTINUED | OUTPATIENT
Start: 2021-03-30 | End: 2021-03-30

## 2021-03-30 RX ORDER — HYDRALAZINE HCL 50 MG
50 TABLET ORAL
Refills: 0 | Status: DISCONTINUED | OUTPATIENT
Start: 2021-03-30 | End: 2021-04-02

## 2021-03-30 RX ORDER — TAMSULOSIN HYDROCHLORIDE 0.4 MG/1
0.4 CAPSULE ORAL AT BEDTIME
Refills: 0 | Status: DISCONTINUED | OUTPATIENT
Start: 2021-03-30 | End: 2021-04-08

## 2021-03-30 RX ORDER — NIFEDIPINE 30 MG
60 TABLET, EXTENDED RELEASE 24 HR ORAL AT BEDTIME
Refills: 0 | Status: DISCONTINUED | OUTPATIENT
Start: 2021-03-30 | End: 2021-04-02

## 2021-03-30 RX ORDER — NIFEDIPINE 30 MG
90 TABLET, EXTENDED RELEASE 24 HR ORAL DAILY
Refills: 0 | Status: DISCONTINUED | OUTPATIENT
Start: 2021-03-30 | End: 2021-04-02

## 2021-03-30 RX ORDER — NIFEDIPINE 30 MG
60 TABLET, EXTENDED RELEASE 24 HR ORAL DAILY
Refills: 0 | Status: DISCONTINUED | OUTPATIENT
Start: 2021-03-30 | End: 2021-03-30

## 2021-03-30 RX ADMIN — POLYETHYLENE GLYCOL 3350 17 GRAM(S): 17 POWDER, FOR SOLUTION ORAL at 13:20

## 2021-03-30 RX ADMIN — Medication 650 MILLIGRAM(S): at 00:00

## 2021-03-30 RX ADMIN — Medication 50 MILLIGRAM(S): at 19:50

## 2021-03-30 RX ADMIN — APIXABAN 2.5 MILLIGRAM(S): 2.5 TABLET, FILM COATED ORAL at 22:32

## 2021-03-30 RX ADMIN — Medication 25 MILLIGRAM(S): at 17:25

## 2021-03-30 RX ADMIN — Medication 650 MILLIGRAM(S): at 17:15

## 2021-03-30 RX ADMIN — Medication 650 MILLIGRAM(S): at 22:31

## 2021-03-30 RX ADMIN — Medication 60 MILLIGRAM(S): at 17:14

## 2021-03-30 RX ADMIN — Medication 3: at 17:15

## 2021-03-30 RX ADMIN — PANTOPRAZOLE SODIUM 40 MILLIGRAM(S): 20 TABLET, DELAYED RELEASE ORAL at 17:16

## 2021-03-30 RX ADMIN — Medication 650 MILLIGRAM(S): at 23:13

## 2021-03-30 RX ADMIN — Medication 3: at 12:08

## 2021-03-30 RX ADMIN — APIXABAN 2.5 MILLIGRAM(S): 2.5 TABLET, FILM COATED ORAL at 12:07

## 2021-03-30 RX ADMIN — TAMSULOSIN HYDROCHLORIDE 0.4 MILLIGRAM(S): 0.4 CAPSULE ORAL at 22:32

## 2021-03-30 RX ADMIN — SENNA PLUS 2 TABLET(S): 8.6 TABLET ORAL at 22:30

## 2021-03-30 RX ADMIN — CLOPIDOGREL BISULFATE 75 MILLIGRAM(S): 75 TABLET, FILM COATED ORAL at 12:07

## 2021-03-30 RX ADMIN — PANTOPRAZOLE SODIUM 40 MILLIGRAM(S): 20 TABLET, DELAYED RELEASE ORAL at 05:58

## 2021-03-30 RX ADMIN — OXYCODONE HYDROCHLORIDE 2.5 MILLIGRAM(S): 5 TABLET ORAL at 01:20

## 2021-03-30 RX ADMIN — Medication 650 MILLIGRAM(S): at 12:07

## 2021-03-30 RX ADMIN — Medication 650 MILLIGRAM(S): at 14:18

## 2021-03-30 RX ADMIN — OXYCODONE HYDROCHLORIDE 2.5 MILLIGRAM(S): 5 TABLET ORAL at 00:18

## 2021-03-30 NOTE — OCCUPATIONAL THERAPY INITIAL EVALUATION ADULT - PLANNED THERAPY INTERVENTIONS, OT EVAL
ADL retraining/balance training/bed mobility training/cognitive, visual perceptual/fine motor coordination training/motor coordination training/ROM/strengthening/transfer training

## 2021-03-30 NOTE — OCCUPATIONAL THERAPY INITIAL EVALUATION ADULT - RANGE OF MOTION EXAMINATION, UPPER EXTREMITY
bilateral shoulders active ROM to 90, appears limited by fatigue.  Bilateral elbows to digits WFL but +swelling bilateral hands.

## 2021-03-30 NOTE — OCCUPATIONAL THERAPY INITIAL EVALUATION ADULT - PERTINENT HX OF CURRENT PROBLEM, REHAB EVAL
Pt is s/p LLE bypass revision with explant of PTFE bypass, CFA to AT bypass with cryovein on 3/25/21.

## 2021-03-30 NOTE — OCCUPATIONAL THERAPY INITIAL EVALUATION ADULT - GENERAL OBSERVATIONS, REHAB EVAL
Pt received seated in chair after PT, +bilateral VCDs, +IV, +carrera, +cardiac monitor, +O2 via nasal canula, sleepy but agreeable to OT eval.

## 2021-03-30 NOTE — OCCUPATIONAL THERAPY INITIAL EVALUATION ADULT - ADDITIONAL COMMENTS
As per pt's son Raul at bedside, pt was modified independent with all self care except for showering.  Family assists pt with driving, food shopping, and bathing.  Pt unable to assist with report of history and home setup due to lethargy and confusion.  Pt has 3 steps to enter with 1 HR.  Once inside, there are no steps to manage.  Pt has RW, commode, shower seat.

## 2021-03-30 NOTE — OCCUPATIONAL THERAPY INITIAL EVALUATION ADULT - LEVEL OF INDEPENDENCE: EATING, OT EVAL
Pt needs verbal cues to initiate.  Pt needs verbal cues to continue./maximum assist (25% patients effort)

## 2021-03-30 NOTE — OCCUPATIONAL THERAPY INITIAL EVALUATION ADULT - LEVEL OF INDEPENDENCE: BED TO CHAIR, REHAB EVAL
pt unable to tolerate at this time.  Pt recently transferred to chair with PT Iván.  As per PT, pt dependent with squat pivot transfer.  At time of OT eval pt too lethargic for safe transfer.  Mechanical lift recommended for nursing staff at this time./unable to perform

## 2021-03-30 NOTE — OCCUPATIONAL THERAPY INITIAL EVALUATION ADULT - VISUAL ACUITY
No visual complaints at this time.  Pt unable to keep eyes open consistently.  Pt very lethargic, confused, following commands 50% of time.  Assess once pt more alert.

## 2021-03-30 NOTE — OCCUPATIONAL THERAPY INITIAL EVALUATION ADULT - NS ASR OT EQUIP NEEDS DISCH
Pending progress, pt may need RW, wheelchair, commode, Tub bench.  Pt already has RW and commode as per son Raul.

## 2021-03-30 NOTE — PROGRESS NOTE ADULT - SUBJECTIVE AND OBJECTIVE BOX
INTERVAL HPI/OVERNIGHT EVENTS:    No acute overnight events reported. Patient seen at bedside with no major complaints.      MEDICATIONS  (STANDING):  acetaminophen   Tablet .. 650 milliGRAM(s) Oral every 6 hours  apixaban 2.5 milliGRAM(s) Oral <User Schedule>  clopidogrel Tablet 75 milliGRAM(s) Oral daily  dextrose 40% Gel 15 Gram(s) Oral once  dextrose 5%. 1000 milliLiter(s) (50 mL/Hr) IV Continuous <Continuous>  dextrose 5%. 1000 milliLiter(s) (100 mL/Hr) IV Continuous <Continuous>  dextrose 50% Injectable 25 Gram(s) IV Push once  dextrose 50% Injectable 12.5 Gram(s) IV Push once  dextrose 50% Injectable 25 Gram(s) IV Push once  epoetin juan-epbx (RETACRIT) Injectable 41569 Unit(s) IV Push <User Schedule>  glucagon  Injectable 1 milliGRAM(s) IntraMuscular once  insulin lispro (ADMELOG) corrective regimen sliding scale   SubCutaneous three times a day before meals  pantoprazole  Injectable 40 milliGRAM(s) IV Push every 12 hours  polyethylene glycol 3350 17 Gram(s) Oral daily  senna 2 Tablet(s) Oral at bedtime  tamsulosin 0.4 milliGRAM(s) Oral at bedtime    MEDICATIONS  (PRN):  artificial tears (preservative free) Ophthalmic Solution 1 Drop(s) Both EYES two times a day PRN Dry Eyes  oxyCODONE    IR 2.5 milliGRAM(s) Oral every 3 hours PRN Moderate Pain (4 - 6)      Vital Signs Last 24 Hrs  T(C): 36.4 (30 Mar 2021 05:41), Max: 37.3 (29 Mar 2021 22:35)  T(F): 97.5 (30 Mar 2021 05:41), Max: 99.1 (29 Mar 2021 22:35)  HR: 62 (30 Mar 2021 05:41) (60 - 65)  BP: 162/77 (30 Mar 2021 05:41) (152/83 - 175/62)  BP(mean): --  RR: 18 (30 Mar 2021 05:41) (18 - 18)  SpO2: 100% (30 Mar 2021 05:41) (97% - 100%)    PE  Gen: resting comfortably in bed, nad  Pulm: no increased wob, no conversational dyspnea  Abd: non-distended, soft, non-tender  Ext: 1+ edema RUE, LLE with incisions c/d/i, no surrounding skin changes, no signs of infection, biphasic L DP, L foot warm and well perfused, no skin mottling. Island dressing changed.        I&O's Detail    29 Mar 2021 07:01  -  30 Mar 2021 07:00  --------------------------------------------------------  IN:    Oral Fluid: 200 mL    PRBCs (Packed Red Blood Cells): 650 mL  Total IN: 850 mL    OUT:    Other (mL): 500 mL  Total OUT: 500 mL    Total NET: 350 mL          LABS:                        9.2    17.49 )-----------( 238      ( 30 Mar 2021 06:21 )             27.6     03-29    137  |  98  |  57.0<H>  ----------------------------<  139<H>  5.0   |  20.0<L>  |  5.39<H>    Ca    7.9<L>      29 Mar 2021 06:50  Phos  5.1     03-29  Mg     2.2     03-29    TPro  5.5<L>  /  Alb  1.8<L>  /  TBili  0.6  /  DBili  x   /  AST  44<H>  /  ALT  5   /  AlkPhos  180<H>  03-29    PTT - ( 30 Mar 2021 06:21 )  PTT:32.5 sec      RADIOLOGY & ADDITIONAL STUDIES:

## 2021-03-30 NOTE — OCCUPATIONAL THERAPY INITIAL EVALUATION ADULT - ORIENTATION, REHAB EVAL
With repetition of questions, pt states he's in hospital and year is 2021, month is March.  Increased time and effort to answer simple questions on eval./person

## 2021-03-30 NOTE — OCCUPATIONAL THERAPY INITIAL EVALUATION ADULT - PERSONAL SAFETY AND JUDGMENT, REHAB EVAL
ongoing assessment. Pt very lethargic, decreased attention but states he will use call bell for assist with all needs.  Handoff to RN./at risk behaviors demonstrated

## 2021-03-30 NOTE — OCCUPATIONAL THERAPY INITIAL EVALUATION ADULT - MANUAL MUSCLE TESTING RESULTS, REHAB EVAL
difficulty attending to formal assessment, following commands.  Bilateral shoulders 3-/5, bilateral elbows to digits appear ~ 3+/5 throughout/grossly assessed due to

## 2021-03-30 NOTE — PROGRESS NOTE ADULT - SUBJECTIVE AND OBJECTIVE BOX
Overnight events/patient complaints: tele unremarkable, OOB in chair, less edematous after HD yest    Plavix (Hives)  Toprol-XL (Rash)    MEDICATIONS  (STANDING):  acetaminophen   Tablet .. 650 milliGRAM(s) Oral every 6 hours  apixaban 2.5 milliGRAM(s) Oral <User Schedule>  clopidogrel Tablet 75 milliGRAM(s) Oral daily  dextrose 40% Gel 15 Gram(s) Oral once  dextrose 5%. 1000 milliLiter(s) (50 mL/Hr) IV Continuous <Continuous>  dextrose 5%. 1000 milliLiter(s) (100 mL/Hr) IV Continuous <Continuous>  dextrose 50% Injectable 25 Gram(s) IV Push once  dextrose 50% Injectable 12.5 Gram(s) IV Push once  dextrose 50% Injectable 25 Gram(s) IV Push once  epoetin juan-epbx (RETACRIT) Injectable 13597 Unit(s) IV Push <User Schedule>  glucagon  Injectable 1 milliGRAM(s) IntraMuscular once  insulin lispro (ADMELOG) corrective regimen sliding scale   SubCutaneous three times a day before meals  pantoprazole  Injectable 40 milliGRAM(s) IV Push every 12 hours  polyethylene glycol 3350 17 Gram(s) Oral daily  senna 2 Tablet(s) Oral at bedtime  tamsulosin 0.4 milliGRAM(s) Oral at bedtime    MEDICATIONS  (PRN):  artificial tears (preservative free) Ophthalmic Solution 1 Drop(s) Both EYES two times a day PRN Dry Eyes  oxyCODONE    IR 2.5 milliGRAM(s) Oral every 3 hours PRN Moderate Pain (4 - 6)      Vital Signs Last 24 Hrs  T(C): 36.4 (30 Mar 2021 05:41), Max: 37.3 (29 Mar 2021 22:35)  T(F): 97.5 (30 Mar 2021 05:41), Max: 99.1 (29 Mar 2021 22:35)  HR: 62 (30 Mar 2021 05:41) (60 - 65)  BP: 162/77 (30 Mar 2021 05:41) (152/83 - 175/62)  BP(mean): --  RR: 18 (30 Mar 2021 05:41) (18 - 18)  SpO2: 100% (30 Mar 2021 05:41) (97% - 100%)  Daily     Daily Weight in k.7 (30 Mar 2021 04:18)  ICU Vital Signs Last 24 Hrs  CHRISTIANO ELIZABETH  I&O's Detail    29 Mar 2021 07:01  -  30 Mar 2021 07:00  --------------------------------------------------------  IN:    Oral Fluid: 200 mL    PRBCs (Packed Red Blood Cells): 650 mL  Total IN: 850 mL    OUT:    Other (mL): 500 mL  Total OUT: 500 mL    Total NET: 350 mL        I&O's Summary    29 Mar 2021 07:01  -  30 Mar 2021 07:00  --------------------------------------------------------  IN: 850 mL / OUT: 500 mL / NET: 350 mL      Drug Dosing Weight  CHRISTIANO ELIZABETH      PHYSICAL EXAM:  General: Appears well developed, well nourished alert and cooperative.  HEENT: Head; normocephalic, atraumatic.  Eyes: Pupils reactive, cornea wnl.  Neck: Supple, no nodes adenopathy, no NVD or carotid bruit or thyromegaly.  CARDIOVASCULAR: Normal S1 and S2, No murmur, rub, gallop or lift.   LUNGS: rales at right base  ABDOMEN: Soft, nontender without mass or organomegaly. bowel sounds normoactive.  EXTREMITIES: No clubbing, cyanosis or edema. Distal pulses wnl.   SKIN: warm and dry with normal turgor.  NEURO: Alert/oriented x 3/normal motor exam. No pathologic reflexes.    PSYCH: normal affect.        LABS:                        9.2    17.49 )-----------( 238      ( 30 Mar 2021 06:21 )             27.6     03-    137  |  98  |  57.0<H>  ----------------------------<  139<H>  5.0   |  20.0<L>  |  5.39<H>    Ca    7.9<L>      29 Mar 2021 06:50  Phos  5.1       Mg     2.2         TPro  5.5<L>  /  Alb  1.8<L>  /  TBili  0.6  /  DBili  x   /  AST  44<H>  /  ALT  5   /  AlkPhos  180<H>      CHRISTIANO ELIZABETH  CARDIAC MARKERS ( 29 Mar 2021 06:50 )  x     / x     / 684 U/L / x     / 5.8 ng/mL      PTT - ( 30 Mar 2021 06:21 )  PTT:32.5 sec      RADIOLOGY & ADDITIONAL STUDIES:    INTERPRETATION OF TELEMETRY (personally reviewed):    ECG:< from: 12 Lead ECG (21 @ 20:08) >  Diagnosis Line Ventricular-paced rhythm  Abnormal ECG    Confirmed by SHAR VICTORIA ([id number]) on 3/22/2021 9:33:36 PM    < end of copied text >      ECHO:< from: TTE Echo Complete w/o Contrast w/ Doppler (21 @ 11:36) >    Summary:   1. Moderately enlarged left atrium.   2. There is mild concentric left ventricular hypertrophy.   3. Left ventricular ejection fraction, by visual estimation, is 60 to 65%.   4. Grade II diastolic dysfunction. Elevated mean left atrial pressure.   5. Mildly enlarged right atrium.   6. Mildly enlarged right ventricle. Mildly reduced RV systolic function.   7. Mild mitral stenosis. Moderate mitral valve regurgitation. Elevated mean gradient likely due to volume overload. Repeat study after diuresis to erevaluate mitral valve. If clinically indicated.   8. 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 Well seated valve. Acceptable gradients. No regurgitation.   9. Mild tricuspid regurgitation.  10. Estimated pulmonary artery systolic pressure is 78 mmHg - severe pulmonary hypertension.  11. There is no evidence of pericardial effusion.    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

## 2021-03-30 NOTE — PROGRESS NOTE ADULT - ASSESSMENT
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

## 2021-03-30 NOTE — OCCUPATIONAL THERAPY INITIAL EVALUATION ADULT - FINE MOTOR COORDINATION, LEFT HAND, MANIPULATION OF OBJECTS, OT EVAL
Pt demonstrates ability to push call bell, hold utensils, but with decreased endurance doing so.  Limited assessment due to cognition at time of eval./mild impairment

## 2021-03-30 NOTE — PROGRESS NOTE ADULT - SUBJECTIVE AND OBJECTIVE BOX
[facility]                                   [address] [place] [address]                                                      PHONE: [phone]                                                         FAX: [phone]  [email or web address]  ---------------------------------------------------------------------------------------------------------------------------------    Overnight events/patient complaints: tele unremarkable, OOB in chair, less edematous after HD yest      Plavix (Hives)  Toprol-XL (Rash)    MEDICATIONS  (STANDING):  acetaminophen   Tablet .. 650 milliGRAM(s) Oral every 6 hours  apixaban 2.5 milliGRAM(s) Oral <User Schedule>  clopidogrel Tablet 75 milliGRAM(s) Oral daily  dextrose 40% Gel 15 Gram(s) Oral once  dextrose 5%. 1000 milliLiter(s) (50 mL/Hr) IV Continuous <Continuous>  dextrose 5%. 1000 milliLiter(s) (100 mL/Hr) IV Continuous <Continuous>  dextrose 50% Injectable 25 Gram(s) IV Push once  dextrose 50% Injectable 12.5 Gram(s) IV Push once  dextrose 50% Injectable 25 Gram(s) IV Push once  epoetin juan-epbx (RETACRIT) Injectable 97414 Unit(s) IV Push <User Schedule>  glucagon  Injectable 1 milliGRAM(s) IntraMuscular once  insulin lispro (ADMELOG) corrective regimen sliding scale   SubCutaneous three times a day before meals  pantoprazole  Injectable 40 milliGRAM(s) IV Push every 12 hours  polyethylene glycol 3350 17 Gram(s) Oral daily  senna 2 Tablet(s) Oral at bedtime  tamsulosin 0.4 milliGRAM(s) Oral at bedtime    MEDICATIONS  (PRN):  artificial tears (preservative free) Ophthalmic Solution 1 Drop(s) Both EYES two times a day PRN Dry Eyes  oxyCODONE    IR 2.5 milliGRAM(s) Oral every 3 hours PRN Moderate Pain (4 - 6)      Vital Signs Last 24 Hrs  T(C): 36.4 (30 Mar 2021 05:41), Max: 37.3 (29 Mar 2021 22:35)  T(F): 97.5 (30 Mar 2021 05:41), Max: 99.1 (29 Mar 2021 22:35)  HR: 62 (30 Mar 2021 05:41) (60 - 65)  BP: 162/77 (30 Mar 2021 05:41) (152/83 - 175/62)  BP(mean): --  RR: 18 (30 Mar 2021 05:41) (18 - 18)  SpO2: 100% (30 Mar 2021 05:41) (97% - 100%)  Daily     Daily Weight in k.7 (30 Mar 2021 04:18)  ICU Vital Signs Last 24 Hrs  CHRISTIANO GLENN  I&O's Detail    29 Mar 2021 07:01  -  30 Mar 2021 07:00  --------------------------------------------------------  IN:    Oral Fluid: 200 mL    PRBCs (Packed Red Blood Cells): 650 mL  Total IN: 850 mL    OUT:    Other (mL): 500 mL  Total OUT: 500 mL    Total NET: 350 mL        I&O's Summary    29 Mar 2021 07:01  -  30 Mar 2021 07:00  --------------------------------------------------------  IN: 850 mL / OUT: 500 mL / NET: 350 mL      Drug Dosing Weight  CHRISTIANO PROCRYSTAL      PHYSICAL EXAM:  General: Appears well developed, well nourished alert and cooperative.  HEENT: Head; normocephalic, atraumatic.  Eyes: Pupils reactive, cornea wnl.  Neck: Supple, no nodes adenopathy, no NVD or carotid bruit or thyromegaly.  CARDIOVASCULAR: Normal S1 and S2, No murmur, rub, gallop or lift.   LUNGS: rales at right base  ABDOMEN: Soft, nontender without mass or organomegaly. bowel sounds normoactive.  EXTREMITIES: No clubbing, cyanosis or edema. Distal pulses wnl.   SKIN: warm and dry with normal turgor.  NEURO: Alert/oriented x 3/normal motor exam. No pathologic reflexes.    PSYCH: normal affect.        LABS:                        9.2    17.49 )-----------( 238      ( 30 Mar 2021 06:21 )             27.6         137  |  98  |  57.0<H>  ----------------------------<  139<H>  5.0   |  20.0<L>  |  5.39<H>    Ca    7.9<L>      29 Mar 2021 06:50  Phos  5.1       Mg     2.2         TPro  5.5<L>  /  Alb  1.8<L>  /  TBili  0.6  /  DBili  x   /  AST  44<H>  /  ALT  5   /  AlkPhos  180<H>      CHRISTIANO GLENN  CARDIAC MARKERS ( 29 Mar 2021 06:50 )  x     / x     / 684 U/L / x     / 5.8 ng/mL      PTT - ( 30 Mar 2021 06:21 )  PTT:32.5 sec      RADIOLOGY & ADDITIONAL STUDIES:    INTERPRETATION OF TELEMETRY (personally reviewed):    ECG:< from: 12 Lead ECG (21 @ 20:08) >  Diagnosis Line Ventricular-paced rhythm  Abnormal ECG    Confirmed by SHAR VICTORIA ([id number]) on 3/22/2021 9:33:36 PM    < end of copied text >      ECHO:< from: TTE Echo Complete w/o Contrast w/ Doppler (21 @ 11:36) >    Summary:   1. Moderately enlarged left atrium.   2. There is mild concentric left ventricular hypertrophy.   3. Left ventricular ejection fraction, by visual estimation, is 60 to 65%.   4. Grade II diastolic dysfunction. Elevated mean left atrial pressure.   5. Mildly enlarged right atrium.   6. Mildly enlarged right ventricle. Mildly reduced RV systolic function.   7. Mild mitral stenosis. Moderate mitral valve regurgitation. Elevated mean gradient likely due to volume overload. Repeat study after diuresis to erevaluate mitral valve. If clinically indicated.   8. 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 Well seated valve. Acceptable gradients. No regurgitation.   9. Mild tricuspid regurgitation.  10. Estimated pulmonary artery systolic pressure is 78 mmHg - severe pulmonary hypertension.  11. There is no evidence of pericardial effusion.    MD Rohith, RPVI Electronically signed on 3/14/2021 at 6:02:57 PM            *** Final ***              < end of copied text >

## 2021-03-31 LAB
ANISOCYTOSIS BLD QL: SLIGHT — SIGNIFICANT CHANGE UP
BASOPHILS # BLD AUTO: 0 K/UL — SIGNIFICANT CHANGE UP (ref 0–0.2)
BASOPHILS NFR BLD AUTO: 0 % — SIGNIFICANT CHANGE UP (ref 0–2)
EOSINOPHIL # BLD AUTO: 0.1 K/UL — SIGNIFICANT CHANGE UP (ref 0–0.5)
EOSINOPHIL NFR BLD AUTO: 0.9 % — SIGNIFICANT CHANGE UP (ref 0–6)
GIANT PLATELETS BLD QL SMEAR: PRESENT — SIGNIFICANT CHANGE UP
GLUCOSE BLDC GLUCOMTR-MCNC: 113 MG/DL — HIGH (ref 70–99)
GLUCOSE BLDC GLUCOMTR-MCNC: 191 MG/DL — HIGH (ref 70–99)
GLUCOSE BLDC GLUCOMTR-MCNC: 197 MG/DL — HIGH (ref 70–99)
GLUCOSE BLDC GLUCOMTR-MCNC: 235 MG/DL — HIGH (ref 70–99)
GLUCOSE BLDC GLUCOMTR-MCNC: 252 MG/DL — HIGH (ref 70–99)
HCT VFR BLD CALC: 24.8 % — LOW (ref 39–50)
HGB BLD-MCNC: 8.5 G/DL — LOW (ref 13–17)
LYMPHOCYTES # BLD AUTO: 0.27 K/UL — LOW (ref 1–3.3)
LYMPHOCYTES # BLD AUTO: 2.6 % — LOW (ref 13–44)
MACROCYTES BLD QL: SLIGHT — SIGNIFICANT CHANGE UP
MANUAL SMEAR VERIFICATION: SIGNIFICANT CHANGE UP
MCHC RBC-ENTMCNC: 30.5 PG — SIGNIFICANT CHANGE UP (ref 27–34)
MCHC RBC-ENTMCNC: 34.3 GM/DL — SIGNIFICANT CHANGE UP (ref 32–36)
MCV RBC AUTO: 88.9 FL — SIGNIFICANT CHANGE UP (ref 80–100)
METAMYELOCYTES # FLD: 1.7 % — HIGH (ref 0–0)
MONOCYTES # BLD AUTO: 0.45 K/UL — SIGNIFICANT CHANGE UP (ref 0–0.9)
MONOCYTES NFR BLD AUTO: 4.3 % — SIGNIFICANT CHANGE UP (ref 2–14)
NEUTROPHILS # BLD AUTO: 9.57 K/UL — HIGH (ref 1.8–7.4)
NEUTROPHILS NFR BLD AUTO: 87 % — HIGH (ref 43–77)
NEUTS BAND # BLD: 3.5 % — SIGNIFICANT CHANGE UP (ref 0–8)
PLAT MORPH BLD: NORMAL — SIGNIFICANT CHANGE UP
PLATELET # BLD AUTO: 266 K/UL — SIGNIFICANT CHANGE UP (ref 150–400)
POLYCHROMASIA BLD QL SMEAR: SIGNIFICANT CHANGE UP
RBC # BLD: 2.79 M/UL — LOW (ref 4.2–5.8)
RBC # FLD: 15.7 % — HIGH (ref 10.3–14.5)
RBC BLD AUTO: ABNORMAL
WBC # BLD: 10.57 K/UL — HIGH (ref 3.8–10.5)
WBC # FLD AUTO: 10.57 K/UL — HIGH (ref 3.8–10.5)

## 2021-03-31 PROCEDURE — 99233 SBSQ HOSP IP/OBS HIGH 50: CPT

## 2021-03-31 RX ORDER — DULOXETINE HYDROCHLORIDE 30 MG/1
60 CAPSULE, DELAYED RELEASE ORAL DAILY
Refills: 0 | Status: DISCONTINUED | OUTPATIENT
Start: 2021-03-31 | End: 2021-04-08

## 2021-03-31 RX ORDER — TRAMADOL HYDROCHLORIDE 50 MG/1
25 TABLET ORAL EVERY 4 HOURS
Refills: 0 | Status: DISCONTINUED | OUTPATIENT
Start: 2021-03-31 | End: 2021-04-06

## 2021-03-31 RX ADMIN — Medication 650 MILLIGRAM(S): at 17:11

## 2021-03-31 RX ADMIN — Medication 3: at 08:21

## 2021-03-31 RX ADMIN — TRAMADOL HYDROCHLORIDE 25 MILLIGRAM(S): 50 TABLET ORAL at 15:52

## 2021-03-31 RX ADMIN — APIXABAN 2.5 MILLIGRAM(S): 2.5 TABLET, FILM COATED ORAL at 22:02

## 2021-03-31 RX ADMIN — CLOPIDOGREL BISULFATE 75 MILLIGRAM(S): 75 TABLET, FILM COATED ORAL at 13:12

## 2021-03-31 RX ADMIN — APIXABAN 2.5 MILLIGRAM(S): 2.5 TABLET, FILM COATED ORAL at 13:12

## 2021-03-31 RX ADMIN — Medication 90 MILLIGRAM(S): at 05:35

## 2021-03-31 RX ADMIN — POLYETHYLENE GLYCOL 3350 17 GRAM(S): 17 POWDER, FOR SOLUTION ORAL at 13:12

## 2021-03-31 RX ADMIN — Medication 650 MILLIGRAM(S): at 06:35

## 2021-03-31 RX ADMIN — Medication 650 MILLIGRAM(S): at 13:11

## 2021-03-31 RX ADMIN — SENNA PLUS 2 TABLET(S): 8.6 TABLET ORAL at 22:02

## 2021-03-31 RX ADMIN — Medication 3: at 17:13

## 2021-03-31 RX ADMIN — DULOXETINE HYDROCHLORIDE 60 MILLIGRAM(S): 30 CAPSULE, DELAYED RELEASE ORAL at 15:53

## 2021-03-31 RX ADMIN — TAMSULOSIN HYDROCHLORIDE 0.4 MILLIGRAM(S): 0.4 CAPSULE ORAL at 22:02

## 2021-03-31 RX ADMIN — Medication 6: at 13:11

## 2021-03-31 RX ADMIN — PANTOPRAZOLE SODIUM 40 MILLIGRAM(S): 20 TABLET, DELAYED RELEASE ORAL at 17:11

## 2021-03-31 RX ADMIN — PANTOPRAZOLE SODIUM 40 MILLIGRAM(S): 20 TABLET, DELAYED RELEASE ORAL at 05:35

## 2021-03-31 RX ADMIN — Medication 650 MILLIGRAM(S): at 05:35

## 2021-03-31 NOTE — PROGRESS NOTE ADULT - ASSESSMENT
84 year old male with LLE critical limb ischemia s/p L CFA to below knee pop bypass with PTFE graft 3/20 which required revision with fem AT bypass with cryovein 3/25.     -Plavix, Eliquis  -Continue pureed DASH  -neurovascular checks  -OOB to chair  -HD as per renal  -PT – [facility], will continue to work with him while inpatient  -daily island dressings to incisions with ACE wrap  -OT: [facility] vs Home w/ OT,  pt may need RW, wheelchair, commode, Tub bench.

## 2021-03-31 NOTE — PROGRESS NOTE ADULT - SUBJECTIVE AND OBJECTIVE BOX
INTERVAL HPI/OVERNIGHT EVENTS:    No acute overnight events reported. Patient seen at bedside with no active complaints. Home medication regimen reinstated yesterday will monitor BP. Currently denies nausea, emesis, SOB, chest pain, fevers nor chills.    MEDICATIONS  (STANDING):  acetaminophen   Tablet .. 650 milliGRAM(s) Oral every 6 hours  apixaban 2.5 milliGRAM(s) Oral <User Schedule>  clopidogrel Tablet 75 milliGRAM(s) Oral daily  dextrose 40% Gel 15 Gram(s) Oral once  dextrose 5%. 1000 milliLiter(s) (50 mL/Hr) IV Continuous <Continuous>  dextrose 5%. 1000 milliLiter(s) (100 mL/Hr) IV Continuous <Continuous>  dextrose 50% Injectable 25 Gram(s) IV Push once  dextrose 50% Injectable 12.5 Gram(s) IV Push once  dextrose 50% Injectable 25 Gram(s) IV Push once  epoetin juan-epbx (RETACRIT) Injectable 43109 Unit(s) IV Push <User Schedule>  glucagon  Injectable 1 milliGRAM(s) IntraMuscular once  hydrALAZINE 50 milliGRAM(s) Oral two times a day  insulin lispro (ADMELOG) corrective regimen sliding scale   SubCutaneous three times a day before meals  NIFEdipine XL 60 milliGRAM(s) Oral at bedtime  NIFEdipine XL 90 milliGRAM(s) Oral daily  pantoprazole  Injectable 40 milliGRAM(s) IV Push every 12 hours  polyethylene glycol 3350 17 Gram(s) Oral daily  senna 2 Tablet(s) Oral at bedtime  tamsulosin 0.4 milliGRAM(s) Oral at bedtime  torsemide 20 milliGRAM(s) Oral daily    MEDICATIONS  (PRN):  artificial tears (preservative free) Ophthalmic Solution 1 Drop(s) Both EYES two times a day PRN Dry Eyes  oxyCODONE    IR 2.5 milliGRAM(s) Oral every 3 hours PRN Moderate Pain (4 - 6)      Vital Signs Last 24 Hrs  T(C): 36.6 (30 Mar 2021 22:19), Max: 36.8 (30 Mar 2021 16:14)  T(F): 97.8 (30 Mar 2021 22:19), Max: 98.2 (30 Mar 2021 16:14)  HR: 51 (30 Mar 2021 23:55) (51 - 67)  BP: 115/51 (30 Mar 2021 23:55) (108/40 - 172/62)  BP(mean): --  RR: 17 (30 Mar 2021 23:55) (17 - 19)  SpO2: 96% (30 Mar 2021 23:55) (96% - 100%)    PE  Gen: resting comfortably in bed, nad  Pulm: no increased wob, no conversational dyspnea  Abd: non-distended, soft, non-tender  Ext: 1+ edema RUE, LLE with incisions c/d/i, no surrounding skin changes, no signs of infection, biphasic L DP, L foot warm and well perfused, no skin mottling. Island dressing       I&O's Detail    29 Mar 2021 07:01  -  30 Mar 2021 07:00  --------------------------------------------------------  IN:    Oral Fluid: 200 mL    PRBCs (Packed Red Blood Cells): 650 mL  Total IN: 850 mL    OUT:    Other (mL): 500 mL  Total OUT: 500 mL    Total NET: 350 mL      30 Mar 2021 07:01  -  31 Mar 2021 03:43  --------------------------------------------------------  IN:    Oral Fluid: 520 mL  Total IN: 520 mL    OUT:  Total OUT: 0 mL    Total NET: 520 mL          LABS:                        9.2    17.49 )-----------( 238      ( 30 Mar 2021 06:21 )             27.6     03-29    137  |  98  |  57.0<H>  ----------------------------<  139<H>  5.0   |  20.0<L>  |  5.39<H>    Ca    7.9<L>      29 Mar 2021 06:50  Phos  5.1     03-29  Mg     2.2     03-29    TPro  5.5<L>  /  Alb  1.8<L>  /  TBili  0.6  /  DBili  x   /  AST  44<H>  /  ALT  5   /  AlkPhos  180<H>  03-29    PTT - ( 30 Mar 2021 06:21 )  PTT:32.5 sec      RADIOLOGY & ADDITIONAL STUDIES:

## 2021-03-31 NOTE — PROGRESS NOTE ADULT - ASSESSMENT
Assessment  s/p LLE bypass with revision  postop DVT on AC  significant debility postop  PAF with VVI leadless pacer  s/p TAVR mod MR normal EF  ESRD on HD  HTN      Rec  cont low dose eliquis/plavix  consider lowering hydralazine 25 BID and procardia to once daily given low BPs this am  aggressive PT  ? need for LYNNE upon dc

## 2021-03-31 NOTE — PROGRESS NOTE ADULT - SUBJECTIVE AND OBJECTIVE BOX
[facility], THE [facility]                                   [address] [place] [address]                                                      PHONE: [phone]                                                         FAX: [phone]  [email or web address]  ---------------------------------------------------------------------------------------------------------------------------------    Overnight events/patient complaints: awake, alert, abit slow to respond, tele reviewed VVI pacing with underlying AF, BP meds resumed and BP abit low at times      Plavix (Hives)  Toprol-XL (Rash)    MEDICATIONS  (STANDING):  acetaminophen   Tablet .. 650 milliGRAM(s) Oral every 6 hours  apixaban 2.5 milliGRAM(s) Oral <User Schedule>  clopidogrel Tablet 75 milliGRAM(s) Oral daily  dextrose 40% Gel 15 Gram(s) Oral once  dextrose 5%. 1000 milliLiter(s) (50 mL/Hr) IV Continuous <Continuous>  dextrose 5%. 1000 milliLiter(s) (100 mL/Hr) IV Continuous <Continuous>  dextrose 50% Injectable 25 Gram(s) IV Push once  dextrose 50% Injectable 25 Gram(s) IV Push once  dextrose 50% Injectable 12.5 Gram(s) IV Push once  epoetin juan-epbx (RETACRIT) Injectable 78172 Unit(s) IV Push <User Schedule>  glucagon  Injectable 1 milliGRAM(s) IntraMuscular once  hydrALAZINE 50 milliGRAM(s) Oral two times a day  insulin lispro (ADMELOG) corrective regimen sliding scale   SubCutaneous three times a day before meals  NIFEdipine XL 60 milliGRAM(s) Oral at bedtime  NIFEdipine XL 90 milliGRAM(s) Oral daily  pantoprazole  Injectable 40 milliGRAM(s) IV Push every 12 hours  polyethylene glycol 3350 17 Gram(s) Oral daily  senna 2 Tablet(s) Oral at bedtime  tamsulosin 0.4 milliGRAM(s) Oral at bedtime  torsemide 20 milliGRAM(s) Oral daily    MEDICATIONS  (PRN):  artificial tears (preservative free) Ophthalmic Solution 1 Drop(s) Both EYES two times a day PRN Dry Eyes      Vital Signs Last 24 Hrs  T(C): 36.9 (31 Mar 2021 07:30), Max: 36.9 (31 Mar 2021 07:30)  T(F): 98.4 (31 Mar 2021 07:30), Max: 98.4 (31 Mar 2021 07:30)  HR: 51 (31 Mar 2021 07:30) (51 - 67)  BP: 107/49 (31 Mar 2021 09:40) (99/51 - 172/62)  BP(mean): --  RR: 18 (31 Mar 2021 07:30) (17 - 19)  SpO2: 98% (31 Mar 2021 07:30) (96% - 100%)  Daily     Daily Weight in k.1 (31 Mar 2021 04:31)  ICU Vital Signs Last 24 Hrs  CHRISTIANO GLENN  I&O's Detail    30 Mar 2021 07:01  -  31 Mar 2021 07:00  --------------------------------------------------------  IN:    Oral Fluid: 520 mL  Total IN: 520 mL    OUT:  Total OUT: 0 mL    Total NET: 520 mL        I&O's Summary    30 Mar 2021 07:01  -  31 Mar 2021 07:00  --------------------------------------------------------  IN: 520 mL / OUT: 0 mL / NET: 520 mL      Drug Dosing Weight  CHRISTIANO ELIZABETH      PHYSICAL EXAM:  General: Appears well developed, well nourished alert and cooperative.  HEENT: Head; normocephalic, atraumatic.  Eyes: Pupils reactive, cornea wnl.  Neck: Supple, no nodes adenopathy, no NVD or carotid bruit or thyromegaly.  CARDIOVASCULAR: systolic murmur at base  LUNGS: basilar rales  ABDOMEN: Soft, nontender without mass or organomegaly. bowel sounds normoactive.  EXTREMITIES: No clubbing, cyanosis or edema. Distal pulses wnl.   SKIN: warm and dry with normal turgor.  NEURO: Alert/oriented x 3/normal motor exam. No pathologic reflexes.    PSYCH: normal affect.        LABS:                        8.5    10.57 )-----------( 266      ( 31 Mar 2021 07:26 )             24.8           CHRISTIANO PROETTA      PTT - ( 30 Mar 2021 06:21 )  PTT:32.5 sec      RADIOLOGY & ADDITIONAL STUDIES:    INTERPRETATION OF TELEMETRY (personally reviewed): AF with VVI pacing    ECG:    ECHO:< from: TTE Echo Complete w/o Contrast w/ Doppler (21 @ 11:36) >    Summary:   1. Moderately enlarged left atrium.   2. There is mild concentric left ventricular hypertrophy.   3. Left ventricular ejection fraction, by visual estimation, is 60 to 65%.   4. Grade II diastolic dysfunction. Elevated mean left atrial pressure.   5. Mildly enlarged right atrium.   6. Mildly enlarged right ventricle. Mildly reduced RV systolic function.   7. Mild mitral stenosis. Moderate mitral valve regurgitation. Elevated mean gradient likely due to volume overload. Repeat study after diuresis to erevaluate mitral valve. If clinically indicated.   8. 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 Well seated valve. Acceptable gradients. No regurgitation.   9. Mild tricuspid regurgitation.  10. Estimated pulmonary artery systolic pressure is 78 mmHg - severe pulmonary hypertension.  11. There is no evidence of pericardial effusion.    MD Rohith, RPVI Electronically signed on 3/14/2021 at 6:02:57 PM            *** Final ***                < end of copied

## 2021-03-31 NOTE — PROGRESS NOTE ADULT - SUBJECTIVE AND OBJECTIVE BOX
Overnight events/patient complaints: awake, alert,  slow to respond, tele reviewed VVI pacing with underlying AF, BP meds resumed and BP a bit low at times    Plavix (Hives)  Toprol-XL (Rash)    MEDICATIONS  (STANDING):  acetaminophen   Tablet .. 650 milliGRAM(s) Oral every 6 hours  apixaban 2.5 milliGRAM(s) Oral <User Schedule>  clopidogrel Tablet 75 milliGRAM(s) Oral daily  dextrose 40% Gel 15 Gram(s) Oral once  dextrose 5%. 1000 milliLiter(s) (50 mL/Hr) IV Continuous <Continuous>  dextrose 5%. 1000 milliLiter(s) (100 mL/Hr) IV Continuous <Continuous>  dextrose 50% Injectable 25 Gram(s) IV Push once  dextrose 50% Injectable 25 Gram(s) IV Push once  dextrose 50% Injectable 12.5 Gram(s) IV Push once  epoetin juan-epbx (RETACRIT) Injectable 66869 Unit(s) IV Push <User Schedule>  glucagon  Injectable 1 milliGRAM(s) IntraMuscular once  hydrALAZINE 50 milliGRAM(s) Oral two times a day  insulin lispro (ADMELOG) corrective regimen sliding scale   SubCutaneous three times a day before meals  NIFEdipine XL 60 milliGRAM(s) Oral at bedtime  NIFEdipine XL 90 milliGRAM(s) Oral daily  pantoprazole  Injectable 40 milliGRAM(s) IV Push every 12 hours  polyethylene glycol 3350 17 Gram(s) Oral daily  senna 2 Tablet(s) Oral at bedtime  tamsulosin 0.4 milliGRAM(s) Oral at bedtime  torsemide 20 milliGRAM(s) Oral daily    MEDICATIONS  (PRN):  artificial tears (preservative free) Ophthalmic Solution 1 Drop(s) Both EYES two times a day PRN Dry Eyes    Vital Signs Last 24 Hrs  T(C): 36.9 (31 Mar 2021 07:30), Max: 36.9 (31 Mar 2021 07:30)  T(F): 98.4 (31 Mar 2021 07:30), Max: 98.4 (31 Mar 2021 07:30)  HR: 51 (31 Mar 2021 07:30) (51 - 67)  BP: 107/49 (31 Mar 2021 09:40) (99/51 - 172/62)  RR: 18 (31 Mar 2021 07:30) (17 - 19)  SpO2: 98% (31 Mar 2021 07:30) (96% - 100%)    Daily Weight in k.1 (31 Mar 2021 04:31)    I&O's Detail    30 Mar 2021 07:01  -  31 Mar 2021 07:00  --------------------------------------------------------  IN:    Oral Fluid: 520 mL  Total IN: 520 mL    OUT:  Total OUT: 0 mL    Total NET: 520 mL    I&O's Summary    30 Mar 2021 07:01  -  31 Mar 2021 07:00  --------------------------------------------------------  IN: 520 mL / OUT: 0 mL / NET: 520 mL    PHYSICAL EXAM:  General: Appears well developed, well nourished alert and cooperative.  HEENT: Head; normocephalic, atraumatic.  Eyes: Pupils reactive, cornea wnl.  Neck: Supple, no nodes adenopathy, no NVD or carotid bruit or thyromegaly.  CARDIOVASCULAR: systolic murmur at base  LUNGS: basilar rales  ABDOMEN: Soft, nontender without mass or organomegaly. bowel sounds normoactive.  EXTREMITIES: No clubbing, cyanosis or edema. Distal pulses wnl.   SKIN: warm and dry with normal turgor.  NEURO: Alert/oriented x 3/normal motor exam. No pathologic reflexes.    PSYCH: normal affect.        LABS:                        8.5    10.57 )-----------( 266      ( 31 Mar 2021 07:26 )             24.8     PTT - ( 30 Mar 2021 06:21 )  PTT:32.5 sec      RADIOLOGY & ADDITIONAL STUDIES:    INTERPRETATION OF TELEMETRY (personally reviewed): AF with VVI pacing    ECHO: TTE Echo Complete w/o Contrast w/ Doppler (21 @ 11:36)     Summary:   1. Moderately enlarged left atrium.   2. There is mild concentric left ventricular hypertrophy.   3. Left ventricular ejection fraction, by visual estimation, is 60 to 65%.   4. Grade II diastolic dysfunction. Elevated mean left atrial pressure.   5. Mildly enlarged right atrium.   6. Mildly enlarged right ventricle. Mildly reduced RV systolic function.   7. Mild mitral stenosis. Moderate mitral valve regurgitation. Elevated mean gradient likely due to volume overload. Repeat study after diuresis to erevaluate mitral valve. If clinically indicated.   8. 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 Well seated valve. Acceptable gradients. No regurgitation.   9. Mild tricuspid regurgitation.  10. Estimated pulmonary artery systolic pressure is 78 mmHg - severe pulmonary hypertension.  11. There is no evidence of pericardial effusion.    MD Rohith, RPVI Electronically signed on 3/14/2021 at 6:02:57 PM    Assessment:    s/p LLE bypass with revision  postop DVT on AC  significant debility postop  PAF with VVI leadless pacer  s/p TAVR mod MR normal EF  ESRD on HD  HTN    Rec  cont low dose eliquis/plavix  consider lowering hydralazine 25 BID and procardia to once daily given low BPs this am  aggressive PT  ? need for LYNNE upon dc

## 2021-04-01 LAB
ANION GAP SERPL CALC-SCNC: 14 MMOL/L — SIGNIFICANT CHANGE UP (ref 5–17)
ANION GAP SERPL CALC-SCNC: 15 MMOL/L — SIGNIFICANT CHANGE UP (ref 5–17)
BASOPHILS # BLD AUTO: 0.04 K/UL — SIGNIFICANT CHANGE UP (ref 0–0.2)
BASOPHILS NFR BLD AUTO: 0.3 % — SIGNIFICANT CHANGE UP (ref 0–2)
BUN SERPL-MCNC: 31 MG/DL — HIGH (ref 8–20)
BUN SERPL-MCNC: 62 MG/DL — HIGH (ref 8–20)
CALCIUM SERPL-MCNC: 8.4 MG/DL — LOW (ref 8.6–10.2)
CALCIUM SERPL-MCNC: 8.5 MG/DL — LOW (ref 8.6–10.2)
CHLORIDE SERPL-SCNC: 97 MMOL/L — LOW (ref 98–107)
CHLORIDE SERPL-SCNC: 97 MMOL/L — LOW (ref 98–107)
CO2 SERPL-SCNC: 25 MMOL/L — SIGNIFICANT CHANGE UP (ref 22–29)
CO2 SERPL-SCNC: 26 MMOL/L — SIGNIFICANT CHANGE UP (ref 22–29)
CREAT SERPL-MCNC: 2.71 MG/DL — HIGH (ref 0.5–1.3)
CREAT SERPL-MCNC: 5.37 MG/DL — HIGH (ref 0.5–1.3)
EOSINOPHIL # BLD AUTO: 0.05 K/UL — SIGNIFICANT CHANGE UP (ref 0–0.5)
EOSINOPHIL NFR BLD AUTO: 0.4 % — SIGNIFICANT CHANGE UP (ref 0–6)
GLUCOSE BLDC GLUCOMTR-MCNC: 102 MG/DL — HIGH (ref 70–99)
GLUCOSE BLDC GLUCOMTR-MCNC: 122 MG/DL — HIGH (ref 70–99)
GLUCOSE BLDC GLUCOMTR-MCNC: 134 MG/DL — HIGH (ref 70–99)
GLUCOSE BLDC GLUCOMTR-MCNC: 142 MG/DL — HIGH (ref 70–99)
GLUCOSE SERPL-MCNC: 129 MG/DL — HIGH (ref 70–99)
GLUCOSE SERPL-MCNC: 74 MG/DL — SIGNIFICANT CHANGE UP (ref 70–99)
HCT VFR BLD CALC: 24.8 % — LOW (ref 39–50)
HCT VFR BLD CALC: 28.7 % — LOW (ref 39–50)
HGB BLD-MCNC: 8.3 G/DL — LOW (ref 13–17)
HGB BLD-MCNC: 9.4 G/DL — LOW (ref 13–17)
IMM GRANULOCYTES NFR BLD AUTO: 2.8 % — HIGH (ref 0–1.5)
LACTATE SERPL-SCNC: 1.2 MMOL/L — SIGNIFICANT CHANGE UP (ref 0.5–2)
LYMPHOCYTES # BLD AUTO: 0.22 K/UL — LOW (ref 1–3.3)
LYMPHOCYTES # BLD AUTO: 1.7 % — LOW (ref 13–44)
MAGNESIUM SERPL-MCNC: 2.2 MG/DL — SIGNIFICANT CHANGE UP (ref 1.6–2.6)
MCHC RBC-ENTMCNC: 29.9 PG — SIGNIFICANT CHANGE UP (ref 27–34)
MCHC RBC-ENTMCNC: 30.2 PG — SIGNIFICANT CHANGE UP (ref 27–34)
MCHC RBC-ENTMCNC: 32.8 GM/DL — SIGNIFICANT CHANGE UP (ref 32–36)
MCHC RBC-ENTMCNC: 33.5 GM/DL — SIGNIFICANT CHANGE UP (ref 32–36)
MCV RBC AUTO: 90.2 FL — SIGNIFICANT CHANGE UP (ref 80–100)
MCV RBC AUTO: 91.4 FL — SIGNIFICANT CHANGE UP (ref 80–100)
MONOCYTES # BLD AUTO: 0.47 K/UL — SIGNIFICANT CHANGE UP (ref 0–0.9)
MONOCYTES NFR BLD AUTO: 3.6 % — SIGNIFICANT CHANGE UP (ref 2–14)
NEUTROPHILS # BLD AUTO: 11.87 K/UL — HIGH (ref 1.8–7.4)
NEUTROPHILS NFR BLD AUTO: 91.2 % — HIGH (ref 43–77)
PLATELET # BLD AUTO: 313 K/UL — SIGNIFICANT CHANGE UP (ref 150–400)
PLATELET # BLD AUTO: 358 K/UL — SIGNIFICANT CHANGE UP (ref 150–400)
POTASSIUM SERPL-MCNC: 3.7 MMOL/L — SIGNIFICANT CHANGE UP (ref 3.5–5.3)
POTASSIUM SERPL-MCNC: 4.6 MMOL/L — SIGNIFICANT CHANGE UP (ref 3.5–5.3)
POTASSIUM SERPL-SCNC: 3.7 MMOL/L — SIGNIFICANT CHANGE UP (ref 3.5–5.3)
POTASSIUM SERPL-SCNC: 4.6 MMOL/L — SIGNIFICANT CHANGE UP (ref 3.5–5.3)
RBC # BLD: 2.75 M/UL — LOW (ref 4.2–5.8)
RBC # BLD: 3.14 M/UL — LOW (ref 4.2–5.8)
RBC # FLD: 15.7 % — HIGH (ref 10.3–14.5)
RBC # FLD: 15.9 % — HIGH (ref 10.3–14.5)
SODIUM SERPL-SCNC: 136 MMOL/L — SIGNIFICANT CHANGE UP (ref 135–145)
SODIUM SERPL-SCNC: 138 MMOL/L — SIGNIFICANT CHANGE UP (ref 135–145)
WBC # BLD: 13.01 K/UL — HIGH (ref 3.8–10.5)
WBC # BLD: 13.54 K/UL — HIGH (ref 3.8–10.5)
WBC # FLD AUTO: 13.01 K/UL — HIGH (ref 3.8–10.5)
WBC # FLD AUTO: 13.54 K/UL — HIGH (ref 3.8–10.5)

## 2021-04-01 PROCEDURE — 90937 HEMODIALYSIS REPEATED EVAL: CPT

## 2021-04-01 PROCEDURE — 93010 ELECTROCARDIOGRAM REPORT: CPT

## 2021-04-01 RX ORDER — DILTIAZEM HCL 120 MG
30 CAPSULE, EXT RELEASE 24 HR ORAL ONCE
Refills: 0 | Status: COMPLETED | OUTPATIENT
Start: 2021-04-01 | End: 2021-04-01

## 2021-04-01 RX ADMIN — Medication 650 MILLIGRAM(S): at 23:31

## 2021-04-01 RX ADMIN — Medication 50 MILLIGRAM(S): at 18:16

## 2021-04-01 RX ADMIN — TRAMADOL HYDROCHLORIDE 25 MILLIGRAM(S): 50 TABLET ORAL at 23:43

## 2021-04-01 RX ADMIN — APIXABAN 2.5 MILLIGRAM(S): 2.5 TABLET, FILM COATED ORAL at 23:31

## 2021-04-01 RX ADMIN — DULOXETINE HYDROCHLORIDE 60 MILLIGRAM(S): 30 CAPSULE, DELAYED RELEASE ORAL at 18:20

## 2021-04-01 RX ADMIN — TAMSULOSIN HYDROCHLORIDE 0.4 MILLIGRAM(S): 0.4 CAPSULE ORAL at 23:31

## 2021-04-01 RX ADMIN — Medication 650 MILLIGRAM(S): at 18:16

## 2021-04-01 RX ADMIN — Medication 650 MILLIGRAM(S): at 00:26

## 2021-04-01 RX ADMIN — ERYTHROPOIETIN 10000 UNIT(S): 10000 INJECTION, SOLUTION INTRAVENOUS; SUBCUTANEOUS at 15:11

## 2021-04-01 RX ADMIN — PANTOPRAZOLE SODIUM 40 MILLIGRAM(S): 20 TABLET, DELAYED RELEASE ORAL at 18:17

## 2021-04-01 RX ADMIN — CLOPIDOGREL BISULFATE 75 MILLIGRAM(S): 75 TABLET, FILM COATED ORAL at 18:16

## 2021-04-01 RX ADMIN — Medication 650 MILLIGRAM(S): at 01:26

## 2021-04-01 NOTE — PROGRESS NOTE ADULT - SUBJECTIVE AND OBJECTIVE BOX
[facility] DIVISION OF KIDNEY DISEASES AND HYPERTENSION -- HEMODIALYSIS NOTE  --------------------------------------------------------------------------------  Chief Complaint: ESRD/Ongoing hemodialysis requirement    24 hour events/subjective:    PAST HISTORY  --------------------------------------------------------------------------------  No significant changes to PMH, PSH, FHx, SHx, unless otherwise noted    ALLERGIES & MEDICATIONS  --------------------------------------------------------------------------------  Allergies    Plavix (Hives)  Toprol-XL (Rash)    Standing Inpatient Medications  acetaminophen   Tablet .. 650 milliGRAM(s) Oral every 6 hours  apixaban 2.5 milliGRAM(s) Oral <User Schedule>  clopidogrel Tablet 75 milliGRAM(s) Oral daily  dextrose 40% Gel 15 Gram(s) Oral once  dextrose 5%. 1000 milliLiter(s) IV Continuous <Continuous>  dextrose 5%. 1000 milliLiter(s) IV Continuous <Continuous>  dextrose 50% Injectable 25 Gram(s) IV Push once  dextrose 50% Injectable 12.5 Gram(s) IV Push once  dextrose 50% Injectable 25 Gram(s) IV Push once  DULoxetine 60 milliGRAM(s) Oral daily  epoetin juan-epbx (RETACRIT) Injectable 09485 Unit(s) IV Push <User Schedule>  glucagon  Injectable 1 milliGRAM(s) IntraMuscular once  hydrALAZINE 50 milliGRAM(s) Oral two times a day  insulin lispro (ADMELOG) corrective regimen sliding scale   SubCutaneous three times a day before meals  NIFEdipine XL 60 milliGRAM(s) Oral at bedtime  NIFEdipine XL 90 milliGRAM(s) Oral daily  pantoprazole  Injectable 40 milliGRAM(s) IV Push every 12 hours  polyethylene glycol 3350 17 Gram(s) Oral daily  senna 2 Tablet(s) Oral at bedtime  tamsulosin 0.4 milliGRAM(s) Oral at bedtime  torsemide 20 milliGRAM(s) Oral daily    PRN Inpatient Medications  artificial tears (preservative free) Ophthalmic Solution 1 Drop(s) Both EYES two times a day PRN  traMADol 25 milliGRAM(s) Oral every 4 hours PRN    REVIEW OF SYSTEMS  --------------------------------------------------------------------------------  Gen: ++ weight changes, fatigue, No  fevers/chills, weakness  Skin: No rashes  Head/Eyes/Ears/Mouth: No headache; Normal hearing; Normal vision w/o blurriness; No sinus pain/discomfort, sore throat  Respiratory: No dyspnea, cough, wheezing, hemoptysis  CV: No chest pain, PND, orthopnea  GI: No abdominal pain, diarrhea, constipation, nausea, vomiting, melena, hematochezia  : No increased frequency, dysuria, hematuria, nocturia  MSK: No joint pain/swelling; no back pain; no edema  Neuro: No dizziness/lightheadedness, weakness, seizures, ++  numbness, tingling  Heme: No easy bruising or bleeding  Endo: No heat/cold intolerance  Psych: +  significant nervousness, anxiety, stress, depression    All other systems were reviewed and are negative, except as noted.    VITALS/PHYSICAL EXAM  --------------------------------------------------------------------------------  T(C): 36.3 (04-01-21 @ 10:00), Max: 36.8 (03-31-21 @ 21:50)  HR: 51 (04-01-21 @ 10:00) (51 - 64)  BP: 164/63 (04-01-21 @ 10:00) (117/56 - 164/63)  RR: 16 (04-01-21 @ 10:00) (16 - 18)  SpO2: 96% (04-01-21 @ 10:00) (95% - 97%)    03-31-21 @ 07:01  -  04-01-21 @ 07:00  --------------------------------------------------------  IN: 300 mL / OUT: 0 mL / NET: 300 mL    Physical Exam:  	Gen: NAD, Pale, ill-appearing  	HEENT: PERRL, supple neck, clear oropharynx  	Pulm: CTA B/L  	CV: RRR, S1S2; no rub  	Back: No spinal or CVA tenderness; no sacral edema  	Abd: +BS, soft, nontender/nondistended  	: No suprapubic tenderness  	UE: Warm, FROM, no clubbing, intact strength; no edema; no asterixis  	LE: Warm, FROM, no clubbing, intact strength; no edema  	Neuro: No focal deficits,  	Psych: Flat affect and mood  	Skin: Warm, without rashes  	Vascular access: AVF , S/P Surgery - LLE, Gangrene Toes,     LABS/STUDIES  --------------------------------------------------------------------------------              8.9    12.13 >-----------<  365      [04-01-21 @ 09:21]              26.4     136  |  97  |  62.0  ----------------------------<  74      [04-01-21 @ 02:41]  4.6   |  25.0  |  5.37        Ca     8.4     [04-01-21 @ 02:41]      Mg     2.2     [04-01-21 @ 02:41]    Iron 53, TIBC 266, %sat 20      [08-19-20 @ 06:32]  Ferritin 184      [08-19-20 @ 06:32]  PTH -- (Ca 9.6)      [08-19-20 @ 16:08]   91  Vitamin D (25OH) 26.4      [08-19-20 @ 16:08]  HbA1c 4.9      [08-09-18 @ 05:54]  TSH 2.16      [09-15-20 @ 02:01]

## 2021-04-01 NOTE — H&P ADULT - NSICDXPASTSURGICALHX_GEN_ALL_CORE_FT
[FreeTextEntry1] : Bell's palsy on the left resolved\par Mood d/o NOS, will refer to psychiatry PAST SURGICAL HISTORY:  A-V fistula left arm 5/2017    H/O angioplasty 2013,  no  intervention    H/O carotid endarterectomy Right    H/O circumcision at  age  65    H/O left knee surgery     S/P TAVR (transcatheter aortic valve replacement)

## 2021-04-01 NOTE — PROGRESS NOTE ADULT - ASSESSMENT
85 y/o M who presented with LLE critical limb ischemia s/p L CFA to  below the knee pop bypass with PTFE graft placed on 3/20 which required revision with fem AT bypass with cryovein on 3/25 . Patient currently clinically stable. AM dressing changed done. As per physical therapy and occupation therapy evaluation patient in need of LYNNE. Patient awaiting LYNNE placement.    Plan:   - Continue Plavix and Eliquis   - Discuss with  patient dispo for LYNNE placement   - Encourage out of bed

## 2021-04-01 NOTE — PROGRESS NOTE ADULT - SUBJECTIVE AND OBJECTIVE BOX
[facility], THE HEART CENTER                              [address] [address]                                                 PHONE: [phone]                                                 FAX: [phone]  -----------------------------------------------------------------------------------------------  Pt seen and examined. FU for PAD    Overnight events/Complaints: Pt getting PT at bedside. Family at bedside reports pt still having difficulty standing on feet. Pain controlled.     Vital Signs Last 24 Hrs  T(C): 36.8 (01 Apr 2021 06:26), Max: 36.8 (31 Mar 2021 21:50)  T(F): 98.2 (01 Apr 2021 06:26), Max: 98.2 (31 Mar 2021 21:50)  HR: 53 (01 Apr 2021 06:26) (53 - 64)  BP: 146/61 (01 Apr 2021 06:26) (117/56 - 146/61)  BP(mean): --  RR: 16 (01 Apr 2021 06:26) (16 - 18)  SpO2: 95% (01 Apr 2021 06:26) (95% - 97%)  I&O's Summary    31 Mar 2021 07:01  -  01 Apr 2021 07:00  --------------------------------------------------------  IN: 300 mL / OUT: 0 mL / NET: 300 mL      PHYSICAL EXAM:  Constitutional: Elderly male in bed  HEENT:     Head: Normocephalic and atraumatic  Neck: supple. No JVD  Cardiovascular: regular S1 S2  Respiratory: Lungs clear to auscultation; no crepitations, no wheeze  Gastrointestinal:  Soft, Non-tender, + BS	  Musculoskeletal: Normal range of motion. No edema  Skin: Warm and dry. No cyanosis . No diaphoresis.  Neurologic: Alert oriented to time place and person. Normal strength and no tremor.        LABS:                        8.9    12.13 )-----------( 365      ( 01 Apr 2021 09:21 )             26.4     04-01    136  |  97<L>  |  62.0<H>  ----------------------------<  74  4.6   |  25.0  |  5.37<H>    Ca    8.4<L>      01 Apr 2021 02:41  Mg     2.2     04-01    RADIOLOGY & ADDITIONAL STUDIES: (reviewed)  CXR was independently visualized/reviewed  and demonstrated: bilateral infiltrates    CARDIOLOGY TESTING:(reviewed)     12 lead EKG independently visualized/reviewed  and demonstrated paced rhythm    ECHOCARDIOGRAM independently visualized/reviewed and demonstrated :   Summary:   1. Moderately enlarged left atrium.   2. There is mild concentric left ventricular hypertrophy.   3. Left ventricular ejection fraction, by visual estimation, is 60 to 65%.   4. Grade II diastolic dysfunction. Elevated mean left atrial pressure.   5. Mildly enlarged right atrium.   6. Mildly enlarged right ventricle. Mildly reduced RV systolic function.   7. Mild mitral stenosis. Moderate mitral valve regurgitation. Elevated mean gradient likely due to volume overload. Repeat study after diuresis to erevaluate mitral valve. If clinically indicated.   8. 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 Well seated valve. Acceptable gradients. No regurgitation.   9. Mild tricuspid regurgitation.  10. Estimated pulmonary artery systolic pressure is 78 mmHg - severe pulmonary hypertension.  11. There is no evidence of pericardial effusion.    Catheterization:  RIGHT LOWER EXTREMITY VESSELS: Right superficial femoral: There was a 90 %  stenosis. Right peroneal: There was a 100 % stenosis.  COMPLICATIONS: No complications occurred during the cath lab visit.  Prepared and signed by  Siva Albrecht MD    TELEMETRY independently visualized/reviewed and demonstrated : paced [ 70-80 ]  with no arrhythmias;     MEDICATIONS:(reviewed)  MEDICATIONS  (STANDING):  acetaminophen   Tablet .. 650 milliGRAM(s) Oral every 6 hours  apixaban 2.5 milliGRAM(s) Oral <User Schedule>  clopidogrel Tablet 75 milliGRAM(s) Oral daily  dextrose 40% Gel 15 Gram(s) Oral once  dextrose 5%. 1000 milliLiter(s) (50 mL/Hr) IV Continuous <Continuous>  dextrose 5%. 1000 milliLiter(s) (100 mL/Hr) IV Continuous <Continuous>  dextrose 50% Injectable 25 Gram(s) IV Push once  dextrose 50% Injectable 12.5 Gram(s) IV Push once  dextrose 50% Injectable 25 Gram(s) IV Push once  DULoxetine 60 milliGRAM(s) Oral daily  epoetin juan-epbx (RETACRIT) Injectable 96655 Unit(s) IV Push <User Schedule>  glucagon  Injectable 1 milliGRAM(s) IntraMuscular once  hydrALAZINE 50 milliGRAM(s) Oral two times a day  insulin lispro (ADMELOG) corrective regimen sliding scale   SubCutaneous three times a day before meals  NIFEdipine XL 60 milliGRAM(s) Oral at bedtime  NIFEdipine XL 90 milliGRAM(s) Oral daily  pantoprazole  Injectable 40 milliGRAM(s) IV Push every 12 hours  polyethylene glycol 3350 17 Gram(s) Oral daily  senna 2 Tablet(s) Oral at bedtime  tamsulosin 0.4 milliGRAM(s) Oral at bedtime  torsemide 20 milliGRAM(s) Oral daily

## 2021-04-01 NOTE — PROGRESS NOTE ADULT - SUBJECTIVE AND OBJECTIVE BOX
Subjective/Overnight event: Evaluated at bedside found laying down in no distress AM. Patient with no new complaints. Tolerating diet. Voiding adequate volumes. Denies any fever/chills, nausea/vomiting, dizziness, SOB chest pain, constipation/diarrhea, weakness, numbness.       MEDICATIONS  (STANDING):  acetaminophen   Tablet .. 650 milliGRAM(s) Oral every 6 hours  apixaban 2.5 milliGRAM(s) Oral <User Schedule>  clopidogrel Tablet 75 milliGRAM(s) Oral daily  dextrose 40% Gel 15 Gram(s) Oral once  dextrose 5%. 1000 milliLiter(s) (50 mL/Hr) IV Continuous <Continuous>  dextrose 5%. 1000 milliLiter(s) (100 mL/Hr) IV Continuous <Continuous>  dextrose 50% Injectable 25 Gram(s) IV Push once  dextrose 50% Injectable 12.5 Gram(s) IV Push once  dextrose 50% Injectable 25 Gram(s) IV Push once  DULoxetine 60 milliGRAM(s) Oral daily  epoetin juan-epbx (RETACRIT) Injectable 84337 Unit(s) IV Push <User Schedule>  glucagon  Injectable 1 milliGRAM(s) IntraMuscular once  hydrALAZINE 50 milliGRAM(s) Oral two times a day  insulin lispro (ADMELOG) corrective regimen sliding scale   SubCutaneous three times a day before meals  NIFEdipine XL 60 milliGRAM(s) Oral at bedtime  NIFEdipine XL 90 milliGRAM(s) Oral daily  pantoprazole  Injectable 40 milliGRAM(s) IV Push every 12 hours  polyethylene glycol 3350 17 Gram(s) Oral daily  senna 2 Tablet(s) Oral at bedtime  tamsulosin 0.4 milliGRAM(s) Oral at bedtime  torsemide 20 milliGRAM(s) Oral daily    MEDICATIONS  (PRN):  artificial tears (preservative free) Ophthalmic Solution 1 Drop(s) Both EYES two times a day PRN Dry Eyes  traMADol 25 milliGRAM(s) Oral every 4 hours PRN Severe Pain (7 - 10)      Vital Signs:  Vital Signs Last 24 Hrs  T(C): 36.8 (01 Apr 2021 06:26), Max: 36.8 (31 Mar 2021 21:50)  T(F): 98.2 (01 Apr 2021 06:26), Max: 98.2 (31 Mar 2021 21:50)  HR: 53 (01 Apr 2021 06:26) (53 - 64)  BP: 146/61 (01 Apr 2021 06:26) (117/56 - 146/61)  BP(mean): --  RR: 16 (01 Apr 2021 06:26) (16 - 18)  SpO2: 95% (01 Apr 2021 06:26) (95% - 97%)    CAPILLARY BLOOD GLUCOSE      POCT Blood Glucose.: 102 mg/dL (01 Apr 2021 08:05)  POCT Blood Glucose.: 113 mg/dL (31 Mar 2021 21:57)  POCT Blood Glucose.: 197 mg/dL (31 Mar 2021 16:52)  POCT Blood Glucose.: 235 mg/dL (31 Mar 2021 13:10)  POCT Blood Glucose.: 252 mg/dL (31 Mar 2021 11:56)      I&O's Detail    31 Mar 2021 07:01  -  01 Apr 2021 07:00  --------------------------------------------------------  IN:    Oral Fluid: 300 mL  Total IN: 300 mL    OUT:  Total OUT: 0 mL    Total NET: 300 mL          Physical Examination:  General: alert, oriented x 3 in no acute distress   CV: normal S1/S2, RRR, no gallops, murmurs or rubs   Lungs: clear to auscultation b/l, symmetric chest movement, no rales, rhonchi or wheezing   Abdomen: soft, depressible, non-tender, non-distended, +BS  EXT:            RLE: full ROM,  sensation intact to pin and prick, pulses palpable +2 DP           LLE: medial, lateral and inguinal surgical incision sites with staples in place. Healing well with no surrounding erythema, edema or purulent suppuration, tender to palpation, no skin changes    Labs:    04-01    136  |  97<L>  |  62.0<H>  ----------------------------<  74  4.6   |  25.0  |  5.37<H>    Ca    8.4<L>      01 Apr 2021 02:41  Mg     2.2     04-01                              8.9    12.13 )-----------( 365      ( 01 Apr 2021 09:21 )             26.4

## 2021-04-01 NOTE — PROGRESS NOTE ADULT - ASSESSMENT
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

## 2021-04-01 NOTE — PROGRESS NOTE ADULT - ASSESSMENT
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

## 2021-04-02 ENCOUNTER — OUTPATIENT (OUTPATIENT)
Dept: OUTPATIENT SERVICES | Facility: HOSPITAL | Age: 86
LOS: 1 days | Discharge: HOME | End: 2021-04-02

## 2021-04-02 DIAGNOSIS — Z98.62 PERIPHERAL VASCULAR ANGIOPLASTY STATUS: Chronic | ICD-10-CM

## 2021-04-02 DIAGNOSIS — I77.0 ARTERIOVENOUS FISTULA, ACQUIRED: Chronic | ICD-10-CM

## 2021-04-02 DIAGNOSIS — Z98.890 OTHER SPECIFIED POSTPROCEDURAL STATES: Chronic | ICD-10-CM

## 2021-04-02 DIAGNOSIS — Z95.2 PRESENCE OF PROSTHETIC HEART VALVE: Chronic | ICD-10-CM

## 2021-04-02 DIAGNOSIS — D63.1 ANEMIA IN CHRONIC KIDNEY DISEASE: ICD-10-CM

## 2021-04-02 LAB
ANION GAP SERPL CALC-SCNC: 13 MMOL/L — SIGNIFICANT CHANGE UP (ref 5–17)
BASOPHILS # BLD AUTO: 0.04 K/UL — SIGNIFICANT CHANGE UP (ref 0–0.2)
BASOPHILS NFR BLD AUTO: 0.3 % — SIGNIFICANT CHANGE UP (ref 0–2)
BUN SERPL-MCNC: 38 MG/DL — HIGH (ref 8–20)
CALCIUM SERPL-MCNC: 8.1 MG/DL — LOW (ref 8.6–10.2)
CHLORIDE SERPL-SCNC: 98 MMOL/L — SIGNIFICANT CHANGE UP (ref 98–107)
CO2 SERPL-SCNC: 27 MMOL/L — SIGNIFICANT CHANGE UP (ref 22–29)
CREAT SERPL-MCNC: 3.42 MG/DL — HIGH (ref 0.5–1.3)
EOSINOPHIL # BLD AUTO: 0.03 K/UL — SIGNIFICANT CHANGE UP (ref 0–0.5)
EOSINOPHIL NFR BLD AUTO: 0.3 % — SIGNIFICANT CHANGE UP (ref 0–6)
GLUCOSE BLDC GLUCOMTR-MCNC: 120 MG/DL — HIGH (ref 70–99)
GLUCOSE BLDC GLUCOMTR-MCNC: 130 MG/DL — HIGH (ref 70–99)
GLUCOSE BLDC GLUCOMTR-MCNC: 139 MG/DL — HIGH (ref 70–99)
GLUCOSE BLDC GLUCOMTR-MCNC: 147 MG/DL — HIGH (ref 70–99)
GLUCOSE SERPL-MCNC: 123 MG/DL — HIGH (ref 70–99)
HCT VFR BLD CALC: 26.5 % — LOW (ref 39–50)
HGB BLD-MCNC: 8.6 G/DL — LOW (ref 13–17)
IMM GRANULOCYTES NFR BLD AUTO: 2.9 % — HIGH (ref 0–1.5)
LYMPHOCYTES # BLD AUTO: 0.39 K/UL — LOW (ref 1–3.3)
LYMPHOCYTES # BLD AUTO: 3.4 % — LOW (ref 13–44)
MAGNESIUM SERPL-MCNC: 2.1 MG/DL — SIGNIFICANT CHANGE UP (ref 1.6–2.6)
MCHC RBC-ENTMCNC: 29.7 PG — SIGNIFICANT CHANGE UP (ref 27–34)
MCHC RBC-ENTMCNC: 32.5 GM/DL — SIGNIFICANT CHANGE UP (ref 32–36)
MCV RBC AUTO: 91.4 FL — SIGNIFICANT CHANGE UP (ref 80–100)
MONOCYTES # BLD AUTO: 0.53 K/UL — SIGNIFICANT CHANGE UP (ref 0–0.9)
MONOCYTES NFR BLD AUTO: 4.6 % — SIGNIFICANT CHANGE UP (ref 2–14)
NEUTROPHILS # BLD AUTO: 10.21 K/UL — HIGH (ref 1.8–7.4)
NEUTROPHILS NFR BLD AUTO: 88.5 % — HIGH (ref 43–77)
PHOSPHATE SERPL-MCNC: 2.8 MG/DL — SIGNIFICANT CHANGE UP (ref 2.4–4.7)
PLATELET # BLD AUTO: 394 K/UL — SIGNIFICANT CHANGE UP (ref 150–400)
POTASSIUM SERPL-MCNC: 3.6 MMOL/L — SIGNIFICANT CHANGE UP (ref 3.5–5.3)
POTASSIUM SERPL-SCNC: 3.6 MMOL/L — SIGNIFICANT CHANGE UP (ref 3.5–5.3)
RBC # BLD: 2.9 M/UL — LOW (ref 4.2–5.8)
RBC # FLD: 15.9 % — HIGH (ref 10.3–14.5)
SODIUM SERPL-SCNC: 138 MMOL/L — SIGNIFICANT CHANGE UP (ref 135–145)
WBC # BLD: 11.54 K/UL — HIGH (ref 3.8–10.5)
WBC # FLD AUTO: 11.54 K/UL — HIGH (ref 3.8–10.5)

## 2021-04-02 PROCEDURE — 99233 SBSQ HOSP IP/OBS HIGH 50: CPT

## 2021-04-02 RX ORDER — DILTIAZEM HCL 120 MG
240 CAPSULE, EXT RELEASE 24 HR ORAL DAILY
Refills: 0 | Status: DISCONTINUED | OUTPATIENT
Start: 2021-04-02 | End: 2021-04-08

## 2021-04-02 RX ORDER — HYDRALAZINE HCL 50 MG
25 TABLET ORAL
Refills: 0 | Status: DISCONTINUED | OUTPATIENT
Start: 2021-04-02 | End: 2021-04-08

## 2021-04-02 RX ADMIN — Medication 50 MILLIGRAM(S): at 06:09

## 2021-04-02 RX ADMIN — Medication 650 MILLIGRAM(S): at 06:09

## 2021-04-02 RX ADMIN — Medication 25 MILLIGRAM(S): at 13:56

## 2021-04-02 RX ADMIN — DULOXETINE HYDROCHLORIDE 60 MILLIGRAM(S): 30 CAPSULE, DELAYED RELEASE ORAL at 13:24

## 2021-04-02 RX ADMIN — Medication 20 MILLIGRAM(S): at 06:09

## 2021-04-02 RX ADMIN — Medication 650 MILLIGRAM(S): at 18:25

## 2021-04-02 RX ADMIN — PANTOPRAZOLE SODIUM 40 MILLIGRAM(S): 20 TABLET, DELAYED RELEASE ORAL at 18:25

## 2021-04-02 RX ADMIN — TAMSULOSIN HYDROCHLORIDE 0.4 MILLIGRAM(S): 0.4 CAPSULE ORAL at 22:49

## 2021-04-02 RX ADMIN — POLYETHYLENE GLYCOL 3350 17 GRAM(S): 17 POWDER, FOR SOLUTION ORAL at 13:37

## 2021-04-02 RX ADMIN — Medication 650 MILLIGRAM(S): at 01:44

## 2021-04-02 RX ADMIN — PANTOPRAZOLE SODIUM 40 MILLIGRAM(S): 20 TABLET, DELAYED RELEASE ORAL at 06:09

## 2021-04-02 RX ADMIN — Medication 650 MILLIGRAM(S): at 13:17

## 2021-04-02 RX ADMIN — APIXABAN 2.5 MILLIGRAM(S): 2.5 TABLET, FILM COATED ORAL at 13:20

## 2021-04-02 RX ADMIN — SENNA PLUS 2 TABLET(S): 8.6 TABLET ORAL at 22:49

## 2021-04-02 RX ADMIN — CLOPIDOGREL BISULFATE 75 MILLIGRAM(S): 75 TABLET, FILM COATED ORAL at 13:19

## 2021-04-02 NOTE — PROGRESS NOTE ADULT - ASSESSMENT
1) ESRD on HD  2) MBD of renal dx  3) Anemia of renal dx  4) Vol HTN    HD BIW   Continue with retacrit 10k units       dw surgery team

## 2021-04-02 NOTE — PROGRESS NOTE ADULT - ASSESSMENT
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  Episode of ? PAF/sinus tachy post HD. Now improved    cont low dose eliquis/plavix  Will change Nifedipine to cardizem  Decrease hydralazine to 25 mg BID as relative hypotension likely contributed to tachycardia  Torsemide resumed

## 2021-04-02 NOTE — PROGRESS NOTE ADULT - ASSESSMENT
85 y/o M who presented with LLE critical limb ischemia s/p L CFA to  below the knee pop bypass with PTFE graft placed on 3/20 which required revision with fem AT bypass with cryovein on 3/25 . Patient currently clinically stable. AM dressing changed done. As per physical therapy and occupation therapy evaluation patient in need of LYNNE. Patient awaiting LYNNE placement.    Plan:   - Continue Plavix and Eliquis   - Discuss with  patient dispo for LYNNE placement   - Encourage out of bed   - will continue to follow up cardiology recommendations regarding pacemaker and HR.

## 2021-04-02 NOTE — PROGRESS NOTE ADULT - SUBJECTIVE AND OBJECTIVE BOX
[facility]                                      [facility]                              [address] [place] [address]                                                 PHONE: [phone]                                                 FAX: [phone]  -----------------------------------------------------------------------------------------------  Pt seen and examined. FU for PAD    Overnight events/Complaints: Pt comfortable in bed. Episode of tachy ? AF yesterday after HD. Now HR better.    Vital Signs Last 24 Hrs  T(C): 36.8 (02 Apr 2021 05:38), Max: 38.2 (01 Apr 2021 19:58)  T(F): 98.2 (02 Apr 2021 05:38), Max: 100.7 (01 Apr 2021 19:58)  HR: 57 (02 Apr 2021 05:38) (57 - 117)  BP: 166/67 (02 Apr 2021 05:38) (148/70 - 166/67)  BP(mean): --  RR: 18 (02 Apr 2021 05:38) (17 - 20)  SpO2: 95% (02 Apr 2021 05:38) (95% - 99%)  I&O's Summary    01 Apr 2021 07:01  -  02 Apr 2021 07:00  --------------------------------------------------------  IN: 60 mL / OUT: 900 mL / NET: -840 mL      PHYSICAL EXAM:  Constitutional: Elderly male in bed  HEENT:     Head: Normocephalic and atraumatic  Neck: supple. No JVD  Cardiovascular: regular S1 S2  Respiratory: Lungs clear to auscultation; no crepitations, no wheeze  Gastrointestinal:  Soft, Non-tender, + BS	  Musculoskeletal: Normal range of motion. No edema  Skin: Warm and dry. No cyanosis . No diaphoresis.  Neurologic: Alert oriented to time place and person. Normal strength and no tremor.        LABS:                        8.6    11.54 )-----------( 394      ( 02 Apr 2021 06:32 )             26.5     04-02    138  |  98  |  38.0<H>  ----------------------------<  123<H>  3.6   |  27.0  |  3.42<H>    Ca    8.1<L>      02 Apr 2021 06:32  Phos  2.8     04-02  Mg     2.1     04-02    RADIOLOGY & ADDITIONAL STUDIES: (reviewed)  CXR was independently visualized/reviewed  and demonstrated: bilateral infiltrates    CARDIOLOGY TESTING:(reviewed)     12 lead EKG independently visualized/reviewed  and demonstrated paced rhythm    ECHOCARDIOGRAM independently visualized/reviewed and demonstrated :   Summary:   1. Moderately enlarged left atrium.   2. There is mild concentric left ventricular hypertrophy.   3. Left ventricular ejection fraction, by visual estimation, is 60 to 65%.   4. Grade II diastolic dysfunction. Elevated mean left atrial pressure.   5. Mildly enlarged right atrium.   6. Mildly enlarged right ventricle. Mildly reduced RV systolic function.   7. Mild mitral stenosis. Moderate mitral valve regurgitation. Elevated mean gradient likely due to volume overload. Repeat study after diuresis to erevaluate mitral valve. If clinically indicated.   8. 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 Well seated valve. Acceptable gradients. No regurgitation.   9. Mild tricuspid regurgitation.  10. Estimated pulmonary artery systolic pressure is 78 mmHg - severe pulmonary hypertension.  11. There is no evidence of pericardial effusion.    Catheterization:  RIGHT LOWER EXTREMITY VESSELS: Right superficial femoral: There was a 90 %  stenosis. Right peroneal: There was a 100 % stenosis.  COMPLICATIONS: No complications occurred during the cath lab visit.  Prepared and signed by  Siva Albrecht MD    TELEMETRY independently visualized/reviewed and demonstrated : ? AF/ sinus tachy with paced rhythm post HD.    MEDICATIONS:(reviewed)  MEDICATIONS  (STANDING):  acetaminophen   Tablet .. 650 milliGRAM(s) Oral every 6 hours  apixaban 2.5 milliGRAM(s) Oral <User Schedule>  clopidogrel Tablet 75 milliGRAM(s) Oral daily  dextrose 40% Gel 15 Gram(s) Oral once  dextrose 5%. 1000 milliLiter(s) (100 mL/Hr) IV Continuous <Continuous>  dextrose 5%. 1000 milliLiter(s) (50 mL/Hr) IV Continuous <Continuous>  dextrose 50% Injectable 25 Gram(s) IV Push once  dextrose 50% Injectable 12.5 Gram(s) IV Push once  dextrose 50% Injectable 25 Gram(s) IV Push once  DULoxetine 60 milliGRAM(s) Oral daily  epoetin juan-epbx (RETACRIT) Injectable 29861 Unit(s) IV Push <User Schedule>  glucagon  Injectable 1 milliGRAM(s) IntraMuscular once  hydrALAZINE 50 milliGRAM(s) Oral two times a day  insulin lispro (ADMELOG) corrective regimen sliding scale   SubCutaneous three times a day before meals  NIFEdipine XL 60 milliGRAM(s) Oral at bedtime  NIFEdipine XL 90 milliGRAM(s) Oral daily  pantoprazole  Injectable 40 milliGRAM(s) IV Push every 12 hours  polyethylene glycol 3350 17 Gram(s) Oral daily  senna 2 Tablet(s) Oral at bedtime  tamsulosin 0.4 milliGRAM(s) Oral at bedtime  torsemide 20 milliGRAM(s) Oral daily

## 2021-04-02 NOTE — PROGRESS NOTE ADULT - SUBJECTIVE AND OBJECTIVE BOX
[facility] DIVISION OF KIDNEY DISEASES AND HYPERTENSION -- FOLLOW UP NOTE  --------------------------------------------------------------------------------  Chief Complaint:  ESRD HD  24 hour events/subjective:  Seen/examined this AM  HD BIW      PAST HISTORY  --------------------------------------------------------------------------------  No significant changes to PMH, PSH, FHx, SHx, unless otherwise noted    ALLERGIES & MEDICATIONS  --------------------------------------------------------------------------------  Allergies    Plavix (Hives)  Toprol-XL (Rash)    Intolerances      Standing Inpatient Medications  acetaminophen   Tablet .. 650 milliGRAM(s) Oral every 6 hours  apixaban 2.5 milliGRAM(s) Oral <User Schedule>  clopidogrel Tablet 75 milliGRAM(s) Oral daily  dextrose 40% Gel 15 Gram(s) Oral once  dextrose 5%. 1000 milliLiter(s) IV Continuous <Continuous>  dextrose 5%. 1000 milliLiter(s) IV Continuous <Continuous>  dextrose 50% Injectable 25 Gram(s) IV Push once  dextrose 50% Injectable 12.5 Gram(s) IV Push once  dextrose 50% Injectable 25 Gram(s) IV Push once  diltiazem    milliGRAM(s) Oral daily  DULoxetine 60 milliGRAM(s) Oral daily  epoetin juan-epbx (RETACRIT) Injectable 36795 Unit(s) IV Push <User Schedule>  glucagon  Injectable 1 milliGRAM(s) IntraMuscular once  hydrALAZINE 50 milliGRAM(s) Oral two times a day  insulin lispro (ADMELOG) corrective regimen sliding scale   SubCutaneous three times a day before meals  pantoprazole  Injectable 40 milliGRAM(s) IV Push every 12 hours  polyethylene glycol 3350 17 Gram(s) Oral daily  senna 2 Tablet(s) Oral at bedtime  tamsulosin 0.4 milliGRAM(s) Oral at bedtime  torsemide 20 milliGRAM(s) Oral daily    PRN Inpatient Medications  artificial tears (preservative free) Ophthalmic Solution 1 Drop(s) Both EYES two times a day PRN  traMADol 25 milliGRAM(s) Oral every 4 hours PRN      REVIEW OF SYSTEMS  --------------------------------------------------------------------------------  Gen: ++ weight changes, fatigue, No  fevers/chills, weakness  Skin: No rashes  Head/Eyes/Ears/Mouth: No headache; Normal hearing; Normal vision w/o blurriness; No sinus pain/discomfort, sore throat  Respiratory: No dyspnea, cough, wheezing, hemoptysis  CV: No chest pain, PND, orthopnea  GI: No abdominal pain, diarrhea, constipation, nausea, vomiting, melena, hematochezia  : No increased frequency, dysuria, hematuria, nocturia  MSK: No joint pain/swelling; no back pain; no edema  Neuro: No dizziness/lightheadedness, weakness, seizures, ++  numbness, tingling  Heme: No easy bruising or bleeding  Endo: No heat/cold intolerance  Psych: +  significant nervousness, anxiety, stress, depression    All other systems were reviewed and are negative, except as noted.    VITALS/PHYSICAL EXAM  --------------------------------------------------------------------------------  T(C): 36.8 (04-02-21 @ 11:32), Max: 38.2 (04-01-21 @ 19:58)  HR: 65 (04-02-21 @ 11:32) (57 - 117)  BP: 175/76 (04-02-21 @ 11:32) (148/70 - 175/76)  RR: 17 (04-02-21 @ 11:32) (17 - 20)  SpO2: 95% (04-02-21 @ 11:32) (95% - 99%)  Wt(kg): --        04-01-21 @ 07:01  -  04-02-21 @ 07:00  --------------------------------------------------------  IN: 60 mL / OUT: 900 mL / NET: -840 mL      Physical Exam:  	Gen: NAD, Pale, ill-appearing  	HEENT: PERRL, supple neck, clear oropharynx  	Pulm: CTA B/L  	CV: RRR, S1S2; no rub  	Back: No spinal or CVA tenderness; no sacral edema  	Abd: +BS, soft, nontender/nondistended  	: No suprapubic tenderness  	UE: Warm, FROM, no clubbing, intact strength; no edema; no asterixis  	LE: Warm, FROM, no clubbing, intact strength; no edema  	Neuro: No focal deficits,  	Psych: Flat affect and mood  	Skin: Warm, without rashes  	Vascular access: AVF , S/P Surgery - LLE, Gangrene Toes,     LABS/STUDIES  --------------------------------------------------------------------------------              8.6    11.54 >-----------<  394      [04-02-21 @ 06:32]              26.5     138  |  98  |  38.0  ----------------------------<  123      [04-02-21 @ 06:32]  3.6   |  27.0  |  3.42        Ca     8.1     [04-02-21 @ 06:32]      Mg     2.1     [04-02-21 @ 06:32]      Phos  2.8     [04-02-21 @ 06:32]            Creatinine Trend:  SCr 3.42 [04-02 @ 06:32]  SCr 2.71 [04-01 @ 17:19]  SCr 5.37 [04-01 @ 02:41]  SCr 5.39 [03-29 @ 06:50]  SCr 4.48 [03-28 @ 05:00]    Urinalysis - [03-22-21 @ 22:41]      Color Yellow / Appearance Clear / SG 1.020 / pH 5.0      Gluc 50 / Ketone Trace  / Bili Small / Urobili Negative       Blood Moderate / Protein 100 / Leuk Est Trace / Nitrite Negative      RBC 0-2 / WBC 0-2 / Hyaline  / Gran  / Sq Epi  / Non Sq Epi Occasional / Bacteria       Iron 53, TIBC 266, %sat 20      [08-19-20 @ 06:32]  Ferritin 184      [08-19-20 @ 06:32]  PTH -- (Ca 9.6)      [08-19-20 @ 16:08]   91  Vitamin D (25OH) 26.4      [08-19-20 @ 16:08]  HbA1c 4.9      [08-09-18 @ 05:54]  TSH 2.16      [09-15-20 @ 02:01]

## 2021-04-02 NOTE — PROGRESS NOTE ADULT - SUBJECTIVE AND OBJECTIVE BOX
HPI/OVERNIGHT EVENTS: episode of tachycardia yesterday after HD. Cards eval and 2x diltiazem given with control of HR and now patient back to baseline shira. Cards continuing to follow. otherwise, no events. patient OOB to chair yesterday. improving peripheral edema. Low grade fever overnight 100.7 now resolved    MEDICATIONS  (STANDING):  acetaminophen   Tablet .. 650 milliGRAM(s) Oral every 6 hours  apixaban 2.5 milliGRAM(s) Oral <User Schedule>  clopidogrel Tablet 75 milliGRAM(s) Oral daily  dextrose 40% Gel 15 Gram(s) Oral once  dextrose 5%. 1000 milliLiter(s) (50 mL/Hr) IV Continuous <Continuous>  dextrose 5%. 1000 milliLiter(s) (100 mL/Hr) IV Continuous <Continuous>  dextrose 50% Injectable 25 Gram(s) IV Push once  dextrose 50% Injectable 12.5 Gram(s) IV Push once  dextrose 50% Injectable 25 Gram(s) IV Push once  DULoxetine 60 milliGRAM(s) Oral daily  epoetin juan-epbx (RETACRIT) Injectable 78754 Unit(s) IV Push <User Schedule>  glucagon  Injectable 1 milliGRAM(s) IntraMuscular once  hydrALAZINE 50 milliGRAM(s) Oral two times a day  insulin lispro (ADMELOG) corrective regimen sliding scale   SubCutaneous three times a day before meals  NIFEdipine XL 60 milliGRAM(s) Oral at bedtime  NIFEdipine XL 90 milliGRAM(s) Oral daily  pantoprazole  Injectable 40 milliGRAM(s) IV Push every 12 hours  polyethylene glycol 3350 17 Gram(s) Oral daily  senna 2 Tablet(s) Oral at bedtime  tamsulosin 0.4 milliGRAM(s) Oral at bedtime  torsemide 20 milliGRAM(s) Oral daily    MEDICATIONS  (PRN):  artificial tears (preservative free) Ophthalmic Solution 1 Drop(s) Both EYES two times a day PRN Dry Eyes  traMADol 25 milliGRAM(s) Oral every 4 hours PRN Severe Pain (7 - 10)      Vital Signs Last 24 Hrs  T(C): 36.8 (02 Apr 2021 05:38), Max: 38.2 (01 Apr 2021 19:58)  T(F): 98.2 (02 Apr 2021 05:38), Max: 100.7 (01 Apr 2021 19:58)  HR: 57 (02 Apr 2021 05:38) (51 - 117)  BP: 166/67 (02 Apr 2021 05:38) (148/70 - 166/67)  BP(mean): --  RR: 18 (02 Apr 2021 05:38) (16 - 20)  SpO2: 95% (02 Apr 2021 05:38) (95% - 99%)    Physical Examination:  General: alert, oriented x 3 in no acute distress   CV: normal S1/S2, RRR, no gallops, murmurs or rubs   Lungs: clear to auscultation b/l, symmetric chest movement, no rales, rhonchi or wheezing   Abdomen: soft, depressible, non-tender, non-distended, +BS  EXT:            RLE: full ROM,  sensation intact to pin and prick, pulses palpable +2 DP           LLE: medial, lateral and inguinal surgical incision sites with staples in place. Healing well with no surrounding erythema, edema or purulent suppuration, tender to palpation, no skin changes        I&O's Detail    01 Apr 2021 07:01  -  02 Apr 2021 07:00  --------------------------------------------------------  IN:    Oral Fluid: 60 mL  Total IN: 60 mL    OUT:    Other (mL): 400 mL    Post-Void Residual per Intermittent Catheterization (mL): 500 mL  Total OUT: 900 mL    Total NET: -840 mL          LABS:                        8.6    11.54 )-----------( 394      ( 02 Apr 2021 06:32 )             26.5     04-02    138  |  98  |  38.0<H>  ----------------------------<  123<H>  3.6   |  27.0  |  3.42<H>    Ca    8.1<L>      02 Apr 2021 06:32  Phos  2.8     04-02  Mg     2.1     04-02

## 2021-04-03 DIAGNOSIS — K62.5 HEMORRHAGE OF ANUS AND RECTUM: ICD-10-CM

## 2021-04-03 LAB
ANION GAP SERPL CALC-SCNC: 13 MMOL/L — SIGNIFICANT CHANGE UP (ref 5–17)
BASOPHILS # BLD AUTO: 0.03 K/UL — SIGNIFICANT CHANGE UP (ref 0–0.2)
BASOPHILS NFR BLD AUTO: 0.3 % — SIGNIFICANT CHANGE UP (ref 0–2)
BUN SERPL-MCNC: 50 MG/DL — HIGH (ref 8–20)
CALCIUM SERPL-MCNC: 8.1 MG/DL — LOW (ref 8.6–10.2)
CHLORIDE SERPL-SCNC: 98 MMOL/L — SIGNIFICANT CHANGE UP (ref 98–107)
CO2 SERPL-SCNC: 27 MMOL/L — SIGNIFICANT CHANGE UP (ref 22–29)
CREAT SERPL-MCNC: 4.24 MG/DL — HIGH (ref 0.5–1.3)
EOSINOPHIL # BLD AUTO: 0.05 K/UL — SIGNIFICANT CHANGE UP (ref 0–0.5)
EOSINOPHIL NFR BLD AUTO: 0.5 % — SIGNIFICANT CHANGE UP (ref 0–6)
GLUCOSE BLDC GLUCOMTR-MCNC: 122 MG/DL — HIGH (ref 70–99)
GLUCOSE BLDC GLUCOMTR-MCNC: 141 MG/DL — HIGH (ref 70–99)
GLUCOSE BLDC GLUCOMTR-MCNC: 229 MG/DL — HIGH (ref 70–99)
GLUCOSE SERPL-MCNC: 119 MG/DL — HIGH (ref 70–99)
HCT VFR BLD CALC: 25.4 % — LOW (ref 39–50)
HCT VFR BLD CALC: 26.4 % — LOW (ref 39–50)
HCT VFR BLD CALC: 27.6 % — LOW (ref 39–50)
HGB BLD-MCNC: 8.3 G/DL — LOW (ref 13–17)
HGB BLD-MCNC: 8.6 G/DL — LOW (ref 13–17)
HGB BLD-MCNC: 8.9 G/DL — LOW (ref 13–17)
IMM GRANULOCYTES NFR BLD AUTO: 1.9 % — HIGH (ref 0–1.5)
LYMPHOCYTES # BLD AUTO: 0.45 K/UL — LOW (ref 1–3.3)
LYMPHOCYTES # BLD AUTO: 4.4 % — LOW (ref 13–44)
MAGNESIUM SERPL-MCNC: 2.2 MG/DL — SIGNIFICANT CHANGE UP (ref 1.6–2.6)
MCHC RBC-ENTMCNC: 29.6 PG — SIGNIFICANT CHANGE UP (ref 27–34)
MCHC RBC-ENTMCNC: 29.7 PG — SIGNIFICANT CHANGE UP (ref 27–34)
MCHC RBC-ENTMCNC: 30.4 PG — SIGNIFICANT CHANGE UP (ref 27–34)
MCHC RBC-ENTMCNC: 32.2 GM/DL — SIGNIFICANT CHANGE UP (ref 32–36)
MCHC RBC-ENTMCNC: 32.6 GM/DL — SIGNIFICANT CHANGE UP (ref 32–36)
MCHC RBC-ENTMCNC: 32.7 GM/DL — SIGNIFICANT CHANGE UP (ref 32–36)
MCV RBC AUTO: 90.7 FL — SIGNIFICANT CHANGE UP (ref 80–100)
MCV RBC AUTO: 91 FL — SIGNIFICANT CHANGE UP (ref 80–100)
MCV RBC AUTO: 94.2 FL — SIGNIFICANT CHANGE UP (ref 80–100)
MONOCYTES # BLD AUTO: 0.53 K/UL — SIGNIFICANT CHANGE UP (ref 0–0.9)
MONOCYTES NFR BLD AUTO: 5.2 % — SIGNIFICANT CHANGE UP (ref 2–14)
NEUTROPHILS # BLD AUTO: 8.94 K/UL — HIGH (ref 1.8–7.4)
NEUTROPHILS NFR BLD AUTO: 87.7 % — HIGH (ref 43–77)
PHOSPHATE SERPL-MCNC: 3.3 MG/DL — SIGNIFICANT CHANGE UP (ref 2.4–4.7)
PLATELET # BLD AUTO: 430 K/UL — HIGH (ref 150–400)
PLATELET # BLD AUTO: 432 K/UL — HIGH (ref 150–400)
PLATELET # BLD AUTO: 438 K/UL — HIGH (ref 150–400)
POTASSIUM SERPL-MCNC: 3.9 MMOL/L — SIGNIFICANT CHANGE UP (ref 3.5–5.3)
POTASSIUM SERPL-SCNC: 3.9 MMOL/L — SIGNIFICANT CHANGE UP (ref 3.5–5.3)
RBC # BLD: 2.79 M/UL — LOW (ref 4.2–5.8)
RBC # BLD: 2.91 M/UL — LOW (ref 4.2–5.8)
RBC # BLD: 2.93 M/UL — LOW (ref 4.2–5.8)
RBC # FLD: 15.8 % — HIGH (ref 10.3–14.5)
RBC # FLD: 15.9 % — HIGH (ref 10.3–14.5)
RBC # FLD: 16.2 % — HIGH (ref 10.3–14.5)
SODIUM SERPL-SCNC: 138 MMOL/L — SIGNIFICANT CHANGE UP (ref 135–145)
WBC # BLD: 10.19 K/UL — SIGNIFICANT CHANGE UP (ref 3.8–10.5)
WBC # BLD: 11.94 K/UL — HIGH (ref 3.8–10.5)
WBC # BLD: 9.88 K/UL — SIGNIFICANT CHANGE UP (ref 3.8–10.5)
WBC # FLD AUTO: 10.19 K/UL — SIGNIFICANT CHANGE UP (ref 3.8–10.5)
WBC # FLD AUTO: 11.94 K/UL — HIGH (ref 3.8–10.5)
WBC # FLD AUTO: 9.88 K/UL — SIGNIFICANT CHANGE UP (ref 3.8–10.5)

## 2021-04-03 PROCEDURE — 99233 SBSQ HOSP IP/OBS HIGH 50: CPT

## 2021-04-03 PROCEDURE — 93970 EXTREMITY STUDY: CPT | Mod: 26

## 2021-04-03 RX ADMIN — Medication 25 MILLIGRAM(S): at 16:55

## 2021-04-03 RX ADMIN — PANTOPRAZOLE SODIUM 40 MILLIGRAM(S): 20 TABLET, DELAYED RELEASE ORAL at 06:33

## 2021-04-03 RX ADMIN — TRAMADOL HYDROCHLORIDE 25 MILLIGRAM(S): 50 TABLET ORAL at 02:19

## 2021-04-03 RX ADMIN — PANTOPRAZOLE SODIUM 40 MILLIGRAM(S): 20 TABLET, DELAYED RELEASE ORAL at 16:56

## 2021-04-03 RX ADMIN — TAMSULOSIN HYDROCHLORIDE 0.4 MILLIGRAM(S): 0.4 CAPSULE ORAL at 20:36

## 2021-04-03 RX ADMIN — Medication 6: at 12:09

## 2021-04-03 RX ADMIN — Medication 650 MILLIGRAM(S): at 01:30

## 2021-04-03 RX ADMIN — Medication 650 MILLIGRAM(S): at 06:32

## 2021-04-03 RX ADMIN — Medication 650 MILLIGRAM(S): at 12:09

## 2021-04-03 RX ADMIN — Medication 650 MILLIGRAM(S): at 16:56

## 2021-04-03 RX ADMIN — TRAMADOL HYDROCHLORIDE 25 MILLIGRAM(S): 50 TABLET ORAL at 20:36

## 2021-04-03 RX ADMIN — Medication 650 MILLIGRAM(S): at 12:39

## 2021-04-03 RX ADMIN — Medication 240 MILLIGRAM(S): at 06:32

## 2021-04-03 RX ADMIN — Medication 20 MILLIGRAM(S): at 06:32

## 2021-04-03 RX ADMIN — Medication 25 MILLIGRAM(S): at 06:32

## 2021-04-03 RX ADMIN — SENNA PLUS 2 TABLET(S): 8.6 TABLET ORAL at 20:36

## 2021-04-03 RX ADMIN — Medication 650 MILLIGRAM(S): at 01:00

## 2021-04-03 RX ADMIN — DULOXETINE HYDROCHLORIDE 60 MILLIGRAM(S): 30 CAPSULE, DELAYED RELEASE ORAL at 12:09

## 2021-04-03 RX ADMIN — TRAMADOL HYDROCHLORIDE 25 MILLIGRAM(S): 50 TABLET ORAL at 21:45

## 2021-04-03 NOTE — PROGRESS NOTE ADULT - ASSESSMENT
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  Episode of ? PAF/sinus tachy post HD. Now improved  Rectal bleeding- probably diverticular bleeding.     HR stable on Cardizem  Hold torsemide for now  Agree with stopping Eliquis- Pt on Eliquis for DVT. Known PAF in the past and pt was deemed high risk for anticoagulation for AF.   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  Episode of ? PAF/sinus tachy post HD. Now improved  Rectal bleeding- probably diverticular bleeding.     HR stable on Cardizem  Hold torsemide for now  Agree with stopping Eliquis- Pt on Eliquis for DVT. Known PAF in the past and pt was deemed high risk for anticoagulation for AF.   On Plavix given recent LLE bypass   Monitor H/H- Planned for CTA    d/w family at bedside

## 2021-04-03 NOTE — PROGRESS NOTE ADULT - SUBJECTIVE AND OBJECTIVE BOX
Pt seen and examined f/u for recurrent GI bleeding    Patient seen earlier this admission for recurrent drop in Hgb and dark stool. Underwent an EGD on 3/15 with APC ablation or 4 small gastric AVMs. No other pathology. Since that time patient required LLE fem pop bypass but then required revision. During that surgery had bout of asystole with some transient peacemaker issues which have resolved. Last year he had three moderate to large polyps removed and f/u colonoscopy in June 202 showed some residual polypoid tissue in the ascending colon which was removed with placement of hemoclip over the defect. There were desc and sigmoid diverticuli and moderate sized internal hemorrhoids. This AM he was noted to have passed some burgundy stools. He denies any abdominal pain, nausea or vomiting.    REVIEW OF SYSTEMS:    CONSTITUTIONAL: No fever, weight loss, or fatigue  EYES: No eye pain, visual disturbances, or discharge  ENMT:  No difficulty hearing, tinnitus, vertigo; No sinus or throat pain  RESPIRATORY: No cough, wheezing, chills or hemoptysis; No shortness of breath  CARDIOVASCULAR: No chest pain, palpitations, dizziness, or leg swelling  GASTROINTESTINAL: No abdominal or epigastric pain. No nausea, vomiting, or hematemesis; No diarrhea or constipation. No melena or hematochezia.    MEDICATIONS:  MEDICATIONS  (STANDING):  acetaminophen   Tablet .. 650 milliGRAM(s) Oral every 6 hours  clopidogrel Tablet 75 milliGRAM(s) Oral daily  dextrose 40% Gel 15 Gram(s) Oral once  dextrose 5%. 1000 milliLiter(s) (50 mL/Hr) IV Continuous <Continuous>  dextrose 5%. 1000 milliLiter(s) (100 mL/Hr) IV Continuous <Continuous>  dextrose 50% Injectable 25 Gram(s) IV Push once  dextrose 50% Injectable 12.5 Gram(s) IV Push once  dextrose 50% Injectable 25 Gram(s) IV Push once  diltiazem    milliGRAM(s) Oral daily  DULoxetine 60 milliGRAM(s) Oral daily  epoetin juan-epbx (RETACRIT) Injectable 54949 Unit(s) IV Push <User Schedule>  glucagon  Injectable 1 milliGRAM(s) IntraMuscular once  hydrALAZINE 25 milliGRAM(s) Oral two times a day  insulin lispro (ADMELOG) corrective regimen sliding scale   SubCutaneous three times a day before meals  pantoprazole  Injectable 40 milliGRAM(s) IV Push every 12 hours  polyethylene glycol 3350 17 Gram(s) Oral daily  senna 2 Tablet(s) Oral at bedtime  tamsulosin 0.4 milliGRAM(s) Oral at bedtime  torsemide 20 milliGRAM(s) Oral daily    MEDICATIONS  (PRN):  artificial tears (preservative free) Ophthalmic Solution 1 Drop(s) Both EYES two times a day PRN Dry Eyes  traMADol 25 milliGRAM(s) Oral every 4 hours PRN Severe Pain (7 - 10)      Allergies    Plavix (Hives)  Toprol-XL (Rash)    Intolerances        Vital Signs Last 24 Hrs  T(C): 36.6 (03 Apr 2021 07:53), Max: 37.3 (02 Apr 2021 22:00)  T(F): 97.8 (03 Apr 2021 07:53), Max: 99.2 (02 Apr 2021 22:00)  HR: 65 (03 Apr 2021 07:53) (59 - 65)  BP: 165/54 (03 Apr 2021 07:53) (143/51 - 175/76)  BP(mean): --  RR: 18 (03 Apr 2021 07:53) (16 - 18)  SpO2: 98% (03 Apr 2021 07:53) (95% - 98%)    04-02 @ 07:01  -  04-03 @ 07:00  --------------------------------------------------------  IN: 80 mL / OUT: 0 mL / NET: 80 mL        PHYSICAL EXAM:    General: Well developed; well nourished; in no acute distress  HEENT: MMM, conjunctiva and sclera clear  Gastrointestinal:Abdomen: Soft non-tender non-distended; Normal bowel sounds; No hepatosplenomegaly  Extremities: no cyanosis, clubbing or edema.  Skin: Warm and dry. No obvious rash    LABS:      CBC Full  -  ( 03 Apr 2021 06:21 )  WBC Count : 10.19 K/uL  RBC Count : 2.91 M/uL  Hemoglobin : 8.6 g/dL  Hematocrit : 26.4 %  Platelet Count - Automated : 432 K/uL  Mean Cell Volume : 90.7 fl  Mean Cell Hemoglobin : 29.6 pg  Mean Cell Hemoglobin Concentration : 32.6 gm/dL  Auto Neutrophil # : 8.94 K/uL  Auto Lymphocyte # : 0.45 K/uL  Auto Monocyte # : 0.53 K/uL  Auto Eosinophil # : 0.05 K/uL  Auto Basophil # : 0.03 K/uL  Auto Neutrophil % : 87.7 %  Auto Lymphocyte % : 4.4 %  Auto Monocyte % : 5.2 %  Auto Eosinophil % : 0.5 %  Auto Basophil % : 0.3 %    04-03    138  |  98  |  50.0<H>  ----------------------------<  119<H>  3.9   |  27.0  |  4.24<H>    Ca    8.1<L>      03 Apr 2021 06:21  Phos  3.3     04-03  Mg     2.2     04-03                        RADIOLOGY & ADDITIONAL STUDIES (The following images were personally reviewed): Pt seen and examined f/u for recurrent GI bleeding    Patient seen earlier this admission for recurrent drop in Hgb and dark stool. Underwent an EGD on 3/15 with APC ablation or 4 small gastric AVMs. No other pathology. Since that time patient required LLE fem pop bypass but then required revision on 3/25. . During that surgery he  had a bout of asystole with some transient peacemaker issues which have resolved. Last year he had three moderate to large polyps removed and f/u colonoscopy in June 2020 showed some residual polypoid tissue in the ascending colon which was removed with placement of hemoclip over the defect. There were desc and sigmoid diverticuli and moderate sized internal hemorrhoids. This AM he was noted to have passed some burgundy stools. He denies any abdominal pain, nausea or vomiting. He had been on full dose heparin but later switched to clopidogrel and Eliquis The Eliquis was just stopped. He denies any dizziness or lightheadedness He is on HD twice weekly, last yesterday. There are no dyspeptic symptoms. Also during this admission he developed a DVT. H/H stable as of 6:00 this morning with hgb of 8.6.    REVIEW OF SYSTEMS:    CONSTITUTIONAL: No fever, weight loss, or fatigue  EYES: No eye pain, visual disturbances, or discharge  ENMT:  No difficulty hearing, tinnitus, vertigo; No sinus or throat pain  RESPIRATORY: No cough, wheezing, chills or hemoptysis; No shortness of breath  CARDIOVASCULAR: No chest pain, palpitations, dizziness, or leg swelling  GASTROINTESTINAL: as above    MEDICATIONS:  MEDICATIONS  (STANDING):  acetaminophen   Tablet .. 650 milliGRAM(s) Oral every 6 hours  clopidogrel Tablet 75 milliGRAM(s) Oral daily  dextrose 40% Gel 15 Gram(s) Oral once  dextrose 5%. 1000 milliLiter(s) (50 mL/Hr) IV Continuous <Continuous>  dextrose 5%. 1000 milliLiter(s) (100 mL/Hr) IV Continuous <Continuous>  dextrose 50% Injectable 25 Gram(s) IV Push once  dextrose 50% Injectable 12.5 Gram(s) IV Push once  dextrose 50% Injectable 25 Gram(s) IV Push once  diltiazem    milliGRAM(s) Oral daily  DULoxetine 60 milliGRAM(s) Oral daily  epoetin juan-epbx (RETACRIT) Injectable 21825 Unit(s) IV Push <User Schedule>  glucagon  Injectable 1 milliGRAM(s) IntraMuscular once  hydrALAZINE 25 milliGRAM(s) Oral two times a day  insulin lispro (ADMELOG) corrective regimen sliding scale   SubCutaneous three times a day before meals  pantoprazole  Injectable 40 milliGRAM(s) IV Push every 12 hours  polyethylene glycol 3350 17 Gram(s) Oral daily  senna 2 Tablet(s) Oral at bedtime  tamsulosin 0.4 milliGRAM(s) Oral at bedtime  torsemide 20 milliGRAM(s) Oral daily    MEDICATIONS  (PRN):  artificial tears (preservative free) Ophthalmic Solution 1 Drop(s) Both EYES two times a day PRN Dry Eyes  traMADol 25 milliGRAM(s) Oral every 4 hours PRN Severe Pain (7 - 10)      Allergies    Plavix (Hives)  Toprol-XL (Rash)    Intolerances        Vital Signs Last 24 Hrs  T(C): 36.6 (03 Apr 2021 07:53), Max: 37.3 (02 Apr 2021 22:00)  T(F): 97.8 (03 Apr 2021 07:53), Max: 99.2 (02 Apr 2021 22:00)  HR: 65 (03 Apr 2021 07:53) (59 - 65)  BP: 165/54 (03 Apr 2021 07:53) (143/51 - 175/76)  BP(mean): --  RR: 18 (03 Apr 2021 07:53) (16 - 18)  SpO2: 98% (03 Apr 2021 07:53) (95% - 98%)    04-02 @ 07:01  -  04-03 @ 07:00  --------------------------------------------------------  IN: 80 mL / OUT: 0 mL / NET: 80 mL        PHYSICAL EXAM:    General: elderly male,  well nourished; in no acute distress, sitting up in chair eating a blueberry muffin.  HEENT: MMM, conjunctiva pale  and sclera clear  Gastrointestinal:Abdomen: Soft non-tender non-distended; Normal bowel sounds; No hepatosplenomegaly        LABS:      CBC Full  -  ( 03 Apr 2021 06:21 )  WBC Count : 10.19 K/uL  RBC Count : 2.91 M/uL  Hemoglobin : 8.6 g/dL  Hematocrit : 26.4 %  Platelet Count - Automated : 432 K/uL  Mean Cell Volume : 90.7 fl  Mean Cell Hemoglobin : 29.6 pg  Mean Cell Hemoglobin Concentration : 32.6 gm/dL  Auto Neutrophil # : 8.94 K/uL  Auto Lymphocyte # : 0.45 K/uL  Auto Monocyte # : 0.53 K/uL  Auto Eosinophil # : 0.05 K/uL  Auto Basophil # : 0.03 K/uL  Auto Neutrophil % : 87.7 %  Auto Lymphocyte % : 4.4 %  Auto Monocyte % : 5.2 %  Auto Eosinophil % : 0.5 %  Auto Basophil % : 0.3 %    04-03    138  |  98  |  50.0<H>  ----------------------------<  119<H>  3.9   |  27.0  |  4.24<H>    Ca    8.1<L>      03 Apr 2021 06:21  Phos  3.3     04-03  Mg     2.2     04-03

## 2021-04-03 NOTE — PROGRESS NOTE ADULT - SUBJECTIVE AND OBJECTIVE BOX
[facility]                              [address] [place] [address]                                                 PHONE: [phone]                                                 FAX: [phone]  -----------------------------------------------------------------------------------------------  Pt seen and examined. FU for PAD    Overnight events/Complaints: Episode of some burgundy stools. H/H stable as of 6:00 this morning with hgb of 8.6. Family at bedside. HR stable.    Vital Signs Last 24 Hrs  T(C): 36.6 (03 Apr 2021 07:53), Max: 37.3 (02 Apr 2021 22:00)  T(F): 97.8 (03 Apr 2021 07:53), Max: 99.2 (02 Apr 2021 22:00)  HR: 65 (03 Apr 2021 07:53) (59 - 65)  BP: 165/54 (03 Apr 2021 07:53) (143/51 - 165/54)  BP(mean): --  RR: 18 (03 Apr 2021 07:53) (16 - 18)  SpO2: 98% (03 Apr 2021 07:53) (95% - 98%)  I&O's Summary    02 Apr 2021 07:01  -  03 Apr 2021 07:00  --------------------------------------------------------  IN: 80 mL / OUT: 0 mL / NET: 80 mL    03 Apr 2021 07:01  -  03 Apr 2021 11:41  --------------------------------------------------------  IN: 0 mL / OUT: 250 mL / NET: -250 mL    PHYSICAL EXAM:  Constitutional: Elderly male in bed  HEENT:     Head: Normocephalic and atraumatic  Neck: supple. No JVD  Cardiovascular: regular S1 S2  Respiratory: Lungs clear to auscultation; no crepitations, no wheeze  Gastrointestinal:  Soft, Non-tender, + BS	  Musculoskeletal: Normal range of motion. No edema  Skin: Warm and dry. No cyanosis . No diaphoresis.  Neurologic: Alert oriented to time place and person. Normal strength and no tremor.        LABS:                        8.6    10.19 )-----------( 432      ( 03 Apr 2021 06:21 )             26.4     04-03    138  |  98  |  50.0<H>  ----------------------------<  119<H>  3.9   |  27.0  |  4.24<H>    Ca    8.1<L>      03 Apr 2021 06:21  Phos  3.3     04-03  Mg     2.2     04-03    RADIOLOGY & ADDITIONAL STUDIES: (reviewed)  CXR was independently visualized/reviewed  and demonstrated: bilateral infiltrates    CARDIOLOGY TESTING:(reviewed)     12 lead EKG independently visualized/reviewed  and demonstrated paced rhythm    ECHOCARDIOGRAM independently visualized/reviewed and demonstrated :   Summary:   1. Moderately enlarged left atrium.   2. There is mild concentric left ventricular hypertrophy.   3. Left ventricular ejection fraction, by visual estimation, is 60 to 65%.   4. Grade II diastolic dysfunction. Elevated mean left atrial pressure.   5. Mildly enlarged right atrium.   6. Mildly enlarged right ventricle. Mildly reduced RV systolic function.   7. Mild mitral stenosis. Moderate mitral valve regurgitation. Elevated mean gradient likely due to volume overload. Repeat study after diuresis to erevaluate mitral valve. If clinically indicated.   8. 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 Well seated valve. Acceptable gradients. No regurgitation.   9. Mild tricuspid regurgitation.  10. Estimated pulmonary artery systolic pressure is 78 mmHg - severe pulmonary hypertension.  11. There is no evidence of pericardial effusion.    Catheterization:  RIGHT LOWER EXTREMITY VESSELS: Right superficial femoral: There was a 90 %  stenosis. Right peroneal: There was a 100 % stenosis.  COMPLICATIONS: No complications occurred during the cath lab visit.  Prepared and signed by  Siva Albrecht MD    TELEMETRY independently visualized/reviewed and demonstrated : stable NSR with paced rhythm    MEDICATIONS:(reviewed)  MEDICATIONS  (STANDING):  acetaminophen   Tablet .. 650 milliGRAM(s) Oral every 6 hours  clopidogrel Tablet 75 milliGRAM(s) Oral daily  dextrose 40% Gel 15 Gram(s) Oral once  dextrose 5%. 1000 milliLiter(s) (50 mL/Hr) IV Continuous <Continuous>  dextrose 5%. 1000 milliLiter(s) (100 mL/Hr) IV Continuous <Continuous>  dextrose 50% Injectable 25 Gram(s) IV Push once  dextrose 50% Injectable 12.5 Gram(s) IV Push once  dextrose 50% Injectable 25 Gram(s) IV Push once  diltiazem    milliGRAM(s) Oral daily  DULoxetine 60 milliGRAM(s) Oral daily  epoetin juan-epbx (RETACRIT) Injectable 92833 Unit(s) IV Push <User Schedule>  glucagon  Injectable 1 milliGRAM(s) IntraMuscular once  hydrALAZINE 25 milliGRAM(s) Oral two times a day  insulin lispro (ADMELOG) corrective regimen sliding scale   SubCutaneous three times a day before meals  pantoprazole  Injectable 40 milliGRAM(s) IV Push every 12 hours  polyethylene glycol 3350 17 Gram(s) Oral daily  senna 2 Tablet(s) Oral at bedtime  tamsulosin 0.4 milliGRAM(s) Oral at bedtime  torsemide 20 milliGRAM(s) Oral daily

## 2021-04-03 NOTE — PROGRESS NOTE ADULT - ASSESSMENT
83 y/o M who presented with UGIB and had AVM cliped by GI and also had  LLE critical limb ischemia s/p L CFA to  below the knee pop bypass with PTFE graft placed on 3/20 which required revision with fem AT bypass with cryovein on 3/25 and LLE DVT on Eliquis . Patient w bloody BM per nurse overnight,     - Continue Plavix and Eliquis for LLE DVT   - hold Eliquis while waiting for CBC  - will continue to follow up cardiology recommendations regarding pacemaker and HR.   - GI for LGIB, type and screen and monitor hemodynamics  85 y/o M who presented with UGIB and had AVM cliped by GI and also had  LLE critical limb ischemia s/p L CFA to  below the knee pop bypass with PTFE graft placed on 3/20 which required revision with fem AT bypass with cryovein on 3/25 and LLE DVT on Eliquis . Patient w bloody BM per nurse overnight,     - Continue Plavix and Eliquis for LLE DVT   - Eliquis held for now, will fu GI , consider surveillance duplex and weigh risk vs benefits of filter vs AC   - will continue to follow up cardiology recommendations regarding pacemaker and HR.   - GI for LGIB, type and screen and monitor hemodynamics     FABIAN Hamilton PGY-5

## 2021-04-03 NOTE — PROGRESS NOTE ADULT - SUBJECTIVE AND OBJECTIVE BOX
comfortable in bed  Reports from nurse pt having BM w/ clots overnight  hemodynamics remained stable      Physical Examination:  General: awake, 3 in no acute distress   Lungs: clear   Abdomen: soft, non-tender, non-distended  EXT:            RLE: full ROM,  sensation intact to pin and prick, distal pedal signals            LLE: medial, lateral and inguinal surgical incision sites with staples in place. Healing well with no surrounding erythema, edema or purulent suppuration, tender to palpation, no skin changes, distal pedal signal and palp graft over lateral knee, pitting edema present however improving  comfortable in bed  Reports from nurse pt having BM w/ clots overnight  hemodynamics remained stable  Hb this am 8,6 from 8,6       Physical Examination:  General: awake, 3 in no acute distress   Lungs: clear   Abdomen: soft, non-tender, non-distended  EXT:            RLE: full ROM,  sensation intact to pin and prick, distal pedal signals            LLE: medial, lateral and inguinal surgical incision sites with staples in place. Healing well with no surrounding erythema, edema or purulent suppuration, tender to palpation, no skin changes, distal pedal signal and palp graft over lateral knee, pitting edema present however improving

## 2021-04-03 NOTE — PROGRESS NOTE ADULT - SUBJECTIVE AND OBJECTIVE BOX
Progress Note Adult-Cardiology Attending [Charted Location: [facility] [id number]] [Authored: 03-Apr-2021 11:33]- for Visit: [id number], Complete, Revised, Signed in Full, General  Pt seen and examined. FU for PAD, ESRD - HD BIW,   Overnight events/Complaints: Episode of some burgundy stools. H/H stable as of 6:00 this morning with hgb of 8.6 gms.,  Family at bedside. HR stable.  Vital Signs Last 24 Hrs T(C): 36.6 (03 Apr 2021 07:53), Max: 37.3 (02 Apr 2021 22:00) T(F): 97.8 (03 Apr 2021 07:53), Max: 99.2 (02 Apr 2021 22:00) HR: 65 (03 Apr 2021 07:53) (59 - 65) BP: 165/54 (03 Apr 2021 07:53) (143/51 - 165/54) RR: 18 (03 Apr 2021 07:53) (16 - 18) SpO2: 98% (03 Apr 2021 07:53) (95% - 98%) I&O's Summary  02 Apr 2021 07:01  -  03 Apr 2021 07:00 -------------------------------------------------------- IN: 80 mL / OUT: 0 mL / NET: 80 mL  03 Apr 2021 07:01  -  03 Apr 2021 11:41 -------------------------------------------------------- IN: 0 mL / OUT: 250 mL / NET: -250 mL  PHYSICAL EXAM: Constitutional: Elderly male in bed HEENT:    Head: Normocephalic and atraumatic Neck: supple. No JVD Cardiovascular: regular S1 S2 Respiratory: Lungs clear to auscultation; no crepitations, no wheeze Gastrointestinal:  Soft, Non-tender, + BS	 Musculoskeletal: Normal range of motion. No edema Skin: Warm and dry. No cyanosis . No diaphoresis. Neurologic: Alert oriented to time place and person. Normal strength and no tremor.      LABS:                      8.6   10.19 )-----------( 432      ( 03 Apr 2021 06:21 )            26.4   04-03  138  |  98  |  50.0<H> ----------------------------<  119<H> 3.9   |  27.0  |  4.24<H>  Ca    8.1<L>      03 Apr 2021 06:21 Phos  3.3     04-03 Mg     2.2     04-03  RADIOLOGY & ADDITIONAL STUDIES: (reviewed) CXR was independently visualized/reviewed  and demonstrated: bilateral infiltrates  CARDIOLOGY TESTING:(reviewed)   12 lead EKG independently visualized/reviewed  and demonstrated paced rhythm  ECHOCARDIOGRAM independently visualized/reviewed and demonstrated :  Summary:  1. Moderately enlarged left atrium.  2. There is mild concentric left ventricular hypertrophy.  3. Left ventricular ejection fraction, by visual estimation, is 60 to 65%.  4. Grade II diastolic dysfunction. Elevated mean left atrial pressure.  5. Mildly enlarged right atrium.  6. Mildly enlarged right ventricle. Mildly reduced RV systolic function.  7. Mild mitral stenosis. Moderate mitral valve regurgitation. Elevated mean gradient likely due to volume overload. Repeat study after diuresis to erevaluate mitral valve. If clinically indicated.  8. 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 Well seated valve. Acceptable gradients. No regurgitation.  9. Mild tricuspid regurgitation. 10. Estimated pulmonary artery systolic pressure is 78 mmHg - severe pulmonary hypertension. 11. There is no evidence of pericardial effusion.  Catheterization: RIGHT LOWER EXTREMITY VESSELS: Right superficial femoral: There was a 90 % stenosis. Right peroneal: There was a 100 % stenosis. COMPLICATIONS: No complications occurred during the cath lab visit. Prepared and signed by Siva Albrecht MD  TELEMETRY independently visualized/reviewed and demonstrated : stable NSR with paced rhythm  MEDICATIONS:(reviewed) MEDICATIONS  (STANDING): acetaminophen   Tablet .. 650 milliGRAM(s) Oral every 6 hours clopidogrel Tablet 75 milliGRAM(s) Oral daily dextrose 40% Gel 15 Gram(s) Oral once dextrose 5%. 1000 milliLiter(s) (50 mL/Hr) IV Continuous <Continuous> dextrose 5%. 1000 milliLiter(s) (100 mL/Hr) IV Continuous <Continuous> dextrose 50% Injectable 25 Gram(s) IV Push once dextrose 50% Injectable 12.5 Gram(s) IV Push once dextrose 50% Injectable 25 Gram(s) IV Push once diltiazem    milliGRAM(s) Oral daily DULoxetine 60 milliGRAM(s) Oral daily epoetin juan-epbx (RETACRIT) Injectable 55376 Unit(s) IV Push <User Schedule> glucagon  Injectable 1 milliGRAM(s) IntraMuscular once hydrALAZINE 25 milliGRAM(s) Oral two times a day insulin lispro (ADMELOG) corrective regimen sliding scale   SubCutaneous three times a day before meals pantoprazole  Injectable 40 milliGRAM(s) IV Push every 12 hours polyethylene glycol 3350 17 Gram(s) Oral daily senna 2 Tablet(s) Oral at bedtime tamsulosin 0.4 milliGRAM(s) Oral at bedtime torsemide 20 milliGRAM(s) Oral daily  86y Male h/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 Episode of ? PAF/sinus tachy post HD. Now improved Rectal bleeding- probably diverticular bleeding.   HR stable on Cardizem May D.C torsemide for now Per Cardiology :  stopping Eliquis- Pt on Eliquis for DVT. Known PAF in the past and pt was deemed high risk for anticoagulation for AF.  On Plavix given recent LLE bypass  Monitor H/H- Planned for CTA, followed by HD.

## 2021-04-03 NOTE — PROGRESS NOTE ADULT - PROBLEM SELECTOR PLAN 1
probably diverticular bleeding. Agree with stopping Eliquis and would consider holding the plavix for now as well. Monitor H/H and if bleeding continues would get CTA and if positive for colonic bleed would suggest IR embolization. Will follow

## 2021-04-04 LAB
ANION GAP SERPL CALC-SCNC: 15 MMOL/L — SIGNIFICANT CHANGE UP (ref 5–17)
APTT BLD: 33.8 SEC — SIGNIFICANT CHANGE UP (ref 27.5–35.5)
BLD GP AB SCN SERPL QL: SIGNIFICANT CHANGE UP
BUN SERPL-MCNC: 56 MG/DL — HIGH (ref 8–20)
CALCIUM SERPL-MCNC: 8.2 MG/DL — LOW (ref 8.6–10.2)
CHLORIDE SERPL-SCNC: 99 MMOL/L — SIGNIFICANT CHANGE UP (ref 98–107)
CO2 SERPL-SCNC: 26 MMOL/L — SIGNIFICANT CHANGE UP (ref 22–29)
CREAT SERPL-MCNC: 4.91 MG/DL — HIGH (ref 0.5–1.3)
GLUCOSE BLDC GLUCOMTR-MCNC: 104 MG/DL — HIGH (ref 70–99)
GLUCOSE BLDC GLUCOMTR-MCNC: 124 MG/DL — HIGH (ref 70–99)
GLUCOSE BLDC GLUCOMTR-MCNC: 148 MG/DL — HIGH (ref 70–99)
GLUCOSE BLDC GLUCOMTR-MCNC: 159 MG/DL — HIGH (ref 70–99)
GLUCOSE SERPL-MCNC: 119 MG/DL — HIGH (ref 70–99)
HCT VFR BLD CALC: 24.6 % — LOW (ref 39–50)
HGB BLD-MCNC: 7.8 G/DL — LOW (ref 13–17)
INR BLD: 1.37 RATIO — HIGH (ref 0.88–1.16)
MAGNESIUM SERPL-MCNC: 2.1 MG/DL — SIGNIFICANT CHANGE UP (ref 1.8–2.6)
MCHC RBC-ENTMCNC: 29.2 PG — SIGNIFICANT CHANGE UP (ref 27–34)
MCHC RBC-ENTMCNC: 31.7 GM/DL — LOW (ref 32–36)
MCV RBC AUTO: 92.1 FL — SIGNIFICANT CHANGE UP (ref 80–100)
PHOSPHATE SERPL-MCNC: 4.1 MG/DL — SIGNIFICANT CHANGE UP (ref 2.4–4.7)
PLATELET # BLD AUTO: 433 K/UL — HIGH (ref 150–400)
POTASSIUM SERPL-MCNC: 3.9 MMOL/L — SIGNIFICANT CHANGE UP (ref 3.5–5.3)
POTASSIUM SERPL-SCNC: 3.9 MMOL/L — SIGNIFICANT CHANGE UP (ref 3.5–5.3)
PROTHROM AB SERPL-ACNC: 15.7 SEC — HIGH (ref 10.6–13.6)
RBC # BLD: 2.67 M/UL — LOW (ref 4.2–5.8)
RBC # FLD: 16.1 % — HIGH (ref 10.3–14.5)
SARS-COV-2 RNA SPEC QL NAA+PROBE: SIGNIFICANT CHANGE UP
SODIUM SERPL-SCNC: 140 MMOL/L — SIGNIFICANT CHANGE UP (ref 135–145)
WBC # BLD: 8.81 K/UL — SIGNIFICANT CHANGE UP (ref 3.8–10.5)
WBC # FLD AUTO: 8.81 K/UL — SIGNIFICANT CHANGE UP (ref 3.8–10.5)

## 2021-04-04 PROCEDURE — 99233 SBSQ HOSP IP/OBS HIGH 50: CPT

## 2021-04-04 RX ORDER — HEPARIN SODIUM 5000 [USP'U]/ML
5000 INJECTION INTRAVENOUS; SUBCUTANEOUS EVERY 8 HOURS
Refills: 0 | Status: DISCONTINUED | OUTPATIENT
Start: 2021-04-04 | End: 2021-04-08

## 2021-04-04 RX ADMIN — Medication 650 MILLIGRAM(S): at 00:14

## 2021-04-04 RX ADMIN — Medication 650 MILLIGRAM(S): at 05:06

## 2021-04-04 RX ADMIN — HEPARIN SODIUM 5000 UNIT(S): 5000 INJECTION INTRAVENOUS; SUBCUTANEOUS at 13:03

## 2021-04-04 RX ADMIN — Medication 1 DROP(S): at 19:43

## 2021-04-04 RX ADMIN — PANTOPRAZOLE SODIUM 40 MILLIGRAM(S): 20 TABLET, DELAYED RELEASE ORAL at 17:59

## 2021-04-04 RX ADMIN — Medication 20 MILLIGRAM(S): at 14:58

## 2021-04-04 RX ADMIN — Medication 650 MILLIGRAM(S): at 12:57

## 2021-04-04 RX ADMIN — Medication 650 MILLIGRAM(S): at 00:00

## 2021-04-04 RX ADMIN — Medication 1 DROP(S): at 14:59

## 2021-04-04 RX ADMIN — DULOXETINE HYDROCHLORIDE 60 MILLIGRAM(S): 30 CAPSULE, DELAYED RELEASE ORAL at 12:57

## 2021-04-04 RX ADMIN — Medication 25 MILLIGRAM(S): at 05:06

## 2021-04-04 RX ADMIN — Medication 25 MILLIGRAM(S): at 17:58

## 2021-04-04 RX ADMIN — Medication 650 MILLIGRAM(S): at 17:59

## 2021-04-04 RX ADMIN — HEPARIN SODIUM 5000 UNIT(S): 5000 INJECTION INTRAVENOUS; SUBCUTANEOUS at 23:08

## 2021-04-04 RX ADMIN — TRAMADOL HYDROCHLORIDE 25 MILLIGRAM(S): 50 TABLET ORAL at 01:15

## 2021-04-04 RX ADMIN — TRAMADOL HYDROCHLORIDE 25 MILLIGRAM(S): 50 TABLET ORAL at 00:38

## 2021-04-04 RX ADMIN — Medication 20 MILLIGRAM(S): at 05:06

## 2021-04-04 RX ADMIN — Medication 240 MILLIGRAM(S): at 05:06

## 2021-04-04 RX ADMIN — PANTOPRAZOLE SODIUM 40 MILLIGRAM(S): 20 TABLET, DELAYED RELEASE ORAL at 05:06

## 2021-04-04 RX ADMIN — Medication 3: at 12:56

## 2021-04-04 NOTE — PROGRESS NOTE ADULT - ASSESSMENT
Diet Modified,    PRBC Tx., w. HD in AM,    PMR - F.U    D/W the daughter @ length, On [facility],

## 2021-04-04 NOTE — PROGRESS NOTE ADULT - PROBLEM SELECTOR PLAN 1
possibly diverticular while on plavix and Eliquis. It appears that the bleeding may have stopped. Trend H/H. Consider IVC filter. Will follow.

## 2021-04-04 NOTE — PROGRESS NOTE ADULT - SUBJECTIVE AND OBJECTIVE BOX
Pt seen and examined f/u burgundy stools and drop in hemoglobin    This AM Hgb down somewhat to 7.8. According to daughter his last BM last night was nonbloody. He denies any abdominal pain, nausea or vomiting. Plavix and Eliquis on hold. INR normal    REVIEW OF SYSTEMS:    CONSTITUTIONAL: No fever, weight loss, or fatigue  EYES: No eye pain, visual disturbances, or discharge  ENMT:  No difficulty hearing, tinnitus, vertigo; No sinus or throat pain  RESPIRATORY: No cough, wheezing, chills or hemoptysis; No shortness of breath  CARDIOVASCULAR: No chest pain, palpitations, dizziness, or leg swelling  GASTROINTESTINAL: as above    MEDICATIONS:  MEDICATIONS  (STANDING):  acetaminophen   Tablet .. 650 milliGRAM(s) Oral every 6 hours  dextrose 40% Gel 15 Gram(s) Oral once  dextrose 5%. 1000 milliLiter(s) (50 mL/Hr) IV Continuous <Continuous>  dextrose 5%. 1000 milliLiter(s) (100 mL/Hr) IV Continuous <Continuous>  dextrose 50% Injectable 25 Gram(s) IV Push once  dextrose 50% Injectable 12.5 Gram(s) IV Push once  dextrose 50% Injectable 25 Gram(s) IV Push once  diltiazem    milliGRAM(s) Oral daily  DULoxetine 60 milliGRAM(s) Oral daily  epoetin juan-epbx (RETACRIT) Injectable 00442 Unit(s) IV Push <User Schedule>  glucagon  Injectable 1 milliGRAM(s) IntraMuscular once  hydrALAZINE 25 milliGRAM(s) Oral two times a day  insulin lispro (ADMELOG) corrective regimen sliding scale   SubCutaneous three times a day before meals  pantoprazole  Injectable 40 milliGRAM(s) IV Push every 12 hours  polyethylene glycol 3350 17 Gram(s) Oral daily  senna 2 Tablet(s) Oral at bedtime  tamsulosin 0.4 milliGRAM(s) Oral at bedtime  torsemide 20 milliGRAM(s) Oral daily    MEDICATIONS  (PRN):  artificial tears (preservative free) Ophthalmic Solution 1 Drop(s) Both EYES two times a day PRN Dry Eyes  traMADol 25 milliGRAM(s) Oral every 4 hours PRN Severe Pain (7 - 10)      Allergies    Plavix (Hives)  Toprol-XL (Rash)    Intolerances        Vital Signs Last 24 Hrs  T(C): 36.8 (04 Apr 2021 07:50), Max: 36.8 (04 Apr 2021 05:03)  T(F): 98.3 (04 Apr 2021 07:50), Max: 98.3 (04 Apr 2021 07:50)  HR: 62 (04 Apr 2021 07:50) (53 - 69)  BP: 150/61 (04 Apr 2021 07:50) (133/43 - 180/66)  BP(mean): --  RR: 20 (04 Apr 2021 07:50) (20 - 24)  SpO2: 99% (04 Apr 2021 07:50) (95% - 99%)    04-03 @ 07:01  -  04-04 @ 07:00  --------------------------------------------------------  IN: 240 mL / OUT: 600 mL / NET: -360 mL        PHYSICAL EXAM:    General: chronically ill appearing male in no acute distress, sitting up in chair  HEENT: MMM, conjunctiva pale and sclera clear  Gastrointestinal:Abdomen: Soft non-tender non-distended; Normal bowel sounds; No hepatosplenomegaly but limited exam      LABS:      CBC Full  -  ( 04 Apr 2021 06:40 )  WBC Count : 8.81 K/uL  RBC Count : 2.67 M/uL  Hemoglobin : 7.8 g/dL  Hematocrit : 24.6 %  Platelet Count - Automated : 433 K/uL  Mean Cell Volume : 92.1 fl  Mean Cell Hemoglobin : 29.2 pg  Mean Cell Hemoglobin Concentration : 31.7 gm/dL  Auto Neutrophil # : x  Auto Lymphocyte # : x  Auto Monocyte # : x  Auto Eosinophil # : x  Auto Basophil # : x  Auto Neutrophil % : x  Auto Lymphocyte % : x  Auto Monocyte % : x  Auto Eosinophil % : x  Auto Basophil % : x    04-04    140  |  99  |  56.0<H>  ----------------------------<  119<H>  3.9   |  26.0  |  4.91<H>    Ca    8.2<L>      04 Apr 2021 06:40  Phos  4.1     04-04  Mg     2.1     04-04      PT/INR - ( 04 Apr 2021 06:40 )   PT: 15.7 sec;   INR: 1.37 ratio         PTT - ( 04 Apr 2021 06:40 )  PTT:33.8 sec

## 2021-04-04 NOTE — PROGRESS NOTE ADULT - SUBJECTIVE AND OBJECTIVE BOX
[facility]                              [address] [place] [address]                                                 PHONE: [phone]                                                 FAX: [phone]  -----------------------------------------------------------------------------------------------  Pt seen and examined. FU for PAD    Overnight events/Complaints: Pt appears pale. Reportedly his last BM last night was nonbloody.     Vital Signs Last 24 Hrs  T(C): 36.8 (04 Apr 2021 07:50), Max: 36.8 (04 Apr 2021 05:03)  T(F): 98.3 (04 Apr 2021 07:50), Max: 98.3 (04 Apr 2021 07:50)  HR: 62 (04 Apr 2021 07:50) (62 - 69)  BP: 150/61 (04 Apr 2021 07:50) (133/43 - 180/66)  BP(mean): --  RR: 20 (04 Apr 2021 07:50) (20 - 24)  SpO2: 99% (04 Apr 2021 07:50) (95% - 99%)  I&O's Summary    03 Apr 2021 07:01  -  04 Apr 2021 07:00  --------------------------------------------------------  IN: 240 mL / OUT: 600 mL / NET: -360 mL    04 Apr 2021 07:01  -  04 Apr 2021 12:18  --------------------------------------------------------  IN: 0 mL / OUT: 300 mL / NET: -300 mL    PHYSICAL EXAM:  Constitutional: Elderly male in bed  HEENT:     Head: Normocephalic and atraumatic  Neck: supple. No JVD  Cardiovascular: regular S1 S2  Respiratory: Lungs clear to auscultation; no crepitations, no wheeze  Gastrointestinal:  Soft, Non-tender, + BS	  Musculoskeletal: Normal range of motion. No edema  Skin: Warm and dry. No cyanosis . No diaphoresis.  Neurologic: Alert oriented to time place and person. Normal strength and no tremor.        LABS:                        7.8    8.81  )-----------( 433      ( 04 Apr 2021 06:40 )             24.6     04-04    140  |  99  |  56.0<H>  ----------------------------<  119<H>  3.9   |  26.0  |  4.91<H>    Ca    8.2<L>      04 Apr 2021 06:40  Phos  4.1     04-04  Mg     2.1     04-04          PT/INR - ( 04 Apr 2021 06:40 )   PT: 15.7 sec;   INR: 1.37 ratio         PTT - ( 04 Apr 2021 06:40 )  PTT:33.8 sec        RADIOLOGY & ADDITIONAL STUDIES: (reviewed)  CXR was independently visualized/reviewed  and demonstrated: bilateral infiltrates    CARDIOLOGY TESTING:(reviewed)     12 lead EKG independently visualized/reviewed  and demonstrated paced rhythm    ECHOCARDIOGRAM independently visualized/reviewed and demonstrated :   Summary:   1. Moderately enlarged left atrium.   2. There is mild concentric left ventricular hypertrophy.   3. Left ventricular ejection fraction, by visual estimation, is 60 to 65%.   4. Grade II diastolic dysfunction. Elevated mean left atrial pressure.   5. Mildly enlarged right atrium.   6. Mildly enlarged right ventricle. Mildly reduced RV systolic function.   7. Mild mitral stenosis. Moderate mitral valve regurgitation. Elevated mean gradient likely due to volume overload. Repeat study after diuresis to erevaluate mitral valve. If clinically indicated.   8. 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 Well seated valve. Acceptable gradients. No regurgitation.   9. Mild tricuspid regurgitation.  10. Estimated pulmonary artery systolic pressure is 78 mmHg - severe pulmonary hypertension.  11. There is no evidence of pericardial effusion.    Catheterization:  RIGHT LOWER EXTREMITY VESSELS: Right superficial femoral: There was a 90 %  stenosis. Right peroneal: There was a 100 % stenosis.  COMPLICATIONS: No complications occurred during the cath lab visit.  Prepared and signed by  Siva Albrecht MD    TELEMETRY independently visualized/reviewed and demonstrated : stable NSR with paced rhythm    MEDICATIONS:(reviewed)  MEDICATIONS  (STANDING):  acetaminophen   Tablet .. 650 milliGRAM(s) Oral every 6 hours  dextrose 40% Gel 15 Gram(s) Oral once  dextrose 5%. 1000 milliLiter(s) (50 mL/Hr) IV Continuous <Continuous>  dextrose 5%. 1000 milliLiter(s) (100 mL/Hr) IV Continuous <Continuous>  dextrose 50% Injectable 25 Gram(s) IV Push once  dextrose 50% Injectable 12.5 Gram(s) IV Push once  dextrose 50% Injectable 25 Gram(s) IV Push once  diltiazem    milliGRAM(s) Oral daily  DULoxetine 60 milliGRAM(s) Oral daily  epoetin juan-epbx (RETACRIT) Injectable 17850 Unit(s) IV Push <User Schedule>  glucagon  Injectable 1 milliGRAM(s) IntraMuscular once  heparin   Injectable 5000 Unit(s) SubCutaneous every 8 hours  hydrALAZINE 25 milliGRAM(s) Oral two times a day  insulin lispro (ADMELOG) corrective regimen sliding scale   SubCutaneous three times a day before meals  pantoprazole  Injectable 40 milliGRAM(s) IV Push every 12 hours  polyethylene glycol 3350 17 Gram(s) Oral daily  senna 2 Tablet(s) Oral at bedtime  tamsulosin 0.4 milliGRAM(s) Oral at bedtime  torsemide 20 milliGRAM(s) Oral <User Schedule>

## 2021-04-04 NOTE — PROGRESS NOTE ADULT - ASSESSMENT
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  Episode of ? PAF/sinus tachy post HD. Now improved  Rectal bleeding- probably diverticular bleeding    HR stable on Cardizem  Off Eliquis- Pt on Eliquis for DVT. Known PAF in the past and pt was deemed high risk for anticoagulation for AF.   On Plavix given recent LLE bypass   Monitor H/H  BP slightly elevated but acceptable. Due for HD tomorrow. Can increase hydralazine to 25 mg TID if needed    d/w family at bedside

## 2021-04-04 NOTE — PROGRESS NOTE ADULT - ASSESSMENT
83 y/o M who presented with UGIB and had AVM cliped by GI and also had  LLE critical limb ischemia s/p L CFA to  below the knee pop bypass with PTFE graft placed on 3/20 which required revision with fem AT bypass with cryovein on 3/25 and LLE DVT on Eliquis . Patient w bloody BM per nurse overnight on 4/3. Hgb downtrending     - Eliquis and plavix held for now, weigh risk vs benefits of filter vs AC   - appreciate GI recs  - cont wrap LLE   - will continue to follow up cardiology recommendations regarding pacemaker and HR.   - GI for LGIB, type and screen and monitor hemodynamics

## 2021-04-04 NOTE — PROGRESS NOTE ADULT - SUBJECTIVE AND OBJECTIVE BOX
Vascular Surgery Progress Note:  Patient w/ GI bleed yesterday, eliquis and plavix held. Hgb downtrending.     Medications:   acetaminophen   Tablet .. 650 milliGRAM(s) Oral every 6 hours  dextrose 40% Gel 15 Gram(s) Oral once  dextrose 5%. 1000 milliLiter(s) (50 mL/Hr) IV Continuous <Continuous>  dextrose 5%. 1000 milliLiter(s) (100 mL/Hr) IV Continuous <Continuous>  dextrose 50% Injectable 25 Gram(s) IV Push once  dextrose 50% Injectable 12.5 Gram(s) IV Push once  dextrose 50% Injectable 25 Gram(s) IV Push once  diltiazem    milliGRAM(s) Oral daily  DULoxetine 60 milliGRAM(s) Oral daily  epoetin juan-epbx (RETACRIT) Injectable 26324 Unit(s) IV Push <User Schedule>  glucagon  Injectable 1 milliGRAM(s) IntraMuscular once  hydrALAZINE 25 milliGRAM(s) Oral two times a day  insulin lispro (ADMELOG) corrective regimen sliding scale   SubCutaneous three times a day before meals  pantoprazole  Injectable 40 milliGRAM(s) IV Push every 12 hours  polyethylene glycol 3350 17 Gram(s) Oral daily  senna 2 Tablet(s) Oral at bedtime  tamsulosin 0.4 milliGRAM(s) Oral at bedtime  torsemide 20 milliGRAM(s) Oral daily    artificial tears (preservative free) Ophthalmic Solution 1 Drop(s) Both EYES two times a day PRN Dry Eyes  traMADol 25 milliGRAM(s) Oral every 4 hours PRN Severe Pain (7 - 10)      Objective:   T(F): 98.3 (04-04 @ 07:50), Max: 98.3 (04-04 @ 07:50)  HR: 62 (04-04 @ 07:50) (53 - 69)  BP: 150/61 (04-04 @ 07:50) (133/43 - 180/66)  BP(mean): --  ABP: --  ABP(mean): --  RR: 20 (04-04 @ 07:50) (20 - 24)  SpO2: 99% (04-04 @ 07:50) (95% - 99%)      Physical Exam:   General: awake, 3 in no acute distress   Lungs: clear   Abdomen: soft, non-tender, non-distended  EXT:            RLE: full ROM,  sensation intact to pin and prick, distal pedal signals            LLE: medial, lateral and inguinal surgical incision sites with staples in place. Healing well with no surrounding erythema, edema or purulent suppuration, tender to palpation, no skin changes, distal pedal signal and palp graft over lateral knee, pitting edema present however improving    I&O's    04-03 @ 07:01  -  04-04 @ 07:00  --------------------------------------------------------  IN:    Oral Fluid: 240 mL  Total IN: 240 mL    OUT:    Voided (mL): 600 mL  Total OUT: 600 mL    Total NET: -360 mL          LABS:                        7.8    8.81  )-----------( 433      ( 04 Apr 2021 06:40 )             24.6     04-04    140  |  99  |  56.0<H>  ----------------------------<  119<H>  3.9   |  26.0  |  4.91<H>    Ca    8.2<L>      04 Apr 2021 06:40  Phos  4.1     04-04  Mg     2.1     04-04      PT/INR - ( 04 Apr 2021 06:40 )   PT: 15.7 sec;   INR: 1.37 ratio         PTT - ( 04 Apr 2021 06:40 )  PTT:33.8 sec      MICROBIOLOGY:       PATHOLOGY:  Surgical Pathology Report:   ACCESSION No:  [id number]  CHRISTIANO ELIZABETH                        1        Surgical Final Report          Final Diagnosis    Bypass graft, left lower extremity NOS, excision  - Vascular prosthetic graft, gross exam    Verified by: Star Alcala M.D.  (Electronic Signature)  Reported on: 03/27/21 12:21 EDT, [facility],  [address] [place] [address]  Phone: [phone]   Fax: [phone]  _________________________________________________________________    Clinical History  Ischemic left lower extremity    Specimen(s) Submitted  1     Explanted left lower extremity bypass graft    Gross Description  The specimen is received unfixed, labeled "explant left bypass  graft". It consists of a rubbery bloodstained tubular tan white  ribbed graft, measuring 41 cm in length and 0.7 cm in diameter.  The specimen is for gross examination only.    Disclaimer  In addition to other data that may appear on the specimen  containers, all labels have been inspectedto confirm the  presence of the patient's name and date of birth.    Histological Processing Performed at [facility],  Department of Pathology, [address] [place] [address].      Surgical Consult Report          Disclaimer  In additionto other data that may appear on the specimen  containers, all labels have been inspected to confirm the  presence of the patient's name and date of birth.    Histological Processing Performed at [facility],  Department of Pathology, [address] [place] [address]. (03-25-21 @ 17:00)

## 2021-04-04 NOTE — PROGRESS NOTE ADULT - SUBJECTIVE AND OBJECTIVE BOX
Pt seen and examined. FU for PAD, ESRD - HD BIW,     Overnight events/Complaints: Episode of some burgundy stools. H/H stable as of 6:00 this morning with hgb of 8.6 gms.,  Family at bedside. HR stable.    Vital Signs Last 24 Hrs  T(C): 36.6 (03 Apr 2021 07:53), Max: 37.3 (02 Apr 2021 22:00)  T(F): 97.8 (03 Apr 2021 07:53), Max: 99.2 (02 Apr 2021 22:00)  HR: 65 (03 Apr 2021 07:53) (59 - 65)  BP: 165/54 (03 Apr 2021 07:53) (143/51 - 165/54)  RR: 18 (03 Apr 2021 07:53) (16 - 18)  SpO2: 98% (03 Apr 2021 07:53) (95% - 98%)  I&O's Summary    02 Apr 2021 07:01  -  03 Apr 2021 07:00  --------------------------------------------------------  IN: 80 mL / OUT: 0 mL / NET: 80 mL    03 Apr 2021 07:01  -  03 Apr 2021 11:41  --------------------------------------------------------  IN: 0 mL / OUT: 250 mL / NET: -250 mL    PHYSICAL EXAM:  Constitutional: Elderly male in bed  HEENT:     Head: Normocephalic and atraumatic  Neck: supple. No JVD  Cardiovascular: regular S1 S2  Respiratory: Lungs clear to auscultation; no crepitations, no wheeze  Gastrointestinal:  Soft, Non-tender, + BS	  Musculoskeletal: Normal range of motion. No edema  Skin: Warm and dry. No cyanosis . No diaphoresis.  Neurologic: Alert oriented to time place and person. Normal strength and no tremor.        LABS:                        8.6    10.19 )-----------( 432      ( 03 Apr 2021 06:21 )             26.4     04-03    138  |  98  |  50.0<H>  ----------------------------<  119<H>  3.9   |  27.0  |  4.24<H>    Ca    8.1<L>      03 Apr 2021 06:21  Phos  3.3     04-03  Mg     2.2     04-03    RADIOLOGY & ADDITIONAL STUDIES: (reviewed)  CXR was independently visualized/reviewed  and demonstrated: bilateral infiltrates    CARDIOLOGY TESTING:(reviewed)     12 lead EKG independently visualized/reviewed  and demonstrated paced rhythm    ECHOCARDIOGRAM independently visualized/reviewed and demonstrated :   Summary:   1. Moderately enlarged left atrium.   2. There is mild concentric left ventricular hypertrophy.   3. Left ventricular ejection fraction, by visual estimation, is 60 to 65%.   4. Grade II diastolic dysfunction. Elevated mean left atrial pressure.   5. Mildly enlarged right atrium.   6. Mildly enlarged right ventricle. Mildly reduced RV systolic function.   7. Mild mitral stenosis. Moderate mitral valve regurgitation. Elevated mean gradient likely due to volume overload. Repeat study after diuresis to erevaluate mitral valve. If clinically indicated.   8. 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 Well seated valve. Acceptable gradients. No regurgitation.   9. Mild tricuspid regurgitation.  10. Estimated pulmonary artery systolic pressure is 78 mmHg - severe pulmonary hypertension.  11. There is no evidence of pericardial effusion.    Catheterization:  RIGHT LOWER EXTREMITY VESSELS: Right superficial femoral: There was a 90 %  stenosis. Right peroneal: There was a 100 % stenosis.  COMPLICATIONS: No complications occurred during the cath lab visit.  Prepared and signed by  Siva Albrecht MD    TELEMETRY independently visualized/reviewed and demonstrated : stable NSR with paced rhythm    MEDICATIONS:(reviewed)  MEDICATIONS  (STANDING):  acetaminophen   Tablet .. 650 milliGRAM(s) Oral every 6 hours  clopidogrel Tablet 75 milliGRAM(s) Oral daily  dextrose 40% Gel 15 Gram(s) Oral once  dextrose 5%. 1000 milliLiter(s) (50 mL/Hr) IV Continuous <Continuous>  dextrose 5%. 1000 milliLiter(s) (100 mL/Hr) IV Continuous <Continuous>  dextrose 50% Injectable 25 Gram(s) IV Push once  dextrose 50% Injectable 12.5 Gram(s) IV Push once  dextrose 50% Injectable 25 Gram(s) IV Push once  diltiazem    milliGRAM(s) Oral daily  DULoxetine 60 milliGRAM(s) Oral daily  epoetin juan-epbx (RETACRIT) Injectable 28695 Unit(s) IV Push <User Schedule>  glucagon  Injectable 1 milliGRAM(s) IntraMuscular once  hydrALAZINE 25 milliGRAM(s) Oral two times a day  insulin lispro (ADMELOG) corrective regimen sliding scale   SubCutaneous three times a day before meals  pantoprazole  Injectable 40 milliGRAM(s) IV Push every 12 hours  polyethylene glycol 3350 17 Gram(s) Oral daily  senna 2 Tablet(s) Oral at bedtime  tamsulosin 0.4 milliGRAM(s) Oral at bedtime  torsemide 20 milliGRAM(s) Oral daily    86y Male h/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  Episode of ? PAF/sinus tachy post HD. Now improved  Rectal bleeding- probably diverticular bleeding.     HR stable on Cardizem  May D.C torsemide for now  Per Cardiology :  stopping Eliquis- Pt on Eliquis for DVT. Known PAF in the past and pt was deemed high risk for anticoagulation for AF.   On Plavix given recent LLE bypass   Monitor H/H- Planned for CTA, followed by HD.    HD On Monday,    [facility]waits Rehabilitation, ( ? Acute care - [place] )    Prognosis remains guarded,

## 2021-04-05 DIAGNOSIS — D64.9 ANEMIA, UNSPECIFIED: ICD-10-CM

## 2021-04-05 LAB
ANION GAP SERPL CALC-SCNC: 14 MMOL/L — SIGNIFICANT CHANGE UP (ref 5–17)
ANISOCYTOSIS BLD QL: SLIGHT — SIGNIFICANT CHANGE UP
BASOPHILS # BLD AUTO: 0.1 K/UL — SIGNIFICANT CHANGE UP (ref 0–0.2)
BASOPHILS NFR BLD AUTO: 0.9 % — SIGNIFICANT CHANGE UP (ref 0–2)
BUN SERPL-MCNC: 69 MG/DL — HIGH (ref 8–20)
BURR CELLS BLD QL SMEAR: PRESENT — SIGNIFICANT CHANGE UP
CALCIUM SERPL-MCNC: 8.2 MG/DL — LOW (ref 8.6–10.2)
CHLORIDE SERPL-SCNC: 98 MMOL/L — SIGNIFICANT CHANGE UP (ref 98–107)
CO2 SERPL-SCNC: 25 MMOL/L — SIGNIFICANT CHANGE UP (ref 22–29)
CREAT SERPL-MCNC: 5.09 MG/DL — HIGH (ref 0.5–1.3)
EOSINOPHIL # BLD AUTO: 0 K/UL — SIGNIFICANT CHANGE UP (ref 0–0.5)
EOSINOPHIL NFR BLD AUTO: 0 % — SIGNIFICANT CHANGE UP (ref 0–6)
GIANT PLATELETS BLD QL SMEAR: PRESENT — SIGNIFICANT CHANGE UP
GLUCOSE BLDC GLUCOMTR-MCNC: 123 MG/DL — HIGH (ref 70–99)
GLUCOSE BLDC GLUCOMTR-MCNC: 124 MG/DL — HIGH (ref 70–99)
GLUCOSE BLDC GLUCOMTR-MCNC: 136 MG/DL — HIGH (ref 70–99)
GLUCOSE BLDC GLUCOMTR-MCNC: 165 MG/DL — HIGH (ref 70–99)
GLUCOSE SERPL-MCNC: 110 MG/DL — HIGH (ref 70–99)
HCT VFR BLD CALC: 28.7 % — LOW (ref 39–50)
HGB BLD-MCNC: 9.6 G/DL — LOW (ref 13–17)
LYMPHOCYTES # BLD AUTO: 0.3 K/UL — LOW (ref 1–3.3)
LYMPHOCYTES # BLD AUTO: 2.6 % — LOW (ref 13–44)
MACROCYTES BLD QL: SLIGHT — SIGNIFICANT CHANGE UP
MAGNESIUM SERPL-MCNC: 2.1 MG/DL — SIGNIFICANT CHANGE UP (ref 1.6–2.6)
MCHC RBC-ENTMCNC: 29.9 PG — SIGNIFICANT CHANGE UP (ref 27–34)
MCHC RBC-ENTMCNC: 33.4 GM/DL — SIGNIFICANT CHANGE UP (ref 32–36)
MCV RBC AUTO: 89.4 FL — SIGNIFICANT CHANGE UP (ref 80–100)
MICROCYTES BLD QL: SLIGHT — SIGNIFICANT CHANGE UP
MONOCYTES # BLD AUTO: 0.1 K/UL — SIGNIFICANT CHANGE UP (ref 0–0.9)
MONOCYTES NFR BLD AUTO: 0.9 % — LOW (ref 2–14)
NEUTROPHILS # BLD AUTO: 10.85 K/UL — HIGH (ref 1.8–7.4)
NEUTROPHILS NFR BLD AUTO: 95.6 % — HIGH (ref 43–77)
OB PNL STL: POSITIVE
PHOSPHATE SERPL-MCNC: 4.1 MG/DL — SIGNIFICANT CHANGE UP (ref 2.4–4.7)
PLAT MORPH BLD: NORMAL — SIGNIFICANT CHANGE UP
PLATELET # BLD AUTO: 519 K/UL — HIGH (ref 150–400)
POIKILOCYTOSIS BLD QL AUTO: SLIGHT — SIGNIFICANT CHANGE UP
POLYCHROMASIA BLD QL SMEAR: SLIGHT — SIGNIFICANT CHANGE UP
POTASSIUM SERPL-MCNC: 4.4 MMOL/L — SIGNIFICANT CHANGE UP (ref 3.5–5.3)
POTASSIUM SERPL-SCNC: 4.4 MMOL/L — SIGNIFICANT CHANGE UP (ref 3.5–5.3)
RBC # BLD: 3.21 M/UL — LOW (ref 4.2–5.8)
RBC # FLD: 16.8 % — HIGH (ref 10.3–14.5)
RBC BLD AUTO: ABNORMAL
SCHISTOCYTES BLD QL AUTO: SLIGHT — SIGNIFICANT CHANGE UP
SODIUM SERPL-SCNC: 137 MMOL/L — SIGNIFICANT CHANGE UP (ref 135–145)
TARGETS BLD QL SMEAR: SIGNIFICANT CHANGE UP
WBC # BLD: 11.35 K/UL — HIGH (ref 3.8–10.5)
WBC # FLD AUTO: 11.35 K/UL — HIGH (ref 3.8–10.5)

## 2021-04-05 PROCEDURE — 90937 HEMODIALYSIS REPEATED EVAL: CPT

## 2021-04-05 PROCEDURE — 99223 1ST HOSP IP/OBS HIGH 75: CPT

## 2021-04-05 RX ORDER — ERYTHROPOIETIN 10000 [IU]/ML
10000 INJECTION, SOLUTION INTRAVENOUS; SUBCUTANEOUS
Refills: 0 | Status: DISCONTINUED | OUTPATIENT
Start: 2021-04-05 | End: 2021-04-07

## 2021-04-05 RX ORDER — CEFAZOLIN SODIUM 1 G
1000 VIAL (EA) INJECTION EVERY 24 HOURS
Refills: 0 | Status: DISCONTINUED | OUTPATIENT
Start: 2021-04-05 | End: 2021-04-08

## 2021-04-05 RX ORDER — CEFAZOLIN SODIUM 1 G
1000 VIAL (EA) INJECTION EVERY 8 HOURS
Refills: 0 | Status: DISCONTINUED | OUTPATIENT
Start: 2021-04-05 | End: 2021-04-05

## 2021-04-05 RX ORDER — ERYTHROPOIETIN 10000 [IU]/ML
10000 INJECTION, SOLUTION INTRAVENOUS; SUBCUTANEOUS
Refills: 0 | Status: DISCONTINUED | OUTPATIENT
Start: 2021-04-05 | End: 2021-04-08

## 2021-04-05 RX ADMIN — PANTOPRAZOLE SODIUM 40 MILLIGRAM(S): 20 TABLET, DELAYED RELEASE ORAL at 05:04

## 2021-04-05 RX ADMIN — ERYTHROPOIETIN 10000 UNIT(S): 10000 INJECTION, SOLUTION INTRAVENOUS; SUBCUTANEOUS at 16:51

## 2021-04-05 RX ADMIN — SENNA PLUS 2 TABLET(S): 8.6 TABLET ORAL at 21:09

## 2021-04-05 RX ADMIN — Medication 25 MILLIGRAM(S): at 17:32

## 2021-04-05 RX ADMIN — Medication 3: at 12:20

## 2021-04-05 RX ADMIN — HEPARIN SODIUM 5000 UNIT(S): 5000 INJECTION INTRAVENOUS; SUBCUTANEOUS at 21:09

## 2021-04-05 RX ADMIN — TRAMADOL HYDROCHLORIDE 25 MILLIGRAM(S): 50 TABLET ORAL at 21:08

## 2021-04-05 RX ADMIN — Medication 100 MILLIGRAM(S): at 17:34

## 2021-04-05 RX ADMIN — TRAMADOL HYDROCHLORIDE 25 MILLIGRAM(S): 50 TABLET ORAL at 21:50

## 2021-04-05 RX ADMIN — Medication 650 MILLIGRAM(S): at 17:32

## 2021-04-05 RX ADMIN — Medication 650 MILLIGRAM(S): at 13:46

## 2021-04-05 RX ADMIN — Medication 650 MILLIGRAM(S): at 20:04

## 2021-04-05 RX ADMIN — Medication 20 MILLIGRAM(S): at 17:32

## 2021-04-05 RX ADMIN — Medication 650 MILLIGRAM(S): at 05:04

## 2021-04-05 RX ADMIN — TAMSULOSIN HYDROCHLORIDE 0.4 MILLIGRAM(S): 0.4 CAPSULE ORAL at 21:09

## 2021-04-05 RX ADMIN — PANTOPRAZOLE SODIUM 40 MILLIGRAM(S): 20 TABLET, DELAYED RELEASE ORAL at 17:32

## 2021-04-05 RX ADMIN — Medication 650 MILLIGRAM(S): at 06:32

## 2021-04-05 RX ADMIN — DULOXETINE HYDROCHLORIDE 60 MILLIGRAM(S): 30 CAPSULE, DELAYED RELEASE ORAL at 12:20

## 2021-04-05 RX ADMIN — Medication 650 MILLIGRAM(S): at 12:20

## 2021-04-05 RX ADMIN — Medication 240 MILLIGRAM(S): at 05:04

## 2021-04-05 NOTE — PROGRESS NOTE ADULT - PROBLEM SELECTOR PLAN 1
Most likely diverticular in nature  Continue to trend Hemoglobin.  Pantoprazole 40mg for mucosal cytoprotection. Most likely diverticular in nature  Continue to trend Hemoglobin.  Pantoprazole 40mg for mucosal cytoprotection.  Continue to hold off any anticoagulation if possible. Resume plavix if hct stays stable  Consider IVD filter if possible for DVT

## 2021-04-05 NOTE — CONSULT NOTE ADULT - SUBJECTIVE AND OBJECTIVE BOX
86yM was admitted on  with progressive weakness in his legs related to PAD s/p stent and took significant Motrin for the pain which caused GI bleed and anemia s/p transfusion.     Imaging performed:  CTA CHEST 3/22 - 1. No central, lobar or segmental PE. Limited evaluation of subsegmental branches. 2. Small to moderate bilateral pleural effusions with associated atelectasis. Pulmonary edema. 3. Scattered areas of tree-in-bud nodularity likely reflects impacted distal airways versus infectious/inflammatory small airways disease.    HEAD CT 3/22 - No CT evidence of acute intracranial abnormality.    BLE DOPPLER 3/23 - Acute left lower extremity deep venous thrombosis, above and below the knee. The left femoral-popliteal bypass graft could not be identified on this ultrasound examination.    BUE DOPPLER 3/29 - No evidence of right upper extremity deep venous thrombosis. Superficial thrombosis of the right cephalic vein    BLE DOPPLER 4/3 - Heterogeneous collection in the left groin suggesting hematoma. Enlarged left groin lymph node. Persistent thrombus in the left femoral, popliteal, tibial peroneal trunk and posterior tibial veins. No evidence of extension into the left common femoral vein. The right common femoral, femoral and popliteal veins appear patent. There is scarring in the right tibial peroneal trunk.    Patient is also s/p explant of left LE PTFE and fem-AT bypass with cryo vein.     Patient seen in recliner.   He is confused and complaining of pain in his buttock.  Daughter was then available and able to assist, repositioned to sitting.  Complains of pain int he left leg.   RN able to obtain pillow to sit on to off load for pain.     REVIEW OF SYSTEMS  Constitutional - No fever, No weight loss, +fatigue  HEENT - No eye pain, No visual disturbances, No difficulty hearing, No tinnitus, No vertigo, No neck pain  Respiratory - No cough, No wheezing, +shortness of breath  Cardiovascular - No chest pain, No palpitations  Gastrointestinal - No abdominal pain, No nausea, No vomiting, No diarrhea, No constipation  Genitourinary - No dysuria, No frequency, No hematuria, No incontinence  Neurological - No headaches, +memory loss, +loss of strength, +numbness, No tremors  Skin - No itching, No rashes, +lesions   Endocrine - No temperature intolerance  Musculoskeletal - +joint pain, +joint swelling, +muscle pain  Psychiatric - No depression, +anxiety    VITALS  T(C): 37 (21 @ 07:15), Max: 37 (21 @ 07:15)  HR: 109 (21 @ 07:15) (60 - 112)  BP: 173/79 (21 @ 07:15) (137/55 - 173/79)  RR: 20 (21 @ 07:15) (16 - 20)  SpO2: 94% (21 @ 07:15) (92% - 100%)  Wt(kg): --    PAST MEDICAL & SURGICAL HISTORY  DM (diabetes mellitus)    AV block, 1st degree    Arrhythmia    CKD (chronic kidney disease)    CAD (coronary artery disease)    HTN (hypertension)    VT (ventricular tachycardia)    RICHARDS (dyspnea on exertion)    Anemia    Risk factors for obstructive sleep apnea    ESRD on dialysis    Aortic stenosis    GI bleeding    H/O circumcision    H/O left knee surgery    H/O angioplasty    A-V fistula    H/O carotid endarterectomy    S/P TAVR (transcatheter aortic valve replacement)    History of loop recorder        SOCIAL HISTORY - as per documentation/history  Smoking - None  EtOH - None  Drugs - None    FUNCTIONAL HISTORY  Lives with spouse, 3 JESSICA  Independent with RW    CURRENT FUNCTIONAL STATUS  4/4  Speech Language Pathology Recommendations: 1. Rx advance to [facility] soft with thin liquids as tolerated2. STRICT aspiration precautions, if demonstrating overt s/s penetration/aspiration, d/c PO diet orders & downgrade to puree with nectar thick pending re-evaluation by this department3. Meds crushed in puree, as able 4. 1:1 assist for all meals given reduced cognition5. Upright for all PO/>30 minutes following6. Small bites/sips, slow rate7. Oral care8. SLP to follow     Sit-Stand Transfer Training  Transfer Training Sit-to-Stand Transfer: 2 person assist;  full weight-bearing   rolling walker;  moderate assist (50% patient effort)  Transfer Training Stand-to-Sit Transfer: 2 person assist;  full weight-bearing   rolling walker;  moderate assist (50% patient effort)  Sit-to-Stand Transfer Training Transfer Safety Analysis: decreased strength;  pain    Gait Training  Gait Trainin person assist;  full weight-bearing   rolling walker;  chair to commode;  moderate assist (50% patient effort);  nonverbal cues (demo/gestures);  verbal cues  Gait Analysis: 3-point gait   crouch;  shuffling;  pain;  decreased strength;  impaired balance    Therapeutic Exercise  Therapeutic Exercise Detail: Pt performed bilateral UE therex while semifowler in bed for AROM for shoulder flexion, elbow flex/ext, pronation/supination, wrist flex/ext, gross grasp and digit extension 5 reps 2 sets. Pt performed therex with pink sponge and red foam tubing for wrist and hand for gross grasp/digit extension, wrist flex/ext and pronation/supination to promote muscle strength, endurance, bilateral integration, bilateral coordination, crossing midline, dexterity and fine motor skills.ROM therex performed to maintain muscle integrity, promote joint mobility, prevent atrophy, prevent/decrease chance of contracture and to facilitate participation in ADLs. Pt and family bedside educated in proper form and encouraged to take rest breaks between sets.     Neuromuscular Re-education  Neuromuscular Re-education Detail: Pt performed neuro exercises while semifowler in bed, back supported +cues reaching across midline, reaching foward/upward and reaching laterally. Pt performed grasp and release exercises with directional changes to promote visual scanning and attention. Neuro exercises/activity performed to promote AROM, hand eye coordination, bilateral integration, crossing midline and to facilitate participation with ADLs. Patient educated on energy conservation techniques and encouraged to take frequent rest breaks. Pt tolerated well but fatigued toward end of session.     FAMILY HISTORY   No pertinent family history in first degree relatives    Family history of cancer (Grandparent)    Family history of lung cancer (Grandparent)    Family history of premature CAD    FH: type 2 diabetes        RECENT LABS - Reviewed  CBC Full  -  ( 2021 06:06 )  WBC Count : 11.35 K/uL  RBC Count : 3.21 M/uL  Hemoglobin : 9.6 g/dL  Hematocrit : 28.7 %  Platelet Count - Automated : 519 K/uL  Mean Cell Volume : 89.4 fl  Mean Cell Hemoglobin : 29.9 pg  Mean Cell Hemoglobin Concentration : 33.4 gm/dL  Auto Neutrophil # : 10.85 K/uL  Auto Lymphocyte # : 0.30 K/uL  Auto Monocyte # : 0.10 K/uL  Auto Eosinophil # : 0.00 K/uL  Auto Basophil # : 0.10 K/uL  Auto Neutrophil % : 95.6 %  Auto Lymphocyte % : 2.6 %  Auto Monocyte % : 0.9 %  Auto Eosinophil % : 0.0 %  Auto Basophil % : 0.9 %    04-05    137  |  98  |  69.0<H>  ----------------------------<  110<H>  4.4   |  25.0  |  5.09<H>    Ca    8.2<L>      2021 06:06  Phos  4.1     04-05  Mg     2.1     04-05          ALLERGIES  Plavix (Hives)  Toprol-XL (Rash)      MEDICATIONS   acetaminophen   Tablet .. 650 milliGRAM(s) Oral every 6 hours  artificial tears (preservative free) Ophthalmic Solution 1 Drop(s) Both EYES two times a day PRN  ceFAZolin   IVPB 1000 milliGRAM(s) IV Intermittent every 8 hours  dextrose 40% Gel 15 Gram(s) Oral once  dextrose 5%. 1000 milliLiter(s) IV Continuous <Continuous>  dextrose 5%. 1000 milliLiter(s) IV Continuous <Continuous>  dextrose 50% Injectable 25 Gram(s) IV Push once  dextrose 50% Injectable 12.5 Gram(s) IV Push once  dextrose 50% Injectable 25 Gram(s) IV Push once  diltiazem    milliGRAM(s) Oral daily  DULoxetine 60 milliGRAM(s) Oral daily  epoetin juan-epbx (RETACRIT) Injectable 06639 Unit(s) IV Push <User Schedule>  glucagon  Injectable 1 milliGRAM(s) IntraMuscular once  heparin   Injectable 5000 Unit(s) SubCutaneous every 8 hours  hydrALAZINE 25 milliGRAM(s) Oral two times a day  insulin lispro (ADMELOG) corrective regimen sliding scale   SubCutaneous three times a day before meals  pantoprazole  Injectable 40 milliGRAM(s) IV Push every 12 hours  polyethylene glycol 3350 17 Gram(s) Oral daily  senna 2 Tablet(s) Oral at bedtime  tamsulosin 0.4 milliGRAM(s) Oral at bedtime  torsemide 20 milliGRAM(s) Oral <User Schedule>  traMADol 25 milliGRAM(s) Oral every 4 hours PRN      ----------------------------------------------------------------------------------------  PHYSICAL EXAM  Constitutional - NAD, Uncomfortable  HEENT - NCAT, EOMI  Neck - Supple, No limited ROM  Chest - Increased work of breathing due to pain, No wheezing  Cardiovascular - S1S2   Abdomen - Soft   Extremities - Left LE ACE wrapped, Erythematous foot, 3rd toe necrotic  Neurologic Exam -                    Cognitive - AAO to self, SSH and      Communication - Expressive deficits     Motor -                      LEFT    UE - ShAB 4/5, EF 4/5, EE 4/5,  5/5                    RIGHT UE - ShAB 4/5, EF 4/5, EE 4/5,  5/5                    LEFT    LE - HF 1/5, KE 1/5, DF 05/5                     RIGHT LE - HF 2/5, KE 2/5, DF 1/5      Sensory - decreased to the left foot  Psychiatric - Mood anxious/Confused  ----------------------------------------------------------------------------------------  ASSESSMENT/PLAN  86yMale with functional deficits after prolonged hospitalization related to PAD and anemia  Left LE PAD s/p PTFE and fem-AT bypass with cryo vein - ACE wrap, WBAT  Cellulitis - Ancef  CHF - Demadex  PAFIB/HTN - Cardizem, Hydralazine  GI Bleed - Protonix  RF - HD  Pain - Tylenol, Cymbalta, Tramadol, Consider Neurontin 100mg HS  DVT PPX - SCDs, Heparin  Rehab - Discussed at length today and on Friday goals for AR --> wheelchair mobility and transfers for the goal of optimizing function at  level for DC HOME in 2-3 weeks.  Given patient's medical complexity and need for 24 hour nursing and daily physician monitoring, recommend ACUTE inpatient rehabilitation for the functional deficits consisting of 3 hours of therapy/day & 24 hour RN/daily PMR physician for comorbid medical management. Patient will be able to tolerate 3 hours a day.    Will continue to follow. Functional progress will determine ongoing rehab dispo recommendations, which may change.    Continue bedside therapy as well as OOB throughout the day with mobilization by staff to maintain cardiopulmonary function and prevention of secondary complications related to debility.     Discussed with rehab team and SW.

## 2021-04-05 NOTE — PHARMACOTHERAPY INTERVENTION NOTE - COMMENTS
Recommended cefazolin 1 g q24h for HD dosing.
Called [facility] for medication list. Per patient's home meds, patient on nifedipine XL 90mg daily.  Recommended to discontinue duplicate order of nifedipine IR 60mg

## 2021-04-05 NOTE — PROGRESS NOTE ADULT - SUBJECTIVE AND OBJECTIVE BOX
Subjective/Overnight event: Evaluated at bedside found laying down in no distress AM. Patient expressed LLE pain has improved. Mild erythema noticed around surgical wound sites. Tolerating, having regular bowel function. Voiding adequate volumes.  Denies any fever/chills, nausea/vomiting, dizziness, SOB chest pain, constipation/diarrhea, weakness, numbness.       MEDICATIONS  (STANDING):  acetaminophen   Tablet .. 650 milliGRAM(s) Oral every 6 hours  ceFAZolin   IVPB 1000 milliGRAM(s) IV Intermittent every 8 hours  dextrose 40% Gel 15 Gram(s) Oral once  dextrose 5%. 1000 milliLiter(s) (50 mL/Hr) IV Continuous <Continuous>  dextrose 5%. 1000 milliLiter(s) (100 mL/Hr) IV Continuous <Continuous>  dextrose 50% Injectable 25 Gram(s) IV Push once  dextrose 50% Injectable 12.5 Gram(s) IV Push once  dextrose 50% Injectable 25 Gram(s) IV Push once  diltiazem    milliGRAM(s) Oral daily  DULoxetine 60 milliGRAM(s) Oral daily  epoetin juan-epbx (RETACRIT) Injectable 85924 Unit(s) IV Push <User Schedule>  glucagon  Injectable 1 milliGRAM(s) IntraMuscular once  heparin   Injectable 5000 Unit(s) SubCutaneous every 8 hours  hydrALAZINE 25 milliGRAM(s) Oral two times a day  insulin lispro (ADMELOG) corrective regimen sliding scale   SubCutaneous three times a day before meals  pantoprazole  Injectable 40 milliGRAM(s) IV Push every 12 hours  polyethylene glycol 3350 17 Gram(s) Oral daily  senna 2 Tablet(s) Oral at bedtime  tamsulosin 0.4 milliGRAM(s) Oral at bedtime  torsemide 20 milliGRAM(s) Oral <User Schedule>    MEDICATIONS  (PRN):  artificial tears (preservative free) Ophthalmic Solution 1 Drop(s) Both EYES two times a day PRN Dry Eyes  traMADol 25 milliGRAM(s) Oral every 4 hours PRN Severe Pain (7 - 10)      Vital Signs:  Vital Signs Last 24 Hrs  T(C): 37 (05 Apr 2021 07:15), Max: 37 (05 Apr 2021 07:15)  T(F): 98.6 (05 Apr 2021 07:15), Max: 98.6 (05 Apr 2021 07:15)  HR: 109 (05 Apr 2021 07:15) (60 - 112)  BP: 173/79 (05 Apr 2021 07:15) (137/55 - 173/79)  BP(mean): --  RR: 20 (05 Apr 2021 07:15) (16 - 20)  SpO2: 94% (05 Apr 2021 07:15) (92% - 100%)    CAPILLARY BLOOD GLUCOSE  POCT Blood Glucose.: 123 mg/dL (05 Apr 2021 08:03)  POCT Blood Glucose.: 104 mg/dL (04 Apr 2021 19:20)  POCT Blood Glucose.: 159 mg/dL (04 Apr 2021 12:15)      I&O's Detail    04 Apr 2021 07:01  -  05 Apr 2021 07:00  --------------------------------------------------------  IN:    PRBCs (Packed Red Blood Cells): 324 mL  Total IN: 324 mL    OUT:    Voided (mL): 300 mL  Total OUT: 300 mL    Total NET: 24 mL    Physical Examination:  General: alert, oriented x 3 in no acute distress   CV: normal S1/S2, RRR, no gallops, murmurs or rubs   Lungs: clear to auscultation b/l, symmetric chest movement, no rales, rhonchi or wheezing   Abdomen: soft, depressible, non-tender, non-distended, +BS  EXT: RLE: sensation intact, limited ROM due to movment surgical wound sites with mild surrounding erythema    Labs:    04-05    137  |  98  |  69.0<H>  ----------------------------<  110<H>  4.4   |  25.0  |  5.09<H>    Ca    8.2<L>      05 Apr 2021 06:06  Phos  4.1     04-05  Mg     2.1     04-05                              9.6    11.35 )-----------( 519      ( 05 Apr 2021 06:06 )             28.7     PT/INR - ( 04 Apr 2021 06:40 )   PT: 15.7 sec;   INR: 1.37 ratio         PTT - ( 04 Apr 2021 06:40 )  PTT:33.8 sec

## 2021-04-05 NOTE — PROGRESS NOTE ADULT - ASSESSMENT
Assessment  s/p LLE bypass/revision  postop DVT off A due to GIB  s/p TAVR normal EF mod MR  PAF not AC candidate  leadless Micra PPM  HTYN  ESRD on HD      Rec  cont current meds  if recurrent tachy episodes consider switch cardizem to labetolol  will follow

## 2021-04-05 NOTE — PROGRESS NOTE ADULT - SUBJECTIVE AND OBJECTIVE BOX
[facility] - [facility]                                   [address] [place] [address]                                                      PHONE: [phone]                                                         FAX: [phone]  [email or web address]  ---------------------------------------------------------------------------------------------------------------------------------    Overnight events/patient complaints: tele revealed underlying AV dissociation with occ tracking/tachy      Plavix (Hives)  Toprol-XL (Rash)    MEDICATIONS  (STANDING):  acetaminophen   Tablet .. 650 milliGRAM(s) Oral every 6 hours  ceFAZolin   IVPB 1000 milliGRAM(s) IV Intermittent every 8 hours  dextrose 40% Gel 15 Gram(s) Oral once  dextrose 5%. 1000 milliLiter(s) (50 mL/Hr) IV Continuous <Continuous>  dextrose 5%. 1000 milliLiter(s) (100 mL/Hr) IV Continuous <Continuous>  dextrose 50% Injectable 25 Gram(s) IV Push once  dextrose 50% Injectable 12.5 Gram(s) IV Push once  dextrose 50% Injectable 25 Gram(s) IV Push once  diltiazem    milliGRAM(s) Oral daily  DULoxetine 60 milliGRAM(s) Oral daily  epoetin juan-epbx (RETACRIT) Injectable 18661 Unit(s) IV Push <User Schedule>  glucagon  Injectable 1 milliGRAM(s) IntraMuscular once  heparin   Injectable 5000 Unit(s) SubCutaneous every 8 hours  hydrALAZINE 25 milliGRAM(s) Oral two times a day  insulin lispro (ADMELOG) corrective regimen sliding scale   SubCutaneous three times a day before meals  pantoprazole  Injectable 40 milliGRAM(s) IV Push every 12 hours  polyethylene glycol 3350 17 Gram(s) Oral daily  senna 2 Tablet(s) Oral at bedtime  tamsulosin 0.4 milliGRAM(s) Oral at bedtime  torsemide 20 milliGRAM(s) Oral <User Schedule>    MEDICATIONS  (PRN):  artificial tears (preservative free) Ophthalmic Solution 1 Drop(s) Both EYES two times a day PRN Dry Eyes  traMADol 25 milliGRAM(s) Oral every 4 hours PRN Severe Pain (7 - 10)      Vital Signs Last 24 Hrs  T(C): 37 (2021 07:15), Max: 37 (2021 07:15)  T(F): 98.6 (2021 07:15), Max: 98.6 (2021 07:15)  HR: 109 (2021 07:15) (60 - 112)  BP: 173/79 (2021 07:15) (137/55 - 173/79)  BP(mean): --  RR: 20 (2021 07:15) (16 - 20)  SpO2: 94% (2021 07:15) (92% - 100%)  Daily     Daily Weight in k.2 (2021 05:01)  ICU Vital Signs Last 24 Hrs  CHRISTIANO ELIZABETH  I&O's Detail    2021 07:01  -  2021 07:00  --------------------------------------------------------  IN:    PRBCs (Packed Red Blood Cells): 324 mL  Total IN: 324 mL    OUT:    Voided (mL): 300 mL  Total OUT: 300 mL    Total NET: 24 mL        I&O's Summary    2021 07:01  -  2021 07:00  --------------------------------------------------------  IN: 324 mL / OUT: 300 mL / NET: 24 mL      Drug Dosing Weight  CHRISTIANO GLENN      PHYSICAL EXAM:  General: Appears well developed, well nourished alert and cooperative.  HEENT: Head; normocephalic, atraumatic.  Eyes: Pupils reactive, cornea wnl.  Neck: Supple, no nodes adenopathy, no NVD or carotid bruit or thyromegaly.  CARDIOVASCULAR: Normal S1 and S2, No murmur, rub, gallop or lift.   LUNGS: No rales, rhonchi or wheeze. Normal breath sounds bilaterally.  ABDOMEN: Soft, nontender without mass or organomegaly. bowel sounds normoactive.  EXTREMITIES: No clubbing, cyanosis or edema. Distal pulses wnl.   SKIN: warm and dry with normal turgor.  NEURO: Alert/oriented x 3/normal motor exam. No pathologic reflexes.    PSYCH: normal affect.        LABS:                        9.6    11.35 )-----------( 519      ( 2021 06:06 )             28.7     04-05    137  |  98  |  69.0<H>  ----------------------------<  110<H>  4.4   |  25.0  |  5.09<H>    Ca    8.2<L>      2021 06:06  Phos  4.1     04-05  Mg     2.1     04-05      CHRISTIANO PROETTA      PT/INR - ( 2021 06:40 )   PT: 15.7 sec;   INR: 1.37 ratio         PTT - ( 2021 06:40 )  PTT:33.8 sec      RADIOLOGY & ADDITIONAL STUDIES:    INTERPRETATION OF TELEMETRY (personally reviewed):    ECG:    ECHO:< from: TTE Echo Complete w/o Contrast w/ Doppler (21 @ 11:36) >    Summary:   1. Moderately enlarged left atrium.   2. There is mild concentric left ventricular hypertrophy.   3. Left ventricular ejection fraction, by visual estimation, is 60 to 65%.   4. Grade II diastolic dysfunction. Elevated mean left atrial pressure.   5. Mildly enlarged right atrium.   6. Mildly enlarged right ventricle. Mildly reduced RV systolic function.   7. Mild mitral stenosis. Moderate mitral valve regurgitation. Elevated mean gradient likely due to volume overload. Repeat study after diuresis to erevaluate mitral valve. If clinically indicated.   8. 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 Well seated valve. Acceptable gradients. No regurgitation.   9. Mild tricuspid regurgitation.  10. Estimated pulmonary artery systolic pressure is 78 mmHg - severe pulmonary hypertension.  11. There is no evidence of pericardial effusion.    MD Rohith, RPVI Electronically signed on 3/14/2021 at 6:02:57 PM            *** Final ***              < end of copied text >

## 2021-04-05 NOTE — PROGRESS NOTE ADULT - ASSESSMENT
85 y/o M s/p explant of LLE PTFE, fem-AT bypass with cryo vein. On saturday patient had scant amount of blood in stool. Eliquis was held and SQH was started.  Patient receive 2 units of blood yesterday, Hgb responded appropriately from 8.2 to 11. Patient most recent bowel movements non-bloody will continue to monitor and follow up GI recommendations.    Plan:   - Follow up GI recommmendation  - Continue on SQH  - Daily dressing change   -Discuss with  LYNNE placement for once patient in medically stable

## 2021-04-05 NOTE — PROGRESS NOTE ADULT - SUBJECTIVE AND OBJECTIVE BOX
Chief Complaint: This is a 86y old man patient being seen in follow-up consultation for symptomatic anemia.    HPI / 24H events:  Patient seeing and evaluated at bedside, reporting no complains, tolerating oral intake                                                            .      ROS: A 14-point review of systems was reviewed and was otherwise negative save what was reported in the HPI.    PAST MEDICAL/SURGICAL HISTORY:  DM (diabetes mellitus)  - resolved    AV block, 1st degree    Arrhythmia    CAD (coronary artery disease)    HTN (hypertension)    VT (ventricular tachycardia)    RICHARDS (dyspnea on exertion)    Anemia    Risk factors for obstructive sleep apnea    ESRD on dialysis  HD on Mondays and Fridays    Aortic stenosis  - s/p valve replacement    GI bleeding  due to ulcerated polyps and colonic AVMs    H/O circumcision  at  age  65    H/O left knee surgery    H/O angioplasty  2013,  no  intervention    A-V fistula  left arm 5/2017    H/O carotid endarterectomy  Right    S/P TAVR (transcatheter aortic valve replacement)    History of loop recorder      MEDICATIONS  (STANDING):  acetaminophen   Tablet .. 650 milliGRAM(s) Oral every 6 hours  ceFAZolin   IVPB 1000 milliGRAM(s) IV Intermittent every 24 hours  dextrose 40% Gel 15 Gram(s) Oral once  dextrose 5%. 1000 milliLiter(s) (50 mL/Hr) IV Continuous <Continuous>  dextrose 5%. 1000 milliLiter(s) (100 mL/Hr) IV Continuous <Continuous>  dextrose 50% Injectable 25 Gram(s) IV Push once  dextrose 50% Injectable 12.5 Gram(s) IV Push once  dextrose 50% Injectable 25 Gram(s) IV Push once  diltiazem    milliGRAM(s) Oral daily  DULoxetine 60 milliGRAM(s) Oral daily  epoetin juan-epbx (RETACRIT) Injectable 92438 Unit(s) IV Push <User Schedule>  glucagon  Injectable 1 milliGRAM(s) IntraMuscular once  heparin   Injectable 5000 Unit(s) SubCutaneous every 8 hours  hydrALAZINE 25 milliGRAM(s) Oral two times a day  insulin lispro (ADMELOG) corrective regimen sliding scale   SubCutaneous three times a day before meals  pantoprazole  Injectable 40 milliGRAM(s) IV Push every 12 hours  polyethylene glycol 3350 17 Gram(s) Oral daily  senna 2 Tablet(s) Oral at bedtime  tamsulosin 0.4 milliGRAM(s) Oral at bedtime  torsemide 20 milliGRAM(s) Oral <User Schedule>    MEDICATIONS  (PRN):  artificial tears (preservative free) Ophthalmic Solution 1 Drop(s) Both EYES two times a day PRN Dry Eyes  traMADol 25 milliGRAM(s) Oral every 4 hours PRN Severe Pain (7 - 10)    Plavix (Hives)  Toprol-XL (Rash)    T(C): 37 (04-05-21 @ 07:15), Max: 37 (04-05-21 @ 07:15)  HR: 109 (04-05-21 @ 07:15) (60 - 112)  BP: 173/79 (04-05-21 @ 07:15) (137/55 - 173/79)  RR: 20 (04-05-21 @ 07:15) (16 - 20)  SpO2: 94% (04-05-21 @ 07:15) (92% - 100%)    I&O's Summary    04 Apr 2021 07:01  -  05 Apr 2021 07:00  --------------------------------------------------------  IN: 324 mL / OUT: 300 mL / NET: 24 mL      PHYSICAL EXAM:  Constitutional: Elderly looking man, in no apparent distress  Eyes: Sclerae anicteric, conjunctivae normal  ENMT: Mucus membranes moist, no oropharyngeal thrush noted  Respiratory: Breathing nonlabored; clear to auscultation  Cardiovascular: Regular rate and rhythm  Gastrointestinal: Soft, nontender, nondistended, normoactive bowel sounds.  Extremities: No clubbing, cyanosis or edema  Neurological: Alert and oriented to person, place and time; no asterixis  Skin: No jaundice  Musculoskeletal: No significant peripheral atrophy  Psychiatric: Affect and mood appropriate                   9.6    11.35 )-----------( 519      ( 04-05 @ 06:06 )             28.7                7.8    8.81  )-----------( 433      ( 04-04 @ 06:40 )             24.6                8.3    9.88  )-----------( 430      ( 04-03 @ 17:57 )             25.4                8.9    11.94 )-----------( 438      ( 04-03 @ 12:44 )             27.6                8.6    10.19 )-----------( 432      ( 04-03 @ 06:21 )             26.4       04-05    137  |  98  |  69.0<H>  ----------------------------<  110<H>  4.4   |  25.0  |  5.09<H>    Ca    8.2<L>      05 Apr 2021 06:06  Phos  4.1     04-05  Mg     2.1     04-05            PT/INR - ( 04 Apr 2021 06:40 )   PT: 15.7 sec;   INR: 1.37 ratio         PTT - ( 04 Apr 2021 06:40 )  PTT:33.8 sec     Chief Complaint: This is a 86y old man patient being seen in follow-up consultation for symptomatic anemia.    HPI / 24H events:  Patient seeing and evaluated at bedside, reporting no complains, tolerating oral intake, hemoglobin uptrend now 9.6. Denies nausea, vomiting, abdominal pain, chest pain, shortness of breath, hematemesis, hematochezia, melena.                                                        ROS: A 14-point review of systems was reviewed and was otherwise negative save what was reported in the HPI.    PAST MEDICAL/SURGICAL HISTORY:  DM (diabetes mellitus)  - resolved    AV block, 1st degree    Arrhythmia    CAD (coronary artery disease)    HTN (hypertension)    VT (ventricular tachycardia)    RICHARDS (dyspnea on exertion)    Anemia    Risk factors for obstructive sleep apnea    ESRD on dialysis  HD on Mondays and Fridays    Aortic stenosis  - s/p valve replacement    GI bleeding  due to ulcerated polyps and colonic AVMs    H/O circumcision  at  age  65    H/O left knee surgery    H/O angioplasty  2013,  no  intervention    A-V fistula  left arm 5/2017    H/O carotid endarterectomy  Right    S/P TAVR (transcatheter aortic valve replacement)    History of loop recorder      MEDICATIONS  (STANDING):  acetaminophen   Tablet .. 650 milliGRAM(s) Oral every 6 hours  ceFAZolin   IVPB 1000 milliGRAM(s) IV Intermittent every 24 hours  dextrose 40% Gel 15 Gram(s) Oral once  dextrose 5%. 1000 milliLiter(s) (50 mL/Hr) IV Continuous <Continuous>  dextrose 5%. 1000 milliLiter(s) (100 mL/Hr) IV Continuous <Continuous>  dextrose 50% Injectable 25 Gram(s) IV Push once  dextrose 50% Injectable 12.5 Gram(s) IV Push once  dextrose 50% Injectable 25 Gram(s) IV Push once  diltiazem    milliGRAM(s) Oral daily  DULoxetine 60 milliGRAM(s) Oral daily  epoetin juan-epbx (RETACRIT) Injectable 79033 Unit(s) IV Push <User Schedule>  glucagon  Injectable 1 milliGRAM(s) IntraMuscular once  heparin   Injectable 5000 Unit(s) SubCutaneous every 8 hours  hydrALAZINE 25 milliGRAM(s) Oral two times a day  insulin lispro (ADMELOG) corrective regimen sliding scale   SubCutaneous three times a day before meals  pantoprazole  Injectable 40 milliGRAM(s) IV Push every 12 hours  polyethylene glycol 3350 17 Gram(s) Oral daily  senna 2 Tablet(s) Oral at bedtime  tamsulosin 0.4 milliGRAM(s) Oral at bedtime  torsemide 20 milliGRAM(s) Oral <User Schedule>    MEDICATIONS  (PRN):  artificial tears (preservative free) Ophthalmic Solution 1 Drop(s) Both EYES two times a day PRN Dry Eyes  traMADol 25 milliGRAM(s) Oral every 4 hours PRN Severe Pain (7 - 10)    Plavix (Hives)  Toprol-XL (Rash)    T(C): 37 (04-05-21 @ 07:15), Max: 37 (04-05-21 @ 07:15)  HR: 109 (04-05-21 @ 07:15) (60 - 112)  BP: 173/79 (04-05-21 @ 07:15) (137/55 - 173/79)  RR: 20 (04-05-21 @ 07:15) (16 - 20)  SpO2: 94% (04-05-21 @ 07:15) (92% - 100%)    I&O's Summary    04 Apr 2021 07:01  -  05 Apr 2021 07:00  --------------------------------------------------------  IN: 324 mL / OUT: 300 mL / NET: 24 mL      PHYSICAL EXAM:  Constitutional: Elderly looking man, in no apparent distress  Eyes: Sclerae anicteric, conjunctivae normal  ENMT: Mucus membranes moist, no oropharyngeal thrush noted  Respiratory: Breathing nonlabored; clear to auscultation  Cardiovascular: Regular rate and rhythm  Gastrointestinal: Soft, nontender, nondistended, normoactive bowel sounds.  Extremities: No clubbing, cyanosis or edema  Neurological: Alert and oriented to person, place and time; no asterixis  Skin: No jaundice  Musculoskeletal: No significant peripheral atrophy  Psychiatric: Affect and mood appropriate                   9.6    11.35 )-----------( 519      ( 04-05 @ 06:06 )             28.7                7.8    8.81  )-----------( 433      ( 04-04 @ 06:40 )             24.6                8.3    9.88  )-----------( 430      ( 04-03 @ 17:57 )             25.4                8.9    11.94 )-----------( 438      ( 04-03 @ 12:44 )             27.6                8.6    10.19 )-----------( 432      ( 04-03 @ 06:21 )             26.4       04-05    137  |  98  |  69.0<H>  ----------------------------<  110<H>  4.4   |  25.0  |  5.09<H>    Ca    8.2<L>      05 Apr 2021 06:06  Phos  4.1     04-05  Mg     2.1     04-05            PT/INR - ( 04 Apr 2021 06:40 )   PT: 15.7 sec;   INR: 1.37 ratio         PTT - ( 04 Apr 2021 06:40 )  PTT:33.8 sec

## 2021-04-05 NOTE — PROGRESS NOTE ADULT - SUBJECTIVE AND OBJECTIVE BOX
[facility] DIVISION OF KIDNEY DISEASES AND HYPERTENSION -- HEMODIALYSIS NOTE  --------------------------------------------------------------------------------  Chief Complaint: ESRD/Ongoing hemodialysis requirement    24 hour events/subjective: Tolerating diet,     Being evaluated for Acute Rehabilitation,     PAST HISTORY  --------------------------------------------------------------------------------  No significant changes to PMH, PSH, FHx, SHx, unless otherwise noted    ALLERGIES & MEDICATIONS  --------------------------------------------------------------------------------  Allergies    Plavix (Hives)  Toprol-XL (Rash)    Standing Inpatient Medications  acetaminophen   Tablet .. 650 milliGRAM(s) Oral every 6 hours  ceFAZolin   IVPB 1000 milliGRAM(s) IV Intermittent every 24 hours  dextrose 40% Gel 15 Gram(s) Oral once  dextrose 5%. 1000 milliLiter(s) IV Continuous <Continuous>  dextrose 5%. 1000 milliLiter(s) IV Continuous <Continuous>  dextrose 50% Injectable 25 Gram(s) IV Push once  dextrose 50% Injectable 12.5 Gram(s) IV Push once  dextrose 50% Injectable 25 Gram(s) IV Push once  diltiazem    milliGRAM(s) Oral daily  DULoxetine 60 milliGRAM(s) Oral daily  epoetin juan-epbx (RETACRIT) Injectable 85218 Unit(s) IV Push <User Schedule>  glucagon  Injectable 1 milliGRAM(s) IntraMuscular once  heparin   Injectable 5000 Unit(s) SubCutaneous every 8 hours  hydrALAZINE 25 milliGRAM(s) Oral two times a day  insulin lispro (ADMELOG) corrective regimen sliding scale   SubCutaneous three times a day before meals  pantoprazole  Injectable 40 milliGRAM(s) IV Push every 12 hours  polyethylene glycol 3350 17 Gram(s) Oral daily  senna 2 Tablet(s) Oral at bedtime  tamsulosin 0.4 milliGRAM(s) Oral at bedtime  torsemide 20 milliGRAM(s) Oral <User Schedule>    PRN Inpatient Medications  artificial tears (preservative free) Ophthalmic Solution 1 Drop(s) Both EYES two times a day PRN  traMADol 25 milliGRAM(s) Oral every 4 hours PRN    REVIEW OF SYSTEMS  --------------------------------------------------------------------------------  Gen: + weight changes, fatigue, No fevers/chills, weakness  Skin: No rashes  Head/Eyes/Ears/Mouth: No headache; Normal hearing; Normal vision w/o blurriness; No sinus pain/discomfort, sore throat  Respiratory: No dyspnea, cough, wheezing, hemoptysis  CV: No chest pain, PND, orthopnea  GI: No abdominal pain, diarrhea, constipation, nausea, vomiting, + melena, hematochezia  : No increased frequency, dysuria, hematuria, nocturia  MSK: No joint pain/swelling; no back pain; no edema  Neuro: No dizziness/lightheadedness, weakness, seizures, numbness, tingling  Heme: No easy bruising or bleeding  Endo: No heat/cold intolerance  Psych: No significant nervousness, ++ anxiety, stress, depression    All other systems were reviewed and are negative, except as noted.    VITALS/PHYSICAL EXAM  --------------------------------------------------------------------------------  T(C): 37 (04-05-21 @ 07:15), Max: 37 (04-05-21 @ 07:15)  HR: 109 (04-05-21 @ 07:15) (60 - 112)  BP: 173/79 (04-05-21 @ 07:15) (137/55 - 173/79)  RR: 20 (04-05-21 @ 07:15) (16 - 20)  SpO2: 94% (04-05-21 @ 07:15) (92% - 100%)    04-04-21 @ 07:01  -  04-05-21 @ 07:00  --------------------------------------------------------  IN: 324 mL / OUT: 300 mL / NET: 24 mL    04-05-21 @ 07:01  -  04-05-21 @ 12:53  --------------------------------------------------------  IN: 240 mL / OUT: 300 mL / NET: -60 mL    Physical Exam:  	Gen: NAD, well-appearing, Pale, Sallow,   	HEENT: PERRL, supple neck, clear oropharynx  	Pulm: Distant BS B/L  	CV: S1S2; no rub  	Back: No spinal or CVA tenderness; Trace  sacral edema  	Abd: +BS, soft, nontender/nondistended  	: No suprapubic tenderness  	UE: Warm, FROM, no clubbing, intact strength; no edema; no asterixis  	LE: Warm, FROM, no clubbing, intact strength; no edema  	Neuro: No focal deficits, poor  gait  	Psych: Flat  affect and mood  	Skin: Warm, without rashes  	Vascular access: AVF,     LABS/STUDIES  --------------------------------------------------------------------------------                9.6    11.35 >-----------<  519      [04-05-21 @ 06:06]              28.7     137  |  98  |  69.0  ----------------------------<  110      [04-05-21 @ 06:06]  4.4   |  25.0  |  5.09        Ca     8.2     [04-05-21 @ 06:06]      Mg     2.1     [04-05-21 @ 06:06]      Phos  4.1     [04-05-21 @ 06:06]    PT/INR: PT 15.7 , INR 1.37       [04-04-21 @ 06:40]  PTT: 33.8       [04-04-21 @ 06:40]    Iron 53, TIBC 266, %sat 20      [08-19-20 @ 06:32]  Ferritin 184      [08-19-20 @ 06:32]  PTH -- (Ca 9.6)      [08-19-20 @ 16:08]   91  Vitamin D (25OH) 26.4      [08-19-20 @ 16:08]  HbA1c 4.9      [08-09-18 @ 05:54]  TSH 2.16      [09-15-20 @ 02:01]

## 2021-04-05 NOTE — PROGRESS NOTE ADULT - ASSESSMENT
83 y/o M s/p explant of LLE PTFE, FA bypass with cryo vein. On Saturday patient had scant amount of blood in stool. Eliquis was held and SQ Heparin  was started.  Patient received 2 units of blood yesterday,     Hgb responded appropriately from 8.2 to 11 gms.,  Most recent bowel movements non-bloody will continue to monitor and follow,    HD BIW - On EPO,    Plan:     Cardiology, GI , Vascular F.U Reviewed,   - Daily dressing change     On Plavix, Monitor Hgb.,

## 2021-04-05 NOTE — PROGRESS NOTE ADULT - ATTENDING COMMENTS
Patient seen and examined  Now extubated and comfortable at bedside but remains confused  Left leg is warm, biphasic AT signal  Surgical sites are clean and without hematoma. Moderate LLE edema, compartments soft  -Continue therapeutic AC  -DC NG tube  -OOB to chair  -Care per ICU
85 yo M with a PMH of HTN, HLD, CAD, HFpEF, s/p Right CEA for Carotid artery disease, ESRD on HD 2d/week (M/Fri) via LUE fistula, s/p flecainide w/ ILR placed 6/2020, AS s/p TAVR, PAD with RLE revasc on 10/2020 on Plavix. He had thrombosed L CFA and had a Fem  to below knee pop bypass which ultimately represented with acute ischemia necessitating LLE bypass revision w explant of PTFE bypass , CFA to AT bypass with acute blood loss anemia. To ICU vent management and serial vascular exams  AAOx2, confused and interactive, GCS 14   PUL CTA   CV RRR, pacer interrogated and no issues  GI Benign  MS LLE doppler signal Biphasic, hyperemia decreased, warm and  tender to touch  WBC 20  PTT 71  Plan   1. Continue heparin gtt for LLE DVT and plavix for CAD / stents  2. Continue to monitor BG and control with ISS  3. Diet as tolerated   4. Stable to transfer to monitored bed with Serial vascula exams        code [id number]
Seen and examined  Remains confused but hemodynamically normal  All incisions are clean without evidence of infection or hematoma  LLE is well perfused  Worsening leukocytosis    -Continue IV heparin gtt  -Out of bed to chair  -Will continue to monitor leukocytosis off antibiotics
I have personally seen and examined the patient. Doing ok. No complaints except the leg discomfort. Will recommend to continue to hold the plavix and eliquis. Will follow up.

## 2021-04-05 NOTE — PROGRESS NOTE ADULT - NUTRITIONAL ASSESSMENT
This patient has been assessed with a concern for Malnutrition and has been determined to have a diagnosis/diagnoses of Severe protein-calorie malnutrition.    This patient is being managed with:   Diet DASH/TLC-  Sodium & Cholesterol Restricted  Dysphagia 1 Pureed Honey Consistency Fluid (DYS1HC)  Entered: Mar 27 2021 10:16AM    
This patient has been assessed with a concern for Malnutrition and has been determined to have a diagnosis/diagnoses of Severe protein-calorie malnutrition.    This patient is being managed with:   Diet DASH/TLC-  Sodium & Cholesterol Restricted  Dysphagia 1 Pureed Honey Consistency Fluid (DYS1HC)  Supplement Feeding Modality:  Oral  Ensure Pudding Cans or Servings Per Day:  3       Frequency:  Three Times a day  Entered: Mar 28 2021 11:18AM    
This patient has been assessed with a concern for Malnutrition and has been determined to have a diagnosis/diagnoses of Severe protein-calorie malnutrition.    This patient is being managed with:   Diet DASH/TLC-  Sodium & Cholesterol Restricted  Dysphagia 1 Pureed Honey Consistency Fluid (DYS1HC)  Supplement Feeding Modality:  Oral  Ensure Pudding Cans or Servings Per Day:  3       Frequency:  Three Times a day  Entered: Mar 28 2021 11:18AM    
This patient has been assessed with a concern for Malnutrition and has been determined to have a diagnosis/diagnoses of Moderate protein-calorie malnutrition.    This patient is being managed with:   Diet Renal Restrictions-  For patients receiving Renal Replacement - No Protein Restr No Conc K No Conc Phos Low Sodium  DASH/TLC {Sodium & Cholesterol Restricted} (DASH)  Supplement Feeding Modality:  Oral  Nepro Cans or Servings Per Day:  1       Frequency:  Three Times a day  Entered: Mar 16 2021  8:19AM    
This patient has been assessed with a concern for Malnutrition and has been determined to have a diagnosis/diagnoses of Severe protein-calorie malnutrition.    This patient is being managed with:   Diet DASH/TLC-  Sodium & Cholesterol Restricted  Dysphagia 1 Pureed Honey Consistency Fluid (DYS1HC)  Entered: Mar 27 2021 10:16AM    
This patient has been assessed with a concern for Malnutrition and has been determined to have a diagnosis/diagnoses of Severe protein-calorie malnutrition.    This patient is being managed with:   Diet Mechanical Soft-  Consistent Carbohydrate {Evening Snack} (CSTCHOSN)  DASH/TLC {Sodium & Cholesterol Restricted} (DASH)  1500mL Fluid Restriction ([id number])  For patients receiving Renal Replacement - No Protein Restr No Conc K No Conc Phos Low  Sodium (RENAL)  Supplement Feeding Modality:  Oral  Nepro Cans or Servings Per Day:  3       Frequency:  Daily  Entered: Apr 4 2021 10:18AM    
This patient has been assessed with a concern for Malnutrition and has been determined to have a diagnosis/diagnoses of Severe protein-calorie malnutrition.    This patient is being managed with:   Diet Renal Restrictions-  For patients receiving Renal Replacement - No Protein Restr No Conc K No Conc Phos Low Sodium  DASH/TLC {Sodium & Cholesterol Restricted} (DASH)  Supplement Feeding Modality:  Oral  Nepro Cans or Servings Per Day:  1       Frequency:  Three Times a day  Entered: Mar 16 2021  8:19AM    
This patient has been assessed with a concern for Malnutrition and has been determined to have a diagnosis/diagnoses of Moderate protein-calorie malnutrition.    This patient is being managed with:   Diet Renal Restrictions-  For patients receiving Renal Replacement - No Protein Restr No Conc K No Conc Phos Low Sodium  DASH/TLC {Sodium & Cholesterol Restricted} (DASH)  Supplement Feeding Modality:  Oral  Nepro Cans or Servings Per Day:  1       Frequency:  Three Times a day  Entered: Mar 16 2021  8:19AM    
This patient has been assessed with a concern for Malnutrition and has been determined to have a diagnosis/diagnoses of Moderate protein-calorie malnutrition.    This patient is being managed with:   Diet Renal Restrictions-  For patients receiving Renal Replacement - No Protein Restr No Conc K No Conc Phos Low Sodium  DASH/TLC {Sodium & Cholesterol Restricted} (DASH)  Supplement Feeding Modality:  Oral  Nepro Cans or Servings Per Day:  1       Frequency:  Three Times a day  Entered: Mar 16 2021  8:19AM    
This patient has been assessed with a concern for Malnutrition and has been determined to have a diagnosis/diagnoses of Severe protein-calorie malnutrition.    This patient is being managed with:   Diet DASH/TLC-  Sodium & Cholesterol Restricted  Dysphagia 1 Pureed Honey Consistency Fluid (DYS1HC)  Supplement Feeding Modality:  Oral  Ensure Pudding Cans or Servings Per Day:  3       Frequency:  Three Times a day  Entered: Mar 28 2021 11:18AM    
This patient has been assessed with a concern for Malnutrition and has been determined to have a diagnosis/diagnoses of Severe protein-calorie malnutrition.    This patient is being managed with:   Diet NPO-  Except Medications  Entered: Mar 25 2021  7:28PM    
This patient has been assessed with a concern for Malnutrition and has been determined to have a diagnosis/diagnoses of Severe protein-calorie malnutrition.    This patient is being managed with:   Diet NPO-  Except Medications  Entered: Mar 25 2021  7:28PM

## 2021-04-06 ENCOUNTER — TRANSCRIPTION ENCOUNTER (OUTPATIENT)
Age: 86
End: 2021-04-06

## 2021-04-06 LAB
ANION GAP SERPL CALC-SCNC: 12 MMOL/L — SIGNIFICANT CHANGE UP (ref 5–17)
BASOPHILS # BLD AUTO: 0.05 K/UL — SIGNIFICANT CHANGE UP (ref 0–0.2)
BASOPHILS NFR BLD AUTO: 0.5 % — SIGNIFICANT CHANGE UP (ref 0–2)
BUN SERPL-MCNC: 41 MG/DL — HIGH (ref 8–20)
CALCIUM SERPL-MCNC: 8.3 MG/DL — LOW (ref 8.6–10.2)
CHLORIDE SERPL-SCNC: 97 MMOL/L — LOW (ref 98–107)
CO2 SERPL-SCNC: 27 MMOL/L — SIGNIFICANT CHANGE UP (ref 22–29)
CREAT SERPL-MCNC: 3.74 MG/DL — HIGH (ref 0.5–1.3)
EOSINOPHIL # BLD AUTO: 0.02 K/UL — SIGNIFICANT CHANGE UP (ref 0–0.5)
EOSINOPHIL NFR BLD AUTO: 0.2 % — SIGNIFICANT CHANGE UP (ref 0–6)
GLUCOSE BLDC GLUCOMTR-MCNC: 109 MG/DL — HIGH (ref 70–99)
GLUCOSE BLDC GLUCOMTR-MCNC: 124 MG/DL — HIGH (ref 70–99)
GLUCOSE BLDC GLUCOMTR-MCNC: 128 MG/DL — HIGH (ref 70–99)
GLUCOSE BLDC GLUCOMTR-MCNC: 99 MG/DL — SIGNIFICANT CHANGE UP (ref 70–99)
GLUCOSE SERPL-MCNC: 106 MG/DL — HIGH (ref 70–99)
HCT VFR BLD CALC: 27.3 % — LOW (ref 39–50)
HGB BLD-MCNC: 8.8 G/DL — LOW (ref 13–17)
IMM GRANULOCYTES NFR BLD AUTO: 0.9 % — SIGNIFICANT CHANGE UP (ref 0–1.5)
LYMPHOCYTES # BLD AUTO: 0.41 K/UL — LOW (ref 1–3.3)
LYMPHOCYTES # BLD AUTO: 4.1 % — LOW (ref 13–44)
MAGNESIUM SERPL-MCNC: 1.9 MG/DL — SIGNIFICANT CHANGE UP (ref 1.6–2.6)
MCHC RBC-ENTMCNC: 29.6 PG — SIGNIFICANT CHANGE UP (ref 27–34)
MCHC RBC-ENTMCNC: 32.2 GM/DL — SIGNIFICANT CHANGE UP (ref 32–36)
MCV RBC AUTO: 91.9 FL — SIGNIFICANT CHANGE UP (ref 80–100)
MONOCYTES # BLD AUTO: 0.63 K/UL — SIGNIFICANT CHANGE UP (ref 0–0.9)
MONOCYTES NFR BLD AUTO: 6.3 % — SIGNIFICANT CHANGE UP (ref 2–14)
NEUTROPHILS # BLD AUTO: 8.78 K/UL — HIGH (ref 1.8–7.4)
NEUTROPHILS NFR BLD AUTO: 88 % — HIGH (ref 43–77)
PHOSPHATE SERPL-MCNC: 3 MG/DL — SIGNIFICANT CHANGE UP (ref 2.4–4.7)
PLATELET # BLD AUTO: 468 K/UL — HIGH (ref 150–400)
POTASSIUM SERPL-MCNC: 3.8 MMOL/L — SIGNIFICANT CHANGE UP (ref 3.5–5.3)
POTASSIUM SERPL-SCNC: 3.8 MMOL/L — SIGNIFICANT CHANGE UP (ref 3.5–5.3)
RBC # BLD: 2.97 M/UL — LOW (ref 4.2–5.8)
RBC # FLD: 16.4 % — HIGH (ref 10.3–14.5)
SODIUM SERPL-SCNC: 136 MMOL/L — SIGNIFICANT CHANGE UP (ref 135–145)
WBC # BLD: 9.98 K/UL — SIGNIFICANT CHANGE UP (ref 3.8–10.5)
WBC # FLD AUTO: 9.98 K/UL — SIGNIFICANT CHANGE UP (ref 3.8–10.5)

## 2021-04-06 PROCEDURE — 99233 SBSQ HOSP IP/OBS HIGH 50: CPT

## 2021-04-06 PROCEDURE — 73562 X-RAY EXAM OF KNEE 3: CPT | Mod: 26,LT

## 2021-04-06 RX ADMIN — SENNA PLUS 2 TABLET(S): 8.6 TABLET ORAL at 21:46

## 2021-04-06 RX ADMIN — PANTOPRAZOLE SODIUM 40 MILLIGRAM(S): 20 TABLET, DELAYED RELEASE ORAL at 05:46

## 2021-04-06 RX ADMIN — Medication 100 MILLIGRAM(S): at 16:24

## 2021-04-06 RX ADMIN — Medication 650 MILLIGRAM(S): at 05:45

## 2021-04-06 RX ADMIN — Medication 25 MILLIGRAM(S): at 16:24

## 2021-04-06 RX ADMIN — HEPARIN SODIUM 5000 UNIT(S): 5000 INJECTION INTRAVENOUS; SUBCUTANEOUS at 05:45

## 2021-04-06 RX ADMIN — Medication 650 MILLIGRAM(S): at 11:31

## 2021-04-06 RX ADMIN — TRAMADOL HYDROCHLORIDE 25 MILLIGRAM(S): 50 TABLET ORAL at 21:46

## 2021-04-06 RX ADMIN — HEPARIN SODIUM 5000 UNIT(S): 5000 INJECTION INTRAVENOUS; SUBCUTANEOUS at 21:46

## 2021-04-06 RX ADMIN — TRAMADOL HYDROCHLORIDE 25 MILLIGRAM(S): 50 TABLET ORAL at 22:20

## 2021-04-06 RX ADMIN — Medication 25 MILLIGRAM(S): at 05:44

## 2021-04-06 RX ADMIN — POLYETHYLENE GLYCOL 3350 17 GRAM(S): 17 POWDER, FOR SOLUTION ORAL at 11:35

## 2021-04-06 RX ADMIN — PANTOPRAZOLE SODIUM 40 MILLIGRAM(S): 20 TABLET, DELAYED RELEASE ORAL at 16:25

## 2021-04-06 RX ADMIN — Medication 20 MILLIGRAM(S): at 16:24

## 2021-04-06 RX ADMIN — HEPARIN SODIUM 5000 UNIT(S): 5000 INJECTION INTRAVENOUS; SUBCUTANEOUS at 12:41

## 2021-04-06 RX ADMIN — DULOXETINE HYDROCHLORIDE 60 MILLIGRAM(S): 30 CAPSULE, DELAYED RELEASE ORAL at 11:32

## 2021-04-06 RX ADMIN — Medication 650 MILLIGRAM(S): at 16:24

## 2021-04-06 RX ADMIN — Medication 240 MILLIGRAM(S): at 05:45

## 2021-04-06 RX ADMIN — Medication 650 MILLIGRAM(S): at 23:36

## 2021-04-06 NOTE — DISCHARGE NOTE PROVIDER - PROVIDER TOKENS
PROVIDER:[TOKEN:[[id number]]] PROVIDER:[TOKEN:[[id number]]],PROVIDER:[TOKEN:[[id number]]],PROVIDER:[TOKEN:[[id number]]],PROVIDER:[TOKEN:[[id number]]],PROVIDER:[TOKEN:[[id number]]]

## 2021-04-06 NOTE — PROGRESS NOTE ADULT - ASSESSMENT
1) ESRD on HD  2) MBD of CKD  3) Anemia of CKD  4) Vol HTN    Continue HD MF schedule  UF as tolerated with HD; continue Torsemide on non HD days  AMY with HD  Trend phosphorus and calcium

## 2021-04-06 NOTE — CONSULT NOTE ADULT - SUBJECTIVE AND OBJECTIVE BOX
Pt Name: CHRISTIANO ELIZABETH    MRN: [id number]      Patient is a 86y Male admitted to the hospital for multiple medical conditions including dvt of the LLE, GI bleed and recent vascular bypass surgery of the LLE. As per daughter, pt has been seen by Dr. Champagne in the office multiple times for "drainage of the left knee", last was performed "many months ago." She states that they wanted to make sure the left knee was okay. Denies recent falls/trauma. Pts exam limited secondary to dementia.    REVIEW OF SYSTEMS    General: Alert, responsive, in NAD    Skin/Breast: + healed superficial abrasions of left knee  	  Ophthalmologic: No visual changes. No redness.   	  ENMT:	No discharge. No swelling.    Respiratory and Thorax: No difficulty breathing. No cough.  	   Cardiovascular:	No chest pain. No palpitations.    Gastrointestinal:	 No abdominal pain. No diarrhea.     Genitourinary: No dysuria. No bleeding.    Musculoskeletal: SEE HPI.    Neurological: No sensory or motor changes.     Psychiatric: No anxiety or depression.    Hematology/Lymphatics: No swelling.    Endocrine: No Hx of diabetes.    ROS is otherwise negative.    PAST MEDICAL & SURGICAL HISTORY:  PAST MEDICAL & SURGICAL HISTORY:  DM (diabetes mellitus)  - resolved    AV block, 1st degree    Arrhythmia    CAD (coronary artery disease)    HTN (hypertension)    VT (ventricular tachycardia)    RICHARDS (dyspnea on exertion)    Anemia    Risk factors for obstructive sleep apnea    ESRD on dialysis  HD on  and     Aortic stenosis  - s/p valve replacement    GI bleeding  due to ulcerated polyps and colonic AVMs    H/O circumcision  at  age  65    H/O left knee surgery    H/O angioplasty  ,  no  intervention    A-V fistula  left arm 2017    H/O carotid endarterectomy  Right    S/P TAVR (transcatheter aortic valve replacement)    History of loop recorder        Allergies: Plavix (Hives)  Toprol-XL (Rash)      Medications: acetaminophen   Tablet .. 650 milliGRAM(s) Oral every 6 hours  artificial tears (preservative free) Ophthalmic Solution 1 Drop(s) Both EYES two times a day PRN  ceFAZolin   IVPB 1000 milliGRAM(s) IV Intermittent every 24 hours  dextrose 40% Gel 15 Gram(s) Oral once  dextrose 5%. 1000 milliLiter(s) IV Continuous <Continuous>  dextrose 5%. 1000 milliLiter(s) IV Continuous <Continuous>  dextrose 50% Injectable 25 Gram(s) IV Push once  dextrose 50% Injectable 12.5 Gram(s) IV Push once  dextrose 50% Injectable 25 Gram(s) IV Push once  diltiazem    milliGRAM(s) Oral daily  DULoxetine 60 milliGRAM(s) Oral daily  epoetin juan-epbx (RETACRIT) Injectable 99979 Unit(s) SubCutaneous <User Schedule>  epoetin juan-epbx (RETACRIT) Injectable 34555 Unit(s) IV Push <User Schedule>  glucagon  Injectable 1 milliGRAM(s) IntraMuscular once  heparin   Injectable 5000 Unit(s) SubCutaneous every 8 hours  hydrALAZINE 25 milliGRAM(s) Oral two times a day  insulin lispro (ADMELOG) corrective regimen sliding scale   SubCutaneous three times a day before meals  pantoprazole  Injectable 40 milliGRAM(s) IV Push every 12 hours  polyethylene glycol 3350 17 Gram(s) Oral daily  senna 2 Tablet(s) Oral at bedtime  tamsulosin 0.4 milliGRAM(s) Oral at bedtime  torsemide 20 milliGRAM(s) Oral <User Schedule>  traMADol 25 milliGRAM(s) Oral every 4 hours PRN      FAMILY HISTORY:  Family history of cancer (Grandparent)    Family history of lung cancer (Grandparent)    Family history of premature CAD    FH: type 2 diabetes    : non-contributory    Social History: Denies ETOH abuse.    Ambulation: Walking independently [ ] With Cane [ ] With Walker [ x ]  Bedbound [ ]                           8.8    9.98  )-----------( 468      ( 2021 05:51 )             27.3       04-06    136  |  97<L>  |  41.0<H>  ----------------------------<  106<H>  3.8   |  27.0  |  3.74<H>    Ca    8.3<L>      2021 05:51  Phos  3.0     04-06  Mg     1.9     04-06        Vital Signs Last 24 Hrs  T(C): 36.4 (2021 16:23), Max: 36.8 (2021 05:37)  T(F): 97.6 (2021 16:23), Max: 98.3 (2021 05:37)  HR: 65 (2021 20:02) (63 - 106)  BP: 146/60 (2021 20:02) (136/59 - 186/70)  BP(mean): --  RR: 19 (2021 20:02) (18 - 22)  SpO2: 97% (2021 20:02) (94% - 97%)    Daily     Daily Weight in k.5 (2021 04:00)      PHYSICAL EXAM: Exam limited secondary to patients dementia.      Appearance: Alert, in no acute distress.    Left lower extremity: +ACE wrap (applied by vascular team) removed to assess LLE. + dressings of the proximal lateral thigh, medial knee and lateral lower leg noted. +healed superficial abrasions of the left anterior knee. + erythema of the left proximal lateral thigh noted surrounding dressing and + erythema of the distal LE. + calf TTP. Passive ROM approx 25-60 degrees with no objective signs of pain. Left knee with NO significant swelling/warmth/erythema noted. + distal DP pulse via doppler. Motor exam limited secondary to patient not following command appropriately and states that he doesn't want to." ACE wrap reapplied to the LLE.    Imaging Studies: Images reviewed with Dr. Champagne    A/P:  Pt is a  86y Male with extensive DVT of the LLE and recent vascular bypass procedure performed on the LLE c/o LLE pain. Left knee without significant swelling/warmth/erythema. Xrays show osteoarthritis with no acute fracture. Patient afebrile with wbc wnl on last labs drawn. Physical exam limited secondary to patients dementia but no objective signs of pain noted with passive ROM. Low suspicion for acute injury or septic left knee.    PLAN d/w Dr. Dr. Champagne:   * No immediate orthopedic surgical intervention necessary at this time  * Pain control as clinically indicated  * Follow up with Dr. Champagne in the office as needed  * Continue plan as per primary team  * Ortho stable

## 2021-04-06 NOTE — PROGRESS NOTE ADULT - ASSESSMENT
Patient with recurrent rectal bleeding and consequent anemia.  Currently off DAPT and AC.  Recent EGD and colonoscopy.  No need to repeat at this time.  The choice of restarting DAPT and/or AC deferred to primary team and other consultants.  He is at significantly increased risk for recurrent GI bleeding.  That risk must be weighed against the benefits for his severe cardiovascular disease.  Recommend continuation of pantoprazole, which can likely be transitioned to PO.  Will continue to follow.

## 2021-04-06 NOTE — DISCHARGE NOTE PROVIDER - NSDCCPCAREPLAN_GEN_ALL_CORE_FT
PRINCIPAL DISCHARGE DIAGNOSIS  Diagnosis: PAD (peripheral artery disease)  Assessment and Plan of Treatment:       SECONDARY DISCHARGE DIAGNOSES  Diagnosis: Gastrointestinal hemorrhage, unspecified gastrointestinal hemorrhage type  Assessment and Plan of Treatment:

## 2021-04-06 NOTE — PROGRESS NOTE ADULT - SUBJECTIVE AND OBJECTIVE BOX
Vascular Surgery follow up    No acute events overnight   No grossly bloody bowel movements, therapeutic anticoagulants and antiplatelets still being held  H/H with slight decline  Dressing change performed the the left le, incisions dry and intact.  Left lateral thigh incision with some noted improvement of the reactive erythema  left medial calf incision with hematoma to the dependent portion  PTFE with palpable pulse   left PT with biphasic signal  Edema improved to the lle  Patient with 25degree of flexion contraction to the left knee     - Will continue to follow GI recommendation   - Will hold off on PRBC transfusion for now due to patient being asymptomatic  - continue Sub-Q heparin administration  - Compression and elevation to the LLE  - if Therapeutic A/C cannot be resumed, will discuss possible need for IVC filter with Attending

## 2021-04-06 NOTE — DISCHARGE NOTE PROVIDER - NSDCFUADDINST_GEN_ALL_CORE_FT
ORTHOPEDIC FOLLOW UP RECOMMENDATIONS: Follow up with Dr. Champagne in the office as needed. ORTHOPEDIC FOLLOW UP RECOMMENDATIONS: Follow up with Dr. Champagne in the office as needed for Leftknee contracture.     VASCULAR:   WOUND CARE: Apply island dressings to incisions daily. Keep leg elevated. ACE wraps for comfort/edema.  BATHING: Please do not submerge wound underwater. You may shower and/or sponge bathe.   DIET: Return to soft mechanical diet.   NOTIFY YOUR SURGEON IF: You have any bleeding that does not stop, any pus draining from your wound(s), any fever (over 100.4 F) or chills, GI bleeding (vomiting blood, bloody bowel movements), persistent nausea/vomiting, persistent diarrhea, cool extremity, color changes in extremity, or if your pain is not controlled on your discharge pain medications.  FOLLOW-UP: Please follow up with your primary care physician, Dr. Albrecht (1-2 weeks), GI (1-2 weeks), your cardiologist  (2 weeks), nephrology per schedule. Call for appointment upon discharge.    MEDICATIONS: you may resume your PLAVIX, however should remain on heparin subq. Notify your surgeon for any signs of bleeding. You should take keflex for 2 more days for skin erythema around medial incision ( you have been improving with the ancef in the hospital but will complete 5 days total of abx).     ORTHOPEDIC FOLLOW UP RECOMMENDATIONS: Follow up with Dr. Champagne in the office as needed for Leftknee contracture.     VASCULAR:   WOUND CARE: Apply island dressings to incisions daily. Keep leg elevated. ACE wraps for comfort/edema.  BATHING: Please do not submerge wound underwater. You may shower and/or sponge bathe.   DIET: Return to soft mechanical diet.   NOTIFY YOUR SURGEON IF: You have any bleeding that does not stop, any pus draining from your wound(s), any fever (over 100.4 F) or chills, GI bleeding (vomiting blood, bloody bowel movements), persistent nausea/vomiting, persistent diarrhea, cool extremity, color changes in extremity, or if your pain is not controlled on your discharge pain medications.  FOLLOW-UP: Please follow up with your primary care physician, Dr. Albrecht (1-2 weeks), GI (1-2 weeks), your cardiologist  (2 weeks), nephrology per schedule. Call for appointment upon discharge.    MEDICATIONS: you may resume your PLAVIX, however should remain on heparin subq. Notify your surgeon for any signs of bleeding. You should take keflex for 2 more days for skin erythema around medial incision ( you have been improving with the ancef in the hospital but will complete 5 days total of abx).    HD per schedule (monday and friday and prn)

## 2021-04-06 NOTE — DISCHARGE NOTE PROVIDER - NSDCFUSCHEDAPPT_GEN_ALL_CORE_FT
GLENN, CHRISTIANO ; 04/08/2021 ; [facility]  PROCRYSTAL, CHRISTIANO ; 04/19/2021 ; [facility] [address]  PROCHRISTIANO ROJAS ; 04/22/2021 ; [facility]  PROCRYSTAL, CHRISTIANO ; 05/06/2021 ; [facility]  PROCRYSTAL, CHRISTIANO ; 05/24/2021 ; [facility] [address]  CHRISTIANO ELIZABETH ; 06/28/2021 ; [facility] [address]

## 2021-04-06 NOTE — CONSULT NOTE ADULT - CONSULT REQUESTED DATE/TIME
14-Mar-2021 10:57
25-Mar-2021 20:00
14-Mar-2021 07:26
14-Mar-2021 11:30
26-Mar-2021
06-Apr-2021 20:51
13-Mar-2021 23:06
05-Apr-2021 08:24

## 2021-04-06 NOTE — DISCHARGE NOTE PROVIDER - HOSPITAL COURSE
3/13 presented to [facility] with GI bleed, as well as LLE rest pain. Patient was admitted to the medical service, on 3/15 went for EGD, multiple gastric AVM's were cauterized. When patient was brennon from GI, medical, cardiac standpoint, went for LLE angiogram on 3/18, found occlusion of L SFA. He was planned for a LLE fem-pop bypass, had bypass performed on 3/20. 3/22 patient was a RRT for AMS, fever. Work-up revealed pleural effusions and L femoral DVT. He was started on abx for presumed pna and hep gtt for DVT. Patient began to show signs of malperfusion with coolness to touch, mottling of skin to LLE. He was planned for revision of LLE bypass. Went to OR on 3/25 for LLE bypass revision w explant of PTFE bypass , CFA to AT bypass w cryovein performed. Intra-op patient with 1500 ml of blood loss requiring 8 unit PRBC, 4 unit FFP, 1 donor unit plt.   During procedure, pt lost function of PPM with period of asystole requiring transcutaneous pacing.  Cardiology consulted and pacemaker interrogated in the OR.  PT taken to PACU post op and SICU consulted.  Patient went to SICU post-op.  3/27 patient was extubated. 3/28 was downgraded from SICU to floor. Rest of hospital course uneventful. He worked with PT, was evaluated by PM&R and approved for acute rehab. The patient remained on heparin sq while in the hospital for tx of dvt (his plavix was held). Upon discharge, approved by CHALINO and Dr. Albrecht to resume plavix. He will remain on heparin sq while at rehab for dvt tx and ppx. He is not a candidate for IVC filter. Upon discharge, pain is well controlled, tolerating diet, remains HD stable, no clinical signs/symtoms of GI bleed, LLE remains warm and well perfused, he has a strong LLE palpable graft pulse and dopplerable signals. Per attending, he is medically stable for discharge to [facility].      3/13 presented to [facility] with GI bleed, as well as LLE rest pain. Patient was admitted to the medical service, on 3/15 went for EGD, multiple gastric AVM's were cauterized. When patient was brennon from GI, medical, cardiac standpoint, went for LLE angiogram on 3/18, found occlusion of L SFA. He was planned for a LLE fem-pop bypass, had bypass performed on 3/20. 3/22 patient was a RRT for AMS, fever. Work-up revealed pleural effusions and L femoral DVT. He was started on abx for presumed pna and hep gtt for DVT. Patient began to show signs of malperfusion with coolness to touch, mottling of skin to LLE. He was planned for revision of LLE bypass. Went to OR on 3/25 for LLE bypass revision w explant of PTFE bypass , CFA to AT bypass w cryovein performed. Intra-op patient with 1500 ml of blood loss requiring 8 unit PRBC, 4 unit FFP, 1 donor unit plt.   During procedure, pt lost function of PPM with period of asystole requiring transcutaneous pacing.  Cardiology consulted and pacemaker interrogated in the OR.  PT taken to PACU post op and SICU consulted.  Patient went to SICU post-op.  3/27 patient was extubated. 3/28 was downgraded from SICU to floor. Rest of hospital course uneventful. He worked with PT, was evaluated by PM&R and approved for acute rehab. The patient remained on heparin sq while in the hospital for tx of dvt (his plavix was held). Upon discharge, approved by CHALINO and Dr. Albrecht to resume plavix. He will remain on heparin sq while at rehab for dvt tx and ppx. He is not a candidate for IVC filter. Upon discharge, pain is well controlled, tolerating diet, remains HD stable, no clinical signs/symtoms of GI bleed, LLE remains warm and well perfused, he has a strong LLE palpable graft pulse and dopplerable signals. Per attending, he is medically stable for discharge to [facility].   Maintain   HD epr schedule ( monday and friday and as needed)

## 2021-04-06 NOTE — DISCHARGE NOTE PROVIDER - NSDCMRMEDTOKEN_GEN_ALL_CORE_FT
aspirin 81 mg oral tablet, chewable: 1 tab(s) orally once a day  atorvastatin 80 mg oral tablet: 1 tab(s) orally once a day  Cymbalta 60 mg oral delayed release capsule: 1 cap(s) orally once a day  gabapentin 100 mg oral tablet: 1 tab(s) orally 2 times a day, As Needed  hydrALAZINE 25 mg oral tablet: 2 tab(s) orally 2 times a day  isosorbide mononitrate 30 mg oral tablet, extended release: 1 tab(s) orally once a day  Nephro-Thomsa oral tablet: 1 tab(s) orally once a day  NIFEdipine 90 mg oral tablet, extended release: 1 tab(s) orally once a day   Plavix 75 mg oral tablet: 1 tab(s) orally once a day  torsemide 20 mg oral tablet: 1 tab(s) orally once a day on non dialysis days   acetaminophen 325 mg oral tablet: 2 tab(s) orally every 6 hours  atorvastatin 80 mg oral tablet: 1 tab(s) orally once a day  Cymbalta 60 mg oral delayed release capsule: 1 cap(s) orally once a day  dilTIAZem 240 mg/24 hours oral capsule, extended release: 1 cap(s) orally once a day  gabapentin 100 mg oral tablet: 1 tab(s) orally 2 times a day, As Needed  heparin: 5000 unit(s) subcutaneous every 8 hours  hydrALAZINE 25 mg oral tablet: 2 tab(s) orally 2 times a day  Keflex 500 mg oral capsule: 1 cap(s) orally 2 times a day   Nephro-Thomas oral tablet: 1 tab(s) orally once a day  ocular lubricant ophthalmic solution: 1 drop(s) to each affected eye 2 times a day, As needed, Dry Eyes  pantoprazole 40 mg oral delayed release tablet: 1 tab(s) orally every 12 hours  Plavix 75 mg oral tablet: 1 tab(s) orally once a day  polyethylene glycol 3350 oral powder for reconstitution: 17 gram(s) orally once a day  senna oral tablet: 2 tab(s) orally once a day (at bedtime)  tamsulosin 0.4 mg oral capsule: 1 cap(s) orally once a day (at bedtime)  torsemide 20 mg oral tablet: 1 tab(s) orally once a day on non dialysis days

## 2021-04-06 NOTE — DISCHARGE NOTE PROVIDER - CARE PROVIDER_API CALL
Mango Champagne)  Orthopaedic Surgery  [address] [facility], [facility] [address]  [place] [address]  Phone: [phone]  Fax: [phone]  Follow Up Time:    Mango Champagne (MD)  Orthopaedic Surgery  [address] [facility]  [place] [address]  Phone: [phone]  Fax: [phone]  Follow Up Time:     Siva Winston (MD)  Surgery; Vascular Surgery  [address] [facility], [address] [place] [address]  Phone: [phone]  Fax: [phone]  Follow Up Time:     Erinn Contreras (DO)  Gastroenterology  [address] [facility], [address]  [place] [address]  Phone: [phone]  Fax: [phone]  Follow Up Time:     Bay Avendano)  Internal Medicine; Nephrology  [address] [place] [address]  Phone: [phone]  Fax: [phone]  Follow Up Time:     Jalyn Snyder)  Internal Medicine  [address] [place] [id number]  Phone: [phone]  Fax: [phone]  Follow Up Time:

## 2021-04-06 NOTE — CONSULT NOTE ADULT - REASON FOR ADMISSION
symptomatic anemia

## 2021-04-06 NOTE — PROGRESS NOTE ADULT - SUBJECTIVE AND OBJECTIVE BOX
Chief Complaint: This is an 86y old man patient being seen in follow-up consultation for rectal bleeding.    HPI / 24H events:  Patient denies any further episodes of rectal bleeding.  Voices no GI complaints.    ROS: A 14-point review of systems was reviewed and was otherwise negative save what was reported in the HPI.    PAST MEDICAL/SURGICAL HISTORY:  DM (diabetes mellitus)  - resolved  AV block, 1st degree  Arrhythmia  CAD (coronary artery disease)  HTN (hypertension)  VT (ventricular tachycardia)  RICHARDS (dyspnea on exertion)  Anemia  Risk factors for obstructive sleep apnea  ESRD on dialysis  HD on Mondays and Fridays  Aortic stenosis  - s/p valve replacement  GI bleeding  due to ulcerated polyps and colonic AVMs  H/O circumcision  at  age  65  H/O left knee surgery  H/O angioplasty  2013,  no  intervention  A-V fistula  left arm 5/2017  H/O carotid endarterectomy  Right  S/P TAVR (transcatheter aortic valve replacement)  History of loop recorder    MEDICATIONS  (STANDING):  acetaminophen   Tablet .. 650 milliGRAM(s) Oral every 6 hours  ceFAZolin   IVPB 1000 milliGRAM(s) IV Intermittent every 24 hours  dextrose 40% Gel 15 Gram(s) Oral once  dextrose 5%. 1000 milliLiter(s) (50 mL/Hr) IV Continuous <Continuous>  dextrose 5%. 1000 milliLiter(s) (100 mL/Hr) IV Continuous <Continuous>  dextrose 50% Injectable 25 Gram(s) IV Push once  dextrose 50% Injectable 12.5 Gram(s) IV Push once  dextrose 50% Injectable 25 Gram(s) IV Push once  diltiazem    milliGRAM(s) Oral daily  DULoxetine 60 milliGRAM(s) Oral daily  epoetin juan-epbx (RETACRIT) Injectable 44653 Unit(s) SubCutaneous <User Schedule>  epoetin juan-epbx (RETACRIT) Injectable 01738 Unit(s) IV Push <User Schedule>  glucagon  Injectable 1 milliGRAM(s) IntraMuscular once  heparin   Injectable 5000 Unit(s) SubCutaneous every 8 hours  hydrALAZINE 25 milliGRAM(s) Oral two times a day  insulin lispro (ADMELOG) corrective regimen sliding scale   SubCutaneous three times a day before meals  pantoprazole  Injectable 40 milliGRAM(s) IV Push every 12 hours  polyethylene glycol 3350 17 Gram(s) Oral daily  senna 2 Tablet(s) Oral at bedtime  tamsulosin 0.4 milliGRAM(s) Oral at bedtime  torsemide 20 milliGRAM(s) Oral <User Schedule>    MEDICATIONS  (PRN):  artificial tears (preservative free) Ophthalmic Solution 1 Drop(s) Both EYES two times a day PRN Dry Eyes  traMADol 25 milliGRAM(s) Oral every 4 hours PRN Severe Pain (7 - 10)    Plavix (Hives)  Toprol-XL (Rash)    T(C): 36.8 (04-06-21 @ 09:05), Max: 36.8 (04-06-21 @ 05:37)  HR: 63 (04-06-21 @ 09:05) (63 - 115)  BP: 136/59 (04-06-21 @ 09:05) (136/59 - 186/60)  RR: 18 (04-06-21 @ 09:05) (18 - 22)  SpO2: 94% (04-06-21 @ 09:05) (94% - 97%)    I&O's Summary    05 Apr 2021 07:01  -  06 Apr 2021 07:00  --------------------------------------------------------  IN: 240 mL / OUT: 1550 mL / NET: -1310 mL      PHYSICAL EXAM:  Constitutional: Well-developed, well-nourished, in no apparent distress  Eyes: Sclerae anicteric, conjunctivae normal  ENMT: Mucus membranes moist, no oropharyngeal thrush noted  Neck: No thyroid nodules appreciated, no significant cervical or supraclavicular lymphadenopathy  Respiratory: Breathing nonlabored; clear to auscultation  Cardiovascular: Regular rate and rhythm  Gastrointestinal: Soft, nontender, nondistended, normoactive bowel sounds; no hepatosplenomegaly appreciated; no rebound tenderness or involuntary guarding  Extremities: No clubbing or cyanosis  Neurological: Alert and oriented to person, place and time; no asterixis  Skin: No jaundice  Lymph Nodes: No significant lymphadenopathy  Musculoskeletal: No significant peripheral atrophy  Psychiatric: Affect and mood appropriate      LABS:               8.8    9.98  )-----------( 468      ( 04-06 @ 05:51 )             27.3                9.6    11.35 )-----------( 519      ( 04-05 @ 06:06 )             28.7                7.8    8.81  )-----------( 433      ( 04-04 @ 06:40 )             24.6                8.3    9.88  )-----------( 430      ( 04-03 @ 17:57 )             25.4       04-06    136  |  97<L>  |  41.0<H>  ----------------------------<  106<H>  3.8   |  27.0  |  3.74<H>    Ca    8.3<L>      06 Apr 2021 05:51  Phos  3.0     04-06  Mg     1.9     04-06

## 2021-04-06 NOTE — PROGRESS NOTE ADULT - SUBJECTIVE AND OBJECTIVE BOX
[facility] DIVISION OF KIDNEY DISEASES AND HYPERTENSION -- HEMODIALYSIS NOTE  --------------------------------------------------------------------------------  Chief Complaint: ESRD/Ongoing hemodialysis requirement    24 hour events/subjective:  s/p HD yesterday; tolerated well  pt seen and examined; no acute complaint        PAST HISTORY  --------------------------------------------------------------------------------  No significant changes to PMH, PSH, FHx, SHx, unless otherwise noted    ALLERGIES & MEDICATIONS  --------------------------------------------------------------------------------  Allergies    Plavix (Hives)  Toprol-XL (Rash)    Intolerances      Standing Inpatient Medications  acetaminophen   Tablet .. 650 milliGRAM(s) Oral every 6 hours  ceFAZolin   IVPB 1000 milliGRAM(s) IV Intermittent every 24 hours  dextrose 40% Gel 15 Gram(s) Oral once  dextrose 5%. 1000 milliLiter(s) IV Continuous <Continuous>  dextrose 5%. 1000 milliLiter(s) IV Continuous <Continuous>  dextrose 50% Injectable 25 Gram(s) IV Push once  dextrose 50% Injectable 12.5 Gram(s) IV Push once  dextrose 50% Injectable 25 Gram(s) IV Push once  diltiazem    milliGRAM(s) Oral daily  DULoxetine 60 milliGRAM(s) Oral daily  epoetin juan-epbx (RETACRIT) Injectable 35932 Unit(s) SubCutaneous <User Schedule>  epoetin juan-epbx (RETACRIT) Injectable 78689 Unit(s) IV Push <User Schedule>  glucagon  Injectable 1 milliGRAM(s) IntraMuscular once  heparin   Injectable 5000 Unit(s) SubCutaneous every 8 hours  hydrALAZINE 25 milliGRAM(s) Oral two times a day  insulin lispro (ADMELOG) corrective regimen sliding scale   SubCutaneous three times a day before meals  pantoprazole  Injectable 40 milliGRAM(s) IV Push every 12 hours  polyethylene glycol 3350 17 Gram(s) Oral daily  senna 2 Tablet(s) Oral at bedtime  tamsulosin 0.4 milliGRAM(s) Oral at bedtime  torsemide 20 milliGRAM(s) Oral <User Schedule>    PRN Inpatient Medications  artificial tears (preservative free) Ophthalmic Solution 1 Drop(s) Both EYES two times a day PRN  traMADol 25 milliGRAM(s) Oral every 4 hours PRN      REVIEW OF SYSTEMS  --------------------------------------------------------------------------------  Gen: No weight changes, fatigue, fevers/chills, weakness  Skin: No rashes  Head/Eyes/Ears/Mouth: No headache  Respiratory: No dyspnea, cough,  CV: No chest pain, orthopnea  GI: No abdominal pain, diarrhea, constipation, nausea, vomiting,  MSK: No joint pain  Neuro: No dizziness/lightheadedness, weakness  Heme: No bleeding  Psych: No significant nervousness, anxiety, stress, depression    All other systems were reviewed and are negative, except as noted.    VITALS/PHYSICAL EXAM  --------------------------------------------------------------------------------  T(C): 36.8 (04-06-21 @ 09:05), Max: 36.9 (04-05-21 @ 12:58)  HR: 63 (04-06-21 @ 09:05) (61 - 115)  BP: 136/59 (04-06-21 @ 09:05) (136/59 - 186/60)  RR: 18 (04-06-21 @ 09:05) (18 - 22)  SpO2: 94% (04-06-21 @ 09:05) (94% - 97%)  Wt(kg): --        04-05-21 @ 07:01  -  04-06-21 @ 07:00  --------------------------------------------------------  IN: 240 mL / OUT: 1550 mL / NET: -1310 mL      Physical Exam:  	Gen: NAD,   	HEENT: PERRL, supple neck,  	Pulm: CTA B/L  	CV: RRR, S1S2; no rub  	Abd: +BS, soft, nontender  	UE: Warm, intact strength  	LE: Warm, no edema  	Neuro: No focal deficits  	Psych: Normal affect and mood  	Skin: Warm, pale  	Vascular access: BARBRA AV    LABS/STUDIES  --------------------------------------------------------------------------------              8.8    9.98  >-----------<  468      [04-06-21 @ 05:51]              27.3     136  |  97  |  41.0  ----------------------------<  106      [04-06-21 @ 05:51]  3.8   |  27.0  |  3.74        Ca     8.3     [04-06-21 @ 05:51]      Mg     1.9     [04-06-21 @ 05:51]      Phos  3.0     [04-06-21 @ 05:51]            Iron 53, TIBC 266, %sat 20      [08-19-20 @ 06:32]  Ferritin 184      [08-19-20 @ 06:32]  PTH -- (Ca 9.6)      [08-19-20 @ 16:08]   91  Vitamin D (25OH) 26.4      [08-19-20 @ 16:08]  HbA1c 4.9      [08-09-18 @ 05:54]  TSH 2.16      [09-15-20 @ 02:01]

## 2021-04-06 NOTE — DISCHARGE NOTE PROVIDER - DETAILS OF MALNUTRITION DIAGNOSIS/DIAGNOSES
This patient has been assessed with a concern for Malnutrition and was treated during this hospitalization for the following Nutrition diagnosis/diagnoses:     -  03/22/2021: Severe protein-calorie malnutrition   -  03/18/2021: Moderate protein-calorie malnutrition

## 2021-04-06 NOTE — PROGRESS NOTE ADULT - SUBJECTIVE AND OBJECTIVE BOX
Patient in bed, fatigued.  Limited participation, but appears less confused and restless.  Reports no pain.     REVIEW OF SYSTEMS  Constitutional - No fever,  +fatigue  Neurological - +loss of strength   Musculoskeletal - No joint pain, +joint swelling, No muscle pain    FUNCTIONAL PROGRESS  4/5  Sit-Stand Transfer Training  Sit-to-Stand Transfer Training Rehab Potential: good, to achieve stated therapy goals  Transfer Training Sit-to-Stand Transfer: rolling walker;  maximum assist (25% patient effort);  moderate assist (50% patient effort);  1 person assist  Transfer Training Stand-to-Sit Transfer: moderate assist (50% patient effort);  maximum assist (25% patient effort);  1 person assist;  rolling walker  Sit-to-Stand Transfer Training Transfer Safety Analysis: decreased weight-shifting ability;  decreased strength;  impaired balance    Gait Training  Gait Training Rehab Potential: good, to achieve stated therapy goals  Gait Training: maximum assist (25% patient effort);  1 person assist;  rolling walker;  4ft  Gait Analysis: decreased step length;  decreased stride length;  decreased strength;  impaired balance    VITALS  T(C): 36.8 (04-06-21 @ 09:05), Max: 36.9 (04-05-21 @ 12:58)  HR: 63 (04-06-21 @ 09:05) (61 - 115)  BP: 136/59 (04-06-21 @ 09:05) (136/59 - 186/60)  RR: 18 (04-06-21 @ 09:05) (18 - 22)  SpO2: 94% (04-06-21 @ 09:05) (94% - 97%)  Wt(kg): --    MEDICATIONS   acetaminophen   Tablet .. 650 milliGRAM(s) every 6 hours  artificial tears (preservative free) Ophthalmic Solution 1 Drop(s) two times a day PRN  ceFAZolin   IVPB 1000 milliGRAM(s) every 24 hours  dextrose 40% Gel 15 Gram(s) once  dextrose 5%. 1000 milliLiter(s) <Continuous>  dextrose 5%. 1000 milliLiter(s) <Continuous>  dextrose 50% Injectable 25 Gram(s) once  dextrose 50% Injectable 12.5 Gram(s) once  dextrose 50% Injectable 25 Gram(s) once  diltiazem    milliGRAM(s) daily  DULoxetine 60 milliGRAM(s) daily  epoetin juan-epbx (RETACRIT) Injectable 79864 Unit(s) <User Schedule>  epoetin juan-epbx (RETACRIT) Injectable 34959 Unit(s) <User Schedule>  glucagon  Injectable 1 milliGRAM(s) once  heparin   Injectable 5000 Unit(s) every 8 hours  hydrALAZINE 25 milliGRAM(s) two times a day  insulin lispro (ADMELOG) corrective regimen sliding scale   three times a day before meals  pantoprazole  Injectable 40 milliGRAM(s) every 12 hours  polyethylene glycol 3350 17 Gram(s) daily  senna 2 Tablet(s) at bedtime  tamsulosin 0.4 milliGRAM(s) at bedtime  torsemide 20 milliGRAM(s) <User Schedule>  traMADol 25 milliGRAM(s) every 4 hours PRN      RECENT LABS/IMAGING                          8.8    9.98  )-----------( 468      ( 06 Apr 2021 05:51 )             27.3     04-06    136  |  97<L>  |  41.0<H>  ----------------------------<  106<H>  3.8   |  27.0  |  3.74<H>    Ca    8.3<L>      06 Apr 2021 05:51  Phos  3.0     04-06  Mg     1.9     04-06                  CTA CHEST 3/22 - 1. No central, lobar or segmental PE. Limited evaluation of subsegmental branches. 2. Small to moderate bilateral pleural effusions with associated atelectasis. Pulmonary edema. 3. Scattered areas of tree-in-bud nodularity likely reflects impacted distal airways versus infectious/inflammatory small airways disease.    HEAD CT 3/22 - No CT evidence of acute intracranial abnormality.    BLE DOPPLER 3/23 - Acute left lower extremity deep venous thrombosis, above and below the knee. The left femoral-popliteal bypass graft could not be identified on this ultrasound examination.    BUE DOPPLER 3/29 - No evidence of right upper extremity deep venous thrombosis. Superficial thrombosis of the right cephalic vein    BLE DOPPLER 4/3 - Heterogeneous collection in the left groin suggesting hematoma. Enlarged left groin lymph node. Persistent thrombus in the left femoral, popliteal, tibial peroneal trunk and posterior tibial veins. No evidence of extension into the left common femoral vein. The right common femoral, femoral and popliteal veins appear patent. There is scarring in the right tibial peroneal trunk.      ----------------------------------------------------------------------------------------  PHYSICAL EXAM  Constitutional - NAD, Uncomfortable  HEENT - NCAT, EOMI  Neck - Supple, No limited ROM  Chest - Increased work of breathing due to pain, No wheezing  Cardiovascular - S1S2   Abdomen - Soft   Extremities - Left LE ACE wrapped, Erythematous foot, 3rd toe necrotic  Neurologic Exam -                    Cognitive - AAO to self, [facility] and 2021     Communication - Expressive deficits     Motor -                      LEFT    UE - ShAB 4/5, EF 4/5, EE 4/5,  5/5                    RIGHT UE - ShAB 4/5, EF 4/5, EE 4/5,  5/5                    LEFT    LE - HF 1/5, KE 1/5, DF 05/5                     RIGHT LE - HF 2/5, KE 2/5, DF 1/5      Sensory - decreased to the left foot  Psychiatric - Mood anxious/Confused  ----------------------------------------------------------------------------------------  ASSESSMENT/PLAN  86yMale with functional deficits after prolonged hospitalization related to PAD and anemia  Left LE PAD s/p PTFE and fem-AT bypass with cryo vein - ACE wrap, WBAT  Cellulitis - Ancef  CHF - Demadex  PAFIB/HTN - Cardizem, Hydralazine  GI Bleed - Protonix  RF - HD  Pain - Tylenol, Cymbalta, Tramadol, Consider Neurontin 100mg HS  DVT PPX - SCDs, Heparin  Rehab - Given patient's medical complexity and need for 24 hour nursing and daily physician monitoring, continue to recommend for AR. Discussed at length yesterday with daughter that goals for AR --> wheelchair mobility and transfers for the goal of optimizing function at  level for DC HOME in 2-3 weeks.      Will continue to follow. Functional progress will determine ongoing rehab dispo recommendations, which may change.    Continue bedside therapy as well as OOB throughout the day with mobilization by staff to maintain cardiopulmonary function and prevention of secondary complications related to debility.     Discussed with rehab team.

## 2021-04-06 NOTE — PROGRESS NOTE ADULT - SUBJECTIVE AND OBJECTIVE BOX
[facility]                              [address] [address]                                                 PHONE: [phone]                                                 FAX: [phone]  -----------------------------------------------------------------------------------------------  Pt seen and examined. FU for  shortness of breath      Overnight events/Complaints: Pt feels better. No complains. Brief tachycardia yesterday but overall rates controlled. BP stable.    Vital Signs Last 24 Hrs  T(C): 36.8 (06 Apr 2021 09:05), Max: 36.9 (05 Apr 2021 12:58)  T(F): 98.3 (06 Apr 2021 09:05), Max: 98.5 (05 Apr 2021 12:58)  HR: 63 (06 Apr 2021 09:05) (61 - 115)  BP: 136/59 (06 Apr 2021 09:05) (136/59 - 186/60)  BP(mean): --  RR: 18 (06 Apr 2021 09:05) (18 - 22)  SpO2: 94% (06 Apr 2021 09:05) (94% - 97%)  I&O's Summary    05 Apr 2021 07:01  -  06 Apr 2021 07:00  --------------------------------------------------------  IN: 240 mL / OUT: 1550 mL / NET: -1310 mL    PHYSICAL EXAM:  Constitutional: Elderly male in bed  HEENT:     Head: Normocephalic and atraumatic  Neck: supple. No JVD  Cardiovascular: regular S1 S2  Respiratory: Lungs clear to auscultation; no crepitations, no wheeze  Gastrointestinal:  Soft, Non-tender, + BS	  Musculoskeletal: Normal range of motion. No edema  Skin: Warm and dry. No cyanosis . No diaphoresis.  Neurologic: Alert oriented to time place and person. Normal strength and no tremor.        LABS:                        8.8    9.98  )-----------( 468      ( 06 Apr 2021 05:51 )             27.3     04-06    136  |  97<L>  |  41.0<H>  ----------------------------<  106<H>  3.8   |  27.0  |  3.74<H>    Ca    8.3<L>      06 Apr 2021 05:51  Phos  3.0     04-06  Mg     1.9     04-06    RADIOLOGY & ADDITIONAL STUDIES: (reviewed)  CXR was independently visualized/reviewed  and demonstrated: improving infiltrates    CARDIOLOGY TESTING:(reviewed)     12 lead EKG independently visualized/reviewed  and demonstrated paced rhythm    ECHOCARDIOGRAM independently visualized/reviewed and demonstrated :   Summary:   1. Moderately enlarged left atrium.   2. There is mild concentric left ventricular hypertrophy.   3. Left ventricular ejection fraction, by visual estimation, is 60 to 65%.   4. Grade II diastolic dysfunction. Elevated mean left atrial pressure.   5. Mildly enlarged right atrium.   6. Mildly enlarged right ventricle. Mildly reduced RV systolic function.   7. Mild mitral stenosis. Moderate mitral valve regurgitation. Elevated mean gradient likely due to volume overload. Repeat study after diuresis to erevaluate mitral valve. If clinically indicated.   8. 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 Well seated valve. Acceptable gradients. No regurgitation.   9. Mild tricuspid regurgitation.  10. Estimated pulmonary artery systolic pressure is 78 mmHg - severe pulmonary hypertension.  11. There is no evidence of pericardial effusion.    Catheterization:  RIGHT LOWER EXTREMITY VESSELS: Right superficial femoral: There was a 90 %  stenosis. Right peroneal: There was a 100 % stenosis.  COMPLICATIONS: No complications occurred during the cath lab visit.  Prepared and signed by  Siva Albrecht MD    TELEMETRY independently visualized/reviewed and demonstrated : AF with paced rhythm    MEDICATIONS:(reviewed)  MEDICATIONS  (STANDING):  acetaminophen   Tablet .. 650 milliGRAM(s) Oral every 6 hours  ceFAZolin   IVPB 1000 milliGRAM(s) IV Intermittent every 24 hours  dextrose 40% Gel 15 Gram(s) Oral once  dextrose 5%. 1000 milliLiter(s) (50 mL/Hr) IV Continuous <Continuous>  dextrose 5%. 1000 milliLiter(s) (100 mL/Hr) IV Continuous <Continuous>  dextrose 50% Injectable 25 Gram(s) IV Push once  dextrose 50% Injectable 12.5 Gram(s) IV Push once  dextrose 50% Injectable 25 Gram(s) IV Push once  diltiazem    milliGRAM(s) Oral daily  DULoxetine 60 milliGRAM(s) Oral daily  epoetin juan-epbx (RETACRIT) Injectable 57177 Unit(s) SubCutaneous <User Schedule>  epoetin juan-epbx (RETACRIT) Injectable 71103 Unit(s) IV Push <User Schedule>  glucagon  Injectable 1 milliGRAM(s) IntraMuscular once  heparin   Injectable 5000 Unit(s) SubCutaneous every 8 hours  hydrALAZINE 25 milliGRAM(s) Oral two times a day  insulin lispro (ADMELOG) corrective regimen sliding scale   SubCutaneous three times a day before meals  pantoprazole  Injectable 40 milliGRAM(s) IV Push every 12 hours  polyethylene glycol 3350 17 Gram(s) Oral daily  senna 2 Tablet(s) Oral at bedtime  tamsulosin 0.4 milliGRAM(s) Oral at bedtime  torsemide 20 milliGRAM(s) Oral <User Schedule>    ASSESSMENT AND PLAN:    86y Male h/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  Episode of ? PAF/sinus tachy post HD.   Rectal bleeding- probably diverticular bleeding    HR stable on Cardizem  Off Eliquis- Pt on Eliquis for DVT. Known PAF in the past and pt was deemed high risk for anticoagulation for AF.   Plavix held: recent LLE bypass   Monitor H/H  BP stable

## 2021-04-06 NOTE — DISCHARGE NOTE PROVIDER - CARE PROVIDERS DIRECT ADDRESSES
,[email or web address]

## 2021-04-06 NOTE — CONSULT NOTE ADULT - CONSULT REASON
RICHARDS, weakness
RLE rest pain
Debility
LLE pain
PPM failure, Acute blood loss anemia
anemia, dark stools
ESRD

## 2021-04-07 LAB
ANISOCYTOSIS BLD QL: SLIGHT — SIGNIFICANT CHANGE UP
BASE EXCESS BLDA CALC-SCNC: 8 MMOL/L — HIGH (ref -2–2)
BLOOD GAS COMMENTS ARTERIAL: SIGNIFICANT CHANGE UP
GAS PNL BLDA: SIGNIFICANT CHANGE UP
GIANT PLATELETS BLD QL SMEAR: PRESENT — SIGNIFICANT CHANGE UP
GLUCOSE BLDC GLUCOMTR-MCNC: 111 MG/DL — HIGH (ref 70–99)
GLUCOSE BLDC GLUCOMTR-MCNC: 125 MG/DL — HIGH (ref 70–99)
GLUCOSE BLDC GLUCOMTR-MCNC: 126 MG/DL — HIGH (ref 70–99)
GLUCOSE BLDC GLUCOMTR-MCNC: 94 MG/DL — SIGNIFICANT CHANGE UP (ref 70–99)
HCO3 BLDA-SCNC: 32 MMOL/L — HIGH (ref 20–26)
HCT VFR BLD CALC: 28.4 % — LOW (ref 39–50)
HGB BLD-MCNC: 9.3 G/DL — LOW (ref 13–17)
HOROWITZ INDEX BLDA+IHG-RTO: 2 — SIGNIFICANT CHANGE UP
MACROCYTES BLD QL: SLIGHT — SIGNIFICANT CHANGE UP
MANUAL SMEAR VERIFICATION: SIGNIFICANT CHANGE UP
MCHC RBC-ENTMCNC: 29.8 PG — SIGNIFICANT CHANGE UP (ref 27–34)
MCHC RBC-ENTMCNC: 32.7 GM/DL — SIGNIFICANT CHANGE UP (ref 32–36)
MCV RBC AUTO: 91 FL — SIGNIFICANT CHANGE UP (ref 80–100)
MICROCYTES BLD QL: SLIGHT — SIGNIFICANT CHANGE UP
NEUTS BAND # BLD: 3.5 % — SIGNIFICANT CHANGE UP (ref 0–8)
PCO2 BLDA: 38 MMHG — SIGNIFICANT CHANGE UP (ref 35–45)
PH BLDA: 7.52 — HIGH (ref 7.35–7.45)
PLAT MORPH BLD: NORMAL — SIGNIFICANT CHANGE UP
PLATELET # BLD AUTO: 510 K/UL — HIGH (ref 150–400)
PO2 BLDA: 98 MMHG — SIGNIFICANT CHANGE UP (ref 83–108)
POIKILOCYTOSIS BLD QL AUTO: SLIGHT — SIGNIFICANT CHANGE UP
POLYCHROMASIA BLD QL SMEAR: SLIGHT — SIGNIFICANT CHANGE UP
RBC # BLD: 3.12 M/UL — LOW (ref 4.2–5.8)
RBC # FLD: 16.1 % — HIGH (ref 10.3–14.5)
RBC BLD AUTO: ABNORMAL
SAO2 % BLDA: 99 % — SIGNIFICANT CHANGE UP (ref 95–99)
SARS-COV-2 RNA SPEC QL NAA+PROBE: SIGNIFICANT CHANGE UP
SCHISTOCYTES BLD QL AUTO: SLIGHT — SIGNIFICANT CHANGE UP
TARGETS BLD QL SMEAR: SLIGHT — SIGNIFICANT CHANGE UP
WBC # BLD: 8 K/UL — SIGNIFICANT CHANGE UP (ref 3.8–10.5)
WBC # FLD AUTO: 8 K/UL — SIGNIFICANT CHANGE UP (ref 3.8–10.5)

## 2021-04-07 PROCEDURE — 99233 SBSQ HOSP IP/OBS HIGH 50: CPT

## 2021-04-07 PROCEDURE — 71045 X-RAY EXAM CHEST 1 VIEW: CPT | Mod: 26

## 2021-04-07 PROCEDURE — 90937 HEMODIALYSIS REPEATED EVAL: CPT

## 2021-04-07 RX ORDER — ERYTHROPOIETIN 10000 [IU]/ML
10000 INJECTION, SOLUTION INTRAVENOUS; SUBCUTANEOUS ONCE
Refills: 0 | Status: COMPLETED | OUTPATIENT
Start: 2021-04-07 | End: 2021-04-07

## 2021-04-07 RX ADMIN — PANTOPRAZOLE SODIUM 40 MILLIGRAM(S): 20 TABLET, DELAYED RELEASE ORAL at 04:35

## 2021-04-07 RX ADMIN — Medication 650 MILLIGRAM(S): at 04:35

## 2021-04-07 RX ADMIN — HEPARIN SODIUM 5000 UNIT(S): 5000 INJECTION INTRAVENOUS; SUBCUTANEOUS at 04:35

## 2021-04-07 RX ADMIN — Medication 1 DROP(S): at 13:35

## 2021-04-07 RX ADMIN — Medication 650 MILLIGRAM(S): at 16:55

## 2021-04-07 RX ADMIN — SENNA PLUS 2 TABLET(S): 8.6 TABLET ORAL at 21:30

## 2021-04-07 RX ADMIN — PANTOPRAZOLE SODIUM 40 MILLIGRAM(S): 20 TABLET, DELAYED RELEASE ORAL at 16:55

## 2021-04-07 RX ADMIN — ERYTHROPOIETIN 10000 UNIT(S): 10000 INJECTION, SOLUTION INTRAVENOUS; SUBCUTANEOUS at 14:58

## 2021-04-07 RX ADMIN — Medication 650 MILLIGRAM(S): at 00:55

## 2021-04-07 RX ADMIN — Medication 25 MILLIGRAM(S): at 16:55

## 2021-04-07 RX ADMIN — Medication 240 MILLIGRAM(S): at 04:34

## 2021-04-07 RX ADMIN — DULOXETINE HYDROCHLORIDE 60 MILLIGRAM(S): 30 CAPSULE, DELAYED RELEASE ORAL at 16:54

## 2021-04-07 RX ADMIN — HEPARIN SODIUM 5000 UNIT(S): 5000 INJECTION INTRAVENOUS; SUBCUTANEOUS at 12:12

## 2021-04-07 RX ADMIN — HEPARIN SODIUM 5000 UNIT(S): 5000 INJECTION INTRAVENOUS; SUBCUTANEOUS at 21:31

## 2021-04-07 RX ADMIN — Medication 20 MILLIGRAM(S): at 16:55

## 2021-04-07 RX ADMIN — Medication 650 MILLIGRAM(S): at 23:32

## 2021-04-07 RX ADMIN — Medication 25 MILLIGRAM(S): at 04:35

## 2021-04-07 RX ADMIN — Medication 650 MILLIGRAM(S): at 12:12

## 2021-04-07 RX ADMIN — Medication 100 MILLIGRAM(S): at 16:55

## 2021-04-07 NOTE — PROGRESS NOTE ADULT - ASSESSMENT
83 y/o M s/p explant of LLE PTFE, fem-AT bypass with cryo vein. On saturday patient had scant amount of blood in stool. Eliquis was held and SQH was started.  Patient receive 2 units of blood yesterday, Hgb responded appropriately from 8.2 to 11. Patient most recent bowel movements non-bloody will continue to monitor and follow up GI recommendations.    Plan:   - GI consult appreciated - high risk for recurrent GI bleed but benefits outweigh risks  given severe CV disease   - will continue to monitor HB and GI for recurrent bleed and resume therapeutic AC if appropriate  - Continue on SQH  - Ortho recs appreciated - f/u in clinic with Dr. Champagne PRN  - Daily dressing change   -Discuss with  LYNNE placement for once patient in medically stable

## 2021-04-07 NOTE — PROGRESS NOTE ADULT - ASSESSMENT
Patient was seen and evaluated on dialysis.   Patient is tolerating the procedure well.   T(C): 36.4 (04-07-21 @ 09:20), Max: 37.3 (04-07-21 @ 07:37)  HR: 58 (04-07-21 @ 09:20) (58 - 78)  BP: 163/63 (04-07-21 @ 09:20) (146/60 - 186/70)  Continue dialysis:  TIW  Dialyzer: Revaclear 300         QB: 450 ml.,       QD: 500ml.,  Goal UF 2.7  over  3 Hours

## 2021-04-07 NOTE — PROGRESS NOTE ADULT - SUBJECTIVE AND OBJECTIVE BOX
Patient is a 86y old  Male who presents with a chief complaint of symptomatic anemia (07 Apr 2021 11:24)      HPI:  87 y/o male with PMHx of HTN, HLD, CAD, HFpEF, s/p Right CEA for Carotid artery disease, ESRD on HD 2d/week (M/Fri) via LUE fistula, s/p flecainide w/ ILR placed 6/2020, AS s/p TAVR, PAD . Patient has no melena . TOlerated solid diet. No abdominal pain .          ENMT:  No difficulty hearing, tinnitus, vertigo; No sinus or throat pain  Respiratory: No cough, wheezing, chills or hemoptysis  Cardiovascular: No chest pain, palpitations, dizziness or leg swelling  Gastrointestinal: No abdominal or epigastric pain. No nausea, vomiting or hematemesis; No diarrhea or constipation. No melena or hematochezia.  Skin: No itching, burning, rashes or lesions   Musculoskeletal: No joint pain or swelling; No muscle, back or extremity pain    PAST MEDICAL & SURGICAL HISTORY:  DM (diabetes mellitus)  - resolved    AV block, 1st degree    Arrhythmia    CAD (coronary artery disease)    HTN (hypertension)    VT (ventricular tachycardia)    RICHARDS (dyspnea on exertion)    Anemia    Risk factors for obstructive sleep apnea    ESRD on dialysis  HD on Mondays and Fridays    Aortic stenosis  - s/p valve replacement    GI bleeding  due to ulcerated polyps and colonic AVMs    H/O circumcision  at  age  65    H/O left knee surgery    H/O angioplasty  2013,  no  intervention    A-V fistula  left arm 5/2017    H/O carotid endarterectomy  Right    S/P TAVR (transcatheter aortic valve replacement)    History of loop recorder        FAMILY HISTORY:  Family history of cancer (Grandparent)    Family history of lung cancer (Grandparent)    Family history of premature CAD    FH: type 2 diabetes        SOCIAL HISTORY:  Smoking Status: [ ] Current, [ ] Former, [ ] Never  Pack Years:  [  ] EtOH-no  [  ] IVDA    MEDICATIONS:  MEDICATIONS  (STANDING):  acetaminophen   Tablet .. 650 milliGRAM(s) Oral every 6 hours  ceFAZolin   IVPB 1000 milliGRAM(s) IV Intermittent every 24 hours  dextrose 40% Gel 15 Gram(s) Oral once  dextrose 5%. 1000 milliLiter(s) (50 mL/Hr) IV Continuous <Continuous>  dextrose 5%. 1000 milliLiter(s) (100 mL/Hr) IV Continuous <Continuous>  dextrose 50% Injectable 25 Gram(s) IV Push once  dextrose 50% Injectable 12.5 Gram(s) IV Push once  dextrose 50% Injectable 25 Gram(s) IV Push once  diltiazem    milliGRAM(s) Oral daily  DULoxetine 60 milliGRAM(s) Oral daily  epoetin juan-epbx (RETACRIT) Injectable 92747 Unit(s) IV Push <User Schedule>  epoetin juan-epbx (RETACRIT) Injectable 09211 Unit(s) IV Push once  glucagon  Injectable 1 milliGRAM(s) IntraMuscular once  heparin   Injectable 5000 Unit(s) SubCutaneous every 8 hours  hydrALAZINE 25 milliGRAM(s) Oral two times a day  insulin lispro (ADMELOG) corrective regimen sliding scale   SubCutaneous three times a day before meals  pantoprazole  Injectable 40 milliGRAM(s) IV Push every 12 hours  polyethylene glycol 3350 17 Gram(s) Oral daily  senna 2 Tablet(s) Oral at bedtime  tamsulosin 0.4 milliGRAM(s) Oral at bedtime  torsemide 20 milliGRAM(s) Oral <User Schedule>    MEDICATIONS  (PRN):  artificial tears (preservative free) Ophthalmic Solution 1 Drop(s) Both EYES two times a day PRN Dry Eyes  traMADol 25 milliGRAM(s) Oral every 4 hours PRN Severe Pain (7 - 10)      Allergies    Plavix (Hives)  Toprol-XL (Rash)    Intolerances        Vital Signs Last 24 Hrs  T(C): 37.1 (07 Apr 2021 12:54), Max: 37.3 (07 Apr 2021 07:37)  T(F): 98.7 (07 Apr 2021 12:54), Max: 99.2 (07 Apr 2021 07:37)  HR: 61 (07 Apr 2021 12:54) (58 - 78)  BP: 170/57 (07 Apr 2021 12:54) (146/60 - 186/70)  BP(mean): --  RR: 18 (07 Apr 2021 12:54) (16 - 20)  SpO2: 97% (07 Apr 2021 12:54) (92% - 97%)    04-06 @ 07:01  -  04-07 @ 07:00  --------------------------------------------------------  IN: 0 mL / OUT: 100 mL / NET: -100 mL          PHYSICAL EXAM:    General: Well developed; well nourished; in no acute distress  HEENT: MMM, conjunctiva and sclera clear  H- RRR  L- CTA   Gastrointestinal: Soft, non-tender non-distended; Normal bowel sounds; No rebound or guarding  Extremities: Normal range of motion, No clubbing, cyanosis or edema  Neurological: Alert and oriented x3  Skin: Warm and dry. No obvious rash      LABS:                        9.3    8.00  )-----------( 510      ( 07 Apr 2021 07:28 )             28.4     06 Apr 2021 05:51    136    |  97     |  41.0   ----------------------------<  106    3.8     |  27.0   |  3.74     Ca    8.3        06 Apr 2021 05:51  Phos  3.0       06 Apr 2021 05:51  Mg     1.9       06 Apr 2021 05:51                RADIOLOGY & ADDITIONAL STUDIES:     < from: US Duplex Venous Lower Ext Complete, Bilateral (04.03.21 @ 13:54) >  eterogeneous collection in the left groin suggesting hematoma. Enlarged left groin lymph node.    Persistent thrombus in the left femoral, popliteal, tibial peroneal trunk and posterior tibial veins. No evidence of extension into the left common femoral vein.    The right common femoral, femoral and popliteal veins appear patent. There is scarring in the right tibial peroneal trunk.      < end of copied text >

## 2021-04-07 NOTE — PROGRESS NOTE ADULT - PROBLEM SELECTOR PLAN 1
- appears to have resolved for now. Hgb stable. Hx of AVM'S colon polyps  diverticulosis.   - Pt is high risk for GI Bleeding with anticoagulation  given hx of recurrent anemia, AVM's . If pt absolutely has to have anticoagulation, then try one anticoagulant and monitor for further bleeding.

## 2021-04-07 NOTE — PROGRESS NOTE ADULT - SUBJECTIVE AND OBJECTIVE BOX
CHRISTIANO ELIZABETH    [id number]    History:      Patient seen and examined this Am with [facility] vascular Attending.  No acute events overnight.  No bloody bowel movements reported. Patient is doing well. Denies nausea, vomiting, chest pain, shortness of breath, abdominal pain or fever. reports surgical site soreness.     Vital Signs Last 24 Hrs  T(C): 37.3 (07 Apr 2021 07:37), Max: 37.3 (07 Apr 2021 07:37)  T(F): 99.2 (07 Apr 2021 07:37), Max: 99.2 (07 Apr 2021 07:37)  HR: 62 (07 Apr 2021 07:37) (62 - 78)  BP: 170/72 (07 Apr 2021 07:37) (146/60 - 186/70)  BP(mean): --  RR: 20 (07 Apr 2021 07:37) (18 - 20)  SpO2: 96% (07 Apr 2021 07:37) (92% - 97%)  I&O's Summary    06 Apr 2021 07:01  -  07 Apr 2021 07:00  --------------------------------------------------------  IN: 0 mL / OUT: 100 mL / NET: -100 mL                              9.3    8.00  )-----------( 510      ( 07 Apr 2021 07:28 )             28.4     04-06    136  |  97<L>  |  41.0<H>  ----------------------------<  106<H>  3.8   |  27.0  |  3.74<H>    Ca    8.3<L>      06 Apr 2021 05:51  Phos  3.0     04-06  Mg     1.9     04-06        MEDICATIONS  (STANDING):  acetaminophen   Tablet .. 650 milliGRAM(s) Oral every 6 hours  ceFAZolin   IVPB 1000 milliGRAM(s) IV Intermittent every 24 hours  dextrose 40% Gel 15 Gram(s) Oral once  dextrose 5%. 1000 milliLiter(s) (50 mL/Hr) IV Continuous <Continuous>  dextrose 5%. 1000 milliLiter(s) (100 mL/Hr) IV Continuous <Continuous>  dextrose 50% Injectable 25 Gram(s) IV Push once  dextrose 50% Injectable 12.5 Gram(s) IV Push once  dextrose 50% Injectable 25 Gram(s) IV Push once  diltiazem    milliGRAM(s) Oral daily  DULoxetine 60 milliGRAM(s) Oral daily  epoetin juan-epbx (RETACRIT) Injectable 87882 Unit(s) SubCutaneous <User Schedule>  epoetin juan-epbx (RETACRIT) Injectable 86929 Unit(s) IV Push <User Schedule>  glucagon  Injectable 1 milliGRAM(s) IntraMuscular once  heparin   Injectable 5000 Unit(s) SubCutaneous every 8 hours  hydrALAZINE 25 milliGRAM(s) Oral two times a day  insulin lispro (ADMELOG) corrective regimen sliding scale   SubCutaneous three times a day before meals  pantoprazole  Injectable 40 milliGRAM(s) IV Push every 12 hours  polyethylene glycol 3350 17 Gram(s) Oral daily  senna 2 Tablet(s) Oral at bedtime  tamsulosin 0.4 milliGRAM(s) Oral at bedtime  torsemide 20 milliGRAM(s) Oral <User Schedule>    MEDICATIONS  (PRN):  artificial tears (preservative free) Ophthalmic Solution 1 Drop(s) Both EYES two times a day PRN Dry Eyes  traMADol 25 milliGRAM(s) Oral every 4 hours PRN Severe Pain (7 - 10)      Physical exam:     Alert, oriented to self and place  non labored breathing  abdomen is soft, non tender  left groin incision CDI, Left lateral thigh incision with improving surrounding erythema. Bypass graft with palpable pulse to the lateral thigh.  the other incisions are CDI (Medial calf and distal)  left 30dergree knee flexion contracture   Left foot is warm, + biphasic AT signal     Primary  Assessment:    reperfused left distal LE  resolving GI bleed  left CFV thrombus  left knee contracture due it inactivity  localized erythema to the left lateral thigh  •   Plan:   - ABX till 5 day, then D/C ( IV ancef may be transition to PO Keflex)   • cont sub-q heparin for now for DVT  - may restart low grade antiplatelets if attending agrees  - left knee with progressive flexion contraction due to extensive post surgical inactivity.  Patient requires aggressive Physical Therapy, needs to start an active regimen  - will start the process for discharge to a rehab facility; patient is currently stable from a vascular surgical standpoint

## 2021-04-07 NOTE — PROGRESS NOTE ADULT - ASSESSMENT
Patient tolerated HD for 3 hours with 1liter of fluid removal.     Vital signs were stable during treatment.     patient is confused.     Post access care provided. Report given to primary RN Patient tolerated HD for 3 hours,    Vital signs were stable during treatment.     patient is confused.     Post access care provided. Report given to primary RN    Patient tolerated HD without complications for 3 hours with 2.7 liters of fluid removal.     vital signs were tolerated. Post access care provided.     Report given to a primary RN.

## 2021-04-07 NOTE — PROGRESS NOTE ADULT - SUBJECTIVE AND OBJECTIVE BOX
Patient was seen and evaluated on dialysis.   Patient is tolerating the procedure well.   T(C): 36.8 (04-07-21 @ 16:20), Max: 37.3 (04-07-21 @ 07:37)  HR: 71 (04-07-21 @ 16:53) (58 - 78)  BP: 174/65 (04-07-21 @ 16:53) (146/60 - 182/63)  Continue dialysis:  BIW  Dialyzer: Revaclear 300          QB: 450 ml.,     QD: 500ml.,  Goal UF  3 L  over 3 Hours     Patient was seen and evaluated on dialysis.   No c/o CP , + SOB, No  NV  no F/C  ++ swelling    T(C): 36.8 (04-07-21 @ 16:20), Max: 37.3 (04-07-21 @ 07:37)  HR: 71 (04-07-21 @ 16:53) (58 - 78)  BP: 174/65 (04-07-21 @ 16:53) (146/60 - 182/63)  Wt(kg): --  PE ;  NAD, Pale,   lungs - Basilar crackles,  CV gr 1 murmur,   No gallop or rub  Abd : soft, NT BS +, No masses  Ext- ++ edema  Neuro : Grossly intact, moving extremities                        9.3    8.00  )-----------( 510      ( 07 Apr 2021 07:28 )             28.4        04-06    136  |  97<L>  |  41.0<H>  ----------------------------<  106<H>  3.8   |  27.0  |  3.74<H>    Ca    8.3<L>      06 Apr 2021 05:51  Phos  3.0     04-06  Mg     1.9     04-06    MEDICATIONS  (STANDING):  acetaminophen   Tablet ..  artificial tears (preservative free) Ophthalmic Solution PRN  ceFAZolin   IVPB  dextrose 40% Gel  dextrose 5%.  dextrose 5%.  dextrose 50% Injectable  dextrose 50% Injectable  dextrose 50% Injectable  diltiazem   CD  DULoxetine  epoetin juan-epbx (RETACRIT) Injectable  glucagon  Injectable  heparin   Injectable  hydrALAZINE  insulin lispro (ADMELOG) corrective regimen sliding scale  pantoprazole  Injectable  polyethylene glycol 3350  senna  tamsulosin  torsemide  traMADol PRN      Patient stable  Josh HD easily  Continue

## 2021-04-07 NOTE — PROGRESS NOTE ADULT - SUBJECTIVE AND OBJECTIVE BOX
HPI/OVERNIGHT EVENTS: Patient with some positional discomfort and subjective difficulty breathing. Surgical resident evaluated patient at that time and patient sat up with resolution of symptoms. patient reported no further difficulty breathing. no pain, and no other symptoms at the time. Vitals were within normal limits. HR 67 /67, SPO2 96 % on RA. no other events     MEDICATIONS  (STANDING):  acetaminophen   Tablet .. 650 milliGRAM(s) Oral every 6 hours  ceFAZolin   IVPB 1000 milliGRAM(s) IV Intermittent every 24 hours  dextrose 40% Gel 15 Gram(s) Oral once  dextrose 5%. 1000 milliLiter(s) (50 mL/Hr) IV Continuous <Continuous>  dextrose 5%. 1000 milliLiter(s) (100 mL/Hr) IV Continuous <Continuous>  dextrose 50% Injectable 25 Gram(s) IV Push once  dextrose 50% Injectable 12.5 Gram(s) IV Push once  dextrose 50% Injectable 25 Gram(s) IV Push once  diltiazem    milliGRAM(s) Oral daily  DULoxetine 60 milliGRAM(s) Oral daily  epoetin juan-epbx (RETACRIT) Injectable 29258 Unit(s) SubCutaneous <User Schedule>  epoetin juan-epbx (RETACRIT) Injectable 61403 Unit(s) IV Push <User Schedule>  glucagon  Injectable 1 milliGRAM(s) IntraMuscular once  heparin   Injectable 5000 Unit(s) SubCutaneous every 8 hours  hydrALAZINE 25 milliGRAM(s) Oral two times a day  insulin lispro (ADMELOG) corrective regimen sliding scale   SubCutaneous three times a day before meals  pantoprazole  Injectable 40 milliGRAM(s) IV Push every 12 hours  polyethylene glycol 3350 17 Gram(s) Oral daily  senna 2 Tablet(s) Oral at bedtime  tamsulosin 0.4 milliGRAM(s) Oral at bedtime  torsemide 20 milliGRAM(s) Oral <User Schedule>    MEDICATIONS  (PRN):  artificial tears (preservative free) Ophthalmic Solution 1 Drop(s) Both EYES two times a day PRN Dry Eyes  traMADol 25 milliGRAM(s) Oral every 4 hours PRN Severe Pain (7 - 10)      Vital Signs Last 24 Hrs  T(C): 36.4 (06 Apr 2021 16:23), Max: 36.8 (06 Apr 2021 05:37)  T(F): 97.6 (06 Apr 2021 16:23), Max: 98.3 (06 Apr 2021 05:37)  HR: 69 (06 Apr 2021 23:05) (63 - 78)  BP: 173/68 (06 Apr 2021 23:05) (136/59 - 186/70)  BP(mean): --  RR: 19 (06 Apr 2021 23:05) (18 - 20)  SpO2: 95% (06 Apr 2021 23:05) (92% - 97%)    Physical Examination:  General: alert, oriented x 3 in no acute distress   CV: normal S1/S2, RRR, no gallops, murmurs or rubs   Lungs: clear to auscultation b/l, symmetric chest movement, no rales, rhonchi or wheezing   Abdomen: soft, depressible, non-tender, non-distended, +BS  EXT: RLE: sensation intact, limited ROM due to movment surgical wound sites with mild surrounding erythema that is improving        I&O's Detail    05 Apr 2021 07:01  -  06 Apr 2021 07:00  --------------------------------------------------------  IN:    Oral Fluid: 240 mL  Total IN: 240 mL    OUT:    Other (mL): 1000 mL    Voided (mL): 550 mL  Total OUT: 1550 mL    Total NET: -1310 mL          LABS:                        8.8    9.98  )-----------( 468      ( 06 Apr 2021 05:51 )             27.3     04-06    136  |  97<L>  |  41.0<H>  ----------------------------<  106<H>  3.8   |  27.0  |  3.74<H>    Ca    8.3<L>      06 Apr 2021 05:51  Phos  3.0     04-06  Mg     1.9     04-06

## 2021-04-07 NOTE — PROGRESS NOTE ADULT - SUBJECTIVE AND OBJECTIVE BOX
Pt seen and examined. FU for  shortness of breath      Overnight events/Complaints: Pt feels better. No complains. Tachycardia for about 10 min then resolved. Overall rates controlled. BP stable.    Vital Signs Last 24 Hrs  T(C): 36.4 (07 Apr 2021 09:20), Max: 37.3 (07 Apr 2021 07:37)  T(F): 97.6 (07 Apr 2021 09:20), Max: 99.2 (07 Apr 2021 07:37)  HR: 58 (07 Apr 2021 09:20) (58 - 78)  BP: 163/63 (07 Apr 2021 09:20) (146/60 - 186/70)  RR: 16 (07 Apr 2021 09:20) (16 - 20)  SpO2: 96% (07 Apr 2021 09:20) (92% - 97%)    I&O's Summary    06 Apr 2021 07:01  -  07 Apr 2021 07:00  --------------------------------------------------------  IN: 0 mL / OUT: 100 mL / NET: -100 mL    PHYSICAL EXAM:  Constitutional: Elderly male in bed  HEENT:     Head: Normocephalic and atraumatic  Neck: supple. No JVD  Cardiovascular: regular S1 S2  Respiratory: Lungs clear to auscultation; no crepitations, no wheeze  Gastrointestinal:  Soft, Non-tender, + BS	  Musculoskeletal: Normal range of motion. No edema  Skin: Warm and dry. No cyanosis . No diaphoresis.  Neurologic: Alert oriented to time place and person. Normal strength and no tremor.        LABS:                        9.3    8.00  )-----------( 510      ( 07 Apr 2021 07:28 )             28.4     04-06    136  |  97<L>  |  41.0<H>  ----------------------------<  106<H>  3.8   |  27.0  |  3.74<H>    Ca    8.3<L>      06 Apr 2021 05:51  Phos  3.0     04-06  Mg     1.9     04-06    RADIOLOGY & ADDITIONAL STUDIES: (reviewed)  CXR was independently visualized/reviewed  and demonstrated: improving infiltrates    CARDIOLOGY TESTING:(reviewed)     12 lead EKG independently visualized/reviewed  and demonstrated paced rhythm    ECHOCARDIOGRAM independently visualized/reviewed and demonstrated :   Summary:   1. Moderately enlarged left atrium.   2. There is mild concentric left ventricular hypertrophy.   3. Left ventricular ejection fraction, by visual estimation, is 60 to 65%.   4. Grade II diastolic dysfunction. Elevated mean left atrial pressure.   5. Mildly enlarged right atrium.   6. Mildly enlarged right ventricle. Mildly reduced RV systolic function.   7. Mild mitral stenosis. Moderate mitral valve regurgitation. Elevated mean gradient likely due to volume overload. Repeat study after diuresis to reevaluate mitral valve. If clinically indicated.   8. 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 Well seated valve. Acceptable gradients. No regurgitation.   9. Mild tricuspid regurgitation.  10. Estimated pulmonary artery systolic pressure is 78 mmHg - severe pulmonary hypertension.  11. There is no evidence of pericardial effusion.    Catheterization:  RIGHT LOWER EXTREMITY VESSELS: Right superficial femoral: There was a 90 %  stenosis. Right peroneal: There was a 100 % stenosis.  COMPLICATIONS: No complications occurred during the cath lab visit.  Prepared and signed by  Siva Albrecht MD    TELEMETRY independently visualized/reviewed and demonstrated : PAF with paced rhythm    MEDICATIONS:(reviewed)  MEDICATIONS  (STANDING):  acetaminophen   Tablet .. 650 milliGRAM(s) Oral every 6 hours  ceFAZolin   IVPB 1000 milliGRAM(s) IV Intermittent every 24 hours  dextrose 40% Gel 15 Gram(s) Oral once  dextrose 5%. 1000 milliLiter(s) (50 mL/Hr) IV Continuous <Continuous>  dextrose 5%. 1000 milliLiter(s) (100 mL/Hr) IV Continuous <Continuous>  dextrose 50% Injectable 25 Gram(s) IV Push once  dextrose 50% Injectable 12.5 Gram(s) IV Push once  dextrose 50% Injectable 25 Gram(s) IV Push once  diltiazem    milliGRAM(s) Oral daily  DULoxetine 60 milliGRAM(s) Oral daily  epoetin juan-epbx (RETACRIT) Injectable 02663 Unit(s) SubCutaneous <User Schedule>  epoetin juan-epbx (RETACRIT) Injectable 46360 Unit(s) IV Push <User Schedule>  glucagon  Injectable 1 milliGRAM(s) IntraMuscular once  heparin   Injectable 5000 Unit(s) SubCutaneous every 8 hours  hydrALAZINE 25 milliGRAM(s) Oral two times a day  insulin lispro (ADMELOG) corrective regimen sliding scale   SubCutaneous three times a day before meals  pantoprazole  Injectable 40 milliGRAM(s) IV Push every 12 hours  polyethylene glycol 3350 17 Gram(s) Oral daily  senna 2 Tablet(s) Oral at bedtime  tamsulosin 0.4 milliGRAM(s) Oral at bedtime  torsemide 20 milliGRAM(s) Oral <User Schedule>    ASSESSMENT AND PLAN:    86y Male h/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  Episode of ? PAF/sinus tachy post HD.   Rectal bleeding- probably diverticular bleeding    HR stable on Cardizem  Pt on Eliquis for DVT.   Known PAF in the past and pt was deemed high risk for anticoagulation for AF.   Plavix held: recent LLE bypass   H/H stable  BP controlled    Needs UF,

## 2021-04-07 NOTE — PROGRESS NOTE ADULT - SUBJECTIVE AND OBJECTIVE BOX
[facility], [facility]                              [address] [address]                                                 PHONE: [phone]                                                 FAX: [phone]  -----------------------------------------------------------------------------------------------  Pt seen and examined. FU for  shortness of breath      Overnight events/Complaints: Pt feels better. No complains. Tachycardia for about 10 min then resolved. Overall rates controlled. BP stable.    Vital Signs Last 24 Hrs  T(C): 36.4 (07 Apr 2021 09:20), Max: 37.3 (07 Apr 2021 07:37)  T(F): 97.6 (07 Apr 2021 09:20), Max: 99.2 (07 Apr 2021 07:37)  HR: 58 (07 Apr 2021 09:20) (58 - 78)  BP: 163/63 (07 Apr 2021 09:20) (146/60 - 186/70)  BP(mean): --  RR: 16 (07 Apr 2021 09:20) (16 - 20)  SpO2: 96% (07 Apr 2021 09:20) (92% - 97%)  I&O's Summary    06 Apr 2021 07:01  -  07 Apr 2021 07:00  --------------------------------------------------------  IN: 0 mL / OUT: 100 mL / NET: -100 mL    PHYSICAL EXAM:  Constitutional: Elderly male in bed  HEENT:     Head: Normocephalic and atraumatic  Neck: supple. No JVD  Cardiovascular: regular S1 S2  Respiratory: Lungs clear to auscultation; no crepitations, no wheeze  Gastrointestinal:  Soft, Non-tender, + BS	  Musculoskeletal: Normal range of motion. No edema  Skin: Warm and dry. No cyanosis . No diaphoresis.  Neurologic: Alert oriented to time place and person. Normal strength and no tremor.        LABS:                        9.3    8.00  )-----------( 510      ( 07 Apr 2021 07:28 )             28.4     04-06    136  |  97<L>  |  41.0<H>  ----------------------------<  106<H>  3.8   |  27.0  |  3.74<H>    Ca    8.3<L>      06 Apr 2021 05:51  Phos  3.0     04-06  Mg     1.9     04-06    RADIOLOGY & ADDITIONAL STUDIES: (reviewed)  CXR was independently visualized/reviewed  and demonstrated: improving infiltrates    CARDIOLOGY TESTING:(reviewed)     12 lead EKG independently visualized/reviewed  and demonstrated paced rhythm    ECHOCARDIOGRAM independently visualized/reviewed and demonstrated :   Summary:   1. Moderately enlarged left atrium.   2. There is mild concentric left ventricular hypertrophy.   3. Left ventricular ejection fraction, by visual estimation, is 60 to 65%.   4. Grade II diastolic dysfunction. Elevated mean left atrial pressure.   5. Mildly enlarged right atrium.   6. Mildly enlarged right ventricle. Mildly reduced RV systolic function.   7. Mild mitral stenosis. Moderate mitral valve regurgitation. Elevated mean gradient likely due to volume overload. Repeat study after diuresis to reevaluate mitral valve. If clinically indicated.   8. 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 Well seated valve. Acceptable gradients. No regurgitation.   9. Mild tricuspid regurgitation.  10. Estimated pulmonary artery systolic pressure is 78 mmHg - severe pulmonary hypertension.  11. There is no evidence of pericardial effusion.    Catheterization:  RIGHT LOWER EXTREMITY VESSELS: Right superficial femoral: There was a 90 %  stenosis. Right peroneal: There was a 100 % stenosis.  COMPLICATIONS: No complications occurred during the cath lab visit.  Prepared and signed by  Siva Albrecht MD    TELEMETRY independently visualized/reviewed and demonstrated : PAF with paced rhythm    MEDICATIONS:(reviewed)  MEDICATIONS  (STANDING):  acetaminophen   Tablet .. 650 milliGRAM(s) Oral every 6 hours  ceFAZolin   IVPB 1000 milliGRAM(s) IV Intermittent every 24 hours  dextrose 40% Gel 15 Gram(s) Oral once  dextrose 5%. 1000 milliLiter(s) (50 mL/Hr) IV Continuous <Continuous>  dextrose 5%. 1000 milliLiter(s) (100 mL/Hr) IV Continuous <Continuous>  dextrose 50% Injectable 25 Gram(s) IV Push once  dextrose 50% Injectable 12.5 Gram(s) IV Push once  dextrose 50% Injectable 25 Gram(s) IV Push once  diltiazem    milliGRAM(s) Oral daily  DULoxetine 60 milliGRAM(s) Oral daily  epoetin juan-epbx (RETACRIT) Injectable 27290 Unit(s) SubCutaneous <User Schedule>  epoetin juan-epbx (RETACRIT) Injectable 21750 Unit(s) IV Push <User Schedule>  glucagon  Injectable 1 milliGRAM(s) IntraMuscular once  heparin   Injectable 5000 Unit(s) SubCutaneous every 8 hours  hydrALAZINE 25 milliGRAM(s) Oral two times a day  insulin lispro (ADMELOG) corrective regimen sliding scale   SubCutaneous three times a day before meals  pantoprazole  Injectable 40 milliGRAM(s) IV Push every 12 hours  polyethylene glycol 3350 17 Gram(s) Oral daily  senna 2 Tablet(s) Oral at bedtime  tamsulosin 0.4 milliGRAM(s) Oral at bedtime  torsemide 20 milliGRAM(s) Oral <User Schedule>    ASSESSMENT AND PLAN:    86y Male h/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  Episode of ? PAF/sinus tachy post HD.   Rectal bleeding- probably diverticular bleeding    HR stable on Cardizem  Off Eliquis- Pt on Eliquis for DVT.   Known PAF in the past and pt was deemed high risk for anticoagulation for AF.   Plavix held: recent LLE bypass   H/H stable  BP controlled

## 2021-04-08 ENCOUNTER — APPOINTMENT (OUTPATIENT)
Age: 86
End: 2021-04-08

## 2021-04-08 ENCOUNTER — INPATIENT (INPATIENT)
Facility: HOSPITAL | Age: 86
LOS: 21 days | Discharge: HOME HEALTH | DRG: 949 | End: 2021-04-30
Attending: SPECIALIST | Admitting: SPECIALIST
Payer: MEDICARE

## 2021-04-08 ENCOUNTER — TRANSCRIPTION ENCOUNTER (OUTPATIENT)
Age: 86
End: 2021-04-08

## 2021-04-08 VITALS
DIASTOLIC BLOOD PRESSURE: 74 MMHG | RESPIRATION RATE: 18 BRPM | SYSTOLIC BLOOD PRESSURE: 169 MMHG | TEMPERATURE: 97 F | HEIGHT: 64 IN | WEIGHT: 159.61 LBS | HEART RATE: 89 BPM | OXYGEN SATURATION: 95 %

## 2021-04-08 VITALS
OXYGEN SATURATION: 96 % | SYSTOLIC BLOOD PRESSURE: 162 MMHG | DIASTOLIC BLOOD PRESSURE: 62 MMHG | HEART RATE: 66 BPM | TEMPERATURE: 98 F | RESPIRATION RATE: 17 BRPM

## 2021-04-08 DIAGNOSIS — Z98.890 OTHER SPECIFIED POSTPROCEDURAL STATES: Chronic | ICD-10-CM

## 2021-04-08 DIAGNOSIS — I77.0 ARTERIOVENOUS FISTULA, ACQUIRED: Chronic | ICD-10-CM

## 2021-04-08 DIAGNOSIS — Z98.62 PERIPHERAL VASCULAR ANGIOPLASTY STATUS: Chronic | ICD-10-CM

## 2021-04-08 DIAGNOSIS — I80.10 PHLEBITIS AND THROMBOPHLEBITIS OF UNSPECIFIED FEMORAL VEIN: ICD-10-CM

## 2021-04-08 DIAGNOSIS — Z95.2 PRESENCE OF PROSTHETIC HEART VALVE: Chronic | ICD-10-CM

## 2021-04-08 DIAGNOSIS — R53.81 OTHER MALAISE: ICD-10-CM

## 2021-04-08 LAB
GLUCOSE BLDC GLUCOMTR-MCNC: 104 MG/DL — HIGH (ref 70–99)
GLUCOSE BLDC GLUCOMTR-MCNC: 108 MG/DL — HIGH (ref 70–99)
GLUCOSE BLDC GLUCOMTR-MCNC: 127 MG/DL — HIGH (ref 70–99)
GLUCOSE BLDC GLUCOMTR-MCNC: 133 MG/DL — HIGH (ref 70–99)
HCT VFR BLD CALC: 30.6 % — LOW (ref 39–50)
HGB BLD-MCNC: 9.7 G/DL — LOW (ref 13–17)
MCHC RBC-ENTMCNC: 29.3 PG — SIGNIFICANT CHANGE UP (ref 27–34)
MCHC RBC-ENTMCNC: 31.7 GM/DL — LOW (ref 32–36)
MCV RBC AUTO: 92.4 FL — SIGNIFICANT CHANGE UP (ref 80–100)
PLATELET # BLD AUTO: 565 K/UL — HIGH (ref 150–400)
RBC # BLD: 3.31 M/UL — LOW (ref 4.2–5.8)
RBC # FLD: 15.9 % — HIGH (ref 10.3–14.5)
WBC # BLD: 8.24 K/UL — SIGNIFICANT CHANGE UP (ref 3.8–10.5)
WBC # FLD AUTO: 8.24 K/UL — SIGNIFICANT CHANGE UP (ref 3.8–10.5)

## 2021-04-08 PROCEDURE — 86900 BLOOD TYPING SEROLOGIC ABO: CPT

## 2021-04-08 PROCEDURE — 82550 ASSAY OF CK (CPK): CPT

## 2021-04-08 PROCEDURE — 82330 ASSAY OF CALCIUM: CPT

## 2021-04-08 PROCEDURE — 73551 X-RAY EXAM OF FEMUR 1: CPT

## 2021-04-08 PROCEDURE — 87210 SMEAR WET MOUNT SALINE/INK: CPT

## 2021-04-08 PROCEDURE — 93923 UPR/LXTR ART STDY 3+ LVLS: CPT

## 2021-04-08 PROCEDURE — P9059: CPT

## 2021-04-08 PROCEDURE — C1725: CPT

## 2021-04-08 PROCEDURE — 82435 ASSAY OF BLOOD CHLORIDE: CPT

## 2021-04-08 PROCEDURE — C1889: CPT

## 2021-04-08 PROCEDURE — 96375 TX/PRO/DX INJ NEW DRUG ADDON: CPT

## 2021-04-08 PROCEDURE — 86901 BLOOD TYPING SEROLOGIC RH(D): CPT

## 2021-04-08 PROCEDURE — 99152 MOD SED SAME PHYS/QHP 5/>YRS: CPT

## 2021-04-08 PROCEDURE — 97530 THERAPEUTIC ACTIVITIES: CPT

## 2021-04-08 PROCEDURE — 85025 COMPLETE CBC W/AUTO DIFF WBC: CPT

## 2021-04-08 PROCEDURE — C1757: CPT

## 2021-04-08 PROCEDURE — 82803 BLOOD GASES ANY COMBINATION: CPT

## 2021-04-08 PROCEDURE — 75710 ARTERY X-RAYS ARM/LEG: CPT

## 2021-04-08 PROCEDURE — C1768: CPT

## 2021-04-08 PROCEDURE — 82947 ASSAY GLUCOSE BLOOD QUANT: CPT

## 2021-04-08 PROCEDURE — 88300 SURGICAL PATH GROSS: CPT

## 2021-04-08 PROCEDURE — 83036 HEMOGLOBIN GLYCOSYLATED A1C: CPT

## 2021-04-08 PROCEDURE — 97167 OT EVAL HIGH COMPLEX 60 MIN: CPT

## 2021-04-08 PROCEDURE — 92526 ORAL FUNCTION THERAPY: CPT

## 2021-04-08 PROCEDURE — 97110 THERAPEUTIC EXERCISES: CPT

## 2021-04-08 PROCEDURE — 93971 EXTREMITY STUDY: CPT

## 2021-04-08 PROCEDURE — 85027 COMPLETE CBC AUTOMATED: CPT

## 2021-04-08 PROCEDURE — 85018 HEMOGLOBIN: CPT

## 2021-04-08 PROCEDURE — 80053 COMPREHEN METABOLIC PANEL: CPT

## 2021-04-08 PROCEDURE — 83880 ASSAY OF NATRIURETIC PEPTIDE: CPT

## 2021-04-08 PROCEDURE — U0003: CPT

## 2021-04-08 PROCEDURE — 92610 EVALUATE SWALLOWING FUNCTION: CPT

## 2021-04-08 PROCEDURE — 97535 SELF CARE MNGMENT TRAINING: CPT

## 2021-04-08 PROCEDURE — 82962 GLUCOSE BLOOD TEST: CPT

## 2021-04-08 PROCEDURE — 96374 THER/PROPH/DIAG INJ IV PUSH: CPT

## 2021-04-08 PROCEDURE — 36430 TRANSFUSION BLD/BLD COMPNT: CPT

## 2021-04-08 PROCEDURE — U0005: CPT

## 2021-04-08 PROCEDURE — 84132 ASSAY OF SERUM POTASSIUM: CPT

## 2021-04-08 PROCEDURE — 94003 VENT MGMT INPAT SUBQ DAY: CPT

## 2021-04-08 PROCEDURE — 99233 SBSQ HOSP IP/OBS HIGH 50: CPT

## 2021-04-08 PROCEDURE — 81001 URINALYSIS AUTO W/SCOPE: CPT

## 2021-04-08 PROCEDURE — 84295 ASSAY OF SERUM SODIUM: CPT

## 2021-04-08 PROCEDURE — 97163 PT EVAL HIGH COMPLEX 45 MIN: CPT

## 2021-04-08 PROCEDURE — C1760: CPT

## 2021-04-08 PROCEDURE — 71275 CT ANGIOGRAPHY CHEST: CPT

## 2021-04-08 PROCEDURE — 80048 BASIC METABOLIC PNL TOTAL CA: CPT

## 2021-04-08 PROCEDURE — 36415 COLL VENOUS BLD VENIPUNCTURE: CPT

## 2021-04-08 PROCEDURE — 99285 EMERGENCY DEPT VISIT HI MDM: CPT | Mod: 25

## 2021-04-08 PROCEDURE — 73562 X-RAY EXAM OF KNEE 3: CPT

## 2021-04-08 PROCEDURE — C1887: CPT

## 2021-04-08 PROCEDURE — 93005 ELECTROCARDIOGRAM TRACING: CPT

## 2021-04-08 PROCEDURE — 93925 LOWER EXTREMITY STUDY: CPT

## 2021-04-08 PROCEDURE — 71045 X-RAY EXAM CHEST 1 VIEW: CPT

## 2021-04-08 PROCEDURE — 84100 ASSAY OF PHOSPHORUS: CPT

## 2021-04-08 PROCEDURE — 83735 ASSAY OF MAGNESIUM: CPT

## 2021-04-08 PROCEDURE — 84484 ASSAY OF TROPONIN QUANT: CPT

## 2021-04-08 PROCEDURE — P9037: CPT

## 2021-04-08 PROCEDURE — 93306 TTE W/DOPPLER COMPLETE: CPT

## 2021-04-08 PROCEDURE — P9040: CPT

## 2021-04-08 PROCEDURE — 85730 THROMBOPLASTIN TIME PARTIAL: CPT

## 2021-04-08 PROCEDURE — 83605 ASSAY OF LACTIC ACID: CPT

## 2021-04-08 PROCEDURE — 73590 X-RAY EXAM OF LOWER LEG: CPT

## 2021-04-08 PROCEDURE — 94640 AIRWAY INHALATION TREATMENT: CPT

## 2021-04-08 PROCEDURE — 82553 CREATINE MB FRACTION: CPT

## 2021-04-08 PROCEDURE — 70450 CT HEAD/BRAIN W/O DYE: CPT

## 2021-04-08 PROCEDURE — 99261: CPT

## 2021-04-08 PROCEDURE — 97116 GAIT TRAINING THERAPY: CPT

## 2021-04-08 PROCEDURE — 82272 OCCULT BLD FECES 1-3 TESTS: CPT

## 2021-04-08 PROCEDURE — 86923 COMPATIBILITY TEST ELECTRIC: CPT

## 2021-04-08 PROCEDURE — 87040 BLOOD CULTURE FOR BACTERIA: CPT

## 2021-04-08 PROCEDURE — 36247 INS CATH ABD/L-EXT ART 3RD: CPT

## 2021-04-08 PROCEDURE — 86769 SARS-COV-2 COVID-19 ANTIBODY: CPT

## 2021-04-08 PROCEDURE — 99223 1ST HOSP IP/OBS HIGH 75: CPT

## 2021-04-08 PROCEDURE — 85610 PROTHROMBIN TIME: CPT

## 2021-04-08 PROCEDURE — 86850 RBC ANTIBODY SCREEN: CPT

## 2021-04-08 PROCEDURE — 93970 EXTREMITY STUDY: CPT

## 2021-04-08 PROCEDURE — C1894: CPT

## 2021-04-08 PROCEDURE — 97112 NEUROMUSCULAR REEDUCATION: CPT

## 2021-04-08 PROCEDURE — 80202 ASSAY OF VANCOMYCIN: CPT

## 2021-04-08 PROCEDURE — C1769: CPT

## 2021-04-08 PROCEDURE — 36600 WITHDRAWAL OF ARTERIAL BLOOD: CPT

## 2021-04-08 PROCEDURE — 99153 MOD SED SAME PHYS/QHP EA: CPT

## 2021-04-08 PROCEDURE — 94760 N-INVAS EAR/PLS OXIMETRY 1: CPT

## 2021-04-08 PROCEDURE — 99232 SBSQ HOSP IP/OBS MODERATE 35: CPT

## 2021-04-08 PROCEDURE — 85014 HEMATOCRIT: CPT

## 2021-04-08 RX ORDER — GABAPENTIN 400 MG/1
1 CAPSULE ORAL
Qty: 0 | Refills: 0 | DISCHARGE

## 2021-04-08 RX ORDER — HEPARIN SODIUM 5000 [USP'U]/ML
5000 INJECTION INTRAVENOUS; SUBCUTANEOUS EVERY 8 HOURS
Refills: 0 | Status: DISCONTINUED | OUTPATIENT
Start: 2021-04-08 | End: 2021-04-30

## 2021-04-08 RX ORDER — CEPHALEXIN 500 MG
1 CAPSULE ORAL
Qty: 4 | Refills: 0
Start: 2021-04-08 | End: 2021-04-09

## 2021-04-08 RX ORDER — ASPIRIN/CALCIUM CARB/MAGNESIUM 324 MG
1 TABLET ORAL
Qty: 0 | Refills: 0 | DISCHARGE

## 2021-04-08 RX ORDER — DILTIAZEM HCL 120 MG
1 CAPSULE, EXT RELEASE 24 HR ORAL
Qty: 0 | Refills: 0 | DISCHARGE
Start: 2021-04-08

## 2021-04-08 RX ORDER — TRAMADOL HYDROCHLORIDE 50 MG/1
25 TABLET ORAL EVERY 6 HOURS
Refills: 0 | Status: DISCONTINUED | OUTPATIENT
Start: 2021-04-08 | End: 2021-04-08

## 2021-04-08 RX ORDER — GLUCAGON INJECTION, SOLUTION 0.5 MG/.1ML
1 INJECTION, SOLUTION SUBCUTANEOUS ONCE
Refills: 0 | Status: DISCONTINUED | OUTPATIENT
Start: 2021-04-08 | End: 2021-04-30

## 2021-04-08 RX ORDER — PANTOPRAZOLE SODIUM 20 MG/1
1 TABLET, DELAYED RELEASE ORAL
Qty: 0 | Refills: 0 | DISCHARGE
Start: 2021-04-08

## 2021-04-08 RX ORDER — LANOLIN ALCOHOL/MO/W.PET/CERES
6 CREAM (GRAM) TOPICAL AT BEDTIME
Refills: 0 | Status: DISCONTINUED | OUTPATIENT
Start: 2021-04-08 | End: 2021-04-30

## 2021-04-08 RX ORDER — TAMSULOSIN HYDROCHLORIDE 0.4 MG/1
0.4 CAPSULE ORAL AT BEDTIME
Refills: 0 | Status: DISCONTINUED | OUTPATIENT
Start: 2021-04-08 | End: 2021-04-30

## 2021-04-08 RX ORDER — DEXTROSE 50 % IN WATER 50 %
15 SYRINGE (ML) INTRAVENOUS ONCE
Refills: 0 | Status: DISCONTINUED | OUTPATIENT
Start: 2021-04-08 | End: 2021-04-30

## 2021-04-08 RX ORDER — DEXTROSE 50 % IN WATER 50 %
25 SYRINGE (ML) INTRAVENOUS ONCE
Refills: 0 | Status: DISCONTINUED | OUTPATIENT
Start: 2021-04-08 | End: 2021-04-30

## 2021-04-08 RX ORDER — CEPHALEXIN 500 MG
500 CAPSULE ORAL EVERY 12 HOURS
Refills: 0 | Status: COMPLETED | OUTPATIENT
Start: 2021-04-08 | End: 2021-04-10

## 2021-04-08 RX ORDER — SODIUM CHLORIDE 9 MG/ML
1000 INJECTION, SOLUTION INTRAVENOUS
Refills: 0 | Status: DISCONTINUED | OUTPATIENT
Start: 2021-04-08 | End: 2021-04-30

## 2021-04-08 RX ORDER — POLYETHYLENE GLYCOL 3350 17 G/17G
17 POWDER, FOR SOLUTION ORAL DAILY
Refills: 0 | Status: DISCONTINUED | OUTPATIENT
Start: 2021-04-08 | End: 2021-04-30

## 2021-04-08 RX ORDER — INSULIN LISPRO 100/ML
VIAL (ML) SUBCUTANEOUS AT BEDTIME
Refills: 0 | Status: DISCONTINUED | OUTPATIENT
Start: 2021-04-08 | End: 2021-04-11

## 2021-04-08 RX ORDER — DULOXETINE HYDROCHLORIDE 30 MG/1
60 CAPSULE, DELAYED RELEASE ORAL DAILY
Refills: 0 | Status: DISCONTINUED | OUTPATIENT
Start: 2021-04-08 | End: 2021-04-30

## 2021-04-08 RX ORDER — ERYTHROPOIETIN 10000 [IU]/ML
10000 INJECTION, SOLUTION INTRAVENOUS; SUBCUTANEOUS
Refills: 0 | Status: DISCONTINUED | OUTPATIENT
Start: 2021-04-08 | End: 2021-04-30

## 2021-04-08 RX ORDER — DEXTROSE 50 % IN WATER 50 %
12.5 SYRINGE (ML) INTRAVENOUS ONCE
Refills: 0 | Status: DISCONTINUED | OUTPATIENT
Start: 2021-04-08 | End: 2021-04-30

## 2021-04-08 RX ORDER — DILTIAZEM HCL 120 MG
240 CAPSULE, EXT RELEASE 24 HR ORAL DAILY
Refills: 0 | Status: DISCONTINUED | OUTPATIENT
Start: 2021-04-08 | End: 2021-04-13

## 2021-04-08 RX ORDER — SENNA PLUS 8.6 MG/1
2 TABLET ORAL AT BEDTIME
Refills: 0 | Status: DISCONTINUED | OUTPATIENT
Start: 2021-04-08 | End: 2021-04-30

## 2021-04-08 RX ORDER — SENNA PLUS 8.6 MG/1
2 TABLET ORAL
Qty: 0 | Refills: 0 | DISCHARGE
Start: 2021-04-08

## 2021-04-08 RX ORDER — CLOPIDOGREL BISULFATE 75 MG/1
75 TABLET, FILM COATED ORAL DAILY
Refills: 0 | Status: DISCONTINUED | OUTPATIENT
Start: 2021-04-08 | End: 2021-04-30

## 2021-04-08 RX ORDER — PANTOPRAZOLE SODIUM 20 MG/1
40 TABLET, DELAYED RELEASE ORAL EVERY 12 HOURS
Refills: 0 | Status: DISCONTINUED | OUTPATIENT
Start: 2021-04-08 | End: 2021-04-08

## 2021-04-08 RX ORDER — ACETAMINOPHEN 500 MG
650 TABLET ORAL EVERY 6 HOURS
Refills: 0 | Status: DISCONTINUED | OUTPATIENT
Start: 2021-04-08 | End: 2021-04-30

## 2021-04-08 RX ORDER — PANTOPRAZOLE SODIUM 20 MG/1
40 TABLET, DELAYED RELEASE ORAL
Refills: 0 | Status: DISCONTINUED | OUTPATIENT
Start: 2021-04-08 | End: 2021-04-30

## 2021-04-08 RX ORDER — HEPARIN SODIUM 5000 [USP'U]/ML
5000 INJECTION INTRAVENOUS; SUBCUTANEOUS
Qty: 0 | Refills: 0 | DISCHARGE
Start: 2021-04-08

## 2021-04-08 RX ORDER — ACETAMINOPHEN 500 MG
2 TABLET ORAL
Qty: 0 | Refills: 0 | DISCHARGE
Start: 2021-04-08

## 2021-04-08 RX ORDER — HYDRALAZINE HCL 50 MG
25 TABLET ORAL
Refills: 0 | Status: DISCONTINUED | OUTPATIENT
Start: 2021-04-08 | End: 2021-04-11

## 2021-04-08 RX ORDER — POLYETHYLENE GLYCOL 3350 17 G/17G
17 POWDER, FOR SOLUTION ORAL
Qty: 0 | Refills: 0 | DISCHARGE
Start: 2021-04-08

## 2021-04-08 RX ORDER — TAMSULOSIN HYDROCHLORIDE 0.4 MG/1
1 CAPSULE ORAL
Qty: 0 | Refills: 0 | DISCHARGE
Start: 2021-04-08

## 2021-04-08 RX ORDER — INSULIN LISPRO 100/ML
VIAL (ML) SUBCUTANEOUS
Refills: 0 | Status: DISCONTINUED | OUTPATIENT
Start: 2021-04-08 | End: 2021-04-11

## 2021-04-08 RX ADMIN — Medication 6 MILLIGRAM(S): at 23:24

## 2021-04-08 RX ADMIN — Medication 240 MILLIGRAM(S): at 05:28

## 2021-04-08 RX ADMIN — Medication 25 MILLIGRAM(S): at 20:16

## 2021-04-08 RX ADMIN — Medication 650 MILLIGRAM(S): at 05:28

## 2021-04-08 RX ADMIN — Medication 500 MILLIGRAM(S): at 20:16

## 2021-04-08 RX ADMIN — HEPARIN SODIUM 5000 UNIT(S): 5000 INJECTION INTRAVENOUS; SUBCUTANEOUS at 05:28

## 2021-04-08 RX ADMIN — TAMSULOSIN HYDROCHLORIDE 0.4 MILLIGRAM(S): 0.4 CAPSULE ORAL at 22:16

## 2021-04-08 RX ADMIN — PANTOPRAZOLE SODIUM 40 MILLIGRAM(S): 20 TABLET, DELAYED RELEASE ORAL at 05:27

## 2021-04-08 RX ADMIN — TRAMADOL HYDROCHLORIDE 25 MILLIGRAM(S): 50 TABLET ORAL at 08:13

## 2021-04-08 RX ADMIN — Medication 650 MILLIGRAM(S): at 00:02

## 2021-04-08 RX ADMIN — PANTOPRAZOLE SODIUM 40 MILLIGRAM(S): 20 TABLET, DELAYED RELEASE ORAL at 20:16

## 2021-04-08 RX ADMIN — Medication 650 MILLIGRAM(S): at 06:49

## 2021-04-08 RX ADMIN — Medication 25 MILLIGRAM(S): at 05:29

## 2021-04-08 RX ADMIN — HEPARIN SODIUM 5000 UNIT(S): 5000 INJECTION INTRAVENOUS; SUBCUTANEOUS at 22:15

## 2021-04-08 RX ADMIN — SENNA PLUS 2 TABLET(S): 8.6 TABLET ORAL at 22:16

## 2021-04-08 NOTE — DISCHARGE NOTE NURSING/CASE MANAGEMENT/SOCIAL WORK - PATIENT PORTAL LINK FT
You can access the FollowMyHealth Patient Portal offered by [facility] by registering at the following website: [email or web address]. By joining [facility]’s FollowMyHealth portal, you will also be able to view your health information using other applications (apps) compatible with our system.

## 2021-04-08 NOTE — H&P ADULT - NSHPOUTPATIENTPROVIDERS_GEN_ALL_CORE
Mango Champagne (MD)  Orthopaedic Surgery  [address]  [place] [id number]  Phone: [phone]  Siva Winston (MD)  Surgery; Vascular Surgery  [address], [address] [place] [address]  Phone: [phone]  Erinn Contreras ()  Gastroenterology  [address], [address]  [place] [address]  Phone: [phone]  Bay Avendano)  Internal Medicine; Nephrology  [address] [place] [address]  Phone: [phone] Siva Winston (MD)  Surgery; Vascular Surgery  [address], [address] [place] [address]  Phone: [phone]  Erinn Contreras ()  Gastroenterology  [address], [address]  [place] [address]  Phone: [phone]  Bay Avendano)  Internal Medicine; Nephrology  [address] [place] [address]  Phone: [phone]    [facility] - Orthopedics

## 2021-04-08 NOTE — H&P ADULT - NSHPLABSRESULTS_GEN_ALL_CORE
.  LABS:                         9.7    8.24  )-----------( 565      ( 08 Apr 2021 06:13 )             30.6         136 | 97 | 41  -----------------< 106      (06 Apr 2021 05:51)  3.8  | 27 | 3.74            RADIOLOGY, EKG & ADDITIONAL TESTS: Reviewed.

## 2021-04-08 NOTE — H&P ADULT - NSHPSOCIALHISTORY_GEN_ALL_CORE
SOCIAL HISTORY  - Smoking: denied  - Alcohol: denied  - Drug Use: denied    FUNCTIONAL HISTORY  Pt lives with his spouse. There are 3 steps to enter the house with no stairs inside. Prior to admission, pt was independent with ADLs and ambulated with a rolling walker.    CURRENT FUNCTIONAL STATUS  4/8  Sit-Stand Transfer Training  Sit-to-Stand Transfer Training Rehab Potential: good, to achieve stated therapy goals  Transfer Training Sit-to-Stand Transfer: moderate assist (50% patient effort);  1 person assist;  rolling walker  Transfer Training Stand-to-Sit Transfer: moderate assist (50% patient effort);  1 person assist;  rolling walker  Sit-to-Stand Transfer Training Transfer Safety Analysis: decreased weight-shifting ability;  decreased strength;  impaired balance    Gait Training  Gait Training Rehab Potential: good, to achieve stated therapy goals  Gait Training: moderate assist (50% patient effort);  1 person assist;  rolling walker;  5 feet  Gait Analysis: decreased step length;  decreased stride length;  decreased strength;  impaired balance;  decreased ROM;  difficulty extending left knee to ext for foot flat    Therapeutic Exercise  Therapeutic Exercise Rehab Effort: good  Therapeutic Exercise Detail: Pt performs Jay UE/LE AROM/AAROM/PROM, gentle stretching in functional planes

## 2021-04-08 NOTE — PROGRESS NOTE ADULT - NSICDXPILOT_GEN_ALL_CORE
[place]

## 2021-04-08 NOTE — PROGRESS NOTE ADULT - PROBLEM SELECTOR PLAN 1
resolved . hemoglobin stable  Pt is high risk for bleeding, however if pt absolutely needs anticoagulation, start one agent monitor for rebleeding.

## 2021-04-08 NOTE — PROGRESS NOTE ADULT - TIME BILLING
reviwed previous EGD and colon reports, labs and progress  notes.
Reviewed cardiology and vascular surgery notes , labs

## 2021-04-08 NOTE — H&P ADULT - HISTORY OF PRESENT ILLNESS
Pt is a 87 y/o M with PMHx of HTN, HLD, CAD, HFpEF, s/p Right CEA for Carotid artery disease, ESRD on HD 2d/week (M/Fri) via LUE fistula, s/p flecainide w/ ILR placed 6/2020, AS s/p TAVR, and PAD who presented to [facility] on 3/13/21 with GI bleed, as well as LLE pain. 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 When patient was stable from GI, medical, cardiac standpoint, he went for LLE angiogram on 3/18, and found occlusion of L Superficial femoral artery. He was planned for a LLE fem-pop bypass, and had the bypass performed on 3/20. On 3/22, patient was a RRT for AMS, fever. Work-up revealed pleural effusions and L femoral DVT. He was started on abx for presumed pna and hep gtt for DVT. Patient began to show signs of malperfusion with coolness to touch, mottling of skin to LLE. He was planned for revision of LLE bypass. Went to OR on 3/25 for LLE bypass revision with explant of PTFE bypass, CFA to AT bypass with cryovein performed. Intra-op patient with 1500 ml of blood loss requiring 8 unit PRBC, 4 unit FFP, 1 donor unit plt.  During procedure, pt lost function of PPM with period of asystole requiring transcutaneous pacing. Cardiology consulted and pacemaker interrogated in the OR.  Pt taken to PACU post op and SICU consulted. Patient went to SICU post-op. On 3/27 patient was extubated and was downgraded from SICU to floor the next day. Rest of hospital course was uneventful. He worked with PT, was evaluated by PM&R and approved for acute rehab. The patient remained on heparin sq while in the hospital for tx of dvt (his plavix was held). Upon discharge, approved by [facility] and Dr. Albrecht to resume plavix. He will remain on heparin sq while at rehab for dvt tx and ppx. He is not a candidate for IVC filter. Upon discharge, pain is well controlled, tolerating diet, remains HD stable, no clinical signs/symptoms of GI bleed, LLE remains warm and well perfused, he has a strong LLE palpable graft pulse and dopplerable signals. Per attending, he is medically stable for discharge to [facility] and on 4/8/21 he was transferred to [facility] for acute inpatient rehabilitation.         Pt is a 87 y/o M with PMHx of HTN, HLD, CAD, HFpEF, s/p Right CEA for Carotid artery disease, ESRD on HD 2d/week (M/Fri) via LUE fistula, s/p flecainide w/ ILR placed 6/2020, AS s/p TAVR, and PAD (RLE fem-pop bypass October 2020) who presented to [facility] on 3/13/21 with GI bleed, as well as LLE pain. 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 When patient was stable from GI, medical, cardiac standpoint, he went for LLE angiogram on 3/18, and found occlusion of L Superficial femoral artery. He was planned for a LLE fem-pop bypass, and had the bypass performed on 3/20. On 3/22, patient was a RRT for AMS, fever. Work-up revealed pleural effusions and L femoral DVT. He was started on abx for presumed pna and hep gtt for DVT. Patient began to show signs of malperfusion with coolness to touch, mottling of skin to LLE. He was planned for revision of LLE bypass. Went to OR on 3/25 for LLE bypass revision with explant of PTFE bypass, CFA to AT bypass with cryovein performed. Intra-op patient with 1500 ml of blood loss requiring 8 unit PRBC, 4 unit FFP, 1 donor unit plt.  During procedure, pt lost function of PPM with period of asystole requiring transcutaneous pacing. Cardiology consulted and pacemaker interrogated in the OR.  Pt taken to PACU post op and SICU consulted. Patient went to SICU post-op. On 3/27 patient was extubated and was downgraded from SICU to floor the next day. Rest of hospital course was uneventful. He worked with PT, was evaluated by PM&R and approved for acute rehab. The patient remained on heparin sq while in the hospital for tx of dvt (his plavix was held). Upon discharge, approved by [facility] and Dr. Albrecht to resume plavix. He will remain on heparin sq while at rehab for dvt tx and ppx. He is not a candidate for IVC filter. Upon discharge, pain is well controlled, tolerating diet, remains HD stable, no clinical signs/symptoms of GI bleed, LLE remains warm and well perfused, he has a strong LLE palpable graft pulse and dopplerable signals. Per attending, he is medically stable for discharge to [facility] and on 4/8/21 he was transferred to [facility] for acute inpatient rehabilitation.

## 2021-04-08 NOTE — H&P ADULT - NSHPPHYSICALEXAM_GEN_ALL_CORE
Constitutional - NAD, Comfortably lying in bed  HEENT - NCAT, EOMI  Neck - Supple, No limited ROM  Chest - Breathing comfortably, No wheezing, clear to auscultation bilaterally  Cardiovascular - S1S2, RRR  Abdomen - Soft, nontender, nondistended, normoactive bowel sounds  Extremities - LLE with 2 incision sites, lateral and medial calf. Both sites with bandage with mild discharge, no juan bleeding. LLE tender to palpation at incision site, +edema. RLE +edema (previous bypass). LUE with AV fistula with good thrill  Neurologic Exam -                    Cognitive - Awake, Alert, AAO to self, place, date, year, situation     Communication - Fluent, No dysarthria     Cranial Nerves - CN 2-12 intact     Motor -                     LEFT    UE - ShAB 5/5, EF 5/5, EE 5/5, WE 5/5,  5/5                    RIGHT UE - ShAB 5/5, EF 5/5, EE 5/5, WE 5/5,  5/5                    LEFT    LE - HF 3/5, KE 3/5, DF 4/5, PF 4/5                    RIGHT LE - HF 4/5, KE 4/5, DF 4/5, PF 4/5        Sensory - Intact to LT  Psychiatric - Mood stable, a little anxious, Affect WNL Constitutional - NAD, Comfortably lying in bed  HEENT - NCAT, EOMI  Chest - Breathing comfortably, No wheezing, clear to auscultation bilaterally  Cardiovascular - S1S2, RRR  Abdomen - Soft, nontender, nondistended, normoactive bowel sounds  Extremities - LLE with 2 incision sites, lateral and medial calf. Both sites with bandage with mild discharge, no juan bleeding. LLE tender to palpation at incision site, +edema. RLE +edema (previous bypass). 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, left heel with area of DTI- small pinpoint, right heel with area of DTI,   Neurologic Exam -                    Cognitive - Awake, Alert, AAO to self, place, date, year, situation     Communication - Fluent, No dysarthria     Cranial Nerves - CN 2-12 intact     Motor -                     LEFT    UE - ShAB 5/5, EF 5/5, EE 5/5, WE 5/5,  5/5                    RIGHT UE - ShAB 5/5, EF 5/5, EE 5/5, WE 5/5,  5/5                    LEFT    LE - HF 3/5, KE 3/5, DF 4/5, PF 4/5                    RIGHT LE - HF 4/5, KE 4/5, DF 4/5, PF 4/5        Sensory - Intact to LT  Psychiatric - Mood stable, a little anxious, Affect WNL  Skin: Left groin and left lateral thigh incisions with staples + Calf with bilateral incisions with island dressing +  Decubitus: Sacrum and bilateral buttocks with unstageable ulcer and areas of skin flaking off Constitutional - NAD, Comfortably lying in bed  HEENT - NCAT, EOMI  Chest - Breathing comfortably, No wheezing, clear to auscultation bilaterally  Cardiovascular - S1S2, RRR  Abdomen - Soft, nontender, nondistended, normoactive bowel sounds  Extremities - LLE with 2 incision sites, lateral and medial calf. Both sites with bandage with mild discharge, no juan bleeding. LLE tender to palpation at incision site, +edema. RLE +edema (previous bypass). 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, Pressure induced -  left heel with area of DTI- small pinpoint, right heel with area of DTI,   Neurologic Exam -                    Cognitive - Awake, Alert, AAO to self, place, date, year, situation     Communication - Fluent, No dysarthria     Cranial Nerves - CN 2-12 intact     Motor -                     LEFT    UE - ShAB 5/5, EF 5/5, EE 5/5, WE 5/5,  5/5                    RIGHT UE - ShAB 5/5, EF 5/5, EE 5/5, WE 5/5,  5/5                    LEFT    LE - HF 3/5, KE 3/5, DF 4/5, PF 4/5                    RIGHT LE - HF 4/5, KE 4/5, DF 4/5, PF 4/5        Sensory - Intact to LT  Psychiatric - Mood stable, a little anxious, Affect WNL  Skin: Left groin and left lateral thigh incisions with staples + Calf with bilateral incisions with island dressing +  Decubitus: Sacrum and bilateral buttocks with unstageable ulcer and areas of skin flaking off Constitutional - NAD, Comfortably lying in bed  HEENT - NCAT, EOMI  Chest - Breathing comfortably, No wheezing, clear to auscultation bilaterally  Cardiovascular - S1S2, RRR  Abdomen - Soft, nontender, nondistended, normoactive bowel sounds  Extremities - LLE with 2 incision sites, lateral and medial calf. Both sites with bandage with mild discharge, no juan bleeding. LLE tender to palpation at incision site, +edema. RLE +edema (previous bypass). 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, Pressure induced -  right heel with area of DTI- small pinpoint, left heel with area of DTI, and area of eschar   Neurologic Exam -                    Cognitive - Awake, Alert, AAO to self, place, date, year, situation     Communication - Fluent, No dysarthria     Cranial Nerves - CN 2-12 intact     Motor -                     LEFT    UE - ShAB 5/5, EF 5/5, EE 5/5, WE 5/5,  5/5                    RIGHT UE - ShAB 5/5, EF 5/5, EE 5/5, WE 5/5,  5/5                    LEFT    LE - HF 3/5, KE 3/5, DF 4/5, PF 4/5                    RIGHT LE - HF 4/5, KE 4/5, DF 4/5, PF 4/5        Sensory - Intact to LT  Psychiatric - Mood stable, a little anxious, Affect WNL  Skin: Left groin and left lateral thigh incisions with staples + Calf with bilateral incisions with island dressing +  Decubitus: Sacrum and bilateral buttocks with unstageable ulcer and areas of skin flaking off

## 2021-04-08 NOTE — PROGRESS NOTE ADULT - PROVIDER SPECIALTY LIST ADULT
Cardiology
Nephrology
Vascular Surgery
Cardiology
Gastroenterology
Hospitalist
Nephrology
Vascular Surgery
Anesthesia
Cardiology
Gastroenterology
Gastroenterology
Nephrology
Rehab Medicine
SICU
Vascular Surgery
Cardiology
Cardiology
Hospitalist
Nephrology
Vascular Surgery
Cardiology
Cardiology
Gastroenterology
Gastroenterology
Nephrology
Vascular Surgery
Cardiology

## 2021-04-08 NOTE — STUDENT SIGN OFF DOCUMENT - DOCUMENTS STUDENTS ARE SIGNED OFF ON
[facility] Nursing Student TK Primary RN aware Abi S/Vital Signs
Vital Signs
Vital Signs/Input and Output/Plan of Care/Assessment and Intervention

## 2021-04-08 NOTE — PROGRESS NOTE ADULT - PROBLEM SELECTOR PROBLEM 1
PAD (peripheral artery disease)
Rectal bleeding
Rectal bleeding
Anemia, unspecified type
Rectal bleeding
Rectal bleeding

## 2021-04-08 NOTE — H&P ADULT - NSHPREVIEWOFSYSTEMS_GEN_ALL_CORE
REVIEW OF SYSTEMS:    CONSTITUTIONAL: No fevers or chills  EYES/ENT: No visual changes;  No vertigo or throat pain   NECK: No pain or stiffness  RESPIRATORY: No cough, wheezing, or shortness of breath  CARDIOVASCULAR: No chest pain or palpitations  GASTROINTESTINAL: No abdominal or epigastric pain. No nausea or vomiting. No diarrhea or constipation.   GENITOURINARY: No dysuria, frequency or hematuria  NEUROLOGICAL: No numbness, +weakness  SKIN: No itching, rashes, +incisional pain  PSYCH: +anxiety REVIEW OF SYSTEMS:    CONSTITUTIONAL: No fevers or chills  EYES/ENT: No visual changes;    NECK: No pain or stiffness  RESPIRATORY: No cough, wheezing, or shortness of breath  CARDIOVASCULAR: No chest pain or palpitations  GASTROINTESTINAL: No abdominal or epigastric pain. No nausea or vomiting. No diarrhea or constipation.   GENITOURINARY: No dysuria,   NEUROLOGICAL: No numbness, +weakness  SKIN: No itching, rashes, +incisional pain  PSYCH: +anxiety

## 2021-04-08 NOTE — PROGRESS NOTE ADULT - REASON FOR ADMISSION
symptomatic anemia

## 2021-04-08 NOTE — CHART NOTE - NSCHARTNOTEFT_GEN_A_CORE
Called by nurse and informed that patient with low grade fever 100.8F. Received PO Tylenol 2 hours prior, next scheduled dose not until 4 more hours. Will give 200 mg Ibuprofen as additional antipyretic. Understand risks and benefits given hx of GI bleed.
Called the patient's family to discuss current clinical status and all questions were answered.
Postoperative Exam    SUBJECTIVE: Patient evaluated at bedside, found resting comfortably in bed, nad. No acute complaints or concerns. Continues to have pain at left foot, medications adjusted. No active bleeding. Denies sob, chest pain, n/v, f/c.       MEDICATIONS  (STANDING):  acetaminophen   Tablet .. 975 milliGRAM(s) Oral every 6 hours  atorvastatin 80 milliGRAM(s) Oral at bedtime  chlorhexidine 4% Liquid 1 Application(s) Topical <User Schedule>  clopidogrel Tablet 75 milliGRAM(s) Oral daily  DULoxetine 60 milliGRAM(s) Oral daily  epoetin juan-epbx (RETACRIT) Injectable 60595 Unit(s) IV Push <User Schedule>  gabapentin 100 milliGRAM(s) Oral two times a day  heparin   Injectable 5000 Unit(s) SubCutaneous every 8 hours  hydrALAZINE 50 milliGRAM(s) Oral two times a day  isosorbide   mononitrate ER Tablet (IMDUR) 30 milliGRAM(s) Oral daily  lactated ringers. 1000 milliLiter(s) (75 mL/Hr) IV Continuous <Continuous>  Nephro-pito 1 Tablet(s) Oral daily  NIFEdipine XL 90 milliGRAM(s) Oral daily  NIFEdipine XL 60 milliGRAM(s) Oral <User Schedule>  pantoprazole    Tablet 40 milliGRAM(s) Oral every 12 hours  torsemide 20 milliGRAM(s) Oral <User Schedule>    MEDICATIONS  (PRN):  fentaNYL    Injectable 25 MICROGram(s) IV Push every 5 minutes PRN Moderate Pain (4 - 6)  oxyCODONE    IR 5 milliGRAM(s) Oral every 6 hours PRN Moderate Pain (4 - 6)  oxyCODONE    IR 10 milliGRAM(s) Oral every 6 hours PRN Severe Pain (7 - 10)      Vital Signs Last 24 Hrs  T(C): 37 (20 Mar 2021 13:30), Max: 37.8 (20 Mar 2021 11:22)  T(F): 98.6 (20 Mar 2021 13:30), Max: 100.1 (20 Mar 2021 11:22)  HR: 68 (20 Mar 2021 13:30) (56 - 77)  BP: 132/53 (20 Mar 2021 13:30) (120/70 - 166/58)  BP(mean): --  RR: 16 (20 Mar 2021 13:30) (12 - 18)  SpO2: 97% (20 Mar 2021 13:30) (92% - 98%)    PE  Gen: resting comfortably in bed, nad  Pulm: no increased wob, no conversational dyspnea  Abd: non-distended, soft, non-tender  Ext: dusky discoloration to distal aspect of left foot/toes, monophasic AT pulse at left foot. incisions with dressings in place c/d/i.        I&O's Detail    19 Mar 2021 07:01  -  20 Mar 2021 07:00  --------------------------------------------------------  IN:    Other (mL): 800 mL  Total IN: 800 mL    OUT:    Other (mL): 1800 mL  Total OUT: 1800 mL    Total NET: -1000 mL          LABS:                        10.8   6.22  )-----------( 208      ( 20 Mar 2021 06:27 )             35.7     03-20    137  |  96<L>  |  33.0<H>  ----------------------------<  90  4.0   |  28.0  |  4.05<H>    Ca    9.0      20 Mar 2021 06:27  Phos  3.4     03-19  Mg     2.0     03-19      PT/INR - ( 19 Mar 2021 17:09 )   PT: 12.8 sec;   INR: 1.11 ratio         PTT - ( 19 Mar 2021 17:09 )  PTT:36.0 sec      86yMale presenting with severe anemia presenting with rest pain of LLE. Severe plaque burden of LLE on angiogram, unable to cross with wire. Now s/p L CFA to below knee pop bypass w PTFE graft. Monophasic L AT postoperatively. Will require bedrest postop. Pain medications adjusted    PLAN:    - strict bedrest  -oral pain control as needed  -continue Plavix, stat dose given in PACU   -Q4hr vascular exams  -Cardiac monitor postoperatively  -Postoperative EKG per cardiology recommendations
RRT Note:    RRT called for altered mental status. Patient evaluated bedside and found altered, oriented to person, not oriented to time or place. Febrile to 101.5 on rectal temperature. HD normal without hypotension or tachycardia. Denies sob or chest pain. Denies complaints at present.       MEDICATIONS  (STANDING):  acetaminophen   Tablet .. 975 milliGRAM(s) Oral every 6 hours  atorvastatin 80 milliGRAM(s) Oral at bedtime  chlorhexidine 4% Liquid 1 Application(s) Topical <User Schedule>  clopidogrel Tablet 75 milliGRAM(s) Oral daily  DULoxetine 60 milliGRAM(s) Oral daily  epoetin juan-epbx (RETACRIT) Injectable 78108 Unit(s) IV Push <User Schedule>  heparin   Injectable 5000 Unit(s) SubCutaneous every 8 hours  hydrALAZINE 50 milliGRAM(s) Oral two times a day  isosorbide   mononitrate ER Tablet (IMDUR) 30 milliGRAM(s) Oral daily  Nephro-pito 1 Tablet(s) Oral daily  NIFEdipine XL 90 milliGRAM(s) Oral daily  NIFEdipine XL 60 milliGRAM(s) Oral <User Schedule>  pantoprazole    Tablet 40 milliGRAM(s) Oral every 12 hours  sevelamer carbonate 1600 milliGRAM(s) Oral three times a day with meals  torsemide 20 milliGRAM(s) Oral <User Schedule>    MEDICATIONS  (PRN):      Vital Signs Last 24 Hrs  T(C): 38.6 (22 Mar 2021 20:05), Max: 38.6 (22 Mar 2021 20:05)  T(F): 101.5 (22 Mar 2021 20:05), Max: 101.5 (22 Mar 2021 20:05)  HR: 59 (22 Mar 2021 20:05) (56 - 99)  BP: 132/52 (22 Mar 2021 20:05) (132/52 - 175/53)  BP(mean): --  RR: 20 (22 Mar 2021 20:05) (18 - 20)  SpO2: 94% (22 Mar 2021 20:05) (92% - 96%)    PE  Gen: resting comfortably in bed, confused but answering questions appropriately  Card: RRR  Pulm: no increased wob, no conversational dyspnea, Saturating >90 on 3L NC  Abd: non-distended, soft, non-tender  Ext: L groin with stable hematoma soft, L calf tense compared to left  Neuro: 2mm pupils equil reactive b/l, no motor deficits at b/l UE or LE        I&O's Detail    21 Mar 2021 07:01  -  22 Mar 2021 07:00  --------------------------------------------------------  IN:    Oral Fluid: 120 mL  Total IN: 120 mL    OUT:  Total OUT: 0 mL    Total NET: 120 mL      22 Mar 2021 07:01  -  22 Mar 2021 20:34  --------------------------------------------------------  IN:  Total IN: 0 mL    OUT:    Other (mL): 500 mL  Total OUT: 500 mL    Total NET: -500 mL          LABS:                        9.3    15.75 )-----------( 234      ( 22 Mar 2021 19:45 )             29.5     03-22    132<L>  |  92<L>  |  26.0<H>  ----------------------------<  231<H>  4.3   |  24.0  |  3.53<H>    Ca    8.6      22 Mar 2021 19:45  Phos  7.8     03-22  Mg     2.0     03-22          A/P:    -given concern for PE, will obtain CTA. Nephrology on board, agree that benefit of scan outway risks of contrast at this time. Will plan for HD again tomorrow in setting of contrast  -BCx  -will start broad spectrum abx  -Retaining >400 on bladder scan, straight cathed and cultures sent  -CT head to rule out acute stroke    Patient seen and examined with senior resident and plan discussed with attending who is in agreement
S/p RLE angiogram with unsuccessful angioplasty due to severe plaque burden    SUBJECTIVE: Patient evaluated at bedside, found resting comfortably in bed, nad. No acute complaints or concerns. Denies pain at present. Vitals appropriate with no evidence of tachycardia or hypotension. Denies sob, chest pain, n/v, f/c.       MEDICATIONS  (STANDING):  atorvastatin 80 milliGRAM(s) Oral at bedtime  chlorhexidine 4% Liquid 1 Application(s) Topical <User Schedule>  clopidogrel Tablet 75 milliGRAM(s) Oral daily  DULoxetine 60 milliGRAM(s) Oral daily  epoetin juan-epbx (RETACRIT) Injectable 95217 Unit(s) IV Push <User Schedule>  gabapentin 100 milliGRAM(s) Oral two times a day  heparin   Injectable 5000 Unit(s) SubCutaneous every 8 hours  hydrALAZINE 50 milliGRAM(s) Oral two times a day  isosorbide   mononitrate ER Tablet (IMDUR) 30 milliGRAM(s) Oral daily  Nephro-pito 1 Tablet(s) Oral daily  NIFEdipine XL 90 milliGRAM(s) Oral daily  NIFEdipine XL 60 milliGRAM(s) Oral <User Schedule>  pantoprazole    Tablet 40 milliGRAM(s) Oral every 12 hours  torsemide 20 milliGRAM(s) Oral <User Schedule>    MEDICATIONS  (PRN):  acetaminophen   Tablet .. 650 milliGRAM(s) Oral every 6 hours PRN Temp greater or equal to 38C (100.4F), Mild Pain (1 - 3)      Vital Signs Last 24 Hrs  T(C): 36.4 (17 Mar 2021 11:20), Max: 36.9 (16 Mar 2021 22:10)  T(F): 97.6 (17 Mar 2021 11:20), Max: 98.5 (16 Mar 2021 22:10)  HR: 73 (17 Mar 2021 11:20) (63 - 79)  BP: 168/63 (17 Mar 2021 11:20) (142/53 - 168/63)  BP(mean): --  RR: 18 (17 Mar 2021 11:20) (16 - 18)  SpO2: 93% (17 Mar 2021 11:20) (93% - 100%)    PE  Gen: resting comfortably in bed, nad  Pulm: no increased wob, no conversational dyspnea  Abd: non-distended, soft, non-tender  Ext: distal extremities warm, no peripheral edema, dopplerable b/l DP, right groin with dressing in place c/d/i without strikethrough, no evidence of hematoma, no ecchymosis, no active bleeding. Right thigh soft.         I&O's Detail      LABS:                        10.0   6.06  )-----------( 159      ( 16 Mar 2021 07:00 )             33.1     03-16    142  |  99  |  67.0<H>  ----------------------------<  98  3.9   |  25.0  |  5.19<H>    Ca    9.1      16 Mar 2021 07:00            A/P:     Patient is a 86y old m with severe anemia presenting with rest pain of LLE. Severe plaque burden of LLE on angiogram today, unable to cross with wire. Will need operative intervention and bypass for treatment of rest pain.     PLAN:    - transfer patient to vascular surgery service  - please document medical and cardiac clearance for lower extremity bypass  - plan for operative intervention this weekend
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 650 milliGRAM(s) Oral every 6 hours  clopidogrel Tablet 75 milliGRAM(s) Oral daily  dextrose 5%. 1000 milliLiter(s) IV Continuous <Continuous>  dextrose 50% Injectable 25 Gram(s) IV Push once  dextrose 50% Injectable 12.5 Gram(s) IV Push once  dextrose 50% Injectable 25 Gram(s) IV Push once  glucagon  Injectable 1 milliGRAM(s) IntraMuscular once  heparin  Infusion.  Unit(s)/Hr IV Continuous <Continuous>  insulin lispro (ADMELOG) corrective regimen sliding scale   SubCutaneous every 4 hours  oxyCODONE    IR 2.5 milliGRAM(s) Oral every 3 hours PRN  pantoprazole  Injectable 40 milliGRAM(s) IV Push every 12 hours  polyethylene glycol 3350 17 Gram(s) Oral daily  senna 2 Tablet(s) Oral at bedtime  sodium chloride 0.9%. 1000 milliLiter(s) IV Continuous <Continuous>      [X] I attest I have reviewed and reconciled all medications prior to transfer    IV Fluids  sodium chloride 0.9%.: Solution, 1000 milliLiter(s) infuse at 50 mL/Hr  Provider's Contact #: [phone]  sodium chloride:   1 Gram(s), Oral, two times a day, Stop After 3 Days  Provider's Contact #: [phone]  lactated ringers.: Solution, 1000 milliLiter(s) infuse at 75 mL/Hr    Indication: Poor oral intake    Antibiotics: None      I have discussed this case with Hallie Solorio upon transfer and all questions regarding ICU course were answered.  The following items are to be followed up:  Delirium: Supportive care  ESRD: Nephrology following and planning for HD tomorrow  IVL when pt has adequate oral intake  Continue to trend CKs as indicated  Continued glycemic control
Source: Patient [ ]  Family [ ]   other [x ]    Current Diet: Diet, DASH/TLC:   Sodium & Cholesterol Restricted  Dysphagia 1, Pureed, Honey Consistency Fluid (DYS1HC) (03-27-21 @ 10:16)    Current Weight:   (3/26) 167.1 lbs  (3/25) 165 lbs  (3/24) 169.5 lbs  (3/23) 165.7 lbs  (3/22) 167.6 lbs  (3/21) 167.5 lbs  (3/20) 168 lbs  (3/19) 167.9 lbs  (3/18) 172.1 lbs  (3/17) 171.9 lbs  (3/16) 171 lbs  (3/15) 180.3 lbs  (3/14) 160.7 lbs  ? accuracy of weights, noted with 1+ left leg and left foot edema, continue to trend and maintain strict Is&Os     Pertinent Medications: MEDICATIONS  (STANDING):  clopidogrel Tablet 75 milliGRAM(s) Oral daily  dexMEDEtomidine Infusion 0.3 MICROgram(s)/kG/Hr (5.27 mL/Hr) IV Continuous <Continuous>  dextrose 5%. 1000 milliLiter(s) (100 mL/Hr) IV Continuous <Continuous>  dextrose 50% Injectable 12.5 Gram(s) IV Push once  dextrose 50% Injectable 25 Gram(s) IV Push once  dextrose 50% Injectable 25 Gram(s) IV Push once  glucagon  Injectable 1 milliGRAM(s) IntraMuscular once  heparin  Infusion.  Unit(s)/Hr (12 mL/Hr) IV Continuous <Continuous>  insulin lispro (ADMELOG) corrective regimen sliding scale   SubCutaneous every 4 hours  pantoprazole  Injectable 40 milliGRAM(s) IV Push every 12 hours  phenylephrine    Infusion 0.3 MICROgram(s)/kG/Min (7.91 mL/Hr) IV Continuous <Continuous>  sodium chloride 0.9%. 1000 milliLiter(s) (50 mL/Hr) IV Continuous <Continuous>    MEDICATIONS  (PRN):  acetaminophen  IVPB .. 1000 milliGRAM(s) IV Intermittent once PRN Mild Pain (1 - 3), Moderate Pain (4 - 6), Severe Pain (7 - 10)    Pertinent Labs: CBC Full  -  ( 27 Mar 2021 08:11 )  WBC Count : 15.87 K/uL  RBC Count : 2.65 M/uL  Hemoglobin : 8.1 g/dL  Hematocrit : 23.4 %  Platelet Count - Automated : 179 K/uL  Mean Cell Volume : 88.3 fl  Mean Cell Hemoglobin : 30.6 pg  Mean Cell Hemoglobin Concentration : 34.6 gm/dL  Auto Neutrophil # : x  Auto Lymphocyte # : x  Auto Monocyte # : x  Auto Eosinophil # : x  Auto Basophil # : x  Auto Neutrophil % : x  Auto Lymphocyte % : x  Auto Monocyte % : x  Auto Eosinophil % : x  Auto Basophil % : x    03-27 Na139 mmol/L Glu 141 mg/dL<H> K+ 4.2 mmol/L Cr  3.66 mg/dL<H> BUN 35.0 mg/dL<H> Phos 4.9 mg/dL<H> Alb n/a   PAB n/a       Skin: Surgical incision to Right groin and Left anterior calf per documentation    Nutrition focused physical exam previously conducted - found signs of malnutrition [ ]absent [x ]present    Subcutaneous fat loss: SEVERE [x ] Orbital fat pads region, [ x]Buccal fat region, [ ]Triceps region,  [ ]Ribs region    Muscle wasting: SEVERE [x ]Temples region, [x ]Clavicle region, [x ]Shoulder region, [ ]Scapula region,  MODERATE [ x]Interosseous region,  [ x]thigh region, [ ]Calf region    Current Nutrition Diagnosis: Pt remains at nutrition risk secondary to malnutrition (severe, acute on chronic) related to inability to consume sufficient protein energy intake with multiple hospitalizations, ESRD on HD, HF,  GI bleed clipped gastric AVMs, thrombosed L CFA to below knee pop bypass s/p  LLE bypass revision w explant of PTFE bypass, CFA to AT bypass w cryovein complicated by acute blood loss anemia and loss of PPM function intra-op as evidenced by meeting <75% estimated energy intake > 1 month, muscle loss of temples, clavicles, shoulders, interosseous region, and thigh region, and fat loss of orbitals and buccal pads. Pt required intubation for procedure, now extubated this morning. Pt remains confused. Aware seen by SLP this morning with recommendations for a pureed diet with honey thick liquids. RD to follow up.    Recommendations:   1) Continue diet as tolerated/per SLP recommendations.  2) Add Nepro TID (honey thick) to optimize po intake and provide an additional 425 kcal, 19.1g protein per serving.  3) Rx: Nephro-Thomas and vitamin C 500mg daily.  4) Encourage po intake, monitor diet tolerance, and provide assistance at meals as needed.  5) Obtain daily weights to monitor trends.     Monitoring and Evaluation:   [x ] PO intake [x ] Tolerance to diet prescription [X] Weights  [X] Follow up per protocol [X] Labs:
Source: Patient [ ]  Family [ ]   other [x ] RN    Current Diet: Diet, DASH/TLC:   Sodium & Cholesterol Restricted  Dysphagia 1, Pureed, Honey Consistency Fluid (DYS1HC)  Supplement Feeding Modality:  Oral  Ensure Pudding Cans or Servings Per Day:  3       Frequency:  Three Times a day (03-28-21 @ 11:18)    PO intake:  Pt with mostly decreased po intake at meals per RN.  Pt consumed minimal amount at breakfast per tray observation at bedside.  Aware pt prefers [place] Ensure with meals.    Current Weight:   (4/1) 173.3 lbs  (3/26) 167.1 lbs  (3/25) 165 lbs  (3/24) 169.5 lbs  (3/23) 165.7 lbs  (3/22) 167.6 lbs  (3/21) 167.5 lbs  (3/20) 168 lbs  (3/19) 167.9 lbs  (3/18) 172.1 lbs  (3/17) 171.9 lbs  (3/16) 171 lbs  (3/15) 180.3 lbs  (3/14) 160.7 lbs    % Weight Change - wt fluctuations noted, will continue to  monitor.  Aware pt with 3+ moderate edema right arm and 4+ severe edema B/L legs noted.    Pertinent Medications: MEDICATIONS  (STANDING):  acetaminophen   Tablet .. 650 milliGRAM(s) Oral every 6 hours  apixaban 2.5 milliGRAM(s) Oral <User Schedule>  clopidogrel Tablet 75 milliGRAM(s) Oral daily  dextrose 40% Gel 15 Gram(s) Oral once  dextrose 5%. 1000 milliLiter(s) (50 mL/Hr) IV Continuous <Continuous>  dextrose 5%. 1000 milliLiter(s) (100 mL/Hr) IV Continuous <Continuous>  dextrose 50% Injectable 25 Gram(s) IV Push once  dextrose 50% Injectable 12.5 Gram(s) IV Push once  dextrose 50% Injectable 25 Gram(s) IV Push once  DULoxetine 60 milliGRAM(s) Oral daily  epoetin juan-epbx (RETACRIT) Injectable 10640 Unit(s) IV Push <User Schedule>  glucagon  Injectable 1 milliGRAM(s) IntraMuscular once  hydrALAZINE 50 milliGRAM(s) Oral two times a day  insulin lispro (ADMELOG) corrective regimen sliding scale   SubCutaneous three times a day before meals  NIFEdipine XL 60 milliGRAM(s) Oral at bedtime  NIFEdipine XL 90 milliGRAM(s) Oral daily  pantoprazole  Injectable 40 milliGRAM(s) IV Push every 12 hours  polyethylene glycol 3350 17 Gram(s) Oral daily  senna 2 Tablet(s) Oral at bedtime  tamsulosin 0.4 milliGRAM(s) Oral at bedtime  torsemide 20 milliGRAM(s) Oral daily    MEDICATIONS  (PRN):  artificial tears (preservative free) Ophthalmic Solution 1 Drop(s) Both EYES two times a day PRN Dry Eyes  traMADol 25 milliGRAM(s) Oral every 4 hours PRN Severe Pain (7 - 10)    Pertinent Labs: CBC Full  -  ( 01 Apr 2021 02:41 )  WBC Count : 13.54 K/uL  RBC Count : 2.75 M/uL  Hemoglobin : 8.3 g/dL  Hematocrit : 24.8 %  Platelet Count - Automated : 313 K/uL  Mean Cell Volume : 90.2 fl  Mean Cell Hemoglobin : 30.2 pg  Mean Cell Hemoglobin Concentration : 33.5 gm/dL  04-01 Na136 mmol/L Glu 74 mg/dL K+ 4.6 mmol/L Cr  5.37 mg/dL<H> BUN 62.0 mg/dL<H> Phos n/a   Alb n/a   PAB n/a       Skin: DTI right buttock    Nutrition focused physical exam previously conducted - found signs of malnutrition [ ]absent [x ]present    Subcutaneous fat loss: SEVERE [x ] Orbital fat pads region, [x ]Buccal fat region, [ ]Triceps region,  [ ]Ribs region    Muscle wasting: SEVERE [x ]Temples region, [ x]Clavicle region, [x ]Shoulder region, [ ]Scapula region, MODERATE [x ]Interosseous region,  [x ]thigh region, [ ]Calf region    Current Nutrition Diagnosis: Pt remains at nutrition risk secondary to severe protein calorie malnutrition (acute on chronic) related to inability to consume sufficient protein energy intake with multiple hospitalizations, ESRD on HD, HF,  GI bleed clipped gastric AVMs, thrombosed L CFA to below knee pop bypass s/p LLE bypass revision w explant of PTFE bypass, CFA to AT bypass w cryovein complicated by acute blood loss anemia and loss of PPM function intra-op as evidenced by meeting <75% estimated energy intake > 1 month, moderate/severe muscle wasting, severe fat loss and moderate/severe edema.  Pt required intubation for procedure, now extubated.  Pt with decreased po intake at meals per RN; continues to receive puree/honey diet per SLP recommendation (3/31).  Pt consumed minimal amount at breakfast this morning per tray observation.  Aware pt prefers [place] Ensure with meals.     Recommendations:   1) Continue diet consistency as tolerated/per SLP recommendations.  2) Add Strawberry Ensure TID (honey) per pt preference  3) Continue with Ensure pudding TID  4) Rx: Nephro-Thomas and vitamin C 500mg daily.  5) Obtain daily weights to monitor trends.     Monitoring and Evaluation:   [x ] PO intake [x ] Tolerance to diet prescription [X] Weights  [X] Follow up per protocol [X] Labs:
Source: Patient [x ]  Family [ ]   other [ ]    Current Diet: Diet, Mechanical Soft:   Consistent Carbohydrate {Evening Snack} (CSTCHOSN)  DASH/TLC {Sodium & Cholesterol Restricted} (DASH)  1500mL Fluid Restriction ([id number])  For patients receiving Renal Replacement - No Protein Restr, No Conc K, No Conc Phos, Low  Sodium (RENAL)  Supplement Feeding Modality:  Oral  Nepro Cans or Servings Per Day:  3       Frequency:  Daily (04-04-21 @ 10:18)    PO intake: Pt reports good po intake at meals, however decreased po intake noted per RN flowsheets.  Pt consumed ~25% at breakfast per tray observation at bedside- Nepro supplement not consumed.    Current Weight:   (4/8) 168.8 lbs  (4/1) 173.3 lbs  (3/26) 167.1 lbs  (3/25) 165 lbs  (3/24) 169.5 lbs  (3/23) 165.7 lbs  (3/22) 167.6 lbs  (3/21) 167.5 lbs  (3/20) 168 lbs  (3/19) 167.9 lbs  (3/18) 172.1 lbs  (3/17) 171.9 lbs  (3/16) 171 lbs  (3/15) 180.3 lbs  (3/14) 160.7 lbs    % Weight Change - wt fluctuations noted, will continue to  monitor.  Aware pt with 2+ mild edema B/L legs noted.    Pertinent Medications: MEDICATIONS  (STANDING):  acetaminophen   Tablet .. 650 milliGRAM(s) Oral every 6 hours  ceFAZolin   IVPB 1000 milliGRAM(s) IV Intermittent every 24 hours  dextrose 40% Gel 15 Gram(s) Oral once  dextrose 5%. 1000 milliLiter(s) (50 mL/Hr) IV Continuous <Continuous>  dextrose 5%. 1000 milliLiter(s) (100 mL/Hr) IV Continuous <Continuous>  dextrose 50% Injectable 25 Gram(s) IV Push once  dextrose 50% Injectable 12.5 Gram(s) IV Push once  dextrose 50% Injectable 25 Gram(s) IV Push once  diltiazem    milliGRAM(s) Oral daily  DULoxetine 60 milliGRAM(s) Oral daily  epoetin juan-epbx (RETACRIT) Injectable 41500 Unit(s) IV Push <User Schedule>  glucagon  Injectable 1 milliGRAM(s) IntraMuscular once  heparin   Injectable 5000 Unit(s) SubCutaneous every 8 hours  hydrALAZINE 25 milliGRAM(s) Oral two times a day  insulin lispro (ADMELOG) corrective regimen sliding scale   SubCutaneous three times a day before meals  pantoprazole  Injectable 40 milliGRAM(s) IV Push every 12 hours  polyethylene glycol 3350 17 Gram(s) Oral daily  senna 2 Tablet(s) Oral at bedtime  tamsulosin 0.4 milliGRAM(s) Oral at bedtime  torsemide 20 milliGRAM(s) Oral <User Schedule>    MEDICATIONS  (PRN):  artificial tears (preservative free) Ophthalmic Solution 1 Drop(s) Both EYES two times a day PRN Dry Eyes  traMADol 25 milliGRAM(s) Oral every 6 hours PRN Moderate Pain (4 - 6)    Pertinent Labs: CBC Full  -  ( 08 Apr 2021 06:13 )  WBC Count : 8.24 K/uL  RBC Count : 3.31 M/uL  Hemoglobin : 9.7 g/dL  Hematocrit : 30.6 %  Platelet Count - Automated : 565 K/uL  Mean Cell Volume : 92.4 fl  Mean Cell Hemoglobin : 29.3 pg  Mean Cell Hemoglobin Concentration : 31.7 gm/dL    Skin: DTI right buttock    Nutrition focused physical exam previously conducted - found signs of malnutrition [ ]absent [x ]present    Subcutaneous fat loss: SEVERE [x ] Orbital fat pads region, [x ]Buccal fat region, [ ]Triceps region,  [ ]Ribs region    Muscle wasting: SEVERE [x ]Temples region, [ x]Clavicle region, [x ]Shoulder region, [ ]Scapula region, MODERATE [x ]Interosseous region,  [x ]thigh region, [ ]Calf region    Current Nutrition Diagnosis: Pt remains at nutrition risk secondary to severe protein calorie malnutrition (acute on chronic) related to inability to consume sufficient protein energy intake with multiple hospitalizations, ESRD on HD, HF,  GI bleed clipped gastric AVMs, thrombosed L CFA to below knee pop bypass s/p LLE bypass revision w explant of PTFE bypass, CFA to AT bypass w cryovein complicated by acute blood loss anemia and loss of PPM function intra-op as evidenced by meeting <75% estimated energy intake > 1 month, moderate/severe muscle wasting, and severe fat loss.  [facility] ([facility]  Pt with decreased po intake at meals per RN flowsheets, however pt reports good appetite/po intake.  Pt consumed ~25% at breakfast this morning per tray observation.       Recommendations:   1) Continue diet consistency as tolerated/per SLP recommendations.  2) Continue with Nepro TID  3) Rx: Nephro-Thomas and vitamin C 500mg daily.  4) Obtain daily weights to monitor trends.     Monitoring and Evaluation:   [x ] PO intake [x ] Tolerance to diet prescription [X] Weights  [X] Follow up per protocol [X] Labs:
Source: Patient [x ]  Family [ ]   other [ ] aware confusion at times    Current Diet: Diet, Renal Restrictions:   For patients receiving Renal Replacement - No Protein Restr, No Conc K, No Conc Phos, Low Sodium  DASH/TLC {Sodium & Cholesterol Restricted} (DASH)  Supplement Feeding Modality:  Oral  Nepro Cans or Servings Per Day:  1       Frequency:  Three Times a day (03-16-21 @ 08:19)    PO intake:  Pt reports fair po intake at meals- states he prefers fluids at meals, but not consuming Nepro supplements.    Current Weight:   (3/22) 167.6 lbs  (3/17) 172.1 lbs  (3/13) 155 lbs    % Weight Change - question accuracy of wts; pt unsure of UBW- will continue to monitor.  Aware pt with 2+ mild edema left hip/left leg noted.    Pertinent Medications: MEDICATIONS  (STANDING):  acetaminophen   Tablet .. 975 milliGRAM(s) Oral every 6 hours  atorvastatin 80 milliGRAM(s) Oral at bedtime  chlorhexidine 4% Liquid 1 Application(s) Topical <User Schedule>  clopidogrel Tablet 75 milliGRAM(s) Oral daily  desmopressin 0.1 milliGRAM(s) Oral once  DULoxetine 60 milliGRAM(s) Oral daily  epoetin juan-epbx (RETACRIT) Injectable 14380 Unit(s) IV Push <User Schedule>  heparin   Injectable 5000 Unit(s) SubCutaneous every 8 hours  hydrALAZINE 50 milliGRAM(s) Oral two times a day  isosorbide   mononitrate ER Tablet (IMDUR) 30 milliGRAM(s) Oral daily  Nephro-pito 1 Tablet(s) Oral daily  NIFEdipine XL 90 milliGRAM(s) Oral daily  NIFEdipine XL 60 milliGRAM(s) Oral <User Schedule>  pantoprazole    Tablet 40 milliGRAM(s) Oral every 12 hours  torsemide 20 milliGRAM(s) Oral <User Schedule>    MEDICATIONS  (PRN):    Pertinent Labs: CBC Full  -  ( 22 Mar 2021 06:11 )  WBC Count : 13.98 K/uL  RBC Count : 2.76 M/uL  Hemoglobin : 7.9 g/dL  Hematocrit : 26.4 %  Platelet Count - Automated : 226 K/uL  Mean Cell Volume : 95.7 fl  Mean Cell Hemoglobin : 28.6 pg  Mean Cell Hemoglobin Concentration : 29.9 gm/dL  Auto Neutrophil # : 12.39 K/uL  Auto Lymphocyte # : 0.33 K/uL  Auto Monocyte # : 1.01 K/uL  Auto Eosinophil # : 0.14 K/uL  Auto Basophil # : 0.04 K/uL  Auto Neutrophil % : 88.6 %  Auto Lymphocyte % : 2.4 %  Auto Monocyte % : 7.2 %  Auto Eosinophil % : 1.0 %  Auto Basophil % : 0.3 %  03-22 Na134 mmol/L<L> Glu 183 mg/dL<H> K+ 5.0 mmol/L Cr  6.60 mg/dL<H> BUN 56.0 mg/dL<H> Phos 7.8 mg/dL<H> Alb n/a   PAB n/a       Skin: sx right groin    Nutrition focused physical exam conducted - found signs of malnutrition [ ]absent [x ]present    Subcutaneous fat loss: SEVERE [x ] Orbital fat pads region, [ x]Buccal fat region, [ ]Triceps region,  [ ]Ribs region    Muscle wasting: SEVERE [x ]Temples region, [x ]Clavicle region, [x ]Shoulder region, [ ]Scapula region,  MODERATE [ x]Interosseous region,  [ x]thigh region, [ ]Calf region    Current Nutrition Diagnosis: Pt remains at nutrition risk secondary to severe protein calorie malnutrition (acute on chronic) related to inability to consume sufficient protein energy intake with multiple hospitalizations; worsening left leg pain with weakness and RICHARDS upon admission as evidenced by pt meeting <75% estimated energy intake > 1 month, moderate/severe muscle wasting and severe fat loss.  Unable to speak with pt upon initial interview as he was asleep.  Spoke with pt now who states fair po intake at this time.  Pt states usual good appetite prior to admission and unsure of wt changes.  Pt not consuming Nepro supplements- encourage intake when po intake is suboptimal. Food preferences obtained.    Recommendations:   1. Continue with diet rx and Nepro BID  2. Continue with Nephro-Pito daily  3. Monitor daily wts     Monitoring and Evaluation:   [x ] PO intake [x ] Tolerance to diet prescription [X] Weights  [X] Follow up per protocol [X] Labs:
Tele reviewed, no signs of PM malfunction or LOC   Will perform repeat PM check sometime during the week  Capture management turned OFF yesterday
Telemetry review performed personally. No telemetry evidence of loss of capture in last 24 hours. Will recheck device function early next week.
The patient has hx of GI bleeding, noted slight drop in h/h today although no bloody BM's reported since Saturday  Eliquis and plavix are currently on hold due to bleeding risk, he has been stable on subq heparin.   Repeat duplex 4/3 shows no propagation of thrombus.   Discussed with Dr. Albrecht possible intervention for IVC filter. Per attending, there is high risk for recurrent DVT's after IVC placement, risk of bleeding, and at this time the risk of the procedure outweighs the benefits. We will continue to perform surveillance for propagation of clot, and continue to monitor h/h while on subq heparin.
The patient's current clinical status was discussed with patient's family and all questions were answered.
The vascular surgery resident was called by daughter at 1225 that she notices a change in color of the left foot. THe patient was evaluated in HD at 1230 . At this time there was unchanged discoloration to the mid foot to toes from when the patient was seen at 0630 this AM, and previous hospital days.   Cap refill less than 3 seconds. L foot was warm, compartments soft, and there were dopplerable DP pulses. There was no neuro or sensory deficits.     I was Called by nurse that the patient was having discoloration in the foot at 1557. The  Vascular team responded to the call immediately and evaluated for the third time today.   Patient states he feels great besides the pain in his left foot, which has been persistent for 5 months. He is sitting comfortably in the chair.   The physical exam is unchanged. He has red-purple discoloration to the mid foot - toes. + dopplerable DP pulses. no neuro / senosry defitits. cap refill less than 3 seconds. foot is warm, WWP.     THe patient is planned for fem-pop bypass tomorrow.   Spoke with daughter and patient that physical exam is unchanged, and the foot is WWP, with dopplerable signals.   Dr. Albrecht made aware.
Called the patient's family to discuss current clinical status and all questions were answered.
I assess patient at bedside able to obtain Left AT pulse with doppler faint, monophasic, foot is cool to touch, toes are mildly dusky. Per patient sensation intact, can move extremity. Medial below knee incision with intact steri-stripes and dry blood, hemostasis achieved.
Spoke with patient and daughter bedside. Risks and benefits of blood transfusion discussed. Consent obtained from Daughter Vanessa Douglas because patient mildly confused at present. Both agree for transfusion.

## 2021-04-08 NOTE — PROGRESS NOTE ADULT - SUBJECTIVE AND OBJECTIVE BOX
Patient is a 86y old  Male who presents with a chief complaint of symptomatic anemia (08 Apr 2021 10:13)      HPI:  85 y/o male with PMHx of HTN, HLD, CAD, HFpEF, s/p Right CEA for Carotid artery disease, ESRD on HD 2d/week (M/Fri) via LUE fistula, s/p flecainide w/ ILR placed 6/2020, AS s/p TAVR, PAD with RLE revasc on 10/2020 on Plavix, anemia requiring prior transfusions, following at Dr Varghese office for aricept, .     NO rectal bleeding or melena on Sq heparin. Hgb stable. Tolerating a solid diet.       REVIEW OF SYSTEMS:  Constitutional: No fever, weight loss or fatigue  ENMT:  No difficulty hearing, tinnitus, vertigo; No sinus or throat pain  Respiratory: No cough, wheezing, chills or hemoptysis  Cardiovascular: No chest pain, palpitations, dizziness or leg swelling  Gastrointestinal: No abdominal or epigastric pain. No nausea, vomiting or hematemesis; No diarrhea or constipation. No melena or hematochezia.  Skin: No itching, burning, rashes or lesions   Musculoskeletal: No joint pain or swelling; No muscle, back or extremity pain    PAST MEDICAL & SURGICAL HISTORY:  DM (diabetes mellitus)  - resolved    AV block, 1st degree    Arrhythmia    CAD (coronary artery disease)    HTN (hypertension)    VT (ventricular tachycardia)    RICHARDS (dyspnea on exertion)    Anemia    Risk factors for obstructive sleep apnea    ESRD on dialysis  HD on Mondays and Fridays    Aortic stenosis  - s/p valve replacement    GI bleeding  due to ulcerated polyps and colonic AVMs    H/O circumcision  at  age  65    H/O left knee surgery    H/O angioplasty  2013,  no  intervention    A-V fistula  left arm 5/2017    H/O carotid endarterectomy  Right    S/P TAVR (transcatheter aortic valve replacement)    History of loop recorder        FAMILY HISTORY:  Family history of cancer (Grandparent)    Family history of lung cancer (Grandparent)    Family history of premature CAD    FH: type 2 diabetes        SOCIAL HISTORY:  Smoking Status: [ ] Current, [ ] Former, [ ] Never  Pack Years:  [  ] EtOH-no  [  ] IVDA    MEDICATIONS:  MEDICATIONS  (STANDING):  acetaminophen   Tablet .. 650 milliGRAM(s) Oral every 6 hours  ceFAZolin   IVPB 1000 milliGRAM(s) IV Intermittent every 24 hours  dextrose 40% Gel 15 Gram(s) Oral once  dextrose 5%. 1000 milliLiter(s) (50 mL/Hr) IV Continuous <Continuous>  dextrose 5%. 1000 milliLiter(s) (100 mL/Hr) IV Continuous <Continuous>  dextrose 50% Injectable 25 Gram(s) IV Push once  dextrose 50% Injectable 12.5 Gram(s) IV Push once  dextrose 50% Injectable 25 Gram(s) IV Push once  diltiazem    milliGRAM(s) Oral daily  DULoxetine 60 milliGRAM(s) Oral daily  epoetin juan-epbx (RETACRIT) Injectable 43837 Unit(s) IV Push <User Schedule>  glucagon  Injectable 1 milliGRAM(s) IntraMuscular once  heparin   Injectable 5000 Unit(s) SubCutaneous every 8 hours  hydrALAZINE 25 milliGRAM(s) Oral two times a day  insulin lispro (ADMELOG) corrective regimen sliding scale   SubCutaneous three times a day before meals  pantoprazole    Tablet 40 milliGRAM(s) Oral every 12 hours  polyethylene glycol 3350 17 Gram(s) Oral daily  senna 2 Tablet(s) Oral at bedtime  tamsulosin 0.4 milliGRAM(s) Oral at bedtime  torsemide 20 milliGRAM(s) Oral <User Schedule>    MEDICATIONS  (PRN):  artificial tears (preservative free) Ophthalmic Solution 1 Drop(s) Both EYES two times a day PRN Dry Eyes      Allergies    Plavix (Hives)  Toprol-XL (Rash)    Intolerances        Vital Signs Last 24 Hrs  T(C): 37.1 (08 Apr 2021 09:36), Max: 37.1 (07 Apr 2021 12:54)  T(F): 98.7 (08 Apr 2021 09:36), Max: 98.7 (07 Apr 2021 12:54)  HR: 70 (08 Apr 2021 09:36) (61 - 71)  BP: 171/53 (08 Apr 2021 09:36) (159/65 - 179/75)  BP(mean): --  RR: 16 (08 Apr 2021 09:36) (16 - 18)  SpO2: 96% (08 Apr 2021 09:36) (92% - 100%)    04-07 @ 07:01  -  04-08 @ 07:00  --------------------------------------------------------  IN: 0 mL / OUT: 2900 mL / NET: -2900 mL    04-08 @ 07:01  -  04-08 @ 11:54  --------------------------------------------------------  IN: 240 mL / OUT: 100 mL / NET: 140 mL          PHYSICAL EXAM:    General: elderly and frail ; in no acute distress  HEENT: MMM, conjunctiva and sclera clear  H- RRR  L- CTA   Gastrointestinal: Soft, non-tender non-distended; Normal bowel sounds; No rebound or guarding  Neurological: Alert and oriented x3  Skin: Warm and dry. No obvious rash      LABS:                        9.7    8.24  )-----------( 565      ( 08 Apr 2021 06:13 )             30.6                     RADIOLOGY & ADDITIONAL STUDIES:     < from: Xray Chest 1 View- PORTABLE-Urgent (Xray Chest 1 View- PORTABLE-Urgent .) (04.07.21 @ 19:49) >  MPRESSION:  Congestive changes as noted.    Thank you for the courtesy of this referral.    < end of copied text >

## 2021-04-08 NOTE — H&P ADULT - NSICDXPASTSURGICALHX_GEN_ALL_CORE_FT
PAST SURGICAL HISTORY:  A-V fistula left arm 5/2017    H/O angioplasty 2013,  no  intervention    H/O carotid endarterectomy Right    H/O circumcision at  age  65    H/O left knee surgery     History of loop recorder     S/P TAVR (transcatheter aortic valve replacement)

## 2021-04-08 NOTE — H&P ADULT - ATTENDING COMMENTS
History from chart as patient unable to provide history  Patient seen at bedside  86 year old male with history as stated above admitted to rehab after recent complicated hospital stay since March 13, 2021 with GI bleed and course significant for LLE revascularization procedure over two attempts. Patient needed multiple transfusions in OR   .  Now admitted to rehab for debility due to complex medical course  On exam, patient alert, awake, at times moaning for unclear reason, follows simple commands.  Anasarca noted - Back, thighs, LE edema noted  Incisions - left groin, left lateral thigh with staples, Calf incisions with dressing +, Left foot - 4th and 5th toes with gangrenous areas. and decubitus as stated above. DP pulse felt. Foot warm to touch.     Begin full rehab program  Medicine and nephrology consult History from chart as patient unable to provide history  Patient seen at bedside  86 year old male with history as stated above admitted to rehab after recent complicated hospital stay since March 13, 2021 with GI bleed and course significant for LLE revascularization procedure over two attempts. Patient needed multiple transfusions in OR   .  Now admitted to rehab for debility due to complex medical course and multiple medical comorbidities   On exam, patient alert, awake, at times moaning for unclear reason, follows simple commands.  Anasarca noted - Back, thighs, LE edema noted  Incisions - left groin, left lateral thigh with staples, Calf incisions with dressing +, Left foot - 4th and 5th toes with gangrenous areas. and decubitus as stated above. DP pulse felt. Foot warm to touch.     Begin full rehab program  Medicine and nephrology consult

## 2021-04-08 NOTE — STUDENT SIGN OFF DOCUMENT - COPY OF STUDENT DOCUMENT REVIEW
[facility] nursing
[facility]

## 2021-04-08 NOTE — PROGRESS NOTE ADULT - SUBJECTIVE AND OBJECTIVE BOX
[facility]                              [address] [place] [address]                                                 PHONE: [phone]                                                 FAX: [phone]  -----------------------------------------------------------------------------------------------  Pt seen and examined. FU for  shortness of breath      Overnight events/Complaints: Pt sitting up in chair. Feels well.     Vital Signs Last 24 Hrs  T(C): 37.1 (08 Apr 2021 09:36), Max: 37.1 (07 Apr 2021 12:54)  T(F): 98.7 (08 Apr 2021 09:36), Max: 98.7 (07 Apr 2021 12:54)  HR: 70 (08 Apr 2021 09:36) (61 - 71)  BP: 171/53 (08 Apr 2021 09:36) (159/65 - 179/75)  BP(mean): --  RR: 16 (08 Apr 2021 09:36) (16 - 18)  SpO2: 96% (08 Apr 2021 09:36) (92% - 100%)  I&O's Summary    07 Apr 2021 07:01  -  08 Apr 2021 07:00  --------------------------------------------------------  IN: 0 mL / OUT: 2900 mL / NET: -2900 mL    08 Apr 2021 07:01  -  08 Apr 2021 10:14  --------------------------------------------------------  IN: 240 mL / OUT: 100 mL / NET: 140 mL    PHYSICAL EXAM:  Constitutional: Elderly male in bed  HEENT:     Head: Normocephalic and atraumatic  Neck: supple. No JVD  Cardiovascular: regular S1 S2  Respiratory: Lungs clear to auscultation; no crepitations, no wheeze  Gastrointestinal:  Soft, Non-tender, + BS	  Musculoskeletal: Normal range of motion. No edema  Skin: Warm and dry. No cyanosis . No diaphoresis.  Neurologic: Alert oriented to time place and person. Normal strength and no tremor.        LABS:                        9.7    8.24  )-----------( 565      ( 08 Apr 2021 06:13 )             30.6     RADIOLOGY & ADDITIONAL STUDIES: (reviewed)  CXR was independently visualized/reviewed  and demonstrated: improving infiltrates    CARDIOLOGY TESTING:(reviewed)     12 lead EKG independently visualized/reviewed  and demonstrated paced rhythm    ECHOCARDIOGRAM independently visualized/reviewed and demonstrated :   Summary:   1. Moderately enlarged left atrium.   2. There is mild concentric left ventricular hypertrophy.   3. Left ventricular ejection fraction, by visual estimation, is 60 to 65%.   4. Grade II diastolic dysfunction. Elevated mean left atrial pressure.   5. Mildly enlarged right atrium.   6. Mildly enlarged right ventricle. Mildly reduced RV systolic function.   7. Mild mitral stenosis. Moderate mitral valve regurgitation. Elevated mean gradient likely due to volume overload. Repeat study after diuresis to reevaluate mitral valve. If clinically indicated.   8. 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 Well seated valve. Acceptable gradients. No regurgitation.   9. Mild tricuspid regurgitation.  10. Estimated pulmonary artery systolic pressure is 78 mmHg - severe pulmonary hypertension.  11. There is no evidence of pericardial effusion.    Catheterization:  RIGHT LOWER EXTREMITY VESSELS: Right superficial femoral: There was a 90 %  stenosis. Right peroneal: There was a 100 % stenosis.  COMPLICATIONS: No complications occurred during the cath lab visit.  Prepared and signed by  Siva Albrecht MD    TELEMETRY independently visualized/reviewed and demonstrated : PAF with paced rhythm    MEDICATIONS:(reviewed)  MEDICATIONS  (STANDING):  acetaminophen   Tablet .. 650 milliGRAM(s) Oral every 6 hours  ceFAZolin   IVPB 1000 milliGRAM(s) IV Intermittent every 24 hours  dextrose 40% Gel 15 Gram(s) Oral once  dextrose 5%. 1000 milliLiter(s) (50 mL/Hr) IV Continuous <Continuous>  dextrose 5%. 1000 milliLiter(s) (100 mL/Hr) IV Continuous <Continuous>  dextrose 50% Injectable 25 Gram(s) IV Push once  dextrose 50% Injectable 12.5 Gram(s) IV Push once  dextrose 50% Injectable 25 Gram(s) IV Push once  diltiazem    milliGRAM(s) Oral daily  DULoxetine 60 milliGRAM(s) Oral daily  epoetin juan-epbx (RETACRIT) Injectable 30774 Unit(s) IV Push <User Schedule>  glucagon  Injectable 1 milliGRAM(s) IntraMuscular once  heparin   Injectable 5000 Unit(s) SubCutaneous every 8 hours  hydrALAZINE 25 milliGRAM(s) Oral two times a day  insulin lispro (ADMELOG) corrective regimen sliding scale   SubCutaneous three times a day before meals  pantoprazole    Tablet 40 milliGRAM(s) Oral every 12 hours  polyethylene glycol 3350 17 Gram(s) Oral daily  senna 2 Tablet(s) Oral at bedtime  tamsulosin 0.4 milliGRAM(s) Oral at bedtime  torsemide 20 milliGRAM(s) Oral <User Schedule>      ASSESSMENT AND PLAN:    86y Male h/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  Episode of ? PAF/sinus tachy post HD.   Rectal bleeding- probably diverticular bleeding    HR stable on Cardizem  Off Eliquis- Pt on Eliquis for DVT.   Known PAF in the past and pt was deemed high risk for anticoagulation for AF.   Plavix restarted on discharge given recent LLE bypass   H/H stable  BP controlled  Continue torsemide   Tolerated HD yesterday    Pls recall if any qns/concerns. Will arrange outpt FU post discharge.

## 2021-04-08 NOTE — PROGRESS NOTE ADULT - SUBJECTIVE AND OBJECTIVE BOX
INTERVAL HPI/OVERNIGHT EVENTS/SUBJECTIVE:  Pt seen and examined. In good spirits, offers no complaints. pain well controlled. OOB with PT. awaiting LYNNE placement.     ICU Vital Signs Last 24 Hrs  T(C): 36.5 (08 Apr 2021 05:25), Max: 37.1 (07 Apr 2021 12:54)  T(F): 97.7 (08 Apr 2021 05:25), Max: 98.7 (07 Apr 2021 12:54)  HR: 67 (08 Apr 2021 05:25) (58 - 71)  BP: 179/75 (08 Apr 2021 05:25) (159/65 - 179/75)  BP(mean): --  ABP: --  ABP(mean): --  RR: 18 (08 Apr 2021 05:25) (16 - 18)  SpO2: 92% (08 Apr 2021 05:25) (92% - 100%)      I&O's Detail    07 Apr 2021 07:01  -  08 Apr 2021 07:00  --------------------------------------------------------  IN:  Total IN: 0 mL    OUT:    Other (mL): 2700 mL    Voided (mL): 200 mL  Total OUT: 2900 mL    Total NET: -2900 mL      ABG - ( 07 Apr 2021 19:53 )  pH, Arterial: 7.52  pH, Blood: x     /  pCO2: 38    /  pO2: 98    / HCO3: 32    / Base Excess: 8.0   /  SaO2: 99        MEDICATIONS  (STANDING):  acetaminophen   Tablet .. 650 milliGRAM(s) Oral every 6 hours  ceFAZolin   IVPB 1000 milliGRAM(s) IV Intermittent every 24 hours  dextrose 40% Gel 15 Gram(s) Oral once  dextrose 5%. 1000 milliLiter(s) (50 mL/Hr) IV Continuous <Continuous>  dextrose 5%. 1000 milliLiter(s) (100 mL/Hr) IV Continuous <Continuous>  dextrose 50% Injectable 25 Gram(s) IV Push once  dextrose 50% Injectable 12.5 Gram(s) IV Push once  dextrose 50% Injectable 25 Gram(s) IV Push once  diltiazem    milliGRAM(s) Oral daily  DULoxetine 60 milliGRAM(s) Oral daily  epoetin juan-epbx (RETACRIT) Injectable 92440 Unit(s) IV Push <User Schedule>  glucagon  Injectable 1 milliGRAM(s) IntraMuscular once  heparin   Injectable 5000 Unit(s) SubCutaneous every 8 hours  hydrALAZINE 25 milliGRAM(s) Oral two times a day  insulin lispro (ADMELOG) corrective regimen sliding scale   SubCutaneous three times a day before meals  pantoprazole  Injectable 40 milliGRAM(s) IV Push every 12 hours  polyethylene glycol 3350 17 Gram(s) Oral daily  senna 2 Tablet(s) Oral at bedtime  tamsulosin 0.4 milliGRAM(s) Oral at bedtime  torsemide 20 milliGRAM(s) Oral <User Schedule>    MEDICATIONS  (PRN):  artificial tears (preservative free) Ophthalmic Solution 1 Drop(s) Both EYES two times a day PRN Dry Eyes  traMADol 25 milliGRAM(s) Oral every 6 hours PRN Moderate Pain (4 - 6)      MISC:     PHYSICAL EXAM:  Alert, oriented to self and place  non labored breathing  abdomen is soft, non tender  left groin incision CDI, Left lateral thigh incision with improved surrounding erythema. Bypass graft with palpable pulse to the lateral thigh.  the other incisions are CDI (Medial calf and distal)  left 30dergree knee flexion contracture   Left foot is warm, + biphasic AT signal   leg edema improving    LABS:  CBC Full  -  ( 08 Apr 2021 06:13 )  WBC Count : 8.24 K/uL  RBC Count : 3.31 M/uL  Hemoglobin : 9.7 g/dL  Hematocrit : 30.6 %  Platelet Count - Automated : 565 K/uL  Mean Cell Volume : 92.4 fl  Mean Cell Hemoglobin : 29.3 pg  Mean Cell Hemoglobin Concentration : 31.7 gm/dL  Auto Neutrophil # : x  Auto Lymphocyte # : x  Auto Monocyte # : x  Auto Eosinophil # : x  Auto Basophil # : x  Auto Neutrophil % : x  Auto Lymphocyte % : x  Auto Monocyte % : x  Auto Eosinophil % : x  Auto Basophil % : x    ASSESSMENT/PLAN:  86yMale  with: reperfused left distal LE  resolving GI bleed  left CFV thrombus  left knee contracture due it inactivity  localized erythema to the left lateral thigh - resolved with abx  •   Plan:   - ABX till 5 day, then D/C ( IV ancef may be transition to PO Keflex)   • cont sub-q heparin for now for DVT  - may restart low grade antiplatelets if attending agrees  - left knee with progressive flexion contraction due to extensive post surgical inactivity.  Patient requires aggressive Physical Therapy, needs to start an active regimen  - will start the process for discharge to a rehab facility; patient is currently stable from a vascular surgical standpoint

## 2021-04-08 NOTE — H&P ADULT - ASSESSMENT
Pt is a 85 y/o M with PMHx of HTN, HLD, CAD, HFpEF, s/p Right CEA for Carotid artery disease, ESRD on HD 2d/week (M/Fri) via LUE fistula, s/p flecainide w/ ILR placed 6/2020, AS s/p TAVR, and PAD who presented to [facility] on 3/13/21 with GI bleed, as well as LLE pain, found to have occlusion of left superficial femoral artery. Pt underwent EGD and AVM cauterizations. He also underwent left fem-pop bypass x2 and was found to have LLE DVT, treated with heparin. He has functional deficits of with his ambulation and his endurance.      #PAD  - s/p fem-pop bypass and revision  - Keflex 500mg bid for 2 days to complete 5 day course for skin erythema around his incision  - Pain control  - cleared to resume Plavix 75mg  - PT/OT/ST    #DVT  - left femoral vein DVT  - on Heparin tid    #UGIB  - egd showed multiple AVMs, cauterized during EGD  - Protonix bid    #CKD  - on M/F dialysis with extra dialysis intermittently  - Renal consult  - Epoetin Yanick with dialysis    #CHF  - continue Toresemide 20mg 6 days a week  - continue Cardizem 240mg daily    #HTN  - continue Hydralazine 25mg bid    #DM  - ISS  - fignersticks    #Pain  - Cymbalta 60mg daily  - Tylenol PRN    #BPH  - continue Flomax     #GI  - Miralax, senna    #  - bladder scan on admission and straight cath as necessary    #Diet  - Mechanical Soft  - Consistent Carb, DASH/TLC, 1500mL fluid restriction, Renal diet      MEDICAL PROGNOSIS: GOOD                                   REHAB POTENTIAL: GOOD  ESTIMATED DISPOSITION: HOME                             ELOS: 10-14 Days   EXPECTED THERAPY:     P.T. 1 hr/day       O.T. 1 hr/day      S.L.P. 1 hr/day      EXP FREQUENCY: 5 days per 7 day period     PRESCREEN COMPARISON I have reviewed the prescreen information and I have found no relevant changes between the preadmission screening and my post admission evaluation     RATIONALE FOR INPATIENT ADMISSION - Patient demonstrates the following: (check all that apply)  [X] Medically appropriate for rehabilitation admission  [X] Has attainable rehab goals with an appropriate initial discharge plan  [X] Has rehabilitation potential (expected to make a significant improvement within a reasonable period of time)   [X] Requires close medical management by a rehab physician, rehab nursing care, Hospitalist and comprehensive interdisciplinary team (including PT, OT, & or SLP, Prosthetics and Orthotics)     Pt is a 87 y/o M with PMHx of HTN, HLD, CAD, HFpEF, s/p Right CEA for Carotid artery disease, ESRD on HD 2d/week (M/Fri) via LUE fistula, s/p flecainide w/ ILR placed 6/2020, AS s/p TAVR, and PAD who presented to [facility] on 3/13/21 with GI bleed, as well as LLE pain, found to have occlusion of left superficial femoral artery. Pt underwent EGD and AVM cauterizations. He also underwent left fem-pop bypass x2 and was found to have LLE DVT, treated with heparin. He has functional deficits of with his ambulation and his endurance.      #PAD  - s/p fem-pop bypass and revision  - Keflex 500mg bid for 2 days to complete 5 day course for skin erythema around his incision  - Pain control  - cleared to resume Plavix 75mg  - PT/OT/ST    #DVT  - left femoral vein DVT  - on Heparin tid  Per chart - patient not a candidate for IVC filter    #UGIB  - egd showed multiple AVMs, cauterized during EGD  - Protonix bid    #CKD  - on M/F dialysis with extra dialysis intermittently  - Renal consult  - Epoetin Yanick with dialysis    #CHF  - continue Toresemide 20mg 6 days a week  - continue Cardizem 240mg daily    #HTN  - continue Hydralazine 25mg bid    #DM  - ISS  - fignersticks    #Pain  - Cymbalta 60mg daily  - Tylenol PRN  Gabapentin 100mg BID     #BPH  - continue Flomax     #GI  - Miralax, senna    #  - bladder scan on admission and straight cath as necessary    #Diet  - Mechanical Soft  - Consistent Carb, DASH/TLC, 1500mL fluid restriction, Renal diet      MEDICAL PROGNOSIS: Fair                                 REHAB POTENTIAL: Poor to fair  ESTIMATED DISPOSITION: HOME                             ELOS: 14-18 Days   EXPECTED THERAPY:     P.T. 1 hr/day       O.T. 1 hr/day      S.L.P. 1 hr/day      EXP FREQUENCY: 5 days per 7 day period     PRESCREEN COMPARISON I have reviewed the prescreen information and I have found no relevant changes between the preadmission screening and my post admission evaluation     RATIONALE FOR INPATIENT ADMISSION - Patient demonstrates the following: (check all that apply)  [X] Medically appropriate for rehabilitation admission  [X] Has attainable rehab goals with an appropriate initial discharge plan  [X] Has rehabilitation potential (expected to make a significant improvement within a reasonable period of time)   [X] Requires close medical management by a rehab physician, rehab nursing care, Hospitalist and comprehensive interdisciplinary team (including PT, OT, & or SLP, Prosthetics and Orthotics)

## 2021-04-08 NOTE — PATIENT PROFILE ADULT - NS PRO AD NO ADVANCE DIRECTIVE
Still at the [facility], Dr. Arslan Mcintosh asked for the last 3 Creat levels and reason for seeing Neph. Info given with Neph dx of CKD stage 2.   Kevin Santiago, ANP
No

## 2021-04-08 NOTE — PROGRESS NOTE ADULT - SUBJECTIVE AND OBJECTIVE BOX
Pt seen and examined. FU for  shortness of breath      Overnight events/Complaints: Pt sitting up in chair. Feels well.     Vital Signs Last 24 Hrs  T(C): 37.1 (08 Apr 2021 09:36), Max: 37.1 (07 Apr 2021 12:54)  T(F): 98.7 (08 Apr 2021 09:36), Max: 98.7 (07 Apr 2021 12:54)  HR: 70 (08 Apr 2021 09:36) (61 - 71)  BP: 171/53 (08 Apr 2021 09:36) (159/65 - 179/75)  RR: 16 (08 Apr 2021 09:36) (16 - 18)  SpO2: 96% (08 Apr 2021 09:36) (92% - 100%)  I&O's Summary    07 Apr 2021 07:01  -  08 Apr 2021 07:00  --------------------------------------------------------  IN: 0 mL / OUT: 2900 mL / NET: -2900 mL    08 Apr 2021 07:01  -  08 Apr 2021 10:14  --------------------------------------------------------  IN: 240 mL / OUT: 100 mL / NET: 140 mL    PHYSICAL EXAM:  Constitutional: Elderly male in bed  HEENT:     Head: Normocephalic and atraumatic  Neck: supple. No JVD  Cardiovascular: regular S1 S2  Respiratory: Lungs clear to auscultation; no crepitations, no wheeze  Gastrointestinal:  Soft, Non-tender, + BS	  Musculoskeletal: Normal range of motion. No edema  Skin: Warm and dry. No cyanosis . No diaphoresis.  Neurologic: Alert oriented to time place and person. Normal strength and no tremor.        LABS:                        9.7    8.24  )-----------( 565      ( 08 Apr 2021 06:13 )             30.6     RADIOLOGY & ADDITIONAL STUDIES: (reviewed)  CXR was independently visualized/reviewed  and demonstrated: improving infiltrates    CARDIOLOGY TESTING:(reviewed)     12 lead EKG independently visualized/reviewed  and demonstrated paced rhythm    ECHOCARDIOGRAM independently visualized/reviewed and demonstrated :   Summary:   1. Moderately enlarged left atrium.   2. There is mild concentric left ventricular hypertrophy.   3. Left ventricular ejection fraction, by visual estimation, is 60 to 65%.   4. Grade II diastolic dysfunction. Elevated mean left atrial pressure.   5. Mildly enlarged right atrium.   6. Mildly enlarged right ventricle. Mildly reduced RV systolic function.   7. Mild mitral stenosis. Moderate mitral valve regurgitation. Elevated mean gradient likely due to volume overload. Repeat study after diuresis to reevaluate mitral valve. If clinically indicated.   8. 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 Well seated valve. Acceptable gradients. No regurgitation.   9. Mild tricuspid regurgitation.  10. Estimated pulmonary artery systolic pressure is 78 mmHg - severe pulmonary hypertension.  11. There is no evidence of pericardial effusion.    Catheterization:  RIGHT LOWER EXTREMITY VESSELS: Right superficial femoral: There was a 90 %  stenosis. Right peroneal: There was a 100 % stenosis.  COMPLICATIONS: No complications occurred during the cath lab visit.  Prepared and signed by  Siva Albrecht MD    TELEMETRY independently visualized/reviewed and demonstrated : PAF with paced rhythm    MEDICATIONS:(reviewed)  MEDICATIONS  (STANDING):  acetaminophen   Tablet .. 650 milliGRAM(s) Oral every 6 hours  ceFAZolin   IVPB 1000 milliGRAM(s) IV Intermittent every 24 hours  dextrose 40% Gel 15 Gram(s) Oral once  dextrose 5%. 1000 milliLiter(s) (50 mL/Hr) IV Continuous <Continuous>  dextrose 5%. 1000 milliLiter(s) (100 mL/Hr) IV Continuous <Continuous>  dextrose 50% Injectable 25 Gram(s) IV Push once  dextrose 50% Injectable 12.5 Gram(s) IV Push once  dextrose 50% Injectable 25 Gram(s) IV Push once  diltiazem    milliGRAM(s) Oral daily  DULoxetine 60 milliGRAM(s) Oral daily  epoetin juan-epbx (RETACRIT) Injectable 82829 Unit(s) IV Push <User Schedule>  glucagon  Injectable 1 milliGRAM(s) IntraMuscular once  heparin   Injectable 5000 Unit(s) SubCutaneous every 8 hours  hydrALAZINE 25 milliGRAM(s) Oral two times a day  insulin lispro (ADMELOG) corrective regimen sliding scale   SubCutaneous three times a day before meals  pantoprazole    Tablet 40 milliGRAM(s) Oral every 12 hours  polyethylene glycol 3350 17 Gram(s) Oral daily  senna 2 Tablet(s) Oral at bedtime  tamsulosin 0.4 milliGRAM(s) Oral at bedtime  torsemide 20 milliGRAM(s) Oral <User Schedule>      ASSESSMENT AND PLAN:    86y Male h/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  Episode of ? PAF/sinus tachy post HD.   Rectal bleeding- probably diverticular bleeding    HR stable on Cardizem  On Eliquis for DVT.   Known PAF in the past and pt was deemed high risk for anticoagulation for AF.   Plavix restarted on discharge given recent LLE bypass   H/H stable  BP controlled  Continue torsemide   Tolerated HD yesterday      Discussion :     HD TIW - Maintain Euvolemia,    EPO w. HD,     86yMale  with: reperfused left distal LE  resolving GI bleed  left CFV thrombus  left knee contracture due it inactivity  localized erythema to the left lateral thigh - resolved with abx  •   Plan:   - ABX till 5 day, then D/C ( IV ancef may be transitioned  to PO Keflex)   • cont sub-q heparin for now for DVT  - may restart low grade antiplatelet  Therapy,   - left knee with progressive flexion contraction due to extensive post surgical inactivity.  Patient requires aggressive Physical Therapy, needs to start an active regimen

## 2021-04-08 NOTE — CONSULT NOTE ADULT - SUBJECTIVE AND OBJECTIVE BOX
NEPHROLOGY CONSULTATION    CHIEF COMPLAINT:    HPI:  Pt is 87 yo M with PMH of HTN, HLD, CAD, HFpEF, s/p Right CEA for Carotid artery disease, ESRD on HD via LUE fistula, s/p flecainide w/ILR placed 6/2020, AS s/p TAVR, and PAD (RLE fem-pop bypass October 2020) who presented to [facility] on 3/13/21 with GI bleed, as well as LLE pain. On 3/15 went for EGD and multiple gastric AVM's were cauterized. S/p LLE angiogram on 3/18, and found occlusion of L Superficial femoral artery. S/p LLE fem-pop bypass on 3/20. On 3/22 s/p RRT for AMS, fever. Work-up revealed pleural effusions and L femoral DVT. He was started on abx for presumed pna and hep gtt for DVT. Went to OR on 3/25 for LLE bypass revision with explant of PTFE bypass, CFA to AT bypass performed. Intra-op patient with 1500 ml of blood loss requiring 8 unit PRBC, 4 unit FFP, 1 donor unit plt.  During procedure, pt lost function of PPM with period of asystole requiring transcutaneous pacing. On 3/27 patient was extubated. Adm to AR today. Due for HD tomorrow. Denies complaints, poor historian.    ROS:  as above    Allergies:  Plavix (Hives)  Toprol-XL (Rash)    PAST MEDICAL & SURGICAL HISTORY:  DM (diabetes mellitus)  AV block, 1st degree  CAD (coronary artery disease)  HTN (hypertension)  VT (ventricular tachycardia)  RICHARDS (dyspnea on exertion)  Anemia  ESRD on dialysis  Aortic stenosis  - s/p valve replacement  GI bleeding  due to ulcerated polyps and colonic AVMs  H/O left knee surgery  H/O angioplasty  2013,  no  intervention  A-V fistula  left arm 5/2017  H/O carotid endarterectomy  Right  S/P TAVR (transcatheter aortic valve replacement)  History of loop recorder    SOCIAL HISTORY:  negative    FAMILY HISTORY:  Family history of cancer (Grandparent)  Family history of lung cancer (Grandparent)  Family history of premature CAD  FH: type 2 diabetes    MEDICATIONS  (STANDING):  cephalexin 500 milliGRAM(s) Oral every 12 hours  clopidogrel Tablet 75 milliGRAM(s) Oral daily  dextrose 40% Gel 15 Gram(s) Oral once  dextrose 5%. 1000 milliLiter(s) (50 mL/Hr) IV Continuous <Continuous>  dextrose 5%. 1000 milliLiter(s) (100 mL/Hr) IV Continuous <Continuous>  dextrose 50% Injectable 25 Gram(s) IV Push once  dextrose 50% Injectable 12.5 Gram(s) IV Push once  dextrose 50% Injectable 25 Gram(s) IV Push once  diltiazem    milliGRAM(s) Oral daily  DULoxetine 60 milliGRAM(s) Oral daily  epoetin juan-epbx (RETACRIT) Injectable 77992 Unit(s) IV Push <User Schedule>  glucagon  Injectable 1 milliGRAM(s) IntraMuscular once  heparin   Injectable 5000 Unit(s) SubCutaneous every 8 hours  hydrALAZINE 25 milliGRAM(s) Oral two times a day  insulin lispro (ADMELOG) corrective regimen sliding scale   SubCutaneous three times a day before meals  insulin lispro (ADMELOG) corrective regimen sliding scale   SubCutaneous at bedtime  Nephro-pito 1 Tablet(s) Oral daily  pantoprazole    Tablet 40 milliGRAM(s) Oral two times a day  polyethylene glycol 3350 17 Gram(s) Oral daily  senna 2 Tablet(s) Oral at bedtime  tamsulosin 0.4 milliGRAM(s) Oral at bedtime  torsemide 20 milliGRAM(s) Oral <User Schedule>    Vital Signs Last 24 Hrs  T(C): 36.3 (04-08-21 @ 15:41), Max: 37.1 (04-08-21 @ 09:36)  T(F): 97.3 (04-08-21 @ 15:41), Max: 98.7 (04-08-21 @ 09:36)  HR: 89 (04-08-21 @ 15:41) (66 - 89)  BP: 169/74 (04-08-21 @ 15:41) (159/65 - 179/75)  RR: 18 (04-08-21 @ 15:41) (16 - 18)  SpO2: 95% (04-08-21 @ 15:41) (92% - 100%)    s1s2  b/l air entry  soft  ++ edema b/l BARBRA KIM AVSRI patent    LABS:                        9.7    8.24  )-----------( 565      ( 08 Apr 2021 06:13 )             30.6     A/P:    S/p complicated hospital course as per HPI  ESRD on HD  HD tomorrow  Epogen w/HD  Renal diet  TMP as able  Will follow  Rehab    [phone]

## 2021-04-08 NOTE — CHART NOTE - NSCHARTNOTESELECT_GEN_ALL_CORE
Event Note
Electrophysiology/Event Note
Event Note
Nutrition Services
Postoperative Exam/Event Note
RRT Note/Event Note

## 2021-04-09 LAB
ANION GAP SERPL CALC-SCNC: 9 MMOL/L — SIGNIFICANT CHANGE UP (ref 5–17)
BUN SERPL-MCNC: 45 MG/DL — HIGH (ref 7–23)
CALCIUM SERPL-MCNC: 8.6 MG/DL — SIGNIFICANT CHANGE UP (ref 8.4–10.5)
CHLORIDE SERPL-SCNC: 103 MMOL/L — SIGNIFICANT CHANGE UP (ref 96–108)
CO2 SERPL-SCNC: 27 MMOL/L — SIGNIFICANT CHANGE UP (ref 22–31)
COVID-19 SPIKE DOMAIN AB INTERP: POSITIVE
COVID-19 SPIKE DOMAIN ANTIBODY RESULT: >250 U/ML — HIGH
CREAT SERPL-MCNC: 4.3 MG/DL — HIGH (ref 0.5–1.3)
GLUCOSE BLDC GLUCOMTR-MCNC: 107 MG/DL — HIGH (ref 70–99)
GLUCOSE BLDC GLUCOMTR-MCNC: 118 MG/DL — HIGH (ref 70–99)
GLUCOSE BLDC GLUCOMTR-MCNC: 132 MG/DL — HIGH (ref 70–99)
GLUCOSE BLDC GLUCOMTR-MCNC: 142 MG/DL — HIGH (ref 70–99)
GLUCOSE SERPL-MCNC: 130 MG/DL — HIGH (ref 70–99)
HCT VFR BLD CALC: 28.4 % — LOW (ref 39–50)
HGB BLD-MCNC: 9.2 G/DL — LOW (ref 13–17)
MCHC RBC-ENTMCNC: 29.5 PG — SIGNIFICANT CHANGE UP (ref 27–34)
MCHC RBC-ENTMCNC: 32.4 GM/DL — SIGNIFICANT CHANGE UP (ref 32–36)
MCV RBC AUTO: 91 FL — SIGNIFICANT CHANGE UP (ref 80–100)
NRBC # BLD: 0 /100 WBCS — SIGNIFICANT CHANGE UP (ref 0–0)
PLATELET # BLD AUTO: 464 K/UL — HIGH (ref 150–400)
POTASSIUM SERPL-MCNC: 3.7 MMOL/L — SIGNIFICANT CHANGE UP (ref 3.5–5.3)
POTASSIUM SERPL-SCNC: 3.7 MMOL/L — SIGNIFICANT CHANGE UP (ref 3.5–5.3)
RBC # BLD: 3.12 M/UL — LOW (ref 4.2–5.8)
RBC # FLD: 15.9 % — HIGH (ref 10.3–14.5)
SARS-COV-2 IGG+IGM SERPL QL IA: >250 U/ML — HIGH
SARS-COV-2 IGG+IGM SERPL QL IA: POSITIVE
SODIUM SERPL-SCNC: 139 MMOL/L — SIGNIFICANT CHANGE UP (ref 135–145)
WBC # BLD: 9.58 K/UL — SIGNIFICANT CHANGE UP (ref 3.8–10.5)
WBC # FLD AUTO: 9.58 K/UL — SIGNIFICANT CHANGE UP (ref 3.8–10.5)

## 2021-04-09 PROCEDURE — 71045 X-RAY EXAM CHEST 1 VIEW: CPT | Mod: 26

## 2021-04-09 PROCEDURE — 99223 1ST HOSP IP/OBS HIGH 75: CPT

## 2021-04-09 PROCEDURE — 99233 SBSQ HOSP IP/OBS HIGH 50: CPT

## 2021-04-09 RX ORDER — HYDRALAZINE HCL 50 MG
25 TABLET ORAL ONCE
Refills: 0 | Status: COMPLETED | OUTPATIENT
Start: 2021-04-09 | End: 2021-04-09

## 2021-04-09 RX ADMIN — HEPARIN SODIUM 5000 UNIT(S): 5000 INJECTION INTRAVENOUS; SUBCUTANEOUS at 05:48

## 2021-04-09 RX ADMIN — Medication 1 TABLET(S): at 12:42

## 2021-04-09 RX ADMIN — SENNA PLUS 2 TABLET(S): 8.6 TABLET ORAL at 21:24

## 2021-04-09 RX ADMIN — Medication 500 MILLIGRAM(S): at 05:48

## 2021-04-09 RX ADMIN — DULOXETINE HYDROCHLORIDE 60 MILLIGRAM(S): 30 CAPSULE, DELAYED RELEASE ORAL at 12:42

## 2021-04-09 RX ADMIN — Medication 25 MILLIGRAM(S): at 09:33

## 2021-04-09 RX ADMIN — Medication 6 MILLIGRAM(S): at 21:23

## 2021-04-09 RX ADMIN — Medication 25 MILLIGRAM(S): at 05:48

## 2021-04-09 RX ADMIN — Medication 650 MILLIGRAM(S): at 06:40

## 2021-04-09 RX ADMIN — Medication 500 MILLIGRAM(S): at 17:56

## 2021-04-09 RX ADMIN — TAMSULOSIN HYDROCHLORIDE 0.4 MILLIGRAM(S): 0.4 CAPSULE ORAL at 21:24

## 2021-04-09 RX ADMIN — PANTOPRAZOLE SODIUM 40 MILLIGRAM(S): 20 TABLET, DELAYED RELEASE ORAL at 17:56

## 2021-04-09 RX ADMIN — PANTOPRAZOLE SODIUM 40 MILLIGRAM(S): 20 TABLET, DELAYED RELEASE ORAL at 05:48

## 2021-04-09 RX ADMIN — HEPARIN SODIUM 5000 UNIT(S): 5000 INJECTION INTRAVENOUS; SUBCUTANEOUS at 21:24

## 2021-04-09 RX ADMIN — Medication 650 MILLIGRAM(S): at 05:48

## 2021-04-09 RX ADMIN — ERYTHROPOIETIN 10000 UNIT(S): 10000 INJECTION, SOLUTION INTRAVENOUS; SUBCUTANEOUS at 16:34

## 2021-04-09 RX ADMIN — CLOPIDOGREL BISULFATE 75 MILLIGRAM(S): 75 TABLET, FILM COATED ORAL at 12:42

## 2021-04-09 RX ADMIN — Medication 240 MILLIGRAM(S): at 05:48

## 2021-04-09 RX ADMIN — POLYETHYLENE GLYCOL 3350 17 GRAM(S): 17 POWDER, FOR SOLUTION ORAL at 12:42

## 2021-04-09 RX ADMIN — Medication 25 MILLIGRAM(S): at 17:55

## 2021-04-09 NOTE — PROGRESS NOTE ADULT - ATTENDING COMMENTS
Chart reviewed. Patient seen at bedside  Per nursing, with poor sleep and confused overnight and attempting OOB. Will need enhanced supervision    2. Patient appears more dyspneic this, intermittently states that he is SOB, Last CXR on 4/7 with left pleural effusion and bilateral infiltrates.   Pulse ox mid 90s, patient placed transiently by RN on nasal O2 for comfort, though pulse ox was 94% on RA.   Discussed with renal about earlier HD today     3. Incisions - dressing changed - Medial and lateral thigh,( sutures)  left groin and lateral thigh (staples ) . Left foot warm to touch.    4. Labs reviewed  Case d/w hospitalist    Patient very frail, from medical comorbidities and recent complicated medical course and at high risk for complications and return back to acute care    Medical status and rehab potential discussed with daughter Vanessa Clemens - Nurse manager at [facility]  . Patient also with poor PO intake- will need 1:1 for assistance and encouragement with meals Chart reviewed. Patient seen at bedside  Per nursing, with poor sleep and confused overnight and attempting OOB. Will need enhanced supervision  Got melatonin overnight     2. Patient appears more dyspneic this, intermittently states that he is SOB, Last CXR on 4/7 with left pleural effusion and bilateral infiltrates.   Pulse ox mid 90s, patient placed transiently by RN on nasal O2 for comfort, though pulse ox was 94% on RA.   Discussed with renal about earlier HD today     3. Incisions - dressing changed - Medial and lateral thigh,( sutures)  left groin and lateral thigh (staples ) . Left foot warm to touch.    4. Labs reviewed  Case d/w hospitalist. Patient given extra dose of hydralazine for elevated BP today     Patient very frail, from medical comorbidities and recent complicated medical course and at high risk for complications and return back to acute care    Medical status and rehab potential discussed with daughter Vanessa Clemens - Nurse manager at [facility]  . Patient also with poor PO intake- will need 1:1 for assistance and encouragement with meals

## 2021-04-09 NOTE — PROGRESS NOTE ADULT - SUBJECTIVE AND OBJECTIVE BOX
Seen on HD    Vital Signs Last 24 Hrs  T(C): 36.7 (04-09-21 @ 18:06), Max: 37.1 (04-09-21 @ 08:28)  T(F): 98.1 (04-09-21 @ 18:06), Max: 98.7 (04-09-21 @ 08:28)  HR: 69 (04-09-21 @ 18:06) (57 - 97)  BP: 162/69 (04-09-21 @ 18:06) (156/74 - 184/69)  RR: 17 (04-09-21 @ 18:06) (14 - 17)  SpO2: 94% (04-09-21 @ 18:06) (94% - 100%)    s1s2  b/l air entry  soft  + edema b/l BARBRA KIM AVF patent                        9.2    9.58  )-----------( 464      ( 09 Apr 2021 06:30 )             28.4     09 Apr 2021 06:30    139    |  103    |  45     ----------------------------<  130    3.7     |  27     |  4.30     Ca    8.6        09 Apr 2021 06:30    acetaminophen   Tablet .. 650 milliGRAM(s) Oral every 6 hours PRN  artificial  tears Solution 1 Drop(s) Both EYES two times a day PRN  cephalexin 500 milliGRAM(s) Oral every 12 hours  clopidogrel Tablet 75 milliGRAM(s) Oral daily  dextrose 40% Gel 15 Gram(s) Oral once  dextrose 5%. 1000 milliLiter(s) IV Continuous <Continuous>  dextrose 5%. 1000 milliLiter(s) IV Continuous <Continuous>  dextrose 50% Injectable 25 Gram(s) IV Push once  dextrose 50% Injectable 12.5 Gram(s) IV Push once  dextrose 50% Injectable 25 Gram(s) IV Push once  diltiazem    milliGRAM(s) Oral daily  DULoxetine 60 milliGRAM(s) Oral daily  epoetin juan-epbx (RETACRIT) Injectable 63159 Unit(s) IV Push <User Schedule>  glucagon  Injectable 1 milliGRAM(s) IntraMuscular once  heparin   Injectable 5000 Unit(s) SubCutaneous every 8 hours  hydrALAZINE 25 milliGRAM(s) Oral two times a day  insulin lispro (ADMELOG) corrective regimen sliding scale   SubCutaneous three times a day before meals  insulin lispro (ADMELOG) corrective regimen sliding scale   SubCutaneous at bedtime  melatonin 6 milliGRAM(s) Oral at bedtime  Nephro-pito 1 Tablet(s) Oral daily  pantoprazole    Tablet 40 milliGRAM(s) Oral two times a day  polyethylene glycol 3350 17 Gram(s) Oral daily  senna 2 Tablet(s) Oral at bedtime  tamsulosin 0.4 milliGRAM(s) Oral at bedtime  torsemide 20 milliGRAM(s) Oral <User Schedule>    A/P:    S/p complicated hospital course as per HPI  ESRD on HD  HD MWF  Epogen w/HD  Renal diet  TMP as able  Rehab    [phone]

## 2021-04-09 NOTE — PROGRESS NOTE ADULT - SUBJECTIVE AND OBJECTIVE BOX
HPI:  Pt is a 87 y/o M with PMHx of HTN, HLD, CAD, HFpEF, s/p Right CEA for Carotid artery disease, ESRD on HD 2d/week (M/Fri) via LUE fistula, s/p flecainide w/ ILR placed 6/2020, AS s/p TAVR, and PAD (RLE fem-pop bypass October 2020) who presented to [facility] on 3/13/21 with GI bleed, as well as LLE pain. 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 When patient was stable from GI, medical, cardiac standpoint, he went for LLE angiogram on 3/18, and found occlusion of L Superficial femoral artery. He was planned for a LLE fem-pop bypass, and had the bypass performed on 3/20. On 3/22, patient was a RRT for AMS, fever. Work-up revealed pleural effusions and L femoral DVT. He was started on abx for presumed pna and hep gtt for DVT. Patient began to show signs of malperfusion with coolness to touch, mottling of skin to LLE. He was planned for revision of LLE bypass. Went to OR on 3/25 for LLE bypass revision with explant of PTFE bypass, CFA to AT bypass with cryovein performed. Intra-op patient with 1500 ml of blood loss requiring 8 unit PRBC, 4 unit FFP, 1 donor unit plt.  During procedure, pt lost function of PPM with period of asystole requiring transcutaneous pacing. Cardiology consulted and pacemaker interrogated in the OR.  Pt taken to PACU post op and SICU consulted. Patient went to SICU post-op. On 3/27 patient was extubated and was downgraded from SICU to floor the next day. Rest of hospital course was uneventful. He worked with PT, was evaluated by PM&R and approved for acute rehab. The patient remained on heparin sq while in the hospital for tx of dvt (his plavix was held). Upon discharge, approved by CHALINO and Dr. Albrecht to resume plavix. He will remain on heparin sq while at rehab for dvt tx and ppx. He is not a candidate for IVC filter. Upon discharge, pain is well controlled, tolerating diet, remains HD stable, no clinical signs/symptoms of GI bleed, LLE remains warm and well perfused, he has a strong LLE palpable graft pulse and dopplerable signals. Per attending, he is medically stable for discharge to [facility] and on 4/8/21 he was transferred to [facility] for acute inpatient rehabilitation.    SUBJECTIVE / INTERVAL HPI: Patient seen and examined at bedside. Pt had trouble sleeping overnight and seemed to be moaning in pain. He received melatonin to help him sleep. He is due for dialysis today and seems to be a little more short of breath and a little more tired this morning.     REVIEW OF SYSTEMS:    CONSTITUTIONAL: No fevers or chills  EYES/ENT: No visual changes;  No vertigo or throat pain   NECK: No pain or stiffness  RESPIRATORY: No cough, wheezing, +mild shortness of breath  CARDIOVASCULAR: No chest pain or palpitations  GASTROINTESTINAL: No abdominal or epigastric pain. No nausea or vomiting. No diarrhea or constipation.   GENITOURINARY: No dysuria, frequency or hematuria  NEUROLOGICAL: No numbness, +weakness  SKIN: No itching, rashes, +left groin and leg incision sites, +heel wounds, +sacral wounds      VITAL SIGNS:  Vital Signs Last 24 Hrs  T(C): 37.1 (09 Apr 2021 08:28), Max: 37.1 (09 Apr 2021 08:28)  T(F): 98.7 (09 Apr 2021 08:28), Max: 98.7 (09 Apr 2021 08:28)  HR: 80 (09 Apr 2021 08:28) (66 - 97)  BP: 160/72 (09 Apr 2021 08:28) (160/72 - 172/81)  BP(mean): --  RR: 17 (09 Apr 2021 08:28) (14 - 18)  SpO2: 95% (09 Apr 2021 08:28) (94% - 97%)    PHYSICAL EXAM:    General: NAD, sitting up in bed  HEENT: NC/AT; PERRL, anicteric sclera; MMM  Neck: supple  Cardiovascular: +S1/S2, RRR  Respiratory: CTA B/L; no W/R/R, mild crackles at bases  Gastrointestinal: soft, NT/ND  Extremities: LUE with AV fistula, bilateral lower extremity edema with mild tenderness to touch. Feet warm to touch  Neurological: AAOx3; no focal deficits  Skin: bilateral heel pressure ulcers, Left groin and lateral thigh with staples. Left calf incision lateral and medial with dressing present. Gangrenous toes on left foot. Decubitus sacral ulcer present    MEDICATIONS:  MEDICATIONS  (STANDING):  cephalexin 500 milliGRAM(s) Oral every 12 hours  clopidogrel Tablet 75 milliGRAM(s) Oral daily  dextrose 40% Gel 15 Gram(s) Oral once  dextrose 5%. 1000 milliLiter(s) (50 mL/Hr) IV Continuous <Continuous>  dextrose 5%. 1000 milliLiter(s) (100 mL/Hr) IV Continuous <Continuous>  dextrose 50% Injectable 25 Gram(s) IV Push once  dextrose 50% Injectable 12.5 Gram(s) IV Push once  dextrose 50% Injectable 25 Gram(s) IV Push once  diltiazem    milliGRAM(s) Oral daily  DULoxetine 60 milliGRAM(s) Oral daily  epoetin juan-epbx (RETACRIT) Injectable 34803 Unit(s) IV Push <User Schedule>  glucagon  Injectable 1 milliGRAM(s) IntraMuscular once  heparin   Injectable 5000 Unit(s) SubCutaneous every 8 hours  hydrALAZINE 25 milliGRAM(s) Oral two times a day  insulin lispro (ADMELOG) corrective regimen sliding scale   SubCutaneous three times a day before meals  insulin lispro (ADMELOG) corrective regimen sliding scale   SubCutaneous at bedtime  melatonin 6 milliGRAM(s) Oral at bedtime  Nephro-pito 1 Tablet(s) Oral daily  pantoprazole    Tablet 40 milliGRAM(s) Oral two times a day  polyethylene glycol 3350 17 Gram(s) Oral daily  senna 2 Tablet(s) Oral at bedtime  tamsulosin 0.4 milliGRAM(s) Oral at bedtime  torsemide 20 milliGRAM(s) Oral <User Schedule>    MEDICATIONS  (PRN):  acetaminophen   Tablet .. 650 milliGRAM(s) Oral every 6 hours PRN Temp greater or equal to 38C (100.4F), Mild Pain (1 - 3)  artificial  tears Solution 1 Drop(s) Both EYES two times a day PRN Dry Eyes      ALLERGIES:  Allergies    Plavix (Hives)  Toprol-XL (Rash)    Intolerances        LABS:                        9.2    9.58  )-----------( 464      ( 09 Apr 2021 06:30 )             28.4     04-09    139  |  103  |  45<H>  ----------------------------<  130<H>  3.7   |  27  |  4.30<H>    Ca    8.6      09 Apr 2021 06:30          CAPILLARY BLOOD GLUCOSE      POCT Blood Glucose.: 118 mg/dL (09 Apr 2021 08:14)      RADIOLOGY & ADDITIONAL TESTS: Reviewed. HPI:  Pt is a 87 y/o M with PMHx of HTN, HLD, CAD, HFpEF, s/p Right CEA for Carotid artery disease, ESRD on HD 2d/week (M/Fri) via LUE fistula, s/p flecainide w/ ILR placed 6/2020, AS s/p TAVR, and PAD (RLE fem-pop bypass October 2020) who presented to [facility] on 3/13/21 with GI bleed, as well as LLE pain. 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 When patient was stable from GI, medical, cardiac standpoint, he went for LLE angiogram on 3/18, and found occlusion of L Superficial femoral artery. He was planned for a LLE fem-pop bypass, and had the bypass performed on 3/20. On 3/22, patient was a RRT for AMS, fever. Work-up revealed pleural effusions and L femoral DVT. He was started on abx for presumed pna and hep gtt for DVT. Patient began to show signs of malperfusion with coolness to touch, mottling of skin to LLE. He was planned for revision of LLE bypass. Went to OR on 3/25 for LLE bypass revision with explant of PTFE bypass, CFA to AT bypass with cryovein performed. Intra-op patient with 1500 ml of blood loss requiring 8 unit PRBC, 4 unit FFP, 1 donor unit plt.  During procedure, pt lost function of PPM with period of asystole requiring transcutaneous pacing. Cardiology consulted and pacemaker interrogated in the OR.  Pt taken to PACU post op and SICU consulted. Patient went to SICU post-op. On 3/27 patient was extubated and was downgraded from SICU to floor the next day. Rest of hospital course was uneventful. He worked with PT, was evaluated by PM&R and approved for acute rehab. The patient remained on heparin sq while in the hospital for tx of dvt (his plavix was held). Upon discharge, approved by CHALINO and Dr. Albrecht to resume plavix. He will remain on heparin sq while at rehab for dvt tx and ppx. He is not a candidate for IVC filter. Upon discharge, pain is well controlled, tolerating diet, remains HD stable, no clinical signs/symptoms of GI bleed, LLE remains warm and well perfused, he has a strong LLE palpable graft pulse and dopplerable signals. Per attending, he is medically stable for discharge to [facility] and on 4/8/21 he was transferred to [facility] for acute inpatient rehabilitation.    SUBJECTIVE / INTERVAL HPI: Patient seen and examined at bedside. Pt had trouble sleeping overnight and seemed to be moaning in pain. He received melatonin to help him sleep. He is due for dialysis today and seems to be a little more short of breath and a little more tired this morning.     REVIEW OF SYSTEMS:    CONSTITUTIONAL: No fevers or chills  EYES/ENT: No visual changes;  No vertigo or throat pain   NECK: No pain or stiffness  RESPIRATORY: No cough, wheezing, +mild shortness of breath  CARDIOVASCULAR: No chest pain or palpitations  GASTROINTESTINAL: No abdominal or epigastric pain. No nausea or vomiting. No diarrhea or constipation.   GENITOURINARY: No dysuria, frequency or hematuria  NEUROLOGICAL: No numbness, +weakness  SKIN: No itching, rashes, +left groin and leg incision sites, +heel wounds, +sacral wounds      VITAL SIGNS:  Vital Signs Last 24 Hrs  T(C): 37.1 (09 Apr 2021 08:28), Max: 37.1 (09 Apr 2021 08:28)  T(F): 98.7 (09 Apr 2021 08:28), Max: 98.7 (09 Apr 2021 08:28)  HR: 80 (09 Apr 2021 08:28) (66 - 97)  BP: 160/72 (09 Apr 2021 08:28) (160/72 - 172/81)  BP(mean): --  RR: 17 (09 Apr 2021 08:28) (14 - 18)  SpO2: 95% (09 Apr 2021 08:28) (94% - 97%)    PHYSICAL EXAM:    General: NAD, sitting up in bed  HEENT: NC/AT; PERRL, anicteric sclera; MMM  Neck: supple  Cardiovascular: +S1/S2, RRR  Respiratory: CTA B/L; no W/R/R, mild crackles at bases  Gastrointestinal: soft, NT/ND  Extremities: LUE with AV fistula, bilateral lower extremity edema with mild tenderness to touch. Feet warm to touch  Neurological: AAOx3; no focal deficits  Skin: bilateral heel pressure ulcers DTI, Left groin and lateral thigh with staples. Left calf incision lateral and medial with dressing present. Gangrenous toes on left foot. Decubitus sacral and buttock ulcer present    MEDICATIONS:  MEDICATIONS  (STANDING):  cephalexin 500 milliGRAM(s) Oral every 12 hours  clopidogrel Tablet 75 milliGRAM(s) Oral daily  dextrose 40% Gel 15 Gram(s) Oral once  dextrose 5%. 1000 milliLiter(s) (50 mL/Hr) IV Continuous <Continuous>  dextrose 5%. 1000 milliLiter(s) (100 mL/Hr) IV Continuous <Continuous>  dextrose 50% Injectable 25 Gram(s) IV Push once  dextrose 50% Injectable 12.5 Gram(s) IV Push once  dextrose 50% Injectable 25 Gram(s) IV Push once  diltiazem    milliGRAM(s) Oral daily  DULoxetine 60 milliGRAM(s) Oral daily  epoetin juan-epbx (RETACRIT) Injectable 38547 Unit(s) IV Push <User Schedule>  glucagon  Injectable 1 milliGRAM(s) IntraMuscular once  heparin   Injectable 5000 Unit(s) SubCutaneous every 8 hours  hydrALAZINE 25 milliGRAM(s) Oral two times a day  insulin lispro (ADMELOG) corrective regimen sliding scale   SubCutaneous three times a day before meals  insulin lispro (ADMELOG) corrective regimen sliding scale   SubCutaneous at bedtime  melatonin 6 milliGRAM(s) Oral at bedtime  Nephro-pito 1 Tablet(s) Oral daily  pantoprazole    Tablet 40 milliGRAM(s) Oral two times a day  polyethylene glycol 3350 17 Gram(s) Oral daily  senna 2 Tablet(s) Oral at bedtime  tamsulosin 0.4 milliGRAM(s) Oral at bedtime  torsemide 20 milliGRAM(s) Oral <User Schedule>    MEDICATIONS  (PRN):  acetaminophen   Tablet .. 650 milliGRAM(s) Oral every 6 hours PRN Temp greater or equal to 38C (100.4F), Mild Pain (1 - 3)  artificial  tears Solution 1 Drop(s) Both EYES two times a day PRN Dry Eyes      ALLERGIES:  Allergies    Plavix (Hives)  Toprol-XL (Rash)    Intolerances        LABS:                        9.2    9.58  )-----------( 464      ( 09 Apr 2021 06:30 )             28.4     04-09    139  |  103  |  45<H>  ----------------------------<  130<H>  3.7   |  27  |  4.30<H>    Ca    8.6      09 Apr 2021 06:30          CAPILLARY BLOOD GLUCOSE      POCT Blood Glucose.: 118 mg/dL (09 Apr 2021 08:14)      RADIOLOGY & ADDITIONAL TESTS: Reviewed. HPI:  Pt is a 85 y/o M with PMHx of HTN, HLD, CAD, HFpEF, s/p Right CEA for Carotid artery disease, ESRD on HD 2d/week (M/Fri) via LUE fistula, s/p flecainide w/ ILR placed 6/2020, AS s/p TAVR, and PAD (RLE fem-pop bypass October 2020) who presented to [facility] on 3/13/21 with GI bleed, as well as LLE pain. 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 When patient was stable from GI, medical, cardiac standpoint, he went for LLE angiogram on 3/18, and found occlusion of L Superficial femoral artery. He was planned for a LLE fem-pop bypass, and had the bypass performed on 3/20. On 3/22, patient was a RRT for AMS, fever. Work-up revealed pleural effusions and L femoral DVT. He was started on abx for presumed pna and hep gtt for DVT. Patient began to show signs of malperfusion with coolness to touch, mottling of skin to LLE. He was planned for revision of LLE bypass. Went to OR on 3/25 for LLE bypass revision with explant of PTFE bypass, CFA to AT bypass with cryovein performed. Intra-op patient with 1500 ml of blood loss requiring 8 unit PRBC, 4 unit FFP, 1 donor unit plt.  During procedure, pt lost function of PPM with period of asystole requiring transcutaneous pacing. Cardiology consulted and pacemaker interrogated in the OR.  Pt taken to PACU post op and SICU consulted. Patient went to SICU post-op. On 3/27 patient was extubated and was downgraded from SICU to floor the next day. Rest of hospital course was uneventful. He worked with PT, was evaluated by PM&R and approved for acute rehab. The patient remained on heparin sq while in the hospital for tx of dvt (his plavix was held). Upon discharge, approved by CHALINO and Dr. Albrecht to resume plavix. He will remain on heparin sq while at rehab for dvt tx and ppx. He is not a candidate for IVC filter. Upon discharge, pain is well controlled, tolerating diet, remains HD stable, no clinical signs/symptoms of GI bleed, LLE remains warm and well perfused, he has a strong LLE palpable graft pulse and dopplerable signals. Per attending, he is medically stable for discharge to [facility] and on 4/8/21 he was transferred to [facility] for acute inpatient rehabilitation.    SUBJECTIVE / INTERVAL HPI: Patient seen and examined at bedside. Pt had trouble sleeping overnight and seemed to be moaning in pain. He received melatonin to help him sleep. He is due for dialysis today and seems to be a little more short of breath and a little more tired this morning.     REVIEW OF SYSTEMS:    CONSTITUTIONAL: No fevers or chills  EYES/ENT: No visual changes;  No vertigo or throat pain   NECK: No pain or stiffness  RESPIRATORY: No cough, wheezing, +mild shortness of breath  CARDIOVASCULAR: No chest pain or palpitations  GASTROINTESTINAL: No abdominal or epigastric pain.   GENITOURINARY: No dysuria, frequency or hematuria  NEUROLOGICAL: No numbness, +weakness  SKIN: No itching, rashes, +left groin and leg incision sites, +heel wounds, +sacral wounds      VITAL SIGNS:  Vital Signs Last 24 Hrs  T(C): 37.1 (09 Apr 2021 08:28), Max: 37.1 (09 Apr 2021 08:28)  T(F): 98.7 (09 Apr 2021 08:28), Max: 98.7 (09 Apr 2021 08:28)  HR: 80 (09 Apr 2021 08:28) (66 - 97)  BP: 160/72 (09 Apr 2021 08:28) (160/72 - 172/81)  BP(mean): --  RR: 17 (09 Apr 2021 08:28) (14 - 18)  SpO2: 95% (09 Apr 2021 08:28) (94% - 97%)    PHYSICAL EXAM:    General: NAD, sitting up in bed  HEENT: NC/AT; PERRL, anicteric sclera; MMM  Neck: supple  Cardiovascular: +S1/S2, RRR  Respiratory: CTA B/L; no W/R/R, mild crackles at bases  Gastrointestinal: soft, NT/ND  Extremities: LUE with AV fistula, bilateral lower extremity edema with mild tenderness to touch. Feet warm to touch  Neurological: AAOx3; no focal deficits  Skin: Decubitu sbilateral heel pressure ulcers DTI, Left groin and lateral thigh with staples. Left calf incision lateral and medial with dressing present. Gangrenous toes on left foot. Decubitus sacral and buttock ulcer present    MEDICATIONS:  MEDICATIONS  (STANDING):  cephalexin 500 milliGRAM(s) Oral every 12 hours  clopidogrel Tablet 75 milliGRAM(s) Oral daily  dextrose 40% Gel 15 Gram(s) Oral once  dextrose 5%. 1000 milliLiter(s) (50 mL/Hr) IV Continuous <Continuous>  dextrose 5%. 1000 milliLiter(s) (100 mL/Hr) IV Continuous <Continuous>  dextrose 50% Injectable 25 Gram(s) IV Push once  dextrose 50% Injectable 12.5 Gram(s) IV Push once  dextrose 50% Injectable 25 Gram(s) IV Push once  diltiazem    milliGRAM(s) Oral daily  DULoxetine 60 milliGRAM(s) Oral daily  epoetin juan-epbx (RETACRIT) Injectable 83044 Unit(s) IV Push <User Schedule>  glucagon  Injectable 1 milliGRAM(s) IntraMuscular once  heparin   Injectable 5000 Unit(s) SubCutaneous every 8 hours  hydrALAZINE 25 milliGRAM(s) Oral two times a day  insulin lispro (ADMELOG) corrective regimen sliding scale   SubCutaneous three times a day before meals  insulin lispro (ADMELOG) corrective regimen sliding scale   SubCutaneous at bedtime  melatonin 6 milliGRAM(s) Oral at bedtime  Nephro-pito 1 Tablet(s) Oral daily  pantoprazole    Tablet 40 milliGRAM(s) Oral two times a day  polyethylene glycol 3350 17 Gram(s) Oral daily  senna 2 Tablet(s) Oral at bedtime  tamsulosin 0.4 milliGRAM(s) Oral at bedtime  torsemide 20 milliGRAM(s) Oral <User Schedule>    MEDICATIONS  (PRN):  acetaminophen   Tablet .. 650 milliGRAM(s) Oral every 6 hours PRN Temp greater or equal to 38C (100.4F), Mild Pain (1 - 3)  artificial  tears Solution 1 Drop(s) Both EYES two times a day PRN Dry Eyes      ALLERGIES:  Allergies    Plavix (Hives)  Toprol-XL (Rash)    Intolerances        LABS:                        9.2    9.58  )-----------( 464      ( 09 Apr 2021 06:30 )             28.4     04-09    139  |  103  |  45<H>  ----------------------------<  130<H>  3.7   |  27  |  4.30<H>    Ca    8.6      09 Apr 2021 06:30          CAPILLARY BLOOD GLUCOSE      POCT Blood Glucose.: 118 mg/dL (09 Apr 2021 08:14)      RADIOLOGY & ADDITIONAL TESTS: Reviewed. HPI:  Pt is a 87 y/o M with PMHx of HTN, HLD, CAD, HFpEF, s/p Right CEA for Carotid artery disease, ESRD on HD 2d/week (M/Fri) via LUE fistula, s/p flecainide w/ ILR placed 6/2020, AS s/p TAVR, and PAD (RLE fem-pop bypass October 2020) who presented to [facility] on 3/13/21 with GI bleed, as well as LLE pain. 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 When patient was stable from GI, medical, cardiac standpoint, he went for LLE angiogram on 3/18, and found occlusion of L Superficial femoral artery. He was planned for a LLE fem-pop bypass, and had the bypass performed on 3/20. On 3/22, patient was a RRT for AMS, fever. Work-up revealed pleural effusions and L femoral DVT. He was started on abx for presumed pna and hep gtt for DVT. Patient began to show signs of malperfusion with coolness to touch, mottling of skin to LLE. He was planned for revision of LLE bypass. Went to OR on 3/25 for LLE bypass revision with explant of PTFE bypass, CFA to AT bypass with cryovein performed. Intra-op patient with 1500 ml of blood loss requiring 8 unit PRBC, 4 unit FFP, 1 donor unit plt.  During procedure, pt lost function of PPM with period of asystole requiring transcutaneous pacing. Cardiology consulted and pacemaker interrogated in the OR.  Pt taken to PACU post op and SICU consulted. Patient went to SICU post-op. On 3/27 patient was extubated and was downgraded from SICU to floor the next day. Rest of hospital course was uneventful. He worked with PT, was evaluated by PM&R and approved for acute rehab. The patient remained on heparin sq while in the hospital for tx of dvt (his plavix was held). Upon discharge, approved by CHALINO and Dr. Albrecht to resume plavix. He will remain on heparin sq while at rehab for dvt tx and ppx. He is not a candidate for IVC filter. Upon discharge, pain is well controlled, tolerating diet, remains HD stable, no clinical signs/symptoms of GI bleed, LLE remains warm and well perfused, he has a strong LLE palpable graft pulse and dopplerable signals. Per attending, he is medically stable for discharge to AR and on 4/8/21 he was transferred to [facility] for acute inpatient rehabilitation.    SUBJECTIVE / INTERVAL HPI: Patient seen and examined at bedside. Pt had trouble sleeping overnight and seemed to be moaning in pain. He received melatonin to help him sleep. He is due for dialysis today and seems to be a little more short of breath and a little more tired this morning.     REVIEW OF SYSTEMS:    CONSTITUTIONAL: No fevers or chills  EYES/ENT: No visual changes;  No vertigo or throat pain   NECK: No pain or stiffness  RESPIRATORY: No cough, wheezing, +mild shortness of breath  CARDIOVASCULAR: No chest pain or palpitations  GASTROINTESTINAL: No abdominal or epigastric pain.   GENITOURINARY: No dysuria, frequency or hematuria  NEUROLOGICAL: No numbness, +weakness  SKIN: No itching, rashes, +left groin and leg incision sites, +heel wounds, +sacral wounds      VITAL SIGNS:  Vital Signs Last 24 Hrs  T(C): 37.1 (09 Apr 2021 08:28), Max: 37.1 (09 Apr 2021 08:28)  T(F): 98.7 (09 Apr 2021 08:28), Max: 98.7 (09 Apr 2021 08:28)  HR: 80 (09 Apr 2021 08:28) (66 - 97)  BP: 160/72 (09 Apr 2021 08:28) (160/72 - 172/81)  BP(mean): --  RR: 17 (09 Apr 2021 08:28) (14 - 18)  SpO2: 95% (09 Apr 2021 08:28) (94% - 97%)    PHYSICAL EXAM:    General: NAD, sitting up in bed  HEENT: NC/AT; PERRL, anicteric sclera; MMM  Neck: supple  Cardiovascular: +S1/S2, RRR  Respiratory: CTA B/L; no W/R/R, mild crackles at bases  Gastrointestinal: soft, NT/ND  Extremities: LUE with AV fistula, bilateral lower extremity edema with mild tenderness to touch. Feet warm to touch  Neurological: AAOx3; no focal deficits  Skin: Decubitus bilateral heel pressure ulcers DTI, Left groin and lateral thigh with staples. Left calf incision lateral and medial with dressing present. Gangrenous toes on left foot. Decubitus sacral and buttock ulcers present    MEDICATIONS:  MEDICATIONS  (STANDING):  cephalexin 500 milliGRAM(s) Oral every 12 hours  clopidogrel Tablet 75 milliGRAM(s) Oral daily  dextrose 40% Gel 15 Gram(s) Oral once  dextrose 5%. 1000 milliLiter(s) (50 mL/Hr) IV Continuous <Continuous>  dextrose 5%. 1000 milliLiter(s) (100 mL/Hr) IV Continuous <Continuous>  dextrose 50% Injectable 25 Gram(s) IV Push once  dextrose 50% Injectable 12.5 Gram(s) IV Push once  dextrose 50% Injectable 25 Gram(s) IV Push once  diltiazem    milliGRAM(s) Oral daily  DULoxetine 60 milliGRAM(s) Oral daily  epoetin juan-epbx (RETACRIT) Injectable 07533 Unit(s) IV Push <User Schedule>  glucagon  Injectable 1 milliGRAM(s) IntraMuscular once  heparin   Injectable 5000 Unit(s) SubCutaneous every 8 hours  hydrALAZINE 25 milliGRAM(s) Oral two times a day  insulin lispro (ADMELOG) corrective regimen sliding scale   SubCutaneous three times a day before meals  insulin lispro (ADMELOG) corrective regimen sliding scale   SubCutaneous at bedtime  melatonin 6 milliGRAM(s) Oral at bedtime  Nephro-pito 1 Tablet(s) Oral daily  pantoprazole    Tablet 40 milliGRAM(s) Oral two times a day  polyethylene glycol 3350 17 Gram(s) Oral daily  senna 2 Tablet(s) Oral at bedtime  tamsulosin 0.4 milliGRAM(s) Oral at bedtime  torsemide 20 milliGRAM(s) Oral <User Schedule>    MEDICATIONS  (PRN):  acetaminophen   Tablet .. 650 milliGRAM(s) Oral every 6 hours PRN Temp greater or equal to 38C (100.4F), Mild Pain (1 - 3)  artificial  tears Solution 1 Drop(s) Both EYES two times a day PRN Dry Eyes      ALLERGIES:  Allergies    Plavix (Hives)  Toprol-XL (Rash)    Intolerances        LABS:                        9.2    9.58  )-----------( 464      ( 09 Apr 2021 06:30 )             28.4     04-09    139  |  103  |  45<H>  ----------------------------<  130<H>  3.7   |  27  |  4.30<H>    Ca    8.6      09 Apr 2021 06:30          CAPILLARY BLOOD GLUCOSE      POCT Blood Glucose.: 118 mg/dL (09 Apr 2021 08:14)      RADIOLOGY & ADDITIONAL TESTS: Reviewed.

## 2021-04-09 NOTE — DIETITIAN INITIAL EVALUATION ADULT. - PERTINENT LABORATORY DATA
(4/9)  Na: 139, K: 3.7. BUN: 45 H, Cr: 4.30 H, Glu: 130 H  A1C: 5.7% (3/26)    CAPILLARY BLOOD GLUCOSE:  POCT Blood Glucose.: 118 mg/dL (09 Apr 2021 08:14)  POCT Blood Glucose.: 127 mg/dL (08 Apr 2021 22:12)  POCT Blood Glucose.: 108 mg/dL (08 Apr 2021 16:52)  POCT Blood Glucose.: 133 mg/dL (08 Apr 2021 12:03)

## 2021-04-09 NOTE — DIETITIAN INITIAL EVALUATION ADULT. - PERTINENT MEDS FT
MEDICATIONS  (STANDING):  cephalexin 500 milliGRAM(s) Oral every 12 hours  clopidogrel Tablet 75 milliGRAM(s) Oral daily  dextrose 40% Gel 15 Gram(s) Oral once  dextrose 5%. 1000 milliLiter(s) (50 mL/Hr) IV Continuous <Continuous>  dextrose 5%. 1000 milliLiter(s) (100 mL/Hr) IV Continuous <Continuous>  dextrose 50% Injectable 25 Gram(s) IV Push once  dextrose 50% Injectable 12.5 Gram(s) IV Push once  dextrose 50% Injectable 25 Gram(s) IV Push once  diltiazem    milliGRAM(s) Oral daily  DULoxetine 60 milliGRAM(s) Oral daily  epoetin juan-epbx (RETACRIT) Injectable 99056 Unit(s) IV Push <User Schedule>  glucagon  Injectable 1 milliGRAM(s) IntraMuscular once  heparin   Injectable 5000 Unit(s) SubCutaneous every 8 hours  hydrALAZINE 25 milliGRAM(s) Oral two times a day  insulin lispro (ADMELOG) corrective regimen sliding scale   SubCutaneous three times a day before meals  insulin lispro (ADMELOG) corrective regimen sliding scale   SubCutaneous at bedtime  melatonin 6 milliGRAM(s) Oral at bedtime  Nephro-pito 1 Tablet(s) Oral daily  pantoprazole    Tablet 40 milliGRAM(s) Oral two times a day  polyethylene glycol 3350 17 Gram(s) Oral daily  senna 2 Tablet(s) Oral at bedtime  tamsulosin 0.4 milliGRAM(s) Oral at bedtime  torsemide 20 milliGRAM(s) Oral <User Schedule>    MEDICATIONS  (PRN):  acetaminophen   Tablet .. 650 milliGRAM(s) Oral every 6 hours PRN Temp greater or equal to 38C (100.4F), Mild Pain (1 - 3)  artificial  tears Solution 1 Drop(s) Both EYES two times a day PRN Dry Eyes

## 2021-04-09 NOTE — CONSULT NOTE ADULT - ASSESSMENT
85 y/o M with extensive PMH including HTN, HLD, CAD, HFpEF, s/p Right CEA for Carotid artery disease, ESRD on HD 2d/week (M/Fri) via LUE fistula, s/p flecainide w/ ILR placed 6/2020, AS s/p TAVR, and PAD (RLE fem-pop bypass October 2020) who presented to [facility] on 3/13/21 with GI bleed, as well as LLE pain due to occulsion of L superficial fem artery.  He had a complicated hospital course and also had acute DVT, PNA, bilateral pleural effusions.  Pt apparently was not a candidate for an IVC filter.  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   3/20: Occlusion of L Superficial femoral artery; s/p LLE fem-pop bypass  3/25:  Revision of LLE bypass with explant of PTFE bypass, CFA to AT bypass with cryovein; Intra-op patient with 1500 ml of blood loss requiring 8 unit PRBC, 4 unit FFP, 1 donor unit plt.          85 y/o M with extensive PMH including HTN, HLD, CAD, HFpEF, s/p Right CEA for Carotid artery disease, ESRD on HD 2d/week (M/Fri) via LUE fistula, s/p flecainide w/ ILR placed 6/2020, AS s/p TAVR, and PAD (RLE fem-pop bypass October 2020) who presented to [facility] on 3/13/21 with GI bleed, as well as LLE pain due to occulsion of L superficial fem artery.  He had a complicated hospital course and also had acute DVT, PNA, bilateral pleural effusions.  Pt apparently was not a candidate for an IVC filter.  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   3/20: Occlusion of L Superficial femoral artery; s/p LLE fem-pop bypass  3/25:  Revision of LLE bypass with explant of PTFE bypass, CFA to AT bypass with cryovein; Intra-op patient with 1500 ml of blood loss requiring 8 unit PRBC, 4 unit FFP, 1 donor unit plt.     #PAD  - s/p fem-pop bypass and revision  - Keflex 500mg bid for 2 days to complete 5 day course for skin erythema around his incision  - Pain control  - cleared to resume Plavix 75mg  - PT/OT/ST    #Acute left femoral vein DVT  - on Heparin tid  Per chart - patient not a candidate for IVC filter    #Acute blood loss anemia, stable, likely due to UGIB  - egd showed multiple AVMs, cauterized during EGD  - Protonix bid    #ESRD on HD  - on M/F dialysis with extra dialysis intermittently  - Renal consult  - Epoetin Yanick with dialysis    #Chronic CHF  - continue Toresemide 20mg 6 days a week  - continue Cardizem 240mg daily    #HTN  - continue Hydralazine 25mg bid    #DM  - ISS  - fignersticks    #Pain  - Cymbalta 60mg daily  - Tylenol PRN  Gabapentin 100mg BID     #BPH  - continue Flomax     #GI  - Miralax, senna    #  - bladder scan on admission and straight cath as necessary    #Diet  - Mechanical Soft  - Consistent Carb, DASH/TLC, 1500mL fluid restriction, Renal diet    DVT PPX:     85 y/o M with extensive PMH including HTN, HLD, CAD, HFpEF, s/p Right CEA for Carotid artery disease, ESRD on HD 2d/week (M/Fri) via LUE fistula, s/p flecainide w/ ILR placed 6/2020, AS s/p TAVR, and PAD (RLE fem-pop bypass October 2020) who presented to [facility] on 3/13/21 with GI bleed, as well as LLE pain due to occulsion of L superficial fem artery.  He had a complicated hospital course and also had acute DVT, PNA, bilateral pleural effusions.  Pt apparently was not a candidate for an IVC filter.  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   3/20: Occlusion of L Superficial femoral artery; s/p LLE fem-pop bypass  3/25:  Revision of LLE bypass with explant of PTFE bypass, CFA to AT bypass with cryovein; Intra-op patient with 1500 ml of blood loss requiring 8 unit PRBC, 4 unit FFP, 1 donor unit plt.   #ESRD on HD  - on M/F dialysis with extra dialysis intermittently  - Spoke with renal; will get dialysis today and possibly in AM  - Urgent CXR ordered; will monitor respiratory status  - Epoetin Yanick with dialysis  #PAD  - s/p fem-pop bypass and revision  - Keflex 500mg bid for 2 days to complete 5 day course for skin erythema around his incision  - Pain control  - cleared to resume Plavix 75mg  - PT/OT/ST    #Acute left femoral vein DVT  - on Heparin 5000 units q 8hrs, since pt with GIB  - Per chart - patient not a candidate for IVC filter??    #Acute blood loss anemia, stable, likely due to UGIB  - EGD showed multiple AVMs, cauterized during EGD  - Protonix 40mg po bid  - Monitor Hg/Hct, transfuse if Hg <8  - EPO with dialysis on M + F    #Chronic diastolic CHF, stable  #Bioprosthetic aortic valve  - TTE done 3/14/2021; reviewed reading  - AV normally functioning  - continue Toresemide 20mg 6 days a week; patient still urinates; aim for euvolemia  - continue Cardizem 240mg daily    #HTN  - continue Hydralazine 25mg bid and cardizem 240mg daily    #DM-II, A1c 5.7 3/26  - ISS  - fignersticks q ac and hs    #Pain  - Cymbalta 60mg daily  - Tylenol PRN  - Gabapentin 100mg BID     #BPH  - continue Flomax     #GI  - Miralax, senna    #  - bladder scan on admission and straight cath as necessary    #Diet  - Mechanical Soft  - Consistent Carb, DASH/TLC, 1500mL fluid restriction, Renal diet    DVT PPX: Heparin q 8hrs  Thank you for pleasure of consult, will follow inpatient course with you  Discussed with Rehab Attending, Dr. Carter

## 2021-04-09 NOTE — DIETITIAN INITIAL EVALUATION ADULT. - ORAL INTAKE PTA/DIET HISTORY
PTA pt states that he typically has 2 meals/day. Pt prepares his own meals. Pt typically consumes coffee and juice throughout the day. Pt states that he is taking vitamins/supplements at home, but does not recall which ones.

## 2021-04-09 NOTE — CONSULT NOTE ADULT - SUBJECTIVE AND OBJECTIVE BOX
85 y/o M with extensive PMH including HTN, HLD, CAD, HFpEF, s/p Right CEA for Carotid artery disease, ESRD on HD 2d/week (M/Fri) via LUE fistula, s/p flecainide w/ ILR placed 6/2020, AS s/p TAVR, and PAD (RLE fem-pop bypass October 2020) who presented to [facility] on 3/13/21 with GI bleed, as well as LLE pain.     Patient went for EGD; multiple gastric AVM's found and were cauterized. When patient was stable from GI, medical, cardiac standpoint, he went for LLE angiogram on 3/18, and found occlusion of L Superficial femoral artery. He was planned for a LLE fem-pop bypass, and had the bypass performed on 3/20. Patient course complicated; found to have pleural effusions and L femoral DVT. He was started on abx for presumed pna and hep gtt for DVT. Patient began to show signs of malperfusion with coolness to touch, mottling of skin to LLE. He was planned for revision of LLE bypass. Went to OR on 3/25 for LLE bypass revision with explant of PTFE bypass, CFA to AT bypass with cryovein performed. Intra-op patient with 1500 ml of blood loss requiring 8 unit PRBC, 4 unit FFP, 1 donor unit plt.   During procedure, pt lost function of PPM with period of asystole requiring transcutaneous pacing. Cardiology consulted and pacemaker interrogated in the OR.  Pt taken to PACU post op and SICU consulted. On 3/27 patient was extubated and was downgraded from SICU to floor the next day. Rest of hospital course was uneventful.   Patient remained on heparin sq while in the hospital for tx of dvt (his plavix was held). Upon discharge, approved by CHALINO and Dr. Albrecht to resume plavix. He will remain on heparin sq while at rehab for dvt tx and ppx. He is not a candidate for IVC filter.      Review of Systems: REVIEW OF SYSTEMS:    CONSTITUTIONAL: No fevers or chills  EYES/ENT: No visual changes;    NECK: No pain or stiffness  RESPIRATORY: No cough, wheezing, or shortness of breath  CARDIOVASCULAR: No chest pain or palpitations  GASTROINTESTINAL: No abdominal or epigastric pain. No nausea or vomiting. No diarrhea or constipation.   GENITOURINARY: No dysuria,   NEUROLOGICAL: No numbness, +weakness  SKIN: No itching, rashes, +incisional pain  PSYCH: +anxiety        Allergies and Intolerances:        Allergies:  	Plavix: Drug, Hives  	Toprol-XL: Drug, Rash    Home Medications:   * Patient Currently Takes Medications as of 08-Apr-2021 10:04 documented in Structured Notes  · 	Keflex 500 mg oral capsule: 1 cap(s) orally 2 times a day   · 	polyethylene glycol 3350 oral powder for reconstitution: 17 gram(s) orally once a day  · 	senna oral tablet: 2 tab(s) orally once a day (at bedtime)  · 	ocular lubricant ophthalmic solution: 1 drop(s) to each affected eye 2 times a day, As needed, Dry Eyes  · 	pantoprazole 40 mg oral delayed release tablet: 1 tab(s) orally every 12 hours  · 	dilTIAZem 240 mg/24 hours oral capsule, extended release: 1 cap(s) orally once a day  · 	heparin: 5000 unit(s) subcutaneous every 8 hours  · 	tamsulosin 0.4 mg oral capsule: 1 cap(s) orally once a day (at bedtime)  · 	acetaminophen 325 mg oral tablet: 2 tab(s) orally every 6 hours  · 	torsemide 20 mg oral tablet: 1 tab(s) orally once a day on non dialysis days  · 	atorvastatin 80 mg oral tablet: 1 tab(s) orally once a day  · 	Nephro-Thomas oral tablet: 1 tab(s) orally once a day  · 	Cymbalta 60 mg oral delayed release capsule: 1 cap(s) orally once a day  · 	hydrALAZINE 25 mg oral tablet: 2 tab(s) orally 2 times a day  · 	gabapentin 100 mg oral tablet: 1 tab(s) orally 2 times a day, As Needed  · 	Plavix 75 mg oral tablet: 1 tab(s) orally once a day    .    Patient History:    Past Medical, Past Surgical, and Family History:  PAST MEDICAL HISTORY:  Anemia   Aortic stenosis - s/p valve replacement  Arrhythmia   AV block, 1st degree   CAD (coronary artery disease)   DM (diabetes mellitus) - resolved  RICHARDS (dyspnea on exertion)   ESRD on dialysis HD on Mondays and Fridays  GI bleeding due to ulcerated polyps and colonic AVMs  HTN (hypertension)   Risk factors for obstructive sleep apnea   VT (ventricular tachycardia).     PAST SURGICAL HISTORY:  A-V fistula left arm 5/2017  H/O angioplasty 2013,  no  intervention  H/O carotid endarterectomy Right  H/O circumcision at  age  65  H/O left knee surgery   History of loop recorder   S/P TAVR (transcatheter aortic valve replacement).     FAMILY HISTORY:  Family history of premature CAD  FH: type 2 diabetes    Grandparent  Still living? Unknown  Family history of cancer, Age at diagnosis: Age Unknown  Family history of lung cancer, Age at diagnosis: Age Unknown.    SH:        Patient seen and examined at bedside.  Vital Signs Last 24 Hrs  T(F): 97.6 (08 Apr 2021 20:10), Max: 98.7 (08 Apr 2021 09:36)  HR: 97 (09 Apr 2021 05:48) (66 - 97)  BP: 164/72 (09 Apr 2021 05:48) (162/62 - 172/81)  RR: 14 (09 Apr 2021 05:48) (14 - 18)  SpO2: 97% (09 Apr 2021 05:48) (94% - 97%)  I&O's Summary    BMI (kg/m2): 27.4 (04-08-21 @ 15:41)  PHYSICAL EXAM:  General: NAD, A/O x 3  ENT: MMM, no scleral icterus  Neck: Supple, No JVD, no thyroidomegaly  Lungs: Clear to auscultation bilaterally, no wheezes, no rales, no rhonchi, good inspiratory effort  Cardio: RRR, S1/S2, No murmurs  Abdomen: Soft, Nontender, Nondistended; Bowel sounds present  Extremities: No calf tenderness, No pitting edema, no skin changes    LABS:                        9.2    9.58  )-----------( 464      ( 09 Apr 2021 06:30 )             28.4         ABG - ( 07 Apr 2021 19:53 )  pH, Arterial: 7.52  pH, Blood: x     /  pCO2: 38    /  pO2: 98    / HCO3: 32    / Base Excess: 8.0   /  SaO2: 99        POCT Blood Glucose.: 127 mg/dL (08 Apr 2021 22:12)  POCT Blood Glucose.: 108 mg/dL (08 Apr 2021 16:52)  POCT Blood Glucose.: 133 mg/dL (08 Apr 2021 12:03)  POCT Blood Glucose.: 104 mg/dL (08 Apr 2021 08:00)    COVID-19 PCR: NotDetec (04-07-21 @ 10:12)  COVID-19 PCR: NotDetec (04-03-21 @ 22:37)  COVID-19 PCR: NotDetec (03-28-21 @ 16:32)  COVID-19 PCR: NotDetec (03-24-21 @ 16:45)  COVID-19 PCR: NotDetec (03-19-21 @ 10:12)  COVID-19 PCR: NotDetec (03-16-21 @ 11:09)  COVID-19 PCR: NotDetec (03-13-21 @ 18:29)  COVID-19 PCR: NotDetec (10-21-20 @ 17:34)  COVID-19 PCR: NotDetec (09-14-20 @ 20:22)  COVID-19 PCR: NotDetec (08-28-20 @ 16:07)    RADIOLOGY & ADDITIONAL TESTS:  Xray Chest 1 View- PORTABLE-Urgent (Xray Chest 1 View- PORTABLE-Urgent .) (04.07.21 @ 19:49) >  IMPRESSION:  Congestive changes as noted.      Care Discussed with Consultants/Other Providers:      87 y/o M with extensive PMH including HTN, HLD, CAD, HFpEF, s/p Right CEA for Carotid artery disease, ESRD on HD 2d/week (M/Fri) via LUE fistula, s/p flecainide w/ ILR placed 6/2020, AS s/p TAVR, and PAD (RLE fem-pop bypass October 2020) who presented to [facility] on 3/13/21 with GI bleed, as well as LLE pain.     Patient went for EGD; multiple gastric AVM's found and were cauterized. When patient was stable from GI, medical, cardiac standpoint, he went for LLE angiogram on 3/18, and found occlusion of L Superficial femoral artery. He was planned for a LLE fem-pop bypass, and had the bypass performed on 3/20. Patient course complicated; found to have pleural effusions and L femoral DVT. He was started on abx for presumed pna and hep gtt for DVT. Patient began to show signs of malperfusion with coolness to touch, mottling of skin to LLE. He was planned for revision of LLE bypass. Went to OR on 3/25 for LLE bypass revision with explant of PTFE bypass, CFA to AT bypass with cryovein performed. Intra-op patient with 1500 ml of blood loss requiring 8 unit PRBC, 4 unit FFP, 1 donor unit plt.   During procedure, pt lost function of PPM with period of asystole requiring transcutaneous pacing. Cardiology consulted and pacemaker interrogated in the OR.  Pt taken to PACU post op and SICU consulted. On 3/27 patient was extubated and was downgraded from SICU to floor the next day. Rest of hospital course was uneventful.   Patient remained on heparin sq while in the hospital for tx of dvt (his plavix was held). Upon discharge, approved by CHALINO and Dr. Albrecht to resume plavix. He will remain on heparin sq while at rehab for dvt tx and ppx. He is not a candidate for IVC filter.      Review of Systems: REVIEW OF SYSTEMS:    CONSTITUTIONAL: No fevers or chills  EYES/ENT: No visual changes;    NECK: No pain or stiffness  RESPIRATORY: No cough, wheezing, or shortness of breath  CARDIOVASCULAR: No chest pain or palpitations  GASTROINTESTINAL: No abdominal or epigastric pain. No nausea or vomiting. No diarrhea or constipation.   GENITOURINARY: No dysuria,   NEUROLOGICAL: No numbness, +weakness  SKIN: No itching, rashes, +incisional pain  PSYCH: +anxiety        Allergies and Intolerances:        Allergies:  	Plavix: Drug, Hives  	Toprol-XL: Drug, Rash    Home Medications:   * Patient Currently Takes Medications as of 08-Apr-2021 10:04 documented in Structured Notes  · 	Keflex 500 mg oral capsule: 1 cap(s) orally 2 times a day   · 	polyethylene glycol 3350 oral powder for reconstitution: 17 gram(s) orally once a day  · 	senna oral tablet: 2 tab(s) orally once a day (at bedtime)  · 	ocular lubricant ophthalmic solution: 1 drop(s) to each affected eye 2 times a day, As needed, Dry Eyes  · 	pantoprazole 40 mg oral delayed release tablet: 1 tab(s) orally every 12 hours  · 	dilTIAZem 240 mg/24 hours oral capsule, extended release: 1 cap(s) orally once a day  · 	heparin: 5000 unit(s) subcutaneous every 8 hours  · 	tamsulosin 0.4 mg oral capsule: 1 cap(s) orally once a day (at bedtime)  · 	acetaminophen 325 mg oral tablet: 2 tab(s) orally every 6 hours  · 	torsemide 20 mg oral tablet: 1 tab(s) orally once a day on non dialysis days  · 	atorvastatin 80 mg oral tablet: 1 tab(s) orally once a day  · 	Nephro-Thomas oral tablet: 1 tab(s) orally once a day  · 	Cymbalta 60 mg oral delayed release capsule: 1 cap(s) orally once a day  · 	hydrALAZINE 25 mg oral tablet: 2 tab(s) orally 2 times a day  · 	gabapentin 100 mg oral tablet: 1 tab(s) orally 2 times a day, As Needed  · 	Plavix 75 mg oral tablet: 1 tab(s) orally once a day    .    Patient History:    Past Medical, Past Surgical, and Family History:  PAST MEDICAL HISTORY:  Anemia   Aortic stenosis - s/p valve replacement  Arrhythmia   AV block, 1st degree   CAD (coronary artery disease)   DM (diabetes mellitus) - resolved  RICHARDS (dyspnea on exertion)   ESRD on dialysis HD on Mondays and Fridays  GI bleeding due to ulcerated polyps and colonic AVMs  HTN (hypertension)   Risk factors for obstructive sleep apnea   VT (ventricular tachycardia).     PAST SURGICAL HISTORY:  A-V fistula left arm 5/2017  H/O angioplasty 2013,  no  intervention  H/O carotid endarterectomy Right  H/O circumcision at  age  65  H/O left knee surgery   History of loop recorder   S/P TAVR (transcatheter aortic valve replacement).     FAMILY HISTORY:  Family history of premature CAD  FH: type 2 diabetes    Grandparent  Still living? Unknown  Family history of cancer, Age at diagnosis: Age Unknown  Family history of lung cancer, Age at diagnosis: Age Unknown.    SH:        Patient seen and examined at bedside.  Vital Signs Last 24 Hrs  T(F): 97.6 (08 Apr 2021 20:10), Max: 98.7 (08 Apr 2021 09:36)  HR: 97 (09 Apr 2021 05:48) (66 - 97)  BP: 164/72 (09 Apr 2021 05:48) (162/62 - 172/81)  RR: 14 (09 Apr 2021 05:48) (14 - 18)  SpO2: 97% (09 Apr 2021 05:48) (94% - 97%)  I&O's Summary    BMI (kg/m2): 27.4 (04-08-21 @ 15:41)  PHYSICAL EXAM:  General: NAD, A/O x 3  ENT: MMM, no scleral icterus  Neck: Supple, No JVD, no thyroidomegaly  Lungs: Clear to auscultation bilaterally, no wheezes, no rales, no rhonchi, good inspiratory effort  Cardio: RRR, S1/S2, No murmurs  Abdomen: Soft, Nontender, Nondistended; Bowel sounds present  Extremities: No calf tenderness, No pitting edema, no skin changes    LABS:                        9.2    9.58  )-----------( 464      ( 09 Apr 2021 06:30 )             28.4         ABG - ( 07 Apr 2021 19:53 )  pH, Arterial: 7.52  pH, Blood: x     /  pCO2: 38    /  pO2: 98    / HCO3: 32    / Base Excess: 8.0   /  SaO2: 99        POCT Blood Glucose.: 127 mg/dL (08 Apr 2021 22:12)  POCT Blood Glucose.: 108 mg/dL (08 Apr 2021 16:52)  POCT Blood Glucose.: 133 mg/dL (08 Apr 2021 12:03)  POCT Blood Glucose.: 104 mg/dL (08 Apr 2021 08:00)    COVID-19 PCR: NotDetec (04-07-21 @ 10:12)  COVID-19 PCR: NotDetec (04-03-21 @ 22:37)  COVID-19 PCR: NotDetec (03-28-21 @ 16:32)  COVID-19 PCR: NotDetec (03-24-21 @ 16:45)  COVID-19 PCR: NotDetec (03-19-21 @ 10:12)  COVID-19 PCR: NotDetec (03-16-21 @ 11:09)  COVID-19 PCR: NotDetec (03-13-21 @ 18:29)  COVID-19 PCR: NotDetec (10-21-20 @ 17:34)  COVID-19 PCR: NotDetec (09-14-20 @ 20:22)  COVID-19 PCR: NotDetec (08-28-20 @ 16:07)    RADIOLOGY & ADDITIONAL TESTS:  Xray Chest 1 View- PORTABLE-Urgent (Xray Chest 1 View- PORTABLE-Urgent .) (04.07.21 @ 19:49) >  IMPRESSION:  Congestive changes as noted.       87 y/o M with extensive PMH including HTN, HLD, CAD, HFpEF, s/p Right CEA for Carotid artery disease, ESRD on HD 2d/week (M/Fri) via LUE fistula, s/p flecainide w/ ILR placed 6/2020, AS s/p TAVR, and PAD (RLE fem-pop bypass October 2020) who presented to [facility] on 3/13/21 with GI bleed, as well as LLE pain.     Patient went for EGD; multiple gastric AVM's found and were cauterized. When patient was stable from GI, medical, cardiac standpoint, he went for LLE angiogram on 3/18, and found occlusion of L Superficial femoral artery. He was planned for a LLE fem-pop bypass, and had the bypass performed on 3/20. Patient course complicated; found to have pleural effusions and L femoral DVT. He was started on abx for presumed pna and hep gtt for DVT. Patient began to show signs of malperfusion with coolness to touch, mottling of skin to LLE. He was planned for revision of LLE bypass. Went to OR on 3/25 for LLE bypass revision with explant of PTFE bypass, CFA to AT bypass with cryovein performed. Intra-op patient with 1500 ml of blood loss requiring 8 unit PRBC, 4 unit FFP, 1 donor unit plt.   During procedure, pt lost function of PPM with period of asystole requiring transcutaneous pacing. Cardiology consulted and pacemaker interrogated in the OR.  Pt taken to PACU post op and SICU consulted. On 3/27 patient was extubated and was downgraded from SICU to floor the next day. Rest of hospital course was uneventful.   Patient remained on heparin sq while in the hospital for tx of dvt (his plavix was held). Upon discharge, approved by CHALINO and Dr. Albrecht to resume plavix. He will remain on heparin sq while at rehab for dvt tx and ppx. He is not a candidate for IVC filter.      Review of Systems: REVIEW OF SYSTEMS:    CONSTITUTIONAL: No fevers or chills  EYES/ENT: No visual changes;    NECK: No pain or stiffness  RESPIRATORY: No cough, wheezing, or shortness of breath  CARDIOVASCULAR: No chest pain or palpitations  GASTROINTESTINAL: No abdominal or epigastric pain. No nausea or vomiting. No diarrhea or constipation.   GENITOURINARY: No dysuria,   NEUROLOGICAL: No numbness, +weakness  SKIN: No itching, rashes, +incisional pain  PSYCH: +anxiety        Allergies and Intolerances:        Allergies:  	Plavix: Drug, Hives  	Toprol-XL: Drug, Rash    Home Medications:   * Patient Currently Takes Medications as of 08-Apr-2021 10:04 documented in Structured Notes  · 	Keflex 500 mg oral capsule: 1 cap(s) orally 2 times a day   · 	polyethylene glycol 3350 oral powder for reconstitution: 17 gram(s) orally once a day  · 	senna oral tablet: 2 tab(s) orally once a day (at bedtime)  · 	ocular lubricant ophthalmic solution: 1 drop(s) to each affected eye 2 times a day, As needed, Dry Eyes  · 	pantoprazole 40 mg oral delayed release tablet: 1 tab(s) orally every 12 hours  · 	dilTIAZem 240 mg/24 hours oral capsule, extended release: 1 cap(s) orally once a day  · 	heparin: 5000 unit(s) subcutaneous every 8 hours  · 	tamsulosin 0.4 mg oral capsule: 1 cap(s) orally once a day (at bedtime)  · 	acetaminophen 325 mg oral tablet: 2 tab(s) orally every 6 hours  · 	torsemide 20 mg oral tablet: 1 tab(s) orally once a day on non dialysis days  · 	atorvastatin 80 mg oral tablet: 1 tab(s) orally once a day  · 	Nephro-Thomas oral tablet: 1 tab(s) orally once a day  · 	Cymbalta 60 mg oral delayed release capsule: 1 cap(s) orally once a day  · 	hydrALAZINE 25 mg oral tablet: 2 tab(s) orally 2 times a day  · 	gabapentin 100 mg oral tablet: 1 tab(s) orally 2 times a day, As Needed  · 	Plavix 75 mg oral tablet: 1 tab(s) orally once a day    .    Patient History:    Past Medical, Past Surgical, and Family History:  PAST MEDICAL HISTORY:  Anemia   Aortic stenosis - s/p valve replacement  Arrhythmia   AV block, 1st degree   CAD (coronary artery disease)   DM (diabetes mellitus) - resolved  RICHARDS (dyspnea on exertion)   ESRD on dialysis HD on Mondays and Fridays  GI bleeding due to ulcerated polyps and colonic AVMs  HTN (hypertension)   Risk factors for obstructive sleep apnea   VT (ventricular tachycardia).     PAST SURGICAL HISTORY:  A-V fistula left arm 5/2017  H/O angioplasty 2013,  no  intervention  H/O carotid endarterectomy Right  H/O circumcision at  age  65  H/O left knee surgery   History of loop recorder   S/P TAVR (transcatheter aortic valve replacement).     FAMILY HISTORY:  Family history of premature CAD  FH: type 2 diabetes    Grandparent  Still living? Unknown  Family history of cancer, Age at diagnosis: Age Unknown  Family history of lung cancer, Age at diagnosis: Age Unknown.    Patient seen and examined at bedside.  Patient states that he did not sleep last night  Denies chest pain, SOB  Last BM yesterday, tolerating diet      Vital Signs Last 24 Hrs  T(F): 97.6 (08 Apr 2021 20:10), Max: 98.7 (08 Apr 2021 09:36)  HR: 97 (09 Apr 2021 05:48) (66 - 97)  BP: 164/72 (09 Apr 2021 05:48) (162/62 - 172/81)  RR: 14 (09 Apr 2021 05:48) (14 - 18)  SpO2: 97% (09 Apr 2021 05:48) (94% - 97%)  I&O's Summary    BMI (kg/m2): 27.4 (04-08-21 @ 15:41)  PHYSICAL EXAM:  General: NAD, A/O x 3, tired, pleasant, speaking in short sentences, follows commands  ENT: MMM, no scleral icterus  Neck: Supple, No JVD, no thyroidomegaly  Lungs: Clear to auscultation bilaterally, no wheezes, no rales, no rhonchi, good inspiratory effort  Cardio: RRR, S1/S2, No murmurs  Abdomen: Soft, Nontender, Nondistended; Bowel sounds present  Extremities: LLE with 2 incision sites, lateral and medial calf. Both sites with bandage with mild discharge, no juan bleeding. LLE tender to palpation at incision site, +edema. RLE +edema (previous bypass). 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, left heel with area of DTI- small pinpoint, right heel with area of DTI,   Neuro: CN II-XII intact    LABS:                        9.2    9.58  )-----------( 464      ( 09 Apr 2021 06:30 )             28.4         ABG - ( 07 Apr 2021 19:53 )  pH, Arterial: 7.52  pH, Blood: x     /  pCO2: 38    /  pO2: 98    / HCO3: 32    / Base Excess: 8.0   /  SaO2: 99        POCT Blood Glucose.: 127 mg/dL (08 Apr 2021 22:12)  POCT Blood Glucose.: 108 mg/dL (08 Apr 2021 16:52)  POCT Blood Glucose.: 133 mg/dL (08 Apr 2021 12:03)  POCT Blood Glucose.: 104 mg/dL (08 Apr 2021 08:00)    COVID-19 PCR: NotDetec (04-07-21 @ 10:12)  COVID-19 PCR: NotDetec (04-03-21 @ 22:37)  COVID-19 PCR: NotDetec (03-28-21 @ 16:32)  COVID-19 PCR: NotDetec (03-24-21 @ 16:45)  COVID-19 PCR: NotDetec (03-19-21 @ 10:12)  COVID-19 PCR: NotDetec (03-16-21 @ 11:09)  COVID-19 PCR: NotDetec (03-13-21 @ 18:29)  COVID-19 PCR: NotDetec (10-21-20 @ 17:34)  COVID-19 PCR: NotDetec (09-14-20 @ 20:22)  COVID-19 PCR: NotDetec (08-28-20 @ 16:07)    RADIOLOGY & ADDITIONAL TESTS:  Xray Chest 1 View- PORTABLE-Urgent (Xray Chest 1 View- PORTABLE-Urgent .) (04.07.21 @ 19:49) >  IMPRESSION:  Congestive changes as noted.

## 2021-04-09 NOTE — DIETITIAN INITIAL EVALUATION ADULT. - ENERGY INTAKE
No PO intake documented thus far No PO intake documented thus far. PTA, poor PO intake documented at [facility]

## 2021-04-09 NOTE — DIETITIAN INITIAL EVALUATION ADULT. - [id number]_GEN_A_NUR
Education not appropriate at this time Education not appropriate at this time. Patient seemed confused.

## 2021-04-09 NOTE — DIETITIAN NUTRITION RISK NOTIFICATION - TREATMENT: THE FOLLOWING DIET HAS BEEN RECOMMENDED
Diet, Dysphagia 3 Soft-Thin Liquids:   Consistent Carbohydrate {No Snacks}  DASH/TLC {Sodium & Cholesterol Restricted}  1500mL Fluid Restriction ([id number])  For patients receiving Renal Replacement - No Protein Restr, No Conc K, No Conc Phos, Low Sodium  Supplement Feeding Modality:  Oral  Nepro Cans or Servings Per Day:  1       Frequency:  Three Times a day (04-09-21 @ 15:23) [Active]

## 2021-04-09 NOTE — PROGRESS NOTE ADULT - ASSESSMENT
Pt is a 87 y/o M with PMHx of HTN, HLD, CAD, HFpEF, s/p Right CEA for Carotid artery disease, ESRD on HD 2d/week (M/Fri) via LUE fistula, s/p flecainide w/ ILR placed 6/2020, AS s/p TAVR, and PAD who presented to [facility] on 3/13/21 with GI bleed, as well as LLE pain, found to have occlusion of left superficial femoral artery. Pt underwent EGD and AVM cauterizations. He also underwent left fem-pop bypass x2 and was found to have LLE DVT, treated with heparin. He has functional deficits of with his ambulation and his endurance.      #PAD  - s/p fem-pop bypass and revision  - Keflex 500mg bid for 2 days to complete 5 day course for skin erythema around his incision  - Pain control  - cleared to resume Plavix 75mg  - PT/OT/ST    #DVT  - left femoral vein DVT  - on Heparin tid  - Per chart - patient not a candidate for IVC filter    #UGIB  - egd showed multiple AVMs, cauterized during EGD  - Protonix bid    #CKD  - on M/F dialysis with extra dialysis intermittently  - Renal consult  - Epoetin Yanick with dialysis  - Pt will need earlier dialysis today and possibly during the weekend as well    #CHF  - continue Toresemide 20mg 6 days a week  - continue Cardizem 240mg daily    #HTN  - continue Hydralazine 25mg bid    #DM  - ISS  - fingersticks    #Pain  - Cymbalta 60mg daily  - Tylenol PRN    #BPH  - continue Flomax     #GI  - Miralax, senna    #  - bladder scan on admission and straight cath as necessary    #Skin  - Bilateral heel pressure ulcers: off-load heels  - Coccyx decubitus ulcer: was unstageable and is healing  - Daily dressing changes of left calf incisions  - staples present at groin and lateral thigh    #Diet  - Mechanical Soft  - Consistent Carb, DASH/TLC, 1500mL fluid restriction, Renal diet Pt is a 87 y/o M with PMHx of HTN, HLD, CAD, HFpEF, s/p Right CEA for Carotid artery disease, ESRD on HD 2d/week (M/Fri) via LUE fistula, s/p flecainide w/ ILR placed 6/2020, AS s/p TAVR, and PAD who presented to [facility] on 3/13/21 with GI bleed, as well as LLE pain, found to have occlusion of left superficial femoral artery. Pt underwent EGD and AVM cauterizations. He also underwent left fem-pop bypass x2 and was found to have LLE DVT, treated with heparin. He has functional deficits of with his ambulation and his endurance.      #PAD  - s/p fem-pop bypass and revision  - Keflex 500mg bid for 2 days to complete 5 day course for skin erythema around his incision  - Pain control  - cleared to resume Plavix 75mg  - PT/OT/ST    #DVT  - left femoral vein DVT  - on Heparin tid  - Per chart - patient not a candidate for IVC filter    #UGIB  - egd showed multiple AVMs, cauterized during EGD  - Protonix bid    #CKD  - on M/F dialysis with extra dialysis intermittently  - Renal consult  - Epoetin Yanick with dialysis  - Pt will need earlier dialysis today and possibly during the weekend as well    #CHF  - continue Toresemide 20mg 6 days a week  - continue Cardizem 240mg daily    #HTN  - continue Hydralazine 25mg bid    #DM  - ISS  - fingersticks    #Pain  - Cymbalta 60mg daily  - Tylenol PRN    #BPH  - continue Flomax     #GI  - Miralax, senna    #  - bladder scan on admission and straight cath as necessary    #Skin  - Bilateral heel pressure ulcers DTI: off-load heels  - Sacrum and buttock decubitus ulcer: was unstageable, starting to healing  - Daily dressing changes of left calf incisions  - staples present at groin and lateral thigh    #Diet  - Mechanical Soft  - Consistent Carb, DASH/TLC, 1500mL fluid restriction, Renal diet Pt is a 87 y/o M with PMHx of HTN, HLD, CAD, HFpEF, s/p Right CEA for Carotid artery disease, ESRD on HD 2d/week (M/Fri) via LUE fistula, s/p flecainide w/ ILR placed 6/2020, AS s/p TAVR, and PAD who presented to [facility] on 3/13/21 with GI bleed, as well as LLE pain, found to have occlusion of left superficial femoral artery. Pt underwent EGD and AVM cauterizations. He also underwent left fem-pop bypass x2 and was found to have LLE DVT, treated with heparin. He has functional deficits of with his ambulation and his endurance.      #PAD  - s/p fem-pop bypass and revision  - Keflex 500mg bid for 2 days to complete 5 day course for skin erythema around his incision  - Pain control  - cleared to resume Plavix 75mg  - PT/OT/ST-total of 3 hrs/day 5 days/week     #DVT  - left femoral vein DVT  - on Heparin sq  tid  - Per chart - patient not a candidate for IVC filter    #UGIB  - egd showed multiple AVMs, cauterized during EGD  - Protonix bid    #CKD  - on M/F dialysis with extra dialysis intermittently  - Renal consult  - Epoetin Yanick with dialysis  - Pt will need earlier dialysis today and possibly during the weekend as well    #CHF  - continue Toresemide 20mg 6 days a week  - continue Cardizem 240mg daily    #HTN  - continue Hydralazine 25mg bid    #DM  - ISS  - fingersticks    #Pain  - Cymbalta 60mg daily  - Tylenol PRN    #BPH  - continue Flomax     #GI  - Miralax, senna    #:d/c scan    #Skin  - Bilateral heel pressure ulcers DTI: off-load heels  - Sacrum and buttock decubitus ulcer: was unstageable, starting to healing  - Daily dressing changes of left calf incisions  - staples present at groin and lateral thigh    #Diet  - Mechanical Soft  - Consistent Carb, DASH/TLC, 1500mL fluid restriction, Renal diet Pt is a 85 y/o M with PMHx of HTN, HLD, CAD, HFpEF, s/p Right CEA for Carotid artery disease, ESRD on HD 2d/week (M/Fri) via LUE fistula, s/p flecainide w/ ILR placed 6/2020, AS s/p TAVR, and PAD who presented to [facility] on 3/13/21 with GI bleed, as well as LLE pain, found to have occlusion of left superficial femoral artery. Pt underwent EGD and AVM cauterizations. He also underwent left fem-pop bypass x2 and was found to have LLE DVT, treated with heparin. He has functional deficits of with his ambulation and his endurance.      #PAD  - s/p fem-pop bypass and revision  - Keflex 500mg bid for 2 days to complete 5 day course for skin erythema around his incision  - Pain control  - cleared to resume Plavix 75mg  - PT/OT/ST-total of 3 hrs/day 5 days/week     #DVT  - left femoral vein DVT  - on Heparin sq  tid  - Per chart - patient not a candidate for IVC filter    #UGIB  - egd showed multiple AVMs, cauterized during EGD  - Protonix bid    #CKD  - on M/F dialysis with extra dialysis intermittently  - Renal consult  - Epoetin Yanick with dialysis  - Pt will need earlier dialysis today and possibly during the weekend as well    #CHF  - continue Toresemide 20mg 6 days a week  - continue Cardizem 240mg daily    #HTN  - continue Hydralazine 25mg bid    #DM  - ISS  - fingersticks    #Pain  - Cymbalta 60mg daily  - Tylenol PRN    #BPH  - continue Flomax     #GI  - Miralax, senna    #:d/c scan    #Skin  - Bilateral heel pressure ulcers DTI: off-load heels  - Sacrum and buttock decubitus ulcers: were unstageable, starting to heal  - Daily dressing changes of left calf incisions  - staples present at groin and lateral thigh    #Diet  - Mechanical Soft  - Consistent Carb, DASH/TLC, 1500mL fluid restriction, Renal diet

## 2021-04-09 NOTE — DIETITIAN INITIAL EVALUATION ADULT. - OTHER INFO
As per H&P, Pt is a 87 y/o M with PMHx of HTN, HLD, CAD, HFpEF, s/p Right CEA for Carotid artery disease, ESRD on HD 2d/week (M/Fri) via LUE fistula, s/p flecainide w/ ILR placed 6/2020, AS s/p TAVR, and PAD who presented to [facility] on 3/13/21 with GI bleed, as well as LLE pain, found to have occlusion of left superficial femoral artery. He has functional deficits of with his ambulation and his endurance. Per attending, he is medically stable for discharge to [facility] and on 4/8/21 he was transferred to [facility] for acute inpatient rehabilitation.  Pt reports of good appetite. Pt states that he consumed 50% of his breakfast this morning, unsure of the accuracy. Pt consumed some of his Nepro supplement. No food allergies. Pt states that he wears dentures. Pt states that he experiences occasional constipation. Denies N/V/D. Last BM 4/9 as per patient.  Skin: surgical incision; left calf, shin, thigh, and groin. Pt has two unstageable pressure injuries; coccyx and left heal. Pt not interested in Bruce supplement for wound healing at this time. Encouraged PO intake of meals and Nepro supplements in order to meet his estimated nutritional needs. As per H&P, Pt is a 85 y/o M with PMHx of HTN, HLD, CAD, HFpEF, s/p Right CEA for Carotid artery disease, ESRD on HD 2d/week (M/Fri) via LUE fistula, s/p flecainide w/ ILR placed 6/2020, AS s/p TAVR, and PAD who presented to [facility] on 3/13/21 with GI bleed, as well as LLE pain, found to have occlusion of left superficial femoral artery. He has functional deficits of with his ambulation and his endurance. Per attending, he is medically stable for discharge to [facility] and on 4/8/21 he was transferred to [facility] for acute inpatient rehabilitation.  Pt reports of good appetite. Pt states that he consumed 50% of his breakfast this morning, unsure of the accuracy. Poor PO intake documented at previous hospital stay. Pt consumed ~50% of his Nepro supplement. No food allergies. Pt states that he wears dentures. Pt states that he experiences occasional constipation. Denies N/V/D. Last BM 4/8 as per documentation. Last BM 4/9 as per patient. UBW: ~157#. Current weight: 159#. Generalized +2 edema as per flowsheet. Skin: surgical incision; left calf, shin, thigh, and groin. Pt has two unstageable pressure injuries; coccyx and left heal. Pt not interested in Bruce supplement for wound healing at this time. Encouraged PO intake of meals and Nepro supplements in order to meet his estimated nutritional needs. Explained to the pt the importance of protein for wound healing. Nutrition focused physical exam completed. Muscle depletion and fat loss noted.

## 2021-04-09 NOTE — DIETITIAN NUTRITION RISK NOTIFICATION - ADDITIONAL COMMENTS/DIETITIAN RECOMMENDATIONS
1. Continue current diet as ordered. Honor pt's preferences as feasible w/ current diet order.   2. Continue Nepro 8 fl oz supplement TID (1,260 kcal, 57 g protein).

## 2021-04-09 NOTE — DIETITIAN INITIAL EVALUATION ADULT. - ADD RECOMMEND
1. Continue current diet as ordered. Honor pt's preferences as feasible w/ current diet order. 2. Continue Nepro 8 fl oz supplement TID (1,260 kcal, 57 g protein) 1. Continue current diet as ordered. Honor pt's preferences as feasible w/ current diet order. 2. Continue Nepro 8 fl oz supplement TID (1,260 kcal, 57 g protein).

## 2021-04-10 LAB
GLUCOSE BLDC GLUCOMTR-MCNC: 124 MG/DL — HIGH (ref 70–99)
GLUCOSE BLDC GLUCOMTR-MCNC: 147 MG/DL — HIGH (ref 70–99)
GLUCOSE BLDC GLUCOMTR-MCNC: 172 MG/DL — HIGH (ref 70–99)
GLUCOSE BLDC GLUCOMTR-MCNC: 173 MG/DL — HIGH (ref 70–99)
HBV SURFACE AB SER-ACNC: 78.7 MIU/ML — SIGNIFICANT CHANGE UP
HBV SURFACE AG SER-ACNC: SIGNIFICANT CHANGE UP
HCV AB S/CO SERPL IA: 0.15 S/CO — SIGNIFICANT CHANGE UP (ref 0–0.99)
HCV AB SERPL-IMP: SIGNIFICANT CHANGE UP

## 2021-04-10 PROCEDURE — 99233 SBSQ HOSP IP/OBS HIGH 50: CPT

## 2021-04-10 PROCEDURE — 99232 SBSQ HOSP IP/OBS MODERATE 35: CPT

## 2021-04-10 RX ADMIN — HEPARIN SODIUM 5000 UNIT(S): 5000 INJECTION INTRAVENOUS; SUBCUTANEOUS at 13:23

## 2021-04-10 RX ADMIN — Medication 25 MILLIGRAM(S): at 17:34

## 2021-04-10 RX ADMIN — Medication 1 TABLET(S): at 12:03

## 2021-04-10 RX ADMIN — Medication 25 MILLIGRAM(S): at 05:33

## 2021-04-10 RX ADMIN — POLYETHYLENE GLYCOL 3350 17 GRAM(S): 17 POWDER, FOR SOLUTION ORAL at 12:03

## 2021-04-10 RX ADMIN — Medication 650 MILLIGRAM(S): at 23:15

## 2021-04-10 RX ADMIN — PANTOPRAZOLE SODIUM 40 MILLIGRAM(S): 20 TABLET, DELAYED RELEASE ORAL at 17:34

## 2021-04-10 RX ADMIN — Medication 650 MILLIGRAM(S): at 23:02

## 2021-04-10 RX ADMIN — DULOXETINE HYDROCHLORIDE 60 MILLIGRAM(S): 30 CAPSULE, DELAYED RELEASE ORAL at 12:03

## 2021-04-10 RX ADMIN — Medication 6 MILLIGRAM(S): at 23:02

## 2021-04-10 RX ADMIN — Medication 240 MILLIGRAM(S): at 05:33

## 2021-04-10 RX ADMIN — SENNA PLUS 2 TABLET(S): 8.6 TABLET ORAL at 23:03

## 2021-04-10 RX ADMIN — CLOPIDOGREL BISULFATE 75 MILLIGRAM(S): 75 TABLET, FILM COATED ORAL at 12:03

## 2021-04-10 RX ADMIN — PANTOPRAZOLE SODIUM 40 MILLIGRAM(S): 20 TABLET, DELAYED RELEASE ORAL at 05:33

## 2021-04-10 RX ADMIN — HEPARIN SODIUM 5000 UNIT(S): 5000 INJECTION INTRAVENOUS; SUBCUTANEOUS at 05:33

## 2021-04-10 RX ADMIN — TAMSULOSIN HYDROCHLORIDE 0.4 MILLIGRAM(S): 0.4 CAPSULE ORAL at 23:03

## 2021-04-10 RX ADMIN — Medication 500 MILLIGRAM(S): at 05:34

## 2021-04-10 RX ADMIN — HEPARIN SODIUM 5000 UNIT(S): 5000 INJECTION INTRAVENOUS; SUBCUTANEOUS at 23:03

## 2021-04-10 NOTE — PROGRESS NOTE ADULT - SUBJECTIVE AND OBJECTIVE BOX
Patient is a 86y old  Male who presents with a chief complaint of Impairment group -13 - PAD (09 Apr 2021 18:29)      SUBJECTIVE / OVERNIGHT EVENTS:  Pt seen and examined at bedside. No acute events overnight.  Pt denies cp, palpitations, sob, abd pain, N/V, fever, chills.    ROS:  All other review of systems negative    Allergies    Plavix (Hives)  Toprol-XL (Rash)    Intolerances        MEDICATIONS  (STANDING):  clopidogrel Tablet 75 milliGRAM(s) Oral daily  dextrose 40% Gel 15 Gram(s) Oral once  dextrose 5%. 1000 milliLiter(s) (50 mL/Hr) IV Continuous <Continuous>  dextrose 5%. 1000 milliLiter(s) (100 mL/Hr) IV Continuous <Continuous>  dextrose 50% Injectable 25 Gram(s) IV Push once  dextrose 50% Injectable 12.5 Gram(s) IV Push once  dextrose 50% Injectable 25 Gram(s) IV Push once  diltiazem    milliGRAM(s) Oral daily  DULoxetine 60 milliGRAM(s) Oral daily  epoetin juan-epbx (RETACRIT) Injectable 07210 Unit(s) IV Push <User Schedule>  glucagon  Injectable 1 milliGRAM(s) IntraMuscular once  heparin   Injectable 5000 Unit(s) SubCutaneous every 8 hours  hydrALAZINE 25 milliGRAM(s) Oral two times a day  insulin lispro (ADMELOG) corrective regimen sliding scale   SubCutaneous three times a day before meals  insulin lispro (ADMELOG) corrective regimen sliding scale   SubCutaneous at bedtime  melatonin 6 milliGRAM(s) Oral at bedtime  Nephro-pito 1 Tablet(s) Oral daily  pantoprazole    Tablet 40 milliGRAM(s) Oral two times a day  polyethylene glycol 3350 17 Gram(s) Oral daily  senna 2 Tablet(s) Oral at bedtime  tamsulosin 0.4 milliGRAM(s) Oral at bedtime    MEDICATIONS  (PRN):  acetaminophen   Tablet .. 650 milliGRAM(s) Oral every 6 hours PRN Temp greater or equal to 38C (100.4F), Mild Pain (1 - 3)  artificial  tears Solution 1 Drop(s) Both EYES two times a day PRN Dry Eyes      Vital Signs Last 24 Hrs  T(C): 36.6 (10 Apr 2021 08:27), Max: 36.9 (09 Apr 2021 17:30)  T(F): 97.9 (10 Apr 2021 08:27), Max: 98.4 (09 Apr 2021 17:30)  HR: 70 (10 Apr 2021 08:27) (57 - 74)  BP: 173/- (10 Apr 2021 08:27) (154/54 - 184/69)  BP(mean): --  RR: 17 (10 Apr 2021 08:27) (16 - 17)  SpO2: 97% (10 Apr 2021 08:27) (94% - 100%)  CAPILLARY BLOOD GLUCOSE      POCT Blood Glucose.: 147 mg/dL (10 Apr 2021 07:59)  POCT Blood Glucose.: 142 mg/dL (09 Apr 2021 21:23)  POCT Blood Glucose.: 107 mg/dL (09 Apr 2021 17:53)  POCT Blood Glucose.: 132 mg/dL (09 Apr 2021 12:28)    I&O's Summary    09 Apr 2021 07:01  -  10 Apr 2021 07:00  --------------------------------------------------------  IN: 800 mL / OUT: 3800 mL / NET: -3000 mL        PHYSICAL EXAM:  GENERAL: NAD, Elderly male  HEAD:  Atraumatic, Normocephalic  EYES: EOMI, PERRLA, conjunctiva and sclera clear  CHEST/LUNG: Clear to auscultation bilaterally; No wheeze, nonlabored breathing  HEART: Regular rate and rhythm; No murmurs, rubs, or gallops  ABDOMEN: Soft, Nontender, Nondistended; Bowel sounds present  EXTREMITIES:  2+ Peripheral Pulses, No clubbing, cyanosis. B/L LE 2+ Pitting edema  NEUROLOGY: AAOx3, non-focal  PSYCH: calm, appropriate mood  SKIN: LUE AVF, LLE Surgical site c/d/i     LABS:                        9.2    9.58  )-----------( 464      ( 09 Apr 2021 06:30 )             28.4     04-09    139  |  103  |  45<H>  ----------------------------<  130<H>  3.7   |  27  |  4.30<H>    Ca    8.6      09 Apr 2021 06:30                RADIOLOGY & ADDITIONAL TESTS:  Results Reviewed:   Imaging Personally Reviewed:  Electrocardiogram Personally Reviewed:    COORDINATION OF CARE:  Care Discussed with Consultants/Other Providers [Y/N]:  Prior or Outpatient Records Reviewed [Y/N]: normal

## 2021-04-10 NOTE — PROGRESS NOTE ADULT - SUBJECTIVE AND OBJECTIVE BOX
HPI:  Pt is a 87 y/o M with PMHx of HTN, HLD, CAD, HFpEF, s/p Right CEA for Carotid artery disease, ESRD on HD 2d/week (M/Fri) via LUE fistula, s/p flecainide w/ ILR placed 6/2020, AS s/p TAVR, and PAD (RLE fem-pop bypass October 2020) who presented to [facility] on 3/13/21 with GI bleed, as well as LLE pain. 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 When patient was stable from GI, medical, cardiac standpoint, he went for LLE angiogram on 3/18, and found occlusion of L Superficial femoral artery. He was planned for a LLE fem-pop bypass, and had the bypass performed on 3/20. On 3/22, patient was a RRT for AMS, fever. Work-up revealed pleural effusions and L femoral DVT. He was started on abx for presumed pna and hep gtt for DVT. Patient began to show signs of malperfusion with coolness to touch, mottling of skin to LLE. He was planned for revision of LLE bypass. Went to OR on 3/25 for LLE bypass revision with explant of PTFE bypass, CFA to AT bypass with cryovein performed. Intra-op patient with 1500 ml of blood loss requiring 8 unit PRBC, 4 unit FFP, 1 donor unit plt.  During procedure, pt lost function of PPM with period of asystole requiring transcutaneous pacing. Cardiology consulted and pacemaker interrogated in the OR.  Pt taken to PACU post op and SICU consulted. Patient went to SICU post-op. On 3/27 patient was extubated and was downgraded from SICU to floor the next day. Rest of hospital course was uneventful. He worked with PT, was evaluated by PM&R and approved for acute rehab. The patient remained on heparin sq while in the hospital for tx of dvt (his plavix was held). Upon discharge, approved by CHALINO and Dr. Albrecht to resume plavix. He will remain on heparin sq while at rehab for dvt tx and ppx. He is not a candidate for IVC filter. Upon discharge, pain is well controlled, tolerating diet, remains HD stable, no clinical signs/symptoms of GI bleed, LLE remains warm and well perfused, he has a strong LLE palpable graft pulse and dopplerable signals. Per attending, he is medically stable for discharge to [facility] and on 4/8/21 he was transferred to [facility] for acute inpatient rehabilitation.    SUBJECTIVE / INTERVAL HPI: Patient seen and examined at bedside.   Per nursing staff, much improved overnight  Had HD yesterday and appears more comfortable  Per CNA - appetite somewhat better   When seen later in morning, patient seated in WC  RN placed patient on o2 for comfort      REVIEW OF SYSTEMS:    CONSTITUTIONAL: No fevers or chills  EYES/ENT: No visual changes;    NECK: No pain or stiffness  RESPIRATORY: No cough, +mild shortness of breath  CARDIOVASCULAR: No chest pain or palpitations  GASTROINTESTINAL: No abdominal or epigastric pain.   GENITOURINARY: No dysuria,  NEUROLOGICAL: No numbness, +weakness  SKIN: No itching, rashes, +left groin and leg incision sites, +heel wounds, +sacral wounds      VITAL SIGNS:  Vital Signs Last 24 Hrs  T(C): 37.1 (09 Apr 2021 08:28), Max: 37.1 (09 Apr 2021 08:28)  T(F): 98.7 (09 Apr 2021 08:28), Max: 98.7 (09 Apr 2021 08:28)  HR: 80 (09 Apr 2021 08:28) (66 - 97)  BP: 160/72 (09 Apr 2021 08:28) (160/72 - 172/81)  BP(mean): --  RR: 17 (09 Apr 2021 08:28) (14 - 18)  SpO2: 95% (09 Apr 2021 08:28) (94% - 97%)    PHYSICAL EXAM:    General: NAD, sitting up in bed  Cardiovascular: +S1/S2,   Respiratory:  mild crackles at bases  Gastrointestinal: soft, NT/ND  Extremities: LUE with AV fistula, bilateral lower extremity edema with mild tenderness to touch. Feet warm to touch  Skin: Decubitus bilateral heel pressure ulcers DTI, Left groin and lateral thigh with staples. Left calf incision lateral and medial with dressing present. Gangrenous toes on left foot. Decubitus sacral and buttock ulcers present    MEDICATIONS  (STANDING):  clopidogrel Tablet 75 milliGRAM(s) Oral daily  dextrose 40% Gel 15 Gram(s) Oral once  dextrose 5%. 1000 milliLiter(s) (50 mL/Hr) IV Continuous <Continuous>  dextrose 5%. 1000 milliLiter(s) (100 mL/Hr) IV Continuous <Continuous>  dextrose 50% Injectable 25 Gram(s) IV Push once  dextrose 50% Injectable 12.5 Gram(s) IV Push once  dextrose 50% Injectable 25 Gram(s) IV Push once  diltiazem    milliGRAM(s) Oral daily  DULoxetine 60 milliGRAM(s) Oral daily  epoetin juan-epbx (RETACRIT) Injectable 03506 Unit(s) IV Push <User Schedule>  glucagon  Injectable 1 milliGRAM(s) IntraMuscular once  heparin   Injectable 5000 Unit(s) SubCutaneous every 8 hours  hydrALAZINE 25 milliGRAM(s) Oral two times a day  insulin lispro (ADMELOG) corrective regimen sliding scale   SubCutaneous three times a day before meals  insulin lispro (ADMELOG) corrective regimen sliding scale   SubCutaneous at bedtime  melatonin 6 milliGRAM(s) Oral at bedtime  Nephro-pito 1 Tablet(s) Oral daily  pantoprazole    Tablet 40 milliGRAM(s) Oral two times a day  polyethylene glycol 3350 17 Gram(s) Oral daily  senna 2 Tablet(s) Oral at bedtime  tamsulosin 0.4 milliGRAM(s) Oral at bedtime    MEDICATIONS  (PRN):  acetaminophen   Tablet .. 650 milliGRAM(s) Oral every 6 hours PRN Temp greater or equal to 38C (100.4F), Mild Pain (1 - 3)  artificial  tears Solution 1 Drop(s) Both EYES two times a day PRN Dry Eyes    LABS:                                             9.2    9.58  )-----------( 464      ( 09 Apr 2021 06:30 )             28.4     04-09    139  |  103  |  45<H>  ----------------------------<  130<H>  3.7   |  27  |  4.30<H>    Ca    8.6      09 Apr 2021 06:30      CAPILLARY BLOOD GLUCOSE      POCT Blood Glucose.: 173 mg/dL (10 Apr 2021 11:42)  POCT Blood Glucose.: 147 mg/dL (10 Apr 2021 07:59)  POCT Blood Glucose.: 142 mg/dL (09 Apr 2021 21:23)  POCT Blood Glucose.: 107 mg/dL (09 Apr 2021 17:53)

## 2021-04-10 NOTE — PROGRESS NOTE ADULT - ASSESSMENT
85 y/o M with extensive PMH including HTN, HLD, CAD, HFpEF, s/p Right CEA for Carotid artery disease, ESRD on HD 2d/week (M/Fri) via LUE fistula, s/p flecainide w/ ILR placed 6/2020, AS s/p TAVR, and PAD (RLE fem-pop bypass October 2020) who presented to [facility] on 3/13/21 with GI bleed, as well as LLE pain due to occulsion of L superficial fem artery.  He had a complicated hospital course and also had acute DVT, PNA, bilateral pleural effusions.  Pt apparently was not a candidate for an IVC filter.  #ESRD on HD  -HD MWF  #PAD  -s/p fem-pop bypass and revision  -Continue Plavix    #Acute left femoral vein DVT  -on Heparin 5000 units q 8hrs, since pt with GIB  -Per chart - patient not a candidate for IVC filter??    #Acute blood loss anemia, stable, likely due to UGIB  -EGD showed multiple AVMs, cauterized during EGD  -Protonix 40mg po bid  -Monitor Hg/Hct, transfuse if Hg <8  -EPO with dialysis on M + F    #Chronic diastolic CHF, stable  #Bioprosthetic aortic valve  -TTE done 3/14/2021; reviewed reading  -AV normally functioning  -continue Toresemide 20mg 6 days a week; patient still urinates; aim for euvolemia  -continue Cardizem 240mg daily    #HTN  - continue Hydralazine 25mg bid and cardizem 240mg daily    #DM-II, A1c 5.7 3/26  - ISS  - fignersticks q ac and hs    #BPH  -continue Flomax

## 2021-04-10 NOTE — PROGRESS NOTE ADULT - ASSESSMENT
Pt is a 87 y/o M with PMHx of HTN, HLD, CAD, HFpEF, s/p Right CEA for Carotid artery disease, ESRD on HD 2d/week (M/Fri) via LUE fistula, s/p flecainide w/ ILR placed 6/2020, AS s/p TAVR, and PAD who presented to [facility] on 3/13/21 with GI bleed, as well as LLE pain, found to have occlusion of left superficial femoral artery. Pt underwent EGD and AVM cauterizations. He also underwent left fem-pop bypass x2 and was found to have LLE DVT, treated with heparin. He has functional deficits of with his ambulation and his endurance.      #PAD:- s/p fem-pop bypass and revision  - Completed Keflex 500mg bid , Plavix 75mg  - PT/OT/ST-total of 3 hrs/day 5 days/week     #DVT  - left femoral vein DVT  - on Heparin sq  tid  - Per chart - patient not a candidate for IVC filter    #UGIB  - egd showed multiple AVMs, cauterized during EGD  - Protonix bid    #CKD  - on M/F dialysis with extra dialysis intermittently  - Renal consult  - Epoetin Yanick with dialysis    CHF:- continue Toresemide 20mg 6 days a week  - continue Cardizem 240mg daily    #HTN:- continue Hydralazine 25mg bid- Continue for now- will increase in am if BP still elevated     #DM  - ISS  - fingersticks    #Pain  - Cymbalta 60mg daily  - Tylenol PRN    #BPH:- continue Flomax     #GI  - Miralax, senna      #Skin  - Bilateral heel pressure ulcers DTI: off-load heels  - Sacrum and buttock decubitus ulcers: were unstageable, starting to heal  - Daily dressing changes of left calf incisions  - staples present at groin and lateral thigh    #Diet  - Mechanical Soft  - Consistent Carb, DASH/TLC, 1500mL fluid restriction, Renal diet Pt is a 87 y/o M with PMHx of HTN, HLD, CAD, HFpEF, s/p Right CEA for Carotid artery disease, ESRD on HD 2d/week (M/Fri) via LUE fistula, s/p flecainide w/ ILR placed 6/2020, AS s/p TAVR, and PAD who presented to [facility] on 3/13/21 with GI bleed, as well as LLE pain, found to have occlusion of left superficial femoral artery. Pt underwent EGD and AVM cauterizations. He also underwent left fem-pop bypass x2 and was found to have LLE DVT, treated with heparin. He has functional deficits of with his ambulation and his endurance.      #PAD:- s/p fem-pop bypass and revision  - Completed Keflex 500mg bid , Plavix 75mg  - PT/OT/ST-total of 3 hrs/day 5 days/week     #DVT  - left femoral vein DVT  - on Heparin sq  tid  - Per chart - patient not a candidate for IVC filter    #UGIB  - egd showed multiple AVMs, cauterized during EGD  - Protonix bid    #CKD  - on M/F dialysis with extra dialysis intermittently  - Renal consult  - Epoetin Yanick with dialysis    CHF:- continue Toresemide 20mg 6 days a week  - continue Cardizem 240mg daily  CXR with Pleural effusion - FU next week     #HTN:- continue Hydralazine 25mg bid- Continue for now- will increase in am if BP still elevated     #DM  - ISS  - fingersticks    #Pain  - Cymbalta 60mg daily  - Tylenol PRN    #BPH:- continue Flomax     #GI  - Miralax, senna      #Skin  - Bilateral heel pressure ulcers DTI: off-load heels  - Sacrum and buttock decubitus ulcers: were unstageable, starting to heal  - Daily dressing changes of left calf incisions  - staples present at groin and lateral thigh    #Diet  - Mechanical Soft  - Consistent Carb, DASH/TLC, 1500mL fluid restriction, Renal diet

## 2021-04-11 LAB
GLUCOSE BLDC GLUCOMTR-MCNC: 114 MG/DL — HIGH (ref 70–99)
GLUCOSE BLDC GLUCOMTR-MCNC: 144 MG/DL — HIGH (ref 70–99)
GLUCOSE BLDC GLUCOMTR-MCNC: 191 MG/DL — HIGH (ref 70–99)
SARS-COV-2 RNA SPEC QL NAA+PROBE: SIGNIFICANT CHANGE UP

## 2021-04-11 PROCEDURE — 99232 SBSQ HOSP IP/OBS MODERATE 35: CPT

## 2021-04-11 RX ORDER — HYDRALAZINE HCL 50 MG
25 TABLET ORAL ONCE
Refills: 0 | Status: COMPLETED | OUTPATIENT
Start: 2021-04-11 | End: 2021-04-11

## 2021-04-11 RX ORDER — INSULIN LISPRO 100/ML
VIAL (ML) SUBCUTANEOUS
Refills: 0 | Status: DISCONTINUED | OUTPATIENT
Start: 2021-04-11 | End: 2021-04-30

## 2021-04-11 RX ORDER — HYDRALAZINE HCL 50 MG
50 TABLET ORAL THREE TIMES A DAY
Refills: 0 | Status: DISCONTINUED | OUTPATIENT
Start: 2021-04-11 | End: 2021-04-13

## 2021-04-11 RX ADMIN — SENNA PLUS 2 TABLET(S): 8.6 TABLET ORAL at 22:42

## 2021-04-11 RX ADMIN — Medication 25 MILLIGRAM(S): at 06:28

## 2021-04-11 RX ADMIN — Medication 50 MILLIGRAM(S): at 22:44

## 2021-04-11 RX ADMIN — DULOXETINE HYDROCHLORIDE 60 MILLIGRAM(S): 30 CAPSULE, DELAYED RELEASE ORAL at 11:43

## 2021-04-11 RX ADMIN — Medication 1 TABLET(S): at 11:44

## 2021-04-11 RX ADMIN — Medication 650 MILLIGRAM(S): at 12:20

## 2021-04-11 RX ADMIN — Medication 240 MILLIGRAM(S): at 06:28

## 2021-04-11 RX ADMIN — HEPARIN SODIUM 5000 UNIT(S): 5000 INJECTION INTRAVENOUS; SUBCUTANEOUS at 22:46

## 2021-04-11 RX ADMIN — Medication 650 MILLIGRAM(S): at 23:33

## 2021-04-11 RX ADMIN — CLOPIDOGREL BISULFATE 75 MILLIGRAM(S): 75 TABLET, FILM COATED ORAL at 11:43

## 2021-04-11 RX ADMIN — POLYETHYLENE GLYCOL 3350 17 GRAM(S): 17 POWDER, FOR SOLUTION ORAL at 11:43

## 2021-04-11 RX ADMIN — PANTOPRAZOLE SODIUM 40 MILLIGRAM(S): 20 TABLET, DELAYED RELEASE ORAL at 06:28

## 2021-04-11 RX ADMIN — Medication 6 MILLIGRAM(S): at 22:43

## 2021-04-11 RX ADMIN — HEPARIN SODIUM 5000 UNIT(S): 5000 INJECTION INTRAVENOUS; SUBCUTANEOUS at 13:09

## 2021-04-11 RX ADMIN — TAMSULOSIN HYDROCHLORIDE 0.4 MILLIGRAM(S): 0.4 CAPSULE ORAL at 22:44

## 2021-04-11 RX ADMIN — Medication 25 MILLIGRAM(S): at 10:14

## 2021-04-11 RX ADMIN — Medication 50 MILLIGRAM(S): at 13:09

## 2021-04-11 RX ADMIN — Medication 650 MILLIGRAM(S): at 11:44

## 2021-04-11 RX ADMIN — HEPARIN SODIUM 5000 UNIT(S): 5000 INJECTION INTRAVENOUS; SUBCUTANEOUS at 06:29

## 2021-04-11 RX ADMIN — PANTOPRAZOLE SODIUM 40 MILLIGRAM(S): 20 TABLET, DELAYED RELEASE ORAL at 17:09

## 2021-04-11 RX ADMIN — Medication 650 MILLIGRAM(S): at 22:44

## 2021-04-11 RX ADMIN — Medication 2: at 17:09

## 2021-04-11 NOTE — PROGRESS NOTE ADULT - SUBJECTIVE AND OBJECTIVE BOX
CHRISTIANO ELIZABETH  86y  Male    lethargic but arousable    PAST MEDICAL & SURGICAL HISTORY:  DM (diabetes mellitus)  - resolved    AV block, 1st degree    Arrhythmia    CAD (coronary artery disease)    HTN (hypertension)    VT (ventricular tachycardia)    RICHARDS (dyspnea on exertion)    Anemia    Risk factors for obstructive sleep apnea    ESRD on dialysis  HD on Mondays and Fridays    Aortic stenosis  - s/p valve replacement    GI bleeding  due to ulcerated polyps and colonic AVMs    H/O circumcision  at  age  65    H/O left knee surgery    H/O angioplasty  2013,  no  intervention    A-V fistula  left arm 5/2017    H/O carotid endarterectomy  Right    S/P TAVR (transcatheter aortic valve replacement)    History of loop recorder            PHYSICAL EXAM:    T(C): 36.4 (04-11-21 @ 07:31), Max: 36.8 (04-10-21 @ 21:04)  HR: 65 (04-11-21 @ 09:06) (63 - 77)  BP: 173/60 (04-11-21 @ 07:31) (155/68 - 173/60)  RR: 16 (04-11-21 @ 09:06) (15 - 16)  SpO2: 94% (04-11-21 @ 09:06) (93% - 97%)  Wt(kg): --    I&O's Detail    11 Apr 2021 07:01  -  11 Apr 2021 13:01  --------------------------------------------------------  IN:  Total IN: 0 mL    OUT:    Voided (mL): 150 mL  Total OUT: 150 mL    Total NET: -150 mL          Respiratory: clear anteriorly, decreased BS at bases  Cardiovascular: S1 S2  Gastrointestinal: soft NT ND +BS  Extremities: edema   Neuro: Awake and alert    MEDICATIONS  (STANDING):  clopidogrel Tablet 75 milliGRAM(s) Oral daily  dextrose 40% Gel 15 Gram(s) Oral once  dextrose 5%. 1000 milliLiter(s) (50 mL/Hr) IV Continuous <Continuous>  dextrose 5%. 1000 milliLiter(s) (100 mL/Hr) IV Continuous <Continuous>  dextrose 50% Injectable 25 Gram(s) IV Push once  dextrose 50% Injectable 12.5 Gram(s) IV Push once  dextrose 50% Injectable 25 Gram(s) IV Push once  diltiazem    milliGRAM(s) Oral daily  DULoxetine 60 milliGRAM(s) Oral daily  epoetin juan-epbx (RETACRIT) Injectable 06782 Unit(s) IV Push <User Schedule>  glucagon  Injectable 1 milliGRAM(s) IntraMuscular once  heparin   Injectable 5000 Unit(s) SubCutaneous every 8 hours  hydrALAZINE 50 milliGRAM(s) Oral three times a day  insulin lispro (ADMELOG) corrective regimen sliding scale   SubCutaneous three times a day before meals  insulin lispro (ADMELOG) corrective regimen sliding scale   SubCutaneous at bedtime  melatonin 6 milliGRAM(s) Oral at bedtime  Nephro-pito 1 Tablet(s) Oral daily  pantoprazole    Tablet 40 milliGRAM(s) Oral two times a day  polyethylene glycol 3350 17 Gram(s) Oral daily  senna 2 Tablet(s) Oral at bedtime  tamsulosin 0.4 milliGRAM(s) Oral at bedtime    MEDICATIONS  (PRN):  acetaminophen   Tablet .. 650 milliGRAM(s) Oral every 6 hours PRN Temp greater or equal to 38C (100.4F), Mild Pain (1 - 3)  artificial  tears Solution 1 Drop(s) Both EYES two times a day PRN Dry Eyes                  Creatinine Trend: Creatinine Trend: 4.30<--, 3.74<--, 5.09<--, 4.91<--, 4.24<--, 3.42<--

## 2021-04-11 NOTE — PROGRESS NOTE ADULT - ASSESSMENT
Pt is a 87 y/o M with PMHx of HTN, HLD, CAD, HFpEF, s/p Right CEA for Carotid artery disease, ESRD on HD 2d/week (M/Fri) via LUE fistula, s/p flecainide w/ ILR placed 6/2020, AS s/p TAVR, and PAD who presented to [facility] on 3/13/21 with GI bleed, as well as LLE pain, found to have occlusion of left superficial femoral artery. Pt underwent EGD and AVM cauterizations. He also underwent left fem-pop bypass x2 and was found to have LLE DVT, treated with heparin. He has functional deficits of with his ambulation and his endurance.       Comprehensive rehab program : PT/OT/ST-total of 3 hrs/day 5 days/week     #PAD:- s/p fem-pop bypass and revision:- Completed Keflex 500mg bid for possible superficial infection, Continue Plavix 75mg    #DVT  - left femoral vein DVT  - on Heparin sq  tid  - Per chart - patient not a candidate for IVC filter    #UGIB  - egd showed multiple AVMs, cauterized during EGD  - Protonix bid    #CKD:- on M/F dialysis with extra dialysis intermittently  - Renal consult  - Epoetin Yanick with dialysis      CHF:-- continue Cardizem 240mg daily - Torsemide d/c by renal  - 4/9   CXR with Pleural effusion - FU this  week     #HTN:- continue Hydralazine changed to 50mg tid     #DM:- ISS  Blood sugars are good, reduce FS to bid     #Pain:- Cymbalta 60mg daily  - Tylenol PRN    #BPH:- continue Flomax , Patient with some spontaneous void     #GI:- Miralax, senna      #Skin  - Bilateral heel pressure ulcers DTI: off-load heels  - Sacrum and buttock decubitus ulcers: were unstageable, starting to heal  - Daily dressing changes of left calf incisions  - staples present at groin and lateral thigh    #Diet  - Mechanical Soft  - Consistent Carb, DASH/TLC, 1500mL fluid restriction, Renal diet    Hospitalist and renal fu noted

## 2021-04-11 NOTE — PROGRESS NOTE ADULT - SUBJECTIVE AND OBJECTIVE BOX
Patient is a 86y old  Male who presents with a chief complaint of Impairment group -13 - PAD (10 Apr 2021 14:39)      SUBJECTIVE / OVERNIGHT EVENTS:  Pt seen and examined at bedside. No acute events overnight.  Pt denies cp, palpitations, sob, abd pain, N/V, fever, chills.    ROS:  All other review of systems negative    Allergies    Plavix (Hives)  Toprol-XL (Rash)    Intolerances        MEDICATIONS  (STANDING):  clopidogrel Tablet 75 milliGRAM(s) Oral daily  dextrose 40% Gel 15 Gram(s) Oral once  dextrose 5%. 1000 milliLiter(s) (50 mL/Hr) IV Continuous <Continuous>  dextrose 5%. 1000 milliLiter(s) (100 mL/Hr) IV Continuous <Continuous>  dextrose 50% Injectable 25 Gram(s) IV Push once  dextrose 50% Injectable 12.5 Gram(s) IV Push once  dextrose 50% Injectable 25 Gram(s) IV Push once  diltiazem    milliGRAM(s) Oral daily  DULoxetine 60 milliGRAM(s) Oral daily  epoetin juan-epbx (RETACRIT) Injectable 80896 Unit(s) IV Push <User Schedule>  glucagon  Injectable 1 milliGRAM(s) IntraMuscular once  heparin   Injectable 5000 Unit(s) SubCutaneous every 8 hours  hydrALAZINE 50 milliGRAM(s) Oral three times a day  hydrALAZINE 25 milliGRAM(s) Oral once  insulin lispro (ADMELOG) corrective regimen sliding scale   SubCutaneous three times a day before meals  insulin lispro (ADMELOG) corrective regimen sliding scale   SubCutaneous at bedtime  melatonin 6 milliGRAM(s) Oral at bedtime  Nephro-pito 1 Tablet(s) Oral daily  pantoprazole    Tablet 40 milliGRAM(s) Oral two times a day  polyethylene glycol 3350 17 Gram(s) Oral daily  senna 2 Tablet(s) Oral at bedtime  tamsulosin 0.4 milliGRAM(s) Oral at bedtime    MEDICATIONS  (PRN):  acetaminophen   Tablet .. 650 milliGRAM(s) Oral every 6 hours PRN Temp greater or equal to 38C (100.4F), Mild Pain (1 - 3)  artificial  tears Solution 1 Drop(s) Both EYES two times a day PRN Dry Eyes      Vital Signs Last 24 Hrs  T(C): 36.4 (11 Apr 2021 07:31), Max: 36.8 (10 Apr 2021 21:04)  T(F): 97.6 (11 Apr 2021 07:31), Max: 98.2 (10 Apr 2021 21:04)  HR: 65 (11 Apr 2021 09:06) (63 - 77)  BP: 173/60 (11 Apr 2021 07:31) (155/68 - 173/60)  BP(mean): --  RR: 16 (11 Apr 2021 09:06) (15 - 16)  SpO2: 94% (11 Apr 2021 09:06) (93% - 97%)  CAPILLARY BLOOD GLUCOSE      POCT Blood Glucose.: 114 mg/dL (11 Apr 2021 07:48)  POCT Blood Glucose.: 124 mg/dL (10 Apr 2021 23:18)  POCT Blood Glucose.: 172 mg/dL (10 Apr 2021 17:33)  POCT Blood Glucose.: 173 mg/dL (10 Apr 2021 11:42)    I&O's Summary      PHYSICAL EXAM:  GENERAL: NAD, Elderly male  HEAD:  Atraumatic, Normocephalic  EYES: EOMI, PERRLA, conjunctiva and sclera clear  CHEST/LUNG: Clear to auscultation bilaterally; No wheeze, nonlabored breathing  HEART: Regular rate and rhythm; No murmurs, rubs, or gallops  ABDOMEN: Soft, Nontender, Nondistended; Bowel sounds present  EXTREMITIES:  2+ Peripheral Pulses, No clubbing, cyanosis. B/L LE 2+ Pitting edema  NEUROLOGY: AAOx3, non-focal  PSYCH: calm, appropriate mood  SKIN: LUE AVF, LLE Surgical site c/d/i     LABS:                    RADIOLOGY & ADDITIONAL TESTS:  Results Reviewed:   Imaging Personally Reviewed:  Electrocardiogram Personally Reviewed:    COORDINATION OF CARE:  Care Discussed with Consultants/Other Providers [Y/N]:  Prior or Outpatient Records Reviewed [Y/N]:

## 2021-04-11 NOTE — PROGRESS NOTE ADULT - ASSESSMENT
85 y/o M with PMHx of HTN, HLD, CAD, HFpEF, s/p Right CEA for Carotid artery disease, ESRD on HD 2d/week (M/Fri) via LUE fistula, s/p flecainide w/ ILR placed 6/2020, AS s/p TAVR, and PAD now in acute rehab. Had stable dialysis on friday and is scheduled for HD tomorrow.

## 2021-04-11 NOTE — PROGRESS NOTE ADULT - ASSESSMENT
87 y/o M with extensive PMH including HTN, HLD, CAD, HFpEF, s/p Right CEA for Carotid artery disease, ESRD on HD 2d/week (M/Fri) via LUE fistula, s/p flecainide w/ ILR placed 6/2020, AS s/p TAVR, and PAD (RLE fem-pop bypass October 2020) who presented to [facility] on 3/13/21 with GI bleed, as well as LLE pain due to occulsion of L superficial fem artery.  He had a complicated hospital course and also had acute DVT, PNA, bilateral pleural effusions.  Pt apparently was not a candidate for an IVC filter.  #ESRD on HD  -HD MWF  #PAD  -s/p fem-pop bypass and revision  -Continue Plavix    #Acute left femoral vein DVT  -on Heparin 5000 units q 8hrs, since pt with GIB  -Per chart - patient not a candidate for IVC filter??    #Acute blood loss anemia, stable, likely due to UGIB  -EGD showed multiple AVMs, cauterized during EGD  -Protonix 40mg po bid  -Monitor Hg/Hct, transfuse if Hg <8  -EPO with dialysis on M + F    #Chronic diastolic CHF, stable  #Bioprosthetic aortic valve  -TTE done 3/14/2021; reviewed reading  -AV normally functioning  -continue Toresemide 20mg 6 days a week; patient still urinates; aim for euvolemia  -continue Cardizem 240mg daily    #HTN  -Chronic uncontrolled  -Increase Hydralazine to 50mg TID  -Continue Cardizem   -Monitor vitals    #DM-II, A1c 5.7 3/26  - ISS  - fignersticks q ac and hs    #BPH  -continue Flomax

## 2021-04-11 NOTE — PROGRESS NOTE ADULT - SUBJECTIVE AND OBJECTIVE BOX
HPI:  Pt is a 87 y/o M with PMHx of HTN, HLD, CAD, HFpEF, s/p Right CEA for Carotid artery disease, ESRD on HD 2d/week (M/Fri) via LUE fistula, s/p flecainide w/ ILR placed 6/2020, AS s/p TAVR, and PAD (RLE fem-pop bypass October 2020) who presented to [facility] on 3/13/21 with GI bleed, as well as LLE pain. 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 When patient was stable from GI, medical, cardiac standpoint, he went for LLE angiogram on 3/18, and found occlusion of L Superficial femoral artery. He was planned for a LLE fem-pop bypass, and had the bypass performed on 3/20. On 3/22, patient was a RRT for AMS, fever. Work-up revealed pleural effusions and L femoral DVT. He was started on abx for presumed pna and hep gtt for DVT. Patient began to show signs of malperfusion with coolness to touch, mottling of skin to LLE. He was planned for revision of LLE bypass. Went to OR on 3/25 for LLE bypass revision with explant of PTFE bypass, CFA to AT bypass with cryovein performed. Intra-op patient with 1500 ml of blood loss requiring 8 unit PRBC, 4 unit FFP, 1 donor unit plt.  During procedure, pt lost function of PPM with period of asystole requiring transcutaneous pacing. Cardiology consulted and pacemaker interrogated in the OR.  Pt taken to PACU post op and SICU consulted. Patient went to SICU post-op. On 3/27 patient was extubated and was downgraded from SICU to floor the next day. Rest of hospital course was uneventful. He worked with PT, was evaluated by PM&R and approved for acute rehab. The patient remained on heparin sq while in the hospital for tx of dvt (his plavix was held). Upon discharge, approved by CHALINO and Dr. Albrecht to resume plavix. He will remain on heparin sq while at rehab for dvt tx and ppx. He is not a candidate for IVC filter. Upon discharge, pain is well controlled, tolerating diet, remains HD stable, no clinical signs/symptoms of GI bleed, LLE remains warm and well perfused, he has a strong LLE palpable graft pulse and dopplerable signals. Per attending, he is medically stable for discharge to AR and on 4/8/21 he was transferred to [facility] for acute inpatient rehabilitation.    SUBJECTIVE / INTERVAL HPI: Patient seen and examined at bedside this am   Continues to moan and groan at times- per daughter does it at home too even prior to this medical course  Patient appears comfortable. At times when in bed, states that he is short of breath, but improves when repositioned  This afternoon, seated in WC and states that he feels well      REVIEW OF SYSTEMS:    CONSTITUTIONAL: No fevers or chills  EYES/ENT: No visual changes;    RESPIRATORY: No cough, +mild shortness of breath  CARDIOVASCULAR: No chest pain   GASTROINTESTINAL: No abdominal or epigastric pain.   GENITOURINARY: No dysuria,  NEUROLOGICAL: No numbness, +weakness  SKIN: No itching, rashes, +left groin and leg incision sites, +heel wounds, +sacral wounds    Vital Signs Last 24 Hrs  T(C): 36.4 (11 Apr 2021 07:31), Max: 36.8 (10 Apr 2021 21:04)  T(F): 97.6 (11 Apr 2021 07:31), Max: 98.2 (10 Apr 2021 21:04)  HR: 61 (11 Apr 2021 13:12) (61 - 77)  BP: 167/70 (11 Apr 2021 13:12) (155/68 - 173/60)  BP(mean): --  RR: 16 (11 Apr 2021 13:12) (15 - 16)  SpO2: 97% (11 Apr 2021 13:12) (93% - 97%)    PHYSICAL EXAM:    General: NAD, sitting up in chair   Cardiovascular: +S1/S2,   Respiratory:  mild crackles at bases  Gastrointestinal: soft, NT/ND  Extremities: LUE with AV fistula, bilateral lower extremity edema with mild tenderness to touch. Feet warm to touch  Skin: Decubitus bilateral heel pressure ulcers DTI, Left groin and lateral thigh with staples. Left calf incision lateral and medial with dressing present. Gangrenous toes on left foot. Decubitus sacral and buttock ulcers present- noted at admission       MEDICATIONS  (STANDING):  clopidogrel Tablet 75 milliGRAM(s) Oral daily  dextrose 40% Gel 15 Gram(s) Oral once  dextrose 5%. 1000 milliLiter(s) (50 mL/Hr) IV Continuous <Continuous>  dextrose 5%. 1000 milliLiter(s) (100 mL/Hr) IV Continuous <Continuous>  dextrose 50% Injectable 25 Gram(s) IV Push once  dextrose 50% Injectable 12.5 Gram(s) IV Push once  dextrose 50% Injectable 25 Gram(s) IV Push once  diltiazem    milliGRAM(s) Oral daily  DULoxetine 60 milliGRAM(s) Oral daily  epoetin juan-epbx (RETACRIT) Injectable 51923 Unit(s) IV Push <User Schedule>  glucagon  Injectable 1 milliGRAM(s) IntraMuscular once  heparin   Injectable 5000 Unit(s) SubCutaneous every 8 hours  hydrALAZINE 50 milliGRAM(s) Oral three times a day  insulin lispro (ADMELOG) corrective regimen sliding scale   SubCutaneous three times a day before meals  insulin lispro (ADMELOG) corrective regimen sliding scale   SubCutaneous at bedtime  melatonin 6 milliGRAM(s) Oral at bedtime  Nephro-pito 1 Tablet(s) Oral daily  pantoprazole    Tablet 40 milliGRAM(s) Oral two times a day  polyethylene glycol 3350 17 Gram(s) Oral daily  senna 2 Tablet(s) Oral at bedtime  tamsulosin 0.4 milliGRAM(s) Oral at bedtime    MEDICATIONS  (PRN):  acetaminophen   Tablet .. 650 milliGRAM(s) Oral every 6 hours PRN Temp greater or equal to 38C (100.4F), Mild Pain (1 - 3)  artificial  tears Solution 1 Drop(s) Both EYES two times a day PRN Dry Eyes                                             9.2    9.58  )-----------( 464      ( 09 Apr 2021 06:30 )             28.4     04-09    139  |  103  |  45<H>  ----------------------------<  130<H>  3.7   |  27  |  4.30<H>    Ca    8.6      09 Apr 2021 06:30      CAPILLARY BLOOD GLUCOSE      POCT Blood Glucose.: 144 mg/dL (11 Apr 2021 11:33)  POCT Blood Glucose.: 114 mg/dL (11 Apr 2021 07:48)  POCT Blood Glucose.: 124 mg/dL (10 Apr 2021 23:18)  POCT Blood Glucose.: 172 mg/dL (10 Apr 2021 17:33)

## 2021-04-12 LAB
ANION GAP SERPL CALC-SCNC: 10 MMOL/L — SIGNIFICANT CHANGE UP (ref 5–17)
BUN SERPL-MCNC: 44 MG/DL — HIGH (ref 7–23)
CALCIUM SERPL-MCNC: 8.7 MG/DL — SIGNIFICANT CHANGE UP (ref 8.4–10.5)
CHLORIDE SERPL-SCNC: 105 MMOL/L — SIGNIFICANT CHANGE UP (ref 96–108)
CO2 SERPL-SCNC: 28 MMOL/L — SIGNIFICANT CHANGE UP (ref 22–31)
CREAT SERPL-MCNC: 4.55 MG/DL — HIGH (ref 0.5–1.3)
GLUCOSE BLDC GLUCOMTR-MCNC: 110 MG/DL — HIGH (ref 70–99)
GLUCOSE BLDC GLUCOMTR-MCNC: 114 MG/DL — HIGH (ref 70–99)
GLUCOSE SERPL-MCNC: 131 MG/DL — HIGH (ref 70–99)
HCT VFR BLD CALC: 30.8 % — LOW (ref 39–50)
HGB BLD-MCNC: 9.9 G/DL — LOW (ref 13–17)
MCHC RBC-ENTMCNC: 29.8 PG — SIGNIFICANT CHANGE UP (ref 27–34)
MCHC RBC-ENTMCNC: 32.1 GM/DL — SIGNIFICANT CHANGE UP (ref 32–36)
MCV RBC AUTO: 92.8 FL — SIGNIFICANT CHANGE UP (ref 80–100)
NRBC # BLD: 0 /100 WBCS — SIGNIFICANT CHANGE UP (ref 0–0)
PLATELET # BLD AUTO: 389 K/UL — SIGNIFICANT CHANGE UP (ref 150–400)
POTASSIUM SERPL-MCNC: 3.8 MMOL/L — SIGNIFICANT CHANGE UP (ref 3.5–5.3)
POTASSIUM SERPL-SCNC: 3.8 MMOL/L — SIGNIFICANT CHANGE UP (ref 3.5–5.3)
RBC # BLD: 3.32 M/UL — LOW (ref 4.2–5.8)
RBC # FLD: 16.7 % — HIGH (ref 10.3–14.5)
SODIUM SERPL-SCNC: 143 MMOL/L — SIGNIFICANT CHANGE UP (ref 135–145)
WBC # BLD: 7.36 K/UL — SIGNIFICANT CHANGE UP (ref 3.8–10.5)
WBC # FLD AUTO: 7.36 K/UL — SIGNIFICANT CHANGE UP (ref 3.8–10.5)

## 2021-04-12 PROCEDURE — 99232 SBSQ HOSP IP/OBS MODERATE 35: CPT

## 2021-04-12 RX ADMIN — PANTOPRAZOLE SODIUM 40 MILLIGRAM(S): 20 TABLET, DELAYED RELEASE ORAL at 19:55

## 2021-04-12 RX ADMIN — PANTOPRAZOLE SODIUM 40 MILLIGRAM(S): 20 TABLET, DELAYED RELEASE ORAL at 04:50

## 2021-04-12 RX ADMIN — Medication 1 TABLET(S): at 11:37

## 2021-04-12 RX ADMIN — TAMSULOSIN HYDROCHLORIDE 0.4 MILLIGRAM(S): 0.4 CAPSULE ORAL at 21:31

## 2021-04-12 RX ADMIN — HEPARIN SODIUM 5000 UNIT(S): 5000 INJECTION INTRAVENOUS; SUBCUTANEOUS at 04:50

## 2021-04-12 RX ADMIN — CLOPIDOGREL BISULFATE 75 MILLIGRAM(S): 75 TABLET, FILM COATED ORAL at 11:37

## 2021-04-12 RX ADMIN — ERYTHROPOIETIN 10000 UNIT(S): 10000 INJECTION, SOLUTION INTRAVENOUS; SUBCUTANEOUS at 16:30

## 2021-04-12 RX ADMIN — Medication 50 MILLIGRAM(S): at 04:50

## 2021-04-12 RX ADMIN — Medication 50 MILLIGRAM(S): at 21:31

## 2021-04-12 RX ADMIN — SENNA PLUS 2 TABLET(S): 8.6 TABLET ORAL at 21:31

## 2021-04-12 RX ADMIN — Medication 650 MILLIGRAM(S): at 04:50

## 2021-04-12 RX ADMIN — Medication 240 MILLIGRAM(S): at 04:50

## 2021-04-12 RX ADMIN — Medication 650 MILLIGRAM(S): at 05:55

## 2021-04-12 RX ADMIN — HEPARIN SODIUM 5000 UNIT(S): 5000 INJECTION INTRAVENOUS; SUBCUTANEOUS at 21:31

## 2021-04-12 RX ADMIN — HEPARIN SODIUM 5000 UNIT(S): 5000 INJECTION INTRAVENOUS; SUBCUTANEOUS at 14:27

## 2021-04-12 RX ADMIN — DULOXETINE HYDROCHLORIDE 60 MILLIGRAM(S): 30 CAPSULE, DELAYED RELEASE ORAL at 11:37

## 2021-04-12 NOTE — PROGRESS NOTE ADULT - SUBJECTIVE AND OBJECTIVE BOX
Patient is a 86y old  Male who presents with a chief complaint of Impairment group -13 - PAD (11 Apr 2021 13:56)      SUBJECTIVE / OVERNIGHT EVENTS:  Pt seen and examined at bedside. No acute events overnight.  Pt denies cp, palpitations, sob, abd pain, N/V, fever, chills.    ROS:  All other review of systems negative    Allergies    Plavix (Hives)  Toprol-XL (Rash)    Intolerances        MEDICATIONS  (STANDING):  clopidogrel Tablet 75 milliGRAM(s) Oral daily  dextrose 40% Gel 15 Gram(s) Oral once  dextrose 5%. 1000 milliLiter(s) (50 mL/Hr) IV Continuous <Continuous>  dextrose 5%. 1000 milliLiter(s) (100 mL/Hr) IV Continuous <Continuous>  dextrose 50% Injectable 25 Gram(s) IV Push once  dextrose 50% Injectable 12.5 Gram(s) IV Push once  dextrose 50% Injectable 25 Gram(s) IV Push once  diltiazem    milliGRAM(s) Oral daily  DULoxetine 60 milliGRAM(s) Oral daily  epoetin juan-epbx (RETACRIT) Injectable 80350 Unit(s) IV Push <User Schedule>  glucagon  Injectable 1 milliGRAM(s) IntraMuscular once  heparin   Injectable 5000 Unit(s) SubCutaneous every 8 hours  hydrALAZINE 50 milliGRAM(s) Oral three times a day  insulin lispro (ADMELOG) corrective regimen sliding scale   SubCutaneous two times a day with meals  melatonin 6 milliGRAM(s) Oral at bedtime  Nephro-pito 1 Tablet(s) Oral daily  pantoprazole    Tablet 40 milliGRAM(s) Oral two times a day  polyethylene glycol 3350 17 Gram(s) Oral daily  senna 2 Tablet(s) Oral at bedtime  tamsulosin 0.4 milliGRAM(s) Oral at bedtime    MEDICATIONS  (PRN):  acetaminophen   Tablet .. 650 milliGRAM(s) Oral every 6 hours PRN Temp greater or equal to 38C (100.4F), Mild Pain (1 - 3)  artificial  tears Solution 1 Drop(s) Both EYES two times a day PRN Dry Eyes      Vital Signs Last 24 Hrs  T(C): 36.3 (12 Apr 2021 07:42), Max: 36.6 (12 Apr 2021 04:40)  T(F): 97.4 (12 Apr 2021 07:42), Max: 97.9 (12 Apr 2021 04:40)  HR: 64 (12 Apr 2021 07:42) (61 - 68)  BP: 170/66 (12 Apr 2021 09:18) (159/78 - 186/70)  BP(mean): --  RR: 16 (12 Apr 2021 07:42) (15 - 16)  SpO2: 96% (12 Apr 2021 07:42) (94% - 97%)  CAPILLARY BLOOD GLUCOSE      POCT Blood Glucose.: 110 mg/dL (12 Apr 2021 07:43)  POCT Blood Glucose.: 191 mg/dL (11 Apr 2021 16:58)  POCT Blood Glucose.: 144 mg/dL (11 Apr 2021 11:33)    I&O's Summary    11 Apr 2021 07:01  -  12 Apr 2021 07:00  --------------------------------------------------------  IN: 0 mL / OUT: 150 mL / NET: -150 mL        PHYSICAL EXAM:  GENERAL: NAD, Elderly male  HEAD:  Atraumatic, Normocephalic  EYES: EOMI, PERRLA, conjunctiva and sclera clear  CHEST/LUNG: Clear to auscultation bilaterally; No wheeze, nonlabored breathing  HEART: Regular rate and rhythm; No murmurs, rubs, or gallops  ABDOMEN: Soft, Nontender, Nondistended; Bowel sounds present  EXTREMITIES:  2+ Peripheral Pulses, No clubbing, cyanosis. B/L LE 2+ Pitting edema  NEUROLOGY: AAOx3, non-focal  PSYCH: calm, appropriate mood  SKIN: RADE AVF LLE Surgical site c/d/i     LABS:                        9.9    7.36  )-----------( 389      ( 12 Apr 2021 05:30 )             30.8     04-12    143  |  105  |  44<H>  ----------------------------<  131<H>  3.8   |  28  |  4.55<H>    Ca    8.7      12 Apr 2021 05:30                RADIOLOGY & ADDITIONAL TESTS:  Results Reviewed:   Imaging Personally Reviewed:  Electrocardiogram Personally Reviewed:    COORDINATION OF CARE:  Care Discussed with Consultants/Other Providers [Y/N]:  Prior or Outpatient Records Reviewed [Y/N]:

## 2021-04-12 NOTE — PROGRESS NOTE ADULT - SUBJECTIVE AND OBJECTIVE BOX
HPI:  Pt is a 85 y/o M with PMHx of HTN, HLD, CAD, HFpEF, s/p Right CEA for Carotid artery disease, ESRD on HD 2d/week (M/Fri) via LUE fistula, s/p flecainide w/ ILR placed 6/2020, AS s/p TAVR, and PAD (RLE fem-pop bypass October 2020) who presented to [facility] on 3/13/21 with GI bleed, as well as LLE pain. 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 When patient was stable from GI, medical, cardiac standpoint, he went for LLE angiogram on 3/18, and found occlusion of L Superficial femoral artery. He was planned for a LLE fem-pop bypass, and had the bypass performed on 3/20. On 3/22, patient was a RRT for AMS, fever. Work-up revealed pleural effusions and L femoral DVT. He was started on abx for presumed pna and hep gtt for DVT. Patient began to show signs of malperfusion with coolness to touch, mottling of skin to LLE. He was planned for revision of LLE bypass. Went to OR on 3/25 for LLE bypass revision with explant of PTFE bypass, CFA to AT bypass with cryovein performed. Intra-op patient with 1500 ml of blood loss requiring 8 unit PRBC, 4 unit FFP, 1 donor unit plt.  During procedure, pt lost function of PPM with period of asystole requiring transcutaneous pacing. Cardiology consulted and pacemaker interrogated in the OR.  Pt taken to PACU post op and SICU consulted. Patient went to SICU post-op. On 3/27 patient was extubated and was downgraded from SICU to floor the next day. Rest of hospital course was uneventful. He worked with PT, was evaluated by PM&R and approved for acute rehab. The patient remained on heparin sq while in the hospital for tx of dvt (his plavix was held). Upon discharge, approved by CHALINO and Dr. Albrecht to resume plavix. He will remain on heparin sq while at rehab for dvt tx and ppx. He is not a candidate for IVC filter. Upon discharge, pain is well controlled, tolerating diet, remains HD stable, no clinical signs/symptoms of GI bleed, LLE remains warm and well perfused, he has a strong LLE palpable graft pulse and dopplerable signals. Per attending, he is medically stable for discharge to AR and on 4/8/21 he was transferred to [facility] for acute inpatient rehabilitation.    SUBJECTIVE / INTERVAL HPI:   Patient seen and evaluated at bedside   Does not sleep well overnight - feels tired during the day  He needs lots of encouragement/coaching to perform  He states breathing is better - has NC partially on  Denies CP/SOB/pain/urinary symptoms     REVIEW OF SYSTEMS:  CONSTITUTIONAL: No fevers or chills  EYES/ENT: No visual changes;    RESPIRATORY: No cough, intermittent SOB  CARDIOVASCULAR: No chest pain   GASTROINTESTINAL: No abdominal or epigastric pain.   GENITOURINARY: No dysuria,  NEUROLOGICAL: No numbness, +weakness  SKIN: No itching, rashes, +left groin and leg incision sites, +heel wounds, +sacral wounds    Vital Signs Last 24 Hrs  T(C): 36.3 (12 Apr 2021 07:42), Max: 36.6 (12 Apr 2021 04:40)  T(F): 97.4 (12 Apr 2021 07:42), Max: 97.9 (12 Apr 2021 04:40)  HR: 64 (12 Apr 2021 07:42) (61 - 68)  BP: 170/66 (12 Apr 2021 09:18) (159/78 - 186/70)  BP(mean): --  RR: 16 (12 Apr 2021 07:42) (15 - 16)  SpO2: 96% (12 Apr 2021 07:42) (94% - 97%)    PHYSICAL EXAM:    General: NAD, appears tired  Cardiovascular: +S1/S2,   Respiratory:  diminished to bases  Gastrointestinal: soft, NT/ND  Extremities: LUE with AV fistula, bilateral lower extremity edema with mild tenderness to touch. Feet warm to touch  Neuro: alert and oriented to self, uncertain if it's march or april; thinks he's still at [facility], but easily oriented, states it's 2001.  able to recall what he had for breakfast  Skin: Decubitus bilateral heel pressure ulcers DTI, Left groin and lateral thigh with staples. Left calf dressing with serosanguinous drainage, changed - incisions: lateral (suture) and medial (staples).  Gangrenous toes on left foot. Decubitus sacral and buttock ulcers present- noted at admission     MEDICATIONS  (STANDING):  clopidogrel Tablet 75 milliGRAM(s) Oral daily  dextrose 40% Gel 15 Gram(s) Oral once  dextrose 5%. 1000 milliLiter(s) (50 mL/Hr) IV Continuous <Continuous>  dextrose 5%. 1000 milliLiter(s) (100 mL/Hr) IV Continuous <Continuous>  dextrose 50% Injectable 25 Gram(s) IV Push once  dextrose 50% Injectable 12.5 Gram(s) IV Push once  dextrose 50% Injectable 25 Gram(s) IV Push once  diltiazem    milliGRAM(s) Oral daily  DULoxetine 60 milliGRAM(s) Oral daily  epoetin juan-epbx (RETACRIT) Injectable 45973 Unit(s) IV Push <User Schedule>  glucagon  Injectable 1 milliGRAM(s) IntraMuscular once  heparin   Injectable 5000 Unit(s) SubCutaneous every 8 hours  hydrALAZINE 50 milliGRAM(s) Oral three times a day  insulin lispro (ADMELOG) corrective regimen sliding scale   SubCutaneous two times a day with meals  melatonin 6 milliGRAM(s) Oral at bedtime  Nephro-pito 1 Tablet(s) Oral daily  pantoprazole    Tablet 40 milliGRAM(s) Oral two times a day  polyethylene glycol 3350 17 Gram(s) Oral daily  senna 2 Tablet(s) Oral at bedtime  tamsulosin 0.4 milliGRAM(s) Oral at bedtime    MEDICATIONS  (PRN):  acetaminophen   Tablet .. 650 milliGRAM(s) Oral every 6 hours PRN Temp greater or equal to 38C (100.4F), Mild Pain (1 - 3)  artificial  tears Solution 1 Drop(s) Both EYES two times a day PRN Dry Eyes            LAB                        9.9    7.36  )-----------( 389      ( 12 Apr 2021 05:30 )             30.8     04-12    143  |  105  |  44<H>  ----------------------------<  131<H>  3.8   |  28  |  4.55<H>    Ca    8.7      12 Apr 2021 05:30

## 2021-04-12 NOTE — PROGRESS NOTE ADULT - ASSESSMENT
87 y/o M with extensive PMH including HTN, HLD, CAD, HFpEF, s/p Right CEA for Carotid artery disease, ESRD on HD 2d/week (M/Fri) via LUE fistula, s/p flecainide w/ ILR placed 6/2020, AS s/p TAVR, and PAD (RLE fem-pop bypass October 2020) who presented to [facility] on 3/13/21 with GI bleed, as well as LLE pain due to occulsion of L superficial fem artery.  He had a complicated hospital course and also had acute DVT, PNA, bilateral pleural effusions.  Pt apparently was not a candidate for an IVC filter.  #ESRD on HD  -HD MWF  #PAD  -s/p fem-pop bypass and revision  -Continue Plavix    #Acute left femoral vein DVT  -Not on full dose AC due to GIB and high risk. If H/H remains stable and pt without recurrent episodes, we may restart AC later on  -DVT ppx with HSQ  -Per chart - patient not a candidate for IVC filter??    #Acute blood loss anemia, stable, likely due to UGIB  -EGD showed multiple AVMs, cauterized during EGD  -Protonix 40mg po bid  -Monitor Hg/Hct, transfuse if Hg <8  -EPO with dialysis on M + F    #Chronic diastolic CHF, stable  #Bioprosthetic aortic valve  -TTE done 3/14/2021; reviewed reading  -AV normally functioning  -continue Cardizem 240mg daily    #HTN  -Chronic uncontrolled  -C/w Hydralazine 50mg TID  -Continue Cardizem   -Monitor vitals    #DM-II, A1c 5.7 3/26  - ISS  - fignersticks q ac and hs    #BPH  -continue Flomax

## 2021-04-12 NOTE — PROGRESS NOTE ADULT - ATTENDING COMMENTS
Chart reviewed. Patient seen at bedside  Poor sleep over night, appears tired, but conversing and was able to tolerate therapies this am . Discussed with team to schedule PT as first therapy for the day. Needed cues for orientation  Not dyspneic or tachypneic   Left knee pain and patient tends to keep knee flexed     2. He denies any dizziness/HA/CP/palpitations/abdominal pain   Appetite minimally improved. Needs encouragement    3. Left leg incisions- serosanginuous drainage and dressing changed. Groin incision with staples- clean     4. Labs reviewed    5. Continue rehab program  D/W daughter over phone     Hospitalist and renal fu noted

## 2021-04-12 NOTE — PROGRESS NOTE ADULT - ASSESSMENT
Pt is a 85 y/o M with PMHx of HTN, HLD, CAD, HFpEF, s/p Right CEA for Carotid artery disease, ESRD on HD 2d/week (M/Fri) via LUE fistula, s/p flecainide w/ ILR placed 6/2020, AS s/p TAVR, and PAD who presented to [facility] on 3/13/21 with GI bleed, as well as LLE pain, found to have occlusion of left superficial femoral artery. Pt underwent EGD and AVM cauterizations. He also underwent left fem-pop bypass x2 and was found to have LLE DVT, treated with heparin. He has functional deficits of with his ambulation and his endurance.      Comprehensive rehab program:   -PT/OT/ST-total of 3 hrs/day 5 days/week   - need earlier therapy schedule d/t HD    #PAD:  - Continue Plavix 75mg  - s/p fem-pop bypass and revision  - Completed Keflex 500mg bid for possible superficial infection    #DVT  - left femoral vein DVT  - on Heparin sq tid  - Per chart - patient not a candidate for IVC filter    #UGIB  - egd showed multiple AVMs, cauterized during EGD  - Protonix bid    #CKD:  - on M/F dialysis with extra dialysis intermittently  - Renal consult  - Epoetin Yanick with dialysis    CHF:  - continue Cardizem 240mg daily  - Torsemide d/c by renal  - 4/9   - CXR with Pleural effusion - consider repeat CXR this week    #HTN:  - continue Hydralazine changed to 50mg tid     #DM:- ISS  - FS has been controlled - FS bid     #Pain:  - Cymbalta 60mg daily  - Tylenol PRN    #BPH:  - continue Flomax   - Patient with some spontaneous void     #GI:- Miralax, senna    #Skin  - Bilateral heel pressure ulcers DTI: off-load heels  - Sacrum and buttock decubitus ulcers: were unstageable, starting to heal  - left calf dressing change BID and PRN - medial with suture and lateral with sutures  - staples present at groin and lateral thigh    #Diet  - Mechanical Soft  - Consistent Carb, DASH/TLC, 1500mL fluid restriction, Renal diet    Hospitalist and renal fu noted

## 2021-04-12 NOTE — PROGRESS NOTE ADULT - SUBJECTIVE AND OBJECTIVE BOX
Seen on HD    Vital Signs Last 24 Hrs  T(C): 36.2 (04-12-21 @ 19:00), Max: 36.6 (04-12-21 @ 04:40)  T(F): 97.1 (04-12-21 @ 19:00), Max: 97.9 (04-12-21 @ 04:40)  HR: 58 (04-12-21 @ 19:00) (58 - 68)  BP: 141/65 (04-12-21 @ 19:00) (141/65 - 186/70)  RR: 16 (04-12-21 @ 19:00) (15 - 16)  SpO2: 98% (04-12-21 @ 19:00) (94% - 98%)    s1s2  b/l air entry  soft  + edema b/l LEBARBRA AVF patent                                9.9    7.36  )-----------( 389      ( 12 Apr 2021 05:30 )             30.8     12 Apr 2021 05:30    143    |  105    |  44     ----------------------------<  131    3.8     |  28     |  4.55     Ca    8.7        12 Apr 2021 05:30    acetaminophen   Tablet .. 650 milliGRAM(s) Oral every 6 hours PRN  artificial  tears Solution 1 Drop(s) Both EYES two times a day PRN  clopidogrel Tablet 75 milliGRAM(s) Oral daily  dextrose 40% Gel 15 Gram(s) Oral once  dextrose 5%. 1000 milliLiter(s) IV Continuous <Continuous>  dextrose 5%. 1000 milliLiter(s) IV Continuous <Continuous>  dextrose 50% Injectable 25 Gram(s) IV Push once  dextrose 50% Injectable 12.5 Gram(s) IV Push once  dextrose 50% Injectable 25 Gram(s) IV Push once  diltiazem    milliGRAM(s) Oral daily  DULoxetine 60 milliGRAM(s) Oral daily  epoetin juan-epbx (RETACRIT) Injectable 76009 Unit(s) IV Push <User Schedule>  glucagon  Injectable 1 milliGRAM(s) IntraMuscular once  heparin   Injectable 5000 Unit(s) SubCutaneous every 8 hours  hydrALAZINE 50 milliGRAM(s) Oral three times a day  insulin lispro (ADMELOG) corrective regimen sliding scale   SubCutaneous two times a day with meals  melatonin 6 milliGRAM(s) Oral at bedtime  Nephro-pito 1 Tablet(s) Oral daily  pantoprazole    Tablet 40 milliGRAM(s) Oral two times a day  polyethylene glycol 3350 17 Gram(s) Oral daily  senna 2 Tablet(s) Oral at bedtime  tamsulosin 0.4 milliGRAM(s) Oral at bedtime    A/P:    S/p complicated hospital course as per HPI  ESRD on HD  HD MWF  Epogen w/HD  Renal diet  TMP as able  Rehab    [phone]

## 2021-04-12 NOTE — CHART NOTE - NSCHARTNOTEFT_GEN_A_CORE
Nutrition Follow Up Note  Hospital Course   (Per Electronic Medical Record)    Source:  Patient [X]  Medical Record [X]      Diet:   Consistent Carbohydrate, Renal, DASH-TLC Soft (Dysphagia 3-Soft & Bite Sized) Diet w/ Thin Liquids  Tolerates Diet Consistency Well  No Chewing/Swallowing Difficulties  No Recent Nausea, Vomiting, Diarrhea or Constipation (as Per Patient)  Consumes 50-75% of Meals (as Per Documentation) - States Good PO Intake/Appetite - States Fair Intake (Per Nursing Staff)  on Nepro 8oz TID (Provides 1,275kcal & 57grams of Protein)   Patient Takes Nutrition Supplement   Education Provided on Need for Supplementation     Enteral/Parenteral Nutrition: Not Applicable    Current Weight: 141.6lb on 4/12  Obtain Weights Daily  Obtain New Weight to Confirm     Pertinent Medications: MEDICATIONS  (STANDING):  clopidogrel Tablet 75 milliGRAM(s) Oral daily  dextrose 40% Gel 15 Gram(s) Oral once  dextrose 5%. 1000 milliLiter(s) (50 mL/Hr) IV Continuous <Continuous>  dextrose 5%. 1000 milliLiter(s) (100 mL/Hr) IV Continuous <Continuous>  dextrose 50% Injectable 25 Gram(s) IV Push once  dextrose 50% Injectable 12.5 Gram(s) IV Push once  dextrose 50% Injectable 25 Gram(s) IV Push once  diltiazem    milliGRAM(s) Oral daily  DULoxetine 60 milliGRAM(s) Oral daily  epoetin juan-epbx (RETACRIT) Injectable 10602 Unit(s) IV Push <User Schedule>  glucagon  Injectable 1 milliGRAM(s) IntraMuscular once  heparin   Injectable 5000 Unit(s) SubCutaneous every 8 hours  hydrALAZINE 50 milliGRAM(s) Oral three times a day  insulin lispro (ADMELOG) corrective regimen sliding scale   SubCutaneous two times a day with meals  melatonin 6 milliGRAM(s) Oral at bedtime  Nephro-pito 1 Tablet(s) Oral daily  pantoprazole    Tablet 40 milliGRAM(s) Oral two times a day  polyethylene glycol 3350 17 Gram(s) Oral daily  senna 2 Tablet(s) Oral at bedtime  tamsulosin 0.4 milliGRAM(s) Oral at bedtime    MEDICATIONS  (PRN):  acetaminophen   Tablet .. 650 milliGRAM(s) Oral every 6 hours PRN Temp greater or equal to 38C (100.4F), Mild Pain (1 - 3)  artificial  tears Solution 1 Drop(s) Both EYES two times a day PRN Dry Eyes    Pertinent Labs:  04-12 Na143 mmol/L Glu 131 mg/dL<H> K+ 3.8 mmol/L Cr  4.55 mg/dL<H> BUN 44 mg/dL<H> 04-06 Phos 3.0 mg/dL    Skin: Unstageable Pressure Ulcer on Left Heel    Edema: +1 Edema Noted to Left Lower Extremity   (as Per Documentation)     Last Bowel Movement: on 4/11    Estimated Needs:   [X] No Change Since Previous Assessment    Previous Nutrition Diagnosis:   Moderate Malnutrition     Nutrition Diagnosis is [X] Ongoing - Continues on Nutrition Supplement & Patient Takes Nutrition Supplement & Education Provided on Need for Supplementation     New Nutrition Diagnosis: [X] Not Applicable    Interventions:   1. Education Provided on Need for Supplementation   2. Recommend Continue Nutrition Plan of Care     Monitoring & Evaluation:   [X] Weights   [X] PO Intake   [X] Skin Integrity   [X] Follow Up (Per Protocol)  [X] Tolerance to Diet Prescription   [X] Other: Labs     Registered Dietitian/Nutritionist Remains Available.  Juno Carbajal RDN    Pager # [phone]  Phone# [phone]

## 2021-04-13 LAB
GLUCOSE BLDC GLUCOMTR-MCNC: 100 MG/DL — HIGH (ref 70–99)
GLUCOSE BLDC GLUCOMTR-MCNC: 118 MG/DL — HIGH (ref 70–99)

## 2021-04-13 PROCEDURE — 99232 SBSQ HOSP IP/OBS MODERATE 35: CPT | Mod: GC

## 2021-04-13 PROCEDURE — 99232 SBSQ HOSP IP/OBS MODERATE 35: CPT

## 2021-04-13 RX ORDER — ISOSORBIDE MONONITRATE 60 MG/1
30 TABLET, EXTENDED RELEASE ORAL DAILY
Refills: 0 | Status: DISCONTINUED | OUTPATIENT
Start: 2021-04-13 | End: 2021-04-30

## 2021-04-13 RX ORDER — HYDRALAZINE HCL 50 MG
50 TABLET ORAL THREE TIMES A DAY
Refills: 0 | Status: DISCONTINUED | OUTPATIENT
Start: 2021-04-13 | End: 2021-04-16

## 2021-04-13 RX ORDER — DILTIAZEM HCL 120 MG
240 CAPSULE, EXT RELEASE 24 HR ORAL DAILY
Refills: 0 | Status: DISCONTINUED | OUTPATIENT
Start: 2021-04-13 | End: 2021-04-30

## 2021-04-13 RX ADMIN — Medication 50 MILLIGRAM(S): at 14:21

## 2021-04-13 RX ADMIN — ISOSORBIDE MONONITRATE 30 MILLIGRAM(S): 60 TABLET, EXTENDED RELEASE ORAL at 17:58

## 2021-04-13 RX ADMIN — TAMSULOSIN HYDROCHLORIDE 0.4 MILLIGRAM(S): 0.4 CAPSULE ORAL at 21:43

## 2021-04-13 RX ADMIN — HEPARIN SODIUM 5000 UNIT(S): 5000 INJECTION INTRAVENOUS; SUBCUTANEOUS at 21:43

## 2021-04-13 RX ADMIN — Medication 6 MILLIGRAM(S): at 21:44

## 2021-04-13 RX ADMIN — Medication 50 MILLIGRAM(S): at 07:25

## 2021-04-13 RX ADMIN — HEPARIN SODIUM 5000 UNIT(S): 5000 INJECTION INTRAVENOUS; SUBCUTANEOUS at 14:21

## 2021-04-13 RX ADMIN — HEPARIN SODIUM 5000 UNIT(S): 5000 INJECTION INTRAVENOUS; SUBCUTANEOUS at 11:54

## 2021-04-13 RX ADMIN — CLOPIDOGREL BISULFATE 75 MILLIGRAM(S): 75 TABLET, FILM COATED ORAL at 11:59

## 2021-04-13 RX ADMIN — DULOXETINE HYDROCHLORIDE 60 MILLIGRAM(S): 30 CAPSULE, DELAYED RELEASE ORAL at 11:56

## 2021-04-13 RX ADMIN — SENNA PLUS 2 TABLET(S): 8.6 TABLET ORAL at 21:44

## 2021-04-13 RX ADMIN — PANTOPRAZOLE SODIUM 40 MILLIGRAM(S): 20 TABLET, DELAYED RELEASE ORAL at 11:55

## 2021-04-13 RX ADMIN — POLYETHYLENE GLYCOL 3350 17 GRAM(S): 17 POWDER, FOR SOLUTION ORAL at 11:59

## 2021-04-13 RX ADMIN — Medication 1 TABLET(S): at 11:59

## 2021-04-13 RX ADMIN — Medication 50 MILLIGRAM(S): at 21:45

## 2021-04-13 RX ADMIN — Medication 650 MILLIGRAM(S): at 22:59

## 2021-04-13 RX ADMIN — Medication 650 MILLIGRAM(S): at 23:45

## 2021-04-13 RX ADMIN — PANTOPRAZOLE SODIUM 40 MILLIGRAM(S): 20 TABLET, DELAYED RELEASE ORAL at 17:58

## 2021-04-13 NOTE — PROGRESS NOTE ADULT - SUBJECTIVE AND OBJECTIVE BOX
Patient is a 86y old  Male who presents with a chief complaint of Impairment group -13 - PAD (12 Apr 2021 20:04)      SUBJECTIVE / OVERNIGHT EVENTS:  Pt seen and examined at bedside. No acute events overnight.  Pt denies cp, palpitations, sob, abd pain, N/V, fever, chills.    ROS:  All other review of systems negative    Allergies    Plavix (Hives)  Toprol-XL (Rash)    Intolerances        MEDICATIONS  (STANDING):  clopidogrel Tablet 75 milliGRAM(s) Oral daily  dextrose 40% Gel 15 Gram(s) Oral once  dextrose 5%. 1000 milliLiter(s) (50 mL/Hr) IV Continuous <Continuous>  dextrose 5%. 1000 milliLiter(s) (100 mL/Hr) IV Continuous <Continuous>  dextrose 50% Injectable 25 Gram(s) IV Push once  dextrose 50% Injectable 12.5 Gram(s) IV Push once  dextrose 50% Injectable 25 Gram(s) IV Push once  diltiazem    milliGRAM(s) Oral daily  DULoxetine 60 milliGRAM(s) Oral daily  epoetin juan-epbx (RETACRIT) Injectable 42527 Unit(s) IV Push <User Schedule>  glucagon  Injectable 1 milliGRAM(s) IntraMuscular once  heparin   Injectable 5000 Unit(s) SubCutaneous every 8 hours  hydrALAZINE 50 milliGRAM(s) Oral three times a day  insulin lispro (ADMELOG) corrective regimen sliding scale   SubCutaneous two times a day with meals  melatonin 6 milliGRAM(s) Oral at bedtime  Nephro-pito 1 Tablet(s) Oral daily  pantoprazole    Tablet 40 milliGRAM(s) Oral two times a day  polyethylene glycol 3350 17 Gram(s) Oral daily  senna 2 Tablet(s) Oral at bedtime  tamsulosin 0.4 milliGRAM(s) Oral at bedtime    MEDICATIONS  (PRN):  acetaminophen   Tablet .. 650 milliGRAM(s) Oral every 6 hours PRN Temp greater or equal to 38C (100.4F), Mild Pain (1 - 3)  artificial  tears Solution 1 Drop(s) Both EYES two times a day PRN Dry Eyes      Vital Signs Last 24 Hrs  T(C): 36.7 (13 Apr 2021 08:06), Max: 36.7 (12 Apr 2021 20:47)  T(F): 98 (13 Apr 2021 08:06), Max: 98.1 (12 Apr 2021 20:47)  HR: 60 (13 Apr 2021 08:06) (58 - 64)  BP: 182/46 (13 Apr 2021 08:06) (141/65 - 185/72)  BP(mean): --  RR: 15 (13 Apr 2021 08:06) (15 - 16)  SpO2: 95% (13 Apr 2021 08:06) (95% - 98%)  CAPILLARY BLOOD GLUCOSE      POCT Blood Glucose.: 100 mg/dL (13 Apr 2021 07:20)  POCT Blood Glucose.: 114 mg/dL (12 Apr 2021 19:21)    I&O's Summary    12 Apr 2021 07:01  -  13 Apr 2021 07:00  --------------------------------------------------------  IN: 0 mL / OUT: 3000 mL / NET: -3000 mL        PHYSICAL EXAM:  GENERAL: NAD, Elderly male  HEAD:  Atraumatic, Normocephalic  EYES: EOMI, PERRLA, conjunctiva and sclera clear  CHEST/LUNG: Clear to auscultation bilaterally; No wheeze, nonlabored breathing  HEART: Regular rate and rhythm; No murmurs, rubs, or gallops  ABDOMEN: Soft, Nontender, Nondistended; Bowel sounds present  EXTREMITIES:  2+ Peripheral Pulses, No clubbing, cyanosis. B/L LE 2+ Pitting edema  NEUROLOGY: AAOx3, slightly confused this AM. Non-focal  PSYCH: calm, appropriate mood  SKIN: LUE AVF, LLE Surgical site c/d/i     LABS:                        9.9    7.36  )-----------( 389      ( 12 Apr 2021 05:30 )             30.8     04-12    143  |  105  |  44<H>  ----------------------------<  131<H>  3.8   |  28  |  4.55<H>    Ca    8.7      12 Apr 2021 05:30                RADIOLOGY & ADDITIONAL TESTS:  Results Reviewed:   Imaging Personally Reviewed:  Electrocardiogram Personally Reviewed:    COORDINATION OF CARE:  Care Discussed with Consultants/Other Providers [Y/N]:  Prior or Outpatient Records Reviewed [Y/N]:

## 2021-04-13 NOTE — PROGRESS NOTE ADULT - ASSESSMENT
85 y/o M with extensive PMH including HTN, HLD, CAD, HFpEF, s/p Right CEA for Carotid artery disease, ESRD on HD 2d/week (M/Fri) via LUE fistula, s/p flecainide w/ ILR placed 6/2020, AS s/p TAVR, and PAD (RLE fem-pop bypass October 2020) who presented to [facility] on 3/13/21 with GI bleed, as well as LLE pain due to occulsion of L superficial fem artery.  He had a complicated hospital course and also had acute DVT, PNA, bilateral pleural effusions.  Pt apparently was not a candidate for an IVC filter.  #ESRD on HD  -HD MWF  #PAD  -s/p fem-pop bypass and revision  -Continue Plavix    #Acute left femoral vein DVT  -Not on full dose AC due to GIB and high risk. If H/H remains stable and pt without recurrent episodes, we may restart AC later on  -DVT ppx with HSQ  -Per chart - patient not a candidate for IVC filter??    #Acute blood loss anemia, stable, likely due to UGIB  -EGD showed multiple AVMs, cauterized during EGD  -Protonix 40mg po bid  -Monitor Hg/Hct, transfuse if Hg <8  -EPO with dialysis on M + F    #Chronic diastolic CHF, stable  #Bioprosthetic aortic valve  -TTE done 3/14/2021; reviewed reading  -AV normally functioning  -continue Cardizem 240mg daily    #HTN  -Chronic uncontrolled  -C/w Hydralazine 50mg TID  -Start Imdur w/ holding parameters  -Continue Cardizem   -Monitor vitals    #DM-II, A1c 5.7 3/26  - ISS  - fignersticks q ac and hs    #BPH  -continue Flomax

## 2021-04-13 NOTE — PROGRESS NOTE ADULT - SUBJECTIVE AND OBJECTIVE BOX
Resting    Vital Signs Last 24 Hrs  T(C): 36.7 (04-13-21 @ 08:06), Max: 36.7 (04-12-21 @ 20:47)  T(F): 98 (04-13-21 @ 08:06), Max: 98.1 (04-12-21 @ 20:47)  HR: 67 (04-13-21 @ 14:25) (60 - 67)  BP: 158/66 (04-13-21 @ 14:25) (153/59 - 185/72)  RR: 15 (04-13-21 @ 08:06) (15 - 16)  SpO2: 95% (04-13-21 @ 08:06) (95% - 96%)    s1s2  b/l air entry  soft  + edema b/l BARBRA KIM AVF patent                                       9.9    7.36  )-----------( 389      ( 12 Apr 2021 05:30 )             30.8     12 Apr 2021 05:30    143    |  105    |  44     ----------------------------<  131    3.8     |  28     |  4.55     Ca    8.7        12 Apr 2021 05:30    acetaminophen   Tablet .. 650 milliGRAM(s) Oral every 6 hours PRN  artificial  tears Solution 1 Drop(s) Both EYES two times a day PRN  clopidogrel Tablet 75 milliGRAM(s) Oral daily  dextrose 40% Gel 15 Gram(s) Oral once  dextrose 5%. 1000 milliLiter(s) IV Continuous <Continuous>  dextrose 5%. 1000 milliLiter(s) IV Continuous <Continuous>  dextrose 50% Injectable 25 Gram(s) IV Push once  dextrose 50% Injectable 12.5 Gram(s) IV Push once  dextrose 50% Injectable 25 Gram(s) IV Push once  diltiazem    milliGRAM(s) Oral daily  DULoxetine 60 milliGRAM(s) Oral daily  epoetin juan-epbx (RETACRIT) Injectable 96772 Unit(s) IV Push <User Schedule>  glucagon  Injectable 1 milliGRAM(s) IntraMuscular once  heparin   Injectable 5000 Unit(s) SubCutaneous every 8 hours  hydrALAZINE 50 milliGRAM(s) Oral three times a day  insulin lispro (ADMELOG) corrective regimen sliding scale   SubCutaneous two times a day with meals  isosorbide   mononitrate ER Tablet (IMDUR) 30 milliGRAM(s) Oral daily  melatonin 6 milliGRAM(s) Oral at bedtime  Nephro-pito 1 Tablet(s) Oral daily  pantoprazole    Tablet 40 milliGRAM(s) Oral two times a day  polyethylene glycol 3350 17 Gram(s) Oral daily  senna 2 Tablet(s) Oral at bedtime  tamsulosin 0.4 milliGRAM(s) Oral at bedtime    A/P:    S/p complicated hospital course as per HPI  ESRD on HD  HD MWF  Epogen w/HD  Renal diet  TMP as able  Rehab    [phone]

## 2021-04-13 NOTE — PROGRESS NOTE ADULT - ATTENDING COMMENTS
Chart reviewed. Patient seen at bedside  Breathing appears significantly better compared to admission  Patient does not appear dyspneic  Now reports feeling cold.   Needs maximum encouragement for participation in therapy.   Appetite minimally improved    Team conference today- goals of mod to  min assist   TDD 4/29/21    Discussed with daughter over phone

## 2021-04-13 NOTE — PROGRESS NOTE ADULT - ASSESSMENT
Pt is a 85 y/o M with PMHx of HTN, HLD, CAD, HFpEF, s/p Right CEA for Carotid artery disease, ESRD on HD 2d/week (M/Fri) via LUE fistula, s/p flecainide w/ ILR placed 6/2020, AS s/p TAVR, and PAD who presented to [facility] on 3/13/21 with GI bleed, as well as LLE pain, found to have occlusion of left superficial femoral artery. Pt underwent EGD and AVM cauterizations. He also underwent left fem-pop bypass x2 and was found to have LLE DVT, treated with heparin. He has functional deficits of with his ambulation and his endurance.      Comprehensive rehab program:   -PT/OT/ST-total of 3 hrs/day 5 days/week   - need earlier therapy schedule d/t HD    #PAD:  - Continue Plavix 75mg  - s/p fem-pop bypass and revision  - Completed Keflex 500mg bid for possible superficial infection    #DVT  - left femoral vein DVT  - on Heparin sq tid  - Per chart - patient not a candidate for IVC filter    #UGIB  - egd showed multiple AVMs, cauterized during EGD  - Protonix bid    #CKD:  - on M/F dialysis with extra dialysis intermittently  - Renal consult  - Epoetin Yanick with dialysis    #CHF:  - continue Cardizem 240mg daily  - Torsemide d/c by renal  - 4/9   - CXR with Pleural effusion - consider repeat CXR this week    #HTN:  - continue Hydralazine changed to 50mg tid     #DM:- ISS  - FS has been controlled - FS bid     #Pain:  - Cymbalta 60mg daily  - Tylenol PRN, especially before therapy   - Ice to left knee    #BPH:  - continue Flomax   - Patient with some spontaneous void     #GI:- Miralax, senna    #Skin  - Bilateral heel pressure ulcers DTI: off-load heels  - Sacrum and buttock decubitus ulcers: were unstageable, starting to heal  - left calf dressing change BID and PRN - medial with suture and lateral with sutures  - staples present at groin and lateral thigh    #Diet  - Mechanical Soft  - Consistent Carb, DASH/TLC, 1500mL fluid restriction, Renal diet    Hospitalist and renal fu noted     #Dispo  Team meeting DATE: 4/13   Nursing: continent with 2 person assist  SW: lives with wife who provided supervision in the shower. There are 3 steps to enter the house, 0 inside. Daughters may be able to assist. Daughter trying to avoid LYNNE placement  OT: min A eating, mod A grooming, max A UBD, total A LBD, toileting, transfers. Barriers: endurance. Goals: mod A  PT: mod A sit to stand, 2 person assist pivot, no ambulation or stairs as of yet. Barriers: endurance, participation. Goals: min/mod A  ST: severe cog deficits, fluctuating orientation and memory, will need 24/7 supervision, cannot manage meds or money  EDOD: 4/29

## 2021-04-13 NOTE — PROGRESS NOTE ADULT - SUBJECTIVE AND OBJECTIVE BOX
HPI:  Pt is a 87 y/o M with PMHx of HTN, HLD, CAD, HFpEF, s/p Right CEA for Carotid artery disease, ESRD on HD 2d/week (M/Fri) via LUE fistula, s/p flecainide w/ ILR placed 6/2020, AS s/p TAVR, and PAD (RLE fem-pop bypass October 2020) who presented to [facility] on 3/13/21 with GI bleed, as well as LLE pain. 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 When patient was stable from GI, medical, cardiac standpoint, he went for LLE angiogram on 3/18, and found occlusion of L Superficial femoral artery. He was planned for a LLE fem-pop bypass, and had the bypass performed on 3/20. On 3/22, patient was a RRT for AMS, fever. Work-up revealed pleural effusions and L femoral DVT. He was started on abx for presumed pna and hep gtt for DVT. Patient began to show signs of malperfusion with coolness to touch, mottling of skin to LLE. He was planned for revision of LLE bypass. Went to OR on 3/25 for LLE bypass revision with explant of PTFE bypass, CFA to AT bypass with cryovein performed. Intra-op patient with 1500 ml of blood loss requiring 8 unit PRBC, 4 unit FFP, 1 donor unit plt.  During procedure, pt lost function of PPM with period of asystole requiring transcutaneous pacing. Cardiology consulted and pacemaker interrogated in the OR.  Pt taken to PACU post op and SICU consulted. Patient went to SICU post-op. On 3/27 patient was extubated and was downgraded from SICU to floor the next day. Rest of hospital course was uneventful. He worked with PT, was evaluated by PM&R and approved for acute rehab. The patient remained on heparin sq while in the hospital for tx of dvt (his plavix was held). Upon discharge, approved by CHALINO and Dr. Albrecht to resume plavix. He will remain on heparin sq while at rehab for dvt tx and ppx. He is not a candidate for IVC filter. Upon discharge, pain is well controlled, tolerating diet, remains HD stable, no clinical signs/symptoms of GI bleed, LLE remains warm and well perfused, he has a strong LLE palpable graft pulse and dopplerable signals. Per attending, he is medically stable for discharge to AR and on 4/8/21 he was transferred to [facility] for acute inpatient rehabilitation.    SUBJECTIVE / INTERVAL HPI: Patient seen and examined at bedside. He looks improved today, his breathing is better after having dialysis yesterday. He continues to have difficulty sleeping at night. He continues to have poor participation and motivation in therapy. He denies shortness of breath or nausea. He is complaining of left knee/leg pain near his incision sites.     REVIEW OF SYSTEMS:    CONSTITUTIONAL: No fevers or chills  EYES/ENT: No visual changes;  No vertigo or throat pain   NECK: No pain or stiffness  RESPIRATORY: No cough, wheezing, or shortness of breath today  CARDIOVASCULAR: No chest pain or palpitations  GASTROINTESTINAL: No abdominal or epigastric pain. No nausea or vomiting. No diarrhea or constipation.   GENITOURINARY: No dysuria, frequency or hematuria  NEUROLOGICAL: No numbness, + weakness  SKIN: No itching, rashes, +incisions at left groin and leg, +sacral wounds  MSK: +left knee/leg pain      VITAL SIGNS:  Vital Signs Last 24 Hrs  T(C): 36.7 (13 Apr 2021 08:06), Max: 36.7 (12 Apr 2021 20:47)  T(F): 98 (13 Apr 2021 08:06), Max: 98.1 (12 Apr 2021 20:47)  HR: 60 (13 Apr 2021 08:06) (58 - 64)  BP: 182/46 (13 Apr 2021 08:06) (141/65 - 185/72)  BP(mean): --  RR: 15 (13 Apr 2021 08:06) (15 - 16)  SpO2: 95% (13 Apr 2021 08:06) (95% - 98%)    PHYSICAL EXAM:    General: NAD, comfortably lying in bed  HEENT: NC/AT; PERRL, anicteric sclera; MMM  Neck: supple  Cardiovascular: +S1/S2, RRR  Respiratory: diminished at bases, but lungs sound clear  Gastrointestinal: soft, NT/ND  Extremities: LUE AV fistula with good thrill, bilateral lower extremity edema improving  Neurological: AAOx1, unsure where he is, but when given options he thinks he is still at [place]. He does not know the date; Motor exam is grossly intact at least 4/5 in all extremities. No sensory deficits  Skin: Decubitus bilateral heel pressure ulcers DTI, Left groin and lateral thigh with staples. Left calf dressing with serosanguinous drainage, changed - incisions: lateral (suture) and medial (staples).  Gangrenous toes on left foot. Decubitus sacral and buttock ulcers present- noted at admission     MEDICATIONS:  MEDICATIONS  (STANDING):  clopidogrel Tablet 75 milliGRAM(s) Oral daily  dextrose 40% Gel 15 Gram(s) Oral once  dextrose 5%. 1000 milliLiter(s) (50 mL/Hr) IV Continuous <Continuous>  dextrose 5%. 1000 milliLiter(s) (100 mL/Hr) IV Continuous <Continuous>  dextrose 50% Injectable 25 Gram(s) IV Push once  dextrose 50% Injectable 12.5 Gram(s) IV Push once  dextrose 50% Injectable 25 Gram(s) IV Push once  diltiazem    milliGRAM(s) Oral daily  DULoxetine 60 milliGRAM(s) Oral daily  epoetin juan-epbx (RETACRIT) Injectable 46571 Unit(s) IV Push <User Schedule>  glucagon  Injectable 1 milliGRAM(s) IntraMuscular once  heparin   Injectable 5000 Unit(s) SubCutaneous every 8 hours  hydrALAZINE 50 milliGRAM(s) Oral three times a day  insulin lispro (ADMELOG) corrective regimen sliding scale   SubCutaneous two times a day with meals  isosorbide   mononitrate ER Tablet (IMDUR) 30 milliGRAM(s) Oral daily  melatonin 6 milliGRAM(s) Oral at bedtime  Nephro-pito 1 Tablet(s) Oral daily  pantoprazole    Tablet 40 milliGRAM(s) Oral two times a day  polyethylene glycol 3350 17 Gram(s) Oral daily  senna 2 Tablet(s) Oral at bedtime  tamsulosin 0.4 milliGRAM(s) Oral at bedtime    MEDICATIONS  (PRN):  acetaminophen   Tablet .. 650 milliGRAM(s) Oral every 6 hours PRN Temp greater or equal to 38C (100.4F), Mild Pain (1 - 3)  artificial  tears Solution 1 Drop(s) Both EYES two times a day PRN Dry Eyes      ALLERGIES:  Allergies    Plavix (Hives)  Toprol-XL (Rash)    Intolerances        LABS:                        9.9    7.36  )-----------( 389      ( 12 Apr 2021 05:30 )             30.8     04-12    143  |  105  |  44<H>  ----------------------------<  131<H>  3.8   |  28  |  4.55<H>    Ca    8.7      12 Apr 2021 05:30          CAPILLARY BLOOD GLUCOSE      POCT Blood Glucose.: 100 mg/dL (13 Apr 2021 07:20)      RADIOLOGY & ADDITIONAL TESTS: Reviewed. HPI:  Pt is a 87 y/o M with PMHx of HTN, HLD, CAD, HFpEF, s/p Right CEA for Carotid artery disease, ESRD on HD 2d/week (M/Fri) via LUE fistula, s/p flecainide w/ ILR placed 6/2020, AS s/p TAVR, and PAD (RLE fem-pop bypass October 2020) who presented to [facility] on 3/13/21 with GI bleed, as well as LLE pain. 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 When patient was stable from GI, medical, cardiac standpoint, he went for LLE angiogram on 3/18, and found occlusion of L Superficial femoral artery. He was planned for a LLE fem-pop bypass, and had the bypass performed on 3/20. On 3/22, patient was a RRT for AMS, fever. Work-up revealed pleural effusions and L femoral DVT. He was started on abx for presumed pna and hep gtt for DVT. Patient began to show signs of malperfusion with coolness to touch, mottling of skin to LLE. He was planned for revision of LLE bypass. Went to OR on 3/25 for LLE bypass revision with explant of PTFE bypass, CFA to AT bypass with cryovein performed. Intra-op patient with 1500 ml of blood loss requiring 8 unit PRBC, 4 unit FFP, 1 donor unit plt.  During procedure, pt lost function of PPM with period of asystole requiring transcutaneous pacing. Cardiology consulted and pacemaker interrogated in the OR.  Pt taken to PACU post op and SICU consulted. Patient went to SICU post-op. On 3/27 patient was extubated and was downgraded from SICU to floor the next day. Rest of hospital course was uneventful. He worked with PT, was evaluated by PM&R and approved for acute rehab. The patient remained on heparin sq while in the hospital for tx of dvt (his plavix was held). Upon discharge, approved by CHALINO and Dr. Albrecht to resume plavix. He will remain on heparin sq while at rehab for dvt tx and ppx. He is not a candidate for IVC filter. Upon discharge, pain is well controlled, tolerating diet, remains HD stable, no clinical signs/symptoms of GI bleed, LLE remains warm and well perfused, he has a strong LLE palpable graft pulse and dopplerable signals. Per attending, he is medically stable for discharge to AR and on 4/8/21 he was transferred to [facility] for acute inpatient rehabilitation.    SUBJECTIVE / INTERVAL HPI: Patient seen and examined at bedside. He looks improved today, his breathing is better after having dialysis yesterday. He continues to have difficulty sleeping at night. He continues to have poor participation and motivation in therapy. He denies shortness of breath or nausea. He is complaining of left knee/leg pain near his incision sites.     REVIEW OF SYSTEMS:    CONSTITUTIONAL: No fevers or chills  EYES/ENT: No visual changes;    NECK: No pain or stiffness  RESPIRATORY: No cough, wheezing, or shortness of breath today  CARDIOVASCULAR: No chest pain or palpitations  GASTROINTESTINAL: No abdominal or epigastric pain. No nausea or vomiting. No diarrhea or constipation.   GENITOURINARY: No dysuria, frequency or hematuria  NEUROLOGICAL: No numbness, + weakness  SKIN: No itching, rashes, +incisions at left groin and leg, +sacral wounds  MSK: +left knee/leg pain      VITAL SIGNS:  Vital Signs Last 24 Hrs  T(C): 36.7 (13 Apr 2021 08:06), Max: 36.7 (12 Apr 2021 20:47)  T(F): 98 (13 Apr 2021 08:06), Max: 98.1 (12 Apr 2021 20:47)  HR: 60 (13 Apr 2021 08:06) (58 - 64)  BP: 182/46 (13 Apr 2021 08:06) (141/65 - 185/72)  BP(mean): --  RR: 15 (13 Apr 2021 08:06) (15 - 16)  SpO2: 95% (13 Apr 2021 08:06) (95% - 98%)    PHYSICAL EXAM:    General: NAD, comfortably lying in bed  Cardiovascular: +S1/S2, RRR  Respiratory: diminished at bases, but lungs sound clear  Gastrointestinal: soft, NT/ND  Extremities: LUE AV fistula with good thrill, bilateral lower extremity edema improving  Neurological: AAOx1, unsure where he is, but when given options he thinks he is still at [place]. He does not know the date; Motor exam is grossly intact at least 4/5 in all extremities. No sensory deficits  Skin: Decubitus bilateral heel pressure ulcers DTI, Left groin and lateral thigh with staples. Left calf dressing with serosanguinous drainage, changed - incisions: lateral (suture) and medial (staples).  Gangrenous toes on left foot. Decubitus sacral and buttock ulcers present- noted at admission     MEDICATIONS:  MEDICATIONS  (STANDING):  clopidogrel Tablet 75 milliGRAM(s) Oral daily  dextrose 40% Gel 15 Gram(s) Oral once  dextrose 5%. 1000 milliLiter(s) (50 mL/Hr) IV Continuous <Continuous>  dextrose 5%. 1000 milliLiter(s) (100 mL/Hr) IV Continuous <Continuous>  dextrose 50% Injectable 25 Gram(s) IV Push once  dextrose 50% Injectable 12.5 Gram(s) IV Push once  dextrose 50% Injectable 25 Gram(s) IV Push once  diltiazem    milliGRAM(s) Oral daily  DULoxetine 60 milliGRAM(s) Oral daily  epoetin juan-epbx (RETACRIT) Injectable 05459 Unit(s) IV Push <User Schedule>  glucagon  Injectable 1 milliGRAM(s) IntraMuscular once  heparin   Injectable 5000 Unit(s) SubCutaneous every 8 hours  hydrALAZINE 50 milliGRAM(s) Oral three times a day  insulin lispro (ADMELOG) corrective regimen sliding scale   SubCutaneous two times a day with meals  isosorbide   mononitrate ER Tablet (IMDUR) 30 milliGRAM(s) Oral daily  melatonin 6 milliGRAM(s) Oral at bedtime  Nephro-pito 1 Tablet(s) Oral daily  pantoprazole    Tablet 40 milliGRAM(s) Oral two times a day  polyethylene glycol 3350 17 Gram(s) Oral daily  senna 2 Tablet(s) Oral at bedtime  tamsulosin 0.4 milliGRAM(s) Oral at bedtime    MEDICATIONS  (PRN):  acetaminophen   Tablet .. 650 milliGRAM(s) Oral every 6 hours PRN Temp greater or equal to 38C (100.4F), Mild Pain (1 - 3)  artificial  tears Solution 1 Drop(s) Both EYES two times a day PRN Dry Eyes      ALLERGIES:  Allergies    Plavix (Hives)  Toprol-XL (Rash)    Intolerances        LABS:                        9.9    7.36  )-----------( 389      ( 12 Apr 2021 05:30 )             30.8     04-12    143  |  105  |  44<H>  ----------------------------<  131<H>  3.8   |  28  |  4.55<H>    Ca    8.7      12 Apr 2021 05:30          CAPILLARY BLOOD GLUCOSE      POCT Blood Glucose.: 100 mg/dL (13 Apr 2021 07:20)      RADIOLOGY & ADDITIONAL TESTS: Reviewed.

## 2021-04-14 LAB
ANION GAP SERPL CALC-SCNC: 8 MMOL/L — SIGNIFICANT CHANGE UP (ref 5–17)
BUN SERPL-MCNC: 40 MG/DL — HIGH (ref 7–23)
CALCIUM SERPL-MCNC: 8.9 MG/DL — SIGNIFICANT CHANGE UP (ref 8.4–10.5)
CHLORIDE SERPL-SCNC: 100 MMOL/L — SIGNIFICANT CHANGE UP (ref 96–108)
CO2 SERPL-SCNC: 28 MMOL/L — SIGNIFICANT CHANGE UP (ref 22–31)
CREAT SERPL-MCNC: 4.4 MG/DL — HIGH (ref 0.5–1.3)
GLUCOSE BLDC GLUCOMTR-MCNC: 108 MG/DL — HIGH (ref 70–99)
GLUCOSE BLDC GLUCOMTR-MCNC: 121 MG/DL — HIGH (ref 70–99)
GLUCOSE SERPL-MCNC: 153 MG/DL — HIGH (ref 70–99)
HCT VFR BLD CALC: 30.5 % — LOW (ref 39–50)
HGB BLD-MCNC: 9.8 G/DL — LOW (ref 13–17)
MCHC RBC-ENTMCNC: 30.2 PG — SIGNIFICANT CHANGE UP (ref 27–34)
MCHC RBC-ENTMCNC: 32.1 GM/DL — SIGNIFICANT CHANGE UP (ref 32–36)
MCV RBC AUTO: 94.1 FL — SIGNIFICANT CHANGE UP (ref 80–100)
NRBC # BLD: 0 /100 WBCS — SIGNIFICANT CHANGE UP (ref 0–0)
PLATELET # BLD AUTO: 343 K/UL — SIGNIFICANT CHANGE UP (ref 150–400)
POTASSIUM SERPL-MCNC: 4.3 MMOL/L — SIGNIFICANT CHANGE UP (ref 3.5–5.3)
POTASSIUM SERPL-SCNC: 4.3 MMOL/L — SIGNIFICANT CHANGE UP (ref 3.5–5.3)
RBC # BLD: 3.24 M/UL — LOW (ref 4.2–5.8)
RBC # FLD: 17.1 % — HIGH (ref 10.3–14.5)
SODIUM SERPL-SCNC: 136 MMOL/L — SIGNIFICANT CHANGE UP (ref 135–145)
WBC # BLD: 6.54 K/UL — SIGNIFICANT CHANGE UP (ref 3.8–10.5)
WBC # FLD AUTO: 6.54 K/UL — SIGNIFICANT CHANGE UP (ref 3.8–10.5)

## 2021-04-14 PROCEDURE — 99232 SBSQ HOSP IP/OBS MODERATE 35: CPT

## 2021-04-14 RX ADMIN — Medication 1 TABLET(S): at 11:51

## 2021-04-14 RX ADMIN — TAMSULOSIN HYDROCHLORIDE 0.4 MILLIGRAM(S): 0.4 CAPSULE ORAL at 22:04

## 2021-04-14 RX ADMIN — HEPARIN SODIUM 5000 UNIT(S): 5000 INJECTION INTRAVENOUS; SUBCUTANEOUS at 13:52

## 2021-04-14 RX ADMIN — Medication 6 MILLIGRAM(S): at 22:06

## 2021-04-14 RX ADMIN — DULOXETINE HYDROCHLORIDE 60 MILLIGRAM(S): 30 CAPSULE, DELAYED RELEASE ORAL at 11:51

## 2021-04-14 RX ADMIN — Medication 240 MILLIGRAM(S): at 06:25

## 2021-04-14 RX ADMIN — SENNA PLUS 2 TABLET(S): 8.6 TABLET ORAL at 22:04

## 2021-04-14 RX ADMIN — Medication 50 MILLIGRAM(S): at 22:04

## 2021-04-14 RX ADMIN — Medication 650 MILLIGRAM(S): at 09:15

## 2021-04-14 RX ADMIN — HEPARIN SODIUM 5000 UNIT(S): 5000 INJECTION INTRAVENOUS; SUBCUTANEOUS at 22:04

## 2021-04-14 RX ADMIN — POLYETHYLENE GLYCOL 3350 17 GRAM(S): 17 POWDER, FOR SOLUTION ORAL at 11:51

## 2021-04-14 RX ADMIN — HEPARIN SODIUM 5000 UNIT(S): 5000 INJECTION INTRAVENOUS; SUBCUTANEOUS at 06:24

## 2021-04-14 RX ADMIN — Medication 650 MILLIGRAM(S): at 09:45

## 2021-04-14 RX ADMIN — PANTOPRAZOLE SODIUM 40 MILLIGRAM(S): 20 TABLET, DELAYED RELEASE ORAL at 06:25

## 2021-04-14 RX ADMIN — PANTOPRAZOLE SODIUM 40 MILLIGRAM(S): 20 TABLET, DELAYED RELEASE ORAL at 18:42

## 2021-04-14 RX ADMIN — CLOPIDOGREL BISULFATE 75 MILLIGRAM(S): 75 TABLET, FILM COATED ORAL at 11:51

## 2021-04-14 RX ADMIN — Medication 50 MILLIGRAM(S): at 06:25

## 2021-04-14 RX ADMIN — Medication 50 MILLIGRAM(S): at 13:52

## 2021-04-14 RX ADMIN — ISOSORBIDE MONONITRATE 30 MILLIGRAM(S): 60 TABLET, EXTENDED RELEASE ORAL at 12:23

## 2021-04-14 NOTE — PROGRESS NOTE ADULT - SUBJECTIVE AND OBJECTIVE BOX
Resting    Vital Signs Last 24 Hrs  T(C): 37.2 (04-14-21 @ 20:44), Max: 37.2 (04-14-21 @ 20:44)  T(F): 99 (04-14-21 @ 20:44), Max: 99 (04-14-21 @ 20:44)  HR: 66 (04-14-21 @ 20:44) (60 - 67)  BP: 157/62 (04-14-21 @ 22:02) (127/54 - 158/51)  RR: 17 (04-14-21 @ 20:44) (16 - 18)  SpO2: 95% (04-14-21 @ 20:44) (95% - 99%)    s1s2  b/l air entry  soft  + edema b/l LEBARBRA AVF patent                                               9.8    6.54  )-----------( 343      ( 14 Apr 2021 15:19 )             30.5     14 Apr 2021 15:19    136    |  100    |  40     ----------------------------<  153    4.3     |  28     |  4.40     Ca    8.9        14 Apr 2021 15:19    acetaminophen   Tablet .. 650 milliGRAM(s) Oral every 6 hours PRN  artificial  tears Solution 1 Drop(s) Both EYES two times a day PRN  clopidogrel Tablet 75 milliGRAM(s) Oral daily  dextrose 40% Gel 15 Gram(s) Oral once  dextrose 5%. 1000 milliLiter(s) IV Continuous <Continuous>  dextrose 5%. 1000 milliLiter(s) IV Continuous <Continuous>  dextrose 50% Injectable 25 Gram(s) IV Push once  dextrose 50% Injectable 12.5 Gram(s) IV Push once  dextrose 50% Injectable 25 Gram(s) IV Push once  diltiazem    milliGRAM(s) Oral daily  DULoxetine 60 milliGRAM(s) Oral daily  epoetin juan-epbx (RETACRIT) Injectable 17163 Unit(s) IV Push <User Schedule>  glucagon  Injectable 1 milliGRAM(s) IntraMuscular once  heparin   Injectable 5000 Unit(s) SubCutaneous every 8 hours  hydrALAZINE 50 milliGRAM(s) Oral three times a day  insulin lispro (ADMELOG) corrective regimen sliding scale   SubCutaneous two times a day with meals  isosorbide   mononitrate ER Tablet (IMDUR) 30 milliGRAM(s) Oral daily  melatonin 6 milliGRAM(s) Oral at bedtime  Nephro-pito 1 Tablet(s) Oral daily  pantoprazole    Tablet 40 milliGRAM(s) Oral two times a day  polyethylene glycol 3350 17 Gram(s) Oral daily  senna 2 Tablet(s) Oral at bedtime  tamsulosin 0.4 milliGRAM(s) Oral at bedtime    A/P:    S/p complicated hospital course as per HPI  ESRD on HD  HD MWF  Epogen w/HD  Renal diet  TMP as able  Rehab    [phone]

## 2021-04-14 NOTE — PROGRESS NOTE ADULT - SUBJECTIVE AND OBJECTIVE BOX
HPI:  Pt is a 85 y/o M with PMHx of HTN, HLD, CAD, HFpEF, s/p Right CEA for Carotid artery disease, ESRD on HD 2d/week (/Fri) via LUE fistula, s/p flecainide w/ ILR placed 2020, AS s/p TAVR, and PAD (RLE fem-pop bypass 2020) who presented to [facility] on 3/13/21 with GI bleed, as well as LLE pain. 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 When patient was stable from GI, medical, cardiac standpoint, he went for LLE angiogram on 3/18, and found occlusion of L Superficial femoral artery. He was planned for a LLE fem-pop bypass, and had the bypass performed on 3/20. On 3/22, patient was a RRT for AMS, fever. Work-up revealed pleural effusions and L femoral DVT. He was started on abx for presumed pna and hep gtt for DVT. Patient began to show signs of malperfusion with coolness to touch, mottling of skin to LLE. He was planned for revision of LLE bypass. Went to OR on 3/25 for LLE bypass revision with explant of PTFE bypass, CFA to AT bypass with cryovein performed. Intra-op patient with 1500 ml of blood loss requiring 8 unit PRBC, 4 unit FFP, 1 donor unit plt.  During procedure, pt lost function of PPM with period of asystole requiring transcutaneous pacing. Cardiology consulted and pacemaker interrogated in the OR.  Pt taken to PACU post op and SICU consulted. Patient went to SICU post-op. On 3/27 patient was extubated and was downgraded from SICU to floor the next day. Rest of hospital course was uneventful. He worked with PT, was evaluated by PM&R and approved for acute rehab. The patient remained on heparin sq while in the hospital for tx of dvt (his plavix was held). Upon discharge, approved by CHALINO and Dr. Albrecht to resume plavix. He will remain on heparin sq while at rehab for dvt tx and ppx. He is not a candidate for IVC filter. Upon discharge, pain is well controlled, tolerating diet, remains HD stable, no clinical signs/symptoms of GI bleed, LLE remains warm and well perfused, he has a strong LLE palpable graft pulse and dopplerable signals. Per attending, he is medically stable for discharge to AR and on 21 he was transferred to [facility] for acute inpatient rehabilitation.    SUBJECTIVE / INTERVAL HPI: Patient seen and examined at bedside.   Denies pain.   He reports good sleep, but napping during day time  Needs maximum encouragement, but able to work with OT this am with dressing activity  Daughter does not want patient to be dialysed today- Requested renal for recommendations     REVIEW OF SYSTEMS:    CONSTITUTIONAL: No fevers or chills  EYES/ENT: No visual changes;    NECK: No pain or stiffness  RESPIRATORY: No cough,  CARDIOVASCULAR: No chest pain or palpitations  GASTROINTESTINAL: No abdominal or epigastric pain.  GENITOURINARY: No dysuria, frequency or hematuria  NEUROLOGICAL: No numbness, + weakness  SKIN: +incisions at left groin and leg,  MSK: +left knee/leg pain    Vital Signs Last 24 Hrs  T(C): 37 (2021 09:11), Max: 37 (2021 09:11)  T(F): 98.6 (2021 09:11), Max: 98.6 (2021 09:11)  HR: 67 (2021 09:11) (64 - 67)  BP: 147/54 (2021 09:11) (147/54 - 167/68)  BP(mean): --  RR: 16 (2021 09:11) (16 - 16)  SpO2: 98% (2021 09:11) (95% - 98%)  Daily     Daily Weight in k.2 (2021 20:15)      PHYSICAL EXAM:    General: NAD, comfortably lying in bed, seated in chair later this morning   Cardiovascular: +S1/S2, RRR  Respiratory: diminished at bases, but lungs sound clear  Gastrointestinal: soft, NT/ND  Extremities: LUE AV fistula + bilateral lower extremity edema improving  Neurological: AAOx1-2 needs cues for time He does not know the date; Motor exam is grossly intact at least 4/5 in all extremities.  Skin: Decubitus bilateral heel pressure ulcers DTI, Left groin and lateral thigh with staples. Left calf dressing with serosanguinous drainage, changed - incisions: lateral (suture) and medial (staples).  Gangrenous toes on left foot. Decubitus sacral and buttock ulcers present- unstageable      Function:  mod assist with UE dressing  Transfers: mod assist  Sit to stand: mod assist       MEDICATIONS  (STANDING):  clopidogrel Tablet 75 milliGRAM(s) Oral daily  dextrose 40% Gel 15 Gram(s) Oral once  dextrose 5%. 1000 milliLiter(s) (50 mL/Hr) IV Continuous <Continuous>  dextrose 5%. 1000 milliLiter(s) (100 mL/Hr) IV Continuous <Continuous>  dextrose 50% Injectable 25 Gram(s) IV Push once  dextrose 50% Injectable 12.5 Gram(s) IV Push once  dextrose 50% Injectable 25 Gram(s) IV Push once  diltiazem    milliGRAM(s) Oral daily  DULoxetine 60 milliGRAM(s) Oral daily  epoetin juan-epbx (RETACRIT) Injectable 84464 Unit(s) IV Push <User Schedule>  glucagon  Injectable 1 milliGRAM(s) IntraMuscular once  heparin   Injectable 5000 Unit(s) SubCutaneous every 8 hours  hydrALAZINE 50 milliGRAM(s) Oral three times a day  insulin lispro (ADMELOG) corrective regimen sliding scale   SubCutaneous two times a day with meals  isosorbide   mononitrate ER Tablet (IMDUR) 30 milliGRAM(s) Oral daily  melatonin 6 milliGRAM(s) Oral at bedtime  Nephro-pito 1 Tablet(s) Oral daily  pantoprazole    Tablet 40 milliGRAM(s) Oral two times a day  polyethylene glycol 3350 17 Gram(s) Oral daily  senna 2 Tablet(s) Oral at bedtime  tamsulosin 0.4 milliGRAM(s) Oral at bedtime    MEDICATIONS  (PRN):  acetaminophen   Tablet .. 650 milliGRAM(s) Oral every 6 hours PRN Temp greater or equal to 38C (100.4F), Mild Pain (1 - 3)  artificial  tears Solution 1 Drop(s) Both EYES two times a day PRN Dry Eyes        LABS:                        9.9    7.36  )-----------( 389      ( 2021 05:30 )             30.8     04-12    143  |  105  |  44<H>  ----------------------------<  131<H>  3.8   |  28  |  4.55<H>    Ca    8.7      2021 05:30    CAPILLARY BLOOD GLUCOSE      POCT Blood Glucose.: 121 mg/dL (2021 08:03)  POCT Blood Glucose.: 118 mg/dL (2021 16:44)

## 2021-04-14 NOTE — PROGRESS NOTE ADULT - ASSESSMENT
Pt is a 85 y/o M with PMHx of HTN, HLD, CAD, HFpEF, s/p Right CEA for Carotid artery disease, ESRD on HD 2d/week (M/Fri) via LUE fistula, s/p flecainide w/ ILR placed 6/2020, AS s/p TAVR, and PAD who presented to [facility] on 3/13/21 with GI bleed, as well as LLE pain, found to have occlusion of left superficial femoral artery. Pt underwent EGD and AVM cauterizations. He also underwent left fem-pop bypass x2 and was found to have LLE DVT, treated with heparin. He has functional deficits of with his ambulation and his endurance.      Comprehensive rehab program: -PT/OT/ST-total of 3 hrs/day 5 days/week     #PAD:  - Continue Plavix 75mg  - s/p fem-pop bypass and revision  - Completed Keflex 500mg bid for possible superficial infection    #DVT  - left femoral vein DVT  - on Heparin sq tid  - Per chart - patient not a candidate for IVC filter    #UGIB:- egd showed multiple AVMs, cauterized during EGD  - Protonix bid    #CKD:- on M/F dialysis with extra dialysis intermittently  - Renal consult  - Epoetin Yanick with dialysis    #CHF:- continue Cardizem 240mg daily  - Torsemide d/c by renal  - 4/9   - CXR with Pleural effusion - repeat CXR this week    #HTN:- continue Hydralazine changed to 50mg tid     #DM:- ISS  - FS has been controlled - FS bid     #Pain:- Cymbalta 60mg daily, - Tylenol PRN, especially before therapy , - Ice to left knee    #BPH:- continue Flomax   - Patient with some spontaneous void     #GI:- Miralax, senna    #Skin  - Bilateral heel pressure ulcers DTI: off-load heels  - Sacrum and buttock decubitus ulcers: were unstageable, starting to heal  - left calf dressing change BID and PRN - medial with suture and lateral with sutures  - staples present at groin and lateral thigh    #Diet:- Mechanical Soft  - Consistent Carb, DASH/TLC, 1500mL fluid restriction, Renal diet    Hospitalist and renal fu noted     #Dispo  Team meeting DATE: 4/13   Nursing: continent with 2 person assist  SW: lives with wife who provided supervision in the shower. There are 3 steps to enter the house, 0 inside. Daughters may be able to assist. Daughter trying to avoid LYNNE placement  OT: min A eating, mod A grooming, max A UBD, total A LBD, toileting, transfers. Barriers: endurance. Goals: mod A  PT: mod A sit to stand, 2 person assist pivot, no ambulation or stairs as of yet. Barriers: endurance, participation. Goals: min/mod A  ST: severe cog deficits, fluctuating orientation and memory, will need 24/7 supervision, cannot manage meds or money  EDOD: 4/29      Hospitalist and renal fu  noted.    Daughter updated over status    I did P2 P with Dr. Adam, yesterday, 4/13 for IRF coverage, Stay denied and I have requested for appeal. Family notified

## 2021-04-15 LAB
ANION GAP SERPL CALC-SCNC: 6 MMOL/L — SIGNIFICANT CHANGE UP (ref 5–17)
BUN SERPL-MCNC: 25 MG/DL — HIGH (ref 7–23)
CALCIUM SERPL-MCNC: 8.9 MG/DL — SIGNIFICANT CHANGE UP (ref 8.4–10.5)
CHLORIDE SERPL-SCNC: 100 MMOL/L — SIGNIFICANT CHANGE UP (ref 96–108)
CO2 SERPL-SCNC: 30 MMOL/L — SIGNIFICANT CHANGE UP (ref 22–31)
CREAT SERPL-MCNC: 3.31 MG/DL — HIGH (ref 0.5–1.3)
GLUCOSE BLDC GLUCOMTR-MCNC: 130 MG/DL — HIGH (ref 70–99)
GLUCOSE BLDC GLUCOMTR-MCNC: 183 MG/DL — HIGH (ref 70–99)
GLUCOSE SERPL-MCNC: 120 MG/DL — HIGH (ref 70–99)
HCT VFR BLD CALC: 34.8 % — LOW (ref 39–50)
HGB BLD-MCNC: 11.2 G/DL — LOW (ref 13–17)
MCHC RBC-ENTMCNC: 30 PG — SIGNIFICANT CHANGE UP (ref 27–34)
MCHC RBC-ENTMCNC: 32.2 GM/DL — SIGNIFICANT CHANGE UP (ref 32–36)
MCV RBC AUTO: 93.3 FL — SIGNIFICANT CHANGE UP (ref 80–100)
NRBC # BLD: 0 /100 WBCS — SIGNIFICANT CHANGE UP (ref 0–0)
PLATELET # BLD AUTO: 298 K/UL — SIGNIFICANT CHANGE UP (ref 150–400)
POTASSIUM SERPL-MCNC: 4 MMOL/L — SIGNIFICANT CHANGE UP (ref 3.5–5.3)
POTASSIUM SERPL-SCNC: 4 MMOL/L — SIGNIFICANT CHANGE UP (ref 3.5–5.3)
RBC # BLD: 3.73 M/UL — LOW (ref 4.2–5.8)
RBC # FLD: 17.2 % — HIGH (ref 10.3–14.5)
SODIUM SERPL-SCNC: 136 MMOL/L — SIGNIFICANT CHANGE UP (ref 135–145)
WBC # BLD: 5.92 K/UL — SIGNIFICANT CHANGE UP (ref 3.8–10.5)
WBC # FLD AUTO: 5.92 K/UL — SIGNIFICANT CHANGE UP (ref 3.8–10.5)

## 2021-04-15 PROCEDURE — 99232 SBSQ HOSP IP/OBS MODERATE 35: CPT | Mod: GC

## 2021-04-15 PROCEDURE — 99232 SBSQ HOSP IP/OBS MODERATE 35: CPT

## 2021-04-15 RX ADMIN — TAMSULOSIN HYDROCHLORIDE 0.4 MILLIGRAM(S): 0.4 CAPSULE ORAL at 21:26

## 2021-04-15 RX ADMIN — Medication 6 MILLIGRAM(S): at 21:26

## 2021-04-15 RX ADMIN — HEPARIN SODIUM 5000 UNIT(S): 5000 INJECTION INTRAVENOUS; SUBCUTANEOUS at 13:34

## 2021-04-15 RX ADMIN — HEPARIN SODIUM 5000 UNIT(S): 5000 INJECTION INTRAVENOUS; SUBCUTANEOUS at 21:26

## 2021-04-15 RX ADMIN — Medication 1 TABLET(S): at 12:23

## 2021-04-15 RX ADMIN — Medication 50 MILLIGRAM(S): at 21:26

## 2021-04-15 RX ADMIN — DULOXETINE HYDROCHLORIDE 60 MILLIGRAM(S): 30 CAPSULE, DELAYED RELEASE ORAL at 12:23

## 2021-04-15 RX ADMIN — Medication 2: at 17:08

## 2021-04-15 RX ADMIN — Medication 650 MILLIGRAM(S): at 20:42

## 2021-04-15 RX ADMIN — Medication 50 MILLIGRAM(S): at 05:52

## 2021-04-15 RX ADMIN — HEPARIN SODIUM 5000 UNIT(S): 5000 INJECTION INTRAVENOUS; SUBCUTANEOUS at 05:51

## 2021-04-15 RX ADMIN — Medication 650 MILLIGRAM(S): at 21:32

## 2021-04-15 RX ADMIN — Medication 240 MILLIGRAM(S): at 05:51

## 2021-04-15 RX ADMIN — CLOPIDOGREL BISULFATE 75 MILLIGRAM(S): 75 TABLET, FILM COATED ORAL at 12:23

## 2021-04-15 RX ADMIN — Medication 50 MILLIGRAM(S): at 13:34

## 2021-04-15 RX ADMIN — PANTOPRAZOLE SODIUM 40 MILLIGRAM(S): 20 TABLET, DELAYED RELEASE ORAL at 17:07

## 2021-04-15 RX ADMIN — PANTOPRAZOLE SODIUM 40 MILLIGRAM(S): 20 TABLET, DELAYED RELEASE ORAL at 05:52

## 2021-04-15 RX ADMIN — SENNA PLUS 2 TABLET(S): 8.6 TABLET ORAL at 21:26

## 2021-04-15 NOTE — PROGRESS NOTE ADULT - SUBJECTIVE AND OBJECTIVE BOX
Patient is a 86y old  Male who presents with a chief complaint of Impairment group -13 - PAD (14 Apr 2021 22:42)      SUBJECTIVE / OVERNIGHT EVENTS:  Pt seen and examined at bedside. No acute events overnight.  Pt denies cp, palpitations, sob, abd pain, N/V, fever, chills.    ROS:  All other review of systems negative    Allergies    Plavix (Hives)  Toprol-XL (Rash)    Intolerances        MEDICATIONS  (STANDING):  clopidogrel Tablet 75 milliGRAM(s) Oral daily  dextrose 40% Gel 15 Gram(s) Oral once  dextrose 5%. 1000 milliLiter(s) (50 mL/Hr) IV Continuous <Continuous>  dextrose 5%. 1000 milliLiter(s) (100 mL/Hr) IV Continuous <Continuous>  dextrose 50% Injectable 25 Gram(s) IV Push once  dextrose 50% Injectable 12.5 Gram(s) IV Push once  dextrose 50% Injectable 25 Gram(s) IV Push once  diltiazem    milliGRAM(s) Oral daily  DULoxetine 60 milliGRAM(s) Oral daily  epoetin juan-epbx (RETACRIT) Injectable 43477 Unit(s) IV Push <User Schedule>  glucagon  Injectable 1 milliGRAM(s) IntraMuscular once  heparin   Injectable 5000 Unit(s) SubCutaneous every 8 hours  hydrALAZINE 50 milliGRAM(s) Oral three times a day  insulin lispro (ADMELOG) corrective regimen sliding scale   SubCutaneous two times a day with meals  isosorbide   mononitrate ER Tablet (IMDUR) 30 milliGRAM(s) Oral daily  melatonin 6 milliGRAM(s) Oral at bedtime  Nephro-pito 1 Tablet(s) Oral daily  pantoprazole    Tablet 40 milliGRAM(s) Oral two times a day  polyethylene glycol 3350 17 Gram(s) Oral daily  senna 2 Tablet(s) Oral at bedtime  tamsulosin 0.4 milliGRAM(s) Oral at bedtime    MEDICATIONS  (PRN):  acetaminophen   Tablet .. 650 milliGRAM(s) Oral every 6 hours PRN Temp greater or equal to 38C (100.4F), Mild Pain (1 - 3)  artificial  tears Solution 1 Drop(s) Both EYES two times a day PRN Dry Eyes      Vital Signs Last 24 Hrs  T(C): 36.4 (15 Apr 2021 08:37), Max: 37.2 (14 Apr 2021 20:44)  T(F): 97.6 (15 Apr 2021 08:37), Max: 99 (14 Apr 2021 20:44)  HR: 80 (15 Apr 2021 08:37) (60 - 81)  BP: 139/54 (15 Apr 2021 08:37) (127/54 - 188/66)  BP(mean): --  RR: 17 (15 Apr 2021 08:37) (16 - 18)  SpO2: 98% (15 Apr 2021 08:37) (95% - 99%)  CAPILLARY BLOOD GLUCOSE      POCT Blood Glucose.: 130 mg/dL (15 Apr 2021 08:02)  POCT Blood Glucose.: 108 mg/dL (14 Apr 2021 18:35)    I&O's Summary    14 Apr 2021 07:01  -  15 Apr 2021 07:00  --------------------------------------------------------  IN: 800 mL / OUT: 3800 mL / NET: -3000 mL        PHYSICAL EXAM:  GENERAL: NAD, Elderly male  HEAD:  Atraumatic, Normocephalic  EYES: EOMI, PERRLA, conjunctiva and sclera clear  CHEST/LUNG: Clear to auscultation bilaterally; No wheeze, nonlabored breathing  HEART: Regular rate and rhythm; No murmurs, rubs, or gallops  ABDOMEN: Soft, Nontender, Nondistended; Bowel sounds present  EXTREMITIES:  2+ Peripheral Pulses, No clubbing, cyanosis. B/L LE 2+ Pitting edema  NEUROLOGY: AAOx3, slightly confused this AM. Non-focal  PSYCH: calm, appropriate mood  SKIN: LUE AVF, LLE Surgical site c/d/i    LABS:                        11.2   5.92  )-----------( 298      ( 15 Apr 2021 07:32 )             34.8     04-15    136  |  100  |  25<H>  ----------------------------<  120<H>  4.0   |  30  |  3.31<H>    Ca    8.9      15 Apr 2021 07:32                RADIOLOGY & ADDITIONAL TESTS:  Results Reviewed:   Imaging Personally Reviewed:  Electrocardiogram Personally Reviewed:    COORDINATION OF CARE:  Care Discussed with Consultants/Other Providers [Y/N]:  Prior or Outpatient Records Reviewed [Y/N]:

## 2021-04-15 NOTE — PROGRESS NOTE ADULT - SUBJECTIVE AND OBJECTIVE BOX
HPI:  Pt is a 85 y/o M with PMHx of HTN, HLD, CAD, HFpEF, s/p Right CEA for Carotid artery disease, ESRD on HD 2d/week (M/Fri) via LUE fistula, s/p flecainide w/ ILR placed 6/2020, AS s/p TAVR, and PAD (RLE fem-pop bypass October 2020) who presented to [facility] on 3/13/21 with GI bleed, as well as LLE pain. 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 When patient was stable from GI, medical, cardiac standpoint, he went for LLE angiogram on 3/18, and found occlusion of L Superficial femoral artery. He was planned for a LLE fem-pop bypass, and had the bypass performed on 3/20. On 3/22, patient was a RRT for AMS, fever. Work-up revealed pleural effusions and L femoral DVT. He was started on abx for presumed pna and hep gtt for DVT. Patient began to show signs of malperfusion with coolness to touch, mottling of skin to LLE. He was planned for revision of LLE bypass. Went to OR on 3/25 for LLE bypass revision with explant of PTFE bypass, CFA to AT bypass with cryovein performed. Intra-op patient with 1500 ml of blood loss requiring 8 unit PRBC, 4 unit FFP, 1 donor unit plt.  During procedure, pt lost function of PPM with period of asystole requiring transcutaneous pacing. Cardiology consulted and pacemaker interrogated in the OR.  Pt taken to PACU post op and SICU consulted. Patient went to SICU post-op. On 3/27 patient was extubated and was downgraded from SICU to floor the next day. Rest of hospital course was uneventful. He worked with PT, was evaluated by PM&R and approved for acute rehab. The patient remained on heparin sq while in the hospital for tx of dvt (his plavix was held). Upon discharge, approved by CHALINO and Dr. Albrecht to resume plavix. He will remain on heparin sq while at rehab for dvt tx and ppx. He is not a candidate for IVC filter. Upon discharge, pain is well controlled, tolerating diet, remains HD stable, no clinical signs/symptoms of GI bleed, LLE remains warm and well perfused, he has a strong LLE palpable graft pulse and dopplerable signals. Per attending, he is medically stable for discharge to [facility] and on 4/8/21 he was transferred to [facility] for acute inpatient rehabilitation.    SUBJECTIVE / INTERVAL HPI: Patient seen and examined at bedside. He is doing well this morning, he seems to do well the day after he goes for dialysis. His orientation and memory is better this morning. He states that he slept on and off during the night. He denies any leg pain at rest and tolerated therapy well.     REVIEW OF SYSTEMS:    CONSTITUTIONAL: No fevers or chills  EYES/ENT: No visual changes;  No vertigo or throat pain   NECK: No pain or stiffness  RESPIRATORY: No cough, wheezing, or shortness of breath  CARDIOVASCULAR: No chest pain or palpitations  GASTROINTESTINAL: No abdominal or epigastric pain. No nausea or vomiting. No diarrhea or constipation.   GENITOURINARY: No dysuria, frequency or hematuria  NEUROLOGICAL: No numbness, + weakness  SKIN: No itching, rashes, +left groin and leg incisions      VITAL SIGNS:  Vital Signs Last 24 Hrs  T(C): 36.4 (15 Apr 2021 08:37), Max: 37.2 (14 Apr 2021 20:44)  T(F): 97.6 (15 Apr 2021 08:37), Max: 99 (14 Apr 2021 20:44)  HR: 80 (15 Apr 2021 08:37) (60 - 81)  BP: 139/54 (15 Apr 2021 08:37) (127/54 - 188/66)  BP(mean): --  RR: 17 (15 Apr 2021 08:37) (16 - 18)  SpO2: 98% (15 Apr 2021 08:37) (95% - 99%)    PHYSICAL EXAM:    General: NAD, sitting up in wheelchair eating breakfast  HEENT: NC/AT; PERRL, anicteric sclera; MMM  Neck: supple  Cardiovascular: +S1/S2, RRR  Respiratory: no crackles, diminished at the bases  Gastrointestinal: soft, NT/ND  Extremities: LUE AV fistula with good thrill, +improving bilateral lower extremity edema  Neurological: AAOx2, Motor exam grossly intact, no sensory deficits  Skin: Decubitus bilateral heel pressure ulcers DTI, Left groin and lateral thigh with staples. Left calf dressing with serosanguinous drainage, changed - incisions: lateral (suture) and medial (staples).  Gangrenous toes on left foot. Decubitus sacral and buttock ulcers present- unstageable    MEDICATIONS:  MEDICATIONS  (STANDING):  clopidogrel Tablet 75 milliGRAM(s) Oral daily  dextrose 40% Gel 15 Gram(s) Oral once  dextrose 5%. 1000 milliLiter(s) (50 mL/Hr) IV Continuous <Continuous>  dextrose 5%. 1000 milliLiter(s) (100 mL/Hr) IV Continuous <Continuous>  dextrose 50% Injectable 25 Gram(s) IV Push once  dextrose 50% Injectable 12.5 Gram(s) IV Push once  dextrose 50% Injectable 25 Gram(s) IV Push once  diltiazem    milliGRAM(s) Oral daily  DULoxetine 60 milliGRAM(s) Oral daily  epoetin juan-epbx (RETACRIT) Injectable 69311 Unit(s) IV Push <User Schedule>  glucagon  Injectable 1 milliGRAM(s) IntraMuscular once  heparin   Injectable 5000 Unit(s) SubCutaneous every 8 hours  hydrALAZINE 50 milliGRAM(s) Oral three times a day  insulin lispro (ADMELOG) corrective regimen sliding scale   SubCutaneous two times a day with meals  isosorbide   mononitrate ER Tablet (IMDUR) 30 milliGRAM(s) Oral daily  melatonin 6 milliGRAM(s) Oral at bedtime  Nephro-pito 1 Tablet(s) Oral daily  pantoprazole    Tablet 40 milliGRAM(s) Oral two times a day  polyethylene glycol 3350 17 Gram(s) Oral daily  senna 2 Tablet(s) Oral at bedtime  tamsulosin 0.4 milliGRAM(s) Oral at bedtime    MEDICATIONS  (PRN):  acetaminophen   Tablet .. 650 milliGRAM(s) Oral every 6 hours PRN Temp greater or equal to 38C (100.4F), Mild Pain (1 - 3)  artificial  tears Solution 1 Drop(s) Both EYES two times a day PRN Dry Eyes      ALLERGIES:  Allergies    Plavix (Hives)  Toprol-XL (Rash)    Intolerances        LABS:                        11.2   5.92  )-----------( 298      ( 15 Apr 2021 07:32 )             34.8     04-15    136  |  100  |  25<H>  ----------------------------<  120<H>  4.0   |  30  |  3.31<H>    Ca    8.9      15 Apr 2021 07:32          CAPILLARY BLOOD GLUCOSE      POCT Blood Glucose.: 130 mg/dL (15 Apr 2021 08:02)      RADIOLOGY & ADDITIONAL TESTS: Reviewed.

## 2021-04-15 NOTE — PROGRESS NOTE ADULT - SUBJECTIVE AND OBJECTIVE BOX
Resting    Vital Signs Last 24 Hrs  T(C): 36.4 (04-15-21 @ 08:37), Max: 37.2 (04-14-21 @ 20:44)  T(F): 97.6 (04-15-21 @ 08:37), Max: 99 (04-14-21 @ 20:44)  HR: 55 (04-15-21 @ 13:30) (55 - 81)  BP: 162/55 (04-15-21 @ 13:30) (113/59 - 188/66)  RR: 17 (04-15-21 @ 08:37) (17 - 17)  SpO2: 98% (04-15-21 @ 08:37) (95% - 98%)    s1s2  b/l air entry  soft  + edema b/l BARBRA KIM AVF patent                                                        11.2   5.92  )-----------( 298      ( 15 Apr 2021 07:32 )             34.8     15 Apr 2021 07:32    136    |  100    |  25     ----------------------------<  120    4.0     |  30     |  3.31     Ca    8.9        15 Apr 2021 07:32    acetaminophen   Tablet .. 650 milliGRAM(s) Oral every 6 hours PRN  artificial  tears Solution 1 Drop(s) Both EYES two times a day PRN  clopidogrel Tablet 75 milliGRAM(s) Oral daily  dextrose 40% Gel 15 Gram(s) Oral once  dextrose 5%. 1000 milliLiter(s) IV Continuous <Continuous>  dextrose 5%. 1000 milliLiter(s) IV Continuous <Continuous>  dextrose 50% Injectable 25 Gram(s) IV Push once  dextrose 50% Injectable 12.5 Gram(s) IV Push once  dextrose 50% Injectable 25 Gram(s) IV Push once  diltiazem    milliGRAM(s) Oral daily  DULoxetine 60 milliGRAM(s) Oral daily  epoetin juan-epbx (RETACRIT) Injectable 40750 Unit(s) IV Push <User Schedule>  glucagon  Injectable 1 milliGRAM(s) IntraMuscular once  heparin   Injectable 5000 Unit(s) SubCutaneous every 8 hours  hydrALAZINE 50 milliGRAM(s) Oral three times a day  insulin lispro (ADMELOG) corrective regimen sliding scale   SubCutaneous two times a day with meals  isosorbide   mononitrate ER Tablet (IMDUR) 30 milliGRAM(s) Oral daily  melatonin 6 milliGRAM(s) Oral at bedtime  Nephro-pito 1 Tablet(s) Oral daily  pantoprazole    Tablet 40 milliGRAM(s) Oral two times a day  polyethylene glycol 3350 17 Gram(s) Oral daily  senna 2 Tablet(s) Oral at bedtime  tamsulosin 0.4 milliGRAM(s) Oral at bedtime    A/P:    S/p complicated hospital course as per HPI  ESRD on HD  HD MWF  Renal diet  TMP as able  Rehab    [phone]

## 2021-04-15 NOTE — PROGRESS NOTE ADULT - ASSESSMENT
87 y/o M with extensive PMH including HTN, HLD, CAD, HFpEF, s/p Right CEA for Carotid artery disease, ESRD on HD 2d/week (M/Fri) via LUE fistula, s/p flecainide w/ ILR placed 6/2020, AS s/p TAVR, and PAD (RLE fem-pop bypass October 2020) who presented to [facility] on 3/13/21 with GI bleed, as well as LLE pain due to occulsion of L superficial fem artery.  He had a complicated hospital course and also had acute DVT, PNA, bilateral pleural effusions.  Pt apparently was not a candidate for an IVC filter.  #ESRD on HD  -HD MWF  #PAD  -s/p fem-pop bypass and revision  -Continue Plavix    #Acute left femoral vein DVT  -Not on full dose AC due to GIB and high risk. If H/H remains stable and pt without recurrent episodes, we may restart AC later on. Recommend discussing with family  -DVT ppx with HSQ  -Per chart - patient not a candidate for IVC filter??    #Acute blood loss anemia, stable, likely due to UGIB  -EGD showed multiple AVMs, cauterized during EGD  -Protonix 40mg po bid  -Monitor Hg/Hct, transfuse if Hg <8  -EPO with dialysis on M + F    #Chronic diastolic CHF, stable  #Bioprosthetic aortic valve  -TTE done 3/14/2021; reviewed reading  -AV normally functioning  -continue Cardizem 240mg daily    #HTN  -Chronic uncontrolled  -C/w Hydralazine 50mg TID  -C/w Imdur w/ holding parameters  -Continue Cardizem   -Monitor vitals    #DM-II, A1c 5.7 3/26  - ISS  - fingersticks q ac and hs    #BPH  -continue Flomax

## 2021-04-15 NOTE — PROGRESS NOTE ADULT - ASSESSMENT
Pt is a 87 y/o M with PMHx of HTN, HLD, CAD, HFpEF, s/p Right CEA for Carotid artery disease, ESRD on HD 2d/week (M/Fri) via LUE fistula, s/p flecainide w/ ILR placed 6/2020, AS s/p TAVR, and PAD who presented to [facility] on 3/13/21 with GI bleed, as well as LLE pain, found to have occlusion of left superficial femoral artery. Pt underwent EGD and AVM cauterizations. He also underwent left fem-pop bypass x2 and was found to have LLE DVT, treated with heparin. He has functional deficits of with his ambulation and his endurance.      Comprehensive rehab program: -PT/OT/ST-total of 3 hrs/day 5 days/week     #PAD:  - Continue Plavix 75mg  - s/p fem-pop bypass and revision  - Completed Keflex 500mg bid for possible superficial infection    #DVT  - left femoral vein DVT  - on Heparin sq tid  - Per chart - patient not a candidate for IVC filter    #UGIB  - egd showed multiple AVMs, cauterized during EGD  - Protonix bid    #CKD  - on M/F dialysis with extra dialysis intermittently  - Renal consult  - Epoetin Yanick with dialysis    #CHF  - continue Cardizem 240mg daily  - Torsemide d/c by renal  - 4/9   - CXR with Pleural effusion - repeat CXR this week  - Imdur 30mg daily    #HTN  - continue Hydralazine changed to 50mg tid     #DM  - ISS  - FS has been controlled - FS bid     #Pain  - Cymbalta 60mg daily, - Tylenol PRN, especially before therapy , - Ice to left knee    #BPH  - continue Flomax   - Patient with some spontaneous void     #GI  - Miralax, senna    #Skin  - Bilateral heel pressure ulcers DTI: off-load heels  - Sacrum and buttock decubitus ulcers: were unstageable, starting to heal  - left calf dressing change BID and PRN - medial with suture and lateral with sutures  - staples present at groin and lateral thigh    #Diet  - Soft and bite sized  - Consistent Carb, DASH/TLC, 1500mL fluid restriction, Renal diet    Hospitalist and renal fu noted     #Dispo  Team meeting DATE: 4/13   Nursing: continent with 2 person assist  SW: lives with wife who provided supervision in the shower. There are 3 steps to enter the house, 0 inside. Daughters may be able to assist. Daughter trying to avoid LYNNE placement  OT: min A eating, mod A grooming, max A UBD, total A LBD, toileting, transfers. Barriers: endurance. Goals: mod A  PT: mod A sit to stand, 2 person assist pivot, no ambulation or stairs as of yet. Barriers: endurance, participation. Goals: min/mod A  ST: severe cog deficits, fluctuating orientation and memory, will need 24/7 supervision, cannot manage meds or money  EDOD: 4/29      Hospitalist and renal fu  noted.    Daughter updated over status    I did P2 P with Dr. Adam, yesterday, 4/13 for IRF coverage, Stay denied and I have requested for appeal. Family notified

## 2021-04-15 NOTE — PROGRESS NOTE ADULT - ATTENDING COMMENTS
Chart reviewed. Patient seen at bedside. In good spirits this am and appears energetic, however orientation varies    2. Patient denies any dyspnea/CP/HA  Tolerated HD yesterday.   LLE incisions: stable.     3. Patient showing good progress in therapy with ambulation 30' with walker and improved with transfers  Continue rehab program

## 2021-04-16 LAB
ALBUMIN SERPL ELPH-MCNC: 2.3 G/DL — LOW (ref 3.3–5)
ANION GAP SERPL CALC-SCNC: 7 MMOL/L — SIGNIFICANT CHANGE UP (ref 5–17)
BUN SERPL-MCNC: 42 MG/DL — HIGH (ref 7–23)
CALCIUM SERPL-MCNC: 8.9 MG/DL — SIGNIFICANT CHANGE UP (ref 8.4–10.5)
CHLORIDE SERPL-SCNC: 99 MMOL/L — SIGNIFICANT CHANGE UP (ref 96–108)
CO2 SERPL-SCNC: 29 MMOL/L — SIGNIFICANT CHANGE UP (ref 22–31)
CREAT SERPL-MCNC: 4.66 MG/DL — HIGH (ref 0.5–1.3)
GLUCOSE BLDC GLUCOMTR-MCNC: 100 MG/DL — HIGH (ref 70–99)
GLUCOSE BLDC GLUCOMTR-MCNC: 103 MG/DL — HIGH (ref 70–99)
GLUCOSE SERPL-MCNC: 129 MG/DL — HIGH (ref 70–99)
HCT VFR BLD CALC: 31.6 % — LOW (ref 39–50)
HGB BLD-MCNC: 10.2 G/DL — LOW (ref 13–17)
MCHC RBC-ENTMCNC: 30.4 PG — SIGNIFICANT CHANGE UP (ref 27–34)
MCHC RBC-ENTMCNC: 32.3 GM/DL — SIGNIFICANT CHANGE UP (ref 32–36)
MCV RBC AUTO: 94 FL — SIGNIFICANT CHANGE UP (ref 80–100)
NRBC # BLD: 0 /100 WBCS — SIGNIFICANT CHANGE UP (ref 0–0)
PHOSPHATE SERPL-MCNC: 5.1 MG/DL — HIGH (ref 2.5–4.5)
PLATELET # BLD AUTO: 302 K/UL — SIGNIFICANT CHANGE UP (ref 150–400)
POTASSIUM SERPL-MCNC: 4.2 MMOL/L — SIGNIFICANT CHANGE UP (ref 3.5–5.3)
POTASSIUM SERPL-SCNC: 4.2 MMOL/L — SIGNIFICANT CHANGE UP (ref 3.5–5.3)
RBC # BLD: 3.36 M/UL — LOW (ref 4.2–5.8)
RBC # FLD: 17.5 % — HIGH (ref 10.3–14.5)
SODIUM SERPL-SCNC: 135 MMOL/L — SIGNIFICANT CHANGE UP (ref 135–145)
WBC # BLD: 7.15 K/UL — SIGNIFICANT CHANGE UP (ref 3.8–10.5)
WBC # FLD AUTO: 7.15 K/UL — SIGNIFICANT CHANGE UP (ref 3.8–10.5)

## 2021-04-16 PROCEDURE — 99232 SBSQ HOSP IP/OBS MODERATE 35: CPT | Mod: GC

## 2021-04-16 PROCEDURE — 99232 SBSQ HOSP IP/OBS MODERATE 35: CPT

## 2021-04-16 RX ORDER — HYDRALAZINE HCL 50 MG
25 TABLET ORAL EVERY 12 HOURS
Refills: 0 | Status: DISCONTINUED | OUTPATIENT
Start: 2021-04-16 | End: 2021-04-21

## 2021-04-16 RX ADMIN — HEPARIN SODIUM 5000 UNIT(S): 5000 INJECTION INTRAVENOUS; SUBCUTANEOUS at 06:09

## 2021-04-16 RX ADMIN — TAMSULOSIN HYDROCHLORIDE 0.4 MILLIGRAM(S): 0.4 CAPSULE ORAL at 21:02

## 2021-04-16 RX ADMIN — PANTOPRAZOLE SODIUM 40 MILLIGRAM(S): 20 TABLET, DELAYED RELEASE ORAL at 06:09

## 2021-04-16 RX ADMIN — HEPARIN SODIUM 5000 UNIT(S): 5000 INJECTION INTRAVENOUS; SUBCUTANEOUS at 21:02

## 2021-04-16 RX ADMIN — Medication 650 MILLIGRAM(S): at 02:41

## 2021-04-16 RX ADMIN — Medication 50 MILLIGRAM(S): at 06:09

## 2021-04-16 RX ADMIN — PANTOPRAZOLE SODIUM 40 MILLIGRAM(S): 20 TABLET, DELAYED RELEASE ORAL at 18:03

## 2021-04-16 RX ADMIN — ERYTHROPOIETIN 10000 UNIT(S): 10000 INJECTION, SOLUTION INTRAVENOUS; SUBCUTANEOUS at 14:53

## 2021-04-16 RX ADMIN — SENNA PLUS 2 TABLET(S): 8.6 TABLET ORAL at 21:02

## 2021-04-16 RX ADMIN — Medication 6 MILLIGRAM(S): at 21:02

## 2021-04-16 RX ADMIN — CLOPIDOGREL BISULFATE 75 MILLIGRAM(S): 75 TABLET, FILM COATED ORAL at 11:43

## 2021-04-16 RX ADMIN — Medication 240 MILLIGRAM(S): at 06:09

## 2021-04-16 RX ADMIN — Medication 650 MILLIGRAM(S): at 03:33

## 2021-04-16 RX ADMIN — Medication 1 TABLET(S): at 11:43

## 2021-04-16 RX ADMIN — DULOXETINE HYDROCHLORIDE 60 MILLIGRAM(S): 30 CAPSULE, DELAYED RELEASE ORAL at 11:43

## 2021-04-16 RX ADMIN — HEPARIN SODIUM 5000 UNIT(S): 5000 INJECTION INTRAVENOUS; SUBCUTANEOUS at 13:40

## 2021-04-16 NOTE — PROGRESS NOTE ADULT - SUBJECTIVE AND OBJECTIVE BOX
HPI:  Pt is a 87 y/o M with PMHx of HTN, HLD, CAD, HFpEF, s/p Right CEA for Carotid artery disease, ESRD on HD 2d/week (M/Fri) via LUE fistula, s/p flecainide w/ ILR placed 6/2020, AS s/p TAVR, and PAD (RLE fem-pop bypass October 2020) who presented to [facility] on 3/13/21 with GI bleed, as well as LLE pain. 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 When patient was stable from GI, medical, cardiac standpoint, he went for LLE angiogram on 3/18, and found occlusion of L Superficial femoral artery. He was planned for a LLE fem-pop bypass, and had the bypass performed on 3/20. On 3/22, patient was a RRT for AMS, fever. Work-up revealed pleural effusions and L femoral DVT. He was started on abx for presumed pna and hep gtt for DVT. Patient began to show signs of malperfusion with coolness to touch, mottling of skin to LLE. He was planned for revision of LLE bypass. Went to OR on 3/25 for LLE bypass revision with explant of PTFE bypass, CFA to AT bypass with cryovein performed. Intra-op patient with 1500 ml of blood loss requiring 8 unit PRBC, 4 unit FFP, 1 donor unit plt.  During procedure, pt lost function of PPM with period of asystole requiring transcutaneous pacing. Cardiology consulted and pacemaker interrogated in the OR.  Pt taken to PACU post op and SICU consulted. Patient went to SICU post-op. On 3/27 patient was extubated and was downgraded from SICU to floor the next day. Rest of hospital course was uneventful. He worked with PT, was evaluated by PM&R and approved for acute rehab. The patient remained on heparin sq while in the hospital for tx of dvt (his plavix was held). Upon discharge, approved by CHALINO and Dr. Albrecht to resume plavix. He will remain on heparin sq while at rehab for dvt tx and ppx. He is not a candidate for IVC filter. Upon discharge, pain is well controlled, tolerating diet, remains HD stable, no clinical signs/symptoms of GI bleed, LLE remains warm and well perfused, he has a strong LLE palpable graft pulse and dopplerable signals. Per attending, he is medically stable for discharge to AR and on 4/8/21 he was transferred to [facility] for acute inpatient rehabilitation.    SUBJECTIVE / INTERVAL HPI: Patient seen and examined with PT. He is doing well this morning and slept overnight. He was able to stand up with OT earlier this morning and his orientation seems to continue to be improving. He denies leg pain this morning. He is due for dialysis this afternoon.     REVIEW OF SYSTEMS:    CONSTITUTIONAL: No fevers or chills  EYES/ENT: No visual changes;  No vertigo or throat pain   NECK: No pain or stiffness  RESPIRATORY: No cough, wheezing, or shortness of breath  CARDIOVASCULAR: No chest pain or palpitations  GASTROINTESTINAL: No abdominal or epigastric pain. No nausea or vomiting. No diarrhea or constipation.   GENITOURINARY: No dysuria, frequency or hematuria  NEUROLOGICAL: No numbness, + weakness  SKIN: No itching, rashes, +left groin and leg incisions      VITAL SIGNS:  Vital Signs Last 24 Hrs  T(C): 36.6 (16 Apr 2021 07:28), Max: 36.8 (16 Apr 2021 06:00)  T(F): 97.8 (16 Apr 2021 07:28), Max: 98.2 (16 Apr 2021 06:00)  HR: 62 (16 Apr 2021 07:28) (55 - 73)  BP: 162/61 (16 Apr 2021 07:28) (113/59 - 162/61)  BP(mean): 94 (16 Apr 2021 06:00) (92 - 94)  RR: 16 (16 Apr 2021 07:28) (16 - 16)  SpO2: 97% (16 Apr 2021 07:28) (94% - 97%)    PHYSICAL EXAM:    General: NAD, sitting in wheelchair comfortably  HEENT: NC/AT; PERRL, anicteric sclera; MMM  Neck: supple  Cardiovascular: +S1/S2, RRR  Respiratory: CTA B/L, but breath sounds are decreased at bases  Gastrointestinal: soft, NT/ND  Extremities: LUE AV fistula intact, bilateral lower extremities with edema  Neurological: AAOx2, Motor exam grossly intact, no sensory deficits  Skin: Decubitus bilateral heel pressure ulcers DTI, Left groin and lateral thigh with staples. Left calf dressing with serosanguinous drainage, changed - incisions: lateral (suture) and medial (staples).  Gangrenous toes on left foot. Decubitus sacral and buttock ulcers present- unstageable    MEDICATIONS:  MEDICATIONS  (STANDING):  clopidogrel Tablet 75 milliGRAM(s) Oral daily  dextrose 40% Gel 15 Gram(s) Oral once  dextrose 5%. 1000 milliLiter(s) (50 mL/Hr) IV Continuous <Continuous>  dextrose 5%. 1000 milliLiter(s) (100 mL/Hr) IV Continuous <Continuous>  dextrose 50% Injectable 25 Gram(s) IV Push once  dextrose 50% Injectable 12.5 Gram(s) IV Push once  dextrose 50% Injectable 25 Gram(s) IV Push once  diltiazem    milliGRAM(s) Oral daily  DULoxetine 60 milliGRAM(s) Oral daily  epoetin juan-epbx (RETACRIT) Injectable 74299 Unit(s) IV Push <User Schedule>  glucagon  Injectable 1 milliGRAM(s) IntraMuscular once  heparin   Injectable 5000 Unit(s) SubCutaneous every 8 hours  hydrALAZINE 50 milliGRAM(s) Oral three times a day  insulin lispro (ADMELOG) corrective regimen sliding scale   SubCutaneous two times a day with meals  isosorbide   mononitrate ER Tablet (IMDUR) 30 milliGRAM(s) Oral daily  melatonin 6 milliGRAM(s) Oral at bedtime  Nephro-pito 1 Tablet(s) Oral daily  pantoprazole    Tablet 40 milliGRAM(s) Oral two times a day  polyethylene glycol 3350 17 Gram(s) Oral daily  senna 2 Tablet(s) Oral at bedtime  tamsulosin 0.4 milliGRAM(s) Oral at bedtime    MEDICATIONS  (PRN):  acetaminophen   Tablet .. 650 milliGRAM(s) Oral every 6 hours PRN Temp greater or equal to 38C (100.4F), Mild Pain (1 - 3)  artificial  tears Solution 1 Drop(s) Both EYES two times a day PRN Dry Eyes      ALLERGIES:  Allergies    Plavix (Hives)  Toprol-XL (Rash)    Intolerances        LABS:                        11.2   5.92  )-----------( 298      ( 15 Apr 2021 07:32 )             34.8     04-15    136  |  100  |  25<H>  ----------------------------<  120<H>  4.0   |  30  |  3.31<H>    Ca    8.9      15 Apr 2021 07:32          CAPILLARY BLOOD GLUCOSE      POCT Blood Glucose.: 100 mg/dL (16 Apr 2021 07:30)      RADIOLOGY & ADDITIONAL TESTS: Reviewed.

## 2021-04-16 NOTE — PROGRESS NOTE ADULT - ATTENDING COMMENTS
I agree with above note/plan.  Patient is stable to continue inpatient rehab program  Discussed with medical hospitalist/ RN

## 2021-04-16 NOTE — PROGRESS NOTE ADULT - ASSESSMENT
87 y/o M with extensive PMH including HTN, HLD, CAD, HFpEF, s/p Right CEA for Carotid artery disease, ESRD on HD 2d/week (M/Fri) via LUE fistula, s/p flecainide w/ ILR placed 6/2020, AS s/p TAVR, and PAD (RLE fem-pop bypass October 2020) who presented to [facility] on 3/13/21 with GI bleed, as well as LLE pain due to occulsion of L superficial fem artery.  He had a complicated hospital course and also had acute DVT, PNA, bilateral pleural effusions.  Pt apparently was not a candidate for an IVC filter.  #ESRD on HD  -HD MWF  #PAD  -s/p fem-pop bypass and revision  -Continue Plavix    #Acute left femoral vein DVT  -Not on full dose AC due to GIB and high risk. If H/H remains stable and pt without recurrent episodes, we may restart AC later on. Recommend discussing with family  -DVT ppx with HSQ  -Per chart - patient not a candidate for IVC filter??    #Acute blood loss anemia, stable, likely due to UGIB  -EGD showed multiple AVMs, cauterized during EGD  -Protonix 40mg po bid  -Monitor Hg/Hct, transfuse if Hg <8  -EPO with dialysis on M + F    #Chronic diastolic CHF, stable  #Bioprosthetic aortic valve  -TTE done 3/14/2021; reviewed reading  -AV normally functioning  -continue Cardizem 240mg daily    #HTN  -Chronic fluctuating SBP  -C/w Hydralazine 50mg TID  -C/w Imdur w/ holding parameters  -Continue Cardizem   -Monitor vitals    #DM-II, A1c 5.7 3/26  - ISS  - fingersticks q ac and hs    #BPH  -continue Flomax

## 2021-04-16 NOTE — PROGRESS NOTE ADULT - SUBJECTIVE AND OBJECTIVE BOX
Resting    Vital Signs Last 24 Hrs  T(C): 36.6 (04-16-21 @ 18:01), Max: 36.8 (04-16-21 @ 06:00)  T(F): 97.8 (04-16-21 @ 18:01), Max: 98.2 (04-16-21 @ 06:00)  HR: 65 (04-16-21 @ 18:01) (61 - 66)  BP: 145/50 (04-16-21 @ 18:01) (118/48 - 162/61)  BP(mean): 50 (04-16-21 @ 17:40) (50 - 94)  RR: 16 (04-16-21 @ 18:01) (16 - 16)  SpO2: 97% (04-16-21 @ 18:01) (94% - 97%)    s1s2  b/l air entry  soft  + edema b/l LE, LUE AVF patent                                                                10.2   7.15  )-----------( 302      ( 16 Apr 2021 14:20 )             31.6     16 Apr 2021 14:20    135    |  99     |  42     ----------------------------<  129    4.2     |  29     |  4.66     Ca    8.9        16 Apr 2021 14:20  Phos  5.1       16 Apr 2021 14:20    TPro  x      /  Alb  2.3    /  TBili  x      /  DBili  x      /  AST  x      /  ALT  x      /  AlkPhos  x      16 Apr 2021 14:20    LIVER FUNCTIONS - ( 16 Apr 2021 14:20 )  Alb: 2.3 g/dL / Pro: x     / ALK PHOS: x     / ALT: x     / AST: x     / GGT: x           acetaminophen   Tablet .. 650 milliGRAM(s) Oral every 6 hours PRN  artificial  tears Solution 1 Drop(s) Both EYES two times a day PRN  clopidogrel Tablet 75 milliGRAM(s) Oral daily  dextrose 40% Gel 15 Gram(s) Oral once  dextrose 5%. 1000 milliLiter(s) IV Continuous <Continuous>  dextrose 5%. 1000 milliLiter(s) IV Continuous <Continuous>  dextrose 50% Injectable 25 Gram(s) IV Push once  dextrose 50% Injectable 12.5 Gram(s) IV Push once  dextrose 50% Injectable 25 Gram(s) IV Push once  diltiazem    milliGRAM(s) Oral daily  DULoxetine 60 milliGRAM(s) Oral daily  epoetin juan-epbx (RETACRIT) Injectable 07727 Unit(s) IV Push <User Schedule>  glucagon  Injectable 1 milliGRAM(s) IntraMuscular once  heparin   Injectable 5000 Unit(s) SubCutaneous every 8 hours  hydrALAZINE 50 milliGRAM(s) Oral three times a day  insulin lispro (ADMELOG) corrective regimen sliding scale   SubCutaneous two times a day with meals  isosorbide   mononitrate ER Tablet (IMDUR) 30 milliGRAM(s) Oral daily  melatonin 6 milliGRAM(s) Oral at bedtime  Nephro-pito 1 Tablet(s) Oral daily  pantoprazole    Tablet 40 milliGRAM(s) Oral two times a day  polyethylene glycol 3350 17 Gram(s) Oral daily  senna 2 Tablet(s) Oral at bedtime  tamsulosin 0.4 milliGRAM(s) Oral at bedtime    A/P:    S/p complicated hospital course as per HPI  ESRD on HD  HD MWF  TMP 2kg w/HD today  Renal diet  Rehab    [phone]

## 2021-04-16 NOTE — PROGRESS NOTE ADULT - ASSESSMENT
Pt is a 85 y/o M with PMHx of HTN, HLD, CAD, HFpEF, s/p Right CEA for Carotid artery disease, ESRD on HD 2d/week (M/Fri) via LUE fistula, s/p flecainide w/ ILR placed 6/2020, AS s/p TAVR, and PAD who presented to [facility] on 3/13/21 with GI bleed, as well as LLE pain, found to have occlusion of left superficial femoral artery. Pt underwent EGD and AVM cauterizations. He also underwent left fem-pop bypass x2 and was found to have LLE DVT, treated with heparin. He has functional deficits of with his ambulation and his endurance.      Comprehensive rehab program: -PT/OT/ST-total of 3 hrs/day 5 days/week     #PAD:  - Continue Plavix 75mg  - s/p fem-pop bypass and revision  - Completed Keflex 500mg bid for possible superficial infection    #DVT  - left femoral vein DVT  - on Heparin sq tid  - Per chart - patient not a candidate for IVC filter    #UGIB  - egd showed multiple AVMs, cauterized during EGD  - Protonix bid    #CKD  - on M/F dialysis with extra dialysis intermittently  - Renal consult  - Epoetin Yanick with dialysis    #CHF  - continue Cardizem 240mg daily  - Torsemide d/c by renal  - 4/9   - CXR with Pleural effusion  - Imdur 30mg daily    #HTN  - continue Hydralazine changed to 50mg tid     #DM  - ISS  - FS has been controlled - FS bid     #Pain  - Cymbalta 60mg daily  - Tylenol PRN, especially before therapy   - Ice to left knee    #BPH  - continue Flomax   - Patient with some spontaneous void     #GI  - Miralax, senna    #Skin  - Bilateral heel pressure ulcers DTI: off-load heels  - Sacrum and buttock decubitus ulcers: were unstageable, starting to heal  - left calf dressing change BID and PRN - medial with suture and lateral with sutures  - staples present at groin and lateral thigh    #Diet  - Soft and bite sized  - Consistent Carb, DASH/TLC, 1500mL fluid restriction, Renal diet    Hospitalist and renal fu noted     #Dispo  Team meeting DATE: 4/13   Nursing: continent with 2 person assist  SW: lives with wife who provided supervision in the shower. There are 3 steps to enter the house, 0 inside. Daughters may be able to assist. Daughter trying to avoid LYNNE placement  OT: min A eating, mod A grooming, max A UBD, total A LBD, toileting, transfers. Barriers: endurance. Goals: mod A  PT: mod A sit to stand, 2 person assist pivot, no ambulation or stairs as of yet. Barriers: endurance, participation. Goals: min/mod A  ST: severe cog deficits, fluctuating orientation and memory, will need 24/7 supervision, cannot manage meds or money  EDOD: 4/29      Hospitalist and renal fu  noted.    Daughter updated over status    P2P with Dr. Adam on 4/13 for IRF coverage, Stay denied and appeal was requested for appeal. Family notified

## 2021-04-16 NOTE — PROGRESS NOTE ADULT - SUBJECTIVE AND OBJECTIVE BOX
Patient is a 86y old  Male who presents with a chief complaint of Impairment group -13 - PAD (15 Apr 2021 18:51)      SUBJECTIVE / OVERNIGHT EVENTS:  Pt seen and examined at bedside. No acute events overnight.  Pt denies cp, palpitations, sob, abd pain, N/V, fever, chills.    ROS:  All other review of systems negative    Allergies    Plavix (Hives)  Toprol-XL (Rash)    Intolerances        MEDICATIONS  (STANDING):  clopidogrel Tablet 75 milliGRAM(s) Oral daily  dextrose 40% Gel 15 Gram(s) Oral once  dextrose 5%. 1000 milliLiter(s) (50 mL/Hr) IV Continuous <Continuous>  dextrose 5%. 1000 milliLiter(s) (100 mL/Hr) IV Continuous <Continuous>  dextrose 50% Injectable 25 Gram(s) IV Push once  dextrose 50% Injectable 12.5 Gram(s) IV Push once  dextrose 50% Injectable 25 Gram(s) IV Push once  diltiazem    milliGRAM(s) Oral daily  DULoxetine 60 milliGRAM(s) Oral daily  epoetin juan-epbx (RETACRIT) Injectable 13607 Unit(s) IV Push <User Schedule>  glucagon  Injectable 1 milliGRAM(s) IntraMuscular once  heparin   Injectable 5000 Unit(s) SubCutaneous every 8 hours  hydrALAZINE 50 milliGRAM(s) Oral three times a day  insulin lispro (ADMELOG) corrective regimen sliding scale   SubCutaneous two times a day with meals  isosorbide   mononitrate ER Tablet (IMDUR) 30 milliGRAM(s) Oral daily  melatonin 6 milliGRAM(s) Oral at bedtime  Nephro-pito 1 Tablet(s) Oral daily  pantoprazole    Tablet 40 milliGRAM(s) Oral two times a day  polyethylene glycol 3350 17 Gram(s) Oral daily  senna 2 Tablet(s) Oral at bedtime  tamsulosin 0.4 milliGRAM(s) Oral at bedtime    MEDICATIONS  (PRN):  acetaminophen   Tablet .. 650 milliGRAM(s) Oral every 6 hours PRN Temp greater or equal to 38C (100.4F), Mild Pain (1 - 3)  artificial  tears Solution 1 Drop(s) Both EYES two times a day PRN Dry Eyes      Vital Signs Last 24 Hrs  T(C): 36.6 (16 Apr 2021 07:28), Max: 36.8 (16 Apr 2021 06:00)  T(F): 97.8 (16 Apr 2021 07:28), Max: 98.2 (16 Apr 2021 06:00)  HR: 62 (16 Apr 2021 07:28) (55 - 73)  BP: 162/61 (16 Apr 2021 07:28) (113/59 - 162/61)  BP(mean): 94 (16 Apr 2021 06:00) (92 - 94)  RR: 16 (16 Apr 2021 07:28) (16 - 16)  SpO2: 97% (16 Apr 2021 07:28) (94% - 97%)  CAPILLARY BLOOD GLUCOSE      POCT Blood Glucose.: 100 mg/dL (16 Apr 2021 07:30)  POCT Blood Glucose.: 183 mg/dL (15 Apr 2021 17:03)    I&O's Summary      PHYSICAL EXAM:  GENERAL: NAD, Elderly male  HEAD:  Atraumatic, Normocephalic  EYES: EOMI, PERRLA, conjunctiva and sclera clear  CHEST/LUNG: Clear to auscultation bilaterally; No wheeze, nonlabored breathing  HEART: Regular rate and rhythm; No murmurs, rubs, or gallops  ABDOMEN: Soft, Nontender, Nondistended; Bowel sounds present  EXTREMITIES:  2+ Peripheral Pulses, No clubbing, cyanosis. B/L LE 2+ Pitting edema  NEUROLOGY: AAOx3, slightly confused this AM. Non-focal  PSYCH: calm, appropriate mood  SKIN: LUE AVF, LLE Surgical site c/d/i    LABS:                        11.2   5.92  )-----------( 298      ( 15 Apr 2021 07:32 )             34.8     04-15    136  |  100  |  25<H>  ----------------------------<  120<H>  4.0   |  30  |  3.31<H>    Ca    8.9      15 Apr 2021 07:32                RADIOLOGY & ADDITIONAL TESTS:  Results Reviewed:   Imaging Personally Reviewed:  Electrocardiogram Personally Reviewed:    COORDINATION OF CARE:  Care Discussed with Consultants/Other Providers [Y/N]:  Prior or Outpatient Records Reviewed [Y/N]:

## 2021-04-17 LAB
GLUCOSE BLDC GLUCOMTR-MCNC: 103 MG/DL — HIGH (ref 70–99)
GLUCOSE BLDC GLUCOMTR-MCNC: 194 MG/DL — HIGH (ref 70–99)

## 2021-04-17 PROCEDURE — 99232 SBSQ HOSP IP/OBS MODERATE 35: CPT

## 2021-04-17 RX ADMIN — HEPARIN SODIUM 5000 UNIT(S): 5000 INJECTION INTRAVENOUS; SUBCUTANEOUS at 13:40

## 2021-04-17 RX ADMIN — DULOXETINE HYDROCHLORIDE 60 MILLIGRAM(S): 30 CAPSULE, DELAYED RELEASE ORAL at 11:32

## 2021-04-17 RX ADMIN — HEPARIN SODIUM 5000 UNIT(S): 5000 INJECTION INTRAVENOUS; SUBCUTANEOUS at 21:19

## 2021-04-17 RX ADMIN — Medication 25 MILLIGRAM(S): at 05:56

## 2021-04-17 RX ADMIN — PANTOPRAZOLE SODIUM 40 MILLIGRAM(S): 20 TABLET, DELAYED RELEASE ORAL at 17:16

## 2021-04-17 RX ADMIN — CLOPIDOGREL BISULFATE 75 MILLIGRAM(S): 75 TABLET, FILM COATED ORAL at 11:31

## 2021-04-17 RX ADMIN — Medication 25 MILLIGRAM(S): at 17:17

## 2021-04-17 RX ADMIN — Medication 240 MILLIGRAM(S): at 05:56

## 2021-04-17 RX ADMIN — TAMSULOSIN HYDROCHLORIDE 0.4 MILLIGRAM(S): 0.4 CAPSULE ORAL at 21:19

## 2021-04-17 RX ADMIN — PANTOPRAZOLE SODIUM 40 MILLIGRAM(S): 20 TABLET, DELAYED RELEASE ORAL at 05:56

## 2021-04-17 RX ADMIN — Medication 1 TABLET(S): at 11:32

## 2021-04-17 RX ADMIN — SENNA PLUS 2 TABLET(S): 8.6 TABLET ORAL at 21:19

## 2021-04-17 RX ADMIN — Medication 6 MILLIGRAM(S): at 21:19

## 2021-04-17 RX ADMIN — HEPARIN SODIUM 5000 UNIT(S): 5000 INJECTION INTRAVENOUS; SUBCUTANEOUS at 05:56

## 2021-04-17 RX ADMIN — Medication 2: at 17:16

## 2021-04-17 NOTE — PROGRESS NOTE ADULT - ASSESSMENT
Pt is a 85 y/o M with PMHx of HTN, HLD, CAD, HFpEF, s/p Right CEA for Carotid artery disease, ESRD on HD 2d/week (M/Fri) via LUE fistula, s/p flecainide w/ ILR placed 6/2020, AS s/p TAVR, and PAD who presented to [facility] on 3/13/21 with GI bleed, as well as LLE pain, found to have occlusion of left superficial femoral artery. Pt underwent EGD and AVM cauterizations. He also underwent left fem-pop bypass x2 and was found to have LLE DVT, treated with heparin. He has functional deficits of with his ambulation and his endurance.      Comprehensive rehab program: -PT/OT/ST-total of 3 hrs/day 5 days/week     #PAD:  - Continue Plavix 75mg  - s/p fem-pop bypass and revision  - Completed Keflex 500mg bid for possible superficial infection    #DVT  - left femoral vein DVT  - on Heparin sq tid  - Per chart - patient not a candidate for IVC filter    #UGIB  - egd showed multiple AVMs, cauterized during EGD  - Protonix bid    #CKD  - on M/F dialysis with extra dialysis intermittently  - Renal following  - Epoetin Yanick with dialysis    #CHF  - continue Cardizem 240mg daily  - Torsemide d/c by renal  - 4/9   - CXR with Pleural effusion  - Imdur 30mg daily  - Hospitalist note appreciated    #HTN  - continue Hydralazine changed to 50mg tid     #DM  - ISS  - FS has been controlled - FS bid     #Pain  - Cymbalta 60mg daily  - Tylenol PRN, especially before therapy   - Ice to left knee    #BPH  - continue Flomax   - Patient with some spontaneous void     #GI  - Miralax, senna    #Skin  - Bilateral heel pressure ulcers DTI: off-load heels  - Sacrum and buttock decubitus ulcers: were unstageable, starting to heal  - left calf dressing change BID and PRN - medial with suture and lateral with sutures  - staples present at groin and lateral thigh    #Diet  - Soft and bite sized  - Consistent Carb, DASH/TLC, 1500mL fluid restriction, Renal diet    Labs:  CBC, BMP 4/19

## 2021-04-17 NOTE — PROGRESS NOTE ADULT - SUBJECTIVE AND OBJECTIVE BOX
Resting    Vital Signs Last 24 Hrs  T(C): 36.6 (04-17-21 @ 08:16), Max: 36.7 (04-16-21 @ 20:14)  T(F): 97.9 (04-17-21 @ 08:16), Max: 98.1 (04-16-21 @ 20:14)  HR: 66 (04-17-21 @ 08:16) (61 - 69)  BP: 115/67 (04-17-21 @ 11:34) (115/67 - 169/70)  BP(mean): 50 (04-16-21 @ 17:40) (50 - 50)  RR: 15 (04-17-21 @ 08:16) (15 - 16)  SpO2: 96% (04-17-21 @ 08:16) (96% - 97%)    s1s2  b/l air entry  soft  tr edema b/l LE, LUE AVF patent                                                                        10.2   7.15  )-----------( 302      ( 16 Apr 2021 14:20 )             31.6     16 Apr 2021 14:20    135    |  99     |  42     ----------------------------<  129    4.2     |  29     |  4.66     Ca    8.9        16 Apr 2021 14:20  Phos  5.1       16 Apr 2021 14:20    TPro  x      /  Alb  2.3    /  TBili  x      /  DBili  x      /  AST  x      /  ALT  x      /  AlkPhos  x      16 Apr 2021 14:20    LIVER FUNCTIONS - ( 16 Apr 2021 14:20 )  Alb: 2.3 g/dL / Pro: x     / ALK PHOS: x     / ALT: x     / AST: x     / GGT: x           acetaminophen   Tablet .. 650 milliGRAM(s) Oral every 6 hours PRN  artificial  tears Solution 1 Drop(s) Both EYES two times a day PRN  clopidogrel Tablet 75 milliGRAM(s) Oral daily  dextrose 40% Gel 15 Gram(s) Oral once  dextrose 5%. 1000 milliLiter(s) IV Continuous <Continuous>  dextrose 5%. 1000 milliLiter(s) IV Continuous <Continuous>  dextrose 50% Injectable 25 Gram(s) IV Push once  dextrose 50% Injectable 12.5 Gram(s) IV Push once  dextrose 50% Injectable 25 Gram(s) IV Push once  diltiazem    milliGRAM(s) Oral daily  DULoxetine 60 milliGRAM(s) Oral daily  epoetin juan-epbx (RETACRIT) Injectable 83929 Unit(s) IV Push <User Schedule>  glucagon  Injectable 1 milliGRAM(s) IntraMuscular once  heparin   Injectable 5000 Unit(s) SubCutaneous every 8 hours  hydrALAZINE 25 milliGRAM(s) Oral every 12 hours  insulin lispro (ADMELOG) corrective regimen sliding scale   SubCutaneous two times a day with meals  isosorbide   mononitrate ER Tablet (IMDUR) 30 milliGRAM(s) Oral daily  melatonin 6 milliGRAM(s) Oral at bedtime  Nephro-pito 1 Tablet(s) Oral daily  pantoprazole    Tablet 40 milliGRAM(s) Oral two times a day  polyethylene glycol 3350 17 Gram(s) Oral daily  senna 2 Tablet(s) Oral at bedtime  tamsulosin 0.4 milliGRAM(s) Oral at bedtime    A/P:    S/p complicated hospital course as per HPI  ESRD on HD  HD MWF  TMP 2kg w/HD yesterday  Renal diet  Rehab    [phone]

## 2021-04-17 NOTE — PROGRESS NOTE ADULT - SUBJECTIVE AND OBJECTIVE BOX
Patient is a 86y old  Male who presents with a chief complaint of Impairment group -13 - PAD (17 Apr 2021 10:14)      SUBJECTIVE / OVERNIGHT EVENTS:  Pt seen and examined at bedside. No acute events overnight.  Pt denies cp, palpitations, sob, abd pain, N/V, fever, chills.    ROS:  All other review of systems negative    Allergies    Plavix (Hives)  Toprol-XL (Rash)    Intolerances        MEDICATIONS  (STANDING):  clopidogrel Tablet 75 milliGRAM(s) Oral daily  dextrose 40% Gel 15 Gram(s) Oral once  dextrose 5%. 1000 milliLiter(s) (50 mL/Hr) IV Continuous <Continuous>  dextrose 5%. 1000 milliLiter(s) (100 mL/Hr) IV Continuous <Continuous>  dextrose 50% Injectable 25 Gram(s) IV Push once  dextrose 50% Injectable 12.5 Gram(s) IV Push once  dextrose 50% Injectable 25 Gram(s) IV Push once  diltiazem    milliGRAM(s) Oral daily  DULoxetine 60 milliGRAM(s) Oral daily  epoetin juan-epbx (RETACRIT) Injectable 84554 Unit(s) IV Push <User Schedule>  glucagon  Injectable 1 milliGRAM(s) IntraMuscular once  heparin   Injectable 5000 Unit(s) SubCutaneous every 8 hours  hydrALAZINE 25 milliGRAM(s) Oral every 12 hours  insulin lispro (ADMELOG) corrective regimen sliding scale   SubCutaneous two times a day with meals  isosorbide   mononitrate ER Tablet (IMDUR) 30 milliGRAM(s) Oral daily  melatonin 6 milliGRAM(s) Oral at bedtime  Nephro-pito 1 Tablet(s) Oral daily  pantoprazole    Tablet 40 milliGRAM(s) Oral two times a day  polyethylene glycol 3350 17 Gram(s) Oral daily  senna 2 Tablet(s) Oral at bedtime  tamsulosin 0.4 milliGRAM(s) Oral at bedtime    MEDICATIONS  (PRN):  acetaminophen   Tablet .. 650 milliGRAM(s) Oral every 6 hours PRN Temp greater or equal to 38C (100.4F), Mild Pain (1 - 3)  artificial  tears Solution 1 Drop(s) Both EYES two times a day PRN Dry Eyes      Vital Signs Last 24 Hrs  T(C): 36.6 (17 Apr 2021 08:16), Max: 36.7 (16 Apr 2021 20:14)  T(F): 97.9 (17 Apr 2021 08:16), Max: 98.1 (16 Apr 2021 20:14)  HR: 66 (17 Apr 2021 08:16) (61 - 69)  BP: 169/70 (17 Apr 2021 08:16) (118/48 - 169/70)  BP(mean): 50 (16 Apr 2021 17:40) (50 - 50)  RR: 15 (17 Apr 2021 08:16) (15 - 16)  SpO2: 96% (17 Apr 2021 08:16) (96% - 97%)  CAPILLARY BLOOD GLUCOSE      POCT Blood Glucose.: 103 mg/dL (17 Apr 2021 07:57)  POCT Blood Glucose.: 103 mg/dL (16 Apr 2021 17:56)    I&O's Summary    16 Apr 2021 07:01  -  17 Apr 2021 07:00  --------------------------------------------------------  IN: 0 mL / OUT: 2200 mL / NET: -2200 mL        PHYSICAL EXAM:  GENERAL: NAD, Elderly male  HEAD:  Atraumatic, Normocephalic  EYES: EOMI, PERRLA, conjunctiva and sclera clear  CHEST/LUNG: Clear to auscultation bilaterally; No wheeze, nonlabored breathing  HEART: Regular rate and rhythm; No murmurs, rubs, or gallops  ABDOMEN: Soft, Nontender, Nondistended; Bowel sounds present  EXTREMITIES:  2+ Peripheral Pulses, No clubbing, cyanosis. B/L LE 2+ Pitting edema  NEUROLOGY: AAOx3, slightly confused this AM. Non-focal  PSYCH: calm, appropriate mood  SKIN: NANCY SMALLS Surgical site c/d/i    LABS:                        10.2   7.15  )-----------( 302      ( 16 Apr 2021 14:20 )             31.6     04-16    135  |  99  |  42<H>  ----------------------------<  129<H>  4.2   |  29  |  4.66<H>    Ca    8.9      16 Apr 2021 14:20  Phos  5.1     04-16    TPro  x   /  Alb  2.3<L>  /  TBili  x   /  DBili  x   /  AST  x   /  ALT  x   /  AlkPhos  x   04-16              RADIOLOGY & ADDITIONAL TESTS:  Results Reviewed:   Imaging Personally Reviewed:  Electrocardiogram Personally Reviewed:    COORDINATION OF CARE:  Care Discussed with Consultants/Other Providers [Y/N]:  Prior or Outpatient Records Reviewed [Y/N]:

## 2021-04-17 NOTE — PROGRESS NOTE ADULT - ASSESSMENT
85 y/o M with extensive PMH including HTN, HLD, CAD, HFpEF, s/p Right CEA for Carotid artery disease, ESRD on HD 2d/week (M/Fri) via LUE fistula, s/p flecainide w/ ILR placed 6/2020, AS s/p TAVR, and PAD (RLE fem-pop bypass October 2020) who presented to [facility] on 3/13/21 with GI bleed, as well as LLE pain due to occulsion of L superficial fem artery.  He had a complicated hospital course and also had acute DVT, PNA, bilateral pleural effusions.  Pt apparently was not a candidate for an IVC filter.  #ESRD on HD  -HD MWF  #PAD  -s/p fem-pop bypass and revision  -Continue Plavix    #Acute left femoral vein DVT  -Not on full dose AC due to GIB and high risk. If H/H remains stable and pt without recurrent episodes, we may restart AC later on. Recommend discussing with family  -DVT ppx with HSQ  -Per chart - patient not a candidate for IVC filter??    #Acute blood loss anemia, stable, likely due to UGIB  -EGD showed multiple AVMs, cauterized during EGD  -Protonix 40mg po bid  -Monitor Hg/Hct, transfuse if Hg <8  -EPO with dialysis on M + F    #Chronic diastolic CHF, stable  #Bioprosthetic aortic valve  -TTE done 3/14/2021; reviewed reading  -AV normally functioning  -continue Cardizem 240mg daily    #HTN  -Chronic fluctuating SBP  -Unclear why the dosage and frequency of Hydralazine was decreased. Will continue 25mg BID for now and closely montor BP  -C/w Imdur w/ holding parameters  -Continue Cardizem   -Monitor vitals    #DM-II, A1c 5.7 3/26  - ISS  - fingersticks q ac and hs    #BPH  -continue Flomax

## 2021-04-18 LAB
GLUCOSE BLDC GLUCOMTR-MCNC: 115 MG/DL — HIGH (ref 70–99)
GLUCOSE BLDC GLUCOMTR-MCNC: 167 MG/DL — HIGH (ref 70–99)
SARS-COV-2 RNA SPEC QL NAA+PROBE: SIGNIFICANT CHANGE UP

## 2021-04-18 PROCEDURE — 99232 SBSQ HOSP IP/OBS MODERATE 35: CPT

## 2021-04-18 RX ADMIN — HEPARIN SODIUM 5000 UNIT(S): 5000 INJECTION INTRAVENOUS; SUBCUTANEOUS at 05:44

## 2021-04-18 RX ADMIN — PANTOPRAZOLE SODIUM 40 MILLIGRAM(S): 20 TABLET, DELAYED RELEASE ORAL at 05:44

## 2021-04-18 RX ADMIN — Medication 2: at 17:21

## 2021-04-18 RX ADMIN — CLOPIDOGREL BISULFATE 75 MILLIGRAM(S): 75 TABLET, FILM COATED ORAL at 12:21

## 2021-04-18 RX ADMIN — HEPARIN SODIUM 5000 UNIT(S): 5000 INJECTION INTRAVENOUS; SUBCUTANEOUS at 21:32

## 2021-04-18 RX ADMIN — Medication 240 MILLIGRAM(S): at 05:43

## 2021-04-18 RX ADMIN — ISOSORBIDE MONONITRATE 30 MILLIGRAM(S): 60 TABLET, EXTENDED RELEASE ORAL at 12:21

## 2021-04-18 RX ADMIN — Medication 1 TABLET(S): at 12:21

## 2021-04-18 RX ADMIN — Medication 6 MILLIGRAM(S): at 21:31

## 2021-04-18 RX ADMIN — TAMSULOSIN HYDROCHLORIDE 0.4 MILLIGRAM(S): 0.4 CAPSULE ORAL at 21:32

## 2021-04-18 RX ADMIN — PANTOPRAZOLE SODIUM 40 MILLIGRAM(S): 20 TABLET, DELAYED RELEASE ORAL at 17:21

## 2021-04-18 RX ADMIN — HEPARIN SODIUM 5000 UNIT(S): 5000 INJECTION INTRAVENOUS; SUBCUTANEOUS at 14:13

## 2021-04-18 RX ADMIN — Medication 25 MILLIGRAM(S): at 05:43

## 2021-04-18 RX ADMIN — SENNA PLUS 2 TABLET(S): 8.6 TABLET ORAL at 21:32

## 2021-04-18 RX ADMIN — DULOXETINE HYDROCHLORIDE 60 MILLIGRAM(S): 30 CAPSULE, DELAYED RELEASE ORAL at 12:21

## 2021-04-18 NOTE — PROGRESS NOTE ADULT - SUBJECTIVE AND OBJECTIVE BOX
HPI:  Pt is a 85 y/o M with PMHx of HTN, HLD, CAD, HFpEF, s/p Right CEA for Carotid artery disease, ESRD on HD 2d/week (M/Fri) via LUE fistula, s/p flecainide w/ ILR placed 6/2020, AS s/p TAVR, and PAD (RLE fem-pop bypass October 2020) who presented to [facility] on 3/13/21 with GI bleed, as well as LLE pain. 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 When patient was stable from GI, medical, cardiac standpoint, he went for LLE angiogram on 3/18, and found occlusion of L Superficial femoral artery. He was planned for a LLE fem-pop bypass, and had the bypass performed on 3/20. On 3/22, patient was a RRT for AMS, fever. Work-up revealed pleural effusions and L femoral DVT. He was started on abx for presumed pna and hep gtt for DVT. Patient began to show signs of malperfusion with coolness to touch, mottling of skin to LLE. He was planned for revision of LLE bypass. Went to OR on 3/25 for LLE bypass revision with explant of PTFE bypass, CFA to AT bypass with cryovein performed. Intra-op patient with 1500 ml of blood loss requiring 8 unit PRBC, 4 unit FFP, 1 donor unit plt.  During procedure, pt lost function of PPM with period of asystole requiring transcutaneous pacing. Cardiology consulted and pacemaker interrogated in the OR.  Pt taken to PACU post op and SICU consulted. Patient went to SICU post-op. On 3/27 patient was extubated and was downgraded from SICU to floor the next day. Rest of hospital course was uneventful. He worked with PT, was evaluated by PM&R and approved for acute rehab. The patient remained on heparin sq while in the hospital for tx of dvt (his plavix was held). Upon discharge, approved by CHALINO and Dr. Albrecht to resume plavix. He will remain on heparin sq while at rehab for dvt tx and ppx. He is not a candidate for IVC filter. Upon discharge, pain is well controlled, tolerating diet, remains HD stable, no clinical signs/symptoms of GI bleed, LLE remains warm and well perfused, he has a strong LLE palpable graft pulse and dopplerable signals. Per attending, he is medically stable for discharge to AR and on 4/8/21 he was transferred to [facility] for acute inpatient rehabilitation.    SUBJECTIVE / INTERVAL HPI: Patient seen and examined this morning at bedside. Patient in bed in NAD, no SOB, no n/v, left leg pain controlled.     REVIEW OF SYSTEMS:    CONSTITUTIONAL: No fevers   EYES/ENT: No vertigo or throat pain   RESPIRATORY: No cough or shortness of breath  CARDIOVASCULAR: No chest pain   GASTROINTESTINAL: No abdominal or epigastric pain. No nausea or vomiting      VITAL SIGNS:  Vital Signs Last 24 Hrs  T(C): 36.3 (18 Apr 2021 08:32), Max: 36.4 (17 Apr 2021 19:56)  T(F): 97.4 (18 Apr 2021 08:32), Max: 97.5 (17 Apr 2021 19:56)  HR: 69 (18 Apr 2021 08:32) (61 - 69)  BP: 142/61 (18 Apr 2021 08:32) (115/67 - 158/77)  BP(mean): --  RR: 16 (18 Apr 2021 08:32) (16 - 16)  SpO2: 98% (18 Apr 2021 08:32) (98% - 98%)    PHYSICAL EXAM:    General: NAD  HEENT: NC/AT; MMM  Neck: supple  Cardiovascular: +S1/S2, RRR  Respiratory: CTA B/L, but breath sounds are decreased at bases  Gastrointestinal: soft, NT/ND  Extremities: LUE AV fistula intact, LLE with edema, dressed  Neurological: AAOx2, Motor exam grossly intact, no sensory deficits      MEDICATIONS:  MEDICATIONS  (STANDING):  clopidogrel Tablet 75 milliGRAM(s) Oral daily  dextrose 40% Gel 15 Gram(s) Oral once  dextrose 5%. 1000 milliLiter(s) (50 mL/Hr) IV Continuous <Continuous>  dextrose 5%. 1000 milliLiter(s) (100 mL/Hr) IV Continuous <Continuous>  dextrose 50% Injectable 25 Gram(s) IV Push once  dextrose 50% Injectable 12.5 Gram(s) IV Push once  dextrose 50% Injectable 25 Gram(s) IV Push once  diltiazem    milliGRAM(s) Oral daily  DULoxetine 60 milliGRAM(s) Oral daily  epoetin juan-epbx (RETACRIT) Injectable 23381 Unit(s) IV Push <User Schedule>  glucagon  Injectable 1 milliGRAM(s) IntraMuscular once  heparin   Injectable 5000 Unit(s) SubCutaneous every 8 hours  hydrALAZINE 50 milliGRAM(s) Oral three times a day  insulin lispro (ADMELOG) corrective regimen sliding scale   SubCutaneous two times a day with meals  isosorbide   mononitrate ER Tablet (IMDUR) 30 milliGRAM(s) Oral daily  melatonin 6 milliGRAM(s) Oral at bedtime  Nephro-pito 1 Tablet(s) Oral daily  pantoprazole    Tablet 40 milliGRAM(s) Oral two times a day  polyethylene glycol 3350 17 Gram(s) Oral daily  senna 2 Tablet(s) Oral at bedtime  tamsulosin 0.4 milliGRAM(s) Oral at bedtime    MEDICATIONS  (PRN):  acetaminophen   Tablet .. 650 milliGRAM(s) Oral every 6 hours PRN Temp greater or equal to 38C (100.4F), Mild Pain (1 - 3)  artificial  tears Solution 1 Drop(s) Both EYES two times a day PRN Dry Eyes      ALLERGIES:  Allergies    Plavix (Hives)  Toprol-XL (Rash)    Intolerances        LABS:          LABS:    LABS:      Ca    8.9        16 Apr 2021 14:20                          10.2   7.15  )-----------( 302      ( 16 Apr 2021 14:20 )             31.6     16 Apr 2021 14:20    135    |  99     |  42     ----------------------------<  129    4.2     |  29     |  4.66     Ca    8.9        16 Apr 2021 14:20  Phos  5.1       16 Apr 2021 14:20    TPro  x      /  Alb  2.3    /  TBili  x      /  DBili  x      /  AST  x      /  ALT  x      /  AlkPhos  x      16 Apr 2021 14:20                  11.2   5.92  )-----------( 298      ( 15 Apr 2021 07:32 )             34.8     04-15    136  |  100  |  25<H>  ----------------------------<  120<H>  4.0   |  30  |  3.31<H>    Ca    8.9      15 Apr 2021 07:32          CAPILLARY BLOOD GLUCOSE      POCT Blood Glucose.: 100 mg/dL (16 Apr 2021 07:30)

## 2021-04-18 NOTE — PROGRESS NOTE ADULT - SUBJECTIVE AND OBJECTIVE BOX
Resting    Vital Signs Last 24 Hrs  T(C): 36.3 (04-18-21 @ 08:32), Max: 36.4 (04-17-21 @ 19:56)  T(F): 97.4 (04-18-21 @ 08:32), Max: 97.5 (04-17-21 @ 19:56)  HR: 69 (04-18-21 @ 08:32) (64 - 69)  BP: 110/58 (04-18-21 @ 17:27) (110/58 - 153/69)  RR: 16 (04-18-21 @ 08:32) (16 - 16)  SpO2: 98% (04-18-21 @ 08:32) (98% - 98%)    s1s2  b/l air entry  soft  tr edema b/l LE, LUE AVF patent                                                             acetaminophen   Tablet .. 650 milliGRAM(s) Oral every 6 hours PRN  artificial  tears Solution 1 Drop(s) Both EYES two times a day PRN  clopidogrel Tablet 75 milliGRAM(s) Oral daily  dextrose 40% Gel 15 Gram(s) Oral once  dextrose 5%. 1000 milliLiter(s) IV Continuous <Continuous>  dextrose 5%. 1000 milliLiter(s) IV Continuous <Continuous>  dextrose 50% Injectable 25 Gram(s) IV Push once  dextrose 50% Injectable 12.5 Gram(s) IV Push once  dextrose 50% Injectable 25 Gram(s) IV Push once  diltiazem    milliGRAM(s) Oral daily  DULoxetine 60 milliGRAM(s) Oral daily  epoetin juan-epbx (RETACRIT) Injectable 77724 Unit(s) IV Push <User Schedule>  glucagon  Injectable 1 milliGRAM(s) IntraMuscular once  heparin   Injectable 5000 Unit(s) SubCutaneous every 8 hours  hydrALAZINE 25 milliGRAM(s) Oral every 12 hours  insulin lispro (ADMELOG) corrective regimen sliding scale   SubCutaneous two times a day with meals  isosorbide   mononitrate ER Tablet (IMDUR) 30 milliGRAM(s) Oral daily  melatonin 6 milliGRAM(s) Oral at bedtime  Nephro-pito 1 Tablet(s) Oral daily  pantoprazole    Tablet 40 milliGRAM(s) Oral two times a day  polyethylene glycol 3350 17 Gram(s) Oral daily  senna 2 Tablet(s) Oral at bedtime  tamsulosin 0.4 milliGRAM(s) Oral at bedtime    A/P:    S/p complicated hospital course as per HPI  ESRD on HD  HD MWF  TMP as able  Renal diet  Rehab    [phone]

## 2021-04-18 NOTE — PROGRESS NOTE ADULT - ASSESSMENT
Pt is a 85 y/o M with PMHx of HTN, HLD, CAD, HFpEF, s/p Right CEA for Carotid artery disease, ESRD on HD 2d/week (M/Fri) via LUE fistula, s/p flecainide w/ ILR placed 6/2020, AS s/p TAVR, and PAD who presented to [facility] on 3/13/21 with GI bleed, as well as LLE pain, found to have occlusion of left superficial femoral artery. Pt underwent EGD and AVM cauterizations. He also underwent left fem-pop bypass x2 and was found to have LLE DVT, treated with heparin. He has functional deficits of with his ambulation and his endurance.      Comprehensive rehab program: -PT/OT/ST-total of 3 hrs/day 5 days/week     #PAD:  - Continue Plavix 75mg  - s/p fem-pop bypass and revision  - Completed Keflex 500mg bid for possible superficial infection    #DVT  - left femoral vein DVT  - on Heparin sq tid  - Per chart - patient not a candidate for IVC filter    #UGIB  - egd showed multiple AVMs, cauterized during EGD  - Protonix bid    #CKD  - on M/F dialysis with extra dialysis intermittently  - Renal following  - Epoetin Yanick with dialysis    #CHF  - continue Cardizem 240mg daily  - Torsemide d/c by renal  - 4/9   - CXR with Pleural effusion  - Imdur 30mg daily  - Hospitalist note appreciated    #HTN  - continue Hydralazine changed to 50mg tid     #DM  - ISS  - FS has been controlled - FS bid     #Pain  - Cymbalta 60mg daily  - Tylenol PRN, especially before therapy   - Ice to left knee    #BPH  - continue Flomax   - Patient with some spontaneous void     #GI  - Miralax, senna    #Skin  - Bilateral heel pressure ulcers DTI: off-load heels  - Sacrum and buttock decubitus ulcers: were unstageable, starting to heal  - left calf dressing change BID and PRN - medial with suture and lateral with sutures  - staples present at groin and lateral thigh    #Diet  - Soft and bite sized  - Consistent Carb, DASH/TLC, 1500mL fluid restriction, Renal diet    Labs:  CBC, BMP 4/19

## 2021-04-18 NOTE — PROGRESS NOTE ADULT - SUBJECTIVE AND OBJECTIVE BOX
Patient is a 86y old  Male who presents with a chief complaint of Impairment group -13 - PAD (18 Apr 2021 08:40)      SUBJECTIVE / OVERNIGHT EVENTS:  Pt seen and examined at bedside. No acute events overnight.  Pt denies cp, palpitations, sob, abd pain, N/V, fever, chills.    ROS:  All other review of systems negative    Allergies    Plavix (Hives)  Toprol-XL (Rash)    Intolerances        MEDICATIONS  (STANDING):  clopidogrel Tablet 75 milliGRAM(s) Oral daily  dextrose 40% Gel 15 Gram(s) Oral once  dextrose 5%. 1000 milliLiter(s) (50 mL/Hr) IV Continuous <Continuous>  dextrose 5%. 1000 milliLiter(s) (100 mL/Hr) IV Continuous <Continuous>  dextrose 50% Injectable 25 Gram(s) IV Push once  dextrose 50% Injectable 12.5 Gram(s) IV Push once  dextrose 50% Injectable 25 Gram(s) IV Push once  diltiazem    milliGRAM(s) Oral daily  DULoxetine 60 milliGRAM(s) Oral daily  epoetin juan-epbx (RETACRIT) Injectable 77001 Unit(s) IV Push <User Schedule>  glucagon  Injectable 1 milliGRAM(s) IntraMuscular once  heparin   Injectable 5000 Unit(s) SubCutaneous every 8 hours  hydrALAZINE 25 milliGRAM(s) Oral every 12 hours  insulin lispro (ADMELOG) corrective regimen sliding scale   SubCutaneous two times a day with meals  isosorbide   mononitrate ER Tablet (IMDUR) 30 milliGRAM(s) Oral daily  melatonin 6 milliGRAM(s) Oral at bedtime  Nephro-pito 1 Tablet(s) Oral daily  pantoprazole    Tablet 40 milliGRAM(s) Oral two times a day  polyethylene glycol 3350 17 Gram(s) Oral daily  senna 2 Tablet(s) Oral at bedtime  tamsulosin 0.4 milliGRAM(s) Oral at bedtime    MEDICATIONS  (PRN):  acetaminophen   Tablet .. 650 milliGRAM(s) Oral every 6 hours PRN Temp greater or equal to 38C (100.4F), Mild Pain (1 - 3)  artificial  tears Solution 1 Drop(s) Both EYES two times a day PRN Dry Eyes      Vital Signs Last 24 Hrs  T(C): 36.3 (18 Apr 2021 08:32), Max: 36.4 (17 Apr 2021 19:56)  T(F): 97.4 (18 Apr 2021 08:32), Max: 97.5 (17 Apr 2021 19:56)  HR: 69 (18 Apr 2021 08:32) (61 - 69)  BP: 142/61 (18 Apr 2021 08:32) (115/67 - 158/77)  BP(mean): --  RR: 16 (18 Apr 2021 08:32) (16 - 16)  SpO2: 98% (18 Apr 2021 08:32) (98% - 98%)  CAPILLARY BLOOD GLUCOSE      POCT Blood Glucose.: 115 mg/dL (18 Apr 2021 07:47)  POCT Blood Glucose.: 194 mg/dL (17 Apr 2021 16:42)    I&O's Summary      PHYSICAL EXAM:  GENERAL: NAD, Elderly male  HEAD:  Atraumatic, Normocephalic  EYES: EOMI, PERRLA, conjunctiva and sclera clear  CHEST/LUNG: Clear to auscultation bilaterally; No wheeze, nonlabored breathing  HEART: Regular rate and rhythm; No murmurs, rubs, or gallops  ABDOMEN: Soft, Nontender, Nondistended; Bowel sounds present  EXTREMITIES:  2+ Peripheral Pulses, No clubbing, cyanosis. B/L LE 2+ Pitting edema  NEUROLOGY: AAOx3, slightly confused this AM. Non-focal  PSYCH: calm, appropriate mood  SKIN: RADE AVF, LLE Surgical site c/d/i    LABS:                        10.2   7.15  )-----------( 302      ( 16 Apr 2021 14:20 )             31.6     04-16    135  |  99  |  42<H>  ----------------------------<  129<H>  4.2   |  29  |  4.66<H>    Ca    8.9      16 Apr 2021 14:20  Phos  5.1     04-16    TPro  x   /  Alb  2.3<L>  /  TBili  x   /  DBili  x   /  AST  x   /  ALT  x   /  AlkPhos  x   04-16              RADIOLOGY & ADDITIONAL TESTS:  Results Reviewed:   Imaging Personally Reviewed:  Electrocardiogram Personally Reviewed:    COORDINATION OF CARE:  Care Discussed with Consultants/Other Providers [Y/N]:  Prior or Outpatient Records Reviewed [Y/N]:

## 2021-04-18 NOTE — PROGRESS NOTE ADULT - ASSESSMENT
85 y/o M with extensive PMH including HTN, HLD, CAD, HFpEF, s/p Right CEA for Carotid artery disease, ESRD on HD 2d/week (M/Fri) via LUE fistula, s/p flecainide w/ ILR placed 6/2020, AS s/p TAVR, and PAD (RLE fem-pop bypass October 2020) who presented to [facility] on 3/13/21 with GI bleed, as well as LLE pain due to occulsion of L superficial fem artery.  He had a complicated hospital course and also had acute DVT, PNA, bilateral pleural effusions.  Pt apparently was not a candidate for an IVC filter.  #ESRD on HD  -HD MWF  #PAD  -s/p fem-pop bypass and revision  -Continue Plavix    #Acute left femoral vein DVT  -Not on full dose AC due to GIB and high risk. If H/H remains stable and pt without recurrent episodes, we may restart AC later on. Recommend discussing with family  -DVT ppx with HSQ  -Per chart - patient not a candidate for IVC filter??    #Acute blood loss anemia, stable, likely due to UGIB  -EGD showed multiple AVMs, cauterized during EGD  -Protonix 40mg po bid  -Monitor Hg/Hct, transfuse if Hg <8  -EPO with dialysis on M + F    #Chronic diastolic CHF, stable  #Bioprosthetic aortic valve  -TTE done 3/14/2021; reviewed reading  -AV normally functioning  -continue Cardizem 240mg daily    #HTN  -Chronic fluctuating SBP  -Continue Hydralazine 25mg BID for now and closely monitor BP  -C/w Imdur w/ holding parameters  -Continue Cardizem   -Monitor vitals    #DM-II, A1c 5.7 3/26  - ISS  - fingersticks q ac and hs    #BPH  -continue Flomax

## 2021-04-19 ENCOUNTER — APPOINTMENT (OUTPATIENT)
Dept: ELECTROPHYSIOLOGY | Facility: CLINIC | Age: 86
End: 2021-04-19

## 2021-04-19 LAB
ANION GAP SERPL CALC-SCNC: 10 MMOL/L — SIGNIFICANT CHANGE UP (ref 5–17)
BUN SERPL-MCNC: 46 MG/DL — HIGH (ref 7–23)
CALCIUM SERPL-MCNC: 8.8 MG/DL — SIGNIFICANT CHANGE UP (ref 8.4–10.5)
CHLORIDE SERPL-SCNC: 99 MMOL/L — SIGNIFICANT CHANGE UP (ref 96–108)
CO2 SERPL-SCNC: 28 MMOL/L — SIGNIFICANT CHANGE UP (ref 22–31)
CREAT SERPL-MCNC: 5.08 MG/DL — HIGH (ref 0.5–1.3)
GLUCOSE BLDC GLUCOMTR-MCNC: 153 MG/DL — HIGH (ref 70–99)
GLUCOSE BLDC GLUCOMTR-MCNC: 99 MG/DL — SIGNIFICANT CHANGE UP (ref 70–99)
GLUCOSE SERPL-MCNC: 97 MG/DL — SIGNIFICANT CHANGE UP (ref 70–99)
HCT VFR BLD CALC: 30.5 % — LOW (ref 39–50)
HGB BLD-MCNC: 9.6 G/DL — LOW (ref 13–17)
MCHC RBC-ENTMCNC: 30.1 PG — SIGNIFICANT CHANGE UP (ref 27–34)
MCHC RBC-ENTMCNC: 31.5 GM/DL — LOW (ref 32–36)
MCV RBC AUTO: 95.6 FL — SIGNIFICANT CHANGE UP (ref 80–100)
NRBC # BLD: 0 /100 WBCS — SIGNIFICANT CHANGE UP (ref 0–0)
PLATELET # BLD AUTO: 260 K/UL — SIGNIFICANT CHANGE UP (ref 150–400)
POTASSIUM SERPL-MCNC: 4.3 MMOL/L — SIGNIFICANT CHANGE UP (ref 3.5–5.3)
POTASSIUM SERPL-SCNC: 4.3 MMOL/L — SIGNIFICANT CHANGE UP (ref 3.5–5.3)
RBC # BLD: 3.19 M/UL — LOW (ref 4.2–5.8)
RBC # FLD: 17.3 % — HIGH (ref 10.3–14.5)
SODIUM SERPL-SCNC: 137 MMOL/L — SIGNIFICANT CHANGE UP (ref 135–145)
WBC # BLD: 5.33 K/UL — SIGNIFICANT CHANGE UP (ref 3.8–10.5)
WBC # FLD AUTO: 5.33 K/UL — SIGNIFICANT CHANGE UP (ref 3.8–10.5)

## 2021-04-19 PROCEDURE — 99233 SBSQ HOSP IP/OBS HIGH 50: CPT | Mod: GC

## 2021-04-19 PROCEDURE — 99232 SBSQ HOSP IP/OBS MODERATE 35: CPT

## 2021-04-19 RX ORDER — POVIDONE-IODINE 5 %
1 AEROSOL (ML) TOPICAL DAILY
Refills: 0 | Status: DISCONTINUED | OUTPATIENT
Start: 2021-04-19 | End: 2021-04-19

## 2021-04-19 RX ADMIN — CLOPIDOGREL BISULFATE 75 MILLIGRAM(S): 75 TABLET, FILM COATED ORAL at 11:38

## 2021-04-19 RX ADMIN — Medication 1 TABLET(S): at 11:38

## 2021-04-19 RX ADMIN — HEPARIN SODIUM 5000 UNIT(S): 5000 INJECTION INTRAVENOUS; SUBCUTANEOUS at 05:23

## 2021-04-19 RX ADMIN — PANTOPRAZOLE SODIUM 40 MILLIGRAM(S): 20 TABLET, DELAYED RELEASE ORAL at 18:26

## 2021-04-19 RX ADMIN — Medication 1 APPLICATION(S): at 14:20

## 2021-04-19 RX ADMIN — TAMSULOSIN HYDROCHLORIDE 0.4 MILLIGRAM(S): 0.4 CAPSULE ORAL at 21:10

## 2021-04-19 RX ADMIN — Medication 240 MILLIGRAM(S): at 05:25

## 2021-04-19 RX ADMIN — PANTOPRAZOLE SODIUM 40 MILLIGRAM(S): 20 TABLET, DELAYED RELEASE ORAL at 05:24

## 2021-04-19 RX ADMIN — HEPARIN SODIUM 5000 UNIT(S): 5000 INJECTION INTRAVENOUS; SUBCUTANEOUS at 13:31

## 2021-04-19 RX ADMIN — DULOXETINE HYDROCHLORIDE 60 MILLIGRAM(S): 30 CAPSULE, DELAYED RELEASE ORAL at 11:38

## 2021-04-19 RX ADMIN — SENNA PLUS 2 TABLET(S): 8.6 TABLET ORAL at 21:10

## 2021-04-19 RX ADMIN — Medication 25 MILLIGRAM(S): at 05:25

## 2021-04-19 RX ADMIN — Medication 25 MILLIGRAM(S): at 18:25

## 2021-04-19 RX ADMIN — Medication 6 MILLIGRAM(S): at 21:10

## 2021-04-19 RX ADMIN — ERYTHROPOIETIN 10000 UNIT(S): 10000 INJECTION, SOLUTION INTRAVENOUS; SUBCUTANEOUS at 14:59

## 2021-04-19 RX ADMIN — Medication 650 MILLIGRAM(S): at 16:24

## 2021-04-19 RX ADMIN — Medication 2: at 08:09

## 2021-04-19 RX ADMIN — HEPARIN SODIUM 5000 UNIT(S): 5000 INJECTION INTRAVENOUS; SUBCUTANEOUS at 21:10

## 2021-04-19 NOTE — PROGRESS NOTE ADULT - SUBJECTIVE AND OBJECTIVE BOX
Patient is a 86y old  Male who presents with a chief complaint of Impairment group -13 - PAD (19 Apr 2021 10:08)      Patient seen and examined at bedside.    ALLERGIES:  Plavix (Hives)  Toprol-XL (Rash)    MEDICATIONS  (STANDING):  clopidogrel Tablet 75 milliGRAM(s) Oral daily  dextrose 40% Gel 15 Gram(s) Oral once  dextrose 5%. 1000 milliLiter(s) (50 mL/Hr) IV Continuous <Continuous>  dextrose 5%. 1000 milliLiter(s) (100 mL/Hr) IV Continuous <Continuous>  dextrose 50% Injectable 25 Gram(s) IV Push once  dextrose 50% Injectable 12.5 Gram(s) IV Push once  dextrose 50% Injectable 25 Gram(s) IV Push once  diltiazem    milliGRAM(s) Oral daily  DULoxetine 60 milliGRAM(s) Oral daily  epoetin juan-epbx (RETACRIT) Injectable 58584 Unit(s) IV Push <User Schedule>  glucagon  Injectable 1 milliGRAM(s) IntraMuscular once  heparin   Injectable 5000 Unit(s) SubCutaneous every 8 hours  hydrALAZINE 25 milliGRAM(s) Oral every 12 hours  insulin lispro (ADMELOG) corrective regimen sliding scale   SubCutaneous two times a day with meals  isosorbide   mononitrate ER Tablet (IMDUR) 30 milliGRAM(s) Oral daily  melatonin 6 milliGRAM(s) Oral at bedtime  Nephro-pito 1 Tablet(s) Oral daily  pantoprazole    Tablet 40 milliGRAM(s) Oral two times a day  polyethylene glycol 3350 17 Gram(s) Oral daily  senna 2 Tablet(s) Oral at bedtime  tamsulosin 0.4 milliGRAM(s) Oral at bedtime    MEDICATIONS  (PRN):  acetaminophen   Tablet .. 650 milliGRAM(s) Oral every 6 hours PRN Temp greater or equal to 38C (100.4F), Mild Pain (1 - 3)  artificial  tears Solution 1 Drop(s) Both EYES two times a day PRN Dry Eyes    Vital Signs Last 24 Hrs  T(F): 97.8 (19 Apr 2021 08:38), Max: 98.3 (18 Apr 2021 19:49)  HR: 67 (19 Apr 2021 08:38) (65 - 70)  BP: 140/72 (19 Apr 2021 08:38) (110/58 - 140/72)  RR: 15 (19 Apr 2021 08:38) (14 - 15)  SpO2: 98% (19 Apr 2021 08:38) (96% - 98%)  I&O's Summary      PHYSICAL EXAM:  General: NAD, A/O x 3  ENT: MMM  Neck: Supple, No JVD  Lungs: Clear to auscultation bilaterally  Cardio: RRR, S1/S2, No murmurs  Abdomen: Soft, Nontender, Nondistended; Bowel sounds present  Extremities: No calf tenderness, No pitting edema    LABS:                        9.6    5.33  )-----------( 260      ( 19 Apr 2021 06:00 )             30.5       04-19    137  |  99  |  46  ----------------------------<  97  4.3   |  28  |  5.08    Ca    8.8      19 Apr 2021 06:00  Phos  5.1     04-16    TPro  x   /  Alb  2.3  /  TBili  x   /  DBili  x   /  AST  x   /  ALT  x   /  AlkPhos  x   04-16     eGFR if Non African American: 10 mL/min/1.73M2 (04-19-21 @ 06:00)  eGFR if : 11 mL/min/1.73M2 (04-19-21 @ 06:00)                           POCT Blood Glucose.: 153 mg/dL (19 Apr 2021 07:54)  POCT Blood Glucose.: 167 mg/dL (18 Apr 2021 16:52)            RADIOLOGY & ADDITIONAL TESTS:    Care Discussed with Consultants/Other Providers:

## 2021-04-19 NOTE — PROGRESS NOTE ADULT - ATTENDING COMMENTS
Chart reviewed. Patient seen at  bedside  Denies any new pain or numbness over weekend.   Denies dyspnea/tachypnea. Appears comfortable.   Activity tolerance and endurance improved compared to admission. Now walking with a walker with min assist, but keeps knee flexed and no heel strike- Discussed with PT about Ankle stretching    2. Left heel DTI appears larger- will start betadine daily. Continue off loading . Podiatry consult     3. Continue rehab program.   Hospitalist bernarda noted Chart reviewed. Patient seen at  bedside  Denies any new pain or numbness over weekend.   Denies dyspnea/tachypnea. Appears comfortable.   Activity tolerance and endurance improved compared to admission. Now walking with a walker with min assist, but keeps knee flexed and no heel strike- Discussed with PT about Ankle stretching    2. Left heel DTI appears larger- will start betadine daily. Continue off loading . Podiatry consult     3. Continue rehab program.   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 No increase in local temp, Foot warm to touch. Heel- skin peeling noted,  Will request Vascular surgery consult for any additional recs. Continue off loading heel and elevation.      -- Appeal letter for denial of IRF stay written

## 2021-04-19 NOTE — PROGRESS NOTE ADULT - SUBJECTIVE AND OBJECTIVE BOX
HPI:  Pt is a 87 y/o M with PMHx of HTN, HLD, CAD, HFpEF, s/p Right CEA for Carotid artery disease, ESRD on HD 2d/week (M/Fri) via LUE fistula, s/p flecainide w/ ILR placed 6/2020, AS s/p TAVR, and PAD (RLE fem-pop bypass October 2020) who presented to [facility] on 3/13/21 with GI bleed, as well as LLE pain. 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 When patient was stable from GI, medical, cardiac standpoint, he went for LLE angiogram on 3/18, and found occlusion of L Superficial femoral artery. He was planned for a LLE fem-pop bypass, and had the bypass performed on 3/20. On 3/22, patient was a RRT for AMS, fever. Work-up revealed pleural effusions and L femoral DVT. He was started on abx for presumed pna and hep gtt for DVT. Patient began to show signs of malperfusion with coolness to touch, mottling of skin to LLE. He was planned for revision of LLE bypass. Went to OR on 3/25 for LLE bypass revision with explant of PTFE bypass, CFA to AT bypass with cryovein performed. Intra-op patient with 1500 ml of blood loss requiring 8 unit PRBC, 4 unit FFP, 1 donor unit plt.  During procedure, pt lost function of PPM with period of asystole requiring transcutaneous pacing. Cardiology consulted and pacemaker interrogated in the OR.  Pt taken to PACU post op and SICU consulted. Patient went to SICU post-op. On 3/27 patient was extubated and was downgraded from SICU to floor the next day. Rest of hospital course was uneventful. He worked with PT, was evaluated by PM&R and approved for acute rehab. The patient remained on heparin sq while in the hospital for tx of dvt (his plavix was held). Upon discharge, approved by CHALINO and Dr. Albrecht to resume plavix. He will remain on heparin sq while at rehab for dvt tx and ppx. He is not a candidate for IVC filter. Upon discharge, pain is well controlled, tolerating diet, remains HD stable, no clinical signs/symptoms of GI bleed, LLE remains warm and well perfused, he has a strong LLE palpable graft pulse and dopplerable signals. Per attending, he is medically stable for discharge to AR and on 4/8/21 he was transferred to [facility] for acute inpatient rehabilitation.    SUBJECTIVE / INTERVAL HPI: Patient seen and examined at bedside with therapy.  No acute event overnight.  He's in good spirit this morning.  He reports having a jelly donut early morning, and had eggs and velasquez for breakfast.  He ambulated toe touch secondary to pain + RW with 1 person assist.  He ambulated to bathroom for BM - had 2 BM today of soft consistency.       REVIEW OF SYSTEMS:  CONSTITUTIONAL: No fevers or chills  EYES/ENT: No visual changes;  No vertigo or throat pain   NECK: No pain or stiffness  RESPIRATORY: No cough, wheezing, or shortness of breath  CARDIOVASCULAR: No chest pain or palpitations  GASTROINTESTINAL: No abdominal or epigastric pain. No nausea or vomiting. No diarrhea or constipation.   GENITOURINARY: No dysuria, frequency or hematuria  NEUROLOGICAL: No numbness, + weakness  SKIN: No itching, rashes, +left groin and leg incisions      VITAL SIGNS:  Vital Signs Last 24 Hrs  T(C): 36.6 (19 Apr 2021 08:38), Max: 36.8 (18 Apr 2021 19:49)  T(F): 97.8 (19 Apr 2021 08:38), Max: 98.3 (18 Apr 2021 19:49)  HR: 67 (19 Apr 2021 08:38) (65 - 70)  BP: 140/72 (19 Apr 2021 08:38) (110/58 - 140/72)  BP(mean): --  RR: 15 (19 Apr 2021 08:38) (14 - 15)  SpO2: 98% (19 Apr 2021 08:38) (96% - 98%)    PHYSICAL EXAM:  General: NAD, sitting in wheelchair comfortably  HEENT: NC/AT; PERRL, anicteric sclera; MMM  Neck: supple  Cardiovascular: +S1/S2, RRR  Respiratory: CTA B/L, but breath sounds are decreased at bases  Gastrointestinal: soft, NT/ND  Extremities: LUE AV fistula intact, bilateral lower extremities with edema  Neurological: AAOx2, Motor exam grossly intact, no sensory deficits  Skin: Decubitus bilateral heel pressure ulcers DTI (appears worse today), Left groin and lateral thigh with staples. Left calf dressing with serosanguinous drainage, changed - incisions: lateral (suture) and medial (staples).  Gangrenous toes on left foot. Decubitus sacral and buttock ulcers present- unstageable    MEDICATIONS:  MEDICATIONS  (STANDING):  clopidogrel Tablet 75 milliGRAM(s) Oral daily  dextrose 40% Gel 15 Gram(s) Oral once  dextrose 5%. 1000 milliLiter(s) (50 mL/Hr) IV Continuous <Continuous>  dextrose 5%. 1000 milliLiter(s) (100 mL/Hr) IV Continuous <Continuous>  dextrose 50% Injectable 25 Gram(s) IV Push once  dextrose 50% Injectable 12.5 Gram(s) IV Push once  dextrose 50% Injectable 25 Gram(s) IV Push once  diltiazem    milliGRAM(s) Oral daily  DULoxetine 60 milliGRAM(s) Oral daily  epoetin juan-epbx (RETACRIT) Injectable 26548 Unit(s) IV Push <User Schedule>  glucagon  Injectable 1 milliGRAM(s) IntraMuscular once  heparin   Injectable 5000 Unit(s) SubCutaneous every 8 hours  hydrALAZINE 25 milliGRAM(s) Oral every 12 hours  insulin lispro (ADMELOG) corrective regimen sliding scale   SubCutaneous two times a day with meals  isosorbide   mononitrate ER Tablet (IMDUR) 30 milliGRAM(s) Oral daily  melatonin 6 milliGRAM(s) Oral at bedtime  Nephro-pito 1 Tablet(s) Oral daily  pantoprazole    Tablet 40 milliGRAM(s) Oral two times a day  polyethylene glycol 3350 17 Gram(s) Oral daily  senna 2 Tablet(s) Oral at bedtime  tamsulosin 0.4 milliGRAM(s) Oral at bedtime    MEDICATIONS  (PRN):  acetaminophen   Tablet .. 650 milliGRAM(s) Oral every 6 hours PRN Temp greater or equal to 38C (100.4F), Mild Pain (1 - 3)  artificial  tears Solution 1 Drop(s) Both EYES two times a day PRN Dry Eyes      ALLERGIES:  Allergies    Plavix (Hives)  Toprol-XL (Rash)    Intolerances        LABS:  LAB                        9.6    5.33  )-----------( 260      ( 19 Apr 2021 06:00 )             30.5     04-19    137  |  99  |  46<H>  ----------------------------<  97  4.3   |  28  |  5.08<H>    Ca    8.8      19 Apr 2021 06:00      CAPILLARY BLOOD GLUCOSE    POCT Blood Glucose.: 153 mg/dL (19 Apr 2021 07:54)  POCT Blood Glucose.: 167 mg/dL (18 Apr 2021 16:52)      RADIOLOGY & ADDITIONAL TESTS: Reviewed. HPI:  Pt is a 87 y/o M with PMHx of HTN, HLD, CAD, HFpEF, s/p Right CEA for Carotid artery disease, ESRD on HD 2d/week (M/Fri) via LUE fistula, s/p flecainide w/ ILR placed 6/2020, AS s/p TAVR, and PAD (RLE fem-pop bypass October 2020) who presented to [facility] on 3/13/21 with GI bleed, as well as LLE pain. 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 When patient was stable from GI, medical, cardiac standpoint, he went for LLE angiogram on 3/18, and found occlusion of L Superficial femoral artery. He was planned for a LLE fem-pop bypass, and had the bypass performed on 3/20. On 3/22, patient was a RRT for AMS, fever. Work-up revealed pleural effusions and L femoral DVT. He was started on abx for presumed pna and hep gtt for DVT. Patient began to show signs of malperfusion with coolness to touch, mottling of skin to LLE. He was planned for revision of LLE bypass. Went to OR on 3/25 for LLE bypass revision with explant of PTFE bypass, CFA to AT bypass with cryovein performed. Intra-op patient with 1500 ml of blood loss requiring 8 unit PRBC, 4 unit FFP, 1 donor unit plt.  During procedure, pt lost function of PPM with period of asystole requiring transcutaneous pacing. Cardiology consulted and pacemaker interrogated in the OR.  Pt taken to PACU post op and SICU consulted. Patient went to SICU post-op. On 3/27 patient was extubated and was downgraded from SICU to floor the next day. Rest of hospital course was uneventful. He worked with PT, was evaluated by PM&R and approved for acute rehab. The patient remained on heparin sq while in the hospital for tx of dvt (his plavix was held). Upon discharge, approved by CHALINO and Dr. Albrecht to resume plavix. He will remain on heparin sq while at rehab for dvt tx and ppx. He is not a candidate for IVC filter. Upon discharge, pain is well controlled, tolerating diet, remains HD stable, no clinical signs/symptoms of GI bleed, LLE remains warm and well perfused, he has a strong LLE palpable graft pulse and dopplerable signals. Per attending, he is medically stable for discharge to AR and on 4/8/21 he was transferred to [facility] for acute inpatient rehabilitation.    SUBJECTIVE / INTERVAL HPI: Patient seen and examined at bedside with therapy.  No acute event overnight.  He's in good spirit this morning.  He reports having a jelly donut early morning, and had eggs and velasquez for breakfast.  He ambulated toe touch secondary to pain + RW with 1 person assist.  He ambulated to bathroom for BM - had 2 BM today of soft consistency.       REVIEW OF SYSTEMS:  CONSTITUTIONAL: No fevers or chills  EYES/ENT: No visual changes;  No vertigo or throat pain   NECK: No pain or stiffness  RESPIRATORY: No cough, wheezing, or shortness of breath  CARDIOVASCULAR: No chest pain or palpitations  GASTROINTESTINAL: No abdominal or epigastric pain. No nausea or vomiting. No diarrhea or constipation.   GENITOURINARY: No dysuria, frequency or hematuria  NEUROLOGICAL: No numbness, + weakness  SKIN: No itching, rashes, +left groin and leg incisions      VITAL SIGNS:  Vital Signs Last 24 Hrs  T(C): 36.6 (19 Apr 2021 08:38), Max: 36.8 (18 Apr 2021 19:49)  T(F): 97.8 (19 Apr 2021 08:38), Max: 98.3 (18 Apr 2021 19:49)  HR: 67 (19 Apr 2021 08:38) (65 - 70)  BP: 140/72 (19 Apr 2021 08:38) (110/58 - 140/72)  BP(mean): --  RR: 15 (19 Apr 2021 08:38) (14 - 15)  SpO2: 98% (19 Apr 2021 08:38) (96% - 98%)    PHYSICAL EXAM:  General: NAD, sitting in wheelchair comfortably  Cardiovascular: +S1/S2  Respiratory: CTA B/L, but breath sounds are decreased at bases  Gastrointestinal: soft, NT/ND  Extremities: LUE AV fistula intact, bilateral lower extremities with edema  Neurological: AAOx2, Motor exam grossly intact, no sensory deficits  Skin: Decubitus bilateral heel pressure ulcers DTI ( left appears worse today), Left groin and lateral thigh with staples. Left calf dressing with serosanguinous drainage, changed - incisions: lateral (suture) and medial (staples).  Gangrenous toes on left foot.   Decubitus sacral and buttock ulcers present- unstageable- improving - stage 2 and healing well     MEDICATIONS:  MEDICATIONS  (STANDING):  clopidogrel Tablet 75 milliGRAM(s) Oral daily  dextrose 40% Gel 15 Gram(s) Oral once  dextrose 5%. 1000 milliLiter(s) (50 mL/Hr) IV Continuous <Continuous>  dextrose 5%. 1000 milliLiter(s) (100 mL/Hr) IV Continuous <Continuous>  dextrose 50% Injectable 25 Gram(s) IV Push once  dextrose 50% Injectable 12.5 Gram(s) IV Push once  dextrose 50% Injectable 25 Gram(s) IV Push once  diltiazem    milliGRAM(s) Oral daily  DULoxetine 60 milliGRAM(s) Oral daily  epoetin juan-epbx (RETACRIT) Injectable 91347 Unit(s) IV Push <User Schedule>  glucagon  Injectable 1 milliGRAM(s) IntraMuscular once  heparin   Injectable 5000 Unit(s) SubCutaneous every 8 hours  hydrALAZINE 25 milliGRAM(s) Oral every 12 hours  insulin lispro (ADMELOG) corrective regimen sliding scale   SubCutaneous two times a day with meals  isosorbide   mononitrate ER Tablet (IMDUR) 30 milliGRAM(s) Oral daily  melatonin 6 milliGRAM(s) Oral at bedtime  Nephro-pito 1 Tablet(s) Oral daily  pantoprazole    Tablet 40 milliGRAM(s) Oral two times a day  polyethylene glycol 3350 17 Gram(s) Oral daily  senna 2 Tablet(s) Oral at bedtime  tamsulosin 0.4 milliGRAM(s) Oral at bedtime    MEDICATIONS  (PRN):  acetaminophen   Tablet .. 650 milliGRAM(s) Oral every 6 hours PRN Temp greater or equal to 38C (100.4F), Mild Pain (1 - 3)  artificial  tears Solution 1 Drop(s) Both EYES two times a day PRN Dry Eyes        LABS:  LAB                        9.6    5.33  )-----------( 260      ( 19 Apr 2021 06:00 )             30.5     04-19    137  |  99  |  46<H>  ----------------------------<  97  4.3   |  28  |  5.08<H>    Ca    8.8      19 Apr 2021 06:00      CAPILLARY BLOOD GLUCOSE    POCT Blood Glucose.: 153 mg/dL (19 Apr 2021 07:54)  POCT Blood Glucose.: 167 mg/dL (18 Apr 2021 16:52)      RADIOLOGY & ADDITIONAL TESTS: Reviewed.

## 2021-04-19 NOTE — CHART NOTE - NSCHARTNOTEFT_GEN_A_CORE
Nutrition Follow Up Note  Hospital Course   (Per Electronic Medical Record)    Source:  Patient [X]  Nursing Staff [X]  Medical Record [X]      Diet:   Consistent Carbohydrate Renal DASH-TLC Soft (Dysphagia 3-Soft & Bite Sized) Diet w/ Thin Liquids   Tolerates Diet Consistency Well - (Per Patient Observation)   Varied Intake @Meals (as Per Documentation)  - Fair Intake Today (Per Patient Nursing Aide)  on Nepro 8oz TID (Provides 1,275kcal & 57grams of Protein)   Patient Takes Nutrition Supplement Well (Per Patient Observation & Nursing Staff)      Enteral/Parenteral Nutrition: Not Applicable    Current Weight: 134.9lb on 4/18  Obtain Weights Daily  Weight Fluctuates  Obtain New Weight to Confirm      Pertinent Medications: MEDICATIONS  (STANDING):  clopidogrel Tablet 75 milliGRAM(s) Oral daily  dextrose 40% Gel 15 Gram(s) Oral once  dextrose 5%. 1000 milliLiter(s) (50 mL/Hr) IV Continuous <Continuous>  dextrose 5%. 1000 milliLiter(s) (100 mL/Hr) IV Continuous <Continuous>  dextrose 50% Injectable 25 Gram(s) IV Push once  dextrose 50% Injectable 12.5 Gram(s) IV Push once  dextrose 50% Injectable 25 Gram(s) IV Push once  diltiazem    milliGRAM(s) Oral daily  DULoxetine 60 milliGRAM(s) Oral daily  epoetin juan-epbx (RETACRIT) Injectable 63960 Unit(s) IV Push <User Schedule>  glucagon  Injectable 1 milliGRAM(s) IntraMuscular once  heparin   Injectable 5000 Unit(s) SubCutaneous every 8 hours  hydrALAZINE 25 milliGRAM(s) Oral every 12 hours  insulin lispro (ADMELOG) corrective regimen sliding scale   SubCutaneous two times a day with meals  isosorbide   mononitrate ER Tablet (IMDUR) 30 milliGRAM(s) Oral daily  melatonin 6 milliGRAM(s) Oral at bedtime  Nephro-pito 1 Tablet(s) Oral daily  pantoprazole    Tablet 40 milliGRAM(s) Oral two times a day  polyethylene glycol 3350 17 Gram(s) Oral daily  senna 2 Tablet(s) Oral at bedtime  tamsulosin 0.4 milliGRAM(s) Oral at bedtime    MEDICATIONS  (PRN):  acetaminophen   Tablet .. 650 milliGRAM(s) Oral every 6 hours PRN Temp greater or equal to 38C (100.4F), Mild Pain (1 - 3)  artificial  tears Solution 1 Drop(s) Both EYES two times a day PRN Dry Eyes    Pertinent Labs:  04-19 Na137 mmol/L Glu 97 mg/dL K+ 4.3 mmol/L Cr  5.08 mg/dL<H> BUN 46 mg/dL<H> 04-16 Phos 5.1 mg/dL<H> 04-16 Alb 2.3 g/dL<L>    POCT (over Last 3 Days) - Ranging from 100-194    Skin: Unstageable Pressure Ulcer on Coccyx & Left Heel  Multiple Surgical Incisions  (as Per Nursing Flow Sheet)     Edema: +2 Edema Noted to Left Lower Extremity (Potential for Weight Fluctuations)   (as Per Documentation)     Last Bowel Movement: on 4/18    Estimated Needs:   [X] No Change Since Previous Assessment    Previous Nutrition Diagnosis:   Moderate Malnutrition     Nutrition Diagnosis is [X] Ongoing - Continues on Nutrition Supplement & Patient Takes Nutrition Supplement     New Nutrition Diagnosis: [X] Not Applicable    Interventions:   1. Recommend Continue Nutrition Plan of Care     Monitoring & Evaluation:   [X] Weights   [X] PO Intake   [X] Skin Integrity   [X] Follow Up (Per Protocol)  [X] Tolerance to Diet Prescription   [X] Other: Labs & POCT    Registered Dietitian/Nutritionist Remains Available.  Juno Carbajal RDN    Pager # [phone]  Phone# [phone]

## 2021-04-19 NOTE — PROGRESS NOTE ADULT - ASSESSMENT
Pt is a 87 y/o M with PMHx of HTN, HLD, CAD, HFpEF, s/p Right CEA for Carotid artery disease, ESRD on HD 2d/week (M/Fri) via LUE fistula, s/p flecainide w/ ILR placed 6/2020, AS s/p TAVR, and PAD who presented to [facility] on 3/13/21 with GI bleed, as well as LLE pain, found to have occlusion of left superficial femoral artery. Pt underwent EGD and AVM cauterizations. He also underwent left fem-pop bypass x2 and was found to have LLE DVT, treated with heparin. He has functional deficits of with his ambulation and his endurance.      Comprehensive rehab program: -PT/OT/ST-total of 3 hrs/day 5 days/week     #PAD:  - Continue Plavix 75mg  - s/p fem-pop bypass and revision  - Completed Keflex 500mg bid for possible superficial infection    #DVT  - left femoral vein DVT  - on Heparin sq tid  - Per chart - patient not a candidate for IVC filter    #UGIB  - egd showed multiple AVMs, cauterized during EGD  - Protonix bid    #CKD  - on M/F dialysis with extra dialysis intermittently  - Renal consult  - Epoetin Yanick with dialysis    #CHF  - continue Cardizem 240mg daily  - Torsemide d/c by renal  - 4/9   - CXR with Pleural effusion  - Imdur 30mg daily    #HTN  - continue Hydralazine changed to 50mg tid     #DM  - ISS  - FS has been controlled - FS bid     #Pain  - Cymbalta 60mg daily  - Tylenol PRN, especially before therapy   - Ice to left knee    #BPH  - continue Flomax   - Patient with some spontaneous void     #GI  - Miralax, senna    #Skin  - Bilateral heel pressure ulcers DTI: off-load heels, left heel appears worse - wound care reconsulted  - Sacrum and buttock decubitus ulcers: were unstageable, starting to heal  - left calf dressing change BID and PRN - medial with suture and lateral with sutures  - staples present at groin and lateral thigh - will contact NSGY in regards to staples removal    #Diet  - Soft and bite sized  - Consistent Carb, DASH/TLC, Renal diet    Hospitalist and renal fu noted     #Dispo  Team meeting DATE: 4/13   Nursing: continent with 2 person assist  SW: lives with wife who provided supervision in the shower. There are 3 steps to enter the house, 0 inside. Daughters may be able to assist. Daughter trying to avoid LYNNE placement  OT: min A eating, mod A grooming, max A UBD, total A LBD, toileting, transfers. Barriers: endurance. Goals: mod A  PT: mod A sit to stand, 2 person assist pivot, no ambulation or stairs as of yet. Barriers: endurance, participation. Goals: min/mod A  ST: severe cog deficits, fluctuating orientation and memory, will need 24/7 supervision, cannot manage meds or money  EDOD: 4/29      Hospitalist and renal fu  noted.    Daughter updated over status    P2P with Dr. Adam on 4/13 for IRF coverage, Stay denied and appeal was requested for appeal. Family notified

## 2021-04-19 NOTE — PROGRESS NOTE ADULT - SUBJECTIVE AND OBJECTIVE BOX
S/p stable HD today    Vital Signs Last 24 Hrs  T(C): 36.3 (04-19-21 @ 17:45), Max: 36.6 (04-19-21 @ 08:38)  T(F): 97.4 (04-19-21 @ 17:45), Max: 97.8 (04-19-21 @ 08:38)  HR: 75 (04-19-21 @ 18:33) (63 - 77)  BP: 156/60 (04-19-21 @ 18:33) (139/57 - 156/60)  RR: 16 (04-19-21 @ 17:45) (14 - 16)  SpO2: 100% (04-19-21 @ 17:45) (98% - 100%)    s1s2  b/l air entry  soft  tr edema b/l LEBARBRA AVF patent                                                                                 9.6    5.33  )-----------( 260      ( 19 Apr 2021 06:00 )             30.5     19 Apr 2021 06:00    137    |  99     |  46     ----------------------------<  97     4.3     |  28     |  5.08     Ca    8.8        19 Apr 2021 06:00    acetaminophen   Tablet .. 650 milliGRAM(s) Oral every 6 hours PRN  artificial  tears Solution 1 Drop(s) Both EYES two times a day PRN  clopidogrel Tablet 75 milliGRAM(s) Oral daily  dextrose 40% Gel 15 Gram(s) Oral once  dextrose 5%. 1000 milliLiter(s) IV Continuous <Continuous>  dextrose 5%. 1000 milliLiter(s) IV Continuous <Continuous>  dextrose 50% Injectable 25 Gram(s) IV Push once  dextrose 50% Injectable 12.5 Gram(s) IV Push once  dextrose 50% Injectable 25 Gram(s) IV Push once  diltiazem    milliGRAM(s) Oral daily  DULoxetine 60 milliGRAM(s) Oral daily  epoetin juan-epbx (RETACRIT) Injectable 62803 Unit(s) IV Push <User Schedule>  glucagon  Injectable 1 milliGRAM(s) IntraMuscular once  heparin   Injectable 5000 Unit(s) SubCutaneous every 8 hours  hydrALAZINE 25 milliGRAM(s) Oral every 12 hours  insulin lispro (ADMELOG) corrective regimen sliding scale   SubCutaneous two times a day with meals  isosorbide   mononitrate ER Tablet (IMDUR) 30 milliGRAM(s) Oral daily  melatonin 6 milliGRAM(s) Oral at bedtime  Nephro-pito 1 Tablet(s) Oral daily  pantoprazole    Tablet 40 milliGRAM(s) Oral two times a day  polyethylene glycol 3350 17 Gram(s) Oral daily  senna 2 Tablet(s) Oral at bedtime  tamsulosin 0.4 milliGRAM(s) Oral at bedtime    A/P:    S/p complicated hospital course as per HPI  ESRD on HD  HD MWF  TMP 2kg w/HD today  Renal diet  Rehab    [phone]

## 2021-04-20 LAB
GLUCOSE BLDC GLUCOMTR-MCNC: 150 MG/DL — HIGH (ref 70–99)
GLUCOSE BLDC GLUCOMTR-MCNC: 225 MG/DL — HIGH (ref 70–99)
SARS-COV-2 RNA SPEC QL NAA+PROBE: SIGNIFICANT CHANGE UP

## 2021-04-20 PROCEDURE — 99232 SBSQ HOSP IP/OBS MODERATE 35: CPT

## 2021-04-20 RX ADMIN — HEPARIN SODIUM 5000 UNIT(S): 5000 INJECTION INTRAVENOUS; SUBCUTANEOUS at 21:11

## 2021-04-20 RX ADMIN — HEPARIN SODIUM 5000 UNIT(S): 5000 INJECTION INTRAVENOUS; SUBCUTANEOUS at 06:10

## 2021-04-20 RX ADMIN — Medication 25 MILLIGRAM(S): at 17:02

## 2021-04-20 RX ADMIN — ISOSORBIDE MONONITRATE 30 MILLIGRAM(S): 60 TABLET, EXTENDED RELEASE ORAL at 11:37

## 2021-04-20 RX ADMIN — Medication 4: at 16:31

## 2021-04-20 RX ADMIN — Medication 1 TABLET(S): at 11:37

## 2021-04-20 RX ADMIN — CLOPIDOGREL BISULFATE 75 MILLIGRAM(S): 75 TABLET, FILM COATED ORAL at 11:36

## 2021-04-20 RX ADMIN — Medication 240 MILLIGRAM(S): at 06:10

## 2021-04-20 RX ADMIN — TAMSULOSIN HYDROCHLORIDE 0.4 MILLIGRAM(S): 0.4 CAPSULE ORAL at 21:11

## 2021-04-20 RX ADMIN — PANTOPRAZOLE SODIUM 40 MILLIGRAM(S): 20 TABLET, DELAYED RELEASE ORAL at 06:10

## 2021-04-20 RX ADMIN — Medication 6 MILLIGRAM(S): at 21:12

## 2021-04-20 RX ADMIN — PANTOPRAZOLE SODIUM 40 MILLIGRAM(S): 20 TABLET, DELAYED RELEASE ORAL at 17:01

## 2021-04-20 RX ADMIN — HEPARIN SODIUM 5000 UNIT(S): 5000 INJECTION INTRAVENOUS; SUBCUTANEOUS at 13:13

## 2021-04-20 RX ADMIN — DULOXETINE HYDROCHLORIDE 60 MILLIGRAM(S): 30 CAPSULE, DELAYED RELEASE ORAL at 11:37

## 2021-04-20 RX ADMIN — Medication 25 MILLIGRAM(S): at 06:10

## 2021-04-20 RX ADMIN — SENNA PLUS 2 TABLET(S): 8.6 TABLET ORAL at 21:11

## 2021-04-20 NOTE — PROGRESS NOTE ADULT - SUBJECTIVE AND OBJECTIVE BOX
Patient is a 86y old  Male who presents with a chief complaint of PAD (20 Apr 2021 09:34)      Patient seen and examined at bedside.    ALLERGIES:  Plavix (Hives)  Toprol-XL (Rash)    MEDICATIONS  (STANDING):  clopidogrel Tablet 75 milliGRAM(s) Oral daily  dextrose 40% Gel 15 Gram(s) Oral once  dextrose 5%. 1000 milliLiter(s) (50 mL/Hr) IV Continuous <Continuous>  dextrose 5%. 1000 milliLiter(s) (100 mL/Hr) IV Continuous <Continuous>  dextrose 50% Injectable 25 Gram(s) IV Push once  dextrose 50% Injectable 12.5 Gram(s) IV Push once  dextrose 50% Injectable 25 Gram(s) IV Push once  diltiazem    milliGRAM(s) Oral daily  DULoxetine 60 milliGRAM(s) Oral daily  epoetin juan-epbx (RETACRIT) Injectable 50145 Unit(s) IV Push <User Schedule>  glucagon  Injectable 1 milliGRAM(s) IntraMuscular once  heparin   Injectable 5000 Unit(s) SubCutaneous every 8 hours  hydrALAZINE 25 milliGRAM(s) Oral every 12 hours  insulin lispro (ADMELOG) corrective regimen sliding scale   SubCutaneous two times a day with meals  isosorbide   mononitrate ER Tablet (IMDUR) 30 milliGRAM(s) Oral daily  melatonin 6 milliGRAM(s) Oral at bedtime  Nephro-pito 1 Tablet(s) Oral daily  pantoprazole    Tablet 40 milliGRAM(s) Oral two times a day  polyethylene glycol 3350 17 Gram(s) Oral daily  senna 2 Tablet(s) Oral at bedtime  tamsulosin 0.4 milliGRAM(s) Oral at bedtime    MEDICATIONS  (PRN):  acetaminophen   Tablet .. 650 milliGRAM(s) Oral every 6 hours PRN Temp greater or equal to 38C (100.4F), Mild Pain (1 - 3)  artificial  tears Solution 1 Drop(s) Both EYES two times a day PRN Dry Eyes    Vital Signs Last 24 Hrs  T(F): 98 (20 Apr 2021 08:38), Max: 98.1 (19 Apr 2021 20:06)  HR: 68 (20 Apr 2021 08:38) (61 - 77)  BP: 168/64 (20 Apr 2021 08:38) (140/67 - 168/64)  RR: 16 (20 Apr 2021 08:38) (16 - 16)  SpO2: 98% (20 Apr 2021 08:38) (97% - 100%)  I&O's Summary    19 Apr 2021 07:01  -  20 Apr 2021 07:00  --------------------------------------------------------  IN: 0 mL / OUT: 2000 mL / NET: -2000 mL        PHYSICAL EXAM:  General: NAD, A/O x 3  ENT: MMM  Neck: Supple, No JVD  Lungs: Clear to auscultation bilaterally  Cardio: RRR, S1/S2, No murmurs  Abdomen: Soft, Nontender, Nondistended; Bowel sounds present  Extremities: No calf tenderness, No pitting edema    LABS:                        9.6    5.33  )-----------( 260      ( 19 Apr 2021 06:00 )             30.5       04-19    137  |  99  |  46  ----------------------------<  97  4.3   |  28  |  5.08    Ca    8.8      19 Apr 2021 06:00       eGFR if Non African American: 10 mL/min/1.73M2 (04-19-21 @ 06:00)  eGFR if : 11 mL/min/1.73M2 (04-19-21 @ 06:00)                           POCT Blood Glucose.: 150 mg/dL (20 Apr 2021 08:00)  POCT Blood Glucose.: 99 mg/dL (19 Apr 2021 18:24)            RADIOLOGY & ADDITIONAL TESTS:    Care Discussed with Consultants/Other Providers:

## 2021-04-20 NOTE — PROGRESS NOTE ADULT - SUBJECTIVE AND OBJECTIVE BOX
No distress    Vital Signs Last 24 Hrs  T(C): 36.7 (04-20-21 @ 08:38), Max: 36.7 (04-19-21 @ 20:06)  T(F): 98 (04-20-21 @ 08:38), Max: 98.1 (04-19-21 @ 20:06)  HR: 70 (04-20-21 @ 17:00) (61 - 77)  BP: 135/62 (04-20-21 @ 17:00) (135/62 - 177/65)  RR: 16 (04-20-21 @ 17:00) (14 - 16)  SpO2: 100% (04-20-21 @ 11:33) (97% - 100%)    s1s2  b/l air entry  soft  tr edema b/l LEBARBRA AVF patent                                                                                         9.6    5.33  )-----------( 260      ( 19 Apr 2021 06:00 )             30.5     19 Apr 2021 06:00    137    |  99     |  46     ----------------------------<  97     4.3     |  28     |  5.08     Ca    8.8        19 Apr 2021 06:00    acetaminophen   Tablet .. 650 milliGRAM(s) Oral every 6 hours PRN  artificial  tears Solution 1 Drop(s) Both EYES two times a day PRN  clopidogrel Tablet 75 milliGRAM(s) Oral daily  dextrose 40% Gel 15 Gram(s) Oral once  dextrose 5%. 1000 milliLiter(s) IV Continuous <Continuous>  dextrose 5%. 1000 milliLiter(s) IV Continuous <Continuous>  dextrose 50% Injectable 25 Gram(s) IV Push once  dextrose 50% Injectable 12.5 Gram(s) IV Push once  dextrose 50% Injectable 25 Gram(s) IV Push once  diltiazem    milliGRAM(s) Oral daily  DULoxetine 60 milliGRAM(s) Oral daily  epoetin juan-epbx (RETACRIT) Injectable 43898 Unit(s) IV Push <User Schedule>  glucagon  Injectable 1 milliGRAM(s) IntraMuscular once  heparin   Injectable 5000 Unit(s) SubCutaneous every 8 hours  hydrALAZINE 25 milliGRAM(s) Oral every 12 hours  insulin lispro (ADMELOG) corrective regimen sliding scale   SubCutaneous two times a day with meals  isosorbide   mononitrate ER Tablet (IMDUR) 30 milliGRAM(s) Oral daily  melatonin 6 milliGRAM(s) Oral at bedtime  Nephro-pito 1 Tablet(s) Oral daily  pantoprazole    Tablet 40 milliGRAM(s) Oral two times a day  polyethylene glycol 3350 17 Gram(s) Oral daily  senna 2 Tablet(s) Oral at bedtime  tamsulosin 0.4 milliGRAM(s) Oral at bedtime    A/P:    S/p complicated hospital course as per HPI  ESRD on HD  HD MWF  TMP 2kg w/HD   Epogen  Renal diet  Rehab    [phone]

## 2021-04-20 NOTE — CONSULT NOTE ADULT - SUBJECTIVE AND OBJECTIVE BOX
History of Present Illness:  86y.o  Male with a history of diabetes, CAD, ESRD, and PAD is s/p LLE bypass. He presents with dry gangrene of left 4th and 5th toes coupled with edema of the left calf and ankle. I was requested to evaluate his LE circulation. He denies claudication or ischemic pedal pain at rest.    PAST MEDICAL & SURGICAL HISTORY:  DM (diabetes mellitus)  - resolved    AV block, 1st degree    Arrhythmia    CAD (coronary artery disease)    HTN (hypertension)    VT (ventricular tachycardia)    RICHARDS (dyspnea on exertion)    Anemia    Risk factors for obstructive sleep apnea    ESRD on dialysis  HD on Mondays and Fridays    Aortic stenosis  - s/p valve replacement    GI bleeding  due to ulcerated polyps and colonic AVMs    H/O circumcision  at  age  65    H/O left knee surgery    H/O angioplasty  2013,  no  intervention    A-V fistula  left arm 5/2017    H/O carotid endarterectomy  Right    S/P TAVR (transcatheter aortic valve replacement)    History of loop recorder        Allergies    Plavix (Hives)  Toprol-XL (Rash)    Intolerances        MEDICATIONS  (STANDING):  clopidogrel Tablet 75 milliGRAM(s) Oral daily  dextrose 40% Gel 15 Gram(s) Oral once  dextrose 5%. 1000 milliLiter(s) (50 mL/Hr) IV Continuous <Continuous>  dextrose 5%. 1000 milliLiter(s) (100 mL/Hr) IV Continuous <Continuous>  dextrose 50% Injectable 25 Gram(s) IV Push once  dextrose 50% Injectable 12.5 Gram(s) IV Push once  dextrose 50% Injectable 25 Gram(s) IV Push once  diltiazem    milliGRAM(s) Oral daily  DULoxetine 60 milliGRAM(s) Oral daily  epoetin juan-epbx (RETACRIT) Injectable 79808 Unit(s) IV Push <User Schedule>  glucagon  Injectable 1 milliGRAM(s) IntraMuscular once  heparin   Injectable 5000 Unit(s) SubCutaneous every 8 hours  hydrALAZINE 25 milliGRAM(s) Oral every 12 hours  insulin lispro (ADMELOG) corrective regimen sliding scale   SubCutaneous two times a day with meals  isosorbide   mononitrate ER Tablet (IMDUR) 30 milliGRAM(s) Oral daily  melatonin 6 milliGRAM(s) Oral at bedtime  Nephro-pito 1 Tablet(s) Oral daily  pantoprazole    Tablet 40 milliGRAM(s) Oral two times a day  polyethylene glycol 3350 17 Gram(s) Oral daily  senna 2 Tablet(s) Oral at bedtime  tamsulosin 0.4 milliGRAM(s) Oral at bedtime    MEDICATIONS  (PRN):  acetaminophen   Tablet .. 650 milliGRAM(s) Oral every 6 hours PRN Temp greater or equal to 38C (100.4F), Mild Pain (1 - 3)  artificial  tears Solution 1 Drop(s) Both EYES two times a day PRN Dry Eyes      Social History:  Smoking History:past  Alcohol Use:  social    REVIEW OF SYSTEMS:  CONSTITUTIONAL: No weakness, fevers or chills  EYES/ENT: No visual changes;  No vertigo or throat pain   NECK: No pain or stiffness  RESPIRATORY: No cough, wheezing, hemoptysis; No shortness of breath  CARDIOVASCULAR: No chest pain or palpitations  GASTROINTESTINAL: No abdominal or epigastric pain. No nausea, vomiting, or hematemesis; No diarrhea or constipation. No melena or hematochezia.  GENITOURINARY: No dysuria, frequency or hematuria  NEUROLOGICAL: No numbness or weakness  SKIN: No itching, burning, rashes, or lesions   Vascular:  No lower extremity claudication, pedal rest pain.  ++ edema of left lower leg. ++ dry gangrene left 4-5th toes.  All other review of systems is negative unless indicated above.    PHYSICAL EXAM:  General:  On exam, the patient is alert nontoxic appearing Male in no acute distress.  Vital Signs Last 24 Hrs  T(C): 36.7 (19 Apr 2021 20:06), Max: 36.7 (19 Apr 2021 20:06)  T(F): 98.1 (19 Apr 2021 20:06), Max: 98.1 (19 Apr 2021 20:06)  HR: 63 (20 Apr 2021 06:08) (61 - 77)  BP: 162/73 (20 Apr 2021 06:08) (140/67 - 162/73)  BP(mean): --  RR: 16 (19 Apr 2021 20:06) (15 - 16)  SpO2: 97% (19 Apr 2021 20:06) (97% - 100%)    Neck:  4+/4+ bilateral carotid pulses; no carotid bruit, no palpable cervical masses.  Heart:  Regular, no murmurs, rubs or gallops.    Lungs:  Clear to auscultation.    Chest:  No chest wall deformities.  Symmetrical chest expansion.   Abdomen: Soft and nontender.  No palpable masses.  No rebound, guarding or rigidity.  Extremities:  There is 2+ edema of the left foot , ankle, and calf.. There is dry gangrene of the tips of left 4th and 5th toes. Both  feet are warm, pink with normal capillary refill times.  There are no heel decubiti.  The calf and thigh muscles are soft and nontender.  There are no palpable cords or limb cellulitis.  Dheeraj's sign  is negative bilaterally.  There is no lower extremity  cyanosis, or rubor.  Left leg wounds: are clean, dry, and intact.  On examination of the peripheral pulses:  Left leg femoral pulse is 4/4   , popliteal pulse is 4/4   ,PT Pulse is  0 , DP Pulse is 3/4  Right leg femoral pulse is  4/4  ,popliteal pulse is 2/4   , PT Pulse is  0 , DP Pulse is 2/4   Neurological:  There are no motor or sensory deficits in either lower extremity.                          9.6    5.33  )-----------( 260      ( 19 Apr 2021 06:00 )             30.5     04-19    137  |  99  |  46<H>  ----------------------------<  97  4.3   |  28  |  5.08<H>    Ca    8.8      19 Apr 2021 06:00              Radiology:

## 2021-04-20 NOTE — PROGRESS NOTE ADULT - ASSESSMENT
Pt is a 85 y/o M with PMHx of HTN, HLD, CAD, HFpEF, s/p Right CEA for Carotid artery disease, ESRD on HD 2d/week (M/Fri) via LUE fistula, s/p flecainide w/ ILR placed 6/2020, AS s/p TAVR, and PAD who presented to [facility] on 3/13/21 with GI bleed, as well as LLE pain, found to have occlusion of left superficial femoral artery. Pt underwent EGD and AVM cauterizations. He also underwent left fem-pop bypass x2 and was found to have LLE DVT, treated with heparin. He has functional deficits of with his ambulation and his endurance.      Comprehensive rehab program: -PT/OT/ST-total of 3 hrs/day 5 days/week     #PAD:- Continue Plavix 75mg  - s/p fem-pop bypass and revision  - Completed Keflex 500mg bid for possible superficial infection    #DVT  - left femoral vein DVT  - on Heparin sq tid  - Per chart - patient not a candidate for IVC filter    #UGIB  - egd showed multiple AVMs, cauterized during EGD  - Protonix bid    #CKD  - on M/F dialysis with extra dialysis intermittently  - Renal consult  - Epoetin Yanick with dialysis    #CHF  - continue Cardizem 240mg daily  - Torsemide d/c by renal  - 4/9   - CXR with Pleural effusion  - Imdur 30mg daily    #HTN:- continue Hydralazine changed to 50mg tid     #DM:- ISS  - FS has been controlled - FS bid     #Pain  - Cymbalta 60mg daily  - Tylenol PRN, modalities to left knee    #BPH  - continue Flomax   - Patient with some spontaneous void     #GI  - Miralax, senna    #Skin  - Bilateral heel pressure ulcers DTI: off-load heels, Elevate leg while in bed   - Sacrum and buttock decubitus ulcers: were unstageable, starting to heal  - left calf dressing change BID and PRN - medial with staples and lateral with sutures  - staples present at groin and lateral thigh   Left message for surgeon  Vascular surgery consult noted     #Diet  - Soft and bite sized  - Consistent Carb, DASH/TLC, Renal diet    Hospitalist and renal fu noted     #Dispo  Team meeting DATE: 4/13   EDOD: 4/29      Hospitalist and renal fu  noted.

## 2021-04-20 NOTE — CONSULT NOTE ADULT - ASSESSMENT
86 y.o. male with PAD has a patent LLE bypass. He has post-op edema that may last 3 months. I recommend elevating the left leg when in bed and allow the dry gangrene to autoamputate. There is no limb threatening ischemia.

## 2021-04-20 NOTE — CONSULT NOTE ADULT - NSTELEHEALTH_GEN_ALL_CORE
Cataract(s)-Visually significant cataract Ou .-Cataract(s) causing symptomatic impairment of visual function not correctable with a tolerable change in glasses or contact lenses lighting or non-operative means resulting in specific activity limitations and/or participation restrictions including but not limited to reading viewing television driving or meeting vocational or recreational needs. -Expectation is clearer vision and functional improvement in symptoms as well as reduced glare disability after cataract removal.-Order IOLMaster and OPD today. -Recommend Toric/ Lensx OD LRI/Lensx OS   based on today's OPD testing and lifestyle questionnaire.-All questions were answered regarding surgery including pre and post-op medications appointments activity restrictions and anesthetic usage.-The risks benefits and alternatives and special risk factors for the patient were discussed in detail including but not limited to: bleeding infection retinal detachment vitreous loss problems with the implant and possible need for additional surgery.-Although rare the possibility of complete vision loss was discussed.-The possible need for glasses post-operatively was discussed.-Order medical clearance exam based on history of DM -Patient elects to proceed with cataract surgery OD .  Will schedule at patient's convenience and re-evaluate OS  in the future.; 's Notes: MR 7/13/18DFE 08/19/19
No
No

## 2021-04-20 NOTE — PROGRESS NOTE ADULT - SUBJECTIVE AND OBJECTIVE BOX
HPI:  Pt is a 85 y/o M with PMHx of HTN, HLD, CAD, HFpEF, s/p Right CEA for Carotid artery disease, ESRD on HD 2d/week (M/Fri) via LUE fistula, s/p flecainide w/ ILR placed 6/2020, AS s/p TAVR, and PAD (RLE fem-pop bypass October 2020) who presented to [facility] on 3/13/21 with GI bleed, as well as LLE pain. 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 When patient was stable from GI, medical, cardiac standpoint, he went for LLE angiogram on 3/18, and found occlusion of L Superficial femoral artery. He was planned for a LLE fem-pop bypass, and had the bypass performed on 3/20. On 3/22, patient was a RRT for AMS, fever. Work-up revealed pleural effusions and L femoral DVT. He was started on abx for presumed pna and hep gtt for DVT. Patient began to show signs of malperfusion with coolness to touch, mottling of skin to LLE. He was planned for revision of LLE bypass. Went to OR on 3/25 for LLE bypass revision with explant of PTFE bypass, CFA to AT bypass with cryovein performed. Intra-op patient with 1500 ml of blood loss requiring 8 unit PRBC, 4 unit FFP, 1 donor unit plt.  During procedure, pt lost function of PPM with period of asystole requiring transcutaneous pacing. Cardiology consulted and pacemaker interrogated in the OR.  Pt taken to PACU post op and SICU consulted. Patient went to SICU post-op. On 3/27 patient was extubated and was downgraded from SICU to floor the next day. Rest of hospital course was uneventful. He worked with PT, was evaluated by PM&R and approved for acute rehab. The patient remained on heparin sq while in the hospital for tx of dvt (his plavix was held). Upon discharge, approved by CHALINO and Dr. Albrecht to resume plavix. He will remain on heparin sq while at rehab for dvt tx and ppx. He is not a candidate for IVC filter. Upon discharge, pain is well controlled, tolerating diet, remains HD stable, no clinical signs/symptoms of GI bleed, LLE remains warm and well perfused, he has a strong LLE palpable graft pulse and dopplerable signals. Per attending, he is medically stable for discharge to AR and on 4/8/21 he was transferred to [facility] for acute inpatient rehabilitation.    SUBJECTIVE / INTERVAL HPI: Patient seen and examined at bedside with therapy.  No acute event overnight.    In good spirits this am  Feels well and denies any pain   Vascular and Podiatry evals noted        REVIEW OF SYSTEMS:  CONSTITUTIONAL: No fever  EYES/ENT:Denies eye pain   NECK: No pain or stiffness  RESPIRATORY: No cough,   CARDIOVASCULAR: No chest pain or palpitations  GASTROINTESTINAL: No abdominal or epigastric pain.  GENITOURINARY: No dysuria, frequency or hematuria  NEUROLOGICAL: No numbness, + weakness  SKIN: No itching,  +left groin and leg incisions    Vital Signs Last 24 Hrs  T(C): 36.7 (20 Apr 2021 08:38), Max: 36.7 (19 Apr 2021 20:06)  T(F): 98 (20 Apr 2021 08:38), Max: 98.1 (19 Apr 2021 20:06)  HR: 68 (20 Apr 2021 08:38) (61 - 77)  BP: 168/64 (20 Apr 2021 08:38) (140/67 - 168/64)  BP(mean): --  RR: 16 (20 Apr 2021 08:38) (16 - 16)  SpO2: 98% (20 Apr 2021 08:38) (97% - 100%)      PHYSICAL EXAM:  General: NAD, sitting in wheelchair comfortably  Cardiovascular: +S1/S2  Respiratory: CTA B/L, but breath sounds are decreased at bases  Gastrointestinal: soft, NT/ND  Extremities: LUE AV fistula intact, bilateral lower extremities with edema, Left foot now appears more swollen . DP pulse well felt   Neurological: AAOx2, Motor exam grossly intact, no sensory deficits  Skin: Decubitus bilateral heel pressure ulcers DTI,    Left groin and lateral thigh with staples. Left calf dressing with serosanguinous drainage, changed - incisions: lateral (suture) and medial (staples).  Gangrenous toes on left foot.   Decubitus sacral and buttock ulcers present- unstageable- improving - now  stage 2 and healing well       Function:  OT: 2 person assist for toileting   1 person assist for transfers          MEDICATIONS  (STANDING):  clopidogrel Tablet 75 milliGRAM(s) Oral daily  dextrose 40% Gel 15 Gram(s) Oral once  dextrose 5%. 1000 milliLiter(s) (50 mL/Hr) IV Continuous <Continuous>  dextrose 5%. 1000 milliLiter(s) (100 mL/Hr) IV Continuous <Continuous>  dextrose 50% Injectable 25 Gram(s) IV Push once  dextrose 50% Injectable 12.5 Gram(s) IV Push once  dextrose 50% Injectable 25 Gram(s) IV Push once  diltiazem    milliGRAM(s) Oral daily  DULoxetine 60 milliGRAM(s) Oral daily  epoetin juan-epbx (RETACRIT) Injectable 28336 Unit(s) IV Push <User Schedule>  glucagon  Injectable 1 milliGRAM(s) IntraMuscular once  heparin   Injectable 5000 Unit(s) SubCutaneous every 8 hours  hydrALAZINE 25 milliGRAM(s) Oral every 12 hours  insulin lispro (ADMELOG) corrective regimen sliding scale   SubCutaneous two times a day with meals  isosorbide   mononitrate ER Tablet (IMDUR) 30 milliGRAM(s) Oral daily  melatonin 6 milliGRAM(s) Oral at bedtime  Nephro-pito 1 Tablet(s) Oral daily  pantoprazole    Tablet 40 milliGRAM(s) Oral two times a day  polyethylene glycol 3350 17 Gram(s) Oral daily  senna 2 Tablet(s) Oral at bedtime  tamsulosin 0.4 milliGRAM(s) Oral at bedtime    MEDICATIONS  (PRN):  acetaminophen   Tablet .. 650 milliGRAM(s) Oral every 6 hours PRN Temp greater or equal to 38C (100.4F), Mild Pain (1 - 3)  artificial  tears Solution 1 Drop(s) Both EYES two times a day PRN Dry Eyes          POCT Blood Glucose.: 150 mg/dL (20 Apr 2021 08:00)  POCT Blood Glucose.: 99 mg/dL (19 Apr 2021 18:24)

## 2021-04-21 LAB
ANION GAP SERPL CALC-SCNC: 10 MMOL/L — SIGNIFICANT CHANGE UP (ref 5–17)
BUN SERPL-MCNC: 40 MG/DL — HIGH (ref 7–23)
CALCIUM SERPL-MCNC: 9.4 MG/DL — SIGNIFICANT CHANGE UP (ref 8.4–10.5)
CHLORIDE SERPL-SCNC: 99 MMOL/L — SIGNIFICANT CHANGE UP (ref 96–108)
CO2 SERPL-SCNC: 25 MMOL/L — SIGNIFICANT CHANGE UP (ref 22–31)
CREAT SERPL-MCNC: 4.87 MG/DL — HIGH (ref 0.5–1.3)
GLUCOSE BLDC GLUCOMTR-MCNC: 102 MG/DL — HIGH (ref 70–99)
GLUCOSE BLDC GLUCOMTR-MCNC: 108 MG/DL — HIGH (ref 70–99)
GLUCOSE SERPL-MCNC: 133 MG/DL — HIGH (ref 70–99)
HCT VFR BLD CALC: 33.3 % — LOW (ref 39–50)
HGB BLD-MCNC: 10.8 G/DL — LOW (ref 13–17)
MCHC RBC-ENTMCNC: 31.3 PG — SIGNIFICANT CHANGE UP (ref 27–34)
MCHC RBC-ENTMCNC: 32.4 GM/DL — SIGNIFICANT CHANGE UP (ref 32–36)
MCV RBC AUTO: 96.5 FL — SIGNIFICANT CHANGE UP (ref 80–100)
NRBC # BLD: 0 /100 WBCS — SIGNIFICANT CHANGE UP (ref 0–0)
PHOSPHATE SERPL-MCNC: 4.7 MG/DL — HIGH (ref 2.5–4.5)
PLATELET # BLD AUTO: 274 K/UL — SIGNIFICANT CHANGE UP (ref 150–400)
POTASSIUM SERPL-MCNC: 4.6 MMOL/L — SIGNIFICANT CHANGE UP (ref 3.5–5.3)
POTASSIUM SERPL-SCNC: 4.6 MMOL/L — SIGNIFICANT CHANGE UP (ref 3.5–5.3)
RBC # BLD: 3.45 M/UL — LOW (ref 4.2–5.8)
RBC # FLD: 18.3 % — HIGH (ref 10.3–14.5)
SODIUM SERPL-SCNC: 134 MMOL/L — LOW (ref 135–145)
WBC # BLD: 6.62 K/UL — SIGNIFICANT CHANGE UP (ref 3.8–10.5)
WBC # FLD AUTO: 6.62 K/UL — SIGNIFICANT CHANGE UP (ref 3.8–10.5)

## 2021-04-21 PROCEDURE — 99232 SBSQ HOSP IP/OBS MODERATE 35: CPT

## 2021-04-21 RX ADMIN — DULOXETINE HYDROCHLORIDE 60 MILLIGRAM(S): 30 CAPSULE, DELAYED RELEASE ORAL at 12:19

## 2021-04-21 RX ADMIN — Medication 1 TABLET(S): at 12:18

## 2021-04-21 RX ADMIN — Medication 240 MILLIGRAM(S): at 06:46

## 2021-04-21 RX ADMIN — ISOSORBIDE MONONITRATE 30 MILLIGRAM(S): 60 TABLET, EXTENDED RELEASE ORAL at 12:18

## 2021-04-21 RX ADMIN — SENNA PLUS 2 TABLET(S): 8.6 TABLET ORAL at 21:25

## 2021-04-21 RX ADMIN — Medication 650 MILLIGRAM(S): at 10:58

## 2021-04-21 RX ADMIN — CLOPIDOGREL BISULFATE 75 MILLIGRAM(S): 75 TABLET, FILM COATED ORAL at 12:19

## 2021-04-21 RX ADMIN — Medication 25 MILLIGRAM(S): at 17:15

## 2021-04-21 RX ADMIN — Medication 25 MILLIGRAM(S): at 06:45

## 2021-04-21 RX ADMIN — PANTOPRAZOLE SODIUM 40 MILLIGRAM(S): 20 TABLET, DELAYED RELEASE ORAL at 17:15

## 2021-04-21 RX ADMIN — Medication 650 MILLIGRAM(S): at 11:30

## 2021-04-21 RX ADMIN — PANTOPRAZOLE SODIUM 40 MILLIGRAM(S): 20 TABLET, DELAYED RELEASE ORAL at 06:45

## 2021-04-21 RX ADMIN — HEPARIN SODIUM 5000 UNIT(S): 5000 INJECTION INTRAVENOUS; SUBCUTANEOUS at 21:25

## 2021-04-21 RX ADMIN — HEPARIN SODIUM 5000 UNIT(S): 5000 INJECTION INTRAVENOUS; SUBCUTANEOUS at 06:46

## 2021-04-21 RX ADMIN — HEPARIN SODIUM 5000 UNIT(S): 5000 INJECTION INTRAVENOUS; SUBCUTANEOUS at 13:57

## 2021-04-21 RX ADMIN — TAMSULOSIN HYDROCHLORIDE 0.4 MILLIGRAM(S): 0.4 CAPSULE ORAL at 21:25

## 2021-04-21 RX ADMIN — Medication 6 MILLIGRAM(S): at 21:25

## 2021-04-21 NOTE — PROGRESS NOTE ADULT - SUBJECTIVE AND OBJECTIVE BOX
Patient is a 86y old  Male who presents with a chief complaint of Impairment group -13 - PAD (21 Apr 2021 11:40)      Patient seen and examined at bedside.  Denies chest pain, dyspnea, abd pain.    ALLERGIES:  Plavix (Hives)  Toprol-XL (Rash)    MEDICATIONS  (STANDING):  clopidogrel Tablet 75 milliGRAM(s) Oral daily  dextrose 40% Gel 15 Gram(s) Oral once  dextrose 5%. 1000 milliLiter(s) (50 mL/Hr) IV Continuous <Continuous>  dextrose 5%. 1000 milliLiter(s) (100 mL/Hr) IV Continuous <Continuous>  dextrose 50% Injectable 25 Gram(s) IV Push once  dextrose 50% Injectable 12.5 Gram(s) IV Push once  dextrose 50% Injectable 25 Gram(s) IV Push once  diltiazem    milliGRAM(s) Oral daily  DULoxetine 60 milliGRAM(s) Oral daily  epoetin juan-epbx (RETACRIT) Injectable 73400 Unit(s) IV Push <User Schedule>  glucagon  Injectable 1 milliGRAM(s) IntraMuscular once  heparin   Injectable 5000 Unit(s) SubCutaneous every 8 hours  hydrALAZINE 25 milliGRAM(s) Oral every 12 hours  insulin lispro (ADMELOG) corrective regimen sliding scale   SubCutaneous two times a day with meals  isosorbide   mononitrate ER Tablet (IMDUR) 30 milliGRAM(s) Oral daily  melatonin 6 milliGRAM(s) Oral at bedtime  Nephro-pito 1 Tablet(s) Oral daily  pantoprazole    Tablet 40 milliGRAM(s) Oral two times a day  polyethylene glycol 3350 17 Gram(s) Oral daily  senna 2 Tablet(s) Oral at bedtime  tamsulosin 0.4 milliGRAM(s) Oral at bedtime    MEDICATIONS  (PRN):  acetaminophen   Tablet .. 650 milliGRAM(s) Oral every 6 hours PRN Temp greater or equal to 38C (100.4F), Mild Pain (1 - 3)  artificial  tears Solution 1 Drop(s) Both EYES two times a day PRN Dry Eyes    Vital Signs Last 24 Hrs  T(F): 98 (21 Apr 2021 07:04), Max: 98 (21 Apr 2021 07:04)  HR: 66 (21 Apr 2021 12:17) (60 - 70)  BP: 138/50 (21 Apr 2021 12:17) (135/62 - 168/70)  RR: 16 (21 Apr 2021 07:04) (16 - 16)  SpO2: 97% (21 Apr 2021 07:04) (97% - 97%)  I&O's Summary      PHYSICAL EXAM:  General: NAD, A/O x 3  ENT: MMM  Neck: Supple, No JVD  Lungs: Clear to auscultation bilaterally  Cardio: RRR, S1/S2, No murmurs  Abdomen: Soft, Nontender, Nondistended; Bowel sounds present  Extremities: No calf tenderness, No pitting edema    LABS:                        9.6    5.33  )-----------( 260      ( 19 Apr 2021 06:00 )             30.5       04-19    137  |  99  |  46  ----------------------------<  97  4.3   |  28  |  5.08    Ca    8.8      19 Apr 2021 06:00       eGFR if Non African American: 10 mL/min/1.73M2 (04-19-21 @ 06:00)  eGFR if : 11 mL/min/1.73M2 (04-19-21 @ 06:00)                           POCT Blood Glucose.: 108 mg/dL (21 Apr 2021 07:33)  POCT Blood Glucose.: 225 mg/dL (20 Apr 2021 16:27)            RADIOLOGY & ADDITIONAL TESTS:    Care Discussed with Consultants/Other Providers:

## 2021-04-21 NOTE — PROGRESS NOTE ADULT - ASSESSMENT
Pt is a 85 y/o M with PMHx of HTN, HLD, CAD, HFpEF, s/p Right CEA for Carotid artery disease, ESRD on HD 2d/week (M/Fri) via LUE fistula, s/p flecainide w/ ILR placed 6/2020, AS s/p TAVR, and PAD who presented to [facility] on 3/13/21 with GI bleed, as well as LLE pain, found to have occlusion of left superficial femoral artery. Pt underwent EGD and AVM cauterizations. He also underwent left fem-pop bypass x2 and was found to have LLE DVT, treated with heparin. He has functional deficits of with his ambulation and his endurance.      Comprehensive rehab program: -PT/OT/ST-total of 3 hrs/day 5 days/week     #PAD:- Continue Plavix 75mg  - s/p fem-pop bypass and revision  - Completed Keflex 500mg bid for possible superficial infection at admission    #DVT  - left femoral vein DVT  - on Heparin sq tid  - ?patient not a candidate for IVC filter    #UGIB  - egd showed multiple AVMs, cauterized during EGD  - Protonix bid    #CKD  - on M/F dialysis with extra dialysis intermittently  - Renal consult  - Epoetin Yanick with dialysis    #CHF  - continue Cardizem 240mg daily  - Torsemide d/c by renal  - 4/9   - CXR with Pleural effusion  - Imdur 30mg daily    #HTN:- continue Hydralazine 50mg tid     #DM:- ISS  - FS has been controlled - FS bid     #Pain  - Cymbalta 60mg daily  - Tylenol PRN, modalities to left knee    #BPH  - continue Flomax   - Patient with some spontaneous void     #GI  - Miralax, senna    #Skin  - Bilateral heel pressure ulcers DTI: off-load heels, Elevate leg while in bed   - Sacrum and buttock decubitus ulcers: were unstageable, starting to heal  - left calf dressing change BID and PRN - medial with staples and lateral with sutures  - staples present at groin and lateral thigh   vascular surgery consult noted     #Diet  - Soft and bite sized  - Consistent Carb, DASH/TLC, Renal diet    Hospitalist and renal fu noted     #Dispo  Team meeting DATE: 4/13   Patient progressing well in therapy   EDOD: 4/29      Hospitalist and renal fu  noted.

## 2021-04-21 NOTE — PROGRESS NOTE ADULT - SUBJECTIVE AND OBJECTIVE BOX
Only agreed to 2hrs HD today    Vital Signs Last 24 Hrs  T(C): 36.7 (04-21-21 @ 19:31), Max: 36.7 (04-21-21 @ 07:04)  T(F): 98.1 (04-21-21 @ 19:31), Max: 98.1 (04-21-21 @ 19:31)  HR: 66 (04-21-21 @ 19:31) (58 - 66)  BP: 138/50 (04-21-21 @ 19:31) (107/67 - 168/70)  RR: 16 (04-21-21 @ 19:31) (16 - 16)  SpO2: 98% (04-21-21 @ 19:31) (97% - 100%)    s1s2  b/l air entry  soft  tr edema b/l BARBRA KIM AVF patent                                                                                                10.8   6.62  )-----------( 274      ( 21 Apr 2021 14:30 )             33.3     21 Apr 2021 14:30    134    |  99     |  40     ----------------------------<  133    4.6     |  25     |  4.87     Ca    9.4        21 Apr 2021 14:30  Phos  4.7       21 Apr 2021 14:30    acetaminophen   Tablet .. 650 milliGRAM(s) Oral every 6 hours PRN  artificial  tears Solution 1 Drop(s) Both EYES two times a day PRN  clopidogrel Tablet 75 milliGRAM(s) Oral daily  dextrose 40% Gel 15 Gram(s) Oral once  dextrose 5%. 1000 milliLiter(s) IV Continuous <Continuous>  dextrose 5%. 1000 milliLiter(s) IV Continuous <Continuous>  dextrose 50% Injectable 25 Gram(s) IV Push once  dextrose 50% Injectable 12.5 Gram(s) IV Push once  dextrose 50% Injectable 25 Gram(s) IV Push once  diltiazem    milliGRAM(s) Oral daily  DULoxetine 60 milliGRAM(s) Oral daily  epoetin juan-epbx (RETACRIT) Injectable 24420 Unit(s) IV Push <User Schedule>  glucagon  Injectable 1 milliGRAM(s) IntraMuscular once  heparin   Injectable 5000 Unit(s) SubCutaneous every 8 hours  hydrALAZINE 25 milliGRAM(s) Oral every 12 hours  insulin lispro (ADMELOG) corrective regimen sliding scale   SubCutaneous two times a day with meals  isosorbide   mononitrate ER Tablet (IMDUR) 30 milliGRAM(s) Oral daily  melatonin 6 milliGRAM(s) Oral at bedtime  Nephro-pito 1 Tablet(s) Oral daily  pantoprazole    Tablet 40 milliGRAM(s) Oral two times a day  polyethylene glycol 3350 17 Gram(s) Oral daily  senna 2 Tablet(s) Oral at bedtime  tamsulosin 0.4 milliGRAM(s) Oral at bedtime    A/P:    S/p complicated hospital course as per HPI  ESRD on HD  HD MWF  TMP 2kg w/HD as able  Epogen  Renal diet  Rehab    [phone]

## 2021-04-21 NOTE — PROGRESS NOTE ADULT - SUBJECTIVE AND OBJECTIVE BOX
HPI:  Pt is a 85 y/o M with PMHx of HTN, HLD, CAD, HFpEF, s/p Right CEA for Carotid artery disease, ESRD on HD 2d/week (/Fri) via LUE fistula, s/p flecainide w/ ILR placed 2020, AS s/p TAVR, and PAD (RLE fem-pop bypass 2020) who presented to [facility] on 3/13/21 with GI bleed, as well as LLE pain. 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 When patient was stable from GI, medical, cardiac standpoint, he went for LLE angiogram on 3/18, and found occlusion of L Superficial femoral artery. He was planned for a LLE fem-pop bypass, and had the bypass performed on 3/20. On 3/22, patient was a RRT for AMS, fever. Work-up revealed pleural effusions and L femoral DVT. He was started on abx for presumed pna and hep gtt for DVT. Patient began to show signs of malperfusion with coolness to touch, mottling of skin to LLE. He was planned for revision of LLE bypass. Went to OR on 3/25 for LLE bypass revision with explant of PTFE bypass, CFA to AT bypass with cryovein performed. Intra-op patient with 1500 ml of blood loss requiring 8 unit PRBC, 4 unit FFP, 1 donor unit plt.  During procedure, pt lost function of PPM with period of asystole requiring transcutaneous pacing. Cardiology consulted and pacemaker interrogated in the OR.  Pt taken to PACU post op and SICU consulted. Patient went to SICU post-op. On 3/27 patient was extubated and was downgraded from SICU to floor the next day. Rest of hospital course was uneventful. He worked with PT, was evaluated by PM&R and approved for acute rehab. The patient remained on heparin sq while in the hospital for tx of dvt (his plavix was held). Upon discharge, approved by CHALINO and Dr. Albrecht to resume plavix. He will remain on heparin sq while at rehab for dvt tx and ppx. He is not a candidate for IVC filter. Upon discharge, pain is well controlled, tolerating diet, remains HD stable, no clinical signs/symptoms of GI bleed, LLE remains warm and well perfused, he has a strong LLE palpable graft pulse and dopplerable signals. Per attending, he is medically stable for discharge to [facility] and on 21 he was transferred to [facility] for acute inpatient rehabilitation.    SUBJECTIVE / INTERVAL HPI: Patient seen and examined at bedside with therapy.  No acute event overnight.    Reports good sleep, but continues to feel tired. More alert this am   Pain in left LE at times         REVIEW OF SYSTEMS:  CONSTITUTIONAL: No fever  EYES/ENT:Denies eye pain   NECK: No pain or stiffness  RESPIRATORY: No cough,   CARDIOVASCULAR: No chest pain or palpitations  GASTROINTESTINAL: No abdominal or epigastric pain.  GENITOURINARY: No dysuria, frequency or hematuria  NEUROLOGICAL: No numbness, + weakness  SKIN: No itching,  +left groin and leg incisions    Vital Signs Last 24 Hrs  Vital Signs Last 24 Hrs  T(C): 36.7 (2021 07:04), Max: 36.7 (2021 07:04)  T(F): 98 (2021 07:04), Max: 98 (2021 07:04)  HR: 66 (2021 07:04) (60 - 70)  BP: 149/50 (2021 07:04) (135/62 - 168/70)  BP(mean): --  RR: 16 (2021 07:04) (16 - 16)  SpO2: 97% (2021 07:04) (97% - 97%)  Daily     Daily Weight in k.8 (2021 07:04)    PHYSICAL EXAM:  General: NAD, sitting in wheelchair, comfortable   Cardiovascular: +S1/S2  Respiratory: CTA B/L,   Gastrointestinal: soft, NT/ND  Extremities: LUE AV fistula intact,  LLE swelling, including foot,  DP pulse well felt   Neurological: AAOx2, Motor exam grossly intact, no sensory deficits  Skin: Decubitus bilateral heel pressure ulcers DTI,    Left groin and lateral thigh with staples. Left calf dressing with serosanguinous drainage, incisions: lateral (suture) and medial (staples).  Gangrenous toes on left foot.   Decubitus sacral and buttock ulcers present- unstageable- improving - now  stage 2 and healing well       Function:  OT: 2 person assist for toileting   1 person assist for transfers        MEDICATIONS  (STANDING):  clopidogrel Tablet 75 milliGRAM(s) Oral daily  dextrose 40% Gel 15 Gram(s) Oral once  dextrose 5%. 1000 milliLiter(s) (50 mL/Hr) IV Continuous <Continuous>  dextrose 5%. 1000 milliLiter(s) (100 mL/Hr) IV Continuous <Continuous>  dextrose 50% Injectable 25 Gram(s) IV Push once  dextrose 50% Injectable 12.5 Gram(s) IV Push once  dextrose 50% Injectable 25 Gram(s) IV Push once  diltiazem    milliGRAM(s) Oral daily  DULoxetine 60 milliGRAM(s) Oral daily  epoetin juan-epbx (RETACRIT) Injectable 23003 Unit(s) IV Push <User Schedule>  glucagon  Injectable 1 milliGRAM(s) IntraMuscular once  heparin   Injectable 5000 Unit(s) SubCutaneous every 8 hours  hydrALAZINE 25 milliGRAM(s) Oral every 12 hours  insulin lispro (ADMELOG) corrective regimen sliding scale   SubCutaneous two times a day with meals  isosorbide   mononitrate ER Tablet (IMDUR) 30 milliGRAM(s) Oral daily  melatonin 6 milliGRAM(s) Oral at bedtime  Nephro-pito 1 Tablet(s) Oral daily  pantoprazole    Tablet 40 milliGRAM(s) Oral two times a day  polyethylene glycol 3350 17 Gram(s) Oral daily  senna 2 Tablet(s) Oral at bedtime  tamsulosin 0.4 milliGRAM(s) Oral at bedtime    MEDICATIONS  (PRN):  acetaminophen   Tablet .. 650 milliGRAM(s) Oral every 6 hours PRN Temp greater or equal to 38C (100.4F), Mild Pain (1 - 3)  artificial  tears Solution 1 Drop(s) Both EYES two times a day PRN Dry Eyes    CAPILLARY BLOOD GLUCOSE      POCT Blood Glucose.: 108 mg/dL (2021 07:33)  POCT Blood Glucose.: 225 mg/dL (2021 16:27)

## 2021-04-21 NOTE — PROGRESS NOTE ADULT - ASSESSMENT
85 y/o M with extensive PMH including HTN, HLD, CAD, HFpEF, s/p Right CEA for Carotid artery disease, ESRD on HD 2d/week (M/Fri) via LUE fistula, s/p flecainide w/ ILR placed 6/2020, AS s/p TAVR, and PAD (RLE fem-pop bypass October 2020) who presented to [facility] on 3/13/21 with GI bleed, as well as LLE pain due to occulsion of L superficial fem artery.  He had a complicated hospital course and also had acute DVT, PNA, bilateral pleural effusions.  Pt apparently was not a candidate for an IVC filter.  #ESRD on HD  -HD MWF  #PAD  -s/p fem-pop bypass and revision  -Continue Plavix    #Acute left femoral vein DVT  -Not on full dose AC due to GIB and high risk. If H/H remains stable and pt without recurrent episodes, we may restart AC later on. Recommend discussing with family  -DVT ppx with HSQ  -Per chart - patient not a candidate for IVC filter??    #Acute blood loss anemia, stable, likely due to UGIB  -EGD showed multiple AVMs, cauterized during EGD  -Protonix 40mg po bid  -Monitor Hg/Hct, transfuse if Hg <8  -EPO with dialysis on M + F    #Chronic diastolic CHF, stable  #Bioprosthetic aortic valve  -TTE done 3/14/2021; reviewed reading  -AV normally functioning  -continue Cardizem 240mg daily    #HTN  -Chronic fluctuating SBP  -Continue Hydralazine 25mg BID for now and closely monitor BP  -C/w Imdur w/ holding parameters  -Continue Cardizem   -Monitor vitals    #DM-II, A1c 5.7 3/26  - ISS  - fingersticks q ac and hs    #BPH  -continue Flomax

## 2021-04-22 ENCOUNTER — APPOINTMENT (OUTPATIENT)
Age: 86
End: 2021-04-22

## 2021-04-22 LAB
ANION GAP SERPL CALC-SCNC: 11 MMOL/L — SIGNIFICANT CHANGE UP (ref 5–17)
BUN SERPL-MCNC: 28 MG/DL — HIGH (ref 7–23)
CALCIUM SERPL-MCNC: 9 MG/DL — SIGNIFICANT CHANGE UP (ref 8.4–10.5)
CHLORIDE SERPL-SCNC: 101 MMOL/L — SIGNIFICANT CHANGE UP (ref 96–108)
CO2 SERPL-SCNC: 28 MMOL/L — SIGNIFICANT CHANGE UP (ref 22–31)
CREAT SERPL-MCNC: 4.01 MG/DL — HIGH (ref 0.5–1.3)
GLUCOSE BLDC GLUCOMTR-MCNC: 150 MG/DL — HIGH (ref 70–99)
GLUCOSE BLDC GLUCOMTR-MCNC: 92 MG/DL — SIGNIFICANT CHANGE UP (ref 70–99)
GLUCOSE SERPL-MCNC: 99 MG/DL — SIGNIFICANT CHANGE UP (ref 70–99)
HCT VFR BLD CALC: 32.2 % — LOW (ref 39–50)
HGB BLD-MCNC: 10.6 G/DL — LOW (ref 13–17)
MCHC RBC-ENTMCNC: 31.2 PG — SIGNIFICANT CHANGE UP (ref 27–34)
MCHC RBC-ENTMCNC: 32.9 GM/DL — SIGNIFICANT CHANGE UP (ref 32–36)
MCV RBC AUTO: 94.7 FL — SIGNIFICANT CHANGE UP (ref 80–100)
NRBC # BLD: 0 /100 WBCS — SIGNIFICANT CHANGE UP (ref 0–0)
PLATELET # BLD AUTO: 232 K/UL — SIGNIFICANT CHANGE UP (ref 150–400)
POTASSIUM SERPL-MCNC: 4.1 MMOL/L — SIGNIFICANT CHANGE UP (ref 3.5–5.3)
POTASSIUM SERPL-SCNC: 4.1 MMOL/L — SIGNIFICANT CHANGE UP (ref 3.5–5.3)
RBC # BLD: 3.4 M/UL — LOW (ref 4.2–5.8)
RBC # FLD: 17.9 % — HIGH (ref 10.3–14.5)
SARS-COV-2 RNA SPEC QL NAA+PROBE: SIGNIFICANT CHANGE UP
SODIUM SERPL-SCNC: 140 MMOL/L — SIGNIFICANT CHANGE UP (ref 135–145)
WBC # BLD: 6.06 K/UL — SIGNIFICANT CHANGE UP (ref 3.8–10.5)
WBC # FLD AUTO: 6.06 K/UL — SIGNIFICANT CHANGE UP (ref 3.8–10.5)

## 2021-04-22 PROCEDURE — 99232 SBSQ HOSP IP/OBS MODERATE 35: CPT | Mod: GC

## 2021-04-22 RX ADMIN — HEPARIN SODIUM 5000 UNIT(S): 5000 INJECTION INTRAVENOUS; SUBCUTANEOUS at 21:10

## 2021-04-22 RX ADMIN — Medication 240 MILLIGRAM(S): at 05:18

## 2021-04-22 RX ADMIN — DULOXETINE HYDROCHLORIDE 60 MILLIGRAM(S): 30 CAPSULE, DELAYED RELEASE ORAL at 12:30

## 2021-04-22 RX ADMIN — HEPARIN SODIUM 5000 UNIT(S): 5000 INJECTION INTRAVENOUS; SUBCUTANEOUS at 05:18

## 2021-04-22 RX ADMIN — PANTOPRAZOLE SODIUM 40 MILLIGRAM(S): 20 TABLET, DELAYED RELEASE ORAL at 05:18

## 2021-04-22 RX ADMIN — HEPARIN SODIUM 5000 UNIT(S): 5000 INJECTION INTRAVENOUS; SUBCUTANEOUS at 15:30

## 2021-04-22 RX ADMIN — ISOSORBIDE MONONITRATE 30 MILLIGRAM(S): 60 TABLET, EXTENDED RELEASE ORAL at 12:29

## 2021-04-22 RX ADMIN — TAMSULOSIN HYDROCHLORIDE 0.4 MILLIGRAM(S): 0.4 CAPSULE ORAL at 21:10

## 2021-04-22 RX ADMIN — CLOPIDOGREL BISULFATE 75 MILLIGRAM(S): 75 TABLET, FILM COATED ORAL at 12:30

## 2021-04-22 RX ADMIN — Medication 1 TABLET(S): at 12:30

## 2021-04-22 RX ADMIN — Medication 6 MILLIGRAM(S): at 21:09

## 2021-04-22 RX ADMIN — PANTOPRAZOLE SODIUM 40 MILLIGRAM(S): 20 TABLET, DELAYED RELEASE ORAL at 17:45

## 2021-04-22 RX ADMIN — SENNA PLUS 2 TABLET(S): 8.6 TABLET ORAL at 21:10

## 2021-04-22 NOTE — PROGRESS NOTE ADULT - SUBJECTIVE AND OBJECTIVE BOX
No distress    Vital Signs Last 24 Hrs  T(C): 36.6 (04-22-21 @ 07:28), Max: 36.6 (04-22-21 @ 07:28)  T(F): 97.9 (04-22-21 @ 07:28), Max: 97.9 (04-22-21 @ 07:28)  HR: 64 (04-22-21 @ 12:34) (64 - 72)  BP: 125/54 (04-22-21 @ 12:34) (125/54 - 164/68)  RR: 16 (04-22-21 @ 12:34) (16 - 16)  SpO2: 100% (04-22-21 @ 12:34) (97% - 100%)    s1s2  b/l air entry  soft  tr edema b/l BARBRA KIM AVF patent                                                                                                  10.6   6.06  )-----------( 232      ( 22 Apr 2021 06:00 )             32.2     22 Apr 2021 06:00    140    |  101    |  28     ----------------------------<  99     4.1     |  28     |  4.01     Ca    9.0        22 Apr 2021 06:00  Phos  4.7       21 Apr 2021 14:30    acetaminophen   Tablet .. 650 milliGRAM(s) Oral every 6 hours PRN  artificial  tears Solution 1 Drop(s) Both EYES two times a day PRN  clopidogrel Tablet 75 milliGRAM(s) Oral daily  dextrose 40% Gel 15 Gram(s) Oral once  dextrose 5%. 1000 milliLiter(s) IV Continuous <Continuous>  dextrose 5%. 1000 milliLiter(s) IV Continuous <Continuous>  dextrose 50% Injectable 25 Gram(s) IV Push once  dextrose 50% Injectable 12.5 Gram(s) IV Push once  dextrose 50% Injectable 25 Gram(s) IV Push once  diltiazem    milliGRAM(s) Oral daily  DULoxetine 60 milliGRAM(s) Oral daily  epoetin juan-epbx (RETACRIT) Injectable 89701 Unit(s) IV Push <User Schedule>  glucagon  Injectable 1 milliGRAM(s) IntraMuscular once  heparin   Injectable 5000 Unit(s) SubCutaneous every 8 hours  insulin lispro (ADMELOG) corrective regimen sliding scale   SubCutaneous two times a day with meals  isosorbide   mononitrate ER Tablet (IMDUR) 30 milliGRAM(s) Oral daily  melatonin 6 milliGRAM(s) Oral at bedtime  Nephro-pito 1 Tablet(s) Oral daily  pantoprazole    Tablet 40 milliGRAM(s) Oral two times a day  polyethylene glycol 3350 17 Gram(s) Oral daily  senna 2 Tablet(s) Oral at bedtime  tamsulosin 0.4 milliGRAM(s) Oral at bedtime    A/P:    S/p complicated hospital course as per HPI  ESRD on HD  HD MWF  TMP 2kg w/HD as able  Epogen  Renal diet  Rehab    [phone]

## 2021-04-22 NOTE — PROGRESS NOTE ADULT - ATTENDING COMMENTS
Patient seen at bedside.   States that he feels good, but tired, sleep appears much improved.   Pain in left leg improving slowly.  Swelling improving   Labs stable    2. Patient showing good progress in therapy   Continue rehab program     3. Daughter updated over phone on 4/21.   Discussed case with team at surgeon's office. Recommend fu as outpatient.

## 2021-04-22 NOTE — PROGRESS NOTE ADULT - ASSESSMENT
Pt is a 85 y/o M with PMHx of HTN, HLD, CAD, HFpEF, s/p Right CEA for Carotid artery disease, ESRD on HD 2d/week (M/Fri) via LUE fistula, s/p flecainide w/ ILR placed 6/2020, AS s/p TAVR, and PAD who presented to [facility] on 3/13/21 with GI bleed, as well as LLE pain, found to have occlusion of left superficial femoral artery. Pt underwent EGD and AVM cauterizations. He also underwent left fem-pop bypass x2 and was found to have LLE DVT, treated with heparin. He has functional deficits of with his ambulation and his endurance.      Comprehensive rehab program: -PT/OT/ST-total of 3 hrs/day 5 days/week     #PAD:- Continue Plavix 75mg  - s/p fem-pop bypass and revision  - Completed Keflex 500mg bid for possible superficial infection at admission    #DVT  - left femoral vein DVT  - on Heparin sq tid  - ?patient not a candidate for IVC filter    #UGIB  - egd showed multiple AVMs, cauterized during EGD  - Protonix bid    #CKD  - on M/F dialysis with extra dialysis intermittently - has been getting Wednesday dialysis as well  - Renal consult  - Epoetin Yanick with dialysis    #CHF  - continue Cardizem 240mg daily  - Torsemide d/c by renal  - 4/9   - CXR with Pleural effusion  - Imdur 30mg daily    #HTN  - Hydralazine d/c'd  - continue to monitor    #DM:- ISS  - FS has been controlled - FS bid     #Pain  - Cymbalta 60mg daily  - Tylenol PRN, modalities to left knee    #BPH  - continue Flomax   - Patient with some spontaneous void     #GI  - Miralax, senna    #Skin  - Bilateral heel pressure ulcers DTI: off-load heels, Elevate leg while in bed   - Sacrum and buttock decubitus ulcers: were unstageable, starting to heal  - left calf dressing change BID and PRN - medial with staples and lateral with sutures  - staples present at groin and lateral thigh   vascular surgery consult noted     #Diet  - Soft and bite sized  - Consistent Carb, DASH/TLC, Renal diet    Hospitalist and renal fu noted     #Dispo  Team meeting DATE: 4/13   Patient progressing well in therapy   EDOD: 4/29      Hospitalist and renal fu  noted.

## 2021-04-22 NOTE — PROGRESS NOTE ADULT - SUBJECTIVE AND OBJECTIVE BOX
HPI:  Pt is a 87 y/o M with PMHx of HTN, HLD, CAD, HFpEF, s/p Right CEA for Carotid artery disease, ESRD on HD 2d/week (M/Fri) via LUE fistula, s/p flecainide w/ ILR placed 6/2020, AS s/p TAVR, and PAD (RLE fem-pop bypass October 2020) who presented to [facility] on 3/13/21 with GI bleed, as well as LLE pain. 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 When patient was stable from GI, medical, cardiac standpoint, he went for LLE angiogram on 3/18, and found occlusion of L Superficial femoral artery. He was planned for a LLE fem-pop bypass, and had the bypass performed on 3/20. On 3/22, patient was a RRT for AMS, fever. Work-up revealed pleural effusions and L femoral DVT. He was started on abx for presumed pna and hep gtt for DVT. Patient began to show signs of malperfusion with coolness to touch, mottling of skin to LLE. He was planned for revision of LLE bypass. Went to OR on 3/25 for LLE bypass revision with explant of PTFE bypass, CFA to AT bypass with cryovein performed. Intra-op patient with 1500 ml of blood loss requiring 8 unit PRBC, 4 unit FFP, 1 donor unit plt.  During procedure, pt lost function of PPM with period of asystole requiring transcutaneous pacing. Cardiology consulted and pacemaker interrogated in the OR.  Pt taken to PACU post op and SICU consulted. Patient went to SICU post-op. On 3/27 patient was extubated and was downgraded from SICU to floor the next day. Rest of hospital course was uneventful. He worked with PT, was evaluated by PM&R and approved for acute rehab. The patient remained on heparin sq while in the hospital for tx of dvt (his plavix was held). Upon discharge, approved by CHALINO and Dr. Albrecht to resume plavix. He will remain on heparin sq while at rehab for dvt tx and ppx. He is not a candidate for IVC filter. Upon discharge, pain is well controlled, tolerating diet, remains HD stable, no clinical signs/symptoms of GI bleed, LLE remains warm and well perfused, he has a strong LLE palpable graft pulse and dopplerable signals. Per attending, he is medically stable for discharge to [facility] and on 4/8/21 he was transferred to [facility] for acute inpatient rehabilitation.    SUBJECTIVE / INTERVAL HPI: Patient seen and examined at bedside. He is a little tired this morning, likely from a busy day of therapy and dialysis yesterday. He did not fall asleep until late last night. He denies any leg pain this morning and continues to be alert.     REVIEW OF SYSTEMS:    CONSTITUTIONAL: No fevers or chills  EYES/ENT: No visual changes;  No vertigo or throat pain   NECK: No pain or stiffness  RESPIRATORY: No cough, wheezing, or shortness of breath  CARDIOVASCULAR: No chest pain or palpitations  GASTROINTESTINAL: No abdominal or epigastric pain. No nausea or vomiting. No diarrhea or constipation.   GENITOURINARY: No dysuria, frequency or hematuria  NEUROLOGICAL: No numbness, +weakness  SKIN: No itching, rashes, +groin and leg incisions      VITAL SIGNS:  Vital Signs Last 24 Hrs  T(C): 36.6 (22 Apr 2021 07:28), Max: 36.7 (21 Apr 2021 19:31)  T(F): 97.9 (22 Apr 2021 07:28), Max: 98.1 (21 Apr 2021 19:31)  HR: 67 (22 Apr 2021 07:28) (58 - 72)  BP: 164/68 (22 Apr 2021 07:28) (107/67 - 164/68)  BP(mean): --  RR: 16 (22 Apr 2021 07:28) (16 - 16)  SpO2: 97% (22 Apr 2021 07:28) (97% - 100%)    PHYSICAL EXAM:    General: NAD, comfortably lying in bed  HEENT: NC/AT; PERRL, anicteric sclera; MMM  Neck: supple  Cardiovascular: +S1/S2, RRR  Respiratory: CTA B/L; no W/R/R, no crackles  Gastrointestinal: soft, NT/ND  Extremities: LUE AV fistula intact, LLE swelling (improving)  Neurological: AAOx2; no focal motor or sensory deficits, exams are intact  Skin: Decubitus bilateral heel pressure ulcers DTI,    Left groin and lateral thigh incisions with staples are clean with minimal drainage. Left calf dressing with serosanguinous drainage, incisions: lateral (suture) and medial (staples).  Gangrenous toes on left foot.   Decubitus sacral and buttock ulcers present- unstageable- improving - now  stage 2 and healing well     MEDICATIONS:  MEDICATIONS  (STANDING):  clopidogrel Tablet 75 milliGRAM(s) Oral daily  dextrose 40% Gel 15 Gram(s) Oral once  dextrose 5%. 1000 milliLiter(s) (50 mL/Hr) IV Continuous <Continuous>  dextrose 5%. 1000 milliLiter(s) (100 mL/Hr) IV Continuous <Continuous>  dextrose 50% Injectable 25 Gram(s) IV Push once  dextrose 50% Injectable 12.5 Gram(s) IV Push once  dextrose 50% Injectable 25 Gram(s) IV Push once  diltiazem    milliGRAM(s) Oral daily  DULoxetine 60 milliGRAM(s) Oral daily  epoetin juan-epbx (RETACRIT) Injectable 22722 Unit(s) IV Push <User Schedule>  glucagon  Injectable 1 milliGRAM(s) IntraMuscular once  heparin   Injectable 5000 Unit(s) SubCutaneous every 8 hours  insulin lispro (ADMELOG) corrective regimen sliding scale   SubCutaneous two times a day with meals  isosorbide   mononitrate ER Tablet (IMDUR) 30 milliGRAM(s) Oral daily  melatonin 6 milliGRAM(s) Oral at bedtime  Nephro-pito 1 Tablet(s) Oral daily  pantoprazole    Tablet 40 milliGRAM(s) Oral two times a day  polyethylene glycol 3350 17 Gram(s) Oral daily  senna 2 Tablet(s) Oral at bedtime  tamsulosin 0.4 milliGRAM(s) Oral at bedtime    MEDICATIONS  (PRN):  acetaminophen   Tablet .. 650 milliGRAM(s) Oral every 6 hours PRN Temp greater or equal to 38C (100.4F), Mild Pain (1 - 3)  artificial  tears Solution 1 Drop(s) Both EYES two times a day PRN Dry Eyes      ALLERGIES:  Allergies    Plavix (Hives)  Toprol-XL (Rash)    Intolerances        LABS:                        10.6   6.06  )-----------( 232      ( 22 Apr 2021 06:00 )             32.2     04-22    140  |  101  |  28<H>  ----------------------------<  99  4.1   |  28  |  4.01<H>    Ca    9.0      22 Apr 2021 06:00  Phos  4.7     04-21          CAPILLARY BLOOD GLUCOSE      POCT Blood Glucose.: 92 mg/dL (22 Apr 2021 07:33)      RADIOLOGY & ADDITIONAL TESTS: Reviewed.

## 2021-04-23 LAB
ALBUMIN SERPL ELPH-MCNC: 2.5 G/DL — LOW (ref 3.3–5)
ANION GAP SERPL CALC-SCNC: 11 MMOL/L — SIGNIFICANT CHANGE UP (ref 5–17)
BUN SERPL-MCNC: 49 MG/DL — HIGH (ref 7–23)
CALCIUM SERPL-MCNC: 8.5 MG/DL — SIGNIFICANT CHANGE UP (ref 8.4–10.5)
CHLORIDE SERPL-SCNC: 100 MMOL/L — SIGNIFICANT CHANGE UP (ref 96–108)
CO2 SERPL-SCNC: 26 MMOL/L — SIGNIFICANT CHANGE UP (ref 22–31)
CREAT SERPL-MCNC: 5.09 MG/DL — HIGH (ref 0.5–1.3)
GLUCOSE BLDC GLUCOMTR-MCNC: 153 MG/DL — HIGH (ref 70–99)
GLUCOSE BLDC GLUCOMTR-MCNC: 97 MG/DL — SIGNIFICANT CHANGE UP (ref 70–99)
GLUCOSE SERPL-MCNC: 121 MG/DL — HIGH (ref 70–99)
HCT VFR BLD CALC: 30.4 % — LOW (ref 39–50)
HGB BLD-MCNC: 9.8 G/DL — LOW (ref 13–17)
MCHC RBC-ENTMCNC: 30.6 PG — SIGNIFICANT CHANGE UP (ref 27–34)
MCHC RBC-ENTMCNC: 32.2 GM/DL — SIGNIFICANT CHANGE UP (ref 32–36)
MCV RBC AUTO: 95 FL — SIGNIFICANT CHANGE UP (ref 80–100)
NRBC # BLD: 0 /100 WBCS — SIGNIFICANT CHANGE UP (ref 0–0)
PHOSPHATE SERPL-MCNC: 5.1 MG/DL — HIGH (ref 2.5–4.5)
PLATELET # BLD AUTO: 236 K/UL — SIGNIFICANT CHANGE UP (ref 150–400)
POTASSIUM SERPL-MCNC: 4.7 MMOL/L — SIGNIFICANT CHANGE UP (ref 3.5–5.3)
POTASSIUM SERPL-SCNC: 4.7 MMOL/L — SIGNIFICANT CHANGE UP (ref 3.5–5.3)
RBC # BLD: 3.2 M/UL — LOW (ref 4.2–5.8)
RBC # FLD: 18.4 % — HIGH (ref 10.3–14.5)
SODIUM SERPL-SCNC: 137 MMOL/L — SIGNIFICANT CHANGE UP (ref 135–145)
WBC # BLD: 6.21 K/UL — SIGNIFICANT CHANGE UP (ref 3.8–10.5)
WBC # FLD AUTO: 6.21 K/UL — SIGNIFICANT CHANGE UP (ref 3.8–10.5)

## 2021-04-23 PROCEDURE — 99232 SBSQ HOSP IP/OBS MODERATE 35: CPT

## 2021-04-23 RX ADMIN — Medication 240 MILLIGRAM(S): at 05:08

## 2021-04-23 RX ADMIN — ERYTHROPOIETIN 10000 UNIT(S): 10000 INJECTION, SOLUTION INTRAVENOUS; SUBCUTANEOUS at 15:41

## 2021-04-23 RX ADMIN — HEPARIN SODIUM 5000 UNIT(S): 5000 INJECTION INTRAVENOUS; SUBCUTANEOUS at 22:09

## 2021-04-23 RX ADMIN — HEPARIN SODIUM 5000 UNIT(S): 5000 INJECTION INTRAVENOUS; SUBCUTANEOUS at 05:08

## 2021-04-23 RX ADMIN — ISOSORBIDE MONONITRATE 30 MILLIGRAM(S): 60 TABLET, EXTENDED RELEASE ORAL at 13:59

## 2021-04-23 RX ADMIN — PANTOPRAZOLE SODIUM 40 MILLIGRAM(S): 20 TABLET, DELAYED RELEASE ORAL at 18:37

## 2021-04-23 RX ADMIN — HEPARIN SODIUM 5000 UNIT(S): 5000 INJECTION INTRAVENOUS; SUBCUTANEOUS at 13:48

## 2021-04-23 RX ADMIN — Medication 2: at 07:41

## 2021-04-23 RX ADMIN — PANTOPRAZOLE SODIUM 40 MILLIGRAM(S): 20 TABLET, DELAYED RELEASE ORAL at 05:08

## 2021-04-23 RX ADMIN — Medication 650 MILLIGRAM(S): at 11:30

## 2021-04-23 RX ADMIN — CLOPIDOGREL BISULFATE 75 MILLIGRAM(S): 75 TABLET, FILM COATED ORAL at 13:59

## 2021-04-23 RX ADMIN — TAMSULOSIN HYDROCHLORIDE 0.4 MILLIGRAM(S): 0.4 CAPSULE ORAL at 22:09

## 2021-04-23 RX ADMIN — Medication 650 MILLIGRAM(S): at 10:58

## 2021-04-23 RX ADMIN — Medication 1 TABLET(S): at 13:47

## 2021-04-23 RX ADMIN — DULOXETINE HYDROCHLORIDE 60 MILLIGRAM(S): 30 CAPSULE, DELAYED RELEASE ORAL at 13:47

## 2021-04-23 RX ADMIN — Medication 6 MILLIGRAM(S): at 22:10

## 2021-04-23 RX ADMIN — SENNA PLUS 2 TABLET(S): 8.6 TABLET ORAL at 22:11

## 2021-04-23 NOTE — PROGRESS NOTE ADULT - ASSESSMENT
87 y/o M with extensive PMH including HTN, HLD, CAD, HFpEF, s/p Right CEA for Carotid artery disease, ESRD on HD 2d/week (M/Fri) via LUE fistula, s/p flecainide w/ ILR placed 6/2020, AS s/p TAVR, and PAD (RLE fem-pop bypass October 2020) who presented to [facility] on 3/13/21 with GI bleed, as well as LLE pain due to occulsion of L superficial fem artery.  He had a complicated hospital course and also had acute DVT, PNA, bilateral pleural effusions.  Pt apparently was not a candidate for an IVC filter.  #ESRD on HD  -HD MWF  #PAD  -s/p fem-pop bypass and revision  -Continue Plavix    #Acute left femoral vein DVT  -Not on full dose AC due to GIB and high risk. If H/H remains stable and pt without recurrent episodes, we may restart AC later on. Recommend discussing with family  -DVT ppx with HSQ  -Per chart - patient not a candidate for IVC filter??    #Acute blood loss anemia, stable, likely due to UGIB  -EGD showed multiple AVMs, cauterized during EGD  -Protonix 40mg po bid  -Monitor Hg/Hct, transfuse if Hg <8  -EPO with dialysis on M + F    #Chronic diastolic CHF, stable  #Bioprosthetic aortic valve  -TTE done 3/14/2021; reviewed reading  -AV normally functioning  -continue Cardizem 240mg daily    #HTN  -Chronic fluctuating SBP  -Continue Hydralazine 25mg BID for now and closely monitor BP  -C/w Imdur w/ holding parameters  -Continue Cardizem   -Monitor vitals    #DM-II, A1c 5.7 3/26  - ISS  - fingersticks q ac and hs    #BPH  -continue Flomax

## 2021-04-23 NOTE — PROGRESS NOTE ADULT - ATTENDING COMMENTS
Patient seen at bedside. Seated in chair   In good spirits and states that he feels well.   LLE pain only with activity.   Showing progress in therapy, but needs encouragement. Needs min assist with transfers/gait with walker   Labs on 4/22: stable    2. Hospitalist bernarda noted    3. Continue rehab program

## 2021-04-23 NOTE — CHART NOTE - NSCHARTNOTEFT_GEN_A_CORE
Nutrition Follow Up Note  Hospital Course   (Per Electronic Medical Record)    Source:  Patient Noted w/ Confusion  Medical Record [X]      Diet:   Consistent Carbohydrate, DASH-TLC, Renal, Soft (Dysphagia 3-Soft & Bite Sized) Diet w/ Thin Liquids  Tolerates Diet Consistency Well  Varied Intake @Meals (as Per Documentation)  on Nepro 8oz TID (Provides 1,275kcal & 57grams of Protein)    Enteral/Parenteral Nutrition: Not Applicable    Current Weight: 135.4lb on 4/23  Obtain Weights Daily   Weight Fluctuates     Pertinent Medications: MEDICATIONS  (STANDING):  clopidogrel Tablet 75 milliGRAM(s) Oral daily  dextrose 40% Gel 15 Gram(s) Oral once  dextrose 5%. 1000 milliLiter(s) (50 mL/Hr) IV Continuous <Continuous>  dextrose 5%. 1000 milliLiter(s) (100 mL/Hr) IV Continuous <Continuous>  dextrose 50% Injectable 25 Gram(s) IV Push once  dextrose 50% Injectable 12.5 Gram(s) IV Push once  dextrose 50% Injectable 25 Gram(s) IV Push once  diltiazem    milliGRAM(s) Oral daily  DULoxetine 60 milliGRAM(s) Oral daily  epoetin ujan-epbx (RETACRIT) Injectable 63576 Unit(s) IV Push <User Schedule>  glucagon  Injectable 1 milliGRAM(s) IntraMuscular once  heparin   Injectable 5000 Unit(s) SubCutaneous every 8 hours  insulin lispro (ADMELOG) corrective regimen sliding scale   SubCutaneous two times a day with meals  isosorbide   mononitrate ER Tablet (IMDUR) 30 milliGRAM(s) Oral daily  melatonin 6 milliGRAM(s) Oral at bedtime  Nephro-pito 1 Tablet(s) Oral daily  pantoprazole    Tablet 40 milliGRAM(s) Oral two times a day  polyethylene glycol 3350 17 Gram(s) Oral daily  senna 2 Tablet(s) Oral at bedtime  tamsulosin 0.4 milliGRAM(s) Oral at bedtime    MEDICATIONS  (PRN):  acetaminophen   Tablet .. 650 milliGRAM(s) Oral every 6 hours PRN Temp greater or equal to 38C (100.4F), Mild Pain (1 - 3)  artificial  tears Solution 1 Drop(s) Both EYES two times a day PRN Dry Eyes    Pertinent Labs:  04-22 Na140 mmol/L Glu 99 mg/dL K+ 4.1 mmol/L Cr  4.01 mg/dL<H> BUN 28 mg/dL<H> 04-21 Phos 4.7 mg/dL<H> 04-16 Alb 2.3 g/dL<L>    Skin: Unstageable Pressure Ulcer on Coccyx & Left Heel    Edema: +2 Edema Noted to Left Lower Extremity   (as Per Documentation)    Last Bowel Movement: on 4/23    Estimated Needs:   [X] No Change Since Previous Assessment    Previous Nutrition Diagnosis:   Moderate Malnutrition    Nutrition Diagnosis is [X] Ongoing - Continues on Nutrition Supplement     New Nutrition Diagnosis: [X] Not Applicable    Interventions:   1. Recommend Continue Nutrition Plan of Care     Monitoring & Evaluation:   [X] Weights   [X] PO Intake   [X] Skin Integrity   [X] Follow Up (Per Protocol)  [X] Tolerance to Diet Prescription   [X] Other: Labs & POCT    Registered Dietitian/Nutritionist Remains Available.  Juno Carbajal RDN    Pager # [phone]  Phone# [phone]

## 2021-04-23 NOTE — PROGRESS NOTE ADULT - SUBJECTIVE AND OBJECTIVE BOX
HPI:  Pt is a 85 y/o M with PMHx of HTN, HLD, CAD, HFpEF, s/p Right CEA for Carotid artery disease, ESRD on HD 2d/week (M/Fri) via LUE fistula, s/p flecainide w/ ILR placed 6/2020, AS s/p TAVR, and PAD (RLE fem-pop bypass October 2020) who presented to [facility] on 3/13/21 with GI bleed, as well as LLE pain. 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 When patient was stable from GI, medical, cardiac standpoint, he went for LLE angiogram on 3/18, and found occlusion of L Superficial femoral artery. He was planned for a LLE fem-pop bypass, and had the bypass performed on 3/20. On 3/22, patient was a RRT for AMS, fever. Work-up revealed pleural effusions and L femoral DVT. He was started on abx for presumed pna and hep gtt for DVT. Patient began to show signs of malperfusion with coolness to touch, mottling of skin to LLE. He was planned for revision of LLE bypass. Went to OR on 3/25 for LLE bypass revision with explant of PTFE bypass, CFA to AT bypass with cryovein performed. Intra-op patient with 1500 ml of blood loss requiring 8 unit PRBC, 4 unit FFP, 1 donor unit plt.  During procedure, pt lost function of PPM with period of asystole requiring transcutaneous pacing. Cardiology consulted and pacemaker interrogated in the OR.  Pt taken to PACU post op and SICU consulted. Patient went to SICU post-op. On 3/27 patient was extubated and was downgraded from SICU to floor the next day. Rest of hospital course was uneventful. He worked with PT, was evaluated by PM&R and approved for acute rehab. The patient remained on heparin sq while in the hospital for tx of dvt (his plavix was held). Upon discharge, approved by CHALINO and Dr. Albrecht to resume plavix. He will remain on heparin sq while at rehab for dvt tx and ppx. He is not a candidate for IVC filter. Upon discharge, pain is well controlled, tolerating diet, remains HD stable, no clinical signs/symptoms of GI bleed, LLE remains warm and well perfused, he has a strong LLE palpable graft pulse and dopplerable signals. Per attending, he is medically stable for discharge to AR and on 4/8/21 he was transferred to [facility] for acute inpatient rehabilitation.    SUBJECTIVE / INTERVAL HPI: Patient seen and examined at bedside. He is doing well this morning, was able to sleep overnight. He is due for dialysis later this afternoon. His left leg is not causing pain at rest.      REVIEW OF SYSTEMS:    CONSTITUTIONAL: No fevers or chills  EYES/ENT: No visual changes;  No vertigo or throat pain   NECK: No pain or stiffness  RESPIRATORY: No cough, wheezing, or shortness of breath  CARDIOVASCULAR: No chest pain or palpitations  GASTROINTESTINAL: No abdominal or epigastric pain. No nausea or vomiting. No diarrhea or constipation.   GENITOURINARY: No dysuria, frequency or hematuria  NEUROLOGICAL: No numbness, + weakness  SKIN: No itching, rashes, +incisions at left groin and leg      VITAL SIGNS:  Vital Signs Last 24 Hrs  T(C): 36.4 (23 Apr 2021 07:42), Max: 36.9 (22 Apr 2021 20:14)  T(F): 97.6 (23 Apr 2021 07:42), Max: 98.5 (22 Apr 2021 20:14)  HR: 71 (23 Apr 2021 07:42) (64 - 71)  BP: 150/53 (23 Apr 2021 07:42) (125/54 - 162/71)  BP(mean): --  RR: 16 (23 Apr 2021 07:42) (16 - 16)  SpO2: 99% (23 Apr 2021 07:42) (97% - 100%)    PHYSICAL EXAM:    General: NAD, sitting up comfortably in chair  HEENT: NC/AT; PERRL, anicteric sclera; MMM  Neck: supple  Cardiovascular: +S1/S2, RRR  Respiratory: CTA B/L; no wheezes, no crackles  Gastrointestinal: soft, NT/ND  Extremities: warm, well perfused; LUE AV fistula intact, LLE swelling is improving  Neurological: AAOx2, gross motor and sensory exams are full and intact  Skin: Decubitus bilateral heel pressure ulcers DTI,    Left groin and lateral thigh incisions with staples are clean with minimal drainage. Left calf dressing with serosanguinous drainage, incisions: lateral (suture) and medial (staples).  Gangrenous toes on left foot.   Decubitus sacral and buttock ulcers present- unstageable- improving - now  stage 2 and healing well     MEDICATIONS:  MEDICATIONS  (STANDING):  clopidogrel Tablet 75 milliGRAM(s) Oral daily  dextrose 40% Gel 15 Gram(s) Oral once  dextrose 5%. 1000 milliLiter(s) (50 mL/Hr) IV Continuous <Continuous>  dextrose 5%. 1000 milliLiter(s) (100 mL/Hr) IV Continuous <Continuous>  dextrose 50% Injectable 25 Gram(s) IV Push once  dextrose 50% Injectable 12.5 Gram(s) IV Push once  dextrose 50% Injectable 25 Gram(s) IV Push once  diltiazem    milliGRAM(s) Oral daily  DULoxetine 60 milliGRAM(s) Oral daily  epoetin juan-epbx (RETACRIT) Injectable 88402 Unit(s) IV Push <User Schedule>  glucagon  Injectable 1 milliGRAM(s) IntraMuscular once  heparin   Injectable 5000 Unit(s) SubCutaneous every 8 hours  insulin lispro (ADMELOG) corrective regimen sliding scale   SubCutaneous two times a day with meals  isosorbide   mononitrate ER Tablet (IMDUR) 30 milliGRAM(s) Oral daily  melatonin 6 milliGRAM(s) Oral at bedtime  Nephro-pito 1 Tablet(s) Oral daily  pantoprazole    Tablet 40 milliGRAM(s) Oral two times a day  polyethylene glycol 3350 17 Gram(s) Oral daily  senna 2 Tablet(s) Oral at bedtime  tamsulosin 0.4 milliGRAM(s) Oral at bedtime    MEDICATIONS  (PRN):  acetaminophen   Tablet .. 650 milliGRAM(s) Oral every 6 hours PRN Temp greater or equal to 38C (100.4F), Mild Pain (1 - 3)  artificial  tears Solution 1 Drop(s) Both EYES two times a day PRN Dry Eyes      ALLERGIES:  Allergies    Plavix (Hives)  Toprol-XL (Rash)    Intolerances        LABS:                        10.6   6.06  )-----------( 232      ( 22 Apr 2021 06:00 )             32.2     04-22    140  |  101  |  28<H>  ----------------------------<  99  4.1   |  28  |  4.01<H>    Ca    9.0      22 Apr 2021 06:00  Phos  4.7     04-21          CAPILLARY BLOOD GLUCOSE      POCT Blood Glucose.: 153 mg/dL (23 Apr 2021 07:27)      RADIOLOGY & ADDITIONAL TESTS: Reviewed.

## 2021-04-23 NOTE — PROGRESS NOTE ADULT - SUBJECTIVE AND OBJECTIVE BOX
Seen on HD    Vital Signs Last 24 Hrs  T(C): 36.7 (04-23-21 @ 15:00), Max: 36.9 (04-22-21 @ 20:14)  T(F): 98 (04-23-21 @ 15:00), Max: 98.5 (04-22-21 @ 20:14)  HR: 60 (04-23-21 @ 15:00) (57 - 71)  BP: 148/63 (04-23-21 @ 15:00) (112/73 - 162/71)  RR: 16 (04-23-21 @ 15:00) (16 - 16)  SpO2: 98% (04-23-21 @ 15:00) (97% - 99%)    s1s2  b/l air entry  soft  tr edema b/l LE, LUE AVF patent                                                                                                           9.8    6.21  )-----------( 236      ( 23 Apr 2021 14:50 )             30.4     23 Apr 2021 14:50    137    |  100    |  49     ----------------------------<  121    4.7     |  26     |  5.09     Ca    8.5        23 Apr 2021 14:50  Phos  5.1       23 Apr 2021 14:50    TPro  x      /  Alb  2.5    /  TBili  x      /  DBili  x      /  AST  x      /  ALT  x      /  AlkPhos  x      23 Apr 2021 14:50    LIVER FUNCTIONS - ( 23 Apr 2021 14:50 )  Alb: 2.5 g/dL / Pro: x     / ALK PHOS: x     / ALT: x     / AST: x     / GGT: x           acetaminophen   Tablet .. 650 milliGRAM(s) Oral every 6 hours PRN  artificial  tears Solution 1 Drop(s) Both EYES two times a day PRN  clopidogrel Tablet 75 milliGRAM(s) Oral daily  dextrose 40% Gel 15 Gram(s) Oral once  dextrose 5%. 1000 milliLiter(s) IV Continuous <Continuous>  dextrose 5%. 1000 milliLiter(s) IV Continuous <Continuous>  dextrose 50% Injectable 25 Gram(s) IV Push once  dextrose 50% Injectable 12.5 Gram(s) IV Push once  dextrose 50% Injectable 25 Gram(s) IV Push once  diltiazem    milliGRAM(s) Oral daily  DULoxetine 60 milliGRAM(s) Oral daily  epoetin juan-epbx (RETACRIT) Injectable 32631 Unit(s) IV Push <User Schedule>  glucagon  Injectable 1 milliGRAM(s) IntraMuscular once  heparin   Injectable 5000 Unit(s) SubCutaneous every 8 hours  insulin lispro (ADMELOG) corrective regimen sliding scale   SubCutaneous two times a day with meals  isosorbide   mononitrate ER Tablet (IMDUR) 30 milliGRAM(s) Oral daily  melatonin 6 milliGRAM(s) Oral at bedtime  Nephro-pito 1 Tablet(s) Oral daily  pantoprazole    Tablet 40 milliGRAM(s) Oral two times a day  polyethylene glycol 3350 17 Gram(s) Oral daily  senna 2 Tablet(s) Oral at bedtime  tamsulosin 0.4 milliGRAM(s) Oral at bedtime    A/P:    S/p complicated hospital course as per HPI  ESRD on HD  HD MWF  TMP 2kg w/HD as able  Epogen  Renal diet  Rehab    [phone]

## 2021-04-23 NOTE — PROGRESS NOTE ADULT - ASSESSMENT
Pt is a 87 y/o M with PMHx of HTN, HLD, CAD, HFpEF, s/p Right CEA for Carotid artery disease, ESRD on HD 2d/week (M/Fri) via LUE fistula, s/p flecainide w/ ILR placed 6/2020, AS s/p TAVR, and PAD who presented to [facility] on 3/13/21 with GI bleed, as well as LLE pain, found to have occlusion of left superficial femoral artery. Pt underwent EGD and AVM cauterizations. He also underwent left fem-pop bypass x2 and was found to have LLE DVT, treated with heparin. He has functional deficits of with his ambulation and his endurance.      Comprehensive rehab program: -PT/OT/ST-total of 3 hrs/day 5 days/week     #PAD:- Continue Plavix 75mg  - s/p fem-pop bypass and revision  - Completed Keflex 500mg bid for possible superficial infection at admission    #DVT  - left femoral vein DVT  - on Heparin sq tid  - ?patient not a candidate for IVC filter    #UGIB  - egd showed multiple AVMs, cauterized during EGD  - Protonix bid    #CKD  - on M/F dialysis with extra dialysis intermittently - has been getting Wednesday dialysis as well  - Renal consult  - Epoetin Yanick with dialysis  - labs with dialysis    #CHF  - continue Cardizem 240mg daily  - Torsemide d/c by renal  - 4/9   - CXR with Pleural effusion  - Imdur 30mg daily    #HTN  - Hydralazine d/c'd  - continue to monitor    #DM:- ISS  - FS has been controlled - FS bid     #Pain  - Cymbalta 60mg daily  - Tylenol PRN, modalities to left knee    #BPH  - continue Flomax   - Patient with some spontaneous void     #GI  - Miralax, senna    #Skin  - Bilateral heel pressure ulcers DTI: off-load heels, Elevate leg while in bed   - Sacrum and buttock decubitus ulcers: were unstageable, starting to heal  - left calf dressing change BID and PRN - medial with staples and lateral with sutures  - staples present at groin and lateral thigh   vascular surgery consult noted     #Diet  - Soft and bite sized  - Consistent Carb, DASH/TLC, Renal diet    Hospitalist and renal fu noted     #Dispo  Team meeting DATE: 4/13   Patient progressing well in therapy   EDOD: 4/29      Hospitalist and renal fu  noted.

## 2021-04-23 NOTE — PROGRESS NOTE ADULT - SUBJECTIVE AND OBJECTIVE BOX
Patient is a 86y old  Male who presents with a chief complaint of Impairment group -13 - PAD (22 Apr 2021 19:42)      Patient seen and examined at bedside.  Denies chest pain, dyspnea, abd pain.    ALLERGIES:  Plavix (Hives)  Toprol-XL (Rash)    MEDICATIONS  (STANDING):  clopidogrel Tablet 75 milliGRAM(s) Oral daily  dextrose 40% Gel 15 Gram(s) Oral once  dextrose 5%. 1000 milliLiter(s) (50 mL/Hr) IV Continuous <Continuous>  dextrose 5%. 1000 milliLiter(s) (100 mL/Hr) IV Continuous <Continuous>  dextrose 50% Injectable 25 Gram(s) IV Push once  dextrose 50% Injectable 12.5 Gram(s) IV Push once  dextrose 50% Injectable 25 Gram(s) IV Push once  diltiazem    milliGRAM(s) Oral daily  DULoxetine 60 milliGRAM(s) Oral daily  epoetin juan-epbx (RETACRIT) Injectable 33532 Unit(s) IV Push <User Schedule>  glucagon  Injectable 1 milliGRAM(s) IntraMuscular once  heparin   Injectable 5000 Unit(s) SubCutaneous every 8 hours  insulin lispro (ADMELOG) corrective regimen sliding scale   SubCutaneous two times a day with meals  isosorbide   mononitrate ER Tablet (IMDUR) 30 milliGRAM(s) Oral daily  melatonin 6 milliGRAM(s) Oral at bedtime  Nephro-pito 1 Tablet(s) Oral daily  pantoprazole    Tablet 40 milliGRAM(s) Oral two times a day  polyethylene glycol 3350 17 Gram(s) Oral daily  senna 2 Tablet(s) Oral at bedtime  tamsulosin 0.4 milliGRAM(s) Oral at bedtime    MEDICATIONS  (PRN):  acetaminophen   Tablet .. 650 milliGRAM(s) Oral every 6 hours PRN Temp greater or equal to 38C (100.4F), Mild Pain (1 - 3)  artificial  tears Solution 1 Drop(s) Both EYES two times a day PRN Dry Eyes    Vital Signs Last 24 Hrs  T(F): 97.6 (23 Apr 2021 07:42), Max: 98.5 (22 Apr 2021 20:14)  HR: 71 (23 Apr 2021 07:42) (64 - 71)  BP: 150/53 (23 Apr 2021 07:42) (125/54 - 162/71)  RR: 16 (23 Apr 2021 07:42) (16 - 16)  SpO2: 99% (23 Apr 2021 07:42) (97% - 100%)  I&O's Summary      PHYSICAL EXAM:  General: NAD, A/O x 3  ENT: MMM  Neck: Supple, No JVD  Lungs: Clear to auscultation bilaterally  Cardio: RRR, S1/S2, No murmurs  Abdomen: Soft, Nontender, Nondistended; Bowel sounds present  Extremities: No calf tenderness, No pitting edema    LABS:                        10.6   6.06  )-----------( 232      ( 22 Apr 2021 06:00 )             32.2       04-22    140  |  101  |  28  ----------------------------<  99  4.1   |  28  |  4.01    Ca    9.0      22 Apr 2021 06:00  Phos  4.7     04-21       eGFR if Non African American: 13 mL/min/1.73M2 (04-22-21 @ 06:00)  eGFR if African American: 15 mL/min/1.73M2 (04-22-21 @ 06:00)                           POCT Blood Glucose.: 153 mg/dL (23 Apr 2021 07:27)  POCT Blood Glucose.: 150 mg/dL (22 Apr 2021 16:02)            RADIOLOGY & ADDITIONAL TESTS:    Care Discussed with Consultants/Other Providers:

## 2021-04-24 LAB
GLUCOSE BLDC GLUCOMTR-MCNC: 102 MG/DL — HIGH (ref 70–99)
GLUCOSE BLDC GLUCOMTR-MCNC: 181 MG/DL — HIGH (ref 70–99)

## 2021-04-24 PROCEDURE — 99232 SBSQ HOSP IP/OBS MODERATE 35: CPT

## 2021-04-24 PROCEDURE — 99231 SBSQ HOSP IP/OBS SF/LOW 25: CPT | Mod: GC

## 2021-04-24 RX ADMIN — PANTOPRAZOLE SODIUM 40 MILLIGRAM(S): 20 TABLET, DELAYED RELEASE ORAL at 05:06

## 2021-04-24 RX ADMIN — Medication 240 MILLIGRAM(S): at 05:06

## 2021-04-24 RX ADMIN — CLOPIDOGREL BISULFATE 75 MILLIGRAM(S): 75 TABLET, FILM COATED ORAL at 11:40

## 2021-04-24 RX ADMIN — HEPARIN SODIUM 5000 UNIT(S): 5000 INJECTION INTRAVENOUS; SUBCUTANEOUS at 05:05

## 2021-04-24 RX ADMIN — TAMSULOSIN HYDROCHLORIDE 0.4 MILLIGRAM(S): 0.4 CAPSULE ORAL at 21:31

## 2021-04-24 RX ADMIN — ISOSORBIDE MONONITRATE 30 MILLIGRAM(S): 60 TABLET, EXTENDED RELEASE ORAL at 11:40

## 2021-04-24 RX ADMIN — SENNA PLUS 2 TABLET(S): 8.6 TABLET ORAL at 21:31

## 2021-04-24 RX ADMIN — HEPARIN SODIUM 5000 UNIT(S): 5000 INJECTION INTRAVENOUS; SUBCUTANEOUS at 16:05

## 2021-04-24 RX ADMIN — HEPARIN SODIUM 5000 UNIT(S): 5000 INJECTION INTRAVENOUS; SUBCUTANEOUS at 21:31

## 2021-04-24 RX ADMIN — PANTOPRAZOLE SODIUM 40 MILLIGRAM(S): 20 TABLET, DELAYED RELEASE ORAL at 17:00

## 2021-04-24 RX ADMIN — Medication 1 TABLET(S): at 11:40

## 2021-04-24 RX ADMIN — DULOXETINE HYDROCHLORIDE 60 MILLIGRAM(S): 30 CAPSULE, DELAYED RELEASE ORAL at 11:41

## 2021-04-24 RX ADMIN — Medication 6 MILLIGRAM(S): at 21:31

## 2021-04-24 RX ADMIN — Medication 2: at 16:56

## 2021-04-24 NOTE — PROGRESS NOTE ADULT - SUBJECTIVE AND OBJECTIVE BOX
Cc: Gait dysfunction    HPI: Patient with no new medical issues today  last Bm 4/23.  +Pain Left leg, no chest pain, no N/V, no Fevers/Chills. No other new ROS  Has been tolerating rehabilitation program.    MEDICATIONS  (STANDING):  clopidogrel Tablet 75 milliGRAM(s) Oral daily  dextrose 40% Gel 15 Gram(s) Oral once  dextrose 5%. 1000 milliLiter(s) (50 mL/Hr) IV Continuous <Continuous>  dextrose 5%. 1000 milliLiter(s) (100 mL/Hr) IV Continuous <Continuous>  dextrose 50% Injectable 25 Gram(s) IV Push once  dextrose 50% Injectable 12.5 Gram(s) IV Push once  dextrose 50% Injectable 25 Gram(s) IV Push once  diltiazem    milliGRAM(s) Oral daily  DULoxetine 60 milliGRAM(s) Oral daily  epoetin juan-epbx (RETACRIT) Injectable 74965 Unit(s) IV Push <User Schedule>  glucagon  Injectable 1 milliGRAM(s) IntraMuscular once  heparin   Injectable 5000 Unit(s) SubCutaneous every 8 hours  insulin lispro (ADMELOG) corrective regimen sliding scale   SubCutaneous two times a day with meals  isosorbide   mononitrate ER Tablet (IMDUR) 30 milliGRAM(s) Oral daily  melatonin 6 milliGRAM(s) Oral at bedtime  Nephro-pito 1 Tablet(s) Oral daily  pantoprazole    Tablet 40 milliGRAM(s) Oral two times a day  polyethylene glycol 3350 17 Gram(s) Oral daily  senna 2 Tablet(s) Oral at bedtime  tamsulosin 0.4 milliGRAM(s) Oral at bedtime    MEDICATIONS  (PRN):  acetaminophen   Tablet .. 650 milliGRAM(s) Oral every 6 hours PRN Temp greater or equal to 38C (100.4F), Mild Pain (1 - 3)  artificial  tears Solution 1 Drop(s) Both EYES two times a day PRN Dry Eyes      Vital Signs Last 24 Hrs  T(C): 36.7 (24 Apr 2021 08:29), Max: 36.7 (24 Apr 2021 08:29)  T(F): 98.1 (24 Apr 2021 08:29), Max: 98.1 (24 Apr 2021 08:29)  HR: 57 (24 Apr 2021 08:29) (57 - 72)  BP: 125/45 (24 Apr 2021 11:44) (122/45 - 150/61)  BP(mean): --  RR: 15 (24 Apr 2021 11:44) (14 - 16)  SpO2: 98% (24 Apr 2021 08:29) (97% - 99%)    In NAD  HEENT- EOMI  Heart- RRR, S1S2  Lungs- CTA bl.  Abd- + BS, NT  Ext- LLE Swelling/ bandaged- + calf tenderness  Neuro- Exam unchanged                          9.8    6.21  )-----------( 236      ( 23 Apr 2021 14:50 )             30.4     04-23    137  |  100  |  49<H>  ----------------------------<  121<H>  4.7   |  26  |  5.09<H>    Ca    8.5      23 Apr 2021 14:50  Phos  5.1     04-23    TPro  x   /  Alb  2.5<L>  /  TBili  x   /  DBili  x   /  AST  x   /  ALT  x   /  AlkPhos  x   04-23    CAPILLARY BLOOD GLUCOSE      POCT Blood Glucose.: 102 mg/dL (24 Apr 2021 07:20)  POCT Blood Glucose.: 97 mg/dL (23 Apr 2021 18:36)                Imp: Patient with diagnosis of gait abnormality admitted for comprehensive acute rehabilitation.    Plan:  - Continue therapies  - DVT prophylaxis- heparin  - Skin- Turn q2h, check skin daily  - Continue current medications; patient medically stable.   - Patient is stable to continue current rehabilitation program.

## 2021-04-24 NOTE — PROGRESS NOTE ADULT - ASSESSMENT
85 y/o M with extensive PMH including HTN, HLD, CAD, HFpEF, s/p Right CEA for Carotid artery disease, ESRD on HD 2d/week (M/Fri) via LUE fistula, s/p flecainide w/ ILR placed 6/2020, AS s/p TAVR, and PAD (RLE fem-pop bypass October 2020) who presented to [facility] on 3/13/21 with GI bleed, as well as LLE pain due to occulsion of L superficial fem artery.  He had a complicated hospital course and also had acute DVT, PNA, bilateral pleural effusions.  Pt apparently was not a candidate for an IVC filter.  #ESRD on HD  -HD MWF  #PAD  -s/p fem-pop bypass and revision  -Continue Plavix    #Acute left femoral vein DVT  -Not on full dose AC due to GIB and high risk. If H/H remains stable and pt without recurrent episodes, we may restart AC later on. Recommend discussing with family  -DVT ppx with HSQ  -Per chart - patient not a candidate for IVC filter??    #Acute blood loss anemia, stable, likely due to UGIB  -EGD showed multiple AVMs, cauterized during EGD  -Protonix 40mg po bid  -Monitor Hg/Hct, transfuse if Hg <8  -EPO with dialysis on M + F    #Chronic diastolic CHF, stable  #Bioprosthetic aortic valve  -TTE done 3/14/2021; reviewed reading  -AV normally functioning  -continue Cardizem 240mg daily    #HTN  -Chronic fluctuating SBP  -Continue Hydralazine 25mg BID for now and closely monitor BP  -C/w Imdur w/ holding parameters  -Continue Cardizem  -Monitor vitals    #DM-II, A1c 5.7 3/26  - ISS  - fingersticks q ac and hs    #BPH  -continue Flomax

## 2021-04-24 NOTE — PROGRESS NOTE ADULT - SUBJECTIVE AND OBJECTIVE BOX
Patient is a 86y old  Male who presents with a chief complaint of Impairment group -13 - PAD (23 Apr 2021 16:20)      Patient seen and examined at bedside.  Denies chest pain, dyspnea, abd pain.    ALLERGIES:  Plavix (Hives)  Toprol-XL (Rash)    MEDICATIONS  (STANDING):  clopidogrel Tablet 75 milliGRAM(s) Oral daily  dextrose 40% Gel 15 Gram(s) Oral once  dextrose 5%. 1000 milliLiter(s) (50 mL/Hr) IV Continuous <Continuous>  dextrose 5%. 1000 milliLiter(s) (100 mL/Hr) IV Continuous <Continuous>  dextrose 50% Injectable 25 Gram(s) IV Push once  dextrose 50% Injectable 12.5 Gram(s) IV Push once  dextrose 50% Injectable 25 Gram(s) IV Push once  diltiazem    milliGRAM(s) Oral daily  DULoxetine 60 milliGRAM(s) Oral daily  epoetin juan-epbx (RETACRIT) Injectable 38184 Unit(s) IV Push <User Schedule>  glucagon  Injectable 1 milliGRAM(s) IntraMuscular once  heparin   Injectable 5000 Unit(s) SubCutaneous every 8 hours  insulin lispro (ADMELOG) corrective regimen sliding scale   SubCutaneous two times a day with meals  isosorbide   mononitrate ER Tablet (IMDUR) 30 milliGRAM(s) Oral daily  melatonin 6 milliGRAM(s) Oral at bedtime  Nephro-pito 1 Tablet(s) Oral daily  pantoprazole    Tablet 40 milliGRAM(s) Oral two times a day  polyethylene glycol 3350 17 Gram(s) Oral daily  senna 2 Tablet(s) Oral at bedtime  tamsulosin 0.4 milliGRAM(s) Oral at bedtime    MEDICATIONS  (PRN):  acetaminophen   Tablet .. 650 milliGRAM(s) Oral every 6 hours PRN Temp greater or equal to 38C (100.4F), Mild Pain (1 - 3)  artificial  tears Solution 1 Drop(s) Both EYES two times a day PRN Dry Eyes    Vital Signs Last 24 Hrs  T(F): 98.1 (24 Apr 2021 08:29), Max: 98.1 (24 Apr 2021 08:29)  HR: 57 (24 Apr 2021 08:29) (57 - 72)  BP: 125/45 (24 Apr 2021 11:44) (112/73 - 150/61)  RR: 15 (24 Apr 2021 11:44) (14 - 16)  SpO2: 98% (24 Apr 2021 08:29) (97% - 99%)  I&O's Summary    23 Apr 2021 07:01  -  24 Apr 2021 07:00  --------------------------------------------------------  IN: 0 mL / OUT: 2000 mL / NET: -2000 mL        PHYSICAL EXAM:  General: NAD, A/O x 3  ENT: MMM  Neck: Supple, No JVD  Lungs: Clear to auscultation bilaterally  Cardio: RRR, S1/S2, No murmurs  Abdomen: Soft, Nontender, Nondistended; Bowel sounds present  Extremities: No calf tenderness, No pitting edema    LABS:                        9.8    6.21  )-----------( 236      ( 23 Apr 2021 14:50 )             30.4       04-23    137  |  100  |  49  ----------------------------<  121  4.7   |  26  |  5.09    Ca    8.5      23 Apr 2021 14:50  Phos  5.1     04-23    TPro  x   /  Alb  2.5  /  TBili  x   /  DBili  x   /  AST  x   /  ALT  x   /  AlkPhos  x   04-23     eGFR if Non African American: 9 mL/min/1.73M2 (04-23-21 @ 14:50)  eGFR if : 11 mL/min/1.73M2 (04-23-21 @ 14:50)                           POCT Blood Glucose.: 102 mg/dL (24 Apr 2021 07:20)  POCT Blood Glucose.: 97 mg/dL (23 Apr 2021 18:36)            RADIOLOGY & ADDITIONAL TESTS:    Care Discussed with Consultants/Other Providers:

## 2021-04-25 LAB
GLUCOSE BLDC GLUCOMTR-MCNC: 109 MG/DL — HIGH (ref 70–99)
GLUCOSE BLDC GLUCOMTR-MCNC: 245 MG/DL — HIGH (ref 70–99)

## 2021-04-25 PROCEDURE — 99232 SBSQ HOSP IP/OBS MODERATE 35: CPT

## 2021-04-25 PROCEDURE — 99231 SBSQ HOSP IP/OBS SF/LOW 25: CPT | Mod: GC

## 2021-04-25 RX ADMIN — CLOPIDOGREL BISULFATE 75 MILLIGRAM(S): 75 TABLET, FILM COATED ORAL at 12:17

## 2021-04-25 RX ADMIN — ISOSORBIDE MONONITRATE 30 MILLIGRAM(S): 60 TABLET, EXTENDED RELEASE ORAL at 12:18

## 2021-04-25 RX ADMIN — PANTOPRAZOLE SODIUM 40 MILLIGRAM(S): 20 TABLET, DELAYED RELEASE ORAL at 06:34

## 2021-04-25 RX ADMIN — Medication 6 MILLIGRAM(S): at 21:31

## 2021-04-25 RX ADMIN — HEPARIN SODIUM 5000 UNIT(S): 5000 INJECTION INTRAVENOUS; SUBCUTANEOUS at 14:07

## 2021-04-25 RX ADMIN — Medication 4: at 17:33

## 2021-04-25 RX ADMIN — PANTOPRAZOLE SODIUM 40 MILLIGRAM(S): 20 TABLET, DELAYED RELEASE ORAL at 17:33

## 2021-04-25 RX ADMIN — Medication 240 MILLIGRAM(S): at 06:34

## 2021-04-25 RX ADMIN — TAMSULOSIN HYDROCHLORIDE 0.4 MILLIGRAM(S): 0.4 CAPSULE ORAL at 21:31

## 2021-04-25 RX ADMIN — HEPARIN SODIUM 5000 UNIT(S): 5000 INJECTION INTRAVENOUS; SUBCUTANEOUS at 21:31

## 2021-04-25 RX ADMIN — Medication 1 TABLET(S): at 12:17

## 2021-04-25 RX ADMIN — SENNA PLUS 2 TABLET(S): 8.6 TABLET ORAL at 21:31

## 2021-04-25 RX ADMIN — HEPARIN SODIUM 5000 UNIT(S): 5000 INJECTION INTRAVENOUS; SUBCUTANEOUS at 06:34

## 2021-04-25 RX ADMIN — DULOXETINE HYDROCHLORIDE 60 MILLIGRAM(S): 30 CAPSULE, DELAYED RELEASE ORAL at 12:17

## 2021-04-25 NOTE — PROGRESS NOTE ADULT - SUBJECTIVE AND OBJECTIVE BOX
Patient is a 86y old  Male who presents with a chief complaint of Impairment group -13 - PAD (24 Apr 2021 16:41)      Patient seen and examined at bedside.  Denies chest pain, dyspnea, abd pain.    ALLERGIES:  Plavix (Hives)  Toprol-XL (Rash)    MEDICATIONS  (STANDING):  clopidogrel Tablet 75 milliGRAM(s) Oral daily  dextrose 40% Gel 15 Gram(s) Oral once  dextrose 5%. 1000 milliLiter(s) (50 mL/Hr) IV Continuous <Continuous>  dextrose 5%. 1000 milliLiter(s) (100 mL/Hr) IV Continuous <Continuous>  dextrose 50% Injectable 25 Gram(s) IV Push once  dextrose 50% Injectable 12.5 Gram(s) IV Push once  dextrose 50% Injectable 25 Gram(s) IV Push once  diltiazem    milliGRAM(s) Oral daily  DULoxetine 60 milliGRAM(s) Oral daily  epoetin juan-epbx (RETACRIT) Injectable 85593 Unit(s) IV Push <User Schedule>  glucagon  Injectable 1 milliGRAM(s) IntraMuscular once  heparin   Injectable 5000 Unit(s) SubCutaneous every 8 hours  insulin lispro (ADMELOG) corrective regimen sliding scale   SubCutaneous two times a day with meals  isosorbide   mononitrate ER Tablet (IMDUR) 30 milliGRAM(s) Oral daily  melatonin 6 milliGRAM(s) Oral at bedtime  Nephro-pito 1 Tablet(s) Oral daily  pantoprazole    Tablet 40 milliGRAM(s) Oral two times a day  polyethylene glycol 3350 17 Gram(s) Oral daily  senna 2 Tablet(s) Oral at bedtime  tamsulosin 0.4 milliGRAM(s) Oral at bedtime    MEDICATIONS  (PRN):  acetaminophen   Tablet .. 650 milliGRAM(s) Oral every 6 hours PRN Temp greater or equal to 38C (100.4F), Mild Pain (1 - 3)  artificial  tears Solution 1 Drop(s) Both EYES two times a day PRN Dry Eyes    Vital Signs Last 24 Hrs  T(F): 98.1 (25 Apr 2021 09:06), Max: 98.1 (25 Apr 2021 09:06)  HR: 62 (25 Apr 2021 09:06) (62 - 65)  BP: 135/69 (25 Apr 2021 09:06) (125/45 - 148/70)  RR: 14 (25 Apr 2021 09:06) (14 - 15)  SpO2: 99% (25 Apr 2021 09:06) (98% - 99%)  I&O's Summary      PHYSICAL EXAM:  General: NAD, A/O x 3  ENT: MMM  Neck: Supple, No JVD  Lungs: Clear to auscultation bilaterally  Cardio: RRR, S1/S2, No murmurs  Abdomen: Soft, Nontender, Nondistended; Bowel sounds present  Extremities: No calf tenderness, No pitting edema    LABS:                        9.8    6.21  )-----------( 236      ( 23 Apr 2021 14:50 )             30.4       04-23    137  |  100  |  49  ----------------------------<  121  4.7   |  26  |  5.09    Ca    8.5      23 Apr 2021 14:50  Phos  5.1     04-23    TPro  x   /  Alb  2.5  /  TBili  x   /  DBili  x   /  AST  x   /  ALT  x   /  AlkPhos  x   04-23     eGFR if Non African American: 9 mL/min/1.73M2 (04-23-21 @ 14:50)  eGFR if : 11 mL/min/1.73M2 (04-23-21 @ 14:50)                           POCT Blood Glucose.: 109 mg/dL (25 Apr 2021 08:33)  POCT Blood Glucose.: 181 mg/dL (24 Apr 2021 16:55)            RADIOLOGY & ADDITIONAL TESTS:    Care Discussed with Consultants/Other Providers:

## 2021-04-25 NOTE — PROGRESS NOTE ADULT - SUBJECTIVE AND OBJECTIVE BOX
Cc: Gait dysfunction    HPI: Patient with no new medical issues today Co being tired  last Bm 4/24.  +Pain Left leg, no chest pain, no N/V, no Fevers/Chills. No other new ROS  Has been tolerating rehabilitation program.    MEDICATIONS  (STANDING):  clopidogrel Tablet 75 milliGRAM(s) Oral daily  dextrose 40% Gel 15 Gram(s) Oral once  dextrose 5%. 1000 milliLiter(s) (50 mL/Hr) IV Continuous <Continuous>  dextrose 5%. 1000 milliLiter(s) (100 mL/Hr) IV Continuous <Continuous>  dextrose 50% Injectable 25 Gram(s) IV Push once  dextrose 50% Injectable 12.5 Gram(s) IV Push once  dextrose 50% Injectable 25 Gram(s) IV Push once  diltiazem    milliGRAM(s) Oral daily  DULoxetine 60 milliGRAM(s) Oral daily  epoetin juan-epbx (RETACRIT) Injectable 79996 Unit(s) IV Push <User Schedule>  glucagon  Injectable 1 milliGRAM(s) IntraMuscular once  heparin   Injectable 5000 Unit(s) SubCutaneous every 8 hours  insulin lispro (ADMELOG) corrective regimen sliding scale   SubCutaneous two times a day with meals  isosorbide   mononitrate ER Tablet (IMDUR) 30 milliGRAM(s) Oral daily  melatonin 6 milliGRAM(s) Oral at bedtime  Nephro-pito 1 Tablet(s) Oral daily  pantoprazole    Tablet 40 milliGRAM(s) Oral two times a day  polyethylene glycol 3350 17 Gram(s) Oral daily  senna 2 Tablet(s) Oral at bedtime  tamsulosin 0.4 milliGRAM(s) Oral at bedtime    MEDICATIONS  (PRN):  acetaminophen   Tablet .. 650 milliGRAM(s) Oral every 6 hours PRN Temp greater or equal to 38C (100.4F), Mild Pain (1 - 3)  artificial  tears Solution 1 Drop(s) Both EYES two times a day PRN Dry Eyes      Vital Signs Last 24 Hrs  T(C): 36.7 (25 Apr 2021 09:06), Max: 36.7 (24 Apr 2021 21:31)  T(F): 98.1 (25 Apr 2021 09:06), Max: 98.1 (25 Apr 2021 09:06)  HR: 62 (25 Apr 2021 09:06) (62 - 65)  BP: 135/69 (25 Apr 2021 09:06) (135/69 - 148/70)  BP(mean): --  RR: 14 (25 Apr 2021 09:06) (14 - 15)  SpO2: 99% (25 Apr 2021 09:06) (98% - 99%)    In NAD  HEENT- EOMI  Heart- RRR, S1S2  Lungs- CTA bl.  Abd- + BS, NT  Ext- LLE Swelling/ bandaged- + calf tenderness  Neuro- Exam unchanged                          9.8    6.21  )-----------( 236      ( 23 Apr 2021 14:50 )             30.4     04-23    137  |  100  |  49<H>  ----------------------------<  121<H>  4.7   |  26  |  5.09<H>    Ca    8.5      23 Apr 2021 14:50  Phos  5.1     04-23    TPro  x   /  Alb  2.5<L>  /  TBili  x   /  DBili  x   /  AST  x   /  ALT  x   /  AlkPhos  x   04-23    CAPILLARY BLOOD GLUCOSE      POCT Blood Glucose.: 245 mg/dL (25 Apr 2021 16:55)  POCT Blood Glucose.: 109 mg/dL (25 Apr 2021 08:33)                Imp: Patient with diagnosis of gait abnormality admitted for comprehensive acute rehabilitation.    Plan:  - Continue therapies  - DVT prophylaxis- heparin  - Skin- Turn q2h, check skin daily  - Continue current medications; patient medically stable.   - Patient is stable to continue current rehabilitation program.

## 2021-04-25 NOTE — PROGRESS NOTE ADULT - ASSESSMENT
87 y/o M with extensive PMH including HTN, HLD, CAD, HFpEF, s/p Right CEA for Carotid artery disease, ESRD on HD 2d/week (M/Fri) via LUE fistula, s/p flecainide w/ ILR placed 6/2020, AS s/p TAVR, and PAD (RLE fem-pop bypass October 2020) who presented to [facility] on 3/13/21 with GI bleed, as well as LLE pain due to occulsion of L superficial fem artery.  He had a complicated hospital course and also had acute DVT, PNA, bilateral pleural effusions.  Pt apparently was not a candidate for an IVC filter.  #ESRD on HD  -HD MWF  #PAD  -s/p fem-pop bypass and revision  -Continue Plavix    #Acute left femoral vein DVT  -Not on full dose AC due to GIB and high risk. If H/H remains stable and pt without recurrent episodes, we may restart AC later on. Recommend discussing with family  -DVT ppx with HSQ  -Per chart - patient not a candidate for IVC filter??    #Acute blood loss anemia, stable, likely due to UGIB  -EGD showed multiple AVMs, cauterized during EGD  -Protonix 40mg po bid  -Monitor Hg/Hct, transfuse if Hg <8  -EPO with dialysis on M + F    #Chronic diastolic CHF, stable  #Bioprosthetic aortic valve  -TTE done 3/14/2021; reviewed reading  -AV normally functioning  -continue Cardizem 240mg daily    #HTN  -Chronic fluctuating SBP  -Continue Hydralazine 25mg BID for now and closely monitor BP  -C/w Imdur w/ holding parameters  -Continue Cardizem  -Monitor vitals    #DM-II, A1c 5.7 3/26  - ISS  - fingersticks q ac and hs    #BPH  -continue Flomax

## 2021-04-26 LAB
ANION GAP SERPL CALC-SCNC: 9 MMOL/L — SIGNIFICANT CHANGE UP (ref 5–17)
BUN SERPL-MCNC: 65 MG/DL — HIGH (ref 7–23)
CALCIUM SERPL-MCNC: 9 MG/DL — SIGNIFICANT CHANGE UP (ref 8.4–10.5)
CHLORIDE SERPL-SCNC: 99 MMOL/L — SIGNIFICANT CHANGE UP (ref 96–108)
CO2 SERPL-SCNC: 27 MMOL/L — SIGNIFICANT CHANGE UP (ref 22–31)
CREAT SERPL-MCNC: 5.54 MG/DL — HIGH (ref 0.5–1.3)
GLUCOSE BLDC GLUCOMTR-MCNC: 114 MG/DL — HIGH (ref 70–99)
GLUCOSE BLDC GLUCOMTR-MCNC: 123 MG/DL — HIGH (ref 70–99)
GLUCOSE SERPL-MCNC: 124 MG/DL — HIGH (ref 70–99)
HCT VFR BLD CALC: 33 % — LOW (ref 39–50)
HGB BLD-MCNC: 10.6 G/DL — LOW (ref 13–17)
MCHC RBC-ENTMCNC: 30.8 PG — SIGNIFICANT CHANGE UP (ref 27–34)
MCHC RBC-ENTMCNC: 32.1 GM/DL — SIGNIFICANT CHANGE UP (ref 32–36)
MCV RBC AUTO: 95.9 FL — SIGNIFICANT CHANGE UP (ref 80–100)
NRBC # BLD: 0 /100 WBCS — SIGNIFICANT CHANGE UP (ref 0–0)
PHOSPHATE SERPL-MCNC: 4.1 MG/DL — SIGNIFICANT CHANGE UP (ref 2.5–4.5)
PLATELET # BLD AUTO: 212 K/UL — SIGNIFICANT CHANGE UP (ref 150–400)
POTASSIUM SERPL-MCNC: 4.8 MMOL/L — SIGNIFICANT CHANGE UP (ref 3.5–5.3)
POTASSIUM SERPL-SCNC: 4.8 MMOL/L — SIGNIFICANT CHANGE UP (ref 3.5–5.3)
RBC # BLD: 3.44 M/UL — LOW (ref 4.2–5.8)
RBC # FLD: 18.5 % — HIGH (ref 10.3–14.5)
SODIUM SERPL-SCNC: 135 MMOL/L — SIGNIFICANT CHANGE UP (ref 135–145)
WBC # BLD: 6.14 K/UL — SIGNIFICANT CHANGE UP (ref 3.8–10.5)
WBC # FLD AUTO: 6.14 K/UL — SIGNIFICANT CHANGE UP (ref 3.8–10.5)

## 2021-04-26 PROCEDURE — 99233 SBSQ HOSP IP/OBS HIGH 50: CPT

## 2021-04-26 PROCEDURE — 93971 EXTREMITY STUDY: CPT | Mod: 26,LT

## 2021-04-26 RX ADMIN — HEPARIN SODIUM 5000 UNIT(S): 5000 INJECTION INTRAVENOUS; SUBCUTANEOUS at 21:14

## 2021-04-26 RX ADMIN — Medication 6 MILLIGRAM(S): at 21:18

## 2021-04-26 RX ADMIN — Medication 650 MILLIGRAM(S): at 12:00

## 2021-04-26 RX ADMIN — Medication 1 TABLET(S): at 11:29

## 2021-04-26 RX ADMIN — PANTOPRAZOLE SODIUM 40 MILLIGRAM(S): 20 TABLET, DELAYED RELEASE ORAL at 16:48

## 2021-04-26 RX ADMIN — PANTOPRAZOLE SODIUM 40 MILLIGRAM(S): 20 TABLET, DELAYED RELEASE ORAL at 06:47

## 2021-04-26 RX ADMIN — SENNA PLUS 2 TABLET(S): 8.6 TABLET ORAL at 21:14

## 2021-04-26 RX ADMIN — TAMSULOSIN HYDROCHLORIDE 0.4 MILLIGRAM(S): 0.4 CAPSULE ORAL at 21:14

## 2021-04-26 RX ADMIN — Medication 650 MILLIGRAM(S): at 11:29

## 2021-04-26 RX ADMIN — DULOXETINE HYDROCHLORIDE 60 MILLIGRAM(S): 30 CAPSULE, DELAYED RELEASE ORAL at 11:29

## 2021-04-26 RX ADMIN — HEPARIN SODIUM 5000 UNIT(S): 5000 INJECTION INTRAVENOUS; SUBCUTANEOUS at 06:46

## 2021-04-26 RX ADMIN — Medication 240 MILLIGRAM(S): at 06:46

## 2021-04-26 RX ADMIN — HEPARIN SODIUM 5000 UNIT(S): 5000 INJECTION INTRAVENOUS; SUBCUTANEOUS at 14:01

## 2021-04-26 RX ADMIN — CLOPIDOGREL BISULFATE 75 MILLIGRAM(S): 75 TABLET, FILM COATED ORAL at 11:29

## 2021-04-26 RX ADMIN — ERYTHROPOIETIN 10000 UNIT(S): 10000 INJECTION, SOLUTION INTRAVENOUS; SUBCUTANEOUS at 18:10

## 2021-04-26 NOTE — PROVIDER CONTACT NOTE (OTHER) - SITUATION
Patient refused Covid swab
Pts morning BP: 188/66 HR: 71. Pt asymptomatic. Pt due for hydralazine and cardizem PO. Ayush Bartlett DO made aware. will continue to monitor.
Pt c/o of left leg pain and more swelling observed in left leg than previous day.
Patient stated " I cannot able to sleep"

## 2021-04-26 NOTE — PROGRESS NOTE ADULT - ASSESSMENT
87 y/o M with extensive PMH including HTN, HLD, CAD, HFpEF, s/p Right CEA for Carotid artery disease, ESRD on HD 2d/week (M/Fri) via LUE fistula, s/p flecainide w/ ILR placed 6/2020, AS s/p TAVR, and PAD (RLE fem-pop bypass October 2020) who presented to [facility] on 3/13/21 with GI bleed, as well as LLE pain due to occulsion of L superficial fem artery.  He had a complicated hospital course and also had acute DVT, PNA, bilateral pleural effusions.  Pt apparently was not a candidate for an IVC filter, now admitted for rehab- pt/ot/dvt ppx  #ESRD on HD  -HD MWF  #PAD  -s/p fem-pop bypass and revision  -Continue Plavix    #Acute left femoral vein DVT  -Not on full dose AC due to GIB and high risk.   -DVT ppx with HSQ  -Per chart - patient not a candidate for IVC filter??    #Acute blood loss anemia, stable, likely due to UGIB  -EGD showed multiple AVMs, cauterized during EGD  -Protonix 40mg po bid  -Monitor Hg/Hct, transfuse if Hg <8  -EPO with dialysis     #Chronic diastolic CHF, stable  #Bioprosthetic aortic valve  -TTE done 3/14/2021; reviewed reading  -AV normally functioning  -continue Cardizem 240mg daily    #HTN  -Continue Hydralazine   -C/w Imdur   -Continue Cardizem  -Monitor vitals    #DM-II, A1c 5.7 3/26  - ISS  - fingersticks q ac and hs  - A1C with Estimated Average Glucose Result: 5.7 % (03.26.21 @ 02:38)  - diet controlled    #BPH  -continue Flomax     will follow  d/w rehab attending 87 y/o M with extensive PMH including HTN, HLD, CAD, HFpEF, s/p Right CEA for Carotid artery disease, ESRD on HD 2d/week (M/Fri) via LUE fistula, s/p flecainide w/ ILR placed 6/2020, AS s/p TAVR, and PAD (RLE fem-pop bypass October 2020) who presented to [facility] on 3/13/21 with GI bleed, as well as LLE pain due to occlusion of L superficial fem artery.  He had a complicated hospital course and also had acute DVT, PNA, bilateral pleural effusions.  Pt apparently was not a candidate for an IVC filter, now admitted for rehab- pt/ot/dvt ppx  #ESRD on HD  -HD MWF  #PAD  -s/p fem-pop bypass and revision  -Continue Plavix    #Acute left femoral vein DVT  -Not on full dose AC due to GIB and high risk.   -DVT ppx with HSQ  -Per chart - patient not a candidate for IVC filter??  LLE edema- reassess after dialysis, check wound per wound care team, will monitor    #Acute blood loss anemia, stable, likely due to UGIB  -EGD showed multiple AVMs, cauterized during EGD  -Protonix 40mg po bid  -Monitor Hg/Hct, transfuse if Hg <8  -EPO with dialysis     #Chronic diastolic CHF, stable  #Bioprosthetic aortic valve  -TTE done 3/14/2021; reviewed reading  -AV normally functioning  -continue Cardizem 240mg daily    #HTN  -Continue Hydralazine   -C/w Imdur   -Continue Cardizem  -Monitor vitals    #DM-II, A1c 5.7 3/26  - ISS  - fingersticks q ac and hs  - A1C with Estimated Average Glucose Result: 5.7 % (03.26.21 @ 02:38)  - diet controlled    #BPH  -continue Flomax     will follow  d/w rehab attending 87 y/o M with extensive PMH including HTN, HLD, CAD, HFpEF, s/p Right CEA for Carotid artery disease, ESRD on HD 2d/week (M/Fri) via LUE fistula, s/p flecainide w/ ILR placed 6/2020, AS s/p TAVR, and PAD (RLE fem-pop bypass October 2020) who presented to [facility] on 3/13/21 with GI bleed, as well as LLE pain due to occlusion of L superficial fem artery.  He had a complicated hospital course and also had acute DVT, PNA, bilateral pleural effusions.  Pt apparently was not a candidate for an IVC filter, now admitted for rehab- pt/ot/dvt ppx  #ESRD on HD  -HD MWF  #PAD  -s/p fem-pop bypass and revision  -Continue Plavix    #Acute left femoral vein DVT  -Not on full dose AC due to GIB and high risk.   -DVT ppx with HSQ  -Per chart - patient not a candidate for IVC filter??  LLE edema- reassess after dialysis, check wound per wound care team, will monitor  < from: US Duplex Venous Lower Ext Complete, Bilateral (04.03.21 @ 13:54) >  Persistent thrombus in the left femoral, popliteal, tibial peroneal trunk and posterior tibial veins. No evidence of extension into the left common femoral vein.      #Acute blood loss anemia, stable, likely due to UGIB  -EGD showed multiple AVMs, cauterized during EGD  -Protonix 40mg po bid  -Monitor Hg/Hct, transfuse if Hg <8  -EPO with dialysis     #Chronic diastolic CHF, stable  #Bioprosthetic aortic valve  -TTE done 3/14/2021; reviewed reading  -AV normally functioning  -continue Cardizem 240mg daily    #HTN  -Continue Hydralazine   -C/w Imdur   -Continue Cardizem  -Monitor vitals    #DM-II, A1c 5.7 3/26  - ISS  - fingersticks q ac and hs  - A1C with Estimated Average Glucose Result: 5.7 % (03.26.21 @ 02:38)  - diet controlled    #BPH  -continue Flomax     will follow  d/w rehab attending

## 2021-04-26 NOTE — PROVIDER CONTACT NOTE (OTHER) - ASSESSMENT
Patient is alert and oriented x 3-4, confused at times
Pt's surgical incision with sutures and staples lightly opened and reddened. Leg more swollen than yesterday. Pt c/ of pain.

## 2021-04-26 NOTE — PROVIDER CONTACT NOTE (OTHER) - REASON
elevated BP
Surgical incison opening and left leg pain
Patient stated " I cannot able to sleep"
Patient refused Covid swab

## 2021-04-26 NOTE — PROGRESS NOTE ADULT - ASSESSMENT
Pt is a 87 y/o M with PMHx of HTN, HLD, CAD, HFpEF, s/p Right CEA for Carotid artery disease, ESRD on HD 2d/week (M/Fri) via LUE fistula, s/p flecainide w/ ILR placed 6/2020, AS s/p TAVR, and PAD who presented to [facility] on 3/13/21 with GI bleed, as well as LLE pain, found to have occlusion of left superficial femoral artery. Pt underwent EGD and AVM cauterizations. He also underwent left fem-pop bypass x2 and was found to have LLE DVT, treated with heparin. He has functional deficits of with his ambulation and his endurance.      Comprehensive rehab program: -PT/OT/ST-total of 3 hrs/day 5 days/week     #PAD:- Continue Plavix 75mg  - s/p fem-pop bypass and revision  - Completed Keflex 500mg bid for possible superficial infection at admission    #DVT  - left femoral vein DVT  - on Heparin sq tid  - ?patient not a candidate for IVC filter    #UGIB  - egd showed multiple AVMs, cauterized during EGD  - Protonix bid    #CKD  - on M/F dialysis with extra dialysis intermittently - has been getting Wednesday dialysis as well  - Renal consult  - Epoetin Yanick with dialysis  - labs with dialysis    #CHF  - continue Cardizem 240mg daily  - Torsemide d/c by renal  - 4/9   - CXR with Pleural effusion  - Imdur 30mg daily    #HTN  - Hydralazine d/c'd  - continue to monitor    #DM  - ISS  - FS has been controlled - FS bid     #Pain  - Cymbalta 60mg daily  - Tylenol PRN, modalities to left knee    #BPH  - continue Flomax   - Patient with some spontaneous void     #GI  - Miralax, senna    #Skin  - Bilateral heel pressure ulcers DTI: off-load heels, Elevate leg while in bed   - Sacrum and buttock decubitus ulcers: were unstageable, starting to heal  - left calf dressing change BID and PRN - medial with staples and lateral with sutures  - staples present at groin and lateral thigh   vascular surgery consult noted     #Diet  - Soft and bite sized  - Consistent Carb, DASH/TLC, Renal diet    Hospitalist and renal fu noted     #Dispo  Team meeting DATE: 4/13   Patient progressing well in therapy   EDOD: 4/29      Hospitalist and renal fu  noted.

## 2021-04-26 NOTE — PROGRESS NOTE ADULT - SUBJECTIVE AND OBJECTIVE BOX
HPI:  Pt is a 85 y/o M with PMHx of HTN, HLD, CAD, HFpEF, s/p Right CEA for Carotid artery disease, ESRD on HD 2d/week (M/Fri) via LUE fistula, s/p flecainide w/ ILR placed 6/2020, AS s/p TAVR, and PAD (RLE fem-pop bypass October 2020) who presented to [facility] on 3/13/21 with GI bleed, as well as LLE pain. 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 When patient was stable from GI, medical, cardiac standpoint, he went for LLE angiogram on 3/18, and found occlusion of L Superficial femoral artery. He was planned for a LLE fem-pop bypass, and had the bypass performed on 3/20. On 3/22, patient was a RRT for AMS, fever. Work-up revealed pleural effusions and L femoral DVT. He was started on abx for presumed pna and hep gtt for DVT. Patient began to show signs of malperfusion with coolness to touch, mottling of skin to LLE. He was planned for revision of LLE bypass. Went to OR on 3/25 for LLE bypass revision with explant of PTFE bypass, CFA to AT bypass with cryovein performed. Intra-op patient with 1500 ml of blood loss requiring 8 unit PRBC, 4 unit FFP, 1 donor unit plt.  During procedure, pt lost function of PPM with period of asystole requiring transcutaneous pacing. Cardiology consulted and pacemaker interrogated in the OR.  Pt taken to PACU post op and SICU consulted. Patient went to SICU post-op. On 3/27 patient was extubated and was downgraded from SICU to floor the next day. Rest of hospital course was uneventful. He worked with PT, was evaluated by PM&R and approved for acute rehab. The patient remained on heparin sq while in the hospital for tx of dvt (his plavix was held). Upon discharge, approved by CHALINO and Dr. Albrecht to resume plavix. He will remain on heparin sq while at rehab for dvt tx and ppx. He is not a candidate for IVC filter. Upon discharge, pain is well controlled, tolerating diet, remains HD stable, no clinical signs/symptoms of GI bleed, LLE remains warm and well perfused, he has a strong LLE palpable graft pulse and dopplerable signals. Per attending, he is medically stable for discharge to [facility] and on 4/8/21 he was transferred to [facility] for acute inpatient rehabilitation.    SUBJECTIVE / INTERVAL HPI: Patient seen and examined at bedside. He is doing well this morning and slept well overnight. His weekend went well. He denies any shortness of breath this morning, and is due for dialysis in the afternoon. He denies any pain in his left lower leg while lying down at rest.      REVIEW OF SYSTEMS:    CONSTITUTIONAL: No fevers or chills  EYES/ENT: No visual changes;  No vertigo or throat pain   NECK: No pain or stiffness  RESPIRATORY: No cough, wheezing, or shortness of breath  CARDIOVASCULAR: No chest pain or palpitations  GASTROINTESTINAL: No abdominal or epigastric pain. No nausea or vomiting. No diarrhea or constipation.   GENITOURINARY: No dysuria, frequency or hematuria  NEUROLOGICAL: No numbness or weakness  SKIN: No itching, rashes, left leg incisions      VITAL SIGNS:  Vital Signs Last 24 Hrs  T(C): 36.4 (26 Apr 2021 07:14), Max: 36.9 (25 Apr 2021 21:31)  T(F): 97.6 (26 Apr 2021 07:14), Max: 98.4 (25 Apr 2021 21:31)  HR: 65 (26 Apr 2021 07:14) (65 - 68)  BP: 164/67 (26 Apr 2021 07:14) (128/74 - 164/67)  BP(mean): --  RR: 14 (26 Apr 2021 07:14) (14 - 15)  SpO2: 97% (26 Apr 2021 07:14) (97% - 98%)    PHYSICAL EXAM:    General: NAD, comfortably sitting up in bed eating breakfast  HEENT: NC/AT; PERRL, anicteric sclera; MMM  Neck: supple  Cardiovascular: +S1/S2, RRR  Respiratory: CTA B/L; no W/R/R, no crackles  Gastrointestinal: soft, NT/ND  Extremities: warm, well perfused; LUE AV fistula is intact. LLE swelling present, stable from previous days  Neurological: AAOx3; no focal deficits, motor and sensory exams are grossly intact  Skin: Decubitus bilateral heel pressure ulcers DTI,    Left groin and lateral thigh incisions with staples are clean with minimal drainage. Left calf dressing with serosanguinous drainage, incisions: lateral (suture) and medial (staples).  Gangrenous toes on left foot.   Decubitus sacral and buttock ulcers present- unstageable- improving - now  stage 2 and healing well     MEDICATIONS:  MEDICATIONS  (STANDING):  clopidogrel Tablet 75 milliGRAM(s) Oral daily  dextrose 40% Gel 15 Gram(s) Oral once  dextrose 5%. 1000 milliLiter(s) (50 mL/Hr) IV Continuous <Continuous>  dextrose 5%. 1000 milliLiter(s) (100 mL/Hr) IV Continuous <Continuous>  dextrose 50% Injectable 25 Gram(s) IV Push once  dextrose 50% Injectable 12.5 Gram(s) IV Push once  dextrose 50% Injectable 25 Gram(s) IV Push once  diltiazem    milliGRAM(s) Oral daily  DULoxetine 60 milliGRAM(s) Oral daily  epoetin juan-epbx (RETACRIT) Injectable 96131 Unit(s) IV Push <User Schedule>  glucagon  Injectable 1 milliGRAM(s) IntraMuscular once  heparin   Injectable 5000 Unit(s) SubCutaneous every 8 hours  insulin lispro (ADMELOG) corrective regimen sliding scale   SubCutaneous two times a day with meals  isosorbide   mononitrate ER Tablet (IMDUR) 30 milliGRAM(s) Oral daily  melatonin 6 milliGRAM(s) Oral at bedtime  Nephro-pito 1 Tablet(s) Oral daily  pantoprazole    Tablet 40 milliGRAM(s) Oral two times a day  polyethylene glycol 3350 17 Gram(s) Oral daily  senna 2 Tablet(s) Oral at bedtime  tamsulosin 0.4 milliGRAM(s) Oral at bedtime    MEDICATIONS  (PRN):  acetaminophen   Tablet .. 650 milliGRAM(s) Oral every 6 hours PRN Temp greater or equal to 38C (100.4F), Mild Pain (1 - 3)  artificial  tears Solution 1 Drop(s) Both EYES two times a day PRN Dry Eyes      ALLERGIES:  Allergies    Plavix (Hives)  Toprol-XL (Rash)    Intolerances        LABS:              CAPILLARY BLOOD GLUCOSE      POCT Blood Glucose.: 114 mg/dL (26 Apr 2021 07:34)      RADIOLOGY & ADDITIONAL TESTS: Reviewed.

## 2021-04-26 NOTE — PROGRESS NOTE ADULT - SUBJECTIVE AND OBJECTIVE BOX
86y old  Male s/p fem-pop bypass and revision      Patient seen and examined at bedside.  Denies chest pain, dyspnea, abd pain.    Vital Signs Last 24 Hrs  T(C): 36.4 (26 Apr 2021 07:14), Max: 36.9 (25 Apr 2021 21:31)  T(F): 97.6 (26 Apr 2021 07:14), Max: 98.4 (25 Apr 2021 21:31)  HR: 65 (26 Apr 2021 07:14) (65 - 68)  BP: 164/67 (26 Apr 2021 07:14) (128/74 - 164/67)  BP(mean): --  RR: 14 (26 Apr 2021 07:14) (14 - 15)  SpO2: 97% (26 Apr 2021 07:14) (97% - 98%)    GENERAL- NAD  EAR/NOSE/MOUTH/THROAT - no pharyngeal exudates, no oral lesions, MMM  EYES- DAISY, conjunctiva and Sclera clear  NECK- supple  RESPIRATORY-  clear to auscultation bilaterally  CARDIOVASCULAR - SIS2, RRR  GI - soft NT BS present  EXTREMITIES- no pedal edema  NEUROLOGY- no gross focal deficits  PSYCHIATRY- AAO X 3    CAPILLARY BLOOD GLUCOSE      POCT Blood Glucose.: 114 mg/dL (26 Apr 2021 07:34)  POCT Blood Glucose.: 245 mg/dL (25 Apr 2021 16:55)    A1C with Estimated Average Glucose Result: 5.7 % (03.26.21 @ 02:38)        MEDICATIONS  (STANDING):  clopidogrel Tablet 75 milliGRAM(s) Oral daily  diltiazem    milliGRAM(s) Oral daily  DULoxetine 60 milliGRAM(s) Oral daily  epoetin juan-epbx (RETACRIT) Injectable 20689 Unit(s) IV Push <User Schedule>  glucagon  Injectable 1 milliGRAM(s) IntraMuscular once  heparin   Injectable 5000 Unit(s) SubCutaneous every 8 hours  insulin lispro (ADMELOG) corrective regimen sliding scale   SubCutaneous two times a day with meals  isosorbide   mononitrate ER Tablet (IMDUR) 30 milliGRAM(s) Oral daily  melatonin 6 milliGRAM(s) Oral at bedtime  Nephro-pito 1 Tablet(s) Oral daily  pantoprazole    Tablet 40 milliGRAM(s) Oral two times a day  polyethylene glycol 3350 17 Gram(s) Oral daily  senna 2 Tablet(s) Oral at bedtime  tamsulosin 0.4 milliGRAM(s) Oral at bedtime    MEDICATIONS  (PRN):  acetaminophen   Tablet .. 650 milliGRAM(s) Oral every 6 hours PRN Temp greater or equal to 38C (100.4F), Mild Pain (1 - 3)  artificial  tears Solution 1 Drop(s) Both EYES two times a day PRN Dry Eyes   86y old  Male s/p fem-pop bypass and revision      Patient seen and examined at bedside.  Denies chest pain, dyspnea, abd pain.    Vital Signs Last 24 Hrs  T(C): 36.4 (26 Apr 2021 07:14), Max: 36.9 (25 Apr 2021 21:31)  T(F): 97.6 (26 Apr 2021 07:14), Max: 98.4 (25 Apr 2021 21:31)  HR: 65 (26 Apr 2021 07:14) (65 - 68)  BP: 164/67 (26 Apr 2021 07:14) (128/74 - 164/67)  BP(mean): --  RR: 14 (26 Apr 2021 07:14) (14 - 15)  SpO2: 97% (26 Apr 2021 07:14) (97% - 98%)    GENERAL- NAD  EAR/NOSE/MOUTH/THROAT - no pharyngeal exudates, no oral lesions, MMM  EYES- DAISY, conjunctiva and Sclera clear  NECK- supple  RESPIRATORY-  clear to auscultation bilaterally  CARDIOVASCULAR - SIS2, RRR  GI - soft NT BS present  EXTREMITIES- LLE edema  SKIN- LLE wounds dressing+  NEUROLOGY- no gross focal deficits  PSYCHIATRY- AAO X 3    CAPILLARY BLOOD GLUCOSE      POCT Blood Glucose.: 114 mg/dL (26 Apr 2021 07:34)  POCT Blood Glucose.: 245 mg/dL (25 Apr 2021 16:55)    A1C with Estimated Average Glucose Result: 5.7 % (03.26.21 @ 02:38)        MEDICATIONS  (STANDING):  clopidogrel Tablet 75 milliGRAM(s) Oral daily  diltiazem    milliGRAM(s) Oral daily  DULoxetine 60 milliGRAM(s) Oral daily  epoetin juan-epbx (RETACRIT) Injectable 07972 Unit(s) IV Push <User Schedule>  glucagon  Injectable 1 milliGRAM(s) IntraMuscular once  heparin   Injectable 5000 Unit(s) SubCutaneous every 8 hours  insulin lispro (ADMELOG) corrective regimen sliding scale   SubCutaneous two times a day with meals  isosorbide   mononitrate ER Tablet (IMDUR) 30 milliGRAM(s) Oral daily  melatonin 6 milliGRAM(s) Oral at bedtime  Nephro-pito 1 Tablet(s) Oral daily  pantoprazole    Tablet 40 milliGRAM(s) Oral two times a day  polyethylene glycol 3350 17 Gram(s) Oral daily  senna 2 Tablet(s) Oral at bedtime  tamsulosin 0.4 milliGRAM(s) Oral at bedtime    MEDICATIONS  (PRN):  acetaminophen   Tablet .. 650 milliGRAM(s) Oral every 6 hours PRN Temp greater or equal to 38C (100.4F), Mild Pain (1 - 3)  artificial  tears Solution 1 Drop(s) Both EYES two times a day PRN Dry Eyes   86y old  Male s/p GIB, s/p fem-pop bypass and revision      Patient seen and examined at bedside. c/o left LE swelling   Denies chest pain, dyspnea, abd pain.    Vital Signs Last 24 Hrs  T(C): 36.4 (26 Apr 2021 07:14), Max: 36.9 (25 Apr 2021 21:31)  T(F): 97.6 (26 Apr 2021 07:14), Max: 98.4 (25 Apr 2021 21:31)  HR: 65 (26 Apr 2021 07:14) (65 - 68)  BP: 164/67 (26 Apr 2021 07:14) (128/74 - 164/67)  BP(mean): --  RR: 14 (26 Apr 2021 07:14) (14 - 15)  SpO2: 97% (26 Apr 2021 07:14) (97% - 98%)    GENERAL- NAD  EAR/NOSE/MOUTH/THROAT - no pharyngeal exudates, no oral lesions, MMM  EYES- DAISY, conjunctiva and Sclera clear  NECK- supple  RESPIRATORY-  clear to auscultation bilaterally  CARDIOVASCULAR - SIS2, RRR  GI - soft NT BS present  EXTREMITIES- LLE edema  SKIN- LLE wounds dressing+  NEUROLOGY- no gross focal deficits  PSYCHIATRY- AAO X 3    CAPILLARY BLOOD GLUCOSE      POCT Blood Glucose.: 114 mg/dL (26 Apr 2021 07:34)  POCT Blood Glucose.: 245 mg/dL (25 Apr 2021 16:55)    A1C with Estimated Average Glucose Result: 5.7 % (03.26.21 @ 02:38)        MEDICATIONS  (STANDING):  clopidogrel Tablet 75 milliGRAM(s) Oral daily  diltiazem    milliGRAM(s) Oral daily  DULoxetine 60 milliGRAM(s) Oral daily  epoetin juan-epbx (RETACRIT) Injectable 38944 Unit(s) IV Push <User Schedule>  glucagon  Injectable 1 milliGRAM(s) IntraMuscular once  heparin   Injectable 5000 Unit(s) SubCutaneous every 8 hours  insulin lispro (ADMELOG) corrective regimen sliding scale   SubCutaneous two times a day with meals  isosorbide   mononitrate ER Tablet (IMDUR) 30 milliGRAM(s) Oral daily  melatonin 6 milliGRAM(s) Oral at bedtime  Nephro-pito 1 Tablet(s) Oral daily  pantoprazole    Tablet 40 milliGRAM(s) Oral two times a day  polyethylene glycol 3350 17 Gram(s) Oral daily  senna 2 Tablet(s) Oral at bedtime  tamsulosin 0.4 milliGRAM(s) Oral at bedtime    MEDICATIONS  (PRN):  acetaminophen   Tablet .. 650 milliGRAM(s) Oral every 6 hours PRN Temp greater or equal to 38C (100.4F), Mild Pain (1 - 3)  artificial  tears Solution 1 Drop(s) Both EYES two times a day PRN Dry Eyes

## 2021-04-26 NOTE — PROVIDER CONTACT NOTE (OTHER) - BACKGROUND
86 yrs old M admitted to  on 04/08/21 for impairment status post left fem-po bypass and subseequent DVT.
S/P TAVR, Fempop, Hemodialysis
S/P TAVR, Fempop, Hemodialysis

## 2021-04-27 LAB
GLUCOSE BLDC GLUCOMTR-MCNC: 157 MG/DL — HIGH (ref 70–99)
GLUCOSE BLDC GLUCOMTR-MCNC: 213 MG/DL — HIGH (ref 70–99)

## 2021-04-27 PROCEDURE — 99233 SBSQ HOSP IP/OBS HIGH 50: CPT

## 2021-04-27 PROCEDURE — 99232 SBSQ HOSP IP/OBS MODERATE 35: CPT | Mod: GC

## 2021-04-27 RX ADMIN — ISOSORBIDE MONONITRATE 30 MILLIGRAM(S): 60 TABLET, EXTENDED RELEASE ORAL at 11:42

## 2021-04-27 RX ADMIN — HEPARIN SODIUM 5000 UNIT(S): 5000 INJECTION INTRAVENOUS; SUBCUTANEOUS at 13:07

## 2021-04-27 RX ADMIN — CLOPIDOGREL BISULFATE 75 MILLIGRAM(S): 75 TABLET, FILM COATED ORAL at 11:42

## 2021-04-27 RX ADMIN — PANTOPRAZOLE SODIUM 40 MILLIGRAM(S): 20 TABLET, DELAYED RELEASE ORAL at 17:50

## 2021-04-27 RX ADMIN — PANTOPRAZOLE SODIUM 40 MILLIGRAM(S): 20 TABLET, DELAYED RELEASE ORAL at 05:29

## 2021-04-27 RX ADMIN — Medication 4: at 17:19

## 2021-04-27 RX ADMIN — HEPARIN SODIUM 5000 UNIT(S): 5000 INJECTION INTRAVENOUS; SUBCUTANEOUS at 21:13

## 2021-04-27 RX ADMIN — Medication 1 TABLET(S): at 11:42

## 2021-04-27 RX ADMIN — HEPARIN SODIUM 5000 UNIT(S): 5000 INJECTION INTRAVENOUS; SUBCUTANEOUS at 05:29

## 2021-04-27 RX ADMIN — Medication 2: at 07:59

## 2021-04-27 RX ADMIN — DULOXETINE HYDROCHLORIDE 60 MILLIGRAM(S): 30 CAPSULE, DELAYED RELEASE ORAL at 11:42

## 2021-04-27 RX ADMIN — SENNA PLUS 2 TABLET(S): 8.6 TABLET ORAL at 21:13

## 2021-04-27 RX ADMIN — Medication 240 MILLIGRAM(S): at 05:29

## 2021-04-27 RX ADMIN — Medication 6 MILLIGRAM(S): at 21:13

## 2021-04-27 RX ADMIN — TAMSULOSIN HYDROCHLORIDE 0.4 MILLIGRAM(S): 0.4 CAPSULE ORAL at 21:13

## 2021-04-27 NOTE — PROGRESS NOTE ADULT - ASSESSMENT
Pt is a 87 y/o M with PMHx of HTN, HLD, CAD, HFpEF, s/p Right CEA for Carotid artery disease, ESRD on HD 2d/week (M/Fri) via LUE fistula, s/p flecainide w/ ILR placed 6/2020, AS s/p TAVR, and PAD who presented to [facility] on 3/13/21 with GI bleed, as well as LLE pain, found to have occlusion of left superficial femoral artery. Pt underwent EGD and AVM cauterizations. He also underwent left fem-pop bypass x2 and was found to have LLE DVT, treated with heparin. He has functional deficits of with his ambulation and his endurance.      Comprehensive rehab program: -PT/OT/ST-total of 3 hrs/day 5 days/week     #PAD:- Continue Plavix 75mg  - s/p fem-pop bypass and revision  - Completed Keflex 500mg bid for possible superficial infection at admission    #DVT  - left femoral vein DVT  - on Heparin sq tid  - ?patient not a candidate for IVC filter  - repeat dopplers showed recanalization of left femoral vein, stable DVTs in more distal veins    #UGIB  - egd showed multiple AVMs, cauterized during EGD  - Protonix bid    #CKD  - on M/F dialysis with extra dialysis intermittently - has been getting Wednesday dialysis as well  - Renal consult  - Epoetin Yanick with dialysis  - labs with dialysis    #CHF  - continue Cardizem 240mg daily  - Torsemide d/c by renal  - 4/9   - CXR with Pleural effusion  - Imdur 30mg daily    #HTN  - Hydralazine d/c'd  - continue to monitor    #DM  - ISS  - FS has been controlled - FS bid     #Pain  - Cymbalta 60mg daily  - Tylenol PRN, modalities to left knee    #BPH  - continue Flomax   - Patient with some spontaneous void     #GI  - Miralax, senna    #Skin  - Bilateral heel pressure ulcers DTI: off-load heels, Elevate leg while in bed   - Sacrum and buttock decubitus ulcers: were unstageable, starting to heal  - left calf dressing change BID and PRN - medial with staples and lateral with sutures  - staples present at groin and lateral thigh   vascular surgery consult noted     #Diet  - Soft and bite sized  - Consistent Carb, DASH/TLC, Renal diet    Hospitalist and renal fu noted     #Dispo  Team meeting DATE: 4/27  Patient progressing well in therapy   EDOD: 4/30      Hospitalist and renal fu  noted.

## 2021-04-27 NOTE — PROGRESS NOTE ADULT - SUBJECTIVE AND OBJECTIVE BOX
HPI:  Pt is a 85 y/o M with PMHx of HTN, HLD, CAD, HFpEF, s/p Right CEA for Carotid artery disease, ESRD on HD 2d/week (M/Fri) via LUE fistula, s/p flecainide w/ ILR placed 6/2020, AS s/p TAVR, and PAD (RLE fem-pop bypass October 2020) who presented to [facility] on 3/13/21 with GI bleed, as well as LLE pain. 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 When patient was stable from GI, medical, cardiac standpoint, he went for LLE angiogram on 3/18, and found occlusion of L Superficial femoral artery. He was planned for a LLE fem-pop bypass, and had the bypass performed on 3/20. On 3/22, patient was a RRT for AMS, fever. Work-up revealed pleural effusions and L femoral DVT. He was started on abx for presumed pna and hep gtt for DVT. Patient began to show signs of malperfusion with coolness to touch, mottling of skin to LLE. He was planned for revision of LLE bypass. Went to OR on 3/25 for LLE bypass revision with explant of PTFE bypass, CFA to AT bypass with cryovein performed. Intra-op patient with 1500 ml of blood loss requiring 8 unit PRBC, 4 unit FFP, 1 donor unit plt.  During procedure, pt lost function of PPM with period of asystole requiring transcutaneous pacing. Cardiology consulted and pacemaker interrogated in the OR.  Pt taken to PACU post op and SICU consulted. Patient went to SICU post-op. On 3/27 patient was extubated and was downgraded from SICU to floor the next day. Rest of hospital course was uneventful. He worked with PT, was evaluated by PM&R and approved for acute rehab. The patient remained on heparin sq while in the hospital for tx of dvt (his plavix was held). Upon discharge, approved by CHALINO and Dr. Albrecht to resume plavix. He will remain on heparin sq while at rehab for dvt tx and ppx. He is not a candidate for IVC filter. Upon discharge, pain is well controlled, tolerating diet, remains HD stable, no clinical signs/symptoms of GI bleed, LLE remains warm and well perfused, he has a strong LLE palpable graft pulse and dopplerable signals. Per attending, he is medically stable for discharge to [facility] and on 4/8/21 he was transferred to [facility] for acute inpatient rehabilitation.    SUBJECTIVE / INTERVAL HPI: Patient seen and examined at bedside. He is feeling well, though a little tired from a busy day yesterday with therapy and dialysis. He continues to make slow progress. He denies pain, headache, shortness of breath, or nausea. He continues to be more alert and attentive.     REVIEW OF SYSTEMS:    CONSTITUTIONAL: No fevers or chills  EYES/ENT: No visual changes;  No vertigo or throat pain   NECK: No pain or stiffness  RESPIRATORY: No cough, wheezing, or shortness of breath  CARDIOVASCULAR: No chest pain or palpitations  GASTROINTESTINAL: No abdominal or epigastric pain. No nausea or vomiting. No diarrhea or constipation.   GENITOURINARY: No dysuria, frequency or hematuria  NEUROLOGICAL: No numbness or weakness  SKIN: No itching, rashes, leg incisions      VITAL SIGNS:  Vital Signs Last 24 Hrs  T(C): 37 (27 Apr 2021 07:54), Max: 37 (27 Apr 2021 07:54)  T(F): 98.6 (27 Apr 2021 07:54), Max: 98.6 (27 Apr 2021 07:54)  HR: 63 (27 Apr 2021 07:54) (61 - 69)  BP: 169/63 (27 Apr 2021 07:54) (146/60 - 169/63)  BP(mean): --  RR: 14 (27 Apr 2021 07:54) (14 - 14)  SpO2: 100% (27 Apr 2021 07:54) (97% - 100%)    PHYSICAL EXAM:    General: NAD, sitting up in wheelchair comfortably  HEENT: NC/AT; PERRL, anicteric sclera; MMM  Neck: supple  Cardiovascular: +S1/S2, RRR  Respiratory: CTA B/L; no W/R/R, no crackles  Gastrointestinal: soft, NT/ND  Extremities: warm, well perfused; left AV fistula intact, LLE swelling improved today, mild erythema by incisions - stable from prior days  Neurological: AAOx3; motor and sensory exams are grossly intact  Skin: Decubitus bilateral heel pressure ulcers DTI,    Left groin and lateral thigh incisions with staples are clean with minimal drainage. Left calf dressing with serosanguinous drainage, incisions: lateral (suture) and medial (staples).  Gangrenous toes on left foot.   Decubitus sacral and buttock ulcers present- unstageable- improving - now  stage 2 and healing well     MEDICATIONS:  MEDICATIONS  (STANDING):  clopidogrel Tablet 75 milliGRAM(s) Oral daily  dextrose 40% Gel 15 Gram(s) Oral once  dextrose 5%. 1000 milliLiter(s) (50 mL/Hr) IV Continuous <Continuous>  dextrose 5%. 1000 milliLiter(s) (100 mL/Hr) IV Continuous <Continuous>  dextrose 50% Injectable 25 Gram(s) IV Push once  dextrose 50% Injectable 12.5 Gram(s) IV Push once  dextrose 50% Injectable 25 Gram(s) IV Push once  diltiazem    milliGRAM(s) Oral daily  DULoxetine 60 milliGRAM(s) Oral daily  epoetin juan-epbx (RETACRIT) Injectable 40298 Unit(s) IV Push <User Schedule>  glucagon  Injectable 1 milliGRAM(s) IntraMuscular once  heparin   Injectable 5000 Unit(s) SubCutaneous every 8 hours  insulin lispro (ADMELOG) corrective regimen sliding scale   SubCutaneous two times a day with meals  isosorbide   mononitrate ER Tablet (IMDUR) 30 milliGRAM(s) Oral daily  melatonin 6 milliGRAM(s) Oral at bedtime  Nephro-pito 1 Tablet(s) Oral daily  pantoprazole    Tablet 40 milliGRAM(s) Oral two times a day  polyethylene glycol 3350 17 Gram(s) Oral daily  senna 2 Tablet(s) Oral at bedtime  tamsulosin 0.4 milliGRAM(s) Oral at bedtime    MEDICATIONS  (PRN):  acetaminophen   Tablet .. 650 milliGRAM(s) Oral every 6 hours PRN Temp greater or equal to 38C (100.4F), Mild Pain (1 - 3)  artificial  tears Solution 1 Drop(s) Both EYES two times a day PRN Dry Eyes      ALLERGIES:  Allergies    Plavix (Hives)  Toprol-XL (Rash)    Intolerances        LABS:                        10.6   6.14  )-----------( 212      ( 26 Apr 2021 17:00 )             33.0     04-26    135  |  99  |  65<H>  ----------------------------<  124<H>  4.8   |  27  |  5.54<H>    Ca    9.0      26 Apr 2021 17:00  Phos  4.1     04-26          CAPILLARY BLOOD GLUCOSE      POCT Blood Glucose.: 157 mg/dL (27 Apr 2021 07:57)      RADIOLOGY & ADDITIONAL TESTS: Reviewed.

## 2021-04-27 NOTE — PROGRESS NOTE ADULT - ATTENDING COMMENTS
Patient seen at bedside  Seated in chair  Denies any new issues.  Pain in LLE improved compared to admission, but has some with activity.   Had LLE doppler yesterday- results noted.     Progress reviewed at team conference today. Continues to need min assist with most ADLs, transfers, walking with RW, total assistance with toileting - ? unclear due to patient attempting to do task quickly and impaired balance  versus true need of assistance. Stairs - mod assist  Family training today  Discussed current functional status with daughter Vanessa over phone

## 2021-04-27 NOTE — PROGRESS NOTE ADULT - SUBJECTIVE AND OBJECTIVE BOX
Resting    Vital Signs Last 24 Hrs  T(C): 37 (04-27-21 @ 07:54), Max: 37 (04-27-21 @ 07:54)  T(F): 98.6 (04-27-21 @ 07:54), Max: 98.6 (04-27-21 @ 07:54)  HR: 63 (04-27-21 @ 07:54) (61 - 63)  BP: 169/63 (04-27-21 @ 07:54) (163/62 - 169/63)  RR: 14 (04-27-21 @ 07:54) (14 - 14)  SpO2: 100% (04-27-21 @ 07:54) (100% - 100%)    s1s2  b/l air entry  soft  tr edema b/l BARBRA KIM AVF patent                                                                                                                          10.6   6.14  )-----------( 212      ( 26 Apr 2021 17:00 )             33.0     26 Apr 2021 17:00    135    |  99     |  65     ----------------------------<  124    4.8     |  27     |  5.54     Ca    9.0        26 Apr 2021 17:00  Phos  4.1       26 Apr 2021 17:00    acetaminophen   Tablet .. 650 milliGRAM(s) Oral every 6 hours PRN  artificial  tears Solution 1 Drop(s) Both EYES two times a day PRN  clopidogrel Tablet 75 milliGRAM(s) Oral daily  dextrose 40% Gel 15 Gram(s) Oral once  dextrose 5%. 1000 milliLiter(s) IV Continuous <Continuous>  dextrose 5%. 1000 milliLiter(s) IV Continuous <Continuous>  dextrose 50% Injectable 25 Gram(s) IV Push once  dextrose 50% Injectable 12.5 Gram(s) IV Push once  dextrose 50% Injectable 25 Gram(s) IV Push once  diltiazem    milliGRAM(s) Oral daily  DULoxetine 60 milliGRAM(s) Oral daily  epoetin juan-epbx (RETACRIT) Injectable 08212 Unit(s) IV Push <User Schedule>  glucagon  Injectable 1 milliGRAM(s) IntraMuscular once  heparin   Injectable 5000 Unit(s) SubCutaneous every 8 hours  insulin lispro (ADMELOG) corrective regimen sliding scale   SubCutaneous two times a day with meals  isosorbide   mononitrate ER Tablet (IMDUR) 30 milliGRAM(s) Oral daily  melatonin 6 milliGRAM(s) Oral at bedtime  Nephro-pito 1 Tablet(s) Oral daily  pantoprazole    Tablet 40 milliGRAM(s) Oral two times a day  polyethylene glycol 3350 17 Gram(s) Oral daily  senna 2 Tablet(s) Oral at bedtime  tamsulosin 0.4 milliGRAM(s) Oral at bedtime    A/P:    S/p complicated hospital course as per HPI  ESRD on HD  HD MWF  TMP 2kg w/HD as able  Epogen  Renal diet  Rehab    [phone]

## 2021-04-27 NOTE — PROGRESS NOTE ADULT - SUBJECTIVE AND OBJECTIVE BOX
86y old  Male s/p GIB, s/p fem-pop bypass and revision    Patient seen and examined at bedside. c/o left LE swelling   Denies chest pain, dyspnea, abd pain.    Vital Signs Last 24 Hrs  T(C): 37 (27 Apr 2021 07:54), Max: 37 (27 Apr 2021 07:54)  T(F): 98.6 (27 Apr 2021 07:54), Max: 98.6 (27 Apr 2021 07:54)  HR: 63 (27 Apr 2021 07:54) (61 - 69)  BP: 169/63 (27 Apr 2021 07:54) (146/60 - 169/63)  BP(mean): --  RR: 14 (27 Apr 2021 07:54) (14 - 14)  SpO2: 100% (27 Apr 2021 07:54) (97% - 100%)    GENERAL- NAD  EAR/NOSE/MOUTH/THROAT - no pharyngeal exudates, no oral lesions, MMM  EYES- DAISY, conjunctiva and Sclera clear  NECK- supple  RESPIRATORY-  clear to auscultation bilaterally  CARDIOVASCULAR - SIS2, RRR  GI - soft NT BS present  EXTREMITIES- LLE edema  SKIN- LLE wounds dressing+  NEUROLOGY- no gross focal deficits  PSYCHIATRY- AAO X 3                    10.6                 135  | 27   | 65           6.14  >-----------< 212     ------------------------< 124                   33.0                 4.8  | 99   | 5.54                                         Ca 9.0   Mg x     Ph 4.1        MEDICATIONS  (STANDING):  clopidogrel Tablet 75 milliGRAM(s) Oral daily  diltiazem    milliGRAM(s) Oral daily  DULoxetine 60 milliGRAM(s) Oral daily  epoetin juan-epbx (RETACRIT) Injectable 46544 Unit(s) IV Push <User Schedule>  glucagon  Injectable 1 milliGRAM(s) IntraMuscular once  heparin   Injectable 5000 Unit(s) SubCutaneous every 8 hours  insulin lispro (ADMELOG) corrective regimen sliding scale   SubCutaneous two times a day with meals  isosorbide   mononitrate ER Tablet (IMDUR) 30 milliGRAM(s) Oral daily  melatonin 6 milliGRAM(s) Oral at bedtime  Nephro-pito 1 Tablet(s) Oral daily  pantoprazole    Tablet 40 milliGRAM(s) Oral two times a day  polyethylene glycol 3350 17 Gram(s) Oral daily  senna 2 Tablet(s) Oral at bedtime  tamsulosin 0.4 milliGRAM(s) Oral at bedtime

## 2021-04-27 NOTE — PROGRESS NOTE ADULT - ASSESSMENT
87 y/o M with extensive PMH including HTN, HLD, CAD, HFpEF, s/p Right CEA for Carotid artery disease, ESRD on HD 2d/week (M/Fri) via LUE fistula, s/p flecainide w/ ILR placed 6/2020, AS s/p TAVR, and PAD (RLE fem-pop bypass October 2020) who presented to [facility] on 3/13/21 with GI bleed, as well as LLE pain due to occlusion of L superficial fem artery.  He had a complicated hospital course and also had acute DVT, PNA, bilateral pleural effusions.  Pt apparently was not a candidate for an IVC filter, now admitted for rehab- pt/ot/dvt ppx  #ESRD on HD  -HD MWF  #PAD  -s/p fem-pop bypass and revision  -Continue Plavix    LLE edema- US Duplex Venous Lower Ext Ltd, Left (04.26.21 @ 15:35) >  Persistent deep vein thrombosis in the left popliteal vein, tibioperoneal trunk vein, and posterior tibial vein. Previously noted deep vein thrombosis in the left femoral vein has recanalized.  4.0 x 2.2 x 2.5 cm fluid collection in the left groin, decreased from prior measurement of 9.4 x 3.1 x 6.6 cm.  Enlarged left groin lymph node measuring 1.6 cm in short axis.    #Acute left femoral vein DVT  -Not on full dose AC due to GIB and high risk.   -DVT ppx with HSQ  -Per chart - patient not a candidate for IVC filter??  LLE edema- reassess after dialysis, check wound per wound care team, will monitor  < from: US Duplex Venous Lower Ext Complete, Bilateral (04.03.21 @ 13:54) >  Persistent thrombus in the left femoral, popliteal, tibial peroneal trunk and posterior tibial veins. No evidence of extension into the left common femoral vein.      #Acute blood loss anemia, stable, likely due to UGIB  -EGD showed multiple AVMs, cauterized during EGD  -Protonix 40mg po bid  -Monitor Hg/Hct, transfuse if Hg <8  -EPO with dialysis     #Chronic diastolic CHF, stable  #Bioprosthetic aortic valve  -TTE done 3/14/2021; reviewed reading  -AV normally functioning  -continue Cardizem 240mg daily    #HTN  -Continue Hydralazine   -C/w Imdur   -Continue Cardizem  -Monitor vitals    #DM-II, A1c 5.7 3/26  - ISS  - fingersticks q ac and hs  - A1C with Estimated Average Glucose Result: 5.7 % (03.26.21 @ 02:38)  - diet controlled    #BPH  -continue Flomax     will follow  d/w dr. desai

## 2021-04-28 LAB
ALBUMIN SERPL ELPH-MCNC: 2.4 G/DL — LOW (ref 3.3–5)
ALP SERPL-CCNC: 121 U/L — HIGH (ref 40–120)
ALT FLD-CCNC: 15 U/L — SIGNIFICANT CHANGE UP (ref 10–45)
ANION GAP SERPL CALC-SCNC: 9 MMOL/L — SIGNIFICANT CHANGE UP (ref 5–17)
AST SERPL-CCNC: 18 U/L — SIGNIFICANT CHANGE UP (ref 10–40)
BILIRUB SERPL-MCNC: 0.6 MG/DL — SIGNIFICANT CHANGE UP (ref 0.2–1.2)
BUN SERPL-MCNC: 60 MG/DL — HIGH (ref 7–23)
CALCIUM SERPL-MCNC: 9.3 MG/DL — SIGNIFICANT CHANGE UP (ref 8.4–10.5)
CHLORIDE SERPL-SCNC: 99 MMOL/L — SIGNIFICANT CHANGE UP (ref 96–108)
CO2 SERPL-SCNC: 27 MMOL/L — SIGNIFICANT CHANGE UP (ref 22–31)
CREAT SERPL-MCNC: 4.72 MG/DL — HIGH (ref 0.5–1.3)
GLUCOSE BLDC GLUCOMTR-MCNC: 105 MG/DL — HIGH (ref 70–99)
GLUCOSE BLDC GLUCOMTR-MCNC: 106 MG/DL — HIGH (ref 70–99)
GLUCOSE SERPL-MCNC: 159 MG/DL — HIGH (ref 70–99)
HCT VFR BLD CALC: 31.1 % — LOW (ref 39–50)
HGB BLD-MCNC: 10.1 G/DL — LOW (ref 13–17)
MCHC RBC-ENTMCNC: 31.6 PG — SIGNIFICANT CHANGE UP (ref 27–34)
MCHC RBC-ENTMCNC: 32.5 GM/DL — SIGNIFICANT CHANGE UP (ref 32–36)
MCV RBC AUTO: 97.2 FL — SIGNIFICANT CHANGE UP (ref 80–100)
NRBC # BLD: 0 /100 WBCS — SIGNIFICANT CHANGE UP (ref 0–0)
PHOSPHATE SERPL-MCNC: 3.1 MG/DL — SIGNIFICANT CHANGE UP (ref 2.5–4.5)
PLATELET # BLD AUTO: 199 K/UL — SIGNIFICANT CHANGE UP (ref 150–400)
POTASSIUM SERPL-MCNC: 4.4 MMOL/L — SIGNIFICANT CHANGE UP (ref 3.5–5.3)
POTASSIUM SERPL-SCNC: 4.4 MMOL/L — SIGNIFICANT CHANGE UP (ref 3.5–5.3)
PROT SERPL-MCNC: 6.4 G/DL — SIGNIFICANT CHANGE UP (ref 6–8.3)
RBC # BLD: 3.2 M/UL — LOW (ref 4.2–5.8)
RBC # FLD: 18.6 % — HIGH (ref 10.3–14.5)
SARS-COV-2 RNA SPEC QL NAA+PROBE: SIGNIFICANT CHANGE UP
SARS-COV-2 RNA SPEC QL NAA+PROBE: SIGNIFICANT CHANGE UP
SODIUM SERPL-SCNC: 135 MMOL/L — SIGNIFICANT CHANGE UP (ref 135–145)
WBC # BLD: 6.14 K/UL — SIGNIFICANT CHANGE UP (ref 3.8–10.5)
WBC # FLD AUTO: 6.14 K/UL — SIGNIFICANT CHANGE UP (ref 3.8–10.5)

## 2021-04-28 PROCEDURE — 99232 SBSQ HOSP IP/OBS MODERATE 35: CPT

## 2021-04-28 RX ADMIN — SENNA PLUS 2 TABLET(S): 8.6 TABLET ORAL at 21:21

## 2021-04-28 RX ADMIN — TAMSULOSIN HYDROCHLORIDE 0.4 MILLIGRAM(S): 0.4 CAPSULE ORAL at 21:21

## 2021-04-28 RX ADMIN — CLOPIDOGREL BISULFATE 75 MILLIGRAM(S): 75 TABLET, FILM COATED ORAL at 12:38

## 2021-04-28 RX ADMIN — DULOXETINE HYDROCHLORIDE 60 MILLIGRAM(S): 30 CAPSULE, DELAYED RELEASE ORAL at 12:38

## 2021-04-28 RX ADMIN — PANTOPRAZOLE SODIUM 40 MILLIGRAM(S): 20 TABLET, DELAYED RELEASE ORAL at 05:17

## 2021-04-28 RX ADMIN — HEPARIN SODIUM 5000 UNIT(S): 5000 INJECTION INTRAVENOUS; SUBCUTANEOUS at 05:17

## 2021-04-28 RX ADMIN — HEPARIN SODIUM 5000 UNIT(S): 5000 INJECTION INTRAVENOUS; SUBCUTANEOUS at 21:21

## 2021-04-28 RX ADMIN — PANTOPRAZOLE SODIUM 40 MILLIGRAM(S): 20 TABLET, DELAYED RELEASE ORAL at 18:34

## 2021-04-28 RX ADMIN — ISOSORBIDE MONONITRATE 30 MILLIGRAM(S): 60 TABLET, EXTENDED RELEASE ORAL at 12:38

## 2021-04-28 RX ADMIN — Medication 6 MILLIGRAM(S): at 21:21

## 2021-04-28 RX ADMIN — Medication 1 TABLET(S): at 12:38

## 2021-04-28 RX ADMIN — Medication 240 MILLIGRAM(S): at 05:17

## 2021-04-28 RX ADMIN — HEPARIN SODIUM 5000 UNIT(S): 5000 INJECTION INTRAVENOUS; SUBCUTANEOUS at 14:21

## 2021-04-28 NOTE — PROGRESS NOTE ADULT - SUBJECTIVE AND OBJECTIVE BOX
HPI:  Pt is a 87 y/o M with PMHx of HTN, HLD, CAD, HFpEF, s/p Right CEA for Carotid artery disease, ESRD on HD 2d/week (M/Fri) via LUE fistula, s/p flecainide w/ ILR placed 6/2020, AS s/p TAVR, and PAD (RLE fem-pop bypass October 2020) who presented to [facility] on 3/13/21 with GI bleed, as well as LLE pain. 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 When patient was stable from GI, medical, cardiac standpoint, he went for LLE angiogram on 3/18, and found occlusion of L Superficial femoral artery. He was planned for a LLE fem-pop bypass, and had the bypass performed on 3/20. On 3/22, patient was a RRT for AMS, fever. Work-up revealed pleural effusions and L femoral DVT. He was started on abx for presumed pna and hep gtt for DVT. Patient began to show signs of malperfusion with coolness to touch, mottling of skin to LLE. He was planned for revision of LLE bypass. Went to OR on 3/25 for LLE bypass revision with explant of PTFE bypass, CFA to AT bypass with cryovein performed. Intra-op patient with 1500 ml of blood loss requiring 8 unit PRBC, 4 unit FFP, 1 donor unit plt.  During procedure, pt lost function of PPM with period of asystole requiring transcutaneous pacing. Cardiology consulted and pacemaker interrogated in the OR.  Pt taken to PACU post op and SICU consulted. Patient went to SICU post-op. On 3/27 patient was extubated and was downgraded from SICU to floor the next day. Rest of hospital course was uneventful. He worked with PT, was evaluated by PM&R and approved for acute rehab. The patient remained on heparin sq while in the hospital for tx of dvt (his plavix was held). Upon discharge, approved by CHALINO and Dr. Albrecht to resume plavix. He will remain on heparin sq while at rehab for dvt tx and ppx. He is not a candidate for IVC filter. Upon discharge, pain is well controlled, tolerating diet, remains HD stable, no clinical signs/symptoms of GI bleed, LLE remains warm and well perfused, he has a strong LLE palpable graft pulse and dopplerable signals. Per attending, he is medically stable for discharge to AR and on 4/8/21 he was transferred to [facility] for acute inpatient rehabilitation.    SUBJECTIVE / INTERVAL HPI: Patient seen and examined at bedside.   Seated in chair  He feels well and happy with his progress  Pain in LLE improving slowly       REVIEW OF SYSTEMS:    CONSTITUTIONAL: No fevers or chills  EYES/ENT: No visual changes;  No vertigo or throat pain   NECK: No pain or stiffness  RESPIRATORY: No cough, wheezing, or shortness of breath  CARDIOVASCULAR: No chest pain or palpitations  GASTROINTESTINAL: No abdominal or epigastric pain.   GENITOURINARY: No dysuria, frequency or hematuria    Vital Signs Last 24 Hrs  T(C): 36.8 (28 Apr 2021 08:29), Max: 37.1 (27 Apr 2021 21:11)  T(F): 98.2 (28 Apr 2021 08:29), Max: 98.8 (27 Apr 2021 21:11)  HR: 68 (28 Apr 2021 08:29) (62 - 80)  BP: 148/74 (28 Apr 2021 08:29) (148/74 - 169/68)  BP(mean): --  RR: 14 (28 Apr 2021 08:29) (14 - 16)  SpO2: 97% (28 Apr 2021 08:29) (97% - 99%)            PHYSICAL EXAM:    General: NAD, sitting up in wheelchair comfortably  Cardiovascular: +S1/S2  Respiratory: CTA B/L;   Gastrointestinal: soft, NT/ND  Extremities: warm, well perfused; left AV fistula +  LLE swelling improved today, mild erythema by incisions - stable from prior days  Skin: Decubitus bilateral heel pressure ulcers DTI,    Left groin and lateral thigh incisions with staples are clean with minimal drainage. Left calf dressing with serosanguinous drainage, incisions: lateral (suture) and medial (staples).  Gangrenous toes on left foot.   Decubitus sacral and buttock ulcers present- unstageable- improving - now  stage 2 and healing well       MEDICATIONS  (STANDING):  clopidogrel Tablet 75 milliGRAM(s) Oral daily  dextrose 40% Gel 15 Gram(s) Oral once  dextrose 5%. 1000 milliLiter(s) (50 mL/Hr) IV Continuous <Continuous>  dextrose 5%. 1000 milliLiter(s) (100 mL/Hr) IV Continuous <Continuous>  dextrose 50% Injectable 25 Gram(s) IV Push once  dextrose 50% Injectable 12.5 Gram(s) IV Push once  dextrose 50% Injectable 25 Gram(s) IV Push once  diltiazem    milliGRAM(s) Oral daily  DULoxetine 60 milliGRAM(s) Oral daily  epoetin juan-epbx (RETACRIT) Injectable 86519 Unit(s) IV Push <User Schedule>  glucagon  Injectable 1 milliGRAM(s) IntraMuscular once  heparin   Injectable 5000 Unit(s) SubCutaneous every 8 hours  insulin lispro (ADMELOG) corrective regimen sliding scale   SubCutaneous two times a day with meals  isosorbide   mononitrate ER Tablet (IMDUR) 30 milliGRAM(s) Oral daily  melatonin 6 milliGRAM(s) Oral at bedtime  Nephro-pito 1 Tablet(s) Oral daily  pantoprazole    Tablet 40 milliGRAM(s) Oral two times a day  polyethylene glycol 3350 17 Gram(s) Oral daily  senna 2 Tablet(s) Oral at bedtime  tamsulosin 0.4 milliGRAM(s) Oral at bedtime    MEDICATIONS  (PRN):  acetaminophen   Tablet .. 650 milliGRAM(s) Oral every 6 hours PRN Temp greater or equal to 38C (100.4F), Mild Pain (1 - 3)  artificial  tears Solution 1 Drop(s) Both EYES two times a day PRN Dry Eyes      LABS:                        10.6   6.14  )-----------( 212      ( 26 Apr 2021 17:00 )             33.0     04-26    135  |  99  |  65<H>  ----------------------------<  124<H>  4.8   |  27  |  5.54<H>    Ca    9.0      26 Apr 2021 17:00  Phos  4.1     04-26          CAPILLARY BLOOD GLUCOSE      POCT Blood Glucose.: 157 mg/dL (27 Apr 2021 07:57)      RADIOLOGY & ADDITIONAL TESTS: Reviewed.

## 2021-04-28 NOTE — PROGRESS NOTE ADULT - SUBJECTIVE AND OBJECTIVE BOX
Resting    Vital Signs Last 24 Hrs  T(C): 36.6 (04-28-21 @ 18:10), Max: 37.1 (04-27-21 @ 21:11)  T(F): 97.8 (04-28-21 @ 18:10), Max: 98.8 (04-27-21 @ 21:11)  HR: 66 (04-28-21 @ 18:37) (62 - 80)  BP: 130/56 (04-28-21 @ 18:37) (126/52 - 169/68)  RR: 14 (04-28-21 @ 18:10) (14 - 16)  SpO2: 99% (04-28-21 @ 18:10) (97% - 99%)    s1s2  b/l air entry  soft  tr edema b/l LEBARBRA AVF patent                                                                                                                                  10.1   6.14  )-----------( 199      ( 28 Apr 2021 15:10 )             31.1     28 Apr 2021 15:10    135    |  99     |  60     ----------------------------<  159    4.4     |  27     |  4.72     Ca    9.3        28 Apr 2021 15:10  Phos  3.1       28 Apr 2021 15:10    TPro  6.4    /  Alb  2.4    /  TBili  0.6    /  DBili  x      /  AST  18     /  ALT  15     /  AlkPhos  121    28 Apr 2021 15:10    LIVER FUNCTIONS - ( 28 Apr 2021 15:10 )  Alb: 2.4 g/dL / Pro: 6.4 g/dL / ALK PHOS: 121 U/L / ALT: 15 U/L / AST: 18 U/L / GGT: x           acetaminophen   Tablet .. 650 milliGRAM(s) Oral every 6 hours PRN  artificial  tears Solution 1 Drop(s) Both EYES two times a day PRN  clopidogrel Tablet 75 milliGRAM(s) Oral daily  dextrose 40% Gel 15 Gram(s) Oral once  dextrose 5%. 1000 milliLiter(s) IV Continuous <Continuous>  dextrose 5%. 1000 milliLiter(s) IV Continuous <Continuous>  dextrose 50% Injectable 25 Gram(s) IV Push once  dextrose 50% Injectable 12.5 Gram(s) IV Push once  dextrose 50% Injectable 25 Gram(s) IV Push once  diltiazem    milliGRAM(s) Oral daily  DULoxetine 60 milliGRAM(s) Oral daily  epoetin juan-epbx (RETACRIT) Injectable 61439 Unit(s) IV Push <User Schedule>  glucagon  Injectable 1 milliGRAM(s) IntraMuscular once  heparin   Injectable 5000 Unit(s) SubCutaneous every 8 hours  insulin lispro (ADMELOG) corrective regimen sliding scale   SubCutaneous two times a day with meals  isosorbide   mononitrate ER Tablet (IMDUR) 30 milliGRAM(s) Oral daily  melatonin 6 milliGRAM(s) Oral at bedtime  Nephro-pito 1 Tablet(s) Oral daily  pantoprazole    Tablet 40 milliGRAM(s) Oral two times a day  polyethylene glycol 3350 17 Gram(s) Oral daily  senna 2 Tablet(s) Oral at bedtime  tamsulosin 0.4 milliGRAM(s) Oral at bedtime    A/P:    S/p complicated hospital course as per HPI  ESRD on HD  HD MWF  TMP 2kg w/HD as able  Epogen  Renal diet  Rehab    [phone]

## 2021-04-28 NOTE — PROGRESS NOTE ADULT - ASSESSMENT
85 y/o M with extensive PMH including HTN, HLD, CAD, HFpEF, s/p Right CEA for Carotid artery disease, ESRD on HD 2d/week (M/Fri) via LUE fistula, s/p flecainide w/ ILR placed 6/2020, AS s/p TAVR, and PAD (RLE fem-pop bypass October 2020) who presented to [facility] on 3/13/21 with GI bleed, as well as LLE pain due to occlusion of L superficial fem artery.  He had a complicated hospital course and also had acute DVT, PNA, bilateral pleural effusions.  Pt apparently was not a candidate for an IVC filter, now admitted for rehab- pt/ot/dvt ppx  #ESRD on HD  -HD MWF  - Renal on board  #PAD  -s/p fem-pop bypass and revision  -Continue Plavix    #Acute left femoral vein DVT  -Not on full dose AC due to GIB and high risk.   -DVT ppx with HSQ  -Per chart - patient not a candidate for IVC filter??  LLE edema- reassess after dialysis, check wound per wound care team, will monitor  US Duplex Venous Lower Ext Ltd, Left 4/26: Persistent deep vein thrombosis in the left popliteal vein, tibioperoneal trunk vein, and posterior tibial vein. Previously noted deep vein thrombosis in the left femoral vein has recanalized. 4.0 x 2.2 x 2.5 cm fluid collection in the left groin, decreased from prior measurement of 9.4 x 3.1 x 6.6 cm. Enlarged left groin lymph node measuring 1.6 cm in short axis.      #Acute blood loss anemia, stable, likely due to UGIB  -EGD showed multiple AVMs, cauterized during EGD  -Protonix 40mg po bid  -Monitor Hg/Hct, transfuse if Hg <8  -EPO with dialysis     #Chronic diastolic CHF, stable  #Bioprosthetic aortic valve  -TTE done 3/14/2021; Ef 60-65%. grad 2 DD  -AV normally functioning  -continue Cardizem 240mg daily    #HTN  - BP elevated.  - Hydralazine was dced by renal on 4/21  -C/w Imdur 30. can increase to 60 mg daily  -Continue Cardizem 240mg  -Monitor vitals    #DM-II, A1c 5.7 3/26  - ISS  - fingersticks q ac and hs  - A1C with Estimated Average Glucose Result: 5.7 % (03.26.21 @ 02:38)  - diet controlled    #BPH  -continue Flomax     # hypoalbuminemia  # moderate protein calorie malnutrition  - on nepro  - nutrition eval ongoing    will follow  d/w dr. desai

## 2021-04-28 NOTE — PROGRESS NOTE ADULT - ASSESSMENT
Pt is a 87 y/o M with PMHx of HTN, HLD, CAD, HFpEF, s/p Right CEA for Carotid artery disease, ESRD on HD 2d/week (M/Fri) via LUE fistula, s/p flecainide w/ ILR placed 6/2020, AS s/p TAVR, and PAD who presented to [facility] on 3/13/21 with GI bleed, as well as LLE pain, found to have occlusion of left superficial femoral artery. Pt underwent EGD and AVM cauterizations. He also underwent left fem-pop bypass x2 and was found to have LLE DVT, treated with heparin. He has functional deficits of with his ambulation and his endurance.      Comprehensive rehab program: -PT/OT/ST-total of 3 hrs/day 5 days/week     #PAD:- Continue Plavix 75mg  - s/p fem-pop bypass and revision  -     #DVT  - left femoral vein DVT  - on Heparin sq tid  - ?patient not a candidate for IVC filter  - repeat dopplers showed recanalization of left femoral vein, stable DVTs in more distal veins    #UGIB  - egd showed multiple AVMs, cauterized during EGD  - Protonix bid    #CKD:-- HD per renal   - Epoetin Yanick with dialysis  - labs with dialysis    #CHF  - continue Cardizem 240mg daily  - Torsemide d/c by renal  - 4/9   - CXR with Pleural effusion  - Imdur 30mg daily    #HTN:- Hydralazine d/c'd  - continue to monitor    #DM  - ISS  - FS has been controlled - FS bid     #Pain  - Cymbalta 60mg daily  - Tylenol PRN, modalities to left knee    #BPH  - continue Flomax   - Patient with some spontaneous void     #GI  - Miralax, senna    #Skin  - Bilateral heel pressure ulcers DTI: off-load heels, Elevate leg while in bed   - Sacrum and buttock decubitus ulcers: were unstageable, starting to heal  Incisions at left calf, left thigh, groin- staple-        #Diet  - Soft and bite sized  - Consistent Carb, DASH/TLC, Renal diet    Hospitalist and renal fu noted     #Dispo  Team meeting DATE: 4/27  Patient progressing well in therapy   EDOD: 4/30      Hospitalist and renal fu  noted.  Staples/sutures to be removed at surgical fu  Awaiting call back from vascular about continued heparin  Discussed elevated BP with renal, since hydralazine has been discontinued. Recommends continuing current meds and fu as outpatient with PCP/cardio

## 2021-04-28 NOTE — PROGRESS NOTE ADULT - SUBJECTIVE AND OBJECTIVE BOX
Medicine Progress Note    Patient is a 86y old  Male who presents with a chief complaint of Impairment group -13 - PAD (28 Apr 2021 11:54)      SUBJECTIVE / OVERNIGHT EVENTS:  seen and examined  Chart reviewed  No overnight events  Limb weakness improving with therapy  BM+  No pain      ADDITIONAL REVIEW OF SYSTEMS:  no fever/chills/CP/sob/palpitation/dizziness/ abd pain/nausea/vomiting/diarrhea/constipation/headaches. all other ROS neg    MEDICATIONS  (STANDING):  clopidogrel Tablet 75 milliGRAM(s) Oral daily  dextrose 40% Gel 15 Gram(s) Oral once  dextrose 5%. 1000 milliLiter(s) (50 mL/Hr) IV Continuous <Continuous>  dextrose 5%. 1000 milliLiter(s) (100 mL/Hr) IV Continuous <Continuous>  dextrose 50% Injectable 25 Gram(s) IV Push once  dextrose 50% Injectable 12.5 Gram(s) IV Push once  dextrose 50% Injectable 25 Gram(s) IV Push once  diltiazem    milliGRAM(s) Oral daily  DULoxetine 60 milliGRAM(s) Oral daily  epoetin juan-epbx (RETACRIT) Injectable 40985 Unit(s) IV Push <User Schedule>  glucagon  Injectable 1 milliGRAM(s) IntraMuscular once  heparin   Injectable 5000 Unit(s) SubCutaneous every 8 hours  insulin lispro (ADMELOG) corrective regimen sliding scale   SubCutaneous two times a day with meals  isosorbide   mononitrate ER Tablet (IMDUR) 30 milliGRAM(s) Oral daily  melatonin 6 milliGRAM(s) Oral at bedtime  Nephro-pito 1 Tablet(s) Oral daily  pantoprazole    Tablet 40 milliGRAM(s) Oral two times a day  polyethylene glycol 3350 17 Gram(s) Oral daily  senna 2 Tablet(s) Oral at bedtime  tamsulosin 0.4 milliGRAM(s) Oral at bedtime    MEDICATIONS  (PRN):  acetaminophen   Tablet .. 650 milliGRAM(s) Oral every 6 hours PRN Temp greater or equal to 38C (100.4F), Mild Pain (1 - 3)  artificial  tears Solution 1 Drop(s) Both EYES two times a day PRN Dry Eyes    CAPILLARY BLOOD GLUCOSE      POCT Blood Glucose.: 105 mg/dL (28 Apr 2021 07:38)  POCT Blood Glucose.: 213 mg/dL (27 Apr 2021 17:15)    I&O's Summary      PHYSICAL EXAM:  Vital Signs Last 24 Hrs  T(C): 36.8 (28 Apr 2021 08:29), Max: 37.1 (27 Apr 2021 21:11)  T(F): 98.2 (28 Apr 2021 08:29), Max: 98.8 (27 Apr 2021 21:11)  HR: 68 (28 Apr 2021 08:29) (62 - 80)  BP: 148/74 (28 Apr 2021 08:29) (148/74 - 169/68)  BP(mean): --  RR: 14 (28 Apr 2021 08:29) (14 - 16)  SpO2: 97% (28 Apr 2021 08:29) (97% - 99%)    GENERAL: Not in distress. Alert    HEENT:  MMM  NECK: Supple.    CARDIOVASCULAR: RRR S1, S2. No murmur/rubs/gallop  LUNGS: BLAE+, no rales, no wheezing, no rhonchi.    ABDOMEN: ND. Soft,  NT, no guarding / rebound / rigidity.   EXTREMITIES: no edema. no leg or calf TP.  SKIN: warm and dry. heel and sacral decub as per primary team description  PSYCHIATRIC: Calm.  No agitation.    LABS:                        10.6   6.14  )-----------( 212      ( 26 Apr 2021 17:00 )             33.0     04-26    135  |  99  |  65<H>  ----------------------------<  124<H>  4.8   |  27  |  5.54<H>    Ca    9.0      26 Apr 2021 17:00  Phos  4.1     04-26                COVID-19 PCR: NotDetec (22 Apr 2021 05:30)  COVID-19 PCR: NotDetec (19 Apr 2021 20:00)  COVID-19 PCR: NotDetec (15 Apr 2021 19:30)  COVID-19 PCR: NotDetec (10 Apr 2021 19:57)  COVID-19 PCR: NotDetec (07 Apr 2021 10:12)  COVID-19 PCR: NotDetec (03 Apr 2021 22:37)  COVID-19 PCR: NotDetec (28 Mar 2021 16:32)  COVID-19 PCR: NotDetec (24 Mar 2021 16:45)  COVID-19 PCR: NotDetec (19 Mar 2021 10:12)  COVID-19 PCR: NotDetec (16 Mar 2021 11:09)  COVID-19 PCR: NotDetec (13 Mar 2021 18:29)  SARS-CoV-2: NotDetec (12 Nov 2020 19:03)  SARS-CoV-2: NotDetec (12 Nov 2020 16:51)      RADIOLOGY & ADDITIONAL TESTS:  Imaging from Last 24 Hours:    Electrocardiogram/QTc Interval:    < from: TTE Echo Complete w/o Contrast w/ Doppler (03.14.21 @ 11:36) >  Summary:   1. Moderately enlarged left atrium.   2. There is mild concentric left ventricular hypertrophy.   3. Left ventricular ejection fraction, by visual estimation, is 60 to 65%.   4. Grade II diastolic dysfunction. Elevated mean left atrial pressure.   5. Mildly enlarged right atrium.   6. Mildly enlarged right ventricle. Mildly reduced RV systolic function.   7. Mild mitral stenosis. Moderate mitral valve regurgitation. Elevated mean gradient likely due to volume overload. Repeat study after diuresis to erevaluate mitral valve. If clinically indicated.   8. 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 Well seated valve. Acceptable gradients. No regurgitation.   9. Mild tricuspid regurgitation.  10. Estimated pulmonary artery systolic pressure is 78 mmHg - severe pulmonary hypertension.  11. There is no evidence of pericardial effusion.    < end of copied text >      COORDINATION OF CARE:  Care Discussed with Consultants/Other Providers:

## 2021-04-28 NOTE — CHART NOTE - NSCHARTNOTEFT_GEN_A_CORE
Nutrition Follow Up Note  Hospital Course   (Per Electronic Medical Record)    Source:  Patient [X]  Medical Record [X]      Diet:   Consistent Carbohydrate, Renal, DASH-TLC Soft (Dysphagia 3-Soft & Bite Sized) Diet w/ Thin Liquids  Tolerates Diet Consistency Well  No Chewing/Swallowing Difficulties  No Recent Nausea, Vomiting, Diarrhea or Constipation (as Per Patient)  Consumes % of Meals (as Per Documentation) - Intake Improving    on Nepro 8oz TID (Provides 1,275kcal & 57grams of Protein)     Enteral/Parenteral Nutrition: Not Applicable    Current Weight: 134.9lb on 4/28  Obtain Weights Daily  Weight Fluctuates     Pertinent Medications: MEDICATIONS  (STANDING):  clopidogrel Tablet 75 milliGRAM(s) Oral daily  dextrose 40% Gel 15 Gram(s) Oral once  dextrose 5%. 1000 milliLiter(s) (50 mL/Hr) IV Continuous <Continuous>  dextrose 5%. 1000 milliLiter(s) (100 mL/Hr) IV Continuous <Continuous>  dextrose 50% Injectable 25 Gram(s) IV Push once  dextrose 50% Injectable 12.5 Gram(s) IV Push once  dextrose 50% Injectable 25 Gram(s) IV Push once  diltiazem    milliGRAM(s) Oral daily  DULoxetine 60 milliGRAM(s) Oral daily  epoetin juan-epbx (RETACRIT) Injectable 35470 Unit(s) IV Push <User Schedule>  glucagon  Injectable 1 milliGRAM(s) IntraMuscular once  heparin   Injectable 5000 Unit(s) SubCutaneous every 8 hours  insulin lispro (ADMELOG) corrective regimen sliding scale   SubCutaneous two times a day with meals  isosorbide   mononitrate ER Tablet (IMDUR) 30 milliGRAM(s) Oral daily  melatonin 6 milliGRAM(s) Oral at bedtime  Nephro-pito 1 Tablet(s) Oral daily  pantoprazole    Tablet 40 milliGRAM(s) Oral two times a day  polyethylene glycol 3350 17 Gram(s) Oral daily  senna 2 Tablet(s) Oral at bedtime  tamsulosin 0.4 milliGRAM(s) Oral at bedtime    MEDICATIONS  (PRN):  acetaminophen   Tablet .. 650 milliGRAM(s) Oral every 6 hours PRN Temp greater or equal to 38C (100.4F), Mild Pain (1 - 3)  artificial  tears Solution 1 Drop(s) Both EYES two times a day PRN Dry Eyes    Pertinent Labs:  04-26 Na135 mmol/L Glu 124 mg/dL<H> K+ 4.8 mmol/L Cr  5.54 mg/dL<H> BUN 65 mg/dL<H> 04-26 Phos 4.1 mg/dL 04-23 Alb 2.5 g/dL<L>    Skin: Multiple Pressure Ulcers  Multiple Surgical Incisions  (as Per Nursing Flow Sheet)     Edema: None Noted Recently     Last Bowel Movement: on 4/26    Estimated Needs:   [X] No Change Since Previous Assessment    Previous Nutrition Diagnosis:   Moderate Malnutrition     Nutrition Diagnosis is [X] Ongoing - Continues on Nutrition Supplement     New Nutrition Diagnosis: [X] Not Applicable    Interventions:   1. Recommend Continue Nutrition Plan of Care     Monitoring & Evaluation:   [X] Weights   [X] PO Intake   [X] Skin Integrity   [X] Follow Up (Per Protocol)  [X] Tolerance to Diet Prescription   [X] Other: Labs & POCT    Registered Dietitian/Nutritionist Remains Available.  Juno Carbajal RDN    Pager # [phone]  Phone# [phone]

## 2021-04-29 ENCOUNTER — TRANSCRIPTION ENCOUNTER (OUTPATIENT)
Age: 86
End: 2021-04-29

## 2021-04-29 LAB
GLUCOSE BLDC GLUCOMTR-MCNC: 102 MG/DL — HIGH (ref 70–99)
GLUCOSE BLDC GLUCOMTR-MCNC: 206 MG/DL — HIGH (ref 70–99)

## 2021-04-29 PROCEDURE — 99232 SBSQ HOSP IP/OBS MODERATE 35: CPT | Mod: GC

## 2021-04-29 PROCEDURE — 99232 SBSQ HOSP IP/OBS MODERATE 35: CPT

## 2021-04-29 RX ORDER — TAMSULOSIN HYDROCHLORIDE 0.4 MG/1
1 CAPSULE ORAL
Qty: 30 | Refills: 0
Start: 2021-04-29 | End: 2021-05-28

## 2021-04-29 RX ORDER — HEPARIN SODIUM 5000 [USP'U]/ML
5000 INJECTION INTRAVENOUS; SUBCUTANEOUS
Qty: 90 | Refills: 0
Start: 2021-04-29 | End: 2021-05-28

## 2021-04-29 RX ORDER — CLOPIDOGREL BISULFATE 75 MG/1
1 TABLET, FILM COATED ORAL
Qty: 30 | Refills: 0
Start: 2021-04-29 | End: 2021-05-28

## 2021-04-29 RX ORDER — HYDRALAZINE HCL 50 MG
2 TABLET ORAL
Qty: 0 | Refills: 0 | DISCHARGE

## 2021-04-29 RX ORDER — DILTIAZEM HCL 120 MG
1 CAPSULE, EXT RELEASE 24 HR ORAL
Qty: 30 | Refills: 0
Start: 2021-04-29 | End: 2021-05-28

## 2021-04-29 RX ORDER — ISOSORBIDE MONONITRATE 60 MG/1
1 TABLET, EXTENDED RELEASE ORAL
Qty: 30 | Refills: 0
Start: 2021-04-29 | End: 2021-05-28

## 2021-04-29 RX ORDER — CLOPIDOGREL BISULFATE 75 MG/1
1 TABLET, FILM COATED ORAL
Qty: 0 | Refills: 0 | DISCHARGE

## 2021-04-29 RX ORDER — DULOXETINE HYDROCHLORIDE 30 MG/1
1 CAPSULE, DELAYED RELEASE ORAL
Qty: 30 | Refills: 0
Start: 2021-04-29 | End: 2021-05-28

## 2021-04-29 RX ORDER — PANTOPRAZOLE SODIUM 20 MG/1
1 TABLET, DELAYED RELEASE ORAL
Qty: 60 | Refills: 0
Start: 2021-04-29 | End: 2021-05-28

## 2021-04-29 RX ORDER — DULOXETINE HYDROCHLORIDE 30 MG/1
1 CAPSULE, DELAYED RELEASE ORAL
Qty: 0 | Refills: 0 | DISCHARGE

## 2021-04-29 RX ADMIN — HEPARIN SODIUM 5000 UNIT(S): 5000 INJECTION INTRAVENOUS; SUBCUTANEOUS at 05:31

## 2021-04-29 RX ADMIN — DULOXETINE HYDROCHLORIDE 60 MILLIGRAM(S): 30 CAPSULE, DELAYED RELEASE ORAL at 12:50

## 2021-04-29 RX ADMIN — SENNA PLUS 2 TABLET(S): 8.6 TABLET ORAL at 21:20

## 2021-04-29 RX ADMIN — HEPARIN SODIUM 5000 UNIT(S): 5000 INJECTION INTRAVENOUS; SUBCUTANEOUS at 21:20

## 2021-04-29 RX ADMIN — HEPARIN SODIUM 5000 UNIT(S): 5000 INJECTION INTRAVENOUS; SUBCUTANEOUS at 14:14

## 2021-04-29 RX ADMIN — Medication 1 TABLET(S): at 12:49

## 2021-04-29 RX ADMIN — ISOSORBIDE MONONITRATE 30 MILLIGRAM(S): 60 TABLET, EXTENDED RELEASE ORAL at 12:50

## 2021-04-29 RX ADMIN — Medication 240 MILLIGRAM(S): at 05:30

## 2021-04-29 RX ADMIN — PANTOPRAZOLE SODIUM 40 MILLIGRAM(S): 20 TABLET, DELAYED RELEASE ORAL at 05:30

## 2021-04-29 RX ADMIN — Medication 4: at 17:24

## 2021-04-29 RX ADMIN — Medication 6 MILLIGRAM(S): at 21:20

## 2021-04-29 RX ADMIN — CLOPIDOGREL BISULFATE 75 MILLIGRAM(S): 75 TABLET, FILM COATED ORAL at 12:50

## 2021-04-29 RX ADMIN — POLYETHYLENE GLYCOL 3350 17 GRAM(S): 17 POWDER, FOR SOLUTION ORAL at 12:50

## 2021-04-29 RX ADMIN — PANTOPRAZOLE SODIUM 40 MILLIGRAM(S): 20 TABLET, DELAYED RELEASE ORAL at 21:20

## 2021-04-29 RX ADMIN — TAMSULOSIN HYDROCHLORIDE 0.4 MILLIGRAM(S): 0.4 CAPSULE ORAL at 21:20

## 2021-04-29 NOTE — PROGRESS NOTE ADULT - SUBJECTIVE AND OBJECTIVE BOX
86y old  Male s/p GIB, s/p fem-pop bypass and revision    Patient seen and examined at bedside. no new complaints, Denies chest pain, dyspnea, abd pain.      Vital Signs Last 24 Hrs  T(C): 37.1 (28 Apr 2021 20:06), Max: 37.1 (28 Apr 2021 20:06)  T(F): 98.8 (28 Apr 2021 20:06), Max: 98.8 (28 Apr 2021 20:06)  HR: 68 (28 Apr 2021 20:06) (63 - 68)  BP: 135/47 (28 Apr 2021 20:06) (126/52 - 155/57)  BP(mean): --  RR: 15 (28 Apr 2021 20:06) (14 - 15)  SpO2: 99% (28 Apr 2021 20:06) (99% - 99%)      GENERAL- NAD  EAR/NOSE/MOUTH/THROAT - no pharyngeal exudates, no oral lesions, MMM  EYES- DAISY, conjunctiva and Sclera clear  NECK- supple  RESPIRATORY-  clear to auscultation bilaterally  CARDIOVASCULAR - SIS2, RRR  GI - soft NT BS present  EXTREMITIES- LLE edema  SKIN- LLE wounds dressing+  NEUROLOGY- no gross focal deficits  PSYCHIATRY- AAO X 3                  10.1                 135  | 27   | 60           6.14  >-----------< 199     ------------------------< 159                   31.1                 4.4  | 99   | 4.72                                         Ca 9.3   Mg x     Ph 3.1    CAPILLARY BLOOD GLUCOSE      POCT Blood Glucose.: 102 mg/dL (29 Apr 2021 07:13)  POCT Blood Glucose.: 106 mg/dL (28 Apr 2021 18:33)      MEDICATIONS  (STANDING):  clopidogrel Tablet 75 milliGRAM(s) Oral daily  diltiazem   milliGRAM(s) Oral daily  DULoxetine 60 milliGRAM(s) Oral daily  epoetin juan-epbx (RETACRIT) Injectable 92575 Unit(s) IV Push <User Schedule>  glucagon  Injectable 1 milliGRAM(s) IntraMuscular once  heparin   Injectable 5000 Unit(s) SubCutaneous every 8 hours  insulin lispro (ADMELOG) corrective regimen sliding scale   SubCutaneous two times a day with meals  isosorbide mononitrate ER Tablet (IMDUR) 30 milliGRAM(s) Oral daily  melatonin 6 milliGRAM(s) Oral at bedtime  Nephro-pito 1 Tablet(s) Oral daily  pantoprazole    Tablet 40 milliGRAM(s) Oral two times a day  polyethylene glycol 3350 17 Gram(s) Oral daily  senna 2 Tablet(s) Oral at bedtime  tamsulosin 0.4 milliGRAM(s) Oral at bedtime    MEDICATIONS  (PRN):  acetaminophen   Tablet .. 650 milliGRAM(s) Oral every 6 hours PRN Temp greater or equal to 38C (100.4F), Mild Pain (1 - 3)  artificial  tears Solution 1 Drop(s) Both EYES two times a day PRN Dry Eyes

## 2021-04-29 NOTE — PROGRESS NOTE ADULT - ASSESSMENT
85 y/o M with extensive PMH including HTN, HLD, CAD, HFpEF, s/p Right CEA for Carotid artery disease, ESRD on HD 2d/week (M/Fri) via LUE fistula, s/p flecainide w/ ILR placed 6/2020, AS s/p TAVR, and PAD (RLE fem-pop bypass October 2020) who presented to [facility] on 3/13/21 with GI bleed, as well as LLE pain due to occlusion of L superficial fem artery.  He had a complicated hospital course and also had acute DVT, PNA, bilateral pleural effusions.  Pt apparently was not a candidate for an IVC filter, now admitted for rehab- pt/ot/dvt ppx  #ESRD on HD  -HD MWF  #PAD  -s/p fem-pop bypass and revision  -Continue Plavix    LLE edema- US Duplex Venous Lower Ext Ltd, Left (04.26.21 @ 15:35) >  Persistent deep vein thrombosis in the left popliteal vein, tibioperoneal trunk vein, and posterior tibial vein. Previously noted deep vein thrombosis in the left femoral vein has recanalized.  4.0 x 2.2 x 2.5 cm fluid collection in the left groin, decreased from prior measurement of 9.4 x 3.1 x 6.6 cm.  Enlarged left groin lymph node measuring 1.6 cm in short axis.    #Acute left femoral vein DVT  -Not on full dose AC due to GIB and high risk.   -DVT ppx with HSQ  -Per chart - patient not a candidate for IVC filter??  - LLE edema- reassess after dialysis, check wound per wound care team, will monitor  < from: US Duplex Venous Lower Ext Complete, Bilateral (04.03.21 @ 13:54) >  Persistent thrombus in the left femoral, popliteal, tibial peroneal trunk and posterior tibial veins. No evidence of extension into the left common femoral vein.      #Acute blood loss anemia, stable, likely due to UGIB  -EGD showed multiple AVMs, cauterized during EGD  -Protonix 40mg po bid  -Monitor Hg/Hct, transfuse if Hg <8  -EPO with dialysis     #Chronic diastolic CHF, stable  #Bioprosthetic aortic valve  -TTE done 3/14/2021; reviewed reading  -AV normally functioning  -continue Cardizem     #HTN  -C/w Imdur   -Continue Cardizem  -Monitor vitals    #DM-II, A1c 5.7 3/26  - ISS  - fingersticks q ac and hs  - A1C with Estimated Average Glucose Result: 5.7 % (03.26.21 @ 02:38)  - diet controlled    #BPH  -continue Flomax     will follow  d/w dr. desai

## 2021-04-29 NOTE — DISCHARGE NOTE PROVIDER - NSDCCPCAREPLAN_GEN_ALL_CORE_FT
PRINCIPAL DISCHARGE DIAGNOSIS  Diagnosis: PAD (peripheral artery disease)  Assessment and Plan of Treatment: You have peripheral artery disease and underwent recent left femoral-popliteal bypass vascular surgery. You came to [place] for rehab and you did well with therapy and made gains with your strength and endurance. You will need to follow up with the vascular surgeon for continued management and continue on your medications.      SECONDARY DISCHARGE DIAGNOSES  Diagnosis: Deep vein thrombosis (DVT) of femoral vein of left lower extremity  Assessment and Plan of Treatment: You developed clots in the left lower leg. You were on heparin for prophylaxis and a repeat ultrasound showed recanalization of the femoral vein and the rest of the clots were stable. You should continue on the heparin and continue to follow up with the vascular surgery team and your primary care physician.    Diagnosis: Congestive heart failure (CHF)  Assessment and Plan of Treatment: You should continue on the heart failure medications that you were taking in the hospital and should follow up with your cardiologist for continued management and adjustments.    Diagnosis: CKD (chronic kidney disease)  Assessment and Plan of Treatment: At [place], you were receiving dialysis 3 times per week and this was helping your breathing and your functional ability. At home you are getting dialysis twice a week. You should follow up with your nephrologist for continued management of your kidney disease.     PRINCIPAL DISCHARGE DIAGNOSIS  Diagnosis: PAD (peripheral artery disease)  Assessment and Plan of Treatment: You have peripheral artery disease and underwent recent left femoral-popliteal bypass vascular surgery. You came to [place] for rehab and you did well with therapy and made gains with your strength and endurance. You will need to follow up with the vascular surgeon for continued management and continue on your medications.      SECONDARY DISCHARGE DIAGNOSES  Diagnosis: Deep vein thrombosis (DVT) of femoral vein of left lower extremity  Assessment and Plan of Treatment: You developed clots in the left lower leg. You were on heparin for prophylaxis and a repeat ultrasound showed recanalization of the femoral vein and the rest of the clots were stable. You should continue to follow up with the vascular surgery team and your primary care physician.    Diagnosis: Congestive heart failure (CHF)  Assessment and Plan of Treatment: You should continue on the heart failure medications that you were taking in the hospital and should follow up with your cardiologist for continued management and adjustments.    Diagnosis: CKD (chronic kidney disease)  Assessment and Plan of Treatment: At [place], you were receiving dialysis 3 times per week and this was helping your breathing and your functional ability. At home you are getting dialysis twice a week. You should follow up with your nephrologist for continued management of your kidney disease.

## 2021-04-29 NOTE — DISCHARGE NOTE PROVIDER - NSDCACTIVITY_GEN_ALL_CORE
Do not drive or operate machinery/No heavy lifting/straining Do not drive or operate machinery/Showering allowed/Stairs allowed/Walking - Indoors allowed/No heavy lifting/straining/Walking - Outdoors allowed

## 2021-04-29 NOTE — DISCHARGE NOTE PROVIDER - CARE PROVIDERS DIRECT ADDRESSES
,[email or web address] ,[email or web address] [email or web address],DirectAddress_Unknown

## 2021-04-29 NOTE — DISCHARGE NOTE PROVIDER - NSDCMRMEDTOKEN_GEN_ALL_CORE_FT
Cymbalta 60 mg oral delayed release capsule: 1 cap(s) orally once a day  dilTIAZem 240 mg/24 hours oral capsule, extended release: 1 cap(s) orally once a day  heparin 5000 units/mL injectable solution: 5000 unit(s) subcutaneously 3 times a day   isosorbide mononitrate 30 mg oral tablet, extended release: 1 tab(s) orally once a day  Nephro-Thomas oral tablet: 1 tab(s) orally once a day  ocular lubricant ophthalmic solution: 1 drop(s) to each affected eye 2 times a day, As needed, Dry Eyes  pantoprazole 40 mg oral delayed release tablet: 1 tab(s) orally every 12 hours  Plavix 75 mg oral tablet: 1 tab(s) orally once a day  tamsulosin 0.4 mg oral capsule: 1 cap(s) orally once a day (at bedtime)   Cymbalta 60 mg oral delayed release capsule: 1 cap(s) orally once a day  dilTIAZem 240 mg/24 hours oral capsule, extended release: 1 cap(s) orally once a day  isosorbide mononitrate 30 mg oral tablet, extended release: 1 tab(s) orally once a day  Nephro-Thomas oral tablet: 1 tab(s) orally once a day  ocular lubricant ophthalmic solution: 1 drop(s) to each affected eye 2 times a day, As needed, Dry Eyes  pantoprazole 40 mg oral delayed release tablet: 1 tab(s) orally every 12 hours  Plavix 75 mg oral tablet: 1 tab(s) orally once a day  tamsulosin 0.4 mg oral capsule: 1 cap(s) orally once a day (at bedtime)

## 2021-04-29 NOTE — DISCHARGE NOTE PROVIDER - PROVIDER TOKENS
PROVIDER:[TOKEN:[[id number]]],PROVIDER:[TOKEN:[[id number]]],PROVIDER:[TOKEN:[[id number]]],PROVIDER:[TOKEN:[[id number]]] PROVIDER:[TOKEN:[[id number]]],PROVIDER:[TOKEN:[[id number]]],PROVIDER:[TOKEN:[[id number]]],PROVIDER:[TOKEN:[[id number]]],PROVIDER:[TOKEN:[[id number]],FOLLOWUP:[2 weeks]] PROVIDER:[TOKEN:[[id number]],FOLLOWUP:[1-3 days]],PROVIDER:[TOKEN:[[id number]],FOLLOWUP:[1 week]],PROVIDER:[TOKEN:[[id number]],FOLLOWUP:[1 week]],PROVIDER:[TOKEN:[[id number]],FOLLOWUP:[1 month]],PROVIDER:[TOKEN:[[id number]],FOLLOWUP:[2 weeks]]

## 2021-04-29 NOTE — DISCHARGE NOTE PROVIDER - NSDCFUADDINST_GEN_ALL_CORE_FT
Please clean incisions with saline and cover with dry dressing once daily until follow up with vascular surgeon  You have an appointment with Vascular surgery on 5/4/21 when sutures and staples will be removed as per discretion of your surgeon. You have dry gangrene on your toes in left foot. Please follow up with surgery about this  Please clean incisions with saline and cover with dry dressing once daily until follow up with vascular surgeon  You have an appointment with Vascular surgery on 5/4/21 when sutures and staples will be removed as per discretion of your surgeon. You have dry gangrene on your toes in left foot. Please follow up with surgery about this     You have a blood clot in left leg that is improving. Please follow up with Vascular surgeon about repeat studies

## 2021-04-29 NOTE — DISCHARGE NOTE PROVIDER - NPI NUMBER (FOR SYSADMIN USE ONLY) :
[[id number]],[[id number]] [[id number]],[[id number]],[[id number]]

## 2021-04-29 NOTE — PROGRESS NOTE ADULT - ATTENDING COMMENTS
Patient seen with resident Dr. Brandon Robbins  No new issues this am   Slept well  Progressing well in therapy  Scheduled for dc home on 4/30 after HD

## 2021-04-29 NOTE — PROGRESS NOTE ADULT - ASSESSMENT
Pt is a 85 y/o M with PMHx of HTN, HLD, CAD, HFpEF, s/p Right CEA for Carotid artery disease, ESRD on HD 2d/week (M/Fri) via LUE fistula, s/p flecainide w/ ILR placed 6/2020, AS s/p TAVR, and PAD who presented to [facility] on 3/13/21 with GI bleed, as well as LLE pain, found to have occlusion of left superficial femoral artery. Pt underwent EGD and AVM cauterizations. He also underwent left fem-pop bypass x2 and was found to have LLE DVT, treated with heparin. He has functional deficits of with his ambulation and his endurance.      Comprehensive rehab program: -PT/OT/ST-total of 3 hrs/day 5 days/week     #PAD:- Continue Plavix 75mg  - s/p fem-pop bypass and revision  -     #DVT  - left femoral vein DVT  - on Heparin sq tid  - ?patient not a candidate for IVC filter  - repeat dopplers showed recanalization of left femoral vein, stable DVTs in more distal veins    #UGIB  - egd showed multiple AVMs, cauterized during EGD  - Protonix bid    #CKD  - HD per renal   - Epoetin Yanick with dialysis  - labs with dialysis    #CHF  - continue Cardizem 240mg daily  - Torsemide d/c by renal - 4/9   - CXR with Pleural effusion  - Imdur 30mg daily    #HTN  - Hydralazine d/c'd  - continue to monitor    #DM  - ISS  - FS has been controlled - FS bid     #Pain  - Cymbalta 60mg daily  - Tylenol PRN, modalities to left knee    #BPH  - continue Flomax   - Patient with some spontaneous void     #GI  - Miralax, senna    #Skin  - Bilateral heel pressure ulcers DTI: off-load heels, Elevate leg while in bed   - Sacrum and buttock decubitus ulcers: were unstageable, starting to heal  Incisions at left calf, left thigh, groin- staple-        #Diet  - Soft and bite sized  - Consistent Carb, DASH/TLC, Renal diet    Hospitalist and renal fu noted     #Dispo  Team meeting DATE: 4/27  Patient progressing well in therapy   EDOD: 4/30      Hospitalist and renal fu  noted.  Staples/sutures to be removed at surgical fu  Awaiting call back from vascular about continued heparin  Discussed elevated BP with renal, since hydralazine has been discontinued. Recommends continuing current meds and fu as outpatient with PCP/cardio

## 2021-04-29 NOTE — DISCHARGE NOTE PROVIDER - CARE PROVIDER_API CALL
Siva Winston (MD)  Surgery; Vascular Surgery  [address] [facility], [address]st [place] [address]  Phone: [phone]  Fax: [phone]  Follow Up Time:     Erinn Contreras (DO)  Gastroenterology  [address] [facility], [address]  [place] [address]  Phone: [phone]  Fax: [phone]  Follow Up Time:     Bay Avendano)  Internal Medicine; Nephrology  [address] [place] [address]  Phone: [phone]  Fax: [phone]  Follow Up Time:     Mango Champagne)  Orthopaedic Surgery  [address] [facility] [facility], [address]  [place] [address]  Phone: [phone]  Fax: [phone]  Follow Up Time:    Siva Winston (MD)  Surgery; Vascular Surgery  [address] [facility], [address]st [place] [address]  Phone: [phone]  Fax: [phone]  Follow Up Time:     Erinn Contreras (DO)  Gastroenterology  [address] [facility], [address]  [place] [address]  Phone: [phone]  Fax: [phone]  Follow Up Time:     Bay Avendano)  Internal Medicine; Nephrology  [address] [place] [address]  Phone: [phone]  Fax: [phone]  Follow Up Time:     Mango Champagne)  Orthopaedic Surgery  [address] [facility], [address]  [place] [address]  Phone: [phone]  Fax: [phone]  Follow Up Time:     MANGO GOMEZ  Cardiology  [address] [place] [address].  [place] [address]  Phone: [phone]  Fax: [phone]  Follow Up Time: 2 weeks   Siva Winston (MD)  Surgery; Vascular Surgery  [address] [facility], [address]st [place] [address]  Phone: [phone]  Fax: [phone]  Follow Up Time: 1-3 days    Erinn Contreras (DO)  Gastroenterology  [address] [facility], [address]  [place] [address]  Phone: [phone]  Fax: [phone]  Follow Up Time: 1 week    Bay Avendano)  Internal Medicine; Nephrology  [address] [place] [address]  Phone: [phone]  Fax: [phone]  Follow Up Time: 1 week    Mango Champagne)  Orthopaedic Surgery  [address] [facility]  [place] [address]  Phone: [phone]  Fax: [phone]  Follow Up Time: 1 month    MANGO GOMEZ  Cardiology  [address] [place] [address]  Phone: [phone]  Fax: [phone]  Follow Up Time: 2 weeks

## 2021-04-29 NOTE — DISCHARGE NOTE PROVIDER - HOSPITAL COURSE
Pt is a 85 y/o M with PMHx of HTN, HLD, CAD, HFpEF, s/p Right CEA for Carotid artery disease, ESRD on HD 2d/week (M/Fri) via LUE fistula, s/p flecainide w/ ILR placed 6/2020, AS s/p TAVR, and PAD (RLE fem-pop bypass October 2020) who presented to [facility] on 3/13/21 with GI bleed, as well as LLE pain. 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 When patient was stable from GI, medical, cardiac standpoint, he went for LLE angiogram on 3/18, and found occlusion of L Superficial femoral artery. He was planned for a LLE fem-pop bypass, and had the bypass performed on 3/20. On 3/22, patient was a RRT for AMS, fever. Work-up revealed pleural effusions and L femoral DVT. He was started on abx for presumed pna and hep gtt for DVT. Patient began to show signs of malperfusion with coolness to touch, mottling of skin to LLE. He was planned for revision of LLE bypass. Went to OR on 3/25 for LLE bypass revision with explant of PTFE bypass, CFA to AT bypass with cryovein performed. Intra-op patient with 1500 ml of blood loss requiring 8 unit PRBC, 4 unit FFP, 1 donor unit plt.  During procedure, pt lost function of PPM with period of asystole requiring transcutaneous pacing. Cardiology consulted and pacemaker interrogated in the OR.  Pt taken to PACU post op and SICU consulted. Patient went to SICU post-op. On 3/27 patient was extubated and was downgraded from SICU to floor the next day. Rest of hospital course was uneventful. He worked with PT, was evaluated by PM&R and approved for acute rehab. The patient remained on heparin sq while in the hospital for tx of dvt (his plavix was held). Upon discharge, approved by CHALINO and Dr. Albrecht to resume plavix. He will remain on heparin sq while at rehab for dvt tx and ppx. He is not a candidate for IVC filter. Upon discharge, pain is well controlled, tolerating diet, remains HD stable, no clinical signs/symptoms of GI bleed, LLE remains warm and well perfused, he has a strong LLE palpable graft pulse and dopplerable signals. Per attending, he is medically stable for discharge to AR and on 4/8/21 he was transferred to [facility] for acute inpatient rehabilitation.    Patient was stable upon rehab admission to  [facility]. Admitted with gait instability, ADL, and functional impairments.     Rehab Course significant for dialysis, which he received three days during the week. He did well with the extra dialysis and made gains with his strength. He had a repeat doppler of his left lower extremity, which showed re-canalization of the left femoral vein, and stable DVTs in the more distal veins. All other medical co-morbidities were stable. Patient tolerated course of inpatient PT/OT/SLP rehab with significant functional improvements and met rehab goals prior to discharge. Patient was medically cleared on 4/30/21 for discharged to home.    Patient will follow up with the following:   Cardiology  Vascular Surgery  Nephrology  GI  PCP  PM&R    On the day of discharge, the patient was seen and examined. Symptoms improved. Vital signs are stable. Labs and imaging reviewed. Patient is medically optimized and hemodynamically stable. Return precautions discussed, medication teach back done, and importance of physician followup emphasized. The patient verbalized understanding.

## 2021-04-29 NOTE — DISCHARGE NOTE PROVIDER - DETAILS OF MALNUTRITION DIAGNOSIS/DIAGNOSES
This patient has been assessed with a concern for Malnutrition and was treated during this hospitalization for the following Nutrition diagnosis/diagnoses:     -  04/09/2021: Moderate protein-calorie malnutrition

## 2021-04-29 NOTE — DISCHARGE NOTE PROVIDER - NSDCFUSCHEDAPPT_GEN_ALL_CORE_FT
CHRISTIANO ELIZABETH ; 05/04/2021 ; NPP Vascular [address]  CHRISTIANO ELIZABETH ; 05/06/2021 ; NPP [facility]  CHRISTIANO ELIZABETH ; 05/24/2021 ; [facility] ; 06/28/2021 ; NPP Cardio Electro [address] [facility]

## 2021-04-29 NOTE — DISCHARGE NOTE PROVIDER - INSTRUCTIONS
Soft and bite sized diet. Try to avoid excess sugar, carbohydrates, salt, cholesterol, or potassium.

## 2021-04-29 NOTE — PROGRESS NOTE ADULT - SUBJECTIVE AND OBJECTIVE BOX
HPI:  Pt is a 87 y/o M with PMHx of HTN, HLD, CAD, HFpEF, s/p Right CEA for Carotid artery disease, ESRD on HD 2d/week (M/Fri) via LUE fistula, s/p flecainide w/ ILR placed 6/2020, AS s/p TAVR, and PAD (RLE fem-pop bypass October 2020) who presented to [facility] on 3/13/21 with GI bleed, as well as LLE pain. 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 When patient was stable from GI, medical, cardiac standpoint, he went for LLE angiogram on 3/18, and found occlusion of L Superficial femoral artery. He was planned for a LLE fem-pop bypass, and had the bypass performed on 3/20. On 3/22, patient was a RRT for AMS, fever. Work-up revealed pleural effusions and L femoral DVT. He was started on abx for presumed pna and hep gtt for DVT. Patient began to show signs of malperfusion with coolness to touch, mottling of skin to LLE. He was planned for revision of LLE bypass. Went to OR on 3/25 for LLE bypass revision with explant of PTFE bypass, CFA to AT bypass with cryovein performed. Intra-op patient with 1500 ml of blood loss requiring 8 unit PRBC, 4 unit FFP, 1 donor unit plt.  During procedure, pt lost function of PPM with period of asystole requiring transcutaneous pacing. Cardiology consulted and pacemaker interrogated in the OR.  Pt taken to PACU post op and SICU consulted. Patient went to SICU post-op. On 3/27 patient was extubated and was downgraded from SICU to floor the next day. Rest of hospital course was uneventful. He worked with PT, was evaluated by PM&R and approved for acute rehab. The patient remained on heparin sq while in the hospital for tx of dvt (his plavix was held). Upon discharge, approved by CHALINO and Dr. Albrecht to resume plavix. He will remain on heparin sq while at rehab for dvt tx and ppx. He is not a candidate for IVC filter. Upon discharge, pain is well controlled, tolerating diet, remains HD stable, no clinical signs/symptoms of GI bleed, LLE remains warm and well perfused, he has a strong LLE palpable graft pulse and dopplerable signals. Per attending, he is medically stable for discharge to AR and on 4/8/21 he was transferred to Health system for acute inpatient rehabilitation.    SUBJECTIVE / INTERVAL HPI: Patient seen and examined in PT gym. He is doing well, he tolerated therapy well, despite some left knee pain when he puts pressure on the knee. His swelling looks improved. He had dialysis yesterday. He has no complaints and is sleeping well at night.     REVIEW OF SYSTEMS:    CONSTITUTIONAL: No fevers or chills  EYES/ENT: No visual changes;  No vertigo or throat pain   NECK: No pain or stiffness  RESPIRATORY: No cough, wheezing, or shortness of breath  CARDIOVASCULAR: No chest pain or palpitations  GASTROINTESTINAL: No abdominal or epigastric pain. No nausea or vomiting. No diarrhea or constipation.   GENITOURINARY: No dysuria, frequency or hematuria  NEUROLOGICAL: No numbness or weakness  SKIN: No itching, rashes  MSK: left knee pain with ambulation      VITAL SIGNS:  Vital Signs Last 24 Hrs  T(C): 37.1 (28 Apr 2021 20:06), Max: 37.1 (28 Apr 2021 20:06)  T(F): 98.8 (28 Apr 2021 20:06), Max: 98.8 (28 Apr 2021 20:06)  HR: 68 (28 Apr 2021 20:06) (63 - 68)  BP: 135/47 (28 Apr 2021 20:06) (126/52 - 155/57)  BP(mean): --  RR: 15 (28 Apr 2021 20:06) (14 - 15)  SpO2: 99% (28 Apr 2021 20:06) (99% - 99%)    PHYSICAL EXAM:    General: NAD, sitting comfortably in PT gym  HEENT: NC/AT; PERRL, anicteric sclera; MMM  Neck: supple  Cardiovascular: +S1/S2  Respiratory: CTA B/L; no wheezes no crackles  Gastrointestinal: soft, NT/ND  Extremities: warm, well perfused; Left AV fistula intact, LLE with swelling which improved, erythema improved  Neurological: AAOx3; no focal motor or sensory deficits  Skin: Decubitus bilateral heel pressure ulcers DTI,    Left groin and lateral thigh incisions with staples are clean with minimal drainage. Left calf dressing with serosanguinous drainage, incisions: lateral (suture) and medial (staples).  Gangrenous toes on left foot.   Decubitus sacral and buttock ulcers present- unstageable- improving - now  stage 2 and healing well     MEDICATIONS:  MEDICATIONS  (STANDING):  clopidogrel Tablet 75 milliGRAM(s) Oral daily  dextrose 40% Gel 15 Gram(s) Oral once  dextrose 5%. 1000 milliLiter(s) (50 mL/Hr) IV Continuous <Continuous>  dextrose 5%. 1000 milliLiter(s) (100 mL/Hr) IV Continuous <Continuous>  dextrose 50% Injectable 25 Gram(s) IV Push once  dextrose 50% Injectable 12.5 Gram(s) IV Push once  dextrose 50% Injectable 25 Gram(s) IV Push once  diltiazem    milliGRAM(s) Oral daily  DULoxetine 60 milliGRAM(s) Oral daily  epoetin juan-epbx (RETACRIT) Injectable 99565 Unit(s) IV Push <User Schedule>  glucagon  Injectable 1 milliGRAM(s) IntraMuscular once  heparin   Injectable 5000 Unit(s) SubCutaneous every 8 hours  insulin lispro (ADMELOG) corrective regimen sliding scale   SubCutaneous two times a day with meals  isosorbide   mononitrate ER Tablet (IMDUR) 30 milliGRAM(s) Oral daily  melatonin 6 milliGRAM(s) Oral at bedtime  Nephro-pito 1 Tablet(s) Oral daily  pantoprazole    Tablet 40 milliGRAM(s) Oral two times a day  polyethylene glycol 3350 17 Gram(s) Oral daily  senna 2 Tablet(s) Oral at bedtime  tamsulosin 0.4 milliGRAM(s) Oral at bedtime    MEDICATIONS  (PRN):  acetaminophen   Tablet .. 650 milliGRAM(s) Oral every 6 hours PRN Temp greater or equal to 38C (100.4F), Mild Pain (1 - 3)  artificial  tears Solution 1 Drop(s) Both EYES two times a day PRN Dry Eyes      ALLERGIES:  Allergies    Plavix (Hives)  Toprol-XL (Rash)    Intolerances        LABS:                        10.1   6.14  )-----------( 199      ( 28 Apr 2021 15:10 )             31.1     04-28    135  |  99  |  60<H>  ----------------------------<  159<H>  4.4   |  27  |  4.72<H>    Ca    9.3      28 Apr 2021 15:10  Phos  3.1     04-28    TPro  6.4  /  Alb  2.4<L>  /  TBili  0.6  /  DBili  x   /  AST  18  /  ALT  15  /  AlkPhos  121<H>  04-28        CAPILLARY BLOOD GLUCOSE      POCT Blood Glucose.: 102 mg/dL (29 Apr 2021 07:13)      RADIOLOGY & ADDITIONAL TESTS: Reviewed.

## 2021-04-29 NOTE — PROGRESS NOTE ADULT - SUBJECTIVE AND OBJECTIVE BOX
Resting    Vital Signs Last 24 Hrs  T(C): 36.9 (04-29-21 @ 08:18), Max: 37.1 (04-28-21 @ 20:06)  T(F): 98.4 (04-29-21 @ 08:18), Max: 98.8 (04-28-21 @ 20:06)  HR: 70 (04-29-21 @ 08:18) (68 - 70)  BP: 128/74 (04-29-21 @ 12:19) (128/74 - 140/72)  RR: 14 (04-29-21 @ 12:19) (14 - 15)  SpO2: 96% (04-29-21 @ 12:19) (96% - 99%)    s1s2  b/l air entry  soft  tr edema b/l LE LUE AVF patent                                                                                                                                          10.1   6.14  )-----------( 199      ( 28 Apr 2021 15:10 )             31.1     28 Apr 2021 15:10    135    |  99     |  60     ----------------------------<  159    4.4     |  27     |  4.72     Ca    9.3        28 Apr 2021 15:10  Phos  3.1       28 Apr 2021 15:10    TPro  6.4    /  Alb  2.4    /  TBili  0.6    /  DBili  x      /  AST  18     /  ALT  15     /  AlkPhos  121    28 Apr 2021 15:10    LIVER FUNCTIONS - ( 28 Apr 2021 15:10 )  Alb: 2.4 g/dL / Pro: 6.4 g/dL / ALK PHOS: 121 U/L / ALT: 15 U/L / AST: 18 U/L / GGT: x           acetaminophen   Tablet .. 650 milliGRAM(s) Oral every 6 hours PRN  artificial  tears Solution 1 Drop(s) Both EYES two times a day PRN  clopidogrel Tablet 75 milliGRAM(s) Oral daily  dextrose 40% Gel 15 Gram(s) Oral once  dextrose 5%. 1000 milliLiter(s) IV Continuous <Continuous>  dextrose 5%. 1000 milliLiter(s) IV Continuous <Continuous>  dextrose 50% Injectable 25 Gram(s) IV Push once  dextrose 50% Injectable 12.5 Gram(s) IV Push once  dextrose 50% Injectable 25 Gram(s) IV Push once  diltiazem    milliGRAM(s) Oral daily  DULoxetine 60 milliGRAM(s) Oral daily  epoetin juan-epbx (RETACRIT) Injectable 05286 Unit(s) IV Push <User Schedule>  glucagon  Injectable 1 milliGRAM(s) IntraMuscular once  heparin   Injectable 5000 Unit(s) SubCutaneous every 8 hours  insulin lispro (ADMELOG) corrective regimen sliding scale   SubCutaneous two times a day with meals  isosorbide   mononitrate ER Tablet (IMDUR) 30 milliGRAM(s) Oral daily  melatonin 6 milliGRAM(s) Oral at bedtime  Nephro-pito 1 Tablet(s) Oral daily  pantoprazole    Tablet 40 milliGRAM(s) Oral two times a day  polyethylene glycol 3350 17 Gram(s) Oral daily  senna 2 Tablet(s) Oral at bedtime  tamsulosin 0.4 milliGRAM(s) Oral at bedtime    A/P:    S/p complicated hospital course as per HPI  ESRD on HD  HD MWF  TMP 2kg w/HD as able  Epogen  Renal diet  HD in am prior to d/c    [phone]

## 2021-04-30 ENCOUNTER — TRANSCRIPTION ENCOUNTER (OUTPATIENT)
Age: 86
End: 2021-04-30

## 2021-04-30 VITALS
OXYGEN SATURATION: 99 % | SYSTOLIC BLOOD PRESSURE: 130 MMHG | RESPIRATION RATE: 16 BRPM | DIASTOLIC BLOOD PRESSURE: 48 MMHG | HEART RATE: 63 BPM

## 2021-04-30 LAB
ANION GAP SERPL CALC-SCNC: 10 MMOL/L — SIGNIFICANT CHANGE UP (ref 5–17)
BUN SERPL-MCNC: 53 MG/DL — HIGH (ref 7–23)
CALCIUM SERPL-MCNC: 9.8 MG/DL — SIGNIFICANT CHANGE UP (ref 8.4–10.5)
CHLORIDE SERPL-SCNC: 97 MMOL/L — SIGNIFICANT CHANGE UP (ref 96–108)
CO2 SERPL-SCNC: 28 MMOL/L — SIGNIFICANT CHANGE UP (ref 22–31)
CREAT SERPL-MCNC: 4.57 MG/DL — HIGH (ref 0.5–1.3)
GLUCOSE BLDC GLUCOMTR-MCNC: 123 MG/DL — HIGH (ref 70–99)
GLUCOSE BLDC GLUCOMTR-MCNC: 241 MG/DL — HIGH (ref 70–99)
GLUCOSE SERPL-MCNC: 151 MG/DL — HIGH (ref 70–99)
HCT VFR BLD CALC: 32.6 % — LOW (ref 39–50)
HGB BLD-MCNC: 10.4 G/DL — LOW (ref 13–17)
MCHC RBC-ENTMCNC: 30.7 PG — SIGNIFICANT CHANGE UP (ref 27–34)
MCHC RBC-ENTMCNC: 31.9 GM/DL — LOW (ref 32–36)
MCV RBC AUTO: 96.2 FL — SIGNIFICANT CHANGE UP (ref 80–100)
NRBC # BLD: 0 /100 WBCS — SIGNIFICANT CHANGE UP (ref 0–0)
PHOSPHATE SERPL-MCNC: 3.8 MG/DL — SIGNIFICANT CHANGE UP (ref 2.5–4.5)
PLATELET # BLD AUTO: 184 K/UL — SIGNIFICANT CHANGE UP (ref 150–400)
POTASSIUM SERPL-MCNC: 4.4 MMOL/L — SIGNIFICANT CHANGE UP (ref 3.5–5.3)
POTASSIUM SERPL-SCNC: 4.4 MMOL/L — SIGNIFICANT CHANGE UP (ref 3.5–5.3)
RBC # BLD: 3.39 M/UL — LOW (ref 4.2–5.8)
RBC # FLD: 18.5 % — HIGH (ref 10.3–14.5)
SARS-COV-2 RNA SPEC QL NAA+PROBE: SIGNIFICANT CHANGE UP
SODIUM SERPL-SCNC: 135 MMOL/L — SIGNIFICANT CHANGE UP (ref 135–145)
WBC # BLD: 6.36 K/UL — SIGNIFICANT CHANGE UP (ref 3.8–10.5)
WBC # FLD AUTO: 6.36 K/UL — SIGNIFICANT CHANGE UP (ref 3.8–10.5)

## 2021-04-30 PROCEDURE — U0005: CPT

## 2021-04-30 PROCEDURE — 87635 SARS-COV-2 COVID-19 AMP PRB: CPT

## 2021-04-30 PROCEDURE — 92610 EVALUATE SWALLOWING FUNCTION: CPT

## 2021-04-30 PROCEDURE — U0003: CPT

## 2021-04-30 PROCEDURE — 82962 GLUCOSE BLOOD TEST: CPT

## 2021-04-30 PROCEDURE — 99238 HOSP IP/OBS DSCHRG MGMT 30/<: CPT

## 2021-04-30 PROCEDURE — 80053 COMPREHEN METABOLIC PANEL: CPT

## 2021-04-30 PROCEDURE — 97116 GAIT TRAINING THERAPY: CPT

## 2021-04-30 PROCEDURE — 97535 SELF CARE MNGMENT TRAINING: CPT

## 2021-04-30 PROCEDURE — 84100 ASSAY OF PHOSPHORUS: CPT

## 2021-04-30 PROCEDURE — 99261: CPT

## 2021-04-30 PROCEDURE — 87340 HEPATITIS B SURFACE AG IA: CPT

## 2021-04-30 PROCEDURE — 97140 MANUAL THERAPY 1/> REGIONS: CPT

## 2021-04-30 PROCEDURE — 71045 X-RAY EXAM CHEST 1 VIEW: CPT

## 2021-04-30 PROCEDURE — 85027 COMPLETE CBC AUTOMATED: CPT

## 2021-04-30 PROCEDURE — 93971 EXTREMITY STUDY: CPT

## 2021-04-30 PROCEDURE — 97530 THERAPEUTIC ACTIVITIES: CPT

## 2021-04-30 PROCEDURE — 97167 OT EVAL HIGH COMPLEX 60 MIN: CPT

## 2021-04-30 PROCEDURE — 92523 SPEECH SOUND LANG COMPREHEN: CPT

## 2021-04-30 PROCEDURE — 97110 THERAPEUTIC EXERCISES: CPT

## 2021-04-30 PROCEDURE — 36415 COLL VENOUS BLD VENIPUNCTURE: CPT

## 2021-04-30 PROCEDURE — 92526 ORAL FUNCTION THERAPY: CPT

## 2021-04-30 PROCEDURE — 99232 SBSQ HOSP IP/OBS MODERATE 35: CPT

## 2021-04-30 PROCEDURE — 80048 BASIC METABOLIC PNL TOTAL CA: CPT

## 2021-04-30 PROCEDURE — 97163 PT EVAL HIGH COMPLEX 45 MIN: CPT

## 2021-04-30 PROCEDURE — 86706 HEP B SURFACE ANTIBODY: CPT

## 2021-04-30 PROCEDURE — 80069 RENAL FUNCTION PANEL: CPT

## 2021-04-30 PROCEDURE — 92507 TX SP LANG VOICE COMM INDIV: CPT

## 2021-04-30 PROCEDURE — 86803 HEPATITIS C AB TEST: CPT

## 2021-04-30 PROCEDURE — 86769 SARS-COV-2 COVID-19 ANTIBODY: CPT

## 2021-04-30 RX ADMIN — HEPARIN SODIUM 5000 UNIT(S): 5000 INJECTION INTRAVENOUS; SUBCUTANEOUS at 14:25

## 2021-04-30 RX ADMIN — CLOPIDOGREL BISULFATE 75 MILLIGRAM(S): 75 TABLET, FILM COATED ORAL at 12:48

## 2021-04-30 RX ADMIN — DULOXETINE HYDROCHLORIDE 60 MILLIGRAM(S): 30 CAPSULE, DELAYED RELEASE ORAL at 12:48

## 2021-04-30 RX ADMIN — Medication 240 MILLIGRAM(S): at 05:38

## 2021-04-30 RX ADMIN — Medication 4: at 16:35

## 2021-04-30 RX ADMIN — ERYTHROPOIETIN 10000 UNIT(S): 10000 INJECTION, SOLUTION INTRAVENOUS; SUBCUTANEOUS at 11:24

## 2021-04-30 RX ADMIN — PANTOPRAZOLE SODIUM 40 MILLIGRAM(S): 20 TABLET, DELAYED RELEASE ORAL at 05:38

## 2021-04-30 RX ADMIN — Medication 650 MILLIGRAM(S): at 16:36

## 2021-04-30 RX ADMIN — HEPARIN SODIUM 5000 UNIT(S): 5000 INJECTION INTRAVENOUS; SUBCUTANEOUS at 05:38

## 2021-04-30 RX ADMIN — ISOSORBIDE MONONITRATE 30 MILLIGRAM(S): 60 TABLET, EXTENDED RELEASE ORAL at 12:48

## 2021-04-30 RX ADMIN — Medication 1 TABLET(S): at 12:48

## 2021-04-30 RX ADMIN — Medication 650 MILLIGRAM(S): at 17:06

## 2021-04-30 NOTE — PROGRESS NOTE ADULT - ASSESSMENT
Pt is a 87 y/o M with PMHx of HTN, HLD, CAD, HFpEF, s/p Right CEA for Carotid artery disease, ESRD on HD 2d/week (M/Fri) via LUE fistula, s/p flecainide w/ ILR placed 6/2020, AS s/p TAVR, and PAD who presented to [facility] on 3/13/21 with GI bleed, as well as LLE pain, found to have occlusion of left superficial femoral artery. Pt underwent EGD and AVM cauterizations. He also underwent left fem-pop bypass x2 and was found to have LLE DVT, treated with heparin. He has functional deficits of with his ambulation and his endurance.      Comprehensive rehab program: -PT/OT/ST-total of 3 hrs/day 5 days/week     #PAD:- Continue Plavix 75mg  - s/p fem-pop bypass and revision  -     #DVT  - left femoral vein DVT  - on Heparin sq tid  - ?patient not a candidate for IVC filter  - repeat dopplers showed recanalization of left femoral vein, stable DVTs in more distal veins    #UGIB  - egd showed multiple AVMs, cauterized during EGD  - Protonix bid    #CKD  - HD per renal   - Epoetin Yanick with dialysis  - labs with dialysis    #CHF  - continue Cardizem 240mg daily  - Torsemide d/c by renal - 4/9   - CXR with Pleural effusion  - Imdur 30mg daily    #HTN  - Hydralazine d/c'd  - continue to monitor    #DM  - ISS  - FS has been controlled - FS bid     #Pain  - Cymbalta 60mg daily  - Tylenol PRN, modalities to left knee    #BPH  - continue Flomax   - Patient with some spontaneous void     #GI  - Miralax, senna    #Skin  - Bilateral heel pressure ulcers DTI: off-load heels, Elevate leg while in bed   - Sacrum and buttock decubitus ulcers: were unstageable, starting to heal  Incisions at left calf, left thigh, groin- staple-        #Diet  - Soft and bite sized  - Consistent Carb, DASH/TLC, Renal diet    Hospitalist and renal fu noted     #Dispo  Team meeting DATE: 4/27  Patient progressing well in therapy   EDOD: 4/30      Hospitalist and renal fu  noted.  Staples/sutures to be removed at surgical fu  Awaiting call back from vascular about continued heparin  Discussed elevated BP with renal, since hydralazine has been discontinued. Recommends continuing current meds and fu as outpatient with PCP/cardio

## 2021-04-30 NOTE — DISCHARGE NOTE NURSING/CASE MANAGEMENT/SOCIAL WORK - PATIENT PORTAL LINK FT
You can access the FollowMyHealth Patient Portal offered by [facility] by registering at the following website: [email or web address]. By joining [facility]’s FollowMyHealth portal, you will also be able to view your health information using other applications (apps) compatible with our system.

## 2021-04-30 NOTE — PROGRESS NOTE ADULT - SUBJECTIVE AND OBJECTIVE BOX
HPI:  Pt is a 87 y/o M with PMHx of HTN, HLD, CAD, HFpEF, s/p Right CEA for Carotid artery disease, ESRD on HD 2d/week (M/Fri) via LUE fistula, s/p flecainide w/ ILR placed 6/2020, AS s/p TAVR, and PAD (RLE fem-pop bypass October 2020) who presented to [facility] on 3/13/21 with GI bleed, as well as LLE pain. 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 When patient was stable from GI, medical, cardiac standpoint, he went for LLE angiogram on 3/18, and found occlusion of L Superficial femoral artery. He was planned for a LLE fem-pop bypass, and had the bypass performed on 3/20. On 3/22, patient was a RRT for AMS, fever. Work-up revealed pleural effusions and L femoral DVT. He was started on abx for presumed pna and hep gtt for DVT. Patient began to show signs of malperfusion with coolness to touch, mottling of skin to LLE. He was planned for revision of LLE bypass. Went to OR on 3/25 for LLE bypass revision with explant of PTFE bypass, CFA to AT bypass with cryovein performed. Intra-op patient with 1500 ml of blood loss requiring 8 unit PRBC, 4 unit FFP, 1 donor unit plt.  During procedure, pt lost function of PPM with period of asystole requiring transcutaneous pacing. Cardiology consulted and pacemaker interrogated in the OR.  Pt taken to PACU post op and SICU consulted. Patient went to SICU post-op. On 3/27 patient was extubated and was downgraded from SICU to floor the next day. Rest of hospital course was uneventful. He worked with PT, was evaluated by PM&R and approved for acute rehab. The patient remained on heparin sq while in the hospital for tx of dvt (his plavix was held). Upon discharge, approved by CHLAINO and Dr. Albrecht to resume plavix. He will remain on heparin sq while at rehab for dvt tx and ppx. He is not a candidate for IVC filter. Upon discharge, pain is well controlled, tolerating diet, remains HD stable, no clinical signs/symptoms of GI bleed, LLE remains warm and well perfused, he has a strong LLE palpable graft pulse and dopplerable signals. Per attending, he is medically stable for discharge to AR and on 4/8/21 he was transferred to [facility] for acute inpatient rehabilitation.    SUBJECTIVE / INTERVAL HPI: Patient seen and examined at bedside. He is doing well and has no complaints this morning. He is being discharged today and is looking forward to going home. He will have dialysis this morning and one session of PT in the afternoon before his discharge. He denies any new pain or weakness.     REVIEW OF SYSTEMS:    CONSTITUTIONAL: No fevers or chills  EYES/ENT: No visual changes;  No vertigo or throat pain   NECK: No pain or stiffness  RESPIRATORY: No cough, wheezing, or shortness of breath  CARDIOVASCULAR: No chest pain or palpitations  GASTROINTESTINAL: No abdominal or epigastric pain. No nausea or vomiting. No diarrhea or constipation.   GENITOURINARY: No dysuria, frequency or hematuria  NEUROLOGICAL: No numbness or weakness  SKIN: No itching, rashes, +incisions      VITAL SIGNS:  Vital Signs Last 24 Hrs  T(C): 36.7 (30 Apr 2021 09:15), Max: 36.7 (30 Apr 2021 09:15)  T(F): 98 (30 Apr 2021 09:15), Max: 98 (30 Apr 2021 09:15)  HR: 69 (30 Apr 2021 09:15) (58 - 69)  BP: 138/59 (30 Apr 2021 09:15) (128/74 - 146/65)  BP(mean): --  RR: 15 (30 Apr 2021 09:15) (14 - 15)  SpO2: 100% (30 Apr 2021 09:15) (96% - 100%)    PHYSICAL EXAM:    General: NAD, sitting up in wheelchair comfortably  HEENT: NC/AT; PERRL, anicteric sclera; MMM  Neck: supple  Cardiovascular: +S1/S2  Respiratory: CTA B/L; no W/R/R, no crackles  Gastrointestinal: soft, NT/ND  Extremities: warm, well perfused; no edema, clubbing or cyanosis  Neurological: AAOx3; no focal deficits, motor and sensory is grossly intact  Skin: Decubitus bilateral heel pressure ulcers DTI,    Left groin and lateral thigh incisions with staples are clean with minimal drainage. Left calf dressing with serosanguinous drainage, incisions: lateral (suture) and medial (staples).  Gangrenous toes on left foot.   Decubitus sacral and buttock ulcers present- unstageable- improving - now  stage 2 and healing well     MEDICATIONS:  MEDICATIONS  (STANDING):  clopidogrel Tablet 75 milliGRAM(s) Oral daily  dextrose 40% Gel 15 Gram(s) Oral once  dextrose 5%. 1000 milliLiter(s) (50 mL/Hr) IV Continuous <Continuous>  dextrose 5%. 1000 milliLiter(s) (100 mL/Hr) IV Continuous <Continuous>  dextrose 50% Injectable 25 Gram(s) IV Push once  dextrose 50% Injectable 12.5 Gram(s) IV Push once  dextrose 50% Injectable 25 Gram(s) IV Push once  diltiazem    milliGRAM(s) Oral daily  DULoxetine 60 milliGRAM(s) Oral daily  epoetin juan-epbx (RETACRIT) Injectable 89666 Unit(s) IV Push <User Schedule>  glucagon  Injectable 1 milliGRAM(s) IntraMuscular once  heparin   Injectable 5000 Unit(s) SubCutaneous every 8 hours  insulin lispro (ADMELOG) corrective regimen sliding scale   SubCutaneous two times a day with meals  isosorbide   mononitrate ER Tablet (IMDUR) 30 milliGRAM(s) Oral daily  melatonin 6 milliGRAM(s) Oral at bedtime  Nephro-pito 1 Tablet(s) Oral daily  pantoprazole    Tablet 40 milliGRAM(s) Oral two times a day  polyethylene glycol 3350 17 Gram(s) Oral daily  senna 2 Tablet(s) Oral at bedtime  tamsulosin 0.4 milliGRAM(s) Oral at bedtime    MEDICATIONS  (PRN):  acetaminophen   Tablet .. 650 milliGRAM(s) Oral every 6 hours PRN Temp greater or equal to 38C (100.4F), Mild Pain (1 - 3)  artificial  tears Solution 1 Drop(s) Both EYES two times a day PRN Dry Eyes      ALLERGIES:  Allergies    Plavix (Hives)  Toprol-XL (Rash)    Intolerances        LABS:                        10.4   6.36  )-----------( 184      ( 30 Apr 2021 09:15 )             32.6     04-30    135  |  97  |  53<H>  ----------------------------<  151<H>  4.4   |  28  |  4.57<H>    Ca    9.8      30 Apr 2021 09:15  Phos  3.8     04-30    TPro  6.4  /  Alb  2.4<L>  /  TBili  0.6  /  DBili  x   /  AST  18  /  ALT  15  /  AlkPhos  121<H>  04-28        CAPILLARY BLOOD GLUCOSE      POCT Blood Glucose.: 123 mg/dL (30 Apr 2021 07:31)      RADIOLOGY & ADDITIONAL TESTS: Reviewed.

## 2021-04-30 NOTE — PROGRESS NOTE ADULT - NUTRITIONAL ASSESSMENT
This patient has been assessed with a concern for Malnutrition and has been determined to have a diagnosis/diagnoses of Moderate protein-calorie malnutrition.    This patient is being managed with:   Diet Dysphagia 3 Soft-Thin Liquids-  Consistent Carbohydrate {No Snacks}  DASH/TLC {Sodium & Cholesterol Restricted}  For patients receiving Renal Replacement - No Protein Restr No Conc K No Conc Phos Low Sodium  Supplement Feeding Modality:  Oral  Nepro Cans or Servings Per Day:  1       Frequency:  Three Times a day  Entered: Apr 13 2021 12:15PM    
This patient has been assessed with a concern for Malnutrition and has been determined to have a diagnosis/diagnoses of Moderate protein-calorie malnutrition.    This patient is being managed with:   Diet Dysphagia 3 Soft-Thin Liquids-  Consistent Carbohydrate {No Snacks}  DASH/TLC {Sodium & Cholesterol Restricted}  1500mL Fluid Restriction ([id number])  For patients receiving Renal Replacement - No Protein Restr No Conc K No Conc Phos Low Sodium  Supplement Feeding Modality:  Oral  Nepro Cans or Servings Per Day:  1       Frequency:  Three Times a day  Entered: Apr 9 2021  3:23PM    
This patient has been assessed with a concern for Malnutrition and has been determined to have a diagnosis/diagnoses of Moderate protein-calorie malnutrition.    This patient is being managed with:   Diet Dysphagia 3 Soft-Thin Liquids-  Consistent Carbohydrate {No Snacks}  DASH/TLC {Sodium & Cholesterol Restricted}  For patients receiving Renal Replacement - No Protein Restr No Conc K No Conc Phos Low Sodium  Supplement Feeding Modality:  Oral  Nepro Cans or Servings Per Day:  1       Frequency:  Three Times a day  Entered: Apr 13 2021 12:15PM    
This patient has been assessed with a concern for Malnutrition and has been determined to have a diagnosis/diagnoses of Moderate protein-calorie malnutrition.    This patient is being managed with:   Diet Dysphagia 3 Soft-Thin Liquids-  Consistent Carbohydrate {No Snacks}  DASH/TLC {Sodium & Cholesterol Restricted}  1500mL Fluid Restriction ([id number])  For patients receiving Renal Replacement - No Protein Restr No Conc K No Conc Phos Low Sodium  Supplement Feeding Modality:  Oral  Nepro Cans or Servings Per Day:  1       Frequency:  Three Times a day  Entered: Apr 9 2021  3:23PM    
This patient has been assessed with a concern for Malnutrition and has been determined to have a diagnosis/diagnoses of Moderate protein-calorie malnutrition.    This patient is being managed with:   Diet Dysphagia 3 Soft-Thin Liquids-  Consistent Carbohydrate {No Snacks}  DASH/TLC {Sodium & Cholesterol Restricted}  1500mL Fluid Restriction ([id number])  For patients receiving Renal Replacement - No Protein Restr No Conc K No Conc Phos Low Sodium  Supplement Feeding Modality:  Oral  Nepro Cans or Servings Per Day:  1       Frequency:  Three Times a day  Entered: Apr 9 2021  3:23PM    
This patient has been assessed with a concern for Malnutrition and has been determined to have a diagnosis/diagnoses of Moderate protein-calorie malnutrition.    This patient is being managed with:   Diet Dysphagia 3 Soft-Thin Liquids-  Consistent Carbohydrate {No Snacks}  DASH/TLC {Sodium & Cholesterol Restricted}  For patients receiving Renal Replacement - No Protein Restr No Conc K No Conc Phos Low Sodium  Supplement Feeding Modality:  Oral  Nepro Cans or Servings Per Day:  1       Frequency:  Three Times a day  Entered: Apr 13 2021 12:15PM    

## 2021-04-30 NOTE — PROGRESS NOTE ADULT - ASSESSMENT
87 y/o M with extensive PMH including HTN, HLD, CAD, HFpEF, s/p Right CEA for Carotid artery disease, ESRD on HD 2d/week (M/Fri) via LUE fistula, s/p flecainide w/ ILR placed 6/2020, AS s/p TAVR, and PAD (RLE fem-pop bypass October 2020) who presented to [facility] on 3/13/21 with GI bleed, as well as LLE pain due to occlusion of L superficial fem artery.  He had a complicated hospital course and also had acute DVT, PNA, bilateral pleural effusions.  Pt apparently was not a candidate for an IVC filter, now admitted for rehab- pt/ot/dvt ppx  #ESRD on HD  -HD MWF  - Renal on board  #PAD  -s/p fem-pop bypass and revision  -Continue Plavix    #Acute left femoral vein DVT  -Not on full dose AC due to GIB and high risk.   -DVT ppx with HSQ  -Per chart - patient not a candidate for IVC filter??  LLE edema- reassess after dialysis, check wound per wound care team, will monitor  US Duplex Venous Lower Ext Ltd, Left 4/26: Persistent deep vein thrombosis in the left popliteal vein, tibioperoneal trunk vein, and posterior tibial vein. Previously noted deep vein thrombosis in the left femoral vein has recanalized. 4.0 x 2.2 x 2.5 cm fluid collection in the left groin, decreased from prior measurement of 9.4 x 3.1 x 6.6 cm. Enlarged left groin lymph node measuring 1.6 cm in short axis.      #Acute blood loss anemia, stable, likely due to UGIB  -EGD showed multiple AVMs, cauterized during EGD  -Protonix 40mg po bid  -Monitor Hg/Hct, transfuse if Hg <8  -EPO with dialysis   - GI f/u OP    #Chronic diastolic CHF, stable  #Bioprosthetic aortic valve  -TTE done 3/14/2021; Ef 60-65%. grad 2 DD  -AV normally functioning  -continue Cardizem 240mg daily  - cardio f/u OP    #HTN    - better controlled  - Hydralazine was dced by renal on 4/21  -C/w Imdur 30.   -Continue Cardizem 240mg  -Monitor vitals    #DM-II, A1c 5.7 3/26  - ISS  - fingersticks q ac and hs  - A1C with Estimated Average Glucose Result: 5.7 % (03.26.21 @ 02:38)  - diet controlled    #BPH  -continue Flomax     # hypoalbuminemia  # moderate protein calorie malnutrition  - on nepro  - nutrition eval ongoing    Dispo: today after HD  d/w dr. desai in [facility]

## 2021-04-30 NOTE — PROGRESS NOTE ADULT - SUBJECTIVE AND OBJECTIVE BOX
Resting    Vital Signs Last 24 Hrs  T(C): 36.6 (04-30-21 @ 12:15), Max: 36.7 (04-30-21 @ 09:15)  T(F): 97.8 (04-30-21 @ 12:15), Max: 98 (04-30-21 @ 09:15)  HR: 63 (04-30-21 @ 16:46) (57 - 69)  BP: 130/48 (04-30-21 @ 16:46) (130/48 - 146/65)  RR: 16 (04-30-21 @ 16:46) (15 - 17)  SpO2: 99% (04-30-21 @ 16:46) (97% - 100%)    s1s2  b/l air entry  soft  tr edema b/l BARBRA KIM AVF patent                                                                                                                                                   10.4   6.36  )-----------( 184      ( 30 Apr 2021 09:15 )             32.6     30 Apr 2021 09:15    135    |  97     |  53     ----------------------------<  151    4.4     |  28     |  4.57     Ca    9.8        30 Apr 2021 09:15  Phos  3.8       30 Apr 2021 09:15    acetaminophen   Tablet .. 650 milliGRAM(s) Oral every 6 hours PRN  artificial  tears Solution 1 Drop(s) Both EYES two times a day PRN  clopidogrel Tablet 75 milliGRAM(s) Oral daily  dextrose 40% Gel 15 Gram(s) Oral once  dextrose 5%. 1000 milliLiter(s) IV Continuous <Continuous>  dextrose 5%. 1000 milliLiter(s) IV Continuous <Continuous>  dextrose 50% Injectable 25 Gram(s) IV Push once  dextrose 50% Injectable 12.5 Gram(s) IV Push once  dextrose 50% Injectable 25 Gram(s) IV Push once  diltiazem    milliGRAM(s) Oral daily  DULoxetine 60 milliGRAM(s) Oral daily  epoetin juan-epbx (RETACRIT) Injectable 87952 Unit(s) IV Push <User Schedule>  glucagon  Injectable 1 milliGRAM(s) IntraMuscular once  heparin   Injectable 5000 Unit(s) SubCutaneous every 8 hours  insulin lispro (ADMELOG) corrective regimen sliding scale   SubCutaneous two times a day with meals  isosorbide   mononitrate ER Tablet (IMDUR) 30 milliGRAM(s) Oral daily  melatonin 6 milliGRAM(s) Oral at bedtime  Nephro-pito 1 Tablet(s) Oral daily  pantoprazole    Tablet 40 milliGRAM(s) Oral two times a day  polyethylene glycol 3350 17 Gram(s) Oral daily  senna 2 Tablet(s) Oral at bedtime  tamsulosin 0.4 milliGRAM(s) Oral at bedtime    A/P:    S/p complicated hospital course as per HPI  ESRD on HD  HD MWF  TMP 2kg w/HD   Epogen  Renal diet    [phone]

## 2021-04-30 NOTE — PROGRESS NOTE ADULT - ATTENDING COMMENTS
Patient seen at bedside this am  Seated in chair and denies any complaints  Denies any  new pain.     Patient to have HD this am,   Session of PT this afternoon     Scheduled for discharge home today   Fu with PCP, Vascular surgery in 1 week  Case discussed with Vascular surgeon yesterday - Staples and sutures to be removed at followup next Tuesday   Medications to be reviewed with daughter and daughter aware of follow-up-

## 2021-04-30 NOTE — PROGRESS NOTE ADULT - PROVIDER SPECIALTY LIST ADULT
Hospitalist
Nephrology
Physiatry
Nephrology
Physiatry
Hospitalist
Nephrology
Physiatry
Hospitalist
Nephrology
Hospitalist
Physiatry
Rehab Medicine
Rehab Medicine
Physiatry
Hospitalist

## 2021-04-30 NOTE — PROGRESS NOTE ADULT - SUBJECTIVE AND OBJECTIVE BOX
Medicine Progress Note    Patient is a 86y old  Male who presents with a chief complaint of Impairment group -13 - PAD (28 Apr 2021 11:54)      SUBJECTIVE / OVERNIGHT EVENTS:  seen and examined  Chart reviewed  No overnight events  Limb weakness improving with therapy  BM+  No pain  for Dc today after HD      ADDITIONAL REVIEW OF SYSTEMS:  no fever/chills/CP/sob/palpitation/dizziness/ abd pain/nausea/vomiting/diarrhea/constipation/headaches. all other ROS neg    MEDICATIONS  (STANDING):  clopidogrel Tablet 75 milliGRAM(s) Oral daily  dextrose 40% Gel 15 Gram(s) Oral once  dextrose 5%. 1000 milliLiter(s) (50 mL/Hr) IV Continuous <Continuous>  dextrose 5%. 1000 milliLiter(s) (100 mL/Hr) IV Continuous <Continuous>  dextrose 50% Injectable 25 Gram(s) IV Push once  dextrose 50% Injectable 12.5 Gram(s) IV Push once  dextrose 50% Injectable 25 Gram(s) IV Push once  diltiazem    milliGRAM(s) Oral daily  DULoxetine 60 milliGRAM(s) Oral daily  epoetin juan-epbx (RETACRIT) Injectable 70335 Unit(s) IV Push <User Schedule>  glucagon  Injectable 1 milliGRAM(s) IntraMuscular once  heparin   Injectable 5000 Unit(s) SubCutaneous every 8 hours  insulin lispro (ADMELOG) corrective regimen sliding scale   SubCutaneous two times a day with meals  isosorbide   mononitrate ER Tablet (IMDUR) 30 milliGRAM(s) Oral daily  melatonin 6 milliGRAM(s) Oral at bedtime  Nephro-pito 1 Tablet(s) Oral daily  pantoprazole    Tablet 40 milliGRAM(s) Oral two times a day  polyethylene glycol 3350 17 Gram(s) Oral daily  senna 2 Tablet(s) Oral at bedtime  tamsulosin 0.4 milliGRAM(s) Oral at bedtime    MEDICATIONS  (PRN):  acetaminophen   Tablet .. 650 milliGRAM(s) Oral every 6 hours PRN Temp greater or equal to 38C (100.4F), Mild Pain (1 - 3)  artificial  tears Solution 1 Drop(s) Both EYES two times a day PRN Dry Eyes      PHYSICAL EXAM:    Vital Signs Last 24 Hrs  T(C): 36.7 (30 Apr 2021 09:15), Max: 36.7 (30 Apr 2021 09:15)  T(F): 98 (30 Apr 2021 09:15), Max: 98 (30 Apr 2021 09:15)  HR: 68 (30 Apr 2021 12:55) (58 - 69)  BP: 134/55 (30 Apr 2021 12:55) (134/55 - 146/65)  BP(mean): --  RR: 15 (30 Apr 2021 09:15) (15 - 15)  SpO2: 100% (30 Apr 2021 09:15) (97% - 100%)    CAPILLARY BLOOD GLUCOSE      POCT Blood Glucose.: 123 mg/dL (30 Apr 2021 07:31)  POCT Blood Glucose.: 206 mg/dL (29 Apr 2021 16:22)      GENERAL: Not in distress. Alert    HEENT:  MMM. AT/NC.   NECK: Supple.    CARDIOVASCULAR: RRR S1, S2. No murmur/rubs/gallop  LUNGS: BLAE+, no rales, no wheezing, no rhonchi.    ABDOMEN: ND. Soft,  NT, no guarding / rebound / rigidity.   EXTREMITIES: no edema. no leg or calf TP.  SKIN: warm and dry. heel and sacral decub as per primary team description  PSYCHIATRIC: Calm.  No agitation.    LABS:                                   10.4   6.36  )-----------( 184      ( 30 Apr 2021 09:15 )             32.6       04-30    135  |  97  |  53<H>  ----------------------------<  151<H>  4.4   |  28  |  4.57<H>    Ca    9.8      30 Apr 2021 09:15  Phos  3.8     04-30    TPro  6.4  /  Alb  2.4<L>  /  TBili  0.6  /  DBili  x   /  AST  18  /  ALT  15  /  AlkPhos  121<H>  04-28                COVID-19 PCR: NotDetec (22 Apr 2021 05:30)  COVID-19 PCR: NotDetec (19 Apr 2021 20:00)  COVID-19 PCR: NotDetec (15 Apr 2021 19:30)  COVID-19 PCR: NotDetec (10 Apr 2021 19:57)  COVID-19 PCR: NotDetec (07 Apr 2021 10:12)  COVID-19 PCR: NotDetec (03 Apr 2021 22:37)  COVID-19 PCR: NotDetec (28 Mar 2021 16:32)  COVID-19 PCR: NotDetec (24 Mar 2021 16:45)  COVID-19 PCR: NotDetec (19 Mar 2021 10:12)  COVID-19 PCR: NotDetec (16 Mar 2021 11:09)  COVID-19 PCR: NotDetec (13 Mar 2021 18:29)  SARS-CoV-2: NotDetec (12 Nov 2020 19:03)  SARS-CoV-2: NotDetec (12 Nov 2020 16:51)      RADIOLOGY & ADDITIONAL TESTS:  Imaging from Last 24 Hours:    Electrocardiogram/QTc Interval:    < from: TTE Echo Complete w/o Contrast w/ Doppler (03.14.21 @ 11:36) >  Summary:   1. Moderately enlarged left atrium.   2. There is mild concentric left ventricular hypertrophy.   3. Left ventricular ejection fraction, by visual estimation, is 60 to 65%.   4. Grade II diastolic dysfunction. Elevated mean left atrial pressure.   5. Mildly enlarged right atrium.   6. Mildly enlarged right ventricle. Mildly reduced RV systolic function.   7. Mild mitral stenosis. Moderate mitral valve regurgitation. Elevated mean gradient likely due to volume overload. Repeat study after diuresis to erevaluate mitral valve. If clinically indicated.   8. 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 Well seated valve. Acceptable gradients. No regurgitation.   9. Mild tricuspid regurgitation.  10. Estimated pulmonary artery systolic pressure is 78 mmHg - severe pulmonary hypertension.  11. There is no evidence of pericardial effusion.    < end of copied text >      COORDINATION OF CARE:  Care Discussed with Consultants/Other Providers:

## 2021-04-30 NOTE — PROGRESS NOTE ADULT - ATTENDING SUPERVISION STATEMENT
Resident
Resident
ACP
ACP/Resident
Resident
ACP
Resident
Resident

## 2021-04-30 NOTE — PROGRESS NOTE ADULT - NSICDXPILOT_GEN_ALL_CORE
[place]

## 2021-05-04 ENCOUNTER — APPOINTMENT (OUTPATIENT)
Dept: VASCULAR SURGERY | Facility: CLINIC | Age: 86
End: 2021-05-04
Payer: MEDICARE

## 2021-05-04 VITALS
HEART RATE: 61 BPM | TEMPERATURE: 97.3 F | DIASTOLIC BLOOD PRESSURE: 62 MMHG | SYSTOLIC BLOOD PRESSURE: 112 MMHG | OXYGEN SATURATION: 92 %

## 2021-05-04 PROCEDURE — 99213 OFFICE O/P EST LOW 20 MIN: CPT

## 2021-05-04 PROCEDURE — 99072 ADDL SUPL MATRL&STAF TM PHE: CPT

## 2021-05-04 RX ORDER — GABAPENTIN 100 MG/1
100 CAPSULE ORAL TWICE DAILY
Qty: 60 | Refills: 5 | Status: COMPLETED | COMMUNITY
Start: 2020-09-09 | End: 2021-05-04

## 2021-05-04 RX ORDER — TRAMADOL HYDROCHLORIDE 50 MG/1
50 TABLET, COATED ORAL TWICE DAILY
Qty: 28 | Refills: 1 | Status: COMPLETED | COMMUNITY
Start: 2020-11-10 | End: 2021-05-04

## 2021-05-04 RX ORDER — HYDRALAZINE HYDROCHLORIDE 50 MG/1
50 TABLET ORAL TWICE DAILY
Refills: 0 | Status: COMPLETED | COMMUNITY
End: 2021-05-04

## 2021-05-04 RX ORDER — LIDOCAINE AND PRILOCAINE 25; 25 MG/G; MG/G
2.5-2.5 CREAM TOPICAL
Qty: 30 | Refills: 5 | Status: COMPLETED | COMMUNITY
Start: 2020-06-26 | End: 2021-05-04

## 2021-05-04 RX ORDER — ATORVASTATIN CALCIUM 40 MG/1
40 TABLET, FILM COATED ORAL
Refills: 0 | Status: COMPLETED | COMMUNITY
Start: 2019-05-22 | End: 2021-05-04

## 2021-05-04 RX ORDER — TRAZODONE HYDROCHLORIDE 50 MG/1
50 TABLET ORAL AT BEDTIME
Qty: 45 | Refills: 1 | Status: COMPLETED | COMMUNITY
Start: 2020-08-03 | End: 2021-05-04

## 2021-05-04 RX ORDER — ISOSORBIDE DINITRATE 30 MG/1
30 TABLET ORAL
Refills: 0 | Status: COMPLETED | COMMUNITY
End: 2021-05-04

## 2021-05-04 RX ORDER — NIFEDIPINE 60 MG/1
60 TABLET, FILM COATED, EXTENDED RELEASE ORAL
Refills: 0 | Status: COMPLETED | COMMUNITY
Start: 2018-02-21 | End: 2021-05-04

## 2021-05-04 RX ORDER — TORSEMIDE 20 MG/1
20 TABLET ORAL
Qty: 90 | Refills: 1 | Status: COMPLETED | COMMUNITY
Start: 2016-05-25 | End: 2021-05-04

## 2021-05-04 RX ORDER — NIFEDIPINE 90 MG/1
90 TABLET, EXTENDED RELEASE ORAL
Qty: 90 | Refills: 0 | Status: COMPLETED | COMMUNITY
Start: 2019-05-30 | End: 2021-05-04

## 2021-05-05 ENCOUNTER — OUTPATIENT (OUTPATIENT)
Dept: OUTPATIENT SERVICES | Facility: HOSPITAL | Age: 86
LOS: 1 days | Discharge: HOME | End: 2021-05-05

## 2021-05-05 DIAGNOSIS — Z98.890 OTHER SPECIFIED POSTPROCEDURAL STATES: Chronic | ICD-10-CM

## 2021-05-05 DIAGNOSIS — I77.0 ARTERIOVENOUS FISTULA, ACQUIRED: Chronic | ICD-10-CM

## 2021-05-05 DIAGNOSIS — Z95.2 PRESENCE OF PROSTHETIC HEART VALVE: Chronic | ICD-10-CM

## 2021-05-05 DIAGNOSIS — Z98.62 PERIPHERAL VASCULAR ANGIOPLASTY STATUS: Chronic | ICD-10-CM

## 2021-05-05 DIAGNOSIS — D63.1 ANEMIA IN CHRONIC KIDNEY DISEASE: ICD-10-CM

## 2021-05-06 ENCOUNTER — APPOINTMENT (OUTPATIENT)
Age: 86
End: 2021-05-06

## 2021-05-06 ENCOUNTER — RESULT REVIEW (OUTPATIENT)
Age: 86
End: 2021-05-06

## 2021-05-06 LAB
BASOPHILS # BLD AUTO: 0.2 K/UL — SIGNIFICANT CHANGE UP (ref 0–0.2)
BASOPHILS NFR BLD AUTO: 2.1 % — HIGH (ref 0–2)
EOSINOPHIL # BLD AUTO: 0.6 K/UL — HIGH (ref 0–0.5)
EOSINOPHIL NFR BLD AUTO: 7.1 % — HIGH (ref 0–6)
HCT VFR BLD CALC: 30.4 % — LOW (ref 39–50)
HGB BLD-MCNC: 10 G/DL — LOW (ref 13–17)
LYMPHOCYTES # BLD AUTO: 0.6 K/UL — LOW (ref 1–3.3)
LYMPHOCYTES # BLD AUTO: 7.6 % — LOW (ref 13–44)
MCHC RBC-ENTMCNC: 31.6 PG — SIGNIFICANT CHANGE UP (ref 27–34)
MCHC RBC-ENTMCNC: 32.9 G/DL — SIGNIFICANT CHANGE UP (ref 32–36)
MCV RBC AUTO: 96.2 FL — SIGNIFICANT CHANGE UP (ref 80–100)
MONOCYTES # BLD AUTO: 0.8 K/UL — SIGNIFICANT CHANGE UP (ref 0–0.9)
MONOCYTES NFR BLD AUTO: 10 % — SIGNIFICANT CHANGE UP (ref 2–14)
NEUTROPHILS # BLD AUTO: 5.8 K/UL — SIGNIFICANT CHANGE UP (ref 1.8–7.4)
NEUTROPHILS NFR BLD AUTO: 73.2 % — SIGNIFICANT CHANGE UP (ref 43–77)
PLATELET # BLD AUTO: 232 K/UL — SIGNIFICANT CHANGE UP (ref 150–400)
RBC # BLD: 3.16 M/UL — LOW (ref 4.2–5.8)
RBC # FLD: 15.9 % — HIGH (ref 10.3–14.5)
WBC # BLD: 7.9 K/UL — SIGNIFICANT CHANGE UP (ref 3.8–10.5)
WBC # FLD AUTO: 7.9 K/UL — SIGNIFICANT CHANGE UP (ref 3.8–10.5)

## 2021-05-07 DIAGNOSIS — N18.4 CHRONIC KIDNEY DISEASE, STAGE 4 (SEVERE): ICD-10-CM

## 2021-05-11 ENCOUNTER — APPOINTMENT (OUTPATIENT)
Dept: VASCULAR SURGERY | Facility: CLINIC | Age: 86
End: 2021-05-11
Payer: MEDICARE

## 2021-05-11 VITALS
TEMPERATURE: 98.2 F | SYSTOLIC BLOOD PRESSURE: 117 MMHG | OXYGEN SATURATION: 94 % | HEART RATE: 71 BPM | DIASTOLIC BLOOD PRESSURE: 53 MMHG

## 2021-05-11 DIAGNOSIS — Z95.820 PERIPHERAL VASCULAR ANGIOPLASTY STATUS WITH IMPLANTS AND GRAFTS: ICD-10-CM

## 2021-05-11 PROCEDURE — 99024 POSTOP FOLLOW-UP VISIT: CPT

## 2021-05-13 ENCOUNTER — APPOINTMENT (OUTPATIENT)
Age: 86
End: 2021-05-13

## 2021-05-14 NOTE — ED PROVIDER NOTE - CROS ED ENMT ALL NEG
EMERGENCY DEPARTMENT HISTORY AND PHYSICAL EXAM      Date: 5/10/2021  Patient Name: Dionicio Farfan    History of Presenting Illness     Chief Complaint   Patient presents with    Chest Pain     Started at midnight and getting increasingly worse; patient with history of a-fib and tachycardia         HPI: Dionicio Farfan, 40 y.o. female presents to the ED with cc of chest pain. She describes a left-sided chest pain, started around midnight, she describes it as stabbing and constant, nonexertional.  No associated shortness of breath, nausea or diaphoresis. She reports chronic bilateral leg swelling without any recent change, no recent periods of immobilization, no unilateral leg pain. Reports a history of pleurisy in the past and states that this feels similar. There are no other complaints, changes, or physical findings at this time. PCP: Rudi Souza MD    No current facility-administered medications on file prior to encounter. Current Outpatient Medications on File Prior to Encounter   Medication Sig Dispense Refill    ALPRAZolam (XANAX) 1 mg tablet TAKE 1 TABLET BY MOUTH TWICE A DAY AS NEEDED FOR ANXIETY 60 Tab 0    ondansetron hcl (ZOFRAN) 4 mg tablet TAKE 1 TABLET BY MOUTH EVERY 8 HOURS AS NEEDED FOR NAUSEA 20 Tab 0    DULoxetine (CYMBALTA) 60 mg capsule 120 mg.      metoprolol succinate (TOPROL-XL) 25 mg XL tablet TAKE 1 TABLET BY MOUTH EVERY DAY AT NIGHT 30 Tab 2    naloxone (NARCAN) 4 mg/actuation nasal spray Use 1 spray intranasally, then discard. Repeat with new spray every 2 min as needed for opioid overdose symptoms, alternating nostrils. 2 Each 0    mirtazapine (REMERON) 30 mg tablet TAKE 1 TABLET BY MOUTH AT BEDTIME 30 Tab 5    mupirocin (BACTROBAN) 2 % ointment Apply  to affected area daily. 22 g 0    ondansetron (Zofran ODT) 4 mg disintegrating tablet Take 1 Tab by mouth every eight (8) hours as needed for Nausea.  10 Tab 0    ketorolac (TORADOL) 10 mg tablet Take 1 Tab by mouth every six (6) hours as needed for Pain. 20 Tab 0    olmesartan (BENICAR) 5 mg tablet TAKE 1 TABLET BY MOUTH DAILY 90 Tab 3    naproxen (NAPROSYN) 500 mg tablet TK 1 TA PO BID      RisaQuad-2 16 billion cell cap cap TAKE ONE CAPSULE BY MOUTH ONCE DAILY 30 Cap 1    albuterol (PROVENTIL HFA, VENTOLIN HFA, PROAIR HFA) 90 mcg/actuation inhaler Take 1 Puff by inhalation every six (6) hours as needed for Wheezing. 1 Inhaler 0    furosemide (LASIX) 20 mg tablet PRN  0    tiZANidine (ZANAFLEX) 4 mg tablet   0    estradiol (ESTRACE) 1 mg tablet Take 1 mg by mouth daily. 0    indomethacin (INDOCIN) 25 mg capsule PRN      pantoprazole (PROTONIX) 40 mg tablet take 1 tablet by mouth at bedtime  0    codeine-butalbital-acetaminophen-caffeine (FIORICET WITH CODEINE) -37-30 mg capsule take 2 capsules by mouth every 6 hours if needed for MIGRAINE HEADACHE 90 Cap 0    rizatriptan (MAXALT) 10 mg tablet Take 10 mg by mouth once as needed for Migraine. May repeat in 2 hours if needed      belimumab (Benlysta) 400 mg solr injection by IntraVENous route.          Past History     Past Medical History:  Past Medical History:   Diagnosis Date    Abnormal CT of the abdomen 11/8/2012    Asthma     Autoimmune disease (HCC)     lupus    C. difficile diarrhea     Cervical prolapse     Dehydration     Mariah-Danlos syndrome     Gastrointestinal disorder     gerd, twisted colon, IBS    GERD (gastroesophageal reflux disease)     Headache(784.0)     Lupus (HCC)     Morbid obesity (HCC)     Nausea & vomiting     Neurological disorder     cluster headaches    Occipital neuralgia     other 2006, 2009    ovarian cyst removal    Other ill-defined conditions(799.89)     Worsening headaches        Past Surgical History:  Past Surgical History:   Procedure Laterality Date    COLONOSCOPY  9/21/2010         HX CHOLECYSTECTOMY      HX CYST INCISION AND DRAINAGE Right 02/27/2020    HX GYN  1/2009    right salpingo oopherectomy    HX GYN  2006    right ovarian tumor removed    HX HEENT      left wisdom teeth removal    HX HEENT      top right wisdom tooth removed    HX HERNIA REPAIR  4/2012    HX HERNIA REPAIR  2/28/13    Laparoscopic recurrent incisional hernia repair    HX HYSTERECTOMY      2016    HX UROLOGICAL      Kidney Stone Removal    CA EGD TRANSORAL BIOPSY SINGLE/MULTIPLE  9/22/2010            Family History:  Family History   Problem Relation Age of Onset    Colon Cancer Maternal Grandfather        Social History:  Social History     Tobacco Use    Smoking status: Never Smoker    Smokeless tobacco: Never Used   Substance Use Topics    Alcohol use: No    Drug use: No       Allergies: Allergies   Allergen Reactions    Latex Itching     burning    Compazine [Prochlorperazine] Anxiety     High heart rate  Pt can take promethazine with no problems    Sulfa (Sulfonamide Antibiotics) Unknown (comments)    Topamax [Topiramate] Unknown (comments)    Adhesive Rash     Allergic to Dermabond    Clindamycin Diarrhea     Pt states caused her C-Diff    Mushroom Other (comments)    Mushroom Combination No.1 Unknown (comments)    Toradol [Ketorolac Tromethamine] Nausea and Vomiting and Other (comments)     PO & IV causes GI bleed  Tolerated during Feb 2020 stay    Valproic Acid Other (comments)     Elevated heart rate and vomiting           Review of Systems   no fever  No eye pain  No ear pain  no shortness of breath  Reports chest pain  no abdominal pain  no dysuria  no leg pain  No rash  No lymphadenopathy  No weight loss    Physical Exam   Physical Exam  Constitutional:       General: She is not in acute distress. HENT:      Head: Normocephalic. Eyes:      Extraocular Movements: Extraocular movements intact. Neck:      Musculoskeletal: Neck supple. Cardiovascular:      Rate and Rhythm: Normal rate and regular rhythm.    Pulmonary:      Effort: Pulmonary effort is normal.      Breath sounds: Normal breath sounds. Abdominal:      Palpations: Abdomen is soft. Tenderness: There is no abdominal tenderness. Musculoskeletal:      Right lower leg: She exhibits no tenderness. Left lower leg: She exhibits no tenderness. Skin:     General: Skin is warm and dry. Neurological:      General: No focal deficit present. Mental Status: She is alert and oriented to person, place, and time. Psychiatric:         Mood and Affect: Mood normal.         Diagnostic Study Results     Labs -   No results found for this or any previous visit (from the past 24 hour(s)). Radiologic Studies -   XR CHEST PORT   Final Result   No acute changes. CT Results  (Last 48 hours)    None        CXR Results  (Last 48 hours)    None            Medical Decision Making   I am the first provider for this patient. I reviewed the vital signs, available nursing notes, past medical history, past surgical history, family history and social history. Vital Signs-Reviewed the patient's vital signs. No data found. Provider Notes (Medical Decision Making):   80-year-old female presenting with chest pain. Her stabbing nonexertional chest pain is atypical for ACS, differential includes pleuritic pain, musculoskeletal pain, chest x-ray to assess for pneumothorax or infiltrate. Symptoms are not consistent with CHF, pneumonia, or any other cardiopulmonary emergency. She is low risk for PE.    ED Course:     Initial assessment performed. The patients presenting problems have been discussed, and they are in agreement with the care plan formulated and outlined with them. I have encouraged them to ask questions as they arise throughout their visit. CBC negative for leukocytosis or anemia, basic metabolic panel is unremarkable, dimer is negative, troponin is negative. Chest x-ray is unremarkable.     On reevaluation, patient is resting comfortably and states that they continue to have some pain but feel improved. Patient is counseled on supportive care and return precautions. Will return to the ED for any worsening pain, shortness of breath, new or worrisome symptoms. Will followup with primary care doctor within 5 days. Critical Care Time:         Disposition:  Home    PLAN:  1. Discharge Medication List as of 5/10/2021  1:04 PM      START taking these medications    Details   predniSONE (DELTASONE) 50 mg tablet Take 1 Tab by mouth daily for 3 days. , Normal, Disp-3 Tab, R-0         CONTINUE these medications which have NOT CHANGED    Details   ALPRAZolam (XANAX) 1 mg tablet TAKE 1 TABLET BY MOUTH TWICE A DAY AS NEEDED FOR ANXIETY, Normal, Disp-60 Tab, R-0Not to exceed 5 additional fills before 10/10/2021      ondansetron hcl (ZOFRAN) 4 mg tablet TAKE 1 TABLET BY MOUTH EVERY 8 HOURS AS NEEDED FOR NAUSEA, Normal, Disp-20 Tab, R-0      DULoxetine (CYMBALTA) 60 mg capsule 120 mg., Historical Med      metoprolol succinate (TOPROL-XL) 25 mg XL tablet TAKE 1 TABLET BY MOUTH EVERY DAY AT NIGHT, Normal, Disp-30 Tab, R-2      naloxone (NARCAN) 4 mg/actuation nasal spray Use 1 spray intranasally, then discard. Repeat with new spray every 2 min as needed for opioid overdose symptoms, alternating nostrils. , Normal, Disp-2 Each, R-0      mirtazapine (REMERON) 30 mg tablet TAKE 1 TABLET BY MOUTH AT BEDTIME, Normal, Disp-30 Tab, R-5      mupirocin (BACTROBAN) 2 % ointment Apply  to affected area daily. , Normal, Disp-22 g, R-0      ondansetron (Zofran ODT) 4 mg disintegrating tablet Take 1 Tab by mouth every eight (8) hours as needed for Nausea., Normal, Disp-10 Tab,R-0      ketorolac (TORADOL) 10 mg tablet Take 1 Tab by mouth every six (6) hours as needed for Pain., Normal, Disp-20 Tab,R-0      olmesartan (BENICAR) 5 mg tablet TAKE 1 TABLET BY MOUTH DAILY, Normal, Disp-90 Tab,R-3      naproxen (NAPROSYN) 500 mg tablet TK 1 TA PO BID, Historical Med      RisaQuad-2 16 billion cell cap cap TAKE ONE CAPSULE BY MOUTH ONCE DAILY, Normal, Disp-30 Cap, R-1      albuterol (PROVENTIL HFA, VENTOLIN HFA, PROAIR HFA) 90 mcg/actuation inhaler Take 1 Puff by inhalation every six (6) hours as needed for Wheezing., Normal, Disp-1 Inhaler, R-0      furosemide (LASIX) 20 mg tablet PRN, Historical Med, R-0      tiZANidine (ZANAFLEX) 4 mg tablet Historical Med, R-0      estradiol (ESTRACE) 1 mg tablet Take 1 mg by mouth daily. , Historical Med, R-0      indomethacin (INDOCIN) 25 mg capsule PRN, Historical Med      pantoprazole (PROTONIX) 40 mg tablet take 1 tablet by mouth at bedtime, Historical Med, R-0      codeine-butalbital-acetaminophen-caffeine (FIORICET WITH CODEINE) -78-30 mg capsule take 2 capsules by mouth every 6 hours if needed for MIGRAINE HEADACHE, Print, Disp-90 Cap, R-0      rizatriptan (MAXALT) 10 mg tablet Take 10 mg by mouth once as needed for Migraine. May repeat in 2 hours if needed, Historical Med      belimumab (Benlysta) 400 mg solr injection by IntraVENous route., Historical Med           2.    Follow-up Information     Follow up With Specialties Details Why Contact Info    Singh Cooper MD Internal Medicine   [id number] [facility]  [address] [address] [phone]      [facility] EMERGENCY DEPT Emergency Medicine  As needed, If symptoms worsen [address] [place] [address] Drive  [address]  [phone]        Return to ED if worse     Diagnosis     Clinical Impression: Acute atypical chest pain - - -

## 2021-05-20 ENCOUNTER — RESULT REVIEW (OUTPATIENT)
Age: 86
End: 2021-05-20

## 2021-05-20 ENCOUNTER — APPOINTMENT (OUTPATIENT)
Age: 86
End: 2021-05-20

## 2021-05-20 LAB
BASOPHILS # BLD AUTO: 0.1 K/UL — SIGNIFICANT CHANGE UP (ref 0–0.2)
BASOPHILS NFR BLD AUTO: 1.5 % — SIGNIFICANT CHANGE UP (ref 0–2)
EOSINOPHIL # BLD AUTO: 1.4 K/UL — HIGH (ref 0–0.5)
EOSINOPHIL NFR BLD AUTO: 16.2 % — HIGH (ref 0–6)
HCT VFR BLD CALC: 27.7 % — LOW (ref 39–50)
HGB BLD-MCNC: 9 G/DL — LOW (ref 13–17)
LYMPHOCYTES # BLD AUTO: 0.6 K/UL — LOW (ref 1–3.3)
LYMPHOCYTES # BLD AUTO: 6.7 % — LOW (ref 13–44)
MCHC RBC-ENTMCNC: 29.9 PG — SIGNIFICANT CHANGE UP (ref 27–34)
MCHC RBC-ENTMCNC: 32.6 G/DL — SIGNIFICANT CHANGE UP (ref 32–36)
MCV RBC AUTO: 91.7 FL — SIGNIFICANT CHANGE UP (ref 80–100)
MONOCYTES # BLD AUTO: 0.5 K/UL — SIGNIFICANT CHANGE UP (ref 0–0.9)
MONOCYTES NFR BLD AUTO: 5.9 % — SIGNIFICANT CHANGE UP (ref 2–14)
NEUTROPHILS # BLD AUTO: 5.8 K/UL — SIGNIFICANT CHANGE UP (ref 1.8–7.4)
NEUTROPHILS NFR BLD AUTO: 69.7 % — SIGNIFICANT CHANGE UP (ref 43–77)
PLATELET # BLD AUTO: 323 K/UL — SIGNIFICANT CHANGE UP (ref 150–400)
RBC # BLD: 3.02 M/UL — LOW (ref 4.2–5.8)
RBC # FLD: 15.2 % — HIGH (ref 10.3–14.5)
WBC # BLD: 8.4 K/UL — SIGNIFICANT CHANGE UP (ref 3.8–10.5)
WBC # FLD AUTO: 8.4 K/UL — SIGNIFICANT CHANGE UP (ref 3.8–10.5)

## 2021-05-24 ENCOUNTER — NON-APPOINTMENT (OUTPATIENT)
Age: 86
End: 2021-05-24

## 2021-05-24 ENCOUNTER — APPOINTMENT (OUTPATIENT)
Dept: ELECTROPHYSIOLOGY | Facility: CLINIC | Age: 86
End: 2021-05-24
Payer: MEDICARE

## 2021-05-24 PROCEDURE — 93298 REM INTERROG DEV EVAL SCRMS: CPT

## 2021-05-24 PROCEDURE — G2066: CPT

## 2021-05-26 ENCOUNTER — RX RENEWAL (OUTPATIENT)
Age: 86
End: 2021-05-26

## 2021-06-02 ENCOUNTER — NON-APPOINTMENT (OUTPATIENT)
Age: 86
End: 2021-06-02

## 2021-06-02 ENCOUNTER — INPATIENT (INPATIENT)
Facility: HOSPITAL | Age: 86
LOS: 18 days | Discharge: HOME | DRG: 299 | End: 2021-06-21
Attending: INTERNAL MEDICINE | Admitting: INTERNAL MEDICINE
Payer: MEDICARE

## 2021-06-02 VITALS
RESPIRATION RATE: 14 BRPM | HEART RATE: 79 BPM | OXYGEN SATURATION: 97 % | SYSTOLIC BLOOD PRESSURE: 179 MMHG | HEIGHT: 64 IN | DIASTOLIC BLOOD PRESSURE: 71 MMHG | TEMPERATURE: 98 F | WEIGHT: 147.05 LBS

## 2021-06-02 DIAGNOSIS — Z98.890 OTHER SPECIFIED POSTPROCEDURAL STATES: Chronic | ICD-10-CM

## 2021-06-02 DIAGNOSIS — Z95.2 PRESENCE OF PROSTHETIC HEART VALVE: Chronic | ICD-10-CM

## 2021-06-02 DIAGNOSIS — Z98.62 PERIPHERAL VASCULAR ANGIOPLASTY STATUS: Chronic | ICD-10-CM

## 2021-06-02 DIAGNOSIS — I77.0 ARTERIOVENOUS FISTULA, ACQUIRED: Chronic | ICD-10-CM

## 2021-06-02 DIAGNOSIS — I21.4 NON-ST ELEVATION (NSTEMI) MYOCARDIAL INFARCTION: ICD-10-CM

## 2021-06-02 LAB
ALBUMIN SERPL ELPH-MCNC: 3.4 G/DL — SIGNIFICANT CHANGE UP (ref 3.3–5.2)
ALP SERPL-CCNC: 114 U/L — SIGNIFICANT CHANGE UP (ref 40–120)
ALT FLD-CCNC: 60 U/L — HIGH
ANION GAP SERPL CALC-SCNC: 16 MMOL/L — SIGNIFICANT CHANGE UP (ref 5–17)
APTT BLD: 33.2 SEC — SIGNIFICANT CHANGE UP (ref 27.5–35.5)
AST SERPL-CCNC: 134 U/L — HIGH
BASE EXCESS BLDV CALC-SCNC: 4.6 MMOL/L — HIGH (ref -2–2)
BASOPHILS # BLD AUTO: 0.14 K/UL — SIGNIFICANT CHANGE UP (ref 0–0.2)
BASOPHILS NFR BLD AUTO: 1.8 % — SIGNIFICANT CHANGE UP (ref 0–2)
BILIRUB SERPL-MCNC: 0.5 MG/DL — SIGNIFICANT CHANGE UP (ref 0.4–2)
BLD GP AB SCN SERPL QL: SIGNIFICANT CHANGE UP
BUN SERPL-MCNC: 35 MG/DL — HIGH (ref 8–20)
CALCIUM SERPL-MCNC: 9.3 MG/DL — SIGNIFICANT CHANGE UP (ref 8.6–10.2)
CHLORIDE SERPL-SCNC: 94 MMOL/L — LOW (ref 98–107)
CO2 SERPL-SCNC: 28 MMOL/L — SIGNIFICANT CHANGE UP (ref 22–29)
CREAT SERPL-MCNC: 4.33 MG/DL — HIGH (ref 0.5–1.3)
EOSINOPHIL # BLD AUTO: 0.4 K/UL — SIGNIFICANT CHANGE UP (ref 0–0.5)
EOSINOPHIL NFR BLD AUTO: 5 % — SIGNIFICANT CHANGE UP (ref 0–6)
GAS PNL BLDV: SIGNIFICANT CHANGE UP
GLUCOSE SERPL-MCNC: 139 MG/DL — HIGH (ref 70–99)
HCO3 BLDV-SCNC: 28 MMOL/L — HIGH (ref 20–26)
HCT VFR BLD CALC: 32.7 % — LOW (ref 39–50)
HGB BLD-MCNC: 10 G/DL — LOW (ref 13–17)
IMM GRANULOCYTES NFR BLD AUTO: 0.4 % — SIGNIFICANT CHANGE UP (ref 0–1.5)
INR BLD: 1.15 RATIO — SIGNIFICANT CHANGE UP (ref 0.88–1.16)
LACTATE BLDV-MCNC: 1.5 MMOL/L — SIGNIFICANT CHANGE UP (ref 0.5–2)
LYMPHOCYTES # BLD AUTO: 0.58 K/UL — LOW (ref 1–3.3)
LYMPHOCYTES # BLD AUTO: 7.3 % — LOW (ref 13–44)
MCHC RBC-ENTMCNC: 29.1 PG — SIGNIFICANT CHANGE UP (ref 27–34)
MCHC RBC-ENTMCNC: 30.6 GM/DL — LOW (ref 32–36)
MCV RBC AUTO: 95.1 FL — SIGNIFICANT CHANGE UP (ref 80–100)
MONOCYTES # BLD AUTO: 0.46 K/UL — SIGNIFICANT CHANGE UP (ref 0–0.9)
MONOCYTES NFR BLD AUTO: 5.8 % — SIGNIFICANT CHANGE UP (ref 2–14)
NEUTROPHILS # BLD AUTO: 6.32 K/UL — SIGNIFICANT CHANGE UP (ref 1.8–7.4)
NEUTROPHILS NFR BLD AUTO: 79.7 % — HIGH (ref 43–77)
PCO2 BLDV: 48 MMHG — SIGNIFICANT CHANGE UP (ref 35–50)
PH BLDV: 7.4 — SIGNIFICANT CHANGE UP (ref 7.32–7.43)
PLATELET # BLD AUTO: 271 K/UL — SIGNIFICANT CHANGE UP (ref 150–400)
PO2 BLDV: 70 MMHG — HIGH (ref 25–45)
POTASSIUM SERPL-MCNC: 4.1 MMOL/L — SIGNIFICANT CHANGE UP (ref 3.5–5.3)
POTASSIUM SERPL-SCNC: 4.1 MMOL/L — SIGNIFICANT CHANGE UP (ref 3.5–5.3)
PROT SERPL-MCNC: 6.8 G/DL — SIGNIFICANT CHANGE UP (ref 6.6–8.7)
PROTHROM AB SERPL-ACNC: 13.2 SEC — SIGNIFICANT CHANGE UP (ref 10.6–13.6)
RBC # BLD: 3.44 M/UL — LOW (ref 4.2–5.8)
RBC # FLD: 18.7 % — HIGH (ref 10.3–14.5)
SAO2 % BLDV: 92 % — SIGNIFICANT CHANGE UP
SARS-COV-2 RNA SPEC QL NAA+PROBE: SIGNIFICANT CHANGE UP
SODIUM SERPL-SCNC: 138 MMOL/L — SIGNIFICANT CHANGE UP (ref 135–145)
TROPONIN T SERPL-MCNC: 0.51 NG/ML — HIGH (ref 0–0.06)
TROPONIN T SERPL-MCNC: 0.58 NG/ML — HIGH (ref 0–0.06)
WBC # BLD: 7.93 K/UL — SIGNIFICANT CHANGE UP (ref 3.8–10.5)
WBC # FLD AUTO: 7.93 K/UL — SIGNIFICANT CHANGE UP (ref 3.8–10.5)

## 2021-06-02 PROCEDURE — 71045 X-RAY EXAM CHEST 1 VIEW: CPT | Mod: 26

## 2021-06-02 PROCEDURE — 99233 SBSQ HOSP IP/OBS HIGH 50: CPT

## 2021-06-02 PROCEDURE — 99222 1ST HOSP IP/OBS MODERATE 55: CPT

## 2021-06-02 PROCEDURE — 93010 ELECTROCARDIOGRAM REPORT: CPT

## 2021-06-02 PROCEDURE — 70450 CT HEAD/BRAIN W/O DYE: CPT | Mod: 26,MA

## 2021-06-02 PROCEDURE — 99285 EMERGENCY DEPT VISIT HI MDM: CPT

## 2021-06-02 PROCEDURE — 99223 1ST HOSP IP/OBS HIGH 75: CPT

## 2021-06-02 PROCEDURE — 93306 TTE W/DOPPLER COMPLETE: CPT | Mod: 26

## 2021-06-02 RX ORDER — ATORVASTATIN CALCIUM 80 MG/1
80 TABLET, FILM COATED ORAL AT BEDTIME
Refills: 0 | Status: DISCONTINUED | OUTPATIENT
Start: 2021-06-02 | End: 2021-06-21

## 2021-06-02 RX ORDER — DULOXETINE HYDROCHLORIDE 30 MG/1
60 CAPSULE, DELAYED RELEASE ORAL DAILY
Refills: 0 | Status: DISCONTINUED | OUTPATIENT
Start: 2021-06-02 | End: 2021-06-13

## 2021-06-02 RX ORDER — CLOPIDOGREL BISULFATE 75 MG/1
75 TABLET, FILM COATED ORAL DAILY
Refills: 0 | Status: COMPLETED | OUTPATIENT
Start: 2021-06-02 | End: 2021-06-08

## 2021-06-02 RX ORDER — NIFEDIPINE 30 MG
90 TABLET, EXTENDED RELEASE 24 HR ORAL DAILY
Refills: 0 | Status: DISCONTINUED | OUTPATIENT
Start: 2021-06-02 | End: 2021-06-21

## 2021-06-02 RX ORDER — ISOSORBIDE MONONITRATE 60 MG/1
30 TABLET, EXTENDED RELEASE ORAL DAILY
Refills: 0 | Status: DISCONTINUED | OUTPATIENT
Start: 2021-06-02 | End: 2021-06-21

## 2021-06-02 RX ORDER — PANTOPRAZOLE SODIUM 20 MG/1
40 TABLET, DELAYED RELEASE ORAL
Refills: 0 | Status: DISCONTINUED | OUTPATIENT
Start: 2021-06-02 | End: 2021-06-21

## 2021-06-02 RX ORDER — ERYTHROPOIETIN 10000 [IU]/ML
10000 INJECTION, SOLUTION INTRAVENOUS; SUBCUTANEOUS
Refills: 0 | Status: DISCONTINUED | OUTPATIENT
Start: 2021-06-02 | End: 2021-06-11

## 2021-06-02 RX ORDER — HEPARIN SODIUM 5000 [USP'U]/ML
5000 INJECTION INTRAVENOUS; SUBCUTANEOUS EVERY 12 HOURS
Refills: 0 | Status: DISCONTINUED | OUTPATIENT
Start: 2021-06-02 | End: 2021-06-03

## 2021-06-02 RX ORDER — NIFEDIPINE 30 MG
60 TABLET, EXTENDED RELEASE 24 HR ORAL AT BEDTIME
Refills: 0 | Status: DISCONTINUED | OUTPATIENT
Start: 2021-06-02 | End: 2021-06-21

## 2021-06-02 RX ORDER — TAMSULOSIN HYDROCHLORIDE 0.4 MG/1
0.4 CAPSULE ORAL AT BEDTIME
Refills: 0 | Status: DISCONTINUED | OUTPATIENT
Start: 2021-06-02 | End: 2021-06-21

## 2021-06-02 RX ADMIN — ATORVASTATIN CALCIUM 80 MILLIGRAM(S): 80 TABLET, FILM COATED ORAL at 23:07

## 2021-06-02 RX ADMIN — TAMSULOSIN HYDROCHLORIDE 0.4 MILLIGRAM(S): 0.4 CAPSULE ORAL at 23:07

## 2021-06-02 RX ADMIN — HEPARIN SODIUM 5000 UNIT(S): 5000 INJECTION INTRAVENOUS; SUBCUTANEOUS at 18:41

## 2021-06-02 RX ADMIN — ERYTHROPOIETIN 10000 UNIT(S): 10000 INJECTION, SOLUTION INTRAVENOUS; SUBCUTANEOUS at 18:42

## 2021-06-02 RX ADMIN — Medication 60 MILLIGRAM(S): at 23:05

## 2021-06-02 RX ADMIN — PANTOPRAZOLE SODIUM 40 MILLIGRAM(S): 20 TABLET, DELAYED RELEASE ORAL at 18:42

## 2021-06-02 NOTE — H&P ADULT - NSHPPHYSICALEXAM_GEN_ALL_CORE
PHYSICAL EXAM:  Vital Signs Last 24 Hrs  T(C): 36.4 (02 Jun 2021 12:00), Max: 36.4 (02 Jun 2021 12:00)  T(F): 97.5 (02 Jun 2021 12:00), Max: 97.5 (02 Jun 2021 12:00)  HR: 79 (02 Jun 2021 12:00) (79 - 79)  BP: 179/71 (02 Jun 2021 12:00) (179/71 - 179/71)  BP(mean): --  RR: 14 (02 Jun 2021 12:00) (14 - 14)  SpO2: 97% (02 Jun 2021 12:00) (97% - 97%)    GENERAL: NAD, well-groomed, well-developed  HEAD:  Atraumatic, Normocephalic  EYES: EOMI, PERRLA, conjunctiva and sclera clear  ENMT: No tonsillar erythema, exudates, or enlargement; Moist mucous membranes  NERVOUS SYSTEM:  Alert & Oriented X3, Good concentration; Motor Strength 5/5 B/L upper and lower extremities  CHEST/LUNG: Clear to auscultation bilaterally; No rales, rhonchi, wheezing  HEART: Regular rate and rhythm; No murmurs  ABDOMEN: Soft, Nontender, Nondistended; Bowel sounds present  EXTREMITIES:  2+ Peripheral Pulses, No clubbing, cyanosis, positive LE edema

## 2021-06-02 NOTE — CONSULT NOTE ADULT - SUBJECTIVE AND OBJECTIVE BOX
[facility]                                     Neurology at [place]                                 Ligia Sorenson, & [address]                                  [address]                                        [place], [place], [address]                                             [phone]    CC: Gait difficulty  HPI:  The patient is a 87y Male who presented with difficulty walking for 3 days.  He has history of multiple surgeries for PAD on his lower extremities, in March he had left fem-pop bypass complicated by DVT and went to rehab.  He was able to walk about 15-20 feet with a walker until it was too painful and he had to stop.  For the past three days he has noticed worsening b/l foot numbness with difficulty walking and dragging his feet.  Neurology is asked to evaluate.    PAST MEDICAL & SURGICAL HISTORY:  DM (diabetes mellitus)  - resolved    AV block, 1st degree    Arrhythmia    CAD (coronary artery disease)    HTN (hypertension)    VT (ventricular tachycardia)    RICHARDS (dyspnea on exertion)    Anemia    Risk factors for obstructive sleep apnea    ESRD on dialysis  HD on Mondays and Fridays    Aortic stenosis  - s/p valve replacement    GI bleeding  due to ulcerated polyps and colonic AVMs    H/O circumcision  at  age  65    H/O left knee surgery    H/O angioplasty  2013,  no  intervention    A-V fistula  left arm 5/2017    H/O carotid endarterectomy  Right    S/P TAVR (transcatheter aortic valve replacement)    History of loop recorder        MEDICATIONS  (STANDING):  atorvastatin 80 milliGRAM(s) Oral at bedtime  clopidogrel Tablet 75 milliGRAM(s) Oral daily  DULoxetine 60 milliGRAM(s) Oral daily  epoetin juan-epbx (RETACRIT) Injectable 56912 Unit(s) SubCutaneous <User Schedule>  heparin   Injectable 5000 Unit(s) SubCutaneous every 12 hours  isosorbide   mononitrate ER Tablet (IMDUR) 30 milliGRAM(s) Oral daily  NIFEdipine XL 90 milliGRAM(s) Oral daily  NIFEdipine XL 60 milliGRAM(s) Oral at bedtime  pantoprazole    Tablet 40 milliGRAM(s) Oral two times a day  tamsulosin 0.4 milliGRAM(s) Oral at bedtime    MEDICATIONS  (PRN):      Allergies    Plavix (Hives)  Toprol-XL (Rash)    Intolerances        SOCIAL HISTORY:  no tob,   no alcohol   no drugs    FAMILY HISTORY:  Family history of cancer (Grandparent)    Family history of lung cancer (Grandparent)    Family history of premature CAD    FH: type 2 diabetes      ROS: 14 point ROS negative other than what is present in HPI or below  left>right leg weakness    Vital Signs Last 24 Hrs  T(C): 36.4 (02 Jun 2021 12:00), Max: 36.4 (02 Jun 2021 12:00)  T(F): 97.5 (02 Jun 2021 12:00), Max: 97.5 (02 Jun 2021 12:00)  HR: 79 (02 Jun 2021 12:00) (79 - 79)  BP: 179/71 (02 Jun 2021 12:00) (179/71 - 179/71)  BP(mean): --  RR: 14 (02 Jun 2021 12:00) (14 - 14)  SpO2: 97% (02 Jun 2021 12:00) (97% - 97%)      General: NAD    Detailed Neurologic Exam:    Mental status: The patient is awake and alert and has normal attention span.  The patient is fully oriented.  The patient is able to name objects, follow commands, repeat sentences.    Cranial nerves: Pupils equal and react symmetrically to light. There is no visual field deficit to confrontation. Extraocular motion is full with no nystagmus. There is no ptosis. Facial sensation is intact. Facial musculature is symmetric. Palate elevates symmetrically.  Tongue is midline.    Motor: There is normal bulk and tone.  There is no tremor.  Strength is 5/5 in the right arm and 4+/5leg.   Strength is 5/5 in the left arm and 4-/5 leg.    Sensation: Intact to light touch and pin in 4 extremities, except for decreased at both feet    Reflexes: 1+ throughout and plantar responses are equivocal.    Cerebellar: There is no dysmetria on finger to nose testing.    Gait : deferred    LABS:                         10.0   7.93  )-----------( 271      ( 02 Jun 2021 12:33 )             32.7       06-02    138  |  94<L>  |  35.0<H>  ----------------------------<  139<H>  4.1   |  28.0  |  4.33<H>    Ca    9.3      02 Jun 2021 12:33    TPro  6.8  /  Alb  3.4  /  TBili  0.5  /  DBili  x   /  AST  134<H>  /  ALT  60<H>  /  AlkPhos  114  06-02      PT/INR - ( 02 Jun 2021 12:33 )   PT: 13.2 sec;   INR: 1.15 ratio         PTT - ( 02 Jun 2021 12:33 )  PTT:33.2 sec    RADIOLOGY & ADDITIONAL STUDIES (independently reviewed unless otherwise noted):  Head CT no acute CVA, mass or blood

## 2021-06-02 NOTE — ED ADULT NURSE REASSESSMENT NOTE - NS ED NURSE REASSESS COMMENT FT1
As per YRN Mccarty , MRI is not present on shift and will be called in by management to address the outstanding MRI head. Patient will have MRI as soon as staff is available. Report given to Marcia., patient will be placed for transport.

## 2021-06-02 NOTE — PROCEDURE NOTE - INTERROGATION NOTE: MODEL
Reveal Linq [id number] implanted by Dr. Johns 6/10/2020
Micra AV [id number] implanted by Dr. Palma on 10/29/2020

## 2021-06-02 NOTE — CONSULT NOTE ADULT - ASSESSMENT
ESRD - HD ( BIW ), HTN, CAD    Anemia - on AMY,    Long Standing DM- 2 w. Neuropathy, PAD , ? TIA,    S/P Recent Revascularization - LLE,    GI Bleed, ( AVM )    ( Extremely Deconditioned, Unable to stand, Pivot Or Ambulate,)      Rec : Medical & Cardiology Evalns.,     HD in AM,  To Avoid IV Contrast Unless absolutely indicated * Patient is non Oliguric & is Maintained on BIW HD

## 2021-06-02 NOTE — ED ADULT NURSE NOTE - OBJECTIVE STATEMENT
weakness of lower extremities  x  3 days. family and patient states he walks with walker and has been having increased difficulty with ambulation.

## 2021-06-02 NOTE — CONSULT NOTE ADULT - SUBJECTIVE AND OBJECTIVE BOX
[facility]                                   [address] [place] [address]                                                      PHONE: [phone]                                                         FAX: [phone]  [email or web address]  ---------------------------------------------------------------------------------------------------------------------------------    Reason for Consult: elevated trop    HPI:  CHRISTIANO ELIZABETH is an 87y Male with HTN ESRD on HD, non obst CAD, normal EF s/p TAVR PAF not candidate for AC due to GIC, leadless pacer in place, s/p ILR for monitoring, PVD s/p LE revasc came to ER with slurred speach.  Cardiac consult called for the above and elevated trop.    PAST MEDICAL & SURGICAL HISTORY:  DM (diabetes mellitus)  - resolved    AV block, 1st degree    Arrhythmia    CAD (coronary artery disease)    HTN (hypertension)    VT (ventricular tachycardia)    RICHARDS (dyspnea on exertion)    Anemia    Risk factors for obstructive sleep apnea    ESRD on dialysis  HD on Mondays and Fridays    Aortic stenosis  - s/p valve replacement    GI bleeding  due to ulcerated polyps and colonic AVMs    H/O circumcision  at  age  65    H/O left knee surgery    H/O angioplasty  2013,  no  intervention    A-V fistula  left arm 5/2017    H/O carotid endarterectomy  Right    S/P TAVR (transcatheter aortic valve replacement)    History of loop recorder        Plavix (Hives)  Toprol-XL (Rash)      MEDICATIONS  (STANDING):  atorvastatin 80 milliGRAM(s) Oral at bedtime  clopidogrel Tablet 75 milliGRAM(s) Oral daily  DULoxetine 60 milliGRAM(s) Oral daily  epoetin juan-epbx (RETACRIT) Injectable 15327 Unit(s) SubCutaneous <User Schedule>  heparin   Injectable 5000 Unit(s) SubCutaneous every 12 hours  isosorbide   mononitrate ER Tablet (IMDUR) 30 milliGRAM(s) Oral daily  NIFEdipine XL 90 milliGRAM(s) Oral daily  NIFEdipine XL 60 milliGRAM(s) Oral at bedtime  pantoprazole    Tablet 40 milliGRAM(s) Oral two times a day  tamsulosin 0.4 milliGRAM(s) Oral at bedtime    MEDICATIONS  (PRN):      Social History:  Cigarettes:          neg          Alchohol:                 Illicit Drug Abuse:  neg  neg FH  ROS: Negative other than as mentioned in HPI.    Vital Signs Last 24 Hrs  T(C): 36.4 (02 Jun 2021 12:00), Max: 36.4 (02 Jun 2021 12:00)  T(F): 97.5 (02 Jun 2021 12:00), Max: 97.5 (02 Jun 2021 12:00)  HR: 79 (02 Jun 2021 12:00) (79 - 79)  BP: 179/71 (02 Jun 2021 12:00) (179/71 - 179/71)  BP(mean): --  RR: 14 (02 Jun 2021 12:00) (14 - 14)  SpO2: 97% (02 Jun 2021 12:00) (97% - 97%)  ICU Vital Signs Last 24 Hrs  CHRISTIANO ELIZABETH  I&O's Detail    I&O's Summary    Drug Dosing Weight  CHRISTIANO ELIZABETH      PHYSICAL EXAM:  General: Appears well developed, well nourished alert and cooperative.  HEENT: Head; normocephalic, atraumatic.  Eyes: Pupils reactive, cornea wnl.  Neck: Supple, no nodes adenopathy, no NVD or carotid bruit or thyromegaly.  CARDIOVASCULAR: Normal S1 and S2, No murmur, rub, gallop or lift.   LUNGS: No rales, rhonchi or wheeze. Normal breath sounds bilaterally.  ABDOMEN: Soft, nontender without mass or organomegaly. bowel sounds normoactive.  EXTREMITIES: No clubbing, cyanosis or edema. Distal pulses wnl.   SKIN: warm and dry with normal turgor.  NEURO: Alert/oriented x 3/normal motor exam. No pathologic reflexes.    PSYCH: normal affect.        LABS:                        10.0   7.93  )-----------( 271      ( 02 Jun 2021 12:33 )             32.7     06-02    138  |  94<L>  |  35.0<H>  ----------------------------<  139<H>  4.1   |  28.0  |  4.33<H>    Ca    9.3      02 Jun 2021 12:33    TPro  6.8  /  Alb  3.4  /  TBili  0.5  /  DBili  x   /  AST  134<H>  /  ALT  60<H>  /  AlkPhos  114  06-02    CHRISTIANO ELIZABETH  CARDIAC MARKERS ( 02 Jun 2021 12:33 )  x     / 0.58 ng/mL / x     / x     / x          PT/INR - ( 02 Jun 2021 12:33 )   PT: 13.2 sec;   INR: 1.15 ratio         PTT - ( 02 Jun 2021 12:33 )  PTT:33.2 sec      RADIOLOGY & ADDITIONAL STUDIES:    INTERPRETATION OF TELEMETRY (personally reviewed):    < from: TTE Echo Complete w/o Contrast w/ Doppler (03.14.21 @ 11:36) >  Summary:   1. Moderately enlarged left atrium.   2. There is mild concentric left ventricular hypertrophy.   3. Left ventricular ejection fraction, by visual estimation, is 60 to 65%.   4. Grade II diastolic dysfunction. Elevated mean left atrial pressure.   5. Mildly enlarged right atrium.   6. Mildly enlarged right ventricle. Mildly reduced RV systolic function.   7. Mild mitral stenosis. Moderate mitral valve regurgitation. Elevated mean gradient likely due to volume overload. Repeat study after diuresis to erevaluate mitral valve. If clinically indicated.   8. 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 Well seated valve. Acceptable gradients. No regurgitation.   9. Mild tricuspid regurgitation.  10. Estimated pulmonary artery systolic pressure is 78 mmHg - severe pulmonary hypertension.  11. There is no evidence of pericardial effusion.    MD Rohith, RPVI Electronically signed on 3/14/2021 at 6:02:57 PM            *** Final ***            < end of copied text >  ECG: reviewed SR 1st degree Av block PRWP    ECHO: < from: TTE Echo Complete w/o Contrast w/ Doppler (03.14.21 @ 11:36) >      < end of copied text >                       [facility], [facility]                                   [address] [place] [address]                                                      PHONE: [phone]                                                         FAX: [phone]  [email or web address]  ---------------------------------------------------------------------------------------------------------------------------------    Reason for Consult: elevated trop    HPI:  CHRISTIANO ELIZABETH is an 87y Male with HTN ESRD on HD, non obst CAD, normal EF s/p TAVR PAF not candidate for AC due to GIC, leadless pacer in place, s/p ILR for monitoring, PVD s/p LE revasc came to ER with generalized weakness and ? slurred speech over the past 3 days.  Despite rehab pt has had progressive functional decline with inability to weight bear independently.  Cardiac consult called for the above and elevated trop.  No assoc chest pain or palps, lying flat comfortably in ER, conversant without slurred speech.    PAST MEDICAL & SURGICAL HISTORY:  DM (diabetes mellitus)  - resolved    AV block, 1st degree    Arrhythmia    CAD (coronary artery disease)    HTN (hypertension)    VT (ventricular tachycardia)    RICHARDS (dyspnea on exertion)    Anemia    Risk factors for obstructive sleep apnea    ESRD on dialysis  HD on Mondays and Fridays    Aortic stenosis  - s/p valve replacement    GI bleeding  due to ulcerated polyps and colonic AVMs    H/O circumcision  at  age  65    H/O left knee surgery    H/O angioplasty  2013,  no  intervention    A-V fistula  left arm 5/2017    H/O carotid endarterectomy  Right    S/P TAVR (transcatheter aortic valve replacement)    History of loop recorder        Plavix (Hives)  Toprol-XL (Rash)      MEDICATIONS  (STANDING):  atorvastatin 80 milliGRAM(s) Oral at bedtime  clopidogrel Tablet 75 milliGRAM(s) Oral daily  DULoxetine 60 milliGRAM(s) Oral daily  epoetin juan-epbx (RETACRIT) Injectable 53423 Unit(s) SubCutaneous <User Schedule>  heparin   Injectable 5000 Unit(s) SubCutaneous every 12 hours  isosorbide   mononitrate ER Tablet (IMDUR) 30 milliGRAM(s) Oral daily  NIFEdipine XL 90 milliGRAM(s) Oral daily  NIFEdipine XL 60 milliGRAM(s) Oral at bedtime  pantoprazole    Tablet 40 milliGRAM(s) Oral two times a day  tamsulosin 0.4 milliGRAM(s) Oral at bedtime    MEDICATIONS  (PRN):      Social History:  Cigarettes:          neg          Alchohol:                 Illicit Drug Abuse:  neg  neg FH  ROS: Negative other than as mentioned in HPI.    Vital Signs Last 24 Hrs  T(C): 36.4 (02 Jun 2021 12:00), Max: 36.4 (02 Jun 2021 12:00)  T(F): 97.5 (02 Jun 2021 12:00), Max: 97.5 (02 Jun 2021 12:00)  HR: 79 (02 Jun 2021 12:00) (79 - 79)  BP: 179/71 (02 Jun 2021 12:00) (179/71 - 179/71)  BP(mean): --  RR: 14 (02 Jun 2021 12:00) (14 - 14)  SpO2: 97% (02 Jun 2021 12:00) (97% - 97%)  ICU Vital Signs Last 24 Hrs  CHRISTIANO ELIZABETH  I&O's Detail    I&O's Summary    Drug Dosing Weight  CHRISTIANO ELIZABETH      PHYSICAL EXAM:  General: Appears well developed, well nourished alert and cooperative.  HEENT: Head; normocephalic, atraumatic.  Eyes: Pupils reactive, cornea wnl.  Neck: Supple, no nodes adenopathy, no NVD or carotid bruit or thyromegaly.  CARDIOVASCULAR: Normal S1 and S2, No murmur, rub, gallop or lift.   LUNGS: No rales, rhonchi or wheeze. Normal breath sounds bilaterally.  ABDOMEN: Soft, nontender without mass or organomegaly. bowel sounds normoactive.  EXTREMITIES: No clubbing, cyanosis or edema. Distal pulses wnl.   SKIN: warm and dry with normal turgor.  NEURO: Alert/oriented x 3/normal motor exam. No pathologic reflexes.    PSYCH: normal affect.        LABS:                        10.0   7.93  )-----------( 271      ( 02 Jun 2021 12:33 )             32.7     06-02    138  |  94<L>  |  35.0<H>  ----------------------------<  139<H>  4.1   |  28.0  |  4.33<H>    Ca    9.3      02 Jun 2021 12:33    TPro  6.8  /  Alb  3.4  /  TBili  0.5  /  DBili  x   /  AST  134<H>  /  ALT  60<H>  /  AlkPhos  114  06-02    CHRISTIANO ELIZABETH  CARDIAC MARKERS ( 02 Jun 2021 12:33 )  x     / 0.58 ng/mL / x     / x     / x          PT/INR - ( 02 Jun 2021 12:33 )   PT: 13.2 sec;   INR: 1.15 ratio         PTT - ( 02 Jun 2021 12:33 )  PTT:33.2 sec      RADIOLOGY & ADDITIONAL STUDIES:    INTERPRETATION OF TELEMETRY (personally reviewed):    < from: TTE Echo Complete w/o Contrast w/ Doppler (03.14.21 @ 11:36) >  Summary:   1. Moderately enlarged left atrium.   2. There is mild concentric left ventricular hypertrophy.   3. Left ventricular ejection fraction, by visual estimation, is 60 to 65%.   4. Grade II diastolic dysfunction. Elevated mean left atrial pressure.   5. Mildly enlarged right atrium.   6. Mildly enlarged right ventricle. Mildly reduced RV systolic function.   7. Mild mitral stenosis. Moderate mitral valve regurgitation. Elevated mean gradient likely due to volume overload. Repeat study after diuresis to erevaluate mitral valve. If clinically indicated.   8. 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 Well seated valve. Acceptable gradients. No regurgitation.   9. Mild tricuspid regurgitation.  10. Estimated pulmonary artery systolic pressure is 78 mmHg - severe pulmonary hypertension.  11. There is no evidence of pericardial effusion.    MD Rohith, RPVI Electronically signed on 3/14/2021 at 6:02:57 PM            *** Final ***            < end of copied text >  ECG: reviewed SR 1st degree Av block PRWP    ECHO: < from: TTE Echo Complete w/o Contrast w/ Doppler (03.14.21 @ 11:36) >      < end of copied text >

## 2021-06-02 NOTE — ED ADULT TRIAGE NOTE - CHIEF COMPLAINT QUOTE
Patient A&Ox4, denies any pain or discomfort. Daughter at side reports patient has had an increase in weakness & immobility x3 days. Also reports intermittent speech "sounding like he has been drinking". Stated patient fell x1 week ago, denies hitting head. Dialysis M & F, received treatment as scheduled this past Monday.

## 2021-06-02 NOTE — ED ADULT NURSE REASSESSMENT NOTE - NS ED NURSE REASSESS COMMENT FT1
Patient calm and cooperative, happy eating his potato soup. Patients daughter is ANM for CDU/holding in the ED. Pending results of COVID test and then will go straight to the floor. VSS, CCM in place. Safety maintained until transfer upstairs. Will monitor.

## 2021-06-02 NOTE — CONSULT NOTE ADULT - SUBJECTIVE AND OBJECTIVE BOX
Patient is a 87y old  Male who presents with a chief complaint of     HPI: Presented w. Slurring of speech & confusion,     PAST MEDICAL & SURGICAL HISTORY:  DM (diabetes mellitus)  - resolved    AV block, 1st degree    Arrhythmia    CAD (coronary artery disease)    HTN (hypertension)    VT (ventricular tachycardia)    RICHARDS (dyspnea on exertion)    Anemia    Risk factors for obstructive sleep apnea    ESRD on dialysis  HD on Mondays and Fridays    Aortic stenosis  - s/p valve replacement    GI bleeding  due to ulcerated polyps and colonic AVMs    H/O circumcision  at  age  65    H/O left knee surgery    H/O angioplasty  2013,  no  intervention    A-V fistula  left arm 5/2017    H/O carotid endarterectomy  Right    S/P TAVR (transcatheter aortic valve replacement)    History of loop recorder        FAMILY HISTORY:  Family history of cancer (Grandparent)    Family history of lung cancer (Grandparent)    Family history of premature CAD    FH: type 2 diabetes    Social History: Non Smoker,     MEDICATIONS  (STANDING):  epoetin juan-epbx (RETACRIT) Injectable 76732 Unit(s) SubCutaneous <User Schedule>    MEDICATIONS  (PRN):    Allergies    Plavix (Hives)  Toprol-XL (Rash)    REVIEW OF SYSTEMS:    CONSTITUTIONAL: No fever,  + weight loss,  fatigue  EYES: No eye pain, visual disturbances, or discharge  ENMT:  No difficulty hearing, tinnitus, vertigo; No sinus or throat pain  NECK: No pain or stiffness  RESPIRATORY: No cough, wheezing, chills or hemoptysis; No shortness of breath  CARDIOVASCULAR: No chest pain, palpitations, dizziness, + leg swelling ( L > r )  GASTROINTESTINAL: No abdominal or epigastric pain. No nausea, vomiting, or hematemesis; No diarrhea or constipation. No melena or hematochezia.  GENITOURINARY: No dysuria, frequency, hematuria, or incontinence  NEUROLOGICAL: No headaches, memory loss, ++ loss of strength, numbness,  tremors  SKIN: No itching, burning, rashes, or lesions   LYMPH NODES: No enlarged glands  ENDOCRINE: No heat or cold intolerance; No hair loss  MUSCULOSKELETAL: No joint pain or swelling; No muscle, back, or extremity pain  PSYCHIATRIC: + depression, anxiety, mood swings,  difficulty sleeping  HEME/LYMPH: No easy bruising, or bleeding gums  ALLERGY AND IMMUNOLOGIC: No hives or eczema    Vital Signs Last 24 Hrs,    T(C): 36.4 (02 Jun 2021 12:00), Max: 36.4 (02 Jun 2021 12:00)  T(F): 97.5 (02 Jun 2021 12:00), Max: 97.5 (02 Jun 2021 12:00)  HR: 79 (02 Jun 2021 12:00) (79 - 79)  BP: 179/71 (02 Jun 2021 12:00) (179/71 - 179/71)  RR: 14 (02 Jun 2021 12:00) (14 - 14)  SpO2: 97% (02 Jun 2021 12:00) (97% - 97%)    PHYSICAL EXAM:    GENERAL: NAD, well-groomed, well-developed, Pale, Elderly, Frail,   HEAD:  Atraumatic, Normocephalic  EYES: EOMI, PERRLA, conjunctiva and sclera clear  ENMT: Moist mucous membranes,   NECK: Supple, No JVD,   NERVOUS SYSTEM:  Alert & Oriented X 3,   CHEST/LUNG: Clear to percussion bilaterally; No rales, rhonchi, wheezing, or rubs  HEART: Regular rate and rhythm; No murmurs, rubs, or gallops, + PPM,   ABDOMEN: Soft, Nontender, Nondistended; Bowel sounds present  EXTREMITIES:  2+ Peripheral Pulses, No clubbing, cyanosis,  + edema  LYMPH: No lymphadenopathy noted  SKIN: No rashes or lesions,    AVF +    LABS:                        10.0   7.93  )-----------( 271      ( 02 Jun 2021 12:33 )             32.7     06-02    138  |  94<L>  |  35.0<H>  ----------------------------<  139<H>  4.1   |  28.0  |  4.33<H>    Ca    9.3      02 Jun 2021 12:33    TPro  6.8  /  Alb  3.4  /  TBili  0.5  /  DBili  x   /  AST  134<H>  /  ALT  60<H>  /  AlkPhos  114  06-02    PT/INR - ( 02 Jun 2021 12:33 )   PT: 13.2 sec;   INR: 1.15 ratio      PTT - ( 02 Jun 2021 12:33 )  PTT:33.2 sec    RADIOLOGY & ADDITIONAL TESTS:    CT Brain Stroke Protocol (06.02.21 @ 12:59)     EXAM:  CT BRAIN STROKE PROTOCOL                          PROCEDURE DATE:  06/02/2021      INTERPRETATION:  CLINICAL HISTORY: Stroke Code. . .    TECHNIQUE: Noncontrast CT head. Coronal and sagittal reformats were obtained.    COMPARISON STUDY:CT head 3/22/2021    FINDINGS:    There is no acute intracranial hemorrhage or mass effect. There are areas of hypodensity in the bilateral hemispheric white matter suggesting chronic white matter microvascular ischemic change. There is cerebral volume loss. A calcification in the left parietal lobe is again seen.    There is no extraaxial fluid collection.    Intracranial atherosclerotic calcifications.    There is no displaced calvarial fracture. The visualized orbits are within normal limits. The visualized portions of the paranasal sinuses are well aerated. The mastoid air cells are well aerated.      IMPRESSION:    No evidence of intracranial hemorrhage or mass effect. If clinical suspicion for acute infarct persists, brain MRI can be obtained if there is no contraindication.    CT head findings were discussed with Dr. VARGHESE BAJWA [phone] at 6/2/2021 1:05 PM by Dr. Carlyle Manriquez with read back confirmation.  Troponin T, Serum: 0.58: Reference Interval for Troponin T   Less than 0.06 ng/mL - includes the 99th percentile of a healthy   population at a method C.V. of 10% or less.   NOTE: Troponin T is measured by the Roche CLIA method and these   results are not interchangable with Troponin I results since they are   different assays with different reference intervals. ng/mL (06.02.21 @ 12:33)     Historical Values  Troponin T, Serum: 0.58: Reference Interval for Troponin T   Less than 0.06 ng/mL - includes the 99th percentile of a healthy   population at a method C.V. of 10% or less.   NOTE: Troponin T is measured by the Roche CLIA method and these   results are not interchangable with Troponin I results since they are   different assays with different reference intervals. ng/mL (06.02.21 @ 12:33)   Troponin T, Serum: 0.05 ng/mL (03.26.21 @ 10:37)   Troponin T, Serum: 0.07: Reference Interval for Troponin T   Less than 0.06 ng/mL - includes the 99th percentile of a healthy   population at a method C.V. of 10% or less.   NOTE: Troponin T is measured by the Roche CLIA method and these   results are not interchangable with Troponin I results since they are   different assays with different reference intervals. ng/mL (03.26.21 @ 02:38)   CARLYLE MANRIQUEZ MD; Attending Radiologist  This document has been electronically signed. Jun 2 2021  1:24PM    12 Lead ECG (04.01.21 @ 23:33)     Ventricular Rate 57 BPM    Atrial Rate 86 BPM    QRS Duration 166 ms    Q-T Interval 512 ms    QTC Calculation(Bazett) 498 ms    P Axis 54 degrees    R Axis -33 degrees    T Axis 92 degrees    Diagnosis Line Ventricular-paced rhythm  Abnormal ECG    Confirmed by TONA LEVY [id number]) on 4/2/2021 3:00:50 PM

## 2021-06-02 NOTE — ED PROVIDER NOTE - CLINICAL SUMMARY MEDICAL DECISION MAKING FREE TEXT BOX
Pt weakness with multiple medical problems. Will eval with stroke workup, lab work,. Consult vascular surgery and renal.

## 2021-06-02 NOTE — ED PROVIDER NOTE - NS_ ATTENDINGSCRIBEDETAILS _ED_A_ED_FT
I, Aguila Colbert, performed the initial face to face bedside interview with this patient regarding history of present illness, review of symptoms and relevant past medical, social and family history.  I completed an independent physical examination.    The history, relevant review of systems, past medical and surgical history, medical decision making, and physical examination was documented by the scribe in my presence and I attest to the accuracy of the documentation.

## 2021-06-02 NOTE — H&P ADULT - ASSESSMENT
87 y/o M with PMHx of HTN, HLD, CAD, HFpEF, s/p Right CEA for Carotid artery disease, ESRD on HD 2d/week (M/Fri) via LUE fistula, s/p flecainide w/ ILR placed 6/2020, AS s/p TAVR, and PAD (RLE fem-pop bypass October 2020) multiple gastric AVM's s/p cauterized s/p LLE fem-pop bypass, and had the bypass performed on 3/20/21, post procedure developed L femoral DVT and s/p revision of LLE bypass 3/25/21 brought to ED with cc of b/l LE ext weakness with difficulty to ambulate for past 2 days. In addition, per daughter pt has slurred speech since yesterday. In ED CT head negative however has elevated trop. Pt Denies any sob, no chest pain, no n/v or abd pain. Per pt has lower extremity weakness and cant walk.    1) Elevated trop rule out ACS  - admit to medicine/tele  - pt on HD can be elevated due to ESRD, pt denies any chest pain or resp sx, EKG no changes  - trend trop level  - [facility] cardio consulted buy ED  - 2decho ordered  - on plavix and statin    2) Slurred speech  - on exam very mild slurred speech  - ct head negative  - MRI brain ordered   - Per EP leadless pacemaker is MRI contingent. they will see pt and have program setting changed before MRI and reprogramed after MRI is done.  - stroke protocol activated  - Neuro Dr. Strong consulted  - already on Plavix and statin  - check A1c and lipid panel  - neuro checks    3) Generalized weakness with difficulty ambulating   - PT eval ordered  - neuro consulted as well    4) PVD/HLD/HTN  - continue Plavix statin, nifedipine Imdur    5) ESRD on HD  - renal following  - HD 2x per week    6) Depression  - c/w duloxetine    7) Hx of GI bleed  - on PPI  - Hbg is stable    8) prophylactic measure  - DVT ppx heparin SQ    Plan discussed with daughter & patient at bedside and answered all their questions. They understand & agree with above plan.  85 y/o M with PMHx of HTN, HLD, CAD, HFpEF, s/p Right CEA for Carotid artery disease, ESRD on HD 2d/week (M/Fri) via LUE fistula, s/p flecainide w/ ILR placed 6/2020, AS s/p TAVR, and PAD (RLE fem-pop bypass October 2020) multiple gastric AVM's s/p cauterized s/p LLE fem-pop bypass, and had the bypass performed on 3/20/21, post procedure developed L femoral DVT and s/p revision of LLE bypass 3/25/21 brought to ED with cc of b/l LE ext weakness with difficulty to ambulate for past 2 days. In addition, per daughter pt has slurred speech since yesterday. In ED CT head negative however has elevated trop. Pt Denies any sob, no chest pain, no n/v or abd pain. Per pt has lower extremity weakness and cant walk.    1) Elevated trop rule out ACS  - admit to medicine/tele  - pt on HD can be elevated due to ESRD, pt denies any chest pain or resp sx, EKG no changes  - trend trop level  - [facility] cardio consulted buy ED  - 2decho ordered  - on plavix and statin    2) Slurred speech  - on exam very mild slurred speech  - ct head negative  - MRI brain ordered   - Per EP leadless pacemaker is MRI contingent. they will see pt and have program setting changed before MRI and reprogramed after MRI is done.  - stroke protocol activated  - Neuro Dr. Strong consulted  - already on Plavix and statin  - check A1c and lipid panel  - neuro checks    3) Generalized weakness with difficulty ambulating   - PT eval ordered, likely will need rehab post discharge  - neuro consulted as well    4) PVD/HLD/HTN  - continue Plavix statin, nifedipine Imdur    5) ESRD on HD  - renal following  - HD 2x per week    6) Depression  - c/w duloxetine    7) Hx of GI bleed  - on PPI  - Hbg is stable    8) prophylactic measure  - DVT ppx heparin SQ    Plan discussed with daughter & patient at bedside and answered all their questions. They understand & agree with above plan.

## 2021-06-02 NOTE — H&P ADULT - NSHPLABSRESULTS_GEN_ALL_CORE
10.0   7.93  )-----------( 271      ( 02 Jun 2021 12:33 )             32.7       06-02    138  |  94<L>  |  35.0<H>  ----------------------------<  139<H>  4.1   |  28.0  |  4.33<H>    Ca    9.3      02 Jun 2021 12:33    TPro  6.8  /  Alb  3.4  /  TBili  0.5  /  DBili  x   /  AST  134<H>  /  ALT  60<H>  /  AlkPhos  114  06-02      CARDIAC MARKERS ( 02 Jun 2021 12:33 )  x     / 0.58 ng/mL / x     / x     / x        < from: CT Brain Stroke Protocol (06.02.21 @ 12:59) >     EXAM:  CT BRAIN STROKE PROTOCOL                          PROCEDURE DATE:  06/02/2021          INTERPRETATION:  CLINICAL HISTORY: Stroke Code. . .    TECHNIQUE: Noncontrast CT head. Coronal and sagittal reformats were obtained.    COMPARISON STUDY:CT head 3/22/2021    FINDINGS:    There is no acute intracranial hemorrhage or mass effect. There are areas of hypodensity in the bilateral hemispheric white matter suggesting chronic white matter microvascular ischemic change. There is cerebral volume loss. A calcification in the left parietal lobe is again seen.    There is no extraaxial fluid collection.    Intracranial atherosclerotic calcifications.    There is no displaced calvarial fracture. The visualized orbits are within normal limits. The visualized portions of the paranasal sinuses are well aerated. The mastoid air cells are well aerated.      IMPRESSION:    No evidence of intracranial hemorrhage or mass effect. If clinical suspicion for acute infarct persists, brain MRI can be obtained if there is no contraindication.    CT head findings were discussed with Dr. VARGHESE BAJWA [phone] at 6/2/2021 1:05 PM by Dr. Huber Manriquez with read back confirmation.            HUBER MANRIQUEZ MD; Attending Radiologist  This document has been electronically signed. Jun 2 2021  1:24PM    < end of copied text >

## 2021-06-02 NOTE — ED PROVIDER NOTE - OBJECTIVE STATEMENT
88 y/o male with PMHx of CAD, ESRD, DM, HTN, ventricular tachycardia c/o generalized weakness. PT has hx of multiple popliteal surgeries, increased weakness past 3 days, some slurred speech per daughter, hx of anemia with blood transfusions valve replacement.

## 2021-06-02 NOTE — CONSULT NOTE ADULT - ASSESSMENT
Assessment  Progressive functional decline doubt CVA nonfocal exam  h/o PAF off AC due to GIBs  leadless PPM  ILR in place  Nonobst CAD normal EF  s/p TAVR mod MR normal EF  ESRD on HD  min trop elevation ? significance no sx or ECG changes ? false pos in setting of ESRD      Rec  cont current meds  ILR interrogation in am to exclude PAF  cont PT  will follow

## 2021-06-02 NOTE — ED ADULT NURSE NOTE - CCCP TRG CHIEF CMPLNT
see chief complaint quote Paramedian Forehead Flap Text: A decision was made to reconstruct the defect utilizing an interpolation axial flap and a staged reconstruction.  A telfa template was made of the defect.  This telfa template was then used to outline the paramedian forehead pedicle flap.  The donor area for the pedicle flap was then injected with anesthesia.  The flap was excised through the skin and subcutaneous tissue down to the layer of the underlying musculature.  The pedicle flap was carefully excised within this deep plane to maintain its blood supply.  The edges of the donor site were undermined.   The donor site was closed in a primary fashion.  The pedicle was then rotated into position and sutured.  Once the tube was sutured into place, adequate blood supply was confirmed with blanching and refill.  The pedicle was then wrapped with xeroform gauze and dressed appropriately with a telfa and gauze bandage to ensure continued blood supply and protect the attached pedicle.

## 2021-06-02 NOTE — H&P ADULT - HISTORY OF PRESENT ILLNESS
85 y/o M with PMHx of HTN, HLD, CAD, HFpEF, s/p Right CEA for Carotid artery disease, ESRD on HD 2d/week (M/Fri) via LUE fistula, s/p flecainide w/ ILR placed 6/2020, AS s/p TAVR, and PAD (RLE fem-pop bypass October 2020) multiple gastric AVM's s/p cauterized s/p LLE fem-pop bypass, and had the bypass performed on 3/20/21, post procedure developed L femoral DVT and s/p revision of LLE bypass 3/25/21 brought to ED with cc of b/l LE ext weakness with difficulty to ambulate for past 2 days. In addition, per daughter pt has slurred speech since yesterday. In ED CT head negative however has elevated trop. Pt Denies any sob, no chest pain, no n/v or abd pain. Per pt has lower extremity weakness and cant walk.

## 2021-06-02 NOTE — CONSULT NOTE ADULT - ASSESSMENT
The patient is a 87y Male who is followed by neurology because of leg weakness    Leg weakness  possibly related to previous vascular issues  possible CVA  check MRI brain  PT/OT  continue plavix and lipitor  further recommendations to follow post MRI brain results    will follow with you    Bassam Strong MD PhD   [phone]

## 2021-06-02 NOTE — PROCEDURE NOTE - ADDITIONAL PROCEDURE DETAILS
No acute arrhythmias detected.   One symptom activation on 5/1/21  This device in MRI contingent.
The above device is MRI contingent  Patient is not pacemaker dependent  This device will require programming prior to and after MRI scanning.   MRI form completed and placed in patient's paper chart.   Please see printed interrogation for further details.

## 2021-06-02 NOTE — PROCEDURE NOTE - NSICDXPROCEDURE_GEN_ALL_CORE_FT
PROCEDURES:  Interrogation of implantable loop recorder device 02-Jun-2021 14:37:48  Ammon Blackwell  
PROCEDURES:  Interrogation of implantable loop recorder device 02-Jun-2021 14:37:48  Ammon Blackwell  Cardiac pacemaker interrogation 02-Jun-2021 14:46:03  Ammon Blackwell

## 2021-06-03 ENCOUNTER — APPOINTMENT (OUTPATIENT)
Age: 86
End: 2021-06-03

## 2021-06-03 LAB
ANION GAP SERPL CALC-SCNC: 16 MMOL/L — SIGNIFICANT CHANGE UP (ref 5–17)
BUN SERPL-MCNC: 42.8 MG/DL — HIGH (ref 8–20)
CALCIUM SERPL-MCNC: 9 MG/DL — SIGNIFICANT CHANGE UP (ref 8.6–10.2)
CHLORIDE SERPL-SCNC: 96 MMOL/L — LOW (ref 98–107)
CHOLEST SERPL-MCNC: 126 MG/DL — SIGNIFICANT CHANGE UP
CO2 SERPL-SCNC: 26 MMOL/L — SIGNIFICANT CHANGE UP (ref 22–29)
COVID-19 SPIKE DOMAIN AB INTERP: POSITIVE
COVID-19 SPIKE DOMAIN ANTIBODY RESULT: 48.7 U/ML — HIGH
CREAT SERPL-MCNC: 4.8 MG/DL — HIGH (ref 0.5–1.3)
GLUCOSE SERPL-MCNC: 94 MG/DL — SIGNIFICANT CHANGE UP (ref 70–99)
HCT VFR BLD CALC: 31.3 % — LOW (ref 39–50)
HDLC SERPL-MCNC: 60 MG/DL — SIGNIFICANT CHANGE UP
HGB BLD-MCNC: 9.5 G/DL — LOW (ref 13–17)
LIPID PNL WITH DIRECT LDL SERPL: 46 MG/DL — SIGNIFICANT CHANGE UP
MAGNESIUM SERPL-MCNC: 2 MG/DL — SIGNIFICANT CHANGE UP (ref 1.8–2.6)
MCHC RBC-ENTMCNC: 28.8 PG — SIGNIFICANT CHANGE UP (ref 27–34)
MCHC RBC-ENTMCNC: 30.4 GM/DL — LOW (ref 32–36)
MCV RBC AUTO: 94.8 FL — SIGNIFICANT CHANGE UP (ref 80–100)
NON HDL CHOLESTEROL: 66 MG/DL — SIGNIFICANT CHANGE UP
PLATELET # BLD AUTO: 262 K/UL — SIGNIFICANT CHANGE UP (ref 150–400)
POTASSIUM SERPL-MCNC: 3.7 MMOL/L — SIGNIFICANT CHANGE UP (ref 3.5–5.3)
POTASSIUM SERPL-SCNC: 3.7 MMOL/L — SIGNIFICANT CHANGE UP (ref 3.5–5.3)
RBC # BLD: 3.3 M/UL — LOW (ref 4.2–5.8)
RBC # FLD: 18.6 % — HIGH (ref 10.3–14.5)
SARS-COV-2 IGG+IGM SERPL QL IA: 48.7 U/ML — HIGH
SARS-COV-2 IGG+IGM SERPL QL IA: POSITIVE
SARS-COV-2 RNA SPEC QL NAA+PROBE: SIGNIFICANT CHANGE UP
SODIUM SERPL-SCNC: 138 MMOL/L — SIGNIFICANT CHANGE UP (ref 135–145)
TRIGL SERPL-MCNC: 101 MG/DL — SIGNIFICANT CHANGE UP
WBC # BLD: 9.3 K/UL — SIGNIFICANT CHANGE UP (ref 3.8–10.5)
WBC # FLD AUTO: 9.3 K/UL — SIGNIFICANT CHANGE UP (ref 3.8–10.5)

## 2021-06-03 PROCEDURE — 99233 SBSQ HOSP IP/OBS HIGH 50: CPT

## 2021-06-03 PROCEDURE — 93970 EXTREMITY STUDY: CPT | Mod: 26

## 2021-06-03 PROCEDURE — 70551 MRI BRAIN STEM W/O DYE: CPT | Mod: 26

## 2021-06-03 PROCEDURE — 99222 1ST HOSP IP/OBS MODERATE 55: CPT | Mod: 24,GC

## 2021-06-03 RX ORDER — ACETAMINOPHEN 500 MG
650 TABLET ORAL ONCE
Refills: 0 | Status: COMPLETED | OUTPATIENT
Start: 2021-06-03 | End: 2021-06-03

## 2021-06-03 RX ORDER — HEPARIN SODIUM 5000 [USP'U]/ML
INJECTION INTRAVENOUS; SUBCUTANEOUS
Qty: 25000 | Refills: 0 | Status: DISCONTINUED | OUTPATIENT
Start: 2021-06-03 | End: 2021-06-03

## 2021-06-03 RX ADMIN — PANTOPRAZOLE SODIUM 40 MILLIGRAM(S): 20 TABLET, DELAYED RELEASE ORAL at 05:22

## 2021-06-03 RX ADMIN — PANTOPRAZOLE SODIUM 40 MILLIGRAM(S): 20 TABLET, DELAYED RELEASE ORAL at 18:41

## 2021-06-03 RX ADMIN — Medication 650 MILLIGRAM(S): at 05:22

## 2021-06-03 RX ADMIN — Medication 60 MILLIGRAM(S): at 21:19

## 2021-06-03 RX ADMIN — HEPARIN SODIUM 5000 UNIT(S): 5000 INJECTION INTRAVENOUS; SUBCUTANEOUS at 05:21

## 2021-06-03 RX ADMIN — ATORVASTATIN CALCIUM 80 MILLIGRAM(S): 80 TABLET, FILM COATED ORAL at 21:19

## 2021-06-03 RX ADMIN — CLOPIDOGREL BISULFATE 75 MILLIGRAM(S): 75 TABLET, FILM COATED ORAL at 09:38

## 2021-06-03 RX ADMIN — TAMSULOSIN HYDROCHLORIDE 0.4 MILLIGRAM(S): 0.4 CAPSULE ORAL at 21:19

## 2021-06-03 RX ADMIN — ISOSORBIDE MONONITRATE 30 MILLIGRAM(S): 60 TABLET, EXTENDED RELEASE ORAL at 09:38

## 2021-06-03 RX ADMIN — DULOXETINE HYDROCHLORIDE 60 MILLIGRAM(S): 30 CAPSULE, DELAYED RELEASE ORAL at 09:38

## 2021-06-03 RX ADMIN — Medication 650 MILLIGRAM(S): at 05:58

## 2021-06-03 RX ADMIN — Medication 90 MILLIGRAM(S): at 05:21

## 2021-06-03 NOTE — CONSULT NOTE ADULT - SUBJECTIVE AND OBJECTIVE BOX
VASCULAR SURGERY CONSULT     HPI: 87 y/o M with PMHx of HTN, HLD, CAD, HFpEF, s/p Right CEA for Carotid artery disease, ESRD on HD 2d/week (M/Fri) via LUE fistula, s/p flecainide w/ ILR placed 6/2020, AS s/p TAVR, and PAD (RLE fem-pop bypass October 2020) multiple gastric AVM's s/p cauterized s/p LLE fem-pop bypass, and had the bypass performed on 3/20/21, post procedure developed L femoral DVT and s/p revision of LLE bypass 3/25/21 brought to ED with cc of b/l LE ext weakness with difficulty to ambulate for past 2 days. In addition, per daughter pt has slurred speech since yesterday. In ED CT head negative however has elevated trop. Pt Denies any sob, no chest pain, no n/v or abd pain. Per pt has lower extremity weakness and cant walk.      ROS: 10-system review is otherwise negative except HPI above.      PAST MEDICAL & SURGICAL HISTORY:  DM (diabetes mellitus)  - resolved  AV block, 1st degree  Arrhythmia  CAD (coronary artery disease)  HTN (hypertension)  VT (ventricular tachycardia)  RICHARDS (dyspnea on exertion)  Anemia  Risk factors for obstructive sleep apnea  ESRD on dialysis  HD on Mondays and Fridays  Aortic stenosis  - s/p valve replacement  GI bleeding  due to ulcerated polyps and colonic AVMs  H/O circumcision  at  age  65  H/O left knee surgery  H/O angioplasty  2013,  no  intervention  A-V fistula  left arm 5/2017  H/O carotid endarterectomy  Right  S/P TAVR (transcatheter aortic valve replacement)  History of loop recorder      FAMILY HISTORY:  Family history of cancer (Grandparent)  Family history of lung cancer (Grandparent)  Family history of premature CAD  FH: type 2 diabetes    SOCIAL HISTORY:  Denies any toxic habits    ALLERGIES: NKA Plavix (Hives)  Toprol-XL (Rash)      HOME MEDICATIONS:   Florastor 250 mg oral capsule: 1 cap(s) orally 2 times a day (02 Jun 2021 14:49)  Lipitor 80 mg oral tablet: 1 tab(s) orally once a day (at bedtime) (02 Jun 2021 14:49)  Nephro-Thomas oral tablet: 1 tab(s) orally once a day (14 Mar 2021 11:10)  NIFEdipine 60 mg oral tablet, extended release: 1 tab(s) orally once a day (at bedtime) (02 Jun 2021 14:49)  NIFEdipine 90 mg oral tablet, extended release: 1 tab(s) orally once a day (02 Jun 2021 14:49)  ocular lubricant ophthalmic solution: 1 drop(s) to each affected eye 2 times a day, As needed, Dry Eyes (08 Apr 2021 09:53)  torsemide 20 mg oral tablet: 1 tab(s) orally on non HD days (02 Jun 2021 14:49)  --------------------------------------------------------------------------------------------    PHYSICAL EXAM:   General: NAD, Lying in bed comfortably  Neuro: A+Ox3  HEENT: EOMI, PERRLA, MMM  Cardio: RRR  Resp: Non labored breathing on RA  GI/Abd: Soft, NT/ND, no rebound/guarding, no masses palpated  Vascular: All 4 extremities warm and well perfused. LLE palpable pulse of fem-AT bypass.  Pelvis: stable  Musculoskeletal: All 4 extremities moving spontaneously, no limitations, no spinal tenderness. LLE swollen, ACE wrap applied. 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  --------------------------------------------------------------------------------------------    LABS                 9.5    9.30   )----------(  262       ( 03 Jun 2021 06:07 )               31.3      138    |  96     |  42.8   ----------------------------<  94         ( 03 Jun 2021 06:07 )  3.7     |  26.0   |  4.80     Ca    9.0        ( 03 Jun 2021 06:07 )  Mg     2.0       ( 03 Jun 2021 06:07 )            CAPILLARY BLOOD GLUCOSE    CARDIAC MARKERS ( 02 Jun 2021 19:11 )  x     / 0.51 ng/mL / x     / x     / x              19:07 - VBG - pH:       | pCO2:       | pO2:       | Lactate: 1.5    19:06 - VBG - pH: 7.40  | pCO2: 48    | pO2: 70    | Lactate:          --------------------------------------------------------------------------------------------  IMAGING  < from: US Duplex Venous Lower Ext Complete, Bilateral (06.03.21 @ 14:52) >     EXAM:  US DPLX LWR EXT VEINS COMPL BI                          *** ADDENDUM 06/03/2021  ***    Attempts to reach Primo Vargas were unsuccessful.  Findings were discussed with AKIRA Douglas 6/3/2021 3:51 PM by Dr. Hakeem Coelho with read back confirmation.    *** END OF ADDENDUM 06/03/2021  ***      PROCEDURE DATE:  06/03/2021          INTERPRETATION:  CLINICAL INFORMATION: History of peripheral arterial disease, femoral popliteal bypass. Swelling.    COMPARISON: 4/26/2021    TECHNIQUE: Duplex sonography of the BILATERAL LOWER extremity veins with color and spectral Doppler, with and without compression.    FINDINGS:    RIGHT:  Normal compressibility of the RIGHT common femoral, femoral and popliteal veins.  Doppler examination shows normal spontaneous and phasic flow.  No RIGHT calf vein thrombosis is detected.    LEFT:  There is no evidence of thrombus within the LEFT femoral vein as well as popliteal vein. Flow is seen in the posterior tibial vein. The peroneal vein is not visualized.    IMPRESSION:  New extension of LEFT lower extremity thrombus into the LEFT femoral vein.

## 2021-06-03 NOTE — PHYSICAL THERAPY INITIAL EVALUATION ADULT - PERTINENT HX OF CURRENT PROBLEM, REHAB EVAL
87 y/o M PMHx of HTN, HLD, CAD, HFpEF, s/p Right CEA, ESRD on HD, s/p flecainide w/ ILR placed 6/2020, AS s/p TAVR, and PAD (RLE fem-pop bypass 10/ 2020) multiple gastric AVM's s/p cauterized s/p LLE fem-pop bypass, and bypass performed on 3/20/21, post procedure L femoral DVT, s/p revision of LLE bypass 3/25/21 brought to ED with cc of b/l LE weakness w difficulty ambulating for 2 days and per daughter, slurred speech. CTH (-), MRI of head pending

## 2021-06-03 NOTE — OCCUPATIONAL THERAPY INITIAL EVALUATION ADULT - ASR WT BEARING STATUS EVAL
Medication(s) to Refill:   Requested Prescriptions     Pending Prescriptions Disp Refills   • ESTRADIOL-NORETHINDRONE ACET 0.5-0.1 MG Oral Tab [Pharmacy Med Name: ESTRADIOL-NORETH 0.5-0.1 MG TB] 84 tablet 0     Sig: TAKE 1 TABLET BY MOUTH EVERY DAY       L no weight-bearing restrictions

## 2021-06-03 NOTE — CONSULT NOTE ADULT - ASSESSMENT
ASSESSMENT: Patient is a 87y old male with new extension of LLE thrombus into the left femoral vein. MRI suggesting subdural hematomas vs hygromas.     PLAN:    - From vascular surgery standpoint patient needs anticoagulation with heparin drip pending neurosurgery clearance given possible SDH on MRI.   - Plan discussed with Attending, Dr. Mason

## 2021-06-03 NOTE — PROGRESS NOTE ADULT - ASSESSMENT
The patient is a 87y Male who is followed by neurology because of leg weakness    Leg weakness  possibly related to previous vascular issues, extension of left LE DVT  MRI brain did not show stroke  PT/OT  continue plavix and lipitor  MRI ,,C, T- and LS s-pine ordered by neurosurgery    will follow with you    Bassam Strong MD PhD   [phone]

## 2021-06-03 NOTE — PHYSICAL THERAPY INITIAL EVALUATION ADULT - CRITERIA FOR SKILLED THERAPEUTIC INTERVENTIONS
[facility] vs Home with 24/7 assist, Home PT, RW (possible transfers only/ w/c level, pending progress)/impairments found/anticipated equipment needs at discharge/anticipated discharge recommendation

## 2021-06-03 NOTE — CONSULT NOTE ADULT - ATTENDING COMMENTS
Patient well known to the vascular service, with PAD, most recently s/p left CFA to AT bypass on 3/25/21  Had known left popliteal DVT but not on AC.  Now admitted for work-up of slurred speech and weakness  His daughter reports worsening LLE swelling over the last few days  Venous duplex performed today reveals propagation of his DVT, now extending up to the left CFV  On exam, his bypass is patent and palpable  There is significant LLE swelling but calf is soft and non tender  -Recommend therapeutic AC with heparin drip  -LLE compressive dressing applied  -Leg elevation while in bed

## 2021-06-03 NOTE — PROGRESS NOTE ADULT - SUBJECTIVE AND OBJECTIVE BOX
HPI: 85 y/o M with PMHx of HTN, HLD, CAD, HFpEF, s/p Right CEA for Carotid artery disease, ESRD on HD 2d/week (M/Fri) via LUE fistula, s/p flecainide w/ ILR placed 6/2020, AS s/p TAVR, and PAD (RLE fem-pop bypass October 2020) multiple gastric AVM's s/p cauterized s/p LLE fem-pop bypass, and had the bypass performed on 3/20/21, post procedure developed L femoral DVT and s/p revision of LLE bypass 3/25/21 brought to ED with cc of b/l LE ext weakness with difficulty to ambulate for past 2 days. In addition, per daughter pt has slurred speech since yesterday. In ED CT head negative however has elevated trop. Pt Denies any sob, no chest pain, no n/v or abd pain. Per pt has lower extremity weakness and cant walk. (02 Jun 2021 14:43)      MEDICATIONS  (STANDING):  acetaminophen   Tablet .. 650 milliGRAM(s) Oral once  atorvastatin 80 milliGRAM(s) Oral at bedtime  clopidogrel Tablet 75 milliGRAM(s) Oral daily  DULoxetine 60 milliGRAM(s) Oral daily  epoetin juan-epbx (RETACRIT) Injectable 65132 Unit(s) SubCutaneous <User Schedule>  heparin   Injectable 5000 Unit(s) SubCutaneous every 12 hours  isosorbide   mononitrate ER Tablet (IMDUR) 30 milliGRAM(s) Oral daily  NIFEdipine XL 90 milliGRAM(s) Oral daily  NIFEdipine XL 60 milliGRAM(s) Oral at bedtime  pantoprazole    Tablet 40 milliGRAM(s) Oral two times a day  tamsulosin 0.4 milliGRAM(s) Oral at bedtime    MEDICATIONS  (PRN):      Vital Signs Last 24 Hrs  T(C): 36.6 (03 Jun 2021 09:40), Max: 36.9 (02 Jun 2021 18:55)  T(F): 97.8 (03 Jun 2021 09:40), Max: 98.5 (02 Jun 2021 18:55)  HR: 91 (03 Jun 2021 09:40) (78 - 98)  BP: 152/74 (03 Jun 2021 09:40) (147/69 - 158/76)  BP(mean): --  RR: 17 (03 Jun 2021 09:40) (15 - 18)  SpO2: 95% (03 Jun 2021 09:40) (92% - 97%)    GEN: NAD, comfortable, resting in bed  HEENT: NC/AT, EOMI, PERRLA, MMM, clear conjunctiva and sclera, normal hearing, no nasal discharge, throat clear, no thrush, normal dentition.   NECK: supple, no JVD, no LAD, no thyromegaly  BACK:  ROM intact, no spinal/paraspinal tenderness  CV: S1S2, RRR, no mumur  RESP: good air movement, CTABL, no rales, rhonchi or wheezing, respirations unlabored  ABD: +BS, soft, ND, NT, no guarding, no rigidity, no HSM  EXT: +2 radial and pedal pulses, no edema, no calve tenderness  SKIN: No visible Rashes or Ulcers  MSK: ROM intact in all extremities  NEURO:  sensory and CN 2-12 Grossly intact, no motor deficits, no, saddle anesthesia, AOx3  PSYCH: normal behavior         LABS:                          9.5    9.30  )-----------( 262      ( 03 Jun 2021 06:07 )             31.3     03 Jun 2021 06:07    138    |  96     |  42.8   ----------------------------<  94     3.7     |  26.0   |  4.80     Ca    9.0        03 Jun 2021 06:07  Mg     2.0       03 Jun 2021 06:07    TPro  6.8    /  Alb  3.4    /  TBili  0.5    /  DBili  x      /  AST  134    /  ALT  60     /  AlkPhos  114    02 Jun 2021 12:33    LIVER FUNCTIONS - ( 02 Jun 2021 12:33 )  Alb: 3.4 g/dL / Pro: 6.8 g/dL / ALK PHOS: 114 U/L / ALT: 60 U/L / AST: 134 U/L / GGT: x           PT/INR - ( 02 Jun 2021 12:33 )   PT: 13.2 sec;   INR: 1.15 ratio         PTT - ( 02 Jun 2021 12:33 )  PTT:33.2 sec  CAPILLARY BLOOD GLUCOSE        CARDIAC MARKERS ( 02 Jun 2021 19:11 )  x     / 0.51 ng/mL / x     / x     / x      CARDIAC MARKERS ( 02 Jun 2021 12:33 )  x     / 0.58 ng/mL / x     / x     / x              RADIOLOGY:         HPI: 87 y/o M with PMHx of HTN, HLD, CAD, HFpEF, s/p Right CEA for Carotid artery disease, ESRD on HD 2d/week (M/Fri) via LUE fistula, s/p flecainide w/ ILR placed 6/2020, AS s/p TAVR, and PAD (RLE fem-pop bypass October 2020) multiple gastric AVM's s/p cauterized s/p LLE fem-pop bypass, and had the bypass performed on 3/20/21, post procedure developed L femoral DVT and s/p revision of LLE bypass 3/25/21 brought to ED with cc of b/l LE ext weakness with difficulty to ambulate for past 2 days. In addition, per daughter pt has slurred speech since yesterday. In ED CT head negative however has elevated trop. Pt Denies any sob, no chest pain, no n/v or abd pain. Per pt has lower extremity weakness and cant walk. (02 Jun 2021 14:43)    Subjective: Patient seen and examined at bedside, in no apparent distress, continues with weakness to b/l legs, L> R. No overnight issues reported. Planned for MRI today. Patient states that he fell 6 days ago getting out of his wheel chair. Is mostly in bed or wheel chair at home.     REVIEW OF SYSTEMS:    CONSTITUTIONAL: No weakness, fevers or chills  EYES/ENT: No visual changes;  No vertigo or throat pain   NECK: No pain or stiffness  RESPIRATORY: No cough, wheezing, hemoptysis; No shortness of breath  CARDIOVASCULAR: No chest pain or palpitations  GASTROINTESTINAL: No abdominal or epigastric pain. No nausea, vomiting, or hematemesis; No diarrhea or constipation. No melena or hematochezia.  GENITOURINARY: No dysuria, frequency or hematuria  NEUROLOGICAL: No numbness or weakness  SKIN: No itching, rashes  MSK: LE swelling, weakness     Vital Signs Last 24 Hrs  T(C): 36.6 (03 Jun 2021 09:40), Max: 36.9 (02 Jun 2021 18:55)  T(F): 97.8 (03 Jun 2021 09:40), Max: 98.5 (02 Jun 2021 18:55)  HR: 91 (03 Jun 2021 09:40) (78 - 98)  BP: 152/74 (03 Jun 2021 09:40) (147/69 - 158/76)  RR: 17 (03 Jun 2021 09:40) (15 - 18)  SpO2: 95% (03 Jun 2021 09:40) (92% - 97%)    GEN: NAD, comfortable  HEENT: NC/AT,  PERRLA, MMM, clear conjunctiva and sclera, normal hearing   NECK: supple, no JVD, no LAD, no thyromegaly  BACK:  ROM intact, no spinal/paraspinal tenderness  CV: S1S2, RRR, no mumur  RESP: good air movement, CTABL, no rales, rhonchi or wheezing, respirations unlabored  ABD: +BS, soft, ND, NT, no guarding, no rigidity, no HSM  EXT: +2 radial and pedal pulses, b/l LE edema, L>R  NEURO: 5/5 strength to b/l UE, ~4/5 to LLE and RLE, alert and oriented x 3, no facial asymmetry  PSYCH: normal behavior       MEDICATIONS  (STANDING):  acetaminophen   Tablet .. 650 milliGRAM(s) Oral once  atorvastatin 80 milliGRAM(s) Oral at bedtime  clopidogrel Tablet 75 milliGRAM(s) Oral daily  DULoxetine 60 milliGRAM(s) Oral daily  epoetin juan-epbx (RETACRIT) Injectable 12560 Unit(s) SubCutaneous <User Schedule>  isosorbide   mononitrate ER Tablet (IMDUR) 30 milliGRAM(s) Oral daily  NIFEdipine XL 90 milliGRAM(s) Oral daily  NIFEdipine XL 60 milliGRAM(s) Oral at bedtime  pantoprazole    Tablet 40 milliGRAM(s) Oral two times a day  tamsulosin 0.4 milliGRAM(s) Oral at bedtime    MEDICATIONS  (PRN):    LABS:                          9.5    9.30  )-----------( 262      ( 03 Jun 2021 06:07 )             31.3     03 Jun 2021 06:07    138    |  96     |  42.8   ----------------------------<  94     3.7     |  26.0   |  4.80     Ca    9.0        03 Jun 2021 06:07  Mg     2.0       03 Jun 2021 06:07    TPro  6.8    /  Alb  3.4    /  TBili  0.5    /  DBili  x      /  AST  134    /  ALT  60     /  AlkPhos  114    02 Jun 2021 12:33    LIVER FUNCTIONS - ( 02 Jun 2021 12:33 )  Alb: 3.4 g/dL / Pro: 6.8 g/dL / ALK PHOS: 114 U/L / ALT: 60 U/L / AST: 134 U/L / GGT: x           PT/INR - ( 02 Jun 2021 12:33 )   PT: 13.2 sec;   INR: 1.15 ratio         PTT - ( 02 Jun 2021 12:33 )  PTT:33.2 sec  CAPILLARY BLOOD GLUCOSE        CARDIAC MARKERS ( 02 Jun 2021 19:11 )  x     / 0.51 ng/mL / x     / x     / x      CARDIAC MARKERS ( 02 Jun 2021 12:33 )  x     / 0.58 ng/mL / x     / x     / x              RADIOLOGY:  < from: CT Brain Stroke Protocol (06.02.21 @ 12:59) >  FINDINGS:    There is no acute intracranial hemorrhage or mass effect. There are areas of hypodensity in the bilateral hemispheric white matter suggesting chronic white matter microvascular ischemic change. There is cerebral volume loss. A calcification in the left parietal lobe is again seen.    There is no extraaxial fluid collection.    Intracranial atherosclerotic calcifications.    There is no displaced calvarial fracture. The visualized orbits are within normal limits. The visualized portions of the paranasal sinuses are well aerated. The mastoid air cells are well aerated.      IMPRESSION:    No evidence of intracranial hemorrhage or mass effect. If clinical suspicion for acute infarct persists, brain MRI can be obtained if there is no contraindication.

## 2021-06-03 NOTE — PROGRESS NOTE ADULT - SUBJECTIVE AND OBJECTIVE BOX
[facility]                              [address] [address]                                                 PHONE: [phone]                                                 FAX: [phone]  -----------------------------------------------------------------------------------------------  Pt seen and examined. FU for elevated troponin    Overnight events/Complaints: Pt sitting up in chair. Still reports some weakness in his legs. Tolerating po diet.     Vital Signs Last 24 Hrs  T(C): 36.6 (03 Jun 2021 09:40), Max: 36.9 (02 Jun 2021 18:55)  T(F): 97.8 (03 Jun 2021 09:40), Max: 98.5 (02 Jun 2021 18:55)  HR: 91 (03 Jun 2021 09:40) (78 - 98)  BP: 152/74 (03 Jun 2021 09:40) (147/69 - 179/71)  BP(mean): --  RR: 17 (03 Jun 2021 09:40) (14 - 18)  SpO2: 95% (03 Jun 2021 09:40) (92% - 97%)  I&O's Summary      PHYSICAL EXAM:  Constitutional: Appears well developed, well nourished; alert and co-operative  HEENT:     Head: Normocephalic and atraumatic  Neck: supple. No JVD  Cardiovascular: regular S1 S2  Respiratory: Lungs clear to auscultation; no crepitations, no wheeze  Gastrointestinal:  Soft, Non-tender, + BS	  Musculoskeletal: Normal range of motion. No edema  Skin: Warm and dry. No cyanosis . No diaphoresis.  Neurologic: Alert oriented to time place and person. Normal strength and no tremor.        LABS:                        9.5    9.30  )-----------( 262      ( 03 Jun 2021 06:07 )             31.3     06-03    138  |  96<L>  |  42.8<H>  ----------------------------<  94  3.7   |  26.0  |  4.80<H>    Ca    9.0      03 Jun 2021 06:07  Mg     2.0     06-03    TPro  6.8  /  Alb  3.4  /  TBili  0.5  /  DBili  x   /  AST  134<H>  /  ALT  60<H>  /  AlkPhos  114  06-02    CARDIAC MARKERS ( 02 Jun 2021 19:11 )  x     / 0.51 ng/mL / x     / x     / x      CARDIAC MARKERS ( 02 Jun 2021 12:33 )  x     / 0.58 ng/mL / x     / x     / x          PT/INR - ( 02 Jun 2021 12:33 )   PT: 13.2 sec;   INR: 1.15 ratio         PTT - ( 02 Jun 2021 12:33 )  PTT:33.2 sec    RADIOLOGY & ADDITIONAL STUDIES: (reviewed)  CXR was independently visualized/reviewed  and demonstrated: mild congestion    CARDIOLOGY TESTING:(reviewed)     ECG: reviewed SR 1st degree Av block PRWP    ECHO: < from: TTE Echo Complete w/o Contrast w/ Doppler (03.14.21 @ 11:36) >  < from: TTE Echo Complete w/o Contrast w/ Doppler (03.14.21 @ 11:36) >  Summary:   1. Moderately enlarged left atrium.   2. There is mild concentric left ventricular hypertrophy.   3. Left ventricular ejection fraction, by visual estimation, is 60 to 65%.   4. Grade II diastolic dysfunction. Elevated mean left atrial pressure.   5. Mildly enlarged right atrium.   6. Mildly enlarged right ventricle. Mildly reduced RV systolic function.   7. Mild mitral stenosis. Moderate mitral valve regurgitation. Elevated mean gradient likely due to volume overload. Repeat study after diuresis to erevaluate mitral valve. If clinically indicated.   8. 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 Well seated valve. Acceptable gradients. No regurgitation.   9. Mild tricuspid regurgitation.  10. Estimated pulmonary artery systolic pressure is 78 mmHg - severe pulmonary hypertension.  11. There is no evidence of pericardial effusion.    TELEMETRY independently visualized/reviewed and demonstrated : NSR [60-80  ]  with no arrhythmias;     ILR and PPM interrogation without any significant arrhythmias    MEDICATIONS:(reviewed)  MEDICATIONS  (STANDING):  atorvastatin 80 milliGRAM(s) Oral at bedtime  clopidogrel Tablet 75 milliGRAM(s) Oral daily  DULoxetine 60 milliGRAM(s) Oral daily  epoetin juan-epbx (RETACRIT) Injectable 10923 Unit(s) SubCutaneous <User Schedule>  heparin   Injectable 5000 Unit(s) SubCutaneous every 12 hours  isosorbide   mononitrate ER Tablet (IMDUR) 30 milliGRAM(s) Oral daily  NIFEdipine XL 90 milliGRAM(s) Oral daily  NIFEdipine XL 60 milliGRAM(s) Oral at bedtime  pantoprazole    Tablet 40 milliGRAM(s) Oral two times a day  tamsulosin 0.4 milliGRAM(s) Oral at bedtime      ASSESSMENT AND PLAN:    87y Male with prior history of h/o PAF off AC due to GIBs, leadless PPM, ILR in place, Nonobst CAD normal EF, s/p TAVR mod MR normal EF, ESRD on HD, min trop elevation likely non MI in setting of ESRD with progressive functional decline.    Continue Plavix  No significant arrhythmias noted  MRI pending today  Continue PT  BP controlled  Torsemide on non HD days [Pt gets HD on Monday and Friday as per pt]

## 2021-06-03 NOTE — CONSULT NOTE ADULT - SUBJECTIVE AND OBJECTIVE BOX
Patient is a 87y old  Male who presents with a chief complaint of Lower extremity weakness and elevated trop level (03 Jun 2021 17:43)    HISTORY OF PRESENT ILLNESS:   87 year old Male w/ PMHx of HTN, HLD, CAD, HFpEF, s/p Right CEA for Carotid artery disease, ESRD on HD 2d/week (M/Fri) via LUE fistula, s/p flecainide w/ ILR placed 6/2020, AS s/p TAVR, and PAD (RLE fem-pop bypass October 2020) multiple gastric AVM's s/p cauterized s/p LLE fem-pop bypass, and had the bypass performed on 3/20/21, post procedure developed L femoral DVT and s/p revision of LLE bypass 3/25/21 brought to ED with cc of b/l LE ext weakness with difficulty to ambulate for past 2 days. In addition, per daughter pt has slurred speech since yesterday. In ED CT head negative however has elevated trop. Pt Denies any sob, no chest pain, no n/v or abd pain. Per pt has lower extremity weakness and cant walk. (02 Jun 2021 14:43)  Patient seen and examined this evening.     PAST MEDICAL & SURGICAL HISTORY:  DM (diabetes mellitus)  - resolved  AV block, 1st degree  Arrhythmia  CAD (coronary artery disease)  HTN (hypertension)  VT (ventricular tachycardia)  RICHARDS (dyspnea on exertion)  Anemia  Risk factors for obstructive sleep apnea  ESRD on dialysis  HD on Mondays and Fridays  Aortic stenosis  - s/p valve replacement  GI bleeding  due to ulcerated polyps and colonic AVMs  H/O circumcision  at  age  65  H/O left knee surgery  H/O angioplasty  2013,  no  intervention  A-V fistula  left arm 5/2017  H/O carotid endarterectomy  Right  S/P TAVR (transcatheter aortic valve replacement)  History of loop recorder      FAMILY HISTORY:  Family history of cancer (Grandparent)  Family history of lung cancer (Grandparent)  Family history of premature CAD  FH: type 2 diabetes        SOCIAL HISTORY:  Tobacco Use:  EtOH use:   Substance:    Allergies    Plavix (Hives)  Toprol-XL (Rash)    Intolerances        REVIEW OF SYSTEMS  Negative except as noted in HPI  CONSTITUTIONAL: No fever, weight loss, or fatigue  EYES: No eye pain, visual disturbances, or discharge  ENMT:  No difficulty hearing, tinnitus, vertigo; No sinus or throat pain  NECK: No pain or stiffness  BREASTS: No pain, masses, or nipple discharge  RESPIRATORY: No cough, wheezing, chills or hemoptysis; No shortness of breath  CARDIOVASCULAR: No chest pain, palpitations, dizziness, or leg swelling  GASTROINTESTINAL: No abdominal or epigastric pain. No nausea, vomiting, or hematemesis; No diarrhea or constipation. No melena or hematochezia.  GENITOURINARY: No dysuria, frequency, hematuria, or incontinence  NEUROLOGICAL: No headaches, memory loss, loss of strength, numbness, or tremors  SKIN: No itching, burning, rashes, or lesions   LYMPH NODES: No enlarged glands  ENDOCRINE: No heat or cold intolerance; No hair loss  MUSCULOSKELETAL: No joint pain or swelling; No muscle, back, or extremity pain  PSYCHIATRIC: No depression, anxiety, mood swings, or difficulty sleeping  HEME/LYMPH: No easy bruising, or bleeding gums  ALLERY AND IMMUNOLOGIC: No hives or eczema    HOME MEDICATIONS:  Home Medications:  Florastor 250 mg oral capsule: 1 cap(s) orally 2 times a day (02 Jun 2021 14:49)  Lipitor 80 mg oral tablet: 1 tab(s) orally once a day (at bedtime) (02 Jun 2021 14:49)  Nephro-Thomas oral tablet: 1 tab(s) orally once a day (14 Mar 2021 11:10)  NIFEdipine 60 mg oral tablet, extended release: 1 tab(s) orally once a day (at bedtime) (02 Jun 2021 14:49)  NIFEdipine 90 mg oral tablet, extended release: 1 tab(s) orally once a day (02 Jun 2021 14:49)  ocular lubricant ophthalmic solution: 1 drop(s) to each affected eye 2 times a day, As needed, Dry Eyes (08 Apr 2021 09:53)  torsemide 20 mg oral tablet: 1 tab(s) orally on non HD days (02 Jun 2021 14:49)      MEDICATIONS:  Antibiotics:    Neuro:  acetaminophen   Tablet .. 650 milliGRAM(s) Oral once  DULoxetine 60 milliGRAM(s) Oral daily    Anticoagulation:  clopidogrel Tablet 75 milliGRAM(s) Oral daily  heparin  Infusion.  Unit(s)/Hr IV Continuous <Continuous>    OTHER:  atorvastatin 80 milliGRAM(s) Oral at bedtime  epoetin juan-epbx (RETACRIT) Injectable 77700 Unit(s) SubCutaneous <User Schedule>  isosorbide   mononitrate ER Tablet (IMDUR) 30 milliGRAM(s) Oral daily  NIFEdipine XL 90 milliGRAM(s) Oral daily  NIFEdipine XL 60 milliGRAM(s) Oral at bedtime  pantoprazole    Tablet 40 milliGRAM(s) Oral two times a day  tamsulosin 0.4 milliGRAM(s) Oral at bedtime    IVF:      Vital Signs Last 24 Hrs  T(C): 36.7 (03 Jun 2021 15:45), Max: 36.7 (03 Jun 2021 05:18)  T(F): 98.1 (03 Jun 2021 15:45), Max: 98.1 (03 Jun 2021 05:18)  HR: 92 (03 Jun 2021 15:45) (91 - 98)  BP: 144/63 (03 Jun 2021 15:45) (144/63 - 158/76)  BP(mean): 90 (03 Jun 2021 15:45) (90 - 90)  RR: 18 (03 Jun 2021 15:45) (16 - 18)  SpO2: 98% (03 Jun 2021 15:45) (92% - 98%)      PHYSICAL EXAM:  GENERAL: NAD, well-groomed, well-developed  HEAD:  Atraumatic, normocephalic  DRAINS:   WOUND: Dressing clean dry intact; well healed  SHUNT: easily compressible and refills  EYES: Conjunctiva and sclera clear; corneal reflex intact  ENMT: No tonsillar erythema, exudates, or enlargement; moist mucous membranes, good dentition, no lesions  NECK: Supple, no JVD, dormal thyroid  SACHA COMA SCORE: E- V- M- =       E: 4= opens eyes spontaneously 3= to voice 2= to noxious 1= no opening       V: 5= oriented 4= confused 3= inappropriate words 2= incomprehensible sounds 1= nonverbal 1T= intubated       M: 6= follows commands 5= localizes 4= withdraws 3= flexor posturing 2= extensor posturing 1= no movement  MENTAL STATUS: AAO x3; Awake/Comatose; Opens eyes spontaneously/to voice/to light touch/to noxious stimuli; Appropriately conversant without aphasia/Nonverbal; following simple commands/mimicking/not following commands  CRANIAL NERVES: Visual acuity normal for age, visual fields full to confrontation, PERRL. EOMI without nystagmus. Facial sensation intact V1-3 distribution b/l. Face symmetric w/ normal eye closure and smile, tongue midline. Hearing grossly intact. Speech clear. Head turning and shoulder shrug intact.   REFLEXES: PERRL. Corneals intact b/l. Gag intact. Cough intact. Oculocephalic reflex intact (Doll's eye). Negative Schulz's b/l. Negative clonus b/l  MOTOR: strength 5/5 b/l upper and lower extremities  Uppers     Delt (C5/6)     Bicep (C5/6)     Wrist Extend (C6)     Tricep (C7)     HG (C8/T1)  R                     5/5                 5/5                         5/5                           5/5                   5/5  L                      5/5                 5/5                         5/5                           5/5                   5/5  Lowers      HF(L1/L2)     KE (L3)     DF (L4)     EHL (L5)     PF (S1)      R                     5/5              5/5           5/5           5/5            5/5  L                     5/5               5/5          5/5            5/5            5/5  SENSATION: grossly intact to light touch all extremities  COORDINATION: Gait intact; rapid alternating movements intact b/l upper extremities; no upper extremity dysmetria  MUSCLE STRETCH REFLEXES: DTRs 2+ intact and symmetric  PLANTAR: upgoing/downgoing/mute (Babinski)  CHEST/LUNG: Clear to auscultation bilaterally; no rales, rhonchi, wheezing, or rubs  HEART: +S1/+S2; Regular rate and rhythm; no murmurs, rubs, or gallops  ABDOMEN: Soft, nontender, nondistended; bowel sounds present all four quadrants  EXTREMITIES:  2+ peripheral pulses, no clubbing, cyanosis, or edema  LYMPH: No lymphadenopathy noted  SKIN: Warm, dry; no rashes or lesions    LABS:                        9.5    9.30  )-----------( 262      ( 03 Jun 2021 06:07 )             31.3     06-03    138  |  96<L>  |  42.8<H>  ----------------------------<  94  3.7   |  26.0  |  4.80<H>    Ca    9.0      03 Jun 2021 06:07  Mg     2.0     06-03    TPro  6.8  /  Alb  3.4  /  TBili  0.5  /  DBili  x   /  AST  134<H>  /  ALT  60<H>  /  AlkPhos  114  06-02    PT/INR - ( 02 Jun 2021 12:33 )   PT: 13.2 sec;   INR: 1.15 ratio         PTT - ( 02 Jun 2021 12:33 )  PTT:33.2 sec      CULTURES:      RADIOLOGY & ADDITIONAL STUDIES:  MR Head No Cont (06.03.21 @ 13:53)   FINDINGS:  There is no evidence of acute infarct. Chronic right pontine lacunar infarct. A few small foci of susceptibility artifact in the bilateral cerebral hemispheres suggesting chronic microhemorrhages. Scattered foci of T2/FLAIR hyperintensity in the bilateral hemispheric white matter are nonspecific but likely related to chronic white matter microvascular ischemic disease. There is cerebral volume loss. Trace bilateral frontal subdural collections suggesting subdural hematomas or subdural hygromas.  Flow voids of the major intracranial vessels at the skull base follow expected course and contour.  The paranasal sinuses demonstrate no signal abnormality. The mastoids demonstrate no signal abnormality.  Bilateral orbits are within normal limits.  IMPRESSION:  1.  No acute infarct.  2.  Trace bilateral frontal subdural collections suggesting subdural hematomas or subdural hygromas.    CT Brain Stroke Protocol (06.02.21 @ 12:59)   IMPRESSION:  No evidence of intracranial hemorrhage or mass effect. If clinical suspicion for acute infarct persists, brain MRI can be obtained if there is no contraindication.  CT head findings were discussed with Dr. VARGHESE BAJWA [phone] at 6/2/2021 1:05 PM by Dr. Huber Manriquez with read back confirmation.   Patient is a 87y old  Male who presents with a chief complaint of Lower extremity weakness and elevated trop level (03 Jun 2021 17:43)    HISTORY OF PRESENT ILLNESS:   87 year old Male w/ PMHx of HTN, HLD, CAD, HFpEF, s/p Right CEA for Carotid artery disease, ESRD on HD 2d/week (M/Fri) via LUE fistula, s/p flecainide w/ ILR placed 6/2020, AS s/p TAVR, and PAD (RLE fem-pop bypass October 2020) multiple gastric AVM's s/p cauterized s/p LLE fem-pop bypass, and had the bypass performed on 3/20/21, post procedure developed L femoral DVT and s/p revision of LLE bypass 3/25/21 brought to ED with cc of b/l LE ext weakness with difficulty to ambulate for past 2 days. In addition, per daughter pt has slurred speech since yesterday. In ED CT head negative however has elevated trop. Pt Denies any sob, no chest pain, no n/v or abd pain. Per pt has lower extremity weakness and cant walk. (02 Jun 2021 14:43)  Patient seen and examined this evening. Patient states he slipped out of his wheelchair and hit his head, -LOC. Denies numbness, tingling, HA, changes in vision, bowel/bladder incontinence, saddle anesthesia.     PAST MEDICAL & SURGICAL HISTORY:  DM (diabetes mellitus)  - resolved  AV block, 1st degree  Arrhythmia  CAD (coronary artery disease)  HTN (hypertension)  VT (ventricular tachycardia)  RICHARDS (dyspnea on exertion)  Anemia  Risk factors for obstructive sleep apnea  ESRD on dialysis  HD on Mondays and Fridays  Aortic stenosis  - s/p valve replacement  GI bleeding  due to ulcerated polyps and colonic AVMs  H/O circumcision  at  age  65  H/O left knee surgery  H/O angioplasty  2013,  no  intervention  A-V fistula  left arm 5/2017  H/O carotid endarterectomy  Right  S/P TAVR (transcatheter aortic valve replacement)  History of loop recorder      FAMILY HISTORY:  Family history of cancer (Grandparent)  Family history of lung cancer (Grandparent)  Family history of premature CAD  FH: type 2 diabetes        SOCIAL HISTORY:  Tobacco Use:  EtOH use:   Substance:    Allergies    Plavix (Hives)  Toprol-XL (Rash)    Intolerances        REVIEW OF SYSTEMS  Negative except as noted in HPI  CONSTITUTIONAL: No fever, weight loss, or fatigue  EYES: No eye pain, visual disturbances, or discharge  ENMT:  No difficulty hearing, tinnitus, vertigo; No sinus or throat pain  NECK: No pain or stiffness  BREASTS: No pain, masses, or nipple discharge  RESPIRATORY: No cough, wheezing, chills or hemoptysis; No shortness of breath  CARDIOVASCULAR: No chest pain, palpitations, dizziness, or leg swelling  GASTROINTESTINAL: No abdominal or epigastric pain. No nausea, vomiting, or hematemesis; No diarrhea or constipation. No melena or hematochezia.  GENITOURINARY: No dysuria, frequency, hematuria, or incontinence  NEUROLOGICAL: No headaches, memory loss, loss of strength, numbness, or tremors  SKIN: No itching, burning, rashes, or lesions   LYMPH NODES: No enlarged glands  ENDOCRINE: No heat or cold intolerance; No hair loss  MUSCULOSKELETAL: No joint pain or swelling; No muscle, back, or extremity pain  PSYCHIATRIC: No depression, anxiety, mood swings, or difficulty sleeping  HEME/LYMPH: No easy bruising, or bleeding gums  ALLERY AND IMMUNOLOGIC: No hives or eczema    HOME MEDICATIONS:  Home Medications:  Florastor 250 mg oral capsule: 1 cap(s) orally 2 times a day (02 Jun 2021 14:49)  Lipitor 80 mg oral tablet: 1 tab(s) orally once a day (at bedtime) (02 Jun 2021 14:49)  Nephro-Thomas oral tablet: 1 tab(s) orally once a day (14 Mar 2021 11:10)  NIFEdipine 60 mg oral tablet, extended release: 1 tab(s) orally once a day (at bedtime) (02 Jun 2021 14:49)  NIFEdipine 90 mg oral tablet, extended release: 1 tab(s) orally once a day (02 Jun 2021 14:49)  ocular lubricant ophthalmic solution: 1 drop(s) to each affected eye 2 times a day, As needed, Dry Eyes (08 Apr 2021 09:53)  torsemide 20 mg oral tablet: 1 tab(s) orally on non HD days (02 Jun 2021 14:49)      MEDICATIONS:  Antibiotics:    Neuro:  acetaminophen   Tablet .. 650 milliGRAM(s) Oral once  DULoxetine 60 milliGRAM(s) Oral daily    Anticoagulation:  clopidogrel Tablet 75 milliGRAM(s) Oral daily  heparin  Infusion.  Unit(s)/Hr IV Continuous <Continuous>    OTHER:  atorvastatin 80 milliGRAM(s) Oral at bedtime  epoetin juan-epbx (RETACRIT) Injectable 56498 Unit(s) SubCutaneous <User Schedule>  isosorbide   mononitrate ER Tablet (IMDUR) 30 milliGRAM(s) Oral daily  NIFEdipine XL 90 milliGRAM(s) Oral daily  NIFEdipine XL 60 milliGRAM(s) Oral at bedtime  pantoprazole    Tablet 40 milliGRAM(s) Oral two times a day  tamsulosin 0.4 milliGRAM(s) Oral at bedtime    IVF:      Vital Signs Last 24 Hrs  T(C): 36.7 (03 Jun 2021 15:45), Max: 36.7 (03 Jun 2021 05:18)  T(F): 98.1 (03 Jun 2021 15:45), Max: 98.1 (03 Jun 2021 05:18)  HR: 92 (03 Jun 2021 15:45) (91 - 98)  BP: 144/63 (03 Jun 2021 15:45) (144/63 - 158/76)  BP(mean): 90 (03 Jun 2021 15:45) (90 - 90)  RR: 18 (03 Jun 2021 15:45) (16 - 18)  SpO2: 98% (03 Jun 2021 15:45) (92% - 98%)      PHYSICAL EXAM:  GENERAL: NAD, well-groomed, well-developed  HEAD:  Atraumatic, normocephalic  DRAINS:   WOUND: Dressing clean dry intact; well healed  SHUNT: easily compressible and refills  EYES: Conjunctiva and sclera clear; corneal reflex intact  ENMT: No tonsillar erythema, exudates, or enlargement; moist mucous membranes, good dentition, no lesions  NECK: Supple, no JVD, dormal thyroid  SACHA COMA SCORE: E- V- M- =       E: 4= opens eyes spontaneously 3= to voice 2= to noxious 1= no opening       V: 5= oriented 4= confused 3= inappropriate words 2= incomprehensible sounds 1= nonverbal 1T= intubated       M: 6= follows commands 5= localizes 4= withdraws 3= flexor posturing 2= extensor posturing 1= no movement  MENTAL STATUS: AAO x3; Awake/Comatose; Opens eyes spontaneously/to voice/to light touch/to noxious stimuli; Appropriately conversant without aphasia/Nonverbal; following simple commands/mimicking/not following commands  CRANIAL NERVES: Visual acuity normal for age, visual fields full to confrontation, PERRL. EOMI without nystagmus. Facial sensation intact V1-3 distribution b/l. Face symmetric w/ normal eye closure and smile, tongue midline. Hearing grossly intact. Speech clear. Head turning and shoulder shrug intact.   REFLEXES: PERRL. Corneals intact b/l. Gag intact. Cough intact. Oculocephalic reflex intact (Doll's eye). Negative Schulz's b/l. Negative clonus b/l  MOTOR: strength 5/5 b/l upper and lower extremities  Uppers     Delt (C5/6)     Bicep (C5/6)     Wrist Extend (C6)     Tricep (C7)     HG (C8/T1)  R                     5/5                 5/5                         5/5                           5/5                   5/5  L                      5/5                 5/5                         5/5                           5/5                   5/5  Lowers      HF(L1/L2)     KE (L3)     DF (L4)     EHL (L5)     PF (S1)      R                     5/5              5/5           5/5           5/5            5/5  L                     5/5               5/5          5/5            5/5            5/5  SENSATION: grossly intact to light touch all extremities  COORDINATION: Gait intact; rapid alternating movements intact b/l upper extremities; no upper extremity dysmetria  MUSCLE STRETCH REFLEXES: DTRs 2+ intact and symmetric  PLANTAR: upgoing/downgoing/mute (Babinski)  CHEST/LUNG: Clear to auscultation bilaterally; no rales, rhonchi, wheezing, or rubs  HEART: +S1/+S2; Regular rate and rhythm; no murmurs, rubs, or gallops  ABDOMEN: Soft, nontender, nondistended; bowel sounds present all four quadrants  EXTREMITIES:  2+ peripheral pulses, no clubbing, cyanosis, or edema  LYMPH: No lymphadenopathy noted  SKIN: Warm, dry; no rashes or lesions    LABS:                        9.5    9.30  )-----------( 262      ( 03 Jun 2021 06:07 )             31.3     06-03    138  |  96<L>  |  42.8<H>  ----------------------------<  94  3.7   |  26.0  |  4.80<H>    Ca    9.0      03 Jun 2021 06:07  Mg     2.0     06-03    TPro  6.8  /  Alb  3.4  /  TBili  0.5  /  DBili  x   /  AST  134<H>  /  ALT  60<H>  /  AlkPhos  114  06-02    PT/INR - ( 02 Jun 2021 12:33 )   PT: 13.2 sec;   INR: 1.15 ratio         PTT - ( 02 Jun 2021 12:33 )  PTT:33.2 sec      CULTURES:      RADIOLOGY & ADDITIONAL STUDIES:  MR Head No Cont (06.03.21 @ 13:53)   FINDINGS:  There is no evidence of acute infarct. Chronic right pontine lacunar infarct. A few small foci of susceptibility artifact in the bilateral cerebral hemispheres suggesting chronic microhemorrhages. Scattered foci of T2/FLAIR hyperintensity in the bilateral hemispheric white matter are nonspecific but likely related to chronic white matter microvascular ischemic disease. There is cerebral volume loss. Trace bilateral frontal subdural collections suggesting subdural hematomas or subdural hygromas.  Flow voids of the major intracranial vessels at the skull base follow expected course and contour.  The paranasal sinuses demonstrate no signal abnormality. The mastoids demonstrate no signal abnormality.  Bilateral orbits are within normal limits.  IMPRESSION:  1.  No acute infarct.  2.  Trace bilateral frontal subdural collections suggesting subdural hematomas or subdural hygromas.    CT Brain Stroke Protocol (06.02.21 @ 12:59)   IMPRESSION:  No evidence of intracranial hemorrhage or mass effect. If clinical suspicion for acute infarct persists, brain MRI can be obtained if there is no contraindication.  CT head findings were discussed with Dr. VARGHESE BAJWA [phone] at 6/2/2021 1:05 PM by Dr. Huber Manriquez with read back confirmation.   Patient is a 87y old  Male who presents with a chief complaint of Lower extremity weakness and elevated trop level (03 Jun 2021 17:43)    HISTORY OF PRESENT ILLNESS:   87 year old Male w/ PMHx of HTN, HLD, CAD, HFpEF, s/p Right CEA for Carotid artery disease, ESRD on HD 2d/week (M/Fri) via LUE fistula, s/p flecainide w/ ILR placed 6/2020, AS s/p TAVR, and PAD (RLE fem-pop bypass October 2020) multiple gastric AVM's s/p cauterized s/p LLE fem-pop bypass, and had the bypass performed on 3/20/21, post procedure developed L femoral DVT and s/p revision of LLE bypass 3/25/21 brought to ED with cc of b/l LE ext weakness with difficulty to ambulate for past 2 days. In addition, per daughter pt has slurred speech since yesterday. In ED CT head negative however has elevated trop. Pt Denies any sob, no chest pain, no n/v or abd pain. Per pt has lower extremity weakness and cant walk. (02 Jun 2021 14:43)  Patient seen and examined this evening. Patient states he slipped out of his wheelchair and hit his head, -LOC. Denies numbness, tingling, HA, changes in vision, bowel/bladder incontinence, saddle anesthesia.     PAST MEDICAL & SURGICAL HISTORY:  DM (diabetes mellitus)  - resolved  AV block, 1st degree  Arrhythmia  CAD (coronary artery disease)  HTN (hypertension)  VT (ventricular tachycardia)  RICHARDS (dyspnea on exertion)  Anemia  Risk factors for obstructive sleep apnea  ESRD on dialysis  HD on Mondays and Fridays  Aortic stenosis  - s/p valve replacement  GI bleeding  due to ulcerated polyps and colonic AVMs  H/O circumcision  at  age  65  H/O left knee surgery  H/O angioplasty  2013,  no  intervention  A-V fistula  left arm 5/2017  H/O carotid endarterectomy  Right  S/P TAVR (transcatheter aortic valve replacement)  History of loop recorder      FAMILY HISTORY:  Family history of cancer (Grandparent)  Family history of lung cancer (Grandparent)  Family history of premature CAD  FH: type 2 diabetes        SOCIAL HISTORY:  Tobacco Use:  EtOH use:   Substance:    Allergies    Plavix (Hives)  Toprol-XL (Rash)    Intolerances        REVIEW OF SYSTEMS  Negative except as noted in HPI  CONSTITUTIONAL: No fever, weight loss, or fatigue  EYES: No eye pain, visual disturbances, or discharge  ENMT:  No difficulty hearing, tinnitus, vertigo; No sinus or throat pain  NECK: No pain or stiffness  BREASTS: No pain, masses, or nipple discharge  RESPIRATORY: No cough, wheezing, chills or hemoptysis; No shortness of breath  CARDIOVASCULAR: No chest pain, palpitations, dizziness, or leg swelling  GASTROINTESTINAL: No abdominal or epigastric pain. No nausea, vomiting, or hematemesis; No diarrhea or constipation. No melena or hematochezia.  GENITOURINARY: No dysuria, frequency, hematuria, or incontinence  NEUROLOGICAL: No headaches, memory loss, loss of strength, numbness, or tremors  SKIN: No itching, burning, rashes, or lesions   LYMPH NODES: No enlarged glands  ENDOCRINE: No heat or cold intolerance; No hair loss  MUSCULOSKELETAL: No joint pain or swelling; No muscle, back, or extremity pain  PSYCHIATRIC: No depression, anxiety, mood swings, or difficulty sleeping  HEME/LYMPH: No easy bruising, or bleeding gums  ALLERY AND IMMUNOLOGIC: No hives or eczema    HOME MEDICATIONS:  Home Medications:  Florastor 250 mg oral capsule: 1 cap(s) orally 2 times a day (02 Jun 2021 14:49)  Lipitor 80 mg oral tablet: 1 tab(s) orally once a day (at bedtime) (02 Jun 2021 14:49)  Nephro-Thomas oral tablet: 1 tab(s) orally once a day (14 Mar 2021 11:10)  NIFEdipine 60 mg oral tablet, extended release: 1 tab(s) orally once a day (at bedtime) (02 Jun 2021 14:49)  NIFEdipine 90 mg oral tablet, extended release: 1 tab(s) orally once a day (02 Jun 2021 14:49)  ocular lubricant ophthalmic solution: 1 drop(s) to each affected eye 2 times a day, As needed, Dry Eyes (08 Apr 2021 09:53)  torsemide 20 mg oral tablet: 1 tab(s) orally on non HD days (02 Jun 2021 14:49)      MEDICATIONS:  Antibiotics:    Neuro:  acetaminophen   Tablet .. 650 milliGRAM(s) Oral once  DULoxetine 60 milliGRAM(s) Oral daily    Anticoagulation:  clopidogrel Tablet 75 milliGRAM(s) Oral daily  heparin  Infusion.  Unit(s)/Hr IV Continuous <Continuous>    OTHER:  atorvastatin 80 milliGRAM(s) Oral at bedtime  epoetin juan-epbx (RETACRIT) Injectable 72742 Unit(s) SubCutaneous <User Schedule>  isosorbide   mononitrate ER Tablet (IMDUR) 30 milliGRAM(s) Oral daily  NIFEdipine XL 90 milliGRAM(s) Oral daily  NIFEdipine XL 60 milliGRAM(s) Oral at bedtime  pantoprazole    Tablet 40 milliGRAM(s) Oral two times a day  tamsulosin 0.4 milliGRAM(s) Oral at bedtime    IVF:      Vital Signs Last 24 Hrs  T(C): 36.7 (03 Jun 2021 15:45), Max: 36.7 (03 Jun 2021 05:18)  T(F): 98.1 (03 Jun 2021 15:45), Max: 98.1 (03 Jun 2021 05:18)  HR: 92 (03 Jun 2021 15:45) (91 - 98)  BP: 144/63 (03 Jun 2021 15:45) (144/63 - 158/76)  BP(mean): 90 (03 Jun 2021 15:45) (90 - 90)  RR: 18 (03 Jun 2021 15:45) (16 - 18)  SpO2: 98% (03 Jun 2021 15:45) (92% - 98%)      PHYSICAL EXAM:  GENERAL: NAD, well-groomed, well-developed  HEAD: Atraumatic, normocephalic  SACHA COMA SCORE: E-4 V-5 M-6 = 15  MENTAL STATUS: AAO x3; Awake/Comatose; Opens eyes spontaneously/to voice/to light touch/to noxious stimuli; Appropriately conversant without aphasia/Nonverbal; following simple commands/mimicking/not following commands  CRANIAL NERVES: Visual acuity normal for age, PERRL. EOMI without nystagmus. Facial sensation intact V1-3 distribution b/l. Face symmetric w/ normal eye closure and smile, tongue midline. Hearing grossly intact. Speech clear.   MOTOR: strength 5/5 b/l upper extremities, no drift.  b/l LE 4-/5. DF/PF 5/5. Hip flexion 5/5.  SENSATION: grossly intact to light touch all extremities  CHEST/LUNG: Nonlabored breaths  ABDOMEN: Soft, nontender      LABS:             9.5    9.30  )-----------( 262      ( 03 Jun 2021 06:07 )             31.3     06-03    138  |  96<L>  |  42.8<H>  ----------------------------<  94  3.7   |  26.0  |  4.80<H>    Ca    9.0      03 Jun 2021 06:07  Mg     2.0     06-03    TPro  6.8  /  Alb  3.4  /  TBili  0.5  /  DBili  x   /  AST  134<H>  /  ALT  60<H>  /  AlkPhos  114  06-02    PT/INR - ( 02 Jun 2021 12:33 )   PT: 13.2 sec;   INR: 1.15 ratio         PTT - ( 02 Jun 2021 12:33 )  PTT:33.2 sec      CULTURES:      RADIOLOGY & ADDITIONAL STUDIES:  MR Head No Cont (06.03.21 @ 13:53)   FINDINGS:  There is no evidence of acute infarct. Chronic right pontine lacunar infarct. A few small foci of susceptibility artifact in the bilateral cerebral hemispheres suggesting chronic microhemorrhages. Scattered foci of T2/FLAIR hyperintensity in the bilateral hemispheric white matter are nonspecific but likely related to chronic white matter microvascular ischemic disease. There is cerebral volume loss. Trace bilateral frontal subdural collections suggesting subdural hematomas or subdural hygromas.  Flow voids of the major intracranial vessels at the skull base follow expected course and contour.  The paranasal sinuses demonstrate no signal abnormality. The mastoids demonstrate no signal abnormality.  Bilateral orbits are within normal limits.  IMPRESSION:  1.  No acute infarct.  2.  Trace bilateral frontal subdural collections suggesting subdural hematomas or subdural hygromas.    CT Brain Stroke Protocol (06.02.21 @ 12:59)   IMPRESSION:  No evidence of intracranial hemorrhage or mass effect. If clinical suspicion for acute infarct persists, brain MRI can be obtained if there is no contraindication.  CT head findings were discussed with Dr. VARGHESE BAJWA [phone] at 6/2/2021 1:05 PM by Dr. Huber Manriquez with read back confirmation.   Patient is a 87y old  Male who presents with a chief complaint of Lower extremity weakness and elevated trop level (03 Jun 2021 17:43)    HISTORY OF PRESENT ILLNESS:   87 year old Male w/ PMHx of HTN, HLD, CAD, HFpEF, s/p Right CEA for Carotid artery disease, ESRD on HD 2d/week (M/Fri) via LUE fistula, s/p flecainide w/ ILR placed 6/2020, AS s/p TAVR, and PAD (RLE fem-pop bypass October 2020) multiple gastric AVM's s/p cauterized s/p LLE fem-pop bypass, and had the bypass performed on 3/20/21, post procedure developed L femoral DVT and s/p revision of LLE bypass 3/25/21 brought to ED with cc of b/l LE ext weakness with difficulty to ambulate for past 2 days. In addition, per daughter pt has slurred speech since yesterday. In ED CT head negative however has elevated trop. Pt Denies any sob, no chest pain, no n/v or abd pain. Per pt has lower extremity weakness and cant walk. (02 Jun 2021 14:43)  Patient seen and examined this evening. Patient states he slipped out of his wheelchair and hit his head, -LOC. Believes he lost balance d/t Left foot ulcer. Denies back pain, numbness, tingling, HA, changes in vision, bowel/bladder incontinence, saddle anesthesia.     PAST MEDICAL & SURGICAL HISTORY:  DM (diabetes mellitus)  - resolved  AV block, 1st degree  Arrhythmia  CAD (coronary artery disease)  HTN (hypertension)  VT (ventricular tachycardia)  RICHARDS (dyspnea on exertion)  Anemia  Risk factors for obstructive sleep apnea  ESRD on dialysis  HD on Mondays and Fridays  Aortic stenosis  - s/p valve replacement  GI bleeding  due to ulcerated polyps and colonic AVMs  H/O circumcision  at  age  65  H/O left knee surgery  H/O angioplasty  2013,  no  intervention  A-V fistula  left arm 5/2017  H/O carotid endarterectomy  Right  S/P TAVR (transcatheter aortic valve replacement)  History of loop recorder      FAMILY HISTORY:  Family history of cancer (Grandparent)  Family history of lung cancer (Grandparent)  Family history of premature CAD  FH: type 2 diabetes      Allergies  Plavix (Hives)  Toprol-XL (Rash)          REVIEW OF SYSTEMS  Negative except as noted in HPI    HOME MEDICATIONS:  Home Medications:  Florastor 250 mg oral capsule: 1 cap(s) orally 2 times a day (02 Jun 2021 14:49)  Lipitor 80 mg oral tablet: 1 tab(s) orally once a day (at bedtime) (02 Jun 2021 14:49)  Nephro-Thomas oral tablet: 1 tab(s) orally once a day (14 Mar 2021 11:10)  NIFEdipine 60 mg oral tablet, extended release: 1 tab(s) orally once a day (at bedtime) (02 Jun 2021 14:49)  NIFEdipine 90 mg oral tablet, extended release: 1 tab(s) orally once a day (02 Jun 2021 14:49)  ocular lubricant ophthalmic solution: 1 drop(s) to each affected eye 2 times a day, As needed, Dry Eyes (08 Apr 2021 09:53)  torsemide 20 mg oral tablet: 1 tab(s) orally on non HD days (02 Jun 2021 14:49)      MEDICATIONS:  Antibiotics:    Neuro:  acetaminophen   Tablet .. 650 milliGRAM(s) Oral once  DULoxetine 60 milliGRAM(s) Oral daily    Anticoagulation:  clopidogrel Tablet 75 milliGRAM(s) Oral daily  heparin  Infusion.  Unit(s)/Hr IV Continuous <Continuous>    OTHER:  atorvastatin 80 milliGRAM(s) Oral at bedtime  epoetin juan-epbx (RETACRIT) Injectable 94112 Unit(s) SubCutaneous <User Schedule>  isosorbide   mononitrate ER Tablet (IMDUR) 30 milliGRAM(s) Oral daily  NIFEdipine XL 90 milliGRAM(s) Oral daily  NIFEdipine XL 60 milliGRAM(s) Oral at bedtime  pantoprazole    Tablet 40 milliGRAM(s) Oral two times a day  tamsulosin 0.4 milliGRAM(s) Oral at bedtime    IVF:      Vital Signs Last 24 Hrs  T(C): 36.7 (03 Jun 2021 15:45), Max: 36.7 (03 Jun 2021 05:18)  T(F): 98.1 (03 Jun 2021 15:45), Max: 98.1 (03 Jun 2021 05:18)  HR: 92 (03 Jun 2021 15:45) (91 - 98)  BP: 144/63 (03 Jun 2021 15:45) (144/63 - 158/76)  BP(mean): 90 (03 Jun 2021 15:45) (90 - 90)  RR: 18 (03 Jun 2021 15:45) (16 - 18)  SpO2: 98% (03 Jun 2021 15:45) (92% - 98%)      PHYSICAL EXAM:  GENERAL: NAD, well-groomed, well-developed  HEAD: Atraumatic, normocephalic  SACHA COMA SCORE: E-4 V-4 M-6 = 14  MENTAL STATUS: AAO x3; Awake; Opens eyes spontaneously; conversant with episodes of confusion, following simple commands  CRANIAL NERVES: Visual acuity normal for age, PERRL. EOMI without nystagmus. Facial sensation intact V1-3 distribution b/l. Face symmetric w/ normal eye closure and smile, tongue midline. Hearing grossly intact. Speech clear.   MOTOR: strength 5/5 b/l upper extremities, no drift.  b/l LE 4-/5. DF/PF 5/5. Hip flexion 5/5.  SPINE: Nontender to light palpation in c/t/l spine  EXTREMITIES Left foot wrapped   SENSATION: grossly intact to light touch all extremities  CHEST/LUNG: Nonlabored breaths  ABDOMEN: Soft, nontender      LABS:             9.5    9.30  )-----------( 262      ( 03 Jun 2021 06:07 )             31.3     06-03    138  |  96<L>  |  42.8<H>  ----------------------------<  94  3.7   |  26.0  |  4.80<H>    Ca    9.0      03 Jun 2021 06:07  Mg     2.0     06-03    TPro  6.8  /  Alb  3.4  /  TBili  0.5  /  DBili  x   /  AST  134<H>  /  ALT  60<H>  /  AlkPhos  114  06-02    PT/INR - ( 02 Jun 2021 12:33 )   PT: 13.2 sec;   INR: 1.15 ratio      PTT - ( 02 Jun 2021 12:33 )  PTT:33.2 sec    CULTURES:      RADIOLOGY & ADDITIONAL STUDIES:  MR Head No Cont (06.03.21 @ 13:53)   FINDINGS:  There is no evidence of acute infarct. Chronic right pontine lacunar infarct. A few small foci of susceptibility artifact in the bilateral cerebral hemispheres suggesting chronic microhemorrhages. Scattered foci of T2/FLAIR hyperintensity in the bilateral hemispheric white matter are nonspecific but likely related to chronic white matter microvascular ischemic disease. There is cerebral volume loss. Trace bilateral frontal subdural collections suggesting subdural hematomas or subdural hygromas.  Flow voids of the major intracranial vessels at the skull base follow expected course and contour.  The paranasal sinuses demonstrate no signal abnormality. The mastoids demonstrate no signal abnormality.  Bilateral orbits are within normal limits.  IMPRESSION:  1.  No acute infarct.  2.  Trace bilateral frontal subdural collections suggesting subdural hematomas or subdural hygromas.    CT Brain Stroke Protocol (06.02.21 @ 12:59)   IMPRESSION:  No evidence of intracranial hemorrhage or mass effect. If clinical suspicion for acute infarct persists, brain MRI can be obtained if there is no contraindication.  CT head findings were discussed with Dr. VARGHESE BAJWA [phone] at 6/2/2021 1:05 PM by Dr. Huber Manriquez with read back confirmation.

## 2021-06-03 NOTE — CONSULT NOTE ADULT - ASSESSMENT
Plan  - Neuro checks  - HOB 30 degrees  - Pain control PRN  - Medical team discussed w/ radiology imaging results  - Agree w. radiologist, if AC benefits outweigh risks, heparin drip without bolus is ok. Followed by repeat [facility] in 6 hours  -Medical management/ supportive care per primary team   - D/w Dr. Contreras        Plan  - Neuro checks  - HOB 30 degrees  - Pain control PRN  - Medical team discussed w/ radiology imaging results  - Please obtain MRI Cervical, Thoracic, Lumbar spine. Anticoagulation recommendations pending MRI results.   - Medical management/supportive care per primary team   - D/w with primary team and daughter  - D/w Dr. Contreras  87 year old Male w/ PMHx of HTN, HLD, CAD, HFpEF, s/p Right CEA for Carotid artery disease, ESRD on HD 2d/week (M/Fri) via LUE fistula, s/p flecainide w/ ILR placed 6/2020, AS s/p TAVR, and PAD (RLE fem-pop bypass October 2020) multiple gastric AVM's s/p cauterized s/p LLE fem-pop bypass, and had the bypass performed on 3/20/21, post procedure developed L femoral DVT and s/p revision of LLE bypass 3/25/21 brought to ED with cc of b/l LE ext weakness with difficulty to ambulate for past 2 days. In addition, per daughter pt has slurred speech since yesterday. In ED CT head negative however has elevated trop. Pt Denies any sob, no chest pain, no n/v or abd pain. Per pt has lower extremity weakness and cant walk. (02 Jun 2021 14:43)  MRI Head:   1.  No acute infarct.  2.  Trace bilateral frontal subdural collections suggesting subdural hematomas or subdural hygromas.      Plan  - Neuro checks  - HOB 30 degrees  - Pain control PRN  - Medical team discussed w/ radiology imaging results  - Please obtain MRI Cervical, Thoracic, Lumbar spine. Anticoagulation recommendations pending MRI results.   - Medical management/supportive care per primary team   - D/w with primary team and daughter  - D/w Dr. Contreras  87 year old Male w/ PMHx of HTN, HLD, CAD, HFpEF, s/p Right CEA for Carotid artery disease, ESRD on HD 2d/week (M/Fri) via LUE fistula, s/p flecainide w/ ILR placed 6/2020, AS s/p TAVR, and PAD (RLE fem-pop bypass October 2 020) multiple gastric AVM's s/p cauterized s/p LLE fem-pop bypass, and had the bypass performed on 3/20/21, post procedure developed L femoral DVT and s/p revision of LLE bypass 3/25/21 brought to ED with cc of b/l LE ext weakness with difficulty to ambulate for past 2 days. In addition, per daughter pt has slurred speech since yesterday. In ED CT head negative however has elevated trop. Pt Denies any sob, no chest pain, no n/v or abd pain. Per pt has lower extremity weakness and cant walk. (02 Jun 2021 14:43)  MRI Head:   1.  No acute infarct.  2.  Trace bilateral frontal subdural collections suggesting subdural hematomas or subdural hygromas.      Plan  - Neuro checks  - HOB 30 degrees  - Pain control PRN  - Medical team discussed w/ radiology imaging results  - Please obtain MRI Cervical, Thoracic, Lumbar spine. Anticoagulation recommendations pending MRI results.   - Medical management/supportive care per primary team   - D/w with primary team and daughter  - D/w Dr. Contreras     --    NSGY Attg:    see above    patient seen and examined by PA staff    CT and MRI brain reviewed -- discussed with radiologist by hospitalist (Dr. Vargas) -- CT head without evidence of acute ICH; MRI with FLAIR change likely consistent with small chronic hygroma    given recent exacerbation of LE weakness, recommend MRI CTLS spine prior to starting AC to ensure that there is no EDH that accounts for worsening gait that may be exacerbated with AC

## 2021-06-03 NOTE — PHYSICAL THERAPY INITIAL EVALUATION ADULT - WEIGHT-BEARING RESTRICTIONS: SIT/STAND, REHAB EVAL
Pt having difficulty weight bearing on LLE, when left foot made contact with floor, pt unable fully place foot down, left foot remained in plantarflexed position throughout. Pt reports increased pain with attempts to dorsiflex foot./weight-bearing as tolerated

## 2021-06-03 NOTE — PROGRESS NOTE ADULT - ASSESSMENT
ESRD on HD- twice weekly schedule ([facility] outpt)- will schedule for HD tomorrow.  Anemia: Hb below goal,  continue AMY  leg weakness/ Slurred speech- pending MRI brain results. Neurology on board  BP stable; continue current regimen    will follow

## 2021-06-03 NOTE — PROGRESS NOTE ADULT - SUBJECTIVE AND OBJECTIVE BOX
[facility] DIVISION OF KIDNEY DISEASES AND HYPERTENSION -- FOLLOW UP NOTE  --------------------------------------------------------------------------------  Chief Complaint: ESRD on HD    24 hour events/subjective:Pt seen and examined; still unable to walk        PAST HISTORY  --------------------------------------------------------------------------------  No significant changes to PMH, PSH, FHx, SHx, unless otherwise noted    ALLERGIES & MEDICATIONS  --------------------------------------------------------------------------------  Allergies    Plavix (Hives)  Toprol-XL (Rash)    Intolerances      Standing Inpatient Medications  acetaminophen   Tablet .. 650 milliGRAM(s) Oral once  atorvastatin 80 milliGRAM(s) Oral at bedtime  clopidogrel Tablet 75 milliGRAM(s) Oral daily  DULoxetine 60 milliGRAM(s) Oral daily  epoetin juan-epbx (RETACRIT) Injectable 17726 Unit(s) SubCutaneous <User Schedule>  heparin   Injectable 5000 Unit(s) SubCutaneous every 12 hours  isosorbide   mononitrate ER Tablet (IMDUR) 30 milliGRAM(s) Oral daily  NIFEdipine XL 90 milliGRAM(s) Oral daily  NIFEdipine XL 60 milliGRAM(s) Oral at bedtime  pantoprazole    Tablet 40 milliGRAM(s) Oral two times a day  tamsulosin 0.4 milliGRAM(s) Oral at bedtime    PRN Inpatient Medications      REVIEW OF SYSTEMS  --------------------------------------------------------------------------------  Gen: No weight changes, fatigue, fevers/chills, weakness  Skin: No rashes  Head/Eyes/Ears/Mouth: No headache; Normal hearing; Normal vision w/o blurriness; No sinus pain/discomfort, sore throat  Respiratory: No dyspnea, cough, wheezing, hemoptysis  CV: No chest pain, PND, orthopnea  GI: No abdominal pain, diarrhea, constipation, nausea, vomiting, melena, hematochezia  : No increased frequency, dysuria, hematuria, nocturia  MSK: inability towalk   Neuro: slurred speech  Heme: No easy bruising or bleeding  Endo: No heat/cold intolerance  Psych: No significant nervousness, anxiety, stress, depression    All other systems were reviewed and are negative, except as noted.    VITALS/PHYSICAL EXAM  --------------------------------------------------------------------------------  T(C): 36.6 (06-03-21 @ 09:40), Max: 36.9 (06-02-21 @ 18:55)  HR: 91 (06-03-21 @ 09:40) (78 - 98)  BP: 152/74 (06-03-21 @ 09:40) (147/69 - 158/76)  RR: 17 (06-03-21 @ 09:40) (15 - 18)  SpO2: 95% (06-03-21 @ 09:40) (92% - 97%)  Wt(kg): --  Height (cm): 165.1 (06-02-21 @ 22:45)  Weight (kg): 68.2 (06-02-21 @ 22:45)  BMI (kg/m2): 25 (06-02-21 @ 22:45)  BSA (m2): 1.75 (06-02-21 @ 22:45)      Physical Exam:  	Gen: NAD, thin elderly man  	HEENT: PERRL  	Pulm: CTA B/L  	CV: RRR, S1S2; no rub  	Back:  no sacral edema  	Abd: +BS, soft, nontender/nondistended  	: No suprapubic tenderness  	UE: Warm, ; no edema; no asterixis  	LE: Warm; no edema  	Psych: Normal affect and mood  	Skin: Warm, without rashes  	Vascular access: AVF    LABS/STUDIES  --------------------------------------------------------------------------------              9.5    9.30  >-----------<  262      [06-03-21 @ 06:07]              31.3     138  |  96  |  42.8  ----------------------------<  94      [06-03-21 @ 06:07]  3.7   |  26.0  |  4.80        Ca     9.0     [06-03-21 @ 06:07]      Mg     2.0     [06-03-21 @ 06:07]    TPro  6.8  /  Alb  3.4  /  TBili  0.5  /  DBili  x   /  AST  134  /  ALT  60  /  AlkPhos  114  [06-02-21 @ 12:33]    PT/INR: PT 13.2 , INR 1.15       [06-02-21 @ 12:33]  PTT: 33.2       [06-02-21 @ 12:33]    Troponin 0.51      [06-02-21 @ 19:11]    Creatinine Trend:  SCr 4.80 [06-03 @ 06:07]  SCr 4.33 [06-02 @ 12:33]        Iron 53, TIBC 266, %sat 20      [08-19-20 @ 06:32]  Ferritin 184      [08-19-20 @ 06:32]  PTH -- (Ca 9.6)      [08-19-20 @ 16:08]   91  Vitamin D (25OH) 26.4      [08-19-20 @ 16:08]  HbA1c 4.9      [08-09-18 @ 05:54]  TSH 2.16      [09-15-20 @ 02:01]  Lipid: chol 126, , HDL 60, LDL --      [06-03-21 @ 06:07]

## 2021-06-03 NOTE — PROGRESS NOTE ADULT - SUBJECTIVE AND OBJECTIVE BOX
[facility]                                     Neurology at [place]                                 [address]                                  [address]                                        [place], [place], [address]                                             [phone]    CC: Gait difficulty  HPI:  The patient is a 87y Male who presented with difficulty walking for 3 days.  He has history of multiple surgeries for PAD on his lower extremities, in March he had left fem-pop bypass complicated by DVT and went to rehab.  He was able to walk about 15-20 feet with a walker until it was too painful and he had to stop.  For the past three days he has noticed worsening b/l foot numbness with difficulty walking and dragging his feet.  Neurology is asked to evaluate.    Interval history: still with gait difficulty    Review of systems (neurology): Denies headache or dizziness. (+) BLE weakness/numbness.  Denies speech/language deficits. Denies diplopia/blurred vision.  Denies confusion    MEDICATIONS  (STANDING):  atorvastatin 80 milliGRAM(s) Oral at bedtime  clopidogrel Tablet 75 milliGRAM(s) Oral daily  DULoxetine 60 milliGRAM(s) Oral daily  epoetin juan-epbx (RETACRIT) Injectable 69273 Unit(s) SubCutaneous <User Schedule>  isosorbide   mononitrate ER Tablet (IMDUR) 30 milliGRAM(s) Oral daily  NIFEdipine XL 90 milliGRAM(s) Oral daily  NIFEdipine XL 60 milliGRAM(s) Oral at bedtime  pantoprazole    Tablet 40 milliGRAM(s) Oral two times a day  tamsulosin 0.4 milliGRAM(s) Oral at bedtime    MEDICATIONS  (PRN):      Vital Signs Last 24 Hrs  T(C): 36.4 (03 Jun 2021 21:12), Max: 36.7 (03 Jun 2021 05:18)  T(F): 97.5 (03 Jun 2021 21:12), Max: 98.1 (03 Jun 2021 05:18)  HR: 88 (03 Jun 2021 21:12) (88 - 98)  BP: 167/71 (03 Jun 2021 21:12) (144/63 - 167/71)  BP(mean): 90 (03 Jun 2021 15:45) (90 - 90)  RR: 16 (03 Jun 2021 21:12) (16 - 18)  SpO2: 98% (03 Jun 2021 15:45) (92% - 98%)    Detailed Neurologic Exam:    Mental status: The patient is awake and alert and has normal attention span.  The patient is fully oriented.  The patient is able to name objects, follow commands, repeat sentences.    Cranial nerves: Pupils equal and react symmetrically to light. There is no visual field deficit to confrontation. Extraocular motion is full with no nystagmus. There is no ptosis. Facial sensation is intact. Facial musculature is symmetric. Palate elevates symmetrically.  Tongue is midline.    Motor: There is normal bulk and tone.  There is no tremor.  Strength is 5/5 in the right arm and 4+/5leg.   Strength is 5/5 in the left arm and 4-/5 leg.    Sensation: Intact to light touch and pin in 4 extremities, except for decreased at both feet    Reflexes: 1+ throughout and plantar responses are equivocal.    Cerebellar: There is no dysmetria on finger to nose testing.    Gait : deferred    LABS:                                    9.5    9.30  )-----------( 262      ( 03 Jun 2021 06:07 )             31.3     06-03    138  |  96<L>  |  42.8<H>  ----------------------------<  94  3.7   |  26.0  |  4.80<H>    Ca    9.0      03 Jun 2021 06:07  Mg     2.0     06-03    TPro  6.8  /  Alb  3.4  /  TBili  0.5  /  DBili  x   /  AST  134<H>  /  ALT  60<H>  /  AlkPhos  114  06-02    LIVER FUNCTIONS - ( 02 Jun 2021 12:33 )  Alb: 3.4 g/dL / Pro: 6.8 g/dL / ALK PHOS: 114 U/L / ALT: 60 U/L / AST: 134 U/L / GGT: x           PT/INR - ( 02 Jun 2021 12:33 )   PT: 13.2 sec;   INR: 1.15 ratio         PTT - ( 02 Jun 2021 12:33 )  PTT:33.2 sec    RADIOLOGY & ADDITIONAL STUDIES (independently reviewed unless otherwise noted):  MRI brain no acute CVA, mass or blood.  (+) b/l subdural hygromas    LE duplex showed extension of left LE DVT    Head CT no acute CVA, mass or blood

## 2021-06-03 NOTE — PROGRESS NOTE ADULT - ASSESSMENT
85 y/o M with PMHx of HTN, HLD, CAD, HFpEF, s/p Right CEA for Carotid artery disease, ESRD on HD 2d/week (M/Fri) via LUE fistula, s/p flecainide w/ ILR placed 6/2020, AS s/p TAVR, and PAD (RLE fem-pop bypass October 2020) multiple gastric AVM's s/p cauterized s/p LLE fem-pop bypass, and had the bypass performed on 3/20/21, post procedure developed L femoral DVT and s/p revision of LLE bypass 3/25/21 brought to ED with cc of b/l LE ext weakness with difficulty to ambulate for past 2 days. In addition, per daughter pt has slurred speech since yesterday. In ED CT head negative however has elevated trop. Pt Denies any sob, no chest pain, no n/v or abd pain. Per pt has lower extremity weakness and cant walk. (02 Jun 2021 14:43)    #R/O CVA in setting of b/l LE weakness and slurred speech  -CT head negative for acute pathology  -MRI pending   -PT eval pending   -Continue Lipitor 80mg po qhs and Plavix 75mg daily   -Neurology consult appreciated     #Elevated Troponin levels in setting of ESRD  -Asymptomatic   -EKG: NSR, rate 82/min, first degree AV block   -Continue telemetry monitoring   -Cardiology consult appreciated     #B/L LE edema, L> R  -Venous duplex b/l LE ordered to r/o DVT    #Generalized weakness, leg weakness    -PT eval pending   -May be due to deconditioning, pt admits to being sedentary, mostly in bed or wheel chair at home     #PVD/HLD/HTN  -continue Plavix statin, nifedipine Imdur    #ESRD on HD  -Continue HD  -Nephrology consult appreciated     #Depression  -Continue duloxetine    #Hx of GI bleed due to gastric AVMs  -Continue PPI  -h/h stable     #VTE prophylaxis  -Heparin subQ     DISPO: Pending MRI, Venous doppler b/l LE         Above discussed with Dr. Vargas      87 y/o M with PMHx of HTN, HLD, CAD, HFpEF, s/p Right CEA for Carotid artery disease, ESRD on HD 2d/week (M/Fri) via LUE fistula, s/p flecainide w/ ILR placed 6/2020, AS s/p TAVR, and PAD (RLE fem-pop bypass October 2020) multiple gastric AVM's s/p cauterized s/p LLE fem-pop bypass, and had the bypass performed on 3/20/21, post procedure developed L femoral DVT and s/p revision of LLE bypass 3/25/21 brought to ED with cc of b/l LE ext weakness with difficulty to ambulate for past 2 days. In addition, per daughter pt has slurred speech since yesterday. In ED CT head negative however has elevated trop. Pt Denies any sob, no chest pain, no n/v or abd pain. Per pt has lower extremity weakness and cant walk. (02 Jun 2021 14:43)    #R/O CVA in setting of b/l LE weakness and slurred speech  -CT head negative for acute pathology  -MRI head pending  -PT eval pending   -Continue Lipitor 80mg po qhs and Plavix 75mg daily   -Neurology consult appreciated    #Elevated Troponin levels in setting of ESRD  -Asymptomatic   -EKG: NSR, rate 82/min, first degree AV block   -Continue telemetry monitoring   -Cardiology consult appreciated     #B/L LE edema, L> R  -Venous duplex b/l LE ordered to r/o propagation of DVT    #Generalized weakness, leg weakness    -PT eval pending   -likely due to LLE edema that is prominent    #PVD/HLD/HTN  -continue Plavix statin, nifedipine Imdur    #ESRD on HD  -Continue HD  -Nephrology consult appreciated     #Depression  -Continue duloxetine    #Hx of GI bleed due to gastric AVMs  -Continue PPI  -h/h stable     #VTE prophylaxis  -Heparin subQ     DISPO: pending doppler study    Above discussed with Dr. Vargas

## 2021-06-03 NOTE — PHYSICAL THERAPY INITIAL EVALUATION ADULT - ADDITIONAL COMMENTS
Pt lives with his wife, family lives near by. Pt reports that someone in the family drives him to his appointments. He has 3 steps to enter the home with a single hand rail. Pt reports ambulating independently with RW at baseline.

## 2021-06-04 ENCOUNTER — TRANSCRIPTION ENCOUNTER (OUTPATIENT)
Age: 86
End: 2021-06-04

## 2021-06-04 LAB
ALBUMIN SERPL ELPH-MCNC: 3 G/DL — LOW (ref 3.3–5.2)
ALP SERPL-CCNC: 102 U/L — SIGNIFICANT CHANGE UP (ref 40–120)
ALT FLD-CCNC: 76 U/L — HIGH
ANION GAP SERPL CALC-SCNC: 17 MMOL/L — SIGNIFICANT CHANGE UP (ref 5–17)
APPEARANCE UR: CLEAR — SIGNIFICANT CHANGE UP
APTT BLD: 64.5 SEC — HIGH (ref 27.5–35.5)
AST SERPL-CCNC: 161 U/L — HIGH
BACTERIA # UR AUTO: NEGATIVE — SIGNIFICANT CHANGE UP
BILIRUB DIRECT SERPL-MCNC: 0.2 MG/DL — SIGNIFICANT CHANGE UP (ref 0–0.3)
BILIRUB INDIRECT FLD-MCNC: 0.3 MG/DL — SIGNIFICANT CHANGE UP (ref 0.2–1)
BILIRUB SERPL-MCNC: 0.5 MG/DL — SIGNIFICANT CHANGE UP (ref 0.4–2)
BILIRUB UR-MCNC: NEGATIVE — SIGNIFICANT CHANGE UP
BUN SERPL-MCNC: 50.5 MG/DL — HIGH (ref 8–20)
CALCIUM SERPL-MCNC: 9.1 MG/DL — SIGNIFICANT CHANGE UP (ref 8.6–10.2)
CHLORIDE SERPL-SCNC: 99 MMOL/L — SIGNIFICANT CHANGE UP (ref 98–107)
CO2 SERPL-SCNC: 24 MMOL/L — SIGNIFICANT CHANGE UP (ref 22–29)
COLOR SPEC: YELLOW — SIGNIFICANT CHANGE UP
CREAT SERPL-MCNC: 5.66 MG/DL — HIGH (ref 0.5–1.3)
DIFF PNL FLD: ABNORMAL
EPI CELLS # UR: NEGATIVE — SIGNIFICANT CHANGE UP
GLUCOSE SERPL-MCNC: 85 MG/DL — SIGNIFICANT CHANGE UP (ref 70–99)
GLUCOSE UR QL: NEGATIVE MG/DL — SIGNIFICANT CHANGE UP
HCT VFR BLD CALC: 31.2 % — LOW (ref 39–50)
HCT VFR BLD CALC: 34.1 % — LOW (ref 39–50)
HGB BLD-MCNC: 10.5 G/DL — LOW (ref 13–17)
HGB BLD-MCNC: 9.6 G/DL — LOW (ref 13–17)
KETONES UR-MCNC: NEGATIVE — SIGNIFICANT CHANGE UP
LEUKOCYTE ESTERASE UR-ACNC: NEGATIVE — SIGNIFICANT CHANGE UP
MCHC RBC-ENTMCNC: 29.1 PG — SIGNIFICANT CHANGE UP (ref 27–34)
MCHC RBC-ENTMCNC: 29.1 PG — SIGNIFICANT CHANGE UP (ref 27–34)
MCHC RBC-ENTMCNC: 30.8 GM/DL — LOW (ref 32–36)
MCHC RBC-ENTMCNC: 30.8 GM/DL — LOW (ref 32–36)
MCV RBC AUTO: 94.5 FL — SIGNIFICANT CHANGE UP (ref 80–100)
MCV RBC AUTO: 94.5 FL — SIGNIFICANT CHANGE UP (ref 80–100)
NITRITE UR-MCNC: NEGATIVE — SIGNIFICANT CHANGE UP
PH UR: 8 — SIGNIFICANT CHANGE UP (ref 5–8)
PLATELET # BLD AUTO: 253 K/UL — SIGNIFICANT CHANGE UP (ref 150–400)
PLATELET # BLD AUTO: 269 K/UL — SIGNIFICANT CHANGE UP (ref 150–400)
POTASSIUM SERPL-MCNC: 3.9 MMOL/L — SIGNIFICANT CHANGE UP (ref 3.5–5.3)
POTASSIUM SERPL-SCNC: 3.9 MMOL/L — SIGNIFICANT CHANGE UP (ref 3.5–5.3)
PROT SERPL-MCNC: 6.2 G/DL — LOW (ref 6.6–8.7)
PROT UR-MCNC: 100 MG/DL
RBC # BLD: 3.3 M/UL — LOW (ref 4.2–5.8)
RBC # BLD: 3.61 M/UL — LOW (ref 4.2–5.8)
RBC # FLD: 18.7 % — HIGH (ref 10.3–14.5)
RBC # FLD: 18.8 % — HIGH (ref 10.3–14.5)
RBC CASTS # UR COMP ASSIST: ABNORMAL /HPF (ref 0–4)
SODIUM SERPL-SCNC: 140 MMOL/L — SIGNIFICANT CHANGE UP (ref 135–145)
SP GR SPEC: 1.01 — SIGNIFICANT CHANGE UP (ref 1.01–1.02)
UROBILINOGEN FLD QL: NEGATIVE MG/DL — SIGNIFICANT CHANGE UP
WBC # BLD: 8.4 K/UL — SIGNIFICANT CHANGE UP (ref 3.8–10.5)
WBC # BLD: 8.67 K/UL — SIGNIFICANT CHANGE UP (ref 3.8–10.5)
WBC # FLD AUTO: 8.4 K/UL — SIGNIFICANT CHANGE UP (ref 3.8–10.5)
WBC # FLD AUTO: 8.67 K/UL — SIGNIFICANT CHANGE UP (ref 3.8–10.5)
WBC UR QL: SIGNIFICANT CHANGE UP

## 2021-06-04 PROCEDURE — 99233 SBSQ HOSP IP/OBS HIGH 50: CPT

## 2021-06-04 PROCEDURE — 99232 SBSQ HOSP IP/OBS MODERATE 35: CPT

## 2021-06-04 PROCEDURE — 99221 1ST HOSP IP/OBS SF/LOW 40: CPT

## 2021-06-04 PROCEDURE — 72148 MRI LUMBAR SPINE W/O DYE: CPT | Mod: 26

## 2021-06-04 PROCEDURE — 72146 MRI CHEST SPINE W/O DYE: CPT | Mod: 26

## 2021-06-04 PROCEDURE — 90937 HEMODIALYSIS REPEATED EVAL: CPT

## 2021-06-04 RX ORDER — ACETAMINOPHEN 500 MG
650 TABLET ORAL ONCE
Refills: 0 | Status: COMPLETED | OUTPATIENT
Start: 2021-06-04 | End: 2021-06-04

## 2021-06-04 RX ORDER — HYDRALAZINE HCL 50 MG
10 TABLET ORAL ONCE
Refills: 0 | Status: COMPLETED | OUTPATIENT
Start: 2021-06-04 | End: 2021-06-04

## 2021-06-04 RX ORDER — HEPARIN SODIUM 5000 [USP'U]/ML
INJECTION INTRAVENOUS; SUBCUTANEOUS
Qty: 25000 | Refills: 0 | Status: DISCONTINUED | OUTPATIENT
Start: 2021-06-04 | End: 2021-06-08

## 2021-06-04 RX ORDER — TRAMADOL HYDROCHLORIDE 50 MG/1
25 TABLET ORAL ONCE
Refills: 0 | Status: DISCONTINUED | OUTPATIENT
Start: 2021-06-04 | End: 2021-06-04

## 2021-06-04 RX ADMIN — ISOSORBIDE MONONITRATE 30 MILLIGRAM(S): 60 TABLET, EXTENDED RELEASE ORAL at 13:01

## 2021-06-04 RX ADMIN — Medication 90 MILLIGRAM(S): at 05:11

## 2021-06-04 RX ADMIN — Medication 650 MILLIGRAM(S): at 08:31

## 2021-06-04 RX ADMIN — Medication 20 MILLIGRAM(S): at 08:32

## 2021-06-04 RX ADMIN — PANTOPRAZOLE SODIUM 40 MILLIGRAM(S): 20 TABLET, DELAYED RELEASE ORAL at 05:11

## 2021-06-04 RX ADMIN — TAMSULOSIN HYDROCHLORIDE 0.4 MILLIGRAM(S): 0.4 CAPSULE ORAL at 21:20

## 2021-06-04 RX ADMIN — ERYTHROPOIETIN 10000 UNIT(S): 10000 INJECTION, SOLUTION INTRAVENOUS; SUBCUTANEOUS at 15:35

## 2021-06-04 RX ADMIN — Medication 60 MILLIGRAM(S): at 21:20

## 2021-06-04 RX ADMIN — DULOXETINE HYDROCHLORIDE 60 MILLIGRAM(S): 30 CAPSULE, DELAYED RELEASE ORAL at 13:01

## 2021-06-04 RX ADMIN — CLOPIDOGREL BISULFATE 75 MILLIGRAM(S): 75 TABLET, FILM COATED ORAL at 13:01

## 2021-06-04 RX ADMIN — PANTOPRAZOLE SODIUM 40 MILLIGRAM(S): 20 TABLET, DELAYED RELEASE ORAL at 18:22

## 2021-06-04 RX ADMIN — Medication 10 MILLIGRAM(S): at 22:45

## 2021-06-04 RX ADMIN — HEPARIN SODIUM 1200 UNIT(S)/HR: 5000 INJECTION INTRAVENOUS; SUBCUTANEOUS at 12:42

## 2021-06-04 RX ADMIN — Medication 650 MILLIGRAM(S): at 09:31

## 2021-06-04 RX ADMIN — HEPARIN SODIUM 1200 UNIT(S)/HR: 5000 INJECTION INTRAVENOUS; SUBCUTANEOUS at 19:31

## 2021-06-04 RX ADMIN — ATORVASTATIN CALCIUM 80 MILLIGRAM(S): 80 TABLET, FILM COATED ORAL at 21:20

## 2021-06-04 NOTE — PROGRESS NOTE ADULT - SUBJECTIVE AND OBJECTIVE BOX
[facility] DIVISION OF KIDNEY DISEASES AND HYPERTENSION -- HEMODIALYSIS NOTE  --------------------------------------------------------------------------------  Chief Complaint: ESRD/Ongoing hemodialysis requirement    24 hour events/subjective:  HD today      PAST HISTORY  --------------------------------------------------------------------------------  No significant changes to PMH, PSH, FHx, SHx, unless otherwise noted    ALLERGIES & MEDICATIONS  --------------------------------------------------------------------------------  Allergies    Plavix (Hives)  Toprol-XL (Rash)    Intolerances      Standing Inpatient Medications  atorvastatin 80 milliGRAM(s) Oral at bedtime  clopidogrel Tablet 75 milliGRAM(s) Oral daily  DULoxetine 60 milliGRAM(s) Oral daily  epoetin juan-epbx (RETACRIT) Injectable 96958 Unit(s) SubCutaneous <User Schedule>  heparin  Infusion.  Unit(s)/Hr IV Continuous <Continuous>  isosorbide   mononitrate ER Tablet (IMDUR) 30 milliGRAM(s) Oral daily  NIFEdipine XL 90 milliGRAM(s) Oral daily  NIFEdipine XL 60 milliGRAM(s) Oral at bedtime  pantoprazole    Tablet 40 milliGRAM(s) Oral two times a day  tamsulosin 0.4 milliGRAM(s) Oral at bedtime  torsemide 20 milliGRAM(s) Oral <User Schedule>    PRN Inpatient Medications      REVIEW OF SYSTEMS  --------------------------------------------------------------------------------  Gen: No weight changes, fatigue, fevers/chills, weakness  Skin: No rashes  Head/Eyes/Ears/Mouth: No headache; Normal hearing; Normal vision w/o blurriness; No sinus pain/discomfort, sore throat  Respiratory: No dyspnea, cough, wheezing, hemoptysis  CV: No chest pain, PND, orthopnea  GI: No abdominal pain, diarrhea, constipation, nausea, vomiting, melena, hematochezia  : No increased frequency, dysuria, hematuria, nocturia  MSK: inability towalk   Neuro: slurred speech  Heme: No easy bruising or bleeding  Endo: No heat/cold intolerance  Psych: No significant nervousness, anxiety, stress, depression    All other systems were reviewed and are negative, except as noted.      VITALS/PHYSICAL EXAM  --------------------------------------------------------------------------------  T(C): 36.7 (06-04-21 @ 13:35), Max: 36.9 (06-04-21 @ 01:39)  HR: 74 (06-04-21 @ 13:35) (74 - 95)  BP: 148/69 (06-04-21 @ 13:35) (133/56 - 167/71)  RR: 18 (06-04-21 @ 13:35) (16 - 18)  SpO2: 97% (06-04-21 @ 13:35) (91% - 98%)  Wt(kg): --  Height (cm): 165.1 (06-02-21 @ 22:45)  Weight (kg): 68.2 (06-02-21 @ 22:45)  BMI (kg/m2): 25 (06-02-21 @ 22:45)  BSA (m2): 1.75 (06-02-21 @ 22:45)      06-03-21 @ 07:01  -  06-04-21 @ 07:00  --------------------------------------------------------  IN: 360 mL / OUT: 68 mL / NET: 292 mL    06-04-21 @ 07:01  -  06-04-21 @ 13:42  --------------------------------------------------------  IN: 132 mL / OUT: 1 mL / NET: 131 mL      Physical Exam:  	Gen: NAD, thin elderly man  	HEENT: PERRL  	Pulm: CTA B/L  	CV: RRR, S1S2; no rub  	Back:  no sacral edema  	Abd: +BS, soft, nontender/nondistended  	: No suprapubic tenderness  	UE: Warm, ; no edema; no asterixis  	LE: Warm; no edema  	Psych: Normal affect and mood  	Skin: Warm, without rashes  	Vascular access: AVF      LABS/STUDIES  --------------------------------------------------------------------------------              9.6    8.67  >-----------<  253      [06-04-21 @ 06:27]              31.2     140  |  99  |  50.5  ----------------------------<  85      [06-04-21 @ 06:27]  3.9   |  24.0  |  5.66        Ca     9.1     [06-04-21 @ 06:27]      Mg     2.0     [06-03-21 @ 06:07]    TPro  6.2  /  Alb  3.0  /  TBili  0.5  /  DBili  0.2  /  AST  161  /  ALT  76  /  AlkPhos  102  [06-04-21 @ 06:27]        Troponin 0.51      [06-02-21 @ 19:11]    Iron 53, TIBC 266, %sat 20      [08-19-20 @ 06:32]  Ferritin 184      [08-19-20 @ 06:32]  PTH -- (Ca 9.6)      [08-19-20 @ 16:08]   91  Vitamin D (25OH) 26.4      [08-19-20 @ 16:08]  HbA1c 4.9      [08-09-18 @ 05:54]  TSH 2.16      [09-15-20 @ 02:01]  Lipid: chol 126, , HDL 60, LDL --      [06-03-21 @ 06:07]

## 2021-06-04 NOTE — PROGRESS NOTE ADULT - ASSESSMENT
ASSESSMENT: Patient is a 87y old male with new extension of LLE thrombus into the left femoral vein. MRI suggesting subdural hematomas vs hygromas.     PLAN:    - From vascular surgery standpoint patient needs anticoagulation with heparin drip pending neurosurgery clearance given possible SDH on MRI.    ASSESSMENT: Patient is a 87y old male with new extension of LLE thrombus into the left femoral vein. MRI suggesting subdural hematomas vs hygromas.     PLAN:    - From vascular surgery standpoint patient needs anticoagulation with heparin drip pending neurosurgery clearance given possible SDH on MRI.   - If Neurosurgery does not clear for AC we recommend IVC filter placement by IR.   We will sign off at this point. Please call with questions.     THank you for the opportunity of taking care of your patient.     Discussed with Dr. Mittal whom agrees.

## 2021-06-04 NOTE — PROGRESS NOTE ADULT - SUBJECTIVE AND OBJECTIVE BOX
HPI:   87 year old Male w/ PMHx of HTN, HLD, CAD, HFpEF, s/p Right CEA for Carotid artery disease, ESRD on HD 2d/week (M/Fri) via LUE fistula, s/p flecainide w/ ILR placed 6/2020, AS s/p TAVR, and PAD (RLE fem-pop bypass October 2020) multiple gastric AVM's s/p cauterized s/p LLE fem-pop bypass, and had the bypass performed on 3/20/21, post procedure developed L femoral DVT and s/p revision of LLE bypass 3/25/21 brought to ED with cc of b/l LE ext weakness with difficulty to ambulate for past 2 days. In addition, per daughter pt has slurred speech since yesterday. In ED CT head negative however has elevated trop. Pt Denies any sob, no chest pain, no n/v or abd pain. Per pt has lower extremity weakness and cant walk. (02 Jun 2021 14:43)  Patient seen and examined this evening. Patient states he slipped out of his wheelchair and hit his head, -LOC. Believes he lost balance d/t Left foot ulcer. Denies back pain, numbness, tingling, HA, changes in vision, bowel/bladder incontinence, saddle anesthesia.     Interval History: patient with MRI today shows no epidural hematoma.     Vital Signs Last 24 Hrs  T(C): 36.7 (04 Jun 2021 13:35), Max: 36.9 (04 Jun 2021 01:39)  T(F): 98.1 (04 Jun 2021 13:35), Max: 98.5 (04 Jun 2021 01:39)  HR: 74 (04 Jun 2021 13:35) (74 - 95)  BP: 148/69 (04 Jun 2021 13:35) (133/56 - 167/71)  BP(mean): 90 (03 Jun 2021 15:45) (90 - 90)  RR: 18 (04 Jun 2021 13:35) (16 - 18)  SpO2: 97% (04 Jun 2021 13:35) (91% - 98%)    PHYSICAL EXAM:  GENERAL: NAD  SACHA COMA SCORE: E-4 V-4 M-6 = 14  MENTAL STATUS: AAO x2; Awake; Opens eyes spontaneously; conversant with episodes of confusion, following simple commands  CRANIAL NERVES: Visual acuity normal for age, PERRL. EOMI without nystagmus. Facial sensation intact V1-3 distribution b/l. Face symmetric w/ normal eye closure and smile, tongue midline. Hearing grossly intact. Speech clear.   MOTOR: strength 5/5 b/l upper extremities, no drift.  b/l LE 4-/5. DF/PF 5/5. Hip flexion 5/5.  SPINE: Nontender to light palpation in c/t/l spine  EXTREMITIES Left foot wrapped   SENSATION: grossly intact to light touch all extremities  CHEST/LUNG: Nonlabored breaths  ABDOMEN: Soft, nontender    < from: MR Thoracic Spine No Cont (06.04.21 @ 11:23) >  Thoracic spine:  1.  No evidence of epidural collection.  2.  There are T1, T2, and STIR hyperintense lesions in the T10 vertebral body and left T3 pedicle. These are nonspecific but could represent atypical hemangiomas.  3.  Large left pleural effusion. Consider CT chest.    Lumbar spine:  1.  No evidence of epidural collection.  2.  Lumbar degenerative changes. At L4-L5, there is severe spinal canal narrowing.  3.  T2 and STIR hyperintense lesions are noted in the L3 and L5 vertebral bodies, which are nonspecific but could represent atypical hemangiomas.    < end of copied text >

## 2021-06-04 NOTE — PROGRESS NOTE ADULT - ASSESSMENT
87 y/o M with PMHx of HTN, HLD, CAD, HFpEF, s/p Right CEA for Carotid artery disease, ESRD on HD 2d/week (M/Fri) via LUE fistula, s/p flecainide w/ ILR placed 6/2020, AS s/p TAVR, and PAD (RLE fem-pop bypass October 2020) multiple gastric AVM's s/p cauterized s/p LLE fem-pop bypass, and had the bypass performed on 3/20/21, post procedure developed L femoral DVT and s/p revision of LLE bypass 3/25/21 brought to ED with cc of b/l LE ext weakness with difficulty to ambulate for past 2 days. In addition, per daughter pt has slurred speech since yesterday. In ED CT head negative however has elevated trop. Pt Denies any sob, no chest pain, no n/v or abd pain. Per pt has lower extremity weakness and cant walk. (02 Jun 2021 14:43)    #R/O CVA in setting of b/l LE weakness and slurred speech  -CT head negative for acute pathology  -MRI: Bilateral frontal subdural collection suggestive of subdural hematomas or subdural hygromas   -PT eval appreciated   -Continue Lipitor 80mg po qhs and Plavix 75mg daily   -Neurology consult appreciated    #Left Lower Extremity DVT  -B/L LE edema, L>R  -Venous duplex b/l LE: New extension of LLE thrombus into L femoral vein  -MRI head with subdural collection suggestive of subdural hematoma or hygroma  -Pending Neurosurgery clearance to initiate Heparin gtt   -MRI spine pending to r/o epidural hematoma    #Elevated Troponin levels in setting of ESRD  -Asymptomatic   -EKG: NSR, rate 82/min, first degree AV block   -Continue telemetry monitoring   -Cardiology consult appreciated     #Generalized weakness, leg weakness    -PT eval appreciated   -likely due to LLE edema that is prominent    #PVD/HLD/HTN  -continue Plavix statin, nifedipine Imdur    #ESRD on HD  -Continue HD  -Nephrology consult appreciated     #Depression  -Continue duloxetine    #Hx of GI bleed due to gastric AVMs  -Continue PPI  -h/h stable       DISPO: Pending MRI spine, to r/o epidural collection/ hematoma to initiate heparin gtt for new extension of L femoral vein thrombus     Above discussed with Dr. Vargas      85 y/o M with PMHx of HTN, HLD, CAD, HFpEF, s/p Right CEA for Carotid artery disease, ESRD on HD 2d/week (M/Fri) via LUE fistula, s/p flecainide w/ ILR placed 6/2020, AS s/p TAVR, and PAD (RLE fem-pop bypass October 2020) multiple gastric AVM's s/p cauterized s/p LLE fem-pop bypass, and had the bypass performed on 3/20/21, post procedure developed L femoral DVT and s/p revision of LLE bypass 3/25/21 brought to ED with cc of b/l LE ext weakness with difficulty to ambulate for past 2 days. In addition, per daughter pt has slurred speech since yesterday. In ED CT head negative however has elevated trop. Pt Denies any sob, no chest pain, no n/v or abd pain. Per pt has lower extremity weakness and cant walk. (02 Jun 2021 14:43)    #R/O CVA in setting of b/l LE weakness and slurred speech  -CT head negative for acute pathology  -MRI: Bilateral frontal subdural collection suggestive of subdural hematomas or subdural hygromas   -PT eval appreciated   -Continue Lipitor 80mg po qhs and Plavix 75mg daily   -Neurology consult appreciated    #Left Lower Extremity DVT  -B/L LE edema, L>R  -Venous duplex b/l LE: New extension of LLE thrombus into L femoral vein  -MRI head with subdural collection suggestive of subdural hematoma or hygroma  -Pending Neurosurgery clearance to initiate Heparin gtt   -MRI spine pending to r/o epidural hematoma  -Vascular surgery consult appreciated: recommends IVC filter     #Elevated Troponin levels in setting of ESRD  -Asymptomatic   -EKG: NSR, rate 82/min, first degree AV block   -Continue telemetry monitoring   -Cardiology consult appreciated     #Generalized weakness, leg weakness    -PT eval appreciated   -likely due to LLE edema that is prominent    #PVD/HLD/HTN  -continue Plavix statin, nifedipine Imdur    #ESRD on HD  -Continue HD  -Nephrology consult appreciated     #Depression  -Continue duloxetine    #Hx of GI bleed due to gastric AVMs  -Continue PPI  -h/h stable       DISPO: Pending neurosurgery clearance to initiate heparin gtt for new extension of L femoral vein thrombus     Above discussed with Dr. Vargas      87 y/o M with PMHx of HTN, HLD, CAD, HFpEF, s/p Right CEA for Carotid artery disease, ESRD on HD 2d/week (M/Fri) via LUE fistula, s/p flecainide w/ ILR placed 6/2020, AS s/p TAVR, and PAD (RLE fem-pop bypass October 2020) multiple gastric AVM's s/p cauterized s/p LLE fem-pop bypass, and had the bypass performed on 3/20/21, post procedure developed L femoral DVT and s/p revision of LLE bypass 3/25/21 brought to ED with cc of b/l LE ext weakness with difficulty to ambulate for past 2 days. In addition, per daughter pt has slurred speech since yesterday. In ED CT head negative however has elevated trop. Pt Denies any sob, no chest pain, no n/v or abd pain. Per pt has lower extremity weakness and cant walk. (02 Jun 2021 14:43)    #R/O CVA in setting of b/l LE weakness and slurred speech  -CT head negative for acute pathology  -MRI: Bilateral frontal subdural collection suggestive of subdural hematomas or subdural hygromas   -PT eval appreciated   -Continue Lipitor 80mg po qhs and Plavix 75mg daily   -Neurology consult appreciated    #Left Lower Extremity DVT  -B/L LE edema, L>R  -Venous duplex b/l LE: New extension of LLE thrombus into L femoral vein  -MRI head with subdural collection suggestive of subdural hematoma or hygroma  -MRI spine pending to r/o epidural hematoma - if negative will go ahead with heparin initiation  -Vascular surgery consult appreciated: recommends IVC filter     #Elevated Troponin levels in setting of ESRD  -Asymptomatic   -EKG: NSR, rate 82/min, first degree AV block   -Continue telemetry monitoring   -Cardiology consult appreciated     #Generalized weakness, leg weakness    -PT eval appreciated   -likely due to LLE edema that is prominent    #PVD/HLD/HTN  -continue Plavix statin, nifedipine Imdur    #ESRD on HD  -Continue HD  -Nephrology consult appreciated     #Depression  -Continue duloxetine    #Hx of GI bleed due to gastric AVMs  -Continue PPI  -h/h stable     DISPO: initiate heparin gtt pending MRI for new extension of L femoral vein thrombus     Above discussed with Dr. Vargas

## 2021-06-04 NOTE — PROGRESS NOTE ADULT - ASSESSMENT
87 year old Male w/ PMHx of HTN, HLD, CAD, HFpEF, s/p Right CEA for Carotid artery disease, ESRD on HD 2d/week (M/Fri) via LUE fistula, s/p flecainide w/ ILR placed 6/2020, AS s/p TAVR, and PAD (RLE fem-pop bypass October 2 020) multiple gastric AVM's s/p cauterized s/p LLE fem-pop bypass, and had the bypass performed on 3/20/21, post procedure developed L femoral DVT and s/p revision of LLE bypass 3/25/21 brought to ED with cc of b/l LE ext weakness with difficulty to ambulate for past 2 days. In addition, per daughter pt has slurred speech since yesterday. In ED CT head negative however has elevated trop. Pt Denies any sob, no chest pain, no n/v or abd pain. Per pt has lower extremity weakness and cant walk. (02 Jun 2021 14:43)    Plan   - Okay to start heparin drip   - CT head 6 hours after therapeutic on heparin drip due to SDH on MRI   - STAT CT head if decline in mental status   - -160   - Continue care per primary team

## 2021-06-04 NOTE — DISCHARGE NOTE NURSING/CASE MANAGEMENT/SOCIAL WORK - PATIENT PORTAL LINK FT
You can access the FollowMyHealth Patient Portal offered by [facility] by registering at the following website: [email or web address]. By joining [facility]’s FollowMyHealth portal, you will also be able to view your health information using other applications (apps) compatible with our system.

## 2021-06-04 NOTE — PROGRESS NOTE ADULT - ASSESSMENT
ESRD on HD- twice weekly schedule (MF outpt)- HD today  Anemia: Hb below goal,  continue AMY  leg weakness/ Slurred speech- neurology following  BP stable; continue current regimen

## 2021-06-04 NOTE — PROGRESS NOTE ADULT - SUBJECTIVE AND OBJECTIVE BOX
[facility]                                        Neurology [facility] [place]                                 [address]                                  [address]                                        [place], [place], [address]                                             [phone]        CC: Gait difficulty    HPI:   The patient is a 87y Male who presented with difficulty walking for 3 days.  He has history of multiple surgeries for PAD on his lower extremities, in March he had left fem-pop bypass complicated by DVT and went to rehab.  He was able to walk about 15-20 feet with a walker until it was too painful and he had to stop.  For the past three days he has noticed worsening b/l foot numbness with difficulty walking and dragging his feet.    Interim history:  On 4 Tower.     ROS:   Denies headache or dizziness.  Denies chest pain.  Denies shortness of breath.    MEDICATIONS  (STANDING):  atorvastatin 80 milliGRAM(s) Oral at bedtime  clopidogrel Tablet 75 milliGRAM(s) Oral daily  DULoxetine 60 milliGRAM(s) Oral daily  epoetin juan-epbx (RETACRIT) Injectable 34820 Unit(s) SubCutaneous <User Schedule>  heparin  Infusion.  Unit(s)/Hr (12 mL/Hr) IV Continuous <Continuous>  isosorbide   mononitrate ER Tablet (IMDUR) 30 milliGRAM(s) Oral daily  NIFEdipine XL 90 milliGRAM(s) Oral daily  NIFEdipine XL 60 milliGRAM(s) Oral at bedtime  pantoprazole    Tablet 40 milliGRAM(s) Oral two times a day  tamsulosin 0.4 milliGRAM(s) Oral at bedtime  torsemide 20 milliGRAM(s) Oral <User Schedule>  traMADol 25 milliGRAM(s) Oral once      Vital Signs Last 24 Hrs  T(C): 36.1 (04 Jun 2021 11:50), Max: 36.9 (04 Jun 2021 01:39)  T(F): 97 (04 Jun 2021 11:50), Max: 98.5 (04 Jun 2021 01:39)  HR: 91 (04 Jun 2021 11:50) (87 - 95)  BP: 144/73 (04 Jun 2021 11:50) (133/56 - 167/71)  BP(mean): 90 (03 Jun 2021 15:45) (90 - 90)  RR: 16 (04 Jun 2021 11:50) (16 - 18)  SpO2: 96% (04 Jun 2021 08:04) (91% - 98%)  There is some swelling in left leg distally.    Detailed Neurologic Exam:    Mental status: The patient is awake and alert. There is no aphasia. There is no dysarthria.     Cranial nerves: Pupils equal and react symmetrically to light. There is no visual field deficit to confrontation. Extraocular motion is full with no nystagmus. There is no ptosis. Facial sensation is intact. Facial musculature is symmetric. Palate elevates symmetrically.  Tongue is midline.    Motor: There is normal bulk and tone.  There is no tremor.  Strength is 5/5 in the right arm and 4+/5leg.   Strength is 5/5 in the left arm and 4-/5 leg.    Sensation: Intact to light touch and pin in 4 extremities, except for decreased at both feet    Reflexes: 1+ throughout and plantar responses are equivocal.    Cerebellar: There is no dysmetria on finger to nose testing.      Labs:     06-04    140  |  99  |  50.5<H>  ----------------------------<  85  3.9   |  24.0  |  5.66<H>    Ca    9.1      04 Jun 2021 06:27  Mg     2.0     06-03    TPro  6.2<L>  /  Alb  3.0<L>  /  TBili  0.5  /  DBili  0.2  /  AST  161<H>  /  ALT  76<H>  /  AlkPhos  102  06-04                            9.6    8.67  )-----------( 253      ( 04 Jun 2021 06:27 )             31.2       Rad:   MRI brain no acute CVA, mass or blood.  (+) b/l subdural hygromas    LE duplex showed extension of left LE DVT    Head CT no acute CVA, mass or blood

## 2021-06-04 NOTE — PROGRESS NOTE ADULT - ASSESSMENT
87y Male who is followed by neurology because of leg weakness    Leg weakness  Likely related to previous vascular issues, extension of left LE DVT.  MRI brain did not show stroke  PT/OT  Continue antiplatelet and statin.   MRI C, T- and LS spine ordered by neurosurgery.    Discussed with patient's daughter.

## 2021-06-04 NOTE — PROGRESS NOTE ADULT - ASSESSMENT
Assessment  generalized weakness  ESRD on HD  s/p TAVR mod MS/MR  nonobst CAD normal EF  elevated trop not ischemic pattern no WMA, nondiag in setting of ESRD  Severe pul HTN  micra leadless PPM  ILR  h/o PAF in SR NOT AC candidate   h/o GIBs      Rec  cont current meds  cont PT ?LYNNE  will FU intermittently

## 2021-06-04 NOTE — PROGRESS NOTE ADULT - SUBJECTIVE AND OBJECTIVE BOX
[facility] - [facility], [facility]                                   [address] [place] [address]                                                      PHONE: [phone]                                                         FAX: [phone]  [email or web address]  ---------------------------------------------------------------------------------------------------------------------------------    Overnight events/patient complaints: no complaints, generally weak, MRI brain unremarkable      Plavix (Hives)  Toprol-XL (Rash)    MEDICATIONS  (STANDING):  atorvastatin 80 milliGRAM(s) Oral at bedtime  clopidogrel Tablet 75 milliGRAM(s) Oral daily  DULoxetine 60 milliGRAM(s) Oral daily  epoetin juan-epbx (RETACRIT) Injectable 10125 Unit(s) SubCutaneous <User Schedule>  isosorbide   mononitrate ER Tablet (IMDUR) 30 milliGRAM(s) Oral daily  NIFEdipine XL 90 milliGRAM(s) Oral daily  NIFEdipine XL 60 milliGRAM(s) Oral at bedtime  pantoprazole    Tablet 40 milliGRAM(s) Oral two times a day  tamsulosin 0.4 milliGRAM(s) Oral at bedtime  torsemide 20 milliGRAM(s) Oral <User Schedule>    MEDICATIONS  (PRN):      Vital Signs Last 24 Hrs  T(C): 36.3 (2021 08:04), Max: 36.9 (2021 01:39)  T(F): 97.4 (2021 08:04), Max: 98.5 (2021 01:39)  HR: 94 (2021 08:04) (87 - 95)  BP: 159/78 (2021 08:04) (133/56 - 167/71)  BP(mean): 90 (2021 15:45) (90 - 90)  RR: 18 (2021 08:04) (16 - 18)  SpO2: 96% (2021 08:04) (91% - 98%)  Daily     Daily Weight in k.4 (2021 06:50)  ICU Vital Signs Last 24 Hrs  CHRISTIANO ELIZABETH  I&O's Detail    2021 07:01  -  2021 07:00  --------------------------------------------------------  IN:    Oral Fluid: 360 mL  Total IN: 360 mL    OUT:    Stool (mL): 3 mL    Voided (mL): 65 mL  Total OUT: 68 mL    Total NET: 292 mL        I&O's Summary    2021 07:01  -  2021 07:00  --------------------------------------------------------  IN: 360 mL / OUT: 68 mL / NET: 292 mL      Drug Dosing Weight  CHRISTIANO ELIZABETH      PHYSICAL EXAM:  General: Appears well developed, well nourished alert and cooperative.  HEENT: Head; normocephalic, atraumatic.  Eyes: Pupils reactive, cornea wnl.  Neck: Supple, no nodes adenopathy, no NVD or carotid bruit or thyromegaly.  CARDIOVASCULAR: Normal S1 and S2, No murmur, rub, gallop or lift.   LUNGS: No rales, rhonchi or wheeze. Normal breath sounds bilaterally.  ABDOMEN: Soft, nontender without mass or organomegaly. bowel sounds normoactive.  EXTREMITIES: No clubbing, cyanosis or edema. Distal pulses wnl.   SKIN: warm and dry with normal turgor.  NEURO: Alert/oriented x 3/normal motor exam. No pathologic reflexes.    PSYCH: normal affect.        LABS:                        9.6    8.67  )-----------( 253      ( 2021 06:27 )             31.2     06-04    140  |  99  |  50.5<H>  ----------------------------<  85  3.9   |  24.0  |  5.66<H>    Ca    9.1      2021 06:27  Mg     2.0     06-03    TPro  6.8  /  Alb  3.4  /  TBili  0.5  /  DBili  x   /  AST  134<H>  /  ALT  60<H>  /  AlkPhos  114  06-02    CHRISTIANO ELIZABETH  CARDIAC MARKERS ( 2021 19:11 )  x     / 0.51 ng/mL / x     / x     / x      CARDIAC MARKERS ( 2021 12:33 )  x     / 0.58 ng/mL / x     / x     / x          PT/INR - ( 2021 12:33 )   PT: 13.2 sec;   INR: 1.15 ratio         PTT - ( 2021 12:33 )  PTT:33.2 sec  Urinalysis Basic - ( 2021 07:54 )    Color: Yellow / Appearance: Clear / S.010 / pH: x  Gluc: x / Ketone: Negative  / Bili: Negative / Urobili: Negative mg/dL   Blood: x / Protein: 100 mg/dL / Nitrite: Negative   Leuk Esterase: Negative / RBC: 3-5 /HPF / WBC 3-5   Sq Epi: x / Non Sq Epi: Negative / Bacteria: Negative        RADIOLOGY & ADDITIONAL STUDIES:    INTERPRETATION OF TELEMETRY (personally reviewed):    ECG:< from: 12 Lead ECG (21 @ 12:12) >    Diagnosis Line *** Poor data quality, interpretation may be adversely affected  Sinus rhythm with 1st degree A-V block  Otherwise normal ECG    Confirmed by BRIGID VITAL ([id number]) on 6/3/2021 11:26:05 AM    < end of copied text >      ECHO:< from: TTE Echo Complete w/o Contrast w/ Doppler (21 @ 21:45) >      Summary:   1. There is moderate concentric left ventricular hypertrophy.   2. Normal global left ventricular systolic function.   3. Left ventricular ejection fraction, by visual estimation, is 60 to 65%.   4. Normal right ventricular size and function.   5. Severely enlarged left atrium.   6. Moderately enlarged right atrium.   7. Bioprosthesis in the aortic position with normal gradients.   8. Moderate mitral valve regurgitation.   9. Moderate mitral annular calcification.  10. Moderate tricuspid regurgitation.  11. Estimated pulmonary artery systolic pressure is 80.6 mmHg assuming a right atrial pressure of 8 mmHg, which is consistent with severe pulmonary hypertension.  12. Moderate mitral stenosis, increased gradient partly due to tachycardia.    MD Eleanor Electronically signed on 6/3/2021 at 12:29:09 PM            *** Final ***              < end of copied

## 2021-06-05 LAB
ANION GAP SERPL CALC-SCNC: 13 MMOL/L — SIGNIFICANT CHANGE UP (ref 5–17)
APTT BLD: 81.6 SEC — HIGH (ref 27.5–35.5)
BUN SERPL-MCNC: 24.2 MG/DL — HIGH (ref 8–20)
CALCIUM SERPL-MCNC: 9.3 MG/DL — SIGNIFICANT CHANGE UP (ref 8.6–10.2)
CHLORIDE SERPL-SCNC: 100 MMOL/L — SIGNIFICANT CHANGE UP (ref 98–107)
CO2 SERPL-SCNC: 28 MMOL/L — SIGNIFICANT CHANGE UP (ref 22–29)
COLLECT DURATION TIME UR: 24 HR — SIGNIFICANT CHANGE UP
CREAT SERPL-MCNC: 3.39 MG/DL — HIGH (ref 0.5–1.3)
GLUCOSE SERPL-MCNC: 85 MG/DL — SIGNIFICANT CHANGE UP (ref 70–99)
HCT VFR BLD CALC: 30.5 % — LOW (ref 39–50)
HGB BLD-MCNC: 9.3 G/DL — LOW (ref 13–17)
MCHC RBC-ENTMCNC: 29.1 PG — SIGNIFICANT CHANGE UP (ref 27–34)
MCHC RBC-ENTMCNC: 30.5 GM/DL — LOW (ref 32–36)
MCV RBC AUTO: 95.3 FL — SIGNIFICANT CHANGE UP (ref 80–100)
PLATELET # BLD AUTO: 252 K/UL — SIGNIFICANT CHANGE UP (ref 150–400)
POTASSIUM SERPL-MCNC: 3.9 MMOL/L — SIGNIFICANT CHANGE UP (ref 3.5–5.3)
POTASSIUM SERPL-SCNC: 3.9 MMOL/L — SIGNIFICANT CHANGE UP (ref 3.5–5.3)
RBC # BLD: 3.2 M/UL — LOW (ref 4.2–5.8)
RBC # FLD: 19.1 % — HIGH (ref 10.3–14.5)
SODIUM SERPL-SCNC: 141 MMOL/L — SIGNIFICANT CHANGE UP (ref 135–145)
TOTAL VOLUME - 24 HOUR: 200 ML — SIGNIFICANT CHANGE UP
URINE CREATININE CALCULATION: 0.1 G/24 HR — LOW (ref 1–2)
WBC # BLD: 9.06 K/UL — SIGNIFICANT CHANGE UP (ref 3.8–10.5)
WBC # FLD AUTO: 9.06 K/UL — SIGNIFICANT CHANGE UP (ref 3.8–10.5)

## 2021-06-05 PROCEDURE — 99233 SBSQ HOSP IP/OBS HIGH 50: CPT

## 2021-06-05 PROCEDURE — 70450 CT HEAD/BRAIN W/O DYE: CPT | Mod: 26

## 2021-06-05 PROCEDURE — 99232 SBSQ HOSP IP/OBS MODERATE 35: CPT

## 2021-06-05 RX ORDER — TRAMADOL HYDROCHLORIDE 50 MG/1
25 TABLET ORAL ONCE
Refills: 0 | Status: DISCONTINUED | OUTPATIENT
Start: 2021-06-05 | End: 2021-06-05

## 2021-06-05 RX ADMIN — TAMSULOSIN HYDROCHLORIDE 0.4 MILLIGRAM(S): 0.4 CAPSULE ORAL at 21:36

## 2021-06-05 RX ADMIN — TRAMADOL HYDROCHLORIDE 25 MILLIGRAM(S): 50 TABLET ORAL at 01:28

## 2021-06-05 RX ADMIN — PANTOPRAZOLE SODIUM 40 MILLIGRAM(S): 20 TABLET, DELAYED RELEASE ORAL at 17:03

## 2021-06-05 RX ADMIN — ATORVASTATIN CALCIUM 80 MILLIGRAM(S): 80 TABLET, FILM COATED ORAL at 21:36

## 2021-06-05 RX ADMIN — PANTOPRAZOLE SODIUM 40 MILLIGRAM(S): 20 TABLET, DELAYED RELEASE ORAL at 05:55

## 2021-06-05 RX ADMIN — Medication 90 MILLIGRAM(S): at 05:55

## 2021-06-05 RX ADMIN — HEPARIN SODIUM 1200 UNIT(S)/HR: 5000 INJECTION INTRAVENOUS; SUBCUTANEOUS at 02:03

## 2021-06-05 RX ADMIN — CLOPIDOGREL BISULFATE 75 MILLIGRAM(S): 75 TABLET, FILM COATED ORAL at 09:05

## 2021-06-05 RX ADMIN — TRAMADOL HYDROCHLORIDE 25 MILLIGRAM(S): 50 TABLET ORAL at 02:05

## 2021-06-05 RX ADMIN — Medication 60 MILLIGRAM(S): at 21:36

## 2021-06-05 RX ADMIN — DULOXETINE HYDROCHLORIDE 60 MILLIGRAM(S): 30 CAPSULE, DELAYED RELEASE ORAL at 09:06

## 2021-06-05 RX ADMIN — ISOSORBIDE MONONITRATE 30 MILLIGRAM(S): 60 TABLET, EXTENDED RELEASE ORAL at 09:06

## 2021-06-05 NOTE — PROGRESS NOTE ADULT - SUBJECTIVE AND OBJECTIVE BOX
cc: cva , dvt , elevated trops     interval h x; patient seen and eval. awake , states hes in [facility] , knows year , aware of himself. follows simple commands.  no fever or chills. denies any ha, n/v.     Vital Signs Last 24 Hrs  T(C): 36.8 (05 Jun 2021 09:22), Max: 37 (05 Jun 2021 00:00)  T(F): 98.2 (05 Jun 2021 09:22), Max: 98.6 (05 Jun 2021 00:00)  HR: 78 (05 Jun 2021 12:00) (71 - 88)  BP: 142/56 (05 Jun 2021 12:00) (142/56 - 177/75)  BP(mean): --  RR: 18 (05 Jun 2021 12:00) (16 - 19)  SpO2: 95% (05 Jun 2021 12:00) (95% - 100%)      GEN: NAD, comfortable  HEENT: NC/AT,  PERRLA, MMM, clear conjunctiva and sclera, normal hearing   CV: S1S2, RRR, no mumur  RESP: good air movement, CTABL, no rales, rhonchi or wheezing, respirations unlabored  ABD: +BS, soft, ND, NT, no guarding, no rigidity, no HSM  EXT: +2 radial and pedal pulses, b/l LE edema, L>R  NEURO: 5/5 strength to b/l UE, ~4/5 to LLE and RLE, alert and oriented x 2-3 , no facial asymmetry  PSYCH: normal behavior                           9.3    9.06  )-----------( 252      ( 05 Jun 2021 06:20 )             30.5   06-05    141  |  100  |  24.2<H>  ----------------------------<  85  3.9   |  28.0  |  3.39<H>    Ca    9.3      05 Jun 2021 06:20    TPro  6.2<L>  /  Alb  3.0<L>  /  TBili  0.5  /  DBili  0.2  /  AST  161<H>  /  ALT  76<H>  /  AlkPhos  102  06-04      < from: CT Head No Cont (06.05.21 @ 08:20) >  IMPRESSION:  No evidence of acute intracranial abnormality.  No evidence of hemorrhage.  Chronic changes as above.  < end of copied text >    RADIOLOGY:  < from: CT Brain Stroke Protocol (06.02.21 @ 12:59) >  FINDINGS:    There is no acute intracranial hemorrhage or mass effect. There are areas of hypodensity in the bilateral hemispheric white matter suggesting chronic white matter microvascular ischemic change. There is cerebral volume loss. A calcification in the left parietal lobe is again seen.    There is no extraaxial fluid collection.    Intracranial atherosclerotic calcifications.    There is no displaced calvarial fracture. The visualized orbits are within normal limits. The visualized portions of the paranasal sinuses are well aerated. The mastoid air cells are well aerated.      IMPRESSION:    No evidence of intracranial hemorrhage or mass effect. If clinical suspicion for acute infarct persists, brain MRI can be obtained if there is no contraindication.    < from: MR Head No Cont (06.03.21 @ 13:53) >    There is no evidence of acute infarct. Chronic right pontine lacunar infarct. A few small foci of susceptibility artifact in the bilateral cerebral hemispheres suggesting chronic microhemorrhages. Scattered foci of T2/FLAIR hyperintensity in the bilateral hemispheric white matter are nonspecific but likely related to chronic white matter microvascular ischemic disease. There is cerebral volume loss. Trace bilateral frontal subdural collections suggesting subdural hematomas or subdural hygromas.    Flow voids of the major intracranial vessels at the skull base follow expected course and contour.    The paranasal sinuses demonstrate no signal abnormality. The mastoids demonstrate no signal abnormality.  Bilateral orbits are within normal limits.      IMPRESSION:  1.  No acute infarct.  2.  Trace bilateral frontal subdural collections suggesting subdural hematomas or subdural hygromas.      < from: US Duplex Venous Lower Ext Complete, Bilateral (06.03.21 @ 14:52) >  FINDINGS:    RIGHT:  Normal compressibility of the RIGHT common femoral, femoral and popliteal veins.  Doppler examination shows normal spontaneous and phasic flow.  No RIGHT calf vein thrombosis is detected.    LEFT:  There is no evidence of thrombus within the LEFT femoral vein as well as popliteal vein. Flow is seen in the posterior tibial vein. The peroneal vein is not visualized.    IMPRESSION:  New extension of LEFT lower extremity thrombus into the LEFT femoral vein.

## 2021-06-05 NOTE — PROGRESS NOTE ADULT - SUBJECTIVE AND OBJECTIVE BOX
HPI:  87 year old Male w/ PMHx of HTN, HLD, CAD, HFpEF, s/p Right CEA for Carotid artery disease, ESRD on HD 2d/week (/Fri) via LUE fistula, s/p flecainide w/ ILR placed 2020, AS s/p TAVR, and PAD (RLE fem-pop bypass 2020) multiple gastric AVM's s/p cauterized s/p LLE fem-pop bypass, and had the bypass performed on 3/20/21, post procedure developed L femoral DVT and s/p revision of LLE bypass 3/25/21 brought to ED with cc of b/l LE ext weakness with difficulty to ambulate for past 2 days. In addition, per daughter pt has slurred speech since yesterday. In ED CT head negative however has elevated trop. Pt Denies any sob, no chest pain, no n/v or abd pain. Per pt has lower extremity weakness and cant walk. (2021 14:43)  Patient seen and examined this evening. Patient states he slipped out of his wheelchair and hit his head, -LOC. Believes he lost balance d/t Left foot ulcer. Denies back pain, numbness, tingling, HA, changes in vision, bowel/bladder incontinence, saddle anesthesia.   (2021 14:43)    INTERVAL HPI/OVERNIGHT EVENTS:  87y Male seen and examined by neurosurgical team, sitting comfortably in bed, eating meal. Patient reports he is feeling well and denies any complaints. No acute overnight events reported.     Vital Signs Last 24 Hrs  T(C): 36.8 (2021 09:22), Max: 37 (2021 00:00)  T(F): 98.2 (2021 09:22), Max: 98.6 (2021 00:00)  HR: 78 (2021 12:00) (71 - 88)  BP: 142/56 (2021 12:00) (142/56 - 177/75)  RR: 18 (2021 12:00) (16 - 19)  SpO2: 95% (2021 12:00) (95% - 100%)    PHYSICAL EXAM:  GENERAL: NAD, calm, pleasant  HEAD: Atraumatic, normocephalic  SACHA COMA SCORE: E-4 V-5 M-6 = 15  MENTAL STATUS: AAO x3; Awake; Opens eyes spontaneously; Appropriately conversant without aphasia; following commands  CRANIAL NERVES: PERRL. EOMI without nystagmus. Facial sensation intact V1-3 distribution b/l. Face symmetric w/ normal eye closure and smile, tongue midline. Hearing grossly intact. Speech clear.   MOTOR: strength 5/5 b/l UE, 4-/5 LLE, 4+ RLE  SENSATION: grossly intact to light touch all extremities  CHEST/LUNG: Nonlabored breathing, no signs of respiratory distress   ABDOMEN: Soft, nontender, nondistended  EXTREMITIES: LLE with ace bandage  SKIN: Warm, dry    LABS:                        9.3    9.06  )-----------( 252      ( 2021 06:20 )             30.5         141  |  100  |  24.2<H>  ----------------------------<  85  3.9   |  28.0  |  3.39<H>    Ca    9.3      2021 06:20    TPro  6.2<L>  /  Alb  3.0<L>  /  TBili  0.5  /  DBili  0.2  /  AST  161<H>  /  ALT  76<H>  /  AlkPhos  102  -04    PTT - ( 2021 01:44 )  PTT:81.6 sec  Urinalysis Basic - ( 2021 07:54 )    Color: Yellow / Appearance: Clear / S.010 / pH: x  Gluc: x / Ketone: Negative  / Bili: Negative / Urobili: Negative mg/dL   Blood: x / Protein: 100 mg/dL / Nitrite: Negative   Leuk Esterase: Negative / RBC: 3-5 /HPF / WBC 3-5   Sq Epi: x / Non Sq Epi: Negative / Bacteria: Negative         @ 07:01  -  - @ 07:00  --------------------------------------------------------  IN: 276 mL / OUT: 1702 mL / NET: -1426 mL        RADIOLOGY & ADDITIONAL TESTS:  CT Head No Cont (21 @ 08:20):  IMPRESSION:  No evidence of acute intracranial abnormality.  No evidence of hemorrhage.  Chronic changes as above.    MR Thoracic/Lumbar Spine No Cont (21 @ 11:23):  IMPRESSION:  Thoracic spine:  1.  No evidence of epidural collection.  2.  There are T1, T2, and STIR hyperintense lesions in the T10 vertebral body and left T3 pedicle. These are nonspecific but could represent atypical hemangiomas.  3.  Large left pleural effusion. Consider CT chest.  Lumbar spine:  1.  No evidence of epidural collection.  2.  Lumbar degenerative changes. At L4-L5, there is severe spinal canal narrowing.  3.  T2 and STIR hyperintense lesions are noted in the L3 and L5 vertebral bodies, which are nonspecific but could represent atypical hemangiomas.    MR Head No Cont (21 @ 13:53):  IMPRESSION:  1.  No acute infarct.  2.  Trace bilateral frontal subdural collections suggesting subdural hematomas or subdural hygromas.    CT Brain Stroke Protocol (21 @ 12:59):  IMPRESSION:  No evidence of intracranial hemorrhage or mass effect. If clinical suspicion for acute infarct persists, brain MRI can be obtained if there is no contraindication.

## 2021-06-05 NOTE — PROGRESS NOTE ADULT - ASSESSMENT
87 year old Male w/ PMHx of HTN, HLD, CAD, HFpEF, s/p Right CEA for Carotid artery disease, ESRD on HD 2d/week (M/Fri) via LUE fistula, s/p flecainide w/ ILR placed 6/2020, AS s/p TAVR, and PAD (RLE fem-pop bypass October 2020) multiple gastric AVM's s/p cauterized s/p LLE fem-pop bypass, and had the bypass performed on 3/20/21, post procedure developed L femoral DVT and s/p revision of LLE bypass 3/25/21 brought to ED with cc of b/l LE ext weakness with difficulty to ambulate for past 2 days. In addition, per daughter pt has slurred speech since yesterday. In ED CT head negative however has elevated trop.   Started on heparin drip yesterday. CTH 6 hours after therapeutic on heparin drip shows no evidence of hemorrhage. MRI T/L spine 6/4/21 shows no evidence of epidural collection.     Plan:  -D/w Dr. Zelaya  -Q2 hr neuro checks while on heparin gtt  -STAT CTH if decline in mental status   -No acute neurosurgical intervention indicated at this time - signing off, please reconsult as needed  -Further medical management/supportive care per primary team

## 2021-06-05 NOTE — PROGRESS NOTE ADULT - ASSESSMENT
Remains confined in Bed,    LLE Warm,    AVF +    HD On Monday ( BIW )  ml., /24 hours,    AMY SC TIW,     Plans for LYNNE Placement , STEVENW Daughter,

## 2021-06-05 NOTE — PROGRESS NOTE ADULT - SUBJECTIVE AND OBJECTIVE BOX
Chief Complaint: ESRD/Ongoing hemodialysis requirement    24 hour events/subjective: No Events,    PAST HISTORY  --------------------------------------------------------------------------------  No significant changes to PMH, PSH, FHx, SHx, unless otherwise noted    ALLERGIES & MEDICATIONS  --------------------------------------------------------------------------------  Allergies    Plavix (Hives)  Toprol-XL (Rash)    Standing Inpatient Medications  atorvastatin 80 milliGRAM(s) Oral at bedtime  clopidogrel Tablet 75 milliGRAM(s) Oral daily  DULoxetine 60 milliGRAM(s) Oral daily  epoetin juan-epbx (RETACRIT) Injectable 50662 Unit(s) SubCutaneous <User Schedule>  heparin  Infusion.  Unit(s)/Hr IV Continuous <Continuous>  isosorbide   mononitrate ER Tablet (IMDUR) 30 milliGRAM(s) Oral daily  NIFEdipine XL 90 milliGRAM(s) Oral daily  NIFEdipine XL 60 milliGRAM(s) Oral at bedtime  pantoprazole    Tablet 40 milliGRAM(s) Oral two times a day  tamsulosin 0.4 milliGRAM(s) Oral at bedtime  torsemide 20 milliGRAM(s) Oral <User Schedule>    PRN Inpatient Medications      REVIEW OF SYSTEMS  --------------------------------------------------------------------------------  Gen: No weight changes, fatigue, fevers/chills, weakness  Skin: No rashes  Head/Eyes/Ears/Mouth: No headache; Normal hearing; Normal vision w/o blurriness; No sinus pain/discomfort, sore throat  Respiratory: No dyspnea, cough, wheezing, hemoptysis  CV: No chest pain, PND, orthopnea  GI: No abdominal pain, diarrhea, constipation, nausea, vomiting, melena, hematochezia  : No increased frequency, dysuria, hematuria, nocturia  MSK: inability towalk   Neuro: slurred speech  Heme: No easy bruising or bleeding  Endo: No heat/cold intolerance  Psych: No significant nervousness, anxiety, stress, depression    All other systems were reviewed and are negative, except as noted.      VITALS/PHYSICAL EXAM  --------------------------------------------------------------------------------  T(C): 36.7 (06-04-21 @ 13:35), Max: 36.9 (06-04-21 @ 01:39)  HR: 74 (06-04-21 @ 13:35) (74 - 95)  BP: 148/69 (06-04-21 @ 13:35) (133/56 - 167/71)  RR: 18 (06-04-21 @ 13:35) (16 - 18)  SpO2: 97% (06-04-21 @ 13:35) (91% - 98%)  Wt(kg): --  Height (cm): 165.1 (06-02-21 @ 22:45)  Weight (kg): 68.2 (06-02-21 @ 22:45)  BMI (kg/m2): 25 (06-02-21 @ 22:45)  BSA (m2): 1.75 (06-02-21 @ 22:45)      06-03-21 @ 07:01  -  06-04-21 @ 07:00  --------------------------------------------------------  IN: 360 mL / OUT: 68 mL / NET: 292 mL    06-04-21 @ 07:01  -  06-04-21 @ 13:42  --------------------------------------------------------  IN: 132 mL / OUT: 1 mL / NET: 131 mL      Physical Exam:  	Gen: NAD, thin elderly man  	HEENT: PERRL  	Pulm: CTA B/L  	CV: RRR, S1S2; no rub  	Back:  no sacral edema  	Abd: +BS, soft, nontender/nondistended  	: No suprapubic tenderness  	UE: Warm, ; no edema; no asterixis  	LE: Warm; no edema  	Psych: Normal affect and mood  	Skin: Warm, without rashes  	Vascular access: AVF      LABS/STUDIES  --------------------------------------------------------------------------------              9.6    8.67  >-----------<  253      [06-04-21 @ 06:27]              31.2     140  |  99  |  50.5  ----------------------------<  85      [06-04-21 @ 06:27]  3.9   |  24.0  |  5.66        Ca     9.1     [06-04-21 @ 06:27]      Mg     2.0     [06-03-21 @ 06:07]    TPro  6.2  /  Alb  3.0  /  TBili  0.5  /  DBili  0.2  /  AST  161  /  ALT  76  /  AlkPhos  102  [06-04-21 @ 06:27]        Troponin 0.51      [06-02-21 @ 19:11]    Iron 53, TIBC 266, %sat 20      [08-19-20 @ 06:32]  Ferritin 184      [08-19-20 @ 06:32]  PTH -- (Ca 9.6)      [08-19-20 @ 16:08]   91  Vitamin D (25OH) 26.4      [08-19-20 @ 16:08]  HbA1c 4.9      [08-09-18 @ 05:54]  TSH 2.16      [09-15-20 @ 02:01]  Lipid: chol 126, , HDL 60, LDL --      [06-03-21 @ 06:07]    ESRD on HD- twice weekly schedule ( outpt)- HD today  Anemia: Hb below goal,  continue AMY  leg weakness/ Slurred speech- neurology following  BP stable; continue current regimen

## 2021-06-06 ENCOUNTER — RESULT REVIEW (OUTPATIENT)
Age: 86
End: 2021-06-06

## 2021-06-06 LAB
ALBUMIN SERPL ELPH-MCNC: 2.8 G/DL — LOW (ref 3.3–5.2)
ALP SERPL-CCNC: 101 U/L — SIGNIFICANT CHANGE UP (ref 40–120)
ALT FLD-CCNC: 116 U/L — HIGH
ANION GAP SERPL CALC-SCNC: 14 MMOL/L — SIGNIFICANT CHANGE UP (ref 5–17)
APTT BLD: 87.3 SEC — HIGH (ref 27.5–35.5)
AST SERPL-CCNC: 189 U/L — HIGH
BILIRUB SERPL-MCNC: 0.5 MG/DL — SIGNIFICANT CHANGE UP (ref 0.4–2)
BUN SERPL-MCNC: 31.5 MG/DL — HIGH (ref 8–20)
CALCIUM SERPL-MCNC: 8.9 MG/DL — SIGNIFICANT CHANGE UP (ref 8.6–10.2)
CHLORIDE SERPL-SCNC: 100 MMOL/L — SIGNIFICANT CHANGE UP (ref 98–107)
CO2 SERPL-SCNC: 25 MMOL/L — SIGNIFICANT CHANGE UP (ref 22–29)
COLLECT DURATION TIME UR: 24 HR — SIGNIFICANT CHANGE UP
CREAT SERPL-MCNC: 3.99 MG/DL — HIGH (ref 0.5–1.3)
GLUCOSE SERPL-MCNC: 99 MG/DL — SIGNIFICANT CHANGE UP (ref 70–99)
HCT VFR BLD CALC: 34 % — LOW (ref 39–50)
HGB BLD-MCNC: 10 G/DL — LOW (ref 13–17)
MCHC RBC-ENTMCNC: 29 PG — SIGNIFICANT CHANGE UP (ref 27–34)
MCHC RBC-ENTMCNC: 29.4 GM/DL — LOW (ref 32–36)
MCV RBC AUTO: 98.6 FL — SIGNIFICANT CHANGE UP (ref 80–100)
PLATELET # BLD AUTO: 258 K/UL — SIGNIFICANT CHANGE UP (ref 150–400)
POTASSIUM SERPL-MCNC: 4.2 MMOL/L — SIGNIFICANT CHANGE UP (ref 3.5–5.3)
POTASSIUM SERPL-SCNC: 4.2 MMOL/L — SIGNIFICANT CHANGE UP (ref 3.5–5.3)
PROT SERPL-MCNC: 6.2 G/DL — LOW (ref 6.6–8.7)
RBC # BLD: 3.45 M/UL — LOW (ref 4.2–5.8)
RBC # FLD: 19.5 % — HIGH (ref 10.3–14.5)
SODIUM SERPL-SCNC: 139 MMOL/L — SIGNIFICANT CHANGE UP (ref 135–145)
TOTAL VOLUME - 24 HOUR: 200 ML — SIGNIFICANT CHANGE UP
URINE CREATININE CALCULATION: 0.1 G/24 H — LOW (ref 1–2)
UUN 24H UR-MRATE: 0.4 G/24H — LOW (ref 6–24)
WBC # BLD: 9.96 K/UL — SIGNIFICANT CHANGE UP (ref 3.8–10.5)
WBC # FLD AUTO: 9.96 K/UL — SIGNIFICANT CHANGE UP (ref 3.8–10.5)

## 2021-06-06 PROCEDURE — 70450 CT HEAD/BRAIN W/O DYE: CPT | Mod: 26

## 2021-06-06 PROCEDURE — 99233 SBSQ HOSP IP/OBS HIGH 50: CPT

## 2021-06-06 PROCEDURE — 71250 CT THORAX DX C-: CPT | Mod: 26

## 2021-06-06 RX ADMIN — CLOPIDOGREL BISULFATE 75 MILLIGRAM(S): 75 TABLET, FILM COATED ORAL at 12:57

## 2021-06-06 RX ADMIN — PANTOPRAZOLE SODIUM 40 MILLIGRAM(S): 20 TABLET, DELAYED RELEASE ORAL at 05:37

## 2021-06-06 RX ADMIN — Medication 90 MILLIGRAM(S): at 05:37

## 2021-06-06 RX ADMIN — DULOXETINE HYDROCHLORIDE 60 MILLIGRAM(S): 30 CAPSULE, DELAYED RELEASE ORAL at 12:56

## 2021-06-06 RX ADMIN — ATORVASTATIN CALCIUM 80 MILLIGRAM(S): 80 TABLET, FILM COATED ORAL at 22:06

## 2021-06-06 RX ADMIN — TAMSULOSIN HYDROCHLORIDE 0.4 MILLIGRAM(S): 0.4 CAPSULE ORAL at 22:07

## 2021-06-06 RX ADMIN — Medication 60 MILLIGRAM(S): at 22:07

## 2021-06-06 RX ADMIN — HEPARIN SODIUM 1200 UNIT(S)/HR: 5000 INJECTION INTRAVENOUS; SUBCUTANEOUS at 07:34

## 2021-06-06 RX ADMIN — PANTOPRAZOLE SODIUM 40 MILLIGRAM(S): 20 TABLET, DELAYED RELEASE ORAL at 18:13

## 2021-06-06 RX ADMIN — Medication 20 MILLIGRAM(S): at 08:42

## 2021-06-06 RX ADMIN — ISOSORBIDE MONONITRATE 30 MILLIGRAM(S): 60 TABLET, EXTENDED RELEASE ORAL at 12:56

## 2021-06-06 NOTE — PROGRESS NOTE ADULT - ASSESSMENT
85 y/o M with PMHx of HTN, HLD, CAD, HFpEF, s/p Right CEA for Carotid artery disease, ESRD on HD 2d/week (M/Fri) via LUE fistula, s/p flecainide w/ ILR placed 6/2020, AS s/p TAVR, and PAD (RLE fem-pop bypass October 2020) multiple gastric AVM's s/p cauterized s/p LLE fem-pop bypass, and had the bypass performed on 3/20/21, post procedure developed L femoral DVT and s/p revision of LLE bypass 3/25/21 brought to ED with cc of b/l LE ext weakness with difficulty to ambulate for past 2 days. In addition, per daughter pt has slurred speech since yesterday. In ED CT head negative however has elevated trop. Pt Denies any sob, no chest pain, no n/v or abd pain. Per pt has lower extremity weakness and cant walk. (02 Jun 2021 14:43)    > b/l LE weakness and slurred speech/ likely generalized cause/ left leg dvt   - less likely new cva   -initial CT head negative for acute pathology  -MRI brain : Bilateral frontal subdural collection suggestive of subdural hematomas or subdural hygromas   - repeat ct head 6/5 < from: CT Head No Cont (06.05.21 @ 08:20) >he ventricles and sulci are prominent, compatible with age-related generalized cerebral volume loss.   There is no CT evidence for acute cerebral cortical infarct. There is no evidence of hemorrhage. No mass effect is found in the brain.  There is no midline shift or herniation pattern. Nonspecific chronic appearing very small coarse calcification in the left high posterior parietal cortex. The visualized portions of the paranasal sinuses and mastoid air cells are clear. No evidence of acute intracranial abnormality.  No evidence of hemorrhage. Chronic changes as above.  -Continue Lipitor 80mg po qhs and Plavix 75mg daily   - monitor q 2 neuro checks while on heparin gtt   - mri spine lumbar and thoracic spine shows  No evidence of epidural collection. There are T1, T2, and STIR hyperintense lesions in the T10 vertebral body and left T3 pedicle. These are nonspecific but could represent atypical hemangiomas. Large left pleural effusion. Consider CT chest.  - no further intervention as per neuro surgery at this time   - was noted to be more sleepy 6/6, repeat ct head doesnot show any acute changes   -Neurology / neurosurgery is following       >left large pleural effusion on mri spine / incidental finding   -ct chest   - will call ct sx if pleural effusion size significant       >Left Lower Extremity DVT  -B/L LE edema, L>R  -Venous duplex b/l LE: New extension of LLE thrombus into L femoral vein  -MRI head with subdural collection suggestive of subdural hematoma or hygroma  -MRI spine pending to r/o epidural hematoma - if negative will go ahead with heparin initiation  -Vascular surgery consult appreciated: no IVC filter recommended at this time   -c/w heparin gtt , eventual plan for changing to sq tx dose lovenox once cleared by neurosurgery/  and vascualr     >Elevated Troponin levels in setting of ESRD  -Asymptomatic   -EKG: NSR, rate 82/min, first degree AV block   -Continue telemetry monitoring   -Cardiology consult appreciated     >Generalized weakness, leg weakness    -PT eval appreciated   -likely due to LLE edema that is prominent    >PVD/HLD/HTN  -continue Plavix statin, nifedipine Imdur    >ESRD on HD  -Continue HD  -Nephrology consult appreciated     >Depression  -Continue duloxetine    >Hx of GI bleed due to gastric AVMs, follows with dr. barrera as op   - monitor h/h   -Continue PPI    >dispo: remains acute , monitor neurochecks , plan for switching to sq lovenox once cleared by  neurosurgery , gi and vascular. patient has not been able to tolerate eliquis or brillanta in past. plan of care discussed with patient and daughter Vanessa on phone.

## 2021-06-06 NOTE — PROGRESS NOTE ADULT - SUBJECTIVE AND OBJECTIVE BOX
[facility]                                        Neu[facility] [place]                                 Ligia Sorenson, & [address]                                  [address]                                        [place], [place], [address]                                             [phone]        CC: Gait difficulty    HPI:   The patient is a 87y Male who presented with difficulty walking for 3 days.  He has history of multiple surgeries for PAD on his lower extremities, in March he had left fem-pop bypass complicated by DVT and went to rehab.  He was able to walk about 15-20 feet with a walker until it was too painful and he had to stop.  For the past three days he has noticed worsening b/l foot numbness with difficulty walking and dragging his feet.    Interim history:  Remains on 4 [facility].     ROS:   Denies headache or dizziness.  Denies chest pain.  Denies shortness of breath.    MEDICATIONS  (STANDING):  atorvastatin 80 milliGRAM(s) Oral at bedtime  clopidogrel Tablet 75 milliGRAM(s) Oral daily  DULoxetine 60 milliGRAM(s) Oral daily  epoetin juan-epbx (RETACRIT) Injectable 24694 Unit(s) SubCutaneous <User Schedule>  heparin  Infusion.  Unit(s)/Hr (12 mL/Hr) IV Continuous <Continuous>  isosorbide   mononitrate ER Tablet (IMDUR) 30 milliGRAM(s) Oral daily  NIFEdipine XL 90 milliGRAM(s) Oral daily  NIFEdipine XL 60 milliGRAM(s) Oral at bedtime  pantoprazole    Tablet 40 milliGRAM(s) Oral two times a day  tamsulosin 0.4 milliGRAM(s) Oral at bedtime  torsemide 20 milliGRAM(s) Oral <User Schedule>      Vital Signs Last 24 Hrs  T(C): 36.7 (06 Jun 2021 10:00), Max: 37.1 (05 Jun 2021 20:00)  T(F): 98 (06 Jun 2021 10:00), Max: 98.7 (05 Jun 2021 20:00)  HR: 71 (06 Jun 2021 10:00) (70 - 82)  BP: 137/51 (06 Jun 2021 10:00) (137/51 - 180/69)  BP(mean): 73 (06 Jun 2021 10:00) (73 - 98)  RR: 19 (06 Jun 2021 10:00) (16 - 19)  SpO2: 96% (06 Jun 2021 10:00) (93% - 96%)    Detailed Neurologic Exam:    Mental status: The patient is sleepy but awakens readily to voice. Answers appropriately and follows instructions.    Cranial nerves: Pupils equal and react symmetrically to light. There is no visual field deficit to confrontation. Extraocular motion is full with no nystagmus. There is no ptosis. Facial sensation is intact. Facial musculature is symmetric. Palate elevates symmetrically.  Tongue is midline.    Motor: There is normal bulk and tone.  There is no tremor.  Strength is 5/5 in the right arm and 4+/5leg.   Strength is 5/5 in the left arm and 4-/5 leg.    Sensation: Intact to light touch and pin in 4 extremities, except for decreased at both feet    Reflexes: 1+ throughout and plantar responses are equivocal.    Cerebellar: There is no dysmetria on finger to nose testing.    Labs:     06-06    139  |  100  |  31.5<H>  ----------------------------<  99  4.2   |  25.0  |  3.99<H>    Ca    8.9      06 Jun 2021 06:06    TPro  6.2<L>  /  Alb  2.8<L>  /  TBili  0.5  /  DBili  x   /  AST  189<H>  /  ALT  116<H>  /  AlkPhos  101  06-06                            10.0   9.96  )-----------( 258      ( 06 Jun 2021 06:09 )             34.0         Rad:  MRI thoracic and lumbar spine reviewed. There is multilevel degenerative change and disc bulging.   There are hemangiomas at multiple vertebral levels.   There is no evidence of epidural collection and no cord signal abnormality.

## 2021-06-06 NOTE — PROGRESS NOTE ADULT - SUBJECTIVE AND OBJECTIVE BOX
Patient seen and examined    I&O's Summary    05 Jun 2021 07:01  -  06 Jun 2021 07:00  --------------------------------------------------------  IN: 240 mL / OUT: 100 mL / NET: 140 mL    06 Jun 2021 07:01  -  07 Jun 2021 06:35  --------------------------------------------------------  IN: 326 mL / OUT: 351 mL / NET: -25 mL    REVIEW OF SYSTEMS:    CONSTITUTIONAL: No F/C  RESPIRATORY: No cough or SOB  CARDIOVASCULAR: No CP/palpitations,    GASTROINTESTINAL: No abdominal pain , NVD   GENITOURINARY: No UTI sx  NEUROLOGICAL: No headaches/wk/numbness  MUSCULOSKELETAL:  No joint pain/swelling; No LBP  EXTREMITIES : no swelling,    Vital Signs Last 24 Hrs  T(C): 36.6 (07 Jun 2021 05:15), Max: 36.9 (06 Jun 2021 08:00)  T(F): 97.9 (07 Jun 2021 05:15), Max: 98.5 (06 Jun 2021 08:00)  HR: 69 (07 Jun 2021 05:15) (69 - 77)  BP: 148/58 (07 Jun 2021 05:15) (137/51 - 167/59)  BP(mean): 82 (07 Jun 2021 05:15) (73 - 104)  RR: 18 (07 Jun 2021 05:15) (18 - 20)  SpO2: 98% (07 Jun 2021 05:15) (92% - 99%)    PHYSICAL EXAM:    GENERAL: NAD,   EYES:  conjunctiva and sclera clear  NECK: Supple, No JVD/Bruit  NERVOUS SYSTEM:  A/O x3,   CHEST:  CTA ,No rales or rhonchi  HEART:  RRR, No murmurs  ABDOMEN: Soft, NT/ND BS+  EXTREMITIES:  No Edema;  SKIN: No rashes    LABS:                        9.9    8.80  )-----------( 252      ( 07 Jun 2021 05:28 )             32.8     06-07    138  |  98  |  37.5<H>  ----------------------------<  102<H>  4.3   |  25.0  |  4.65<H>    Ca    9.1      07 Jun 2021 05:28    TPro  6.2<L>  /  Alb  2.8<L>  /  TBili  0.5  /  DBili  x   /  AST  189<H>  /  ALT  116<H>  /  AlkPhos  101  06-06      MEDICATIONS  (STANDING):  atorvastatin  clopidogrel Tablet  DULoxetine  epoetin juan-epbx (RETACRIT) Injectable  heparin  Infusion.  isosorbide   mononitrate ER Tablet (IMDUR)  NIFEdipine XL  NIFEdipine XL  pantoprazole    Tablet  tamsulosin  torsemide

## 2021-06-06 NOTE — PROGRESS NOTE ADULT - SUBJECTIVE AND OBJECTIVE BOX
cc: cva , dvt , elevated trops     interval h x; patient seen and eval. awake , states hes in [facility] , knows year , aware of himself. follows simple commands.  no fever or chills. denies any ha, n/v.     Vital Signs Last 24 Hrs  T(C): 36.8 (05 Jun 2021 09:22), Max: 37 (05 Jun 2021 00:00)  T(F): 98.2 (05 Jun 2021 09:22), Max: 98.6 (05 Jun 2021 00:00)  HR: 78 (05 Jun 2021 12:00) (71 - 88)  BP: 142/56 (05 Jun 2021 12:00) (142/56 - 177/75)  BP(mean): --  RR: 18 (05 Jun 2021 12:00) (16 - 19)  SpO2: 95% (05 Jun 2021 12:00) (95% - 100%)      GEN: NAD, comfortable  HEENT: NC/AT,  PERRLA, MMM, clear conjunctiva and sclera, normal hearing   CV: S1S2, RRR, no mumur  RESP: good air movement, CTABL, no rales, rhonchi or wheezing, respirations unlabored  ABD: +BS, soft, ND, NT, no guarding, no rigidity, no HSM  EXT: +2 radial and pedal pulses, b/l LE edema, L>R  NEURO: 5/5 strength to b/l UE, ~4/5 to LLE and RLE, alert and oriented x 2-3 , no facial asymmetry  PSYCH: normal behavior                           9.3    9.06  )-----------( 252      ( 05 Jun 2021 06:20 )             30.5   06-05    141  |  100  |  24.2<H>  ----------------------------<  85  3.9   |  28.0  |  3.39<H>    Ca    9.3      05 Jun 2021 06:20    TPro  6.2<L>  /  Alb  3.0<L>  /  TBili  0.5  /  DBili  0.2  /  AST  161<H>  /  ALT  76<H>  /  AlkPhos  102  06-04      < from: CT Head No Cont (06.05.21 @ 08:20) >  IMPRESSION:  No evidence of acute intracranial abnormality.  No evidence of hemorrhage.  Chronic changes as above.  < end of copied text >    RADIOLOGY:  < from: CT Brain Stroke Protocol (06.02.21 @ 12:59) >  FINDINGS:    There is no acute intracranial hemorrhage or mass effect. There are areas of hypodensity in the bilateral hemispheric white matter suggesting chronic white matter microvascular ischemic change. There is cerebral volume loss. A calcification in the left parietal lobe is again seen.    There is no extraaxial fluid collection.    Intracranial atherosclerotic calcifications.    There is no displaced calvarial fracture. The visualized orbits are within normal limits. The visualized portions of the paranasal sinuses are well aerated. The mastoid air cells are well aerated.      IMPRESSION:    No evidence of intracranial hemorrhage or mass effect. If clinical suspicion for acute infarct persists, brain MRI can be obtained if there is no contraindication.    < from: MR Head No Cont (06.03.21 @ 13:53) >    There is no evidence of acute infarct. Chronic right pontine lacunar infarct. A few small foci of susceptibility artifact in the bilateral cerebral hemispheres suggesting chronic microhemorrhages. Scattered foci of T2/FLAIR hyperintensity in the bilateral hemispheric white matter are nonspecific but likely related to chronic white matter microvascular ischemic disease. There is cerebral volume loss. Trace bilateral frontal subdural collections suggesting subdural hematomas or subdural hygromas.    Flow voids of the major intracranial vessels at the skull base follow expected course and contour.    The paranasal sinuses demonstrate no signal abnormality. The mastoids demonstrate no signal abnormality.  Bilateral orbits are within normal limits.      IMPRESSION:  1.  No acute infarct.  2.  Trace bilateral frontal subdural collections suggesting subdural hematomas or subdural hygromas.      < from: US Duplex Venous Lower Ext Complete, Bilateral (06.03.21 @ 14:52) >  FINDINGS:    RIGHT:  Normal compressibility of the RIGHT common femoral, femoral and popliteal veins.  Doppler examination shows normal spontaneous and phasic flow.  No RIGHT calf vein thrombosis is detected.    LEFT:  There is no evidence of thrombus within the LEFT femoral vein as well as popliteal vein. Flow is seen in the posterior tibial vein. The peroneal vein is not visualized.    IMPRESSION:  New extension of LEFT lower extremity thrombus into the LEFT femoral vein.          < from: MR Lumbar Spine No Cont (06.04.21 @ 11:23) >  Thoracic spine:  1.  No evidence of epidural collection.  2.  There are T1, T2, and STIR hyperintense lesions in the T10 vertebral body and left T3 pedicle. These are nonspecific but could represent atypical hemangiomas.  3.  Large left pleural effusion. Consider CT chest.    Lumbar spine:  1.  No evidence of epidural collection.  2.  Lumbar degenerative changes. At L4-L5, there is severe spinal canal narrowing.  3.  T2 and STIR hyperintense lesions are noted in the L3 and L5 vertebral bodies, which are nonspecific but could represent atypical hemangiomas.      < end of copied text >

## 2021-06-06 NOTE — PROGRESS NOTE ADULT - ASSESSMENT
87y Male who is followed by neurology because of leg weakness    Leg weakness  Likely related to previous vascular issues, extension of left LE DVT.  MRI brain did not show stroke  PT/OT  Continue antiplatelet and statin.   MRI C, T- and LS spine ordered by neurosurgery.    DVT  On heparin.     Discussed with Dr Riggins.

## 2021-06-07 DIAGNOSIS — J90 PLEURAL EFFUSION, NOT ELSEWHERE CLASSIFIED: ICD-10-CM

## 2021-06-07 LAB
ANION GAP SERPL CALC-SCNC: 15 MMOL/L — SIGNIFICANT CHANGE UP (ref 5–17)
APTT BLD: 96.7 SEC — HIGH (ref 27.5–35.5)
B PERT IGG+IGM PNL SER: CLEAR — SIGNIFICANT CHANGE UP
BUN SERPL-MCNC: 37.5 MG/DL — HIGH (ref 8–20)
CALCIUM SERPL-MCNC: 9.1 MG/DL — SIGNIFICANT CHANGE UP (ref 8.6–10.2)
CHLORIDE SERPL-SCNC: 98 MMOL/L — SIGNIFICANT CHANGE UP (ref 98–107)
CO2 SERPL-SCNC: 25 MMOL/L — SIGNIFICANT CHANGE UP (ref 22–29)
COLOR FLD: YELLOW
CREAT SERPL-MCNC: 4.65 MG/DL — HIGH (ref 0.5–1.3)
EOSINOPHIL # FLD: 4 % — SIGNIFICANT CHANGE UP
FLUID INTAKE SUBSTANCE CLASS: SIGNIFICANT CHANGE UP
FLUID SEGMENTED GRANULOCYTES: 19 % — SIGNIFICANT CHANGE UP
GLUCOSE SERPL-MCNC: 102 MG/DL — HIGH (ref 70–99)
HCT VFR BLD CALC: 32.8 % — LOW (ref 39–50)
HGB BLD-MCNC: 9.9 G/DL — LOW (ref 13–17)
LYMPHOCYTES # FLD: 57 % — SIGNIFICANT CHANGE UP
MCHC RBC-ENTMCNC: 29.4 PG — SIGNIFICANT CHANGE UP (ref 27–34)
MCHC RBC-ENTMCNC: 30.2 GM/DL — LOW (ref 32–36)
MCV RBC AUTO: 97.3 FL — SIGNIFICANT CHANGE UP (ref 80–100)
MONOS+MACROS # FLD: 20 % — SIGNIFICANT CHANGE UP
PH FLD: 8.5 — SIGNIFICANT CHANGE UP
PLATELET # BLD AUTO: 252 K/UL — SIGNIFICANT CHANGE UP (ref 150–400)
POTASSIUM SERPL-MCNC: 4.3 MMOL/L — SIGNIFICANT CHANGE UP (ref 3.5–5.3)
POTASSIUM SERPL-SCNC: 4.3 MMOL/L — SIGNIFICANT CHANGE UP (ref 3.5–5.3)
RBC # BLD: 3.37 M/UL — LOW (ref 4.2–5.8)
RBC # FLD: 19.6 % — HIGH (ref 10.3–14.5)
RCV VOL RI: 113 /UL — HIGH (ref 0–0)
SODIUM SERPL-SCNC: 138 MMOL/L — SIGNIFICANT CHANGE UP (ref 135–145)
TOTAL NUCLEATED CELL COUNT, BODY FLUID: 143 /UL — SIGNIFICANT CHANGE UP
TUBE TYPE: SIGNIFICANT CHANGE UP
WBC # BLD: 8.8 K/UL — SIGNIFICANT CHANGE UP (ref 3.8–10.5)
WBC # FLD AUTO: 8.8 K/UL — SIGNIFICANT CHANGE UP (ref 3.8–10.5)

## 2021-06-07 PROCEDURE — 99233 SBSQ HOSP IP/OBS HIGH 50: CPT

## 2021-06-07 PROCEDURE — 88112 CYTOPATH CELL ENHANCE TECH: CPT | Mod: 26

## 2021-06-07 PROCEDURE — 71045 X-RAY EXAM CHEST 1 VIEW: CPT | Mod: 26

## 2021-06-07 PROCEDURE — 90937 HEMODIALYSIS REPEATED EVAL: CPT

## 2021-06-07 PROCEDURE — 99232 SBSQ HOSP IP/OBS MODERATE 35: CPT

## 2021-06-07 PROCEDURE — 32557 INSERT CATH PLEURA W/ IMAGE: CPT

## 2021-06-07 PROCEDURE — 88305 TISSUE EXAM BY PATHOLOGIST: CPT | Mod: 26

## 2021-06-07 RX ORDER — ERYTHROPOIETIN 10000 [IU]/ML
10000 INJECTION, SOLUTION INTRAVENOUS; SUBCUTANEOUS
Refills: 0 | Status: DISCONTINUED | OUTPATIENT
Start: 2021-06-07 | End: 2021-06-21

## 2021-06-07 RX ORDER — ACETAMINOPHEN 500 MG
650 TABLET ORAL ONCE
Refills: 0 | Status: COMPLETED | OUTPATIENT
Start: 2021-06-07 | End: 2021-06-07

## 2021-06-07 RX ADMIN — CLOPIDOGREL BISULFATE 75 MILLIGRAM(S): 75 TABLET, FILM COATED ORAL at 12:38

## 2021-06-07 RX ADMIN — Medication 650 MILLIGRAM(S): at 17:58

## 2021-06-07 RX ADMIN — Medication 650 MILLIGRAM(S): at 19:00

## 2021-06-07 RX ADMIN — PANTOPRAZOLE SODIUM 40 MILLIGRAM(S): 20 TABLET, DELAYED RELEASE ORAL at 17:32

## 2021-06-07 RX ADMIN — ERYTHROPOIETIN 10000 UNIT(S): 10000 INJECTION, SOLUTION INTRAVENOUS; SUBCUTANEOUS at 12:15

## 2021-06-07 RX ADMIN — PANTOPRAZOLE SODIUM 40 MILLIGRAM(S): 20 TABLET, DELAYED RELEASE ORAL at 05:20

## 2021-06-07 RX ADMIN — Medication 60 MILLIGRAM(S): at 21:06

## 2021-06-07 RX ADMIN — HEPARIN SODIUM 1200 UNIT(S)/HR: 5000 INJECTION INTRAVENOUS; SUBCUTANEOUS at 06:22

## 2021-06-07 RX ADMIN — ISOSORBIDE MONONITRATE 30 MILLIGRAM(S): 60 TABLET, EXTENDED RELEASE ORAL at 12:39

## 2021-06-07 RX ADMIN — Medication 20 MILLIGRAM(S): at 09:19

## 2021-06-07 RX ADMIN — ERYTHROPOIETIN 10000 UNIT(S): 10000 INJECTION, SOLUTION INTRAVENOUS; SUBCUTANEOUS at 12:34

## 2021-06-07 RX ADMIN — DULOXETINE HYDROCHLORIDE 60 MILLIGRAM(S): 30 CAPSULE, DELAYED RELEASE ORAL at 12:38

## 2021-06-07 RX ADMIN — Medication 90 MILLIGRAM(S): at 05:19

## 2021-06-07 RX ADMIN — ATORVASTATIN CALCIUM 80 MILLIGRAM(S): 80 TABLET, FILM COATED ORAL at 21:06

## 2021-06-07 RX ADMIN — TAMSULOSIN HYDROCHLORIDE 0.4 MILLIGRAM(S): 0.4 CAPSULE ORAL at 21:05

## 2021-06-07 NOTE — CONSULT NOTE ADULT - PROBLEM SELECTOR RECOMMENDATION 9
POCUS performed at bedside, pt with large left pleural effusion.  NAD  heparin gtt will need to be held 2-3 hour prior to procedure and can be restarted after CT placement  called and left message for Dr. Riggins, procedure carries increased risk of bleeding due to full AC, however if primary team feels benefits will outweigh risks, will proceed with pigtail.  D/w Dr Giron in agreement with plan.     Addendum, Dr. Riggins called to d/w thoracic surgery, in agreement with stopping heparin gtt for procedure.  Will obtain informed consent.  Pt currently undergoing HD, procedure will be after HD is finished.

## 2021-06-07 NOTE — PROCEDURE NOTE - NSPROCDETAILS_GEN_ALL_CORE
Seldinger technique/dressing applied/sterile dressing applied/supine position/percutaneous/ultrasound assessment of fluid (location)

## 2021-06-07 NOTE — DIETITIAN INITIAL EVALUATION ADULT. - ORAL INTAKE PTA/DIET HISTORY
Pt reported good po intake PTA. Pt previously met criteria for malnutrition. Past admission wt 159 lbs (April 2021) indicating possible wt loss.

## 2021-06-07 NOTE — PROGRESS NOTE ADULT - ASSESSMENT
87 y/o M with PMHx of HTN, HLD, CAD, HFpEF, s/p Right CEA for Carotid artery disease, ESRD on HD 2d/week (M/Fri) via LUE fistula, s/p flecainide w/ ILR placed 6/2020, AS s/p TAVR, and PAD (RLE fem-pop bypass October 2020) multiple gastric AVM's s/p cauterized s/p LLE fem-pop bypass, and had the bypass performed on 3/20/21, post procedure developed L femoral DVT and s/p revision of LLE bypass 3/25/21 brought to ED with cc of b/l LE ext weakness with difficulty to ambulate for past 2 days. In addition, per daughter pt has slurred speech since yesterday. In ED CT head negative however has elevated trop. Pt Denies any sob, no chest pain, no n/v or abd pain. Per pt has lower extremity weakness and cant walk. (02 Jun 2021 14:43)    # B/l LE weakness  -Acute CVA rule out  -Symptoms likely secondary to progressing/extensive DVT (Duplex: New extension of LEFT lower extremity thrombus into the LEFT femoral vein)  -CT head negative for acute pathology  -MRI brain: Trace bilateral frontal subdural collections suggesting subdural hematomas or subdural hygromas.  -Seen in consult by neuro and neurosurgical teams  -MRI C/T/L spine ordered by neurosurgery, no acute actionable findings   -Continue Lipitor 80mg po qhs and Plavix 75mg daily   -Will continue heparin drip for now incase further inpt procedures planned, eventual changed to renal dosed lovenox     #Left large pleural effusion  -Incidental finding on MRI spine imaging. Pt is currently asymptomatic, not short of breath and stable on room air   -CT Chest ordered for further eval: Moderate left pleural effusion with complete collapse left lower lobe and partial collapse lingula.  -Unclear etiology, possibly secondary to fluid shifts/oncotic pressures  -CTsx consulted, plan for thoracentesis today after 1:30. Heparin drip held as of 10:30am  -Follow pleural fluid analysis     #Left Lower Extremity DVT  -B/L LE edema, L>R  -Venous duplex b/l LE: New extension of LLE thrombus into L femoral vein  -MRI head with subdural collection suggestive of subdural hematoma or hygroma  -MRI spine with no acute actionable findings  -Vascular surgery consult appreciated: no IVC filter recommended at this time   -C/w heparin gtt , eventual plan for changing to sq therapeutic lovenox (renally dosed)    #Elevated Troponin levels in setting of ESRD  -Asymptomatic   -EKG: NSR, rate 82/min, first degree AV block   -TTE w/o WMA  -Continue telemetry monitoring   -Cardiology consult appreciated, no further recs/work up     #Generalized weakness, leg weakness     -Likely due to LLE edema that is prominent  -PT eval appreciated: home w/ assist; home w/ home PT vs LYNNE  -PTs family pushing for acute rehab     #PVD/HLD/HTN  -Continue Plavix statin, nifedipine Imdur    #ESRD on HD  -Continue HD  -Nephrology consult appreciated     #Depression  -Continue duloxetine    #Hx of GI bleed due to gastric AVMs, follows with dr. barrera as op   -Monitor h/h   -Continue PPI    DISPO: clinically still acute, pending thoracentesis w/ CTsx, eventual transition from heparin to lovenox. Plan for [facility] on DC however family would prefer [facility].

## 2021-06-07 NOTE — PROGRESS NOTE ADULT - SUBJECTIVE AND OBJECTIVE BOX
[facility]                                   [address] [place] [address]                                                      PHONE: [phone]                                                         FAX: [phone]  [email or web address]  ---------------------------------------------------------------------------------------------------------------------------------    Overnight events/patient complaints: tele NSR, on IV heparin for DVT      Plavix (Hives)  Toprol-XL (Rash)    MEDICATIONS  (STANDING):  atorvastatin 80 milliGRAM(s) Oral at bedtime  clopidogrel Tablet 75 milliGRAM(s) Oral daily  DULoxetine 60 milliGRAM(s) Oral daily  epoetin juan-epbx (RETACRIT) Injectable 81686 Unit(s) SubCutaneous <User Schedule>  heparin  Infusion.  Unit(s)/Hr (12 mL/Hr) IV Continuous <Continuous>  isosorbide   mononitrate ER Tablet (IMDUR) 30 milliGRAM(s) Oral daily  NIFEdipine XL 90 milliGRAM(s) Oral daily  NIFEdipine XL 60 milliGRAM(s) Oral at bedtime  pantoprazole    Tablet 40 milliGRAM(s) Oral two times a day  tamsulosin 0.4 milliGRAM(s) Oral at bedtime  torsemide 20 milliGRAM(s) Oral <User Schedule>    MEDICATIONS  (PRN):      Vital Signs Last 24 Hrs  T(C): 36.6 (2021 08:00), Max: 36.7 (2021 10:00)  T(F): 97.9 (2021 08:00), Max: 98.1 (2021 20:00)  HR: 76 (2021 08:00) (69 - 77)  BP: 144/58 (2021 08:00) (137/51 - 167/59)  BP(mean): 78 (2021 08:00) (73 - 104)  RR: 18 (2021 08:00) (18 - 20)  SpO2: 92% (2021 08:00) (92% - 99%)  Daily     Daily Weight in k (2021 04:30)  ICU Vital Signs Last 24 Hrs  CHRISTIANO ELIZABETH  I&O's Detail    2021 07:01  -  2021 07:00  --------------------------------------------------------  IN:    Heparin Infusion: 276 mL    Oral Fluid: 50 mL  Total IN: 326 mL    OUT:    Stool (mL): 1 mL    Voided (mL): 350 mL  Total OUT: 351 mL    Total NET: -25 mL        I&O's Summary    2021 07:01  -  2021 07:00  --------------------------------------------------------  IN: 326 mL / OUT: 351 mL / NET: -25 mL      Drug Dosing Weight  CHRISTIANO ELIZABETH      PHYSICAL EXAM:  General: Appears well developed, well nourished alert and cooperative.  HEENT: Head; normocephalic, atraumatic.  Eyes: Pupils reactive, cornea wnl.  Neck: Supple, no nodes adenopathy, no NVD or carotid bruit or thyromegaly.  CARDIOVASCULAR: Normal S1 and S2, No murmur, rub, gallop or lift.   LUNGS: No rales, rhonchi or wheeze. Normal breath sounds bilaterally.  ABDOMEN: Soft, nontender without mass or organomegaly. bowel sounds normoactive.  EXTREMITIES: No clubbing, cyanosis or edema. Distal pulses wnl.   SKIN: warm and dry with normal turgor.  NEURO: Alert/oriented x 3/normal motor exam. No pathologic reflexes.    PSYCH: normal affect.        LABS:                        9.9    8.80  )-----------( 252      ( 2021 05:28 )             32.8     06-    138  |  98  |  37.5<H>  ----------------------------<  102<H>  4.3   |  25.0  |  4.65<H>    Ca    9.1      2021 05:28    TPro  6.2<L>  /  Alb  2.8<L>  /  TBili  0.5  /  DBili  x   /  AST  189<H>  /  ALT  116<H>  /  AlkPhos  101  06-06    CHRISTIANO ELIZABETH      PTT - ( 2021 05:28 )  PTT:96.7 sec          STRESS TEST:    CARDIAC CATHETERIZATION:    ASSESSMENT AND PLAN:  In summary, CHRISTIANO ELIZABETH is an 87y Male with past medical history significant for

## 2021-06-07 NOTE — PROGRESS NOTE ADULT - SUBJECTIVE AND OBJECTIVE BOX
cc: cva , dvt , elevated trops     Pt seen and examined at bedside  Pt laying in bed, receiving HD  No complaints currently   Denies headache, dizziness, chest pain, SOB, n/v, fever, chills, abdominal pain  Remainder ROS Neg  VSS on RA    Vital Signs Last 24 Hrs  T(C): 36.7 (07 Jun 2021 10:00), Max: 36.7 (06 Jun 2021 20:00)  T(F): 98 (07 Jun 2021 10:00), Max: 98.1 (06 Jun 2021 20:00)  HR: 76 (07 Jun 2021 10:00) (69 - 77)  BP: 155/84 (07 Jun 2021 10:00) (144/58 - 167/59)  BP(mean): 78 (07 Jun 2021 08:00) (78 - 104)  RR: 18 (07 Jun 2021 10:00) (18 - 20)  SpO2: 95% (07 Jun 2021 10:00) (92% - 99%) on RA    PHYSICAL EXAM:  GEN: NAD, comfortable  HEENT: NC/AT,  PERRLA, MMM, clear conjunctiva and sclera, normal hearing   CV: S1S2, RRR, no mumur  RESP: decreased breath sounds to LLL base   ABD: +BS, soft, ND, NT, no guarding, no rigidity, no HSM  EXT: +2 radial and pedal pulses, b/l LE edema, L>R  NEURO: no neuro deficits, moves all extremities, follows all commands  PSYCH: normal behavior     LABS:                     9.9    8.80  )-----------( 252      ( 07 Jun 2021 05:28 )             32.8   06-07    138  |  98  |  37.5<H>  ----------------------------<  102<H>  4.3   |  25.0  |  4.65<H>    Ca    9.1      07 Jun 2021 05:28    TPro  6.2<L>  /  Alb  2.8<L>  /  TBili  0.5  /  DBili  x   /  AST  189<H>  /  ALT  116<H>  /  AlkPhos  101  06-06    IMAGING:     < from: CT Head No Cont (06.05.21 @ 08:20) >  IMPRESSION:  No evidence of acute intracranial abnormality.  No evidence of hemorrhage.  Chronic changes as above.  < end of copied text >    RADIOLOGY:  < from: CT Brain Stroke Protocol (06.02.21 @ 12:59) >  FINDINGS:    There is no acute intracranial hemorrhage or mass effect. There are areas of hypodensity in the bilateral hemispheric white matter suggesting chronic white matter microvascular ischemic change. There is cerebral volume loss. A calcification in the left parietal lobe is again seen.    There is no extraaxial fluid collection.    Intracranial atherosclerotic calcifications.    There is no displaced calvarial fracture. The visualized orbits are within normal limits. The visualized portions of the paranasal sinuses are well aerated. The mastoid air cells are well aerated.      IMPRESSION:    No evidence of intracranial hemorrhage or mass effect. If clinical suspicion for acute infarct persists, brain MRI can be obtained if there is no contraindication.    < from: MR Head No Cont (06.03.21 @ 13:53) >    There is no evidence of acute infarct. Chronic right pontine lacunar infarct. A few small foci of susceptibility artifact in the bilateral cerebral hemispheres suggesting chronic microhemorrhages. Scattered foci of T2/FLAIR hyperintensity in the bilateral hemispheric white matter are nonspecific but likely related to chronic white matter microvascular ischemic disease. There is cerebral volume loss. Trace bilateral frontal subdural collections suggesting subdural hematomas or subdural hygromas.    Flow voids of the major intracranial vessels at the skull base follow expected course and contour.    The paranasal sinuses demonstrate no signal abnormality. The mastoids demonstrate no signal abnormality.  Bilateral orbits are within normal limits.      IMPRESSION:  1.  No acute infarct.  2.  Trace bilateral frontal subdural collections suggesting subdural hematomas or subdural hygromas.    < from: US Duplex Venous Lower Ext Complete, Bilateral (06.03.21 @ 14:52) >  FINDINGS:    RIGHT:  Normal compressibility of the RIGHT common femoral, femoral and popliteal veins.  Doppler examination shows normal spontaneous and phasic flow.  No RIGHT calf vein thrombosis is detected.    LEFT:  There is no evidence of thrombus within the LEFT femoral vein as well as popliteal vein. Flow is seen in the posterior tibial vein. The peroneal vein is not visualized.    IMPRESSION:  New extension of LEFT lower extremity thrombus into the LEFT femoral vein.    < from: MR Lumbar Spine No Cont (06.04.21 @ 11:23) >  Thoracic spine:  1.  No evidence of epidural collection.  2.  There are T1, T2, and STIR hyperintense lesions in the T10 vertebral body and left T3 pedicle. These are nonspecific but could represent atypical hemangiomas.  3.  Large left pleural effusion. Consider CT chest.    Lumbar spine:  1.  No evidence of epidural collection.  2.  Lumbar degenerative changes. At L4-L5, there is severe spinal canal narrowing.  3.  T2 and STIR hyperin

## 2021-06-07 NOTE — CONSULT NOTE ADULT - SUBJECTIVE AND OBJECTIVE BOX
Surgeon: MD Mack    Consult requesting by: MD Gilson    HISTORY OF PRESENT ILLNESS:  87y Male multiple medical conditions presents with LE weakness elevated troponin.  F/w LLE DVT after vascular bypass procedure, started on IV heparin gtt.  Patient had MRI spine f/w incidental Left pleural effusion confimed on CTA.  Thoracic surgery called for further management.  patient denies CP SOB, currently sitting up in bed getting ready to walk.  02 sat 95% on RA, no accessory muscle use, NAD.      PAST MEDICAL & SURGICAL HISTORY:  DM (diabetes mellitus)  - resolved    AV block, 1st degree    Arrhythmia    CAD (coronary artery disease)    HTN (hypertension)    VT (ventricular tachycardia)    RICHARDS (dyspnea on exertion)    Anemia    Risk factors for obstructive sleep apnea    ESRD on dialysis  HD on Mondays and Fridays    Aortic stenosis  - s/p valve replacement    GI bleeding  due to ulcerated polyps and colonic AVMs    H/O circumcision  at  age  65    H/O left knee surgery    H/O angioplasty  2013,  no  intervention    A-V fistula  left arm 5/2017    H/O carotid endarterectomy  Right    S/P TAVR (transcatheter aortic valve replacement)    History of loop recorder        MEDICATIONS  (STANDING):  atorvastatin 80 milliGRAM(s) Oral at bedtime  clopidogrel Tablet 75 milliGRAM(s) Oral daily  DULoxetine 60 milliGRAM(s) Oral daily  epoetin juan-epbx (RETACRIT) Injectable 92888 Unit(s) SubCutaneous <User Schedule>  heparin  Infusion.  Unit(s)/Hr (12 mL/Hr) IV Continuous <Continuous>  isosorbide   mononitrate ER Tablet (IMDUR) 30 milliGRAM(s) Oral daily  NIFEdipine XL 90 milliGRAM(s) Oral daily  NIFEdipine XL 60 milliGRAM(s) Oral at bedtime  pantoprazole    Tablet 40 milliGRAM(s) Oral two times a day  tamsulosin 0.4 milliGRAM(s) Oral at bedtime  torsemide 20 milliGRAM(s) Oral <User Schedule>    MEDICATIONS  (PRN):    Antiplatelet therapy:             heparin gtt              Last dose/amt: last ptt 97    Allergies    Plavix (Hives)  Toprol-XL (Rash)    Intolerances        SOCIAL HISTORY:  Smoker: [ ] Yes  [x ] No        PACK YEARS:                         WHEN QUIT?  ETOH use: [ ] Yes  [x ] No              FREQUENCY / QUANTITY:  Ilicit Drug use:  [ ] Yes  [x ] No      FAMILY HISTORY:  Family history of cancer (Grandparent)    Family history of lung cancer (Grandparent)    Family history of premature CAD    FH: type 2 diabetes        Review of Systems All ROS negative  CONSTITUTIONAL:  Fevers[ ] chills[ ] sweats[ ] fatigue[ ] weight loss[ ] weight gain [ ]                                     NEGATIVE [ ]   NEURO:  parathesias[ ] seizures [ ]  syncope [ ]  confusion [ ]                                                                                NEGATIVE[ ]   EYES: glasses[ ]  blurry vision[ ]  discharge[ ] pain[ ] glaucoma [ ]                                                                          NEGATIVE[ ]   ENMT:  difficulty hearing [ ]  vertigo[ ]  dysphagia[ ] epistaxis[ ] recent dental work [ ]                                    NEGATIVE[ ]   CV:  chest pain[ ] palpitations[ ] RICHARDS [ ] diaphoresis [ ]                                                                                           NEGATIVE[ ]   RESPIRATORY:  wheezing[ ] SOB[ ] cough [ ] sputum[ ] hemoptysis[ ]                                                                  NEGATIVE[ ]   GI:  nausea[ ]  vommiting [ ]  diarrhea[ ] constipation [ ] melena [ ]                                                                      NEGATIVE[ ]   : hematuria[ ]  dysuria[ ] urgency[ ] incontinence[ ]                                                                                            NEGATIVE[ ]   MUSKULOSKELETAL:  arthritis[ ]  joint swelling [ ] muscle weakness [ ]                                                                NEGATIVE[ ]   SKIN/BREAST:  rash[ ] itching [ ]  hair loss[ ] masses[ ]                                                                                              NEGATIVE[ ]   PSYCH:  dementia [ ] depresion [ ] anxiety[ ]                                                                                                               NEGATIVE[ ]   HEME/LYMPH:  bruises easily[ ] enlarged lymph nodes[ ] tender lymph nodes[ ]                                               NEGATIVE[ ]   ENDOCRINE:  cold intolerance[ ] heat intolerance[ ] polydipsia[ ]                                                                          NEGATIVE[ ]     PHYSICAL EXAM  Vital Signs Last 24 Hrs  T(C): 36.6 (07 Jun 2021 08:00), Max: 36.7 (06 Jun 2021 10:00)  T(F): 97.9 (07 Jun 2021 08:00), Max: 98.1 (06 Jun 2021 20:00)  HR: 76 (07 Jun 2021 08:00) (69 - 77)  BP: 144/58 (07 Jun 2021 08:00) (137/51 - 167/59)  BP(mean): 78 (07 Jun 2021 08:00) (73 - 104)  RR: 18 (07 Jun 2021 08:00) (18 - 20)  SpO2: 92% (07 Jun 2021 08:00) (92% - 99%)    CONSTITUTIONAL:                                                                          WNL[ ]   Neuro: WNL[x ] Normal exam oriented to person/placewith no focal motor or sensory  deficits. Other __________                    Eyes: WNL[x ]   Normal exam of conjunctiva & lids, pupils equally reactive. Other______________________________     ENT: WNL[x ]    Normal exam of nasal/oral mucosa with absence of cyanosis. Other_____________________________  Neck: WNL[x ]  Normal exam of jugular veins, trachea & thyroid. Other_________________________________________  Chest: WNL[ ] Normal lung exam with good air movement absence of wheezes, rales, or rhonchi: Other_______________absent BS L base, no r/r/w right__________________________                                                                                CV:  Auscultation: normal [x ] S3[ ] S4[ ] Irregular [ ] Rub[ ] Clicks[ ]    Murmurs none:[x ]systolic [ ]  diastolic [ ] holosystolic [ ]  Carotids: No Bruits[ ] Other____________ Abdominal Aorta: normal [ ] nonpalpable[ ]Other___________                                                                                      GI: WNL[x ] Normal exam of abdomen, liver & spleen with no noted masses or tenderness. Other______________________                                                                                                        Extremities: WNL[ ] Normal no evidence of cyanosis or deformity Edema: none[ ]trace[ ]1+[ ]2+[ ]3+[ ]4+[ ]  Lower Extremity Pulses: Right[ ] Left[ ]Varicosities[ ]  SKIN :WNL[ ] Normal exam to inspection & palation. Other:____________________                                                          LABS:                        9.9    8.80  )-----------( 252      ( 07 Jun 2021 05:28 )             32.8     06-07    138  |  98  |  37.5<H>  ----------------------------<  102<H>  4.3   |  25.0  |  4.65<H>    Ca    9.1      07 Jun 2021 05:28    TPro  6.2<L>  /  Alb  2.8<L>  /  TBili  0.5  /  DBili  x   /  AST  189<H>  /  ALT  116<H>  /  AlkPhos  101  06-06    PTT - ( 07 Jun 2021 05:28 )  PTT:96.7 sec            ct< from: CT Chest No Cont (06.06.21 @ 19:24) >  IMPRESSION:  Moderate left pleural effusion with complete collapse left lower lobe and partial collapse lingula.    < end of copied text >

## 2021-06-07 NOTE — PROGRESS NOTE ADULT - SUBJECTIVE AND OBJECTIVE BOX
Patient was seen and evaluated on dialysis.   Patient is tolerating the procedure well.   T(C): 36.7 (06-07-21 @ 10:00), Max: 36.7 (06-06-21 @ 20:00)  HR: 76 (06-07-21 @ 10:00) (69 - 77)  BP: 155/84 (06-07-21 @ 10:00) (144/58 - 167/59)  Continue dialysis:   Dialyzer: Revaclear 300  QB:   400 ml QD: 500ml.,  Goal UF 1.5 L over 3.5 Hours

## 2021-06-07 NOTE — DIETITIAN NUTRITION RISK NOTIFICATION - ADDITIONAL COMMENTS/DIETITIAN RECOMMENDATIONS
Nepro TID to optimize po intake and provide an additional 425 kcal, 19.1g protein per serving   RX: nephro-pito daily

## 2021-06-07 NOTE — PROGRESS NOTE ADULT - ATTENDING COMMENTS
Attending Attestation:  I personally saw and evaluated the patient and agree with the above assessment and plan  Changes made above where appropriate    EXAM:  General: NAD, calm, cooperative  Lungs: CTAB  Heart: irregular rate  Abdomen: NT, ND, soft  Ext: LLE is in ACE/kerlix wrap  Neuro: alert and oriented to person, place and time; RLE 4/5 compared to LLE which is 3/5 strength; no slurred speech noted; follows commands  Skin: intact    Brief A/P:  87 y/o M with PMHx of HTN, HLD, CAD, HFpEF, s/p Right CEA for Carotid artery disease, ESRD, severe PAD and history of revascularization, recent admission with GI bleeds (from AVM) as well as DVT (not on AC and not with IVC filter) who is presenting with worsening ambulation over the past few days as well as slurred speech that was noticed by the daughter and patient was brought to the ED. 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 LE doppler shows extension of previously noted DVT. Vascular, neurosurgery consulted. At this time will challenge with heparin without a bolus. Patient currently on SDU with plan for q2 vitals and close monitoring. Daughter updated on plan of care. She would prefer for acute rehab on discharge. At this time will defer physiatry consult until more clinically stable. Per daughter patient tolerated heparin drip including lovenox while at [place], documentation from that time in April shows he was on heparin 5000 u sub q8 hours however.
patient seen and eval. agree with above.   awake , alert x 2-3   comfortable   on hd this am   noted to have left pleural effusion on  ct   ct sx consult called   recommending to hold heparin gtt at least 2 hours prior to tap   plan for pleural tap later today by ct sx   patient and daughter Vanessa called on phone and notified.
Attending Attestation:  I personally saw and evaluated the patient and agree with the above assessment and plan  Changes made above where appropriate    EXAM:  General: NAD, calm, cooperative  Lungs: CTAB  Heart: irregular rate  Abdomen: NT, ND, soft  Ext: LLE is in ACE/kerlix wrap  Neuro: alert and oriented to person, place and time; RLE 4/5 compared to LLE which is 3/5 strength; no slurred speech noted; follows commands  Skin: intact    Brief A/P:  87 y/o M with PMHx of HTN, HLD, CAD, HFpEF, s/p Right CEA for Carotid artery disease, ESRD, severe PAD and history of revascularization, recent admission with GI bleeds (from AVM) as well as DVT (not on AC and not with IVC filter) who is presenting with worsening ambulation over the past few days as well as slurred speech that was noticed by the daughter and patient was brought to the ED. 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 LE doppler shows extension of previously noted DVT. Vascular, neurosurgery consulted. At this time will challenge with heparin without a bolus. Patient currently on SDU with plan for q2 vitals and close monitoring. Daughter updated on plan of care. She would prefer for acute rehab on discharge. At this time will defer physiatry consult until more clinically stable. Per daughter patient tolerated heparin drip including lovenox while at [place], documentation from that time in April shows he was on heparin 5000 u sub q8 hours however.
NSGY Attg:    see above    patient seen and examined    agree with exam as above    MRI TLS spine without epidural hematoma  DDD with stenosis at L4-5  MRI cervical spine cancelled by primary team    will review CT head 6 hours after PTT therapeutic
Attending Attestation:  I personally saw and evaluated the patient and agree with the above assessment and plan  Changes made above where appropriate    EXAM:  General: NAD, calm, cooperative  Lungs: CTAB  Heart: irregular rate  Abdomen: NT, ND, soft  Ext: LLE is swollen and more darker in hue compared to the right  Neuro: alert and oriented to person, place and time; RLE 4/5 compared to LLE which is 3/5 strength; no slurred speech noted; follows commands  Skin: intact    Brief A/P:  87 y/o M with PMHx of HTN, HLD, CAD, HFpEF, s/p Right CEA for Carotid artery disease, ESRD, severe PAD and history of revascularization, recent admission with GI bleeds (from AVM) as well as DVT (not on AC and not with IVC filter) who is presenting with worsening ambulation over the past few days as well as slurred speech that was noticed by the daughter and patient was brought to the ED. I discussed with the daughter today regarding patient's recent history, she notes that patient also had increased leg swelling and erythema on the left and she applied an ace bandage wrap to it which it improved, he never complained of pain there. IT was the slurred speech that prompted her to bring her father to the ED. Patient at this time feels back to baseline. We ordered doppler of the lower extremities today which showed propagation of the left DVT. We discussed with Dr. Mason vascular surgery who was consulted this evening. They would like to trial on anticoagulation to see if patient can tolerate. Recommending starting with no bolus. Patient's MRI of the brain was also performed and patient is shown to have what appears to be a chronic subdural hematoma. Discussed with Dr. Contreras neurosurgery at this time recommending repeat CT head once the PTT is therapeutic between 50-70. I discussed with daughter, Vanessa yao that I would like to start on full dose anticoagulation. We will transfer to step down unit for now for q2 hour vitals in order to monitor for bleeding (either GI or neurological) while on heparin drip. Will discuss with the on call nocturndaya lopez.
agree

## 2021-06-07 NOTE — DIETITIAN INITIAL EVALUATION ADULT. - ADD RECOMMEND
Nepro TID to optimize po intake and provide an additional 425 kcal, 19.1g protein per serving. RX: nephro-pito daily. Encourage po intake and HBV protein. Monitor wts and labs.

## 2021-06-07 NOTE — DIETITIAN INITIAL EVALUATION ADULT. - PERTINENT LABORATORY DATA
06-07 Na138 mmol/L Glu 102 mg/dL<H> K+ 4.3 mmol/L Cr  4.65 mg/dL<H> BUN 37.5 mg/dL<H> Phos n/a   Alb n/a   PAB n/a

## 2021-06-07 NOTE — PROGRESS NOTE ADULT - ASSESSMENT
Assessment  Generalized debility, no evidence of acute CVA  remote AF in SR with back up pacing  s/p TAVR nomal EF  ESRD on HD  New LLE DVT  h/o GIBs in past  chronic anemia    Rec  cont IV heparin for now  for HD today

## 2021-06-07 NOTE — DIETITIAN INITIAL EVALUATION ADULT. - OTHER INFO
85 y/o M with PMHx of HTN, HLD, CAD, HFpEF, s/p Right CEA for Carotid artery disease, ESRD on HD 2d/week (M/Fri) via LUE fistula, s/p flecainide w/ ILR placed 6/2020, AS s/p TAVR, and PAD (RLE fem-pop bypass October 2020) multiple gastric AVM's s/p cauterized s/p LLE fem-pop bypass, and had the bypass performed on 3/20/21, post procedure developed L femoral DVT and s/p revision of LLE bypass 3/25/21 brought to ED with cc of b/l LE ext weakness with difficulty to ambulate for past 2 days. In addition, per daughter pt has slurred speech since yesterday. In ED CT head negative however has elevated trop. Pt Denies any sob, no chest pain, no n/v or abd pain. Per pt has lower extremity weakness and cant walk. Pt being dialyzed during assessment. Pt continues with fair-good po intake, consuming % of meals. Last documented BM 6/4. K+/phos WNL. Pt verbalized understanding of renal restrictions and regularly sees RD at HD. RD to remain available.

## 2021-06-07 NOTE — PROGRESS NOTE ADULT - SUBJECTIVE AND OBJECTIVE BOX
[facility]                                        [facility] [place]                                 Ligia Sorenson, [address]                                  [address]                                        [place], [place], [address]                                             [phone]        CC: Gait difficulty    HPI:   The patient is a 87y Male who presented with difficulty walking for 3 days.  He has history of multiple surgeries for PAD on his lower extremities, in March he had left fem-pop bypass complicated by DVT and went to rehab.  He was able to walk about 15-20 feet with a walker until it was too painful and he had to stop.  For the past three days he has noticed worsening b/l foot numbness with difficulty walking and dragging his feet.    Interim history:  Remains on [address] [place].     ROS:   Denies headache or dizziness.  Denies chest pain.  Denies shortness of breath.    MEDICATIONS  (STANDING):  atorvastatin 80 milliGRAM(s) Oral at bedtime  clopidogrel Tablet 75 milliGRAM(s) Oral daily  DULoxetine 60 milliGRAM(s) Oral daily  epoetin juan-epbx (RETACRIT) Injectable 16501 Unit(s) SubCutaneous <User Schedule>  heparin  Infusion.  Unit(s)/Hr (12 mL/Hr) IV Continuous <Continuous>  isosorbide   mononitrate ER Tablet (IMDUR) 30 milliGRAM(s) Oral daily  NIFEdipine XL 90 milliGRAM(s) Oral daily  NIFEdipine XL 60 milliGRAM(s) Oral at bedtime  pantoprazole    Tablet 40 milliGRAM(s) Oral two times a day  tamsulosin 0.4 milliGRAM(s) Oral at bedtime  torsemide 20 milliGRAM(s) Oral <User Schedule>      Vital Signs Last 24 Hrs  T(C): 36.7 (07 Jun 2021 10:00), Max: 36.7 (06 Jun 2021 20:00)  T(F): 98 (07 Jun 2021 10:00), Max: 98.1 (06 Jun 2021 20:00)  HR: 76 (07 Jun 2021 10:00) (69 - 77)  BP: 155/84 (07 Jun 2021 10:00) (144/58 - 167/59)  BP(mean): 78 (07 Jun 2021 08:00) (78 - 104)  RR: 18 (07 Jun 2021 10:00) (18 - 20)  SpO2: 95% (07 Jun 2021 10:00) (92% - 99%)    Detailed Neurologic Exam:    Mental status: The patient is sleepy but awakens readily to voice. Answers appropriately and follows instructions.    Cranial nerves: Pupils equal and react symmetrically to light. There is no visual field deficit to confrontation. Extraocular motion is full with no nystagmus. There is no ptosis. Facial sensation is intact. Facial musculature is symmetric. Palate elevates symmetrically.  Tongue is midline.    Motor: There is normal bulk and tone.  There is no tremor.  Strength is 5/5 in the right arm and 4+/5leg.   Strength is 5/5 in the left arm and 4-/5 leg.    Sensation: Intact to light touch and pin in 4 extremities, except for decreased at both feet    Reflexes: 1+ throughout and plantar responses are equivocal.    Cerebellar: There is no dysmetria on finger to nose testing.    Labs:     06-07    138  |  98  |  37.5<H>  ----------------------------<  102<H>  4.3   |  25.0  |  4.65<H>    Ca    9.1      07 Jun 2021 05:28    TPro  6.2<L>  /  Alb  2.8<L>  /  TBili  0.5  /  DBili  x   /  AST  189<H>  /  ALT  116<H>  /  AlkPhos  101  06-06                            9.9    8.80  )-----------( 252      ( 07 Jun 2021 05:28 )             32.8       Rad:   MRI thoracic and lumbar spine: There is multilevel degenerative change and disc bulging.   There are hemangiomas at multiple vertebral levels.   There is no evidence of epidural collection and no cord signal abnormality.

## 2021-06-07 NOTE — CONSULT NOTE ADULT - ASSESSMENT
86M multiple medical conditions on heparin gtt for LLE DVT f/w incidental finding of L pleural effusion on MRI thoracic spine confirmed with CTA chest.  PT stable NAD.

## 2021-06-07 NOTE — DIETITIAN NUTRITION RISK NOTIFICATION - TREATMENT: THE FOLLOWING DIET HAS BEEN RECOMMENDED
Diet, DASH/TLC:   Sodium & Cholesterol Restricted  For patients receiving Renal Replacement - No Protein Restr, No Conc K, No Conc Phos, Low  Sodium (RENAL) (06-02-21 @ 14:25) [Active]

## 2021-06-07 NOTE — PROGRESS NOTE ADULT - ASSESSMENT
87y Male who is followed by neurology because of leg weakness    Leg weakness  Likely related to previous vascular issues, extension of left LE DVT.  MRI brain did not show stroke  PT/OT  Continue antiplatelet and statin.   Mobilize with physical therapy.     DVT  On heparin.    Pleural effusion.  CT surgery following.     Eventual subacute rehabilitation.  Plan for thoracentesis.

## 2021-06-08 ENCOUNTER — APPOINTMENT (OUTPATIENT)
Dept: VASCULAR SURGERY | Facility: CLINIC | Age: 86
End: 2021-06-08

## 2021-06-08 ENCOUNTER — OUTPATIENT (OUTPATIENT)
Dept: OUTPATIENT SERVICES | Facility: HOSPITAL | Age: 86
LOS: 1 days | Discharge: HOME | End: 2021-06-08

## 2021-06-08 DIAGNOSIS — Z98.890 OTHER SPECIFIED POSTPROCEDURAL STATES: Chronic | ICD-10-CM

## 2021-06-08 DIAGNOSIS — D63.1 ANEMIA IN CHRONIC KIDNEY DISEASE: ICD-10-CM

## 2021-06-08 DIAGNOSIS — Z98.62 PERIPHERAL VASCULAR ANGIOPLASTY STATUS: Chronic | ICD-10-CM

## 2021-06-08 DIAGNOSIS — Z95.2 PRESENCE OF PROSTHETIC HEART VALVE: Chronic | ICD-10-CM

## 2021-06-08 DIAGNOSIS — I77.0 ARTERIOVENOUS FISTULA, ACQUIRED: Chronic | ICD-10-CM

## 2021-06-08 LAB
ALBUMIN FLD-MCNC: 1.2 G/DL — SIGNIFICANT CHANGE UP
ALBUMIN SERPL ELPH-MCNC: 2.6 G/DL — LOW (ref 3.3–5.2)
ALP SERPL-CCNC: 97 U/L — SIGNIFICANT CHANGE UP (ref 40–120)
ALT FLD-CCNC: 154 U/L — HIGH
ANION GAP SERPL CALC-SCNC: 11 MMOL/L — SIGNIFICANT CHANGE UP (ref 5–17)
APTT BLD: 129.1 SEC — CRITICAL HIGH (ref 27.5–35.5)
APTT BLD: 170.5 SEC — CRITICAL HIGH (ref 27.5–35.5)
APTT BLD: 55.1 SEC — HIGH (ref 27.5–35.5)
APTT BLD: 91.6 SEC — HIGH (ref 27.5–35.5)
AST SERPL-CCNC: 188 U/L — HIGH
BILIRUB SERPL-MCNC: 0.5 MG/DL — SIGNIFICANT CHANGE UP (ref 0.4–2)
BUN SERPL-MCNC: 20 MG/DL — SIGNIFICANT CHANGE UP (ref 8–20)
CALCIUM SERPL-MCNC: 9 MG/DL — SIGNIFICANT CHANGE UP (ref 8.6–10.2)
CHLORIDE SERPL-SCNC: 101 MMOL/L — SIGNIFICANT CHANGE UP (ref 98–107)
CO2 SERPL-SCNC: 28 MMOL/L — SIGNIFICANT CHANGE UP (ref 22–29)
CREAT SERPL-MCNC: 2.95 MG/DL — HIGH (ref 0.5–1.3)
GLUCOSE FLD-MCNC: 112 MG/DL — SIGNIFICANT CHANGE UP
GLUCOSE SERPL-MCNC: 102 MG/DL — HIGH (ref 70–99)
GRAM STN FLD: SIGNIFICANT CHANGE UP
HCT VFR BLD CALC: 32.6 % — LOW (ref 39–50)
HGB BLD-MCNC: 9.8 G/DL — LOW (ref 13–17)
LDH SERPL L TO P-CCNC: 144 U/L — SIGNIFICANT CHANGE UP
MAGNESIUM SERPL-MCNC: 1.8 MG/DL — SIGNIFICANT CHANGE UP (ref 1.6–2.6)
MCHC RBC-ENTMCNC: 29.6 PG — SIGNIFICANT CHANGE UP (ref 27–34)
MCHC RBC-ENTMCNC: 30.1 GM/DL — LOW (ref 32–36)
MCV RBC AUTO: 98.5 FL — SIGNIFICANT CHANGE UP (ref 80–100)
PHOSPHATE SERPL-MCNC: 4.2 MG/DL — SIGNIFICANT CHANGE UP (ref 2.4–4.7)
PLATELET # BLD AUTO: 252 K/UL — SIGNIFICANT CHANGE UP (ref 150–400)
POTASSIUM SERPL-MCNC: 4.4 MMOL/L — SIGNIFICANT CHANGE UP (ref 3.5–5.3)
POTASSIUM SERPL-SCNC: 4.4 MMOL/L — SIGNIFICANT CHANGE UP (ref 3.5–5.3)
PROT FLD-MCNC: 2.4 G/DL — SIGNIFICANT CHANGE UP
PROT SERPL-MCNC: 6.1 G/DL — LOW (ref 6.6–8.7)
RBC # BLD: 3.31 M/UL — LOW (ref 4.2–5.8)
RBC # FLD: 20 % — HIGH (ref 10.3–14.5)
SODIUM SERPL-SCNC: 140 MMOL/L — SIGNIFICANT CHANGE UP (ref 135–145)
SPECIMEN SOURCE: SIGNIFICANT CHANGE UP
WBC # BLD: 8.51 K/UL — SIGNIFICANT CHANGE UP (ref 3.8–10.5)
WBC # FLD AUTO: 8.51 K/UL — SIGNIFICANT CHANGE UP (ref 3.8–10.5)

## 2021-06-08 PROCEDURE — 71045 X-RAY EXAM CHEST 1 VIEW: CPT | Mod: 26

## 2021-06-08 PROCEDURE — 99223 1ST HOSP IP/OBS HIGH 75: CPT

## 2021-06-08 PROCEDURE — 99233 SBSQ HOSP IP/OBS HIGH 50: CPT

## 2021-06-08 PROCEDURE — 99232 SBSQ HOSP IP/OBS MODERATE 35: CPT

## 2021-06-08 PROCEDURE — 99231 SBSQ HOSP IP/OBS SF/LOW 25: CPT

## 2021-06-08 RX ORDER — ENOXAPARIN SODIUM 100 MG/ML
65 INJECTION SUBCUTANEOUS DAILY
Refills: 0 | Status: DISCONTINUED | OUTPATIENT
Start: 2021-06-08 | End: 2021-06-21

## 2021-06-08 RX ORDER — HYDRALAZINE HCL 50 MG
5 TABLET ORAL ONCE
Refills: 0 | Status: COMPLETED | OUTPATIENT
Start: 2021-06-08 | End: 2021-06-08

## 2021-06-08 RX ADMIN — PANTOPRAZOLE SODIUM 40 MILLIGRAM(S): 20 TABLET, DELAYED RELEASE ORAL at 05:40

## 2021-06-08 RX ADMIN — CLOPIDOGREL BISULFATE 75 MILLIGRAM(S): 75 TABLET, FILM COATED ORAL at 12:12

## 2021-06-08 RX ADMIN — ISOSORBIDE MONONITRATE 30 MILLIGRAM(S): 60 TABLET, EXTENDED RELEASE ORAL at 12:12

## 2021-06-08 RX ADMIN — PANTOPRAZOLE SODIUM 40 MILLIGRAM(S): 20 TABLET, DELAYED RELEASE ORAL at 17:00

## 2021-06-08 RX ADMIN — HEPARIN SODIUM 1300 UNIT(S)/HR: 5000 INJECTION INTRAVENOUS; SUBCUTANEOUS at 02:33

## 2021-06-08 RX ADMIN — ENOXAPARIN SODIUM 65 MILLIGRAM(S): 100 INJECTION SUBCUTANEOUS at 23:34

## 2021-06-08 RX ADMIN — DULOXETINE HYDROCHLORIDE 60 MILLIGRAM(S): 30 CAPSULE, DELAYED RELEASE ORAL at 12:13

## 2021-06-08 RX ADMIN — ATORVASTATIN CALCIUM 80 MILLIGRAM(S): 80 TABLET, FILM COATED ORAL at 21:03

## 2021-06-08 RX ADMIN — Medication 60 MILLIGRAM(S): at 21:04

## 2021-06-08 RX ADMIN — Medication 90 MILLIGRAM(S): at 05:40

## 2021-06-08 RX ADMIN — Medication 5 MILLIGRAM(S): at 23:35

## 2021-06-08 RX ADMIN — TAMSULOSIN HYDROCHLORIDE 0.4 MILLIGRAM(S): 0.4 CAPSULE ORAL at 21:04

## 2021-06-08 RX ADMIN — HEPARIN SODIUM 1300 UNIT(S)/HR: 5000 INJECTION INTRAVENOUS; SUBCUTANEOUS at 10:19

## 2021-06-08 NOTE — PROGRESS NOTE ADULT - SUBJECTIVE AND OBJECTIVE BOX
Subjective: "I feel tired" Pt oob to chair, appears fatigued, no acute distress.    Vital Signs:  Vital Signs Last 24 Hrs  T(C): 36.6 (06-08-21 @ 09:58), Max: 36.7 (06-08-21 @ 04:00)  T(F): 97.9 (06-08-21 @ 09:58), Max: 98 (06-08-21 @ 04:00)  HR: 70 (06-08-21 @ 12:01) (70 - 75)  BP: 147/77 (06-08-21 @ 12:01) (143/60 - 156/67)  RR: 18 (06-08-21 @ 12:01) (18 - 18)  SpO2: 98% (06-08-21 @ 12:01) (98% - 100%)      Relevant labs, radiology and Medications reviewed    Pertinent Physical Exam  Gen: WN/WD NAD  Neuro: A+Ox3, nonfocal  Pulm: diminished left base  CV: RRR  LE: no edema  left pigtail without air leak, with ~200 cc serosang drainage today    CXR: < from: Xray Chest 1 View- PORTABLE-Routine (Xray Chest 1 View- PORTABLE-Routine in AM.) (06.08.21 @ 05:23) >    IMPRESSION:   LEFT chest tube in place. Clearing of LEFT basilar airspace disease and effusion..      < end of copied text >

## 2021-06-08 NOTE — PROGRESS NOTE ADULT - SUBJECTIVE AND OBJECTIVE BOX
[facility]                                        Neurology at [place]                                 Ligia Sorenson, [address]                                  [address]                                        [place], [place], [address]                                             [phone]        CC: Gait difficulty    HPI:   The patient is a 87y Male who presented with difficulty walking for 3 days.  He has history of multiple surgeries for PAD on his lower extremities, in March he had left fem-pop bypass complicated by DVT and went to rehab.  He was able to walk about 15-20 feet with a walker until it was too painful and he had to stop.  For the past three days he has noticed worsening b/l foot numbness with difficulty walking and dragging his feet.    Interim history:  Remains on 4 [place].   Now status post left chest tube placement.    ROS:   Denies headache or dizziness.  Denies chest pain.  Denies shortness of breath.    MEDICATIONS  (STANDING):  atorvastatin 80 milliGRAM(s) Oral at bedtime  clopidogrel Tablet 75 milliGRAM(s) Oral daily  DULoxetine 60 milliGRAM(s) Oral daily  epoetin juan-epbx (RETACRIT) Injectable 11914 Unit(s) SubCutaneous <User Schedule>  epoetin juan-epbx (RETACRIT) Injectable 67087 Unit(s) IV Push <User Schedule>  heparin  Infusion.  Unit(s)/Hr (12 mL/Hr) IV Continuous <Continuous>  isosorbide   mononitrate ER Tablet (IMDUR) 30 milliGRAM(s) Oral daily  NIFEdipine XL 90 milliGRAM(s) Oral daily  NIFEdipine XL 60 milliGRAM(s) Oral at bedtime  pantoprazole    Tablet 40 milliGRAM(s) Oral two times a day  tamsulosin 0.4 milliGRAM(s) Oral at bedtime  torsemide 20 milliGRAM(s) Oral <User Schedule>      Vital Signs Last 24 Hrs  T(C): 36.7 (08 Jun 2021 04:00), Max: 36.7 (08 Jun 2021 04:00)  T(F): 98 (08 Jun 2021 04:00), Max: 98 (08 Jun 2021 04:00)  HR: 71 (08 Jun 2021 08:00) (70 - 88)  BP: 156/67 (08 Jun 2021 08:00) (138/58 - 173/63)  BP(mean): 90 (08 Jun 2021 08:00) (78 - 92)  RR: 18 (08 Jun 2021 08:00) (18 - 18)  SpO2: 98% (08 Jun 2021 08:00) (98% - 100%)    Detailed Neurologic Exam:    Mental status: The patient is awake and alert. There is no aphasia. There is no dysarthria.     Cranial nerves: Pupils equal and react symmetrically to light. There is no visual field deficit to confrontation. Extraocular motion is full with no nystagmus. There is no ptosis. Facial sensation is intact. Facial musculature is symmetric. Palate elevates symmetrically.  Tongue is midline.    Motor: There is normal bulk and tone.  There is no tremor.  Strength is 5/5 in the right arm and 4+/5leg.   Strength is 5/5 in the left arm and 4-/5 leg.    Sensation: Intact to light touch and pin in 4 extremities, except for decreased at both feet    Reflexes: 1+ throughout and plantar responses are equivocal.    Cerebellar: There is no dysmetria on finger to nose testing.    Labs:     06-08    140  |  101  |  20.0  ----------------------------<  102<H>  4.4   |  28.0  |  2.95<H>    Ca    9.0      08 Jun 2021 08:42  Phos  4.2     06-08  Mg     1.8     06-08    TPro  6.1<L>  /  Alb  2.6<L>  /  TBili  0.5  /  DBili  x   /  AST  188<H>  /  ALT  154<H>  /  AlkPhos  97  06-08                            9.8    8.51  )-----------( 252      ( 08 Jun 2021 08:43 )             32.6

## 2021-06-08 NOTE — PROGRESS NOTE ADULT - ASSESSMENT
86M multiple medical conditions on heparin gtt for LLE DVT f/w incidental finding of L pleural effusion on MRI thoracic spine confirmed with CTA chest.  PT stable NAD. 6/7 pigtail cath placed left chest with 1400 cc drainage.    6/8 pigtail cath continues to drain, will maintain.

## 2021-06-08 NOTE — CONSULT NOTE ADULT - CONSULT REQUESTED DATE/TIME
08-Jun-2021 13:23
02-Jun-2021 16:26
02-Jun-2021 13:43
02-Jun-2021 16:58
07-Jun-2021
03-Jun-2021 17:43
03-Jun-2021 12:24

## 2021-06-08 NOTE — PROGRESS NOTE ADULT - ASSESSMENT
85 y/o M with PMHx of HTN, HLD, CAD, HFpEF, s/p Right CEA for Carotid artery disease, ESRD on HD 2d/week (M/Fri) via LUE fistula, s/p flecainide w/ ILR placed 6/2020, AS s/p TAVR, and PAD (RLE fem-pop bypass October 2020) multiple gastric AVM's s/p cauterized s/p LLE fem-pop bypass, and had the bypass performed on 3/20/21, post procedure developed L femoral DVT and s/p revision of LLE bypass 3/25/21 brought to ED with cc of b/l LE ext weakness with difficulty to ambulate for past 2 days. In addition, per daughter pt has slurred speech since yesterday. In ED CT head negative however has elevated trop. Pt Denies any sob, no chest pain, no n/v or abd pain. Per pt has lower extremity weakness and cant walk. (02 Jun 2021 14:43)    # B/l LE weakness  -Acute CVA rule out  -Symptoms likely secondary to progressing/extensive DVT (Duplex: New extension of LEFT lower extremity thrombus into the LEFT femoral vein)  -CT head negative for acute pathology  -MRI brain: Trace bilateral frontal subdural collections suggesting subdural hematomas or subdural hygromas.  -Seen in consult by neuro and neurosurgical teams  -MRI C/T/L spine ordered by neurosurgery, no acute actionable findings   -Continue Lipitor 80mg po qhs and Plavix 75mg daily   -Will continue heparin drip for now incase further inpt procedures planned, eventual changed to renal dosed lovenox     #Left large pleural effusion  -Incidental finding on MRI spine imaging. Pt is currently asymptomatic, not short of breath and stable on room air   -CT Chest ordered for further eval: Moderate left pleural effusion with complete collapse left lower lobe and partial collapse lingula.  -Unclear etiology, possibly secondary to fluid shifts/oncotic pressures  -s/p drainage 6/7   -Follow pleural fluid analysis     #Left Lower Extremity DVT  -B/L LE edema, L>R  -Venous duplex b/l LE: New extension of LLE thrombus into L femoral vein  -MRI head with subdural collection suggestive of subdural hematoma or hygroma  -MRI spine with no acute actionable findings  -Vascular surgery consult appreciated: no IVC filter recommended at this time   -discussed with dr. navarro. will change to 1mg / kg qd dose due to renal failure  , dc plavix after todays dose     #Elevated Troponin levels in setting of ESRD  -Asymptomatic   -EKG: NSR, rate 82/min, first degree AV block   -TTE w/o WMA  -Continue telemetry monitoring   -Cardiology consult appreciated, no further recs/work up     #Generalized weakness, leg weakness     -Likely due to LLE edema that is prominent  -PT eval appreciated: home w/ assist; home w/ home PT vs [facility]  -PTs family pushing for acute rehab     #PVD/HLD/HTN  -Continue Plavix statin, nifedipine Imdur    #ESRD on HD  -Continue HD  -Nephrology consult appreciated     #Depression  -Continue duloxetine    #Hx of GI bleed due to gastric AVMs, follows with dr. barrera as op   -Monitor h/h   -Continue PPI    DISPO: pmnr consult , downgrade to tele

## 2021-06-08 NOTE — PROGRESS NOTE ADULT - ASSESSMENT
ESRD on HD  Left moderate pleural effusion s/p thoracentesis  LLE DVT- on heparin drip  Anemia of CKD      Continue HD MF schedule  Needs therapeutic anticoagulation; however will recommend against Lovenox. He needs therapeutic doses and serious bleeding complications have been reported with use in patients who are dialysis dependent or have severe kidney failure (Paramjit 2009).  Hb low, continue AMY Monitor H/H.

## 2021-06-08 NOTE — PROVIDER CONTACT NOTE (CRITICAL VALUE NOTIFICATION) - ACTION/TREATMENT ORDERED:
HEPARIN WAS DC EARLIER , PLAN IS TO START LOVENOX 2 HRS AFTER HEPARIN WAS DC.
pause gtt and re-darw aptt

## 2021-06-08 NOTE — CONSULT NOTE ADULT - SUBJECTIVE AND OBJECTIVE BOX
87yM was admitted on 06-02 with weakness. CVA was ruled out and found to have extension of the left DVT. Course was complicated by pleural effusion s/p CT.     Imaging performed:  MRI HEAD 6/3 - 1.  No acute infarct. 2.  Trace bilateral frontal subdural collections suggesting subdural hematomas or subdural hygromas.    BLE DOPPLER 6/3 - New extension of LEFT lower extremity thrombus into the LEFT femoral vein.    MRI Thoracic spine 6/4 1.  No evidence of epidural collection. 2.  There are T1, T2, and STIR hyperintense lesions in the T10 vertebral body and left T3 pedicle. These are nonspecific but could represent atypical hemangiomas. 3.  Large left pleural effusion. Consider CT chest.    MRI Lumbar spine 6/4 - 1.  No evidence of epidural collection. 2.  Lumbar degenerative changes. At L4-L5, there is severe spinal canal narrowing. 3.  T2 and STIR hyperintense lesions are noted in the L3 and L5 vertebral bodies, which are nonspecific but could represent atypical hemangiomas.    CT CHEST 6/6 - Moderate left pleural effusion with complete collapse left lower lobe and partial collapse lingula.    CXR 6/7 - Moderate left pleural effusion with chest tube in place and no pneumothorax. Small right pleural effusion, better characterized on CT.    Patient feels weak.  Reports he has no pain.  Did work with PT today and made it about 5 feet.  Spoke with daughter Gabi who wants him back at [facility].     REVIEW OF SYSTEMS  Constitutional - No fever,  +fatigue  HEENT - No eye pain, No visual disturbances, No difficulty hearing, No tinnitus, No vertigo, No neck pain  Respiratory - No cough, No wheezing, +shortness of breath  Cardiovascular - No chest pain, No palpitations  Gastrointestinal - No abdominal pain, No nausea, No vomiting, No diarrhea, No constipation  Genitourinary - No dysuria, No frequency, No hematuria, No incontinence  Neurological - No headaches, No memory loss, +loss of strength, No numbness, No tremors  Skin - No itching, No rashes, +lesions   Endocrine - No temperature intolerance  Musculoskeletal - No joint pain, No joint swelling, No muscle pain  Psychiatric - +depression, No anxiety    VITALS  T(C): 36.6 (06-08-21 @ 09:58), Max: 36.7 (06-08-21 @ 04:00)  HR: 71 (06-08-21 @ 08:00) (70 - 88)  BP: 156/67 (06-08-21 @ 08:00) (138/58 - 164/63)  RR: 18 (06-08-21 @ 08:00) (18 - 18)  SpO2: 98% (06-08-21 @ 08:00) (98% - 100%)  Wt(kg): --    PAST MEDICAL & SURGICAL HISTORY  DM (diabetes mellitus)    AV block, 1st degree    Arrhythmia    CKD (chronic kidney disease)    CAD (coronary artery disease)    HTN (hypertension)    VT (ventricular tachycardia)    RICHARDS (dyspnea on exertion)    Anemia    Risk factors for obstructive sleep apnea    ESRD on dialysis    Aortic stenosis    GI bleeding    H/O circumcision    H/O left knee surgery    H/O angioplasty    A-V fistula    H/O carotid endarterectomy    S/P TAVR (transcatheter aortic valve replacement)    History of loop recorder        SOCIAL HISTORY - as per documentation/history  Smoking - None  EtOH - None  Drugs - None    FUNCTIONAL HISTORY  Lives with spouse, 3 JESSICA  Unable to determine post-AR functional level    CURRENT FUNCTIONAL STATUS  6/8  Bed Mobility  Bed Mobility Training Supine-to-Sit: moderate assist (50% patient effort);  1 person assist;  verbal cues;  bed rails  Bed Mobility Training Limitations: decreased ability to use arms for pushing/pulling;  impaired ability to control trunk for mobility;  decreased strength    Sit-Stand Transfer Training  Transfer Training Sit-to-Stand Transfer: moderate assist (50% patient effort);  2 person assist;  verbal cues;  weight-bearing as tolerated   rolling walker  Transfer Training Stand-to-Sit Transfer: moderate assist (50% patient effort);  2 person assist;  verbal cues;  weight-bearing as tolerated   rolling walker  Sit-to-Stand Transfer Training Transfer Safety Analysis: decreased sequencing ability;  decreased weight-shifting ability;  decreased strength;  cognitive, decreased safety awareness;  rolling walker    Gait Training  Gait Training: moderate assist (50% patient effort);  2 person assist;  verbal cues;  weight-bearing as tolerated   rolling walker;  5 feet  Gait Analysis: swing-to gait   crouch;  decreased hip/knee flexion;  shuffling;  decreased step length;  decreased chadd;  increased time in double stance;  decreased strength;  cognitive, decreased safety awareness;  5 feet;  rolling walker      FAMILY HISTORY   No pertinent family history in first degree relatives    Family history of cancer (Grandparent)    Family history of lung cancer (Grandparent)    Family history of premature CAD    FH: type 2 diabetes        RECENT LABS - Reviewed  CBC Full  -  ( 08 Jun 2021 08:43 )  WBC Count : 8.51 K/uL  RBC Count : 3.31 M/uL  Hemoglobin : 9.8 g/dL  Hematocrit : 32.6 %  Platelet Count - Automated : 252 K/uL  Mean Cell Volume : 98.5 fl  Mean Cell Hemoglobin : 29.6 pg  Mean Cell Hemoglobin Concentration : 30.1 gm/dL  Auto Neutrophil # : x  Auto Lymphocyte # : x  Auto Monocyte # : x  Auto Eosinophil # : x  Auto Basophil # : x  Auto Neutrophil % : x  Auto Lymphocyte % : x  Auto Monocyte % : x  Auto Eosinophil % : x  Auto Basophil % : x    06-08    140  |  101  |  20.0  ----------------------------<  102<H>  4.4   |  28.0  |  2.95<H>    Ca    9.0      08 Jun 2021 08:42  Phos  4.2     06-08  Mg     1.8     06-08    TPro  6.1<L>  /  Alb  2.6<L>  /  TBili  0.5  /  DBili  x   /  AST  188<H>  /  ALT  154<H>  /  AlkPhos  97  06-08        ALLERGIES  Plavix (Hives)  Toprol-XL (Rash)      MEDICATIONS   atorvastatin 80 milliGRAM(s) Oral at bedtime  clopidogrel Tablet 75 milliGRAM(s) Oral daily  DULoxetine 60 milliGRAM(s) Oral daily  epoetin juan-epbx (RETACRIT) Injectable 86049 Unit(s) SubCutaneous <User Schedule>  epoetin juan-epbx (RETACRIT) Injectable 90192 Unit(s) IV Push <User Schedule>  heparin  Infusion.  Unit(s)/Hr IV Continuous <Continuous>  isosorbide   mononitrate ER Tablet (IMDUR) 30 milliGRAM(s) Oral daily  NIFEdipine XL 90 milliGRAM(s) Oral daily  NIFEdipine XL 60 milliGRAM(s) Oral at bedtime  pantoprazole    Tablet 40 milliGRAM(s) Oral two times a day  tamsulosin 0.4 milliGRAM(s) Oral at bedtime  torsemide 20 milliGRAM(s) Oral <User Schedule>      ----------------------------------------------------------------------------------------  PHYSICAL EXAM  Constitutional - NAD, Comfortable  HEENT - NCAT, EOMI  Neck - Supple, No limited ROM  Chest - Increased work of breathing, No wheezing  Cardiovascular - S1S2   Abdomen - Soft   Extremities - Left LE bandaged   Neurologic Exam -                    Cognitive - AAO to self, place, date, year, situation     Motor -                      LEFT    UE - ShAB 4/5, EF 5/5, EE 5/5, WE 5/5,  5/5                    RIGHT UE - ShAB 4/5, EF 5/5, EE 5/5, WE 5/5,  5/5                    LEFT    LE - HF 3/5, KE 4/5, DF 2/5                     RIGHT LE - HF 4/5, KE 4/5, DF 4/5      Sensory - Intact to LT  Psychiatric - Mood depressed  ----------------------------------------------------------------------------------------  ASSESSMENT/PLAN  87yMale with functional deficits after developing BLE weakness  Left LE DVT extension - Heparin  CAD - Plavix, Lipitor  CHF - Demadex  Pleural Effusion - Chest Tube x1  HTN - Imdur, [place]  Pain - Tylenol, Cymbalta  DVT PPX - SCDs  Rehab - Patient with debility and currently mobilizing mod assist x2. Discussed with daughter about options for AR. Reiterated that given patient was discharged 2 months ago for debility, may not accept given diagnosis is again debility. Discussed case with rehab liaison who will discuss case with administration for approval. Patient has managed insurance and will need prior auth. If patient is ultimately denied, daughter states she cannot take patient home.     Patient is still medically being optimized, has a chest tube. May in the interim improve functionally for possible dispo HOME. Otherwise, will need [facility].     Will continue to follow. Functional progress will determine ongoing rehab dispo recommendations, which may change.    Continue bedside therapy as well as OOB throughout the day with mobilization by staff to maintain cardiopulmonary function and prevention of secondary complications related to debility.     Discussed with rehab team.

## 2021-06-08 NOTE — PROGRESS NOTE ADULT - SUBJECTIVE AND OBJECTIVE BOX
cc: cva , dvt , elevated trops     inetrval hx; patient seen and gladys. daughter at bedisde. patient awake , alert x 3 , comfortable. in no acute distress. no fever or chills. s/p left pleural cath drainage 1400 cc drained.       Vital Signs Last 24 Hrs  T(C): 36.6 (08 Jun 2021 09:58), Max: 36.7 (08 Jun 2021 04:00)  T(F): 97.9 (08 Jun 2021 09:58), Max: 98 (08 Jun 2021 04:00)  HR: 70 (08 Jun 2021 12:01) (70 - 75)  BP: 147/77 (08 Jun 2021 12:01) (138/58 - 156/67)  BP(mean): 90 (08 Jun 2021 08:00) (78 - 90)  RR: 18 (08 Jun 2021 12:01) (18 - 18)  SpO2: 98% (08 Jun 2021 12:01) (98% - 100%)      PHYSICAL EXAM:  GEN: NAD, comfortable  HEENT: NC/AT,  PERRLA, MMM, clear conjunctiva and sclera, normal hearing   CV: S1S2, RRR, no mumur  RESP: decreased breath sounds to LLL base , left pleurex cath in place   ABD: +BS, soft, ND, NT, no guarding, no rigidity, no HSM  EXT: +2 radial and pedal pulses, b/l LE edema, L>R  NEURO: no neuro deficits, moves all extremities, follows all commands  PSYCH: normal behavior                             9.8    8.51  )-----------( 252      ( 08 Jun 2021 08:43 )             32.6   06-08    140  |  101  |  20.0  ----------------------------<  102<H>  4.4   |  28.0  |  2.95<H>    Ca    9.0      08 Jun 2021 08:42  Phos  4.2     06-08  Mg     1.8     06-08    TPro  6.1<L>  /  Alb  2.6<L>  /  TBili  0.5  /  DBili  x   /  AST  188<H>  /  ALT  154<H>  /  AlkPhos  97  06-08      IMAGING:     < from: CT Head No Cont (06.05.21 @ 08:20) >  IMPRESSION:  No evidence of acute intracranial abnormality.  No evidence of hemorrhage.  Chronic changes as above.  < end of copied text >    RADIOLOGY:  < from: CT Brain Stroke Protocol (06.02.21 @ 12:59) >  FINDINGS:    There is no acute intracranial hemorrhage or mass effect. There are areas of hypodensity in the bilateral hemispheric white matter suggesting chronic white matter microvascular ischemic change. There is cerebral volume loss. A calcification in the left parietal lobe is again seen.    There is no extraaxial fluid collection.    Intracranial atherosclerotic calcifications.    There is no displaced calvarial fracture. The visualized orbits are within normal limits. The visualized portions of the paranasal sinuses are well aerated. The mastoid air cells are well aerated.      IMPRESSION:    No evidence of intracranial hemorrhage or mass effect. If clinical suspicion for acute infarct persists, brain MRI can be obtained if there is no contraindication.    < from: MR Head No Cont (06.03.21 @ 13:53) >    There is no evidence of acute infarct. Chronic right pontine lacunar infarct. A few small foci of susceptibility artifact in the bilateral cerebral hemispheres suggesting chronic microhemorrhages. Scattered foci of T2/FLAIR hyperintensity in the bilateral hemispheric white matter are nonspecific but likely related to chronic white matter microvascular ischemic disease. There is cerebral volume loss. Trace bilateral frontal subdural collections suggesting subdural hematomas or subdural hygromas.    Flow voids of the major intracranial vessels at the skull base follow expected course and contour.    The paranasal sinuses demonstrate no signal abnormality. The mastoids demonstrate no signal abnormality.  Bilateral orbits are within normal limits.      IMPRESSION:  1.  No acute infarct.  2.  Trace bilateral frontal subdural collections suggesting subdural hematomas or subdural hygromas.    < from: US Duplex Venous Lower Ext Complete, Bilateral (06.03.21 @ 14:52) >  FINDINGS:    RIGHT:  Normal compressibility of the RIGHT common femoral, femoral and popliteal veins.  Doppler examination shows normal spontaneous and phasic flow.  No RIGHT calf vein thrombosis is detected.    LEFT:  There is no evidence of thrombus within the LEFT femoral vein as well as popliteal vein. Flow is seen in the posterior tibial vein. The peroneal vein is not visualized.    IMPRESSION:  New extension of LEFT lower extremity thrombus into the LEFT femoral vein.    < from: MR Lumbar Spine No Cont (06.04.21 @ 11:23) >  Thoracic spine:  1.  No evidence of epidural collection.  2.  There are T1, T2, and STIR hyperintense lesions in the T10 vertebral body and left T3 pedicle. These are nonspecific but could represent atypical hemangiomas.  3.  Large left pleural effusion. Consider CT chest.    Lumbar spine:  1.  No evidence of epidural collection.  2.  Lumbar degenerative changes. At L4-L5, there is severe spinal canal narrowing.  3.  T2 and STIR hyperin

## 2021-06-08 NOTE — PROGRESS NOTE ADULT - ASSESSMENT
87y Male who is followed by neurology because of leg weakness    Leg weakness  Likely related to previous vascular issues, extension of left LE DVT.  MRI brain did not show stroke  PT/OT  Continue antiplatelet and statin.   Mobilize with physical therapy.     DVT  On heparin.    Pleural effusion.  CT surgery following.   Now status post left chest tube.

## 2021-06-08 NOTE — CONSULT NOTE ADULT - REASON FOR ADMISSION
Lower extremity weakness and elevated trop level
? CVA
Lower extremity weakness and elevated trop level

## 2021-06-08 NOTE — PROGRESS NOTE ADULT - SUBJECTIVE AND OBJECTIVE BOX
[facility] DIVISION OF KIDNEY DISEASES AND HYPERTENSION -- HEMODIALYSIS NOTE  --------------------------------------------------------------------------------  Chief Complaint: ESRD/Ongoing hemodialysis requirement    24 hour events/subjective:        PAST HISTORY  --------------------------------------------------------------------------------  No significant changes to PMH, PSH, FHx, SHx, unless otherwise noted    ALLERGIES & MEDICATIONS  --------------------------------------------------------------------------------  Allergies    Plavix (Hives)  Toprol-XL (Rash)    Intolerances      Standing Inpatient Medications  atorvastatin 80 milliGRAM(s) Oral at bedtime  clopidogrel Tablet 75 milliGRAM(s) Oral daily  DULoxetine 60 milliGRAM(s) Oral daily  epoetin juan-epbx (RETACRIT) Injectable 88512 Unit(s) SubCutaneous <User Schedule>  epoetin juan-epbx (RETACRIT) Injectable 83976 Unit(s) IV Push <User Schedule>  heparin  Infusion.  Unit(s)/Hr IV Continuous <Continuous>  isosorbide   mononitrate ER Tablet (IMDUR) 30 milliGRAM(s) Oral daily  NIFEdipine XL 90 milliGRAM(s) Oral daily  NIFEdipine XL 60 milliGRAM(s) Oral at bedtime  pantoprazole    Tablet 40 milliGRAM(s) Oral two times a day  tamsulosin 0.4 milliGRAM(s) Oral at bedtime  torsemide 20 milliGRAM(s) Oral <User Schedule>    PRN Inpatient Medications      REVIEW OF SYSTEMS  --------------------------------------------------------------------------------  Gen: No weight changes, fatigue, fevers/chills, weakness  Skin: No rashes  Head/Eyes/Ears/Mouth: No headache  Respiratory: No dyspnea, cough,  CV: No chest pain, orthopnea  GI: No abdominal pain, diarrhea, constipation, nausea, vomiting,  MSK: No joint pain  Neuro: No dizziness/lightheadedness, weakness  Heme: No bleeding  Psych: No significant nervousness, anxiety, stress, depression    All other systems were reviewed and are negative, except as noted.    VITALS/PHYSICAL EXAM  --------------------------------------------------------------------------------  T(C): 36.7 (06-08-21 @ 04:00), Max: 36.7 (06-08-21 @ 04:00)  HR: 71 (06-08-21 @ 08:00) (70 - 88)  BP: 156/67 (06-08-21 @ 08:00) (138/58 - 173/63)  RR: 18 (06-08-21 @ 08:00) (18 - 18)  SpO2: 98% (06-08-21 @ 08:00) (98% - 100%)  Wt(kg): --        06-07-21 @ 07:01  -  06-08-21 @ 07:00  --------------------------------------------------------  IN: 389 mL / OUT: 3100 mL / NET: -2711 mL      Physical Exam:  	Gen: NAD  	HEENT: PERRL, supple neck,  	Pulm: left chest tube+  	CV: RRR, S1S2; no rub  	Abd: +BS, soft, nontender  	UE: Warm, intact strength; no asterixis  	LE: Warm, left leg wrapped in ACE bandage  	Neuro: No focal deficits  	Psych: Normal affect and mood  	Skin: Warm, without rashes  	Vascular access: RUE AVF    LABS/STUDIES  --------------------------------------------------------------------------------              9.8    8.51  >-----------<  252      [06-08-21 @ 08:43]              32.6     140  |  101  |  20.0  ----------------------------<  102      [06-08-21 @ 08:42]  4.4   |  28.0  |  2.95        Ca     9.0     [06-08-21 @ 08:42]      Mg     1.8     [06-08-21 @ 08:42]      Phos  4.2     [06-08-21 @ 08:42]    TPro  6.1  /  Alb  2.6  /  TBili  0.5  /  DBili  x   /  AST  188  /  ALT  154  /  AlkPhos  97  [06-08-21 @ 08:42]      PTT: 91.6       [06-08-21 @ 08:44]      Iron 53, TIBC 266, %sat 20      [08-19-20 @ 06:32]  Ferritin 184      [08-19-20 @ 06:32]  PTH -- (Ca 9.6)      [08-19-20 @ 16:08]   91  Vitamin D (25OH) 26.4      [08-19-20 @ 16:08]  HbA1c 4.9      [08-09-18 @ 05:54]  TSH 2.16      [09-15-20 @ 02:01]  Lipid: chol 126, , HDL 60, LDL --      [06-03-21 @ 06:07]

## 2021-06-09 LAB
ANION GAP SERPL CALC-SCNC: 13 MMOL/L — SIGNIFICANT CHANGE UP (ref 5–17)
BUN SERPL-MCNC: 27 MG/DL — HIGH (ref 8–20)
CALCIUM SERPL-MCNC: 8.7 MG/DL — SIGNIFICANT CHANGE UP (ref 8.6–10.2)
CHLORIDE SERPL-SCNC: 100 MMOL/L — SIGNIFICANT CHANGE UP (ref 98–107)
CO2 SERPL-SCNC: 27 MMOL/L — SIGNIFICANT CHANGE UP (ref 22–29)
CREAT SERPL-MCNC: 3.8 MG/DL — HIGH (ref 0.5–1.3)
GLUCOSE SERPL-MCNC: 91 MG/DL — SIGNIFICANT CHANGE UP (ref 70–99)
HCT VFR BLD CALC: 31.7 % — LOW (ref 39–50)
HGB BLD-MCNC: 9.3 G/DL — LOW (ref 13–17)
MCHC RBC-ENTMCNC: 28.8 PG — SIGNIFICANT CHANGE UP (ref 27–34)
MCHC RBC-ENTMCNC: 29.3 GM/DL — LOW (ref 32–36)
MCV RBC AUTO: 98.1 FL — SIGNIFICANT CHANGE UP (ref 80–100)
NON-GYNECOLOGICAL CYTOLOGY STUDY: SIGNIFICANT CHANGE UP
PLATELET # BLD AUTO: 246 K/UL — SIGNIFICANT CHANGE UP (ref 150–400)
POTASSIUM SERPL-MCNC: 4.7 MMOL/L — SIGNIFICANT CHANGE UP (ref 3.5–5.3)
POTASSIUM SERPL-SCNC: 4.7 MMOL/L — SIGNIFICANT CHANGE UP (ref 3.5–5.3)
RBC # BLD: 3.23 M/UL — LOW (ref 4.2–5.8)
RBC # FLD: 19.4 % — HIGH (ref 10.3–14.5)
SODIUM SERPL-SCNC: 140 MMOL/L — SIGNIFICANT CHANGE UP (ref 135–145)
WBC # BLD: 8.01 K/UL — SIGNIFICANT CHANGE UP (ref 3.8–10.5)
WBC # FLD AUTO: 8.01 K/UL — SIGNIFICANT CHANGE UP (ref 3.8–10.5)

## 2021-06-09 PROCEDURE — 99233 SBSQ HOSP IP/OBS HIGH 50: CPT

## 2021-06-09 PROCEDURE — 99232 SBSQ HOSP IP/OBS MODERATE 35: CPT

## 2021-06-09 PROCEDURE — 71045 X-RAY EXAM CHEST 1 VIEW: CPT | Mod: 26

## 2021-06-09 RX ADMIN — Medication 90 MILLIGRAM(S): at 05:45

## 2021-06-09 RX ADMIN — ISOSORBIDE MONONITRATE 30 MILLIGRAM(S): 60 TABLET, EXTENDED RELEASE ORAL at 10:58

## 2021-06-09 RX ADMIN — Medication 60 MILLIGRAM(S): at 22:08

## 2021-06-09 RX ADMIN — ATORVASTATIN CALCIUM 80 MILLIGRAM(S): 80 TABLET, FILM COATED ORAL at 22:08

## 2021-06-09 RX ADMIN — TAMSULOSIN HYDROCHLORIDE 0.4 MILLIGRAM(S): 0.4 CAPSULE ORAL at 22:08

## 2021-06-09 RX ADMIN — Medication 20 MILLIGRAM(S): at 10:58

## 2021-06-09 RX ADMIN — DULOXETINE HYDROCHLORIDE 60 MILLIGRAM(S): 30 CAPSULE, DELAYED RELEASE ORAL at 10:58

## 2021-06-09 RX ADMIN — ENOXAPARIN SODIUM 65 MILLIGRAM(S): 100 INJECTION SUBCUTANEOUS at 22:08

## 2021-06-09 RX ADMIN — PANTOPRAZOLE SODIUM 40 MILLIGRAM(S): 20 TABLET, DELAYED RELEASE ORAL at 05:45

## 2021-06-09 RX ADMIN — PANTOPRAZOLE SODIUM 40 MILLIGRAM(S): 20 TABLET, DELAYED RELEASE ORAL at 17:43

## 2021-06-09 NOTE — PROGRESS NOTE ADULT - ASSESSMENT
87y Male who is followed by neurology because of leg weakness    Leg weakness  Likely related to previous vascular issues, extension of left LE DVT.  MRI brain did not show stroke  PT/OT  Continue antiplatelet and statin.   Mobilize with physical therapy.     DVT  On lovenox.    Pleural effusion.  CT surgery following.   Now status post left chest tube.     will follow with you    Bassam Strong MD PhD   [id number]

## 2021-06-09 NOTE — PROGRESS NOTE ADULT - ASSESSMENT
87 y/o M with PMHx of HTN, HLD, CAD, HFpEF, s/p Right CEA for Carotid artery disease, ESRD on HD 2d/week (M/Fri) via LUE fistula, s/p flecainide w/ ILR placed 6/2020, AS s/p TAVR, and PAD (RLE fem-pop bypass October 2020) multiple gastric AVM's s/p cauterized s/p LLE fem-pop bypass, and had the bypass performed on 3/20/21, post procedure developed L femoral DVT and s/p revision of LLE bypass 3/25/21 brought to ED with cc of b/l LE ext weakness with difficulty to ambulate for past 2 days. In addition, per daughter pt has slurred speech since yesterday. In ED CT head negative however has elevated trop. Pt Denies any sob, no chest pain, no n/v or abd pain. Per pt has lower extremity weakness and cant walk. (02 Jun 2021 14:43)    # B/l LE weakness/ -Symptoms likely secondary to progressing/extensive DVT (Duplex: New extension of LEFT lower extremity thrombus into the LEFT femoral vein)  -Acute CVA ruled  out  -CT head negative for acute pathology  -MRI brain: Trace bilateral frontal subdural collections suggesting subdural hematomas or subdural hygromas.  -Seen in consult by neuro and neurosurgical teams  -MRI C/T/L spine ordered by neurosurgery, no acute actionable findings   -Continue Lipitor 80mg po qhs and Plavix 75mg daily   -changed to renal dosed lovenox     #Left large pleural effusion  -Incidental finding on MRI spine imaging. Pt is currently asymptomatic, not short of breath and stable on room air   -CT Chest ordered for further eval: Moderate left pleural effusion with complete collapse left lower lobe and partial collapse lingula.  -Unclear etiology, possibly secondary to fluid shifts/oncotic pressures  -s/p drainage 6/7   -Follow pleural fluid analysis     #Left Lower Extremity DVT  -B/L LE edema, L>R  -Venous duplex b/l LE: New extension of LLE thrombus into L femoral vein  -MRI head with subdural collection suggestive of subdural hematoma or hygroma  -MRI spine with no acute actionable findings  -Vascular surgery consult appreciated: no IVC filter recommended at this time   -discussed with dr. navarro. changed to 1mg / kg qd dose due to renal failure  , dc plavix     #Elevated Troponin levels in setting of ESRD  -Asymptomatic   -EKG: NSR, rate 82/min, first degree AV block   -TTE w/o WMA  -Continue telemetry monitoring   -Cardiology consult appreciated, no further recs/work up     #Generalized weakness, leg weakness     -Likely due to LLE edema that is prominent  -PT eval appreciated: home w/ assist; home w/ home PT vs [facility]  -  family wishing acute rehab       #PVD/HLD/HTN  -c/w statin, nifedipine Imdur  - dcd plavix     #ESRD on HD  -Continue HD  -Nephrology following     #Depression  -Continue duloxetine    #Hx of GI bleed due to gastric AVMs, follows with dr. barrera as op   -Monitor h/h   -Continue PPI    DISPO: acute rehab vs [facility] , dc planning

## 2021-06-09 NOTE — PROGRESS NOTE ADULT - ASSESSMENT
ESRD on HD  Left moderate pleural effusion s/p thoracentesis  LLE DVT- on heparin drip  Anemia of CKD    Continue HD MF schedule    dw Dr Riggins

## 2021-06-09 NOTE — PROGRESS NOTE ADULT - SUBJECTIVE AND OBJECTIVE BOX
Subjective: Patient sitting in chair, no acute distress noted, denies chest pian, pressure, palpitation, shortness of breath, abdominal pain      V/S  T(C): 36.6 (06-09-21 @ 09:52), Max: 36.9 (06-09-21 @ 05:43)  HR: 75 (06-09-21 @ 09:52) (68 - 80)  BP: 150/56 (06-09-21 @ 09:52) (145/58 - 174/71)  ABP: --  ABP(mean): --  RR: 18 (06-09-21 @ 09:52) (16 - 18)  SpO2: 95% (06-09-21 @ 09:52) (94% - 98%)  Wt(kg): --  CVP(mm Hg): --  CO: --  CI: --  PA: --                                                Tele:    CHEST TUBE:                           OUTPUT:             per 24 hours     Drainage:    AIR LEAKS:  [ ] YES [ ] NO      MEDICATIONS  (STANDING):  atorvastatin 80 milliGRAM(s) Oral at bedtime  DULoxetine 60 milliGRAM(s) Oral daily  enoxaparin Injectable 65 milliGRAM(s) SubCutaneous daily  epoetin juan-epbx (RETACRIT) Injectable 02456 Unit(s) IV Push <User Schedule>  epoetin juan-epbx (RETACRIT) Injectable 99044 Unit(s) SubCutaneous <User Schedule>  isosorbide   mononitrate ER Tablet (IMDUR) 30 milliGRAM(s) Oral daily  NIFEdipine XL 90 milliGRAM(s) Oral daily  NIFEdipine XL 60 milliGRAM(s) Oral at bedtime  pantoprazole    Tablet 40 milliGRAM(s) Oral two times a day  tamsulosin 0.4 milliGRAM(s) Oral at bedtime  torsemide 20 milliGRAM(s) Oral <User Schedule>      06-09    140  |  100  |  27.0<H>  ----------------------------<  91  4.7   |  27.0  |  3.80<H>    Ca    8.7      09 Jun 2021 06:01  Phos  4.2     06-08  Mg     1.8     06-08    TPro  6.1<L>  /  Alb  2.6<L>  /  TBili  0.5  /  DBili  x   /  AST  188<H>  /  ALT  154<H>  /  AlkPhos  97  06-08                               9.3    8.01  )-----------( 246      ( 09 Jun 2021 06:01 )             31.7        PTT - ( 08 Jun 2021 17:33 )  PTT:170.5 sec         CAPILLARY BLOOD GLUCOSE               CXR:      Physical Exam:    Neuro:     Pulm:     CV:    Abd:     Extremities:     Incision:              PAST MEDICAL & SURGICAL HISTORY:  DM (diabetes mellitus)  - resolved    AV block, 1st degree    Arrhythmia    CAD (coronary artery disease)    HTN (hypertension)    VT (ventricular tachycardia)    RICHARDS (dyspnea on exertion)    Anemia    Risk factors for obstructive sleep apnea    ESRD on dialysis  HD on Mondays and Fridays    Aortic stenosis  - s/p valve replacement    GI bleeding  due to ulcerated polyps and colonic AVMs    H/O circumcision  at  age  65    H/O left knee surgery    H/O angioplasty  2013,  no  intervention    A-V fistula  left arm 5/2017    H/O carotid endarterectomy  Right    S/P TAVR (transcatheter aortic valve replacement)    History of loop recorder             Subjective: Patient sitting in chair, no acute distress noted, denies chest pian, pressure, palpitation, shortness of breath, abdominal pain      V/S  T(C): 36.6 (06-09-21 @ 09:52), Max: 36.9 (06-09-21 @ 05:43)  HR: 75 (06-09-21 @ 09:52) (68 - 80)  BP: 150/56 (06-09-21 @ 09:52) (145/58 - 174/71)  RR: 18 (06-09-21 @ 09:52) (16 - 18)  SpO2: 95% (06-09-21 @ 09:52) (94% - 98%) on room air      CHEST TUBE:  Left tiny tube to waterseal OUTPUT:60 ml from today 6 AM.    Drainage: serosanguinous.    AIR LEAKS:  [ ] YES [x] NO      MEDICATIONS  (STANDING):  atorvastatin 80 milliGRAM(s) Oral at bedtime  DULoxetine 60 milliGRAM(s) Oral daily  enoxaparin Injectable 65 milliGRAM(s) SubCutaneous daily  epoetin juan-epbx (RETACRIT) Injectable 18816 Unit(s) IV Push <User Schedule>  epoetin juan-epbx (RETACRIT) Injectable 79911 Unit(s) SubCutaneous <User Schedule>  isosorbide   mononitrate ER Tablet (IMDUR) 30 milliGRAM(s) Oral daily  NIFEdipine XL 90 milliGRAM(s) Oral daily  NIFEdipine XL 60 milliGRAM(s) Oral at bedtime  pantoprazole    Tablet 40 milliGRAM(s) Oral two times a day  tamsulosin 0.4 milliGRAM(s) Oral at bedtime  torsemide 20 milliGRAM(s) Oral <User Schedule>      06-09    140  |  100  |  27.0<H>  ----------------------------<  91  4.7   |  27.0  |  3.80<H>    Ca    8.7      09 Jun 2021 06:01  Phos  4.2     06-08  Mg     1.8     06-08    TPro  6.1<L>  /  Alb  2.6<L>  /  TBili  0.5  /  DBili  x   /  AST  188<H>  /  ALT  154<H>  /  AlkPhos  97  06-08                               9.3    8.01  )-----------( 246      ( 09 Jun 2021 06:01 )             31.7        PTT - ( 08 Jun 2021 17:33 )  PTT:170.5 sec      < from: Xray Chest 1 View- PORTABLE-Routine (Xray Chest 1 View- PORTABLE-Routine in AM.) (06.09.21 @ 05:55) >  INTERPRETATION:  Clinical history: 87-year-old male, pleural effusion.    Two portable/expiratory views are compared to 6/7/2021 and demonstrate a left chest tube in place with no pneumothorax.    Left pleural effusion, improved.    Cardiac silhouette and pulmonary vasculature are within normal limits with no acute osseous finding.    IMPRESSION:  Small left pleural effusion, improved    < end of copied text >           Pertinent Physical Exam  Gen: NAD  Neuro: A+Ox3, nonfocal  Pulm: diminished left base  CV: RRR  LE: no edema  left pigtail without air leak,  serosang drainage today        PAST MEDICAL & SURGICAL HISTORY:  DM (diabetes mellitus  AV block, 1st degree  Arrhythmia  CAD (coronary artery disease)  HTN (hypertension)  VT (ventricular tachycardia)  RICHARDS (dyspnea on exertion)  Anemia  Risk factors for obstructive sleep apnea  ESRD on dialysis, HD on Mondays and Fridays    Aortic stenosis, - s/p TAVR  GI bleeding due to ulcerated polyps and colonic AVMs    H/O circumcision, (  age  65)  H/O left knee surgery  H/O angioplasty  2013,  no  intervention  A-V fistula  left arm 5/2017  H/O carotid endarterectomy  Right  S/P TAVR (transcatheter aortic valve replacement)  History of loop recorder

## 2021-06-09 NOTE — PROGRESS NOTE ADULT - SUBJECTIVE AND OBJECTIVE BOX
[facility], THE [facility]                                   [address] [place] [address]                                                      PHONE: [phone]                                                         FAX: [phone]  [email or web address]  ---------------------------------------------------------------------------------------------------------------------------------    Overnight events/patient complaints:    INTERPRETATION OF TELEMETRY (personally reviewed):    ECG:    ECHO:    STRESS TEST:    CARDIAC CATHETERIZATION:    I&O's Summary    08 Jun 2021 07:01  -  09 Jun 2021 07:00  --------------------------------------------------------  IN: 370 mL / OUT: 420 mL / NET: -50 mL    09 Jun 2021 07:01  -  09 Jun 2021 10:36  --------------------------------------------------------  IN: 0 mL / OUT: 100 mL / NET: -100 mL        PAST MEDICAL & SURGICAL HISTORY:  DM (diabetes mellitus)  - resolved    AV block, 1st degree    Arrhythmia    CAD (coronary artery disease)    HTN (hypertension)    VT (ventricular tachycardia)    RICHARDS (dyspnea on exertion)    Anemia    Risk factors for obstructive sleep apnea    ESRD on dialysis  HD on Mondays and Fridays    Aortic stenosis  - s/p valve replacement    GI bleeding  due to ulcerated polyps and colonic AVMs    H/O circumcision  at  age  65    H/O left knee surgery    H/O angioplasty  2013,  no  intervention    A-V fistula  left arm 5/2017    H/O carotid endarterectomy  Right    S/P TAVR (transcatheter aortic valve replacement)    History of loop recorder        Plavix (Hives)  Toprol-XL (Rash)    MEDICATIONS  (STANDING):  atorvastatin 80 milliGRAM(s) Oral at bedtime  DULoxetine 60 milliGRAM(s) Oral daily  enoxaparin Injectable 65 milliGRAM(s) SubCutaneous daily  epoetin juan-epbx (RETACRIT) Injectable 98238 Unit(s) IV Push <User Schedule>  epoetin juan-epbx (RETACRIT) Injectable 50168 Unit(s) SubCutaneous <User Schedule>  isosorbide   mononitrate ER Tablet (IMDUR) 30 milliGRAM(s) Oral daily  NIFEdipine XL 90 milliGRAM(s) Oral daily  NIFEdipine XL 60 milliGRAM(s) Oral at bedtime  pantoprazole    Tablet 40 milliGRAM(s) Oral two times a day  tamsulosin 0.4 milliGRAM(s) Oral at bedtime  torsemide 20 milliGRAM(s) Oral <User Schedule>    MEDICATIONS  (PRN):      Vital Signs Last 24 Hrs  T(C): 36.6 (09 Jun 2021 09:52), Max: 36.9 (09 Jun 2021 05:43)  T(F): 97.8 (09 Jun 2021 09:52), Max: 98.5 (09 Jun 2021 05:43)  HR: 75 (09 Jun 2021 09:52) (68 - 80)  BP: 150/56 (09 Jun 2021 09:52) (145/58 - 174/71)  BP(mean): 86 (08 Jun 2021 20:14) (86 - 86)  RR: 18 (09 Jun 2021 09:52) (16 - 18)  SpO2: 95% (09 Jun 2021 09:52) (94% - 98%)  ICU Vital Signs Last 24 Hrs    PHYSICAL EXAM:  General: Appears well developed, well nourished alert and cooperative.  HEENT: Head; normocephalic, atraumatic.Pupils reactive, cornea wnl. Neck supple, no nodes adenopathy, no JVD  CARDIOVASCULAR: Normal S1 and S2, 1/6 DEANA, no rub, gallop or lift.   LUNGS: No rales, rhonchi or wheeze. Normal breath sounds bilaterally.  ABDOMEN: Soft, nontender without mass or organomegaly. bowel sounds normoactive.  EXTREMITIES: No clubbing, cyanosis or edema.   SKIN: warm and dry with normal turgor.  NEURO: Alert/oriented x 3/normal motor exam.   PSYCH: normal affect.        LABS:                        9.3    8.01  )-----------( 246      ( 09 Jun 2021 06:01 )             31.7     06-09    140  |  100  |  27.0<H>  ----------------------------<  91  4.7   |  27.0  |  3.80<H>    Ca    8.7      09 Jun 2021 06:01  Phos  4.2     06-08  Mg     1.8     06-08    TPro  6.1<L>  /  Alb  2.6<L>  /  TBili  0.5  /  DBili  x   /  AST  188<H>  /  ALT  154<H>  /  AlkPhos  97  06-08    CHRISTIANO ELIZABETH      PTT - ( 08 Jun 2021 17:33 )  PTT:170.5 sec      RADIOLOGY & ADDITIONAL STUDIES:    ASSESSMENT AND PLAN:  In summary, CHRISTIANO ELIZABETH is a 87y Male with past medical history significant for     Thank you for allowing [facility] to participate in the care of this patient.  Please feel free to call with any questions or concerns.                  [facility], THE HEART [facility]ENTER                                   [address] [place] [address]                                                      PHONE: [phone]                                                         FAX: [phone]  [email or web address]  ---------------------------------------------------------------------------------------------------------------------------------    Overnight events/patient complaints: chest tube is in place. concerned about bilateral LE weakness     INTERPRETATION OF TELEMETRY (personally reviewed)    < from: TTE Echo Complete w/o Contrast w/ Doppler (06.02.21 @ 21:45) >   1. There is moderate concentric left ventricular hypertrophy.   2. Normal global left ventricular systolic function.   3. Left ventricular ejection fraction, by visual estimation, is 60 to 65%.   4. Normal right ventricular size and function.   5. Severely enlarged left atrium.   6. Moderately enlarged right atrium.   7. Bioprosthesis in the aortic position with normal gradients.   8. Moderate mitral valve regurgitation.   9. Moderate mitral annular calcification.  10. Moderate tricuspid regurgitation.  11. Estimated pulmonary artery systolic pressure is 80.6 mmHg assuming a right atrial pressure of 8 mmHg, which is consistent with severe pulmonary hypertension.  12. Moderate mitral stenosis, increased gradient partly due to tachycardia.      I&O's Summary    08 Jun 2021 07:01  -  09 Jun 2021 07:00  --------------------------------------------------------  IN: 370 mL / OUT: 420 mL / NET: -50 mL    09 Jun 2021 07:01  -  09 Jun 2021 10:36  --------------------------------------------------------  IN: 0 mL / OUT: 100 mL / NET: -100 mL        PAST MEDICAL & SURGICAL HISTORY:  DM (diabetes mellitus)  - resolved    AV block, 1st degree    Arrhythmia    CAD (coronary artery disease)    HTN (hypertension)    VT (ventricular tachycardia)    RICHARDS (dyspnea on exertion)    Anemia    Risk factors for obstructive sleep apnea    ESRD on dialysis  HD on Mondays and Fridays    Aortic stenosis  - s/p valve replacement    GI bleeding  due to ulcerated polyps and colonic AVMs    H/O circumcision  at  age  65    H/O left knee surgery    H/O angioplasty  2013,  no  intervention    A-V fistula  left arm 5/2017    H/O carotid endarterectomy  Right    S/P TAVR (transcatheter aortic valve replacement)    History of loop recorder        Plavix (Hives)  Toprol-XL (Rash)    MEDICATIONS  (STANDING):  atorvastatin 80 milliGRAM(s) Oral at bedtime  DULoxetine 60 milliGRAM(s) Oral daily  enoxaparin Injectable 65 milliGRAM(s) SubCutaneous daily  epoetin juan-epbx (RETACRIT) Injectable 11052 Unit(s) IV Push <User Schedule>  epoetin juan-epbx (RETACRIT) Injectable 58549 Unit(s) SubCutaneous <User Schedule>  isosorbide   mononitrate ER Tablet (IMDUR) 30 milliGRAM(s) Oral daily  NIFEdipine XL 90 milliGRAM(s) Oral daily  NIFEdipine XL 60 milliGRAM(s) Oral at bedtime  pantoprazole    Tablet 40 milliGRAM(s) Oral two times a day  tamsulosin 0.4 milliGRAM(s) Oral at bedtime  torsemide 20 milliGRAM(s) Oral <User Schedule>    MEDICATIONS  (PRN):      Vital Signs Last 24 Hrs  T(C): 36.6 (09 Jun 2021 09:52), Max: 36.9 (09 Jun 2021 05:43)  T(F): 97.8 (09 Jun 2021 09:52), Max: 98.5 (09 Jun 2021 05:43)  HR: 75 (09 Jun 2021 09:52) (68 - 80)  BP: 150/56 (09 Jun 2021 09:52) (145/58 - 174/71)  BP(mean): 86 (08 Jun 2021 20:14) (86 - 86)  RR: 18 (09 Jun 2021 09:52) (16 - 18)  SpO2: 95% (09 Jun 2021 09:52) (94% - 98%)  ICU Vital Signs Last 24 Hrs    PHYSICAL EXAM:  General: Appears well developed, well nourished alert and cooperative.  HEENT: Head; normocephalic, atraumatic.Pupils reactive, cornea wnl. Neck supple, no nodes adenopathy, (+) JVD  CARDIOVASCULAR: Normal S1 and S2, 1/6 DEANA, no rub, gallop or lift.   LUNGS: No rales, rhonchi or wheeze. decreased breath sounds at bilateral bases   ABDOMEN: Soft, nontender without mass or organomegaly. bowel sounds normoactive.  EXTREMITIES: No clubbing, cyanosis; (+)  L>R edema.   SKIN: warm and dry with normal turgor.  NEURO: Alert/oriented x 3/normal motor exam.   PSYCH: normal affect.        LABS:                        9.3    8.01  )-----------( 246      ( 09 Jun 2021 06:01 )             31.7     06-09    140  |  100  |  27.0<H>  ----------------------------<  91  4.7   |  27.0  |  3.80<H>    Ca    8.7      09 Jun 2021 06:01  Phos  4.2     06-08  Mg     1.8     06-08    TPro  6.1<L>  /  Alb  2.6<L>  /  TBili  0.5  /  DBili  x   /  AST  188<H>  /  ALT  154<H>  /  AlkPhos  97  06-08    CHRISTIANO ELIZABETH      PTT - ( 08 Jun 2021 17:33 )  PTT:170.5 sec      RADIOLOGY & ADDITIONAL STUDIES:    ASSESSMENT AND PLAN:  In summary, CHRISTIANO ELIZABETH is a 87y Male with past medical history significant for PAF off AC due to GIBs, leadless PPM, ILR in place, Nonobst CAD with normal EF, AS s/p TAVR, ESRD on HD, min trop elevation likely non MI in setting of ESRD with progressive functional decline found to have DVT.  MRI without acute pathology     Continue Plavix and on heparin gtt for DVT  No significant arrhythmias noted  Chest tube in place, management per thoracic surgery. Torsemide on non HD days [Pt gets HD on Monday and Friday as per pt]    Thank you for allowing [facility] to participate in the care of this patient.  Please feel free to call with any questions or concerns.

## 2021-06-09 NOTE — PROGRESS NOTE ADULT - ASSESSMENT
86M multiple medical conditions on heparin gtt for LLE DVT f/w incidental finding of L pleural effusion on MRI thoracic spine confirmed with CTA chest.  PT stable NAD. 6/7 pigtail cath placed left chest with 1400 cc drainage.    6/9 pigtail cath continues to drain, will maintain.

## 2021-06-09 NOTE — PROGRESS NOTE ADULT - SUBJECTIVE AND OBJECTIVE BOX
Patient in bd, set up for breakfast.  Reports no pain.   Just feels he is not doing well with his leg being so weak.  Still pending decision about AR.    REVIEW OF SYSTEMS  Constitutional - No fever,  +fatigue  Neurological - +loss of strength, No numbness, No tremors  Musculoskeletal - +joint pain, +muscle pain  Psychiatric - +depression, No anxiety    FUNCTIONAL PROGRESS  6/8  Bed Mobility  Bed Mobility Training Supine-to-Sit: moderate assist (50% patient effort);  1 person assist;  verbal cues;  bed rails  Bed Mobility Training Limitations: decreased ability to use arms for pushing/pulling;  impaired ability to control trunk for mobility;  decreased strength    Sit-Stand Transfer Training  Transfer Training Sit-to-Stand Transfer: moderate assist (50% patient effort);  2 person assist;  verbal cues;  weight-bearing as tolerated   rolling walker  Transfer Training Stand-to-Sit Transfer: moderate assist (50% patient effort);  2 person assist;  verbal cues;  weight-bearing as tolerated   rolling walker  Sit-to-Stand Transfer Training Transfer Safety Analysis: decreased sequencing ability;  decreased weight-shifting ability;  decreased strength;  cognitive, decreased safety awareness;  rolling walker    Gait Training  Gait Training: moderate assist (50% patient effort);  2 person assist;  verbal cues;  weight-bearing as tolerated   rolling walker;  5 feet  Gait Analysis: swing-to gait   crouch;  decreased hip/knee flexion;  shuffling;  decreased step length;  decreased chadd;  increased time in double stance;  decreased strength;  cognitive, decreased safety awareness;  5 feet;  rolling walker    VITALS  T(C): 36.9 (06-09-21 @ 05:43), Max: 36.9 (06-09-21 @ 05:43)  HR: 80 (06-09-21 @ 05:43) (68 - 80)  BP: 160/69 (06-09-21 @ 05:43) (145/58 - 174/71)  RR: 17 (06-09-21 @ 05:43) (16 - 18)  SpO2: 94% (06-09-21 @ 05:43) (94% - 98%)  Wt(kg): --    MEDICATIONS   atorvastatin 80 milliGRAM(s) at bedtime  DULoxetine 60 milliGRAM(s) daily  enoxaparin Injectable 65 milliGRAM(s) daily  epoetin juan-epbx (RETACRIT) Injectable 81804 Unit(s) <User Schedule>  epoetin juan-epbx (RETACRIT) Injectable 67594 Unit(s) <User Schedule>  isosorbide   mononitrate ER Tablet (IMDUR) 30 milliGRAM(s) daily  NIFEdipine XL 90 milliGRAM(s) daily  NIFEdipine XL 60 milliGRAM(s) at bedtime  pantoprazole    Tablet 40 milliGRAM(s) two times a day  tamsulosin 0.4 milliGRAM(s) at bedtime  torsemide 20 milliGRAM(s) <User Schedule>      RECENT LABS/IMAGING                          9.3    8.01  )-----------( 246      ( 09 Jun 2021 06:01 )             31.7     06-09    140  |  100  |  27.0<H>  ----------------------------<  91  4.7   |  27.0  |  3.80<H>    Ca    8.7      09 Jun 2021 06:01  Phos  4.2     06-08  Mg     1.8     06-08    TPro  6.1<L>  /  Alb  2.6<L>  /  TBili  0.5  /  DBili  x   /  AST  188<H>  /  ALT  154<H>  /  AlkPhos  97  06-08    PTT - ( 08 Jun 2021 17:33 )  PTT:170.5 sec            MRI HEAD 6/3 - 1.  No acute infarct. 2.  Trace bilateral frontal subdural collections suggesting subdural hematomas or subdural hygromas.    BLE DOPPLER 6/3 - New extension of LEFT lower extremity thrombus into the LEFT femoral vein.    MRI Thoracic spine 6/4 1.  No evidence of epidural collection. 2.  There are T1, T2, and STIR hyperintense lesions in the T10 vertebral body and left T3 pedicle. These are nonspecific but could represent atypical hemangiomas. 3.  Large left pleural effusion. Consider CT chest.    MRI Lumbar spine 6/4 - 1.  No evidence of epidural collection. 2.  Lumbar degenerative changes. At L4-L5, there is severe spinal canal narrowing. 3.  T2 and STIR hyperintense lesions are noted in the L3 and L5 vertebral bodies, which are nonspecific but could represent atypical hemangiomas.    CT CHEST 6/6 - Moderate left pleural effusion with complete collapse left lower lobe and partial collapse lingula.    CXR 6/7 - Moderate left pleural effusion with chest tube in place and no pneumothorax. Small right pleural effusion, better characterized on CT.    CXR 6/8 - LEFT chest tube in place. Clearing of LEFT basilar airspace disease and effusion..  ----------------------------------------------------------------------------------------  PHYSICAL EXAM  Constitutional - NAD, Comfortable  Extremities - No edema  Neurologic Exam -                    Cognitive - AAO to self, place, date, year, situation     Motor -                      LEFT    UE - ShAB 4/5, EF 5/5, EE 5/5, WE 5/5,  5/5                    RIGHT UE - ShAB 4/5, EF 5/5, EE 5/5, WE 5/5,  5/5                    LEFT    LE - HF 3/5, KE 4/5, DF 2/5                     RIGHT LE - HF 4/5, KE 4/5, DF 4/5      Sensory - Intact to LT  Psychiatric - Mood depressed  ----------------------------------------------------------------------------------------  ASSESSMENT/PLAN  87yMale with functional deficits after developing BLE weakness  Left LE DVT extension - Lovenox  CAD - Plavix, Lipitor  CHF - Demadex  Pleural Effusion - Chest Tube x1  HTN - Imdur, Procardia  Pain - Tylenol, Cymbalta  DVT PPX - SCDs,   Rehab - Patient with debility and currently mobilizing mod assist x2. Family wants [facility]. [facility] administration approval for readmission from 2 months ago. Patient has managed insurance and will need prior auth. If patient is ultimately denied, daughter states she cannot take patient home. May need LYNNE. Patient is still medically being optimized, has a chest tube. May in the interim improve functionally for possible dispo HOME. Otherwise, will need LYNNE.     Will continue to follow. Functional progress will determine ongoing rehab dispo recommendations, which may change.    Continue bedside therapy as well as OOB throughout the day with mobilization by staff to maintain cardiopulmonary function and prevention of secondary complications related to debility.     Discussed with rehab team.

## 2021-06-09 NOTE — PROGRESS NOTE ADULT - SUBJECTIVE AND OBJECTIVE BOX
cc: cva , dvt , elevated trops     inetrval hx; patient seen and eval. patient awake , alert x 3 , comfortable. in no acute distress. no fever or chills.     Vital Signs Last 24 Hrs  T(C): 36.6 (09 Jun 2021 09:52), Max: 36.9 (09 Jun 2021 05:43)  T(F): 97.8 (09 Jun 2021 09:52), Max: 98.5 (09 Jun 2021 05:43)  HR: 75 (09 Jun 2021 09:52) (68 - 80)  BP: 150/56 (09 Jun 2021 09:52) (145/58 - 174/71)  BP(mean): 86 (08 Jun 2021 20:14) (86 - 86)  RR: 18 (09 Jun 2021 09:52) (16 - 18)  SpO2: 95% (09 Jun 2021 09:52) (94% - 98%)    PHYSICAL EXAM:  GEN: NAD, comfortable  HEENT: NC/AT,  PERRLA, MMM, clear conjunctiva and sclera, normal hearing   CV: S1S2, RRR, no mumur  RESP: decreased breath sounds to LLL base , left pleurex cath in place   ABD: +BS, soft, ND, NT, no guarding, no rigidity, no HSM  EXT: +2 radial and pedal pulses, b/l LE edema, L>R  NEURO: no neuro deficits, moves all extremities, follows all commands  PSYCH: normal behavior                             9.3    8.01  )-----------( 246      ( 09 Jun 2021 06:01 )             31.7   06-09    140  |  100  |  27.0<H>  ----------------------------<  91  4.7   |  27.0  |  3.80<H>    Ca    8.7      09 Jun 2021 06:01  Phos  4.2     06-08  Mg     1.8     06-08    TPro  6.1<L>  /  Alb  2.6<L>  /  TBili  0.5  /  DBili  x   /  AST  188<H>  /  ALT  154<H>  /  AlkPhos  97  06-08                          IMAGING:     < from: CT Head No Cont (06.05.21 @ 08:20) >  IMPRESSION:  No evidence of acute intracranial abnormality.  No evidence of hemorrhage.  Chronic changes as above.  < end of copied text >    RADIOLOGY:  < from: CT Brain Stroke Protocol (06.02.21 @ 12:59) >  FINDINGS:    There is no acute intracranial hemorrhage or mass effect. There are areas of hypodensity in the bilateral hemispheric white matter suggesting chronic white matter microvascular ischemic change. There is cerebral volume loss. A calcification in the left parietal lobe is again seen.    There is no extraaxial fluid collection.    Intracranial atherosclerotic calcifications.    There is no displaced calvarial fracture. The visualized orbits are within normal limits. The visualized portions of the paranasal sinuses are well aerated. The mastoid air cells are well aerated.      IMPRESSION:    No evidence of intracranial hemorrhage or mass effect. If clinical suspicion for acute infarct persists, brain MRI can be obtained if there is no contraindication.    < from: MR Head No Cont (06.03.21 @ 13:53) >    There is no evidence of acute infarct. Chronic right pontine lacunar infarct. A few small foci of susceptibility artifact in the bilateral cerebral hemispheres suggesting chronic microhemorrhages. Scattered foci of T2/FLAIR hyperintensity in the bilateral hemispheric white matter are nonspecific but likely related to chronic white matter microvascular ischemic disease. There is cerebral volume loss. Trace bilateral frontal subdural collections suggesting subdural hematomas or subdural hygromas.    Flow voids of the major intracranial vessels at the skull base follow expected course and contour.    The paranasal sinuses demonstrate no signal abnormality. The mastoids demonstrate no signal abnormality.  Bilateral orbits are within normal limits.      IMPRESSION:  1.  No acute infarct.  2.  Trace bilateral frontal subdural collections suggesting subdural hematomas or subdural hygromas.    < from: US Duplex Venous Lower Ext Complete, Bilateral (06.03.21 @ 14:52) >  FINDINGS:    RIGHT:  Normal compressibility of the RIGHT common femoral, femoral and popliteal veins.  Doppler examination shows normal spontaneous and phasic flow.  No RIGHT calf vein thrombosis is detected.    LEFT:  There is no evidence of thrombus within the LEFT femoral vein as well as popliteal vein. Flow is seen in the posterior tibial vein. The peroneal vein is not visualized.    IMPRESSION:  New extension of LEFT lower extremity thrombus into the LEFT femoral vein.    < from: MR Lumbar Spine No Cont (06.04.21 @ 11:23) >  Thoracic spine:  1.  No evidence of epidural collection.  2.  There are T1, T2, and STIR hyperintense lesions in the T10 vertebral body and left T3 pedicle. These are nonspecific but could represent atypical hemangiomas.  3.  Large left pleural effusion. Consider CT chest.    Lumbar spine:  1.  No evidence of epidural collection.  2.  Lumbar degenerative changes. At L4-L5, there is severe spinal canal narrowing.  3.  T2 and STIR hyperin

## 2021-06-09 NOTE — PROGRESS NOTE ADULT - SUBJECTIVE AND OBJECTIVE BOX
[facility] DIVISION OF KIDNEY DISEASES AND HYPERTENSION -- FOLLOW UP NOTE  --------------------------------------------------------------------------------  Chief Complaint:  ESRD HD    24 hour events/subjective:  Seen/examined this AM  HD twice weekly, MF; due Friday;      PAST HISTORY  --------------------------------------------------------------------------------  No significant changes to PMH, PSH, FHx, SHx, unless otherwise noted    ALLERGIES & MEDICATIONS  --------------------------------------------------------------------------------  Allergies    Plavix (Hives)  Toprol-XL (Rash)    Intolerances      Standing Inpatient Medications  atorvastatin 80 milliGRAM(s) Oral at bedtime  DULoxetine 60 milliGRAM(s) Oral daily  enoxaparin Injectable 65 milliGRAM(s) SubCutaneous daily  epoetin juan-epbx (RETACRIT) Injectable 69560 Unit(s) IV Push <User Schedule>  epoetin juan-epbx (RETACRIT) Injectable 67956 Unit(s) SubCutaneous <User Schedule>  isosorbide   mononitrate ER Tablet (IMDUR) 30 milliGRAM(s) Oral daily  NIFEdipine XL 90 milliGRAM(s) Oral daily  NIFEdipine XL 60 milliGRAM(s) Oral at bedtime  pantoprazole    Tablet 40 milliGRAM(s) Oral two times a day  tamsulosin 0.4 milliGRAM(s) Oral at bedtime  torsemide 20 milliGRAM(s) Oral <User Schedule>    PRN Inpatient Medications      REVIEW OF SYSTEMS  --------------------------------------------------------------------------------  Gen: No weight changes, fatigue, fevers/chills, weakness  Skin: No rashes  Head/Eyes/Ears/Mouth: No headache  Respiratory: No dyspnea, cough,  CV: No chest pain, orthopnea  GI: No abdominal pain, diarrhea, constipation, nausea, vomiting,  MSK: No joint pain  Neuro: No dizziness/lightheadedness, weakness  Heme: No bleeding  Psych: No significant nervousness, anxiety, stress, depression    All other systems were reviewed and are negative, except as noted.      VITALS/PHYSICAL EXAM  --------------------------------------------------------------------------------  T(C): 36.6 (06-09-21 @ 09:52), Max: 36.9 (06-09-21 @ 05:43)  HR: 75 (06-09-21 @ 09:52) (68 - 80)  BP: 150/56 (06-09-21 @ 09:52) (145/58 - 174/71)  RR: 18 (06-09-21 @ 09:52) (16 - 18)  SpO2: 95% (06-09-21 @ 09:52) (94% - 98%)  Wt(kg): --        06-08-21 @ 07:01  -  06-09-21 @ 07:00  --------------------------------------------------------  IN: 370 mL / OUT: 420 mL / NET: -50 mL    06-09-21 @ 07:01  -  06-09-21 @ 12:01  --------------------------------------------------------  IN: 0 mL / OUT: 100 mL / NET: -100 mL      Physical Exam:  	Gen: NAD  	HEENT: PERRL, supple neck,  	Pulm: left chest tube+  	CV: RRR, S1S2; no rub  	Abd: +BS, soft, nontender  	UE: Warm, intact strength; no asterixis  	LE: Warm, left leg wrapped in ACE bandage  	Neuro: No focal deficits  	Psych: Normal affect and mood  	Skin: Warm, without rashes  	Vascular access: RANDALL TONEY    LABS/STUDIES  --------------------------------------------------------------------------------              9.3    8.01  >-----------<  246      [06-09-21 @ 06:01]              31.7     140  |  100  |  27.0  ----------------------------<  91      [06-09-21 @ 06:01]  4.7   |  27.0  |  3.80        Ca     8.7     [06-09-21 @ 06:01]      Mg     1.8     [06-08-21 @ 08:42]      Phos  4.2     [06-08-21 @ 08:42]    TPro  6.1  /  Alb  2.6  /  TBili  0.5  /  DBili  x   /  AST  188  /  ALT  154  /  AlkPhos  97  [06-08-21 @ 08:42]      PTT: 170.5      [06-08-21 @ 17:33]      Creatinine Trend:  SCr 3.80 [06-09 @ 06:01]  SCr 2.95 [06-08 @ 08:42]  SCr 4.65 [06-07 @ 05:28]  SCr 3.99 [06-06 @ 06:06]  SCr 3.39 [06-05 @ 06:20]    Urinalysis - [06-04-21 @ 07:54]      Color Yellow / Appearance Clear / SG 1.010 / pH 8.0      Gluc Negative / Ketone Negative  / Bili Negative / Urobili Negative       Blood Large / Protein 100 / Leuk Est Negative / Nitrite Negative      RBC 3-5 / WBC 3-5 / Hyaline  / Gran  / Sq Epi  / Non Sq Epi Negative / Bacteria Negative      Iron 53, TIBC 266, %sat 20      [08-19-20 @ 06:32]  Ferritin 184      [08-19-20 @ 06:32]  PTH -- (Ca 9.6)      [08-19-20 @ 16:08]   91  Vitamin D (25OH) 26.4      [08-19-20 @ 16:08]  HbA1c 4.9      [08-09-18 @ 05:54]  TSH 2.16      [09-15-20 @ 02:01]  Lipid: chol 126, , HDL 60, LDL --      [06-03-21 @ 06:07]

## 2021-06-09 NOTE — PROGRESS NOTE ADULT - SUBJECTIVE AND OBJECTIVE BOX
[facility]                                        [facility] [place]                                 [address]                                  [address]                                        [place], [place], [address]                                             [phone]        CC: Gait difficulty    HPI:  The patient is a 87y Male who presented with difficulty walking for 3 days.  He has history of multiple surgeries for PAD on his lower extremities, in March he had left fem-pop bypass complicated by DVT and went to rehab.  He was able to walk about 15-20 feet with a walker until it was too painful and he had to stop.  For the past three days he has noticed worsening b/l foot numbness with difficulty walking and dragging his feet.    Interim history: still feels leg dragging when walking    Review of systems (neurology): Denies headache or dizziness. Denies weakness, (+) b/l feet numbness.  Denies speech/language deficits. Denies diplopia/blurred vision.  Denies confusion (+) difficulty walking    MEDICATIONS  (STANDING):  atorvastatin 80 milliGRAM(s) Oral at bedtime  DULoxetine 60 milliGRAM(s) Oral daily  enoxaparin Injectable 65 milliGRAM(s) SubCutaneous daily  epoetin juan-epbx (RETACRIT) Injectable 48133 Unit(s) IV Push <User Schedule>  epoetin juan-epbx (RETACRIT) Injectable 67211 Unit(s) SubCutaneous <User Schedule>  isosorbide   mononitrate ER Tablet (IMDUR) 30 milliGRAM(s) Oral daily  NIFEdipine XL 90 milliGRAM(s) Oral daily  NIFEdipine XL 60 milliGRAM(s) Oral at bedtime  pantoprazole    Tablet 40 milliGRAM(s) Oral two times a day  tamsulosin 0.4 milliGRAM(s) Oral at bedtime  torsemide 20 milliGRAM(s) Oral <User Schedule>    MEDICATIONS  (PRN):      Vital Signs Last 24 Hrs  T(C): 36.6 (09 Jun 2021 09:52), Max: 36.9 (09 Jun 2021 05:43)  T(F): 97.8 (09 Jun 2021 09:52), Max: 98.5 (09 Jun 2021 05:43)  HR: 75 (09 Jun 2021 09:52) (68 - 80)  BP: 150/56 (09 Jun 2021 09:52) (145/58 - 174/71)  BP(mean): 86 (08 Jun 2021 20:14) (86 - 86)  RR: 18 (09 Jun 2021 09:52) (16 - 18)  SpO2: 95% (09 Jun 2021 09:52) (94% - 98%)    Detailed Neurologic Exam:    Mental status: The patient is awake and alert. There is no aphasia. There is no dysarthria.     Cranial nerves: Pupils equal and react symmetrically to light. There is no visual field deficit to confrontation. Extraocular motion is full with no nystagmus. There is no ptosis. Facial sensation is intact. Facial musculature is symmetric. Palate elevates symmetrically.  Tongue is midline.    Motor: There is normal bulk and tone.  There is no tremor.  Strength is 5/5 in the right arm and 4+/5leg.   Strength is 5/5 in the left arm and 4-/5 leg.    Sensation: Intact to light touch and pin in 4 extremities, except for decreased at both feet    Reflexes: 1+ throughout and plantar responses are equivocal.    Cerebellar: There is no dysmetria on finger to nose testing.    Labs:                           9.3    8.01  )-----------( 246      ( 09 Jun 2021 06:01 )             31.7     06-09    140  |  100  |  27.0<H>  ----------------------------<  91  4.7   |  27.0  |  3.80<H>    Ca    8.7      09 Jun 2021 06:01  Phos  4.2     06-08  Mg     1.8     06-08    TPro  6.1<L>  /  Alb  2.6<L>  /  TBili  0.5  /  DBili  x   /  AST  188<H>  /  ALT  154<H>  /  AlkPhos  97  06-08    LIVER FUNCTIONS - ( 08 Jun 2021 08:42 )  Alb: 2.6 g/dL / Pro: 6.1 g/dL / ALK PHOS: 97 U/L / ALT: 154 U/L / AST: 188 U/L / GGT: x           PTT - ( 08 Jun 2021 17:33 )  PTT:170.5 sec

## 2021-06-10 ENCOUNTER — APPOINTMENT (OUTPATIENT)
Age: 86
End: 2021-06-10

## 2021-06-10 LAB
ANION GAP SERPL CALC-SCNC: 13 MMOL/L — SIGNIFICANT CHANGE UP (ref 5–17)
BUN SERPL-MCNC: 34.7 MG/DL — HIGH (ref 8–20)
CALCIUM SERPL-MCNC: 9.1 MG/DL — SIGNIFICANT CHANGE UP (ref 8.6–10.2)
CHLORIDE SERPL-SCNC: 99 MMOL/L — SIGNIFICANT CHANGE UP (ref 98–107)
CO2 SERPL-SCNC: 27 MMOL/L — SIGNIFICANT CHANGE UP (ref 22–29)
CREAT SERPL-MCNC: 4.55 MG/DL — HIGH (ref 0.5–1.3)
GLUCOSE SERPL-MCNC: 84 MG/DL — SIGNIFICANT CHANGE UP (ref 70–99)
HCT VFR BLD CALC: 33.5 % — LOW (ref 39–50)
HGB BLD-MCNC: 10.1 G/DL — LOW (ref 13–17)
MCHC RBC-ENTMCNC: 29.4 PG — SIGNIFICANT CHANGE UP (ref 27–34)
MCHC RBC-ENTMCNC: 30.1 GM/DL — LOW (ref 32–36)
MCV RBC AUTO: 97.7 FL — SIGNIFICANT CHANGE UP (ref 80–100)
PLATELET # BLD AUTO: 262 K/UL — SIGNIFICANT CHANGE UP (ref 150–400)
POTASSIUM SERPL-MCNC: 5 MMOL/L — SIGNIFICANT CHANGE UP (ref 3.5–5.3)
POTASSIUM SERPL-SCNC: 5 MMOL/L — SIGNIFICANT CHANGE UP (ref 3.5–5.3)
RBC # BLD: 3.43 M/UL — LOW (ref 4.2–5.8)
RBC # FLD: 19.5 % — HIGH (ref 10.3–14.5)
SODIUM SERPL-SCNC: 139 MMOL/L — SIGNIFICANT CHANGE UP (ref 135–145)
WBC # BLD: 8.38 K/UL — SIGNIFICANT CHANGE UP (ref 3.8–10.5)
WBC # FLD AUTO: 8.38 K/UL — SIGNIFICANT CHANGE UP (ref 3.8–10.5)

## 2021-06-10 PROCEDURE — 99231 SBSQ HOSP IP/OBS SF/LOW 25: CPT

## 2021-06-10 PROCEDURE — 99233 SBSQ HOSP IP/OBS HIGH 50: CPT

## 2021-06-10 PROCEDURE — 71045 X-RAY EXAM CHEST 1 VIEW: CPT | Mod: 26

## 2021-06-10 RX ADMIN — ENOXAPARIN SODIUM 65 MILLIGRAM(S): 100 INJECTION SUBCUTANEOUS at 22:25

## 2021-06-10 RX ADMIN — DULOXETINE HYDROCHLORIDE 60 MILLIGRAM(S): 30 CAPSULE, DELAYED RELEASE ORAL at 09:04

## 2021-06-10 RX ADMIN — ISOSORBIDE MONONITRATE 30 MILLIGRAM(S): 60 TABLET, EXTENDED RELEASE ORAL at 09:04

## 2021-06-10 RX ADMIN — ATORVASTATIN CALCIUM 80 MILLIGRAM(S): 80 TABLET, FILM COATED ORAL at 22:25

## 2021-06-10 RX ADMIN — Medication 90 MILLIGRAM(S): at 05:33

## 2021-06-10 RX ADMIN — TAMSULOSIN HYDROCHLORIDE 0.4 MILLIGRAM(S): 0.4 CAPSULE ORAL at 22:25

## 2021-06-10 RX ADMIN — Medication 20 MILLIGRAM(S): at 09:04

## 2021-06-10 RX ADMIN — Medication 60 MILLIGRAM(S): at 22:25

## 2021-06-10 RX ADMIN — PANTOPRAZOLE SODIUM 40 MILLIGRAM(S): 20 TABLET, DELAYED RELEASE ORAL at 05:33

## 2021-06-10 RX ADMIN — PANTOPRAZOLE SODIUM 40 MILLIGRAM(S): 20 TABLET, DELAYED RELEASE ORAL at 17:10

## 2021-06-10 NOTE — PROGRESS NOTE ADULT - ASSESSMENT
87 y/o M with PMHx of HTN, HLD, CAD, HFpEF, s/p Right CEA for Carotid artery disease, ESRD on HD 2d/week (M/Fri) via LUE fistula, s/p flecainide w/ ILR placed 6/2020, AS s/p TAVR, and PAD (RLE fem-pop bypass October 2020) multiple gastric AVM's s/p cauterized s/p LLE fem-pop bypass, and had the bypass performed on 3/20/21, post procedure developed L femoral DVT and s/p revision of LLE bypass 3/25/21 brought to ED with cc of b/l LE ext weakness with difficulty to ambulate for past 2 days. In addition, per daughter pt has slurred speech since yesterday. In ED CT head negative however has elevated trop. Pt Denies any sob, no chest pain, no n/v or abd pain. Per pt has lower extremity weakness and cant walk. (02 Jun 2021 14:43)    # B/l LE weakness/ -Symptoms likely secondary to progressing/extensive DVT (Duplex: New extension of LEFT lower extremity thrombus into the LEFT femoral vein)  -Acute CVA ruled  out  -CT head negative for acute pathology  -MRI brain: Trace bilateral frontal subdural collections suggesting subdural hematomas or subdural hygromas.  -Seen in consult by neuro and neurosurgical teams  -MRI C/T/L spine ordered by neurosurgery, no acute actionable findings   -Continue Lipitor 80mg po qhs   - dc  Plavix as per  vascular team    -changed to renal dosed lovenox     #Left large pleural effusion  -Incidental finding on MRI spine imaging. Pt is currently asymptomatic, not short of breath and stable on room air   -CT Chest ordered for further eval: Moderate left pleural effusion with complete collapse left lower lobe and partial collapse lingula.  -Unclear etiology, possibly secondary to fluid shifts/oncotic pressures  -s/p drainage 6/7   -Follow pleural fluid analysis     #Left Lower Extremity DVT  -B/L LE edema, L>R  -Venous duplex b/l LE: New extension of LLE thrombus into L femoral vein  -MRI head with subdural collection suggestive of subdural hematoma or hygroma  -MRI spine with no acute actionable findings  -Vascular surgery consult appreciated: no IVC filter recommended at this time   -discussed with dr. navarro. changed to 1mg / kg qd dose due to renal failure  ,   -dc plavix     #Elevated Troponin levels in setting of ESRD  -Asymptomatic   -EKG: NSR, rate 82/min, first degree AV block   -TTE w/o WMA  -Continue telemetry monitoring   -Cardiology consult appreciated, no further recs/work up     #Generalized weakness, leg weakness     -Likely due to LLE edema that is prominent  -PT eval appreciated: home w/ assist; home w/ home PT vs [facility]  -  family wishing acute rehab       #PVD/HLD/HTN  -c/w statin, nifedipine Imdur  - dcd plavix     #ESRD on HD  -Continue HD  -Nephrology following     #Depression  -Continue duloxetine    #Hx of GI bleed due to gastric AVMs, follows with dr. barrera as op   -Monitor h/h   -Continue PPI    DISPO: acute rehab vs [facility] , sw/cm for dc planning

## 2021-06-10 NOTE — PROGRESS NOTE ADULT - ASSESSMENT
ESRD on HD  Left moderate pleural effusion        s/p thoracentesis  LLE DVT- on heparin drip  Anemia of CKD    Continue HD MF schedule    HD in AM,    Awaits - Placement in [facility],

## 2021-06-10 NOTE — PROGRESS NOTE ADULT - SUBJECTIVE AND OBJECTIVE BOX
Formerly [facility], [facility]                              [address] [place] [address]                                                 PHONE: [phone]                                                 FAX: [phone]  -----------------------------------------------------------------------------------------------  Pt seen and examined. FU for  shortness of breath    Overnight events/Complaints: Pt in bed. Appears fatigued.    Vital Signs Last 24 Hrs  T(C): 36.9 (10 Rolly 2021 05:32), Max: 36.9 (10 Rolly 2021 05:32)  T(F): 98.4 (10 Rolly 2021 05:32), Max: 98.4 (10 Rolly 2021 05:32)  HR: 68 (10 Rolly 2021 05:32) (68 - 69)  BP: 146/62 (10 Rolly 2021 05:32) (146/62 - 161/67)  BP(mean): --  RR: 18 (10 Rolly 2021 05:32) (18 - 18)  SpO2: 94% (10 Rolly 2021 05:32) (92% - 94%)  I&O's Summary    09 Jun 2021 07:01  -  10 Rolly 2021 07:00  --------------------------------------------------------  IN: 200 mL / OUT: 230 mL / NET: -30 mL    10 Rolly 2021 07:01  -  10 Rolly 2021 11:04  --------------------------------------------------------  IN: 0 mL / OUT: 20 mL / NET: -20 mL      PHYSICAL EXAM:  General: Elderly male in bed  HEENT: Head; normocephalic, atraumatic. Pupils reactive, cornea wnl. Neck supple, no nodes adenopathy, (+) JVD  CARDIOVASCULAR: Normal S1 and S2, 1/6 DEANA, no rub, gallop or lift.   LUNGS: No rales, rhonchi or wheeze. decreased breath sounds at bilateral bases   ABDOMEN: Soft, nontender without mass or organomegaly. bowel sounds normoactive.  EXTREMITIES: No clubbing, cyanosis; (+)  L>R edema.   SKIN: warm and dry with normal turgor.  NEURO: Alert/oriented x 3/normal motor exam.   PSYCH: normal affect.        LABS:                        10.1   8.38  )-----------( 262      ( 10 Rolly 2021 06:22 )             33.5     06-10    139  |  99  |  34.7<H>  ----------------------------<  84  5.0   |  27.0  |  4.55<H>    Ca    9.1      10 Rolly 2021 06:22    PTT - ( 08 Jun 2021 17:33 )  PTT:170.5 sec    RADIOLOGY & ADDITIONAL STUDIES: (reviewed)  CXR was independently visualized/reviewed  and demonstrated: L chest tube    CARDIOLOGY TESTING:(reviewed)     12 lead EKG independently visualized/reviewed  and demonstrated     ECHOCARDIOGRAM independently visualized/reviewed and demonstrated :    1. There is moderate concentric left ventricular hypertrophy.   2. Normal global left ventricular systolic function.   3. Left ventricular ejection fraction, by visual estimation, is 60 to 65%.   4. Normal right ventricular size and function.   5. Severely enlarged left atrium.   6. Moderately enlarged right atrium.   7. Bioprosthesis in the aortic position with normal gradients.   8. Moderate mitral valve regurgitation.   9. Moderate mitral annular calcification.  10. Moderate tricuspid regurgitation.  11. Estimated pulmonary artery systolic pressure is 80.6 mmHg assuming a right atrial pressure of 8 mmHg, which is consistent with severe pulmonary hypertension.  12. Moderate mitral stenosis, increased gradient partly due to tachycardia.    TELEMETRY independently visualized/reviewed and demonstrated : AF    MEDICATIONS:(reviewed)  MEDICATIONS  (STANDING):  atorvastatin 80 milliGRAM(s) Oral at bedtime  DULoxetine 60 milliGRAM(s) Oral daily  enoxaparin Injectable 65 milliGRAM(s) SubCutaneous daily  epoetin juna-epbx (RETACRIT) Injectable 93604 Unit(s) IV Push <User Schedule>  epoetin juan-epbx (RETACRIT) Injectable 09122 Unit(s) SubCutaneous <User Schedule>  isosorbide   mononitrate ER Tablet (IMDUR) 30 milliGRAM(s) Oral daily  NIFEdipine XL 90 milliGRAM(s) Oral daily  NIFEdipine XL 60 milliGRAM(s) Oral at bedtime  pantoprazole    Tablet 40 milliGRAM(s) Oral two times a day  tamsulosin 0.4 milliGRAM(s) Oral at bedtime  torsemide 20 milliGRAM(s) Oral <User Schedule>    ASSESSMENT AND PLAN:    87y Male with past medical history significant for PAF off AC due to GIBs, leadless PPM, ILR in place, Non-obst CAD with normal EF, AS s/p TAVR, ESRD on HD, min trop elevation likely non MI in setting of ESRD with progressive functional decline found to have DVT.  MRI without acute pathology     Continue Plavix and on lovenox  Chest tube in place, management per thoracic surgery.   Torsemide on non HD days [Pt gets HD on Monday and Friday as per pt]

## 2021-06-10 NOTE — PROGRESS NOTE ADULT - SUBJECTIVE AND OBJECTIVE BOX
Pt seen and examined. FU for  shortness of breath, ESRD - HD,     Overnight events/Complaints: Pt in bed. Appears fatigued.    Vital Signs Last 24 Hrs  T(C): 36.9 (10 Rolly 2021 05:32), Max: 36.9 (10 Rolly 2021 05:32)  T(F): 98.4 (10 Rolly 2021 05:32), Max: 98.4 (10 Rolly 2021 05:32)  HR: 68 (10 Rolly 2021 05:32) (68 - 69)  BP: 146/62 (10 Rolly 2021 05:32) (146/62 - 161/67)  RR: 18 (10 Rolly 2021 05:32) (18 - 18)  SpO2: 94% (10 Rolly 2021 05:32) (92% - 94%)  I&O's Summary    09 Jun 2021 07:01  -  10 Rolly 2021 07:00  --------------------------------------------------------  IN: 200 mL / OUT: 230 mL / NET: -30 mL    10 Rolly 2021 07:01  -  10 Rolly 2021 11:04  --------------------------------------------------------  IN: 0 mL / OUT: 20 mL / NET: -20 mL    PHYSICAL EXAM:  General: Elderly male in bed, Pale , Sallow,   HEENT: Head; normocephalic, atraumatic. Pupils reactive, cornea wnl. Neck supple, no nodes adenopathy, (+) JVD  CARDIOVASCULAR: Normal S1 and S2, 1/6 DEANA, no rub, gallop or lift.   LUNGS: No rales, rhonchi or wheeze,  decreased  breath sounds at bilateral bases   ABDOMEN: Soft, nontender without mass or organomegaly. bowel sounds normoactive.  EXTREMITIES: No clubbing, cyanosis; (+)  L>R edema.   SKIN: warm and dry with normal turgor.  NEURO: Alert/oriented x 3/normal motor exam.   PSYCH: Depressed,         LABS:                        10.1   8.38  )-----------( 262      ( 10 Rolly 2021 06:22 )             33.5     06-10    139  |  99  |  34.7<H>  ----------------------------<  84  5.0   |  27.0  |  4.55<H>    Ca    9.1      10 Rolly 2021 06:22    PTT - ( 08 Jun 2021 17:33 )  PTT:170.5 sec    RADIOLOGY & ADDITIONAL STUDIES: (reviewed)  CXR was independently visualized/reviewed  and demonstrated: L chest tube    CARDIOLOGY TESTING:(reviewed)     12 lead EKG independently visualized/reviewed  and demonstrated     ECHOCARDIOGRAM independently visualized/reviewed and demonstrated :    1. There is moderate concentric left ventricular hypertrophy.   2. Normal global left ventricular systolic function.   3. Left ventricular ejection fraction, by visual estimation, is 60 to 65%.   4. Normal right ventricular size and function.   5. Severely enlarged left atrium.   6. Moderately enlarged right atrium.   7. Bioprosthesis in the aortic position with normal gradients.   8. Moderate mitral valve regurgitation.   9. Moderate mitral annular calcification.  10. Moderate tricuspid regurgitation.  11. Estimated pulmonary artery systolic pressure is 80.6 mmHg assuming a right atrial pressure of 8 mmHg, which is consistent with severe pulmonary hypertension.  12. Moderate mitral stenosis, increased gradient partly due to tachycardia.    TELEMETRY independently visualized/reviewed and demonstrated : AF    MEDICATIONS:(reviewed)  MEDICATIONS  (STANDING):  atorvastatin 80 milliGRAM(s) Oral at bedtime  DULoxetine 60 milliGRAM(s) Oral daily  enoxaparin Injectable 65 milliGRAM(s) SubCutaneous daily  epoetin juan-epbx (RETACRIT) Injectable 37812 Unit(s) IV Push <User Schedule>  epoetin juan-epbx (RETACRIT) Injectable 10787 Unit(s) SubCutaneous <User Schedule>  isosorbide   mononitrate ER Tablet (IMDUR) 30 milliGRAM(s) Oral daily  NIFEdipine XL 90 milliGRAM(s) Oral daily  NIFEdipine XL 60 milliGRAM(s) Oral at bedtime  pantoprazole    Tablet 40 milliGRAM(s) Oral two times a day  tamsulosin 0.4 milliGRAM(s) Oral at bedtime  torsemide 20 milliGRAM(s) Oral <User Schedule>    ASSESSMENT AND PLAN:    87y Male with past medical history significant for PAF off AC due to GIBs, leadless PPM, ILR in place, Non-obst CAD with normal EF, AS s/p TAVR, ESRD on HD, min trop elevation likely non MI in setting of ESRD with progressive functional decline found to have DVT.    MRI without acute pathology     Continue Plavix and on Lovenox  Chest tube in place, management per thoracic surgery.   Torsemide on non HD days [[facility]]

## 2021-06-10 NOTE — PROGRESS NOTE ADULT - SUBJECTIVE AND OBJECTIVE BOX
cc: cva , dvt , elevated trops     inetrval hx; patient seen and eval. patient awake , alert x 3 , comfortable. in no acute distress. no fever or chills. pleural fluid mildly pink tinged     Vital Signs Last 24 Hrs  T(C): 36.4 (10 Rolly 2021 15:35), Max: 36.9 (10 Rolly 2021 05:32)  T(F): 97.6 (10 Rolly 2021 15:35), Max: 98.4 (10 Rolly 2021 05:32)  HR: 74 (10 Rolly 2021 15:35) (68 - 84)  BP: 146/61 (10 Rolly 2021 15:35) (146/61 - 166/65)  BP(mean): --  RR: 18 (10 Rolly 2021 10:00) (18 - 18)  SpO2: 95% (10 Rolly 2021 15:35) (92% - 95%)      PHYSICAL EXAM:  GEN: NAD, comfortable  HEENT: NC/AT,  PERRLA, MMM, clear conjunctiva and sclera, normal hearing   CV: S1S2, RRR, no mumur  RESP: decreased breath sounds to LLL base , left pleurex cath in place   ABD: +BS, soft, ND, NT, no guarding, no rigidity, no HSM  EXT: +2 radial and pedal pulses, b/l LE edema, L>R  NEURO: no neuro deficits, moves all extremities, follows all commands  PSYCH: normal behavior                                        10.1   8.38  )-----------( 262      ( 10 Rolly 2021 06:22 )             33.5   06-10    139  |  99  |  34.7<H>  ----------------------------<  84  5.0   |  27.0  |  4.55<H>    Ca    9.1      10 Rolly 2021 06:22                           IMAGING:     < from: CT Head No Cont (06.05.21 @ 08:20) >  IMPRESSION:  No evidence of acute intracranial abnormality.  No evidence of hemorrhage.  Chronic changes as above.  < end of copied text >    RADIOLOGY:  < from: CT Brain Stroke Protocol (06.02.21 @ 12:59) >  FINDINGS:    There is no acute intracranial hemorrhage or mass effect. There are areas of hypodensity in the bilateral hemispheric white matter suggesting chronic white matter microvascular ischemic change. There is cerebral volume loss. A calcification in the left parietal lobe is again seen.    There is no extraaxial fluid collection.    Intracranial atherosclerotic calcifications.    There is no displaced calvarial fracture. The visualized orbits are within normal limits. The visualized portions of the paranasal sinuses are well aerated. The mastoid air cells are well aerated.      IMPRESSION:    No evidence of intracranial hemorrhage or mass effect. If clinical suspicion for acute infarct persists, brain MRI can be obtained if there is no contraindication.    < from: MR Head No Cont (06.03.21 @ 13:53) >    There is no evidence of acute infarct. Chronic right pontine lacunar infarct. A few small foci of susceptibility artifact in the bilateral cerebral hemispheres suggesting chronic microhemorrhages. Scattered foci of T2/FLAIR hyperintensity in the bilateral hemispheric white matter are nonspecific but likely related to chronic white matter microvascular ischemic disease. There is cerebral volume loss. Trace bilateral frontal subdural collections suggesting subdural hematomas or subdural hygromas.    Flow voids of the major intracranial vessels at the skull base follow expected course and contour.    The paranasal sinuses demonstrate no signal abnormality. The mastoids demonstrate no signal abnormality.  Bilateral orbits are within normal limits.      IMPRESSION:  1.  No acute infarct.  2.  Trace bilateral frontal subdural collections suggesting subdural hematomas or subdural hygromas.    < from: US Duplex Venous Lower Ext Complete, Bilateral (06.03.21 @ 14:52) >  FINDINGS:    RIGHT:  Normal compressibility of the RIGHT common femoral, femoral and popliteal veins.  Doppler examination shows normal spontaneous and phasic flow.  No RIGHT calf vein thrombosis is detected.    LEFT:  There is no evidence of thrombus within the LEFT femoral vein as well as popliteal vein. Flow is seen in the posterior tibial vein. The peroneal vein is not visualized.    IMPRESSION:  New extension of LEFT lower extremity thrombus into the LEFT femoral vein.    < from: MR Lumbar Spine No Cont (06.04.21 @ 11:23) >  Thoracic spine:  1.  No evidence of epidural collection.  2.  There are T1, T2, and STIR hyperintense lesions in the T10 vertebral body and left T3 pedicle. These are nonspecific but could represent atypical hemangiomas.  3.  Large left pleural effusion. Consider CT chest.    Lumbar spine:  1.  No evidence of epidural collection.  2.  Lumbar degenerative changes. At L4-L5, there is severe spinal canal narrowing.  3.  T2 and STIR hyperin

## 2021-06-10 NOTE — PROGRESS NOTE ADULT - SUBJECTIVE AND OBJECTIVE BOX
Patient fatigued.  Reports pain in his back at site of chest tube.  Discussed with daughter about AR plan.  Wants to attempt auth for AR.   Rehab liaison made aware. Will discuss with GC administration.    REVIEW OF SYSTEMS  Constitutional - No fever,  No fatigue  HEENT - No vertigo, No neck pain  Neurological - No headaches, No memory loss, No loss of strength, No numbness, No tremors  Musculoskeletal - No joint pain, No joint swelling, No muscle pain  Psychiatric - No depression, No anxiety    FUNCTIONAL PROGRESS  6/9  Sit-Stand Transfer Training  Transfer Training Sit-to-Stand Transfer: moderate assist (50% patient effort);  1 person assist;  verbal cues;  weight-bearing as tolerated   rolling walker  Transfer Training Stand-to-Sit Transfer: moderate assist (50% patient effort);  1 person assist;  verbal cues;  weight-bearing as tolerated   rolling walker  Sit-to-Stand Transfer Training Transfer Safety Analysis: decreased sequencing ability;  decreased weight-shifting ability;  decreased strength;  pain;  cognitive, decreased safety awareness;  rolling walker    Gait Training  Gait Training: moderate assist (50% patient effort);  1 person assist;  verbal cues;  weight-bearing as tolerated   rolling walker;  5 feet  Gait Analysis: swing-to gait   crouch;  decreased chadd;  increased time in double stance;  shuffling;  decreased step length;  decreased strength;  pain;  5 feet;  rolling walker  Gait Number of Times:: x 2  Type of Rest Type of Rest: sitting      VITALS  T(C): 36.9 (06-10-21 @ 05:32), Max: 36.9 (06-10-21 @ 05:32)  HR: 68 (06-10-21 @ 05:32) (68 - 69)  BP: 146/62 (06-10-21 @ 05:32) (146/62 - 161/67)  RR: 18 (06-10-21 @ 05:32) (18 - 18)  SpO2: 94% (06-10-21 @ 05:32) (92% - 94%)  Wt(kg): --    MEDICATIONS   atorvastatin 80 milliGRAM(s) at bedtime  DULoxetine 60 milliGRAM(s) daily  enoxaparin Injectable 65 milliGRAM(s) daily  epoetin juan-epbx (RETACRIT) Injectable 17087 Unit(s) <User Schedule>  epoetin juan-epbx (RETACRIT) Injectable 07246 Unit(s) <User Schedule>  isosorbide   mononitrate ER Tablet (IMDUR) 30 milliGRAM(s) daily  NIFEdipine XL 90 milliGRAM(s) daily  NIFEdipine XL 60 milliGRAM(s) at bedtime  pantoprazole    Tablet 40 milliGRAM(s) two times a day  tamsulosin 0.4 milliGRAM(s) at bedtime  torsemide 20 milliGRAM(s) <User Schedule>      RECENT LABS/IMAGING                          10.1   8.38  )-----------( 262      ( 10 Rolly 2021 06:22 )             33.5     06-10    139  |  99  |  34.7<H>  ----------------------------<  84  5.0   |  27.0  |  4.55<H>    Ca    9.1      10 Rolly 2021 06:22      PTT - ( 08 Jun 2021 17:33 )  PTT:170.5 sec            MRI HEAD 6/3 - 1.  No acute infarct. 2.  Trace bilateral frontal subdural collections suggesting subdural hematomas or subdural hygromas.    BLE DOPPLER 6/3 - New extension of LEFT lower extremity thrombus into the LEFT femoral vein.    MRI Thoracic spine 6/4 1.  No evidence of epidural collection. 2.  There are T1, T2, and STIR hyperintense lesions in the T10 vertebral body and left T3 pedicle. These are nonspecific but could represent atypical hemangiomas. 3.  Large left pleural effusion. Consider CT chest.    MRI Lumbar spine 6/4 - 1.  No evidence of epidural collection. 2.  Lumbar degenerative changes. At L4-L5, there is severe spinal canal narrowing. 3.  T2 and STIR hyperintense lesions are noted in the L3 and L5 vertebral bodies, which are nonspecific but could represent atypical hemangiomas.    CT CHEST 6/6 - Moderate left pleural effusion with complete collapse left lower lobe and partial collapse lingula.    CXR 6/7 - Moderate left pleural effusion with chest tube in place and no pneumothorax. Small right pleural effusion, better characterized on CT.    CXR 6/8 - LEFT chest tube in place. Clearing of LEFT basilar airspace disease and effusion..    CXR 6/9 - Small left pleural effusion, improved  ----------------------------------------------------------------------------------------  PHYSICAL EXAM  Constitutional - NAD, Comfortable  Extremities - Left LE swelling - pitting +1  Neurologic Exam -                    Cognitive - AAO to self, place, date, year, situation     Motor -                      LEFT    UE - ShAB 4/5, EF 5/5, EE 5/5, WE 5/5,  5/5                    RIGHT UE - ShAB 4/5, EF 5/5, EE 5/5, WE 5/5,  5/5                    LEFT    LE - HF 3/5, KE 4/5, DF 2/5                     RIGHT LE - HF 4/5, KE 4/5, DF 4/5      Sensory - Intact to LT  Psychiatric - Mood depressed  ----------------------------------------------------------------------------------------  ASSESSMENT/PLAN  87yMale with functional deficits after developing BLE weakness  Left LE DVT extension - Lovenox  CAD - Plavix, Lipitor  CHF - Demadex  Pleural Effusion - Chest Tube x1  HTN - Imdur, Procardia  Pain - Tylenol, Cymbalta  DVT PPX - SCDs  Rehab - Patient's daughter wants to pursue AR. Discussed with rehab liaison to relay info.     Will continue to follow. Functional progress will determine ongoing rehab dispo recommendations, which may change.    Continue bedside therapy as well as OOB throughout the day with mobilization by staff to maintain cardiopulmonary function and prevention of secondary complications related to debility.     Discussed with rehab team.

## 2021-06-10 NOTE — PROGRESS NOTE ADULT - SUBJECTIVE AND OBJECTIVE BOX
Subjective: "Can you sit me up? I want to eat" NAD Denies cp, sob.    Vital Signs:  Vital Signs Last 24 Hrs  T(C): 36.9 (06-10-21 @ 10:00), Max: 36.9 (06-10-21 @ 05:32)  T(F): 98.4 (06-10-21 @ 10:00), Max: 98.4 (06-10-21 @ 05:32)  HR: 84 (06-10-21 @ 10:00) (68 - 84)  BP: 166/65 (06-10-21 @ 10:00) (146/62 - 166/65)  RR: 18 (06-10-21 @ 10:00) (18 - 18)  SpO2: 95% (06-10-21 @ 10:00) (92% - 95%)     Relevant labs, radiology and Medications reviewed    Pertinent Physical Exam  Gen: thin/WD NAD  Neuro: A+Ox3, nonfocal  Pulm: diminished bs with scattered rhonchi  CV: RRR  LE: no edema    CXR: < from: Xray Chest 1 View- PORTABLE-Routine (Xray Chest 1 View- PORTABLE-Routine in AM.) (06.09.21 @ 05:55) >    IMPRESSION:  Small left pleural effusion, improved    < end of copied text >

## 2021-06-11 ENCOUNTER — TRANSCRIPTION ENCOUNTER (OUTPATIENT)
Age: 86
End: 2021-06-11

## 2021-06-11 LAB
ALBUMIN SERPL ELPH-MCNC: 2.5 G/DL — LOW (ref 3.3–5.2)
ALP SERPL-CCNC: 95 U/L — SIGNIFICANT CHANGE UP (ref 40–120)
ALT FLD-CCNC: 227 U/L — HIGH
ANION GAP SERPL CALC-SCNC: 15 MMOL/L — SIGNIFICANT CHANGE UP (ref 5–17)
AST SERPL-CCNC: 243 U/L — HIGH
BILIRUB SERPL-MCNC: 0.4 MG/DL — SIGNIFICANT CHANGE UP (ref 0.4–2)
BUN SERPL-MCNC: 42.8 MG/DL — HIGH (ref 8–20)
CALCIUM SERPL-MCNC: 8.9 MG/DL — SIGNIFICANT CHANGE UP (ref 8.6–10.2)
CHLORIDE SERPL-SCNC: 99 MMOL/L — SIGNIFICANT CHANGE UP (ref 98–107)
CO2 SERPL-SCNC: 23 MMOL/L — SIGNIFICANT CHANGE UP (ref 22–29)
CREAT SERPL-MCNC: 4.86 MG/DL — HIGH (ref 0.5–1.3)
GLUCOSE SERPL-MCNC: 77 MG/DL — SIGNIFICANT CHANGE UP (ref 70–99)
HCT VFR BLD CALC: 29.7 % — LOW (ref 39–50)
HGB BLD-MCNC: 8.9 G/DL — LOW (ref 13–17)
MCHC RBC-ENTMCNC: 29.6 PG — SIGNIFICANT CHANGE UP (ref 27–34)
MCHC RBC-ENTMCNC: 30 GM/DL — LOW (ref 32–36)
MCV RBC AUTO: 98.7 FL — SIGNIFICANT CHANGE UP (ref 80–100)
MRSA PCR RESULT.: SIGNIFICANT CHANGE UP
PLATELET # BLD AUTO: 240 K/UL — SIGNIFICANT CHANGE UP (ref 150–400)
POTASSIUM SERPL-MCNC: 5.2 MMOL/L — SIGNIFICANT CHANGE UP (ref 3.5–5.3)
POTASSIUM SERPL-SCNC: 5.2 MMOL/L — SIGNIFICANT CHANGE UP (ref 3.5–5.3)
PROT SERPL-MCNC: 5.7 G/DL — LOW (ref 6.6–8.7)
RBC # BLD: 3.01 M/UL — LOW (ref 4.2–5.8)
RBC # FLD: 19.1 % — HIGH (ref 10.3–14.5)
S AUREUS DNA NOSE QL NAA+PROBE: SIGNIFICANT CHANGE UP
SODIUM SERPL-SCNC: 137 MMOL/L — SIGNIFICANT CHANGE UP (ref 135–145)
WBC # BLD: 6.53 K/UL — SIGNIFICANT CHANGE UP (ref 3.8–10.5)
WBC # FLD AUTO: 6.53 K/UL — SIGNIFICANT CHANGE UP (ref 3.8–10.5)

## 2021-06-11 PROCEDURE — 99232 SBSQ HOSP IP/OBS MODERATE 35: CPT

## 2021-06-11 PROCEDURE — 99233 SBSQ HOSP IP/OBS HIGH 50: CPT

## 2021-06-11 PROCEDURE — 99231 SBSQ HOSP IP/OBS SF/LOW 25: CPT

## 2021-06-11 PROCEDURE — 90937 HEMODIALYSIS REPEATED EVAL: CPT

## 2021-06-11 RX ORDER — HYDRALAZINE HCL 50 MG
10 TABLET ORAL ONCE
Refills: 0 | Status: COMPLETED | OUTPATIENT
Start: 2021-06-11 | End: 2021-06-11

## 2021-06-11 RX ORDER — CHLORHEXIDINE GLUCONATE 213 G/1000ML
1 SOLUTION TOPICAL
Refills: 0 | Status: DISCONTINUED | OUTPATIENT
Start: 2021-06-11 | End: 2021-06-21

## 2021-06-11 RX ADMIN — ATORVASTATIN CALCIUM 80 MILLIGRAM(S): 80 TABLET, FILM COATED ORAL at 22:23

## 2021-06-11 RX ADMIN — DULOXETINE HYDROCHLORIDE 60 MILLIGRAM(S): 30 CAPSULE, DELAYED RELEASE ORAL at 14:09

## 2021-06-11 RX ADMIN — Medication 60 MILLIGRAM(S): at 22:22

## 2021-06-11 RX ADMIN — ISOSORBIDE MONONITRATE 30 MILLIGRAM(S): 60 TABLET, EXTENDED RELEASE ORAL at 14:09

## 2021-06-11 RX ADMIN — PANTOPRAZOLE SODIUM 40 MILLIGRAM(S): 20 TABLET, DELAYED RELEASE ORAL at 05:23

## 2021-06-11 RX ADMIN — PANTOPRAZOLE SODIUM 40 MILLIGRAM(S): 20 TABLET, DELAYED RELEASE ORAL at 17:23

## 2021-06-11 RX ADMIN — Medication 90 MILLIGRAM(S): at 05:23

## 2021-06-11 RX ADMIN — ERYTHROPOIETIN 10000 UNIT(S): 10000 INJECTION, SOLUTION INTRAVENOUS; SUBCUTANEOUS at 11:31

## 2021-06-11 RX ADMIN — Medication 10 MILLIGRAM(S): at 16:07

## 2021-06-11 RX ADMIN — ENOXAPARIN SODIUM 65 MILLIGRAM(S): 100 INJECTION SUBCUTANEOUS at 22:23

## 2021-06-11 RX ADMIN — TAMSULOSIN HYDROCHLORIDE 0.4 MILLIGRAM(S): 0.4 CAPSULE ORAL at 22:22

## 2021-06-11 NOTE — PROGRESS NOTE ADULT - ASSESSMENT
87 y/o M with PMHx of HTN, HLD, CAD, HFpEF, s/p Right CEA for Carotid artery disease, ESRD on HD 2d/week (M/Fri) via LUE fistula, s/p flecainide w/ ILR placed 6/2020, AS s/p TAVR, and PAD (RLE fem-pop bypass October 2020) multiple gastric AVM's s/p cauterized s/p LLE fem-pop bypass, and had the bypass performed on 3/20/21, post procedure developed L femoral DVT and s/p revision of LLE bypass 3/25/21 brought to ED with cc of b/l LE ext weakness with difficulty to ambulate for past 2 days.        # B/l LE weakness/ -Symptoms likely secondary to progressing/extensive DVT (Duplex: New extension of LEFT lower extremity thrombus into the LEFT femoral vein)  -Acute CVA ruled  out  -CT head negative for acute pathology  -MRI brain: Trace bilateral frontal subdural collections suggesting subdural hematomas or subdural hygromas.  -Seen in consult by neuro and neurosurgical teams  -MRI C/T/L spine ordered by neurosurgery, no acute actionable findings   -Continue Lipitor 80mg po qhs   - dc  Plavix as per  vascular team due to high bleeding risk    -changed to renal dosed lovenox     #Left large pleural effusion  -Incidental finding on MRI spine imaging. Pt is currently asymptomatic, not short of breath and stable on room air   -CT Chest ordered for further eval: Moderate left pleural effusion with complete collapse left lower lobe and partial collapse lingula.  -Unclear etiology, possibly secondary to fluid shifts/oncotic pressures  -s/p drainage 6/7 , pluerex cath likely to be dcd by ct surgery prior to dc to rehab   -Follow pleural fluid analysis     #Left Lower Extremity DVT  -B/L LE edema, L>R  -Venous duplex b/l LE: New extension of LLE thrombus into L femoral vein  -MRI head with subdural collection suggestive of subdural hematoma or hygroma  -MRI spine with no acute actionable findings  -Vascular surgery consult appreciated: no IVC filter recommended at this time   -discussed with dr. navarro. changed to 1mg / kg qd dose due to renal failure  ,   -dc plavix     #Elevated Troponin levels in setting of ESRD  -Asymptomatic   -EKG: NSR, rate 82/min, first degree AV block   -TTE w/o WMA  -Continue telemetry monitoring   -Cardiology consult appreciated, no further recs/work up     #Generalized weakness, leg weakness     -Likely due to LLE edema that is prominent  -PT eval appreciated: home w/ assist; home w/ home PT vs [facility]  -  family wishing acute rehab       #PVD/HLD/HTN  -c/w statin, nifedipine Imdur  - dcd plavix     #ESRD on HD  -Continue HD  -Nephrology following     #Depression  -Continue duloxetine    #Hx of GI bleed due to gastric AVMs, follows with dr. barrera as op   -Monitor h/h   -Continue PPI    DISPO: family wishes acute rehab . pt recommending  [facility] , sw/cm for dc planning . plan of care discussed in detail with daughter Vanessa prieto ( [facility] nurse manager)

## 2021-06-11 NOTE — PROGRESS NOTE ADULT - ASSESSMENT
87y Male who is followed by neurology because of leg weakness    Leg weakness  Likely related to previous vascular issues, extension of left LE DVT, pain when ambulating  MRI brain did not show stroke  PT/OT  Continue antiplatelet and statin.   Mobilize with physical therapy.     DVT  On lovenox.    Pleural effusion.  CT surgery following.   Now status post left chest tube.     Thank you for allowing me to participate in the care of your patient    Bassam Strong MD, PhD   [id number]

## 2021-06-11 NOTE — PROGRESS NOTE ADULT - SUBJECTIVE AND OBJECTIVE BOX
cc: cva , dvt , elevated trops     inetrval hx; patient seen and eval. patient awake , alert x 3 , comfortable. in no acute distress. no fever or chills. plan for HD today . drained 30 cc fluid from pleurex cath     Vital Signs Last 24 Hrs  T(C): 36.6 (11 Jun 2021 11:54), Max: 37.1 (11 Jun 2021 05:21)  T(F): 97.8 (11 Jun 2021 11:54), Max: 98.8 (11 Jun 2021 05:21)  HR: 74 (11 Jun 2021 14:02) (71 - 83)  BP: 166/72 (11 Jun 2021 14:02) (146/57 - 166/72)  BP(mean): --  RR: 18 (11 Jun 2021 14:02) (16 - 18)  SpO2: 98% (11 Jun 2021 14:02) (95% - 100%)    PHYSICAL EXAM:  GEN: NAD, comfortable  HEENT: NC/AT,  PERRLA, MMM, clear conjunctiva and sclera, normal hearing   CV: S1S2, RRR, no mumur  RESP: decreased breath sounds to LLL base , left pleurex cath in place   ABD: +BS, soft, ND, NT, no guarding, no rigidity, no HSM  EXT: +2 radial and pedal pulses, b/l LE edema, L>R  NEURO: no neuro deficits, moves all extremities, follows all commands  PSYCH: normal behavior                              8.9    6.53  )-----------( 240      ( 11 Jun 2021 06:07 )             29.7   06-11    137  |  99  |  42.8<H>  ----------------------------<  77  5.2   |  23.0  |  4.86<H>    Ca    8.9      11 Jun 2021 06:07    TPro  5.7<L>  /  Alb  2.5<L>  /  TBili  0.4  /  DBili  x   /  AST  243<H>  /  ALT  227<H>  /  AlkPhos  95  06-11                                          IMAGING:     < from: CT Head No Cont (06.05.21 @ 08:20) >  IMPRESSION:  No evidence of acute intracranial abnormality.  No evidence of hemorrhage.  Chronic changes as above.  < end of copied text >    RADIOLOGY:  < from: CT Brain Stroke Protocol (06.02.21 @ 12:59) >  FINDINGS:    There is no acute intracranial hemorrhage or mass effect. There are areas of hypodensity in the bilateral hemispheric white matter suggesting chronic white matter microvascular ischemic change. There is cerebral volume loss. A calcification in the left parietal lobe is again seen.    There is no extraaxial fluid collection.    Intracranial atherosclerotic calcifications.    There is no displaced calvarial fracture. The visualized orbits are within normal limits. The visualized portions of the paranasal sinuses are well aerated. The mastoid air cells are well aerated.      IMPRESSION:    No evidence of intracranial hemorrhage or mass effect. If clinical suspicion for acute infarct persists, brain MRI can be obtained if there is no contraindication.    < from: MR Head No Cont (06.03.21 @ 13:53) >    There is no evidence of acute infarct. Chronic right pontine lacunar infarct. A few small foci of susceptibility artifact in the bilateral cerebral hemispheres suggesting chronic microhemorrhages. Scattered foci of T2/FLAIR hyperintensity in the bilateral hemispheric white matter are nonspecific but likely related to chronic white matter microvascular ischemic disease. There is cerebral volume loss. Trace bilateral frontal subdural collections suggesting subdural hematomas or subdural hygromas.    Flow voids of the major intracranial vessels at the skull base follow expected course and contour.    The paranasal sinuses demonstrate no signal abnormality. The mastoids demonstrate no signal abnormality.  Bilateral orbits are within normal limits.      IMPRESSION:  1.  No acute infarct.  2.  Trace bilateral frontal subdural collections suggesting subdural hematomas or subdural hygromas.    < from: US Duplex Venous Lower Ext Complete, Bilateral (06.03.21 @ 14:52) >  FINDINGS:    RIGHT:  Normal compressibility of the RIGHT common femoral, femoral and popliteal veins.  Doppler examination shows normal spontaneous and phasic flow.  No RIGHT calf vein thrombosis is detected.    LEFT:  There is no evidence of thrombus within the LEFT femoral vein as well as popliteal vein. Flow is seen in the posterior tibial vein. The peroneal vein is not visualized.    IMPRESSION:  New extension of LEFT lower extremity thrombus into the LEFT femoral vein.    < from: MR Lumbar Spine No Cont (06.04.21 @ 11:23) >  Thoracic spine:  1.  No evidence of epidural collection.  2.  There are T1, T2, and STIR hyperintense lesions in the T10 vertebral body and left T3 pedicle. These are nonspecific but could represent atypical hemangiomas.  3.  Large left pleural effusion. Consider CT chest.    Lumbar spine:  1.  No evidence of epidural collection.  2.  Lumbar degenerative changes. At L4-L5, there is severe spinal canal narrowing.  3.  T2 and STIR hyperin

## 2021-06-11 NOTE — DISCHARGE NOTE PROVIDER - DETAILS OF MALNUTRITION DIAGNOSIS/DIAGNOSES
This patient has been assessed with a concern for Malnutrition and was treated during this hospitalization for the following Nutrition diagnosis/diagnoses:     -  06/07/2021: Moderate protein-calorie malnutrition

## 2021-06-11 NOTE — DISCHARGE NOTE PROVIDER - HOSPITAL COURSE
85 y/o M with PMHx of HTN, HLD, CAD, HFpEF, s/p Right CEA for Carotid artery disease, ESRD on HD 2d/week (M/Fri) via LUE fistula, s/p flecainide w/ ILR placed 6/2020, AS s/p TAVR, and PAD (RLE fem-pop bypass October 2020) multiple gastric AVM's s/p cauterized s/p LLE fem-pop bypass, and had the bypass performed on 3/20/21, post procedure developed L femoral DVT and s/p revision of LLE bypass 3/25/21 brought to ED with cc of b/l LE ext weakness with difficulty to ambulate for past 2 days.        # B/l LE weakness/ -Symptoms likely secondary to progressing/extensive DVT (Duplex: New extension of LEFT lower extremity thrombus into the LEFT femoral vein)  -Acute CVA ruled  out  -CT head negative for acute pathology  -MRI brain: Trace bilateral frontal subdural collections suggesting subdural hematomas or subdural hygromas.  -Seen in consult by neuro and neurosurgical teams  -MRI C/T/L spine ordered by neurosurgery, no acute actionable findings   -Continue Lipitor 80mg po qhs   - dc  Plavix as per  vascular team due to high bleeding risk    -changed to renal dosed lovenox     #Left large pleural effusion  -Incidental finding on MRI spine imaging. Pt is currently asymptomatic, not short of breath and stable on room air   -CT Chest ordered for further eval: Moderate left pleural effusion with complete collapse left lower lobe and partial collapse lingula.  -Unclear etiology, possibly secondary to fluid shifts/oncotic pressures  -s/p drainage 6/7 , pluerex cath likely to be dcd by ct surgery prior to dc to rehab   -Follow pleural fluid analysis     #Left Lower Extremity DVT  -B/L LE edema, L>R  -Venous duplex b/l LE: New extension of LLE thrombus into L femoral vein  -MRI head with subdural collection suggestive of subdural hematoma or hygroma  -MRI spine with no acute actionable findings  -Vascular surgery consult appreciated: no IVC filter recommended at this time   -discussed with dr. navarro. changed to 1mg / kg qd dose due to renal failure  ,   -dc plavix     #Elevated Troponin levels in setting of ESRD  -Asymptomatic   -EKG: NSR, rate 82/min, first degree AV block   -TTE w/o WMA  -Continue telemetry monitoring   -Cardiology consult appreciated, no further recs/work up     #Generalized weakness, leg weakness     -Likely due to LLE edema that is prominent  -PT eval appreciated: home w/ assist; home w/ home PT vs [facility]  -  family wishing acute rehab       #PVD/HLD/HTN  -c/w statin, nifedipine Imdur  - dcd plavix     #ESRD on HD  -Continue HD  -Nephrology following     #Depression  -Continue duloxetine    #Hx of GI bleed due to gastric AVMs, follows with dr. barrera as op   -Monitor h/h   -Continue PPI    DISPO: family wishes acute rehab . pt recommending  [facility] , sw/cm for dc planning . plan of care discussed in detail with daughter Vanessa prieto ( [facility] nurse manager)    85 y/o M with PMHx of HTN, HLD, CAD, HFpEF, s/p Right CEA for Carotid artery disease, ESRD on HD 2d/week (M/Fri) via LUE fistula, s/p flecainide w/ ILR placed 6/2020, AS s/p TAVR, and PAD (RLE fem-pop bypass October 2020) multiple gastric AVM's s/p cauterized s/p LLE fem-pop bypass, and had the bypass performed on 3/20/21, post procedure developed L femoral DVT and s/p revision of LLE bypass 3/25/21 brought to ED with cc of b/l LE ext weakness with difficulty to ambulate for 2 days prior to admit.      # B/l LE weakness/ -Symptoms likely secondary to progressing/extensive DVT (Duplex: New extension of LEFT lower extremity thrombus into the LEFT femoral vein)  -Seen in consult by neuro and neurosurgical teams  -Acute CVA ruled  out  (CT head and MRI brain negative for acute pathology)  -MRI C/T/L spine ordered by neurosurgery, no acute actionable findings   -Continue Lipitor 80mg po qhs     #Left Lower Extremity DVT  -B/L LE edema, L>R  -Venous duplex b/l LE: New extension of LLE thrombus into L femoral vein  -Vascular surgery consult appreciated: no IVC filter recommended at this time   - cont renal dose lovenox  -dc plavix as per  vascular team due to high bleeding risk      #Left large pleural effusion requiring thoracentesis and CT placement  - pigtail catheter removed 6/15 with no reaccumulation    #PVD/HLD/HTN  -c/w statin, nifedipine Imdur  - dcd plavix     #ESRD on HD  -Continue HD    #Depression  -Continue duloxetine    #Hx of GI bleed due to gastric AVMs, follows with dr. barrera as op   -Monitor h/h   -Continue PPI    Patient has been assessed by physical medicine and will be discharged to acute rehab to increase functional mobility and independence with ADLs.   87 y/o M with PMHx of HTN, HLD, CAD, HFpEF, s/p Right CEA for Carotid artery disease, ESRD on HD 2d/week (M/Fri) via LUE fistula, s/p flecainide w/ ILR placed 6/2020, AS s/p TAVR, and PAD (RLE fem-pop bypass October 2020) multiple gastric AVM's s/p cauterized s/p LLE fem-pop bypass, and had the bypass performed on 3/20/21, post procedure developed L femoral DVT and s/p revision of LLE bypass 3/25/21 brought to ED with cc of b/l LE ext weakness with difficulty to ambulate for 2 days prior to admission.   awaiting acute rehab placement which was denied on 6/17. Ohez-vf-lyda was denied on 6/18. Currently awaiting [facility]. Chest tube was removed by CT surgery on 6/14.    # B/l LE weakness - Symptoms likely secondary to progressing/extensive DVT (Duplex: New extension of LEFT lower extremity thrombus into the LEFT femoral vein)  - Acute CVA ruled out  - CT head negative for acute pathology  - MRI brain: Trace bilateral frontal subdural collections suggesting subdural hematomas or subdural hygromas.  - Seen in consult by neuro and neurosurgical teams  - MRI C/T/L spine ordered by neurosurgery, no acute actionable findings   - Continue Lipitor 80mg po qhs   - dc  Plavix as per  vascular team due to high bleeding risk    - changed to renal dosed lovenox    #Left large pleural effusion - transudative; first identified on MRI spine and followed up with CT chest which confirmed  - s/p drainage 6/7 , pluerx cath placed on 6/7  - removed on 6/14  - CXR repeated shows only samll left effusion     #cough - overall much improved/ likely from manipulation of the pleural space  - repeat CXR without changes to suggest reaccumulation of fluid or infiltrative process  - will monitor clinically for signs/symptoms of fever/chills or increased WBC that would suggest pneumonia    #Left Lower Extremity DVT  - B/L LE edema, L>R  - Venous duplex b/l LE: New extension of LLE thrombus into L femoral vein  - MRI head with subdural collection suggestive of subdural hematoma or hygroma  - MRI spine with no acute actionable findings  - Vascular surgery consult appreciated: no IVC filter recommended at this time   - renal dose lovenox  - dcd plavix     #Elevated Troponin levels in setting of ESRD  - Asymptomatic   - EKG: NSR, rate 82/min, first degree AV block   - TTE w/o WMA  -  Continue telemetry monitoring   - Cardiology consult appreciated, no further recs/work up     #Generalized weakness, leg weakness     - PT eval appreciated: home w/ assist; home w/ home PT vs [facility]  - family wishing acute rehab     #PVD/HLD/HTN  - c/w statin, nifedipine Imdur  - dcd plavix     #ESRD on HD  - Continue HD  - Nephrology following     #Depression  - Continue duloxetine    #Hx of GI bleed due to gastric AVMs, follows with dr. barrera as op   - Monitor h/h   - Continue PPI    #dvt ppx: on sq lovenox     DISPO: plan for dc to [facility] , plan of care discussed with patient and daughter     Vital Signs Last 24 Hrs  T(C): 36.6 (21 Jun 2021 08:49), Max: 36.6 (21 Jun 2021 08:49)  T(F): 97.8 (21 Jun 2021 08:49), Max: 97.8 (21 Jun 2021 08:49)  HR: 69 (21 Jun 2021 08:49) (69 - 77)  BP: 125/59 (21 Jun 2021 08:49) (125/59 - 167/72)  BP(mean): --  RR: 18 (21 Jun 2021 08:49) (18 - 18)  SpO2: 94% (21 Jun 2021 08:49) (92% - 96%)

## 2021-06-11 NOTE — PROGRESS NOTE ADULT - SUBJECTIVE AND OBJECTIVE BOX
Subjective:  pt in bed in chair position eating lunch.. NAD    T(C): 36.6 (06-11-21 @ 11:54), Max: 37.1 (06-11-21 @ 05:21)  HR: 74 (06-11-21 @ 14:02) (71 - 83)  BP: 166/72 (06-11-21 @ 14:02) (146/57 - 166/72)  ABP: --  ABP(mean): --  RR: 18 (06-11-21 @ 14:02) (16 - 18)  SpO2: 98% (06-11-21 @ 14:02) (95% - 100%) RA  Wt(kg): --  CVP(mm Hg): --  CO: --  CI: --  PA: --                                              Tele:    CHEST TUBE:       Left 100cc   per 24 hours    AIR LEAKS:  [ ] YES [ x] NO          06-11    137  |  99  |  42.8<H>  ----------------------------<  77  5.2   |  23.0  |  4.86<H>    Ca    8.9      11 Jun 2021 06:07    TPro  5.7<L>  /  Alb  2.5<L>  /  TBili  0.4  /  DBili  x   /  AST  243<H>  /  ALT  227<H>  /  AlkPhos  95  06-11                               8.9    6.53  )-----------( 240      ( 11 Jun 2021 06:07 )             29.7                 CAPILLARY BLOOD GLUCOSE               CXR: < from: Xray Chest 1 View- PORTABLE-Routine (Xray Chest 1 View- PORTABLE-Routine in AM.) (06.10.21 @ 06:03) >  Two expiratory/kyphotic views are compared to 6/9/2021 and demonstrate a normal cardiac silhouette and normal pulmonary vasculature with no gross consolidation, effusion, pneumothorax or acute osseous finding. Left chest tube in place with no pneumothorax, unchanged    Increased perihilar markings may represent infiltrate inthe correct clinical context.    IMPRESSION:  No pleural effusion.    Increased perihilar markings may represent infiltrate in the correct clinical context    < end of copied text >        exam   Gen: thin/WD NAD  Neuro: A+Ox3, nonfocal  Pulm: diminished bs with scattered rhonchi  CV: RRR  LE: no edema          Assessment:  87yMale    with PAST MEDICAL & SURGICAL HISTORY:  DM (diabetes mellitus)  - resolved    AV block, 1st degree    Arrhythmia    CAD (coronary artery disease)    HTN (hypertension)    VT (ventricular tachycardia)    RICHARDS (dyspnea on exertion)    Anemia    Risk factors for obstructive sleep apnea    ESRD on dialysis  HD on Mondays and Fridays    Aortic stenosis  - s/p valve replacement    GI bleeding  due to ulcerated polyps and colonic AVMs    H/O circumcision  at  age  65    H/O left knee surgery    H/O angioplasty  2013,  no  intervention    A-V fistula  left arm 5/2017    H/O carotid endarterectomy  Right    S/P TAVR (transcatheter aortic valve replacement)    History of loop recorder          Plan:

## 2021-06-11 NOTE — DISCHARGE NOTE PROVIDER - PROVIDER TOKENS
PROVIDER:[TOKEN:[[id number]],FOLLOWUP:[1 month]],PROVIDER:[TOKEN:[[id number]],FOLLOWUP:[1-3 days]],FREE:[LAST:[primary care],PHONE:[(   )    -],FAX:[(   )    -],FOLLOWUP:[1-3 days]],PROVIDER:[TOKEN:[[id number]],FOLLOWUP:[1 month]]

## 2021-06-11 NOTE — PROGRESS NOTE ADULT - SUBJECTIVE AND OBJECTIVE BOX
[facility]                                        [facility] [place]                                 [address]                                  [address]                                        [place], [place], [address]                                             [phone]        CC: Gait difficulty    HPI:  The patient is a 87y Male who presented with difficulty walking for 3 days.  He has history of multiple surgeries for PAD on his lower extremities, in March he had left fem-pop bypass complicated by DVT and went to rehab.  He was able to walk about 15-20 feet with a walker until it was too painful and he had to stop.  For the past three days he has noticed worsening b/l foot numbness with difficulty walking and dragging his feet.    Interim history: still feels leg dragging when walking, pain in legs    Review of systems (neurology): Denies headache or dizziness. Denies weakness, (+) b/l feet numbness.  Denies speech/language deficits. Denies diplopia/blurred vision.  Denies confusion (+) difficulty walking    MEDICATIONS  (STANDING):  atorvastatin 80 milliGRAM(s) Oral at bedtime  chlorhexidine 2% Cloths 1 Application(s) Topical <User Schedule>  DULoxetine 60 milliGRAM(s) Oral daily  enoxaparin Injectable 65 milliGRAM(s) SubCutaneous daily  epoetin juan-epbx (RETACRIT) Injectable 85167 Unit(s) IV Push <User Schedule>  isosorbide   mononitrate ER Tablet (IMDUR) 30 milliGRAM(s) Oral daily  NIFEdipine XL 90 milliGRAM(s) Oral daily  NIFEdipine XL 60 milliGRAM(s) Oral at bedtime  pantoprazole    Tablet 40 milliGRAM(s) Oral two times a day  tamsulosin 0.4 milliGRAM(s) Oral at bedtime  torsemide 20 milliGRAM(s) Oral <User Schedule>    MEDICATIONS  (PRN):      Vital Signs Last 24 Hrs  T(C): 36.6 (11 Jun 2021 11:54), Max: 37.1 (11 Jun 2021 05:21)  T(F): 97.8 (11 Jun 2021 11:54), Max: 98.8 (11 Jun 2021 05:21)  HR: 74 (11 Jun 2021 14:02) (71 - 83)  BP: 166/72 (11 Jun 2021 14:02) (146/57 - 166/72)  BP(mean): --  RR: 18 (11 Jun 2021 14:02) (16 - 18)  SpO2: 98% (11 Jun 2021 14:02) (95% - 100%)    Detailed Neurologic Exam:    Mental status: The patient is awake and alert. There is no aphasia. There is no dysarthria.     Cranial nerves: Pupils equal and react symmetrically to light. There is no visual field deficit to confrontation. Extraocular motion is full with no nystagmus. There is no ptosis. Facial sensation is intact. Facial musculature is symmetric. Palate elevates symmetrically.  Tongue is midline.    Motor: There is normal bulk and tone.  There is no tremor.  Strength is 5/5 in the right arm and 4+/5leg.   Strength is 5/5 in the left arm and 4/5 leg.    Sensation: Intact to light touch and pin in 4 extremities, except for decreased at both feet    Reflexes: 1+ throughout and plantar responses are equivocal.    Cerebellar: There is no dysmetria on finger to nose testing.    Labs:                        8.9    6.53  )-----------( 240      ( 11 Jun 2021 06:07 )             29.7     06-11    137  |  99  |  42.8<H>  ----------------------------<  77  5.2   |  23.0  |  4.86<H>    Ca    8.9      11 Jun 2021 06:07    TPro  5.7<L>  /  Alb  2.5<L>  /  TBili  0.4  /  DBili  x   /  AST  243<H>  /  ALT  227<H>  /  AlkPhos  95  06-11    LIVER FUNCTIONS - ( 11 Jun 2021 06:07 )  Alb: 2.5 g/dL / Pro: 5.7 g/dL / ALK PHOS: 95 U/L / ALT: 227 U/L / AST: 243 U/L / GGT: x

## 2021-06-11 NOTE — DISCHARGE NOTE PROVIDER - CARE PROVIDER_API CALL
Siva Winston)  Surgery; Vascular Surgery  [address], [address] [place] [address]  Phone: [phone]  Fax: [phone]  Follow Up Time: 1 month    Sergio Johnston)  Nephrology  [address], [place] [address]  Phone: [phone]  Fax: [phone]  Follow Up Time: 1-3 days    primary care,   Phone: (   )    -  Fax: (   )    -  Follow Up Time: 1-3 days    Evangelist Giron)  Gastroenterology; Internal Medicine  [address] [facility], [address] [place] [address]  Phone: [phone]  Fax: [phone]  Follow Up Time: 1 month

## 2021-06-11 NOTE — PROGRESS NOTE ADULT - SUBJECTIVE AND OBJECTIVE BOX
Patient feels tired.  Reports pain in the back at site of tube.   Is a bit depressed about his leg weakness.  Grandson present and discussed plan.     REVIEW OF SYSTEMS  Constitutional - No fever,  +fatigue  Neurological - No headaches, No memory loss, +loss of strength   Musculoskeletal - +joint pain, +joint swelling, +muscle pain  Psychiatric - +depression, No anxiety    FUNCTIONAL PROGRESS  6/10  Sit-Stand Transfer Training  Transfer Training Sit-to-Stand Transfer: moderate assist (50% patient effort);  1 person assist;  verbal cues;  full weight-bearing  Transfer Training Stand-to-Sit Transfer: moderate assist (50% patient effort);  1 person assist;  verbal cues;  full weight-bearing  Sit-to-Stand Transfer Training Transfer Safety Analysis: decreased sequencing ability;  decreased weight-shifting ability;  decreased strength;  rolling walker    Gait Training  Gait Training: unable to perform;  pt unable to stand more then few seconds secondary to pain in bilateral LE and anxiety      VITALS  T(C): 36.9 (06-11-21 @ 08:04), Max: 37.1 (06-11-21 @ 05:21)  HR: 83 (06-11-21 @ 08:04) (74 - 83)  BP: 158/70 (06-11-21 @ 08:04) (146/61 - 160/62)  RR: 18 (06-11-21 @ 08:04) (16 - 18)  SpO2: 97% (06-11-21 @ 08:04) (95% - 97%)  Wt(kg): --    MEDICATIONS   atorvastatin 80 milliGRAM(s) at bedtime  chlorhexidine 2% Cloths 1 Application(s) <User Schedule>  DULoxetine 60 milliGRAM(s) daily  enoxaparin Injectable 65 milliGRAM(s) daily  epoetin juan-epbx (RETACRIT) Injectable 13715 Unit(s) <User Schedule>  isosorbide   mononitrate ER Tablet (IMDUR) 30 milliGRAM(s) daily  NIFEdipine XL 90 milliGRAM(s) daily  NIFEdipine XL 60 milliGRAM(s) at bedtime  pantoprazole    Tablet 40 milliGRAM(s) two times a day  tamsulosin 0.4 milliGRAM(s) at bedtime  torsemide 20 milliGRAM(s) <User Schedule>      RECENT LABS/IMAGING                          8.9    6.53  )-----------( 240      ( 11 Jun 2021 06:07 )             29.7     06-11    137  |  99  |  42.8<H>  ----------------------------<  77  5.2   |  23.0  |  4.86<H>    Ca    8.9      11 Jun 2021 06:07    TPro  5.7<L>  /  Alb  2.5<L>  /  TBili  0.4  /  DBili  x   /  AST  243<H>  /  ALT  227<H>  /  AlkPhos  95  06-11                  MRI HEAD 6/3 - 1.  No acute infarct. 2.  Trace bilateral frontal subdural collections suggesting subdural hematomas or subdural hygromas.    BLE DOPPLER 6/3 - New extension of LEFT lower extremity thrombus into the LEFT femoral vein.    MRI Thoracic spine 6/4 1.  No evidence of epidural collection. 2.  There are T1, T2, and STIR hyperintense lesions in the T10 vertebral body and left T3 pedicle. These are nonspecific but could represent atypical hemangiomas. 3.  Large left pleural effusion. Consider CT chest.    MRI Lumbar spine 6/4 - 1.  No evidence of epidural collection. 2.  Lumbar degenerative changes. At L4-L5, there is severe spinal canal narrowing. 3.  T2 and STIR hyperintense lesions are noted in the L3 and L5 vertebral bodies, which are nonspecific but could represent atypical hemangiomas.    CT CHEST 6/6 - Moderate left pleural effusion with complete collapse left lower lobe and partial collapse lingula.    CXR 6/7 - Moderate left pleural effusion with chest tube in place and no pneumothorax. Small right pleural effusion, better characterized on CT.    CXR 6/8 - LEFT chest tube in place. Clearing of LEFT basilar airspace disease and effusion..    CXR 6/9 - Small left pleural effusion, improved    CXR 6/9 - No pleural effusion. Increased perihilar markings may represent infiltrate in the correct clinical context  ----------------------------------------------------------------------------------------  PHYSICAL EXAM  Constitutional - NAD, Comfortable  Extremities - Left LE swelling - pitting +1  Neurologic Exam -         Motor -                      LEFT    UE - ShAB 4/5, EF 5/5, EE 5/5, WE 5/5,  5/5                    RIGHT UE - ShAB 4/5, EF 5/5, EE 5/5, WE 5/5,  5/5                    LEFT    LE - HF 3/5, KE 4/5, DF 2/5                     RIGHT LE - HF 4/5, KE 4/5, DF 4/5      Sensory - Intact to LT  Psychiatric - Mood depressed  ----------------------------------------------------------------------------------------  ASSESSMENT/PLAN  87yMale with functional deficits after developing BLE weakness  Left LE DVT extension - Lovenox  CAD - Plavix, Lipitor  CHF - Demadex  Pleural Effusion - Chest Tube x1  HTN - Imdur, Procardia  Pain - Tylenol, Cymbalta  DVT PPX - SCDs  Rehab - Plan is to pursue AR pending auth. CM aware.     Continue bedside therapy as well as OOB throughout the day with mobilization by staff to maintain cardiopulmonary function and prevention of secondary complications related to debility.     Discussed with rehab team.

## 2021-06-11 NOTE — DISCHARGE NOTE PROVIDER - NSDCMRMEDTOKEN_GEN_ALL_CORE_FT
Cymbalta 60 mg oral delayed release capsule: 1 cap(s) orally once a day  Florastor 250 mg oral capsule: 1 cap(s) orally 2 times a day  isosorbide mononitrate 30 mg oral tablet, extended release: 1 tab(s) orally once a day  Lipitor 80 mg oral tablet: 1 tab(s) orally once a day (at bedtime)  Nephro-Thomas oral tablet: 1 tab(s) orally once a day  NIFEdipine 60 mg oral tablet, extended release: 1 tab(s) orally once a day (at bedtime)  NIFEdipine 90 mg oral tablet, extended release: 1 tab(s) orally once a day  ocular lubricant ophthalmic solution: 1 drop(s) to each affected eye 2 times a day, As needed, Dry Eyes  pantoprazole 40 mg oral delayed release tablet: 1 tab(s) orally every 12 hours  Plavix 75 mg oral tablet: 1 tab(s) orally once a day  tamsulosin 0.4 mg oral capsule: 1 cap(s) orally once a day (at bedtime)  torsemide 20 mg oral tablet: 1 tab(s) orally on non HD days   acetaminophen 325 mg oral tablet: 2 tab(s) orally every 6 hours, As needed, Temp greater or equal to 38C (100.4F), Mild Pain (1 - 3)  atorvastatin 80 mg oral tablet: 1 tab(s) orally once a day (at bedtime)  DULoxetine 30 mg oral delayed release capsule: 3 cap(s) orally once a day  Florastor 250 mg oral capsule: 1 cap(s) orally 2 times a day  isosorbide mononitrate 30 mg oral tablet, extended release: 1 tab(s) orally once a day  Nephro-Thomas oral tablet: 1 tab(s) orally once a day  NIFEdipine (Eqv-Adalat CC) 60 mg oral tablet, extended release: 1 tab(s) orally once a day (at bedtime)  NIFEdipine 60 mg oral tablet, extended release: 1 tab(s) orally once a day (at bedtime)  NIFEdipine 90 mg oral tablet, extended release: 1 tab(s) orally once a day  NIFEdipine 90 mg oral tablet, extended release: 1 tab(s) orally once a day  ocular lubricant ophthalmic solution: 1 drop(s) to each affected eye 2 times a day, As needed, Dry Eyes  pantoprazole 40 mg oral delayed release tablet: 1 tab(s) orally every 12 hours  Plavix 75 mg oral tablet: 1 tab(s) orally once a day  tamsulosin 0.4 mg oral capsule: 1 cap(s) orally once a day (at bedtime)  torsemide 20 mg oral tablet: 1 tab(s) orally on non HD days   acetaminophen 325 mg oral tablet: 2 tab(s) orally every 6 hours, As needed, Temp greater or equal to 38C (100.4F), Mild Pain (1 - 3)  atorvastatin 80 mg oral tablet: 1 tab(s) orally once a day (at bedtime)  DULoxetine 30 mg oral delayed release capsule: 3 cap(s) orally once a day  enoxaparin: 65 milligram(s) subcutaneous once a day  isosorbide mononitrate 30 mg oral tablet, extended release: 1 tab(s) orally once a day  Nephro-Thomas oral tablet: 1 tab(s) orally once a day  NIFEdipine (Eqv-Adalat CC) 60 mg oral tablet, extended release: 1 tab(s) orally once a day (at bedtime)  NIFEdipine 90 mg oral tablet, extended release: 1 tab(s) orally once a day  pantoprazole 40 mg oral delayed release tablet: 1 tab(s) orally every 12 hours  tamsulosin 0.4 mg oral capsule: 1 cap(s) orally once a day (at bedtime)  torsemide 20 mg oral tablet: 1 tab(s) orally on non HD days   acetaminophen 325 mg oral tablet: 2 tab(s) orally every 6 hours, As needed, Temp greater or equal to 38C (100.4F), Mild Pain (1 - 3)  atorvastatin 80 mg oral tablet: 1 tab(s) orally once a day (at bedtime)  DULoxetine 30 mg oral delayed release capsule: 3 cap(s) orally once a day  enoxaparin: 65 milligram(s) subcutaneous once a day  guaifenesin-dextromethorphan 100 mg-10 mg/5 mL oral liquid: 10 milliliter(s) orally every 6 hours, As needed, Cough  isosorbide mononitrate 30 mg oral tablet, extended release: 1 tab(s) orally once a day  Nephro-Thomas oral tablet: 1 tab(s) orally once a day  NIFEdipine 90 mg oral tablet, extended release: 1 tab(s) orally once a day  pantoprazole 40 mg oral delayed release tablet: 1 tab(s) orally every 12 hours  tamsulosin 0.4 mg oral capsule: 1 cap(s) orally once a day (at bedtime)  torsemide 20 mg oral tablet: 1 tab(s) orally on non HD days

## 2021-06-11 NOTE — PROGRESS NOTE ADULT - SUBJECTIVE AND OBJECTIVE BOX
Patient was seen and evaluated on dialysis.   Patient is tolerating the procedure well.   T(C): 36.9 (06-11-21 @ 08:04), Max: 37.1 (06-11-21 @ 05:21)  HR: 83 (06-11-21 @ 08:04) (74 - 83)  BP: 158/70 (06-11-21 @ 08:04) (146/61 - 160/62)  Continue dialysis:   Dialyzer:   revaclear 300 QB:  400  QD: 500ml  Goal UF 1.5 kg  over 3.5 Hours

## 2021-06-11 NOTE — DISCHARGE NOTE PROVIDER - CARE PROVIDERS DIRECT ADDRESSES
[email or web address],DirectAddress_[email or web address]

## 2021-06-11 NOTE — DISCHARGE NOTE PROVIDER - NSDCFUSCHEDAPPT_GEN_ALL_CORE_FT
CHRISTIANO ELIZABETH ; 06/28/2021 ; [facility] CHRISTIANO Rayo ; 08/02/2021 ; [facility] [address] [place]

## 2021-06-11 NOTE — DISCHARGE NOTE PROVIDER - NSDCCPCAREPLAN_GEN_ALL_CORE_FT
PRINCIPAL DISCHARGE DIAGNOSIS  Diagnosis: Lower extremity weakness  Assessment and Plan of Treatment: Bilateral - acute rehab to increase functional mobility and independence with ADLs      SECONDARY DISCHARGE DIAGNOSES  Diagnosis: Left leg DVT  Assessment and Plan of Treatment: progressive, extensive - continue renal dose lovenox    Diagnosis: Pleural effusion, left  Assessment and Plan of Treatment: transudative, s/p thorocentesis and chest tube, resolved    Diagnosis: ESRD on hemodialysis  Assessment and Plan of Treatment: Continue dialysis, receives epogen at dialysis    Diagnosis: PVD (peripheral vascular disease)  Assessment and Plan of Treatment: HLD/HTN - continue Imdur, stating, nifedipine     PRINCIPAL DISCHARGE DIAGNOSIS  Diagnosis: Lower extremity weakness  Assessment and Plan of Treatment: Bilateral - acute rehab to increase functional mobility and independence with ADLs      SECONDARY DISCHARGE DIAGNOSES  Diagnosis: Left leg DVT  Assessment and Plan of Treatment: progressive, extensive - continue renal dose lovenox    Diagnosis: Pleural effusion, left  Assessment and Plan of Treatment: transudative, s/p thorocentesis and chest tube, resolved    Diagnosis: ESRD on hemodialysis  Assessment and Plan of Treatment: Continue dialysis, receives epogen at dialysis    Diagnosis: PVD (peripheral vascular disease)  Assessment and Plan of Treatment: HLD/HTN - continue Imdur, statin, nifedipine     PRINCIPAL DISCHARGE DIAGNOSIS  Diagnosis: Lower extremity weakness  Assessment and Plan of Treatment: plan for rehab to increase functional mobility and independence with ADLs      SECONDARY DISCHARGE DIAGNOSES  Diagnosis: Left leg DVT  Assessment and Plan of Treatment: continue renal dose lovenox  follow up with dr. navarro in 4 weeks as out patient    Diagnosis: Pleural effusion, left  Assessment and Plan of Treatment: transudative, s/p thorocentesis and chest tube, resolved  follow up with primary care for repeat chest x ray in 4 weeks.    Diagnosis: ESRD on hemodialysis  Assessment and Plan of Treatment: Continue dialysis, receives epogen at dialysis    Diagnosis: PVD (peripheral vascular disease)  Assessment and Plan of Treatment: continue Imdur, statin, nifedipine

## 2021-06-12 LAB
CULTURE RESULTS: SIGNIFICANT CHANGE UP
HCT VFR BLD CALC: 31.7 % — LOW (ref 39–50)
HGB BLD-MCNC: 9.2 G/DL — LOW (ref 13–17)
MCHC RBC-ENTMCNC: 28.9 PG — SIGNIFICANT CHANGE UP (ref 27–34)
MCHC RBC-ENTMCNC: 29 GM/DL — LOW (ref 32–36)
MCV RBC AUTO: 99.7 FL — SIGNIFICANT CHANGE UP (ref 80–100)
PLATELET # BLD AUTO: 227 K/UL — SIGNIFICANT CHANGE UP (ref 150–400)
RBC # BLD: 3.18 M/UL — LOW (ref 4.2–5.8)
RBC # FLD: 19.1 % — HIGH (ref 10.3–14.5)
SPECIMEN SOURCE: SIGNIFICANT CHANGE UP
WBC # BLD: 6.31 K/UL — SIGNIFICANT CHANGE UP (ref 3.8–10.5)
WBC # FLD AUTO: 6.31 K/UL — SIGNIFICANT CHANGE UP (ref 3.8–10.5)

## 2021-06-12 PROCEDURE — 99233 SBSQ HOSP IP/OBS HIGH 50: CPT

## 2021-06-12 PROCEDURE — 71045 X-RAY EXAM CHEST 1 VIEW: CPT | Mod: 26

## 2021-06-12 PROCEDURE — 99232 SBSQ HOSP IP/OBS MODERATE 35: CPT

## 2021-06-12 PROCEDURE — 99231 SBSQ HOSP IP/OBS SF/LOW 25: CPT

## 2021-06-12 RX ADMIN — ATORVASTATIN CALCIUM 80 MILLIGRAM(S): 80 TABLET, FILM COATED ORAL at 23:00

## 2021-06-12 RX ADMIN — Medication 60 MILLIGRAM(S): at 23:03

## 2021-06-12 RX ADMIN — ENOXAPARIN SODIUM 65 MILLIGRAM(S): 100 INJECTION SUBCUTANEOUS at 23:00

## 2021-06-12 RX ADMIN — Medication 90 MILLIGRAM(S): at 06:07

## 2021-06-12 RX ADMIN — ISOSORBIDE MONONITRATE 30 MILLIGRAM(S): 60 TABLET, EXTENDED RELEASE ORAL at 10:08

## 2021-06-12 RX ADMIN — PANTOPRAZOLE SODIUM 40 MILLIGRAM(S): 20 TABLET, DELAYED RELEASE ORAL at 06:07

## 2021-06-12 RX ADMIN — DULOXETINE HYDROCHLORIDE 60 MILLIGRAM(S): 30 CAPSULE, DELAYED RELEASE ORAL at 08:57

## 2021-06-12 RX ADMIN — CHLORHEXIDINE GLUCONATE 1 APPLICATION(S): 213 SOLUTION TOPICAL at 06:30

## 2021-06-12 RX ADMIN — PANTOPRAZOLE SODIUM 40 MILLIGRAM(S): 20 TABLET, DELAYED RELEASE ORAL at 17:11

## 2021-06-12 RX ADMIN — TAMSULOSIN HYDROCHLORIDE 0.4 MILLIGRAM(S): 0.4 CAPSULE ORAL at 23:00

## 2021-06-12 NOTE — PROGRESS NOTE ADULT - ASSESSMENT
87 y/o M with PMHx of HTN, HLD, CAD, HFpEF, s/p Right CEA for Carotid artery disease, ESRD on HD 2d/week (M/Fri) via LUE fistula, s/p flecainide w/ ILR placed 6/2020, AS s/p TAVR, and PAD (RLE fem-pop bypass October 2020) multiple gastric AVM's s/p cauterized s/p LLE fem-pop bypass, and had the bypass performed on 3/20/21, post procedure developed L femoral DVT and s/p revision of LLE bypass 3/25/21 brought to ED with cc of b/l LE ext weakness with difficulty to ambulate for past 2 days.      # B/l LE weakness/ -Symptoms likely secondary to progressing/extensive DVT (Duplex: New extension of LEFT lower extremity thrombus into the LEFT femoral vein)  -Acute CVA ruled  out  - CT head negative for acute pathology  - MRI brain: Trace bilateral frontal subdural collections suggesting subdural hematomas or subdural hygromas.  - Seen in consult by neuro and neurosurgical teams  - MRI C/T/L spine ordered by neurosurgery, no acute actionable findings   - Continue Lipitor 80mg po qhs   - dc  Plavix as per  vascular team due to high bleeding risk    - changed to renal dosed lovenox     #Left large pleural effusion  - Incidental finding on MRI spine imaging. Pt is currently asymptomatic, not short of breath and stable on room air   - CT Chest ordered for further eval: Moderate left pleural effusion with complete collapse left lower lobe and partial collapse lingula.  - Unclear etiology, possibly secondary to fluid shifts/oncotic pressures  - s/p drainage 6/7 , pluerex cath likely to be dcd by ct surgery prior to dc to rehab     #Left Lower Extremity DVT  - B/L LE edema, L>R  - Venous duplex b/l LE: New extension of LLE thrombus into L femoral vein  - MRI head with subdural collection suggestive of subdural hematoma or hygroma  - MRI spine with no acute actionable findings  - Vascular surgery consult appreciated: no IVC filter recommended at this time   - discussed with dr. navarro. changed to 1mg / kg qd dose due to renal failure  ,   - dc plavix     #Elevated Troponin levels in setting of ESRD  - Asymptomatic   - EKG: NSR, rate 82/min, first degree AV block   - TTE w/o WMA  -  Continue telemetry monitoring   -Cardiology consult appreciated, no further recs/work up     #Generalized weakness, leg weakness     - Likely due to LLE edema that is prominent  - PT eval appreciated: home w/ assist; home w/ home PT vs [facility]  -  family wishing acute rehab     #PVD/HLD/HTN  - c/w statin, nifedipine Imdur  - dcd plavix     #ESRD on HD  - Continue HD  - Nephrology following     #Depression  - Continue duloxetine    #Hx of GI bleed due to gastric AVMs, follows with dr. barrera as op   - Monitor h/h   - Continue PPI    DISPO: family wishes acute rehab . pt recommending  [facility] , sw/cm for dc planning .

## 2021-06-12 NOTE — PROGRESS NOTE ADULT - SUBJECTIVE AND OBJECTIVE BOX
Subjective: resting in bed    Vital Signs:  Vital Signs Last 24 Hrs  T(C): 36.8 (06-12-21 @ 08:59), Max: 37.6 (06-11-21 @ 22:20)  T(F): 98.2 (06-12-21 @ 08:59), Max: 99.6 (06-11-21 @ 22:20)  HR: 69 (06-12-21 @ 08:59) (68 - 72)  BP: 149/63 (06-12-21 @ 08:59) (147/64 - 149/65)  RR: 17 (06-12-21 @ 08:59) (17 - 18)  SpO2: 98% (06-12-21 @ 08:59) (96% - 98%) on (O2)    Relevant labs, radiology and Medications reviewed    Pertinent Physical Exam  Gen: WN/WD NAD  Neuro: A+Ox3, nonfocal  Pulm: diminished base R > L  CV: RRR  LE: no edema*    CXR: < from: Xray Chest 1 View- PORTABLE-Routine (Xray Chest 1 View- PORTABLE-Routine in AM.) (06.12.21 @ 06:03) >    Comparison: Makenna 10, 2021.    Impression: Left basilar pigtail chest tube is noted. There is an unchanged tiny left apical pneumothorax. Vague patchy airspace opacity seen within both lungs, unchanged. A loop recorder and a micra device overlie the left chest. Patient is status post TAVR. The heart size is within normal limits.    < end of copied text >    I&O's Detail    11 Jun 2021 07:01  -  12 Jun 2021 07:00  --------------------------------------------------------  IN:    Oral Fluid: 120 mL  Total IN: 120 mL    OUT:    Chest Tube (mL): 50 mL  Total OUT: 50 mL    Total NET: 70 mL      12 Jun 2021 07:01  -  12 Jun 2021 19:44  --------------------------------------------------------  IN:  Total IN: 0 mL    OUT:    Chest Tube (mL): 100 mL    Voided (mL): 100 mL  Total OUT: 200 mL    Total NET: -200 mL

## 2021-06-12 NOTE — PROGRESS NOTE ADULT - SUBJECTIVE AND OBJECTIVE BOX
CC: Lower extremity weakness and elevated trop level (11 Jun 2021 17:12)    INTERVAL HPI/OVERNIGHT EVENTS:  no acute events overnight  without complaints this morning    Vital Signs Last 24 Hrs  T(C): 36.8 (12 Jun 2021 08:59), Max: 37.6 (11 Jun 2021 22:20)  T(F): 98.2 (12 Jun 2021 08:59), Max: 99.6 (11 Jun 2021 22:20)  HR: 69 (12 Jun 2021 08:59) (68 - 74)  BP: 149/63 (12 Jun 2021 08:59) (146/57 - 174/63)  BP(mean): --  RR: 17 (12 Jun 2021 08:59) (17 - 18)  SpO2: 98% (12 Jun 2021 08:59) (96% - 100%)    EXAM:  General: NAD, calm, cooperative  Lungs: CTAB  Heart: irregular rate  Abdomen: NT, ND, soft  Ext: LLE is in ACE/kerlix wrap  Neuro: alert and oriented to person, place and time; RLE 4/5 compared to LLE which is 3/5 strength; no slurred speech noted; follows commands  Skin: intact    I&O's Detail    11 Jun 2021 07:01  -  12 Jun 2021 07:00  --------------------------------------------------------  IN:    Oral Fluid: 120 mL  Total IN: 120 mL    OUT:    Chest Tube (mL): 50 mL  Total OUT: 50 mL    Total NET: 70 mL    12 Jun 2021 07:01  -  12 Jun 2021 11:13  --------------------------------------------------------  IN:  Total IN: 0 mL    OUT:    Voided (mL): 100 mL  Total OUT: 100 mL    Total NET: -100 mL                        9.2    6.31  )-----------( 227      ( 12 Jun 2021 06:02 )             31.7     11 Jun 2021 06:07    137    |  99     |  42.8   ----------------------------<  77     5.2     |  23.0   |  4.86     Ca    8.9        11 Jun 2021 06:07    TPro  5.7    /  Alb  2.5    /  TBili  0.4    /  DBili  x      /  AST  243    /  ALT  227    /  AlkPhos  95     11 Jun 2021 06:07    CAPILLARY BLOOD GLUCOSE    LIVER FUNCTIONS - ( 11 Jun 2021 06:07 )  Alb: 2.5 g/dL / Pro: 5.7 g/dL / ALK PHOS: 95 U/L / ALT: 227 U/L / AST: 243 U/L / GGT: x           MEDICATIONS  (STANDING):  atorvastatin 80 milliGRAM(s) Oral at bedtime  chlorhexidine 2% Cloths 1 Application(s) Topical <User Schedule>  DULoxetine 60 milliGRAM(s) Oral daily  enoxaparin Injectable 65 milliGRAM(s) SubCutaneous daily  epoetin juan-epbx (RETACRIT) Injectable 75026 Unit(s) IV Push <User Schedule>  isosorbide   mononitrate ER Tablet (IMDUR) 30 milliGRAM(s) Oral daily  NIFEdipine XL 90 milliGRAM(s) Oral daily  NIFEdipine XL 60 milliGRAM(s) Oral at bedtime  pantoprazole    Tablet 40 milliGRAM(s) Oral two times a day  tamsulosin 0.4 milliGRAM(s) Oral at bedtime  torsemide 20 milliGRAM(s) Oral <User Schedule>    MEDICATIONS  (PRN):      RADIOLOGY & ADDITIONAL TESTS:

## 2021-06-12 NOTE — PROGRESS NOTE ADULT - SUBJECTIVE AND OBJECTIVE BOX
Formerly [facility], [facility]                                   [address] [place] [address]                                                      PHONE: [phone]                                                         FAX: [phone]  [email or web address]  ---------------------------------------------------------------------------------------------------------------------------------    Overnight events/patient complaints: no acute events overnight and continues to be concerned about weakness     INTERPRETATION OF TELEMETRY (personally reviewed)    ECHOCARDIOGRAM independently visualized/reviewed and demonstrated :    1. There is moderate concentric left ventricular hypertrophy.   2. Normal global left ventricular systolic function.   3. Left ventricular ejection fraction, by visual estimation, is 60 to 65%.   4. Normal right ventricular size and function.   5. Severely enlarged left atrium.   6. Moderately enlarged right atrium.   7. Bioprosthesis in the aortic position with normal gradients.   8. Moderate mitral valve regurgitation.   9. Moderate mitral annular calcification.  10. Moderate tricuspid regurgitation.  11. Estimated pulmonary artery systolic pressure is 80.6 mmHg assuming a right atrial pressure of 8 mmHg, which is consistent with severe pulmonary hypertension.  12. Moderate mitral stenosis, increased gradient partly due to tachycardia.    TELEMETRY independently visualized/reviewed and demonstrated : AF    I&O's Summary    11 Jun 2021 07:01  -  12 Jun 2021 07:00  --------------------------------------------------------  IN: 120 mL / OUT: 50 mL / NET: 70 mL    12 Jun 2021 07:01  -  12 Jun 2021 13:05  --------------------------------------------------------  IN: 0 mL / OUT: 100 mL / NET: -100 mL        PAST MEDICAL & SURGICAL HISTORY:  DM (diabetes mellitus)  - resolved    AV block, 1st degree    Arrhythmia    CAD (coronary artery disease)    HTN (hypertension)    VT (ventricular tachycardia)    RICHARDS (dyspnea on exertion)    Anemia    Risk factors for obstructive sleep apnea    ESRD on dialysis  HD on Mondays and Fridays    Aortic stenosis  - s/p valve replacement    GI bleeding  due to ulcerated polyps and colonic AVMs    H/O circumcision  at  age  65    H/O left knee surgery    H/O angioplasty  2013,  no  intervention    A-V fistula  left arm 5/2017    H/O carotid endarterectomy  Right    S/P TAVR (transcatheter aortic valve replacement)    History of loop recorder        Plavix (Hives)  Toprol-XL (Rash)    MEDICATIONS  (STANDING):  atorvastatin 80 milliGRAM(s) Oral at bedtime  chlorhexidine 2% Cloths 1 Application(s) Topical <User Schedule>  DULoxetine 60 milliGRAM(s) Oral daily  enoxaparin Injectable 65 milliGRAM(s) SubCutaneous daily  epoetin juan-epbx (RETACRIT) Injectable 45472 Unit(s) IV Push <User Schedule>  isosorbide   mononitrate ER Tablet (IMDUR) 30 milliGRAM(s) Oral daily  NIFEdipine XL 90 milliGRAM(s) Oral daily  NIFEdipine XL 60 milliGRAM(s) Oral at bedtime  pantoprazole    Tablet 40 milliGRAM(s) Oral two times a day  tamsulosin 0.4 milliGRAM(s) Oral at bedtime  torsemide 20 milliGRAM(s) Oral <User Schedule>    MEDICATIONS  (PRN):      Vital Signs Last 24 Hrs  T(C): 36.8 (12 Jun 2021 08:59), Max: 37.6 (11 Jun 2021 22:20)  T(F): 98.2 (12 Jun 2021 08:59), Max: 99.6 (11 Jun 2021 22:20)  HR: 69 (12 Jun 2021 08:59) (68 - 74)  BP: 149/63 (12 Jun 2021 08:59) (147/64 - 174/63)  BP(mean): --  RR: 17 (12 Jun 2021 08:59) (17 - 18)  SpO2: 98% (12 Jun 2021 08:59) (96% - 99%)  ICU Vital Signs Last 24 Hrs    PHYSICAL EXAM:  General: Elderly male in bed, sleepy  HEENT: Head; normocephalic, atraumatic. Pupils reactive, cornea wnl. Neck supple, no nodes adenopathy, (+) JVD  CARDIOVASCULAR: Normal S1 and S2, 1/6 DEANA, no rub, gallop or lift.   LUNGS: No rales, rhonchi or wheeze. decreased breath sounds at bilateral bases   ABDOMEN: Soft, nontender without mass or organomegaly. bowel sounds normoactive.  EXTREMITIES: No clubbing, cyanosis; (+)  L>R edema.   SKIN: warm and dry with normal turgor.  NEURO: Alert/oriented x 2/normal motor exam.         LABS:                        9.2    6.31  )-----------( 227      ( 12 Jun 2021 06:02 )             31.7     06-11    137  |  99  |  42.8<H>  ----------------------------<  77  5.2   |  23.0  |  4.86<H>    Ca    8.9      11 Jun 2021 06:07    TPro  5.7<L>  /  Alb  2.5<L>  /  TBili  0.4  /  DBili  x   /  AST  243<H>  /  ALT  227<H>  /  AlkPhos  95  06-11      RADIOLOGY & ADDITIONAL STUDIES:  < from: US Duplex Venous Lower Ext Complete, Bilateral (06.03.21 @ 14:52) >  New extension of LEFT lower extremity thrombus into the LEFT femoral vein.    ASSESSMENT AND PLAN:  In summary, CHRISTIANO ELIZABETH is a 87y Male with past medical history significant for PAF off AC due to GIBs, leadless PPM, ILR in place, nonobst CAD with normal EF, AS s/p TAVR, ESRD on HD, LLE fem-pop bypass, s/p bypass 3/20/21, post procedure developed L femoral DVT and s/p revision of LLE bypass 3/25/21 who presents with bilateral LE ext weakness with difficulty to ambulate found to have extension of DVT, low level troponin (non-ACS), MRI brain with bilateral frontal subdural collections suggesting subdural hematomas or subdural hygromas and pleural effusion s/p chest tube placement     Pleural effusion/DVT/subdural collection  - on lovenox and off antiplatelet therapy  - continue statin therapy   - diuretics on non-HD days   - pending consideration for IVC filter   - continue nifedipine and imdur for BP control  - trop elevation is not consistent with ACS, no additional inpatient work-up      Thank you for allowing [facility] to participate in the care of this patient.  Please feel free to call with any questions or concerns.

## 2021-06-12 NOTE — PROGRESS NOTE ADULT - ASSESSMENT
ESRD on HD  Left moderate pleural effusion s/p thoracentesis  LLE DVT- on heparin drip  Anemia of CKD      Continue HD MF schedule  On Lovenox- renal dosing; may have bleeding complications as Lovenox is not dialyzable- monitor H/H  Hb low, continue AMY

## 2021-06-12 NOTE — PROGRESS NOTE ADULT - SUBJECTIVE AND OBJECTIVE BOX
[facility] DIVISION OF KIDNEY DISEASES AND HYPERTENSION -- HEMODIALYSIS NOTE  --------------------------------------------------------------------------------  Chief Complaint: ESRD/Ongoing hemodialysis requirement    24 hour events/subjective:  Pt s/p HD yesterday. tolerated well      PAST HISTORY  --------------------------------------------------------------------------------  No significant changes to PMH, PSH, FHx, SHx, unless otherwise noted    ALLERGIES & MEDICATIONS  --------------------------------------------------------------------------------  Allergies    Plavix (Hives)  Toprol-XL (Rash)    Intolerances      Standing Inpatient Medications  atorvastatin 80 milliGRAM(s) Oral at bedtime  chlorhexidine 2% Cloths 1 Application(s) Topical <User Schedule>  DULoxetine 60 milliGRAM(s) Oral daily  enoxaparin Injectable 65 milliGRAM(s) SubCutaneous daily  epoetin juan-epbx (RETACRIT) Injectable 59761 Unit(s) IV Push <User Schedule>  isosorbide   mononitrate ER Tablet (IMDUR) 30 milliGRAM(s) Oral daily  NIFEdipine XL 90 milliGRAM(s) Oral daily  NIFEdipine XL 60 milliGRAM(s) Oral at bedtime  pantoprazole    Tablet 40 milliGRAM(s) Oral two times a day  tamsulosin 0.4 milliGRAM(s) Oral at bedtime  torsemide 20 milliGRAM(s) Oral <User Schedule>    PRN Inpatient Medications      REVIEW OF SYSTEMS  --------------------------------------------------------------------------------  Gen: No weight changes, fatigue, fevers/chills, weakness  Skin: No rashes  Head/Eyes/Ears/Mouth: No headache  Respiratory: No dyspnea, cough,  CV: No chest pain, orthopnea  GI: No abdominal pain, diarrhea, constipation, nausea, vomiting,  MSK: No joint pain  Neuro: No dizziness/lightheadedness, weakness  Heme: No bleeding  Psych: No significant nervousness, anxiety, stress, depression    All other systems were reviewed and are negative, except as noted.    VITALS/PHYSICAL EXAM  --------------------------------------------------------------------------------  T(C): 36.8 (06-12-21 @ 08:59), Max: 37.6 (06-11-21 @ 22:20)  HR: 69 (06-12-21 @ 08:59) (68 - 74)  BP: 149/63 (06-12-21 @ 08:59) (146/57 - 174/63)  RR: 17 (06-12-21 @ 08:59) (17 - 18)  SpO2: 98% (06-12-21 @ 08:59) (96% - 100%)  Wt(kg): --        06-11-21 @ 07:01  -  06-12-21 @ 07:00  --------------------------------------------------------  IN: 120 mL / OUT: 50 mL / NET: 70 mL    06-12-21 @ 07:01  -  06-12-21 @ 11:43  --------------------------------------------------------  IN: 0 mL / OUT: 100 mL / NET: -100 mL      Physical Exam:  	Gen: NAD,  	HEENT: PERRL, supple neck,  	Pulm: CTA B/L; pigtail catheter+  	CV: RRR, S1S2; no rub  	Abd: +BS, soft, nontender  	UE: Warm  	LE: Warm, no  edema  	Neuro: No focal deficits  	Psych: Normal affect and mood  	Skin: Warm, without rashes  	Vascular access: RUE AV    LABS/STUDIES  --------------------------------------------------------------------------------              9.2    6.31  >-----------<  227      [06-12-21 @ 06:02]              31.7     137  |  99  |  42.8  ----------------------------<  77      [06-11-21 @ 06:07]  5.2   |  23.0  |  4.86        Ca     8.9     [06-11-21 @ 06:07]    TPro  5.7  /  Alb  2.5  /  TBili  0.4  /  DBili  x   /  AST  243  /  ALT  227  /  AlkPhos  95  [06-11-21 @ 06:07]          Iron 53, TIBC 266, %sat 20      [08-19-20 @ 06:32]  Ferritin 184      [08-19-20 @ 06:32]  PTH -- (Ca 9.6)      [08-19-20 @ 16:08]   91  Vitamin D (25OH) 26.4      [08-19-20 @ 16:08]  HbA1c 4.9      [08-09-18 @ 05:54]  TSH 2.16      [09-15-20 @ 02:01]  Lipid: chol 126, , HDL 60, LDL --      [06-03-21 @ 06:07]

## 2021-06-13 LAB
HCT VFR BLD CALC: 30.3 % — LOW (ref 39–50)
HGB BLD-MCNC: 9.2 G/DL — LOW (ref 13–17)
MCHC RBC-ENTMCNC: 29.7 PG — SIGNIFICANT CHANGE UP (ref 27–34)
MCHC RBC-ENTMCNC: 30.4 GM/DL — LOW (ref 32–36)
MCV RBC AUTO: 97.7 FL — SIGNIFICANT CHANGE UP (ref 80–100)
PLATELET # BLD AUTO: 230 K/UL — SIGNIFICANT CHANGE UP (ref 150–400)
RBC # BLD: 3.1 M/UL — LOW (ref 4.2–5.8)
RBC # FLD: 18.7 % — HIGH (ref 10.3–14.5)
WBC # BLD: 7.82 K/UL — SIGNIFICANT CHANGE UP (ref 3.8–10.5)
WBC # FLD AUTO: 7.82 K/UL — SIGNIFICANT CHANGE UP (ref 3.8–10.5)

## 2021-06-13 PROCEDURE — 99233 SBSQ HOSP IP/OBS HIGH 50: CPT

## 2021-06-13 PROCEDURE — 99232 SBSQ HOSP IP/OBS MODERATE 35: CPT

## 2021-06-13 RX ORDER — ACETAMINOPHEN 500 MG
650 TABLET ORAL ONCE
Refills: 0 | Status: COMPLETED | OUTPATIENT
Start: 2021-06-13 | End: 2021-06-13

## 2021-06-13 RX ORDER — DULOXETINE HYDROCHLORIDE 30 MG/1
90 CAPSULE, DELAYED RELEASE ORAL DAILY
Refills: 0 | Status: DISCONTINUED | OUTPATIENT
Start: 2021-06-13 | End: 2021-06-21

## 2021-06-13 RX ADMIN — Medication 90 MILLIGRAM(S): at 05:39

## 2021-06-13 RX ADMIN — ATORVASTATIN CALCIUM 80 MILLIGRAM(S): 80 TABLET, FILM COATED ORAL at 21:37

## 2021-06-13 RX ADMIN — CHLORHEXIDINE GLUCONATE 1 APPLICATION(S): 213 SOLUTION TOPICAL at 05:51

## 2021-06-13 RX ADMIN — TAMSULOSIN HYDROCHLORIDE 0.4 MILLIGRAM(S): 0.4 CAPSULE ORAL at 21:37

## 2021-06-13 RX ADMIN — Medication 20 MILLIGRAM(S): at 10:50

## 2021-06-13 RX ADMIN — Medication 60 MILLIGRAM(S): at 21:37

## 2021-06-13 RX ADMIN — ENOXAPARIN SODIUM 65 MILLIGRAM(S): 100 INJECTION SUBCUTANEOUS at 21:36

## 2021-06-13 RX ADMIN — PANTOPRAZOLE SODIUM 40 MILLIGRAM(S): 20 TABLET, DELAYED RELEASE ORAL at 17:46

## 2021-06-13 RX ADMIN — Medication 650 MILLIGRAM(S): at 06:00

## 2021-06-13 RX ADMIN — Medication 650 MILLIGRAM(S): at 05:36

## 2021-06-13 RX ADMIN — ISOSORBIDE MONONITRATE 30 MILLIGRAM(S): 60 TABLET, EXTENDED RELEASE ORAL at 10:51

## 2021-06-13 RX ADMIN — PANTOPRAZOLE SODIUM 40 MILLIGRAM(S): 20 TABLET, DELAYED RELEASE ORAL at 05:36

## 2021-06-13 RX ADMIN — DULOXETINE HYDROCHLORIDE 60 MILLIGRAM(S): 30 CAPSULE, DELAYED RELEASE ORAL at 10:51

## 2021-06-13 NOTE — PROGRESS NOTE ADULT - ASSESSMENT
86M multiple medical conditions on heparin gtt for LLE DVT f/w incidental finding of L pleural effusion on MRI thoracic spine confirmed with CTA chest.  PT stable NAD. 6/7 pigtail cath placed left chest with 1400 cc drainage.    6/8 pt transitioned from heparin gtt to full dose therapeutic Lovenox  6/9 pigtail cath continues to drain, will maintain.

## 2021-06-13 NOTE — PROGRESS NOTE ADULT - SUBJECTIVE AND OBJECTIVE BOX
SUBJECTIVE:  Pt in bed sleeping but arousable, Pt states, " My feet hurt".  Pt c/o of B/L feet hurting. Pt denies Chest pain, Palpitations, SOB, and DIzziness    Overnight events:  none      PAST MEDICAL & SURGICAL HISTORY:  DM (diabetes mellitus)  - resolved    AV block, 1st degree    Arrhythmia    CAD (coronary artery disease)    HTN (hypertension)    VT (ventricular tachycardia)    RICHARDS (dyspnea on exertion)    Anemia    Risk factors for obstructive sleep apnea    ESRD on dialysis  HD on  and     Aortic stenosis  - s/p valve replacement    GI bleeding  due to ulcerated polyps and colonic AVMs    H/O circumcision  at  age  65    H/O left knee surgery    H/O angioplasty  ,  no  intervention    A-V fistula  left arm 2017    H/O carotid endarterectomy  Right    S/P TAVR (transcatheter aortic valve replacement)    History of loop recorder          atorvastatin 80 milliGRAM(s) Oral at bedtime  chlorhexidine 2% Cloths 1 Application(s) Topical <User Schedule>  DULoxetine 90 milliGRAM(s) Oral daily  enoxaparin Injectable 65 milliGRAM(s) SubCutaneous daily  epoetin juan-epbx (RETACRIT) Injectable 11177 Unit(s) IV Push <User Schedule>  isosorbide   mononitrate ER Tablet (IMDUR) 30 milliGRAM(s) Oral daily  NIFEdipine XL 90 milliGRAM(s) Oral daily  NIFEdipine XL 60 milliGRAM(s) Oral at bedtime  pantoprazole    Tablet 40 milliGRAM(s) Oral two times a day  tamsulosin 0.4 milliGRAM(s) Oral at bedtime  torsemide 20 milliGRAM(s) Oral <User Schedule>  MEDICATIONS  (PRN):      Daily     Daily Weight in k.6 (2021 05:12)                              9.2    7.82  )-----------( 230      ( 2021 06:17 )             30.3                 Objective:  T(C): 36.6 (21 @ 17:30), Max: 38.1 (21 @ 05:12)  HR: 88 (21 @ 15:30) (75 - 88)  BP: 171/74 (21 @ 15:30) (144/62 - 171/74)  RR: 18 (21 @ 10:43) (18 - 18)  SpO2: 99% (21 @ 15:30) (96% - 100%)  Wt(kg): --CAPILLARY BLOOD GLUCOSE      I&O's Summary    2021 07:  -  2021 07:00  --------------------------------------------------------  IN: 0 mL / OUT: 200 mL / NET: -200 mL    2021 07:01  -  2021 18:35  --------------------------------------------------------  IN: 360 mL / OUT: 0 mL / NET: 360 mL        Physical Exam  Neuro: A+O x 3, non-focal, speech clear and intact  Pulm: CTA, equal bilaterally  CV: RRR,, +S1S2  Abd: soft, NT, ND, +BS  Ext: +DP Pulses b/l, +PT pulses, no edema  Chest tubes:    Imaging:  CXR:    < from: Xray Chest 1 View- PORTABLE-Routine (Xray Chest 1 View- PORTABLE-Routine in AM.) (21 @ 06:03) >    AP view of the chest was obtained.    Comparison: Makenna 10, 2021.    Impression: Left basilar pigtail chest tube is noted. There is an unchanged tiny left apical pneumothorax. Vague patchy airspace opacity seen within both lungs, unchanged. A loop recorder and a micra device overlie the left chest. Patient is status post TAVR. The heart size is within normal limits.    < end of copied text >    ECG:    < from: 12 Lead ECG (21 @ 12:12) >  entricular Rate 82 BPM    Atrial Rate 82 BPM    P-R Interval 268 ms    QRS Duration 88 ms    Q-T Interval 400 ms    QTC Calculation(Bazett) 467 ms    P Axis 90 degrees    R Axis -17 degrees    T Axis 44 degrees    Diagnosis Line *** Poor data quality, interpretation may be adversely affected  Sinus rhythm with 1st degree A-V block  Otherwise normal ECG      < end of copied text >

## 2021-06-13 NOTE — PROGRESS NOTE ADULT - SUBJECTIVE AND OBJECTIVE BOX
CC: Lower extremity weakness and elevated trop level (11 Jun 2021 17:12)    INTERVAL HPI/OVERNIGHT EVENTS:  no acute events overnight  without complaints this morning    Vital Signs Last 24 Hrs  T(C): 36.6 (13 Jun 2021 10:43), Max: 38.1 (13 Jun 2021 05:12)  T(F): 97.9 (13 Jun 2021 10:43), Max: 100.6 (13 Jun 2021 05:12)  HR: 79 (13 Jun 2021 10:43) (75 - 79)  BP: 144/62 (13 Jun 2021 10:43) (144/62 - 168/92)  BP(mean): 89 (13 Jun 2021 10:43) (89 - 89)  RR: 18 (13 Jun 2021 10:43) (18 - 18)  SpO2: 100% (13 Jun 2021 10:43) (96% - 100%)    EXAM:  General: NAD, calm, cooperative  Lungs: CTAB  Heart: irregular rate  Abdomen: NT, ND, soft  Ext: LLE is in ACE/kerlix wrap  Neuro: alert and oriented to person, place and time; RLE 4/5 compared to LLE which is 3/5 strength; no slurred speech noted; follows commands  Skin: intact    I&O's Detail    11 Jun 2021 07:01  -  12 Jun 2021 07:00  --------------------------------------------------------  IN:    Oral Fluid: 120 mL  Total IN: 120 mL    OUT:    Chest Tube (mL): 50 mL  Total OUT: 50 mL    Total NET: 70 mL    12 Jun 2021 07:01  -  12 Jun 2021 11:13  --------------------------------------------------------  IN:  Total IN: 0 mL    OUT:    Voided (mL): 100 mL  Total OUT: 100 mL    Total NET: -100 mL                        9.2    6.31  )-----------( 227      ( 12 Jun 2021 06:02 )             31.7     11 Jun 2021 06:07    137    |  99     |  42.8   ----------------------------<  77     5.2     |  23.0   |  4.86     Ca    8.9        11 Jun 2021 06:07    TPro  5.7    /  Alb  2.5    /  TBili  0.4    /  DBili  x      /  AST  243    /  ALT  227    /  AlkPhos  95     11 Jun 2021 06:07    CAPILLARY BLOOD GLUCOSE    LIVER FUNCTIONS - ( 11 Jun 2021 06:07 )  Alb: 2.5 g/dL / Pro: 5.7 g/dL / ALK PHOS: 95 U/L / ALT: 227 U/L / AST: 243 U/L / GGT: x           MEDICATIONS  (STANDING):  atorvastatin 80 milliGRAM(s) Oral at bedtime  chlorhexidine 2% Cloths 1 Application(s) Topical <User Schedule>  DULoxetine 60 milliGRAM(s) Oral daily  enoxaparin Injectable 65 milliGRAM(s) SubCutaneous daily  epoetin juan-epbx (RETACRIT) Injectable 83937 Unit(s) IV Push <User Schedule>  isosorbide   mononitrate ER Tablet (IMDUR) 30 milliGRAM(s) Oral daily  NIFEdipine XL 90 milliGRAM(s) Oral daily  NIFEdipine XL 60 milliGRAM(s) Oral at bedtime  pantoprazole    Tablet 40 milliGRAM(s) Oral two times a day  tamsulosin 0.4 milliGRAM(s) Oral at bedtime  torsemide 20 milliGRAM(s) Oral <User Schedule>    MEDICATIONS  (PRN):    RADIOLOGY & ADDITIONAL TESTS:

## 2021-06-13 NOTE — PROGRESS NOTE ADULT - ASSESSMENT
85 y/o M with PMHx of HTN, HLD, CAD, HFpEF, s/p Right CEA for Carotid artery disease, ESRD on HD 2d/week (M/Fri) via LUE fistula, s/p flecainide w/ ILR placed 6/2020, AS s/p TAVR, and PAD (RLE fem-pop bypass October 2020) multiple gastric AVM's s/p cauterized s/p LLE fem-pop bypass, and had the bypass performed on 3/20/21, post procedure developed L femoral DVT and s/p revision of LLE bypass 3/25/21 brought to ED with cc of b/l LE ext weakness with difficulty to ambulate for past 2 days.      # B/l LE weakness/ -Symptoms likely secondary to progressing/extensive DVT (Duplex: New extension of LEFT lower extremity thrombus into the LEFT femoral vein)  - Acute CVA ruled  out  - CT head negative for acute pathology  - MRI brain: Trace bilateral frontal subdural collections suggesting subdural hematomas or subdural hygromas.  - Seen in consult by neuro and neurosurgical teams  - MRI C/T/L spine ordered by neurosurgery, no acute actionable findings   - Continue Lipitor 80mg po qhs   - dc  Plavix as per  vascular team due to high bleeding risk    - changed to renal dosed lovenox    #Left large pleural effusion - transudative; first identified on MRI spine and followed up with CT chest which confirmed  - s/p drainage 6/7 , pluerex cath likely to be dcd by ct surgery prior to dc to rehab  - currently on waterseal with plans for CXR today - recs from CT surgery to follow    #Left Lower Extremity DVT  - B/L LE edema, L>R  - Venous duplex b/l LE: New extension of LLE thrombus into L femoral vein  - MRI head with subdural collection suggestive of subdural hematoma or hygroma  - MRI spine with no acute actionable findings  - Vascular surgery consult appreciated: no IVC filter recommended at this time   - renal dose lovenox  - dc plavix     #Elevated Troponin levels in setting of ESRD  - Asymptomatic   - EKG: NSR, rate 82/min, first degree AV block   - TTE w/o WMA  -  Continue telemetry monitoring   - Cardiology consult appreciated, no further recs/work up     #Generalized weakness, leg weakness     - Likely due to LLE edema that is prominent  - PT eval appreciated: home w/ assist; home w/ home PT vs [facility]  -  family wishing acute rehab     #PVD/HLD/HTN  - c/w statin, nifedipine Imdur  - dcd plavix     #ESRD on HD  - Continue HD  - Nephrology following     #Depression  - Continue duloxetine    #Hx of GI bleed due to gastric AVMs, follows with dr. barrera as op   - Monitor h/h   - Continue PPI    DISPO: family wishes acute rehab . pending auth for AR.

## 2021-06-13 NOTE — PROGRESS NOTE ADULT - ASSESSMENT
ESRD on HD  Left moderate pleural effusion s/p thoracentesis  LLE DVT  Anemia of CKD      Continue HD MF schedule  On Lovenox- renal dosing; may have bleeding complications as Lovenox is not dialyzable- monitor H/H  Hb low, continue AMY

## 2021-06-14 LAB
ANION GAP SERPL CALC-SCNC: 13 MMOL/L — SIGNIFICANT CHANGE UP (ref 5–17)
APTT BLD: 41.6 SEC — HIGH (ref 27.5–35.5)
BUN SERPL-MCNC: 44.7 MG/DL — HIGH (ref 8–20)
CALCIUM SERPL-MCNC: 9 MG/DL — SIGNIFICANT CHANGE UP (ref 8.6–10.2)
CHLORIDE SERPL-SCNC: 97 MMOL/L — LOW (ref 98–107)
CO2 SERPL-SCNC: 27 MMOL/L — SIGNIFICANT CHANGE UP (ref 22–29)
CREAT SERPL-MCNC: 4.14 MG/DL — HIGH (ref 0.5–1.3)
GLUCOSE SERPL-MCNC: 101 MG/DL — HIGH (ref 70–99)
HCT VFR BLD CALC: 29.5 % — LOW (ref 39–50)
HGB BLD-MCNC: 8.8 G/DL — LOW (ref 13–17)
INR BLD: 1.13 RATIO — SIGNIFICANT CHANGE UP (ref 0.88–1.16)
MAGNESIUM SERPL-MCNC: 1.8 MG/DL — SIGNIFICANT CHANGE UP (ref 1.6–2.6)
MCHC RBC-ENTMCNC: 29 PG — SIGNIFICANT CHANGE UP (ref 27–34)
MCHC RBC-ENTMCNC: 29.8 GM/DL — LOW (ref 32–36)
MCV RBC AUTO: 97.4 FL — SIGNIFICANT CHANGE UP (ref 80–100)
PLATELET # BLD AUTO: 244 K/UL — SIGNIFICANT CHANGE UP (ref 150–400)
POTASSIUM SERPL-MCNC: 4.4 MMOL/L — SIGNIFICANT CHANGE UP (ref 3.5–5.3)
POTASSIUM SERPL-SCNC: 4.4 MMOL/L — SIGNIFICANT CHANGE UP (ref 3.5–5.3)
PROTHROM AB SERPL-ACNC: 13 SEC — SIGNIFICANT CHANGE UP (ref 10.6–13.6)
RBC # BLD: 3.03 M/UL — LOW (ref 4.2–5.8)
RBC # FLD: 18.6 % — HIGH (ref 10.3–14.5)
SODIUM SERPL-SCNC: 137 MMOL/L — SIGNIFICANT CHANGE UP (ref 135–145)
WBC # BLD: 6.42 K/UL — SIGNIFICANT CHANGE UP (ref 3.8–10.5)
WBC # FLD AUTO: 6.42 K/UL — SIGNIFICANT CHANGE UP (ref 3.8–10.5)

## 2021-06-14 PROCEDURE — 99232 SBSQ HOSP IP/OBS MODERATE 35: CPT

## 2021-06-14 PROCEDURE — 71045 X-RAY EXAM CHEST 1 VIEW: CPT | Mod: 26,76

## 2021-06-14 PROCEDURE — 99233 SBSQ HOSP IP/OBS HIGH 50: CPT

## 2021-06-14 PROCEDURE — 90937 HEMODIALYSIS REPEATED EVAL: CPT

## 2021-06-14 RX ORDER — ACETAMINOPHEN 500 MG
650 TABLET ORAL EVERY 6 HOURS
Refills: 0 | Status: DISCONTINUED | OUTPATIENT
Start: 2021-06-14 | End: 2021-06-21

## 2021-06-14 RX ADMIN — ENOXAPARIN SODIUM 65 MILLIGRAM(S): 100 INJECTION SUBCUTANEOUS at 21:52

## 2021-06-14 RX ADMIN — PANTOPRAZOLE SODIUM 40 MILLIGRAM(S): 20 TABLET, DELAYED RELEASE ORAL at 18:00

## 2021-06-14 RX ADMIN — TAMSULOSIN HYDROCHLORIDE 0.4 MILLIGRAM(S): 0.4 CAPSULE ORAL at 21:52

## 2021-06-14 RX ADMIN — Medication 20 MILLIGRAM(S): at 09:15

## 2021-06-14 RX ADMIN — ERYTHROPOIETIN 10000 UNIT(S): 10000 INJECTION, SOLUTION INTRAVENOUS; SUBCUTANEOUS at 13:25

## 2021-06-14 RX ADMIN — PANTOPRAZOLE SODIUM 40 MILLIGRAM(S): 20 TABLET, DELAYED RELEASE ORAL at 05:58

## 2021-06-14 RX ADMIN — ISOSORBIDE MONONITRATE 30 MILLIGRAM(S): 60 TABLET, EXTENDED RELEASE ORAL at 09:15

## 2021-06-14 RX ADMIN — Medication 650 MILLIGRAM(S): at 18:32

## 2021-06-14 RX ADMIN — Medication 650 MILLIGRAM(S): at 02:58

## 2021-06-14 RX ADMIN — DULOXETINE HYDROCHLORIDE 90 MILLIGRAM(S): 30 CAPSULE, DELAYED RELEASE ORAL at 09:15

## 2021-06-14 RX ADMIN — ATORVASTATIN CALCIUM 80 MILLIGRAM(S): 80 TABLET, FILM COATED ORAL at 21:52

## 2021-06-14 RX ADMIN — Medication 90 MILLIGRAM(S): at 05:58

## 2021-06-14 RX ADMIN — CHLORHEXIDINE GLUCONATE 1 APPLICATION(S): 213 SOLUTION TOPICAL at 05:58

## 2021-06-14 RX ADMIN — Medication 650 MILLIGRAM(S): at 18:02

## 2021-06-14 RX ADMIN — Medication 650 MILLIGRAM(S): at 03:33

## 2021-06-14 RX ADMIN — Medication 60 MILLIGRAM(S): at 21:52

## 2021-06-14 NOTE — PROGRESS NOTE ADULT - SUBJECTIVE AND OBJECTIVE BOX
[facility] DIVISION OF KIDNEY DISEASES AND HYPERTENSION -- FOLLOW UP NOTE  --------------------------------------------------------------------------------  Chief Complaint: esrd on hd    24 hour events/subjective:  Pt seen and examined  no acute complaint        PAST HISTORY  --------------------------------------------------------------------------------  No significant changes to PMH, PSH, FHx, SHx, unless otherwise noted    ALLERGIES & MEDICATIONS  --------------------------------------------------------------------------------  Allergies    Plavix (Hives)  Toprol-XL (Rash)    Intolerances      Standing Inpatient Medications  atorvastatin 80 milliGRAM(s) Oral at bedtime  chlorhexidine 2% Cloths 1 Application(s) Topical <User Schedule>  DULoxetine 90 milliGRAM(s) Oral daily  enoxaparin Injectable 65 milliGRAM(s) SubCutaneous daily  epoetin juan-epbx (RETACRIT) Injectable 18014 Unit(s) IV Push <User Schedule>  isosorbide   mononitrate ER Tablet (IMDUR) 30 milliGRAM(s) Oral daily  NIFEdipine XL 90 milliGRAM(s) Oral daily  NIFEdipine XL 60 milliGRAM(s) Oral at bedtime  pantoprazole    Tablet 40 milliGRAM(s) Oral two times a day  tamsulosin 0.4 milliGRAM(s) Oral at bedtime  torsemide 20 milliGRAM(s) Oral <User Schedule>    PRN Inpatient Medications  acetaminophen   Tablet .. 650 milliGRAM(s) Oral every 6 hours PRN      REVIEW OF SYSTEMS  --------------------------------------------------------------------------------  Gen: No weight changes, fatigue, fevers/chills, weakness  Skin: No rashes  Head/Eyes/Ears/Mouth: No headache; Normal hearing; Normal vision w/o blurriness; No sinus pain/discomfort, sore throat  Respiratory: No dyspnea, cough, wheezing, hemoptysis  CV: No chest pain, PND, orthopnea  GI: No abdominal pain, diarrhea, constipation, nausea, vomiting, melena, hematochezia  : No increased frequency, dysuria, hematuria, nocturia  MSK: No joint pain/swelling; no back pain; no edema  Neuro: No dizziness/lightheadedness, weakness, seizures, numbness, tingling  Heme: No easy bruising or bleeding  Endo: No heat/cold intolerance  Psych: No significant nervousness, anxiety, stress, depression    All other systems were reviewed and are negative, except as noted.    VITALS/PHYSICAL EXAM  --------------------------------------------------------------------------------  T(C): 36.9 (06-14-21 @ 15:06), Max: 37.3 (06-13-21 @ 21:34)  HR: 72 (06-14-21 @ 15:06) (71 - 78)  BP: 134/56 (06-14-21 @ 15:06) (131/52 - 180/67)  RR: 18 (06-14-21 @ 15:06) (17 - 18)  SpO2: 100% (06-14-21 @ 15:06) (95% - 100%)  Wt(kg): --        06-13-21 @ 07:01  -  06-14-21 @ 07:00  --------------------------------------------------------  IN: 360 mL / OUT: 70 mL / NET: 290 mL      Physical Exam:  	Gen: NAD,  	HEENT:, supple neck, clear oropharynx  	Pulm: CTA B/L  	CV: RRR, S1S2; no rub  	Back: No spinal or CVA tenderness; no sacral edema  	Abd: +BS, soft, nontender/nondistended  	: No suprapubic tenderness  	UE: Warm no edema; no asterixis  	LE: Warm, ; no edema  	Neuro: No focal deficit  	Psych: Normal affect and mood  	Skin: Warm, without rashes  	Vascular access: AVF    LABS/STUDIES  --------------------------------------------------------------------------------              8.8    6.42  >-----------<  244      [06-14-21 @ 05:56]              29.5     137  |  97  |  44.7  ----------------------------<  101      [06-14-21 @ 05:56]  4.4   |  27.0  |  4.14        Ca     9.0     [06-14-21 @ 05:56]      Mg     1.8     [06-14-21 @ 05:56]      PT/INR: PT 13.0 , INR 1.13       [06-14-21 @ 05:56]  PTT: 41.6       [06-14-21 @ 05:56]      Creatinine Trend:  SCr 4.14 [06-14 @ 05:56]  SCr 4.86 [06-11 @ 06:07]  SCr 4.55 [06-10 @ 06:22]  SCr 3.80 [06-09 @ 06:01]  SCr 2.95 [06-08 @ 08:42]    Urinalysis - [06-04-21 @ 07:54]      Color Yellow / Appearance Clear / SG 1.010 / pH 8.0      Gluc Negative / Ketone Negative  / Bili Negative / Urobili Negative       Blood Large / Protein 100 / Leuk Est Negative / Nitrite Negative      RBC 3-5 / WBC 3-5 / Hyaline  / Gran  / Sq Epi  / Non Sq Epi Negative / Bacteria Negative      Iron 53, TIBC 266, %sat 20      [08-19-20 @ 06:32]  Ferritin 184      [08-19-20 @ 06:32]  PTH -- (Ca 9.6)      [08-19-20 @ 16:08]   91  Vitamin D (25OH) 26.4      [08-19-20 @ 16:08]  HbA1c 4.9      [08-09-18 @ 05:54]  TSH 2.16      [09-15-20 @ 02:01]  Lipid: chol 126, , HDL 60, LDL --      [06-03-21 @ 06:07]

## 2021-06-14 NOTE — PROGRESS NOTE ADULT - SUBJECTIVE AND OBJECTIVE BOX
Patient feels fatigued.  Daughter Vanessa in room.  Discussed pending plans.   Continues to work with PT while waiting for auth.   Continues to have chest tube. Drainage seems to be improving.     REVIEW OF SYSTEMS  Constitutional - No fever,  +fatigue  HEENT - No vertigo, No neck pain  Neurological - No headaches, +loss of strength   Psychiatric - +depression, No anxiety    FUNCTIONAL PROGRESS  6/13  Bed Mobility  Bed Mobility Training Sit-to-Supine: unable to perform;  pt found left OOB in recliner with all lines intact   Bed Mobility Training Supine-to-Sit: moderate assist (50% patient effort);  1 person assist;  verbal cues  Bed Mobility Training Limitations: decreased ability to use legs for bridging/pushing;  decreased ability to use arms for pushing/pulling;  decreased strength;  impaired balance;  pain    Bed-Chair Transfer Training  Transfer Training Bed-to-Chair Transfer: maximum assist (25% patient effort);  2 person assist;  full weight-bearing   PT + PT aide assist;  stand pivot transfer   Transfer Training Chair-to-Bed Transfer: unable to perform;  pt left OOB in recliner with all lines intact  Bed-to-Chair Transfer Training Transfer Safety Analysis: decreased balance;  decreased step length;  decreased weight-shifting ability;  decreased flexibility;  decreased strength;  impaired balance;  pain    Sit-Stand Transfer Training  Transfer Training Sit-to-Stand Transfer: moderate assist (50% patient effort);  2 person assist;  full weight-bearing   rolling walker;  pt performed transfer twice, pt demonstrates a decrease in hip extension and reports increase in bilat LE pain with weight bearing.   Transfer Training Stand-to-Sit Transfer: moderate assist (50% patient effort);  2 person assist;  full weight-bearing  Sit-to-Stand Transfer Training Transfer Safety Analysis: decreased balance;  decreased step length;  decreased weight-shifting ability;  decreased strength;  impaired balance;  pain      VITALS  T(C): 36.7 (06-14-21 @ 05:54), Max: 37.3 (06-13-21 @ 21:34)  HR: 71 (06-14-21 @ 05:54) (71 - 88)  BP: 165/66 (06-14-21 @ 05:54) (144/62 - 180/67)  RR: 17 (06-14-21 @ 05:54) (17 - 18)  SpO2: 96% (06-14-21 @ 05:54) (95% - 100%)  Wt(kg): --    MEDICATIONS   acetaminophen   Tablet .. 650 milliGRAM(s) every 6 hours PRN  atorvastatin 80 milliGRAM(s) at bedtime  chlorhexidine 2% Cloths 1 Application(s) <User Schedule>  DULoxetine 90 milliGRAM(s) daily  enoxaparin Injectable 65 milliGRAM(s) daily  epoetin juan-epbx (RETACRIT) Injectable 82523 Unit(s) <User Schedule>  isosorbide   mononitrate ER Tablet (IMDUR) 30 milliGRAM(s) daily  NIFEdipine XL 90 milliGRAM(s) daily  NIFEdipine XL 60 milliGRAM(s) at bedtime  pantoprazole    Tablet 40 milliGRAM(s) two times a day  tamsulosin 0.4 milliGRAM(s) at bedtime  torsemide 20 milliGRAM(s) <User Schedule>      RECENT LABS/IMAGING                          8.8    6.42  )-----------( 244      ( 14 Jun 2021 05:56 )             29.5     06-14    137  |  97<L>  |  44.7<H>  ----------------------------<  101<H>  4.4   |  27.0  |  4.14<H>    Ca    9.0      14 Jun 2021 05:56  Mg     1.8     06-14      PT/INR - ( 14 Jun 2021 05:56 )   PT: 13.0 sec;   INR: 1.13 ratio         PTT - ( 14 Jun 2021 05:56 )  PTT:41.6 sec            MRI HEAD 6/3 - 1.  No acute infarct. 2.  Trace bilateral frontal subdural collections suggesting subdural hematomas or subdural hygromas.    BLE DOPPLER 6/3 - New extension of LEFT lower extremity thrombus into the LEFT femoral vein.    MRI Thoracic spine 6/4 1.  No evidence of epidural collection. 2.  There are T1, T2, and STIR hyperintense lesions in the T10 vertebral body and left T3 pedicle. These are nonspecific but could represent atypical hemangiomas. 3.  Large left pleural effusion. Consider CT chest.    MRI Lumbar spine 6/4 - 1.  No evidence of epidural collection. 2.  Lumbar degenerative changes. At L4-L5, there is severe spinal canal narrowing. 3.  T2 and STIR hyperintense lesions are noted in the L3 and L5 vertebral bodies, which are nonspecific but could represent atypical hemangiomas.    CT CHEST 6/6 - Moderate left pleural effusion with complete collapse left lower lobe and partial collapse lingula.    CXR 6/7 - Moderate left pleural effusion with chest tube in place and no pneumothorax. Small right pleural effusion, better characterized on CT.    CXR 6/8 - LEFT chest tube in place. Clearing of LEFT basilar airspace disease and effusion..    CXR 6/9 - Small left pleural effusion, improved    CXR 6/9 - No pleural effusion. Increased perihilar markings may represent infiltrate in the correct clinical context    CXR 6/12 - Left basilar pigtail chest tube is noted. There is an unchanged tiny left apical pneumothorax. Vague patchy airspace opacity seen within both lungs, unchanged. A loop recorder and a micra device overlie the left chest. Patient is status post TAVR. The heart size is within normal limits.  ----------------------------------------------------------------------------------------  PHYSICAL EXAM  Constitutional - NAD, Comfortable  Extremities - Left LE swelling - pitting +1  Neurologic Exam -         Motor -  As of 6/11:                    LEFT    UE - ShAB 4/5, EF 5/5, EE 5/5, WE 5/5,  5/5                    RIGHT UE - ShAB 4/5, EF 5/5, EE 5/5, WE 5/5,  5/5                    LEFT    LE - HF 3/5, KE 4/5, DF 2/5                     RIGHT LE - HF 4/5, KE 4/5, DF 4/5      Sensory - Intact to LT  Psychiatric - Fatigued  ----------------------------------------------------------------------------------------  ASSESSMENT/PLAN  87yMale with functional deficits after developing BLE weakness  Left LE DVT extension - Lovenox  CAD - Lipitor  CHF - Demadex  Pleural Effusion - Chest Tube x1  HTN - Imdur, Procardia  ESRD - HD  Pain - Tylenol, Cymbalta  DVT PPX - SCDs  Rehab - Plan is to pursue [facility] pending auth. CM aware. Discussed with daughter.    Continue bedside therapy as well as OOB throughout the day with mobilization by staff to maintain cardiopulmonary function and prevention of secondary complications related to debility.     Discussed with rehab team.

## 2021-06-14 NOTE — PROGRESS NOTE ADULT - ASSESSMENT
86M multiple medical conditions on heparin gtt for LLE DVT f/w incidental finding of L pleural effusion on MRI thoracic spine confirmed with CTA chest.  PT stable NAD. 6/7 pigtail cath placed left chest with 1400 cc drainage.    6/8 pt transitioned from heparin gtt to full dose therapeutic Lovenox  6/9 pigtail cath continues to drain, will maintain.   6/14 left pigtail d/c'd

## 2021-06-14 NOTE — PROGRESS NOTE ADULT - SUBJECTIVE AND OBJECTIVE BOX
CC: Lower extremity weakness and elevated trop level (11 Jun 2021 17:12)    INTERVAL HPI/OVERNIGHT EVENTS:  no acute events overnight  without complaints this morning     Vital Signs Last 24 Hrs  T(C): 36.6 (14 Jun 2021 11:05), Max: 37.3 (13 Jun 2021 21:34)  T(F): 97.9 (14 Jun 2021 11:05), Max: 99.1 (13 Jun 2021 21:34)  HR: 78 (14 Jun 2021 11:05) (71 - 88)  BP: 131/52 (14 Jun 2021 11:05) (131/52 - 180/67)  BP(mean): 81 (14 Jun 2021 09:09) (81 - 81)  RR: 18 (14 Jun 2021 11:05) (17 - 18)  SpO2: 98% (14 Jun 2021 11:05) (95% - 99%)    EXAM:  General: NAD, calm, cooperative  Lungs: CTAB, chest tube in place on the right  Heart: irregular rate  Abdomen: NT, ND, soft  Neuro: alert and oriented to person, place and time; RLE 4/5 compared to LLE which is 3/5 strength; no slurred speech noted; follows commands  Skin: intact    I&O's Detail    11 Jun 2021 07:01  -  12 Jun 2021 07:00  --------------------------------------------------------  IN:    Oral Fluid: 120 mL  Total IN: 120 mL    OUT:    Chest Tube (mL): 50 mL  Total OUT: 50 mL    Total NET: 70 mL    12 Jun 2021 07:01  -  12 Jun 2021 11:13  --------------------------------------------------------  IN:  Total IN: 0 mL    OUT:    Voided (mL): 100 mL  Total OUT: 100 mL    Total NET: -100 mL                        9.2    6.31  )-----------( 227      ( 12 Jun 2021 06:02 )             31.7     11 Jun 2021 06:07    137    |  99     |  42.8   ----------------------------<  77     5.2     |  23.0   |  4.86     Ca    8.9        11 Jun 2021 06:07    TPro  5.7    /  Alb  2.5    /  TBili  0.4    /  DBili  x      /  AST  243    /  ALT  227    /  AlkPhos  95     11 Jun 2021 06:07    CAPILLARY BLOOD GLUCOSE    LIVER FUNCTIONS - ( 11 Jun 2021 06:07 )  Alb: 2.5 g/dL / Pro: 5.7 g/dL / ALK PHOS: 95 U/L / ALT: 227 U/L / AST: 243 U/L / GGT: x           MEDICATIONS  (STANDING):  atorvastatin 80 milliGRAM(s) Oral at bedtime  chlorhexidine 2% Cloths 1 Application(s) Topical <User Schedule>  DULoxetine 60 milliGRAM(s) Oral daily  enoxaparin Injectable 65 milliGRAM(s) SubCutaneous daily  epoetin juan-epbx (RETACRIT) Injectable 80158 Unit(s) IV Push <User Schedule>  isosorbide   mononitrate ER Tablet (IMDUR) 30 milliGRAM(s) Oral daily  NIFEdipine XL 90 milliGRAM(s) Oral daily  NIFEdipine XL 60 milliGRAM(s) Oral at bedtime  pantoprazole    Tablet 40 milliGRAM(s) Oral two times a day  tamsulosin 0.4 milliGRAM(s) Oral at bedtime  torsemide 20 milliGRAM(s) Oral <User Schedule>    MEDICATIONS  (PRN):    RADIOLOGY & ADDITIONAL TESTS:

## 2021-06-14 NOTE — PROGRESS NOTE ADULT - SUBJECTIVE AND OBJECTIVE BOX
SUBJECTIVE:   Pt in bed just returning from HD, Daughter at the bedside.   Pt states, " Hey how you doing?"  Patient denies acute pain with radiating or aggravating factors.  He denies chest pain, shortness of breath, palpitations, headache, dizziness, nausea, or vomiting.      Overnight events:  none    PAST MEDICAL & SURGICAL HISTORY:  DM (diabetes mellitus)  - resolved    AV block, 1st degree    Arrhythmia    CAD (coronary artery disease)    HTN (hypertension)    VT (ventricular tachycardia)    RICHARDS (dyspnea on exertion)    Anemia    Risk factors for obstructive sleep apnea    ESRD on dialysis  HD on  and     Aortic stenosis  - s/p valve replacement    GI bleeding  due to ulcerated polyps and colonic AVMs    H/O circumcision  at  age  65    H/O left knee surgery    H/O angioplasty  ,  no  intervention    A-V fistula  left arm 2017    H/O carotid endarterectomy  Right    S/P TAVR (transcatheter aortic valve replacement)    History of loop recorder          acetaminophen   Tablet .. 650 milliGRAM(s) Oral every 6 hours PRN  atorvastatin 80 milliGRAM(s) Oral at bedtime  chlorhexidine 2% Cloths 1 Application(s) Topical <User Schedule>  DULoxetine 90 milliGRAM(s) Oral daily  enoxaparin Injectable 65 milliGRAM(s) SubCutaneous daily  epoetin juan-epbx (RETACRIT) Injectable 60473 Unit(s) IV Push <User Schedule>  isosorbide   mononitrate ER Tablet (IMDUR) 30 milliGRAM(s) Oral daily  NIFEdipine XL 90 milliGRAM(s) Oral daily  NIFEdipine XL 60 milliGRAM(s) Oral at bedtime  pantoprazole    Tablet 40 milliGRAM(s) Oral two times a day  tamsulosin 0.4 milliGRAM(s) Oral at bedtime  torsemide 20 milliGRAM(s) Oral <User Schedule>  MEDICATIONS  (PRN):  acetaminophen   Tablet .. 650 milliGRAM(s) Oral every 6 hours PRN Temp greater or equal to 38C (100.4F), Mild Pain (1 - 3)      Daily     Daily Weight in k.9 (2021 11:05)                              8.8    6.42  )-----------( 244      ( 2021 05:56 )             29.5   06-14    137  |  97<L>  |  44.7<H>  ----------------------------<  101<H>  4.4   |  27.0  |  4.14<H>    Ca    9.0      2021 05:56  Mg     1.8     -14        PT/INR - ( 2021 05:56 )   PT: 13.0 sec;   INR: 1.13 ratio         PTT - ( 2021 05:56 )  PTT:41.6 sec      Objective:  T(C): 36.9 (21 @ 15:06), Max: 37.3 (21 @ 21:34)  HR: 72 (21 @ 15:06) (71 - 78)  BP: 134/56 (21 @ 15:06) (131/52 - 180/67)  RR: 18 (21 @ 15:06) (17 - 18)  SpO2: 100% (21 @ 15:06) (95% - 100%)  Wt(kg): --CAPILLARY BLOOD GLUCOSE      I&O's Summary    2021 07:01  -  2021 07:00  --------------------------------------------------------  IN: 360 mL / OUT: 70 mL / NET: 290 mL        Physical Exam  Neuro: A+O x 3, non-focal, speech clear and intact  Pulm: diminished bilaterally   CV: +S1S2  Abd: soft, NT, ND, +BS  Ext: +DP Pulses b/l, +PT pulses, no edema  Chest tubes: Left pleural chest tube removed     Imaging:  CXR:    < from: Xray Chest 1 View- PORTABLE-Routine (Xray Chest 1 View- PORTABLE-Routine in AM.) (21 @ 06:03) >  INTERPRETATION:  History: End-stage renal disease.    AP view of the chest was obtained.    Comparison: Makenna 10, 2021.    Impression: Left basilar pigtail chest tube is noted. There is an unchanged tiny left apical pneumothorax. Vague patchy airspace opacity seen within both lungs, unchanged. A loop recorder and a micra device overlie the left chest. Patient is status post TAVR. The heart size is within normal limits.    < end of copied text >

## 2021-06-14 NOTE — PROGRESS NOTE ADULT - SUBJECTIVE AND OBJECTIVE BOX
[facility]                                      [facility]                              [address] [address]                                                 PHONE: [phone]                                                 FAX: [phone]  -----------------------------------------------------------------------------------------------  Pt seen and examined. FU for  shortness of breath     Overnight events/Complaints: Pt without complains. CT in place    Vital Signs Last 24 Hrs  T(C): 36.6 (14 Jun 2021 09:09), Max: 37.3 (13 Jun 2021 21:34)  T(F): 97.8 (14 Jun 2021 09:09), Max: 99.1 (13 Jun 2021 21:34)  HR: 72 (14 Jun 2021 09:09) (71 - 88)  BP: 133/55 (14 Jun 2021 09:09) (133/55 - 180/67)  BP(mean): 81 (14 Jun 2021 09:09) (81 - 89)  RR: 18 (14 Jun 2021 09:09) (17 - 18)  SpO2: 97% (14 Jun 2021 09:09) (95% - 100%)  I&O's Summary    13 Jun 2021 07:01  -  14 Jun 2021 07:00  --------------------------------------------------------  IN: 360 mL / OUT: 70 mL / NET: 290 mL        PHYSICAL EXAM:  General: Elderly male in bed  HEENT: Head; normocephalic, atraumatic. Pupils reactive, cornea wnl. Neck supple, no nodes adenopathy, (+) JVD  CARDIOVASCULAR: Normal S1 and S2, 1/6 DEANA, no rub, gallop or lift.   LUNGS: + chest tube  ABDOMEN: Soft, nontender without mass or organomegaly. bowel sounds normoactive.  EXTREMITIES: No clubbing, cyanosis; (+)  L>R edema.   SKIN: warm and dry with normal turgor.  NEURO: Alert/oriented x 2/normal motor exam.         LABS:                        8.8    6.42  )-----------( 244      ( 14 Jun 2021 05:56 )             29.5     06-14    137  |  97<L>  |  44.7<H>  ----------------------------<  101<H>  4.4   |  27.0  |  4.14<H>    Ca    9.0      14 Jun 2021 05:56  Mg     1.8     06-14    PT/INR - ( 14 Jun 2021 05:56 )   PT: 13.0 sec;   INR: 1.13 ratio         PTT - ( 14 Jun 2021 05:56 )  PTT:41.6 sec    RADIOLOGY & ADDITIONAL STUDIES: (reviewed)  CXR was independently visualized/reviewed  and demonstrated: mild congestion    CARDIOLOGY TESTING:(reviewed)     ECHOCARDIOGRAM independently visualized/reviewed and demonstrated :    1. There is moderate concentric left ventricular hypertrophy.   2. Normal global left ventricular systolic function.   3. Left ventricular ejection fraction, by visual estimation, is 60 to 65%.   4. Normal right ventricular size and function.   5. Severely enlarged left atrium.   6. Moderately enlarged right atrium.   7. Bioprosthesis in the aortic position with normal gradients.   8. Moderate mitral valve regurgitation.   9. Moderate mitral annular calcification.  10. Moderate tricuspid regurgitation.  11. Estimated pulmonary artery systolic pressure is 80.6 mmHg assuming a right atrial pressure of 8 mmHg, which is consistent with severe pulmonary hypertension.  12. Moderate mitral stenosis, increased gradient partly due to tachycardia.    TELEMETRY independently visualized/reviewed and demonstrated : AF    MEDICATIONS:(reviewed)  MEDICATIONS  (STANDING):  atorvastatin 80 milliGRAM(s) Oral at bedtime  chlorhexidine 2% Cloths 1 Application(s) Topical <User Schedule>  DULoxetine 90 milliGRAM(s) Oral daily  enoxaparin Injectable 65 milliGRAM(s) SubCutaneous daily  epoetin juan-epbx (RETACRIT) Injectable 10158 Unit(s) IV Push <User Schedule>  isosorbide   mononitrate ER Tablet (IMDUR) 30 milliGRAM(s) Oral daily  NIFEdipine XL 90 milliGRAM(s) Oral daily  NIFEdipine XL 60 milliGRAM(s) Oral at bedtime  pantoprazole    Tablet 40 milliGRAM(s) Oral two times a day  tamsulosin 0.4 milliGRAM(s) Oral at bedtime  torsemide 20 milliGRAM(s) Oral <User Schedule>      ASSESSMENT AND PLAN:     87y Male with past medical history significant for PAF off AC due to GIBs, leadless PPM, ILR in place, nonobst CAD with normal EF, AS s/p TAVR, ESRD on HD, LLE fem-pop bypass, s/p bypass 3/20/21, post procedure developed L femoral DVT and s/p revision of LLE bypass 3/25/21 who presents with bilateral LE ext weakness with difficulty to ambulate found to have extension of DVT, low level troponin (non-ACS), MRI brain with bilateral frontal subdural collections suggesting subdural hematomas or subdural hygromas and pleural effusion s/p chest tube placement     Pleural effusion/DVT/subdural collection  - on lovenox  - continue statin therapy   - diuretics on non-HD days   - Volume status managed with HD  - CT tube   - continue nifedipine and imdur for BP control  - trop elevation is not consistent with ACS, no additional inpatient work-up

## 2021-06-14 NOTE — PROGRESS NOTE ADULT - ASSESSMENT
85 y/o M with PMHx of HTN, HLD, CAD, HFpEF, s/p Right CEA for Carotid artery disease, ESRD on HD 2d/week (M/Fri) via LUE fistula, s/p flecainide w/ ILR placed 6/2020, AS s/p TAVR, and PAD (RLE fem-pop bypass October 2020) multiple gastric AVM's s/p cauterized s/p LLE fem-pop bypass, and had the bypass performed on 3/20/21, post procedure developed L femoral DVT and s/p revision of LLE bypass 3/25/21 brought to ED with cc of b/l LE ext weakness with difficulty to ambulate for past 2 days.      # B/l LE weakness/ -Symptoms likely secondary to progressing/extensive DVT (Duplex: New extension of LEFT lower extremity thrombus into the LEFT femoral vein)  - Acute CVA ruled  out  - CT head negative for acute pathology  - MRI brain: Trace bilateral frontal subdural collections suggesting subdural hematomas or subdural hygromas.  - Seen in consult by neuro and neurosurgical teams  - MRI C/T/L spine ordered by neurosurgery, no acute actionable findings   - Continue Lipitor 80mg po qhs   - dc  Plavix as per  vascular team due to high bleeding risk    - changed to renal dosed lovenox  - so far tolerating well    #Left large pleural effusion - transudative; first identified on MRI spine and followed up with CT chest which confirmed  - s/p drainage 6/7 , pluerex cath likely to be dcd by ct surgery prior to dc to rehab  - plans for removal today    #Left Lower Extremity DVT  - B/L LE edema, L>R  - Venous duplex b/l LE: New extension of LLE thrombus into L femoral vein  - MRI head with subdural collection suggestive of subdural hematoma or hygroma  - MRI spine with no acute actionable findings  - Vascular surgery consult appreciated: no IVC filter recommended at this time   - renal dose lovenox  - dc plavix     #Elevated Troponin levels in setting of ESRD  - Asymptomatic   - EKG: NSR, rate 82/min, first degree AV block   - TTE w/o WMA  -  Continue telemetry monitoring   - Cardiology consult appreciated, no further recs/work up     #Generalized weakness, leg weakness     - Likely due to LLE edema that is prominent  - PT eval appreciated: home w/ assist; home w/ home PT vs [facility]  - family wishing acute rehab     #PVD/HLD/HTN  - c/w statin, nifedipine Imdur  - dcd plavix     #ESRD on HD  - Continue HD  - Nephrology following     #Depression  - Continue duloxetine    #Hx of GI bleed due to gastric AVMs, follows with dr. barrera as op   - Monitor h/h   - Continue PPI    DISPO: family wishes acute rehab . pending auth for AR.

## 2021-06-14 NOTE — PROGRESS NOTE ADULT - PROBLEM SELECTOR PLAN 1
transudative with unclear etiology  Likely related to ESRD and fluid volume overload  For HD per renal  D/W Dr Giron  Will continue to monitor
Now s/p left pigtail placement, transudative with unclear etiology - likely combined HFpEF, MR, renal failure and malnutrition  Will maintain pigtail to waterseal   Repeat CXR in AM  On HD per renal  Will continue to monitor  Further care per primary team  Possible Pleurex 6/14    Plan of care d/w Dr. Degroot
Now s/p left pigtail placement, transudative with unclear etiology - likely combined HFpEF, MR, renal failure and malnutrition  Will maintain pigtail to waterseal   Repeat CXR in AM  On HD per renal  Will continue to monitor  Further care per primary team  plan d/w CTS team in AM
Now s/p left pigtail placement, transudative with unclear etiology - likely combined HFpEF, MR, renal failure and malnutrition  Will maintain pigtail to waterseal   Repeat CXR in AM  On HD per renal  Will continue to monitor  Further care per primary team  plan d/w CTS team in AM
s/p left pigtail placement, transudative with unclear etiology - likely combined HFpEF, MR, renal failure and malnutrition  Repeat CXR in AM  On HD per renal   HD today 6/14  Left pigtail removed 6/14  Will continue to monitor  Further care per primary team    Plan of care d/w thoracic team in AM rounds
Now s/p left pigtail placement, transudative with unclear etiology  Will maintain pigtail to waterseal   Repeat CXR in AM  On HD per renal  Will continue to monitor  Further care per primary team  plan d/w CTS team in AM
Now s/p left pigtail placement, transudative with unclear etiology - likely combined HFpEF, MR, renal failure and malnutrition  Will maintain pigtail to waterseal   Repeat CXR in AM  On HD per renal  Will continue to monitor  Further care per primary team  plan d/w CTS team in AM

## 2021-06-15 LAB
HCT VFR BLD CALC: 34.6 % — LOW (ref 39–50)
HGB BLD-MCNC: 10.2 G/DL — LOW (ref 13–17)
MCHC RBC-ENTMCNC: 28.7 PG — SIGNIFICANT CHANGE UP (ref 27–34)
MCHC RBC-ENTMCNC: 29.5 GM/DL — LOW (ref 32–36)
MCV RBC AUTO: 97.2 FL — SIGNIFICANT CHANGE UP (ref 80–100)
PLATELET # BLD AUTO: 267 K/UL — SIGNIFICANT CHANGE UP (ref 150–400)
RBC # BLD: 3.56 M/UL — LOW (ref 4.2–5.8)
RBC # FLD: 18.6 % — HIGH (ref 10.3–14.5)
WBC # BLD: 6.58 K/UL — SIGNIFICANT CHANGE UP (ref 3.8–10.5)
WBC # FLD AUTO: 6.58 K/UL — SIGNIFICANT CHANGE UP (ref 3.8–10.5)

## 2021-06-15 PROCEDURE — 99233 SBSQ HOSP IP/OBS HIGH 50: CPT

## 2021-06-15 PROCEDURE — 99232 SBSQ HOSP IP/OBS MODERATE 35: CPT

## 2021-06-15 PROCEDURE — 99231 SBSQ HOSP IP/OBS SF/LOW 25: CPT

## 2021-06-15 PROCEDURE — 71045 X-RAY EXAM CHEST 1 VIEW: CPT | Mod: 26

## 2021-06-15 PROCEDURE — 71045 X-RAY EXAM CHEST 1 VIEW: CPT | Mod: 26,77

## 2021-06-15 RX ADMIN — ATORVASTATIN CALCIUM 80 MILLIGRAM(S): 80 TABLET, FILM COATED ORAL at 22:11

## 2021-06-15 RX ADMIN — DULOXETINE HYDROCHLORIDE 90 MILLIGRAM(S): 30 CAPSULE, DELAYED RELEASE ORAL at 11:56

## 2021-06-15 RX ADMIN — TAMSULOSIN HYDROCHLORIDE 0.4 MILLIGRAM(S): 0.4 CAPSULE ORAL at 22:11

## 2021-06-15 RX ADMIN — ISOSORBIDE MONONITRATE 30 MILLIGRAM(S): 60 TABLET, EXTENDED RELEASE ORAL at 11:56

## 2021-06-15 RX ADMIN — Medication 90 MILLIGRAM(S): at 06:09

## 2021-06-15 RX ADMIN — CHLORHEXIDINE GLUCONATE 1 APPLICATION(S): 213 SOLUTION TOPICAL at 06:10

## 2021-06-15 RX ADMIN — PANTOPRAZOLE SODIUM 40 MILLIGRAM(S): 20 TABLET, DELAYED RELEASE ORAL at 06:09

## 2021-06-15 RX ADMIN — PANTOPRAZOLE SODIUM 40 MILLIGRAM(S): 20 TABLET, DELAYED RELEASE ORAL at 17:40

## 2021-06-15 RX ADMIN — Medication 650 MILLIGRAM(S): at 02:03

## 2021-06-15 RX ADMIN — ENOXAPARIN SODIUM 65 MILLIGRAM(S): 100 INJECTION SUBCUTANEOUS at 22:11

## 2021-06-15 RX ADMIN — Medication 60 MILLIGRAM(S): at 22:11

## 2021-06-15 NOTE — PROGRESS NOTE ADULT - SUBJECTIVE AND OBJECTIVE BOX
Patient fatigued this AM.  Reports no pain.  Chest tube has been removed.     REVIEW OF SYSTEMS  Constitutional - No fever,  +fatigue  Neurological - +loss of strength, No numbness, No tremors    FUNCTIONAL PROGRESS  6/13  Bed Mobility  Bed Mobility Training Sit-to-Supine: unable to perform;  pt found left OOB in recliner with all lines intact   Bed Mobility Training Supine-to-Sit: moderate assist (50% patient effort);  1 person assist;  verbal cues  Bed Mobility Training Limitations: decreased ability to use legs for bridging/pushing;  decreased ability to use arms for pushing/pulling;  decreased strength;  impaired balance;  pain    Bed-Chair Transfer Training  Transfer Training Bed-to-Chair Transfer: maximum assist (25% patient effort);  2 person assist;  full weight-bearing   PT + PT aide assist;  stand pivot transfer   Transfer Training Chair-to-Bed Transfer: unable to perform;  pt left OOB in recliner with all lines intact  Bed-to-Chair Transfer Training Transfer Safety Analysis: decreased balance;  decreased step length;  decreased weight-shifting ability;  decreased flexibility;  decreased strength;  impaired balance;  pain    Sit-Stand Transfer Training  Transfer Training Sit-to-Stand Transfer: moderate assist (50% patient effort);  2 person assist;  full weight-bearing   rolling walker;  pt performed transfer twice, pt demonstrates a decrease in hip extension and reports increase in bilat LE pain with weight bearing.   Transfer Training Stand-to-Sit Transfer: moderate assist (50% patient effort);  2 person assist;  full weight-bearing  Sit-to-Stand Transfer Training Transfer Safety Analysis: decreased balance;  decreased step length;  decreased weight-shifting ability;  decreased strength;  impaired balance;  pain      VITALS  T(C): 37.2 (06-15-21 @ 10:00), Max: 37.2 (06-15-21 @ 10:00)  HR: 77 (06-15-21 @ 10:00) (72 - 81)  BP: 151/65 (06-15-21 @ 10:00) (134/56 - 184/74)  RR: 18 (06-15-21 @ 06:07) (18 - 18)  SpO2: 100% (06-15-21 @ 10:00) (96% - 100%)  Wt(kg): --    MEDICATIONS   acetaminophen   Tablet .. 650 milliGRAM(s) every 6 hours PRN  atorvastatin 80 milliGRAM(s) at bedtime  chlorhexidine 2% Cloths 1 Application(s) <User Schedule>  DULoxetine 90 milliGRAM(s) daily  enoxaparin Injectable 65 milliGRAM(s) daily  epoetin juan-epbx (RETACRIT) Injectable 20563 Unit(s) <User Schedule>  isosorbide   mononitrate ER Tablet (IMDUR) 30 milliGRAM(s) daily  NIFEdipine XL 90 milliGRAM(s) daily  NIFEdipine XL 60 milliGRAM(s) at bedtime  pantoprazole    Tablet 40 milliGRAM(s) two times a day  tamsulosin 0.4 milliGRAM(s) at bedtime  torsemide 20 milliGRAM(s) <User Schedule>      RECENT LABS/IMAGING                          10.2   6.58  )-----------( 267      ( 15 Rolly 2021 06:54 )             34.6     06-14    137  |  97<L>  |  44.7<H>  ----------------------------<  101<H>  4.4   |  27.0  |  4.14<H>    Ca    9.0      14 Jun 2021 05:56  Mg     1.8     06-14      PT/INR - ( 14 Jun 2021 05:56 )   PT: 13.0 sec;   INR: 1.13 ratio         PTT - ( 14 Jun 2021 05:56 )  PTT:41.6 sec            MRI HEAD 6/3 - 1.  No acute infarct. 2.  Trace bilateral frontal subdural collections suggesting subdural hematomas or subdural hygromas.    BLE DOPPLER 6/3 - New extension of LEFT lower extremity thrombus into the LEFT femoral vein.    MRI Thoracic spine 6/4 1.  No evidence of epidural collection. 2.  There are T1, T2, and STIR hyperintense lesions in the T10 vertebral body and left T3 pedicle. These are nonspecific but could represent atypical hemangiomas. 3.  Large left pleural effusion. Consider CT chest.    MRI Lumbar spine 6/4 - 1.  No evidence of epidural collection. 2.  Lumbar degenerative changes. At L4-L5, there is severe spinal canal narrowing. 3.  T2 and STIR hyperintense lesions are noted in the L3 and L5 vertebral bodies, which are nonspecific but could represent atypical hemangiomas.    CT CHEST 6/6 - Moderate left pleural effusion with complete collapse left lower lobe and partial collapse lingula.    CXR 6/7 - Moderate left pleural effusion with chest tube in place and no pneumothorax. Small right pleural effusion, better characterized on CT.    CXR 6/8 - LEFT chest tube in place. Clearing of LEFT basilar airspace disease and effusion..    CXR 6/9 - Small left pleural effusion, improved    CXR 6/9 - No pleural effusion. Increased perihilar markings may represent infiltrate in the correct clinical context    CXR 6/12 - Left basilar pigtail chest tube is noted. There is an unchanged tiny left apical pneumothorax. Vague patchy airspace opacity seen within both lungs, unchanged. A loop recorder and a micra device overlie the left chest. Patient is status post TAVR. The heart size is within normal limits.    CXR 6/15 - Increased perihilar/retrocardiac markings, recommend correlation for interstitial infiltrate.  ----------------------------------------------------------------------------------------  PHYSICAL EXAM  Constitutional - NAD, Comfortable  Extremities - Left LE swelling - pitting +1  Neurologic Exam -         Motor -                      LEFT    UE - ShAB 4/5, EF 5/5, EE 5/5, WE 5/5,  5/5                    RIGHT UE - ShAB 4/5, EF 5/5, EE 5/5, WE 5/5,  5/5                    LEFT    LE - HF 3/5, KE 4/5, DF 2/5                     RIGHT LE - HF 4/5, KE 4/5, DF 4/5      Sensory - Intact to LT  Psychiatric - Fatigued  ----------------------------------------------------------------------------------------  ASSESSMENT/PLAN  87yMale with functional deficits after developing BLE weakness  Left LE DVT extension - Lovenox  CAD - Lipitor  CHF - Demadex  Pleural Effusion - Chest Tube x1  HTN - Imdur, Procardia  ESRD - HD  Pain - Tylenol, Cymbalta  DVT PPX - SCDs  Rehab - Plan is to pursue [facility] pending auth. CM aware. Discussed with daughter.    Continue bedside therapy as well as OOB throughout the day with mobilization by staff to maintain cardiopulmonary function and prevention of secondary complications related to debility.     Discussed with rehab team.

## 2021-06-15 NOTE — PROGRESS NOTE ADULT - SUBJECTIVE AND OBJECTIVE BOX
CC: Lower extremity weakness and elevated trop level (11 Jun 2021 17:12)    INTERVAL HPI/OVERNIGHT EVENTS:  no acute events overnight    Vital Signs Last 24 Hrs  T(C): 36.7 (15 Rolly 2021 06:07), Max: 36.9 (14 Jun 2021 15:06)  T(F): 98 (15 Rolly 2021 06:07), Max: 98.4 (14 Jun 2021 15:06)  HR: 81 (15 Rolly 2021 06:07) (72 - 81)  BP: 165/73 (15 Rolly 2021 06:07) (131/52 - 184/74)  BP(mean): --  RR: 18 (15 Rolly 2021 06:07) (18 - 18)  SpO2: 96% (15 Rolly 2021 06:07) (96% - 100%)    EXAM:  General: NAD, calm, cooperative  Lungs: CTAB, chest tube removed  Heart: irregular rate  Abdomen: NT, ND, soft  Neuro: alert and oriented to person, place and time; RLE 4/5 compared to LLE which is 3/5 strength; no slurred speech noted; follows commands  Skin: intact                          10.2   6.58  )-----------( 267      ( 15 Rolly 2021 06:54 )             34.6     06-14    137  |  97<L>  |  44.7<H>  ----------------------------<  101<H>  4.4   |  27.0  |  4.14<H>    Ca    9.0      14 Jun 2021 05:56  Mg     1.8     06-14    MEDICATIONS  (STANDING):  atorvastatin 80 milliGRAM(s) Oral at bedtime  chlorhexidine 2% Cloths 1 Application(s) Topical <User Schedule>  DULoxetine 90 milliGRAM(s) Oral daily  enoxaparin Injectable 65 milliGRAM(s) SubCutaneous daily  epoetin juan-epbx (RETACRIT) Injectable 16123 Unit(s) IV Push <User Schedule>  isosorbide   mononitrate ER Tablet (IMDUR) 30 milliGRAM(s) Oral daily  NIFEdipine XL 90 milliGRAM(s) Oral daily  NIFEdipine XL 60 milliGRAM(s) Oral at bedtime  pantoprazole    Tablet 40 milliGRAM(s) Oral two times a day  tamsulosin 0.4 milliGRAM(s) Oral at bedtime  torsemide 20 milliGRAM(s) Oral <User Schedule>    MEDICATIONS  (PRN):  acetaminophen   Tablet .. 650 milliGRAM(s) Oral every 6 hours PRN Temp greater or equal to 38C (100.4F), Mild Pain (1 - 3)      RADIOLOGY & ADDITIONAL TESTS: CC: Lower extremity weakness and elevated trop level (11 Jun 2021 17:12)    INTERVAL HPI/OVERNIGHT EVENTS:  no acute events overnight  reports cough  cough is dry and non-productive  had repeat cxr this morning    Vital Signs Last 24 Hrs  T(C): 36.7 (15 Rolly 2021 06:07), Max: 36.9 (14 Jun 2021 15:06)  T(F): 98 (15 Rolly 2021 06:07), Max: 98.4 (14 Jun 2021 15:06)  HR: 81 (15 Rolly 2021 06:07) (72 - 81)  BP: 165/73 (15 Rolly 2021 06:07) (131/52 - 184/74)  BP(mean): --  RR: 18 (15 Rolly 2021 06:07) (18 - 18)  SpO2: 96% (15 Rolly 2021 06:07) (96% - 100%)    EXAM:  General: NAD, calm, cooperative  Lungs: CTAB, chest tube removed  Heart: irregular rate  Abdomen: NT, ND, soft  Neuro: alert and oriented to person, place and time; RLE 4/5 compared to LLE which is 3/5 strength; no slurred speech noted; follows commands  Skin: intact                        10.2   6.58  )-----------( 267      ( 15 Rolly 2021 06:54 )             34.6     06-14    137  |  97<L>  |  44.7<H>  ----------------------------<  101<H>  4.4   |  27.0  |  4.14<H>    Ca    9.0      14 Jun 2021 05:56  Mg     1.8     06-14    MEDICATIONS  (STANDING):  atorvastatin 80 milliGRAM(s) Oral at bedtime  chlorhexidine 2% Cloths 1 Application(s) Topical <User Schedule>  DULoxetine 90 milliGRAM(s) Oral daily  enoxaparin Injectable 65 milliGRAM(s) SubCutaneous daily  epoetin juan-epbx (RETACRIT) Injectable 88300 Unit(s) IV Push <User Schedule>  isosorbide   mononitrate ER Tablet (IMDUR) 30 milliGRAM(s) Oral daily  NIFEdipine XL 90 milliGRAM(s) Oral daily  NIFEdipine XL 60 milliGRAM(s) Oral at bedtime  pantoprazole    Tablet 40 milliGRAM(s) Oral two times a day  tamsulosin 0.4 milliGRAM(s) Oral at bedtime  torsemide 20 milliGRAM(s) Oral <User Schedule>    MEDICATIONS  (PRN):  acetaminophen   Tablet .. 650 milliGRAM(s) Oral every 6 hours PRN Temp greater or equal to 38C (100.4F), Mild Pain (1 - 3)      RADIOLOGY & ADDITIONAL TESTS:

## 2021-06-15 NOTE — PROGRESS NOTE ADULT - ASSESSMENT
85 y/o M with PMHx of HTN, HLD, CAD, HFpEF, s/p Right CEA for Carotid artery disease, ESRD on HD 2d/week (M/Fri) via LUE fistula, s/p flecainide w/ ILR placed 6/2020, AS s/p TAVR, and PAD (RLE fem-pop bypass October 2020) multiple gastric AVM's s/p cauterized s/p LLE fem-pop bypass, and had the bypass performed on 3/20/21, post procedure developed L femoral DVT and s/p revision of LLE bypass 3/25/21 brought to ED with cc of b/l LE ext weakness with difficulty to ambulate for past 2 days.      # B/l LE weakness/ -Symptoms likely secondary to progressing/extensive DVT (Duplex: New extension of LEFT lower extremity thrombus into the LEFT femoral vein)  - Acute CVA ruled  out  - CT head negative for acute pathology  - MRI brain: Trace bilateral frontal subdural collections suggesting subdural hematomas or subdural hygromas.  - Seen in consult by neuro and neurosurgical teams  - MRI C/T/L spine ordered by neurosurgery, no acute actionable findings   - Continue Lipitor 80mg po qhs   - dc  Plavix as per  vascular team due to high bleeding risk    - changed to renal dosed lovenox  - so far tolerating well    #Left large pleural effusion - transudative; first identified on MRI spine and followed up with CT chest which confirmed  - s/p drainage 6/7 , pluerex cath placed on 6/7  - removed on 6/14  - CXR ordered for this AM    #Left Lower Extremity DVT  - B/L LE edema, L>R  - Venous duplex b/l LE: New extension of LLE thrombus into L femoral vein  - MRI head with subdural collection suggestive of subdural hematoma or hygroma  - MRI spine with no acute actionable findings  - Vascular surgery consult appreciated: no IVC filter recommended at this time   - renal dose lovenox  - dc plavix     #Elevated Troponin levels in setting of ESRD  - Asymptomatic   - EKG: NSR, rate 82/min, first degree AV block   - TTE w/o WMA  -  Continue telemetry monitoring   - Cardiology consult appreciated, no further recs/work up     #Generalized weakness, leg weakness     - Likely due to LLE edema that is prominent  - PT eval appreciated: home w/ assist; home w/ home PT vs [facility]  - family wishing acute rehab     #PVD/HLD/HTN  - c/w statin, nifedipine Imdur  - dcd plavix     #ESRD on HD  - Continue HD  - Nephrology following     #Depression  - Continue duloxetine    #Hx of GI bleed due to gastric AVMs, follows with dr. barrera as op   - Monitor h/h   - Continue PPI    DISPO: family wishes acute rehab . pending auth for AR. 85 y/o M with PMHx of HTN, HLD, CAD, HFpEF, s/p Right CEA for Carotid artery disease, ESRD on HD 2d/week (M/Fri) via LUE fistula, s/p flecainide w/ ILR placed 6/2020, AS s/p TAVR, and PAD (RLE fem-pop bypass October 2020) multiple gastric AVM's s/p cauterized s/p LLE fem-pop bypass, and had the bypass performed on 3/20/21, post procedure developed L femoral DVT and s/p revision of LLE bypass 3/25/21 brought to ED with cc of b/l LE ext weakness with difficulty to ambulate for past 2 days.      # B/l LE weakness/ -Symptoms likely secondary to progressing/extensive DVT (Duplex: New extension of LEFT lower extremity thrombus into the LEFT femoral vein)  - Acute CVA ruled  out  - CT head negative for acute pathology  - MRI brain: Trace bilateral frontal subdural collections suggesting subdural hematomas or subdural hygromas.  - Seen in consult by neuro and neurosurgical teams  - MRI C/T/L spine ordered by neurosurgery, no acute actionable findings   - Continue Lipitor 80mg po qhs   - dc  Plavix as per  vascular team due to high bleeding risk    - changed to renal dosed lovenox  - so far tolerating well    #Left large pleural effusion - transudative; first identified on MRI spine and followed up with CT chest which confirmed  - s/p drainage 6/7 , pluerex cath placed on 6/7  - removed on 6/14  - CXR ordered for this AM - without reaccumulation of fluid    #cough  - I believe this is from manipulation of the pleural space  - repeat CXR without changes to suggest reaccumulation of fluid or infiltrative process  - will monitor clinically for signs/symptoms of fever/chills or increased WBC that would suggest pneumonia  - discussed with daughter    #Left Lower Extremity DVT  - B/L LE edema, L>R  - Venous duplex b/l LE: New extension of LLE thrombus into L femoral vein  - MRI head with subdural collection suggestive of subdural hematoma or hygroma  - MRI spine with no acute actionable findings  - Vascular surgery consult appreciated: no IVC filter recommended at this time   - renal dose lovenox  - dc plavix     #Elevated Troponin levels in setting of ESRD  - Asymptomatic   - EKG: NSR, rate 82/min, first degree AV block   - TTE w/o WMA  -  Continue telemetry monitoring   - Cardiology consult appreciated, no further recs/work up     #Generalized weakness, leg weakness     - PT eval appreciated: home w/ assist; home w/ home PT vs [facility]  - family wishing acute rehab     #PVD/HLD/HTN  - c/w statin, nifedipine Imdur  - dcd plavix     #ESRD on HD  - Continue HD  - Nephrology following     #Depression  - Continue duloxetine    #Hx of GI bleed due to gastric AVMs, follows with dr. barrera as op   - Monitor h/h   - Continue PPI    DISPO: family wishes acute rehab . pending auth for AR.

## 2021-06-15 NOTE — PROGRESS NOTE ADULT - SUBJECTIVE AND OBJECTIVE BOX
[facility]                                   [address] [place] [address]                                                      PHONE: [phone]                                                         FAX: [phone]  [email or web address]  ---------------------------------------------------------------------------------------------------------------------------------    Overnight events/patient complaints: patient seen at bedside.       Plavix (Hives)  Toprol-XL (Rash)    MEDICATIONS  (STANDING):  atorvastatin 80 milliGRAM(s) Oral at bedtime  chlorhexidine 2% Cloths 1 Application(s) Topical <User Schedule>  DULoxetine 90 milliGRAM(s) Oral daily  enoxaparin Injectable 65 milliGRAM(s) SubCutaneous daily  epoetin juan-epbx (RETACRIT) Injectable 46379 Unit(s) IV Push <User Schedule>  isosorbide   mononitrate ER Tablet (IMDUR) 30 milliGRAM(s) Oral daily  NIFEdipine XL 90 milliGRAM(s) Oral daily  NIFEdipine XL 60 milliGRAM(s) Oral at bedtime  pantoprazole    Tablet 40 milliGRAM(s) Oral two times a day  tamsulosin 0.4 milliGRAM(s) Oral at bedtime  torsemide 20 milliGRAM(s) Oral <User Schedule>    MEDICATIONS  (PRN):  acetaminophen   Tablet .. 650 milliGRAM(s) Oral every 6 hours PRN Temp greater or equal to 38C (100.4F), Mild Pain (1 - 3)      Vital Signs Last 24 Hrs  T(C): 36.7 (15 Rolly 2021 06:07), Max: 36.9 (14 Jun 2021 15:06)  T(F): 98 (15 Rolly 2021 06:07), Max: 98.4 (14 Jun 2021 15:06)  HR: 81 (15 Rolly 2021 06:07) (72 - 81)  BP: 165/73 (15 Rolly 2021 06:07) (131/52 - 184/74)  BP(mean): --  RR: 18 (15 Rolly 2021 06:07) (18 - 18)  SpO2: 96% (15 Rolly 2021 06:07) (96% - 100%)  ICU Vital Signs Last 24 Hrs  CHRISTIANO ELIZABETH  I&O's Detail    Drug Dosing Weight  CHRISTIANO ELIZABETH      PHYSICAL EXAM:  General: Appears well developed, well nourished alert and cooperative.  HEENT: Head; normocephalic, atraumatic.  Eyes: Pupils reactive, cornea wnl.  Neck: Supple, no nodes adenopathy, no NVD or carotid bruit or thyromegaly.  CARDIOVASCULAR: Normal S1 and S2, No murmur, rub, gallop or lift.   LUNGS: reduced breath sounds b/l base  ABDOMEN: Soft, nontender without mass or organomegaly. bowel sounds normoactive.  EXTREMITIES: trace edema b/l  SKIN: warm and dry with normal turgor.  NEURO: Alert/oriented x 3/normal motor exam. No pathologic reflexes.    PSYCH: normal affect.        LABS:                        10.2   6.58  )-----------( 267      ( 15 Rolly 2021 06:54 )             34.6     06-14    137  |  97<L>  |  44.7<H>  ----------------------------<  101<H>  4.4   |  27.0  |  4.14<H>    Ca    9.0      14 Jun 2021 05:56  Mg     1.8     06-14      CHRISTIANO ELIZABETH      PT/INR - ( 14 Jun 2021 05:56 )   PT: 13.0 sec;   INR: 1.13 ratio         PTT - ( 14 Jun 2021 05:56 )  PTT:41.6 sec      RADIOLOGY & ADDITIONAL STUDIES:    INTERPRETATION OF TELEMETRY (personally reviewed)    ASSESSMENT AND PLAN:   87y Male with past medical history significant for PAF off AC due to GIBs, leadless PPM, ILR in place, nonobst CAD with normal EF, AS s/p TAVR, ESRD on HD, LLE fem-pop bypass, s/p bypass 3/20/21, post procedure developed L femoral DVT and s/p revision of LLE bypass 3/25/21 who presents with bilateral LE ext weakness with difficulty to ambulate found to have extension of DVT, low level troponin (non-ACS), MRI brain with bilateral frontal subdural collections suggesting subdural hematomas or subdural hygromas and pleural effusion s/p chest tube placement     Pleural Effusion, DVT, SDH  - continue lovenox if okay with neurosurgery  - continue statin therapy   - diuretics per nephrology   - chest tube management per thoracic surgery   - continue nifedipine and imdur for BP control    Will follow with you.

## 2021-06-16 LAB
GLUCOSE BLDC GLUCOMTR-MCNC: 142 MG/DL — HIGH (ref 70–99)
HCT VFR BLD CALC: 31 % — LOW (ref 39–50)
HGB BLD-MCNC: 9.1 G/DL — LOW (ref 13–17)
MCHC RBC-ENTMCNC: 29 PG — SIGNIFICANT CHANGE UP (ref 27–34)
MCHC RBC-ENTMCNC: 29.4 GM/DL — LOW (ref 32–36)
MCV RBC AUTO: 98.7 FL — SIGNIFICANT CHANGE UP (ref 80–100)
PLATELET # BLD AUTO: 259 K/UL — SIGNIFICANT CHANGE UP (ref 150–400)
RBC # BLD: 3.14 M/UL — LOW (ref 4.2–5.8)
RBC # FLD: 18.3 % — HIGH (ref 10.3–14.5)
WBC # BLD: 7.69 K/UL — SIGNIFICANT CHANGE UP (ref 3.8–10.5)
WBC # FLD AUTO: 7.69 K/UL — SIGNIFICANT CHANGE UP (ref 3.8–10.5)

## 2021-06-16 PROCEDURE — 99233 SBSQ HOSP IP/OBS HIGH 50: CPT

## 2021-06-16 PROCEDURE — 99232 SBSQ HOSP IP/OBS MODERATE 35: CPT

## 2021-06-16 RX ORDER — DULOXETINE HYDROCHLORIDE 30 MG/1
3 CAPSULE, DELAYED RELEASE ORAL
Qty: 0 | Refills: 0 | DISCHARGE
Start: 2021-06-16

## 2021-06-16 RX ORDER — ACETAMINOPHEN 500 MG
2 TABLET ORAL
Qty: 0 | Refills: 0 | DISCHARGE
Start: 2021-06-16

## 2021-06-16 RX ORDER — NIFEDIPINE 30 MG
1 TABLET, EXTENDED RELEASE 24 HR ORAL
Qty: 0 | Refills: 0 | DISCHARGE
Start: 2021-06-16

## 2021-06-16 RX ORDER — ATORVASTATIN CALCIUM 80 MG/1
1 TABLET, FILM COATED ORAL
Qty: 0 | Refills: 0 | DISCHARGE
Start: 2021-06-16

## 2021-06-16 RX ORDER — TAMSULOSIN HYDROCHLORIDE 0.4 MG/1
1 CAPSULE ORAL
Qty: 0 | Refills: 0 | DISCHARGE
Start: 2021-06-16

## 2021-06-16 RX ORDER — SACCHAROMYCES BOULARDII 250 MG
1 POWDER IN PACKET (EA) ORAL
Qty: 0 | Refills: 0 | DISCHARGE

## 2021-06-16 RX ORDER — NIFEDIPINE 30 MG
1 TABLET, EXTENDED RELEASE 24 HR ORAL
Qty: 0 | Refills: 0 | DISCHARGE

## 2021-06-16 RX ORDER — ENOXAPARIN SODIUM 100 MG/ML
65 INJECTION SUBCUTANEOUS
Qty: 0 | Refills: 0 | DISCHARGE
Start: 2021-06-16

## 2021-06-16 RX ORDER — ATORVASTATIN CALCIUM 80 MG/1
1 TABLET, FILM COATED ORAL
Qty: 0 | Refills: 0 | DISCHARGE

## 2021-06-16 RX ADMIN — ENOXAPARIN SODIUM 65 MILLIGRAM(S): 100 INJECTION SUBCUTANEOUS at 21:14

## 2021-06-16 RX ADMIN — CHLORHEXIDINE GLUCONATE 1 APPLICATION(S): 213 SOLUTION TOPICAL at 05:46

## 2021-06-16 RX ADMIN — Medication 90 MILLIGRAM(S): at 05:46

## 2021-06-16 RX ADMIN — Medication 20 MILLIGRAM(S): at 11:49

## 2021-06-16 RX ADMIN — TAMSULOSIN HYDROCHLORIDE 0.4 MILLIGRAM(S): 0.4 CAPSULE ORAL at 21:15

## 2021-06-16 RX ADMIN — ISOSORBIDE MONONITRATE 30 MILLIGRAM(S): 60 TABLET, EXTENDED RELEASE ORAL at 07:47

## 2021-06-16 RX ADMIN — ATORVASTATIN CALCIUM 80 MILLIGRAM(S): 80 TABLET, FILM COATED ORAL at 21:15

## 2021-06-16 RX ADMIN — Medication 650 MILLIGRAM(S): at 21:15

## 2021-06-16 RX ADMIN — DULOXETINE HYDROCHLORIDE 90 MILLIGRAM(S): 30 CAPSULE, DELAYED RELEASE ORAL at 07:47

## 2021-06-16 RX ADMIN — PANTOPRAZOLE SODIUM 40 MILLIGRAM(S): 20 TABLET, DELAYED RELEASE ORAL at 17:53

## 2021-06-16 RX ADMIN — Medication 60 MILLIGRAM(S): at 21:15

## 2021-06-16 RX ADMIN — PANTOPRAZOLE SODIUM 40 MILLIGRAM(S): 20 TABLET, DELAYED RELEASE ORAL at 05:46

## 2021-06-16 RX ADMIN — Medication 650 MILLIGRAM(S): at 22:00

## 2021-06-16 NOTE — PROGRESS NOTE ADULT - SUBJECTIVE AND OBJECTIVE BOX
CC: Lower extremity weakness and elevated trop level (11 Jun 2021 17:12)    INTERVAL HPI/OVERNIGHT EVENTS:  no acute events overnight  without acute complaints    Vital Signs Last 24 Hrs  T(C): 36.7 (15 Rolly 2021 06:07), Max: 36.9 (14 Jun 2021 15:06)  T(F): 98 (15 Rolly 2021 06:07), Max: 98.4 (14 Jun 2021 15:06)  HR: 81 (15 Orlly 2021 06:07) (72 - 81)  BP: 165/73 (15 Rolly 2021 06:07) (131/52 - 184/74)  BP(mean): --  RR: 18 (15 Rolly 2021 06:07) (18 - 18)  SpO2: 96% (15 Rolly 2021 06:07) (96% - 100%)    EXAM:  General: NAD, calm, cooperative  Lungs: CTAB, chest tube removed  Heart: irregular rate  Abdomen: NT, ND, soft  Neuro: alert and oriented to person, place and time; RLE 4/5 compared to LLE which is 3/5 strength; no slurred speech noted; follows commands  Skin: intact             MEDICATIONS  (STANDING):  atorvastatin 80 milliGRAM(s) Oral at bedtime  chlorhexidine 2% Cloths 1 Application(s) Topical <User Schedule>  DULoxetine 90 milliGRAM(s) Oral daily  enoxaparin Injectable 65 milliGRAM(s) SubCutaneous daily  epoetin juan-epbx (RETACRIT) Injectable 08996 Unit(s) IV Push <User Schedule>  isosorbide   mononitrate ER Tablet (IMDUR) 30 milliGRAM(s) Oral daily  NIFEdipine XL 90 milliGRAM(s) Oral daily  NIFEdipine XL 60 milliGRAM(s) Oral at bedtime  pantoprazole    Tablet 40 milliGRAM(s) Oral two times a day  tamsulosin 0.4 milliGRAM(s) Oral at bedtime  torsemide 20 milliGRAM(s) Oral <User Schedule>    MEDICATIONS  (PRN):  acetaminophen   Tablet .. 650 milliGRAM(s) Oral every 6 hours PRN Temp greater or equal to 38C (100.4F), Mild Pain (1 - 3)    RADIOLOGY & ADDITIONAL TESTS:

## 2021-06-16 NOTE — PROGRESS NOTE ADULT - SUBJECTIVE AND OBJECTIVE BOX
CC: Lower extremity weakness and elevated trop level (11 Jun 2021 17:12)    INTERVAL HPI/OVERNIGHT EVENTS:  no acute events overnight  without acute complaints    Vital Signs Last 24 Hrs  T(C): 36.7 (15 Rolly 2021 06:07), Max: 36.9 (14 Jun 2021 15:06)  T(F): 98 (15 Rolly 2021 06:07), Max: 98.4 (14 Jun 2021 15:06)  HR: 81 (15 Rolly 2021 06:07) (72 - 81)  BP: 165/73 (15 Rolly 2021 06:07) (131/52 - 184/74)  BP(mean): --  RR: 18 (15 Rolly 2021 06:07) (18 - 18)  SpO2: 96% (15 Rolly 2021 06:07) (96% - 100%)    EXAM:  General: NAD, calm, cooperative  Lungs: CTAB, chest tube removed  Heart: irregular rate  Abdomen: NT, ND, soft  Neuro: alert and oriented to person, place and time; RLE 4/5 compared to LLE which is 3/5 strength; no slurred speech noted; follows commands  Skin: intact             MEDICATIONS  (STANDING):  atorvastatin 80 milliGRAM(s) Oral at bedtime  chlorhexidine 2% Cloths 1 Application(s) Topical <User Schedule>  DULoxetine 90 milliGRAM(s) Oral daily  enoxaparin Injectable 65 milliGRAM(s) SubCutaneous daily  epoetin juan-epbx (RETACRIT) Injectable 42864 Unit(s) IV Push <User Schedule>  isosorbide   mononitrate ER Tablet (IMDUR) 30 milliGRAM(s) Oral daily  NIFEdipine XL 90 milliGRAM(s) Oral daily  NIFEdipine XL 60 milliGRAM(s) Oral at bedtime  pantoprazole    Tablet 40 milliGRAM(s) Oral two times a day  tamsulosin 0.4 milliGRAM(s) Oral at bedtime  torsemide 20 milliGRAM(s) Oral <User Schedule>    MEDICATIONS  (PRN):  acetaminophen   Tablet .. 650 milliGRAM(s) Oral every 6 hours PRN Temp greater or equal to 38C (100.4F), Mild Pain (1 - 3)        85 y/o M with PMHx of HTN, HLD, CAD, HFpEF, s/p Right CEA for Carotid artery disease, ESRD on HD 2d/week (M/Fri) via LUE fistula, s/p flecainide w/ ILR placed 6/2020, AS s/p TAVR, and PAD (RLE fem-pop bypass October 2020) multiple gastric AVM's s/p cauterized s/p LLE fem-pop bypass, and had the bypass performed on 3/20/21, post procedure developed L femoral DVT and s/p revision of LLE bypass 3/25/21 brought to ED with cc of b/l LE ext weakness with difficulty to ambulate for past 2 days.      # B/l LE weakness/ -Symptoms likely secondary to progressing/extensive DVT (Duplex: New extension of LEFT lower extremity thrombus into the LEFT femoral vein)  - Acute CVA ruled  out  - CT head negative for acute pathology  - MRI brain: Trace bilateral frontal subdural collections suggesting subdural hematomas or subdural hygromas.  - Seen in consult by neuro and neurosurgical teams  - MRI C/T/L spine ordered by neurosurgery, no acute actionable findings   - Continue Lipitor 80mg po qhs   - dc  Plavix as per  vascular team due to high bleeding risk    - changed to renal dosed lovenox  - so far tolerating well    #Left large pleural effusion - transudative; first identified on MRI spine and followed up with CT chest which confirmed  - s/p drainage 6/7 , pluerex cath placed on 6/7  - removed on 6/14  - CXR repeated on 6/15x2 showed no reaccumulation of fluid    #cough  - I believe this is from manipulation of the pleural space  - repeat CXR without changes to suggest reaccumulation of fluid or infiltrative process  - will monitor clinically for signs/symptoms of fever/chills or increased WBC that would suggest pneumonia  - discussed with daughter    #Left Lower Extremity DVT  - B/L LE edema, L>R  - Venous duplex b/l LE: New extension of LLE thrombus into L femoral vein  - MRI head with subdural collection suggestive of subdural hematoma or hygroma  - MRI spine with no acute actionable findings  - Vascular surgery consult appreciated: no IVC filter recommended at this time   - renal dose lovenox  - dc plavix     #Elevated Troponin levels in setting of ESRD  - Asymptomatic   - EKG: NSR, rate 82/min, first degree AV block   - TTE w/o WMA  -  Continue telemetry monitoring   - Cardiology consult appreciated, no further recs/work up     #Generalized weakness, leg weakness     - PT eval appreciated: home w/ assist; home w/ home PT vs [facility]  - family wishing acute rehab     #PVD/HLD/HTN  - c/w statin, nifedipine Imdur  - dcd plavix     #ESRD on HD  - Continue HD  - Nephrology following     #Depression  - Continue duloxetine    #Hx of GI bleed due to gastric AVMs, follows with dr. barrera as op   - Monitor h/h   - Continue PPI    DISPO: family wishes acute rehab . pending auth for AR.

## 2021-06-16 NOTE — PROGRESS NOTE ADULT - SUBJECTIVE AND OBJECTIVE BOX
Patient sitting in chair.  reports his back pain is improved.  Could not really support himself in standing.   Needed 2 people to assist.     REVIEW OF SYSTEMS  Constitutional - No fever,  +fatigue  HEENT - No vertigo, No neck pain  Neurological - No headaches, No memory loss, +loss of strength   Musculoskeletal - +joint pain, No joint swelling, +muscle pain  Psychiatric - +depression, No anxiety    FUNCTIONAL PROGRESS  6/15  Bed-Chair Transfer Training  Transfer Training Bed-to-Chair Transfer: maximum assist (25% patient effort);  1 person assist;  verbal cues;  full weight-bearing  Transfer Training Chair-to-Bed Transfer: maximum assist (25% patient effort);  verbal cues;  1 person assist;  full weight-bearing  Bed-to-Chair Transfer Training Transfer Safety Analysis: decreased sequencing ability;  decreased weight-shifting ability;  decreased strength;  cognitive, decreased safety awareness    Sit-Stand Transfer Training  Transfer Training Sit-to-Stand Transfer: maximum assist (25% patient effort);  1 person assist;  verbal cues;  full weight-bearing  Transfer Training Stand-to-Sit Transfer: maximum assist (25% patient effort);  1 person assist;  verbal cues;  full weight-bearing  Sit-to-Stand Transfer Training Transfer Safety Analysis: decreased sequencing ability;  decreased weight-shifting ability;  decreased strength;  pain    Gait Training  Gait Training: maximum assist (25% patient effort);  1 person assist;  verbal cues;  full weight-bearing   5 feet  Gait Analysis: swing-to gait   crouch;  decreased hip/knee flexion;  shuffling;  decreased step length;  decreased chadd;  increased time in double stance;  decreased strength;  5 feet      VITALS  T(C): 36.4 (06-16-21 @ 07:43), Max: 37.2 (06-15-21 @ 10:00)  HR: 70 (06-16-21 @ 07:43) (70 - 77)  BP: 145/57 (06-16-21 @ 07:43) (145/57 - 156/70)  RR: 20 (06-16-21 @ 07:43) (18 - 20)  SpO2: 94% (06-16-21 @ 07:43) (94% - 100%)  Wt(kg): --    MEDICATIONS   acetaminophen   Tablet .. 650 milliGRAM(s) every 6 hours PRN  atorvastatin 80 milliGRAM(s) at bedtime  chlorhexidine 2% Cloths 1 Application(s) <User Schedule>  DULoxetine 90 milliGRAM(s) daily  enoxaparin Injectable 65 milliGRAM(s) daily  epoetin juan-epbx (RETACRIT) Injectable 30397 Unit(s) <User Schedule>  isosorbide   mononitrate ER Tablet (IMDUR) 30 milliGRAM(s) daily  NIFEdipine XL 90 milliGRAM(s) daily  NIFEdipine XL 60 milliGRAM(s) at bedtime  pantoprazole    Tablet 40 milliGRAM(s) two times a day  tamsulosin 0.4 milliGRAM(s) at bedtime  torsemide 20 milliGRAM(s) <User Schedule>      RECENT LABS/IMAGING                          9.1    7.69  )-----------( 259      ( 16 Jun 2021 07:12 )             31.0                         MRI HEAD 6/3 - 1.  No acute infarct. 2.  Trace bilateral frontal subdural collections suggesting subdural hematomas or subdural hygromas.    BLE DOPPLER 6/3 - New extension of LEFT lower extremity thrombus into the LEFT femoral vein.    MRI Thoracic spine 6/4 1.  No evidence of epidural collection. 2.  There are T1, T2, and STIR hyperintense lesions in the T10 vertebral body and left T3 pedicle. These are nonspecific but could represent atypical hemangiomas. 3.  Large left pleural effusion. Consider CT chest.    MRI Lumbar spine 6/4 - 1.  No evidence of epidural collection. 2.  Lumbar degenerative changes. At L4-L5, there is severe spinal canal narrowing. 3.  T2 and STIR hyperintense lesions are noted in the L3 and L5 vertebral bodies, which are nonspecific but could represent atypical hemangiomas.    CT CHEST 6/6 - Moderate left pleural effusion with complete collapse left lower lobe and partial collapse lingula.    CXR 6/7 - Moderate left pleural effusion with chest tube in place and no pneumothorax. Small right pleural effusion, better characterized on CT.    CXR 6/8 - LEFT chest tube in place. Clearing of LEFT basilar airspace disease and effusion..    CXR 6/9 - Small left pleural effusion, improved    CXR 6/9 - No pleural effusion. Increased perihilar markings may represent infiltrate in the correct clinical context    CXR 6/12 - Left basilar pigtail chest tube is noted. There is an unchanged tiny left apical pneumothorax. Vague patchy airspace opacity seen within both lungs, unchanged. A loop recorder and a micra device overlie the left chest. Patient is status post TAVR. The heart size is within normal limits.    CXR 6/15 - Increased perihilar/retrocardiac markings, recommend correlation for interstitial infiltrate.    ----------------------------------------------------------------------------------------  PHYSICAL EXAM  Constitutional - NAD, Comfortable  Extremities - Left LE swelling - pitting +1  Neurologic Exam -         Motor -                      LEFT    UE - ShAB 4/5, EF 5/5, EE 5/5,  5/5                    RIGHT UE - ShAB 4/5, EF 5/5, EE 5/5,  5/5                    LEFT    LE - HF 3/5, KE 4/5, DF 2/5                     RIGHT LE - HF 3/5, KE 4/5, DF 4/5      Sensory - Intact to LT  Psychiatric - Fatigued, Depressed  ----------------------------------------------------------------------------------------  ASSESSMENT/PLAN  87yMale with functional deficits after developing BLE weakness  Left LE DVT extension - Lovenox  CAD - Lipitor  CHF - Demadex  HTN - Imdur, Procardia  ESRD - HD  Pain - Tylenol, Cymbalta  DVT PPX - SCDs  Rehab - Plan is to pursue [facility] pending auth.  Continue bedside therapy as well as OOB throughout the day with mobilization by staff to maintain cardiopulmonary function and prevention of secondary complications related to debility.     Discussed with rehab team.

## 2021-06-16 NOTE — PROGRESS NOTE ADULT - ASSESSMENT
85 y/o M with PMHx of HTN, HLD, CAD, HFpEF, s/p Right CEA for Carotid artery disease, ESRD on HD 2d/week (M/Fri) via LUE fistula, s/p flecainide w/ ILR placed 6/2020, AS s/p TAVR, and PAD (RLE fem-pop bypass October 2020) multiple gastric AVM's s/p cauterized s/p LLE fem-pop bypass, and had the bypass performed on 3/20/21, post procedure developed L femoral DVT and s/p revision of LLE bypass 3/25/21 brought to ED with cc of b/l LE ext weakness with difficulty to ambulate for past 2 days.      # B/l LE weakness/ -Symptoms likely secondary to progressing/extensive DVT (Duplex: New extension of LEFT lower extremity thrombus into the LEFT femoral vein)  - Acute CVA ruled  out  - CT head negative for acute pathology  - MRI brain: Trace bilateral frontal subdural collections suggesting subdural hematomas or subdural hygromas.  - Seen in consult by neuro and neurosurgical teams  - MRI C/T/L spine ordered by neurosurgery, no acute actionable findings   - Continue Lipitor 80mg po qhs   - dc  Plavix as per  vascular team due to high bleeding risk    - changed to renal dosed lovenox  - so far tolerating well    #Left large pleural effusion - transudative; first identified on MRI spine and followed up with CT chest which confirmed  - s/p drainage 6/7 , pluerex cath placed on 6/7  - removed on 6/14  - CXR ordered for this AM - without reaccumulation of fluid    #cough  - I believe this is from manipulation of the pleural space  - repeat CXR without changes to suggest reaccumulation of fluid or infiltrative process  - will monitor clinically for signs/symptoms of fever/chills or increased WBC that would suggest pneumonia  - discussed with daughter    #Left Lower Extremity DVT  - B/L LE edema, L>R  - Venous duplex b/l LE: New extension of LLE thrombus into L femoral vein  - MRI head with subdural collection suggestive of subdural hematoma or hygroma  - MRI spine with no acute actionable findings  - Vascular surgery consult appreciated: no IVC filter recommended at this time   - renal dose lovenox  - dc plavix     #Elevated Troponin levels in setting of ESRD  - Asymptomatic   - EKG: NSR, rate 82/min, first degree AV block   - TTE w/o WMA  -  Continue telemetry monitoring   - Cardiology consult appreciated, no further recs/work up     #Generalized weakness, leg weakness     - PT eval appreciated: home w/ assist; home w/ home PT vs [facility]  - family wishing acute rehab     #PVD/HLD/HTN  - c/w statin, nifedipine Imdur  - dcd plavix     #ESRD on HD  - Continue HD  - Nephrology following     #Depression  - Continue duloxetine    #Hx of GI bleed due to gastric AVMs, follows with dr. barrera as op   - Monitor h/h   - Continue PPI    DISPO: family wishes acute rehab . pending auth for AR. 85 y/o M with PMHx of HTN, HLD, CAD, HFpEF, s/p Right CEA for Carotid artery disease, ESRD on HD 2d/week (M/Fri) via LUE fistula, s/p flecainide w/ ILR placed 6/2020, AS s/p TAVR, and PAD (RLE fem-pop bypass October 2020) multiple gastric AVM's s/p cauterized s/p LLE fem-pop bypass, and had the bypass performed on 3/20/21, post procedure developed L femoral DVT and s/p revision of LLE bypass 3/25/21 brought to ED with cc of b/l LE ext weakness with difficulty to ambulate for past 2 days.      # B/l LE weakness/ -Symptoms likely secondary to progressing/extensive DVT (Duplex: New extension of LEFT lower extremity thrombus into the LEFT femoral vein)  - Acute CVA ruled  out  - CT head negative for acute pathology  - MRI brain: Trace bilateral frontal subdural collections suggesting subdural hematomas or subdural hygromas.  - Seen in consult by neuro and neurosurgical teams  - MRI C/T/L spine ordered by neurosurgery, no acute actionable findings   - Continue Lipitor 80mg po qhs   - dc  Plavix as per  vascular team due to high bleeding risk    - changed to renal dosed lovenox  - so far tolerating well    #Left large pleural effusion - transudative; first identified on MRI spine and followed up with CT chest which confirmed  - s/p drainage 6/7 , pluerex cath placed on 6/7  - removed on 6/14  - CXR repeated on 6/15x2 showed no reaccumulation of fluid    #cough  - I believe this is from manipulation of the pleural space  - repeat CXR without changes to suggest reaccumulation of fluid or infiltrative process  - will monitor clinically for signs/symptoms of fever/chills or increased WBC that would suggest pneumonia  - discussed with daughter    #Left Lower Extremity DVT  - B/L LE edema, L>R  - Venous duplex b/l LE: New extension of LLE thrombus into L femoral vein  - MRI head with subdural collection suggestive of subdural hematoma or hygroma  - MRI spine with no acute actionable findings  - Vascular surgery consult appreciated: no IVC filter recommended at this time   - renal dose lovenox  - dc plavix     #Elevated Troponin levels in setting of ESRD  - Asymptomatic   - EKG: NSR, rate 82/min, first degree AV block   - TTE w/o WMA  -  Continue telemetry monitoring   - Cardiology consult appreciated, no further recs/work up     #Generalized weakness, leg weakness     - PT eval appreciated: home w/ assist; home w/ home PT vs [facility]  - family wishing acute rehab     #PVD/HLD/HTN  - c/w statin, nifedipine Imdur  - dcd plavix     #ESRD on HD  - Continue HD  - Nephrology following     #Depression  - Continue duloxetine    #Hx of GI bleed due to gastric AVMs, follows with dr. barrera as op   - Monitor h/h   - Continue PPI    DISPO: family wishes acute rehab . pending auth for AR.

## 2021-06-17 LAB
HAV IGM SER-ACNC: SIGNIFICANT CHANGE UP
HBV CORE IGM SER-ACNC: SIGNIFICANT CHANGE UP
HBV SURFACE AB SER-ACNC: SIGNIFICANT CHANGE UP
HBV SURFACE AG SER-ACNC: SIGNIFICANT CHANGE UP
HCT VFR BLD CALC: 31 % — LOW (ref 39–50)
HCV AB S/CO SERPL IA: 0.13 S/CO — SIGNIFICANT CHANGE UP (ref 0–0.99)
HCV AB SERPL-IMP: SIGNIFICANT CHANGE UP
HGB BLD-MCNC: 9.3 G/DL — LOW (ref 13–17)
MCHC RBC-ENTMCNC: 29.4 PG — SIGNIFICANT CHANGE UP (ref 27–34)
MCHC RBC-ENTMCNC: 30 GM/DL — LOW (ref 32–36)
MCV RBC AUTO: 98.1 FL — SIGNIFICANT CHANGE UP (ref 80–100)
PLATELET # BLD AUTO: 271 K/UL — SIGNIFICANT CHANGE UP (ref 150–400)
RBC # BLD: 3.16 M/UL — LOW (ref 4.2–5.8)
RBC # FLD: 18 % — HIGH (ref 10.3–14.5)
SARS-COV-2 RNA SPEC QL NAA+PROBE: SIGNIFICANT CHANGE UP
WBC # BLD: 7.9 K/UL — SIGNIFICANT CHANGE UP (ref 3.8–10.5)
WBC # FLD AUTO: 7.9 K/UL — SIGNIFICANT CHANGE UP (ref 3.8–10.5)

## 2021-06-17 PROCEDURE — 99233 SBSQ HOSP IP/OBS HIGH 50: CPT

## 2021-06-17 PROCEDURE — 99232 SBSQ HOSP IP/OBS MODERATE 35: CPT

## 2021-06-17 PROCEDURE — 71045 X-RAY EXAM CHEST 1 VIEW: CPT | Mod: 26

## 2021-06-17 RX ORDER — GUAIFENESIN/DEXTROMETHORPHAN 600MG-30MG
10 TABLET, EXTENDED RELEASE 12 HR ORAL EVERY 6 HOURS
Refills: 0 | Status: DISCONTINUED | OUTPATIENT
Start: 2021-06-17 | End: 2021-06-21

## 2021-06-17 RX ADMIN — ATORVASTATIN CALCIUM 80 MILLIGRAM(S): 80 TABLET, FILM COATED ORAL at 21:01

## 2021-06-17 RX ADMIN — Medication 90 MILLIGRAM(S): at 05:42

## 2021-06-17 RX ADMIN — PANTOPRAZOLE SODIUM 40 MILLIGRAM(S): 20 TABLET, DELAYED RELEASE ORAL at 05:42

## 2021-06-17 RX ADMIN — Medication 650 MILLIGRAM(S): at 06:15

## 2021-06-17 RX ADMIN — ISOSORBIDE MONONITRATE 30 MILLIGRAM(S): 60 TABLET, EXTENDED RELEASE ORAL at 09:17

## 2021-06-17 RX ADMIN — Medication 10 MILLILITER(S): at 05:42

## 2021-06-17 RX ADMIN — DULOXETINE HYDROCHLORIDE 90 MILLIGRAM(S): 30 CAPSULE, DELAYED RELEASE ORAL at 09:17

## 2021-06-17 RX ADMIN — Medication 20 MILLIGRAM(S): at 09:17

## 2021-06-17 RX ADMIN — Medication 650 MILLIGRAM(S): at 23:09

## 2021-06-17 RX ADMIN — PANTOPRAZOLE SODIUM 40 MILLIGRAM(S): 20 TABLET, DELAYED RELEASE ORAL at 18:09

## 2021-06-17 RX ADMIN — Medication 650 MILLIGRAM(S): at 05:42

## 2021-06-17 RX ADMIN — CHLORHEXIDINE GLUCONATE 1 APPLICATION(S): 213 SOLUTION TOPICAL at 05:42

## 2021-06-17 RX ADMIN — Medication 60 MILLIGRAM(S): at 21:01

## 2021-06-17 RX ADMIN — TAMSULOSIN HYDROCHLORIDE 0.4 MILLIGRAM(S): 0.4 CAPSULE ORAL at 21:01

## 2021-06-17 RX ADMIN — ENOXAPARIN SODIUM 65 MILLIGRAM(S): 100 INJECTION SUBCUTANEOUS at 21:01

## 2021-06-17 NOTE — PROGRESS NOTE ADULT - SUBJECTIVE AND OBJECTIVE BOX
CC: Lower extremity weakness and elevated trop level (11 Jun 2021 17:12)    INTERVAL HPI/OVERNIGHT EVENTS:  no acute events overnight  without acute complaints    Vital Signs Last 24 Hrs  T(C): 36.4 (17 Jun 2021 20:53), Max: 36.7 (17 Jun 2021 05:42)  T(F): 97.5 (17 Jun 2021 20:53), Max: 98 (17 Jun 2021 05:42)  HR: 74 (17 Jun 2021 20:53) (71 - 76)  BP: 157/59 (17 Jun 2021 20:53) (133/48 - 157/59)  BP(mean): --  RR: 18 (17 Jun 2021 20:53) (18 - 18)  SpO2: 99% (17 Jun 2021 20:53) (93% - 99%)    EXAM:  General: NAD, calm, cooperative  Lungs: CTAB, chest tube removed  Heart: irregular rate  Abdomen: NT, ND, soft  Neuro: alert and oriented to person, place and time; RLE 4/5 compared to LLE which is 3/5 strength; no slurred speech noted; follows commands  Skin: intact                          9.3    7.90  )-----------( 271      ( 17 Jun 2021 06:58 )             31.0                  MEDICATIONS  (STANDING):  atorvastatin 80 milliGRAM(s) Oral at bedtime  chlorhexidine 2% Cloths 1 Application(s) Topical <User Schedule>  DULoxetine 90 milliGRAM(s) Oral daily  enoxaparin Injectable 65 milliGRAM(s) SubCutaneous daily  epoetin juan-epbx (RETACRIT) Injectable 83180 Unit(s) IV Push <User Schedule>  isosorbide   mononitrate ER Tablet (IMDUR) 30 milliGRAM(s) Oral daily  NIFEdipine XL 90 milliGRAM(s) Oral daily  NIFEdipine XL 60 milliGRAM(s) Oral at bedtime  pantoprazole    Tablet 40 milliGRAM(s) Oral two times a day  tamsulosin 0.4 milliGRAM(s) Oral at bedtime  torsemide 20 milliGRAM(s) Oral <User Schedule>    MEDICATIONS  (PRN):  acetaminophen   Tablet .. 650 milliGRAM(s) Oral every 6 hours PRN Temp greater or equal to 38C (100.4F), Mild Pain (1 - 3)    RADIOLOGY & ADDITIONAL TESTS:

## 2021-06-17 NOTE — CHART NOTE - NSCHARTNOTEFT_GEN_A_CORE
Medicine PA-  Cd for pt that has persistent dry cough, no c/o SOB or CP, pt hx lg L. pleural effusion that required draining, CT D/Cd 6/14.    Vital Signs Last 24 Hrs  T(C): 36.3 (16 Jun 2021 21:07), Max: 36.4 (16 Jun 2021 07:43)  T(F): 97.4 (16 Jun 2021 21:07), Max: 97.6 (16 Jun 2021 07:43)  HR: 70 (16 Jun 2021 21:07) (70 - 73)  BP: 163/64 (16 Jun 2021 21:07) (145/57 - 163/64)  BP(mean): --  RR: 20 (16 Jun 2021 21:07) (20 - 20)  SpO2: 100% (16 Jun 2021 21:07) (94% - 100%)    Exam:  Gen- Pt in NAD  S1S2 ausc  Lungs- clear, sl diminished @ L. base    > Hx L. pleural effusion  -CXR ordered   -Robitussin DM PRN  -continue to monitor
RN called to report elevated /75.  Per sanjuana recommendations pt rx one time dose of hydralazine 10mg IV.      Vital Signs Last 24 Hrs  T(C): 36.8 (04 Jun 2021 20:31), Max: 36.9 (04 Jun 2021 01:39)  T(F): 98.2 (04 Jun 2021 20:31), Max: 98.5 (04 Jun 2021 01:39)  HR: 71 (04 Jun 2021 22:00) (71 - 95)  BP: 177/75 (04 Jun 2021 22:00) (133/56 - 177/75)  BP(mean): --  RR: 18 (04 Jun 2021 22:00) (16 - 18)  SpO2: 97% (04 Jun 2021 22:00) (91% - 100%)
Source: Patient [x ]  Family [ ]   other [ ]    Current Diet: Diet, DASH/TLC:   Sodium & Cholesterol Restricted  For patients receiving Renal Replacement - No Protein Restr, No Conc K, No Conc Phos, Low  Sodium (RENAL)   Tube Feed Start Time: 21:00  Supplement Feeding Modality:  Oral  Nepro Cans or Servings Per Day:  1       Frequency:  Two Times a day (06-10-21 @ 14:10)    PO intake:  Pt reports mostly good po intake at meals; likes and consume Nepro supplements.    Current Weight:   (6/17) 143.9 lbs  (6/9) 145.2 lbs  (6/3) 150.3 lbs    % Weight Change - possible wt loss noted, will continue to monitor.  Aware pt with 1+ trace generalized/right leg edema and 2+ mild left leg/foot edema noted per documentation.    Pertinent Medications: MEDICATIONS  (STANDING):  atorvastatin 80 milliGRAM(s) Oral at bedtime  chlorhexidine 2% Cloths 1 Application(s) Topical <User Schedule>  DULoxetine 90 milliGRAM(s) Oral daily  enoxaparin Injectable 65 milliGRAM(s) SubCutaneous daily  epoetin juan-epbx (RETACRIT) Injectable 98733 Unit(s) IV Push <User Schedule>  isosorbide   mononitrate ER Tablet (IMDUR) 30 milliGRAM(s) Oral daily  NIFEdipine XL 90 milliGRAM(s) Oral daily  NIFEdipine XL 60 milliGRAM(s) Oral at bedtime  pantoprazole    Tablet 40 milliGRAM(s) Oral two times a day  tamsulosin 0.4 milliGRAM(s) Oral at bedtime  torsemide 20 milliGRAM(s) Oral <User Schedule>    MEDICATIONS  (PRN):  acetaminophen   Tablet .. 650 milliGRAM(s) Oral every 6 hours PRN Temp greater or equal to 38C (100.4F), Mild Pain (1 - 3)  guaifenesin/dextromethorphan Oral Liquid 10 milliLiter(s) Oral every 6 hours PRN Cough    Pertinent Labs: CBC Full  -  ( 17 Jun 2021 06:58 )  WBC Count : 7.90 K/uL  RBC Count : 3.16 M/uL  Hemoglobin : 9.3 g/dL  Hematocrit : 31.0 %  Platelet Count - Automated : 271 K/uL  Mean Cell Volume : 98.1 fl  Mean Cell Hemoglobin : 29.4 pg  Mean Cell Hemoglobin Concentration : 30.0 gm/dL    Skin: sx incision left chest    Nutrition focused physical exam previously conducted - found signs of malnutrition [ ]absent [ x]present    Subcutaneous fat loss: [x ] Orbital fat pads region, [ ]Buccal fat region, [ ]Triceps region,  [ ]Ribs region    Muscle wasting: [ x]Temples region, [ x]Clavicle region, [ x]Shoulder region, [ ]Scapula region, [ ]Interosseous region,  [ ]thigh region, [ ]Calf region    Current Nutrition Diagnosis: Pt remains at nutrition risk secondary to moderate protein calorie malnutrition (chronic) related to inability to meet increased protein-energy needs in setting of recent generalized weakness, ESRD on HD and multiple hospitalizations as evidenced by pt with moderate muscle wasting/fat loss, ~7% unintentional wt loss x 2 months; likely meeting <75% estimated energy intake > 1 month.  Pt with possible additional wt loss since admission.  Pt reports mostly good po intake since admission and consumes Nepro supplements at meals.      Recommendations:   1. Continue with Nepro BID  2. RX: Nephro-pito daily  3. Monitor daily wts     Monitoring and Evaluation:   [x ] PO intake [x ] Tolerance to diet prescription [X] Weights  [X] Follow up per protocol [X] Labs:
s/p removal of pigtail yesterday, CXR this morning without reaccumulation of effusoin, pt currently waiting for discharge to [facility]. will sign off. please reconsult as needed  [phone]    d/w Dr. Reynolds
Source: Patient [x ]  Family [ ]   other [ ]    Current Diet: Diet, DASH/TLC:   Sodium & Cholesterol Restricted  For patients receiving Renal Replacement - No Protein Restr, No Conc K, No Conc Phos, Low  Sodium (RENAL) (06-02-21 @ 14:25)    PO intake:  Pt reports mostly good po intake at meals.    Current Weight:   (6/9) 145.2 lbs  (6/3) 150.3 lbs    % Weight Change - possible 5lb wt loss noted since admission, will continue to monitor for accuracy.  Aware pt with 2+ mild edema left leg noted per documentation.    Pertinent Medications: MEDICATIONS  (STANDING):  atorvastatin 80 milliGRAM(s) Oral at bedtime  DULoxetine 60 milliGRAM(s) Oral daily  enoxaparin Injectable 65 milliGRAM(s) SubCutaneous daily  epoetin juan-epbx (RETACRIT) Injectable 80099 Unit(s) IV Push <User Schedule>  epoetin juan-epbx (RETACRIT) Injectable 16140 Unit(s) SubCutaneous <User Schedule>  isosorbide   mononitrate ER Tablet (IMDUR) 30 milliGRAM(s) Oral daily  NIFEdipine XL 90 milliGRAM(s) Oral daily  NIFEdipine XL 60 milliGRAM(s) Oral at bedtime  pantoprazole    Tablet 40 milliGRAM(s) Oral two times a day  tamsulosin 0.4 milliGRAM(s) Oral at bedtime  torsemide 20 milliGRAM(s) Oral <User Schedule>    MEDICATIONS  (PRN):    Pertinent Labs: CBC Full  -  ( 10 Rolly 2021 06:22 )  WBC Count : 8.38 K/uL  RBC Count : 3.43 M/uL  Hemoglobin : 10.1 g/dL  Hematocrit : 33.5 %  Platelet Count - Automated : 262 K/uL  Mean Cell Volume : 97.7 fl  Mean Cell Hemoglobin : 29.4 pg  Mean Cell Hemoglobin Concentration : 30.1 gm/dL  06-10 Na139 mmol/L Glu 84 mg/dL K+ 5.0 mmol/L Cr  4.55 mg/dL<H> BUN 34.7 mg/dL<H> Phos n/a   Alb n/a   PAB n/a       Skin: no skin breakdown noted     Nutrition focused physical exam conducted - found signs of malnutrition [ ]absent [x ]present    Subcutaneous fat loss: [x ] Orbital fat pads region, [ ]Buccal fat region, [ ]Triceps region,  [ ]Ribs region    Muscle wasting: [x ]Temples region, [x ]Clavicle region, [x ]Shoulder region, [ ]Scapula region, [ ]Interosseous region,  [ ]thigh region, [ ]Calf region    Current Nutrition Diagnosis: Pt remains at nutrition risk secondary to moderate protein calorie malnutrition (chronic) related to inability to meet increased protein-energy needs in setting of recent generalized weakness, ESRD on HD and multiple hospitalizations as evidenced by pt with moderate muscle wasting/fat loss, ~7% unintentional wt loss x 2 months; likely meeting <75% estimated energy intake > 1 month.  Pt with mostly good po intake since admission.  Aware dc planning in progress.    Recommendations:   1. RX: Nepro BID  2. RX: Nephro-pito daily  3. Monitor daily wts     Monitoring and Evaluation:   [x ] PO intake [x ] Tolerance to diet prescription [X] Weights  [X] Follow up per protocol [X] Labs:

## 2021-06-17 NOTE — PROGRESS NOTE ADULT - ASSESSMENT
85 y/o M with PMHx of HTN, HLD, CAD, HFpEF, s/p Right CEA for Carotid artery disease, ESRD on HD 2d/week (M/Fri) via LUE fistula, s/p flecainide w/ ILR placed 6/2020, AS s/p TAVR, and PAD (RLE fem-pop bypass October 2020) multiple gastric AVM's s/p cauterized s/p LLE fem-pop bypass, and had the bypass performed on 3/20/21, post procedure developed L femoral DVT and s/p revision of LLE bypass 3/25/21 brought to ED with cc of b/l LE ext weakness with difficulty to ambulate for past 2 days.      # B/l LE weakness/ -Symptoms likely secondary to progressing/extensive DVT (Duplex: New extension of LEFT lower extremity thrombus into the LEFT femoral vein)  - Acute CVA ruled  out  - CT head negative for acute pathology  - MRI brain: Trace bilateral frontal subdural collections suggesting subdural hematomas or subdural hygromas.  - Seen in consult by neuro and neurosurgical teams  - MRI C/T/L spine ordered by neurosurgery, no acute actionable findings   - Continue Lipitor 80mg po qhs   - dc  Plavix as per  vascular team due to high bleeding risk    - changed to renal dosed lovenox  - so far tolerating well    #Left large pleural effusion - transudative; first identified on MRI spine and followed up with CT chest which confirmed  - s/p drainage 6/7 , pluerex cath placed on 6/7  - removed on 6/14  - CXR repeated on 6/15x2 showed no reaccumulation of fluid    #cough  - I believe this is from manipulation of the pleural space  - repeat CXR without changes to suggest reaccumulation of fluid or infiltrative process  - will monitor clinically for signs/symptoms of fever/chills or increased WBC that would suggest pneumonia  - discussed with daughter    #Left Lower Extremity DVT  - B/L LE edema, L>R  - Venous duplex b/l LE: New extension of LLE thrombus into L femoral vein  - MRI head with subdural collection suggestive of subdural hematoma or hygroma  - MRI spine with no acute actionable findings  - Vascular surgery consult appreciated: no IVC filter recommended at this time   - renal dose lovenox  - dc plavix     #Elevated Troponin levels in setting of ESRD  - Asymptomatic   - EKG: NSR, rate 82/min, first degree AV block   - TTE w/o WMA  -  Continue telemetry monitoring   - Cardiology consult appreciated, no further recs/work up     #Generalized weakness, leg weakness     - PT eval appreciated: home w/ assist; home w/ home PT vs [facility]  - family wishing acute rehab     #PVD/HLD/HTN  - c/w statin, nifedipine Imdur  - dcd plavix     #ESRD on HD  - Continue HD  - Nephrology following     #Depression  - Continue duloxetine    #Hx of GI bleed due to gastric AVMs, follows with dr. barrera as op   - Monitor h/h   - Continue PPI    DISPO: did not get auth for [facility]; will do P2P on Friday at 930AM

## 2021-06-17 NOTE — PROGRESS NOTE ADULT - SUBJECTIVE AND OBJECTIVE BOX
[facility], [facility]                                   [address] [place] [address]                                                      PHONE: [phone]                                                         FAX: [phone]  [email or web address]  ---------------------------------------------------------------------------------------------------------------------------------    Overnight events/patient complaints: s/p removal of his chest tube. concerned about persistent minimally productive cough    INTERPRETATION OF TELEMETRY (personally reviewed): paced     ECHOCARDIOGRAM independently visualized/reviewed and demonstrated :    1. There is moderate concentric left ventricular hypertrophy.   2. Normal global left ventricular systolic function.   3. Left ventricular ejection fraction, by visual estimation, is 60 to 65%.   4. Normal right ventricular size and function.   5. Severely enlarged left atrium.   6. Moderately enlarged right atrium.   7. Bioprosthesis in the aortic position with normal gradients.   8. Moderate mitral valve regurgitation.   9. Moderate mitral annular calcification.  10. Moderate tricuspid regurgitation.  11. Estimated pulmonary artery systolic pressure is 80.6 mmHg assuming a right atrial pressure of 8 mmHg, which is consistent with severe pulmonary hypertension.  12. Moderate mitral stenosis, increased gradient partly due to tachycardia.    I&O's Summary    16 Jun 2021 07:01  -  17 Jun 2021 07:00  --------------------------------------------------------  IN: 120 mL / OUT: 0 mL / NET: 120 mL      Allergies   Plavix (Hives)  Toprol-XL (Rash)    MEDICATIONS  (STANDING):  atorvastatin 80 milliGRAM(s) Oral at bedtime  chlorhexidine 2% Cloths 1 Application(s) Topical <User Schedule>  DULoxetine 90 milliGRAM(s) Oral daily  enoxaparin Injectable 65 milliGRAM(s) SubCutaneous daily  epoetin juan-epbx (RETACRIT) Injectable 89187 Unit(s) IV Push <User Schedule>  isosorbide   mononitrate ER Tablet (IMDUR) 30 milliGRAM(s) Oral daily  NIFEdipine XL 90 milliGRAM(s) Oral daily  NIFEdipine XL 60 milliGRAM(s) Oral at bedtime  pantoprazole    Tablet 40 milliGRAM(s) Oral two times a day  tamsulosin 0.4 milliGRAM(s) Oral at bedtime  torsemide 20 milliGRAM(s) Oral <User Schedule>    MEDICATIONS  (PRN):  acetaminophen   Tablet .. 650 milliGRAM(s) Oral every 6 hours PRN Temp greater or equal to 38C (100.4F), Mild Pain (1 - 3)  guaifenesin/dextromethorphan Oral Liquid 10 milliLiter(s) Oral every 6 hours PRN Cough      Vital Signs Last 24 Hrs  T(C): 36.6 (17 Jun 2021 09:15), Max: 36.7 (17 Jun 2021 05:42)  T(F): 97.8 (17 Jun 2021 09:15), Max: 98 (17 Jun 2021 05:42)  HR: 76 (17 Jun 2021 09:15) (70 - 76)  BP: 142/60 (17 Jun 2021 09:15) (133/48 - 163/64)  BP(mean): --  RR: 18 (17 Jun 2021 09:15) (18 - 20)  SpO2: 97% (17 Jun 2021 09:15) (93% - 100%)  ICU Vital Signs Last 24 Hrs    PHYSICAL EXAM:  General: Elderly man in no acute distress   HEENT: Head; normocephalic, atraumatic. Neck supple, no nodes adenopathy, no JVD  CARDIOVASCULAR: Normal S1 and S2, 2/6 DEANA  LUNGS:poor effort, decreased at the bases   ABDOMEN: Soft, nontender without mass or organomegaly. bowel sounds normoactive.  EXTREMITIES: No clubbing, cyanosis, trivial edema.   SKIN: warm and dry with normal turgor.  NEURO: Alert/oriented x 2/normal motor exam.   PSYCH: normal affect.        LABS:                        9.3    7.90  )-----------( 271      ( 17 Jun 2021 06:58 )             31.0       RADIOLOGY & ADDITIONAL STUDIES:  < from: Xray Chest 1 View-PORTABLE IMMEDIATE (Xray Chest 1 View-PORTABLE IMMEDIATE .) (06.15.21 @ 11:09) >  Increased perihilar/retrocardiac markings, recommend correlation for interstitial infiltrate.    ASSESSMENT AND PLAN:  In summary, CHRISTIANO ELIZABETH is a 87y Male with past medical history significant for PAF off AC due to GIBs, leadless PPM, ILR in place, nonobst CAD with normal EF, AS s/p TAVR, ESRD on HD, LLE fem-pop bypass, s/p bypass 3/20/21, post procedure developed L femoral DVT and s/p revision of LLE bypass 3/25/21 who presents with bilateral LE ext weakness with difficulty to ambulate found to have extension of DVT, low level troponin (non-ACS), MRI brain with bilateral frontal subdural collections suggesting subdural hematomas or subdural hygromas and pleural effusion s/p chest tube placement     Pleural effusion/DVT/subdural collection/cough  - on lovenox and off antiplatelet therapy  - continue statin therapy   - diuretics on non-HD days   - continue nifedipine and imdur for BP control  - CXR from 6/15 with possible infiltrate, and now with increased cough, consider repeat CXR or non-contrast CT for follow-up     - care otherwise per primary team     Thank you for allowing [facility] to participate in the care of this patient.  Please feel free to call with any questions or concerns.

## 2021-06-17 NOTE — PROGRESS NOTE ADULT - ASSESSMENT
ESRD on HD  Left moderate pleural effusion s/p thoracentesis  LLE DVT  Anemia of CKD    Continue HD MF schedule  On Lovenox- renal dosing; may have bleeding complications as Lovenox is not dialyzable- monitor H/H  Hgb.,  low, continue AMY

## 2021-06-17 NOTE — PROGRESS NOTE ADULT - SUBJECTIVE AND OBJECTIVE BOX
CC: Lower extremity weakness and elevated trop level (11 Jun 2021 17:12)    INTERVAL HPI/ OVERNIGHT EVENTS:    no acute events overnight,    without acute complaints, in Chair, Non Ambulatory,     Vital Signs Last 24 Hrs,    T(C): 36.7 (15 Rolly 2021 06:07), Max: 36.9 (14 Jun 2021 15:06)  T(F): 98 (15 Rolly 2021 06:07), Max: 98.4 (14 Jun 2021 15:06)  HR: 81 (15 Rolly 2021 06:07) (72 - 81)  BP: 165/73 (15 Rolly 2021 06:07) (131/52 - 184/74)  RR: 18 (15 Rolly 2021 06:07) (18 - 18)  SpO2: 96% (15 Rolly 2021 06:07) (96% - 100%)    EXAM:    General: NAD, calm, cooperative, Pale,   Lungs: CTAB, chest tube removed  Heart: irregular rate  Abdomen: NT, ND, soft  Neuro: alert and oriented to person, place and time; RLE 4/5 compared to LLE which is 3/5 strength; no slurred speech noted; follows commands  Skin: intact             MEDICATIONS  (STANDING):  atorvastatin 80 milliGRAM(s) Oral at bedtime  chlorhexidine 2% Cloths 1 Application(s) Topical <User Schedule>  DULoxetine 90 milliGRAM(s) Oral daily  enoxaparin Injectable 65 milliGRAM(s) SubCutaneous daily  epoetin juan-epbx (RETACRIT) Injectable 37666 Unit(s) IV Push <User Schedule>  isosorbide   mononitrate ER Tablet (IMDUR) 30 milliGRAM(s) Oral daily  NIFEdipine XL 90 milliGRAM(s) Oral daily  NIFEdipine XL 60 milliGRAM(s) Oral at bedtime  pantoprazole    Tablet 40 milliGRAM(s) Oral two times a day  tamsulosin 0.4 milliGRAM(s) Oral at bedtime  torsemide 20 milliGRAM(s) Oral <User Schedule>    MEDICATIONS  (PRN):  acetaminophen   Tablet .. 650 milliGRAM(s) Oral every 6 hours PRN Temp greater or equal to 38C (100.4F), Mild Pain (1 - 3)    85 y/o M with PMHx of HTN, HLD, CAD, HFpEF, s/p Right CEA for Carotid artery disease, ESRD on HD 2d/week (M/Fri) via LUE fistula, s/p flecainide w/ ILR placed 6/2020, AS s/p TAVR, and PAD (RLE fem-pop bypass October 2020) multiple gastric AVM's s/p cauterized s/p LLE fem-pop bypass, and had the bypass performed on 3/20/21, post procedure developed L femoral DVT and s/p revision of LLE bypass 3/25/21 brought to ED with cc of b/l LE ext weakness with difficulty to ambulate for past 2 days.      # B/l LE weakness/ -Symptoms likely secondary to progressing/extensive DVT (Duplex: New extension of LEFT lower extremity thrombus into the LEFT femoral vein)    - CT head negative for acute pathology  - MRI brain: Trace bilateral frontal subdural collections suggesting subdural hematomas or subdural hygromas.  - On  Lipitor 80mg po qhs     #Left large pleural effusion - transudative; first identified on MRI spine and followed up with CT chest which confirmed  - s/p drainage 6/7 , pluerex cath placed on 6/7  - removed on 6/14  - CXR repeated on 6/15x 2 showed no reaccumulation of fluid    #Left Lower Extremity DVT  - B/L LE edema, L>R  - Venous duplex b/l LE: New extension of LLE thrombus into L femoral vein    #Elevated Troponin levels in setting of ESRD  - EKG: NSR, rate 82/min, first degree AV block   - TTE w/o WMA      #Generalized weakness, leg weakness     - Home w/ assist; home w/ home PT vs [facility] ( [facility] ? )    #PVD/HLD/HTN  - c/w statin, nifedipine &  Imdur  - Off  plavix     #ESRD on HD  - Continue HD BIW,     #Depression  - On  duloxetine    #Hx of GI bleed due to gastric AVMs,  - Monitor h/h   - Continue PPI    DISPO: family wishes acute rehab . pending auth for [facility]

## 2021-06-17 NOTE — PROGRESS NOTE ADULT - SUBJECTIVE AND OBJECTIVE BOX
Patient fatigued.   Reports no pain this AM.    REVIEW OF SYSTEMS  Constitutional - No fever,  +fatigue  Neurological - +loss of strength    FUNCTIONAL PROGRESS  6/16  Bed-Chair Transfer Training  Transfer Training Bed-to-Chair Transfer: moderate assist (50% patient effort);  2 person assist;  verbal cues;  weight-bearing as tolerated   perform chair to commode transfer. Commode to chair transfer with samson steady Max A    Sit-Stand Transfer Training  Sit-to-Stand Transfer Training Symptoms Noted During/After Treatment: anxiety  Transfer Training Sit-to-Stand Transfer: moderate assist (50% patient effort);  2 person assist;  verbal cues;  weight-bearing as tolerated   rolling walker  Transfer Training Stand-to-Sit Transfer: moderate assist (50% patient effort);  2 person assist;  verbal cues;  weight-bearing as tolerated   rolling walker  Sit-to-Stand Transfer Training Transfer Safety Analysis: decreased sequencing ability;  decreased weight-shifting ability;  decreased strength;  pain;  rolling walker    Gait Training  Gait Training: unable to perform    Sit-Stand Transfer Training  Transfer Training Sit-to-Stand Transfer: moderate assist (50% patient effort);  maximum assist (25% patient effort);  2 person assist  Transfer Training Stand-to-Sit Transfer: moderate assist (50% patient effort);  maximum assist (25% patient effort);  2 person assist  Sit-to-Stand Transfer Training Transfer Safety Analysis: impaired postural control;  impaired motor control    Toilet Transfer Training  Transfer Training Toilet Transfer: moderate assist (50% patient effort);  2 person assist;  with use of RW and stand pivot transfer  Toilet Transfer Training Transfer Safety Analysis: impaired postural control;  impaired motor control    Functional Endurance  Functional Endurance Detail: Pt completed sit to stand with moderate/maximal assist x2 and pt used Samson Lucas for two trials to pull himself up with max verbal and physical cues. Pt with fair tolerance.       VITALS  T(C): 36.6 (06-17-21 @ 09:15), Max: 36.7 (06-17-21 @ 05:42)  HR: 76 (06-17-21 @ 09:15) (70 - 76)  BP: 142/60 (06-17-21 @ 09:15) (133/48 - 163/64)  RR: 18 (06-17-21 @ 09:15) (18 - 20)  SpO2: 97% (06-17-21 @ 09:15) (93% - 100%)  Wt(kg): --    MEDICATIONS   acetaminophen   Tablet .. 650 milliGRAM(s) every 6 hours PRN  atorvastatin 80 milliGRAM(s) at bedtime  chlorhexidine 2% Cloths 1 Application(s) <User Schedule>  DULoxetine 90 milliGRAM(s) daily  enoxaparin Injectable 65 milliGRAM(s) daily  epoetin juan-epbx (RETACRIT) Injectable 11918 Unit(s) <User Schedule>  guaifenesin/dextromethorphan Oral Liquid 10 milliLiter(s) every 6 hours PRN  isosorbide   mononitrate ER Tablet (IMDUR) 30 milliGRAM(s) daily  NIFEdipine XL 90 milliGRAM(s) daily  NIFEdipine XL 60 milliGRAM(s) at bedtime  pantoprazole    Tablet 40 milliGRAM(s) two times a day  tamsulosin 0.4 milliGRAM(s) at bedtime  torsemide 20 milliGRAM(s) <User Schedule>      RECENT LABS/IMAGING                          9.3    7.90  )-----------( 271      ( 17 Jun 2021 06:58 )             31.0         MRI HEAD 6/3 - 1.  No acute infarct. 2.  Trace bilateral frontal subdural collections suggesting subdural hematomas or subdural hygromas.    BLE DOPPLER 6/3 - New extension of LEFT lower extremity thrombus into the LEFT femoral vein.    MRI Thoracic spine 6/4 1.  No evidence of epidural collection. 2.  There are T1, T2, and STIR hyperintense lesions in the T10 vertebral body and left T3 pedicle. These are nonspecific but could represent atypical hemangiomas. 3.  Large left pleural effusion. Consider CT chest.    MRI Lumbar spine 6/4 - 1.  No evidence of epidural collection. 2.  Lumbar degenerative changes. At L4-L5, there is severe spinal canal narrowing. 3.  T2 and STIR hyperintense lesions are noted in the L3 and L5 vertebral bodies, which are nonspecific but could represent atypical hemangiomas.    CT CHEST 6/6 - Moderate left pleural effusion with complete collapse left lower lobe and partial collapse lingula.    CXR 6/7 - Moderate left pleural effusion with chest tube in place and no pneumothorax. Small right pleural effusion, better characterized on CT.    CXR 6/8 - LEFT chest tube in place. Clearing of LEFT basilar airspace disease and effusion..    CXR 6/9 - Small left pleural effusion, improved    CXR 6/9 - No pleural effusion. Increased perihilar markings may represent infiltrate in the correct clinical context    CXR 6/12 - Left basilar pigtail chest tube is noted. There is an unchanged tiny left apical pneumothorax. Vague patchy airspace opacity seen within both lungs, unchanged. A loop recorder and a micra device overlie the left chest. Patient is status post TAVR. The heart size is within normal limits.    CXR 6/15 - Increased perihilar/retrocardiac markings, recommend correlation for interstitial infiltrate.    ----------------------------------------------------------------------------------------  PHYSICAL EXAM  Constitutional - NAD, Comfortable  Extremities - Left LE swelling - pitting +1  Neurologic Exam -         Motor -  As of 6/16:                    LEFT    UE - ShAB 4/5, EF 5/5, EE 5/5,  5/5                    RIGHT UE - ShAB 4/5, EF 5/5, EE 5/5,  5/5                    LEFT    LE - HF 3/5, KE 4/5, DF 2/5                     RIGHT LE - HF 3/5, KE 4/5, DF 4/5      Sensory - Intact to LT  Psychiatric - Fatigued, Depressed  ----------------------------------------------------------------------------------------  ASSESSMENT/PLAN  87yMale with functional deficits after developing BLE weakness  Left LE DVT extension - Lovenox  CAD - Lipitor  CHF - Demadex  HTN - Imdur, Procardia  ESRD - HD  Pain - Tylenol, Cymbalta  DVT PPX - SCDs  Rehab - Plan is to pursue [facility] pending auth.  Continue bedside therapy as well as OOB throughout the day with mobilization by staff to maintain cardiopulmonary function and prevention of secondary complications related to debility.     Discussed with rehab team.

## 2021-06-18 LAB
ALBUMIN SERPL ELPH-MCNC: 2.8 G/DL — LOW (ref 3.3–5.2)
ANION GAP SERPL CALC-SCNC: 11 MMOL/L — SIGNIFICANT CHANGE UP (ref 5–17)
BUN SERPL-MCNC: 64.4 MG/DL — HIGH (ref 8–20)
CALCIUM SERPL-MCNC: 9.3 MG/DL — SIGNIFICANT CHANGE UP (ref 8.6–10.2)
CHLORIDE SERPL-SCNC: 98 MMOL/L — SIGNIFICANT CHANGE UP (ref 98–107)
CO2 SERPL-SCNC: 25 MMOL/L — SIGNIFICANT CHANGE UP (ref 22–29)
CREAT SERPL-MCNC: 4.16 MG/DL — HIGH (ref 0.5–1.3)
GLUCOSE SERPL-MCNC: 120 MG/DL — HIGH (ref 70–99)
HCT VFR BLD CALC: 31 % — LOW (ref 39–50)
HGB BLD-MCNC: 9 G/DL — LOW (ref 13–17)
MCHC RBC-ENTMCNC: 28.8 PG — SIGNIFICANT CHANGE UP (ref 27–34)
MCHC RBC-ENTMCNC: 29 GM/DL — LOW (ref 32–36)
MCV RBC AUTO: 99.4 FL — SIGNIFICANT CHANGE UP (ref 80–100)
PHOSPHATE SERPL-MCNC: 3 MG/DL — SIGNIFICANT CHANGE UP (ref 2.4–4.7)
PLATELET # BLD AUTO: 276 K/UL — SIGNIFICANT CHANGE UP (ref 150–400)
POTASSIUM SERPL-MCNC: 6 MMOL/L — HIGH (ref 3.5–5.3)
POTASSIUM SERPL-SCNC: 6 MMOL/L — HIGH (ref 3.5–5.3)
RBC # BLD: 3.12 M/UL — LOW (ref 4.2–5.8)
RBC # FLD: 17.9 % — HIGH (ref 10.3–14.5)
SODIUM SERPL-SCNC: 134 MMOL/L — LOW (ref 135–145)
WBC # BLD: 9.08 K/UL — SIGNIFICANT CHANGE UP (ref 3.8–10.5)
WBC # FLD AUTO: 9.08 K/UL — SIGNIFICANT CHANGE UP (ref 3.8–10.5)

## 2021-06-18 PROCEDURE — 99232 SBSQ HOSP IP/OBS MODERATE 35: CPT

## 2021-06-18 PROCEDURE — 99233 SBSQ HOSP IP/OBS HIGH 50: CPT

## 2021-06-18 PROCEDURE — 90937 HEMODIALYSIS REPEATED EVAL: CPT

## 2021-06-18 RX ADMIN — Medication 60 MILLIGRAM(S): at 21:43

## 2021-06-18 RX ADMIN — Medication 650 MILLIGRAM(S): at 00:05

## 2021-06-18 RX ADMIN — PANTOPRAZOLE SODIUM 40 MILLIGRAM(S): 20 TABLET, DELAYED RELEASE ORAL at 17:05

## 2021-06-18 RX ADMIN — Medication 20 MILLIGRAM(S): at 09:34

## 2021-06-18 RX ADMIN — ATORVASTATIN CALCIUM 80 MILLIGRAM(S): 80 TABLET, FILM COATED ORAL at 21:43

## 2021-06-18 RX ADMIN — ERYTHROPOIETIN 10000 UNIT(S): 10000 INJECTION, SOLUTION INTRAVENOUS; SUBCUTANEOUS at 14:05

## 2021-06-18 RX ADMIN — CHLORHEXIDINE GLUCONATE 1 APPLICATION(S): 213 SOLUTION TOPICAL at 05:42

## 2021-06-18 RX ADMIN — ISOSORBIDE MONONITRATE 30 MILLIGRAM(S): 60 TABLET, EXTENDED RELEASE ORAL at 09:34

## 2021-06-18 RX ADMIN — PANTOPRAZOLE SODIUM 40 MILLIGRAM(S): 20 TABLET, DELAYED RELEASE ORAL at 05:51

## 2021-06-18 RX ADMIN — ENOXAPARIN SODIUM 65 MILLIGRAM(S): 100 INJECTION SUBCUTANEOUS at 21:42

## 2021-06-18 RX ADMIN — Medication 90 MILLIGRAM(S): at 05:51

## 2021-06-18 RX ADMIN — Medication 10 MILLILITER(S): at 05:52

## 2021-06-18 RX ADMIN — TAMSULOSIN HYDROCHLORIDE 0.4 MILLIGRAM(S): 0.4 CAPSULE ORAL at 21:43

## 2021-06-18 RX ADMIN — DULOXETINE HYDROCHLORIDE 90 MILLIGRAM(S): 30 CAPSULE, DELAYED RELEASE ORAL at 09:34

## 2021-06-18 RX ADMIN — Medication 650 MILLIGRAM(S): at 12:10

## 2021-06-18 NOTE — PROGRESS NOTE ADULT - SUBJECTIVE AND OBJECTIVE BOX
CareOne Access Code: [email or web address]  Expires: 3/3/2017 12:42 PM    CareOne  A secure, online tool to manage your health information     [employer]’s CareOne® is a secure, online tool that connects you to your personalized health information from the privacy of your home -- day or night - making it very easy for you to manage your healthcare. Once the activation process is completed, you can even access your medical information using the CareOne mell, which is available for free in the Apple Mell store or Google Play store.     CareOne provides the following levels of access (as shown below):   My Chart Features   [facility] Primary Care Doctor [facility]  Specialists [facility]  Urgent  Care Non-[facility]  Primary Care  Doctor   Email your healthcare team securely and privately 24/7 X X X X   Manage appointments: schedule your next appointment; view details of past/upcoming appointments X      Request prescription refills. X      View recent personal medical records, including lab and immunizations X X X X   View health record, including health history, allergies, medications X X X X   Read reports about your outpatient visits, procedures, consult and ER notes X X X X   See your discharge summary, which is a recap of your hospital and/or ER visit that includes your diagnosis, lab results, and care plan. X X       How to register for CareOne:  1. Go to  [email or web address].  2. Click on the Sign Up Now box, which takes you to the New Member Sign Up page. You will need to provide the following information:  a. Enter your CareOne Access Code exactly as it appears at the top of this page. (You will not need to use this code after you’ve completed the sign-up process. If you do not sign up before the expiration date, you must request a new code.)   b. Enter your date of birth.   c. Enter your home email address.   d. Click Submit, and follow the next screen’s instructions.  3. Create a CareOne ID. This will be your CareOne  Patient confused this AM.  Thinks he hasnt been served his dinner.  Redirected patient as it was breakfast time.  Patient reports he had no sleep.     REVIEW OF SYSTEMS  Constitutional - No fever,  +fatigue  Neurological - No headaches, +memory loss, +loss of strength    FUNCTIONAL PROGRESS  6/17  Sit-Stand Transfer Training  Transfer Training Sit-to-Stand Transfer: maximum assist (25% patient effort);  1 person assist;  verbal cues;  full weight-bearing   practice sit to stands x 2 with samson steady  Transfer Training Stand-to-Sit Transfer: maximum assist (25% patient effort);  1 person assist;  verbal cues;  full weight-bearing  Sit-to-Stand Transfer Training Transfer Safety Analysis: decreased sequencing ability;  decreased weight-shifting ability;  decreased strength;  pain    Toilet Transfer Training  Transfer Training Toilet Transfer: maximum assist (25% patient effort);  1 person assist;  verbal cues;  full weight-bearing   stand pivot onto commode  Toilet Transfer Training Transfer Safety Analysis: decreased sequencing ability;  decreased weight-shifting ability;  decreased strength;  pain    VITALS  T(C): 37 (06-18-21 @ 05:49), Max: 37 (06-18-21 @ 05:49)  HR: 75 (06-18-21 @ 05:49) (71 - 76)  BP: 146/53 (06-18-21 @ 05:49) (142/60 - 157/59)  RR: 18 (06-18-21 @ 05:49) (18 - 18)  SpO2: 97% (06-18-21 @ 05:49) (95% - 99%)  Wt(kg): --    MEDICATIONS   acetaminophen   Tablet .. 650 milliGRAM(s) every 6 hours PRN  atorvastatin 80 milliGRAM(s) at bedtime  chlorhexidine 2% Cloths 1 Application(s) <User Schedule>  DULoxetine 90 milliGRAM(s) daily  enoxaparin Injectable 65 milliGRAM(s) daily  epoetin juan-epbx (RETACRIT) Injectable 67688 Unit(s) <User Schedule>  guaifenesin/dextromethorphan Oral Liquid 10 milliLiter(s) every 6 hours PRN  isosorbide   mononitrate ER Tablet (IMDUR) 30 milliGRAM(s) daily  NIFEdipine XL 90 milliGRAM(s) daily  NIFEdipine XL 60 milliGRAM(s) at bedtime  pantoprazole    Tablet 40 milliGRAM(s) two times a day  tamsulosin 0.4 milliGRAM(s) at bedtime  torsemide 20 milliGRAM(s) <User Schedule>      RECENT LABS/IMAGING                          9.0    9.08  )-----------( 276      ( 18 Jun 2021 07:35 )             31.0                       MRI HEAD 6/3 - 1.  No acute infarct. 2.  Trace bilateral frontal subdural collections suggesting subdural hematomas or subdural hygromas.    BLE DOPPLER 6/3 - New extension of LEFT lower extremity thrombus into the LEFT femoral vein.    MRI Thoracic spine 6/4 1.  No evidence of epidural collection. 2.  There are T1, T2, and STIR hyperintense lesions in the T10 vertebral body and left T3 pedicle. These are nonspecific but could represent atypical hemangiomas. 3.  Large left pleural effusion. Consider CT chest.    MRI Lumbar spine 6/4 - 1.  No evidence of epidural collection. 2.  Lumbar degenerative changes. At L4-L5, there is severe spinal canal narrowing. 3.  T2 and STIR hyperintense lesions are noted in the L3 and L5 vertebral bodies, which are nonspecific but could represent atypical hemangiomas.    CT CHEST 6/6 - Moderate left pleural effusion with complete collapse left lower lobe and partial collapse lingula.    CXR 6/7 - Moderate left pleural effusion with chest tube in place and no pneumothorax. Small right pleural effusion, better characterized on CT.    CXR 6/8 - LEFT chest tube in place. Clearing of LEFT basilar airspace disease and effusion..    CXR 6/9 - Small left pleural effusion, improved    CXR 6/9 - No pleural effusion. Increased perihilar markings may represent infiltrate in the correct clinical context    CXR 6/12 - Left basilar pigtail chest tube is noted. There is an unchanged tiny left apical pneumothorax. Vague patchy airspace opacity seen within both lungs, unchanged. A loop recorder and a micra device overlie the left chest. Patient is status post TAVR. The heart size is within normal limits.    CXR 6/15 - Increased perihilar/retrocardiac markings, recommend correlation for interstitial infiltrate.    CXR 6/17 - Small left pleural effusion  ----------------------------------------------------------------------------------------  PHYSICAL EXAM  Constitutional - NAD, Comfortable  Extremities - Improved swelling  Neurologic Exam -         Motor -                       LEFT    UE - ShAB 4/5, EF 5/5, EE 5/5,  5/5                    RIGHT UE - ShAB 4/5, EF 5/5, EE 5/5,  5/5                    LEFT    LE - HF 3/5, KE 4/5, DF 2/5                     RIGHT LE - HF 3/5, KE 4/5, DF 4/5      Sensory - Intact to LT  Psychiatric - Fatigued   ----------------------------------------------------------------------------------------  ASSESSMENT/PLAN  87yMale with functional deficits after developing BLE weakness  Left LE DVT extension - Lovenox  CAD - Lipitor  CHF - Demadex  HTN - Imdur, Procardia  ESRD - HD  Pain - Tylenol, Cymbalta  DVT PPX - SCDs  Rehab - Pending P2P by hospitalist to assist with need for medical oversight at [facility]. SPoke to daughter yesterday and is aware. Continue bedside therapy as well as OOB throughout the day with mobilization by staff to maintain cardiopulmonary function and prevention of secondary complications related to debility.     Discussed with rehab team.    login ID and cannot be changed, so think of one that is secure and easy to remember.  4. Create a [employer] password. You can change your password at any time.  5. Enter your Password Reset Question and Answer. This can be used at a later time if you forget your password.   6. Enter your e-mail address. This allows you to receive e-mail notifications when new information is available in [employer].  7. Click Sign Up. You can now view your health information.    For assistance activating your [employer] account, call [phone]

## 2021-06-18 NOTE — PROGRESS NOTE ADULT - SUBJECTIVE AND OBJECTIVE BOX
[facility] DIVISION OF KIDNEY DISEASES AND HYPERTENSION -- FOLLOW UP NOTE  --------------------------------------------------------------------------------  Chief Complaint:  ESRD HD    24 hour events/subjective:  Seen/examined  HD today      PAST HISTORY  --------------------------------------------------------------------------------  No significant changes to PMH, PSH, FHx, SHx, unless otherwise noted    ALLERGIES & MEDICATIONS  --------------------------------------------------------------------------------  Allergies    Plavix (Hives)  Toprol-XL (Rash)    Intolerances      Standing Inpatient Medications  atorvastatin 80 milliGRAM(s) Oral at bedtime  chlorhexidine 2% Cloths 1 Application(s) Topical <User Schedule>  DULoxetine 90 milliGRAM(s) Oral daily  enoxaparin Injectable 65 milliGRAM(s) SubCutaneous daily  epoetin juan-epbx (RETACRIT) Injectable 39673 Unit(s) IV Push <User Schedule>  isosorbide   mononitrate ER Tablet (IMDUR) 30 milliGRAM(s) Oral daily  NIFEdipine XL 60 milliGRAM(s) Oral at bedtime  NIFEdipine XL 90 milliGRAM(s) Oral daily  pantoprazole    Tablet 40 milliGRAM(s) Oral two times a day  tamsulosin 0.4 milliGRAM(s) Oral at bedtime  torsemide 20 milliGRAM(s) Oral <User Schedule>    PRN Inpatient Medications  acetaminophen   Tablet .. 650 milliGRAM(s) Oral every 6 hours PRN  guaifenesin/dextromethorphan Oral Liquid 10 milliLiter(s) Oral every 6 hours PRN      REVIEW OF SYSTEMS  --------------------------------------------------------------------------------  Gen: No weight changes, fatigue, fevers/chills, weakness  Skin: No rashes  Head/Eyes/Ears/Mouth: No headache; Normal hearing; Normal vision w/o blurriness; No sinus pain/discomfort, sore throat  Respiratory: No dyspnea, cough, wheezing, hemoptysis  CV: No chest pain, PND, orthopnea  GI: No abdominal pain, diarrhea, constipation, nausea, vomiting, melena, hematochezia  : No increased frequency, dysuria, hematuria, nocturia  MSK: No joint pain/swelling; no back pain; no edema  Neuro: No dizziness/lightheadedness, weakness, seizures, numbness, tingling  Heme: No easy bruising or bleeding  Endo: No heat/cold intolerance  Psych: No significant nervousness, anxiety, stress, depression    All other systems were reviewed and are negative, except as noted.    VITALS/PHYSICAL EXAM  --------------------------------------------------------------------------------  T(C): 36.3 (06-18-21 @ 15:20), Max: 37 (06-18-21 @ 05:49)  HR: 70 (06-18-21 @ 15:20) (70 - 77)  BP: 120/63 (06-18-21 @ 15:20) (120/63 - 157/59)  RR: 18 (06-18-21 @ 15:20) (18 - 18)  SpO2: 98% (06-18-21 @ 15:20) (94% - 99%)  Wt(kg): --        06-17-21 @ 07:01  -  06-18-21 @ 07:00  --------------------------------------------------------  IN: 0 mL / OUT: 800 mL / NET: -800 mL      Physical Exam:  General: NAD, calm, cooperative, Pale,   Lungs: CTAB, chest tube removed  Heart: irregular rate  Abdomen: NT, ND, soft  Neuro: alert and oriented to person, place and time; RLE 4/5 compared to LLE which is 3/5 strength; no slurred speech noted; follows commands  Skin: intact    LABS/STUDIES  --------------------------------------------------------------------------------              9.0    9.08  >-----------<  276      [06-18-21 @ 07:35]              31.0     134  |  98  |  64.4  ----------------------------<  120      [06-18-21 @ 12:23]  6.0   |  25.0  |  4.16        Ca     9.3     [06-18-21 @ 12:23]      Phos  3.0     [06-18-21 @ 12:23]    TPro  x   /  Alb  2.8  /  TBili  x   /  DBili  x   /  AST  x   /  ALT  x   /  AlkPhos  x   [06-18-21 @ 12:23]          Creatinine Trend:  SCr 4.16 [06-18 @ 12:23]  SCr 4.14 [06-14 @ 05:56]  SCr 4.86 [06-11 @ 06:07]  SCr 4.55 [06-10 @ 06:22]  SCr 3.80 [06-09 @ 06:01]    Urinalysis - [06-04-21 @ 07:54]      Color Yellow / Appearance Clear / SG 1.010 / pH 8.0      Gluc Negative / Ketone Negative  / Bili Negative / Urobili Negative       Blood Large / Protein 100 / Leuk Est Negative / Nitrite Negative      RBC 3-5 / WBC 3-5 / Hyaline  / Gran  / Sq Epi  / Non Sq Epi Negative / Bacteria Negative      Iron 53, TIBC 266, %sat 20      [08-19-20 @ 06:32]  Ferritin 184      [08-19-20 @ 06:32]  PTH -- (Ca 9.6)      [08-19-20 @ 16:08]   91  Vitamin D (25OH) 26.4      [08-19-20 @ 16:08]  HbA1c 4.9      [08-09-18 @ 05:54]  TSH 2.16      [09-15-20 @ 02:01]  Lipid: chol 126, , HDL 60, LDL --      [06-03-21 @ 06:07]    HBsAb Nonreact      [06-17-21 @ 11:47]  HBsAg Nonreact      [06-17-21 @ 11:47]  HCV 0.13, Nonreact      [06-17-21 @ 11:47]

## 2021-06-18 NOTE — PROGRESS NOTE ADULT - ASSESSMENT
87 y/o M with PMHx of HTN, HLD, CAD, HFpEF, s/p Right CEA for Carotid artery disease, ESRD on HD 2d/week (M/Fri) via LUE fistula, s/p flecainide w/ ILR placed 6/2020, AS s/p TAVR, and PAD (RLE fem-pop bypass October 2020) multiple gastric AVM's s/p cauterized s/p LLE fem-pop bypass, and had the bypass performed on 3/20/21, post procedure developed L femoral DVT and s/p revision of LLE bypass 3/25/21 brought to ED with cc of b/l LE ext weakness with difficulty to ambulate for 2 days prior to admission.      for week of 6/12 - 6/18:  Patient was awaiting acute rehab placement which was denied on 6/17. [id number] was denied on 6/18. Currently awaiting [facility]. Chest tube was removed by CT surgery on 6/14.    # B/l LE weakness - Symptoms likely secondary to progressing/extensive DVT (Duplex: New extension of LEFT lower extremity thrombus into the LEFT femoral vein)  - Acute CVA ruled out  - CT head negative for acute pathology  - MRI brain: Trace bilateral frontal subdural collections suggesting subdural hematomas or subdural hygromas.  - Seen in consult by neuro and neurosurgical teams  - MRI C/T/L spine ordered by neurosurgery, no acute actionable findings   - Continue Lipitor 80mg po qhs   - dc  Plavix as per  vascular team due to high bleeding risk    - changed to renal dosed lovenox  - so far tolerating well    #Left large pleural effusion - transudative; first identified on MRI spine and followed up with CT chest which confirmed  - s/p drainage 6/7 , pluerx cath placed on 6/7  - removed on 6/14  - CXR repeated on 6/15x2 showed no reaccumulation of fluid    #cough - overall much improved  - I believe this is from manipulation of the pleural space  - repeat CXR without changes to suggest reaccumulation of fluid or infiltrative process  - will monitor clinically for signs/symptoms of fever/chills or increased WBC that would suggest pneumonia  - discussed with daughter    #Left Lower Extremity DVT  - B/L LE edema, L>R  - Venous duplex b/l LE: New extension of LLE thrombus into L femoral vein  - MRI head with subdural collection suggestive of subdural hematoma or hygroma  - MRI spine with no acute actionable findings  - Vascular surgery consult appreciated: no IVC filter recommended at this time   - renal dose lovenox  - dc plavix     #Elevated Troponin levels in setting of ESRD  - Asymptomatic   - EKG: NSR, rate 82/min, first degree AV block   - TTE w/o WMA  -  Continue telemetry monitoring   - Cardiology consult appreciated, no further recs/work up     #Generalized weakness, leg weakness     - PT eval appreciated: home w/ assist; home w/ home PT vs [facility]  - family wishing acute rehab     #PVD/HLD/HTN  - c/w statin, nifedipine Imdur  - dcd plavix     #ESRD on HD  - Continue HD  - Nephrology following     #Depression  - Continue duloxetine    #Hx of GI bleed due to gastric AVMs, follows with dr. barrera as op   - Monitor h/h   - Continue PPI    DISPO: did not get auth for [facility]; [id number] did not get approved; patient for planning to discharge to [facility] likely

## 2021-06-18 NOTE — PROGRESS NOTE ADULT - SUBJECTIVE AND OBJECTIVE BOX
CC: Lower extremity weakness and elevated trop level (11 Jun 2021 17:12)    INTERVAL HPI/OVERNIGHT EVENTS:  no acute events overnight  without acute complaints    Vital Signs Last 24 Hrs  T(C): 36.4 (18 Jun 2021 11:55), Max: 37 (18 Jun 2021 05:49)  T(F): 97.6 (18 Jun 2021 11:55), Max: 98.6 (18 Jun 2021 05:49)  HR: 72 (18 Jun 2021 11:55) (71 - 77)  BP: 139/55 (18 Jun 2021 11:55) (139/55 - 157/59)  BP(mean): --  RR: 18 (18 Jun 2021 11:55) (18 - 18)  SpO2: 99% (18 Jun 2021 11:55) (94% - 99%)    EXAM:  General: NAD, calm, cooperative  Lungs: CTAB, chest tube removed  Heart: irregular rate  Abdomen: NT, ND, soft  Neuro: alert and oriented to person, place and time; RLE 4/5 compared to LLE which is 3/5 strength; no slurred speech noted; follows commands  Skin: intact            06-18    134<L>  |  98  |  64.4<H>  ----------------------------<  120<H>  6.0<H>   |  25.0  |  4.16<H>    Ca    9.3      18 Jun 2021 12:23  Phos  3.0     06-18    TPro  x   /  Alb  2.8<L>  /  TBili  x   /  DBili  x   /  AST  x   /  ALT  x   /  AlkPhos  x   06-18                          9.0    9.08  )-----------( 276      ( 18 Jun 2021 07:35 )             31.0     MEDICATIONS  (STANDING):  atorvastatin 80 milliGRAM(s) Oral at bedtime  chlorhexidine 2% Cloths 1 Application(s) Topical <User Schedule>  DULoxetine 90 milliGRAM(s) Oral daily  enoxaparin Injectable 65 milliGRAM(s) SubCutaneous daily  epoetin juan-epbx (RETACRIT) Injectable 49620 Unit(s) IV Push <User Schedule>  isosorbide   mononitrate ER Tablet (IMDUR) 30 milliGRAM(s) Oral daily  NIFEdipine XL 90 milliGRAM(s) Oral daily  NIFEdipine XL 60 milliGRAM(s) Oral at bedtime  pantoprazole    Tablet 40 milliGRAM(s) Oral two times a day  tamsulosin 0.4 milliGRAM(s) Oral at bedtime  torsemide 20 milliGRAM(s) Oral <User Schedule>    MEDICATIONS  (PRN):  acetaminophen   Tablet .. 650 milliGRAM(s) Oral every 6 hours PRN Temp greater or equal to 38C (100.4F), Mild Pain (1 - 3)  guaifenesin/dextromethorphan Oral Liquid 10 milliLiter(s) Oral every 6 hours PRN Cough      RADIOLOGY & ADDITIONAL TESTS:

## 2021-06-18 NOTE — PROGRESS NOTE ADULT - ASSESSMENT
ESRD on HD  Left moderate pleural effusion s/p thoracentesis  LLE DVT  Anemia of CKD    Continue HD MF schedule  Hgb.,  low, continue AMY     dw Dr Wing

## 2021-06-19 LAB
ANION GAP SERPL CALC-SCNC: 12 MMOL/L — SIGNIFICANT CHANGE UP (ref 5–17)
BUN SERPL-MCNC: 31 MG/DL — HIGH (ref 8–20)
CALCIUM SERPL-MCNC: 9 MG/DL — SIGNIFICANT CHANGE UP (ref 8.6–10.2)
CHLORIDE SERPL-SCNC: 102 MMOL/L — SIGNIFICANT CHANGE UP (ref 98–107)
CO2 SERPL-SCNC: 26 MMOL/L — SIGNIFICANT CHANGE UP (ref 22–29)
CREAT SERPL-MCNC: 2.64 MG/DL — HIGH (ref 0.5–1.3)
GLUCOSE SERPL-MCNC: 84 MG/DL — SIGNIFICANT CHANGE UP (ref 70–99)
HCT VFR BLD CALC: 30.4 % — LOW (ref 39–50)
HGB BLD-MCNC: 9 G/DL — LOW (ref 13–17)
MCHC RBC-ENTMCNC: 28.8 PG — SIGNIFICANT CHANGE UP (ref 27–34)
MCHC RBC-ENTMCNC: 29.6 GM/DL — LOW (ref 32–36)
MCV RBC AUTO: 97.4 FL — SIGNIFICANT CHANGE UP (ref 80–100)
PLATELET # BLD AUTO: 275 K/UL — SIGNIFICANT CHANGE UP (ref 150–400)
POTASSIUM SERPL-MCNC: 4.6 MMOL/L — SIGNIFICANT CHANGE UP (ref 3.5–5.3)
POTASSIUM SERPL-SCNC: 4.6 MMOL/L — SIGNIFICANT CHANGE UP (ref 3.5–5.3)
RBC # BLD: 3.12 M/UL — LOW (ref 4.2–5.8)
RBC # FLD: 17.9 % — HIGH (ref 10.3–14.5)
SODIUM SERPL-SCNC: 140 MMOL/L — SIGNIFICANT CHANGE UP (ref 135–145)
WBC # BLD: 8.47 K/UL — SIGNIFICANT CHANGE UP (ref 3.8–10.5)
WBC # FLD AUTO: 8.47 K/UL — SIGNIFICANT CHANGE UP (ref 3.8–10.5)

## 2021-06-19 PROCEDURE — 99232 SBSQ HOSP IP/OBS MODERATE 35: CPT

## 2021-06-19 PROCEDURE — 99233 SBSQ HOSP IP/OBS HIGH 50: CPT

## 2021-06-19 RX ADMIN — Medication 90 MILLIGRAM(S): at 05:50

## 2021-06-19 RX ADMIN — ISOSORBIDE MONONITRATE 30 MILLIGRAM(S): 60 TABLET, EXTENDED RELEASE ORAL at 16:15

## 2021-06-19 RX ADMIN — DULOXETINE HYDROCHLORIDE 90 MILLIGRAM(S): 30 CAPSULE, DELAYED RELEASE ORAL at 16:15

## 2021-06-19 RX ADMIN — PANTOPRAZOLE SODIUM 40 MILLIGRAM(S): 20 TABLET, DELAYED RELEASE ORAL at 16:15

## 2021-06-19 RX ADMIN — PANTOPRAZOLE SODIUM 40 MILLIGRAM(S): 20 TABLET, DELAYED RELEASE ORAL at 05:50

## 2021-06-19 RX ADMIN — TAMSULOSIN HYDROCHLORIDE 0.4 MILLIGRAM(S): 0.4 CAPSULE ORAL at 21:36

## 2021-06-19 RX ADMIN — ATORVASTATIN CALCIUM 80 MILLIGRAM(S): 80 TABLET, FILM COATED ORAL at 21:36

## 2021-06-19 RX ADMIN — Medication 60 MILLIGRAM(S): at 21:36

## 2021-06-19 RX ADMIN — ENOXAPARIN SODIUM 65 MILLIGRAM(S): 100 INJECTION SUBCUTANEOUS at 21:36

## 2021-06-19 RX ADMIN — CHLORHEXIDINE GLUCONATE 1 APPLICATION(S): 213 SOLUTION TOPICAL at 05:50

## 2021-06-19 NOTE — PROGRESS NOTE ADULT - SUBJECTIVE AND OBJECTIVE BOX
CC: Lower extremity weakness and elevated trop level (11 Jun 2021 17:12)    INTERVAL HPI/OVERNIGHT EVENTS: awake , alert x 3 , in no acute distress. not very willing to work with pt , no fever chills.     Vital Signs Last 24 Hrs  T(C): 37 (19 Jun 2021 04:49), Max: 37 (19 Jun 2021 04:49)  T(F): 98.6 (19 Jun 2021 04:49), Max: 98.6 (19 Jun 2021 04:49)  HR: 72 (19 Jun 2021 16:05) (72 - 76)  BP: 165/60 (19 Jun 2021 16:05) (135/55 - 165/60)  BP(mean): --  RR: 16 (19 Jun 2021 04:49) (16 - 18)  SpO2: 98% (19 Jun 2021 04:49) (98% - 98%)      EXAM:  General: NAD, calm, cooperative  Lungs: CTAB, chest tube removed  Heart: irregular rate  Abdomen: NT, ND, soft  Neuro: alert and oriented to person, place and time; RLE 4/5 compared to LLE which is 3/5 strength; no slurred speech noted; follows commands                            9.0    8.47  )-----------( 275      ( 19 Jun 2021 06:27 )             30.4   06-19    140  |  102  |  31.0<H>  ----------------------------<  84  4.6   |  26.0  |  2.64<H>    Ca    9.0      19 Jun 2021 06:26  Phos  3.0     06-18    TPro  x   /  Alb  2.8<L>  /  TBili  x   /  DBili  x   /  AST  x   /  ALT  x   /  AlkPhos  x   06-18      < from: Xray Chest 1 View- PORTABLE-Routine (Xray Chest 1 View- PORTABLE-Routine in AM.) (06.17.21 @ 16:04) >  IMPRESSION:  Small left pleural effusion

## 2021-06-19 NOTE — PROGRESS NOTE ADULT - ASSESSMENT
ESRD on HD  Left moderate pleural effusion s/p thoracentesis  LLE DVT  Anemia of CKD    Continue HD MF schedule  Hgb.,  low, continue AMY     dw Dr Riggins

## 2021-06-19 NOTE — PROGRESS NOTE ADULT - ASSESSMENT
87 y/o M with PMHx of HTN, HLD, CAD, HFpEF, s/p Right CEA for Carotid artery disease, ESRD on HD 2d/week (M/Fri) via LUE fistula, s/p flecainide w/ ILR placed 6/2020, AS s/p TAVR, and PAD (RLE fem-pop bypass October 2020) multiple gastric AVM's s/p cauterized s/p LLE fem-pop bypass, and had the bypass performed on 3/20/21, post procedure developed L femoral DVT and s/p revision of LLE bypass 3/25/21 brought to ED with cc of b/l LE ext weakness with difficulty to ambulate for 2 days prior to admission.   awaiting acute rehab placement which was denied on 6/17. [id number] was denied on 6/18. Currently awaiting L[facility]NE. Chest tube was removed by CT surgery on 6/14.    # B/l LE weakness - Symptoms likely secondary to progressing/extensive DVT (Duplex: New extension of LEFT lower extremity thrombus into the LEFT femoral vein)  - Acute CVA ruled out  - CT head negative for acute pathology  - MRI brain: Trace bilateral frontal subdural collections suggesting subdural hematomas or subdural hygromas.  - Seen in consult by neuro and neurosurgical teams  - MRI C/T/L spine ordered by neurosurgery, no acute actionable findings   - Continue Lipitor 80mg po qhs   - dc  Plavix as per  vascular team due to high bleeding risk    - changed to renal dosed lovenox    #Left large pleural effusion - transudative; first identified on MRI spine and followed up with CT chest which confirmed  - s/p drainage 6/7 , pluerx cath placed on 6/7  - removed on 6/14  - CXR repeated shows only samll left effusion     #cough - overall much improved  - I believe this is from manipulation of the pleural space  - repeat CXR without changes to suggest reaccumulation of fluid or infiltrative process  - will monitor clinically for signs/symptoms of fever/chills or increased WBC that would suggest pneumonia    #Left Lower Extremity DVT  - B/L LE edema, L>R  - Venous duplex b/l LE: New extension of LLE thrombus into L femoral vein  - MRI head with subdural collection suggestive of subdural hematoma or hygroma  - MRI spine with no acute actionable findings  - Vascular surgery consult appreciated: no IVC filter recommended at this time   - renal dose lovenox  - dcd plavix     #Elevated Troponin levels in setting of ESRD  - Asymptomatic   - EKG: NSR, rate 82/min, first degree AV block   - TTE w/o WMA  -  Continue telemetry monitoring   - Cardiology consult appreciated, no further recs/work up     #Generalized weakness, leg weakness     - PT eval appreciated: home w/ assist; home w/ home PT vs L[facility]NE  - family wishing acute rehab     #PVD/HLD/HTN  - c/w statin, nifedipine Imdur  - dcd plavix     #ESRD on HD  - Continue HD  - Nephrology following     #Depression  - Continue duloxetine    #Hx of GI bleed due to gastric AVMs, follows with dr. barrera as op   - Monitor h/h   - Continue PPI    DISPO: did not get auth for AR; [id number] did not get approved; patient for planning to discharge to [facility][1] Rehab likely

## 2021-06-19 NOTE — PROGRESS NOTE ADULT - SUBJECTIVE AND OBJECTIVE BOX
[facility] DIVISION OF KIDNEY DISEASES AND HYPERTENSION -- FOLLOW UP NOTE  --------------------------------------------------------------------------------  Chief Complaint:  ESRD HD  24 hour events/subjective:  Seen/examined this AM  HD MF      PAST HISTORY  --------------------------------------------------------------------------------  No significant changes to PMH, PSH, FHx, SHx, unless otherwise noted    ALLERGIES & MEDICATIONS  --------------------------------------------------------------------------------  Allergies    Plavix (Hives)  Toprol-XL (Rash)    Intolerances      Standing Inpatient Medications  atorvastatin 80 milliGRAM(s) Oral at bedtime  chlorhexidine 2% Cloths 1 Application(s) Topical <User Schedule>  DULoxetine 90 milliGRAM(s) Oral daily  enoxaparin Injectable 65 milliGRAM(s) SubCutaneous daily  epoetin juan-epbx (RETACRIT) Injectable 35977 Unit(s) IV Push <User Schedule>  isosorbide   mononitrate ER Tablet (IMDUR) 30 milliGRAM(s) Oral daily  NIFEdipine XL 90 milliGRAM(s) Oral daily  NIFEdipine XL 60 milliGRAM(s) Oral at bedtime  pantoprazole    Tablet 40 milliGRAM(s) Oral two times a day  tamsulosin 0.4 milliGRAM(s) Oral at bedtime  torsemide 20 milliGRAM(s) Oral <User Schedule>    PRN Inpatient Medications  acetaminophen   Tablet .. 650 milliGRAM(s) Oral every 6 hours PRN  guaifenesin/dextromethorphan Oral Liquid 10 milliLiter(s) Oral every 6 hours PRN      REVIEW OF SYSTEMS  --------------------------------------------------------------------------------  Gen: No weight changes, fatigue, fevers/chills, weakness  Skin: No rashes  Head/Eyes/Ears/Mouth: No headache; Normal hearing; Normal vision w/o blurriness; No sinus pain/discomfort, sore throat  Respiratory: No dyspnea, cough, wheezing, hemoptysis  CV: No chest pain, PND, orthopnea  GI: No abdominal pain, diarrhea, constipation, nausea, vomiting, melena, hematochezia  : No increased frequency, dysuria, hematuria, nocturia  MSK: No joint pain/swelling; no back pain; no edema  Neuro: No dizziness/lightheadedness, weakness, seizures, numbness, tingling  Heme: No easy bruising or bleeding  Endo: No heat/cold intolerance  Psych: No significant nervousness, anxiety, stress, depression    All other systems were reviewed and are negative, except as noted.    VITALS/PHYSICAL EXAM  --------------------------------------------------------------------------------  T(C): 37 (06-19-21 @ 04:49), Max: 37 (06-19-21 @ 04:49)  HR: 72 (06-19-21 @ 16:05) (72 - 76)  BP: 165/60 (06-19-21 @ 16:05) (135/55 - 165/60)  RR: 16 (06-19-21 @ 04:49) (16 - 18)  SpO2: 98% (06-19-21 @ 04:49) (98% - 98%)  Wt(kg): --        06-18-21 @ 07:01  -  06-19-21 @ 07:00  --------------------------------------------------------  IN: 960 mL / OUT: 1500 mL / NET: -540 mL      Physical Exam:  General: NAD, calm, cooperative, Pale,   Lungs: CTAB  Heart: irregular rate  Abdomen: NT, ND, soft  Neuro: alert and oriented to person, place and time;   Skin: intact    LABS/STUDIES  --------------------------------------------------------------------------------              9.0    8.47  >-----------<  275      [06-19-21 @ 06:27]              30.4     140  |  102  |  31.0  ----------------------------<  84      [06-19-21 @ 06:26]  4.6   |  26.0  |  2.64        Ca     9.0     [06-19-21 @ 06:26]      Phos  3.0     [06-18-21 @ 12:23]    TPro  x   /  Alb  2.8  /  TBili  x   /  DBili  x   /  AST  x   /  ALT  x   /  AlkPhos  x   [06-18-21 @ 12:23]          Creatinine Trend:  SCr 2.64 [06-19 @ 06:26]  SCr 4.16 [06-18 @ 12:23]  SCr 4.14 [06-14 @ 05:56]  SCr 4.86 [06-11 @ 06:07]  SCr 4.55 [06-10 @ 06:22]    Urinalysis - [06-04-21 @ 07:54]      Color Yellow / Appearance Clear / SG 1.010 / pH 8.0      Gluc Negative / Ketone Negative  / Bili Negative / Urobili Negative       Blood Large / Protein 100 / Leuk Est Negative / Nitrite Negative      RBC 3-5 / WBC 3-5 / Hyaline  / Gran  / Sq Epi  / Non Sq Epi Negative / Bacteria Negative      Iron 53, TIBC 266, %sat 20      [08-19-20 @ 06:32]  Ferritin 184      [08-19-20 @ 06:32]  PTH -- (Ca 9.6)      [08-19-20 @ 16:08]   91  Vitamin D (25OH) 26.4      [08-19-20 @ 16:08]  HbA1c 4.9      [08-09-18 @ 05:54]  TSH 2.16      [09-15-20 @ 02:01]  Lipid: chol 126, , HDL 60, LDL --      [06-03-21 @ 06:07]    HBsAb Nonreact      [06-17-21 @ 11:47]  HBsAg Nonreact      [06-17-21 @ 11:47]  HCV 0.13, Nonreact      [06-17-21 @ 11:47]

## 2021-06-20 LAB
ANION GAP SERPL CALC-SCNC: 10 MMOL/L — SIGNIFICANT CHANGE UP (ref 5–17)
BUN SERPL-MCNC: 48.3 MG/DL — HIGH (ref 8–20)
CALCIUM SERPL-MCNC: 9.3 MG/DL — SIGNIFICANT CHANGE UP (ref 8.6–10.2)
CHLORIDE SERPL-SCNC: 101 MMOL/L — SIGNIFICANT CHANGE UP (ref 98–107)
CO2 SERPL-SCNC: 28 MMOL/L — SIGNIFICANT CHANGE UP (ref 22–29)
CREAT SERPL-MCNC: 3.32 MG/DL — HIGH (ref 0.5–1.3)
GLUCOSE SERPL-MCNC: 121 MG/DL — HIGH (ref 70–99)
HCT VFR BLD CALC: 30.4 % — LOW (ref 39–50)
HGB BLD-MCNC: 8.9 G/DL — LOW (ref 13–17)
MCHC RBC-ENTMCNC: 29.2 PG — SIGNIFICANT CHANGE UP (ref 27–34)
MCHC RBC-ENTMCNC: 29.3 GM/DL — LOW (ref 32–36)
MCV RBC AUTO: 99.7 FL — SIGNIFICANT CHANGE UP (ref 80–100)
PLATELET # BLD AUTO: 261 K/UL — SIGNIFICANT CHANGE UP (ref 150–400)
POTASSIUM SERPL-MCNC: 5.1 MMOL/L — SIGNIFICANT CHANGE UP (ref 3.5–5.3)
POTASSIUM SERPL-SCNC: 5.1 MMOL/L — SIGNIFICANT CHANGE UP (ref 3.5–5.3)
RBC # BLD: 3.05 M/UL — LOW (ref 4.2–5.8)
RBC # FLD: 17.7 % — HIGH (ref 10.3–14.5)
SARS-COV-2 RNA SPEC QL NAA+PROBE: SIGNIFICANT CHANGE UP
SODIUM SERPL-SCNC: 139 MMOL/L — SIGNIFICANT CHANGE UP (ref 135–145)
WBC # BLD: 7.61 K/UL — SIGNIFICANT CHANGE UP (ref 3.8–10.5)
WBC # FLD AUTO: 7.61 K/UL — SIGNIFICANT CHANGE UP (ref 3.8–10.5)

## 2021-06-20 PROCEDURE — 99233 SBSQ HOSP IP/OBS HIGH 50: CPT

## 2021-06-20 PROCEDURE — 99232 SBSQ HOSP IP/OBS MODERATE 35: CPT

## 2021-06-20 RX ADMIN — ATORVASTATIN CALCIUM 80 MILLIGRAM(S): 80 TABLET, FILM COATED ORAL at 22:12

## 2021-06-20 RX ADMIN — PANTOPRAZOLE SODIUM 40 MILLIGRAM(S): 20 TABLET, DELAYED RELEASE ORAL at 05:17

## 2021-06-20 RX ADMIN — Medication 20 MILLIGRAM(S): at 08:44

## 2021-06-20 RX ADMIN — ENOXAPARIN SODIUM 65 MILLIGRAM(S): 100 INJECTION SUBCUTANEOUS at 22:11

## 2021-06-20 RX ADMIN — Medication 60 MILLIGRAM(S): at 22:12

## 2021-06-20 RX ADMIN — Medication 90 MILLIGRAM(S): at 05:24

## 2021-06-20 RX ADMIN — DULOXETINE HYDROCHLORIDE 90 MILLIGRAM(S): 30 CAPSULE, DELAYED RELEASE ORAL at 11:48

## 2021-06-20 RX ADMIN — ISOSORBIDE MONONITRATE 30 MILLIGRAM(S): 60 TABLET, EXTENDED RELEASE ORAL at 11:48

## 2021-06-20 RX ADMIN — CHLORHEXIDINE GLUCONATE 1 APPLICATION(S): 213 SOLUTION TOPICAL at 05:24

## 2021-06-20 RX ADMIN — TAMSULOSIN HYDROCHLORIDE 0.4 MILLIGRAM(S): 0.4 CAPSULE ORAL at 22:12

## 2021-06-20 NOTE — PROGRESS NOTE ADULT - ASSESSMENT
85 y/o M with PMHx of HTN, HLD, CAD, HFpEF, s/p Right CEA for Carotid artery disease, ESRD on HD 2d/week (M/Fri) via LUE fistula, s/p flecainide w/ ILR placed 6/2020, AS s/p TAVR, and PAD (RLE fem-pop bypass October 2020) multiple gastric AVM's s/p cauterized s/p LLE fem-pop bypass, and had the bypass performed on 3/20/21, post procedure developed L femoral DVT and s/p revision of LLE bypass 3/25/21 brought to ED with cc of b/l LE ext weakness with difficulty to ambulate for 2 days prior to admission.   awaiting acute rehab placement which was denied on 6/17. [id number] was denied on 6/18. Currently awaiting [facility]. Chest tube was removed by CT surgery on 6/14.    # B/l LE weakness - Symptoms likely secondary to progressing/extensive DVT (Duplex: New extension of LEFT lower extremity thrombus into the LEFT femoral vein)  - Acute CVA ruled out  - CT head negative for acute pathology  - MRI brain: Trace bilateral frontal subdural collections suggesting subdural hematomas or subdural hygromas.  - Seen in consult by neuro and neurosurgical teams  - MRI C/T/L spine ordered by neurosurgery, no acute actionable findings   - Continue Lipitor 80mg po qhs   - dc  Plavix as per  vascular team due to high bleeding risk    - changed to renal dosed lovenox    #Left large pleural effusion - transudative; first identified on MRI spine and followed up with CT chest which confirmed  - s/p drainage 6/7 , pluerx cath placed on 6/7  - removed on 6/14  - CXR repeated shows only samll left effusion     #cough - overall much improved  - I believe this is from manipulation of the pleural space  - repeat CXR without changes to suggest reaccumulation of fluid or infiltrative process  - will monitor clinically for signs/symptoms of fever/chills or increased WBC that would suggest pneumonia    #Left Lower Extremity DVT  - B/L LE edema, L>R  - Venous duplex b/l LE: New extension of LLE thrombus into L femoral vein  - MRI head with subdural collection suggestive of subdural hematoma or hygroma  - MRI spine with no acute actionable findings  - Vascular surgery consult appreciated: no IVC filter recommended at this time   - renal dose lovenox  - dcd plavix     #Elevated Troponin levels in setting of ESRD  - Asymptomatic   - EKG: NSR, rate 82/min, first degree AV block   - TTE w/o WMA  -  Continue telemetry monitoring   - Cardiology consult appreciated, no further recs/work up     #Generalized weakness, leg weakness     - PT eval appreciated: home w/ assist; home w/ home PT vs [facility]  - family wishing acute rehab     #PVD/HLD/HTN  - c/w statin, nifedipine Imdur  - dcd plavix     #ESRD on HD  - Continue HD  - Nephrology following     #Depression  - Continue duloxetine    #Hx of GI bleed due to gastric AVMs, follows with dr. barrera as op   - Monitor h/h   - Continue PPI    #dvt ppx: on sq lovenox     DISPO: awaiting rainer , plan of care discussed with patient and daughter

## 2021-06-20 NOTE — PROGRESS NOTE ADULT - SUBJECTIVE AND OBJECTIVE BOX
Left message for patient to call back to providers office at [phone]     [facility] DIVISION OF KIDNEY DISEASES AND HYPERTENSION -- FOLLOW UP NOTE  --------------------------------------------------------------------------------  Chief Complaint:  ESRD HD  24 hour events/subjective:  Seen/examined  HD MF      PAST HISTORY  --------------------------------------------------------------------------------  No significant changes to PMH, PSH, FHx, SHx, unless otherwise noted    ALLERGIES & MEDICATIONS  --------------------------------------------------------------------------------  Allergies    Plavix (Hives)  Toprol-XL (Rash)    Intolerances      Standing Inpatient Medications  atorvastatin 80 milliGRAM(s) Oral at bedtime  chlorhexidine 2% Cloths 1 Application(s) Topical <User Schedule>  DULoxetine 90 milliGRAM(s) Oral daily  enoxaparin Injectable 65 milliGRAM(s) SubCutaneous daily  epoetin juan-epbx (RETACRIT) Injectable 01633 Unit(s) IV Push <User Schedule>  isosorbide   mononitrate ER Tablet (IMDUR) 30 milliGRAM(s) Oral daily  NIFEdipine XL 90 milliGRAM(s) Oral daily  NIFEdipine XL 60 milliGRAM(s) Oral at bedtime  pantoprazole    Tablet 40 milliGRAM(s) Oral two times a day  tamsulosin 0.4 milliGRAM(s) Oral at bedtime  torsemide 20 milliGRAM(s) Oral <User Schedule>    PRN Inpatient Medications  acetaminophen   Tablet .. 650 milliGRAM(s) Oral every 6 hours PRN  guaifenesin/dextromethorphan Oral Liquid 10 milliLiter(s) Oral every 6 hours PRN      REVIEW OF SYSTEMS  --------------------------------------------------------------------------------  Gen: No weight changes, fatigue, fevers/chills, weakness  Skin: No rashes  Head/Eyes/Ears/Mouth: No headache; Normal hearing; Normal vision w/o blurriness; No sinus pain/discomfort, sore throat  Respiratory: No dyspnea, cough, wheezing, hemoptysis  CV: No chest pain, PND, orthopnea  GI: No abdominal pain, diarrhea, constipation, nausea, vomiting, melena, hematochezia  : No increased frequency, dysuria, hematuria, nocturia  MSK: No joint pain/swelling; no back pain; no edema  Neuro: No dizziness/lightheadedness, weakness, seizures, numbness, tingling  Heme: No easy bruising or bleeding  Endo: No heat/cold intolerance  Psych: No significant nervousness, anxiety, stress, depression    All other systems were reviewed and are negative, except as noted.    VITALS/PHYSICAL EXAM  --------------------------------------------------------------------------------  T(C): 36.7 (06-20-21 @ 05:15), Max: 36.8 (06-19-21 @ 21:28)  HR: 70 (06-20-21 @ 08:43) (69 - 79)  BP: 158/66 (06-20-21 @ 08:43) (150/60 - 165/60)  RR: 16 (06-20-21 @ 05:15) (16 - 16)  SpO2: 96% (06-20-21 @ 05:15) (96% - 97%)  Wt(kg): --        06-19-21 @ 07:01  -  06-20-21 @ 07:00  --------------------------------------------------------  IN: 480 mL / OUT: 100 mL / NET: 380 mL      Physical Exam:  General: NAD   Lungs: CTAB  Heart: irregular rate  Abdomen: NT, ND, soft  Neuro: alert and oriented to person, place and time;   Skin: intact    LABS/STUDIES  --------------------------------------------------------------------------------              8.9    7.61  >-----------<  261      [06-20-21 @ 06:45]              30.4     139  |  101  |  48.3  ----------------------------<  121      [06-20-21 @ 06:45]  5.1   |  28.0  |  3.32        Ca     9.3     [06-20-21 @ 06:45]      Phos  3.0     [06-18-21 @ 12:23]    TPro  x   /  Alb  2.8  /  TBili  x   /  DBili  x   /  AST  x   /  ALT  x   /  AlkPhos  x   [06-18-21 @ 12:23]          Creatinine Trend:  SCr 3.32 [06-20 @ 06:45]  SCr 2.64 [06-19 @ 06:26]  SCr 4.16 [06-18 @ 12:23]  SCr 4.14 [06-14 @ 05:56]  SCr 4.86 [06-11 @ 06:07]    Urinalysis - [06-04-21 @ 07:54]      Color Yellow / Appearance Clear / SG 1.010 / pH 8.0      Gluc Negative / Ketone Negative  / Bili Negative / Urobili Negative       Blood Large / Protein 100 / Leuk Est Negative / Nitrite Negative      RBC 3-5 / WBC 3-5 / Hyaline  / Gran  / Sq Epi  / Non Sq Epi Negative / Bacteria Negative      Iron 53, TIBC 266, %sat 20      [08-19-20 @ 06:32]  Ferritin 184      [08-19-20 @ 06:32]  PTH -- (Ca 9.6)      [08-19-20 @ 16:08]   91  Vitamin D (25OH) 26.4      [08-19-20 @ 16:08]  HbA1c 4.9      [08-09-18 @ 05:54]  TSH 2.16      [09-15-20 @ 02:01]  Lipid: chol 126, , HDL 60, LDL --      [06-03-21 @ 06:07]    HBsAb Nonreact      [06-17-21 @ 11:47]  HBsAg Nonreact      [06-17-21 @ 11:47]  HCV 0.13, Nonreact      [06-17-21 @ 11:47]

## 2021-06-20 NOTE — PROGRESS NOTE ADULT - SUBJECTIVE AND OBJECTIVE BOX
CC: Lower extremity weakness and elevated trop level (11 Jun 2021 17:12)    INTERVAL HPI/OVERNIGHT EVENTS: awake , alert x 3 , daughter and son in law at bedside.  in no acute distress.     Vital Signs Last 24 Hrs  T(C): 36.7 (20 Jun 2021 05:15), Max: 36.8 (19 Jun 2021 21:28)  T(F): 98 (20 Jun 2021 05:15), Max: 98.2 (19 Jun 2021 21:28)  HR: 75 (20 Jun 2021 11:41) (69 - 79)  BP: 148/65 (20 Jun 2021 11:41) (148/65 - 165/60)  BP(mean): --  RR: 16 (20 Jun 2021 05:15) (16 - 16)  SpO2: 96% (20 Jun 2021 05:15) (96% - 97%)      EXAM:  General: NAD, calm, cooperative  Lungs: CTAB, chest tube removed  Heart: irregular rate  Abdomen: NT, ND, soft  Neuro: alert and oriented to person, place and time; RLE 4/5 compared to LLE which is 3/5 strength; no slurred speech noted; follows commands                            9.0    8.47  )-----------( 275      ( 19 Jun 2021 06:27 )             30.4   06-19    140  |  102  |  31.0<H>  ----------------------------<  84  4.6   |  26.0  |  2.64<H>    Ca    9.0      19 Jun 2021 06:26  Phos  3.0     06-18    TPro  x   /  Alb  2.8<L>  /  TBili  x   /  DBili  x   /  AST  x   /  ALT  x   /  AlkPhos  x   06-18      < from: Xray Chest 1 View- PORTABLE-Routine (Xray Chest 1 View- PORTABLE-Routine in AM.) (06.17.21 @ 16:04) >  IMPRESSION:  Small left pleural effusion

## 2021-06-20 NOTE — PROGRESS NOTE ADULT - NUTRITIONAL ASSESSMENT
This patient has been assessed with a concern for Malnutrition and has been determined to have a diagnosis/diagnoses of Moderate protein-calorie malnutrition.    This patient is being managed with:   Diet DASH/TLC-  Sodium & Cholesterol Restricted  For patients receiving Renal Replacement - No Protein Restr No Conc K No Conc Phos Low  Sodium (RENAL)   Tube Feed Start Time: 21:00  Supplement Feeding Modality:  Oral  Nepro Cans or Servings Per Day:  1       Frequency:  Two Times a day  Entered: Rolly 10 2021  2:10PM    
This patient has been assessed with a concern for Malnutrition and has been determined to have a diagnosis/diagnoses of Moderate protein-calorie malnutrition.    This patient is being managed with:   Diet DASH/TLC-  Sodium & Cholesterol Restricted  For patients receiving Renal Replacement - No Protein Restr No Conc K No Conc Phos Low  Sodium (RENAL)   Tube Feed Start Time: 21:00  Supplement Feeding Modality:  Oral  Nepro Cans or Servings Per Day:  1       Frequency:  Two Times a day  Entered: Rolly 10 2021  2:10PM    
This patient has been assessed with a concern for Malnutrition and has been determined to have a diagnosis/diagnoses of Moderate protein-calorie malnutrition.    This patient is being managed with:   Diet DASH/TLC-  Sodium & Cholesterol Restricted  For patients receiving Renal Replacement - No Protein Restr No Conc K No Conc Phos Low  Sodium (RENAL)  Entered: Jun 2 2021  2:24PM    
This patient has been assessed with a concern for Malnutrition and has been determined to have a diagnosis/diagnoses of Moderate protein-calorie malnutrition.    This patient is being managed with:   Diet DASH/TLC-  Sodium & Cholesterol Restricted  For patients receiving Renal Replacement - No Protein Restr No Conc K No Conc Phos Low  Sodium (RENAL)   Tube Feed Start Time: 21:00  Supplement Feeding Modality:  Oral  Nepro Cans or Servings Per Day:  1       Frequency:  Two Times a day  Entered: Rolly 10 2021  2:10PM    
This patient has been assessed with a concern for Malnutrition and has been determined to have a diagnosis/diagnoses of Moderate protein-calorie malnutrition.    This patient is being managed with:   Diet DASH/TLC-  Sodium & Cholesterol Restricted  For patients receiving Renal Replacement - No Protein Restr No Conc K No Conc Phos Low  Sodium (RENAL)   Tube Feed Start Time: 21:00  Supplement Feeding Modality:  Oral  Nepro Cans or Servings Per Day:  1       Frequency:  Two Times a day  Entered: Rolly 10 2021  2:10PM    
This patient has been assessed with a concern for Malnutrition and has been determined to have a diagnosis/diagnoses of Moderate protein-calorie malnutrition.    This patient is being managed with:   Diet DASH/TLC-  Sodium & Cholesterol Restricted  For patients receiving Renal Replacement - No Protein Restr No Conc K No Conc Phos Low  Sodium (RENAL)  Entered: Jun 2 2021  2:24PM    
This patient has been assessed with a concern for Malnutrition and has been determined to have a diagnosis/diagnoses of Moderate protein-calorie malnutrition.    This patient is being managed with:   Diet DASH/TLC-  Sodium & Cholesterol Restricted  For patients receiving Renal Replacement - No Protein Restr No Conc K No Conc Phos Low  Sodium (RENAL)   Tube Feed Start Time: 21:00  Supplement Feeding Modality:  Oral  Nepro Cans or Servings Per Day:  1       Frequency:  Two Times a day  Entered: Rolly 10 2021  2:10PM    

## 2021-06-21 VITALS
TEMPERATURE: 98 F | SYSTOLIC BLOOD PRESSURE: 152 MMHG | OXYGEN SATURATION: 95 % | DIASTOLIC BLOOD PRESSURE: 71 MMHG | HEART RATE: 72 BPM | RESPIRATION RATE: 18 BRPM

## 2021-06-21 LAB
ANION GAP SERPL CALC-SCNC: 11 MMOL/L — SIGNIFICANT CHANGE UP (ref 5–17)
BUN SERPL-MCNC: 61.9 MG/DL — HIGH (ref 8–20)
CALCIUM SERPL-MCNC: 9.9 MG/DL — SIGNIFICANT CHANGE UP (ref 8.6–10.2)
CHLORIDE SERPL-SCNC: 101 MMOL/L — SIGNIFICANT CHANGE UP (ref 98–107)
CO2 SERPL-SCNC: 28 MMOL/L — SIGNIFICANT CHANGE UP (ref 22–29)
CREAT SERPL-MCNC: 4.07 MG/DL — HIGH (ref 0.5–1.3)
GLUCOSE SERPL-MCNC: 102 MG/DL — HIGH (ref 70–99)
HCT VFR BLD CALC: 32.3 % — LOW (ref 39–50)
HGB BLD-MCNC: 9.3 G/DL — LOW (ref 13–17)
MAGNESIUM SERPL-MCNC: 2.2 MG/DL — SIGNIFICANT CHANGE UP (ref 1.6–2.6)
MCHC RBC-ENTMCNC: 28.8 GM/DL — LOW (ref 32–36)
MCHC RBC-ENTMCNC: 29 PG — SIGNIFICANT CHANGE UP (ref 27–34)
MCV RBC AUTO: 100.6 FL — HIGH (ref 80–100)
PLATELET # BLD AUTO: 285 K/UL — SIGNIFICANT CHANGE UP (ref 150–400)
POTASSIUM SERPL-MCNC: 6.3 MMOL/L — CRITICAL HIGH (ref 3.5–5.3)
POTASSIUM SERPL-SCNC: 6.3 MMOL/L — CRITICAL HIGH (ref 3.5–5.3)
RBC # BLD: 3.21 M/UL — LOW (ref 4.2–5.8)
RBC # FLD: 17.9 % — HIGH (ref 10.3–14.5)
SODIUM SERPL-SCNC: 140 MMOL/L — SIGNIFICANT CHANGE UP (ref 135–145)
WBC # BLD: 10.08 K/UL — SIGNIFICANT CHANGE UP (ref 3.8–10.5)
WBC # FLD AUTO: 10.08 K/UL — SIGNIFICANT CHANGE UP (ref 3.8–10.5)

## 2021-06-21 PROCEDURE — 80053 COMPREHEN METABOLIC PANEL: CPT

## 2021-06-21 PROCEDURE — 89051 BODY FLUID CELL COUNT: CPT

## 2021-06-21 PROCEDURE — 86901 BLOOD TYPING SEROLOGIC RH(D): CPT

## 2021-06-21 PROCEDURE — 85027 COMPLETE CBC AUTOMATED: CPT

## 2021-06-21 PROCEDURE — 87075 CULTR BACTERIA EXCEPT BLOOD: CPT

## 2021-06-21 PROCEDURE — 83615 LACTATE (LD) (LDH) ENZYME: CPT

## 2021-06-21 PROCEDURE — 86850 RBC ANTIBODY SCREEN: CPT

## 2021-06-21 PROCEDURE — 97530 THERAPEUTIC ACTIVITIES: CPT

## 2021-06-21 PROCEDURE — U0003: CPT

## 2021-06-21 PROCEDURE — 97110 THERAPEUTIC EXERCISES: CPT

## 2021-06-21 PROCEDURE — 80074 ACUTE HEPATITIS PANEL: CPT

## 2021-06-21 PROCEDURE — 80061 LIPID PANEL: CPT

## 2021-06-21 PROCEDURE — 87641 MR-STAPH DNA AMP PROBE: CPT

## 2021-06-21 PROCEDURE — 80076 HEPATIC FUNCTION PANEL: CPT

## 2021-06-21 PROCEDURE — 36415 COLL VENOUS BLD VENIPUNCTURE: CPT

## 2021-06-21 PROCEDURE — C1729: CPT

## 2021-06-21 PROCEDURE — 87205 SMEAR GRAM STAIN: CPT

## 2021-06-21 PROCEDURE — 90937 HEMODIALYSIS REPEATED EVAL: CPT

## 2021-06-21 PROCEDURE — 71250 CT THORAX DX C-: CPT

## 2021-06-21 PROCEDURE — 72146 MRI CHEST SPINE W/O DYE: CPT

## 2021-06-21 PROCEDURE — 87102 FUNGUS ISOLATION CULTURE: CPT

## 2021-06-21 PROCEDURE — 93005 ELECTROCARDIOGRAM TRACING: CPT

## 2021-06-21 PROCEDURE — 72148 MRI LUMBAR SPINE W/O DYE: CPT

## 2021-06-21 PROCEDURE — 82570 ASSAY OF URINE CREATININE: CPT

## 2021-06-21 PROCEDURE — 99261: CPT

## 2021-06-21 PROCEDURE — 82945 GLUCOSE OTHER FLUID: CPT

## 2021-06-21 PROCEDURE — 88112 CYTOPATH CELL ENHANCE TECH: CPT

## 2021-06-21 PROCEDURE — 99285 EMERGENCY DEPT VISIT HI MDM: CPT | Mod: 25

## 2021-06-21 PROCEDURE — 70551 MRI BRAIN STEM W/O DYE: CPT

## 2021-06-21 PROCEDURE — 83605 ASSAY OF LACTIC ACID: CPT

## 2021-06-21 PROCEDURE — 84540 ASSAY OF URINE/UREA-N: CPT

## 2021-06-21 PROCEDURE — 88305 TISSUE EXAM BY PATHOLOGIST: CPT

## 2021-06-21 PROCEDURE — 71045 X-RAY EXAM CHEST 1 VIEW: CPT

## 2021-06-21 PROCEDURE — 99233 SBSQ HOSP IP/OBS HIGH 50: CPT

## 2021-06-21 PROCEDURE — 82962 GLUCOSE BLOOD TEST: CPT

## 2021-06-21 PROCEDURE — 83986 ASSAY PH BODY FLUID NOS: CPT

## 2021-06-21 PROCEDURE — 97163 PT EVAL HIGH COMPLEX 45 MIN: CPT

## 2021-06-21 PROCEDURE — U0005: CPT

## 2021-06-21 PROCEDURE — 93970 EXTREMITY STUDY: CPT

## 2021-06-21 PROCEDURE — 85730 THROMBOPLASTIN TIME PARTIAL: CPT

## 2021-06-21 PROCEDURE — 83735 ASSAY OF MAGNESIUM: CPT

## 2021-06-21 PROCEDURE — 82042 OTHER SOURCE ALBUMIN QUAN EA: CPT

## 2021-06-21 PROCEDURE — 85610 PROTHROMBIN TIME: CPT

## 2021-06-21 PROCEDURE — 81001 URINALYSIS AUTO W/SCOPE: CPT

## 2021-06-21 PROCEDURE — 87070 CULTURE OTHR SPECIMN AEROBIC: CPT

## 2021-06-21 PROCEDURE — 84157 ASSAY OF PROTEIN OTHER: CPT

## 2021-06-21 PROCEDURE — 86706 HEP B SURFACE ANTIBODY: CPT

## 2021-06-21 PROCEDURE — 84100 ASSAY OF PHOSPHORUS: CPT

## 2021-06-21 PROCEDURE — 85025 COMPLETE CBC W/AUTO DIFF WBC: CPT

## 2021-06-21 PROCEDURE — 86900 BLOOD TYPING SEROLOGIC ABO: CPT

## 2021-06-21 PROCEDURE — 99239 HOSP IP/OBS DSCHRG MGMT >30: CPT

## 2021-06-21 PROCEDURE — 82803 BLOOD GASES ANY COMBINATION: CPT

## 2021-06-21 PROCEDURE — 86769 SARS-COV-2 COVID-19 ANTIBODY: CPT

## 2021-06-21 PROCEDURE — 80048 BASIC METABOLIC PNL TOTAL CA: CPT

## 2021-06-21 PROCEDURE — 84484 ASSAY OF TROPONIN QUANT: CPT

## 2021-06-21 PROCEDURE — 70450 CT HEAD/BRAIN W/O DYE: CPT

## 2021-06-21 PROCEDURE — 87640 STAPH A DNA AMP PROBE: CPT

## 2021-06-21 PROCEDURE — 80069 RENAL FUNCTION PANEL: CPT

## 2021-06-21 RX ORDER — SODIUM ZIRCONIUM CYCLOSILICATE 10 G/10G
10 POWDER, FOR SUSPENSION ORAL
Refills: 0 | Status: DISCONTINUED | OUTPATIENT
Start: 2021-06-21 | End: 2021-06-21

## 2021-06-21 RX ORDER — GUAIFENESIN/DEXTROMETHORPHAN 600MG-30MG
10 TABLET, EXTENDED RELEASE 12 HR ORAL
Qty: 0 | Refills: 0 | DISCHARGE
Start: 2021-06-21

## 2021-06-21 RX ADMIN — CHLORHEXIDINE GLUCONATE 1 APPLICATION(S): 213 SOLUTION TOPICAL at 06:43

## 2021-06-21 RX ADMIN — DULOXETINE HYDROCHLORIDE 90 MILLIGRAM(S): 30 CAPSULE, DELAYED RELEASE ORAL at 14:43

## 2021-06-21 RX ADMIN — ISOSORBIDE MONONITRATE 30 MILLIGRAM(S): 60 TABLET, EXTENDED RELEASE ORAL at 14:44

## 2021-06-21 RX ADMIN — ERYTHROPOIETIN 10000 UNIT(S): 10000 INJECTION, SOLUTION INTRAVENOUS; SUBCUTANEOUS at 11:11

## 2021-06-21 RX ADMIN — Medication 650 MILLIGRAM(S): at 06:59

## 2021-06-21 RX ADMIN — Medication 650 MILLIGRAM(S): at 07:32

## 2021-06-21 RX ADMIN — Medication 90 MILLIGRAM(S): at 14:44

## 2021-06-21 RX ADMIN — PANTOPRAZOLE SODIUM 40 MILLIGRAM(S): 20 TABLET, DELAYED RELEASE ORAL at 06:45

## 2021-06-21 NOTE — PROGRESS NOTE ADULT - NSICDXPILOT_GEN_ALL_CORE
[place]

## 2021-06-21 NOTE — PROGRESS NOTE ADULT - SUBJECTIVE AND OBJECTIVE BOX
Patient seen in HD.  He is very fatigued.  Reports no pain, but is limited in his interactions.   As per RN, has declined and now need a Akshat.     REVIEW OF SYSTEMS  Constitutional - No fever,  +fatigue  Neurological - +loss of strength   Musculoskeletal - No joint pain, No joint swelling, No muscle pain  Psychiatric - +depression, No anxiety    FUNCTIONAL PROGRESS  6/20  Bed Mobility  Bed Mobility Training Rehab Potential: good, to achieve stated therapy goals  Bed Mobility Training Rolling/Turning: minimum assist (75% patient effort);  1 person assist;  verbal cues;  bed rails  Bed Mobility Training Scooting: maximum assist (25% patient effort);  1 person assist;  verbal cues  Bed Mobility Training Sit-to-Supine: moderate assist (50% patient effort);  1 person assist;  verbal cues;  bed rails  Bed Mobility Training Supine-to-Sit: moderate assist (50% patient effort);  1 person assist;  verbal cues;  bed rails  Bed Mobility Training Limitations: decreased ability to use arms for pushing/pulling;  decreased ability to use legs for bridging/pushing;  impaired ability to control trunk for mobility;  impaired balance;  decreased strength    Bed-Chair Transfer Training  Bed-to-Chair Transfer Training Rehab Potential: fair, will monitor progress closely    Sit-Stand Transfer Training  Transfer Training Sit-to-Stand Transfer: dependent (less than 25% patient effort);  unable to perform;  1 person assist;  katelin walker  Transfer Training Stand-to-Sit Transfer: dependent (less than 25% patient effort);  unable to perform;  1 person assist;  verbal cues;  rolling walker      VITALS  T(C): 36.6 (06-21-21 @ 08:49), Max: 36.6 (06-21-21 @ 08:49)  HR: 69 (06-21-21 @ 08:49) (69 - 77)  BP: 125/59 (06-21-21 @ 08:49) (125/59 - 167/72)  RR: 18 (06-21-21 @ 08:49) (18 - 18)  SpO2: 94% (06-21-21 @ 08:49) (92% - 96%)  Wt(kg): --    MEDICATIONS   acetaminophen   Tablet .. 650 milliGRAM(s) every 6 hours PRN  atorvastatin 80 milliGRAM(s) at bedtime  chlorhexidine 2% Cloths 1 Application(s) <User Schedule>  DULoxetine 90 milliGRAM(s) daily  enoxaparin Injectable 65 milliGRAM(s) daily  epoetin juan-epbx (RETACRIT) Injectable 27572 Unit(s) <User Schedule>  guaifenesin/dextromethorphan Oral Liquid 10 milliLiter(s) every 6 hours PRN  isosorbide   mononitrate ER Tablet (IMDUR) 30 milliGRAM(s) daily  NIFEdipine XL 90 milliGRAM(s) daily  NIFEdipine XL 60 milliGRAM(s) at bedtime  pantoprazole    Tablet 40 milliGRAM(s) two times a day  sodium zirconium cyclosilicate 10 Gram(s) <User Schedule>  tamsulosin 0.4 milliGRAM(s) at bedtime  torsemide 20 milliGRAM(s) <User Schedule>      RECENT LABS/IMAGING                          9.3    10.08 )-----------( 285      ( 21 Jun 2021 05:58 )             32.3     06-21    140  |  101  |  61.9<H>  ----------------------------<  102<H>  6.3<HH>   |  28.0  |  4.07<H>    Ca    9.9      21 Jun 2021 05:58  Mg     2.2     06-21                  MRI HEAD 6/3 - 1.  No acute infarct. 2.  Trace bilateral frontal subdural collections suggesting subdural hematomas or subdural hygromas.    BLE DOPPLER 6/3 - New extension of LEFT lower extremity thrombus into the LEFT femoral vein.    MRI Thoracic spine 6/4 1.  No evidence of epidural collection. 2.  There are T1, T2, and STIR hyperintense lesions in the T10 vertebral body and left T3 pedicle. These are nonspecific but could represent atypical hemangiomas. 3.  Large left pleural effusion. Consider CT chest.    MRI Lumbar spine 6/4 - 1.  No evidence of epidural collection. 2.  Lumbar degenerative changes. At L4-L5, there is severe spinal canal narrowing. 3.  T2 and STIR hyperintense lesions are noted in the L3 and L5 vertebral bodies, which are nonspecific but could represent atypical hemangiomas.    CT CHEST 6/6 - Moderate left pleural effusion with complete collapse left lower lobe and partial collapse lingula.    CXR 6/7 - Moderate left pleural effusion with chest tube in place and no pneumothorax. Small right pleural effusion, better characterized on CT.    CXR 6/8 - LEFT chest tube in place. Clearing of LEFT basilar airspace disease and effusion..    CXR 6/9 - Small left pleural effusion, improved    CXR 6/9 - No pleural effusion. Increased perihilar markings may represent infiltrate in the correct clinical context    CXR 6/12 - Left basilar pigtail chest tube is noted. There is an unchanged tiny left apical pneumothorax. Vague patchy airspace opacity seen within both lungs, unchanged. A loop recorder and a micra device overlie the left chest. Patient is status post TAVR. The heart size is within normal limits.    CXR 6/15 - Increased perihilar/retrocardiac markings, recommend correlation for interstitial infiltrate.    CXR 6/17 - Small left pleural effusion  ----------------------------------------------------------------------------------------  PHYSICAL EXAM  Constitutional - NAD, Comfortable  Extremities - Improved swelling  Neurologic Exam -         Motor -                       LEFT    UE - ShAB 4/5, EF 5/5, EE 5/5,  5/5                    RIGHT UE - ShAB 4/5, EF 5/5, EE 5/5,  5/5                    LEFT    LE - HF 3/5, KE 4/5, DF 2/5                     RIGHT LE - HF 3/5, KE 4/5, DF 4/5      Sensory - Intact to LT  Psychiatric - Fatigued   ----------------------------------------------------------------------------------------  ASSESSMENT/PLAN  87yMale with functional deficits related to debility    Left LE DVT extension - Lovenox  CAD - Lipitor  CHF - Demadex  HTN - Imdur, Procardia  ESRD - HD  Pain - Tylenol, Cymbalta  DVT PPX - SCDs,  Rehab - Patient denied P2P. Patient is demonstrating functional decline and LYNNE is planned. Would benefit from ongiong plan of care conversations. Will sign off at this time. Thank you for allowing me to be part of your patient's care. Please reconsult PMR for additional rehab recommendations or dispo needs if functional status changes.     Discussed with rehab clinical care team.

## 2021-06-21 NOTE — PROGRESS NOTE ADULT - PROVIDER SPECIALTY LIST ADULT
Rehab Medicine
Cardiology
Cardiology
Hospitalist
Nephrology
Neurology
Rehab Medicine
Vascular Surgery
Cardiology
Hospitalist
Nephrology
Neurology
Neurosurgery
Rehab Medicine
Rehab Medicine
Thoracic Surgery
Cardiology
Hospitalist
Hospitalist
Nephrology
Neurology
Neurology
Thoracic Surgery
Thoracic Surgery
Hospitalist
Nephrology
Neurology
Hospitalist
Hospitalist
Thoracic Surgery
Neurosurgery
Thoracic Surgery

## 2021-06-21 NOTE — PROGRESS NOTE ADULT - ASSESSMENT
87 y/o M with PMHx of HTN, HLD, CAD, HFpEF, s/p Right CEA for Carotid artery disease, ESRD on HD 2d/week (M/Fri) via LUE fistula, s/p flecainide w/ ILR placed 6/2020, AS s/p TAVR, and PAD (RLE fem-pop bypass October 2020) multiple gastric AVM's s/p cauterized s/p LLE fem-pop bypass, and had the bypass performed on 3/20/21, post procedure developed L femoral DVT and s/p revision of LLE bypass 3/25/21 brought to ED with cc of b/l LE ext weakness with difficulty to ambulate for 2 days prior to admission.   awaiting acute rehab placement which was denied on 6/17. [id number] was denied on 6/18. Currently awaiting RAINER. Chest tube was removed by CT surgery on 6/14.    # B/l LE weakness - Symptoms likely secondary to progressing/extensive DVT (Duplex: New extension of LEFT lower extremity thrombus into the LEFT femoral vein)  - Acute CVA ruled out  - CT head negative for acute pathology  - MRI brain: Trace bilateral frontal subdural collections suggesting subdural hematomas or subdural hygromas.  - Seen in consult by neuro and neurosurgical teams  - MRI C/T/L spine ordered by neurosurgery, no acute actionable findings   - Continue Lipitor 80mg po qhs   - dc  Plavix as per  vascular team due to high bleeding risk    - changed to renal dosed lovenox    #Left large pleural effusion - transudative; first identified on MRI spine and followed up with CT chest which confirmed  - s/p drainage 6/7 , pluerx cath placed on 6/7  - removed on 6/14  - CXR repeated shows only samll left effusion     #cough - overall much improved  - I believe this is from manipulation of the pleural space  - repeat CXR without changes to suggest reaccumulation of fluid or infiltrative process  - will monitor clinically for signs/symptoms of fever/chills or increased WBC that would suggest pneumonia    #Left Lower Extremity DVT  - B/L LE edema, L>R  - Venous duplex b/l LE: New extension of LLE thrombus into L femoral vein  - MRI head with subdural collection suggestive of subdural hematoma or hygroma  - MRI spine with no acute actionable findings  - Vascular surgery consult appreciated: no IVC filter recommended at this time   - renal dose lovenox  - dcd plavix     #Elevated Troponin levels in setting of ESRD  - Asymptomatic   - EKG: NSR, rate 82/min, first degree AV block   - TTE w/o WMA  -  Continue telemetry monitoring   - Cardiology consult appreciated, no further recs/work up     #Generalized weakness, Deconditioned, -  ? Rehabilitation Potential,     #PVD/HLD/HTN  - c/w statin, nifedipine Imdur    #ESRD on HD- M & F,     #Depression  - On duloxetine    #Hx of GI bleed due to gastric AVMs,  - Monitor h/h   - Continue PPI    Start  SZC on Non Dialysis days,     Conclusions. SZC increased serum bicarbonate concentrations  and reduced patient proportions with serum bicarbonate  <22mmol/L, likely due to SZC-binding of gastrointestinal  ammonium. These SZC-induced serum bicarbonate increases  occurred regardless of CKD stage and were sustained during  ongoing maintenance therapy.     DISPO: awaiting rainer , plan of care discussed with patient and daughter

## 2021-06-21 NOTE — PROGRESS NOTE ADULT - REASON FOR ADMISSION
Lower extremity weakness and elevated trop level

## 2021-06-21 NOTE — PROGRESS NOTE ADULT - SUBJECTIVE AND OBJECTIVE BOX
[facility], [place] DIVISION OF KIDNEY DISEASES AND HYPERTENSION -- HEMODIALYSIS NOTE  --------------------------------------------------------------------------------  Chief Complaint: ESRD/Ongoing hemodialysis requirement    24 hour events/subjective: On HD - 2 K + dialysate,    PAST HISTORY  --------------------------------------------------------------------------------  No significant changes to PMH, PSH, FHx, SHx, unless otherwise noted    ALLERGIES & MEDICATIONS  --------------------------------------------------------------------------------  Allergies    Plavix (Hives)  Toprol-XL (Rash)    Intolerances : None,     Standing Inpatient Medications  atorvastatin 80 milliGRAM(s) Oral at bedtime  chlorhexidine 2% Cloths 1 Application(s) Topical <User Schedule>  DULoxetine 90 milliGRAM(s) Oral daily  enoxaparin Injectable 65 milliGRAM(s) SubCutaneous daily  epoetin juan-epbx (RETACRIT) Injectable 49245 Unit(s) IV Push <User Schedule>  isosorbide   mononitrate ER Tablet (IMDUR) 30 milliGRAM(s) Oral daily  NIFEdipine XL 90 milliGRAM(s) Oral daily  NIFEdipine XL 60 milliGRAM(s) Oral at bedtime  pantoprazole    Tablet 40 milliGRAM(s) Oral two times a day  sodium zirconium cyclosilicate 10 Gram(s) Oral <User Schedule>  tamsulosin 0.4 milliGRAM(s) Oral at bedtime  torsemide 20 milliGRAM(s) Oral <User Schedule>    PRN Inpatient Medications  acetaminophen   Tablet .. 650 milliGRAM(s) Oral every 6 hours PRN  guaifenesin/dextromethorphan Oral Liquid 10 milliLiter(s) Oral every 6 hours PRN    REVIEW OF SYSTEMS  --------------------------------------------------------------------------------  Gen: No weight changes, fatigue, fevers/chills, weakness  Skin: No rashes  Head/Eyes/Ears/Mouth: No headache; Normal hearing; Normal vision w/o blurriness; No sinus pain/discomfort, sore throat  Respiratory: No dyspnea, cough, wheezing, hemoptysis  CV: No chest pain, PND, orthopnea  GI: No abdominal pain, diarrhea, constipation, nausea, vomiting, melena, hematochezia  : No increased frequency, dysuria, hematuria, nocturia  MSK: No joint pain/swelling; no back pain; no edema  Neuro: No dizziness/lightheadedness, weakness, seizures, numbness, tingling  Heme: No easy bruising or bleeding  Endo: No heat/cold intolerance  Psych: No significant nervousness, anxiety, stress, depression    All other systems were reviewed and are negative, except as noted.    VITALS/PHYSICAL EXAM  --------------------------------------------------------------------------------  T(C): 36.4 (06-21-21 @ 06:38), Max: 36.5 (06-20-21 @ 15:45)  HR: 70 (06-21-21 @ 06:38) (70 - 77)  BP: 167/72 (06-21-21 @ 06:38) (146/60 - 167/72)  RR: 18 (06-21-21 @ 06:38) (18 - 18)  SpO2: 92% (06-21-21 @ 06:38) (92% - 96%)    06-20-21 @ 07:01  -  06-21-21 @ 07:00  --------------------------------------------------------  IN: 0 mL / OUT: 150 mL / NET: -150 mL    Physical Exam:  	Gen: NAD, Pale, ill-appearing  	HEENT: PERRL, supple neck,   	Pulm: CTA B/L  	CV: RRR, S1S2; no rub  	Back: No spinal or CVA tenderness; no sacral edema  	Abd: +BS, soft, nontender/nondistended  	: No suprapubic tenderness  	UE: Warm, FROM, no clubbing, intact strength; no edema; no asterixis  	LE: Warm, FROM, no clubbing, intact strength; no edema  	Neuro: No focal deficits,  	Psych: Normal affect and mood  	Skin: Warm, without rashes  	Vascular access: AVF,     LABS/STUDIES  --------------------------------------------------------------------------------              9.3    10.08 >-----------<  285      [06-21-21 @ 05:58]              32.3     140  |  101  |  61.9  ----------------------------<  102      [06-21-21 @ 05:58]  6.3   |  28.0  |  4.07        Ca     9.9     [06-21-21 @ 05:58]      Mg     2.2     [06-21-21 @ 05:58]    Iron 53, TIBC 266, %sat 20      [08-19-20 @ 06:32]  Ferritin 184      [08-19-20 @ 06:32]  PTH -- (Ca 9.6)      [08-19-20 @ 16:08]   91  Vitamin D (25OH) 26.4      [08-19-20 @ 16:08]  HbA1c 4.9      [08-09-18 @ 05:54]  TSH 2.16      [09-15-20 @ 02:01]  Lipid: chol 126, , HDL 60, LDL --      [06-03-21 @ 06:07]    HBsAb Nonreact      [06-17-21 @ 11:47]  HBsAg Nonreact      [06-17-21 @ 11:47]  HCV 0.13, Nonreact      [06-17-21 @ 11:47]

## 2021-06-21 NOTE — PROVIDER CONTACT NOTE (CRITICAL VALUE NOTIFICATION) - SITUATION
pt with heparin infusing at 12 cc's per hour; phlebotomy brianna aptt from same arm as heparin running without it being paused.
Patient scheduled for hemodialysis today in am.

## 2021-06-28 ENCOUNTER — NON-APPOINTMENT (OUTPATIENT)
Age: 86
End: 2021-06-28

## 2021-06-28 ENCOUNTER — APPOINTMENT (OUTPATIENT)
Dept: ELECTROPHYSIOLOGY | Facility: CLINIC | Age: 86
End: 2021-06-28
Payer: MEDICARE

## 2021-06-28 PROCEDURE — 93298 REM INTERROG DEV EVAL SCRMS: CPT

## 2021-06-28 PROCEDURE — G2066: CPT

## 2021-07-07 LAB
CULTURE RESULTS: SIGNIFICANT CHANGE UP
SPECIMEN SOURCE: SIGNIFICANT CHANGE UP

## 2021-07-12 ENCOUNTER — APPOINTMENT (OUTPATIENT)
Dept: VASCULAR SURGERY | Facility: CLINIC | Age: 86
End: 2021-07-12

## 2021-07-13 NOTE — H&P ADULT - NSHPPHYSICALEXAM_GEN_ALL_CORE
PHYSICAL EXAM:  General: in no acute distress  Eyes: PERRLA, EOMI; conjunctiva and sclera clear  Head: Normocephalic; atraumatic  ENMT: No nasal discharge; airway clear  Neck: Supple; non tender; no masses  Respiratory: No wheezes, rales or rhonchi  Cardiovascular: Regular rate and rhythm. S1 and S2 Normal; No murmurs, gallops or rubs  Gastrointestinal: Soft non-tender non-distended; Normal bowel sounds  Genitourinary: No costovertebral angle tenderness  Extremities: Normal range of motion, No clubbing, cyanosis; bilateral pitting edema up to the level of the knees  Vascular: Poorly palpable DP pulses bilaterally  Neurological: Alert and oriented x4  Skin: Warm and dry. No acute rash  Musculoskeletal: Normal gait, tone, without deformities

## 2021-07-13 NOTE — ED ADULT NURSE NOTE - NSIMPLEMENTINTERV_GEN_ALL_ED
Implemented All Fall with Harm Risk Interventions:  [place] to call system. Call bell, personal items and telephone within reach. Instruct patient to call for assistance. Room bathroom lighting operational. Non-slip footwear when patient is off stretcher. Physically safe environment: no spills, clutter or unnecessary equipment. Stretcher in lowest position, wheels locked, appropriate side rails in place. Provide visual cue, wrist band, yellow gown, etc. Monitor gait and stability. Monitor for mental status changes and reorient to person, place, and time. Review medications for side effects contributing to fall risk. Reinforce activity limits and safety measures with patient and family. Provide visual clues: red socks.

## 2021-07-13 NOTE — ED ADULT NURSE NOTE - OBJECTIVE STATEMENT
patient sent from [facility] after getting blood drawn this AM and finding that he had a hgb of 6.0. patient reports generalized weakness but otherwise denies any complaints, denies chest pain, SOB, diff. breathing, n/v/d. placed on cardiac monitor/ upon arrival.

## 2021-07-13 NOTE — ED ADULT NURSE NOTE - CHIEF COMPLAINT QUOTE
Patient BIBA to ED from [facility] where he is currently residing there for LYNNE.  Patient had recent blood drawn resulted today and Hg is 6.

## 2021-07-13 NOTE — ED PROVIDER NOTE - PROGRESS NOTE DETAILS
d/w renal Dr. Mcnulty, who recommends aranas today, and will admit, HD today and transfuse during HD  -md rafa

## 2021-07-13 NOTE — H&P ADULT - ASSESSMENT
87 y/o M with PMHx of HTN, HLD, CAD, HFpEF, s/p Right CEA for Carotid artery disease, ESRD on HD 2d/week (M/Fri) via LUE fistula, a-fib s/p flecainide w/ ILR placed 6/2020, AS s/p TAVR, and PAD (RLE fem-pop bypass October 2020) multiple gastric AVM's s/p cauterized s/p LLE fem-pop bypass, and had the bypass performed on 3/20/21, post procedure developed L femoral DVT and s/p revision of LLE bypass 3/25/21 who presents today to [facility] from rehab after having abnormally low hgb on routine blood work. In the ED patient found to have hemoglobin of 7. Dr. Mcnulty from nephrology want's patient dialyzed tomorrow and to receive 2 U of PRBC during dialysis.    #anemia - may be secondary to renal disease however also on lovenox BID  - admit to monitored bed  - blood ordered for tomorrow; type and screen obtained  - will check CT abdomen to rule out RP bleed - though patient largely asymptomatic  - for now will hold lovenox  - will check doppler to see if DVT still present    #ESRD on HD  - for HD tomorrow  - also on torsemide on days he is not on dialysis  - will contineu daily for now given edema and receiving blood tomorrow    #CAD/CEA/HFpEF/PAD  - not on aspirin  - on full AC  - continue atorvastatin    #hx of GI bleed  - continue protonix daily    #DVT ppx  - SCD daily

## 2021-07-13 NOTE — ED PROVIDER NOTE - OBJECTIVE STATEMENT
88 yo male with hx htn, dm, esrd sent from [facility] for anemia.   patient c/o generalized weakness  denies fever or chills  denies chest pain or sob  patient reports one episode of BRBPR x 3 days ago  patient HD on mon/sat

## 2021-07-13 NOTE — H&P ADULT - HISTORY OF PRESENT ILLNESS
87 y/o M with PMHx of HTN, HLD, CAD, HFpEF, s/p Right CEA for Carotid artery disease, ESRD on HD 2d/week (M/Fri) via LUE fistula, s/p flecainide w/ ILR placed 6/2020, AS s/p TAVR, and PAD (RLE fem-pop bypass October 2020) multiple gastric AVM's s/p cauterized s/p LLE fem-pop bypass, and had the bypass performed on 3/20/21, post procedure developed L femoral DVT and s/p revision of LLE bypass 3/25/21 brought to ED with cc of b/l LE ext weakness with difficulty to ambulate for 2 days prior to admission.   awaiting acute rehab placement which was denied on 6/17. [id number] was denied on 6/18. Currently awaiting LYNNE. Chest tube was removed by CT surgery on 6/14 87 y/o M with PMHx of HTN, HLD, CAD, HFpEF, s/p Right CEA for Carotid artery disease, ESRD on HD 2d/week (M/Fri) via LUE fistula, s/p flecainide w/ ILR placed 6/2020, AS s/p TAVR, and PAD (RLE fem-pop bypass October 2020) multiple gastric AVM's s/p cauterized s/p LLE fem-pop bypass, and had the bypass performed on 3/20/21, post procedure developed L femoral DVT and s/p revision of LLE bypass 3/25/21 who presents today to [facility] from rehab after having abnormally low hgb on routine blood work. In the ED patient found to have hemoglobin of 7. Dr. Mcnulty from nephrology want's patient dialyzed tomorrow and to receive 2 U of PRBC during dialysis.    In the ED patient denies any complaints, denies chest pain, SOB, cough, fever, chills. Denies lightheadedness, orthopnea. Notable edema on exam.

## 2021-07-13 NOTE — ED ADULT TRIAGE NOTE - CHIEF COMPLAINT QUOTE
Patient BIBA to ED from [facility] where he is currently residing there for LYNNE.  Patient had recent blood drawn resulted today and Hg is 6.

## 2021-07-13 NOTE — H&P ADULT - NSHPOUTPATIENTPROVIDERS_GEN_ALL_CORE
Dr. Johnston (nephrology) Dr. Johnston (nephrology)  Dr. Mason (vascular surgery)  Dr. Contreras (neurosurgery)

## 2021-07-14 NOTE — PROGRESS NOTE ADULT - SUBJECTIVE AND OBJECTIVE BOX
Patient is a 87y old  Male who presents with a chief complaint of Anemia (14 Jul 2021 12:25)      HPI:  87 y/o M with PMHx of HTN, HLD, CAD, HFpEF, s/p Right CEA for Carotid artery disease, ESRD on HD 2d/week (M/Fri) via LUE fistula, s/p flecainide w/ ILR placed 6/2020, AS s/p TAVR, and PAD (RLE fem-pop bypass October 2020) multiple gastric AVM's s/p cauterized s/p LLE fem-pop bypass, and had the bypass performed on 3/20/21, post procedure developed L femoral DVT and s/p revision of LLE bypass 3/25/21 who presents today to [facility] from rehab after having abnormally low hgb on routine blood work. In the ED patient found to have hemoglobin of 7. Dr. Mcnulty from nephrology want's patient dialyzed tomorrow and to receive 2 U of PRBC during dialysis.    In the ED patient denies any complaints, denies chest pain, SOB, cough, fever, chills. Denies lightheadedness, orthopnea. Notable edema on exam. (13 Jul 2021 17:05)    FAMILY HISTORY:  Family history of cancer (Grandparent)    Family history of lung cancer (Grandparent)    Family history of premature CAD    FH: type 2 diabetes        INTERVAL HPI/OVERNIGHT EVENTS:    PAST MEDICAL & SURGICAL HISTORY:  DM (diabetes mellitus)  - resolved    AV block, 1st degree    Arrhythmia    CAD (coronary artery disease)    HTN (hypertension)    VT (ventricular tachycardia)    RICHARDS (dyspnea on exertion)    Anemia    Risk factors for obstructive sleep apnea    ESRD on dialysis  HD on Mondays and Fridays    Aortic stenosis  - s/p valve replacement    GI bleeding  due to ulcerated polyps and colonic AVMs    H/O circumcision  at  age  65    H/O left knee surgery    H/O angioplasty  2013,  no  intervention    A-V fistula  left arm 5/2017    H/O carotid endarterectomy  Right    S/P TAVR (transcatheter aortic valve replacement)    History of loop recorder        Allergies    Plavix (Hives)  Toprol-XL (Rash)    Intolerances        Vital Signs Last 24 Hrs  T(C): 36.7 (14 Jul 2021 11:50), Max: 36.9 (14 Jul 2021 04:44)  T(F): 98 (14 Jul 2021 11:50), Max: 98.5 (14 Jul 2021 04:44)  HR: 70 (14 Jul 2021 11:50) (67 - 76)  BP: 148/62 (14 Jul 2021 11:50) (123/54 - 178/65)  BP(mean): --  RR: 18 (14 Jul 2021 11:50) (18 - 20)  SpO2: 100% (14 Jul 2021 11:50) (97% - 100%)                                6.7    7.91  )-----------( 211      ( 14 Jul 2021 09:39 )             22.9     LIVER FUNCTIONS - ( 14 Jul 2021 09:02 )  Alb: 2.9 g/dL / Pro: 5.9 g/dL / ALK PHOS: 137 U/L / ALT: 42 U/L / AST: 24 U/L / GGT: x           PT/INR - ( 13 Jul 2021 16:07 )   PT: 13.9 sec;   INR: 1.21 ratio         PTT - ( 13 Jul 2021 16:07 )  PTT:41.5 sec      RADIOLOGY & ADDITIONAL STUDIES:    MEDICATIONS:  atorvastatin 80 milliGRAM(s) Oral at bedtime  chlorhexidine 2% Cloths 1 Application(s) Topical <User Schedule>  darbepoetin juan SubCutaneous Injection - Peds 100 MICROGram(s) SubCutaneous Once  DULoxetine 90 milliGRAM(s) Oral daily  isosorbide   mononitrate ER Tablet (IMDUR) 30 milliGRAM(s) Oral daily  NIFEdipine XL 90 milliGRAM(s) Oral daily  pantoprazole    Tablet 40 milliGRAM(s) Oral before breakfast  tamsulosin 0.4 milliGRAM(s) Oral at bedtime  torsemide 20 milliGRAM(s) Oral daily     Patient is a 87y old  Male who presents with a chief complaint of Anemia (14 Jul 2021 12:25)    HPI:  85 y/o M with PMHx of HTN, HLD, CAD, HFpEF, s/p Right CEA for Carotid artery disease, ESRD on HD 2d/week (M/Fri) via LUE fistula, s/p flecainide w/ ILR placed 6/2020, AS s/p TAVR, and PAD (RLE fem-pop bypass October 2020) multiple gastric AVM's s/p cauterized s/p LLE fem-pop bypass, and had the bypass performed on 3/20/21, post procedure developed L femoral DVT and s/p revision of LLE bypass 3/25/21 who presents today to [facility] from rehab after having abnormally low hgb on routine blood work. In the ED patient found to have hemoglobin of 7. Dr. Mcnulty from nephrology want's patient dialyzed tomorrow and to receive 2 U of PRBC during dialysis.  In the ED patient denies any complaints, denies chest pain, SOB, cough, fever, chills. Denies lightheadedness, orthopnea. Notable edema on exam. (13 Jul 2021 17:05)    FAMILY HISTORY:  Family history of cancer (Grandparent)  Family history of lung cancer (Grandparent)  Family history of premature CAD  FH: type 2 diabetes    INTERVAL HPI/OVERNIGHT EVENTS:  Pt. is seen and examined. Case d/w attending. Pt. feels weak, getting HD today. still makes a little urine. Not talkative  had 2U PRBCs   HgA1C       PAST MEDICAL & SURGICAL HISTORY:  DM (diabetes mellitus)  - resolved    AV block, 1st degree    Arrhythmia    CAD (coronary artery disease)    HTN (hypertension)    VT (ventricular tachycardia)    RICHARDS (dyspnea on exertion)    Anemia    Risk factors for obstructive sleep apnea    ESRD on dialysis  HD on Mondays and Fridays    Aortic stenosis  - s/p valve replacement    GI bleeding  due to ulcerated polyps and colonic AVMs    H/O circumcision  at  age  65    H/O left knee surgery    H/O angioplasty  2013,  no  intervention    A-V fistula  left arm 5/2017    H/O carotid endarterectomy  Right    S/P TAVR (transcatheter aortic valve replacement)    History of loop recorder        Allergies    Plavix (Hives)  Toprol-XL (Rash)    Intolerances        Vital Signs Last 24 Hrs  T(C): 36.7 (14 Jul 2021 11:50), Max: 36.9 (14 Jul 2021 04:44)  T(F): 98 (14 Jul 2021 11:50), Max: 98.5 (14 Jul 2021 04:44)  HR: 70 (14 Jul 2021 11:50) (67 - 76)  BP: 148/62 (14 Jul 2021 11:50) (123/54 - 178/65)  BP(mean): --  RR: 18 (14 Jul 2021 11:50) (18 - 20)  SpO2: 100% (14 Jul 2021 11:50) (97% - 100%)                                6.7    7.91  )-----------( 211      ( 14 Jul 2021 09:39 )             22.9     LIVER FUNCTIONS - ( 14 Jul 2021 09:02 )  Alb: 2.9 g/dL / Pro: 5.9 g/dL / ALK PHOS: 137 U/L / ALT: 42 U/L / AST: 24 U/L / GGT: x           PT/INR - ( 13 Jul 2021 16:07 )   PT: 13.9 sec;   INR: 1.21 ratio         PTT - ( 13 Jul 2021 16:07 )  PTT:41.5 sec      RADIOLOGY & ADDITIONAL STUDIES:    MEDICATIONS:  atorvastatin 80 milliGRAM(s) Oral at bedtime  chlorhexidine 2% Cloths 1 Application(s) Topical <User Schedule>  darbepoetin juan SubCutaneous Injection - Peds 100 MICROGram(s) SubCutaneous Once  DULoxetine 90 milliGRAM(s) Oral daily  isosorbide   mononitrate ER Tablet (IMDUR) 30 milliGRAM(s) Oral daily  NIFEdipine XL 90 milliGRAM(s) Oral daily  pantoprazole    Tablet 40 milliGRAM(s) Oral before breakfast  tamsulosin 0.4 milliGRAM(s) Oral at bedtime  torsemide 20 milliGRAM(s) Oral daily     Patient is a 87y old  Male who presents with a chief complaint of Anemia (14 Jul 2021 12:25)    HPI:  85 y/o M with PMHx of HTN, HLD, CAD, HFpEF, s/p Right CEA for Carotid artery disease, ESRD on HD 2d/week (M/Fri) via LUE fistula, s/p flecainide w/ ILR placed 6/2020, AS s/p TAVR, and PAD (RLE fem-pop bypass October 2020) multiple gastric AVM's s/p cauterized s/p LLE fem-pop bypass, and had the bypass performed on 3/20/21, post procedure developed L femoral DVT and s/p revision of LLE bypass 3/25/21 who presents today to [facility] from rehab after having abnormally low hgb on routine blood work. In the ED patient found to have hemoglobin of 7. Dr. Mcnulty from nephrology want's patient dialyzed tomorrow and to receive 2 U of PRBC during dialysis.  In the ED patient denies any complaints, denies chest pain, SOB, cough, fever, chills. Denies lightheadedness, orthopnea. Notable edema on exam. (13 Jul 2021 17:05)    FAMILY HISTORY:  Family history of cancer (Grandparent)  Family history of lung cancer (Grandparent)  Family history of premature CAD  FH: type 2 diabetes    INTERVAL HPI/OVERNIGHT EVENTS:  Pt. is seen and examined. Case d/w attending. Pt. feels weak, getting HD today. still makes a little urine. Not talkative  had 2U PRBCs   HgA1C     PAST MEDICAL & SURGICAL HISTORY:  DM (diabetes mellitus)  - resolved  AV block, 1st degree  Arrhythmia  CAD (coronary artery disease)  HTN (hypertension)  VT (ventricular tachycardia)  RICHARDS (dyspnea on exertion)  Anemia  Risk factors for obstructive sleep apnea  ESRD on dialysis  HD on Mondays and Fridays  Aortic stenosis  - s/p valve replacement  GI bleeding  due to ulcerated polyps and colonic AVMs  H/O circumcision  at  age  65  H/O left knee surgery  H/O angioplasty  2013,  no  intervention  A-V fistula  left arm 5/2017  H/O carotid endarterectomy  Right  S/P TAVR (transcatheter aortic valve replacement)  History of loop recorder    Allergies  Plavix (Hives)  Toprol-XL (Rash)    Vital Signs Last 24 Hrs  T(C): 36.7 (14 Jul 2021 11:50), Max: 36.9 (14 Jul 2021 04:44)  T(F): 98 (14 Jul 2021 11:50), Max: 98.5 (14 Jul 2021 04:44)  HR: 70 (14 Jul 2021 11:50) (67 - 76)  BP: 148/62 (14 Jul 2021 11:50) (123/54 - 178/65)  RR: 18 (14 Jul 2021 11:50) (18 - 20)  SpO2: 100% (14 Jul 2021 11:50) (97% - 100%)                          6.7    7.91  )-----------( 211      ( 14 Jul 2021 09:39 )             22.9     LIVER FUNCTIONS - ( 14 Jul 2021 09:02 )  Alb: 2.9 g/dL / Pro: 5.9 g/dL / ALK PHOS: 137 U/L / ALT: 42 U/L / AST: 24 U/L / GGT: x           PT/INR - ( 13 Jul 2021 16:07 )   PT: 13.9 sec;   INR: 1.21 ratio    PTT - ( 13 Jul 2021 16:07 )  PTT:41.5 sec    RADIOLOGY & ADDITIONAL STUDIES:    MEDICATIONS:  atorvastatin 80 milliGRAM(s) Oral at bedtime  chlorhexidine 2% Cloths 1 Application(s) Topical <User Schedule>  darbepoetin juan SubCutaneous Injection - Peds 100 MICROGram(s) SubCutaneous Once  DULoxetine 90 milliGRAM(s) Oral daily  isosorbide   mononitrate ER Tablet (IMDUR) 30 milliGRAM(s) Oral daily  NIFEdipine XL 90 milliGRAM(s) Oral daily  pantoprazole    Tablet 40 milliGRAM(s) Oral before breakfast  tamsulosin 0.4 milliGRAM(s) Oral at bedtime  torsemide 20 milliGRAM(s) Oral daily

## 2021-07-14 NOTE — PROGRESS NOTE ADULT - ASSESSMENT
85 y/o M with PMHx of HTN, HLD, CAD, HFpEF, s/p Right CEA for Carotid artery disease, ESRD on HD 2d/week (M/Fri) via LUE fistula, a-fib s/p flecainide w/ ILR placed 6/2020, AS s/p TAVR, and PAD (RLE fem-pop bypass October 2020) multiple gastric AVM's s/p cauterized s/p LLE fem-pop bypass, and had the bypass performed on 3/20/21, post procedure developed L femoral DVT and s/p revision of LLE bypass 3/25/21 who presents today to [facility] from rehab after having abnormally low hgb on routine blood work. In the ED patient found to have hemoglobin of 7. Dr. Mcnulty from nephrology want's patient dialyzed tomorrow and to receive 2 U of PRBC during dialysis.    #anemia - may be secondary to renal disease however also on lovenox BID  - 2U PRBCs at HD , type and screen obtained  -  CT abdomen - No evidence of retroperitoneal hemorrhage or other acute pathology in the abdomen or pelvis  Bilateral pleural effusions with atelectasis at the lung bases. Improved aeration at the left lower lobe compared to the prior study. Increase atelectasis at the right lung base  - for now will hold Lovenox as per GI recommendations will need to monitor H/H  - will check doppler to see if DVT still present    #ESRD on HD  -  HD today  - also on torsemide on days he is not on dialysis  - will continue daily for now given edema and received 2U PRBCs today     #CAD/CEA/HFpEF/PAD  - not on aspirin  - off AC - Lovenox on hold  - continue atorvastatin    #hx of GI bleed  - continue protonix daily    #DVT ppx  - SCD daily   87 y/o M with PMHx of HTN, HLD, CAD, HFpEF, s/p Right CEA for Carotid artery disease, ESRD on HD 2d/week (M/Fri) via LUE fistula, a-fib s/p flecainide w/ ILR placed 6/2020, AS s/p TAVR, and PAD (RLE fem-pop bypass October 2020) multiple gastric AVM's s/p cauterized s/p LLE fem-pop bypass, and had the bypass performed on 3/20/21, post procedure developed L femoral DVT and s/p revision of LLE bypass 3/25/21 who presents today to [facility] from rehab after having abnormally low hgb on routine blood work. In the ED patient found to have hemoglobin of 7. Dr. Mcnulty from nephrology want's patient dialyzed tomorrow and to receive 2 U of PRBC during dialysis.    #anemia - may be secondary to renal disease however also on lovenox BID  - 2U PRBCs at HD , type and screen obtained  -  CT abdomen - No evidence of retroperitoneal hemorrhage or other acute pathology in the abdomen or pelvis  Bilateral pleural effusions with atelectasis at the lung bases. Improved aeration at the left lower lobe compared to the prior study. Increase atelectasis at the right lung base  - for now will hold Lovenox as per GI recommendations will need to monitor H/H  - will check doppler to see if DVT still present  - c/w IV Iron    #ESRD on HD  -  HD today  - also on torsemide on days he is not on dialysis  - will continue daily for now given edema and received 2U PRBCs today     # Elevated Accucheck  - HgA1C = 5.0  - monitor spot checks  - keep on renal ADA diet    #CAD/CEA/HFpEF/PAD  - not on aspirin  - off AC - Lovenox on hold  - continue atorvastatin    #hx of GI bleed  - continue protonix daily    #DVT ppx  - SCD daily   Patient is a 86 year old male with PMHx of HTN, HLD, CAD, HFpEF, s/p Right CEA for Carotid artery disease, ESRD on HD 2d/week (M/Fri) via LUE fistula, a-fib s/p flecainide w/ ILR placed 6/2020, AS s/p TAVR, and PAD (RLE fem-pop bypass October 2020) multiple gastric AVM's s/p cauterized s/p LLE fem-pop bypass, and had the bypass performed on 3/20/21, post procedure developed L femoral DVT and s/p revision of LLE bypass 3/25/21 who presents today to [facility] from rehab after having abnormally low hgb on routine blood work. In the ED patient found to have hemoglobin of 7.1. Nephrology consulted and patient was transfused 2 units of PRBCs today.    #Acute on Chronic Anemia - may be secondary to renal disease however also on lovenox BID  - 2U PRBCs at HD , type and screen obtained  -  CT abdomen - No evidence of retroperitoneal hemorrhage or other acute pathology in the abdomen or pelvis  Bilateral pleural effusions with atelectasis at the lung bases. Improved aeration at the left lower lobe compared to the prior study. Increase atelectasis at the right lung base  - for now will hold Lovenox as per GI recommendations will need to monitor H/H  - will check doppler to see if DVT still present  - c/w IV Iron    #ESRD on HD   -  HD today  - also on torsemide on days he is not on dialysis  - will continue daily for now given edema and received 2U PRBCs today     # Elevated Accucheck  - HgA1C = 5.0  - monitor spot checks  - keep on renal ADA diet    #CAD/CEA/HFpEF/PAD  - not on aspirin  - off AC - Lovenox on hold  - continue atorvastatin    #hx of GI bleed  - continue protonix daily    #DVT ppx - SCD daily   Patient is a 86 year old male with PMHx of HTN, HLD, CAD, HFpEF, s/p Right CEA for Carotid artery disease, ESRD on HD 2d/week (M/Fri) via LUE fistula, a-fib s/p flecainide w/ ILR placed 6/2020, AS s/p TAVR, and PAD (RLE fem-pop bypass October 2020) multiple gastric AVM's s/p cauterized s/p LLE fem-pop bypass, and had the bypass performed on 3/20/21, post procedure developed L femoral DVT and s/p revision of LLE bypass 3/25/21 who presents today to [facility] from rehab after having abnormally low hgb on routine blood work. In the ED patient found to have hemoglobin of 7.1. Nephrology consulted and patient was transfused 2 units of PRBCs today.    #Acute on Chronic Anemia - may be secondary to renal disease however also on lovenox BID  - 2U PRBCs at HD , type and screen obtained  -  CT abdomen - No evidence of retroperitoneal hemorrhage or other acute pathology in the abdomen or pelvis  Bilateral pleural effusions with atelectasis at the lung bases. Improved aeration at the left lower lobe compared to the prior study. Increase atelectasis at the right lung base  - for now will hold Lovenox and monitor H/H  - will check doppler to see if DVT still present  - c/w IV Iron  - repeat CBC in AM    #ESRD on HD   - HD today; UF goal 2-2.5 liters as tolerated  - also on torsemide on non dialysis days, will switch to dialy due to hypervolemia  - strict I/Os, daily weights  - avoid nephrotoxic agents and renally dose medications for eGFR < 10  - renal recommendations appreciated    # Elevated Accucheck  - HgA1C = 5.0  - monitor spot checks  - keep on renal ADA diet    #CAD/CEA/HFpEF/PAD  - not on aspirin due to gastric AVMs  - off AC due to severe anemia - Lovenox on hold  - continue atorvastatin  - monitor I/Os, daily weights    #hx of GI bleed  - continue protonix daily    #DVT ppx - SCD daily      Dispo - Repeat CBC and PT evaluation.

## 2021-07-14 NOTE — CHART NOTE - NSCHARTNOTEFT_GEN_A_CORE
Asked to obtain blood transfusion consent  Patient is a 87y old  Male who presents with a chief complaint of anemia, presents to [facility] from rehab after having abnormally low hgb on routine blood work.                        7.1    9.31  )-----------( 252      ( 13 Jul 2021 16:07 )             23.4     Discussed with patient regarding the need for blood transfusion. Risk and benefits discussed. Risks including fever, chills/rigors, high or low blood pressure, respiratory distress (wheezing/hypoxia), urticaria/rash/edema, nausea, pain, bleeding, darkened urine, lower back pain, severe allergic reaction and death was discussed.   Risks, benefits, and alternatives of blood transfusion provided including:    Risks:  •	Infections including: HIV, Hepatitis C and Hepatitis B, and West Nile. Risks are about 1/800,000 of a percent  •	Bacterial Infections  •	Transfusion Associated Circulation Overload   •	Transfusion Associated Lung Injury due to foreign antibodies attacking the patients lung causing possibility of long term intubation.  •	Fever, seizures, and worsening anemia and jaundice including anaphylaxis and death.    Benefits:  Improved blood supply to the major organs of the body including:, brain, heart, kidney and lungs.  Risk of not receiving blood including:  worsening fatigue, shortness of breath, stroke, cardiac arrest    Alternatives including: epogen, IV iron  Also explained that each blood product is triple screened to avoid such risks.    Pt. Verbalizes the understanding and consent obtained. Witnessed by staff HD nurse.  After providing the above, patient has consented to a blood  transfusion. Form signed, witnessed by nurse and placed in chart.

## 2021-07-14 NOTE — CONSULT NOTE ADULT - ASSESSMENT
1) ESRD on HD  2) MBD of renal dx  3) Anemia of renal dx  4) Vol HTN    HD today  PRBC x 2  Will follow

## 2021-07-15 NOTE — CONSULT NOTE ADULT - SUBJECTIVE AND OBJECTIVE BOX
Pt Name: CHRISTIANO ELIZABETH    MRN: [id number]      Patient is an 87y Male admitted to our facility secondary to anemia. The patient is followed by orthopaedics as an outpatient for reported knee swelling and had undergone arthrocentesis in the past. Orthopaedics was consulted to evaluate the knee given the patient is currently admitted. The patient was seen and evaluated at bedside. Patient offers no knee related orthopaedic complaints at this time. Patient denies knee pain. Patient is non-ambulatory at baseline.       REVIEW OF SYSTEMS    General: Alert, responsive, in NAD    Musculoskeletal: SEE HPI.    Neurological: No sensory or motor changes.     ROS is otherwise negative.    PAST MEDICAL & SURGICAL HISTORY:  DM (diabetes mellitus)  - resolved    AV block, 1st degree    Arrhythmia    CAD (coronary artery disease)    HTN (hypertension)    VT (ventricular tachycardia)    RICHARDS (dyspnea on exertion)    Anemia    Risk factors for obstructive sleep apnea    ESRD on dialysis  HD on Mondays and Fridays    Aortic stenosis  - s/p valve replacement    GI bleeding  due to ulcerated polyps and colonic AVMs    H/O circumcision  at  age  65    H/O left knee surgery    H/O angioplasty  2013,  no  intervention    A-V fistula  left arm 5/2017    H/O carotid endarterectomy  Right    S/P TAVR (transcatheter aortic valve replacement)    History of loop recorder        Allergies: Plavix (Hives)  Toprol-XL (Rash)      Medications: atorvastatin 80 milliGRAM(s) Oral at bedtime  chlorhexidine 2% Cloths 1 Application(s) Topical <User Schedule>  darbepoetin juan SubCutaneous Injection - Peds 100 MICROGram(s) SubCutaneous Once  DULoxetine 60 milliGRAM(s) Oral daily  isosorbide   mononitrate ER Tablet (IMDUR) 30 milliGRAM(s) Oral daily  NIFEdipine XL 90 milliGRAM(s) Oral daily  pantoprazole    Tablet 40 milliGRAM(s) Oral before breakfast  tamsulosin 0.4 milliGRAM(s) Oral at bedtime  torsemide 20 milliGRAM(s) Oral daily      FAMILY HISTORY:  Family history of cancer (Grandparent)    Family history of lung cancer (Grandparent)    Family history of premature CAD    FH: type 2 diabetes    : non-contributory    Social History:     Ambulation: non-ambulatory.                           9.6    8.82  )-----------( 218      ( 15 Jul 2021 06:40 )             30.7       07-15    140  |  99  |  39.0<H>  ----------------------------<  102<H>  4.2   |  29.0  |  3.06<H>    Ca    9.4      15 Jul 2021 06:40  Phos  2.2     07-15  Mg     1.9     07-15    TPro  5.9<L>  /  Alb  2.9<L>  /  TBili  0.4  /  DBili  x   /  AST  24  /  ALT  42<H>  /  AlkPhos  137<H>  07-14      Vital Signs Last 24 Hrs  T(C): 36.4 (15 Jul 2021 08:11), Max: 36.8 (14 Jul 2021 17:00)  T(F): 97.6 (15 Jul 2021 08:11), Max: 98.2 (14 Jul 2021 17:00)  HR: 80 (15 Jul 2021 12:48) (70 - 80)  BP: 137/51 (15 Jul 2021 12:48) (137/51 - 173/64)  BP(mean): --  RR: 18 (15 Jul 2021 08:11) (18 - 20)  SpO2: 98% (15 Jul 2021 08:11) (93% - 98%)      PHYSICAL EXAM:      Appearance: Patient is somnolent, but in no acute distress.    Musculoskeletal:         Left Lower Extremity: Knee w/ varus alignment. Skin warm and intact. No erythema, swelling, ecchymosis, or induration. No iris TTP about knee. No significant effusion. SILT. +TA/GSC/EHL/FHL.        Imaging Studies: New XR pending. Previous XR demonstrates DJD w/ collapse of the medial compartment.     A/P: Pt is an 87y Male with L Knee OA.    PLAN:   1. Rest, ice, activity modification PRN.  2. Renally dosed pain control PRN.  3. Pertinent imaging reviewed. Will follow-up new XRs.  4. WBAT.  5. No acute orthopaedic intervention planned at this time. Ortho to sign off at this time.  6. Case d/w Dr. Champagne. 
[facility] DIVISION OF KIDNEY DISEASES AND HYPERTENSION -- INITIAL CONSULT NOTE  --------------------------------------------------------------------------------  HPI:  85 y/o M with PMHx of HTN, HLD, CAD, HFpEF, s/p Right CEA for Carotid artery disease, ESRD on HD 2d/week (M/Fri) via LUE fistula, s/p flecainide w/ ILR placed 6/2020, AS s/p TAVR, and PAD (RLE fem-pop bypass October 2020) multiple gastric AVM's s/p cauterized s/p LLE fem-pop bypass, and had the bypass performed on 3/20/21, post procedure developed L femoral DVT and s/p revision of LLE bypass 3/25/21 who presents today to [facility] from rehab after having abnormally low hgb on routine blood work. In the ED patient found to have hemoglobin of 7. In the ED patient denies any complaints, denies chest pain, SOB, cough, fever, chills. Denies lightheadedness, orthopnea. Notable edema on exam.  Pt on HD; getting PRBC;    PAST HISTORY  --------------------------------------------------------------------------------  PAST MEDICAL & SURGICAL HISTORY:  DM (diabetes mellitus)  - resolved    AV block, 1st degree    Arrhythmia    CAD (coronary artery disease)    HTN (hypertension)    VT (ventricular tachycardia)    RICHARDS (dyspnea on exertion)    Anemia    Risk factors for obstructive sleep apnea    ESRD on dialysis  HD on Mondays and Fridays    Aortic stenosis  - s/p valve replacement    GI bleeding  due to ulcerated polyps and colonic AVMs    H/O circumcision  at  age  65    H/O left knee surgery    H/O angioplasty  2013,  no  intervention    A-V fistula  left arm 5/2017    H/O carotid endarterectomy  Right    S/P TAVR (transcatheter aortic valve replacement)    History of loop recorder      FAMILY HISTORY:  Family history of cancer (Grandparent)    Family history of lung cancer (Grandparent)    Family history of premature CAD    FH: type 2 diabetes      PAST SOCIAL HISTORY:    ALLERGIES & MEDICATIONS  --------------------------------------------------------------------------------  Allergies    Plavix (Hives)  Toprol-XL (Rash)    Intolerances      Standing Inpatient Medications  atorvastatin 80 milliGRAM(s) Oral at bedtime  chlorhexidine 2% Cloths 1 Application(s) Topical <User Schedule>  darbepoetin juan SubCutaneous Injection - Peds 100 MICROGram(s) SubCutaneous Once  DULoxetine 90 milliGRAM(s) Oral daily  isosorbide   mononitrate ER Tablet (IMDUR) 30 milliGRAM(s) Oral daily  NIFEdipine XL 90 milliGRAM(s) Oral daily  pantoprazole    Tablet 40 milliGRAM(s) Oral before breakfast  tamsulosin 0.4 milliGRAM(s) Oral at bedtime  torsemide 20 milliGRAM(s) Oral daily    PRN Inpatient Medications      REVIEW OF SYSTEMS  --------------------------------------------------------------------------------  Gen: No weight changes, fatigue, fevers/chills, weakness  Skin: No rashes  Head/Eyes/Ears/Mouth: No headache; Normal hearing; Normal vision w/o blurriness; No sinus pain/discomfort, sore throat  Respiratory: No dyspnea, cough, wheezing, hemoptysis  CV: No chest pain, PND, orthopnea  GI: No abdominal pain, diarrhea, constipation, nausea, vomiting, melena, hematochezia  : No increased frequency, dysuria, hematuria, nocturia  MSK: No joint pain/swelling; no back pain; no edema  Neuro: No dizziness/lightheadedness, weakness, seizures, numbness, tingling  Heme: No easy bruising or bleeding  Endo: No heat/cold intolerance  Psych: No significant nervousness, anxiety, stress, depression    All other systems were reviewed and are negative, except as noted.    VITALS/PHYSICAL EXAM  --------------------------------------------------------------------------------  T(C): 36.7 (07-14-21 @ 11:50), Max: 36.9 (07-14-21 @ 04:44)  HR: 70 (07-14-21 @ 11:50) (67 - 76)  BP: 148/62 (07-14-21 @ 11:50) (123/54 - 178/65)  RR: 18 (07-14-21 @ 11:50) (18 - 20)  SpO2: 100% (07-14-21 @ 11:50) (97% - 100%)  Wt(kg): --  Height (cm): 165.1 (07-13-21 @ 15:12)  Weight (kg): 72.5 (07-13-21 @ 17:00)  BMI (kg/m2): 26.6 (07-13-21 @ 17:00)  BSA (m2): 1.8 (07-13-21 @ 17:00)      07-14-21 @ 07:01  -  07-14-21 @ 12:26  --------------------------------------------------------  IN: 0 mL / OUT: 2000 mL / NET: -2000 mL      Physical Exam:  	Gen: NAD   	HEENT: PERRL, supple neck, clear oropharynx  	Pulm: CTA B/L  	CV: RRR, S1S2; no rub  	Back: No spinal or CVA tenderness; no sacral edema  	Abd: +BS, soft, nontender/nondistended  	: No suprapubic tenderness  	UE: Warm, FROM, no clubbing, intact strength; no edema; no asterixis  	LE: Warm, FROM, no clubbing, intact strength; no edema  	Neuro: No focal deficits, intact gait  	Psych: Normal affect and mood  	Skin: Warm, without rashes     LABS/STUDIES  --------------------------------------------------------------------------------              6.7    7.91  >-----------<  211      [07-14-21 @ 09:39]              22.9     137  |  98  |  46.8  ----------------------------<  105      [07-14-21 @ 09:02]  3.6   |  28.0  |  3.41        Ca     8.8     [07-14-21 @ 09:02]      Mg     1.9     [07-14-21 @ 09:02]      Phos  2.9     [07-14-21 @ 09:02]    TPro  5.9  /  Alb  2.9  /  TBili  0.4  /  DBili  x   /  AST  24  /  ALT  42  /  AlkPhos  137  [07-14-21 @ 09:02]    PT/INR: PT 13.9 , INR 1.21       [07-13-21 @ 16:07]  PTT: 41.5       [07-13-21 @ 16:07]      Creatinine Trend:  SCr 3.41 [07-14 @ 09:02]  SCr 3.16 [07-13 @ 16:07]  SCr 4.07 [06-21 @ 05:58]  SCr 3.32 [06-20 @ 06:45]  SCr 2.64 [06-19 @ 06:26]    Urinalysis - [06-04-21 @ 07:54]      Color Yellow / Appearance Clear / SG 1.010 / pH 8.0      Gluc Negative / Ketone Negative  / Bili Negative / Urobili Negative       Blood Large / Protein 100 / Leuk Est Negative / Nitrite Negative      RBC 3-5 / WBC 3-5 / Hyaline  / Gran  / Sq Epi  / Non Sq Epi Negative / Bacteria Negative      Iron 43, TIBC 249, %sat 17      [07-13-21 @ 16:07]  Ferritin 498      [07-13-21 @ 16:07]  PTH -- (Ca 9.6)      [08-19-20 @ 16:08]   91  Vitamin D (25OH) 26.4      [08-19-20 @ 16:08]  HbA1c 4.9      [08-09-18 @ 05:54]  TSH 2.16      [09-15-20 @ 02:01]  Lipid: chol 126, , HDL 60, LDL --      [06-03-21 @ 06:07]    HBsAb 78.7      [04-09-21 @ 16:45]  HBsAb Nonreact      [06-17-21 @ 11:47]  HBsAg Nonreact      [06-17-21 @ 11:47]  HBcAb Nonreact      [06-25-20 @ 02:07]  HCV 0.13, Nonreact      [06-17-21 @ 11:47]

## 2021-07-15 NOTE — PHYSICAL THERAPY INITIAL EVALUATION ADULT - ADDITIONAL COMMENTS
As per pt. and daughter Vanessa at bedside, pt. lives in a house with his 81 year old wife with a ramp to enter and no stairs inside. pt. has a RW and W/C. Pt. with family nearby, but everyone works. Daughter reporting currently attempting to get assistance in the home. In April 2021 pt. amb with 1 person assist and [facility]. Pt. has since returned to the hospital and gone to [facility]. In [facility] pt. was performing sSPT vs amb bed to chair with RW and 1-2 person assist.

## 2021-07-15 NOTE — PHYSICAL THERAPY INITIAL EVALUATION ADULT - GENERAL OBSERVATIONS, REHAB EVAL
Pt. greeted resting in bed + Iv lock, monitor, , on RA, agreeable to PT with daughter Vanessa at bedside

## 2021-07-15 NOTE — PROGRESS NOTE ADULT - SUBJECTIVE AND OBJECTIVE BOX
[facility] DIVISION OF KIDNEY DISEASES AND HYPERTENSION -- follow up NOTE  --------------------------------------------------------------------------------  Chief Complaint: ESRD/Ongoing hemodialysis requirement    24 hour events/subjective:  s/p HD yesterday; received 2 U PRBc and IV iron        PAST HISTORY  --------------------------------------------------------------------------------  No significant changes to PMH, PSH, FHx, SHx, unless otherwise noted    ALLERGIES & MEDICATIONS  --------------------------------------------------------------------------------  Allergies    Plavix (Hives)  Toprol-XL (Rash)    Intolerances      Standing Inpatient Medications  atorvastatin 80 milliGRAM(s) Oral at bedtime  chlorhexidine 2% Cloths 1 Application(s) Topical <User Schedule>  darbepoetin juan SubCutaneous Injection - Peds 100 MICROGram(s) SubCutaneous Once  DULoxetine 60 milliGRAM(s) Oral daily  isosorbide   mononitrate ER Tablet (IMDUR) 30 milliGRAM(s) Oral daily  NIFEdipine XL 90 milliGRAM(s) Oral daily  pantoprazole    Tablet 40 milliGRAM(s) Oral before breakfast  tamsulosin 0.4 milliGRAM(s) Oral at bedtime  torsemide 20 milliGRAM(s) Oral daily    PRN Inpatient Medications      REVIEW OF SYSTEMS  --------------------------------------------------------------------------------  Gen: No weight changes, fatigue, fevers/chills, weakness  Skin: No rashes  Head/Eyes/Ears/Mouth: No headache  Respiratory: No dyspnea, cough,  CV: No chest pain, orthopnea  GI: No abdominal pain, diarrhea, constipation, nausea, vomiting,  MSK: No joint pain  Neuro: No dizziness/lightheadedness, weakness  Heme: No bleeding  Psych: No significant nervousness, anxiety, stress, depression    All other systems were reviewed and are negative, except as noted.    VITALS/PHYSICAL EXAM  --------------------------------------------------------------------------------  T(C): 36.4 (07-15-21 @ 08:11), Max: 36.8 (07-14-21 @ 17:00)  HR: 80 (07-15-21 @ 12:48) (70 - 80)  BP: 137/51 (07-15-21 @ 12:48) (137/51 - 173/64)  RR: 18 (07-15-21 @ 08:11) (18 - 20)  SpO2: 98% (07-15-21 @ 08:11) (93% - 98%)  Wt(kg): --  Height (cm): 165.1 (07-13-21 @ 15:12)  Weight (kg): 72.5 (07-13-21 @ 17:00)  BMI (kg/m2): 26.6 (07-13-21 @ 17:00)  BSA (m2): 1.8 (07-13-21 @ 17:00)      07-14-21 @ 07:01  -  07-15-21 @ 07:00  --------------------------------------------------------  IN: 0 mL / OUT: 2000 mL / NET: -2000 mL      Physical Exam:  	Gen: NAD, well-appearing  	HEENT: PERRL, supple neck,  	Pulm: CTA B/L  	CV: RRR, S1S2; no rub  	Abd: +BS, soft, nontender  	UE: Warm, intact strength; no asterixis  	LE: Warm, + edema  	Neuro: No focal deficits  	Psych: Normal affect and mood  	Skin: Warm, without rashes  	Vascular access: BARBRA Owens    LABS/STUDIES  --------------------------------------------------------------------------------              9.6    8.82  >-----------<  218      [07-15-21 @ 06:40]              30.7     140  |  99  |  39.0  ----------------------------<  102      [07-15-21 @ 06:40]  4.2   |  29.0  |  3.06        Ca     9.4     [07-15-21 @ 06:40]      Mg     1.9     [07-15-21 @ 06:40]      Phos  2.2     [07-15-21 @ 06:40]    TPro  5.9  /  Alb  2.9  /  TBili  0.4  /  DBili  x   /  AST  24  /  ALT  42  /  AlkPhos  137  [07-14-21 @ 09:02]    PT/INR: PT 13.9 , INR 1.21       [07-13-21 @ 16:07]  PTT: 41.5       [07-13-21 @ 16:07]      Iron 43, TIBC 249, %sat 17      [07-13-21 @ 16:07]  Ferritin 498      [07-13-21 @ 16:07]  PTH -- (Ca 9.6)      [08-19-20 @ 16:08]   91  Vitamin D (25OH) 26.4      [08-19-20 @ 16:08]  HbA1c 4.9      [08-09-18 @ 05:54]  TSH 2.16      [09-15-20 @ 02:01]  Lipid: chol 126, , HDL 60, LDL --      [06-03-21 @ 06:07]    HBsAb Nonreact      [06-17-21 @ 11:47]  HBsAg Nonreact      [06-17-21 @ 11:47]  HCV 0.13, Nonreact      [06-17-21 @ 11:47]

## 2021-07-15 NOTE — PHYSICAL THERAPY INITIAL EVALUATION ADULT - PASSIVE RANGE OF MOTION EXAMINATION, REHAB EVAL
with exception to left LE: knee flexion and ankle PF contractures. Lacking 20 degrees knee ext/no Passive ROM deficits were identified

## 2021-07-15 NOTE — PHYSICAL THERAPY INITIAL EVALUATION ADULT - IMPAIRMENTS CONTRIBUTING TO GAIT DEVIATIONS, PT EVAL
pt. with left knee flexion and ankle PF contracture/impaired balance/narrow base of support/decreased ROM/decreased sensation/decreased strength

## 2021-07-15 NOTE — PROGRESS NOTE ADULT - ASSESSMENT
1) ESRD on HD  2)anemia of CKD + super imposed iron def  3) Vol/ HTN  4) ckd- MBD    hb stable; s/p 2 U prbc  continue AMY and venofer with HD; Aranesp 100 mcg SC on non HD day / once a week ,  ok to resume Lovenox for DVT ppx  bP stable; continue current regimen  Monitor ca++ and phos  continue HD MF schedule

## 2021-07-15 NOTE — PROGRESS NOTE ADULT - SUBJECTIVE AND OBJECTIVE BOX
Intraarticular injection   PROCEDURE NOTE:  Performed by: Dr Mango Champagne    Indication: painful knee/arthritis     Consent: the risks and benefits of arthrocentesis discussed with patient, including the risk of bleeding, infection, and technical failure. Verbal consent was obtained following the discussion.    Universal Protocol: A time out was performed and the correct patient and site were verified.    The left knee joint was prepped proper sterile fashion.  A 20 gauge needle was used to inject the left knee joint using appropriate anatomic landmarks of 6ml lidocaine and 80mg depomedrol. The site was bandaged and no complications were noted. The patient tolerated the procedure well.

## 2021-07-15 NOTE — PROGRESS NOTE ADULT - ATTENDING COMMENTS
I was present and performed the above procedure
I have seen and examined the patient and agree with the above assessment and plan. No overnight events. Patient seen in HD complaining of weakness. Hb 6.7 and patient transfused 2 units of PRBC and received IV iron. Aranesp given on 7/13. Will repeat post transfusion H/H this evening and again in AM. Repeat duplex with mild improvement in left femoral DVT. Hold Lovenox; if CBC stable renal recommending to resume prophylactic dose of lovenox prior to discharge. Will discuss heparin as an alternative with nephrology. No concerns for active GIB, no hematochezia or melena. CT abdomen negative for RP bleed. Monitor vitals closely. Continue PPI. Decrease cymbalta given patient is on dialysis. Patient with incidental spinal hemangiomas; discussed with Dr. Contreras, incidental and present on previous imaging. Not contributing to patient's difficulty ambulating. No further imaging necessary. Will obtain PT evaluation. Probable discharge back to [facility] in 24 hours. Weaned off supplemental O2 and saturating well on RA after ultrafiltration. B/L effusions on imaging. Rest of the management as detailed above.    Vitals stable  Physical exam  General - elderly male, chronically ill appearing, frail, pale, not in acute distress  HEENT - MMM  CVS - RRR, + S1/S2  Resp - diminished breath sounds  GI - soft, nontender  Ext - ++ LE edema (left > right), heeled fem/pop bypass incision     Plan discussed with patient, Dr. Contreras, daughter Vanessa, Medicine PA, RN, Dr. Mcnulty and Dr. Callaway

## 2021-07-15 NOTE — PHYSICAL THERAPY INITIAL EVALUATION ADULT - PERTINENT HX OF CURRENT PROBLEM, REHAB EVAL
87 y/o M with PMHx of HTN, HLD, CAD, HFpEF, s/p Right CEA for Carotid artery disease, ESRD on HD 2d/week (M/Fri) via LUE fistula, s/p flecainide w/ ILR placed 6/2020, AS s/p TAVR, and PAD (RLE fem-pop bypass October 2020) multiple gastric AVM's s/p cauterized s/p LLE fem-pop bypass, and had the bypass performed on 3/20/21, post procedure developed L femoral DVT and s/p revision of LLE bypass 3/25/21 who presents to [facility] from rehab after having abnormally low hgb

## 2021-07-15 NOTE — PHYSICAL THERAPY INITIAL EVALUATION ADULT - DISCHARGE DISPOSITION, PT EVAL
[facility] vs home with 24/7 assist and home PT if available. Family and pt. would like home. [facility] vs home with assist and home PT if available. Family and pt. would like home.

## 2021-07-15 NOTE — PROGRESS NOTE ADULT - SUBJECTIVE AND OBJECTIVE BOX
CHIEF COMPLAINT/INTERVAL HISTORY:    Patient is a 87y old  Male who presents with a chief complaint of anemia (15 Jul 2021 13:03)    SUBJECTIVE & OBJECTIVE: Pt seen and examined at bedside. No overnight events. Patient OOB to chair. Reports feeling better. Off supplemental O2. Seen by ortho for painful left knee s/p depomedrol injection. HD on 7/16 and then discharge home vs [facility].    ROS: No chest pain, palpitations, SOB, light headedness, dizziness, headache, nausea/vomiting, fevers/chills, abdominal pain.    ICU Vital Signs Last 24 Hrs  T(C): 36.6 (15 Jul 2021 16:02), Max: 36.7 (14 Jul 2021 22:13)  T(F): 97.8 (15 Jul 2021 16:02), Max: 98 (14 Jul 2021 22:13)  HR: 73 (15 Jul 2021 16:02) (70 - 80)  BP: 137/60 (15 Jul 2021 16:02) (137/51 - 163/84)  RR: 16 (15 Jul 2021 16:02) (16 - 20)  SpO2: 95% (15 Jul 2021 16:02) (95% - 98%)    MEDICATIONS  (STANDING):  atorvastatin 80 milliGRAM(s) Oral at bedtime  chlorhexidine 2% Cloths 1 Application(s) Topical <User Schedule>  darbepoetin juan SubCutaneous Injection - Peds 100 MICROGram(s) SubCutaneous Once  DULoxetine 60 milliGRAM(s) Oral daily  heparin   Injectable 5000 Unit(s) SubCutaneous every 12 hours  isosorbide   mononitrate ER Tablet (IMDUR) 30 milliGRAM(s) Oral daily  NIFEdipine XL 90 milliGRAM(s) Oral daily  pantoprazole    Tablet 40 milliGRAM(s) Oral before breakfast  tamsulosin 0.4 milliGRAM(s) Oral at bedtime  torsemide 20 milliGRAM(s) Oral daily    MEDICATIONS  (PRN):      LABS:                        9.6    8.82  )-----------( 218      ( 15 Jul 2021 06:40 )             30.7     07-15    140  |  99  |  39.0<H>  ----------------------------<  102<H>  4.2   |  29.0  |  3.06<H>    Ca    9.4      15 Jul 2021 06:40  Phos  2.2     07-15  Mg     1.9     07-15    TPro  5.9<L>  /  Alb  2.9<L>  /  TBili  0.4  /  DBili  x   /  AST  24  /  ALT  42<H>  /  AlkPhos  137<H>  07-14      Vitals stable  Physical exam  General - elderly male, chronically ill appearing, frail, pale, not in acute distress  HEENT - MMM  CVS - RRR, + S1/S2  Resp - diminished breath sounds  GI - soft, nontender  Ext - ++ LE edema (left > right), heeled fem/pop bypass incision, left knee effusion  Skin- warm, dry

## 2021-07-15 NOTE — PROGRESS NOTE ADULT - ASSESSMENT
Patient is a 86 year old male with PMHx of HTN, HLD, CAD, HFpEF, s/p Right CEA for Carotid artery disease, ESRD on HD 2d/week (M/Fri) via LUE fistula, a-fib s/p flecainide w/ ILR placed 6/2020, AS s/p TAVR, and PAD (RLE fem-pop bypass October 2020) multiple gastric AVM's s/p cauterized s/p LLE fem-pop bypass, and had the bypass performed on 3/20/21, post procedure developed L femoral DVT and s/p revision of LLE bypass 3/25/21 who presents today to [facility] from rehab after having abnormally low hgb on routine blood work. In the ED patient found to have hemoglobin of 7.1. Nephrology consulted and patient was transfused 2 units of PRBCs with stabilization of Hb.     #Acute on Chronic Anemia - may be secondary to renal disease however also on lovenox BID   - Hb stabilized s/p 2 units of PRBC, aranesp and IV iron  - Iron studies reviewed  - Continue EPO on MWF along with IV iron; aranesp to be dosed once a week on non dialysis day  - CT abdomen - No evidence of retroperitoneal hemorrhage or other acute pathology in the abdomen or pelvis  - patient is no longer on DAPT and should not resume therapeutic lovenox given ESRD state  - cleared to resume prophylactic dose of Heparin SC    #ESRD on HD (MF) via left brachiocephalic fistula  - Next HD tentatively scheduled on 7/16  - continue torsemide given hypervolemia with bilateral effusions  - monitor phos closely; off binders. Replete if phos remains low on 7/16.  - strict I/Os, daily weights  - avoid nephrotoxic agents and renally dose medications for eGFR < 10  - renal recommendations appreciated    # History of DM; diet controlled  - HgA1C = 5.0  - monitor spot checks  - keep on renal ADA diet    #CAD/CEA s/p R CEA/PAD s/p fem/pop bypass  - not on aspirin or plavix due to gastric AVMs  - continue atorvastatin  - outpatient follow up with cardio and vascular surgery    #hx of GI bleed  - no further antiplatelets as previously discussed with patient and family  - continue protonix daily  - monitor CBC    #Left knee effusion  -XRAY reviewed  -s/p steroid injection by Dr. Champagne today  -PT - [facility] vs home with 24 assist    #Incidental thoracic and lumbar vertebra hemangioma  -seen on previous imaging and incidental as discussed with Dr. Contreras  -no further imaging indicated per Dr. Contreras    #Acute on Chronic HFpEF  -volume status improved s/p ultrafiltration  -UF goal per renal  -continue torsemide  -CT with CXR with bilateral effusions  -strict I/Os  -TTE in June 2021 with preserved EF    Atrial Fibrillation  -not a candidate for AC    BPH  -continue flomax    HTN  -BP stable  -continue Nifedipine XL  -low sodium diet    HLD  -continue statin     #DVT ppx - SCD daily      Dispo - HD on 7/16 and probable discharge home with 24 assist. Repeat CBC.

## 2021-07-15 NOTE — PHYSICAL THERAPY INITIAL EVALUATION ADULT - MANUAL MUSCLE TESTING RESULTS, REHAB EVAL
grossly 3+/5 throughout with exception to b/l hip flexion 3/5 and left ankle DF limited by PF contracture

## 2021-07-15 NOTE — PHYSICAL THERAPY INITIAL EVALUATION ADULT - ACTIVE RANGE OF MOTION EXAMINATION, REHAB EVAL
with exception to left LE: knee flexion and ankle PF contractures/no Active ROM deficits were identified

## 2021-07-16 NOTE — DISCHARGE NOTE PROVIDER - CARE PROVIDERS DIRECT ADDRESSES
[email or web address],DirectAddress_Unknown

## 2021-07-16 NOTE — PROGRESS NOTE ADULT - SUBJECTIVE AND OBJECTIVE BOX
[facility] DIVISION OF KIDNEY DISEASES AND HYPERTENSION -- FOLLOW UP NOTE  --------------------------------------------------------------------------------  Chief Complaint:  ESRD HD  24 hour events/subjective:  HD today;      PAST HISTORY  --------------------------------------------------------------------------------  No significant changes to PMH, PSH, FHx, SHx, unless otherwise noted    ALLERGIES & MEDICATIONS  --------------------------------------------------------------------------------  Allergies    Plavix (Hives)  Toprol-XL (Rash)    Intolerances      Standing Inpatient Medications  atorvastatin 80 milliGRAM(s) Oral at bedtime  chlorhexidine 2% Cloths 1 Application(s) Topical <User Schedule>  darbepoetin juan SubCutaneous Injection - Peds 100 MICROGram(s) SubCutaneous Once  DULoxetine 60 milliGRAM(s) Oral daily  epoetin juan-epbx (RETACRIT) Injectable 52511 Unit(s) IV Push <User Schedule>  heparin   Injectable 5000 Unit(s) SubCutaneous every 12 hours  isosorbide   mononitrate ER Tablet (IMDUR) 30 milliGRAM(s) Oral daily  NIFEdipine XL 90 milliGRAM(s) Oral daily  pantoprazole    Tablet 40 milliGRAM(s) Oral before breakfast  tamsulosin 0.4 milliGRAM(s) Oral at bedtime  torsemide 20 milliGRAM(s) Oral daily    PRN Inpatient Medications      REVIEW OF SYSTEMS  --------------------------------------------------------------------------------  Gen: No weight changes, fatigue, fevers/chills, weakness  Skin: No rashes  Head/Eyes/Ears/Mouth: No headache; Normal hearing; Normal vision w/o blurriness; No sinus pain/discomfort, sore throat  Respiratory: No dyspnea, cough, wheezing, hemoptysis  CV: No chest pain, PND, orthopnea  GI: No abdominal pain, diarrhea, constipation, nausea, vomiting, melena, hematochezia  : No increased frequency, dysuria, hematuria, nocturia  MSK: No joint pain/swelling; no back pain; no edema  Neuro: No dizziness/lightheadedness, weakness, seizures, numbness, tingling  Heme: No easy bruising or bleeding  Endo: No heat/cold intolerance  Psych: No significant nervousness, anxiety, stress, depression    All other systems were reviewed and are negative, except as noted.    VITALS/PHYSICAL EXAM  --------------------------------------------------------------------------------  T(C): 36.8 (07-16-21 @ 09:17), Max: 36.9 (07-15-21 @ 20:02)  HR: 70 (07-16-21 @ 09:17) (70 - 80)  BP: 139/55 (07-16-21 @ 09:17) (137/51 - 168/82)  RR: 18 (07-16-21 @ 09:17) (16 - 18)  SpO2: 95% (07-16-21 @ 09:17) (95% - 98%)  Wt(kg): --        07-15-21 @ 07:01  -  07-16-21 @ 07:00  --------------------------------------------------------  IN: 300 mL / OUT: 101 mL / NET: 199 mL      Physical Exam:  	Gen: NAD   	HEENT: PERRL, supple neck, clear oropharynx  	Pulm: CTA B/L  	CV: RRR, S1S2; no rub  	Back: No spinal or CVA tenderness; no sacral edema  	Abd: +BS, soft, nontender/nondistended  	: No suprapubic tenderness  	UE: Warm, FROM, no clubbing, intact strength; no edema; no asterixis  	LE: Warm, FROM, no clubbing, intact strength; no edema  	Neuro: No focal deficits, intact gait  	Psych: Normal affect and mood  	Skin: Warm, without rashes  	Vascular access:    LABS/STUDIES  --------------------------------------------------------------------------------              9.3    7.18  >-----------<  200      [07-16-21 @ 06:35]              29.8     136  |  98  |  49.2  ----------------------------<  170      [07-16-21 @ 06:35]  4.3   |  26.0  |  3.78        Ca     9.2     [07-16-21 @ 06:35]      Mg     2.0     [07-16-21 @ 06:35]      Phos  3.7     [07-16-21 @ 06:35]            Creatinine Trend:  SCr 3.78 [07-16 @ 06:35]  SCr 3.06 [07-15 @ 06:40]  SCr 3.41 [07-14 @ 09:02]  SCr 3.16 [07-13 @ 16:07]  SCr 4.07 [06-21 @ 05:58]        Iron 43, TIBC 249, %sat 17      [07-13-21 @ 16:07]  Ferritin 498      [07-13-21 @ 16:07]  PTH -- (Ca 9.6)      [08-19-20 @ 16:08]   91  Vitamin D (25OH) 26.4      [08-19-20 @ 16:08]  HbA1c 4.9      [08-09-18 @ 05:54]  TSH 2.16      [09-15-20 @ 02:01]  Lipid: chol 126, , HDL 60, LDL --      [06-03-21 @ 06:07]    HBsAb Nonreact      [06-17-21 @ 11:47]  HBsAg Nonreact      [06-17-21 @ 11:47]  HCV 0.13, Nonreact      [06-17-21 @ 11:47]

## 2021-07-16 NOTE — DISCHARGE NOTE PROVIDER - NSDCCPCAREPLAN_GEN_ALL_CORE_FT
PRINCIPAL DISCHARGE DIAGNOSIS  Diagnosis: Anemia  Assessment and Plan of Treatment: - may be secondary to renal disease however also on lovenox BID   - Hb stabilized s/p 2 units of PRBC, aranesp and IV iron  -continue ferrous sulfate at home  -follow up with nephro/GI      SECONDARY DISCHARGE DIAGNOSES  Diagnosis: ESRD with anemia  Assessment and Plan of Treatment: -continue torsemide  -avoid neprhotoxins  -HD set up for Monday 7/19  -follow up wiht nephro       Diagnosis: CAD (coronary atherosclerotic disease)  Assessment and Plan of Treatment: s/p CEA s/p fem/pop bypass  - not on aspirin or plavix due to gastric AVMs  - continue atorvastatin  - outpatient follow up with cardio and vascular surgery    Diagnosis: GIB (gastrointestinal bleeding)  Assessment and Plan of Treatment: - no further antiplatelets as previously discussed with patient and family  - continue protonix daily  -follow up with GI    Diagnosis: Knee effusion, left  Assessment and Plan of Treatment: -s/p steroid injection  -continue PT home  -follow up with ortho    Diagnosis: Acute on chronic heart failure with preserved ejection fraction (HFpEF)  Assessment and Plan of Treatment: -continue torsemide  -follow up with cardiology    Diagnosis: Deep vein thrombosis (DVT) of lower extremity, unspecified chronicity, unspecified laterality, unspecified vein  Assessment and Plan of Treatment:      PRINCIPAL DISCHARGE DIAGNOSIS  Diagnosis: Anemia  Assessment and Plan of Treatment: - likely secondary to renal disease; no overt signs or symptoms of GIB  - Hb stabilized s/p 2 units of PRBC, aranesp and IV iron  - You will continue Procrit and IV iron on dialysis days  - follow up with nephro and GI as outpatient  - please follow up with your PCP within 1 week for repeat labs to ensure stable blood counts      SECONDARY DISCHARGE DIAGNOSES  Diagnosis: ESRD with anemia  Assessment and Plan of Treatment: -continue torsemide on non-dialysis days  -HD set up for Monday 7/19  -continue nephrovite  -follow up with nephro within 1 week      Diagnosis: CAD (coronary atherosclerotic disease)  Assessment and Plan of Treatment: s/p CEA s/p fem/pop bypass  - not on aspirin or plavix due to gastric AVMs  - continue atorvastatin  - outpatient follow up with cardio and vascular surgery    Diagnosis: GIB (gastrointestinal bleeding)  Assessment and Plan of Treatment: - no further antiplatelets as previously discussed with patient and family  - continue protonix daily  -follow up with GI    Diagnosis: Knee effusion, left  Assessment and Plan of Treatment: -s/p steroid injection  -continue home PT  -follow up with ortho Dr. Champagne    Diagnosis: Acute on chronic heart failure with preserved ejection fraction (HFpEF)  Assessment and Plan of Treatment: -continue torsemide  -adhere to sodium and fluid restriction  -follow up with cardiology    Diagnosis: Deep vein thrombosis (DVT) of lower extremity, unspecified chronicity, unspecified laterality, unspecified vein  Assessment and Plan of Treatment: please take heparin SC since you are not a candidate for antiplatelets or therapeutic AC

## 2021-07-16 NOTE — DISCHARGE NOTE PROVIDER - PROVIDER TOKENS
PROVIDER:[TOKEN:[[id number]],FOLLOWUP:[2 weeks]],PROVIDER:[TOKEN:[[id number]],FOLLOWUP:[1 week]],PROVIDER:[TOKEN:[[id number]],FOLLOWUP:[1 week]],PROVIDER:[TOKEN:[[id number]],FOLLOWUP:[2 weeks]],FREE:[LAST:[PCP],PHONE:[(   )    -],FAX:[(   )    -],FOLLOWUP:[2 weeks]]

## 2021-07-16 NOTE — ASSESSMENT
Reviewed elevated HgbA1C from recent POC visit and past April visit.    Not checking blood sugars at home.  Reviewed provider recommendations.  Entered diabetic educator consult.  Gave patient scheduling number.  Patient verbalized understanding of plan with no further questions or concerns at this time.   [Carotid Endarterectomy] : carotid endarterectomy [FreeTextEntry1] : 86 y/o male s/p R CEA 10/10/17 and S/P LUE AVF 5/4/18. He continues to feel well, refers no complaints. He will continue with medical management of cholesterol, CKD and anemia.

## 2021-07-16 NOTE — DISCHARGE NOTE PROVIDER - INSTRUCTIONS
Please follow a heart healthy diet low in sodium and cholesterol. Include lean protiens, fruits, vegetables, and whole grains. Fluid restriction 1Liter daily

## 2021-07-16 NOTE — DISCHARGE NOTE PROVIDER - CARE PROVIDER_API CALL
Mango Champagne)  Orthopaedic Surgery  [address], [address]  [place] [address]  Phone: [phone]  Fax: [phone]  Follow Up Time: 2 weeks    Siva Winston)  Surgery; Vascular Surgery  [address] [facility] [place] [address]  Phone: [phone]  Fax: [phone]  Follow Up Time: 1 week    Sergio Johnston)  Nephrology  [address], [place] [address]  Phone: [phone]  Fax: [phone]  Follow Up Time: 1 week    Evangelist Giron)  Gastroenterology; Internal Medicine  [address]  [place] [address]  Phone: [phone]  Fax: [phone]  Follow Up Time: 2 weeks    PCP,   Phone: (   )    -  Fax: (   )    -  Follow Up Time: 2 weeks

## 2021-07-16 NOTE — DISCHARGE NOTE NURSING/CASE MANAGEMENT/SOCIAL WORK - PATIENT PORTAL LINK FT
You can access the FollowMyHealth Patient Portal offered by [facility] by registering at the following website: [email or web address]. By joining [facility]’s FollowMyHealth portal, you will also be able to view your health information using other applications (apps) compatible with our system.

## 2021-07-16 NOTE — DISCHARGE NOTE PROVIDER - NSDCFUADDINST_GEN_ALL_CORE_FT
Feel better and stay healthy. Return to ED for worsening shortness of breath, dark tarry stools, or increase lethargy/weakness.

## 2021-07-16 NOTE — DISCHARGE NOTE PROVIDER - NSDCMRMEDTOKEN_GEN_ALL_CORE_FT
acetaminophen 325 mg oral tablet: 2 tab(s) orally every 6 hours, As needed, Temp greater or equal to 38C (100.4F), Mild Pain (1 - 3)  atorvastatin 80 mg oral tablet: 1 tab(s) orally once a day (at bedtime)  DULoxetine 60 mg oral delayed release capsule: 1 cap(s) orally once a day  guaifenesin-dextromethorphan 100 mg-10 mg/5 mL oral liquid: 10 milliliter(s) orally every 6 hours, As needed, Cough  isosorbide mononitrate 30 mg oral tablet, extended release: 1 tab(s) orally once a day  Nephro-Thomas oral tablet: 1 tab(s) orally once a day  NIFEdipine 90 mg oral tablet, extended release: 1 tab(s) orally once a day  pantoprazole 40 mg oral delayed release tablet: 1 tab(s) orally every 12 hours  tamsulosin 0.4 mg oral capsule: 1 cap(s) orally once a day (at bedtime)  torsemide 20 mg oral tablet: 1 tab(s) orally on non HD days   atorvastatin 80 mg oral tablet: 1 tab(s) orally once a day (at bedtime)  DULoxetine 60 mg oral delayed release capsule: 1 cap(s) orally once a day  isosorbide mononitrate 30 mg oral tablet, extended release: 1 tab(s) orally once a day  Nephro-Thomas oral tablet: 1 tab(s) orally once a day  NIFEdipine 90 mg oral tablet, extended release: 1 tab(s) orally once a day  pantoprazole 40 mg oral delayed release tablet: 1 tab(s) orally every 12 hours  tamsulosin 0.4 mg oral capsule: 1 cap(s) orally once a day (at bedtime)  torsemide 20 mg oral tablet: 1 tab(s) orally on non HD days   atorvastatin 80 mg oral tablet: 1 tab(s) orally once a day (at bedtime)  DULoxetine 60 mg oral delayed release capsule: 1 cap(s) orally once a day  heparin 5000 units/mL injectable solution: 5000 unit(s) subcutaneously 2 times a day   isosorbide mononitrate 30 mg oral tablet, extended release: 1 tab(s) orally once a day  Nephro-Thomas oral tablet: 1 tab(s) orally once a day  NIFEdipine 90 mg oral tablet, extended release: 1 tab(s) orally once a day  pantoprazole 40 mg oral delayed release tablet: 1 tab(s) orally every 12 hours  tamsulosin 0.4 mg oral capsule: 1 cap(s) orally once a day (at bedtime)  torsemide 20 mg oral tablet: 1 tab(s) orally on non HD days

## 2021-07-16 NOTE — DISCHARGE NOTE PROVIDER - HOSPITAL COURSE
Brief Hospital Course: Patient is a 86 year old male with PMHx of HTN, HLD, CAD, HFpEF, s/p Right CEA for Carotid artery disease, ESRD on HD 2d/week (M/Fri) via LUE fistula, a-fib s/p flecainide w/ ILR placed 6/2020, AS s/p TAVR, and PAD (RLE fem-pop bypass October 2020) multiple gastric AVM's s/p cauterized s/p LLE fem-pop bypass, and had the bypass performed on 3/20/21, post procedure developed L femoral DVT and s/p revision of LLE bypass 3/25/21 who presents today to [facility] from rehab after having abnormally low hgb on routine blood work. In the ED patient found to have hemoglobin of 7.1. Nephrology consulted and patient was transfused 2 units of PRBCs with stabilization of Hb. Pt also rec'd IV iron with Hgb 9.3 this am. Pt will receive HD this morning prior to discharge then home with nursing care and PT. HD arranged for monday 7/19    Vital Signs Last 24 Hrs  T(C): 36.8 (16 Jul 2021 09:17), Max: 36.9 (15 Jul 2021 20:02)  T(F): 98.2 (16 Jul 2021 09:17), Max: 98.4 (15 Jul 2021 20:02)  HR: 70 (16 Jul 2021 09:17) (70 - 80)  BP: 139/55 (16 Jul 2021 09:17) (137/51 - 168/82)  RR: 18 (16 Jul 2021 09:17) (16 - 18)  SpO2: 95% (16 Jul 2021 09:17) (95% - 98%)    Physical Exam:   General - elderly male, chronically ill appearing, frail, NAD   HEENT - MMM  CVS - RRR, + S1/S2  Resp - diminished breath sounds  GI - soft, nontender  Ext - ++ LE edema (left > right), heeled fem/pop bypass incision, left knee effusion  Skin- warm, dry    Patient was medically optimized and improved clinically throughout hospital. Patient seen and examined on day of discharge. All electrolyte abnormalities were monitored carefully and repleted as necessary during this hospitalization. At the time of discharge patient was hemodynamically stable and amenable to all terms of discharge. The patient has received verbal instructions from myself regarding discharge plans.     Length of Discharge: 45MIN    Patient is medically stable and cleared for discharge to home with nursing and PT abd outpatient follow up.     Patient is a 86 year old male with PMHx of HTN, HLD, CAD, HFpEF, s/p Right CEA for Carotid artery disease, ESRD on HD 2d/week (M/Fri) via LUE fistula, a-fib s/p flecainide w/ ILR placed 6/2020, AS s/p TAVR, and PAD (RLE fem-pop bypass October 2020) multiple gastric AVM's s/p cauterized s/p LLE fem-pop bypass, and had the bypass performed on 3/20/21, post procedure developed L femoral DVT and s/p revision of LLE bypass 3/25/21 who presents today to [facility] from rehab after having abnormally low hgb on routine blood work. In the ED patient found to have hemoglobin of 7.1. Nephrology consulted and patient was transfused 2 units of PRBCs with stabilization of Hb. Pt also rec'd IV iron, in addition to aranesp and epogen at dialysis. Post transfusion labs have remained stable; Hgb 9.3 this am. Pt will receive HD this morning prior to discharge then home with nursing care and PT. HD arranged for monday 7/19. Outpatient labs. Patient will be discharged on prophylactic dose of heparin given gastric AVMS.    Vital Signs Last 24 Hrs  T(C): 36.8 (16 Jul 2021 09:17), Max: 36.9 (15 Jul 2021 20:02)  T(F): 98.2 (16 Jul 2021 09:17), Max: 98.4 (15 Jul 2021 20:02)  HR: 70 (16 Jul 2021 09:17) (70 - 80)  BP: 139/55 (16 Jul 2021 09:17) (137/51 - 168/82)  RR: 18 (16 Jul 2021 09:17) (16 - 18)  SpO2: 95% (16 Jul 2021 09:17) (95% - 98%)    Physical Exam:   General - elderly male, chronically ill appearing, frail, NAD   HEENT - MMM  CVS - RRR, + S1/S2  Resp - diminished breath sounds  GI - soft, nontender  Ext - ++ LE edema (left > right), heeled fem/pop bypass incision, left knee effusion  Skin- warm, dry    Patient was medically optimized and improved clinically throughout hospital. Patient seen and examined on day of discharge. All electrolyte abnormalities were monitored carefully and repleted as necessary during this hospitalization. At the time of discharge patient was hemodynamically stable and amenable to all terms of discharge. The patient has received verbal instructions from myself regarding discharge plans.     Patient is medically stable and cleared for discharge to home with nursing and PT abd outpatient follow up. Length of Discharge: 45MIN

## 2021-07-19 NOTE — ED ADULT NURSE NOTE - ALCOHOL PRE SCREEN (AUDIT - C)
Called and spoke with patient. Scheduled patient with Dr. Silveira at 1:10pm on 7/22. Patient denies abdominal pain but a marked increase in GERD symptoms and feelings of being full.     Leslie Odonnell RN on 7/19/2021 at 1:16 PM    
I would like to see this patient in person and examine her.  Please put her in my 1:10pm LUIS A slot on 7/22/21.     Thanks!     Cortney Silveira MD   Internal Medicine/Pediatrics   [facility]   
Statement Selected

## 2021-08-11 NOTE — ED ADULT TRIAGE NOTE - CHIEF COMPLAINT QUOTE
pt reports going to answer doorbell fell, hit head, laceration to scalp bleeding controlled. pt denies LOC, last took heparin 2 weeks ago, on ASA daily, MD Taylor at bedside, brought to CT

## 2021-08-11 NOTE — ED PROVIDER NOTE - OBJECTIVE STATEMENT
87yoM; with PMHx of HTN, HLD, CAD, HFpEF, s/p Right CEA for Carotid artery disease, ESRD on HD 2d/week (M/Fri) via LUE fistula, a-fib s/p flecainide w/ ILR placed 6/2020, AS s/p TAVR, and PAD (RLE fem-pop bypass October 2020) multiple gastric AVM's s/p cauterized s/p LLE fem-pop bypass, and had the bypass performed on 3/20/21, post procedure developed L femoral DVT and s/p revision of LLE bypass 3/25/21; now p/w head trauma--states he stood up quickly and tripped.  +head trauma. denies loc. denies n/v. denies numbness/tingling. denies focal weakness. denies cp/sob/palp/lh prior to fall.   PMH: HTN, HLD, CAD, CHF, SARAH, Afib, PAD  SOCIAL: denies smoking / denies illicit substance use

## 2021-08-11 NOTE — ED PROVIDER NOTE - PATIENT PORTAL LINK FT
You can access the FollowMyHealth Patient Portal offered by [facility] by registering at the following website: [email or web address]. By joining [facility]’s FollowMyHealth portal, you will also be able to view your health information using other applications (apps) compatible with our system.

## 2021-08-11 NOTE — ED PROVIDER NOTE - PHYSICAL EXAMINATION
Gen: Alert, NAD  Head: NC, 2mm superficial laceration over right parietal scalp, PERRL, EOMI, normal lids/conjunctiva  ENT: B TM WNL  Neck: +supple, no tenderness/meningismus/JVD, +Trachea midline  Pulm: Bilateral BS, normal resp effort, no wheeze/stridor/retractions  CV: RRR, no M/R/G, +dist pulses  Abd: soft, NT/ND, +BS, no hepatosplenomegaly  Mskel:    L UE: from/nt @shoulder/elbow/wrist/hand   R UE: from/nt @shoulder/elbow/wrist/hand   L LE: from/nt @ hip/knee/ankle   R LE: from/nt @hip/knee/ankle   distal pulses intact  BACK: nt midline c/t/l/s spine.   Neuro: AAOx3, no sensory/motor deficit

## 2021-08-11 NOTE — ED ADULT NURSE NOTE - OBJECTIVE STATEMENT
88y/o male s/p mechanical fall this morning 88y/o male s/p mechanical fall this morning. pt denies any LOC, abrasion noted to top of head. pt aox4, +ROM to all extremities, no other obvious injuries. family at bedside, pt denies any N.V. will continue to monitor

## 2021-08-11 NOTE — ED PROVIDER NOTE - NS ED ROS FT
Constitutional: (-) fever  (-)chills  (-)sweats  Eyes/ENT: (-)   Cardiovascular: (-) chest pain, (-) palpitations (-) edema   Respiratory: (-) cough, (-) shortness of breath   Gastrointestinal: (-)nausea  (-)vomiting, (-) diarrhea  (-) abdominal pain   :  (-)dysuria, (-)frequency, (-)urgency, (-)hematuria  Musculoskeletal: (-) neck pain, (-) back pain, (-) joint pain  Integumentary: (-) rash, (-) edema  Neurological: (-) headache, (-) altered mental status  (-)LOC  +headache

## 2021-08-11 NOTE — ED PROVIDER NOTE - CLINICAL SUMMARY MEDICAL DECISION MAKING FREE TEXT BOX
patient with head trauma, ct normal, labs normal, will dc. offered tetanus but request to wait to check with pmd if uptodate.

## 2021-08-18 NOTE — ED PROVIDER NOTE - CONSTITUTIONAL NEGATIVE STATEMENT, MLM
Have You Had Fillers Before?: has had fillers When Was Your Last Filler Injection?: 8/17/21 no fever and no chills.

## 2021-09-11 NOTE — ED PROVIDER NOTE - CPE EDP NEURO NORM
September 11, 2021    To Whom It May Concern:         This is confirmation that Barbara David attended her scheduled appointment with Facundo Cooper M.D. on 9/11/21.  Patient is being tested for COVID-19 and may not return to work until test results available.  Please excused missed work days this past week.  If test results were to be positive, patient would not be allowed to return to work for 10 days after symptoms started.  Thank you for making appropriate accommodations as they recover.           If you have any questions please do not hesitate to call me at the phone number listed below.    Sincerely,          Facundo Cooper M.D.  [phone]                
normal...

## 2021-09-22 NOTE — PHYSICAL THERAPY INITIAL EVALUATION ADULT - LEVEL OF INDEPENDENCE: GAIT, REHAB EVAL
INFECTIOUS DISEASE PROGRESS NOTE    Sina Apple Patient Status:  Inpatient    1966 MRN [id number]   [facility] 5NW-A Attending Aishwarya Baker MD   Hosp Day # 1 PCP Marylu Barrientos MD     Remdesivir no  Dexa D#2    Subjective: Pt no 09/22/21  0736   RBC 4.88 4.99   HGB 13.9 14.6   HCT 42.1 43.0   MCV 86.3 86.2   MCH 28.5 29.3   MCHC 33.0 34.0   RDW 12.3 12.5   NEPRELIM 5.66 4.36   WBC 7.7 6.1   .0 193.0     Recent Labs   Lab 09/21/21  1435 09/22/21  0736   * 136*   BUN 9 CONCLUSION:  No evidence of active cardiopulmonary disease.     Dictated by (CST): Jeff Gandhi MD on 9/15/2021 at 11:23 PM     Finalized by (CST): Jeff Gandhi MD on 9/15/2021 at 11:24 PM       XR CHEST AP PORTABLE  (CPT=71045)    Result Date: 9/21/2 Will cont to follow    Remedios Navarrete MD 150 feet no device modified I

## 2021-09-29 NOTE — ED ADULT NURSE NOTE - TEMPLATE LIST FOR HEAD TO TOE ASSESSMENT
Spoke with patient and explained that we will check with Dr. Julian to check on an antibiotic prior to procedure.    Spoke with Nohemy, Dr. Julian's nurse.   Dr. Julian is requesting that patient have an IV antibiotic during her colonoscopy because she is having issues with her knee and her surgery was only 4 months ago. Nohemy requests a call back at [phone] with recommendation.    Dr. Daily - Please advise if okay for IV antibiotic during colonoscopy on 10/4/21.     Fluid/Electrolyte/Metabolic

## 2021-10-27 NOTE — CONSULT NOTE ADULT - ASSESSMENT
Assessment and Plan:    87 yo male with the PMH of HTN, HLD, CAD, HFpEF, s/p Right CEA for Carotid artery disease, ESRD on HD 2d/week (M/Fri) via LUE fistula, s/p flecainide w/ ILR placed 6/2020, AS s/p TAVR, PAD with RLE revasc on 10/2020 on Plavix, anemia with thrombosed L CFA to below knee pop bypass s/p  LLE bypass revision w explant of PTFE bypass , CFA to AT bypass w cryovein complicated by acute blood loss anemia and loss of PPM function intra-op.      Neuro:   - sedation / analgesia with fentanyl gtt  - delirium precautions  - daily sedation holidays    CV:  Failed PPM  - Interrogated by cardiology and capture obtained at this time  - Currently paced at 60 bpm  - Currently off vasopressors  - Continue hemodynamic monitoring  - Maintain MAP > 65    Pulm:   - Mech vented on AC  - Transition to PSV and wean to extubation  - Pulmonary toilet  - AM ABG    GI/Nutrition:   - NPO   - continue bowel reg  - continue protonix bid with PMH of GI bleed    /Renal:   - Acidemia resolved  - Monitor lytes: potassium / phos  - HD 3/26   - carrera for strict I&O    ID:  - Pt was receiving Cefepime, Vanco, flagyl for AMS.  Cefepime and Vanco post dialysis.  Restart flagyl  - Monitor fever curve  - Leukocytosis    Endo:  - monitor blood glucose  - continue ISS    Skin:   - repositioning for DTI prevention while in bed    Heme/DVT Prophylaxis:  -  Received 8 PRBC, 4 FFP, 1 plt  - DVT to LLE with long standing PVD  -  Heparin gtt for full anticoagulation  - ASA and Plavix as per vascular service    Dispo:  - patient remains critically ill and will require SICU admission Rhombic Flap Text: The defect edges were debeveled with a #15 scalpel blade.  Given the location of the defect and the proximity to free margins a rhombic flap was deemed most appropriate.  Using a sterile surgical marker, an appropriate rhombic flap was drawn incorporating the defect.    The area thus outlined was incised deep to adipose tissue with a #15 scalpel blade.  The skin margins were undermined to an appropriate distance in all directions utilizing iris scissors.

## 2021-11-04 NOTE — DISCHARGE NOTE ADULT - HOSPITAL COURSE
Patient is calling regarding questions about the length of the covid vaccine. Patient would like to only speak with Luis Alberto Sanches. Please call back at [phone].    PT ADMITTED WITH INCREASED SOB AND LOWER EXT EDEMA  PT WITH HX CKD  PT SEEN BY RENAL AND CARDIOLOGY   AND ORTHO HAD INJECTION RIGHT  SHOULDER  PT WITH DIURESIS   EDEMA RESOLVED AND DYSPNEA IMPROVED  AV FISTULA PLACED BY DR GILMAR VALERO  FOR EVENTUAL USE PT ADMITTED WITH INCREASED SOB AND LOWER EXT EDEMA  PT WITH HX CKD  PT SEEN BY RENAL AND CARDIOLOGY   AND ORTHO HAD INJECTION RIGHT  SHOULDER  PT WITH DIURESIS   EDEMA RESOLVED AND DYSPNEA IMPROVED  AV FISTULA PLACED BY DR GILMAR VALERO  FOR EVENTUAL USE   PT WITH ACUTE ON CHRONIC DIASTOLIC  HEAR FAILURE  PT WITH CKD ACUTE ON CHRONIC STAGE 4

## 2021-11-21 NOTE — CONSULT NOTE ADULT - SUBJECTIVE AND OBJECTIVE BOX
Patient is a 87y old  Male who presents with a chief complaint of     HPI:Patient was seen with patient's daughter who is a nurse at [employer].  Patient is a history of peripheral artery disease, TAVR, end-stage renal disease gets dialysis Monday and Friday, hypertension diabetes CEA.  He has had a popliteal bypass in March followed by DVT.  Within the last year he has had pleural effusions with requiring chest tubes.     The patient has been noted to be short of breath for the past 1 week.  Daughter states that his torsemide was increased.  Also noted to have worsening anemia.  A few weeks ago the patient's hemoglobin was 12.  9 days ago he was noted to be 8.5 at dialysis.  His Epogen was increased.  She states he got Epogen 3 times in the past 9 days.  He gets iron infusions during dialysis.  Patient was noted to have burgundy stool this morning.  He has no nausea no vomiting no heartburn.  States he had some mid abdominal discomfort after drinking coffee yesterday.  Had some looser stools for the past 1 day.  Patient is on aspirin.  He is on no other anticoagulation.  In the past he was on Eliquis and Plavix.  He has a known history of chronic anemia.  EGD in March of this year showed gastric AVMs which were ablated.  He had colonoscopy in 2020 which showed moderate to large polyp.  This polyp was removed.  He had a follow-up colonoscopy in June 2020 with removal of the remaining polyp.  He was also noted to have left-sided diverticulosis and internal hemorrhoids. Hemoglobin in August was 12 and now is 8.5.   INR 1.13, 54, creatitine 4.9             REVIEW OF SYSTEMS:  Constitutional: No fever, weight loss +fatigue  ENMT:  No difficulty hearing, tinnitus, vertigo; No sinus or throat pain  Respiratory: No cough, wheezing, chills or hemoptysis +SOB  Cardiovascular: No chest pain, palpitations, dizziness or leg swelling  Gastrointestinal: No abdominal or epigastric pain. No nausea, vomiting or hematemesis; No diarrhea or constipation. burgandy stool  Skin: No itching, burning, rashes or lesions   Musculoskeletal: No joint pain or swelling; No muscle, back or extremity pain    PAST MEDICAL & SURGICAL HISTORY:  DM (diabetes mellitus)  - resolved    AV block, 1st degree    Arrhythmia    CAD (coronary artery disease)    HTN (hypertension)    VT (ventricular tachycardia)    RICHARDS (dyspnea on exertion)    Anemia    Risk factors for obstructive sleep apnea    ESRD on dialysis  HD on Mondays and Fridays    Aortic stenosis  - s/p valve replacement    GI bleeding  due to ulcerated polyps and colonic AVMs    H/O circumcision  at  age  65    H/O left knee surgery    H/O angioplasty  2013,  no  intervention    A-V fistula  left arm 5/2017    H/O carotid endarterectomy  Right    S/P TAVR (transcatheter aortic valve replacement)    History of loop recorder        FAMILY HISTORY:  Family history of cancer (Grandparent)    Family history of lung cancer (Grandparent)    Family history of premature CAD    FH: type 2 diabetes        SOCIAL HISTORY:  Smoking Status: [ ] Current, [ ] Former, [ ] Never  Pack Years:  [  ] EtOH-no  [  ] IVDA    MEDICATIONS:  MEDICATIONS  (STANDING):    MEDICATIONS  (PRN):      Allergies    Plavix (Hives)  Toprol-XL (Rash)    Intolerances        Vital Signs Last 24 Hrs  T(C): 36.3 (21 Nov 2021 14:52), Max: 36.3 (21 Nov 2021 14:52)  T(F): 97.3 (21 Nov 2021 14:52), Max: 97.3 (21 Nov 2021 14:52)  HR: 65 (21 Nov 2021 14:52) (65 - 65)  BP: 115/52 (21 Nov 2021 14:52) (115/52 - 115/52)  BP(mean): --  RR: 16 (21 Nov 2021 14:52) (16 - 16)  SpO2: 85% (21 Nov 2021 14:52) (85% - 85%)        PHYSICAL EXAM:    General frail , elderly  in no acute distress  HEENT: MMM, conjunctiva and sclera clear  H- RRR  l- CTA   Gastrointestinal: Soft, non-tender non-distended; Normal bowel sounds; No rebound or guarding  Extremities: Normal range of motion, No clubbing, cyanosis or edema  Neurological: Alert and oriented x3  Skin: Warm and dry. No obvious rash  rectal- light brown stool mixed with small amount of dark blood       LABS:                        8.3    6.59  )-----------( 192      ( 21 Nov 2021 15:55 )             26.9     21 Nov 2021 15:55    140    |  95     |  54.0   ----------------------------<  138    4.8     |  26.0   |  4.91     Ca    9.4        21 Nov 2021 15:55    TPro  7.2    /  Alb  3.9    /  TBili  0.6    /  DBili  x      /  AST  49     /  ALT  13     /  AlkPhos  112    / Amylase x      /Lipase 32     21 Nov 2021 15:55              RADIOLOGY & ADDITIONAL STUDIES:

## 2021-11-21 NOTE — CONSULT NOTE ADULT - TIME BILLING
I answered all questions at bedside for patient's daughter.  I reviewed the EGD and colonoscopy report from March 2021 and colonoscopy 2020.  I reviewed all blood work and notes

## 2021-11-21 NOTE — H&P ADULT - HISTORY OF PRESENT ILLNESS
88 y/o male with PMH of ESRD on HD (M/F), anemia, CHFpEF, arrythmia, HTN, HLD, CAD, a-fib (used to be on Flecainide and no longer on AC due to GI bleed), AS s/p TAVR, and PAD (RLE fem-pop bypass October 2020) multiple gastric AVM's s/p cauterized s/p LLE fem-pop bypass, and had the bypass performed on 3/20/21, post procedure developed L femoral DVT and s/p revision of LLE bypass 3/25/21 came to the ED complaining of rectal bleed. As per daughter (Isela) at bed side, patient has been so weak in the last 1 week, wife reported patient was confused today. This morning, daughter went to see him, noted dark stool. Patient has no chest pain, shortness of breath, palpitation, abdominal pain, nausea, vomiting, fever, chills, recent travel, sick contact.

## 2021-11-21 NOTE — H&P ADULT - NEUROLOGICAL
PHYSICAL EXAM:  GENERAL: NAD, well-groomed, well-developed  HEAD:  Atraumatic, Normocephalic  EYES: EOMI, PERRLA, conjunctiva and sclera clear, Anemic conjunctiva  ENMT: No tonsillar erythema, exudates, or enlargement; Moist mucous membranes, Good dentition, No lesions  NECK: Supple, No JVD, Normal thyroid  NERVOUS SYSTEM:  Alert & Oriented X3, Good concentration; Motor Strength 5/5 B/L upper and lower extremities  CHEST/LUNG: No rales, rhonchi, wheezing, or rubs  HEART: Regular rate and rhythm; No murmurs, rubs, or gallops  ABDOMEN: Soft, Nontender, mildly distended; Bowel sounds present  EXTREMITIES:  2+ Peripheral Pulses bilaterally, No clubbing, cyanosis, bilateral leg 2+ pitting edema  LYMPH: No lymphadenopathy noted  SKIN: midline abdominal scar, clean.    T(C): 36.9 (04-10-18 @ 21:04), Max: 36.9 (04-10-18 @ 21:04)  HR: 84 (04-10-18 @ 21:04) (81 - 104)  BP: 112/67 (04-10-18 @ 21:04) (102/65 - 113/56)  RR: 18 (04-10-18 @ 21:04) (18 - 19)  SpO2: 99% (04-10-18 @ 21:04) (97% - 100%)  Wt(kg): --  I&O's Summary detailed exam

## 2021-11-21 NOTE — H&P ADULT - ASSESSMENT
86 y/o male with PMH of ESRD on HD (M/F), anemia, CHFpEF, arrythmia, HTN, HLD, CAD, a-fib (used to be on Flecainide and no longer on AC due to GI bleed), AS s/p TAVR, and PAD (RLE fem-pop bypass October 2020) multiple gastric AVM's s/p cauterized s/p LLE fem-pop bypass, and had the bypass performed on 3/20/21, post procedure developed L femoral DVT and s/p revision of LLE bypass 3/25/21 came to the ED complaining of rectal bleed. As per daughter (Isela) at bed side, patient has been so weak in the last 1 week, wife reported patient was confused today. This morning, daughter went to see him, noted dark stool.     Symptomatic anemia due to GI bleed   Admit to telemetry   Hb: 8.3 (was 12 in August)   FOBT positive   2 units of PRBC ordered, 1 unit given will hold the the next unit till AM with HD  Patient stable after 1 unit, not in respiratory distress, lungs cleared. Will hold diuretic for now.    Monitor CBC and transfused as needed   Patient was on PPI before, will restart 40mg   GI on board     ESRD on HD   On HD Monday and Fridays   Renal consulted   Sevelamer 800mg tid    Monitor renal function     HTN/HLD  Nifedipine 90mg AM and 60mg HS   Isosorbide 30mg   Will monitor BP, patient going for HD in AM, if SBP > 160 before HD will give medication if now hold for HD   Atorvastatin 80mg     CHFpEF  Patient is on Torsemide 20mg every day except M/F     CAD   Hold Aspirin 81mg pending stabilization of H/H  Resume as needed   Continue Statin     Supportive   DVT prophylaxis: CSD   Diet: renal   Code status: full   PT/CM ordered     Plan of care discussed with patient and daughter at bed side. Please call daughter (Isela @[phone]) for update

## 2021-11-21 NOTE — ED PROVIDER NOTE - CLINICAL SUMMARY MEDICAL DECISION MAKING FREE TEXT BOX
patient with acute gi bleed, hypoxic, drop in hemoglobin from 12 to 8, seen by gi, will transfuse with lasix between transfusions.  will admit for further eval.  family states R LE appears normal, but decreased DP pulse. signed out pending US arterial of PIERO KIM.

## 2021-11-21 NOTE — CONSULT NOTE ADULT - PROBLEM SELECTOR RECOMMENDATION 2
The patient has brown stool mixed with small amount of blood.  At this point patient's anemia may be multifactorial.  End-stage renal disease mixed with some rectal bleeding as well.  As patient has brown stool mixed with only small amount of blood tinge, a CTA will not be positive.  If however the patient begins to pass large amount of blood then he should be sent to a CTA with dialysis to follow.  At this time we will transfuse patient and manage his conservatively.  If hemoglobin does not remain stable then will consider EGD as well as CTA

## 2021-11-21 NOTE — H&P ADULT - NSHPPHYSICALEXAM_GEN_ALL_CORE
Vital Signs Last 24 Hrs  T(C): 36.3 (21 Nov 2021 14:52), Max: 36.9 (21 Nov 2021 03:06)  T(F): 97.3 (21 Nov 2021 14:52), Max: 98.5 (21 Nov 2021 03:06)  HR: 62 (21 Nov 2021 17:25) (62 - 65)  BP: 138/59 (21 Nov 2021 17:25) (115/52 - 143/56)  BP(mean): --  RR: 16 (21 Nov 2021 17:25) (16 - 18)  SpO2: 97% (21 Nov 2021 17:25) (85% - 97%)

## 2021-11-21 NOTE — ED ADULT TRIAGE NOTE - CHIEF COMPLAINT QUOTE
Pt has been having rectal bleeding that started today. Denies any abdominal pain. Also having SOB without exertion. Brought in by daughter for blood work. Appears pale, O2 sat is low. Has a h/o pleural effusion and needed a pigtail.

## 2021-11-21 NOTE — ED PROVIDER NOTE - OBJECTIVE STATEMENT
87yoM; with PMHx of HTN, HLD, CAD, HFpEF, s/p Right CEA for Carotid artery disease, ESRD on HD 2d/week (M/Fri) via LUE fistula, a-fib s/p flecainide w/ ILR placed 6/2020, AS s/p TAVR, and PAD (RLE fem-pop bypass October 2020) multiple gastric AVM's s/p cauterized s/p LLE fem-pop bypass, and had the bypass performed on 3/20/21, post procedure developed L femoral DVT and s/p revision of LLE bypass 3/25/21; now p/w rectal bleeding. patient's daughter states patient has been increasingly tired this week.  last night increasing malaise and confusion. daughter found patient with dark stool today.  denies f/c/s. c/o increasing eaton. denies cough. denies cp/sob/palp  PMH:   HTN, HLD, CAD, HFpEF, s/p Right CEA for Carotid artery disease, ESRD on HD 2d/week (M/Fri) via LUE fistula, a-fib s/p flecainide w/ ILR placed 6/2020, AS s/p TAVR, and PAD (RLE fem-pop bypass October 2020) multiple gastric AVM's s/p cauterized s/p LLE fem-pop bypass, and had the bypass performed on 3/20/21, post procedure developed L femoral DVT and s/p revision of LLE bypass 3/25/21  SOCIAL: denies smoking / denies illicit substance use / social EtOH

## 2021-11-21 NOTE — ED PROVIDER NOTE - NS ED ROS FT
Constitutional: (-) fever  (-)chills  (-)sweats  Eyes/ENT: (-)   Cardiovascular: (-) chest pain, (-) palpitations (-) edema   Respiratory: (-) cough, (+) shortness of breath   Gastrointestinal: (-)nausea  (-)vomiting, (-) diarrhea  (+) abdominal pain  +melena  :  (-)dysuria, (-)frequency, (-)urgency, (-)hematuria  Musculoskeletal: (-) neck pain, (-) back pain, (-) joint pain  Integumentary: (-) rash, (-) edema  Neurological: (-) headache, (-) altered mental status  (-)LOC

## 2021-11-21 NOTE — ED PROVIDER NOTE - PHYSICAL EXAMINATION
General:     NAD  Head:     NC/AT, EOMI, oral mucosa moist  Neck:     trachea midline  Lungs:     CTA b/l, no w/r/r  CVS:     S1S2, RRR, no m/g/r  Abd:     +BS, s/nt/nd, no organomegaly, +dark stool  Ext:    2+ radial and pedal pulses, 1+ pedal edema of L LE.   Neuro: AAOx3, no sensory/motor deficits

## 2021-11-21 NOTE — ED ADULT NURSE NOTE - OBJECTIVE STATEMENT
pt comes to ED with reports of increasing fatigue and SOB, pt receives dialysis Monday and Friday, last was Friday. daughter states began having dark maroon colored stool this morning x 1 after a BM. pt hypoxic on room air which is not normal for him. receives Procrit/Epogen injections at dialysis, last Hgb 8.5 as per family. pt with no AMS, afebrile, no reports of abdominal pain, no chest pain, skin warm dry and intact. MARQUEZ with strength and purpose, no headaches, no vision changes. hx GI bleeding in the past. abdomen soft non tender non distended. denies nausea/vomiting. recently had been told to double his Torsemide dosing to 80mg daily x 1 week due to pulmonary congestion.

## 2021-11-22 NOTE — CONSULT NOTE ADULT - ASSESSMENT
1) ESRD on HD  2) MBD of renal dx  3) Anemia of renal dx  4) Vol HTN    HD today  Consented  Orders in place  dw Dr Elder and HD RN

## 2021-11-22 NOTE — CONSULT NOTE ADULT - SUBJECTIVE AND OBJECTIVE BOX
[facility] DIVISION OF KIDNEY DISEASES AND HYPERTENSION -- INITIAL CONSULT NOTE  --------------------------------------------------------------------------------  HPI:  86 y/o male with PMH of ESRD on HD (M/F), anemia, CHFpEF, arrythmia, HTN, HLD, CAD, a-fib (used to be on Flecainide and no longer on AC due to GI bleed), AS s/p TAVR, and PAD (RLE fem-pop bypass October 2020) multiple gastric AVM's s/p cauterized s/p LLE fem-pop bypass, and had the bypass performed on 3/20/21, post procedure developed L femoral DVT and s/p revision of LLE bypass 3/25/21 came to the ED complaining of rectal bleed. As per daughter (Isela) at bed side, patient has been so weak in the last 1 week, wife reported patient was confused today. This morning, daughter went to see him, noted dark stool. Patient has no chest pain, shortness of breath, palpitation, abdominal pain, nausea, vomiting, fever, chills, recent travel, sick contact.   Seen/examined; consented for HD today; feels well; eating breakfast.      PAST HISTORY  --------------------------------------------------------------------------------  PAST MEDICAL & SURGICAL HISTORY:  DM (diabetes mellitus)  - resolved    AV block, 1st degree    Arrhythmia    CAD (coronary artery disease)    HTN (hypertension)    VT (ventricular tachycardia)    RICHARDS (dyspnea on exertion)    Anemia    Risk factors for obstructive sleep apnea    ESRD on dialysis  HD on Mondays and Fridays    Aortic stenosis  - s/p valve replacement    GI bleeding  due to ulcerated polyps and colonic AVMs    H/O circumcision  at  age  65    H/O left knee surgery    H/O angioplasty  2013,  no  intervention    A-V fistula  left arm 5/2017    H/O carotid endarterectomy  Right    S/P TAVR (transcatheter aortic valve replacement)    History of loop recorder      FAMILY HISTORY:  Family history of cancer (Grandparent)    Family history of lung cancer (Grandparent)    Family history of premature CAD    FH: type 2 diabetes      PAST SOCIAL HISTORY:    ALLERGIES & MEDICATIONS  --------------------------------------------------------------------------------  Allergies    Plavix (Hives)  Toprol-XL (Rash)    Intolerances      Standing Inpatient Medications  atorvastatin 80 milliGRAM(s) Oral at bedtime  DULoxetine 60 milliGRAM(s) Oral daily  influenza  Vaccine (HIGH DOSE) 0.7 milliLiter(s) IntraMuscular once  isosorbide   mononitrate ER Tablet (IMDUR) 30 milliGRAM(s) Oral daily  melatonin 1 milliGRAM(s) Oral at bedtime  multivitamin 1 Tablet(s) Oral daily  NIFEdipine XL 90 milliGRAM(s) Oral daily  NIFEdipine XL 60 milliGRAM(s) Oral at bedtime  pantoprazole    Tablet 40 milliGRAM(s) Oral before breakfast  sevelamer carbonate 800 milliGRAM(s) Oral three times a day  torsemide 20 milliGRAM(s) Oral <User Schedule>    PRN Inpatient Medications      REVIEW OF SYSTEMS  --------------------------------------------------------------------------------  Gen: No weight changes, fatigue, fevers/chills, weakness  Skin: No rashes  Head/Eyes/Ears/Mouth: No headache; Normal hearing; Normal vision w/o blurriness; No sinus pain/discomfort, sore throat  Respiratory: No dyspnea, cough, wheezing, hemoptysis  CV: No chest pain, PND, orthopnea  GI: No abdominal pain, diarrhea, constipation, nausea, vomiting, melena, hematochezia  : No increased frequency, dysuria, hematuria, nocturia  MSK: No joint pain/swelling; no back pain; no edema  Neuro: No dizziness/lightheadedness, weakness, seizures, numbness, tingling  Heme: No easy bruising or bleeding  Endo: No heat/cold intolerance  Psych: No significant nervousness, anxiety, stress, depression    All other systems were reviewed and are negative, except as noted.    VITALS/PHYSICAL EXAM  --------------------------------------------------------------------------------  T(C): 36.9 (11-22-21 @ 10:52), Max: 36.9 (11-22-21 @ 10:52)  HR: 83 (11-22-21 @ 10:52) (62 - 83)  BP: 136/50 (11-22-21 @ 10:52) (115/52 - 154/69)  RR: 18 (11-22-21 @ 10:52) (16 - 18)  SpO2: 93% (11-22-21 @ 10:52) (85% - 97%)  Wt(kg): --  Height (cm): 165.1 (11-21-21 @ 14:52)  Weight (kg): 72.6 (11-21-21 @ 14:52)  BMI (kg/m2): 26.6 (11-21-21 @ 14:52)  BSA (m2): 1.8 (11-21-21 @ 14:52)      11-21-21 @ 07:01  -  11-22-21 @ 07:00  --------------------------------------------------------  IN: 0 mL / OUT: 175 mL / NET: -175 mL      Physical Exam:  	Gen: NAD   	HEENT: PERRL, supple neck, clear oropharynx  	Pulm: CTA B/L  	CV: RRR, S1S2; no rub  	Back: No spinal or CVA tenderness; no sacral edema  	Abd: +BS, soft, nontender/nondistended  	: No suprapubic tenderness  	UE: Warm, FROM, no clubbing, intact strength; no edema; no asterixis  	LE: Warm, FROM, no clubbing, intact strength; no edema  	Neuro: No focal deficits, intact gait  	Psych: Normal affect and mood  	Skin: Warm, without rashes       LABS/STUDIES  --------------------------------------------------------------------------------              8.6    7.23  >-----------<  180      [11-22-21 @ 06:50]              27.3     140  |  95  |  54.0  ----------------------------<  138      [11-21-21 @ 15:55]  4.8   |  26.0  |  4.91        Ca     9.4     [11-21-21 @ 15:55]    TPro  7.2  /  Alb  3.9  /  TBili  0.6  /  DBili  x   /  AST  49  /  ALT  13  /  AlkPhos  112  [11-21-21 @ 15:55]    PT/INR: PT 13.0 , INR 1.13       [11-21-21 @ 15:55]  PTT: 27.0       [11-21-21 @ 15:55]    Troponin 0.48      [11-21-21 @ 15:55]    Creatinine Trend:  SCr 4.91 [11-21 @ 15:55]    Urinalysis - [06-04-21 @ 07:54]      Color Yellow / Appearance Clear / SG 1.010 / pH 8.0      Gluc Negative / Ketone Negative  / Bili Negative / Urobili Negative       Blood Large / Protein 100 / Leuk Est Negative / Nitrite Negative      RBC 3-5 / WBC 3-5 / Hyaline  / Gran  / Sq Epi  / Non Sq Epi Negative / Bacteria Negative      Iron 43, TIBC 249, %sat 17      [07-13-21 @ 16:07]  Ferritin 498      [07-13-21 @ 16:07]  HbA1c 4.9      [08-09-18 @ 05:54]  Lipid: chol 126, , HDL 60, LDL --      [06-03-21 @ 06:07]    HBsAb 78.7      [04-09-21 @ 16:45]  HBsAb Nonreact      [06-17-21 @ 11:47]  HBsAg Nonreact      [06-17-21 @ 11:47]  HBcAb Nonreact      [06-25-20 @ 02:07]  HCV 0.13, Nonreact      [06-17-21 @ 11:47]

## 2021-11-22 NOTE — PROGRESS NOTE ADULT - PROBLEM SELECTOR PLAN 1
Admit to telemetry   Hb: 8.3 (was 12 in August)   FOBT positive   S/p PRBC transfusion  Monitor CBC and transfused as needed   GI on board- plan for Upper EGD tomorrow (11/23)  - [facility] cardiology c/s (Dr. Naylor) placed for clearance

## 2021-11-22 NOTE — PROGRESS NOTE ADULT - ATTENDING COMMENTS
Patient seen and examined.  No more bleeding reported.  Seen in hemodialysis.  Hemoglobin is stable.  We will reassess tomorrow morning with the labs.  Possible EGD tomorrow.  Keep n.p.o. after midnight.  Discussed with patient's daughter Vanessa on the phone.

## 2021-11-22 NOTE — PROGRESS NOTE ADULT - SUBJECTIVE AND OBJECTIVE BOX
[facility] Division of Hospital Medicine    Chief Complaint:  GI bleed    SUBJECTIVE / OVERNIGHT EVENTS: No acute events overnight. Patient endorses feeling tired. Denies n/v/f/c/h/d.     Patient denies chest pain, SOB, abd pain, N/V, fever, chills, dysuria or any other complaints. All remainder ROS negative.     MEDICATIONS  (STANDING):  atorvastatin 80 milliGRAM(s) Oral at bedtime  DULoxetine 60 milliGRAM(s) Oral daily  influenza  Vaccine (HIGH DOSE) 0.7 milliLiter(s) IntraMuscular once  isosorbide   mononitrate ER Tablet (IMDUR) 30 milliGRAM(s) Oral daily  melatonin 1 milliGRAM(s) Oral at bedtime  multivitamin 1 Tablet(s) Oral daily  NIFEdipine XL 90 milliGRAM(s) Oral daily  NIFEdipine XL 60 milliGRAM(s) Oral at bedtime  pantoprazole    Tablet 40 milliGRAM(s) Oral before breakfast  sevelamer carbonate 800 milliGRAM(s) Oral three times a day  torsemide 20 milliGRAM(s) Oral <User Schedule>    MEDICATIONS  (PRN):        I&O's Summary    21 Nov 2021 07:01  -  22 Nov 2021 07:00  --------------------------------------------------------  IN: 0 mL / OUT: 175 mL / NET: -175 mL    22 Nov 2021 07:01  -  22 Nov 2021 14:59  --------------------------------------------------------  IN: 0 mL / OUT: 0 mL / NET: 0 mL        PHYSICAL EXAM:  Vital Signs Last 24 Hrs  T(C): 36.7 (22 Nov 2021 12:15), Max: 36.9 (22 Nov 2021 10:52)  T(F): 98.1 (22 Nov 2021 12:15), Max: 98.5 (22 Nov 2021 10:52)  HR: 72 (22 Nov 2021 12:15) (62 - 83)  BP: 134/65 (22 Nov 2021 12:15) (134/65 - 154/69)  BP(mean): --  RR: 18 (22 Nov 2021 12:15) (16 - 18)  SpO2: 91% (22 Nov 2021 12:15) (91% - 97%)        CONSTITUTIONAL: NAD, resting comformtably  RESPIRATORY: Normal respiratory effort; lungs are clear to auscultation bilaterally  CARDIOVASCULAR: Regular rate and rhythm, normal S1 and S2, no murmur/rub/gallop. LUE AVF w/ palpable thrill  ABDOMEN: Nontender to palpation, normoactive bowel sounds  PSYCH: A+O to person, place, and time; affect appropriate  NEUROLOGY: CN 2-12 are intact and symmetric; no gross sensory deficits    LABS:                        8.6    7.23  )-----------( 180      ( 22 Nov 2021 06:50 )             27.3     11-21    140  |  95<L>  |  54.0<H>  ----------------------------<  138<H>  4.8   |  26.0  |  4.91<H>    Ca    9.4      21 Nov 2021 15:55    TPro  7.2  /  Alb  3.9  /  TBili  0.6  /  DBili  x   /  AST  49<H>  /  ALT  13  /  AlkPhos  112  11-21    PT/INR - ( 21 Nov 2021 15:55 )   PT: 13.0 sec;   INR: 1.13 ratio         PTT - ( 21 Nov 2021 15:55 )  PTT:27.0 sec  CARDIAC MARKERS ( 21 Nov 2021 15:55 )  x     / 0.48 ng/mL / x     / x     / x              CAPILLARY BLOOD GLUCOSE            RADIOLOGY & ADDITIONAL TESTS:  Results Reviewed:   Imaging Personally Reviewed:  Electrocardiogram Personally Reviewed:

## 2021-11-22 NOTE — PROGRESS NOTE ADULT - PROBLEM SELECTOR PLAN 1
Patient's anemia probably multifactorial. Patient with ESRD on dialysis and s/p popliteal bypass (March 2021) followed by DVT. ANDRE showed brown-burgundy stool this AM. Patient denies any BMs overnight.   Hemoglobin 8.6. Patient scheduled for hemodialysis today and pending 1 Unit of PRBC.  -Continue to trend CBC and transfuse for a goal of 8 or higher.  -Continue Protonix for cytoprotection. Patient's anemia probably multifactorial. Patient with ESRD on dialysis and s/p popliteal bypass (March 2021) followed by DVT. ANDRE showed brown-burgundy stool this AM. Patient denies any BMs overnight.   Hemoglobin 8.6. Patient scheduled for hemodialysis today and pending 1 Unit of PRBC.  -Continue to trend CBC and transfuse for a goal of 8 or higher.  -Continue Protonix for cytoprotection.  -Plan for EGD tomorrow.  -Cardiology clearance appreciated.  -Will order, CBC< CMP, INR, Covid.

## 2021-11-22 NOTE — GOALS OF CARE CONVERSATION - ADVANCED CARE PLANNING - CONVERSATION DETAILS
Goal of care discussion had with patient and daughter Vanessa who is the health care proxy, she said patient is full code. Patient confirmed it as well; requesting aggressive measure as needed.

## 2021-11-22 NOTE — PROGRESS NOTE ADULT - SUBJECTIVE AND OBJECTIVE BOX
Chief Complaint:  Patient is a 87y old  Male who presents with a chief complaint of SOB. follow up for GI bleed.       Interval Events / Subjective: 88 yo male with a PMHx of peripheral artery disease, TAVR, end-stage renal disease gets dialysis Monday and Friday, chronic anemia, HTN, DM2, s/p popliteal bypass (March 2021) followed by DVT. Within the last year he has had pleural effusions with requiring chest tubes. EGD in March of this year showed gastric AVMs which were ablated.  He had colonoscopy in 2020 which showed moderate to large polyp (polypectomy).  He had a follow-up colonoscopy in June 2020 with removal of the remaining polyp.  He was also noted to have left-sided diverticulosis and internal hemorrhoids. Patient seen and evaluated at bedside, reporting no complaints, no overnight events. Patient Denies nausea, vomiting, abdominal pain, chest pain, shortness of breath. Abdomen soft, nontender. As per patient and nurse, No BMs reported overnight. ANDRE showed brown-burgundy stool this AM. Hemoglobin 8.6. Patient scheduled for hemodialysis today and pending 1 Unit of PRBC.        REVIEW OF SYSTEMS:   General: Negative  HEENT: Negative  CV: Negative  Respiratory: Negative  GI: See HPI  : Negative  MSK: Negative  Hematologic: Negative  Skin: Negative      MEDICATIONS:   MEDICATIONS  (STANDING):  atorvastatin 80 milliGRAM(s) Oral at bedtime  DULoxetine 60 milliGRAM(s) Oral daily  influenza  Vaccine (HIGH DOSE) 0.7 milliLiter(s) IntraMuscular once  isosorbide   mononitrate ER Tablet (IMDUR) 30 milliGRAM(s) Oral daily  melatonin 1 milliGRAM(s) Oral at bedtime  multivitamin 1 Tablet(s) Oral daily  NIFEdipine XL 90 milliGRAM(s) Oral daily  NIFEdipine XL 60 milliGRAM(s) Oral at bedtime  pantoprazole    Tablet 40 milliGRAM(s) Oral before breakfast  sevelamer carbonate 800 milliGRAM(s) Oral three times a day  torsemide 20 milliGRAM(s) Oral <User Schedule>    MEDICATIONS  (PRN):      ALLERGIES:   Allergies    Plavix (Hives)  Toprol-XL (Rash)    Intolerances        VITAL SIGNS:   Vital Signs Last 24 Hrs  T(C): 36.8 (22 Nov 2021 05:22), Max: 36.8 (22 Nov 2021 05:22)  T(F): 98.2 (22 Nov 2021 05:22), Max: 98.2 (22 Nov 2021 05:22)  HR: 73 (22 Nov 2021 05:22) (62 - 73)  BP: 154/69 (22 Nov 2021 05:22) (115/52 - 154/69)  BP(mean): --  RR: 18 (22 Nov 2021 05:22) (16 - 18)  SpO2: 96% (22 Nov 2021 05:22) (85% - 97%)  I&O's Summary    21 Nov 2021 07:01  -  22 Nov 2021 07:00  --------------------------------------------------------  IN: 0 mL / OUT: 175 mL / NET: -175 mL        PHYSICAL EXAM:   GENERAL:  Patient resting comfortably in bed, in no acute distress.   HEENT:  NC/AT,  conjunctivae clear, sclera -anicteric  CHEST:  Full & symmetric excursion, no increased effort, breath sounds clear  HEART:  Regular rhythm, S1, S2, no murmur/rub/S3/S4,  no edema  ABDOMEN:  Soft, non-tender, non-distended, normoactive bowel sounds,  no masses, no hepatosplenomegaly,   EXTREMITIES: No cyanosis, clubbing or edema  SKIN:  No rash/erythema/ecchymoses/petechiae/wounds/abscess/warm/dry  NEURO:  Alert, oriented      LABS:  CBC Full  -  ( 22 Nov 2021 06:50 )  WBC Count : 7.23 K/uL  RBC Count : 2.77 M/uL  Hemoglobin : 8.6 g/dL  Hematocrit : 27.3 %  Platelet Count - Automated : 180 K/uL  Mean Cell Volume : 98.6 fl  Mean Cell Hemoglobin : 31.0 pg  Mean Cell Hemoglobin Concentration : 31.5 gm/dL  Auto Neutrophil # : x  Auto Lymphocyte # : x  Auto Monocyte # : x  Auto Eosinophil # : x  Auto Basophil # : x  Auto Neutrophil % : x  Auto Lymphocyte % : x  Auto Monocyte % : x  Auto Eosinophil % : x  Auto Basophil % : x    11-21    140  |  95<L>  |  54.0<H>  ----------------------------<  138<H>  4.8   |  26.0  |  4.91<H>    Ca    9.4      21 Nov 2021 15:55    TPro  7.2  /  Alb  3.9  /  TBili  0.6  /  DBili  x   /  AST  49<H>  /  ALT  13  /  AlkPhos  112  11-21    LIVER FUNCTIONS - ( 21 Nov 2021 15:55 )  Alb: 3.9 g/dL / Pro: 7.2 g/dL / ALK PHOS: 112 U/L / ALT: 13 U/L / AST: 49 U/L / GGT: x           PT/INR - ( 21 Nov 2021 15:55 )   PT: 13.0 sec;   INR: 1.13 ratio         PTT - ( 21 Nov 2021 15:55 )  PTT:27.0 sec        RADIOLOGY & ADDITIONAL STUDIES (The following images were personally reviewed):         Chief Complaint:  Patient is a 87y old  Male who presents with a chief complaint of SOB. follow up for GI bleed.       Interval Events / Subjective: 86 yo male with a PMHx of peripheral artery disease, TAVR, end-stage renal disease gets dialysis Monday and Friday, chronic anemia, HTN, DM2, s/p popliteal bypass (March 2021) followed by DVT. Within the last year he has had pleural effusions with requiring chest tubes. EGD in March of this year showed gastric AVMs which were ablated.  He had colonoscopy in 2020 which showed moderate to large polyp (polypectomy).  He had a follow-up colonoscopy in June 2020 with removal of the remaining polyp.  He was also noted to have left-sided diverticulosis and internal hemorrhoids. Patient seen and evaluated at bedside, reporting no complaints, no overnight events. Patient reports feeling tired this morning. Patient Denies nausea, vomiting, abdominal pain, chest pain, shortness of breath. Abdomen soft, nontender. As per patient and nurse, No BMs reported overnight. ANDRE showed brown-burgundy stool this AM. Hemoglobin 8.6. Patient scheduled for hemodialysis today and pending 1 Unit of PRBC.        REVIEW OF SYSTEMS:   General: Negative  HEENT: Negative  CV: Negative  Respiratory: Negative  GI: See HPI  : Negative  MSK: Negative  Hematologic: Negative  Skin: Negative      MEDICATIONS:   MEDICATIONS  (STANDING):  atorvastatin 80 milliGRAM(s) Oral at bedtime  DULoxetine 60 milliGRAM(s) Oral daily  influenza  Vaccine (HIGH DOSE) 0.7 milliLiter(s) IntraMuscular once  isosorbide   mononitrate ER Tablet (IMDUR) 30 milliGRAM(s) Oral daily  melatonin 1 milliGRAM(s) Oral at bedtime  multivitamin 1 Tablet(s) Oral daily  NIFEdipine XL 90 milliGRAM(s) Oral daily  NIFEdipine XL 60 milliGRAM(s) Oral at bedtime  pantoprazole    Tablet 40 milliGRAM(s) Oral before breakfast  sevelamer carbonate 800 milliGRAM(s) Oral three times a day  torsemide 20 milliGRAM(s) Oral <User Schedule>    MEDICATIONS  (PRN):      ALLERGIES:   Allergies    Plavix (Hives)  Toprol-XL (Rash)    Intolerances        VITAL SIGNS:   Vital Signs Last 24 Hrs  T(C): 36.8 (22 Nov 2021 05:22), Max: 36.8 (22 Nov 2021 05:22)  T(F): 98.2 (22 Nov 2021 05:22), Max: 98.2 (22 Nov 2021 05:22)  HR: 73 (22 Nov 2021 05:22) (62 - 73)  BP: 154/69 (22 Nov 2021 05:22) (115/52 - 154/69)  BP(mean): --  RR: 18 (22 Nov 2021 05:22) (16 - 18)  SpO2: 96% (22 Nov 2021 05:22) (85% - 97%)  I&O's Summary    21 Nov 2021 07:01  -  22 Nov 2021 07:00  --------------------------------------------------------  IN: 0 mL / OUT: 175 mL / NET: -175 mL        PHYSICAL EXAM:   GENERAL:  Patient resting comfortably in bed, in no acute distress.   HEENT:  NC/AT,  conjunctivae clear, sclera -anicteric  CHEST:  Full & symmetric excursion, no increased effort, breath sounds clear  HEART:  Regular rhythm, S1, S2, no murmur/rub/S3/S4,  no edema  ABDOMEN:  Soft, non-tender, non-distended, normoactive bowel sounds,  no masses, no hepatosplenomegaly,   EXTREMITIES: No cyanosis, clubbing or edema  SKIN:  No rash/erythema/ecchymoses/petechiae/wounds/abscess/warm/dry  NEURO:  Alert, oriented      LABS:  CBC Full  -  ( 22 Nov 2021 06:50 )  WBC Count : 7.23 K/uL  RBC Count : 2.77 M/uL  Hemoglobin : 8.6 g/dL  Hematocrit : 27.3 %  Platelet Count - Automated : 180 K/uL  Mean Cell Volume : 98.6 fl  Mean Cell Hemoglobin : 31.0 pg  Mean Cell Hemoglobin Concentration : 31.5 gm/dL  Auto Neutrophil # : x  Auto Lymphocyte # : x  Auto Monocyte # : x  Auto Eosinophil # : x  Auto Basophil # : x  Auto Neutrophil % : x  Auto Lymphocyte % : x  Auto Monocyte % : x  Auto Eosinophil % : x  Auto Basophil % : x    11-21    140  |  95<L>  |  54.0<H>  ----------------------------<  138<H>  4.8   |  26.0  |  4.91<H>    Ca    9.4      21 Nov 2021 15:55    TPro  7.2  /  Alb  3.9  /  TBili  0.6  /  DBili  x   /  AST  49<H>  /  ALT  13  /  AlkPhos  112  11-21    LIVER FUNCTIONS - ( 21 Nov 2021 15:55 )  Alb: 3.9 g/dL / Pro: 7.2 g/dL / ALK PHOS: 112 U/L / ALT: 13 U/L / AST: 49 U/L / GGT: x           PT/INR - ( 21 Nov 2021 15:55 )   PT: 13.0 sec;   INR: 1.13 ratio         PTT - ( 21 Nov 2021 15:55 )  PTT:27.0 sec        RADIOLOGY & ADDITIONAL STUDIES (The following images were personally reviewed):

## 2021-11-23 NOTE — PROGRESS NOTE ADULT - PROBLEM SELECTOR PLAN 1
Admit to telemetry   Hb: 8.3 (was 12 in August)   FOBT positive   S/p PRBC transfusion  Monitor CBC and transfused as needed   [facility] cardiology c/s (Dr. Naylor)- clearance obtained. F/u TTE  GI on board- plan Upper EGD today

## 2021-11-23 NOTE — CONSULT NOTE ADULT - SUBJECTIVE AND OBJECTIVE BOX
[place] [facility] - [facility], THE HEART [facility]ENTER                                   [address] [place] [address]                                                      PHONE: [phone]                                                         FAX: [phone]  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 As per daughter (Isela) at bed side, patient has been so weak in the last 1 week, wife reported patient was confused. The daughter went to check on him, noted dark stool. Patient has no chest pain, shortness of breath, palpitation, abdominal pain, nausea, vomiting.  Poor functional activity < 4 METS     PAST MEDICAL & SURGICAL HISTORY:  DM (diabetes mellitus)  - resolved    AV block, 1st degree    Arrhythmia    CAD (coronary artery disease)    HTN (hypertension)    VT (ventricular tachycardia)    RICHARDS (dyspnea on exertion)    Anemia    Risk factors for obstructive sleep apnea    ESRD on dialysis  HD on Mondays and Fridays    Aortic stenosis  - s/p valve replacement    GI bleeding  due to ulcerated polyps and colonic AVMs    H/O circumcision  at  age  65    H/O left knee surgery    H/O angioplasty  2013,  no  intervention    A-V fistula  left arm 5/2017    H/O carotid endarterectomy  Right    S/P TAVR (transcatheter aortic valve replacement)    History of loop recorder        Plavix (Hives)  Toprol-XL (Rash)      MEDICATIONS  (STANDING):  atorvastatin 80 milliGRAM(s) Oral at bedtime  DULoxetine 60 milliGRAM(s) Oral daily  influenza  Vaccine (HIGH DOSE) 0.7 milliLiter(s) IntraMuscular once  isosorbide   mononitrate ER Tablet (IMDUR) 30 milliGRAM(s) Oral daily  melatonin 1 milliGRAM(s) Oral at bedtime  multivitamin 1 Tablet(s) Oral daily  NIFEdipine XL 90 milliGRAM(s) Oral daily  NIFEdipine XL 60 milliGRAM(s) Oral at bedtime  pantoprazole    Tablet 40 milliGRAM(s) Oral before breakfast  potassium chloride  10 mEq/100 mL IVPB 10 milliEquivalent(s) IV Intermittent every 1 hour  sevelamer carbonate 800 milliGRAM(s) Oral three times a day  torsemide 20 milliGRAM(s) Oral <User Schedule>    MEDICATIONS  (PRN):      Social History:  Cigarettes:         none            Alchohol:      none            Illicit Drug Abuse:  none     ROS: Negative other than as mentioned in HPI.    Vital Signs Last 24 Hrs  T(C): 36.8 (23 Nov 2021 08:51), Max: 37 (23 Nov 2021 04:32)  T(F): 98.3 (23 Nov 2021 08:51), Max: 98.6 (23 Nov 2021 04:32)  HR: 75 (23 Nov 2021 08:51) (67 - 83)  BP: 140/96 (23 Nov 2021 08:51) (134/65 - 156/66)  BP(mean): --  RR: 18 (23 Nov 2021 08:51) (18 - 18)  SpO2: 100% (23 Nov 2021 08:51) (91% - 100%)  ICU Vital Signs Last 24 Hrs  CHRSITIANO ELIZABETH  I&O's Detail    22 Nov 2021 07:01  -  23 Nov 2021 07:00  --------------------------------------------------------  IN:  Total IN: 0 mL    OUT:    Other (mL): 1500 mL  Total OUT: 1500 mL    Total NET: -1500 mL        I&O's Summary    22 Nov 2021 07:01  -  23 Nov 2021 07:00  --------------------------------------------------------  IN: 0 mL / OUT: 1500 mL / NET: -1500 mL      Drug Dosing Weight  CHRITSIANO ELIZABETH      PHYSICAL EXAM:  General: Appears well developed, alert and cooperative.  HEENT: Head; normocephalic, atraumatic.  Eyes: Pupils reactive, cornea wnl.  Neck: Supple, no nodes adenopathy, no NVD or carotid bruit or thyromegaly.  CARDIOVASCULAR: Normal S1 and S2, 1/6 murmur, rub, gallop or lift.   LUNGS: Decrease BS at the lower bases   ABDOMEN: Soft, nontender without mass or organomegaly. bowel sounds normoactive.  EXTREMITIES: No clubbing, cyanosis or edema. Distal pulses wnl.   SKIN: warm and dry with normal turgor.  NEURO: Alert/oriented x 3/normal motor exam. No pathologic reflexes.    PSYCH: normal affect.        LABS:                        10.7   8.79  )-----------( 196      ( 23 Nov 2021 06:43 )             33.3     11-23    138  |  95<L>  |  35.0<H>  ----------------------------<  91  3.4<L>   |  27.0  |  3.71<H>    Ca    9.3      23 Nov 2021 06:43    TPro  7.0  /  Alb  4.0  /  TBili  1.2  /  DBili  x   /  AST  10  /  ALT  8   /  AlkPhos  121<H>  11-23    CHRISTIANO ELIZABETH  CARDIAC MARKERS ( 21 Nov 2021 15:55 )  x     / 0.48 ng/mL / x     / x     / x          PT/INR - ( 23 Nov 2021 06:43 )   PT: 13.6 sec;   INR: 1.18 ratio         PTT - ( 23 Nov 2021 06:43 )  PTT:38.0 sec      RADIOLOGY & ADDITIONAL STUDIES:    INTERPRETATION OF TELEMETRY (personally reviewed):    ECG:    ECHO:  < from: TTE Echo Complete w/o Contrast w/ Doppler (06.02.21 @ 21:45) >    Summary:   1. There is moderate concentric left ventricular hypertrophy.   2. Normal global left ventricular systolic function.   3. Left ventricular ejection fraction, by visual estimation, is 60 to 65%.   4. Normal right ventricular size and function.   5. Severely enlarged left atrium.   6. Moderately enlarged right atrium.   7. Bioprosthesis in the aortic position with normal gradients.   8. Moderate mitral valve regurgitation.   9. Moderate mitral annular calcification.  10. Moderate tricuspid regurgitation.  11. Estimated pulmonary artery systolic pressure is 80.6 mmHg assuming a right atrial pressure of 8 mmHg, which is consistent with severe pulmonary hypertension.  12. Moderate mitral stenosis, increased gradient partly due to tachycardia.    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 As per daughter (Isela) at bed side, patient has been so weak in the last 1 week, wife reported patient was confused. The daughter went to check on him, noted dark stool. Patient has no chest pain, shortness of breath, palpitation, abdominal pain, nausea, vomiting.  Poor functional activity < 4 METS       Patient undergoing a low risk GI procedure in which patient has a moderate to high risk but at this time no active CV contraindication

## 2021-11-23 NOTE — PROGRESS NOTE ADULT - PROBLEM SELECTOR PLAN 5
Nifedipine 90mg AM and 60mg HS and Isosorbide 30mg   Will monitor BP
Nifedipine 90mg AM and 60mg HS and Isosorbide 30mg   Will monitor BP

## 2021-11-23 NOTE — PROGRESS NOTE ADULT - SUBJECTIVE AND OBJECTIVE BOX
[facility] Division of Hospital Medicine    Chief Complaint: GI bleed     SUBJECTIVE / OVERNIGHT EVENTS: No acute events overnight. HD stable. Denies bloody BM, n/v/f/c/h/d.    Patient denies chest pain, SOB, abd pain, N/V, fever, chills, dysuria or any other complaints. All remainder ROS negative.     MEDICATIONS  (STANDING):  atorvastatin 80 milliGRAM(s) Oral at bedtime  DULoxetine 60 milliGRAM(s) Oral daily  influenza  Vaccine (HIGH DOSE) 0.7 milliLiter(s) IntraMuscular once  isosorbide   mononitrate ER Tablet (IMDUR) 30 milliGRAM(s) Oral daily  melatonin 1 milliGRAM(s) Oral at bedtime  multivitamin 1 Tablet(s) Oral daily  NIFEdipine XL 90 milliGRAM(s) Oral daily  NIFEdipine XL 60 milliGRAM(s) Oral at bedtime  pantoprazole    Tablet 40 milliGRAM(s) Oral before breakfast  potassium chloride  10 mEq/100 mL IVPB 10 milliEquivalent(s) IV Intermittent every 1 hour  sevelamer carbonate 800 milliGRAM(s) Oral three times a day  torsemide 20 milliGRAM(s) Oral <User Schedule>    MEDICATIONS  (PRN):        I&O's Summary    22 Nov 2021 07:01  -  23 Nov 2021 07:00  --------------------------------------------------------  IN: 0 mL / OUT: 1500 mL / NET: -1500 mL    23 Nov 2021 07:01  -  23 Nov 2021 12:48  --------------------------------------------------------  IN: 0 mL / OUT: 100 mL / NET: -100 mL        PHYSICAL EXAM:  Vital Signs Last 24 Hrs  T(C): 36.8 (23 Nov 2021 08:51), Max: 37 (23 Nov 2021 04:32)  T(F): 98.3 (23 Nov 2021 08:51), Max: 98.6 (23 Nov 2021 04:32)  HR: 75 (23 Nov 2021 08:51) (67 - 75)  BP: 140/96 (23 Nov 2021 08:51) (140/96 - 156/66)  BP(mean): --  RR: 18 (23 Nov 2021 08:51) (18 - 18)  SpO2: 100% (23 Nov 2021 08:51) (92% - 100%)        CONSTITUTIONAL: resting comfortably ill-appearing  RESPIRATORY: Normal respiratory effort; lungs are clear to auscultation bilaterally  CARDIOVASCULAR: Regular rate and rhythm, normal S1 and S2, no murmur/rub/gallop  ABDOMEN: Nontender to palpation, normoactive bowel sounds, no rebound/guarding  PSYCH: A+O to person, place, and time; affect appropriate  NEUROLOGY: CN 2-12 are intact and symmetric; no gross sensory deficits;     LABS:                        10.7   8.79  )-----------( 196      ( 23 Nov 2021 06:43 )             33.3     11-23    138  |  95<L>  |  35.0<H>  ----------------------------<  91  3.4<L>   |  27.0  |  3.71<H>    Ca    9.3      23 Nov 2021 06:43    TPro  7.0  /  Alb  4.0  /  TBili  1.2  /  DBili  x   /  AST  10  /  ALT  8   /  AlkPhos  121<H>  11-23    PT/INR - ( 23 Nov 2021 06:43 )   PT: 13.6 sec;   INR: 1.18 ratio         PTT - ( 23 Nov 2021 06:43 )  PTT:38.0 sec  CARDIAC MARKERS ( 21 Nov 2021 15:55 )  x     / 0.48 ng/mL / x     / x     / x              CAPILLARY BLOOD GLUCOSE      POCT Blood Glucose.: 200 mg/dL (22 Nov 2021 22:06)        RADIOLOGY & ADDITIONAL TESTS:  Results Reviewed:   Imaging Personally Reviewed:  Electrocardiogram Personally Reviewed:

## 2021-11-23 NOTE — CHART NOTE - NSCHARTNOTEFT_GEN_A_CORE
PA NOTE-MEDICINE    Called by RN due to Pt's Daughter and Son concerned that Pt may be a "Little Lethargic' after Son speaking to him on phone. Daughter works at [employer] (Admin in ED).   Pt is a 88 y/o male Admitted for GI Bleed with PMH of ESRD on HD (M/F), anemia, CHFpEF, arrythmia, HTN, HLD, CAD, a-fib (used to be on Flecainide and no longer on AC due to GI bleed), AS s/p TAVR, and PAD (RLE fem-pop bypass October 2020) multiple gastric AVM's s/p cauterized s/p LLE fem-pop bypass, and had the bypass performed on 3/20/21, post procedure developed L femoral DVT and s/p revision of LLE bypass 3/25/21. As per daughter (Isela) at bed side, patient has been so weak in the last 1 week before admission.  On Day of Admission wife reported patient was confused.  CTH done in ED-Neg Acute   Pt is scheduled for EGD in AM     T(C): 36.7 (22 Nov 2021 22:27), Max: 36.9 (22 Nov 2021 10:52)  T(F): 98.1 (22 Nov 2021 22:27), Max: 98.5 (22 Nov 2021 10:52)  HR: 67 (22 Nov 2021 22:27) (67 - 83)  BP: 155/65 (22 Nov 2021 22:18) (134/65 - 155/65)  RR: 18 (22 Nov 2021 22:27) (18 - 18)  SpO2: 94% (22 Nov 2021 22:27) (91% - 96%)    General: WD Sl Cachetic Male Lying in Bed  Watching Football game NAD A & O x 3 (Person/Place/Situation) Not year but knows President Knows he is going for EGD with Dr Giron in AM for GI Bleed.   Follows Commands, Appropriate   No facies, Tongue Midline   MARQUEZ x 4   Cardiac: S1S2 +   Lungs: CTA B/l   Abd: NDNT No Guarding/Rigidity + BS x 4 Q  Integument: No Palor Warm/Dry    A/P Eval Pt due to Daughter/Son concerned for Lethargy   No Acute Findings  Called Pt's daughter Vanessa Douglas with Exam Results  She will be here in AM to see Pt   Continue to Monitor Pt   Recall PA if any changes in Pt Status PA NOTE-MEDICINE  (LATE ENTRY)    Called by RN due to Pt's Daughter and Son concerned that Pt may be a "Little Lethargic' after Son speaking to him on phone. Daughter works at [employer] (Admin in ED).   Pt is a 88 y/o male Admitted for GI Bleed with PMH of ESRD on HD (M/F), anemia, CHFpEF, arrythmia, HTN, HLD, CAD, a-fib (used to be on Flecainide and no longer on AC due to GI bleed), AS s/p TAVR, and PAD (RLE fem-pop bypass October 2020) multiple gastric AVM's s/p cauterized s/p LLE fem-pop bypass, and had the bypass performed on 3/20/21, post procedure developed L femoral DVT and s/p revision of LLE bypass 3/25/21. As per daughter (Isela) at bed side, patient has been so weak in the last 1 week before admission.  On Day of Admission wife reported patient was confused.  CTH done in ED-Neg Acute   Pt is scheduled for EGD in AM     T(C): 36.7 (22 Nov 2021 22:27), Max: 36.9 (22 Nov 2021 10:52)  T(F): 98.1 (22 Nov 2021 22:27), Max: 98.5 (22 Nov 2021 10:52)  HR: 67 (22 Nov 2021 22:27) (67 - 83)  BP: 155/65 (22 Nov 2021 22:18) (134/65 - 155/65)  RR: 18 (22 Nov 2021 22:27) (18 - 18)  SpO2: 94% (22 Nov 2021 22:27) (91% - 96%)    General: WD Sl Cachetic Male Lying in Bed  Watching Football game NAD A & O x 3 (Person/Place/Situation) Not year but knows President Knows he is going for EGD with Dr Giron in AM for GI Bleed.   Follows Commands, Appropriate   No facies, Tongue Midline   MARQUEZ x 4   Cardiac: S1S2 +   Lungs: CTA B/l   Abd: NDNT No Guarding/Rigidity + BS x 4 Q  Integument: No Palor Warm/Dry    A/P Eval Pt due to Daughter/Son concerned for Lethargy   No Acute Findings  Called Pt's daughter Vanessa Douglas with Exam Results  She will be here in AM to see Pt   Continue to Monitor Pt   Recall PA if any changes in Pt Status PA NOTE-MEDICINE  (LATE ENTRY)    Called by RN due to Pt's Daughter and Son concerned that Pt may be a "Little Lethargic' after Son speaking to him on phone. Daughter works at [employer] (Admin in ED).   Pt is a 86 y/o male Admitted for GI Bleed with PMH of ESRD on HD (M/F), anemia, CHFpEF, arrythmia, HTN, HLD, CAD, a-fib (used to be on Flecainide and no longer on AC due to GI bleed), AS s/p TAVR, and PAD (RLE fem-pop bypass October 2020) multiple gastric AVM's s/p cauterized s/p LLE fem-pop bypass, and had the bypass performed on 3/20/21, post procedure developed L femoral DVT and s/p revision of LLE bypass 3/25/21.  On Day of Admission wife reported patient was confused.  CTH done in ED-Neg Acute   Pt is scheduled for EGD in AM     T(C): 36.7 (22 Nov 2021 22:27), Max: 36.9 (22 Nov 2021 10:52)  T(F): 98.1 (22 Nov 2021 22:27), Max: 98.5 (22 Nov 2021 10:52)  HR: 67 (22 Nov 2021 22:27) (67 - 83)  BP: 155/65 (22 Nov 2021 22:18) (134/65 - 155/65)  RR: 18 (22 Nov 2021 22:27) (18 - 18)  SpO2: 94% (22 Nov 2021 22:27) (91% - 96%)    General: WD Sl Cachetic Male Lying in Bed  Watching Football game NAD A & O x 3 (Person/Place/Situation) Not year but knows President Knows he is going for EGD with Dr Giron in AM for GI Bleed.   Follows Commands, Appropriate   No facies, Tongue Midline   MARQUEZ x 4   Cardiac: S1S2 +   Lungs: CTA B/l   Abd: NDNT No Guarding/Rigidity + BS x 4 Q  Integument: No Palor Warm/Dry    A/P Eval Pt due to Daughter/Son concerned for Lethargy   No Acute Findings  Called Pt's daughter Vanessa Douglas with Exam Results  She will be here in AM to see Pt   Continue to Monitor Pt   Recall PA if any changes in Pt Status

## 2021-11-23 NOTE — PROGRESS NOTE ADULT - SUBJECTIVE AND OBJECTIVE BOX
[facility] DIVISION OF KIDNEY DISEASES AND HYPERTENSION -- HEMODIALYSIS NOTE  --------------------------------------------------------------------------------  Chief Complaint: ESRD/Ongoing hemodialysis requirement    24 hour events/subjective:  s/p HD yesterday= tolerated 1.5 kg UF      PAST HISTORY  --------------------------------------------------------------------------------  No significant changes to PMH, PSH, FHx, SHx, unless otherwise noted    ALLERGIES & MEDICATIONS  --------------------------------------------------------------------------------  Allergies    Plavix (Hives)  Toprol-XL (Rash)    Intolerances      Standing Inpatient Medications  atorvastatin 80 milliGRAM(s) Oral at bedtime  DULoxetine 60 milliGRAM(s) Oral daily  influenza  Vaccine (HIGH DOSE) 0.7 milliLiter(s) IntraMuscular once  isosorbide   mononitrate ER Tablet (IMDUR) 30 milliGRAM(s) Oral daily  melatonin 1 milliGRAM(s) Oral at bedtime  multivitamin 1 Tablet(s) Oral daily  NIFEdipine XL 90 milliGRAM(s) Oral daily  NIFEdipine XL 60 milliGRAM(s) Oral at bedtime  pantoprazole    Tablet 40 milliGRAM(s) Oral before breakfast  potassium chloride  10 mEq/100 mL IVPB 10 milliEquivalent(s) IV Intermittent every 1 hour  sevelamer carbonate 800 milliGRAM(s) Oral three times a day  torsemide 20 milliGRAM(s) Oral <User Schedule>    PRN Inpatient Medications      REVIEW OF SYSTEMS  --------------------------------------------------------------------------------  Gen: No weight changes, fatigue, fevers/chills, weakness  Skin: No rashes  Head/Eyes/Ears/Mouth: No headache  Respiratory: No dyspnea, cough,  CV: No chest pain, orthopnea  GI: No abdominal pain, diarrhea, constipation, nausea, vomiting,  MSK: No joint pain  Neuro: No dizziness/lightheadedness, weakness  Heme: No bleeding  Psych: No significant nervousness, anxiety, stress, depression    All other systems were reviewed and are negative, except as noted.    VITALS/PHYSICAL EXAM  --------------------------------------------------------------------------------  T(C): 36.8 (11-23-21 @ 08:51), Max: 37 (11-23-21 @ 04:32)  HR: 75 (11-23-21 @ 08:51) (67 - 75)  BP: 140/96 (11-23-21 @ 08:51) (134/65 - 156/66)  RR: 18 (11-23-21 @ 08:51) (18 - 18)  SpO2: 100% (11-23-21 @ 08:51) (91% - 100%)  Wt(kg): --  Height (cm): 165.1 (11-21-21 @ 14:52)  Weight (kg): 72.6 (11-21-21 @ 14:52)  BMI (kg/m2): 26.6 (11-21-21 @ 14:52)  BSA (m2): 1.8 (11-21-21 @ 14:52)      11-22-21 @ 07:01  -  11-23-21 @ 07:00  --------------------------------------------------------  IN: 0 mL / OUT: 1500 mL / NET: -1500 mL    11-23-21 @ 07:01  -  11-23-21 @ 11:34  --------------------------------------------------------  IN: 0 mL / OUT: 100 mL / NET: -100 mL      Physical Exam:  	Gen: NAD, pale  	HEENT: on NC  	Pulm: CTA B/L  	CV: RRR, S1S2; no rub  	Abd: +BS, soft, nontender  	UE: Warm, intact strength; no asterixis  	LE: Warm, minimal edema  	Neuro: No focal deficits  	Psych: Normal affect and mood  	Skin: Warm, without rashes  	Vascular access: AVF    LABS/STUDIES  --------------------------------------------------------------------------------              10.7   8.79  >-----------<  196      [11-23-21 @ 06:43]              33.3     138  |  95  |  35.0  ----------------------------<  91      [11-23-21 @ 06:43]  3.4   |  27.0  |  3.71        Ca     9.3     [11-23-21 @ 06:43]    TPro  7.0  /  Alb  4.0  /  TBili  1.2  /  DBili  x   /  AST  10  /  ALT  8   /  AlkPhos  121  [11-23-21 @ 06:43]    PT/INR: PT 13.6 , INR 1.18       [11-23-21 @ 06:43]  PTT: 38.0       [11-23-21 @ 06:43]    Troponin 0.48      [11-21-21 @ 15:55]    Iron 43, TIBC 249, %sat 17      [07-13-21 @ 16:07]  Ferritin 498      [07-13-21 @ 16:07]  HbA1c 4.9      [08-09-18 @ 05:54]  Lipid: chol 126, , HDL 60, LDL --      [06-03-21 @ 06:07]

## 2021-11-23 NOTE — PROGRESS NOTE ADULT - SUBJECTIVE AND OBJECTIVE BOX
Vascular surgery follow up    Patient seen and examined this AM by the resident team  Distal perfusion confirmed with doppler, bilateral DP  Patient with no complaint of rest pain.  Left Le with pitting edema below the knee    - Will order venous duplex to evaluate for dvt  - will notify the Primary team

## 2021-11-23 NOTE — PHYSICAL THERAPY INITIAL EVALUATION ADULT - ADDITIONAL COMMENTS
per patient he owns and uses a RW, requires assistance for most ADLs. He has a ramp to enter the home. Per patient he is without home O2 and will wake up feeling SOB. per patient he owns and uses a RW, requires assistance for most ADLs. Pt confused at times, unable to provide more detail to previous functional level and living situation. He has a ramp to enter the home. Per patient he is without home O2 and will wake up feeling SOB.

## 2021-11-23 NOTE — PROGRESS NOTE ADULT - PROBLEM SELECTOR PROBLEM 4
Chronic heart failure with preserved ejection fraction (HFpEF)
Chronic heart failure with preserved ejection fraction (HFpEF)

## 2021-11-23 NOTE — PROGRESS NOTE ADULT - PROBLEM SELECTOR PLAN 3
- PAD (RLE fem-pop bypass October 2020)  - vascular c/s placed- F/u US duplex to r/o DVT
- PAD (RLE fem-pop bypass October 2020)  - vascular c/s placed

## 2021-11-23 NOTE — PROGRESS NOTE ADULT - PROBLEM SELECTOR PLAN 7
DVT prophylaxis: CSD   Diet: renal   Code status: full   PT/CM ordered   - Dispo- Pending clinical improvement. Plan for Upper EGD on 11/23. PT C/s placed.    Updated patient's daughter (Isela @[phone]) on 11/22.
DVT prophylaxis: CSD   Diet: renal   Code status: full   PT- home w/ home PT  - Dispo- Pending clinical improvement. Plan for Upper EGD today. PT c/s placed.    Updated patient's daughter (Isela @[phone]) on 11/23.

## 2021-11-23 NOTE — PROGRESS NOTE ADULT - PROBLEM SELECTOR PLAN 2
No BMs reported overnight. ANDRE showed brown stool mixed with a very minimal amount of blood tinged, a CTA will not be positive as its not an active bleed.  A CTA may however be considered if patient begins to pass large amount of blood in stool then he should be sent to a CTA with dialysis right after.    At this time patient stable and will be treated conservatively.    If hemoglobin does not remain stable then will consider EGD as well as CTA.
On HD Monday and Fridays   Renal consulted   Sevelamer 800mg tid    Monitor renal function
On HD Monday and Fridays   Renal consulted   Sevelamer 800mg tid    Monitor renal function

## 2021-11-24 NOTE — DISCHARGE NOTE NURSING/CASE MANAGEMENT/SOCIAL WORK - PATIENT PORTAL LINK FT
You can access the FollowMyHealth Patient Portal offered by [facility] by registering at the following website: [email or web address]. By joining [facility]’s FollowMyHealth portal, you will also be able to view your health information using other applications (apps) compatible with our system.

## 2021-11-24 NOTE — DISCHARGE NOTE PROVIDER - NSDCCPCAREPLAN_GEN_ALL_CORE_FT
PRINCIPAL DISCHARGE DIAGNOSIS  Diagnosis: GI bleed  Assessment and Plan of Treatment: You presented with anemia and required 1 unit of blood transfusion. You had an upper endoscopy that showed gastritis. Please continue to take protonix two times a day. Please follow up with gastroenterology.      SECONDARY DISCHARGE DIAGNOSES  Diagnosis: ESRD on dialysis  Assessment and Plan of Treatment: Please continue with dialysis on Mon and Friday and continue with nephrology follow up.    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 No evidence of acute deep vein thrombosis. Occlusion of the proximal left femoral artery. Per vascular surgery recs, the recommendaionwas to start DOAC, but per discussion with family, decision made to hold AC.    Diagnosis: Chronic heart failure with preserved ejection fraction (HFpEF)  Assessment and Plan of Treatment: Please continue taking your home medications.    Diagnosis: Hyperlipidemia  Assessment and Plan of Treatment: Please continue taking your home medications.

## 2021-11-24 NOTE — DISCHARGE NOTE PROVIDER - CARE PROVIDERS DIRECT ADDRESSES
[email or web address]

## 2021-11-24 NOTE — PROGRESS NOTE ADULT - SUBJECTIVE AND OBJECTIVE BOX
[facility] DIVISION OF KIDNEY DISEASES AND HYPERTENSION -- FOLLOW UP NOTE  --------------------------------------------------------------------------------  Chief Complaint:  ESRD/Ongoing hemodialysis requirement    24 hour events/subjective:  HD Due Friday;  NAD      PAST HISTORY  --------------------------------------------------------------------------------  No significant changes to PMH, PSH, FHx, SHx, unless otherwise noted    ALLERGIES & MEDICATIONS  --------------------------------------------------------------------------------  Allergies    Plavix (Hives)  Toprol-XL (Rash)    Intolerances      Standing Inpatient Medications  atorvastatin 80 milliGRAM(s) Oral at bedtime  DULoxetine 60 milliGRAM(s) Oral daily  influenza  Vaccine (HIGH DOSE) 0.7 milliLiter(s) IntraMuscular once  isosorbide   mononitrate ER Tablet (IMDUR) 30 milliGRAM(s) Oral daily  melatonin 1 milliGRAM(s) Oral at bedtime  multivitamin 1 Tablet(s) Oral daily  NIFEdipine XL 90 milliGRAM(s) Oral daily  NIFEdipine XL 60 milliGRAM(s) Oral at bedtime  pantoprazole    Tablet 40 milliGRAM(s) Oral before breakfast  sevelamer carbonate 800 milliGRAM(s) Oral three times a day  torsemide 20 milliGRAM(s) Oral <User Schedule>    PRN Inpatient Medications      REVIEW OF SYSTEMS  --------------------------------------------------------------------------------  Gen: No weight changes, fatigue, fevers/chills, weakness  Skin: No rashes  Head/Eyes/Ears/Mouth: No headache; Normal hearing; Normal vision w/o blurriness; No sinus pain/discomfort, sore throat  Respiratory: No dyspnea, cough, wheezing, hemoptysis  CV: No chest pain, PND, orthopnea  GI: No abdominal pain, diarrhea, constipation, nausea, vomiting, melena, hematochezia  : No increased frequency, dysuria, hematuria, nocturia  MSK: No joint pain/swelling; no back pain; no edema  Neuro: No dizziness/lightheadedness, weakness, seizures, numbness, tingling  Heme: No easy bruising or bleeding  Endo: No heat/cold intolerance  Psych: No significant nervousness, anxiety, stress, depression    All other systems were reviewed and are negative, except as noted.    VITALS/PHYSICAL EXAM  --------------------------------------------------------------------------------  T(C): 36.7 (11-24-21 @ 08:13), Max: 37.1 (11-24-21 @ 04:13)  HR: 85 (11-24-21 @ 08:13) (68 - 85)  BP: 145/55 (11-24-21 @ 08:13) (145/55 - 169/77)  RR: 15 (11-24-21 @ 08:13) (15 - 18)  SpO2: 94% (11-24-21 @ 08:13) (90% - 96%)  Wt(kg): --        11-23-21 @ 07:01  -  11-24-21 @ 07:00  --------------------------------------------------------  IN: 0 mL / OUT: 200 mL / NET: -200 mL      Physical Exam:  	Gen: NAD, pale  	HEENT: on NC  	Pulm: CTA B/L  	CV: RRR, S1S2; no rub  	Abd: +BS, soft, nontender  	UE: Warm, intact strength; no asterixis  	LE: Warm, minimal edema  	Neuro: No focal deficits  	Psych: Normal affect and mood  	Skin: Warm, without rashes  	Vascular access: AVF    LABS/STUDIES  --------------------------------------------------------------------------------              10.0   7.08  >-----------<  181      [11-24-21 @ 06:40]              31.6     140  |  97  |  49.1  ----------------------------<  92      [11-24-21 @ 06:40]  3.6   |  24.0  |  4.81        Ca     9.4     [11-24-21 @ 06:40]    TPro  6.6  /  Alb  3.6  /  TBili  1.3  /  DBili  x   /  AST  10  /  ALT  10  /  AlkPhos  112  [11-24-21 @ 06:40]    PT/INR: PT 13.6 , INR 1.18       [11-23-21 @ 06:43]  PTT: 38.0       [11-23-21 @ 06:43]      Creatinine Trend:  SCr 4.81 [11-24 @ 06:40]  SCr 3.71 [11-23 @ 06:43]  SCr 4.91 [11-21 @ 15:55]        Iron 43, TIBC 249, %sat 17      [07-13-21 @ 16:07]  Ferritin 498      [07-13-21 @ 16:07]  HbA1c 4.9      [08-09-18 @ 05:54]  Lipid: chol 126, , HDL 60, LDL --      [06-03-21 @ 06:07]

## 2021-11-24 NOTE — PROGRESS NOTE ADULT - ASSESSMENT
1) ESRD on HD- TS schedule  2)  Anemia of CKD  3) HTN/ LE edema  4) CKD MBD    s/p HD Mon; due Fri  sp 2 U PRBC  Continue Torsemide 20 mg on non HD days
1) ESRD on HD- TS schedule  2)  Anemia of CKD  3) HTN/ LE edema  4) CKD MBD    s/p HD yesterday  received 2 U PRBC  +FOBT, plan for EGD today  BP stable, leg edema improved  Continue Torsemide 20 mg on non HD days  
88 y/o male with PMH of ESRD on HD (M/F), anemia, CHFpEF, arrythmia, HTN, HLD, CAD, a-fib (used to be on Flecainide and no longer on AC due to GI bleed), AS s/p TAVR, and PAD (RLE fem-pop bypass October 2020) multiple gastric AVM's s/p cauterized s/p LLE fem-pop bypass, and had the bypass performed on 3/20/21, post procedure developed L femoral DVT and s/p revision of LLE bypass 3/25/21 came to the ED complaining of rectal bleed. As per daughter (Isela) at bed side, patient has been so weak in the last 1 week, wife reported patient was confused today. This morning, daughter went to see him, noted dark stool. 
88 y/o male with PMH of ESRD on HD (M/F), anemia, CHFpEF, arrythmia, HTN, HLD, CAD, a-fib (used to be on Flecainide and no longer on AC due to GI bleed), AS s/p TAVR, and PAD (RLE fem-pop bypass October 2020) multiple gastric AVM's s/p cauterized s/p LLE fem-pop bypass, and had the bypass performed on 3/20/21, post procedure developed L femoral DVT and s/p revision of LLE bypass 3/25/21 came to the ED complaining of rectal bleed. As per daughter (Isela) at bed side, patient has been so weak in the last 1 week, wife reported patient was confused today. This morning, daughter went to see him, noted dark stool.

## 2021-11-24 NOTE — DISCHARGE NOTE PROVIDER - HOSPITAL COURSE
88 y/o male with PMH of ESRD on HD (M/F), anemia, CHFpEF, arrythmia, HTN, HLD, CAD, a-fib (used to be on Flecainide and no longer on AC due to GI bleed), AS s/p TAVR, and PAD (RLE fem-pop bypass October 2020) multiple gastric AVM's s/p cauterized s/p LLE fem-pop bypass, and had the bypass performed on 3/20/21, post procedure developed L femoral DVT and s/p revision of LLE bypass 3/25/21 came to the ED complaining of rectal bleed. As per daughter (Isela) at bed side, patient has been so weak in the last 1 week, wife reported patient was confused today. This morning, daughter went to see him, noted dark stool. Patient had an Upper EGD that revealed gastritis. Patient was started on protonix and will need outpatient follow up with gastroenterology. Patient had US Duplex that revealed chronic DVT and were found to have occlusion of the left femoral artery. Per discussion with family. Patient is medically optimized for discharge to home w/ home PT.    Vital Signs Last 24 Hrs  T(F): 98 (24 Nov 2021 08:13), Max: 98.7 (24 Nov 2021 04:13)  HR: 85 (24 Nov 2021 08:13) (68 - 85)  BP: 145/55 (24 Nov 2021 08:13) (145/55 - 169/77)  RR: 15 (24 Nov 2021 08:13) (15 - 18)  SpO2: 94% (24 Nov 2021 08:13) (90% - 96%)    Physical Exam:  CONSTITUTIONAL: resting comfortably ill-appearing  RESPIRATORY: Normal respiratory effort; lungs are clear to auscultation bilaterally  CARDIOVASCULAR: Regular rate and rhythm, normal S1 and S2, no murmur/rub/gallop  ABDOMEN: Nontender to palpation, normoactive bowel sounds, no rebound/guarding  PSYCH: A+O to person, place, and time; affect appropriate  NEUROLOGY: CN 2-12 are intact and symmetric; no gross sensory deficits    Time spent on patients discharge 35 minutes

## 2021-11-24 NOTE — PROGRESS NOTE ADULT - SUBJECTIVE AND OBJECTIVE BOX
Patient is a 87y old  Male who presents with a chief complaint of GI bleed (23 Nov 2021 12:48)      HPI:  88 y/o male with PMH of ESRD on HD (M/F), anemia, CHFpEF, arrythmia, HTN, HLD, CAD, a-fib (used to be on Flecainide and no longer on AC due to GI bleed), AS s/p TAVR, and PAD (RLE fem-pop bypass October 2020) multiple gastric AVM's s/p cauterized s/p LLE fem-pop bypass, and had the bypass performed on 3/20/21, post procedure developed L femoral DVT and s/p revision of LLE bypass 3/25/21. Patient has had no BM. EGD- no AVM's. Hgb stable    REVIEW OF SYSTEMS:  Constitutional: No fever, weight loss or fatigue  ENMT:  No difficulty hearing, tinnitus, vertigo; No sinus or throat pain  Respiratory: No cough, wheezing, chills or hemoptysis  Cardiovascular: No chest pain, palpitations, dizziness or leg swelling  Gastrointestinal: No abdominal or epigastric pain. No nausea, vomiting or hematemesis; No diarrhea or constipation. No melena or hematochezia.  Skin: No itching, burning, rashes or lesions   Musculoskeletal: No joint pain or swelling; No muscle, back or extremity pain    PAST MEDICAL & SURGICAL HISTORY:  DM (diabetes mellitus)  - resolved    AV block, 1st degree    Arrhythmia    CAD (coronary artery disease)    HTN (hypertension)    VT (ventricular tachycardia)    RICHARDS (dyspnea on exertion)    Anemia    Risk factors for obstructive sleep apnea    ESRD on dialysis  HD on Mondays and Fridays    Aortic stenosis  - s/p valve replacement    GI bleeding  due to ulcerated polyps and colonic AVMs    H/O circumcision  at  age  65    H/O left knee surgery    H/O angioplasty  2013,  no  intervention    A-V fistula  left arm 5/2017    H/O carotid endarterectomy  Right    S/P TAVR (transcatheter aortic valve replacement)    History of loop recorder        FAMILY HISTORY:  Family history of cancer (Grandparent)    Family history of lung cancer (Grandparent)    Family history of premature CAD    FH: type 2 diabetes        SOCIAL HISTORY:  Smoking Status: [ ] Current, [ ] Former, [ ] Never  Pack Years:  [  ] EtOH  [  ] IVDA    MEDICATIONS:  MEDICATIONS  (STANDING):  atorvastatin 80 milliGRAM(s) Oral at bedtime  DULoxetine 60 milliGRAM(s) Oral daily  influenza  Vaccine (HIGH DOSE) 0.7 milliLiter(s) IntraMuscular once  isosorbide   mononitrate ER Tablet (IMDUR) 30 milliGRAM(s) Oral daily  melatonin 1 milliGRAM(s) Oral at bedtime  multivitamin 1 Tablet(s) Oral daily  NIFEdipine XL 90 milliGRAM(s) Oral daily  NIFEdipine XL 60 milliGRAM(s) Oral at bedtime  pantoprazole    Tablet 40 milliGRAM(s) Oral before breakfast  sevelamer carbonate 800 milliGRAM(s) Oral three times a day  torsemide 20 milliGRAM(s) Oral <User Schedule>    MEDICATIONS  (PRN):      Allergies    Plavix (Hives)  Toprol-XL (Rash)    Intolerances        Vital Signs Last 24 Hrs  T(C): 36.7 (24 Nov 2021 08:13), Max: 37.1 (24 Nov 2021 04:13)  T(F): 98 (24 Nov 2021 08:13), Max: 98.7 (24 Nov 2021 04:13)  HR: 85 (24 Nov 2021 08:13) (68 - 85)  BP: 145/55 (24 Nov 2021 08:13) (145/55 - 169/77)  BP(mean): --  RR: 15 (24 Nov 2021 08:13) (15 - 18)  SpO2: 94% (24 Nov 2021 08:13) (90% - 96%)    11-23 @ 07:01  -  11-24 @ 07:00  --------------------------------------------------------  IN: 0 mL / OUT: 200 mL / NET: -200 mL          PHYSICAL EXAM:    General: ; in no acute distress  HEENT: MMM, conjunctiva and sclera clear  H-RRR  L-CTA  Gastrointestinal: Soft, non-tender non-distended; Normal bowel sounds; No rebound or guarding  Extremities: Normal range of motion, No clubbing, cyanosis or edema  Neurological: Alert and oriented x3  Skin: Warm and dry. No obvious rash      LABS:                        10.0   7.08  )-----------( 181      ( 24 Nov 2021 06:40 )             31.6     24 Nov 2021 06:40    140    |  97     |  49.1   ----------------------------<  92     3.6     |  24.0   |  4.81     Ca    9.4        24 Nov 2021 06:40    TPro  6.6    /  Alb  3.6    /  TBili  1.3    /  DBili  x      /  AST  10     /  ALT  10     /  AlkPhos  112    / Amylase x      /Lipase x      24 Nov 2021 06:40              RADIOLOGY & ADDITIONAL STUDIES:

## 2021-11-24 NOTE — DISCHARGE NOTE PROVIDER - NSDCMRMEDTOKEN_GEN_ALL_CORE_FT
aspirin 81 mg oral tablet, chewable: 1 tab(s) orally once a day  atorvastatin 80 mg oral tablet: 1 tab(s) orally once a day (at bedtime)  DULoxetine 60 mg oral delayed release capsule: 1 cap(s) orally once a day  isosorbide mononitrate 30 mg oral tablet, extended release: 1 tab(s) orally once a day  Melatonin 1 mg oral tablet: 1 tab(s) orally once a day (at bedtime)  Nephro-Thomas oral tablet: 1 tab(s) orally once a day  NIFEdipine 90 mg oral tablet, extended release: 1 tab(s) orally once a day  pantoprazole 40 mg oral delayed release tablet: 1 tab(s) orally 2 times a day  sevelamer hydrochloride 800 mg oral tablet: 1 tab(s) orally 3 times a day  torsemide 20 mg oral tablet: 1 tab(s) orally on non HD days

## 2021-11-29 PROBLEM — N18.4 ANEMIA IN STAGE 4 CHRONIC KIDNEY DISEASE: Status: RESOLVED | Noted: 2018-04-23 | Resolved: 2021-01-01

## 2021-12-07 NOTE — PATIENT PROFILE ADULT - HAVE YOU EXPERIENCED A TRAUMATIC EVENT?
Per CNA, Pt has been having large BMs over night. Pt had 1 large BM just now, liquid in consistency, tan/brown.   no
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
## 2022-01-01 ENCOUNTER — APPOINTMENT (OUTPATIENT)
Dept: HOME HEALTH SERVICES | Facility: HOME HEALTH | Age: 87
End: 2022-01-01

## 2022-01-01 ENCOUNTER — TRANSCRIPTION ENCOUNTER (OUTPATIENT)
Age: 87
End: 2022-01-01

## 2022-01-01 ENCOUNTER — APPOINTMENT (OUTPATIENT)
Dept: ELECTROPHYSIOLOGY | Facility: CLINIC | Age: 87
End: 2022-01-01
Payer: MEDICARE

## 2022-01-01 ENCOUNTER — NON-APPOINTMENT (OUTPATIENT)
Age: 87
End: 2022-01-01

## 2022-01-01 ENCOUNTER — APPOINTMENT (OUTPATIENT)
Dept: VASCULAR SURGERY | Facility: CLINIC | Age: 87
End: 2022-01-01
Payer: MEDICARE

## 2022-01-01 ENCOUNTER — APPOINTMENT (OUTPATIENT)
Dept: HOME HEALTH SERVICES | Facility: HOME HEALTH | Age: 87
End: 2022-01-01
Payer: MEDICARE

## 2022-01-01 ENCOUNTER — RX RENEWAL (OUTPATIENT)
Age: 87
End: 2022-01-01

## 2022-01-01 ENCOUNTER — APPOINTMENT (OUTPATIENT)
Dept: VASCULAR SURGERY | Facility: CLINIC | Age: 87
End: 2022-01-01

## 2022-01-01 ENCOUNTER — APPOINTMENT (OUTPATIENT)
Dept: VASCULAR SURGERY | Facility: HOSPITAL | Age: 87
End: 2022-01-01

## 2022-01-01 ENCOUNTER — INPATIENT (INPATIENT)
Facility: HOSPITAL | Age: 87
LOS: 16 days | Discharge: HOME | DRG: 871 | End: 2022-07-05
Attending: HOSPITALIST | Admitting: STUDENT IN AN ORGANIZED HEALTH CARE EDUCATION/TRAINING PROGRAM
Payer: MEDICARE

## 2022-01-01 ENCOUNTER — RESULT REVIEW (OUTPATIENT)
Age: 87
End: 2022-01-01

## 2022-01-01 ENCOUNTER — FORM ENCOUNTER (OUTPATIENT)
Age: 87
End: 2022-01-01

## 2022-01-01 ENCOUNTER — INPATIENT (INPATIENT)
Facility: HOSPITAL | Age: 87
LOS: 15 days | Discharge: HOME | DRG: 239 | End: 2022-06-16
Attending: SURGERY | Admitting: SURGERY
Payer: MEDICARE

## 2022-01-01 ENCOUNTER — INPATIENT (INPATIENT)
Facility: HOSPITAL | Age: 87
LOS: 7 days | Discharge: HOME | DRG: 393 | End: 2022-05-17
Attending: STUDENT IN AN ORGANIZED HEALTH CARE EDUCATION/TRAINING PROGRAM | Admitting: HOSPITALIST
Payer: MEDICARE

## 2022-01-01 ENCOUNTER — APPOINTMENT (OUTPATIENT)
Dept: INTERNAL MEDICINE | Facility: CLINIC | Age: 87
End: 2022-01-01

## 2022-01-01 ENCOUNTER — INPATIENT (INPATIENT)
Facility: HOSPITAL | Age: 87
LOS: 20 days | Discharge: HOME | DRG: 270 | End: 2022-04-27
Attending: SURGERY | Admitting: SURGERY
Payer: MEDICARE

## 2022-01-01 VITALS
OXYGEN SATURATION: 100 % | WEIGHT: 149.91 LBS | SYSTOLIC BLOOD PRESSURE: 85 MMHG | HEIGHT: 65 IN | DIASTOLIC BLOOD PRESSURE: 46 MMHG | HEART RATE: 70 BPM | RESPIRATION RATE: 24 BRPM

## 2022-01-01 VITALS
TEMPERATURE: 97.4 F | DIASTOLIC BLOOD PRESSURE: 62 MMHG | OXYGEN SATURATION: 88 % | RESPIRATION RATE: 14 BRPM | HEART RATE: 62 BPM | SYSTOLIC BLOOD PRESSURE: 122 MMHG

## 2022-01-01 VITALS
DIASTOLIC BLOOD PRESSURE: 87 MMHG | SYSTOLIC BLOOD PRESSURE: 145 MMHG | RESPIRATION RATE: 20 BRPM | TEMPERATURE: 98 F | HEIGHT: 65 IN | HEART RATE: 81 BPM | OXYGEN SATURATION: 98 %

## 2022-01-01 VITALS
TEMPERATURE: 98 F | SYSTOLIC BLOOD PRESSURE: 132 MMHG | OXYGEN SATURATION: 98 % | HEART RATE: 108 BPM | RESPIRATION RATE: 18 BRPM | DIASTOLIC BLOOD PRESSURE: 54 MMHG

## 2022-01-01 VITALS
SYSTOLIC BLOOD PRESSURE: 123 MMHG | OXYGEN SATURATION: 96 % | DIASTOLIC BLOOD PRESSURE: 62 MMHG | RESPIRATION RATE: 18 BRPM | HEART RATE: 82 BPM | TEMPERATURE: 98 F

## 2022-01-01 VITALS
SYSTOLIC BLOOD PRESSURE: 115 MMHG | WEIGHT: 149.91 LBS | DIASTOLIC BLOOD PRESSURE: 52 MMHG | TEMPERATURE: 99 F | RESPIRATION RATE: 20 BRPM | OXYGEN SATURATION: 86 % | HEIGHT: 65 IN | HEART RATE: 59 BPM

## 2022-01-01 VITALS
DIASTOLIC BLOOD PRESSURE: 59 MMHG | TEMPERATURE: 97.3 F | HEART RATE: 65 BPM | OXYGEN SATURATION: 92 % | RESPIRATION RATE: 14 BRPM | SYSTOLIC BLOOD PRESSURE: 115 MMHG

## 2022-01-01 VITALS
HEART RATE: 76 BPM | OXYGEN SATURATION: 97 % | WEIGHT: 149.91 LBS | HEIGHT: 65 IN | TEMPERATURE: 97 F | DIASTOLIC BLOOD PRESSURE: 40 MMHG | RESPIRATION RATE: 16 BRPM | SYSTOLIC BLOOD PRESSURE: 110 MMHG

## 2022-01-01 VITALS
OXYGEN SATURATION: 95 % | SYSTOLIC BLOOD PRESSURE: 160 MMHG | TEMPERATURE: 98 F | RESPIRATION RATE: 18 BRPM | DIASTOLIC BLOOD PRESSURE: 69 MMHG | HEART RATE: 78 BPM

## 2022-01-01 VITALS — DIASTOLIC BLOOD PRESSURE: 75 MMHG | HEART RATE: 64 BPM | SYSTOLIC BLOOD PRESSURE: 144 MMHG

## 2022-01-01 DIAGNOSIS — I48.91 UNSPECIFIED ATRIAL FIBRILLATION: ICD-10-CM

## 2022-01-01 DIAGNOSIS — Z98.62 PERIPHERAL VASCULAR ANGIOPLASTY STATUS: Chronic | ICD-10-CM

## 2022-01-01 DIAGNOSIS — K62.89 OTHER SPECIFIED DISEASES OF ANUS AND RECTUM: ICD-10-CM

## 2022-01-01 DIAGNOSIS — T81.89XA OTHER COMPLICATIONS OF PROCEDURES, NOT ELSEWHERE CLASSIFIED, INITIAL ENCOUNTER: ICD-10-CM

## 2022-01-01 DIAGNOSIS — N18.4 CHRONIC KIDNEY DISEASE, STAGE 4 (SEVERE): ICD-10-CM

## 2022-01-01 DIAGNOSIS — E11.21 TYPE 2 DIABETES MELLITUS WITH DIABETIC NEPHROPATHY: ICD-10-CM

## 2022-01-01 DIAGNOSIS — I73.9 PERIPHERAL VASCULAR DISEASE, UNSPECIFIED: ICD-10-CM

## 2022-01-01 DIAGNOSIS — Z98.890 OTHER SPECIFIED POSTPROCEDURAL STATES: Chronic | ICD-10-CM

## 2022-01-01 DIAGNOSIS — N18.6 END STAGE RENAL DISEASE: ICD-10-CM

## 2022-01-01 DIAGNOSIS — I77.0 ARTERIOVENOUS FISTULA, ACQUIRED: Chronic | ICD-10-CM

## 2022-01-01 DIAGNOSIS — Z95.2 PRESENCE OF PROSTHETIC HEART VALVE: Chronic | ICD-10-CM

## 2022-01-01 DIAGNOSIS — I96 GANGRENE, NOT ELSEWHERE CLASSIFIED: ICD-10-CM

## 2022-01-01 DIAGNOSIS — R50.9 FEVER, UNSPECIFIED: ICD-10-CM

## 2022-01-01 DIAGNOSIS — I50.9 HEART FAILURE, UNSPECIFIED: ICD-10-CM

## 2022-01-01 DIAGNOSIS — S78.112A COMPLETE TRAUMATIC AMPUTATION AT LVL BETWEEN LEFT HIP AND KNEE, INITIAL ENCOUNTER: ICD-10-CM

## 2022-01-01 DIAGNOSIS — J18.9 PNEUMONIA, UNSPECIFIED ORGANISM: ICD-10-CM

## 2022-01-01 DIAGNOSIS — R57.8 OTHER SHOCK: ICD-10-CM

## 2022-01-01 DIAGNOSIS — N18.9 CHRONIC KIDNEY DISEASE, UNSPECIFIED: ICD-10-CM

## 2022-01-01 DIAGNOSIS — R41.0 DISORIENTATION, UNSPECIFIED: ICD-10-CM

## 2022-01-01 DIAGNOSIS — I77.0 ARTERIOVENOUS FISTULA, ACQUIRED: ICD-10-CM

## 2022-01-01 DIAGNOSIS — I25.10 ATHEROSCLEROTIC HEART DISEASE OF NATIVE CORONARY ARTERY W/OUT ANGINA PECTORIS: ICD-10-CM

## 2022-01-01 DIAGNOSIS — Z98.890 OTHER SPECIFIED POSTPROCEDURAL STATES: ICD-10-CM

## 2022-01-01 DIAGNOSIS — K92.1 MELENA: ICD-10-CM

## 2022-01-01 DIAGNOSIS — K92.2 GASTROINTESTINAL HEMORRHAGE, UNSPECIFIED: ICD-10-CM

## 2022-01-01 DIAGNOSIS — Z99.2 END STAGE RENAL DISEASE: ICD-10-CM

## 2022-01-01 DIAGNOSIS — K62.5 HEMORRHAGE OF ANUS AND RECTUM: ICD-10-CM

## 2022-01-01 DIAGNOSIS — Z71.89 OTHER SPECIFIED COUNSELING: ICD-10-CM

## 2022-01-01 DIAGNOSIS — D63.1 CHRONIC KIDNEY DISEASE, UNSPECIFIED: ICD-10-CM

## 2022-01-01 DIAGNOSIS — I10 ESSENTIAL (PRIMARY) HYPERTENSION: ICD-10-CM

## 2022-01-01 DIAGNOSIS — I70.25 ATHEROSCLEROSIS OF NATIVE ARTERIES OF OTHER EXTREMITIES WITH ULCERATION: ICD-10-CM

## 2022-01-01 DIAGNOSIS — J90 PLEURAL EFFUSION, NOT ELSEWHERE CLASSIFIED: ICD-10-CM

## 2022-01-01 DIAGNOSIS — E11.9 TYPE 2 DIABETES MELLITUS W/OUT COMPLICATIONS: ICD-10-CM

## 2022-01-01 LAB
A PHAGOCYTOPH IGG TITR SER IF: SIGNIFICANT CHANGE UP TITER
A1C WITH ESTIMATED AVERAGE GLUCOSE RESULT: 5 % — SIGNIFICANT CHANGE UP (ref 4–5.6)
ACANTHOCYTES BLD QL SMEAR: SLIGHT — SIGNIFICANT CHANGE UP
ACANTHOCYTES BLD QL SMEAR: SLIGHT — SIGNIFICANT CHANGE UP
ALBUMIN FLD-MCNC: 1.6 G/DL — SIGNIFICANT CHANGE UP
ALBUMIN SERPL ELPH-MCNC: 2.7 G/DL — LOW (ref 3.3–5.2)
ALBUMIN SERPL ELPH-MCNC: 2.8 G/DL — LOW (ref 3.3–5.2)
ALBUMIN SERPL ELPH-MCNC: 2.9 G/DL — LOW (ref 3.3–5.2)
ALBUMIN SERPL ELPH-MCNC: 3 G/DL — LOW (ref 3.3–5.2)
ALBUMIN SERPL ELPH-MCNC: 3.1 G/DL — LOW (ref 3.3–5.2)
ALBUMIN SERPL ELPH-MCNC: 3.2 G/DL — LOW (ref 3.3–5.2)
ALBUMIN SERPL ELPH-MCNC: 3.3 G/DL — SIGNIFICANT CHANGE UP (ref 3.3–5.2)
ALBUMIN SERPL ELPH-MCNC: 3.4 G/DL — SIGNIFICANT CHANGE UP (ref 3.3–5.2)
ALBUMIN SERPL ELPH-MCNC: 3.8 G/DL — SIGNIFICANT CHANGE UP (ref 3.3–5.2)
ALBUMIN SERPL ELPH-MCNC: 4 G/DL — SIGNIFICANT CHANGE UP (ref 3.3–5.2)
ALP SERPL-CCNC: 159 U/L — HIGH (ref 40–120)
ALP SERPL-CCNC: 171 U/L — HIGH (ref 40–120)
ALP SERPL-CCNC: 188 U/L — HIGH (ref 40–120)
ALP SERPL-CCNC: 193 U/L — HIGH (ref 40–120)
ALP SERPL-CCNC: 198 U/L — HIGH (ref 40–120)
ALP SERPL-CCNC: 203 U/L — HIGH (ref 40–120)
ALP SERPL-CCNC: 203 U/L — HIGH (ref 40–120)
ALP SERPL-CCNC: 221 U/L — HIGH (ref 40–120)
ALP SERPL-CCNC: 243 U/L — HIGH (ref 40–120)
ALP SERPL-CCNC: 255 U/L — HIGH (ref 40–120)
ALP SERPL-CCNC: 273 U/L — HIGH (ref 40–120)
ALP SERPL-CCNC: 273 U/L — HIGH (ref 40–120)
ALP SERPL-CCNC: 281 U/L — HIGH (ref 40–120)
ALP SERPL-CCNC: 299 U/L — HIGH (ref 40–120)
ALP SERPL-CCNC: 311 U/L — HIGH (ref 40–120)
ALP SERPL-CCNC: 325 U/L — HIGH (ref 40–120)
ALT FLD-CCNC: 119 U/L — HIGH
ALT FLD-CCNC: 13 U/L — SIGNIFICANT CHANGE UP
ALT FLD-CCNC: 15 U/L — SIGNIFICANT CHANGE UP
ALT FLD-CCNC: 15 U/L — SIGNIFICANT CHANGE UP
ALT FLD-CCNC: 16 U/L — SIGNIFICANT CHANGE UP
ALT FLD-CCNC: 16 U/L — SIGNIFICANT CHANGE UP
ALT FLD-CCNC: 17 U/L — SIGNIFICANT CHANGE UP
ALT FLD-CCNC: 17 U/L — SIGNIFICANT CHANGE UP
ALT FLD-CCNC: 20 U/L — SIGNIFICANT CHANGE UP
ALT FLD-CCNC: 20 U/L — SIGNIFICANT CHANGE UP
ALT FLD-CCNC: 206 U/L — HIGH
ALT FLD-CCNC: 22 U/L — SIGNIFICANT CHANGE UP
ALT FLD-CCNC: 23 U/L — SIGNIFICANT CHANGE UP
ALT FLD-CCNC: 23 U/L — SIGNIFICANT CHANGE UP
ALT FLD-CCNC: 326 U/L — HIGH
ALT FLD-CCNC: 80 U/L — HIGH
AMMONIA BLD-MCNC: 32 UMOL/L — SIGNIFICANT CHANGE UP (ref 11–55)
ANION GAP SERPL CALC-SCNC: 12 MMOL/L — SIGNIFICANT CHANGE UP (ref 5–17)
ANION GAP SERPL CALC-SCNC: 13 MMOL/L — SIGNIFICANT CHANGE UP (ref 5–17)
ANION GAP SERPL CALC-SCNC: 14 MMOL/L — SIGNIFICANT CHANGE UP (ref 5–17)
ANION GAP SERPL CALC-SCNC: 15 MMOL/L — SIGNIFICANT CHANGE UP (ref 5–17)
ANION GAP SERPL CALC-SCNC: 16 MMOL/L — SIGNIFICANT CHANGE UP (ref 5–17)
ANION GAP SERPL CALC-SCNC: 17 MMOL/L — SIGNIFICANT CHANGE UP (ref 5–17)
ANION GAP SERPL CALC-SCNC: 18 MMOL/L — HIGH (ref 5–17)
ANION GAP SERPL CALC-SCNC: 19 MMOL/L — HIGH (ref 5–17)
ANION GAP SERPL CALC-SCNC: 20 MMOL/L — HIGH (ref 5–17)
ANION GAP SERPL CALC-SCNC: 21 MMOL/L — HIGH (ref 5–17)
ANION GAP SERPL CALC-SCNC: 24 MMOL/L — HIGH (ref 5–17)
ANISOCYTOSIS BLD QL: SLIGHT — SIGNIFICANT CHANGE UP
APPEARANCE UR: CLEAR — SIGNIFICANT CHANGE UP
APTT BLD: 102.6 SEC — HIGH (ref 27.5–35.5)
APTT BLD: 115.6 SEC — HIGH (ref 27.5–35.5)
APTT BLD: 27.1 SEC — LOW (ref 27.5–35.5)
APTT BLD: 32 SEC — SIGNIFICANT CHANGE UP (ref 27.5–35.5)
APTT BLD: 33.7 SEC — SIGNIFICANT CHANGE UP (ref 27.5–35.5)
APTT BLD: 34 SEC — SIGNIFICANT CHANGE UP (ref 27.5–35.5)
APTT BLD: 34.8 SEC — SIGNIFICANT CHANGE UP (ref 27.5–35.5)
APTT BLD: 35 SEC — SIGNIFICANT CHANGE UP (ref 27.5–35.5)
APTT BLD: 37.2 SEC — HIGH (ref 27.5–35.5)
APTT BLD: 38.8 SEC — HIGH (ref 27.5–35.5)
APTT BLD: 38.9 SEC — HIGH (ref 27.5–35.5)
APTT BLD: 39.3 SEC — HIGH (ref 27.5–35.5)
APTT BLD: 40.4 SEC — HIGH (ref 27.5–35.5)
APTT BLD: 41.9 SEC — HIGH (ref 27.5–35.5)
APTT BLD: 42.5 SEC — HIGH (ref 27.5–35.5)
APTT BLD: 50.7 SEC — HIGH (ref 27.5–35.5)
APTT BLD: 51.1 SEC — HIGH (ref 27.5–35.5)
APTT BLD: 54.5 SEC — HIGH (ref 27.5–35.5)
APTT BLD: 54.7 SEC — HIGH (ref 27.5–35.5)
APTT BLD: 56.4 SEC — HIGH (ref 27.5–35.5)
APTT BLD: 60.6 SEC — HIGH (ref 27.5–35.5)
APTT BLD: 60.6 SEC — HIGH (ref 27.5–35.5)
APTT BLD: 61.1 SEC — HIGH (ref 27.5–35.5)
APTT BLD: 63.7 SEC — HIGH (ref 27.5–35.5)
APTT BLD: 65.9 SEC — HIGH (ref 27.5–35.5)
APTT BLD: 66.6 SEC — HIGH (ref 27.5–35.5)
APTT BLD: 67 SEC — HIGH (ref 27.5–35.5)
APTT BLD: 67.3 SEC — HIGH (ref 27.5–35.5)
APTT BLD: 71.3 SEC — HIGH (ref 27.5–35.5)
APTT BLD: 72.7 SEC — HIGH (ref 27.5–35.5)
APTT BLD: 76.7 SEC — HIGH (ref 27.5–35.5)
APTT BLD: 86.8 SEC — HIGH (ref 27.5–35.5)
APTT BLD: 87.7 SEC — HIGH (ref 27.5–35.5)
APTT BLD: 88.5 SEC — HIGH (ref 27.5–35.5)
APTT BLD: 88.7 SEC — HIGH (ref 27.5–35.5)
APTT BLD: 90.1 SEC — HIGH (ref 27.5–35.5)
APTT BLD: 91.1 SEC — HIGH (ref 27.5–35.5)
APTT BLD: 94 SEC — HIGH (ref 27.5–35.5)
APTT BLD: 96.2 SEC — HIGH (ref 27.5–35.5)
APTT BLD: >200 SEC — CRITICAL HIGH (ref 27.5–35.5)
AST SERPL-CCNC: 105 U/L — HIGH
AST SERPL-CCNC: 20 U/L — SIGNIFICANT CHANGE UP
AST SERPL-CCNC: 209 U/L — HIGH
AST SERPL-CCNC: 25 U/L — SIGNIFICANT CHANGE UP
AST SERPL-CCNC: 27 U/L — SIGNIFICANT CHANGE UP
AST SERPL-CCNC: 28 U/L — SIGNIFICANT CHANGE UP
AST SERPL-CCNC: 29 U/L — SIGNIFICANT CHANGE UP
AST SERPL-CCNC: 30 U/L — SIGNIFICANT CHANGE UP
AST SERPL-CCNC: 30 U/L — SIGNIFICANT CHANGE UP
AST SERPL-CCNC: 304 U/L — HIGH
AST SERPL-CCNC: 31 U/L — SIGNIFICANT CHANGE UP
AST SERPL-CCNC: 32 U/L — SIGNIFICANT CHANGE UP
AST SERPL-CCNC: 33 U/L — SIGNIFICANT CHANGE UP
AST SERPL-CCNC: 36 U/L — SIGNIFICANT CHANGE UP
AST SERPL-CCNC: 463 U/L — HIGH
AST SERPL-CCNC: 61 U/L — HIGH
B BURGDOR AB SER QL IA: POSITIVE — SIGNIFICANT CHANGE UP
B MICROTI IGG TITR SER: SIGNIFICANT CHANGE UP
B MICROTI IGG TITR SER: SIGNIFICANT CHANGE UP TITER
B MICROTI IGM TITR SER: SIGNIFICANT CHANGE UP
B PERT IGG+IGM PNL SER: CLEAR — SIGNIFICANT CHANGE UP
B19V IGG SER-ACNC: 9 INDEX — HIGH (ref 0–0.9)
B19V IGG+IGM SER-IMP: POSITIVE
B19V IGG+IGM SER-IMP: SIGNIFICANT CHANGE UP
B19V IGM FLD-ACNC: 0.3 INDEX — SIGNIFICANT CHANGE UP (ref 0–0.9)
B19V IGM SER-ACNC: NEGATIVE — SIGNIFICANT CHANGE UP
BACTERIA # UR AUTO: ABNORMAL
BACTERIA # UR AUTO: ABNORMAL
BASE EXCESS BLDA CALC-SCNC: 9.6 MMOL/L — HIGH (ref -2–3)
BASOPHILS # BLD AUTO: 0 K/UL — SIGNIFICANT CHANGE UP (ref 0–0.2)
BASOPHILS # BLD AUTO: 0.03 K/UL — SIGNIFICANT CHANGE UP (ref 0–0.2)
BASOPHILS # BLD AUTO: 0.03 K/UL — SIGNIFICANT CHANGE UP (ref 0–0.2)
BASOPHILS # BLD AUTO: 0.05 K/UL — SIGNIFICANT CHANGE UP (ref 0–0.2)
BASOPHILS # BLD AUTO: 0.06 K/UL — SIGNIFICANT CHANGE UP (ref 0–0.2)
BASOPHILS # BLD AUTO: 0.07 K/UL — SIGNIFICANT CHANGE UP (ref 0–0.2)
BASOPHILS # BLD AUTO: 0.08 K/UL — SIGNIFICANT CHANGE UP (ref 0–0.2)
BASOPHILS # BLD AUTO: 0.09 K/UL — SIGNIFICANT CHANGE UP (ref 0–0.2)
BASOPHILS # BLD AUTO: 0.1 K/UL — SIGNIFICANT CHANGE UP (ref 0–0.2)
BASOPHILS # BLD AUTO: 0.11 K/UL — SIGNIFICANT CHANGE UP (ref 0–0.2)
BASOPHILS # BLD AUTO: 0.11 K/UL — SIGNIFICANT CHANGE UP (ref 0–0.2)
BASOPHILS # BLD AUTO: 0.12 K/UL — SIGNIFICANT CHANGE UP (ref 0–0.2)
BASOPHILS # BLD AUTO: 0.13 K/UL — SIGNIFICANT CHANGE UP (ref 0–0.2)
BASOPHILS # BLD AUTO: 0.13 K/UL — SIGNIFICANT CHANGE UP (ref 0–0.2)
BASOPHILS # BLD AUTO: 0.14 K/UL — SIGNIFICANT CHANGE UP (ref 0–0.2)
BASOPHILS # BLD AUTO: 0.14 K/UL — SIGNIFICANT CHANGE UP (ref 0–0.2)
BASOPHILS # BLD AUTO: 0.21 K/UL — HIGH (ref 0–0.2)
BASOPHILS NFR BLD AUTO: 0 % — SIGNIFICANT CHANGE UP (ref 0–2)
BASOPHILS NFR BLD AUTO: 0.2 % — SIGNIFICANT CHANGE UP (ref 0–2)
BASOPHILS NFR BLD AUTO: 0.4 % — SIGNIFICANT CHANGE UP (ref 0–2)
BASOPHILS NFR BLD AUTO: 0.6 % — SIGNIFICANT CHANGE UP (ref 0–2)
BASOPHILS NFR BLD AUTO: 0.7 % — SIGNIFICANT CHANGE UP (ref 0–2)
BASOPHILS NFR BLD AUTO: 0.7 % — SIGNIFICANT CHANGE UP (ref 0–2)
BASOPHILS NFR BLD AUTO: 0.8 % — SIGNIFICANT CHANGE UP (ref 0–2)
BASOPHILS NFR BLD AUTO: 0.9 % — SIGNIFICANT CHANGE UP (ref 0–2)
BASOPHILS NFR BLD AUTO: 1 % — SIGNIFICANT CHANGE UP (ref 0–2)
BASOPHILS NFR BLD AUTO: 1.1 % — SIGNIFICANT CHANGE UP (ref 0–2)
BASOPHILS NFR BLD AUTO: 1.1 % — SIGNIFICANT CHANGE UP (ref 0–2)
BASOPHILS NFR BLD AUTO: 1.2 % — SIGNIFICANT CHANGE UP (ref 0–2)
BASOPHILS NFR BLD AUTO: 1.2 % — SIGNIFICANT CHANGE UP (ref 0–2)
BASOPHILS NFR BLD AUTO: 1.3 % — SIGNIFICANT CHANGE UP (ref 0–2)
BASOPHILS NFR BLD AUTO: 1.5 % — SIGNIFICANT CHANGE UP (ref 0–2)
BASOPHILS NFR BLD AUTO: 1.6 % — SIGNIFICANT CHANGE UP (ref 0–2)
BASOPHILS NFR BLD AUTO: 1.6 % — SIGNIFICANT CHANGE UP (ref 0–2)
BASOPHILS NFR BLD AUTO: 1.7 % — SIGNIFICANT CHANGE UP (ref 0–2)
BASOPHILS NFR BLD AUTO: 2 % — SIGNIFICANT CHANGE UP (ref 0–2)
BASOPHILS NFR BLD AUTO: 2.1 % — HIGH (ref 0–2)
BASOPHILS NFR BLD AUTO: 2.6 % — HIGH (ref 0–2)
BASOPHILS NFR BLD AUTO: 2.7 % — HIGH (ref 0–2)
BILIRUB DIRECT SERPL-MCNC: 0.4 MG/DL — HIGH (ref 0–0.3)
BILIRUB INDIRECT FLD-MCNC: 0.4 MG/DL — SIGNIFICANT CHANGE UP (ref 0.2–1)
BILIRUB SERPL-MCNC: 0.4 MG/DL — SIGNIFICANT CHANGE UP (ref 0.4–2)
BILIRUB SERPL-MCNC: 0.5 MG/DL — SIGNIFICANT CHANGE UP (ref 0.4–2)
BILIRUB SERPL-MCNC: 0.6 MG/DL — SIGNIFICANT CHANGE UP (ref 0.4–2)
BILIRUB SERPL-MCNC: 0.7 MG/DL — SIGNIFICANT CHANGE UP (ref 0.4–2)
BILIRUB SERPL-MCNC: 0.7 MG/DL — SIGNIFICANT CHANGE UP (ref 0.4–2)
BILIRUB SERPL-MCNC: 0.8 MG/DL — SIGNIFICANT CHANGE UP (ref 0.4–2)
BILIRUB SERPL-MCNC: 0.9 MG/DL — SIGNIFICANT CHANGE UP (ref 0.4–2)
BILIRUB SERPL-MCNC: 0.9 MG/DL — SIGNIFICANT CHANGE UP (ref 0.4–2)
BILIRUB UR-MCNC: ABNORMAL
BILIRUB UR-MCNC: NEGATIVE — SIGNIFICANT CHANGE UP
BILIRUB UR-MCNC: NEGATIVE — SIGNIFICANT CHANGE UP
BLD GP AB SCN SERPL QL: SIGNIFICANT CHANGE UP
BLOOD GAS COMMENTS ARTERIAL: SIGNIFICANT CHANGE UP
BUN SERPL-MCNC: 18.9 MG/DL — SIGNIFICANT CHANGE UP (ref 8–20)
BUN SERPL-MCNC: 22.8 MG/DL — HIGH (ref 8–20)
BUN SERPL-MCNC: 23.4 MG/DL — HIGH (ref 8–20)
BUN SERPL-MCNC: 23.6 MG/DL — HIGH (ref 8–20)
BUN SERPL-MCNC: 24.7 MG/DL — HIGH (ref 8–20)
BUN SERPL-MCNC: 25.2 MG/DL — HIGH (ref 8–20)
BUN SERPL-MCNC: 26.4 MG/DL — HIGH (ref 8–20)
BUN SERPL-MCNC: 28.1 MG/DL — HIGH (ref 8–20)
BUN SERPL-MCNC: 28.1 MG/DL — HIGH (ref 8–20)
BUN SERPL-MCNC: 28.5 MG/DL — HIGH (ref 8–20)
BUN SERPL-MCNC: 28.6 MG/DL — HIGH (ref 8–20)
BUN SERPL-MCNC: 29.2 MG/DL — HIGH (ref 8–20)
BUN SERPL-MCNC: 30.5 MG/DL — HIGH (ref 8–20)
BUN SERPL-MCNC: 30.6 MG/DL — HIGH (ref 8–20)
BUN SERPL-MCNC: 31.8 MG/DL — HIGH (ref 8–20)
BUN SERPL-MCNC: 32.3 MG/DL — HIGH (ref 8–20)
BUN SERPL-MCNC: 34.7 MG/DL — HIGH (ref 8–20)
BUN SERPL-MCNC: 35.4 MG/DL — HIGH (ref 8–20)
BUN SERPL-MCNC: 35.6 MG/DL — HIGH (ref 8–20)
BUN SERPL-MCNC: 36 MG/DL — HIGH (ref 8–20)
BUN SERPL-MCNC: 36 MG/DL — HIGH (ref 8–20)
BUN SERPL-MCNC: 36.3 MG/DL — HIGH (ref 8–20)
BUN SERPL-MCNC: 36.5 MG/DL — HIGH (ref 8–20)
BUN SERPL-MCNC: 36.5 MG/DL — HIGH (ref 8–20)
BUN SERPL-MCNC: 36.6 MG/DL — HIGH (ref 8–20)
BUN SERPL-MCNC: 37.5 MG/DL — HIGH (ref 8–20)
BUN SERPL-MCNC: 38.5 MG/DL — HIGH (ref 8–20)
BUN SERPL-MCNC: 38.9 MG/DL — HIGH (ref 8–20)
BUN SERPL-MCNC: 39.6 MG/DL — HIGH (ref 8–20)
BUN SERPL-MCNC: 40.8 MG/DL — HIGH (ref 8–20)
BUN SERPL-MCNC: 41.4 MG/DL — HIGH (ref 8–20)
BUN SERPL-MCNC: 42.1 MG/DL — HIGH (ref 8–20)
BUN SERPL-MCNC: 42.4 MG/DL — HIGH (ref 8–20)
BUN SERPL-MCNC: 43.3 MG/DL — HIGH (ref 8–20)
BUN SERPL-MCNC: 44.2 MG/DL — HIGH (ref 8–20)
BUN SERPL-MCNC: 44.5 MG/DL — HIGH (ref 8–20)
BUN SERPL-MCNC: 44.9 MG/DL — HIGH (ref 8–20)
BUN SERPL-MCNC: 45.8 MG/DL — HIGH (ref 8–20)
BUN SERPL-MCNC: 46.4 MG/DL — HIGH (ref 8–20)
BUN SERPL-MCNC: 46.6 MG/DL — HIGH (ref 8–20)
BUN SERPL-MCNC: 49.1 MG/DL — HIGH (ref 8–20)
BUN SERPL-MCNC: 50.5 MG/DL — HIGH (ref 8–20)
BUN SERPL-MCNC: 50.8 MG/DL — HIGH (ref 8–20)
BUN SERPL-MCNC: 51.3 MG/DL — HIGH (ref 8–20)
BUN SERPL-MCNC: 52.5 MG/DL — HIGH (ref 8–20)
BUN SERPL-MCNC: 52.7 MG/DL — HIGH (ref 8–20)
BUN SERPL-MCNC: 52.8 MG/DL — HIGH (ref 8–20)
BUN SERPL-MCNC: 53.4 MG/DL — HIGH (ref 8–20)
BUN SERPL-MCNC: 54.5 MG/DL — HIGH (ref 8–20)
BUN SERPL-MCNC: 55.6 MG/DL — HIGH (ref 8–20)
BUN SERPL-MCNC: 56.4 MG/DL — HIGH (ref 8–20)
BUN SERPL-MCNC: 57.7 MG/DL — HIGH (ref 8–20)
BUN SERPL-MCNC: 57.8 MG/DL — HIGH (ref 8–20)
BUN SERPL-MCNC: 58.9 MG/DL — HIGH (ref 8–20)
BUN SERPL-MCNC: 61.7 MG/DL — HIGH (ref 8–20)
BUN SERPL-MCNC: 63.4 MG/DL — HIGH (ref 8–20)
BUN SERPL-MCNC: 64.5 MG/DL — HIGH (ref 8–20)
BUN SERPL-MCNC: 72.6 MG/DL — HIGH (ref 8–20)
BUN SERPL-MCNC: 72.7 MG/DL — HIGH (ref 8–20)
BUN SERPL-MCNC: 73.2 MG/DL — HIGH (ref 8–20)
BUN SERPL-MCNC: 74.5 MG/DL — HIGH (ref 8–20)
BURR CELLS BLD QL SMEAR: PRESENT — SIGNIFICANT CHANGE UP
CALCIUM SERPL-MCNC: 10 MG/DL — SIGNIFICANT CHANGE UP (ref 8.6–10.2)
CALCIUM SERPL-MCNC: 10.1 MG/DL — SIGNIFICANT CHANGE UP (ref 8.6–10.2)
CALCIUM SERPL-MCNC: 10.2 MG/DL — SIGNIFICANT CHANGE UP (ref 8.6–10.2)
CALCIUM SERPL-MCNC: 10.3 MG/DL — HIGH (ref 8.6–10.2)
CALCIUM SERPL-MCNC: 7.5 MG/DL — LOW (ref 8.6–10.2)
CALCIUM SERPL-MCNC: 7.8 MG/DL — LOW (ref 8.6–10.2)
CALCIUM SERPL-MCNC: 7.9 MG/DL — LOW (ref 8.6–10.2)
CALCIUM SERPL-MCNC: 8.1 MG/DL — LOW (ref 8.6–10.2)
CALCIUM SERPL-MCNC: 8.3 MG/DL — LOW (ref 8.6–10.2)
CALCIUM SERPL-MCNC: 8.4 MG/DL — LOW (ref 8.6–10.2)
CALCIUM SERPL-MCNC: 8.5 MG/DL — LOW (ref 8.6–10.2)
CALCIUM SERPL-MCNC: 8.6 MG/DL — SIGNIFICANT CHANGE UP (ref 8.6–10.2)
CALCIUM SERPL-MCNC: 8.7 MG/DL — SIGNIFICANT CHANGE UP (ref 8.6–10.2)
CALCIUM SERPL-MCNC: 8.8 MG/DL — SIGNIFICANT CHANGE UP (ref 8.6–10.2)
CALCIUM SERPL-MCNC: 8.9 MG/DL — SIGNIFICANT CHANGE UP (ref 8.6–10.2)
CALCIUM SERPL-MCNC: 9 MG/DL — SIGNIFICANT CHANGE UP (ref 8.6–10.2)
CALCIUM SERPL-MCNC: 9.1 MG/DL — SIGNIFICANT CHANGE UP (ref 8.6–10.2)
CALCIUM SERPL-MCNC: 9.1 MG/DL — SIGNIFICANT CHANGE UP (ref 8.6–10.2)
CALCIUM SERPL-MCNC: 9.2 MG/DL — SIGNIFICANT CHANGE UP (ref 8.6–10.2)
CALCIUM SERPL-MCNC: 9.3 MG/DL — SIGNIFICANT CHANGE UP (ref 8.6–10.2)
CALCIUM SERPL-MCNC: 9.5 MG/DL — SIGNIFICANT CHANGE UP (ref 8.6–10.2)
CALCIUM SERPL-MCNC: 9.6 MG/DL — SIGNIFICANT CHANGE UP (ref 8.6–10.2)
CALCIUM SERPL-MCNC: 9.6 MG/DL — SIGNIFICANT CHANGE UP (ref 8.6–10.2)
CALCIUM SERPL-MCNC: 9.7 MG/DL — SIGNIFICANT CHANGE UP (ref 8.6–10.2)
CALCIUM SERPL-MCNC: 9.8 MG/DL — SIGNIFICANT CHANGE UP (ref 8.6–10.2)
CHLORIDE SERPL-SCNC: 100 MMOL/L — SIGNIFICANT CHANGE UP (ref 98–107)
CHLORIDE SERPL-SCNC: 100 MMOL/L — SIGNIFICANT CHANGE UP (ref 98–107)
CHLORIDE SERPL-SCNC: 101 MMOL/L — SIGNIFICANT CHANGE UP (ref 98–107)
CHLORIDE SERPL-SCNC: 102 MMOL/L — SIGNIFICANT CHANGE UP (ref 98–107)
CHLORIDE SERPL-SCNC: 85 MMOL/L — LOW (ref 98–107)
CHLORIDE SERPL-SCNC: 92 MMOL/L — LOW (ref 98–107)
CHLORIDE SERPL-SCNC: 93 MMOL/L — LOW (ref 98–107)
CHLORIDE SERPL-SCNC: 94 MMOL/L — LOW (ref 98–107)
CHLORIDE SERPL-SCNC: 95 MMOL/L — LOW (ref 98–107)
CHLORIDE SERPL-SCNC: 96 MMOL/L — LOW (ref 98–107)
CHLORIDE SERPL-SCNC: 97 MMOL/L — LOW (ref 98–107)
CHLORIDE SERPL-SCNC: 98 MMOL/L — SIGNIFICANT CHANGE UP (ref 98–107)
CHLORIDE SERPL-SCNC: 99 MMOL/L — SIGNIFICANT CHANGE UP (ref 98–107)
CK MB CFR SERPL CALC: 6.3 NG/ML — SIGNIFICANT CHANGE UP (ref 0–6.7)
CK MB CFR SERPL CALC: 7.4 NG/ML — HIGH (ref 0–6.7)
CK SERPL-CCNC: 109 U/L — SIGNIFICANT CHANGE UP (ref 30–200)
CK SERPL-CCNC: 168 U/L — SIGNIFICANT CHANGE UP (ref 30–200)
CK SERPL-CCNC: 207 U/L — HIGH (ref 30–200)
CK SERPL-CCNC: 99 U/L — SIGNIFICANT CHANGE UP (ref 30–200)
CMV IGG FLD QL: <0.2 U/ML — SIGNIFICANT CHANGE UP
CMV IGG SERPL-IMP: NEGATIVE — SIGNIFICANT CHANGE UP
CMV IGM FLD-ACNC: <8 AU/ML — SIGNIFICANT CHANGE UP
CMV IGM SERPL QL: NEGATIVE — SIGNIFICANT CHANGE UP
CO2 SERPL-SCNC: 20 MMOL/L — LOW (ref 22–29)
CO2 SERPL-SCNC: 21 MMOL/L — LOW (ref 22–29)
CO2 SERPL-SCNC: 22 MMOL/L — SIGNIFICANT CHANGE UP (ref 22–29)
CO2 SERPL-SCNC: 22 MMOL/L — SIGNIFICANT CHANGE UP (ref 22–29)
CO2 SERPL-SCNC: 23 MMOL/L — SIGNIFICANT CHANGE UP (ref 22–29)
CO2 SERPL-SCNC: 24 MMOL/L — SIGNIFICANT CHANGE UP (ref 22–29)
CO2 SERPL-SCNC: 25 MMOL/L — SIGNIFICANT CHANGE UP (ref 22–29)
CO2 SERPL-SCNC: 26 MMOL/L — SIGNIFICANT CHANGE UP (ref 22–29)
CO2 SERPL-SCNC: 27 MMOL/L — SIGNIFICANT CHANGE UP (ref 22–29)
CO2 SERPL-SCNC: 28 MMOL/L — SIGNIFICANT CHANGE UP (ref 22–29)
COLOR FLD: YELLOW
COLOR SPEC: YELLOW — SIGNIFICANT CHANGE UP
CREAT SERPL-MCNC: 2.51 MG/DL — HIGH (ref 0.5–1.3)
CREAT SERPL-MCNC: 2.7 MG/DL — HIGH (ref 0.5–1.3)
CREAT SERPL-MCNC: 2.71 MG/DL — HIGH (ref 0.5–1.3)
CREAT SERPL-MCNC: 2.75 MG/DL — HIGH (ref 0.5–1.3)
CREAT SERPL-MCNC: 2.86 MG/DL — HIGH (ref 0.5–1.3)
CREAT SERPL-MCNC: 2.91 MG/DL — HIGH (ref 0.5–1.3)
CREAT SERPL-MCNC: 2.92 MG/DL — HIGH (ref 0.5–1.3)
CREAT SERPL-MCNC: 3.04 MG/DL — HIGH (ref 0.5–1.3)
CREAT SERPL-MCNC: 3.07 MG/DL — HIGH (ref 0.5–1.3)
CREAT SERPL-MCNC: 3.1 MG/DL — HIGH (ref 0.5–1.3)
CREAT SERPL-MCNC: 3.1 MG/DL — HIGH (ref 0.5–1.3)
CREAT SERPL-MCNC: 3.14 MG/DL — HIGH (ref 0.5–1.3)
CREAT SERPL-MCNC: 3.16 MG/DL — HIGH (ref 0.5–1.3)
CREAT SERPL-MCNC: 3.2 MG/DL — HIGH (ref 0.5–1.3)
CREAT SERPL-MCNC: 3.21 MG/DL — HIGH (ref 0.5–1.3)
CREAT SERPL-MCNC: 3.21 MG/DL — HIGH (ref 0.5–1.3)
CREAT SERPL-MCNC: 3.22 MG/DL — HIGH (ref 0.5–1.3)
CREAT SERPL-MCNC: 3.23 MG/DL — HIGH (ref 0.5–1.3)
CREAT SERPL-MCNC: 3.28 MG/DL — HIGH (ref 0.5–1.3)
CREAT SERPL-MCNC: 3.35 MG/DL — HIGH (ref 0.5–1.3)
CREAT SERPL-MCNC: 3.37 MG/DL — HIGH (ref 0.5–1.3)
CREAT SERPL-MCNC: 3.41 MG/DL — HIGH (ref 0.5–1.3)
CREAT SERPL-MCNC: 3.42 MG/DL — HIGH (ref 0.5–1.3)
CREAT SERPL-MCNC: 3.44 MG/DL — HIGH (ref 0.5–1.3)
CREAT SERPL-MCNC: 3.47 MG/DL — HIGH (ref 0.5–1.3)
CREAT SERPL-MCNC: 3.55 MG/DL — HIGH (ref 0.5–1.3)
CREAT SERPL-MCNC: 3.56 MG/DL — HIGH (ref 0.5–1.3)
CREAT SERPL-MCNC: 3.74 MG/DL — HIGH (ref 0.5–1.3)
CREAT SERPL-MCNC: 3.76 MG/DL — HIGH (ref 0.5–1.3)
CREAT SERPL-MCNC: 3.84 MG/DL — HIGH (ref 0.5–1.3)
CREAT SERPL-MCNC: 3.86 MG/DL — HIGH (ref 0.5–1.3)
CREAT SERPL-MCNC: 3.93 MG/DL — HIGH (ref 0.5–1.3)
CREAT SERPL-MCNC: 3.95 MG/DL — HIGH (ref 0.5–1.3)
CREAT SERPL-MCNC: 3.97 MG/DL — HIGH (ref 0.5–1.3)
CREAT SERPL-MCNC: 3.98 MG/DL — HIGH (ref 0.5–1.3)
CREAT SERPL-MCNC: 4 MG/DL — HIGH (ref 0.5–1.3)
CREAT SERPL-MCNC: 4.13 MG/DL — HIGH (ref 0.5–1.3)
CREAT SERPL-MCNC: 4.15 MG/DL — HIGH (ref 0.5–1.3)
CREAT SERPL-MCNC: 4.22 MG/DL — HIGH (ref 0.5–1.3)
CREAT SERPL-MCNC: 4.23 MG/DL — HIGH (ref 0.5–1.3)
CREAT SERPL-MCNC: 4.23 MG/DL — HIGH (ref 0.5–1.3)
CREAT SERPL-MCNC: 4.26 MG/DL — HIGH (ref 0.5–1.3)
CREAT SERPL-MCNC: 4.32 MG/DL — HIGH (ref 0.5–1.3)
CREAT SERPL-MCNC: 4.41 MG/DL — HIGH (ref 0.5–1.3)
CREAT SERPL-MCNC: 4.41 MG/DL — HIGH (ref 0.5–1.3)
CREAT SERPL-MCNC: 4.47 MG/DL — HIGH (ref 0.5–1.3)
CREAT SERPL-MCNC: 4.52 MG/DL — HIGH (ref 0.5–1.3)
CREAT SERPL-MCNC: 4.53 MG/DL — HIGH (ref 0.5–1.3)
CREAT SERPL-MCNC: 4.55 MG/DL — HIGH (ref 0.5–1.3)
CREAT SERPL-MCNC: 4.55 MG/DL — HIGH (ref 0.5–1.3)
CREAT SERPL-MCNC: 4.67 MG/DL — HIGH (ref 0.5–1.3)
CREAT SERPL-MCNC: 4.76 MG/DL — HIGH (ref 0.5–1.3)
CREAT SERPL-MCNC: 4.9 MG/DL — HIGH (ref 0.5–1.3)
CREAT SERPL-MCNC: 5 MG/DL — HIGH (ref 0.5–1.3)
CREAT SERPL-MCNC: 5.09 MG/DL — HIGH (ref 0.5–1.3)
CREAT SERPL-MCNC: 5.09 MG/DL — HIGH (ref 0.5–1.3)
CREAT SERPL-MCNC: 5.27 MG/DL — HIGH (ref 0.5–1.3)
CREAT SERPL-MCNC: 5.38 MG/DL — HIGH (ref 0.5–1.3)
CREAT SERPL-MCNC: 5.39 MG/DL — HIGH (ref 0.5–1.3)
CREAT SERPL-MCNC: 5.51 MG/DL — HIGH (ref 0.5–1.3)
CREAT SERPL-MCNC: 5.66 MG/DL — HIGH (ref 0.5–1.3)
CULTURE RESULTS: SIGNIFICANT CHANGE UP
DIFF PNL FLD: ABNORMAL
DIFF PNL FLD: ABNORMAL
DIFF PNL FLD: NEGATIVE — SIGNIFICANT CHANGE UP
E CHAFFEENSIS IGG TITR SER IF: SIGNIFICANT CHANGE UP TITER
EBV EA AB SER IA-ACNC: <5 U/ML — SIGNIFICANT CHANGE UP
EBV EA AB TITR SER IF: POSITIVE
EBV EA IGG SER-ACNC: NEGATIVE — SIGNIFICANT CHANGE UP
EBV NA IGG SER IA-ACNC: >600 U/ML — HIGH
EBV PATRN SPEC IB-IMP: SIGNIFICANT CHANGE UP
EBV VCA IGG AVIDITY SER QL IA: POSITIVE
EBV VCA IGM SER IA-ACNC: <10 U/ML — SIGNIFICANT CHANGE UP
EBV VCA IGM SER IA-ACNC: >750 U/ML — HIGH
EBV VCA IGM TITR FLD: NEGATIVE — SIGNIFICANT CHANGE UP
EGFR: 10 ML/MIN/1.73M2 — LOW
EGFR: 11 ML/MIN/1.73M2 — LOW
EGFR: 12 ML/MIN/1.73M2 — LOW
EGFR: 13 ML/MIN/1.73M2 — LOW
EGFR: 14 ML/MIN/1.73M2 — LOW
EGFR: 15 ML/MIN/1.73M2 — LOW
EGFR: 15 ML/MIN/1.73M2 — LOW
EGFR: 16 ML/MIN/1.73M2 — LOW
EGFR: 17 ML/MIN/1.73M2 — LOW
EGFR: 18 ML/MIN/1.73M2 — LOW
EGFR: 19 ML/MIN/1.73M2 — LOW
EGFR: 20 ML/MIN/1.73M2 — LOW
EGFR: 20 ML/MIN/1.73M2 — LOW
EGFR: 21 ML/MIN/1.73M2 — LOW
EGFR: 22 ML/MIN/1.73M2 — LOW
EGFR: 24 ML/MIN/1.73M2 — LOW
EGFR: 9 ML/MIN/1.73M2 — LOW
EGFR: 9 ML/MIN/1.73M2 — LOW
ELLIPTOCYTES BLD QL SMEAR: SLIGHT — SIGNIFICANT CHANGE UP
EOSINOPHIL # BLD AUTO: 0 K/UL — SIGNIFICANT CHANGE UP (ref 0–0.5)
EOSINOPHIL # BLD AUTO: 0.01 K/UL — SIGNIFICANT CHANGE UP (ref 0–0.5)
EOSINOPHIL # BLD AUTO: 0.02 K/UL — SIGNIFICANT CHANGE UP (ref 0–0.5)
EOSINOPHIL # BLD AUTO: 0.03 K/UL — SIGNIFICANT CHANGE UP (ref 0–0.5)
EOSINOPHIL # BLD AUTO: 0.03 K/UL — SIGNIFICANT CHANGE UP (ref 0–0.5)
EOSINOPHIL # BLD AUTO: 0.04 K/UL — SIGNIFICANT CHANGE UP (ref 0–0.5)
EOSINOPHIL # BLD AUTO: 0.05 K/UL — SIGNIFICANT CHANGE UP (ref 0–0.5)
EOSINOPHIL # BLD AUTO: 0.06 K/UL — SIGNIFICANT CHANGE UP (ref 0–0.5)
EOSINOPHIL # BLD AUTO: 0.07 K/UL — SIGNIFICANT CHANGE UP (ref 0–0.5)
EOSINOPHIL # BLD AUTO: 0.08 K/UL — SIGNIFICANT CHANGE UP (ref 0–0.5)
EOSINOPHIL # BLD AUTO: 0.09 K/UL — SIGNIFICANT CHANGE UP (ref 0–0.5)
EOSINOPHIL # BLD AUTO: 0.1 K/UL — SIGNIFICANT CHANGE UP (ref 0–0.5)
EOSINOPHIL # BLD AUTO: 0.12 K/UL — SIGNIFICANT CHANGE UP (ref 0–0.5)
EOSINOPHIL # BLD AUTO: 0.14 K/UL — SIGNIFICANT CHANGE UP (ref 0–0.5)
EOSINOPHIL # BLD AUTO: 0.14 K/UL — SIGNIFICANT CHANGE UP (ref 0–0.5)
EOSINOPHIL # BLD AUTO: 0.15 K/UL — SIGNIFICANT CHANGE UP (ref 0–0.5)
EOSINOPHIL # BLD AUTO: 0.17 K/UL — SIGNIFICANT CHANGE UP (ref 0–0.5)
EOSINOPHIL # BLD AUTO: 0.21 K/UL — SIGNIFICANT CHANGE UP (ref 0–0.5)
EOSINOPHIL # BLD AUTO: 0.21 K/UL — SIGNIFICANT CHANGE UP (ref 0–0.5)
EOSINOPHIL # BLD AUTO: 0.26 K/UL — SIGNIFICANT CHANGE UP (ref 0–0.5)
EOSINOPHIL # BLD AUTO: 0.34 K/UL — SIGNIFICANT CHANGE UP (ref 0–0.5)
EOSINOPHIL # BLD AUTO: 0.35 K/UL — SIGNIFICANT CHANGE UP (ref 0–0.5)
EOSINOPHIL NFR BLD AUTO: 0 % — SIGNIFICANT CHANGE UP (ref 0–6)
EOSINOPHIL NFR BLD AUTO: 0.1 % — SIGNIFICANT CHANGE UP (ref 0–6)
EOSINOPHIL NFR BLD AUTO: 0.3 % — SIGNIFICANT CHANGE UP (ref 0–6)
EOSINOPHIL NFR BLD AUTO: 0.4 % — SIGNIFICANT CHANGE UP (ref 0–6)
EOSINOPHIL NFR BLD AUTO: 0.5 % — SIGNIFICANT CHANGE UP (ref 0–6)
EOSINOPHIL NFR BLD AUTO: 0.5 % — SIGNIFICANT CHANGE UP (ref 0–6)
EOSINOPHIL NFR BLD AUTO: 0.6 % — SIGNIFICANT CHANGE UP (ref 0–6)
EOSINOPHIL NFR BLD AUTO: 0.6 % — SIGNIFICANT CHANGE UP (ref 0–6)
EOSINOPHIL NFR BLD AUTO: 0.7 % — SIGNIFICANT CHANGE UP (ref 0–6)
EOSINOPHIL NFR BLD AUTO: 0.7 % — SIGNIFICANT CHANGE UP (ref 0–6)
EOSINOPHIL NFR BLD AUTO: 0.8 % — SIGNIFICANT CHANGE UP (ref 0–6)
EOSINOPHIL NFR BLD AUTO: 0.8 % — SIGNIFICANT CHANGE UP (ref 0–6)
EOSINOPHIL NFR BLD AUTO: 0.9 % — SIGNIFICANT CHANGE UP (ref 0–6)
EOSINOPHIL NFR BLD AUTO: 1 % — SIGNIFICANT CHANGE UP (ref 0–6)
EOSINOPHIL NFR BLD AUTO: 1.1 % — SIGNIFICANT CHANGE UP (ref 0–6)
EOSINOPHIL NFR BLD AUTO: 1.1 % — SIGNIFICANT CHANGE UP (ref 0–6)
EOSINOPHIL NFR BLD AUTO: 1.3 % — SIGNIFICANT CHANGE UP (ref 0–6)
EOSINOPHIL NFR BLD AUTO: 1.4 % — SIGNIFICANT CHANGE UP (ref 0–6)
EOSINOPHIL NFR BLD AUTO: 1.7 % — SIGNIFICANT CHANGE UP (ref 0–6)
EOSINOPHIL NFR BLD AUTO: 1.7 % — SIGNIFICANT CHANGE UP (ref 0–6)
EOSINOPHIL NFR BLD AUTO: 1.8 % — SIGNIFICANT CHANGE UP (ref 0–6)
EOSINOPHIL NFR BLD AUTO: 1.9 % — SIGNIFICANT CHANGE UP (ref 0–6)
EOSINOPHIL NFR BLD AUTO: 2.2 % — SIGNIFICANT CHANGE UP (ref 0–6)
EOSINOPHIL NFR BLD AUTO: 2.2 % — SIGNIFICANT CHANGE UP (ref 0–6)
EOSINOPHIL NFR BLD AUTO: 2.6 % — SIGNIFICANT CHANGE UP (ref 0–6)
EOSINOPHIL NFR BLD AUTO: 2.6 % — SIGNIFICANT CHANGE UP (ref 0–6)
EOSINOPHIL NFR BLD AUTO: 2.7 % — SIGNIFICANT CHANGE UP (ref 0–6)
EOSINOPHIL NFR BLD AUTO: 2.7 % — SIGNIFICANT CHANGE UP (ref 0–6)
EOSINOPHIL NFR BLD AUTO: 3.2 % — SIGNIFICANT CHANGE UP (ref 0–6)
EOSINOPHIL NFR BLD AUTO: 3.9 % — SIGNIFICANT CHANGE UP (ref 0–6)
EOSINOPHIL NFR BLD AUTO: 4 % — SIGNIFICANT CHANGE UP (ref 0–6)
EPI CELLS # UR: SIGNIFICANT CHANGE UP
ERYTHROCYTE [SEDIMENTATION RATE] IN BLOOD: 55 MM/HR — HIGH (ref 0–20)
ESTIMATED AVERAGE GLUCOSE: 97 MG/DL — SIGNIFICANT CHANGE UP (ref 68–114)
FERRITIN SERPL-MCNC: 2637 NG/ML — HIGH (ref 30–400)
FLUAV AG NPH QL: SIGNIFICANT CHANGE UP
FLUAV AG NPH QL: SIGNIFICANT CHANGE UP
FLUBV AG NPH QL: SIGNIFICANT CHANGE UP
FLUBV AG NPH QL: SIGNIFICANT CHANGE UP
FLUID INTAKE SUBSTANCE CLASS: SIGNIFICANT CHANGE UP
GAS PNL BLDA: SIGNIFICANT CHANGE UP
GAS PNL BLDA: SIGNIFICANT CHANGE UP
GIANT PLATELETS BLD QL SMEAR: PRESENT — SIGNIFICANT CHANGE UP
GLUCOSE BLDC GLUCOMTR-MCNC: 103 MG/DL — HIGH (ref 70–99)
GLUCOSE BLDC GLUCOMTR-MCNC: 106 MG/DL — HIGH (ref 70–99)
GLUCOSE BLDC GLUCOMTR-MCNC: 109 MG/DL — HIGH (ref 70–99)
GLUCOSE BLDC GLUCOMTR-MCNC: 110 MG/DL — HIGH (ref 70–99)
GLUCOSE BLDC GLUCOMTR-MCNC: 112 MG/DL — HIGH (ref 70–99)
GLUCOSE BLDC GLUCOMTR-MCNC: 113 MG/DL — HIGH (ref 70–99)
GLUCOSE BLDC GLUCOMTR-MCNC: 119 MG/DL — HIGH (ref 70–99)
GLUCOSE BLDC GLUCOMTR-MCNC: 119 MG/DL — HIGH (ref 70–99)
GLUCOSE BLDC GLUCOMTR-MCNC: 127 MG/DL — HIGH (ref 70–99)
GLUCOSE BLDC GLUCOMTR-MCNC: 130 MG/DL — HIGH (ref 70–99)
GLUCOSE BLDC GLUCOMTR-MCNC: 132 MG/DL — HIGH (ref 70–99)
GLUCOSE BLDC GLUCOMTR-MCNC: 133 MG/DL — HIGH (ref 70–99)
GLUCOSE BLDC GLUCOMTR-MCNC: 133 MG/DL — HIGH (ref 70–99)
GLUCOSE BLDC GLUCOMTR-MCNC: 134 MG/DL — HIGH (ref 70–99)
GLUCOSE BLDC GLUCOMTR-MCNC: 135 MG/DL — HIGH (ref 70–99)
GLUCOSE BLDC GLUCOMTR-MCNC: 138 MG/DL — HIGH (ref 70–99)
GLUCOSE BLDC GLUCOMTR-MCNC: 141 MG/DL — HIGH (ref 70–99)
GLUCOSE BLDC GLUCOMTR-MCNC: 141 MG/DL — HIGH (ref 70–99)
GLUCOSE BLDC GLUCOMTR-MCNC: 144 MG/DL — HIGH (ref 70–99)
GLUCOSE BLDC GLUCOMTR-MCNC: 149 MG/DL — HIGH (ref 70–99)
GLUCOSE BLDC GLUCOMTR-MCNC: 150 MG/DL — HIGH (ref 70–99)
GLUCOSE BLDC GLUCOMTR-MCNC: 154 MG/DL — HIGH (ref 70–99)
GLUCOSE BLDC GLUCOMTR-MCNC: 161 MG/DL — HIGH (ref 70–99)
GLUCOSE BLDC GLUCOMTR-MCNC: 165 MG/DL — HIGH (ref 70–99)
GLUCOSE BLDC GLUCOMTR-MCNC: 168 MG/DL — HIGH (ref 70–99)
GLUCOSE BLDC GLUCOMTR-MCNC: 168 MG/DL — HIGH (ref 70–99)
GLUCOSE BLDC GLUCOMTR-MCNC: 169 MG/DL — HIGH (ref 70–99)
GLUCOSE BLDC GLUCOMTR-MCNC: 170 MG/DL — HIGH (ref 70–99)
GLUCOSE BLDC GLUCOMTR-MCNC: 183 MG/DL — HIGH (ref 70–99)
GLUCOSE BLDC GLUCOMTR-MCNC: 189 MG/DL — HIGH (ref 70–99)
GLUCOSE BLDC GLUCOMTR-MCNC: 197 MG/DL — HIGH (ref 70–99)
GLUCOSE BLDC GLUCOMTR-MCNC: 209 MG/DL — HIGH (ref 70–99)
GLUCOSE BLDC GLUCOMTR-MCNC: 215 MG/DL — HIGH (ref 70–99)
GLUCOSE BLDC GLUCOMTR-MCNC: 229 MG/DL — HIGH (ref 70–99)
GLUCOSE BLDC GLUCOMTR-MCNC: 260 MG/DL — HIGH (ref 70–99)
GLUCOSE BLDC GLUCOMTR-MCNC: 75 MG/DL — SIGNIFICANT CHANGE UP (ref 70–99)
GLUCOSE BLDC GLUCOMTR-MCNC: 92 MG/DL — SIGNIFICANT CHANGE UP (ref 70–99)
GLUCOSE BLDC GLUCOMTR-MCNC: 92 MG/DL — SIGNIFICANT CHANGE UP (ref 70–99)
GLUCOSE BLDC GLUCOMTR-MCNC: 93 MG/DL — SIGNIFICANT CHANGE UP (ref 70–99)
GLUCOSE BLDC GLUCOMTR-MCNC: 95 MG/DL — SIGNIFICANT CHANGE UP (ref 70–99)
GLUCOSE FLD-MCNC: 100 MG/DL — SIGNIFICANT CHANGE UP
GLUCOSE SERPL-MCNC: 100 MG/DL — HIGH (ref 70–99)
GLUCOSE SERPL-MCNC: 101 MG/DL — HIGH (ref 70–99)
GLUCOSE SERPL-MCNC: 101 MG/DL — HIGH (ref 70–99)
GLUCOSE SERPL-MCNC: 102 MG/DL — HIGH (ref 70–99)
GLUCOSE SERPL-MCNC: 103 MG/DL — HIGH (ref 70–99)
GLUCOSE SERPL-MCNC: 105 MG/DL — HIGH (ref 70–99)
GLUCOSE SERPL-MCNC: 107 MG/DL — HIGH (ref 70–99)
GLUCOSE SERPL-MCNC: 108 MG/DL — HIGH (ref 70–99)
GLUCOSE SERPL-MCNC: 109 MG/DL — HIGH (ref 70–99)
GLUCOSE SERPL-MCNC: 110 MG/DL — HIGH (ref 70–99)
GLUCOSE SERPL-MCNC: 111 MG/DL — HIGH (ref 70–99)
GLUCOSE SERPL-MCNC: 112 MG/DL — HIGH (ref 70–99)
GLUCOSE SERPL-MCNC: 113 MG/DL — HIGH (ref 70–99)
GLUCOSE SERPL-MCNC: 115 MG/DL — HIGH (ref 70–99)
GLUCOSE SERPL-MCNC: 124 MG/DL — HIGH (ref 70–99)
GLUCOSE SERPL-MCNC: 126 MG/DL — HIGH (ref 70–99)
GLUCOSE SERPL-MCNC: 126 MG/DL — HIGH (ref 70–99)
GLUCOSE SERPL-MCNC: 127 MG/DL — HIGH (ref 70–99)
GLUCOSE SERPL-MCNC: 127 MG/DL — HIGH (ref 70–99)
GLUCOSE SERPL-MCNC: 128 MG/DL — HIGH (ref 70–99)
GLUCOSE SERPL-MCNC: 130 MG/DL — HIGH (ref 70–99)
GLUCOSE SERPL-MCNC: 130 MG/DL — HIGH (ref 70–99)
GLUCOSE SERPL-MCNC: 131 MG/DL — HIGH (ref 70–99)
GLUCOSE SERPL-MCNC: 134 MG/DL — HIGH (ref 70–99)
GLUCOSE SERPL-MCNC: 136 MG/DL — HIGH (ref 70–99)
GLUCOSE SERPL-MCNC: 136 MG/DL — HIGH (ref 70–99)
GLUCOSE SERPL-MCNC: 142 MG/DL — HIGH (ref 70–99)
GLUCOSE SERPL-MCNC: 152 MG/DL — HIGH (ref 70–99)
GLUCOSE SERPL-MCNC: 154 MG/DL — HIGH (ref 70–99)
GLUCOSE SERPL-MCNC: 157 MG/DL — HIGH (ref 70–99)
GLUCOSE SERPL-MCNC: 157 MG/DL — HIGH (ref 70–99)
GLUCOSE SERPL-MCNC: 161 MG/DL — HIGH (ref 70–99)
GLUCOSE SERPL-MCNC: 168 MG/DL — HIGH (ref 70–99)
GLUCOSE SERPL-MCNC: 176 MG/DL — HIGH (ref 70–99)
GLUCOSE SERPL-MCNC: 189 MG/DL — HIGH (ref 70–99)
GLUCOSE SERPL-MCNC: 203 MG/DL — HIGH (ref 70–99)
GLUCOSE SERPL-MCNC: 219 MG/DL — HIGH (ref 70–99)
GLUCOSE SERPL-MCNC: 431 MG/DL — HIGH (ref 70–99)
GLUCOSE SERPL-MCNC: 59 MG/DL — LOW (ref 70–99)
GLUCOSE SERPL-MCNC: 81 MG/DL — SIGNIFICANT CHANGE UP (ref 70–99)
GLUCOSE SERPL-MCNC: 83 MG/DL — SIGNIFICANT CHANGE UP (ref 70–99)
GLUCOSE SERPL-MCNC: 85 MG/DL — SIGNIFICANT CHANGE UP (ref 70–99)
GLUCOSE SERPL-MCNC: 87 MG/DL — SIGNIFICANT CHANGE UP (ref 70–99)
GLUCOSE SERPL-MCNC: 87 MG/DL — SIGNIFICANT CHANGE UP (ref 70–99)
GLUCOSE SERPL-MCNC: 88 MG/DL — SIGNIFICANT CHANGE UP (ref 70–99)
GLUCOSE SERPL-MCNC: 89 MG/DL — SIGNIFICANT CHANGE UP (ref 70–99)
GLUCOSE SERPL-MCNC: 91 MG/DL — SIGNIFICANT CHANGE UP (ref 70–99)
GLUCOSE SERPL-MCNC: 91 MG/DL — SIGNIFICANT CHANGE UP (ref 70–99)
GLUCOSE SERPL-MCNC: 92 MG/DL — SIGNIFICANT CHANGE UP (ref 70–99)
GLUCOSE SERPL-MCNC: 95 MG/DL — SIGNIFICANT CHANGE UP (ref 70–99)
GLUCOSE SERPL-MCNC: 96 MG/DL — SIGNIFICANT CHANGE UP (ref 70–99)
GLUCOSE SERPL-MCNC: 99 MG/DL — SIGNIFICANT CHANGE UP (ref 70–99)
GLUCOSE SERPL-MCNC: 99 MG/DL — SIGNIFICANT CHANGE UP (ref 70–99)
GLUCOSE UR QL: NEGATIVE MG/DL — SIGNIFICANT CHANGE UP
GRAM STN FLD: SIGNIFICANT CHANGE UP
HBV CORE AB SER-ACNC: SIGNIFICANT CHANGE UP
HBV CORE IGM SER-ACNC: SIGNIFICANT CHANGE UP
HBV SURFACE AB SER-ACNC: <3 MIU/ML — LOW
HBV SURFACE AB SER-ACNC: <3 MIU/ML — LOW
HBV SURFACE AG SER-ACNC: SIGNIFICANT CHANGE UP
HCO3 BLDA-SCNC: 33 MMOL/L — HIGH (ref 21–28)
HCT VFR BLD CALC: 18.3 % — CRITICAL LOW (ref 39–50)
HCT VFR BLD CALC: 23.8 % — LOW (ref 39–50)
HCT VFR BLD CALC: 24.3 % — LOW (ref 39–50)
HCT VFR BLD CALC: 24.5 % — LOW (ref 39–50)
HCT VFR BLD CALC: 24.9 % — LOW (ref 39–50)
HCT VFR BLD CALC: 25.2 % — LOW (ref 39–50)
HCT VFR BLD CALC: 25.3 % — LOW (ref 39–50)
HCT VFR BLD CALC: 25.3 % — LOW (ref 39–50)
HCT VFR BLD CALC: 25.4 % — LOW (ref 39–50)
HCT VFR BLD CALC: 25.4 % — LOW (ref 39–50)
HCT VFR BLD CALC: 25.6 % — LOW (ref 39–50)
HCT VFR BLD CALC: 25.8 % — LOW (ref 39–50)
HCT VFR BLD CALC: 25.9 % — LOW (ref 39–50)
HCT VFR BLD CALC: 26.4 % — LOW (ref 39–50)
HCT VFR BLD CALC: 26.4 % — LOW (ref 39–50)
HCT VFR BLD CALC: 27 % — LOW (ref 39–50)
HCT VFR BLD CALC: 27.3 % — LOW (ref 39–50)
HCT VFR BLD CALC: 27.4 % — LOW (ref 39–50)
HCT VFR BLD CALC: 27.6 % — LOW (ref 39–50)
HCT VFR BLD CALC: 27.6 % — LOW (ref 39–50)
HCT VFR BLD CALC: 27.9 % — LOW (ref 39–50)
HCT VFR BLD CALC: 28 % — LOW (ref 39–50)
HCT VFR BLD CALC: 28.1 % — LOW (ref 39–50)
HCT VFR BLD CALC: 28.4 % — LOW (ref 39–50)
HCT VFR BLD CALC: 28.6 % — LOW (ref 39–50)
HCT VFR BLD CALC: 28.6 % — LOW (ref 39–50)
HCT VFR BLD CALC: 28.8 % — LOW (ref 39–50)
HCT VFR BLD CALC: 29 % — LOW (ref 39–50)
HCT VFR BLD CALC: 29 % — LOW (ref 39–50)
HCT VFR BLD CALC: 29.1 % — LOW (ref 39–50)
HCT VFR BLD CALC: 29.4 % — LOW (ref 39–50)
HCT VFR BLD CALC: 29.5 % — LOW (ref 39–50)
HCT VFR BLD CALC: 29.5 % — LOW (ref 39–50)
HCT VFR BLD CALC: 29.8 % — LOW (ref 39–50)
HCT VFR BLD CALC: 30 % — LOW (ref 39–50)
HCT VFR BLD CALC: 30.1 % — LOW (ref 39–50)
HCT VFR BLD CALC: 30.2 % — LOW (ref 39–50)
HCT VFR BLD CALC: 30.3 % — LOW (ref 39–50)
HCT VFR BLD CALC: 30.3 % — LOW (ref 39–50)
HCT VFR BLD CALC: 30.4 % — LOW (ref 39–50)
HCT VFR BLD CALC: 30.7 % — LOW (ref 39–50)
HCT VFR BLD CALC: 30.8 % — LOW (ref 39–50)
HCT VFR BLD CALC: 30.9 % — LOW (ref 39–50)
HCT VFR BLD CALC: 31 % — LOW (ref 39–50)
HCT VFR BLD CALC: 31.1 % — LOW (ref 39–50)
HCT VFR BLD CALC: 31.4 % — LOW (ref 39–50)
HCT VFR BLD CALC: 31.8 % — LOW (ref 39–50)
HCT VFR BLD CALC: 32 % — LOW (ref 39–50)
HCT VFR BLD CALC: 32.1 % — LOW (ref 39–50)
HCT VFR BLD CALC: 32.2 % — LOW (ref 39–50)
HCT VFR BLD CALC: 32.3 % — LOW (ref 39–50)
HCT VFR BLD CALC: 32.5 % — LOW (ref 39–50)
HCT VFR BLD CALC: 32.6 % — LOW (ref 39–50)
HCT VFR BLD CALC: 32.6 % — LOW (ref 39–50)
HCT VFR BLD CALC: 32.9 % — LOW (ref 39–50)
HCT VFR BLD CALC: 33.4 % — LOW (ref 39–50)
HCT VFR BLD CALC: 33.6 % — LOW (ref 39–50)
HCT VFR BLD CALC: 34 % — LOW (ref 39–50)
HCT VFR BLD CALC: 34.1 % — LOW (ref 39–50)
HCT VFR BLD CALC: 34.2 % — LOW (ref 39–50)
HCT VFR BLD CALC: 34.3 % — LOW (ref 39–50)
HCT VFR BLD CALC: 34.4 % — LOW (ref 39–50)
HCT VFR BLD CALC: 35.3 % — LOW (ref 39–50)
HCT VFR BLD CALC: 35.3 % — LOW (ref 39–50)
HCT VFR BLD CALC: 35.9 % — LOW (ref 39–50)
HCT VFR BLD CALC: 36.4 % — LOW (ref 39–50)
HCT VFR BLD CALC: 37.4 % — LOW (ref 39–50)
HCT VFR BLD CALC: 38.4 % — LOW (ref 39–50)
HCV AB S/CO SERPL IA: 0.17 S/CO — SIGNIFICANT CHANGE UP (ref 0–0.99)
HCV AB S/CO SERPL IA: 0.18 S/CO — SIGNIFICANT CHANGE UP (ref 0–0.99)
HCV AB SERPL-IMP: SIGNIFICANT CHANGE UP
HCV AB SERPL-IMP: SIGNIFICANT CHANGE UP
HGB BLD-MCNC: 10 G/DL — LOW (ref 13–17)
HGB BLD-MCNC: 10.1 G/DL — LOW (ref 13–17)
HGB BLD-MCNC: 10.3 G/DL — LOW (ref 13–17)
HGB BLD-MCNC: 10.3 G/DL — LOW (ref 13–17)
HGB BLD-MCNC: 10.4 G/DL — LOW (ref 13–17)
HGB BLD-MCNC: 10.5 G/DL — LOW (ref 13–17)
HGB BLD-MCNC: 10.6 G/DL — LOW (ref 13–17)
HGB BLD-MCNC: 10.7 G/DL — LOW (ref 13–17)
HGB BLD-MCNC: 10.9 G/DL — LOW (ref 13–17)
HGB BLD-MCNC: 11 G/DL — LOW (ref 13–17)
HGB BLD-MCNC: 11.2 G/DL — LOW (ref 13–17)
HGB BLD-MCNC: 11.3 G/DL — LOW (ref 13–17)
HGB BLD-MCNC: 11.8 G/DL — LOW (ref 13–17)
HGB BLD-MCNC: 6.2 G/DL — CRITICAL LOW (ref 13–17)
HGB BLD-MCNC: 7.3 G/DL — LOW (ref 13–17)
HGB BLD-MCNC: 7.6 G/DL — LOW (ref 13–17)
HGB BLD-MCNC: 7.9 G/DL — LOW (ref 13–17)
HGB BLD-MCNC: 7.9 G/DL — LOW (ref 13–17)
HGB BLD-MCNC: 8 G/DL — LOW (ref 13–17)
HGB BLD-MCNC: 8 G/DL — LOW (ref 13–17)
HGB BLD-MCNC: 8.1 G/DL — LOW (ref 13–17)
HGB BLD-MCNC: 8.1 G/DL — LOW (ref 13–17)
HGB BLD-MCNC: 8.2 G/DL — LOW (ref 13–17)
HGB BLD-MCNC: 8.3 G/DL — LOW (ref 13–17)
HGB BLD-MCNC: 8.3 G/DL — LOW (ref 13–17)
HGB BLD-MCNC: 8.4 G/DL — LOW (ref 13–17)
HGB BLD-MCNC: 8.5 G/DL — LOW (ref 13–17)
HGB BLD-MCNC: 8.5 G/DL — LOW (ref 13–17)
HGB BLD-MCNC: 8.6 G/DL — LOW (ref 13–17)
HGB BLD-MCNC: 8.7 G/DL — LOW (ref 13–17)
HGB BLD-MCNC: 8.7 G/DL — LOW (ref 13–17)
HGB BLD-MCNC: 8.8 G/DL — LOW (ref 13–17)
HGB BLD-MCNC: 8.8 G/DL — LOW (ref 13–17)
HGB BLD-MCNC: 9 G/DL — LOW (ref 13–17)
HGB BLD-MCNC: 9.1 G/DL — LOW (ref 13–17)
HGB BLD-MCNC: 9.2 G/DL — LOW (ref 13–17)
HGB BLD-MCNC: 9.3 G/DL — LOW (ref 13–17)
HGB BLD-MCNC: 9.3 G/DL — LOW (ref 13–17)
HGB BLD-MCNC: 9.4 G/DL — LOW (ref 13–17)
HGB BLD-MCNC: 9.5 G/DL — LOW (ref 13–17)
HGB BLD-MCNC: 9.6 G/DL — LOW (ref 13–17)
HGB BLD-MCNC: 9.7 G/DL — LOW (ref 13–17)
HGB BLD-MCNC: 9.8 G/DL — LOW (ref 13–17)
HGB BLD-MCNC: 9.9 G/DL — LOW (ref 13–17)
HGB BLD-MCNC: 9.9 G/DL — LOW (ref 13–17)
HOROWITZ INDEX BLDA+IHG-RTO: 4 — SIGNIFICANT CHANGE UP
HYPOCHROMIA BLD QL: SLIGHT — SIGNIFICANT CHANGE UP
IMM GRANULOCYTES NFR BLD AUTO: 0.2 % — SIGNIFICANT CHANGE UP (ref 0–1.5)
IMM GRANULOCYTES NFR BLD AUTO: 0.3 % — SIGNIFICANT CHANGE UP (ref 0–1.5)
IMM GRANULOCYTES NFR BLD AUTO: 0.4 % — SIGNIFICANT CHANGE UP (ref 0–1.5)
IMM GRANULOCYTES NFR BLD AUTO: 0.5 % — SIGNIFICANT CHANGE UP (ref 0–1.5)
IMM GRANULOCYTES NFR BLD AUTO: 0.6 % — SIGNIFICANT CHANGE UP (ref 0–1.5)
IMM GRANULOCYTES NFR BLD AUTO: 0.7 % — SIGNIFICANT CHANGE UP (ref 0–1.5)
IMM GRANULOCYTES NFR BLD AUTO: 0.8 % — SIGNIFICANT CHANGE UP (ref 0–1.5)
IMM GRANULOCYTES NFR BLD AUTO: 0.9 % — SIGNIFICANT CHANGE UP (ref 0–1.5)
IMM GRANULOCYTES NFR BLD AUTO: 1 % — SIGNIFICANT CHANGE UP (ref 0–1.5)
IMM GRANULOCYTES NFR BLD AUTO: 1.1 % — SIGNIFICANT CHANGE UP (ref 0–1.5)
INR BLD: 1.14 RATIO — SIGNIFICANT CHANGE UP (ref 0.88–1.16)
INR BLD: 1.17 RATIO — HIGH (ref 0.88–1.16)
INR BLD: 1.19 RATIO — HIGH (ref 0.88–1.16)
INR BLD: 1.2 RATIO — HIGH (ref 0.88–1.16)
INR BLD: 1.24 RATIO — HIGH (ref 0.88–1.16)
INR BLD: 1.25 RATIO — HIGH (ref 0.88–1.16)
INR BLD: 1.25 RATIO — HIGH (ref 0.88–1.16)
INR BLD: 1.26 RATIO — HIGH (ref 0.88–1.16)
INR BLD: 1.28 RATIO — HIGH (ref 0.88–1.16)
INR BLD: 1.29 RATIO — HIGH (ref 0.88–1.16)
INR BLD: 1.33 RATIO — HIGH (ref 0.88–1.16)
INR BLD: 1.39 RATIO — HIGH (ref 0.88–1.16)
INR BLD: 1.43 RATIO — HIGH (ref 0.88–1.16)
INR BLD: 1.77 RATIO — HIGH (ref 0.88–1.16)
INR BLD: 1.86 RATIO — HIGH (ref 0.88–1.16)
IRON SATN MFR SERPL: 20 % — SIGNIFICANT CHANGE UP (ref 16–55)
IRON SATN MFR SERPL: 37 UG/DL — LOW (ref 59–158)
KETONES UR-MCNC: NEGATIVE — SIGNIFICANT CHANGE UP
LACTATE SERPL-SCNC: 1.4 MMOL/L — SIGNIFICANT CHANGE UP (ref 0.5–2)
LACTATE SERPL-SCNC: 1.7 MMOL/L — SIGNIFICANT CHANGE UP (ref 0.5–2)
LACTATE SERPL-SCNC: 1.7 MMOL/L — SIGNIFICANT CHANGE UP (ref 0.5–2)
LACTATE SERPL-SCNC: 2.9 MMOL/L — HIGH (ref 0.5–2)
LDH SERPL L TO P-CCNC: 206 U/L — HIGH (ref 98–192)
LDH SERPL L TO P-CCNC: 92 U/L — SIGNIFICANT CHANGE UP
LEUKOCYTE ESTERASE UR-ACNC: ABNORMAL
LEUKOCYTE ESTERASE UR-ACNC: NEGATIVE — SIGNIFICANT CHANGE UP
LEUKOCYTE ESTERASE UR-ACNC: NEGATIVE — SIGNIFICANT CHANGE UP
LYMPHOCYTES # BLD AUTO: 0.11 K/UL — LOW (ref 1–3.3)
LYMPHOCYTES # BLD AUTO: 0.18 K/UL — LOW (ref 1–3.3)
LYMPHOCYTES # BLD AUTO: 0.2 K/UL — LOW (ref 1–3.3)
LYMPHOCYTES # BLD AUTO: 0.21 K/UL — LOW (ref 1–3.3)
LYMPHOCYTES # BLD AUTO: 0.22 K/UL — LOW (ref 1–3.3)
LYMPHOCYTES # BLD AUTO: 0.24 K/UL — LOW (ref 1–3.3)
LYMPHOCYTES # BLD AUTO: 0.25 K/UL — LOW (ref 1–3.3)
LYMPHOCYTES # BLD AUTO: 0.28 K/UL — LOW (ref 1–3.3)
LYMPHOCYTES # BLD AUTO: 0.29 K/UL — LOW (ref 1–3.3)
LYMPHOCYTES # BLD AUTO: 0.32 K/UL — LOW (ref 1–3.3)
LYMPHOCYTES # BLD AUTO: 0.33 K/UL — LOW (ref 1–3.3)
LYMPHOCYTES # BLD AUTO: 0.35 K/UL — LOW (ref 1–3.3)
LYMPHOCYTES # BLD AUTO: 0.36 K/UL — LOW (ref 1–3.3)
LYMPHOCYTES # BLD AUTO: 0.37 K/UL — LOW (ref 1–3.3)
LYMPHOCYTES # BLD AUTO: 0.39 K/UL — LOW (ref 1–3.3)
LYMPHOCYTES # BLD AUTO: 0.4 K/UL — LOW (ref 1–3.3)
LYMPHOCYTES # BLD AUTO: 0.41 K/UL — LOW (ref 1–3.3)
LYMPHOCYTES # BLD AUTO: 0.41 K/UL — LOW (ref 1–3.3)
LYMPHOCYTES # BLD AUTO: 0.42 K/UL — LOW (ref 1–3.3)
LYMPHOCYTES # BLD AUTO: 0.42 K/UL — LOW (ref 1–3.3)
LYMPHOCYTES # BLD AUTO: 0.43 K/UL — LOW (ref 1–3.3)
LYMPHOCYTES # BLD AUTO: 0.44 K/UL — LOW (ref 1–3.3)
LYMPHOCYTES # BLD AUTO: 0.45 K/UL — LOW (ref 1–3.3)
LYMPHOCYTES # BLD AUTO: 0.45 K/UL — LOW (ref 1–3.3)
LYMPHOCYTES # BLD AUTO: 0.46 K/UL — LOW (ref 1–3.3)
LYMPHOCYTES # BLD AUTO: 0.46 K/UL — LOW (ref 1–3.3)
LYMPHOCYTES # BLD AUTO: 0.49 K/UL — LOW (ref 1–3.3)
LYMPHOCYTES # BLD AUTO: 0.49 K/UL — LOW (ref 1–3.3)
LYMPHOCYTES # BLD AUTO: 0.5 K/UL — LOW (ref 1–3.3)
LYMPHOCYTES # BLD AUTO: 0.5 K/UL — LOW (ref 1–3.3)
LYMPHOCYTES # BLD AUTO: 0.51 K/UL — LOW (ref 1–3.3)
LYMPHOCYTES # BLD AUTO: 0.53 K/UL — LOW (ref 1–3.3)
LYMPHOCYTES # BLD AUTO: 0.53 K/UL — LOW (ref 1–3.3)
LYMPHOCYTES # BLD AUTO: 0.56 K/UL — LOW (ref 1–3.3)
LYMPHOCYTES # BLD AUTO: 0.57 K/UL — LOW (ref 1–3.3)
LYMPHOCYTES # BLD AUTO: 0.62 K/UL — LOW (ref 1–3.3)
LYMPHOCYTES # BLD AUTO: 0.7 K/UL — LOW (ref 1–3.3)
LYMPHOCYTES # BLD AUTO: 1.28 K/UL — SIGNIFICANT CHANGE UP (ref 1–3.3)
LYMPHOCYTES # BLD AUTO: 1.8 % — LOW (ref 13–44)
LYMPHOCYTES # BLD AUTO: 1.9 % — LOW (ref 13–44)
LYMPHOCYTES # BLD AUTO: 11 % — LOW (ref 13–44)
LYMPHOCYTES # BLD AUTO: 2.6 % — LOW (ref 13–44)
LYMPHOCYTES # BLD AUTO: 3.5 % — LOW (ref 13–44)
LYMPHOCYTES # BLD AUTO: 3.8 % — LOW (ref 13–44)
LYMPHOCYTES # BLD AUTO: 4.3 % — LOW (ref 13–44)
LYMPHOCYTES # BLD AUTO: 4.3 % — LOW (ref 13–44)
LYMPHOCYTES # BLD AUTO: 4.4 % — LOW (ref 13–44)
LYMPHOCYTES # BLD AUTO: 5 % — LOW (ref 13–44)
LYMPHOCYTES # BLD AUTO: 5 % — LOW (ref 13–44)
LYMPHOCYTES # BLD AUTO: 5.2 % — LOW (ref 13–44)
LYMPHOCYTES # BLD AUTO: 5.3 % — LOW (ref 13–44)
LYMPHOCYTES # BLD AUTO: 5.3 % — LOW (ref 13–44)
LYMPHOCYTES # BLD AUTO: 5.4 % — LOW (ref 13–44)
LYMPHOCYTES # BLD AUTO: 5.6 % — LOW (ref 13–44)
LYMPHOCYTES # BLD AUTO: 5.7 % — LOW (ref 13–44)
LYMPHOCYTES # BLD AUTO: 5.8 % — LOW (ref 13–44)
LYMPHOCYTES # BLD AUTO: 5.8 % — LOW (ref 13–44)
LYMPHOCYTES # BLD AUTO: 5.9 % — LOW (ref 13–44)
LYMPHOCYTES # BLD AUTO: 6 % — LOW (ref 13–44)
LYMPHOCYTES # BLD AUTO: 6.1 % — LOW (ref 13–44)
LYMPHOCYTES # BLD AUTO: 6.6 % — LOW (ref 13–44)
LYMPHOCYTES # BLD AUTO: 6.6 % — LOW (ref 13–44)
LYMPHOCYTES # BLD AUTO: 6.8 % — LOW (ref 13–44)
LYMPHOCYTES # BLD AUTO: 7.1 % — LOW (ref 13–44)
LYMPHOCYTES # BLD AUTO: 7.2 % — LOW (ref 13–44)
LYMPHOCYTES # BLD AUTO: 7.8 % — LOW (ref 13–44)
LYMPHOCYTES # BLD AUTO: 7.8 % — LOW (ref 13–44)
LYMPHOCYTES # BLD AUTO: 7.9 % — LOW (ref 13–44)
LYMPHOCYTES # BLD AUTO: 8.1 % — LOW (ref 13–44)
LYMPHOCYTES # BLD AUTO: 8.5 % — LOW (ref 13–44)
LYMPHOCYTES # BLD AUTO: 8.7 % — LOW (ref 13–44)
LYMPHOCYTES # BLD AUTO: 9 % — LOW (ref 13–44)
LYMPHOCYTES # BLD AUTO: 9.6 % — LOW (ref 13–44)
LYMPHOCYTES # FLD: 5 % — SIGNIFICANT CHANGE UP
MACROCYTES BLD QL: SLIGHT — SIGNIFICANT CHANGE UP
MAGNESIUM SERPL-MCNC: 1.6 MG/DL — SIGNIFICANT CHANGE UP (ref 1.6–2.6)
MAGNESIUM SERPL-MCNC: 1.7 MG/DL — SIGNIFICANT CHANGE UP (ref 1.6–2.6)
MAGNESIUM SERPL-MCNC: 1.8 MG/DL — SIGNIFICANT CHANGE UP (ref 1.6–2.6)
MAGNESIUM SERPL-MCNC: 1.9 MG/DL — SIGNIFICANT CHANGE UP (ref 1.6–2.6)
MAGNESIUM SERPL-MCNC: 1.9 MG/DL — SIGNIFICANT CHANGE UP (ref 1.8–2.6)
MAGNESIUM SERPL-MCNC: 2 MG/DL — SIGNIFICANT CHANGE UP (ref 1.6–2.6)
MAGNESIUM SERPL-MCNC: 2 MG/DL — SIGNIFICANT CHANGE UP (ref 1.8–2.6)
MAGNESIUM SERPL-MCNC: 2 MG/DL — SIGNIFICANT CHANGE UP (ref 1.8–2.6)
MAGNESIUM SERPL-MCNC: 2.1 MG/DL — SIGNIFICANT CHANGE UP (ref 1.6–2.6)
MAGNESIUM SERPL-MCNC: 2.1 MG/DL — SIGNIFICANT CHANGE UP (ref 1.8–2.6)
MAGNESIUM SERPL-MCNC: 2.2 MG/DL — SIGNIFICANT CHANGE UP (ref 1.6–2.6)
MAGNESIUM SERPL-MCNC: 2.2 MG/DL — SIGNIFICANT CHANGE UP (ref 1.6–2.6)
MAGNESIUM SERPL-MCNC: 2.2 MG/DL — SIGNIFICANT CHANGE UP (ref 1.8–2.6)
MAGNESIUM SERPL-MCNC: 2.3 MG/DL — SIGNIFICANT CHANGE UP (ref 1.6–2.6)
MAGNESIUM SERPL-MCNC: 2.3 MG/DL — SIGNIFICANT CHANGE UP (ref 1.6–2.6)
MAGNESIUM SERPL-MCNC: 2.3 MG/DL — SIGNIFICANT CHANGE UP (ref 1.8–2.6)
MAGNESIUM SERPL-MCNC: 2.4 MG/DL — SIGNIFICANT CHANGE UP (ref 1.6–2.6)
MAGNESIUM SERPL-MCNC: 2.4 MG/DL — SIGNIFICANT CHANGE UP (ref 1.8–2.6)
MANUAL SMEAR VERIFICATION: SIGNIFICANT CHANGE UP
MCHC RBC-ENTMCNC: 28.5 PG — SIGNIFICANT CHANGE UP (ref 27–34)
MCHC RBC-ENTMCNC: 28.7 PG — SIGNIFICANT CHANGE UP (ref 27–34)
MCHC RBC-ENTMCNC: 28.9 PG — SIGNIFICANT CHANGE UP (ref 27–34)
MCHC RBC-ENTMCNC: 29.1 PG — SIGNIFICANT CHANGE UP (ref 27–34)
MCHC RBC-ENTMCNC: 29.1 PG — SIGNIFICANT CHANGE UP (ref 27–34)
MCHC RBC-ENTMCNC: 29.3 PG — SIGNIFICANT CHANGE UP (ref 27–34)
MCHC RBC-ENTMCNC: 29.3 PG — SIGNIFICANT CHANGE UP (ref 27–34)
MCHC RBC-ENTMCNC: 29.4 PG — SIGNIFICANT CHANGE UP (ref 27–34)
MCHC RBC-ENTMCNC: 29.4 PG — SIGNIFICANT CHANGE UP (ref 27–34)
MCHC RBC-ENTMCNC: 29.5 GM/DL — LOW (ref 32–36)
MCHC RBC-ENTMCNC: 29.5 GM/DL — LOW (ref 32–36)
MCHC RBC-ENTMCNC: 29.5 PG — SIGNIFICANT CHANGE UP (ref 27–34)
MCHC RBC-ENTMCNC: 29.5 PG — SIGNIFICANT CHANGE UP (ref 27–34)
MCHC RBC-ENTMCNC: 29.6 PG — SIGNIFICANT CHANGE UP (ref 27–34)
MCHC RBC-ENTMCNC: 29.6 PG — SIGNIFICANT CHANGE UP (ref 27–34)
MCHC RBC-ENTMCNC: 29.7 GM/DL — LOW (ref 32–36)
MCHC RBC-ENTMCNC: 29.7 PG — SIGNIFICANT CHANGE UP (ref 27–34)
MCHC RBC-ENTMCNC: 29.8 PG — SIGNIFICANT CHANGE UP (ref 27–34)
MCHC RBC-ENTMCNC: 29.9 GM/DL — LOW (ref 32–36)
MCHC RBC-ENTMCNC: 29.9 PG — SIGNIFICANT CHANGE UP (ref 27–34)
MCHC RBC-ENTMCNC: 30 GM/DL — LOW (ref 32–36)
MCHC RBC-ENTMCNC: 30 PG — SIGNIFICANT CHANGE UP (ref 27–34)
MCHC RBC-ENTMCNC: 30.1 GM/DL — LOW (ref 32–36)
MCHC RBC-ENTMCNC: 30.2 GM/DL — LOW (ref 32–36)
MCHC RBC-ENTMCNC: 30.2 GM/DL — LOW (ref 32–36)
MCHC RBC-ENTMCNC: 30.2 PG — SIGNIFICANT CHANGE UP (ref 27–34)
MCHC RBC-ENTMCNC: 30.3 GM/DL — LOW (ref 32–36)
MCHC RBC-ENTMCNC: 30.4 GM/DL — LOW (ref 32–36)
MCHC RBC-ENTMCNC: 30.4 GM/DL — LOW (ref 32–36)
MCHC RBC-ENTMCNC: 30.4 PG — SIGNIFICANT CHANGE UP (ref 27–34)
MCHC RBC-ENTMCNC: 30.5 GM/DL — LOW (ref 32–36)
MCHC RBC-ENTMCNC: 30.5 PG — SIGNIFICANT CHANGE UP (ref 27–34)
MCHC RBC-ENTMCNC: 30.6 GM/DL — LOW (ref 32–36)
MCHC RBC-ENTMCNC: 30.7 GM/DL — LOW (ref 32–36)
MCHC RBC-ENTMCNC: 30.7 PG — SIGNIFICANT CHANGE UP (ref 27–34)
MCHC RBC-ENTMCNC: 30.8 GM/DL — LOW (ref 32–36)
MCHC RBC-ENTMCNC: 30.8 PG — SIGNIFICANT CHANGE UP (ref 27–34)
MCHC RBC-ENTMCNC: 30.9 GM/DL — LOW (ref 32–36)
MCHC RBC-ENTMCNC: 30.9 GM/DL — LOW (ref 32–36)
MCHC RBC-ENTMCNC: 30.9 PG — SIGNIFICANT CHANGE UP (ref 27–34)
MCHC RBC-ENTMCNC: 31 GM/DL — LOW (ref 32–36)
MCHC RBC-ENTMCNC: 31.1 GM/DL — LOW (ref 32–36)
MCHC RBC-ENTMCNC: 31.1 PG — SIGNIFICANT CHANGE UP (ref 27–34)
MCHC RBC-ENTMCNC: 31.2 GM/DL — LOW (ref 32–36)
MCHC RBC-ENTMCNC: 31.2 PG — SIGNIFICANT CHANGE UP (ref 27–34)
MCHC RBC-ENTMCNC: 31.3 GM/DL — LOW (ref 32–36)
MCHC RBC-ENTMCNC: 31.3 GM/DL — LOW (ref 32–36)
MCHC RBC-ENTMCNC: 31.3 PG — SIGNIFICANT CHANGE UP (ref 27–34)
MCHC RBC-ENTMCNC: 31.4 PG — SIGNIFICANT CHANGE UP (ref 27–34)
MCHC RBC-ENTMCNC: 31.4 PG — SIGNIFICANT CHANGE UP (ref 27–34)
MCHC RBC-ENTMCNC: 31.5 GM/DL — LOW (ref 32–36)
MCHC RBC-ENTMCNC: 31.5 PG — SIGNIFICANT CHANGE UP (ref 27–34)
MCHC RBC-ENTMCNC: 31.6 GM/DL — LOW (ref 32–36)
MCHC RBC-ENTMCNC: 31.6 PG — SIGNIFICANT CHANGE UP (ref 27–34)
MCHC RBC-ENTMCNC: 31.8 GM/DL — LOW (ref 32–36)
MCHC RBC-ENTMCNC: 31.8 GM/DL — LOW (ref 32–36)
MCHC RBC-ENTMCNC: 31.8 PG — SIGNIFICANT CHANGE UP (ref 27–34)
MCHC RBC-ENTMCNC: 31.9 GM/DL — LOW (ref 32–36)
MCHC RBC-ENTMCNC: 31.9 GM/DL — LOW (ref 32–36)
MCHC RBC-ENTMCNC: 31.9 PG — SIGNIFICANT CHANGE UP (ref 27–34)
MCHC RBC-ENTMCNC: 31.9 PG — SIGNIFICANT CHANGE UP (ref 27–34)
MCHC RBC-ENTMCNC: 32 GM/DL — SIGNIFICANT CHANGE UP (ref 32–36)
MCHC RBC-ENTMCNC: 32 GM/DL — SIGNIFICANT CHANGE UP (ref 32–36)
MCHC RBC-ENTMCNC: 32 PG — SIGNIFICANT CHANGE UP (ref 27–34)
MCHC RBC-ENTMCNC: 32.2 GM/DL — SIGNIFICANT CHANGE UP (ref 32–36)
MCHC RBC-ENTMCNC: 32.2 PG — SIGNIFICANT CHANGE UP (ref 27–34)
MCHC RBC-ENTMCNC: 32.9 GM/DL — SIGNIFICANT CHANGE UP (ref 32–36)
MCHC RBC-ENTMCNC: 32.9 GM/DL — SIGNIFICANT CHANGE UP (ref 32–36)
MCHC RBC-ENTMCNC: 33.1 GM/DL — SIGNIFICANT CHANGE UP (ref 32–36)
MCHC RBC-ENTMCNC: 33.5 GM/DL — SIGNIFICANT CHANGE UP (ref 32–36)
MCHC RBC-ENTMCNC: 33.9 GM/DL — SIGNIFICANT CHANGE UP (ref 32–36)
MCHC RBC-ENTMCNC: 34.1 GM/DL — SIGNIFICANT CHANGE UP (ref 32–36)
MCHC RBC-ENTMCNC: 34.4 GM/DL — SIGNIFICANT CHANGE UP (ref 32–36)
MCV RBC AUTO: 100 FL — SIGNIFICANT CHANGE UP (ref 80–100)
MCV RBC AUTO: 100 FL — SIGNIFICANT CHANGE UP (ref 80–100)
MCV RBC AUTO: 100.3 FL — HIGH (ref 80–100)
MCV RBC AUTO: 100.3 FL — HIGH (ref 80–100)
MCV RBC AUTO: 100.4 FL — HIGH (ref 80–100)
MCV RBC AUTO: 100.7 FL — HIGH (ref 80–100)
MCV RBC AUTO: 100.7 FL — HIGH (ref 80–100)
MCV RBC AUTO: 100.8 FL — HIGH (ref 80–100)
MCV RBC AUTO: 100.9 FL — HIGH (ref 80–100)
MCV RBC AUTO: 101 FL — HIGH (ref 80–100)
MCV RBC AUTO: 101.1 FL — HIGH (ref 80–100)
MCV RBC AUTO: 101.1 FL — HIGH (ref 80–100)
MCV RBC AUTO: 101.2 FL — HIGH (ref 80–100)
MCV RBC AUTO: 101.3 FL — HIGH (ref 80–100)
MCV RBC AUTO: 101.4 FL — HIGH (ref 80–100)
MCV RBC AUTO: 101.7 FL — HIGH (ref 80–100)
MCV RBC AUTO: 101.8 FL — HIGH (ref 80–100)
MCV RBC AUTO: 101.9 FL — HIGH (ref 80–100)
MCV RBC AUTO: 101.9 FL — HIGH (ref 80–100)
MCV RBC AUTO: 102.2 FL — HIGH (ref 80–100)
MCV RBC AUTO: 102.3 FL — HIGH (ref 80–100)
MCV RBC AUTO: 102.4 FL — HIGH (ref 80–100)
MCV RBC AUTO: 102.4 FL — HIGH (ref 80–100)
MCV RBC AUTO: 102.8 FL — HIGH (ref 80–100)
MCV RBC AUTO: 103.2 FL — HIGH (ref 80–100)
MCV RBC AUTO: 103.4 FL — HIGH (ref 80–100)
MCV RBC AUTO: 103.7 FL — HIGH (ref 80–100)
MCV RBC AUTO: 104 FL — HIGH (ref 80–100)
MCV RBC AUTO: 104.1 FL — HIGH (ref 80–100)
MCV RBC AUTO: 104.3 FL — HIGH (ref 80–100)
MCV RBC AUTO: 89.1 FL — SIGNIFICANT CHANGE UP (ref 80–100)
MCV RBC AUTO: 89.4 FL — SIGNIFICANT CHANGE UP (ref 80–100)
MCV RBC AUTO: 90.1 FL — SIGNIFICANT CHANGE UP (ref 80–100)
MCV RBC AUTO: 90.5 FL — SIGNIFICANT CHANGE UP (ref 80–100)
MCV RBC AUTO: 91 FL — SIGNIFICANT CHANGE UP (ref 80–100)
MCV RBC AUTO: 91.8 FL — SIGNIFICANT CHANGE UP (ref 80–100)
MCV RBC AUTO: 92.4 FL — SIGNIFICANT CHANGE UP (ref 80–100)
MCV RBC AUTO: 92.5 FL — SIGNIFICANT CHANGE UP (ref 80–100)
MCV RBC AUTO: 93 FL — SIGNIFICANT CHANGE UP (ref 80–100)
MCV RBC AUTO: 93.2 FL — SIGNIFICANT CHANGE UP (ref 80–100)
MCV RBC AUTO: 93.5 FL — SIGNIFICANT CHANGE UP (ref 80–100)
MCV RBC AUTO: 94.2 FL — SIGNIFICANT CHANGE UP (ref 80–100)
MCV RBC AUTO: 95 FL — SIGNIFICANT CHANGE UP (ref 80–100)
MCV RBC AUTO: 95.7 FL — SIGNIFICANT CHANGE UP (ref 80–100)
MCV RBC AUTO: 95.8 FL — SIGNIFICANT CHANGE UP (ref 80–100)
MCV RBC AUTO: 95.9 FL — SIGNIFICANT CHANGE UP (ref 80–100)
MCV RBC AUTO: 95.9 FL — SIGNIFICANT CHANGE UP (ref 80–100)
MCV RBC AUTO: 96 FL — SIGNIFICANT CHANGE UP (ref 80–100)
MCV RBC AUTO: 96 FL — SIGNIFICANT CHANGE UP (ref 80–100)
MCV RBC AUTO: 96.3 FL — SIGNIFICANT CHANGE UP (ref 80–100)
MCV RBC AUTO: 96.4 FL — SIGNIFICANT CHANGE UP (ref 80–100)
MCV RBC AUTO: 96.6 FL — SIGNIFICANT CHANGE UP (ref 80–100)
MCV RBC AUTO: 96.7 FL — SIGNIFICANT CHANGE UP (ref 80–100)
MCV RBC AUTO: 96.9 FL — SIGNIFICANT CHANGE UP (ref 80–100)
MCV RBC AUTO: 96.9 FL — SIGNIFICANT CHANGE UP (ref 80–100)
MCV RBC AUTO: 97 FL — SIGNIFICANT CHANGE UP (ref 80–100)
MCV RBC AUTO: 97.1 FL — SIGNIFICANT CHANGE UP (ref 80–100)
MCV RBC AUTO: 97.6 FL — SIGNIFICANT CHANGE UP (ref 80–100)
MCV RBC AUTO: 98.1 FL — SIGNIFICANT CHANGE UP (ref 80–100)
MCV RBC AUTO: 98.2 FL — SIGNIFICANT CHANGE UP (ref 80–100)
MCV RBC AUTO: 98.5 FL — SIGNIFICANT CHANGE UP (ref 80–100)
MCV RBC AUTO: 98.7 FL — SIGNIFICANT CHANGE UP (ref 80–100)
MCV RBC AUTO: 99.3 FL — SIGNIFICANT CHANGE UP (ref 80–100)
MCV RBC AUTO: 99.3 FL — SIGNIFICANT CHANGE UP (ref 80–100)
MCV RBC AUTO: 99.4 FL — SIGNIFICANT CHANGE UP (ref 80–100)
MCV RBC AUTO: 99.4 FL — SIGNIFICANT CHANGE UP (ref 80–100)
MCV RBC AUTO: 99.7 FL — SIGNIFICANT CHANGE UP (ref 80–100)
MCV RBC AUTO: 99.7 FL — SIGNIFICANT CHANGE UP (ref 80–100)
MESOTHL CELL # FLD: 55 % — SIGNIFICANT CHANGE UP
METAMYELOCYTES # FLD: 0.9 % — HIGH (ref 0–0)
MICROCYTES BLD QL: SLIGHT — SIGNIFICANT CHANGE UP
MONOCYTES # BLD AUTO: 0.21 K/UL — SIGNIFICANT CHANGE UP (ref 0–0.9)
MONOCYTES # BLD AUTO: 0.24 K/UL — SIGNIFICANT CHANGE UP (ref 0–0.9)
MONOCYTES # BLD AUTO: 0.26 K/UL — SIGNIFICANT CHANGE UP (ref 0–0.9)
MONOCYTES # BLD AUTO: 0.28 K/UL — SIGNIFICANT CHANGE UP (ref 0–0.9)
MONOCYTES # BLD AUTO: 0.3 K/UL — SIGNIFICANT CHANGE UP (ref 0–0.9)
MONOCYTES # BLD AUTO: 0.31 K/UL — SIGNIFICANT CHANGE UP (ref 0–0.9)
MONOCYTES # BLD AUTO: 0.31 K/UL — SIGNIFICANT CHANGE UP (ref 0–0.9)
MONOCYTES # BLD AUTO: 0.33 K/UL — SIGNIFICANT CHANGE UP (ref 0–0.9)
MONOCYTES # BLD AUTO: 0.33 K/UL — SIGNIFICANT CHANGE UP (ref 0–0.9)
MONOCYTES # BLD AUTO: 0.35 K/UL — SIGNIFICANT CHANGE UP (ref 0–0.9)
MONOCYTES # BLD AUTO: 0.39 K/UL — SIGNIFICANT CHANGE UP (ref 0–0.9)
MONOCYTES # BLD AUTO: 0.39 K/UL — SIGNIFICANT CHANGE UP (ref 0–0.9)
MONOCYTES # BLD AUTO: 0.41 K/UL — SIGNIFICANT CHANGE UP (ref 0–0.9)
MONOCYTES # BLD AUTO: 0.45 K/UL — SIGNIFICANT CHANGE UP (ref 0–0.9)
MONOCYTES # BLD AUTO: 0.47 K/UL — SIGNIFICANT CHANGE UP (ref 0–0.9)
MONOCYTES # BLD AUTO: 0.48 K/UL — SIGNIFICANT CHANGE UP (ref 0–0.9)
MONOCYTES # BLD AUTO: 0.48 K/UL — SIGNIFICANT CHANGE UP (ref 0–0.9)
MONOCYTES # BLD AUTO: 0.52 K/UL — SIGNIFICANT CHANGE UP (ref 0–0.9)
MONOCYTES # BLD AUTO: 0.55 K/UL — SIGNIFICANT CHANGE UP (ref 0–0.9)
MONOCYTES # BLD AUTO: 0.56 K/UL — SIGNIFICANT CHANGE UP (ref 0–0.9)
MONOCYTES # BLD AUTO: 0.57 K/UL — SIGNIFICANT CHANGE UP (ref 0–0.9)
MONOCYTES # BLD AUTO: 0.58 K/UL — SIGNIFICANT CHANGE UP (ref 0–0.9)
MONOCYTES # BLD AUTO: 0.58 K/UL — SIGNIFICANT CHANGE UP (ref 0–0.9)
MONOCYTES # BLD AUTO: 0.62 K/UL — SIGNIFICANT CHANGE UP (ref 0–0.9)
MONOCYTES # BLD AUTO: 0.62 K/UL — SIGNIFICANT CHANGE UP (ref 0–0.9)
MONOCYTES # BLD AUTO: 0.63 K/UL — SIGNIFICANT CHANGE UP (ref 0–0.9)
MONOCYTES # BLD AUTO: 0.63 K/UL — SIGNIFICANT CHANGE UP (ref 0–0.9)
MONOCYTES # BLD AUTO: 0.66 K/UL — SIGNIFICANT CHANGE UP (ref 0–0.9)
MONOCYTES # BLD AUTO: 0.66 K/UL — SIGNIFICANT CHANGE UP (ref 0–0.9)
MONOCYTES # BLD AUTO: 0.68 K/UL — SIGNIFICANT CHANGE UP (ref 0–0.9)
MONOCYTES # BLD AUTO: 0.75 K/UL — SIGNIFICANT CHANGE UP (ref 0–0.9)
MONOCYTES # BLD AUTO: 0.75 K/UL — SIGNIFICANT CHANGE UP (ref 0–0.9)
MONOCYTES # BLD AUTO: 0.82 K/UL — SIGNIFICANT CHANGE UP (ref 0–0.9)
MONOCYTES # BLD AUTO: 0.85 K/UL — SIGNIFICANT CHANGE UP (ref 0–0.9)
MONOCYTES # BLD AUTO: 0.91 K/UL — HIGH (ref 0–0.9)
MONOCYTES # BLD AUTO: 1.65 K/UL — HIGH (ref 0–0.9)
MONOCYTES NFR BLD AUTO: 10.1 % — SIGNIFICANT CHANGE UP (ref 2–14)
MONOCYTES NFR BLD AUTO: 10.2 % — SIGNIFICANT CHANGE UP (ref 2–14)
MONOCYTES NFR BLD AUTO: 10.3 % — SIGNIFICANT CHANGE UP (ref 2–14)
MONOCYTES NFR BLD AUTO: 10.4 % — SIGNIFICANT CHANGE UP (ref 2–14)
MONOCYTES NFR BLD AUTO: 11.2 % — SIGNIFICANT CHANGE UP (ref 2–14)
MONOCYTES NFR BLD AUTO: 11.8 % — SIGNIFICANT CHANGE UP (ref 2–14)
MONOCYTES NFR BLD AUTO: 3.4 % — SIGNIFICANT CHANGE UP (ref 2–14)
MONOCYTES NFR BLD AUTO: 3.5 % — SIGNIFICANT CHANGE UP (ref 2–14)
MONOCYTES NFR BLD AUTO: 4.3 % — SIGNIFICANT CHANGE UP (ref 2–14)
MONOCYTES NFR BLD AUTO: 4.4 % — SIGNIFICANT CHANGE UP (ref 2–14)
MONOCYTES NFR BLD AUTO: 4.6 % — SIGNIFICANT CHANGE UP (ref 2–14)
MONOCYTES NFR BLD AUTO: 5.2 % — SIGNIFICANT CHANGE UP (ref 2–14)
MONOCYTES NFR BLD AUTO: 5.3 % — SIGNIFICANT CHANGE UP (ref 2–14)
MONOCYTES NFR BLD AUTO: 5.4 % — SIGNIFICANT CHANGE UP (ref 2–14)
MONOCYTES NFR BLD AUTO: 5.4 % — SIGNIFICANT CHANGE UP (ref 2–14)
MONOCYTES NFR BLD AUTO: 5.8 % — SIGNIFICANT CHANGE UP (ref 2–14)
MONOCYTES NFR BLD AUTO: 6 % — SIGNIFICANT CHANGE UP (ref 2–14)
MONOCYTES NFR BLD AUTO: 6.3 % — SIGNIFICANT CHANGE UP (ref 2–14)
MONOCYTES NFR BLD AUTO: 6.5 % — SIGNIFICANT CHANGE UP (ref 2–14)
MONOCYTES NFR BLD AUTO: 7 % — SIGNIFICANT CHANGE UP (ref 2–14)
MONOCYTES NFR BLD AUTO: 7 % — SIGNIFICANT CHANGE UP (ref 2–14)
MONOCYTES NFR BLD AUTO: 7.3 % — SIGNIFICANT CHANGE UP (ref 2–14)
MONOCYTES NFR BLD AUTO: 7.4 % — SIGNIFICANT CHANGE UP (ref 2–14)
MONOCYTES NFR BLD AUTO: 7.5 % — SIGNIFICANT CHANGE UP (ref 2–14)
MONOCYTES NFR BLD AUTO: 7.8 % — SIGNIFICANT CHANGE UP (ref 2–14)
MONOCYTES NFR BLD AUTO: 8.1 % — SIGNIFICANT CHANGE UP (ref 2–14)
MONOCYTES NFR BLD AUTO: 8.4 % — SIGNIFICANT CHANGE UP (ref 2–14)
MONOCYTES NFR BLD AUTO: 8.4 % — SIGNIFICANT CHANGE UP (ref 2–14)
MONOCYTES NFR BLD AUTO: 8.6 % — SIGNIFICANT CHANGE UP (ref 2–14)
MONOCYTES NFR BLD AUTO: 8.6 % — SIGNIFICANT CHANGE UP (ref 2–14)
MONOCYTES NFR BLD AUTO: 8.7 % — SIGNIFICANT CHANGE UP (ref 2–14)
MONOCYTES NFR BLD AUTO: 8.9 % — SIGNIFICANT CHANGE UP (ref 2–14)
MONOCYTES NFR BLD AUTO: 9 % — SIGNIFICANT CHANGE UP (ref 2–14)
MONOCYTES NFR BLD AUTO: 9 % — SIGNIFICANT CHANGE UP (ref 2–14)
MONOCYTES NFR BLD AUTO: 9.1 % — SIGNIFICANT CHANGE UP (ref 2–14)
MONOCYTES NFR BLD AUTO: 9.6 % — SIGNIFICANT CHANGE UP (ref 2–14)
MONOCYTES NFR BLD AUTO: 9.6 % — SIGNIFICANT CHANGE UP (ref 2–14)
MONOS+MACROS # FLD: 21 % — SIGNIFICANT CHANGE UP
MRSA PCR RESULT.: SIGNIFICANT CHANGE UP
MYELOCYTES NFR BLD: 0.9 % — HIGH (ref 0–0)
NEUTROPHILS # BLD AUTO: 12.66 K/UL — HIGH (ref 1.8–7.4)
NEUTROPHILS # BLD AUTO: 3.89 K/UL — SIGNIFICANT CHANGE UP (ref 1.8–7.4)
NEUTROPHILS # BLD AUTO: 3.98 K/UL — SIGNIFICANT CHANGE UP (ref 1.8–7.4)
NEUTROPHILS # BLD AUTO: 4.39 K/UL — SIGNIFICANT CHANGE UP (ref 1.8–7.4)
NEUTROPHILS # BLD AUTO: 4.48 K/UL — SIGNIFICANT CHANGE UP (ref 1.8–7.4)
NEUTROPHILS # BLD AUTO: 5.02 K/UL — SIGNIFICANT CHANGE UP (ref 1.8–7.4)
NEUTROPHILS # BLD AUTO: 5.04 K/UL — SIGNIFICANT CHANGE UP (ref 1.8–7.4)
NEUTROPHILS # BLD AUTO: 5.14 K/UL — SIGNIFICANT CHANGE UP (ref 1.8–7.4)
NEUTROPHILS # BLD AUTO: 5.31 K/UL — SIGNIFICANT CHANGE UP (ref 1.8–7.4)
NEUTROPHILS # BLD AUTO: 5.45 K/UL — SIGNIFICANT CHANGE UP (ref 1.8–7.4)
NEUTROPHILS # BLD AUTO: 5.47 K/UL — SIGNIFICANT CHANGE UP (ref 1.8–7.4)
NEUTROPHILS # BLD AUTO: 5.47 K/UL — SIGNIFICANT CHANGE UP (ref 1.8–7.4)
NEUTROPHILS # BLD AUTO: 5.56 K/UL — SIGNIFICANT CHANGE UP (ref 1.8–7.4)
NEUTROPHILS # BLD AUTO: 5.66 K/UL — SIGNIFICANT CHANGE UP (ref 1.8–7.4)
NEUTROPHILS # BLD AUTO: 5.67 K/UL — SIGNIFICANT CHANGE UP (ref 1.8–7.4)
NEUTROPHILS # BLD AUTO: 5.68 K/UL — SIGNIFICANT CHANGE UP (ref 1.8–7.4)
NEUTROPHILS # BLD AUTO: 5.82 K/UL — SIGNIFICANT CHANGE UP (ref 1.8–7.4)
NEUTROPHILS # BLD AUTO: 5.9 K/UL — SIGNIFICANT CHANGE UP (ref 1.8–7.4)
NEUTROPHILS # BLD AUTO: 5.9 K/UL — SIGNIFICANT CHANGE UP (ref 1.8–7.4)
NEUTROPHILS # BLD AUTO: 5.92 K/UL — SIGNIFICANT CHANGE UP (ref 1.8–7.4)
NEUTROPHILS # BLD AUTO: 6.08 K/UL — SIGNIFICANT CHANGE UP (ref 1.8–7.4)
NEUTROPHILS # BLD AUTO: 6.09 K/UL — SIGNIFICANT CHANGE UP (ref 1.8–7.4)
NEUTROPHILS # BLD AUTO: 6.17 K/UL — SIGNIFICANT CHANGE UP (ref 1.8–7.4)
NEUTROPHILS # BLD AUTO: 6.23 K/UL — SIGNIFICANT CHANGE UP (ref 1.8–7.4)
NEUTROPHILS # BLD AUTO: 6.24 K/UL — SIGNIFICANT CHANGE UP (ref 1.8–7.4)
NEUTROPHILS # BLD AUTO: 6.35 K/UL — SIGNIFICANT CHANGE UP (ref 1.8–7.4)
NEUTROPHILS # BLD AUTO: 6.36 K/UL — SIGNIFICANT CHANGE UP (ref 1.8–7.4)
NEUTROPHILS # BLD AUTO: 6.61 K/UL — SIGNIFICANT CHANGE UP (ref 1.8–7.4)
NEUTROPHILS # BLD AUTO: 6.67 K/UL — SIGNIFICANT CHANGE UP (ref 1.8–7.4)
NEUTROPHILS # BLD AUTO: 6.75 K/UL — SIGNIFICANT CHANGE UP (ref 1.8–7.4)
NEUTROPHILS # BLD AUTO: 6.8 K/UL — SIGNIFICANT CHANGE UP (ref 1.8–7.4)
NEUTROPHILS # BLD AUTO: 6.83 K/UL — SIGNIFICANT CHANGE UP (ref 1.8–7.4)
NEUTROPHILS # BLD AUTO: 6.83 K/UL — SIGNIFICANT CHANGE UP (ref 1.8–7.4)
NEUTROPHILS # BLD AUTO: 7.02 K/UL — SIGNIFICANT CHANGE UP (ref 1.8–7.4)
NEUTROPHILS # BLD AUTO: 7.23 K/UL — SIGNIFICANT CHANGE UP (ref 1.8–7.4)
NEUTROPHILS # BLD AUTO: 7.39 K/UL — SIGNIFICANT CHANGE UP (ref 1.8–7.4)
NEUTROPHILS # BLD AUTO: 7.91 K/UL — HIGH (ref 1.8–7.4)
NEUTROPHILS # BLD AUTO: 8.29 K/UL — HIGH (ref 1.8–7.4)
NEUTROPHILS # BLD AUTO: 8.44 K/UL — HIGH (ref 1.8–7.4)
NEUTROPHILS # BLD AUTO: 9.16 K/UL — HIGH (ref 1.8–7.4)
NEUTROPHILS NFR BLD AUTO: 74.8 % — SIGNIFICANT CHANGE UP (ref 43–77)
NEUTROPHILS NFR BLD AUTO: 75.9 % — SIGNIFICANT CHANGE UP (ref 43–77)
NEUTROPHILS NFR BLD AUTO: 77.5 % — HIGH (ref 43–77)
NEUTROPHILS NFR BLD AUTO: 78.6 % — HIGH (ref 43–77)
NEUTROPHILS NFR BLD AUTO: 79.6 % — HIGH (ref 43–77)
NEUTROPHILS NFR BLD AUTO: 79.7 % — HIGH (ref 43–77)
NEUTROPHILS NFR BLD AUTO: 79.8 % — HIGH (ref 43–77)
NEUTROPHILS NFR BLD AUTO: 80.1 % — HIGH (ref 43–77)
NEUTROPHILS NFR BLD AUTO: 80.5 % — HIGH (ref 43–77)
NEUTROPHILS NFR BLD AUTO: 80.7 % — HIGH (ref 43–77)
NEUTROPHILS NFR BLD AUTO: 81.2 % — HIGH (ref 43–77)
NEUTROPHILS NFR BLD AUTO: 82.1 % — HIGH (ref 43–77)
NEUTROPHILS NFR BLD AUTO: 82.3 % — HIGH (ref 43–77)
NEUTROPHILS NFR BLD AUTO: 82.7 % — HIGH (ref 43–77)
NEUTROPHILS NFR BLD AUTO: 82.8 % — HIGH (ref 43–77)
NEUTROPHILS NFR BLD AUTO: 82.9 % — HIGH (ref 43–77)
NEUTROPHILS NFR BLD AUTO: 83 % — HIGH (ref 43–77)
NEUTROPHILS NFR BLD AUTO: 83.1 % — HIGH (ref 43–77)
NEUTROPHILS NFR BLD AUTO: 83.3 % — HIGH (ref 43–77)
NEUTROPHILS NFR BLD AUTO: 83.3 % — HIGH (ref 43–77)
NEUTROPHILS NFR BLD AUTO: 84.1 % — HIGH (ref 43–77)
NEUTROPHILS NFR BLD AUTO: 84.1 % — HIGH (ref 43–77)
NEUTROPHILS NFR BLD AUTO: 84.3 % — HIGH (ref 43–77)
NEUTROPHILS NFR BLD AUTO: 84.3 % — HIGH (ref 43–77)
NEUTROPHILS NFR BLD AUTO: 84.5 % — HIGH (ref 43–77)
NEUTROPHILS NFR BLD AUTO: 85.1 % — HIGH (ref 43–77)
NEUTROPHILS NFR BLD AUTO: 85.2 % — HIGH (ref 43–77)
NEUTROPHILS NFR BLD AUTO: 85.6 % — HIGH (ref 43–77)
NEUTROPHILS NFR BLD AUTO: 85.8 % — HIGH (ref 43–77)
NEUTROPHILS NFR BLD AUTO: 86.2 % — HIGH (ref 43–77)
NEUTROPHILS NFR BLD AUTO: 86.5 % — HIGH (ref 43–77)
NEUTROPHILS NFR BLD AUTO: 86.9 % — HIGH (ref 43–77)
NEUTROPHILS NFR BLD AUTO: 87.4 % — HIGH (ref 43–77)
NEUTROPHILS NFR BLD AUTO: 88.5 % — HIGH (ref 43–77)
NEUTROPHILS NFR BLD AUTO: 88.7 % — HIGH (ref 43–77)
NEUTROPHILS NFR BLD AUTO: 90.3 % — HIGH (ref 43–77)
NEUTROPHILS NFR BLD AUTO: 91.3 % — HIGH (ref 43–77)
NEUTROPHILS NFR BLD AUTO: 93 % — HIGH (ref 43–77)
NEUTROPHILS-BODY FLUID: 19 % — SIGNIFICANT CHANGE UP
NEUTS BAND # BLD: 4.3 % — SIGNIFICANT CHANGE UP (ref 0–8)
NITRITE UR-MCNC: NEGATIVE — SIGNIFICANT CHANGE UP
NON-GYNECOLOGICAL CYTOLOGY STUDY: SIGNIFICANT CHANGE UP
OB PNL STL: POSITIVE
OVALOCYTES BLD QL SMEAR: SIGNIFICANT CHANGE UP
OVALOCYTES BLD QL SMEAR: SIGNIFICANT CHANGE UP
OVALOCYTES BLD QL SMEAR: SLIGHT — SIGNIFICANT CHANGE UP
PCO2 BLDA: 47 MMHG — SIGNIFICANT CHANGE UP (ref 35–48)
PH BLDA: 7.46 — HIGH (ref 7.35–7.45)
PH FLD: 7 — SIGNIFICANT CHANGE UP
PH UR: 6 — SIGNIFICANT CHANGE UP (ref 5–8)
PH UR: 8 — SIGNIFICANT CHANGE UP (ref 5–8)
PH UR: 8 — SIGNIFICANT CHANGE UP (ref 5–8)
PHOSPHATE SERPL-MCNC: 2.4 MG/DL — SIGNIFICANT CHANGE UP (ref 2.4–4.7)
PHOSPHATE SERPL-MCNC: 2.6 MG/DL — SIGNIFICANT CHANGE UP (ref 2.4–4.7)
PHOSPHATE SERPL-MCNC: 2.9 MG/DL — SIGNIFICANT CHANGE UP (ref 2.4–4.7)
PHOSPHATE SERPL-MCNC: 2.9 MG/DL — SIGNIFICANT CHANGE UP (ref 2.4–4.7)
PHOSPHATE SERPL-MCNC: 3.1 MG/DL — SIGNIFICANT CHANGE UP (ref 2.4–4.7)
PHOSPHATE SERPL-MCNC: 3.1 MG/DL — SIGNIFICANT CHANGE UP (ref 2.4–4.7)
PHOSPHATE SERPL-MCNC: 3.2 MG/DL — SIGNIFICANT CHANGE UP (ref 2.4–4.7)
PHOSPHATE SERPL-MCNC: 3.3 MG/DL — SIGNIFICANT CHANGE UP (ref 2.4–4.7)
PHOSPHATE SERPL-MCNC: 3.4 MG/DL — SIGNIFICANT CHANGE UP (ref 2.4–4.7)
PHOSPHATE SERPL-MCNC: 3.5 MG/DL — SIGNIFICANT CHANGE UP (ref 2.4–4.7)
PHOSPHATE SERPL-MCNC: 3.5 MG/DL — SIGNIFICANT CHANGE UP (ref 2.4–4.7)
PHOSPHATE SERPL-MCNC: 3.6 MG/DL — SIGNIFICANT CHANGE UP (ref 2.4–4.7)
PHOSPHATE SERPL-MCNC: 3.7 MG/DL — SIGNIFICANT CHANGE UP (ref 2.4–4.7)
PHOSPHATE SERPL-MCNC: 3.8 MG/DL — SIGNIFICANT CHANGE UP (ref 2.4–4.7)
PHOSPHATE SERPL-MCNC: 3.9 MG/DL — SIGNIFICANT CHANGE UP (ref 2.4–4.7)
PHOSPHATE SERPL-MCNC: 4 MG/DL — SIGNIFICANT CHANGE UP (ref 2.4–4.7)
PHOSPHATE SERPL-MCNC: 4 MG/DL — SIGNIFICANT CHANGE UP (ref 2.4–4.7)
PHOSPHATE SERPL-MCNC: 4.1 MG/DL — SIGNIFICANT CHANGE UP (ref 2.4–4.7)
PHOSPHATE SERPL-MCNC: 4.1 MG/DL — SIGNIFICANT CHANGE UP (ref 2.4–4.7)
PHOSPHATE SERPL-MCNC: 4.2 MG/DL — SIGNIFICANT CHANGE UP (ref 2.4–4.7)
PHOSPHATE SERPL-MCNC: 4.3 MG/DL — SIGNIFICANT CHANGE UP (ref 2.4–4.7)
PHOSPHATE SERPL-MCNC: 4.4 MG/DL — SIGNIFICANT CHANGE UP (ref 2.4–4.7)
PHOSPHATE SERPL-MCNC: 4.5 MG/DL — SIGNIFICANT CHANGE UP (ref 2.4–4.7)
PHOSPHATE SERPL-MCNC: 4.5 MG/DL — SIGNIFICANT CHANGE UP (ref 2.4–4.7)
PHOSPHATE SERPL-MCNC: 4.6 MG/DL — SIGNIFICANT CHANGE UP (ref 2.4–4.7)
PHOSPHATE SERPL-MCNC: 4.7 MG/DL — SIGNIFICANT CHANGE UP (ref 2.4–4.7)
PHOSPHATE SERPL-MCNC: 4.7 MG/DL — SIGNIFICANT CHANGE UP (ref 2.4–4.7)
PHOSPHATE SERPL-MCNC: 5 MG/DL — HIGH (ref 2.4–4.7)
PHOSPHATE SERPL-MCNC: 5.6 MG/DL — HIGH (ref 2.4–4.7)
PHOSPHATE SERPL-MCNC: 6.6 MG/DL — HIGH (ref 2.4–4.7)
PHOSPHATE SERPL-MCNC: 6.6 MG/DL — HIGH (ref 2.4–4.7)
PHOSPHATE SERPL-MCNC: 7.8 MG/DL — HIGH (ref 2.4–4.7)
PLAT MORPH BLD: NORMAL — SIGNIFICANT CHANGE UP
PLATELET # BLD AUTO: 130 K/UL — LOW (ref 150–400)
PLATELET # BLD AUTO: 135 K/UL — LOW (ref 150–400)
PLATELET # BLD AUTO: 149 K/UL — LOW (ref 150–400)
PLATELET # BLD AUTO: 150 K/UL — SIGNIFICANT CHANGE UP (ref 150–400)
PLATELET # BLD AUTO: 156 K/UL — SIGNIFICANT CHANGE UP (ref 150–400)
PLATELET # BLD AUTO: 162 K/UL — SIGNIFICANT CHANGE UP (ref 150–400)
PLATELET # BLD AUTO: 162 K/UL — SIGNIFICANT CHANGE UP (ref 150–400)
PLATELET # BLD AUTO: 164 K/UL — SIGNIFICANT CHANGE UP (ref 150–400)
PLATELET # BLD AUTO: 164 K/UL — SIGNIFICANT CHANGE UP (ref 150–400)
PLATELET # BLD AUTO: 165 K/UL — SIGNIFICANT CHANGE UP (ref 150–400)
PLATELET # BLD AUTO: 167 K/UL — SIGNIFICANT CHANGE UP (ref 150–400)
PLATELET # BLD AUTO: 169 K/UL — SIGNIFICANT CHANGE UP (ref 150–400)
PLATELET # BLD AUTO: 169 K/UL — SIGNIFICANT CHANGE UP (ref 150–400)
PLATELET # BLD AUTO: 174 K/UL — SIGNIFICANT CHANGE UP (ref 150–400)
PLATELET # BLD AUTO: 175 K/UL — SIGNIFICANT CHANGE UP (ref 150–400)
PLATELET # BLD AUTO: 178 K/UL — SIGNIFICANT CHANGE UP (ref 150–400)
PLATELET # BLD AUTO: 178 K/UL — SIGNIFICANT CHANGE UP (ref 150–400)
PLATELET # BLD AUTO: 182 K/UL — SIGNIFICANT CHANGE UP (ref 150–400)
PLATELET # BLD AUTO: 183 K/UL — SIGNIFICANT CHANGE UP (ref 150–400)
PLATELET # BLD AUTO: 187 K/UL — SIGNIFICANT CHANGE UP (ref 150–400)
PLATELET # BLD AUTO: 191 K/UL — SIGNIFICANT CHANGE UP (ref 150–400)
PLATELET # BLD AUTO: 193 K/UL — SIGNIFICANT CHANGE UP (ref 150–400)
PLATELET # BLD AUTO: 195 K/UL — SIGNIFICANT CHANGE UP (ref 150–400)
PLATELET # BLD AUTO: 197 K/UL — SIGNIFICANT CHANGE UP (ref 150–400)
PLATELET # BLD AUTO: 199 K/UL — SIGNIFICANT CHANGE UP (ref 150–400)
PLATELET # BLD AUTO: 199 K/UL — SIGNIFICANT CHANGE UP (ref 150–400)
PLATELET # BLD AUTO: 200 K/UL — SIGNIFICANT CHANGE UP (ref 150–400)
PLATELET # BLD AUTO: 205 K/UL — SIGNIFICANT CHANGE UP (ref 150–400)
PLATELET # BLD AUTO: 207 K/UL — SIGNIFICANT CHANGE UP (ref 150–400)
PLATELET # BLD AUTO: 209 K/UL — SIGNIFICANT CHANGE UP (ref 150–400)
PLATELET # BLD AUTO: 211 K/UL — SIGNIFICANT CHANGE UP (ref 150–400)
PLATELET # BLD AUTO: 211 K/UL — SIGNIFICANT CHANGE UP (ref 150–400)
PLATELET # BLD AUTO: 212 K/UL — SIGNIFICANT CHANGE UP (ref 150–400)
PLATELET # BLD AUTO: 214 K/UL — SIGNIFICANT CHANGE UP (ref 150–400)
PLATELET # BLD AUTO: 215 K/UL — SIGNIFICANT CHANGE UP (ref 150–400)
PLATELET # BLD AUTO: 222 K/UL — SIGNIFICANT CHANGE UP (ref 150–400)
PLATELET # BLD AUTO: 224 K/UL — SIGNIFICANT CHANGE UP (ref 150–400)
PLATELET # BLD AUTO: 235 K/UL — SIGNIFICANT CHANGE UP (ref 150–400)
PLATELET # BLD AUTO: 249 K/UL — SIGNIFICANT CHANGE UP (ref 150–400)
PLATELET # BLD AUTO: 249 K/UL — SIGNIFICANT CHANGE UP (ref 150–400)
PLATELET # BLD AUTO: 250 K/UL — SIGNIFICANT CHANGE UP (ref 150–400)
PLATELET # BLD AUTO: 252 K/UL — SIGNIFICANT CHANGE UP (ref 150–400)
PLATELET # BLD AUTO: 254 K/UL — SIGNIFICANT CHANGE UP (ref 150–400)
PLATELET # BLD AUTO: 254 K/UL — SIGNIFICANT CHANGE UP (ref 150–400)
PLATELET # BLD AUTO: 256 K/UL — SIGNIFICANT CHANGE UP (ref 150–400)
PLATELET # BLD AUTO: 267 K/UL — SIGNIFICANT CHANGE UP (ref 150–400)
PLATELET # BLD AUTO: 276 K/UL — SIGNIFICANT CHANGE UP (ref 150–400)
PLATELET # BLD AUTO: 278 K/UL — SIGNIFICANT CHANGE UP (ref 150–400)
PLATELET # BLD AUTO: 279 K/UL — SIGNIFICANT CHANGE UP (ref 150–400)
PLATELET # BLD AUTO: 290 K/UL — SIGNIFICANT CHANGE UP (ref 150–400)
PLATELET # BLD AUTO: 293 K/UL — SIGNIFICANT CHANGE UP (ref 150–400)
PLATELET # BLD AUTO: 294 K/UL — SIGNIFICANT CHANGE UP (ref 150–400)
PLATELET # BLD AUTO: 295 K/UL — SIGNIFICANT CHANGE UP (ref 150–400)
PLATELET # BLD AUTO: 297 K/UL — SIGNIFICANT CHANGE UP (ref 150–400)
PLATELET # BLD AUTO: 297 K/UL — SIGNIFICANT CHANGE UP (ref 150–400)
PLATELET # BLD AUTO: 302 K/UL — SIGNIFICANT CHANGE UP (ref 150–400)
PLATELET # BLD AUTO: 311 K/UL — SIGNIFICANT CHANGE UP (ref 150–400)
PLATELET # BLD AUTO: 313 K/UL — SIGNIFICANT CHANGE UP (ref 150–400)
PLATELET # BLD AUTO: 326 K/UL — SIGNIFICANT CHANGE UP (ref 150–400)
PLATELET # BLD AUTO: 326 K/UL — SIGNIFICANT CHANGE UP (ref 150–400)
PLATELET # BLD AUTO: 327 K/UL — SIGNIFICANT CHANGE UP (ref 150–400)
PLATELET # BLD AUTO: 346 K/UL — SIGNIFICANT CHANGE UP (ref 150–400)
PLATELET # BLD AUTO: 349 K/UL — SIGNIFICANT CHANGE UP (ref 150–400)
PLATELET # BLD AUTO: 349 K/UL — SIGNIFICANT CHANGE UP (ref 150–400)
PLATELET # BLD AUTO: 354 K/UL — SIGNIFICANT CHANGE UP (ref 150–400)
PLATELET # BLD AUTO: 368 K/UL — SIGNIFICANT CHANGE UP (ref 150–400)
PLATELET # BLD AUTO: 381 K/UL — SIGNIFICANT CHANGE UP (ref 150–400)
PLATELET # BLD AUTO: 411 K/UL — HIGH (ref 150–400)
PLATELET # BLD AUTO: 413 K/UL — HIGH (ref 150–400)
PLATELET # BLD AUTO: 437 K/UL — HIGH (ref 150–400)
PLATELET # BLD AUTO: 450 K/UL — HIGH (ref 150–400)
PO2 BLDA: 183 MMHG — HIGH (ref 83–108)
POIKILOCYTOSIS BLD QL AUTO: SIGNIFICANT CHANGE UP
POIKILOCYTOSIS BLD QL AUTO: SLIGHT — SIGNIFICANT CHANGE UP
POLYCHROMASIA BLD QL SMEAR: SIGNIFICANT CHANGE UP
POLYCHROMASIA BLD QL SMEAR: SLIGHT — SIGNIFICANT CHANGE UP
POTASSIUM SERPL-MCNC: 3.5 MMOL/L — SIGNIFICANT CHANGE UP (ref 3.5–5.3)
POTASSIUM SERPL-MCNC: 3.6 MMOL/L — SIGNIFICANT CHANGE UP (ref 3.5–5.3)
POTASSIUM SERPL-MCNC: 3.7 MMOL/L — SIGNIFICANT CHANGE UP (ref 3.5–5.3)
POTASSIUM SERPL-MCNC: 3.7 MMOL/L — SIGNIFICANT CHANGE UP (ref 3.5–5.3)
POTASSIUM SERPL-MCNC: 3.8 MMOL/L — SIGNIFICANT CHANGE UP (ref 3.5–5.3)
POTASSIUM SERPL-MCNC: 3.9 MMOL/L — SIGNIFICANT CHANGE UP (ref 3.5–5.3)
POTASSIUM SERPL-MCNC: 4 MMOL/L — SIGNIFICANT CHANGE UP (ref 3.5–5.3)
POTASSIUM SERPL-MCNC: 4.1 MMOL/L — SIGNIFICANT CHANGE UP (ref 3.5–5.3)
POTASSIUM SERPL-MCNC: 4.1 MMOL/L — SIGNIFICANT CHANGE UP (ref 3.5–5.3)
POTASSIUM SERPL-MCNC: 4.2 MMOL/L — SIGNIFICANT CHANGE UP (ref 3.5–5.3)
POTASSIUM SERPL-MCNC: 4.2 MMOL/L — SIGNIFICANT CHANGE UP (ref 3.5–5.3)
POTASSIUM SERPL-MCNC: 4.3 MMOL/L — SIGNIFICANT CHANGE UP (ref 3.5–5.3)
POTASSIUM SERPL-MCNC: 4.4 MMOL/L — SIGNIFICANT CHANGE UP (ref 3.5–5.3)
POTASSIUM SERPL-MCNC: 4.5 MMOL/L — SIGNIFICANT CHANGE UP (ref 3.5–5.3)
POTASSIUM SERPL-MCNC: 4.6 MMOL/L — SIGNIFICANT CHANGE UP (ref 3.5–5.3)
POTASSIUM SERPL-MCNC: 4.7 MMOL/L — SIGNIFICANT CHANGE UP (ref 3.5–5.3)
POTASSIUM SERPL-MCNC: 4.8 MMOL/L — SIGNIFICANT CHANGE UP (ref 3.5–5.3)
POTASSIUM SERPL-MCNC: 4.9 MMOL/L — SIGNIFICANT CHANGE UP (ref 3.5–5.3)
POTASSIUM SERPL-MCNC: 5 MMOL/L — SIGNIFICANT CHANGE UP (ref 3.5–5.3)
POTASSIUM SERPL-MCNC: 5.1 MMOL/L — SIGNIFICANT CHANGE UP (ref 3.5–5.3)
POTASSIUM SERPL-MCNC: 5.3 MMOL/L — SIGNIFICANT CHANGE UP (ref 3.5–5.3)
POTASSIUM SERPL-MCNC: 5.4 MMOL/L — HIGH (ref 3.5–5.3)
POTASSIUM SERPL-MCNC: 5.6 MMOL/L — HIGH (ref 3.5–5.3)
POTASSIUM SERPL-SCNC: 3.5 MMOL/L — SIGNIFICANT CHANGE UP (ref 3.5–5.3)
POTASSIUM SERPL-SCNC: 3.6 MMOL/L — SIGNIFICANT CHANGE UP (ref 3.5–5.3)
POTASSIUM SERPL-SCNC: 3.7 MMOL/L — SIGNIFICANT CHANGE UP (ref 3.5–5.3)
POTASSIUM SERPL-SCNC: 3.7 MMOL/L — SIGNIFICANT CHANGE UP (ref 3.5–5.3)
POTASSIUM SERPL-SCNC: 3.8 MMOL/L — SIGNIFICANT CHANGE UP (ref 3.5–5.3)
POTASSIUM SERPL-SCNC: 3.9 MMOL/L — SIGNIFICANT CHANGE UP (ref 3.5–5.3)
POTASSIUM SERPL-SCNC: 4 MMOL/L — SIGNIFICANT CHANGE UP (ref 3.5–5.3)
POTASSIUM SERPL-SCNC: 4.1 MMOL/L — SIGNIFICANT CHANGE UP (ref 3.5–5.3)
POTASSIUM SERPL-SCNC: 4.1 MMOL/L — SIGNIFICANT CHANGE UP (ref 3.5–5.3)
POTASSIUM SERPL-SCNC: 4.2 MMOL/L — SIGNIFICANT CHANGE UP (ref 3.5–5.3)
POTASSIUM SERPL-SCNC: 4.2 MMOL/L — SIGNIFICANT CHANGE UP (ref 3.5–5.3)
POTASSIUM SERPL-SCNC: 4.3 MMOL/L — SIGNIFICANT CHANGE UP (ref 3.5–5.3)
POTASSIUM SERPL-SCNC: 4.4 MMOL/L — SIGNIFICANT CHANGE UP (ref 3.5–5.3)
POTASSIUM SERPL-SCNC: 4.5 MMOL/L — SIGNIFICANT CHANGE UP (ref 3.5–5.3)
POTASSIUM SERPL-SCNC: 4.6 MMOL/L — SIGNIFICANT CHANGE UP (ref 3.5–5.3)
POTASSIUM SERPL-SCNC: 4.7 MMOL/L — SIGNIFICANT CHANGE UP (ref 3.5–5.3)
POTASSIUM SERPL-SCNC: 4.8 MMOL/L — SIGNIFICANT CHANGE UP (ref 3.5–5.3)
POTASSIUM SERPL-SCNC: 4.9 MMOL/L — SIGNIFICANT CHANGE UP (ref 3.5–5.3)
POTASSIUM SERPL-SCNC: 5 MMOL/L — SIGNIFICANT CHANGE UP (ref 3.5–5.3)
POTASSIUM SERPL-SCNC: 5.1 MMOL/L — SIGNIFICANT CHANGE UP (ref 3.5–5.3)
POTASSIUM SERPL-SCNC: 5.3 MMOL/L — SIGNIFICANT CHANGE UP (ref 3.5–5.3)
POTASSIUM SERPL-SCNC: 5.4 MMOL/L — HIGH (ref 3.5–5.3)
POTASSIUM SERPL-SCNC: 5.6 MMOL/L — HIGH (ref 3.5–5.3)
PREALB SERPL-MCNC: 10 MG/DL — LOW (ref 18–38)
PROCALCITONIN SERPL-MCNC: 0.39 NG/ML — HIGH (ref 0.02–0.1)
PROCALCITONIN SERPL-MCNC: 0.79 NG/ML — HIGH (ref 0.02–0.1)
PROCALCITONIN SERPL-MCNC: 0.79 NG/ML — HIGH (ref 0.02–0.1)
PROCALCITONIN SERPL-MCNC: 1.36 NG/ML — HIGH (ref 0.02–0.1)
PROCALCITONIN SERPL-MCNC: 1.37 NG/ML — HIGH (ref 0.02–0.1)
PROCALCITONIN SERPL-MCNC: 1.41 NG/ML — HIGH (ref 0.02–0.1)
PROMYELOCYTES # FLD: 0.9 % — HIGH (ref 0–0)
PROT FLD-MCNC: 2.5 G/DL — SIGNIFICANT CHANGE UP
PROT SERPL-MCNC: 4.9 G/DL — LOW (ref 6.6–8.7)
PROT SERPL-MCNC: 4.9 G/DL — LOW (ref 6.6–8.7)
PROT SERPL-MCNC: 5.1 G/DL — LOW (ref 6.6–8.7)
PROT SERPL-MCNC: 5.6 G/DL — LOW (ref 6.6–8.7)
PROT SERPL-MCNC: 5.8 G/DL — LOW (ref 6.6–8.7)
PROT SERPL-MCNC: 5.9 G/DL — LOW (ref 6.6–8.7)
PROT SERPL-MCNC: 6.1 G/DL — LOW (ref 6.6–8.7)
PROT SERPL-MCNC: 6.1 G/DL — LOW (ref 6.6–8.7)
PROT SERPL-MCNC: 6.2 G/DL — LOW (ref 6.6–8.7)
PROT SERPL-MCNC: 6.3 G/DL — LOW (ref 6.6–8.7)
PROT SERPL-MCNC: 6.3 G/DL — LOW (ref 6.6–8.7)
PROT SERPL-MCNC: 6.4 G/DL — LOW (ref 6.6–8.7)
PROT SERPL-MCNC: 6.9 G/DL — SIGNIFICANT CHANGE UP (ref 6.6–8.7)
PROT SERPL-MCNC: 7 G/DL — SIGNIFICANT CHANGE UP (ref 6.6–8.7)
PROT SERPL-MCNC: 7.1 G/DL — SIGNIFICANT CHANGE UP (ref 6.6–8.7)
PROT SERPL-MCNC: 7.2 G/DL — SIGNIFICANT CHANGE UP (ref 6.6–8.7)
PROT UR-MCNC: 100
PROTHROM AB SERPL-ACNC: 13.3 SEC — SIGNIFICANT CHANGE UP (ref 10.5–13.4)
PROTHROM AB SERPL-ACNC: 13.6 SEC — HIGH (ref 10.5–13.4)
PROTHROM AB SERPL-ACNC: 13.8 SEC — HIGH (ref 10.5–13.4)
PROTHROM AB SERPL-ACNC: 13.9 SEC — HIGH (ref 10.5–13.4)
PROTHROM AB SERPL-ACNC: 14.4 SEC — HIGH (ref 10.5–13.4)
PROTHROM AB SERPL-ACNC: 14.5 SEC — HIGH (ref 10.5–13.4)
PROTHROM AB SERPL-ACNC: 14.5 SEC — HIGH (ref 10.5–13.4)
PROTHROM AB SERPL-ACNC: 14.7 SEC — HIGH (ref 10.5–13.4)
PROTHROM AB SERPL-ACNC: 14.9 SEC — HIGH (ref 10.5–13.4)
PROTHROM AB SERPL-ACNC: 15 SEC — HIGH (ref 10.5–13.4)
PROTHROM AB SERPL-ACNC: 15.5 SEC — HIGH (ref 10.5–13.4)
PROTHROM AB SERPL-ACNC: 16.2 SEC — HIGH (ref 10.5–13.4)
PROTHROM AB SERPL-ACNC: 16.6 SEC — HIGH (ref 10.5–13.4)
PROTHROM AB SERPL-ACNC: 20.6 SEC — HIGH (ref 10.5–13.4)
PROTHROM AB SERPL-ACNC: 21.7 SEC — HIGH (ref 10.5–13.4)
RAPID RVP RESULT: SIGNIFICANT CHANGE UP
RAPID RVP RESULT: SIGNIFICANT CHANGE UP
RBC # BLD: 1.98 M/UL — LOW (ref 4.2–5.8)
RBC # BLD: 2.37 M/UL — LOW (ref 4.2–5.8)
RBC # BLD: 2.43 M/UL — LOW (ref 4.2–5.8)
RBC # BLD: 2.51 M/UL — LOW (ref 4.2–5.8)
RBC # BLD: 2.53 M/UL — LOW (ref 4.2–5.8)
RBC # BLD: 2.54 M/UL — LOW (ref 4.2–5.8)
RBC # BLD: 2.6 M/UL — LOW (ref 4.2–5.8)
RBC # BLD: 2.61 M/UL — LOW (ref 4.2–5.8)
RBC # BLD: 2.62 M/UL — LOW (ref 4.2–5.8)
RBC # BLD: 2.62 M/UL — LOW (ref 4.2–5.8)
RBC # BLD: 2.67 M/UL — LOW (ref 4.2–5.8)
RBC # BLD: 2.7 M/UL — LOW (ref 4.2–5.8)
RBC # BLD: 2.72 M/UL — LOW (ref 4.2–5.8)
RBC # BLD: 2.73 M/UL — LOW (ref 4.2–5.8)
RBC # BLD: 2.75 M/UL — LOW (ref 4.2–5.8)
RBC # BLD: 2.75 M/UL — LOW (ref 4.2–5.8)
RBC # BLD: 2.76 M/UL — LOW (ref 4.2–5.8)
RBC # BLD: 2.78 M/UL — LOW (ref 4.2–5.8)
RBC # BLD: 2.79 M/UL — LOW (ref 4.2–5.8)
RBC # BLD: 2.82 M/UL — LOW (ref 4.2–5.8)
RBC # BLD: 2.82 M/UL — LOW (ref 4.2–5.8)
RBC # BLD: 2.83 M/UL — LOW (ref 4.2–5.8)
RBC # BLD: 2.88 M/UL — LOW (ref 4.2–5.8)
RBC # BLD: 2.89 M/UL — LOW (ref 4.2–5.8)
RBC # BLD: 2.91 M/UL — LOW (ref 4.2–5.8)
RBC # BLD: 2.91 M/UL — LOW (ref 4.2–5.8)
RBC # BLD: 2.95 M/UL — LOW (ref 4.2–5.8)
RBC # BLD: 2.96 M/UL — LOW (ref 4.2–5.8)
RBC # BLD: 2.98 M/UL — LOW (ref 4.2–5.8)
RBC # BLD: 2.98 M/UL — LOW (ref 4.2–5.8)
RBC # BLD: 3.01 M/UL — LOW (ref 4.2–5.8)
RBC # BLD: 3.02 M/UL — LOW (ref 4.2–5.8)
RBC # BLD: 3.03 M/UL — LOW (ref 4.2–5.8)
RBC # BLD: 3.05 M/UL — LOW (ref 4.2–5.8)
RBC # BLD: 3.07 M/UL — LOW (ref 4.2–5.8)
RBC # BLD: 3.09 M/UL — LOW (ref 4.2–5.8)
RBC # BLD: 3.1 M/UL — LOW (ref 4.2–5.8)
RBC # BLD: 3.11 M/UL — LOW (ref 4.2–5.8)
RBC # BLD: 3.13 M/UL — LOW (ref 4.2–5.8)
RBC # BLD: 3.13 M/UL — LOW (ref 4.2–5.8)
RBC # BLD: 3.14 M/UL — LOW (ref 4.2–5.8)
RBC # BLD: 3.15 M/UL — LOW (ref 4.2–5.8)
RBC # BLD: 3.15 M/UL — LOW (ref 4.2–5.8)
RBC # BLD: 3.16 M/UL — LOW (ref 4.2–5.8)
RBC # BLD: 3.17 M/UL — LOW (ref 4.2–5.8)
RBC # BLD: 3.2 M/UL — LOW (ref 4.2–5.8)
RBC # BLD: 3.2 M/UL — LOW (ref 4.2–5.8)
RBC # BLD: 3.22 M/UL — LOW (ref 4.2–5.8)
RBC # BLD: 3.23 M/UL — LOW (ref 4.2–5.8)
RBC # BLD: 3.24 M/UL — LOW (ref 4.2–5.8)
RBC # BLD: 3.25 M/UL — LOW (ref 4.2–5.8)
RBC # BLD: 3.27 M/UL — LOW (ref 4.2–5.8)
RBC # BLD: 3.29 M/UL — LOW (ref 4.2–5.8)
RBC # BLD: 3.31 M/UL — LOW (ref 4.2–5.8)
RBC # BLD: 3.33 M/UL — LOW (ref 4.2–5.8)
RBC # BLD: 3.34 M/UL — LOW (ref 4.2–5.8)
RBC # BLD: 3.35 M/UL — LOW (ref 4.2–5.8)
RBC # BLD: 3.35 M/UL — LOW (ref 4.2–5.8)
RBC # BLD: 3.4 M/UL — LOW (ref 4.2–5.8)
RBC # BLD: 3.42 M/UL — LOW (ref 4.2–5.8)
RBC # BLD: 3.42 M/UL — LOW (ref 4.2–5.8)
RBC # BLD: 3.54 M/UL — LOW (ref 4.2–5.8)
RBC # BLD: 3.54 M/UL — LOW (ref 4.2–5.8)
RBC # BLD: 3.57 M/UL — LOW (ref 4.2–5.8)
RBC # BLD: 3.58 M/UL — LOW (ref 4.2–5.8)
RBC # BLD: 3.69 M/UL — LOW (ref 4.2–5.8)
RBC # BLD: 3.7 M/UL — LOW (ref 4.2–5.8)
RBC # BLD: 3.77 M/UL — LOW (ref 4.2–5.8)
RBC # BLD: 3.79 M/UL — LOW (ref 4.2–5.8)
RBC # FLD: 17.1 % — HIGH (ref 10.3–14.5)
RBC # FLD: 17.1 % — HIGH (ref 10.3–14.5)
RBC # FLD: 17.2 % — HIGH (ref 10.3–14.5)
RBC # FLD: 17.3 % — HIGH (ref 10.3–14.5)
RBC # FLD: 17.3 % — HIGH (ref 10.3–14.5)
RBC # FLD: 17.4 % — HIGH (ref 10.3–14.5)
RBC # FLD: 17.5 % — HIGH (ref 10.3–14.5)
RBC # FLD: 17.5 % — HIGH (ref 10.3–14.5)
RBC # FLD: 17.6 % — HIGH (ref 10.3–14.5)
RBC # FLD: 17.7 % — HIGH (ref 10.3–14.5)
RBC # FLD: 17.9 % — HIGH (ref 10.3–14.5)
RBC # FLD: 18 % — HIGH (ref 10.3–14.5)
RBC # FLD: 18.1 % — HIGH (ref 10.3–14.5)
RBC # FLD: 18.2 % — HIGH (ref 10.3–14.5)
RBC # FLD: 18.2 % — HIGH (ref 10.3–14.5)
RBC # FLD: 18.5 % — HIGH (ref 10.3–14.5)
RBC # FLD: 18.6 % — HIGH (ref 10.3–14.5)
RBC # FLD: 18.8 % — HIGH (ref 10.3–14.5)
RBC # FLD: 18.9 % — HIGH (ref 10.3–14.5)
RBC # FLD: 18.9 % — HIGH (ref 10.3–14.5)
RBC # FLD: 19 % — HIGH (ref 10.3–14.5)
RBC # FLD: 19.1 % — HIGH (ref 10.3–14.5)
RBC # FLD: 19.2 % — HIGH (ref 10.3–14.5)
RBC # FLD: 19.3 % — HIGH (ref 10.3–14.5)
RBC # FLD: 19.4 % — HIGH (ref 10.3–14.5)
RBC # FLD: 19.4 % — HIGH (ref 10.3–14.5)
RBC # FLD: 19.5 % — HIGH (ref 10.3–14.5)
RBC # FLD: 19.7 % — HIGH (ref 10.3–14.5)
RBC # FLD: 19.8 % — HIGH (ref 10.3–14.5)
RBC # FLD: 19.9 % — HIGH (ref 10.3–14.5)
RBC # FLD: 20 % — HIGH (ref 10.3–14.5)
RBC # FLD: 20 % — HIGH (ref 10.3–14.5)
RBC # FLD: 20.1 % — HIGH (ref 10.3–14.5)
RBC # FLD: 20.2 % — HIGH (ref 10.3–14.5)
RBC # FLD: 20.3 % — HIGH (ref 10.3–14.5)
RBC # FLD: 20.5 % — HIGH (ref 10.3–14.5)
RBC # FLD: 20.6 % — HIGH (ref 10.3–14.5)
RBC # FLD: 20.7 % — HIGH (ref 10.3–14.5)
RBC # FLD: 20.8 % — HIGH (ref 10.3–14.5)
RBC # FLD: 20.8 % — HIGH (ref 10.3–14.5)
RBC # FLD: 21 % — HIGH (ref 10.3–14.5)
RBC # FLD: 21.1 % — HIGH (ref 10.3–14.5)
RBC # FLD: 21.2 % — HIGH (ref 10.3–14.5)
RBC # FLD: 21.4 % — HIGH (ref 10.3–14.5)
RBC # FLD: 21.4 % — HIGH (ref 10.3–14.5)
RBC BLD AUTO: ABNORMAL
RBC BLD AUTO: SIGNIFICANT CHANGE UP
RBC CASTS # UR COMP ASSIST: ABNORMAL /HPF (ref 0–4)
RBC CASTS # UR COMP ASSIST: SIGNIFICANT CHANGE UP /HPF (ref 0–4)
RBC CASTS # UR COMP ASSIST: SIGNIFICANT CHANGE UP /HPF (ref 0–4)
RCV VOL RI: <1000 /UL — HIGH (ref 0–0)
ROULEAUX BLD QL SMEAR: PRESENT
RSV RNA NPH QL NAA+NON-PROBE: SIGNIFICANT CHANGE UP
RSV RNA NPH QL NAA+NON-PROBE: SIGNIFICANT CHANGE UP
S AUREUS DNA NOSE QL NAA+PROBE: SIGNIFICANT CHANGE UP
SAO2 % BLDA: 99.8 % — HIGH (ref 94–98)
SARS-COV-2 RNA SPEC QL NAA+PROBE: SIGNIFICANT CHANGE UP
SCHISTOCYTES BLD QL AUTO: SLIGHT — SIGNIFICANT CHANGE UP
SODIUM SERPL-SCNC: 126 MMOL/L — LOW (ref 135–145)
SODIUM SERPL-SCNC: 131 MMOL/L — LOW (ref 135–145)
SODIUM SERPL-SCNC: 132 MMOL/L — LOW (ref 135–145)
SODIUM SERPL-SCNC: 133 MMOL/L — LOW (ref 135–145)
SODIUM SERPL-SCNC: 134 MMOL/L — LOW (ref 135–145)
SODIUM SERPL-SCNC: 135 MMOL/L — SIGNIFICANT CHANGE UP (ref 135–145)
SODIUM SERPL-SCNC: 136 MMOL/L — SIGNIFICANT CHANGE UP (ref 135–145)
SODIUM SERPL-SCNC: 137 MMOL/L — SIGNIFICANT CHANGE UP (ref 135–145)
SODIUM SERPL-SCNC: 138 MMOL/L — SIGNIFICANT CHANGE UP (ref 135–145)
SODIUM SERPL-SCNC: 139 MMOL/L — SIGNIFICANT CHANGE UP (ref 135–145)
SODIUM SERPL-SCNC: 140 MMOL/L — SIGNIFICANT CHANGE UP (ref 135–145)
SODIUM SERPL-SCNC: 141 MMOL/L — SIGNIFICANT CHANGE UP (ref 135–145)
SODIUM SERPL-SCNC: 142 MMOL/L — SIGNIFICANT CHANGE UP (ref 135–145)
SODIUM SERPL-SCNC: 143 MMOL/L — SIGNIFICANT CHANGE UP (ref 135–145)
SP GR SPEC: 1.01 — SIGNIFICANT CHANGE UP (ref 1.01–1.02)
SPECIMEN SOURCE: SIGNIFICANT CHANGE UP
TIBC SERPL-MCNC: 184 UG/DL — LOW (ref 220–430)
TOTAL NUCLEATED CELL COUNT, BODY FLUID: 203 /UL — SIGNIFICANT CHANGE UP
TRANSFERRIN SERPL-MCNC: 129 MG/DL — LOW (ref 180–329)
TSH SERPL-MCNC: 5.55 UIU/ML — HIGH (ref 0.27–4.2)
TUBE TYPE: SIGNIFICANT CHANGE UP
UROBILINOGEN FLD QL: NEGATIVE MG/DL — SIGNIFICANT CHANGE UP
VARIANT LYMPHS # BLD: 0.9 % — SIGNIFICANT CHANGE UP (ref 0–6)
WBC # BLD: 10.07 K/UL — SIGNIFICANT CHANGE UP (ref 3.8–10.5)
WBC # BLD: 10.18 K/UL — SIGNIFICANT CHANGE UP (ref 3.8–10.5)
WBC # BLD: 11.45 K/UL — HIGH (ref 3.8–10.5)
WBC # BLD: 11.66 K/UL — HIGH (ref 3.8–10.5)
WBC # BLD: 12.89 K/UL — HIGH (ref 3.8–10.5)
WBC # BLD: 14.69 K/UL — HIGH (ref 3.8–10.5)
WBC # BLD: 4.51 K/UL — SIGNIFICANT CHANGE UP (ref 3.8–10.5)
WBC # BLD: 4.68 K/UL — SIGNIFICANT CHANGE UP (ref 3.8–10.5)
WBC # BLD: 5 K/UL — SIGNIFICANT CHANGE UP (ref 3.8–10.5)
WBC # BLD: 5.13 K/UL — SIGNIFICANT CHANGE UP (ref 3.8–10.5)
WBC # BLD: 5.22 K/UL — SIGNIFICANT CHANGE UP (ref 3.8–10.5)
WBC # BLD: 5.32 K/UL — SIGNIFICANT CHANGE UP (ref 3.8–10.5)
WBC # BLD: 5.43 K/UL — SIGNIFICANT CHANGE UP (ref 3.8–10.5)
WBC # BLD: 5.47 K/UL — SIGNIFICANT CHANGE UP (ref 3.8–10.5)
WBC # BLD: 5.5 K/UL — SIGNIFICANT CHANGE UP (ref 3.8–10.5)
WBC # BLD: 5.55 K/UL — SIGNIFICANT CHANGE UP (ref 3.8–10.5)
WBC # BLD: 5.64 K/UL — SIGNIFICANT CHANGE UP (ref 3.8–10.5)
WBC # BLD: 5.73 K/UL — SIGNIFICANT CHANGE UP (ref 3.8–10.5)
WBC # BLD: 5.8 K/UL — SIGNIFICANT CHANGE UP (ref 3.8–10.5)
WBC # BLD: 5.85 K/UL — SIGNIFICANT CHANGE UP (ref 3.8–10.5)
WBC # BLD: 5.86 K/UL — SIGNIFICANT CHANGE UP (ref 3.8–10.5)
WBC # BLD: 5.88 K/UL — SIGNIFICANT CHANGE UP (ref 3.8–10.5)
WBC # BLD: 5.98 K/UL — SIGNIFICANT CHANGE UP (ref 3.8–10.5)
WBC # BLD: 6.08 K/UL — SIGNIFICANT CHANGE UP (ref 3.8–10.5)
WBC # BLD: 6.15 K/UL — SIGNIFICANT CHANGE UP (ref 3.8–10.5)
WBC # BLD: 6.21 K/UL — SIGNIFICANT CHANGE UP (ref 3.8–10.5)
WBC # BLD: 6.28 K/UL — SIGNIFICANT CHANGE UP (ref 3.8–10.5)
WBC # BLD: 6.31 K/UL — SIGNIFICANT CHANGE UP (ref 3.8–10.5)
WBC # BLD: 6.42 K/UL — SIGNIFICANT CHANGE UP (ref 3.8–10.5)
WBC # BLD: 6.45 K/UL — SIGNIFICANT CHANGE UP (ref 3.8–10.5)
WBC # BLD: 6.47 K/UL — SIGNIFICANT CHANGE UP (ref 3.8–10.5)
WBC # BLD: 6.5 K/UL — SIGNIFICANT CHANGE UP (ref 3.8–10.5)
WBC # BLD: 6.51 K/UL — SIGNIFICANT CHANGE UP (ref 3.8–10.5)
WBC # BLD: 6.52 K/UL — SIGNIFICANT CHANGE UP (ref 3.8–10.5)
WBC # BLD: 6.58 K/UL — SIGNIFICANT CHANGE UP (ref 3.8–10.5)
WBC # BLD: 6.58 K/UL — SIGNIFICANT CHANGE UP (ref 3.8–10.5)
WBC # BLD: 6.66 K/UL — SIGNIFICANT CHANGE UP (ref 3.8–10.5)
WBC # BLD: 6.7 K/UL — SIGNIFICANT CHANGE UP (ref 3.8–10.5)
WBC # BLD: 6.73 K/UL — SIGNIFICANT CHANGE UP (ref 3.8–10.5)
WBC # BLD: 6.78 K/UL — SIGNIFICANT CHANGE UP (ref 3.8–10.5)
WBC # BLD: 6.79 K/UL — SIGNIFICANT CHANGE UP (ref 3.8–10.5)
WBC # BLD: 6.79 K/UL — SIGNIFICANT CHANGE UP (ref 3.8–10.5)
WBC # BLD: 6.84 K/UL — SIGNIFICANT CHANGE UP (ref 3.8–10.5)
WBC # BLD: 6.9 K/UL — SIGNIFICANT CHANGE UP (ref 3.8–10.5)
WBC # BLD: 7.02 K/UL — SIGNIFICANT CHANGE UP (ref 3.8–10.5)
WBC # BLD: 7.12 K/UL — SIGNIFICANT CHANGE UP (ref 3.8–10.5)
WBC # BLD: 7.17 K/UL — SIGNIFICANT CHANGE UP (ref 3.8–10.5)
WBC # BLD: 7.2 K/UL — SIGNIFICANT CHANGE UP (ref 3.8–10.5)
WBC # BLD: 7.2 K/UL — SIGNIFICANT CHANGE UP (ref 3.8–10.5)
WBC # BLD: 7.32 K/UL — SIGNIFICANT CHANGE UP (ref 3.8–10.5)
WBC # BLD: 7.33 K/UL — SIGNIFICANT CHANGE UP (ref 3.8–10.5)
WBC # BLD: 7.34 K/UL — SIGNIFICANT CHANGE UP (ref 3.8–10.5)
WBC # BLD: 7.35 K/UL — SIGNIFICANT CHANGE UP (ref 3.8–10.5)
WBC # BLD: 7.41 K/UL — SIGNIFICANT CHANGE UP (ref 3.8–10.5)
WBC # BLD: 7.41 K/UL — SIGNIFICANT CHANGE UP (ref 3.8–10.5)
WBC # BLD: 7.42 K/UL — SIGNIFICANT CHANGE UP (ref 3.8–10.5)
WBC # BLD: 7.45 K/UL — SIGNIFICANT CHANGE UP (ref 3.8–10.5)
WBC # BLD: 7.56 K/UL — SIGNIFICANT CHANGE UP (ref 3.8–10.5)
WBC # BLD: 7.76 K/UL — SIGNIFICANT CHANGE UP (ref 3.8–10.5)
WBC # BLD: 7.81 K/UL — SIGNIFICANT CHANGE UP (ref 3.8–10.5)
WBC # BLD: 7.85 K/UL — SIGNIFICANT CHANGE UP (ref 3.8–10.5)
WBC # BLD: 7.91 K/UL — SIGNIFICANT CHANGE UP (ref 3.8–10.5)
WBC # BLD: 8.04 K/UL — SIGNIFICANT CHANGE UP (ref 3.8–10.5)
WBC # BLD: 8.07 K/UL — SIGNIFICANT CHANGE UP (ref 3.8–10.5)
WBC # BLD: 8.07 K/UL — SIGNIFICANT CHANGE UP (ref 3.8–10.5)
WBC # BLD: 8.41 K/UL — SIGNIFICANT CHANGE UP (ref 3.8–10.5)
WBC # BLD: 8.5 K/UL — SIGNIFICANT CHANGE UP (ref 3.8–10.5)
WBC # BLD: 8.5 K/UL — SIGNIFICANT CHANGE UP (ref 3.8–10.5)
WBC # BLD: 8.81 K/UL — SIGNIFICANT CHANGE UP (ref 3.8–10.5)
WBC # BLD: 8.9 K/UL — SIGNIFICANT CHANGE UP (ref 3.8–10.5)
WBC # BLD: 9.08 K/UL — SIGNIFICANT CHANGE UP (ref 3.8–10.5)
WBC # BLD: 9.53 K/UL — SIGNIFICANT CHANGE UP (ref 3.8–10.5)
WBC # BLD: 9.98 K/UL — SIGNIFICANT CHANGE UP (ref 3.8–10.5)
WBC # FLD AUTO: 10.07 K/UL — SIGNIFICANT CHANGE UP (ref 3.8–10.5)
WBC # FLD AUTO: 10.18 K/UL — SIGNIFICANT CHANGE UP (ref 3.8–10.5)
WBC # FLD AUTO: 11.45 K/UL — HIGH (ref 3.8–10.5)
WBC # FLD AUTO: 11.66 K/UL — HIGH (ref 3.8–10.5)
WBC # FLD AUTO: 12.89 K/UL — HIGH (ref 3.8–10.5)
WBC # FLD AUTO: 14.69 K/UL — HIGH (ref 3.8–10.5)
WBC # FLD AUTO: 4.51 K/UL — SIGNIFICANT CHANGE UP (ref 3.8–10.5)
WBC # FLD AUTO: 4.68 K/UL — SIGNIFICANT CHANGE UP (ref 3.8–10.5)
WBC # FLD AUTO: 5 K/UL — SIGNIFICANT CHANGE UP (ref 3.8–10.5)
WBC # FLD AUTO: 5.13 K/UL — SIGNIFICANT CHANGE UP (ref 3.8–10.5)
WBC # FLD AUTO: 5.22 K/UL — SIGNIFICANT CHANGE UP (ref 3.8–10.5)
WBC # FLD AUTO: 5.32 K/UL — SIGNIFICANT CHANGE UP (ref 3.8–10.5)
WBC # FLD AUTO: 5.43 K/UL — SIGNIFICANT CHANGE UP (ref 3.8–10.5)
WBC # FLD AUTO: 5.47 K/UL — SIGNIFICANT CHANGE UP (ref 3.8–10.5)
WBC # FLD AUTO: 5.5 K/UL — SIGNIFICANT CHANGE UP (ref 3.8–10.5)
WBC # FLD AUTO: 5.55 K/UL — SIGNIFICANT CHANGE UP (ref 3.8–10.5)
WBC # FLD AUTO: 5.64 K/UL — SIGNIFICANT CHANGE UP (ref 3.8–10.5)
WBC # FLD AUTO: 5.73 K/UL — SIGNIFICANT CHANGE UP (ref 3.8–10.5)
WBC # FLD AUTO: 5.8 K/UL — SIGNIFICANT CHANGE UP (ref 3.8–10.5)
WBC # FLD AUTO: 5.85 K/UL — SIGNIFICANT CHANGE UP (ref 3.8–10.5)
WBC # FLD AUTO: 5.86 K/UL — SIGNIFICANT CHANGE UP (ref 3.8–10.5)
WBC # FLD AUTO: 5.88 K/UL — SIGNIFICANT CHANGE UP (ref 3.8–10.5)
WBC # FLD AUTO: 5.98 K/UL — SIGNIFICANT CHANGE UP (ref 3.8–10.5)
WBC # FLD AUTO: 6.08 K/UL — SIGNIFICANT CHANGE UP (ref 3.8–10.5)
WBC # FLD AUTO: 6.15 K/UL — SIGNIFICANT CHANGE UP (ref 3.8–10.5)
WBC # FLD AUTO: 6.21 K/UL — SIGNIFICANT CHANGE UP (ref 3.8–10.5)
WBC # FLD AUTO: 6.28 K/UL — SIGNIFICANT CHANGE UP (ref 3.8–10.5)
WBC # FLD AUTO: 6.31 K/UL — SIGNIFICANT CHANGE UP (ref 3.8–10.5)
WBC # FLD AUTO: 6.42 K/UL — SIGNIFICANT CHANGE UP (ref 3.8–10.5)
WBC # FLD AUTO: 6.45 K/UL — SIGNIFICANT CHANGE UP (ref 3.8–10.5)
WBC # FLD AUTO: 6.47 K/UL — SIGNIFICANT CHANGE UP (ref 3.8–10.5)
WBC # FLD AUTO: 6.5 K/UL — SIGNIFICANT CHANGE UP (ref 3.8–10.5)
WBC # FLD AUTO: 6.51 K/UL — SIGNIFICANT CHANGE UP (ref 3.8–10.5)
WBC # FLD AUTO: 6.52 K/UL — SIGNIFICANT CHANGE UP (ref 3.8–10.5)
WBC # FLD AUTO: 6.58 K/UL — SIGNIFICANT CHANGE UP (ref 3.8–10.5)
WBC # FLD AUTO: 6.58 K/UL — SIGNIFICANT CHANGE UP (ref 3.8–10.5)
WBC # FLD AUTO: 6.66 K/UL — SIGNIFICANT CHANGE UP (ref 3.8–10.5)
WBC # FLD AUTO: 6.7 K/UL — SIGNIFICANT CHANGE UP (ref 3.8–10.5)
WBC # FLD AUTO: 6.73 K/UL — SIGNIFICANT CHANGE UP (ref 3.8–10.5)
WBC # FLD AUTO: 6.78 K/UL — SIGNIFICANT CHANGE UP (ref 3.8–10.5)
WBC # FLD AUTO: 6.79 K/UL — SIGNIFICANT CHANGE UP (ref 3.8–10.5)
WBC # FLD AUTO: 6.79 K/UL — SIGNIFICANT CHANGE UP (ref 3.8–10.5)
WBC # FLD AUTO: 6.84 K/UL — SIGNIFICANT CHANGE UP (ref 3.8–10.5)
WBC # FLD AUTO: 6.9 K/UL — SIGNIFICANT CHANGE UP (ref 3.8–10.5)
WBC # FLD AUTO: 7.02 K/UL — SIGNIFICANT CHANGE UP (ref 3.8–10.5)
WBC # FLD AUTO: 7.12 K/UL — SIGNIFICANT CHANGE UP (ref 3.8–10.5)
WBC # FLD AUTO: 7.17 K/UL — SIGNIFICANT CHANGE UP (ref 3.8–10.5)
WBC # FLD AUTO: 7.2 K/UL — SIGNIFICANT CHANGE UP (ref 3.8–10.5)
WBC # FLD AUTO: 7.2 K/UL — SIGNIFICANT CHANGE UP (ref 3.8–10.5)
WBC # FLD AUTO: 7.32 K/UL — SIGNIFICANT CHANGE UP (ref 3.8–10.5)
WBC # FLD AUTO: 7.33 K/UL — SIGNIFICANT CHANGE UP (ref 3.8–10.5)
WBC # FLD AUTO: 7.34 K/UL — SIGNIFICANT CHANGE UP (ref 3.8–10.5)
WBC # FLD AUTO: 7.35 K/UL — SIGNIFICANT CHANGE UP (ref 3.8–10.5)
WBC # FLD AUTO: 7.41 K/UL — SIGNIFICANT CHANGE UP (ref 3.8–10.5)
WBC # FLD AUTO: 7.41 K/UL — SIGNIFICANT CHANGE UP (ref 3.8–10.5)
WBC # FLD AUTO: 7.42 K/UL — SIGNIFICANT CHANGE UP (ref 3.8–10.5)
WBC # FLD AUTO: 7.45 K/UL — SIGNIFICANT CHANGE UP (ref 3.8–10.5)
WBC # FLD AUTO: 7.56 K/UL — SIGNIFICANT CHANGE UP (ref 3.8–10.5)
WBC # FLD AUTO: 7.76 K/UL — SIGNIFICANT CHANGE UP (ref 3.8–10.5)
WBC # FLD AUTO: 7.81 K/UL — SIGNIFICANT CHANGE UP (ref 3.8–10.5)
WBC # FLD AUTO: 7.85 K/UL — SIGNIFICANT CHANGE UP (ref 3.8–10.5)
WBC # FLD AUTO: 7.91 K/UL — SIGNIFICANT CHANGE UP (ref 3.8–10.5)
WBC # FLD AUTO: 8.04 K/UL — SIGNIFICANT CHANGE UP (ref 3.8–10.5)
WBC # FLD AUTO: 8.07 K/UL — SIGNIFICANT CHANGE UP (ref 3.8–10.5)
WBC # FLD AUTO: 8.07 K/UL — SIGNIFICANT CHANGE UP (ref 3.8–10.5)
WBC # FLD AUTO: 8.41 K/UL — SIGNIFICANT CHANGE UP (ref 3.8–10.5)
WBC # FLD AUTO: 8.5 K/UL — SIGNIFICANT CHANGE UP (ref 3.8–10.5)
WBC # FLD AUTO: 8.5 K/UL — SIGNIFICANT CHANGE UP (ref 3.8–10.5)
WBC # FLD AUTO: 8.81 K/UL — SIGNIFICANT CHANGE UP (ref 3.8–10.5)
WBC # FLD AUTO: 8.9 K/UL — SIGNIFICANT CHANGE UP (ref 3.8–10.5)
WBC # FLD AUTO: 9.08 K/UL — SIGNIFICANT CHANGE UP (ref 3.8–10.5)
WBC # FLD AUTO: 9.53 K/UL — SIGNIFICANT CHANGE UP (ref 3.8–10.5)
WBC # FLD AUTO: 9.98 K/UL — SIGNIFICANT CHANGE UP (ref 3.8–10.5)
WBC UR QL: ABNORMAL /HPF (ref 0–5)
WBC UR QL: NEGATIVE /HPF — SIGNIFICANT CHANGE UP (ref 0–5)
WBC UR QL: SIGNIFICANT CHANGE UP /HPF (ref 0–5)

## 2022-01-01 PROCEDURE — 99233 SBSQ HOSP IP/OBS HIGH 50: CPT

## 2022-01-01 PROCEDURE — 99285 EMERGENCY DEPT VISIT HI MDM: CPT

## 2022-01-01 PROCEDURE — 86850 RBC ANTIBODY SCREEN: CPT

## 2022-01-01 PROCEDURE — 97110 THERAPEUTIC EXERCISES: CPT

## 2022-01-01 PROCEDURE — 85025 COMPLETE CBC W/AUTO DIFF WBC: CPT

## 2022-01-01 PROCEDURE — 81001 URINALYSIS AUTO W/SCOPE: CPT

## 2022-01-01 PROCEDURE — 93970 EXTREMITY STUDY: CPT | Mod: 26

## 2022-01-01 PROCEDURE — 71045 X-RAY EXAM CHEST 1 VIEW: CPT | Mod: 26,77

## 2022-01-01 PROCEDURE — 99213 OFFICE O/P EST LOW 20 MIN: CPT

## 2022-01-01 PROCEDURE — 87640 STAPH A DNA AMP PROBE: CPT

## 2022-01-01 PROCEDURE — 85730 THROMBOPLASTIN TIME PARTIAL: CPT

## 2022-01-01 PROCEDURE — 90937 HEMODIALYSIS REPEATED EVAL: CPT

## 2022-01-01 PROCEDURE — 36569 INSJ PICC 5 YR+ W/O IMAGING: CPT

## 2022-01-01 PROCEDURE — 93970 EXTREMITY STUDY: CPT

## 2022-01-01 PROCEDURE — 99497 ADVNCD CARE PLAN 30 MIN: CPT | Mod: 25

## 2022-01-01 PROCEDURE — 83036 HEMOGLOBIN GLYCOSYLATED A1C: CPT

## 2022-01-01 PROCEDURE — C1889: CPT

## 2022-01-01 PROCEDURE — 96374 THER/PROPH/DIAG INJ IV PUSH: CPT

## 2022-01-01 PROCEDURE — P9040: CPT

## 2022-01-01 PROCEDURE — 82435 ASSAY OF BLOOD CHLORIDE: CPT

## 2022-01-01 PROCEDURE — 99221 1ST HOSP IP/OBS SF/LOW 40: CPT

## 2022-01-01 PROCEDURE — 77001 FLUOROGUIDE FOR VEIN DEVICE: CPT

## 2022-01-01 PROCEDURE — 86901 BLOOD TYPING SEROLOGIC RH(D): CPT

## 2022-01-01 PROCEDURE — 36415 COLL VENOUS BLD VENIPUNCTURE: CPT

## 2022-01-01 PROCEDURE — 86900 BLOOD TYPING SEROLOGIC ABO: CPT

## 2022-01-01 PROCEDURE — 93005 ELECTROCARDIOGRAM TRACING: CPT

## 2022-01-01 PROCEDURE — 73620 X-RAY EXAM OF FOOT: CPT | Mod: 26,LT

## 2022-01-01 PROCEDURE — 73700 CT LOWER EXTREMITY W/O DYE: CPT | Mod: 26,LT

## 2022-01-01 PROCEDURE — 99223 1ST HOSP IP/OBS HIGH 75: CPT | Mod: 25

## 2022-01-01 PROCEDURE — 27590 AMPUTATE LEG AT THIGH: CPT | Mod: LT

## 2022-01-01 PROCEDURE — 87340 HEPATITIS B SURFACE AG IA: CPT

## 2022-01-01 PROCEDURE — 97530 THERAPEUTIC ACTIVITIES: CPT

## 2022-01-01 PROCEDURE — 84100 ASSAY OF PHOSPHORUS: CPT

## 2022-01-01 PROCEDURE — 75710 ARTERY X-RAYS ARM/LEG: CPT | Mod: XU

## 2022-01-01 PROCEDURE — 86753 PROTOZOA ANTIBODY NOS: CPT

## 2022-01-01 PROCEDURE — 82803 BLOOD GASES ANY COMBINATION: CPT

## 2022-01-01 PROCEDURE — 85610 PROTHROMBIN TIME: CPT

## 2022-01-01 PROCEDURE — 82248 BILIRUBIN DIRECT: CPT

## 2022-01-01 PROCEDURE — 99223 1ST HOSP IP/OBS HIGH 75: CPT

## 2022-01-01 PROCEDURE — 80048 BASIC METABOLIC PNL TOTAL CA: CPT

## 2022-01-01 PROCEDURE — 85027 COMPLETE CBC AUTOMATED: CPT

## 2022-01-01 PROCEDURE — 80053 COMPREHEN METABOLIC PANEL: CPT

## 2022-01-01 PROCEDURE — 87641 MR-STAPH DNA AMP PROBE: CPT

## 2022-01-01 PROCEDURE — 84157 ASSAY OF PROTEIN OTHER: CPT

## 2022-01-01 PROCEDURE — P9059: CPT

## 2022-01-01 PROCEDURE — G2066: CPT

## 2022-01-01 PROCEDURE — 92610 EVALUATE SWALLOWING FUNCTION: CPT

## 2022-01-01 PROCEDURE — 71045 X-RAY EXAM CHEST 1 VIEW: CPT | Mod: 26

## 2022-01-01 PROCEDURE — XXXXX: CPT

## 2022-01-01 PROCEDURE — 76000 FLUOROSCOPY <1 HR PHYS/QHP: CPT

## 2022-01-01 PROCEDURE — 36556 INSERT NON-TUNNEL CV CATH: CPT

## 2022-01-01 PROCEDURE — 99232 SBSQ HOSP IP/OBS MODERATE 35: CPT

## 2022-01-01 PROCEDURE — 99350 HOME/RES VST EST HIGH MDM 60: CPT | Mod: 95

## 2022-01-01 PROCEDURE — 99153 MOD SED SAME PHYS/QHP EA: CPT

## 2022-01-01 PROCEDURE — C1725: CPT

## 2022-01-01 PROCEDURE — 97167 OT EVAL HIGH COMPLEX 60 MIN: CPT

## 2022-01-01 PROCEDURE — C1757: CPT

## 2022-01-01 PROCEDURE — 86803 HEPATITIS C AB TEST: CPT

## 2022-01-01 PROCEDURE — 99350 HOME/RES VST EST HIGH MDM 60: CPT | Mod: 25

## 2022-01-01 PROCEDURE — 86666 EHRLICHIA ANTIBODY: CPT

## 2022-01-01 PROCEDURE — 76937 US GUIDE VASCULAR ACCESS: CPT | Mod: 26

## 2022-01-01 PROCEDURE — 86644 CMV ANTIBODY: CPT

## 2022-01-01 PROCEDURE — 86664 EPSTEIN-BARR NUCLEAR ANTIGEN: CPT

## 2022-01-01 PROCEDURE — 36430 TRANSFUSION BLD/BLD COMPNT: CPT

## 2022-01-01 PROCEDURE — 93990 DOPPLER FLOW TESTING: CPT | Mod: 26

## 2022-01-01 PROCEDURE — P9047: CPT

## 2022-01-01 PROCEDURE — 88112 CYTOPATH CELL ENHANCE TECH: CPT | Mod: 26

## 2022-01-01 PROCEDURE — 83735 ASSAY OF MAGNESIUM: CPT

## 2022-01-01 PROCEDURE — U0003: CPT

## 2022-01-01 PROCEDURE — 99285 EMERGENCY DEPT VISIT HI MDM: CPT | Mod: 25

## 2022-01-01 PROCEDURE — 99053 MED SERV 10PM-8AM 24 HR FAC: CPT

## 2022-01-01 PROCEDURE — 99497 ADVNCD CARE PLAN 30 MIN: CPT

## 2022-01-01 PROCEDURE — 93298 REM INTERROG DEV EVAL SCRMS: CPT

## 2022-01-01 PROCEDURE — 83605 ASSAY OF LACTIC ACID: CPT

## 2022-01-01 PROCEDURE — C1750: CPT

## 2022-01-01 PROCEDURE — 35666 BPG FEM-ANT TIB PST TIB/PRNL: CPT | Mod: LT

## 2022-01-01 PROCEDURE — 82140 ASSAY OF AMMONIA: CPT

## 2022-01-01 PROCEDURE — 86705 HEP B CORE ANTIBODY IGM: CPT

## 2022-01-01 PROCEDURE — 0225U NFCT DS DNA&RNA 21 SARSCOV2: CPT

## 2022-01-01 PROCEDURE — 99223 1ST HOSP IP/OBS HIGH 75: CPT | Mod: GC,57

## 2022-01-01 PROCEDURE — 99292 CRITICAL CARE ADDL 30 MIN: CPT

## 2022-01-01 PROCEDURE — 71250 CT THORAX DX C-: CPT | Mod: MA

## 2022-01-01 PROCEDURE — 87102 FUNGUS ISOLATION CULTURE: CPT

## 2022-01-01 PROCEDURE — 99291 CRITICAL CARE FIRST HOUR: CPT | Mod: 25

## 2022-01-01 PROCEDURE — 36558 INSERT TUNNELED CV CATH: CPT | Mod: RT,59

## 2022-01-01 PROCEDURE — 93010 ELECTROCARDIOGRAM REPORT: CPT

## 2022-01-01 PROCEDURE — 88311 DECALCIFY TISSUE: CPT | Mod: 26

## 2022-01-01 PROCEDURE — 36600 WITHDRAWAL OF ARTERIAL BLOOD: CPT

## 2022-01-01 PROCEDURE — 93926 LOWER EXTREMITY STUDY: CPT

## 2022-01-01 PROCEDURE — 37618 LIGATION MAJOR ARTERY XTR: CPT | Mod: LT,78

## 2022-01-01 PROCEDURE — 77001 FLUOROGUIDE FOR VEIN DEVICE: CPT | Mod: 26

## 2022-01-01 PROCEDURE — U0005: CPT

## 2022-01-01 PROCEDURE — 85652 RBC SED RATE AUTOMATED: CPT

## 2022-01-01 PROCEDURE — C1887: CPT

## 2022-01-01 PROCEDURE — C8929: CPT

## 2022-01-01 PROCEDURE — 84466 ASSAY OF TRANSFERRIN: CPT

## 2022-01-01 PROCEDURE — 83615 LACTATE (LD) (LDH) ENZYME: CPT

## 2022-01-01 PROCEDURE — 87077 CULTURE AEROBIC IDENTIFY: CPT

## 2022-01-01 PROCEDURE — 99231 SBSQ HOSP IP/OBS SF/LOW 25: CPT | Mod: GC

## 2022-01-01 PROCEDURE — 82550 ASSAY OF CK (CPK): CPT

## 2022-01-01 PROCEDURE — 82042 OTHER SOURCE ALBUMIN QUAN EA: CPT

## 2022-01-01 PROCEDURE — 88307 TISSUE EXAM BY PATHOLOGIST: CPT | Mod: 26

## 2022-01-01 PROCEDURE — 82553 CREATINE MB FRACTION: CPT

## 2022-01-01 PROCEDURE — 74176 CT ABD & PELVIS W/O CONTRAST: CPT

## 2022-01-01 PROCEDURE — 88112 CYTOPATH CELL ENHANCE TECH: CPT

## 2022-01-01 PROCEDURE — 87070 CULTURE OTHR SPECIMN AEROBIC: CPT

## 2022-01-01 PROCEDURE — 99222 1ST HOSP IP/OBS MODERATE 55: CPT

## 2022-01-01 PROCEDURE — 84134 ASSAY OF PREALBUMIN: CPT

## 2022-01-01 PROCEDURE — 88305 TISSUE EXAM BY PATHOLOGIST: CPT

## 2022-01-01 PROCEDURE — 87637 SARSCOV2&INF A&B&RSV AMP PRB: CPT

## 2022-01-01 PROCEDURE — C1751: CPT

## 2022-01-01 PROCEDURE — 73718 MRI LOWER EXTREMITY W/O DYE: CPT | Mod: 26,LT

## 2022-01-01 PROCEDURE — 93923 UPR/LXTR ART STDY 3+ LVLS: CPT | Mod: 26

## 2022-01-01 PROCEDURE — 71045 X-RAY EXAM CHEST 1 VIEW: CPT

## 2022-01-01 PROCEDURE — 86706 HEP B SURFACE ANTIBODY: CPT

## 2022-01-01 PROCEDURE — 99152 MOD SED SAME PHYS/QHP 5/>YRS: CPT

## 2022-01-01 PROCEDURE — 37186 SEC ART THROMBECTOMY ADD-ON: CPT

## 2022-01-01 PROCEDURE — 87040 BLOOD CULTURE FOR BACTERIA: CPT

## 2022-01-01 PROCEDURE — G0506: CPT

## 2022-01-01 PROCEDURE — 99239 HOSP IP/OBS DSCHRG MGMT >30: CPT

## 2022-01-01 PROCEDURE — 82728 ASSAY OF FERRITIN: CPT

## 2022-01-01 PROCEDURE — 82330 ASSAY OF CALCIUM: CPT

## 2022-01-01 PROCEDURE — 84132 ASSAY OF SERUM POTASSIUM: CPT

## 2022-01-01 PROCEDURE — 86747 PARVOVIRUS ANTIBODY: CPT

## 2022-01-01 PROCEDURE — 93971 EXTREMITY STUDY: CPT

## 2022-01-01 PROCEDURE — 93971 EXTREMITY STUDY: CPT | Mod: 26,LT

## 2022-01-01 PROCEDURE — 45330 DIAGNOSTIC SIGMOIDOSCOPY: CPT

## 2022-01-01 PROCEDURE — 76942 ECHO GUIDE FOR BIOPSY: CPT

## 2022-01-01 PROCEDURE — C1760: CPT

## 2022-01-01 PROCEDURE — 86663 EPSTEIN-BARR ANTIBODY: CPT

## 2022-01-01 PROCEDURE — 86665 EPSTEIN-BARR CAPSID VCA: CPT

## 2022-01-01 PROCEDURE — 36410 VNPNXR 3YR/> PHY/QHP DX/THER: CPT

## 2022-01-01 PROCEDURE — 99291 CRITICAL CARE FIRST HOUR: CPT

## 2022-01-01 PROCEDURE — 82945 GLUCOSE OTHER FLUID: CPT

## 2022-01-01 PROCEDURE — 75635 CT ANGIO ABDOMINAL ARTERIES: CPT

## 2022-01-01 PROCEDURE — 74177 CT ABD & PELVIS W/CONTRAST: CPT

## 2022-01-01 PROCEDURE — 82962 GLUCOSE BLOOD TEST: CPT

## 2022-01-01 PROCEDURE — 84145 PROCALCITONIN (PCT): CPT

## 2022-01-01 PROCEDURE — 36000 PLACE NEEDLE IN VEIN: CPT

## 2022-01-01 PROCEDURE — 74176 CT ABD & PELVIS W/O CONTRAST: CPT | Mod: 26

## 2022-01-01 PROCEDURE — 93923 UPR/LXTR ART STDY 3+ LVLS: CPT

## 2022-01-01 PROCEDURE — 89051 BODY FLUID CELL COUNT: CPT

## 2022-01-01 PROCEDURE — 86923 COMPATIBILITY TEST ELECTRIC: CPT

## 2022-01-01 PROCEDURE — 99261: CPT

## 2022-01-01 PROCEDURE — 93279 PRGRMG DEV EVAL PM/LDLS PM: CPT | Mod: 26

## 2022-01-01 PROCEDURE — 93990 DOPPLER FLOW TESTING: CPT

## 2022-01-01 PROCEDURE — 99223 1ST HOSP IP/OBS HIGH 75: CPT | Mod: GC

## 2022-01-01 PROCEDURE — 76942 ECHO GUIDE FOR BIOPSY: CPT | Mod: 26

## 2022-01-01 PROCEDURE — 97535 SELF CARE MNGMENT TRAINING: CPT

## 2022-01-01 PROCEDURE — 85018 HEMOGLOBIN: CPT

## 2022-01-01 PROCEDURE — 93971 EXTREMITY STUDY: CPT | Mod: 26,RT

## 2022-01-01 PROCEDURE — 84443 ASSAY THYROID STIM HORMONE: CPT

## 2022-01-01 PROCEDURE — 88307 TISSUE EXAM BY PATHOLOGIST: CPT

## 2022-01-01 PROCEDURE — 93306 TTE W/DOPPLER COMPLETE: CPT | Mod: 26

## 2022-01-01 PROCEDURE — C1769: CPT

## 2022-01-01 PROCEDURE — 88305 TISSUE EXAM BY PATHOLOGIST: CPT | Mod: 26

## 2022-01-01 PROCEDURE — 73700 CT LOWER EXTREMITY W/O DYE: CPT

## 2022-01-01 PROCEDURE — 99231 SBSQ HOSP IP/OBS SF/LOW 25: CPT

## 2022-01-01 PROCEDURE — 37228: CPT

## 2022-01-01 PROCEDURE — 86618 LYME DISEASE ANTIBODY: CPT

## 2022-01-01 PROCEDURE — 97163 PT EVAL HIGH COMPLEX 45 MIN: CPT

## 2022-01-01 PROCEDURE — P9045: CPT

## 2022-01-01 PROCEDURE — 87205 SMEAR GRAM STAIN: CPT

## 2022-01-01 PROCEDURE — 85014 HEMATOCRIT: CPT

## 2022-01-01 PROCEDURE — 75635 CT ANGIO ABDOMINAL ARTERIES: CPT | Mod: 26

## 2022-01-01 PROCEDURE — 82272 OCCULT BLD FECES 1-3 TESTS: CPT

## 2022-01-01 PROCEDURE — 86704 HEP B CORE ANTIBODY TOTAL: CPT

## 2022-01-01 PROCEDURE — 83540 ASSAY OF IRON: CPT

## 2022-01-01 PROCEDURE — 73620 X-RAY EXAM OF FOOT: CPT

## 2022-01-01 PROCEDURE — 84295 ASSAY OF SERUM SODIUM: CPT

## 2022-01-01 PROCEDURE — 74230 X-RAY XM SWLNG FUNCJ C+: CPT | Mod: 26

## 2022-01-01 PROCEDURE — C1894: CPT

## 2022-01-01 PROCEDURE — 71250 CT THORAX DX C-: CPT | Mod: 26

## 2022-01-01 PROCEDURE — 83550 IRON BINDING TEST: CPT

## 2022-01-01 PROCEDURE — 86645 CMV ANTIBODY IGM: CPT

## 2022-01-01 PROCEDURE — 88311 DECALCIFY TISSUE: CPT

## 2022-01-01 PROCEDURE — 74177 CT ABD & PELVIS W/CONTRAST: CPT | Mod: 26

## 2022-01-01 PROCEDURE — 35661 BPG FEMORAL-FEMORAL: CPT | Mod: AS

## 2022-01-01 PROCEDURE — 82947 ASSAY GLUCOSE BLOOD QUANT: CPT

## 2022-01-01 PROCEDURE — 73718 MRI LOWER EXTREMITY W/O DYE: CPT

## 2022-01-01 PROCEDURE — 87075 CULTR BACTERIA EXCEPT BLOOD: CPT

## 2022-01-01 PROCEDURE — 35876 REMOVAL OF CLOT IN GRAFT: CPT | Mod: LT,78

## 2022-01-01 PROCEDURE — 99232 SBSQ HOSP IP/OBS MODERATE 35: CPT | Mod: 25

## 2022-01-01 PROCEDURE — 74230 X-RAY XM SWLNG FUNCJ C+: CPT

## 2022-01-01 PROCEDURE — 83986 ASSAY PH BODY FLUID NOS: CPT

## 2022-01-01 DEVICE — CLIP APPLIER COVIDIEN SURGICLIP 13" LARGE: Type: IMPLANTABLE DEVICE | Status: FUNCTIONAL

## 2022-01-01 DEVICE — HEMOSTAT ARISTA 3GR: Type: IMPLANTABLE DEVICE | Status: FUNCTIONAL

## 2022-01-01 DEVICE — SPONGE GELFOAM SZ 100 UNCOMPRESSED: Type: IMPLANTABLE DEVICE | Status: FUNCTIONAL

## 2022-01-01 DEVICE — CATH GLIDEPATH 14.5FR 19CM: Type: IMPLANTABLE DEVICE | Status: FUNCTIONAL

## 2022-01-01 DEVICE — SURGIFOAM PAD SZ 100: Type: IMPLANTABLE DEVICE | Status: FUNCTIONAL

## 2022-01-01 DEVICE — GRAFT VASC PROPATEN 8MMX40X50CM TW REMOV RING: Type: IMPLANTABLE DEVICE | Status: FUNCTIONAL

## 2022-01-01 DEVICE — BONE WAX 2.5GM: Type: IMPLANTABLE DEVICE | Status: FUNCTIONAL

## 2022-01-01 DEVICE — CLIP APPLIER COVIDIEN SURGICLIP 11.5" MEDIUM: Type: IMPLANTABLE DEVICE | Status: FUNCTIONAL

## 2022-01-01 DEVICE — CLIP APPLIER ETHICON LIGACLIP 11.5" MEDIUM: Type: IMPLANTABLE DEVICE | Status: FUNCTIONAL

## 2022-01-01 DEVICE — CLIP APPLIER COVIDIEN SURGICLIP III 9" SM: Type: IMPLANTABLE DEVICE | Status: FUNCTIONAL

## 2022-01-01 DEVICE — IMPLANTABLE DEVICE: Type: IMPLANTABLE DEVICE | Status: FUNCTIONAL

## 2022-01-01 DEVICE — GRAFT VASC PROPATEN 6MMX40X50CM TW REMOV RING: Type: IMPLANTABLE DEVICE | Status: FUNCTIONAL

## 2022-01-01 DEVICE — CATH ART EMB FOGARTY 3F 80CM 5/CA: Type: IMPLANTABLE DEVICE | Status: FUNCTIONAL

## 2022-01-01 DEVICE — KIT A-LINE 1LUM 20GX12CM SAFE KIT: Type: IMPLANTABLE DEVICE | Status: FUNCTIONAL

## 2022-01-01 RX ORDER — DEXTROSE 50 % IN WATER 50 %
12.5 SYRINGE (ML) INTRAVENOUS ONCE
Refills: 0 | Status: DISCONTINUED | OUTPATIENT
Start: 2022-01-01 | End: 2022-01-01

## 2022-01-01 RX ORDER — ATORVASTATIN CALCIUM 80 MG/1
80 TABLET, FILM COATED ORAL AT BEDTIME
Refills: 0 | Status: DISCONTINUED | OUTPATIENT
Start: 2022-01-01 | End: 2022-01-01

## 2022-01-01 RX ORDER — DEXTROSE 50 % IN WATER 50 %
15 SYRINGE (ML) INTRAVENOUS ONCE
Refills: 0 | Status: COMPLETED | OUTPATIENT
Start: 2022-01-01 | End: 2022-01-01

## 2022-01-01 RX ORDER — HYDROCORTISONE 1 %
1 OINTMENT (GRAM) TOPICAL EVERY 12 HOURS
Refills: 0 | Status: DISCONTINUED | OUTPATIENT
Start: 2022-01-01 | End: 2022-01-01

## 2022-01-01 RX ORDER — SEVELAMER CARBONATE 2400 MG/1
1 POWDER, FOR SUSPENSION ORAL
Qty: 90 | Refills: 0
Start: 2022-01-01 | End: 2022-08-03

## 2022-01-01 RX ORDER — SODIUM CHLORIDE 9 MG/ML
1000 INJECTION INTRAMUSCULAR; INTRAVENOUS; SUBCUTANEOUS ONCE
Refills: 0 | Status: COMPLETED | OUTPATIENT
Start: 2022-01-01 | End: 2022-01-01

## 2022-01-01 RX ORDER — SODIUM CHLORIDE 9 MG/ML
1000 INJECTION, SOLUTION INTRAVENOUS
Refills: 0 | Status: DISCONTINUED | OUTPATIENT
Start: 2022-01-01 | End: 2022-01-01

## 2022-01-01 RX ORDER — FENTANYL CITRATE 50 UG/ML
1 INJECTION INTRAVENOUS
Refills: 0 | Status: DISCONTINUED | OUTPATIENT
Start: 2022-01-01 | End: 2022-01-01

## 2022-01-01 RX ORDER — PANTOPRAZOLE SODIUM 20 MG/1
8 TABLET, DELAYED RELEASE ORAL
Qty: 80 | Refills: 0 | Status: DISCONTINUED | OUTPATIENT
Start: 2022-01-01 | End: 2022-01-01

## 2022-01-01 RX ORDER — DEXTROSE 50 % IN WATER 50 %
25 SYRINGE (ML) INTRAVENOUS ONCE
Refills: 0 | Status: DISCONTINUED | OUTPATIENT
Start: 2022-01-01 | End: 2022-01-01

## 2022-01-01 RX ORDER — ACETAMINOPHEN 500 MG
650 TABLET ORAL EVERY 6 HOURS
Refills: 0 | Status: DISCONTINUED | OUTPATIENT
Start: 2022-01-01 | End: 2022-01-01

## 2022-01-01 RX ORDER — LIDOCAINE HCL 20 MG/ML
20 VIAL (ML) INJECTION ONCE
Refills: 0 | Status: DISCONTINUED | OUTPATIENT
Start: 2022-01-01 | End: 2022-01-01

## 2022-01-01 RX ORDER — TRAZODONE HCL 50 MG
1 TABLET ORAL
Qty: 30 | Refills: 0
Start: 2022-01-01 | End: 2022-08-03

## 2022-01-01 RX ORDER — SENNA PLUS 8.6 MG/1
2 TABLET ORAL AT BEDTIME
Refills: 0 | Status: DISCONTINUED | OUTPATIENT
Start: 2022-01-01 | End: 2022-01-01

## 2022-01-01 RX ORDER — SODIUM CHLORIDE 9 MG/ML
10 INJECTION INTRAMUSCULAR; INTRAVENOUS; SUBCUTANEOUS
Refills: 0 | Status: DISCONTINUED | OUTPATIENT
Start: 2022-01-01 | End: 2022-01-01

## 2022-01-01 RX ORDER — PANTOPRAZOLE SODIUM 20 MG/1
40 TABLET, DELAYED RELEASE ORAL
Refills: 0 | Status: DISCONTINUED | OUTPATIENT
Start: 2022-01-01 | End: 2022-01-01

## 2022-01-01 RX ORDER — MIDODRINE HYDROCHLORIDE 2.5 MG/1
10 TABLET ORAL THREE TIMES A DAY
Refills: 0 | Status: DISCONTINUED | OUTPATIENT
Start: 2022-01-01 | End: 2022-01-01

## 2022-01-01 RX ORDER — LIDOCAINE 4 G/100G
1 CREAM TOPICAL EVERY 24 HOURS
Refills: 0 | Status: DISCONTINUED | OUTPATIENT
Start: 2022-01-01 | End: 2022-01-01

## 2022-01-01 RX ORDER — HALOPERIDOL DECANOATE 100 MG/ML
5 INJECTION INTRAMUSCULAR ONCE
Refills: 0 | Status: COMPLETED | OUTPATIENT
Start: 2022-01-01 | End: 2022-01-01

## 2022-01-01 RX ORDER — SEVELAMER CARBONATE 2400 MG/1
800 POWDER, FOR SUSPENSION ORAL
Refills: 0 | Status: DISCONTINUED | OUTPATIENT
Start: 2022-01-01 | End: 2022-01-01

## 2022-01-01 RX ORDER — MORPHINE SULFATE 50 MG/1
2 CAPSULE, EXTENDED RELEASE ORAL ONCE
Refills: 0 | Status: DISCONTINUED | OUTPATIENT
Start: 2022-01-01 | End: 2022-01-01

## 2022-01-01 RX ORDER — PIPERACILLIN AND TAZOBACTAM 4; .5 G/20ML; G/20ML
3.38 INJECTION, POWDER, LYOPHILIZED, FOR SOLUTION INTRAVENOUS EVERY 12 HOURS
Refills: 0 | Status: DISCONTINUED | OUTPATIENT
Start: 2022-01-01 | End: 2022-01-01

## 2022-01-01 RX ORDER — OXYCODONE HYDROCHLORIDE 5 MG/1
10 TABLET ORAL EVERY 4 HOURS
Refills: 0 | Status: DISCONTINUED | OUTPATIENT
Start: 2022-01-01 | End: 2022-01-01

## 2022-01-01 RX ORDER — ERGOCALCIFEROL 1.25 MG/1
1.25 MG CAPSULE, LIQUID FILLED ORAL
Qty: 12 | Refills: 3 | Status: ACTIVE | COMMUNITY
Start: 2019-01-27

## 2022-01-01 RX ORDER — SENNOSIDES 8.6 MG
8.6 TABLET ORAL
Qty: 180 | Refills: 3 | Status: ACTIVE | COMMUNITY
Start: 2022-01-01

## 2022-01-01 RX ORDER — ASPIRIN/CALCIUM CARB/MAGNESIUM 324 MG
1 TABLET ORAL
Qty: 0 | Refills: 0 | DISCHARGE

## 2022-01-01 RX ORDER — TORSEMIDE 20 MG/1
20 TABLET ORAL WEEKLY
Qty: 52 | Refills: 3 | Status: ACTIVE | COMMUNITY
Start: 2022-01-01

## 2022-01-01 RX ORDER — ERYTHROPOIETIN 10000 [IU]/ML
10000 INJECTION, SOLUTION INTRAVENOUS; SUBCUTANEOUS ONCE
Refills: 0 | Status: COMPLETED | OUTPATIENT
Start: 2022-01-01 | End: 2022-01-01

## 2022-01-01 RX ORDER — ENOXAPARIN SODIUM 100 MG/ML
68 INJECTION SUBCUTANEOUS EVERY 24 HOURS
Refills: 0 | Status: DISCONTINUED | OUTPATIENT
Start: 2022-01-01 | End: 2022-01-01

## 2022-01-01 RX ORDER — ERYTHROPOIETIN 10000 [IU]/ML
10000 INJECTION, SOLUTION INTRAVENOUS; SUBCUTANEOUS ONCE
Refills: 0 | Status: DISCONTINUED | OUTPATIENT
Start: 2022-01-01 | End: 2022-01-01

## 2022-01-01 RX ORDER — DULOXETINE HYDROCHLORIDE 60 MG/1
60 CAPSULE, DELAYED RELEASE ORAL
Refills: 0 | Status: COMPLETED | COMMUNITY
Start: 2021-05-04 | End: 2022-01-01

## 2022-01-01 RX ORDER — HYDROMORPHONE HYDROCHLORIDE 2 MG/ML
2 INJECTION INTRAMUSCULAR; INTRAVENOUS; SUBCUTANEOUS EVERY 4 HOURS
Refills: 0 | Status: DISCONTINUED | OUTPATIENT
Start: 2022-01-01 | End: 2022-01-01

## 2022-01-01 RX ORDER — KETOROLAC TROMETHAMINE 30 MG/ML
15 SYRINGE (ML) INJECTION EVERY 8 HOURS
Refills: 0 | Status: DISCONTINUED | OUTPATIENT
Start: 2022-01-01 | End: 2022-01-01

## 2022-01-01 RX ORDER — VANCOMYCIN HCL 1 G
1000 VIAL (EA) INTRAVENOUS ONCE
Refills: 0 | Status: COMPLETED | OUTPATIENT
Start: 2022-01-01 | End: 2022-01-01

## 2022-01-01 RX ORDER — MEROPENEM 1 G/30ML
500 INJECTION INTRAVENOUS EVERY 24 HOURS
Refills: 0 | Status: DISCONTINUED | OUTPATIENT
Start: 2022-01-01 | End: 2022-01-01

## 2022-01-01 RX ORDER — POTASSIUM CHLORIDE 20 MEQ
20 PACKET (EA) ORAL
Refills: 0 | Status: COMPLETED | OUTPATIENT
Start: 2022-01-01 | End: 2022-01-01

## 2022-01-01 RX ORDER — ACETAMINOPHEN 500 MG
325 TABLET ORAL ONCE
Refills: 0 | Status: COMPLETED | OUTPATIENT
Start: 2022-01-01 | End: 2022-01-01

## 2022-01-01 RX ORDER — ACETAMINOPHEN 500 MG
1000 TABLET ORAL ONCE
Refills: 0 | Status: COMPLETED | OUTPATIENT
Start: 2022-01-01 | End: 2022-01-01

## 2022-01-01 RX ORDER — TAMSULOSIN HYDROCHLORIDE 0.4 MG/1
0.4 CAPSULE ORAL AT BEDTIME
Refills: 0 | Status: DISCONTINUED | OUTPATIENT
Start: 2022-01-01 | End: 2022-01-01

## 2022-01-01 RX ORDER — LANOLIN ALCOHOL/MO/W.PET/CERES
1 CREAM (GRAM) TOPICAL
Qty: 0 | Refills: 0 | DISCHARGE

## 2022-01-01 RX ORDER — ERYTHROPOIETIN 10000 [IU]/ML
10000 INJECTION, SOLUTION INTRAVENOUS; SUBCUTANEOUS
Refills: 0 | Status: DISCONTINUED | OUTPATIENT
Start: 2022-01-01 | End: 2022-01-01

## 2022-01-01 RX ORDER — ALLOPURINOL 100 MG/1
100 TABLET ORAL TWICE DAILY
Qty: 60 | Refills: 2 | Status: DISCONTINUED | COMMUNITY
Start: 2020-11-20 | End: 2022-01-01

## 2022-01-01 RX ORDER — QUETIAPINE FUMARATE 200 MG/1
1 TABLET, FILM COATED ORAL
Qty: 30 | Refills: 0
Start: 2022-01-01

## 2022-01-01 RX ORDER — HEPARIN SODIUM 5000 [USP'U]/ML
INJECTION INTRAVENOUS; SUBCUTANEOUS
Qty: 25000 | Refills: 0 | Status: DISCONTINUED | OUTPATIENT
Start: 2022-01-01 | End: 2022-01-01

## 2022-01-01 RX ORDER — SODIUM CHLORIDE 9 MG/ML
1000 INJECTION INTRAMUSCULAR; INTRAVENOUS; SUBCUTANEOUS ONCE
Refills: 0 | Status: DISCONTINUED | OUTPATIENT
Start: 2022-01-01 | End: 2022-01-01

## 2022-01-01 RX ORDER — CEFAZOLIN SODIUM 1 G
1000 VIAL (EA) INJECTION EVERY 8 HOURS
Refills: 0 | Status: DISCONTINUED | OUTPATIENT
Start: 2022-01-01 | End: 2022-01-01

## 2022-01-01 RX ORDER — CHLORHEXIDINE GLUCONATE 213 G/1000ML
1 SOLUTION TOPICAL
Refills: 0 | Status: DISCONTINUED | OUTPATIENT
Start: 2022-01-01 | End: 2022-01-01

## 2022-01-01 RX ORDER — CADEXOMER IODINE 0.9 %
1 PADS, MEDICATED (EA) TOPICAL
Qty: 5 | Refills: 0
Start: 2022-01-01 | End: 2022-01-01

## 2022-01-01 RX ORDER — CEFAZOLIN SODIUM 1 G
1000 VIAL (EA) INJECTION ONCE
Refills: 0 | Status: COMPLETED | OUTPATIENT
Start: 2022-01-01 | End: 2022-01-01

## 2022-01-01 RX ORDER — NIFEDIPINE 30 MG
90 TABLET, EXTENDED RELEASE 24 HR ORAL DAILY
Refills: 0 | Status: DISCONTINUED | OUTPATIENT
Start: 2022-01-01 | End: 2022-01-01

## 2022-01-01 RX ORDER — NIFEDIPINE 30 MG
60 TABLET, EXTENDED RELEASE 24 HR ORAL
Refills: 0 | Status: COMPLETED | OUTPATIENT
Start: 2022-01-01 | End: 2022-01-01

## 2022-01-01 RX ORDER — POLYETHYLENE GLYCOL 3350 17 G/17G
17 POWDER, FOR SOLUTION ORAL EVERY 12 HOURS
Refills: 0 | Status: DISCONTINUED | OUTPATIENT
Start: 2022-01-01 | End: 2022-01-01

## 2022-01-01 RX ORDER — SODIUM ZIRCONIUM CYCLOSILICATE 10 G/10G
10 POWDER, FOR SUSPENSION ORAL ONCE
Refills: 0 | Status: COMPLETED | OUTPATIENT
Start: 2022-01-01 | End: 2022-01-01

## 2022-01-01 RX ORDER — LANOLIN ALCOHOL/MO/W.PET/CERES
3 CREAM (GRAM) TOPICAL AT BEDTIME
Refills: 0 | Status: DISCONTINUED | OUTPATIENT
Start: 2022-01-01 | End: 2022-01-01

## 2022-01-01 RX ORDER — CADEXOMER IODINE 0.9 %
1 PADS, MEDICATED (EA) TOPICAL
Refills: 0 | Status: DISCONTINUED | OUTPATIENT
Start: 2022-01-01 | End: 2022-01-01

## 2022-01-01 RX ORDER — SEVELAMER CARBONATE 2400 MG/1
800 POWDER, FOR SUSPENSION ORAL THREE TIMES A DAY
Refills: 0 | Status: DISCONTINUED | OUTPATIENT
Start: 2022-01-01 | End: 2022-01-01

## 2022-01-01 RX ORDER — ERYTHROPOIETIN 10000 [IU]/ML
20000 INJECTION, SOLUTION INTRAVENOUS; SUBCUTANEOUS ONCE
Refills: 0 | Status: COMPLETED | OUTPATIENT
Start: 2022-01-01 | End: 2022-01-01

## 2022-01-01 RX ORDER — DULOXETINE HYDROCHLORIDE 30 MG/1
60 CAPSULE, DELAYED RELEASE ORAL DAILY
Refills: 0 | Status: DISCONTINUED | OUTPATIENT
Start: 2022-01-01 | End: 2022-01-01

## 2022-01-01 RX ORDER — ASPIRIN 81 MG
81 TABLET, DELAYED RELEASE (ENTERIC COATED) ORAL
Refills: 0 | Status: DISCONTINUED | COMMUNITY
End: 2022-01-01

## 2022-01-01 RX ORDER — FENTANYL 12 UG/H
12 PATCH, EXTENDED RELEASE TRANSDERMAL
Qty: 10 | Refills: 0 | Status: ACTIVE | COMMUNITY
Start: 2022-01-01

## 2022-01-01 RX ORDER — POTASSIUM CHLORIDE 20 MEQ
20 PACKET (EA) ORAL ONCE
Refills: 0 | Status: COMPLETED | OUTPATIENT
Start: 2022-01-01 | End: 2022-01-01

## 2022-01-01 RX ORDER — HALOPERIDOL DECANOATE 100 MG/ML
0.5 INJECTION INTRAMUSCULAR ONCE
Refills: 0 | Status: COMPLETED | OUTPATIENT
Start: 2022-01-01 | End: 2022-01-01

## 2022-01-01 RX ORDER — MAGNESIUM SULFATE 500 MG/ML
2 VIAL (ML) INJECTION ONCE
Refills: 0 | Status: COMPLETED | OUTPATIENT
Start: 2022-01-01 | End: 2022-01-01

## 2022-01-01 RX ORDER — MAGNESIUM SULFATE 500 MG/ML
1 VIAL (ML) INJECTION ONCE
Refills: 0 | Status: COMPLETED | OUTPATIENT
Start: 2022-01-01 | End: 2022-01-01

## 2022-01-01 RX ORDER — HEPARIN SODIUM 5000 [USP'U]/ML
2500 INJECTION INTRAVENOUS; SUBCUTANEOUS EVERY 6 HOURS
Refills: 0 | Status: DISCONTINUED | OUTPATIENT
Start: 2022-01-01 | End: 2022-01-01

## 2022-01-01 RX ORDER — NIFEDIPINE 30 MG
60 TABLET, EXTENDED RELEASE 24 HR ORAL
Refills: 0 | Status: DISCONTINUED | OUTPATIENT
Start: 2022-01-01 | End: 2022-01-01

## 2022-01-01 RX ORDER — PIPERACILLIN AND TAZOBACTAM 4; .5 G/20ML; G/20ML
3.38 INJECTION, POWDER, LYOPHILIZED, FOR SOLUTION INTRAVENOUS ONCE
Refills: 0 | Status: COMPLETED | OUTPATIENT
Start: 2022-01-01 | End: 2022-01-01

## 2022-01-01 RX ORDER — TAMSULOSIN HYDROCHLORIDE 0.4 MG/1
1 CAPSULE ORAL
Qty: 30 | Refills: 0
Start: 2022-01-01 | End: 2022-01-01

## 2022-01-01 RX ORDER — ONDANSETRON 8 MG/1
4 TABLET, FILM COATED ORAL EVERY 6 HOURS
Refills: 0 | Status: DISCONTINUED | OUTPATIENT
Start: 2022-01-01 | End: 2022-01-01

## 2022-01-01 RX ORDER — ACETYLCYSTEINE 200 MG/ML
4 VIAL (ML) MISCELLANEOUS ONCE
Refills: 0 | Status: DISCONTINUED | OUTPATIENT
Start: 2022-01-01 | End: 2022-01-01

## 2022-01-01 RX ORDER — POLYMYXIN B SULF/TRIMETHOPRIM 10000-1/ML
1 DROPS OPHTHALMIC (EYE) ONCE
Refills: 0 | Status: COMPLETED | OUTPATIENT
Start: 2022-01-01 | End: 2022-01-01

## 2022-01-01 RX ORDER — ISOSORBIDE MONONITRATE 30 MG/1
30 TABLET, EXTENDED RELEASE ORAL
Refills: 0 | Status: ACTIVE | COMMUNITY
Start: 2020-06-01

## 2022-01-01 RX ORDER — IBUPROFEN 200 MG
400 TABLET ORAL EVERY 8 HOURS
Refills: 0 | Status: COMPLETED | OUTPATIENT
Start: 2022-01-01 | End: 2022-01-01

## 2022-01-01 RX ORDER — HEPARIN SODIUM 5000 [USP'U]/ML
5500 INJECTION INTRAVENOUS; SUBCUTANEOUS EVERY 6 HOURS
Refills: 0 | Status: DISCONTINUED | OUTPATIENT
Start: 2022-01-01 | End: 2022-01-01

## 2022-01-01 RX ORDER — TRAZODONE HCL 50 MG
50 TABLET ORAL AT BEDTIME
Refills: 0 | Status: DISCONTINUED | OUTPATIENT
Start: 2022-01-01 | End: 2022-01-01

## 2022-01-01 RX ORDER — SILVER 2" X 2"
0.9 BANDAGE TOPICAL
Qty: 3 | Refills: 3 | Status: ACTIVE | COMMUNITY
Start: 2022-01-01

## 2022-01-01 RX ORDER — HYDROMORPHONE HYDROCHLORIDE 2 MG/ML
1 INJECTION INTRAMUSCULAR; INTRAVENOUS; SUBCUTANEOUS EVERY 4 HOURS
Refills: 0 | Status: DISCONTINUED | OUTPATIENT
Start: 2022-01-01 | End: 2022-01-01

## 2022-01-01 RX ORDER — HEPARIN SODIUM 5000 [USP'U]/ML
5000 INJECTION INTRAVENOUS; SUBCUTANEOUS EVERY 12 HOURS
Refills: 0 | Status: DISCONTINUED | OUTPATIENT
Start: 2022-01-01 | End: 2022-01-01

## 2022-01-01 RX ORDER — POLYETHYLENE GLYCOL 3350 17 G/17G
17 POWDER, FOR SOLUTION ORAL
Qty: 1020 | Refills: 0
Start: 2022-01-01 | End: 2022-01-01

## 2022-01-01 RX ORDER — HEPARIN SODIUM 5000 [USP'U]/ML
5500 INJECTION INTRAVENOUS; SUBCUTANEOUS ONCE
Refills: 0 | Status: COMPLETED | OUTPATIENT
Start: 2022-01-01 | End: 2022-01-01

## 2022-01-01 RX ORDER — ACETYLCYSTEINE 200 MG/ML
10 VIAL (ML) MISCELLANEOUS THREE TIMES A DAY
Refills: 0 | Status: DISCONTINUED | OUTPATIENT
Start: 2022-01-01 | End: 2022-01-01

## 2022-01-01 RX ORDER — HYDROMORPHONE HYDROCHLORIDE 2 MG/ML
2 INJECTION INTRAMUSCULAR; INTRAVENOUS; SUBCUTANEOUS EVERY 6 HOURS
Refills: 0 | Status: DISCONTINUED | OUTPATIENT
Start: 2022-01-01 | End: 2022-01-01

## 2022-01-01 RX ORDER — NIFEDIPINE 90 MG/1
90 TABLET, EXTENDED RELEASE ORAL
Qty: 90 | Refills: 3 | Status: ACTIVE | COMMUNITY
Start: 2022-01-01

## 2022-01-01 RX ORDER — MEROPENEM 1 G/30ML
1000 INJECTION INTRAVENOUS ONCE
Refills: 0 | Status: COMPLETED | OUTPATIENT
Start: 2022-01-01 | End: 2022-01-01

## 2022-01-01 RX ORDER — LANOLIN ALCOHOL/MO/W.PET/CERES
1 CREAM (GRAM) TOPICAL AT BEDTIME
Refills: 0 | Status: DISCONTINUED | OUTPATIENT
Start: 2022-01-01 | End: 2022-01-01

## 2022-01-01 RX ORDER — ASPIRIN/CALCIUM CARB/MAGNESIUM 324 MG
81 TABLET ORAL DAILY
Refills: 0 | Status: DISCONTINUED | OUTPATIENT
Start: 2022-01-01 | End: 2022-01-01

## 2022-01-01 RX ORDER — NIFEDIPINE 60 MG/1
60 TABLET, EXTENDED RELEASE ORAL
Refills: 0 | Status: COMPLETED | COMMUNITY
Start: 2022-01-01 | End: 2022-01-01

## 2022-01-01 RX ORDER — ENOXAPARIN SODIUM 100 MG/ML
68 INJECTION SUBCUTANEOUS
Qty: 2040 | Refills: 0
Start: 2022-01-01 | End: 2022-01-01

## 2022-01-01 RX ORDER — PANTOPRAZOLE SODIUM 20 MG/1
40 TABLET, DELAYED RELEASE ORAL EVERY 12 HOURS
Refills: 0 | Status: DISCONTINUED | OUTPATIENT
Start: 2022-01-01 | End: 2022-01-01

## 2022-01-01 RX ORDER — ASPIRIN/CALCIUM CARB/MAGNESIUM 324 MG
1 TABLET ORAL
Qty: 30 | Refills: 0
Start: 2022-01-01

## 2022-01-01 RX ORDER — LIDOCAINE 4 G/100G
1 CREAM TOPICAL
Qty: 30 | Refills: 0
Start: 2022-01-01 | End: 2022-08-03

## 2022-01-01 RX ORDER — HYDROMORPHONE HYDROCHLORIDE 2 MG/ML
0.5 INJECTION INTRAMUSCULAR; INTRAVENOUS; SUBCUTANEOUS EVERY 6 HOURS
Refills: 0 | Status: DISCONTINUED | OUTPATIENT
Start: 2022-01-01 | End: 2022-01-01

## 2022-01-01 RX ORDER — DULOXETINE HYDROCHLORIDE 30 MG/1
1 CAPSULE, DELAYED RELEASE ORAL
Qty: 0 | Refills: 0 | DISCHARGE

## 2022-01-01 RX ORDER — INFLUENZA VIRUS VACCINE 15; 15; 15; 15 UG/.5ML; UG/.5ML; UG/.5ML; UG/.5ML
0.7 SUSPENSION INTRAMUSCULAR ONCE
Refills: 0 | Status: DISCONTINUED | OUTPATIENT
Start: 2022-01-01 | End: 2022-01-01

## 2022-01-01 RX ORDER — MULTIVITAMIN
TABLET ORAL
Refills: 0 | Status: ACTIVE | COMMUNITY

## 2022-01-01 RX ORDER — APIXABAN 2.5 MG/1
1 TABLET, FILM COATED ORAL
Qty: 60 | Refills: 0
Start: 2022-01-01 | End: 2022-01-01

## 2022-01-01 RX ORDER — LANOLIN ALCOHOL/MO/W.PET/CERES
5 CREAM (GRAM) TOPICAL AT BEDTIME
Refills: 0 | Status: DISCONTINUED | OUTPATIENT
Start: 2022-01-01 | End: 2022-01-01

## 2022-01-01 RX ORDER — ISOSORBIDE MONONITRATE 60 MG/1
30 TABLET, EXTENDED RELEASE ORAL DAILY
Refills: 0 | Status: DISCONTINUED | OUTPATIENT
Start: 2022-01-01 | End: 2022-01-01

## 2022-01-01 RX ORDER — TRAZODONE HCL 50 MG
1 TABLET ORAL
Qty: 0 | Refills: 0 | DISCHARGE

## 2022-01-01 RX ORDER — QUETIAPINE FUMARATE 200 MG/1
25 TABLET, FILM COATED ORAL AT BEDTIME
Refills: 0 | Status: DISCONTINUED | OUTPATIENT
Start: 2022-01-01 | End: 2022-01-01

## 2022-01-01 RX ORDER — ACETAMINOPHEN 500 MG
2 TABLET ORAL
Qty: 0 | Refills: 0 | DISCHARGE
Start: 2022-01-01

## 2022-01-01 RX ORDER — ENOXAPARIN SODIUM 100 MG/ML
70 INJECTION SUBCUTANEOUS EVERY 24 HOURS
Refills: 0 | Status: DISCONTINUED | OUTPATIENT
Start: 2022-01-01 | End: 2022-01-01

## 2022-01-01 RX ORDER — TORSEMIDE 20 MG/1
20 TABLET ORAL DAILY
Qty: 120 | Refills: 3 | Status: ACTIVE | COMMUNITY
Start: 2022-01-01 | End: 1900-01-01

## 2022-01-01 RX ORDER — APIXABAN 2.5 MG/1
2.5 TABLET, FILM COATED ORAL EVERY 12 HOURS
Refills: 0 | Status: DISCONTINUED | OUTPATIENT
Start: 2022-01-01 | End: 2022-01-01

## 2022-01-01 RX ORDER — ATORVASTATIN CALCIUM 80 MG/1
80 TABLET, FILM COATED ORAL
Qty: 90 | Refills: 3 | Status: ACTIVE | COMMUNITY
Start: 2017-07-31

## 2022-01-01 RX ORDER — ASPIRIN 81 MG/1
81 TABLET, CHEWABLE ORAL DAILY
Qty: 90 | Refills: 3 | Status: ACTIVE | COMMUNITY
Start: 2022-01-01

## 2022-01-01 RX ORDER — ALTEPLASE 100 MG
2 KIT INTRAVENOUS ONCE
Refills: 0 | Status: COMPLETED | OUTPATIENT
Start: 2022-01-01 | End: 2022-01-01

## 2022-01-01 RX ORDER — SEVELAMER CARBONATE 2400 MG/1
1 POWDER, FOR SUSPENSION ORAL
Qty: 0 | Refills: 0 | DISCHARGE

## 2022-01-01 RX ORDER — HYDRALAZINE HCL 50 MG
5 TABLET ORAL ONCE
Refills: 0 | Status: COMPLETED | OUTPATIENT
Start: 2022-01-01 | End: 2022-01-01

## 2022-01-01 RX ORDER — ACETAMINOPHEN 500 MG
975 TABLET ORAL EVERY 6 HOURS
Refills: 0 | Status: DISCONTINUED | OUTPATIENT
Start: 2022-01-01 | End: 2022-01-01

## 2022-01-01 RX ORDER — METHADONE HYDROCHLORIDE 40 MG/1
2.5 TABLET ORAL ONCE
Refills: 0 | Status: DISCONTINUED | OUTPATIENT
Start: 2022-01-01 | End: 2022-01-01

## 2022-01-01 RX ORDER — LIDOCAINE AND PRILOCAINE 25; 25 MG/G; MG/G
2.5-2.5 CREAM TOPICAL
Qty: 1 | Refills: 3 | Status: ACTIVE | COMMUNITY
Start: 2022-01-01

## 2022-01-01 RX ORDER — GLUCAGON INJECTION, SOLUTION 0.5 MG/.1ML
1 INJECTION, SOLUTION SUBCUTANEOUS ONCE
Refills: 0 | Status: DISCONTINUED | OUTPATIENT
Start: 2022-01-01 | End: 2022-01-01

## 2022-01-01 RX ORDER — ACETAMINOPHEN 500 MG
1000 TABLET ORAL EVERY 6 HOURS
Refills: 0 | Status: DISCONTINUED | OUTPATIENT
Start: 2022-01-01 | End: 2022-01-01

## 2022-01-01 RX ORDER — AMOXICILLIN AND CLAVULANATE POTASSIUM 250; 125 MG/1; MG/1
250-125 TABLET, FILM COATED ORAL
Refills: 0 | Status: ACTIVE | COMMUNITY
Start: 2022-01-01

## 2022-01-01 RX ORDER — ALBUMIN HUMAN 25 %
100 VIAL (ML) INTRAVENOUS ONCE
Refills: 0 | Status: COMPLETED | OUTPATIENT
Start: 2022-01-01 | End: 2022-01-01

## 2022-01-01 RX ORDER — ONDANSETRON 8 MG/1
4 TABLET, FILM COATED ORAL ONCE
Refills: 0 | Status: DISCONTINUED | OUTPATIENT
Start: 2022-01-01 | End: 2022-01-01

## 2022-01-01 RX ORDER — IBUPROFEN 200 MG
400 TABLET ORAL
Refills: 0 | Status: COMPLETED | OUTPATIENT
Start: 2022-01-01 | End: 2022-01-01

## 2022-01-01 RX ORDER — IBUPROFEN 200 MG
200 TABLET ORAL ONCE
Refills: 0 | Status: COMPLETED | OUTPATIENT
Start: 2022-01-01 | End: 2022-01-01

## 2022-01-01 RX ORDER — HYDROMORPHONE HYDROCHLORIDE 2 MG/ML
1 INJECTION INTRAMUSCULAR; INTRAVENOUS; SUBCUTANEOUS
Qty: 10 | Refills: 0
Start: 2022-01-01

## 2022-01-01 RX ORDER — CLOPIDOGREL BISULFATE 75 MG/1
75 TABLET, FILM COATED ORAL
Qty: 90 | Refills: 1 | Status: DISCONTINUED | COMMUNITY
Start: 2020-12-05 | End: 2022-01-01

## 2022-01-01 RX ORDER — ACETAMINOPHEN 500 MG
650 TABLET ORAL ONCE
Refills: 0 | Status: DISCONTINUED | OUTPATIENT
Start: 2022-01-01 | End: 2022-01-01

## 2022-01-01 RX ORDER — HYDROMORPHONE HYDROCHLORIDE 2 MG/ML
0.5 INJECTION INTRAMUSCULAR; INTRAVENOUS; SUBCUTANEOUS
Refills: 0 | Status: DISCONTINUED | OUTPATIENT
Start: 2022-01-01 | End: 2022-01-01

## 2022-01-01 RX ORDER — SODIUM CHLORIDE 9 MG/ML
250 INJECTION, SOLUTION INTRAVENOUS ONCE
Refills: 0 | Status: COMPLETED | OUTPATIENT
Start: 2022-01-01 | End: 2022-01-01

## 2022-01-01 RX ORDER — SEVELAMER CARBONATE 2400 MG/1
800 POWDER, FOR SUSPENSION ORAL EVERY 8 HOURS
Refills: 0 | Status: DISCONTINUED | OUTPATIENT
Start: 2022-01-01 | End: 2022-01-01

## 2022-01-01 RX ORDER — TAMSULOSIN HYDROCHLORIDE 0.4 MG/1
0.4 CAPSULE ORAL
Qty: 90 | Refills: 3 | Status: ACTIVE | COMMUNITY
Start: 2021-05-04

## 2022-01-01 RX ORDER — AZITHROMYCIN 250 MG/1
250 TABLET, FILM COATED ORAL
Qty: 1 | Refills: 0 | Status: ACTIVE | COMMUNITY
Start: 2022-01-01

## 2022-01-01 RX ORDER — ACETAMINOPHEN 325 MG/1
325 TABLET ORAL EVERY 6 HOURS
Qty: 100 | Refills: 3 | Status: ACTIVE | COMMUNITY
Start: 2022-01-01

## 2022-01-01 RX ORDER — CHLORHEXIDINE GLUCONATE 213 G/1000ML
1 SOLUTION TOPICAL DAILY
Refills: 0 | Status: DISCONTINUED | OUTPATIENT
Start: 2022-01-01 | End: 2022-01-01

## 2022-01-01 RX ORDER — HYDROCORTISONE ACETATE 25 MG/1
25 SUPPOSITORY RECTAL TWICE DAILY
Qty: 1 | Refills: 3 | Status: ACTIVE | COMMUNITY
Start: 2022-01-01

## 2022-01-01 RX ORDER — TRAZODONE HCL 50 MG
2 TABLET ORAL
Qty: 0 | Refills: 0 | DISCHARGE

## 2022-01-01 RX ORDER — MEROPENEM 1 G/30ML
500 INJECTION INTRAVENOUS EVERY 12 HOURS
Refills: 0 | Status: DISCONTINUED | OUTPATIENT
Start: 2022-01-01 | End: 2022-01-01

## 2022-01-01 RX ORDER — HEPARIN SODIUM 5000 [USP'U]/ML
1300 INJECTION INTRAVENOUS; SUBCUTANEOUS
Qty: 25000 | Refills: 0 | Status: DISCONTINUED | OUTPATIENT
Start: 2022-01-01 | End: 2022-01-01

## 2022-01-01 RX ORDER — ENOXAPARIN SODIUM 100 MG/ML
60 INJECTION SUBCUTANEOUS EVERY 12 HOURS
Refills: 0 | Status: DISCONTINUED | OUTPATIENT
Start: 2022-01-01 | End: 2022-01-01

## 2022-01-01 RX ORDER — VIT B COMP NO.3/FOLIC/C/BIOTIN 1 MG-60 MG
1 TABLET ORAL
Qty: 90 | Refills: 3 | Status: ACTIVE | COMMUNITY
Start: 2018-11-20

## 2022-01-01 RX ORDER — ERYTHROPOIETIN 10000 [IU]/ML
15000 INJECTION, SOLUTION INTRAVENOUS; SUBCUTANEOUS
Refills: 0 | Status: DISCONTINUED | OUTPATIENT
Start: 2022-01-01 | End: 2022-01-01

## 2022-01-01 RX ORDER — NIFEDIPINE 30 MG
60 TABLET, EXTENDED RELEASE 24 HR ORAL DAILY
Refills: 0 | Status: DISCONTINUED | OUTPATIENT
Start: 2022-01-01 | End: 2022-01-01

## 2022-01-01 RX ORDER — ALBUMIN HUMAN 25 %
250 VIAL (ML) INTRAVENOUS ONCE
Refills: 0 | Status: COMPLETED | OUTPATIENT
Start: 2022-01-01 | End: 2022-01-01

## 2022-01-01 RX ORDER — ACETAMINOPHEN 500 MG
1000 TABLET ORAL EVERY 6 HOURS
Refills: 0 | Status: COMPLETED | OUTPATIENT
Start: 2022-01-01 | End: 2022-01-01

## 2022-01-01 RX ORDER — GABAPENTIN 100 MG/1
100 CAPSULE ORAL
Qty: 90 | Refills: 0 | Status: DISCONTINUED | COMMUNITY
Start: 2022-01-01 | End: 2022-01-01

## 2022-01-01 RX ORDER — HEPARIN SODIUM 5000 [USP'U]/ML
5000 INJECTION INTRAVENOUS; SUBCUTANEOUS EVERY 8 HOURS
Refills: 0 | Status: DISCONTINUED | OUTPATIENT
Start: 2022-01-01 | End: 2022-01-01

## 2022-01-01 RX ORDER — GLUCOSAMINE HCL/CHONDROITIN SU 500-400 MG
3 CAPSULE ORAL
Qty: 100 | Refills: 3 | Status: ACTIVE | COMMUNITY
Start: 2022-01-01

## 2022-01-01 RX ORDER — OXYCODONE HYDROCHLORIDE 5 MG/1
5 TABLET ORAL EVERY 4 HOURS
Refills: 0 | Status: DISCONTINUED | OUTPATIENT
Start: 2022-01-01 | End: 2022-01-01

## 2022-01-01 RX ORDER — LANOLIN ALCOHOL/MO/W.PET/CERES
3 CREAM (GRAM) TOPICAL ONCE
Refills: 0 | Status: COMPLETED | OUTPATIENT
Start: 2022-01-01 | End: 2022-01-01

## 2022-01-01 RX ORDER — POLYETHYLENE GLYCOL 3350 17 G/17G
17 POWDER, FOR SOLUTION ORAL TWICE DAILY
Refills: 0 | Status: ACTIVE | COMMUNITY
Start: 2022-01-01

## 2022-01-01 RX ORDER — HYDROMORPHONE HYDROCHLORIDE 2 MG/ML
0.5 INJECTION INTRAMUSCULAR; INTRAVENOUS; SUBCUTANEOUS EVERY 4 HOURS
Refills: 0 | Status: DISCONTINUED | OUTPATIENT
Start: 2022-01-01 | End: 2022-01-01

## 2022-01-01 RX ORDER — SODIUM CHLORIDE 9 MG/ML
2100 INJECTION, SOLUTION INTRAVENOUS ONCE
Refills: 0 | Status: DISCONTINUED | OUTPATIENT
Start: 2022-01-01 | End: 2022-01-01

## 2022-01-01 RX ORDER — TRAMADOL HYDROCHLORIDE 50 MG/1
25 TABLET ORAL ONCE
Refills: 0 | Status: DISCONTINUED | OUTPATIENT
Start: 2022-01-01 | End: 2022-01-01

## 2022-01-01 RX ORDER — DIPHENHYDRAMINE HYDROCHLORIDE AND LIDOCAINE HYDROCHLORIDE AND ALUMINUM HYDROXIDE AND MAGNESIUM HYDRO
15 KIT THREE TIMES A DAY
Refills: 0 | Status: DISCONTINUED | OUTPATIENT
Start: 2022-01-01 | End: 2022-01-01

## 2022-01-01 RX ORDER — FENTANYL CITRATE 50 UG/ML
1 INJECTION INTRAVENOUS
Qty: 15 | Refills: 0
Start: 2022-01-01 | End: 2022-08-03

## 2022-01-01 RX ORDER — FENTANYL CITRATE 50 UG/ML
25 INJECTION INTRAVENOUS
Refills: 0 | Status: DISCONTINUED | OUTPATIENT
Start: 2022-01-01 | End: 2022-01-01

## 2022-01-01 RX ORDER — FENTANYL CITRATE 50 UG/ML
1 INJECTION INTRAVENOUS
Qty: 5 | Refills: 0
Start: 2022-01-01 | End: 2022-01-01

## 2022-01-01 RX ORDER — CEFAZOLIN SODIUM 1 G
VIAL (EA) INJECTION
Refills: 0 | Status: DISCONTINUED | OUTPATIENT
Start: 2022-01-01 | End: 2022-01-01

## 2022-01-01 RX ORDER — QUETIAPINE FUMARATE 25 MG/1
25 TABLET ORAL
Qty: 180 | Refills: 3 | Status: ACTIVE | COMMUNITY
Start: 2022-01-01

## 2022-01-01 RX ORDER — NIFEDIPINE 30 MG
1 TABLET, EXTENDED RELEASE 24 HR ORAL
Qty: 0 | Refills: 0 | DISCHARGE

## 2022-01-01 RX ORDER — SEVELAMER CARBONATE 2400 MG/1
2 POWDER, FOR SUSPENSION ORAL
Qty: 0 | Refills: 0 | DISCHARGE

## 2022-01-01 RX ORDER — HYDROMORPHONE HYDROCHLORIDE 2 MG/ML
0.25 INJECTION INTRAMUSCULAR; INTRAVENOUS; SUBCUTANEOUS
Refills: 0 | Status: DISCONTINUED | OUTPATIENT
Start: 2022-01-01 | End: 2022-01-01

## 2022-01-01 RX ORDER — HYDROCORTISONE 1 %
1 OINTMENT (GRAM) TOPICAL
Qty: 60 | Refills: 0
Start: 2022-01-01 | End: 2022-01-01

## 2022-01-01 RX ORDER — DEXTROSE 50 % IN WATER 50 %
15 SYRINGE (ML) INTRAVENOUS ONCE
Refills: 0 | Status: DISCONTINUED | OUTPATIENT
Start: 2022-01-01 | End: 2022-01-01

## 2022-01-01 RX ORDER — DULOXETINE HYDROCHLORIDE 30 MG/1
30 CAPSULE, DELAYED RELEASE ORAL DAILY
Refills: 0 | Status: DISCONTINUED | OUTPATIENT
Start: 2022-01-01 | End: 2022-01-01

## 2022-01-01 RX ORDER — IBUPROFEN 200 MG
400 TABLET ORAL ONCE
Refills: 0 | Status: COMPLETED | OUTPATIENT
Start: 2022-01-01 | End: 2022-01-01

## 2022-01-01 RX ORDER — SODIUM CHLORIDE 9 MG/ML
500 INJECTION INTRAMUSCULAR; INTRAVENOUS; SUBCUTANEOUS ONCE
Refills: 0 | Status: COMPLETED | OUTPATIENT
Start: 2022-01-01 | End: 2022-01-01

## 2022-01-01 RX ORDER — KETOROLAC TROMETHAMINE 30 MG/ML
15 SYRINGE (ML) INJECTION ONCE
Refills: 0 | Status: DISCONTINUED | OUTPATIENT
Start: 2022-01-01 | End: 2022-01-01

## 2022-01-01 RX ORDER — SODIUM HYPOCHLORITE 1.25 MG/ML
0.12 SOLUTION TOPICAL
Qty: 2 | Refills: 2 | Status: ACTIVE | COMMUNITY
Start: 2022-01-01

## 2022-01-01 RX ORDER — TRAZODONE HYDROCHLORIDE 100 MG/1
100 TABLET ORAL
Qty: 90 | Refills: 0 | Status: ACTIVE | COMMUNITY
Start: 2022-01-01

## 2022-01-01 RX ORDER — CEFAZOLIN SODIUM 1 G
1000 VIAL (EA) INJECTION EVERY 24 HOURS
Refills: 0 | Status: DISCONTINUED | OUTPATIENT
Start: 2022-01-01 | End: 2022-01-01

## 2022-01-01 RX ORDER — SENNA PLUS 8.6 MG/1
2 TABLET ORAL
Qty: 60 | Refills: 0
Start: 2022-01-01 | End: 2022-01-01

## 2022-01-01 RX ORDER — NYSTATIN 500MM UNIT
500000 POWDER (EA) MISCELLANEOUS THREE TIMES A DAY
Refills: 0 | Status: DISCONTINUED | OUTPATIENT
Start: 2022-01-01 | End: 2022-01-01

## 2022-01-01 RX ORDER — TRAZODONE HCL 50 MG
1 TABLET ORAL
Qty: 15 | Refills: 0
Start: 2022-01-01 | End: 2022-07-19

## 2022-01-01 RX ORDER — ONDANSETRON 8 MG/1
4 TABLET, FILM COATED ORAL EVERY 8 HOURS
Refills: 0 | Status: DISCONTINUED | OUTPATIENT
Start: 2022-01-01 | End: 2022-01-01

## 2022-01-01 RX ORDER — DIPHENHYDRAMINE HYDROCHLORIDE AND LIDOCAINE HYDROCHLORIDE AND ALUMINUM HYDROXIDE AND MAGNESIUM HYDRO
1 KIT
Qty: 3 | Refills: 0
Start: 2022-01-01 | End: 2022-08-03

## 2022-01-01 RX ORDER — TRAZODONE HCL 50 MG
100 TABLET ORAL AT BEDTIME
Refills: 0 | Status: DISCONTINUED | OUTPATIENT
Start: 2022-01-01 | End: 2022-01-01

## 2022-01-01 RX ORDER — SEVELAMER CARBONATE 800 MG/1
800 TABLET, FILM COATED ORAL
Qty: 270 | Refills: 3 | Status: ACTIVE | COMMUNITY
Start: 2021-01-01

## 2022-01-01 RX ORDER — NYSTATIN 500MM UNIT
5 POWDER (EA) MISCELLANEOUS
Qty: 450 | Refills: 0
Start: 2022-01-01 | End: 2022-08-03

## 2022-01-01 RX ORDER — PIPERACILLIN AND TAZOBACTAM 4; .5 G/20ML; G/20ML
3.38 INJECTION, POWDER, LYOPHILIZED, FOR SOLUTION INTRAVENOUS EVERY 8 HOURS
Refills: 0 | Status: DISCONTINUED | OUTPATIENT
Start: 2022-01-01 | End: 2022-01-01

## 2022-01-01 RX ORDER — LACTULOSE 10 G/15ML
10 SOLUTION ORAL
Refills: 0 | Status: DISCONTINUED | OUTPATIENT
Start: 2022-01-01 | End: 2022-01-01

## 2022-01-01 RX ORDER — FENTANYL CITRATE 50 UG/ML
50 INJECTION INTRAVENOUS
Refills: 0 | Status: DISCONTINUED | OUTPATIENT
Start: 2022-01-01 | End: 2022-01-01

## 2022-01-01 RX ORDER — NIFEDIPINE 30 MG
90 TABLET, EXTENDED RELEASE 24 HR ORAL AT BEDTIME
Refills: 0 | Status: DISCONTINUED | OUTPATIENT
Start: 2022-01-01 | End: 2022-01-01

## 2022-01-01 RX ORDER — FENTANYL CITRATE 50 UG/ML
25 INJECTION INTRAVENOUS ONCE
Refills: 0 | Status: DISCONTINUED | OUTPATIENT
Start: 2022-01-01 | End: 2022-01-01

## 2022-01-01 RX ORDER — PANTOPRAZOLE 40 MG/1
40 TABLET, DELAYED RELEASE ORAL DAILY
Qty: 1 | Refills: 3 | Status: ACTIVE | COMMUNITY
Start: 2022-01-01

## 2022-01-01 RX ORDER — ACETAMINOPHEN 500 MG
3 TABLET ORAL
Qty: 0 | Refills: 0 | DISCHARGE
Start: 2022-01-01

## 2022-01-01 RX ORDER — CHLORHEXIDINE GLUCONATE 4 %
325 (65 FE) LIQUID (ML) TOPICAL 3 TIMES DAILY
Qty: 270 | Refills: 0 | Status: DISCONTINUED | COMMUNITY
Start: 2017-03-23 | End: 2022-01-01

## 2022-01-01 RX ORDER — POTASSIUM CHLORIDE 20 MEQ
20 PACKET (EA) ORAL ONCE
Refills: 0 | Status: DISCONTINUED | OUTPATIENT
Start: 2022-01-01 | End: 2022-01-01

## 2022-01-01 RX ORDER — HEPARIN SODIUM 5000 [USP'U]/ML
600 INJECTION INTRAVENOUS; SUBCUTANEOUS
Qty: 25000 | Refills: 0 | Status: DISCONTINUED | OUTPATIENT
Start: 2022-01-01 | End: 2022-01-01

## 2022-01-01 RX ORDER — ASCORBIC ACID 250 MG
250 TABLET,CHEWABLE ORAL DAILY
Refills: 1 | Status: ACTIVE | COMMUNITY
Start: 2017-09-22

## 2022-01-01 RX ADMIN — Medication 50 MILLILITER(S): at 10:42

## 2022-01-01 RX ADMIN — CHLORHEXIDINE GLUCONATE 1 APPLICATION(S): 213 SOLUTION TOPICAL at 17:49

## 2022-01-01 RX ADMIN — ISOSORBIDE MONONITRATE 30 MILLIGRAM(S): 60 TABLET, EXTENDED RELEASE ORAL at 13:45

## 2022-01-01 RX ADMIN — HEPARIN SODIUM 1300 UNIT(S)/HR: 5000 INJECTION INTRAVENOUS; SUBCUTANEOUS at 07:31

## 2022-01-01 RX ADMIN — Medication 1000 MILLIGRAM(S): at 23:55

## 2022-01-01 RX ADMIN — SEVELAMER CARBONATE 800 MILLIGRAM(S): 2400 POWDER, FOR SUSPENSION ORAL at 14:37

## 2022-01-01 RX ADMIN — LIDOCAINE 1 PATCH: 4 CREAM TOPICAL at 04:00

## 2022-01-01 RX ADMIN — POLYETHYLENE GLYCOL 3350 17 GRAM(S): 17 POWDER, FOR SOLUTION ORAL at 06:04

## 2022-01-01 RX ADMIN — PIPERACILLIN AND TAZOBACTAM 25 GRAM(S): 4; .5 INJECTION, POWDER, LYOPHILIZED, FOR SOLUTION INTRAVENOUS at 12:02

## 2022-01-01 RX ADMIN — SEVELAMER CARBONATE 800 MILLIGRAM(S): 2400 POWDER, FOR SUSPENSION ORAL at 06:05

## 2022-01-01 RX ADMIN — PIPERACILLIN AND TAZOBACTAM 25 GRAM(S): 4; .5 INJECTION, POWDER, LYOPHILIZED, FOR SOLUTION INTRAVENOUS at 04:00

## 2022-01-01 RX ADMIN — Medication 60 MILLIGRAM(S): at 21:52

## 2022-01-01 RX ADMIN — Medication 3 MILLIGRAM(S): at 22:30

## 2022-01-01 RX ADMIN — Medication 20 MILLIGRAM(S): at 05:56

## 2022-01-01 RX ADMIN — Medication 3 MILLIGRAM(S): at 22:10

## 2022-01-01 RX ADMIN — ERYTHROPOIETIN 15000 UNIT(S): 10000 INJECTION, SOLUTION INTRAVENOUS; SUBCUTANEOUS at 16:14

## 2022-01-01 RX ADMIN — CHLORHEXIDINE GLUCONATE 1 APPLICATION(S): 213 SOLUTION TOPICAL at 07:45

## 2022-01-01 RX ADMIN — Medication 650 MILLIGRAM(S): at 17:38

## 2022-01-01 RX ADMIN — Medication 3 MILLIGRAM(S): at 22:02

## 2022-01-01 RX ADMIN — MEROPENEM 100 MILLIGRAM(S): 1 INJECTION INTRAVENOUS at 19:00

## 2022-01-01 RX ADMIN — PANTOPRAZOLE SODIUM 40 MILLIGRAM(S): 20 TABLET, DELAYED RELEASE ORAL at 06:31

## 2022-01-01 RX ADMIN — Medication 500000 UNIT(S): at 06:22

## 2022-01-01 RX ADMIN — DULOXETINE HYDROCHLORIDE 60 MILLIGRAM(S): 30 CAPSULE, DELAYED RELEASE ORAL at 08:47

## 2022-01-01 RX ADMIN — Medication 650 MILLIGRAM(S): at 21:36

## 2022-01-01 RX ADMIN — HYDROMORPHONE HYDROCHLORIDE 2 MILLIGRAM(S): 2 INJECTION INTRAMUSCULAR; INTRAVENOUS; SUBCUTANEOUS at 12:31

## 2022-01-01 RX ADMIN — PANTOPRAZOLE SODIUM 40 MILLIGRAM(S): 20 TABLET, DELAYED RELEASE ORAL at 05:51

## 2022-01-01 RX ADMIN — PANTOPRAZOLE SODIUM 40 MILLIGRAM(S): 20 TABLET, DELAYED RELEASE ORAL at 17:38

## 2022-01-01 RX ADMIN — POLYETHYLENE GLYCOL 3350 17 GRAM(S): 17 POWDER, FOR SOLUTION ORAL at 05:51

## 2022-01-01 RX ADMIN — Medication 1000 MILLIGRAM(S): at 20:45

## 2022-01-01 RX ADMIN — CHLORHEXIDINE GLUCONATE 1 APPLICATION(S): 213 SOLUTION TOPICAL at 06:25

## 2022-01-01 RX ADMIN — HEPARIN SODIUM 5000 UNIT(S): 5000 INJECTION INTRAVENOUS; SUBCUTANEOUS at 21:35

## 2022-01-01 RX ADMIN — PANTOPRAZOLE SODIUM 40 MILLIGRAM(S): 20 TABLET, DELAYED RELEASE ORAL at 17:49

## 2022-01-01 RX ADMIN — SEVELAMER CARBONATE 800 MILLIGRAM(S): 2400 POWDER, FOR SUSPENSION ORAL at 17:11

## 2022-01-01 RX ADMIN — TAMSULOSIN HYDROCHLORIDE 0.4 MILLIGRAM(S): 0.4 CAPSULE ORAL at 22:07

## 2022-01-01 RX ADMIN — Medication 81 MILLIGRAM(S): at 08:19

## 2022-01-01 RX ADMIN — Medication 1 APPLICATION(S): at 05:27

## 2022-01-01 RX ADMIN — DIPHENHYDRAMINE HYDROCHLORIDE AND LIDOCAINE HYDROCHLORIDE AND ALUMINUM HYDROXIDE AND MAGNESIUM HYDRO 15 MILLILITER(S): KIT at 21:57

## 2022-01-01 RX ADMIN — SEVELAMER CARBONATE 800 MILLIGRAM(S): 2400 POWDER, FOR SUSPENSION ORAL at 13:40

## 2022-01-01 RX ADMIN — QUETIAPINE FUMARATE 25 MILLIGRAM(S): 200 TABLET, FILM COATED ORAL at 22:17

## 2022-01-01 RX ADMIN — TAMSULOSIN HYDROCHLORIDE 0.4 MILLIGRAM(S): 0.4 CAPSULE ORAL at 22:56

## 2022-01-01 RX ADMIN — Medication 60 MILLIGRAM(S): at 22:39

## 2022-01-01 RX ADMIN — CHLORHEXIDINE GLUCONATE 1 APPLICATION(S): 213 SOLUTION TOPICAL at 06:19

## 2022-01-01 RX ADMIN — Medication 650 MILLIGRAM(S): at 05:08

## 2022-01-01 RX ADMIN — HEPARIN SODIUM 5000 UNIT(S): 5000 INJECTION INTRAVENOUS; SUBCUTANEOUS at 17:13

## 2022-01-01 RX ADMIN — ISOSORBIDE MONONITRATE 30 MILLIGRAM(S): 60 TABLET, EXTENDED RELEASE ORAL at 16:54

## 2022-01-01 RX ADMIN — Medication 81 MILLIGRAM(S): at 16:20

## 2022-01-01 RX ADMIN — TAMSULOSIN HYDROCHLORIDE 0.4 MILLIGRAM(S): 0.4 CAPSULE ORAL at 22:32

## 2022-01-01 RX ADMIN — Medication 1 TABLET(S): at 23:21

## 2022-01-01 RX ADMIN — SEVELAMER CARBONATE 800 MILLIGRAM(S): 2400 POWDER, FOR SUSPENSION ORAL at 05:39

## 2022-01-01 RX ADMIN — Medication 650 MILLIGRAM(S): at 17:49

## 2022-01-01 RX ADMIN — Medication 1 MILLIGRAM(S): at 21:51

## 2022-01-01 RX ADMIN — SEVELAMER CARBONATE 800 MILLIGRAM(S): 2400 POWDER, FOR SUSPENSION ORAL at 17:22

## 2022-01-01 RX ADMIN — SEVELAMER CARBONATE 800 MILLIGRAM(S): 2400 POWDER, FOR SUSPENSION ORAL at 17:43

## 2022-01-01 RX ADMIN — CHLORHEXIDINE GLUCONATE 1 APPLICATION(S): 213 SOLUTION TOPICAL at 05:40

## 2022-01-01 RX ADMIN — FENTANYL CITRATE 1 PATCH: 50 INJECTION INTRAVENOUS at 12:46

## 2022-01-01 RX ADMIN — Medication 81 MILLIGRAM(S): at 13:35

## 2022-01-01 RX ADMIN — Medication 1 TABLET(S): at 11:31

## 2022-01-01 RX ADMIN — HYDROMORPHONE HYDROCHLORIDE 0.5 MILLIGRAM(S): 2 INJECTION INTRAMUSCULAR; INTRAVENOUS; SUBCUTANEOUS at 12:00

## 2022-01-01 RX ADMIN — Medication 650 MILLIGRAM(S): at 05:18

## 2022-01-01 RX ADMIN — Medication 1 TABLET(S): at 08:19

## 2022-01-01 RX ADMIN — TAMSULOSIN HYDROCHLORIDE 0.4 MILLIGRAM(S): 0.4 CAPSULE ORAL at 21:23

## 2022-01-01 RX ADMIN — TAMSULOSIN HYDROCHLORIDE 0.4 MILLIGRAM(S): 0.4 CAPSULE ORAL at 22:37

## 2022-01-01 RX ADMIN — Medication 975 MILLIGRAM(S): at 04:00

## 2022-01-01 RX ADMIN — Medication 650 MILLIGRAM(S): at 12:55

## 2022-01-01 RX ADMIN — HYDROMORPHONE HYDROCHLORIDE 2 MILLIGRAM(S): 2 INJECTION INTRAMUSCULAR; INTRAVENOUS; SUBCUTANEOUS at 01:43

## 2022-01-01 RX ADMIN — Medication 650 MILLIGRAM(S): at 06:50

## 2022-01-01 RX ADMIN — Medication 100 MILLIGRAM(S): at 23:17

## 2022-01-01 RX ADMIN — Medication 1 APPLICATION(S): at 05:10

## 2022-01-01 RX ADMIN — LIDOCAINE 1 PATCH: 4 CREAM TOPICAL at 04:58

## 2022-01-01 RX ADMIN — Medication 325 MILLIGRAM(S): at 23:20

## 2022-01-01 RX ADMIN — DULOXETINE HYDROCHLORIDE 60 MILLIGRAM(S): 30 CAPSULE, DELAYED RELEASE ORAL at 12:48

## 2022-01-01 RX ADMIN — Medication 50 MILLIGRAM(S): at 22:56

## 2022-01-01 RX ADMIN — LIDOCAINE 1 PATCH: 4 CREAM TOPICAL at 05:23

## 2022-01-01 RX ADMIN — HYDROMORPHONE HYDROCHLORIDE 0.5 MILLIGRAM(S): 2 INJECTION INTRAMUSCULAR; INTRAVENOUS; SUBCUTANEOUS at 22:15

## 2022-01-01 RX ADMIN — DIPHENHYDRAMINE HYDROCHLORIDE AND LIDOCAINE HYDROCHLORIDE AND ALUMINUM HYDROXIDE AND MAGNESIUM HYDRO 15 MILLILITER(S): KIT at 06:22

## 2022-01-01 RX ADMIN — SEVELAMER CARBONATE 800 MILLIGRAM(S): 2400 POWDER, FOR SUSPENSION ORAL at 22:42

## 2022-01-01 RX ADMIN — PIPERACILLIN AND TAZOBACTAM 25 GRAM(S): 4; .5 INJECTION, POWDER, LYOPHILIZED, FOR SOLUTION INTRAVENOUS at 05:40

## 2022-01-01 RX ADMIN — Medication 650 MILLIGRAM(S): at 08:31

## 2022-01-01 RX ADMIN — PIPERACILLIN AND TAZOBACTAM 25 GRAM(S): 4; .5 INJECTION, POWDER, LYOPHILIZED, FOR SOLUTION INTRAVENOUS at 17:43

## 2022-01-01 RX ADMIN — PIPERACILLIN AND TAZOBACTAM 25 GRAM(S): 4; .5 INJECTION, POWDER, LYOPHILIZED, FOR SOLUTION INTRAVENOUS at 05:51

## 2022-01-01 RX ADMIN — HYDROMORPHONE HYDROCHLORIDE 0.5 MILLIGRAM(S): 2 INJECTION INTRAMUSCULAR; INTRAVENOUS; SUBCUTANEOUS at 00:14

## 2022-01-01 RX ADMIN — Medication 650 MILLIGRAM(S): at 08:05

## 2022-01-01 RX ADMIN — Medication 1 SUPPOSITORY(S): at 12:37

## 2022-01-01 RX ADMIN — ATORVASTATIN CALCIUM 80 MILLIGRAM(S): 80 TABLET, FILM COATED ORAL at 00:44

## 2022-01-01 RX ADMIN — Medication 650 MILLIGRAM(S): at 04:02

## 2022-01-01 RX ADMIN — HYDROMORPHONE HYDROCHLORIDE 0.5 MILLIGRAM(S): 2 INJECTION INTRAMUSCULAR; INTRAVENOUS; SUBCUTANEOUS at 09:25

## 2022-01-01 RX ADMIN — Medication 650 MILLIGRAM(S): at 18:15

## 2022-01-01 RX ADMIN — PANTOPRAZOLE SODIUM 40 MILLIGRAM(S): 20 TABLET, DELAYED RELEASE ORAL at 04:43

## 2022-01-01 RX ADMIN — HYDROMORPHONE HYDROCHLORIDE 0.5 MILLIGRAM(S): 2 INJECTION INTRAMUSCULAR; INTRAVENOUS; SUBCUTANEOUS at 00:50

## 2022-01-01 RX ADMIN — POLYETHYLENE GLYCOL 3350 17 GRAM(S): 17 POWDER, FOR SOLUTION ORAL at 06:31

## 2022-01-01 RX ADMIN — DIPHENHYDRAMINE HYDROCHLORIDE AND LIDOCAINE HYDROCHLORIDE AND ALUMINUM HYDROXIDE AND MAGNESIUM HYDRO 15 MILLILITER(S): KIT at 16:25

## 2022-01-01 RX ADMIN — HEPARIN SODIUM 1400 UNIT(S)/HR: 5000 INJECTION INTRAVENOUS; SUBCUTANEOUS at 06:55

## 2022-01-01 RX ADMIN — ISOSORBIDE MONONITRATE 30 MILLIGRAM(S): 60 TABLET, EXTENDED RELEASE ORAL at 12:36

## 2022-01-01 RX ADMIN — Medication 3 MILLIGRAM(S): at 22:37

## 2022-01-01 RX ADMIN — ATORVASTATIN CALCIUM 80 MILLIGRAM(S): 80 TABLET, FILM COATED ORAL at 22:33

## 2022-01-01 RX ADMIN — MIDODRINE HYDROCHLORIDE 10 MILLIGRAM(S): 2.5 TABLET ORAL at 18:16

## 2022-01-01 RX ADMIN — APIXABAN 2.5 MILLIGRAM(S): 2.5 TABLET, FILM COATED ORAL at 05:58

## 2022-01-01 RX ADMIN — PANTOPRAZOLE SODIUM 40 MILLIGRAM(S): 20 TABLET, DELAYED RELEASE ORAL at 06:18

## 2022-01-01 RX ADMIN — Medication 975 MILLIGRAM(S): at 19:20

## 2022-01-01 RX ADMIN — PANTOPRAZOLE SODIUM 40 MILLIGRAM(S): 20 TABLET, DELAYED RELEASE ORAL at 17:25

## 2022-01-01 RX ADMIN — FENTANYL CITRATE 1 PATCH: 50 INJECTION INTRAVENOUS at 07:00

## 2022-01-01 RX ADMIN — Medication 650 MILLIGRAM(S): at 09:52

## 2022-01-01 RX ADMIN — ENOXAPARIN SODIUM 68 MILLIGRAM(S): 100 INJECTION SUBCUTANEOUS at 13:32

## 2022-01-01 RX ADMIN — Medication 400 MILLIGRAM(S): at 21:03

## 2022-01-01 RX ADMIN — ISOSORBIDE MONONITRATE 30 MILLIGRAM(S): 60 TABLET, EXTENDED RELEASE ORAL at 17:44

## 2022-01-01 RX ADMIN — FENTANYL CITRATE 1 PATCH: 50 INJECTION INTRAVENOUS at 18:06

## 2022-01-01 RX ADMIN — Medication 81 MILLIGRAM(S): at 14:21

## 2022-01-01 RX ADMIN — Medication 90 MILLIGRAM(S): at 05:12

## 2022-01-01 RX ADMIN — ISOSORBIDE MONONITRATE 30 MILLIGRAM(S): 60 TABLET, EXTENDED RELEASE ORAL at 12:09

## 2022-01-01 RX ADMIN — HEPARIN SODIUM 5000 UNIT(S): 5000 INJECTION INTRAVENOUS; SUBCUTANEOUS at 13:53

## 2022-01-01 RX ADMIN — Medication 100 MILLIGRAM(S): at 22:26

## 2022-01-01 RX ADMIN — FENTANYL CITRATE 1 PATCH: 50 INJECTION INTRAVENOUS at 07:41

## 2022-01-01 RX ADMIN — PANTOPRAZOLE SODIUM 40 MILLIGRAM(S): 20 TABLET, DELAYED RELEASE ORAL at 17:48

## 2022-01-01 RX ADMIN — Medication 650 MILLIGRAM(S): at 17:34

## 2022-01-01 RX ADMIN — LIDOCAINE 1 PATCH: 4 CREAM TOPICAL at 17:07

## 2022-01-01 RX ADMIN — Medication 975 MILLIGRAM(S): at 14:43

## 2022-01-01 RX ADMIN — SEVELAMER CARBONATE 800 MILLIGRAM(S): 2400 POWDER, FOR SUSPENSION ORAL at 05:54

## 2022-01-01 RX ADMIN — Medication 3 MILLIGRAM(S): at 22:20

## 2022-01-01 RX ADMIN — SEVELAMER CARBONATE 800 MILLIGRAM(S): 2400 POWDER, FOR SUSPENSION ORAL at 23:07

## 2022-01-01 RX ADMIN — PANTOPRAZOLE SODIUM 40 MILLIGRAM(S): 20 TABLET, DELAYED RELEASE ORAL at 05:45

## 2022-01-01 RX ADMIN — CHLORHEXIDINE GLUCONATE 1 APPLICATION(S): 213 SOLUTION TOPICAL at 05:54

## 2022-01-01 RX ADMIN — Medication 3 MILLIGRAM(S): at 21:38

## 2022-01-01 RX ADMIN — Medication 1 TABLET(S): at 23:33

## 2022-01-01 RX ADMIN — Medication 400 MILLIGRAM(S): at 22:00

## 2022-01-01 RX ADMIN — HEPARIN SODIUM 5000 UNIT(S): 5000 INJECTION INTRAVENOUS; SUBCUTANEOUS at 14:46

## 2022-01-01 RX ADMIN — Medication 650 MILLIGRAM(S): at 22:26

## 2022-01-01 RX ADMIN — PANTOPRAZOLE SODIUM 40 MILLIGRAM(S): 20 TABLET, DELAYED RELEASE ORAL at 09:32

## 2022-01-01 RX ADMIN — Medication 650 MILLIGRAM(S): at 05:51

## 2022-01-01 RX ADMIN — FENTANYL CITRATE 1 PATCH: 50 INJECTION INTRAVENOUS at 08:19

## 2022-01-01 RX ADMIN — ATORVASTATIN CALCIUM 80 MILLIGRAM(S): 80 TABLET, FILM COATED ORAL at 21:32

## 2022-01-01 RX ADMIN — HEPARIN SODIUM 5000 UNIT(S): 5000 INJECTION INTRAVENOUS; SUBCUTANEOUS at 06:14

## 2022-01-01 RX ADMIN — PANTOPRAZOLE SODIUM 40 MILLIGRAM(S): 20 TABLET, DELAYED RELEASE ORAL at 06:02

## 2022-01-01 RX ADMIN — ERYTHROPOIETIN 10000 UNIT(S): 10000 INJECTION, SOLUTION INTRAVENOUS; SUBCUTANEOUS at 11:25

## 2022-01-01 RX ADMIN — HEPARIN SODIUM 5000 UNIT(S): 5000 INJECTION INTRAVENOUS; SUBCUTANEOUS at 12:55

## 2022-01-01 RX ADMIN — SEVELAMER CARBONATE 800 MILLIGRAM(S): 2400 POWDER, FOR SUSPENSION ORAL at 21:36

## 2022-01-01 RX ADMIN — FENTANYL CITRATE 1 PATCH: 50 INJECTION INTRAVENOUS at 08:41

## 2022-01-01 RX ADMIN — Medication 1 TABLET(S): at 08:54

## 2022-01-01 RX ADMIN — Medication 1 SUPPOSITORY(S): at 05:27

## 2022-01-01 RX ADMIN — Medication 100 MILLIGRAM(S): at 17:20

## 2022-01-01 RX ADMIN — HEPARIN SODIUM 1200 UNIT(S)/HR: 5000 INJECTION INTRAVENOUS; SUBCUTANEOUS at 13:36

## 2022-01-01 RX ADMIN — PIPERACILLIN AND TAZOBACTAM 25 GRAM(S): 4; .5 INJECTION, POWDER, LYOPHILIZED, FOR SOLUTION INTRAVENOUS at 17:47

## 2022-01-01 RX ADMIN — HEPARIN SODIUM 5000 UNIT(S): 5000 INJECTION INTRAVENOUS; SUBCUTANEOUS at 14:03

## 2022-01-01 RX ADMIN — HYDROMORPHONE HYDROCHLORIDE 0.5 MILLIGRAM(S): 2 INJECTION INTRAMUSCULAR; INTRAVENOUS; SUBCUTANEOUS at 02:19

## 2022-01-01 RX ADMIN — SEVELAMER CARBONATE 800 MILLIGRAM(S): 2400 POWDER, FOR SUSPENSION ORAL at 14:30

## 2022-01-01 RX ADMIN — Medication 1 TABLET(S): at 11:40

## 2022-01-01 RX ADMIN — TAMSULOSIN HYDROCHLORIDE 0.4 MILLIGRAM(S): 0.4 CAPSULE ORAL at 23:31

## 2022-01-01 RX ADMIN — PANTOPRAZOLE SODIUM 10 MG/HR: 20 TABLET, DELAYED RELEASE ORAL at 22:06

## 2022-01-01 RX ADMIN — Medication 650 MILLIGRAM(S): at 11:52

## 2022-01-01 RX ADMIN — SENNA PLUS 2 TABLET(S): 8.6 TABLET ORAL at 22:30

## 2022-01-01 RX ADMIN — CHLORHEXIDINE GLUCONATE 1 APPLICATION(S): 213 SOLUTION TOPICAL at 06:02

## 2022-01-01 RX ADMIN — PANTOPRAZOLE SODIUM 40 MILLIGRAM(S): 20 TABLET, DELAYED RELEASE ORAL at 05:26

## 2022-01-01 RX ADMIN — ATORVASTATIN CALCIUM 80 MILLIGRAM(S): 80 TABLET, FILM COATED ORAL at 21:03

## 2022-01-01 RX ADMIN — ERYTHROPOIETIN 10000 UNIT(S): 10000 INJECTION, SOLUTION INTRAVENOUS; SUBCUTANEOUS at 10:10

## 2022-01-01 RX ADMIN — ATORVASTATIN CALCIUM 80 MILLIGRAM(S): 80 TABLET, FILM COATED ORAL at 21:12

## 2022-01-01 RX ADMIN — Medication 25 GRAM(S): at 13:46

## 2022-01-01 RX ADMIN — CHLORHEXIDINE GLUCONATE 1 APPLICATION(S): 213 SOLUTION TOPICAL at 05:11

## 2022-01-01 RX ADMIN — SEVELAMER CARBONATE 800 MILLIGRAM(S): 2400 POWDER, FOR SUSPENSION ORAL at 06:47

## 2022-01-01 RX ADMIN — Medication 650 MILLIGRAM(S): at 14:10

## 2022-01-01 RX ADMIN — HEPARIN SODIUM 5000 UNIT(S): 5000 INJECTION INTRAVENOUS; SUBCUTANEOUS at 05:28

## 2022-01-01 RX ADMIN — SEVELAMER CARBONATE 800 MILLIGRAM(S): 2400 POWDER, FOR SUSPENSION ORAL at 21:59

## 2022-01-01 RX ADMIN — SEVELAMER CARBONATE 800 MILLIGRAM(S): 2400 POWDER, FOR SUSPENSION ORAL at 13:54

## 2022-01-01 RX ADMIN — HYDROMORPHONE HYDROCHLORIDE 0.5 MILLIGRAM(S): 2 INJECTION INTRAMUSCULAR; INTRAVENOUS; SUBCUTANEOUS at 01:14

## 2022-01-01 RX ADMIN — Medication 81 MILLIGRAM(S): at 17:44

## 2022-01-01 RX ADMIN — HEPARIN SODIUM 1500 UNIT(S)/HR: 5000 INJECTION INTRAVENOUS; SUBCUTANEOUS at 14:37

## 2022-01-01 RX ADMIN — Medication 90 MILLIGRAM(S): at 06:15

## 2022-01-01 RX ADMIN — Medication 1 TABLET(S): at 11:57

## 2022-01-01 RX ADMIN — HEPARIN SODIUM 5000 UNIT(S): 5000 INJECTION INTRAVENOUS; SUBCUTANEOUS at 17:08

## 2022-01-01 RX ADMIN — SEVELAMER CARBONATE 800 MILLIGRAM(S): 2400 POWDER, FOR SUSPENSION ORAL at 05:12

## 2022-01-01 RX ADMIN — Medication 650 MILLIGRAM(S): at 13:53

## 2022-01-01 RX ADMIN — Medication 650 MILLIGRAM(S): at 20:38

## 2022-01-01 RX ADMIN — HYDROMORPHONE HYDROCHLORIDE 0.5 MILLIGRAM(S): 2 INJECTION INTRAMUSCULAR; INTRAVENOUS; SUBCUTANEOUS at 23:57

## 2022-01-01 RX ADMIN — SENNA PLUS 2 TABLET(S): 8.6 TABLET ORAL at 21:32

## 2022-01-01 RX ADMIN — Medication 90 MILLIGRAM(S): at 05:54

## 2022-01-01 RX ADMIN — HYDROMORPHONE HYDROCHLORIDE 0.5 MILLIGRAM(S): 2 INJECTION INTRAMUSCULAR; INTRAVENOUS; SUBCUTANEOUS at 04:35

## 2022-01-01 RX ADMIN — HEPARIN SODIUM 1200 UNIT(S)/HR: 5000 INJECTION INTRAVENOUS; SUBCUTANEOUS at 07:59

## 2022-01-01 RX ADMIN — HYDROMORPHONE HYDROCHLORIDE 0.5 MILLIGRAM(S): 2 INJECTION INTRAMUSCULAR; INTRAVENOUS; SUBCUTANEOUS at 09:55

## 2022-01-01 RX ADMIN — LIDOCAINE 1 PATCH: 4 CREAM TOPICAL at 16:03

## 2022-01-01 RX ADMIN — PANTOPRAZOLE SODIUM 40 MILLIGRAM(S): 20 TABLET, DELAYED RELEASE ORAL at 06:45

## 2022-01-01 RX ADMIN — HEPARIN SODIUM 1200 UNIT(S)/HR: 5000 INJECTION INTRAVENOUS; SUBCUTANEOUS at 23:28

## 2022-01-01 RX ADMIN — Medication 400 MILLIGRAM(S): at 11:44

## 2022-01-01 RX ADMIN — Medication 650 MILLIGRAM(S): at 23:32

## 2022-01-01 RX ADMIN — HYDROMORPHONE HYDROCHLORIDE 0.5 MILLIGRAM(S): 2 INJECTION INTRAMUSCULAR; INTRAVENOUS; SUBCUTANEOUS at 20:12

## 2022-01-01 RX ADMIN — Medication 650 MILLIGRAM(S): at 18:51

## 2022-01-01 RX ADMIN — QUETIAPINE FUMARATE 25 MILLIGRAM(S): 200 TABLET, FILM COATED ORAL at 23:08

## 2022-01-01 RX ADMIN — HYDROMORPHONE HYDROCHLORIDE 0.5 MILLIGRAM(S): 2 INJECTION INTRAMUSCULAR; INTRAVENOUS; SUBCUTANEOUS at 03:13

## 2022-01-01 RX ADMIN — Medication 1 TABLET(S): at 12:32

## 2022-01-01 RX ADMIN — PIPERACILLIN AND TAZOBACTAM 25 GRAM(S): 4; .5 INJECTION, POWDER, LYOPHILIZED, FOR SOLUTION INTRAVENOUS at 14:20

## 2022-01-01 RX ADMIN — Medication 400 MILLIGRAM(S): at 16:54

## 2022-01-01 RX ADMIN — SEVELAMER CARBONATE 800 MILLIGRAM(S): 2400 POWDER, FOR SUSPENSION ORAL at 11:53

## 2022-01-01 RX ADMIN — Medication 650 MILLIGRAM(S): at 18:00

## 2022-01-01 RX ADMIN — PANTOPRAZOLE SODIUM 40 MILLIGRAM(S): 20 TABLET, DELAYED RELEASE ORAL at 17:15

## 2022-01-01 RX ADMIN — OXYCODONE HYDROCHLORIDE 10 MILLIGRAM(S): 5 TABLET ORAL at 23:40

## 2022-01-01 RX ADMIN — SEVELAMER CARBONATE 800 MILLIGRAM(S): 2400 POWDER, FOR SUSPENSION ORAL at 05:27

## 2022-01-01 RX ADMIN — SEVELAMER CARBONATE 800 MILLIGRAM(S): 2400 POWDER, FOR SUSPENSION ORAL at 22:13

## 2022-01-01 RX ADMIN — Medication 3 MILLIGRAM(S): at 21:39

## 2022-01-01 RX ADMIN — QUETIAPINE FUMARATE 25 MILLIGRAM(S): 200 TABLET, FILM COATED ORAL at 21:40

## 2022-01-01 RX ADMIN — HEPARIN SODIUM 5000 UNIT(S): 5000 INJECTION INTRAVENOUS; SUBCUTANEOUS at 06:27

## 2022-01-01 RX ADMIN — Medication 1 APPLICATION(S): at 17:52

## 2022-01-01 RX ADMIN — Medication 975 MILLIGRAM(S): at 05:10

## 2022-01-01 RX ADMIN — SEVELAMER CARBONATE 800 MILLIGRAM(S): 2400 POWDER, FOR SUSPENSION ORAL at 22:59

## 2022-01-01 RX ADMIN — HEPARIN SODIUM 1300 UNIT(S)/HR: 5000 INJECTION INTRAVENOUS; SUBCUTANEOUS at 05:32

## 2022-01-01 RX ADMIN — Medication 100 MILLIGRAM(S): at 17:45

## 2022-01-01 RX ADMIN — PANTOPRAZOLE SODIUM 40 MILLIGRAM(S): 20 TABLET, DELAYED RELEASE ORAL at 06:20

## 2022-01-01 RX ADMIN — LIDOCAINE 1 PATCH: 4 CREAM TOPICAL at 06:10

## 2022-01-01 RX ADMIN — TAMSULOSIN HYDROCHLORIDE 0.4 MILLIGRAM(S): 0.4 CAPSULE ORAL at 21:35

## 2022-01-01 RX ADMIN — Medication 650 MILLIGRAM(S): at 18:30

## 2022-01-01 RX ADMIN — Medication 50 MILLIGRAM(S): at 21:25

## 2022-01-01 RX ADMIN — LIDOCAINE 1 PATCH: 4 CREAM TOPICAL at 12:43

## 2022-01-01 RX ADMIN — HEPARIN SODIUM 5000 UNIT(S): 5000 INJECTION INTRAVENOUS; SUBCUTANEOUS at 05:50

## 2022-01-01 RX ADMIN — APIXABAN 2.5 MILLIGRAM(S): 2.5 TABLET, FILM COATED ORAL at 17:29

## 2022-01-01 RX ADMIN — SEVELAMER CARBONATE 800 MILLIGRAM(S): 2400 POWDER, FOR SUSPENSION ORAL at 12:38

## 2022-01-01 RX ADMIN — SODIUM CHLORIDE 1000 MILLILITER(S): 9 INJECTION INTRAMUSCULAR; INTRAVENOUS; SUBCUTANEOUS at 04:50

## 2022-01-01 RX ADMIN — Medication 81 MILLIGRAM(S): at 17:45

## 2022-01-01 RX ADMIN — HEPARIN SODIUM 5000 UNIT(S): 5000 INJECTION INTRAVENOUS; SUBCUTANEOUS at 23:35

## 2022-01-01 RX ADMIN — PIPERACILLIN AND TAZOBACTAM 25 GRAM(S): 4; .5 INJECTION, POWDER, LYOPHILIZED, FOR SOLUTION INTRAVENOUS at 18:17

## 2022-01-01 RX ADMIN — QUETIAPINE FUMARATE 25 MILLIGRAM(S): 200 TABLET, FILM COATED ORAL at 22:24

## 2022-01-01 RX ADMIN — ERYTHROPOIETIN 10000 UNIT(S): 10000 INJECTION, SOLUTION INTRAVENOUS; SUBCUTANEOUS at 13:35

## 2022-01-01 RX ADMIN — ISOSORBIDE MONONITRATE 30 MILLIGRAM(S): 60 TABLET, EXTENDED RELEASE ORAL at 11:37

## 2022-01-01 RX ADMIN — PIPERACILLIN AND TAZOBACTAM 25 GRAM(S): 4; .5 INJECTION, POWDER, LYOPHILIZED, FOR SOLUTION INTRAVENOUS at 06:18

## 2022-01-01 RX ADMIN — Medication 400 MILLIGRAM(S): at 21:39

## 2022-01-01 RX ADMIN — QUETIAPINE FUMARATE 25 MILLIGRAM(S): 200 TABLET, FILM COATED ORAL at 22:03

## 2022-01-01 RX ADMIN — Medication 20 MILLIEQUIVALENT(S): at 11:31

## 2022-01-01 RX ADMIN — Medication 81 MILLIGRAM(S): at 16:24

## 2022-01-01 RX ADMIN — ISOSORBIDE MONONITRATE 30 MILLIGRAM(S): 60 TABLET, EXTENDED RELEASE ORAL at 16:21

## 2022-01-01 RX ADMIN — SEVELAMER CARBONATE 800 MILLIGRAM(S): 2400 POWDER, FOR SUSPENSION ORAL at 15:40

## 2022-01-01 RX ADMIN — Medication 81 MILLIGRAM(S): at 11:53

## 2022-01-01 RX ADMIN — PANTOPRAZOLE SODIUM 40 MILLIGRAM(S): 20 TABLET, DELAYED RELEASE ORAL at 18:57

## 2022-01-01 RX ADMIN — FENTANYL CITRATE 1 PATCH: 50 INJECTION INTRAVENOUS at 11:43

## 2022-01-01 RX ADMIN — ISOSORBIDE MONONITRATE 30 MILLIGRAM(S): 60 TABLET, EXTENDED RELEASE ORAL at 13:30

## 2022-01-01 RX ADMIN — TAMSULOSIN HYDROCHLORIDE 0.4 MILLIGRAM(S): 0.4 CAPSULE ORAL at 22:02

## 2022-01-01 RX ADMIN — Medication 975 MILLIGRAM(S): at 16:42

## 2022-01-01 RX ADMIN — QUETIAPINE FUMARATE 25 MILLIGRAM(S): 200 TABLET, FILM COATED ORAL at 21:42

## 2022-01-01 RX ADMIN — Medication 60 MILLIGRAM(S): at 06:21

## 2022-01-01 RX ADMIN — PANTOPRAZOLE SODIUM 40 MILLIGRAM(S): 20 TABLET, DELAYED RELEASE ORAL at 17:52

## 2022-01-01 RX ADMIN — Medication 1 TABLET(S): at 22:16

## 2022-01-01 RX ADMIN — Medication 1000 MILLIGRAM(S): at 23:17

## 2022-01-01 RX ADMIN — HEPARIN SODIUM 5000 UNIT(S): 5000 INJECTION INTRAVENOUS; SUBCUTANEOUS at 08:01

## 2022-01-01 RX ADMIN — Medication 1000 MILLIGRAM(S): at 05:55

## 2022-01-01 RX ADMIN — SEVELAMER CARBONATE 800 MILLIGRAM(S): 2400 POWDER, FOR SUSPENSION ORAL at 21:31

## 2022-01-01 RX ADMIN — Medication 400 MILLIGRAM(S): at 22:54

## 2022-01-01 RX ADMIN — PANTOPRAZOLE SODIUM 40 MILLIGRAM(S): 20 TABLET, DELAYED RELEASE ORAL at 18:30

## 2022-01-01 RX ADMIN — ERYTHROPOIETIN 10000 UNIT(S): 10000 INJECTION, SOLUTION INTRAVENOUS; SUBCUTANEOUS at 14:31

## 2022-01-01 RX ADMIN — Medication 3 MILLIGRAM(S): at 21:22

## 2022-01-01 RX ADMIN — Medication 90 MILLIGRAM(S): at 06:24

## 2022-01-01 RX ADMIN — SEVELAMER CARBONATE 800 MILLIGRAM(S): 2400 POWDER, FOR SUSPENSION ORAL at 13:31

## 2022-01-01 RX ADMIN — DULOXETINE HYDROCHLORIDE 60 MILLIGRAM(S): 30 CAPSULE, DELAYED RELEASE ORAL at 16:32

## 2022-01-01 RX ADMIN — SEVELAMER CARBONATE 800 MILLIGRAM(S): 2400 POWDER, FOR SUSPENSION ORAL at 21:45

## 2022-01-01 RX ADMIN — Medication 1 TABLET(S): at 23:48

## 2022-01-01 RX ADMIN — Medication 650 MILLIGRAM(S): at 19:14

## 2022-01-01 RX ADMIN — Medication 90 MILLIGRAM(S): at 05:21

## 2022-01-01 RX ADMIN — CHLORHEXIDINE GLUCONATE 1 APPLICATION(S): 213 SOLUTION TOPICAL at 08:33

## 2022-01-01 RX ADMIN — HEPARIN SODIUM 5000 UNIT(S): 5000 INJECTION INTRAVENOUS; SUBCUTANEOUS at 18:19

## 2022-01-01 RX ADMIN — PANTOPRAZOLE SODIUM 40 MILLIGRAM(S): 20 TABLET, DELAYED RELEASE ORAL at 06:24

## 2022-01-01 RX ADMIN — TAMSULOSIN HYDROCHLORIDE 0.4 MILLIGRAM(S): 0.4 CAPSULE ORAL at 23:07

## 2022-01-01 RX ADMIN — HYDROMORPHONE HYDROCHLORIDE 0.25 MILLIGRAM(S): 2 INJECTION INTRAMUSCULAR; INTRAVENOUS; SUBCUTANEOUS at 04:00

## 2022-01-01 RX ADMIN — Medication 50 MILLIGRAM(S): at 23:32

## 2022-01-01 RX ADMIN — PIPERACILLIN AND TAZOBACTAM 200 GRAM(S): 4; .5 INJECTION, POWDER, LYOPHILIZED, FOR SOLUTION INTRAVENOUS at 15:33

## 2022-01-01 RX ADMIN — CHLORHEXIDINE GLUCONATE 1 APPLICATION(S): 213 SOLUTION TOPICAL at 05:56

## 2022-01-01 RX ADMIN — Medication 1000 MILLIGRAM(S): at 12:00

## 2022-01-01 RX ADMIN — Medication 20 MILLIGRAM(S): at 08:17

## 2022-01-01 RX ADMIN — HYDROMORPHONE HYDROCHLORIDE 2 MILLIGRAM(S): 2 INJECTION INTRAMUSCULAR; INTRAVENOUS; SUBCUTANEOUS at 05:00

## 2022-01-01 RX ADMIN — Medication 1 TABLET(S): at 23:00

## 2022-01-01 RX ADMIN — Medication 650 MILLIGRAM(S): at 23:47

## 2022-01-01 RX ADMIN — SEVELAMER CARBONATE 800 MILLIGRAM(S): 2400 POWDER, FOR SUSPENSION ORAL at 05:57

## 2022-01-01 RX ADMIN — SEVELAMER CARBONATE 800 MILLIGRAM(S): 2400 POWDER, FOR SUSPENSION ORAL at 22:20

## 2022-01-01 RX ADMIN — HEPARIN SODIUM 5000 UNIT(S): 5000 INJECTION INTRAVENOUS; SUBCUTANEOUS at 05:14

## 2022-01-01 RX ADMIN — ISOSORBIDE MONONITRATE 30 MILLIGRAM(S): 60 TABLET, EXTENDED RELEASE ORAL at 23:01

## 2022-01-01 RX ADMIN — Medication 100 MILLIGRAM(S): at 17:46

## 2022-01-01 RX ADMIN — FENTANYL CITRATE 1 PATCH: 50 INJECTION INTRAVENOUS at 14:09

## 2022-01-01 RX ADMIN — Medication 650 MILLIGRAM(S): at 15:15

## 2022-01-01 RX ADMIN — Medication 90 MILLIGRAM(S): at 06:53

## 2022-01-01 RX ADMIN — Medication 650 MILLIGRAM(S): at 12:01

## 2022-01-01 RX ADMIN — HEPARIN SODIUM 5000 UNIT(S): 5000 INJECTION INTRAVENOUS; SUBCUTANEOUS at 06:15

## 2022-01-01 RX ADMIN — HEPARIN SODIUM 1200 UNIT(S)/HR: 5000 INJECTION INTRAVENOUS; SUBCUTANEOUS at 03:29

## 2022-01-01 RX ADMIN — PANTOPRAZOLE SODIUM 40 MILLIGRAM(S): 20 TABLET, DELAYED RELEASE ORAL at 05:11

## 2022-01-01 RX ADMIN — SEVELAMER CARBONATE 800 MILLIGRAM(S): 2400 POWDER, FOR SUSPENSION ORAL at 21:28

## 2022-01-01 RX ADMIN — LIDOCAINE 1 PATCH: 4 CREAM TOPICAL at 16:39

## 2022-01-01 RX ADMIN — Medication 20 MILLIGRAM(S): at 05:25

## 2022-01-01 RX ADMIN — SEVELAMER CARBONATE 800 MILLIGRAM(S): 2400 POWDER, FOR SUSPENSION ORAL at 22:39

## 2022-01-01 RX ADMIN — Medication 650 MILLIGRAM(S): at 15:00

## 2022-01-01 RX ADMIN — HYDROMORPHONE HYDROCHLORIDE 0.5 MILLIGRAM(S): 2 INJECTION INTRAMUSCULAR; INTRAVENOUS; SUBCUTANEOUS at 21:51

## 2022-01-01 RX ADMIN — CHLORHEXIDINE GLUCONATE 1 APPLICATION(S): 213 SOLUTION TOPICAL at 05:25

## 2022-01-01 RX ADMIN — Medication 1 APPLICATION(S): at 22:04

## 2022-01-01 RX ADMIN — ISOSORBIDE MONONITRATE 30 MILLIGRAM(S): 60 TABLET, EXTENDED RELEASE ORAL at 12:51

## 2022-01-01 RX ADMIN — OXYCODONE HYDROCHLORIDE 5 MILLIGRAM(S): 5 TABLET ORAL at 17:45

## 2022-01-01 RX ADMIN — SEVELAMER CARBONATE 800 MILLIGRAM(S): 2400 POWDER, FOR SUSPENSION ORAL at 06:52

## 2022-01-01 RX ADMIN — HEPARIN SODIUM 5000 UNIT(S): 5000 INJECTION INTRAVENOUS; SUBCUTANEOUS at 06:31

## 2022-01-01 RX ADMIN — Medication 1 APPLICATION(S): at 21:40

## 2022-01-01 RX ADMIN — Medication 1 TABLET(S): at 21:42

## 2022-01-01 RX ADMIN — DIPHENHYDRAMINE HYDROCHLORIDE AND LIDOCAINE HYDROCHLORIDE AND ALUMINUM HYDROXIDE AND MAGNESIUM HYDRO 15 MILLILITER(S): KIT at 23:01

## 2022-01-01 RX ADMIN — Medication 15 MILLIGRAM(S): at 15:14

## 2022-01-01 RX ADMIN — PANTOPRAZOLE SODIUM 40 MILLIGRAM(S): 20 TABLET, DELAYED RELEASE ORAL at 06:36

## 2022-01-01 RX ADMIN — SEVELAMER CARBONATE 800 MILLIGRAM(S): 2400 POWDER, FOR SUSPENSION ORAL at 05:25

## 2022-01-01 RX ADMIN — SEVELAMER CARBONATE 800 MILLIGRAM(S): 2400 POWDER, FOR SUSPENSION ORAL at 22:10

## 2022-01-01 RX ADMIN — HYDROMORPHONE HYDROCHLORIDE 0.25 MILLIGRAM(S): 2 INJECTION INTRAMUSCULAR; INTRAVENOUS; SUBCUTANEOUS at 00:54

## 2022-01-01 RX ADMIN — Medication 650 MILLIGRAM(S): at 07:04

## 2022-01-01 RX ADMIN — Medication 1 TABLET(S): at 15:02

## 2022-01-01 RX ADMIN — Medication 3 MILLIGRAM(S): at 22:59

## 2022-01-01 RX ADMIN — Medication 1 APPLICATION(S): at 05:57

## 2022-01-01 RX ADMIN — LIDOCAINE 1 PATCH: 4 CREAM TOPICAL at 18:52

## 2022-01-01 RX ADMIN — Medication 1 APPLICATION(S): at 21:51

## 2022-01-01 RX ADMIN — Medication 975 MILLIGRAM(S): at 21:19

## 2022-01-01 RX ADMIN — Medication 400 MILLIGRAM(S): at 22:56

## 2022-01-01 RX ADMIN — SEVELAMER CARBONATE 800 MILLIGRAM(S): 2400 POWDER, FOR SUSPENSION ORAL at 06:17

## 2022-01-01 RX ADMIN — ISOSORBIDE MONONITRATE 30 MILLIGRAM(S): 60 TABLET, EXTENDED RELEASE ORAL at 10:14

## 2022-01-01 RX ADMIN — LIDOCAINE 1 PATCH: 4 CREAM TOPICAL at 06:33

## 2022-01-01 RX ADMIN — FENTANYL CITRATE 1 PATCH: 50 INJECTION INTRAVENOUS at 14:30

## 2022-01-01 RX ADMIN — Medication 81 MILLIGRAM(S): at 11:42

## 2022-01-01 RX ADMIN — Medication 1 TABLET(S): at 22:55

## 2022-01-01 RX ADMIN — Medication 975 MILLIGRAM(S): at 12:59

## 2022-01-01 RX ADMIN — Medication 3 MILLIGRAM(S): at 21:12

## 2022-01-01 RX ADMIN — SEVELAMER CARBONATE 800 MILLIGRAM(S): 2400 POWDER, FOR SUSPENSION ORAL at 08:01

## 2022-01-01 RX ADMIN — Medication 20 MILLIGRAM(S): at 05:27

## 2022-01-01 RX ADMIN — LIDOCAINE 1 PATCH: 4 CREAM TOPICAL at 17:10

## 2022-01-01 RX ADMIN — FENTANYL CITRATE 1 PATCH: 50 INJECTION INTRAVENOUS at 18:20

## 2022-01-01 RX ADMIN — HEPARIN SODIUM 1300 UNIT(S)/HR: 5000 INJECTION INTRAVENOUS; SUBCUTANEOUS at 08:06

## 2022-01-01 RX ADMIN — Medication 975 MILLIGRAM(S): at 06:30

## 2022-01-01 RX ADMIN — CHLORHEXIDINE GLUCONATE 1 APPLICATION(S): 213 SOLUTION TOPICAL at 08:18

## 2022-01-01 RX ADMIN — ISOSORBIDE MONONITRATE 30 MILLIGRAM(S): 60 TABLET, EXTENDED RELEASE ORAL at 16:25

## 2022-01-01 RX ADMIN — TAMSULOSIN HYDROCHLORIDE 0.4 MILLIGRAM(S): 0.4 CAPSULE ORAL at 21:32

## 2022-01-01 RX ADMIN — Medication 0.5 MILLIGRAM(S): at 16:28

## 2022-01-01 RX ADMIN — SEVELAMER CARBONATE 800 MILLIGRAM(S): 2400 POWDER, FOR SUSPENSION ORAL at 00:44

## 2022-01-01 RX ADMIN — Medication 400 MILLIGRAM(S): at 12:06

## 2022-01-01 RX ADMIN — Medication 650 MILLIGRAM(S): at 04:44

## 2022-01-01 RX ADMIN — Medication 650 MILLIGRAM(S): at 14:51

## 2022-01-01 RX ADMIN — Medication 1000 MILLIGRAM(S): at 01:00

## 2022-01-01 RX ADMIN — Medication 975 MILLIGRAM(S): at 17:00

## 2022-01-01 RX ADMIN — Medication 200 MILLIGRAM(S): at 16:44

## 2022-01-01 RX ADMIN — SEVELAMER CARBONATE 800 MILLIGRAM(S): 2400 POWDER, FOR SUSPENSION ORAL at 17:24

## 2022-01-01 RX ADMIN — Medication 20 MILLIGRAM(S): at 21:43

## 2022-01-01 RX ADMIN — PANTOPRAZOLE SODIUM 40 MILLIGRAM(S): 20 TABLET, DELAYED RELEASE ORAL at 05:23

## 2022-01-01 RX ADMIN — Medication 650 MILLIGRAM(S): at 17:22

## 2022-01-01 RX ADMIN — POLYETHYLENE GLYCOL 3350 17 GRAM(S): 17 POWDER, FOR SOLUTION ORAL at 05:46

## 2022-01-01 RX ADMIN — Medication 90 MILLIGRAM(S): at 06:20

## 2022-01-01 RX ADMIN — ATORVASTATIN CALCIUM 80 MILLIGRAM(S): 80 TABLET, FILM COATED ORAL at 22:35

## 2022-01-01 RX ADMIN — Medication 15 MILLIGRAM(S): at 23:10

## 2022-01-01 RX ADMIN — ISOSORBIDE MONONITRATE 30 MILLIGRAM(S): 60 TABLET, EXTENDED RELEASE ORAL at 13:10

## 2022-01-01 RX ADMIN — Medication 650 MILLIGRAM(S): at 06:27

## 2022-01-01 RX ADMIN — Medication 3 MILLIGRAM(S): at 21:42

## 2022-01-01 RX ADMIN — PANTOPRAZOLE SODIUM 40 MILLIGRAM(S): 20 TABLET, DELAYED RELEASE ORAL at 06:16

## 2022-01-01 RX ADMIN — HYDROMORPHONE HYDROCHLORIDE 0.5 MILLIGRAM(S): 2 INJECTION INTRAMUSCULAR; INTRAVENOUS; SUBCUTANEOUS at 02:49

## 2022-01-01 RX ADMIN — PIPERACILLIN AND TAZOBACTAM 25 GRAM(S): 4; .5 INJECTION, POWDER, LYOPHILIZED, FOR SOLUTION INTRAVENOUS at 06:15

## 2022-01-01 RX ADMIN — CHLORHEXIDINE GLUCONATE 1 APPLICATION(S): 213 SOLUTION TOPICAL at 05:46

## 2022-01-01 RX ADMIN — SEVELAMER CARBONATE 800 MILLIGRAM(S): 2400 POWDER, FOR SUSPENSION ORAL at 05:52

## 2022-01-01 RX ADMIN — HYDROMORPHONE HYDROCHLORIDE 0.5 MILLIGRAM(S): 2 INJECTION INTRAMUSCULAR; INTRAVENOUS; SUBCUTANEOUS at 00:46

## 2022-01-01 RX ADMIN — QUETIAPINE FUMARATE 25 MILLIGRAM(S): 200 TABLET, FILM COATED ORAL at 22:56

## 2022-01-01 RX ADMIN — APIXABAN 2.5 MILLIGRAM(S): 2.5 TABLET, FILM COATED ORAL at 18:57

## 2022-01-01 RX ADMIN — SEVELAMER CARBONATE 800 MILLIGRAM(S): 2400 POWDER, FOR SUSPENSION ORAL at 21:18

## 2022-01-01 RX ADMIN — Medication 650 MILLIGRAM(S): at 05:49

## 2022-01-01 RX ADMIN — FENTANYL CITRATE 1 PATCH: 50 INJECTION INTRAVENOUS at 19:00

## 2022-01-01 RX ADMIN — Medication 650 MILLIGRAM(S): at 09:31

## 2022-01-01 RX ADMIN — HYDROMORPHONE HYDROCHLORIDE 0.5 MILLIGRAM(S): 2 INJECTION INTRAMUSCULAR; INTRAVENOUS; SUBCUTANEOUS at 02:35

## 2022-01-01 RX ADMIN — SENNA PLUS 2 TABLET(S): 8.6 TABLET ORAL at 21:30

## 2022-01-01 RX ADMIN — Medication 81 MILLIGRAM(S): at 18:19

## 2022-01-01 RX ADMIN — CHLORHEXIDINE GLUCONATE 1 APPLICATION(S): 213 SOLUTION TOPICAL at 07:44

## 2022-01-01 RX ADMIN — HEPARIN SODIUM 5000 UNIT(S): 5000 INJECTION INTRAVENOUS; SUBCUTANEOUS at 23:24

## 2022-01-01 RX ADMIN — ISOSORBIDE MONONITRATE 30 MILLIGRAM(S): 60 TABLET, EXTENDED RELEASE ORAL at 11:07

## 2022-01-01 RX ADMIN — CHLORHEXIDINE GLUCONATE 1 APPLICATION(S): 213 SOLUTION TOPICAL at 12:30

## 2022-01-01 RX ADMIN — DULOXETINE HYDROCHLORIDE 60 MILLIGRAM(S): 30 CAPSULE, DELAYED RELEASE ORAL at 18:25

## 2022-01-01 RX ADMIN — Medication 90 MILLIGRAM(S): at 06:31

## 2022-01-01 RX ADMIN — ALTEPLASE 2 MILLIGRAM(S): KIT at 13:37

## 2022-01-01 RX ADMIN — ATORVASTATIN CALCIUM 80 MILLIGRAM(S): 80 TABLET, FILM COATED ORAL at 21:38

## 2022-01-01 RX ADMIN — Medication 1000 MILLIGRAM(S): at 16:41

## 2022-01-01 RX ADMIN — Medication 81 MILLIGRAM(S): at 13:10

## 2022-01-01 RX ADMIN — Medication 1 TABLET(S): at 12:01

## 2022-01-01 RX ADMIN — Medication 20 MILLIGRAM(S): at 07:52

## 2022-01-01 RX ADMIN — HEPARIN SODIUM 5000 UNIT(S): 5000 INJECTION INTRAVENOUS; SUBCUTANEOUS at 06:21

## 2022-01-01 RX ADMIN — Medication 90 MILLIGRAM(S): at 22:18

## 2022-01-01 RX ADMIN — HYDROMORPHONE HYDROCHLORIDE 1 MILLIGRAM(S): 2 INJECTION INTRAMUSCULAR; INTRAVENOUS; SUBCUTANEOUS at 03:30

## 2022-01-01 RX ADMIN — PANTOPRAZOLE SODIUM 40 MILLIGRAM(S): 20 TABLET, DELAYED RELEASE ORAL at 06:21

## 2022-01-01 RX ADMIN — SEVELAMER CARBONATE 800 MILLIGRAM(S): 2400 POWDER, FOR SUSPENSION ORAL at 15:57

## 2022-01-01 RX ADMIN — HEPARIN SODIUM 1400 UNIT(S)/HR: 5000 INJECTION INTRAVENOUS; SUBCUTANEOUS at 21:16

## 2022-01-01 RX ADMIN — Medication 650 MILLIGRAM(S): at 12:06

## 2022-01-01 RX ADMIN — Medication 81 MILLIGRAM(S): at 18:22

## 2022-01-01 RX ADMIN — Medication 5 MILLIGRAM(S): at 22:42

## 2022-01-01 RX ADMIN — Medication 5 MILLIGRAM(S): at 22:32

## 2022-01-01 RX ADMIN — Medication 81 MILLIGRAM(S): at 11:40

## 2022-01-01 RX ADMIN — Medication 3 MILLIGRAM(S): at 22:39

## 2022-01-01 RX ADMIN — PANTOPRAZOLE SODIUM 40 MILLIGRAM(S): 20 TABLET, DELAYED RELEASE ORAL at 18:00

## 2022-01-01 RX ADMIN — CHLORHEXIDINE GLUCONATE 1 APPLICATION(S): 213 SOLUTION TOPICAL at 05:10

## 2022-01-01 RX ADMIN — Medication 1000 MILLIGRAM(S): at 12:36

## 2022-01-01 RX ADMIN — LIDOCAINE 1 PATCH: 4 CREAM TOPICAL at 21:29

## 2022-01-01 RX ADMIN — ISOSORBIDE MONONITRATE 30 MILLIGRAM(S): 60 TABLET, EXTENDED RELEASE ORAL at 18:22

## 2022-01-01 RX ADMIN — PIPERACILLIN AND TAZOBACTAM 25 GRAM(S): 4; .5 INJECTION, POWDER, LYOPHILIZED, FOR SOLUTION INTRAVENOUS at 17:55

## 2022-01-01 RX ADMIN — POLYETHYLENE GLYCOL 3350 17 GRAM(S): 17 POWDER, FOR SOLUTION ORAL at 18:05

## 2022-01-01 RX ADMIN — Medication 3 MILLIGRAM(S): at 22:40

## 2022-01-01 RX ADMIN — CHLORHEXIDINE GLUCONATE 1 APPLICATION(S): 213 SOLUTION TOPICAL at 05:08

## 2022-01-01 RX ADMIN — SEVELAMER CARBONATE 800 MILLIGRAM(S): 2400 POWDER, FOR SUSPENSION ORAL at 13:30

## 2022-01-01 RX ADMIN — HYDROMORPHONE HYDROCHLORIDE 0.25 MILLIGRAM(S): 2 INJECTION INTRAMUSCULAR; INTRAVENOUS; SUBCUTANEOUS at 03:46

## 2022-01-01 RX ADMIN — ISOSORBIDE MONONITRATE 30 MILLIGRAM(S): 60 TABLET, EXTENDED RELEASE ORAL at 12:56

## 2022-01-01 RX ADMIN — Medication 3 MILLIGRAM(S): at 22:33

## 2022-01-01 RX ADMIN — Medication 90 MILLIGRAM(S): at 22:30

## 2022-01-01 RX ADMIN — Medication 3 MILLIGRAM(S): at 21:29

## 2022-01-01 RX ADMIN — Medication 90 MILLIGRAM(S): at 04:46

## 2022-01-01 RX ADMIN — Medication 650 MILLIGRAM(S): at 22:30

## 2022-01-01 RX ADMIN — FENTANYL CITRATE 1 PATCH: 50 INJECTION INTRAVENOUS at 14:45

## 2022-01-01 RX ADMIN — HEPARIN SODIUM 1300 UNIT(S)/HR: 5000 INJECTION INTRAVENOUS; SUBCUTANEOUS at 08:08

## 2022-01-01 RX ADMIN — POLYETHYLENE GLYCOL 3350 17 GRAM(S): 17 POWDER, FOR SOLUTION ORAL at 05:06

## 2022-01-01 RX ADMIN — HEPARIN SODIUM 5000 UNIT(S): 5000 INJECTION INTRAVENOUS; SUBCUTANEOUS at 17:09

## 2022-01-01 RX ADMIN — SODIUM CHLORIDE 40 MILLILITER(S): 9 INJECTION, SOLUTION INTRAVENOUS at 01:30

## 2022-01-01 RX ADMIN — TAMSULOSIN HYDROCHLORIDE 0.4 MILLIGRAM(S): 0.4 CAPSULE ORAL at 22:06

## 2022-01-01 RX ADMIN — Medication 975 MILLIGRAM(S): at 22:34

## 2022-01-01 RX ADMIN — FENTANYL CITRATE 25 MICROGRAM(S): 50 INJECTION INTRAVENOUS at 10:00

## 2022-01-01 RX ADMIN — PIPERACILLIN AND TAZOBACTAM 200 GRAM(S): 4; .5 INJECTION, POWDER, LYOPHILIZED, FOR SOLUTION INTRAVENOUS at 02:55

## 2022-01-01 RX ADMIN — FENTANYL CITRATE 1 PATCH: 50 INJECTION INTRAVENOUS at 09:57

## 2022-01-01 RX ADMIN — HEPARIN SODIUM 5000 UNIT(S): 5000 INJECTION INTRAVENOUS; SUBCUTANEOUS at 05:10

## 2022-01-01 RX ADMIN — SEVELAMER CARBONATE 800 MILLIGRAM(S): 2400 POWDER, FOR SUSPENSION ORAL at 16:21

## 2022-01-01 RX ADMIN — PANTOPRAZOLE SODIUM 40 MILLIGRAM(S): 20 TABLET, DELAYED RELEASE ORAL at 05:12

## 2022-01-01 RX ADMIN — FENTANYL CITRATE 1 PATCH: 50 INJECTION INTRAVENOUS at 17:45

## 2022-01-01 RX ADMIN — Medication 3 MILLIGRAM(S): at 21:35

## 2022-01-01 RX ADMIN — Medication 20 MILLIGRAM(S): at 05:52

## 2022-01-01 RX ADMIN — Medication 400 MILLIGRAM(S): at 20:17

## 2022-01-01 RX ADMIN — TAMSULOSIN HYDROCHLORIDE 0.4 MILLIGRAM(S): 0.4 CAPSULE ORAL at 22:03

## 2022-01-01 RX ADMIN — Medication 650 MILLIGRAM(S): at 17:07

## 2022-01-01 RX ADMIN — Medication 81 MILLIGRAM(S): at 14:37

## 2022-01-01 RX ADMIN — Medication 1 TABLET(S): at 14:11

## 2022-01-01 RX ADMIN — ATORVASTATIN CALCIUM 80 MILLIGRAM(S): 80 TABLET, FILM COATED ORAL at 22:24

## 2022-01-01 RX ADMIN — Medication 15 MILLIGRAM(S): at 23:50

## 2022-01-01 RX ADMIN — ISOSORBIDE MONONITRATE 30 MILLIGRAM(S): 60 TABLET, EXTENDED RELEASE ORAL at 17:46

## 2022-01-01 RX ADMIN — Medication 650 MILLIGRAM(S): at 18:07

## 2022-01-01 RX ADMIN — Medication 81 MILLIGRAM(S): at 11:57

## 2022-01-01 RX ADMIN — FENTANYL CITRATE 1 PATCH: 50 INJECTION INTRAVENOUS at 22:32

## 2022-01-01 RX ADMIN — Medication 1 TABLET(S): at 22:23

## 2022-01-01 RX ADMIN — HEPARIN SODIUM 1500 UNIT(S)/HR: 5000 INJECTION INTRAVENOUS; SUBCUTANEOUS at 05:50

## 2022-01-01 RX ADMIN — PIPERACILLIN AND TAZOBACTAM 25 GRAM(S): 4; .5 INJECTION, POWDER, LYOPHILIZED, FOR SOLUTION INTRAVENOUS at 14:02

## 2022-01-01 RX ADMIN — Medication 1 TABLET(S): at 11:19

## 2022-01-01 RX ADMIN — Medication 650 MILLIGRAM(S): at 17:25

## 2022-01-01 RX ADMIN — ATORVASTATIN CALCIUM 80 MILLIGRAM(S): 80 TABLET, FILM COATED ORAL at 21:26

## 2022-01-01 RX ADMIN — TAMSULOSIN HYDROCHLORIDE 0.4 MILLIGRAM(S): 0.4 CAPSULE ORAL at 22:19

## 2022-01-01 RX ADMIN — Medication 1 TABLET(S): at 11:07

## 2022-01-01 RX ADMIN — Medication 1 APPLICATION(S): at 22:06

## 2022-01-01 RX ADMIN — OXYCODONE HYDROCHLORIDE 10 MILLIGRAM(S): 5 TABLET ORAL at 22:40

## 2022-01-01 RX ADMIN — Medication 650 MILLIGRAM(S): at 23:01

## 2022-01-01 RX ADMIN — ISOSORBIDE MONONITRATE 30 MILLIGRAM(S): 60 TABLET, EXTENDED RELEASE ORAL at 12:48

## 2022-01-01 RX ADMIN — SEVELAMER CARBONATE 800 MILLIGRAM(S): 2400 POWDER, FOR SUSPENSION ORAL at 13:19

## 2022-01-01 RX ADMIN — TAMSULOSIN HYDROCHLORIDE 0.4 MILLIGRAM(S): 0.4 CAPSULE ORAL at 21:30

## 2022-01-01 RX ADMIN — Medication 650 MILLIGRAM(S): at 18:44

## 2022-01-01 RX ADMIN — HEPARIN SODIUM 1200 UNIT(S)/HR: 5000 INJECTION INTRAVENOUS; SUBCUTANEOUS at 10:23

## 2022-01-01 RX ADMIN — HYDROMORPHONE HYDROCHLORIDE 0.25 MILLIGRAM(S): 2 INJECTION INTRAMUSCULAR; INTRAVENOUS; SUBCUTANEOUS at 12:21

## 2022-01-01 RX ADMIN — Medication 1 TABLET(S): at 13:45

## 2022-01-01 RX ADMIN — Medication 1 TABLET(S): at 18:22

## 2022-01-01 RX ADMIN — Medication 500000 UNIT(S): at 21:57

## 2022-01-01 RX ADMIN — Medication 1000 MILLIGRAM(S): at 12:58

## 2022-01-01 RX ADMIN — HEPARIN SODIUM 5000 UNIT(S): 5000 INJECTION INTRAVENOUS; SUBCUTANEOUS at 21:31

## 2022-01-01 RX ADMIN — Medication 20 MILLIGRAM(S): at 05:22

## 2022-01-01 RX ADMIN — SENNA PLUS 2 TABLET(S): 8.6 TABLET ORAL at 22:40

## 2022-01-01 RX ADMIN — QUETIAPINE FUMARATE 25 MILLIGRAM(S): 200 TABLET, FILM COATED ORAL at 21:38

## 2022-01-01 RX ADMIN — Medication 650 MILLIGRAM(S): at 05:47

## 2022-01-01 RX ADMIN — SEVELAMER CARBONATE 800 MILLIGRAM(S): 2400 POWDER, FOR SUSPENSION ORAL at 22:55

## 2022-01-01 RX ADMIN — Medication 15 MILLIGRAM(S): at 00:56

## 2022-01-01 RX ADMIN — Medication 650 MILLIGRAM(S): at 22:16

## 2022-01-01 RX ADMIN — ATORVASTATIN CALCIUM 80 MILLIGRAM(S): 80 TABLET, FILM COATED ORAL at 22:01

## 2022-01-01 RX ADMIN — Medication 81 MILLIGRAM(S): at 11:19

## 2022-01-01 RX ADMIN — ATORVASTATIN CALCIUM 80 MILLIGRAM(S): 80 TABLET, FILM COATED ORAL at 22:56

## 2022-01-01 RX ADMIN — ERYTHROPOIETIN 10000 UNIT(S): 10000 INJECTION, SOLUTION INTRAVENOUS; SUBCUTANEOUS at 09:49

## 2022-01-01 RX ADMIN — Medication 1 TABLET(S): at 21:39

## 2022-01-01 RX ADMIN — CHLORHEXIDINE GLUCONATE 1 APPLICATION(S): 213 SOLUTION TOPICAL at 18:42

## 2022-01-01 RX ADMIN — Medication 81 MILLIGRAM(S): at 16:39

## 2022-01-01 RX ADMIN — PIPERACILLIN AND TAZOBACTAM 25 GRAM(S): 4; .5 INJECTION, POWDER, LYOPHILIZED, FOR SOLUTION INTRAVENOUS at 23:22

## 2022-01-01 RX ADMIN — ISOSORBIDE MONONITRATE 30 MILLIGRAM(S): 60 TABLET, EXTENDED RELEASE ORAL at 12:32

## 2022-01-01 RX ADMIN — TAMSULOSIN HYDROCHLORIDE 0.4 MILLIGRAM(S): 0.4 CAPSULE ORAL at 21:50

## 2022-01-01 RX ADMIN — Medication 650 MILLIGRAM(S): at 06:07

## 2022-01-01 RX ADMIN — POLYETHYLENE GLYCOL 3350 17 GRAM(S): 17 POWDER, FOR SOLUTION ORAL at 17:48

## 2022-01-01 RX ADMIN — Medication 650 MILLIGRAM(S): at 06:57

## 2022-01-01 RX ADMIN — Medication 81 MILLIGRAM(S): at 12:09

## 2022-01-01 RX ADMIN — Medication 650 MILLIGRAM(S): at 08:19

## 2022-01-01 RX ADMIN — PANTOPRAZOLE SODIUM 40 MILLIGRAM(S): 20 TABLET, DELAYED RELEASE ORAL at 06:22

## 2022-01-01 RX ADMIN — TAMSULOSIN HYDROCHLORIDE 0.4 MILLIGRAM(S): 0.4 CAPSULE ORAL at 21:36

## 2022-01-01 RX ADMIN — Medication 975 MILLIGRAM(S): at 13:50

## 2022-01-01 RX ADMIN — Medication 650 MILLIGRAM(S): at 11:32

## 2022-01-01 RX ADMIN — Medication 650 MILLIGRAM(S): at 14:30

## 2022-01-01 RX ADMIN — ATORVASTATIN CALCIUM 80 MILLIGRAM(S): 80 TABLET, FILM COATED ORAL at 22:32

## 2022-01-01 RX ADMIN — Medication 15 MILLIGRAM(S): at 14:59

## 2022-01-01 RX ADMIN — HYDROMORPHONE HYDROCHLORIDE 0.5 MILLIGRAM(S): 2 INJECTION INTRAMUSCULAR; INTRAVENOUS; SUBCUTANEOUS at 02:00

## 2022-01-01 RX ADMIN — Medication 975 MILLIGRAM(S): at 05:00

## 2022-01-01 RX ADMIN — Medication 1 TABLET(S): at 11:37

## 2022-01-01 RX ADMIN — SEVELAMER CARBONATE 800 MILLIGRAM(S): 2400 POWDER, FOR SUSPENSION ORAL at 11:51

## 2022-01-01 RX ADMIN — Medication 1 TABLET(S): at 12:09

## 2022-01-01 RX ADMIN — TAMSULOSIN HYDROCHLORIDE 0.4 MILLIGRAM(S): 0.4 CAPSULE ORAL at 22:35

## 2022-01-01 RX ADMIN — Medication 1000 MILLIGRAM(S): at 16:34

## 2022-01-01 RX ADMIN — HYDROMORPHONE HYDROCHLORIDE 0.5 MILLIGRAM(S): 2 INJECTION INTRAMUSCULAR; INTRAVENOUS; SUBCUTANEOUS at 08:09

## 2022-01-01 RX ADMIN — PANTOPRAZOLE SODIUM 40 MILLIGRAM(S): 20 TABLET, DELAYED RELEASE ORAL at 10:21

## 2022-01-01 RX ADMIN — PANTOPRAZOLE SODIUM 40 MILLIGRAM(S): 20 TABLET, DELAYED RELEASE ORAL at 06:14

## 2022-01-01 RX ADMIN — Medication 400 MILLIGRAM(S): at 15:13

## 2022-01-01 RX ADMIN — HEPARIN SODIUM 5000 UNIT(S): 5000 INJECTION INTRAVENOUS; SUBCUTANEOUS at 14:50

## 2022-01-01 RX ADMIN — Medication 650 MILLIGRAM(S): at 22:23

## 2022-01-01 RX ADMIN — Medication 500000 UNIT(S): at 23:01

## 2022-01-01 RX ADMIN — CHLORHEXIDINE GLUCONATE 1 APPLICATION(S): 213 SOLUTION TOPICAL at 05:19

## 2022-01-01 RX ADMIN — HEPARIN SODIUM 1300 UNIT(S)/HR: 5000 INJECTION INTRAVENOUS; SUBCUTANEOUS at 19:52

## 2022-01-01 RX ADMIN — SODIUM CHLORIDE 100 MILLILITER(S): 9 INJECTION, SOLUTION INTRAVENOUS at 09:43

## 2022-01-01 RX ADMIN — Medication 60 MILLIGRAM(S): at 05:58

## 2022-01-01 RX ADMIN — Medication 81 MILLIGRAM(S): at 12:17

## 2022-01-01 RX ADMIN — PANTOPRAZOLE SODIUM 40 MILLIGRAM(S): 20 TABLET, DELAYED RELEASE ORAL at 17:57

## 2022-01-01 RX ADMIN — Medication 90 MILLIGRAM(S): at 05:55

## 2022-01-01 RX ADMIN — LIDOCAINE 1 PATCH: 4 CREAM TOPICAL at 17:14

## 2022-01-01 RX ADMIN — ATORVASTATIN CALCIUM 80 MILLIGRAM(S): 80 TABLET, FILM COATED ORAL at 20:20

## 2022-01-01 RX ADMIN — Medication 100 MILLIGRAM(S): at 17:16

## 2022-01-01 RX ADMIN — Medication 650 MILLIGRAM(S): at 06:34

## 2022-01-01 RX ADMIN — HYDROMORPHONE HYDROCHLORIDE 0.5 MILLIGRAM(S): 2 INJECTION INTRAMUSCULAR; INTRAVENOUS; SUBCUTANEOUS at 05:00

## 2022-01-01 RX ADMIN — Medication 650 MILLIGRAM(S): at 20:19

## 2022-01-01 RX ADMIN — SEVELAMER CARBONATE 800 MILLIGRAM(S): 2400 POWDER, FOR SUSPENSION ORAL at 11:32

## 2022-01-01 RX ADMIN — ISOSORBIDE MONONITRATE 30 MILLIGRAM(S): 60 TABLET, EXTENDED RELEASE ORAL at 23:07

## 2022-01-01 RX ADMIN — FENTANYL CITRATE 1 PATCH: 50 INJECTION INTRAVENOUS at 14:49

## 2022-01-01 RX ADMIN — Medication 15 MILLIGRAM(S): at 21:12

## 2022-01-01 RX ADMIN — Medication 15 MILLIGRAM(S): at 22:10

## 2022-01-01 RX ADMIN — Medication 3 MILLIGRAM(S): at 22:15

## 2022-01-01 RX ADMIN — Medication 1 TABLET(S): at 08:47

## 2022-01-01 RX ADMIN — FENTANYL CITRATE 1 PATCH: 50 INJECTION INTRAVENOUS at 20:14

## 2022-01-01 RX ADMIN — HEPARIN SODIUM 5000 UNIT(S): 5000 INJECTION INTRAVENOUS; SUBCUTANEOUS at 14:01

## 2022-01-01 RX ADMIN — CHLORHEXIDINE GLUCONATE 1 APPLICATION(S): 213 SOLUTION TOPICAL at 06:55

## 2022-01-01 RX ADMIN — Medication 50 MILLIGRAM(S): at 23:01

## 2022-01-01 RX ADMIN — Medication 250 MILLIGRAM(S): at 04:01

## 2022-01-01 RX ADMIN — Medication 650 MILLIGRAM(S): at 19:49

## 2022-01-01 RX ADMIN — SEVELAMER CARBONATE 800 MILLIGRAM(S): 2400 POWDER, FOR SUSPENSION ORAL at 12:18

## 2022-01-01 RX ADMIN — Medication 650 MILLIGRAM(S): at 06:28

## 2022-01-01 RX ADMIN — Medication 1 TABLET(S): at 10:20

## 2022-01-01 RX ADMIN — FENTANYL CITRATE 1 PATCH: 50 INJECTION INTRAVENOUS at 07:17

## 2022-01-01 RX ADMIN — FENTANYL CITRATE 1 PATCH: 50 INJECTION INTRAVENOUS at 14:52

## 2022-01-01 RX ADMIN — ISOSORBIDE MONONITRATE 30 MILLIGRAM(S): 60 TABLET, EXTENDED RELEASE ORAL at 13:35

## 2022-01-01 RX ADMIN — Medication 1 TABLET(S): at 11:52

## 2022-01-01 RX ADMIN — Medication 650 MILLIGRAM(S): at 12:52

## 2022-01-01 RX ADMIN — Medication 3 MILLIGRAM(S): at 22:14

## 2022-01-01 RX ADMIN — PANTOPRAZOLE SODIUM 40 MILLIGRAM(S): 20 TABLET, DELAYED RELEASE ORAL at 18:18

## 2022-01-01 RX ADMIN — ATORVASTATIN CALCIUM 80 MILLIGRAM(S): 80 TABLET, FILM COATED ORAL at 22:03

## 2022-01-01 RX ADMIN — Medication 3 MILLIGRAM(S): at 22:06

## 2022-01-01 RX ADMIN — SEVELAMER CARBONATE 800 MILLIGRAM(S): 2400 POWDER, FOR SUSPENSION ORAL at 22:34

## 2022-01-01 RX ADMIN — Medication 1 SUPPOSITORY(S): at 05:57

## 2022-01-01 RX ADMIN — QUETIAPINE FUMARATE 25 MILLIGRAM(S): 200 TABLET, FILM COATED ORAL at 23:21

## 2022-01-01 RX ADMIN — TAMSULOSIN HYDROCHLORIDE 0.4 MILLIGRAM(S): 0.4 CAPSULE ORAL at 21:27

## 2022-01-01 RX ADMIN — FENTANYL CITRATE 1 PATCH: 50 INJECTION INTRAVENOUS at 19:39

## 2022-01-01 RX ADMIN — Medication 90 MILLIGRAM(S): at 06:02

## 2022-01-01 RX ADMIN — HYDROMORPHONE HYDROCHLORIDE 2 MILLIGRAM(S): 2 INJECTION INTRAMUSCULAR; INTRAVENOUS; SUBCUTANEOUS at 12:07

## 2022-01-01 RX ADMIN — ISOSORBIDE MONONITRATE 30 MILLIGRAM(S): 60 TABLET, EXTENDED RELEASE ORAL at 08:19

## 2022-01-01 RX ADMIN — Medication 3 MILLIGRAM(S): at 23:41

## 2022-01-01 RX ADMIN — Medication 50 MILLIGRAM(S): at 23:22

## 2022-01-01 RX ADMIN — Medication 5 MILLIGRAM(S): at 21:50

## 2022-01-01 RX ADMIN — PANTOPRAZOLE SODIUM 40 MILLIGRAM(S): 20 TABLET, DELAYED RELEASE ORAL at 06:27

## 2022-01-01 RX ADMIN — SENNA PLUS 2 TABLET(S): 8.6 TABLET ORAL at 23:17

## 2022-01-01 RX ADMIN — Medication 325 MILLIGRAM(S): at 00:20

## 2022-01-01 RX ADMIN — MEROPENEM 100 MILLIGRAM(S): 1 INJECTION INTRAVENOUS at 17:37

## 2022-01-01 RX ADMIN — APIXABAN 2.5 MILLIGRAM(S): 2.5 TABLET, FILM COATED ORAL at 06:19

## 2022-01-01 RX ADMIN — Medication 650 MILLIGRAM(S): at 17:10

## 2022-01-01 RX ADMIN — LIDOCAINE 1 PATCH: 4 CREAM TOPICAL at 20:43

## 2022-01-01 RX ADMIN — HYDROMORPHONE HYDROCHLORIDE 0.5 MILLIGRAM(S): 2 INJECTION INTRAMUSCULAR; INTRAVENOUS; SUBCUTANEOUS at 00:06

## 2022-01-01 RX ADMIN — SEVELAMER CARBONATE 800 MILLIGRAM(S): 2400 POWDER, FOR SUSPENSION ORAL at 08:37

## 2022-01-01 RX ADMIN — HEPARIN SODIUM 1200 UNIT(S)/HR: 5000 INJECTION INTRAVENOUS; SUBCUTANEOUS at 07:33

## 2022-01-01 RX ADMIN — ERYTHROPOIETIN 10000 UNIT(S): 10000 INJECTION, SOLUTION INTRAVENOUS; SUBCUTANEOUS at 17:59

## 2022-01-01 RX ADMIN — SEVELAMER CARBONATE 800 MILLIGRAM(S): 2400 POWDER, FOR SUSPENSION ORAL at 21:43

## 2022-01-01 RX ADMIN — PANTOPRAZOLE SODIUM 40 MILLIGRAM(S): 20 TABLET, DELAYED RELEASE ORAL at 05:49

## 2022-01-01 RX ADMIN — Medication 81 MILLIGRAM(S): at 12:23

## 2022-01-01 RX ADMIN — HYDROMORPHONE HYDROCHLORIDE 0.5 MILLIGRAM(S): 2 INJECTION INTRAMUSCULAR; INTRAVENOUS; SUBCUTANEOUS at 08:24

## 2022-01-01 RX ADMIN — HYDROMORPHONE HYDROCHLORIDE 2 MILLIGRAM(S): 2 INJECTION INTRAMUSCULAR; INTRAVENOUS; SUBCUTANEOUS at 22:05

## 2022-01-01 RX ADMIN — Medication 650 MILLIGRAM(S): at 07:45

## 2022-01-01 RX ADMIN — Medication 81 MILLIGRAM(S): at 12:06

## 2022-01-01 RX ADMIN — Medication 975 MILLIGRAM(S): at 10:53

## 2022-01-01 RX ADMIN — ISOSORBIDE MONONITRATE 30 MILLIGRAM(S): 60 TABLET, EXTENDED RELEASE ORAL at 11:16

## 2022-01-01 RX ADMIN — LIDOCAINE 1 PATCH: 4 CREAM TOPICAL at 19:52

## 2022-01-01 RX ADMIN — Medication 975 MILLIGRAM(S): at 16:44

## 2022-01-01 RX ADMIN — HYDROMORPHONE HYDROCHLORIDE 2 MILLIGRAM(S): 2 INJECTION INTRAMUSCULAR; INTRAVENOUS; SUBCUTANEOUS at 23:02

## 2022-01-01 RX ADMIN — SEVELAMER CARBONATE 800 MILLIGRAM(S): 2400 POWDER, FOR SUSPENSION ORAL at 05:21

## 2022-01-01 RX ADMIN — FENTANYL CITRATE 1 PATCH: 50 INJECTION INTRAVENOUS at 07:54

## 2022-01-01 RX ADMIN — SEVELAMER CARBONATE 800 MILLIGRAM(S): 2400 POWDER, FOR SUSPENSION ORAL at 06:24

## 2022-01-01 RX ADMIN — CHLORHEXIDINE GLUCONATE 1 APPLICATION(S): 213 SOLUTION TOPICAL at 05:58

## 2022-01-01 RX ADMIN — Medication 90 MILLIGRAM(S): at 05:25

## 2022-01-01 RX ADMIN — HEPARIN SODIUM 5000 UNIT(S): 5000 INJECTION INTRAVENOUS; SUBCUTANEOUS at 17:03

## 2022-01-01 RX ADMIN — FENTANYL CITRATE 1 PATCH: 50 INJECTION INTRAVENOUS at 22:16

## 2022-01-01 RX ADMIN — HYDROMORPHONE HYDROCHLORIDE 0.5 MILLIGRAM(S): 2 INJECTION INTRAMUSCULAR; INTRAVENOUS; SUBCUTANEOUS at 01:53

## 2022-01-01 RX ADMIN — PANTOPRAZOLE SODIUM 40 MILLIGRAM(S): 20 TABLET, DELAYED RELEASE ORAL at 06:53

## 2022-01-01 RX ADMIN — SEVELAMER CARBONATE 800 MILLIGRAM(S): 2400 POWDER, FOR SUSPENSION ORAL at 05:56

## 2022-01-01 RX ADMIN — HEPARIN SODIUM 5000 UNIT(S): 5000 INJECTION INTRAVENOUS; SUBCUTANEOUS at 05:38

## 2022-01-01 RX ADMIN — HEPARIN SODIUM 1200 UNIT(S)/HR: 5000 INJECTION INTRAVENOUS; SUBCUTANEOUS at 07:53

## 2022-01-01 RX ADMIN — CHLORHEXIDINE GLUCONATE 1 APPLICATION(S): 213 SOLUTION TOPICAL at 14:48

## 2022-01-01 RX ADMIN — SEVELAMER CARBONATE 800 MILLIGRAM(S): 2400 POWDER, FOR SUSPENSION ORAL at 09:18

## 2022-01-01 RX ADMIN — FENTANYL CITRATE 1 PATCH: 50 INJECTION INTRAVENOUS at 12:45

## 2022-01-01 RX ADMIN — SEVELAMER CARBONATE 800 MILLIGRAM(S): 2400 POWDER, FOR SUSPENSION ORAL at 14:05

## 2022-01-01 RX ADMIN — HEPARIN SODIUM 5000 UNIT(S): 5000 INJECTION INTRAVENOUS; SUBCUTANEOUS at 22:29

## 2022-01-01 RX ADMIN — HEPARIN SODIUM 1200 UNIT(S)/HR: 5000 INJECTION INTRAVENOUS; SUBCUTANEOUS at 18:10

## 2022-01-01 RX ADMIN — CHLORHEXIDINE GLUCONATE 1 APPLICATION(S): 213 SOLUTION TOPICAL at 05:23

## 2022-01-01 RX ADMIN — SEVELAMER CARBONATE 800 MILLIGRAM(S): 2400 POWDER, FOR SUSPENSION ORAL at 22:37

## 2022-01-01 RX ADMIN — PANTOPRAZOLE SODIUM 40 MILLIGRAM(S): 20 TABLET, DELAYED RELEASE ORAL at 05:06

## 2022-01-01 RX ADMIN — HEPARIN SODIUM 1400 UNIT(S)/HR: 5000 INJECTION INTRAVENOUS; SUBCUTANEOUS at 14:09

## 2022-01-01 RX ADMIN — HEPARIN SODIUM 5000 UNIT(S): 5000 INJECTION INTRAVENOUS; SUBCUTANEOUS at 06:03

## 2022-01-01 RX ADMIN — HYDROMORPHONE HYDROCHLORIDE 0.25 MILLIGRAM(S): 2 INJECTION INTRAMUSCULAR; INTRAVENOUS; SUBCUTANEOUS at 01:07

## 2022-01-01 RX ADMIN — HYDROMORPHONE HYDROCHLORIDE 0.5 MILLIGRAM(S): 2 INJECTION INTRAMUSCULAR; INTRAVENOUS; SUBCUTANEOUS at 21:45

## 2022-01-01 RX ADMIN — Medication 3 MILLIGRAM(S): at 21:19

## 2022-01-01 RX ADMIN — FENTANYL CITRATE 1 PATCH: 50 INJECTION INTRAVENOUS at 19:50

## 2022-01-01 RX ADMIN — ERYTHROPOIETIN 10000 UNIT(S): 10000 INJECTION, SOLUTION INTRAVENOUS; SUBCUTANEOUS at 15:16

## 2022-01-01 RX ADMIN — Medication 650 MILLIGRAM(S): at 23:00

## 2022-01-01 RX ADMIN — HEPARIN SODIUM 5000 UNIT(S): 5000 INJECTION INTRAVENOUS; SUBCUTANEOUS at 15:00

## 2022-01-01 RX ADMIN — ISOSORBIDE MONONITRATE 30 MILLIGRAM(S): 60 TABLET, EXTENDED RELEASE ORAL at 14:06

## 2022-01-01 RX ADMIN — Medication 650 MILLIGRAM(S): at 12:32

## 2022-01-01 RX ADMIN — Medication 81 MILLIGRAM(S): at 11:44

## 2022-01-01 RX ADMIN — ISOSORBIDE MONONITRATE 30 MILLIGRAM(S): 60 TABLET, EXTENDED RELEASE ORAL at 11:57

## 2022-01-01 RX ADMIN — ERYTHROPOIETIN 10000 UNIT(S): 10000 INJECTION, SOLUTION INTRAVENOUS; SUBCUTANEOUS at 15:29

## 2022-01-01 RX ADMIN — Medication 1 SUPPOSITORY(S): at 21:25

## 2022-01-01 RX ADMIN — HEPARIN SODIUM 5000 UNIT(S): 5000 INJECTION INTRAVENOUS; SUBCUTANEOUS at 06:05

## 2022-01-01 RX ADMIN — CHLORHEXIDINE GLUCONATE 1 APPLICATION(S): 213 SOLUTION TOPICAL at 06:31

## 2022-01-01 RX ADMIN — TAMSULOSIN HYDROCHLORIDE 0.4 MILLIGRAM(S): 0.4 CAPSULE ORAL at 22:20

## 2022-01-01 RX ADMIN — Medication 90 MILLIGRAM(S): at 05:26

## 2022-01-01 RX ADMIN — Medication 650 MILLIGRAM(S): at 00:20

## 2022-01-01 RX ADMIN — Medication 1 SUPPOSITORY(S): at 05:11

## 2022-01-01 RX ADMIN — HEPARIN SODIUM 1200 UNIT(S)/HR: 5000 INJECTION INTRAVENOUS; SUBCUTANEOUS at 20:06

## 2022-01-01 RX ADMIN — PIPERACILLIN AND TAZOBACTAM 25 GRAM(S): 4; .5 INJECTION, POWDER, LYOPHILIZED, FOR SOLUTION INTRAVENOUS at 22:29

## 2022-01-01 RX ADMIN — Medication 650 MILLIGRAM(S): at 23:17

## 2022-01-01 RX ADMIN — Medication 1 TABLET(S): at 17:25

## 2022-01-01 RX ADMIN — HEPARIN SODIUM 5000 UNIT(S): 5000 INJECTION INTRAVENOUS; SUBCUTANEOUS at 04:43

## 2022-01-01 RX ADMIN — MEROPENEM 100 MILLIGRAM(S): 1 INJECTION INTRAVENOUS at 18:52

## 2022-01-01 RX ADMIN — Medication 650 MILLIGRAM(S): at 17:20

## 2022-01-01 RX ADMIN — CHLORHEXIDINE GLUCONATE 1 APPLICATION(S): 213 SOLUTION TOPICAL at 13:31

## 2022-01-01 RX ADMIN — Medication 650 MILLIGRAM(S): at 16:18

## 2022-01-01 RX ADMIN — Medication 3 MILLIGRAM(S): at 21:33

## 2022-01-01 RX ADMIN — ATORVASTATIN CALCIUM 80 MILLIGRAM(S): 80 TABLET, FILM COATED ORAL at 21:37

## 2022-01-01 RX ADMIN — Medication 650 MILLIGRAM(S): at 08:52

## 2022-01-01 RX ADMIN — FENTANYL CITRATE 25 MICROGRAM(S): 50 INJECTION INTRAVENOUS at 09:51

## 2022-01-01 RX ADMIN — HYDROMORPHONE HYDROCHLORIDE 0.5 MILLIGRAM(S): 2 INJECTION INTRAMUSCULAR; INTRAVENOUS; SUBCUTANEOUS at 22:50

## 2022-01-01 RX ADMIN — Medication 15 MILLIGRAM(S): at 21:10

## 2022-01-01 RX ADMIN — CHLORHEXIDINE GLUCONATE 1 APPLICATION(S): 213 SOLUTION TOPICAL at 08:03

## 2022-01-01 RX ADMIN — Medication 975 MILLIGRAM(S): at 17:46

## 2022-01-01 RX ADMIN — Medication 50 MILLIGRAM(S): at 22:37

## 2022-01-01 RX ADMIN — SEVELAMER CARBONATE 800 MILLIGRAM(S): 2400 POWDER, FOR SUSPENSION ORAL at 22:01

## 2022-01-01 RX ADMIN — QUETIAPINE FUMARATE 25 MILLIGRAM(S): 200 TABLET, FILM COATED ORAL at 22:37

## 2022-01-01 RX ADMIN — Medication 650 MILLIGRAM(S): at 17:24

## 2022-01-01 RX ADMIN — PANTOPRAZOLE SODIUM 40 MILLIGRAM(S): 20 TABLET, DELAYED RELEASE ORAL at 06:43

## 2022-01-01 RX ADMIN — HYDROMORPHONE HYDROCHLORIDE 0.5 MILLIGRAM(S): 2 INJECTION INTRAMUSCULAR; INTRAVENOUS; SUBCUTANEOUS at 04:55

## 2022-01-01 RX ADMIN — Medication 90 MILLIGRAM(S): at 05:22

## 2022-01-01 RX ADMIN — Medication 650 MILLIGRAM(S): at 23:12

## 2022-01-01 RX ADMIN — HYDROMORPHONE HYDROCHLORIDE 0.5 MILLIGRAM(S): 2 INJECTION INTRAMUSCULAR; INTRAVENOUS; SUBCUTANEOUS at 02:58

## 2022-01-01 RX ADMIN — Medication 650 MILLIGRAM(S): at 06:04

## 2022-01-01 RX ADMIN — Medication 650 MILLIGRAM(S): at 06:16

## 2022-01-01 RX ADMIN — CHLORHEXIDINE GLUCONATE 1 APPLICATION(S): 213 SOLUTION TOPICAL at 06:49

## 2022-01-01 RX ADMIN — HEPARIN SODIUM 5000 UNIT(S): 5000 INJECTION INTRAVENOUS; SUBCUTANEOUS at 22:26

## 2022-01-01 RX ADMIN — Medication 20 MILLIGRAM(S): at 06:45

## 2022-01-01 RX ADMIN — ISOSORBIDE MONONITRATE 30 MILLIGRAM(S): 60 TABLET, EXTENDED RELEASE ORAL at 14:37

## 2022-01-01 RX ADMIN — Medication 3 MILLIGRAM(S): at 21:16

## 2022-01-01 RX ADMIN — SEVELAMER CARBONATE 800 MILLIGRAM(S): 2400 POWDER, FOR SUSPENSION ORAL at 14:43

## 2022-01-01 RX ADMIN — TAMSULOSIN HYDROCHLORIDE 0.4 MILLIGRAM(S): 0.4 CAPSULE ORAL at 23:29

## 2022-01-01 RX ADMIN — SEVELAMER CARBONATE 800 MILLIGRAM(S): 2400 POWDER, FOR SUSPENSION ORAL at 17:45

## 2022-01-01 RX ADMIN — ISOSORBIDE MONONITRATE 30 MILLIGRAM(S): 60 TABLET, EXTENDED RELEASE ORAL at 08:46

## 2022-01-01 RX ADMIN — Medication 90 MILLIGRAM(S): at 05:24

## 2022-01-01 RX ADMIN — Medication 1 TABLET(S): at 12:48

## 2022-01-01 RX ADMIN — Medication 650 MILLIGRAM(S): at 05:57

## 2022-01-01 RX ADMIN — PANTOPRAZOLE SODIUM 40 MILLIGRAM(S): 20 TABLET, DELAYED RELEASE ORAL at 23:01

## 2022-01-01 RX ADMIN — Medication 650 MILLIGRAM(S): at 21:37

## 2022-01-01 RX ADMIN — ISOSORBIDE MONONITRATE 30 MILLIGRAM(S): 60 TABLET, EXTENDED RELEASE ORAL at 21:30

## 2022-01-01 RX ADMIN — Medication 81 MILLIGRAM(S): at 13:31

## 2022-01-01 RX ADMIN — DULOXETINE HYDROCHLORIDE 60 MILLIGRAM(S): 30 CAPSULE, DELAYED RELEASE ORAL at 14:36

## 2022-01-01 RX ADMIN — ATORVASTATIN CALCIUM 80 MILLIGRAM(S): 80 TABLET, FILM COATED ORAL at 22:08

## 2022-01-01 RX ADMIN — HEPARIN SODIUM 5000 UNIT(S): 5000 INJECTION INTRAVENOUS; SUBCUTANEOUS at 06:11

## 2022-01-01 RX ADMIN — Medication 100 MILLIGRAM(S): at 21:03

## 2022-01-01 RX ADMIN — HEPARIN SODIUM 1200 UNIT(S)/HR: 5000 INJECTION INTRAVENOUS; SUBCUTANEOUS at 07:25

## 2022-01-01 RX ADMIN — Medication 650 MILLIGRAM(S): at 00:25

## 2022-01-01 RX ADMIN — Medication 650 MILLIGRAM(S): at 17:13

## 2022-01-01 RX ADMIN — ISOSORBIDE MONONITRATE 30 MILLIGRAM(S): 60 TABLET, EXTENDED RELEASE ORAL at 13:58

## 2022-01-01 RX ADMIN — Medication 1 TABLET(S): at 23:07

## 2022-01-01 RX ADMIN — Medication 1 APPLICATION(S): at 05:21

## 2022-01-01 RX ADMIN — Medication 650 MILLIGRAM(S): at 06:02

## 2022-01-01 RX ADMIN — FENTANYL CITRATE 1 PATCH: 50 INJECTION INTRAVENOUS at 18:47

## 2022-01-01 RX ADMIN — SEVELAMER CARBONATE 800 MILLIGRAM(S): 2400 POWDER, FOR SUSPENSION ORAL at 22:33

## 2022-01-01 RX ADMIN — SEVELAMER CARBONATE 800 MILLIGRAM(S): 2400 POWDER, FOR SUSPENSION ORAL at 09:14

## 2022-01-01 RX ADMIN — Medication 50 MILLIGRAM(S): at 21:42

## 2022-01-01 RX ADMIN — HEPARIN SODIUM 1200 UNIT(S)/HR: 5000 INJECTION INTRAVENOUS; SUBCUTANEOUS at 08:47

## 2022-01-01 RX ADMIN — HEPARIN SODIUM 5000 UNIT(S): 5000 INJECTION INTRAVENOUS; SUBCUTANEOUS at 17:45

## 2022-01-01 RX ADMIN — ISOSORBIDE MONONITRATE 30 MILLIGRAM(S): 60 TABLET, EXTENDED RELEASE ORAL at 18:25

## 2022-01-01 RX ADMIN — SEVELAMER CARBONATE 800 MILLIGRAM(S): 2400 POWDER, FOR SUSPENSION ORAL at 17:38

## 2022-01-01 RX ADMIN — Medication 975 MILLIGRAM(S): at 22:40

## 2022-01-01 RX ADMIN — Medication 400 MILLIGRAM(S): at 11:41

## 2022-01-01 RX ADMIN — DULOXETINE HYDROCHLORIDE 60 MILLIGRAM(S): 30 CAPSULE, DELAYED RELEASE ORAL at 17:11

## 2022-01-01 RX ADMIN — HEPARIN SODIUM 5000 UNIT(S): 5000 INJECTION INTRAVENOUS; SUBCUTANEOUS at 05:37

## 2022-01-01 RX ADMIN — Medication 975 MILLIGRAM(S): at 05:23

## 2022-01-01 RX ADMIN — Medication 1 TABLET(S): at 12:17

## 2022-01-01 RX ADMIN — HEPARIN SODIUM 1200 UNIT(S)/HR: 5000 INJECTION INTRAVENOUS; SUBCUTANEOUS at 09:55

## 2022-01-01 RX ADMIN — MIDODRINE HYDROCHLORIDE 10 MILLIGRAM(S): 2.5 TABLET ORAL at 05:13

## 2022-01-01 RX ADMIN — Medication 400 MILLIGRAM(S): at 23:06

## 2022-01-01 RX ADMIN — QUETIAPINE FUMARATE 25 MILLIGRAM(S): 200 TABLET, FILM COATED ORAL at 22:26

## 2022-01-01 RX ADMIN — PANTOPRAZOLE SODIUM 40 MILLIGRAM(S): 20 TABLET, DELAYED RELEASE ORAL at 07:42

## 2022-01-01 RX ADMIN — Medication 1 TABLET(S): at 11:42

## 2022-01-01 RX ADMIN — Medication 1 TABLET(S): at 10:14

## 2022-01-01 RX ADMIN — TAMSULOSIN HYDROCHLORIDE 0.4 MILLIGRAM(S): 0.4 CAPSULE ORAL at 21:40

## 2022-01-01 RX ADMIN — Medication 81 MILLIGRAM(S): at 12:38

## 2022-01-01 RX ADMIN — SEVELAMER CARBONATE 800 MILLIGRAM(S): 2400 POWDER, FOR SUSPENSION ORAL at 13:49

## 2022-01-01 RX ADMIN — Medication 1 TABLET(S): at 14:52

## 2022-01-01 RX ADMIN — LIDOCAINE 1 PATCH: 4 CREAM TOPICAL at 20:15

## 2022-01-01 RX ADMIN — Medication 650 MILLIGRAM(S): at 18:57

## 2022-01-01 RX ADMIN — Medication 60 MILLIGRAM(S): at 00:47

## 2022-01-01 RX ADMIN — FENTANYL CITRATE 1 PATCH: 50 INJECTION INTRAVENOUS at 20:43

## 2022-01-01 RX ADMIN — Medication 90 MILLIGRAM(S): at 06:43

## 2022-01-01 RX ADMIN — HEPARIN SODIUM 5000 UNIT(S): 5000 INJECTION INTRAVENOUS; SUBCUTANEOUS at 22:14

## 2022-01-01 RX ADMIN — ATORVASTATIN CALCIUM 80 MILLIGRAM(S): 80 TABLET, FILM COATED ORAL at 21:19

## 2022-01-01 RX ADMIN — ENOXAPARIN SODIUM 68 MILLIGRAM(S): 100 INJECTION SUBCUTANEOUS at 14:12

## 2022-01-01 RX ADMIN — Medication 3 MILLIGRAM(S): at 21:03

## 2022-01-01 RX ADMIN — TAMSULOSIN HYDROCHLORIDE 0.4 MILLIGRAM(S): 0.4 CAPSULE ORAL at 21:45

## 2022-01-01 RX ADMIN — POLYETHYLENE GLYCOL 3350 17 GRAM(S): 17 POWDER, FOR SOLUTION ORAL at 05:22

## 2022-01-01 RX ADMIN — Medication 3 MILLIGRAM(S): at 21:32

## 2022-01-01 RX ADMIN — Medication 90 MILLIGRAM(S): at 05:49

## 2022-01-01 RX ADMIN — Medication 500000 UNIT(S): at 16:39

## 2022-01-01 RX ADMIN — CHLORHEXIDINE GLUCONATE 1 APPLICATION(S): 213 SOLUTION TOPICAL at 06:22

## 2022-01-01 RX ADMIN — HEPARIN SODIUM 5000 UNIT(S): 5000 INJECTION INTRAVENOUS; SUBCUTANEOUS at 05:12

## 2022-01-01 RX ADMIN — Medication 50 MILLIGRAM(S): at 22:21

## 2022-01-01 RX ADMIN — LIDOCAINE 1 PATCH: 4 CREAM TOPICAL at 19:04

## 2022-01-01 RX ADMIN — SEVELAMER CARBONATE 800 MILLIGRAM(S): 2400 POWDER, FOR SUSPENSION ORAL at 09:29

## 2022-01-01 RX ADMIN — Medication 650 MILLIGRAM(S): at 05:43

## 2022-01-01 RX ADMIN — SEVELAMER CARBONATE 800 MILLIGRAM(S): 2400 POWDER, FOR SUSPENSION ORAL at 17:49

## 2022-01-01 RX ADMIN — Medication 81 MILLIGRAM(S): at 16:54

## 2022-01-01 RX ADMIN — DIPHENHYDRAMINE HYDROCHLORIDE AND LIDOCAINE HYDROCHLORIDE AND ALUMINUM HYDROXIDE AND MAGNESIUM HYDRO 15 MILLILITER(S): KIT at 14:43

## 2022-01-01 RX ADMIN — SEVELAMER CARBONATE 800 MILLIGRAM(S): 2400 POWDER, FOR SUSPENSION ORAL at 12:09

## 2022-01-01 RX ADMIN — SEVELAMER CARBONATE 800 MILLIGRAM(S): 2400 POWDER, FOR SUSPENSION ORAL at 13:20

## 2022-01-01 RX ADMIN — PIPERACILLIN AND TAZOBACTAM 25 GRAM(S): 4; .5 INJECTION, POWDER, LYOPHILIZED, FOR SOLUTION INTRAVENOUS at 02:19

## 2022-01-01 RX ADMIN — Medication 650 MILLIGRAM(S): at 14:37

## 2022-01-01 RX ADMIN — ERYTHROPOIETIN 10000 UNIT(S): 10000 INJECTION, SOLUTION INTRAVENOUS; SUBCUTANEOUS at 18:27

## 2022-01-01 RX ADMIN — Medication 20 MILLIGRAM(S): at 05:40

## 2022-01-01 RX ADMIN — HYDROMORPHONE HYDROCHLORIDE 0.5 MILLIGRAM(S): 2 INJECTION INTRAMUSCULAR; INTRAVENOUS; SUBCUTANEOUS at 13:35

## 2022-01-01 RX ADMIN — HEPARIN SODIUM 5000 UNIT(S): 5000 INJECTION INTRAVENOUS; SUBCUTANEOUS at 17:15

## 2022-01-01 RX ADMIN — ATORVASTATIN CALCIUM 80 MILLIGRAM(S): 80 TABLET, FILM COATED ORAL at 22:39

## 2022-01-01 RX ADMIN — Medication 81 MILLIGRAM(S): at 11:07

## 2022-01-01 RX ADMIN — Medication 3 MILLIGRAM(S): at 21:30

## 2022-01-01 RX ADMIN — Medication 3 MILLIGRAM(S): at 23:18

## 2022-01-01 RX ADMIN — TAMSULOSIN HYDROCHLORIDE 0.4 MILLIGRAM(S): 0.4 CAPSULE ORAL at 22:33

## 2022-01-01 RX ADMIN — MORPHINE SULFATE 2 MILLIGRAM(S): 50 CAPSULE, EXTENDED RELEASE ORAL at 19:27

## 2022-01-01 RX ADMIN — CHLORHEXIDINE GLUCONATE 1 APPLICATION(S): 213 SOLUTION TOPICAL at 16:17

## 2022-01-01 RX ADMIN — SEVELAMER CARBONATE 800 MILLIGRAM(S): 2400 POWDER, FOR SUSPENSION ORAL at 05:29

## 2022-01-01 RX ADMIN — QUETIAPINE FUMARATE 25 MILLIGRAM(S): 200 TABLET, FILM COATED ORAL at 23:33

## 2022-01-01 RX ADMIN — HEPARIN SODIUM 5000 UNIT(S): 5000 INJECTION INTRAVENOUS; SUBCUTANEOUS at 17:56

## 2022-01-01 RX ADMIN — Medication 60 MILLIGRAM(S): at 22:10

## 2022-01-01 RX ADMIN — Medication 650 MILLIGRAM(S): at 08:03

## 2022-01-01 RX ADMIN — Medication 90 MILLIGRAM(S): at 06:19

## 2022-01-01 RX ADMIN — Medication 1 TABLET(S): at 12:51

## 2022-01-01 RX ADMIN — HEPARIN SODIUM 5000 UNIT(S): 5000 INJECTION INTRAVENOUS; SUBCUTANEOUS at 18:32

## 2022-01-01 RX ADMIN — HEPARIN SODIUM 5000 UNIT(S): 5000 INJECTION INTRAVENOUS; SUBCUTANEOUS at 13:21

## 2022-01-01 RX ADMIN — SEVELAMER CARBONATE 800 MILLIGRAM(S): 2400 POWDER, FOR SUSPENSION ORAL at 13:46

## 2022-01-01 RX ADMIN — SEVELAMER CARBONATE 800 MILLIGRAM(S): 2400 POWDER, FOR SUSPENSION ORAL at 06:04

## 2022-01-01 RX ADMIN — Medication 975 MILLIGRAM(S): at 20:19

## 2022-01-01 RX ADMIN — Medication 100 MILLIGRAM(S): at 22:30

## 2022-01-01 RX ADMIN — FENTANYL CITRATE 1 PATCH: 50 INJECTION INTRAVENOUS at 07:30

## 2022-01-01 RX ADMIN — HEPARIN SODIUM 1200 UNIT(S)/HR: 5000 INJECTION INTRAVENOUS; SUBCUTANEOUS at 13:11

## 2022-01-01 RX ADMIN — Medication 650 MILLIGRAM(S): at 01:00

## 2022-01-01 RX ADMIN — DULOXETINE HYDROCHLORIDE 60 MILLIGRAM(S): 30 CAPSULE, DELAYED RELEASE ORAL at 18:22

## 2022-01-01 RX ADMIN — CHLORHEXIDINE GLUCONATE 1 APPLICATION(S): 213 SOLUTION TOPICAL at 05:00

## 2022-01-01 RX ADMIN — FENTANYL CITRATE 1 PATCH: 50 INJECTION INTRAVENOUS at 07:50

## 2022-01-01 RX ADMIN — FENTANYL CITRATE 50 MICROGRAM(S): 50 INJECTION INTRAVENOUS at 19:30

## 2022-01-01 RX ADMIN — SEVELAMER CARBONATE 800 MILLIGRAM(S): 2400 POWDER, FOR SUSPENSION ORAL at 04:43

## 2022-01-01 RX ADMIN — LIDOCAINE 1 PATCH: 4 CREAM TOPICAL at 19:00

## 2022-01-01 RX ADMIN — ISOSORBIDE MONONITRATE 30 MILLIGRAM(S): 60 TABLET, EXTENDED RELEASE ORAL at 16:39

## 2022-01-01 RX ADMIN — SEVELAMER CARBONATE 800 MILLIGRAM(S): 2400 POWDER, FOR SUSPENSION ORAL at 14:36

## 2022-01-01 RX ADMIN — ATORVASTATIN CALCIUM 80 MILLIGRAM(S): 80 TABLET, FILM COATED ORAL at 21:22

## 2022-01-01 RX ADMIN — HEPARIN SODIUM 5000 UNIT(S): 5000 INJECTION INTRAVENOUS; SUBCUTANEOUS at 22:40

## 2022-01-01 RX ADMIN — OXYCODONE HYDROCHLORIDE 5 MILLIGRAM(S): 5 TABLET ORAL at 18:40

## 2022-01-01 RX ADMIN — Medication 15 GRAM(S): at 07:11

## 2022-01-01 RX ADMIN — PANTOPRAZOLE SODIUM 40 MILLIGRAM(S): 20 TABLET, DELAYED RELEASE ORAL at 05:55

## 2022-01-01 RX ADMIN — FENTANYL CITRATE 1 PATCH: 50 INJECTION INTRAVENOUS at 19:37

## 2022-01-01 RX ADMIN — Medication 20 MILLIEQUIVALENT(S): at 10:29

## 2022-01-01 RX ADMIN — SEVELAMER CARBONATE 800 MILLIGRAM(S): 2400 POWDER, FOR SUSPENSION ORAL at 06:19

## 2022-01-01 RX ADMIN — Medication 650 MILLIGRAM(S): at 23:10

## 2022-01-01 RX ADMIN — Medication 100 MILLIGRAM(S): at 21:37

## 2022-01-01 RX ADMIN — ISOSORBIDE MONONITRATE 30 MILLIGRAM(S): 60 TABLET, EXTENDED RELEASE ORAL at 14:43

## 2022-01-01 RX ADMIN — ATORVASTATIN CALCIUM 80 MILLIGRAM(S): 80 TABLET, FILM COATED ORAL at 21:40

## 2022-01-01 RX ADMIN — PANTOPRAZOLE SODIUM 40 MILLIGRAM(S): 20 TABLET, DELAYED RELEASE ORAL at 18:04

## 2022-01-01 RX ADMIN — Medication 20 MILLIGRAM(S): at 06:53

## 2022-01-01 RX ADMIN — Medication 1 TABLET(S): at 14:37

## 2022-01-01 RX ADMIN — HYDROMORPHONE HYDROCHLORIDE 0.5 MILLIGRAM(S): 2 INJECTION INTRAMUSCULAR; INTRAVENOUS; SUBCUTANEOUS at 18:06

## 2022-01-01 RX ADMIN — PANTOPRAZOLE SODIUM 40 MILLIGRAM(S): 20 TABLET, DELAYED RELEASE ORAL at 05:25

## 2022-01-01 RX ADMIN — Medication 20 MILLIGRAM(S): at 05:39

## 2022-01-01 RX ADMIN — Medication 975 MILLIGRAM(S): at 15:43

## 2022-01-01 RX ADMIN — Medication 400 MILLIGRAM(S): at 16:18

## 2022-01-01 RX ADMIN — POLYETHYLENE GLYCOL 3350 17 GRAM(S): 17 POWDER, FOR SOLUTION ORAL at 17:58

## 2022-01-01 RX ADMIN — Medication 81 MILLIGRAM(S): at 08:55

## 2022-01-01 RX ADMIN — HEPARIN SODIUM 1400 UNIT(S)/HR: 5000 INJECTION INTRAVENOUS; SUBCUTANEOUS at 07:53

## 2022-01-01 RX ADMIN — ISOSORBIDE MONONITRATE 30 MILLIGRAM(S): 60 TABLET, EXTENDED RELEASE ORAL at 12:53

## 2022-01-01 RX ADMIN — Medication 650 MILLIGRAM(S): at 13:50

## 2022-01-01 RX ADMIN — SENNA PLUS 2 TABLET(S): 8.6 TABLET ORAL at 22:07

## 2022-01-01 RX ADMIN — Medication 100 MILLIGRAM(S): at 17:42

## 2022-01-01 RX ADMIN — ISOSORBIDE MONONITRATE 30 MILLIGRAM(S): 60 TABLET, EXTENDED RELEASE ORAL at 12:06

## 2022-01-01 RX ADMIN — HEPARIN SODIUM 5000 UNIT(S): 5000 INJECTION INTRAVENOUS; SUBCUTANEOUS at 06:37

## 2022-01-01 RX ADMIN — Medication 90 MILLIGRAM(S): at 05:10

## 2022-01-01 RX ADMIN — Medication 400 MILLIGRAM(S): at 22:57

## 2022-01-01 RX ADMIN — DULOXETINE HYDROCHLORIDE 60 MILLIGRAM(S): 30 CAPSULE, DELAYED RELEASE ORAL at 10:21

## 2022-01-01 RX ADMIN — SEVELAMER CARBONATE 800 MILLIGRAM(S): 2400 POWDER, FOR SUSPENSION ORAL at 10:37

## 2022-01-01 RX ADMIN — Medication 50 MILLIGRAM(S): at 21:32

## 2022-01-01 RX ADMIN — Medication 650 MILLIGRAM(S): at 06:18

## 2022-01-01 RX ADMIN — Medication 1 APPLICATION(S): at 07:00

## 2022-01-01 RX ADMIN — FENTANYL CITRATE 1 PATCH: 50 INJECTION INTRAVENOUS at 07:28

## 2022-01-01 RX ADMIN — SEVELAMER CARBONATE 800 MILLIGRAM(S): 2400 POWDER, FOR SUSPENSION ORAL at 22:15

## 2022-01-01 RX ADMIN — ISOSORBIDE MONONITRATE 30 MILLIGRAM(S): 60 TABLET, EXTENDED RELEASE ORAL at 08:55

## 2022-01-01 RX ADMIN — HEPARIN SODIUM 1200 UNIT(S)/HR: 5000 INJECTION INTRAVENOUS; SUBCUTANEOUS at 21:16

## 2022-01-01 RX ADMIN — Medication 650 MILLIGRAM(S): at 13:10

## 2022-01-01 RX ADMIN — Medication 650 MILLIGRAM(S): at 06:20

## 2022-01-01 RX ADMIN — Medication 20 MILLIGRAM(S): at 05:57

## 2022-01-01 RX ADMIN — HEPARIN SODIUM 5000 UNIT(S): 5000 INJECTION INTRAVENOUS; SUBCUTANEOUS at 05:06

## 2022-01-01 RX ADMIN — SEVELAMER CARBONATE 800 MILLIGRAM(S): 2400 POWDER, FOR SUSPENSION ORAL at 21:39

## 2022-01-01 RX ADMIN — ENOXAPARIN SODIUM 68 MILLIGRAM(S): 100 INJECTION SUBCUTANEOUS at 13:15

## 2022-01-01 RX ADMIN — Medication 5 MILLIGRAM(S): at 22:59

## 2022-01-01 RX ADMIN — CHLORHEXIDINE GLUCONATE 1 APPLICATION(S): 213 SOLUTION TOPICAL at 11:31

## 2022-01-01 RX ADMIN — Medication 650 MILLIGRAM(S): at 15:24

## 2022-01-01 RX ADMIN — Medication 1 TABLET(S): at 11:55

## 2022-01-01 RX ADMIN — SEVELAMER CARBONATE 800 MILLIGRAM(S): 2400 POWDER, FOR SUSPENSION ORAL at 12:53

## 2022-01-01 RX ADMIN — LIDOCAINE 1 PATCH: 4 CREAM TOPICAL at 18:19

## 2022-01-01 RX ADMIN — Medication 650 MILLIGRAM(S): at 21:40

## 2022-01-01 RX ADMIN — Medication 60 MILLIGRAM(S): at 05:51

## 2022-01-01 RX ADMIN — Medication 1 TABLET(S): at 18:19

## 2022-01-01 RX ADMIN — HEPARIN SODIUM 5000 UNIT(S): 5000 INJECTION INTRAVENOUS; SUBCUTANEOUS at 21:32

## 2022-01-01 RX ADMIN — ATORVASTATIN CALCIUM 80 MILLIGRAM(S): 80 TABLET, FILM COATED ORAL at 22:07

## 2022-01-01 RX ADMIN — Medication 100 MILLIGRAM(S): at 23:28

## 2022-01-01 RX ADMIN — Medication 650 MILLIGRAM(S): at 13:32

## 2022-01-01 RX ADMIN — LIDOCAINE 1 PATCH: 4 CREAM TOPICAL at 20:38

## 2022-01-01 RX ADMIN — SEVELAMER CARBONATE 800 MILLIGRAM(S): 2400 POWDER, FOR SUSPENSION ORAL at 04:48

## 2022-01-01 RX ADMIN — ATORVASTATIN CALCIUM 80 MILLIGRAM(S): 80 TABLET, FILM COATED ORAL at 21:36

## 2022-01-01 RX ADMIN — PIPERACILLIN AND TAZOBACTAM 25 GRAM(S): 4; .5 INJECTION, POWDER, LYOPHILIZED, FOR SOLUTION INTRAVENOUS at 05:13

## 2022-01-01 RX ADMIN — Medication 1 APPLICATION(S): at 17:48

## 2022-01-01 RX ADMIN — Medication 1 TABLET(S): at 12:06

## 2022-01-01 RX ADMIN — HEPARIN SODIUM 5000 UNIT(S): 5000 INJECTION INTRAVENOUS; SUBCUTANEOUS at 21:03

## 2022-01-01 RX ADMIN — HEPARIN SODIUM 5000 UNIT(S): 5000 INJECTION INTRAVENOUS; SUBCUTANEOUS at 17:46

## 2022-01-01 RX ADMIN — HYDROMORPHONE HYDROCHLORIDE 0.5 MILLIGRAM(S): 2 INJECTION INTRAMUSCULAR; INTRAVENOUS; SUBCUTANEOUS at 15:09

## 2022-01-01 RX ADMIN — Medication 1 TABLET(S): at 21:44

## 2022-01-01 RX ADMIN — Medication 90 MILLIGRAM(S): at 05:18

## 2022-01-01 RX ADMIN — Medication 20 MILLIEQUIVALENT(S): at 11:07

## 2022-01-01 RX ADMIN — Medication 1 TABLET(S): at 12:23

## 2022-01-01 RX ADMIN — Medication 975 MILLIGRAM(S): at 23:34

## 2022-01-01 RX ADMIN — Medication 1 TABLET(S): at 21:38

## 2022-01-01 RX ADMIN — ALTEPLASE 2 MILLIGRAM(S): KIT at 13:36

## 2022-01-01 RX ADMIN — PANTOPRAZOLE SODIUM 40 MILLIGRAM(S): 20 TABLET, DELAYED RELEASE ORAL at 06:04

## 2022-01-01 RX ADMIN — ATORVASTATIN CALCIUM 80 MILLIGRAM(S): 80 TABLET, FILM COATED ORAL at 21:54

## 2022-01-01 RX ADMIN — Medication 650 MILLIGRAM(S): at 17:18

## 2022-01-01 RX ADMIN — Medication 650 MILLIGRAM(S): at 13:00

## 2022-01-01 RX ADMIN — PIPERACILLIN AND TAZOBACTAM 25 GRAM(S): 4; .5 INJECTION, POWDER, LYOPHILIZED, FOR SOLUTION INTRAVENOUS at 10:13

## 2022-01-01 RX ADMIN — PANTOPRAZOLE SODIUM 40 MILLIGRAM(S): 20 TABLET, DELAYED RELEASE ORAL at 05:54

## 2022-01-01 RX ADMIN — Medication 81 MILLIGRAM(S): at 12:32

## 2022-01-01 RX ADMIN — Medication 650 MILLIGRAM(S): at 23:08

## 2022-01-01 RX ADMIN — QUETIAPINE FUMARATE 25 MILLIGRAM(S): 200 TABLET, FILM COATED ORAL at 21:23

## 2022-01-01 RX ADMIN — DULOXETINE HYDROCHLORIDE 60 MILLIGRAM(S): 30 CAPSULE, DELAYED RELEASE ORAL at 07:57

## 2022-01-01 RX ADMIN — ISOSORBIDE MONONITRATE 30 MILLIGRAM(S): 60 TABLET, EXTENDED RELEASE ORAL at 10:28

## 2022-01-01 RX ADMIN — Medication 100 MILLIGRAM(S): at 22:14

## 2022-01-01 RX ADMIN — Medication 650 MILLIGRAM(S): at 13:14

## 2022-01-01 RX ADMIN — HEPARIN SODIUM 1200 UNIT(S)/HR: 5000 INJECTION INTRAVENOUS; SUBCUTANEOUS at 03:10

## 2022-01-01 RX ADMIN — HYDROMORPHONE HYDROCHLORIDE 2 MILLIGRAM(S): 2 INJECTION INTRAMUSCULAR; INTRAVENOUS; SUBCUTANEOUS at 02:43

## 2022-01-01 RX ADMIN — FENTANYL CITRATE 1 PATCH: 50 INJECTION INTRAVENOUS at 07:51

## 2022-01-01 RX ADMIN — ISOSORBIDE MONONITRATE 30 MILLIGRAM(S): 60 TABLET, EXTENDED RELEASE ORAL at 14:35

## 2022-01-01 RX ADMIN — HYDROMORPHONE HYDROCHLORIDE 2 MILLIGRAM(S): 2 INJECTION INTRAMUSCULAR; INTRAVENOUS; SUBCUTANEOUS at 23:30

## 2022-01-01 RX ADMIN — Medication 1 APPLICATION(S): at 18:23

## 2022-01-01 RX ADMIN — Medication 1000 MILLIGRAM(S): at 21:35

## 2022-01-01 RX ADMIN — Medication 20 MILLIGRAM(S): at 06:20

## 2022-01-01 RX ADMIN — PANTOPRAZOLE SODIUM 40 MILLIGRAM(S): 20 TABLET, DELAYED RELEASE ORAL at 17:58

## 2022-01-01 RX ADMIN — Medication 975 MILLIGRAM(S): at 06:34

## 2022-01-01 RX ADMIN — Medication 1 TABLET(S): at 22:01

## 2022-01-01 RX ADMIN — SEVELAMER CARBONATE 800 MILLIGRAM(S): 2400 POWDER, FOR SUSPENSION ORAL at 22:24

## 2022-01-01 RX ADMIN — SEVELAMER CARBONATE 800 MILLIGRAM(S): 2400 POWDER, FOR SUSPENSION ORAL at 14:22

## 2022-01-01 RX ADMIN — PANTOPRAZOLE SODIUM 10 MG/HR: 20 TABLET, DELAYED RELEASE ORAL at 12:19

## 2022-01-01 RX ADMIN — ATORVASTATIN CALCIUM 80 MILLIGRAM(S): 80 TABLET, FILM COATED ORAL at 22:21

## 2022-01-01 RX ADMIN — Medication 50 MILLIGRAM(S): at 22:03

## 2022-01-01 RX ADMIN — HYDROMORPHONE HYDROCHLORIDE 0.5 MILLIGRAM(S): 2 INJECTION INTRAMUSCULAR; INTRAVENOUS; SUBCUTANEOUS at 09:06

## 2022-01-01 RX ADMIN — Medication 20 MILLIGRAM(S): at 04:48

## 2022-01-01 RX ADMIN — HEPARIN SODIUM 5000 UNIT(S): 5000 INJECTION INTRAVENOUS; SUBCUTANEOUS at 17:07

## 2022-01-01 RX ADMIN — TAMSULOSIN HYDROCHLORIDE 0.4 MILLIGRAM(S): 0.4 CAPSULE ORAL at 23:47

## 2022-01-01 RX ADMIN — HEPARIN SODIUM 5000 UNIT(S): 5000 INJECTION INTRAVENOUS; SUBCUTANEOUS at 18:15

## 2022-01-01 RX ADMIN — DULOXETINE HYDROCHLORIDE 60 MILLIGRAM(S): 30 CAPSULE, DELAYED RELEASE ORAL at 13:45

## 2022-01-01 RX ADMIN — Medication 400 MILLIGRAM(S): at 00:18

## 2022-01-01 RX ADMIN — ATORVASTATIN CALCIUM 80 MILLIGRAM(S): 80 TABLET, FILM COATED ORAL at 21:17

## 2022-01-01 RX ADMIN — HEPARIN SODIUM 5000 UNIT(S): 5000 INJECTION INTRAVENOUS; SUBCUTANEOUS at 06:30

## 2022-01-01 RX ADMIN — SEVELAMER CARBONATE 800 MILLIGRAM(S): 2400 POWDER, FOR SUSPENSION ORAL at 08:39

## 2022-01-01 RX ADMIN — DULOXETINE HYDROCHLORIDE 60 MILLIGRAM(S): 30 CAPSULE, DELAYED RELEASE ORAL at 17:44

## 2022-01-01 RX ADMIN — Medication 400 MILLIGRAM(S): at 22:55

## 2022-01-01 RX ADMIN — POLYETHYLENE GLYCOL 3350 17 GRAM(S): 17 POWDER, FOR SOLUTION ORAL at 06:19

## 2022-01-01 RX ADMIN — Medication 650 MILLIGRAM(S): at 16:20

## 2022-01-01 RX ADMIN — Medication 100 MILLIGRAM(S): at 17:48

## 2022-01-01 RX ADMIN — MEROPENEM 100 MILLIGRAM(S): 1 INJECTION INTRAVENOUS at 17:13

## 2022-01-01 RX ADMIN — Medication 100 GRAM(S): at 08:27

## 2022-01-01 RX ADMIN — QUETIAPINE FUMARATE 25 MILLIGRAM(S): 200 TABLET, FILM COATED ORAL at 23:48

## 2022-01-01 RX ADMIN — Medication 81 MILLIGRAM(S): at 12:55

## 2022-01-01 RX ADMIN — TAMSULOSIN HYDROCHLORIDE 0.4 MILLIGRAM(S): 0.4 CAPSULE ORAL at 23:01

## 2022-01-01 RX ADMIN — ENOXAPARIN SODIUM 68 MILLIGRAM(S): 100 INJECTION SUBCUTANEOUS at 14:29

## 2022-01-01 RX ADMIN — PANTOPRAZOLE SODIUM 40 MILLIGRAM(S): 20 TABLET, DELAYED RELEASE ORAL at 05:56

## 2022-01-01 RX ADMIN — DULOXETINE HYDROCHLORIDE 60 MILLIGRAM(S): 30 CAPSULE, DELAYED RELEASE ORAL at 09:24

## 2022-01-01 RX ADMIN — Medication 81 MILLIGRAM(S): at 11:55

## 2022-01-01 RX ADMIN — SEVELAMER CARBONATE 800 MILLIGRAM(S): 2400 POWDER, FOR SUSPENSION ORAL at 21:42

## 2022-01-01 RX ADMIN — Medication 650 MILLIGRAM(S): at 18:21

## 2022-01-01 RX ADMIN — ATORVASTATIN CALCIUM 80 MILLIGRAM(S): 80 TABLET, FILM COATED ORAL at 21:52

## 2022-01-01 RX ADMIN — ERYTHROPOIETIN 10000 UNIT(S): 10000 INJECTION, SOLUTION INTRAVENOUS; SUBCUTANEOUS at 12:09

## 2022-01-01 RX ADMIN — HYDROMORPHONE HYDROCHLORIDE 2 MILLIGRAM(S): 2 INJECTION INTRAMUSCULAR; INTRAVENOUS; SUBCUTANEOUS at 12:03

## 2022-01-01 RX ADMIN — HEPARIN SODIUM 5000 UNIT(S): 5000 INJECTION INTRAVENOUS; SUBCUTANEOUS at 17:10

## 2022-01-01 RX ADMIN — Medication 81 MILLIGRAM(S): at 13:57

## 2022-01-01 RX ADMIN — POLYETHYLENE GLYCOL 3350 17 GRAM(S): 17 POWDER, FOR SOLUTION ORAL at 05:58

## 2022-01-01 RX ADMIN — TAMSULOSIN HYDROCHLORIDE 0.4 MILLIGRAM(S): 0.4 CAPSULE ORAL at 22:23

## 2022-01-01 RX ADMIN — Medication 650 MILLIGRAM(S): at 06:31

## 2022-01-01 RX ADMIN — ATORVASTATIN CALCIUM 80 MILLIGRAM(S): 80 TABLET, FILM COATED ORAL at 23:33

## 2022-01-01 RX ADMIN — HYDROMORPHONE HYDROCHLORIDE 0.5 MILLIGRAM(S): 2 INJECTION INTRAMUSCULAR; INTRAVENOUS; SUBCUTANEOUS at 00:40

## 2022-01-01 RX ADMIN — Medication 650 MILLIGRAM(S): at 12:30

## 2022-01-01 RX ADMIN — CHLORHEXIDINE GLUCONATE 1 APPLICATION(S): 213 SOLUTION TOPICAL at 18:38

## 2022-01-01 RX ADMIN — MORPHINE SULFATE 2 MILLIGRAM(S): 50 CAPSULE, EXTENDED RELEASE ORAL at 19:57

## 2022-01-01 RX ADMIN — FENTANYL CITRATE 1 PATCH: 50 INJECTION INTRAVENOUS at 19:53

## 2022-01-01 RX ADMIN — HYDROMORPHONE HYDROCHLORIDE 0.5 MILLIGRAM(S): 2 INJECTION INTRAMUSCULAR; INTRAVENOUS; SUBCUTANEOUS at 23:36

## 2022-01-01 RX ADMIN — SEVELAMER CARBONATE 800 MILLIGRAM(S): 2400 POWDER, FOR SUSPENSION ORAL at 18:30

## 2022-01-01 RX ADMIN — Medication 15 MILLIGRAM(S): at 14:44

## 2022-01-01 RX ADMIN — Medication 100 MILLIGRAM(S): at 23:00

## 2022-01-01 RX ADMIN — PANTOPRAZOLE SODIUM 40 MILLIGRAM(S): 20 TABLET, DELAYED RELEASE ORAL at 18:19

## 2022-01-01 RX ADMIN — Medication 25 GRAM(S): at 11:27

## 2022-01-01 RX ADMIN — ATORVASTATIN CALCIUM 80 MILLIGRAM(S): 80 TABLET, FILM COATED ORAL at 22:58

## 2022-01-01 RX ADMIN — TAMSULOSIN HYDROCHLORIDE 0.4 MILLIGRAM(S): 0.4 CAPSULE ORAL at 21:43

## 2022-01-01 RX ADMIN — Medication 1 SUPPOSITORY(S): at 17:29

## 2022-01-01 RX ADMIN — POLYETHYLENE GLYCOL 3350 17 GRAM(S): 17 POWDER, FOR SOLUTION ORAL at 18:19

## 2022-01-01 RX ADMIN — Medication 5 MILLIGRAM(S): at 21:12

## 2022-01-01 RX ADMIN — ATORVASTATIN CALCIUM 80 MILLIGRAM(S): 80 TABLET, FILM COATED ORAL at 21:42

## 2022-01-01 RX ADMIN — Medication 650 MILLIGRAM(S): at 23:58

## 2022-01-01 RX ADMIN — Medication 50 MILLIGRAM(S): at 21:39

## 2022-01-01 RX ADMIN — TAMSULOSIN HYDROCHLORIDE 0.4 MILLIGRAM(S): 0.4 CAPSULE ORAL at 21:12

## 2022-01-01 RX ADMIN — Medication 90 MILLIGRAM(S): at 06:08

## 2022-01-01 RX ADMIN — SEVELAMER CARBONATE 800 MILLIGRAM(S): 2400 POWDER, FOR SUSPENSION ORAL at 06:21

## 2022-01-01 RX ADMIN — Medication 650 MILLIGRAM(S): at 07:50

## 2022-01-01 RX ADMIN — SENNA PLUS 2 TABLET(S): 8.6 TABLET ORAL at 21:35

## 2022-01-01 RX ADMIN — Medication 90 MILLIGRAM(S): at 05:27

## 2022-01-01 RX ADMIN — SEVELAMER CARBONATE 800 MILLIGRAM(S): 2400 POWDER, FOR SUSPENSION ORAL at 23:21

## 2022-01-01 RX ADMIN — Medication 81 MILLIGRAM(S): at 10:14

## 2022-01-01 RX ADMIN — LIDOCAINE 1 PATCH: 4 CREAM TOPICAL at 16:54

## 2022-01-01 RX ADMIN — DULOXETINE HYDROCHLORIDE 60 MILLIGRAM(S): 30 CAPSULE, DELAYED RELEASE ORAL at 11:41

## 2022-01-01 RX ADMIN — PANTOPRAZOLE SODIUM 40 MILLIGRAM(S): 20 TABLET, DELAYED RELEASE ORAL at 08:19

## 2022-01-01 RX ADMIN — SEVELAMER CARBONATE 800 MILLIGRAM(S): 2400 POWDER, FOR SUSPENSION ORAL at 22:06

## 2022-01-01 RX ADMIN — Medication 650 MILLIGRAM(S): at 13:03

## 2022-01-01 RX ADMIN — SEVELAMER CARBONATE 800 MILLIGRAM(S): 2400 POWDER, FOR SUSPENSION ORAL at 06:14

## 2022-01-01 RX ADMIN — FENTANYL CITRATE 1 PATCH: 50 INJECTION INTRAVENOUS at 22:23

## 2022-01-01 RX ADMIN — HEPARIN SODIUM 1200 UNIT(S)/HR: 5000 INJECTION INTRAVENOUS; SUBCUTANEOUS at 20:32

## 2022-01-01 RX ADMIN — ISOSORBIDE MONONITRATE 30 MILLIGRAM(S): 60 TABLET, EXTENDED RELEASE ORAL at 11:31

## 2022-01-01 RX ADMIN — Medication 81 MILLIGRAM(S): at 10:21

## 2022-01-01 RX ADMIN — Medication 650 MILLIGRAM(S): at 05:46

## 2022-01-01 RX ADMIN — HEPARIN SODIUM 1500 UNIT(S)/HR: 5000 INJECTION INTRAVENOUS; SUBCUTANEOUS at 22:41

## 2022-01-01 RX ADMIN — DULOXETINE HYDROCHLORIDE 60 MILLIGRAM(S): 30 CAPSULE, DELAYED RELEASE ORAL at 08:55

## 2022-01-01 RX ADMIN — Medication 0.5 MILLIGRAM(S): at 11:30

## 2022-01-01 RX ADMIN — Medication 1 APPLICATION(S): at 05:00

## 2022-01-01 RX ADMIN — Medication 50 MILLIGRAM(S): at 23:08

## 2022-01-01 RX ADMIN — TAMSULOSIN HYDROCHLORIDE 0.4 MILLIGRAM(S): 0.4 CAPSULE ORAL at 20:20

## 2022-01-01 RX ADMIN — HYDROMORPHONE HYDROCHLORIDE 0.5 MILLIGRAM(S): 2 INJECTION INTRAMUSCULAR; INTRAVENOUS; SUBCUTANEOUS at 04:41

## 2022-01-01 RX ADMIN — SEVELAMER CARBONATE 800 MILLIGRAM(S): 2400 POWDER, FOR SUSPENSION ORAL at 21:50

## 2022-01-01 RX ADMIN — Medication 3 MILLIGRAM(S): at 22:00

## 2022-01-01 RX ADMIN — SEVELAMER CARBONATE 800 MILLIGRAM(S): 2400 POWDER, FOR SUSPENSION ORAL at 05:53

## 2022-01-01 RX ADMIN — Medication 1 TABLET(S): at 21:22

## 2022-01-01 RX ADMIN — Medication 81 MILLIGRAM(S): at 17:25

## 2022-01-01 RX ADMIN — PANTOPRAZOLE SODIUM 40 MILLIGRAM(S): 20 TABLET, DELAYED RELEASE ORAL at 04:48

## 2022-01-01 RX ADMIN — QUETIAPINE FUMARATE 25 MILLIGRAM(S): 200 TABLET, FILM COATED ORAL at 23:00

## 2022-01-01 RX ADMIN — Medication 81 MILLIGRAM(S): at 07:57

## 2022-01-01 RX ADMIN — MIDODRINE HYDROCHLORIDE 10 MILLIGRAM(S): 2.5 TABLET ORAL at 12:32

## 2022-01-01 RX ADMIN — PANTOPRAZOLE SODIUM 40 MILLIGRAM(S): 20 TABLET, DELAYED RELEASE ORAL at 17:02

## 2022-01-01 RX ADMIN — ATORVASTATIN CALCIUM 80 MILLIGRAM(S): 80 TABLET, FILM COATED ORAL at 22:13

## 2022-01-01 RX ADMIN — Medication 1 APPLICATION(S): at 06:04

## 2022-01-01 RX ADMIN — FENTANYL CITRATE 1 PATCH: 50 INJECTION INTRAVENOUS at 09:23

## 2022-01-01 RX ADMIN — Medication 90 MILLIGRAM(S): at 06:47

## 2022-01-01 RX ADMIN — FENTANYL CITRATE 1 PATCH: 50 INJECTION INTRAVENOUS at 00:10

## 2022-01-01 RX ADMIN — ISOSORBIDE MONONITRATE 30 MILLIGRAM(S): 60 TABLET, EXTENDED RELEASE ORAL at 21:22

## 2022-01-01 RX ADMIN — Medication 400 MILLIGRAM(S): at 15:29

## 2022-01-01 RX ADMIN — Medication 975 MILLIGRAM(S): at 18:14

## 2022-01-01 RX ADMIN — Medication 1000 MILLIGRAM(S): at 01:01

## 2022-01-01 RX ADMIN — Medication 1 APPLICATION(S): at 06:19

## 2022-01-01 RX ADMIN — SEVELAMER CARBONATE 800 MILLIGRAM(S): 2400 POWDER, FOR SUSPENSION ORAL at 22:02

## 2022-01-01 RX ADMIN — MEROPENEM 100 MILLIGRAM(S): 1 INJECTION INTRAVENOUS at 08:50

## 2022-01-01 RX ADMIN — Medication 1000 MILLIGRAM(S): at 06:28

## 2022-01-01 RX ADMIN — ENOXAPARIN SODIUM 68 MILLIGRAM(S): 100 INJECTION SUBCUTANEOUS at 17:21

## 2022-01-01 RX ADMIN — Medication 975 MILLIGRAM(S): at 06:27

## 2022-01-01 RX ADMIN — CHLORHEXIDINE GLUCONATE 1 APPLICATION(S): 213 SOLUTION TOPICAL at 13:44

## 2022-01-01 RX ADMIN — Medication 15 MILLIGRAM(S): at 00:59

## 2022-01-01 RX ADMIN — HEPARIN SODIUM 1200 UNIT(S)/HR: 5000 INJECTION INTRAVENOUS; SUBCUTANEOUS at 20:08

## 2022-01-01 RX ADMIN — Medication 400 MILLIGRAM(S): at 00:03

## 2022-01-01 RX ADMIN — Medication 650 MILLIGRAM(S): at 18:18

## 2022-01-01 RX ADMIN — ISOSORBIDE MONONITRATE 30 MILLIGRAM(S): 60 TABLET, EXTENDED RELEASE ORAL at 11:20

## 2022-01-01 RX ADMIN — HYDROMORPHONE HYDROCHLORIDE 0.5 MILLIGRAM(S): 2 INJECTION INTRAMUSCULAR; INTRAVENOUS; SUBCUTANEOUS at 18:18

## 2022-01-01 RX ADMIN — HALOPERIDOL DECANOATE 5 MILLIGRAM(S): 100 INJECTION INTRAMUSCULAR at 01:41

## 2022-01-01 RX ADMIN — SEVELAMER CARBONATE 800 MILLIGRAM(S): 2400 POWDER, FOR SUSPENSION ORAL at 13:21

## 2022-01-01 RX ADMIN — Medication 81 MILLIGRAM(S): at 09:24

## 2022-01-01 RX ADMIN — APIXABAN 2.5 MILLIGRAM(S): 2.5 TABLET, FILM COATED ORAL at 18:20

## 2022-01-01 RX ADMIN — Medication 1 SUPPOSITORY(S): at 14:10

## 2022-01-01 RX ADMIN — DULOXETINE HYDROCHLORIDE 60 MILLIGRAM(S): 30 CAPSULE, DELAYED RELEASE ORAL at 11:57

## 2022-01-01 RX ADMIN — QUETIAPINE FUMARATE 25 MILLIGRAM(S): 200 TABLET, FILM COATED ORAL at 21:44

## 2022-01-01 RX ADMIN — Medication 20 MILLIGRAM(S): at 06:22

## 2022-01-01 RX ADMIN — Medication 650 MILLIGRAM(S): at 13:22

## 2022-01-01 RX ADMIN — Medication 50 MILLIGRAM(S): at 21:40

## 2022-01-01 RX ADMIN — PANTOPRAZOLE SODIUM 40 MILLIGRAM(S): 20 TABLET, DELAYED RELEASE ORAL at 06:49

## 2022-01-01 RX ADMIN — PANTOPRAZOLE SODIUM 40 MILLIGRAM(S): 20 TABLET, DELAYED RELEASE ORAL at 06:34

## 2022-01-01 RX ADMIN — SEVELAMER CARBONATE 800 MILLIGRAM(S): 2400 POWDER, FOR SUSPENSION ORAL at 21:12

## 2022-01-01 RX ADMIN — Medication 1 TABLET(S): at 22:07

## 2022-01-01 RX ADMIN — APIXABAN 2.5 MILLIGRAM(S): 2.5 TABLET, FILM COATED ORAL at 17:16

## 2022-01-01 RX ADMIN — SEVELAMER CARBONATE 800 MILLIGRAM(S): 2400 POWDER, FOR SUSPENSION ORAL at 21:40

## 2022-01-01 RX ADMIN — ISOSORBIDE MONONITRATE 30 MILLIGRAM(S): 60 TABLET, EXTENDED RELEASE ORAL at 18:19

## 2022-01-01 RX ADMIN — ATORVASTATIN CALCIUM 80 MILLIGRAM(S): 80 TABLET, FILM COATED ORAL at 21:45

## 2022-01-01 RX ADMIN — HYDROMORPHONE HYDROCHLORIDE 0.5 MILLIGRAM(S): 2 INJECTION INTRAMUSCULAR; INTRAVENOUS; SUBCUTANEOUS at 22:51

## 2022-01-01 RX ADMIN — Medication 400 MILLIGRAM(S): at 14:20

## 2022-01-01 RX ADMIN — APIXABAN 2.5 MILLIGRAM(S): 2.5 TABLET, FILM COATED ORAL at 05:55

## 2022-01-01 RX ADMIN — HEPARIN SODIUM 1300 UNIT(S)/HR: 5000 INJECTION INTRAVENOUS; SUBCUTANEOUS at 08:50

## 2022-01-01 RX ADMIN — TAMSULOSIN HYDROCHLORIDE 0.4 MILLIGRAM(S): 0.4 CAPSULE ORAL at 21:56

## 2022-01-01 RX ADMIN — CHLORHEXIDINE GLUCONATE 1 APPLICATION(S): 213 SOLUTION TOPICAL at 12:22

## 2022-01-01 RX ADMIN — Medication 81 MILLIGRAM(S): at 21:15

## 2022-01-01 RX ADMIN — LIDOCAINE 1 PATCH: 4 CREAM TOPICAL at 03:18

## 2022-01-01 RX ADMIN — HYDROMORPHONE HYDROCHLORIDE 0.5 MILLIGRAM(S): 2 INJECTION INTRAMUSCULAR; INTRAVENOUS; SUBCUTANEOUS at 10:10

## 2022-01-01 RX ADMIN — HYDROMORPHONE HYDROCHLORIDE 0.5 MILLIGRAM(S): 2 INJECTION INTRAMUSCULAR; INTRAVENOUS; SUBCUTANEOUS at 23:46

## 2022-01-01 RX ADMIN — PANTOPRAZOLE SODIUM 40 MILLIGRAM(S): 20 TABLET, DELAYED RELEASE ORAL at 06:30

## 2022-01-01 RX ADMIN — SEVELAMER CARBONATE 800 MILLIGRAM(S): 2400 POWDER, FOR SUSPENSION ORAL at 22:09

## 2022-01-01 RX ADMIN — Medication 81 MILLIGRAM(S): at 12:01

## 2022-01-01 RX ADMIN — HYDROMORPHONE HYDROCHLORIDE 2 MILLIGRAM(S): 2 INJECTION INTRAMUSCULAR; INTRAVENOUS; SUBCUTANEOUS at 11:07

## 2022-01-01 RX ADMIN — Medication 20 MILLIGRAM(S): at 06:47

## 2022-01-01 RX ADMIN — Medication 90 MILLIGRAM(S): at 06:28

## 2022-01-01 RX ADMIN — QUETIAPINE FUMARATE 25 MILLIGRAM(S): 200 TABLET, FILM COATED ORAL at 21:33

## 2022-01-01 RX ADMIN — Medication 15 MILLIGRAM(S): at 00:26

## 2022-01-01 RX ADMIN — PANTOPRAZOLE SODIUM 40 MILLIGRAM(S): 20 TABLET, DELAYED RELEASE ORAL at 05:29

## 2022-01-01 RX ADMIN — ATORVASTATIN CALCIUM 80 MILLIGRAM(S): 80 TABLET, FILM COATED ORAL at 22:06

## 2022-01-01 RX ADMIN — FENTANYL CITRATE 1 PATCH: 50 INJECTION INTRAVENOUS at 11:16

## 2022-01-01 RX ADMIN — Medication 400 MILLIGRAM(S): at 00:17

## 2022-01-01 RX ADMIN — Medication 100 MILLIGRAM(S): at 21:30

## 2022-01-01 RX ADMIN — Medication 5 MILLIGRAM(S): at 21:36

## 2022-01-01 RX ADMIN — SEVELAMER CARBONATE 800 MILLIGRAM(S): 2400 POWDER, FOR SUSPENSION ORAL at 14:10

## 2022-01-01 RX ADMIN — Medication 20 MILLIGRAM(S): at 05:24

## 2022-01-01 RX ADMIN — SEVELAMER CARBONATE 800 MILLIGRAM(S): 2400 POWDER, FOR SUSPENSION ORAL at 13:10

## 2022-01-01 RX ADMIN — Medication 90 MILLIGRAM(S): at 21:33

## 2022-01-01 RX ADMIN — Medication 5 MILLIGRAM(S): at 22:34

## 2022-01-01 RX ADMIN — ISOSORBIDE MONONITRATE 30 MILLIGRAM(S): 60 TABLET, EXTENDED RELEASE ORAL at 14:22

## 2022-01-01 RX ADMIN — LIDOCAINE 1 PATCH: 4 CREAM TOPICAL at 17:42

## 2022-01-01 RX ADMIN — DIPHENHYDRAMINE HYDROCHLORIDE AND LIDOCAINE HYDROCHLORIDE AND ALUMINUM HYDROXIDE AND MAGNESIUM HYDRO 15 MILLILITER(S): KIT at 16:39

## 2022-01-01 RX ADMIN — Medication 100 MILLIGRAM(S): at 22:00

## 2022-01-01 RX ADMIN — Medication 3 MILLIGRAM(S): at 20:20

## 2022-01-01 RX ADMIN — FENTANYL CITRATE 25 MICROGRAM(S): 50 INJECTION INTRAVENOUS at 09:50

## 2022-01-01 RX ADMIN — ISOSORBIDE MONONITRATE 30 MILLIGRAM(S): 60 TABLET, EXTENDED RELEASE ORAL at 07:57

## 2022-01-01 RX ADMIN — Medication 1 APPLICATION(S): at 17:27

## 2022-01-01 RX ADMIN — HEPARIN SODIUM 5000 UNIT(S): 5000 INJECTION INTRAVENOUS; SUBCUTANEOUS at 20:03

## 2022-01-01 RX ADMIN — LIDOCAINE 1 PATCH: 4 CREAM TOPICAL at 06:40

## 2022-01-01 RX ADMIN — TAMSULOSIN HYDROCHLORIDE 0.4 MILLIGRAM(S): 0.4 CAPSULE ORAL at 22:59

## 2022-01-01 RX ADMIN — Medication 975 MILLIGRAM(S): at 09:23

## 2022-01-01 RX ADMIN — Medication 3 MILLIGRAM(S): at 23:28

## 2022-01-01 RX ADMIN — Medication 81 MILLIGRAM(S): at 13:21

## 2022-01-01 RX ADMIN — HYDROMORPHONE HYDROCHLORIDE 0.5 MILLIGRAM(S): 2 INJECTION INTRAMUSCULAR; INTRAVENOUS; SUBCUTANEOUS at 09:37

## 2022-01-01 RX ADMIN — SENNA PLUS 2 TABLET(S): 8.6 TABLET ORAL at 21:03

## 2022-01-01 RX ADMIN — SEVELAMER CARBONATE 800 MILLIGRAM(S): 2400 POWDER, FOR SUSPENSION ORAL at 08:30

## 2022-01-01 RX ADMIN — Medication 1 TABLET(S): at 12:55

## 2022-01-01 RX ADMIN — ERYTHROPOIETIN 10000 UNIT(S): 10000 INJECTION, SOLUTION INTRAVENOUS; SUBCUTANEOUS at 14:24

## 2022-01-01 RX ADMIN — HEPARIN SODIUM 1400 UNIT(S)/HR: 5000 INJECTION INTRAVENOUS; SUBCUTANEOUS at 19:25

## 2022-01-01 RX ADMIN — Medication 975 MILLIGRAM(S): at 22:10

## 2022-01-01 RX ADMIN — FENTANYL CITRATE 50 MICROGRAM(S): 50 INJECTION INTRAVENOUS at 19:08

## 2022-01-01 RX ADMIN — Medication 650 MILLIGRAM(S): at 18:20

## 2022-01-01 RX ADMIN — Medication 100 MILLIGRAM(S): at 22:39

## 2022-01-01 RX ADMIN — ATORVASTATIN CALCIUM 80 MILLIGRAM(S): 80 TABLET, FILM COATED ORAL at 23:21

## 2022-01-01 RX ADMIN — CHLORHEXIDINE GLUCONATE 1 APPLICATION(S): 213 SOLUTION TOPICAL at 13:59

## 2022-01-01 RX ADMIN — SEVELAMER CARBONATE 800 MILLIGRAM(S): 2400 POWDER, FOR SUSPENSION ORAL at 06:22

## 2022-01-01 RX ADMIN — HYDROMORPHONE HYDROCHLORIDE 2 MILLIGRAM(S): 2 INJECTION INTRAMUSCULAR; INTRAVENOUS; SUBCUTANEOUS at 12:24

## 2022-01-01 RX ADMIN — Medication 400 MILLIGRAM(S): at 17:10

## 2022-01-01 RX ADMIN — ERYTHROPOIETIN 10000 UNIT(S): 10000 INJECTION, SOLUTION INTRAVENOUS; SUBCUTANEOUS at 10:43

## 2022-01-01 RX ADMIN — Medication 3 MILLIGRAM(S): at 22:07

## 2022-01-01 RX ADMIN — Medication 650 MILLIGRAM(S): at 06:21

## 2022-01-01 RX ADMIN — Medication 650 MILLIGRAM(S): at 18:47

## 2022-01-01 RX ADMIN — Medication 650 MILLIGRAM(S): at 12:17

## 2022-01-01 RX ADMIN — Medication 100 MILLIGRAM(S): at 06:48

## 2022-01-01 RX ADMIN — Medication 650 MILLIGRAM(S): at 06:43

## 2022-01-01 RX ADMIN — QUETIAPINE FUMARATE 25 MILLIGRAM(S): 200 TABLET, FILM COATED ORAL at 21:57

## 2022-01-01 RX ADMIN — LIDOCAINE 1 PATCH: 4 CREAM TOPICAL at 09:50

## 2022-01-01 RX ADMIN — Medication 100 MILLIGRAM(S): at 21:18

## 2022-01-01 RX ADMIN — ISOSORBIDE MONONITRATE 30 MILLIGRAM(S): 60 TABLET, EXTENDED RELEASE ORAL at 12:23

## 2022-01-01 RX ADMIN — PANTOPRAZOLE SODIUM 40 MILLIGRAM(S): 20 TABLET, DELAYED RELEASE ORAL at 05:40

## 2022-01-01 RX ADMIN — ISOSORBIDE MONONITRATE 30 MILLIGRAM(S): 60 TABLET, EXTENDED RELEASE ORAL at 11:42

## 2022-01-01 RX ADMIN — HEPARIN SODIUM 1200 UNIT(S)/HR: 5000 INJECTION INTRAVENOUS; SUBCUTANEOUS at 07:30

## 2022-01-01 RX ADMIN — Medication 1 APPLICATION(S): at 17:02

## 2022-01-01 RX ADMIN — ERYTHROPOIETIN 20000 UNIT(S): 10000 INJECTION, SOLUTION INTRAVENOUS; SUBCUTANEOUS at 12:28

## 2022-01-01 RX ADMIN — SEVELAMER CARBONATE 800 MILLIGRAM(S): 2400 POWDER, FOR SUSPENSION ORAL at 15:31

## 2022-01-01 RX ADMIN — Medication 400 MILLIGRAM(S): at 06:13

## 2022-01-01 RX ADMIN — HYDROMORPHONE HYDROCHLORIDE 1 MILLIGRAM(S): 2 INJECTION INTRAMUSCULAR; INTRAVENOUS; SUBCUTANEOUS at 02:23

## 2022-01-01 RX ADMIN — FENTANYL CITRATE 1 PATCH: 50 INJECTION INTRAVENOUS at 19:05

## 2022-01-01 RX ADMIN — HEPARIN SODIUM 1300 UNIT(S)/HR: 5000 INJECTION INTRAVENOUS; SUBCUTANEOUS at 01:45

## 2022-01-01 RX ADMIN — LIDOCAINE 1 PATCH: 4 CREAM TOPICAL at 19:38

## 2022-01-01 RX ADMIN — POLYETHYLENE GLYCOL 3350 17 GRAM(S): 17 POWDER, FOR SOLUTION ORAL at 17:01

## 2022-01-01 RX ADMIN — METHADONE HYDROCHLORIDE 2.5 MILLIGRAM(S): 40 TABLET ORAL at 17:24

## 2022-01-01 RX ADMIN — ATORVASTATIN CALCIUM 80 MILLIGRAM(S): 80 TABLET, FILM COATED ORAL at 21:50

## 2022-01-01 RX ADMIN — HEPARIN SODIUM 5000 UNIT(S): 5000 INJECTION INTRAVENOUS; SUBCUTANEOUS at 06:46

## 2022-01-01 RX ADMIN — Medication 1 TABLET(S): at 13:11

## 2022-01-01 RX ADMIN — HYDROMORPHONE HYDROCHLORIDE 0.5 MILLIGRAM(S): 2 INJECTION INTRAMUSCULAR; INTRAVENOUS; SUBCUTANEOUS at 11:27

## 2022-01-01 RX ADMIN — Medication 975 MILLIGRAM(S): at 16:12

## 2022-01-01 RX ADMIN — SODIUM CHLORIDE 1000 MILLILITER(S): 9 INJECTION, SOLUTION INTRAVENOUS at 15:33

## 2022-01-01 RX ADMIN — Medication 1 SUPPOSITORY(S): at 17:58

## 2022-01-01 RX ADMIN — POLYETHYLENE GLYCOL 3350 17 GRAM(S): 17 POWDER, FOR SOLUTION ORAL at 06:15

## 2022-01-01 RX ADMIN — FENTANYL CITRATE 1 PATCH: 50 INJECTION INTRAVENOUS at 19:15

## 2022-01-01 RX ADMIN — CHLORHEXIDINE GLUCONATE 1 APPLICATION(S): 213 SOLUTION TOPICAL at 06:07

## 2022-01-01 RX ADMIN — Medication 20 MILLIGRAM(S): at 06:17

## 2022-01-01 RX ADMIN — SEVELAMER CARBONATE 800 MILLIGRAM(S): 2400 POWDER, FOR SUSPENSION ORAL at 05:23

## 2022-01-01 RX ADMIN — HEPARIN SODIUM 1300 UNIT(S)/HR: 5000 INJECTION INTRAVENOUS; SUBCUTANEOUS at 18:50

## 2022-01-01 RX ADMIN — Medication 81 MILLIGRAM(S): at 08:47

## 2022-01-01 RX ADMIN — Medication 5 MILLIGRAM(S): at 21:35

## 2022-01-01 RX ADMIN — HEPARIN SODIUM 1200 UNIT(S)/HR: 5000 INJECTION INTRAVENOUS; SUBCUTANEOUS at 07:51

## 2022-01-01 RX ADMIN — SEVELAMER CARBONATE 800 MILLIGRAM(S): 2400 POWDER, FOR SUSPENSION ORAL at 06:30

## 2022-01-01 RX ADMIN — FENTANYL CITRATE 1 PATCH: 50 INJECTION INTRAVENOUS at 19:30

## 2022-01-01 RX ADMIN — Medication 15 MILLIGRAM(S): at 23:27

## 2022-01-01 RX ADMIN — POLYETHYLENE GLYCOL 3350 17 GRAM(S): 17 POWDER, FOR SOLUTION ORAL at 18:30

## 2022-01-01 RX ADMIN — Medication 3 MILLIGRAM(S): at 22:26

## 2022-01-01 RX ADMIN — Medication 15 MILLIGRAM(S): at 15:15

## 2022-01-01 RX ADMIN — PIPERACILLIN AND TAZOBACTAM 25 GRAM(S): 4; .5 INJECTION, POWDER, LYOPHILIZED, FOR SOLUTION INTRAVENOUS at 04:43

## 2022-01-01 RX ADMIN — Medication 650 MILLIGRAM(S): at 00:10

## 2022-01-01 RX ADMIN — ERYTHROPOIETIN 10000 UNIT(S): 10000 INJECTION, SOLUTION INTRAVENOUS; SUBCUTANEOUS at 10:39

## 2022-01-01 RX ADMIN — Medication 125 MILLILITER(S): at 11:32

## 2022-01-01 RX ADMIN — Medication 400 MILLIGRAM(S): at 09:43

## 2022-01-01 RX ADMIN — HEPARIN SODIUM 5000 UNIT(S): 5000 INJECTION INTRAVENOUS; SUBCUTANEOUS at 13:17

## 2022-01-01 RX ADMIN — PANTOPRAZOLE SODIUM 40 MILLIGRAM(S): 20 TABLET, DELAYED RELEASE ORAL at 06:47

## 2022-01-01 RX ADMIN — SEVELAMER CARBONATE 800 MILLIGRAM(S): 2400 POWDER, FOR SUSPENSION ORAL at 09:02

## 2022-01-01 RX ADMIN — FENTANYL CITRATE 1 PATCH: 50 INJECTION INTRAVENOUS at 19:27

## 2022-01-01 RX ADMIN — Medication 650 MILLIGRAM(S): at 11:20

## 2022-01-01 RX ADMIN — CHLORHEXIDINE GLUCONATE 1 APPLICATION(S): 213 SOLUTION TOPICAL at 05:27

## 2022-01-01 RX ADMIN — PANTOPRAZOLE SODIUM 40 MILLIGRAM(S): 20 TABLET, DELAYED RELEASE ORAL at 05:38

## 2022-01-01 RX ADMIN — Medication 500000 UNIT(S): at 16:25

## 2022-01-01 RX ADMIN — ERYTHROPOIETIN 10000 UNIT(S): 10000 INJECTION, SOLUTION INTRAVENOUS; SUBCUTANEOUS at 11:46

## 2022-01-01 RX ADMIN — Medication 975 MILLIGRAM(S): at 06:53

## 2022-01-01 RX ADMIN — Medication 650 MILLIGRAM(S): at 22:00

## 2022-01-01 RX ADMIN — MEROPENEM 100 MILLIGRAM(S): 1 INJECTION INTRAVENOUS at 17:07

## 2022-01-01 RX ADMIN — ATORVASTATIN CALCIUM 80 MILLIGRAM(S): 80 TABLET, FILM COATED ORAL at 21:33

## 2022-01-01 RX ADMIN — HYDROMORPHONE HYDROCHLORIDE 2 MILLIGRAM(S): 2 INJECTION INTRAMUSCULAR; INTRAVENOUS; SUBCUTANEOUS at 04:00

## 2022-01-01 RX ADMIN — SEVELAMER CARBONATE 800 MILLIGRAM(S): 2400 POWDER, FOR SUSPENSION ORAL at 13:35

## 2022-01-01 RX ADMIN — Medication 1 APPLICATION(S): at 05:19

## 2022-01-01 RX ADMIN — SEVELAMER CARBONATE 800 MILLIGRAM(S): 2400 POWDER, FOR SUSPENSION ORAL at 08:24

## 2022-01-01 RX ADMIN — APIXABAN 2.5 MILLIGRAM(S): 2.5 TABLET, FILM COATED ORAL at 18:23

## 2022-01-01 RX ADMIN — PIPERACILLIN AND TAZOBACTAM 25 GRAM(S): 4; .5 INJECTION, POWDER, LYOPHILIZED, FOR SOLUTION INTRAVENOUS at 17:10

## 2022-01-01 RX ADMIN — ERYTHROPOIETIN 10000 UNIT(S): 10000 INJECTION, SOLUTION INTRAVENOUS; SUBCUTANEOUS at 12:35

## 2022-01-01 RX ADMIN — Medication 1 TABLET(S): at 14:39

## 2022-01-01 RX ADMIN — TAMSULOSIN HYDROCHLORIDE 0.4 MILLIGRAM(S): 0.4 CAPSULE ORAL at 21:39

## 2022-01-01 RX ADMIN — Medication 650 MILLIGRAM(S): at 00:05

## 2022-01-01 RX ADMIN — HYDROMORPHONE HYDROCHLORIDE 0.5 MILLIGRAM(S): 2 INJECTION INTRAMUSCULAR; INTRAVENOUS; SUBCUTANEOUS at 17:48

## 2022-01-01 RX ADMIN — PANTOPRAZOLE SODIUM 40 MILLIGRAM(S): 20 TABLET, DELAYED RELEASE ORAL at 06:17

## 2022-01-01 RX ADMIN — DULOXETINE HYDROCHLORIDE 60 MILLIGRAM(S): 30 CAPSULE, DELAYED RELEASE ORAL at 11:07

## 2022-01-01 RX ADMIN — Medication 400 MILLIGRAM(S): at 12:28

## 2022-01-01 RX ADMIN — HYDROMORPHONE HYDROCHLORIDE 0.5 MILLIGRAM(S): 2 INJECTION INTRAMUSCULAR; INTRAVENOUS; SUBCUTANEOUS at 09:10

## 2022-01-01 RX ADMIN — SEVELAMER CARBONATE 800 MILLIGRAM(S): 2400 POWDER, FOR SUSPENSION ORAL at 14:11

## 2022-01-01 RX ADMIN — Medication 15 MILLIGRAM(S): at 21:45

## 2022-01-01 RX ADMIN — APIXABAN 2.5 MILLIGRAM(S): 2.5 TABLET, FILM COATED ORAL at 05:26

## 2022-01-01 RX ADMIN — POLYETHYLENE GLYCOL 3350 17 GRAM(S): 17 POWDER, FOR SOLUTION ORAL at 17:49

## 2022-01-01 RX ADMIN — TAMSULOSIN HYDROCHLORIDE 0.4 MILLIGRAM(S): 0.4 CAPSULE ORAL at 22:08

## 2022-01-01 RX ADMIN — Medication 650 MILLIGRAM(S): at 16:34

## 2022-01-01 RX ADMIN — Medication 20 MILLIGRAM(S): at 06:24

## 2022-01-01 RX ADMIN — ATORVASTATIN CALCIUM 80 MILLIGRAM(S): 80 TABLET, FILM COATED ORAL at 22:26

## 2022-01-01 RX ADMIN — LIDOCAINE 1 PATCH: 4 CREAM TOPICAL at 07:59

## 2022-01-01 RX ADMIN — QUETIAPINE FUMARATE 25 MILLIGRAM(S): 200 TABLET, FILM COATED ORAL at 21:03

## 2022-01-01 RX ADMIN — Medication 1 TABLET(S): at 08:46

## 2022-01-01 RX ADMIN — FENTANYL CITRATE 1 PATCH: 50 INJECTION INTRAVENOUS at 06:00

## 2022-01-01 RX ADMIN — ATORVASTATIN CALCIUM 80 MILLIGRAM(S): 80 TABLET, FILM COATED ORAL at 23:48

## 2022-01-01 RX ADMIN — SEVELAMER CARBONATE 800 MILLIGRAM(S): 2400 POWDER, FOR SUSPENSION ORAL at 05:38

## 2022-01-01 RX ADMIN — Medication 20 MILLIGRAM(S): at 05:11

## 2022-01-01 RX ADMIN — SEVELAMER CARBONATE 800 MILLIGRAM(S): 2400 POWDER, FOR SUSPENSION ORAL at 17:21

## 2022-01-01 RX ADMIN — Medication 50 MILLIGRAM(S): at 21:35

## 2022-01-01 RX ADMIN — Medication 20 MILLIGRAM(S): at 20:30

## 2022-01-01 RX ADMIN — SEVELAMER CARBONATE 800 MILLIGRAM(S): 2400 POWDER, FOR SUSPENSION ORAL at 08:54

## 2022-01-01 RX ADMIN — HEPARIN SODIUM 1200 UNIT(S)/HR: 5000 INJECTION INTRAVENOUS; SUBCUTANEOUS at 12:15

## 2022-01-01 RX ADMIN — HEPARIN SODIUM 5000 UNIT(S): 5000 INJECTION INTRAVENOUS; SUBCUTANEOUS at 21:40

## 2022-01-01 RX ADMIN — DULOXETINE HYDROCHLORIDE 30 MILLIGRAM(S): 30 CAPSULE, DELAYED RELEASE ORAL at 12:29

## 2022-01-01 RX ADMIN — CHLORHEXIDINE GLUCONATE 1 APPLICATION(S): 213 SOLUTION TOPICAL at 06:05

## 2022-01-01 RX ADMIN — SEVELAMER CARBONATE 800 MILLIGRAM(S): 2400 POWDER, FOR SUSPENSION ORAL at 05:40

## 2022-01-01 RX ADMIN — Medication 1 TABLET(S): at 07:57

## 2022-01-01 RX ADMIN — ATORVASTATIN CALCIUM 80 MILLIGRAM(S): 80 TABLET, FILM COATED ORAL at 22:36

## 2022-01-01 RX ADMIN — SEVELAMER CARBONATE 800 MILLIGRAM(S): 2400 POWDER, FOR SUSPENSION ORAL at 05:07

## 2022-01-01 RX ADMIN — Medication 20 MILLIGRAM(S): at 05:26

## 2022-01-01 RX ADMIN — CHLORHEXIDINE GLUCONATE 1 APPLICATION(S): 213 SOLUTION TOPICAL at 13:27

## 2022-01-01 RX ADMIN — Medication 10 MILLIGRAM(S): at 17:49

## 2022-01-01 RX ADMIN — HEPARIN SODIUM 1200 UNIT(S)/HR: 5000 INJECTION INTRAVENOUS; SUBCUTANEOUS at 08:03

## 2022-01-01 RX ADMIN — PANTOPRAZOLE SODIUM 40 MILLIGRAM(S): 20 TABLET, DELAYED RELEASE ORAL at 17:24

## 2022-01-01 RX ADMIN — PANTOPRAZOLE SODIUM 40 MILLIGRAM(S): 20 TABLET, DELAYED RELEASE ORAL at 17:22

## 2022-01-01 RX ADMIN — Medication 1 APPLICATION(S): at 21:29

## 2022-01-01 RX ADMIN — Medication 1 SUPPOSITORY(S): at 02:19

## 2022-01-01 RX ADMIN — Medication 5 MILLIGRAM(S): at 23:35

## 2022-01-01 RX ADMIN — PANTOPRAZOLE SODIUM 40 MILLIGRAM(S): 20 TABLET, DELAYED RELEASE ORAL at 06:05

## 2022-01-01 RX ADMIN — HEPARIN SODIUM 5000 UNIT(S): 5000 INJECTION INTRAVENOUS; SUBCUTANEOUS at 14:20

## 2022-01-01 RX ADMIN — Medication 81 MILLIGRAM(S): at 14:43

## 2022-01-01 RX ADMIN — ISOSORBIDE MONONITRATE 30 MILLIGRAM(S): 60 TABLET, EXTENDED RELEASE ORAL at 22:02

## 2022-01-01 RX ADMIN — FENTANYL CITRATE 1 PATCH: 50 INJECTION INTRAVENOUS at 14:04

## 2022-01-01 RX ADMIN — ISOSORBIDE MONONITRATE 30 MILLIGRAM(S): 60 TABLET, EXTENDED RELEASE ORAL at 23:35

## 2022-01-01 RX ADMIN — Medication 1 APPLICATION(S): at 19:07

## 2022-01-01 RX ADMIN — POLYETHYLENE GLYCOL 3350 17 GRAM(S): 17 POWDER, FOR SOLUTION ORAL at 17:57

## 2022-01-01 RX ADMIN — SEVELAMER CARBONATE 800 MILLIGRAM(S): 2400 POWDER, FOR SUSPENSION ORAL at 20:20

## 2022-01-01 RX ADMIN — SEVELAMER CARBONATE 800 MILLIGRAM(S): 2400 POWDER, FOR SUSPENSION ORAL at 12:55

## 2022-01-01 RX ADMIN — PANTOPRAZOLE SODIUM 40 MILLIGRAM(S): 20 TABLET, DELAYED RELEASE ORAL at 05:47

## 2022-01-01 RX ADMIN — SEVELAMER CARBONATE 800 MILLIGRAM(S): 2400 POWDER, FOR SUSPENSION ORAL at 22:32

## 2022-01-01 RX ADMIN — Medication 100 MILLIGRAM(S): at 16:51

## 2022-01-01 RX ADMIN — Medication 650 MILLIGRAM(S): at 01:10

## 2022-01-01 RX ADMIN — Medication 20 MILLIGRAM(S): at 05:19

## 2022-01-01 RX ADMIN — Medication 1 TABLET(S): at 11:44

## 2022-01-01 RX ADMIN — FENTANYL CITRATE 1 PATCH: 50 INJECTION INTRAVENOUS at 19:32

## 2022-01-01 RX ADMIN — Medication 50 MILLIGRAM(S): at 21:23

## 2022-01-01 RX ADMIN — HEPARIN SODIUM 5000 UNIT(S): 5000 INJECTION INTRAVENOUS; SUBCUTANEOUS at 14:10

## 2022-01-01 RX ADMIN — Medication 400 MILLIGRAM(S): at 21:11

## 2022-01-01 RX ADMIN — FENTANYL CITRATE 1 PATCH: 50 INJECTION INTRAVENOUS at 19:48

## 2022-01-01 RX ADMIN — Medication 1 TABLET(S): at 17:10

## 2022-01-01 RX ADMIN — ATORVASTATIN CALCIUM 80 MILLIGRAM(S): 80 TABLET, FILM COATED ORAL at 21:59

## 2022-01-01 RX ADMIN — Medication 1000 MILLIGRAM(S): at 08:02

## 2022-01-01 RX ADMIN — CHLORHEXIDINE GLUCONATE 1 APPLICATION(S): 213 SOLUTION TOPICAL at 16:40

## 2022-01-01 RX ADMIN — HYDROMORPHONE HYDROCHLORIDE 2 MILLIGRAM(S): 2 INJECTION INTRAMUSCULAR; INTRAVENOUS; SUBCUTANEOUS at 22:32

## 2022-01-01 RX ADMIN — LIDOCAINE 1 PATCH: 4 CREAM TOPICAL at 18:33

## 2022-01-01 RX ADMIN — LIDOCAINE 1 PATCH: 4 CREAM TOPICAL at 05:26

## 2022-01-01 RX ADMIN — HYDROMORPHONE HYDROCHLORIDE 2 MILLIGRAM(S): 2 INJECTION INTRAMUSCULAR; INTRAVENOUS; SUBCUTANEOUS at 13:47

## 2022-01-01 RX ADMIN — APIXABAN 2.5 MILLIGRAM(S): 2.5 TABLET, FILM COATED ORAL at 05:22

## 2022-01-01 RX ADMIN — Medication 650 MILLIGRAM(S): at 06:46

## 2022-01-01 RX ADMIN — Medication 90 MILLIGRAM(S): at 05:56

## 2022-01-01 RX ADMIN — Medication 90 MILLIGRAM(S): at 05:41

## 2022-01-01 RX ADMIN — CHLORHEXIDINE GLUCONATE 1 APPLICATION(S): 213 SOLUTION TOPICAL at 11:55

## 2022-01-01 RX ADMIN — ATORVASTATIN CALCIUM 80 MILLIGRAM(S): 80 TABLET, FILM COATED ORAL at 22:10

## 2022-01-01 RX ADMIN — Medication 1 TABLET(S): at 13:16

## 2022-01-01 RX ADMIN — Medication 81 MILLIGRAM(S): at 13:46

## 2022-01-01 RX ADMIN — SEVELAMER CARBONATE 800 MILLIGRAM(S): 2400 POWDER, FOR SUSPENSION ORAL at 21:51

## 2022-01-01 RX ADMIN — HALOPERIDOL DECANOATE 0.5 MILLIGRAM(S): 100 INJECTION INTRAMUSCULAR at 22:50

## 2022-01-01 RX ADMIN — CHLORHEXIDINE GLUCONATE 1 APPLICATION(S): 213 SOLUTION TOPICAL at 08:23

## 2022-01-01 RX ADMIN — Medication 100 MILLIGRAM(S): at 19:07

## 2022-01-01 RX ADMIN — Medication 1 TABLET(S): at 14:22

## 2022-01-01 RX ADMIN — Medication 1 APPLICATION(S): at 05:59

## 2022-01-01 RX ADMIN — Medication 50 MILLIGRAM(S): at 23:48

## 2022-01-01 RX ADMIN — PIPERACILLIN AND TAZOBACTAM 25 GRAM(S): 4; .5 INJECTION, POWDER, LYOPHILIZED, FOR SOLUTION INTRAVENOUS at 23:15

## 2022-01-01 RX ADMIN — FENTANYL CITRATE 1 PATCH: 50 INJECTION INTRAVENOUS at 20:00

## 2022-01-01 RX ADMIN — POLYETHYLENE GLYCOL 3350 17 GRAM(S): 17 POWDER, FOR SOLUTION ORAL at 05:11

## 2022-01-01 RX ADMIN — HYDROMORPHONE HYDROCHLORIDE 0.5 MILLIGRAM(S): 2 INJECTION INTRAMUSCULAR; INTRAVENOUS; SUBCUTANEOUS at 19:42

## 2022-01-01 RX ADMIN — Medication 1 TABLET(S): at 13:21

## 2022-01-01 RX ADMIN — ISOSORBIDE MONONITRATE 30 MILLIGRAM(S): 60 TABLET, EXTENDED RELEASE ORAL at 11:55

## 2022-01-01 RX ADMIN — PANTOPRAZOLE SODIUM 40 MILLIGRAM(S): 20 TABLET, DELAYED RELEASE ORAL at 17:39

## 2022-01-01 RX ADMIN — SEVELAMER CARBONATE 800 MILLIGRAM(S): 2400 POWDER, FOR SUSPENSION ORAL at 06:20

## 2022-01-01 RX ADMIN — Medication 1 TABLET(S): at 12:38

## 2022-01-01 RX ADMIN — HEPARIN SODIUM 1200 UNIT(S)/HR: 5000 INJECTION INTRAVENOUS; SUBCUTANEOUS at 06:18

## 2022-01-01 RX ADMIN — HEPARIN SODIUM 5000 UNIT(S): 5000 INJECTION INTRAVENOUS; SUBCUTANEOUS at 21:39

## 2022-01-01 RX ADMIN — HEPARIN SODIUM 5000 UNIT(S): 5000 INJECTION INTRAVENOUS; SUBCUTANEOUS at 14:13

## 2022-01-01 RX ADMIN — Medication 650 MILLIGRAM(S): at 12:37

## 2022-01-01 RX ADMIN — ATORVASTATIN CALCIUM 80 MILLIGRAM(S): 80 TABLET, FILM COATED ORAL at 21:39

## 2022-01-01 RX ADMIN — SEVELAMER CARBONATE 800 MILLIGRAM(S): 2400 POWDER, FOR SUSPENSION ORAL at 23:00

## 2022-01-01 RX ADMIN — HEPARIN SODIUM 5000 UNIT(S): 5000 INJECTION INTRAVENOUS; SUBCUTANEOUS at 23:00

## 2022-01-01 RX ADMIN — CHLORHEXIDINE GLUCONATE 1 APPLICATION(S): 213 SOLUTION TOPICAL at 06:03

## 2022-01-01 RX ADMIN — Medication 3 MILLIGRAM(S): at 21:58

## 2022-01-01 RX ADMIN — HEPARIN SODIUM 5000 UNIT(S): 5000 INJECTION INTRAVENOUS; SUBCUTANEOUS at 05:46

## 2022-01-01 RX ADMIN — SEVELAMER CARBONATE 800 MILLIGRAM(S): 2400 POWDER, FOR SUSPENSION ORAL at 14:52

## 2022-01-01 RX ADMIN — Medication 1 APPLICATION(S): at 13:20

## 2022-01-01 RX ADMIN — Medication 81 MILLIGRAM(S): at 14:11

## 2022-01-01 RX ADMIN — FENTANYL CITRATE 1 PATCH: 50 INJECTION INTRAVENOUS at 18:26

## 2022-01-01 RX ADMIN — Medication 650 MILLIGRAM(S): at 14:06

## 2022-01-01 RX ADMIN — Medication 400 MILLIGRAM(S): at 22:24

## 2022-01-01 RX ADMIN — ENOXAPARIN SODIUM 68 MILLIGRAM(S): 100 INJECTION SUBCUTANEOUS at 16:34

## 2022-01-01 RX ADMIN — Medication 50 MILLIGRAM(S): at 21:56

## 2022-01-01 RX ADMIN — DULOXETINE HYDROCHLORIDE 60 MILLIGRAM(S): 30 CAPSULE, DELAYED RELEASE ORAL at 12:08

## 2022-01-01 RX ADMIN — HEPARIN SODIUM 5000 UNIT(S): 5000 INJECTION INTRAVENOUS; SUBCUTANEOUS at 17:58

## 2022-01-01 RX ADMIN — SEVELAMER CARBONATE 800 MILLIGRAM(S): 2400 POWDER, FOR SUSPENSION ORAL at 13:44

## 2022-01-01 RX ADMIN — CHLORHEXIDINE GLUCONATE 1 APPLICATION(S): 213 SOLUTION TOPICAL at 08:38

## 2022-01-01 RX ADMIN — HEPARIN SODIUM 1200 UNIT(S)/HR: 5000 INJECTION INTRAVENOUS; SUBCUTANEOUS at 16:30

## 2022-01-01 RX ADMIN — POLYETHYLENE GLYCOL 3350 17 GRAM(S): 17 POWDER, FOR SOLUTION ORAL at 17:16

## 2022-01-01 RX ADMIN — QUETIAPINE FUMARATE 25 MILLIGRAM(S): 200 TABLET, FILM COATED ORAL at 22:14

## 2022-01-01 RX ADMIN — SEVELAMER CARBONATE 800 MILLIGRAM(S): 2400 POWDER, FOR SUSPENSION ORAL at 13:18

## 2022-01-01 RX ADMIN — Medication 90 MILLIGRAM(S): at 06:34

## 2022-01-01 RX ADMIN — Medication 650 MILLIGRAM(S): at 13:35

## 2022-01-01 RX ADMIN — ERYTHROPOIETIN 10000 UNIT(S): 10000 INJECTION, SOLUTION INTRAVENOUS; SUBCUTANEOUS at 11:15

## 2022-01-01 RX ADMIN — PANTOPRAZOLE SODIUM 40 MILLIGRAM(S): 20 TABLET, DELAYED RELEASE ORAL at 08:01

## 2022-01-01 RX ADMIN — Medication 50 MILLIGRAM(S): at 22:24

## 2022-01-01 RX ADMIN — HYDROMORPHONE HYDROCHLORIDE 0.25 MILLIGRAM(S): 2 INJECTION INTRAMUSCULAR; INTRAVENOUS; SUBCUTANEOUS at 12:05

## 2022-01-01 RX ADMIN — Medication 1 TABLET(S): at 12:53

## 2022-01-01 RX ADMIN — CHLORHEXIDINE GLUCONATE 1 APPLICATION(S): 213 SOLUTION TOPICAL at 06:11

## 2022-01-01 RX ADMIN — PANTOPRAZOLE SODIUM 40 MILLIGRAM(S): 20 TABLET, DELAYED RELEASE ORAL at 08:02

## 2022-01-01 RX ADMIN — ISOSORBIDE MONONITRATE 30 MILLIGRAM(S): 60 TABLET, EXTENDED RELEASE ORAL at 21:32

## 2022-01-01 RX ADMIN — Medication 3 MILLIGRAM(S): at 22:08

## 2022-01-01 RX ADMIN — HEPARIN SODIUM 1200 UNIT(S)/HR: 5000 INJECTION INTRAVENOUS; SUBCUTANEOUS at 08:52

## 2022-01-01 RX ADMIN — HEPARIN SODIUM 5000 UNIT(S): 5000 INJECTION INTRAVENOUS; SUBCUTANEOUS at 06:43

## 2022-01-01 RX ADMIN — SEVELAMER CARBONATE 800 MILLIGRAM(S): 2400 POWDER, FOR SUSPENSION ORAL at 05:18

## 2022-01-01 RX ADMIN — SEVELAMER CARBONATE 800 MILLIGRAM(S): 2400 POWDER, FOR SUSPENSION ORAL at 17:58

## 2022-01-01 RX ADMIN — PANTOPRAZOLE SODIUM 40 MILLIGRAM(S): 20 TABLET, DELAYED RELEASE ORAL at 05:22

## 2022-01-01 RX ADMIN — ISOSORBIDE MONONITRATE 30 MILLIGRAM(S): 60 TABLET, EXTENDED RELEASE ORAL at 13:21

## 2022-01-01 RX ADMIN — SEVELAMER CARBONATE 800 MILLIGRAM(S): 2400 POWDER, FOR SUSPENSION ORAL at 23:48

## 2022-01-01 RX ADMIN — PANTOPRAZOLE SODIUM 40 MILLIGRAM(S): 20 TABLET, DELAYED RELEASE ORAL at 06:15

## 2022-01-01 RX ADMIN — HEPARIN SODIUM 1300 UNIT(S)/HR: 5000 INJECTION INTRAVENOUS; SUBCUTANEOUS at 01:47

## 2022-01-01 RX ADMIN — Medication 50 MILLIGRAM(S): at 21:44

## 2022-01-01 RX ADMIN — CHLORHEXIDINE GLUCONATE 1 APPLICATION(S): 213 SOLUTION TOPICAL at 05:21

## 2022-01-01 RX ADMIN — Medication 50 MILLIGRAM(S): at 22:06

## 2022-01-01 RX ADMIN — Medication 1 TABLET(S): at 13:19

## 2022-01-01 RX ADMIN — ATORVASTATIN CALCIUM 80 MILLIGRAM(S): 80 TABLET, FILM COATED ORAL at 22:15

## 2022-01-01 RX ADMIN — Medication 1 SUPPOSITORY(S): at 05:55

## 2022-01-01 RX ADMIN — HYDROMORPHONE HYDROCHLORIDE 0.25 MILLIGRAM(S): 2 INJECTION INTRAMUSCULAR; INTRAVENOUS; SUBCUTANEOUS at 00:35

## 2022-01-01 RX ADMIN — HEPARIN SODIUM 5000 UNIT(S): 5000 INJECTION INTRAVENOUS; SUBCUTANEOUS at 06:22

## 2022-01-01 RX ADMIN — Medication 975 MILLIGRAM(S): at 16:34

## 2022-01-01 RX ADMIN — HYDROMORPHONE HYDROCHLORIDE 2 MILLIGRAM(S): 2 INJECTION INTRAMUSCULAR; INTRAVENOUS; SUBCUTANEOUS at 00:31

## 2022-01-01 RX ADMIN — PIPERACILLIN AND TAZOBACTAM 25 GRAM(S): 4; .5 INJECTION, POWDER, LYOPHILIZED, FOR SOLUTION INTRAVENOUS at 11:20

## 2022-01-01 RX ADMIN — SEVELAMER CARBONATE 800 MILLIGRAM(S): 2400 POWDER, FOR SUSPENSION ORAL at 13:36

## 2022-01-01 RX ADMIN — CHLORHEXIDINE GLUCONATE 1 APPLICATION(S): 213 SOLUTION TOPICAL at 06:32

## 2022-01-01 RX ADMIN — CHLORHEXIDINE GLUCONATE 1 APPLICATION(S): 213 SOLUTION TOPICAL at 05:26

## 2022-01-01 RX ADMIN — HEPARIN SODIUM 5000 UNIT(S): 5000 INJECTION INTRAVENOUS; SUBCUTANEOUS at 18:16

## 2022-01-01 RX ADMIN — SEVELAMER CARBONATE 800 MILLIGRAM(S): 2400 POWDER, FOR SUSPENSION ORAL at 13:03

## 2022-01-01 RX ADMIN — HEPARIN SODIUM 5000 UNIT(S): 5000 INJECTION INTRAVENOUS; SUBCUTANEOUS at 05:21

## 2022-01-01 RX ADMIN — Medication 400 MILLIGRAM(S): at 23:30

## 2022-01-01 RX ADMIN — HEPARIN SODIUM 5000 UNIT(S): 5000 INJECTION INTRAVENOUS; SUBCUTANEOUS at 17:43

## 2022-01-01 RX ADMIN — PANTOPRAZOLE SODIUM 40 MILLIGRAM(S): 20 TABLET, DELAYED RELEASE ORAL at 05:21

## 2022-01-01 RX ADMIN — SEVELAMER CARBONATE 800 MILLIGRAM(S): 2400 POWDER, FOR SUSPENSION ORAL at 05:13

## 2022-01-01 RX ADMIN — Medication 90 MILLIGRAM(S): at 10:21

## 2022-01-01 RX ADMIN — Medication 81 MILLIGRAM(S): at 10:29

## 2022-01-01 RX ADMIN — FENTANYL CITRATE 1 PATCH: 50 INJECTION INTRAVENOUS at 22:10

## 2022-01-01 RX ADMIN — Medication 90 MILLIGRAM(S): at 06:22

## 2022-01-01 RX ADMIN — FENTANYL CITRATE 1 PATCH: 50 INJECTION INTRAVENOUS at 07:52

## 2022-01-01 RX ADMIN — Medication 1 TABLET(S): at 17:45

## 2022-01-01 RX ADMIN — TAMSULOSIN HYDROCHLORIDE 0.4 MILLIGRAM(S): 0.4 CAPSULE ORAL at 21:03

## 2022-01-01 RX ADMIN — HEPARIN SODIUM 1300 UNIT(S)/HR: 5000 INJECTION INTRAVENOUS; SUBCUTANEOUS at 09:33

## 2022-01-01 RX ADMIN — Medication 1 TABLET(S): at 11:17

## 2022-01-01 RX ADMIN — PANTOPRAZOLE SODIUM 40 MILLIGRAM(S): 20 TABLET, DELAYED RELEASE ORAL at 05:53

## 2022-01-01 RX ADMIN — Medication 400 MILLIGRAM(S): at 20:38

## 2022-01-01 RX ADMIN — APIXABAN 2.5 MILLIGRAM(S): 2.5 TABLET, FILM COATED ORAL at 05:12

## 2022-01-01 RX ADMIN — Medication 1 SUPPOSITORY(S): at 07:52

## 2022-01-01 RX ADMIN — Medication 5 MILLIGRAM(S): at 22:02

## 2022-01-01 RX ADMIN — Medication 975 MILLIGRAM(S): at 04:48

## 2022-01-01 RX ADMIN — HEPARIN SODIUM 5000 UNIT(S): 5000 INJECTION INTRAVENOUS; SUBCUTANEOUS at 06:19

## 2022-01-01 RX ADMIN — Medication 100 MILLIGRAM(S): at 21:33

## 2022-01-01 RX ADMIN — Medication 20 MILLIGRAM(S): at 05:12

## 2022-01-01 RX ADMIN — ATORVASTATIN CALCIUM 80 MILLIGRAM(S): 80 TABLET, FILM COATED ORAL at 22:02

## 2022-01-01 RX ADMIN — Medication 400 MILLIGRAM(S): at 22:30

## 2022-01-01 RX ADMIN — CHLORHEXIDINE GLUCONATE 1 APPLICATION(S): 213 SOLUTION TOPICAL at 12:46

## 2022-01-01 RX ADMIN — Medication 500000 UNIT(S): at 14:43

## 2022-01-01 RX ADMIN — Medication 650 MILLIGRAM(S): at 07:00

## 2022-01-01 RX ADMIN — Medication 5 MILLIGRAM(S): at 22:08

## 2022-01-01 RX ADMIN — Medication 81 MILLIGRAM(S): at 12:48

## 2022-01-01 RX ADMIN — ATORVASTATIN CALCIUM 80 MILLIGRAM(S): 80 TABLET, FILM COATED ORAL at 22:14

## 2022-01-01 RX ADMIN — Medication 650 MILLIGRAM(S): at 22:40

## 2022-01-01 RX ADMIN — Medication 90 MILLIGRAM(S): at 05:53

## 2022-01-01 RX ADMIN — Medication 50 MILLIGRAM(S): at 22:16

## 2022-01-01 RX ADMIN — Medication 1000 MILLIGRAM(S): at 00:32

## 2022-01-01 RX ADMIN — Medication 100 MILLIGRAM(S): at 17:14

## 2022-01-01 RX ADMIN — Medication 81 MILLIGRAM(S): at 11:31

## 2022-01-01 RX ADMIN — TAMSULOSIN HYDROCHLORIDE 0.4 MILLIGRAM(S): 0.4 CAPSULE ORAL at 14:48

## 2022-01-01 RX ADMIN — Medication 650 MILLIGRAM(S): at 13:51

## 2022-01-01 RX ADMIN — ERYTHROPOIETIN 10000 UNIT(S): 10000 INJECTION, SOLUTION INTRAVENOUS; SUBCUTANEOUS at 11:35

## 2022-01-01 RX ADMIN — PIPERACILLIN AND TAZOBACTAM 25 GRAM(S): 4; .5 INJECTION, POWDER, LYOPHILIZED, FOR SOLUTION INTRAVENOUS at 22:39

## 2022-01-01 RX ADMIN — DULOXETINE HYDROCHLORIDE 60 MILLIGRAM(S): 30 CAPSULE, DELAYED RELEASE ORAL at 21:54

## 2022-01-01 RX ADMIN — Medication 650 MILLIGRAM(S): at 11:19

## 2022-01-01 RX ADMIN — Medication 1 TABLET(S): at 12:37

## 2022-01-01 RX ADMIN — HEPARIN SODIUM 5000 UNIT(S): 5000 INJECTION INTRAVENOUS; SUBCUTANEOUS at 12:06

## 2022-01-01 RX ADMIN — ISOSORBIDE MONONITRATE 30 MILLIGRAM(S): 60 TABLET, EXTENDED RELEASE ORAL at 11:52

## 2022-01-01 RX ADMIN — PANTOPRAZOLE SODIUM 40 MILLIGRAM(S): 20 TABLET, DELAYED RELEASE ORAL at 06:06

## 2022-01-01 RX ADMIN — PIPERACILLIN AND TAZOBACTAM 25 GRAM(S): 4; .5 INJECTION, POWDER, LYOPHILIZED, FOR SOLUTION INTRAVENOUS at 11:41

## 2022-01-01 RX ADMIN — SEVELAMER CARBONATE 800 MILLIGRAM(S): 2400 POWDER, FOR SUSPENSION ORAL at 18:18

## 2022-01-01 RX ADMIN — Medication 81 MILLIGRAM(S): at 14:36

## 2022-01-01 RX ADMIN — ISOSORBIDE MONONITRATE 30 MILLIGRAM(S): 60 TABLET, EXTENDED RELEASE ORAL at 13:19

## 2022-01-01 RX ADMIN — HYDROMORPHONE HYDROCHLORIDE 0.25 MILLIGRAM(S): 2 INJECTION INTRAMUSCULAR; INTRAVENOUS; SUBCUTANEOUS at 00:17

## 2022-01-01 RX ADMIN — SEVELAMER CARBONATE 800 MILLIGRAM(S): 2400 POWDER, FOR SUSPENSION ORAL at 23:33

## 2022-01-01 RX ADMIN — HEPARIN SODIUM 5000 UNIT(S): 5000 INJECTION INTRAVENOUS; SUBCUTANEOUS at 05:25

## 2022-01-01 RX ADMIN — CHLORHEXIDINE GLUCONATE 1 APPLICATION(S): 213 SOLUTION TOPICAL at 07:02

## 2022-01-01 RX ADMIN — Medication 650 MILLIGRAM(S): at 01:53

## 2022-01-01 RX ADMIN — PANTOPRAZOLE SODIUM 40 MILLIGRAM(S): 20 TABLET, DELAYED RELEASE ORAL at 05:18

## 2022-01-01 RX ADMIN — HYDROMORPHONE HYDROCHLORIDE 0.5 MILLIGRAM(S): 2 INJECTION INTRAMUSCULAR; INTRAVENOUS; SUBCUTANEOUS at 03:00

## 2022-01-01 RX ADMIN — HYDROMORPHONE HYDROCHLORIDE 0.5 MILLIGRAM(S): 2 INJECTION INTRAMUSCULAR; INTRAVENOUS; SUBCUTANEOUS at 20:45

## 2022-01-01 RX ADMIN — Medication 1000 MILLIGRAM(S): at 22:00

## 2022-01-01 RX ADMIN — HYDROMORPHONE HYDROCHLORIDE 2 MILLIGRAM(S): 2 INJECTION INTRAMUSCULAR; INTRAVENOUS; SUBCUTANEOUS at 11:41

## 2022-01-01 RX ADMIN — Medication 100 MILLIGRAM(S): at 18:23

## 2022-01-01 RX ADMIN — Medication 650 MILLIGRAM(S): at 06:45

## 2022-01-01 RX ADMIN — HEPARIN SODIUM 5000 UNIT(S): 5000 INJECTION INTRAVENOUS; SUBCUTANEOUS at 21:18

## 2022-01-01 RX ADMIN — ERYTHROPOIETIN 10000 UNIT(S): 10000 INJECTION, SOLUTION INTRAVENOUS; SUBCUTANEOUS at 13:36

## 2022-01-01 RX ADMIN — HEPARIN SODIUM 5000 UNIT(S): 5000 INJECTION INTRAVENOUS; SUBCUTANEOUS at 18:53

## 2022-01-01 RX ADMIN — Medication 1 TABLET(S): at 22:37

## 2022-01-01 RX ADMIN — Medication 1 TABLET(S): at 17:44

## 2022-01-01 RX ADMIN — HYDROMORPHONE HYDROCHLORIDE 0.5 MILLIGRAM(S): 2 INJECTION INTRAMUSCULAR; INTRAVENOUS; SUBCUTANEOUS at 22:36

## 2022-01-01 RX ADMIN — Medication 1000 MILLIGRAM(S): at 11:18

## 2022-01-01 RX ADMIN — MEROPENEM 100 MILLIGRAM(S): 1 INJECTION INTRAVENOUS at 05:38

## 2022-01-01 RX ADMIN — FENTANYL CITRATE 1 PATCH: 50 INJECTION INTRAVENOUS at 00:20

## 2022-01-01 RX ADMIN — SEVELAMER CARBONATE 800 MILLIGRAM(S): 2400 POWDER, FOR SUSPENSION ORAL at 21:17

## 2022-01-01 RX ADMIN — Medication 650 MILLIGRAM(S): at 18:14

## 2022-01-01 RX ADMIN — ERYTHROPOIETIN 10000 UNIT(S): 10000 INJECTION, SOLUTION INTRAVENOUS; SUBCUTANEOUS at 15:59

## 2022-01-01 RX ADMIN — Medication 15 MILLIGRAM(S): at 01:58

## 2022-01-01 RX ADMIN — Medication 975 MILLIGRAM(S): at 10:23

## 2022-01-01 RX ADMIN — ATORVASTATIN CALCIUM 80 MILLIGRAM(S): 80 TABLET, FILM COATED ORAL at 22:42

## 2022-01-01 RX ADMIN — ATORVASTATIN CALCIUM 80 MILLIGRAM(S): 80 TABLET, FILM COATED ORAL at 23:06

## 2022-01-01 RX ADMIN — HYDROMORPHONE HYDROCHLORIDE 2 MILLIGRAM(S): 2 INJECTION INTRAMUSCULAR; INTRAVENOUS; SUBCUTANEOUS at 01:31

## 2022-01-01 RX ADMIN — Medication 650 MILLIGRAM(S): at 19:20

## 2022-01-01 RX ADMIN — TAMSULOSIN HYDROCHLORIDE 0.4 MILLIGRAM(S): 0.4 CAPSULE ORAL at 23:21

## 2022-01-01 RX ADMIN — SEVELAMER CARBONATE 800 MILLIGRAM(S): 2400 POWDER, FOR SUSPENSION ORAL at 08:29

## 2022-01-01 RX ADMIN — Medication 400 MILLIGRAM(S): at 04:40

## 2022-01-01 RX ADMIN — HEPARIN SODIUM 5000 UNIT(S): 5000 INJECTION INTRAVENOUS; SUBCUTANEOUS at 05:45

## 2022-01-01 RX ADMIN — LIDOCAINE 1 PATCH: 4 CREAM TOPICAL at 19:15

## 2022-01-01 RX ADMIN — CHLORHEXIDINE GLUCONATE 1 APPLICATION(S): 213 SOLUTION TOPICAL at 16:24

## 2022-01-01 RX ADMIN — HEPARIN SODIUM 5000 UNIT(S): 5000 INJECTION INTRAVENOUS; SUBCUTANEOUS at 06:04

## 2022-01-01 RX ADMIN — ISOSORBIDE MONONITRATE 30 MILLIGRAM(S): 60 TABLET, EXTENDED RELEASE ORAL at 22:29

## 2022-01-01 RX ADMIN — Medication 81 MILLIGRAM(S): at 12:51

## 2022-01-01 RX ADMIN — Medication 1 SUPPOSITORY(S): at 18:20

## 2022-01-01 RX ADMIN — Medication 975 MILLIGRAM(S): at 08:55

## 2022-01-01 RX ADMIN — Medication 100 MILLIGRAM(S): at 17:52

## 2022-01-01 RX ADMIN — Medication 400 MILLIGRAM(S): at 21:33

## 2022-01-01 RX ADMIN — HEPARIN SODIUM 1200 UNIT(S)/HR: 5000 INJECTION INTRAVENOUS; SUBCUTANEOUS at 07:21

## 2022-01-01 RX ADMIN — POLYETHYLENE GLYCOL 3350 17 GRAM(S): 17 POWDER, FOR SOLUTION ORAL at 17:18

## 2022-01-01 RX ADMIN — Medication 650 MILLIGRAM(S): at 17:57

## 2022-01-01 RX ADMIN — LIDOCAINE 1 PATCH: 4 CREAM TOPICAL at 05:00

## 2022-01-01 RX ADMIN — ISOSORBIDE MONONITRATE 30 MILLIGRAM(S): 60 TABLET, EXTENDED RELEASE ORAL at 12:19

## 2022-01-01 RX ADMIN — FENTANYL CITRATE 1 PATCH: 50 INJECTION INTRAVENOUS at 19:52

## 2022-01-01 RX ADMIN — SEVELAMER CARBONATE 800 MILLIGRAM(S): 2400 POWDER, FOR SUSPENSION ORAL at 18:22

## 2022-01-01 RX ADMIN — Medication 650 MILLIGRAM(S): at 23:30

## 2022-01-01 RX ADMIN — HYDROMORPHONE HYDROCHLORIDE 0.5 MILLIGRAM(S): 2 INJECTION INTRAMUSCULAR; INTRAVENOUS; SUBCUTANEOUS at 04:00

## 2022-01-01 RX ADMIN — ERYTHROPOIETIN 10000 UNIT(S): 10000 INJECTION, SOLUTION INTRAVENOUS; SUBCUTANEOUS at 19:50

## 2022-01-01 RX ADMIN — Medication 650 MILLIGRAM(S): at 06:14

## 2022-01-01 RX ADMIN — Medication 15 MILLIGRAM(S): at 22:50

## 2022-01-01 RX ADMIN — SEVELAMER CARBONATE 800 MILLIGRAM(S): 2400 POWDER, FOR SUSPENSION ORAL at 17:25

## 2022-01-01 RX ADMIN — DULOXETINE HYDROCHLORIDE 60 MILLIGRAM(S): 30 CAPSULE, DELAYED RELEASE ORAL at 14:22

## 2022-01-01 RX ADMIN — FENTANYL CITRATE 25 MICROGRAM(S): 50 INJECTION INTRAVENOUS at 09:37

## 2022-01-01 RX ADMIN — PIPERACILLIN AND TAZOBACTAM 25 GRAM(S): 4; .5 INJECTION, POWDER, LYOPHILIZED, FOR SOLUTION INTRAVENOUS at 01:37

## 2022-01-01 RX ADMIN — ERYTHROPOIETIN 10000 UNIT(S): 10000 INJECTION, SOLUTION INTRAVENOUS; SUBCUTANEOUS at 16:00

## 2022-01-01 RX ADMIN — APIXABAN 2.5 MILLIGRAM(S): 2.5 TABLET, FILM COATED ORAL at 17:58

## 2022-01-01 NOTE — ED ADULT TRIAGE NOTE - AS HEIGHT TYPE
Verbal/written post procedure instructions were given to patient/caregiver
Verbal/written post procedure instructions were given to patient/caregiver
stated

## 2022-01-26 NOTE — PHYSICAL EXAM
[Restricted in physically strenuous activity but ambulatory and able to carry out work of a light or sedentary nature] : Status 1- Restricted in physically strenuous activity but ambulatory and able to carry out work of a light or sedentary nature, e.g., light house work, office work [Normal] : affect appropriate [de-identified] : + murmur Moderna dose 1 and 2

## 2022-03-08 NOTE — CONSULT NOTE ADULT - SUBJECTIVE AND OBJECTIVE BOX
[facility], THE HEART CENTER                                   [address] [place] [address]                                                      PHONE: [phone]                                                         FAX: [phone]  [email or web address]  ---------------------------------------------------------------------------------------------------------------------------------    Reason for Consult: palpitations    HPI:  CHRISTIANO ELIZABETH is an 85y Male with HTN DM ESRD with creat 4-5 range, nonobst CAD normal EF, mod AS mod MS mid to mod MR, h/o PAF and NSVT off flecainide, chronic anemia, h/o HFpEF, who was seen in office 2 weeks ago with mild volume overload and recd IV lasix in office.  Pt came to ER c/o weakness, palpitations    PAST MEDICAL & SURGICAL HISTORY:  Risk factors for obstructive sleep apnea  Anemia  RICHARDS (dyspnea on exertion)  VT (ventricular tachycardia)  HTN (hypertension)  CAD (coronary artery disease)  CKD (chronic kidney disease): stage IV  Arrhythmia  AV block, 1st degree  DM (diabetes mellitus)  H/O carotid endarterectomy: Right  A-V fistula: left arm 5/2017  H/O angioplasty: 2013,  no  intervention  H/O left knee surgery  H/O circumcision: at  age  65      Plavix (Hives)  Toprol-XL (Rash)      MEDICATIONS  (STANDING):    MEDICATIONS  (PRN):      Social History:  Cigarettes:    neg                Alchohol:   neg              Illicit Drug Abuse:  neg  neg FH CAD    ROS: Negative other than as mentioned in HPI.    Vital Signs Last 24 Hrs  T(C): 36.7 (08 May 2020 15:10), Max: 36.7 (08 May 2020 15:10)  T(F): 98 (08 May 2020 15:10), Max: 98 (08 May 2020 15:10)  HR: 89 (08 May 2020 15:10) (89 - 89)  BP: 177/79 (08 May 2020 15:10) (177/79 - 177/79)  BP(mean): --  RR: 18 (08 May 2020 15:10) (18 - 18)  SpO2: 98% (08 May 2020 15:10) (98% - 98%)  ICU Vital Signs Last 24 Hrs  CHRISTIANO ELIZABETH  I&O's Detail    I&O's Summary    Drug Dosing Weight  CHRISTIANO ELIZABETH      PHYSICAL EXAM:  General: Appears well developed, well nourished alert and cooperative.  HEENT: Head; normocephalic, atraumatic.  Eyes: Pupils reactive, cornea wnl.  Neck: Supple, no nodes adenopathy, no NVD or carotid bruit or thyromegaly.  CARDIOVASCULAR: Normal S1 and S2, No murmur, rub, gallop or lift.   LUNGS: No rales, rhonchi or wheeze. Normal breath sounds bilaterally.  ABDOMEN: Soft, nontender without mass or organomegaly. bowel sounds normoactive.  EXTREMITIES: No clubbing, cyanosis or edema. Distal pulses wnl.   SKIN: warm and dry with normal turgor.  NEURO: Alert/oriented x 3/normal motor exam. No pathologic reflexes.    PSYCH: normal affect.        LABS:          CHRISTIANO ELIZABETH            RADIOLOGY & ADDITIONAL STUDIES:    INTERPRETATION OF TELEMETRY (personally reviewed):    ECG:    ECHO:    STRESS TEST:    CARDIAC CATHETERIZATION:    Assessment and Plan:  In summary, CHRISTIANO ELIZABETH is an 85y Male with past medical history significant for [facility], [facility]                                   [address] [place] [address]                                                      PHONE: [phone]                                                         FAX: [phone]  [email or web address]  ---------------------------------------------------------------------------------------------------------------------------------    Reason for Consult: palpitations    HPI:  CHRISTIANO ELIZABETH is an 85y Male with HTN DM ESRD with creat 4-5 range, nonobst CAD normal EF, mod AS mod MS mid to mod MR, h/o PAF and NSVT off flecainide, chronic anemia, h/o HFpEF, who was seen in office 2 weeks ago with mild volume overload and recd IV lasix in office.  Pt came to ER c/o weakness, palpitations    PAST MEDICAL & SURGICAL HISTORY:  Risk factors for obstructive sleep apnea  Anemia  RICHARDS (dyspnea on exertion)  VT (ventricular tachycardia)  HTN (hypertension)  CAD (coronary artery disease)  CKD (chronic kidney disease): stage IV  Arrhythmia  AV block, 1st degree  DM (diabetes mellitus)  H/O carotid endarterectomy: Right  A-V fistula: left arm 5/2017  H/O angioplasty: 2013,  no  intervention  H/O left knee surgery  H/O circumcision: at  age  65      Plavix (Hives)  Toprol-XL (Rash)      MEDICATIONS  (STANDING):    MEDICATIONS  (PRN):      Social History:  Cigarettes:    neg                Alchohol:   neg              Illicit Drug Abuse:  neg  neg FH CAD    ROS: Negative other than as mentioned in HPI.    Vital Signs Last 24 Hrs  T(C): 36.7 (08 May 2020 15:10), Max: 36.7 (08 May 2020 15:10)  T(F): 98 (08 May 2020 15:10), Max: 98 (08 May 2020 15:10)  HR: 89 (08 May 2020 15:10) (89 - 89)  BP: 177/79 (08 May 2020 15:10) (177/79 - 177/79)  BP(mean): --  RR: 18 (08 May 2020 15:10) (18 - 18)  SpO2: 98% (08 May 2020 15:10) (98% - 98%)  ICU Vital Signs Last 24 Hrs  CHRISTIANO ELIZABETH  I&O's Detail    I&O's Summary    Drug Dosing Weight  CHRISTIANO ELIZABETH      PHYSICAL EXAM:  General: Appears well developed, well nourished alert and cooperative.  HEENT: Head; normocephalic, atraumatic.  Eyes: Pupils reactive, cornea wnl.  Neck: Supple, no nodes adenopathy, no NVD or carotid bruit or thyromegaly.  CARDIOVASCULAR: Normal S1 and S2, No murmur, rub, gallop or lift.   LUNGS: No rales, rhonchi or wheeze. Normal breath sounds bilaterally.  ABDOMEN: Soft, nontender without mass or organomegaly. bowel sounds normoactive.  EXTREMITIES: No clubbing, cyanosis or edema. Distal pulses wnl.   SKIN: warm and dry with normal turgor.  NEURO: Alert/oriented x 3/normal motor exam. No pathologic reflexes.    PSYCH: normal affect.        LABS:          CHRISTIANO ELIZABETH            RADIOLOGY & ADDITIONAL STUDIES:    INTERPRETATION OF TELEMETRY (personally reviewed):    ECG:    ECHO: 3/2020 in office LVH EF 65% mod MS mild to mod MR, mod AS HARSH 1.2 cm2 normal RVSP    STRESS TEST: 4/2018 coronary calcification normal perfusion EF 65%    CARDIAC CATHETERIZATION:< from: Cardiac Cath Lab - Adult (02.03.17 @ 12:41) >  PROCEDURE:  --  Right heart catheterization.  --  Left heart catheterization.  --  Left coronary angiography.  --  Right coronary angiography.  TECHNIQUE: Cardiac catheterization performed electively.  Local anesthetic given. Right femoral artery access. Right femoral vein  access. Right heart catheterization. The procedure was performed utilizing  a catheter. Left heart catheterization. Left coronary artery angiography.  The vessel was injected utilizing a catheter. Right coronary artery  angiography. The vessel was injected utilizing a catheter. RADIATION  EXPOSURE: 7.67 min.  CONTRAST GIVEN: Visipaque 134 ml.  MEDICATIONS GIVEN: Midazolam, 0.5 mg, IV. Fentanyl, 25 mcg, IV. 1%  Lidocaine, 10 ml, subcutaneously.  VENTRICLES: LV not done due to Cr 3.5  CORONARY VESSELS: The coronary circulation is right dominant.  LM:   --  LM: Normal.  LAD:   --  Proximal LAD: There was a 40 % stenosis.  --  Mid LAD: There was a 30 % stenosis.  --  Distal LAD: Angiography showed minor luminal irregularities withno  flow limiting lesions.  CX:   --  Mid circumflex: There was a 50 % stenosis.  --  OM1: There was a 30 % stenosis at the ostium of the vessel segment.  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  --  Mid RCA: There was a 70 % stenosis.  COMPLICATIONS: There were no complications. No complications occurred  during the cath lab visit.  DIAGNOSTIC IMPRESSIONS: Probably significant RCA disease with unusual take  off with non obstructive CAD of LCX and LAD  DIAGNOSTIC RECOMMENDATIONS: Nuclear PET SCAN. Possible future PCI The  patient should continue with the present medications.  INTERVENTIONAL IMPRESSIONS: Probably significant RCA disease with unusual  take off with non obstructive CAD of LCX and LAD  INTERVENTIONAL RECOMMENDATIONS: Nuclear PET SCAN. Possible future PCI  Prepared and signed by  Star Manriquez MD  Signed 02/03/2017 13:11:27  HEMODYNAMIC TABLES    < end of copied text > [place] [facility], [facility]                                   [address] [place] [address]                                                      PHONE: [phone]                                                         FAX: [phone]  [email or web address]  ---------------------------------------------------------------------------------------------------------------------------------    Reason for Consult: palpitations    HPI:  CHRISTIANO ELIZABETH is an 85y Male with HTN DM ESRD with creat 4-5 range, nonobst CAD normal EF, mod AS mod MS mid to mod MR, h/o PAF and NSVT off flecainide, chronic anemia, h/o HFpEF, who was seen in office 2 weeks ago with mild volume overload and recd IV lasix in office.  Pt came to ER c/o weakness, palpitations.  Pt describes orthopnea, mild edema and 5 lb weight gain.  Was seen in office 4/27 with mild volume overload and recd IVlasix    PAST MEDICAL & SURGICAL HISTORY:  Risk factors for obstructive sleep apnea  Anemia  RICHARDS (dyspnea on exertion)  VT (ventricular tachycardia)  HTN (hypertension)  CAD (coronary artery disease)  CKD (chronic kidney disease): stage IV  Arrhythmia  AV block, 1st degree  DM (diabetes mellitus)  H/O carotid endarterectomy: Right  A-V fistula: left arm 5/2017  H/O angioplasty: 2013,  no  intervention  H/O left knee surgery  H/O circumcision: at  age  65      Plavix (Hives)  Toprol-XL (Rash)      MEDICATIONS  (STANDING):    MEDICATIONS  (PRN):      Social History:  Cigarettes:    neg                Alchohol:   neg              Illicit Drug Abuse:  neg  neg FH CAD    ROS: Negative other than as mentioned in HPI.    Vital Signs Last 24 Hrs  T(C): 36.7 (08 May 2020 15:10), Max: 36.7 (08 May 2020 15:10)  T(F): 98 (08 May 2020 15:10), Max: 98 (08 May 2020 15:10)  HR: 89 (08 May 2020 15:10) (89 - 89)  BP: 177/79 (08 May 2020 15:10) (177/79 - 177/79)  BP(mean): --  RR: 18 (08 May 2020 15:10) (18 - 18)  SpO2: 98% (08 May 2020 15:10) (98% - 98%)  ICU Vital Signs Last 24 Hrs  CHRISTIANO ELIZABETH  I&O's Detail    I&O's Summary    Drug Dosing Weight  CHRISTIANO ELIZABETH      PHYSICAL EXAM:  General: Appears well developed, well nourished alert and cooperative.  HEENT: Head; normocephalic, atraumatic.  Eyes: Pupils reactive, cornea wnl.  Neck: Supple, no nodes adenopathy, no NVD or carotid bruit or thyromegaly.  CARDIOVASCULAR: Normal S1 and S2, No murmur, rub, gallop or lift.   LUNGS: decreased BS right base pos HJR  ABDOMEN: Soft, nontender without mass or organomegaly. bowel sounds normoactive.  EXTREMITIES: No clubbing, cyanosis 1-2 + edema . Distal pulses wnl.   SKIN: warm and dry with normal turgor.  NEURO: Alert/oriented x 3/normal motor exam. No pathologic reflexes.    PSYCH: normal affect.        LABS:          CHRISTIANO ELIZABETH            RADIOLOGY & ADDITIONAL STUDIES:    INTERPRETATION OF TELEMETRY (personally reviewed):    ECG: NSR 1st degree AV block LVH with assoc T abnl unchanged    ECHO: 3/2020 in office LVH EF 65% mod MS mild to mod MR, mod AS HARSH 1.2 cm2 normal RVSP    STRESS TEST: 4/2018 coronary calcification normal perfusion EF 65%    CARDIAC CATHETERIZATION:< from: Cardiac Cath Lab - Adult (02.03.17 @ 12:41) >  PROCEDURE:  --  Right heart catheterization.  --  Left heart catheterization.  --  Left coronary angiography.  --  Right coronary angiography.  TECHNIQUE: Cardiac catheterization performed electively.  Local anesthetic given. Right femoral artery access. Right femoral vein  access. Right heart catheterization. The procedure was performed utilizing  a catheter. Left heart catheterization. Left coronary artery angiography.  The vessel was injected utilizing a catheter. Right coronary artery  angiography. The vessel was injected utilizing a catheter. RADIATION  EXPOSURE: 7.67 min.  CONTRAST GIVEN: Visipaque 134 ml.  MEDICATIONS GIVEN: Midazolam, 0.5 mg, IV. Fentanyl, 25 mcg, IV. 1%  Lidocaine, 10 ml, subcutaneously.  VENTRICLES: LV not done due to Cr 3.5  CORONARY VESSELS: The coronary circulation is right dominant.  LM:   --  LM: Normal.  LAD:   --  Proximal LAD: There was a 40 % stenosis.  --  Mid LAD: There was a 30 % stenosis.  --  Distal LAD: Angiography showed minor luminal irregularities withno  flow limiting lesions.  CX:   --  Mid circumflex: There was a 50 % stenosis.  --  OM1: There was a 30 % stenosis at the ostium of the vessel segment.  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  --  Mid RCA: There was a 70 % stenosis.  COMPLICATIONS: There were no complications. No complications occurred  during the cath lab visit.  DIAGNOSTIC IMPRESSIONS: Probably significant RCA disease with unusual take  off with non obstructive CAD of LCX and LAD  DIAGNOSTIC RECOMMENDATIONS: Nuclear PET SCAN. Possible future PCI The  patient should continue with the present medications.  INTERVENTIONAL IMPRESSIONS: Probably significant RCA disease with unusual  take off with non obstructive CAD of LCX and LAD  INTERVENTIONAL RECOMMENDATIONS: Nuclear PET SCAN. Possible future PCI  Prepared and signed by  Star Manriquez MD  Signed 02/03/2017 13:11:27  HEMODYNAMIC TABLES    < end of copied text > [place] [facility], [facility]                                   [address] [place] [address]                                                      PHONE: [phone]                                                         FAX: [phone]  [email or web address]  ---------------------------------------------------------------------------------------------------------------------------------    Reason for Consult: palpitations    HPI:  CHRISTIANO ELIZABETH is an 85y Male with HTN DM ESRD with creat 4-5 range, nonobst CAD normal EF, mod AS mod MS mid to mod MR, h/o PAF and NSVT off flecainide, chronic anemia, h/o HFpEF, who was seen in office 2 weeks ago with mild volume overload and recd IV lasix in office.  Pt came to ER c/o weakness, palpitations.  Pt describes orthopnea, mild edema and 5 lb weight gain.  Was seen in office 4/27 with mild volume overload and recd IVlasix    PAST MEDICAL & SURGICAL HISTORY:  Risk factors for obstructive sleep apnea  Anemia  RICHARDS (dyspnea on exertion)  VT (ventricular tachycardia)  HTN (hypertension)  CAD (coronary artery disease)  CKD (chronic kidney disease): stage IV  Arrhythmia  AV block, 1st degree  DM (diabetes mellitus)  H/O carotid endarterectomy: Right  A-V fistula: left arm 5/2017  H/O angioplasty: 2013,  no  intervention  H/O left knee surgery  H/O circumcision: at  age  65      Plavix (Hives)  Toprol-XL (Rash)      MEDICATIONS  (STANDING):    MEDICATIONS  (PRN):      Social History:  Cigarettes:    neg                Alchohol:   neg              Illicit Drug Abuse:  neg  neg FH CAD    ROS: Negative other than as mentioned in HPI.    Vital Signs Last 24 Hrs  T(C): 36.7 (08 May 2020 15:10), Max: 36.7 (08 May 2020 15:10)  T(F): 98 (08 May 2020 15:10), Max: 98 (08 May 2020 15:10)  HR: 89 (08 May 2020 15:10) (89 - 89)  BP: 177/79 (08 May 2020 15:10) (177/79 - 177/79)  BP(mean): --  RR: 18 (08 May 2020 15:10) (18 - 18)  SpO2: 98% (08 May 2020 15:10) (98% - 98%)  ICU Vital Signs Last 24 Hrs  CHRISTIANO ELIZABETH  I&O's Detail    I&O's Summary    Drug Dosing Weight  CHRISTIANO ELIZABETH      PHYSICAL EXAM:  General: Appears well developed, well nourished alert and cooperative.  HEENT: Head; normocephalic, atraumatic.  Eyes: Pupils reactive, cornea wnl.  Neck: Supple, no nodes adenopathy, no NVD or carotid bruit or thyromegaly.  CARDIOVASCULAR: Normal S1 and S2, No murmur, rub, gallop or lift.   LUNGS: decreased BS right base pos HJR  ABDOMEN: Soft, nontender without mass or organomegaly. bowel sounds normoactive.  EXTREMITIES: No clubbing, cyanosis 1-2 + edema . Distal pulses wnl.   SKIN: warm and dry with normal turgor.  NEURO: Alert/oriented x 3/normal motor exam. No pathologic reflexes.    PSYCH: normal affect.        LABS:          CHRISTIANO ELIZABETH            RADIOLOGY & ADDITIONAL STUDIES:  CXR reviewed CM mild PVC blunting left CPA    INTERPRETATION OF TELEMETRY (personally reviewed):    ECG: NSR 1st degree AV block LVH with assoc T abnl unchanged    ECHO: 3/2020 in office LVH EF 65% mod MS mild to mod MR, mod AS HARSH 1.2 cm2 normal RVSP    STRESS TEST: 4/2018 coronary calcification normal perfusion EF 65%    CARDIAC CATHETERIZATION:< from: Cardiac Cath Lab - Adult (02.03.17 @ 12:41) >  PROCEDURE:  --  Right heart catheterization.  --  Left heart catheterization.  --  Left coronary angiography.  --  Right coronary angiography.  TECHNIQUE: Cardiac catheterization performed electively.  Local anesthetic given. Right femoral artery access. Right femoral vein  access. Right heart catheterization. The procedure was performed utilizing  a catheter. Left heart catheterization. Left coronary artery angiography.  The vessel was injected utilizing a catheter. Right coronary artery  angiography. The vessel was injected utilizing a catheter. RADIATION  EXPOSURE: 7.67 min.  CONTRAST GIVEN: Visipaque 134 ml.  MEDICATIONS GIVEN: Midazolam, 0.5 mg, IV. Fentanyl, 25 mcg, IV. 1%  Lidocaine, 10 ml, subcutaneously.  VENTRICLES: LV not done due to Cr 3.5  CORONARY VESSELS: The coronary circulation is right dominant.  LM:   --  LM: Normal.  LAD:   --  Proximal LAD: There was a 40 % stenosis.  --  Mid LAD: There was a 30 % stenosis.  --  Distal LAD: Angiography showed minor luminal irregularities withno  flow limiting lesions.  CX:   --  Mid circumflex: There was a 50 % stenosis.  --  OM1: There was a 30 % stenosis at the ostium of the vessel segment.  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  --  Mid RCA: There was a 70 % stenosis.  COMPLICATIONS: There were no complications. No complications occurred  during the cath lab visit.  DIAGNOSTIC IMPRESSIONS: Probably significant RCA disease with unusual take  off with non obstructive CAD of LCX and LAD  DIAGNOSTIC RECOMMENDATIONS: Nuclear PET SCAN. Possible future PCI The  patient should continue with the present medications.  INTERVENTIONAL IMPRESSIONS: Probably significant RCA disease with unusual  take off with non obstructive CAD of LCX and LAD  INTERVENTIONAL RECOMMENDATIONS: Nuclear PET SCAN. Possible future PCI  Prepared and signed by  Star Manriquez MD  Signed 02/03/2017 13:11:27  HEMODYNAMIC TABLES    < end of copied text > Not applicable

## 2022-03-13 NOTE — PATIENT PROFILE ADULT - NSPROSPHOSPCHAPLAINYN_GEN_A_NUR
Diagnosed over 6 years ago, follows with [facility] neurology and Hematology  Last IVIG infusion documented December 2021, patient is supposed to receive treatments every 4-6 hours per chart review  Patient has an appointment with [facility] neurology on March 22, 2022 for IVIG infusion      Plan  · Neurology consulted; appreciate recs  · Completed 3 doses IVIG  · Monitor O2  · Ach-R and MUSK antibodies  · Normal cortisol no

## 2022-03-23 NOTE — ED PROVIDER NOTE - TEMPLATE
Patient Contact    Attempt # 1    Was call answered?  No.  Left message on voicemail with information to call me back.    Thais Hughes RN       Respiratory

## 2022-03-29 NOTE — PROGRESS NOTE ADULT - PROBLEM SELECTOR PROBLEM 1
Drop in hemoglobin
Acute anemia
[Follow-Up - Clinic] : a clinic follow-up of
[FreeTextEntry2] : CAD

## 2022-04-05 NOTE — HISTORY OF PRESENT ILLNESS
[FreeTextEntry1] : 88 y/o Male s/p LLE bypass revision with explant of PTFE bypass, CFA to AT bypass with CryoVein 3/25. [de-identified] : Last seen in May 2021\par He now returns with worsening left foot pain that began 7-10 days ago.\par He states that pain is severe and describes it as a burning sensation in his foot

## 2022-04-05 NOTE — ASSESSMENT
[FreeTextEntry1] : 8e y/o Male s/p LLE bypass revision with explant of PTFE bypass, CFA to AT bypass with CryoVein 3/25.\par He now returns with left foot ischemic rest pain that began 1 week ago.\par Arterial duplex performed today shows occlusion of his bypass graft.\par I have discussed these findings with the patient and his daughter. He understands that he is at high risk for limb loss. Will arrange for LLE angiogram with Dr Sorenson. All questions answered

## 2022-04-05 NOTE — PHYSICAL EXAM
[Normal Breath Sounds] : Normal breath sounds [Normal Rate and Rhythm] : normal rate and rhythm [1+] : right 1+ [0] : left 0 [Ankle Swelling (On Exam)] : present [Ankle Swelling On The Left] : moderate [de-identified] : Well appearing [de-identified] : NCAT [FreeTextEntry1] : Left leg slightly cooler with dependant rubor

## 2022-04-06 NOTE — ED PROVIDER NOTE - PHYSICAL EXAMINATION
General: NAD, frail appearing  HEENT: Normocephalic, atraumatic, left eye crusting  Neck: No apparent stiffness or JVD  Pulm: Chest wall symmetric and nontender, lungs clear to ascultation   Cardiac: Regular rate and regular rhythm  Abdomen: Nontender and nondistended  Skin: red swollen left leg with no palpable distal pulses.  Neuro: No motor or sensory deficits above reported baseline  MSK: No deformity or tenderness above reported baseline

## 2022-04-06 NOTE — ED ADULT TRIAGE NOTE - CHIEF COMPLAINT QUOTE
Pt brought in by daughter who states " he has occlusion in his L leg" Pt had bypass a year ago. Sent by vascular team

## 2022-04-06 NOTE — ED ADULT NURSE NOTE - OBJECTIVE STATEMENT
Patient came in for vascular admit pt had left LE fem-pop bypass about 1 year ago and is having swelling, redness and pain.

## 2022-04-06 NOTE — H&P ADULT - ASSESSMENT
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 Sorenson if patient's condition does not worsen   - needs cardiology preoperative risk stratification if he requires open distal revascularization

## 2022-04-06 NOTE — ED ADULT NURSE NOTE - NSIMPLEMENTINTERV_GEN_ALL_ED
Implemented All Fall with Harm Risk Interventions:  [place] to call system. Call bell, personal items and telephone within reach. Instruct patient to call for assistance. Room bathroom lighting operational. Non-slip footwear when patient is off stretcher. Physically safe environment: no spills, clutter or unnecessary equipment. Stretcher in lowest position, wheels locked, appropriate side rails in place. Provide visual cue, wrist band, yellow gown, etc. Monitor gait and stability. Monitor for mental status changes and reorient to person, place, and time. Review medications for side effects contributing to fall risk. Reinforce activity limits and safety measures with patient and family. Provide visual clues: red socks.

## 2022-04-06 NOTE — H&P ADULT - NSHPPHYSICALEXAM_GEN_ALL_CORE
PHYSICAL EXAM:      Constitutional: alert, no distress     Eyes: no scleral icterus, left eye with some mucous crusting     ENMT: atraumatic     Neck: mild JVD     Respiratory: non labored     Cardiovascular: sinus rhythm on monitor     Gastrointestinal: soft, non tender, no pulsatile mass     Genitourinary: no carrera     Rectal: not examined     Extremities: pitting edema to the bilateral lower ext, Left foot cooler than right from distal tibial region to the foot.  Left upper ext with AVF, good thrill and outflow venous distention     Vascular: palpable radials,  Femorals are palpable but unequal (Right > Left),  no papable pedals     Neurological: decreased motor function to the left foot, minimal ablity to dorsi/plantar flex at the ankle, unable to flex toes.  Gross sensation present       Psychiatric: good affect

## 2022-04-06 NOTE — ED PROVIDER NOTE - ATTENDING CONTRIBUTION TO CARE
HPI: 86yo male with PMH ESRD on HD M/F, HFpEF, HTN, hyperlipidemia, Coronary Artery Disease, atrial fibrillation, AS, PAD (s/p LLE fem-pop bypass 3/2021 c/b L femoral DVT) presenting with LLE pain and redness x 1 week. Patient's daughter states that he follows with Dr. Sorenson and was told that his graft had failed and that he needs an angiogram. He has been taking gabapentin without relief. No fevers/chills.  Of note- patient has developed crusting to L eye that started today- no pain, no visual changes    PE:  Gen: NAD  Head: NCAT  HEENT: +yellow crusty discharge to L eye, Oral mucosa moist, normal conjunctiva  CV: RRR no murmurs, edematous LLE without palpable DP, warm  Resp: CTA b/l, no wheezing, rales, rhonchi, no respiratory distress  GI: Abd Soft NTND  Neuro: No focal neuro deficits  MSK: FROM all 4 extremities, no deformity  Skin: LLE red/warm/tender, no crepitus  Psych: Normal affect    MDM: Pt with failed fem-pop bypass- plan to admit to vascular surgery, heparin, and admission to vascular surgery service. Patient also with L conjunctivitis- tx with polytrim. Denisse Soliz DO         I performed a history and physical exam of the patient and discussed their management with the resident. I reviewed the resident's note and agree with the documented findings and plan of care. My medical decision making and observations are found above.

## 2022-04-06 NOTE — H&P ADULT - HISTORY OF PRESENT ILLNESS
87M known to our service, PMhx of  ESRD on HD (M/F), anemia, CHFpEF, arrythmia, HTN, HLD, CAD, a-fib and LLE DVT (used to be on Flecainide and no longer on AC due to GI bleed), AS s/p TAVR, and PAD (RLE fem-pop bypass, revised LLE fem-pop bypass, March 2021, CFA to AT with cryovein ) presents with red, swollen painful left leg worsening for 1 week.  Patient was seen in the office yesterday by Dr. Mason.  A bypass duplex was performed and it was noted that the left bypass graft was thrombosed.  The plan at that time was to schedule an angiogram and recall patient, but the patient pain has been difficult to control at home.  No reports of any recent Gi blood loss, no chest pain, no sob, fever or chills.

## 2022-04-06 NOTE — ED PROVIDER NOTE - CLINICAL SUMMARY MEDICAL DECISION MAKING FREE TEXT BOX
87M hx ESRD on HD (M/F), anemia, CHFpEF, arrythmia, HTN, HLD, CAD, a-fib and LLE DVT (used to be on Flecainide and no longer on AC due to GI bleed), AS s/p TAVR, and PAD (RLE fem-pop bypass, revised LLE fem-pop bypass) presents with red, swollen painful left leg worsening for 1 week, sent in by vascular team who are planning for revision. Admit to vascular.

## 2022-04-06 NOTE — ED ADULT TRIAGE NOTE - SOURCE OF INFORMATION
Brief Operative Note    Patient: Gloria Espinoza 11 year old male    MRN: [id number]    Surgeon(s): Marcia Whitlock MD  Phone Number: [phone]                       Surgeon(s) and Role:     * Marcia Whitlock MD - Primary    Assistant(s): Shalonda Alarcon MD     Pre-Op Diagnosis: appendicitis, acute     Post-Op Diagnosis: purulent appendicitis     Procedure: Procedure(s):  APPENDECTOMY, LAPAROSCOPIC, PEDIATRIC    Anesthesia Type: General                                   Complications: None    Description: refer to full op report    Findings: refer to full op report    Specimens Removed:   ID Type Source Tests Collected by Time   A :  Tissue Appendix SURGICAL PATHOLOGY Marcia Whitlock MD 9/12/2021 1433     Estimated Blood Loss: 2cc    Assistant Tasks: Opening and closing and Removing tissue     Implants: * No implants in log *      I was present for the key portions of the procedure and was immediately available for the non-key portions      
Patient

## 2022-04-07 NOTE — PROGRESS NOTE ADULT - ASSESSMENT
88 yo M with rest pain, Ischemic left lower extremity, chronic. Secondary to failed distal bypass graft.     - Continue heparin ggt at a low dose with no bolus after labs are obtained (History of Gi Bleed)  - pain control  - vein mapping  - will tenatively plan plan for investigative angiogram on friday with Dr. Sorenson if patient's condition does not worsen   - needs cardiology preoperative risk stratification for possible bypass  - Will get CTA with runoff of the legs

## 2022-04-07 NOTE — CONSULT NOTE ADULT - ASSESSMENT
ESRD - HD, ( BIW )     Anemia, On AMY    DM  : Severe Neuropathy,     PAD,  LVH & Valvular Heart Disease, S/P TAVR,     D/W VS , Surgery ,    HD Today,

## 2022-04-07 NOTE — PROGRESS NOTE ADULT - SUBJECTIVE AND OBJECTIVE BOX
INTERVAL HPI/OVERNIGHT EVENTS:  Patient evaluated at bedside. No acute distress. No acute events overnight. Patient reports feeling well overnight. Remains hemodynamically stable and afebrile.     MEDICATIONS  (STANDING):  aspirin  chewable 81 milliGRAM(s) Oral daily  atorvastatin 80 milliGRAM(s) Oral at bedtime  DULoxetine 60 milliGRAM(s) Oral daily  heparin  Infusion.  Unit(s)/Hr (12 mL/Hr) IV Continuous <Continuous>  isosorbide   mononitrate ER Tablet (IMDUR) 30 milliGRAM(s) Oral daily  melatonin 1 milliGRAM(s) Oral at bedtime  multivitamin 1 Tablet(s) Oral daily  NIFEdipine XL 90 milliGRAM(s) Oral daily  NIFEdipine XL 60 milliGRAM(s) Oral <User Schedule>  pantoprazole    Tablet 40 milliGRAM(s) Oral before breakfast  sevelamer carbonate 800 milliGRAM(s) Oral three times a day  torsemide 20 milliGRAM(s) Oral daily    MEDICATIONS  (PRN):  acetaminophen     Tablet .. 650 milliGRAM(s) Oral every 6 hours PRN Temp greater or equal to 38C (100.4F), Mild Pain (1 - 3)  aluminum hydroxide/magnesium hydroxide/simethicone Suspension 30 milliLiter(s) Oral every 8 hours PRN Dyspepsia  HYDROmorphone  Injectable 0.5 milliGRAM(s) IV Push every 4 hours PRN Severe Pain (7 - 10)/break thru pain  ondansetron Injectable 4 milliGRAM(s) IV Push every 6 hours PRN Nausea  oxyCODONE    IR 5 milliGRAM(s) Oral every 4 hours PRN Moderate Pain (4 - 6)  oxyCODONE    IR 10 milliGRAM(s) Oral every 4 hours PRN Severe Pain (7 - 10)      Vital Signs Last 24 Hrs  T(C): 36.7 (07 Apr 2022 05:48), Max: 37.1 (06 Apr 2022 16:44)  T(F): 98 (07 Apr 2022 05:48), Max: 98.8 (06 Apr 2022 16:44)  HR: 79 (07 Apr 2022 05:48) (59 - 82)  BP: 132/70 (07 Apr 2022 05:48) (115/52 - 149/73)  BP(mean): --  RR: 18 (07 Apr 2022 05:48) (18 - 20)  SpO2: 98% (07 Apr 2022 05:48) (86% - 98%)    Constitutional: alert, no distress   Eyes: no scleral icterus, left eye with some mucous crusting   ENMT: atraumatic   Neck: mild JVD   Respiratory: non labored   Cardiovascular: sinus rhythm on monitor   Gastrointestinal: soft, non tender, no pulsatile mass   Genitourinary: no carrera   Rectal: not examined   Extremities: pitting edema to the bilateral lower ext, Left foot cooler than right from distal tibial region to the foot.  Left upper ext with AVF, good thrill and outflow venous distention  Vascular: palpable radials,  Femorals are palpable but unequal (Right > Left),  no papable pedals. Doppler L popliteal and no signals to DP/PT/AT.  Neurological: decreased motor function to the left foot, minimal ablity to dorsi/plantar flex at the ankle, unable to flex toes.  Gross sensation present   Psychiatric: good affect      I&O's Detail      LABS:                        9.3    6.58  )-----------( 165      ( 07 Apr 2022 02:57 )             30.9     04-06    138  |  94<L>  |  52.7<H>  ----------------------------<  154<H>  4.0   |  26.0  |  4.55<H>    Ca    9.5      06 Apr 2022 18:21    TPro  6.9  /  Alb  4.0  /  TBili  0.6  /  DBili  x   /  AST  29  /  ALT  23  /  AlkPhos  188<H>  04-06    PT/INR - ( 06 Apr 2022 18:21 )   PT: 14.5 sec;   INR: 1.25 ratio         PTT - ( 07 Apr 2022 02:57 )  PTT:66.6 sec      RADIOLOGY & ADDITIONAL STUDIES:

## 2022-04-07 NOTE — CONSULT NOTE ADULT - SUBJECTIVE AND OBJECTIVE BOX
[place] [facility] - [facility], THE HEART CENTER                                   [address] [place] [address]                                                      PHONE: [phone]                                                         FAX: [phone]  [email or web address]  ---------------------------------------------------------------------------------------------------------------------------------    Reason for Consult: CV PREOP EVAL    HPI:  CHRISTIANO ELIZABETH is an 87y Male with extensive cardiac Hx as under planning to undergo possible LE revascularization after been found with leg pain at rest.   Cardiac wise he denies any CP or SOB    PAST MEDICAL & SURGICAL HISTORY:  DM (diabetes mellitus)  - resolved    AV block, 1st degree    Arrhythmia    CAD (coronary artery disease)    HTN (hypertension)    VT (ventricular tachycardia)    RICHARDS (dyspnea on exertion)    Anemia    Risk factors for obstructive sleep apnea    ESRD on dialysis  HD on Mondays and Fridays    Aortic stenosis  - s/p valve replacement    GI bleeding  due to ulcerated polyps and colonic AVMs    H/O circumcision  at  age  65    H/O left knee surgery    H/O angioplasty  2013,  no  intervention    A-V fistula  left arm 5/2017    H/O carotid endarterectomy  Right    S/P TAVR (transcatheter aortic valve replacement)    History of loop recorder        Plavix (Hives)  Toprol-XL (Rash)      MEDICATIONS  (STANDING):  aspirin  chewable 81 milliGRAM(s) Oral daily  atorvastatin 80 milliGRAM(s) Oral at bedtime  DULoxetine 60 milliGRAM(s) Oral daily  heparin  Infusion.  Unit(s)/Hr (12 mL/Hr) IV Continuous <Continuous>  isosorbide   mononitrate ER Tablet (IMDUR) 30 milliGRAM(s) Oral daily  melatonin 1 milliGRAM(s) Oral at bedtime  multivitamin 1 Tablet(s) Oral daily  NIFEdipine XL 90 milliGRAM(s) Oral daily  NIFEdipine XL 60 milliGRAM(s) Oral <User Schedule>  pantoprazole    Tablet 40 milliGRAM(s) Oral before breakfast  sevelamer carbonate 800 milliGRAM(s) Oral three times a day  torsemide 20 milliGRAM(s) Oral daily    MEDICATIONS  (PRN):  acetaminophen     Tablet .. 650 milliGRAM(s) Oral every 6 hours PRN Temp greater or equal to 38C (100.4F), Mild Pain (1 - 3)  aluminum hydroxide/magnesium hydroxide/simethicone Suspension 30 milliLiter(s) Oral every 8 hours PRN Dyspepsia  HYDROmorphone  Injectable 0.5 milliGRAM(s) IV Push every 4 hours PRN Severe Pain (7 - 10)/break thru pain  ondansetron Injectable 4 milliGRAM(s) IV Push every 6 hours PRN Nausea  oxyCODONE    IR 5 milliGRAM(s) Oral every 4 hours PRN Moderate Pain (4 - 6)  oxyCODONE    IR 10 milliGRAM(s) Oral every 4 hours PRN Severe Pain (7 - 10)      Social History:  Cigarettes:                    Alchohol:                 Illicit Drug Abuse:      REVIEW OF SYSTEMS:    Constitutional: No fever, weight loss or fatigue  Eyes: No eye pain, visual disturbances, or discharge  ENMT:  No difficulty hearing, tinnitus, vertigo; No sinus or throat pain  Neck: No pain or stiffness  Respiratory: No cough, wheezing, chills or hemoptysis  Cardiovascular: No chest pain, palpitations, shortness of breath, dizziness or leg swelling  Gastrointestinal: No abdominal or epigastric pain. No nausea, vomiting or hematemesis; No diarrhea or constipation. No melena or hematochezia.  Genitourinary: No dysuria, frequency, hematuria or incontinence  Rectal: No pain, hemorrhoids or incontinence  Neurological: No headaches, memory loss, loss of strength, numbness or tremors  Skin: No itching, burning, rashes or lesions   Lymph Nodes: No enlarged glands  Endocrine: No heat or cold intolerance; No hair loss  Musculoskeletal: No joint pain or swelling; No muscle, back or extremity pain  Psychiatric: No depression, anxiety, mood swings or difficulty sleeping  Heme/Lymph: No easy bruising or bleeding gums  Allergy and Immunologic: No hives or eczema    Vital Signs Last 24 Hrs  T(C): 36.7 (07 Apr 2022 10:43), Max: 37.1 (06 Apr 2022 16:44)  T(F): 98.1 (07 Apr 2022 10:43), Max: 98.8 (06 Apr 2022 16:44)  HR: 67 (07 Apr 2022 10:43) (59 - 82)  BP: 127/65 (07 Apr 2022 10:43) (115/52 - 149/73)  BP(mean): --  RR: 18 (07 Apr 2022 10:43) (18 - 20)  SpO2: 96% (07 Apr 2022 10:43) (86% - 98%)  ICU Vital Signs Last 24 Hrs  CHRISTIANO ELIZABETH  I&O's Detail    I&O's Summary    Drug Dosing Weight  CHRISTIANO ELIZABETH      PHYSICAL EXAM:  General: Appears well developed, well nourished alert and cooperative.  HEENT: Head; normocephalic, atraumatic.  Eyes: Pupils reactive, cornea wnl.  Neck: Supple, no nodes adenopathy, no NVD or carotid bruit or thyromegaly.  CARDIOVASCULAR: Normal S1 and S2, No murmur, rub, gallop or lift.   LUNGS: No rales, rhonchi or wheeze. Normal breath sounds bilaterally.  ABDOMEN: Soft, nontender without mass or organomegaly. bowel sounds normoactive.  EXTREMITIES: No clubbing, cyanosis or edema. Distal pulses wnl.   SKIN: warm and dry with normal turgor.  NEURO: Alert/oriented x 3/normal motor exam. No pathologic reflexes.    PSYCH: normal affect.        LABS:                        9.3    6.58  )-----------( 165      ( 07 Apr 2022 02:57 )             30.9     04-07    137  |  93<L>  |  54.5<H>  ----------------------------<  126<H>  4.0   |  25.0  |  4.90<H>    Ca    9.0      07 Apr 2022 08:57  Phos  4.7     04-07  Mg     2.0     04-07    TPro  6.9  /  Alb  4.0  /  TBili  0.6  /  DBili  x   /  AST  29  /  ALT  23  /  AlkPhos  188<H>  04-06    CHRISTIANO ELIZABETH      PT/INR - ( 06 Apr 2022 18:21 )   PT: 14.5 sec;   INR: 1.25 ratio         PTT - ( 07 Apr 2022 02:57 )  PTT:66.6 sec      RADIOLOGY & ADDITIONAL STUDIES:    INTERPRETATION OF TELEMETRY (personally reviewed):    ECG:  V paced    ECHO: < from: TTE Echo Complete w/o Contrast w/ Doppler (11.23.21 @ 17:39) >  Summary:   1. Left ventricular ejection fraction, by visual estimation, is 60 to 65%.   2. Normal global left ventricular systolic function.   3. Spectral Doppler shows pseudonormal pattern of left ventricular myocardial filling (Grade II diastolic dysfunction). Elevated mean left atrial pressure.   4. Normal left ventricular internal cavity size.   5. Moderate to severe left atrial enlargement.   6. There is mild concentric left ventricular hypertrophy.   7. Mildly enlarged right ventricle. RV systolic function is normal.   8. Moderately enlarged right atrium.   9. Moderate to severe mitral annular calcification.  10. Moderate thickening and calcification of the posterior mitral valve leaflet.  11. Mitral valve mean gradient is 12.0 mmHg consistent with moderate mitral stenosis.  12. Moderate mitral valve regurgitation.  13. Patient is s/p TAVR which appears to be functioning normally.  14. Moderate tricuspid regurgitation.  15. Mild pulmonic valve regurgitation.  16. Estimated pulmonary artery systolic pressure is 101.1 mmHg assuming a right atrial pressure of 15 mmHg, which is consistent with severe pulmonary hypertension.  17. There is no evidence of pericardial effusion.    MD Tano Electronically signed on 11/24/2021 at 9:44:43 AM            *** Final ***              JEANETTE MARTIN MD; Attending Cardiologist  This document has been electronically signed. Nov 23 2021  5:39PM    < end of copied text >          CARDIAC CATHETERIZATION: < from: Cardiac Cath Lab - Adult (08.17.20 @ 12:22) >  VENTRICLES: No left ventriculogram was performed.  CORONARY VESSELS: The coronary circulation is co-dominant.  LM:   --  Ostial LM: There was a 30 % stenosis.  LAD:   --  Mid LAD: There was a 50 % stenosis.  --  Distal LAD: There was a 60 % stenosis.  CX:   --  Proximal circumflex: There was a 20 % stenosis.  RCA:   --  Mid RCA: There was a 70 % stenosis. Superior takeoff.  COMPLICATIONS: No complications occurred during the cath lab visit.  DIAGNOSTIC IMPRESSIONS: Severe RCA disease but co-dominant vessel, does not  supply large territory.  Moderate LAD disease- similar to angiogram from 2017.  Peak to peak gardient of 55-60 mmHg- consistent with severe AS.  LVEDP 14 mmHg.  DIAGNOSTIC RECOMMENDATIONS: Medical management of CAD.  TAVR evaluation. (Plavix allergy- may have to consider using effient as  DAPT post TAVR)    < end of copied text >      ACTIVE PROBLEMS:  HEALTH ISSUES - PROBLEM Dx:          Assessment and Plan:    In summary, CHRISTIANO ELIZABETH is an 87y Male with past medical history significant for     1) Preoperative Optimization       -No Cardiovascular Contraindication to proceed with peripheral Angio and possible revascularization       -Patient is acceptable risk for a High risk surgery       -Continue cardiac medications as scheduled       -Postoperative Telemetry, will Follow up with you closely           - SHEILA BEAUCHAMP MD, FACC, KATHY                 [place] [facility] - [facility], [facility]                                   [address] [place] [address]                                                      PHONE: [phone]                                                         FAX: [phone]  [email or web address]  ---------------------------------------------------------------------------------------------------------------------------------    Reason for Consult: CV PREOP EVAL    HPI:  CHRISTIANO ELIZABETH is an 87y Male with extensive cardiac NOCAD Hx AS S/P TAVR S/P PPM CKD on HD planning to undergo L LE revascularization after been found with leg pain at rest.   Hx of extensive peripheral revascularization a year ago   Cardiac wise he denies any CP or SOB  Seen with daughter at bedside    PAST MEDICAL & SURGICAL HISTORY:  DM (diabetes mellitus)  - resolved    AV block, 1st degree    Arrhythmia    CAD (coronary artery disease)    HTN (hypertension)    VT (ventricular tachycardia)    RICHARDS (dyspnea on exertion)    Anemia    Risk factors for obstructive sleep apnea    ESRD on dialysis  HD on Mondays and Fridays    Aortic stenosis  - s/p valve replacement    GI bleeding  due to ulcerated polyps and colonic AVMs    H/O circumcision  at  age  65    H/O left knee surgery    H/O angioplasty  2013,  no  intervention    A-V fistula  left arm 5/2017    H/O carotid endarterectomy  Right    S/P TAVR (transcatheter aortic valve replacement)    History of loop recorder        Plavix (Hives)  Toprol-XL (Rash)      MEDICATIONS  (STANDING):  aspirin  chewable 81 milliGRAM(s) Oral daily  atorvastatin 80 milliGRAM(s) Oral at bedtime  DULoxetine 60 milliGRAM(s) Oral daily  heparin  Infusion.  Unit(s)/Hr (12 mL/Hr) IV Continuous <Continuous>  isosorbide   mononitrate ER Tablet (IMDUR) 30 milliGRAM(s) Oral daily  melatonin 1 milliGRAM(s) Oral at bedtime  multivitamin 1 Tablet(s) Oral daily  NIFEdipine XL 90 milliGRAM(s) Oral daily  NIFEdipine XL 60 milliGRAM(s) Oral <User Schedule>  pantoprazole    Tablet 40 milliGRAM(s) Oral before breakfast  sevelamer carbonate 800 milliGRAM(s) Oral three times a day  torsemide 20 milliGRAM(s) Oral daily    MEDICATIONS  (PRN):  acetaminophen     Tablet .. 650 milliGRAM(s) Oral every 6 hours PRN Temp greater or equal to 38C (100.4F), Mild Pain (1 - 3)  aluminum hydroxide/magnesium hydroxide/simethicone Suspension 30 milliLiter(s) Oral every 8 hours PRN Dyspepsia  HYDROmorphone  Injectable 0.5 milliGRAM(s) IV Push every 4 hours PRN Severe Pain (7 - 10)/break thru pain  ondansetron Injectable 4 milliGRAM(s) IV Push every 6 hours PRN Nausea  oxyCODONE    IR 5 milliGRAM(s) Oral every 4 hours PRN Moderate Pain (4 - 6)  oxyCODONE    IR 10 milliGRAM(s) Oral every 4 hours PRN Severe Pain (7 - 10)      Social History:  Cigarettes:                    Alchohol:                 Illicit Drug Abuse:      REVIEW OF SYSTEMS:    Constitutional: No fever, weight loss or fatigue  Eyes: No eye pain, visual disturbances, or discharge  ENMT:  No difficulty hearing, tinnitus, vertigo; No sinus or throat pain  Neck: No pain or stiffness  Respiratory: No cough, wheezing, chills or hemoptysis  Cardiovascular: No chest pain, palpitations, shortness of breath, dizziness or leg swelling  Gastrointestinal: No abdominal or epigastric pain. No nausea, vomiting or hematemesis; No diarrhea or constipation. No melena or hematochezia.  Genitourinary: No dysuria, frequency, hematuria or incontinence  Rectal: No pain, hemorrhoids or incontinence  Neurological: No headaches, memory loss, loss of strength, numbness or tremors  Skin: No itching, burning, rashes or lesions   Lymph Nodes: No enlarged glands  Endocrine: No heat or cold intolerance; No hair loss  Musculoskeletal: No joint pain or swelling; No muscle, back or extremity pain  Psychiatric: No depression, anxiety, mood swings or difficulty sleeping  Heme/Lymph: No easy bruising or bleeding gums  Allergy and Immunologic: No hives or eczema    Vital Signs Last 24 Hrs  T(C): 36.7 (07 Apr 2022 10:43), Max: 37.1 (06 Apr 2022 16:44)  T(F): 98.1 (07 Apr 2022 10:43), Max: 98.8 (06 Apr 2022 16:44)  HR: 67 (07 Apr 2022 10:43) (59 - 82)  BP: 127/65 (07 Apr 2022 10:43) (115/52 - 149/73)  BP(mean): --  RR: 18 (07 Apr 2022 10:43) (18 - 20)  SpO2: 96% (07 Apr 2022 10:43) (86% - 98%)  ICU Vital Signs Last 24 Hrs  CHRISTIANO ELIZABETH  I&O's Detail    I&O's Summary    Drug Dosing Weight  CHRISTIANO ELIZABETH      PHYSICAL EXAM:  General: Appears well developed, well nourished alert and cooperative.  HEENT: Head; normocephalic, atraumatic.  Eyes: Pupils reactive, cornea wnl.  Neck: Supple, no nodes adenopathy, no NVD or carotid bruit or thyromegaly.  CARDIOVASCULAR: Normal S1 and S2, No murmur, rub, gallop or lift.   LUNGS: No rales, rhonchi or wheeze. Normal breath sounds bilaterally.  ABDOMEN: Soft, nontender without mass or organomegaly. bowel sounds normoactive.  EXTREMITIES: No clubbing, cyanosis or edema. Distal pulses wnl.   SKIN: warm and dry with normal turgor.  NEURO: Alert/oriented x 3/normal motor exam. No pathologic reflexes.    PSYCH: normal affect.        LABS:                        9.3    6.58  )-----------( 165      ( 07 Apr 2022 02:57 )             30.9     04-07    137  |  93<L>  |  54.5<H>  ----------------------------<  126<H>  4.0   |  25.0  |  4.90<H>    Ca    9.0      07 Apr 2022 08:57  Phos  4.7     04-07  Mg     2.0     04-07    TPro  6.9  /  Alb  4.0  /  TBili  0.6  /  DBili  x   /  AST  29  /  ALT  23  /  AlkPhos  188<H>  04-06    CHRISTIANO ELIZABETH      PT/INR - ( 06 Apr 2022 18:21 )   PT: 14.5 sec;   INR: 1.25 ratio         PTT - ( 07 Apr 2022 02:57 )  PTT:66.6 sec      RADIOLOGY & ADDITIONAL STUDIES:    INTERPRETATION OF TELEMETRY (personally reviewed):    ECG:  V paced    ECHO: < from: TTE Echo Complete w/o Contrast w/ Doppler (11.23.21 @ 17:39) >  Summary:   1. Left ventricular ejection fraction, by visual estimation, is 60 to 65%.   2. Normal global left ventricular systolic function.   3. Spectral Doppler shows pseudonormal pattern of left ventricular myocardial filling (Grade II diastolic dysfunction). Elevated mean left atrial pressure.   4. Normal left ventricular internal cavity size.   5. Moderate to severe left atrial enlargement.   6. There is mild concentric left ventricular hypertrophy.   7. Mildly enlarged right ventricle. RV systolic function is normal.   8. Moderately enlarged right atrium.   9. Moderate to severe mitral annular calcification.  10. Moderate thickening and calcification of the posterior mitral valve leaflet.  11. Mitral valve mean gradient is 12.0 mmHg consistent with moderate mitral stenosis.  12. Moderate mitral valve regurgitation.  13. Patient is s/p TAVR which appears to be functioning normally.  14. Moderate tricuspid regurgitation.  15. Mild pulmonic valve regurgitation.  16. Estimated pulmonary artery systolic pressure is 101.1 mmHg assuming a right atrial pressure of 15 mmHg, which is consistent with severe pulmonary hypertension.  17. There is no evidence of pericardial effusion.    MD Tano Electronically signed on 11/24/2021 at 9:44:43 AM            *** Final ***              JEANETTE MARTIN MD; Attending Cardiologist  This document has been electronically signed. Nov 23 2021  5:39PM    < end of copied text >          CARDIAC CATHETERIZATION: < from: Cardiac Cath Lab - Adult (08.17.20 @ 12:22) >  VENTRICLES: No left ventriculogram was performed.  CORONARY VESSELS: The coronary circulation is co-dominant.  LM:   --  Ostial LM: There was a 30 % stenosis.  LAD:   --  Mid LAD: There was a 50 % stenosis.  --  Distal LAD: There was a 60 % stenosis.  CX:   --  Proximal circumflex: There was a 20 % stenosis.  RCA:   --  Mid RCA: There was a 70 % stenosis. Superior takeoff.  COMPLICATIONS: No complications occurred during the cath lab visit.  DIAGNOSTIC IMPRESSIONS: Severe RCA disease but co-dominant vessel, does not  supply large territory.  Moderate LAD disease- similar to angiogram from 2017.  Peak to peak gardient of 55-60 mmHg- consistent with severe AS.  LVEDP 14 mmHg.  DIAGNOSTIC RECOMMENDATIONS: Medical management of CAD.  TAVR evaluation. (Plavix allergy- may have to consider using effient as  DAPT post TAVR)    < end of copied text >      ACTIVE PROBLEMS:  HEALTH ISSUES - PROBLEM Dx:          Assessment and Plan:    In summary  CHRISTIANO ELIZABETH is an 87y Male with extensive cardiac Hx NOCAD Hx AS S/P TAVR S/P PPM CKD on HD planning to undergo L LE revascularization after been found with leg pain at rest.   Hx of extensive peripheral revascularization a year ago   Cardiac wise he denies any CP or SOB  Seen with daughter at bedside          -Pt will need preop CV optimization HD tonight and weekend in preparation for OR likely Monday       -Will obtain TTE TAVR eval Continue cardiac medications as scheduled will increase his Torsemide to 40 mg on the days off HD        -will Follow up with you closely during this admission       -Will need 72 Hrs of telemetry post-op in view of High risk procedure and elevated patient risk for a perioperative CV event.       - Discussed in detail with daughter at bedside         - SHEIAL BEAUCHAMP MD, FACC, KATHY

## 2022-04-07 NOTE — CONSULT NOTE ADULT - SUBJECTIVE AND OBJECTIVE BOX
Patient evaluated at bedside. No acute distress.     No acute events overnight. Patient reports feeling well overnight. Remains hemodynamically stable and afebrile.     PAST HISTORY  --------------------------------------------------------------------------------  PAST MEDICAL & SURGICAL HISTORY:    DM (diabetes mellitus)    AV block, 1st degree    Arrhythmia    CAD (coronary artery disease)    HTN (hypertension)    VT (ventricular tachycardia)    RICHARDS (dyspnea on exertion)    Anemia    Risk factors for obstructive sleep apnea    ESRD on dialysis  HD on  and     Aortic stenosis  - s/p valve replacement    GI bleeding  due to ulcerated polyps and colonic AVMs    H/O circumcision  at  age  65    H/O left knee surgery    H/O angioplasty  ,  no  intervention    A-V fistula  left arm 2017    H/O carotid endarterectomy  Right    S/P TAVR (transcatheter aortic valve replacement)    History of loop recorder      FAMILY HISTORY:  Family history of cancer (Grandparent)    Family history of lung cancer (Grandparent)    Family history of premature CAD    FH: type 2 diabetes      PAST SOCIAL HISTORY:    ALLERGIES & MEDICATIONS  --------------------------------------------------------------------------------  Allergies    Plavix (Hives)  Toprol-XL (Rash)    Intolerances      Standing Inpatient Medications  aspirin  chewable 81 milliGRAM(s) Oral daily  atorvastatin 80 milliGRAM(s) Oral at bedtime  DULoxetine 60 milliGRAM(s) Oral daily  heparin  Infusion.  Unit(s)/Hr IV Continuous <Continuous>  isosorbide   mononitrate ER Tablet (IMDUR) 30 milliGRAM(s) Oral daily  melatonin 1 milliGRAM(s) Oral at bedtime  multivitamin 1 Tablet(s) Oral daily  NIFEdipine XL 90 milliGRAM(s) Oral daily  NIFEdipine XL 60 milliGRAM(s) Oral <User Schedule>  pantoprazole    Tablet 40 milliGRAM(s) Oral before breakfast  sevelamer carbonate 800 milliGRAM(s) Oral three times a day  torsemide 20 milliGRAM(s) Oral daily    PRN Inpatient Medications  acetaminophen     Tablet .. 650 milliGRAM(s) Oral every 6 hours PRN  aluminum hydroxide/magnesium hydroxide/simethicone Suspension 30 milliLiter(s) Oral every 8 hours PRN  HYDROmorphone  Injectable 0.5 milliGRAM(s) IV Push every 4 hours PRN  ondansetron Injectable 4 milliGRAM(s) IV Push every 6 hours PRN  oxyCODONE    IR 5 milliGRAM(s) Oral every 4 hours PRN  oxyCODONE    IR 10 milliGRAM(s) Oral every 4 hours PRN      REVIEW OF SYSTEMS  --------------------------------------------------------------------------------  Gen: + weight changes, fatigue, No fevers/chills, weakness  Skin: No rashes  Head/Eyes/Ears/Mouth: No headache; Normal hearing; Normal vision w/o blurriness; No sinus pain/discomfort, sore throat  Respiratory: No dyspnea, cough, wheezing, hemoptysis  CV: No chest pain, PND, orthopnea  GI: No abdominal pain, diarrhea, constipation, nausea, vomiting, melena, hematochezia  : No increased frequency, dysuria, hematuria, nocturia  MSK: No joint pain/swelling; no back pain; + edema  Neuro: No dizziness/lightheadedness, weakness, seizures, ++ numbness, tingling  Heme: No easy bruising or bleeding  Endo: No heat/  + cold intolerance  Psych: +++ significant nervousness, anxiety, stress, depression    All other systems were reviewed and are negative, except as noted.    VITALS/PHYSICAL EXAM  --------------------------------------------------------------------------------  T(C): 36.7 (22 05:48), Max: 37.1 (22 16:44)  HR: 79 (22 05:48) (59 - 82)  BP: 132/70 (22 05:48) (115/52 - 149/73)  RR: 18 (22:48) (18 - 20)  SpO2: 98% (22 05:48) (86% - 98%)  Height (cm): 165.1 (22:44)  Weight (kg): 68 (22:44)  BMI (kg/m2): 24.9 (22:44)  BSA (m2): 1.75 (22:44)      Physical Exam:  	Gen: NAD, well-appearing, Pale,   	HEENT: PERRL, supple neck, clear oropharynx  	Pulm: CTA B/L  	CV: RRR, S1S2; no rub  	Back: No spinal or CVA tenderness; no sacral edema  	Abd: +BS, soft, nontender/nondistended  	: No suprapubic tenderness  	UE: Warm, FROM, no clubbing, intact strength; no edema; no asterixis  	LE: Warm, FROM, no clubbing, intact strength; no edema  	Neuro: No focal deficits, intact gait  	Psych: Normal affect and mood  	Skin: Warm, without rashes  	Vascular access: AVF    LABS/STUDIES  --------------------------------------------------------------------------------              9.3    6.58  >-----------<  165      [22 @ 02:57]              30.9     137  |  93  |  54.5  ----------------------------<  126      [04-07-22 @ 08:57]  4.0   |  25.0  |  4.90        Ca     9.0     [22 @ 08:57]      Mg     2.0     [22 08:57]      Phos  4.7     [22 08:57]    TPro  6.9  /  Alb  4.0  /  TBili  0.6  /  DBili  x   /  AST  29  /  ALT  23  /  AlkPhos  188  [22 @ 18:21]    PT/INR: PT 14.5 , INR 1.25       [22 @ 18:21]  PTT: 66.6       [22 @ 02:57]    Creatinine Trend:  SCr 4.90 [ 08:57]  SCr 4.55 [ 18:21]    Urinalysis - [21 @ 07:54]      Color Yellow / Appearance Clear / SG 1.010 / pH 8.0      Gluc Negative / Ketone Negative  / Bili Negative / Urobili Negative       Blood Large / Protein 100 / Leuk Est Negative / Nitrite Negative      RBC 3-5 / WBC 3-5 / Hyaline  / Gran  / Sq Epi  / Non Sq Epi Negative / Bacteria Negative      Iron 43, TIBC 249, %sat 17      [21 @ 16:07]  Ferritin 498      [21 @ 16:07]  HbA1c 4.9      [18 @ 05:54]  Lipid: chol 126, , HDL 60, LDL --      [21 @ 06:07]    HBsAb 78.7      [21 @ 16:45]  HBsAb Nonreact      [21 @ 11:47]  HBsAg Nonreact      [21 @ 11:47]  HBcAb Nonreact      [20 @ 02:07]  HCV 0.13, Nonreact      [21 @ 11:47]    MEDICATIONS  (STANDING):    aspirin  chewable 81 milliGRAM(s) Oral daily  atorvastatin 80 milliGRAM(s) Oral at bedtime  DULoxetine 60 milliGRAM(s) Oral daily  heparin  Infusion.  Unit(s)/Hr (12 mL/Hr) IV Continuous <Continuous>  isosorbide   mononitrate ER Tablet (IMDUR) 30 milliGRAM(s) Oral daily  melatonin 1 milliGRAM(s) Oral at bedtime  multivitamin 1 Tablet(s) Oral daily  NIFEdipine XL 90 milliGRAM(s) Oral daily  NIFEdipine XL 60 milliGRAM(s) Oral <User Schedule>  pantoprazole    Tablet 40 milliGRAM(s) Oral before breakfast  sevelamer carbonate 800 milliGRAM(s) Oral three times a day  torsemide 20 milliGRAM(s) Oral daily    MEDICATIONS  (PRN):    acetaminophen     Tablet .. 650 milliGRAM(s) Oral every 6 hours PRN Temp greater or equal to 38C (100.4F), Mild Pain (1 - 3)  aluminum hydroxide/magnesium hydroxide/simethicone Suspension 30 milliLiter(s) Oral every 8 hours PRN Dyspepsia  HYDROmorphone  Injectable 0.5 milliGRAM(s) IV Push every 4 hours PRN Severe Pain (7 - 10)/break thru pain  ondansetron Injectable 4 milliGRAM(s) IV Push every 6 hours PRN Nausea  oxyCODONE    IR 5 milliGRAM(s) Oral every 4 hours PRN Moderate Pain (4 - 6)  oxyCODONE    IR 10 milliGRAM(s) Oral every 4 hours PRN Severe Pain (7 - 10)      Vital Signs Last 24 Hrs,    T(C): 36.7 (2022 05:48), Max: 37.1 (2022 16:44)  T(F): 98 (2022 05:48), Max: 98.8 (2022 16:44)  HR: 79 (2022 05:48) (59 - 82)  BP: 132/70 (2022 05:48) (115/52 - 149/73)  RR: 18 (2022 05:48) (18 - 20)  SpO2: 98% (2022 05:48) (86% - 98%)    Constitutional: alert, no distress   Eyes: no scleral icterus, left eye with some mucous crusting   ENMT: atraumatic   Neck: mild JVD   Respiratory: non labored   Cardiovascular: sinus rhythm on monitor   Gastrointestinal: soft, non tender, no pulsatile mass   Genitourinary: no [place]   Rectal: not examined   Extremities: pitting edema to the bilateral lower ext, Left foot cooler than right from distal tibial region to the foot.  Left upper ext with AVF, good thrill and outflow venous distention  Vascular: palpable radials,  Femorals are palpable but unequal (Right > Left),  no papable pedals. Doppler L popliteal and no signals to DP/PT/AT.  Neurological: decreased motor function to the left foot, minimal ablity to dorsi/plantar flex at the ankle, unable to flex toes.  Gross sensation present   Psychiatric: good affect    I & O's Detail    LABS:                        9.3    6.58  )-----------( 165      ( 2022 02:57 )             30.9     04-06    138  |  94<L>  |  52.7<H>  ----------------------------<  154<H>  4.0   |  26.0  |  4.55<H>    Ca    9.5      2022 18:21    TPro  6.9  /  Alb  4.0  /  TBili  0.6  /  DBili  x   /  AST  29  /  ALT  23  /  AlkPhos  188<H>  04-06    PT/INR - ( 2022 18:21 )   PT: 14.5 sec;   INR: 1.25 ratio      PTT - ( 2022 02:57 )  PTT:66.6 sec    RADIOLOGY & ADDITIONAL STUDIES:    TTE Echo Complete w/o Contrast w/ Doppler (21 @ 17:39)     EXAM:  ECHO TTE WO CON COMP W DOPP      PROCEDURE DATE:  2021     INTERPRETATION:  TRANSTHORACIC ECHOCARDIOGRAM REPORT    Patient Name:   CHRISTIANO ELIZABETH Patient Location: Inpatient  Medical Rec #:  [id number]    Accession #:      [id number]  Account #:                    Height:           65.0 in 165.0 cm  YOB: 1934     Weight:           158.7 lb 72.01 kg  Patient Age:    87 years      BSA:              1.79 m²  Patient Gender: M             BP:               140/96mmHg    Date of Exam:        2021 5:39:04 PM  Sonographer:         Cl Pemberton  Referring Physician: Raul Naylor MD    Procedure:   2D Echo/Doppler/Color Doppler Complete.  Indications: Dyspnea, unspecified - R06.00  Diagnosis:   Dyspnea, unspecified - R06.00    2D AND M-MODE MEASUREMENTS (normal ranges within parentheses):  Left                 Normal   Aorta/Left            Normal  Ventricle:                    Atrium:  IVSd (2D):    1.24  (0.7-1.1) Left Atrium    4.11  (1.9-4.0)                cm             (2D):           cm  LVPWd (2D):   1.22  (0.7-1.1) LA Volume      44.6                 cm             Index         ml/m²  LVIDd (2D):   3.91  (3.4-5.7)                 cm  LVIDs (2D):   2.48                 cm  LV FS (2D):   36.6   (>25%)                  %  Relative Wall 0.62   (<0.42)  Thickness    LV SYSTOLIC FUNCTION BY 2D PLANIMETRY (MOD):  EF-A4C View: 55.1 % EF-A2C View: 62.2 % EF-Biplane: 60 %    LV DIASTOLIC FUNCTION:  MV Peak E: 2.55 m/s e', MV Jayshree: 0.05 m/s  MV Peak A: 1.95 m/s E/e' Ratio: 55.29  E/A Ratio: 1.31     Decel Time: 401 msec    SPECTRAL DOPPLER ANALYSIS (where applicable):  Mitral Valve:  MV Mean Grad: 12.0 mmHg MV P1/2 Time: 116.23 msec                          MV Area, PHT: 1.89 cm²    Aortic Valve: AoV Max Chuy: 2.05 m/s AoV Peak P.9 mmHg AoV Mean P.1 mmHg    LVOT Vmax: 0.94 m/s LVOT VTI: 0.156 m LVOT Diameter: 1.96 cm    AoV Area, Vmax: 1.38 cm² AoV Area, VTI: 1.11 cm² AoV Area, Vmn: 1.19 cm²  Ao VTI: 0.423  Tricuspid Valve and PA/RV Systolic Pressure: TR Max Velocity: 4.64 m/s RA Pressure: 15 mmHg RVSP/PASP: 101.1 mmHg    PHYSICIAN INTERPRETATION:  Left Ventricle: The left ventricular internal cavity size is normal.  Global LV systolic function was normal. Left ventricular ejection fraction, by visual estimation, is 60 to 65%. The interventricular septum is flattened in systole, consistent with right ventricular pressure overload. Spectral Doppler shows pseudonormal pattern of left ventricular myocardial filling (Grade II diastolic dysfunction). Elevated mean left atrial pressure.  Right Ventricle: The right ventricular size is mildly enlarged. RV systolic function is normal.  Left Atrium: Moderate to severe left atrial enlargement.  Right Atrium: Moderately enlarged right atrium.  Pericardium: There is no evidence of pericardial effusion.  Mitral Valve: Moderate thickening and calcification of the posterior mitral valve leaflet. There is moderate to severe mitral annular calcification. Mitral valve mean gradient is 12.0 mmHg consistent with moderate mitral stenosis. Moderate mitral valve regurgitation is seen.  Tricuspid Valve: The tricuspid valve is normal in structure. Moderate tricuspid regurgitation is visualized. Estimated pulmonary artery systolic pressure is 101.1 mmHg assuming a right atrial pressure of 15 mmHg, which is consistent with severe pulmonary hypertension.  Aortic Valve: Peak aortic valve gradient is 16.9 mmHg and the mean gradient is 9.1 mmHg, which is probably normal in the setting of a prosthetic aortic valve. Trivial aortic valve regurgitation is seen. Patient is s/p TAVR which appears to be functioning normally.  Pulmonic Valve: Mild pulmonic valve regurgitation.  Aorta: The aortic root is normal in size and structure.  Venous: The inferior vena cava was dilated, with respiratory size variation less than 50%.    Summary:   1. Left ventricular ejection fraction, by visual estimation, is 60 to 65%.   2. Normal global left ventricular systolic function.   3. Spectral Doppler shows pseudo normal pattern of left ventricular myocardial filling (Grade II diastolic dysfunction). Elevated mean left atrial pressure.   4. Normal left ventricular internal cavity size.   5. Moderate to severe left atrial enlargement.   6. There is mild concentric left ventricular hypertrophy.   7. Mildly enlarged right ventricle. RV systolic function is normal.   8. Moderately enlarged right atrium.   9. Moderate to severe mitral annular calcification.  10. Moderate thickening and calcification of the posterior mitral valve leaflet.  11. Mitral valve mean gradient is 12.0 mmHg consistent with moderate mitral stenosis.  12. Moderate mitral valve regurgitation.  13. Patient is S/P  TAVR which appears to be functioning normally.  14. Moderate tricuspid regurgitation.  15. Mild pulmonic valve regurgitation.  16. Estimated pulmonary artery systolic pressure is 101.1 mmHg assuming a right atrial pressure of 15 mmHg, which is consistent with severe pulmonary hypertension.  17. There is no evidence of pericardial effusion.    Jeanette Chua MD Electronically signed on 2021 at 9:44:43 AM    JEANETTE CHUA MD; Attending Cardiologist  This document has been electronically signed. 2021  5:39PM    EGD (. @ 12:22)   EGD Report Date: 2021 12:22 PM     Patient Name: CHRISTIANO ELIZABETH     MRN: [id number]     Account Number: [id number]    Gender: Male      (age): 1934 (87)     Instrument(s): GIF  [id number])([id number])    Attending/Fellow:     Evangelist Giron MD     History of Present Illness:     The patient is having EGD for anemia. No complaints today. History of ESRD,    gastric avms and colon diverticulosis.    Administered Medications: As per Anesthesiology Record     Indications: Anemia: 285.9 - D64.9    Procedure:     The procedure, indications, preparation and potential complications were    explained to the patient, who indicated understanding and signed the    corresponding consent forms. MAC was administered by anesthesiologist.    Continuous pulse oximetry and blood pressure monitoring were used throughout the    procedure. Supplemental oxygen was used. Patient was placed in the left lateral    decubitus position. The endoscope was introduced through the mouth and advanced    under direct visualization until the second part of the duodenum was reached.    Patient tolerance to the procedure was good. The procedure was not difficult.    Blood loss was minimal. Specimen:Gastric antrum    Limitations/Complications: There were no apparent limitations or complications    Findings:     Esophagus Mucosa Normal mucosa was noted in the whole esophagus.     Stomach Mucosa Localized erosions and ulceration of the mucosa was noted in the    pre-pyloric region. These findings are compatible with erosive gastritis.     Additional stomach findings Otherwise normal stomach..     Duodenum Mucosa Normal mucosa was noted in the whole examined duodenum.     Impressions:      Normal mucosa in the whole esophagus.      Erosions and ulceration in the pre-pyloric region compatible with erosive    gastritis.      Normal mucosa in the whole examined duodenum.      Otherwise normal stomach. .       1) ESRD on HD- TS schedule  2)  Anemia of CKD  3) HTN/ LE edema  4) CKD MBD    88 yo M with rest pain, Ischemic left lower extremity, chronic, Secondary to failed distal bypass graft.     - On Heparin   - Vein mapping  - Angiogram in AM,     CTA w. Run -off,

## 2022-04-07 NOTE — CHART NOTE - NSCHARTNOTEFT_GEN_A_CORE
Called for consult clearance on patient.  Patient follows with [facility].  [facility], they will call [facility].

## 2022-04-08 NOTE — PROCEDURE NOTE - ADDITIONAL PROCEDURE DETAILS
Changed A3 Window End time from 750ms to 700ms. Min Auto A3 Window End time changed from 600ms to 650ms.   Patient planned for vascular OR procedure on Monday  For the duration of the procedure the Micra AV pacemaker's output should be increased to 5V @ 1.0ms to ensure consistent RV capture  Following the procedure the device output can be reduced to chronic outputs  Please call EP or [employer] prior to surgery so the above changes can be implemented.

## 2022-04-08 NOTE — PROGRESS NOTE ADULT - ASSESSMENT
86 yo M with rest pain, Ischemic left lower extremity, chronic. Secondary to failed distal bypass graft.     - Continue heparin ggt at a low dose with no bolus after labs are obtained (History of Gi Bleed)  - pain control, avoiding narcotic medications  - Cards consulted: high risk for high risk procedure; TTE, 72hr tele monitor post-op  - Plan for bypass next week

## 2022-04-08 NOTE — PROCEDURE NOTE - NSINTZONE1_CARD_ALL_CORE
----- Message from Jero Gu sent at 1/15/2018  8:43 AM CST -----  Contact: Pt  x_  1st Request  _  2nd Request  _  3rd Request        Who: YARI MUELLER [[id number]]    Why: Requesting a call back in regards to discussing coming in today for abdominal pain. Pt is pregnant and states that pain has been all weekend rated an 8 on scale of 1-10.   Please return the call at earliest convenience to discuss further. Thanks!    What Number to Call Back:[phone]    When to Expect a call back: (Within 24 hours)                                 No

## 2022-04-08 NOTE — PROGRESS NOTE ADULT - SUBJECTIVE AND OBJECTIVE BOX
[facility] DIVISION OF KIDNEY DISEASES AND HYPERTENSION -- HEMODIALYSIS NOTE  --------------------------------------------------------------------------------  Chief Complaint: ESRD/Ongoing hemodialysis requirement    24 hour events/subjective:  s/p HD yesterday- tolerated 2.5 kg  pt seen and examined; c/o leg pain        PAST HISTORY  --------------------------------------------------------------------------------  No significant changes to PMH, PSH, FHx, SHx, unless otherwise noted    ALLERGIES & MEDICATIONS  --------------------------------------------------------------------------------  Allergies  Plavix (Hives)  Toprol-XL (Rash)    Standing Inpatient Medications  aspirin  chewable 81 milliGRAM(s) Oral daily  atorvastatin 80 milliGRAM(s) Oral at bedtime  DULoxetine 60 milliGRAM(s) Oral daily  heparin  Infusion.  Unit(s)/Hr IV Continuous <Continuous>  isosorbide   mononitrate ER Tablet (IMDUR) 30 milliGRAM(s) Oral daily  melatonin 3 milliGRAM(s) Oral at bedtime  multivitamin 1 Tablet(s) Oral daily  NIFEdipine XL 90 milliGRAM(s) Oral daily  NIFEdipine XL 60 milliGRAM(s) Oral <User Schedule>  pantoprazole    Tablet 40 milliGRAM(s) Oral before breakfast  sevelamer carbonate 800 milliGRAM(s) Oral three times a day  torsemide 20 milliGRAM(s) Oral daily    PRN Inpatient Medications  acetaminophen     Tablet .. 975 milliGRAM(s) Oral every 6 hours PRN  acetaminophen   IVPB .. 1000 milliGRAM(s) IV Intermittent once PRN  aluminum hydroxide/magnesium hydroxide/simethicone Suspension 30 milliLiter(s) Oral every 8 hours PRN  ondansetron Injectable 4 milliGRAM(s) IV Push every 6 hours PRN  oxyCODONE    IR 5 milliGRAM(s) Oral every 4 hours PRN  oxyCODONE    IR 10 milliGRAM(s) Oral every 4 hours PRN      REVIEW OF SYSTEMS  --------------------------------------------------------------------------------  Gen: No weight changes, fatigue, fevers/chills, weakness  Skin: No rashes  Head/Eyes/Ears/Mouth: No headache  Respiratory: No dyspnea, cough,  CV: No chest pain, orthopnea  GI: No abdominal pain, diarrhea, constipation, nausea, vomiting,  MSK: No joint pain  Neuro: No dizziness/lightheadedness, weakness  Heme: No bleeding  Psych: No significant nervousness, anxiety, stress, depression    All other systems were reviewed and are negative, except as noted.    VITALS/PHYSICAL EXAM  --------------------------------------------------------------------------------  T(C): 36.8 (04-08-22 @ 06:39), Max: 36.9 (04-07-22 @ 21:15)  HR: 69 (04-08-22 @ 06:39) (64 - 93)  BP: 130/68 (04-08-22 @ 06:39) (118/41 - 167/77)  RR: 18 (04-08-22 @ 06:39) (18 - 20)  SpO2: 88% (04-08-22 @ 06:39) (88% - 96%)  Wt(kg): --  Height (cm): 165.1 (04-06-22 @ 16:44)  Weight (kg): 68 (04-06-22 @ 16:44)  BMI (kg/m2): 24.9 (04-06-22 @ 16:44)  BSA (m2): 1.75 (04-06-22 @ 16:44)      04-07-22 @ 07:01  -  04-08-22 @ 07:00  --------------------------------------------------------  IN: 480 mL / OUT: 2500 mL / NET: -2020 mL      Physical Exam:  	Gen: NAD  	HEENT: PERRL, supple neck,  	Pulm: CTA B/L  	CV: RRR, S1S2; no rub  	Abd: +BS, soft, nontender  	UE: Warm, intact strength; no asterixis  	LE: Warm, leg in bandage  	Neuro: No focal deficits  	Psych: Normal affect and mood  	Skin: Warm, without rashes  	Vascular access: AVF    LABS/STUDIES  --------------------------------------------------------------------------------              9.5    7.02  >-----------<  175      [04-08-22 @ 06:37]              31.4     138  |  97  |  31.8  ----------------------------<  100      [04-08-22 @ 06:37]  4.0   |  27.0  |  3.35        Ca     9.2     [04-08-22 @ 06:37]      Mg     1.9     [04-08-22 @ 06:37]      Phos  3.2     [04-08-22 @ 06:37]    TPro  6.9  /  Alb  4.0  /  TBili  0.6  /  DBili  x   /  AST  29  /  ALT  23  /  AlkPhos  188  [04-06-22 @ 18:21]    PT/INR: PT 14.5 , INR 1.25       [04-06-22 @ 18:21]  PTT: 60.6       [04-08-22 @ 06:37]      Iron 43, TIBC 249, %sat 17      [07-13-21 @ 16:07]  Ferritin 498      [07-13-21 @ 16:07]  HbA1c 4.9      [08-09-18 @ 05:54]  Lipid: chol 126, , HDL 60, LDL --      [06-03-21 @ 06:07]

## 2022-04-08 NOTE — PROGRESS NOTE ADULT - ASSESSMENT
1) ESRD on HD  2)Ischemic left lower extremity, chronic- failed distal bypass graft  3) anemia of CKD    Plan for angiogram today and OR on monday for possible bypass  HD tomorrow  UF as tolerated  Hb low- AMY with HD

## 2022-04-08 NOTE — PROGRESS NOTE ADULT - SUBJECTIVE AND OBJECTIVE BOX
VASCULAR SURGERY PROGRESS NOTE    Subjective: Patient examined at bedside this AM. Reports pain in LLE. Lethargic during day 2/2 pain medications. Overnight had increased leg pain and hyperactive agitation. Received 1x 5mg haldol.    Objective:  Vital Signs  T(C): 36.8 (04-08 @ 06:39), Max: 36.9 (04-07 @ 21:15)  HR: 69 (04-08 @ 06:39) (64 - 93)  BP: 130/68 (04-08 @ 06:39) (118/41 - 167/77)  RR: 18 (04-08 @ 06:39) (18 - 20)  SpO2: 88% (04-08 @ 06:39) (88% - 96%)      Physical Exam:  General: alert and calm, NAD  Resp: airway patent, respirations unlabored  Extremities: LLE hyperemic appearing, warm and tender to touch. Pitting edema to the bilateral lower ext, Left foot cooler than right from distal tibial region to the foot.  Left upper ext with AVF, good thrill and outflow venous distention  Vascular: palpable radials,  Femorals are palpable but unequal (Right > Left),  no papable pedals. Doppler L popliteal and no signals to DP/PT/AT.    Labs:                        9.5    7.02  )-----------( 175      ( 08 Apr 2022 06:37 )             31.4   04-08    138  |  97<L>  |  31.8<H>  ----------------------------<  100<H>  4.0   |  27.0  |  3.35<H>    Ca    9.2      08 Apr 2022 06:37  Phos  3.2     04-08  Mg     1.9     04-08    TPro  6.9  /  Alb  4.0  /  TBili  0.6  /  DBili  x   /  AST  29  /  ALT  23  /  AlkPhos  188<H>  04-06    CAPILLARY BLOOD GLUCOSE          Medications:   MEDICATIONS  (STANDING):  aspirin  chewable 81 milliGRAM(s) Oral daily  atorvastatin 80 milliGRAM(s) Oral at bedtime  DULoxetine 60 milliGRAM(s) Oral daily  heparin  Infusion.  Unit(s)/Hr (12 mL/Hr) IV Continuous <Continuous>  isosorbide   mononitrate ER Tablet (IMDUR) 30 milliGRAM(s) Oral daily  melatonin 3 milliGRAM(s) Oral at bedtime  multivitamin 1 Tablet(s) Oral daily  NIFEdipine XL 90 milliGRAM(s) Oral daily  NIFEdipine XL 60 milliGRAM(s) Oral <User Schedule>  pantoprazole    Tablet 40 milliGRAM(s) Oral before breakfast  sevelamer carbonate 800 milliGRAM(s) Oral three times a day  torsemide 20 milliGRAM(s) Oral daily    MEDICATIONS  (PRN):  acetaminophen     Tablet .. 650 milliGRAM(s) Oral every 6 hours PRN Temp greater or equal to 38C (100.4F), Mild Pain (1 - 3)  aluminum hydroxide/magnesium hydroxide/simethicone Suspension 30 milliLiter(s) Oral every 8 hours PRN Dyspepsia  HYDROmorphone  Injectable 0.5 milliGRAM(s) IV Push every 4 hours PRN Severe Pain (7 - 10)/break thru pain  ondansetron Injectable 4 milliGRAM(s) IV Push every 6 hours PRN Nausea  oxyCODONE    IR 5 milliGRAM(s) Oral every 4 hours PRN Moderate Pain (4 - 6)  oxyCODONE    IR 10 milliGRAM(s) Oral every 4 hours PRN Severe Pain (7 - 10)

## 2022-04-09 NOTE — PROGRESS NOTE ADULT - ASSESSMENT
· Assessment	  86 yo M with rest pain, Ischemic left lower extremity, chronic. Secondary to failed distal bypass graft.     - Continue heparin ggt at a low dose with no bolus after labs are obtained (History of Gi Bleed)  - pain control, avoiding narcotic medications  - Cards consulted: high risk for high risk procedure; TTE, 72hr tele monitor post-op  - Plan for bypass next week

## 2022-04-09 NOTE — PROGRESS NOTE ADULT - SUBJECTIVE AND OBJECTIVE BOX
[facility] - [facility]                                   [address] [place] [address]                                                      PHONE: [phone]                                                         FAX: [phone]  [email or web address]  ---------------------------------------------------------------------------------------------------------------------------------    Overnight events/patient complaints:  Patient feeling well.  No chest pain or dyspnea.  LLE painful.     PAST MEDICAL & SURGICAL HISTORY:  DM (diabetes mellitus)  - resolved    AV block, 1st degree    Arrhythmia    CAD (coronary artery disease)    HTN (hypertension)    VT (ventricular tachycardia)    RICHARDS (dyspnea on exertion)    Anemia    Risk factors for obstructive sleep apnea    ESRD on dialysis  HD on Mondays and Fridays    Aortic stenosis  - s/p valve replacement    GI bleeding  due to ulcerated polyps and colonic AVMs    H/O circumcision  at  age  65    H/O left knee surgery    H/O angioplasty  2013,  no  intervention    A-V fistula  left arm 5/2017    H/O carotid endarterectomy  Right    S/P TAVR (transcatheter aortic valve replacement)    History of loop recorder        Plavix (Hives)  Toprol-XL (Rash)    MEDICATIONS  (STANDING):  aspirin  chewable 81 milliGRAM(s) Oral daily  atorvastatin 80 milliGRAM(s) Oral at bedtime  DULoxetine 60 milliGRAM(s) Oral daily  epoetin juan-epbx (RETACRIT) Injectable 72855 Unit(s) IV Push <User Schedule>  heparin  Infusion.  Unit(s)/Hr (12 mL/Hr) IV Continuous <Continuous>  isosorbide   mononitrate ER Tablet (IMDUR) 30 milliGRAM(s) Oral daily  melatonin 3 milliGRAM(s) Oral at bedtime  multivitamin 1 Tablet(s) Oral daily  NIFEdipine XL 90 milliGRAM(s) Oral daily  NIFEdipine XL 60 milliGRAM(s) Oral <User Schedule>  pantoprazole    Tablet 40 milliGRAM(s) Oral before breakfast  sevelamer carbonate 800 milliGRAM(s) Oral three times a day  torsemide 20 milliGRAM(s) Oral daily    MEDICATIONS  (PRN):  acetaminophen     Tablet .. 975 milliGRAM(s) Oral every 6 hours PRN Mild Pain (1 - 3)  acetaminophen   IVPB .. 1000 milliGRAM(s) IV Intermittent once PRN Moderate Pain (4 - 6)  aluminum hydroxide/magnesium hydroxide/simethicone Suspension 30 milliLiter(s) Oral every 8 hours PRN Dyspepsia  ondansetron Injectable 4 milliGRAM(s) IV Push every 6 hours PRN Nausea  oxyCODONE    IR 5 milliGRAM(s) Oral every 4 hours PRN Moderate Pain (4 - 6)  oxyCODONE    IR 10 milliGRAM(s) Oral every 4 hours PRN Severe Pain (7 - 10)      Vital Signs Last 24 Hrs  T(C): 36.7 (09 Apr 2022 08:45), Max: 36.9 (08 Apr 2022 16:30)  T(F): 98 (09 Apr 2022 08:45), Max: 98.4 (08 Apr 2022 16:30)  HR: 73 (09 Apr 2022 08:45) (56 - 73)  BP: 150/67 (09 Apr 2022 08:45) (113/48 - 169/62)  BP(mean): --  RR: 18 (09 Apr 2022 08:45) (17 - 19)  SpO2: 94% (09 Apr 2022 08:45) (88% - 95%)  ICU Vital Signs Last 24 Hrs  CHRISTIANO ELIZABETH  I&O's Detail    08 Apr 2022 07:01  -  09 Apr 2022 07:00  --------------------------------------------------------  IN:    Oral Fluid: 240 mL  Total IN: 240 mL    OUT:  Total OUT: 0 mL    Total NET: 240 mL        I&O's Summary    08 Apr 2022 07:01  -  09 Apr 2022 07:00  --------------------------------------------------------  IN: 240 mL / OUT: 0 mL / NET: 240 mL      Drug Dosing Weight  CHRISTIANO ELIZABETH      PHYSICAL EXAM:  General: Appears well developed, alert and cooperative.  HEENT: Head; normocephalic, atraumatic.  Eyes: Pupils reactive, cornea wnl.  Neck: Supple, no nodes adenopathy, no JVD, no carotid bruit  CARDIOVASCULAR: Normal S1 and S2, No murmur, rub, gallop or lift.   LUNGS: No rales, rhonchi or wheeze. Normal breath sounds bilaterally.  ABDOMEN: Soft, nontender, nondistended  EXTREMITIES: Cyanotic L foot, pulses absent   SKIN: warm and dry with normal turgor.  NEURO: Alert/oriented x 3/normal motor exam.   PSYCH: normal affect.        LABS:                        9.8    7.20  )-----------( 214      ( 09 Apr 2022 06:29 )             32.2     04-08    138  |  97<L>  |  31.8<H>  ----------------------------<  100<H>  4.0   |  27.0  |  3.35<H>    Ca    9.2      08 Apr 2022 06:37  Phos  3.2     04-08  Mg     1.9     04-08      CHRISTIANO ELIZABETH      PTT - ( 09 Apr 2022 06:29 )  PTT:65.9 sec      RADIOLOGY & ADDITIONAL STUDIES:    INTERPRETATION OF TELEMETRY (personally reviewed): v paced    ECG:    ECHO:  ECHO: < from: TTE Echo Complete w/o Contrast w/ Doppler (11.23.21 @ 17:39) >  Summary:   1. Left ventricular ejection fraction, by visual estimation, is 60 to 65%.   2. Normal global left ventricular systolic function.   3. Spectral Doppler shows pseudonormal pattern of left ventricular myocardial filling (Grade II diastolic dysfunction). Elevated mean left atrial pressure.   4. Normal left ventricular internal cavity size.   5. Moderate to severe left atrial enlargement.   6. There is mild concentric left ventricular hypertrophy.   7. Mildly enlarged right ventricle. RV systolic function is normal.   8. Moderately enlarged right atrium.   9. Moderate to severe mitral annular calcification.  10. Moderate thickening and calcification of the posterior mitral valve leaflet.  11. Mitral valve mean gradient is 12.0 mmHg consistent with moderate mitral stenosis.  12. Moderate mitral valve regurgitation.  13. Patient is s/p TAVR which appears to be functioning normally.  14. Moderate tricuspid regurgitation.  15. Mild pulmonic valve regurgitation.  16. Estimated pulmonary artery systolic pressure is 101.1 mmHg assuming a right atrial pressure of 15 mmHg, which is consistent with severe pulmonary hypertension.  17. There is no evidence of pericardial effusion.  STRESS TEST:    Summary:   1. Left ventricular ejection fraction, by visual estimation, is 60 to   65%.   2. Normal global left ventricular systolic function.  3. Moderate to severe left atrial enlargement.   4. Abnormal septal motion consistent with post-operative status.   5. Mildly increased LV wall thickness.   6. Mild to moderately enlarged right atrium.   7. Moderate mitral valve regurgitation.   8. Thickening and calcification of the anterior and posterior mitral   valve leaflets.   9. Transmitral mean gradient is 13.4 mmHg at HR 89 bpm.  10. Mild-moderate tricuspid regurgitation.  11. 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 Gradients suggestive of normally   functioning prosthetic valve. No significant change from prior study   dated 11/2021.  12. Estimated pulmonary artery systolic pressure is 66.4 mmHg assuming a   right atrial pressure of 3 mmHg, which is consistent with severe   pulmonary hypertension.  13. Compared to prior TTE study dated 11/2021, no significant interval   changes have occurred.      CARDIAC CATHETERIZATION:    ASSESSMENT AND PLAN:  In summary, CHRISTIANO ELIZABETH is an 87y Male with past medical history significant for severe AS s/p TAVR, PPM, ESRD on HD, LLE bypass p/w LLE pain found to have occluded graft.    Preoperative risk assessment- patient is a moderate risk for a high risk procedure such as LLE bypass.  He has no active cardiac contraindications.  Would not pursue further cardiovascular testing as it will not change out management.  Would proceed to angiogram, PCI or LLE bypass due to urgent nature of his ischemic limb.  Continue asa and atorvastatin.    ESRD on HD- can consider stopping torsemide    Thank you for allowing [facility] [place] to participate in the care of this patient.  Please feel free to call with any questions or concerns.

## 2022-04-09 NOTE — PROGRESS NOTE ADULT - SUBJECTIVE AND OBJECTIVE BOX
Subjective:    Patient examined at bedside this AM. Reports pain in LLE. No acute events overnight. remains afebrile. HDN stable.    MEDICATIONS  (STANDING):  aspirin  chewable 81 milliGRAM(s) Oral daily  atorvastatin 80 milliGRAM(s) Oral at bedtime  DULoxetine 60 milliGRAM(s) Oral daily  epoetin juan-epbx (RETACRIT) Injectable 83028 Unit(s) IV Push <User Schedule>  heparin  Infusion.  Unit(s)/Hr (12 mL/Hr) IV Continuous <Continuous>  isosorbide   mononitrate ER Tablet (IMDUR) 30 milliGRAM(s) Oral daily  melatonin 3 milliGRAM(s) Oral at bedtime  multivitamin 1 Tablet(s) Oral daily  NIFEdipine XL 90 milliGRAM(s) Oral daily  NIFEdipine XL 60 milliGRAM(s) Oral <User Schedule>  pantoprazole    Tablet 40 milliGRAM(s) Oral before breakfast  sevelamer carbonate 800 milliGRAM(s) Oral three times a day  torsemide 20 milliGRAM(s) Oral daily    MEDICATIONS  (PRN):  acetaminophen     Tablet .. 975 milliGRAM(s) Oral every 6 hours PRN Mild Pain (1 - 3)  acetaminophen   IVPB .. 1000 milliGRAM(s) IV Intermittent once PRN Moderate Pain (4 - 6)  aluminum hydroxide/magnesium hydroxide/simethicone Suspension 30 milliLiter(s) Oral every 8 hours PRN Dyspepsia  ondansetron Injectable 4 milliGRAM(s) IV Push every 6 hours PRN Nausea  oxyCODONE    IR 5 milliGRAM(s) Oral every 4 hours PRN Moderate Pain (4 - 6)  oxyCODONE    IR 10 milliGRAM(s) Oral every 4 hours PRN Severe Pain (7 - 10)      Vital Signs Last 24 Hrs  T(C): 36.9 (08 Apr 2022 16:30), Max: 36.9 (08 Apr 2022 16:30)  T(F): 98.4 (08 Apr 2022 16:30), Max: 98.4 (08 Apr 2022 16:30)  HR: 71 (09 Apr 2022 05:30) (56 - 71)  BP: 169/62 (09 Apr 2022 05:30) (113/48 - 169/62)  BP(mean): --  RR: 18 (09 Apr 2022 05:30) (17 - 19)  SpO2: 88% (09 Apr 2022 05:30) (88% - 95%)      04-08  -  04-09  --------------------------------------------------------  IN:    Oral Fluid: 240 mL  Total IN: 240 mL    OUT:  Total OUT: 0 mL    Total NET: 240 mL          Physical Exam:    Physical Exam:  General: alert and calm, NAD  Resp: airway patent, respirations unlabored  Extremities: LLE hyperemic appearing, warm and tender to touch. Pitting edema to the bilateral lower ext, Left foot cooler than right from distal tibial region to the foot.  Left upper ext with AVF, good thrill and outflow venous distention  Vascular: palpable radials,  Femorals are palpable but unequal (Right > Left),  no papable pedals. Doppler L popliteal and no signals to DP/PT/AT.      LABS:                        9.8    7.20  )-----------( 214      ( 09 Apr 2022 06:29 )             32.2     04-08    138  |  97<L>  |  31.8<H>  ----------------------------<  100<H>  4.0   |  27.0  |  3.35<H>    Ca    9.2      08 Apr 2022 06:37  Phos  3.2     04-08  Mg     1.9     04-08      PTT - ( 09 Apr 2022 06:29 )  PTT:65.9 sec

## 2022-04-09 NOTE — PROGRESS NOTE ADULT - SUBJECTIVE AND OBJECTIVE BOX
Vital Signs Last 24 Hrs  T(C): 36.7 (09 Apr 2022 12:32), Max: 36.9 (08 Apr 2022 16:30)  T(F): 98.1 (09 Apr 2022 12:32), Max: 98.4 (08 Apr 2022 16:30)  HR: 71 (09 Apr 2022 12:32) (56 - 73)  BP: 107/49 (09 Apr 2022 12:32) (107/49 - 169/62)  BP(mean): --  ABP: --  ABP(mean): --  RR: 18 (09 Apr 2022 12:32) (17 - 18)  SpO2: 97% (09 Apr 2022 12:32) (88% - 97%)  HD planned for today. No symptoms. Hemodynamics stable. Tolerating dialysis and ultrafiltration.  Pre Laboratory values personally reviewed by me.  Dialysis adjusted appropriately based on current values.  Will continue the current medical management.  Next hemodialysis as scheduled.  Discussed with nursing, primary care team.

## 2022-04-10 NOTE — PROGRESS NOTE ADULT - SUBJECTIVE AND OBJECTIVE BOX
[facility] DIVISION OF KIDNEY DISEASES AND HYPERTENSION -- FOLLOW UP NOTE  --------------------------------------------------------------------------------  Chief Complaint:  ESRD HD  24 hour events/subjective:  Plan for angio tomorrow  May require HD post angio;      PAST HISTORY  --------------------------------------------------------------------------------  No significant changes to PMH, PSH, FHx, SHx, unless otherwise noted    ALLERGIES & MEDICATIONS  --------------------------------------------------------------------------------  Allergies    Plavix (Hives)  Toprol-XL (Rash)    Intolerances      Standing Inpatient Medications  aspirin  chewable 81 milliGRAM(s) Oral daily  atorvastatin 80 milliGRAM(s) Oral at bedtime  DULoxetine 60 milliGRAM(s) Oral daily  epoetin juan-epbx (RETACRIT) Injectable 65394 Unit(s) IV Push <User Schedule>  heparin  Infusion.  Unit(s)/Hr IV Continuous <Continuous>  isosorbide   mononitrate ER Tablet (IMDUR) 30 milliGRAM(s) Oral daily  melatonin 3 milliGRAM(s) Oral at bedtime  multivitamin 1 Tablet(s) Oral daily  NIFEdipine XL 90 milliGRAM(s) Oral daily  NIFEdipine XL 60 milliGRAM(s) Oral <User Schedule>  pantoprazole    Tablet 40 milliGRAM(s) Oral before breakfast  sevelamer carbonate 800 milliGRAM(s) Oral three times a day  torsemide 20 milliGRAM(s) Oral daily    PRN Inpatient Medications  acetaminophen     Tablet .. 975 milliGRAM(s) Oral every 6 hours PRN  aluminum hydroxide/magnesium hydroxide/simethicone Suspension 30 milliLiter(s) Oral every 8 hours PRN  ketorolac   Injectable 15 milliGRAM(s) IV Push every 8 hours PRN  ondansetron Injectable 4 milliGRAM(s) IV Push every 6 hours PRN  oxyCODONE    IR 5 milliGRAM(s) Oral every 4 hours PRN  oxyCODONE    IR 10 milliGRAM(s) Oral every 4 hours PRN      REVIEW OF SYSTEMS  --------------------------------------------------------------------------------  Gen: No weight changes, fatigue, fevers/chills, weakness  Skin: No rashes  Head/Eyes/Ears/Mouth: No headache; Normal hearing; Normal vision w/o blurriness; No sinus pain/discomfort, sore throat  Respiratory: No dyspnea, cough, wheezing, hemoptysis  CV: No chest pain, PND, orthopnea  GI: No abdominal pain, diarrhea, constipation, nausea, vomiting, melena, hematochezia  : No increased frequency, dysuria, hematuria, nocturia  MSK: No joint pain/swelling; no back pain; no edema  Neuro: No dizziness/lightheadedness, weakness, seizures, numbness, tingling  Heme: No easy bruising or bleeding  Endo: No heat/cold intolerance  Psych: No significant nervousness, anxiety, stress, depression    All other systems were reviewed and are negative, except as noted.    VITALS/PHYSICAL EXAM  --------------------------------------------------------------------------------  T(C): 36.7 (04-10-22 @ 07:53), Max: 36.7 (04-09-22 @ 16:10)  HR: 68 (04-10-22 @ 07:53) (58 - 73)  BP: 119/63 (04-10-22 @ 07:53) (119/63 - 143/63)  RR: 18 (04-10-22 @ 07:53) (18 - 19)  SpO2: 96% (04-10-22 @ 07:53) (94% - 98%)  Wt(kg): --        04-09-22 @ 07:01  -  04-10-22 @ 07:00  --------------------------------------------------------  IN: 492 mL / OUT: 2000 mL / NET: -1508 mL      Physical Exam:  	Gen: NAD, well-appearing, Pale,   	HEENT: PERRL, supple neck, clear oropharynx  	Pulm: CTA B/L  	CV: RRR, S1S2; no rub  	Back: No spinal or CVA tenderness; no sacral edema  	Abd: +BS, soft, nontender/nondistended  	Neuro: No focal deficits, intact gait  	Psych: Normal affect and mood  	Vascular access: AVF  	     LABS/STUDIES  --------------------------------------------------------------------------------              9.2    6.42  >-----------<  197      [04-10-22 @ 07:14]              30.8     137  |  95  |  30.6  ----------------------------<  99      [04-10-22 @ 07:14]  4.1   |  26.0  |  3.21        Ca     9.3     [04-10-22 @ 07:14]      Mg     1.8     [04-10-22 @ 07:14]      Phos  3.7     [04-10-22 @ 07:14]        PTT: 50.7       [04-10-22 @ 08:44]      Creatinine Trend:  SCr 3.21 [04-10 @ 07:14]  SCr 3.35 [04-08 @ 06:37]  SCr 4.90 [04-07 @ 08:57]  SCr 4.55 [04-06 @ 18:21]        Iron 43, TIBC 249, %sat 17      [07-13-21 @ 16:07]  Ferritin 498      [07-13-21 @ 16:07]  HbA1c 4.9      [08-09-18 @ 05:54]  Lipid: chol 126, , HDL 60, LDL --      [06-03-21 @ 06:07]

## 2022-04-10 NOTE — PROGRESS NOTE ADULT - ASSESSMENT
1) ESRD on HD- TS schedule  2)  Anemia of CKD  3) HTN/ LE edema  4) CKD MBD    Tentative HD for tomorrow post angio; if no angio; will be dialyzed Tuesday per schedule

## 2022-04-10 NOTE — PROGRESS NOTE ADULT - ASSESSMENT
· Assessment	  86 yo M with rest pain, Ischemic left lower extremity, chronic. Secondary to failed distal bypass graft.     - Continue heparin ggt at a low dose with no bolus after labs are obtained (History of Gi Bleed)  - pain control, avoiding narcotic medications  - Cards consulted: high risk for high risk procedure; TTE, 72hr tele monitor post-op  - Plan for bypass next week, first angio 4/11

## 2022-04-10 NOTE — PROGRESS NOTE ADULT - SUBJECTIVE AND OBJECTIVE BOX
Subjective:    Patient examined at bedside this AM. Reports stable pain in LLE. No acute events overnight. remains afebrile. HDN stable.    MEDICATIONS  (STANDING):  aspirin  chewable 81 milliGRAM(s) Oral daily  atorvastatin 80 milliGRAM(s) Oral at bedtime  DULoxetine 60 milliGRAM(s) Oral daily  epoetin juan-epbx (RETACRIT) Injectable 01521 Unit(s) IV Push <User Schedule>  heparin  Infusion.  Unit(s)/Hr (12 mL/Hr) IV Continuous <Continuous>  isosorbide   mononitrate ER Tablet (IMDUR) 30 milliGRAM(s) Oral daily  melatonin 3 milliGRAM(s) Oral at bedtime  multivitamin 1 Tablet(s) Oral daily  NIFEdipine XL 90 milliGRAM(s) Oral daily  NIFEdipine XL 60 milliGRAM(s) Oral <User Schedule>  pantoprazole    Tablet 40 milliGRAM(s) Oral before breakfast  sevelamer carbonate 800 milliGRAM(s) Oral three times a day  torsemide 20 milliGRAM(s) Oral daily    MEDICATIONS  (PRN):  acetaminophen     Tablet .. 975 milliGRAM(s) Oral every 6 hours PRN Mild Pain (1 - 3)  aluminum hydroxide/magnesium hydroxide/simethicone Suspension 30 milliLiter(s) Oral every 8 hours PRN Dyspepsia  ondansetron Injectable 4 milliGRAM(s) IV Push every 6 hours PRN Nausea  oxyCODONE    IR 5 milliGRAM(s) Oral every 4 hours PRN Moderate Pain (4 - 6)  oxyCODONE    IR 10 milliGRAM(s) Oral every 4 hours PRN Severe Pain (7 - 10)        Vital Signs Last 24 Hrs  T(C): 36.6 (09 Apr 2022 22:21), Max: 36.7 (09 Apr 2022 08:45)  T(F): 97.8 (09 Apr 2022 22:21), Max: 98.1 (09 Apr 2022 12:32)  HR: 63 (09 Apr 2022 22:21) (58 - 73)  BP: 143/63 (09 Apr 2022 22:21) (107/49 - 169/62)  BP(mean): --  RR: 18 (09 Apr 2022 22:21) (18 - 19)  SpO2: 94% (09 Apr 2022 22:21) (88% - 98%)      I&O's Detail    08 Apr 2022 07:01  -  09 Apr 2022 07:00  --------------------------------------------------------  IN:    Oral Fluid: 240 mL  Total IN: 240 mL    OUT:  Total OUT: 0 mL    Total NET: 240 mL      09 Apr 2022 07:01  -  10 Apr 2022 00:09  --------------------------------------------------------  IN:    Oral Fluid: 120 mL  Total IN: 120 mL    OUT:    Other (mL): 2000 mL  Total OUT: 2000 mL    Total NET: -1880 mL              Physical Exam:    Physical Exam:  General: alert and calm, NAD  Resp: airway patent, respirations unlabored  Extremities: LLE hyperemic appearing, warm and slightly tender to touch. Pitting edema to the bilateral lower ext, Left foot cooler than right from distal tibial region to the foot.  Left upper ext with AVF, good thrill and outflow venous distention  Vascular: palpable radials,  Femorals are palpable but unequal (Right > Left),  no papable pedals. Doppler L popliteal and no signals to DP/PT/AT.      LABS:                                   9.8    7.20  )-----------( 214      ( 09 Apr 2022 06:29 )             32.2   04-08    138  |  97<L>  |  31.8<H>  ----------------------------<  100<H>  4.0   |  27.0  |  3.35<H>    Ca    9.2      08 Apr 2022 06:37  Phos  3.2     04-08  Mg     1.9     04-08        PTT - ( 09 Apr 2022 06:29 )  PTT:65.9 sec

## 2022-04-11 NOTE — PROGRESS NOTE ADULT - ASSESSMENT
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 Sorenson & Daughter,     Family  Prefer Lovenox,

## 2022-04-11 NOTE — PROGRESS NOTE ADULT - ASSESSMENT
86 yo M with rest pain, Ischemic left lower extremity, chronic. Secondary to failed distal bypass graft.     - Continue heparin ggt at a low dose with no bolus after labs are obtained (History of Gi Bleed), cna be held on her way to cath lab.  - pain control, avoiding narcotic medications  - Cards consulted: high risk for high risk procedure; TTE, 72hr tele monitor post-op  - Plan for bypass  this week  - LLE angio today 4/11

## 2022-04-11 NOTE — PROGRESS NOTE ADULT - SUBJECTIVE AND OBJECTIVE BOX
INTERVAL HPI/OVERNIGHT EVENTS:    Patient evaluated at bedside. No acute distress. No acute events overnight. Patient confused this morning and re-oriented. Reports that the pain on the LLE remains stable. Remains hemodynamically stable and afebrile.     MEDICATIONS  (STANDING):  aspirin  chewable 81 milliGRAM(s) Oral daily  atorvastatin 80 milliGRAM(s) Oral at bedtime  DULoxetine 60 milliGRAM(s) Oral daily  epoetin juan-epbx (RETACRIT) Injectable 79491 Unit(s) IV Push <User Schedule>  heparin  Infusion.  Unit(s)/Hr (12 mL/Hr) IV Continuous <Continuous>  isosorbide   mononitrate ER Tablet (IMDUR) 30 milliGRAM(s) Oral daily  melatonin 3 milliGRAM(s) Oral at bedtime  multivitamin 1 Tablet(s) Oral daily  NIFEdipine XL 90 milliGRAM(s) Oral daily  pantoprazole    Tablet 40 milliGRAM(s) Oral before breakfast  sevelamer carbonate 800 milliGRAM(s) Oral three times a day  torsemide 20 milliGRAM(s) Oral daily    MEDICATIONS  (PRN):  acetaminophen     Tablet .. 975 milliGRAM(s) Oral every 6 hours PRN Mild Pain (1 - 3)  aluminum hydroxide/magnesium hydroxide/simethicone Suspension 30 milliLiter(s) Oral every 8 hours PRN Dyspepsia  ketorolac   Injectable 15 milliGRAM(s) IV Push every 8 hours PRN Severe Pain (7 - 10)  ondansetron Injectable 4 milliGRAM(s) IV Push every 6 hours PRN Nausea  oxyCODONE    IR 5 milliGRAM(s) Oral every 4 hours PRN Moderate Pain (4 - 6)  oxyCODONE    IR 10 milliGRAM(s) Oral every 4 hours PRN Severe Pain (7 - 10)      Vital Signs Last 24 Hrs  T(C): 36.7 (11 Apr 2022 04:43), Max: 36.7 (10 Apr 2022 07:53)  T(F): 98 (11 Apr 2022 04:43), Max: 98 (10 Apr 2022 07:53)  HR: 58 (11 Apr 2022 04:43) (58 - 70)  BP: 127/52 (11 Apr 2022 04:43) (119/63 - 136/66)  BP(mean): --  RR: 18 (11 Apr 2022 04:43) (18 - 18)  SpO2: 99% (11 Apr 2022 04:43) (94% - 99%)    Physical Exam:  General: alert and calm, NAD  Resp: airway patent, respirations unlabored  Extremities: LLE hyperemic appearing, warm and slightly tender to touch. Pitting edema to the bilateral lower ext, Left foot cooler than right from distal tibial region to the foot.  Left upper ext with AVF, good thrill and outflow venous distention  Vascular: palpable radials,  Femorals are palpable but unequal (Right > Left),  no papable pedals. Doppler L popliteal and no signals to DP/PT/AT.      I&O's Detail    10 Apr 2022 07:01  -  11 Apr 2022 07:00  --------------------------------------------------------  IN:    Heparin Infusion: 144 mL    Oral Fluid: 960 mL  Total IN: 1104 mL    OUT:  Total OUT: 0 mL    Total NET: 1104 mL          LABS:                        9.1    7.85  )-----------( 207      ( 11 Apr 2022 06:18 )             30.2     04-10    137  |  95<L>  |  30.6<H>  ----------------------------<  99  4.1   |  26.0  |  3.21<H>    Ca    9.3      10 Apr 2022 07:14  Phos  3.7     04-10  Mg     1.8     04-10      PT/INR - ( 10 Apr 2022 17:36 )   PT: 14.7 sec;   INR: 1.26 ratio         PTT - ( 11 Apr 2022 01:13 )  PTT:56.4 sec      RADIOLOGY & ADDITIONAL STUDIES:

## 2022-04-11 NOTE — PROGRESS NOTE ADULT - SUBJECTIVE AND OBJECTIVE BOX
· Operative Findings	Occluded LEFT SFA and Popliteal artery with reconstitution of AT and PT with collaterals.    [facility] DIVISION OF KIDNEY DISEASES AND HYPERTENSION -- HEMODIALYSIS NOTE  --------------------------------------------------------------------------------  Chief Complaint: ESRD/Ongoing hemodialysis requirement    24 hour events/subjective: S/P Angiogram & Vascular intervention,     PAST HISTORY:     DN, ESRD - HD BIW ( T S )   --------------------------------------------------------------------------------  No significant changes to PMH, PSH, FHx, SHx, unless otherwise noted    ALLERGIES & MEDICATIONS  --------------------------------------------------------------------------------  Allergies    Plavix (Hives)  Toprol-XL (Rash)    Intolerances- None,     Standing Inpatient Medications  aspirin  chewable 81 milliGRAM(s) Oral daily  atorvastatin 80 milliGRAM(s) Oral at bedtime  DULoxetine 60 milliGRAM(s) Oral daily  epoetin juan-epbx (RETACRIT) Injectable 60428 Unit(s) IV Push <User Schedule>  heparin  Infusion.  Unit(s)/Hr IV Continuous <Continuous>  isosorbide   mononitrate ER Tablet (IMDUR) 30 milliGRAM(s) Oral daily  melatonin 3 milliGRAM(s) Oral at bedtime  multivitamin 1 Tablet(s) Oral daily  NIFEdipine XL 90 milliGRAM(s) Oral daily  pantoprazole    Tablet 40 milliGRAM(s) Oral before breakfast  sevelamer carbonate 800 milliGRAM(s) Oral three times a day  torsemide 20 milliGRAM(s) Oral daily    PRN Inpatient Medications  acetaminophen     Tablet .. 975 milliGRAM(s) Oral every 6 hours PRN  aluminum hydroxide/magnesium hydroxide/simethicone Suspension 30 milliLiter(s) Oral every 8 hours PRN  ketorolac   Injectable 15 milliGRAM(s) IV Push every 8 hours PRN  ondansetron Injectable 4 milliGRAM(s) IV Push every 6 hours PRN  oxyCODONE    IR 5 milliGRAM(s) Oral every 4 hours PRN  oxyCODONE    IR 10 milliGRAM(s) Oral every 4 hours PRN    REVIEW OF SYSTEMS  --------------------------------------------------------------------------------  Gen: + weight changes, fatigue, No fevers/chills, weakness  Skin: No rashes  Head/Eyes/Ears/Mouth: No headache; Impaired  hearing; Impaired vision w/o blurriness; No sinus pain/discomfort, sore throat  Respiratory: No dyspnea, cough, wheezing, hemoptysis  CV: No chest pain, PND, orthopnea  GI: No abdominal pain, diarrhea, constipation, nausea, vomiting, melena, hematochezia  : No increased frequency, dysuria, hematuria, nocturia  MSK: No joint pain/swelling; no back pain; + edema  Neuro: No dizziness/lightheadedness, weakness, seizures, +++ numbness, tingling  Heme: + easy bruising , bleeding  Endo: No heat/ ++ cold intolerance  Psych: + significant nervousness, anxiety, stress, depression    All other systems were reviewed and are negative, except as noted.    VITALS/PHYSICAL EXAM  --------------------------------------------------------------------------------  T(C): 36.7 (04-11-22 @ 07:24), Max: 36.7 (04-10-22 @ 22:45)  HR: 72 (04-11-22 @ 12:03) (52 - 73)  BP: 141/60 (04-11-22 @ 12:03) (120/51 - 166/72)  RR: 15 (04-11-22 @ 12:03) (14 - 18)  SpO2: 93% (04-11-22 @ 12:03) (91% - 99%)  Wt(kg): -- NA,     04-10-22 @ 07:01  -  04-11-22 @ 07:00  --------------------------------------------------------  IN: 1104 mL / OUT: 0 mL / NET: 1104 mL    Physical Exam:  	Gen: NAD, Pale, ill-appearing  	HEENT: PERRL, supple neck, clear oropharynx  	Pulm: Coarse BS  B/L  	CV: S1S2; no rub  	Back: No spinal or CVA tenderness; + sacral edema  	Abd: +BS, soft, nontender/nondistended  	: No suprapubic tenderness  	UE: Warm, FROM, no clubbing, intact strength; no edema; no asterixis  	LE: Warm, FROM, no clubbing, intact strength; no edema  	Neuro: No focal deficits, Unsteady Gailt,   	Psych: Depressed  affect and mood  	Skin: Warm, without rashes  	Vascular access: AVF +    LABS/STUDIES  --------------------------------------------------------------------------------              9.1    7.85  >-----------<  207      [04-11-22 @ 06:18]              30.2     137  |  95  |  30.6  ----------------------------<  99      [04-10-22 @ 07:14]  4.1   |  26.0  |  3.21        Ca     9.3     [04-10-22 @ 07:14]      Mg     1.8     [04-10-22 @ 07:14]      Phos  3.7     [04-10-22 @ 07:14]    PT/INR: PT 15.0 , INR 1.29       [04-11-22 @ 07:23]  PTT: 54.5       [04-11-22 @ 07:23]    Iron 43, TIBC 249, %sat 17      [07-13-21 @ 16:07]  Ferritin 498      [07-13-21 @ 16:07]  HbA1c 4.9      [08-09-18 @ 05:54]  Lipid: chol 126, , HDL 60, LDL --      [06-03-21 @ 06:07]    Patient was seen and evaluated on dialysis.   Patient is tolerating the procedure well.   T(C): 36.7 (04-11-22 @ 07:24), Max: 36.7 (04-10-22 @ 22:45)  HR: 72 (04-11-22 @ 12:03) (52 - 73)  BP: 141/60 (04-11-22 @ 12:03) (120/51 - 166/72)  Continue dialysis: in AM,   Dialyzer: Revaclear 300         QB:  400 ml.,      QD: 500ml.,  Goal UF _As Josh., _ over _3.5 _ Hours     Pt is seen and examined on dialysis. No symptoms. Hemodynamics stable. Tolerating dialysis and ultrafiltration.  Pre Laboratory values personally reviewed by me.  Dialysis adjusted appropriately based on current values.  Will continue the current medical management.  Next hemodialysis as scheduled.  Discussed with nursing, primary care team.

## 2022-04-12 NOTE — DIETITIAN NUTRITION RISK NOTIFICATION - PHYSICAL ASSESSMENT TEMPLES
[facility]  [address]  [facility] [address]  Phone:  [phone]  Fax:  [phone]                                    After Visit Summary   2/6/2020    Otilio Jolly    MRN: [id number]           After Visit Summary Signature Page    I have received my discharge instructions, and my questions have been answered. I have discussed any challenges I see with this plan with the nurse or doctor.    ..........................................................................................................................................  Patient/Patient Representative Signature      ..........................................................................................................................................  Patient Representative Print Name and Relationship to Patient    ..................................................               ................................................  Date                                   Time    ..........................................................................................................................................  Reviewed by Signature/Title    ...................................................              ..............................................  Date                                               Time          22EPIC Rev 08/18        severe

## 2022-04-12 NOTE — DIETITIAN INITIAL EVALUATION ADULT - PERTINENT LABORATORY DATA
04-12    137  |  96<L>  |  28.1<H>  ----------------------------<  105<H>  4.0   |  27.0  |  3.20<H>    Ca    8.9      12 Apr 2022 06:39  Phos  3.4     04-12  Mg     1.9     04-12    A1C with Estimated Average Glucose Result: 5.0 % (07-14-21 @ 11:36)

## 2022-04-12 NOTE — DIETITIAN NUTRITION RISK NOTIFICATION - TREATMENT: THE FOLLOWING DIET HAS BEEN RECOMMENDED
Diet, Renal Restrictions:   For patients receiving Renal Replacement - No Protein Restr, No Conc K, No Conc Phos, Low Sodium (04-06-22 @ 18:12) [Active]

## 2022-04-12 NOTE — DIETITIAN INITIAL EVALUATION ADULT - NUTRITION DIAGNOSITC TERMINOLOGY #1
Malnutrition... Detail Level: Detailed Treatment Number (Will Not Render If 0): 0 Add 52 Modifier (Optional): no Consent: The patient's consent was obtained including but not limited to risks of crusting, scabbing, blistering, scarring, darker or lighter pigmentary change, recurrence, incomplete removal and infection. Medical Necessity Information: It is in your best interest to select a reason for this procedure from the list below. All of these items fulfill various CMS LCD requirements except the new and changing color options. Anesthesia Volume In Cc: 0.5 Medical Necessity Clause: This procedure was medically necessary because the lesions that were treated were: Post-Care Instructions: I reviewed with the patient in detail post-care instructions. Patient is to wear sunprotection, and avoid picking at any of the treated lesions. Pt may apply Vaseline to crusted or scabbing areas.

## 2022-04-12 NOTE — PROGRESS NOTE ADULT - SUBJECTIVE AND OBJECTIVE BOX
INTERVAL HPI/OVERNIGHT EVENTS:    Patient evaluated at bedside. No acute distress. Angio POD 1 in preparation for bypass    MEDICATIONS  (STANDING):  aspirin  chewable 81 milliGRAM(s) Oral daily  atorvastatin 80 milliGRAM(s) Oral at bedtime  chlorhexidine 2% Cloths 1 Application(s) Topical <User Schedule>  DULoxetine 60 milliGRAM(s) Oral daily  epoetin juan-epbx (RETACRIT) Injectable 40896 Unit(s) IV Push <User Schedule>  heparin  Infusion. 1300 Unit(s)/Hr (13 mL/Hr) IV Continuous <Continuous>  isosorbide   mononitrate ER Tablet (IMDUR) 30 milliGRAM(s) Oral daily  melatonin 3 milliGRAM(s) Oral at bedtime  multivitamin 1 Tablet(s) Oral daily  NIFEdipine XL 90 milliGRAM(s) Oral daily  pantoprazole    Tablet 40 milliGRAM(s) Oral before breakfast  sevelamer carbonate 800 milliGRAM(s) Oral three times a day  torsemide 20 milliGRAM(s) Oral daily    MEDICATIONS  (PRN):  acetaminophen     Tablet .. 975 milliGRAM(s) Oral every 6 hours PRN Mild Pain (1 - 3)  aluminum hydroxide/magnesium hydroxide/simethicone Suspension 30 milliLiter(s) Oral every 8 hours PRN Dyspepsia  ketorolac   Injectable 15 milliGRAM(s) IV Push every 8 hours PRN Severe Pain (7 - 10)  ondansetron Injectable 4 milliGRAM(s) IV Push every 6 hours PRN Nausea  oxyCODONE    IR 5 milliGRAM(s) Oral every 4 hours PRN Moderate Pain (4 - 6)  oxyCODONE    IR 10 milliGRAM(s) Oral every 4 hours PRN Severe Pain (7 - 10)    Vital Signs Last 24 Hrs  T(C): 36.6 (12 Apr 2022 05:20), Max: 36.7 (11 Apr 2022 07:24)  T(F): 97.8 (12 Apr 2022 05:20), Max: 98.1 (11 Apr 2022 18:06)  HR: 79 (12 Apr 2022 05:20) (52 - 79)  BP: 145/77 (12 Apr 2022 05:20) (126/68 - 166/72)  BP(mean): 99 (12 Apr 2022 05:20) (93 - 99)  RR: 17 (12 Apr 2022 05:20) (14 - 18)  SpO2: 95% (12 Apr 2022 05:20) (91% - 95%)    Physical Exam:  General: alert and calm, NAD  Resp: airway patent, respirations unlabored  Extremities: LLE hyperemic appearing, warm and slightly tender to touch. Pitting edema to the bilateral lower ext, Left foot cooler than right from distal tibial region to the foot.  Left upper ext with AVF, good thrill and outflow venous distention  Vascular: palpable radials,  Femorals are palpable but unequal (Right > Left),  no papable pedals. Doppler L popliteal and no signals to DP/PT/AT.      I&O's Detail    10 Apr 2022 07:01  -  11 Apr 2022 07:00  --------------------------------------------------------  IN:    Heparin Infusion: 144 mL    Oral Fluid: 960 mL  Total IN: 1104 mL    OUT:  Total OUT: 0 mL    Total NET: 1104 mL      11 Apr 2022 07:01  -  12 Apr 2022 06:49  --------------------------------------------------------  IN:    Heparin Infusion: 130 mL    Oral Fluid: 480 mL  Total IN: 610 mL    OUT:    Other (mL): 2000 mL  Total OUT: 2000 mL    Total NET: -1390 mL        LABS:                                   9.0    6.15  )-----------( 197      ( 12 Apr 2022 01:31 )             29.5     04-10    137  |  95<L>  |  30.6<H>  ----------------------------<  99  4.1   |  26.0  |  3.21<H>    Ca    9.3      10 Apr 2022 07:14  Phos  3.7     04-10  Mg     1.8     04-10

## 2022-04-12 NOTE — PROGRESS NOTE ADULT - ASSESSMENT
3 HRS HD completed with 1 liter fluid removed  VSS afebrile  Lt AVF access care done  Report given to primary RN

## 2022-04-12 NOTE — DIETITIAN INITIAL EVALUATION ADULT - ORAL INTAKE PTA/DIET HISTORY
Pt currently sleeping.  Pt with fair po intake at meals per nursing assistant; consumed ~50% at breakfast this morning.

## 2022-04-12 NOTE — PROGRESS NOTE ADULT - ASSESSMENT
86 yo M with rest pain, Ischemic left lower extremity, chronic. Secondary to failed distal bypass graft.     - Continue heparin ggt at a low dose with no bolus after labs are obtained (History of Gi Bleed), will hold preop  - pain control, avoiding narcotic medications  - Cards consulted: high risk for high risk procedure; TTE, 72hr tele monitor post-op  - Plan for bypass  this week 4/13  - will prepare blood for OR

## 2022-04-12 NOTE — DIETITIAN INITIAL EVALUATION ADULT - PERTINENT MEDS FT
MEDICATIONS  (STANDING):  aspirin  chewable 81 milliGRAM(s) Oral daily  atorvastatin 80 milliGRAM(s) Oral at bedtime  chlorhexidine 2% Cloths 1 Application(s) Topical <User Schedule>  DULoxetine 60 milliGRAM(s) Oral daily  epoetin juan-epbx (RETACRIT) Injectable 26462 Unit(s) IV Push <User Schedule>  heparin  Infusion. 1300 Unit(s)/Hr (13 mL/Hr) IV Continuous <Continuous>  isosorbide   mononitrate ER Tablet (IMDUR) 30 milliGRAM(s) Oral daily  melatonin 3 milliGRAM(s) Oral at bedtime  multivitamin 1 Tablet(s) Oral daily  NIFEdipine XL 90 milliGRAM(s) Oral daily  pantoprazole    Tablet 40 milliGRAM(s) Oral before breakfast  sevelamer carbonate 800 milliGRAM(s) Oral three times a day  torsemide 20 milliGRAM(s) Oral daily    MEDICATIONS  (PRN):  acetaminophen     Tablet .. 975 milliGRAM(s) Oral every 6 hours PRN Mild Pain (1 - 3)  aluminum hydroxide/magnesium hydroxide/simethicone Suspension 30 milliLiter(s) Oral every 8 hours PRN Dyspepsia  ketorolac   Injectable 15 milliGRAM(s) IV Push every 8 hours PRN Severe Pain (7 - 10)  ondansetron Injectable 4 milliGRAM(s) IV Push every 6 hours PRN Nausea  oxyCODONE    IR 5 milliGRAM(s) Oral every 4 hours PRN Moderate Pain (4 - 6)  oxyCODONE    IR 10 milliGRAM(s) Oral every 4 hours PRN Severe Pain (7 - 10)

## 2022-04-12 NOTE — PROGRESS NOTE ADULT - SUBJECTIVE AND OBJECTIVE BOX
87M known to our service, PMhx of  ESRD on HD (M/F), anemia, CHFpEF, arrythmia, HTN, HLD, CAD, a-fib and LLE DVT (used to be on Flecainide and no longer on AC due to GI bleed), AS s/p TAVR, and PAD (RLE fem-pop bypass, revised LLE fem-pop bypass, March 2021, CFA to AT with cryovein ) presents with red, swollen painful left leg worsening for 1 week.    Cardiology note appreciated. TTE/stress reviewed. Patient requires no further consult or intervention prior to OR.      Mango Murphy MD

## 2022-04-12 NOTE — DIETITIAN INITIAL EVALUATION ADULT - ETIOLOGY
related to inability to consume sufficient protein energy intake in setting of extensive medical hx and multiple hospitalizations

## 2022-04-12 NOTE — DIETITIAN INITIAL EVALUATION ADULT - NS FNS DIET ORDER
Diet, Renal Restrictions:   For patients receiving Renal Replacement - No Protein Restr, No Conc K, No Conc Phos, Low Sodium (04-06-22 @ 18:12)

## 2022-04-12 NOTE — PROGRESS NOTE ADULT - SUBJECTIVE AND OBJECTIVE BOX
Patient was seen and evaluated on dialysis.   Patient is tolerating the procedure well.   T(C): 36.7 (04-13-22 @ 05:15), Max: 37.2 (04-12-22 @ 20:11)  HR: 70 (04-13-22 @ 05:15) (60 - 79)  BP: 141/74 (04-13-22 @ 05:15) (108/44 - 155/68)  Continue dialysis: Friday,   Dialyzer:  Revaclear 300        QB: 400 ml.,        QD: 500ml.,  Goal UF _1 L _ over _3_ Hours     Pt is seen and examined on dialysis. No symptoms. Hemodynamics stable. Tolerating dialysis and ultrafiltration.  Pre Laboratory values personally reviewed by me.  Dialysis adjusted appropriately based on current values.  Will continue the current medical management.  Next hemodialysis as scheduled.  Discussed with nursing, primary care team.

## 2022-04-13 NOTE — PROGRESS NOTE ADULT - SUBJECTIVE AND OBJECTIVE BOX
INTERVAL HPI/OVERNIGHT EVENTS:    Patient evaluated at bedside. No acute distress. Patient removed his IV this morning and would require new access.   Remains hemodynamically stable and afebrile.   Require LLE bypass today.    MEDICATIONS  (STANDING):  aspirin  chewable 81 milliGRAM(s) Oral daily  atorvastatin 80 milliGRAM(s) Oral at bedtime  chlorhexidine 2% Cloths 1 Application(s) Topical <User Schedule>  DULoxetine 60 milliGRAM(s) Oral daily  epoetin juan-epbx (RETACRIT) Injectable 68429 Unit(s) IV Push <User Schedule>  heparin  Infusion. 1300 Unit(s)/Hr (13 mL/Hr) IV Continuous <Continuous>  isosorbide   mononitrate ER Tablet (IMDUR) 30 milliGRAM(s) Oral daily  melatonin 3 milliGRAM(s) Oral at bedtime  multivitamin 1 Tablet(s) Oral daily  NIFEdipine XL 90 milliGRAM(s) Oral daily  pantoprazole    Tablet 40 milliGRAM(s) Oral before breakfast  sevelamer carbonate 800 milliGRAM(s) Oral three times a day  torsemide 20 milliGRAM(s) Oral daily    MEDICATIONS  (PRN):  acetaminophen     Tablet .. 975 milliGRAM(s) Oral every 6 hours PRN Mild Pain (1 - 3)  aluminum hydroxide/magnesium hydroxide/simethicone Suspension 30 milliLiter(s) Oral every 8 hours PRN Dyspepsia  ketorolac   Injectable 15 milliGRAM(s) IV Push every 8 hours PRN Severe Pain (7 - 10)  ondansetron Injectable 4 milliGRAM(s) IV Push every 6 hours PRN Nausea  oxyCODONE    IR 5 milliGRAM(s) Oral every 4 hours PRN Moderate Pain (4 - 6)  oxyCODONE    IR 10 milliGRAM(s) Oral every 4 hours PRN Severe Pain (7 - 10)      Vital Signs Last 24 Hrs  T(C): 36.7 (13 Apr 2022 05:15), Max: 37.2 (12 Apr 2022 20:11)  T(F): 98 (13 Apr 2022 05:15), Max: 99 (12 Apr 2022 20:11)  HR: 70 (13 Apr 2022 05:15) (60 - 79)  BP: 141/74 (13 Apr 2022 05:15) (108/44 - 155/68)  BP(mean): 96 (13 Apr 2022 05:15) (96 - 96)  RR: 18 (13 Apr 2022 05:15) (17 - 18)  SpO2: 97% (13 Apr 2022 05:15) (93% - 100%)    Physical Exam:  General: alert, NAD  Resp: airway patent, respirations unlabored  Extremities: LLE hyperemic appearing, warm and slightly tender to touch. Pitting edema to the bilateral lower ext, Left foot cooler than right from distal tibial region to the foot.  Left upper ext with AVF, good thrill and outflow venous distention  Vascular: palpable radials,  Femorals are palpable but unequal (Right > Left),  no papable pedals. Doppler L popliteal and no signals to DP/PT/AT.      I&O's Detail    12 Apr 2022 07:01  -  13 Apr 2022 07:00  --------------------------------------------------------  IN:  Total IN: 0 mL    OUT:    Other (mL): 1000 mL  Total OUT: 1000 mL    Total NET: -1000 mL          LABS:                        9.1    6.79  )-----------( 200      ( 13 Apr 2022 05:10 )             29.5     04-13    136  |  95<L>  |  18.9  ----------------------------<  91  3.9   |  27.0  |  2.51<H>    Ca    9.0      13 Apr 2022 05:54  Phos  2.9     04-13  Mg     1.7     04-13      PT/INR - ( 11 Apr 2022 13:22 )   PT: 14.9 sec;   INR: 1.28 ratio         PTT - ( 13 Apr 2022 05:10 )  PTT:76.7 sec      RADIOLOGY & ADDITIONAL STUDIES:

## 2022-04-13 NOTE — PROGRESS NOTE ADULT - ASSESSMENT
88 yo M with rest pain, Ischemic left lower extremity, chronic. Secondary to failed distal bypass graft. S/P LLE diagnostic angio on 4/11 with occluded SFA and pop    - Continue heparin ggt at a low dose with no bolus after labs are obtained (History of Gi Bleed), will hold preop  - pain control, avoiding narcotic medications  - Cards consulted: high risk for high risk procedure; TTE, 72hr tele monitor post-op  - Plan for bypass Today

## 2022-04-13 NOTE — PROGRESS NOTE ADULT - SUBJECTIVE AND OBJECTIVE BOX
INTERVAL HPI/ OVERNIGHT EVENTS:    Patient evaluated at bedside.   No acute distress.   Remains hemodynamically stable and afebrile.     MEDICATIONS  (STANDING):    aspirin  chewable 81 milliGRAM(s) Oral daily  atorvastatin 80 milliGRAM(s) Oral at bedtime  chlorhexidine 2% Cloths 1 Application(s) Topical <User Schedule>  DULoxetine 60 milliGRAM(s) Oral daily  epoetin juan-epbx (RETACRIT) Injectable 21780 Unit(s) IV Push <User Schedule>  heparin  Infusion. 1300 Unit(s)/Hr (13 mL/Hr) IV Continuous <Continuous>  isosorbide   mononitrate ER Tablet (IMDUR) 30 milliGRAM(s) Oral daily  melatonin 3 milliGRAM(s) Oral at bedtime  multivitamin 1 Tablet(s) Oral daily  NIFEdipine XL 90 milliGRAM(s) Oral daily  pantoprazole    Tablet 40 milliGRAM(s) Oral before breakfast  sevelamer carbonate 800 milliGRAM(s) Oral three times a day  torsemide 20 milliGRAM(s) Oral daily    MEDICATIONS  (PRN):  acetaminophen     Tablet .. 975 milliGRAM(s) Oral every 6 hours PRN Mild Pain (1 - 3)  aluminum hydroxide/magnesium hydroxide/simethicone Suspension 30 milliLiter(s) Oral every 8 hours PRN Dyspepsia  ketorolac   Injectable 15 milliGRAM(s) IV Push every 8 hours PRN Severe Pain (7 - 10)  ondansetron Injectable 4 milliGRAM(s) IV Push every 6 hours PRN Nausea  oxyCODONE    IR 5 milliGRAM(s) Oral every 4 hours PRN Moderate Pain (4 - 6)  oxyCODONE    IR 10 milliGRAM(s) Oral every 4 hours PRN Severe Pain (7 - 10)    Vital Signs Last 24 Hrs  T(C): 36.7 (2022 05:15), Max: 37.2 (2022 20:11)  T(F): 98 (2022 05:15), Max: 99 (2022 20:11)  HR: 70 (2022 05:15) (60 - 79)  BP: 141/74 (2022 05:15) (108/44 - 155/68)  BP(mean): 96 (2022 05:15) (96 - 96)  RR: 18 (2022 05:15) (17 - 18)  SpO2: 97% (2022 05:15) (93% - 100%)    Physical Exam:    General: alert, NAD, Pale,   Resp: airway patent, respirations unlabored, + PPM,   Extremities: LLE hyperemic appearing, warm and slightly tender to touch.   Pitting edema to the bilateral lower ext, Left foot cooler than right from distal tibial region to the foot.    Left upper ext with AVF, good thrill and outflow venous distention  Vascular: palpable radials,  Femorals are palpable but unequal (Right > Left),    No palpable pedals.   Doppler :  L popliteal -  no signals to DP/ PT/ AT.    I&O's Detail    2022 07:01  -  2022 07:00  --------------------------------------------------------  IN:  Total IN: 0 mL    OUT:    Other (mL): 1000 mL  Total OUT: 1000 mL    Total NET: -1000 mL    LABS:                        9.1    6.79  )-----------( 200      ( 2022 05:10 )             29.5     04-13    136  |  95<L>  |  18.9  ----------------------------<  91  3.9   |  27.0  |  2.51<H>    Ca    9.0      2022 05:54  Phos  2.9     04-13  Mg     1.7     04-13      PT/INR - ( 2022 13:22 )   PT: 14.9 sec;   INR: 1.28 ratio      PTT - ( 2022 05:10 )  PTT:76.7 sec    TTE Echo Complete w/ Contrast w/ Doppler (22 @ 10:10)     ACC: [id number] EXAM:  ECHO TTE WITH CON COMP W DOPP                          PROCEDURE DATE:  2022      INTERPRETATION:  TRANSTHORACIC ECHOCARDIOGRAM REPORT    Patient Name:   CHRISTIANO ELIZABETH Patient Location: Inpatient  Medical Rec #:  [id number]    Accession #:      [id number]  Account #:                    Height:           65.0 in 165.0 cm  YOB: 1934     Weight:           149.9 lb 68.00 kg  Patient Age:    87 years      BSA:              1.75 m²  Patient Gender: M          BP:               127/65 mmHg      Date of Exam:        2022 10:10:09 AM  Sonographer:         Stephanie Johnson  Referring Physician: Len Segal MD    Procedure:   2D Echo/Doppler/Color Doppler Complete.  Indications: Chronic ischemicheart disease, unspecified - I25.9  Diagnosis:   Chronic ischemic heart disease, unspecified - I25.9        2D AND M-MODE MEASUREMENTS (normal ranges within parentheses):  Left                 Normal   Aorta/Left            Normal  Ventricle:           Atrium:  IVSd (2D):    1.38  (0.7-1.1) Left Atrium    4.22  (1.9-4.0)                 cm             (2D):           cm  LVPWd (2D):   1.31  (0.7-1.1) LA Volume      51.8                 cm             Index         ml/m²  LVIDd (2D):   3.55  (3.4-5.7)                 cm  LVIDs (2D):   2.70                 cm  LV FS (2D):   23.9   (>25%)                  %  Relative Wall 0.74   (<0.42)  Thickness    LV SYSTOLIC FUNCTION BY 2D PLANIMETRY (MOD):  EF-A4C View: 61.3 % EF-A2C View: 69.1 % EF-Biplane: 65 %    LV DIASTOLIC FUNCTION:  MV Peak E: 2.67 m/s  MV Peak A: 1.54 m/s  E/A Ratio: 1.73    SPECTRAL DOPPLER ANALYSIS (where applicable):  Mitral Valve:  MV Mean Grad: 13.4 mmHg    Aortic Valve: AoV Max Chuy: 2.08 m/s AoV Peak P.4 mmHgAoV Mean P.9 mmHg    LVOT Vmax: 1.12 m/s LVOT VTI: 0.230 m LVOT Diameter:    AoV Area, Vmax:  AoV Area, VTI:  AoV Area, Vmn:  Ao VTI: 0.438  Tricuspid Valve and PA/RV Systolic Pressure: TR Max Velocity: 3.98 m/s RA   Pressure: 3 mmHg RVSP/PASP:66.4 mmHg      PHYSICIAN INTERPRETATION:  Left Ventricle: The left ventricular internal cavity size is normal. Left   ventricular wall thickness is mildly increased.  Global LV systolic function was normal. Left ventricular ejection   fraction, by visual estimation, is 60 to 65%. Abnormal (paradoxical)   septal motion consistent with post-operative status.  Right Ventricle: Normal right ventricular size and function.  Left Atrium: Moderate to severe left atrial enlargement.  Right Atrium: Mild tomoderately enlarged right atrium.  Pericardium: There is no evidence of pericardial effusion. There is a   small pleural effusion in the right lateral region.  Mitral Valve: Thickening and calcification of the anterior and posterior   mitral valve leaflets. Moderate mitral valve regurgitation is seen.   Transmitral mean gradient is 13.4 mmHg at HR 89 bpm.  Tricuspid Valve: Mild-moderate tricuspid regurgitation is visualized.   Estimated pulmonary artery systolic pressure is 66.4 mmHg assuming a   right atrial pressure of 3 mmHg, which is consistent with severe   pulmonary hypertension.  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 No evidence of aortic valve   regurgitation is seen.  Pulmonic Valve: Structurally normal pulmonic valve, with normal leaflet   excursion. Trace pulmonic valve regurgitation.  Aorta: The aortic root is normal in size and structure.  Pulmonary Artery: The main pulmonary artery is normal in size.  Venous: The inferior vena cava is normal. The inferior vena cava was   normal sized, with respiratory size variation greater than 50%. The   inferior vena cava and the hepatic vein show a normal flow pattern.  In comparison to the previous echocardiogram(s): Prior examinations are   available and were reviewed for comparison purposes. Compared to prior   TTE study dated 2021, no significant interval changes have occurred.      Summary:   1. Left ventricular ejection fraction, by visual estimation, is 60 to   65%.   2. Normal global left ventricular systolic function.  3. Moderate to severe left atrial enlargement.   4. Abnormal septal motion consistent with post-operative status.   5. Mildly increased LV wall thickness.   6. Mild to moderately enlarged right atrium.   7. Moderate mitral valve regurgitation.   8. Thickening and calcification of the anterior and posterior mitral   valve leaflets.   9. Transmitral mean gradient is 13.4 mmHg at HR 89 bpm.  10. Mild-moderate tricuspid regurgitation.  11. 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 Gradients suggestive of normally   functioning prosthetic valve. No significant change from prior study   dated 2021.  12. Estimated pulmonary artery systolic pressure is 66.4 mmHg assuming a   right atrial pressure of 3 mmHg, which is consistent with severe   pulmonary hypertension.  13. Compared to prior TTE study dated 2021, no significant interval   changes have occurred.    BRITTNEE Olson Electronically signed on 2022 at 11:14:50 AM    12 Lead ECG (22 @ 16:25)     Ventricular Rate 71 BPM    Atrial Rate 70 BPM    QRS Duration 166 ms    Q-T Interval 472 ms    QTC Calculation(Bazett) 512 ms    P Axis 56 degrees    R Axis -85 degrees    T Axis 91 degrees    Diagnosis Line Ventricular-paced rhythm  Abnormal ECG    Confirmed by SHAR VICTORIA ([id number]) on 2022 12:56:59 AM    CT Angio Abd Aorta w/run-off w/ IV Cont (22 @ 12:27)     ACC: [id number] EXAM:  CT ANGIO ABD AOR W RUN(W)AW IC                          PROCEDURE DATE:  2022      INTERPRETATION:  CLINICAL INFORMATION: Peripheral artery disease of the   legs    COMPARISON: CT abdomen pelvis 2021    CONTRAST/COMPLICATIONS:  IV Contrast: Omnipaque 350  100 cc administered   0 cc discarded  Oral Contrast: NONE  Complications: None reported at time of study completion    CT ANGIOGRAM ABDOMEN, PELVIS, AND LOWER EXTREMITIES:    PROCEDURE:  Initially, nonenhanced CT was obtained through the calves. Then,   following the rapid administration of intravenous contrast, CT   angiography was performed through the abdomen, pelvis, and lower   extremities down to the toes.  Delayed images through the calves were   also obtained. Sagittal and coronal reformats as well as 3D   reconstructions were performed.    FINDINGS:    CENTRAL ARTERIAL SYSTEM:    ABDOMINAL AORTA: Moderate to severe plaque in the abdominal aorta without   aneurysm or occlusion. Moderate to severe ostial plaque throughout the   abdominal aortic branch vessels with at least some degree of stenosis   involving the celiac artery and left renal artery.    Celiac: As above  SMA: Normal.  AUNDREA: Normal.  Renal Arteries: As above    RIGHT LOWER EXTREMITY:    Common Iliac artery: Normal caliber patent.  External Iliac: Normal caliber patent.  Internal Iliac: Normal caliber patent.    Common femoral: Normal caliber patent.  SFA: Moderate to severe multifocal plaque with areas of multifocal   stenoses. No occlusion.  Profunda: Normal.    Popliteal: In-stent stenosis versus occlusion is difficult to evaluate.  Anterior Tibial (AT): Diffusely calcified limiting evaluation.  Tibioperoneal trunk (TPT): Diffusely diseased but patent.  Posterior Tibial (PT): Diffusely calcified limiting evaluation.  Peroneal: Diffusely calcified limiting evaluation.  Dorsalis Pedis (DP): Diffusely attenuated but patent.  Plantar: Diffusely calcified limiting evaluation.    LEFT LOWER EXTREMITY:    Common Iliac artery: Normal caliber patent.  External Iliac: Normal caliber patent.  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    Common femoral: Moderate plaque and stenosis without occlusion.  SFA: Long segment occlusion from proximal to distal  Profunda: Patent.    Popliteal: Occluded.  AT: Reconstituted proximally but diffusely calcified limiting evaluation   mid to distally.  TPT: Reconstituted but diffusely diseased.  PT: Diffusely calcified limiting evaluation.  Peroneal: Diffusely calcified limitingevaluation.  DP: Diffusely calcified limiting evaluation.  Plantar: Diffusely calcified limiting evaluation.    ADDITIONAL FINDINGS: Moderate right and small left pleural effusion with   bibasilar atelectasis. Micra pacemaker device. Aortic valve replacement.   The liver, spleen, pancreas, and biliary tree are normal. Small   gallstones. Bilateral renal cortical atrophy. No hydronephrosis. The   remainder of the retroperitoneum is normal. No pelvic abnormalities are   seen. No bowel-related abnormality. Normal appendix. Small ascites.   Diffuse body wall edema compatible with anasarca.    IMPRESSION:  *  AORTOILIAC INFLOW: Moderate to severe plaque in the abdominal aorta   without aneurysm or occlusion. At least some degree of ostial stenosis  involving the celiac artery and left renal artery. No significant iliac   stenosis.  *  RIGHT LEG: Multifocal stenoses involving the right SFA. Severe   in-stent stenosis versus occlusion of the right popliteal artery. Below   the knee runoff is diffusely calcified limiting evaluation. Arterial   Doppler may be helpful in this regard.  *  LEFT LEG: Long segment left SFA occlusion from proximal to distal.   Popliteal artery also occluded. Anterior tibial is reconstituted   approximately but diffusely calcified throughout the remainder limiting   evaluation. The remainder of the below the knee runoff is diffusely   calcified limiting evaluation. Again, arterial Doppler may be helpful in   this regard.    BENJAMIN EM MD; Attending Radiologist  This document has been electronically signed. 2022  3:49PM    VA Physiol Extremity Lower 3+ Level, BI (22 @ 15:30)   ACC: [id number] EXAM:  US PHYSIOL LWR EXT 3+ LEV BI                          PROCEDURE DATE:  2022      INTERPRETATION:  Clinical Information: Peripheral vascular disease. Left   femoropopliteal bypass occlusion.    Technique: Bilateral lower extremity ABIs/PVR    Comparison: CTA runoff 2022    Findings/  Impression:  Right lower extremity: The ankle brachial index is 1.26. TBI is 0.92 with   digital pressures of 120 mmHg. The pulse waveforms are diminished in   amplitude and pulsatility in the foot.    Left lower extremity: The ankle brachial index is unobtainable. The   patient was unable to tolerate compression. The pulse waveforms are   diffusely flattened below the knee.  Hemoglobin: 9.1 g/dL (.13.22 @ 05:10)   Historical Values  Hemoglobin: 9.1 g/dL (.13.22 @ 05:10)   Hemoglobin: 9.4 g/dL (.12.22 @ 06:39)   Hemoglobin: 9.0 g/dL (.12.22 @ 01:31)   Hemoglobin: 9.1 g/dL (.11.22 @ 06:18) Potassium, Serum: 3.9 mmol/L (.13.22 @ 05:54)   Historical Values  Potassium, Serum: 3.9 mmol/L (.13.22 @ 05:54)   Potassium, Serum: 4.0 mmol/L (.12.22 @ 06:39)   Potassium, Serum: 4.1 mmol/L (.10.22 @ 07:14)   Potassium, Serum: 4.0 mmol/L (04.08.22 @ 06:37) ESRD - HD,     DM - 2 , Severe Neuropathy,     AVM - GIB in the past ( EGD Normal )     Stable on HD,     AC per Mar Giron & Yas,     D/W Dr. Sorenson & Daughter,     Family  Prefer Kevin,     86 yo M with rest pain, Ischemic left lower extremity, chronic. Secondary to failed distal bypass graft. S/P LLE diagnostic angio on  with occluded SFA and pop    - On  heparin ggt at a low dose with no bolus after labs are obtained (History of GI  Bleed - AVM ),     - TTE, 72hr Cardiac Monitor,     - Plan for LLE : Distal Vascular bypass Today

## 2022-04-14 NOTE — PROGRESS NOTE ADULT - SUBJECTIVE AND OBJECTIVE BOX
[facility]                                   [address] [place] [address]                                                      PHONE: [phone]                                                         FAX: [phone]  [email or web address]  ---------------------------------------------------------------------------------------------------------------------------------    Overnight events/patient complaints:  NAD feeling ok today     Denies any chest pain or dyspnea     Plavix (Hives)  Toprol-XL (Rash)    MEDICATIONS  (STANDING):  acetaminophen   IVPB .. 1000 milliGRAM(s) IV Intermittent every 6 hours  aspirin  chewable 81 milliGRAM(s) Oral daily  atorvastatin 80 milliGRAM(s) Oral at bedtime  DULoxetine 60 milliGRAM(s) Oral daily  epoetin juan-epbx (RETACRIT) Injectable 13411 Unit(s) IV Push <User Schedule>  heparin  Infusion.  Unit(s)/Hr (12 mL/Hr) IV Continuous <Continuous>  isosorbide   mononitrate ER Tablet (IMDUR) 30 milliGRAM(s) Oral daily  melatonin 3 milliGRAM(s) Oral at bedtime  multivitamin 1 Tablet(s) Oral daily  NIFEdipine XL 90 milliGRAM(s) Oral daily  pantoprazole    Tablet 40 milliGRAM(s) Oral before breakfast  sevelamer carbonate 800 milliGRAM(s) Oral three times a day  torsemide 20 milliGRAM(s) Oral daily    MEDICATIONS  (PRN):  aluminum hydroxide/magnesium hydroxide/simethicone Suspension 30 milliLiter(s) Oral every 8 hours PRN Dyspepsia  ketorolac   Injectable 15 milliGRAM(s) IV Push every 8 hours PRN Severe Pain (7 - 10)  ondansetron Injectable 4 milliGRAM(s) IV Push every 6 hours PRN Nausea      Vital Signs Last 24 Hrs  T(C): 36.7 (14 Apr 2022 05:52), Max: 37.6 (13 Apr 2022 18:35)  T(F): 98 (14 Apr 2022 05:52), Max: 99.7 (13 Apr 2022 18:35)  HR: 68 (14 Apr 2022 05:52) (56 - 78)  BP: 146/70 (14 Apr 2022 05:52) (117/54 - 185/90)  BP(mean): 75 (13 Apr 2022 21:30) (69 - 94)  RR: 18 (14 Apr 2022 05:52) (13 - 20)  SpO2: 98% (14 Apr 2022 05:52) (96% - 100%)  ICU Vital Signs Last 24 Hrs  CHRISTIANO ELIZABETH  I&O's Detail    13 Apr 2022 07:01  -  14 Apr 2022 07:00  --------------------------------------------------------  IN:    Heparin Infusion: 96 mL  Total IN: 96 mL    OUT:  Total OUT: 0 mL    Total NET: 96 mL        I&O's Summary    13 Apr 2022 07:01  -  14 Apr 2022 07:00  --------------------------------------------------------  IN: 96 mL / OUT: 0 mL / NET: 96 mL      Drug Dosing Weight  CHRISTIANO ELIZABETH      PHYSICAL EXAM:  General: Appears well developed, alert and cooperative.  HEENT: Head; normocephalic, atraumatic.  Eyes: Pupils reactive, cornea wnl.  Neck: Supple, no nodes adenopathy, no NVD or carotid bruit or thyromegaly.  CARDIOVASCULAR: Normal S1 and S2, 2/6 murmur, rub, gallop or lift.   LUNGS: Decrease BS at the lower bases   ABDOMEN: Soft, nontender without mass or organomegaly. bowel sounds normoactive.  EXTREMITIES: No clubbing, cyanosis or edema  SKIN: warm and dry with normal turgor.  NEURO: Alert/oriented x 3  PSYCH: normal affect.        LABS:                        8.2    10.07 )-----------( 254      ( 14 Apr 2022 06:04 )             26.4     04-13    136  |  96<L>  |  25.2<H>  ----------------------------<  115<H>  4.7   |  25.0  |  3.10<H>    Ca    8.7      13 Apr 2022 19:21  Phos  3.9     04-13  Mg     1.7     04-13      CHRISTIANO PROETTA      PTT - ( 14 Apr 2022 06:04 )  PTT:67.0 sec      RADIOLOGY & ADDITIONAL STUDIES:    INTERPRETATION OF TELEMETRY (personally reviewed):      < from: TTE Echo Complete w/ Contrast w/ Doppler (04.08.22 @ 10:10) >    Summary:   1. Left ventricular ejection fraction, by visual estimation, is 60 to   65%.   2. Normal global left ventricular systolic function.  3. Moderate to severe left atrial enlargement.   4. Abnormal septal motion consistent with post-operative status.   5. Mildly increased LV wall thickness.   6. Mild to moderately enlarged right atrium.   7. Moderate mitral valve regurgitation.   8. Thickening and calcification of the anterior and posterior mitral   valve leaflets.   9. Transmitral mean gradient is 13.4 mmHg at HR 89 bpm.  10. Mild-moderate tricuspid regurgitation.  11. 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 Gradients suggestive of normally   functioning prosthetic valve. No significant change from prior study   dated 11/2021.  12. Estimated pulmonary artery systolic pressure is 66.4 mmHg assuming a   right atrial pressure of 3 mmHg, which is consistent with severe   pulmonary hypertension.  13. Compared to prior TTE study dated 11/2021, no significant interval   changes have occurred.    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

## 2022-04-14 NOTE — PROGRESS NOTE ADULT - ASSESSMENT
This is a 88 yo M with rest pain, Ischemic left lower extremity, chronic. Secondary to failed distal bypass graft. S/P LLE diagnostic angio on 4/11 with occluded SFA and pop, now POD#1 s/p L fem to AT bypass with cryovein    - Bedrest for 24hrs  -Monitor urine output, carrera in place  -Pain control  -Neurovascular checks every 4hrs  -Heparin drip

## 2022-04-14 NOTE — PROGRESS NOTE ADULT - SUBJECTIVE AND OBJECTIVE BOX
Progress note/POC note    INTERVAL HPI/OVERNIGHT EVENTS:  Patient seen and examined overnight  s/p left fem-AT bypass with cryovein, tolerated procedure well  c/o LLE postop pain, ACE wrap loosen   palpable L fem pulse, monophasic AT signal  AVSS    MEDICATIONS  (STANDING):  acetaminophen   IVPB .. 1000 milliGRAM(s) IV Intermittent every 6 hours  aspirin  chewable 81 milliGRAM(s) Oral daily  atorvastatin 80 milliGRAM(s) Oral at bedtime  DULoxetine 60 milliGRAM(s) Oral daily  epoetin juan-epbx (RETACRIT) Injectable 98461 Unit(s) IV Push <User Schedule>  heparin  Infusion.  Unit(s)/Hr (12 mL/Hr) IV Continuous <Continuous>  isosorbide   mononitrate ER Tablet (IMDUR) 30 milliGRAM(s) Oral daily  melatonin 3 milliGRAM(s) Oral at bedtime  multivitamin 1 Tablet(s) Oral daily  NIFEdipine XL 90 milliGRAM(s) Oral daily  pantoprazole    Tablet 40 milliGRAM(s) Oral before breakfast  sevelamer carbonate 800 milliGRAM(s) Oral three times a day  torsemide 20 milliGRAM(s) Oral daily    MEDICATIONS  (PRN):  aluminum hydroxide/magnesium hydroxide/simethicone Suspension 30 milliLiter(s) Oral every 8 hours PRN Dyspepsia  ketorolac   Injectable 15 milliGRAM(s) IV Push every 8 hours PRN Severe Pain (7 - 10)  ondansetron Injectable 4 milliGRAM(s) IV Push every 6 hours PRN Nausea      Vital Signs Last 24 Hrs  T(C): 36.9 (13 Apr 2022 22:28), Max: 37.6 (13 Apr 2022 18:35)  T(F): 98.5 (13 Apr 2022 22:28), Max: 99.7 (13 Apr 2022 18:35)  HR: 65 (13 Apr 2022 22:28) (56 - 78)  BP: 139/57 (13 Apr 2022 22:28) (117/54 - 185/90)  BP(mean): 75 (13 Apr 2022 21:30) (69 - 94)  RR: 18 (13 Apr 2022 22:28) (13 - 20)  SpO2: 99% (13 Apr 2022 22:28) (96% - 100%)    Physical Exam:    Neurological:  No focal sensory/motor deficits, Alert and oriented to person only  Respiratory: Breath Sounds equal & clear to auscultation, no accessory muscle use  Cardiovascular: Regular rate & rhythm, normal S1, S2; no murmurs, gallops or rubs  Gastrointestinal: Soft, non-tender, normal bowel sounds  Extremities: LLE surgical dressing in place, c/d   Vascular: palpable L fem pulse, monophasic AT signal, L foot rubor unchanged from preop  Skin: No rashes    I&O's Detail    12 Apr 2022 07:01  -  13 Apr 2022 07:00  --------------------------------------------------------  IN:  Total IN: 0 mL    OUT:    Other (mL): 1000 mL  Total OUT: 1000 mL    Total NET: -1000 mL      LABS:                        8.5    10.18 )-----------( 249      ( 13 Apr 2022 19:21 )             27.6     04-13    136  |  96<L>  |  25.2<H>  ----------------------------<  115<H>  4.7   |  25.0  |  3.10<H>    Ca    8.7      13 Apr 2022 19:21  Phos  3.9     04-13  Mg     1.7     04-13      PTT - ( 13 Apr 2022 05:10 )  PTT:76.7 sec

## 2022-04-15 NOTE — PROGRESS NOTE ADULT - SUBJECTIVE AND OBJECTIVE BOX
Overnight events/patient complaints:    NAD feeling ok today     Denies any chest pain or dyspnea     Plavix (Hives)  Toprol-XL (Rash)    MEDICATIONS  (STANDING):  acetaminophen   IVPB .. 1000 milliGRAM(s) IV Intermittent every 6 hours  aspirin  chewable 81 milliGRAM(s) Oral daily  atorvastatin 80 milliGRAM(s) Oral at bedtime  DULoxetine 60 milliGRAM(s) Oral daily  epoetin juan-epbx (RETACRIT) Injectable 65244 Unit(s) IV Push <User Schedule>  heparin  Infusion.  Unit(s)/Hr (12 mL/Hr) IV Continuous <Continuous>  isosorbide   mononitrate ER Tablet (IMDUR) 30 milliGRAM(s) Oral daily  melatonin 3 milliGRAM(s) Oral at bedtime  multivitamin 1 Tablet(s) Oral daily  NIFEdipine XL 90 milliGRAM(s) Oral daily  pantoprazole    Tablet 40 milliGRAM(s) Oral before breakfast  sevelamer carbonate 800 milliGRAM(s) Oral three times a day  torsemide 20 milliGRAM(s) Oral daily    MEDICATIONS  (PRN):  aluminum hydroxide/magnesium hydroxide/simethicone Suspension 30 milliLiter(s) Oral every 8 hours PRN Dyspepsia  ketorolac   Injectable 15 milliGRAM(s) IV Push every 8 hours PRN Severe Pain (7 - 10)  ondansetron Injectable 4 milliGRAM(s) IV Push every 6 hours PRN Nausea    Vital Signs Last 24 Hrs,    T(C): 36.7 (14 Apr 2022 05:52), Max: 37.6 (13 Apr 2022 18:35)  T(F): 98 (14 Apr 2022 05:52), Max: 99.7 (13 Apr 2022 18:35)  HR: 68 (14 Apr 2022 05:52) (56 - 78)  BP: 146/70 (14 Apr 2022 05:52) (117/54 - 185/90)  BP(mean): 75 (13 Apr 2022 21:30) (69 - 94)  RR: 18 (14 Apr 2022 05:52) (13 - 20)  SpO2: 98% (14 Apr 2022 05:52) (96% - 100%)    13 Apr 2022 07:01  -  14 Apr 2022 07:00  --------------------------------------------------------  IN:    Heparin Infusion: 96 mL  Total IN: 96 mL    OUT:  Total OUT: 0 mL    Total NET: 96 mL    I&O's Summary    13 Apr 2022 07:01  -  14 Apr 2022 07:00  --------------------------------------------------------  IN: 96 mL / OUT: 0 mL / NET: 96 mL    PHYSICAL EXAM:  General: Appears well developed, alert and cooperative.  HEENT: Head; normocephalic, atraumatic.  Eyes: Pupils reactive, cornea wnl.  Neck: Supple, no nodes adenopathy, no NVD or carotid bruit or thyromegaly.  CARDIOVASCULAR: Normal S1 and S2, 2/6 murmur, rub, gallop or lift.   LUNGS: Decrease BS at the lower bases   ABDOMEN: Soft, nontender without mass or organomegaly. bowel sounds normoactive.  EXTREMITIES: No clubbing, cyanosis or edema  SKIN: warm and dry with normal turgor.  NEURO: Alert/oriented x 3  PSYCH: normal affect.        LABS:                        8.2    10.07 )-----------( 254      ( 14 Apr 2022 06:04 )             26.4     04-13    136  |  96<L>  |  25.2<H>  ----------------------------<  115<H>  4.7   |  25.0  |  3.10<H>    Ca    8.7      13 Apr 2022 19:21  Phos  3.9     04-13  Mg     1.7     04-13      CHRISTIANO ELIZABETH      PTT - ( 14 Apr 2022 06:04 )  PTT:67.0 sec      RADIOLOGY & ADDITIONAL STUDIES:    INTERPRETATION OF TELEMETRY (personally reviewed):    TTE Echo Complete w/ Contrast w/ Doppler (04.08.22 @ 10:10)     Summary:   1. Left ventricular ejection fraction, by visual estimation, is 60 to   65%.   2. Normal global left ventricular systolic function.  3. Moderate to severe left atrial enlargement.   4. Abnormal septal motion consistent with post-operative status.   5. Mildly increased LV wall thickness.   6. Mild to moderately enlarged right atrium.   7. Moderate mitral valve regurgitation.   8. Thickening and calcification of the anterior and posterior mitral   valve leaflets.   9. Transmitral mean gradient is 13.4 mmHg at HR 89 bpm.  10. Mild-moderate tricuspid regurgitation.  11. 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 Gradients suggestive of normally   functioning prosthetic valve. No significant change from prior study   dated 11/2021.  12. Estimated pulmonary artery systolic pressure is 66.4 mmHg assuming a   right atrial pressure of 3 mmHg, which is consistent with severe   pulmonary hypertension.  13. Compared to prior TTE study dated 11/2021, no significant interval   changes have occurred.    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

## 2022-04-15 NOTE — PROGRESS NOTE ADULT - SUBJECTIVE AND OBJECTIVE BOX
Patient was seen and evaluated on dialysis  Patient is tolerating the procedure well  T(C): 36.8 (04-15-22 @ 13:05), Max: 36.8 (04-15-22 @ 13:05)  HR: 56 (04-15-22 @ 13:05) (56 - 76)  BP: 111/50 (04-15-22 @ 13:05) (111/50 - 159/57)  Continue dialysis:   Dialyzer: Revaclear 300   QB: 400 ml  QD: 500ml  Goal UF 2 kg over 3.5  Hours      Patient was seen and evaluated on dialysis  Patient is tolerating the procedure well  T(C): 36.8 (04-15-22 @ 13:05), Max: 36.8 (04-15-22 @ 13:05)  HR: 56 (04-15-22 @ 13:05) (56 - 76)  BP: 111/50 (04-15-22 @ 13:05) (111/50 - 159/57)  Continue dialysis:   Dialyzer: Revaclear 300   QB: 400 ml  QD: 500ml  Goal UF 2 kg over 3.5  Hours   Hb low, 2 U PRBC with HD     syncope

## 2022-04-15 NOTE — CHART NOTE - NSCHARTNOTEFT_GEN_A_CORE
Source: Patient [x ]  Family [ ]   other [ ]    Current Diet: Diet, Renal Restrictions:   For patients receiving Renal Replacement - No Protein Restr, No Conc K, No Conc Phos, Low Sodium (04-14-22 @ 17:10)    PO intake:  50% [x ]   50-75%  [ ]   %  [ ]  other :    Current Weight:   (4/15) 155.6 lbs  (4/6) 149 lbs    % Weight Change - wt fluctuation noted, will conitnue to monitor for accuracy.  No edema noted.     Pertinent Medications: MEDICATIONS  (STANDING):  aspirin  chewable 81 milliGRAM(s) Oral daily  atorvastatin 80 milliGRAM(s) Oral at bedtime  ceFAZolin   IVPB 1000 milliGRAM(s) IV Intermittent every 8 hours  ceFAZolin   IVPB      chlorhexidine 2% Cloths 1 Application(s) Topical <User Schedule>  DULoxetine 60 milliGRAM(s) Oral daily  epoetin juan-epbx (RETACRIT) Injectable 36207 Unit(s) IV Push <User Schedule>  heparin  Infusion. 1300 Unit(s)/Hr (12 mL/Hr) IV Continuous <Continuous>  isosorbide   mononitrate ER Tablet (IMDUR) 30 milliGRAM(s) Oral daily  melatonin 3 milliGRAM(s) Oral at bedtime  multivitamin 1 Tablet(s) Oral daily  NIFEdipine XL 90 milliGRAM(s) Oral daily  pantoprazole    Tablet 40 milliGRAM(s) Oral before breakfast  sevelamer carbonate 800 milliGRAM(s) Oral three times a day  torsemide 20 milliGRAM(s) Oral daily    MEDICATIONS  (PRN):  acetaminophen     Tablet .. 975 milliGRAM(s) Oral every 6 hours PRN Mild Pain (1 - 3)  aluminum hydroxide/magnesium hydroxide/simethicone Suspension 30 milliLiter(s) Oral every 8 hours PRN Dyspepsia  ketorolac   Injectable 15 milliGRAM(s) IV Push every 8 hours PRN Severe Pain (7 - 10)  ondansetron Injectable 4 milliGRAM(s) IV Push every 6 hours PRN Nausea    Pertinent Labs: CBC Full  -  ( 15 Apr 2022 04:17 )  WBC Count : 8.07 K/uL  RBC Count : 2.43 M/uL  Hemoglobin : 7.6 g/dL  Hematocrit : 25.3 %  Platelet Count - Automated : 212 K/uL  Mean Cell Volume : 104.1 fl  Mean Cell Hemoglobin : 31.3 pg  Mean Cell Hemoglobin Concentration : 30.0 gm/dL  04-15 Na132 mmol/L<L> Glu 110 mg/dL<H> K+ 5.4 mmol/L<H> Cr  4.47 mg/dL<H> BUN 41.4 mg/dL<H> Phos 6.6 mg/dL<H> Alb n/a   PAB n/a       Skin: sx wound left leg    Nutrition focused physical exam previously conducted - found signs of malnutrition [ ]absent [x ]present    Subcutaneous fat loss: [c ] Orbital fat pads region, [c ]Buccal fat region, [ ]Triceps region,  [ ]Ribs region    Muscle wasting: [ c]Temples region, [c ]Clavicle region, [c ]Shoulder region, [ ]Scapula region, [ ]Interosseous region,  [ ]thigh region, [ ]Calf region    Current Nutrition Diagnosis: Pt remains at nutrition risk secondary to severe protein calorie malnutrition (chronic) related to inability to consume sufficient protein energy intake in setting of extensive medical hx and multiple hospitalizations  as evidenced by pt with severe muscle wasting/fat loss; likely meeting <75% estimated energy intake > 1 month.   Pt is now POD#2 s/p L fem to AT bypass with cryovein c/b acute graft rethrombosis, now s/p thrombectomy.  Pt reports good po intake at meals, however consumes small portions; eating breakfast right now.  Pt without any complaints at this time.  RD to remain available.     Recommendations:   1. RX: Nepro BID  2. Change MVI to Nepro-pito daily  3. Obtain daily weight and monitor trend     Monitoring and Evaluation:   [x ] PO intake [x ] Tolerance to diet prescription [X] Weights  [X] Follow up per protocol [X] Labs:

## 2022-04-15 NOTE — PROGRESS NOTE ADULT - ASSESSMENT
Assessment:   88yo Male     Plan:   -    Assessment: 86 yo M with rest pain, Ischemic left lower extremity, chronic. Secondary to failed distal bypass graft. S/P LLE diagnostic angio on 4/11 with occluded SFA and pop, now POD#2 s/p L fem to AT bypass with cryovein c/b acute graft rethrombosis, now s/p thrombectomy.     Plan:   - Cont with knee immobilizer  - Renal diet   Assessment: 86 yo M with rest pain, Ischemic left lower extremity, chronic. Secondary to failed distal bypass graft. S/P LLE diagnostic angio on 4/11 with occluded SFA and pop, now POD#2 s/p L fem to AT bypass with cryovein c/b acute graft rethrombosis, now s/p thrombectomy.     Plan:   - Cont with knee immobilizer  - Renal diet  - heparin gtt

## 2022-04-15 NOTE — PROGRESS NOTE ADULT - SUBJECTIVE AND OBJECTIVE BOX
ACUTE CARE SURGERY PROGRESS NOTE    Subjective:   Patient examined at bedside this AM. Reports     Objective:  Vital Signs  T(C): 36.6 (04-14 @ 21:57), Max: 36.9 (04-14 @ 12:22)  HR: 76 (04-14 @ 21:57) (62 - 76)  BP: 149/64 (04-14 @ 21:57) (136/47 - 173/50)  RR: 16 (04-14 @ 21:57) (13 - 20)  SpO2: 97% (04-14 @ 21:57) (97% - 100%)  04-14-22 @ 07:01  -  04-15-22 @ 04:13  --------------------------------------------------------  IN:  Total IN: 0 mL    OUT:    Voided (mL): 100 mL  Total OUT: 100 mL    Total NET: -100 mL          Physical Exam:  General: alert and oriented, NAD  Resp: airway patent, respirations unlabored  CVS: regular rate and rhythm  Abdomen: soft, nontender, nondistended  Extremities: no edema  Skin: warm, dry, appropriate color    Labs:                        8.2    10.07 )-----------( 254      ( 14 Apr 2022 06:04 )             26.4   04-13    136  |  96<L>  |  25.2<H>  ----------------------------<  115<H>  4.7   |  25.0  |  3.10<H>    Ca    8.7      13 Apr 2022 19:21  Phos  3.9     04-13  Mg     1.7     04-13      CAPILLARY BLOOD GLUCOSE          Medications:   MEDICATIONS  (STANDING):  aspirin  chewable 81 milliGRAM(s) Oral daily  atorvastatin 80 milliGRAM(s) Oral at bedtime  ceFAZolin   IVPB      ceFAZolin   IVPB 1000 milliGRAM(s) IV Intermittent every 8 hours  chlorhexidine 2% Cloths 1 Application(s) Topical <User Schedule>  DULoxetine 60 milliGRAM(s) Oral daily  epoetin juan-epbx (RETACRIT) Injectable 31992 Unit(s) IV Push <User Schedule>  heparin  Infusion. 1300 Unit(s)/Hr (12 mL/Hr) IV Continuous <Continuous>  isosorbide   mononitrate ER Tablet (IMDUR) 30 milliGRAM(s) Oral daily  melatonin 3 milliGRAM(s) Oral at bedtime  multivitamin 1 Tablet(s) Oral daily  NIFEdipine XL 90 milliGRAM(s) Oral daily  pantoprazole    Tablet 40 milliGRAM(s) Oral before breakfast  sevelamer carbonate 800 milliGRAM(s) Oral three times a day  torsemide 20 milliGRAM(s) Oral daily    MEDICATIONS  (PRN):  acetaminophen     Tablet .. 975 milliGRAM(s) Oral every 6 hours PRN Mild Pain (1 - 3)  aluminum hydroxide/magnesium hydroxide/simethicone Suspension 30 milliLiter(s) Oral every 8 hours PRN Dyspepsia  ketorolac   Injectable 15 milliGRAM(s) IV Push every 8 hours PRN Severe Pain (7 - 10)  ondansetron Injectable 4 milliGRAM(s) IV Push every 6 hours PRN Nausea       VASCULAR SURGERY PROGRESS & POST OP NOTE    Subjective: Patient examined at bedside this AM. Reports he is feeling better. Has been afebrile. No acute events overnight    Objective:  Vital Signs  T(C): 36.6 (04-14 @ 21:57), Max: 36.9 (04-14 @ 12:22)  HR: 76 (04-14 @ 21:57) (62 - 76)  BP: 149/64 (04-14 @ 21:57) (136/47 - 173/50)  RR: 16 (04-14 @ 21:57) (13 - 20)  SpO2: 97% (04-14 @ 21:57) (97% - 100%)  04-14-22 @ 07:01  -  04-15-22 @ 04:13  --------------------------------------------------------  IN:  Total IN: 0 mL    OUT:    Voided (mL): 100 mL  Total OUT: 100 mL    Total NET: -100 mL      Physical Exam:  General: alert and oriented  Resp: airway patent, respirations unlabored  Extremities: no edema, bypass palpable at knee, strong PT/DP and weaker AT signals  Skin: warm, dry, appropriate color    Labs:                        8.2    10.07 )-----------( 254      ( 14 Apr 2022 06:04 )             26.4   04-13    136  |  96<L>  |  25.2<H>  ----------------------------<  115<H>  4.7   |  25.0  |  3.10<H>    Ca    8.7      13 Apr 2022 19:21  Phos  3.9     04-13  Mg     1.7     04-13      CAPILLARY BLOOD GLUCOSE          Medications:   MEDICATIONS  (STANDING):  aspirin  chewable 81 milliGRAM(s) Oral daily  atorvastatin 80 milliGRAM(s) Oral at bedtime  ceFAZolin   IVPB      ceFAZolin   IVPB 1000 milliGRAM(s) IV Intermittent every 8 hours  chlorhexidine 2% Cloths 1 Application(s) Topical <User Schedule>  DULoxetine 60 milliGRAM(s) Oral daily  epoetin juan-epbx (RETACRIT) Injectable 68896 Unit(s) IV Push <User Schedule>  heparin  Infusion. 1300 Unit(s)/Hr (12 mL/Hr) IV Continuous <Continuous>  isosorbide   mononitrate ER Tablet (IMDUR) 30 milliGRAM(s) Oral daily  melatonin 3 milliGRAM(s) Oral at bedtime  multivitamin 1 Tablet(s) Oral daily  NIFEdipine XL 90 milliGRAM(s) Oral daily  pantoprazole    Tablet 40 milliGRAM(s) Oral before breakfast  sevelamer carbonate 800 milliGRAM(s) Oral three times a day  torsemide 20 milliGRAM(s) Oral daily    MEDICATIONS  (PRN):  acetaminophen     Tablet .. 975 milliGRAM(s) Oral every 6 hours PRN Mild Pain (1 - 3)  aluminum hydroxide/magnesium hydroxide/simethicone Suspension 30 milliLiter(s) Oral every 8 hours PRN Dyspepsia  ketorolac   Injectable 15 milliGRAM(s) IV Push every 8 hours PRN Severe Pain (7 - 10)  ondansetron Injectable 4 milliGRAM(s) IV Push every 6 hours PRN Nausea

## 2022-04-16 NOTE — PROGRESS NOTE ADULT - SUBJECTIVE AND OBJECTIVE BOX
Acute Care Surgery/Trauma Surgery Progress Note:    Patient experienced a disorganized behavior over night. Patient afebrile, and VSS. Refer pain on the left lower extremity on examination. Tolerating diet. Denies n/v/f/c/cp/sob. Patient is confused. but oriented in self.    Diet, Renal Restrictions:   For patients receiving Renal Replacement - No Protein Restr, No Conc K, No Conc Phos, Low Sodium (04-14-22 @ 17:10)      Scheduled Medications:   aspirin  chewable 81 milliGRAM(s) Oral daily  atorvastatin 80 milliGRAM(s) Oral at bedtime  ceFAZolin   IVPB 1000 milliGRAM(s) IV Intermittent every 24 hours  chlorhexidine 2% Cloths 1 Application(s) Topical <User Schedule>  DULoxetine 60 milliGRAM(s) Oral daily  epoetin juan-epbx (RETACRIT) Injectable 39250 Unit(s) IV Push <User Schedule>  heparin  Infusion. 1300 Unit(s)/Hr (12 mL/Hr) IV Continuous <Continuous>  isosorbide   mononitrate ER Tablet (IMDUR) 30 milliGRAM(s) Oral daily  melatonin 3 milliGRAM(s) Oral at bedtime  melatonin 5 milliGRAM(s) Oral at bedtime  multivitamin 1 Tablet(s) Oral daily  NIFEdipine XL 90 milliGRAM(s) Oral daily  pantoprazole    Tablet 40 milliGRAM(s) Oral before breakfast  sevelamer carbonate 800 milliGRAM(s) Oral three times a day  torsemide 20 milliGRAM(s) Oral daily    PRN Medications:  acetaminophen     Tablet .. 975 milliGRAM(s) Oral every 6 hours PRN Mild Pain (1 - 3)  aluminum hydroxide/magnesium hydroxide/simethicone Suspension 30 milliLiter(s) Oral every 8 hours PRN Dyspepsia  ketorolac   Injectable 15 milliGRAM(s) IV Push every 8 hours PRN Severe Pain (7 - 10)  ondansetron Injectable 4 milliGRAM(s) IV Push every 6 hours PRN Nausea      Objective:   T(F): 97.5 (04-15 @ 17:43), Max: 98.2 (04-15 @ 13:05)  HR: 58 (04-15 @ 21:00) (55 - 73)  BP: 105/47 (04-15 @ 21:00) (103/66 - 136/68)  BP(mean): 78 (04-15 @ 17:43) (76 - 78)  ABP: --  ABP(mean): --  RR: 19 (04-15 @ 21:00) (16 - 20)  SpO2: 100% (04-15 @ 21:00) (97% - 100%)      Physical Exam:   GEN: patient resting comfortably in bed, in no acute distress  RESP: respirations are unlabored, no accessory muscle use, no conversational dyspnea  CVS: RRR  GI: Abdomen soft, non-tender, non-distended, no rebound tenderness / guarding  LLE:  Palpable dorsalis pedis and posterior tibialis of the left lower extremity.    I&O's    04-14 @ 07:01  -  04-15 @ 07:00  --------------------------------------------------------  IN:    Heparin Infusion: 36 mL    Heparin Infusion: 168 mL    Oral Fluid: 300 mL  Total IN: 504 mL    OUT:    Voided (mL): 400 mL  Total OUT: 400 mL    Total NET: 104 mL      04-15 @ 07:01  -  04-16 @ 02:19  --------------------------------------------------------  IN:    Heparin Infusion: 201 mL    Oral Fluid: 720 mL  Total IN: 921 mL    OUT:    Other (mL): 1700 mL  Total OUT: 1700 mL    Total NET: -779 mL          LABS:                        7.6    8.07  )-----------( 212      ( 15 Apr 2022 04:17 )             25.3     04-15    132<L>  |  92<L>  |  41.4<H>  ----------------------------<  110<H>  5.4<H>   |  23.0  |  4.47<H>    Ca    8.5<L>      15 Apr 2022 04:17  Phos  6.6     04-15  Mg     1.8     04-15      PTT - ( 15 Apr 2022 20:31 )  PTT:88.7 sec      MICROBIOLOGY:       PATHOLOGY:       Acute Care Surgery/Vascullar Surgery Progress Note:    Patient experienced a disorganized behavior over night. Patient afebrile, and VSS. Refer pain on the left lower extremity on examination. Tolerating diet. Denies n/v/f/c/cp/sob. Patient is confused. but oriented in self.    Diet, Renal Restrictions:   For patients receiving Renal Replacement - No Protein Restr, No Conc K, No Conc Phos, Low Sodium (04-14-22 @ 17:10)      Scheduled Medications:   aspirin  chewable 81 milliGRAM(s) Oral daily  atorvastatin 80 milliGRAM(s) Oral at bedtime  ceFAZolin   IVPB 1000 milliGRAM(s) IV Intermittent every 24 hours  chlorhexidine 2% Cloths 1 Application(s) Topical <User Schedule>  DULoxetine 60 milliGRAM(s) Oral daily  epoetin juan-epbx (RETACRIT) Injectable 82742 Unit(s) IV Push <User Schedule>  heparin  Infusion. 1300 Unit(s)/Hr (12 mL/Hr) IV Continuous <Continuous>  isosorbide   mononitrate ER Tablet (IMDUR) 30 milliGRAM(s) Oral daily  melatonin 3 milliGRAM(s) Oral at bedtime  melatonin 5 milliGRAM(s) Oral at bedtime  multivitamin 1 Tablet(s) Oral daily  NIFEdipine XL 90 milliGRAM(s) Oral daily  pantoprazole    Tablet 40 milliGRAM(s) Oral before breakfast  sevelamer carbonate 800 milliGRAM(s) Oral three times a day  torsemide 20 milliGRAM(s) Oral daily    PRN Medications:  acetaminophen     Tablet .. 975 milliGRAM(s) Oral every 6 hours PRN Mild Pain (1 - 3)  aluminum hydroxide/magnesium hydroxide/simethicone Suspension 30 milliLiter(s) Oral every 8 hours PRN Dyspepsia  ketorolac   Injectable 15 milliGRAM(s) IV Push every 8 hours PRN Severe Pain (7 - 10)  ondansetron Injectable 4 milliGRAM(s) IV Push every 6 hours PRN Nausea      Objective:   T(F): 97.5 (04-15 @ 17:43), Max: 98.2 (04-15 @ 13:05)  HR: 58 (04-15 @ 21:00) (55 - 73)  BP: 105/47 (04-15 @ 21:00) (103/66 - 136/68)  BP(mean): 78 (04-15 @ 17:43) (76 - 78)  ABP: --  ABP(mean): --  RR: 19 (04-15 @ 21:00) (16 - 20)  SpO2: 100% (04-15 @ 21:00) (97% - 100%)      Physical Exam:   GEN: patient resting comfortably in bed, in no acute distress  RESP: respirations are unlabored, no accessory muscle use, no conversational dyspnea  CVS: RRR  GI: Abdomen soft, non-tender, non-distended, no rebound tenderness / guarding  LLE:  Palpable dorsalis pedis and posterior tibialis of the left lower extremity.    I&O's    04-14 @ 07:01  -  04-15 @ 07:00  --------------------------------------------------------  IN:    Heparin Infusion: 36 mL    Heparin Infusion: 168 mL    Oral Fluid: 300 mL  Total IN: 504 mL    OUT:    Voided (mL): 400 mL  Total OUT: 400 mL    Total NET: 104 mL      04-15 @ 07:01  -  04-16 @ 02:19  --------------------------------------------------------  IN:    Heparin Infusion: 201 mL    Oral Fluid: 720 mL  Total IN: 921 mL    OUT:    Other (mL): 1700 mL  Total OUT: 1700 mL    Total NET: -779 mL          LABS:                        7.6    8.07  )-----------( 212      ( 15 Apr 2022 04:17 )             25.3     04-15    132<L>  |  92<L>  |  41.4<H>  ----------------------------<  110<H>  5.4<H>   |  23.0  |  4.47<H>    Ca    8.5<L>      15 Apr 2022 04:17  Phos  6.6     04-15  Mg     1.8     04-15      PTT - ( 15 Apr 2022 20:31 )  PTT:88.7 sec      MICROBIOLOGY:       PATHOLOGY:

## 2022-04-16 NOTE — PROGRESS NOTE ADULT - ASSESSMENT
Assessment: 86 yo M experiencing disorganized behavior in the late afternoon characterized by pulling off all IVs and refusing taking his medications. Patient was  loudly, screaming for about 1 hour and half. Provided pain control, and one dose of haldol   Patient  is experiencing pain in the left lower extremity due to ischemia. Moreover, pt has failed distal bypass graft. S/P LLE diagnostic angio on 4/11 with occluded SFA and pop, now POD#3 s/p L fem to AT bypass with cryovein c/b acute graft rethrombosis, now s/p thrombectomy. Last night patient had a contextual appopiate conversation for about 7 min subsequently confused thoughs.    Plan:   - Cont with knee immobilizer  - Renal diet  - heparin gtt  - pain control  - Neurovascular checks of the left lower extremity: Palpable dorsalis pedis and posterior tibialis of the left lower extremity.  - Disposition pending.

## 2022-04-16 NOTE — PROGRESS NOTE ADULT - SUBJECTIVE AND OBJECTIVE BOX
Subjective: No Pain,     No acute events overnight. Patient afebrile, and VSS.     Tolerating diet.     Denies n/v/f/c/cp/sob.    MEDICATIONS  (STANDING):    aspirin  chewable 81 milliGRAM(s) Oral daily  atorvastatin 80 milliGRAM(s) Oral at bedtime  ceFAZolin   IVPB 1000 milliGRAM(s) IV Intermittent every 24 hours  chlorhexidine 2% Cloths 1 Application(s) Topical <User Schedule>  DULoxetine 60 milliGRAM(s) Oral daily  epoetin juan-epbx (RETACRIT) Injectable 33487 Unit(s) IV Push <User Schedule>  heparin  Infusion. 1300 Unit(s)/Hr (12 mL/Hr) IV Continuous <Continuous>  isosorbide   mononitrate ER Tablet (IMDUR) 30 milliGRAM(s) Oral daily  melatonin 3 milliGRAM(s) Oral at bedtime  melatonin 5 milliGRAM(s) Oral at bedtime  multivitamin 1 Tablet(s) Oral daily  NIFEdipine XL 90 milliGRAM(s) Oral daily  pantoprazole    Tablet 40 milliGRAM(s) Oral before breakfast  sevelamer carbonate 800 milliGRAM(s) Oral three times a day  tamsulosin 0.4 milliGRAM(s) Oral at bedtime  torsemide 20 milliGRAM(s) Oral daily    MEDICATIONS  (PRN):  acetaminophen     Tablet .. 975 milliGRAM(s) Oral every 6 hours PRN Mild Pain (1 - 3)  aluminum hydroxide/magnesium hydroxide/simethicone Suspension 30 milliLiter(s) Oral every 8 hours PRN Dyspepsia  ketorolac   Injectable 15 milliGRAM(s) IV Push every 8 hours PRN Severe Pain (7 - 10)  ondansetron Injectable 4 milliGRAM(s) IV Push every 6 hours PRN Nausea    Vital Signs Last 24 Hrs  T(C): 36.6 (16 Apr 2022 16:10), Max: 36.8 (16 Apr 2022 05:40)  T(F): 97.8 (16 Apr 2022 16:10), Max: 98.3 (16 Apr 2022 10:28)  HR: 59 (16 Apr 2022 16:10) (59 - 78)  BP: 113/46 (16 Apr 2022 16:10) (104/50 - 120/60)  RR: 17 (16 Apr 2022 16:10) (17 - 19)  SpO2: 95% (16 Apr 2022 13:54) (95% - 99%)      04-15  -  04-16  --------------------------------------------------------  IN:    Heparin Infusion: 313 mL    Oral Fluid: 960 mL  Total IN: 1273 mL    OUT:    Other (mL): 1700 mL  Total OUT: 1700 mL    Total NET: -427 mL      04-16 - 04-17  --------------------------------------------------------  IN:    Heparin Infusion: 112 mL    Oral Fluid: 240 mL  Total IN: 352 mL    OUT:  Total OUT: 0 mL    Total NET: 352 mL    Physical Exam:   GEN: patient resting comfortably in bed, in no acute distress  RESP: respirations are unlabored, no accessory muscle use, no conversational dyspnea  CVS: RRR  GI: Abdomen soft, non-tender, non-distended, no rebound tenderness / guarding  LLE:  Palpable dorsalis pedis and posterior tibialis of the left lower extremity.      LABS:                        8.1    7.34  )-----------( 199      ( 16 Apr 2022 07:15 )             25.4     04-16    136  |  96<L>  |  28.6<H>  ----------------------------<  108<H>  4.0   |  27.0  |  3.28<H>    Ca    8.4<L>      16 Apr 2022 07:15  Phos  3.6     04-16  Mg     1.7     04-16      PTT - ( 16 Apr 2022 07:15 )  PTT:86.8 sec    Assessment: 88 yo M with rest pain, Ischemic left lower extremity, chronic. Secondary to failed distal bypass graft. S/P LLE diagnostic angio on 4/11 with occluded SFA and pop, now POD#4 s/p L fem to AT bypass with cryovein c/b acute graft rethrombosis, now s/p thrombectomy.     Plan:   - +  Knee immobilizer  - Renal diet  - Heparin gtt  - Pain control  - Neurovascular checks of the left lower extremity:         Palpable dorsalis pedis and posterior tibialis of the left lower extremity.    - Disposition pending. PRBC Transfusion - PRN,     HD - M & F,     Remains Frail,     S/P VS,    Remains confined to Bed,     Severe Protein - Calorie malnutrition,

## 2022-04-16 NOTE — PROGRESS NOTE ADULT - SUBJECTIVE AND OBJECTIVE BOX
Subjective:    No acute events overnight. Patient afebrile, and VSS. Tolerating diet. Denies n/v/f/c/cp/sob.    MEDICATIONS  (STANDING):  aspirin  chewable 81 milliGRAM(s) Oral daily  atorvastatin 80 milliGRAM(s) Oral at bedtime  ceFAZolin   IVPB 1000 milliGRAM(s) IV Intermittent every 24 hours  chlorhexidine 2% Cloths 1 Application(s) Topical <User Schedule>  DULoxetine 60 milliGRAM(s) Oral daily  epoetin juan-epbx (RETACRIT) Injectable 27589 Unit(s) IV Push <User Schedule>  heparin  Infusion. 1300 Unit(s)/Hr (12 mL/Hr) IV Continuous <Continuous>  isosorbide   mononitrate ER Tablet (IMDUR) 30 milliGRAM(s) Oral daily  melatonin 3 milliGRAM(s) Oral at bedtime  melatonin 5 milliGRAM(s) Oral at bedtime  multivitamin 1 Tablet(s) Oral daily  NIFEdipine XL 90 milliGRAM(s) Oral daily  pantoprazole    Tablet 40 milliGRAM(s) Oral before breakfast  sevelamer carbonate 800 milliGRAM(s) Oral three times a day  tamsulosin 0.4 milliGRAM(s) Oral at bedtime  torsemide 20 milliGRAM(s) Oral daily    MEDICATIONS  (PRN):  acetaminophen     Tablet .. 975 milliGRAM(s) Oral every 6 hours PRN Mild Pain (1 - 3)  aluminum hydroxide/magnesium hydroxide/simethicone Suspension 30 milliLiter(s) Oral every 8 hours PRN Dyspepsia  ketorolac   Injectable 15 milliGRAM(s) IV Push every 8 hours PRN Severe Pain (7 - 10)  ondansetron Injectable 4 milliGRAM(s) IV Push every 6 hours PRN Nausea      Vital Signs Last 24 Hrs  T(C): 36.6 (16 Apr 2022 16:10), Max: 36.8 (16 Apr 2022 05:40)  T(F): 97.8 (16 Apr 2022 16:10), Max: 98.3 (16 Apr 2022 10:28)  HR: 59 (16 Apr 2022 16:10) (59 - 78)  BP: 113/46 (16 Apr 2022 16:10) (104/50 - 120/60)  BP(mean): --  RR: 17 (16 Apr 2022 16:10) (17 - 19)  SpO2: 95% (16 Apr 2022 13:54) (95% - 99%)      04-15  -  04-16  --------------------------------------------------------  IN:    Heparin Infusion: 313 mL    Oral Fluid: 960 mL  Total IN: 1273 mL    OUT:    Other (mL): 1700 mL  Total OUT: 1700 mL    Total NET: -427 mL      04-16 - 04-17  --------------------------------------------------------  IN:    Heparin Infusion: 112 mL    Oral Fluid: 240 mL  Total IN: 352 mL    OUT:  Total OUT: 0 mL    Total NET: 352 mL          Physical Exam:   GEN: patient resting comfortably in bed, in no acute distress  RESP: respirations are unlabored, no accessory muscle use, no conversational dyspnea  CVS: RRR  GI: Abdomen soft, non-tender, non-distended, no rebound tenderness / guarding  LLE:  Palpable dorsalis pedis and posterior tibialis of the left lower extremity.    LABS:                        8.1    7.34  )-----------( 199      ( 16 Apr 2022 07:15 )             25.4     04-16    136  |  96<L>  |  28.6<H>  ----------------------------<  108<H>  4.0   |  27.0  |  3.28<H>    Ca    8.4<L>      16 Apr 2022 07:15  Phos  3.6     04-16  Mg     1.7     04-16      PTT - ( 16 Apr 2022 07:15 )  PTT:86.8 sec    Assessment: 88 yo M with rest pain, Ischemic left lower extremity, chronic. Secondary to failed distal bypass graft. S/P LLE diagnostic angio on 4/11 with occluded SFA and pop,     now POD#4 s/p L fem to AT bypass with cryo vein c/b acute graft rethrombosis, now s/p thrombectomy.     Plan:   - Cont with knee immobilizer  - Renal diet  - heparin gtt  - Neurovascular checks of the left lower extremity: Palpable dorsalis pedis and posterior tibialis of the left lower extremity.

## 2022-04-17 NOTE — PROGRESS NOTE ADULT - SUBJECTIVE AND OBJECTIVE BOX
Subjective:    No acute events overnight. Patient afebrile, and VSS. Tolerating diet. Denies n/v/f/c/cp/sob.    MEDICATIONS  (STANDING):  aspirin  chewable 81 milliGRAM(s) Oral daily  atorvastatin 80 milliGRAM(s) Oral at bedtime  ceFAZolin   IVPB 1000 milliGRAM(s) IV Intermittent every 24 hours  chlorhexidine 2% Cloths 1 Application(s) Topical <User Schedule>  DULoxetine 60 milliGRAM(s) Oral daily  epoetin juan-epbx (RETACRIT) Injectable 72554 Unit(s) IV Push <User Schedule>  heparin  Infusion. 1300 Unit(s)/Hr (12 mL/Hr) IV Continuous <Continuous>  isosorbide   mononitrate ER Tablet (IMDUR) 30 milliGRAM(s) Oral daily  melatonin 3 milliGRAM(s) Oral at bedtime  melatonin 5 milliGRAM(s) Oral at bedtime  multivitamin 1 Tablet(s) Oral daily  NIFEdipine XL 90 milliGRAM(s) Oral daily  pantoprazole    Tablet 40 milliGRAM(s) Oral before breakfast  sevelamer carbonate 800 milliGRAM(s) Oral three times a day  tamsulosin 0.4 milliGRAM(s) Oral at bedtime  torsemide 20 milliGRAM(s) Oral daily    MEDICATIONS  (PRN):  acetaminophen     Tablet .. 975 milliGRAM(s) Oral every 6 hours PRN Mild Pain (1 - 3)  aluminum hydroxide/magnesium hydroxide/simethicone Suspension 30 milliLiter(s) Oral every 8 hours PRN Dyspepsia  ketorolac   Injectable 15 milliGRAM(s) IV Push every 8 hours PRN Severe Pain (7 - 10)  ondansetron Injectable 4 milliGRAM(s) IV Push every 6 hours PRN Nausea      Vital Signs Last 24 Hrs  T(C): 36.6 (16 Apr 2022 16:10), Max: 36.8 (16 Apr 2022 05:40)  T(F): 97.8 (16 Apr 2022 16:10), Max: 98.3 (16 Apr 2022 10:28)  HR: 59 (16 Apr 2022 16:10) (59 - 78)  BP: 113/46 (16 Apr 2022 16:10) (104/50 - 120/60)  BP(mean): --  RR: 17 (16 Apr 2022 16:10) (17 - 19)  SpO2: 95% (16 Apr 2022 13:54) (95% - 99%)      04-15  -  04-16  --------------------------------------------------------  IN:    Heparin Infusion: 313 mL    Oral Fluid: 960 mL  Total IN: 1273 mL    OUT:    Other (mL): 1700 mL  Total OUT: 1700 mL    Total NET: -427 mL      04-16 - 04-17  --------------------------------------------------------  IN:    Heparin Infusion: 112 mL    Oral Fluid: 240 mL  Total IN: 352 mL    OUT:  Total OUT: 0 mL    Total NET: 352 mL          Physical Exam:   GEN: patient resting comfortably in bed, in no acute distress  RESP: respirations are unlabored, no accessory muscle use, no conversational dyspnea  CVS: RRR  GI: Abdomen soft, non-tender, non-distended, no rebound tenderness / guarding  LLE:  Palpable dorsalis pedis and posterior tibialis of the left lower extremity.      LABS:                        8.1    7.34  )-----------( 199      ( 16 Apr 2022 07:15 )             25.4     04-16    136  |  96<L>  |  28.6<H>  ----------------------------<  108<H>  4.0   |  27.0  |  3.28<H>    Ca    8.4<L>      16 Apr 2022 07:15  Phos  3.6     04-16  Mg     1.7     04-16      PTT - ( 16 Apr 2022 07:15 )  PTT:86.8 sec

## 2022-04-17 NOTE — PROGRESS NOTE ADULT - ASSESSMENT
Assessment: 88 yo M with rest pain, Ischemic left lower extremity, chronic. Secondary to failed distal bypass graft. S/P LLE diagnostic angio on 4/11 with occluded SFA and pop, now POD#4 s/p L fem to AT bypass with cryovein c/b acute graft rethrombosis, now s/p thrombectomy.     Plan:   - Cont with knee immobilizer  - Renal diet  - heparin gtt  - pain control  - Neurovascular checks of the left lower extremity: Palpable dorsalis pedis and posterior tibialis of the left lower extremity.  - Disposition pending.

## 2022-04-18 NOTE — PROGRESS NOTE ADULT - SUBJECTIVE AND OBJECTIVE BOX
INTERVAL HPI/OVERNIGHT EVENTS:    Patient evaluated at bedside. No acute distress. No acute events overnight. Pain significantly improved. Remains hemodynamically stable and afebrile. On heparin gtt.     MEDICATIONS  (STANDING):  aspirin  chewable 81 milliGRAM(s) Oral daily  atorvastatin 80 milliGRAM(s) Oral at bedtime  ceFAZolin   IVPB 1000 milliGRAM(s) IV Intermittent every 24 hours  chlorhexidine 2% Cloths 1 Application(s) Topical <User Schedule>  DULoxetine 60 milliGRAM(s) Oral daily  epoetin juan-epbx (RETACRIT) Injectable 25042 Unit(s) IV Push <User Schedule>  heparin  Infusion. 1300 Unit(s)/Hr (12 mL/Hr) IV Continuous <Continuous>  isosorbide   mononitrate ER Tablet (IMDUR) 30 milliGRAM(s) Oral daily  melatonin 3 milliGRAM(s) Oral at bedtime  melatonin 5 milliGRAM(s) Oral at bedtime  multivitamin 1 Tablet(s) Oral daily  NIFEdipine XL 90 milliGRAM(s) Oral daily  pantoprazole    Tablet 40 milliGRAM(s) Oral before breakfast  sevelamer carbonate 800 milliGRAM(s) Oral three times a day  tamsulosin 0.4 milliGRAM(s) Oral at bedtime  torsemide 20 milliGRAM(s) Oral daily    MEDICATIONS  (PRN):  acetaminophen     Tablet .. 975 milliGRAM(s) Oral every 6 hours PRN Mild Pain (1 - 3)  aluminum hydroxide/magnesium hydroxide/simethicone Suspension 30 milliLiter(s) Oral every 8 hours PRN Dyspepsia  ketorolac   Injectable 15 milliGRAM(s) IV Push every 8 hours PRN Severe Pain (7 - 10)  ondansetron Injectable 4 milliGRAM(s) IV Push every 6 hours PRN Nausea      Vital Signs Last 24 Hrs  T(C): 36.6 (18 Apr 2022 05:50), Max: 36.7 (17 Apr 2022 22:40)  T(F): 97.9 (18 Apr 2022 05:50), Max: 98 (17 Apr 2022 22:40)  HR: 76 (18 Apr 2022 05:50) (59 - 76)  BP: 94/51 (18 Apr 2022 05:50) (94/51 - 111/63)  BP(mean): --  RR: 18 (18 Apr 2022 05:50) (18 - 19)  SpO2: 91% (18 Apr 2022 05:50) (91% - 93%)    Physical Exam:   GEN: patient resting comfortably in bed, in no acute distress  RESP: respirations are unlabored, no accessory muscle use, no conversational dyspnea  CVS: RRR  GI: Abdomen soft, non-tender, non-distended, no rebound tenderness / guarding  LLE:  Doppler AT signal to the LLE, palpable graft at the level of the knee. Demarcating 4th and 5th toes on the L. Groin with clean incision and palpable femoral pulse.       I&O's Detail    16 Apr 2022 07:01  -  17 Apr 2022 07:00  --------------------------------------------------------  IN:    Heparin Infusion: 280 mL    Oral Fluid: 480 mL  Total IN: 760 mL    OUT:  Total OUT: 0 mL    Total NET: 760 mL      17 Apr 2022 07:01  -  18 Apr 2022 06:58  --------------------------------------------------------  IN:    Heparin Infusion: 354 mL    Oral Fluid: 1200 mL  Total IN: 1554 mL    OUT:  Total OUT: 0 mL    Total NET: 1554 mL          LABS:                        8.6    7.12  )-----------( 224      ( 18 Apr 2022 05:59 )             27.0     04-18    131<L>  |  93<L>  |  51.3<H>  ----------------------------<  109<H>  4.4   |  22.0  |  5.00<H>    Ca    8.7      18 Apr 2022 05:59  Phos  4.3     04-18  Mg     2.1     04-18      PTT - ( 18 Apr 2022 06:00 )  PTT:115.6 sec      RADIOLOGY & ADDITIONAL STUDIES:

## 2022-04-18 NOTE — PROGRESS NOTE ADULT - SUBJECTIVE AND OBJECTIVE BOX
Subjective:    No acute events overnight. Patient afebrile, and VSS.     Tolerating diet.     Denies n/v/f/c/cp/sob.    MEDICATIONS  (STANDING):    aspirin  chewable 81 milliGRAM(s) Oral daily  atorvastatin 80 milliGRAM(s) Oral at bedtime  ceFAZolin   IVPB 1000 milliGRAM(s) IV Intermittent every 24 hours  chlorhexidine 2% Cloths 1 Application(s) Topical <User Schedule>  DULoxetine 60 milliGRAM(s) Oral daily  epoetin juan-epbx (RETACRIT) Injectable 81252 Unit(s) IV Push <User Schedule>  heparin  Infusion. 1300 Unit(s)/Hr (12 mL/Hr) IV Continuous <Continuous>  isosorbide   mononitrate ER Tablet (IMDUR) 30 milliGRAM(s) Oral daily  melatonin 3 milliGRAM(s) Oral at bedtime  melatonin 5 milliGRAM(s) Oral at bedtime  multivitamin 1 Tablet(s) Oral daily  NIFEdipine XL 90 milliGRAM(s) Oral daily  pantoprazole    Tablet 40 milliGRAM(s) Oral before breakfast  sevelamer carbonate 800 milliGRAM(s) Oral three times a day  tamsulosin 0.4 milliGRAM(s) Oral at bedtime  torsemide 20 milliGRAM(s) Oral daily    MEDICATIONS  (PRN):  acetaminophen     Tablet .. 975 milliGRAM(s) Oral every 6 hours PRN Mild Pain (1 - 3)  aluminum hydroxide/magnesium hydroxide/simethicone Suspension 30 milliLiter(s) Oral every 8 hours PRN Dyspepsia  ketorolac   Injectable 15 milliGRAM(s) IV Push every 8 hours PRN Severe Pain (7 - 10)  ondansetron Injectable 4 milliGRAM(s) IV Push every 6 hours PRN Nausea    Vital Signs Last 24 Hrs  T(C): 36.6 (16 Apr 2022 16:10), Max: 36.8 (16 Apr 2022 05:40)  T(F): 97.8 (16 Apr 2022 16:10), Max: 98.3 (16 Apr 2022 10:28)  HR: 59 (16 Apr 2022 16:10) (59 - 78)  BP: 113/46 (16 Apr 2022 16:10) (104/50 - 120/60)  RR: 17 (16 Apr 2022 16:10) (17 - 19)  SpO2: 95% (16 Apr 2022 13:54) (95% - 99%)      04-15  -  04-16  --------------------------------------------------------  IN:    Heparin Infusion: 313 mL    Oral Fluid: 960 mL  Total IN: 1273 mL    OUT:    Other (mL): 1700 mL  Total OUT: 1700 mL    Total NET: -427 mL      04-16 - 04-17  --------------------------------------------------------  IN:    Heparin Infusion: 112 mL    Oral Fluid: 240 mL  Total IN: 352 mL    OUT:  Total OUT: 0 mL    Total NET: 352 mL    Physical Exam:   GEN: patient resting comfortably in bed, in no acute distress  RESP: respirations are unlabored, no accessory muscle use, no conversational dyspnea  CVS: RRR  GI: Abdomen soft, non-tender, non-distended, no rebound tenderness / guarding  LLE:  Palpable dorsalis pedis and posterior tibialis of the left lower extremity.      LABS:                        8.1    7.34  )-----------( 199      ( 16 Apr 2022 07:15 )             25.4     04-16    136  |  96<L>  |  28.6<H>  ----------------------------<  108<H>  4.0   |  27.0  |  3.28<H>    Ca    8.4<L>      16 Apr 2022 07:15  Phos  3.6     04-16  Mg     1.7     04-16      PTT - ( 16 Apr 2022 07:15 )  PTT:86.8 sec    Assessment: 88 yo M with rest pain, Ischemic left lower extremity, chronic. Secondary to failed distal bypass graft. S/P LLE diagnostic angio on 4/11 with occluded SFA and pop, now POD#4 s/p L fem to AT bypass with cryovein c/b acute graft rethrombosis, now s/p thrombectomy.     Plan:   - +  Knee immobilizer  - Renal diet  - Heparin gtt  - Pain control  - Neurovascular checks of the left lower extremity:         Palpable dorsalis pedis and posterior tibialis of the left lower extremity.    - Disposition pending. PRBC Transfusion - PRN,

## 2022-04-18 NOTE — PROGRESS NOTE ADULT - SUBJECTIVE AND OBJECTIVE BOX
Patient was seen and evaluated on dialysis.   Patient is tolerating the procedure well.   T(C): 36.6 (04-18-22 @ 05:50), Max: 36.7 (04-17-22 @ 22:40)  HR: 76 (04-18-22 @ 05:50) (61 - 76)  BP: 94/51 (04-18-22 @ 05:50) (94/51 - 111/63)  Continue dialysis:   Dialyzer: revaclear 300  QB: 400 ml QD: 500ml.,  Goal UF 2 kg  over 3.5 hours

## 2022-04-18 NOTE — PROGRESS NOTE ADULT - ASSESSMENT
HD - M & F,     Remains Frail,     S/P VS,    Remains confined to Bed,     Severe Protein - Calorie malnutrition,

## 2022-04-18 NOTE — PROGRESS NOTE ADULT - ASSESSMENT
Assessment: 88 yo M with rest pain, Ischemic left lower extremity, chronic. Secondary to failed distal bypass graft. S/P LLE diagnostic angio on 4/11 with occluded SFA and pop, now POD#4 s/p L fem to AT bypass with cryovein c/b acute graft rethrombosis, now s/p thrombectomy. Progressing well, carrera removed, continue on HD.   Requires to be DC on lovenox, due to history of GIB with oral AC.     Plan:   - Cont with knee immobilizer  - Renal diet  - heparin gtt, will assess with nephrology for lovenox dosing.   - pain control  - Neurovascular checks of the left lower extremity: doppler AT LLE and palpable at the knee.   - PT for dispo.

## 2022-04-19 NOTE — PROGRESS NOTE ADULT - ASSESSMENT
ESRD on HD  Ischemic left lower extremity, chronic-  s/p L fem to AT bypass with cryovein c/b acute graft rethrombosis, now s/p thrombectomy POD 5  Anemia of CKD  HT    Pt with loss of AT pulse, back on heparin gtt  Hb low  Will transfuse with HD  HD tomorrow- UF as tolerated    d/w Vascular surgery   ESRD on HD  Ischemic left lower extremity, chronic-  s/p L fem to AT bypass with cryovein c/b acute graft rethrombosis, now s/p thrombectomy POD 5  Anemia of CKD  HTN    Pt with loss of AT pulse, back on heparin gtt  Hb low  Will transfuse with HD  HD tomorrow- UF as tolerated    d/w Vascular surgery

## 2022-04-19 NOTE — PROGRESS NOTE ADULT - SUBJECTIVE AND OBJECTIVE BOX
INTERVAL HPI/OVERNIGHT EVENTS:    Patient evaluated at bedside. No acute distress. Patient was supposed to start on therapeutic lovenox yesterday, however, he had lost on his AT signal and graft was not palpable anymore around the knee, so he was restarted on IV heparin with bolus, which improved the signals near the knee. Remains hemodynamically stable and afebrile. Denies fevers, chills, nausea, emesis.  Denies chest pain, dyspnea.  Denies constipation, diarrhea. Denies headaches, dizziness, changes in vision.     MEDICATIONS  (STANDING):  aspirin  chewable 81 milliGRAM(s) Oral daily  atorvastatin 80 milliGRAM(s) Oral at bedtime  ceFAZolin   IVPB 1000 milliGRAM(s) IV Intermittent every 24 hours  chlorhexidine 2% Cloths 1 Application(s) Topical <User Schedule>  DULoxetine 60 milliGRAM(s) Oral daily  epoetin juan-epbx (RETACRIT) Injectable 65879 Unit(s) IV Push <User Schedule>  heparin  Infusion.  Unit(s)/Hr (12 mL/Hr) IV Continuous <Continuous>  isosorbide   mononitrate ER Tablet (IMDUR) 30 milliGRAM(s) Oral daily  melatonin 3 milliGRAM(s) Oral at bedtime  melatonin 5 milliGRAM(s) Oral at bedtime  multivitamin 1 Tablet(s) Oral daily  NIFEdipine XL 90 milliGRAM(s) Oral daily  pantoprazole    Tablet 40 milliGRAM(s) Oral before breakfast  sevelamer carbonate 800 milliGRAM(s) Oral three times a day  tamsulosin 0.4 milliGRAM(s) Oral at bedtime  torsemide 20 milliGRAM(s) Oral daily    MEDICATIONS  (PRN):  acetaminophen     Tablet .. 975 milliGRAM(s) Oral every 6 hours PRN Mild Pain (1 - 3)  aluminum hydroxide/magnesium hydroxide/simethicone Suspension 30 milliLiter(s) Oral every 8 hours PRN Dyspepsia  heparin   Injectable 5500 Unit(s) IV Push every 6 hours PRN For aPTT less than 40  heparin   Injectable 2500 Unit(s) IV Push every 6 hours PRN For aPTT between 40 - 57  ketorolac   Injectable 15 milliGRAM(s) IV Push every 8 hours PRN Severe Pain (7 - 10)  ondansetron Injectable 4 milliGRAM(s) IV Push every 6 hours PRN Nausea      Vital Signs Last 24 Hrs  T(C): 37.7 (19 Apr 2022 06:00), Max: 37.7 (19 Apr 2022 06:00)  T(F): 99.8 (19 Apr 2022 06:00), Max: 99.8 (19 Apr 2022 06:00)  HR: 76 (19 Apr 2022 06:00) (52 - 99)  BP: 133/62 (19 Apr 2022 06:00) (100/60 - 154/64)  BP(mean): --  RR: 18 (19 Apr 2022 06:00) (17 - 18)  SpO2: 96% (19 Apr 2022 06:00) (95% - 98%)    Physical Exam:   GEN: patient resting comfortably in bed, in no acute distress  RESP: respirations are unlabored, no accessory muscle use, no conversational dyspnea  CVS: RRR  GI: Abdomen soft, non-tender, non-distended, no rebound tenderness / guarding  LLE:  Doppler signal to the graft up to the level of the popliteal fossa, no AT/DP signals.  Demarcating 4th and 5th toes on the L. Groin with clean incision and weakly palpable femoral pulse.       I&O's Detail    17 Apr 2022 07:01  -  18 Apr 2022 07:00  --------------------------------------------------------  IN:    Heparin Infusion: 354 mL    Oral Fluid: 1200 mL  Total IN: 1554 mL    OUT:  Total OUT: 0 mL    Total NET: 1554 mL      18 Apr 2022 07:01  -  19 Apr 2022 06:36  --------------------------------------------------------  IN:    Oral Fluid: 240 mL  Total IN: 240 mL    OUT:    Other (mL): 2000 mL  Total OUT: 2000 mL    Total NET: -1760 mL          LABS:                        7.9    5.86  )-----------( 235      ( 19 Apr 2022 03:10 )             25.6     04-18    131<L>  |  93<L>  |  51.3<H>  ----------------------------<  109<H>  4.4   |  22.0  |  5.00<H>    Ca    8.7      18 Apr 2022 05:59  Phos  4.3     04-18  Mg     2.1     04-18      PTT - ( 19 Apr 2022 03:10 )  PTT:94.0 sec      RADIOLOGY & ADDITIONAL STUDIES:

## 2022-04-19 NOTE — PROGRESS NOTE ADULT - ASSESSMENT
Assessment: 88 yo M with rest pain, Ischemic left lower extremity, chronic. Secondary to failed distal bypass graft. S/P LLE diagnostic angio on 4/11 with occluded SFA and pop, now POD#4 s/p L fem to AT bypass with cryovein c/b acute graft rethrombosis, now s/p thrombectomy. Progressing well, carrera removed, continue on HD.   Requires to be DC on lovenox, due to history of GIB with oral AC, however, given acute change in signal was restarted on heparin gtt qith improved signals around the knee.     Plan:   - Cont with knee immobilizer  - Renal diet  - heparin gtt, full AC  - pain control  - Neurovascular checks Q4 of the left lower extremity: doppler signals  at the knee.

## 2022-04-19 NOTE — PROGRESS NOTE ADULT - SUBJECTIVE AND OBJECTIVE BOX
[facility] DIVISION OF KIDNEY DISEASES AND HYPERTENSION -- HEMODIALYSIS NOTE  --------------------------------------------------------------------------------  Chief Complaint: ESRD/Ongoing hemodialysis requirement    24 hour events/subjective:  s/p HD yesterday  Resumed on heparin gtt due to loss of AT pulses      PAST HISTORY  --------------------------------------------------------------------------------  No significant changes to PMH, PSH, FHx, SHx, unless otherwise noted    ALLERGIES & MEDICATIONS  --------------------------------------------------------------------------------  Allergies  Plavix (Hives)  Toprol-XL (Rash)        Standing Inpatient Medications  aspirin  chewable 81 milliGRAM(s) Oral daily  atorvastatin 80 milliGRAM(s) Oral at bedtime  ceFAZolin   IVPB 1000 milliGRAM(s) IV Intermittent every 24 hours  chlorhexidine 2% Cloths 1 Application(s) Topical <User Schedule>  DULoxetine 60 milliGRAM(s) Oral daily  epoetin juan-epbx (RETACRIT) Injectable 24782 Unit(s) IV Push <User Schedule>  heparin  Infusion.  Unit(s)/Hr IV Continuous <Continuous>  isosorbide   mononitrate ER Tablet (IMDUR) 30 milliGRAM(s) Oral daily  melatonin 3 milliGRAM(s) Oral at bedtime  melatonin 5 milliGRAM(s) Oral at bedtime  multivitamin 1 Tablet(s) Oral daily  NIFEdipine XL 90 milliGRAM(s) Oral daily  pantoprazole    Tablet 40 milliGRAM(s) Oral before breakfast  sevelamer carbonate 800 milliGRAM(s) Oral three times a day  tamsulosin 0.4 milliGRAM(s) Oral at bedtime  torsemide 20 milliGRAM(s) Oral daily    PRN Inpatient Medications  acetaminophen     Tablet .. 975 milliGRAM(s) Oral every 6 hours PRN  aluminum hydroxide/magnesium hydroxide/simethicone Suspension 30 milliLiter(s) Oral every 8 hours PRN  heparin   Injectable 5500 Unit(s) IV Push every 6 hours PRN  heparin   Injectable 2500 Unit(s) IV Push every 6 hours PRN  ketorolac   Injectable 15 milliGRAM(s) IV Push every 8 hours PRN  ondansetron Injectable 4 milliGRAM(s) IV Push every 6 hours PRN      REVIEW OF SYSTEMS  --------------------------------------------------------------------------------  Gen: No weight changes, fatigue, fevers/chills, weakness  Skin: No rashes  Head/Eyes/Ears/Mouth: No headache  Respiratory: No dyspnea, cough,  CV: No chest pain, orthopnea  GI: No abdominal pain, diarrhea, constipation, nausea, vomiting,  MSK: No joint pain  Neuro: No dizziness/lightheadedness, weakness  Heme: No bleeding  Psych: No significant nervousness, anxiety, stress, depression    All other systems were reviewed and are negative, except as noted.    VITALS/PHYSICAL EXAM  --------------------------------------------------------------------------------  T(C): 37.7 (04-19-22 @ 06:00), Max: 37.7 (04-19-22 @ 06:00)  HR: 74 (04-19-22 @ 10:34) (70 - 99)  BP: 136/52 (04-19-22 @ 10:34) (125/73 - 154/64)  RR: 18 (04-19-22 @ 10:34) (18 - 18)  SpO2: 95% (04-19-22 @ 10:34) (95% - 96%)  Wt(kg): --        04-18-22 @ 07:01  -  04-19-22 @ 07:00  --------------------------------------------------------  IN: 240 mL / OUT: 2000 mL / NET: -1760 mL      Physical Exam:  	Gen: NAD  	HEENT: PERRL, supple neck,  	Pulm: CTA B/L  	CV: RRR, S1S2; no rub  	Abd: +BS, soft, nontender  	UE: Warm,  no asterixis  	LE: Warm, + edema  	Neuro: No focal deficits  	Psych: Normal affect and mood  	Skin: Warm  	Vascular access: AVF    LABS/STUDIES  --------------------------------------------------------------------------------              8.0    5.00  >-----------<  254      [04-19-22 @ 07:44]              25.4     131  |  93  |  51.3  ----------------------------<  109      [04-18-22 @ 05:59]  4.4   |  22.0  |  5.00        Ca     8.7     [04-18-22 @ 05:59]      Mg     2.1     [04-18-22 @ 05:59]      Phos  4.3     [04-18-22 @ 05:59]        PTT: 87.7       [04-19-22 @ 08:37]      Iron 43, TIBC 249, %sat 17      [07-13-21 @ 16:07]  Ferritin 498      [07-13-21 @ 16:07]  HbA1c 4.9      [08-09-18 @ 05:54]  Lipid: chol 126, , HDL 60, LDL --      [06-03-21 @ 06:07]

## 2022-04-20 NOTE — BRIEF OPERATIVE NOTE - NSICDXBRIEFPOSTOP_GEN_ALL_CORE_FT
POST-OP DIAGNOSIS:  Critical limb ischemia of left lower extremity 11-Apr-2022 09:28:43  Jose Antonio Mendez  

## 2022-04-20 NOTE — PROGRESS NOTE ADULT - SUBJECTIVE AND OBJECTIVE BOX
Vital Signs Last 24 Hrs  T(C): 36.7 (20 Apr 2022 09:06), Max: 36.8 (19 Apr 2022 22:33)  T(F): 98.1 (20 Apr 2022 09:06), Max: 98.2 (19 Apr 2022 22:33)  HR: 60 (20 Apr 2022 09:06) (60 - 82)  BP: 111/58 (20 Apr 2022 09:06) (104/44 - 136/52)  RR: 18 (20 Apr 2022 09:06) (18 - 19)  SpO2: 95% (20 Apr 2022 08:25) (95% - 100%)  HD today. Tolerating dialysis and ultrafiltration.  Pre Laboratory values personally reviewed by me.  Dialysis adjusted appropriately based on current values.  Will continue the current medical management.  Next hemodialysis as scheduled.  Discussed with nursing, primary care team.

## 2022-04-20 NOTE — BRIEF OPERATIVE NOTE - NSICDXBRIEFPREOP_GEN_ALL_CORE_FT
PRE-OP DIAGNOSIS:  Lower limb ischemia 11-Apr-2022 09:26:43 Left, acute on  chronic Jose Antonio Mendez  

## 2022-04-20 NOTE — PROGRESS NOTE ADULT - ASSESSMENT
Now s/p LLE angio via RFA with occluded vein graft and AT s/p successful mechanical thrombectomy and PTA of graft site/AT, procedure performed by Dr. Omalley, received heparin and IV sedation intraprocedurally, arrived to recovery in NAD and HDS, RFA closed with Mynx device, access site stable, no bleed/hematoma, distal pulse + by doppler. Plan to transfer back to [facility] once post PTA recovery completed.     Plan:  -Formal cath report pending  -Post procedure management/monitoring per protocol  -Access site precautions  -Bedrest x 3 hours post procedure  -Labs in am per primary team  -No IVH given dx ESRD  -Restart heparin gtt at previous rate with NO bolus and follow heparin protocol  -Continue current medical therapy  -Educated regarding post procedure management and care  -Discussed the importance of RF modification  -DISPO: Continue in-pt management

## 2022-04-20 NOTE — PROGRESS NOTE ADULT - SUBJECTIVE AND OBJECTIVE BOX
INTERVAL HPI/OVERNIGHT EVENTS:    Patient evaluated at bedside. No acute distress. intermittent pain, on hep gtt     MEDICATIONS  (STANDING):  aspirin  chewable 81 milliGRAM(s) Oral daily  atorvastatin 80 milliGRAM(s) Oral at bedtime  ceFAZolin   IVPB 1000 milliGRAM(s) IV Intermittent every 24 hours  chlorhexidine 2% Cloths 1 Application(s) Topical <User Schedule>  DULoxetine 60 milliGRAM(s) Oral daily  epoetin juan-epbx (RETACRIT) Injectable 14361 Unit(s) IV Push <User Schedule>  heparin  Infusion.  Unit(s)/Hr (12 mL/Hr) IV Continuous <Continuous>  isosorbide   mononitrate ER Tablet (IMDUR) 30 milliGRAM(s) Oral daily  melatonin 3 milliGRAM(s) Oral at bedtime  melatonin 5 milliGRAM(s) Oral at bedtime  multivitamin 1 Tablet(s) Oral daily  NIFEdipine XL 90 milliGRAM(s) Oral daily  pantoprazole    Tablet 40 milliGRAM(s) Oral before breakfast  sevelamer carbonate 800 milliGRAM(s) Oral three times a day  tamsulosin 0.4 milliGRAM(s) Oral at bedtime  torsemide 20 milliGRAM(s) Oral daily    MEDICATIONS  (PRN):  acetaminophen     Tablet .. 975 milliGRAM(s) Oral every 6 hours PRN Mild Pain (1 - 3)  aluminum hydroxide/magnesium hydroxide/simethicone Suspension 30 milliLiter(s) Oral every 8 hours PRN Dyspepsia  heparin   Injectable 5500 Unit(s) IV Push every 6 hours PRN For aPTT less than 40  heparin   Injectable 2500 Unit(s) IV Push every 6 hours PRN For aPTT between 40 - 57  ketorolac   Injectable 15 milliGRAM(s) IV Push every 8 hours PRN Severe Pain (7 - 10)  ondansetron Injectable 4 milliGRAM(s) IV Push every 6 hours PRN Nausea      Vital Signs Last 24 Hrs  T(C): 36.8 (20 Apr 2022 06:00), Max: 36.8 (19 Apr 2022 22:33)  T(F): 98.2 (20 Apr 2022 06:00), Max: 98.2 (19 Apr 2022 22:33)  HR: 73 (20 Apr 2022 06:00) (62 - 82)  BP: 125/56 (20 Apr 2022 06:00) (104/44 - 136/52)  BP(mean): --  RR: 18 (20 Apr 2022 06:00) (18 - 19)  SpO2: 100% (20 Apr 2022 06:00) (95% - 100%)    Physical Exam:   GEN: patient resting comfortably in bed, in no acute distress  RESP: respirations are unlabored, no accessory muscle use, no conversational dyspnea  CVS: RRR  GI: Abdomen soft, non-tender, non-distended, no rebound tenderness / guarding  LLE:  Doppler signal to the graft up to the level of the popliteal fossa, no AT/DP signals.  Demarcating 4th and 5th toes on the L. Groin with clean incision and weakly palpable femoral pulse.       I&O's Detail    19 Apr 2022 07:01  -  20 Apr 2022 07:00  --------------------------------------------------------  IN:    Heparin Infusion: 144 mL    Oral Fluid: 720 mL  Total IN: 864 mL    OUT:  Total OUT: 0 mL    Total NET: 864 mL            LABS:                                           8.7    5.98  )-----------( 250      ( 20 Apr 2022 05:28 )             28.1   04-20    135  |  93<L>  |  38.5<H>  ----------------------------<  126<H>  4.6   |  23.0  |  4.13<H>    Ca    8.7      20 Apr 2022 05:28  Phos  4.7     04-20  Mg     1.9     04-20    PTT - ( 19 Apr 2022 08:37 )  PTT:87.7 sec      RADIOLOGY & ADDITIONAL STUDIES:

## 2022-04-20 NOTE — BRIEF OPERATIVE NOTE - ASSISTANT(S)
Dr. Hunter Tong PGY3
Dr. Aimee Rosado (PGY2)
Nando Mendez M.D
Nando Mendez M.D PGY-1  [facility]

## 2022-04-20 NOTE — PROGRESS NOTE ADULT - SUBJECTIVE AND OBJECTIVE BOX
Department of Cardiology                                                                  [facility]/[place] [address]                                                            Telephone: [phone]. Fax:[phone]                                                                             Pre- Procedure H + P/Progress Note      HPI:        Symptoms:        Angina (Class):        Ischemic Symptoms:     Heart Failure:        Systolic/Diastolic/Combined:        NYHA Class (within 2 weeks):     Assessment of LVEF:       EF:        Assessed by:        Date:     Prior Cardiac Interventions:         Noninvasive Testing:   Stress Test: Date:        Protocol:        Duration of Exercise:        Symptoms:        EKG Changes:        DTS:        Myocardial Imaging:        Risk Assessment (Low, Medium, High):     Echo:       Risk Assessments:  ASA:  Mallampati:  Bleeding Risk:  Creatinine:  GFR:    Associated Risk Factors:        Cerebrovascular Disease: N/A       Chronic Lung Disease: N/A       Peripheral Arterial Disease: N/A       Chronic Kidney Disease (if yes, what is GFR): N/A       Uncontrolled Diabetes (if yes, what is HgbA1C or FBS): N/A       Poorly Controlled Hypertension (if yes, what is SBP): N/A       Morbid Obesity (if yes, what is BMI): N/A       History of Recent Ventricular Arrhythmia: N/A       Inability to Ambulate Safely: N/A       Need for Therapeutic Anticoagulation: N/A       Antiplatelet or Contrast Allergy: N/A    Antianginal Therapies:        Beta Blockers:         Calcium Channel Blockers:        Long Acting Nitrates:        Ranexa:     	  MEDICATIONS:  isosorbide   mononitrate ER Tablet (IMDUR) 30 milliGRAM(s) Oral daily  NIFEdipine XL 90 milliGRAM(s) Oral daily  tamsulosin 0.4 milliGRAM(s) Oral at bedtime  torsemide 20 milliGRAM(s) Oral daily    ceFAZolin   IVPB 1000 milliGRAM(s) IV Intermittent every 24 hours      acetaminophen     Tablet .. 975 milliGRAM(s) Oral every 6 hours PRN  DULoxetine 60 milliGRAM(s) Oral daily  HYDROmorphone   Tablet 2 milliGRAM(s) Oral every 4 hours PRN  HYDROmorphone  Injectable 0.5 milliGRAM(s) IV Push every 4 hours PRN  melatonin 5 milliGRAM(s) Oral at bedtime  melatonin 3 milliGRAM(s) Oral at bedtime  ondansetron Injectable 4 milliGRAM(s) IV Push every 6 hours PRN    aluminum hydroxide/magnesium hydroxide/simethicone Suspension 30 milliLiter(s) Oral every 8 hours PRN  pantoprazole    Tablet 40 milliGRAM(s) Oral before breakfast    atorvastatin 80 milliGRAM(s) Oral at bedtime    aspirin  chewable 81 milliGRAM(s) Oral daily  chlorhexidine 2% Cloths 1 Application(s) Topical <User Schedule>  epoetin juan-epbx (RETACRIT) Injectable 02001 Unit(s) IV Push <User Schedule>  heparin   Injectable 5500 Unit(s) IV Push every 6 hours PRN  heparin   Injectable 2500 Unit(s) IV Push every 6 hours PRN  heparin  Infusion.  Unit(s)/Hr IV Continuous <Continuous>  multivitamin 1 Tablet(s) Oral daily        ROS: as stated above, otherwise negative    PHYSICAL EXAM:  Constitutional: A & O x 3  HEENT:   Normal oral mucosa, PERRL, EOMI	  Cardiovascular: Normal S1 S2, No JVD, No murmurs, No edema  Respiratory: Lungs clear to auscultation	  Gastrointestinal:  Soft, Non-tender, + BS	  Skin: No rashes, No ecchymoses, No cyanosis  Neurologic: Non-focal  Extremities: Normal range of motion, No clubbing, cyanosis or edema  Vascular: Peripheral pulses palpable 2+ bilaterally      T(C): 36.8 (22 @ 06:00), Max: 36.8 (22 @ 22:33)  HR: 73 (22 @ 06:00) (62 - 82)  BP: 125/56 (22 @ 06:00) (104/44 - 136/52)  RR: 18 (22 @ 06:00) (18 - 19)  SpO2: 100% (22 @ 06:00) (95% - 100%)  Wt(kg): --      I&O's Summary    2022 07:01  -  2022 07:00  --------------------------------------------------------  IN: 948 mL / OUT: 0 mL / NET: 948 mL        Daily     Daily Weight in k.4 (2022 06:00)    TELEMETRY: 	      ECG:  	    LABS:	 	                                    8.7    5.98  )-----------( 250      ( 2022 05:28 )             28.1     04-20    135  |  93<L>  |  38.5<H>  ----------------------------<  126<H>  4.6   |  23.0  |  4.13<H>    Ca    8.7      2022 05:28  Phos  4.7     04-20  Mg     1.9     -20        Tnl:    Lipid Profile:   TC  TG  LDL  HDL    HgA1c:     proBNP:     TSH:     Impression:      Plan:  -plan for LHC via RA vs FA  -patient seen and examined  -confirmed appropriate NPO duration  -ECG and Labs reviewed  -Aspirin 81mg po pre-cath  -NS 250mL bolus pre-cath******  -procedure discussed with patient; risks and benefits explained, questions answered  -consent obtained by attending IC                                                                               Department of Cardiology                                                                  [facility]/[place] [address] [address]                                                            Telephone: [phone]. Fax:[phone]                                                                             Pre- Procedure H + P/Progress Note      HPI:  88yo male with h/o ESRD on HD (M/F), anemia, CHFpEF, arrythmia, HTN, HLD, CAD, a-fib and LLE DVT (used to be on Flecainide and no longer on AC due to GI bleed), AS s/p TAVR, and PAD (RLE fem-pop bypass, revised LLE fem-pop bypass, 2021, CFA to AT with cryovein ) presents with red, swollen painful left leg worsening for 1 week.  Patient was seen in the office by Dr. Mason.  A bypass duplex was performed and it was noted that the left bypass graft was thrombosed.  The plan at that time was to schedule an angiogram and recall patient, but the patient pain has been difficult to control at home.  No reports of any recent Gi blood loss, no chest pain, no sob, fever or chills    S/P LLE diagnostic angio on  with occluded SFA and pop, now POD#4 s/p L fem to AT bypass with cryovein c/b acute graft rethrombosis and s/p thrombectomy, currently with severe LLE pain concerning for CLI, LLE discoloration and modeled, +necrotic lesions left toes, dressing in place left foot, heparin gtt restarted, now plan for LLE angio +/- PTA to be performed by Dr. Omalley.     Arrived to [facility] area in severe 10/10 LLE pain, given dilaudid 0.5mg IV X 1 followed by Fentanyl 25mcg IV X 2 with improvement.        Symptoms:   Rita class 6  Ilana stage IV           Risk Assessments:  ASA: 3  Mallampati: 2  Bleeding Risk: 11.3%  Creatinine: 4.13  GFR: 13    Associated Risk Factors:        Frailty assessment: severe frailty       Cerebrovascular Disease: yes, carotid atherosclerosis       Chronic Lung Disease: N/A       Peripheral Arterial Disease: yes, prior LE bypass and PTA       Chronic Kidney Disease (if yes, what is GFR): ESRD on HD       Uncontrolled Diabetes (if yes, what is HgbA1C or FBS): N/A       Poorly Controlled Hypertension (if yes, what is SBP): N/A       Morbid Obesity (if yes, what is BMI): N/A       History of Recent Ventricular Arrhythmia: N/A       Inability to Ambulate Safely: N/A       Need for Therapeutic Anticoagulation: yes       Antiplatelet or Contrast Allergy: N/A    	  MEDICATIONS:  isosorbide   mononitrate ER Tablet (IMDUR) 30 milliGRAM(s) Oral daily  NIFEdipine XL 90 milliGRAM(s) Oral daily  tamsulosin 0.4 milliGRAM(s) Oral at bedtime  torsemide 20 milliGRAM(s) Oral daily  ceFAZolin   IVPB 1000 milliGRAM(s) IV Intermittent every 24 hours  acetaminophen     Tablet .. 975 milliGRAM(s) Oral every 6 hours PRN  DULoxetine 60 milliGRAM(s) Oral daily  HYDROmorphone   Tablet 2 milliGRAM(s) Oral every 4 hours PRN  HYDROmorphone  Injectable 0.5 milliGRAM(s) IV Push every 4 hours PRN  melatonin 5 milliGRAM(s) Oral at bedtime  melatonin 3 milliGRAM(s) Oral at bedtime  ondansetron Injectable 4 milliGRAM(s) IV Push every 6 hours PRN  aluminum hydroxide/magnesium hydroxide/simethicone Suspension 30 milliLiter(s) Oral every 8 hours PRN  pantoprazole    Tablet 40 milliGRAM(s) Oral before breakfast  atorvastatin 80 milliGRAM(s) Oral at bedtime  aspirin  chewable 81 milliGRAM(s) Oral daily  chlorhexidine 2% Cloths 1 Application(s) Topical <User Schedule>  epoetin juan-epbx (RETACRIT) Injectable 67360 Unit(s) IV Push <User Schedule>  heparin   Injectable 5500 Unit(s) IV Push every 6 hours PRN  heparin   Injectable 2500 Unit(s) IV Push every 6 hours PRN  heparin  Infusion.  Unit(s)/Hr IV Continuous <Continuous>  multivitamin 1 Tablet(s) Oral daily        ROS: as stated above, otherwise negative    PHYSICAL EXAM:  Constitutional: A & O x 3, frail  HEENT:   Normal oral mucosa, PERRL, EOMI	  Cardiovascular: Normal S1 S2, No JVD, No murmurs  Respiratory: Lungs clear to auscultation	  Gastrointestinal:  Soft, Non-tender, + BS	  Skin: No rashes  Neurologic: Non-focal  Extremities: Normal range of motion, No clubbing, cyanosis or edema  Vascular: LLE below knee with discoloration and modeling, left toes with necrotic lesions and dressing on left foot lesion. no distal pulses LLE Peripheral pulses palpable 2+ bilaterally      T(C): 36.8 (22 @ 06:00), Max: 36.8 (22 @ 22:33)  HR: 73 (22 @ 06:00) (62 - 82)  BP: 125/56 (22 @ 06:00) (104/44 - 136/52)  RR: 18 (22 @ 06:00) (18 - 19)  SpO2: 100% (22 @ 06:00) (95% - 100%)  Wt(kg): --      I&O's Summary    2022 07:01  -  2022 07:00  --------------------------------------------------------  IN: 948 mL / OUT: 0 mL / NET: 948 mL        Daily     Daily Weight in k.4 (2022 06:00)    TELEMETRY: 	      ECG:  	    LABS:	 	                                    8.7    5.98  )-----------( 250      ( 2022 05:28 )             28.1     04-20    135  |  93<L>  |  38.5<H>  ----------------------------<  126<H>  4.6   |  23.0  |  4.13<H>    Ca    8.7      2022 05:28  Phos  4.7     04-20  Mg     1.9     04-20        Tnl:    Lipid Profile:   TC  TG  LDL  HDL    HgA1c:     proBNP:     TSH:     Impression:      Plan:  -plan for LHC via RA vs FA  -patient seen and examined  -confirmed appropriate NPO duration  -ECG and Labs reviewed  -Aspirin 81mg po pre-cath  -NS 250mL bolus pre-cath******  -procedure discussed with patient; risks and benefits explained, questions answered  -consent obtained by attending IC                                                                               Department of Cardiology                                                                  [facility]/[place] [address] [address] [facility]                                                            Telephone: [phone]. Fax:[phone]                                                                             Pre- Procedure H + P/Progress Note      HPI:  88yo male with h/o ESRD on HD (M/F), anemia, CHFpEF, arrythmia, HTN, HLD, CAD, a-fib and LLE DVT (used to be on Flecainide and no longer on AC due to GI bleed), AS s/p TAVR, and PAD (RLE fem-pop bypass, revised LLE fem-pop bypass, 2021, CFA to AT with cryovein ) presents with red, swollen painful left leg worsening for 1 week.  Patient was seen in the office by Dr. Mason.  A bypass duplex was performed and it was noted that the left bypass graft was thrombosed.  The plan at that time was to schedule an angiogram and recall patient, but the patient pain has been difficult to control at home.  No reports of any recent Gi blood loss, no chest pain, no sob, fever or chills    S/P LLE diagnostic angio on  with occluded SFA and pop, now POD#4 s/p L fem to AT bypass with cryovein c/b acute graft rethrombosis and s/p thrombectomy, currently with severe LLE pain concerning for CLI, LLE discoloration and modeled, +necrotic lesions left toes, dressing in place left foot, heparin gtt restarted, now plan for LLE angio +/- PTA to be performed by Dr. Omalley.     Arrived to [facility] area in severe 10/10 LLE pain, given dilaudid 0.5mg IV X 1 followed by Fentanyl 25mcg IV X 2 with improvement.        Symptoms:   Rita class 6  Ilana stage IV           Risk Assessments:  ASA: 3  Mallampati: 2  Bleeding Risk: 11.3%  Creatinine: 4.13  GFR: 13    Associated Risk Factors:        Frailty assessment: severe frailty       Cerebrovascular Disease: yes, carotid atherosclerosis       Chronic Lung Disease: N/A       Peripheral Arterial Disease: yes, prior LE bypass and PTA       Chronic Kidney Disease (if yes, what is GFR): ESRD on HD       Uncontrolled Diabetes (if yes, what is HgbA1C or FBS): N/A       Poorly Controlled Hypertension (if yes, what is SBP): N/A       Morbid Obesity (if yes, what is BMI): N/A       History of Recent Ventricular Arrhythmia: N/A       Inability to Ambulate Safely: N/A       Need for Therapeutic Anticoagulation: yes       Antiplatelet or Contrast Allergy: N/A    	  MEDICATIONS:  isosorbide   mononitrate ER Tablet (IMDUR) 30 milliGRAM(s) Oral daily  NIFEdipine XL 90 milliGRAM(s) Oral daily  tamsulosin 0.4 milliGRAM(s) Oral at bedtime  torsemide 20 milliGRAM(s) Oral daily  ceFAZolin   IVPB 1000 milliGRAM(s) IV Intermittent every 24 hours  acetaminophen     Tablet .. 975 milliGRAM(s) Oral every 6 hours PRN  DULoxetine 60 milliGRAM(s) Oral daily  HYDROmorphone   Tablet 2 milliGRAM(s) Oral every 4 hours PRN  HYDROmorphone  Injectable 0.5 milliGRAM(s) IV Push every 4 hours PRN  melatonin 5 milliGRAM(s) Oral at bedtime  melatonin 3 milliGRAM(s) Oral at bedtime  ondansetron Injectable 4 milliGRAM(s) IV Push every 6 hours PRN  aluminum hydroxide/magnesium hydroxide/simethicone Suspension 30 milliLiter(s) Oral every 8 hours PRN  pantoprazole    Tablet 40 milliGRAM(s) Oral before breakfast  atorvastatin 80 milliGRAM(s) Oral at bedtime  aspirin  chewable 81 milliGRAM(s) Oral daily  chlorhexidine 2% Cloths 1 Application(s) Topical <User Schedule>  epoetin juan-epbx (RETACRIT) Injectable 61610 Unit(s) IV Push <User Schedule>  heparin   Injectable 5500 Unit(s) IV Push every 6 hours PRN  heparin   Injectable 2500 Unit(s) IV Push every 6 hours PRN  heparin  Infusion.  Unit(s)/Hr IV Continuous <Continuous>  multivitamin 1 Tablet(s) Oral daily        ROS: as stated above, otherwise negative    PHYSICAL EXAM:  Constitutional: A & O x 3, frail  HEENT:   Normal oral mucosa, PERRL, EOMI	  Cardiovascular: Normal S1 S2, No JVD, No murmurs  Respiratory: Lungs clear to auscultation	  Gastrointestinal:  Soft, Non-tender, + BS	  Skin: No rashes  Neurologic: Non-focal  Extremities: Normal range of motion, No clubbing, cyanosis or edema  Vascular: LLE below knee with discoloration and modeling, left toes with necrotic lesions and dressing on left foot lesion. no distal pulses LLE       T(C): 36.8 (22 @ 06:00), Max: 36.8 (22 @ 22:33)  HR: 73 (22 @ 06:00) (62 - 82)  BP: 125/56 (22 @ 06:00) (104/44 - 136/52)  RR: 18 (22 @ 06:00) (18 - 19)  SpO2: 100% (22 @ 06:00) (95% - 100%)  Wt 165kg      I&O's Summary    2022 07:01  -  2022 07:00  --------------------------------------------------------  IN: 948 mL / OUT: 0 mL / NET: 948 mL      Daily Weight in k.4 (2022 06:00)    TELEMETRY: Paced	      ECG: Paced    LABS:	 	                          8.7    5.98  )-----------( 250      ( 2022 05:28 )             28.1     04-20    135  |  93<L>  |  38.5<H>  ----------------------------<  126<H>  4.6   |  23.0  |  4.13<H>    Ca    8.7      2022 05:28  Phos  4.7     04-20  Mg     1.9     04-20      Impression:  88yo male with extensive vasculopathy, presented with occluded SFA and pop, now POD#4 s/p L fem to AT bypass with cryovein c/b acute graft rethrombosis and s/p thrombectomy, currently with severe LLE pain concerning for CLI, LLE discoloration and modeled, +necrotic lesions left toes, dressing in place left foot, heparin gtt restarted, now plan for LLE angio +/- PTA to be performed by Dr. Omalley.         Plan:  -plan for LLE angio +/- PTA via FA  -patient seen and examined  -confirmed appropriate NPO duration  -ECG and Labs reviewed  -Aspirin 81mg po pre-cath  -Heparin gtt stopped on call to procedure room  -procedure discussed with patient; risks and benefits explained, questions answered  -consent obtained by attending                                                                                Department of Cardiology                                                                  [facility]/[place] [address] [address] [facility]                                                            Telephone: [phone]. Fax:[phone]                                                                             Pre/Post Procedure H + P/Progress Note      HPI:  86yo male with h/o ESRD on HD (M/F), anemia, CHFpEF, arrythmia, HTN, HLD, CAD, a-fib and LLE DVT (used to be on Flecainide and no longer on AC due to GI bleed), AS s/p TAVR, and PAD (RLE fem-pop bypass, revised LLE fem-pop bypass, 2021, CFA to AT with cryovein ) presents with red, swollen painful left leg worsening for 1 week.  Patient was seen in the office by Dr. Mason.  A bypass duplex was performed and it was noted that the left bypass graft was thrombosed.  The plan at that time was to schedule an angiogram and recall patient, but the patient pain has been difficult to control at home.  No reports of any recent Gi blood loss, no chest pain, no sob, fever or chills    S/P LLE diagnostic angio on  with occluded SFA and pop, now POD#4 s/p L fem to AT bypass with cryovein c/b acute graft rethrombosis and s/p thrombectomy, currently with severe LLE pain concerning for CLI, LLE discoloration and modeled, +necrotic lesions left toes, dressing in place left foot, heparin gtt restarted, now plan for LLE angio +/- PTA to be performed by Dr. Omalley.     Arrived to [facility] area in severe 10/10 LLE pain, given dilaudid 0.5mg IV X 1 followed by Fentanyl 25mcg IV X 2 with improvement.        Symptoms:   Cedar class 6  Ilana stage IV           Risk Assessments:  ASA: 3  Mallampati: 2  Bleeding Risk: 11.3%  Creatinine: 4.13  GFR: 13    Associated Risk Factors:        Frailty assessment: severe frailty       Cerebrovascular Disease: yes, carotid atherosclerosis       Chronic Lung Disease: N/A       Peripheral Arterial Disease: yes, prior LE bypass and PTA       Chronic Kidney Disease (if yes, what is GFR): ESRD on HD       Uncontrolled Diabetes (if yes, what is HgbA1C or FBS): N/A       Poorly Controlled Hypertension (if yes, what is SBP): N/A       Morbid Obesity (if yes, what is BMI): N/A       History of Recent Ventricular Arrhythmia: N/A       Inability to Ambulate Safely: N/A       Need for Therapeutic Anticoagulation: yes       Antiplatelet or Contrast Allergy: N/A    	  MEDICATIONS:  isosorbide   mononitrate ER Tablet (IMDUR) 30 milliGRAM(s) Oral daily  NIFEdipine XL 90 milliGRAM(s) Oral daily  tamsulosin 0.4 milliGRAM(s) Oral at bedtime  torsemide 20 milliGRAM(s) Oral daily  ceFAZolin   IVPB 1000 milliGRAM(s) IV Intermittent every 24 hours  acetaminophen     Tablet .. 975 milliGRAM(s) Oral every 6 hours PRN  DULoxetine 60 milliGRAM(s) Oral daily  HYDROmorphone   Tablet 2 milliGRAM(s) Oral every 4 hours PRN  HYDROmorphone  Injectable 0.5 milliGRAM(s) IV Push every 4 hours PRN  melatonin 5 milliGRAM(s) Oral at bedtime  melatonin 3 milliGRAM(s) Oral at bedtime  ondansetron Injectable 4 milliGRAM(s) IV Push every 6 hours PRN  aluminum hydroxide/magnesium hydroxide/simethicone Suspension 30 milliLiter(s) Oral every 8 hours PRN  pantoprazole    Tablet 40 milliGRAM(s) Oral before breakfast  atorvastatin 80 milliGRAM(s) Oral at bedtime  aspirin  chewable 81 milliGRAM(s) Oral daily  chlorhexidine 2% Cloths 1 Application(s) Topical <User Schedule>  epoetin juan-epbx (RETACRIT) Injectable 09174 Unit(s) IV Push <User Schedule>  heparin   Injectable 5500 Unit(s) IV Push every 6 hours PRN  heparin   Injectable 2500 Unit(s) IV Push every 6 hours PRN  heparin  Infusion.  Unit(s)/Hr IV Continuous <Continuous>  multivitamin 1 Tablet(s) Oral daily        ROS: as stated above, otherwise negative    PHYSICAL EXAM:  Constitutional: A & O x 3, frail  HEENT:   Normal oral mucosa, PERRL, EOMI	  Cardiovascular: Normal S1 S2, No JVD, No murmurs  Respiratory: Lungs clear to auscultation	  Gastrointestinal:  Soft, Non-tender, + BS	  Skin: No rashes  Neurologic: Non-focal  Extremities: Normal range of motion, No clubbing, cyanosis or edema  Vascular: LLE below knee with discoloration and modeling, left toes with necrotic lesions and dressing on left foot lesion. no distal pulses LLE       T(C): 36.8 (22 @ 06:00), Max: 36.8 (22 @ 22:33)  HR: 73 (22 @ 06:00) (62 - 82)  BP: 125/56 (22 @ 06:00) (104/44 - 136/52)  RR: 18 (22 @ 06:00) (18 - 19)  SpO2: 100% (22 @ 06:00) (95% - 100%)  Wt 165kg      I&O's Summary    2022 07:01  -  2022 07:00  --------------------------------------------------------  IN: 948 mL / OUT: 0 mL / NET: 948 mL      Daily Weight in k.4 (2022 06:00)    TELEMETRY: Paced	      ECG: Paced    LABS:	 	                          8.7    5.98  )-----------( 250      ( 2022 05:28 )             28.1     04-20    135  |  93<L>  |  38.5<H>  ----------------------------<  126<H>  4.6   |  23.0  |  4.13<H>    Ca    8.7      2022 05:28  Phos  4.7     04-20  Mg     1.9     04-20      Impression:  86yo male with extensive vasculopathy, presented with occluded SFA and pop, now POD#4 s/p L fem to AT bypass with cryovein c/b acute graft rethrombosis and s/p thrombectomy, currently with severe LLE pain concerning for CLI, LLE discoloration and modeled, +necrotic lesions left toes, dressing in place left foot, heparin gtt restarted, now plan for LLE angio +/- PTA to be performed by Dr. Omalley.         Plan:  -plan for LLE angio +/- PTA via FA  -patient seen and examined  -confirmed appropriate NPO duration  -ECG and Labs reviewed  -no IVH given ESRD on HD  -Aspirin 81mg po pre-cath  -Heparin gtt stopped on call to procedure room  -procedure discussed with patient; risks and benefits explained, questions answered  -consent obtained by attending

## 2022-04-20 NOTE — PROGRESS NOTE ADULT - ASSESSMENT
Assessment: 86 yo M with rest pain, Ischemic left lower extremity, chronic. Secondary to failed distal bypass graft. S/P LLE diagnostic angio on 4/11 with occluded SFA and pop, now POD#4 s/p L fem to AT bypass with cryovein c/b acute graft rethrombosis, now s/p thrombectomy. Progressing well, carrera removed, continue on HD.   Requires to be DC on lovenox, due to history of GIB with oral AC, however, given acute change in signal was restarted on heparin gtt qith improved signals around the knee.     Plan:   - Cont with knee immobilizer  - angiogram today  - heparin gtt, full AC  - pain control  - Neurovascular checks Q4 of the left lower extremity: doppler signals  at the knee.

## 2022-04-20 NOTE — BRIEF OPERATIVE NOTE - COMMENTS
Wound class 1  Patient with hematoma over the tunnel for the bypass.  Light compression  doppler signal to the L AT
No complications  Wound Class 1  Closed with Minx
WC I

## 2022-04-20 NOTE — BRIEF OPERATIVE NOTE - NSICDXBRIEFPROCEDURE_GEN_ALL_CORE_FT
PROCEDURES:  Thrombectomy, bypass graft 14-Apr-2022 16:58:25  Aimee Rosado  
PROCEDURES:  Angiogram, lower extremity, left 11-Apr-2022 09:25:03 thrombectomy, angioplasty Jose Antonio Mendez  
PROCEDURES:  Creation of femoral-tibial artery bypass with vein graft 13-Apr-2022 18:50:16 LEFT Fem-AT bypass with Jose Antonio Anguiano  
PROCEDURES:  Angiogram, lower extremity, left 11-Apr-2022 09:25:03  Jose Antonio Mendez

## 2022-04-20 NOTE — BRIEF OPERATIVE NOTE - OPERATION/FINDINGS
Femoral artery with calcification  Cryovein utilized for bypass to AT.  AT with calcification as well.   Good inflow noted at the beginning and end of the case.   Distal AT with audible doppler signal.  Tunnel with hematoma at the end of the case.
Reopened the distal bypass incision. Cut down over superior thigh. Clot encountered overlaying bypass. Vein incised and clot removed. Moreno catheter passed through and clot removed. Good forward and back flow.
Occluded LEFT SFA and Popliteal artery with reconstitution of AT and PT with collaterals.
bypass down before procedure, right groin retrograde access, up and over, 7 long [place] sheath, penumbra device for thrombectomy, balloon angioplasty with coyote balloon. Mynx device used, upon completion palpable bypass, good AT signal

## 2022-04-21 NOTE — PHYSICAL THERAPY INITIAL EVALUATION ADULT - DIAGNOSIS, PT EVAL
decreased mobility due to decreased strength and balance
decreased mobility due to decreased strength and balance

## 2022-04-21 NOTE — PHYSICAL THERAPY INITIAL EVALUATION ADULT - ADDITIONAL COMMENTS
per patient he owns and uses a RW, requires assistance for most ADLs. He has a ramp to enter the home.
per patient he owns and uses a RW, requires assistance for most ADLs. He has a ramp to enter the home.

## 2022-04-21 NOTE — PROGRESS NOTE ADULT - ASSESSMENT
Assessment: 88 yo M with rest pain, Ischemic left lower extremity, chronic. Secondary to failed distal bypass graft. S/P LLE diagnostic angio on 4/11 with occluded SFA and pop, now POD#4 s/p L fem to AT bypass with cryovein c/b acute graft rethrombosis, now s/p thrombectomy. Progressing well, carrera removed, continue on HD.   Requires to be DC on lovenox, due to history of GIB with oral AC. Now with good signals after angio.    Plan:   - Cont with knee immobilizer  - heparin gtt, full AC, possible transition to Lovenox for discharge planning.  - PT, avoid pressure over the graft (Lateral left leg)  - pain control  - Neurovascular checks Q4 of the left lower extremity: Palpable graft and doppler AT/DP in the LLE.

## 2022-04-21 NOTE — PHYSICAL THERAPY INITIAL EVALUATION ADULT - PLANNED THERAPY INTERVENTIONS, PT EVAL
balance training/bed mobility training/gait training/ROM/strengthening/transfer training
balance training/bed mobility training/gait training/strengthening/transfer training

## 2022-04-21 NOTE — PHYSICAL THERAPY INITIAL EVALUATION ADULT - ACTIVE RANGE OF MOTION EXAMINATION, REHAB EVAL
left LE pt able to move toes. further exam limited by knee immobilizer/bilateral upper extremity Active ROM was WFL (within functional limits)/Right LE Active ROM was WFL (within functional limits)
left LE exam limited by knee immobilizer in place. left foot with noted plantarflexion contracture./bilateral upper extremity Active ROM was WFL (within functional limits)/Right LE Active ROM was WFL (within functional limits)

## 2022-04-21 NOTE — PHYSICAL THERAPY INITIAL EVALUATION ADULT - CRITERIA FOR SKILLED THERAPEUTIC INTERVENTIONS
impairments found/functional limitations in following categories/risk reduction/prevention/rehab potential/therapy frequency/anticipated equipment needs at discharge/anticipated discharge recommendation
impairments found/functional limitations in following categories/risk reduction/prevention/rehab potential/therapy frequency/anticipated equipment needs at discharge/anticipated discharge recommendation

## 2022-04-21 NOTE — PHYSICAL THERAPY INITIAL EVALUATION ADULT - PRECAUTIONS/LIMITATIONS, REHAB EVAL
avoid pressure to left lateral leg/fall precautions/oxygen therapy device and L/min
fall precautions

## 2022-04-21 NOTE — PHYSICAL THERAPY INITIAL EVALUATION ADULT - IMPAIRMENTS FOUND, PT EVAL
gait, locomotion, and balance/muscle strength/ROM
aerobic capacity/endurance/cognitive impairment/gait, locomotion, and balance/muscle strength/ROM

## 2022-04-21 NOTE — PHYSICAL THERAPY INITIAL EVALUATION ADULT - GROSSLY INTACT, SENSORY
pt with absent left foot sensation. discussed with nurse./Left UE/Right UE/Right LE/Grossly Intact
left LE exam deferred due to pain and confusion/Left UE/Right LE/Grossly Intact

## 2022-04-21 NOTE — PHYSICAL THERAPY INITIAL EVALUATION ADULT - LEVEL OF INDEPENDENCE: GAIT, REHAB EVAL
pt unable to weightshift to take a step/unable to perform
safety concerns due to pain and confusion/unable to perform

## 2022-04-21 NOTE — PHYSICAL THERAPY INITIAL EVALUATION ADULT - GENERAL OBSERVATIONS, REHAB EVAL
awake in bed on 2L/min o2 via nc, +telemonitor with , +left knee immobilizer
awake in bed on O2 via nc, +telemonitor with , +heparin drip, +left knee immobilizer. daughter, brie, present

## 2022-04-21 NOTE — PHYSICAL THERAPY INITIAL EVALUATION ADULT - PERTINENT HX OF CURRENT PROBLEM, REHAB EVAL
86 yo M with rest pain, Ischemic left lower extremity, chronic. Secondary to failed distal bypass graft. S/P LLE diagnostic angio on 4/11 with occluded SFA and pop, now POD#4 s/p L fem to AT bypass with cryovein c/b acute graft rethrombosis, now s/p thrombectomy. POD 1 s/p new angiogram of the LLE with angioplasty of the bypass with good results.

## 2022-04-21 NOTE — PHYSICAL THERAPY INITIAL EVALUATION ADULT - LEVEL OF INDEPENDENCE: STAND/SIT, REHAB EVAL
pt moderate assist x2 with RW. pt unable to maintain upright due to decreased weightshifting ability. pt with posterior lean. pt maximum assist x1 assisting from front blocking knees.
safety concerns due to pain and confusion/unable to perform

## 2022-04-21 NOTE — PROGRESS NOTE ADULT - SUBJECTIVE AND OBJECTIVE BOX
Subjective: Patient examined at bedside this AM. Reports he is feeling good. Only complaint of an occasional twitching in leg but otherwise no pain or discomfort. Has been eating well without issue. No acute events overnight.    Physical Exam:  General: alert and oriented, NAD  Resp: airway patent, respirations unlabored on NC  CVS: regular rate and rhythm  Extremities: no edema, bypass palpable distally, strong LLE AT  Skin: warm, dry, appropriate color    MEDICATIONS  (STANDING):  aspirin  chewable 81 milliGRAM(s) Oral daily  atorvastatin 80 milliGRAM(s) Oral at bedtime  cadexomer iodine 0.9% Gel 1 Application(s) Topical two times a day  ceFAZolin   IVPB 1000 milliGRAM(s) IV Intermittent every 24 hours  chlorhexidine 2% Cloths 1 Application(s) Topical <User Schedule>  DULoxetine 60 milliGRAM(s) Oral daily  epoetin juan-epbx (RETACRIT) Injectable 00019 Unit(s) IV Push <User Schedule>  heparin  Infusion.  Unit(s)/Hr (12 mL/Hr) IV Continuous <Continuous>  isosorbide   mononitrate ER Tablet (IMDUR) 30 milliGRAM(s) Oral daily  melatonin 3 milliGRAM(s) Oral at bedtime  melatonin 5 milliGRAM(s) Oral at bedtime  multivitamin 1 Tablet(s) Oral daily  NIFEdipine XL 90 milliGRAM(s) Oral daily  pantoprazole    Tablet 40 milliGRAM(s) Oral before breakfast  sevelamer carbonate 800 milliGRAM(s) Oral three times a day  tamsulosin 0.4 milliGRAM(s) Oral at bedtime  torsemide 20 milliGRAM(s) Oral daily    MEDICATIONS  (PRN):  acetaminophen     Tablet .. 975 milliGRAM(s) Oral every 6 hours PRN Mild Pain (1 - 3)  aluminum hydroxide/magnesium hydroxide/simethicone Suspension 30 milliLiter(s) Oral every 8 hours PRN Dyspepsia  heparin   Injectable 5500 Unit(s) IV Push every 6 hours PRN For aPTT less than 40  heparin   Injectable 2500 Unit(s) IV Push every 6 hours PRN For aPTT between 40 - 57  HYDROmorphone   Tablet 2 milliGRAM(s) Oral every 4 hours PRN Moderate Pain (4 - 6)  HYDROmorphone  Injectable 0.5 milliGRAM(s) IV Push every 4 hours PRN Severe Pain (7 - 10)  ondansetron Injectable 4 milliGRAM(s) IV Push every 6 hours PRN Nausea    Hemoglobin: 9.0 g/dL (04.22.22 @ 05:29)   Historical Values  Hemoglobin: 9.0 g/dL (04.22.22 @ 05:29)   Hemoglobin: 7.3 g/dL (04.21.22 @ 07:17)   Hemoglobin: 8.7 g/dL (04.20.22 @ 05:28)   Hemoglobin: 8.0 g/dL (04.19.22 @ 07:44)   Hemoglobin: 7.9 g/dL (04.19.22 @ 03:10)   Hemoglobin: 8.6 g/dL (04.18.22 @ 05:59)   Hemoglobin: 9.1 g/dL (04.17.22 @ 07:46)   Hemoglobin: 8.1 g/dL (04.16.22 @ 07:15)   Hemoglobin: 7.6 g/dL     (Now s/p LLE angio via RFA with occluded vein graft and AT s/p successful mechanical thrombectomy and PTA of graft site/AT, procedure performed by Dr. Omalley, received heparin and IV sedation intraprocedurally, arrived to recovery in NAD and HDS, RFA closed with Mynx device, access site stable, no bleed/hematoma, distal pulse + by doppler. Plan to transfer back to [facility] once post PTA recovery completed.     Plan:    -Access site precautions  -Restart heparin gtt at previous rate with NO bolus and follow heparin protocol  -Continue current medical therapy         Assessment: 86 yo M with rest pain, Ischemic left lower extremity, chronic. Secondary to failed distal bypass graft. S/P LLE diagnostic angio on 4/11 with occluded SFA and pop, s/p L fem to AT bypass with cryovein on 4/13 c/b acute graft rethrombosis, s/p thrombectomy on 4/14. Post-op complicated by rethrombosis of graft requiring angio with thrombectomy and balloon angioplasty on 4/18. Recovering well     - Cont with knee immobilizer  - heparin gtt, full AC, possible transition to Lovenox for discharge planning.  - PT, avoid pressure over the graft (Lateral left leg)  - pain control  - Neurovascular checks Q4 of the left lower extremity: Palpable graft and doppler AT/DP in the LLE.   - PT recommending [facility] vs home with 24/7 assistance,

## 2022-04-21 NOTE — PHYSICAL THERAPY INITIAL EVALUATION ADULT - MANUAL MUSCLE TESTING RESULTS, REHAB EVAL
bilateral shoulder flex, elbow flex WFL, right hip flex, knee ext, ankle df WFL; left LE-pt able to move toes, otherwise testing deferred by left knee immobilizer
bilateral shoulder flex, elbow flex WFL; right hip flex 3+/5, knee ext 3+/5, ankle df 4/5; left LE exam limited by knee immobilizer, no active left ankle df noted

## 2022-04-21 NOTE — PHYSICAL THERAPY INITIAL EVALUATION ADULT - LEVEL OF INDEPENDENCE: SIT/STAND, REHAB EVAL
safety concerns due to pain and confusion/unable to perform
pt moderate assist x2 with RW. pt unable to maintain upright due to decreased weightshifting ability. pt with posterior lean. pt maximum assist x1 assisting from front blocking knees.

## 2022-04-21 NOTE — PROGRESS NOTE ADULT - SUBJECTIVE AND OBJECTIVE BOX
INTERVAL HPI/OVERNIGHT EVENTS:    Patient evaluated at bedside. No acute distress. No acute events overnight. POD 1 s/p new angiogram of the LLE with angioplasty of the bypass with good results. Patient reports feeling well without pain. Denies fevers, chills, nausea, emesis.  Denies chest pain, dyspnea.  Denies constipation, diarrhea. Denies headaches, dizziness, changes in vision. Remains hemodynamically stable and afebrile.     MEDICATIONS  (STANDING):  aspirin  chewable 81 milliGRAM(s) Oral daily  atorvastatin 80 milliGRAM(s) Oral at bedtime  ceFAZolin   IVPB 1000 milliGRAM(s) IV Intermittent every 24 hours  chlorhexidine 2% Cloths 1 Application(s) Topical <User Schedule>  DULoxetine 60 milliGRAM(s) Oral daily  epoetin juan-epbx (RETACRIT) Injectable 72825 Unit(s) IV Push <User Schedule>  heparin  Infusion.  Unit(s)/Hr (12 mL/Hr) IV Continuous <Continuous>  isosorbide   mononitrate ER Tablet (IMDUR) 30 milliGRAM(s) Oral daily  melatonin 3 milliGRAM(s) Oral at bedtime  melatonin 5 milliGRAM(s) Oral at bedtime  multivitamin 1 Tablet(s) Oral daily  NIFEdipine XL 90 milliGRAM(s) Oral daily  pantoprazole    Tablet 40 milliGRAM(s) Oral before breakfast  sevelamer carbonate 800 milliGRAM(s) Oral three times a day  tamsulosin 0.4 milliGRAM(s) Oral at bedtime  torsemide 20 milliGRAM(s) Oral daily    MEDICATIONS  (PRN):  acetaminophen     Tablet .. 975 milliGRAM(s) Oral every 6 hours PRN Mild Pain (1 - 3)  aluminum hydroxide/magnesium hydroxide/simethicone Suspension 30 milliLiter(s) Oral every 8 hours PRN Dyspepsia  heparin   Injectable 5500 Unit(s) IV Push every 6 hours PRN For aPTT less than 40  heparin   Injectable 2500 Unit(s) IV Push every 6 hours PRN For aPTT between 40 - 57  HYDROmorphone   Tablet 2 milliGRAM(s) Oral every 4 hours PRN Moderate Pain (4 - 6)  HYDROmorphone  Injectable 0.5 milliGRAM(s) IV Push every 4 hours PRN Severe Pain (7 - 10)  ondansetron Injectable 4 milliGRAM(s) IV Push every 6 hours PRN Nausea      Vital Signs Last 24 Hrs  T(C): 37.1 (21 Apr 2022 04:57), Max: 37.1 (21 Apr 2022 04:57)  T(F): 98.8 (21 Apr 2022 04:57), Max: 98.8 (21 Apr 2022 04:57)  HR: 92 (21 Apr 2022 04:57) (60 - 92)  BP: 123/65 (21 Apr 2022 04:57) (107/46 - 125/62)  BP(mean): --  RR: 17 (21 Apr 2022 04:57) (13 - 18)  SpO2: 97% (21 Apr 2022 04:57) (93% - 100%)    Physical Exam:   GEN: patient resting comfortably in bed, in no acute distress  RESP: respirations are unlabored, no accessory muscle use, no conversational dyspnea  CVS: RRR  GI: Abdomen soft, non-tender, non-distended, no rebound tenderness / guarding  LLE:  Palpable graft throughout the tract, biphasic doppler AT/DP signals.  Demarcating 4th and 5th toes on the L. Groin with clean incision and weakly palpable femoral pulse.       I&O's Detail    19 Apr 2022 07:01  -  20 Apr 2022 07:00  --------------------------------------------------------  IN:    Heparin Infusion: 228 mL    Oral Fluid: 720 mL  Total IN: 948 mL    OUT:  Total OUT: 0 mL    Total NET: 948 mL      20 Apr 2022 07:01  -  21 Apr 2022 06:44  --------------------------------------------------------  IN:    Heparin Infusion: 156 mL    Oral Fluid: 150 mL  Total IN: 306 mL    OUT:  Total OUT: 0 mL    Total NET: 306 mL          LABS:                        8.7    5.98  )-----------( 250      ( 20 Apr 2022 05:28 )             28.1     04-20    135  |  93<L>  |  38.5<H>  ----------------------------<  126<H>  4.6   |  23.0  |  4.13<H>    Ca    8.7      20 Apr 2022 05:28  Phos  4.7     04-20  Mg     1.9     04-20      PT/INR - ( 20 Apr 2022 07:45 )   PT: 14.5 sec;   INR: 1.25 ratio         PTT - ( 20 Apr 2022 07:45 )  PTT:72.7 sec      RADIOLOGY & ADDITIONAL STUDIES:

## 2022-04-21 NOTE — PHYSICAL THERAPY INITIAL EVALUATION ADULT - PASSIVE RANGE OF MOTION EXAMINATION, REHAB EVAL
left LE exam deferred by pain and confusion/bilateral upper extremity Passive ROM was WFL (within functional limits)/Right LE Passive ROM was WFL (within functional limits)
left LE exam limited by knee immobilizer in place. left foot with noted plantarflexion contracture./bilateral upper extremity Passive ROM was WFL (within functional limits)/Right LE Passive ROM was WFL (within functional limits)

## 2022-04-21 NOTE — PHYSICAL THERAPY INITIAL EVALUATION ADULT - NEUROVASCULAR ASSESSMENT LLE
darkened 3-5th digits/exam deferred due to c/o pain and confusion
absent sensation left foot. discussed with nurse./exam limited by knee immobilizer; darkened 3rd-5th digits/warm

## 2022-04-22 NOTE — PROGRESS NOTE ADULT - ASSESSMENT
Assessment: 88 yo M with rest pain, Ischemic left lower extremity, chronic. Secondary to failed distal bypass graft. S/P LLE diagnostic angio on 4/11 with occluded SFA and pop, s/p L fem to AT bypass with cryovein on 4/13 c/b acute graft rethrombosis, s/p thrombectomy on 4/14. Post-op complicated by rethrombosis of graft requiring angio with thrombectomy and balloon angioplasty on 4/18. Recovering well     Plan:   - Cont with knee immobilizer  - heparin gtt, full AC, possible transition to Lovenox for discharge planning.  - PT, avoid pressure over the graft (Lateral left leg)  - pain control  - Neurovascular checks Q4 of the left lower extremity: Palpable graft and doppler AT/DP in the LLE.   - PT recommending [facility] vs home with 24/7 assist  - CM for dispo planning

## 2022-04-22 NOTE — PROGRESS NOTE ADULT - SUBJECTIVE AND OBJECTIVE BOX
VASCULAR SURGERY PROGRESS NOTE    Subjective: Patient examined at bedside this AM. Reports he is feeling good. Only complaint of an occasional twitching in leg but otherwise no pain or discomfort. Has been eating well without issue. No acute events overnight.    Objective:  Vital Signs  T(C): 36.4 (04-22 @ 05:30), Max: 37.8 (04-21 @ 09:25)  HR: 75 (04-22 @ 05:30) (71 - 80)  BP: 138/71 (04-22 @ 05:30) (97/64 - 138/71)  RR: 17 (04-22 @ 05:30) (17 - 19)  SpO2: 92% (04-22 @ 05:30) (92% - 97%)      Physical Exam:  General: alert and oriented, NAD  Resp: airway patent, respirations unlabored on NC  CVS: regular rate and rhythm  Extremities: no edema, bypass palpable distally, strong LLE AT  Skin: warm, dry, appropriate color    Labs:                        9.0    8.07  )-----------( 279      ( 22 Apr 2022 05:29 )             29.0   04-22    134<L>  |  92<L>  |  61.7<H>  ----------------------------<  189<H>  4.6   |  21.0<L>  |  5.66<H>    Ca    8.5<L>      22 Apr 2022 05:29  Phos  6.6     04-22  Mg     1.9     04-22      CAPILLARY BLOOD GLUCOSE          Medications:   MEDICATIONS  (STANDING):  aspirin  chewable 81 milliGRAM(s) Oral daily  atorvastatin 80 milliGRAM(s) Oral at bedtime  cadexomer iodine 0.9% Gel 1 Application(s) Topical two times a day  ceFAZolin   IVPB 1000 milliGRAM(s) IV Intermittent every 24 hours  chlorhexidine 2% Cloths 1 Application(s) Topical <User Schedule>  DULoxetine 60 milliGRAM(s) Oral daily  epoetin juan-epbx (RETACRIT) Injectable 25354 Unit(s) IV Push <User Schedule>  heparin  Infusion.  Unit(s)/Hr (12 mL/Hr) IV Continuous <Continuous>  isosorbide   mononitrate ER Tablet (IMDUR) 30 milliGRAM(s) Oral daily  melatonin 3 milliGRAM(s) Oral at bedtime  melatonin 5 milliGRAM(s) Oral at bedtime  multivitamin 1 Tablet(s) Oral daily  NIFEdipine XL 90 milliGRAM(s) Oral daily  pantoprazole    Tablet 40 milliGRAM(s) Oral before breakfast  sevelamer carbonate 800 milliGRAM(s) Oral three times a day  tamsulosin 0.4 milliGRAM(s) Oral at bedtime  torsemide 20 milliGRAM(s) Oral daily    MEDICATIONS  (PRN):  acetaminophen     Tablet .. 975 milliGRAM(s) Oral every 6 hours PRN Mild Pain (1 - 3)  aluminum hydroxide/magnesium hydroxide/simethicone Suspension 30 milliLiter(s) Oral every 8 hours PRN Dyspepsia  heparin   Injectable 5500 Unit(s) IV Push every 6 hours PRN For aPTT less than 40  heparin   Injectable 2500 Unit(s) IV Push every 6 hours PRN For aPTT between 40 - 57  HYDROmorphone   Tablet 2 milliGRAM(s) Oral every 4 hours PRN Moderate Pain (4 - 6)  HYDROmorphone  Injectable 0.5 milliGRAM(s) IV Push every 4 hours PRN Severe Pain (7 - 10)  ondansetron Injectable 4 milliGRAM(s) IV Push every 6 hours PRN Nausea

## 2022-04-22 NOTE — PROGRESS NOTE ADULT - SUBJECTIVE AND OBJECTIVE BOX
Patient was seen and evaluated on dialysis.   Bp low- UF goal decreased to 0.5 kg  Tolerated lower UF goal  T(C): 36.6 (04-22-22 @ 08:26), Max: 37.8 (04-21-22 @ 12:44)  HR: 73 (04-22-22 @ 08:26) (71 - 80)  BP: 117/42 (04-22-22 @ 08:26) (114/50 - 138/71)  Continue dialysis:   Dialyzer: revaclear 300  QB: 400 ml QD: 500ml.,  Goal UF 0.5 kg over 3.5  Hours

## 2022-04-22 NOTE — PHARMACOTHERAPY INTERVENTION NOTE - COMMENTS
Recommended adding time of administration to enoxaparin order to be given post-HD within 1 hour after discontinuation of heparin gtt.
HD dosing

## 2022-04-22 NOTE — CHART NOTE - NSCHARTNOTEFT_GEN_A_CORE
Source: Patient [ ]  Family [ ]   other [x ]    Current Diet: Diet, Renal Restrictions:   For patients receiving Renal Replacement - No Protein Restr, No Conc K, No Conc Phos, Low Sodium  Supplement Feeding Modality:  Oral  Nepro Cans or Servings Per Day:  3       Frequency:  Three Times a day (04-21-22 @ 08:27)    PO intake:   > 50% [ ]   50-75%  [x ]   %  [ ]  other :    Current Weight:   (4/22) 146.3 lbs  (4/20) 146.3 lbs  (4/15) 155.6 lbs  (4/6) 149 lbs    % Weight Change - wt fluctuation noted, will continue to monitor for accuracy.  Awaer pt with 2+ mild edema left foot/ankle noted per documentation.    Pertinent Medications: MEDICATIONS  (STANDING):  aspirin  chewable 81 milliGRAM(s) Oral daily  atorvastatin 80 milliGRAM(s) Oral at bedtime  cadexomer iodine 0.9% Gel 1 Application(s) Topical two times a day  ceFAZolin   IVPB 1000 milliGRAM(s) IV Intermittent every 24 hours  chlorhexidine 2% Cloths 1 Application(s) Topical <User Schedule>  DULoxetine 60 milliGRAM(s) Oral daily  enoxaparin Injectable 68 milliGRAM(s) SubCutaneous every 24 hours  epoetin juan-epbx (RETACRIT) Injectable 60006 Unit(s) IV Push <User Schedule>  heparin  Infusion.  Unit(s)/Hr (12 mL/Hr) IV Continuous <Continuous>  isosorbide   mononitrate ER Tablet (IMDUR) 30 milliGRAM(s) Oral daily  melatonin 3 milliGRAM(s) Oral at bedtime  melatonin 5 milliGRAM(s) Oral at bedtime  multivitamin 1 Tablet(s) Oral daily  NIFEdipine XL 90 milliGRAM(s) Oral daily  pantoprazole    Tablet 40 milliGRAM(s) Oral before breakfast  sevelamer carbonate 800 milliGRAM(s) Oral three times a day  tamsulosin 0.4 milliGRAM(s) Oral at bedtime  torsemide 20 milliGRAM(s) Oral daily    MEDICATIONS  (PRN):  acetaminophen     Tablet .. 975 milliGRAM(s) Oral every 6 hours PRN Mild Pain (1 - 3)  aluminum hydroxide/magnesium hydroxide/simethicone Suspension 30 milliLiter(s) Oral every 8 hours PRN Dyspepsia  heparin   Injectable 5500 Unit(s) IV Push every 6 hours PRN For aPTT less than 40  heparin   Injectable 2500 Unit(s) IV Push every 6 hours PRN For aPTT between 40 - 57  HYDROmorphone   Tablet 2 milliGRAM(s) Oral every 4 hours PRN Moderate Pain (4 - 6)  HYDROmorphone  Injectable 0.5 milliGRAM(s) IV Push every 4 hours PRN Severe Pain (7 - 10)  ondansetron Injectable 4 milliGRAM(s) IV Push every 6 hours PRN Nausea    Pertinent Labs: CBC Full  -  ( 22 Apr 2022 05:29 )  WBC Count : 8.07 K/uL  RBC Count : 2.91 M/uL  Hemoglobin : 9.0 g/dL  Hematocrit : 29.0 %  Platelet Count - Automated : 279 K/uL  Mean Cell Volume : 99.7 fl  Mean Cell Hemoglobin : 30.9 pg  Mean Cell Hemoglobin Concentration : 31.0 gm/dL  Auto Neutrophil # : 6.80 K/uL  Auto Lymphocyte # : 0.21 K/uL  Auto Monocyte # : 0.56 K/uL  Auto Eosinophil # : 0.21 K/uL  Auto Basophil # : 0.21 K/uL  Auto Neutrophil % : 84.3 %  Auto Lymphocyte % : 2.6 %  Auto Monocyte % : 7.0 %  Auto Eosinophil % : 2.6 %  Auto Basophil % : 2.6 %  04-22 Na134 mmol/L<L> Glu 189 mg/dL<H> K+ 4.6 mmol/L Cr  5.66 mg/dL<H> BUN 61.7 mg/dL<H> Phos 6.6 mg/dL<H> Alb n/a   PAB n/a       Skin: sx wound left leg    Nutrition focused physical exam previously conducted - found signs of malnutrition [ ]absent [x ]present    Subcutaneous fat loss: [x ] Orbital fat pads region, [x ]Buccal fat region, [ ]Triceps region,  [ ]Ribs region    Muscle wasting: [ x]Temples region, [x ]Clavicle region, [c ]Shoulder region, [ ]Scapula region, [ ]Interosseous region,  [ ]thigh region, [ ]Calf region    Current Nutrition Diagnosis: Pt remains at nutrition risk secondary to severe protein calorie malnutrition (chronic) related to inability to consume sufficient protein energy intake in setting of extensive medical hx and multiple hospitalizations  as evidenced by pt with severe muscle wasting/fat loss; likely meeting <75% estimated energy intake > 1 month.   Pt is s/p L fem to AT bypass with cryovein c/b acute graft rethrombosis, now s/p thrombectomy.  Pt currently off the unit; continues with good po intake at meals per physician note.  Aware pt normally consumes small portions.  RD to remain available.     Recommendations:   1. Continue with Nepro TID  2. Change MVI to Nepro-pito daily  3. Obtain daily weight and monitor trend Skin:     Monitoring and Evaluation:   x ] PO intake [x ] Tolerance to diet prescription [X] Weights  [X] Follow up per protocol [X] Labs: Source: Patient [ ]  Family [ ]   other [x ]    Current Diet: Diet, Renal Restrictions:   For patients receiving Renal Replacement - No Protein Restr, No Conc K, No Conc Phos, Low Sodium  Supplement Feeding Modality:  Oral  Nepro Cans or Servings Per Day:  3       Frequency:  Three Times a day (04-21-22 @ 08:27)    PO intake:   > 50% [ ]   50-75%  [x ]   %  [ ]  other :    Current Weight:   (4/22) 146.3 lbs  (4/20) 146.3 lbs  (4/15) 155.6 lbs  (4/6) 149 lbs    % Weight Change - wt fluctuation noted, will continue to monitor for accuracy.  Aware pt with 2+ mild edema left foot/ankle noted per documentation.    Pertinent Medications: MEDICATIONS  (STANDING):  aspirin  chewable 81 milliGRAM(s) Oral daily  atorvastatin 80 milliGRAM(s) Oral at bedtime  cadexomer iodine 0.9% Gel 1 Application(s) Topical two times a day  ceFAZolin   IVPB 1000 milliGRAM(s) IV Intermittent every 24 hours  chlorhexidine 2% Cloths 1 Application(s) Topical <User Schedule>  DULoxetine 60 milliGRAM(s) Oral daily  enoxaparin Injectable 68 milliGRAM(s) SubCutaneous every 24 hours  epoetin juan-epbx (RETACRIT) Injectable 12983 Unit(s) IV Push <User Schedule>  heparin  Infusion.  Unit(s)/Hr (12 mL/Hr) IV Continuous <Continuous>  isosorbide   mononitrate ER Tablet (IMDUR) 30 milliGRAM(s) Oral daily  melatonin 3 milliGRAM(s) Oral at bedtime  melatonin 5 milliGRAM(s) Oral at bedtime  multivitamin 1 Tablet(s) Oral daily  NIFEdipine XL 90 milliGRAM(s) Oral daily  pantoprazole    Tablet 40 milliGRAM(s) Oral before breakfast  sevelamer carbonate 800 milliGRAM(s) Oral three times a day  tamsulosin 0.4 milliGRAM(s) Oral at bedtime  torsemide 20 milliGRAM(s) Oral daily    MEDICATIONS  (PRN):  acetaminophen     Tablet .. 975 milliGRAM(s) Oral every 6 hours PRN Mild Pain (1 - 3)  aluminum hydroxide/magnesium hydroxide/simethicone Suspension 30 milliLiter(s) Oral every 8 hours PRN Dyspepsia  heparin   Injectable 5500 Unit(s) IV Push every 6 hours PRN For aPTT less than 40  heparin   Injectable 2500 Unit(s) IV Push every 6 hours PRN For aPTT between 40 - 57  HYDROmorphone   Tablet 2 milliGRAM(s) Oral every 4 hours PRN Moderate Pain (4 - 6)  HYDROmorphone  Injectable 0.5 milliGRAM(s) IV Push every 4 hours PRN Severe Pain (7 - 10)  ondansetron Injectable 4 milliGRAM(s) IV Push every 6 hours PRN Nausea    Pertinent Labs: CBC Full  -  ( 22 Apr 2022 05:29 )  WBC Count : 8.07 K/uL  RBC Count : 2.91 M/uL  Hemoglobin : 9.0 g/dL  Hematocrit : 29.0 %  Platelet Count - Automated : 279 K/uL  Mean Cell Volume : 99.7 fl  Mean Cell Hemoglobin : 30.9 pg  Mean Cell Hemoglobin Concentration : 31.0 gm/dL  Auto Neutrophil # : 6.80 K/uL  Auto Lymphocyte # : 0.21 K/uL  Auto Monocyte # : 0.56 K/uL  Auto Eosinophil # : 0.21 K/uL  Auto Basophil # : 0.21 K/uL  Auto Neutrophil % : 84.3 %  Auto Lymphocyte % : 2.6 %  Auto Monocyte % : 7.0 %  Auto Eosinophil % : 2.6 %  Auto Basophil % : 2.6 %  04-22 Na134 mmol/L<L> Glu 189 mg/dL<H> K+ 4.6 mmol/L Cr  5.66 mg/dL<H> BUN 61.7 mg/dL<H> Phos 6.6 mg/dL<H> Alb n/a   PAB n/a       Skin: sx wound left leg    Nutrition focused physical exam previously conducted - found signs of malnutrition [ ]absent [x ]present    Subcutaneous fat loss: [x ] Orbital fat pads region, [x ]Buccal fat region, [ ]Triceps region,  [ ]Ribs region    Muscle wasting: [ x]Temples region, [x ]Clavicle region, [c ]Shoulder region, [ ]Scapula region, [ ]Interosseous region,  [ ]thigh region, [ ]Calf region    Current Nutrition Diagnosis: Pt remains at nutrition risk secondary to severe protein calorie malnutrition (chronic) related to inability to consume sufficient protein energy intake in setting of extensive medical hx and multiple hospitalizations  as evidenced by pt with severe muscle wasting/fat loss; likely meeting <75% estimated energy intake > 1 month.   Pt is s/p L fem to AT bypass with cryovein c/b acute graft rethrombosis, now s/p thrombectomy.  Pt currently off the unit; continues with good po intake at meals per physician note.  Aware pt normally consumes small portions.  RD to remain available.     Recommendations:   1. Continue with Nepro TID  2. Change MVI to Nepro-pito daily  3. Obtain daily weight and monitor trend Skin:     Monitoring and Evaluation:   x ] PO intake [x ] Tolerance to diet prescription [X] Weights  [X] Follow up per protocol [X] Labs:

## 2022-04-23 NOTE — PROGRESS NOTE ADULT - SUBJECTIVE AND OBJECTIVE BOX
VASCULAR SURGERY PROGRESS NOTE    Subjective: Patient examined at bedside this AM. Reports he is feeling good. Only complaint of an occasional twitching in leg but otherwise no pain or discomfort. transitioned to lovenox, had cheesecake yesterday    Objective:  Vital Signs Last 24 Hrs  T(C): 36.8 (22 Apr 2022 20:45), Max: 36.8 (22 Apr 2022 20:45)  T(F): 98.2 (22 Apr 2022 20:45), Max: 98.2 (22 Apr 2022 20:45)  HR: 75 (23 Apr 2022 02:50) (68 - 75)  BP: 128/56 (23 Apr 2022 02:50) (112/47 - 138/71)  BP(mean): 72 (22 Apr 2022 16:15) (72 - 72)  RR: 18 (22 Apr 2022 20:45) (17 - 18)  SpO2: 94% (22 Apr 2022 20:45) (92% - 98%)    Physical Exam:  General: alert and oriented, NAD  Resp: airway patent, respirations unlabored on NC  CVS: regular rate and rhythm  Extremities: no edema, bypass palpable distally, strong LLE AT  Skin: warm, dry, appropriate color    Labs:                                         9.0    8.07  )-----------( 279      ( 22 Apr 2022 05:29 )             29.0   04-22    134<L>  |  92<L>  |  61.7<H>  ----------------------------<  189<H>  4.6   |  21.0<L>  |  5.66<H>    Ca    8.5<L>      22 Apr 2022 05:29  Phos  6.6     04-22  Mg     1.9     04-22

## 2022-04-23 NOTE — PROGRESS NOTE ADULT - ASSESSMENT
Assessment: 88 yo M with rest pain, Ischemic left lower extremity, chronic. Secondary to failed distal bypass graft. S/P LLE diagnostic angio on 4/11 with occluded SFA and pop, s/p L fem to AT bypass with cryovein on 4/13 c/b acute graft rethrombosis, s/p thrombectomy on 4/14. Post-op complicated by rethrombosis of graft requiring angio with thrombectomy and balloon angioplasty on 4/18. Recovering well     Plan:   - Cont with knee immobilizer  - AC with therpaeutic loveox, 1mg/kg qd due to CrCl  - PT, avoid pressure over the graft (Lateral left leg)  - pain control  - Neurovascular checks Q4 of the left lower extremity: Palpable graft and doppler AT/DP in the LLE.   - PT recommending [facility] vs home with 24/7 assist  - CM for dispo planning

## 2022-04-23 NOTE — PROGRESS NOTE ADULT - SUBJECTIVE AND OBJECTIVE BOX
[facility] DIVISION OF KIDNEY DISEASES AND HYPERTENSION -- HEMODIALYSIS NOTE  --------------------------------------------------------------------------------  Chief Complaint: ESRD/Ongoing hemodialysis requirement    24 hour events/subjective:  s/p HD yesterday  pt seen and examined; feels well  grandson at bed side    PAST HISTORY  --------------------------------------------------------------------------------  No significant changes to PMH, PSH, FHx, SHx, unless otherwise noted    ALLERGIES & MEDICATIONS  --------------------------------------------------------------------------------  Allergies  Plavix (Hives)  Toprol-XL (Rash)      Standing Inpatient Medications  aspirin  chewable 81 milliGRAM(s) Oral daily  atorvastatin 80 milliGRAM(s) Oral at bedtime  cadexomer iodine 0.9% Gel 1 Application(s) Topical two times a day  ceFAZolin   IVPB 1000 milliGRAM(s) IV Intermittent every 24 hours  chlorhexidine 2% Cloths 1 Application(s) Topical <User Schedule>  DULoxetine 60 milliGRAM(s) Oral daily  enoxaparin Injectable 68 milliGRAM(s) SubCutaneous every 24 hours  epoetin juan-epbx (RETACRIT) Injectable 35190 Unit(s) IV Push <User Schedule>  isosorbide   mononitrate ER Tablet (IMDUR) 30 milliGRAM(s) Oral daily  melatonin 5 milliGRAM(s) Oral at bedtime  melatonin 3 milliGRAM(s) Oral at bedtime  multivitamin 1 Tablet(s) Oral daily  NIFEdipine XL 90 milliGRAM(s) Oral daily  pantoprazole    Tablet 40 milliGRAM(s) Oral before breakfast  sevelamer carbonate 800 milliGRAM(s) Oral three times a day  tamsulosin 0.4 milliGRAM(s) Oral at bedtime  torsemide 20 milliGRAM(s) Oral daily    PRN Inpatient Medications  acetaminophen     Tablet .. 975 milliGRAM(s) Oral every 6 hours PRN  aluminum hydroxide/magnesium hydroxide/simethicone Suspension 30 milliLiter(s) Oral every 8 hours PRN  HYDROmorphone   Tablet 2 milliGRAM(s) Oral every 4 hours PRN  HYDROmorphone  Injectable 0.5 milliGRAM(s) IV Push every 4 hours PRN  ondansetron Injectable 4 milliGRAM(s) IV Push every 6 hours PRN      REVIEW OF SYSTEMS  --------------------------------------------------------------------------------  Gen: No weight changes, fatigue, fevers/chills, weakness  Skin: No rashes  Head/Eyes/Ears/Mouth: No headache  Respiratory: No dyspnea, cough,  CV: No chest pain, orthopnea  GI: No abdominal pain, diarrhea, constipation, nausea, vomiting,  MSK: No joint pain  Neuro: No dizziness/lightheadedness, weakness  Heme: No bleeding  Psych: No significant nervousness, anxiety, stress, depression    All other systems were reviewed and are negative, except as noted.    VITALS/PHYSICAL EXAM  --------------------------------------------------------------------------------  T(C): 36.6 (04-23-22 @ 08:22), Max: 36.9 (04-23-22 @ 05:45)  HR: 71 (04-23-22 @ 11:50) (70 - 79)  BP: 115/57 (04-23-22 @ 11:50) (112/50 - 130/44)  RR: 18 (04-23-22 @ 08:22) (18 - 18)  SpO2: 93% (04-23-22 @ 08:22) (91% - 97%)  Wt(kg): --        04-22-22 @ 07:01  -  04-23-22 @ 07:00  --------------------------------------------------------  IN: 0 mL / OUT: 500 mL / NET: -500 mL      Physical Exam:  	Gen: NAD  	HEENT: PERRL, supple neck,  	Pulm: CTA B/L  	CV: RRR, S1S2; no rub  	Abd: +BS, soft, nontender  	UE: Warm,  no asterixis  	LE: Warm, left leg in ace bandage  	Neuro: No focal deficits  	Psych: Normal affect and mood  	Skin: Warm  	Vascular access: AVF    LABS/STUDIES  --------------------------------------------------------------------------------              8.4    6.45  >-----------<  267      [04-23-22 @ 05:59]              27.4     137  |  98  |  36.0  ----------------------------<  85      [04-23-22 @ 05:59]  4.4   |  25.0  |  3.55        Ca     8.5     [04-23-22 @ 05:59]      Mg     1.8     [04-23-22 @ 05:59]      Phos  3.3     [04-23-22 @ 05:59]        PTT: 54.7       [04-22-22 @ 17:06]      Iron 43, TIBC 249, %sat 17      [07-13-21 @ 16:07]  Ferritin 498      [07-13-21 @ 16:07]  HbA1c 4.9      [08-09-18 @ 05:54]  Lipid: chol 126, , HDL 60, LDL --      [06-03-21 @ 06:07]    HBsAb <3.0      [04-21-22 @ 22:28]  HBsAg Nonreact      [04-21-22 @ 22:28]  HCV 0.17, Nonreact      [04-21-22 @ 22:28]

## 2022-04-23 NOTE — PROGRESS NOTE ADULT - ASSESSMENT
ESRD on HD  Ischemic left lower extremity, chronic-  s/p L fem to AT bypass with cryovein c/b acute graft rethrombosis,  s/p thrombectomy on 04/14  Post-op complicated by rethrombosis of graft requiring angio with thrombectomy and balloon angioplasty on 4/18  Anemia of CKD  HTN    Continue HD MF schedule  Hb below goal- continue AMY with HD  On Therapeutic Lovenox- monitor H/H

## 2022-04-24 NOTE — PROGRESS NOTE ADULT - SUBJECTIVE AND OBJECTIVE BOX
HPI/OVERNIGHT EVENTS: Patient seen and examined at bedside this AM. No overnight events. No complaints. Denies fever, chills, nausea, vomiting, chest pain, SOB, dizziness, abd pain or any other concerning symptoms.    Vital Signs Last 24 Hrs  T(C): 36.4 (23 Apr 2022 21:59), Max: 36.9 (23 Apr 2022 05:45)  T(F): 97.6 (23 Apr 2022 21:59), Max: 98.4 (23 Apr 2022 05:45)  HR: 75 (23 Apr 2022 21:59) (71 - 79)  BP: 143/69 (23 Apr 2022 21:59) (112/50 - 143/69)  BP(mean): --  RR: 18 (23 Apr 2022 21:59) (18 - 18)  SpO2: 100% (23 Apr 2022 21:59) (88% - 100%)    I&O's Detail    22 Apr 2022 07:01  -  23 Apr 2022 07:00  --------------------------------------------------------  IN:  Total IN: 0 mL    OUT:    Other (mL): 500 mL  Total OUT: 500 mL    Total NET: -500 mL      23 Apr 2022 07:01  -  24 Apr 2022 02:13  --------------------------------------------------------  IN:    Oral Fluid: 700 mL  Total IN: 700 mL    OUT:    Voided (mL): 250 mL  Total OUT: 250 mL    Total NET: 450 mL          Constitutional: patient resting comfortably in bed, in no acute distress  HEENT: EOMI, PERRLA, MMM.  Respiratory: Non labored breathing on RA  Cardiovascular: RRR  Gastrointestinal: Abdomen soft, non-tender, non-distended, no rebound tenderness / guarding  Musculoskeletal: No joint pain, swelling or deformity; no limitation of movement  Vascular: no edema, bypass palpable distally, strong LLE AT    LABS:                        8.4    6.45  )-----------( 267      ( 23 Apr 2022 05:59 )             27.4     04-23    137  |  98  |  36.0<H>  ----------------------------<  85  4.4   |  25.0  |  3.55<H>    Ca    8.5<L>      23 Apr 2022 05:59  Phos  3.3     04-23  Mg     1.8     04-23      PTT - ( 22 Apr 2022 17:06 )  PTT:54.7 sec      MEDICATIONS  (STANDING):  aspirin  chewable 81 milliGRAM(s) Oral daily  atorvastatin 80 milliGRAM(s) Oral at bedtime  cadexomer iodine 0.9% Gel 1 Application(s) Topical two times a day  ceFAZolin   IVPB 1000 milliGRAM(s) IV Intermittent every 24 hours  chlorhexidine 2% Cloths 1 Application(s) Topical <User Schedule>  DULoxetine 60 milliGRAM(s) Oral daily  enoxaparin Injectable 68 milliGRAM(s) SubCutaneous every 24 hours  epoetin juan-epbx (RETACRIT) Injectable 20368 Unit(s) IV Push <User Schedule>  isosorbide   mononitrate ER Tablet (IMDUR) 30 milliGRAM(s) Oral daily  melatonin 5 milliGRAM(s) Oral at bedtime  melatonin 3 milliGRAM(s) Oral at bedtime  multivitamin 1 Tablet(s) Oral daily  NIFEdipine XL 90 milliGRAM(s) Oral daily  pantoprazole    Tablet 40 milliGRAM(s) Oral before breakfast  sevelamer carbonate 800 milliGRAM(s) Oral three times a day  tamsulosin 0.4 milliGRAM(s) Oral at bedtime  torsemide 20 milliGRAM(s) Oral daily    MEDICATIONS  (PRN):  acetaminophen     Tablet .. 975 milliGRAM(s) Oral every 6 hours PRN Mild Pain (1 - 3)  aluminum hydroxide/magnesium hydroxide/simethicone Suspension 30 milliLiter(s) Oral every 8 hours PRN Dyspepsia  HYDROmorphone   Tablet 2 milliGRAM(s) Oral every 4 hours PRN Moderate Pain (4 - 6)  HYDROmorphone  Injectable 0.5 milliGRAM(s) IV Push every 4 hours PRN Severe Pain (7 - 10)  ondansetron Injectable 4 milliGRAM(s) IV Push every 6 hours PRN Nausea      MICRO:   Cultures     STUDIES:   EKG, CXR, U/S, CT, MRI

## 2022-04-24 NOTE — PROGRESS NOTE ADULT - ASSESSMENT
Assessment: 88 yo M with rest pain, Ischemic left lower extremity, chronic. Secondary to failed distal bypass graft. S/P LLE diagnostic angio on 4/11 with occluded SFA and pop, s/p L fem to AT bypass with cryovein on 4/13 c/b acute graft rethrombosis, s/p thrombectomy on 4/14. Post-op complicated by rethrombosis of graft requiring angio with thrombectomy and balloon angioplasty on 4/18. Recovering well     Plan:   - Cont with knee immobilizer  - AC with therpaeutic loveox, 1mg/kg qd due to CrCl  - PT, avoid pressure over the graft (Lateral left leg)  - pain control  - Neurovascular checks Q4 of the left lower extremity: Palpable graft and doppler AT/DP in the LLE.   - PT recommending [facility] vs home with 24/7 assist  - plan for dialysis on Monday 4/25

## 2022-04-24 NOTE — PROGRESS NOTE ADULT - SUBJECTIVE AND OBJECTIVE BOX
[facility] DIVISION OF KIDNEY DISEASES AND HYPERTENSION -- HEMODIALYSIS NOTE  --------------------------------------------------------------------------------  Chief Complaint: ESRD/Ongoing hemodialysis requirement    24 hour events/subjective:  no acute event noted  pt seen and examined  feels well    PAST HISTORY  --------------------------------------------------------------------------------  No significant changes to PMH, PSH, FHx, SHx, unless otherwise noted    ALLERGIES & MEDICATIONS  --------------------------------------------------------------------------------  Allergies    Plavix (Hives)  Toprol-XL (Rash)          Standing Inpatient Medications  aspirin  chewable 81 milliGRAM(s) Oral daily  atorvastatin 80 milliGRAM(s) Oral at bedtime  cadexomer iodine 0.9% Gel 1 Application(s) Topical two times a day  ceFAZolin   IVPB 1000 milliGRAM(s) IV Intermittent every 24 hours  chlorhexidine 2% Cloths 1 Application(s) Topical <User Schedule>  DULoxetine 60 milliGRAM(s) Oral daily  enoxaparin Injectable 68 milliGRAM(s) SubCutaneous every 24 hours  epoetin juan-epbx (RETACRIT) Injectable 90545 Unit(s) IV Push <User Schedule>  isosorbide   mononitrate ER Tablet (IMDUR) 30 milliGRAM(s) Oral daily  melatonin 5 milliGRAM(s) Oral at bedtime  melatonin 3 milliGRAM(s) Oral at bedtime  multivitamin 1 Tablet(s) Oral daily  NIFEdipine XL 90 milliGRAM(s) Oral daily  pantoprazole    Tablet 40 milliGRAM(s) Oral before breakfast  sevelamer carbonate 800 milliGRAM(s) Oral three times a day  tamsulosin 0.4 milliGRAM(s) Oral at bedtime  torsemide 20 milliGRAM(s) Oral daily    PRN Inpatient Medications  acetaminophen     Tablet .. 975 milliGRAM(s) Oral every 6 hours PRN  aluminum hydroxide/magnesium hydroxide/simethicone Suspension 30 milliLiter(s) Oral every 8 hours PRN  HYDROmorphone   Tablet 2 milliGRAM(s) Oral every 4 hours PRN  HYDROmorphone  Injectable 0.5 milliGRAM(s) IV Push every 4 hours PRN  ondansetron Injectable 4 milliGRAM(s) IV Push every 6 hours PRN      REVIEW OF SYSTEMS  --------------------------------------------------------------------------------  Gen: No weight changes, fatigue, fevers/chills, weakness  Skin: No rashes  Head/Eyes/Ears/Mouth: No headache  Respiratory: No dyspnea, cough,  CV: No chest pain, orthopnea  GI: No abdominal pain, diarrhea, constipation, nausea, vomiting,  MSK: No joint pain  Neuro: No dizziness/lightheadedness, weakness  Heme: No bleeding  Psych: No significant nervousness, anxiety, stress, depression    All other systems were reviewed and are negative, except as noted.    VITALS/PHYSICAL EXAM  --------------------------------------------------------------------------------  T(C): 36.6 (04-24-22 @ 11:02), Max: 36.7 (04-23-22 @ 15:30)  HR: 66 (04-24-22 @ 11:02) (66 - 76)  BP: 110/48 (04-24-22 @ 11:02) (110/48 - 155/54)  RR: 18 (04-24-22 @ 11:02) (18 - 18)  SpO2: 100% (04-24-22 @ 11:02) (88% - 100%)  Wt(kg): --        04-23-22 @ 07:01  -  04-24-22 @ 07:00  --------------------------------------------------------  IN: 900 mL / OUT: 250 mL / NET: 650 mL      Physical Exam:  	Gen: NAD  	HEENT: PERRL, supple neck,  	Pulm: CTA B/L  	CV: RRR, S1S2; no rub  	Abd: +BS, soft, nontender  	UE: Warm  	LE: Warm LLE bandaged  	Neuro: No focal deficits  	Psych: Normal affect and mood  	Skin: Warm  	Vascular access: AVF    LABS/STUDIES  --------------------------------------------------------------------------------              9.0    7.35  >-----------<  293      [04-24-22 @ 09:32]              29.4     137  |  96  |  52.8  ----------------------------<  92      [04-24-22 @ 09:32]  4.6   |  24.0  |  4.53        Ca     8.9     [04-24-22 @ 09:32]      Mg     2.0     [04-24-22 @ 09:32]      Phos  3.7     [04-24-22 @ 09:32]        PTT: 54.7       [04-22-22 @ 17:06]      Iron 43, TIBC 249, %sat 17      [07-13-21 @ 16:07]  Ferritin 498      [07-13-21 @ 16:07]  HbA1c 4.9      [08-09-18 @ 05:54]  Lipid: chol 126, , HDL 60, LDL --      [06-03-21 @ 06:07]    HBsAb <3.0      [04-21-22 @ 22:28]  HBsAg Nonreact      [04-21-22 @ 22:28]  HCV 0.17, Nonreact      [04-21-22 @ 22:28]

## 2022-04-25 NOTE — PROGRESS NOTE ADULT - SUBJECTIVE AND OBJECTIVE BOX
INTERVAL HPI/OVERNIGHT EVENTS:    Patient evaluated at bedside. No acute distress. No acute events overnight. Patient reports feeling well. Remains hemodynamically stable and afebrile. Denies fevers, chills, nausea, emesis.  Denies chest pain, dyspnea.  Denies constipation, diarrhea. Denies headaches, dizziness, changes in vision. Daughter requests podiatry evaluation.     MEDICATIONS  (STANDING):  aspirin  chewable 81 milliGRAM(s) Oral daily  atorvastatin 80 milliGRAM(s) Oral at bedtime  cadexomer iodine 0.9% Gel 1 Application(s) Topical two times a day  ceFAZolin   IVPB 1000 milliGRAM(s) IV Intermittent every 24 hours  chlorhexidine 2% Cloths 1 Application(s) Topical <User Schedule>  DULoxetine 60 milliGRAM(s) Oral daily  enoxaparin Injectable 68 milliGRAM(s) SubCutaneous every 24 hours  epoetin juan-epbx (RETACRIT) Injectable 25127 Unit(s) IV Push <User Schedule>  isosorbide   mononitrate ER Tablet (IMDUR) 30 milliGRAM(s) Oral daily  melatonin 5 milliGRAM(s) Oral at bedtime  melatonin 3 milliGRAM(s) Oral at bedtime  multivitamin 1 Tablet(s) Oral daily  NIFEdipine XL 90 milliGRAM(s) Oral daily  pantoprazole    Tablet 40 milliGRAM(s) Oral before breakfast  sevelamer carbonate 800 milliGRAM(s) Oral three times a day  tamsulosin 0.4 milliGRAM(s) Oral at bedtime  torsemide 20 milliGRAM(s) Oral daily    MEDICATIONS  (PRN):  acetaminophen     Tablet .. 975 milliGRAM(s) Oral every 6 hours PRN Mild Pain (1 - 3)  aluminum hydroxide/magnesium hydroxide/simethicone Suspension 30 milliLiter(s) Oral every 8 hours PRN Dyspepsia  HYDROmorphone   Tablet 2 milliGRAM(s) Oral every 4 hours PRN Moderate Pain (4 - 6)  HYDROmorphone  Injectable 0.5 milliGRAM(s) IV Push every 4 hours PRN Severe Pain (7 - 10)  ondansetron Injectable 4 milliGRAM(s) IV Push every 6 hours PRN Nausea      Vital Signs Last 24 Hrs  T(C): 36.7 (25 Apr 2022 05:22), Max: 36.7 (24 Apr 2022 21:30)  T(F): 98 (25 Apr 2022 05:22), Max: 98.1 (24 Apr 2022 21:30)  HR: 72 (25 Apr 2022 05:22) (66 - 76)  BP: 135/62 (25 Apr 2022 05:22) (110/48 - 154/56)  BP(mean): --  RR: 16 (25 Apr 2022 05:22) (16 - 18)  SpO2: 99% (25 Apr 2022 05:22) (94% - 100%)    Constitutional: patient resting comfortably in bed, in no acute distress  HEENT: EOMI, PERRLA, MMM.  Respiratory: Non labored breathing on RA  Cardiovascular: RRR  Gastrointestinal: Abdomen soft, non-tender, non-distended, no rebound tenderness / guarding  Musculoskeletal: No joint pain, swelling or deformity; no limitation of movement  Vascular: no edema, bypass palpable distally, strong LLE AT      I&O's Detail    24 Apr 2022 07:01  -  25 Apr 2022 07:00  --------------------------------------------------------  IN:  Total IN: 0 mL    OUT:    Voided (mL): 100 mL  Total OUT: 100 mL    Total NET: -100 mL          LABS:                        8.6    8.50  )-----------( 302      ( 25 Apr 2022 06:26 )             27.3     04-25    139  |  96<L>  |  64.5<H>  ----------------------------<  102<H>  4.7   |  23.0  |  5.39<H>    Ca    9.1      25 Apr 2022 06:26  Phos  3.6     04-25  Mg     2.0     04-25            RADIOLOGY & ADDITIONAL STUDIES:

## 2022-04-25 NOTE — CONSULT NOTE ADULT - SUBJECTIVE AND OBJECTIVE BOX
HPI: 87M known to our service, PMhx of  ESRD on HD (M/F), anemia, CHFpEF, arrythmia, HTN, HLD, CAD, a-fib and LLE DVT (used to be on Flecainide and no longer on AC due to GI bleed), AS s/p TAVR, and PAD (RLE fem-pop bypass, revised LLE fem-pop bypass, March 2021, CFA to AT with cryovein ) presents with red, swollen painful left leg worsening for 1 week.  Patient was seen in the office yesterday by Dr. Mason.  A bypass duplex was performed and it was noted that the left bypass graft was thrombosed.  The plan at that time was to schedule an angiogram and recall patient, but the patient pain has been difficult to control at home.  No reports of any recent Gi blood loss, no chest pain, no sob, fever or chills.       Podiatry HPI: Full history as above. Podiatry consulted regarding left foot gangrenous digits. Patient with multiple recent thrombectomies, angio during hospital admission. Complains of pain with movement, palpation of his left foot digits. Denies any current fever, chills, nausea, vomiting.    Medications acetaminophen     Tablet .. 975 milliGRAM(s) Oral every 6 hours PRN  aluminum hydroxide/magnesium hydroxide/simethicone Suspension 30 milliLiter(s) Oral every 8 hours PRN  aspirin  chewable 81 milliGRAM(s) Oral daily  atorvastatin 80 milliGRAM(s) Oral at bedtime  cadexomer iodine 0.9% Gel 1 Application(s) Topical two times a day  ceFAZolin   IVPB 1000 milliGRAM(s) IV Intermittent every 24 hours  chlorhexidine 2% Cloths 1 Application(s) Topical <User Schedule>  DULoxetine 60 milliGRAM(s) Oral daily  enoxaparin Injectable 68 milliGRAM(s) SubCutaneous every 24 hours  epoetin juan-epbx (RETACRIT) Injectable 42244 Unit(s) IV Push <User Schedule>  HYDROmorphone   Tablet 2 milliGRAM(s) Oral every 4 hours PRN  HYDROmorphone  Injectable 0.5 milliGRAM(s) IV Push every 4 hours PRN  isosorbide   mononitrate ER Tablet (IMDUR) 30 milliGRAM(s) Oral daily  melatonin 5 milliGRAM(s) Oral at bedtime  melatonin 3 milliGRAM(s) Oral at bedtime  multivitamin 1 Tablet(s) Oral daily  NIFEdipine XL 90 milliGRAM(s) Oral daily  ondansetron Injectable 4 milliGRAM(s) IV Push every 6 hours PRN  pantoprazole    Tablet 40 milliGRAM(s) Oral before breakfast  sevelamer carbonate 800 milliGRAM(s) Oral three times a day  tamsulosin 0.4 milliGRAM(s) Oral at bedtime  torsemide 20 milliGRAM(s) Oral daily    FHNo pertinent family history in first degree relatives    Family history of cancer (Grandparent)    Family history of lung cancer (Grandparent)    Family history of premature CAD    FH: type 2 diabetes    ,   PMHDM (diabetes mellitus)    AV block, 1st degree    Arrhythmia    CKD (chronic kidney disease)    CAD (coronary artery disease)    HTN (hypertension)    VT (ventricular tachycardia)    RICHARDS (dyspnea on exertion)    Anemia    Risk factors for obstructive sleep apnea    ESRD on dialysis    Aortic stenosis    GI bleeding       PSHH/O circumcision    H/O left knee surgery    H/O angioplasty    A-V fistula    H/O carotid endarterectomy    S/P TAVR (transcatheter aortic valve replacement)    History of loop recorder        Labs                          8.6    8.50  )-----------( 302      ( 25 Apr 2022 06:26 )             27.3      04-25    139  |  96<L>  |  64.5<H>  ----------------------------<  102<H>  4.7   |  23.0  |  5.39<H>    Ca    9.1      25 Apr 2022 06:26  Phos  3.6     04-25  Mg     2.0     04-25       Vital Signs Last 24 Hrs  T(C): 37 (25 Apr 2022 08:00), Max: 37 (25 Apr 2022 08:00)  T(F): 98.6 (25 Apr 2022 08:00), Max: 98.6 (25 Apr 2022 08:00)  HR: 75 (25 Apr 2022 12:11) (67 - 76)  BP: 142/58 (25 Apr 2022 12:11) (130/57 - 154/56)  BP(mean): --  RR: 17 (25 Apr 2022 08:00) (16 - 18)  SpO2: 99% (25 Apr 2022 08:00) (94% - 99%)          WBC Count: 8.50 K/uL (04-25-22 @ 06:26)      ROS: All others negative unless otherwise stated in the HPI    Physical exam:  Vascular: DP, PT non palpable bilaterally  Derm: Left foot noted with dry gangrene to tips of digits, 3, 4, 5, dusky appearance to 1st and 2nd digit tips. No discrete openings noted. Warm to cool on left foot. No drainage noted.  Neuro: Protective sensation grossly diminished  MSK: Patient able to move all extremities    Assessment:    Plan:  Patient evaluated, chart reviewed  Discussed with patient etiology of their condition  Dressed left foot with iodosorb, DSD  Recommend continued local wound care  WCO: Iodosorb overlying distal tips of all digits, along with all dusky areas of toes  Podiatry to follow to observe demarcation of digits.   Podiatry will follow while in house  Discussed and seen with attending Dr. Riddle

## 2022-04-25 NOTE — PROGRESS NOTE ADULT - SUBJECTIVE AND OBJECTIVE BOX
Vital Signs Last 24 Hrs  T(C): 36.7 (25 Apr 2022 05:22), Max: 36.7 (24 Apr 2022 21:30)  T(F): 98 (25 Apr 2022 05:22), Max: 98.1 (24 Apr 2022 21:30)  HR: 72 (25 Apr 2022 05:22) (66 - 76)  BP: 135/62 (25 Apr 2022 05:22) (110/48 - 154/56)  RR: 16 (25 Apr 2022 05:22) (16 - 18)  SpO2: 99% (25 Apr 2022 05:22) (94% - 100%)  HD today. Hemodynamics stable. Tolerating dialysis and ultrafiltration.  Pre Laboratory values personally reviewed by me.  Dialysis adjusted appropriately based on current values.  Will continue the current medical management.  Next hemodialysis as scheduled.  Discussed with nursing, primary care team.

## 2022-04-25 NOTE — PROGRESS NOTE ADULT - ASSESSMENT
88 yo M with rest pain, Ischemic left lower extremity, chronic. Secondary to failed distal bypass graft. S/P LLE diagnostic angio on 4/11 with occluded SFA and pop, s/p L fem to AT bypass with cryovein on 4/13 c/b acute graft rethrombosis, s/p thrombectomy on 4/14. Post-op complicated by rethrombosis of graft requiring angio with thrombectomy and balloon angioplasty on 4/18. Recovering well     Plan:   - Cont with knee immobilizer  - AC with therpaeutic loveox, 1mg/kg qd due to CrCl  - PT, avoid pressure over the graft (Lateral left leg)  - pain control  - Neurovascular checks Q4 of the left lower extremity: Palpable graft and doppler AT/DP in the LLE.   - PT recommending [facility] vs home with 24/7 assist  - plan for dialysis on Monday 4/25  - Will contact podiatry for evaluation  - Possible discharge today home with family

## 2022-04-26 NOTE — PROGRESS NOTE ADULT - ASSESSMENT
86 yo M with rest pain, Ischemic left lower extremity, chronic. Secondary to failed distal bypass graft. S/P LLE diagnostic angio on 4/11 with occluded SFA and pop, s/p L fem to AT bypass with cryovein on 4/13 c/b acute graft rethrombosis, s/p thrombectomy on 4/14. Post-op complicated by rethrombosis of graft requiring angio with thrombectomy and balloon angioplasty on 4/18. Recovering well     Plan:   - Cont with knee immobilizer  - AC with therpaeutic loveox, 1mg/kg qd due to CrCl  - PT, avoid pressure over the graft (Lateral left leg)  - pain control  - Neurovascular checks Q4 of the left lower extremity: Palpable graft and doppler AT/DP in the LLE.   - PT recommending [facility] vs home with 24/7 assist  - Podiatry evaluation appreciated  - Possible discharge today home with family

## 2022-04-26 NOTE — PROGRESS NOTE ADULT - NUTRITIONAL ASSESSMENT
This patient has been assessed with a concern for Malnutrition and has been determined to have a diagnosis/diagnoses of Severe protein-calorie malnutrition.    This patient is being managed with:   Diet Renal Restrictions-  For patients receiving Renal Replacement - No Protein Restr No Conc K No Conc Phos Low Sodium  Entered: Apr 14 2022  5:04PM    
This patient has been assessed with a concern for Malnutrition and has been determined to have a diagnosis/diagnoses of Severe protein-calorie malnutrition.    This patient is being managed with:   Diet Renal Restrictions-  For patients receiving Renal Replacement - No Protein Restr No Conc K No Conc Phos Low Sodium  Entered: Apr 14 2022  5:04PM    
This patient has been assessed with a concern for Malnutrition and has been determined to have a diagnosis/diagnoses of Severe protein-calorie malnutrition.    This patient is being managed with:   Diet Renal Restrictions-  For patients receiving Renal Replacement - No Protein Restr No Conc K No Conc Phos Low Sodium  Supplement Feeding Modality:  Oral  Nepro Cans or Servings Per Day:  3       Frequency:  Three Times a day  Entered: Apr 21 2022  8:27AM

## 2022-04-26 NOTE — PROGRESS NOTE ADULT - SUBJECTIVE AND OBJECTIVE BOX
INTERVAL HPI/ OVERNIGHT EVENTS:    Patient evaluated at bedside. No acute distress. No acute events overnight. Remains hemodynamically stable and afebrile. Evaluated by podiatry recommending local wound care.     Minmal Pain - Relieved w. Tylenol,     MEDICATIONS  (STANDING):    aspirin  chewable 81 milliGRAM(s) Oral daily  atorvastatin 80 milliGRAM(s) Oral at bedtime  cadexomer iodine 0.9% Gel 1 Application(s) Topical two times a day  ceFAZolin   IVPB 1000 milliGRAM(s) IV Intermittent every 24 hours  chlorhexidine 2% Cloths 1 Application(s) Topical <User Schedule>  DULoxetine 60 milliGRAM(s) Oral daily  enoxaparin Injectable 68 milliGRAM(s) SubCutaneous every 24 hours  epoetin juan-epbx (RETACRIT) Injectable 88868 Unit(s) IV Push <User Schedule>  isosorbide   mononitrate ER Tablet (IMDUR) 30 milliGRAM(s) Oral daily  melatonin 3 milliGRAM(s) Oral at bedtime  melatonin 5 milliGRAM(s) Oral at bedtime  multivitamin 1 Tablet(s) Oral daily  NIFEdipine XL 90 milliGRAM(s) Oral daily  pantoprazole    Tablet 40 milliGRAM(s) Oral before breakfast  sevelamer carbonate 800 milliGRAM(s) Oral three times a day  tamsulosin 0.4 milliGRAM(s) Oral at bedtime  torsemide 20 milliGRAM(s) Oral daily    MEDICATIONS  (PRN):  acetaminophen     Tablet .. 975 milliGRAM(s) Oral every 6 hours PRN Mild Pain (1 - 3)  aluminum hydroxide/magnesium hydroxide/simethicone Suspension 30 milliLiter(s) Oral every 8 hours PRN Dyspepsia  HYDROmorphone   Tablet 2 milliGRAM(s) Oral every 4 hours PRN Moderate Pain (4 - 6)  HYDROmorphone  Injectable 0.5 milliGRAM(s) IV Push every 4 hours PRN Severe Pain (7 - 10)  ondansetron Injectable 4 milliGRAM(s) IV Push every 6 hours PRN Nausea    Vital Signs Last 24 Hrs  T(C): 36.9 (26 Apr 2022 06:45), Max: 37.1 (25 Apr 2022 17:06)  T(F): 98.5 (26 Apr 2022 06:45), Max: 98.7 (25 Apr 2022 17:06)  HR: 76 (26 Apr 2022 06:45) (63 - 76)  BP: 123/59 (26 Apr 2022 06:45) (118/49 - 147/83)  RR: 16 (26 Apr 2022 06:45) (16 - 18)  SpO2: 96% (26 Apr 2022 06:45) (92% - 100%)    Constitutional: patient resting comfortably in bed, in no acute distress, Pale, Sallow,   HEENT: EOMI, PERRLA, MMM.  Respiratory: Non labored breathing on RA  Cardiovascular: RSR  Gastrointestinal: Abdomen soft, non-tender, non-distended, no rebound tenderness / No  guarding  Musculoskeletal: No joint pain, swelling or deformity; no limitation of movement  Vascular: no edema, bypass palpable distally,     Strong LLE AT Arterial Pulse,  AVF +    I&O's Detail    25 Apr 2022 07:01  -  26 Apr 2022 07:00  --------------------------------------------------------  IN:    Oral Fluid: 350 mL    Other (mL): 900 mL  Total IN: 1250 mL    OUT:    Other (mL): 1900 mL    Voided (mL): 200 mL  Total OUT: 2100 mL    Total NET: -850 mL    LABS:                        8.6    8.50  )-----------( 302      ( 25 Apr 2022 06:26 )             27.3     04-25    139  |  96<L>  |  64.5<H>  ----------------------------<  102<H>  4.7   |  23.0  |  5.39<H>    Ca    9.1      25 Apr 2022 06:26  Phos  3.6     04-25  Mg     2.0     04-25  Hemoglobin: 8.6 g/dL (04.25.22 @ 06:26)   Historical Values  Hemoglobin: 8.6 g/dL (04.25.22 @ 06:26)   Hemoglobin: 9.0 g/dL (04.24.22 @ 09:32)   Hemoglobin: 8.4 g/dL (04.23.22 @ 05:59)   Hemoglobin: 9.0 g/dL (04.22.22 @ 05:29)   Hemoglobin: 7.3 g/dL (04.21.22 @ 07:17)   Hemoglobin: 8.7 g/dL (04.20.22 @ 05:28)     · Operative Findings	Reopened the distal bypass incision. Cut down over superior thigh. Clot encountered overlaying bypass. Vein incised and clot removed. Moreno catheter passed through and clot removed. Good forward and back flow.    -Post procedure management/monitoring per protocol  -Access site precautions  -On  heparin gtt at previous rate with NO bolus and follow heparin protocol  -Continue current medical therapy               86 yo M with rest pain, Ischemic left lower extremity, chronic. Secondary to failed distal bypass graft. S/P LLE diagnostic angio on 4/11 with occluded SFA and pop, s/p L fem to AT bypass with cryovein on 4/13 c/b acute graft rethrombosis, s/p thrombectomy on 4/14. Post-op complicated by rethrombosis of graft requiring angio with thrombectomy and balloon angioplasty on 4/18.     Recovering well     Plan:   - + Knee immobilizer  - AC with Therapeutic lovenox, 1mg/kg qd ( ESRD Dosing )  - PT, avoid pressure over the graft (Lateral left leg)  - pain control  - Neurovascular checks Q4 of the left lower extremity: Palpable graft and doppler AT/ DP in the LLE.   - Home with 24/7 assist    - Possible discharge today home with family,    OP HD On Friday,

## 2022-04-26 NOTE — PROGRESS NOTE ADULT - SUBJECTIVE AND OBJECTIVE BOX
INTERVAL HPI/OVERNIGHT EVENTS:    Patient evaluated at bedside. No acute distress. No acute events overnight. Remains hemodynamically stable and afebrile. Evaluated by podiatry recommending local wound care.     MEDICATIONS  (STANDING):  aspirin  chewable 81 milliGRAM(s) Oral daily  atorvastatin 80 milliGRAM(s) Oral at bedtime  cadexomer iodine 0.9% Gel 1 Application(s) Topical two times a day  ceFAZolin   IVPB 1000 milliGRAM(s) IV Intermittent every 24 hours  chlorhexidine 2% Cloths 1 Application(s) Topical <User Schedule>  DULoxetine 60 milliGRAM(s) Oral daily  enoxaparin Injectable 68 milliGRAM(s) SubCutaneous every 24 hours  epoetin juan-epbx (RETACRIT) Injectable 66240 Unit(s) IV Push <User Schedule>  isosorbide   mononitrate ER Tablet (IMDUR) 30 milliGRAM(s) Oral daily  melatonin 3 milliGRAM(s) Oral at bedtime  melatonin 5 milliGRAM(s) Oral at bedtime  multivitamin 1 Tablet(s) Oral daily  NIFEdipine XL 90 milliGRAM(s) Oral daily  pantoprazole    Tablet 40 milliGRAM(s) Oral before breakfast  sevelamer carbonate 800 milliGRAM(s) Oral three times a day  tamsulosin 0.4 milliGRAM(s) Oral at bedtime  torsemide 20 milliGRAM(s) Oral daily    MEDICATIONS  (PRN):  acetaminophen     Tablet .. 975 milliGRAM(s) Oral every 6 hours PRN Mild Pain (1 - 3)  aluminum hydroxide/magnesium hydroxide/simethicone Suspension 30 milliLiter(s) Oral every 8 hours PRN Dyspepsia  HYDROmorphone   Tablet 2 milliGRAM(s) Oral every 4 hours PRN Moderate Pain (4 - 6)  HYDROmorphone  Injectable 0.5 milliGRAM(s) IV Push every 4 hours PRN Severe Pain (7 - 10)  ondansetron Injectable 4 milliGRAM(s) IV Push every 6 hours PRN Nausea      Vital Signs Last 24 Hrs  T(C): 36.9 (26 Apr 2022 06:45), Max: 37.1 (25 Apr 2022 17:06)  T(F): 98.5 (26 Apr 2022 06:45), Max: 98.7 (25 Apr 2022 17:06)  HR: 76 (26 Apr 2022 06:45) (63 - 76)  BP: 123/59 (26 Apr 2022 06:45) (118/49 - 147/83)  BP(mean): --  RR: 16 (26 Apr 2022 06:45) (16 - 18)  SpO2: 96% (26 Apr 2022 06:45) (92% - 100%)    Constitutional: patient resting comfortably in bed, in no acute distress  HEENT: EOMI, PERRLA, MMM.  Respiratory: Non labored breathing on RA  Cardiovascular: RRR  Gastrointestinal: Abdomen soft, non-tender, non-distended, no rebound tenderness / guarding  Musculoskeletal: No joint pain, swelling or deformity; no limitation of movement  Vascular: no edema, bypass palpable distally, strong LLE AT      I&O's Detail    25 Apr 2022 07:01  -  26 Apr 2022 07:00  --------------------------------------------------------  IN:    Oral Fluid: 350 mL    Other (mL): 900 mL  Total IN: 1250 mL    OUT:    Other (mL): 1900 mL    Voided (mL): 200 mL  Total OUT: 2100 mL    Total NET: -850 mL          LABS:                        8.6    8.50  )-----------( 302      ( 25 Apr 2022 06:26 )             27.3     04-25    139  |  96<L>  |  64.5<H>  ----------------------------<  102<H>  4.7   |  23.0  |  5.39<H>    Ca    9.1      25 Apr 2022 06:26  Phos  3.6     04-25  Mg     2.0     04-25            RADIOLOGY & ADDITIONAL STUDIES:

## 2022-04-26 NOTE — PROGRESS NOTE ADULT - ASSESSMENT
Doppler Signals Strong,     Next HD On Friday, On EPO TIW ( 2 IV w. HD , 1 SC )    Confirmed Both above, angeli [facility], RNs,    D/W Daughter.

## 2022-04-26 NOTE — PROGRESS NOTE ADULT - SUBJECTIVE AND OBJECTIVE BOX
HPI: 87M known to our service, PMhx of  ESRD on HD (M/F), anemia, CHFpEF, arrythmia, HTN, HLD, CAD, a-fib and LLE DVT (used to be on Flecainide and no longer on AC due to GI bleed), AS s/p TAVR, and PAD (RLE fem-pop bypass, revised LLE fem-pop bypass, March 2021, CFA to AT with cryovein ) presents with red, swollen painful left leg worsening for 1 week.  Patient was seen in the office yesterday by Dr. Mason.  A bypass duplex was performed and it was noted that the left bypass graft was thrombosed.  The plan at that time was to schedule an angiogram and recall patient, but the patient pain has been difficult to control at home.  No reports of any recent Gi blood loss, no chest pain, no sob, fever or chills.       Podiatry HPI: Full history as above. Podiatry consulted regarding left foot gangrenous digits. Patient with multiple recent thrombectomies, angio during hospital admission. Complains of pain with movement, palpation of his left foot digits. Denies any current fever, chills, nausea, vomiting.    Podiatry interval HPI: Patient laying comfortably in bed. Denies any acute overnight events. Continues to complain of mild pain to his left foot primarily. Denies any current fever, chills, nausea, vomiting    Medications acetaminophen     Tablet .. 975 milliGRAM(s) Oral every 6 hours PRN  aluminum hydroxide/magnesium hydroxide/simethicone Suspension 30 milliLiter(s) Oral every 8 hours PRN  aspirin  chewable 81 milliGRAM(s) Oral daily  atorvastatin 80 milliGRAM(s) Oral at bedtime  cadexomer iodine 0.9% Gel 1 Application(s) Topical two times a day  ceFAZolin   IVPB 1000 milliGRAM(s) IV Intermittent every 24 hours  chlorhexidine 2% Cloths 1 Application(s) Topical <User Schedule>  DULoxetine 60 milliGRAM(s) Oral daily  enoxaparin Injectable 68 milliGRAM(s) SubCutaneous every 24 hours  epoetin juan-epbx (RETACRIT) Injectable 14382 Unit(s) IV Push <User Schedule>  HYDROmorphone   Tablet 2 milliGRAM(s) Oral every 4 hours PRN  HYDROmorphone  Injectable 0.5 milliGRAM(s) IV Push every 4 hours PRN  isosorbide   mononitrate ER Tablet (IMDUR) 30 milliGRAM(s) Oral daily  melatonin 3 milliGRAM(s) Oral at bedtime  melatonin 5 milliGRAM(s) Oral at bedtime  multivitamin 1 Tablet(s) Oral daily  NIFEdipine XL 90 milliGRAM(s) Oral daily  ondansetron Injectable 4 milliGRAM(s) IV Push every 6 hours PRN  pantoprazole    Tablet 40 milliGRAM(s) Oral before breakfast  sevelamer carbonate 800 milliGRAM(s) Oral three times a day  tamsulosin 0.4 milliGRAM(s) Oral at bedtime  torsemide 20 milliGRAM(s) Oral daily    FHNo pertinent family history in first degree relatives    Family history of cancer (Grandparent)    Family history of lung cancer (Grandparent)    Family history of premature CAD    FH: type 2 diabetes    ,   PMHDM (diabetes mellitus)    AV block, 1st degree    Arrhythmia    CKD (chronic kidney disease)    CAD (coronary artery disease)    HTN (hypertension)    VT (ventricular tachycardia)    RICHARDS (dyspnea on exertion)    Anemia    Risk factors for obstructive sleep apnea    ESRD on dialysis    Aortic stenosis    GI bleeding       PSHH/O circumcision    H/O left knee surgery    H/O angioplasty    A-V fistula    H/O carotid endarterectomy    S/P TAVR (transcatheter aortic valve replacement)    History of loop recorder        Labs                          8.6    8.50  )-----------( 302      ( 25 Apr 2022 06:26 )             27.3      04-25    139  |  96<L>  |  64.5<H>  ----------------------------<  102<H>  4.7   |  23.0  |  5.39<H>    Ca    9.1      25 Apr 2022 06:26  Phos  3.6     04-25  Mg     2.0     04-25       Vital Signs Last 24 Hrs  T(C): 36.9 (26 Apr 2022 06:45), Max: 37.1 (25 Apr 2022 17:06)  T(F): 98.5 (26 Apr 2022 06:45), Max: 98.7 (25 Apr 2022 17:06)  HR: 76 (26 Apr 2022 06:45) (63 - 76)  BP: 123/59 (26 Apr 2022 06:45) (118/49 - 142/52)  BP(mean): --  RR: 16 (26 Apr 2022 06:45) (16 - 18)  SpO2: 96% (26 Apr 2022 06:45) (92% - 99%)              ROS: All others negative unless otherwise stated in the HPI    Physical exam:  Vascular: DP, PT non palpable bilaterally  Derm: Left foot noted with dry gangrene to tips of digits, 3, 4, 5, dusky appearance to 1st and 2nd digit tips, now with further dry gangrenous changes. No discrete openings noted. Warm to cool on left foot. No drainage noted.  Neuro: Protective sensation grossly diminished  MSK: Patient able to move all extremities    Assessment:    Plan:  Patient evaluated, chart reviewed  Dressed left foot with iodosorb, DSD  Recommend continued local wound care  WCO: Iodosorb overlying distal tips of all digits, along with all dusky areas of toes  Podiatry to follow to observe demarcation of digits.   Podiatry will follow while in house  Discussed and seen with attending Dr. Guerrero

## 2022-04-27 NOTE — DISCHARGE NOTE NURSING/CASE MANAGEMENT/SOCIAL WORK - PATIENT PORTAL LINK FT
You can access the FollowMyHealth Patient Portal offered by [facility] by registering at the following website: [email or web address]. By joining [employer]’s FollowMyHealth portal, you will also be able to view your health information using other applications (apps) compatible with our system.

## 2022-04-27 NOTE — PROGRESS NOTE ADULT - REASON FOR ADMISSION
left le ischemia

## 2022-04-27 NOTE — PROGRESS NOTE ADULT - PROVIDER SPECIALTY LIST ADULT
Cardiology
Nephrology
Surgery
Vascular Surgery
Vascular Surgery
Cardiology
Nephrology
Podiatry
Vascular Surgery
Vascular Surgery
Anesthesia
Cardiology
Nephrology
Vascular Surgery
Nephrology
Vascular Surgery

## 2022-04-27 NOTE — DISCHARGE NOTE PROVIDER - NSDCCPTREATMENT_GEN_ALL_CORE_FT
PRINCIPAL PROCEDURE  Procedure: Creation of femoral-tibial artery bypass with vein graft  Findings and Treatment: LEFT Fem-AT bypass with cryovein      SECONDARY PROCEDURE  Procedure: Thrombectomy, bypass graft  Findings and Treatment:     Procedure: Cardiac pacemaker interrogation  Findings and Treatment:     Procedure: Angiogram, lower extremity, left  Findings and Treatment: thrombectomy, angioplasty

## 2022-04-27 NOTE — PROGRESS NOTE ADULT - SUBJECTIVE AND OBJECTIVE BOX
INTERVAL HPI/ OVERNIGHT EVENTS: No Acute Events,     Patient evaluated at bedside. No acute distress. No acute events overnight. Reports feeling well this morning. No new complaints.     In Chair, Frail,     MEDICATIONS  (STANDING):    aspirin  chewable 81 milliGRAM(s) Oral daily  atorvastatin 80 milliGRAM(s) Oral at bedtime  cadexomer iodine 0.9% Gel 1 Application(s) Topical two times a day  chlorhexidine 2% Cloths 1 Application(s) Topical <User Schedule>  DULoxetine 60 milliGRAM(s) Oral daily  enoxaparin Injectable 68 milliGRAM(s) SubCutaneous every 24 hours  epoetin juan-epbx (RETACRIT) Injectable 61114 Unit(s) IV Push <User Schedule>  epoetin juan-epbx (RETACRIT) Injectable 26921 Unit(s) SubCutaneous once  isosorbide   mononitrate ER Tablet (IMDUR) 30 milliGRAM(s) Oral daily  melatonin 5 milliGRAM(s) Oral at bedtime  melatonin 3 milliGRAM(s) Oral at bedtime  multivitamin 1 Tablet(s) Oral daily  NIFEdipine XL 90 milliGRAM(s) Oral daily  pantoprazole    Tablet 40 milliGRAM(s) Oral before breakfast  sevelamer carbonate 800 milliGRAM(s) Oral three times a day  tamsulosin 0.4 milliGRAM(s) Oral at bedtime  torsemide 20 milliGRAM(s) Oral daily    MEDICATIONS  (PRN):  acetaminophen     Tablet .. 975 milliGRAM(s) Oral every 6 hours PRN Mild Pain (1 - 3)  aluminum hydroxide/magnesium hydroxide/simethicone Suspension 30 milliLiter(s) Oral every 8 hours PRN Dyspepsia  ondansetron Injectable 4 milliGRAM(s) IV Push every 6 hours PRN Nausea    Vital Signs Last 24 Hrs  T(C): 36.5 (27 Apr 2022 05:54), Max: 36.9 (26 Apr 2022 21:49)  T(F): 97.7 (27 Apr 2022 05:54), Max: 98.4 (26 Apr 2022 21:49)  HR: 80 (27 Apr 2022 05:54) (80 - 86)  BP: 159/74 (27 Apr 2022 05:54) (139/52 - 159/74)  RR: 16 (27 Apr 2022 05:54) (16 - 17)  SpO2: 97% (27 Apr 2022 05:54) (96% - 97%)    Constitutional: patient resting comfortably in chair , in no acute distress  HEENT: EOMI, PERRLA, MMM.  Pale, Sallow Complexion,   Respiratory: Non labored breathing on RA  Cardiovascular: RRR  Gastrointestinal: Abdomen soft, non-tender, non-distended, no rebound tenderness / guarding  Musculoskeletal: No joint pain, swelling or deformity; no limitation of movement  Vascular: no edema, bypass palpable distally, strong LLE AT.   Groin without hematoma, dressings clean.     Toes Gangrenous, AVF +    I&O's Detail    25 Apr 2022 07:01  -  26 Apr 2022 07:00  --------------------------------------------------------  IN:    Oral Fluid: 350 mL    Other (mL): 900 mL  Total IN: 1250 mL    OUT:    Other (mL): 1900 mL    Voided (mL): 200 mL  Total OUT: 2100 mL    Total NET: -850 mL      26 Apr 2022 07:01  -  27 Apr 2022 06:55  --------------------------------------------------------  IN:    Oral Fluid: 840 mL  Total IN: 840 mL    OUT:    Voided (mL): 0 mL  Total OUT: 0 mL    Total NET: 840 mL    Hemoglobin: 8.6 g/dL (04.25.22 @ 06:26)       Historical Values  Hemoglobin: 8.6 g/dL (04.25.22 @ 06:26)   Hemoglobin: 9.0 g/dL (04.24.22 @ 09:32)     RADIOLOGY & ADDITIONAL STUDIES:

## 2022-04-27 NOTE — DISCHARGE NOTE PROVIDER - CARE PROVIDER_API CALL
Siva Winston)  Surgery; Vascular Surgery  [address], [address] [place] [address]  Phone: [phone]  Fax: [phone]  Established Patient  Follow Up Time: 2 weeks

## 2022-04-27 NOTE — DISCHARGE NOTE PROVIDER - NSDCMRMEDTOKEN_GEN_ALL_CORE_FT
acetaminophen 325 mg oral tablet: 3 tab(s) orally every 6 hours, As needed, Mild Pain (1 - 3)  aspirin 81 mg oral tablet, chewable: 1 tab(s) orally once a day  atorvastatin 80 mg oral tablet: 1 tab(s) orally once a day (at bedtime)  cadexomer iodine 0.9% topical gel: 1 application topically 2 times a day  DULoxetine 60 mg oral delayed release capsule: 1 cap(s) orally once a day  enoxaparin 80 mg/0.8 mL injectable solution: 68 milligram(s) subcutaneously once a day   isosorbide mononitrate 30 mg oral tablet, extended release: 1 tab(s) orally once a day  Melatonin 1 mg oral tablet: 1 tab(s) orally once a day (at bedtime)  Nephro-Thomas oral tablet: 1 tab(s) orally once a day  NIFEdipine 90 mg oral tablet, extended release: 1 tab(s) orally once a day  pantoprazole 40 mg oral delayed release tablet: 1 tab(s) orally 2 times a day  sevelamer hydrochloride 800 mg oral tablet: 1 tab(s) orally 3 times a day  tamsulosin 0.4 mg oral capsule: 1 cap(s) orally once a day (at bedtime)  torsemide 20 mg oral tablet: 1 tab(s) orally on non HD days   acetaminophen 325 mg oral tablet: 3 tab(s) orally every 6 hours, As needed, Mild Pain (1 - 3)  aspirin 81 mg oral tablet, chewable: 1 tab(s) orally once a day  atorvastatin 80 mg oral tablet: 1 tab(s) orally once a day (at bedtime)  cadexomer iodine 0.9% topical gel: 1 application topically 2 times a day  Dilaudid 2 mg oral tablet: 1 tab(s) orally every 6 hours MDD:4  DULoxetine 60 mg oral delayed release capsule: 1 cap(s) orally once a day  enoxaparin 80 mg/0.8 mL injectable solution: 68 milligram(s) subcutaneously once a day   isosorbide mononitrate 30 mg oral tablet, extended release: 1 tab(s) orally once a day  Melatonin 1 mg oral tablet: 1 tab(s) orally once a day (at bedtime)  Nephro-Thomas oral tablet: 1 tab(s) orally once a day  NIFEdipine 90 mg oral tablet, extended release: 1 tab(s) orally once a day  pantoprazole 40 mg oral delayed release tablet: 1 tab(s) orally 2 times a day  sevelamer hydrochloride 800 mg oral tablet: 1 tab(s) orally 3 times a day  tamsulosin 0.4 mg oral capsule: 1 cap(s) orally once a day (at bedtime)  torsemide 20 mg oral tablet: 1 tab(s) orally on non HD days

## 2022-04-27 NOTE — PROGRESS NOTE ADULT - ASSESSMENT
88 yo M with rest pain, Ischemic left lower extremity, chronic. Secondary to failed distal bypass graft. S/P LLE diagnostic angio on 4/11 with occluded SFA and pop, s/p L fem to AT bypass with cryovein on 4/13 c/b acute graft rethrombosis, s/p thrombectomy on 4/14. Post-op complicated by rethrombosis of graft requiring angio with thrombectomy and balloon angioplasty on 4/18. Recovering well     Plan:   - Cont with knee immobilizer  - AC with therpaeutic loveox, 1mg/kg qd due to CrCl  - PT, avoid pressure over the graft (Lateral left leg)  - pain control  - Neurovascular checks Q4 of the left lower extremity: Palpable graft and doppler AT/DP in the LLE.   - PT recommending LYNNE vs home with 24/7 assist  - Podiatry evaluation appreciated  - discharge today home with family

## 2022-04-27 NOTE — DISCHARGE NOTE PROVIDER - NSDCCPCAREPLAN_GEN_ALL_CORE_FT
PRINCIPAL DISCHARGE DIAGNOSIS  Diagnosis: PVD (peripheral vascular disease)  Assessment and Plan of Treatment: Follow up: Please call and make an appointment with the Vascular Surgery Clinic 10-14 days after discharge. Also, please call and make an appointment with your primary care physician as per your usual schedule.   Activity: Please avoid pressure over the lateral side of the left leg (where bypass is)  Diet: May continue renal diet.  Medications: Please take all medications listed on your discharge paperwork as prescribed. Continue with daily lovenox injections.  Wound Care: Please, keep wound site clean and dry. You may shower, but do not bathe.  Patient is advised to RETURN TO THE EMERGENCY DEPARTMENT for any of the following - worsening pain, fever/chills, nausea/vomiting, altered mental status, chest pain, shortness of breath, or any other new / worsening symptom.

## 2022-04-27 NOTE — DISCHARGE NOTE PROVIDER - NSDCFUSCHEDAPPT_GEN_ALL_CORE_FT
[facility] Physician [facility]  Cardio Electro [address]  Scheduled Appointment: 05/09/2022

## 2022-04-27 NOTE — DISCHARGE NOTE PROVIDER - HOSPITAL COURSE
HPI:  87M known to our service, PMhx of  ESRD on HD (M/F), anemia, CHFpEF, arrythmia, HTN, HLD, CAD, a-fib and LLE DVT (used to be on Flecainide and no longer on AC due to GI bleed), AS s/p TAVR, and PAD (RLE fem-pop bypass, revised LLE fem-pop bypass, March 2021, CFA to AT with cryovein ) presents with red, swollen painful left leg worsening for 1 week.  Patient was seen in the office yesterday by Dr. Mason.  A bypass duplex was performed and it was noted that the left bypass graft was thrombosed.  The plan at that time was to schedule an angiogram and recall patient, but the patient pain has been difficult to control at home.  No reports of any recent Gi blood loss, no chest pain, no sob, fever or chills.   (06 Apr 2022 17:45)    Hospital course:  Patient was admitted to the vascular service on 4/6 and was started slowly on a heparin gtt due to history of GIB. Patient was optimized by medicine and cardiology and underwent a left lower extremity angiogram on 4/11 that demonstrated occlusion of the SFA and popliteal artery with distal peroneal and AT run off. Patient was then scheduled to undergo a left femoral to AT bypass with cryovein on 4/13. After bypass the patient had signals to the AT and palpable graft. It was noted on 4/14 that the signals were not found and the patient underwent a revision of the bypass in the OR with thrombectomy. Patient continued with good signals and palpable graft until 4/19 where he was noted to loose signals again and the patient underwent an angioplasty of the graft with thrombectomy on 4/20. After thrombectomy the patient progressed well with good palpable graft and good AT/DP signals. Podiatry was consulted at request of the daughter, recommending local wound care to the necrotic toes and wait for demarcation. During hospital stay patient was followed by nephrology and continued on his HD schedule Monday-Friday without complications through L AVF. Patient evaluated by PT recommending LYNNE vs home with 24/7 assist and family requested home. Case management and SW aware of the plan and helped facilitate the patient's discharge.   Heparin gtt was stopped and transitioned to Lovenox 1mg/kg/day, per nephrology recommendations on renal patients.   Patient is now stable to be discharged home with assistance, and Lovenox.

## 2022-04-27 NOTE — DISCHARGE NOTE NURSING/CASE MANAGEMENT/SOCIAL WORK - NSDCPEFALRISK_GEN_ALL_CORE
For information on Fall & Injury Prevention, visit: [email or web address] OR  [email or web address] OR  https://www.cdc.gov/steadi/patient.html

## 2022-04-27 NOTE — PROGRESS NOTE ADULT - ASSESSMENT
88 yo M with rest pain, Ischemic left lower extremity, chronic. Secondary to failed distal bypass graft. S/P LLE diagnostic angio on 4/11 with occluded SFA and pop, s/p L fem to AT bypass with cryovein on 4/13 c/b acute graft rethrombosis, s/p thrombectomy on 4/14. Post-op complicated by rethrombosis of graft requiring angio with thrombectomy and balloon angioplasty on 4/18.     Recovering well     Plan:   - + Knee immobilizer  - AC with Therapeutic lovenox, 1mg/kg qd ( ESRD Dosing )  - PT, avoid pressure over the graft (Lateral left leg)  - pain control  - Neurovascular checks Q4 of the left lower extremity: Palpable graft and doppler AT/ DP in the LLE.   - Home with 24/7 assist    - Possible discharge today home with family,    OP HD On Friday,     Cleared for discharge,     On Lovenox QD,     D/W Daughter,

## 2022-04-27 NOTE — PROGRESS NOTE ADULT - SUBJECTIVE AND OBJECTIVE BOX
INTERVAL HPI/OVERNIGHT EVENTS:    Patient evaluated at bedside. No acute distress. No acute events overnight. Reports feeling well this morning. No new complaints.     MEDICATIONS  (STANDING):  aspirin  chewable 81 milliGRAM(s) Oral daily  atorvastatin 80 milliGRAM(s) Oral at bedtime  cadexomer iodine 0.9% Gel 1 Application(s) Topical two times a day  chlorhexidine 2% Cloths 1 Application(s) Topical <User Schedule>  DULoxetine 60 milliGRAM(s) Oral daily  enoxaparin Injectable 68 milliGRAM(s) SubCutaneous every 24 hours  epoetin juan-epbx (RETACRIT) Injectable 04835 Unit(s) IV Push <User Schedule>  epoetin juan-epbx (RETACRIT) Injectable 16990 Unit(s) SubCutaneous once  isosorbide   mononitrate ER Tablet (IMDUR) 30 milliGRAM(s) Oral daily  melatonin 5 milliGRAM(s) Oral at bedtime  melatonin 3 milliGRAM(s) Oral at bedtime  multivitamin 1 Tablet(s) Oral daily  NIFEdipine XL 90 milliGRAM(s) Oral daily  pantoprazole    Tablet 40 milliGRAM(s) Oral before breakfast  sevelamer carbonate 800 milliGRAM(s) Oral three times a day  tamsulosin 0.4 milliGRAM(s) Oral at bedtime  torsemide 20 milliGRAM(s) Oral daily    MEDICATIONS  (PRN):  acetaminophen     Tablet .. 975 milliGRAM(s) Oral every 6 hours PRN Mild Pain (1 - 3)  aluminum hydroxide/magnesium hydroxide/simethicone Suspension 30 milliLiter(s) Oral every 8 hours PRN Dyspepsia  ondansetron Injectable 4 milliGRAM(s) IV Push every 6 hours PRN Nausea      Vital Signs Last 24 Hrs  T(C): 36.5 (27 Apr 2022 05:54), Max: 36.9 (26 Apr 2022 21:49)  T(F): 97.7 (27 Apr 2022 05:54), Max: 98.4 (26 Apr 2022 21:49)  HR: 80 (27 Apr 2022 05:54) (80 - 86)  BP: 159/74 (27 Apr 2022 05:54) (139/52 - 159/74)  BP(mean): --  RR: 16 (27 Apr 2022 05:54) (16 - 17)  SpO2: 97% (27 Apr 2022 05:54) (96% - 97%)    Constitutional: patient resting comfortably in bed, in no acute distress  HEENT: EOMI, ELVIA, BRIAM.  Respiratory: Non labored breathing on RA  Cardiovascular: RRR  Gastrointestinal: Abdomen soft, non-tender, non-distended, no rebound tenderness / guarding  Musculoskeletal: No joint pain, swelling or deformity; no limitation of movement  Vascular: no edema, bypass palpable distally, strong LLE AT. Groin without hematoma, dressings clean.       I&O's Detail    25 Apr 2022 07:01  -  26 Apr 2022 07:00  --------------------------------------------------------  IN:    Oral Fluid: 350 mL    Other (mL): 900 mL  Total IN: 1250 mL    OUT:    Other (mL): 1900 mL    Voided (mL): 200 mL  Total OUT: 2100 mL    Total NET: -850 mL      26 Apr 2022 07:01  -  27 Apr 2022 06:55  --------------------------------------------------------  IN:    Oral Fluid: 840 mL  Total IN: 840 mL    OUT:    Voided (mL): 0 mL  Total OUT: 0 mL    Total NET: 840 mL          LABS:                RADIOLOGY & ADDITIONAL STUDIES:

## 2022-05-09 NOTE — ED ADULT NURSE REASSESSMENT NOTE - NS ED NURSE REASSESS COMMENT FT1
Pt report received, pt in no acute distress VSS pending admission , due for dialysis today, family at bedside john rangel

## 2022-05-09 NOTE — RAPID RESPONSE TEAM SUMMARY - NSSITUATIONBACKGROUNDRRT_GEN_ALL_CORE
Situation:   Patient is a 87y Male admitted for Acute GI bleed. RRT called @ 9:04 because of BRBPR. Per RN, Pt called out for N as pt needed to have BM/  While put on bedpan, pt had BRBPR.  Pt was also noted to be confused A&O to self. GI Attending bedside with pt at time.    Vitals review:  T(C): 36.9 (05-09-22 @ 07:29), Max: 36.9 (05-09-22 @ 07:29)  HR: 71 (05-09-22 @ 07:29) (71 - 81)  BP: 139/66 (05-09-22 @ 07:29) (139/66 - 145/87)  RR: 20 (05-09-22 @ 07:29) (20 - 20)  SpO2: 98% (05-09-22 @ 07:29) (98% - 98%)    Background:  HPI:  87M h/o GI bleed 2/2 AVMs, ESRD on HD (M/F), CAD, HTN, PPM , recent hospitalization for  pvd , was discharged on lovenox tx dose,   p/w 2 episodes of BPBPR tonight. No stool, just bright red blood and clots. +weakness. Had an episode of SOB at home, since resolved. +pale skin. Was recently admitted for LLE bypass and restarted on Lovenox. Denies CP, fever, syncope, nausea, vomiting, abd pain.  now RRT in ER for another episode of large BRBPR      (09 May 2022 09:15)      Patient was seen and examined by Rapid response team

## 2022-05-09 NOTE — CONSULT NOTE ADULT - SUBJECTIVE AND OBJECTIVE BOX
[facility], [place] DIVISION OF KIDNEY DISEASES AND HYPERTENSION -- INITIAL CONSULT NOTE  --------------------------------------------------------------------------------  HPI:   Pt is an 86 y/o M pmhx of ESRD (M/F), HFpEF, HTN, HLD, CAD, A-fib, and LLE DVT, previous GIB on flecainide, AS s/p TAVAR, PAD< RLE fem-pop bypass, w/ revised LLE fem-bypass in march of 2021, complicated by LLE SFA and pop artery occlusion w/ DP and AT runoff s/p L fem to AT bypass w/ cryovein on 4/13/22 presents to [facility] ED for 3-4 BRBPR since last night.  Pt  was recently d/c'ed from [facility] on Lovenox 1mg/kg/day for previous bypass occlusion.   RRT called this am for ongoing bleeding-  Pt now admitted to MICU.    Nephrology consulted for HD.  Pt well known to us - gets HD M-F schedule at [facility].  Last HD was on Friday.        PAST HISTORY  --------------------------------------------------------------------------------  PAST MEDICAL & SURGICAL HISTORY:  DM (diabetes mellitus)  - resolved    AV block, 1st degree    Arrhythmia    CAD (coronary artery disease)    HTN (hypertension)    VT (ventricular tachycardia)    RICHARDS (dyspnea on exertion)    Anemia    Risk factors for obstructive sleep apnea    ESRD on dialysis  HD on Mondays and Fridays    Aortic stenosis  - s/p valve replacement    GI bleeding  due to ulcerated polyps and colonic AVMs    H/O circumcision  at  age  65    H/O left knee surgery    H/O angioplasty  2013,  no  intervention    A-V fistula  left arm 5/2017    H/O carotid endarterectomy  Right    S/P TAVR (transcatheter aortic valve replacement)    History of loop recorder      FAMILY HISTORY:  Family history of cancer (Grandparent)    Family history of lung cancer (Grandparent)    Family history of premature CAD    FH: type 2 diabetes      PAST SOCIAL HISTORY: lives at home    ALLERGIES & MEDICATIONS  --------------------------------------------------------------------------------  Allergies  Plavix (Hives)  Toprol-XL (Rash)      Standing Inpatient Medications  atorvastatin 80 milliGRAM(s) Oral at bedtime  cadexomer iodine 0.9% Gel 1 Application(s) Topical two times a day  chlorhexidine 2% Cloths 1 Application(s) Topical <User Schedule>  isosorbide   mononitrate ER Tablet (IMDUR) 30 milliGRAM(s) Oral daily  melatonin 3 milliGRAM(s) Oral at bedtime  multivitamin Oral Tab/Cap - Peds 1 Tablet(s) Oral daily  NIFEdipine XL 90 milliGRAM(s) Oral daily  pantoprazole Infusion 8 mG/Hr IV Continuous <Continuous>  tamsulosin 0.4 milliGRAM(s) Oral at bedtime  torsemide 20 milliGRAM(s) Oral <User Schedule>  traZODone 50 milliGRAM(s) Oral at bedtime    PRN Inpatient Medications      REVIEW OF SYSTEMS  --------------------------------------------------------------------------------  Gen: No weight changes, fatigue, fevers/chills, weakness  Skin: No rashes  Head/Eyes/Ears/Mouth: No headache; Normal hearing; Normal vision w/o blurriness; No sinus pain/discomfort, sore throat  Respiratory: No dyspnea, cough, wheezing, hemoptysis  CV: No chest pain, PND, orthopnea  GI: No abdominal pain, diarrhea, constipation, nausea, vomiting, melena, hematochezia  : No increased frequency, dysuria, hematuria, nocturia  MSK: No joint pain/swelling; no back pain; no edema  Neuro: No dizziness/lightheadedness, weakness, seizures, numbness, tingling  Heme: No easy bruising or bleeding  Endo: No heat/cold intolerance  Psych: No significant nervousness, anxiety, stress, depression    All other systems were reviewed and are negative, except as noted.    VITALS/PHYSICAL EXAM  --------------------------------------------------------------------------------  T(C): 36.7 (05-09-22 @ 10:31), Max: 36.9 (05-09-22 @ 07:29)  HR: 70 (05-09-22 @ 11:00) (70 - 81)  BP: 131/64 (05-09-22 @ 11:00) (131/64 - 157/77)  RR: 14 (05-09-22 @ 11:00) (14 - 20)  SpO2: 100% (05-09-22 @ 11:00) (97% - 100%)  Wt(kg): --  Height (cm): 165.1 (05-09-22 @ 04:31)      Physical Exam:  	Gen: NAD, well-appearing  	HEENT: PERRL, supple neck, clear oropharynx  	Pulm: CTA B/L  	CV: RRR, S1S2; no rub  	Back: No spinal or CVA tenderness; no sacral edema  	Abd: +BS, soft, nontender/nondistended  	: No suprapubic tenderness  	UE: Warm, FROM, no clubbing, intact strength; no edema; no asterixis  	LE: Warm, FROM, no clubbing, intact strength; no edema  	Neuro: No focal deficits, intact gait  	Psych: Normal affect and mood  	Skin: Warm, without rashes  	Vascular access:    LABS/STUDIES  --------------------------------------------------------------------------------              8.1    5.13  >-----------<  197      [05-09-22 @ 09:20]              26.4     140  |  97  |  74.5  ----------------------------<  124      [05-09-22 @ 04:51]  4.6   |  25.0  |  5.27        Ca     9.2     [05-09-22 @ 04:51]    TPro  7.0  /  Alb  3.8  /  TBili  0.6  /  DBili  x   /  AST  27  /  ALT  15  /  AlkPhos  221  [05-09-22 @ 04:51]    PT/INR: PT 13.8 , INR 1.19       [05-09-22 @ 09:20]  PTT: 38.8       [05-09-22 @ 09:20]      Creatinine Trend:  SCr 5.27 [05-09 @ 04:51]  SCr 5.39 [04-25 @ 06:26]  SCr 4.53 [04-24 @ 09:32]  SCr 3.55 [04-23 @ 05:59]  SCr 5.66 [04-22 @ 05:29]    Urinalysis - [06-04-21 @ 07:54]      Color Yellow / Appearance Clear / SG 1.010 / pH 8.0      Gluc Negative / Ketone Negative  / Bili Negative / Urobili Negative       Blood Large / Protein 100 / Leuk Est Negative / Nitrite Negative      RBC 3-5 / WBC 3-5 / Hyaline  / Gran  / Sq Epi  / Non Sq Epi Negative / Bacteria Negative      Iron 43, TIBC 249, %sat 17      [07-13-21 @ 16:07]  Ferritin 498      [07-13-21 @ 16:07]  HbA1c 4.9      [08-09-18 @ 05:54]  Lipid: chol 126, , HDL 60, LDL --      [06-03-21 @ 06:07]    HBsAb <3.0      [04-21-22 @ 22:28]  HBsAb Nonreact      [06-17-21 @ 11:47]  HBsAg Nonreact      [04-21-22 @ 22:28]  HBcAb Nonreact      [06-25-20 @ 02:07]  HCV 0.17, Nonreact      [04-21-22 @ 22:28]     [facility] DIVISION OF KIDNEY DISEASES AND HYPERTENSION -- INITIAL CONSULT NOTE  --------------------------------------------------------------------------------  HPI:   Pt is an 86 y/o M pmhx of ESRD (M/F), HFpEF, HTN, HLD, CAD, A-fib, and LLE DVT, previous GIB on flecainide, AS s/p TAVAR, PAD< RLE fem-pop bypass, w/ revised LLE fem-bypass in march of 2021, complicated by LLE SFA and pop artery occlusion w/ DP and AT runoff s/p L fem to AT bypass w/ cryovein on 4/13/22 presents to [facility] ED for 3-4 BRBPR since last night.  Pt  was recently d/c'ed from [facility] on Lovenox 1mg/kg/day for previous bypass occlusion.   RRT called this am for ongoing bleeding-  Pt now admitted to MICU.    Nephrology consulted for HD.  Pt well known to us - gets HD M-F schedule at [facility].  Last HD was on Friday.  Pt seen and examined; resting at this time.  Daughter at bedside.      PAST HISTORY  --------------------------------------------------------------------------------  PAST MEDICAL & SURGICAL HISTORY:  DM (diabetes mellitus)  - resolved    AV block, 1st degree    Arrhythmia    CAD (coronary artery disease)    HTN (hypertension)    VT (ventricular tachycardia)    RICHARDS (dyspnea on exertion)    Anemia    Risk factors for obstructive sleep apnea    ESRD on dialysis  HD on Mondays and Fridays    Aortic stenosis  - s/p valve replacement    GI bleeding  due to ulcerated polyps and colonic AVMs    H/O circumcision  at  age  65    H/O left knee surgery    H/O angioplasty  2013,  no  intervention    A-V fistula  left arm 5/2017    H/O carotid endarterectomy  Right    S/P TAVR (transcatheter aortic valve replacement)    History of loop recorder      FAMILY HISTORY:  Family history of cancer (Grandparent)    Family history of lung cancer (Grandparent)    Family history of premature CAD    FH: type 2 diabetes      PAST SOCIAL HISTORY: lives at home    ALLERGIES & MEDICATIONS  --------------------------------------------------------------------------------  Allergies  Plavix (Hives)  Toprol-XL (Rash)      Standing Inpatient Medications  atorvastatin 80 milliGRAM(s) Oral at bedtime  cadexomer iodine 0.9% Gel 1 Application(s) Topical two times a day  chlorhexidine 2% Cloths 1 Application(s) Topical <User Schedule>  isosorbide   mononitrate ER Tablet (IMDUR) 30 milliGRAM(s) Oral daily  melatonin 3 milliGRAM(s) Oral at bedtime  multivitamin Oral Tab/Cap - Peds 1 Tablet(s) Oral daily  NIFEdipine XL 90 milliGRAM(s) Oral daily  pantoprazole Infusion 8 mG/Hr IV Continuous <Continuous>  tamsulosin 0.4 milliGRAM(s) Oral at bedtime  torsemide 20 milliGRAM(s) Oral <User Schedule>  traZODone 50 milliGRAM(s) Oral at bedtime    PRN Inpatient Medications      REVIEW OF SYSTEMS  --------------------------------------------------------------------------------  unable to obtain    VITALS/PHYSICAL EXAM  --------------------------------------------------------------------------------  T(C): 36.7 (05-09-22 @ 10:31), Max: 36.9 (05-09-22 @ 07:29)  HR: 70 (05-09-22 @ 11:00) (70 - 81)  BP: 131/64 (05-09-22 @ 11:00) (131/64 - 157/77)  RR: 14 (05-09-22 @ 11:00) (14 - 20)  SpO2: 100% (05-09-22 @ 11:00) (97% - 100%)  Wt(kg): --  Height (cm): 165.1 (05-09-22 @ 04:31)      Physical Exam:  	Gen: NAD, resting  	HEENT- Supple neck  	Pulm: CTA B/L  	CV: RRR, S1S2; no rub  	Abd: +BS, soft, nontender/nondistended  	: No suprapubic tenderness  	UE: Warm,  no edema  	LE: Warm,  edema+  	Neuro: Non focal  	Skin: Warm, pallor  	Vascular access: AVF    LABS/STUDIES  --------------------------------------------------------------------------------              8.1    5.13  >-----------<  197      [05-09-22 @ 09:20]              26.4     140  |  97  |  74.5  ----------------------------<  124      [05-09-22 @ 04:51]  4.6   |  25.0  |  5.27        Ca     9.2     [05-09-22 @ 04:51]    TPro  7.0  /  Alb  3.8  /  TBili  0.6  /  DBili  x   /  AST  27  /  ALT  15  /  AlkPhos  221  [05-09-22 @ 04:51]    PT/INR: PT 13.8 , INR 1.19       [05-09-22 @ 09:20]  PTT: 38.8       [05-09-22 @ 09:20]      Creatinine Trend:  SCr 5.27 [05-09 @ 04:51]  SCr 5.39 [04-25 @ 06:26]  SCr 4.53 [04-24 @ 09:32]  SCr 3.55 [04-23 @ 05:59]  SCr 5.66 [04-22 @ 05:29]    Urinalysis - [06-04-21 @ 07:54]      Color Yellow / Appearance Clear / SG 1.010 / pH 8.0      Gluc Negative / Ketone Negative  / Bili Negative / Urobili Negative       Blood Large / Protein 100 / Leuk Est Negative / Nitrite Negative      RBC 3-5 / WBC 3-5 / Hyaline  / Gran  / Sq Epi  / Non Sq Epi Negative / Bacteria Negative      Iron 43, TIBC 249, %sat 17      [07-13-21 @ 16:07]  Ferritin 498      [07-13-21 @ 16:07]  HbA1c 4.9      [08-09-18 @ 05:54]  Lipid: chol 126, , HDL 60, LDL --      [06-03-21 @ 06:07]    HBsAb <3.0      [04-21-22 @ 22:28]  HBsAb Nonreact      [06-17-21 @ 11:47]  HBsAg Nonreact      [04-21-22 @ 22:28]  HBcAb Nonreact      [06-25-20 @ 02:07]  HCV 0.17, Nonreact      [04-21-22 @ 22:28]

## 2022-05-09 NOTE — H&P ADULT - HISTORY OF PRESENT ILLNESS
87M h/o GI bleed 2/2 AVMs, ESRD on HD (M/F), CAD, HTN, PPM , recent hospitalization for  pvd , was dcd on lovenox tx dose ,   p/w 2 episodes of BPBPR tonight. No stool, just bright red blood and clots. +weakness. Had an episode of SOB at home, since resolved. +pale skin. Was recently admitted for LLE bypass and restarted on lovenox. Denies CP, fever, syncope, nausea, vomiting, abd pain.  now RRT in er for another episode of large BRBPR      87M h/o GI bleed 2/2 AVMs, ESRD on HD (M/F), CAD, HTN, PPM ,  CHFpEF, arrythmia, HTN, HLD, CAD, a-fib and LLE DVT,  AS s/p TAVR, and PAD (RLE fem-pop bypass, revised LLE fem-pop bypass, March 2021, CFA to AT with cryovein ) , recent hospitalization for left leg pvd , redness , and for left bypass graft  thrombosed. s/p revision of the bypass with thrombectomy. patient was dcd on  Lovenox 1mg/kg/day,  now presents with 3-4 episodes of BPBPR started last night , and was chano to er by daughter.   patient had another large BRBPR in er , RRT was called. sbp in 120s-150s. Micu consulted.

## 2022-05-09 NOTE — CONSULT NOTE ADULT - PROBLEM SELECTOR RECOMMENDATION 9
Most likely diverticular bleed vs hemorrhoidal bleeding in setting of therapeutic Full dose Lovenox. Reports 2 episodes of rectal bleed prior to admissions. No further episodes thereafter. Hemoglobin 8.7 (patient's base line 8-9 gm). ANDRE with burgundy color with some clots.   CTA angio for further evaluation  Nephrology consult, plan for HD after CTA today (patient's M/F schedule).   Please transfuse 1 units of PRBC with dialysis.   Protonix 40 mg BID  Trend CBC  Further plan pending CTA

## 2022-05-09 NOTE — CONSULT NOTE ADULT - SUBJECTIVE AND OBJECTIVE BOX
HISTORY OF PRESENT ILLNESS: 87M with PMH of ESRD on HD (M/F), anemia, CHFpEF, arrythmia, HTN, HLD, CAD, a-fib and LLE DVT (used to be on Flecainide and no longer on AC due to GI bleed), AS s/p TAVR, and PAD (RLE fem-pop bypass, revised LLE fem-pop bypass, March 2021, CFA to AT with cryovein ), recent admission to [facility] for left LE pain, diagnosed with  left lower extremity  occlusion of the SFA and popliteal artery with distal peroneal and AT run off, s/p left femoral to AT bypass with cryovein on 4/13. s/p thrombectomy (04/14), s/p angioplasty of the graft with thrombectomy on 4/20. Patient was discharged home on 04/27 with therapeutic dose of  Lovenox. GI consult for GI bleed. At the time of my evaluation patient lying in bed, reported "tiered and cold". HPI obtained from patient's daughter who is ADN of this ED. Per daughter, patient had 2 BM with blood clots today morning. His last diose of Lovenox yesterday noon. Patient denies abdominal pain, nausea, vomiting, chest pain. His hemoglobin 8.7 gm.      Review of Systems:  · ENMT: negative  · Respiratory and Thorax: Denies cough. +shortness fo breath with exertion   · Cardiovascular: denies chest pain, palpitation.   · Gastrointestinal: see above.  · Genitourinary:	negative  · Musculoskeletal: negative  · Neurological: negative  · Psychiatric: negative  · Hematology/Lymphatics: negative  · Endocrine: negative      PAST MEDICAL/SURGICAL HISTORY:  DM (diabetes mellitus)  - resolved    AV block, 1st degree    Arrhythmia    CAD (coronary artery disease)    HTN (hypertension)    VT (ventricular tachycardia)    RICHARDS (dyspnea on exertion)    Anemia    Risk factors for obstructive sleep apnea    ESRD on dialysis  HD on Mondays and Fridays    Aortic stenosis  - s/p valve replacement    GI bleeding  due to ulcerated polyps and colonic AVMs    H/O circumcision  at  age  65    H/O left knee surgery    H/O angioplasty  2013,  no  intervention    A-V fistula  left arm 5/2017    H/O carotid endarterectomy  Right    S/P TAVR (transcatheter aortic valve replacement)    History of loop recorder      SOCIAL HISTORY:  - TOBACCO: Denies  - ALCOHOL: Denies  - ILLICIT DRUG USE: Denies    FAMILY HISTORY:  No known history of gastrointestinal or liver disease;  Family history of cancer (Grandparent)    Family history of lung cancer (Grandparent)    Family history of premature CAD    FH: type 2 diabetes        HOME MEDICATIONS:  acetaminophen 325 mg oral tablet: 3 tab(s) orally every 6 hours, As needed, Mild Pain (1 - 3) (09 May 2022 06:59)  aspirin 81 mg oral tablet, chewable: 1 tab(s) orally once a day (09 May 2022 06:59)  atorvastatin 80 mg oral tablet: 1 tab(s) orally once a day (at bedtime) (09 May 2022 06:59)  DULoxetine 30 mg oral delayed release capsule: 1 cap(s) orally once a day (09 May 2022 06:59)  Melatonin 3 mg oral tablet: 1 tab(s) orally once a day (at bedtime) (09 May 2022 06:59)  Nephro-Thomas oral tablet: 1 tab(s) orally once a day (09 May 2022 06:59)  NIFEdipine 60 mg oral tablet, extended release: orally 5 times a week  take on Sunday, Tuesday, Wednesday, Thursday, Saturday    Non dialysis (09 May 2022 06:59)  NIFEdipine 90 mg oral tablet, extended release: 1 tab(s) orally once a day (09 May 2022 06:59)  sevelamer hydrochloride 800 mg oral tablet: 2 tab(s) orally 3 times a day (09 May 2022 06:59)  Super B Complex: 1 tab(s) orally once a day (09 May 2022 06:59)  torsemide 20 mg oral tablet: 1 tab(s) orally on non HD days (09 May 2022 06:59)  traZODone 50 mg oral tablet: 1 tab(s) orally once a day (at bedtime) (09 May 2022 06:59)    INPATIENT MEDICATIONS:  MEDICATIONS  (STANDING):    MEDICATIONS  (PRN):    ALLERGIES:  Plavix (Hives)  Toprol-XL (Rash)    T(C): 36.9 (05-09-22 @ 07:29), Max: 36.9 (05-09-22 @ 07:29)  HR: 71 (05-09-22 @ 07:29) (71 - 81)  BP: 139/66 (05-09-22 @ 07:29) (139/66 - 145/87)  RR: 20 (05-09-22 @ 07:29) (20 - 20)  SpO2: 98% (05-09-22 @ 07:29) (98% - 98%)      PHYSICAL EXAM:    Constitutional: Elderly, frail male, in no acute distress  Neuro: Awake alert, oriented  HEENT: PERRL, anicteric sclerae  Neck: supple, no JVD  CV: regular rate, regular rhythm, +S1S2,   Pulm/chest: lung sounds clear bilaterally, no accessory muscle use noted  Abd: soft, NT, ND, +BS. No rigidity, guarding, rebound tenderness    Ext: no Cyanosis, clubbing, trace BLE. +LLE dressing   Skin: warm, well perfused, no jaundice   Psych: calm, appropriate affect      LABS:             8.7    6.73  )-----------( 215      ( 05-09 @ 04:51 )             28.0       PT/INR - ( 09 May 2022 04:51 )   PT: 13.6 sec;   INR: 1.17 ratio         PTT - ( 09 May 2022 04:51 )  PTT:41.9 sec  05-09    140  |  97<L>  |  74.5<H>  ----------------------------<  124<H>  4.6   |  25.0  |  5.27<H>    Ca    9.2      09 May 2022 04:51    TPro  7.0  /  Alb  3.8  /  TBili  0.6  /  DBili  x   /  AST  27  /  ALT  15  /  AlkPhos  221<H>  05-09    LIVER FUNCTIONS - ( 09 May 2022 04:51 )  Alb: 3.8 g/dL / Pro: 7.0 g/dL / ALK PHOS: 221 U/L / ALT: 15 U/L / AST: 27 U/L / GGT: x                HISTORY OF PRESENT ILLNESS: 87M with PMH of ESRD on HD (M/F), anemia, CHFpEF, arrythmia, HTN, HLD, CAD, a-fib and LLE DVT (used to be on Flecainide and no longer on AC due to GI bleed), AS s/p TAVR, and PAD (RLE fem-pop bypass, revised LLE fem-pop bypass, March 2021, CFA to AT with cryovein ), recent admission to [facility] for left LE pain, diagnosed with  left lower extremity  occlusion of the SFA and popliteal artery with distal peroneal and AT run off, s/p left femoral to AT bypass with cryovein on 4/13. s/p thrombectomy (04/14), s/p angioplasty of the graft with thrombectomy on 4/20. Patient was discharged home on 04/27 with therapeutic dose of  Lovenox. GI consult for GI bleed. At the time of my evaluation patient lying in bed, reported "tiered and cold". HPI obtained from patient's daughter who is ADN of this ED. Per daughter, patient had 2 BM with blood clots today morning. His last diose of Lovenox yesterday noon. Patient denies abdominal pain, nausea, vomiting, chest pain. His hemoglobin 8.7 gm.  His last EGD on November 2021 reveals erosions and ulceration in the pre-pyloric region compatible with erosive gastritis.     Review of Systems:  · ENMT: negative  · Respiratory and Thorax: Denies cough. +shortness fo breath with exertion   · Cardiovascular: denies chest pain, palpitation.   · Gastrointestinal: see above.  · Genitourinary:	negative  · Musculoskeletal: negative  · Neurological: negative  · Psychiatric: negative  · Hematology/Lymphatics: negative  · Endocrine: negative      PAST MEDICAL/SURGICAL HISTORY:  DM (diabetes mellitus)  - resolved    AV block, 1st degree    Arrhythmia    CAD (coronary artery disease)    HTN (hypertension)    VT (ventricular tachycardia)    RICHARDS (dyspnea on exertion)    Anemia    Risk factors for obstructive sleep apnea    ESRD on dialysis  HD on Mondays and Fridays    Aortic stenosis  - s/p valve replacement    GI bleeding  due to ulcerated polyps and colonic AVMs    H/O circumcision  at  age  65    H/O left knee surgery    H/O angioplasty  2013,  no  intervention    A-V fistula  left arm 5/2017    H/O carotid endarterectomy  Right    S/P TAVR (transcatheter aortic valve replacement)    History of loop recorder      SOCIAL HISTORY:  - TOBACCO: Denies  - ALCOHOL: Denies  - ILLICIT DRUG USE: Denies    FAMILY HISTORY:  No known history of gastrointestinal or liver disease;  Family history of cancer (Grandparent)    Family history of lung cancer (Grandparent)    Family history of premature CAD    FH: type 2 diabetes        HOME MEDICATIONS:  acetaminophen 325 mg oral tablet: 3 tab(s) orally every 6 hours, As needed, Mild Pain (1 - 3) (09 May 2022 06:59)  aspirin 81 mg oral tablet, chewable: 1 tab(s) orally once a day (09 May 2022 06:59)  atorvastatin 80 mg oral tablet: 1 tab(s) orally once a day (at bedtime) (09 May 2022 06:59)  DULoxetine 30 mg oral delayed release capsule: 1 cap(s) orally once a day (09 May 2022 06:59)  Melatonin 3 mg oral tablet: 1 tab(s) orally once a day (at bedtime) (09 May 2022 06:59)  Nephro-Thomas oral tablet: 1 tab(s) orally once a day (09 May 2022 06:59)  NIFEdipine 60 mg oral tablet, extended release: orally 5 times a week  take on Sunday, Tuesday, Wednesday, Thursday, Saturday    Non dialysis (09 May 2022 06:59)  NIFEdipine 90 mg oral tablet, extended release: 1 tab(s) orally once a day (09 May 2022 06:59)  sevelamer hydrochloride 800 mg oral tablet: 2 tab(s) orally 3 times a day (09 May 2022 06:59)  Super B Complex: 1 tab(s) orally once a day (09 May 2022 06:59)  torsemide 20 mg oral tablet: 1 tab(s) orally on non HD days (09 May 2022 06:59)  traZODone 50 mg oral tablet: 1 tab(s) orally once a day (at bedtime) (09 May 2022 06:59)    INPATIENT MEDICATIONS:  MEDICATIONS  (STANDING):    MEDICATIONS  (PRN):    ALLERGIES:  Plavix (Hives)  Toprol-XL (Rash)    T(C): 36.9 (05-09-22 @ 07:29), Max: 36.9 (05-09-22 @ 07:29)  HR: 71 (05-09-22 @ 07:29) (71 - 81)  BP: 139/66 (05-09-22 @ 07:29) (139/66 - 145/87)  RR: 20 (05-09-22 @ 07:29) (20 - 20)  SpO2: 98% (05-09-22 @ 07:29) (98% - 98%)      PHYSICAL EXAM:    Constitutional: Elderly, frail male, in no acute distress  Neuro: Awake alert, oriented  HEENT: PERRL, anicteric sclerae  Neck: supple, no JVD  CV: regular rate, regular rhythm, +S1S2,   Pulm/chest: lung sounds clear bilaterally, no accessory muscle use noted  Abd: soft, NT, ND, +BS. No rigidity, guarding, rebound tenderness    Ext: no Cyanosis, clubbing, trace BLE. +LLE dressing   Skin: warm, well perfused, no jaundice   Psych: calm, appropriate affect      LABS:             8.7    6.73  )-----------( 215      ( 05-09 @ 04:51 )             28.0       PT/INR - ( 09 May 2022 04:51 )   PT: 13.6 sec;   INR: 1.17 ratio         PTT - ( 09 May 2022 04:51 )  PTT:41.9 sec  05-09    140  |  97<L>  |  74.5<H>  ----------------------------<  124<H>  4.6   |  25.0  |  5.27<H>    Ca    9.2      09 May 2022 04:51    TPro  7.0  /  Alb  3.8  /  TBili  0.6  /  DBili  x   /  AST  27  /  ALT  15  /  AlkPhos  221<H>  05-09    LIVER FUNCTIONS - ( 09 May 2022 04:51 )  Alb: 3.8 g/dL / Pro: 7.0 g/dL / ALK PHOS: 221 U/L / ALT: 15 U/L / AST: 27 U/L / GGT: x           < from: EGD (11.23.21 @ 12:22) >  Findings:     Esophagus Mucosa Normal mucosa was noted in the whole esophagus.     Stomach Mucosa Localized erosions and ulceration of the mucosa was noted in the    pre-pyloric region. These findings are compatible with erosive gastritis.     Additional stomach findings Otherwise normal stomach..     Duodenum Mucosa Normal mucosa was noted in the whole examined duodenum.     Impressions:      Normal mucosa in the whole esophagus.      Erosions and ulceration in the pre-pyloric region compatible with erosive    gastritis.      Normal mucosa in the whole examined duodenum.      Otherwise normal stomach. .     < end of copied text >

## 2022-05-09 NOTE — ED PROVIDER NOTE - PHYSICAL EXAMINATION
Gen: Well appearing in NAD  Head: NC/AT  Neck: trachea midline  Resp:  No distress, CTAB  CV: RRR  GI: soft, NTND  Ext: LLE graft site clean/dry/intact, dry gangrene to 3rd-5th toes on left  Neuro:  A&O appears non focal  Skin:  +Pale skin  Psych:  Normal affect and mood

## 2022-05-09 NOTE — CHART NOTE - NSCHARTNOTEFT_GEN_A_CORE
Patient admitted for concerned for GIB  GI consulted, rec CTA ab for further Eval  patient has Hx of ESRD  Discussed with nephrology and will coordinate patient to be dialyzed after CTA ab   At this time CTA is medically necessary for GIB work up

## 2022-05-09 NOTE — RAPID RESPONSE TEAM SUMMARY - NSADDTLFINDINGSRRT_GEN_ALL_CORE
VS:  Temp: 97.8 F , BP: 146/70, HR: 70, RR:18% , O2 sat: 96%  Blood sugar:112  PHYSICAL EXAM:  GENERAL: Pt lying in bed, mildly confused  HEAD:  Atraumatic   EYES: Conjunctiva and sclera clear  ENT: Dry mucous membranes  NECK: Supple, No JVD  CHEST/LUNG: No rales, rhonchi or wheezing. Unlabored respirations  HEART: Regular rate and rhythm   ABDOMEN: Bowel sounds present; Soft, Nontender, Nondistended. No guarding or rigidity    EXTREMITIES:  No clubbing, cyanosis, or edema  NERVOUS SYSTEM:  Alert & Oriented to self . No acute deficits   MSK: FROM x 4 extremities   SKIN: left lower extremity dressing noted to anterior shin.  Mild bruising noted on abdomen        CXR: Compared to previous CXR     EKG: NSR, HR ___ bpm , no acute ST or T wave segment changes compared to previous EKG

## 2022-05-09 NOTE — H&P ADULT - ASSESSMENT
87M h/o GI bleed 2/2 AVMs, ESRD on HD (M/F), CAD, HTN, PPM ,  CHFpEF, arrythmia, HTN, HLD, CAD, a-fib and LLE DVT,  AS s/p TAVR, and PAD (RLE fem-pop bypass, revised LLE fem-pop bypass, March 2021, CFA to AT with cryovein ) , recent hospitalization for left leg pvd , redness , and for left bypass graft  thrombosed. s/p revision of the bypass with thrombectomy. patient was dcd on  Lovenox 1mg/kg/day,  now presents with 3-4 episodes of BPBPR started last night , and was chano to er by daughter.   patient had another large BRBPR in er , RRT was called. sbp in 120s-150s. Micu consulted.       > PRBPR / acute gib / likely lower gi   - hx of gib in past , h xof AVMs   - patient was dcd on 1mg /kg / qd dose of lovenox after last admission , now dcd   - admit to micu   - repeat h/h . hb this am 8.1 , possibly heme concentrated.   - monitor h/h ,  1 unit prbcs ordered. consent  in chart   - gi consulted , CTA abd / pelvis urgent / nephro aware / hd after cta   - ppi bid iv     >pvd/ recent left lower ext bybass revision   - vascualr on board   - hold off on AC for now due to active gib     > esrd :   - hd on mondays / fridays as per op schedule   - plan for hd after cta today , nephro dr. curry aware     >chronic pafib/ chronic chf    - c/w ccb   - c/w torsemide on non hd days   - hold ac for now     > goc;: full  code  87M h/o GI bleed 2/2 AVMs, ESRD on HD (M/F), CAD, HTN, PPM ,  CHFpEF, arrythmia, HTN, HLD, CAD, a-fib and LLE DVT,  AS s/p TAVR, and PAD (RLE fem-pop bypass, revised LLE fem-pop bypass, March 2021, CFA to AT with cryovein ) , recent hospitalization for left leg pvd , redness , and for left bypass graft  thrombosed. s/p revision of the bypass with thrombectomy. patient was dcd on  Lovenox 1mg/kg/day,  now presents with 3-4 episodes of BPBPR started last night , and was chano to er by daughter.   patient had another large BRBPR in er , RRT was called. sbp in 120s-150s. Micu consulted.       > PRBPR / acute gib / likely lower gi   - hx of gib in past , h xof AVMs   - patient was dcd on 1mg /kg / qd dose of lovenox after last admission , now dcd   - admit to micu   - repeat h/h . hb this am 8.1 , possibly heme concentrated.   - monitor h/h ,  1 unit prbcs ordered. consent  in chart   - gi consulted , CTA abd / pelvis urgent / nephro aware / hd after cta   - ppi bid iv     >pvd/ recent left lower ext bybass revision   - vascualr on board   - hold off on AC for now due to active gib     > esrd :   - hd on mondays / fridays as per op schedule   - plan for hd after cta today , nephro dr. curry aware     >chronic pafib/ chronic chf    - c/w ccb   - c/w torsemide on non hd days   - hold ac for now     > goc;: full  code     > plan of care discussed with patient , daughter in detail at bedside and again called on phone and updated of rrt. plan of care discussed with micu , gi

## 2022-05-09 NOTE — CONSULT NOTE ADULT - ASSESSMENT
87M with PMH of ESRD on HD (M/F), anemia, CHFpEF, arrythmia, HTN, HLD, CAD, a-fib and LLE DVT (used to be on Flecainide and no longer on AC due to GI bleed), AS s/p TAVR, and PAD (RLE fem-pop bypass, revised LLE fem-pop bypass, March 2021, CFA to AT with cryovein ),   left lower extremity SFA and popliteal artery  occlusion with distal peroneal and AT run off, s/p left femoral to AT bypass with cryovein on 4/13. s/p thrombectomy (04/14), s/p angioplasty of the graft with thrombectomy on 4/20. Patient was discharged home on 04/27 with therapeutic dose of  Lovenox. GI consult for GI bleed.

## 2022-05-09 NOTE — CONSULT NOTE ADULT - NS ATTEND AMEND GEN_ALL_CORE FT
Patient seen and examined.  Patient with continued lower GI bleeding and now with source in the anorectal area.  We will prep with enemas and perform the flexible sigmoidoscopy at bedside.  Packed RBC transfusion in progress.  Patient seen multiple times throughout the course of the day.  Family was updated multiple times.  ICU team and nephrology team is on board.

## 2022-05-09 NOTE — ED PROVIDER NOTE - PROGRESS NOTE DETAILS
Juvencio YAÑEZ: Patient admitted to medicine. Renal/GI consults placed. Juvencio YAÑEZ: surgery consulted, will follow

## 2022-05-09 NOTE — CONSULT NOTE ADULT - ASSESSMENT
1) ESRD on HD  2) Anemia of chronic disease with superimposed GIB  3) PVD s/p recent left anterior tibial bypass in April 2022- on therapeutic Lovenox for previous bypass occlusion    s/p CTA- plan for colonoscopy  HD post contrast  s/p 1 U PRBC- repeat H/H  Transfuse prn with HD to keep Hb > 8  Resume AMY      d/w Dr. Giron

## 2022-05-09 NOTE — PATIENT PROFILE ADULT - FALL HARM RISK - HARM RISK INTERVENTIONS

## 2022-05-09 NOTE — H&P ADULT - NSHPPHYSICALEXAM_GEN_ALL_CORE
Vital Signs Last 24 Hrs  T(C): 36.9 (09 May 2022 07:29), Max: 36.9 (09 May 2022 07:29)  T(F): 98.4 (09 May 2022 07:29), Max: 98.4 (09 May 2022 07:29)  HR: 71 (09 May 2022 07:29) (71 - 81)  BP: 139/66 (09 May 2022 07:29) (139/66 - 145/87)  BP(mean): --  RR: 20 (09 May 2022 07:29) (20 - 20)  SpO2: 98% (09 May 2022 07:29) (98% - 98%) Vital Signs Last 24 Hrs  T(C): 36.9 (09 May 2022 07:29), Max: 36.9 (09 May 2022 07:29)  T(F): 98.4 (09 May 2022 07:29), Max: 98.4 (09 May 2022 07:29)  HR: 71 (09 May 2022 07:29) (71 - 81)  BP: 139/66 (09 May 2022 07:29) (139/66 - 145/87)  BP(mean): --  RR: 20 (09 May 2022 07:29) (20 - 20)  SpO2: 98% (09 May 2022 07:29) (98% - 98%)     gen: awake , alert x 2 , following simple commands , frail,   heent: mm dry , mildly pale   neck : supple   resp: clear b/l   cvs : s1, s2, rrr , no m/ r   abd: soft , nt , bowl sounds nl , mild bruising noted at lovenox shots   neuro: non focal   ext: left lower ext noted with ant shin wound , clean , dressing in place, all toes dry gangrene noted. no foul smelling discharge. Vital Signs Last 24 Hrs  T(C): 36.9 (09 May 2022 07:29), Max: 36.9 (09 May 2022 07:29)  T(F): 98.4 (09 May 2022 07:29), Max: 98.4 (09 May 2022 07:29)  HR: 71 (09 May 2022 07:29) (71 - 81)  BP: 139/66 (09 May 2022 07:29) (139/66 - 145/87)  BP(mean): --  RR: 20 (09 May 2022 07:29) (20 - 20)  SpO2: 98% (09 May 2022 07:29) (98% - 98%)    gen: awake , alert x 2 , following simple commands , frail,   heent: mm dry , mildly pale   neck : supple   resp: clear b/l   cvs : s1, s2, rrr , no m/ r   abd: soft , nt , bowl sounds nl , mild bruising noted at lovenox shots   neuro: non focal   ext: left lower ext noted with ant shin wound , clean , dressing in place, all toes dry gangrene noted. no foul smelling discharge.

## 2022-05-09 NOTE — CONSULT NOTE ADULT - SUBJECTIVE AND OBJECTIVE BOX
Patient is a 87y old  Male who presents with a chief complaint of gib (09 May 2022 09:15)      BRIEF HOSPITAL COURSE: Pt is an 88 y/o M pmhx of ESRD (M/F), HFpEF, HTN, HLD, CAD, A-fib, and LLE DVT, previous GIB on flecainide, AS s/p TAVAR, PAD< RLE fem-pop bypass, w/ revised LLE fem-bypass in march of 2021, complicated by LLE SFA and pop artery occlusion w/ DP and AT runoff s/p L fem to AT bypass w/ cryovein on 4/13/22 presents to [facility] ED for 3-4 BRBPR since last night. Of note pt was d/c'ed from previous hospital admission on Lovenox 1mg/kg/day for previous bypass occlusion. Pt subsequently had additional large BRBPR BM in ED w/ large clot, RRT called, ICU consulted for active GIB.     PAST MEDICAL & SURGICAL HISTORY:  DM (diabetes mellitus)  - resolved    AV block, 1st degree    Arrhythmia    CAD (coronary artery disease)    HTN (hypertension)    VT (ventricular tachycardia)    RICHARDS (dyspnea on exertion)    Anemia    Risk factors for obstructive sleep apnea    ESRD on dialysis  HD on Mondays and Fridays    Aortic stenosis  - s/p valve replacement    GI bleeding  due to ulcerated polyps and colonic AVMs    H/O circumcision  at  age  65    H/O left knee surgery    H/O angioplasty  2013,  no  intervention    A-V fistula  left arm 5/2017    H/O carotid endarterectomy  Right    S/P TAVR (transcatheter aortic valve replacement)    History of loop recorder      Allergies    Plavix (Hives)  Toprol-XL (Rash)    Intolerances      FAMILY HISTORY:  Family history of cancer (Grandparent)    Family history of lung cancer (Grandparent)    Family history of premature CAD    FH: type 2 diabetes        Review of Systems:  CONSTITUTIONAL: No fever, chills, or fatigue  EYES: No eye pain, visual disturbances, or discharge  ENMT:  No difficulty hearing, tinnitus, vertigo; No sinus or throat pain  NECK: No pain or stiffness  RESPIRATORY: No cough, wheezing, chills or hemoptysis; No shortness of breath  CARDIOVASCULAR: No chest pain, palpitations, dizziness, or leg swelling  GASTROINTESTINAL: Loose clotty melanotic bowel movements. No abdominal or epigastric pain. No nausea, vomiting, or hematemesis.  GENITOURINARY: No dysuria, frequency, hematuria, or incontinence  NEUROLOGICAL: No headaches, memory loss, loss of strength, numbness, or tremors  SKIN: No itching, burning, rashes, or lesions   MUSCULOSKELETAL: No joint pain or swelling; No muscle, back, or extremity pain  PSYCHIATRIC: No depression, anxiety, mood swings, or difficulty sleeping      Medications:  isosorbide   mononitrate ER Tablet (IMDUR) 30 milliGRAM(s) Oral daily  NIFEdipine XL 90 milliGRAM(s) Oral daily  tamsulosin 0.4 milliGRAM(s) Oral at bedtime  torsemide 20 milliGRAM(s) Oral <User Schedule>  melatonin 3 milliGRAM(s) Oral at bedtime  traZODone 50 milliGRAM(s) Oral at bedtime  pantoprazole  Injectable 40 milliGRAM(s) IV Push every 12 hours  atorvastatin 80 milliGRAM(s) Oral at bedtime  multivitamin Oral Tab/Cap - Peds 1 Tablet(s) Oral daily  cadexomer iodine 0.9% Gel 1 Application(s) Topical two times a day  chlorhexidine 2% Cloths 1 Application(s) Topical <User Schedule>            ICU Vital Signs Last 24 Hrs  T(C): 36.9 (09 May 2022 07:29), Max: 36.9 (09 May 2022 07:29)  T(F): 98.4 (09 May 2022 07:29), Max: 98.4 (09 May 2022 07:29)  HR: 71 (09 May 2022 07:29) (71 - 81)  BP: 139/66 (09 May 2022 07:29) (139/66 - 145/87)  BP(mean): --  ABP: --  ABP(mean): --  RR: 20 (09 May 2022 07:29) (20 - 20)  SpO2: 98% (09 May 2022 07:29) (98% - 98%)    Vital Signs Last 24 Hrs  T(C): 36.9 (09 May 2022 07:29), Max: 36.9 (09 May 2022 07:29)  T(F): 98.4 (09 May 2022 07:29), Max: 98.4 (09 May 2022 07:29)  HR: 71 (09 May 2022 07:29) (71 - 81)  BP: 139/66 (09 May 2022 07:29) (139/66 - 145/87)  BP(mean): --  RR: 20 (09 May 2022 07:29) (20 - 20)  SpO2: 98% (09 May 2022 07:29) (98% - 98%)        I&O's Detail        LABS:                        8.1    5.13  )-----------( 197      ( 09 May 2022 09:20 )             26.4     05-09    140  |  97<L>  |  74.5<H>  ----------------------------<  124<H>  4.6   |  25.0  |  5.27<H>    Ca    9.2      09 May 2022 04:51    TPro  7.0  /  Alb  3.8  /  TBili  0.6  /  DBili  x   /  AST  27  /  ALT  15  /  AlkPhos  221<H>  05-09          CAPILLARY BLOOD GLUCOSE      POCT Blood Glucose.: 112 mg/dL (09 May 2022 09:11)    PT/INR - ( 09 May 2022 09:20 )   PT: 13.8 sec;   INR: 1.19 ratio         PTT - ( 09 May 2022 09:20 )  PTT:38.8 sec    CULTURES:      Physical Examination:    General: Pt laying in hospital bed in no acute distress.  Alert, oriented, interactive.    HEENT: Pupils equal, reactive to light.  Symmetric.    PULM: Clear to auscultation bilaterally, no significant sputum production    CVS: Regular rate and rhythm, no murmurs, rubs, or gallops    ABD: Soft, nondistended, nontender, normoactive bowel sounds, no masses    EXT: B/L lower extremity 1+ edema, nontender    SKIN: Warm and well perfused    RADIOLOGY: CTA pending

## 2022-05-09 NOTE — ED ADULT TRIAGE NOTE - CHIEF COMPLAINT QUOTE
BIBEMS for GI bleed.  PT with rectal bleeding x1 day with clots. Skin is pale in color. Denies pain, lightheadedness or dizziness. PT is on Lovenox. PT also receives dialysis Monday and Friday.

## 2022-05-09 NOTE — CONSULT NOTE ADULT - SUBJECTIVE AND OBJECTIVE BOX
VASCULAR SURGERY CONSULT     HPI: 87y Male present to ED with GI bleed  Well known patient to the vascular surgery service, recent bypass with multiple reinterventions, discharged on lovenox presents with GI bleed, lower, first on sunday day had small jelly like stool, however around 0300 this morning had large clots with bowel movement. Denies acute changes in foot, has been received lovenox daily, per daughter was getting stronger    ROS: 10-system review is otherwise negative except HPI above.      PAST MEDICAL & SURGICAL HISTORY:  DM (diabetes mellitus)  - resolved    AV block, 1st degree    Arrhythmia    CAD (coronary artery disease)    HTN (hypertension)    VT (ventricular tachycardia)    RICHARDS (dyspnea on exertion)    Anemia    Risk factors for obstructive sleep apnea    ESRD on dialysis  HD on Mondays and Fridays    Aortic stenosis  - s/p valve replacement    GI bleeding  due to ulcerated polyps and colonic AVMs    H/O circumcision  at  age  65    H/O left knee surgery    H/O angioplasty  2013,  no  intervention    A-V fistula  left arm 5/2017    H/O carotid endarterectomy  Right    S/P TAVR (transcatheter aortic valve replacement)    History of loop recorder      FAMILY HISTORY:  Family history of cancer (Grandparent)    Family history of lung cancer (Grandparent)    Family history of premature CAD    FH: type 2 diabetes      Family history not pertinent as reviewed with the patient.    SOCIAL HISTORY:  Denies any toxic habits    ALLERGIES: NKA Plavix (Hives)  Toprol-XL (Rash)      HOME MEDICATIONS: ***  Home Medications:  acetaminophen 325 mg oral tablet: 3 tab(s) orally every 6 hours, As needed, Mild Pain (1 - 3) (27 Apr 2022 10:55)  aspirin 81 mg oral tablet, chewable: 1 tab(s) orally once a day (21 Nov 2021 22:22)  atorvastatin 80 mg oral tablet: 1 tab(s) orally once a day (at bedtime) (16 Jul 2021 11:52)  Melatonin 1 mg oral tablet: 1 tab(s) orally once a day (at bedtime) (21 Nov 2021 22:22)  Nephro-Thomas oral tablet: 1 tab(s) orally once a day (14 Mar 2021 11:10)  NIFEdipine 90 mg oral tablet, extended release: 1 tab(s) orally once a day (16 Jun 2021 12:12)  sevelamer hydrochloride 800 mg oral tablet: 1 tab(s) orally 3 times a day (21 Nov 2021 22:21)  torsemide 20 mg oral tablet: 1 tab(s) orally on non HD days (02 Jun 2021 14:49)      --------------------------------------------------------------------------------------------    PHYSICAL EXAM:   General: NAD, Lying in bed comfortably  Neuro: A+Ox2 (baseline)  HEENT: EOMI, PERRLA, MMM  Cardio: RRR  Resp: Non labored breathing on RA  GI/Abd: Soft, NT/ND, no rebound/guarding, no masses palpated  Vascular: Foot with demarcated toes with dry gangrene, palpable graft along tract with palpable AT. mild slough of distal leg wound  Pelvis: stable  Musculoskeletal: All 4 extremities moving spontaneously, no limitations, no spinal tenderness.  --------------------------------------------------------------------------------------------    LABS                 8.7    6.73   )----------(  215       ( 09 May 2022 04:51 )               28.0      140    |  97     |  74.5   ----------------------------<  124        ( 09 May 2022 04:51 )  4.6     |  25.0   |  5.27     Ca    9.2        ( 09 May 2022 04:51 )    TPro  7.0    /  Alb  3.8    /  TBili  0.6    /  DBili  x      /  AST  27     /  ALT  15     /  AlkPhos  221    ( 09 May 2022 04:51 )    LIVER FUNCTIONS - ( 09 May 2022 04:51 )  Alb: 3.8 g/dL / Pro: 7.0 g/dL / ALK PHOS: 221 U/L / ALT: 15 U/L / AST: 27 U/L / GGT: x           PT/INR -  13.6 sec / 1.17 ratio   ( 09 May 2022 04:51 )       PTT -  41.9 sec   ( 09 May 2022 04:51 )  CAPILLARY BLOOD GLUCOSE                  ASSESSMENT: Patient is a 87y old male with multiple comorbidities, well known to vascular service with gi bleed, history of AVMs which bled previously on NOACs. curently on lovenox for bypass. will coordinate with GI and Vascular Surgery to assess optimal anticoagulation plan    PLAN:    - Will follow up CBCs, will discuss with interested parties AC plan  - recommend podiatry eval to follow foot dry gangrene  - f/u GI (patient follows with Dr. Giron)

## 2022-05-09 NOTE — ED PROVIDER NOTE - CLINICAL SUMMARY MEDICAL DECISION MAKING FREE TEXT BOX
Patient on lovenox with history of GIB 2/2 AVM p/w BRBPR. Plan for labs, supportive care and likely admission for GIB. Will hold off on CTA for now given history of ESRD.

## 2022-05-09 NOTE — ED PROVIDER NOTE - OBJECTIVE STATEMENT
87M h/o GI bleed 2/2 AVMs, ESRD on HD (M/F), CAD, HTN, PPM p/w 2 episodes of BPBPR tonight. No stool, just bright red blood and clots. +weakness. Had an episode of SOB at home, since resolved. +pale skin. Was recently admitted for LLE bypass and restarted on lovenox. Denies CP, fever, syncope, nausea, vomiting, abd pain.    GI: Dr Giron  Renal: Dr Avendano

## 2022-05-09 NOTE — CHART NOTE - NSCHARTNOTEFT_GEN_A_CORE
CTA abdomen shows bleeding at anal canal.  Plan for bedside sigmoidoscopy today after tap water enemas, discussed with Dr. Giron.

## 2022-05-09 NOTE — ED ADULT NURSE NOTE - OBJECTIVE STATEMENT
Patient brought in by daughter for Gl bleed at 6pm clots were found in stool. Family contact GI  and was instructed to come to ED if bleeding did not stop. MD at bedside. Patient placed on cardiac monitor. IV placed, labs sent, plan of care discussed with family. Family verbalized understanding.

## 2022-05-09 NOTE — H&P ADULT - NSHPREVIEWOFSYSTEMS_GEN_ALL_CORE
patient is mildly confused , awake, aware of himself and person but not place. doesnot know year. following simple commands.   no abd pain , n/v as per daughter.    has left  foot all toes dry gangrene daughter states that foot looks better than  prior admission.

## 2022-05-09 NOTE — ED ADULT NURSE REASSESSMENT NOTE - NS ED NURSE REASSESS COMMENT FT1
Pt report given to Cheyenne GARCIA prior to transport to MICU pt taken for CTA on montior and RN at bedside VSS, transported to MICU on monitor and transfusion in progress john garcia

## 2022-05-09 NOTE — CONSULT NOTE ADULT - NS PANP COMMENT GEN_ALL_CORE FT
86 yo male with ESRD on HD, HFpEF, CAD, Afib, HTN, LLE DVT, prior GI bleeding, AS s/p TAVR, PVD s/p recent left anterior tibial bypass in April 2022, discharged on lovenox, now admitted with GI bleeding, acute blood loss anemia.  Plan for urgent CTA abd/pelvis to try to locate lesion, PRBC transfusion.  BP and HR currently stable.  Will observe in MICU as a precaution.    Last dose of lovenox > 24 hours ago, no utility to giving protamine.

## 2022-05-09 NOTE — ED ADULT NURSE REASSESSMENT NOTE - NS ED NURSE REASSESS COMMENT FT1
Code blue called on pt due to AMS s/p large bowel movement w/ bright red blood clot , pt started on 1 unit RBCs, see flowsheet john rangel

## 2022-05-09 NOTE — CONSULT NOTE ADULT - ASSESSMENT
Pt is an 86 y/o M pmhx of ESRD (M/F), HFpEF, HTN, HLD, CAD, A-fib, and LLE DVT, previous GIB on flecainide, AS s/p TAVAR, PAD< RLE fem-pop bypass, w/ revised LLE fem-bypass in march of 2021, complicated by LLE SFA and pop artery occlusion w/ DP and AT runoff s/p L fem to AT bypass w/ cryovein on 4/13/22 presents to [facility] ED for 3-4 BRBPR since last night. Of note pt was d/c'ed from previous hospital admission on Lovenox 1mg/kg/day for previous bypass occlusion.     1. GIB  2. LLE DVT  3. PAD w/ multiple LE bypass  4. ESRD HD M/F    Plan:     Neuro: Awake and alert, avoid neurosedating medications.     CV: HD stable at this time, monitor and will transfuse if MAP falls below 65.     Pulm: Satting well on 3L NC, continue to monitor and supplement as needed.     GI: Diet: NPO in setting of active GIB and possible colonoscopy. Protonix gtt added.     Renal: ESRD on HD M/F. Ordered for CTA, will dialyze post contrast.     Endo: Glucose control <180, K>4, mag>2 for adequate arrythmia suppression.     Heme: Active GIB, Hb, stable at 8.1, transfusing 1unit PRBC now, will repeat CBC to evaluate for appropriate response. Q4 CBC, will transfuse if becomes HD unstable or Hb<7.     ID: No active issues, continue to monitor for signs of underlying infection.     Case discussed w/ attending Dr. Friedman

## 2022-05-09 NOTE — H&P ADULT - NSHPLABSRESULTS_GEN_ALL_CORE
8.1    5.13  )-----------( 197      ( 09 May 2022 09:20 )             26.4   05-09    140  |  97<L>  |  74.5<H>  ----------------------------<  124<H>  4.6   |  25.0  |  5.27<H>    Ca    9.2      09 May 2022 04:51    TPro  7.0  /  Alb  3.8  /  TBili  0.6  /  DBili  x   /  AST  27  /  ALT  15  /  AlkPhos  221<H>  05-09

## 2022-05-10 NOTE — CONSULT NOTE ADULT - SUBJECTIVE AND OBJECTIVE BOX
[facility], [facility]                              [address] [place] [address]                                                 PHONE: [phone]                                                 FAX: [phone]  ------------------------------------------------------------------------------------------------    Chief complaint: anemia, GIB    87y Male known to us from prior admissions presented to [facility] ED for 3-4 BRBPR since last night. Pt  was recently d/c'ed from [facility] on Lovenox 1mg/kg/day for previous bypass occlusion.   At the time of evaluation, pt is in bed. Reports feeling better. Had BM earlier. Underwent HD on Monday and due for Friday.    PAST MEDICAL & SURGICAL HISTORY:  DM (diabetes mellitus)  - resolved      AV block, 1st degree      Arrhythmia      CAD (coronary artery disease)      HTN (hypertension)      VT (ventricular tachycardia)      RICHARDS (dyspnea on exertion)      Anemia      Risk factors for obstructive sleep apnea      ESRD on dialysis  HD on Mondays and Fridays      Aortic stenosis  - s/p valve replacement      GI bleeding  due to ulcerated polyps and colonic AVMs      H/O circumcision  at  age  65      H/O left knee surgery      H/O angioplasty  2013,  no  intervention      A-V fistula  left arm 5/2017      H/O carotid endarterectomy  Right      S/P TAVR (transcatheter aortic valve replacement)      History of loop recorder          Plavix (Hives)  Toprol-XL (Rash)      Review of Systems:  Positive for fatigue  Rest of the systems were reviewed and was negative.     Family history:   No pertinent family history in first degree relative of early CAD, sudden cardiac death or  congenital heart disease    Social History:  No smoking   No alcohol  No other drug use    Vital Signs Last 24 Hrs  T(C): 36.4 (10 May 2022 16:00), Max: 36.8 (10 May 2022 07:29)  T(F): 97.6 (10 May 2022 16:00), Max: 98.2 (10 May 2022 07:29)  HR: 66 (10 May 2022 15:00) (55 - 78)  BP: 125/59 (10 May 2022 15:00) (94/81 - 175/146)  BP(mean): 80 (10 May 2022 15:00) (62 - 154)  RR: 23 (10 May 2022 15:00) (14 - 25)  SpO2: 99% (10 May 2022 15:00) (93% - 100%)    PHYSICAL EXAM:  Constitutional: Appears well; alert and co-operative  HEENT:     Head: Normocephalic and atraumatic  Eyes: Conjunctiva normal, No scleral icterus  Neck: Supple, No JVD  Mouth/Throat: Mucous membranes are normal. Mucosa are not pale or dry.  Cardiovascular: regular S1, S2 + ESM  Respiratory: Lungs clear to auscultation; no crepitations, no wheeze  Gastrointestinal:  Soft, Non-tender, + BS	  Musculoskeletal: Normal range of motion. No edema  Skin: Warm and dry. No cyanosis . No diaphoresis.  Neurologic: Alert oriented to time place and person. Normal strength and no tremor.  Psychiatric: Normal mood and affect, Speech is normal and behavior is normal.        LABS:                        10.1   6.21  )-----------( 252      ( 10 May 2022 15:00 )             32.6     05-10    137  |  97<L>  |  38.9<H>  ----------------------------<  59<L>  3.5   |  23.0  |  3.23<H>    Ca    8.6      10 May 2022 05:33  Phos  3.4     05-10  Mg     1.8     05-10    TPro  5.8<L>  /  Alb  2.9<L>  /  TBili  0.9  /  DBili  x   /  AST  30  /  ALT  17  /  AlkPhos  203<H>  05-10        PT/INR - ( 09 May 2022 13:35 )   PT: 13.3 sec;   INR: 1.14 ratio         PTT - ( 09 May 2022 13:35 )  PTT:42.5 sec    RADIOLOGY & ADDITIONAL STUDIES: (reviewed)  CXR was independently visualized/reviewed and demonstrated:  Increasing infiltrates. No acute bony finding of the left   foot.      CARDIOLOGY TESTING:(reviewed)     ECG (Independent visualization): Paced    ECHOCARDIOGRAM :  Summary:   1. Left ventricular ejection fraction, by visual estimation, is 60 to   65%.   2. Normal global left ventricular systolic function.  3. Moderate to severe left atrial enlargement.   4. Abnormal septal motion consistent with post-operative status.   5. Mildly increased LV wall thickness.   6. Mild to moderately enlarged right atrium.   7. Moderate mitral valve regurgitation.   8. Thickening and calcification of the anterior and posterior mitral   valve leaflets.   9. Transmitral mean gradient is 13.4 mmHg at HR 89 bpm.  10. Mild-moderate tricuspid regurgitation.  11. 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 Gradients suggestive of normally   functioning prosthetic valve. No significant change from prior study   dated 11/2021.  12. Estimated pulmonary artery systolic pressure is 66.4 mmHg assuming a   right atrial pressure of 3 mmHg, which is consistent with severe   pulmonary hypertension.  13. Compared to prior TTE study dated 11/2021, no significant interval   changes have occurred.    MEDICATIONS:(reviewed)  Home Medications:    MEDICATIONS  (STANDING):  atorvastatin 80 milliGRAM(s) Oral at bedtime  cadexomer iodine 0.9% Gel 1 Application(s) Topical two times a day  chlorhexidine 2% Cloths 1 Application(s) Topical <User Schedule>  chlorhexidine 4% Liquid 1 Application(s) Topical <User Schedule>  DULoxetine 30 milliGRAM(s) Oral daily  heparin   Injectable 5000 Unit(s) SubCutaneous every 12 hours  hydrocortisone hemorrhoidal Suppository 1 Suppository(s) Rectal every 12 hours  isosorbide   mononitrate ER Tablet (IMDUR) 30 milliGRAM(s) Oral daily  melatonin 3 milliGRAM(s) Oral at bedtime  multivitamin Oral Tab/Cap - Peds 1 Tablet(s) Oral daily  pantoprazole  Injectable 40 milliGRAM(s) IV Push every 12 hours  polyethylene glycol 3350 17 Gram(s) Oral every 12 hours  sevelamer carbonate 800 milliGRAM(s) Oral three times a day  tamsulosin 0.4 milliGRAM(s) Oral at bedtime  torsemide 20 milliGRAM(s) Oral <User Schedule>  traZODone 50 milliGRAM(s) Oral at bedtime    MEDICATIONS  (PRN):      ASSESSMENT AND PLAN:    87y Male with past medical history significant for ESRD on HD PAF off AC due to GIBs, leadless PPM, ILR in place, non obstructive CAD with normal EF, AS s/p TAVR, ESRD on HD, PAD s/p RLE fem-pop bypass, w/ revised LLE fem-bypass in march of 2021, complicated by LLE SFA and pop artery occlusion w/ DP and AT runoff s/p L fem to AT bypass w/ cryovein on 4/13/22.  Pt  was  d/c'ed  on Lovenox 1mg/kg/day for previous bypass occlusion.       Volume status stable on HD  H/H stable  Will check PPM  Off AC due to recurrent GIB

## 2022-05-10 NOTE — PROGRESS NOTE ADULT - SUBJECTIVE AND OBJECTIVE BOX
Chief Complaint: This is a 87y old man patient being seen in follow-up consultation for rectal bleed.     Interval HPI / 24H events:  Patient seen and evaluated at bedside, sitting in chair, reporting no complaints. CTA abdomen pelvis showed active bleeding at the level of anal canal, now s/p sigmoidoscopy yesterday with no visible source of active bleeding, shows irrigated area at the level of dentate line. . His hemoglobin remain stable, today 9.4 gm. No evidence of overt GI bleed.  Patient denies nausea, vomiting, abdominal pain, chest pain, shortness of breath, hematemesis.      Review of Systems:  · ENMT: negative  · Respiratory and Thorax: negative  · Cardiovascular: negative  · Gastrointestinal: see above.  · Genitourinary:	negative  · Musculoskeletal: negative  · Neurological: negative  · Psychiatric: negative  · Hematology/Lymphatics: negative  · Endocrine: negative      PAST MEDICAL/SURGICAL HISTORY:  DM (diabetes mellitus)  - resolved    AV block, 1st degree    Arrhythmia    CAD (coronary artery disease)    HTN (hypertension)    VT (ventricular tachycardia)    RICHARDS (dyspnea on exertion)    Anemia    Risk factors for obstructive sleep apnea    ESRD on dialysis  HD on Mondays and Fridays    Aortic stenosis  - s/p valve replacement    GI bleeding  due to ulcerated polyps and colonic AVMs    H/O circumcision  at  age  65    H/O left knee surgery    H/O angioplasty  2013,  no  intervention    A-V fistula  left arm 5/2017    H/O carotid endarterectomy  Right    S/P TAVR (transcatheter aortic valve replacement)    History of loop recorder      MEDICATIONS  (STANDING):  atorvastatin 80 milliGRAM(s) Oral at bedtime  cadexomer iodine 0.9% Gel 1 Application(s) Topical two times a day  chlorhexidine 2% Cloths 1 Application(s) Topical <User Schedule>  chlorhexidine 4% Liquid 1 Application(s) Topical <User Schedule>  DULoxetine 30 milliGRAM(s) Oral daily  heparin   Injectable 5000 Unit(s) SubCutaneous every 12 hours  hydrocortisone hemorrhoidal Suppository 1 Suppository(s) Rectal every 12 hours  isosorbide   mononitrate ER Tablet (IMDUR) 30 milliGRAM(s) Oral daily  melatonin 3 milliGRAM(s) Oral at bedtime  multivitamin Oral Tab/Cap - Peds 1 Tablet(s) Oral daily  pantoprazole  Injectable 40 milliGRAM(s) IV Push every 12 hours  polyethylene glycol 3350 17 Gram(s) Oral every 12 hours  sevelamer carbonate 800 milliGRAM(s) Oral three times a day  tamsulosin 0.4 milliGRAM(s) Oral at bedtime  torsemide 20 milliGRAM(s) Oral <User Schedule>  traZODone 50 milliGRAM(s) Oral at bedtime    MEDICATIONS  (PRN):    Plavix (Hives)  Toprol-XL (Rash)    T(C): 36.3 (05-10-22 @ 11:44), Max: 36.8 (05-10-22 @ 07:29)  HR: 55 (05-10-22 @ 12:00) (55 - 78)  BP: 133/60 (05-10-22 @ 12:00) (94/81 - 175/146)  RR: 18 (05-10-22 @ 12:00) (13 - 25)  SpO2: 100% (05-10-22 @ 12:00) (82% - 100%)    I&O's Summary    09 May 2022 07:01  -  10 May 2022 07:00  --------------------------------------------------------  IN: 502 mL / OUT: 500 mL / NET: 2 mL    10 May 2022 07:01  -  10 May 2022 13:07  --------------------------------------------------------  IN: 240 mL / OUT: 0 mL / NET: 240 mL      PHYSICAL EXAM:  Constitutional: Elderly, frail male, in no acute distress  Neuro: Awake alert, oriented  HEENT: PERRL, anicteric sclerae  Neck: supple, no JVD  CV: regular rate, regular rhythm, +S1S2,   Pulm/chest: lung sounds clear bilaterally, no accessory muscle use noted  Abd: soft, NT, ND, +BS. No rigidity, guarding, rebound tenderness    Ext: no Cyanosis, clubbing, trace BLE edema, +LLE dressing dry and intact.   Skin: warm, well perfused, no jaundice   Psych: calm, appropriate affect      LABS:               9.4    4.51  )-----------( 191      ( 05-10 @ 05:33 )             30.0                9.9    4.68  )-----------( 209      ( 05-09 @ 19:50 )             31.4                9.8    5.85  )-----------( 199      ( 05-09 @ 13:33 )             31.4                8.1    5.13  )-----------( 197      ( 05-09 @ 09:20 )             26.4                8.7    6.73  )-----------( 215      ( 05-09 @ 04:51 )             28.0       05-10    137  |  97<L>  |  38.9<H>  ----------------------------<  59<L>  3.5   |  23.0  |  3.23<H>    Ca    8.6      10 May 2022 05:33  Phos  3.4     05-10  Mg     1.8     05-10    TPro  5.8<L>  /  Alb  2.9<L>  /  TBili  0.9  /  DBili  x   /  AST  30  /  ALT  17  /  AlkPhos  203<H>  05-10    LIVER FUNCTIONS - ( 10 May 2022 05:33 )  Alb: 2.9 g/dL / Pro: 5.8 g/dL / ALK PHOS: 203 U/L / ALT: 17 U/L / AST: 30 U/L / GGT: x               PT/INR - ( 09 May 2022 13:35 )   PT: 13.3 sec;   INR: 1.14 ratio         PTT - ( 09 May 2022 13:35 )  PTT:42.5 sec      < from: CT Abdomen and Pelvis w/ IV Cont (05.09.22 @ 10:11) >    FINDINGS:  LOWER CHEST: TAVR, cardiomegaly, coronary artery calcification, wireless   pacemaker. Moderate right and trace left pleural effusion. Dependent lung   atelectasis lower lobes, right more than left. Septal lines and   groundglass opacity suggesting pulmonary edema.    LIVER: Enlarged measuring approximately 19 cm. Heterogeneous delayed   phase enhancement of the predominantly segment 6, indeterminate. Consider   follow-up or MR imaging.  BILE DUCTS: Normal caliber.  GALLBLADDER: Cholelithiasis.  SPLEEN: Within normal limits.  PANCREAS: Within normal limits.  ADRENALS: Within normal limits.  KIDNEYS/URETERS: Cortical thinning and a few probable small cysts   bilaterally. No enhancing mass or hydronephrosis.    BLADDER: Minimally distended.  REPRODUCTIVE ORGANS: Prostate is enlarged.    BOWEL: No bowel obstruction. Appendix is normal. Evidence of nodular and   linear arterial phase enhancement involving the anal canal compatible   with active bleed. No early draining veins are identified, however,   possibilities include angiodysplasia or AVM. Uncomplicated   diverticulosis, predominantly involving the sigmoid colon.  PERITONEUM: Small volume abdominal and pelvic ascites.  VESSELS: Extensive atherosclerotic vascular calcification.  RETROPERITONEUM/LYMPH NODES: No lymphadenopathy.  ABDOMINAL WALL: Anasarca  BONES: Probable hemangioma L1.    IMPRESSION:  Evidence of active bleeding at the level of anal canal as described above.    Atrophic kidneys.  Suspect CHF.    Additional findings as above.    < end of copied text >          < from: Colonoscopy (05.09.22 @ 00:00) >      Findings:     Additional findings Large amount of stool noted in the rectum. The rectosigmoid    area was irrigated with water and careful examination of the mucosa was    performed. There was irrigated area in the anorectal junction. This may be    leading to bleeding. As it was at dentate line, we decided not to inject or clip    the area.       Impressions:      Large amount of stool noted in the rectum. The rectosigmoid area was irrigated    with water and careful examination of the mucosa was performed. There was    irrigated area in the anorectal junction. This may be leading to bleeding. As it    was at dentate line, we decided not to inject or clip the area.         Plan: Clear Liquid Diet     Anusol suppository    Miralax on daily basis    Trial of heparin tomorrow      < end of copied text >

## 2022-05-10 NOTE — CHART NOTE - NSCHARTNOTEFT_GEN_A_CORE
Notified that patient had some melanotic stool earlier and now has had a larger brown/red soft stool. Remains hemodynamically stable.   Discussed with Dr. Contreras, will stop anticoagulation for now.  Just had CBC done and was better than this morning, will repeat CBC at 10pm and with morning labs.  CBC from earlier this afternoon the H/H was stable.  Discussed with Dr. Lamas, ICU Attending

## 2022-05-10 NOTE — PROGRESS NOTE ADULT - SUBJECTIVE AND OBJECTIVE BOX
[facility] DIVISION OF KIDNEY DISEASES AND HYPERTENSION -- FOLLOW UP NOTE  --------------------------------------------------------------------------------  Chief Complaint: esrd    24 hour events/subjective:  s/p HD yesterday- tolerated  0.5 kg UF    PAST HISTORY  --------------------------------------------------------------------------------  No significant changes to PMH, PSH, FHx, SHx, unless otherwise noted    ALLERGIES & MEDICATIONS  --------------------------------------------------------------------------------  Allergies    Plavix (Hives)  Toprol-XL (Rash)    Intolerances      Standing Inpatient Medications  atorvastatin 80 milliGRAM(s) Oral at bedtime  cadexomer iodine 0.9% Gel 1 Application(s) Topical two times a day  chlorhexidine 2% Cloths 1 Application(s) Topical <User Schedule>  chlorhexidine 4% Liquid 1 Application(s) Topical <User Schedule>  DULoxetine 30 milliGRAM(s) Oral daily  heparin   Injectable 5000 Unit(s) SubCutaneous every 12 hours  hydrocortisone hemorrhoidal Suppository 1 Suppository(s) Rectal every 12 hours  isosorbide   mononitrate ER Tablet (IMDUR) 30 milliGRAM(s) Oral daily  melatonin 3 milliGRAM(s) Oral at bedtime  multivitamin Oral Tab/Cap - Peds 1 Tablet(s) Oral daily  pantoprazole  Injectable 40 milliGRAM(s) IV Push every 12 hours  polyethylene glycol 3350 17 Gram(s) Oral every 12 hours  sevelamer carbonate 800 milliGRAM(s) Oral three times a day  tamsulosin 0.4 milliGRAM(s) Oral at bedtime  torsemide 20 milliGRAM(s) Oral <User Schedule>  traZODone 50 milliGRAM(s) Oral at bedtime    PRN Inpatient Medications      REVIEW OF SYSTEMS  --------------------------------------------------------------------------------  Gen: No weight changes, fatigue  Skin: No rashes  Head/Eyes/Ears/Mouth: No headache; Normal hearing; Normal vision w/o blurriness; No sinus pain/discomfort, sore throat  Respiratory: No dyspnea, cough, wheezing, hemoptysis  CV: No chest pain, PND, orthopnea  GI: No abdominal pain, diarrhea, constipation, nausea, vomiting, melena, hematochezia  : No increased frequency, dysuria,  MSK: No joint pain/swelling; leg pain  Neuro: No dizziness/lightheadedness  Heme: No easy bruising or bleeding  Endo: No heat/cold intolerance  Psych: No significant nervousness, anxiety, stress, depression    All other systems were reviewed and are negative, except as noted.    VITALS/PHYSICAL EXAM  --------------------------------------------------------------------------------  T(C): 36.3 (05-10-22 @ 11:44), Max: 36.8 (05-10-22 @ 07:29)  HR: 66 (05-10-22 @ 15:00) (55 - 78)  BP: 125/59 (05-10-22 @ 15:00) (94/81 - 175/146)  RR: 23 (05-10-22 @ 15:00) (14 - 25)  SpO2: 99% (05-10-22 @ 15:00) (82% - 100%)  Wt(kg): --  Height (cm): 165.1 (05-09-22 @ 10:20)  Weight (kg): 66 (05-09-22 @ 10:20)  BMI (kg/m2): 24.2 (05-09-22 @ 10:20)  BSA (m2): 1.73 (05-09-22 @ 10:20)      05-09-22 @ 07:01  -  05-10-22 @ 07:00  --------------------------------------------------------  IN: 502 mL / OUT: 500 mL / NET: 2 mL    05-10-22 @ 07:01  -  05-10-22 @ 15:10  --------------------------------------------------------  IN: 240 mL / OUT: 0 mL / NET: 240 mL      Physical Exam:  	Gen: NAD  	HEENT- Supple neck  	Pulm: CTA B/L  	CV: RRR, S1S2; no rub  	Abd: +BS, soft, nontender/nondistended  	: No suprapubic tenderness  	UE: Warm,  no edema  	LE: Warm,  edema+  	Neuro: Non focal  	Skin: Warm, pallor  	Vascular access: AVF    LABS/STUDIES  --------------------------------------------------------------------------------              9.4    4.51  >-----------<  191      [05-10-22 @ 05:33]              30.0     137  |  97  |  38.9  ----------------------------<  59      [05-10-22 @ 05:33]  3.5   |  23.0  |  3.23        Ca     8.6     [05-10-22 @ 05:33]      Mg     1.8     [05-10-22 @ 05:33]      Phos  3.4     [05-10-22 @ 05:33]    TPro  5.8  /  Alb  2.9  /  TBili  0.9  /  DBili  x   /  AST  30  /  ALT  17  /  AlkPhos  203  [05-10-22 @ 05:33]    PT/INR: PT 13.3 , INR 1.14       [05-09-22 @ 13:35]  PTT: 42.5       [05-09-22 @ 13:35]      Creatinine Trend:  SCr 3.23 [05-10 @ 05:33]  SCr 5.09 [05-09 @ 13:33]  SCr 5.27 [05-09 @ 04:51]  SCr 5.39 [04-25 @ 06:26]  SCr 4.53 [04-24 @ 09:32]        Iron 43, TIBC 249, %sat 17      [07-13-21 @ 16:07]  Ferritin 498      [07-13-21 @ 16:07]  HbA1c 4.9      [08-09-18 @ 05:54]  Lipid: chol 126, , HDL 60, LDL --      [06-03-21 @ 06:07]    HBsAb <3.0      [04-21-22 @ 22:28]  HBsAg Nonreact      [04-21-22 @ 22:28]  HCV 0.17, Nonreact      [04-21-22 @ 22:28]

## 2022-05-10 NOTE — PROGRESS NOTE ADULT - SUBJECTIVE AND OBJECTIVE BOX
INTERVAL HPI/OVERNIGHT EVENTS:    Seen at bedside this morning with ongoing agitation per nursing. Patient without lower extremity pain. Had GI sigmoidoscopy without visible source of bleeding. Hemodynamically stable without need for additional transfusions      MEDICATIONS  (STANDING):  atorvastatin 80 milliGRAM(s) Oral at bedtime  cadexomer iodine 0.9% Gel 1 Application(s) Topical two times a day  chlorhexidine 2% Cloths 1 Application(s) Topical <User Schedule>  heparin   Injectable 5000 Unit(s) SubCutaneous every 12 hours  isosorbide   mononitrate ER Tablet (IMDUR) 30 milliGRAM(s) Oral daily  melatonin 3 milliGRAM(s) Oral at bedtime  multivitamin Oral Tab/Cap - Peds 1 Tablet(s) Oral daily  NIFEdipine XL 90 milliGRAM(s) Oral daily  pantoprazole Infusion 8 mG/Hr (10 mL/Hr) IV Continuous <Continuous>  tamsulosin 0.4 milliGRAM(s) Oral at bedtime  torsemide 20 milliGRAM(s) Oral <User Schedule>    MEDICATIONS  (PRN):      Vital Signs Last 24 Hrs  T(C): 36.6 (09 May 2022 20:55), Max: 36.9 (09 May 2022 07:29)  T(F): 97.8 (09 May 2022 20:55), Max: 98.4 (09 May 2022 07:29)  HR: 73 (10 May 2022 06:00) (65 - 78)  BP: 120/56 (10 May 2022 06:00) (94/81 - 175/146)  BP(mean): 70 (10 May 2022 06:00) (53 - 154)  RR: 16 (10 May 2022 06:00) (13 - 25)  SpO2: 93% (10 May 2022 06:00) (82% - 100%)    Physical Exam:  GEN: NAD, laying in bed, appears stated age  HEENT: NCAT, clear oral mucosa, normal conjunctiva  Chest: non-tender  CV:  non-tachycardic, 2+ radial pulse  Pulm: no increased work of breathing, no conversational dyspnea  GI: soft, non-tender  MSK: moving all extremities   Vasc: Palpable DP pulse 2+ bilaterally. Well healed groin incision. Left leg wounds with dressing in place, dry gangrene of toes. Healing wound of lateral leg      I&O's Detail    09 May 2022 07:01  -  10 May 2022 06:19  --------------------------------------------------------  IN:    Pantoprazole: 200 mL    PRBCs (Packed Red Blood Cells): 302 mL  Total IN: 502 mL    OUT:    Other (mL): 500 mL  Total OUT: 500 mL    Total NET: 2 mL          LABS:                        9.4    4.51  )-----------( 191      ( 10 May 2022 05:33 )             30.0     05-10    137  |  97<L>  |  38.9<H>  ----------------------------<  59<L>  3.5   |  23.0  |  3.23<H>    Ca    8.6      10 May 2022 05:33  Phos  3.4     05-10  Mg     1.8     05-10    TPro  5.8<L>  /  Alb  2.9<L>  /  TBili  0.9  /  DBili  x   /  AST  30  /  ALT  17  /  AlkPhos  203<H>  05-10    PT/INR - ( 09 May 2022 13:35 )   PT: 13.3 sec;   INR: 1.14 ratio         PTT - ( 09 May 2022 13:35 )  PTT:42.5 sec      RADIOLOGY & ADDITIONAL STUDIES:

## 2022-05-10 NOTE — CONSULT NOTE ADULT - SUBJECTIVE AND OBJECTIVE BOX
HPI:  87M h/o GI bleed 2/2 AVMs, ESRD on HD (M/F), CAD, HTN, PPM ,  CHFpEF, arrythmia, HTN, HLD, CAD, a-fib and LLE DVT,  AS s/p TAVR, and PAD (RLE fem-pop bypass, revised LLE fem-pop bypass, March 2021, CFA to AT with cryovein ) , recent hospitalization for left leg pvd , redness , and for left bypass graft  thrombosed. s/p revision of the bypass with thrombectomy. patient was dcd on  Lovenox 1mg/kg/day,  now presents with 3-4 episodes of BPBPR started last night , and was chano to er by daughter.   patient had another large BRBPR in er , RRT was called. sbp in 120s-150s. Micu consulted.      (09 May 2022 09:15)      PERTINENT PMH REVIEWED: Yes No    PAST MEDICAL & SURGICAL HISTORY:  DM (diabetes mellitus)  - resolved    AV block, 1st degree    Arrhythmia    CAD (coronary artery disease)    HTN (hypertension)    VT (ventricular tachycardia)    RICHARDS (dyspnea on exertion)    Anemia    Risk factors for obstructive sleep apnea    ESRD on dialysis  HD on Mondays and Fridays    Aortic stenosis  - s/p valve replacement    GI bleeding  due to ulcerated polyps and colonic AVMs    H/O circumcision  at  age  65    H/O left knee surgery    H/O angioplasty  2013,  no  intervention    A-V fistula  left arm 5/2017    H/O carotid endarterectomy  Right    S/P TAVR (transcatheter aortic valve replacement)    History of loop recorder        SOCIAL HISTORY:                      Substance history:                    Admitted from:  home  [facility]                     Presybeterian/spirituality:                    Cultural concerns:                      Surrogate/HCP/Guardian: Phone#:    FAMILY HISTORY:  Family history of cancer (Grandparent)    Family history of lung cancer (Grandparent)    Family history of premature CAD    FH: type 2 diabetes        Allergies    Plavix (Hives)  Toprol-XL (Rash)    Intolerances        ADVANCE DIRECTIVES/TREATMENT PREFERENCES:  Full code, all aggressive measures desired   DNR/DNI - MOLST, continue all other medical treatments  DNR/DNI - MOLST, comfort measures only     Baseline ADLs (prior to admission):  Independent/ Dependent      Karnofsky/Palliative Performance Status Version 2:  %  http://npcrc.org/files/news/palliative_performance_scale_ppsv2.pdf    Present Symptoms:     Dyspnea: Mild Moderate Severe  Nausea/Vomiting: Yes No  Anxiety:  Yes No  Depression: Yes No  Fatigue: Yes No  Loss of appetite: Yes No  Constipation:     Pain:             Character-            Duration-            Effect-            Factors-            Frequency-            Location-            Severity-    Pain AD Score:  [email or web address]/[email or web address].pdf (press ctrl + left click to view)    Review of Systems: Reviewed                     Negative:                     Positive:  Unable to obtain due to poor mentation   All others negative    MEDICATIONS  (STANDING):  atorvastatin 80 milliGRAM(s) Oral at bedtime  cadexomer iodine 0.9% Gel 1 Application(s) Topical two times a day  chlorhexidine 2% Cloths 1 Application(s) Topical <User Schedule>  chlorhexidine 4% Liquid 1 Application(s) Topical <User Schedule>  DULoxetine 30 milliGRAM(s) Oral daily  heparin   Injectable 5000 Unit(s) SubCutaneous every 12 hours  isosorbide   mononitrate ER Tablet (IMDUR) 30 milliGRAM(s) Oral daily  melatonin 3 milliGRAM(s) Oral at bedtime  multivitamin Oral Tab/Cap - Peds 1 Tablet(s) Oral daily  pantoprazole  Injectable 40 milliGRAM(s) IV Push every 12 hours  sevelamer carbonate 800 milliGRAM(s) Oral three times a day  tamsulosin 0.4 milliGRAM(s) Oral at bedtime  torsemide 20 milliGRAM(s) Oral <User Schedule>  traZODone 50 milliGRAM(s) Oral at bedtime    MEDICATIONS  (PRN):      PHYSICAL EXAM:    Vital Signs Last 24 Hrs  T(C): 36.3 (10 May 2022 11:44), Max: 36.8 (10 May 2022 07:29)  T(F): 97.4 (10 May 2022 11:44), Max: 98.2 (10 May 2022 07:29)  HR: 55 (10 May 2022 12:00) (55 - 78)  BP: 133/60 (10 May 2022 12:00) (94/81 - 175/146)  BP(mean): 72 (10 May 2022 12:00) (53 - 154)  RR: 18 (10 May 2022 12:00) (13 - 25)  SpO2: 100% (10 May 2022 12:00) (82% - 100%)    General: alert  oriented x ____ lethargic agitated delirious                  cachexia  nonverbal  coma    HEENT: normal  dry mouth  ET tube/trach    Lungs: comfortable tachypnea/labored breathing  excessive secretions    CV: normal  tachycardia    GI: normal  distended  tender  no BS               PEG/NG/OG tube  constipation  last BM:     : normal  incontinent  oliguria/anuria  carrera    MSK: normal  weakness  edema             ambulatory  bedbound/wheelchair bound    Neuro: no focal deficits cognitive impairment dysphagia dysarthria hemiplegia     Skin: normal  pressure ulcers- Stage_____  no rash    LABS:                        9.4    4.51  )-----------( 191      ( 10 May 2022 05:33 )             30.0     05-10    137  |  97<L>  |  38.9<H>  ----------------------------<  59<L>  3.5   |  23.0  |  3.23<H>    Ca    8.6      10 May 2022 05:33  Phos  3.4     05-10  Mg     1.8     05-10    TPro  5.8<L>  /  Alb  2.9<L>  /  TBili  0.9  /  DBili  x   /  AST  30  /  ALT  17  /  AlkPhos  203<H>  05-10    PT/INR - ( 09 May 2022 13:35 )   PT: 13.3 sec;   INR: 1.14 ratio         PTT - ( 09 May 2022 13:35 )  PTT:42.5 sec    I&O's Summary    09 May 2022 07:01  -  10 May 2022 07:00  --------------------------------------------------------  IN: 502 mL / OUT: 500 mL / NET: 2 mL    10 May 2022 07:01  -  10 May 2022 12:37  --------------------------------------------------------  IN: 240 mL / OUT: 0 mL / NET: 240 mL        RADIOLOGY & ADDITIONAL STUDIES:       HPI: 87M with PMH as listed admitted 5/9 with bright red blood per rectum. ICU consulted and patient transferred to ICU, s/p urgent CTA with identified bleeding at anal canal, see by gastroenterology s/p sigmoidoscopy with no active bleeding, hemoglobin stable. Patient was recently in hospital for bypass, s/p thrombectomy / angioplasty of graft (4/20) and discharged home on therapeutic lovenox. Palliative consulted due to multiple medical problems and to provided additional support.     PERTINENT PMH REVIEWED: Yes     PAST MEDICAL & SURGICAL HISTORY:  DM (diabetes mellitus) - resolved  AV block, 1st degree  Arrhythmia  CAD (coronary artery disease)  HTN (hypertension)  VT (ventricular tachycardia)  RICHARDS (dyspnea on exertion)  Anemia  Risk factors for obstructive sleep apnea  ESRD on dialysis HD on Mondays and Fridays  Aortic stenosis - s/p valve replacement  GI bleeding due to ulcerated polyps and colonic AVMs  H/O circumcision  at  age  65  H/O left knee surgery  H/O [id number],  no  intervention  A-V fistula left arm 5/2017  H/O carotid endarterectomy Right  S/P TAVR (transcatheter aortic valve replacement)  History of loop recorder    SOCIAL HISTORY:  lives at home with wife                    Substance history: no EtOH                     Admitted from:  home                      Jehovah's witness/spirituality:                    Cultural concerns: none                       Surrogate - daughter Vanessa Douglas  - works here at [employer]  Shameka - wife      FAMILY HISTORY:  Family history of cancer (Grandparent)  Family history of lung cancer (Grandparent)  Family history of premature CAD  FH: type 2 diabetes    Allergies    Plavix (Hives)  Toprol-XL (Rash)    ADVANCE DIRECTIVES/TREATMENT PREFERENCES:  Full code, all aggressive measures desired     Baseline ADLs (prior to admission):  Dependent      Karnofsky/Palliative Performance Status Version 2:  30 %  http://npcrc.org/files/news/palliative_performance_scale_ppsv2.pdf    Present Symptoms:     Dyspnea: none   Nausea/Vomiting: No  Anxiety:  No  Depression: No  Fatigue: Yes   Loss of appetite: No  Constipation: none     Pain: none             Character-            Duration-            Effect-            Factors-            Frequency-            Location-            Severity-    Pain AD Score:  [email or web address]/[email or web address] (press ctrl + left click to view)    Review of Systems: Reviewed                     Negative: no chest pain               All others negative    MEDICATIONS  (STANDING):  atorvastatin 80 milliGRAM(s) Oral at bedtime  cadexomer iodine 0.9% Gel 1 Application(s) Topical two times a day  chlorhexidine 2% Cloths 1 Application(s) Topical <User Schedule>  chlorhexidine 4% Liquid 1 Application(s) Topical <User Schedule>  DULoxetine 30 milliGRAM(s) Oral daily  heparin   Injectable 5000 Unit(s) SubCutaneous every 12 hours  isosorbide   mononitrate ER Tablet (IMDUR) 30 milliGRAM(s) Oral daily  melatonin 3 milliGRAM(s) Oral at bedtime  multivitamin Oral Tab/Cap - Peds 1 Tablet(s) Oral daily  pantoprazole  Injectable 40 milliGRAM(s) IV Push every 12 hours  sevelamer carbonate 800 milliGRAM(s) Oral three times a day  tamsulosin 0.4 milliGRAM(s) Oral at bedtime  torsemide 20 milliGRAM(s) Oral <User Schedule>  traZODone 50 milliGRAM(s) Oral at bedtime    MEDICATIONS  (PRN):    PHYSICAL EXAM:    Vital Signs Last 24 Hrs  T(C): 36.3 (10 May 2022 11:44), Max: 36.8 (10 May 2022 07:29)  T(F): 97.4 (10 May 2022 11:44), Max: 98.2 (10 May 2022 07:29)  HR: 55 (10 May 2022 12:00) (55 - 78)  BP: 133/60 (10 May 2022 12:00) (94/81 - 175/146)  BP(mean): 72 (10 May 2022 12:00) (53 - 154)  RR: 18 (10 May 2022 12:00) (13 - 25)  SpO2: 100% (10 May 2022 12:00) (82% - 100%)    General: alert, but nods off to sleep during conversation, cachexia, sitting in bedside chair       HEENT: normal      Lungs: comfortable    CV: normal      GI: normal     : carrera    MSK: weakness    Neuro: no focal deficits     Skin: no rash    LABS:                     9.4    4.51  )-----------( 191      ( 10 May 2022 05:33 )             30.0     05-10    137  |  97<L>  |  38.9<H>  ----------------------------<  59<L>  3.5   |  23.0  |  3.23<H>    Ca    8.6      10 May 2022 05:33  Phos  3.4     05-10  Mg     1.8     05-10    TPro  5.8<L>  /  Alb  2.9<L>  /  TBili  0.9  /  DBili  x   /  AST  30  /  ALT  17  /  AlkPhos  203<H>  05-10    PT/INR - ( 09 May 2022 13:35 )   PT: 13.3 sec;   INR: 1.14 ratio         PTT - ( 09 May 2022 13:35 )  PTT:42.5 sec    I&O's Summary    09 May 2022 07:01  -  10 May 2022 07:00  --------------------------------------------------------  IN: 502 mL / OUT: 500 mL / NET: 2 mL    10 May 2022 07:01  -  10 May 2022 12:37  --------------------------------------------------------  IN: 240 mL / OUT: 0 mL / NET: 240 mL    RADIOLOGY & ADDITIONAL STUDIES:    < from: CT Abdomen and Pelvis w/ IV Cont (05.09.22 @ 10:11) >  INTERPRETATION:  CLINICAL INFORMATION: Rectal bleeding.    COMPARISON: CT abdomen and pelvis dated 07/13/2021.    CONTRAST/COMPLICATIONS:  IV Contrast: Omnipaque 350  100 cc administered   0 cc discarded  Oral Contrast: NONE  Complications: None reported at time of study completion    PROCEDURE:  CT of the Abdomen and Pelvis was performed.  Precontrast, Arterial and Delayed phases were performed.  Sagittal and coronal reformats were performed.    FINDINGS:  LOWER CHEST: TAVR, cardiomegaly, coronary artery calcification, wireless   pacemaker. Moderate right and trace left pleural effusion. Dependent lung   atelectasis lower lobes, right more than left. Septal lines and   groundglass opacity suggesting pulmonary edema.    LIVER: Enlarged measuring approximately 19 cm. Heterogeneous delayed   phase enhancement of the predominantly segment 6, indeterminate. Consider   follow-up or MR imaging.  BILE DUCTS: Normal caliber.  GALLBLADDER: Cholelithiasis.  SPLEEN: Within normal limits.  PANCREAS: Within normal limits.  ADRENALS: Within normal limits.  KIDNEYS/URETERS: Cortical thinning and a few probable small cysts   bilaterally. No enhancing mass or hydronephrosis.    BLADDER: Minimally distended.  REPRODUCTIVE ORGANS: Prostate is enlarged.    BOWEL: No bowel obstruction. Appendix is normal. Evidence of nodular and   linear arterial phase enhancement involving the anal canal compatible   with active bleed. No early draining veins are identified, however,   possibilities include angiodysplasia or AVM. Uncomplicated   diverticulosis, predominantly involving the sigmoid colon.  PERITONEUM: Small volume abdominal and pelvic ascites.  VESSELS: Extensive atherosclerotic vascular calcification.  RETROPERITONEUM/LYMPH NODES: No lymphadenopathy.  ABDOMINAL WALL: Anasarca  BONES: Probable hemangioma L1.    IMPRESSION:  Evidence of active bleeding at the level of anal canal as described above.    Atrophic kidneys.  Suspect CHF.    Additional findings as above.    Findings were discussed with DR. DOWNING [phone] 5/9/2022 11:55 AM by   Dr. Garner with read back confirmation.    --- End of Report ---    < end of copied text >

## 2022-05-10 NOTE — PROGRESS NOTE ADULT - SUBJECTIVE AND OBJECTIVE BOX
[facility]    [facility]    Patient is a 87y old  Male who presents with a chief complaint of gib (10 May 2022 12:37)      BRIEF HOSPITAL COURSE: **87M h/o GI bleed 2/2 AVMs, ESRD on HD (M/F), CAD, HTN, PPM ,  CHFpEF, arrythmia, HTN, HLD, CAD, a-fib and LLE DVT,  AS s/p TAVR, and PAD (RLE fem-pop bypass, revised LLE fem-pop bypass, March 2021, CFA to AT with cryovein ) , recent hospitalization for left leg pvd , redness , and for left bypass graft  thrombosed. s/p revision of the bypass with thrombectomy. patient was dcd on  Lovenox 1mg/kg/day,  now presents with 3-4 episodes of BPBPR started last night , and was brought  to er by daughter.   patient had another large BRBPR in er , RRT was called. sbp in 120s-150s. Micu consulted.     *    Events last 24 hours: *    no further bleeding   case  discussed with Dr Downing  no   lovenox until am     patient alert conversant no  distress    REVIEW OF SYSTEMS     CONSTITUTIONAL   no fevers  no loss of appetite  no weight loss   HEENT  no sore throat   no ringing in ears  NECK   no pain   RESPIRATORY  see HPI   CARDIOVASCULAR  no chest  pain no palpitations   GASTROINTESTINAL no vomiting  no diarrhea    no   gerd   MUSCULOSKELETAL  no joint pains    no  back pain   SKIN   no rash   no itchiness   GENITOURINARY  no dysuria   HEME    no bleeding or bruising   ENDOCRINE     no   warmth   no  sweating   no  cold intolerance   NEUROLOGIC  no tremors  no seizures  no    weakness    PSYCHIATRIC   no mood disorder    no delirium   **    PAST MEDICAL & SURGICAL HISTORY:  DM (diabetes mellitus)  - resolved    AV block, 1st degree    Arrhythmia    CAD (coronary artery disease)    HTN (hypertension)    VT (ventricular tachycardia)    RICHARDS (dyspnea on exertion)    Anemia    Risk factors for obstructive sleep apnea    ESRD on dialysis  HD on Mondays and Fridays    Aortic stenosis  - s/p valve replacement    GI bleeding  due to ulcerated polyps and colonic AVMs    H/O circumcision  at  age  65    H/O left knee surgery    H/O angioplasty  2013,  no  intervention    A-V fistula  left arm 5/2017    H/O carotid endarterectomy  Right    S/P TAVR (transcatheter aortic valve replacement)    History of loop recorder          Medications:    isosorbide   mononitrate ER Tablet (IMDUR) 30 milliGRAM(s) Oral daily  tamsulosin 0.4 milliGRAM(s) Oral at bedtime  torsemide 20 milliGRAM(s) Oral <User Schedule>      DULoxetine 30 milliGRAM(s) Oral daily  melatonin 3 milliGRAM(s) Oral at bedtime  traZODone 50 milliGRAM(s) Oral at bedtime      enoxaparin Injectable 60 milliGRAM(s) SubCutaneous every 12 hours    pantoprazole  Injectable 40 milliGRAM(s) IV Push every 12 hours      atorvastatin 80 milliGRAM(s) Oral at bedtime    multivitamin Oral Tab/Cap - Peds 1 Tablet(s) Oral daily      cadexomer iodine 0.9% Gel 1 Application(s) Topical two times a day  chlorhexidine 2% Cloths 1 Application(s) Topical <User Schedule>  chlorhexidine 4% Liquid 1 Application(s) Topical <User Schedule>    sevelamer carbonate 800 milliGRAM(s) Oral three times a day          ICU Vital Signs Last 24 Hrs  T(C): 36.3 (10 May 2022 11:44), Max: 36.8 (10 May 2022 07:29)  T(F): 97.4 (10 May 2022 11:44), Max: 98.2 (10 May 2022 07:29)  HR: 55 (10 May 2022 12:00) (55 - 78)  BP: 133/60 (10 May 2022 12:00) (94/81 - 175/146)  BP(mean): 72 (10 May 2022 12:00) (53 - 154)  ABP: --  ABP(mean): --  RR: 18 (10 May 2022 12:00) (13 - 25)  SpO2: 100% (10 May 2022 12:00) (82% - 100%)          I&O's Detail    09 May 2022 07:01  -  10 May 2022 07:00  --------------------------------------------------------  IN:    Pantoprazole: 200 mL    PRBCs (Packed Red Blood Cells): 302 mL  Total IN: 502 mL    OUT:    Other (mL): 500 mL  Total OUT: 500 mL    Total NET: 2 mL      10 May 2022 07:01  -  10 May 2022 12:45  --------------------------------------------------------  IN:    Oral Fluid: 240 mL  Total IN: 240 mL    OUT:  Total OUT: 0 mL    Total NET: 240 mL            LABS:                        9.4    4.51  )-----------( 191      ( 10 May 2022 05:33 )             30.0     05-10    137  |  97<L>  |  38.9<H>  ----------------------------<  59<L>  3.5   |  23.0  |  3.23<H>    Ca    8.6      10 May 2022 05:33  Phos  3.4     05-10  Mg     1.8     05-10    TPro  5.8<L>  /  Alb  2.9<L>  /  TBili  0.9  /  DBili  x   /  AST  30  /  ALT  17  /  AlkPhos  203<H>  05-10          CAPILLARY BLOOD GLUCOSE      POCT Blood Glucose.: 112 mg/dL (09 May 2022 09:11)    PT/INR - ( 09 May 2022 13:35 )   PT: 13.3 sec;   INR: 1.14 ratio         PTT - ( 09 May 2022 13:35 )  PTT:42.5 sec    CULTURES:      Physical Examination:    General: No acute distress.   speaking full sentences     HEENT: Pupils equal, reactive to light.  Symmetric.   no lesions     PULM: Clear to auscultation bilaterally, no significant sputum production no w    NECK: Supple, no lymphadenopathy, trachea midline    CVS: Regular rate and rhythm, no murmurs, rubs, or gallops    ABD: Soft, nondistended, nontender, normoactive bowel sounds, no masses    EXT:     left   gangrene     SKIN: Warm and well perfused, no rashes noted.    NEURO: Alert, oriented, interactive, nonfocal    DEVICES:     RADIOLOGY: IMPRESSION:  Evidence of active bleeding at the level of anal canal as described above.    Atrophic kidneys.  Suspect CHF.    Additional findings as above.    Findings were discussed with DR. DOWNING [phone] 5/9/2022 11:55 AM by   Dr. Hunt with read back confirmation.    --- End of Report ---            DAVID HUNT MD; Attending Radiologist  This document has been electronically signed. May  9 2022 11:57AM        IMPRESSION:  No evidence of left upper extremity deep venous thrombosis.        --- End of Report ---            ONESIMO COX MD; Attending Radiologist  This document has been electronically signed. May 10 2022  6:02AM  **  ECHO: < from: TTE Echo Complete w/o Contrast w/ Doppler (11.23.21 @ 17:39) >  Summary:   1. Left ventricular ejection fraction, by visual estimation, is 60 to 65%.   2. Normal global left ventricular systolic function.   3. Spectral Doppler shows pseudonormal pattern of left ventricular myocardial filling (Grade II diastolic dysfunction). Elevated mean left atrial pressure.   4. Normal left ventricular internal cavity size.   5. Moderate to severe left atrial enlargement.   6. There is mild concentric left ventricular hypertrophy.   7. Mildly enlarged right ventricle. RV systolic function is normal.   8. Moderately enlarged right atrium.   9. Moderate to severe mitral annular calcification.  10. Moderate thickening and calcification of the posterior mitral valve leaflet.  11. Mitral valve mean gradient is 12.0 mmHg consistent with moderate mitral stenosis.  12. Moderate mitral valve regurgitation.  13. Patient is s/p TAVR which appears to be functioning normally.  14. Moderate tricuspid regurgitation.  15. Mild pulmonic valve regurgitation.  16. Estimated pulmonary artery systolic pressure is 101.1 mmHg assuming a right atrial pressure of 15 mmHg, which is consistent with severe pulmonary hypertension.  17. There is no evidence of pericardial effusion.    MD Tano Electronically signed on 11/24/2021 at 9:44:43 AM          IMPRESSION:  Evidence of active bleeding at the level of anal canal as described above.    Atrophic kidneys.  Suspect CHF.    Additional findings as above.    Findings were discussed with DR. DOWNING [phone] 5/9/2022 11:55 AM by Dr. Hunt with read back confirmation.    --- End of Report ---            DAVID HUNT MD; Attending Radiologist  This document has been electronically signed. May 9 2022 11:57AM    CRITICAL CARE TIME SPENT: ***

## 2022-05-10 NOTE — CONSULT NOTE ADULT - ASSESSMENT
87M with ESRD on HD, anemia, CHF with preserved EF, HTN, CAD, AFIB, PVD s/p recent bypass, LLE DVT, admitted with gastrointestinal bleeding.     #1 Gastrointestinal bleeding  - s/p CTA with bleeding noted in anal canal  - s/p sigmoidoscopy with no active bleeding  - hemoglobin stable  - gi following, transfuse if necessary, anusol for internal hemorrhoids  - monitor for signs of bleeding  - hemodynamically stable    #2 PVD  - s/p multiple LE bypasses  - recent bypass 4/20  - will need therapeutic Lovenox when able per medical team    #3 DVT, left femoral and popliteal   - needs to be back on therapeutic AC when cleared by medicine   - on ppx dose heparin currently    #4 ESRD  - HD per nephrology    #5 Encounter for palliative care  - spoke with daughter Vanessa, all measures desired at this time, alot of emotional support provided. They finally got some help at home with home health aid and that has been very helpful for her.

## 2022-05-10 NOTE — PROGRESS NOTE ADULT - ASSESSMENT
ASSESSMENT: Patient is a 87y old male with multiple comorbidities, well known to vascular service with gi bleed, history of AVMs which bled previously on NOACs. curently on lovenox (held) for bypass with GI bleed.     PLAN:    - s/p GI endoscopy without visualization of source of bleed. Per GI Trial of anticoagulation  - recommend podiatry eval to follow foot dry gangrene  - Will need therapeutic lovenox on discharge.

## 2022-05-10 NOTE — PROGRESS NOTE ADULT - NS ATTEND AMEND GEN_ALL_CORE FT
Patient seen and examined with NP.   NO rectal bleeding as per ICU bedside nurse  hemoglobin stable  Flex sig with proctitis- Will give anusol HC suppository q hs  starting SQ  heparin this evening Patient seen and examined with NP.   NO rectal bleeding as per ICU bedside nurse  hemoglobin stable  Flex sig with proctitis- Will give anusol HC suppository q hs  starting SQ  heparin this evening       Addendum-  I was called by the MICU PA- pt had brown stool with moderate amount of blood ( verified by bedside nurse and duaghter at bedside ). Pt had first dose of rectal suppository about 2 hours ago. . Rectal showed brown liquid stool with small amount of blood    -  Please hold heparin for now.     - increase hydrocortisone suppositories to bid

## 2022-05-10 NOTE — CONSULT NOTE ADULT - SUBJECTIVE AND OBJECTIVE BOX
Patient is a 87y old  Male who presents with a chief complaint of gib (10 May 2022 13:07)      HPI:  87M h/o GI bleed 2/2 AVMs, ESRD on HD (M/F), CAD, HTN, PPM ,  CHFpEF, arrythmia, HTN, HLD, CAD, a-fib and LLE DVT,  AS s/p TAVR, and PAD (RLE fem-pop bypass, revised LLE fem-pop bypass, March 2021, CFA to AT with cryovein ) , recent hospitalization for left leg pvd , redness , and for left bypass graft  thrombosed. s/p revision of the bypass with thrombectomy. patient was dcd on  Lovenox 1mg/kg/day,  now presents with 3-4 episodes of BPBPR started last night , and was chano to er by daughter.   patient had another large BRBPR in er , RRT was called. sbp in 120s-150s. Micu consulted.      (09 May 2022 09:15)      Podiatry HPI: Hx as noted above, podiatry consulted for L dry gangrene to digits. Patient evaluated at bedside, denies pain to L foot. Notes he has had dry gangrene for a long time. States he has no issues with his R foot. Pt followed by vascular, hx of multiple interventions.  Dwain F/C/N/V/SOB.     PMH:DM (diabetes mellitus)    AV block, 1st degree    Arrhythmia    CKD (chronic kidney disease)    CAD (coronary artery disease)    HTN (hypertension)    VT (ventricular tachycardia)    RICHARDS (dyspnea on exertion)    Anemia    Risk factors for obstructive sleep apnea    ESRD on dialysis    Aortic stenosis    GI bleeding      Allergies: Plavix (Hives)  Toprol-XL (Rash)    Medications: atorvastatin 80 milliGRAM(s) Oral at bedtime  cadexomer iodine 0.9% Gel 1 Application(s) Topical two times a day  chlorhexidine 2% Cloths 1 Application(s) Topical <User Schedule>  chlorhexidine 4% Liquid 1 Application(s) Topical <User Schedule>  DULoxetine 30 milliGRAM(s) Oral daily  heparin   Injectable 5000 Unit(s) SubCutaneous every 12 hours  hydrocortisone hemorrhoidal Suppository 1 Suppository(s) Rectal every 12 hours  isosorbide   mononitrate ER Tablet (IMDUR) 30 milliGRAM(s) Oral daily  melatonin 3 milliGRAM(s) Oral at bedtime  multivitamin Oral Tab/Cap - Peds 1 Tablet(s) Oral daily  pantoprazole  Injectable 40 milliGRAM(s) IV Push every 12 hours  polyethylene glycol 3350 17 Gram(s) Oral every 12 hours  sevelamer carbonate 800 milliGRAM(s) Oral three times a day  tamsulosin 0.4 milliGRAM(s) Oral at bedtime  torsemide 20 milliGRAM(s) Oral <User Schedule>  traZODone 50 milliGRAM(s) Oral at bedtime    FH:No pertinent family history in first degree relatives    Family history of cancer (Grandparent)    Family history of lung cancer (Grandparent)    Family history of premature CAD    FH: type 2 diabetes      PSX: H/O circumcision    H/O left knee surgery    H/O angioplasty    A-V fistula    H/O carotid endarterectomy    S/P TAVR (transcatheter aortic valve replacement)    History of loop recorder      SH: atorvastatin 80 milliGRAM(s) Oral at bedtime  cadexomer iodine 0.9% Gel 1 Application(s) Topical two times a day  chlorhexidine 2% Cloths 1 Application(s) Topical <User Schedule>  chlorhexidine 4% Liquid 1 Application(s) Topical <User Schedule>  DULoxetine 30 milliGRAM(s) Oral daily  heparin   Injectable 5000 Unit(s) SubCutaneous every 12 hours  hydrocortisone hemorrhoidal Suppository 1 Suppository(s) Rectal every 12 hours  isosorbide   mononitrate ER Tablet (IMDUR) 30 milliGRAM(s) Oral daily  melatonin 3 milliGRAM(s) Oral at bedtime  multivitamin Oral Tab/Cap - Peds 1 Tablet(s) Oral daily  pantoprazole  Injectable 40 milliGRAM(s) IV Push every 12 hours  polyethylene glycol 3350 17 Gram(s) Oral every 12 hours  sevelamer carbonate 800 milliGRAM(s) Oral three times a day  tamsulosin 0.4 milliGRAM(s) Oral at bedtime  torsemide 20 milliGRAM(s) Oral <User Schedule>  traZODone 50 milliGRAM(s) Oral at bedtime      Vital Signs Last 24 Hrs  T(C): 36.3 (10 May 2022 11:44), Max: 36.8 (10 May 2022 07:29)  T(F): 97.4 (10 May 2022 11:44), Max: 98.2 (10 May 2022 07:29)  HR: 55 (10 May 2022 14:00) (55 - 78)  BP: 134/43 (10 May 2022 14:00) (94/81 - 175/146)  BP(mean): 62 (10 May 2022 14:00) (62 - 154)  RR: 16 (10 May 2022 14:00) (13 - 25)  SpO2: 100% (10 May 2022 14:00) (82% - 100%)    LABS                        9.4    4.51  )-----------( 191      ( 10 May 2022 05:33 )             30.0               05-10    137  |  97<L>  |  38.9<H>  ----------------------------<  59<L>  3.5   |  23.0  |  3.23<H>    Ca    8.6      10 May 2022 05:33  Phos  3.4     05-10  Mg     1.8     05-10    TPro  5.8<L>  /  Alb  2.9<L>  /  TBili  0.9  /  DBili  x   /  AST  30  /  ALT  17  /  AlkPhos  203<H>  05-10      ROS  REVIEW OF SYSTEMS: All other ROS negative except as noted in HPI       PHYSICAL EXAM  LE Focused:    Vasc:  DP//PT faintly palpable b/l, CFT absent to L foot digits; TG warm to cool b/l, mild edema to L foot   Derm: dry gangrene noted to 1-5 L digits, no open lesions, no clinical signs of infection;    Neuro: protective sensation grossly diminished at level of digit  MSK: muscle strength diminished, no tenderness on palpation to L foot       IMAGING:   pending official read         A:  L foot dry gangrene 1-5 digits     P:   Patient evaluated and Chart reviewed   Discussed diagnosis and treatment with patient  Pending L foot xray official read, unremarkable findings   Applied iodosorb to L digits   continue monitoring demarcation of digits  No surgical intervention from podiatric standpoint  c/w local wound care using iodosorb MWF   c/w vascular recommendations   Weight bearing/Non-weight bearing  to ------------  Offloading to bilateral Heels.   WCO in place   Seen and evaluated with attending Dr. Guerrero        Patient is a 87y old  Male who presents with a chief complaint of gib (10 May 2022 13:07)      HPI:  87M h/o GI bleed 2/2 AVMs, ESRD on HD (M/F), CAD, HTN, PPM ,  CHFpEF, arrythmia, HTN, HLD, CAD, a-fib and LLE DVT,  AS s/p TAVR, and PAD (RLE fem-pop bypass, revised LLE fem-pop bypass, March 2021, CFA to AT with cryovein ) , recent hospitalization for left leg pvd , redness , and for left bypass graft  thrombosed. s/p revision of the bypass with thrombectomy. patient was dcd on  Lovenox 1mg/kg/day,  now presents with 3-4 episodes of BPBPR started last night , and was chano to er by daughter.   patient had another large BRBPR in er , RRT was called. sbp in 120s-150s. Micu consulted.      (09 May 2022 09:15)      Podiatry HPI: Hx as noted above, podiatry consulted for L dry gangrene to digits. Patient evaluated at bedside, denies pain to L foot. Notes he has had dry gangrene for a long time. States he has no issues with his R foot. Pt followed by vascular, hx of multiple interventions.  Dwain F/C/N/V/SOB.     PMH:DM (diabetes mellitus)    AV block, 1st degree    Arrhythmia    CKD (chronic kidney disease)    CAD (coronary artery disease)    HTN (hypertension)    VT (ventricular tachycardia)    RICHARDS (dyspnea on exertion)    Anemia    Risk factors for obstructive sleep apnea    ESRD on dialysis    Aortic stenosis    GI bleeding      Allergies: Plavix (Hives)  Toprol-XL (Rash)    Medications: atorvastatin 80 milliGRAM(s) Oral at bedtime  cadexomer iodine 0.9% Gel 1 Application(s) Topical two times a day  chlorhexidine 2% Cloths 1 Application(s) Topical <User Schedule>  chlorhexidine 4% Liquid 1 Application(s) Topical <User Schedule>  DULoxetine 30 milliGRAM(s) Oral daily  heparin   Injectable 5000 Unit(s) SubCutaneous every 12 hours  hydrocortisone hemorrhoidal Suppository 1 Suppository(s) Rectal every 12 hours  isosorbide   mononitrate ER Tablet (IMDUR) 30 milliGRAM(s) Oral daily  melatonin 3 milliGRAM(s) Oral at bedtime  multivitamin Oral Tab/Cap - Peds 1 Tablet(s) Oral daily  pantoprazole  Injectable 40 milliGRAM(s) IV Push every 12 hours  polyethylene glycol 3350 17 Gram(s) Oral every 12 hours  sevelamer carbonate 800 milliGRAM(s) Oral three times a day  tamsulosin 0.4 milliGRAM(s) Oral at bedtime  torsemide 20 milliGRAM(s) Oral <User Schedule>  traZODone 50 milliGRAM(s) Oral at bedtime    FH:No pertinent family history in first degree relatives    Family history of cancer (Grandparent)    Family history of lung cancer (Grandparent)    Family history of premature CAD    FH: type 2 diabetes      PSX: H/O circumcision    H/O left knee surgery    H/O angioplasty    A-V fistula    H/O carotid endarterectomy    S/P TAVR (transcatheter aortic valve replacement)    History of loop recorder      SH: atorvastatin 80 milliGRAM(s) Oral at bedtime  cadexomer iodine 0.9% Gel 1 Application(s) Topical two times a day  chlorhexidine 2% Cloths 1 Application(s) Topical <User Schedule>  chlorhexidine 4% Liquid 1 Application(s) Topical <User Schedule>  DULoxetine 30 milliGRAM(s) Oral daily  heparin   Injectable 5000 Unit(s) SubCutaneous every 12 hours  hydrocortisone hemorrhoidal Suppository 1 Suppository(s) Rectal every 12 hours  isosorbide   mononitrate ER Tablet (IMDUR) 30 milliGRAM(s) Oral daily  melatonin 3 milliGRAM(s) Oral at bedtime  multivitamin Oral Tab/Cap - Peds 1 Tablet(s) Oral daily  pantoprazole  Injectable 40 milliGRAM(s) IV Push every 12 hours  polyethylene glycol 3350 17 Gram(s) Oral every 12 hours  sevelamer carbonate 800 milliGRAM(s) Oral three times a day  tamsulosin 0.4 milliGRAM(s) Oral at bedtime  torsemide 20 milliGRAM(s) Oral <User Schedule>  traZODone 50 milliGRAM(s) Oral at bedtime      Vital Signs Last 24 Hrs  T(C): 36.3 (10 May 2022 11:44), Max: 36.8 (10 May 2022 07:29)  T(F): 97.4 (10 May 2022 11:44), Max: 98.2 (10 May 2022 07:29)  HR: 55 (10 May 2022 14:00) (55 - 78)  BP: 134/43 (10 May 2022 14:00) (94/81 - 175/146)  BP(mean): 62 (10 May 2022 14:00) (62 - 154)  RR: 16 (10 May 2022 14:00) (13 - 25)  SpO2: 100% (10 May 2022 14:00) (82% - 100%)    LABS                        9.4    4.51  )-----------( 191      ( 10 May 2022 05:33 )             30.0               05-10    137  |  97<L>  |  38.9<H>  ----------------------------<  59<L>  3.5   |  23.0  |  3.23<H>    Ca    8.6      10 May 2022 05:33  Phos  3.4     05-10  Mg     1.8     05-10    TPro  5.8<L>  /  Alb  2.9<L>  /  TBili  0.9  /  DBili  x   /  AST  30  /  ALT  17  /  AlkPhos  203<H>  05-10      ROS  REVIEW OF SYSTEMS: All other ROS negative except as noted in HPI       PHYSICAL EXAM  LE Focused:    Vasc:  DP//PT faintly palpable b/l, CFT absent to L foot digits; TG warm to cool b/l, mild edema to L foot   Derm: dry gangrene noted to 1-5 L digits, no open lesions, no clinical signs of infection;    Neuro: protective sensation grossly diminished at level of digit  MSK: muscle strength diminished, no tenderness on palpation to L foot       IMAGING:   pending official read         A:  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   WCO in place   Seen and evaluated with attending Dr. Guerrero         Patient was examined.  All documentation reviewed.  Discussed pathology and treatment plan with resident.  Reviewed written documentation and agreed with the above.  Patient will be followed while in house

## 2022-05-10 NOTE — PROGRESS NOTE ADULT - ASSESSMENT
1) ESRD on HD  2) Anemia of chronic disease with superimposed LGIB  3) PVD s/p recent left anterior tibial bypass in April 2022- on therapeutic Lovenox outpt for previous bypass occlusion     CTA-abd:   anal canal enhancement/  active bleed   Flex sig with proctitis as per GI  s/p 1 U PRBC, Hb remains stable- no further episodes of bleeding reported  Plan to start SC heparin tonight  Monitor H/H  Transfuse prn to keep Hb > 8  Continue AMY  HD M-F schedule

## 2022-05-10 NOTE — PROGRESS NOTE ADULT - ASSESSMENT
87M h/o GI bleed 2/2 AVMs, ESRD on HD (M/F), CAD, HTN, PPM ,  CHFpEF, arrythmia, HTN, HLD, CAD, a-fib and LLE DVT,  AS s/p TAVR, and PAD (RLE fem-pop bypass, revised LLE fem-pop bypass, March 2021, CFA to AT with cryovein ) , recent hospitalization for left leg pvd , redness , and for left bypass graft  thrombosed. s/p revision of the bypass with thrombectomy. patient was dcd   4/27/2022     on  Lovenox 1mg/kg/day,  now presents with 3-4 episodes of BPBPR started last night , and was brought to er by daughter.   patient had another large BRBPR in er , RRT was called. sbp in 120s-150s. Micu consulted.       1-  GIB s/p one unit prbc   CTA-abd:   anal canal enhancement  - active bleed -    colonoscopy   proctitis   2-  PVD  s/p   left LE  bypass  revision 4/13/2022  complicated by 4/14/22  clot removed  ( initial 2021)  was on full a/c  3- cad/   wireless pacer/    afib /   s/p  TAVR  /  severe  pulm htn /  Moderate MR / Pacer 4/2022  VDD (10/2020) RVpaced   4-  duplex   chronic  Left femoral  dvt  5-  ESRD on HD     Mon Frid  6- MRI  bilateral  frontal  subdural collections  suggesting  hematomas vs  hydromas       neurologic    alert and conversant   monitor mental status    resp    cxr AP    sats > 90 %  supplemental nasal canula     ID     sed  rate  ID input  ?  ischemic  leg     GI       PPI  proctitis   start anusol   supp  bid    miralax     cvs/ PVD/ HEME    cardiology for pacer check  now paced   case discussed with GI -   heparin  sq   x 24 hours then   full a/c ,  podiatry called /   demarcation of  foot     renal    on HD     follow up renal     misc   patient is high risk of  cardiac events    and deterioration    - will consult  palliative care   as  goals of care  need to be clarified -   prognosis is poor and guarded  -        Critical  care TIme  > 35  minutes were spent assessing the patient's presenting problems of acute illness that pose a high probability   of life threatening  deterioration or end organ damage / dysfunction.  Medical desicion making includes initiation/ continuation of plan or care review data/labwork/  radiographic study,  direct patient  care bedside ,  discussions with  consultants regarding care,     evaluation  and  interpretation of vital signs,  any necessary ventilator management,   NIV or BIPAP changes  or initiation,    discussions with multidisciplinary team,  am or pm rounds,  discussions of goals of care with patient and family  all non-inclusive of procedures.

## 2022-05-11 NOTE — DIETITIAN INITIAL EVALUATION ADULT - NS FNS DIET ORDER
Diet, Renal Restrictions:   For patients receiving Renal Replacement - No Protein Restr, No Conc K, No Conc Phos, Low Sodium  Supplement Feeding Modality:  Oral  Nepro Cans or Servings Per Day:  1       Frequency:  Daily (05-10-22 @ 12:30)

## 2022-05-11 NOTE — DIETITIAN INITIAL EVALUATION ADULT - OTHER INFO
87M h/o GI bleed 2/2 AVMs, ESRD on HD (M/F), CAD, HTN, PPM ,  CHFpEF, arrythmia, HTN, HLD, CAD, a-fib and LLE DVT,  AS s/p TAVR, and PAD (RLE fem-pop bypass, revised LLE fem-pop bypass, March 2021, CFA to AT with cryovein ) , recent hospitalization for left leg pvd , redness , and for left bypass graft  thrombosed. s/p revision of the bypass with thrombectomy

## 2022-05-11 NOTE — DIETITIAN NUTRITION RISK NOTIFICATION - TREATMENT: THE FOLLOWING DIET HAS BEEN RECOMMENDED
Diet, Renal Restrictions:   For patients receiving Renal Replacement - No Protein Restr, No Conc K, No Conc Phos, Low Sodium  Supplement Feeding Modality:  Oral  Nepro Cans or Servings Per Day:  1       Frequency:  Daily (05-10-22 @ 12:30) [Active]

## 2022-05-11 NOTE — PROGRESS NOTE ADULT - SUBJECTIVE AND OBJECTIVE BOX
MICU    Patient is a 87y old  Male who presents with a chief complaint of gib (11 May 2022 11:32)      BRIEF HOSPITAL COURSE: *87M h/o GI bleed 2/2 AVMs, ESRD on HD (M/F), CAD, HTN, PPM ,  CHFpEF, arrythmia, HTN, HLD, CAD, a-fib and LLE DVT,  AS s/p TAVR, and PAD (RLE fem-pop bypass, revised LLE fem-pop bypass, March 2021, CFA to AT with cryovein ) , recent hospitalization for left leg pvd , redness , and for left bypass graft  thrombosed. s/p revision of the bypass with thrombectomy. patient was dcd on  Lovenox 1mg/kg/day,  now presents with 3-4 episodes of BPBPR started last night , and was brought  to er by daughter.   patient had another large BRBPR in er , RRT was called. sbp in 120s-150s. Micu consulted.     **    Events last 24 hours: ** no further   bleeding noted -- one  BM   --  alert and  conversant  for HD today -- high procalcitonin  - appreciate ID input  no indication for  antibiotics at this juncture ---    hb 9.4------8.5        REVIEW OF SYSTEMS     CONSTITUTIONAL   no fevers  no loss of appetite  no weight loss   HEENT  no sore throat   no ringing in ears  NECK   no pain   RESPIRATORY  see HPI   CARDIOVASCULAR  no chest  pain no palpitations   GASTROINTESTINAL no vomiting  no diarrhea    no   gerd   MUSCULOSKELETAL  no joint pains    no  back pain   SKIN   no rash   no itchiness   GENITOURINARY  no dysuria   HEME    no bleeding or bruising   ENDOCRINE     no   warmth   no  sweating   no  cold intolerance   NEUROLOGIC  no tremors  no seizures  no    weakness    PSYCHIATRIC   no mood disorder    no delirium   *    PAST MEDICAL & SURGICAL HISTORY:  DM (diabetes mellitus)  - resolved      AV block, 1st degree      Arrhythmia      CAD (coronary artery disease)      HTN (hypertension)      VT (ventricular tachycardia)      RICHARDS (dyspnea on exertion)      Anemia      Risk factors for obstructive sleep apnea      ESRD on dialysis  HD on Mondays and Fridays      Aortic stenosis  - s/p valve replacement      GI bleeding  due to ulcerated polyps and colonic AVMs      H/O circumcision  at  age  65      H/O left knee surgery      H/O angioplasty  2013,  no  intervention      A-V fistula  left arm 5/2017      H/O carotid endarterectomy  Right      S/P TAVR (transcatheter aortic valve replacement)      History of loop recorder            Medications:    isosorbide   mononitrate ER Tablet (IMDUR) 30 milliGRAM(s) Oral daily  tamsulosin 0.4 milliGRAM(s) Oral at bedtime  torsemide 20 milliGRAM(s) Oral <User Schedule>      HYDROmorphone  Injectable 0.5 milliGRAM(s) IV Push every 6 hours PRN  melatonin 3 milliGRAM(s) Oral at bedtime        pantoprazole  Injectable 40 milliGRAM(s) IV Push every 12 hours  polyethylene glycol 3350 17 Gram(s) Oral every 12 hours      atorvastatin 80 milliGRAM(s) Oral at bedtime    multivitamin Oral Tab/Cap - Peds 1 Tablet(s) Oral daily      cadexomer iodine 0.9% Gel 1 Application(s) Topical two times a day  chlorhexidine 2% Cloths 1 Application(s) Topical <User Schedule>  chlorhexidine 4% Liquid 1 Application(s) Topical <User Schedule>  hydrocortisone hemorrhoidal Suppository 1 Suppository(s) Rectal every 12 hours    sevelamer carbonate 800 milliGRAM(s) Oral three times a day          ICU Vital Signs Last 24 Hrs  T(C): 36.6 (11 May 2022 09:00), Max: 37.2 (10 May 2022 19:57)  T(F): 97.9 (11 May 2022 09:00), Max: 98.9 (10 May 2022 19:57)  HR: 66 (11 May 2022 11:00) (55 - 77)  BP: 141/62 (11 May 2022 11:00) (113/75 - 163/56)  BP(mean): 78 (11 May 2022 11:00) (62 - 105)  ABP: --  ABP(mean): --  RR: 13 (11 May 2022 11:00) (13 - 26)  SpO2: 100% (11 May 2022 11:00) (96% - 100%)          I&O's Detail    10 May 2022 07:01  -  11 May 2022 07:00  --------------------------------------------------------  IN:    Oral Fluid: 240 mL  Total IN: 240 mL    OUT:  Total OUT: 0 mL    Total NET: 240 mL      11 May 2022 07:01  -  11 May 2022 12:05  --------------------------------------------------------  IN:    IV PiggyBack: 100 mL  Total IN: 100 mL    OUT:  Total OUT: 0 mL    Total NET: 100 mL            LABS:                        8.5    5.43  )-----------( 178      ( 11 May 2022 03:44 )             27.6     05-11    138  |  98  |  57.7<H>  ----------------------------<  152<H>  3.8   |  23.0  |  3.95<H>    Ca    8.3<L>      11 May 2022 03:44  Phos  4.2     05-11  Mg     1.9     05-11    TPro  5.6<L>  /  Alb  2.9<L>  /  TBili  0.4  /  DBili  x   /  AST  36  /  ALT  22  /  AlkPhos  203<H>  05-11          CAPILLARY BLOOD GLUCOSE        PT/INR - ( 09 May 2022 13:35 )   PT: 13.3 sec;   INR: 1.14 ratio         PTT - ( 09 May 2022 13:35 )  PTT:42.5 sec    CULTURES:      Physical Examination:    General: No acute distress.   alert and conversant    HEENT: Pupils equal, reactive to light.  Symmetric.  no  thrush     PULM: Clear to auscultation bilaterally, no significant sputum production    NECK: Supple, no lymphadenopathy, trachea midline    CVS: Regular rate and rhythm, no murmurs, rubs, or gallops    ABD: Soft, nondistended, nontender, normoactive bowel sounds, no masses    EXT: No edema, nontender left leg  warm   +  gangrene   toes       SKIN: Warm and well perfused, no rashes noted.    NEURO: Alert,   communicative     DEVICES:     RADIOLOGY: *IMPRESSION: Increasing infiltrates. No acute bony finding of the left   foot.    --- End of Report ---            MELANIE RIVAS MD; Attending Radiologist  This document has been electronically signed. May 10 2022  2:57PM*    Summary:   1. Left ventricular ejection fraction, by visual estimation, is 60 to   65%.   2. Normal global left ventricular systolic function.  3. Moderate to severe left atrial enlargement.   4. Abnormal septal motion consistent with post-operative status.   5. Mildly increased LV wall thickness.   6. Mild to moderately enlarged right atrium.   7. Moderate mitral valve regurgitation.   8. Thickening and calcification of the anterior and posterior mitral   valve leaflets.   9. Transmitral mean gradient is 13.4 mmHg at HR 89 bpm.  10. Mild-moderate tricuspid regurgitation.  11. 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 Gradients suggestive of normally   functioning prosthetic valve. No significant change from prior study   dated 11/2021.  12. Estimated pulmonary artery systolic pressure is 66.4 mmHg assuming a   right atrial pressure of 3 mmHg, which is consistent with severe   pulmonary hypertension.  13. Compared to prior TTE study dated 11/2021, no significant interval   changes have occurred.    TIM Olson. Electronically signed on 4/8/2022 at 11:14:50 AM            *** Final ***  *    CRITICAL CARE TIME SPENT: ***

## 2022-05-11 NOTE — PROGRESS NOTE ADULT - SUBJECTIVE AND OBJECTIVE BOX
OVERNIGHT EVENTS: had some tarry stool     Present Symptoms:     Dyspnea: none   Nausea/Vomiting: No  Anxiety:  No  Depression: No  Fatigue: Yes   Loss of appetite: No  Constipation: none     Pain: yes - foot pain             Character-            Duration-            Effect-            Factors-            Frequency-            Location-            Severity-    Pain AD Score:  [email or web address] (press ctrl + left click to view)    Review of Systems: Reviewed                     Negative: no chest pain                   All others negative    MEDICATIONS  (STANDING):  atorvastatin 80 milliGRAM(s) Oral at bedtime  cadexomer iodine 0.9% Gel 1 Application(s) Topical two times a day  chlorhexidine 2% Cloths 1 Application(s) Topical <User Schedule>  chlorhexidine 4% Liquid 1 Application(s) Topical <User Schedule>  epoetin juan-epbx (RETACRIT) Injectable 70724 Unit(s) IV Push once  hydrocortisone hemorrhoidal Suppository 1 Suppository(s) Rectal every 12 hours  isosorbide   mononitrate ER Tablet (IMDUR) 30 milliGRAM(s) Oral daily  melatonin 3 milliGRAM(s) Oral at bedtime  multivitamin Oral Tab/Cap - Peds 1 Tablet(s) Oral daily  pantoprazole  Injectable 40 milliGRAM(s) IV Push every 12 hours  polyethylene glycol 3350 17 Gram(s) Oral every 12 hours  sevelamer carbonate 800 milliGRAM(s) Oral three times a day  tamsulosin 0.4 milliGRAM(s) Oral at bedtime  torsemide 20 milliGRAM(s) Oral <User Schedule>    MEDICATIONS  (PRN):  HYDROmorphone  Injectable 0.5 milliGRAM(s) IV Push every 6 hours PRN Moderate Pain (4 - 6)    PHYSICAL EXAM:    Vital Signs Last 24 Hrs  T(C): 36.6 (11 May 2022 09:00), Max: 37.2 (10 May 2022 19:57)  T(F): 97.9 (11 May 2022 09:00), Max: 98.9 (10 May 2022 19:57)  HR: 61 (11 May 2022 10:00) (55 - 77)  BP: 121/83 (11 May 2022 10:00) (113/75 - 163/56)  BP(mean): 94 (11 May 2022 10:00) (62 - 105)  RR: 13 (11 May 2022 10:00) (13 - 26)  SpO2: 99% (11 May 2022 10:00) (96% - 100%)    General: alert, knows he is in hospital     Karnofsky:  30 %    HEENT: normal      Lungs: comfortable     CV: normal      GI: normal     : normal      MSK: bedbound/wheelchair bound    Skin: no rash    LABS:                      8.5    5.43  )-----------( 178      ( 11 May 2022 03:44 )             27.6     05-11    138  |  98  |  57.7<H>  ----------------------------<  152<H>  3.8   |  23.0  |  3.95<H>    Ca    8.3<L>      11 May 2022 03:44  Phos  4.2     05-11  Mg     1.9     05-11    TPro  5.6<L>  /  Alb  2.9<L>  /  TBili  0.4  /  DBili  x   /  AST  36  /  ALT  22  /  AlkPhos  203<H>  05-11    PT/INR - ( 09 May 2022 13:35 )   PT: 13.3 sec;   INR: 1.14 ratio      PTT - ( 09 May 2022 13:35 )  PTT:42.5 sec    I&O's Summary    10 May 2022 07:01  -  11 May 2022 07:00  --------------------------------------------------------  IN: 240 mL / OUT: 0 mL / NET: 240 mL    11 May 2022 07:01  -  11 May 2022 10:14  --------------------------------------------------------  IN: 100 mL / OUT: 0 mL / NET: 100 mL    RADIOLOGY & ADDITIONAL STUDIES:    < from: Xray Chest 1 View- PORTABLE-Urgent (Xray Chest 1 View- PORTABLE-Urgent .) (05.10.22 @ 13:45) >  INTERPRETATION:  Chest and left foot. Patient has lower GI bleed and has   necrotic metatarsals.    AP chest on May 10, 2022 at 1:35 PM.    Heart possibly enlarged. Left loop recorder, however aortic valve, and   Micra pacemaker again noted.    There are increasing mid lower lung field infiltrates compared to   November 21,2021.    Left foot.    COMPARISON: None available. 3 views.    There is balanced deformity of all AP joints greatest at the hallux level.    Arterial calcifications are seen. Clips are seen anterior to the ankle.    There are posterior and inferior small calcaneal spurs.    There are some degenerative changes in the lower tarsal area.    No bone destruction or fracture evident on standard films.    IMPRESSION: Increasing infiltrates. No acute bony finding of the left   foot.    --- End of Report ---    < end of copied text >    ADVANCE DIRECTIVES/TREATMENT PREFERENCES:  Full code

## 2022-05-11 NOTE — DIETITIAN INITIAL EVALUATION ADULT - PERTINENT MEDS FT
MEDICATIONS  (STANDING):  atorvastatin 80 milliGRAM(s) Oral at bedtime  cadexomer iodine 0.9% Gel 1 Application(s) Topical two times a day  chlorhexidine 2% Cloths 1 Application(s) Topical <User Schedule>  chlorhexidine 4% Liquid 1 Application(s) Topical <User Schedule>  hydrocortisone hemorrhoidal Suppository 1 Suppository(s) Rectal every 12 hours  isosorbide   mononitrate ER Tablet (IMDUR) 30 milliGRAM(s) Oral daily  melatonin 3 milliGRAM(s) Oral at bedtime  multivitamin Oral Tab/Cap - Peds 1 Tablet(s) Oral daily  pantoprazole  Injectable 40 milliGRAM(s) IV Push every 12 hours  polyethylene glycol 3350 17 Gram(s) Oral every 12 hours  sevelamer carbonate 800 milliGRAM(s) Oral three times a day  tamsulosin 0.4 milliGRAM(s) Oral at bedtime  torsemide 20 milliGRAM(s) Oral <User Schedule>    MEDICATIONS  (PRN):  HYDROmorphone  Injectable 0.5 milliGRAM(s) IV Push every 6 hours PRN Moderate Pain (4 - 6)

## 2022-05-11 NOTE — CONSULT NOTE ADULT - TIME BILLING
chart review, patient eval, mdm
chart review, lab review, imaging review, notes review. Discussion with  and coordination of care with all relevant providers and consultants caring for patient. Discussion with patients family as well as ICU team.

## 2022-05-11 NOTE — DIETITIAN INITIAL EVALUATION ADULT - SIGNS/SYMPTOMS
as evidenced by pt with severe muscle wasting/fat loss; likely meeting <75% est energy intake > 1 mo as evidenced by pt with severe muscle wasting/fat loss; likely meeting <75% est energy intake > 3 mo

## 2022-05-11 NOTE — PROGRESS NOTE ADULT - SUBJECTIVE AND OBJECTIVE BOX
[facility]                                      [facility]                              [address] [place] [address]                                                 PHONE: [phone]                                                 FAX: [phone]  ------------------------------------------------------------------------------------------------    Chief complaint: anemia, GIB    87y Male known to us from prior admissions presented to [facility] ED for 3-4 BRBPR since last night. Pt  was recently d/c'ed from [facility] on Lovenox 1mg/kg/day for previous bypass occlusion.   At the time of evaluation, pt is in bed. Reports feeling better. Had BM earlier. Underwent HD on Monday and due for Friday.    RECENT EVENTS    Remains in the ICU  receiving HD  No CP or SOB  Hemodynamically stable      PAST MEDICAL & SURGICAL HISTORY:  DM (diabetes mellitus)  - resolved      AV block, 1st degree      Arrhythmia      CAD (coronary artery disease)      HTN (hypertension)      VT (ventricular tachycardia)      RICHARDS (dyspnea on exertion)      Anemia      Risk factors for obstructive sleep apnea      ESRD on dialysis  HD on Mondays and Fridays      Aortic stenosis  - s/p valve replacement      GI bleeding  due to ulcerated polyps and colonic AVMs      H/O circumcision  at  age  65      H/O left knee surgery      H/O angioplasty  2013,  no  intervention      A-V fistula  left arm 5/2017      H/O carotid endarterectomy  Right      S/P TAVR (transcatheter aortic valve replacement)      History of loop recorder          Plavix (Hives)  Toprol-XL (Rash)      Review of Systems:  Positive for fatigue  Rest of the systems were reviewed and was negative.     Family history:   No pertinent family history in first degree relative of early CAD, sudden cardiac death or  congenital heart disease    Social History:  No smoking   No alcohol  No other drug use    Vital Signs Last 24 Hrs  T(C): 36.4 (10 May 2022 16:00), Max: 36.8 (10 May 2022 07:29)  T(F): 97.6 (10 May 2022 16:00), Max: 98.2 (10 May 2022 07:29)  HR: 66 (10 May 2022 15:00) (55 - 78)  BP: 125/59 (10 May 2022 15:00) (94/81 - 175/146)  BP(mean): 80 (10 May 2022 15:00) (62 - 154)  RR: 23 (10 May 2022 15:00) (14 - 25)  SpO2: 99% (10 May 2022 15:00) (93% - 100%)    PHYSICAL EXAM:  Constitutional: Appears well; alert and co-operative  HEENT:     Head: Normocephalic and atraumatic  Eyes: Conjunctiva normal, No scleral icterus  Neck: Supple, No JVD  Mouth/Throat: Mucous membranes are normal. Mucosa are not pale or dry.  Cardiovascular: regular S1, S2 + ESM  Respiratory: Lungs clear to auscultation; no crepitations, no wheeze  Gastrointestinal:  Soft, Non-tender, + BS	  Musculoskeletal: Normal range of motion. No edema  Skin: Warm and dry. No cyanosis . No diaphoresis.  Neurologic: Alert oriented to time place and person. Normal strength and no tremor.  Psychiatric: Normal mood and affect, Speech is normal and behavior is normal.        LABS:                        10.1   6.21  )-----------( 252      ( 10 May 2022 15:00 )             32.6     05-10    137  |  97<L>  |  38.9<H>  ----------------------------<  59<L>  3.5   |  23.0  |  3.23<H>    Ca    8.6      10 May 2022 05:33  Phos  3.4     05-10  Mg     1.8     05-10    TPro  5.8<L>  /  Alb  2.9<L>  /  TBili  0.9  /  DBili  x   /  AST  30  /  ALT  17  /  AlkPhos  203<H>  05-10        PT/INR - ( 09 May 2022 13:35 )   PT: 13.3 sec;   INR: 1.14 ratio         PTT - ( 09 May 2022 13:35 )  PTT:42.5 sec    RADIOLOGY & ADDITIONAL STUDIES: (reviewed)  CXR was independently visualized/reviewed and demonstrated:  Increasing infiltrates. No acute bony finding of the left   foot.      CARDIOLOGY TESTING:(reviewed)     ECG (Independent visualization): Paced    ECHOCARDIOGRAM :  Summary:   1. Left ventricular ejection fraction, by visual estimation, is 60 to   65%.   2. Normal global left ventricular systolic function.  3. Moderate to severe left atrial enlargement.   4. Abnormal septal motion consistent with post-operative status.   5. Mildly increased LV wall thickness.   6. Mild to moderately enlarged right atrium.   7. Moderate mitral valve regurgitation.   8. Thickening and calcification of the anterior and posterior mitral   valve leaflets.   9. Transmitral mean gradient is 13.4 mmHg at HR 89 bpm.  10. Mild-moderate tricuspid regurgitation.  11. 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 Gradients suggestive of normally   functioning prosthetic valve. No significant change from prior study   dated 11/2021.  12. Estimated pulmonary artery systolic pressure is 66.4 mmHg assuming a   right atrial pressure of 3 mmHg, which is consistent with severe   pulmonary hypertension.  13. Compared to prior TTE study dated 11/2021, no significant interval   changes have occurred.    MEDICATIONS:(reviewed)  Home Medications:    MEDICATIONS  (STANDING):  atorvastatin 80 milliGRAM(s) Oral at bedtime  cadexomer iodine 0.9% Gel 1 Application(s) Topical two times a day  chlorhexidine 2% Cloths 1 Application(s) Topical <User Schedule>  chlorhexidine 4% Liquid 1 Application(s) Topical <User Schedule>  DULoxetine 30 milliGRAM(s) Oral daily  heparin   Injectable 5000 Unit(s) SubCutaneous every 12 hours  hydrocortisone hemorrhoidal Suppository 1 Suppository(s) Rectal every 12 hours  isosorbide   mononitrate ER Tablet (IMDUR) 30 milliGRAM(s) Oral daily  melatonin 3 milliGRAM(s) Oral at bedtime  multivitamin Oral Tab/Cap - Peds 1 Tablet(s) Oral daily  pantoprazole  Injectable 40 milliGRAM(s) IV Push every 12 hours  polyethylene glycol 3350 17 Gram(s) Oral every 12 hours  sevelamer carbonate 800 milliGRAM(s) Oral three times a day  tamsulosin 0.4 milliGRAM(s) Oral at bedtime  torsemide 20 milliGRAM(s) Oral <User Schedule>  traZODone 50 milliGRAM(s) Oral at bedtime    MEDICATIONS  (PRN):      ASSESSMENT AND PLAN:    87y Male with past medical history significant for ESRD on HD PAF off AC due to GIBs, leadless PPM, ILR in place, non obstructive CAD with normal EF, AS s/p TAVR, ESRD on HD, PAD s/p RLE fem-pop bypass, w/ revised LLE fem-bypass in march of 2021, complicated by LLE SFA and pop artery occlusion w/ DP and AT runoff s/p L fem to AT bypass w/ cryovein on 4/13/22.  Pt  was  d/c'ed  on Lovenox 1mg/kg/day for previous bypass occlusion.       MICU care Volume status stable on HD  H/H stable  Off AC due to recurrent GIB

## 2022-05-11 NOTE — PROGRESS NOTE ADULT - NS ATTEND AMEND GEN_ALL_CORE FT
This morning he feels well and denies any abdominal pain, nausea or vomiting.  He is tolerating his diet.  When seen this morning he was undergoing hemodialysis.  His hemoglobin was slightly low this morning but this may very well be due to hemodilution as the blood was drawn just prior to starting dialysis.  For the present we will simply monitor his hemoglobin and hematocrit.

## 2022-05-11 NOTE — CHART NOTE - NSCHARTNOTEFT_GEN_A_CORE
Palliative care social work note.    SW contacted patients daughter Vanessa to offer support in coping with patients health issues and hospitalization. daughter engaged with SW in addressing patients prior quality of life and functional abilities and is hopeful for patient recovery. Palliative care to follow and daughter appreciative of involvement.

## 2022-05-11 NOTE — PROGRESS NOTE ADULT - PROBLEM SELECTOR PROBLEM 4
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

## 2022-05-11 NOTE — PROGRESS NOTE ADULT - SUBJECTIVE AND OBJECTIVE BOX
Chief Complaint: This is a 87y old man patient being seen in follow-up consultation for rectal bleed.     Interval HPI / 24H events:  Patient seen and evaluated at bedside, reporting no complaints, eating breakfast.  CTA abdomen pelvis on admission showed active bleeding at the level of anal canal, now s/p sigmoidoscopy reveals no visible source of active bleeding, shows proctitis.  His hemoglobin 8.5 today, down from 9.4 gm yesterday. Reported 1 melanotic BM overnight per ICU nurse.  Patient denies nausea, vomiting, abdominal pain, chest pain, shortness of breath, hematemesis.      Review of Systems:  · ENMT: negative  · Respiratory and Thorax: negative  · Cardiovascular: negative  · Gastrointestinal: see above.  · Genitourinary:	negative  · Musculoskeletal: negative  · Neurological: negative  · Psychiatric: negative  · Hematology/Lymphatics: negative  · Endocrine: negative                                                   .       PAST MEDICAL/SURGICAL HISTORY:  DM (diabetes mellitus)  - resolved    AV block, 1st degree    Arrhythmia    CAD (coronary artery disease)    HTN (hypertension)    VT (ventricular tachycardia)    RICHARDS (dyspnea on exertion)    Anemia    Risk factors for obstructive sleep apnea    ESRD on dialysis  HD on Mondays and Fridays    Aortic stenosis  - s/p valve replacement    GI bleeding  due to ulcerated polyps and colonic AVMs    H/O circumcision  at  age  65    H/O left knee surgery    H/O angioplasty  2013,  no  intervention    A-V fistula  left arm 5/2017    H/O carotid endarterectomy  Right    S/P TAVR (transcatheter aortic valve replacement)    History of loop recorder      MEDICATIONS  (STANDING):  atorvastatin 80 milliGRAM(s) Oral at bedtime  cadexomer iodine 0.9% Gel 1 Application(s) Topical two times a day  chlorhexidine 2% Cloths 1 Application(s) Topical <User Schedule>  chlorhexidine 4% Liquid 1 Application(s) Topical <User Schedule>  epoetin juan-epbx (RETACRIT) Injectable 37516 Unit(s) IV Push once  hydrocortisone hemorrhoidal Suppository 1 Suppository(s) Rectal every 12 hours  isosorbide   mononitrate ER Tablet (IMDUR) 30 milliGRAM(s) Oral daily  melatonin 3 milliGRAM(s) Oral at bedtime  multivitamin Oral Tab/Cap - Peds 1 Tablet(s) Oral daily  pantoprazole  Injectable 40 milliGRAM(s) IV Push every 12 hours  polyethylene glycol 3350 17 Gram(s) Oral every 12 hours  sevelamer carbonate 800 milliGRAM(s) Oral three times a day  tamsulosin 0.4 milliGRAM(s) Oral at bedtime  torsemide 20 milliGRAM(s) Oral <User Schedule>    MEDICATIONS  (PRN):  HYDROmorphone  Injectable 0.5 milliGRAM(s) IV Push every 6 hours PRN Moderate Pain (4 - 6)    Plavix (Hives)  Toprol-XL (Rash)    T(C): 36.6 (05-11-22 @ 09:00), Max: 37.2 (05-10-22 @ 19:57)  HR: 61 (05-11-22 @ 10:00) (55 - 77)  BP: 121/83 (05-11-22 @ 10:00) (113/75 - 163/56)  RR: 13 (05-11-22 @ 10:00) (13 - 26)  SpO2: 99% (05-11-22 @ 10:00) (96% - 100%)    I&O's Summary    10 May 2022 07:01  -  11 May 2022 07:00  --------------------------------------------------------  IN: 240 mL / OUT: 0 mL / NET: 240 mL    11 May 2022 07:01  -  11 May 2022 11:07  --------------------------------------------------------  IN: 100 mL / OUT: 0 mL / NET: 100 mL      PHYSICAL EXAM:    Constitutional: Elderly, frail male, in no acute distress  Neuro: Awake alert, oriented  HEENT: PERRL, anicteric sclerae  Neck: supple, no JVD  CV: regular rate, regular rhythm, +S1S2,   Pulm/chest: lung sounds clear bilaterally, no accessory muscle use noted  Abd: soft, NT, ND, +BS. No rigidity, guarding, rebound tenderness    Ext: no Cyanosis, clubbing, trace BLE edema, +LLE dressing dry and intact.   Skin: warm, well perfused, no jaundice   Psych: calm, appropriate affect      LABS:               8.5    5.43  )-----------( 178      ( 05-11 @ 03:44 )             27.6                9.4    5.73  )-----------( 205      ( 05-10 @ 22:32 )             29.8                10.1   6.21  )-----------( 252      ( 05-10 @ 15:00 )             32.6                9.4    4.51  )-----------( 191      ( 05-10 @ 05:33 )             30.0                9.9    4.68  )-----------( 209      ( 05-09 @ 19:50 )             31.4                9.8    5.85  )-----------( 199      ( 05-09 @ 13:33 )             31.4                8.1    5.13  )-----------( 197      ( 05-09 @ 09:20 )             26.4                8.7    6.73  )-----------( 215      ( 05-09 @ 04:51 )             28.0       05-11    138  |  98  |  57.7<H>  ----------------------------<  152<H>  3.8   |  23.0  |  3.95<H>    Ca    8.3<L>      11 May 2022 03:44  Phos  4.2     05-11  Mg     1.9     05-11    TPro  5.6<L>  /  Alb  2.9<L>  /  TBili  0.4  /  DBili  x   /  AST  36  /  ALT  22  /  AlkPhos  203<H>  05-11    LIVER FUNCTIONS - ( 11 May 2022 03:44 )  Alb: 2.9 g/dL / Pro: 5.6 g/dL / ALK PHOS: 203 U/L / ALT: 22 U/L / AST: 36 U/L / GGT: x               PT/INR - ( 09 May 2022 13:35 )   PT: 13.3 sec;   INR: 1.14 ratio         PTT - ( 09 May 2022 13:35 )  PTT:42.5 sec

## 2022-05-11 NOTE — CONSULT NOTE ADULT - PROVIDER SPECIALTY LIST ADULT
Cardiology
Palliative Care
Podiatry
Vascular Surgery
MICU
Heme/Onc
Infectious Disease
Nephrology
Gastroenterology

## 2022-05-11 NOTE — PROGRESS NOTE ADULT - SUBJECTIVE AND OBJECTIVE BOX
INTERVAL HPI/OVERNIGHT EVENTS:    Seen at bedside this morning 1 tarry bowel movement overnight, AC still held      MEDICATIONS  (STANDING):  atorvastatin 80 milliGRAM(s) Oral at bedtime  cadexomer iodine 0.9% Gel 1 Application(s) Topical two times a day  chlorhexidine 2% Cloths 1 Application(s) Topical <User Schedule>  chlorhexidine 4% Liquid 1 Application(s) Topical <User Schedule>  DULoxetine 30 milliGRAM(s) Oral daily  hydrocortisone hemorrhoidal Suppository 1 Suppository(s) Rectal every 12 hours  isosorbide   mononitrate ER Tablet (IMDUR) 30 milliGRAM(s) Oral daily  melatonin 3 milliGRAM(s) Oral at bedtime  multivitamin Oral Tab/Cap - Peds 1 Tablet(s) Oral daily  pantoprazole  Injectable 40 milliGRAM(s) IV Push every 12 hours  polyethylene glycol 3350 17 Gram(s) Oral every 12 hours  sevelamer carbonate 800 milliGRAM(s) Oral three times a day  tamsulosin 0.4 milliGRAM(s) Oral at bedtime  torsemide 20 milliGRAM(s) Oral <User Schedule>  traZODone 50 milliGRAM(s) Oral at bedtime    MEDICATIONS  (PRN):  HYDROmorphone  Injectable 0.5 milliGRAM(s) IV Push every 6 hours PRN Moderate Pain (4 - 6)        Vital Signs Last 24 Hrs  T(C): 36.8 (11 May 2022 03:20), Max: 37.2 (10 May 2022 19:57)  T(F): 98.3 (11 May 2022 03:20), Max: 98.9 (10 May 2022 19:57)  HR: 64 (11 May 2022 06:00) (55 - 77)  BP: 122/66 (11 May 2022 06:00) (113/75 - 163/56)  BP(mean): 82 (11 May 2022 06:00) (62 - 105)  RR: 17 (11 May 2022 06:00) (13 - 26)  SpO2: 100% (11 May 2022 06:00) (96% - 100%)      Physical Exam:  GEN: NAD, laying in bed, appears stated age  HEENT: NCAT, clear oral mucosa, normal conjunctiva  Chest: non-tender  CV:  non-tachycardic, 2+ radial pulse  Pulm: no increased work of breathing, no conversational dyspnea  GI: soft, non-tender  MSK: moving all extremities   Vasc: Palpable DP pulse 2+ bilaterally. Well healed groin incision. Left leg wounds with dressing in place, dry gangrene of toes. Healing wound of lateral leg    I&O's Detail    10 May 2022 07:01  -  11 May 2022 07:00  --------------------------------------------------------  IN:    Oral Fluid: 240 mL  Total IN: 240 mL    OUT:  Total OUT: 0 mL    Total NET: 240 mL                            8.5    5.43  )-----------( 178      ( 11 May 2022 03:44 )             27.6   05-11    138  |  98  |  57.7<H>  ----------------------------<  152<H>  3.8   |  23.0  |  3.95<H>    Ca    8.3<L>      11 May 2022 03:44  Phos  4.2     05-11  Mg     1.9     05-11    TPro  5.6<L>  /  Alb  2.9<L>  /  TBili  0.4  /  DBili  x   /  AST  36  /  ALT  22  /  AlkPhos  203<H>  05-11

## 2022-05-11 NOTE — CONSULT NOTE ADULT - CONSULT REASON
" goals"
GI bleed
Active GIB
GI bleed, rectal bleed
GANGRENE TOES
DVT, anticoagulation management
ESRD for HD
L foot dry gangrene
s/p PPM, Anemia

## 2022-05-11 NOTE — PROGRESS NOTE ADULT - ASSESSMENT
87M with ESRD on HD, anemia, CHF with preserved EF, HTN, CAD, AFIB, PVD s/p recent bypass, LLE DVT, admitted with gastrointestinal bleeding.     #1 Gastrointestinal bleeding  - s/p CTA with bleeding noted in anal canal  - s/p sigmoidoscopy with no active bleeding  - hemoglobin stable  - gi following, transfuse if necessary, anusol for internal hemorrhoids  - monitor for signs of bleeding  - hemodynamically stable    #2 PVD  - s/p multiple LE bypasses  - recent bypass 4/20  - will need therapeutic Lovenox when able per medical team, still had some tarry stool     #3 DVT, left femoral and popliteal   - needs to be back on therapeutic AC when cleared by medicine   - off PPX dose heparin due to tarry stool overnight     #4 ESRD  - HD per nephrology    #5 Encounter for palliative care  - continuing to follow for support

## 2022-05-11 NOTE — PROGRESS NOTE ADULT - SUBJECTIVE AND OBJECTIVE BOX
Patient was seen and re-evaluated on dialysis.   Patient is tolerating the procedure well.   T(C): 36.6 (05-11-22 @ 09:00), Max: 37.2 (05-10-22 @ 19:57)  HR: 66 (05-11-22 @ 11:00) (55 - 77)  BP: 141/62 (05-11-22 @ 11:00) (113/75 - 163/56)  Continue dialysis:   Dialyzer: revaclear 300 QB: 400 ml QD: 500ml  Goal UF 0.5 kg  over 3 Hours   Hb low, pt with melanotic stools as per RN  give 1 U PRBC  AMY 10,000 IU IV with HD    dw MICU team, HD RN

## 2022-05-11 NOTE — CONSULT NOTE ADULT - CONSULT REQUESTED DATE/TIME
09-May-2022 06:34
11-May-2022
09-May-2022 10:20
09-May-2022 11:04
10-May-2022 14:18
11-May-2022
09-May-2022 08:13
10-May-2022 12:37
10-May-2022 15:52

## 2022-05-11 NOTE — PROGRESS NOTE ADULT - ASSESSMENT
87M h/o GI bleed 2/2 AVMs, ESRD on HD (M/F), CAD, HTN, PPM ,  CHFpEF, arrythmia, HTN, HLD, CAD, a-fib and LLE DVT,  AS s/p TAVR, and PAD (RLE fem-pop bypass, revised LLE fem-pop bypass, March 2021, CFA to AT with cryovein ) , recent hospitalization for left leg pvd , redness , and for left bypass graft  thrombosed. s/p revision of the bypass with thrombectomy. patient was dcd   4/27/2022     on  Lovenox 1mg/kg/day,  now presents with 3-4 episodes of BPBPR started last night , and was brought to er by daughter.   patient had another large BRBPR in er , RRT was called. sbp in 120s-150s. Micu consulted.     1-  GIB s/p one unit prbc   CTA-abd:   anal canal enhancement  - active bleed -    colonoscopy   proctitis   2-  PVD  s/p   left LE  bypass  revision 4/13/2022  complicated by 4/14/22  clot removed  ( initial 2021)  was on full a/c  3- cad/   wireless pacer/    afib /   s/p  TAVR  /  severe  pulm htn /  Moderate MR / Pacer 4/2022  VDD (10/2020) RVpaced   4-  duplex   chronic  Left femoral  dvt  5-  ESRD on HD     Mon Frid  6- MRI  bilateral  frontal  subdural collections  suggesting  hematomas vs  hydromas       neurologic  no nfocal  alert    resp   poor films  monitor  for aspiration   f/u  cxr after  dialysis today      ID  no indication   for antibiotics   will f/u  procalcitonin     renal   dialysis today  with  transfusion x one unit    heme/gi    no  further bleeding noted  after one unit prbc total  second today,    discussed with  hematology to start a/c today ( eliquis   vs other) --    PPI  anusol    gi tolerating po   aspiration precautions     monitor  bm    cardiology   pacer     cardiology follow up     [facility] [place]   patient is high risk of  cardiac events    and deterioration    - will consult  palliative care   as  goals of care  need to be clarified -   prognosis is poor and guarded  -  \    Critical  care TIme  > 45 minutes were spent assessing the patient's presenting problems of acute illness that pose a high probability   of life threatening  deterioration or end organ damage / dysfunction.  Medical decision making includes initiation/ continuation of plan or care review data/labwork/  radiographic study,  direct patient  care bedside ,  discussions with  consultants regarding care,     evaluation  and  interpretation of vital signs,  any necessary ventilator management,   NIV or BIPAP changes  or initiation,    discussions with multidisciplinary team,  am or pm rounds,  discussions of goals of care with patient and family  all non-inclusive of procedures.

## 2022-05-11 NOTE — DIETITIAN INITIAL EVALUATION ADULT - PERTINENT LABORATORY DATA
05-11    138  |  98  |  57.7<H>  ----------------------------<  152<H>  3.8   |  23.0  |  3.95<H>    Ca    8.3<L>      11 May 2022 03:44  Phos  4.2     05-11  Mg     1.9     05-11    TPro  5.6<L>  /  Alb  2.9<L>  /  TBili  0.4  /  DBili  x   /  AST  36  /  ALT  22  /  AlkPhos  203<H>  05-11  A1C with Estimated Average Glucose Result: 5.0 % (07-14-21 @ 11:36)   05-11    138  |  98  |  57.7<H>  ----------------------------<  152<H>  3.8   |  23.0  |  3.95<H>    Ca    8.3<L>      11 May 2022 03:44  Phos  4.2     05-11  Mg     1.9     05-11    TPro  5.6<L>  /  Alb  2.9<L>  /  TBili  0.4  /  DBili  x   /  AST  36  /  ALT  22  /  AlkPhos  203<H>  05-11  A1C with Estimated Average Glucose Result: 5.0 % (07-14-21 @ 11:36)    05-11 Na138 mmol/L Glu 152 mg/dL<H> K+ 3.8 mmol/L Cr  3.95 mg/dL<H> BUN 57.7 mg/dL<H> Phos 4.2 mg/dL Alb 2.9 g/dL<L> PAB n/a

## 2022-05-11 NOTE — CONSULT NOTE ADULT - SUBJECTIVE AND OBJECTIVE BOX
REASON FOR CONSULTATION:     HPI:  87M h/o GI bleed 2/2 AVMs, ESRD on HD (M/F), CAD, HTN, PPM ,  CHFpEF, arrythmia, HTN, HLD, CAD, a-fib and LLE DVT,  AS s/p TAVR, and PAD (RLE fem-pop bypass, revised LLE fem-pop bypass, March 2021, CFA to AT with cryovein ) , recent hospitalization for left leg pvd , redness , and for left bypass graft  thrombosed. s/p revision of the bypass with thrombectomy. patient was dc'd on  Lovenox 1mg/kg/day,  now presents with 3-4 episodes of BPBPR started last night , and was chano to er by daughter.   patient had another large BRBPR in er , RRT was called. sbp in 120s-150s. Micu consulted.      (09 May 2022 09:15)      REVIEW OF SYSTEMS:  Constitutional, Eyes, ENT, Cardiovascular, Respiratory, Gastrointestinal, Genitourinary, Musculoskeletal, Integumentary, Neurological, Psychiatric, Endocrine, Heme/Lymph, and Allergic/Immunologic review of systems are otherwise negative except as noted in the HPI.    PAST MEDICAL & SURGICAL HISTORY:  DM (diabetes mellitus)  - resolved      AV block, 1st degree      Arrhythmia      CAD (coronary artery disease)      HTN (hypertension)      VT (ventricular tachycardia)      RICHARDS (dyspnea on exertion)      Anemia      Risk factors for obstructive sleep apnea      ESRD on dialysis  HD on Mondays and Fridays      Aortic stenosis  - s/p valve replacement      GI bleeding  due to ulcerated polyps and colonic AVMs      H/O circumcision  at  age  65      H/O left knee surgery      H/O angioplasty  2013,  no  intervention      A-V fistula  left arm 5/2017      H/O carotid endarterectomy  Right      S/P TAVR (transcatheter aortic valve replacement)      History of loop recorder          FAMILY HISTORY:  Family history of cancer (Grandparent)    Family history of lung cancer (Grandparent)    Family history of premature CAD    FH: type 2 diabetes        SOCIAL HISTORY:    Allergies    Plavix (Hives)  Toprol-XL (Rash)    Intolerances        MEDICATIONS  (STANDING):  atorvastatin 80 milliGRAM(s) Oral at bedtime  cadexomer iodine 0.9% Gel 1 Application(s) Topical two times a day  chlorhexidine 2% Cloths 1 Application(s) Topical <User Schedule>  chlorhexidine 4% Liquid 1 Application(s) Topical <User Schedule>  epoetin juan-epbx (RETACRIT) Injectable 59183 Unit(s) IV Push once  hydrocortisone hemorrhoidal Suppository 1 Suppository(s) Rectal every 12 hours  isosorbide   mononitrate ER Tablet (IMDUR) 30 milliGRAM(s) Oral daily  melatonin 3 milliGRAM(s) Oral at bedtime  multivitamin Oral Tab/Cap - Peds 1 Tablet(s) Oral daily  pantoprazole  Injectable 40 milliGRAM(s) IV Push every 12 hours  polyethylene glycol 3350 17 Gram(s) Oral every 12 hours  sevelamer carbonate 800 milliGRAM(s) Oral three times a day  tamsulosin 0.4 milliGRAM(s) Oral at bedtime  torsemide 20 milliGRAM(s) Oral <User Schedule>    MEDICATIONS  (PRN):  HYDROmorphone  Injectable 0.5 milliGRAM(s) IV Push every 6 hours PRN Moderate Pain (4 - 6)      Vital Signs Last 24 Hrs  T(C): 36.6 (11 May 2022 09:00), Max: 37.2 (10 May 2022 19:57)  T(F): 97.9 (11 May 2022 09:00), Max: 98.9 (10 May 2022 19:57)  HR: 66 (11 May 2022 11:00) (55 - 77)  BP: 141/62 (11 May 2022 11:00) (113/75 - 163/56)  BP(mean): 78 (11 May 2022 11:00) (62 - 105)  RR: 13 (11 May 2022 11:00) (13 - 26)  SpO2: 100% (11 May 2022 11:00) (96% - 100%)    PHYSICAL EXAM:    GENERAL: NAD, well-groomed, well-developed  HEAD:  Atraumatic, Normocephalic  EYES: EOMI, PERRLA, conjunctiva and sclera clear  ENMT: No tonsillar erythema, exudates, or enlargement; Moist mucous membranes, Good dentition, No lesions  NECK: Supple, No JVD, Normal thyroid  NERVOUS SYSTEM:  Alert & Oriented X3, Good concentration; Motor Strength 5/5 B/L upper and lower extremities; DTRs 2+ intact and symmetric  CHEST/LUNG: Clear to auscultation bilaterally; No rales, rhonchi, wheezing, or rubs  HEART: Regular rate and rhythm; No murmurs, rubs, or gallops  ABDOMEN: Soft, Nontender, Nondistended; Bowel sounds present  EXTREMITIES:  2+ Peripheral Pulses, No clubbing, cyanosis, or edema  LYMPH: No lymphadenopathy noted  SKIN: No rashes or lesions      LABS:                        8.5    5.43  )-----------( 178      ( 11 May 2022 03:44 )             27.6     05-11    138  |  98  |  57.7<H>  ----------------------------<  152<H>  3.8   |  23.0  |  3.95<H>    Ca    8.3<L>      11 May 2022 03:44  Phos  4.2     05-11  Mg     1.9     05-11    TPro  5.6<L>  /  Alb  2.9<L>  /  TBili  0.4  /  DBili  x   /  AST  36  /  ALT  22  /  AlkPhos  203<H>  05-11    PT/INR - ( 09 May 2022 13:35 )   PT: 13.3 sec;   INR: 1.14 ratio         PTT - ( 09 May 2022 13:35 )  PTT:42.5 sec        RADIOLOGY & ADDITIONAL STUDIES:    PATHOLOGY:     REASON FOR CONSULTATION:     HPI:  87M h/o GI bleed 2/2 AVMs, ESRD on HD (M/F), CAD, HTN, PPM ,  CHFpEF, arrythmia, HTN, HLD, CAD, a-fib and LLE DVT,  AS s/p TAVR, and PAD (RLE fem-pop bypass, revised LLE fem-pop bypass, March 2021, CFA to AT with cryovein ) , recent hospitalization for left leg pvd , redness , and for left bypass graft  thrombosed. s/p revision of the bypass with thrombectomy. patient was dc'd on  Lovenox 1mg/kg/day,  Now admitted with lower GIB.  S/p blood transfusion on 5/9 and 5/11.  S/p sigmoidoscopy.  Previously on eliquis 2.5 mg bid but stopped due to GIB.      (09 May 2022 09:15)      REVIEW OF SYSTEMS:  Constitutional, Eyes, ENT, Cardiovascular, Respiratory, Gastrointestinal, Genitourinary, Musculoskeletal, Integumentary, Neurological, Psychiatric, Endocrine, Heme/Lymph, and Allergic/Immunologic review of systems are otherwise negative except as noted in the HPI.    PAST MEDICAL & SURGICAL HISTORY:  DM (diabetes mellitus)  - resolved      AV block, 1st degree      Arrhythmia      CAD (coronary artery disease)      HTN (hypertension)      VT (ventricular tachycardia)      RICHARDS (dyspnea on exertion)      Anemia      Risk factors for obstructive sleep apnea      ESRD on dialysis  HD on Mondays and Fridays      Aortic stenosis  - s/p valve replacement      GI bleeding  due to ulcerated polyps and colonic AVMs      H/O circumcision  at  age  65      H/O left knee surgery      H/O angioplasty  2013,  no  intervention      A-V fistula  left arm 5/2017      H/O carotid endarterectomy  Right      S/P TAVR (transcatheter aortic valve replacement)      History of loop recorder          FAMILY HISTORY:  Family history of cancer (Grandparent)    Family history of lung cancer (Grandparent)    Family history of premature CAD    FH: type 2 diabetes        SOCIAL HISTORY:    Allergies    Plavix (Hives)  Toprol-XL (Rash)    Intolerances        MEDICATIONS  (STANDING):  atorvastatin 80 milliGRAM(s) Oral at bedtime  cadexomer iodine 0.9% Gel 1 Application(s) Topical two times a day  chlorhexidine 2% Cloths 1 Application(s) Topical <User Schedule>  chlorhexidine 4% Liquid 1 Application(s) Topical <User Schedule>  epoetin juan-epbx (RETACRIT) Injectable 18701 Unit(s) IV Push once  hydrocortisone hemorrhoidal Suppository 1 Suppository(s) Rectal every 12 hours  isosorbide   mononitrate ER Tablet (IMDUR) 30 milliGRAM(s) Oral daily  melatonin 3 milliGRAM(s) Oral at bedtime  multivitamin Oral Tab/Cap - Peds 1 Tablet(s) Oral daily  pantoprazole  Injectable 40 milliGRAM(s) IV Push every 12 hours  polyethylene glycol 3350 17 Gram(s) Oral every 12 hours  sevelamer carbonate 800 milliGRAM(s) Oral three times a day  tamsulosin 0.4 milliGRAM(s) Oral at bedtime  torsemide 20 milliGRAM(s) Oral <User Schedule>    MEDICATIONS  (PRN):  HYDROmorphone  Injectable 0.5 milliGRAM(s) IV Push every 6 hours PRN Moderate Pain (4 - 6)      Vital Signs Last 24 Hrs  T(C): 36.6 (11 May 2022 09:00), Max: 37.2 (10 May 2022 19:57)  T(F): 97.9 (11 May 2022 09:00), Max: 98.9 (10 May 2022 19:57)  HR: 66 (11 May 2022 11:00) (55 - 77)  BP: 141/62 (11 May 2022 11:00) (113/75 - 163/56)  BP(mean): 78 (11 May 2022 11:00) (62 - 105)  RR: 13 (11 May 2022 11:00) (13 - 26)  SpO2: 100% (11 May 2022 11:00) (96% - 100%)    PHYSICAL EXAM:    GENERAL: NAD, frail older gentleman  HEAD:  Atraumatic, Normocephalic  EYES: EOMI,  NECK: Supple,  EXTREMITIES: no edema        LABS:                        8.5    5.43  )-----------( 178      ( 11 May 2022 03:44 )             27.6     05-11    138  |  98  |  57.7<H>  ----------------------------<  152<H>  3.8   |  23.0  |  3.95<H>    Ca    8.3<L>      11 May 2022 03:44  Phos  4.2     05-11  Mg     1.9     05-11    TPro  5.6<L>  /  Alb  2.9<L>  /  TBili  0.4  /  DBili  x   /  AST  36  /  ALT  22  /  AlkPhos  203<H>  05-11    PT/INR - ( 09 May 2022 13:35 )   PT: 13.3 sec;   INR: 1.14 ratio         PTT - ( 09 May 2022 13:35 )  PTT:42.5 sec        RADIOLOGY & ADDITIONAL STUDIES:    PATHOLOGY:

## 2022-05-11 NOTE — CONSULT NOTE ADULT - ASSESSMENT
87M h/o GI bleed 2/2 AVMs, ESRD on HD (M/F), CAD, HTN, PPM ,  CHFpEF, arrythmia, HTN, HLD, CAD, a-fib and LLE DVT,  AS s/p TAVR, and PAD (RLE fem-pop bypass, revised LLE fem-pop bypass, recent hospitalization for left leg pvd , redness, and for left bypass graft thrombosed. S/p revision of the bypass with thrombectomy in 4/2022,  and was discharged Lovenox 1mg/kg/day.  Currently admitted with GIB.    1)Rectal bleed - s/p sigmoidoscopy - no active bleed seen  GI following  AC on hold    2)Peripheral vascular disease - recent left leg bypass graft thrombosed, s/p revision and thrombectomy in 4/2022   87M h/o GI bleed 2/2 AVMs, ESRD on HD (M/F), CAD, HTN, PPM ,  CHFpEF, arrythmia, HTN, HLD, CAD, a-fib and LLE DVT,  AS s/p TAVR, and PAD (RLE fem-pop bypass, revised LLE fem-pop bypass, recent hospitalization for left leg pvd , redness, and for left bypass graft thrombosed. S/p revision of the bypass with thrombectomy in 4/2022,  and was discharged Lovenox 1mg/kg/day.  Currently admitted with GIB.    1)Rectal bleed - s/p sigmoidoscopy - no active bleed seen  GI following  Okay to resume AC per GI    2)h/o LLE DVT + Peripheral vascular disease - recent left leg bypass graft thrombosed, s/p revision and thrombectomy in 4/2022  -Lengthy discussion with patient's vascular surgeon Dr. Sorenson.  -Given ESRD on HD, limited options in terms of AC.  -Most renally appropriate AC is eliquis or coumadin, and would prefer Eliquis given no need for INR monitoring  -He is high risk for bleeding regardless of which AC is used, but would not go back on Lovenox given clearance issues in ESRD/HD.  -spoke with daughter Vanessa and discussed above and she's agreeable to eliquis 2.5 mg BID. Dr. Sorenson was okay with that as well.   -d/w Dr. Lamas of ICU

## 2022-05-11 NOTE — DIETITIAN INITIAL EVALUATION ADULT - ORAL INTAKE PTA/DIET HISTORY
Pt seen at bedside for nutrition assessment.  Pt seen at bedside for nutrition assessment. Pt demonstrates fair po intake at meals; consumed ~50% at breakfast this morning. Pt reports intake has not changed PTA. Pt demonstrates muscle wasting/fat depletion. Encouraged PO and protein intake. Pt reports consuming nepro QD at home. Pt remains without complaints at this time.

## 2022-05-11 NOTE — CHART NOTE - NSCHARTNOTEFT_GEN_A_CORE
MICU    -updated  daughter   Vanessa Douglas   to clinical   condition   -   answered  all questions -- a waiting follow up from hematology and repeat cbc  after transfusion

## 2022-05-11 NOTE — PROGRESS NOTE ADULT - ASSESSMENT
ASSESSMENT: Patient is a 87y old male with multiple comorbidities, well known to vascular service with gi bleed, history of AVMs which bled previously on NOACs. curently on lovenox (held) for bypass with GI bleed.     PLAN:    - s/p GI endoscopy without visualization of source of bleed. f/u AM CBC then discuss with GI trial of AC  - Will need therapeutic lovenox on discharge.

## 2022-05-12 NOTE — PROGRESS NOTE ADULT - ASSESSMENT
At this time there is no active bleeding.  It should be kept in mind that his hemoglobin will vary according to his hemodialysis status and timing.  He should be kept on pantoprazole indefinitely in view of the prior history of erosive antral gastritis.  The current bout of bleeding was probably hemorrhoidal or due to proctitis while on anticoagulation or antiplatelet medication.  At this time there is no contraindication for him to be maintained on the Eliquis and monitored for any overt bleeding.   At this time there is no active bleeding.  It should be kept in mind that his hemoglobin will vary according to his hemodialysis status and timing. He last underwent a colonoscopy in June 2020 at which time there was some residual of the prior large polyp which was removed from the ascending colon.  There were left-sided diverticuli as well and moderate sized internal hemorrhoids. He should be kept on pantoprazole indefinitely in view of the prior history of erosive antral gastritis.  The current bout of bleeding was probably hemorrhoidal or due to proctitis or a limited diverticular bleed while on anticoagulation or antiplatelet medication.  At this time there is no contraindication for him to be maintained on the Eliquis and monitored for any overt bleeding.

## 2022-05-12 NOTE — PROGRESS NOTE ADULT - SUBJECTIVE AND OBJECTIVE BOX
Interval HPI:  - started on eliquis yesterday  - no bleeding, Hb stable  - no complaints    Exam:  alert and oriented, nad  MMM  reg rhythm  bilat air entry  abd soft  trace edema  distal pulse present in left foot    On Admission  05-09-22 (3d)  HPI:  87M h/o GI bleed 2/2 AVMs, ESRD on HD (M/F), CAD, HTN, PPM ,  CHFpEF, arrythmia, HTN, HLD, CAD, a-fib and LLE DVT,  AS s/p TAVR, and PAD (RLE fem-pop bypass, revised LLE fem-pop bypass, March 2021, CFA to AT with cryovein ) , recent hospitalization for left leg pvd , redness , and for left bypass graft  thrombosed. s/p revision of the bypass with thrombectomy. patient was dcd on  Lovenox 1mg/kg/day,  now presents with 3-4 episodes of BPBPR started last night , and was chano to er by daughter.   patient had another large BRBPR in er , RRT was called. sbp in 120s-150s. Micu consulted.      (09 May 2022 09:15)    PAST MEDICAL & SURGICAL HISTORY:  DM (diabetes mellitus)  - resolved      AV block, 1st degree      Arrhythmia      CAD (coronary artery disease)      HTN (hypertension)      VT (ventricular tachycardia)      RICHARDS (dyspnea on exertion)      Anemia      Risk factors for obstructive sleep apnea      ESRD on dialysis  HD on Mondays and Fridays      Aortic stenosis  - s/p valve replacement      GI bleeding  due to ulcerated polyps and colonic AVMs      H/O circumcision  at  age  65      H/O left knee surgery      H/O angioplasty  2013,  no  intervention      A-V fistula  left arm 5/2017      H/O carotid endarterectomy  Right      S/P TAVR (transcatheter aortic valve replacement)      History of loop recorder          Antimicrobial:    Cardiovascular:  isosorbide   mononitrate ER Tablet (IMDUR) 30 milliGRAM(s) Oral daily  tamsulosin 0.4 milliGRAM(s) Oral at bedtime  torsemide 20 milliGRAM(s) Oral daily    Pulmonary:    Hematalogic:  apixaban 2.5 milliGRAM(s) Oral every 12 hours    Other:  atorvastatin 80 milliGRAM(s) Oral at bedtime  cadexomer iodine 0.9% Gel 1 Application(s) Topical two times a day  chlorhexidine 2% Cloths 1 Application(s) Topical <User Schedule>  chlorhexidine 4% Liquid 1 Application(s) Topical <User Schedule>  hydrocortisone hemorrhoidal Suppository 1 Suppository(s) Rectal every 12 hours  HYDROmorphone  Injectable 0.5 milliGRAM(s) IV Push every 6 hours PRN  melatonin 3 milliGRAM(s) Oral at bedtime  multivitamin Oral Tab/Cap - Peds 1 Tablet(s) Oral daily  pantoprazole  Injectable 40 milliGRAM(s) IV Push every 12 hours  polyethylene glycol 3350 17 Gram(s) Oral every 12 hours  senna 2 Tablet(s) Oral at bedtime  sevelamer carbonate 800 milliGRAM(s) Oral three times a day      Drug Dosing Weight  Height (cm): 165.1 (09 May 2022 10:20)  Weight (kg): 66 (09 May 2022 10:20)  BMI (kg/m2): 24.2 (09 May 2022 10:20)  BSA (m2): 1.73 (09 May 2022 10:20)    T(C): 36.8 (05-12-22 @ 07:23), Max: 36.8 (05-11-22 @ 19:53)  HR: 76 (05-12-22 @ 08:00)  BP: 164/86 (05-12-22 @ 08:00)  BP(mean): 105 (05-12-22 @ 08:00)  ABP: --  ABP(mean): --  RR: 19 (05-12-22 @ 08:00)  SpO2: 97% (05-12-22 @ 08:00)          05-11 @ 07:01  -  05-12 @ 07:00  --------------------------------------------------------  IN: 760 mL / OUT: 502 mL / NET: 258 mL              LABS:  CBC Full  -  ( 12 May 2022 04:25 )  WBC Count : 5.80 K/uL  RBC Count : 3.58 M/uL  Hemoglobin : 11.2 g/dL  Hematocrit : 36.4 %  Platelet Count - Automated : 164 K/uL  Mean Cell Volume : 101.7 fl  Mean Cell Hemoglobin : 31.3 pg  Mean Cell Hemoglobin Concentration : 30.8 gm/dL  Auto Neutrophil # : x  Auto Lymphocyte # : x  Auto Monocyte # : x  Auto Eosinophil # : x  Auto Basophil # : x  Auto Neutrophil % : x  Auto Lymphocyte % : x  Auto Monocyte % : x  Auto Eosinophil % : x  Auto Basophil % : x    05-12    139  |  99  |  34.7<H>  ----------------------------<  92  4.0   |  25.0  |  3.22<H>    Ca    8.8      12 May 2022 04:25  Phos  2.9     05-12  Mg     2.0     05-12    TPro  5.6<L>  /  Alb  2.9<L>  /  TBili  0.4  /  DBili  x   /  AST  36  /  ALT  22  /  AlkPhos  203<H>  05-11    PT/INR - ( 12 May 2022 04:25 )   PT: 14.4 sec;   INR: 1.24 ratio         PTT - ( 12 May 2022 04:25 )  PTT:40.4 sec      ____________________________________________________________________________________________________

## 2022-05-12 NOTE — PATIENT PROFILE ADULT - NSPROGENBLOODRESTRICT_GEN_A_NUR
Varsha Drivers presents today for   Chief Complaint   Patient presents with    Follow-up     6 month follow up       Ineste Drivers preferred language for health care discussion is english/other. Is someone accompanying this pt? yes    Is the patient using any DME equipment during [address]? no    Depression Screening:  3 most recent PHQ Screens 5/12/2022   PHQ Not Done -   Little interest or pleasure in doing things Not at all   Feeling down, depressed, irritable, or hopeless Not at all   Total Score PHQ 2 0       Learning Assessment:  Learning Assessment 5/12/2022   PRIMARY LEARNER Patient   HIGHEST LEVEL OF EDUCATION - PRIMARY LEARNER  -   BARRIERS PRIMARY LEARNER -   [facility]    NEED -   LEARNER PREFERENCE PRIMARY DEMONSTRATION   LEARNING SPECIAL TOPICS -   ANSWERED BY patient   RELATIONSHIP SELF       Abuse Screening:  Abuse Screening Questionnaire 5/12/2022   Do you ever feel afraid of your partner? N   Are you in a relationship with someone who physically or mentally threatens you? N   Is it safe for you to go home? Y       Fall Risk  Fall Risk Assessment, last 12 mths 5/12/2022   Able to walk? Yes   Fall in past 12 months? 0   Do you feel unsteady? 0   Are you worried about falling 0           Pt currently taking Anticoagulant therapy? no    Pt currently taking Antiplatelet therapy ?  mg once a day      Coordination of Care:  1. Have you been to the ER, urgent care clinic since your last visit? Hospitalized since your last visit? no    2. Have you seen or consulted any other health care providers outside of the [address] [place] [address] Kevin since your last visit? Include any pap smears or colon screening.  no none

## 2022-05-12 NOTE — PROGRESS NOTE ADULT - SUBJECTIVE AND OBJECTIVE BOX
Interval HPI:    - On Eliquis ( ESRD Dose ) Now,   - No bleeding, Hgb.,  stable  - No complaints    Exam:    alert and oriented, Pale, NAD,   MMM  reg rhythm  bilat air entry  abd soft  trace edema  distal pulse present in left foot, Dry Gangrene,     On Admission  05-09-22 (3d)  HPI:    87M h/o GI bleed 2/2 AVMs, ESRD on HD (M/F), CAD, HTN, PPM ,  CHFpEF, arrythmia, HTN, HLD, CAD, a-fib and LLE DVT,  AS s/p TAVR, and PAD (RLE fem-pop bypass, revised LLE fem-pop bypass, March 2021, CFA to AT with cryovein ) , recent hospitalization for left leg pvd , redness , and for left bypass graft  thrombosed. s/p revision of the bypass with thrombectomy. patient was dcd on  Lovenox 1mg/kg/day,  now presents with 3-4 episodes of BPBPR started last night , and was chano to er by daughter.   patient had another large BRBPR in er , RRT was called. sbp in 120s-150s.      (09 May 2022 09:15)    PAST MEDICAL & SURGICAL HISTORY:    DM (diabetes mellitus)    AV block, 1st degree    Arrhythmia    CAD (coronary artery disease)    HTN (hypertension)    VT (ventricular tachycardia)    Anemia    Risk factors for obstructive sleep apnea      ESRD on dialysis  HD on Mondays and Fridays    Aortic stenosis  - s/p valve replacement    GI bleeding  due to ulcerated polyps and colonic AVMs    H/O left knee surgery    H/O angioplasty  2013,  no  intervention    A-V fistula  left arm 5/2017    H/O carotid endarterectomy  Right      S/P TAVR (transcatheter aortic valve replacement)    History of loop recorder    Cardiovascular:  isosorbide   mononitrate ER Tablet (IMDUR) 30 milliGRAM(s) Oral daily  tamsulosin 0.4 milliGRAM(s) Oral at bedtime  torsemide 20 milliGRAM(s) Oral daily    Hematalogic:  apixaban 2.5 milliGRAM(s) Oral every 12 hours    Other:  atorvastatin 80 milliGRAM(s) Oral at bedtime  cadexomer iodine 0.9% Gel 1 Application(s) Topical two times a day  chlorhexidine 2% Cloths 1 Application(s) Topical <User Schedule>  chlorhexidine 4% Liquid 1 Application(s) Topical <User Schedule>  hydrocortisone hemorrhoidal Suppository 1 Suppository(s) Rectal every 12 hours  HYDROmorphone  Injectable 0.5 milliGRAM(s) IV Push every 6 hours PRN  melatonin 3 milliGRAM(s) Oral at bedtime  multivitamin Oral Tab/Cap - Peds 1 Tablet(s) Oral daily  pantoprazole  Injectable 40 milliGRAM(s) IV Push every 12 hours  polyethylene glycol 3350 17 Gram(s) Oral every 12 hours  senna 2 Tablet(s) Oral at bedtime  sevelamer carbonate 800 milliGRAM(s) Oral three times a day    Drug Dosing Weight  Height (cm): 165.1 (09 May 2022 10:20)  Weight (kg): 66 (09 May 2022 10:20)  BMI (kg/m2): 24.2 (09 May 2022 10:20)  BSA (m2): 1.73 (09 May 2022 10:20)    T(C): 36.8 (05-12-22 @ 07:23), Max: 36.8 (05-11-22 @ 19:53)  HR: 76 (05-12-22 @ 08:00)  BP: 164/86 (05-12-22 @ 08:00)  BP(mean): 105 (05-12-22 @ 08:00)  RR: 19 (05-12-22 @ 08:00)  SpO2: 97% (05-12-22 @ 08:00)    05-11 @ 07:01  -  05-12 @ 07:00  --------------------------------------------------------  IN: 760 mL / OUT: 502 mL / NET: 258 mL    LABS:    CBC Full  -  ( 12 May 2022 04:25 )  WBC Count : 5.80 K/uL  RBC Count : 3.58 M/uL  Hemoglobin : 11.2 g/dL  Hematocrit : 36.4 %  Platelet Count - Automated : 164 K/uL  Mean Cell Volume : 101.7 fl  Mean Cell Hemoglobin : 31.3 pg  Mean Cell Hemoglobin Concentration : 30.8 gm/dL  Auto Neutrophil # : x  Auto Lymphocyte # : x  Auto Monocyte # : x  Auto Eosinophil # : x  Auto Basophil # : x  Auto Neutrophil % : x  Auto Lymphocyte % : x  Auto Monocyte % : x  Auto Eosinophil % : x  Auto Basophil % : x    05-12    139  |  99  |  34.7<H>  ----------------------------<  92  4.0   |  25.0  |  3.22<H>    Ca    8.8      12 May 2022 04:25  Phos  2.9     05-12  Mg     2.0     05-12    TPro  5.6<L>  /  Alb  2.9<L>  /  TBili  0.4  /  DBili  x   /  AST  36  /  ALT  22  /  AlkPhos  203<H>  05-11    PT/INR - ( 12 May 2022 04:25 )   PT: 14.4 sec;   INR: 1.24 ratio         PTT - ( 12 May 2022 04:25 )  PTT:40.4 sec  ____________________________________________________________________________________________________    88 yo male with ESRD on HD, HFpEF, CAD, Afib, HTN, LLE DVT, prior GI bleeding, AS s/p TAVR, PVD s/p recent left anterior tibial bypass in April 2022, discharged on lovenox, now admitted with GI bleeding, acute blood loss anemia.  Found to have rectal bleeding on CTA but no active bleeding during sigmoidoscopy.     Restarted on anticoagulation 5/11, seems to be tolerating it.    Plan    GI bleed/ rectal bleed: ( Colonic AVMs )     - PPI  - Hgb stable    PVD/ prior DVT  - Eliquis 2.5 mg BID ,       Afib  - restarted anticoagulation

## 2022-05-12 NOTE — PROGRESS NOTE ADULT - ASSESSMENT
DN - ESRD ,     Rectal Bleed,     S/P Revascularization - LLE,    HD in AM,  PRBC Transfusion PRN,     D/W MICU Team,

## 2022-05-12 NOTE — PROGRESS NOTE ADULT - ASSESSMENT
HEADACHE-A-AH       INCOMPLETE   87 year old male with history of GIB, Gastric Ulcer, AVMs, Chronic Anemia, PAD s/p Stent and recent Left Anterior Tibial Bypass in April 2022, Carotid Stenosis s/p CEA, CAD, HTN, PPM, AS s/p TAVR, Chronic Systolic Heart Failure and ESRD on HD twice a week (M/F) via LUE presented with bleeding per rectum. He was recently discharged on FD Lovenox after revision of bypass with thrombectomy on left leg. Admitted with GIB. He was Rapid Response just after admission due to AMS and active GIB. Upgraded to MICU. CTA Abd/Pelvis with active bleeding at the level of anal canal. He is s/p sigmoidoscopy, no evidence of active bleeding, shows signs of proctitis at the level of dentate line. He was transfused 2 units of PRBCs. Restarted on Eliquis on 5/11/22. No more bleeding. Stable to transfer to Medicine Service.     INCOMPLETE 87 year old male with history of GIB, Gastric Ulcer, AVMs, Chronic Anemia, PAD s/p Stent and recent Left Anterior Tibial Bypass in April 2022, Carotid Stenosis s/p CEA, CAD, HTN, PPM, AS s/p TAVR, Chronic Systolic Heart Failure and ESRD on HD twice a week (M/F) via LUE presented with bleeding per rectum. He was recently discharged on FD Lovenox after revision of bypass with thrombectomy on left leg. Admitted with GIB. He was Rapid Response just after admission due to AMS and active GIB. Upgraded to MICU. CTA Abd/Pelvis with active bleeding at the level of anal canal. He is s/p sigmoidoscopy, no evidence of active bleeding, shows signs of proctitis at the level of dentate line. He was transfused 2 units of PRBCs. Restarted on Eliquis on 5/11/22. No more bleeding. Stable to transfer to Medicine Service.     1) Acute Blood Loss Anemia  - Likely due to lower GIB (hemorrhoidal vs proctitis vs diverticular bleed)   - s/p Sigmoidoscopy: no evidence of active bleeding, shows signs of proctitis at the level of dentate line.   - s/p 2 units of PRBCs  - Avoid constipation   - GI follow up noted     2) History of Gastritis / Gastric Ulcer   - Continue Protonix   - GI follow up noted     INCOMPLETE 87 year old male with history of GIB, Gastric Ulcer, AVMs, Chronic Anemia, PAD s/p Stent and recent Left Anterior Tibial Bypass in April 2022, Carotid Stenosis s/p CEA, CAD, HTN, PPM, AS s/p TAVR, PAF, Chronic Systolic Heart Failure and ESRD on HD twice a week (M/F) via LUE presented with bleeding per rectum. He was recently discharged on FD Lovenox after revision of bypass with thrombectomy on left leg. Admitted with GIB. He was Rapid Response just after admission due to AMS and active GIB. Upgraded to MICU. CTA Abd/Pelvis with active bleeding at the level of anal canal. He is s/p sigmoidoscopy, no evidence of active bleeding, shows signs of proctitis at the level of dentate line. He was transfused 2 units of PRBCs. Restarted on Eliquis on 5/11/22. No more bleeding. Stable to transfer to Medicine Service.     1) Acute Blood Loss Anemia  - Likely due to lower GIB (hemorrhoidal vs proctitis vs diverticular bleed)   - s/p Sigmoidoscopy: no evidence of active bleeding, shows signs of proctitis at the level of dentate line.   - s/p 2 units of PRBCs  - Avoid constipation   - Continue Anusol suppositories   - Continue Senna and Miralax   - GI follow up noted   - Hem Consult noted     2) History of Gastritis / Gastric Ulcer   - Continue Protonix   - GI follow up noted     3) PAD   - s/p recent L fem to AT bypass with cryovein c/b acute graft rethrombosis s/p thrombectomy  - Continue Eliquis and Lipitor   - Vascular Surgery follow up noted     4) CAD / Carotid Stenosis / HLD / HTN  - Continue Lipitor and Imdur    5) PAF  - In NSR at this time  - Continue Eliquis   - Cardiology follow up noted     6) Chronic Diastolic Heart Failure  - Continue Torsemide    7) ESRD   - Continue dialysis (M/F)  - Nephrology following     DVT Prophylaxis -- Patient is on Eliquis.    Advance Directives: Full Code. Palliative Care following.     Dispo: [facility] vs Home with 24/7 care once stable.

## 2022-05-12 NOTE — PROGRESS NOTE ADULT - SUBJECTIVE AND OBJECTIVE BOX
[facility] - [facility], THE HEART [facility]                                   [address] [place] [address]                                                      PHONE: [phone]                                                         FAX: [phone]  [email or web address]  ---------------------------------------------------------------------------------------------------------------------------------    Overnight events/patient complaints:  no events  pt being restart on eliquis today       PAST MEDICAL & SURGICAL HISTORY:  DM (diabetes mellitus)  - resolved      AV block, 1st degree      Arrhythmia      CAD (coronary artery disease)      HTN (hypertension)      VT (ventricular tachycardia)      RICHARDS (dyspnea on exertion)      Anemia      Risk factors for obstructive sleep apnea      ESRD on dialysis  HD on Mondays and Fridays      Aortic stenosis  - s/p valve replacement      GI bleeding  due to ulcerated polyps and colonic AVMs      H/O circumcision  at  age  65      H/O left knee surgery      H/O angioplasty  2013,  no  intervention      A-V fistula  left arm 5/2017      H/O carotid endarterectomy  Right      S/P TAVR (transcatheter aortic valve replacement)      History of loop recorder          Plavix (Hives)  Toprol-XL (Rash)    MEDICATIONS  (STANDING):  apixaban 2.5 milliGRAM(s) Oral every 12 hours  atorvastatin 80 milliGRAM(s) Oral at bedtime  cadexomer iodine 0.9% Gel 1 Application(s) Topical two times a day  chlorhexidine 2% Cloths 1 Application(s) Topical <User Schedule>  chlorhexidine 4% Liquid 1 Application(s) Topical <User Schedule>  hydrocortisone hemorrhoidal Suppository 1 Suppository(s) Rectal every 12 hours  isosorbide   mononitrate ER Tablet (IMDUR) 30 milliGRAM(s) Oral daily  melatonin 3 milliGRAM(s) Oral at bedtime  multivitamin Oral Tab/Cap - Peds 1 Tablet(s) Oral daily  pantoprazole  Injectable 40 milliGRAM(s) IV Push every 12 hours  polyethylene glycol 3350 17 Gram(s) Oral every 12 hours  sevelamer carbonate 800 milliGRAM(s) Oral three times a day  tamsulosin 0.4 milliGRAM(s) Oral at bedtime  torsemide 20 milliGRAM(s) Oral daily    MEDICATIONS  (PRN):  HYDROmorphone  Injectable 0.5 milliGRAM(s) IV Push every 6 hours PRN Moderate Pain (4 - 6)      Vital Signs Last 24 Hrs  T(C): 36.8 (12 May 2022 07:23), Max: 36.8 (11 May 2022 19:53)  T(F): 98.2 (12 May 2022 07:23), Max: 98.3 (11 May 2022 23:00)  HR: 76 (12 May 2022 08:00) (58 - 76)  BP: 164/86 (12 May 2022 08:00) (86/72 - 166/46)  BP(mean): 105 (12 May 2022 08:00) (65 - 114)  RR: 19 (12 May 2022 08:00) (13 - 25)  SpO2: 97% (12 May 2022 08:00) (94% - 100%)  ICU Vital Signs Last 24 Hrs  CHRISTIANO ELIZABETH  I&O's Detail    11 May 2022 07:01  -  12 May 2022 07:00  --------------------------------------------------------  IN:    IV PiggyBack: 100 mL    Oral Fluid: 660 mL  Total IN: 760 mL    OUT:    Other (mL): 500 mL    Voided (mL): 2 mL  Total OUT: 502 mL    Total NET: 258 mL        I&O's Summary    11 May 2022 07:01  -  12 May 2022 07:00  --------------------------------------------------------  IN: 760 mL / OUT: 502 mL / NET: 258 mL      Drug Dosing Weight  CHRISTIANO ELIZABETH      PHYSICAL EXAM:  General: Appears well developed, well nourished alert and cooperative.  HEENT: Head; normocephalic, atraumatic.  Eyes: Pupils reactive, cornea wnl.  Neck: Supple, no nodes adenopathy, no NVD or carotid bruit or thyromegaly.  CARDIOVASCULAR: Normal S1 and S2, + murmur, rub, gallop or lift.   LUNGS: No rales, rhonchi or wheeze. Normal breath sounds bilaterally.  ABDOMEN: Soft, nontender without mass or organomegaly. bowel sounds normoactive.  EXTREMITIES: No clubbing, cyanosis or edema. Distal pulses wnl.   SKIN: warm and dry with normal turgor.  NEURO: Alert/oriented x 3/normal motor exam. No pathologic reflexes.    PSYCH: normal affect.        LABS:                        11.2   5.80  )-----------( 164      ( 12 May 2022 04:25 )             36.4     05-12    139  |  99  |  34.7<H>  ----------------------------<  92  4.0   |  25.0  |  3.22<H>    Ca    8.8      12 May 2022 04:25  Phos  2.9     05-12  Mg     2.0     05-12    TPro  5.6<L>  /  Alb  2.9<L>  /  TBili  0.4  /  DBili  x   /  AST  36  /  ALT  22  /  AlkPhos  203<H>  05-11    CHRISTIANO ELIZABETH      PT/INR - ( 12 May 2022 04:25 )   PT: 14.4 sec;   INR: 1.24 ratio         PTT - ( 12 May 2022 04:25 )  PTT:40.4 sec      RADIOLOGY & ADDITIONAL STUDIES:      87y Male with past medical history significant for ESRD on HD PAF off AC due to GIBs, leadless PPM, ILR in place, non obstructive CAD with normal EF, AS s/p TAVR, ESRD on HD, PAD s/p RLE fem-pop bypass, w/ revised LLE fem-bypass in march of 2021, complicated by LLE SFA and pop artery occlusion w/ DP and AT runoff s/p L fem to AT bypass w/ cryovein on 4/13/22.  Pt  was  d/c'ed  on Lovenox 1mg/kg/day for previous bypass occlusion.     MICU   Volume status stable on HD  lovenox 1mg/kg  trend cbcs     Thank you for allowing [facility] to participate in the care of this patient.  Please feel free to call with any questions or concerns.                  [facility] - [facility], [facility]                                   [address] [place] [address]                                                      PHONE: [phone]                                                         FAX: [phone]  [email or web address]  ---------------------------------------------------------------------------------------------------------------------------------    Overnight events/patient complaints:  no events  pt  restarted on eliquis       PAST MEDICAL & SURGICAL HISTORY:  DM (diabetes mellitus)  - resolved      AV block, 1st degree      Arrhythmia      CAD (coronary artery disease)      HTN (hypertension)      VT (ventricular tachycardia)      RICHARDS (dyspnea on exertion)      Anemia      Risk factors for obstructive sleep apnea      ESRD on dialysis  HD on Mondays and Fridays      Aortic stenosis  - s/p valve replacement      GI bleeding  due to ulcerated polyps and colonic AVMs      H/O circumcision  at  age  65      H/O left knee surgery      H/O angioplasty  2013,  no  intervention      A-V fistula  left arm 5/2017      H/O carotid endarterectomy  Right      S/P TAVR (transcatheter aortic valve replacement)      History of loop recorder          Plavix (Hives)  Toprol-XL (Rash)    MEDICATIONS  (STANDING):  apixaban 2.5 milliGRAM(s) Oral every 12 hours  atorvastatin 80 milliGRAM(s) Oral at bedtime  cadexomer iodine 0.9% Gel 1 Application(s) Topical two times a day  chlorhexidine 2% Cloths 1 Application(s) Topical <User Schedule>  chlorhexidine 4% Liquid 1 Application(s) Topical <User Schedule>  hydrocortisone hemorrhoidal Suppository 1 Suppository(s) Rectal every 12 hours  isosorbide   mononitrate ER Tablet (IMDUR) 30 milliGRAM(s) Oral daily  melatonin 3 milliGRAM(s) Oral at bedtime  multivitamin Oral Tab/Cap - Peds 1 Tablet(s) Oral daily  pantoprazole  Injectable 40 milliGRAM(s) IV Push every 12 hours  polyethylene glycol 3350 17 Gram(s) Oral every 12 hours  sevelamer carbonate 800 milliGRAM(s) Oral three times a day  tamsulosin 0.4 milliGRAM(s) Oral at bedtime  torsemide 20 milliGRAM(s) Oral daily    MEDICATIONS  (PRN):  HYDROmorphone  Injectable 0.5 milliGRAM(s) IV Push every 6 hours PRN Moderate Pain (4 - 6)      Vital Signs Last 24 Hrs  T(C): 36.8 (12 May 2022 07:23), Max: 36.8 (11 May 2022 19:53)  T(F): 98.2 (12 May 2022 07:23), Max: 98.3 (11 May 2022 23:00)  HR: 76 (12 May 2022 08:00) (58 - 76)  BP: 164/86 (12 May 2022 08:00) (86/72 - 166/46)  BP(mean): 105 (12 May 2022 08:00) (65 - 114)  RR: 19 (12 May 2022 08:00) (13 - 25)  SpO2: 97% (12 May 2022 08:00) (94% - 100%)  ICU Vital Signs Last 24 Hrs  CHRISTIANO ELIZABETH  I&O's Detail    11 May 2022 07:01  -  12 May 2022 07:00  --------------------------------------------------------  IN:    IV PiggyBack: 100 mL    Oral Fluid: 660 mL  Total IN: 760 mL    OUT:    Other (mL): 500 mL    Voided (mL): 2 mL  Total OUT: 502 mL    Total NET: 258 mL        I&O's Summary    11 May 2022 07:01  -  12 May 2022 07:00  --------------------------------------------------------  IN: 760 mL / OUT: 502 mL / NET: 258 mL      Drug Dosing Weight  CHRISTIANO ELIZABETH      PHYSICAL EXAM:  General: Appears well developed, well nourished alert and cooperative.  HEENT: Head; normocephalic, atraumatic.  Eyes: Pupils reactive, cornea wnl.  Neck: Supple, no nodes adenopathy, no NVD or carotid bruit or thyromegaly.  CARDIOVASCULAR: Normal S1 and S2, + murmur, rub, gallop or lift.   LUNGS: No rales, rhonchi or wheeze. Normal breath sounds bilaterally.  ABDOMEN: Soft, nontender without mass or organomegaly. bowel sounds normoactive.  EXTREMITIES: No clubbing, cyanosis or edema. Distal pulses wnl.   SKIN: warm and dry with normal turgor.  NEURO: Alert/oriented x 3/normal motor exam. No pathologic reflexes.    PSYCH: normal affect.        LABS:                        11.2   5.80  )-----------( 164      ( 12 May 2022 04:25 )             36.4     05-12    139  |  99  |  34.7<H>  ----------------------------<  92  4.0   |  25.0  |  3.22<H>    Ca    8.8      12 May 2022 04:25  Phos  2.9     05-12  Mg     2.0     05-12    TPro  5.6<L>  /  Alb  2.9<L>  /  TBili  0.4  /  DBili  x   /  AST  36  /  ALT  22  /  AlkPhos  203<H>  05-11    CHRISTIANO ELIZABETH      PT/INR - ( 12 May 2022 04:25 )   PT: 14.4 sec;   INR: 1.24 ratio         PTT - ( 12 May 2022 04:25 )  PTT:40.4 sec      RADIOLOGY & ADDITIONAL STUDIES:      87y Male with past medical history significant for ESRD on HD PAF off AC due to GIBs, leadless PPM, ILR in place, non obstructive CAD with normal EF, AS s/p TAVR, ESRD on HD, PAD s/p RLE fem-pop bypass, w/ revised LLE fem-bypass in march of 2021, complicated by LLE SFA and pop artery occlusion w/ DP and AT runoff s/p L fem to AT bypass w/ cryovein on 4/13/22.  Pt  was  d/c'ed  on Lovenox 1mg/kg/day for previous bypass occlusion.     MICU   Volume status stable on HD  pt restarted on eliquis   trend cbcs   gi following   ppi lifelong     Thank you for allowing [facility] to participate in the care of this patient.  Please feel free to call with any questions or concerns.

## 2022-05-12 NOTE — PROGRESS NOTE ADULT - SUBJECTIVE AND OBJECTIVE BOX
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    The patient underwent hemodialysis yesterday and this morning feels well and continues to deny any abdominal pain, nausea or vomiting.  He has no complaints.  He is uncertain as to whether or not he has had a bowel movement in the last 24 hours.  The nurses yesterday told me that he had a tarry stool overnight but I was able to perform a digital rectal exam this morning and he has light brown soft stool present in the rectal vault with no blood whatsoever.  He is now on Eliquis 2.5 mg twice daily which was started yesterday. Yesterday, prior to hemodialysis, the hemoglobin had drifted down somewhat to 8.5 probably due to hemodilution.  Nevertheless he was given 1 unit of packed red blood cells and the hemoglobin later yesterday was 10.7 and now is 11.2.      REVIEW OF SYSTEMS:    CONSTITUTIONAL: No fever, weight loss, or fatigue  EYES: No eye pain, visual disturbances, or discharge  ENMT:  No difficulty hearing, tinnitus, vertigo; No sinus or throat pain  RESPIRATORY: No cough, wheezing, chills or hemoptysis; No shortness of breath  CARDIOVASCULAR: No chest pain, palpitations, dizziness, or leg swelling  GASTROINTESTINAL: as above    MEDICATIONS:  MEDICATIONS  (STANDING):  apixaban 2.5 milliGRAM(s) Oral every 12 hours  atorvastatin 80 milliGRAM(s) Oral at bedtime  cadexomer iodine 0.9% Gel 1 Application(s) Topical two times a day  chlorhexidine 2% Cloths 1 Application(s) Topical <User Schedule>  chlorhexidine 4% Liquid 1 Application(s) Topical <User Schedule>  hydrocortisone hemorrhoidal Suppository 1 Suppository(s) Rectal every 12 hours  isosorbide   mononitrate ER Tablet (IMDUR) 30 milliGRAM(s) Oral daily  melatonin 3 milliGRAM(s) Oral at bedtime  multivitamin Oral Tab/Cap - Peds 1 Tablet(s) Oral daily  pantoprazole  Injectable 40 milliGRAM(s) IV Push every 12 hours  polyethylene glycol 3350 17 Gram(s) Oral every 12 hours  sevelamer carbonate 800 milliGRAM(s) Oral three times a day  tamsulosin 0.4 milliGRAM(s) Oral at bedtime  torsemide 20 milliGRAM(s) Oral daily    MEDICATIONS  (PRN):  HYDROmorphone  Injectable 0.5 milliGRAM(s) IV Push every 6 hours PRN Moderate Pain (4 - 6)      Allergies    Plavix (Hives)  Toprol-XL (Rash)    Intolerances        Vital Signs Last 24 Hrs  T(C): 36.8 (12 May 2022 07:23), Max: 36.8 (11 May 2022 19:53)  T(F): 98.2 (12 May 2022 07:23), Max: 98.3 (11 May 2022 23:00)  HR: 72 (12 May 2022 06:00) (58 - 76)  BP: 128/42 (12 May 2022 06:00) (86/72 - 166/46)  BP(mean): 68 (12 May 2022 06:00) (65 - 114)  RR: 19 (12 May 2022 06:00) (13 - 25)  SpO2: 100% (12 May 2022 06:00) (94% - 100%)    05-11 @ 07:01  -  05-12 @ 07:00  --------------------------------------------------------  IN: 760 mL / OUT: 502 mL / NET: 258 mL        PHYSICAL EXAM:    General: elderly male in no acute distress  HEENT: MMM, conjunctiva and sclera clear  Gastrointestinal:Abdomen: Soft non-tender non-distended; Normal bowel sounds; No hepatosplenomegaly  Extremities: no cyanosis, clubbing or edema.  Skin: Warm and dry. No obvious rash  ANDRE: light to medium brown soft stool with no blood    LABS:      CBC Full  -  ( 12 May 2022 04:25 )  WBC Count : 5.80 K/uL  RBC Count : 3.58 M/uL  Hemoglobin : 11.2 g/dL  Hematocrit : 36.4 %  Platelet Count - Automated : 164 K/uL  Mean Cell Volume : 101.7 fl  Mean Cell Hemoglobin : 31.3 pg  Mean Cell Hemoglobin Concentration : 30.8 gm/dL  Auto Neutrophil # : x  Auto Lymphocyte # : x  Auto Monocyte # : x  Auto Eosinophil # : x  Auto Basophil # : x  Auto Neutrophil % : x  Auto Lymphocyte % : x  Auto Monocyte % : x  Auto Eosinophil % : x  Auto Basophil % : x    05-12    139  |  99  |  34.7<H>  ----------------------------<  92  4.0   |  25.0  |  3.22<H>    Ca    8.8      12 May 2022 04:25  Phos  2.9     05-12  Mg     2.0     05-12    TPro  5.6<L>  /  Alb  2.9<L>  /  TBili  0.4  /  DBili  x   /  AST  36  /  ALT  22  /  AlkPhos  203<H>  05-11    PT/INR - ( 12 May 2022 04:25 )   PT: 14.4 sec;   INR: 1.24 ratio         PTT - ( 12 May 2022 04:25 )  PTT:40.4 sec

## 2022-05-12 NOTE — PROGRESS NOTE ADULT - ASSESSMENT
88 yo male with ESRD on HD, HFpEF, CAD, Afib, HTN, LLE DVT, prior GI bleeding, AS s/p TAVR, PVD s/p recent left anterior tibial bypass in April 2022, discharged on lovenox, now admitted with GI bleeding, acute blood loss anemia.  Found to have rectal bleeding on CTA but no active bleeding during sigmoidoscopy.   Restarted on anticoagulation 5/11, seems to be tolerating.  88 yo male with ESRD on HD, HFpEF, CAD, Afib, HTN, LLE DVT, prior GI bleeding, AS s/p TAVR, PVD s/p recent left anterior tibial bypass in April 2022, discharged on lovenox, now admitted with GI bleeding, acute blood loss anemia.  Found to have rectal bleeding on CTA but no active bleeding during sigmoidoscopy.   Restarted on anticoagulation 5/11, seems to be tolerating it.    Plan    GI bleed/ rectal bleed  - senna and miralax to avoid constipation/ hard stools  - PPI  - Hb stable    PVD/ prior DVT  - eliquis 2.5 mg BID started  - didn't tolerate antiplatelet in the past    Afib  - restarted anticoagulation    Vasoactive medications: none  DVT prophylaxis: eliquis  GI prophylaxis: protonix  Nutrition: diet ordered   Central line: none  Camarena:  none  Mobility: oob, PT ordered   Family/Patient engagement/GOC: patient and daughter updated at bedside, full code

## 2022-05-12 NOTE — PHARMACOTHERAPY INTERVENTION NOTE - COMMENTS
esrd lovenox not removed with dialysis
qtc 512 hold for now
EKG in am last qtc 512
qtc 542 was 512 hold cymbalta and trazodone

## 2022-05-12 NOTE — PROGRESS NOTE ADULT - SUBJECTIVE AND OBJECTIVE BOX
INTERVAL HPI/OVERNIGHT EVENTS:    Seen at bedside this morning no bloody BM overnight, restarted AC    MEDICATIONS  (STANDING):  apixaban 2.5 milliGRAM(s) Oral every 12 hours  atorvastatin 80 milliGRAM(s) Oral at bedtime  cadexomer iodine 0.9% Gel 1 Application(s) Topical two times a day  chlorhexidine 2% Cloths 1 Application(s) Topical <User Schedule>  chlorhexidine 4% Liquid 1 Application(s) Topical <User Schedule>  hydrocortisone hemorrhoidal Suppository 1 Suppository(s) Rectal every 12 hours  isosorbide   mononitrate ER Tablet (IMDUR) 30 milliGRAM(s) Oral daily  melatonin 3 milliGRAM(s) Oral at bedtime  multivitamin Oral Tab/Cap - Peds 1 Tablet(s) Oral daily  pantoprazole  Injectable 40 milliGRAM(s) IV Push every 12 hours  polyethylene glycol 3350 17 Gram(s) Oral every 12 hours  sevelamer carbonate 800 milliGRAM(s) Oral three times a day  tamsulosin 0.4 milliGRAM(s) Oral at bedtime  torsemide 20 milliGRAM(s) Oral daily    MEDICATIONS  (PRN):  HYDROmorphone  Injectable 0.5 milliGRAM(s) IV Push every 6 hours PRN Moderate Pain (4 - 6)        Vital Signs Last 24 Hrs  T(C): 36.8 (11 May 2022 23:00), Max: 36.8 (11 May 2022 19:53)  T(F): 98.3 (11 May 2022 23:00), Max: 98.3 (11 May 2022 23:00)  HR: 72 (12 May 2022 06:00) (58 - 76)  BP: 128/42 (12 May 2022 06:00) (86/72 - 166/46)  BP(mean): 68 (12 May 2022 06:00) (65 - 114)  RR: 19 (12 May 2022 06:00) (13 - 25)  SpO2: 100% (12 May 2022 06:00) (94% - 100%)      Physical Exam:  GEN: NAD, laying in bed, appears stated age  HEENT: NCAT, clear oral mucosa, normal conjunctiva  Chest: non-tender  CV:  non-tachycardic, 2+ radial pulse  Pulm: no increased work of breathing, no conversational dyspnea  GI: soft, non-tender  MSK: moving all extremities   Vasc: Palpable DP pulse 2+ bilaterally. Well healed groin incision. Left leg wounds with dressing in place, dry gangrene of toes. Healing wound of lateral leg    I&O's Detail    10 May 2022 07:01  -  11 May 2022 07:00  --------------------------------------------------------  IN:    Oral Fluid: 240 mL  Total IN: 240 mL    OUT:  Total OUT: 0 mL    Total NET: 240 mL                            8.5    5.43  )-----------( 178      ( 11 May 2022 03:44 )             27.6   05-11    138  |  98  |  57.7<H>  ----------------------------<  152<H>  3.8   |  23.0  |  3.95<H>    Ca    8.3<L>      11 May 2022 03:44  Phos  4.2     05-11  Mg     1.9     05-11    TPro  5.6<L>  /  Alb  2.9<L>  /  TBili  0.4  /  DBili  x   /  AST  36  /  ALT  22  /  AlkPhos  203<H>  05-11

## 2022-05-12 NOTE — PROGRESS NOTE ADULT - ASSESSMENT
ASSESSMENT: Patient is a 87y old male with multiple comorbidities, well known to vascular service with gi bleed, history of AVMs which bled previously on NOACs.  now restarted AC    PLAN:    - Observe hb on AC

## 2022-05-12 NOTE — PROGRESS NOTE ADULT - SUBJECTIVE AND OBJECTIVE BOX
TRANSFER NOTE / MICU DOWNGRADE    Gastrointestinal hemorrhage    HPI:  87M h/o GI bleed 2/2 AVMs, ESRD on HD (M/F), CAD, HTN, PPM ,  CHFpEF, arrythmia, HTN, HLD, CAD, a-fib and LLE DVT,  AS s/p TAVR, and PAD (RLE fem-pop bypass, revised LLE fem-pop bypass, March 2021, CFA to AT with cryovein ) , recent hospitalization for left leg pvd , redness , and for left bypass graft  thrombosed. s/p revision of the bypass with thrombectomy. patient was dcd on  Lovenox 1mg/kg/day,  now presents with 3-4 episodes of BPBPR started last night , and was chano to er by daughter.   patient had another large BRBPR in er , RRT was called. sbp in 120s-150s. Micu consulted. (09 May 2022 09:15)    Hospital Course:      Interval History:  Patient was seen and examined at bedside.   Feels better. Has chronic pain in left foot with paresthesia. Denies any change in it.   No more bleeding per rectum. Denies abdominal pain, nausea or vomiting.   Denies chest pain, palpitations, shortness of breath, headache, dizziness or visual symptoms.    ROS:  As per interval history otherwise unremarkable.    PHYSICAL EXAM:  Vital Signs   T(C): 36.6 (12 May 2022 11:25), Max: 36.8 (11 May 2022 19:53)  T(F): 97.9 (12 May 2022 11:25), Max: 98.3 (11 May 2022 23:00)  HR: 72 (12 May 2022 13:00) (58 - 78)  BP: 144/44 (12 May 2022 13:00) (86/72 - 166/46)  BP(mean): 70 (12 May 2022 13:00) (65 - 105)  RR: 13 (12 May 2022 13:00) (13 - 25)  SpO2: 100% (12 May 2022 13:00) (89% - 100%)  General: Elderly male lying in bed comfortably. No acute distress  HEENT: PERRLA. EOMI. Clear conjunctivae. Moist mucus membrane  Neck: Supple.   Chest: Good air entry. No wheezing, rales or rhonchi. No chest wall tenderness.  Heart: Normal S1 & S2. RRR. Systolic murmur.   Abdomen: Soft. Non-tender. Non-distended. + BS  Ext: No pedal edema. No calf tenderness. Surgical wounds on LLE -- healthy, no signs of infection. Dressing on lateral aspect of shin. AVF in LUE.    Neuro: Active and alert. No focal deficit. No speech disorder  Skin: Warm and Dry  Psychiatry: Normal mood and affect    I&O's Summary    11 May 2022 07:01  -  12 May 2022 07:00  --------------------------------------------------------  IN: 760 mL / OUT: 502 mL / NET: 258 mL    LABS:                        11.2   5.80  )-----------( 164      ( 12 May 2022 04:25 )             36.4     05-12    139  |  99  |  34.7<H>  ----------------------------<  92  4.0   |  25.0  |  3.22<H>    Ca    8.8      12 May 2022 04:25  Phos  2.9     05-12  Mg     2.0     05-12    TPro  5.6<L>  /  Alb  2.9<L>  /  TBili  0.4  /  DBili  x   /  AST  36  /  ALT  22  /  AlkPhos  203<H>  05-11    PT/INR - ( 12 May 2022 04:25 )   PT: 14.4 sec;   INR: 1.24 ratio       PTT - ( 12 May 2022 04:25 )  PTT:40.4 sec    RADIOLOGY & ADDITIONAL STUDIES:  Reviewed     MEDICATIONS  (STANDING):  apixaban 2.5 milliGRAM(s) Oral every 12 hours  atorvastatin 80 milliGRAM(s) Oral at bedtime  cadexomer iodine 0.9% Gel 1 Application(s) Topical two times a day  chlorhexidine 2% Cloths 1 Application(s) Topical <User Schedule>  chlorhexidine 4% Liquid 1 Application(s) Topical <User Schedule>  hydrocortisone hemorrhoidal Suppository 1 Suppository(s) Rectal every 12 hours  isosorbide   mononitrate ER Tablet (IMDUR) 30 milliGRAM(s) Oral daily  melatonin 3 milliGRAM(s) Oral at bedtime  multivitamin Oral Tab/Cap - Peds 1 Tablet(s) Oral daily  pantoprazole  Injectable 40 milliGRAM(s) IV Push every 12 hours  polyethylene glycol 3350 17 Gram(s) Oral every 12 hours  senna 2 Tablet(s) Oral at bedtime  sevelamer carbonate 800 milliGRAM(s) Oral three times a day  tamsulosin 0.4 milliGRAM(s) Oral at bedtime  torsemide 20 milliGRAM(s) Oral daily    MEDICATIONS  (PRN):  HYDROmorphone  Injectable 0.5 milliGRAM(s) IV Push every 6 hours PRN Moderate Pain (4 - 6)       TRANSFER NOTE / MICU DOWNGRADE    Gastrointestinal hemorrhage    HPI:  87M h/o GI bleed 2/2 AVMs, ESRD on HD (M/F), CAD, HTN, PPM ,  CHFpEF, arrythmia, HTN, HLD, CAD, a-fib and LLE DVT,  AS s/p TAVR, and PAD (RLE fem-pop bypass, revised LLE fem-pop bypass, March 2021, CFA to AT with cryovein ) , recent hospitalization for left leg pvd , redness , and for left bypass graft  thrombosed. s/p revision of the bypass with thrombectomy. patient was dcd on  Lovenox 1mg/kg/day,  now presents with 3-4 episodes of BPBPR started last night , and was chano to er by daughter.   patient had another large BRBPR in er , RRT was called. sbp in 120s-150s. Micu consulted. (09 May 2022 09:15)    Hospital Course:  87 year old male with history of GIB, Gastric Ulcer, AVMs, Chronic Anemia, PAD s/p Stent and recent Left Anterior Tibial Bypass in April 2022, Carotid Stenosis s/p CEA, CAD, HTN, PPM, AS s/p TAVR, Chronic Systolic Heart Failure and ESRD on HD twice a week (M/F) via LUE presented with bleeding per rectum. He was recently discharged on FD Lovenox after revision of bypass with thrombectomy on left leg. Admitted with GIB. He was Rapid Response just after admission due to AMS and active GIB. Upgraded to MICU. CTA Abd/Pelvis with active bleeding at the level of anal canal. He is s/p sigmoidoscopy, no evidence of active bleeding, shows signs of proctitis at the level of dentate line. He was transfused 2 units of PRBCs. Restarted on Eliquis on 5/11/22. No more bleeding. Stable to transfer to Medicine Service.     Interval History:  Patient was seen and examined at bedside.   Feels better. Has chronic pain in left foot with paresthesia. Denies any change in it.   No more bleeding per rectum. Denies abdominal pain, nausea or vomiting.   Denies chest pain, palpitations, shortness of breath, headache, dizziness or visual symptoms.    ROS:  As per interval history otherwise unremarkable.    PHYSICAL EXAM:  Vital Signs   T(C): 36.6 (12 May 2022 11:25), Max: 36.8 (11 May 2022 19:53)  T(F): 97.9 (12 May 2022 11:25), Max: 98.3 (11 May 2022 23:00)  HR: 72 (12 May 2022 13:00) (58 - 78)  BP: 144/44 (12 May 2022 13:00) (86/72 - 166/46)  BP(mean): 70 (12 May 2022 13:00) (65 - 105)  RR: 13 (12 May 2022 13:00) (13 - 25)  SpO2: 100% (12 May 2022 13:00) (89% - 100%)  General: Elderly male lying in bed comfortably. No acute distress  HEENT: PERRLA. EOMI. Clear conjunctivae. Moist mucus membrane  Neck: Supple.   Chest: Good air entry. No wheezing, rales or rhonchi. No chest wall tenderness.  Heart: Normal S1 & S2. RRR. Systolic murmur.   Abdomen: Soft. Non-tender. Non-distended. + BS  Ext: No pedal edema. No calf tenderness. Surgical wounds on LLE -- healthy, no signs of infection. Dressing on lateral aspect of shin. AVF in LUE.    Neuro: Active and alert. No focal deficit. No speech disorder  Skin: Warm and Dry  Psychiatry: Normal mood and affect    I&O's Summary    11 May 2022 07:01  -  12 May 2022 07:00  --------------------------------------------------------  IN: 760 mL / OUT: 502 mL / NET: 258 mL    LABS:                        11.2   5.80  )-----------( 164      ( 12 May 2022 04:25 )             36.4     05-12    139  |  99  |  34.7<H>  ----------------------------<  92  4.0   |  25.0  |  3.22<H>    Ca    8.8      12 May 2022 04:25  Phos  2.9     05-12  Mg     2.0     05-12    TPro  5.6<L>  /  Alb  2.9<L>  /  TBili  0.4  /  DBili  x   /  AST  36  /  ALT  22  /  AlkPhos  203<H>  05-11    PT/INR - ( 12 May 2022 04:25 )   PT: 14.4 sec;   INR: 1.24 ratio       PTT - ( 12 May 2022 04:25 )  PTT:40.4 sec    RADIOLOGY & ADDITIONAL STUDIES:  Reviewed     MEDICATIONS  (STANDING):  apixaban 2.5 milliGRAM(s) Oral every 12 hours  atorvastatin 80 milliGRAM(s) Oral at bedtime  cadexomer iodine 0.9% Gel 1 Application(s) Topical two times a day  chlorhexidine 2% Cloths 1 Application(s) Topical <User Schedule>  chlorhexidine 4% Liquid 1 Application(s) Topical <User Schedule>  hydrocortisone hemorrhoidal Suppository 1 Suppository(s) Rectal every 12 hours  isosorbide   mononitrate ER Tablet (IMDUR) 30 milliGRAM(s) Oral daily  melatonin 3 milliGRAM(s) Oral at bedtime  multivitamin Oral Tab/Cap - Peds 1 Tablet(s) Oral daily  pantoprazole  Injectable 40 milliGRAM(s) IV Push every 12 hours  polyethylene glycol 3350 17 Gram(s) Oral every 12 hours  senna 2 Tablet(s) Oral at bedtime  sevelamer carbonate 800 milliGRAM(s) Oral three times a day  tamsulosin 0.4 milliGRAM(s) Oral at bedtime  torsemide 20 milliGRAM(s) Oral daily    MEDICATIONS  (PRN):  HYDROmorphone  Injectable 0.5 milliGRAM(s) IV Push every 6 hours PRN Moderate Pain (4 - 6)       TRANSFER NOTE / MICU DOWNGRADE    Gastrointestinal hemorrhage    HPI:  87M h/o GI bleed 2/2 AVMs, ESRD on HD (M/F), CAD, HTN, PPM ,  CHFpEF, arrythmia, HTN, HLD, CAD, a-fib and LLE DVT,  AS s/p TAVR, and PAD (RLE fem-pop bypass, revised LLE fem-pop bypass, March 2021, CFA to AT with cryovein ) , recent hospitalization for left leg pvd , redness , and for left bypass graft  thrombosed. s/p revision of the bypass with thrombectomy. patient was dcd on  Lovenox 1mg/kg/day,  now presents with 3-4 episodes of BPBPR started last night , and was chano to er by daughter.   patient had another large BRBPR in er , RRT was called. sbp in 120s-150s. Micu consulted. (09 May 2022 09:15)    Hospital Course:  87 year old male with history of GIB, Gastric Ulcer, AVMs, Chronic Anemia, PAD s/p Stent and recent Left Anterior Tibial Bypass in April 2022, Carotid Stenosis s/p CEA, CAD, HTN, PPM, AS s/p TAVR, PAF, Chronic Systolic Heart Failure and ESRD on HD twice a week (M/F) via LUE presented with bleeding per rectum. He was recently discharged on FD Lovenox after revision of bypass with thrombectomy on left leg. Admitted with GIB. He was Rapid Response just after admission due to AMS and active GIB. Upgraded to MICU. CTA Abd/Pelvis with active bleeding at the level of anal canal. He is s/p sigmoidoscopy, no evidence of active bleeding, shows signs of proctitis at the level of dentate line. He was transfused 2 units of PRBCs. Restarted on Eliquis on 5/11/22. No more bleeding. Stable to transfer to Medicine Service.     Interval History:  Patient was seen and examined at bedside.   Feels better. Has chronic pain in left foot with paresthesia. Denies any change in it.   No more bleeding per rectum. Denies abdominal pain, nausea or vomiting.   Denies chest pain, palpitations, shortness of breath, headache, dizziness or visual symptoms.    ROS:  As per interval history otherwise unremarkable.    PHYSICAL EXAM:  Vital Signs   T(C): 36.6 (12 May 2022 11:25), Max: 36.8 (11 May 2022 19:53)  T(F): 97.9 (12 May 2022 11:25), Max: 98.3 (11 May 2022 23:00)  HR: 72 (12 May 2022 13:00) (58 - 78)  BP: 144/44 (12 May 2022 13:00) (86/72 - 166/46)  BP(mean): 70 (12 May 2022 13:00) (65 - 105)  RR: 13 (12 May 2022 13:00) (13 - 25)  SpO2: 100% (12 May 2022 13:00) (89% - 100%)  General: Elderly male lying in bed comfortably. No acute distress  HEENT: PERRLA. EOMI. Clear conjunctivae. Moist mucus membrane  Neck: Supple.   Chest: Good air entry. No wheezing, rales or rhonchi. No chest wall tenderness.  Heart: Normal S1 & S2. RRR. Systolic murmur.   Abdomen: Soft. Non-tender. Non-distended. + BS  Ext: No pedal edema. No calf tenderness. Surgical wounds on LLE -- healthy, no signs of infection. Dressing on lateral aspect of shin. AVF in LUE.    Neuro: Active and alert. No focal deficit. No speech disorder  Skin: Warm and Dry  Psychiatry: Normal mood and affect    I&O's Summary    11 May 2022 07:01  -  12 May 2022 07:00  --------------------------------------------------------  IN: 760 mL / OUT: 502 mL / NET: 258 mL    LABS:                        11.2   5.80  )-----------( 164      ( 12 May 2022 04:25 )             36.4     05-12    139  |  99  |  34.7<H>  ----------------------------<  92  4.0   |  25.0  |  3.22<H>    Ca    8.8      12 May 2022 04:25  Phos  2.9     05-12  Mg     2.0     05-12    TPro  5.6<L>  /  Alb  2.9<L>  /  TBili  0.4  /  DBili  x   /  AST  36  /  ALT  22  /  AlkPhos  203<H>  05-11    PT/INR - ( 12 May 2022 04:25 )   PT: 14.4 sec;   INR: 1.24 ratio       PTT - ( 12 May 2022 04:25 )  PTT:40.4 sec    RADIOLOGY & ADDITIONAL STUDIES:  Reviewed     MEDICATIONS  (STANDING):  apixaban 2.5 milliGRAM(s) Oral every 12 hours  atorvastatin 80 milliGRAM(s) Oral at bedtime  cadexomer iodine 0.9% Gel 1 Application(s) Topical two times a day  chlorhexidine 2% Cloths 1 Application(s) Topical <User Schedule>  chlorhexidine 4% Liquid 1 Application(s) Topical <User Schedule>  hydrocortisone hemorrhoidal Suppository 1 Suppository(s) Rectal every 12 hours  isosorbide   mononitrate ER Tablet (IMDUR) 30 milliGRAM(s) Oral daily  melatonin 3 milliGRAM(s) Oral at bedtime  multivitamin Oral Tab/Cap - Peds 1 Tablet(s) Oral daily  pantoprazole  Injectable 40 milliGRAM(s) IV Push every 12 hours  polyethylene glycol 3350 17 Gram(s) Oral every 12 hours  senna 2 Tablet(s) Oral at bedtime  sevelamer carbonate 800 milliGRAM(s) Oral three times a day  tamsulosin 0.4 milliGRAM(s) Oral at bedtime  torsemide 20 milliGRAM(s) Oral daily    MEDICATIONS  (PRN):  HYDROmorphone  Injectable 0.5 milliGRAM(s) IV Push every 6 hours PRN Moderate Pain (4 - 6)

## 2022-05-13 NOTE — PROGRESS NOTE ADULT - SUBJECTIVE AND OBJECTIVE BOX
[place] [facility] - [facility], [facility]                                   [address] [place] [address]                                                      PHONE: [phone]                                                         FAX: [phone]  [email or web address]  ---------------------------------------------------------------------------------------------------------------------------------    Overnight events/patient complaints:      PAST MEDICAL & SURGICAL HISTORY:  DM (diabetes mellitus)  - resolved      AV block, 1st degree      Arrhythmia      CAD (coronary artery disease)      HTN (hypertension)      VT (ventricular tachycardia)      RICHARDS (dyspnea on exertion)      Anemia      Risk factors for obstructive sleep apnea      ESRD on dialysis  HD on Mondays and Fridays      Aortic stenosis  - s/p valve replacement      GI bleeding  due to ulcerated polyps and colonic AVMs      H/O circumcision  at  age  65      H/O left knee surgery      H/O angioplasty  2013,  no  intervention      A-V fistula  left arm 5/2017      H/O carotid endarterectomy  Right      S/P TAVR (transcatheter aortic valve replacement)      History of loop recorder          Plavix (Hives)  Toprol-XL (Rash)    MEDICATIONS  (STANDING):  apixaban 2.5 milliGRAM(s) Oral every 12 hours  atorvastatin 80 milliGRAM(s) Oral at bedtime  cadexomer iodine 0.9% Gel 1 Application(s) Topical two times a day  chlorhexidine 2% Cloths 1 Application(s) Topical <User Schedule>  chlorhexidine 4% Liquid 1 Application(s) Topical <User Schedule>  hydrocortisone hemorrhoidal Suppository 1 Suppository(s) Rectal every 12 hours  isosorbide   mononitrate ER Tablet (IMDUR) 30 milliGRAM(s) Oral daily  melatonin 3 milliGRAM(s) Oral at bedtime  multivitamin Oral Tab/Cap - Peds 1 Tablet(s) Oral daily  pantoprazole  Injectable 40 milliGRAM(s) IV Push every 12 hours  polyethylene glycol 3350 17 Gram(s) Oral every 12 hours  senna 2 Tablet(s) Oral at bedtime  sevelamer carbonate 800 milliGRAM(s) Oral three times a day  tamsulosin 0.4 milliGRAM(s) Oral at bedtime  torsemide 20 milliGRAM(s) Oral daily    MEDICATIONS  (PRN):  acetaminophen     Tablet .. 650 milliGRAM(s) Oral every 6 hours PRN Temp greater or equal to 38C (100.4F), Mild Pain (1 - 3), Moderate Pain (4 - 6)  HYDROmorphone   Tablet 2 milliGRAM(s) Oral every 6 hours PRN Severe Pain (7 - 10)      Vital Signs Last 24 Hrs  T(C): 36.5 (13 May 2022 08:06), Max: 36.7 (12 May 2022 16:00)  T(F): 97.7 (13 May 2022 08:06), Max: 98.1 (12 May 2022 16:00)  HR: 71 (13 May 2022 08:06) (61 - 78)  BP: 159/68 (13 May 2022 08:06) (121/67 - 160/76)  BP(mean): 90 (12 May 2022 19:00) (61 - 92)  RR: 18 (13 May 2022 08:06) (13 - 22)  SpO2: 95% (13 May 2022 08:06) (89% - 100%)  ICU Vital Signs Last 24 Hrs  CHRISTIANO ELIZABETH  I&O's Detail    12 May 2022 07:01  -  13 May 2022 07:00  --------------------------------------------------------  IN:    Oral Fluid: 600 mL  Total IN: 600 mL    OUT:  Total OUT: 0 mL    Total NET: 600 mL        I&O's Summary    12 May 2022 07:01  -  13 May 2022 07:00  --------------------------------------------------------  IN: 600 mL / OUT: 0 mL / NET: 600 mL      Drug Dosing Weight  CHRISTIANO ELIZABETH      PHYSICAL EXAM:  General: Appears well developed, well nourished alert and cooperative.  HEENT: Head; normocephalic, atraumatic.  Eyes: Pupils reactive, cornea wnl.  Neck: Supple, no nodes adenopathy, no NVD or carotid bruit or thyromegaly.  CARDIOVASCULAR: Normal S1 and S2, No murmur, rub, gallop or lift.   LUNGS: No rales, rhonchi or wheeze. Normal breath sounds bilaterally.  ABDOMEN: Soft, nontender without mass or organomegaly. bowel sounds normoactive.  EXTREMITIES: No clubbing, cyanosis or edema. Distal pulses wnl.   SKIN: warm and dry with normal turgor.  NEURO: Alert/oriented x 3/normal motor exam. No pathologic reflexes.    PSYCH: normal affect.        LABS:                        11.2   5.80  )-----------( 164      ( 12 May 2022 04:25 )             36.4     05-12    139  |  99  |  34.7<H>  ----------------------------<  92  4.0   |  25.0  |  3.22<H>    Ca    8.8      12 May 2022 04:25  Phos  2.9     05-12  Mg     2.0     05-12      CHRISTIANO ELIZABETH      PT/INR - ( 12 May 2022 04:25 )   PT: 14.4 sec;   INR: 1.24 ratio         PTT - ( 12 May 2022 04:25 )  PTT:40.4 sec        87y Male with past medical history significant for ESRD on HD PAF off AC due to GIBs, leadless PPM, ILR in place, non obstructive CAD with normal EF, AS s/p TAVR, ESRD on HD, PAD s/p RLE fem-pop bypass, w/ revised LLE fem-bypass in march of 2021, complicated by LLE SFA and pop artery occlusion w/ DP and AT runoff s/p L fem to AT bypass w/ cryovein on 4/13/22.  Pt  was  d/c'ed  on Lovenox 1mg/kg/day for previous bypass occlusion.     MICU   Volume status stable on HD  pt restarted on eliquis   trend cbcs - has been stable for now   gi following   ppi lifelong     Thank you for allowing [facility] to participate in the care of this patient.  Please feel free to call with any questions or concerns.

## 2022-05-13 NOTE — PROGRESS NOTE ADULT - TIME BILLING
review of notes from vascular surgery and cardiology, and medical team reagridng restarting blood thinning medicines     chart review, lab review, imaging review, notes review. Discussion with  and coordination of care with all relevant providers and consultants caring for patient.
chart review, lab review, imaging review, notes review. Discussion with  and coordination of care with all relevant providers and consultants caring for patient. discussion with Dr. Lamas regarding gastrointestinal bleeding and restarting of AC
coordinating care with MICU PA/resident, MICU RN, counseling patient and daughter.
I spoke to daughter Vanessa, ICU attending, bedside nurse and GI NP  labs, notes, CTA and flex sig notes reviewed

## 2022-05-13 NOTE — PROGRESS NOTE ADULT - SUBJECTIVE AND OBJECTIVE BOX
INTERVAL HPI/OVERNIGHT EVENTS:    CC: acute blood loss anemia, ESRD on HD, atrial fibrillation, CAD, CHF    Chart and course reviewed.  Epistaxis this am, now resolved  denies complaints  received HD    Vital Signs Last 24 Hrs  T(C): 36.7 (13 May 2022 13:13), Max: 36.8 (13 May 2022 09:36)  T(F): 98 (13 May 2022 13:13), Max: 98.3 (13 May 2022 09:36)  HR: 57 (13 May 2022 13:13) (57 - 73)  BP: 169/81 (13 May 2022 13:13) (128/58 - 169/81)  BP(mean): 90 (12 May 2022 19:00) (61 - 92)  RR: 18 (13 May 2022 13:13) (15 - 20)  SpO2: 100% (13 May 2022 13:13) (95% - 100%)    PHYSICAL EXAM:    GENERAL: alert, not in distress  CHEST/LUNG: b/l air entry  HEART: reg  ABDOMEN: soft, non tender, bs+  EXTREMITIES:  left shin linear wound, small hematoma, non tender left upper thigh, sutures over left groin, no bleeding noted.    MEDICATIONS  (STANDING):  apixaban 2.5 milliGRAM(s) Oral every 12 hours  atorvastatin 80 milliGRAM(s) Oral at bedtime  cadexomer iodine 0.9% Gel 1 Application(s) Topical two times a day  chlorhexidine 2% Cloths 1 Application(s) Topical <User Schedule>  chlorhexidine 4% Liquid 1 Application(s) Topical <User Schedule>  hydrocortisone hemorrhoidal Suppository 1 Suppository(s) Rectal every 12 hours  isosorbide   mononitrate ER Tablet (IMDUR) 30 milliGRAM(s) Oral daily  melatonin 3 milliGRAM(s) Oral at bedtime  multivitamin Oral Tab/Cap - Peds 1 Tablet(s) Oral daily  pantoprazole  Injectable 40 milliGRAM(s) IV Push every 12 hours  polyethylene glycol 3350 17 Gram(s) Oral every 12 hours  senna 2 Tablet(s) Oral at bedtime  sevelamer carbonate 800 milliGRAM(s) Oral three times a day  tamsulosin 0.4 milliGRAM(s) Oral at bedtime  torsemide 20 milliGRAM(s) Oral daily    MEDICATIONS  (PRN):  acetaminophen     Tablet .. 650 milliGRAM(s) Oral every 6 hours PRN Temp greater or equal to 38C (100.4F), Mild Pain (1 - 3), Moderate Pain (4 - 6)  HYDROmorphone   Tablet 2 milliGRAM(s) Oral every 6 hours PRN Severe Pain (7 - 10)      Allergies    Plavix (Hives)  Toprol-XL (Rash)    Intolerances          LABS:                          10.4   5.64  )-----------( 156      ( 13 May 2022 09:47 )             33.4     05-13    137  |  97<L>  |  44.9<H>  ----------------------------<  96  4.5   |  23.0  |  3.97<H>    Ca    8.8      13 May 2022 09:47  Phos  3.5     05-13  Mg     2.0     05-13      PT/INR - ( 12 May 2022 04:25 )   PT: 14.4 sec;   INR: 1.24 ratio         PTT - ( 12 May 2022 04:25 )  PTT:40.4 sec      RADIOLOGY & ADDITIONAL TESTS:

## 2022-05-13 NOTE — PROGRESS NOTE ADULT - SUBJECTIVE AND OBJECTIVE BOX
Vital Signs Last 24 Hrs  T(C): 36.8 (13 May 2022 09:36), Max: 36.8 (13 May 2022 09:36)  T(F): 98.3 (13 May 2022 09:36), Max: 98.3 (13 May 2022 09:36)  HR: 73 (13 May 2022 09:36) (61 - 73)  BP: 156/88 (13 May 2022 09:36) (128/58 - 160/76)  BP(mean): 90 (12 May 2022 19:00) (61 - 92)  RR: 18 (13 May 2022 09:36) (13 - 22)  SpO2: 98% (13 May 2022 09:36) (89% - 100%)  HD today. No symptoms. Hemodynamics stable. Tolerating dialysis and ultrafiltration.  Pre Laboratory values personally reviewed by me.  Dialysis adjusted appropriately based on current values.  Will continue the current medical management.  Next hemodialysis as scheduled.  Discussed with nursing, primary care team.

## 2022-05-13 NOTE — PROGRESS NOTE ADULT - SUBJECTIVE AND OBJECTIVE BOX
Chief Complaint: This is a 87y old man patient being seen in follow-up consultation for rectal bleed.     Interval HPI / 24H events: Patient seen and evaluated at bedside, reporting no complaints, no overnight events. His hemoglobin 10. 4 today, down from 11.2 gm yesterday. HD nurse at the bedside, getting ready to start HD at the bedside. Noted scant epistaxis.  Patient denies abdominal pain, nausea, vomiting, hematemesis, dizziness, chest pain.                                                     .    Review of Systems:  · ENMT: negative  · Respiratory and Thorax: negative  · Cardiovascular: negative  · Gastrointestinal: see above.  · Genitourinary:	negative  · Musculoskeletal: negative  · Neurological: negative  · Psychiatric: negative  · Hematology/Lymphatics: negative  · Endocrine: negative      PAST MEDICAL/SURGICAL HISTORY:  DM (diabetes mellitus)  - resolved    AV block, 1st degree    Arrhythmia    CAD (coronary artery disease)    HTN (hypertension)    VT (ventricular tachycardia)    RICHARDS (dyspnea on exertion)    Anemia    Risk factors for obstructive sleep apnea    ESRD on dialysis  HD on Mondays and Fridays    Aortic stenosis  - s/p valve replacement    GI bleeding  due to ulcerated polyps and colonic AVMs    H/O circumcision  at  age  65    H/O left knee surgery    H/O angioplasty  2013,  no  intervention    A-V fistula  left arm 5/2017    H/O carotid endarterectomy  Right    S/P TAVR (transcatheter aortic valve replacement)    History of loop recorder      MEDICATIONS  (STANDING):  apixaban 2.5 milliGRAM(s) Oral every 12 hours  atorvastatin 80 milliGRAM(s) Oral at bedtime  cadexomer iodine 0.9% Gel 1 Application(s) Topical two times a day  chlorhexidine 2% Cloths 1 Application(s) Topical <User Schedule>  chlorhexidine 4% Liquid 1 Application(s) Topical <User Schedule>  epoetin juan-epbx (RETACRIT) Injectable 61623 Unit(s) IV Push once  hydrocortisone hemorrhoidal Suppository 1 Suppository(s) Rectal every 12 hours  isosorbide   mononitrate ER Tablet (IMDUR) 30 milliGRAM(s) Oral daily  melatonin 3 milliGRAM(s) Oral at bedtime  multivitamin Oral Tab/Cap - Peds 1 Tablet(s) Oral daily  pantoprazole  Injectable 40 milliGRAM(s) IV Push every 12 hours  polyethylene glycol 3350 17 Gram(s) Oral every 12 hours  senna 2 Tablet(s) Oral at bedtime  sevelamer carbonate 800 milliGRAM(s) Oral three times a day  tamsulosin 0.4 milliGRAM(s) Oral at bedtime  torsemide 20 milliGRAM(s) Oral daily    MEDICATIONS  (PRN):  acetaminophen     Tablet .. 650 milliGRAM(s) Oral every 6 hours PRN Temp greater or equal to 38C (100.4F), Mild Pain (1 - 3), Moderate Pain (4 - 6)  HYDROmorphone   Tablet 2 milliGRAM(s) Oral every 6 hours PRN Severe Pain (7 - 10)    Plavix (Hives)  Toprol-XL (Rash)    T(C): 36.8 (05-13-22 @ 09:36), Max: 36.8 (05-13-22 @ 09:36)  HR: 73 (05-13-22 @ 09:36) (61 - 73)  BP: 156/88 (05-13-22 @ 09:36) (128/58 - 160/76)  RR: 18 (05-13-22 @ 09:36) (13 - 22)  SpO2: 98% (05-13-22 @ 09:36) (89% - 100%)    I&O's Summary    12 May 2022 07:01  -  13 May 2022 07:00  --------------------------------------------------------  IN: 600 mL / OUT: 0 mL / NET: 600 mL      PHYSICAL EXAM:  Constitutional: Elderly, frail male, in no acute distress  Neuro: Awake alert, oriented  HEENT: PERRL, anicteric sclerae. Scant nose bleed noted  Neck: supple, no JVD  CV: Irregular +S1S2,   Pulm/chest: lung sounds clear bilaterally, no accessory muscle use noted  Abd: soft, NT, ND, +BS. No rigidity, guarding, rebound tenderness    Ext:  Left LE necrotic  digits, +erythema,   Skin:  no jaundice   Psych: calm, appropriate affect      LABS:               10.4   5.64  )-----------( 156      ( 05-13 @ 09:47 )             33.4                11.2   5.80  )-----------( 164      ( 05-12 @ 04:25 )             36.4                10.7   6.08  )-----------( 193      ( 05-11 @ 19:14 )             34.4                8.5    5.43  )-----------( 178      ( 05-11 @ 03:44 )             27.6                9.4    5.73  )-----------( 205      ( 05-10 @ 22:32 )             29.8                10.1   6.21  )-----------( 252      ( 05-10 @ 15:00 )             32.6       05-13    137  |  97<L>  |  44.9<H>  ----------------------------<  96  4.5   |  23.0  |  3.97<H>    Ca    8.8      13 May 2022 09:47  Phos  3.5     05-13  Mg     2.0     05-13      PT/INR - ( 12 May 2022 04:25 )   PT: 14.4 sec;   INR: 1.24 ratio         PTT - ( 12 May 2022 04:25 )  PTT:40.4 sec

## 2022-05-13 NOTE — PROGRESS NOTE ADULT - ASSESSMENT
87M h/o GI bleed 2/2 AVMs, ESRD on HD (M/F), CAD, HTN, PPM ,  CHFpEF, arrythmia, HTN, HLD, CAD, a-fib and LLE DVT,  AS s/p TAVR, and PAD (RLE fem-pop bypass, revised LLE fem-pop bypass, March 2021, CFA to AT with cryovein ) , recent hospitalization for left leg pvd , redness , and for left bypass graft  thrombosed. s/p revision of the bypass with thrombectomy. patient was dcd on  Lovenox 1mg/kg/day,  now presents with 3-4 episodes of BPBPR started last night , and was chano to er by daughter.   patient had another large BRBPR in er , RRT was called. sbp in 120s-150s.   PT WITH GANGRENOUS TOES LEFT FOOT WHICH WERE DURING LAST HOSPITAL VISIT   PT NON TOXIC AFEBRILE  AS ABOVE GANGRENE TOES LEFT FOOT  AREA IS DRY NO SURROUNDING CELLULITIS NO ODORS   WILL CONTINUE TO DEFER ABX AT THIS TIME AS NO CLEAR EVIDENCE OF INFECTION  IF SPIKES WOULD OBTAIN BLOOD CX  X2 SETS AND CONSIDER ABX THEN  WILL FOLLLOW UP AS  NEEDED PLEASE CALL IF QUESTIONS

## 2022-05-13 NOTE — PROGRESS NOTE ADULT - SUBJECTIVE AND OBJECTIVE BOX
OVERNIGHT EVENTS: no acute issues, restarted on AC, transferred out of step down now on 3 tower     Present Symptoms:     Dyspnea: none   Nausea/Vomiting: No  Anxiety:  No  Depression: No  Fatigue: Yes   Loss of appetite: No  Constipation: none     Pain: none             Character-            Duration-            Effect-            Factors-            Frequency-            Location-            Severity-    Pain AD Score:  [email or web address] (press ctrl + left click to view)    Review of Systems: Reviewed                     Negative: no chest pain                      All others negative    MEDICATIONS  (STANDING):  apixaban 2.5 milliGRAM(s) Oral every 12 hours  atorvastatin 80 milliGRAM(s) Oral at bedtime  cadexomer iodine 0.9% Gel 1 Application(s) Topical two times a day  chlorhexidine 2% Cloths 1 Application(s) Topical <User Schedule>  chlorhexidine 4% Liquid 1 Application(s) Topical <User Schedule>  hydrocortisone hemorrhoidal Suppository 1 Suppository(s) Rectal every 12 hours  isosorbide   mononitrate ER Tablet (IMDUR) 30 milliGRAM(s) Oral daily  melatonin 3 milliGRAM(s) Oral at bedtime  multivitamin Oral Tab/Cap - Peds 1 Tablet(s) Oral daily  pantoprazole  Injectable 40 milliGRAM(s) IV Push every 12 hours  polyethylene glycol 3350 17 Gram(s) Oral every 12 hours  senna 2 Tablet(s) Oral at bedtime  sevelamer carbonate 800 milliGRAM(s) Oral three times a day  tamsulosin 0.4 milliGRAM(s) Oral at bedtime  torsemide 20 milliGRAM(s) Oral daily    MEDICATIONS  (PRN):  acetaminophen     Tablet .. 650 milliGRAM(s) Oral every 6 hours PRN Temp greater or equal to 38C (100.4F), Mild Pain (1 - 3), Moderate Pain (4 - 6)  HYDROmorphone   Tablet 2 milliGRAM(s) Oral every 6 hours PRN Severe Pain (7 - 10)    PHYSICAL EXAM:    Vital Signs Last 24 Hrs  T(C): 36.8 (13 May 2022 09:36), Max: 36.8 (13 May 2022 09:36)  T(F): 98.3 (13 May 2022 09:36), Max: 98.3 (13 May 2022 09:36)  HR: 73 (13 May 2022 09:36) (61 - 73)  BP: 156/88 (13 May 2022 09:36) (128/58 - 160/76)  BP(mean): 90 (12 May 2022 19:00) (61 - 92)  RR: 18 (13 May 2022 09:36) (13 - 20)  SpO2: 98% (13 May 2022 09:36) (95% - 100%)    General: alert in no acute distress, cachexia      Karnofsky:  30 %    HEENT: normal    Lungs: comfortable     CV: normal      GI: normal     : normal     MSK: weakness      Skin: no rash    LABS:                      10.4   5.64  )-----------( 156      ( 13 May 2022 09:47 )             33.4     05-13    137  |  97<L>  |  44.9<H>  ----------------------------<  96  4.5   |  23.0  |  3.97<H>    Ca    8.8      13 May 2022 09:47  Phos  3.5     05-13  Mg     2.0     05-13    PT/INR - ( 12 May 2022 04:25 )   PT: 14.4 sec;   INR: 1.24 ratio       PTT - ( 12 May 2022 04:25 )  PTT:40.4 sec    I&O's Summary    12 May 2022 07:01  -  13 May 2022 07:00  --------------------------------------------------------  IN: 600 mL / OUT: 0 mL / NET: 600 mL    RADIOLOGY & ADDITIONAL STUDIES:    < from: Xray Chest 1 View- PORTABLE-Urgent (Xray Chest 1 View- PORTABLE-Urgent .) (05.10.22 @ 13:45) >  IMPRESSION: Increasing infiltrates. No acute bony finding of the left   foot.    --- End of Report ---    < end of copied text >    ADVANCE DIRECTIVES/TREATMENT PREFERENCES:  Full code

## 2022-05-13 NOTE — PROGRESS NOTE ADULT - ASSESSMENT
87M well known to Vascular Service s/p multiple LE procedures admitted with GIB, with known history of AVMs on Lovenox    - AC restarted, will observe

## 2022-05-13 NOTE — PROGRESS NOTE ADULT - ASSESSMENT
87M with ESRD on HD, anemia, CHF with preserved EF, HTN, CAD, AFIB, PVD s/p recent bypass, LLE DVT, admitted with gastrointestinal bleeding.     #1 Gastrointestinal bleeding  - s/p CTA with bleeding noted in anal canal  - s/p sigmoidoscopy with no active bleeding  - hemoglobin stable  - gi following, transfuse if necessary, anusol for internal hemorrhoids  - monitor for signs of bleeding  - hemodynamically stable  - back on AC - monitor for bleeding     #2 PVD  - s/p multiple LE bypasses  - recent bypass 4/20  - back on Eliquis     #3 DVT, left femoral and popliteal   - back on eliquis    #4 ESRD  - HD per nephrology    #5 Encounter for palliative care  - patient with guarded overall prognosis but more stable at this time  - back on AC and being monitored for signs of bleed  - support to family

## 2022-05-13 NOTE — PHYSICAL THERAPY INITIAL EVALUATION ADULT - ADDITIONAL COMMENTS
25-Dec[birth year]2018 as per pt's daughter: prior to admission pt was able to complete bed mobility Independent, transfer sit to stand close supervision with use of the RW, ambulation with RW close supervision, owns and uses transporter W/C, (+) ramp to enter, and aide for 4 hr in the morning 5 times a weak, Family available to assist as needed upon D/C home, denies hx of falling

## 2022-05-13 NOTE — PROGRESS NOTE ADULT - ASSESSMENT
87 yr old male with ESRD on HD, GI bleed secondary to AVMs, CAD, hypertension, PPM, HFpEF, hypertension, hyperlipidemia, CAD, atrial fibrillation, LLE DVT, aortic stenosis s/p TAVR, PAD s/p recent L fem to AT bypass with cryovein c/b acute graft rethrombosis s/p thrombectomy discharged on Lovenox, presented with complaints of BRBPR. Lovenox was discontinued on admission. He developed tachycardia and hypotension, was transferred to medical ICU. Hb was monitored. Vascular surgery, ID, cardiology, GI and nephrology was consulted. Urgent CTA abdomen was done, revealed bleeding around anal canal. Flexible sigmoidoscopy was done by GI, which revealed proctitis and hemorrhoids, Anusol was ordered. Podiatry and ID consulted for left foot dry gangrene, advised local wound care. HD was continued as schedule and he received a total of 2 units of PRBC in ICU. Hematology was consulted, advised starting renally dosed Eliquis given recent vascular interventions and close monitoring of CBC. His CBC in ICU remained stable and he was transferred to step down on 5/12. 87 yr old male with ESRD on HD, GI bleed secondary to AVMs, CAD, hypertension, PPM, HFpEF, hypertension, hyperlipidemia, CAD, atrial fibrillation, LLE DVT, aortic stenosis s/p TAVR, PAD s/p recent L fem to AT bypass with cryovein c/b acute graft rethrombosis s/p thrombectomy discharged on Lovenox, presented with complaints of BRBPR. Lovenox was discontinued on admission. He developed tachycardia and hypotension, was transferred to medical ICU. Hb was monitored. Vascular surgery, ID, cardiology, GI and nephrology was consulted. Urgent CTA abdomen was done, revealed bleeding around anal canal. Flexible sigmoidoscopy was done by GI, which revealed proctitis and hemorrhoids, Anusol was ordered. Podiatry and ID consulted for left foot dry gangrene, advised local wound care. HD was continued as schedule and he received a total of 2 units of PRBC in ICU. Hematology was consulted, advised starting renally dosed Eliquis given recent vascular interventions and close monitoring of CBC. His CBC in ICU remained stable and he was transferred to step down on 5/12.    1. Acute blood loss anemia:  Sec internal hemorrhoids  s/p flex sigmoidoscopy.  Hb stable post 2 units of PRBC  Anusol ordered.  GI following  Monitor Hb on Eliquis.    2. PAD s/p recent bypass and thrombectomy:  Bleeding on Lovenox  now on Eliquis, renally dosed.  Evaluated by hematology.  did not tolerate Plavix.  vascular surgery follow up for left LE wound.    3. ESRD on HD:  Continue HD as per schedule.    4. CAD:  Continue Eliquis, statin.  Continue Imdur.    5. HFpEF:  No signs for fluid overload.    6. Atrial fibrillation s/p PPM:  Continue Eliquis.    7. DVT ppx:  Continue Eliquis.    8. Epistaxis:  currently resolved.  monitor for recurrent episodes  humidified oxygen prn.    Discussed with patient, RN, daughter.   PT consulted.

## 2022-05-13 NOTE — CHART NOTE - NSCHARTNOTEFT_GEN_A_CORE
Called by RN, patient yelling he wants to kill himself. Patient AOX3 is very upset and states he wants to go home. IF he does not go home tonight he will kill himself or call the police. He has no specific thoughts in mind of how he would do it. 1:1 ordered for safety. Per family not medicated for agitation

## 2022-05-13 NOTE — PHYSICAL THERAPY INITIAL EVALUATION ADULT - NS ASR EQUIP WC DETAIL PEDS
Established PATIENT - new problem      Anaya Segovia is 71year old year old male here today for evaluation of right shoulder pain. She's had pain in his right shoulder for many years. He was incarcerated approximately 50 years ago when he sustained a trauma to his right shoulder while weightlifting. He had subsequent surgery to fuse his right shoulder joint. Over the past few years, his pain has gradually increased in severity. His pain at rest, and pain with any kind of movement. He has difficulty with any type of lifting, as well as with sleeping at night. He denies any tingling or numbness distally.     PAST MEDICAL HISTORY:    Other and unspecified hyperlipidemia                          Essential (primary) hypertension                              RAD (reactive airway disease)                                 Sleep apnea                                                   Liver disease                                                 Arthritis                                                     COPD (chronic obstructive pulmonary disease)                  Pneumonia                                                     Diabetes mellitus                                             Blood clot associated with vein wall inflammat*                 Comment: Rt. leg blood clot 2 months ago    Other chronic pain                                              Comment: Lower back pain    Personal history of traumatic fracture                          Comment: Rt. knee contusion    Spinal stenosis in cervical region              12/6/2011     Obesity, unspecified                            12/6/2011     Drug abuse NEC, unspec                                        Multiple falls                                                Cellulitis of right lower extremity                           PVD (peripheral vascular disease)                               Comment: per Dr. Emmanuel Hylton, podiatrist    Diverticulosis of large intestine without "hemo* 3/31/2016     Multiple pulmonary nodules                      3/31/2016       Comment: CT Chest-  rec'd 6 mo f/u (so on or after                9/29/16). PAST SURGICAL HISTORY:      JOINT REPLACEMENT                                               Comment: Rt. shoulder ""12 little pins one big pin plate\""    FRACTURE SURGERY                                1970          IVC FILTER RETRIEVAL                                          COLONOSCOPY                                     5/9/2012        Comment: By Dr. Nilam Ashraf at [address] [place] [address] [place]:    Current Outpatient Prescriptions   Medication   â¢ SYMBICORT 160-4.5 MCG/ACT inhaler   â¢ diclofenac (VOLTAREN) 75 MG EC tablet   â¢ CRYSTAL-GEST ANTACID 500 MG chewable tablet   â¢ OxyCODONE HCl 10 MG immediate release tablet   â¢ Vitamin D, Ergocalciferol, 02979 UNITS capsule   â¢ acetaminophen (TYLENOL) 500 MG tablet   â¢ atorvastatin (LIPITOR) 10 MG tablet   â¢ albuterol (PROAIR HFA) 108 (90 BASE) MCG/ACT inhaler   â¢ lisinopril (ZESTRIL) 10 MG tablet   â¢ ranitidine (ZANTAC) 300 MG tablet   â¢ metformin (GLUCOPHAGE-XR) 500 MG 24 hr tablet   â¢ aspirin 81 MG tablet   â¢ tiotropium (SPIRIVA HANDIHALER) 18 MCG inhalation capsule   â¢ miconazole 2 % powder     No current facility-administered medications for this visit.         ALLERGIES:    ALLERGIES:   Allergen Reactions   â¢ Penicillins HIVES   â¢ Allopurinol RASH   â¢ Voltaren GI UPSET     Stomach knots and cramps       SOCIAL HISTORY:    Social History     Social History   â¢ Marital status: Single     Spouse name: N/A   â¢ Number of children: N/A   â¢ Years of education: N/A     Social History Main Topics   â¢ Smoking status: Current Every Day Smoker     Packs/day: 1.50     Years: 52.00     Types: Cigarettes   â¢ Smokeless tobacco: Never Used   â¢ Alcohol use No      Comment: 3 years ago, last drink 8/2013   â¢ Drug use: 1.00 per week     Special: Marijuana   â¢ Sexual " activity: Not on file     Other Topics Concern   â¢ Not on file     Social History Narrative       REVIEW OF SYSTEMS:    As noted above and otherwise negative. PHYSICIAL EXAMINATION:    Vital Signs: There were no vitals taken for this visit. General: The patient is alert and oriented x 3. He is in no acute distress. He is well groomed and cooperative with the exam.   Extremities: On examination of the Right shoulder there is no atrophy or deformity. There is tenderness to palpation of the posterior and lateral aspect of the shoulder. Range of motion is from 0- 40 Â° of flexion, 0- 30 Â° of abduction, 0- 20 Â° of external rotation. Lorenzo and Neer impingement tests are inconclusive. Dulce's test is inconclusive. He has significant weakness and pain with resisted external rotation and abduction. Sensation is intact light touch in the radial, median, and ulnar nerve distributions. Radial pulses palpable. RADIOGRAPHS:  3 views of the right shoulder taken today reveal metallic hardware is in place compatible with prior open reduction internal fixation of a proximal humerus fracture. No radiographic evidence of hardware loosening or associated complication. There is apparent bony ankylosis of the proximal humerus and glenoid. No acute fracture, dislocation or subluxation. No osseous erosion or destruction. The visualized soft tissues are unremarkable. 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 At this time we are recommending a referral to a shoulder specialist who may have more expertise in treating a posttraumatic shoulder with severe osteoarthritis and indwelling hardware. We discussed the likelihood that he may require surgical intervention in the future, but would refer him to another physician for evaluation. He may follow-up on an as-needed basis. reclining/elevating leg rests

## 2022-05-13 NOTE — PROVIDER CONTACT NOTE (OTHER) - SITUATION
RN noticed hematoma on leg left thigh during assessment, after looking through notes, no documentation of hematoma in chart per RN.

## 2022-05-13 NOTE — PROGRESS NOTE ADULT - NS ATTEND AMEND GEN_ALL_CORE FT
I evaluated this pt. with my NP and agree with the above assessment and management plan. Pt. w/o rectal bleeding. He has had intermittent epistaxis which has stopped. Presently being dialyzed. Continue present management for now.

## 2022-05-13 NOTE — PROGRESS NOTE ADULT - SUBJECTIVE AND OBJECTIVE BOX
INFECTIOUS DISEASES AND INTERNAL MEDICINE at [facility]  =======================================================  Pawan Short MD  Diplomates American Board of Internal Medicine and Infectious Diseases  Telephone [phone]  Fax            [phone]  =======================================================    CHRISTIANO ELIZABETH [id number]    Follow up: LEFT TOE GANGRENE    Allergies:  Plavix (Hives)  Toprol-XL (Rash)      Medications:  acetaminophen     Tablet .. 650 milliGRAM(s) Oral every 6 hours PRN  apixaban 2.5 milliGRAM(s) Oral every 12 hours  atorvastatin 80 milliGRAM(s) Oral at bedtime  cadexomer iodine 0.9% Gel 1 Application(s) Topical two times a day  chlorhexidine 2% Cloths 1 Application(s) Topical <User Schedule>  chlorhexidine 4% Liquid 1 Application(s) Topical <User Schedule>  hydrocortisone hemorrhoidal Suppository 1 Suppository(s) Rectal every 12 hours  HYDROmorphone   Tablet 2 milliGRAM(s) Oral every 6 hours PRN  isosorbide   mononitrate ER Tablet (IMDUR) 30 milliGRAM(s) Oral daily  melatonin 3 milliGRAM(s) Oral at bedtime  multivitamin Oral Tab/Cap - Peds 1 Tablet(s) Oral daily  pantoprazole  Injectable 40 milliGRAM(s) IV Push every 12 hours  polyethylene glycol 3350 17 Gram(s) Oral every 12 hours  senna 2 Tablet(s) Oral at bedtime  sevelamer carbonate 800 milliGRAM(s) Oral three times a day  tamsulosin 0.4 milliGRAM(s) Oral at bedtime  torsemide 20 milliGRAM(s) Oral daily  traZODone 50 milliGRAM(s) Oral at bedtime    SOCIAL       FAMILY   FAMILY HISTORY:  Family history of cancer (Grandparent)    Family history of lung cancer (Grandparent)    Family history of premature CAD    FH: type 2 diabetes      REVIEW OF SYSTEMS:  CONSTITUTIONAL:  No Fever or chills  HEENT:   No diplopia or blurred vision.  No earache, sore throat or runny nose.  CARDIOVASCULAR:  No pressure, squeezing, strangling, tightness, heaviness or aching about the chest, neck, axilla or epigastrium.  RESPIRATORY:  No cough, shortness of breath, PND or orthopnea.  GASTROINTESTINAL:  No nausea, vomiting or diarrhea.  GENITOURINARY:  No dysuria, frequency or urgency. No Blood in urine  MUSCULOSKELETAL:   moves all joints  SKIN:  No change in skin, hair or nails.  NEUROLOGIC:  No paresthesias, fasciculations, seizures or weakness.  PSYCHIATRIC:  No disorder of thought or mood.  ENDOCRINE:  No heat or cold intolerance, polyuria or polydipsia.  HEMATOLOGICAL:  No easy bruising or bleeding.            Physical Exam:  ICU Vital Signs Last 24 Hrs  T(C): 36.7 (13 May 2022 13:13), Max: 36.8 (13 May 2022 09:36)  T(F): 98 (13 May 2022 13:13), Max: 98.3 (13 May 2022 09:36)  HR: 57 (13 May 2022 13:13) (57 - 73)  BP: 169/81 (13 May 2022 13:13) (128/58 - 169/81)  BP(mean): 90 (12 May 2022 19:00) (82 - 92)  ABP: --  ABP(mean): --  RR: 18 (13 May 2022 13:13) (16 - 20)  SpO2: 100% (13 May 2022 13:13) (95% - 100%)    GEN: NAD,   HEENT: normocephalic and atraumatic. EOMI. DAISY.    NECK: Supple. No carotid bruits.  No lymphadenopathy or thyromegaly.  LUNGS: Clear to auscultation.  HEART: Regular rate and rhythm without murmur.  ABDOMEN: Soft, nontender, and nondistended.  Positive bowel sounds.    : No CVA tenderness  EXTREMITIES: Without any cyanosis, clubbing, rash, lesions or edema.  MSK: no joint swelling  NEUROLOGIC: Cranial nerves II through XII are grossly intact.  PSYCHIATRIC: Appropriate affect .  SKIN: No ulceration or induration present.        Labs:  Vitals:  ============  T(F): 98 (13 May 2022 13:13), Max: 98.3 (13 May 2022 09:36)  HR: 57 (13 May 2022 13:13)  BP: 169/81 (13 May 2022 13:13)  RR: 18 (13 May 2022 13:13)  SpO2: 100% (13 May 2022 13:13) (95% - 100%)  temp max in last 48H T(F): , Max: 98.3 (05-11-22 @ 23:00)    =======================================================  Current Antibiotics:    Other medications:  apixaban 2.5 milliGRAM(s) Oral every 12 hours  atorvastatin 80 milliGRAM(s) Oral at bedtime  cadexomer iodine 0.9% Gel 1 Application(s) Topical two times a day  chlorhexidine 2% Cloths 1 Application(s) Topical <User Schedule>  chlorhexidine 4% Liquid 1 Application(s) Topical <User Schedule>  hydrocortisone hemorrhoidal Suppository 1 Suppository(s) Rectal every 12 hours  isosorbide   mononitrate ER Tablet (IMDUR) 30 milliGRAM(s) Oral daily  melatonin 3 milliGRAM(s) Oral at bedtime  multivitamin Oral Tab/Cap - Peds 1 Tablet(s) Oral daily  pantoprazole  Injectable 40 milliGRAM(s) IV Push every 12 hours  polyethylene glycol 3350 17 Gram(s) Oral every 12 hours  senna 2 Tablet(s) Oral at bedtime  sevelamer carbonate 800 milliGRAM(s) Oral three times a day  tamsulosin 0.4 milliGRAM(s) Oral at bedtime  torsemide 20 milliGRAM(s) Oral daily  traZODone 50 milliGRAM(s) Oral at bedtime      =======================================================  Labs:                        10.4   5.64  )-----------( 156      ( 13 May 2022 09:47 )             33.4     05-13    137  |  97<L>  |  44.9<H>  ----------------------------<  96  4.5   |  23.0  |  3.97<H>    Ca    8.8      13 May 2022 09:47  Phos  3.5     05-13  Mg     2.0     05-13        Creatinine, Serum: 3.97 mg/dL (05-13-22 @ 09:47)  Creatinine, Serum: 3.22 mg/dL (05-12-22 @ 04:25)  Creatinine, Serum: 3.95 mg/dL (05-11-22 @ 03:44)  Creatinine, Serum: 3.23 mg/dL (05-10-22 @ 05:33)  Creatinine, Serum: 5.09 mg/dL (05-09-22 @ 13:33)  Creatinine, Serum: 5.27 mg/dL (05-09-22 @ 04:51)    Procalcitonin, Serum: 0.39 ng/mL (05-10-22 @ 15:00)          WBC Count: 5.64 K/uL (05-13-22 @ 09:47)  WBC Count: 5.80 K/uL (05-12-22 @ 04:25)  WBC Count: 6.08 K/uL (05-11-22 @ 19:14)  WBC Count: 5.43 K/uL (05-11-22 @ 03:44)  WBC Count: 5.73 K/uL (05-10-22 @ 22:32)  WBC Count: 6.21 K/uL (05-10-22 @ 15:00)  WBC Count: 4.51 K/uL (05-10-22 @ 05:33)  WBC Count: 4.68 K/uL (05-09-22 @ 19:50)  WBC Count: 5.85 K/uL (05-09-22 @ 13:33)  WBC Count: 5.13 K/uL (05-09-22 @ 09:20)  WBC Count: 6.73 K/uL (05-09-22 @ 04:51)    SARS-CoV-2 Result: NotDetec (05-09-22 @ 04:51)  COVID-19 PCR: NotDetec (04-25-22 @ 06:00)  COVID-19 PCR: NotDetec (04-20-22 @ 07:44)  COVID-19 PCR: NotDetec (04-19-22 @ 19:04)      Alkaline Phosphatase, Serum: 203 U/L (05-11-22 @ 03:44)  Alkaline Phosphatase, Serum: 203 U/L (05-10-22 @ 05:33)  Alanine Aminotransferase (ALT/SGPT): 22 U/L (05-11-22 @ 03:44)  Alanine Aminotransferase (ALT/SGPT): 17 U/L (05-10-22 @ 05:33)  Aspartate Aminotransferase (AST/SGOT): 36 U/L (05-11-22 @ 03:44)  Aspartate Aminotransferase (AST/SGOT): 30 U/L (05-10-22 @ 05:33)  Bilirubin Total, Serum: 0.4 mg/dL (05-11-22 @ 03:44)  Bilirubin Total, Serum: 0.9 mg/dL (05-10-22 @ 05:33)

## 2022-05-13 NOTE — CHART NOTE - NSCHARTNOTEFT_GEN_A_CORE
Patient with 4 beats of VTACH, patient now back to baseline rhythm  Patient agitated when episode occurred  1:1 at bedside, VSS   STAT Mag, Phos, and BMP pending   RN to notify with any acute changes Patient with 4 beats of VTACH, patient now back to baseline rhythm  Patient agitated when episode occurred  Patient seen at bedside, now calm and denies any chest pain or symptoms   1:1 at bedside, VSS   STAT Mag, Phos, and BMP pending   RN to notify with any acute changes Patient with 4 beats of VTACH, patient now back to baseline rhythm  Patient agitated when episode occurred  Patient seen at bedside, now calm and denies any chest pain or symptoms   1:1 at bedside, VSS   STAT Mag, Phos, and BMP pending     Addendum 22:00   Electrolytes WNL  Patient with /66 HR 60  Daughter at bedside, patient is calm   Asymptomatic    Hydralazine 5mg IVP x1   Will reinstate patient's Procardia for the AM (home medication) as patient's BP has been running high   RN to notify with any acute changes

## 2022-05-13 NOTE — PHYSICAL THERAPY INITIAL EVALUATION ADULT - ACTIVE RANGE OF MOTION EXAMINATION, REHAB EVAL
assessed during functional mobility, resistance not applied left ankle joint limited active/passive ROM limited 1/5/bilateral upper extremity Active ROM was WFL (within functional limits)/Right LE Active ROM was WFL (within functional limits)

## 2022-05-13 NOTE — PROGRESS NOTE ADULT - SUBJECTIVE AND OBJECTIVE BOX
INTERVAL HPI/OVERNIGHT EVENTS: no new complaints and no pvernight events, downgraded from SICU yesterday        MEDICATIONS  (STANDING):  apixaban 2.5 milliGRAM(s) Oral every 12 hours  atorvastatin 80 milliGRAM(s) Oral at bedtime  cadexomer iodine 0.9% Gel 1 Application(s) Topical two times a day  chlorhexidine 2% Cloths 1 Application(s) Topical <User Schedule>  chlorhexidine 4% Liquid 1 Application(s) Topical <User Schedule>  hydrocortisone hemorrhoidal Suppository 1 Suppository(s) Rectal every 12 hours  isosorbide   mononitrate ER Tablet (IMDUR) 30 milliGRAM(s) Oral daily  melatonin 3 milliGRAM(s) Oral at bedtime  multivitamin Oral Tab/Cap - Peds 1 Tablet(s) Oral daily  pantoprazole  Injectable 40 milliGRAM(s) IV Push every 12 hours  polyethylene glycol 3350 17 Gram(s) Oral every 12 hours  senna 2 Tablet(s) Oral at bedtime  sevelamer carbonate 800 milliGRAM(s) Oral three times a day  tamsulosin 0.4 milliGRAM(s) Oral at bedtime  torsemide 20 milliGRAM(s) Oral daily    MEDICATIONS  (PRN):  acetaminophen     Tablet .. 650 milliGRAM(s) Oral every 6 hours PRN Temp greater or equal to 38C (100.4F), Mild Pain (1 - 3), Moderate Pain (4 - 6)  HYDROmorphone   Tablet 2 milliGRAM(s) Oral every 6 hours PRN Severe Pain (7 - 10)      Vital Signs Last 24 Hrs  T(C): 36.7 (13 May 2022 04:35), Max: 36.8 (12 May 2022 07:23)  T(F): 98.1 (13 May 2022 04:35), Max: 98.2 (12 May 2022 07:23)  HR: 71 (13 May 2022 04:35) (61 - 78)  BP: 160/76 (13 May 2022 04:35) (121/67 - 164/86)  BP(mean): 90 (12 May 2022 19:00) (61 - 105)  RR: 18 (13 May 2022 04:35) (13 - 22)  SpO2: 96% (13 May 2022 04:35) (89% - 100%)    PHYSICAL EXAM:      Constitutional: NAD    Respiratory: no accessory muscle use or conversational dyspnea    Cardiovascular: RRR    Gastrointestinal: soft, NT/ND    Extremities:  Palpable DP pulse 2+ bilaterally. Well healed groin incision. Left leg wounds without signs of infection, dry gangrene of toes      I&O's Detail    11 May 2022 07:01  -  12 May 2022 07:00  --------------------------------------------------------  IN:    IV PiggyBack: 100 mL    Oral Fluid: 660 mL  Total IN: 760 mL    OUT:    Other (mL): 500 mL    Voided (mL): 2 mL  Total OUT: 502 mL    Total NET: 258 mL      12 May 2022 07:01  -  13 May 2022 06:34  --------------------------------------------------------  IN:    Oral Fluid: 600 mL  Total IN: 600 mL    OUT:  Total OUT: 0 mL    Total NET: 600 mL          LABS:                        11.2   5.80  )-----------( 164      ( 12 May 2022 04:25 )             36.4     05-12    139  |  99  |  34.7<H>  ----------------------------<  92  4.0   |  25.0  |  3.22<H>    Ca    8.8      12 May 2022 04:25  Phos  2.9     05-12  Mg     2.0     05-12      PT/INR - ( 12 May 2022 04:25 )   PT: 14.4 sec;   INR: 1.24 ratio         PTT - ( 12 May 2022 04:25 )  PTT:40.4 sec      RADIOLOGY & ADDITIONAL STUDIES:

## 2022-05-13 NOTE — PROGRESS NOTE ADULT - ASSESSMENT
87M with PMH of ESRD on HD (M/F), anemia, CHFpEF, arrythmia, HTN, HLD, CAD, a-fib and LLE DVT (used to be on Flecainide and no longer on AC due to GI bleed), AS s/p TAVR, and PAD (RLE fem-pop bypass, revised LLE fem-pop bypass, March 2021, CFA to AT with cryovein ),   left lower extremity SFA and popliteal artery  occlusion with distal peroneal and AT run off, s/p left femoral to AT bypass with cryovein on 4/13. s/p thrombectomy (04/14), s/p angioplasty of the graft with thrombectomy on 4/20. Patient was discharged home on 04/27 with therapeutic dose of  Lovenox. GI consult for GI bleed. CTA abdomen pelvis on admission showed active bleeding at the level of anal canal. Status post Flex. sigmoidoscopy (05/06) reveals no visible source of active bleeding, shows proctitis. His last colonoscopy (June 2020) showed some residual of the prior large polyp which was removed from the ascending colon. There were left-sided diverticuli as well and moderate sized internal hemorrhoids.

## 2022-05-14 NOTE — PROGRESS NOTE ADULT - NS ATTEND OPT1 GEN_ALL_CORE

## 2022-05-14 NOTE — PROGRESS NOTE ADULT - PROBLEM SELECTOR PLAN 1
CTA abd/pelv with active bleeding at the level of anal canal. s/p sigmoidoscopy, no evidence of active bleeding, shows irrigated area at the level of dentate line.  Avoid constipation, continue Miralax   Anusol cream for internal hemorrhoids.   Continue Protonix   Trend CBC, transfuse to Hgb 8 or higher.   Nephrology following, on HD protocol Monday/Friday.
Most likely from hemorrhoidal bleed or from a diverticular bleed in setting of Eliquis. Flex sigmoidoscopy reveals proctitis. Rectal bleed is now resolved. His hemoglobin today 10.4 gm, down from 11.2 yesterday, most likely dilutional. Patient is for HD today.   Anusol cream for internal hemorrhoids.   Avoid constipation, continue Miralax, senna  Continue Protonix, h/o  erosive antral gastritis.  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 Noted to have some Epistaxis, please continue to  monitor, will recommend to switch to humidified O2  Please continue to monitor Hgb.
Most likely from hemorrhoidal bleed or from a diverticular bleed in setting of Eliquis. Flex sigmoidoscopy reveals proctitis. Rectal bleed is now resolved. Awaiting AM labs.     Plan:  Anusol cream for internal hemorrhoids.   Avoid constipation, continue Miralax, senna  Continue Protonix, h/o  erosive antral gastritis.  Continue to trend Hgb, transfuse as needed.
CTA abd/pelv with active bleeding at the level of anal canal. s/p sigmoidoscopy, no evidence of active bleeding, shows proctitis at the level of dentate line. Today his hemoglobin 8.5 gm, down from 9.4 yesterday, most likely from hemodilution. Patient is for HD today. 1 unit of packed RBCs is ordered by ICU team   Avoid constipation, continue Miralax   Anusol cream for internal hemorrhoids.   Continue Protonix   Okay to resume Heparin from GI standpoint given patient with recent LE thrombus. Please continue to monitor Hgb, and signs of active bleeding.

## 2022-05-14 NOTE — PROGRESS NOTE ADULT - ASSESSMENT
87 yr old male with ESRD on HD, GI bleed secondary to AVMs, CAD, hypertension, PPM, HFpEF, hypertension, hyperlipidemia, CAD, atrial fibrillation, LLE DVT, aortic stenosis s/p TAVR, PAD s/p recent L fem to AT bypass with cryovein c/b acute graft rethrombosis s/p thrombectomy discharged on Lovenox, presented with complaints of BRBPR. Lovenox was discontinued on admission. He developed tachycardia and hypotension, was transferred to medical ICU. Hb was monitored. Vascular surgery, ID, cardiology, GI and nephrology was consulted. Urgent CTA abdomen was done, revealed bleeding around anal canal. Flexible sigmoidoscopy was done by GI, which revealed proctitis and hemorrhoids, Anusol was ordered. Podiatry and ID consulted for left foot dry gangrene, advised local wound care. HD was continued as schedule and he received a total of 2 units of PRBC in ICU. Hematology was consulted, advised starting renally dosed Eliquis given recent vascular interventions and close monitoring of CBC. His CBC in ICU remained stable and he was transferred to step down on 5/12.    Acute blood loss anemia:  Sec internal hemorrhoids  s/p flex sigmoidoscopy.  Hb stable post 2 units of PRBC  Anusol ordered.  Monitor Hb on Eliquis.  GI recommendations appreciated    PAD s/p recent bypass and thrombectomy:  Bleeding on Lovenox  Now on Eliquis, renally dosed  Evaluated by hematology.  did not tolerate Plavix.  Vascular surgery recs appreciated, will observe on AC    ESRD on HD:  Continue HD as per schedule.    CAD:  Continue Eliquis, statin.  Continue Imdur.    HFpEF:  No signs for fluid overload.    Atrial fibrillation s/p PPM:  Continue Eliquis.    Epistaxis:  resolved   monitor for recurrent episodes  humidified oxygen prn.    DVT ppx  Eliquis

## 2022-05-14 NOTE — PROGRESS NOTE ADULT - SUBJECTIVE AND OBJECTIVE BOX
Chief complaint: GIB    Patient seen and examined at bedside. Patient states he is doing well and has no complaints. Denies headache, fever, chills, cough, chest pain, shortness of breath, nausea or vomiting.     Vital Signs Last 24 Hrs  T(F): 98.4 (14 May 2022 04:34), Max: 98.4 (14 May 2022 04:34)  HR: 75 (14 May 2022 04:34) (57 - 76)  BP: 145/71 (14 May 2022 04:34) (145/71 - 170/66)  RR: 20 (14 May 2022 04:34) (16 - 20)  SpO2: 92% (14 May 2022 04:34) (92% - 100%)    Physical Exam:  Constitutional: alert and oriented, in no acute distress   Neck: Soft and supple  Respiratory: Clear to auscultation bilaterally, no wheezes or crackles  Cardiovascular: Regular rate and rhythm  Gastrointestinal: Soft, non-tender to palpation, +bs  : carrera in place  Vascular: 2+ peripheral pulses  Musculoskeletal: no lower extremity edema bilaterally    Labs:                        11.8   5.88  )-----------( 174      ( 14 May 2022 08:48 )             38.4   05-14    139  |  98  |  28.5<H>  ----------------------------<  108<H>  4.3   |  24.0  |  3.07<H>    Ca    9.3      14 May 2022 08:48  Phos  2.6     05-14  Mg     2.0     05-14

## 2022-05-14 NOTE — PROGRESS NOTE ADULT - ASSESSMENT
1) ESRD on HD  2) Anemia of chronic disease with superimposed LGIB  3) PVD s/p recent left anterior tibial bypass in April 2022- on therapeutic Lovenox outpt for previous bypass occlusion    Continue HD MF schedule  Hb stable  On Eliquis now- monitor H/H  Continue AMY with HD

## 2022-05-14 NOTE — CHART NOTE - NSCHARTNOTEFT_GEN_A_CORE
Source: Patient [X ]  Family [ ]   other [ ]    86 yo male with ESRD on HD, HFpEF, CAD, Afib, HTN, LLE DVT, prior GI bleeding, AS s/p TAVR, PVD s/p recent left anterior tibial bypass in April 2022, discharged on lovenox, now admitted with GI bleeding, acute blood loss anemia.  Found to have rectal bleeding on CTA but no active bleeding during sigmoidoscopy.     Current Diet:   Diet, Renal Restrictions:   For patients receiving Renal Replacement - No Protein Restr, No Conc K, No Conc Phos, Low Sodium  Supplement Feeding Modality:  Oral  Nepro Cans or Servings Per Day:  1       Frequency:  Daily (05-10-22 @ 12:30)    Patient reports [ ] nausea  [ ] vomiting [ ] diarrhea [ ] constipation  [ ]chewing problems [ ] swallowing issues  [ ] other:     PO intake:  < 50% [ ]   50-75%  [ ]   %  [X ]  other :    Source for PO intake [X ] Patient [ ] family [ ] chart [ ] staff [ ] other    Current Weight:   (4/13) 144 lbs  (4/9) 83.16  (4/9) 145.2    % Weight Change: accuracy of wts? noted with edema 2+ RT foot/ankle; 3+LT foot/ankle    Pertinent Medications: MEDICATIONS  (STANDING):  apixaban 2.5 milliGRAM(s) Oral every 12 hours  atorvastatin 80 milliGRAM(s) Oral at bedtime  cadexomer iodine 0.9% Gel 1 Application(s) Topical two times a day  chlorhexidine 2% Cloths 1 Application(s) Topical <User Schedule>  chlorhexidine 4% Liquid 1 Application(s) Topical <User Schedule>  hydrocortisone hemorrhoidal Suppository 1 Suppository(s) Rectal every 12 hours  isosorbide   mononitrate ER Tablet (IMDUR) 30 milliGRAM(s) Oral daily  melatonin 3 milliGRAM(s) Oral at bedtime  multivitamin Oral Tab/Cap - Peds 1 Tablet(s) Oral daily  NIFEdipine XL 90 milliGRAM(s) Oral daily  pantoprazole  Injectable 40 milliGRAM(s) IV Push every 12 hours  polyethylene glycol 3350 17 Gram(s) Oral every 12 hours  senna 2 Tablet(s) Oral at bedtime  sevelamer carbonate 800 milliGRAM(s) Oral three times a day  tamsulosin 0.4 milliGRAM(s) Oral at bedtime  torsemide 20 milliGRAM(s) Oral daily  traZODone 50 milliGRAM(s) Oral at bedtime    MEDICATIONS  (PRN):  acetaminophen     Tablet .. 650 milliGRAM(s) Oral every 6 hours PRN Temp greater or equal to 38C (100.4F), Mild Pain (1 - 3), Moderate Pain (4 - 6)  HYDROmorphone   Tablet 2 milliGRAM(s) Oral every 6 hours PRN Severe Pain (7 - 10)    Pertinent Labs: CBC Full  -  ( 14 May 2022 08:48 )  WBC Count : 5.88 K/uL  RBC Count : 3.77 M/uL  Hemoglobin : 11.8 g/dL  Hematocrit : 38.4 %  Platelet Count - Automated : 174 K/uL  Mean Cell Volume : 101.9 fl  Mean Cell Hemoglobin : 31.3 pg  Mean Cell Hemoglobin Concentration : 30.7 gm/dL  Auto Neutrophil # : x  Auto Lymphocyte # : x  Auto Monocyte # : x  Auto Eosinophil # : x  Auto Basophil # : x  Auto Neutrophil % : x  Auto Lymphocyte % : x  Auto Monocyte % : x  Auto Eosinophil % : x  Auto Basophil % : x    05-14 Na139 mmol/L Glu 108 mg/dL<H> K+ 4.3 mmol/L Cr  3.07 mg/dL<H> BUN 28.5 mg/dL<H> Phos 2.6 mg/dL Alb n/a   PAB n/a       Skin: no skin breakdown noted    Nutrition focused physical exam conducted - found signs of malnutrition [ ]absent [X ]present    Subcutaneous fat loss: [X ] Orbital fat pads region, [X ]Buccal fat region, [ ]Triceps region,  [ ]Ribs region    Muscle wasting: [X ]Temples region, [X ]Clavicle region, [X ]Shoulder region, [ ]Scapula region, [ ]Interosseous region,  [ ]thigh region, [ ]Calf region    Estimated Needs:   [ X] no change since previous assessment  [ ] recalculated:     Current Nutrition Diagnosis: Pt remains at high nutrition risk secondary to Malnutrition (severe chronic)  related to inability to consume sufficient protein energy intake in setting of extensive medical hx and multiple hospitalizations  as evidenced by pt with severe muscle wasting/fat loss; likely meeting <75% est energy intake > 3 mo. Pt PO intake appears to be improving, demonstrating >75% at breakfast at this time. Last BM this morning (5/14) per documentation. Continue to monitor and encourage PO intake, strict Is&Os, trend wts 2x/week.     Recommendations:   1. Continue diet as ordered with Nepro QD  2. Monitor and encourage PO intake  3. Strict Is&Os, trend wts 2x/week  4. Monitor chem 8, Mg, Phos, H/H    Monitoring and Evaluation:   [ X] PO intake [ ] Tolerance to diet prescription [X] Weights  [X] Follow up per protocol [X] Labs: Source: Patient [X ]  Family [ ]   other [ ]    86 yo male with ESRD on HD, HFpEF, CAD, Afib, HTN, LLE DVT, prior GI bleeding, AS s/p TAVR, PVD s/p recent left anterior tibial bypass in April 2022, discharged on lovenox, now admitted with GI bleeding, acute blood loss anemia.  Found to have rectal bleeding on CTA but no active bleeding during sigmoidoscopy.     Current Diet:   Diet, Renal Restrictions:   For patients receiving Renal Replacement - No Protein Restr, No Conc K, No Conc Phos, Low Sodium  Supplement Feeding Modality:  Oral  Nepro Cans or Servings Per Day:  1       Frequency:  Daily (05-10-22 @ 12:30)    Patient reports [ ] nausea  [ ] vomiting [ ] diarrhea [ ] constipation  [ ]chewing problems [ ] swallowing issues  [ ] other:     PO intake:  < 50% [ ]   50-75%  [ ]   %  [X ]  other :    Source for PO intake [X ] Patient [ ] family [ ] chart [ ] staff [ ] other    Current Weight:   (4/13) 144 lbs  (4/9) 83.16  (4/9) 145.2    % Weight Change: accuracy of wts? noted with edema 2+ RT foot/ankle; 3+LT foot/ankle    Pertinent Medications: MEDICATIONS  (STANDING):  apixaban 2.5 milliGRAM(s) Oral every 12 hours  atorvastatin 80 milliGRAM(s) Oral at bedtime  cadexomer iodine 0.9% Gel 1 Application(s) Topical two times a day  chlorhexidine 2% Cloths 1 Application(s) Topical <User Schedule>  chlorhexidine 4% Liquid 1 Application(s) Topical <User Schedule>  hydrocortisone hemorrhoidal Suppository 1 Suppository(s) Rectal every 12 hours  isosorbide   mononitrate ER Tablet (IMDUR) 30 milliGRAM(s) Oral daily  melatonin 3 milliGRAM(s) Oral at bedtime  multivitamin Oral Tab/Cap - Peds 1 Tablet(s) Oral daily  NIFEdipine XL 90 milliGRAM(s) Oral daily  pantoprazole  Injectable 40 milliGRAM(s) IV Push every 12 hours  polyethylene glycol 3350 17 Gram(s) Oral every 12 hours  senna 2 Tablet(s) Oral at bedtime  sevelamer carbonate 800 milliGRAM(s) Oral three times a day  tamsulosin 0.4 milliGRAM(s) Oral at bedtime  torsemide 20 milliGRAM(s) Oral daily  traZODone 50 milliGRAM(s) Oral at bedtime    MEDICATIONS  (PRN):  acetaminophen     Tablet .. 650 milliGRAM(s) Oral every 6 hours PRN Temp greater or equal to 38C (100.4F), Mild Pain (1 - 3), Moderate Pain (4 - 6)  HYDROmorphone   Tablet 2 milliGRAM(s) Oral every 6 hours PRN Severe Pain (7 - 10)    Pertinent Labs: CBC Full  -  ( 14 May 2022 08:48 )  WBC Count : 5.88 K/uL  RBC Count : 3.77 M/uL  Hemoglobin : 11.8 g/dL  Hematocrit : 38.4 %  Platelet Count - Automated : 174 K/uL  Mean Cell Volume : 101.9 fl  Mean Cell Hemoglobin : 31.3 pg  Mean Cell Hemoglobin Concentration : 30.7 gm/dL  Auto Neutrophil # : x  Auto Lymphocyte # : x  Auto Monocyte # : x  Auto Eosinophil # : x  Auto Basophil # : x  Auto Neutrophil % : x  Auto Lymphocyte % : x  Auto Monocyte % : x  Auto Eosinophil % : x  Auto Basophil % : x    05-14 Na139 mmol/L Glu 108 mg/dL<H> K+ 4.3 mmol/L Cr  3.07 mg/dL<H> BUN 28.5 mg/dL<H> Phos 2.6 mg/dL Alb n/a   PAB n/a       Skin: no skin breakdown noted    Nutrition focused physical exam previously conducted - found signs of malnutrition [ ]absent [X ]present    Subcutaneous fat loss: [X ] Orbital fat pads region, [X ]Buccal fat region, [ ]Triceps region,  [ ]Ribs region    Muscle wasting: [X ]Temples region, [X ]Clavicle region, [X ]Shoulder region, [ ]Scapula region, [ ]Interosseous region,  [ ]thigh region, [ ]Calf region    Estimated Needs:   [ X] no change since previous assessment  [ ] recalculated:     Current Nutrition Diagnosis: Pt remains at high nutrition risk secondary to Malnutrition (severe chronic) related to inability to consume sufficient protein energy intake in setting of extensive medical hx and multiple hospitalizations  as evidenced by pt with severe muscle wasting/fat loss; likely meeting <75% est energy intake > 3 mo. Pt PO intake appears to be improving, demonstrating >75% at breakfast at this time. Last BM this morning (5/14) per documentation.     Recommendations:   1. Continue diet as ordered with Nepro QD  2. Monitor and encourage PO intake  3. Strict Is&Os, trend wts 2x/week  4. Monitor chem 8, Mg, Phos, H/H    Monitoring and Evaluation:   [ X] PO intake [ ] Tolerance to diet prescription [X] Weights  [X] Follow up per protocol [X] Labs

## 2022-05-14 NOTE — PROGRESS NOTE ADULT - ASSESSMENT
87M with PMH of ESRD on HD (M/F), anemia, CHFpEF, arrythmia, HTN, HLD, CAD, a-fib and LLE DVT (used to be on Flecainide and no longer on AC due to GI bleed), AS s/p TAVR, and PAD (RLE fem-pop bypass, revised LLE fem-pop bypass, March 2021, CFA to AT with cryovein ), left lower extremity SFA and popliteal artery  occlusion with distal peroneal and AT run off, s/p left femoral to AT bypass with cryovein on 4/13. s/p thrombectomy (04/14), s/p angioplasty of the graft with thrombectomy on 4/20. 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 Doing well. Hb stable and no further bleeding. ADAT. Avoid constipation with miralax, senna. PPI for hx of gastritis. will sign off, please call with questions.

## 2022-05-14 NOTE — PROGRESS NOTE ADULT - ASSESSMENT
87M with PMH of ESRD on HD (M/F), anemia, CHFpEF, arrythmia, HTN, HLD, CAD, a-fib and LLE DVT (used to be on Flecainide and no longer on AC due to GI bleed), AS s/p TAVR, and PAD (RLE fem-pop bypass, revised LLE fem-pop bypass, March 2021, CFA to AT with cryovein ), left lower extremity SFA and popliteal artery  occlusion with distal peroneal and AT run off, s/p left femoral to AT bypass with cryovein on 4/13. s/p thrombectomy (04/14), s/p angioplasty of the graft with thrombectomy on 4/20. Patient was discharged home on 04/27 with therapeutic dose of  Lovenox. GI consult for GI bleed. CTA abdomen pelvis on admission showed active bleeding at the level of anal canal. S/p Flex sigmoidoscopy (05/06) reveals no visible source of active bleeding, shows proctitis. His last colonoscopy (June 2020) showed some residual of the prior large polyp which was removed from the ascending colon. There were left-sided diverticuli as well and moderate sized internal hemorrhoids.

## 2022-05-14 NOTE — PROGRESS NOTE ADULT - SUBJECTIVE AND OBJECTIVE BOX
INTERVAL HPI/OVERNIGHT EVENTS: no new complaints and no pvernight events, stable on the floor, on 1:1        MEDICATIONS  (STANDING):  apixaban 2.5 milliGRAM(s) Oral every 12 hours  atorvastatin 80 milliGRAM(s) Oral at bedtime  cadexomer iodine 0.9% Gel 1 Application(s) Topical two times a day  chlorhexidine 2% Cloths 1 Application(s) Topical <User Schedule>  chlorhexidine 4% Liquid 1 Application(s) Topical <User Schedule>  hydrocortisone hemorrhoidal Suppository 1 Suppository(s) Rectal every 12 hours  isosorbide   mononitrate ER Tablet (IMDUR) 30 milliGRAM(s) Oral daily  melatonin 3 milliGRAM(s) Oral at bedtime  multivitamin Oral Tab/Cap - Peds 1 Tablet(s) Oral daily  pantoprazole  Injectable 40 milliGRAM(s) IV Push every 12 hours  polyethylene glycol 3350 17 Gram(s) Oral every 12 hours  senna 2 Tablet(s) Oral at bedtime  sevelamer carbonate 800 milliGRAM(s) Oral three times a day  tamsulosin 0.4 milliGRAM(s) Oral at bedtime  torsemide 20 milliGRAM(s) Oral daily    MEDICATIONS  (PRN):  acetaminophen     Tablet .. 650 milliGRAM(s) Oral every 6 hours PRN Temp greater or equal to 38C (100.4F), Mild Pain (1 - 3), Moderate Pain (4 - 6)  HYDROmorphone   Tablet 2 milliGRAM(s) Oral every 6 hours PRN Severe Pain (7 - 10)      Vital Signs Last 24 Hrs  T(C): 36.7 (13 May 2022 04:35), Max: 36.8 (12 May 2022 07:23)  T(F): 98.1 (13 May 2022 04:35), Max: 98.2 (12 May 2022 07:23)  HR: 71 (13 May 2022 04:35) (61 - 78)  BP: 160/76 (13 May 2022 04:35) (121/67 - 164/86)  BP(mean): 90 (12 May 2022 19:00) (61 - 105)  RR: 18 (13 May 2022 04:35) (13 - 22)  SpO2: 96% (13 May 2022 04:35) (89% - 100%)    PHYSICAL EXAM:      Constitutional: NAD  Respiratory: no accessory muscle use or conversational dyspnea  Cardiovascular: RRR  Gastrointestinal: soft, NT/ND  Extremities:  Palpable DP pulse 2+ bilaterally. Well healed groin incision. Left leg wounds without signs of infection, dry gangrene of toes      I&O's Detail    11 May 2022 07:01  -  12 May 2022 07:00  --------------------------------------------------------  IN:    IV PiggyBack: 100 mL    Oral Fluid: 660 mL  Total IN: 760 mL    OUT:    Other (mL): 500 mL    Voided (mL): 2 mL  Total OUT: 502 mL    Total NET: 258 mL      12 May 2022 07:01  -  13 May 2022 06:34  --------------------------------------------------------  IN:    Oral Fluid: 600 mL  Total IN: 600 mL    OUT:  Total OUT: 0 mL    Total NET: 600 mL          LABS:                        11.2   5.80  )-----------( 164      ( 12 May 2022 04:25 )             36.4     05-12    139  |  99  |  34.7<H>  ----------------------------<  92  4.0   |  25.0  |  3.22<H>    Ca    8.8      12 May 2022 04:25  Phos  2.9     05-12  Mg     2.0     05-12      PT/INR - ( 12 May 2022 04:25 )   PT: 14.4 sec;   INR: 1.24 ratio         PTT - ( 12 May 2022 04:25 )  PTT:40.4 sec      RADIOLOGY & ADDITIONAL STUDIES:

## 2022-05-14 NOTE — PROGRESS NOTE ADULT - SUBJECTIVE AND OBJECTIVE BOX
[facility] DIVISION OF KIDNEY DISEASES AND HYPERTENSION -- HEMODIALYSIS NOTE  --------------------------------------------------------------------------------  Chief Complaint: ESRD/Ongoing hemodialysis requirement    24 hour events/subjective:  s/p HD yesterday- tolerated well  pt seen and examined; feels well      PAST HISTORY  --------------------------------------------------------------------------------  No significant changes to PMH, PSH, FHx, SHx, unless otherwise noted    ALLERGIES & MEDICATIONS  --------------------------------------------------------------------------------  Allergies  Plavix (Hives)  Toprol-XL (Rash)    Standing Inpatient Medications  apixaban 2.5 milliGRAM(s) Oral every 12 hours  atorvastatin 80 milliGRAM(s) Oral at bedtime  cadexomer iodine 0.9% Gel 1 Application(s) Topical two times a day  chlorhexidine 2% Cloths 1 Application(s) Topical <User Schedule>  chlorhexidine 4% Liquid 1 Application(s) Topical <User Schedule>  hydrocortisone hemorrhoidal Suppository 1 Suppository(s) Rectal every 12 hours  isosorbide   mononitrate ER Tablet (IMDUR) 30 milliGRAM(s) Oral daily  melatonin 3 milliGRAM(s) Oral at bedtime  multivitamin Oral Tab/Cap - Peds 1 Tablet(s) Oral daily  NIFEdipine XL 90 milliGRAM(s) Oral daily  pantoprazole  Injectable 40 milliGRAM(s) IV Push every 12 hours  polyethylene glycol 3350 17 Gram(s) Oral every 12 hours  senna 2 Tablet(s) Oral at bedtime  sevelamer carbonate 800 milliGRAM(s) Oral three times a day  tamsulosin 0.4 milliGRAM(s) Oral at bedtime  torsemide 20 milliGRAM(s) Oral daily  traZODone 50 milliGRAM(s) Oral at bedtime    PRN Inpatient Medications  acetaminophen     Tablet .. 650 milliGRAM(s) Oral every 6 hours PRN  HYDROmorphone   Tablet 2 milliGRAM(s) Oral every 6 hours PRN      REVIEW OF SYSTEMS  --------------------------------------------------------------------------------  Gen: No weight changes, fatigue, fevers/chills, weakness  Skin: No rashes  Head/Eyes/Ears/Mouth: No headache  Respiratory: No dyspnea, cough,  CV: No chest pain, orthopnea  GI: No abdominal pain, diarrhea, constipation, nausea, vomiting,  MSK: No joint pain  Neuro: No dizziness/lightheadedness, weakness  Heme: No bleeding  Psych: No significant nervousness, anxiety, stress, depression    All other systems were reviewed and are negative, except as noted.    VITALS/PHYSICAL EXAM  --------------------------------------------------------------------------------  T(C): 36.9 (05-14-22 @ 04:34), Max: 36.9 (05-14-22 @ 04:34)  HR: 75 (05-14-22 @ 04:34) (57 - 76)  BP: 145/71 (05-14-22 @ 04:34) (145/71 - 170/66)  RR: 20 (05-14-22 @ 04:34) (16 - 20)  SpO2: 92% (05-14-22 @ 04:34) (92% - 100%)  Wt(kg): --        05-13-22 @ 07:01  -  05-14-22 @ 07:00  --------------------------------------------------------  IN: 0 mL / OUT: 650 mL / NET: -650 mL      Physical Exam:  	Gen: NAD  	HEENT- Supple neck  	Pulm: CTA B/L  	CV: RRR, S1S2; no rub  	Abd: +BS, soft, nontender/nondistended  	: No suprapubic tenderness  	UE: Warm,  no edema  	LE: Warm,  edema+  	Neuro: Non focal  	Skin: Warm, pallor  	Vascular access: AVF    LABS/STUDIES  --------------------------------------------------------------------------------              11.8   5.88  >-----------<  174      [05-14-22 @ 08:48]              38.4     139  |  98  |  28.5  ----------------------------<  108      [05-14-22 @ 08:48]  4.3   |  24.0  |  3.07        Ca     9.3     [05-14-22 @ 08:48]      Mg     2.0     [05-14-22 @ 08:48]      Phos  2.6     [05-14-22 @ 08:48]            Iron 43, TIBC 249, %sat 17      [07-13-21 @ 16:07]  Ferritin 498      [07-13-21 @ 16:07]  HbA1c 4.9      [08-09-18 @ 05:54]  Lipid: chol 126, , HDL 60, LDL --      [06-03-21 @ 06:07]    HBsAb <3.0      [04-21-22 @ 22:28]  HBsAg Nonreact      [04-21-22 @ 22:28]  HCV 0.17, Nonreact      [04-21-22 @ 22:28]

## 2022-05-14 NOTE — PROGRESS NOTE ADULT - SUBJECTIVE AND OBJECTIVE BOX
Chief Complaint:  Patient is a 87y old  Male who presents with a chief complaint of gib (14 May 2022 05:46)      Interval Events / Subjective: Patient seen and evaluated at bedside, reporting no complaints. Denies nausea, vomiting, abdominal pain, chest pain, shortness of breath. As per nursing 3 BM dark brown BMs reported for yesterday. Awaiting labs.       REVIEW OF SYSTEMS:   General: Negative  HEENT: Negative  CV: Negative  Respiratory: Negative  GI: See HPI  : Negative  MSK: Negative  Hematologic: Negative  Skin: Negative    MEDICATIONS:   MEDICATIONS  (STANDING):  apixaban 2.5 milliGRAM(s) Oral every 12 hours  atorvastatin 80 milliGRAM(s) Oral at bedtime  cadexomer iodine 0.9% Gel 1 Application(s) Topical two times a day  chlorhexidine 2% Cloths 1 Application(s) Topical <User Schedule>  chlorhexidine 4% Liquid 1 Application(s) Topical <User Schedule>  hydrocortisone hemorrhoidal Suppository 1 Suppository(s) Rectal every 12 hours  isosorbide   mononitrate ER Tablet (IMDUR) 30 milliGRAM(s) Oral daily  melatonin 3 milliGRAM(s) Oral at bedtime  multivitamin Oral Tab/Cap - Peds 1 Tablet(s) Oral daily  NIFEdipine XL 90 milliGRAM(s) Oral daily  pantoprazole  Injectable 40 milliGRAM(s) IV Push every 12 hours  polyethylene glycol 3350 17 Gram(s) Oral every 12 hours  senna 2 Tablet(s) Oral at bedtime  sevelamer carbonate 800 milliGRAM(s) Oral three times a day  tamsulosin 0.4 milliGRAM(s) Oral at bedtime  torsemide 20 milliGRAM(s) Oral daily  traZODone 50 milliGRAM(s) Oral at bedtime    MEDICATIONS  (PRN):  acetaminophen     Tablet .. 650 milliGRAM(s) Oral every 6 hours PRN Temp greater or equal to 38C (100.4F), Mild Pain (1 - 3), Moderate Pain (4 - 6)  HYDROmorphone   Tablet 2 milliGRAM(s) Oral every 6 hours PRN Severe Pain (7 - 10)      ALLERGIES:   Allergies    Plavix (Hives)  Toprol-XL (Rash)    Intolerances        VITAL SIGNS:   Vital Signs Last 24 Hrs  T(C): 36.9 (14 May 2022 04:34), Max: 36.9 (14 May 2022 04:34)  T(F): 98.4 (14 May 2022 04:34), Max: 98.4 (14 May 2022 04:34)  HR: 75 (14 May 2022 04:34) (57 - 76)  BP: 145/71 (14 May 2022 04:34) (145/71 - 170/66)  BP(mean): --  RR: 20 (14 May 2022 04:34) (16 - 20)  SpO2: 92% (14 May 2022 04:34) (92% - 100%)  I&O's Summary    13 May 2022 07:01  -  14 May 2022 07:00  --------------------------------------------------------  IN: 0 mL / OUT: 650 mL / NET: -650 mL        PHYSICAL EXAM:   GENERAL:  Elderly, frail male, in no acute distress  HEENT:  NC/AT,  conjunctivae clear, sclera -anicteric  CHEST:  Full & symmetric excursion, no increased effort, breath sounds clear  HEART:  irregular rhythm, S1, S2, no murmur/rub/S3/S4,  no edema  ABDOMEN:  Soft, non-tender, non-distended, normoactive bowel sounds.   EXTREMITIES: Left LE necrotic  digits, +erythema, No clubbing or edema  SKIN:  No rash/erythema/ecchymoses/petechiae/wounds/abscess/warm/dry  NEURO:  Alert, calm, appropriate affect    LABS:  CBC Full  -  ( 13 May 2022 09:47 )  WBC Count : 5.64 K/uL  RBC Count : 3.33 M/uL  Hemoglobin : 10.4 g/dL  Hematocrit : 33.4 %  Platelet Count - Automated : 156 K/uL  Mean Cell Volume : 100.3 fl  Mean Cell Hemoglobin : 31.2 pg  Mean Cell Hemoglobin Concentration : 31.1 gm/dL  Auto Neutrophil # : x  Auto Lymphocyte # : x  Auto Monocyte # : x  Auto Eosinophil # : x  Auto Basophil # : x  Auto Neutrophil % : x  Auto Lymphocyte % : x  Auto Monocyte % : x  Auto Eosinophil % : x  Auto Basophil % : x    05-13    139  |  96<L>  |  23.6<H>  ----------------------------<  157<H>  4.2   |  27.0  |  2.75<H>    Ca    9.0      13 May 2022 20:21  Phos  2.4     05-13  Mg     2.0     05-13                RADIOLOGY & ADDITIONAL STUDIES (The following images were personally reviewed):

## 2022-05-14 NOTE — CHART NOTE - NSCHARTNOTESELECT_GEN_ALL_CORE
Event Note
Nutrition Services

## 2022-05-14 NOTE — PROGRESS NOTE ADULT - SUBJECTIVE AND OBJECTIVE BOX
Chief Complaint:  Patient is a 87y old  Male who presents with a chief complaint of gib (14 May 2022 05:46)      Interval Events / Subjective: hb stable. no bloody bm overnight. no abd pain. fadia diet .        REVIEW OF SYSTEMS:   General: Negative  HEENT: Negative  CV: Negative  Respiratory: Negative  GI: See HPI  : Negative  MSK: Negative  Hematologic: Negative  Skin: Negative    MEDICATIONS:   MEDICATIONS  (STANDING):  apixaban 2.5 milliGRAM(s) Oral every 12 hours  atorvastatin 80 milliGRAM(s) Oral at bedtime  cadexomer iodine 0.9% Gel 1 Application(s) Topical two times a day  chlorhexidine 2% Cloths 1 Application(s) Topical <User Schedule>  chlorhexidine 4% Liquid 1 Application(s) Topical <User Schedule>  hydrocortisone hemorrhoidal Suppository 1 Suppository(s) Rectal every 12 hours  isosorbide   mononitrate ER Tablet (IMDUR) 30 milliGRAM(s) Oral daily  melatonin 3 milliGRAM(s) Oral at bedtime  multivitamin Oral Tab/Cap - Peds 1 Tablet(s) Oral daily  NIFEdipine XL 90 milliGRAM(s) Oral daily  pantoprazole  Injectable 40 milliGRAM(s) IV Push every 12 hours  polyethylene glycol 3350 17 Gram(s) Oral every 12 hours  senna 2 Tablet(s) Oral at bedtime  sevelamer carbonate 800 milliGRAM(s) Oral three times a day  tamsulosin 0.4 milliGRAM(s) Oral at bedtime  torsemide 20 milliGRAM(s) Oral daily  traZODone 50 milliGRAM(s) Oral at bedtime    MEDICATIONS  (PRN):  acetaminophen     Tablet .. 650 milliGRAM(s) Oral every 6 hours PRN Temp greater or equal to 38C (100.4F), Mild Pain (1 - 3), Moderate Pain (4 - 6)  HYDROmorphone   Tablet 2 milliGRAM(s) Oral every 6 hours PRN Severe Pain (7 - 10)      ALLERGIES:   Allergies    Plavix (Hives)  Toprol-XL (Rash)    Intolerances        VITAL SIGNS:   Vital Signs Last 24 Hrs  T(C): 36.9 (14 May 2022 04:34), Max: 36.9 (14 May 2022 04:34)  T(F): 98.4 (14 May 2022 04:34), Max: 98.4 (14 May 2022 04:34)  HR: 75 (14 May 2022 04:34) (57 - 76)  BP: 145/71 (14 May 2022 04:34) (145/71 - 170/66)  BP(mean): --  RR: 20 (14 May 2022 04:34) (16 - 20)  SpO2: 92% (14 May 2022 04:34) (92% - 100%)  I&O's Summary    13 May 2022 07:01  -  14 May 2022 07:00  --------------------------------------------------------  IN: 0 mL / OUT: 650 mL / NET: -650 mL        PHYSICAL EXAM:   GENERAL:  Elderly, frail male, in no acute distress  HEENT:  NC/AT  CHEST:  Full & symmetric excursion      HEART:  irregular rhythm  ABDOMEN:  Soft, non-tender, non-distended, normoactive bowel sounds.   SKIN:  No rash/erythema  NEURO:  Alert, calm, appropriate affect    LABS:  CBC Full  -  ( 13 May 2022 09:47 )  WBC Count : 5.64 K/uL  RBC Count : 3.33 M/uL  Hemoglobin : 10.4 g/dL  Hematocrit : 33.4 %  Platelet Count - Automated : 156 K/uL  Mean Cell Volume : 100.3 fl  Mean Cell Hemoglobin : 31.2 pg  Mean Cell Hemoglobin Concentration : 31.1 gm/dL  Auto Neutrophil # : x  Auto Lymphocyte # : x  Auto Monocyte # : x  Auto Eosinophil # : x  Auto Basophil # : x  Auto Neutrophil % : x  Auto Lymphocyte % : x  Auto Monocyte % : x  Auto Eosinophil % : x  Auto Basophil % : x    05-13    139  |  96<L>  |  23.6<H>  ----------------------------<  157<H>  4.2   |  27.0  |  2.75<H>    Ca    9.0      13 May 2022 20:21  Phos  2.4     05-13  Mg     2.0     05-13                RADIOLOGY & ADDITIONAL STUDIES (The following images were personally reviewed):

## 2022-05-15 NOTE — PROGRESS NOTE ADULT - ASSESSMENT
87M well known to Vascular Service s/p multiple LE procedures admitted with GIB, with known history of AVMs on Lovenox    - AC restarted, will observe  - Gastro and nephro following up. Eliquis re-started , no new GI bleeds so far  - PT on HD

## 2022-05-15 NOTE — PROGRESS NOTE ADULT - ASSESSMENT
87 yr old male with ESRD on HD, GI bleed secondary to AVMs, CAD, hypertension, PPM, HFpEF, hypertension, hyperlipidemia, CAD, atrial fibrillation, LLE DVT, aortic stenosis s/p TAVR, PAD s/p recent L fem to AT bypass with cryovein c/b acute graft rethrombosis s/p thrombectomy discharged on Lovenox, presented with complaints of BRBPR. Lovenox was discontinued on admission. He developed tachycardia and hypotension, was transferred to medical ICU. Hb was monitored. Vascular surgery, ID, cardiology, GI and nephrology was consulted. Urgent CTA abdomen was done, revealed bleeding around anal canal. Flexible sigmoidoscopy was done by GI, which revealed proctitis and hemorrhoids, Anusol was ordered. Podiatry and ID consulted for left foot dry gangrene, advised local wound care. HD was continued as schedule and he received a total of 2 units of PRBC in ICU. Hematology was consulted, advised starting renally dosed Eliquis given recent vascular interventions and close monitoring of CBC. His CBC in ICU remained stable and he was transferred to step down on 5/12.    Acute blood loss anemia  - Sec internal hemorrhoids  - s/p flex sigmoidoscopy.  - Hb stable post 2 units of PRBC  - Monitor Hb on Eliquis.  - GI recommendations appreciated    PAD s/p recent bypass and thrombectomy  - Bleeding on Lovenox  - Now on Eliquis, renally dosed  - Evaluated by hematology.  - did not tolerate Plavix.  - Vascular surgery recs appreciated, will observe on AC    ESRD on HD  - Continue HD as per schedule.    CAD  - Continue Eliquis, statin.  - Continue Imdur.    HFpEF  - No signs for fluid overload    Atrial fibrillation s/p PPM  - Continue Eliquis.    Epistaxis  - resolved   - monitor for recurrent episodes  - humidified oxygen prn.    DVT ppx  - Eliquis    Dispo: PT evaluation, likely DC 1-2 days if hemoglobin remains stable

## 2022-05-15 NOTE — PROGRESS NOTE ADULT - SUBJECTIVE AND OBJECTIVE BOX
[facility] DIVISION OF KIDNEY DISEASES AND HYPERTENSION -- FOLLOW UP NOTE  --------------------------------------------------------------------------------  Chief Complaint:esrd on hd    24 hour events/subjective:  pt seen and examined; daughter at bedside  pt feels well        PAST HISTORY  --------------------------------------------------------------------------------  No significant changes to PMH, PSH, FHx, SHx, unless otherwise noted    ALLERGIES & MEDICATIONS  --------------------------------------------------------------------------------  Allergies    Plavix (Hives)  Toprol-XL (Rash)        Standing Inpatient Medications  apixaban 2.5 milliGRAM(s) Oral every 12 hours  atorvastatin 80 milliGRAM(s) Oral at bedtime  cadexomer iodine 0.9% Gel 1 Application(s) Topical two times a day  chlorhexidine 2% Cloths 1 Application(s) Topical <User Schedule>  chlorhexidine 4% Liquid 1 Application(s) Topical <User Schedule>  hydrocortisone hemorrhoidal Suppository 1 Suppository(s) Rectal every 12 hours  isosorbide   mononitrate ER Tablet (IMDUR) 30 milliGRAM(s) Oral daily  melatonin 3 milliGRAM(s) Oral at bedtime  multivitamin Oral Tab/Cap - Peds 1 Tablet(s) Oral daily  NIFEdipine XL 90 milliGRAM(s) Oral daily  pantoprazole  Injectable 40 milliGRAM(s) IV Push every 12 hours  polyethylene glycol 3350 17 Gram(s) Oral every 12 hours  senna 2 Tablet(s) Oral at bedtime  sevelamer carbonate 800 milliGRAM(s) Oral three times a day  tamsulosin 0.4 milliGRAM(s) Oral at bedtime  torsemide 20 milliGRAM(s) Oral daily  traZODone 50 milliGRAM(s) Oral at bedtime    PRN Inpatient Medications  acetaminophen     Tablet .. 650 milliGRAM(s) Oral every 6 hours PRN  HYDROmorphone   Tablet 2 milliGRAM(s) Oral every 6 hours PRN      REVIEW OF SYSTEMS  --------------------------------------------------------------------------------  Gen: No weight changes, fatigue, fevers/chills, weakness  Skin: No rashes  Head/Eyes/Ears/Mouth: No headache; Normal hearing; Normal vision w/o blurriness; No sinus pain/discomfort, sore throat  Respiratory: No dyspnea, cough, wheezing, hemoptysis  CV: No chest pain, PND, orthopnea  GI: No abdominal pain, diarrhea, constipation, nausea, vomiting, melena, hematochezia  : No increased frequency, dysuria, hematuria, nocturia  MSK: No joint pain/swelling; no back pain; no edema  Neuro: No dizziness/lightheadedness, weakness, seizures, numbness, tingling  Heme: No easy bruising or bleeding  Endo: No heat/cold intolerance  Psych: No significant nervousness, anxiety, stress, depression    All other systems were reviewed and are negative, except as noted.    VITALS/PHYSICAL EXAM  --------------------------------------------------------------------------------  T(C): 36.3 (05-15-22 @ 16:10), Max: 36.9 (05-15-22 @ 06:00)  HR: 62 (05-15-22 @ 16:10) (62 - 78)  BP: 127/60 (05-15-22 @ 16:10) (115/49 - 142/61)  RR: 18 (05-15-22 @ 16:10) (18 - 18)  SpO2: 98% (05-15-22 @ 16:10) (91% - 98%)  Wt(kg): --        05-14-22 @ 07:01  -  05-15-22 @ 07:00  --------------------------------------------------------  IN: 290 mL / OUT: 500 mL / NET: -210 mL    05-15-22 @ 07:01  -  05-15-22 @ 17:41  --------------------------------------------------------  IN: 300 mL / OUT: 0 mL / NET: 300 mL      Physical Exam:  	Gen: NAD  	HEENT:, supple neck  	Pulm: CTA B/L  	CV: RRR, S1S2; no rub  	Abd: +BS, soft, nontender/nondistended  	: No suprapubic tenderness  	UE: Warm, no edema  	LE: Warm, left leg edema+  	Neuro: No focal deficit  	Psych: Normal affect and mood  	Skin: Warm  	Vascular access: BARBRA TONEY    LABS/STUDIES  --------------------------------------------------------------------------------              10.5   5.55  >-----------<  183      [05-15-22 @ 05:41]              34.1     137  |  99  |  35.6  ----------------------------<  87      [05-15-22 @ 05:41]  4.4   |  25.0  |  3.86        Ca     9.1     [05-15-22 @ 05:41]      Mg     2.0     [05-14-22 @ 08:48]      Phos  2.6     [05-14-22 @ 08:48]            Creatinine Trend:  SCr 3.86 [05-15 @ 05:41]  SCr 3.07 [05-14 @ 08:48]  SCr 2.75 [05-13 @ 20:21]  SCr 3.97 [05-13 @ 09:47]  SCr 3.22 [05-12 @ 04:25]        Iron 43, TIBC 249, %sat 17      [07-13-21 @ 16:07]  Ferritin 498      [07-13-21 @ 16:07]  HbA1c 4.9      [08-09-18 @ 05:54]  Lipid: chol 126, , HDL 60, LDL --      [06-03-21 @ 06:07]    HBsAb <3.0      [04-21-22 @ 22:28]  HBsAg Nonreact      [04-21-22 @ 22:28]  HCV 0.17, Nonreact      [04-21-22 @ 22:28]

## 2022-05-15 NOTE — PROGRESS NOTE ADULT - SUBJECTIVE AND OBJECTIVE BOX
Chief complaint: GIB    Patient seen and examined at bedside. No acute overnight events reported. No headache, fever, chills, cough, chest pain, shortness of breath, nausea or vomiting.     Vital Signs Last 24 Hrs  T(F): 98.5 (15 May 2022 08:42), Max: 98.5 (15 May 2022 08:42)  HR: 76 (15 May 2022 08:42) (66 - 78)  BP: 142/61 (15 May 2022 08:42) (121/60 - 142/65)  RR: 18 (15 May 2022 08:42) (18 - 18)  SpO2: 98% (15 May 2022 08:42) (91% - 98%)    Physical Exam:  Constitutional: alert and oriented, in no acute distress   Neck: Soft and supple  Respiratory: Clear to auscultation bilaterally, no wheezes or crackles  Cardiovascular: Regular rate and rhyhtm, no murmurs, gallops, rubs  Gastrointestinal: Soft, non-tender to palpation, +bs  Musculoskeletal: no edema b/l    Labs:                        10.5   5.55  )-----------( 183      ( 15 May 2022 05:41 )             34.1   05-15    137  |  99  |  35.6<H>  ----------------------------<  87  4.4   |  25.0  |  3.86<H>    Ca    9.1      15 May 2022 05:41  Phos  2.6     05-14  Mg     2.0     05-14

## 2022-05-15 NOTE — PROGRESS NOTE ADULT - SUBJECTIVE AND OBJECTIVE BOX
Acute Care Surgery/Trauma Surgery Progress Note:    No acute overnight events. Patient afebrile, and hemodynamically well. Pain well controlled. Tolerating diet. Denies n/v/f/c/cp/sob.     Diet, Renal Restrictions:   For patients receiving Renal Replacement - No Protein Restr, No Conc K, No Conc Phos, Low Sodium  Supplement Feeding Modality:  Oral  Nepro Cans or Servings Per Day:  1       Frequency:  Daily (05-10-22 @ 12:30)      Scheduled Medications:   apixaban 2.5 milliGRAM(s) Oral every 12 hours  atorvastatin 80 milliGRAM(s) Oral at bedtime  cadexomer iodine 0.9% Gel 1 Application(s) Topical two times a day  chlorhexidine 2% Cloths 1 Application(s) Topical <User Schedule>  chlorhexidine 4% Liquid 1 Application(s) Topical <User Schedule>  hydrocortisone hemorrhoidal Suppository 1 Suppository(s) Rectal every 12 hours  isosorbide   mononitrate ER Tablet (IMDUR) 30 milliGRAM(s) Oral daily  melatonin 3 milliGRAM(s) Oral at bedtime  multivitamin Oral Tab/Cap - Peds 1 Tablet(s) Oral daily  NIFEdipine XL 90 milliGRAM(s) Oral daily  pantoprazole  Injectable 40 milliGRAM(s) IV Push every 12 hours  polyethylene glycol 3350 17 Gram(s) Oral every 12 hours  senna 2 Tablet(s) Oral at bedtime  sevelamer carbonate 800 milliGRAM(s) Oral three times a day  tamsulosin 0.4 milliGRAM(s) Oral at bedtime  torsemide 20 milliGRAM(s) Oral daily  traZODone 50 milliGRAM(s) Oral at bedtime    PRN Medications:  acetaminophen     Tablet .. 650 milliGRAM(s) Oral every 6 hours PRN Temp greater or equal to 38C (100.4F), Mild Pain (1 - 3), Moderate Pain (4 - 6)  HYDROmorphone   Tablet 2 milliGRAM(s) Oral every 6 hours PRN Severe Pain (7 - 10)      Objective:   T(F): 98.3 (05-14 @ 21:25), Max: 98.4 (05-14 @ 04:34)  HR: 66 (05-14 @ 21:25) (66 - 75)  BP: 121/60 (05-14 @ 21:25) (121/60 - 145/71)  BP(mean): --  ABP: --  ABP(mean): --  RR: 18 (05-14 @ 21:25) (18 - 20)  SpO2: 98% (05-14 @ 21:25) (92% - 98%)      Physical Exam:   GEN: patient resting comfortably in bed, in no acute distress  RESP: respirations are unlabored, no accessory muscle use, no conversational dyspnea  CVS: RRR  GI: Abdomen soft, non-tender, non-distended, no rebound tenderness / guarding  Extremities:  Palpable DP pulse 2+ bilaterally. Well healed groin incision. Left leg wounds without signs of infection, dry gangrene of toes    I&O's    05-13 @ 07:01  -  05-14 @ 07:00  --------------------------------------------------------  IN:  Total IN: 0 mL    OUT:    Other (mL): 500 mL    Voided (mL): 150 mL  Total OUT: 650 mL    Total NET: -650 mL      05-14 @ 07:01  -  05-15 @ 04:00  --------------------------------------------------------  IN:    Oral Fluid: 290 mL  Total IN: 290 mL    OUT:  Total OUT: 0 mL    Total NET: 290 mL          LABS:                        11.8   5.88  )-----------( 174      ( 14 May 2022 08:48 )             38.4     05-14    139  |  98  |  28.5<H>  ----------------------------<  108<H>  4.3   |  24.0  |  3.07<H>    Ca    9.3      14 May 2022 08:48  Phos  2.6     05-14  Mg     2.0     05-14            MICROBIOLOGY:       PATHOLOGY:

## 2022-05-16 NOTE — PROGRESS NOTE ADULT - SUBJECTIVE AND OBJECTIVE BOX
[facility]                              [address] [address]                                                 PHONE: [phone]                                                 FAX: [phone]  -----------------------------------------------------------------------------------------------  Pt seen and examined. FU for  shortness of breath    Overnight events/Complaints: Pt seen in HD. Doing well.    Vital Signs Last 24 Hrs  T(C): 36.8 (16 May 2022 08:29), Max: 37 (16 May 2022 05:20)  T(F): 98.2 (16 May 2022 08:29), Max: 98.6 (16 May 2022 05:20)  HR: 69 (16 May 2022 08:29) (62 - 76)  BP: 147/52 (16 May 2022 08:29) (115/49 - 147/52)  BP(mean): 82 (15 May 2022 16:10) (82 - 82)  RR: 18 (16 May 2022 08:29) (18 - 18)  SpO2: 96% (16 May 2022 08:29) (94% - 99%)  I&O's Summary    15 May 2022 07:01  -  16 May 2022 07:00  --------------------------------------------------------  IN: 1020 mL / OUT: 350 mL / NET: 670 mL      PHYSICAL EXAM:  Constitutional: Appears well; alert and co-operative  HEENT:     Head: Normocephalic and atraumatic  Neck: supple. No JVD  Cardiovascular: regular S1 S2  Respiratory: Lungs clear to auscultation; no crepitations, no wheeze  Gastrointestinal:  Soft, Non-tender, + BS	  Musculoskeletal: Normal range of motion. No edema  Skin: Warm and dry. No cyanosis . No diaphoresis.  Neurologic: Alert oriented to time place and person. Normal strength and no tremor.        LABS:                        10.9   6.52  )-----------( 167      ( 16 May 2022 07:10 )             35.3     05-16    141  |  101  |  53.4<H>  ----------------------------<  127<H>  4.7   |  25.0  |  4.67<H>    Ca    9.0      16 May 2022 07:10    RADIOLOGY & ADDITIONAL STUDIES: (reviewed)  CXR was independently visualized/reviewed and demonstrated:  Increasing infiltrates. No acute bony finding of the left   foot.    CARDIOLOGY TESTING:(reviewed)     ECG (Independent visualization): Paced    ECHOCARDIOGRAM :  Summary:   1. Left ventricular ejection fraction, by visual estimation, is 60 to   65%.   2. Normal global left ventricular systolic function.  3. Moderate to severe left atrial enlargement.   4. Abnormal septal motion consistent with post-operative status.   5. Mildly increased LV wall thickness.   6. Mild to moderately enlarged right atrium.   7. Moderate mitral valve regurgitation.   8. Thickening and calcification of the anterior and posterior mitral   valve leaflets.   9. Transmitral mean gradient is 13.4 mmHg at HR 89 bpm.  10. Mild-moderate tricuspid regurgitation.  11. 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 Gradients suggestive of normally   functioning prosthetic valve. No significant change from prior study   dated 11/2021.  12. Estimated pulmonary artery systolic pressure is 66.4 mmHg assuming a   right atrial pressure of 3 mmHg, which is consistent with severe   pulmonary hypertension.  13. Compared to prior TTE study dated 11/2021, no significant interval   changes have occurred.    MEDICATIONS:(reviewed)  MEDICATIONS  (STANDING):  apixaban 2.5 milliGRAM(s) Oral every 12 hours  atorvastatin 80 milliGRAM(s) Oral at bedtime  cadexomer iodine 0.9% Gel 1 Application(s) Topical two times a day  chlorhexidine 2% Cloths 1 Application(s) Topical <User Schedule>  chlorhexidine 4% Liquid 1 Application(s) Topical <User Schedule>  epoetin juan-epbx (RETACRIT) Injectable 27558 Unit(s) IV Push <User Schedule>  hydrocortisone hemorrhoidal Suppository 1 Suppository(s) Rectal every 12 hours  isosorbide   mononitrate ER Tablet (IMDUR) 30 milliGRAM(s) Oral daily  melatonin 3 milliGRAM(s) Oral at bedtime  multivitamin Oral Tab/Cap - Peds 1 Tablet(s) Oral daily  NIFEdipine XL 90 milliGRAM(s) Oral daily  pantoprazole  Injectable 40 milliGRAM(s) IV Push every 12 hours  polyethylene glycol 3350 17 Gram(s) Oral every 12 hours  senna 2 Tablet(s) Oral at bedtime  sevelamer carbonate 800 milliGRAM(s) Oral three times a day  tamsulosin 0.4 milliGRAM(s) Oral at bedtime  torsemide 20 milliGRAM(s) Oral daily  traZODone 50 milliGRAM(s) Oral at bedtime      ASSESSMENT AND PLAN:    88y Male with past medical history significant for ESRD on HD PAF off AC due to GIBs, leadless PPM, ILR in place, non obstructive CAD with normal EF, AS s/p TAVR, ESRD on HD, PAD s/p RLE fem-pop bypass, w/ revised LLE fem-bypass in march of 2021, complicated by LLE SFA and pop artery occlusion w/ DP and AT runoff s/p L fem to AT bypass w/ cryovein on 4/13/22.    Volume status stable on HD  H/H stable. Tolerating low dose Eliquis so far.  BP stable

## 2022-05-16 NOTE — PROGRESS NOTE ADULT - ASSESSMENT
Patient was seen and evaluated on dialysis.   Patient is tolerating the procedure well.   T(C): 37 (05-17-22 @ 04:21), Max: 37 (05-17-22 @ 04:21)  HR: 76 (05-17-22 @ 04:21) (58 - 76)  BP: 148/67 (05-17-22 @ 04:21) (122/54 - 148/67)  Continue dialysis: TIW  Dialyzer: Revaclear  300          QB:  400 ml.,       QD: 500ml.,  Goal UF _2 L _ over _3.5 _ Hours     Pt is seen and examined on dialysis. No symptoms. Hemodynamics stable. Tolerating dialysis and ultrafiltration.  Pre Laboratory values personally reviewed by me.  Dialysis adjusted appropriately based on current values.  Will continue the current medical management.  Next hemodialysis as scheduled.  Discussed with nursing, primary care team.

## 2022-05-16 NOTE — PROGRESS NOTE ADULT - SUBJECTIVE AND OBJECTIVE BOX
Pt seen and examined. FU for  shortness of breath    Overnight events/Complaints: Pt seen in HD. Doing well.    Vital Signs Last 24 Hrs,    T(C): 36.8 (16 May 2022 08:29), Max: 37 (16 May 2022 05:20)  T(F): 98.2 (16 May 2022 08:29), Max: 98.6 (16 May 2022 05:20)  HR: 69 (16 May 2022 08:29) (62 - 76)  BP: 147/52 (16 May 2022 08:29) (115/49 - 147/52)  BP(mean): 82 (15 May 2022 16:10) (82 - 82)  RR: 18 (16 May 2022 08:29) (18 - 18)  SpO2: 96% (16 May 2022 08:29) (94% - 99%)    I&O's Summary    15 May 2022 07:01  -  16 May 2022 07:00  --------------------------------------------------------  IN: 1020 mL / OUT: 350 mL / NET: 670 mL    PHYSICAL EXAM:  Constitutional: Appears well; alert and co-operative  HEENT:     Head: Normocephalic and atraumatic  Neck: supple. No JVD  Cardiovascular: regular S1 S2  Respiratory: Lungs clear to auscultation; no crepitations, no wheeze  Gastrointestinal:  Soft, Non-tender, + BS	  Musculoskeletal: Normal range of motion. No edema  Skin: Warm and dry. No cyanosis . No diaphoresis.  Neurologic: Alert oriented to time place and person. Normal strength and no tremor.        LABS:                        10.9   6.52  )-----------( 167      ( 16 May 2022 07:10 )             35.3     05-16    141  |  101  |  53.4<H>  ----------------------------<  127<H>  4.7   |  25.0  |  4.67<H>    Ca    9.0      16 May 2022 07:10    RADIOLOGY & ADDITIONAL STUDIES: (reviewed)  CXR was independently visualized/reviewed and demonstrated:  Increasing infiltrates. No acute bony finding of the left   foot.    CARDIOLOGY TESTING:(reviewed)     ECG (Independent visualization): Paced    ECHOCARDIOGRAM :  Summary:   1. Left ventricular ejection fraction, by visual estimation, is 60 to   65%.   2. Normal global left ventricular systolic function.  3. Moderate to severe left atrial enlargement.   4. Abnormal septal motion consistent with post-operative status.   5. Mildly increased LV wall thickness.   6. Mild to moderately enlarged right atrium.   7. Moderate mitral valve regurgitation.   8. Thickening and calcification of the anterior and posterior mitral   valve leaflets.   9. Transmitral mean gradient is 13.4 mmHg at HR 89 bpm.  10. Mild-moderate tricuspid regurgitation.  11. 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 Gradients suggestive of normally   functioning prosthetic valve. No significant change from prior study   dated 11/2021.  12. Estimated pulmonary artery systolic pressure is 66.4 mmHg assuming a   right atrial pressure of 3 mmHg, which is consistent with severe   pulmonary hypertension.  13. Compared to prior TTE study dated 11/2021, no significant interval   changes have occurred.    MEDICATIONS:(reviewed)    apixaban 2.5 milliGRAM(s) Oral every 12 hours  atorvastatin 80 milliGRAM(s) Oral at bedtime  cadexomer iodine 0.9% Gel 1 Application(s) Topical two times a day  chlorhexidine 2% Cloths 1 Application(s) Topical <User Schedule>  chlorhexidine 4% Liquid 1 Application(s) Topical <User Schedule>  epoetin juan-epbx (RETACRIT) Injectable 30810 Unit(s) IV Push <User Schedule>  hydrocortisone hemorrhoidal Suppository 1 Suppository(s) Rectal every 12 hours  isosorbide   mononitrate ER Tablet (IMDUR) 30 milliGRAM(s) Oral daily  melatonin 3 milliGRAM(s) Oral at bedtime  multivitamin Oral Tab/Cap - Peds 1 Tablet(s) Oral daily  NIFEdipine XL 90 milliGRAM(s) Oral daily  pantoprazole  Injectable 40 milliGRAM(s) IV Push every 12 hours  polyethylene glycol 3350 17 Gram(s) Oral every 12 hours  senna 2 Tablet(s) Oral at bedtime  sevelamer carbonate 800 milliGRAM(s) Oral three times a day  tamsulosin 0.4 milliGRAM(s) Oral at bedtime  torsemide 20 milliGRAM(s) Oral daily  traZODone 50 milliGRAM(s) Oral at bedtime    ASSESSMENT AND PLAN:    88y Male with past medical history significant for ESRD on HD PAF off AC due to GIBs, leadless PPM, ILR in place, non obstructive CAD with normal EF, AS s/p TAVR, ESRD on HD, PAD s/p RLE fem-pop bypass, w/ revised LLE fem-bypass in march of 2021, complicated by LLE SFA and pop artery occlusion w/ DP and AT runoff s/p L fem to AT bypass w/ cryovein on 4/13/22.    Volume status stable on HD,  Well Ultrafiltered,   H/H stable. Tolerating low dose Eliquis so far.  BP stable    d/W RN & Daughter,

## 2022-05-16 NOTE — PROGRESS NOTE ADULT - SUBJECTIVE AND OBJECTIVE BOX
Chief complaint: GIB    Patient seen and examined at bedside. No acute overnight events reported. No fever, chills, cough, chest pain, nausea or vomiting.     Vital Signs Last 24 Hrs  T(F): 98.2 (16 May 2022 08:29), Max: 98.6 (16 May 2022 05:20)  HR: 69 (16 May 2022 08:29) (62 - 76)  BP: 147/52 (16 May 2022 08:29) (115/49 - 147/52)  RR: 18 (16 May 2022 08:29) (18 - 18)  SpO2: 96% (16 May 2022 08:29) (94% - 99%)    Physical Exam:  Constitutional: alert and oriented, in no acute distress   Neck: Soft and supple  Respiratory: Clear to auscultation bilaterally, no wheezes or crackles  Cardiovascular: Irregular rhythm  Gastrointestinal: Soft, non-tender to palpation, +bs  Vascular: 2+ peripheral pulses    Labs:                        10.9   6.52  )-----------( 167      ( 16 May 2022 07:10 )             35.3   05-16    141  |  101  |  53.4<H>  ----------------------------<  127<H>  4.7   |  25.0  |  4.67<H>    Ca    9.0      16 May 2022 07:10

## 2022-05-16 NOTE — PROGRESS NOTE ADULT - ASSESSMENT
87 yr old male with ESRD on HD, GI bleed secondary to AVMs, CAD, hypertension, PPM, HFpEF, hypertension, hyperlipidemia, CAD, atrial fibrillation, LLE DVT, aortic stenosis s/p TAVR, PAD s/p recent L fem to AT bypass with cryovein c/b acute graft rethrombosis s/p thrombectomy discharged on Lovenox, presented with complaints of BRBPR. Lovenox was discontinued on admission. He developed tachycardia and hypotension, was transferred to medical ICU. Hb was monitored. Vascular surgery, ID, cardiology, GI and nephrology was consulted. Urgent CTA abdomen was done, revealed bleeding around anal canal. Flexible sigmoidoscopy was done by GI, which revealed proctitis and hemorrhoids, Anusol was ordered. Podiatry and ID consulted for left foot dry gangrene, advised local wound care. HD was continued as schedule and he received a total of 2 units of PRBC in ICU. Hematology was consulted, advised starting renally dosed Eliquis given recent vascular interventions and close monitoring of CBC. His CBC in ICU remained stable and he was transferred to step down on 5/12.    Acute blood loss anemia  - Sec internal hemorrhoids  - s/p flex sigmoidoscopy.  - Hb stable post 2 units of PRBC  - Monitor Hb on Eliquis.  - GI recommendations appreciated    PAD s/p recent bypass and thrombectomy  - Bleeding on Lovenox  - Now on Eliquis, renally dosed  - Evaluated by hematology.  - did not tolerate Plavix.  - Vascular surgery recs appreciated, will observe on AC    ESRD on HD  - Continue HD as per schedule.    CAD  - Continue Eliquis, statin.  - Continue Imdur.    HFpEF  - No signs for fluid overload    Atrial fibrillation s/p PPM  - Continue Eliquis.    Epistaxis  - resolved   - monitor for recurrent episodes  - humidified oxygen prn.    DVT ppx  - Eliquis    Dispo: DC home 1-2 days if hemoglobin remains stable

## 2022-05-17 NOTE — DISCHARGE NOTE PROVIDER - NSDCFUSCHEDAPPT_GEN_ALL_CORE_FT
[facility]  [facility] [address]  Scheduled Appointment: 06/15/2022

## 2022-05-17 NOTE — DISCHARGE NOTE PROVIDER - CARE PROVIDERS DIRECT ADDRESSES
[email or web address]

## 2022-05-17 NOTE — DISCHARGE NOTE PROVIDER - CARE PROVIDER_API CALL
Bay Avendano)  Internal Medicine; Nephrology  [address] [place] [address]  Phone: [phone]  Fax: [phone]  Follow Up Time: 1 week    Raphael Abraham)  Gastroenterology; Internal Medicine  [address] [facility], [facility] [address]  [place] [address]  Phone: [phone]  Fax: [phone]  Follow Up Time: 1 week

## 2022-05-17 NOTE — DISCHARGE NOTE PROVIDER - DETAILS OF MALNUTRITION DIAGNOSIS/DIAGNOSES
This patient has been assessed with a concern for Malnutrition and was treated during this hospitalization for the following Nutrition diagnosis/diagnoses:     -  05/11/2022: Severe protein-calorie malnutrition

## 2022-05-17 NOTE — DISCHARGE NOTE NURSING/CASE MANAGEMENT/SOCIAL WORK - PATIENT PORTAL LINK FT
You can access the FollowMyHealth Patient Portal offered by [facility] by registering at the following website: [email or web address]. By joining [employer]’s FollowMyHealth portal, you will also be able to view your health information using other applications (apps) compatible with our system.

## 2022-05-17 NOTE — DISCHARGE NOTE PROVIDER - ATTENDING DISCHARGE PHYSICAL EXAMINATION:
Vital Signs Last 24 Hrs  T(F): 98.2 (17 May 2022 10:09), Max: 98.6 (17 May 2022 04:21)  HR: 64 (17 May 2022 13:17) (60 - 76)  BP: 144/75 (17 May 2022 13:17) (122/54 - 148/67)  RR: 17 (17 May 2022 10:09) (17 - 18)  SpO2: 100% (17 May 2022 10:09) (98% - 100%)    Physical Exam:  Constitutional: alert and oriented, in no acute distress   Neck: Soft and supple  Respiratory: Clear to auscultation bilaterally, no wheezes or crackles  Cardiovascular: Regular rate and rhythm, no murmurs, gallops, rubs  Gastrointestinal: Soft, non-tender to palpation, +bs  Vascular: 2+ peripheral pulses  Neurological: A/O x 3, no focal neurological deficits  Musculoskeletal: 5/5 strength b/l upper and lower extremities, no lower extremity edema bilaterally

## 2022-05-17 NOTE — DISCHARGE NOTE NURSING/CASE MANAGEMENT/SOCIAL WORK - NSDCPEFALRISK_GEN_ALL_CORE
For information on Fall & Injury Prevention, visit: [email or web address] OR  [email or web address] OR  https://www.cdc.gov/steadi/patient.html

## 2022-05-17 NOTE — PROGRESS NOTE ADULT - NUTRITIONAL ASSESSMENT
This patient has been assessed with a concern for Malnutrition and has been determined to have a diagnosis/diagnoses of Severe protein-calorie malnutrition.    This patient is being managed with:   Diet Renal Restrictions-  For patients receiving Renal Replacement - No Protein Restr No Conc K No Conc Phos Low Sodium  Supplement Feeding Modality:  Oral  Nepro Cans or Servings Per Day:  1       Frequency:  Daily  Entered: May 10 2022 12:30PM    
This patient has been assessed with a concern for Malnutrition and has been determined to have a diagnosis/diagnoses of Severe protein-calorie malnutrition.    This patient is being managed with:   Diet Renal Restrictions-  For patients receiving Renal Replacement - No Protein Restr No Conc K No Conc Phos Low Sodium  Supplement Feeding Modality:  Oral  Nepro Cans or Servings Per Day:  1       Frequency:  Daily  Entered: May 10 2022 12:30PM    
This patient has been assessed with a concern for Malnutrition and has been determined to have a diagnosis/diagnoses of Severe protein-calorie malnutrition.    This patient is being managed with:   Diet Renal Restrictions-  For patients receiving Renal Replacement - No Protein Restr No Conc K No Conc Phos Low Sodium  Supplement Feeding Modality:  Oral  Nepro Cans or Servings Per Day:  1       Frequency:  Three Times a day  Entered: May 15 2022  1:26PM    
This patient has been assessed with a concern for Malnutrition and has been determined to have a diagnosis/diagnoses of Severe protein-calorie malnutrition.    This patient is being managed with:   Diet Renal Restrictions-  For patients receiving Renal Replacement - No Protein Restr No Conc K No Conc Phos Low Sodium  Supplement Feeding Modality:  Oral  Nepro Cans or Servings Per Day:  1       Frequency:  Daily  Entered: May 10 2022 12:30PM    
This patient has been assessed with a concern for Malnutrition and has been determined to have a diagnosis/diagnoses of Severe protein-calorie malnutrition.    This patient is being managed with:   Diet Renal Restrictions-  For patients receiving Renal Replacement - No Protein Restr No Conc K No Conc Phos Low Sodium  Supplement Feeding Modality:  Oral  Nepro Cans or Servings Per Day:  1       Frequency:  Three Times a day  Entered: May 15 2022  1:26PM    
This patient has been assessed with a concern for Malnutrition and has been determined to have a diagnosis/diagnoses of Severe protein-calorie malnutrition.    This patient is being managed with:   Diet Renal Restrictions-  For patients receiving Renal Replacement - No Protein Restr No Conc K No Conc Phos Low Sodium  Supplement Feeding Modality:  Oral  Nepro Cans or Servings Per Day:  1       Frequency:  Daily  Entered: May 10 2022 12:30PM

## 2022-05-17 NOTE — PROGRESS NOTE ADULT - ASSESSMENT
87M well known to Vascular Service s/p multiple LE procedures admitted with GIB, with known history of AVMs on Lovenox    - Eliquis re-started , no new GI bleeds so far.  - Wound care  with adaptic and Kerlix No compression bands.  - HD in AM,     Maintain TW As OP,   - Discharge planing

## 2022-05-17 NOTE — PROGRESS NOTE ADULT - ASSESSMENT
87M well known to Vascular Service s/p multiple LE procedures admitted with GIB, with known history of AVMs on Lovenox    - AC restarted, will observe  - Gastro and nephro following up. Eliquis re-started , no new GI bleeds so far.  -Wound care to be done with adaptic and kerlix. No compression bands.  - PT on HD  - Discharge planing

## 2022-05-17 NOTE — DISCHARGE NOTE PROVIDER - PROVIDER TOKENS
PROVIDER:[TOKEN:[[id number]],FOLLOWUP:[1 week]],PROVIDER:[TOKEN:[[id number]],FOLLOWUP:[1 week]]

## 2022-05-17 NOTE — DISCHARGE NOTE PROVIDER - HOSPITAL COURSE
87 yr old male with ESRD on HD, GI bleed secondary to AVMs, CAD, hypertension, PPM, HFpEF, hypertension, hyperlipidemia, CAD, atrial fibrillation, LLE DVT, aortic stenosis s/p TAVR, PAD s/p recent L fem to AT bypass with cryovein c/b acute graft rethrombosis s/p thrombectomy discharged on Lovenox, presented with complaints of BRBPR. Lovenox was discontinued on admission. He developed tachycardia and hypotension, was transferred to medical ICU. Hb was monitored. Vascular surgery, ID, cardiology, GI and nephrology was consulted. Urgent CTA abdomen was done, revealed bleeding around anal canal. Flexible sigmoidoscopy was done by GI, which revealed proctitis and hemorrhoids, Anusol was ordered. Podiatry and ID consulted for left foot dry gangrene, advised local wound care. HD was continued as schedule and he received a total of 2 units of PRBC in ICU. Hematology was consulted, advised starting renally dosed Eliquis given recent vascular interventions and close monitoring of CBC. His CBC in ICU remained stable and he was transferred to step down on 5/12. The patient's hemoglobin remained stable. The patient is hemodynamicalyl stable for discharge with appropriate follow up.

## 2022-05-17 NOTE — PROGRESS NOTE ADULT - REASON FOR ADMISSION
gib

## 2022-05-17 NOTE — DISCHARGE NOTE PROVIDER - NSDCMRMEDTOKEN_GEN_ALL_CORE_FT
acetaminophen 325 mg oral tablet: 3 tab(s) orally every 6 hours, As needed, Mild Pain (1 - 3)  apixaban 2.5 mg oral tablet: 1 tab(s) orally every 12 hours  atorvastatin 80 mg oral tablet: 1 tab(s) orally once a day (at bedtime)  cadexomer iodine 0.9% topical gel: 1 application topically 2 times a day  hydrocortisone 25 mg rectal suppository: 1 suppository(ies) rectal every 12 hours  isosorbide mononitrate 30 mg oral tablet, extended release: 1 tab(s) orally once a day  Melatonin 3 mg oral tablet: 1 tab(s) orally once a day (at bedtime)  Nephro-Thomas oral tablet: 1 tab(s) orally once a day  NIFEdipine 90 mg oral tablet, extended release: 1 tab(s) orally once a day  pantoprazole 40 mg oral delayed release tablet: 1 tab(s) orally 2 times a day  polyethylene glycol 3350 oral powder for reconstitution: 17 gram(s) orally every 12 hours  senna oral tablet: 2 tab(s) orally once a day (at bedtime)  sevelamer hydrochloride 800 mg oral tablet: 2 tab(s) orally 3 times a day  Super B Complex: 1 tab(s) orally once a day  tamsulosin 0.4 mg oral capsule: 1 cap(s) orally once a day (at bedtime)  torsemide 20 mg oral tablet: 1 tab(s) orally on non HD days  traZODone 50 mg oral tablet: 1 tab(s) orally once a day (at bedtime)

## 2022-05-17 NOTE — PROGRESS NOTE ADULT - SUBJECTIVE AND OBJECTIVE BOX
Patient seen and examined    I&O's Summary    16 May 2022 07:01  -  17 May 2022 07:00  --------------------------------------------------------  IN: 2000 mL / OUT: 0 mL / NET: 2000 mL    REVIEW OF SYSTEMS: Patient is lethargic, D/W RN - ? Hypoglycemia,     Vital Signs Last 24 Hrs  T(C): 36.8 (17 May 2022 10:09), Max: 37 (17 May 2022 04:21)  T(F): 98.2 (17 May 2022 10:09), Max: 98.6 (17 May 2022 04:21)  HR: 67 (17 May 2022 10:09) (58 - 76)  BP: 137/88 (17 May 2022 10:09) (122/54 - 148/67)  RR: 17 (17 May 2022 10:09) (17 - 18)  SpO2: 100% (17 May 2022 10:09) (98% - 100%)    PHYSICAL EXAM:    GENERAL: Lethargic,  In Bed, Pale, In NAD, Frail,   EYES:  conjunctiva and sclera clear  NECK: Supple, No JVD/Bruit  NERVOUS SYSTEM:  Lethargic, No Focal Or Lateralizing deficits,   CHEST:  CTA ,No rales or rhonchi  HEART:  RRR, + SE  murmur,   ABDOMEN: Soft, NT/ND BS+  EXTREMITIES:  No Edema;  SKIN: No rashes    LABS:                        10.9   6.52  )-----------( 167      ( 16 May 2022 07:10 )             35.3     05-16    141  |  101  |  53.4<H>  ----------------------------<  127<H>  4.7   |  25.0  |  4.67<H>    Ca    9.0      16 May 2022 07:10    MEDICATIONS  (STANDING):  acetaminophen     Tablet .. PRN  apixaban  atorvastatin  cadexomer iodine 0.9% Gel  chlorhexidine 2% Cloths  chlorhexidine 4% Liquid  epoetin juan-epbx (RETACRIT) Injectable  hydrocortisone hemorrhoidal Suppository  HYDROmorphone   Tablet PRN  isosorbide   mononitrate ER Tablet (IMDUR)  melatonin  multivitamin Oral Tab/Cap - Peds  NIFEdipine XL  pantoprazole  Injectable  polyethylene glycol 3350  senna  sevelamer carbonate  tamsulosin  torsemide  traZODone

## 2022-05-17 NOTE — PROGRESS NOTE ADULT - PROVIDER SPECIALTY LIST ADULT
Cardiology
Cardiology
Gastroenterology
Hospitalist
Cardiology
Gastroenterology
Hospitalist
MICU
Nephrology
Nephrology
Vascular Surgery
Hospitalist
Nephrology
Vascular Surgery
Cardiology
Gastroenterology
Hospitalist
Infectious Disease
Nephrology
Palliative Care
Palliative Care
Vascular Surgery
Hospitalist
Gastroenterology
MICU
MICU

## 2022-05-17 NOTE — PROGRESS NOTE ADULT - SUBJECTIVE AND OBJECTIVE BOX
Acute Care Surgery/Trauma Surgery Progress Note:  No acute overnight events. Patient afebrile, VSS. Pain well controlled. Tolerating diet. Denies n/v/f/c/cp/sob.     Diet, Renal Restrictions:   For patients receiving Renal Replacement - No Protein Restr, No Conc K, No Conc Phos, Low Sodium  Supplement Feeding Modality:  Oral  Nepro Cans or Servings Per Day:  1       Frequency:  Three Times a day (05-15-22 @ 13:26)      Scheduled Medications:   apixaban 2.5 milliGRAM(s) Oral every 12 hours  atorvastatin 80 milliGRAM(s) Oral at bedtime  cadexomer iodine 0.9% Gel 1 Application(s) Topical two times a day  chlorhexidine 2% Cloths 1 Application(s) Topical <User Schedule>  chlorhexidine 4% Liquid 1 Application(s) Topical <User Schedule>  epoetin juan-epbx (RETACRIT) Injectable 28886 Unit(s) IV Push <User Schedule>  hydrocortisone hemorrhoidal Suppository 1 Suppository(s) Rectal every 12 hours  isosorbide   mononitrate ER Tablet (IMDUR) 30 milliGRAM(s) Oral daily  melatonin 3 milliGRAM(s) Oral at bedtime  multivitamin Oral Tab/Cap - Peds 1 Tablet(s) Oral daily  NIFEdipine XL 90 milliGRAM(s) Oral daily  pantoprazole  Injectable 40 milliGRAM(s) IV Push every 12 hours  polyethylene glycol 3350 17 Gram(s) Oral every 12 hours  senna 2 Tablet(s) Oral at bedtime  sevelamer carbonate 800 milliGRAM(s) Oral three times a day  tamsulosin 0.4 milliGRAM(s) Oral at bedtime  torsemide 20 milliGRAM(s) Oral daily  traZODone 50 milliGRAM(s) Oral at bedtime    PRN Medications:  acetaminophen     Tablet .. 650 milliGRAM(s) Oral every 6 hours PRN Temp greater or equal to 38C (100.4F), Mild Pain (1 - 3), Moderate Pain (4 - 6)  HYDROmorphone   Tablet 2 milliGRAM(s) Oral every 6 hours PRN Severe Pain (7 - 10)      Objective:   T(F): 98.6 (05-17 @ 04:21), Max: 98.6 (05-17 @ 04:21)  HR: 76 (05-17 @ 04:21) (58 - 76)  BP: 148/67 (05-17 @ 04:21) (122/54 - 148/67)  BP(mean): --  ABP: --  ABP(mean): --  RR: 18 (05-17 @ 04:21) (18 - 18)  SpO2: 98% (05-17 @ 04:21) (96% - 100%)      Physical Exam:   GEN: patient resting comfortably in bed, in no acute distress  RESP: respirations are unlabored, no accessory muscle use, no conversational dyspnea  CVS: RRR  GI: Abdomen soft, non-tender, non-distended, no rebound tenderness / guarding  Extremities:  Palpable DP pulse 2+ bilaterally. Well healed groin incision. Left leg wounds without signs of infection, dry gangrene of toes  Neuro: Asleep    I&O's    05-16 @ 07:01  -  05-17 @ 07:00  --------------------------------------------------------  IN:    Other (mL): 2000 mL  Total IN: 2000 mL    OUT:  Total OUT: 0 mL    Total NET: 2000 mL          LABS:                        10.9   6.52  )-----------( 167      ( 16 May 2022 07:10 )             35.3     05-16    141  |  101  |  53.4<H>  ----------------------------<  127<H>  4.7   |  25.0  |  4.67<H>    Ca    9.0      16 May 2022 07:10            MICROBIOLOGY:       PATHOLOGY:

## 2022-05-17 NOTE — DISCHARGE NOTE PROVIDER - NSDCCPCAREPLAN_GEN_ALL_CORE_FT
PRINCIPAL DISCHARGE DIAGNOSIS  Diagnosis: Lower GI bleed  Assessment and Plan of Treatment: - Sec internal hemorrhoids  - s/p flex sigmoidoscopy.  - Hb stable post 2 units of PRBC

## 2022-05-20 NOTE — DIETITIAN INITIAL EVALUATION ADULT. - MUSCLE MASS (LOSS OF MUSCLE)

## 2022-05-26 PROBLEM — K92.2 GI BLEED: Status: ACTIVE | Noted: 2022-01-01

## 2022-05-31 PROBLEM — Z71.89 ACP (ADVANCE CARE PLANNING): Status: ACTIVE | Noted: 2022-01-01

## 2022-05-31 PROBLEM — I48.91 ATRIAL FIBRILLATION, UNSPECIFIED TYPE: Status: ACTIVE | Noted: 2018-07-23

## 2022-05-31 PROBLEM — N18.9 ANEMIA IN CHRONIC KIDNEY DISEASE: Status: ACTIVE | Noted: 2017-06-16

## 2022-05-31 PROBLEM — N18.6 ESRD (END STAGE RENAL DISEASE) ON DIALYSIS: Status: ACTIVE | Noted: 2020-07-07

## 2022-05-31 PROBLEM — I77.0 AVF (ARTERIOVENOUS FISTULA): Status: ACTIVE | Noted: 2017-06-16

## 2022-05-31 PROBLEM — I73.9 PAD (PERIPHERAL ARTERY DISEASE): Status: ACTIVE | Noted: 2020-05-19

## 2022-05-31 PROBLEM — I96 DRY GANGRENE: Status: ACTIVE | Noted: 2022-01-01

## 2022-05-31 PROBLEM — I25.10 CAD (CORONARY ARTERY DISEASE): Status: ACTIVE | Noted: 2018-07-23

## 2022-05-31 PROBLEM — T81.89XA NON-HEALING SURGICAL WOUND: Status: ACTIVE | Noted: 2022-01-01

## 2022-05-31 NOTE — ED PROVIDER NOTE - CRITICAL CARE ATTENDING CONTRIBUTION TO CARE
Upon my evaluation, this patient had a high probability of imminent or life-threatening deterioration due to hemorrhagic shock which required my direct attention, intervention, and personal management.  The patient has a  medical condition that impairs one or more vital organ systems.  Frequent personal assessment and adjustment of medical interventions was performed.      I have personally provided 45 minutes of critical care time exclusive of time spent on separately billable procedures. Time includes review of laboratory data, radiology results, discussion with consultants, patient and family; monitoring for potential decompensation, as well as time spent retrieving data and reviewing the chart and documenting the visit. Interventions were performed as documented above.

## 2022-05-31 NOTE — ED ADULT NURSE NOTE - NSIMPLEMENTINTERV_GEN_ALL_ED
Implemented All Fall with Harm Risk Interventions:  Walker to call system. Call bell, personal items and telephone within reach. Instruct patient to call for assistance. Room bathroom lighting operational. Non-slip footwear when patient is off stretcher. Physically safe environment: no spills, clutter or unnecessary equipment. Stretcher in lowest position, wheels locked, appropriate side rails in place. Provide visual cue, wrist band, yellow gown, etc. Monitor gait and stability. Monitor for mental status changes and reorient to person, place, and time. Review medications for side effects contributing to fall risk. Reinforce activity limits and safety measures with patient and family. Provide visual clues: red socks.

## 2022-05-31 NOTE — ED PROVIDER NOTE - PROGRESS NOTE DETAILS
Juvencio YAÑEZ: emergent release blood ordered. 2U running. Introducer placed to R femoral. Mental status improving. Vascular surgery at bedside.

## 2022-05-31 NOTE — ED CLERICAL - NS ED CLERK NOTE PRE-ARRIVAL INFORMATION; ADDITIONAL PRE-ARRIVAL INFORMATION

## 2022-05-31 NOTE — ED PROVIDER NOTE - CLINICAL SUMMARY MEDICAL DECISION MAKING FREE TEXT BOX
Patient with hemorrhagic shock 2/2 active bleeding from LLE graft. Surgicell/pressure dressing applied. Given 2U PRBC emergent release. Mental status improving. Patient admitted to SICU, will go to OR.

## 2022-05-31 NOTE — ED PROVIDER NOTE - PHYSICAL EXAMINATION
Gen: ill appearing unresponsive pale male in acute distress  Head: NC/AT  Neck: trachea midline  Resp:  CTAB  CV: RRR  GI: soft, NTND, brown stool  Ext: LLE with active brisk bleeding. Surgicell and pressure dressing applied with improvement.  Neuro:  Unresponsive  Skin:  Pale

## 2022-05-31 NOTE — PATIENT PROFILE ADULT - FALL HARM RISK - HARM RISK INTERVENTIONS

## 2022-05-31 NOTE — CONSULT NOTE ADULT - SUBJECTIVE AND OBJECTIVE BOX
[facility] DIVISION OF KIDNEY DISEASES AND HYPERTENSION -- INITIAL CONSULT NOTE  --------------------------------------------------------------------------------  HPI:    ''88M history of ESRD on HD, GI bleed secondary to AVMs, CAD, hypertension, PPM, HFpEF, hypertension, hyperlipidemia, CAD, atrial fibrillation, LLE DVT, aortic stenosis s/p TAVR, PAD s/p recent L fem to AT bypass with cryovein c/b acute graft rethrombosis s/p thrombectomy discharged on Lovenox, presented with complaints of BRBPR. Lovenox was discontinued at that admission, stabilized and transitioned to oral anticoagulation on Eliquis. At home earlier today, was found down at home with bleeding from the left leg where prior cryo vein was anastomosed to AT artery. Family reports likely 3-4 units of blood pooled on the floor, was hypotensive to 80s when EMS arrived and soaked through initial dressing. Was non-responsive on arrival. ED placed a cordis and transfused 2 units emergency release with IVF. Given additional 1 of FFP. Patient mentation improved with SBP increasing to 110s. Emergently to OR for operative ligation of bleeding cryovein Fem-AT bypass''.    Above appreciated  nephrology consulted for ESRD history.      PAST HISTORY  --------------------------------------------------------------------------------  PAST MEDICAL & SURGICAL HISTORY:  DM (diabetes mellitus)  - resolved    AV block, 1st degree    Arrhythmia    CAD (coronary artery disease)    HTN (hypertension)    VT (ventricular tachycardia)    RICHARDS (dyspnea on exertion)    Anemia    Risk factors for obstructive sleep apnea    ESRD on dialysis  HD on Mondays and Fridays    Aortic stenosis  - s/p valve replacement    GI bleeding  due to ulcerated polyps and colonic AVMs    H/O circumcision  at  age  65    H/O left knee surgery    H/O angioplasty  2013,  no  intervention    A-V fistula  left arm 5/2017    H/O carotid endarterectomy  Right    S/P TAVR (transcatheter aortic valve replacement)    History of loop recorder    FAMILY HISTORY:  Family history of cancer (Grandparent)    Family history of lung cancer (Grandparent)    Family history of premature CAD    FH: type 2 diabetes    PAST SOCIAL HISTORY: , lives at home    ALLERGIES & MEDICATIONS  --------------------------------------------------------------------------------  Allergies    Plavix (Hives)  Toprol-XL (Rash)      Standing Inpatient Medications  acetaminophen   IVPB .. 1000 milliGRAM(s) IV Intermittent every 6 hours  influenza  Vaccine (HIGH DOSE) 0.7 milliLiter(s) IntraMuscular once  isosorbide   mononitrate ER Tablet (IMDUR) 30 milliGRAM(s) Oral daily  melatonin 3 milliGRAM(s) Oral at bedtime  multiple electrolytes Injection Type 1 1000 milliLiter(s) IV Continuous <Continuous>  Nephro-pito 1 Tablet(s) Oral daily  NIFEdipine XL 90 milliGRAM(s) Oral daily  pantoprazole    Tablet 40 milliGRAM(s) Oral every 12 hours  polyethylene glycol 3350 17 Gram(s) Oral every 12 hours  senna 2 Tablet(s) Oral at bedtime  sevelamer carbonate 800 milliGRAM(s) Oral three times a day with meals  tamsulosin 0.4 milliGRAM(s) Oral at bedtime  traZODone 50 milliGRAM(s) Oral at bedtime    PRN Inpatient Medications  HYDROmorphone  Injectable 0.5 milliGRAM(s) IV Push every 3 hours PRN  HYDROmorphone  Injectable 0.5 milliGRAM(s) IV Push every 3 hours PRN      REVIEW OF SYSTEMS  --------------------------------------------------------------------------------  Gen: No weight changes, fatigue, fevers/chills, weakness  Skin: No rashes  Head/Eyes/Ears/Mouth: No headache; Normal hearing; Normal vision w/o blurriness; No sinus pain/discomfort, sore throat  Respiratory: No dyspnea, cough, wheezing, hemoptysis  CV: No chest pain, PND, orthopnea  GI: No abdominal pain, diarrhea, constipation, nausea, vomiting, melena, hematochezia  : No increased frequency, dysuria, hematuria, nocturia  MSK: No joint pain/swelling; no back pain; no edema  Neuro: No dizziness/lightheadedness, weakness, seizures, numbness, tingling  Heme: No easy bruising or bleeding  Endo: No heat/cold intolerance  Psych: No significant nervousness, anxiety, stress, depression    All other systems were reviewed and are negative, except as noted.    VITALS/PHYSICAL EXAM  --------------------------------------------------------------------------------  T(C): 36.4 (05-31-22 @ 12:00), Max: 36.5 (05-31-22 @ 08:30)  HR: 62 (05-31-22 @ 14:00) (62 - 76)  BP: 107/42 (05-31-22 @ 14:00) (85/46 - 124/61)  RR: 17 (05-31-22 @ 14:00) (13 - 24)  SpO2: 99% (05-31-22 @ 14:00) (93% - 100%)  Wt(kg): --  Height (cm): 165 (05-31-22 @ 09:00)  Weight (kg): 66.2 (05-31-22 @ 09:00)  BMI (kg/m2): 24.3 (05-31-22 @ 09:00)  BSA (m2): 1.73 (05-31-22 @ 09:00)      Physical Exam:  	Gen: NAD, well-appearing  	HEENT: PERRL, supple neck, clear oropharynx  	Pulm: CTA B/L  	CV: RRR, S1S2; no rub  	Back: No spinal or CVA tenderness; no sacral edema  	Abd: +BS, soft, nontender/nondistended  	: No suprapubic tenderness  	UE: Warm, FROM, no clubbing, intact strength; no edema; no asterixis  	LE: Warm, FROM, no clubbing, intact strength; no edema  	Neuro: No focal deficits, intact gait  	Psych: Normal affect and mood  	Skin: Warm, without rashes  	Vascular access:    LABS/STUDIES  --------------------------------------------------------------------------------              8.2    14.69 >-----------<  178      [05-31-22 @ 09:41]              24.5     138  |  96  |  36.5  ----------------------------<  113      [05-31-22 @ 09:41]  3.8   |  27.0  |  3.04        Ca     7.8     [05-31-22 @ 09:41]      Mg     1.6     [05-31-22 @ 09:41]      Phos  4.6     [05-31-22 @ 09:41]    TPro  5.1  /  Alb  2.9  /  TBili  0.9  /  DBili  x   /  AST  463  /  ALT  326  /  AlkPhos  193  [05-31-22 @ 09:41]    PT/INR: PT 21.7 , INR 1.86       [05-31-22 @ 09:41]  PTT: 34.0       [05-31-22 @ 09:41]          [05-31-22 @ 09:41]    Creatinine Trend:  SCr 3.04 [05-31 @ 09:41]  SCr 2.86 [05-31 @ 05:08]  SCr 4.67 [05-16 @ 07:10]  SCr 3.86 [05-15 @ 05:41]  SCr 3.07 [05-14 @ 08:48]    Urinalysis - [06-04-21 @ 07:54]      Color Yellow / Appearance Clear / SG 1.010 / pH 8.0      Gluc Negative / Ketone Negative  / Bili Negative / Urobili Negative       Blood Large / Protein 100 / Leuk Est Negative / Nitrite Negative      RBC 3-5 / WBC 3-5 / Hyaline  / Gran  / Sq Epi  / Non Sq Epi Negative / Bacteria Negative      Iron 43, TIBC 249, %sat 17      [07-13-21 @ 16:07]  Ferritin 498      [07-13-21 @ 16:07]  HbA1c 4.9      [08-09-18 @ 05:54]  Lipid: chol 126, , HDL 60, LDL --      [06-03-21 @ 06:07]    HBsAb <3.0      [04-21-22 @ 22:28]  HBsAb Nonreact      [06-17-21 @ 11:47]  HBsAg Nonreact      [05-16-22 @ 11:20]  HBcAb Nonreact      [06-25-20 @ 02:07]  HCV 0.17, Nonreact      [04-21-22 @ 22:28]     [facility] DIVISION OF KIDNEY DISEASES AND HYPERTENSION -- INITIAL CONSULT NOTE  --------------------------------------------------------------------------------  HPI:    ''88M history of ESRD on HD, GI bleed secondary to AVMs, CAD, hypertension, PPM, HFpEF, hypertension, hyperlipidemia, CAD, atrial fibrillation, LLE DVT, aortic stenosis s/p TAVR, PAD s/p recent L fem to AT bypass with cryovein c/b acute graft rethrombosis s/p thrombectomy discharged on Lovenox, presented with complaints of BRBPR. Lovenox was discontinued at that admission, stabilized and transitioned to oral anticoagulation on Eliquis. At home earlier today, was found down at home with bleeding from the left leg where prior cryo vein was anastomosed to AT artery. Family reports likely 3-4 units of blood pooled on the floor, was hypotensive to 80s when EMS arrived and soaked through initial dressing. Was non-responsive on arrival. ED placed a cordis and transfused 2 units emergency release with IVF. Given additional 1 of FFP. Patient mentation improved with SBP increasing to 110s. Emergently to OR for operative ligation of bleeding cryovein Fem-AT bypass''.    Above appreciated  nephrology consulted for ESRD history.  Pt seen and examined; daughter alfonzo at bedside.  Pt is now s/p 'Ligation of artery of extremity left femoral to anterior tibial bypass graaft (cryovein) '  s/p albumin infusion, now on Normosol gtt.  Pt is confused and unable to obtain history/ ros      PAST HISTORY  --------------------------------------------------------------------------------  PAST MEDICAL & SURGICAL HISTORY:  DM (diabetes mellitus)  - resolved    AV block, 1st degree    Arrhythmia    CAD (coronary artery disease)    HTN (hypertension)    VT (ventricular tachycardia)    RICHARDS (dyspnea on exertion)    Anemia    Risk factors for obstructive sleep apnea    ESRD on dialysis  HD on Mondays and Fridays    Aortic stenosis  - s/p valve replacement    GI bleeding  due to ulcerated polyps and colonic AVMs    H/O circumcision  at  age  65    H/O left knee surgery    H/O angioplasty  2013,  no  intervention    A-V fistula  left arm 5/2017    H/O carotid endarterectomy  Right    S/P TAVR (transcatheter aortic valve replacement)    History of loop recorder    FAMILY HISTORY:  Family history of cancer (Grandparent)    Family history of lung cancer (Grandparent)    Family history of premature CAD    FH: type 2 diabetes    PAST SOCIAL HISTORY: , lives at home    ALLERGIES & MEDICATIONS  --------------------------------------------------------------------------------  Allergies    Plavix (Hives)  Toprol-XL (Rash)      Standing Inpatient Medications  acetaminophen   IVPB .. 1000 milliGRAM(s) IV Intermittent every 6 hours  influenza  Vaccine (HIGH DOSE) 0.7 milliLiter(s) IntraMuscular once  isosorbide   mononitrate ER Tablet (IMDUR) 30 milliGRAM(s) Oral daily  melatonin 3 milliGRAM(s) Oral at bedtime  multiple electrolytes Injection Type 1 1000 milliLiter(s) IV Continuous <Continuous>  Nephro-pito 1 Tablet(s) Oral daily  NIFEdipine XL 90 milliGRAM(s) Oral daily  pantoprazole    Tablet 40 milliGRAM(s) Oral every 12 hours  polyethylene glycol 3350 17 Gram(s) Oral every 12 hours  senna 2 Tablet(s) Oral at bedtime  sevelamer carbonate 800 milliGRAM(s) Oral three times a day with meals  tamsulosin 0.4 milliGRAM(s) Oral at bedtime  traZODone 50 milliGRAM(s) Oral at bedtime    PRN Inpatient Medications  HYDROmorphone  Injectable 0.5 milliGRAM(s) IV Push every 3 hours PRN  HYDROmorphone  Injectable 0.5 milliGRAM(s) IV Push every 3 hours PRN      REVIEW OF SYSTEMS  --------------------------------------------------------------------------------  limited by confusion    VITALS/PHYSICAL EXAM  --------------------------------------------------------------------------------  T(C): 36.4 (05-31-22 @ 12:00), Max: 36.5 (05-31-22 @ 08:30)  HR: 62 (05-31-22 @ 14:00) (62 - 76)  BP: 107/42 (05-31-22 @ 14:00) (85/46 - 124/61)  RR: 17 (05-31-22 @ 14:00) (13 - 24)  SpO2: 99% (05-31-22 @ 14:00) (93% - 100%)  Wt(kg): --  Height (cm): 165 (05-31-22 @ 09:00)  Weight (kg): 66.2 (05-31-22 @ 09:00)  BMI (kg/m2): 24.3 (05-31-22 @ 09:00)  BSA (m2): 1.73 (05-31-22 @ 09:00)      Physical Exam:  	Gen: awake  	HEENT: on supplemental o2  	Pulm: CTA B/L  	CV: RRR, S1S2; no rub  	Abd: +BS, soft, nontender/nondistended  	: No suprapubic tenderness  	UE: Warm; no edema  	LE: Warm, left leg edema++  	Neuro: Non focal  	Psych: confused  	Skin: Warm, pallor+  	Vascular access: AVF  LABS/STUDIES  --------------------------------------------------------------------------------              8.2    14.69 >-----------<  178      [05-31-22 @ 09:41]              24.5     138  |  96  |  36.5  ----------------------------<  113      [05-31-22 @ 09:41]  3.8   |  27.0  |  3.04        Ca     7.8     [05-31-22 @ 09:41]      Mg     1.6     [05-31-22 @ 09:41]      Phos  4.6     [05-31-22 @ 09:41]    TPro  5.1  /  Alb  2.9  /  TBili  0.9  /  DBili  x   /  AST  463  /  ALT  326  /  AlkPhos  193  [05-31-22 @ 09:41]    PT/INR: PT 21.7 , INR 1.86       [05-31-22 @ 09:41]  PTT: 34.0       [05-31-22 @ 09:41]          [05-31-22 @ 09:41]    Creatinine Trend:  SCr 3.04 [05-31 @ 09:41]  SCr 2.86 [05-31 @ 05:08]  SCr 4.67 [05-16 @ 07:10]  SCr 3.86 [05-15 @ 05:41]  SCr 3.07 [05-14 @ 08:48]    Urinalysis - [06-04-21 @ 07:54]      Color Yellow / Appearance Clear / SG 1.010 / pH 8.0      Gluc Negative / Ketone Negative  / Bili Negative / Urobili Negative       Blood Large / Protein 100 / Leuk Est Negative / Nitrite Negative      RBC 3-5 / WBC 3-5 / Hyaline  / Gran  / Sq Epi  / Non Sq Epi Negative / Bacteria Negative      Iron 43, TIBC 249, %sat 17      [07-13-21 @ 16:07]  Ferritin 498      [07-13-21 @ 16:07]  HbA1c 4.9      [08-09-18 @ 05:54]  Lipid: chol 126, , HDL 60, LDL --      [06-03-21 @ 06:07]    HBsAb <3.0      [04-21-22 @ 22:28]  HBsAb Nonreact      [06-17-21 @ 11:47]  HBsAg Nonreact      [05-16-22 @ 11:20]  HBcAb Nonreact      [06-25-20 @ 02:07]  HCV 0.17, Nonreact      [04-21-22 @ 22:28]

## 2022-05-31 NOTE — PATIENT PROFILE ADULT - DO YOU NEED ADDITIONAL SERVICES TO MANAGE ANY OF THESE MEDICAL CONDITIONS AT HOME?
Subjective   Patient ID: Tracey is a 16 year old female who is accompanied by:mother     Well Child Assessment:  History was provided by the mother. Tracey lives with her mother, father and sister.   Nutrition  Types of intake include vegetables, meats, fruits, cow's milk, eggs and fish.   Dental  The patient has a dental home. The patient brushes teeth regularly. The patient flosses regularly. Last dental exam was less than 6 months ago.   Elimination  Elimination problems do not include constipation or urinary symptoms.   Sleep  Average sleep duration is 8 hours. The patient does not snore. There are no sleep problems.   Safety  There is no smoking in the home. Home has working smoke alarms? yes. Home has working carbon monoxide alarms? yes.   School  Current grade level is 11th. There are no signs of learning disabilities. Child is doing well in school.   Screening  There are no risk factors related to alcohol. There are no risk factors related to drugs. There are no risk factors related to tobacco.   Social  The caregiver enjoys the child. After school, the child is at home with a parent or an after school program (gymnastics). Sibling interactions are good. The child spends 2 hours in front of a screen (tv or computer) per day.       HPI  Additional concerns today: None     Review of Systems   Respiratory: Negative for snoring.    Gastrointestinal: Negative for constipation.   Psychiatric/Behavioral: Negative for sleep disturbance.   All other systems reviewed and are negative.      Patient's medications, allergies, past medical, surgical, social and family histories were reviewed and updated as appropriate.    Objective   Vitals: BP 90/64   Pulse 72   Temp 97.9 °F (36.6 °C) (Temporal)   Ht 5' 4.75\" (1.645 m)   Wt 50.3 kg (111 lb)   LMP 06/07/2021 (Approximate)   BMI 18.61 kg/m²   BSA 1.54 m²   Growth parameters are noted and are appropriate for age.    Physical Exam  Vitals reviewed.   Constitutional:        Appearance: Normal appearance. She is well-developed.   HENT:      Head: Normocephalic and atraumatic.      Right Ear: Tympanic membrane, ear canal and external ear normal.      Left Ear: Tympanic membrane, ear canal and external ear normal.      Nose: Nose normal.      Mouth/Throat:      Mouth: Mucous membranes are moist.      Pharynx: Oropharynx is clear.   Eyes:      Extraocular Movements: Extraocular movements intact.      Conjunctiva/sclera: Conjunctivae normal.      Pupils: Pupils are equal, round, and reactive to light.   Neck:      Thyroid: No thyromegaly.   Cardiovascular:      Rate and Rhythm: Normal rate and regular rhythm.      Heart sounds: Normal heart sounds. No murmur heard.     Pulmonary:      Effort: Pulmonary effort is normal.      Breath sounds: Normal breath sounds.   Abdominal:      General: Bowel sounds are normal.      Palpations: Abdomen is soft. There is no mass.      Hernia: No hernia is present.   Musculoskeletal:         General: No deformity. Normal range of motion.      Cervical back: Normal range of motion and neck supple.   Lymphadenopathy:      Cervical: No cervical adenopathy.   Skin:     General: Skin is warm.      Findings: No rash.   Neurological:      General: No focal deficit present.      Mental Status: She is alert.      Cranial Nerves: No cranial nerve deficit.      Motor: No abnormal muscle tone.      Coordination: Coordination normal.      Deep Tendon Reflexes: Reflexes normal.   Psychiatric:         Mood and Affect: Mood normal.         Behavior: Behavior normal.       Bear Stage 5    Assessment   Problem List Items Addressed This Visit     None      Visit Diagnoses     Well adolescent visit    -  Primary    Need for vaccination        Relevant Orders    HEPATITIS A VACCINE PEDIATRIC / ADOLESCENT 2 DOSE IM    MENINGOCOCCAL CONJUGATE MCV4O VACC (MENVEO)          Counseling  Nutrition/Weight Management:  Assessment and discussion of current Nutrition behaviors  performed:  Yes  Assessment and discussion of current Physical Activity behaviors performed: Yes    Immunizations: per orders.  History of previous adverse reactions to immunizations? no  Immunizations given today: Yes - Immunizations given today and vaccine counseling including benefits, risks, and adverse reactions were provided by myself during the visit.    Discussed car safety, sunscreen use, avoidance of smoking/alcohol/drugs, screen time vs studying.  Addressed parent/patient questions/concerns.    Follow-up yearly for a well visit, or sooner as needed.   no

## 2022-05-31 NOTE — ED PROVIDER NOTE - OBJECTIVE STATEMENT
88M h/o anemia, ESRD on HD (M/F), GIB, HTN, PAD s/p recent L fem to AT bypass with cryovein c/b acute graft rethrombosis s/p thrombectomy on eliquis presents with active bleeding from LLE graft. Bleeding at home, large amount, EMS estimates 2-3 units. Patient unresponsive and hypotensive on arrival.

## 2022-05-31 NOTE — CONSULT NOTE ADULT - ASSESSMENT
ESRD on HD  PAD s/p recent L fem to AT bypass using cryovein on Eliquis now with hemorrhagic shock  s/p ligation of artery  plan for possible amputation   HD tomorrow- consent obtained  PRBC transfusion with HD to keep Hb > 9  AMY with dialysis  Bladder scan now    d/w SICU PA

## 2022-05-31 NOTE — H&P ADULT - HISTORY OF PRESENT ILLNESS
88M history of ESRD on HD, GI bleed secondary to AVMs, CAD, hypertension, PPM, HFpEF, hypertension, hyperlipidemia, CAD, atrial fibrillation, LLE DVT, aortic stenosis s/p TAVR, PAD s/p recent L fem to AT bypass with cryovein c/b acute graft rethrombosis s/p thrombectomy discharged on Lovenox, presented with complaints of BRBPR. Lovenox was discontinued at that admission, stabilized and transitioned to oral anticoagulation on Eliquis. At home earlier today, was found down at home with bleeding from the left leg where prior cryovein was anastamosed to AT artery. Family reports likely 3-4 units of blood pooled on the floor, was hypotensive to 80s when EMS arrived and soaked through initial dressing. Was non-responsive on arrival. ED placed a cordis and transfused 2units emergency release with IVF. Given additional 1 of FFP. Patient mentation improved with SBP increasing to 110s. Emergently to OR for operative ligation of bleeding cryovein Fem-AT bypass

## 2022-05-31 NOTE — ED ADULT NURSE NOTE - BREATHING, MLM
1) Bilateral Pleural Effusions  2) Abnormal CT Chest  3) History of Bronchomalacia  4) COPD  5) Dyspnea  6) History of Heart Failure   7) Passive Atelectasis/Compressive Atelectasis   8) Ascites  9) History of Lymphoma  10)persistent exudative effusion after drainage    80 year old male with pmh of copd on home o2 2.5L, afib on coumadin, chronic diastolic chf, diabetes on insulin, htn, hep b infection, hypogammaglobulinemia w/ IVIG tx, b cell lymphoma, cholelithiasis s/p cholecystectomy, esophageal dilation, prostatectomy coming to ED with complaints of worsening shortness of breath and lower back pain. Patient stating was on toilet this morning when tried to get up put pants on when he had a sudden onset of lower back pain.   Reviewed CT chest which shows bilateral pleural effusions L> R.  Once INR is reduced to < 2, can consider thoracentesis  Appreciate CTS assistance  Follow up pleural LDH, cytology, cell count, protein, glucose, triglycerides should also be performed given history of lymphoma.   Performed bronchoscopy on the patient last year which revealed bronchomalacia which is likely contributing to the patient's underlying dyspnea as well  Follow up echocardiogram  Patient may need paracentesis with fluid analysis  Follow up hematology recommendations   Continue O2, goal SaO2 >88%  Will d/c Singulair given potential underlying hepatic issues.  Start Symbicort and Spiriva   s/p pleurex done  Will continue to monitor  get repeat cxr  send PF for analysis / ordered    CT drainage noted  he is at high risk for endoscopy due to compromised pulm status  family is aware of status  decrease diuretic dose  Dc IVf -done  not walking / generalized weakness  family to decide on rehab placement  wife and DTR updated today regarding status  check repeat cxr after drainage  overall prognosis remains guarded Spontaneous, unlabored and symmetrical

## 2022-05-31 NOTE — ED PROVIDER NOTE - WET READ LAUNCH FT
Called pt back, left detailed message indicating taking amoxicillin is safe to take.  
Patient was put on amoxicillin this morning due to toncil stone/ear pain by her PCP.  She is 38weeks pregnant today - PCP wanted her to let OB Doctor be aware of this.  
There are no Wet Read(s) to document.

## 2022-05-31 NOTE — ED ADULT NURSE NOTE - OBJECTIVE STATEMENT
Assumed care of pt in CC. Pt presents to ED w/ daughter at bedside c/o LLE bleeding from bypass graft site. As per daughter, pt lost coupious amount of blood PTA. Emergency release of 2U PRBC's initiated by MD Henning. MD Henning at bedside for intervention.

## 2022-05-31 NOTE — CHART NOTE - NSCHARTNOTEFT_GEN_A_CORE
Central line Removal:    - Pt placed in supine position  - Dressing removed  - 4 securing sutures removed  - Cordis Central line catheter removed  - Pressure applied for ~ 30 minutes with recent Eliquis use  - hemostasis achieved  - Sterile occlusive dressing applied  - Pt tolerated well  - No complications

## 2022-05-31 NOTE — CONSULT NOTE ADULT - ASSESSMENT
Assessment and Plan:    87 yo Male with PMH of ESRD on HD, GI bleed secondary to AVMs, CAD, hypertension, PPM, HFpEF, hypertension, hyperlipidemia, CAD, atrial fibrillation, LLE DVT, aortic stenosis s/p TAVR, PAD s/p recent L fem to AT bypass with cryovein c/b acute graft rethrombosis s/p thrombectomy presenting with hemorrhagic shock s/p rupture of cryovein Fem-AT bypass s/p ligation of graft    Neuro:   - Multimodal pain control   - delirium precautions  - Optimize sleep / wake cycle  - daily sedation holidays    CV: Hemorrhagic shock  - Received 2 unit PRBC and 1 FFP with resolution of shock  - Serial POCUS   - TTE   - Continue hemodynamic monitoring  - Maintain MAP > 65    Pulm:   - Incentive spirometry  - Pulmonary toilet  - OOB to chair    GI/Nutrition:   - Monitor bowel function and continue serial abdominal exams  - continue bowel reg    /Renal:  ESRD  - Nephrology consulted and will perform dialysis as scheduled  - monitor kidney fxn    ID:  - Monitor fever curve  - Leukocytosis    Endo:  - monitor blood glucose    Skin:   - repositioning for DTI prevention while in bed    Heme/DVT Prophylaxis:  - SCDs    Extremity:    - S/p ligation of cryovein Fem-AT bypass  - Monitor for limb ischemia  - F/up vascular plan for possible amputation  - Pt with dry gangrene to toes on the LLE foot    Dispo:  - Admit to SICU

## 2022-05-31 NOTE — H&P ADULT - ATTENDING COMMENTS
Patient with bleeding from L fem-AT bypass graft this AM presents in hemorrhagic shock  Stabilized and transfused  Mentation has improved  No pressor requirement  Proceed to OR for graft ligation  Patient's daughter signed consent and is aware patient is at high risk for limb loss

## 2022-05-31 NOTE — ED ADULT TRIAGE NOTE - CHIEF COMPLAINT QUOTE
JENNIFER with reports of ruptured skin graft to L leg. Dr. Casarez at bedside for patient care. JENNIFER who reports ruptured skin graft to L leg, bleeding controlled with pressure dressing, responsive to pain, skin dusky, breathing labored, pt hypotensive with BP 85/48. Dr. Henning and nursing at bedside for patient care.

## 2022-05-31 NOTE — ED ADULT NURSE NOTE - CHIEF COMPLAINT QUOTE
JENNIFER who reports ruptured skin graft to L leg, bleeding controlled with pressure dressing, responsive to pain, skin dusky, breathing labored, pt hypotensive with BP 85/48. Dr. Henning and nursing at bedside for patient care.

## 2022-05-31 NOTE — H&P ADULT - NSHPPHYSICALEXAM_GEN_ALL_CORE
GEN: NAD, laying in bed, appears stated age  HEENT: NCAT, clear oral mucosa, normal conjunctiva  Chest: non-tender  CV:  non-tachycardic, 2+ radial pulse  Pulm: no increased work of breathing, no conversational dyspnea  GI: soft nontender  LUE AVF with dressing in place, thrill  MSK: moving all extremities   LLE with dry gangrene of toes, bluish discoloration of metatarsal region. edematous and cool to touch. Compression dressing wrapped circumfirentially around the leg

## 2022-05-31 NOTE — H&P ADULT - ASSESSMENT
88M known to vascular surgery with ESRD on HD, GI bleed secondary to AVMs, CAD, hypertension, PPM, HFpEF, hypertension, hyperlipidemia, CAD, atrial fibrillation, LLE DVT, aortic stenosis s/p TAVR, PAD s/p recent L fem to AT bypass using cryovein on Eliquis who presents with acute bleed from AT surgical site.  - Transfusion as needed  - Reversal of INR with FFP  - Urgent operative evaluation for ligation of bleeding vessel, possible amputation planning  - SICU admission post-operatively for additional monitoring

## 2022-05-31 NOTE — H&P ADULT - NSHPLABSRESULTS_GEN_ALL_CORE
LABS:                          7.9    11.66 )-----------( 211      ( 31 May 2022 05:08 )             25.3     05-31    139  |  94<L>  |  32.3<H>  ----------------------------<  131<H>  3.9   |  21.0<L>  |  2.86<H>    Ca    7.9<L>      31 May 2022 05:08    TPro  4.9<L>  /  Alb  2.7<L>  /  TBili  0.7  /  DBili  x   /  AST  105<H>  /  ALT  80<H>  /  AlkPhos  171<H>  05-31    PT/INR - ( 31 May 2022 05:08 )   PT: 20.6 sec;   INR: 1.77 ratio         PTT - ( 31 May 2022 05:08 )  PTT:34.8 sec

## 2022-05-31 NOTE — CONSULT NOTE ADULT - CRITICAL CARE ATTENDING COMMENT
Am events noted.  Reports some discomfort at LLE.    GEN:  Opens eyes, will answer some questions, oriented to person, unsure of am events  CVS:  RRR  PUL:  Clear b/l  ABD:  Soft, non tender, non distended  EXT:  R femoral cordis site clean and dry, circumferential ace wrap to LLE w/skin proximal to graft noted to be cool relative to above the knee, L foot w/dry gangrene, some skin discoloration in mid portion of foot    88 year old gentleman w/hx of ESRD on HD (q Monday and Friday via AVF), GI bleed secondary to AVMs, CAD, HTN, PPM (wireless), HFpEF, HTN, HLD, CAD, atrial fibrillation, LLE DVT, s/p TAVR for AS, PAD, s/p L fem to AT bypass w/cryovein complicated by graft rethrombosis s/p thrombectomy (4/22) and further complicated by non healing graft incision of lower leg, patient on Eliquis for AC who was found down in pool of blood (reportedly 3-4 units per nurse daughter).  Noted to be bleeding from lower leg graft incision.  Emergently resuscitated and taken to OR 5/31/22 and underwent ligtation of distal aspect of the graft.  Hemorrhagic shock.    On arrival patient noted to have SBP to low 100s w/HR in 60s-70s.  Bedside POCUS showed kissing ventricles with mildly reduced cardiac function and variability of IVC.  Gentle fluid bolus and then albumin given pending lab results.  Initial H/H adequate but subsequent H/H dropped to 6.2.  Will give slow transfusion to continue resuscitation.  Reassess w/POCUS throughout resuscitation.  Pain control.  Plan per vascular - awaiting demarcation of LLE and discussion w/family regarding plan going forward.  Adequate sats on RA.  Diet as tolerated.  Bowel regimen.  Nephrology consult - dialysis q M and F.  Will d/c femoral cordis - to facilitate moving patient OOB to chair.    Had long discussion with patient's daughter, Vanessa, regarding goals of care.  She knows her mother would want her father to "go peacefully".  She will discuss with the family.

## 2022-05-31 NOTE — CONSULT NOTE ADULT - SUBJECTIVE AND OBJECTIVE BOX
HPI:    88M history of ESRD on HD, GI bleed secondary to AVMs, CAD, hypertension, PPM, HFpEF, hypertension, hyperlipidemia, CAD, atrial fibrillation, LLE DVT, aortic stenosis s/p TAVR, PAD s/p recent L fem to AT bypass with cryovein c/b acute graft rethrombosis s/p thrombectomy discharged on Lovenox.  Pt previously admitted with lower GI bleed.  Lovenox at that time was discontinued and patient was stabilized and transitioned to oral anticoagulation on Eliquis.  Pt family states that lower portion of graft incision non-healing and was receiving wound care.  At home earlier today, was found down at home with bleeding from the left leg where prior cryovein was anastamosed to AT artery. Family reports likely 3-4 units of blood pooled on the floor, was hypotensive to 80s when EMS arrived and soaked through initial dressing. Was non-responsive on arrival. ED placed a cordis and transfused 2units emergency release with IVF. Given additional 1 of FFP. Patient mentation improved with SBP increasing to 110s. Emergently to OR for operative ligation of bleeding cryovein Fem-AT bypass.  In OR pt was noted with distal aspect of the bypass graft with linear tear , 3mm from the anastomosis and ligation of bleeding cryovein Fem-AT bypass was ligated.  SICU for post op resuscitation and vascular checks      MEDICATIONS  (STANDING):  acetaminophen   IVPB .. 1000 milliGRAM(s) IV Intermittent every 6 hours  influenza  Vaccine (HIGH DOSE) 0.7 milliLiter(s) IntraMuscular once  melatonin 3 milliGRAM(s) Oral at bedtime  multiple electrolytes Injection Type 1 1000 milliLiter(s) (100 mL/Hr) IV Continuous <Continuous>  Nephro-pito 1 Tablet(s) Oral daily  pantoprazole    Tablet 40 milliGRAM(s) Oral every 12 hours  polyethylene glycol 3350 17 Gram(s) Oral every 12 hours  senna 2 Tablet(s) Oral at bedtime  sevelamer carbonate 800 milliGRAM(s) Oral three times a day with meals  tamsulosin 0.4 milliGRAM(s) Oral at bedtime  traZODone 50 milliGRAM(s) Oral at bedtime    MEDICATIONS  (PRN):  HYDROmorphone  Injectable 0.5 milliGRAM(s) IV Push every 3 hours PRN Breakthrough pain  HYDROmorphone  Injectable 0.5 milliGRAM(s) IV Push every 3 hours PRN Moderate Pain (4 - 6)      Drug Dosing Weight  Height (cm): 165 (31 May 2022 09:00)  Weight (kg): 66.2 (31 May 2022 09:00)  BMI (kg/m2): 24.3 (31 May 2022 09:00)  BSA (m2): 1.73 (31 May 2022 09:00)      PAST MEDICAL & SURGICAL HISTORY:  DM (diabetes mellitus)  - resolved      AV block, 1st degree      Arrhythmia      CAD (coronary artery disease)      HTN (hypertension)      VT (ventricular tachycardia)      RICHARDS (dyspnea on exertion)      Anemia      Risk factors for obstructive sleep apnea      ESRD on dialysis  HD on Mondays and Fridays      Aortic stenosis  - s/p valve replacement      GI bleeding  due to ulcerated polyps and colonic AVMs      H/O circumcision  at  age  65      H/O left knee surgery      H/O angioplasty  2013,  no  intervention      A-V fistula  left arm 5/2017      H/O carotid endarterectomy  Right      S/P TAVR (transcatheter aortic valve replacement)      History of loop recorder          ICU Vital Signs Last 24 Hrs  T(C): 36.3 (31 May 2022 16:00), Max: 36.5 (31 May 2022 08:30)  T(F): 97.3 (31 May 2022 16:00), Max: 97.7 (31 May 2022 08:30)  HR: 67 (31 May 2022 16:00) (62 - 76)  BP: 106/43 (31 May 2022 16:00) (85/46 - 124/61)  BP(mean): 63 (31 May 2022 16:00) (53 - 80)  ABP: --  ABP(mean): --  RR: 14 (31 May 2022 16:00) (13 - 24)  SpO2: 100% (31 May 2022 16:00) (93% - 100%)          I&O's Detail    31 May 2022 07:01  -  31 May 2022 16:45  --------------------------------------------------------  IN:    IV PiggyBack: 200 mL    IV PiggyBack: 50 mL    IV PiggyBack: 300 mL    multiple electrolytes Injection Type 1.: 800 mL  Total IN: 1350 mL    OUT:  Total OUT: 0 mL    Total NET: 1350 mL        Physical Exam:    Neurological:  Alert but confused with c/o of pain to LLE.  Non-focal.  Moving all extremities.  No appreciable motor deficits    HEENT: PERRLA, no drainage or redness.     Neck: Neck supple, No JVD    Respiratory:  Trachea midline, equal chest rise.  Breath Sounds equal bilateral    Cardiovascular: Regular rate & rhythm, normal S1, S2    Gastrointestinal: Soft, non-tender, normal bowel sounds    Extremities: LLE with dry gangrene of toes, bluish discoloration of metatarsal region. edematous and cool to touch. Compression dressing wrapped circumfirentially around the leg.        LABS:  CBC Full  -  ( 31 May 2022 09:41 )  WBC Count : 14.69 K/uL  RBC Count : 2.62 M/uL  Hemoglobin : 8.2 g/dL  Hematocrit : 24.5 %  Platelet Count - Automated : 178 K/uL  Mean Cell Volume : 93.5 fl  Mean Cell Hemoglobin : 31.3 pg  Mean Cell Hemoglobin Concentration : 33.5 gm/dL  Auto Neutrophil # : 12.66 K/uL  Auto Lymphocyte # : 0.28 K/uL  Auto Monocyte # : 1.65 K/uL  Auto Eosinophil # : 0.00 K/uL  Auto Basophil # : 0.03 K/uL  Auto Neutrophil % : 86.2 %  Auto Lymphocyte % : 1.9 %  Auto Monocyte % : 11.2 %  Auto Eosinophil % : 0.0 %  Auto Basophil % : 0.2 %    05-31    138  |  96<L>  |  36.5<H>  ----------------------------<  113<H>  3.8   |  27.0  |  3.04<H>    Ca    7.8<L>      31 May 2022 09:41  Phos  4.6     05-31  Mg     1.6     05-31    TPro  5.1<L>  /  Alb  2.9<L>  /  TBili  0.9  /  DBili  x   /  AST  463<H>  /  ALT  326<H>  /  AlkPhos  193<H>  05-31    PT/INR - ( 31 May 2022 09:41 )   PT: 21.7 sec;   INR: 1.86 ratio         PTT - ( 31 May 2022 09:41 )  PTT:34.0 sec         HPI:    89 yo Male with PMH of ESRD on HD, GI bleed secondary to AVMs, CAD, hypertension, PPM, HFpEF, hypertension, hyperlipidemia, CAD, atrial fibrillation, LLE DVT, aortic stenosis s/p TAVR, PAD s/p recent L fem to AT bypass with cryovein c/b acute graft rethrombosis s/p thrombectomy discharged on Lovenox.  Pt previously admitted with lower GI bleed.  Lovenox at that time was discontinued and patient was stabilized and transitioned to oral anticoagulation on Eliquis.  Pt family states that lower portion of graft incision non-healing and was receiving wound care.  At home earlier today, was found down at home with bleeding from the left leg where prior cryovein was anastamosed to AT artery. Family reports likely 3-4 units of blood pooled on the floor, was hypotensive to 80s when EMS arrived and soaked through initial dressing. Was non-responsive on arrival. ED placed a cordis and transfused 2units emergency release with IVF. Given additional 1 of FFP. Patient mentation improved with SBP increasing to 110s. Emergently to OR for operative ligation of bleeding cryovein Fem-AT bypass.  In OR pt was noted with distal aspect of the bypass graft with linear tear , 3mm from the anastomosis and ligation of bleeding cryovein Fem-AT bypass was ligated.  SICU for post op resuscitation and vascular checks      MEDICATIONS  (STANDING):  acetaminophen   IVPB .. 1000 milliGRAM(s) IV Intermittent every 6 hours  influenza  Vaccine (HIGH DOSE) 0.7 milliLiter(s) IntraMuscular once  melatonin 3 milliGRAM(s) Oral at bedtime  multiple electrolytes Injection Type 1 1000 milliLiter(s) (100 mL/Hr) IV Continuous <Continuous>  Nephro-pito 1 Tablet(s) Oral daily  pantoprazole    Tablet 40 milliGRAM(s) Oral every 12 hours  polyethylene glycol 3350 17 Gram(s) Oral every 12 hours  senna 2 Tablet(s) Oral at bedtime  sevelamer carbonate 800 milliGRAM(s) Oral three times a day with meals  tamsulosin 0.4 milliGRAM(s) Oral at bedtime  traZODone 50 milliGRAM(s) Oral at bedtime    MEDICATIONS  (PRN):  HYDROmorphone  Injectable 0.5 milliGRAM(s) IV Push every 3 hours PRN Breakthrough pain  HYDROmorphone  Injectable 0.5 milliGRAM(s) IV Push every 3 hours PRN Moderate Pain (4 - 6)      Drug Dosing Weight  Height (cm): 165 (31 May 2022 09:00)  Weight (kg): 66.2 (31 May 2022 09:00)  BMI (kg/m2): 24.3 (31 May 2022 09:00)  BSA (m2): 1.73 (31 May 2022 09:00)      PAST MEDICAL & SURGICAL HISTORY:  DM (diabetes mellitus)  - resolved      AV block, 1st degree      Arrhythmia      CAD (coronary artery disease)      HTN (hypertension)      VT (ventricular tachycardia)      RICHARDS (dyspnea on exertion)      Anemia      Risk factors for obstructive sleep apnea      ESRD on dialysis  HD on Mondays and Fridays      Aortic stenosis  - s/p valve replacement      GI bleeding  due to ulcerated polyps and colonic AVMs      H/O circumcision  at  age  65      H/O left knee surgery      H/O angioplasty  2013,  no  intervention      A-V fistula  left arm 5/2017      H/O carotid endarterectomy  Right      S/P TAVR (transcatheter aortic valve replacement)      History of loop recorder          ICU Vital Signs Last 24 Hrs  T(C): 36.3 (31 May 2022 16:00), Max: 36.5 (31 May 2022 08:30)  T(F): 97.3 (31 May 2022 16:00), Max: 97.7 (31 May 2022 08:30)  HR: 67 (31 May 2022 16:00) (62 - 76)  BP: 106/43 (31 May 2022 16:00) (85/46 - 124/61)  BP(mean): 63 (31 May 2022 16:00) (53 - 80)  ABP: --  ABP(mean): --  RR: 14 (31 May 2022 16:00) (13 - 24)  SpO2: 100% (31 May 2022 16:00) (93% - 100%)          I&O's Detail    31 May 2022 07:01  -  31 May 2022 16:45  --------------------------------------------------------  IN:    IV PiggyBack: 200 mL    IV PiggyBack: 50 mL    IV PiggyBack: 300 mL    multiple electrolytes Injection Type 1.: 800 mL  Total IN: 1350 mL    OUT:  Total OUT: 0 mL    Total NET: 1350 mL        Physical Exam:    Neurological:  Alert but confused with c/o of pain to LLE.  Non-focal.  Moving all extremities.  No appreciable motor deficits    HEENT: PERRLA, no drainage or redness.     Neck: Neck supple, No JVD    Respiratory:  Trachea midline, equal chest rise.  Breath Sounds equal bilateral    Cardiovascular: Regular rate & rhythm, normal S1, S2    Gastrointestinal: Soft, non-tender, normal bowel sounds    Extremities: LLE with dry gangrene of toes, bluish discoloration of metatarsal region. edematous and cool to touch. Compression dressing wrapped circumfirentially around the leg.        LABS:  CBC Full  -  ( 31 May 2022 09:41 )  WBC Count : 14.69 K/uL  RBC Count : 2.62 M/uL  Hemoglobin : 8.2 g/dL  Hematocrit : 24.5 %  Platelet Count - Automated : 178 K/uL  Mean Cell Volume : 93.5 fl  Mean Cell Hemoglobin : 31.3 pg  Mean Cell Hemoglobin Concentration : 33.5 gm/dL  Auto Neutrophil # : 12.66 K/uL  Auto Lymphocyte # : 0.28 K/uL  Auto Monocyte # : 1.65 K/uL  Auto Eosinophil # : 0.00 K/uL  Auto Basophil # : 0.03 K/uL  Auto Neutrophil % : 86.2 %  Auto Lymphocyte % : 1.9 %  Auto Monocyte % : 11.2 %  Auto Eosinophil % : 0.0 %  Auto Basophil % : 0.2 %    05-31    138  |  96<L>  |  36.5<H>  ----------------------------<  113<H>  3.8   |  27.0  |  3.04<H>    Ca    7.8<L>      31 May 2022 09:41  Phos  4.6     05-31  Mg     1.6     05-31    TPro  5.1<L>  /  Alb  2.9<L>  /  TBili  0.9  /  DBili  x   /  AST  463<H>  /  ALT  326<H>  /  AlkPhos  193<H>  05-31    PT/INR - ( 31 May 2022 09:41 )   PT: 21.7 sec;   INR: 1.86 ratio         PTT - ( 31 May 2022 09:41 )  PTT:34.0 sec

## 2022-06-01 NOTE — PROGRESS NOTE ADULT - SUBJECTIVE AND OBJECTIVE BOX
[id number]    History:  The patient is status post ligation of left distal bypass conduit.   No report of hemorrhage overnight  pain now better controlled     Vital Signs Last 24 Hrs,    T(C): 36.8 (01 Jun 2022 03:26), Max: 36.9 (31 May 2022 23:37)  T(F): 98.2 (01 Jun 2022 03:26), Max: 98.4 (31 May 2022 23:37)  HR: 67 (01 Jun 2022 07:00) (62 - 73)  BP: 142/67 (01 Jun 2022 07:00) (100/60 - 163/59)  BP(mean): 88 (01 Jun 2022 07:00) (53 - 91)  RR: 11 (01 Jun 2022 07:00) (11 - 23)  SpO2: 100% (01 Jun 2022 07:00) (93% - 100%)    I&O's Summary    31 May 2022 07:01  -  01 Jun 2022 07:00  --------------------------------------------------------  IN: 2696 mL / OUT: 0 mL / NET: 2696 mL                        9.6    7.33  )-----------( 162      ( 01 Jun 2022 04:48 )             27.9     06-01    138  |  95<L>  |  44.2<H>  ----------------------------<  128<H>  3.7   |  27.0  |  3.47<H>    Ca    8.4<L>      01 Jun 2022 04:48  Phos  4.3     06-01  Mg     2.2     06-01    TPro  5.9<L>  /  Alb  3.2<L>  /  TBili  0.8  /  DBili  0.4<H>  /  AST  209<H>  /  ALT  119<H>  /  AlkPhos  198<H>  06-01    MEDICATIONS  (STANDING):  influenza  Vaccine (HIGH DOSE) 0.7 milliLiter(s) IntraMuscular once  melatonin 3 milliGRAM(s) Oral at bedtime  multiple electrolytes Injection Type 1 1000 milliLiter(s) (100 mL/Hr) IV Continuous <Continuous>  Nephro-pito 1 Tablet(s) Oral daily  pantoprazole    Tablet 40 milliGRAM(s) Oral every 12 hours  polyethylene glycol 3350 17 Gram(s) Oral every 12 hours  senna 2 Tablet(s) Oral at bedtime  sevelamer carbonate 800 milliGRAM(s) Oral three times a day with meals  tamsulosin 0.4 milliGRAM(s) Oral at bedtime  traZODone 50 milliGRAM(s) Oral at bedtime    MEDICATIONS  (PRN):    HYDROmorphone  Injectable 0.5 milliGRAM(s) IV Push every 3 hours PRN Breakthrough pain  HYDROmorphone  Injectable 0.5 milliGRAM(s) IV Push every 3 hours PRN Moderate Pain (4 - 6)    Physical exam:     Pale , appears comfortable  non labored breathing  non distended abdomen  left foot cooler than right, no pedal signals attainable  preexisting dry gangrenous changes to the forefoot and toes   clean ace wrap in place to the distal tibial region     Primary  Assessment:  • s/p ligation of left distal bypass graft due to urgent need to control hemorraghic shock   • left foot poorly perfused ,    ESRD ( DN ) - HD,    Plan:   • Pain Management,   - Dr Sorenson to see patient today and discuss likely additional intervention with the family

## 2022-06-01 NOTE — PROGRESS NOTE ADULT - ASSESSMENT
Patient was seen and evaluated on dialysis.   Patient is tolerating the procedure well.   T(C): 36.5 (06-01-22 @ 10:10), Max: 36.9 (05-31-22 @ 23:37)  HR: 66 (06-01-22 @ 10:45) (62 - 73)  BP: 131/61 (06-01-22 @ 10:45) (100/60 - 169/69)  Continue dialysis: in AM ?  Dialyzer: Revaclear 300         QB: 400 ml.,        QD: 500ml.,  Goal UF _As Josh., _ over _3_ Hours     AVG Thrombosed Shortle aftre Initiation , HD Terminated,     Duplex US This PM, D/W [facility],     ? TDC ( IJ ) & Resume HD in AM,

## 2022-06-01 NOTE — SWALLOW BEDSIDE ASSESSMENT ADULT - COMMENTS
As per MD note: "87 yo Male with PMH of ESRD on HD, GI bleed secondary to AVMs, CAD, hypertension, PPM, HFpEF, hypertension, hyperlipidemia, CAD, atrial fibrillation, LLE DVT, aortic stenosis s/p TAVR, PAD s/p recent L fem to AT bypass with cryovein c/b acute graft rethrombosis s/p thrombectomy presenting with hemorrhagic shock s/p rupture of cryovein Fem-AT bypass s/p ligation of graft"

## 2022-06-01 NOTE — PROGRESS NOTE ADULT - SUBJECTIVE AND OBJECTIVE BOX
24h Events:    Pt was taken emergently from ER to OR with vascular surgery for bleeding from prior bypass graft site. Intraoperatively, pt's distal aspect of bypass graft found to have a linear tear approximately 3mm from the anastomosis. Graft was ligated and hemostasis achieved. Pt was admitted to SICU postoperatively for q1 hr vascular checks and resuscitation. LLE without signals post operatively, foot cool to touch, dry gangrene to all 5 toes. Vascular surgery aware. Post op, hgb drift from     ICU Vital Signs Last 24 Hrs  T(C): 36.9 (31 May 2022 23:37), Max: 36.9 (31 May 2022 23:37)  T(F): 98.4 (31 May 2022 23:37), Max: 98.4 (31 May 2022 23:37)  HR: 63 (01 Jun 2022 02:00) (62 - 76)  BP: 139/58 (01 Jun 2022 02:00) (85/46 - 139/58)  BP(mean): 83 (01 Jun 2022 02:00) (53 - 91)  ABP: --  ABP(mean): --  RR: 13 (01 Jun 2022 02:00) (11 - 24)  SpO2: 99% (01 Jun 2022 02:00) (93% - 100%)      I&O's Detail    31 May 2022 07:01  -  01 Jun 2022 02:26  --------------------------------------------------------  IN:    IV PiggyBack: 50 mL    IV PiggyBack: 300 mL    IV PiggyBack: 300 mL    multiple electrolytes Injection Type 1.: 1200 mL    PRBCs (Packed Red Blood Cells): 646 mL  Total IN: 2496 mL    OUT:  Total OUT: 0 mL    Total NET: 2496 mL              MEDICATIONS  (STANDING):  acetaminophen   IVPB .. 1000 milliGRAM(s) IV Intermittent every 6 hours  influenza  Vaccine (HIGH DOSE) 0.7 milliLiter(s) IntraMuscular once  melatonin 3 milliGRAM(s) Oral at bedtime  multiple electrolytes Injection Type 1 1000 milliLiter(s) (100 mL/Hr) IV Continuous <Continuous>  Nephro-pito 1 Tablet(s) Oral daily  pantoprazole    Tablet 40 milliGRAM(s) Oral every 12 hours  polyethylene glycol 3350 17 Gram(s) Oral every 12 hours  senna 2 Tablet(s) Oral at bedtime  sevelamer carbonate 800 milliGRAM(s) Oral three times a day with meals  tamsulosin 0.4 milliGRAM(s) Oral at bedtime  traZODone 50 milliGRAM(s) Oral at bedtime    MEDICATIONS  (PRN):  HYDROmorphone  Injectable 0.5 milliGRAM(s) IV Push every 3 hours PRN Breakthrough pain  HYDROmorphone  Injectable 0.5 milliGRAM(s) IV Push every 3 hours PRN Moderate Pain (4 - 6)      Physical Exam:    Neurological:  Alert but confused with c/o of pain to LLE.  Non-focal.  Moving all extremities.  No appreciable motor deficits    HEENT: PERRLA, no drainage or redness.     Neck: Neck supple, No JVD    Respiratory:  Trachea midline, equal chest rise.  Breath Sounds equal bilateral    Cardiovascular: Regular rate & rhythm, normal S1, S2    Gastrointestinal: Soft, non-tender, normal bowel sounds    Extremities: LLE with dry gangrene of toes, bluish discoloration of metatarsal region. Edematous and cool to touch. Compression dressing wrapped circumferentially around the leg.      LABS:  CBC Full  -  ( 31 May 2022 16:51 )  WBC Count : 7.81 K/uL  RBC Count : 1.98 M/uL  Hemoglobin : 6.2 g/dL  Hematocrit : 18.3 %  Platelet Count - Automated : 135 K/uL  Mean Cell Volume : 92.4 fl  Mean Cell Hemoglobin : 31.3 pg  Mean Cell Hemoglobin Concentration : 33.9 gm/dL  Auto Neutrophil # : x  Auto Lymphocyte # : x  Auto Monocyte # : x  Auto Eosinophil # : x  Auto Basophil # : x  Auto Neutrophil % : x  Auto Lymphocyte % : x  Auto Monocyte % : x  Auto Eosinophil % : x  Auto Basophil % : x    05-31    137  |  95<L>  |  39.6<H>  ----------------------------<  134<H>  3.6   |  27.0  |  3.14<H>    Ca    7.5<L>      31 May 2022 16:51  Phos  4.6     05-31  Mg     1.6     05-31    TPro  4.9<L>  /  Alb  2.9<L>  /  TBili  0.5  /  DBili  x   /  AST  304<H>  /  ALT  206<H>  /  AlkPhos  159<H>  05-31    PT/INR - ( 31 May 2022 09:41 )   PT: 21.7 sec;   INR: 1.86 ratio         PTT - ( 31 May 2022 09:41 )  PTT:34.0 sec    RECENT CULTURES:      LIVER FUNCTIONS - ( 31 May 2022 16:51 )  Alb: 2.9 g/dL / Pro: 4.9 g/dL / ALK PHOS: 159 U/L / ALT: 206 U/L / AST: 304 U/L / GGT: x           CARDIAC MARKERS ( 31 May 2022 16:51 )  x     / x     / 207 U/L / x     / 7.4 ng/mL  CARDIAC MARKERS ( 31 May 2022 09:41 )  x     / x     / 109 U/L / x     / x        ASSESSMENT/PLAN:    87 yo Male with PMH of ESRD on HD, GI bleed secondary to AVMs, CAD, hypertension, PPM, HFpEF, hypertension, hyperlipidemia, CAD, atrial fibrillation, LLE DVT, aortic stenosis s/p TAVR, PAD s/p recent L fem to AT bypass with cryovein c/b acute graft rethrombosis s/p thrombectomy presenting with hemorrhagic shock s/p rupture of cryovein Fem-AT bypass s/p ligation of graft    Neuro:   - Multimodal pain control   - delirium precautions  - Optimize sleep / wake cycle    CV: Hemorrhagic shock  - Received 2 unit PRBC and 1 FFP prior to OR 5/31  - s/p 2u PRBC overnight 5/31 post op  - TTE preformed, EF > 70%, likely hypovolemia due to acute blood loss, 2u PRBC will aid in volume expansion + continue plyte @ 100 ml/hr  - Continue hemodynamic monitoring  - Maintain MAP > 65    Pulm:   - Incentive spirometry  - Pulmonary toilet  - OOB to chair    GI/Nutrition:   - DASH / renal diet  - continue bowel reg    /Renal:  ESRD  - Nephrology following, HD tomorrow  - monitor kidney fxn    ID:  - Monitor fever curve  - no leukocytosis    Endo:  - monitor blood glucose    Skin:   - repositioning for DTI prevention while in bed    Heme/DVT Prophylaxis:  - SCDs  - holding chemical ppx due to acute blood loss anemia    Extremity:    - S/p ligation of cryovein Fem-AT bypass  - Monitor for limb ischemia  - F/up vascular plan for possible amputation  - Pt with dry gangrene to toes on the LLE foot    Dispo:  - Continue care per SICU

## 2022-06-01 NOTE — SWALLOW BEDSIDE ASSESSMENT ADULT - ORAL PHASE
Within functional limits impacted by loose upper dentures with periods of reduced attention to bolus/Decreased anterior-posterior movement of the bolus/Delayed oral transit time

## 2022-06-01 NOTE — PROGRESS NOTE ADULT - SUBJECTIVE AND OBJECTIVE BOX
CHRISTIANO ELIZABETH    [id number]    History:  The patient is status post ligation of left distal bypass conduit.   No report of hemorrhage overnight  pain now better controlled     Vital Signs Last 24 Hrs  T(C): 36.8 (01 Jun 2022 03:26), Max: 36.9 (31 May 2022 23:37)  T(F): 98.2 (01 Jun 2022 03:26), Max: 98.4 (31 May 2022 23:37)  HR: 67 (01 Jun 2022 07:00) (62 - 73)  BP: 142/67 (01 Jun 2022 07:00) (100/60 - 163/59)  BP(mean): 88 (01 Jun 2022 07:00) (53 - 91)  RR: 11 (01 Jun 2022 07:00) (11 - 23)  SpO2: 100% (01 Jun 2022 07:00) (93% - 100%)  I&O's Summary    31 May 2022 07:01  -  01 Jun 2022 07:00  --------------------------------------------------------  IN: 2696 mL / OUT: 0 mL / NET: 2696 mL                              9.6    7.33  )-----------( 162      ( 01 Jun 2022 04:48 )             27.9     06-01    138  |  95<L>  |  44.2<H>  ----------------------------<  128<H>  3.7   |  27.0  |  3.47<H>    Ca    8.4<L>      01 Jun 2022 04:48  Phos  4.3     06-01  Mg     2.2     06-01    TPro  5.9<L>  /  Alb  3.2<L>  /  TBili  0.8  /  DBili  0.4<H>  /  AST  209<H>  /  ALT  119<H>  /  AlkPhos  198<H>  06-01      MEDICATIONS  (STANDING):  influenza  Vaccine (HIGH DOSE) 0.7 milliLiter(s) IntraMuscular once  melatonin 3 milliGRAM(s) Oral at bedtime  multiple electrolytes Injection Type 1 1000 milliLiter(s) (100 mL/Hr) IV Continuous <Continuous>  Nephro-pito 1 Tablet(s) Oral daily  pantoprazole    Tablet 40 milliGRAM(s) Oral every 12 hours  polyethylene glycol 3350 17 Gram(s) Oral every 12 hours  senna 2 Tablet(s) Oral at bedtime  sevelamer carbonate 800 milliGRAM(s) Oral three times a day with meals  tamsulosin 0.4 milliGRAM(s) Oral at bedtime  traZODone 50 milliGRAM(s) Oral at bedtime    MEDICATIONS  (PRN):  HYDROmorphone  Injectable 0.5 milliGRAM(s) IV Push every 3 hours PRN Breakthrough pain  HYDROmorphone  Injectable 0.5 milliGRAM(s) IV Push every 3 hours PRN Moderate Pain (4 - 6)      Physical exam:     asleep, appears comfortable  non labored breathing  non distended abdomen  left foot cooler than right, no pedal signals atainable  preexisting dry gangrenous changes to the forefoot and toes   clean ace wrap in place to the distal tibial region       Primary  Assessment:  • s/p ligation of left distal bypass graft due to urgent need to control hemorraghic shock   • left foot poorly perfused     Plan:   • continue to provide adequate pain control  - Dr Sorenson to see patient today and discuss likely additional intervention with the family

## 2022-06-01 NOTE — CHART NOTE - NSCHARTNOTEFT_GEN_A_CORE
Patient  could not complete his hemodialysis , his A-V fistula malfunction  He will be  be taken for endovascular procedure - thrombectomy  tomorrow  with Dr. Barbara Sorenson

## 2022-06-02 NOTE — DIETITIAN INITIAL EVALUATION ADULT - PERTINENT LABORATORY DATA
06-02    135  |  94<L>  |  58.9<H>  ----------------------------<  111<H>  3.8   |  25.0  |  4.26<H>    Ca    8.7      02 Jun 2022 03:50  Phos  4.5     06-02  Mg     2.3     06-02    TPro  5.9<L>  /  Alb  3.2<L>  /  TBili  0.8  /  DBili  0.4<H>  /  AST  209<H>  /  ALT  119<H>  /  AlkPhos  198<H>  06-01  A1C with Estimated Average Glucose Result: 5.0 % (07-14-21 @ 11:36)

## 2022-06-02 NOTE — PROGRESS NOTE ADULT - SUBJECTIVE AND OBJECTIVE BOX
HPI/OVERNIGHT EVENTS: Patient seen and examined at bedside this AM. No overnight events. No complaints. Denies fever, chills, nausea, vomiting, chest pain, SOB, dizziness, abd pain or any other concerning symptoms.    Vital Signs Last 24 Hrs  T(C): 36.9 (02 Jun 2022 07:17), Max: 37.9 (01 Jun 2022 20:00)  T(F): 98.5 (02 Jun 2022 07:17), Max: 100.2 (01 Jun 2022 20:00)  HR: 61 (02 Jun 2022 06:00) (56 - 75)  BP: 135/56 (02 Jun 2022 06:00) (113/64 - 169/69)  BP(mean): 80 (02 Jun 2022 06:00) (71 - 118)  RR: 15 (02 Jun 2022 06:00) (12 - 35)  SpO2: 96% (02 Jun 2022 06:00) (89% - 100%)    I&O's Detail    01 Jun 2022 07:01  -  02 Jun 2022 07:00  --------------------------------------------------------  IN:    IV PiggyBack: 100 mL    Oral Fluid: 525 mL  Total IN: 625 mL    OUT:    Voided (mL): 425 mL  Total OUT: 425 mL    Total NET: 200 mL    Physical exam:   asleep, appears comfortable  non labored breathing  non distended abdomen  left foot cooler than right, no pedal signals obtainable  preexisting dry gangrenous changes to the forefoot and toes   clean ace wrap in place to the distal tibial region     LABS:                        8.0    7.41  )-----------( 130      ( 02 Jun 2022 03:50 )             24.3     06-02    135  |  94<L>  |  58.9<H>  ----------------------------<  111<H>  3.8   |  25.0  |  4.26<H>    Ca    8.7      02 Jun 2022 03:50  Phos  4.5     06-02  Mg     2.3     06-02    TPro  5.9<L>  /  Alb  3.2<L>  /  TBili  0.8  /  DBili  0.4<H>  /  AST  209<H>  /  ALT  119<H>  /  AlkPhos  198<H>  06-01    PT/INR - ( 02 Jun 2022 03:50 )   PT: 15.5 sec;   INR: 1.33 ratio         PTT - ( 02 Jun 2022 03:50 )  PTT:27.1 sec      MEDICATIONS  (STANDING):  chlorhexidine 2% Cloths 1 Application(s) Topical <User Schedule>  heparin   Injectable 5000 Unit(s) SubCutaneous every 8 hours  influenza  Vaccine (HIGH DOSE) 0.7 milliLiter(s) IntraMuscular once  melatonin 3 milliGRAM(s) Oral at bedtime  Nephro-pito 1 Tablet(s) Oral daily  pantoprazole    Tablet 40 milliGRAM(s) Oral every 12 hours  polyethylene glycol 3350 17 Gram(s) Oral every 12 hours  senna 2 Tablet(s) Oral at bedtime  sevelamer carbonate 800 milliGRAM(s) Oral three times a day with meals  tamsulosin 0.4 milliGRAM(s) Oral at bedtime  traZODone 50 milliGRAM(s) Oral at bedtime    MEDICATIONS  (PRN):  HYDROmorphone  Injectable 0.5 milliGRAM(s) IV Push every 3 hours PRN Moderate Pain (4 - 6)  HYDROmorphone  Injectable 0.5 milliGRAM(s) IV Push every 3 hours PRN Breakthrough pain

## 2022-06-02 NOTE — DIETITIAN INITIAL EVALUATION ADULT - ETIOLOGY
Related to inability to meet sufficient protein energy needs in setting of advanced age w/ ESRD on HD, admitted w/ hemorrhagic shock

## 2022-06-02 NOTE — CHART NOTE - NSCHARTNOTEFT_GEN_A_CORE
SICU TRANSFER NOTE  -----------------------------  ICU Admission Date: 5/31/22  Transfer Date: 06-02-22 @ 13:07    Admission Diagnosis:   1. Hemorrhagic shock  2. Acute blood loss anemia  3. ESRD    Active Problems/injuries:   1. LLE Ischemic limb  2. ESRD    Procedures:   5/31: Distal aspect of bypass graft with linear tear, 3mm from anastomosis.  Ligation of bleeding cryovein Fem-At bypass ligation.    Consultants:  Nephrology.    Medications  acetaminophen   IVPB .. 1000 milliGRAM(s) IV Intermittent once  chlorhexidine 2% Cloths 1 Application(s) Topical <User Schedule>  heparin   Injectable 5000 Unit(s) SubCutaneous every 8 hours  HYDROmorphone  Injectable 0.5 milliGRAM(s) IV Push every 3 hours PRN  HYDROmorphone  Injectable 0.5 milliGRAM(s) IV Push every 3 hours PRN  influenza  Vaccine (HIGH DOSE) 0.7 milliLiter(s) IntraMuscular once  melatonin 3 milliGRAM(s) Oral at bedtime  Nephro-pito 1 Tablet(s) Oral daily  pantoprazole    Tablet 40 milliGRAM(s) Oral every 12 hours  polyethylene glycol 3350 17 Gram(s) Oral every 12 hours  senna 2 Tablet(s) Oral at bedtime  sevelamer carbonate 800 milliGRAM(s) Oral three times a day with meals  tamsulosin 0.4 milliGRAM(s) Oral at bedtime  traZODone 50 milliGRAM(s) Oral at bedtime      [x] I attest I have reviewed and reconciled all medications prior to transfer    IV Fluids  sodium chloride 0.9% Bolus:   1000 milliLiter(s), IV Bolus, once, infuse over 120 Minute(s), Stop After 1 Doses  sodium chloride 0.9% Bolus:   1000 milliLiter(s), IV Bolus, once, infuse over 60 Minute(s), Stop After 1 Doses    I have discussed this case with Vascular team upon transfer and all questions regarding ICU course were answered.  The following items are to be followed up:  1. Angioplasty, declot of LUE AVG today  2. AKA plan  3. HD  4. FU Palliative care consult.

## 2022-06-02 NOTE — PROCEDURE NOTE - PROCEDURE FINDINGS AND DETAILS
consulted for permcath    patient unable to tolerate permcath (attempted).  Placed right temporary dialysis catheter for urgent dialysis after discussion with patient's daughter.

## 2022-06-02 NOTE — DIETITIAN NUTRITION RISK NOTIFICATION - TREATMENT: THE FOLLOWING DIET HAS BEEN RECOMMENDED
Diet, NPO after Midnight:      NPO Start Date: 01-Jun-2022,   NPO Start Time: 23:59 (06-01-22 @ 16:54) [Active]  Diet, DASH/TLC:   Sodium & Cholesterol Restricted  Soft and Bite Sized (SOFTBTSZ)  For patients receiving Renal Replacement - No Protein Restr, No Conc K, No Conc Phos, Low  Sodium (RENAL)  Supplement Feeding Modality:  Oral  Nepro Cans or Servings Per Day:  1       Frequency:  Three Times a day (06-01-22 @ 13:46) [Active]

## 2022-06-02 NOTE — CONSULT NOTE ADULT - ASSESSMENT
88M with  ESRD on HD, anemia, CHF with preserved EF, HTN, CAD, AFIB, PVD s/p recent bypass, LLE DVT, recently admitted with gastrointestinal bleeding, here with hemorrhagic shock related to graft bleeding s/p ligation of bleeding CryoVein fem-AT bypass, with AV graft thrombosis.     #1 Hemorrhagic shock, Fem-AT bypass site bleeding  - s/p ligation 5/31  - shock resolved  - s/p PRBCs and resuscitation    #2 AV graft thrombosis  - operative plans with vascular today, possible new HD access to be placed    #3 LLE pain  - continue PRN dilaudid  - can do ATC Tylenol if can take PO post procedure   - can start Lyrica for neuropathic component of pain     #4 ESRD  - HD to be reinitiated once new access established     #5 DVT, Afib  - not a candidate for AC given multiple bleeding episodes     #6 Encounter for palliative care   - see goals of care  88M with  ESRD on HD, anemia, CHF with preserved EF, HTN, CAD, AFIB, PVD s/p recent bypass, LLE DVT, recently admitted with gastrointestinal bleeding, here with hemorrhagic shock related to graft bleeding s/p ligation of bleeding CryoVein fem-AT bypass, with AV graft thrombosis.     #1 Hemorrhagic shock, Fem-AT bypass site bleeding  - s/p ligation 5/31  - shock resolved  - s/p PRBCs and resuscitation    #2 AV graft thrombosis  - operative plans with vascular today, possible new HD access to be placed    #3 LLE pain  - continue PRN dilaudid  - can do ATC Tylenol if can take PO post procedure   - per daughter he did not respond well to Neurontin or lyrica in the past as he got more confused.     #4 ESRD  - HD to be reinitiated once new access established     #5 DVT, Afib  - not a candidate for AC given multiple bleeding episodes     #6 Encounter for palliative care   - see goals of care

## 2022-06-02 NOTE — DIETITIAN INITIAL EVALUATION ADULT - NS FNS DIET ORDER
Diet, NPO after Midnight:      NPO Start Date: 01-Jun-2022,   NPO Start Time: 23:59 (06-01-22 @ 16:54)  Diet, DASH/TLC:   Sodium & Cholesterol Restricted  Soft and Bite Sized (SOFTBTSZ)  For patients receiving Renal Replacement - No Protein Restr, No Conc K, No Conc Phos, Low  Sodium (RENAL)  Supplement Feeding Modality:  Oral  Nepro Cans or Servings Per Day:  1       Frequency:  Three Times a day (06-01-22 @ 13:46)

## 2022-06-02 NOTE — PROGRESS NOTE ADULT - SUBJECTIVE AND OBJECTIVE BOX
History:  The patient is status post ligation of the left distal bypass conduit.   No report of hemorrhage overnight  pain now better controlled     Vital Signs Last 24 Hrs,    T(C): 36.9 (2022 07:17), Max: 37.9 (2022 20:00)  T(F): 98.5 (2022 07:17), Max: 100.2 (2022 20:00)  HR: 68 (2022 08:00) (56 - 75)  BP: 145/79 (2022 08:00) (113/64 - 169/69)  BP(mean): 99 (2022 08:00) (71 - 115)  RR: 18 (2022 08:00) (12 - 35)  SpO2: 92% (2022 08:00) (89% - 100%)    I&O's Summary    31 May 2022 07:01  -  2022 07:00  --------------------------------------------------------  IN: 2696 mL / OUT: 0 mL / NET: 2696 mL.    135    |  94<L>  |  58.9<H>  ----------------------------<  111<H>  Ca:8.7   (2022 03:50)  3.8     |  25.0   |  4.26<H>    TPro  5.9<L>  /  Alb  3.2<L>  /  TBili  0.8    /  DBili  0.4<H>  /  AST  209<H>  /  ALT  119<H>  /  AlkPhos  198<H>  2022 04:48                        8.0<L>  7.41  )-----------( 130<L>    ( 2022 03:50 )             24.3<L>    Phos:4.5 mg/dL M.3 mg/dL PTH:-- Uric acid:-- Serum Osm:-- ( @ 03:50)                        9.6    7.33  )-----------( 162      ( 2022 04:48 )             27.9     06    138  |  95<L>  |  44.2<H>  ----------------------------<  128<H>  3.7   |  27.0  |  3.47<H>    Ca    8.4<L>      2022 04:48  Phos  4.3       Mg     2.2         TPro  5.9<L>  /  Alb  3.2<L>  /  TBili  0.8  /  DBili  0.4<H>  /  AST  209<H>  /  ALT  119<H>  /  AlkPhos  198<H>      MEDICATIONS  (STANDING):  influenza  Vaccine (HIGH DOSE) 0.7 milliLiter(s) IntraMuscular once  melatonin 3 milliGRAM(s) Oral at bedtime  multiple electrolytes Injection Type 1 1000 milliLiter(s) (100 mL/Hr) IV Continuous <Continuous>  Nephro-pito 1 Tablet(s) Oral daily  pantoprazole    Tablet 40 milliGRAM(s) Oral every 12 hours  polyethylene glycol 3350 17 Gram(s) Oral every 12 hours  senna 2 Tablet(s) Oral at bedtime  sevelamer carbonate 800 milliGRAM(s) Oral three times a day with meals  tamsulosin 0.4 milliGRAM(s) Oral at bedtime  traZODone 50 milliGRAM(s) Oral at bedtime    MEDICATIONS  (PRN):    HYDROmorphone  Injectable 0.5 milliGRAM(s) IV Push every 3 hours PRN Breakthrough pain  HYDROmorphone  Injectable 0.5 milliGRAM(s) IV Push every 3 hours PRN Moderate Pain (4 - 6)    Physical exam:     Pale , appears comfortable  non labored breathing  non distended abdomen  left foot cooler than right, no pedal signals attainable  preexisting dry gangrenous changes to the forefoot and toes   clean ace wrap in place to the distal tibial region     US Duplex Hemodialysis Access (22 @ 13:52)   ACC: [id number] EXAM:  US DPLX HEMODIALYSIS ACCESS                          PROCEDURE DATE:  2022      INTERPRETATION:  History:   Thrombosed left AV fistula    Technique: Grayscale, color Doppler ultrasound, spectral analysis of the   leftarm fistula    Comparison: None    Findings/  Impression:    There is a AV fistula and the left arm, which appears to be a   radiocephalic fistula. The radial artery is patent with normal waveforms.   The fistula itself is occluded    Xray Chest 1 View-PORTABLE IMMEDIATE (Xray Chest 1 View-PORTABLE IMMEDIATE .) (22 @ 06:07)     ACC: [id number] EXAM:  XR CHEST PORTABLE IMMED 1V                          PROCEDURE DATE:  2022      INTERPRETATION:  Clinical history: 88-year-old male, hypotension.    Single view of the chest is compared to 2022 and demonstrates mild   cardiomegaly and mild, central pulmonary venous congestion.    Bilateral pleural effusions/adjacent atelectasis, right greater than   left, improved bilaterally.    Post TAVR, cardiac loop recorder and Micra pacemaker noted.    IMPRESSION:  Mild pulmonary venous congestion, markedly improved.    Bilateral pleural effusions/adjacent atelectasis, right greater than   left, improved bilaterally    ESRD on HD  PAD s/p recent L fem to AT bypass using cryovein on Eliquis now with hemorrhagic shock  s/p ligation of artery  AMY with dialysis  ABO RH Interpretation: A POS (22 @ 04:48)   Historical Values  ABO RH Interpretation: A POS (22 @ 04:48)    Primary  Assessment:  • s/p ligation of left distal bypass graft due to urgent need to control hemorraghic shock   • left foot poorly perfused ,    ESRD ( DN ) - HD,    Plan:   • Pain Management,   - Dr Sorenson to see patient today and discuss likely additional intervention with the family      AVG Thrombosed ,    Duplex US Reviewed,      ? TDC ( IJ ) & ? Thrombectomy,

## 2022-06-02 NOTE — CONSULT NOTE ADULT - CONVERSATION DETAILS
COnversation held with Conversation held with patients daughter at bedside at length regarding overall condition, prognosis, and plan of care. Daughter realizes how his illnesses are progressing but given how awake and responsive he is a lot of the time and how hard they have worked to achieving him quality of life with optimal care at home, she wants to continue to try to get him quality of life. She discusses difficulty in this last time and she and her mother thinking they were losing him but how thankful she is that he did not die. She is holding on to every bit of meaningful interaction with him and her children and all the grand children are very close to him as well and get to spend time with him. I addressed advanced directives, she knows they would not want CPR or mechanical ventilation but does not want to put this officially in place until after all his surgeries are completed. I expressed to her that after he gets new dialysis access, I strongly recommend putting that in place while awaiting possible amputation because we never know if he may decompensate before that happens. She is hopeful that amputation will help with his pain and symptoms but I expressed to her that a lot of people post operatively still have significant pain and phantom limb pain. I also addressed that post amputation he is most likely going to need a rehab setting but she is stating they would not want that. A lot of emotional support provided. She reminisced a lot about how good of a father he is and how she is thankful for everyday that she can help take care of him.

## 2022-06-02 NOTE — DIETITIAN INITIAL EVALUATION ADULT - PERTINENT MEDS FT
MEDICATIONS  (STANDING):  acetaminophen   IVPB .. 1000 milliGRAM(s) IV Intermittent once  chlorhexidine 2% Cloths 1 Application(s) Topical <User Schedule>  heparin   Injectable 5000 Unit(s) SubCutaneous every 8 hours  influenza  Vaccine (HIGH DOSE) 0.7 milliLiter(s) IntraMuscular once  melatonin 3 milliGRAM(s) Oral at bedtime  Nephro-pito 1 Tablet(s) Oral daily  pantoprazole    Tablet 40 milliGRAM(s) Oral every 12 hours  polyethylene glycol 3350 17 Gram(s) Oral every 12 hours  senna 2 Tablet(s) Oral at bedtime  sevelamer carbonate 800 milliGRAM(s) Oral three times a day with meals  tamsulosin 0.4 milliGRAM(s) Oral at bedtime  traZODone 50 milliGRAM(s) Oral at bedtime    MEDICATIONS  (PRN):  HYDROmorphone  Injectable 0.5 milliGRAM(s) IV Push every 3 hours PRN Moderate Pain (4 - 6)  HYDROmorphone  Injectable 0.5 milliGRAM(s) IV Push every 3 hours PRN Breakthrough pain

## 2022-06-02 NOTE — CONSULT NOTE ADULT - TIME BILLING
chart review, lab review, imaging review, notes review. Discussion with  and coordination of care with all relevant providers and consultants caring for patient. Discussion with family, providers caring for patient including Bobby Hernandez

## 2022-06-02 NOTE — PROGRESS NOTE ADULT - ASSESSMENT
88y Male with hemorrhagic shock related to LE graft blow out, LLE ischemia, ESRD on HD, AV graft thrombosis    Neuro: Multimodal pain control with judicious narcotics for ischemic LLE, continue to promote sleep hygiene and delirium precautions.  Pulm: OOB  Card: Euvolemic currently, continue torsamide and HD when able to avoid hypervolemia.  GI: npo for procedure today otherwise full enteral nutrition, renal diet  Renal: electrolytes ok, angio and thrombectomy today for AV graft thrombectomy  ID: no obvious source of infection at this time, LLE dry gangrene  PPX: dvt chemoprophylaxis with SQH  Dispo: Hemorrhagic shock resolved, no ongoing critical care needs, downgrade to vascular surgery.

## 2022-06-02 NOTE — PROGRESS NOTE ADULT - ASSESSMENT
88yo M POD 2 from ligation of LLE bypass conduit for graft blowout at distal anastomosis. H/H has remained stable. Patient did not tolerated HD yesterday secondary to a thrombosed fistula, likely secondary to shock state and low flow at admission.     Plan:  -Fistulogram and possible thrombectomy today of AVF  -f/u HD tolerance  -Planning for LLE AKA on Monday 6/6/22  -Continue to monitor LE   -Trend H/H, transfuse PRN  -Rest of care per SICU team  -Vascular surgery following

## 2022-06-02 NOTE — PROGRESS NOTE ADULT - SUBJECTIVE AND OBJECTIVE BOX
HISTORY  88y Male with hemorrhagic shock related to LE graft blow out, LLE ischemia, ESRD on HD, AV graft thrombosis    24 HOUR EVENTS: Unable to complete HD yesterday due to AV graft thrombosis, increased rest pain over night LLE.    SUBJECTIVE/ROS:  [ ] A ten-point review of systems was otherwise negative except as noted.  [ ] Due to altered mental status/intubation, subjective information were not able to be obtained from the patient. History was obtained, to the extent possible, from review of the chart and collateral sources of information.      NEURO  RASS: 0    GCS: 4,4,6    CAM ICU: +  Exam: Confused at times, LLE with hyperesthesia, no motor LLE, moves all other extremities.   Meds: acetaminophen   IVPB .. 1000 milliGRAM(s) IV Intermittent once  HYDROmorphone  Injectable 0.5 milliGRAM(s) IV Push every 3 hours PRN Moderate Pain (4 - 6)  HYDROmorphone  Injectable 0.5 milliGRAM(s) IV Push every 3 hours PRN Breakthrough pain  melatonin 3 milliGRAM(s) Oral at bedtime  traZODone 50 milliGRAM(s) Oral at bedtime    [x] Adequacy of sedation and pain control has been assessed and adjusted      RESPIRATORY  RR: 11 (06-02-22 @ 11:00) (11 - 35)  SpO2: 100% (06-02-22 @ 11:00) (89% - 100%)  Wt(kg): --  Exam: unlabored, clear to auscultation bilaterally  Mechanical Ventilation:   ABG - ( 31 May 2022 12:48 )  pH: x     /  pCO2: x     /  pO2: x     / HCO3: x     / Base Excess: x     /  SaO2: x       Lactate: 1.7              [ ] Extubation Readiness Assessed  Meds:       CARDIOVASCULAR  HR: 66 (06-02-22 @ 11:00) (56 - 75)  BP: 112/65 (06-02-22 @ 11:00) (112/65 - 165/61)  BP(mean): 79 (06-02-22 @ 11:00) (71 - 115)  ABP: --  ABP(mean): --  Wt(kg): --  CVP(cm H2O): --      Exam: s1s2  Cardiac Rhythm: paced  Perfusion     [ x ]Adequate   [ ]Inadequate  Mentation   [ x ]Normal       [ ]Reduced  Extremities  [ x ]Warm         [ ]Cool  Volume Status [ ]Hypervolemic [ x ]Euvolemic [ ]Hypovolemic  Meds: tamsulosin 0.4 milliGRAM(s) Oral at bedtime        GI/NUTRITION  Exam: soft nt nd  Diet: NPO for procedure  Meds: pantoprazole    Tablet 40 milliGRAM(s) Oral every 12 hours  polyethylene glycol 3350 17 Gram(s) Oral every 12 hours  senna 2 Tablet(s) Oral at bedtime      GENITOURINARY  I&O's Detail    06-01 @ 07:01  -  06-02 @ 07:00  --------------------------------------------------------  IN:    IV PiggyBack: 100 mL    Oral Fluid: 525 mL  Total IN: 625 mL    OUT:    Voided (mL): 425 mL  Total OUT: 425 mL    Total NET: 200 mL          06-02    135  |  94<L>  |  58.9<H>  ----------------------------<  111<H>  3.8   |  25.0  |  4.26<H>    Ca    8.7      02 Jun 2022 03:50  Phos  4.5     06-02  Mg     2.3     06-02    TPro  5.9<L>  /  Alb  3.2<L>  /  TBili  0.8  /  DBili  0.4<H>  /  AST  209<H>  /  ALT  119<H>  /  AlkPhos  198<H>  06-01    [ ] Camarena catheter, indication: N/A  Meds: Nephro-pito 1 Tablet(s) Oral daily        HEMATOLOGIC  Meds: heparin   Injectable 5000 Unit(s) SubCutaneous every 8 hours    [x] VTE Prophylaxis                        8.0    7.41  )-----------( 130      ( 02 Jun 2022 03:50 )             24.3     PT/INR - ( 02 Jun 2022 03:50 )   PT: 15.5 sec;   INR: 1.33 ratio         PTT - ( 02 Jun 2022 03:50 )  PTT:27.1 sec  Transfusion     [ ] PRBC   [ ] Platelets   [ ] FFP   [ ] Cryoprecipitate      INFECTIOUS DISEASES  T(C): 37.2 (06-02-22 @ 11:10), Max: 37.9 (06-01-22 @ 20:00)  Wt(kg): --  WBC Count: 7.41 K/uL (06-02 @ 03:50)  WBC Count: 7.91 K/uL (06-01 @ 21:48)    Recent Cultures:    Meds: influenza  Vaccine (HIGH DOSE) 0.7 milliLiter(s) IntraMuscular once        ENDOCRINE  Capillary Blood Glucose    Meds:       ACCESS DEVICES:  [ x ] Peripheral IV  [ ] Central Venous Line	[ ] R	[ ] L	[ ] IJ	[ ] Fem	[ ] SC	Placed:   [ ] Arterial Line		[ ] R	[ ] L	[ ] Fem	[ ] Rad	[ ] Ax	Placed:   [ ] PICC:					[ ] Mediport  [ ] Urinary Catheter, Date Placed:   [ ] Necessity of urinary, arterial, and venous catheters discussed    OTHER MEDICATIONS:  chlorhexidine 2% Cloths 1 Application(s) Topical <User Schedule>  sevelamer carbonate 800 milliGRAM(s) Oral three times a day with meals      CODE STATUS:     IMAGING:    ____ minutes of critical care time spent providing medical care for patient's acute illness/conditions that impairs at least one vital organ system and/or poses a high risk of imminent or life threatening deterioration in the patient's condition. It includes time spent evaluating and treating the patient's acute illness as well as time spent reviewing labs, radiology, discussing goals of care with patient and/or patient's family, and discussing the case with a multidisciplinary team in an effort to prevent further life threatening deterioration or end organ damage. This time is independent of any procedures performed.

## 2022-06-02 NOTE — PROGRESS NOTE ADULT - ASSESSMENT
ESRD - HD    Acute Blood Loss ( From The Recent Vascular bypass Graft - Foot )     Ischemic  Foot    P : HD This PM, PRBC 1 Unit Transfusion,     Pain Management,     AKA on Monday,     D/W Star Herring PA ( [facility] )     D/W daughter the Care plans & RN,

## 2022-06-02 NOTE — DIETITIAN INITIAL EVALUATION ADULT - ORAL INTAKE PTA/DIET HISTORY
Pt is a poor historian; unable to provide nutrition history at time of visit. Pt is currently NPO for OR later today. Pt had swallow evaluation on 6/1; diet advanced to soft and bite sized with thin liquids. NFPE conducted.

## 2022-06-02 NOTE — PROGRESS NOTE ADULT - NS ATTEND AMEND GEN_ALL_CORE FT
I have seen and examined the patient during SICU multidisciplinary rounds from 6584-8184 hrs.   Events noted.    Neuro: Awake, non focal  CV: HD normal,   Pulm: SaO2 adequate  GI: Soft, tolerating diet  : oliguric (baseline) electrolytes ok, unable to finish HD today, no evidence of volume overload  ID: no isues, dry toe gangrene left lower extremity  Heme: H/H stable, on dvt chemoprophylaxes  Endo: Glycemia at target    Plan:  Resolved hemorrhagic shock, H/H stable.   AVF malfunction to be evaluated today  needs GOC addressed  HD per renal  Awaiting demarcation, elevated risk for amputation.
I have seen and examined the patient during SICU multidisciplinary rounds from 3064-1594 hrs.   Events noted.    Neuro: Somnolent, when awaken A&o no focal  CV: HD normal  Pulm: SAAO2 adequate  GI: Soft, tolerating diet  : Oligurics, AVF thrombosed, K and Mg phos and BUN acceptable  ID: Dry gangrene jesu  Heme: H/H stable, on dvt chemoprophylaxes   Endo: glycemia at target    Plan:  resolved hemorraghic shock, thrombosed AVF  plan for amputation in te damion future.  He is booked for PermCath and HD tonight, and thrombectomy I the near futee.  NO acute ICU needs at this time  Agree with transfer to floor  Palliative care consultation for ongoing GOC conversations.

## 2022-06-02 NOTE — DIETITIAN INITIAL EVALUATION ADULT - OTHER INFO
Pt is a 87 yo Male with PMH of ESRD on HD, GI bleed secondary to AVMs, CAD, hypertension, PPM, HFpEF, hypertension, hyperlipidemia, CAD, atrial fibrillation, LLE DVT, aortic stenosis s/p TAVR, PAD s/p recent L fem to AT bypass with cryovein c/b acute graft rethrombosis s/p thrombectomy discharged on Lovenox.  Pt previously admitted with lower GI bleed.  Lovenox at that time was discontinued and patient was stabilized and transitioned to oral anticoagulation on Eliquis.  Pt family states that lower portion of graft incision non-healing and was receiving wound care.  At home earlier today, was found down at home with bleeding from the left leg where prior cryovein was anastamosed to AT artery. Family reports likely 3-4 units of blood pooled on the floor, was hypotensive to 80s when EMS arrived and soaked through initial dressing. Was non-responsive on arrival. ED placed a cordis and transfused 2units emergency release with IVF. Given additional 1 of FFP. Patient mentation improved with SBP increasing to 110s. Emergently to OR for operative ligation of bleeding cryovein Fem-AT bypass.  In OR pt was noted with distal aspect of the bypass graft with linear tear , 3mm from the anastomosis and ligation of bleeding cryovein Fem-AT bypass was ligated.  SICU for post op resuscitation and vascular checks.   Patient did not tolerated HD yesterday secondary to a thrombosed fistula, likely secondary to shock state and low flow at admission. Fistulogram and possible thrombectomy today of AVF.   Planning for LLE NINI on Monday 6/6/22

## 2022-06-02 NOTE — CONSULT NOTE ADULT - SUBJECTIVE AND OBJECTIVE BOX
HPI: 88M with PMH as listed admitted 5/31 with fall and bleeding from leg. He was found top be significantly hypotensive upon arrival here, s/p multiple units of blood, and urgently taken to OR for operative ligation of bleeding cryovein fem-AT bypass.       PERTINENT PMH REVIEWED: Yes No    PAST MEDICAL & SURGICAL HISTORY:  DM (diabetes mellitus) - resolved  AV block, 1st degree  Arrhythmia  CAD (coronary artery disease)  HTN (hypertension)  VT (ventricular tachycardia)  RICHARDS (dyspnea on exertion)  Anemia  Risk factors for obstructive sleep apnea  ESRD on dialysis HD on Mondays and Fridays  Aortic stenosis - s/p valve replacement  GI bleeding due to ulcerated polyps and colonic AVMs  H/O circumcision at  age  65  H/O left knee surgery  H/O angioplasty 2013,  no  intervention  A-V fistula left arm 5/2017  H/O carotid endarterectomy Right  S/P TAVR (transcatheter aortic valve replacement)  History of loop recorder    SOCIAL HISTORY:                      Substance history:                    Admitted from:  home                      Anabaptism/spirituality:                    Cultural concerns: none                       Surrogate -     FAMILY HISTORY:  Family history of cancer (Grandparent)  Family history of lung cancer (Grandparent)  Family history of premature CAD  FH: type 2 diabetes    Allergies    Plavix (Hives)  Toprol-XL (Rash)    ADVANCE DIRECTIVES/TREATMENT PREFERENCES:  Full code, all aggressive measures desired     Baseline ADLs (prior to admission):  Independent/ Dependent      Karnofsky/Palliative Performance Status Version 2:  %  http://npcrc.org/files/news/palliative_performance_scale_ppsv2.pdf    Present Symptoms:     Dyspnea: Mild Moderate Severe  Nausea/Vomiting: Yes No  Anxiety:  Yes No  Depression: Yes No  Fatigue: Yes No  Loss of appetite: Yes No  Constipation:     Pain:             Character-            Duration-            Effect-            Factors-            Frequency-            Location-            Severity-    Pain AD Score:  [email or web address] (press ctrl + left click to view)    Review of Systems: Reviewed                     Negative:                     Positive:  Unable to obtain due to poor mentation   All others negative    MEDICATIONS  (STANDING):  acetaminophen   IVPB .. 1000 milliGRAM(s) IV Intermittent once  chlorhexidine 2% Cloths 1 Application(s) Topical <User Schedule>  heparin   Injectable 5000 Unit(s) SubCutaneous every 8 hours  influenza  Vaccine (HIGH DOSE) 0.7 milliLiter(s) IntraMuscular once  melatonin 3 milliGRAM(s) Oral at bedtime  Nephro-pito 1 Tablet(s) Oral daily  pantoprazole    Tablet 40 milliGRAM(s) Oral every 12 hours  polyethylene glycol 3350 17 Gram(s) Oral every 12 hours  senna 2 Tablet(s) Oral at bedtime  sevelamer carbonate 800 milliGRAM(s) Oral three times a day with meals  tamsulosin 0.4 milliGRAM(s) Oral at bedtime  traZODone 50 milliGRAM(s) Oral at bedtime    MEDICATIONS  (PRN):  HYDROmorphone  Injectable 0.5 milliGRAM(s) IV Push every 3 hours PRN Moderate Pain (4 - 6)  HYDROmorphone  Injectable 0.5 milliGRAM(s) IV Push every 3 hours PRN Breakthrough pain    PHYSICAL EXAM:    Vital Signs Last 24 Hrs  T(C): 37.2 (02 Jun 2022 11:10), Max: 37.9 (01 Jun 2022 20:00)  T(F): 99 (02 Jun 2022 11:10), Max: 100.2 (01 Jun 2022 20:00)  HR: 58 (02 Jun 2022 13:00) (56 - 75)  BP: 125/57 (02 Jun 2022 13:00) (112/65 - 160/87)  BP(mean): 78 (02 Jun 2022 13:00) (71 - 115)  RR: 13 (02 Jun 2022 13:00) (11 - 35)  SpO2: 100% (02 Jun 2022 13:00) (89% - 100%)    General: alert      HEENT: normal      Lungs: comfortable     CV: normal  tachycardia    GI: normal  distended  tender  no BS               PEG/NG/OG tube  constipation  last BM:     : normal      MSK: weakness     Neuro: no focal deficits     Skin: normal  pressure ulcers- Stage_____  no rash    LABS:                      8.0    7.41  )-----------( 130      ( 02 Jun 2022 03:50 )             24.3     06-02    135  |  94<L>  |  58.9<H>  ----------------------------<  111<H>  3.8   |  25.0  |  4.26<H>    Ca    8.7      02 Jun 2022 03:50  Phos  4.5     06-02  Mg     2.3     06-02    TPro  5.9<L>  /  Alb  3.2<L>  /  TBili  0.8  /  DBili  0.4<H>  /  AST  209<H>  /  ALT  119<H>  /  AlkPhos  198<H>  06-01    PT/INR - ( 02 Jun 2022 03:50 )   PT: 15.5 sec;   INR: 1.33 ratio       PTT - ( 02 Jun 2022 03:50 )  PTT:27.1 sec    I&O's Summary    01 Jun 2022 07:01  -  02 Jun 2022 07:00  --------------------------------------------------------  IN: 625 mL / OUT: 425 mL / NET: 200 mL    RADIOLOGY & ADDITIONAL STUDIES:    < from: Xray Chest 1 View-PORTABLE IMMEDIATE (Xray Chest 1 View-PORTABLE IMMEDIATE .) (05.31.22 @ 06:07) >  IMPRESSION:  Mild pulmonary venous congestion, markedly improved.    Bilateral pleural effusions/adjacent atelectasis, right greater than   left, improved bilaterally    --- End of Report ---    < end of copied text >    < from: US Duplex Hemodialysis Access (06.01.22 @ 13:52) >  Findings/  Impression:    There is a AV fistula and the left arm, which appears to be a   radiocephalic fistula. The radial artery is patent with normal waveforms.   The fistula itself is occluded    --- End of Report ---    < end of copied text >     HPI: 88M with PMH as listed admitted 5/31 with fall and bleeding from leg. He was found top be significantly hypotensive upon arrival here, s/p multiple units of blood, and urgently taken to OR for operative ligation of bleeding cryovein fem-AT bypass. Patient with AV graft thrombosis, also with severe LLE pain. Plans for new HD access placement today. Palliative consulted to help with support and goals of care.     PERTINENT PMH REVIEWED: Yes     PAST MEDICAL & SURGICAL HISTORY:  DM (diabetes mellitus) - resolved  AV block, 1st degree  Arrhythmia  CAD (coronary artery disease)  HTN (hypertension)  VT (ventricular tachycardia)  RICHARDS (dyspnea on exertion)  Anemia  Risk factors for obstructive sleep apnea  ESRD on dialysis HD on Mondays and Fridays  Aortic stenosis - s/p valve replacement  GI bleeding due to ulcerated polyps and colonic AVMs  H/O circumcision at  age  65  H/O left knee surgery  H/O angioplasty 2013,  no  intervention  A-V fistula left arm 5/2017  H/O carotid endarterectomy Right  S/P TAVR (transcatheter aortic valve replacement)  History of loop recorder    SOCIAL HISTORY:                      Substance history: non smoker                     Admitted from:  home                      Episcopalian/spirituality:                    Cultural concerns: none                       Surrogate - wife Shameka , daughter Vanessa      FAMILY HISTORY:  Family history of cancer (Grandparent)  Family history of lung cancer (Grandparent)  Family history of premature CAD  FH: type 2 diabetes    Allergies    Plavix (Hives)  Toprol-XL (Rash)    ADVANCE DIRECTIVES/TREATMENT PREFERENCES:  Full code, all aggressive measures desired     Baseline ADLs (prior to admission):  Dependent      Karnofsky/Palliative Performance Status Version 2:  %  http://npcrc.org/files/news/palliative_performance_scale_ppsv2.pdf    Present Symptoms:     Dyspnea: none   Nausea/Vomiting: No  Anxiety:  No  Depression: unable   Fatigue: Yes   Loss of appetite: unable   Constipation: none     Pain: moaning randomly             Character-            Duration-            Effect-            Factors-            Frequency-            Location-            Severity-    Pain AD Score:  [email or web address] (press ctrl + left click to view)    Review of Systems: Reviewed                     Unable to obtain due to poor mentation   All others negative    MEDICATIONS  (STANDING):  acetaminophen   IVPB .. 1000 milliGRAM(s) IV Intermittent once  chlorhexidine 2% Cloths 1 Application(s) Topical <User Schedule>  heparin   Injectable 5000 Unit(s) SubCutaneous every 8 hours  influenza  Vaccine (HIGH DOSE) 0.7 milliLiter(s) IntraMuscular once  melatonin 3 milliGRAM(s) Oral at bedtime  Nephro-pito 1 Tablet(s) Oral daily  pantoprazole    Tablet 40 milliGRAM(s) Oral every 12 hours  polyethylene glycol 3350 17 Gram(s) Oral every 12 hours  senna 2 Tablet(s) Oral at bedtime  sevelamer carbonate 800 milliGRAM(s) Oral three times a day with meals  tamsulosin 0.4 milliGRAM(s) Oral at bedtime  traZODone 50 milliGRAM(s) Oral at bedtime    MEDICATIONS  (PRN):  HYDROmorphone  Injectable 0.5 milliGRAM(s) IV Push every 3 hours PRN Moderate Pain (4 - 6)  HYDROmorphone  Injectable 0.5 milliGRAM(s) IV Push every 3 hours PRN Breakthrough pain    PHYSICAL EXAM:    Vital Signs Last 24 Hrs  T(C): 37.2 (02 Jun 2022 11:10), Max: 37.9 (01 Jun 2022 20:00)  T(F): 99 (02 Jun 2022 11:10), Max: 100.2 (01 Jun 2022 20:00)  HR: 58 (02 Jun 2022 13:00) (56 - 75)  BP: 125/57 (02 Jun 2022 13:00) (112/65 - 160/87)  BP(mean): 78 (02 Jun 2022 13:00) (71 - 115)  RR: 13 (02 Jun 2022 13:00) (11 - 35)  SpO2: 100% (02 Jun 2022 13:00) (89% - 100%)    General: lethargic but wakes up to stimuli and able to answer simple questions. moaning randomly     HEENT: normal      Lungs: comfortable     CV: normal      GI: normal      : oliguria/anuria     MSK: weakness left LE wound     Neuro: no focal deficits     Skin: left toes with dry gangrene     LABS:                      8.0    7.41  )-----------( 130      ( 02 Jun 2022 03:50 )             24.3     06-02    135  |  94<L>  |  58.9<H>  ----------------------------<  111<H>  3.8   |  25.0  |  4.26<H>    Ca    8.7      02 Jun 2022 03:50  Phos  4.5     06-02  Mg     2.3     06-02    TPro  5.9<L>  /  Alb  3.2<L>  /  TBili  0.8  /  DBili  0.4<H>  /  AST  209<H>  /  ALT  119<H>  /  AlkPhos  198<H>  06-01    PT/INR - ( 02 Jun 2022 03:50 )   PT: 15.5 sec;   INR: 1.33 ratio       PTT - ( 02 Jun 2022 03:50 )  PTT:27.1 sec    I&O's Summary    01 Jun 2022 07:01  -  02 Jun 2022 07:00  --------------------------------------------------------  IN: 625 mL / OUT: 425 mL / NET: 200 mL    RADIOLOGY & ADDITIONAL STUDIES:    < from: Xray Chest 1 View-PORTABLE IMMEDIATE (Xray Chest 1 View-PORTABLE IMMEDIATE .) (05.31.22 @ 06:07) >  IMPRESSION:  Mild pulmonary venous congestion, markedly improved.    Bilateral pleural effusions/adjacent atelectasis, right greater than   left, improved bilaterally    --- End of Report ---    < end of copied text >    < from: US Duplex Hemodialysis Access (06.01.22 @ 13:52) >  Findings/  Impression:    There is a AV fistula and the left arm, which appears to be a   radiocephalic fistula. The radial artery is patent with normal waveforms.   The fistula itself is occluded    --- End of Report ---    < end of copied text >

## 2022-06-03 NOTE — PROGRESS NOTE ADULT - SUBJECTIVE AND OBJECTIVE BOX
Vascular Surgery Progress Note:  No acute overnight events. Patient afebrile, VSS. Pain well controlled. Tolerating diet. Yesterday  he got a Shiley  and got hemodialysis   Diet, DASH/TLC:   Sodium & Cholesterol Restricted  Soft and Bite Sized (SOFTBTSZ)  For patients receiving Renal Replacement - No Protein Restr, No Conc K, No Conc Phos, Low  Sodium (RENAL)  Supplement Feeding Modality:  Oral  Nepro Cans or Servings Per Day:  1       Frequency:  Three Times a day (06-01-22 @ 13:46)      Scheduled Medications:   acetaminophen     Tablet .. 650 milliGRAM(s) Oral every 6 hours  chlorhexidine 2% Cloths 1 Application(s) Topical <User Schedule>  heparin   Injectable 5000 Unit(s) SubCutaneous every 8 hours  influenza  Vaccine (HIGH DOSE) 0.7 milliLiter(s) IntraMuscular once  isosorbide   mononitrate ER Tablet (IMDUR) 30 milliGRAM(s) Oral daily  melatonin 3 milliGRAM(s) Oral at bedtime  Nephro-pito 1 Tablet(s) Oral daily  NIFEdipine XL 90 milliGRAM(s) Oral daily  pantoprazole    Tablet 40 milliGRAM(s) Oral every 12 hours  polyethylene glycol 3350 17 Gram(s) Oral every 12 hours  potassium chloride    Tablet ER 20 milliEquivalent(s) Oral every 2 hours  senna 2 Tablet(s) Oral at bedtime  sevelamer carbonate 800 milliGRAM(s) Oral three times a day with meals  tamsulosin 0.4 milliGRAM(s) Oral at bedtime  traZODone 100 milliGRAM(s) Oral at bedtime    PRN Medications:  HYDROmorphone  Injectable 1 milliGRAM(s) IV Push every 4 hours PRN Severe Pain (7 - 10)  HYDROmorphone  Injectable 0.25 milliGRAM(s) IV Push every 3 hours PRN Mild Pain (1 - 3)  HYDROmorphone  Injectable 0.5 milliGRAM(s) IV Push every 3 hours PRN Breakthrough pain  HYDROmorphone  Injectable 0.5 milliGRAM(s) IV Push every 3 hours PRN Moderate Pain (4 - 6)  sodium chloride 0.9% lock flush 10 milliLiter(s) IV Push every 1 hour PRN Pre/post blood products, medications, blood draw, and to maintain line patency      Objective:   T(F): 98.6 (06-03 @ 06:00), Max: 99 (06-02 @ 11:10)  HR: 66 (06-03 @ 06:00) (58 - 72)  BP: 121/69 (06-03 @ 06:00) (112/65 - 163/58)  BP(mean): 77 (06-02 @ 15:00) (73 - 88)  ABP: --  ABP(mean): --  RR: 18 (06-03 @ 06:00) (11 - 22)  SpO2: 90% (06-03 @ 06:00) (90% - 100%)      Physical Exam:   GEN: patient resting comfortably in bed, in no acute distress  RESP: respirations are unlabored, no accessory muscle use, no conversational dyspnea  CVS: RRR  GI: Abdomen soft, non-tender, non-distended, no rebound tenderness / guarding  left foot cooler than right, no pedal signals obtainable  preexisting dry gangrenous changes to the forefoot and toes   clean ace wrap in place to the distal tibial region     I&O's    06-02 @ 07:01  -  06-03 @ 07:00  --------------------------------------------------------  IN:  Total IN: 0 mL    OUT:    Other (mL): 1500 mL  Total OUT: 1500 mL    Total NET: -1500 mL          LABS:                        8.2    6.58  )-----------( 149      ( 03 Jun 2022 06:23 )             24.9     06-03    138  |  97<L>  |  43.3<H>  ----------------------------<  99  3.7   |  28.0  |  3.41<H>    Ca    8.5<L>      03 Jun 2022 06:23  Phos  3.7     06-03  Mg     2.1     06-03      PT/INR - ( 02 Jun 2022 03:50 )   PT: 15.5 sec;   INR: 1.33 ratio         PTT - ( 02 Jun 2022 03:50 )  PTT:27.1 sec

## 2022-06-03 NOTE — PROGRESS NOTE ADULT - TIME BILLING
chart review, lab review, imaging review, notes review. Discussion with  and coordination of care with all relevant providers and consultants caring for patient. discussion with daughter Vanessa at bedside regarding goals and pain management

## 2022-06-03 NOTE — CHART NOTE - NSCHARTNOTEFT_GEN_A_CORE
Rapid Response called for AMS.   RRT presented bedside, care taken over by Vasc Team who will complete documentation.   Patient upgraded to SICU.

## 2022-06-03 NOTE — CHART NOTE - NSCHARTNOTEFT_GEN_A_CORE
Rapid Response   Nurse Eliecer called rapid response for " altered mental status" as per the nurse the patient was not answering like before.  His Daughter who is  HR  / RR a nurse states that his father is different  and she feels him warm   I arrived looked at the patient  vital signs /54 HR 60  RR 17   TEMP  99 ORAL  RECTAL  100.9  Patient responds to his name, and follows commands, like squeeze my hand.  His left leg is mottled almost to his knee, what is a change from his physical exam this morning, this am his Prevena was removed and wet to dry put in his leg ( anterior aspect incision)   EKG ordered stat ., no acute changes , same compared with the previous one , paced rhythm and  av block   Labs drawn , CBC, CMP, trops,  Ordered Zosyn one dose STAT, IV fluids, lab cultures   Neurological exam done bedside patient shows no dysarthria , no lateralization , he answers questions appropriately   no focalization.  I explained in detail to her daughter and answer all her questions bedside. she is a nurse at this institution.  Dr. Barbara Sorenson informed of the situation- because of the patient acute change in the aspect of his leg and multiple comorbidities , patient is presented to the SICU PA for higher level of care

## 2022-06-03 NOTE — PROGRESS NOTE ADULT - SUBJECTIVE AND OBJECTIVE BOX
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 650 milliGRAM(s) Oral every 6 hours  chlorhexidine 2% Cloths 1 Application(s) Topical <User Schedule>  heparin   Injectable 5000 Unit(s) SubCutaneous every 8 hours  influenza  Vaccine (HIGH DOSE) 0.7 milliLiter(s) IntraMuscular once  isosorbide   mononitrate ER Tablet (IMDUR) 30 milliGRAM(s) Oral daily  melatonin 3 milliGRAM(s) Oral at bedtime  Nephro-pito 1 Tablet(s) Oral daily  NIFEdipine XL 90 milliGRAM(s) Oral daily  pantoprazole    Tablet 40 milliGRAM(s) Oral every 12 hours  polyethylene glycol 3350 17 Gram(s) Oral every 12 hours  potassium chloride    Tablet ER 20 milliEquivalent(s) Oral every 2 hours  senna 2 Tablet(s) Oral at bedtime  sevelamer carbonate 800 milliGRAM(s) Oral three times a day with meals  tamsulosin 0.4 milliGRAM(s) Oral at bedtime  traZODone 100 milliGRAM(s) Oral at bedtime    PRN Inpatient Medications  HYDROmorphone  Injectable 1 milliGRAM(s) IV Push every 4 hours PRN  HYDROmorphone  Injectable 0.25 milliGRAM(s) IV Push every 3 hours PRN  HYDROmorphone  Injectable 0.5 milliGRAM(s) IV Push every 3 hours PRN  HYDROmorphone  Injectable 0.5 milliGRAM(s) IV Push every 3 hours PRN  sodium chloride 0.9% lock flush 10 milliLiter(s) IV Push every 1 hour PRN      REVIEW OF SYSTEMS  --------------------------------------------------------------------------------  Gen: No weight changes, fatigue, fevers/chills, weakness  Skin: No rashes  Head/Eyes/Ears/Mouth: No headache  Respiratory: No dyspnea, cough,  CV: No chest pain, orthopnea  GI: No abdominal pain, diarrhea, constipation, nausea, vomiting,  MSK: No joint pain  Neuro: No dizziness/lightheadedness, weakness  Heme: No bleeding  Psych: No significant nervousness, anxiety, stress, depression    All other systems were reviewed and are negative, except as noted.    VITALS/PHYSICAL EXAM  --------------------------------------------------------------------------------  T(C): 37 (06-03-22 @ 06:00), Max: 37.2 (06-02-22 @ 11:10)  HR: 66 (06-03-22 @ 06:00) (58 - 72)  BP: 121/69 (06-03-22 @ 06:00) (112/65 - 163/58)  RR: 18 (06-03-22 @ 06:00) (11 - 22)  SpO2: 90% (06-03-22 @ 06:00) (90% - 100%)  Wt(kg): --        06-02-22 @ 07:01  -  06-03-22 @ 07:00  --------------------------------------------------------  IN: 0 mL / OUT: 1500 mL / NET: -1500 mL      Physical Exam:  	Gen: awake  	HEENT: on supplemental o2  	Pulm: CTA B/L  	CV: RRR, S1S2; no rub  	Abd: +BS, soft, nontender/nondistended  	: No suprapubic tenderness  	UE: Warm; no edema  	LE: Warm, left leg edema++  	Neuro: Non focal  	Psych: confused  	Skin: Warm, pallor+  	Vascular access: AVF    LABS/STUDIES  --------------------------------------------------------------------------------              8.2    6.58  >-----------<  149      [06-03-22 @ 06:23]              24.9     138  |  97  |  43.3  ----------------------------<  99      [06-03-22 @ 06:23]  3.7   |  28.0  |  3.41        Ca     8.5     [06-03-22 @ 06:23]      Mg     2.1     [06-03-22 @ 06:23]      Phos  3.7     [06-03-22 @ 06:23]      PT/INR: PT 15.5 , INR 1.33       [06-02-22 @ 03:50]  PTT: 27.1       [06-02-22 @ 03:50]    CK 99      [06-01-22 @ 21:40]    Iron 43, TIBC 249, %sat 17      [07-13-21 @ 16:07]  Ferritin 498      [07-13-21 @ 16:07]  HbA1c 4.9      [08-09-18 @ 05:54]    HBsAg Nonreact      [05-16-22 @ 11:20]

## 2022-06-03 NOTE — CHART NOTE - NSCHARTNOTEFT_GEN_A_CORE
Patient was a rapid response today for altered mental status, no organ dysfunction found however, likely secondary to ischemic limb. Patient was a rapid response today for altered mental status, no organ dysfunction found however, likely secondary to ischemic limb.    Vitals remain stable.     Plan for OR tonight for AKA.  SICU will optimize patient and monitor post operatively.     Attending on board with plan    Family updated by vascular team.

## 2022-06-03 NOTE — GOALS OF CARE CONVERSATION - ADVANCED CARE PLANNING - CONVERSATION DETAILS
Conversation held with patient, wife Shameka, daughter Vanessa, grand daughter Sharonda at bedside regarding overall condition, prognosis, and goals of care. They want to continue conservative measures and give him more time, they are still in agreement with continuing HD, and amputation possibility but should he deteriorate, they would not want CPR or mechanical ventilation. Wife Shameka states " I do not want to lose him, but I do not want him to suffer." Also discussed high risk for complications, decompensation, strokes, etc. A lot of emotional support. Family reminisced about the good times, his quality life, how he cared for the grand kids. Conversation held with patient, wife Shameka, daughter Vanessa, grand daughter Sharonda at bedside regarding overall condition, prognosis, and goals of care. They want to continue conservative measures and give him more time, they are still in agreement with continuing HD, and amputation possibility but should he deteriorate, they would not want CPR or mechanical ventilation. They are aware this will likely be rescinded for the OR. Wife Shameka states " I do not want to lose him, but I do not want him to suffer." Also discussed high risk for complications, decompensation, strokes, etc. A lot of emotional support. Family reminisced about the good times, his quality life, how he cared for the grand kids.

## 2022-06-03 NOTE — PROGRESS NOTE ADULT - SUBJECTIVE AND OBJECTIVE BOX
OVERNIGHT EVENTS: did not tolerate tunneled HD catheter, temporary access for HD placed in right groin     Present Symptoms:     Dyspnea: none   Nausea/Vomiting: No  Anxiety:  No  Depression: No  Fatigue: Yes   Loss of appetite: No  Constipation: none     Pain: random moaning but recently received Dilaudid so no pain currently             Character-            Duration-            Effect-            Factors-            Frequency-            Location-            Severity-    Pain AD Score:  http://geriatrictoolkit.[facility]/cog/painad.pdf (press ctrl + left click to view)    Review of Systems: Reviewed                  Unable to obtain due to poor mentation   All others negative    MEDICATIONS  (STANDING):  acetaminophen     Tablet .. 650 milliGRAM(s) Oral every 6 hours  chlorhexidine 2% Cloths 1 Application(s) Topical <User Schedule>  chlorhexidine 4% Liquid 1 Application(s) Topical <User Schedule>  heparin   Injectable 5000 Unit(s) SubCutaneous every 8 hours  influenza  Vaccine (HIGH DOSE) 0.7 milliLiter(s) IntraMuscular once  isosorbide   mononitrate ER Tablet (IMDUR) 30 milliGRAM(s) Oral daily  melatonin 3 milliGRAM(s) Oral at bedtime  Nephro-pito 1 Tablet(s) Oral daily  NIFEdipine XL 90 milliGRAM(s) Oral daily  pantoprazole    Tablet 40 milliGRAM(s) Oral every 12 hours  polyethylene glycol 3350 17 Gram(s) Oral every 12 hours  senna 2 Tablet(s) Oral at bedtime  sevelamer carbonate 800 milliGRAM(s) Oral three times a day with meals  tamsulosin 0.4 milliGRAM(s) Oral at bedtime  traZODone 100 milliGRAM(s) Oral at bedtime    MEDICATIONS  (PRN):  HYDROmorphone  Injectable 1 milliGRAM(s) IV Push every 4 hours PRN Severe Pain (7 - 10)  HYDROmorphone  Injectable 0.25 milliGRAM(s) IV Push every 3 hours PRN Mild Pain (1 - 3)  HYDROmorphone  Injectable 0.5 milliGRAM(s) IV Push every 3 hours PRN Breakthrough pain  HYDROmorphone  Injectable 0.5 milliGRAM(s) IV Push every 3 hours PRN Moderate Pain (4 - 6)  sodium chloride 0.9% lock flush 10 milliLiter(s) IV Push every 1 hour PRN Pre/post blood products, medications, blood draw, and to maintain line patency    PHYSICAL EXAM:    Vital Signs Last 24 Hrs  T(C): 37 (03 Jun 2022 06:00), Max: 37.2 (02 Jun 2022 11:10)  T(F): 98.6 (03 Jun 2022 06:00), Max: 99 (02 Jun 2022 11:10)  HR: 66 (03 Jun 2022 06:00) (58 - 72)  BP: 121/69 (03 Jun 2022 06:00) (112/65 - 163/58)  BP(mean): 77 (02 Jun 2022 15:00) (73 - 88)  RR: 18 (03 Jun 2022 06:00) (11 - 22)  SpO2: 90% (03 Jun 2022 06:00) (90% - 100%)    General: cachexia     Karnofsky:  30 %    HEENT: normal     Lungs: comfortable     CV: normal      GI: normal      : oliguria/anuria      MSK: weakness      Skin: no rash    LABS:                        8.2    6.58  )-----------( 149      ( 03 Jun 2022 06:23 )             24.9     06-03    138  |  97<L>  |  43.3<H>  ----------------------------<  99  3.7   |  28.0  |  3.41<H>    Ca    8.5<L>      03 Jun 2022 06:23  Phos  3.7     06-03  Mg     2.1     06-03    PT/INR - ( 02 Jun 2022 03:50 )   PT: 15.5 sec;   INR: 1.33 ratio       PTT - ( 02 Jun 2022 03:50 )  PTT:27.1 sec    I&O's Summary    02 Jun 2022 07:01  -  03 Jun 2022 07:00  --------------------------------------------------------  IN: 0 mL / OUT: 1500 mL / NET: -1500 mL    RADIOLOGY & ADDITIONAL STUDIES:    ADVANCE DIRECTIVES/TREATMENT PREFERENCES:  Full code

## 2022-06-03 NOTE — CHART NOTE - NSCHARTNOTEFT_GEN_A_CORE
Downgrade note:  pt was downgraded from ICU after getting HD. pt is complaining from foot pain.    Exam:  VSS, afebrile    left foot cooler than right, no pedal signals obtainable  preexisting dry gangrenous changes to the forefoot and toes.    Plan:  Pain control  OR for AKA on 6/6

## 2022-06-03 NOTE — PROGRESS NOTE ADULT - ASSESSMENT
88M with  ESRD on HD, anemia, CHF with preserved EF, HTN, CAD, AFIB, PVD s/p recent bypass, LLE DVT, recently admitted with gastrointestinal bleeding, here with hemorrhagic shock related to graft bleeding s/p ligation of bleeding CryoVein fem-AT bypass, with AV graft thrombosis.     #1 Hemorrhagic shock, Fem-AT bypass site bleeding  - s/p ligation 5/31  - shock resolved  - s/p PRBCs and resuscitation    #2 AV graft thrombosis  - new temporary access in right groin placed 6/2  - will need tunneled catheter     #3 LLE pain  - continue PRN dilaudid  - can do ATC Tylenol if can take PO post procedure   - per daughter he did not respond well to Neurontin or lyrica in the past as he got more confused.     #4 ESRD  - HD to be reinitiated once new access established   - temporary right groin access placed 6/2     #5 DVT, Afib  - not a candidate for AC given multiple bleeding episodes     #6 Encounter for palliative care   - met with daughter Vanessa at bedside, support provided  - wife to come later today will hope to meet with her at bedside to further address code status    88M with  ESRD on HD, anemia, CHF with preserved EF, HTN, CAD, AFIB, PVD s/p recent bypass, LLE DVT, recently admitted with gastrointestinal bleeding, here with hemorrhagic shock related to graft bleeding s/p ligation of bleeding CryoVein fem-AT bypass, with AV graft thrombosis.     #1 Hemorrhagic shock, Fem-AT bypass site bleeding  - s/p ligation 5/31  - shock resolved  - s/p PRBCs and resuscitation    #2 AV graft thrombosis  - new temporary access in right groin placed 6/2  - will need tunneled catheter     #3 LLE pain, ischemia   - continue PRN dilaudid  - can do ATC Tylenol if can take PO post procedure   - per daughter he did not respond well to Neurontin or lyrica in the past as he got more confused.   - AKA planned for 6/6     #4 ESRD  - HD to be reinitiated once new access established   - temporary right groin access placed 6/2     #5 DVT, Afib  - not a candidate for AC given multiple bleeding episodes     #6 Encounter for palliative care   - met with daughter Vanessa at bedside, support provided  - wife to come later today will hope to meet with her at bedside to further address code status

## 2022-06-03 NOTE — PROGRESS NOTE ADULT - ASSESSMENT
Assessment and Plan:   · Assessment	  88yo M POD 2 from ligation of LLE bypass conduit for graft blowout at distal anastomosis. H/H has remained stable. Patient did  tolerate HD yesterday   He will be scheduled  for AKA   Plan:  -Planning for LLE AKA on Monday 6/6/22  -Continue to monitor LE   -Trend H/H, transfuse PRN  -Rest of care per SICU team  -Vascular surgery following

## 2022-06-03 NOTE — PROGRESS NOTE ADULT - SUBJECTIVE AND OBJECTIVE BOX
D/W ACS Team,     S/P RR ( AMS )     AKA Planned in AM ( 7:30 )    HD Post Surgery - as Needed,     Maintain Hgb., 10-11.5 gms.,

## 2022-06-03 NOTE — PROGRESS NOTE ADULT - ASSESSMENT
ESRD on HD  AVG thrombosis, now has a femoral non tdc   HD treatment tomorrow  hemorrhagic shock related to LE graft blow out, LLE ischemia-  plan for AKA on 06/06  HB remains low/ stable- PRBC transfusion with HD to keep Hb > 9  continue AMY

## 2022-06-04 NOTE — PROGRESS NOTE ADULT - ASSESSMENT
86yo M POD 1 lt aka. pt tolerated procedure well    Plan:   - pt can continue heparin/ diet. pt should continue Zosyn. Dressing with xeroform, gauze, abd, kerlix, aace, ioban. take down in three days. i.e on Tuesday  -Trend H/H, transfuse PRN  -Rest of care per SICU team  -Vascular surgery following

## 2022-06-04 NOTE — PROGRESS NOTE ADULT - SUBJECTIVE AND OBJECTIVE BOX
24 HOUR EVENTS: Patient s/p AKA and placement of R IJ permacatheter.  Case was uncomplicated. Patient came back extubated, off pressors.  Reporting some discomfort, given pain medication with improvement.  Vitals remain stable. , h/h stable. No leukocytosis, afebrile.     SUBJECTIVE/ROS:  [ ] A ten-point review of systems was otherwise negative except as noted.  [x ] Due to altered mental status/intubation, subjective information were not able to be obtained from the patient. History was obtained, to the extent possible, from review of the chart and collateral sources of information.      NEURO  RASS:  0   GCS: 14     CAM ICU: positive   Exam: Alert but confused with c/o of pain to LLE.  Non-focal.  Moving all extremities, intermittently following commands   Meds: acetaminophen     Tablet .. 650 milliGRAM(s) Oral every 6 hours  HYDROmorphone  Injectable 0.25 milliGRAM(s) IV Push every 3 hours PRN Mild Pain (1 - 3)  HYDROmorphone  Injectable 0.5 milliGRAM(s) IV Push every 3 hours PRN Breakthrough pain  HYDROmorphone  Injectable 0.5 milliGRAM(s) IV Push every 3 hours PRN Moderate Pain (4 - 6)  melatonin 3 milliGRAM(s) Oral at bedtime  traZODone 100 milliGRAM(s) Oral at bedtime    [x] Adequacy of sedation and pain control has been assessed and adjusted      RESPIRATORY  RR: 18 (06-04-22 @ 01:00) (16 - 26)  SpO2: 96% (06-04-22 @ 01:00) (90% - 98%)  Wt(kg): --  Exam: unlabored, clear to auscultation bilaterally  Mechanical Ventilation:   ABG - ( 03 Jun 2022 15:44 )  pH: x     /  pCO2: x     /  pO2: x     / HCO3: x     / Base Excess: x     /  SaO2: x       Lactate: 1.7              [ ] Extubation Readiness Assessed  Meds:       CARDIOVASCULAR  HR: 57 (06-04-22 @ 01:00) (55 - 74)  BP: 105/45 (06-04-22 @ 01:00) (96/48 - 122/50)  BP(mean): 61 (06-04-22 @ 01:00) (61 - 90)  ABP: --  ABP(mean): --  Wt(kg): --  CVP(cm H2O): --    Lactate, Blood: 1.7 mmol/L (06-03 @ 15:44)    Exam: nsr  Cardiac Rhythm: nsr  Perfusion     [x ]Adequate   [ ]Inadequate  Mentation   [x ]Normal       [ ]Reduced  Extremities  [ ]Warm         [ ]Cool  Volume Status [ ]Hypervolemic [x ]Euvolemic [ ]Hypovolemic  Meds: isosorbide   mononitrate ER Tablet (IMDUR) 30 milliGRAM(s) Oral daily  NIFEdipine XL 90 milliGRAM(s) Oral daily  tamsulosin 0.4 milliGRAM(s) Oral at bedtime        GI/NUTRITION  Exam: soft, nttp, nd  Diet: Reg (soft & bite sized)   Meds: pantoprazole    Tablet 40 milliGRAM(s) Oral every 12 hours  polyethylene glycol 3350 17 Gram(s) Oral every 12 hours  senna 2 Tablet(s) Oral at bedtime      GENITOURINARY  I&O's Detail    06-02 @ 07:01  -  06-03 @ 07:00  --------------------------------------------------------  IN:  Total IN: 0 mL    OUT:    Other (mL): 1500 mL  Total OUT: 1500 mL    Total NET: -1500 mL          06-04    139  |  98  |  56.4<H>  ----------------------------<  161<H>  4.9   |  26.0  |  4.22<H>    Ca    8.4<L>      04 Jun 2022 00:38  Phos  3.6     06-04  Mg     2.1     06-04    TPro  6.3<L>  /  Alb  3.4  /  TBili  0.7  /  DBili  x   /  AST  61<H>  /  ALT  15  /  AlkPhos  255<H>  06-03    [ ] Camarena catheter, indication: N/A  Meds: dextrose 5%. 1000 milliLiter(s) IV Continuous <Continuous>  dextrose 5%. 1000 milliLiter(s) IV Continuous <Continuous>  Nephro-pito 1 Tablet(s) Oral daily  sodium chloride 0.9% lock flush 10 milliLiter(s) IV Push every 1 hour PRN Pre/post blood products, medications, blood draw, and to maintain line patency    Ext: 2+ femoral peripheral pulses, shiley R groin, AKA dry iodine band over site, dressing intact     HEMATOLOGIC  Meds: heparin   Injectable 5000 Unit(s) SubCutaneous every 8 hours    [x] VTE Prophylaxis                        8.3    6.70  )-----------( 169      ( 04 Jun 2022 00:38 )             25.9     PT/INR - ( 03 Jun 2022 15:44 )   PT: 13.9 sec;   INR: 1.20 ratio         PTT - ( 03 Jun 2022 15:44 )  PTT:33.7 sec  Transfusion     [ ] PRBC   [ ] Platelets   [ ] FFP   [ ] Cryoprecipitate      INFECTIOUS DISEASES  T(C): 36.7 (06-04-22 @ 00:00), Max: 38.3 (06-03-22 @ 15:09)  Wt(kg): --  WBC Count: 6.70 K/uL (06-04 @ 00:38)  WBC Count: 7.45 K/uL (06-03 @ 15:44)  WBC Count: 6.58 K/uL (06-03 @ 06:23)    Recent Cultures:    Meds: influenza  Vaccine (HIGH DOSE) 0.7 milliLiter(s) IntraMuscular once  piperacillin/tazobactam IVPB.. 3.375 Gram(s) IV Intermittent every 12 hours        ENDOCRINE  Capillary Blood Glucose    Meds: dextrose 50% Injectable 25 Gram(s) IV Push once  dextrose 50% Injectable 12.5 Gram(s) IV Push once  dextrose 50% Injectable 25 Gram(s) IV Push once  dextrose Oral Gel 15 Gram(s) Oral once PRN  glucagon  Injectable 1 milliGRAM(s) IntraMuscular once        ACCESS DEVICES:  [ ] Peripheral IV  [ ] Central Venous Line	[ ] R	[ ] L	[ ] IJ	[ ] Fem	[ ] SC	Placed:   [ ] Arterial Line		[ ] R	[ ] L	[ ] Fem	[ ] Rad	[ ] Ax	Placed:   [ ] PICC:					[ ] Mediport  [ ] Urinary Catheter, Date Placed:   [ ] Necessity of urinary, arterial, and venous catheters discussed    OTHER MEDICATIONS:  chlorhexidine 2% Cloths 1 Application(s) Topical <User Schedule>  sevelamer carbonate 800 milliGRAM(s) Oral three times a day with meals      CODE STATUS: Yes        IMAGING:

## 2022-06-04 NOTE — CHART NOTE - NSCHARTNOTEFT_GEN_A_CORE
SICU TRANSFER NOTE  -----------------------------  ICU Admission Date: XXXXX  Transfer Date: 06-04-22 @ 16:58    Admission Diagnosis: Hemorrhagic shock, LLE limb ischemia    Active Problems/injuries: LLE limb ischemia, ESRD    Procedures: 5/31: Ligation of bleeding cryovein Fem-AT bypass  6/4: Left AKA, RIJ Permcath placed    Consultants:  [ ] Cardiology  [ ] Endocrine  [ ] Infectious Disease  [ ] Medicine  [X] Nephrology  [ ] Neurosurgery  [ ] Ortho       [ ] Weight Bearing Status:  [X] Palliative       [X] Advanced Directives: DNR/DNI  [ ] Physical Medicine and Rehab       [ ] Disposition :   [ ] Plastics  [ ] Pulmonary    Medications  acetaminophen     Tablet .. 650 milliGRAM(s) Oral every 6 hours  aspirin  chewable 81 milliGRAM(s) Oral daily  chlorhexidine 2% Cloths 1 Application(s) Topical <User Schedule>  dextrose 5%. 1000 milliLiter(s) IV Continuous <Continuous>  dextrose 5%. 1000 milliLiter(s) IV Continuous <Continuous>  dextrose 50% Injectable 25 Gram(s) IV Push once  dextrose 50% Injectable 12.5 Gram(s) IV Push once  dextrose 50% Injectable 25 Gram(s) IV Push once  dextrose Oral Gel 15 Gram(s) Oral once PRN  glucagon  Injectable 1 milliGRAM(s) IntraMuscular once  heparin   Injectable 5000 Unit(s) SubCutaneous every 8 hours  HYDROmorphone  Injectable 0.25 milliGRAM(s) IV Push every 3 hours PRN  HYDROmorphone  Injectable 0.5 milliGRAM(s) IV Push every 3 hours PRN  HYDROmorphone  Injectable 0.5 milliGRAM(s) IV Push every 3 hours PRN  influenza  Vaccine (HIGH DOSE) 0.7 milliLiter(s) IntraMuscular once  isosorbide   mononitrate ER Tablet (IMDUR) 30 milliGRAM(s) Oral daily  LORazepam   Injectable 0.5 milliGRAM(s) IV Push every 1 hour  melatonin 3 milliGRAM(s) Oral at bedtime  Nephro-pito 1 Tablet(s) Oral daily  NIFEdipine XL 90 milliGRAM(s) Oral daily  pantoprazole    Tablet 40 milliGRAM(s) Oral every 12 hours  piperacillin/tazobactam IVPB.. 3.375 Gram(s) IV Intermittent every 12 hours  polyethylene glycol 3350 17 Gram(s) Oral every 12 hours  senna 2 Tablet(s) Oral at bedtime  sodium chloride 0.9% lock flush 10 milliLiter(s) IV Push every 1 hour PRN  traZODone 100 milliGRAM(s) Oral at bedtime      [X] I attest I have reviewed and reconciled all medications prior to transfer    Antibiotics:  piperacillin/tazobactam IVPB.. 3.375 Gram(s) IV Intermittent every 12 hours    Indication: Empiric ppx for LLE infection End Date: Per primary team now that source control is achieved with AKA      I have discussed this case with Iam Joseph MD Resident upon transfer and all questions regarding ICU course were answered.  The following items are to be followed up:  1. LLE dressing to be taken down on POD #3 by primary team  2. HD via Permcath per Renal  3. If Permcath functions for HD session, Femoral Shiley can be removed  4. Continue ASA  5. f/u blood cultures  6. Zosyn course per primary team now that source control is achieved. Titrate based off bacteremia or not.

## 2022-06-04 NOTE — BRIEF OPERATIVE NOTE - NSICDXBRIEFPOSTOP_GEN_ALL_CORE_FT
POST-OP DIAGNOSIS:  S/P above knee amputation, left 04-Jun-2022 00:19:14  Enzo Molina  
POST-OP DIAGNOSIS:  Hemorrhagic shock 31-May-2022 09:31:45  Basilio Moreno

## 2022-06-04 NOTE — PROGRESS NOTE ADULT - SUBJECTIVE AND OBJECTIVE BOX
Subjective:    Patient s/p AKA and placement of R IJ permacatheter.  Case was uncomplicated. Patient came back extubated, off pressors.  Reporting some discomfort, given pain medication with improvement.  Vitals remain stable. , h/h stable. No leukocytosis, afebrile.       MEDICATIONS  (STANDING):  acetaminophen     Tablet .. 650 milliGRAM(s) Oral every 6 hours  chlorhexidine 2% Cloths 1 Application(s) Topical <User Schedule>  dextrose 5%. 1000 milliLiter(s) (50 mL/Hr) IV Continuous <Continuous>  dextrose 5%. 1000 milliLiter(s) (100 mL/Hr) IV Continuous <Continuous>  dextrose 50% Injectable 25 Gram(s) IV Push once  dextrose 50% Injectable 12.5 Gram(s) IV Push once  dextrose 50% Injectable 25 Gram(s) IV Push once  glucagon  Injectable 1 milliGRAM(s) IntraMuscular once  heparin   Injectable 5000 Unit(s) SubCutaneous every 8 hours  influenza  Vaccine (HIGH DOSE) 0.7 milliLiter(s) IntraMuscular once  isosorbide   mononitrate ER Tablet (IMDUR) 30 milliGRAM(s) Oral daily  melatonin 3 milliGRAM(s) Oral at bedtime  Nephro-pito 1 Tablet(s) Oral daily  NIFEdipine XL 90 milliGRAM(s) Oral daily  pantoprazole    Tablet 40 milliGRAM(s) Oral every 12 hours  piperacillin/tazobactam IVPB.. 3.375 Gram(s) IV Intermittent every 12 hours  polyethylene glycol 3350 17 Gram(s) Oral every 12 hours  senna 2 Tablet(s) Oral at bedtime  sevelamer carbonate 800 milliGRAM(s) Oral three times a day with meals  tamsulosin 0.4 milliGRAM(s) Oral at bedtime  traZODone 100 milliGRAM(s) Oral at bedtime    MEDICATIONS  (PRN):  dextrose Oral Gel 15 Gram(s) Oral once PRN Blood Glucose LESS THAN 70 milliGRAM(s)/deciliter  HYDROmorphone  Injectable 0.25 milliGRAM(s) IV Push every 3 hours PRN Mild Pain (1 - 3)  HYDROmorphone  Injectable 0.5 milliGRAM(s) IV Push every 3 hours PRN Breakthrough pain  HYDROmorphone  Injectable 0.5 milliGRAM(s) IV Push every 3 hours PRN Moderate Pain (4 - 6)  sodium chloride 0.9% lock flush 10 milliLiter(s) IV Push every 1 hour PRN Pre/post blood products, medications, blood draw, and to maintain line patency      Vital Signs Last 24 Hrs  T(C): 36.7 (2022 00:00), Max: 38.3 (2022 15:09)  T(F): 98 (2022 00:00), Max: 100.9 (2022 15:09)  HR: 70 (2022 02:00) (55 - 74)  BP: 113/52 (2022 02:00) (96/48 - 122/50)  BP(mean): 71 (2022 02:00) (61 - 90)  RR: 17 (2022 02:00) (16 - 26)  SpO2: 100% (2022 02:00) (90% - 100%)        --------------------------------------------------------  IN:  Total IN: 0 mL    OUT:    Other (mL): 1500 mL  Total OUT: 1500 mL    Total NET: -1500 mL        --------------------------------------------------------  IN:    IV PiggyBack: 25 mL  Total IN: 25 mL    OUT:  Total OUT: 0 mL    Total NET: 25 mL        Physical Exam:   GEN: patient resting comfortably in bed, in no acute distress  RESP: respirations are unlabored, no accessory muscle use, no conversational dyspnea  CVS: RRR  GI: Abdomen soft, non-tender, non-distended, no rebound tenderness / guarding  lt lower extremity s/p lt AKA dressing in place, dressing is cdi      LABS:                        8.3    6.70  )-----------( 169      ( 2022 00:38 )             25.9         139  |  98  |  56.4<H>  ----------------------------<  161<H>  4.9   |  26.0  |  4.22<H>    Ca    8.4<L>      2022 00:38  Phos  3.6     06-  Mg     2.1     -    TPro  6.3<L>  /  Alb  3.4  /  TBili  0.7  /  DBili  x   /  AST  61<H>  /  ALT  15  /  AlkPhos  255<H>  06    PT/INR - ( 2022 15:44 )   PT: 13.9 sec;   INR: 1.20 ratio         PTT - ( 2022 15:44 )  PTT:33.7 sec  Urinalysis Basic - ( 2022 18:57 )    Color: Yellow / Appearance: Clear / S.010 / pH: x  Gluc: x / Ketone: Negative  / Bili: Small / Urobili: Negative mg/dL   Blood: x / Protein: 100 / Nitrite: Negative   Leuk Esterase: Trace / RBC: 0-2 /HPF / WBC 11-25 /HPF   Sq Epi: x / Non Sq Epi: Occasional / Bacteria: Occasional

## 2022-06-04 NOTE — BRIEF OPERATIVE NOTE - OPERATION/FINDINGS
uneventful procedures.  pt can continue heparin/ diet. pt should continue Zosyn. Dressing with xeroform, gauze, abd, kerlix, aace, ioban. take down in three days. i.e on Tuesday
distal aspect of the bypass graft with linear tear , 3mm from the anastomosis

## 2022-06-04 NOTE — BRIEF OPERATIVE NOTE - NSICDXBRIEFPREOP_GEN_ALL_CORE_FT
PRE-OP DIAGNOSIS:  PAD (peripheral artery disease) 04-Jun-2022 00:18:33  Enzo Molina  
PRE-OP DIAGNOSIS:  Hemorrhagic shock 31-May-2022 09:31:33  Basilio Moreno

## 2022-06-04 NOTE — PROGRESS NOTE ADULT - SUBJECTIVE AND OBJECTIVE BOX
Subjective:    Patient s/p AKA and placement of R IJ TDC ,       Case was uncomplicated. Patient came back extubated, off pressors.      Reporting some discomfort, given pain medication with improvement.      Vitals remain stable. , h/h stable. No leukocytosis, afebrile.     Demented, Intermittently Screaming,     MEDICATIONS  (STANDING):    acetaminophen     Tablet .. 650 milliGRAM(s) Oral every 6 hours  chlorhexidine 2% Cloths 1 Application(s) Topical <User Schedule>  dextrose 5%. 1000 milliLiter(s) (50 mL/Hr) IV Continuous <Continuous>  dextrose 5%. 1000 milliLiter(s) (100 mL/Hr) IV Continuous <Continuous>  dextrose 50% Injectable 25 Gram(s) IV Push once  dextrose 50% Injectable 12.5 Gram(s) IV Push once  dextrose 50% Injectable 25 Gram(s) IV Push once  glucagon  Injectable 1 milliGRAM(s) IntraMuscular once  heparin   Injectable 5000 Unit(s) SubCutaneous every 8 hours  influenza  Vaccine (HIGH DOSE) 0.7 milliLiter(s) IntraMuscular once  isosorbide   mononitrate ER Tablet (IMDUR) 30 milliGRAM(s) Oral daily  melatonin 3 milliGRAM(s) Oral at bedtime  Nephro-pito 1 Tablet(s) Oral daily  NIFEdipine XL 90 milliGRAM(s) Oral daily  pantoprazole    Tablet 40 milliGRAM(s) Oral every 12 hours  piperacillin/tazobactam IVPB.. 3.375 Gram(s) IV Intermittent every 12 hours  polyethylene glycol 3350 17 Gram(s) Oral every 12 hours  senna 2 Tablet(s) Oral at bedtime  sevelamer carbonate 800 milliGRAM(s) Oral three times a day with meals  tamsulosin 0.4 milliGRAM(s) Oral at bedtime  traZODone 100 milliGRAM(s) Oral at bedtime    MEDICATIONS  (PRN):  dextrose Oral Gel 15 Gram(s) Oral once PRN Blood Glucose LESS THAN 70 milliGRAM(s)/deciliter  HYDROmorphone  Injectable 0.25 milliGRAM(s) IV Push every 3 hours PRN Mild Pain (1 - 3)  HYDROmorphone  Injectable 0.5 milliGRAM(s) IV Push every 3 hours PRN Breakthrough pain  HYDROmorphone  Injectable 0.5 milliGRAM(s) IV Push every 3 hours PRN Moderate Pain (4 - 6)  sodium chloride 0.9% lock flush 10 milliLiter(s) IV Push every 1 hour PRN Pre/post blood products, medications, blood draw, and to maintain line patency    Vital Signs Last 24 Hrs  T(C): 36.7 (2022 00:00), Max: 38.3 (2022 15:09)  T(F): 98 (2022 00:00), Max: 100.9 (2022 15:09)  HR: 70 (2022 02:00) (55 - 74)  BP: 113/52 (2022 02:00) (96/48 - 122/50)  BP(mean): 71 (2022 02:00) (61 - 90)  RR: 17 (2022 02:00) (16 - 26)  SpO2: 100% (2022 02:00) (90% - 100%)      --------------------------------------------------------  IN:  Total IN: 0 mL    OUT:    Other (mL): 1500 mL  Total OUT: 1500 mL    Total NET: -1500 mL      --------------------------------------------------------  IN:    IV PiggyBack: 25 mL  Total IN: 25 mL    OUT:  Total OUT: 0 mL    Total NET: 25 mL    Physical Exam:   GEN: patient resting comfortably in bed, in no acute distress  RESP: respirations are unlabored, no accessory muscle use, no conversational dyspnea  CVS: RRR  GI: Abdomen soft, non-tender, non-distended, no rebound tenderness / guarding  lt lower extremity s/p lt AKA dressing in place, dressing is cdi    LABS:                        8.3    6.70  )-----------( 169      ( 2022 00:38 )             25.9     06-04    139  |  98  |  56.4<H>  ----------------------------<  161<H>  4.9   |  26.0  |  4.22<H>    Ca    8.4<L>      2022 00:38  Phos  3.6     06-  Mg     2.1     06-    TPro  6.3<L>  /  Alb  3.4  /  TBili  0.7  /  DBili  x   /  AST  61<H>  /  ALT  15  /  AlkPhos  255<H>  -    PT/INR - ( 2022 15:44 )   PT: 13.9 sec;   INR: 1.20 ratio         PTT - ( 2022 15:44 )  PTT:33.7 sec  Urinalysis Basic - ( 2022 18:57 )    Color: Yellow / Appearance: Clear / S.010 / pH: x  Gluc: x / Ketone: Negative  / Bili: Small / Urobili: Negative mg/dL   Blood: x / Protein: 100 / Nitrite: Negative   Leuk Esterase: Trace / RBC: 0-2 /HPF / WBC 11-25 /HPF   Sq Epi: x / Non Sq Epi: Occasional / Bacteria: Occasional    88yo M POD 1 lt aka. pt tolerated procedure well    Plan:  -On heparin/ diet. pt should continue Zosyn.           Per VS : Dressing with xeroform, gauze, abd, kerlix, aace, ioban. take down in three days. i.e on Tuesday  -Trend H/H, transfuse PRN  -Rest of care per SICU team

## 2022-06-04 NOTE — PROGRESS NOTE ADULT - ASSESSMENT
A/p: 89 yo Male with PMH of ESRD on HD, GI bleed secondary to AVMs, CAD, hypertension, PPM, HFpEF, hypertension, hyperlipidemia, CAD, atrial fibrillation, LLE DVT, aortic stenosis s/p TAVR, PAD s/p recent L fem to AT bypass with cryovein c/b acute graft rethrombosis s/p thrombectomy presenting with hemorrhagic shock s/p rupture of cryovein Fem-AT bypass s/p ligation of graft. Hemorrhagic shock resolved, now s/p AKA, placement of RIJ dialysis catheter     Neuro: Continue adequate pain regimen. Delirium precautions     Card: No hemodynamic issues. Lactate cleared.     Pulm: Aggressive pulmonary tolieting. IS    GI: Regular diet    : Monitor UOP. Dialysis via permacatheter. Will discontinue femoral groin line    Hem/DVT: SCDs, h/h stable.     DNR    Dispo: Downgrade level of care

## 2022-06-04 NOTE — PROGRESS NOTE ADULT - ASSESSMENT
Patient was seen and evaluated on dialysis.   Patient is tolerating the procedure well.   T(C): 36.7 (06-04-22 @ 07:34), Max: 38.3 (06-03-22 @ 15:09)  HR: 72 (06-04-22 @ 09:00) (55 - 74)  BP: 120/55 (06-04-22 @ 09:00) (96/48 - 122/58)  Continue dialysis:  On Monday,   Dialyzer: Revaclear 300          QB:  400 ml.,       QD: 500ml.,  Goal UF _0.5 L _ over _3_ Hours     Pt is seen and examined on dialysis. No symptoms. Hemodynamics stable. Tolerating dialysis and ultrafiltration.  Pre Laboratory values personally reviewed by me.  Dialysis adjusted appropriately based on current values.  Will continue the current medical management.  Next hemodialysis as scheduled.  Discussed with nursing, primary care team.     D/W Vascular Surgery,

## 2022-06-04 NOTE — BRIEF OPERATIVE NOTE - NSICDXBRIEFPROCEDURE_GEN_ALL_CORE_FT
PROCEDURES:  Above knee amputation 04-Jun-2022 00:18:49  Enzo Molina  Placement of venous hemodialysis catheter 04-Jun-2022 00:20:59  Enzo Molina  Placement of venous hemodialysis catheter 04-Jun-2022 00:21:05  Enzo Molina  
PROCEDURES:  Ligation of artery of extremity 31-May-2022 09:30:50 Left femoral to anterior tibial bypass graaft (cryovein) Basilio Moreno

## 2022-06-05 NOTE — PROGRESS NOTE ADULT - ASSESSMENT
88yo M POD 2 lt aka. pt tolerated procedure well. pt tolerated dialysis via permacath    Plan:   - pt can continue heparin/ diet. pt should continue Zosyn. Dressing with xeroform, gauze, abd, kerlix, aace, ioban. take down in three days. i.e on Tuesday  - Shiley removed  -Trend H/H, transfuse PRN  -Rest of care per SICU team  -Vascular surgery following

## 2022-06-05 NOTE — CHART NOTE - NSCHARTNOTEFT_GEN_A_CORE
I was informed by the nurse that she was next to the patient when he was drinking water and  had an episode of choking, she states she gave him  his medications ( pills) with apple sauce without any complication.  Examination  Patient saturating  87% currently with O2  nasal can    Auscultation  bilateral bi-basal hypoventilation   I instruct the patient to cough   Plan   Chest X ray stat   NPO   Speech and swallow evaluation STAT

## 2022-06-05 NOTE — SWALLOW BEDSIDE ASSESSMENT ADULT - SLP GENERAL OBSERVATIONS
Pt received sleeping in bed arousable with cues, Ox3 +lethargic, +2LO2NC SpO2 >92%, Suspect reduced hearing acuity, 0/10 pain pre& post, pt left w/ intermittent coughing called RN to inform/ waited for RN to arrive prior to departure
Pt received & seen seated upright in bed, awake/alert, 02 NC (sats: 100%), encouragement to accept PO, RN Nisha, spouse, daughter & granddaughter present, 0/10 pain

## 2022-06-05 NOTE — SWALLOW BEDSIDE ASSESSMENT ADULT - SWALLOW EVAL: RECOMMENDED DIET
Puree with no liquids; consideration for alternative means of hydration as per pt/ family wishes
soft/bite-sized solids, thin fluids

## 2022-06-05 NOTE — PHYSICAL THERAPY INITIAL EVALUATION ADULT - ADDITIONAL COMMENTS
Pt poor historian, as per prior admission: Pt lives with spouse in a house with 3 [address] ramp. Pt has and w/c. Pt requires assist for functional mobility, ADLs, and IADLs. Pt has family available to assist as needed.

## 2022-06-05 NOTE — SWALLOW BEDSIDE ASSESSMENT ADULT - COMMENTS
I was informed by the nurse that she was next to the patient when he was drinking water and  had an episode of choking, she states she gave him  his medications ( pills) with apple sauce without any complication.  Examination  Patient saturating  87% currently with O2  nasal can    Auscultation  bilateral bi-basal hypoventilation   I instruct the patient to cough   Plan   Chest X ray stat   NPO   Speech and swallow evaluation STAT.    Pt known to this dept, last seen this admission on 6/1 w/ rx for soft & bite-sized with thin liquids. Baseline: "Regular cut-up solids, thin fluids as per family"   New MD order due to pt coughing with thin liquids, pt now NPO. Pt w/ prolonged cough >10 min requiring suctioning of secretions via Yankauer. Pt with audible breath sounds, SpO2 remained >92%. Moderately thick not assessed given continued cough.

## 2022-06-05 NOTE — SWALLOW BEDSIDE ASSESSMENT ADULT - SWALLOW EVAL: DIAGNOSIS
Mild oral dysphagia for easy to chew solids, impacted by dentition, oral stage WFL for puree, soft/bite-sized solids & thin fluids. Pharyngeal stage of swallow clinically unremarkable for assessed PO with no overt s/s aspiration observed
Oral stage notable for increased oral transit time with soft & bite-sized & suspect posterior loss with thin & mildly thick liquids. Oral stage WFL for puree. Suspect pharyngeal dysphagia for soft & bite-sized due to +throat clear, with thin liquids due to +throat clear & delayed cough & with mildly thick liquids due to w/ prolonged cough >10 min requiring suctioning of secretions via Yankauer. Pt with audible breath sounds, SpO2 remained >92%.

## 2022-06-05 NOTE — SWALLOW BEDSIDE ASSESSMENT ADULT - PHARYNGEAL PHASE
Within functional limits Throat clear post oral intake Throat clear post oral intake/Delayed cough post oral intake Cough post oral intake/Throat clear post oral intake

## 2022-06-05 NOTE — PROGRESS NOTE ADULT - ASSESSMENT
HD in AM,     Maintain Hgb., 10-11.5 gms.,    Hold AVF Revision for Now,     Nutrition & Improve Mobility,     R - TDC Site Dry,     D/W Dr. Giron & Daughter,

## 2022-06-05 NOTE — SWALLOW BEDSIDE ASSESSMENT ADULT - SLP PERTINENT HISTORY OF CURRENT PROBLEM
Difficulty chewing bagel noted earlier this date by RN, with some coughing post intake of therapeutic nutritional drink
As per charting, "A/p: 89 yo Male with PMH of ESRD on HD, GI bleed secondary to AVMs, CAD, hypertension, PPM, HFpEF, hypertension, hyperlipidemia, CAD, atrial fibrillation, LLE DVT, aortic stenosis s/p TAVR, PAD s/p recent L fem to AT bypass with cryovein c/b acute graft rethrombosis s/p thrombectomy presenting with hemorrhagic shock s/p rupture of cryovein Fem-AT bypass s/p ligation of graft. Hemorrhagic shock resolved, now s/p AKA, placement of RIJ dialysis catheter."

## 2022-06-05 NOTE — PROGRESS NOTE ADULT - SUBJECTIVE AND OBJECTIVE BOX
Subjective:    Patient s/p AKA and placement of R IJ permacatheter.  Case was uncomplicated. Patient came back extubated, off pressors.  Reporting some discomfort, given pain medication with improvement.  Vitals remain stable.     MEDICATIONS  (STANDING):  acetaminophen     Tablet .. 650 milliGRAM(s) Oral every 6 hours  aspirin  chewable 81 milliGRAM(s) Oral daily  chlorhexidine 2% Cloths 1 Application(s) Topical <User Schedule>  dextrose 5%. 1000 milliLiter(s) (50 mL/Hr) IV Continuous <Continuous>  dextrose 5%. 1000 milliLiter(s) (100 mL/Hr) IV Continuous <Continuous>  dextrose 50% Injectable 25 Gram(s) IV Push once  dextrose 50% Injectable 12.5 Gram(s) IV Push once  dextrose 50% Injectable 25 Gram(s) IV Push once  glucagon  Injectable 1 milliGRAM(s) IntraMuscular once  heparin   Injectable 5000 Unit(s) SubCutaneous every 8 hours  influenza  Vaccine (HIGH DOSE) 0.7 milliLiter(s) IntraMuscular once  isosorbide   mononitrate ER Tablet (IMDUR) 30 milliGRAM(s) Oral daily  melatonin 3 milliGRAM(s) Oral at bedtime  Nephro-pito 1 Tablet(s) Oral daily  NIFEdipine XL 90 milliGRAM(s) Oral daily  pantoprazole    Tablet 40 milliGRAM(s) Oral every 12 hours  piperacillin/tazobactam IVPB.. 3.375 Gram(s) IV Intermittent every 12 hours  polyethylene glycol 3350 17 Gram(s) Oral every 12 hours  senna 2 Tablet(s) Oral at bedtime  traZODone 100 milliGRAM(s) Oral at bedtime    MEDICATIONS  (PRN):  dextrose Oral Gel 15 Gram(s) Oral once PRN Blood Glucose LESS THAN 70 milliGRAM(s)/deciliter  HYDROmorphone  Injectable 0.25 milliGRAM(s) IV Push every 3 hours PRN Mild Pain (1 - 3)  HYDROmorphone  Injectable 0.5 milliGRAM(s) IV Push every 3 hours PRN Breakthrough pain  HYDROmorphone  Injectable 0.5 milliGRAM(s) IV Push every 3 hours PRN Moderate Pain (4 - 6)  sodium chloride 0.9% lock flush 10 milliLiter(s) IV Push every 1 hour PRN Pre/post blood products, medications, blood draw, and to maintain line patency      Vital Signs Last 24 Hrs  T(C): 36.4 (2022 00:04), Max: 37.2 (2022 15:47)  T(F): 97.6 (2022 00:04), Max: 98.9 (2022 15:47)  HR: 61 (2022 00:04) (59 - 83)  BP: 115/47 (2022 00:04) (110/53 - 130/55)  BP(mean): 80 (2022 13:00) (67 - 80)  RR: 18 (2022 00:04) (14 - 21)  SpO2: 98% (2022 00:04) (98% - 100%)        --------------------------------------------------------  IN:    IV PiggyBack: 100 mL  Total IN: 100 mL    OUT:    Incontinent per Condom Catheter (mL): 50 mL  Total OUT: 50 mL    Total NET: 50 mL        --------------------------------------------------------  IN:    IV PiggyBack: 100 mL    Oral Fluid: 450 mL  Total IN: 550 mL    OUT:    Incontinent per Condom Catheter (mL): 25 mL    Other (mL): 1000 mL  Total OUT: 1025 mL    Total NET: -475 mL          Physical Exam:   GEN: patient resting comfortably in bed, in no acute distress  RESP: respirations are unlabored, no accessory muscle use, no conversational dyspnea  CVS: RRR  GI: Abdomen soft, non-tender, non-distended, no rebound tenderness / guarding  lt lower extremity s/p lt AKA dressing in place, dressing is cdi      LABS:                        8.3    9.08  )-----------( 164      ( 2022 04:34 )             25.8         137  |  96<L>  |  57.8<H>  ----------------------------<  203<H>  5.1   |  26.0  |  4.23<H>    Ca    8.9      2022 04:34  Phos  4.2     06-  Mg     2.1     06-    TPro  6.3<L>  /  Alb  3.4  /  TBili  0.7  /  DBili  x   /  AST  61<H>  /  ALT  15  /  AlkPhos  255<H>  03    PT/INR - ( 2022 15:44 )   PT: 13.9 sec;   INR: 1.20 ratio         PTT - ( 2022 15:44 )  PTT:33.7 sec  Urinalysis Basic - ( 2022 18:57 )    Color: Yellow / Appearance: Clear / S.010 / pH: x  Gluc: x / Ketone: Negative  / Bili: Small / Urobili: Negative mg/dL   Blood: x / Protein: 100 / Nitrite: Negative   Leuk Esterase: Trace / RBC: 0-2 /HPF / WBC 11-25 /HPF   Sq Epi: x / Non Sq Epi: Occasional / Bacteria: Occasional

## 2022-06-05 NOTE — SWALLOW BEDSIDE ASSESSMENT ADULT - SWALLOW EVAL: RECOMMENDED FEEDING/EATING TECHNIQUES
small bites/crush medication (when feasible)/maintain upright posture during/after eating for 30 mins/oral hygiene/position upright (90 degrees)
allow for swallow between intakes/crush medication (when feasible)/maintain upright posture during/after eating for 30 mins/oral hygiene/position upright (90 degrees)/small sips/bites

## 2022-06-05 NOTE — PROGRESS NOTE ADULT - SUBJECTIVE AND OBJECTIVE BOX
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 Patient came back extubated, off pressors.      Reporting some discomfort, given pain medication with improvement.      Vitals remain stable. , h/h stable. No leukocytosis, afebrile.     Demented, Intermittently Screaming,     MEDICATIONS  (STANDING):    acetaminophen     Tablet .. 650 milliGRAM(s) Oral every 6 hours  chlorhexidine 2% Cloths 1 Application(s) Topical <User Schedule>  dextrose 5%. 1000 milliLiter(s) (50 mL/Hr) IV Continuous <Continuous>  dextrose 5%. 1000 milliLiter(s) (100 mL/Hr) IV Continuous <Continuous>  dextrose 50% Injectable 25 Gram(s) IV Push once  dextrose 50% Injectable 12.5 Gram(s) IV Push once  dextrose 50% Injectable 25 Gram(s) IV Push once  glucagon  Injectable 1 milliGRAM(s) IntraMuscular once  heparin   Injectable 5000 Unit(s) SubCutaneous every 8 hours  influenza  Vaccine (HIGH DOSE) 0.7 milliLiter(s) IntraMuscular once  isosorbide   mononitrate ER Tablet (IMDUR) 30 milliGRAM(s) Oral daily  melatonin 3 milliGRAM(s) Oral at bedtime  Nephro-pito 1 Tablet(s) Oral daily  NIFEdipine XL 90 milliGRAM(s) Oral daily  pantoprazole    Tablet 40 milliGRAM(s) Oral every 12 hours  piperacillin/tazobactam IVPB.. 3.375 Gram(s) IV Intermittent every 12 hours  polyethylene glycol 3350 17 Gram(s) Oral every 12 hours  senna 2 Tablet(s) Oral at bedtime  sevelamer carbonate 800 milliGRAM(s) Oral three times a day with meals  tamsulosin 0.4 milliGRAM(s) Oral at bedtime  traZODone 100 milliGRAM(s) Oral at bedtime    MEDICATIONS  (PRN):  dextrose Oral Gel 15 Gram(s) Oral once PRN Blood Glucose LESS THAN 70 milliGRAM(s)/deciliter  HYDROmorphone  Injectable 0.25 milliGRAM(s) IV Push every 3 hours PRN Mild Pain (1 - 3)  HYDROmorphone  Injectable 0.5 milliGRAM(s) IV Push every 3 hours PRN Breakthrough pain  HYDROmorphone  Injectable 0.5 milliGRAM(s) IV Push every 3 hours PRN Moderate Pain (4 - 6)  sodium chloride 0.9% lock flush 10 milliLiter(s) IV Push every 1 hour PRN Pre/post blood products, medications, blood draw, and to maintain line patency    Vital Signs Last 24 Hrs  T(C): 36.7 (2022 00:00), Max: 38.3 (2022 15:09)  T(F): 98 (2022 00:00), Max: 100.9 (2022 15:09)  HR: 70 (2022 02:00) (55 - 74)  BP: 113/52 (2022 02:00) (96/48 - 122/50)  BP(mean): 71 (2022 02:00) (61 - 90)  RR: 17 (2022 02:00) (16 - 26)  SpO2: 100% (2022 02:00) (90% - 100%)      --------------------------------------------------------  IN:  Total IN: 0 mL    OUT:    Other (mL): 1500 mL  Total OUT: 1500 mL    Total NET: -1500 mL      --------------------------------------------------------  IN:    IV PiggyBack: 25 mL  Total IN: 25 mL    OUT:  Total OUT: 0 mL    Total NET: 25 mL    Physical Exam:   GEN: patient resting comfortably in bed, in no acute distress  RESP: respirations are unlabored, no accessory muscle use, no conversational dyspnea  CVS: RRR  GI: Abdomen soft, non-tender, non-distended, no rebound tenderness / guarding  lt lower extremity s/p lt AKA dressing in place, dressing is cdi    LABS:                        8.3    6.70  )-----------( 169      ( 2022 00:38 )             25.9     06-04    139  |  98  |  56.4<H>  ----------------------------<  161<H>  4.9   |  26.0  |  4.22<H>    Ca    8.4<L>      2022 00:38  Phos  3.6     06-  Mg     2.1     06-04    TPro  6.3<L>  /  Alb  3.4  /  TBili  0.7  /  DBili  x   /  AST  61<H>  /  ALT  15  /  AlkPhos  255<H>  06-    PT/INR - ( 2022 15:44 )   PT: 13.9 sec;   INR: 1.20 ratio         PTT - ( 2022 15:44 )  PTT:33.7 sec  Urinalysis Basic - ( 2022 18:57 )    Color: Yellow / Appearance: Clear / S.010 / pH: x  Gluc: x / Ketone: Negative  / Bili: Small / Urobili: Negative mg/dL   Blood: x / Protein: 100 / Nitrite: Negative   Leuk Esterase: Trace / RBC: 0-2 /HPF / WBC 11-25 /HPF   Sq Epi: x / Non Sq Epi: Occasional / Bacteria: Occasional.    Xray Chest 1 View AP/PA. (22 @ 15:55)     ACC: [id number] EXAM:  XR CHEST AP OR PA 1V                          PROCEDURE DATE:  2022        INTERPRETATION:  Exam:XR CHEST    clinical history:Fever    Increased bibasilar airspace disease. Pneumonia not excluded. No   pneumothorax.    IMPRESSION: Increasing bilateral airspace disease as above    TTE Echo Complete w/ Contrast w/ Doppler (22 @ 10:52) >    Summary:   1. Left ventricular ejection fraction, by visual estimation, is 70 to   75%.   2. Hyperdynamic global left ventricular systolic function.   3. Severely enlarged left atrium.   4. Abnormal septal motion consistent with post-operative status.   5. Mildly increased LV wall thickness.   6. Mid cavity obliteration.   7. Moderately enlarged right atrium.   8. Mild-moderate tricuspid regurgitation.   9. Estimated pulmonary artery systolic pressure is 59.4 mmHg assuming a   right atrial pressure of 3 mmHg, which is consistent with moderate   pulmonary hypertension.  10. Mild to moderate mitral annular calcification.  11. Moderate mitral valve regurgitation.  12. Thickening of the anterior and posterior mitral valve leaflets.  13. 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 This appears normal functioning   with peak velocity of 2.61 m/s with no evidence of para-valvular or   intravalvular regurgitation.  14. Compared to prior imaging on 2022, the left ventricular function   remains unchanged.    MD Perlita Electronically signed on 2022 at 7:07:01 PM      12 Lead ECG (22 @ 15:20)    Ventricular Rate 53 BPM    Atrial Rate 66 BPM    QRS Duration 158 ms    Q-T Interval 492 ms    QTC Calculation(Bazett) 461 ms    P Axis 46 degrees    R Axis 259 degrees    T Axis 88 degrees    Diagnosis Line Ventricular-paced rhythm    Confirmed bySHAR VICTORIA ([id number]) on 2022 10:56:10 PM    88yo M POD 1 lt aka. pt tolerated procedure well    Plan:  -On heparin/ diet. pt should continue Zosyn.           Per VS : Dressing with xeroform, gauze, abd, kerlix, aace, ioban. take down in three days. i.e on Tuesday  -Trend H/H, transfuse PRN  -Rest of care per VS team      Patient i tolerated  the procedure well.   T(C): 36.7 (22 @ 07:34), Max: 38.3 (22 @ 15:09)  HR: 72 (22 @ 09:00) (55 - 74)  BP: 120/55 (22 @ 09:00) (96/48 - 122/58)  Continue dialysis:  On Monday,   Dialyzer: Revaclear 300          QB:  400 ml.,       QD: 500ml.,     S/P UF _0.5 L _ over _3_ Hours     Pt is seen and examined . No symptoms. Hemodynamics stable. Tolerated  dialysis and ultrafiltration.  Pre Laboratory values personally reviewed by me.  Dialysis adjusted appropriately based on current values.  Will continue the current medical management.  Next hemodialysis as scheduled.  Discussed with nursing, primary care team.

## 2022-06-06 NOTE — PROGRESS NOTE ADULT - SUBJECTIVE AND OBJECTIVE BOX
OVERNIGHT EVENTS:  F/U Note  No new events overnight  Patient seen in Dialysis suite, having just compleeted his treatment  Tolerated well, BP as low as 101 systolic  Alert and verbal  Staff reporting he is moaning and experiencing intermittent pain  Sharp stabbing burnng throbbing  Asked if it is like the same pain he was having before surgery??  He replied yes but it is worse-comes and goes  LAKA amputation stump ace wrapped and elevated  He is hungry, Puree no liquid diet limiting-he will be advanced some time today  No fever or chills, No CP, palpitations N/V/D SOB     Present Symptoms:     Dyspnea: none  Nausea/Vomiting:  No  Anxiety:  Yes   Depression:  No  Fatigue: Yes AWAKE AND VEBAL  Loss of appetite: No  Constipation:     Pain: lL AKA stump mixed neuropathic, nociceptive pain            Character-            Duration-            Effect-            Factors-            Frequency-            Location-L AKA and LLE phantiom pain            Severity-moderate to severe    Pain AD Score:  7/10    Review of Systems: Reviewed                     All others negative    MEDICATIONS  (STANDING):  acetaminophen     Tablet .. 650 milliGRAM(s) Oral every 6 hours  acetaminophen   IVPB .. 1000 milliGRAM(s) IV Intermittent once  aspirin  chewable 81 milliGRAM(s) Oral daily  chlorhexidine 2% Cloths 1 Application(s) Topical <User Schedule>  dextrose 5%. 1000 milliLiter(s) (50 mL/Hr) IV Continuous <Continuous>  dextrose 5%. 1000 milliLiter(s) (100 mL/Hr) IV Continuous <Continuous>  dextrose 50% Injectable 25 Gram(s) IV Push once  dextrose 50% Injectable 12.5 Gram(s) IV Push once  dextrose 50% Injectable 25 Gram(s) IV Push once  fentaNYL   Patch  12 MICROgram(s)/Hr 1 Patch Transdermal every 72 hours  glucagon  Injectable 1 milliGRAM(s) IntraMuscular once  heparin   Injectable 5000 Unit(s) SubCutaneous every 8 hours  influenza  Vaccine (HIGH DOSE) 0.7 milliLiter(s) IntraMuscular once  isosorbide   mononitrate ER Tablet (IMDUR) 30 milliGRAM(s) Oral daily  melatonin 3 milliGRAM(s) Oral at bedtime  methadone    Tablet 2.5 milliGRAM(s) Oral once  Nephro-pito 1 Tablet(s) Oral daily  NIFEdipine XL 90 milliGRAM(s) Oral daily  pantoprazole    Tablet 40 milliGRAM(s) Oral every 12 hours  piperacillin/tazobactam IVPB.. 3.375 Gram(s) IV Intermittent every 12 hours  polyethylene glycol 3350 17 Gram(s) Oral every 12 hours  senna 2 Tablet(s) Oral at bedtime  traZODone 100 milliGRAM(s) Oral at bedtime    MEDICATIONS  (PRN):  dextrose Oral Gel 15 Gram(s) Oral once PRN Blood Glucose LESS THAN 70 milliGRAM(s)/deciliter  HYDROmorphone  Injectable 0.5 milliGRAM(s) IV Push every 3 hours PRN Breakthrough pain  HYDROmorphone  Injectable 0.25 milliGRAM(s) IV Push every 3 hours PRN Mild Pain (1 - 3)  HYDROmorphone  Injectable 0.5 milliGRAM(s) IV Push every 3 hours PRN Moderate Pain (4 - 6)  sodium chloride 0.9% lock flush 10 milliLiter(s) IV Push every 1 hour PRN Pre/post blood products, medications, blood draw, and to maintain line patency      PHYSICAL EXAM:    Vital Signs Last 24 Hrs  T(C): 36.9 (06 Jun 2022 10:42), Max: 37.3 (05 Jun 2022 15:00)  T(F): 98.4 (06 Jun 2022 10:42), Max: 99.1 (05 Jun 2022 15:00)  HR: 75 (06 Jun 2022 10:42) (59 - 75)  BP: 101/51 (06 Jun 2022 10:42) (101/51 - 122/55)  BP(mean): 54 (06 Jun 2022 07:25) (54 - 54)  RR: 18 (06 Jun 2022 10:42) (18 - 19)  SpO2: 100% (06 Jun 2022 10:42) (90% - 100%)    General: alert  oriented x __3__                   cachexia  nonverbal  coma    Karnofsky: 30%    HEENT:  dry mouth      Lungs: comfortable     CV: normal      GI: normal                 constipation  last BM:     :   oliguria/anuria     MSK:   weakness  Post L AKA            bedbound/wheelchair bound    Skin:   no rash    LABS:                          9.1    6.79  )-----------( 162      ( 06 Jun 2022 07:00 )             28.6     06-06    139  |  98  |  46.6<H>  ----------------------------<  107<H>  5.0   |  27.0  |  3.98<H>    Ca    8.5<L>      06 Jun 2022 07:00  Phos  4.1     06-06  Mg     2.1     06-06    I&O's Summary    06 Jun 2022 07:01  -  06 Jun 2022 11:34  --------------------------------------------------------  IN: 0 mL / OUT: 1000 mL / NET: -1000 mL    RADIOLOGY & ADDITIONAL STUDIES:  < from: CT Abdomen and Pelvis w/ IV Cont (05.09.22 @ 10:11) >  IMPRESSION:  Evidence of active bleeding at the level of anal canal as described above.    Atrophic kidneys.  Suspect CHF.    Additional findings as above.    Findings were discussed with DR. DOWNING [phone] 5/9/2022 11:55 AM by   Dr. Garner with read back confirmation.    --- End of Report ---      ADVANCE DIRECTIVES/TREATMENT PREFERENCES:  DNR YES   Completed on:                     MOLST  YES    Completed on:  Living Will   NO   Completed on:

## 2022-06-06 NOTE — GOALS OF CARE CONVERSATION - ADVANCED CARE PLANNING - WHAT MATTERS MOST
Patient-looking forward to discharge    Family minimal pain medication, goal to avoid oversedation and cause change in mental status  To be able to rehab- cooperate with PT

## 2022-06-06 NOTE — PROGRESS NOTE ADULT - NSPROGADDITIONALINFOA_GEN_ALL_CORE
Total Time Spent___60_ minutes  This includes chart review, patient assessment, discussion and collaboration with interdisciplinary team members, ACP planning    COUNSELING:  Telephone meeting to discuss Advanced Care Planning - Time Spent _18_____Minutes.      Thank you for the opportunity to assist with the care of this patient.   [facility] Palliative Medicine Consult Service [phone]. Total Time Spent___60_ minutes  This includes chart review, patient assessment, discussion and collaboration with interdisciplinary team members, ACP planning    COUNSELING:  Telephone meeting to discuss Advanced Care Planning - Time Spent _20_____Minutes.      Thank you for the opportunity to assist with the care of this patient.   [facility] Palliative Medicine Consult Service [phone].

## 2022-06-06 NOTE — GOALS OF CARE CONVERSATION - ADVANCED CARE PLANNING - TREATMENT GUIDELINE COMMENT
Communicate with daughter Vanessa, inform her of changes in his condition, planof care, any new medications being considered

## 2022-06-06 NOTE — GOALS OF CARE CONVERSATION - ADVANCED CARE PLANNING - CONVERSATION DETAILS
I called Vanessa to discuss her Father's Pain, as he was not getting IV Dilaudid for pain-causing drop in already hypotensive BP.  She immediately got upset, does not want her Father medicated for pain with anything like Fentnayl of Methadone,  he is sensitive and anyway I was with him for hours yesterday, he had a good day and he told me he was doing fine.  She made sure I understood he was not a Hospice Patient, goal is to get him out of the hospital ASAP, avoiding any further complications.    Patient is not a reliable self , especially when his daughter asks How is the pain, do you have pain?    He is not moving in his bed, Moaning intermittently, in Dialysis suite admits he has been having pain intermittently.  Crying out when turned and positioned. Unable to get up today with PT-no pain medication onboard except for Tylenol.    I reviewed rationale for analgesics I had ordered, specifically  indicated for his type of pain;  and that methadone would just be ordered for one dose and he would be reassessed. If tolerated and effective be reordered    After a long discussion, she reluctantly agreed with much reassurance and support.    Vanessa called me in a few hours, still very concerned about the analgesic plan, asking if we could just start one medication  today, then see how he does. I listened and reassured, strongly advocating for her Father's pain mgmt plan.    I got a call letting me know Patient never was given these  pain medications, it was 1600hrs at this time.  Methadone 2.5mg PO reordered x 1 now  Will change date to start Fentanyl 12 mcg/hr patch until tomorrow morning

## 2022-06-06 NOTE — PROGRESS NOTE ADULT - SUBJECTIVE AND OBJECTIVE BOX
HPI/OVERNIGHT EVENTS: Patient seen and examined at bedside this AM. No overnight events. No complaints. Denies fever, chills, nausea, vomiting, chest pain, SOB, dizziness, abd pain or any other concerning symptoms.    Vital Signs Last 24 Hrs  T(C): 36.6 (06 Jun 2022 04:56), Max: 37.3 (05 Jun 2022 15:00)  T(F): 97.9 (06 Jun 2022 04:56), Max: 99.1 (05 Jun 2022 15:00)  HR: 74 (06 Jun 2022 04:56) (59 - 74)  BP: 122/55 (06 Jun 2022 04:56) (98/46 - 122/55)  BP(mean): --  RR: 18 (06 Jun 2022 04:56) (18 - 19)  SpO2: 90% (06 Jun 2022 04:56) (90% - 100%)    I&O's Detail    04 Jun 2022 07:01  -  05 Jun 2022 07:00  --------------------------------------------------------  IN:    IV PiggyBack: 100 mL    Oral Fluid: 450 mL  Total IN: 550 mL    OUT:    Incontinent per Condom Catheter (mL): 75 mL    Other (mL): 1000 mL  Total OUT: 1075 mL    Total NET: -525 mL        Physical Exam:   GEN: patient resting comfortably in bed, in no acute distress  RESP: respirations are unlabored, no accessory muscle use, no conversational dyspnea  CVS: RRR  GI: Abdomen soft, non-tender, non-distended, no rebound tenderness / guarding  lt lower extremity s/p lt AKA dressing in place, dressing is cdi    LABS:                        9.7    8.81  )-----------( 187      ( 05 Jun 2022 07:14 )             30.3     06-05    138  |  99  |  36.0<H>  ----------------------------<  157<H>  4.9   |  27.0  |  3.10<H>    Ca    8.8      05 Jun 2022 07:14  Phos  3.4     06-05  Mg     2.0     06-05            MEDICATIONS  (STANDING):  acetaminophen     Tablet .. 650 milliGRAM(s) Oral every 6 hours  aspirin  chewable 81 milliGRAM(s) Oral daily  chlorhexidine 2% Cloths 1 Application(s) Topical <User Schedule>  dextrose 5%. 1000 milliLiter(s) (50 mL/Hr) IV Continuous <Continuous>  dextrose 5%. 1000 milliLiter(s) (100 mL/Hr) IV Continuous <Continuous>  dextrose 50% Injectable 25 Gram(s) IV Push once  dextrose 50% Injectable 12.5 Gram(s) IV Push once  dextrose 50% Injectable 25 Gram(s) IV Push once  glucagon  Injectable 1 milliGRAM(s) IntraMuscular once  heparin   Injectable 5000 Unit(s) SubCutaneous every 8 hours  influenza  Vaccine (HIGH DOSE) 0.7 milliLiter(s) IntraMuscular once  isosorbide   mononitrate ER Tablet (IMDUR) 30 milliGRAM(s) Oral daily  melatonin 3 milliGRAM(s) Oral at bedtime  Nephro-pito 1 Tablet(s) Oral daily  NIFEdipine XL 90 milliGRAM(s) Oral daily  pantoprazole    Tablet 40 milliGRAM(s) Oral every 12 hours  piperacillin/tazobactam IVPB.. 3.375 Gram(s) IV Intermittent every 12 hours  polyethylene glycol 3350 17 Gram(s) Oral every 12 hours  senna 2 Tablet(s) Oral at bedtime  traZODone 100 milliGRAM(s) Oral at bedtime    MEDICATIONS  (PRN):  dextrose Oral Gel 15 Gram(s) Oral once PRN Blood Glucose LESS THAN 70 milliGRAM(s)/deciliter  HYDROmorphone  Injectable 0.5 milliGRAM(s) IV Push every 3 hours PRN Breakthrough pain  HYDROmorphone  Injectable 0.5 milliGRAM(s) IV Push every 3 hours PRN Moderate Pain (4 - 6)  HYDROmorphone  Injectable 0.25 milliGRAM(s) IV Push every 3 hours PRN Mild Pain (1 - 3)  sodium chloride 0.9% lock flush 10 milliLiter(s) IV Push every 1 hour PRN Pre/post blood products, medications, blood draw, and to maintain line patency

## 2022-06-06 NOTE — PROGRESS NOTE ADULT - SUBJECTIVE AND OBJECTIVE BOX
Patient was seen and evaluated on dialysis.   Patient is tolerating the procedure well.   T(C): 36.9 (06-06-22 @ 10:42), Max: 37.3 (06-05-22 @ 15:00)  HR: 75 (06-06-22 @ 10:42) (59 - 75)  BP: 101/51 (06-06-22 @ 10:42) (101/51 - 122/55)  Continue dialysis:   Dialyzer:  revaclear 300 QB:  400 ml QD: 500ml.,  Goal UF 1 kg over 3 Hours

## 2022-06-06 NOTE — CHART NOTE - NSCHARTNOTEFT_GEN_A_CORE
Source: Patient [ ]  Family [ ]   other [x ] off of the unit  88M with  ESRD on HD, anemia, CHF with preserved EF, HTN, CAD, AFIB, PVD s/p recent bypass, LLE DVT, recently admitted with gastrointestinal bleeding, here with hemorrhagic shock related to graft bleeding s/p ligation of bleeding CryoVein fem-AT bypass, with AV graft thrombosis.   Pt DNR/ DNI- folloed by palliative care    Current Diet: Diet, DASH/TLC:   Sodium & Cholesterol Restricted  Consistent Carbohydrate {No Snacks} (CSTCHO)  Pureed (PUREED)  No Liquids (NOLIQUIDS) (06-05-22 @ 13:55)      Patient reports [ ] nausea  [ ] vomiting [ ] diarrhea [ ] constipation  [ ]chewing problems [ ] swallowing issues  [ ] other:     PO intake:  < 50% [x ]   50-75%  [ ]   %  [ ]  other :    Source for PO intake [ x] Patient [ ] family [ ] chart [ ] staff [ ] other      Current Weight:   6/6 64 kg  6/4 65.3 kg  6/1 70 kg  5/31 66.2 kg  % Weight Change     Pertinent Medications: MEDICATIONS  (STANDING):  acetaminophen     Tablet .. 650 milliGRAM(s) Oral every 6 hours  aspirin  chewable 81 milliGRAM(s) Oral daily  chlorhexidine 2% Cloths 1 Application(s) Topical <User Schedule>  dextrose 5%. 1000 milliLiter(s) (50 mL/Hr) IV Continuous <Continuous>  dextrose 5%. 1000 milliLiter(s) (100 mL/Hr) IV Continuous <Continuous>  dextrose 50% Injectable 25 Gram(s) IV Push once  dextrose 50% Injectable 12.5 Gram(s) IV Push once  dextrose 50% Injectable 25 Gram(s) IV Push once  fentaNYL   Patch  12 MICROgram(s)/Hr 1 Patch Transdermal every 72 hours  glucagon  Injectable 1 milliGRAM(s) IntraMuscular once  heparin   Injectable 5000 Unit(s) SubCutaneous every 8 hours  influenza  Vaccine (HIGH DOSE) 0.7 milliLiter(s) IntraMuscular once  isosorbide   mononitrate ER Tablet (IMDUR) 30 milliGRAM(s) Oral daily  melatonin 3 milliGRAM(s) Oral at bedtime  Nephro-pito 1 Tablet(s) Oral daily  NIFEdipine XL 90 milliGRAM(s) Oral daily  pantoprazole    Tablet 40 milliGRAM(s) Oral every 12 hours  piperacillin/tazobactam IVPB.. 3.375 Gram(s) IV Intermittent every 12 hours  polyethylene glycol 3350 17 Gram(s) Oral every 12 hours  senna 2 Tablet(s) Oral at bedtime  traZODone 100 milliGRAM(s) Oral at bedtime    MEDICATIONS  (PRN):  dextrose Oral Gel 15 Gram(s) Oral once PRN Blood Glucose LESS THAN 70 milliGRAM(s)/deciliter  HYDROmorphone  Injectable 0.5 milliGRAM(s) IV Push every 3 hours PRN Breakthrough pain  HYDROmorphone  Injectable 0.25 milliGRAM(s) IV Push every 3 hours PRN Mild Pain (1 - 3)  HYDROmorphone  Injectable 0.5 milliGRAM(s) IV Push every 3 hours PRN Moderate Pain (4 - 6)  sodium chloride 0.9% lock flush 10 milliLiter(s) IV Push every 1 hour PRN Pre/post blood products, medications, blood draw, and to maintain line patency    Pertinent Labs: CBC Full  -  ( 06 Jun 2022 07:00 )  WBC Count : 6.79 K/uL  RBC Count : 3.01 M/uL  Hemoglobin : 9.1 g/dL  Hematocrit : 28.6 %  Platelet Count - Automated : 162 K/uL  Mean Cell Volume : 95.0 fl  Mean Cell Hemoglobin : 30.2 pg  Mean Cell Hemoglobin Concentration : 31.8 gm/dL  Auto Neutrophil # : 5.47 K/uL  Auto Lymphocyte # : 0.49 K/uL  Auto Monocyte # : 0.62 K/uL  Auto Eosinophil # : 0.09 K/uL  Auto Basophil # : 0.07 K/uL  Auto Neutrophil % : 80.7 %  Auto Lymphocyte % : 7.2 %  Auto Monocyte % : 9.1 %  Auto Eosinophil % : 1.3 %  Auto Basophil % : 1.0 %    06-06 Na139 mmol/L Glu 107 mg/dL<H> K+ 5.0 mmol/L Cr  3.98 mg/dL<H> BUN 46.6 mg/dL<H> Phos 4.1 mg/dL Alb n/a   PAB n/a             Skin: sx incision     Nutrition focused physical exam conducted - found signs of malnutrition [ ]absent [x ]present    Subcutaneous fat loss: [ ] Orbital fat pads region, [ ]Buccal fat region, [ ]Triceps region,  [ ]Ribs region    Muscle wasting: [x ]Temples region, [x ]Clavicle region, [ ]Shoulder region, [ ]Scapula region, [ ]Interosseous region,  [ ]thigh region, [ ]Calf region    Estimated Needs:   [ x] no change since previous assessment  [ ] recalculated:     Current Nutrition Diagnosis: Pt presents at risk secondary to Malnutrition., Severe (chronic)  Related to inability to meet sufficient protein energy needs in setting of advanced age w/ ESRD on HD, admitted w/ hemorrhagic shock  as evidence by meeting less then 75% est needs > 1mo, physical signs of severe muscle/fat loss      Recommendations:   1) Add magic cup TID  2) Encourage PO intake assist with meals  3) Daily weight  4) Continue Nephrovite    Monitoring and Evaluation:   [ ] PO intake [ ] Tolerance to diet prescription [X] Weights  [X] Follow up per protocol [X] Labs:

## 2022-06-06 NOTE — PROGRESS NOTE ADULT - ASSESSMENT
Assessment /Plan  88M with  ESRD on HD, anemia, CHF with preserved EF, HTN, CAD, AFIB, PVD s/p recent bypass, LLE DVT, recently admitted with gastrointestinal bleeding, here with hemorrhagic shock related to graft bleeding s/p ligation of bleeding CryoVein fem-AT bypass, with AV graft thrombosis.     S/P L AKA POD #2  PAD, failed femoral tibial bypass  graft thrombosis  Patient doing well,  L stump with ace bandage D/I  Hypotensive- tolerated HD treatment this morning BP lowest at 101 systolic    LLE pain  ACUTE ON cHRONIC iSCHEMIA  S/P L AKA Day 2  Mixed Nociceptive Neuropathic Pain pathways  Ofirmev 1Gm ordered ASAP  Fentanyl 12 mcg/hr for long acting pain relief  Methadone 2.5mg PO x 1 now reassess later today-will hit the NMDA receptor for phantom limb pain  Per daughter he did not respond well to Neurontin or lyrica in the past as he got more confused.     ESRD  Tunneled cathter placed during surgical experience after AKA  Used for dialysis today   Temporary right groin access placed 6/2   BUN/Cr 46.6/ 3.98  GFR 14    Anemia   Hemorrhagic shock  Resolved  Acute blood loss  H/H 9.1/ 28.6 improved  Following CBC Labs    Encounter for palliative care   Spoke with daughter Vanessa by phone   Stressed the importance of treating his pain, explained why I am ordering the medications, rationale and expectations  Spoke with Vascul;ar team  Dr Blount discussed analgesic plan  Spoke with Erinn Michelle DO Palliative Director who agree  DNR/DNI       Assessment /Plan  88M with  ESRD on HD, anemia, CHF with preserved EF, HTN, CAD, AFIB, PVD s/p recent bypass, LLE DVT, recently admitted with gastrointestinal bleeding, here with hemorrhagic shock related to graft bleeding s/p ligation of bleeding CryoVein fem-AT bypass, with AV graft thrombosis.     S/P L AKA POD #2  PAD, failed femoral tibial bypass  graft thrombosis  Patient doing well,  L stump with ace bandage D/I  Hypotensive- tolerated HD treatment this morning BP lowest at 101 systolic    LLE pain  ACUTE ON cHRONIC iSCHEMIA  S/P L AKA Day 2  Mixed Nociceptive Neuropathic Pain pathways  Ofirmev 1Gm ordered ASAP  Fentanyl 12 mcg/hr for long acting pain relief ordered-start date changed to tomorrow morning  Methadone 2.5mg PO x 1 now reassess later today-will hit the NMDA receptor for phantom limb pain  Per daughter he did not respond well to Neurontin or lyrica in the past as he got more confused.     ESRD  Tunneled cathter placed during surgical experience after AKA  Used for dialysis today   Temporary right groin access placed 6/2   BUN/Cr 46.6/ 3.98  GFR 14    Anemia   Hemorrhagic shock  Resolved  Acute blood loss  H/H 9.1/ 28.6 improved  Following CBC Labs    Encounter for palliative care   Spoke with daughter Vanessa by phone   Stressed the importance of treating his pain, explained why I am ordering the medications, rationale and expectations  Spoke with Vascul;ar team  Dr Blount discussed analgesic plan  Spoke with Erinn Michelle DO Palliative Director who agree  DNR/DNI

## 2022-06-06 NOTE — PROGRESS NOTE ADULT - ASSESSMENT
86yo M POD 3 lt aka. pt tolerated procedure well. pt tolerated dialysis via permacath    Plan:   - pt can continue heparin/ diet. pt should continue Zosyn. Dressing with xeroform, gauze, abd, kerlix, aace, ioban. take down in three days. i.e on Tuesday  - Shiley removed, has R IJ tunneled catheter for HD  -Trend H/H, transfuse PRN  - f/u PT recommendations  - f/u Speech and swallow.

## 2022-06-07 NOTE — PROGRESS NOTE ADULT - SUBJECTIVE AND OBJECTIVE BOX
OVERNIGHT EVENTS:  F/U Note:  No new events overnight  Alert and oreinted this morning, verbal and appropriate  He slept very well.  Received Methadone yesterday-which did provide fairly good analgesia  Fentanyl patch 12 mcg/hr placed this morning  OOB to chair with PT this morning-as per daughter "challenging"-its gonna take some time  Eating a late breakfast  Headlisting to Left side "heavy"  No fever, CO, palpitations N/V/D     Present Symptoms:   Dyspnea: none  Nausea/Vomiting: No  Anxiety:  No  Depression: No  Fatigue: Yes  Loss of appetite: No  Constipation:     Pain: LLE S/P AKA            Character-            Duration-            Effect-            Factors-            Frequency-            Location-            Severity-moderate lying still, severe when getting up to chair        Review of Systems: Reviewed           All others negative    MEDICATIONS  (STANDING):  acetaminophen     Tablet .. 650 milliGRAM(s) Oral every 6 hours  aspirin  chewable 81 milliGRAM(s) Oral daily  chlorhexidine 2% Cloths 1 Application(s) Topical <User Schedule>  dextrose 5%. 1000 milliLiter(s) (50 mL/Hr) IV Continuous <Continuous>  dextrose 5%. 1000 milliLiter(s) (100 mL/Hr) IV Continuous <Continuous>  dextrose 50% Injectable 25 Gram(s) IV Push once  dextrose 50% Injectable 12.5 Gram(s) IV Push once  dextrose 50% Injectable 25 Gram(s) IV Push once  fentaNYL   Patch  12 MICROgram(s)/Hr 1 Patch Transdermal every 72 hours  glucagon  Injectable 1 milliGRAM(s) IntraMuscular once  heparin   Injectable 5000 Unit(s) SubCutaneous every 8 hours  influenza  Vaccine (HIGH DOSE) 0.7 milliLiter(s) IntraMuscular once  isosorbide   mononitrate ER Tablet (IMDUR) 30 milliGRAM(s) Oral daily  melatonin 3 milliGRAM(s) Oral at bedtime  Nephro-pito 1 Tablet(s) Oral daily  NIFEdipine XL 90 milliGRAM(s) Oral daily  pantoprazole    Tablet 40 milliGRAM(s) Oral every 12 hours  piperacillin/tazobactam IVPB.. 3.375 Gram(s) IV Intermittent every 12 hours  polyethylene glycol 3350 17 Gram(s) Oral every 12 hours  senna 2 Tablet(s) Oral at bedtime  traZODone 100 milliGRAM(s) Oral at bedtime    MEDICATIONS  (PRN):  dextrose Oral Gel 15 Gram(s) Oral once PRN Blood Glucose LESS THAN 70 milliGRAM(s)/deciliter  HYDROmorphone  Injectable 0.25 milliGRAM(s) IV Push every 3 hours PRN Mild Pain (1 - 3)  HYDROmorphone  Injectable 0.5 milliGRAM(s) IV Push every 3 hours PRN Breakthrough pain  HYDROmorphone  Injectable 0.5 milliGRAM(s) IV Push every 3 hours PRN Moderate Pain (4 - 6)  sodium chloride 0.9% lock flush 10 milliLiter(s) IV Push every 1 hour PRN Pre/post blood products, medications, blood draw, and to maintain line patency      PHYSICAL EXAM:    Vital Signs Last 24 Hrs  T(C): 37.1 (07 Jun 2022 08:44), Max: 37.1 (07 Jun 2022 08:44)  T(F): 98.7 (07 Jun 2022 08:44), Max: 98.7 (07 Jun 2022 08:44)  HR: 49 (07 Jun 2022 08:44) (49 - 71)  BP: 134/55 (07 Jun 2022 08:44) (114/54 - 151/56)  BP(mean): --  RR: 18 (07 Jun 2022 08:44) (18 - 18)  SpO2: 99% (07 Jun 2022 08:44) (93% - 99%)    General: alert  oriented x _3___ lawake                  cachexia  verbal      Karnofsky: 20 %    HEENT: normal     Lungs: comfortable    CV: normal bradycardia    GI: normal                 constipation  last BM:     : normal  incontinent    MSK: weakness                bedbound/wheelchair bound    Skin:   no rash    LABS:                          8.8    6.66  )-----------( 195      ( 07 Jun 2022 07:10 )             28.4     06-07    139  |  99  |  29.2<H>  ----------------------------<  101<H>  4.0   |  28.0  |  2.91<H>    Ca    8.5<L>      07 Jun 2022 07:10  Phos  3.5     06-07  Mg     1.9     06-07    I&O's Summary    06 Jun 2022 07:01  -  07 Jun 2022 07:00  --------------------------------------------------------  IN: 0 mL / OUT: 1450 mL / NET: -1450 mL    RADIOLOGY & ADDITIONAL STUDIES:    ADVANCE DIRECTIVES/TREATMENT PREFERENCES:  DNR YES   Completed on:                     MOLST  YES    Completed on:  Living Will   NO   Completed on:

## 2022-06-07 NOTE — CHART NOTE - NSCHARTNOTEFT_GEN_A_CORE
First Take down of the dressing today ,   line of suture looks healthy , no bleeding, no oozing,  no necrotic areas, no erythema , no pressure areas, new dressing with xeroform, abd, wrap , kerlix and ace bandage  Pictures taken

## 2022-06-07 NOTE — PROGRESS NOTE ADULT - SUBJECTIVE AND OBJECTIVE BOX
CHRISTIANO ELIZABETH    [id number]    History:  The patient is status post left AKA, POD 3.  Patient is doing well. The patient's pain is controlled using the prescribed pain medications. Three reported bowel movements yesterday, non bloody. Currently no reports of  nausea, vomiting, chest pain, shortness of breath, abdominal pain or fever. No acute motor or sensory changes are reported.    Vital Signs Last 24 Hrs  T(C): 37.1 (07 Jun 2022 08:44), Max: 37.1 (07 Jun 2022 08:44)  T(F): 98.7 (07 Jun 2022 08:44), Max: 98.7 (07 Jun 2022 08:44)  HR: 49 (07 Jun 2022 08:44) (49 - 75)  BP: 134/55 (07 Jun 2022 08:44) (101/51 - 151/56)  BP(mean): --  RR: 18 (07 Jun 2022 08:44) (18 - 18)  SpO2: 99% (07 Jun 2022 08:44) (93% - 100%)  I&O's Summary    06 Jun 2022 07:01  -  07 Jun 2022 07:00  --------------------------------------------------------  IN: 0 mL / OUT: 1450 mL / NET: -1450 mL                              8.8    6.66  )-----------( 195      ( 07 Jun 2022 07:10 )             28.4     06-07    139  |  99  |  29.2<H>  ----------------------------<  101<H>  4.0   |  28.0  |  2.91<H>    Ca    8.5<L>      07 Jun 2022 07:10  Phos  3.5     06-07  Mg     1.9     06-07        MEDICATIONS  (STANDING):  acetaminophen     Tablet .. 650 milliGRAM(s) Oral every 6 hours  aspirin  chewable 81 milliGRAM(s) Oral daily  chlorhexidine 2% Cloths 1 Application(s) Topical <User Schedule>  dextrose 5%. 1000 milliLiter(s) (100 mL/Hr) IV Continuous <Continuous>  dextrose 5%. 1000 milliLiter(s) (50 mL/Hr) IV Continuous <Continuous>  dextrose 50% Injectable 25 Gram(s) IV Push once  dextrose 50% Injectable 12.5 Gram(s) IV Push once  dextrose 50% Injectable 25 Gram(s) IV Push once  fentaNYL   Patch  12 MICROgram(s)/Hr 1 Patch Transdermal every 72 hours  glucagon  Injectable 1 milliGRAM(s) IntraMuscular once  heparin   Injectable 5000 Unit(s) SubCutaneous every 8 hours  influenza  Vaccine (HIGH DOSE) 0.7 milliLiter(s) IntraMuscular once  isosorbide   mononitrate ER Tablet (IMDUR) 30 milliGRAM(s) Oral daily  melatonin 3 milliGRAM(s) Oral at bedtime  Nephro-pito 1 Tablet(s) Oral daily  NIFEdipine XL 90 milliGRAM(s) Oral daily  pantoprazole    Tablet 40 milliGRAM(s) Oral every 12 hours  piperacillin/tazobactam IVPB.. 3.375 Gram(s) IV Intermittent every 12 hours  polyethylene glycol 3350 17 Gram(s) Oral every 12 hours  senna 2 Tablet(s) Oral at bedtime  traZODone 100 milliGRAM(s) Oral at bedtime    MEDICATIONS  (PRN):  dextrose Oral Gel 15 Gram(s) Oral once PRN Blood Glucose LESS THAN 70 milliGRAM(s)/deciliter  HYDROmorphone  Injectable 0.5 milliGRAM(s) IV Push every 3 hours PRN Breakthrough pain  HYDROmorphone  Injectable 0.5 milliGRAM(s) IV Push every 3 hours PRN Moderate Pain (4 - 6)  HYDROmorphone  Injectable 0.25 milliGRAM(s) IV Push every 3 hours PRN Mild Pain (1 - 3)  sodium chloride 0.9% lock flush 10 milliLiter(s) IV Push every 1 hour PRN Pre/post blood products, medications, blood draw, and to maintain line patency      Physical exam:     No distress, asleep but arousable  non labored breathing  no gross abdomen distention  Right IJ permacath with clean dressing.  left AVF with  no palpable thrill  Left AKA with clean compression wrap.      Primary  Assessment:  • POD 3 , s/p Left AKA  - currently stable from vascular surgical stand point    Plan:   • dressing change planned for later today  - will change diet per family's request  - will need left AVF to be revised at some point continue access via permacath for now  - continue PT  - will also need to determine an end date for the antibiotics

## 2022-06-07 NOTE — PROGRESS NOTE ADULT - ASSESSMENT
Assessment /Plan  88M with  ESRD on HD, anemia, CHF with preserved EF, HTN, CAD, AFIB, PVD s/p recent bypass, LLE DVT, recently admitted with gastrointestinal bleeding, here with hemorrhagic shock related to graft bleeding s/p ligation of bleeding CryoVein fem-AT bypass, with AV graft thrombosis.     S/P L AKA POD #2  PAD, failed femoral tibial bypass  graft thrombosis  Patient doing well,  L stump with ace bandage D/I  Hypotensive- tolerated HD treatment this morning BP lowest at 101 systolic    LLE pain  ACUTE ON cHRONIC iSCHEMIA  S/P L AKA Day 2  Mixed Nociceptive Neuropathic Pain pathways  Ofirmev 1Gm ordered ASAP  Fentanyl 12 mcg/hr for long acting pain relief ordered-start date changed to tomorrow morning  Methadone 2.5mg PO x 1 now reassess later today-will hit the NMDA receptor for phantom limb pain  Per daughter he did not respond well to Neurontin or lyrica in the past as he got more confused.     ESRD  Tunneled cathter placed during surgical experience after AKA  Used for dialysis today   Temporary right groin access placed 6/2   BUN/Cr 29.2/2.91 improving renal recovery GFR 14    Anemia   Hemorrhagic shock  Resolved  Acute blood loss  H/H 8.8/ 28.4 stable  Following CBC Labs    Encounter for palliative care   Met  with daughter Vanessa in Patient's room.  She is accepting the a Fentnayl 12 mcg/hr patch was initiated this morning  Still apprehensive about having Methadone available prn  Stressed the importance of treating his pain. I said it was not ordered again today  Goal is to be able to particpate in PT/OT and be able to increase his strength and mobility. First time up today was difficult.  Medical team should continue to assess his Pain to support a successful PT recovery

## 2022-06-07 NOTE — PROGRESS NOTE ADULT - NSPROGADDITIONALINFOA_GEN_ALL_CORE
Total Time Spent__20__ minutes  This includes chart review, patient assessment, discussion and collaboration with interdisciplinary team members, ACP planning    COUNSELING:  Face to face meeting to discuss Advanced Care Planning - Time Spent ______Minutes.      Thank you for the opportunity to assist with the care of this patient.   [facility] Palliative Medicine Consult Service [phone].

## 2022-06-07 NOTE — PROGRESS NOTE ADULT - ASSESSMENT
ESRD on HD   POD 3 , s/p Left AKA  Anemia of CKD      HD tomorrow- UF as tolerated  Hb low- AMY with HD

## 2022-06-07 NOTE — CONSULT NOTE ADULT - SUBJECTIVE AND OBJECTIVE BOX
[facility]                              [address] [address]                                                 PHONE: [phone]                                                 FAX: [phone]  ------------------------------------------------------------------------------------------------    Chief complaint: fatigue    88y Male known to us from prior admissions  status post left AKA. At the time of evaluation, pt is in bed. Reports feeling fatigued and tired. No chest pain. Breathing at baseline. H/H decreasing from baseline.     PAST MEDICAL & SURGICAL HISTORY:  DM (diabetes mellitus)  - resolved      AV block, 1st degree      Arrhythmia      CAD (coronary artery disease)      HTN (hypertension)      VT (ventricular tachycardia)      RICHARDS (dyspnea on exertion)      Anemia      Risk factors for obstructive sleep apnea      ESRD on dialysis  HD on Mondays and Fridays      Aortic stenosis  - s/p valve replacement      GI bleeding  due to ulcerated polyps and colonic AVMs      H/O circumcision  at  age  65      H/O left knee surgery      H/O angioplasty  2013,  no  intervention      A-V fistula  left arm 5/2017      H/O carotid endarterectomy  Right      S/P TAVR (transcatheter aortic valve replacement)      History of loop recorder          Plavix (Hives)  Toprol-XL (Rash)      Review of Systems:  Positive for fatigue  Rest of the systems were reviewed and was negative.     Family history:   No pertinent family history in first degree relative of early CAD, sudden cardiac death or  congenital heart disease    Social History:  No smoking   No alcohol  No other drug use    Vital Signs Last 24 Hrs  T(C): 37.1 (07 Jun 2022 08:44), Max: 37.1 (07 Jun 2022 08:44)  T(F): 98.7 (07 Jun 2022 08:44), Max: 98.7 (07 Jun 2022 08:44)  HR: 49 (07 Jun 2022 08:44) (49 - 75)  BP: 134/55 (07 Jun 2022 08:44) (101/51 - 151/56)  BP(mean): --  RR: 18 (07 Jun 2022 08:44) (18 - 18)  SpO2: 99% (07 Jun 2022 08:44) (93% - 100%)    PHYSICAL EXAM:  Constitutional: Elderly male in bed  HEENT:     Head: Normocephalic and atraumatic  Eyes: Conjunctiva normal, No scleral icterus  Neck: Supple, No JVD  Mouth/Throat: Mucous membranes are normal. Mucosa are not pale or dry.  Cardiovascular: regular S1, S2 + ESM  Respiratory: Lungs clear to auscultation; no crepitations, no wheeze  Gastrointestinal:  Soft, Non-tender, + BS	  Musculoskeletal: Normal range of motion. No edema  Skin: Warm and dry. No cyanosis . No diaphoresis.  Neurologic: No tremor.  Psychiatric: Flat affect          LABS:                        8.8    6.66  )-----------( 195      ( 07 Jun 2022 07:10 )             28.4     06-07    139  |  99  |  29.2<H>  ----------------------------<  101<H>  4.0   |  28.0  |  2.91<H>    Ca    8.5<L>      07 Jun 2022 07:10  Phos  3.5     06-07  Mg     1.9     06-07    RADIOLOGY & ADDITIONAL STUDIES: (reviewed)  CXR was independently visualized/reviewed and demonstrated: Continued elevation of right hemidiaphragm.    Right basilar subsegmental atelectasis/trace right pleural effusion.      CARDIOLOGY TESTING:(reviewed)     ECG (Independent visualization): Paced    ECHOCARDIOGRAM :  Summary:   1. Left ventricular ejection fraction, by visual estimation, is 60 to   65%.   2. Normal global left ventricular systolic function.  3. Moderate to severe left atrial enlargement.   4. Abnormal septal motion consistent with post-operative status.   5. Mildly increased LV wall thickness.   6. Mild to moderately enlarged right atrium.   7. Moderate mitral valve regurgitation.   8. Thickening and calcification of the anterior and posterior mitral   valve leaflets.   9. Transmitral mean gradient is 13.4 mmHg at HR 89 bpm.  10. Mild-moderate tricuspid regurgitation.  11. 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 Gradients suggestive of normally   functioning prosthetic valve. No significant change from prior study   dated 11/2021.  12. Estimated pulmonary artery systolic pressure is 66.4 mmHg assuming a   right atrial pressure of 3 mmHg, which is consistent with severe   pulmonary hypertension.  13. Compared to prior TTE study dated 11/2021, no significant interval   changes have occurred.    MEDICATIONS:(reviewed)  Home Medications:    MEDICATIONS  (STANDING):  acetaminophen     Tablet .. 650 milliGRAM(s) Oral every 6 hours  aspirin  chewable 81 milliGRAM(s) Oral daily  chlorhexidine 2% Cloths 1 Application(s) Topical <User Schedule>  chlorhexidine 2% Cloths 1 Application(s) Topical <User Schedule>  dextrose 5%. 1000 milliLiter(s) (50 mL/Hr) IV Continuous <Continuous>  dextrose 5%. 1000 milliLiter(s) (100 mL/Hr) IV Continuous <Continuous>  dextrose 50% Injectable 12.5 Gram(s) IV Push once  dextrose 50% Injectable 25 Gram(s) IV Push once  dextrose 50% Injectable 25 Gram(s) IV Push once  fentaNYL   Patch  12 MICROgram(s)/Hr 1 Patch Transdermal every 72 hours  glucagon  Injectable 1 milliGRAM(s) IntraMuscular once  heparin   Injectable 5000 Unit(s) SubCutaneous every 8 hours  influenza  Vaccine (HIGH DOSE) 0.7 milliLiter(s) IntraMuscular once  isosorbide   mononitrate ER Tablet (IMDUR) 30 milliGRAM(s) Oral daily  melatonin 3 milliGRAM(s) Oral at bedtime  Nephro-pito 1 Tablet(s) Oral daily  NIFEdipine XL 90 milliGRAM(s) Oral daily  pantoprazole    Tablet 40 milliGRAM(s) Oral every 12 hours  piperacillin/tazobactam IVPB.. 3.375 Gram(s) IV Intermittent every 12 hours  polyethylene glycol 3350 17 Gram(s) Oral every 12 hours  senna 2 Tablet(s) Oral at bedtime  traZODone 100 milliGRAM(s) Oral at bedtime    MEDICATIONS  (PRN):  dextrose Oral Gel 15 Gram(s) Oral once PRN Blood Glucose LESS THAN 70 milliGRAM(s)/deciliter  HYDROmorphone  Injectable 0.5 milliGRAM(s) IV Push every 3 hours PRN Breakthrough pain  HYDROmorphone  Injectable 0.5 milliGRAM(s) IV Push every 3 hours PRN Moderate Pain (4 - 6)  HYDROmorphone  Injectable 0.25 milliGRAM(s) IV Push every 3 hours PRN Mild Pain (1 - 3)  sodium chloride 0.9% lock flush 10 milliLiter(s) IV Push every 1 hour PRN Pre/post blood products, medications, blood draw, and to maintain line patency  sodium chloride 0.9% lock flush 10 milliLiter(s) IV Push every 1 hour PRN Pre/post blood products, medications, blood draw, and to maintain line patency      ASSESSMENT AND PLAN:    88y Male with past medical history significant for ESRD on HD PAF off AC due to GIBs, leadless PPM, ILR in place, non obstructive CAD with normal EF, AS s/p TAVR, ESRD on HD, PAD s/p RLE fem-pop bypass, w/ revised LLE fem-bypass in march of 2021, complicated by LLE SFA and pop artery occlusion w/ DP and AT runoff s/p L fem to AT bypass w/ cryovein on 4/13/22 admitted 5/31 with fall and bleeding from leg. s/p multiple units of blood, and urgently taken to OR for operative ligation of bleeding cryovein fem-AT bypass. Patient with AV graft thrombosis- now s/p AKA and placement of R IJ permacatheter.    Volume status stable on HD  H/H stable. Off low dose Eliquis  BP stable

## 2022-06-07 NOTE — PROGRESS NOTE ADULT - SUBJECTIVE AND OBJECTIVE BOX
[facility] DIVISION OF KIDNEY DISEASES AND HYPERTENSION -- HEMODIALYSIS NOTE  --------------------------------------------------------------------------------  Chief Complaint: ESRD/Ongoing hemodialysis requirement    24 hour events/subjective:  s/p HD yesterday- tolerated well  pt seen and examined; feels better      PAST HISTORY  --------------------------------------------------------------------------------  No significant changes to PMH, PSH, FHx, SHx, unless otherwise noted    ALLERGIES & MEDICATIONS  --------------------------------------------------------------------------------  Allergies    Plavix (Hives)  Toprol-XL (Rash)    Intolerances      Standing Inpatient Medications  acetaminophen     Tablet .. 650 milliGRAM(s) Oral every 6 hours  aspirin  chewable 81 milliGRAM(s) Oral daily  chlorhexidine 2% Cloths 1 Application(s) Topical <User Schedule>  dextrose 5%. 1000 milliLiter(s) IV Continuous <Continuous>  dextrose 5%. 1000 milliLiter(s) IV Continuous <Continuous>  dextrose 50% Injectable 25 Gram(s) IV Push once  dextrose 50% Injectable 12.5 Gram(s) IV Push once  dextrose 50% Injectable 25 Gram(s) IV Push once  fentaNYL   Patch  12 MICROgram(s)/Hr 1 Patch Transdermal every 72 hours  glucagon  Injectable 1 milliGRAM(s) IntraMuscular once  heparin   Injectable 5000 Unit(s) SubCutaneous every 8 hours  influenza  Vaccine (HIGH DOSE) 0.7 milliLiter(s) IntraMuscular once  isosorbide   mononitrate ER Tablet (IMDUR) 30 milliGRAM(s) Oral daily  melatonin 3 milliGRAM(s) Oral at bedtime  Nephro-pito 1 Tablet(s) Oral daily  NIFEdipine XL 90 milliGRAM(s) Oral daily  pantoprazole    Tablet 40 milliGRAM(s) Oral every 12 hours  piperacillin/tazobactam IVPB.. 3.375 Gram(s) IV Intermittent every 12 hours  polyethylene glycol 3350 17 Gram(s) Oral every 12 hours  senna 2 Tablet(s) Oral at bedtime  traZODone 100 milliGRAM(s) Oral at bedtime    PRN Inpatient Medications  dextrose Oral Gel 15 Gram(s) Oral once PRN  HYDROmorphone  Injectable 0.5 milliGRAM(s) IV Push every 3 hours PRN  HYDROmorphone  Injectable 0.5 milliGRAM(s) IV Push every 3 hours PRN  HYDROmorphone  Injectable 0.25 milliGRAM(s) IV Push every 3 hours PRN  sodium chloride 0.9% lock flush 10 milliLiter(s) IV Push every 1 hour PRN      REVIEW OF SYSTEMS  --------------------------------------------------------------------------------  Gen: No weight changes, fatigue, fevers/chills, weakness  Skin: No rashes  Head/Eyes/Ears/Mouth: No headache  Respiratory: No dyspnea, cough,  CV: No chest pain, orthopnea  GI: No abdominal pain, diarrhea, constipation, nausea, vomiting,  MSK: No joint pain  Neuro: No dizziness/lightheadedness, weakness  Heme: No bleeding  Psych: No significant nervousness, anxiety, stress, depression    All other systems were reviewed and are negative, except as noted.    VITALS/PHYSICAL EXAM  --------------------------------------------------------------------------------  T(C): 37.1 (06-07-22 @ 08:44), Max: 37.1 (06-07-22 @ 08:44)  HR: 49 (06-07-22 @ 08:44) (49 - 71)  BP: 134/55 (06-07-22 @ 08:44) (114/54 - 151/56)  RR: 18 (06-07-22 @ 08:44) (18 - 18)  SpO2: 99% (06-07-22 @ 08:44) (93% - 99%)  Wt(kg): --        06-06-22 @ 07:01  -  06-07-22 @ 07:00  --------------------------------------------------------  IN: 0 mL / OUT: 1450 mL / NET: -1450 mL      Physical Exam:  	Gen: NAD  	HEENT: PERRL, supple neck,  	Pulm: CTA B/L  	CV: RRR, S1S2; no rub  	Abd: +BS, soft, nontender  	UE: Warm  	LE: Warm, + edema  	Neuro: No focal deficits  	Psych: Normal affect and mood  	Skin: Warm, without rashes  	Vascular access: AVF    LABS/STUDIES  --------------------------------------------------------------------------------              8.8    6.66  >-----------<  195      [06-07-22 @ 07:10]              28.4     139  |  99  |  29.2  ----------------------------<  101      [06-07-22 @ 07:10]  4.0   |  28.0  |  2.91        Ca     8.5     [06-07-22 @ 07:10]      Mg     1.9     [06-07-22 @ 07:10]      Phos  3.5     [06-07-22 @ 07:10]            Iron 43, TIBC 249, %sat 17      [07-13-21 @ 16:07]  Ferritin 498      [07-13-21 @ 16:07]  HbA1c 4.9      [08-09-18 @ 05:54]  TSH 5.55      [06-03-22 @ 15:44]    HBsAg Nonreact      [05-16-22 @ 11:20]

## 2022-06-08 NOTE — PROGRESS NOTE ADULT - ASSESSMENT
88M with  ESRD on HD, anemia, CHF with preserved EF, HTN, CAD, AFIB, PVD s/p recent bypass, LLE DVT, recently admitted with gastrointestinal bleeding, here with hemorrhagic shock related to graft bleeding s/p ligation of bleeding CryoVein fem-AT bypass, with AV graft thrombosis.     #1 S/P L AKA POD #3  PAD, failed femoral tibial bypass  graft thrombosis  Patient doing well,  L stump with ace bandage D/I    #2 LLE pain  - ACUTE ON cHRONIC iSCHEMIA  S/P L AKA Day 3  Mixed Nociceptive Neuropathic Pain pathways  Fentanyl 12 mcg/hr for long acting pain relief ordered-start date changed to tomorrow morning  Methadone 2.5mg PO x 1 now reassess later today-will hit the NMDA receptor for phantom limb pain  Per daughter he did not respond well to Neurontin or lyrica in the past as he got more confused.     ESRD  Tunneled cathter placed during surgical experience after AKA  Used for dialysis today   Temporary right groin access placed 6/2   BUN/Cr 29.2/2.91 improving renal recovery GFR 14    Anemia   Hemorrhagic shock  Resolved  Acute blood loss  H/H 8.8/ 28.4 stable  Following CBC Labs    Encounter for palliative care    88M with  ESRD on HD, anemia, CHF with preserved EF, HTN, CAD, AFIB, PVD s/p recent bypass, LLE DVT, recently admitted with gastrointestinal bleeding, here with hemorrhagic shock related to graft bleeding s/p ligation of bleeding CryoVein fem-AT bypass, with AV graft thrombosis.     #1 S/P L AKA POD #3  PAD, failed femoral tibial bypass  graft thrombosis  Patient doing well,  L stump with ace bandage D/I    #2 LLE pain  - ACUTE ON cHRONIC iSCHEMIA  S/P L AKA Day 3  Mixed Nociceptive Neuropathic Pain pathways  Fentanyl 12 mcg/hr for long acting pain relief ordered   Methadone 2.5mg PO x 1 given yesterday - will hit the NMDA receptor for phantom limb pain  Per daughter he did not respond well to Neurontin or lyrica in the past as he got more confused.     ESRD  Tunneled cathter placed during surgical experience after AKA  Used for dialysis today   Temporary right groin access placed 6/2   BUN/Cr 29.2/2.91 improving renal recovery GFR 14    Anemia   Hemorrhagic shock  Resolved  Acute blood loss  H/H 8.8/ 28.4 stable  Following CBC Labs    Encounter for palliative care   - pain better controlled  - likely home discharge as family has not wanted him in rehab setting in the past

## 2022-06-08 NOTE — PROGRESS NOTE ADULT - SUBJECTIVE AND OBJECTIVE BOX
[facility] DIVISION OF KIDNEY DISEASES AND HYPERTENSION -- HEMODIALYSIS NOTE  --------------------------------------------------------------------------------  Chief Complaint: ESRD/Ongoing hemodialysis requirement    24 hour events/subjective:        PAST HISTORY  --------------------------------------------------------------------------------  No significant changes to PMH, PSH, FHx, SHx, unless otherwise noted    ALLERGIES & MEDICATIONS  --------------------------------------------------------------------------------  Allergies    Plavix (Hives)  Toprol-XL (Rash)    Intolerances      Standing Inpatient Medications  acetaminophen     Tablet .. 650 milliGRAM(s) Oral every 6 hours  aspirin  chewable 81 milliGRAM(s) Oral daily  chlorhexidine 2% Cloths 1 Application(s) Topical <User Schedule>  dextrose 5%. 1000 milliLiter(s) IV Continuous <Continuous>  dextrose 5%. 1000 milliLiter(s) IV Continuous <Continuous>  dextrose 50% Injectable 25 Gram(s) IV Push once  dextrose 50% Injectable 12.5 Gram(s) IV Push once  dextrose 50% Injectable 25 Gram(s) IV Push once  fentaNYL   Patch  12 MICROgram(s)/Hr 1 Patch Transdermal every 72 hours  glucagon  Injectable 1 milliGRAM(s) IntraMuscular once  heparin   Injectable 5000 Unit(s) SubCutaneous every 8 hours  influenza  Vaccine (HIGH DOSE) 0.7 milliLiter(s) IntraMuscular once  isosorbide   mononitrate ER Tablet (IMDUR) 30 milliGRAM(s) Oral daily  melatonin 3 milliGRAM(s) Oral at bedtime  Nephro-pito 1 Tablet(s) Oral daily  NIFEdipine XL 90 milliGRAM(s) Oral daily  pantoprazole    Tablet 40 milliGRAM(s) Oral every 12 hours  piperacillin/tazobactam IVPB.. 3.375 Gram(s) IV Intermittent every 12 hours  polyethylene glycol 3350 17 Gram(s) Oral every 12 hours  senna 2 Tablet(s) Oral at bedtime  traZODone 100 milliGRAM(s) Oral at bedtime    PRN Inpatient Medications  dextrose Oral Gel 15 Gram(s) Oral once PRN  HYDROmorphone  Injectable 0.25 milliGRAM(s) IV Push every 3 hours PRN  sodium chloride 0.9% lock flush 10 milliLiter(s) IV Push every 1 hour PRN      REVIEW OF SYSTEMS  --------------------------------------------------------------------------------  Gen: No weight changes, fatigue, fevers/chills, weakness  Skin: No rashes  Head/Eyes/Ears/Mouth: No headache  Respiratory: No dyspnea, cough,  CV: No chest pain, orthopnea  GI: No abdominal pain, diarrhea, constipation, nausea, vomiting,  MSK: No joint pain  Neuro: No dizziness/lightheadedness, weakness  Heme: No bleeding  Psych: No significant nervousness, anxiety, stress, depression    All other systems were reviewed and are negative, except as noted.    VITALS/PHYSICAL EXAM  --------------------------------------------------------------------------------  T(C): 36.4 (06-08-22 @ 12:25), Max: 36.7 (06-07-22 @ 17:33)  HR: 55 (06-08-22 @ 12:25) (55 - 75)  BP: 129/52 (06-08-22 @ 12:25) (124/52 - 129/52)  RR: 18 (06-08-22 @ 12:25) (18 - 18)  SpO2: 100% (06-08-22 @ 12:25) (92% - 100%)  Wt(kg): --        06-07-22 @ 07:01  -  06-08-22 @ 07:00  --------------------------------------------------------  IN: 0 mL / OUT: 20 mL / NET: -20 mL      Physical Exam:  	Gen: NAD  	HEENT:  supple neck,  	Pulm: CTA B/L  	CV: RRR, S1S2; no rub  	Abd: +BS, soft, nontender  	UE: Warm,  	LE: Warm, no  edema  	Neuro: No focal deficits  	Psych: Normal affect and mood  	Skin: Warm  	Vascular access: [facility]    LABS/STUDIES  --------------------------------------------------------------------------------              9.4    6.78  >-----------<  249      [06-08-22 @ 06:39]              30.1     139  |  101  |  40.8  ----------------------------<  108      [06-08-22 @ 06:39]  4.5   |  23.0  |  4.15        Ca     8.6     [06-08-22 @ 06:39]      Mg     2.1     [06-08-22 @ 06:39]      Phos  4.3     [06-08-22 @ 06:39]            Iron 43, TIBC 249, %sat 17      [07-13-21 @ 16:07]  Ferritin 498      [07-13-21 @ 16:07]  HbA1c 4.9      [08-09-18 @ 05:54]  TSH 5.55      [06-03-22 @ 15:44]    HBsAg Nonreact      [05-16-22 @ 11:20]

## 2022-06-08 NOTE — PROGRESS NOTE ADULT - ASSESSMENT
Assessment and Plan:   · Assessment	  86yo M POD 5 lt aka. pt tolerated procedure well. pt tolerated dialysis via permacath    Plan:  -Nephro HD today   - pt can continue heparin SQ  VDVT prophylaxis   - cardiology called as per daughter request  - no new recommendations since H and H stable and BP stable   - Diet recommended per Speech and Swallow  specialist--  puree and no liquids to avoid  aspiration but daughter requested  regular   -Dressing with xeroform, gauze, abd, kerlix, ace,   - Palliative  also   - Shiley removed, has R IJ tunneled catheter for HD  - f/u PT recommendations recommeded home VS Val , familly wants home    Assessment and Plan:   · Assessment	  88yo M POD 5 lt aka. pt tolerated procedure well. pt tolerated dialysis via permacath    Plan:  -Nephro HD today   - pt can continue heparin SQ  VDVT prophylaxis   - cardiology called as per daughter request  - no new recommendations since H and H stable and BP stable   - Diet recommended per Speech and Swallow  specialist--  puree and no liquids to avoid  aspiration but daughter requested  regular   -Dressing with xeroform, gauze, abd, kerlix, ace,   - Palliative  also   - Shiley removed, has R IJ tunneled catheter for HD  - f/u PT recommendations recommeded home VS Val , familly wants home   - Daily change of dressing    Assessment and Plan:   · Assessment	  88yo M POD 5 lt aka. pt tolerated procedure well. pt tolerated dialysis via permacath    Plan:  -Nephro HD today   - pt can continue heparin SQ  VDVT prophylaxis   - cardiology called as per daughter request  - no new recommendations since H and H stable and BP stable   - Diet recommended per Speech and Swallow  specialist--  puree and no liquids to avoid  aspiration but daughter requested  regular   -Dressing with xeroform, gauze, abd, kerlix, ace,   - Palliative  also onboard for his care  - Rosendo removed, has R IJ tunneled catheter for HD  - f/u PT recommendations recommeded home VS Val , familly wants home   - Daily change of dressing

## 2022-06-08 NOTE — PHARMACOTHERAPY INTERVENTION NOTE - NSPHARMCOMMMONITOR
Called pt daughter, both medications were filled 04/07/22. 90 days with 3 refills.  Pt daughter v/u and will call pharmacy
Pts Cee Bleacher is requesting a one month supply of   clopidogrel (PLAVIX) 75 MG tablet    atorvastatin (LIPITOR) 80 MG tablet    [facility]  Last ov 04/07/2022 kalie
Lab/Test Recommended
Lab/Test Recommended

## 2022-06-08 NOTE — PROGRESS NOTE ADULT - SUBJECTIVE AND OBJECTIVE BOX
OVERNIGHT EVENTS: working with physical therapy and feeling well other than needing to go to bathroom frequently     Present Symptoms:     Dyspnea: none   Nausea/Vomiting: No  Anxiety:  No  Depression: No  Fatigue: No  Loss of appetite: No  Constipation: none     Pain: none currently             Character-            Duration-            Effect-            Factors-            Frequency-            Location-            Severity-    Pain AD Score:  http://geriatrictoolkit.[email or web address]/painad.pdf (press ctrl + left click to view)    Review of Systems: Reviewed                                        Positive: frequent bowel movements   All others negative    MEDICATIONS  (STANDING):  acetaminophen     Tablet .. 650 milliGRAM(s) Oral every 6 hours  aspirin  chewable 81 milliGRAM(s) Oral daily  chlorhexidine 2% Cloths 1 Application(s) Topical <User Schedule>  dextrose 5%. 1000 milliLiter(s) (50 mL/Hr) IV Continuous <Continuous>  dextrose 5%. 1000 milliLiter(s) (100 mL/Hr) IV Continuous <Continuous>  dextrose 50% Injectable 25 Gram(s) IV Push once  dextrose 50% Injectable 12.5 Gram(s) IV Push once  dextrose 50% Injectable 25 Gram(s) IV Push once  epoetin juan-epbx (RETACRIT) Injectable 93727 Unit(s) IV Push <User Schedule>  fentaNYL   Patch  12 MICROgram(s)/Hr 1 Patch Transdermal every 72 hours  glucagon  Injectable 1 milliGRAM(s) IntraMuscular once  heparin   Injectable 5000 Unit(s) SubCutaneous every 8 hours  influenza  Vaccine (HIGH DOSE) 0.7 milliLiter(s) IntraMuscular once  isosorbide   mononitrate ER Tablet (IMDUR) 30 milliGRAM(s) Oral daily  melatonin 3 milliGRAM(s) Oral at bedtime  Nephro-pito 1 Tablet(s) Oral daily  NIFEdipine XL 90 milliGRAM(s) Oral daily  pantoprazole    Tablet 40 milliGRAM(s) Oral every 12 hours  piperacillin/tazobactam IVPB.. 3.375 Gram(s) IV Intermittent every 12 hours  polyethylene glycol 3350 17 Gram(s) Oral every 12 hours  senna 2 Tablet(s) Oral at bedtime  traZODone 100 milliGRAM(s) Oral at bedtime    MEDICATIONS  (PRN):  dextrose Oral Gel 15 Gram(s) Oral once PRN Blood Glucose LESS THAN 70 milliGRAM(s)/deciliter  HYDROmorphone  Injectable 0.25 milliGRAM(s) IV Push every 3 hours PRN Mild Pain (1 - 3)  sodium chloride 0.9% lock flush 10 milliLiter(s) IV Push every 1 hour PRN Pre/post blood products, medications, blood draw, and to maintain line patency    PHYSICAL EXAM:    Vital Signs Last 24 Hrs  T(C): 36.4 (08 Jun 2022 12:25), Max: 36.7 (07 Jun 2022 17:33)  T(F): 97.6 (08 Jun 2022 12:25), Max: 98 (07 Jun 2022 17:33)  HR: 55 (08 Jun 2022 12:25) (55 - 75)  BP: 129/52 (08 Jun 2022 12:25) (124/52 - 129/52)  BP(mean): --  RR: 18 (08 Jun 2022 12:25) (18 - 18)  SpO2: 100% (08 Jun 2022 12:25) (92% - 100%)    General: alert knows he is in hospital     Karnofsky:  30 %    HEENT: normal      Lungs: comfortable     CV: normal      GI: normal     : normal     MSK: weakness, s/p L AKA      Skin: no rash    LABS:                      9.4    6.78  )-----------( 249      ( 08 Jun 2022 06:39 )             30.1     06-08    139  |  101  |  40.8<H>  ----------------------------<  108<H>  4.5   |  23.0  |  4.15<H>    Ca    8.6      08 Jun 2022 06:39  Phos  4.3     06-08  Mg     2.1     06-08    I&O's Summary    07 Jun 2022 07:01  -  08 Jun 2022 07:00  --------------------------------------------------------  IN: 0 mL / OUT: 20 mL / NET: -20 mL    RADIOLOGY & ADDITIONAL STUDIES:    < from: Xray Chest 1 View AP/PA. (06.03.22 @ 15:55) >  CC: [id number] EXAM:  XR CHEST AP OR PA 1V                          PROCEDURE DATE:  06/03/2022          INTERPRETATION:  Exam:XR CHEST    clinical history:Fever    Increased bibasilar airspace disease. Pneumonia not excluded. No   pneumothorax.    IMPRESSION: Increasing bilateral airspace disease as above    --- End of Report ---    < end of copied text >    < from: Xray Chest 1 View-PORTABLE IMMEDIATE (Xray Chest 1 View-PORTABLE IMMEDIATE .) (06.05.22 @ 07:12) >    IMPRESSION:  Continued elevation of right hemidiaphragm.    Right basilar subsegmental atelectasis/trace right pleural effusion.    --- End of Report ---    < end of copied text >    ADVANCE DIRECTIVES/TREATMENT PREFERENCES:  DNR/DNI

## 2022-06-08 NOTE — PROGRESS NOTE ADULT - ASSESSMENT
ESRD on HD   POD 5 , s/p Left AKA  Anemia of CKD  CHF- compensated      HD today- UF as tolerated  Hb low- AMY with HD  BP stable- UF as tolerated

## 2022-06-08 NOTE — PROGRESS NOTE ADULT - SUBJECTIVE AND OBJECTIVE BOX
[facility]                              [address] [address]                                                 PHONE: [phone]                                                 FAX: [phone]  ------------------------------------------------------------------------------------------------    Chief complaint: fatigue    88y Male known to us from prior admissions  status post left AKA. At the time of evaluation, pt is in bed. Reports feeling fatigued and tired. No chest pain. Breathing at baseline.   Pt presently denies any CP or SOB  H/H stable    PAST MEDICAL & SURGICAL HISTORY:  DM (diabetes mellitus)  - resolved      AV block, 1st degree      Arrhythmia      CAD (coronary artery disease)      HTN (hypertension)      VT (ventricular tachycardia)      RICHARDS (dyspnea on exertion)      Anemia      Risk factors for obstructive sleep apnea      ESRD on dialysis  HD on Mondays and Fridays      Aortic stenosis  - s/p valve replacement      GI bleeding  due to ulcerated polyps and colonic AVMs      H/O circumcision  at  age  65      H/O left knee surgery      H/O angioplasty  2013,  no  intervention      A-V fistula  left arm 5/2017      H/O carotid endarterectomy  Right      S/P TAVR (transcatheter aortic valve replacement)      History of loop recorder          Plavix (Hives)  Toprol-XL (Rash)      Review of Systems:  Positive for fatigue  Rest of the systems were reviewed and was negative.     Family history:   No pertinent family history in first degree relative of early CAD, sudden cardiac death or  congenital heart disease    Social History:  No smoking   No alcohol  No other drug use    Vital Signs Last 24 Hrs  T(C): 37.1 (07 Jun 2022 08:44), Max: 37.1 (07 Jun 2022 08:44)  T(F): 98.7 (07 Jun 2022 08:44), Max: 98.7 (07 Jun 2022 08:44)  HR: 49 (07 Jun 2022 08:44) (49 - 75)  BP: 134/55 (07 Jun 2022 08:44) (101/51 - 151/56)  BP(mean): --  RR: 18 (07 Jun 2022 08:44) (18 - 18)  SpO2: 99% (07 Jun 2022 08:44) (93% - 100%)    PHYSICAL EXAM:  Constitutional: Elderly male in bed  HEENT:     Head: Normocephalic and atraumatic  Eyes: Conjunctiva normal, No scleral icterus  Neck: Supple, No JVD  Mouth/Throat: Mucous membranes are normal. Mucosa are not pale or dry.  Cardiovascular: regular S1, S2 + ESM  Respiratory: Lungs clear to auscultation; no crepitations, no wheeze  Gastrointestinal:  Soft, Non-tender, + BS	  Musculoskeletal: Normal range of motion. No edema  Skin: Warm and dry. No cyanosis . No diaphoresis.  Neurologic: No tremor.  Psychiatric: Flat affect          LABS:                        8.8    6.66  )-----------( 195      ( 07 Jun 2022 07:10 )             28.4     06-07    139  |  99  |  29.2<H>  ----------------------------<  101<H>  4.0   |  28.0  |  2.91<H>    Ca    8.5<L>      07 Jun 2022 07:10  Phos  3.5     06-07  Mg     1.9     06-07    RADIOLOGY & ADDITIONAL STUDIES: (reviewed)  CXR was independently visualized/reviewed and demonstrated: Continued elevation of right hemidiaphragm.    Right basilar subsegmental atelectasis/trace right pleural effusion.      CARDIOLOGY TESTING:(reviewed)     ECG (Independent visualization): Paced    ECHOCARDIOGRAM :  Summary:   1. Left ventricular ejection fraction, by visual estimation, is 60 to   65%.   2. Normal global left ventricular systolic function.  3. Moderate to severe left atrial enlargement.   4. Abnormal septal motion consistent with post-operative status.   5. Mildly increased LV wall thickness.   6. Mild to moderately enlarged right atrium.   7. Moderate mitral valve regurgitation.   8. Thickening and calcification of the anterior and posterior mitral   valve leaflets.   9. Transmitral mean gradient is 13.4 mmHg at HR 89 bpm.  10. Mild-moderate tricuspid regurgitation.  11. 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 Gradients suggestive of normally   functioning prosthetic valve. No significant change from prior study   dated 11/2021.  12. Estimated pulmonary artery systolic pressure is 66.4 mmHg assuming a   right atrial pressure of 3 mmHg, which is consistent with severe   pulmonary hypertension.  13. Compared to prior TTE study dated 11/2021, no significant interval   changes have occurred.    MEDICATIONS:(reviewed)  Home Medications:    MEDICATIONS  (STANDING):  acetaminophen     Tablet .. 650 milliGRAM(s) Oral every 6 hours  aspirin  chewable 81 milliGRAM(s) Oral daily  chlorhexidine 2% Cloths 1 Application(s) Topical <User Schedule>  chlorhexidine 2% Cloths 1 Application(s) Topical <User Schedule>  dextrose 5%. 1000 milliLiter(s) (50 mL/Hr) IV Continuous <Continuous>  dextrose 5%. 1000 milliLiter(s) (100 mL/Hr) IV Continuous <Continuous>  dextrose 50% Injectable 12.5 Gram(s) IV Push once  dextrose 50% Injectable 25 Gram(s) IV Push once  dextrose 50% Injectable 25 Gram(s) IV Push once  fentaNYL   Patch  12 MICROgram(s)/Hr 1 Patch Transdermal every 72 hours  glucagon  Injectable 1 milliGRAM(s) IntraMuscular once  heparin   Injectable 5000 Unit(s) SubCutaneous every 8 hours  influenza  Vaccine (HIGH DOSE) 0.7 milliLiter(s) IntraMuscular once  isosorbide   mononitrate ER Tablet (IMDUR) 30 milliGRAM(s) Oral daily  melatonin 3 milliGRAM(s) Oral at bedtime  Nephro-pito 1 Tablet(s) Oral daily  NIFEdipine XL 90 milliGRAM(s) Oral daily  pantoprazole    Tablet 40 milliGRAM(s) Oral every 12 hours  piperacillin/tazobactam IVPB.. 3.375 Gram(s) IV Intermittent every 12 hours  polyethylene glycol 3350 17 Gram(s) Oral every 12 hours  senna 2 Tablet(s) Oral at bedtime  traZODone 100 milliGRAM(s) Oral at bedtime    MEDICATIONS  (PRN):  dextrose Oral Gel 15 Gram(s) Oral once PRN Blood Glucose LESS THAN 70 milliGRAM(s)/deciliter  HYDROmorphone  Injectable 0.5 milliGRAM(s) IV Push every 3 hours PRN Breakthrough pain  HYDROmorphone  Injectable 0.5 milliGRAM(s) IV Push every 3 hours PRN Moderate Pain (4 - 6)  HYDROmorphone  Injectable 0.25 milliGRAM(s) IV Push every 3 hours PRN Mild Pain (1 - 3)  sodium chloride 0.9% lock flush 10 milliLiter(s) IV Push every 1 hour PRN Pre/post blood products, medications, blood draw, and to maintain line patency  sodium chloride 0.9% lock flush 10 milliLiter(s) IV Push every 1 hour PRN Pre/post blood products, medications, blood draw, and to maintain line patency      ASSESSMENT AND PLAN:    88y Male with past medical history significant for ESRD on HD PAF off AC due to GIBs, leadless PPM, ILR in place, non obstructive CAD with normal EF, AS s/p TAVR, ESRD on HD, PAD s/p RLE fem-pop bypass, w/ revised LLE fem-bypass in march of 2021, complicated by LLE SFA and pop artery occlusion w/ DP and AT runoff s/p L fem to AT bypass w/ cryovein on 4/13/22 admitted 5/31 with fall and bleeding from leg. s/p multiple units of blood, and urgently taken to OR for operative ligation of bleeding cryovein fem-AT bypass. Patient with AV graft thrombosis- now s/p AKA and placement of R IJ permacatheter.    Cont present cardiac therapy, Volume status stable on HD  H/H stable. Off low dose Eliquis  BP stable  4.   No further cardiac intervention at present , please call us back as need it

## 2022-06-08 NOTE — PROGRESS NOTE ADULT - SUBJECTIVE AND OBJECTIVE BOX
Vascular Surgery Progress Note:  No acute overnight events. Patient afebrile, VSS. Pain well controlled.   Tolerating diet. Denies n/v/f/c/cp/sob.     Diet, Regular   Supplement Feeding Modality:  Oral  Nepro Cans or Servings Per Day:  1       Frequency:  Three Times a day (06-07-22 @ 10:56)      Scheduled Medications:   acetaminophen     Tablet .. 650 milliGRAM(s) Oral every 6 hours  aspirin  chewable 81 milliGRAM(s) Oral daily  chlorhexidine 2% Cloths 1 Application(s) Topical <User Schedule>  dextrose 5%. 1000 milliLiter(s) (50 mL/Hr) IV Continuous <Continuous>  dextrose 5%. 1000 milliLiter(s) (100 mL/Hr) IV Continuous <Continuous>  dextrose 50% Injectable 25 Gram(s) IV Push once  dextrose 50% Injectable 12.5 Gram(s) IV Push once  dextrose 50% Injectable 25 Gram(s) IV Push once  fentaNYL   Patch  12 MICROgram(s)/Hr 1 Patch Transdermal every 72 hours  glucagon  Injectable 1 milliGRAM(s) IntraMuscular once  heparin   Injectable 5000 Unit(s) SubCutaneous every 8 hours  influenza  Vaccine (HIGH DOSE) 0.7 milliLiter(s) IntraMuscular once  isosorbide   mononitrate ER Tablet (IMDUR) 30 milliGRAM(s) Oral daily  melatonin 3 milliGRAM(s) Oral at bedtime  Nephro-pito 1 Tablet(s) Oral daily  NIFEdipine XL 90 milliGRAM(s) Oral daily  pantoprazole    Tablet 40 milliGRAM(s) Oral every 12 hours  piperacillin/tazobactam IVPB.. 3.375 Gram(s) IV Intermittent every 12 hours  polyethylene glycol 3350 17 Gram(s) Oral every 12 hours  senna 2 Tablet(s) Oral at bedtime  traZODone 100 milliGRAM(s) Oral at bedtime    PRN Medications:  dextrose Oral Gel 15 Gram(s) Oral once PRN Blood Glucose LESS THAN 70 milliGRAM(s)/deciliter  HYDROmorphone  Injectable 0.25 milliGRAM(s) IV Push every 3 hours PRN Mild Pain (1 - 3)  sodium chloride 0.9% lock flush 10 milliLiter(s) IV Push every 1 hour PRN Pre/post blood products, medications, blood draw, and to maintain line patency      Objective:   T(F): 98 (06-07 @ 17:33), Max: 98.7 (06-07 @ 08:44)  HR: 75 (06-07 @ 17:33) (49 - 75)  BP: 124/52 (06-07 @ 17:33) (124/52 - 134/55)  RR: 18 (06-07 @ 17:33) (18 - 18)  SpO2: 92% (06-07 @ 17:33) (92% - 99%)      Physical Exam:   GEN: patient resting comfortably in bed, in no acute distress  RESP: respirations are unlabored, no accessory muscle use, no conversational dyspnea  CVS: RRR   Abdomen soft, non-tender, non-distended, no rebound tenderness / guarding  lt lower extremity s/p lt AKA dressing in place, dressing is C/D/I , yesterday was the first take down and  the sutule line looked healthy     I&O's    06-07 @ 07:01  -  06-08 @ 07:00  --------------------------------------------------------  IN:  Total IN: 0 mL    OUT:    Incontinent per Condom Catheter (mL): 20 mL  Total OUT: 20 mL    Total NET: -20 mL          LABS:                        9.4    6.78  )-----------( 249      ( 08 Jun 2022 06:39 )             30.1     06-08    139  |  101  |  40.8<H>  ----------------------------<  108<H>  4.5   |  23.0  |  4.15<H>    Ca    8.6      08 Jun 2022 06:39  Phos  4.3     06-08  Mg     2.1     06-08            MICROBIOLOGY:     Culture - Blood (collected 06-03 @ 17:02)  Source: .Blood Blood-Peripheral  Preliminary Report (06-05 @ 19:01):    No growth at 48 hours    Culture - Blood (collected 06-03 @ 15:48)  Source: .Blood Blood-Peripheral  Preliminary Report (06-05 @ 17:00):    No growth at 48 hours

## 2022-06-09 NOTE — DISCHARGE NOTE PROVIDER - NSDCCPCAREPLAN_GEN_ALL_CORE_FT
PRINCIPAL DISCHARGE DIAGNOSIS  Diagnosis: Hemorrhagic shock  Assessment and Plan of Treatment:       SECONDARY DISCHARGE DIAGNOSES  Diagnosis: Hypotension  Assessment and Plan of Treatment:

## 2022-06-09 NOTE — PROGRESS NOTE ADULT - ASSESSMENT
86yo M POD 6 lt aka. pt tolerated procedure well. pt tolerated dialysis via permacath    Plan:    - cardiology called as per daughter request  - no new recommendations since H and H stable and BP stable   - Diet recommended per Speech and Swallow  specialist--  puree and no liquids to avoid  aspiration but daughter requested  regular   -Dressing with xeroform, gauze, abd, kerlix, ace,   - Palliative  also onboard for his care  - f/u PT recommendations recommeded home VS Val , familly wants home   - working of his diascharge  - Daily change of dressing   -Patient can be discharge from the vascular surgery perspective 88yo M POD 6 lt aka. pt tolerated procedure well. pt tolerated dialysis via permacath    Plan:    - cardiology called as per daughter request  - no new recommendations since H and H stable and BP stable   - Diet recommended per Speech and Swallow  specialist--  puree and no liquids to avoid  aspiration but daughter requested  regular   -Dressing with xeroform, gauze, abd, kerlix, ace,   - Palliative  also onboard for his care  - f/u PT recommendations recommeded home VS Val , familly wants home   - working of his diascharge  - Daily change of dressing   -Patient can be discharge from the vascular surgery perspective -VAscular PA Basilio already spoke to his daughter Vanessa about the discharge decision.

## 2022-06-09 NOTE — DISCHARGE NOTE PROVIDER - NSDCMRMEDTOKEN_GEN_ALL_CORE_FT
acetaminophen 325 mg oral tablet: 3 tab(s) orally every 6 hours, As needed, Mild Pain (1 - 3)  aspirin 81 mg oral tablet, chewable: 1 tab(s) orally once a day  atorvastatin 80 mg oral tablet: 1 tab(s) orally once a day (at bedtime)  cadexomer iodine 0.9% topical gel: 1 application topically 2 times a day  fentaNYL 12 mcg/hr transdermal film, extended release: 1 patch transdermal every 72 hours MDD:1 patch every 3 days  hydrocortisone 25 mg rectal suppository: 1 suppository(ies) rectal every 12 hours  isosorbide mononitrate 30 mg oral tablet, extended release: 1 tab(s) orally once a day  Melatonin 3 mg oral tablet: 1 tab(s) orally once a day (at bedtime)  Nephro-Thomas oral tablet: 1 tab(s) orally once a day  NIFEdipine 90 mg oral tablet, extended release: 1 tab(s) orally once a day  pantoprazole 40 mg oral delayed release tablet: 1 tab(s) orally 2 times a day  polyethylene glycol 3350 oral powder for reconstitution: 17 gram(s) orally every 12 hours  senna oral tablet: 2 tab(s) orally once a day (at bedtime)  sevelamer hydrochloride 800 mg oral tablet: 1 tab(s) orally 3 times a day  Super B Complex: 1 tab(s) orally once a day  tamsulosin 0.4 mg oral capsule: 1 cap(s) orally once a day (at bedtime)  torsemide 20 mg oral tablet: 1 tab(s) orally on non HD days  traZODone 50 mg oral tablet: 1 tab(s) orally once a day (at bedtime)   acetaminophen 325 mg oral tablet: 3 tab(s) orally every 6 hours, As needed, Mild Pain (1 - 3)  aspirin 81 mg oral tablet, chewable: 1 tab(s) orally once a day  atorvastatin 80 mg oral tablet: 1 tab(s) orally once a day (at bedtime)  cadexomer iodine 0.9% topical gel: 1 application topically 2 times a day  fentaNYL 12 mcg/hr transdermal film, extended release: 1 patch transdermal every 72 hours MDD:1 patch every 3 days  hydrocortisone 25 mg rectal suppository: 1 suppository(ies) rectal every 12 hours  isosorbide mononitrate 30 mg oral tablet, extended release: 1 tab(s) orally once a day  Melatonin 3 mg oral tablet: 1 tab(s) orally once a day (at bedtime)  Nephro-Thomas oral tablet: 1 tab(s) orally once a day  NIFEdipine 90 mg oral tablet, extended release: 1 tab(s) orally once a day  pantoprazole 40 mg oral delayed release tablet: 1 tab(s) orally 2 times a day  polyethylene glycol 3350 oral powder for reconstitution: 17 gram(s) orally every 12 hours  QUEtiapine 25 mg oral tablet: 1 tab(s) orally once a day (at bedtime)  senna oral tablet: 2 tab(s) orally once a day (at bedtime)  sevelamer hydrochloride 800 mg oral tablet: 1 tab(s) orally 3 times a day  Super B Complex: 1 tab(s) orally once a day  tamsulosin 0.4 mg oral capsule: 1 cap(s) orally once a day (at bedtime)  torsemide 20 mg oral tablet: 1 tab(s) orally on non HD days  traZODone 50 mg oral tablet: 1 tab(s) orally once a day (at bedtime)   acetaminophen 325 mg oral tablet: 3 tab(s) orally every 6 hours, As needed, Mild Pain (1 - 3)  amoxicillin-clavulanate 250 mg-125 mg oral tablet: 1 tab(s) orally 2 times a day   aspirin 81 mg oral tablet, chewable: 1 tab(s) orally once a day  atorvastatin 80 mg oral tablet: 1 tab(s) orally once a day (at bedtime)  cadexomer iodine 0.9% topical gel: 1 application topically 2 times a day  fentaNYL 12 mcg/hr transdermal film, extended release: 1 patch transdermal every 72 hours MDD:1 patch every 3 days  hydrocortisone 25 mg rectal suppository: 1 suppository(ies) rectal every 12 hours  isosorbide mononitrate 30 mg oral tablet, extended release: 1 tab(s) orally once a day  Melatonin 3 mg oral tablet: 1 tab(s) orally once a day (at bedtime)  Nephro-Thomas oral tablet: 1 tab(s) orally once a day  NIFEdipine 90 mg oral tablet, extended release: 1 tab(s) orally once a day  pantoprazole 40 mg oral delayed release tablet: 1 tab(s) orally 2 times a day  polyethylene glycol 3350 oral powder for reconstitution: 17 gram(s) orally every 12 hours  QUEtiapine 25 mg oral tablet: 1 tab(s) orally once a day (at bedtime)  senna oral tablet: 2 tab(s) orally once a day (at bedtime)  sevelamer hydrochloride 800 mg oral tablet: 1 tab(s) orally 3 times a day  Super B Complex: 1 tab(s) orally once a day  tamsulosin 0.4 mg oral capsule: 1 cap(s) orally once a day (at bedtime)  torsemide 20 mg oral tablet: 1 tab(s) orally on non HD days  traZODone 50 mg oral tablet: 1 tab(s) orally once a day (at bedtime)

## 2022-06-09 NOTE — PROGRESS NOTE ADULT - ASSESSMENT
ESRD on HD   POD 5 , s/p Left AKA  Anemia of CKD      HD tomorrow- UF as tolerated  Hb low- AMY with HD

## 2022-06-09 NOTE — DISCHARGE NOTE PROVIDER - NSDCHHNEEDSERVICEOTHER_GEN_ALL_CORE_FT
Home PT for help with transfer  Home PT for help with transfer. Dressing changes to the Left AKA ([place] dressings), with periodic wound eval.

## 2022-06-09 NOTE — DISCHARGE NOTE NURSING/CASE MANAGEMENT/SOCIAL WORK - NSDCPEFALRISK_GEN_ALL_CORE
For information on Fall & Injury Prevention, visit: [email or web address] OR  [email or web address] OR  https://www.cdc.gov/steadi/patient.html

## 2022-06-09 NOTE — PROGRESS NOTE ADULT - SUBJECTIVE AND OBJECTIVE BOX
[facility] DIVISION OF KIDNEY DISEASES AND HYPERTENSION -- FOLLOW UP NOTE  --------------------------------------------------------------------------------  Chief Complaint:  ESRD    24 hour events/subjective:  HD in AM      PAST HISTORY  --------------------------------------------------------------------------------  No significant changes to PMH, PSH, FHx, SHx, unless otherwise noted    ALLERGIES & MEDICATIONS  --------------------------------------------------------------------------------  Allergies    Plavix (Hives)  Toprol-XL (Rash)    Intolerances      Standing Inpatient Medications  acetaminophen     Tablet .. 650 milliGRAM(s) Oral every 6 hours  aspirin  chewable 81 milliGRAM(s) Oral daily  chlorhexidine 2% Cloths 1 Application(s) Topical <User Schedule>  dextrose 5%. 1000 milliLiter(s) IV Continuous <Continuous>  dextrose 5%. 1000 milliLiter(s) IV Continuous <Continuous>  dextrose 50% Injectable 25 Gram(s) IV Push once  dextrose 50% Injectable 12.5 Gram(s) IV Push once  dextrose 50% Injectable 25 Gram(s) IV Push once  epoetin juan-epbx (RETACRIT) Injectable 86377 Unit(s) IV Push <User Schedule>  fentaNYL   Patch  12 MICROgram(s)/Hr 1 Patch Transdermal every 72 hours  glucagon  Injectable 1 milliGRAM(s) IntraMuscular once  heparin   Injectable 5000 Unit(s) SubCutaneous every 8 hours  influenza  Vaccine (HIGH DOSE) 0.7 milliLiter(s) IntraMuscular once  isosorbide   mononitrate ER Tablet (IMDUR) 30 milliGRAM(s) Oral daily  melatonin 3 milliGRAM(s) Oral at bedtime  Nephro-pito 1 Tablet(s) Oral daily  NIFEdipine XL 90 milliGRAM(s) Oral daily  pantoprazole    Tablet 40 milliGRAM(s) Oral every 12 hours  polyethylene glycol 3350 17 Gram(s) Oral every 12 hours  traZODone 100 milliGRAM(s) Oral at bedtime    PRN Inpatient Medications  dextrose Oral Gel 15 Gram(s) Oral once PRN  HYDROmorphone  Injectable 0.25 milliGRAM(s) IV Push every 3 hours PRN  sodium chloride 0.9% lock flush 10 milliLiter(s) IV Push every 1 hour PRN      REVIEW OF SYSTEMS  --------------------------------------------------------------------------------  Gen: No weight changes, fatigue, fevers/chills, weakness  Skin: No rashes  Head/Eyes/Ears/Mouth: No headache; Normal hearing; Normal vision w/o blurriness; No sinus pain/discomfort, sore throat  Respiratory: No dyspnea, cough, wheezing, hemoptysis  CV: No chest pain, PND, orthopnea  GI: No abdominal pain, diarrhea, constipation, nausea, vomiting, melena, hematochezia  : No increased frequency, dysuria, hematuria, nocturia  MSK: No joint pain/swelling; no back pain; no edema  Neuro: No dizziness/lightheadedness, weakness, seizures, numbness, tingling  Heme: No easy bruising or bleeding  Endo: No heat/cold intolerance  Psych: No significant nervousness, anxiety, stress, depression    All other systems were reviewed and are negative, except as noted.    VITALS/PHYSICAL EXAM  --------------------------------------------------------------------------------  T(C): 36.6 (06-09-22 @ 05:00), Max: 37.1 (06-08-22 @ 22:23)  HR: 70 (06-09-22 @ 05:00) (55 - 70)  BP: 140/64 (06-08-22 @ 22:23) (129/52 - 152/77)  RR: 18 (06-09-22 @ 05:00) (17 - 19)  SpO2: 100% (06-09-22 @ 05:00) (97% - 100%)  Wt(kg): --        06-08-22 @ 07:01  -  06-09-22 @ 07:00  --------------------------------------------------------  IN: 800 mL / OUT: 1800 mL / NET: -1000 mL      Physical Exam:  	Gen: NAD  	HEENT: PERRL, supple neck,  	Pulm: CTA B/L  	CV: RRR, S1S2; no rub  	Abd: +BS, soft, nontender  	UE: Warm  	LE: Warm, + edema  	Neuro: No focal deficits  	Psych: Normal affect and mood  	Skin: Warm, without rashes  	Vascular access: AVF  LABS/STUDIES  --------------------------------------------------------------------------------              9.7    6.90  >-----------<  256      [06-09-22 @ 08:16]              32.1     143  |  102  |  24.7  ----------------------------<  89      [06-09-22 @ 08:16]  3.8   |  27.0  |  2.92        Ca     8.8     [06-09-22 @ 08:16]      Mg     2.1     [06-09-22 @ 08:16]      Phos  3.6     [06-09-22 @ 08:16]            Creatinine Trend:  SCr 2.92 [06-09 @ 08:16]  SCr 4.15 [06-08 @ 06:39]  SCr 2.91 [06-07 @ 07:10]  SCr 3.98 [06-06 @ 07:00]  SCr 3.10 [06-05 @ 07:14]    Urinalysis - [06-03-22 @ 18:57]      Color Yellow / Appearance Clear / SG 1.010 / pH 6.0      Gluc Negative / Ketone Negative  / Bili Small / Urobili Negative       Blood Negative / Protein 100 / Leuk Est Trace / Nitrite Negative      RBC 0-2 / WBC 11-25 / Hyaline  / Gran  / Sq Epi  / Non Sq Epi Occasional / Bacteria Occasional      Iron 43, TIBC 249, %sat 17      [07-13-21 @ 16:07]  Ferritin 498      [07-13-21 @ 16:07]  HbA1c 4.9      [08-09-18 @ 05:54]  TSH 5.55      [06-03-22 @ 15:44]    HBsAg Nonreact      [05-16-22 @ 11:20]

## 2022-06-09 NOTE — DISCHARGE NOTE PROVIDER - NSDCCPTREATMENT_GEN_ALL_CORE_FT
PRINCIPAL PROCEDURE  Procedure: Ligation of artery of extremity  Findings and Treatment: Left femoral to anterior tibial bypass graaft (cryovein)      SECONDARY PROCEDURE  Procedure: Above knee amputation  Findings and Treatment:     Procedure: Placement of venous hemodialysis catheter  Findings and Treatment:

## 2022-06-09 NOTE — PROGRESS NOTE ADULT - SUBJECTIVE AND OBJECTIVE BOX
Patient was going to be discharged today and have outpatient follow up for his thrombosed A-V fistula   Bus Mr King daughter  Vanessa, has requested to have it done in this admission  Dr. Abeba Sorenson  is agreeable to do ir next Sunday June 12, 22 - patient is been book for a thrombectomy possible revision

## 2022-06-09 NOTE — H&P PST ADULT - NECK DETAILS
6/9/2022         RE: Lorraine Painting  [address] [place] [address]        Dear Colleague,    Thank you for referring your patient, Lorraine Painting, to the [facility]. Please see a copy of my visit note below.    Lorraine is a 64 year old who is being evaluated via a billable video visit.      How would you like to obtain your AVS? MyChart  If the video visit is dropped, the invitation should be resent by: Text to cell phone: [phone]  Will anyone else be joining your video visit? Ayanna AN    Video Start Time: 11:11 AM  Video-Visit Details    Type of service:  Video Visit    Video End Time:11:36 AM    Originating Location (pt. Location): Home    Distant Location (provider location):  [facility]     Platform used for Video Visit: [employer]    Oncology initial visit:  Date on this visit: 6/9/2022    Lorraine Painting  is referred by Dr.Julie Afua Juan* for an oncology consultation. She requires evaluation for new diagnosis of liver mass.    Primary Physician: No Ref-Primary, Physician     History Of Present Illness:  Ms. Painting is a 64 year old female who presents with new diagnosis of liver mass.    This is a video visit.  I also reviewed notes from Dr. Mckay from hepatology.    Dotatate has a history of hepatitis C, genotype Ia which was first diagnosed in 2019.  She was treated with Epclusa in 2020 and achieved sustained virologic response.    On 4/2/2022 she presented with right-sided abdominal pain.  Work-up at that time showed a right hepatic lobe mass about 10 cm in size.     This was concerning for HCC or biphenotypic HCC/cholangiocarcinoma versus angiosarcoma. She had an MRI on 4/11/2022 which showed a large mass replacing much of segment 6 and 7 and involving segments 5 and 8.  It measures approximately 7.6 x 9.8 cm.  It shows heterogenous arterial phase hyperenhancement with areas of progressive enhancement related washout.  There is intermediate tumoral signal on T2WI with small fluid intensity cystic spaces. Associated diffusion restriction. The mass abuts and distorts the right hepatic vein, and the posterior division of the right portal vein appears to be occluded. The satellite lesions extending anteriorly to involve segments 5 and 8.  This mass shows features of hepatocellular carcinoma but she does not have cirrhosis so LIRADS criteria do not apply.  Spleen is not enlarged.  No upper abdominal lymphadenopathy.  Lung bases appear clear.  Alpha-fetoprotein was 420.  CA 19-9 was normal.    bone scan was negative for any evidence of metastatic disease on 6/6/2022  CT chest showed scattered sub-4 mm lung nodules were seen.  It also showed left thyroid lobe nodule for which thyroid ultrasound was recommended and this has been ordered by Dr. Mckay.     There is plan to do liver biopsy to determine the definitive diagnosis.      She has been noticing abdominal discomfort/bloating sensation over the last couple of weeks.  The pain is not very bad.  She takes over-the-counter pain medication and tells me that she does not require any stronger pain medication right now.  She sometimes has constipation and sometimes has diarrhea.  No nausea or vomiting.  She does not believe that she has lost weight.  She feels a little tired but remains decently functional.  She has mild dyspnea on exertion.  No neuropathy.  No bleeding.  No new swellings.  She mentions that she is planning to transfer her care to [facility].  Currently the biopsy is a scheduled here for 6/13/2022.        ECOG 1    ROS:  A comprehensive ROS was otherwise neg      Past Medical/Surgical History:  No past medical history on file.  Past Surgical History:   Procedure Laterality Date     FOOT SURGERY Left    HPV related vulva cancer in 2019-  1. 07/02/2019: anterior radical vulvectomy, distal urethral resection and bilateral inguinofemoral lymph node dissection per Dr. Hargrove  (gyn onc) and Dr. Louis (urology)     2. (09/17/2019 - 10/31/2019) - Initiated concurrent chemo-radiation with weekly cisplatin.  Completed 6 cycles weekly cisplatin on 10/29/2019       EBRT of 5040 cGy to the pelvic lymph nodes and vulva with additional 1080 cGy boost to the urethra without bolus was done, for a total dose delivered to the urethra of 6120 cGy.    Cancer History:   As above    Allergies:  Allergies as of 06/09/2022 - Reviewed 06/09/2022   Allergen Reaction Noted     New medication allergy  11/29/2010     Venlafaxine Other (See Comments) 08/15/2015     Current Medications:  Current Outpatient Medications   Medication Sig Dispense Refill     naproxen (NAPROSYN) 500 MG tablet Take 1 tablet (500 mg) by mouth 2 times daily as needed for moderate pain (with food) (Patient not taking: No sig reported) 30 tablet 1      Family History:  No family history on file.   Dad had bone cancer  Sister with Uterine cancer  Paternal aunt with breast cancer  Social History:  Social History     Socioeconomic History     Marital status:      Spouse name: Not on file     Number of children: Not on file     Years of education: Not on file     Highest education level: Not on file   Occupational History     Not on file   Tobacco Use     Smoking status: Former Smoker     Packs/day: 1.00     Years: 30.00     Pack years: 30.00     Types: Cigarettes     Smokeless tobacco: Never Used   Substance and Sexual Activity     Alcohol use: No     Drug use: Yes     Comment: marijuana as a teen      Sexual activity: Not Currently   Other Topics Concern     Parent/sibling w/ CABG, MI or angioplasty before 65F 55M? Not Asked   Social History Narrative     Not on file     Social Determinants of Health     Financial Resource Strain: Not on file   Food Insecurity: Not on file   Transportation Needs: Not on file   Physical Activity: Not on file   Stress: Not on file   Social Connections: Not on file   Intimate Partner Violence: Not on  file   Housing Stability: Not on file     Has been a chronic smoker but quit 2 months ago. Very rare etoh use.      Physical Exam:  There were no vitals taken for this visit.   Wt Readings from Last 4 Encounters:   07/26/17 63 kg (139 lb)   07/12/17 63 kg (139 lb)   07/11/17 63 kg (139 lb)   11/29/10 56.6 kg (124 lb 12.8 oz)         Constitutional.  Does not seem to be in any acute distress.  Eyes.  No redness or discharge noted.  Respiratory.  Speaking in full sentences.  Breathing seems comfortable without any accessory use of muscles.    Skin.  Visualized his skin does not show any obvious rashes.  Musculoskeletal.  Range of motion for visualized areas is intact.  Neurological.  Alert and oriented x3.  Psychiatric.  Mood, mentation and affect are normal.  Decision making capacity is intact.      The rest of a comprehensive physical examination is deferred due to Public [facility] Emergency video visit restrictions.    Laboratory/Imaging Studies      Reviewed  4/28/2022  CBC shows hemoglobin 14.  WBC 6.4.  Platelets 102.  CMP showed normal kidney function.  Alkaline phosphatase 177.  Bilirubin 0.9.  Normal AST and ALT.    Alpha-fetoprotein 420.  CA 19-9 was normal.    Reviewed imaging and mentioned above.    ASSESSMENT/PLAN:    She has a large liver mass measuring about 10 cm replacing much of segment 6 and 7 and involving segments 5 and 8.  This is likely HCC or biphenotypic HCC/cholangiocarcinoma versus angiosarcoma.   She has a history of hepatitis C but she does not have a history of cirrhosis.  No evidence of metastatic disease on bone scan or CT chest although there is some very tiny lung nodules.  These are indeterminate.      We discussed the situation in detail.  We need a tissue diagnosis to confirm what exactly we are dealing with.  The treatment would depend upon the final diagnosis.  Currently her biopsy is a scheduled for 6/13/2022 but she has decided to transfer her care to [facility].  She wants me to  cancel the biopsy here.  We discussed that in case she notices worsening pain or abdominal distention, then she should go to the hospital right away.  She understands that.  I will send a message to cancel the biopsy as per patient request.      Lung nodule.  These are very tiny and indeterminate.  These will need serial monitoring.    Going forward she will follow at [facility].  I wished her all the best.    All of her questions were answered to her satisfaction.  She knows to contact me if she has any questions.            Again, thank you for allowing me to participate in the care of your patient.      Sincerely,    Joann Rodriguez MD     carotid bruit R/no JVD/normal/supple

## 2022-06-09 NOTE — DISCHARGE NOTE PROVIDER - CARE PROVIDER_API CALL
Siva Winston)  Surgery; Vascular Surgery  [address], [address]st [place] [address]  Phone: [phone]  Fax: [phone]  Follow Up Time: 2 weeks

## 2022-06-09 NOTE — PROGRESS NOTE ADULT - SUBJECTIVE AND OBJECTIVE BOX
Vascular Surgery Progress Note:  No acute overnight events. Patient afebrile, VSS. Pain well controlled. Tolerating diet. Denies n/v/f/c/cp/sob.     Diet, Regular:   Supplement Feeding Modality:  Oral  Nepro Cans or Servings Per Day:  1       Frequency:  Three Times a day (06-07-22 @ 10:56)      Scheduled Medications:   acetaminophen     Tablet .. 650 milliGRAM(s) Oral every 6 hours  aspirin  chewable 81 milliGRAM(s) Oral daily  chlorhexidine 2% Cloths 1 Application(s) Topical <User Schedule>  dextrose 5%. 1000 milliLiter(s) (100 mL/Hr) IV Continuous <Continuous>  dextrose 5%. 1000 milliLiter(s) (50 mL/Hr) IV Continuous <Continuous>  dextrose 50% Injectable 25 Gram(s) IV Push once  dextrose 50% Injectable 12.5 Gram(s) IV Push once  dextrose 50% Injectable 25 Gram(s) IV Push once  epoetin juan-epbx (RETACRIT) Injectable 83248 Unit(s) IV Push <User Schedule>  fentaNYL   Patch  12 MICROgram(s)/Hr 1 Patch Transdermal every 72 hours  glucagon  Injectable 1 milliGRAM(s) IntraMuscular once  heparin   Injectable 5000 Unit(s) SubCutaneous every 8 hours  influenza  Vaccine (HIGH DOSE) 0.7 milliLiter(s) IntraMuscular once  isosorbide   mononitrate ER Tablet (IMDUR) 30 milliGRAM(s) Oral daily  melatonin 3 milliGRAM(s) Oral at bedtime  Nephro-pito 1 Tablet(s) Oral daily  NIFEdipine XL 90 milliGRAM(s) Oral daily  pantoprazole    Tablet 40 milliGRAM(s) Oral every 12 hours  polyethylene glycol 3350 17 Gram(s) Oral every 12 hours  traZODone 100 milliGRAM(s) Oral at bedtime    PRN Medications:  dextrose Oral Gel 15 Gram(s) Oral once PRN Blood Glucose LESS THAN 70 milliGRAM(s)/deciliter  HYDROmorphone  Injectable 0.25 milliGRAM(s) IV Push every 3 hours PRN Mild Pain (1 - 3)  sodium chloride 0.9% lock flush 10 milliLiter(s) IV Push every 1 hour PRN Pre/post blood products, medications, blood draw, and to maintain line patency      Objective:   T(F): 97.8 (06-09 @ 05:00), Max: 98.7 (06-08 @ 22:23)  HR: 70 (06-09 @ 05:00) (55 - 70)  BP: 140/64 (06-08 @ 22:23) (129/52 - 152/77)  RR: 18 (06-09 @ 05:00) (17 - 19)  SpO2: 100% (06-09 @ 05:00) (97% - 100%)      Physical Exam:   GEN: patient resting comfortably in bed, in no acute distress  RESP: respirations are unlabored, no accessory muscle use, no conversational dyspnea  CVS: RRR  GI: Abdomen soft, non-tender, non-distended, no rebound tenderness / guarding-  lt lower extremity s/p lt AKA dressing in place, dressing is C/D/I , yesterday was the first take down and  the sutule line looked healthy     I&O's    06-08 @ 07:01  -  06-09 @ 07:00  --------------------------------------------------------  IN:    Other (mL): 800 mL  Total IN: 800 mL    OUT:    Other (mL): 1800 mL  Total OUT: 1800 mL    Total NET: -1000 mL          LABS:                        9.4    6.78  )-----------( 249      ( 08 Jun 2022 06:39 )             30.1     06-08    139  |  101  |  40.8<H>  ----------------------------<  108<H>  4.5   |  23.0  |  4.15<H>    Ca    8.6      08 Jun 2022 06:39  Phos  4.3     06-08  Mg     2.1     06-08            MICROBIOLOGY:     Culture - Blood (collected 06-03 @ 17:02)  Source: .Blood Blood-Peripheral  Final Report (06-08 @ 19:00):    No growth at 5 days.    Culture - Blood (collected 06-03 @ 15:48)  Source: .Blood Blood-Peripheral  Final Report (06-08 @ 17:00):    No growth at 5 days.

## 2022-06-09 NOTE — DISCHARGE NOTE PROVIDER - NSDCFUSCHEDAPPT_GEN_ALL_CORE_FT
[facility]  [facility] [address]  Scheduled Appointment: 06/15/2022     [facility]  [facility] [address]  Scheduled Appointment: 06/15/2022    [facility]  [facility] [address]  Scheduled Appointment: 07/20/2022     [facility]  [facility] [address]  Scheduled Appointment: 07/20/2022

## 2022-06-09 NOTE — CHART NOTE - NSCHARTNOTEFT_GEN_A_CORE
Patient was going to be discharged today and have outpatient follow up for his thrombosed A-V fistula   But Mr Fernando daughter  Vanessa, has requested to have it done in this admission.  Dr. Abeba Sorenson  is agreeable to do ir next Sunday June 12, 22 - patient is been booked for a thrombectomy possible revision of  L A-V fistula

## 2022-06-09 NOTE — DISCHARGE NOTE NURSING/CASE MANAGEMENT/SOCIAL WORK - PATIENT PORTAL LINK FT
You can access the FollowMyHealth Patient Portal offered by [facility] by registering at the following website: [email or web address]. By joining [employer]’s FollowMyHealth portal, you will also be able to view your health information using other applications (apps) compatible with our system.

## 2022-06-09 NOTE — DISCHARGE NOTE PROVIDER - HOSPITAL COURSE
88 year old male well known to our service, presented on 5/31/2022 with hemorrhagic shock due bleeding from his distal surgical site.  Patient with multiple revascularizing efforts to the left extremity due to severe PAD.  The last intervention was on 4/14, 4/20... Revision of Left Femoral to AT bypass with cryovein then thrombectomy of the conduit.  The patient was receiving VNS services at home for his distal tibial wound, the ultimate site of acute hemorrhage.  On presentation to the ED, the patient was emergently taken to the OR and the bypass conduit was ligated.  On 6/3 an AKA was performed with to persistent ischemia and pain the the left foot.  During the hospital course, the patient's left AVF thrombosed and a temporary access was placed for HD.  The temporary access was then changed to a Permacath. Currently the Left AKA site is intact with no wound compromise.  Permacath is functioning well.  Aside from periodic bouts of delirium at night, patient has been progressing well and is currently deemed surgically stable.  Plan for discharge with office follow up to revise Left AVF as an outpatient. 88 year old male well known to our service, presented on 5/31/2022 with hemorrhagic shock due bleeding from his distal surgical site.  Patient with multiple revascularizing efforts to the left extremity due to severe PAD.  The last intervention was on 4/14, 4/20... Revision of Left Femoral to AT bypass with cryovein then thrombectomy of the conduit.  The patient was receiving VNS services at home for his distal tibial wound, the ultimate site of acute hemorrhage.  On presentation to the ED, the patient was emergently taken to the OR and the bypass conduit was ligated.  On 6/3 an AKA was performed with to persistent ischemia and pain the the left foot.  During the hospital course, the patient's left AVF thrombosed and a temporary access was placed for HD.  The temporary access was then changed to a Permacath. Currently the Left AKA site is intact with no wound compromise.  Permacath is functioning well. Patient experienced Fever of unknown origin 6/14, work up thus far has been inconclusive. Aside from periodic bouts of delirium at night, patient has been progressing well and is currently deemed surgically stable.  Plan for discharge with office follow up for amputation site staple removal and to plan possible revision of Left AVF as an outpatient.

## 2022-06-09 NOTE — CHART NOTE - NSCHARTNOTEFT_GEN_A_CORE
Palliative care NP Note:  Patient doing well; progress notes and labs reviewed. Discharge planning to go home later today. MOLST DNR/DNI completed by Dr. Michelle. Palliative Team will be signing off Lester's case today.  Please reconsult if goals or condition changes.  Iliana Warren NP Palliative care NP Note:  Patient doing well; progress notes and labs reviewed. Discharge planning was to go home later today. Discharge postponed until tomorrow  MOLST DNR/DNI completed by Dr. Michelle. Palliative Team will be signing off Lester's case today. Please reconsult if goals or condition changes.  Iliana Warren NP

## 2022-06-10 NOTE — CONSULT NOTE ADULT - CONSULT REASON
ESRD on HD
Hemorrhagic shock
" high karnofsky score, goals of care"
Anemia
Functional deficits s/p AKA

## 2022-06-10 NOTE — PROGRESS NOTE ADULT - SUBJECTIVE AND OBJECTIVE BOX
Vascular Surgery Progress Note:  Patient was confused overnight and  yelling , he was placed in 1 to 1 to redirect him    Patient afebrile, VSS. Pain well controlled. Tolerating diet. Denies n/v/f/c/cp/sob.     Diet, Regular:   Supplement Feeding Modality:  Oral  Nepro Cans or Servings Per Day:  1       Frequency:  Three Times a day (06-07-22 @ 10:56)      Scheduled Medications:   acetaminophen     Tablet .. 650 milliGRAM(s) Oral every 6 hours  aspirin  chewable 81 milliGRAM(s) Oral daily  atorvastatin 80 milliGRAM(s) Oral at bedtime  chlorhexidine 2% Cloths 1 Application(s) Topical <User Schedule>  dextrose 5%. 1000 milliLiter(s) (50 mL/Hr) IV Continuous <Continuous>  dextrose 5%. 1000 milliLiter(s) (100 mL/Hr) IV Continuous <Continuous>  dextrose 50% Injectable 25 Gram(s) IV Push once  dextrose 50% Injectable 12.5 Gram(s) IV Push once  dextrose 50% Injectable 25 Gram(s) IV Push once  epoetin juan-epbx (RETACRIT) Injectable 01657 Unit(s) IV Push <User Schedule>  fentaNYL   Patch  12 MICROgram(s)/Hr 1 Patch Transdermal every 72 hours  glucagon  Injectable 1 milliGRAM(s) IntraMuscular once  heparin   Injectable 5000 Unit(s) SubCutaneous every 8 hours  influenza  Vaccine (HIGH DOSE) 0.7 milliLiter(s) IntraMuscular once  isosorbide   mononitrate ER Tablet (IMDUR) 30 milliGRAM(s) Oral daily  melatonin 3 milliGRAM(s) Oral at bedtime  Nephro-pito 1 Tablet(s) Oral daily  NIFEdipine XL 90 milliGRAM(s) Oral daily  pantoprazole    Tablet 40 milliGRAM(s) Oral every 12 hours  polyethylene glycol 3350 17 Gram(s) Oral every 12 hours  sevelamer carbonate 800 milliGRAM(s) Oral three times a day with meals  traZODone 100 milliGRAM(s) Oral at bedtime    PRN Medications:  dextrose Oral Gel 15 Gram(s) Oral once PRN Blood Glucose LESS THAN 70 milliGRAM(s)/deciliter  HYDROmorphone  Injectable 0.25 milliGRAM(s) IV Push every 3 hours PRN Mild Pain (1 - 3)  sodium chloride 0.9% lock flush 10 milliLiter(s) IV Push every 1 hour PRN Pre/post blood products, medications, blood draw, and to maintain line patency      Objective:   T(F): 97.9 (06-10 @ 14:45), Max: 98.9 (06-09 @ 21:52)  HR: 63 (06-10 @ 14:45) (58 - 73)  BP: 145/62 (06-10 @ 14:45) (145/62 - 165/64)  RR: 19 (06-10 @ 14:45) (18 - 19)  SpO2: 100% (06-10 @ 14:45) (97% - 100%)      Physical Exam:   GEN: patient resting comfortably in bed, in no acute distress  RESP: respirations are unlabored, no accessory muscle use, no conversational dyspnea  CVS: RRR  GI: Abdomen soft, non-tender, non-distended, no rebound tenderness / guarding  lt lower extremity s/p lt AKA dressing in place, dressing is C/D/I , yesterday was the first take down and  the sutule line looked healthy     I&O's    06-10 @ 07:01  -  06-10 @ 15:52  --------------------------------------------------------  IN:  Total IN: 0 mL    OUT:    Other (mL): 1000 mL  Total OUT: 1000 mL    Total NET: -1000 mL          LABS:                        9.7    6.90  )-----------( 256      ( 09 Jun 2022 08:16 )             32.1     06-09    143  |  102  |  24.7<H>  ----------------------------<  89  3.8   |  27.0  |  2.92<H>    Ca    8.8      09 Jun 2022 08:16  Phos  3.6     06-09  Mg     2.1     06-09            MICROBIOLOGY:     Culture - Blood (collected 06-03 @ 17:02)  Source: .Blood Blood-Peripheral  Final Report (06-08 @ 19:00):    No growth at 5 days.        PATHOLOGY:       Vascular Surgery Progress Note:  Patient was confused overnight and  yelling , he was placed in 1 to 1 to redirect him    Patient afebrile, VSS. Pain well controlled. Tolerating diet. Denies n/v/f/c/cp/sob.     Diet, Regular:   Supplement Feeding Modality:  Oral  Nepro Cans or Servings Per Day:  1       Frequency:  Three Times a day (06-07-22 @ 10:56)      Scheduled Medications:   acetaminophen     Tablet .. 650 milliGRAM(s) Oral every 6 hours  aspirin  chewable 81 milliGRAM(s) Oral daily  atorvastatin 80 milliGRAM(s) Oral at bedtime  chlorhexidine 2% Cloths 1 Application(s) Topical <User Schedule>  dextrose 5%. 1000 milliLiter(s) (50 mL/Hr) IV Continuous <Continuous>  dextrose 5%. 1000 milliLiter(s) (100 mL/Hr) IV Continuous <Continuous>  dextrose 50% Injectable 25 Gram(s) IV Push once  dextrose 50% Injectable 12.5 Gram(s) IV Push once  dextrose 50% Injectable 25 Gram(s) IV Push once  epoetin juan-epbx (RETACRIT) Injectable 86258 Unit(s) IV Push <User Schedule>  fentaNYL   Patch  12 MICROgram(s)/Hr 1 Patch Transdermal every 72 hours  glucagon  Injectable 1 milliGRAM(s) IntraMuscular once  heparin   Injectable 5000 Unit(s) SubCutaneous every 8 hours  influenza  Vaccine (HIGH DOSE) 0.7 milliLiter(s) IntraMuscular once  isosorbide   mononitrate ER Tablet (IMDUR) 30 milliGRAM(s) Oral daily  melatonin 3 milliGRAM(s) Oral at bedtime  Nephro-pito 1 Tablet(s) Oral daily  NIFEdipine XL 90 milliGRAM(s) Oral daily  pantoprazole    Tablet 40 milliGRAM(s) Oral every 12 hours  polyethylene glycol 3350 17 Gram(s) Oral every 12 hours  sevelamer carbonate 800 milliGRAM(s) Oral three times a day with meals  traZODone 100 milliGRAM(s) Oral at bedtime    PRN Medications:  dextrose Oral Gel 15 Gram(s) Oral once PRN Blood Glucose LESS THAN 70 milliGRAM(s)/deciliter  HYDROmorphone  Injectable 0.25 milliGRAM(s) IV Push every 3 hours PRN Mild Pain (1 - 3)  sodium chloride 0.9% lock flush 10 milliLiter(s) IV Push every 1 hour PRN Pre/post blood products, medications, blood draw, and to maintain line patency      Objective:   T(F): 97.9 (06-10 @ 14:45), Max: 98.9 (06-09 @ 21:52)  HR: 63 (06-10 @ 14:45) (58 - 73)  BP: 145/62 (06-10 @ 14:45) (145/62 - 165/64)  RR: 19 (06-10 @ 14:45) (18 - 19)  SpO2: 100% (06-10 @ 14:45) (97% - 100%)      Physical Exam:   GEN: patient resting comfortably in bed, in no acute distress  RESP: respirations are unlabored, no accessory muscle use, no conversational dyspnea  CVS: RRR  GI: Abdomen soft, non-tender, non-distended, no rebound tenderness / guarding  lt lower extremity s/p lt AKA dressing in place, dressing is C/D/I , yesterday was the first take down and  the sutule line looked healthy     I&O's    06-10 @ 07:01  -  06-10 @ 15:52  --------------------------------------------------------  IN:  Total IN: 0 mL    OUT:    Other (mL): 1000 mL  Total OUT: 1000 mL    Total NET: -1000 mL          LABS:                        9.7    6.90  )-----------( 256      ( 09 Jun 2022 08:16 )             32.1     06-09    143  |  102  |  24.7<H>  ----------------------------<  89  3.8   |  27.0  |  2.92<H>    Ca    8.8      09 Jun 2022 08:16  Phos  3.6     06-09  Mg     2.1     06-09            MICROBIOLOGY:     Culture - Blood (collected 06-03 @ 17:02)  Source: .Blood Blood-Peripheral  Final Report (06-08 @ 19:00):    No growth at 5 days.

## 2022-06-10 NOTE — PROGRESS NOTE ADULT - SUBJECTIVE AND OBJECTIVE BOX
Vital Signs Last 24 Hrs  T(C): 36.7 (10 Rolly 2022 10:50), Max: 37.2 (09 Jun 2022 21:52)  T(F): 98 (10 Rolly 2022 10:50), Max: 98.9 (09 Jun 2022 21:52)  HR: 68 (10 Rolly 2022 10:50) (58 - 73)  BP: 163/66 (10 Rolly 2022 10:50) (111/68 - 165/64)  RR: 19 (10 Rolly 2022 10:50) (18 - 19)  SpO2: 100% (10 Rolly 2022 10:50) (97% - 100%)  HD planned for today. No symptoms. Hemodynamics stable. Tolerating dialysis and ultrafiltration.  Pre Laboratory values personally reviewed by me.  Dialysis adjusted appropriately based on current values.  Will continue the current medical management.  Next hemodialysis as scheduled.  Discussed with nursing, primary care team.

## 2022-06-10 NOTE — PROGRESS NOTE ADULT - ASSESSMENT
· Assessment	  88yo M POD 6 lt aka. patient tolerated procedure well. pt tolerated dialysis via PermCath she will be have thrombectomy and possible revision of his left A-V fistula     Plan:    - cardiology called as per daughter request  - no new recommendations since H and H stable and BP stable   - Diet recommended per Speech and Swallow  specialist--  puree and no liquids to avoid  aspiration but daughter requested  regular   -Dressing with xeroform, gauze, abd, Kerlix, ace,  done daily   - Palliative  also onboard for his care  -PMR also following him   - working of his discharge  - Daily change of dressing   - Patient is going to have embolectomy possible revision of his A-V fistula  on Sunday     to be preopped on SAturday

## 2022-06-10 NOTE — CONSULT NOTE ADULT - ATTENDING COMMENTS
Patient known to PMR. Now s/p AKA.   Given new diagnosis, energy expenditure now 100-200% higher and will not be able to tolerate an intense 3 hr a day program.  Given family support and delirium risks, recommend DC HOME.     Will sign off at this time. Thank you for allowing me to be part of your patient's care. Please reconsult PMR for additional rehab recommendations or dispo needs if functional status changes.     Discussed with rehab clinical care team.

## 2022-06-10 NOTE — CONSULT NOTE ADULT - SUBJECTIVE AND OBJECTIVE BOX
88yM was admitted on 05-31    In ED, GCS=    Patient is a 88y old  Male who presents with a chief complaint of Acute Bleed (10 Rolly 2022 11:59)    HPI:  Patient is an 88M history of ESRD on HD, GI bleed secondary to AVMs, CAD, hypertension, PPM, HFpEF, hypertension, hyperlipidemia, CAD, atrial fibrillation, LLE DVT, aortic stenosis s/p TAVR, PAD s/p recent L fem to AT bypass with cryovein c/b acute graft rethrombosis s/p thrombectomy discharged on Lovenox, presented with complaints of BRBPR. Lovenox was discontinued at that admission, stabilized and transitioned to oral anticoagulation on Eliquis. At home earlier on day of admission., was found down at home with bleeding from the left leg where prior cryovein was anastamosed to AT artery. Patient was admitted with hemorrhagic shock.      Imaging performed:      REVIEW OF SYSTEMS  Constitutional - No fever, No weight loss, No fatigue  HEENT - No eye pain, No visual disturbances, No difficulty hearing, No tinnitus, No vertigo, No neck pain  Respiratory - No cough, No wheezing, No shortness of breath  Cardiovascular - No chest pain, No palpitations  Gastrointestinal - No abdominal pain, No nausea, No vomiting, No diarrhea, No constipation  Genitourinary - No dysuria, No frequency, No hematuria, No incontinence  Neurological - No headaches, No memory loss, No loss of strength, No numbness, No tremors  Skin - No itching, No rashes, No lesions   Endocrine - No temperature intolerance  Musculoskeletal - No joint pain, No joint swelling, No muscle pain  Psychiatric - No depression, No anxiety    VITALS  T(C): 36.7 (06-10-22 @ 10:50), Max: 37.2 (06-09-22 @ 21:52)  HR: 68 (06-10-22 @ 10:50) (58 - 73)  BP: 163/66 (06-10-22 @ 10:50) (155/75 - 165/64)  RR: 19 (06-10-22 @ 10:50) (18 - 19)  SpO2: 100% (06-10-22 @ 10:50) (97% - 100%)  Wt(kg): --    PAST MEDICAL & SURGICAL HISTORY  DM (diabetes mellitus)    AV block, 1st degree    Arrhythmia    CKD (chronic kidney disease)    CAD (coronary artery disease)    HTN (hypertension)    VT (ventricular tachycardia)    RICHARDS (dyspnea on exertion)    Anemia    Risk factors for obstructive sleep apnea    ESRD on dialysis    Aortic stenosis    GI bleeding    H/O circumcision    H/O left knee surgery    H/O angioplasty    A-V fistula    H/O carotid endarterectomy    S/P TAVR (transcatheter aortic valve replacement)    History of loop recorder        FUNCTIONAL HISTORY  Lives   Independent    CURRENT FUNCTIONAL STATUS      SOCIAL HISTORY - as per documentation/history  Smoking - None  EtOH - None  Drugs - None    FAMILY HISTORY   No pertinent family history in first degree relatives    Family history of cancer (Grandparent)    Family history of lung cancer (Grandparent)    Family history of premature CAD    FH: type 2 diabetes        RECENT LABS - Reviewed  CBC Full  -  ( 09 Jun 2022 08:16 )  WBC Count : 6.90 K/uL  RBC Count : 3.23 M/uL  Hemoglobin : 9.7 g/dL  Hematocrit : 32.1 %  Platelet Count - Automated : 256 K/uL  Mean Cell Volume : 99.4 fl  Mean Cell Hemoglobin : 30.0 pg  Mean Cell Hemoglobin Concentration : 30.2 gm/dL  Auto Neutrophil # : x  Auto Lymphocyte # : x  Auto Monocyte # : x  Auto Eosinophil # : x  Auto Basophil # : x  Auto Neutrophil % : x  Auto Lymphocyte % : x  Auto Monocyte % : x  Auto Eosinophil % : x  Auto Basophil % : x    06-09    143  |  102  |  24.7<H>  ----------------------------<  89  3.8   |  27.0  |  2.92<H>    Ca    8.8      09 Jun 2022 08:16  Phos  3.6     06-09  Mg     2.1     06-09          ALLERGIES  Plavix (Hives)  Toprol-XL (Rash)      MEDICATIONS   acetaminophen     Tablet .. 650 milliGRAM(s) Oral every 6 hours  aspirin  chewable 81 milliGRAM(s) Oral daily  atorvastatin 80 milliGRAM(s) Oral at bedtime  chlorhexidine 2% Cloths 1 Application(s) Topical <User Schedule>  dextrose 5%. 1000 milliLiter(s) IV Continuous <Continuous>  dextrose 5%. 1000 milliLiter(s) IV Continuous <Continuous>  dextrose 50% Injectable 25 Gram(s) IV Push once  dextrose 50% Injectable 12.5 Gram(s) IV Push once  dextrose 50% Injectable 25 Gram(s) IV Push once  dextrose Oral Gel 15 Gram(s) Oral once PRN  epoetin juan-epbx (RETACRIT) Injectable 81818 Unit(s) IV Push <User Schedule>  fentaNYL   Patch  12 MICROgram(s)/Hr 1 Patch Transdermal every 72 hours  glucagon  Injectable 1 milliGRAM(s) IntraMuscular once  heparin   Injectable 5000 Unit(s) SubCutaneous every 8 hours  HYDROmorphone  Injectable 0.25 milliGRAM(s) IV Push every 3 hours PRN  influenza  Vaccine (HIGH DOSE) 0.7 milliLiter(s) IntraMuscular once  isosorbide   mononitrate ER Tablet (IMDUR) 30 milliGRAM(s) Oral daily  melatonin 3 milliGRAM(s) Oral at bedtime  Nephro-pito 1 Tablet(s) Oral daily  NIFEdipine XL 90 milliGRAM(s) Oral daily  pantoprazole    Tablet 40 milliGRAM(s) Oral every 12 hours  polyethylene glycol 3350 17 Gram(s) Oral every 12 hours  sevelamer carbonate 800 milliGRAM(s) Oral three times a day with meals  sodium chloride 0.9% lock flush 10 milliLiter(s) IV Push every 1 hour PRN  traZODone 100 milliGRAM(s) Oral at bedtime      ----------------------------------------------------------------------------------------  PHYSICAL EXAM  Constitutional - NAD, Comfortable  HEENT - NCAT, EOMI  Neck - Supple, No limited ROM  Chest - Breathing comfortably, No wheezing  Cardiovascular - S1S2   Abdomen - Soft   Extremities - No C/C/E, No calf tenderness   Neurologic Exam -                    Cognitive - AAO to self, place, date, year, situation     Communication - Fluent, No dysarthria     Cranial Nerves - CN 2-12 intact     Motor - No focal deficits                    LEFT    UE - ShAB 5/5, EF 5/5, EE 5/5, WE 5/5,  5/5                    RIGHT UE - ShAB 5/5, EF 5/5, EE 5/5, WE 5/5,  5/5                    LEFT    LE - HF 5/5, KE 5/5, DF 5/5, PF 5/5                    RIGHT LE - HF 5/5, KE 5/5, DF 5/5, PF 5/5        Sensory - Intact to LT     Reflexes - DTR Intact, No primitive reflexive     Coordination - FTN intact     OculoVestibular - No saccades, No nystagmus, VOR         Balance - WNL Static  Psychiatric - Mood stable, Affect WNL  ----------------------------------------------------------------------------------------  ASSESSMENT/PLAN  88yMale with functional deficits after  Pain - Tylenol  DVT PPX - SCDs  Rehab -    Recommend ACUTE inpatient rehabilitation for the functional deficits consisting of 3 hours of therapy/day & 24 hour RN/daily PMR physician for comorbid medical management. Patient will be able to tolerate 3 hours a day.   Recommend [facility], patient DOES NOT meet acute inpatient rehabilitation criteria. Patient needs a more prolonged stay to achieve transition to community.    Expect patient to achieve functional goals for DC HOME with OUTPATIENT   Expect patient to achieve functional goals for DC HOME with HOME CARE   Follow up with CONCUSSION PROGRAM - Call [phone] for an appointment    Will continue to follow and current recommendations may change if functional progress changes.    Recommend ongoing mobilization by staff to maintain cardiopulmonary function and prevention of secondary complications related to debility. Discussed with rehab team.  88yM was admitted on 05-31    In ED, GCS=    Patient is a 88y old  Male who presents with a chief complaint of Acute Bleed (10 Rolly 2022 11:59)    HPI:  Patient is an 88M history of ESRD on HD, GI bleed secondary to AVMs, CAD, hypertension, PPM, HFpEF, hypertension, hyperlipidemia, CAD, atrial fibrillation, LLE DVT, aortic stenosis s/p TAVR, PAD s/p recent L fem to AT bypass with cryovein c/b acute graft rethrombosis s/p thrombectomy discharged on Lovenox, presented with complaints of BRBPR. Lovenox was discontinued at that admission, stabilized and transitioned to oral anticoagulation on Eliquis. At home earlier on day of admission., was found down at home with bleeding from the left leg where prior cryovein was anastamosed to AT artery. Patient was admitted with hemorrhagic shock and emergently taken to the ED. On 6/3, AKA was performed due to persistent ischemia and pain of the left foot. Hospital course was complicated by thrombosed L AVF s/p permacath placement and intermittent delirium/sundowning.    SUBJECTIVE/OVERNIGHT EVENTS:  Patient was seen and examined at bedside this morning. Plan was originally for dc home yesterday; however, family requested inpatient repair fo AVF and patient was scheduled for thrombectomy 6/12. Overnight, patient was agitated and delirious, desaturating intermittently and pulling at his O2. 1:1 instated. This morning he is extremely fatigued, though denies pain. Family reportedly wants to take patient home.    Imaging performed:  Xray Chest 1 View AP/PA. (06.03.22 @ 15:55)  Increased bibasilar airspace disease. Pneumonia not excluded. No   pneumothorax.  IMPRESSION: Increasing bilateral airspace disease as above      US Duplex Hemodialysis Access (06.01.22 @ 13:52)  Impression:  There is a AV fistula and the left arm, which appears to be a   radiocephalic fistula. The radial artery is patent with normal waveforms.   The fistula itself is occluded          REVIEW OF SYSTEMS  Constitutional - No fever, No weight loss, +fatigue  HEENT - No eye pain, No visual disturbances, +difficulty hearing, No tinnitus, No vertigo, No neck pain  Respiratory - No cough, No wheezing, No shortness of breath  Cardiovascular - No chest pain, No palpitations  Gastrointestinal - No abdominal pain, No nausea, No vomiting, No diarrhea, No constipation  Genitourinary - No dysuria, No frequency, No hematuria, No incontinence  Neurological - No headaches, +memory loss, +loss of strength +Delirium  Skin - +Surgical incisions  Endocrine - No temperature intolerance  Musculoskeletal - No joint pain, No joint swelling, No muscle pain  Psychiatric - No depression, No anxiety    VITALS  T(C): 36.7 (06-10-22 @ 10:50), Max: 37.2 (06-09-22 @ 21:52)  HR: 68 (06-10-22 @ 10:50) (58 - 73)  BP: 163/66 (06-10-22 @ 10:50) (155/75 - 165/64)  RR: 19 (06-10-22 @ 10:50) (18 - 19)  SpO2: 100% (06-10-22 @ 10:50) (97% - 100%)  Wt(kg): --    PAST MEDICAL & SURGICAL HISTORY  DM (diabetes mellitus)    AV block, 1st degree    Arrhythmia    CKD (chronic kidney disease)    CAD (coronary artery disease)    HTN (hypertension)    VT (ventricular tachycardia)    RICHARDS (dyspnea on exertion)    Anemia    Risk factors for obstructive sleep apnea    ESRD on dialysis    Aortic stenosis    GI bleeding    H/O circumcision    H/O left knee surgery    H/O angioplasty    A-V fistula    H/O carotid endarterectomy    S/P TAVR (transcatheter aortic valve replacement)    History of loop recorder        FUNCTIONAL HISTORY  Lives with his spouse in a home with 3 JESSICA. Prior to admission, he required assistance with ADLs and utilized a wheelchair for mobility  Independent    CURRENT FUNCTIONAL STATUS  6/9 PT -   Bed Mobility  Bed Mobility Training Supine-to-Sit: maximum assist (25% patient effort);  1 person assist;  verbal cues  Bed Mobility Training Limitations: decreased ability to use arms for pushing/pulling;  impaired ability to control trunk for mobility;  decreased strength;  pain    Bed-Chair Transfer Training  Transfer Training Bed-to-Chair Transfer: maximum assist (25% patient effort);  1 person assist;  verbal cues;  NWB LE , popover transfer  Bed-to-Chair Transfer Training Transfer Safety Analysis: decreased sequencing ability;  decreased weight-shifting ability;  decreased strength    Sit-Stand Transfer Training  Transfer Training Sit-to-Stand Transfer: maximum assist (25% patient effort);  1 person assist;  verbal cues;  rolling walker;  NWB LLE  Transfer Training Stand-to-Sit Transfer: maximum assist (25% patient effort);  1 person assist;  verbal cues;  rolling walker  Sit-to-Stand Transfer Training Transfer Safety Analysis: decreased sequencing ability;  decreased weight-shifting ability;  decreased strength    SOCIAL HISTORY - as per documentation/history  Smoking - None  EtOH - None  Drugs - None    FAMILY HISTORY   No pertinent family history in first degree relatives    Family history of cancer (Grandparent)    Family history of lung cancer (Grandparent)    Family history of premature CAD    FH: type 2 diabetes        RECENT LABS - Reviewed  CBC Full  -  ( 09 Jun 2022 08:16 )  WBC Count : 6.90 K/uL  RBC Count : 3.23 M/uL  Hemoglobin : 9.7 g/dL  Hematocrit : 32.1 %  Platelet Count - Automated : 256 K/uL  Mean Cell Volume : 99.4 fl  Mean Cell Hemoglobin : 30.0 pg  Mean Cell Hemoglobin Concentration : 30.2 gm/dL  Auto Neutrophil # : x  Auto Lymphocyte # : x  Auto Monocyte # : x  Auto Eosinophil # : x  Auto Basophil # : x  Auto Neutrophil % : x  Auto Lymphocyte % : x  Auto Monocyte % : x  Auto Eosinophil % : x  Auto Basophil % : x    06-09    143  |  102  |  24.7<H>  ----------------------------<  89  3.8   |  27.0  |  2.92<H>    Ca    8.8      09 Jun 2022 08:16  Phos  3.6     06-09  Mg     2.1     06-09          ALLERGIES  Plavix (Hives)  Toprol-XL (Rash)      MEDICATIONS   acetaminophen     Tablet .. 650 milliGRAM(s) Oral every 6 hours  aspirin  chewable 81 milliGRAM(s) Oral daily  atorvastatin 80 milliGRAM(s) Oral at bedtime  chlorhexidine 2% Cloths 1 Application(s) Topical <User Schedule>  dextrose 5%. 1000 milliLiter(s) IV Continuous <Continuous>  dextrose 5%. 1000 milliLiter(s) IV Continuous <Continuous>  dextrose 50% Injectable 25 Gram(s) IV Push once  dextrose 50% Injectable 12.5 Gram(s) IV Push once  dextrose 50% Injectable 25 Gram(s) IV Push once  dextrose Oral Gel 15 Gram(s) Oral once PRN  epoetin juan-epbx (RETACRIT) Injectable 30379 Unit(s) IV Push <User Schedule>  fentaNYL   Patch  12 MICROgram(s)/Hr 1 Patch Transdermal every 72 hours  glucagon  Injectable 1 milliGRAM(s) IntraMuscular once  heparin   Injectable 5000 Unit(s) SubCutaneous every 8 hours  HYDROmorphone  Injectable 0.25 milliGRAM(s) IV Push every 3 hours PRN  influenza  Vaccine (HIGH DOSE) 0.7 milliLiter(s) IntraMuscular once  isosorbide   mononitrate ER Tablet (IMDUR) 30 milliGRAM(s) Oral daily  melatonin 3 milliGRAM(s) Oral at bedtime  Nephro-pito 1 Tablet(s) Oral daily  NIFEdipine XL 90 milliGRAM(s) Oral daily  pantoprazole    Tablet 40 milliGRAM(s) Oral every 12 hours  polyethylene glycol 3350 17 Gram(s) Oral every 12 hours  sevelamer carbonate 800 milliGRAM(s) Oral three times a day with meals  sodium chloride 0.9% lock flush 10 milliLiter(s) IV Push every 1 hour PRN  traZODone 100 milliGRAM(s) Oral at bedtime      ----------------------------------------------------------------------------------------  PHYSICAL EXAM  Constitutional - NAD, +Lethargic  HEENT - NCAT, EOMI +[place]  Neck - Supple, No limited ROM  Chest - Breathing comfortably, No wheezing  Cardiovascular - S1S2   Abdomen - Soft   Extremities - No C/C/E, No calf tenderness . +Tightness of hamstring RLE  Neurologic Exam -                    Cognitive - AAO to self, not place, date, year, situation at time of exam     Communication - Fluent, No dysarthria     Cranial Nerves - CN 2-12 intact     Motor - No focal deficits - motor exam limited due to participation                    LEFT    UE - ShAB 4+/5, EF 4+/5, EE 4+/5, WE 4+/5,  4+/5                    RIGHT UE - ShAB 4+/5, EF 4+/5, EE 4+/5, WE 4+/5,  4+/5                    LEFT    LE - HF 1/5                    RIGHT LE - HF 2/5, KE 2/5, DF 4/5, PF 4/5        Sensory - Intact to LT     Reflexes - DTR Intact, No primitive reflexive     Coordination - FTN not assessed d/t participation     OculoVestibular - No saccades, No nystagmus, VOR         Balance - WNL Static  Psychiatric - Mood stable, Affect WNL  ----------------------------------------------------------------------------------------  ASSESSMENT/PLAN  88yMale with history of severe PAD, recent L fem to AT bypass now with functional deficits s/p AKA on 6/3/22.  Pain - Continue Tylenol. Recommend increasing fentanyl patch to 25mcg. Consider oxycodone or tramadol standing dose QHS   Sleep - Continue melatonin. Recommend dose trazodone at 8PM and add seroquel 25mg PRN for delirium/insomnia.  Bowel regimen - Monitor for constipation with opioid use. Continue miralax, consider adding senna  ESRD - HD per renal. AVF thrombectomy 6/12  PAD- ASA, lipitor  HTN - Nifedipine, imdur  DVT PPX - HSQ  Rehab - Given patient's history, functional status, and family support, agree that ideal discharge is home with family support. Patient unlikely to make significant functional gains at acute rehab. Patient's family is supportive and would like to take him home. PM&R will sign off at this time, please reconsult with further questions.    Will continue to follow and current recommendations may change if functional progress changes.    Recommend ongoing mobilization by staff to maintain cardiopulmonary function and prevention of secondary complications related to debility. Discussed with rehab team.

## 2022-06-11 NOTE — PROGRESS NOTE ADULT - SUBJECTIVE AND OBJECTIVE BOX
[facility] DIVISION OF KIDNEY DISEASES AND HYPERTENSION -- HEMODIALYSIS NOTE  --------------------------------------------------------------------------------  Chief Complaint: ESRD/Ongoing hemodialysis requirement    24 hour events/subjective:  s/p HD yesterday  Pt faizan and examined; feels well  Denies any acute complaint          PAST HISTORY  --------------------------------------------------------------------------------  No significant changes to PMH, PSH, FHx, SHx, unless otherwise noted    ALLERGIES & MEDICATIONS  --------------------------------------------------------------------------------  Allergies    Plavix (Hives)  Toprol-XL (Rash)    Intolerances      Standing Inpatient Medications  acetaminophen     Tablet .. 650 milliGRAM(s) Oral every 6 hours  aspirin  chewable 81 milliGRAM(s) Oral daily  atorvastatin 80 milliGRAM(s) Oral at bedtime  chlorhexidine 2% Cloths 1 Application(s) Topical <User Schedule>  dextrose 5%. 1000 milliLiter(s) IV Continuous <Continuous>  dextrose 5%. 1000 milliLiter(s) IV Continuous <Continuous>  dextrose 50% Injectable 25 Gram(s) IV Push once  dextrose 50% Injectable 12.5 Gram(s) IV Push once  dextrose 50% Injectable 25 Gram(s) IV Push once  epoetin juan-epbx (RETACRIT) Injectable 68962 Unit(s) IV Push <User Schedule>  fentaNYL   Patch  12 MICROgram(s)/Hr 1 Patch Transdermal every 72 hours  glucagon  Injectable 1 milliGRAM(s) IntraMuscular once  heparin   Injectable 5000 Unit(s) SubCutaneous every 8 hours  influenza  Vaccine (HIGH DOSE) 0.7 milliLiter(s) IntraMuscular once  isosorbide   mononitrate ER Tablet (IMDUR) 30 milliGRAM(s) Oral daily  melatonin 3 milliGRAM(s) Oral at bedtime  Nephro-pito 1 Tablet(s) Oral daily  NIFEdipine XL 90 milliGRAM(s) Oral daily  pantoprazole    Tablet 40 milliGRAM(s) Oral every 12 hours  polyethylene glycol 3350 17 Gram(s) Oral every 12 hours  sevelamer carbonate 800 milliGRAM(s) Oral three times a day with meals  traZODone 100 milliGRAM(s) Oral at bedtime    PRN Inpatient Medications  dextrose Oral Gel 15 Gram(s) Oral once PRN  sodium chloride 0.9% lock flush 10 milliLiter(s) IV Push every 1 hour PRN      REVIEW OF SYSTEMS  --------------------------------------------------------------------------------  Gen: No weight changes, fatigue, fevers/chills, weakness  Skin: No rashes  Head/Eyes/Ears/Mouth: No headache  Respiratory: No dyspnea, cough,  CV: No chest pain, orthopnea  GI: No abdominal pain, diarrhea, constipation, nausea, vomiting,  MSK: No joint pain  Neuro: No dizziness/lightheadedness, weakness  Heme: No bleeding  Psych: No significant nervousness, anxiety, stress, depression    All other systems were reviewed and are negative, except as noted.    VITALS/PHYSICAL EXAM  --------------------------------------------------------------------------------  T(C): 36.5 (06-11-22 @ 09:00), Max: 36.8 (06-10-22 @ 22:34)  HR: 60 (06-11-22 @ 09:00) (56 - 68)  BP: 154/56 (06-11-22 @ 09:00) (145/62 - 165/68)  RR: 18 (06-11-22 @ 09:00) (16 - 19)  SpO2: 98% (06-11-22 @ 09:00) (94% - 100%)  Wt(kg): --        06-10-22 @ 07:01  -  06-11-22 @ 07:00  --------------------------------------------------------  IN: 0 mL / OUT: 1000 mL / NET: -1000 mL      Physical Exam:  	Gen: NAD,  	HEENT: on NC  	Pulm: CTA B/L  	CV: RRR, S1S2; no rub  	Abd: +BS, soft, nontender  	UE: Warm  	LE: Warm, left AKA  	Neuro: No focal deficits  	Psych: Normal affect and mood  	Skin: Warm, pallor+  	[facility] access: [facility]    LABS/STUDIES  --------------------------------------------------------------------------------  Iron 43, TIBC 249, %sat 17      [07-13-21 @ 16:07]  Ferritin 498      [07-13-21 @ 16:07]  HbA1c 4.9      [08-09-18 @ 05:54]  TSH 5.55      [06-03-22 @ 15:44]    HBsAg Nonreact      [05-16-22 @ 11:20]

## 2022-06-11 NOTE — PROGRESS NOTE ADULT - SUBJECTIVE AND OBJECTIVE BOX
INTERVAL HPI/OVERNIGHT EVENTS:    Patient evaluated at bedside. No acute distress. No acute events overnight.  1:1 placed overnight, patient was calmer and able to sleep    MEDICATIONS  (STANDING):  acetaminophen     Tablet .. 650 milliGRAM(s) Oral every 6 hours  aspirin  chewable 81 milliGRAM(s) Oral daily  atorvastatin 80 milliGRAM(s) Oral at bedtime  chlorhexidine 2% Cloths 1 Application(s) Topical <User Schedule>  dextrose 5%. 1000 milliLiter(s) (100 mL/Hr) IV Continuous <Continuous>  dextrose 5%. 1000 milliLiter(s) (50 mL/Hr) IV Continuous <Continuous>  dextrose 50% Injectable 25 Gram(s) IV Push once  dextrose 50% Injectable 12.5 Gram(s) IV Push once  dextrose 50% Injectable 25 Gram(s) IV Push once  epoetin juan-epbx (RETACRIT) Injectable 83608 Unit(s) IV Push <User Schedule>  fentaNYL   Patch  12 MICROgram(s)/Hr 1 Patch Transdermal every 72 hours  glucagon  Injectable 1 milliGRAM(s) IntraMuscular once  heparin   Injectable 5000 Unit(s) SubCutaneous every 8 hours  influenza  Vaccine (HIGH DOSE) 0.7 milliLiter(s) IntraMuscular once  isosorbide   mononitrate ER Tablet (IMDUR) 30 milliGRAM(s) Oral daily  melatonin 3 milliGRAM(s) Oral at bedtime  Nephro-pito 1 Tablet(s) Oral daily  NIFEdipine XL 90 milliGRAM(s) Oral daily  pantoprazole    Tablet 40 milliGRAM(s) Oral every 12 hours  polyethylene glycol 3350 17 Gram(s) Oral every 12 hours  sevelamer carbonate 800 milliGRAM(s) Oral three times a day with meals  traZODone 100 milliGRAM(s) Oral at bedtime    MEDICATIONS  (PRN):  dextrose Oral Gel 15 Gram(s) Oral once PRN Blood Glucose LESS THAN 70 milliGRAM(s)/deciliter  sodium chloride 0.9% lock flush 10 milliLiter(s) IV Push every 1 hour PRN Pre/post blood products, medications, blood draw, and to maintain line patency      Vital Signs Last 24 Hrs  T(C): 36.8 (10 Rolly 2022 22:34), Max: 36.8 (10 Rolly 2022 22:34)  T(F): 98.2 (10 Rolly 2022 22:34), Max: 98.2 (10 Rolly 2022 22:34)  HR: 60 (10 Rolly 2022 22:34) (57 - 73)  BP: 149/70 (10 Rolly 2022 22:34) (145/62 - 165/68)  BP(mean): --  RR: 16 (10 Rolly 2022 22:34) (16 - 19)  SpO2: 97% (10 Rolly 2022 22:34) (97% - 100%)    Physical Exam:   GEN: patient resting comfortably in bed, in no acute distress  RESP: respirations are unlabored, no accessory muscle use, no conversational dyspnea  CVS: RRR  GI: Abdomen soft, non-tender, non-distended, no rebound tenderness / guarding  lt lower extremity s/p lt AKA dressing in place, dressing is C/D/I       I&O's Detail    10 Rolly 2022 07:01  -  11 Jun 2022 03:29  --------------------------------------------------------  IN:  Total IN: 0 mL    OUT:    Other (mL): 1000 mL  Total OUT: 1000 mL    Total NET: -1000 mL          LABS:                        9.7    6.90  )-----------( 256      ( 09 Jun 2022 08:16 )             32.1     06-09    143  |  102  |  24.7<H>  ----------------------------<  89  3.8   |  27.0  |  2.92<H>    Ca    8.8      09 Jun 2022 08:16  Phos  3.6     06-09  Mg     2.1     06-09            RADIOLOGY & ADDITIONAL STUDIES:

## 2022-06-11 NOTE — OCCUPATIONAL THERAPY INITIAL EVALUATION ADULT - ADDITIONAL COMMENTS
Pt has tub with curtain. Pt owns a RW, commode, wheelchair, and cane. Pt is right handed. Pt requires assist for functional mobility, ADLs, and IADLs. Pt has family available to assist as needed.

## 2022-06-11 NOTE — OCCUPATIONAL THERAPY INITIAL EVALUATION ADULT - PLANNED THERAPY INTERVENTIONS, OT EVAL
ADL retraining/IADL retraining/balance training/cognitive, visual perceptual/fine motor coordination training/motor coordination training/neuromuscular re-education/strengthening/transfer training

## 2022-06-11 NOTE — PROGRESS NOTE ADULT - ASSESSMENT
88yo M POD 7 Left AKA. Patient tolerated procedure well. pt tolerated dialysis via PermCath he will be having a planned procedure for thrombectomy and possible revision of his left A-V fistula     Plan:    - cardiology called as per daughter request  - no new recommendations since H and H stable and BP stable   - Diet recommended per Speech and Swallow  specialist--  puree and no liquids to avoid  aspiration but daughter requested  regular   -Dressing with xeroform, gauze, abd, Kerlix, ace,  done daily   - Palliative  also onboard for his care  -PMR also following him   - working of his discharge  - Daily change of dressing   - Patient is going to have embolectomy possible revision of his A-V fistula  on 6/12  - Pre op on 6/11

## 2022-06-11 NOTE — OCCUPATIONAL THERAPY INITIAL EVALUATION ADULT - LIVES WITH, PROFILE
Pt questionable historian, per prior notes EMR: pt lives in a private house with 3 steps to enter with railing and 1 step inside. Pt has a ramp to enter/spouse

## 2022-06-11 NOTE — PROGRESS NOTE ADULT - ASSESSMENT
ESRD on HD  s/p Left AKA  Anemia of CKD      HD on Monday  Hb low/ stable- continue AMY with HD   Plan for OR for revision of his A-V fistula on 6/12

## 2022-06-11 NOTE — OCCUPATIONAL THERAPY INITIAL EVALUATION ADULT - LEVEL OF INDEPENDENCE: DRESS UPPER BODY, OT EVAL
"ED Provider Note    CHIEF COMPLAINT  Chief Complaint   Patient presents with   • Fever   • Body Aches   • Headache   • Cough       HPI  Richard Angel is a 30 y.o. adult who presents to the emergency department chief complaint of 5 to 6 days of progressively worsening cough congestion headache body aches and fever.  He states some taking Tylenol without relief of his symptoms.  He denies any neck stiffness or vomiting he feels nauseated but he did really does not have much of an appetite.  No diarrhea no abdominal pain no flank pain no dysuria.  He was last [place] 2 months ago and has been feeling well since he has been here until this event.  He denies any known contacts of coronavirus exposure.  He is just here because he wants to get checked out.  Last time he took any Tylenol was yesterday.    REVIEW OF SYSTEMS  Positives as above. Pertinent negatives include vomiting diarrhea abdominal pain chest pain shortness of breath unilateral bilateral leg swelling tobacco use neck stiffness confusion dizziness vision changes speech difficulty unilateral weakness numbness or tingling  All other review of systems are negative    PAST MEDICAL HISTORY       SOCIAL HISTORY  Social History     Tobacco Use   • Smoking status: Never Smoker   Substance and Sexual Activity   • Alcohol use: Never     Frequency: Never   • Drug use: Never   • Sexual activity: Not on file       SURGICAL HISTORY  patient denies any surgical history    CURRENT MEDICATIONS  Home Medications     Reviewed by Priya Sauceda R.N. (Registered Nurse) on 11/11/20 at 1928  Med List Status: Partial   Medication Last Dose Status        Patient Fritz Taking any Medications                       ALLERGIES  No Known Allergies    PHYSICAL EXAM  VITAL SIGNS: /88   Pulse (!) 110   Temp (!) 39.2 °C (102.6 °F) (Oral)   Resp 18   Ht 1.8 m (5' 10.87\")   Wt 87.7 kg (193 lb 5.5 oz)   SpO2 96%   BMI 27.07 kg/m²    Pulse ox interpretation: I interpret " this pulse ox as normal.  Constitutional: Alert in no apparent distress.  HENT: Normocephalic atraumatic, MMM oropharynx clear and moist uvula bilateral tonsillar erythema  Eyes: PER, Conjunctiva normal, Non-icteric.   Neck: Normal range of motion, No tenderness, Supple, No stridor.   Cardiovascular: Tachycardic regular rhythm, no murmurs.   Thorax & Lungs: Normal breath sounds, No respiratory distress, No wheezing, No chest tenderness.   Abdomen: Bowel sounds normal, Soft, No tenderness, No pulsatile masses. No peritoneal signs.  Skin: Hot to touch, Dry, No erythema, No rash.   Back: No bony tenderness, No CVA tenderness.   Extremities/MSK: Intact equal distal pulses, No edema, No tenderness, No cyanosis, no major deformities noted  Neurologic: Alert and oriented x3, No focal deficits noted.       DIFFERENTIAL DIAGNOSIS AND WORK UP PLAN    This is a 30 y.o. adult who presents with nonproductive cough and currently febrile, signs symptoms likely consistent with a viral infection Covid versus influenza, his oropharynx is erythematous will evaluate for strep pharyngitis.  Will treat with Tylenol and ibuprofen here in the emergency department.  He has no respiratory distress his lung sounds are clear and equal bilaterally is not tachypneic and is not hypoxic by any means.    DIAGNOSTIC STUDIES / PROCEDURES    EKG  No results found for this or any previous visit.    LABS  Pertinent Lab Findings  [facility]flu[facility] and strep all negative[facility]      RADIOLOGY  DX-CHEST-PORTABLE (1 VIEW)   Final Result      Minimal and equivocal left basilar opacities as noted above.        The radiologist's interpretation of all radiological studies have been reviewed by me.      COURSE & MEDICAL DECISION MAKING  Pertinent Labs & Imaging studies reviewed. (See chart for details)    1:16 AM  Patient is influenza and strep test are all negative, he is not hypoxic or in distress but possible left basilar opacities noted on his chest  "x-ray thus will be sent home with oral antibiotics for concern for community-acquired pneumonia but still pending his coronavirus so he was given strict return precautions as well as the need for self-isolation at home.  He was given strict return precautions for any new or worsening difficulty breathing nausea or vomiting is not controlled medications at home the importance of finishing his antibiotics and continue take medications for cough and cold symptoms.  He understands feels comfortable going home    /57   Pulse 80   Temp (!) 39.2 °C (102.6 °F) (Oral)   Resp (!) 21   Ht 1.8 m (5' 10.87\")   Wt 87.7 kg (193 lb 5.5 oz)   SpO2 96%   BMI 27.07 kg/m²       I verified that the patient was wearing a mask and I was wearing appropriate PPE every time I entered the room. The patient's mask was on the patient at all times during my encounter except for a brief view of the oropharynx.    The patient will return for new or worsening symptoms and is stable at the time of discharge.    The patient is referred to a primary physician for blood pressure management, diabetic screening, and for all other preventative health concerns.    DISPOSITION:  Patient will be discharged home in stable condition.    FOLLOW UP:  [facility], Emergency Dept  [address] [id number] [phone]    If symptoms worsen      OUTPATIENT MEDICATIONS:  New Prescriptions    AMOXICILLIN (AMOXIL) 500 MG CAP    Take 2 Caps by mouth 3 times a day for 5 days.    DEXTROMETHORPHAN-GUAIFENESIN (TUSSIN DM)  MG/5ML SYRUP    Take 10 mL by mouth every 6 hours as needed.    ONDANSETRON (ZOFRAN ODT) 4 MG TABLET DISPERSIBLE    Take 1 Tab by mouth every 6 hours as needed.           FINAL IMPRESSION  1. Community acquired pneumonia of left lower lobe of lung     2. Nausea without vomiting     3. Suspected 2019 novel coronavirus infection             Electronically signed by: Nicolette Hunt M.D., 11/11/2020 " 9:36 PM    This dictation has been created using voice recognition software and/or scribes. The accuracy of the dictation is limited by the abilities of the software and the expertise of the scribes. I expect there may be some errors of grammar and possibly content. I made every attempt to manually correct the errors within my dictation. However, errors related to voice recognition software and/or scribes may still exist and should be interpreted within the appropriate context.     to don/doff gown seated/maximum assist (25% patients effort)

## 2022-06-11 NOTE — OCCUPATIONAL THERAPY INITIAL EVALUATION ADULT - DIAGNOSIS, OT EVAL
88 year old Male presenting with hemorrhagic shock with bleeding from L fem- AT bypass graft now s/p L AKA (6/3)

## 2022-06-12 NOTE — PROGRESS NOTE ADULT - SUBJECTIVE AND OBJECTIVE BOX
INTERVAL HPI/OVERNIGHT EVENTS:    Patient evaluated at bedside. No acute distress. No acute events overnight.  1:1 placed overnight, patient was calmer and able to sleep    MEDICATIONS  (STANDING):  acetaminophen     Tablet .. 650 milliGRAM(s) Oral every 6 hours  aspirin  chewable 81 milliGRAM(s) Oral daily  atorvastatin 80 milliGRAM(s) Oral at bedtime  chlorhexidine 2% Cloths 1 Application(s) Topical <User Schedule>  dextrose 5%. 1000 milliLiter(s) (100 mL/Hr) IV Continuous <Continuous>  dextrose 5%. 1000 milliLiter(s) (50 mL/Hr) IV Continuous <Continuous>  dextrose 50% Injectable 25 Gram(s) IV Push once  dextrose 50% Injectable 12.5 Gram(s) IV Push once  dextrose 50% Injectable 25 Gram(s) IV Push once  epoetin juan-epbx (RETACRIT) Injectable 21163 Unit(s) IV Push <User Schedule>  fentaNYL   Patch  12 MICROgram(s)/Hr 1 Patch Transdermal every 72 hours  glucagon  Injectable 1 milliGRAM(s) IntraMuscular once  heparin   Injectable 5000 Unit(s) SubCutaneous every 8 hours  influenza  Vaccine (HIGH DOSE) 0.7 milliLiter(s) IntraMuscular once  isosorbide   mononitrate ER Tablet (IMDUR) 30 milliGRAM(s) Oral daily  melatonin 3 milliGRAM(s) Oral at bedtime  Nephro-pito 1 Tablet(s) Oral daily  NIFEdipine XL 90 milliGRAM(s) Oral daily  pantoprazole    Tablet 40 milliGRAM(s) Oral every 12 hours  polyethylene glycol 3350 17 Gram(s) Oral every 12 hours  QUEtiapine 25 milliGRAM(s) Oral at bedtime  sevelamer carbonate 800 milliGRAM(s) Oral three times a day with meals  traZODone 100 milliGRAM(s) Oral at bedtime    MEDICATIONS  (PRN):  dextrose Oral Gel 15 Gram(s) Oral once PRN Blood Glucose LESS THAN 70 milliGRAM(s)/deciliter  sodium chloride 0.9% lock flush 10 milliLiter(s) IV Push every 1 hour PRN Pre/post blood products, medications, blood draw, and to maintain line patency      Vital Signs Last 24 Hrs  T(C): 36.8 (12 Jun 2022 01:00), Max: 36.9 (11 Jun 2022 22:02)  T(F): 98.2 (12 Jun 2022 01:00), Max: 98.4 (11 Jun 2022 22:02)  HR: 64 (12 Jun 2022 01:00) (56 - 64)  BP: 116/56 (12 Jun 2022 01:00) (116/56 - 154/56)  BP(mean): 80 (11 Jun 2022 22:02) (80 - 82)  RR: 18 (12 Jun 2022 01:00) (18 - 18)  SpO2: 98% (12 Jun 2022 01:00) (94% - 100%)    Physical Exam:   GEN: patient resting comfortably in bed, in no acute distress  RESP: respirations are unlabored, no accessory muscle use, no conversational dyspnea  CVS: RRR  GI: Abdomen soft, non-tender, non-distended, no rebound tenderness / guarding  lt lower extremity s/p lt AKA dressing in place, dressing is C/D/I     I&O's Detail    10 Rolly 2022 07:01  -  11 Jun 2022 07:00  --------------------------------------------------------  IN:  Total IN: 0 mL    OUT:    Other (mL): 1000 mL  Total OUT: 1000 mL    Total NET: -1000 mL          LABS:                RADIOLOGY & ADDITIONAL STUDIES:

## 2022-06-12 NOTE — PROGRESS NOTE ADULT - SUBJECTIVE AND OBJECTIVE BOX
[facility] DIVISION OF KIDNEY DISEASES AND HYPERTENSION -- HEMODIALYSIS NOTE  --------------------------------------------------------------------------------  Chief Complaint: ESRD/Ongoing hemodialysis requirement    24 hour events/subjective:  no acute event  on 1;1- calm  pt seen and examined; c/o right leg pain      PAST HISTORY  --------------------------------------------------------------------------------  No significant changes to PMH, PSH, FHx, SHx, unless otherwise noted    ALLERGIES & MEDICATIONS  --------------------------------------------------------------------------------  Allergies    Plavix (Hives)  Toprol-XL (Rash)    Intolerances      Standing Inpatient Medications  acetaminophen     Tablet .. 650 milliGRAM(s) Oral every 6 hours  aspirin  chewable 81 milliGRAM(s) Oral daily  atorvastatin 80 milliGRAM(s) Oral at bedtime  chlorhexidine 2% Cloths 1 Application(s) Topical <User Schedule>  dextrose 5%. 1000 milliLiter(s) IV Continuous <Continuous>  dextrose 5%. 1000 milliLiter(s) IV Continuous <Continuous>  dextrose 50% Injectable 25 Gram(s) IV Push once  dextrose 50% Injectable 12.5 Gram(s) IV Push once  dextrose 50% Injectable 25 Gram(s) IV Push once  epoetin juan-epbx (RETACRIT) Injectable 04442 Unit(s) IV Push <User Schedule>  fentaNYL   Patch  12 MICROgram(s)/Hr 1 Patch Transdermal every 72 hours  glucagon  Injectable 1 milliGRAM(s) IntraMuscular once  heparin   Injectable 5000 Unit(s) SubCutaneous every 8 hours  influenza  Vaccine (HIGH DOSE) 0.7 milliLiter(s) IntraMuscular once  isosorbide   mononitrate ER Tablet (IMDUR) 30 milliGRAM(s) Oral daily  melatonin 3 milliGRAM(s) Oral at bedtime  Nephro-pito 1 Tablet(s) Oral daily  NIFEdipine XL 90 milliGRAM(s) Oral daily  pantoprazole    Tablet 40 milliGRAM(s) Oral every 12 hours  polyethylene glycol 3350 17 Gram(s) Oral every 12 hours  QUEtiapine 25 milliGRAM(s) Oral at bedtime  sevelamer carbonate 800 milliGRAM(s) Oral three times a day with meals  traZODone 100 milliGRAM(s) Oral at bedtime    PRN Inpatient Medications  dextrose Oral Gel 15 Gram(s) Oral once PRN  sodium chloride 0.9% lock flush 10 milliLiter(s) IV Push every 1 hour PRN      REVIEW OF SYSTEMS  --------------------------------------------------------------------------------  Gen: No weight changes, fatigue, fevers/chills, weakness  Skin: No rashes  Head/Eyes/Ears/Mouth: No headache  Respiratory: No dyspnea, cough,  CV: No chest pain, orthopnea  GI: No abdominal pain, diarrhea, constipation, nausea, vomiting,  MSK: leg pain  Neuro: No dizziness/lightheadedness, weakness  Heme: No bleeding  Psych: No significant nervousness, anxiety, stress, depression    All other systems were reviewed and are negative, except as noted.    VITALS/PHYSICAL EXAM  --------------------------------------------------------------------------------  T(C): 36.9 (06-12-22 @ 08:00), Max: 36.9 (06-11-22 @ 22:02)  HR: 75 (06-12-22 @ 08:00) (57 - 75)  BP: 166/79 (06-12-22 @ 08:00) (116/56 - 166/79)  RR: 18 (06-12-22 @ 08:00) (18 - 18)  SpO2: 95% (06-12-22 @ 08:00) (95% - 100%)  Wt(kg): --        Physical Exam:  	Gen: NAD,   	HEENT:, supple neck,  	Pulm: CTA B/L  	CV: RRR, S1S2; no rub  	Abd: +BS, soft, nontender  	UE: Warm, intact strength; no asterixis  	LE: Warm, lt aka  	Neuro: No focal deficits  	Psych: Normal affect and mood  	Skin: Warm,  	Vascular access: IJ TDC, AVF    LABS/STUDIES  --------------------------------------------------------------------------------              9.1    7.32  >-----------<  278      [06-12-22 @ 06:29]              30.8     140  |  101  |  36.3  ----------------------------<  81      [06-12-22 @ 06:29]  4.8   |  27.0  |  3.74        Ca     8.9     [06-12-22 @ 06:29]      Mg     2.2     [06-12-22 @ 06:29]      Phos  4.2     [06-12-22 @ 06:29]            Iron 43, TIBC 249, %sat 17      [07-13-21 @ 16:07]  Ferritin 498      [07-13-21 @ 16:07]  HbA1c 4.9      [08-09-18 @ 05:54]  TSH 5.55      [06-03-22 @ 15:44]    HBsAg Nonreact      [05-16-22 @ 11:20]

## 2022-06-12 NOTE — PROGRESS NOTE ADULT - ASSESSMENT
86yo M POD 7 Left AKA. Patient tolerated procedure well. pt tolerated dialysis via PermCath he will be having a planned procedure for thrombectomy and possible revision of his left A-V fistula     Plan:    - cardiology called as per daughter request  - no new recommendations since H and H stable and BP stable   - Diet recommended per Speech and Swallow  specialist--  puree and no liquids to avoid  aspiration but daughter requested  regular   -Dressing with xeroform, gauze, abd, Kerlix, ace,  done daily   - Palliative  also onboard for his care  -PMR also following him   - working of his discharge  - Daily change of dressing   - Patient is going to have embolectomy possible revision of his A-V fistula possibly next week

## 2022-06-12 NOTE — PROGRESS NOTE ADULT - NUTRITIONAL ASSESSMENT
This patient has been assessed with a concern for Malnutrition and has been determined to have a diagnosis/diagnoses of Severe protein-calorie malnutrition.    This patient is being managed with:   Diet Regular-  Supplement Feeding Modality:  Oral  Nepro Cans or Servings Per Day:  1       Frequency:  Three Times a day  Entered: Jun 7 2022 10:56AM    
This patient has been assessed with a concern for Malnutrition and has been determined to have a diagnosis/diagnoses of Severe protein-calorie malnutrition.    This patient is being managed with:   Diet DASH/TLC-  Sodium & Cholesterol Restricted  Soft and Bite Sized (SOFTBTSZ)  For patients receiving Renal Replacement - No Protein Restr No Conc K No Conc Phos Low  Sodium (RENAL)  Supplement Feeding Modality:  Oral  Nepro Cans or Servings Per Day:  1       Frequency:  Three Times a day  Entered: Jun 4 2022 12:02AM    
This patient has been assessed with a concern for Malnutrition and has been determined to have a diagnosis/diagnoses of Severe protein-calorie malnutrition.    This patient is being managed with:   Diet Regular-  Supplement Feeding Modality:  Oral  Nepro Cans or Servings Per Day:  1       Frequency:  Three Times a day  Entered: Jun 7 2022 10:56AM    
This patient has been assessed with a concern for Malnutrition and has been determined to have a diagnosis/diagnoses of Severe protein-calorie malnutrition.    This patient is being managed with:   Diet Regular-  Entered: Jun 7 2022  8:55AM    
This patient has been assessed with a concern for Malnutrition and has been determined to have a diagnosis/diagnoses of Severe protein-calorie malnutrition.    This patient is being managed with:   Diet DASH/TLC-  Sodium & Cholesterol Restricted  Soft and Bite Sized (SOFTBTSZ)  For patients receiving Renal Replacement - No Protein Restr No Conc K No Conc Phos Low  Sodium (RENAL)  Supplement Feeding Modality:  Oral  Nepro Cans or Servings Per Day:  1       Frequency:  Three Times a day  Entered: Jun 4 2022 12:02AM    
This patient has been assessed with a concern for Malnutrition and has been determined to have a diagnosis/diagnoses of Severe protein-calorie malnutrition.    This patient is being managed with:   Diet DASH/TLC-  Sodium & Cholesterol Restricted  Consistent Carbohydrate {No Snacks} (CSTCHO)  Pureed (PUREED)  No Liquids (NOLIQUIDS)  Entered: Jun 5 2022  1:55PM    
This patient has been assessed with a concern for Malnutrition and has been determined to have a diagnosis/diagnoses of Severe protein-calorie malnutrition.    This patient is being managed with:   Diet DASH/TLC-  Sodium & Cholesterol Restricted  Soft and Bite Sized (SOFTBTSZ)  For patients receiving Renal Replacement - No Protein Restr No Conc K No Conc Phos Low  Sodium (RENAL)  Supplement Feeding Modality:  Oral  Nepro Cans or Servings Per Day:  1       Frequency:  Three Times a day  Entered: Jun 1 2022  1:46PM    
This patient has been assessed with a concern for Malnutrition and has been determined to have a diagnosis/diagnoses of Severe protein-calorie malnutrition.    This patient is being managed with:   Diet DASH/TLC-  Sodium & Cholesterol Restricted  Soft and Bite Sized (SOFTBTSZ)  For patients receiving Renal Replacement - No Protein Restr No Conc K No Conc Phos Low  Sodium (RENAL)  Supplement Feeding Modality:  Oral  Nepro Cans or Servings Per Day:  1       Frequency:  Three Times a day  Entered: Jun 4 2022 12:02AM    
This patient has been assessed with a concern for Malnutrition and has been determined to have a diagnosis/diagnoses of Severe protein-calorie malnutrition.    This patient is being managed with:   Diet NPO after Midnight-     NPO Start Date: 01-Jun-2022   NPO Start Time: 23:59  Entered: Jun 1 2022  4:32PM    Diet DASH/TLC-  Sodium & Cholesterol Restricted  Soft and Bite Sized (SOFTBTSZ)  For patients receiving Renal Replacement - No Protein Restr No Conc K No Conc Phos Low  Sodium (RENAL)  Supplement Feeding Modality:  Oral  Nepro Cans or Servings Per Day:  1       Frequency:  Three Times a day  Entered: Jun 1 2022  1:46PM    
This patient has been assessed with a concern for Malnutrition and has been determined to have a diagnosis/diagnoses of Severe protein-calorie malnutrition.    This patient is being managed with:   Diet Regular-  Supplement Feeding Modality:  Oral  Nepro Cans or Servings Per Day:  1       Frequency:  Three Times a day  Entered: Jun 7 2022 10:56AM

## 2022-06-12 NOTE — PROGRESS NOTE ADULT - ASSESSMENT
ESRD on HD  s/p Left AKA  Anemia of CKD      HD on Monday  Hb low/ stable- continue AMY with HD   Plan for OR for revision of his A-V fistula next week as per vascular

## 2022-06-13 NOTE — PROGRESS NOTE ADULT - ASSESSMENT
Patient tolerated HD for 3.5 hours with 1.5 liters of fluid removal.     Vital signs were stable during Tx. Post access care provided.     Received 1 Unit - PRBC,     Report given to a primary RN.

## 2022-06-13 NOTE — CHART NOTE - NSCHARTNOTESELECT_GEN_ALL_CORE
Event Note
Event Note
Nutrition Services
VASCULAR SURGERY/Event Note
Complaint call/Event Note
Downgrade/Transfer Note
Event Note
Event Note
Nutrition Services
SICU Upgrade Note/Event Note
SICU downgrade/Event Note
Vascular Surgery
Vascular Surgery
Vascular Surgery/Event Note
vascular Surgery

## 2022-06-13 NOTE — PROGRESS NOTE ADULT - SUBJECTIVE AND OBJECTIVE BOX
Patient was seen and evaluated on dialysis.   No c/o CP SOB NV  no F/C  no swelling    T(C): 37.1 (06-14-22 @ 05:33), Max: 38.6 (06-13-22 @ 19:30)  HR: 73 (06-14-22 @ 05:33) (61 - 88)  BP: 117/49 (06-14-22 @ 05:33) (112/55 - 152/77)  Wt(kg): -- NA  PE ;  NAD, Pale,   lungs - CTA  CV gr 1 murmur,  No gallop or rub  Abd : soft, NT BS +, No masses  Ext- No edema  Neuro : Grossly intact, Amputee,                         9.4    7.42  )-----------( 297      ( 14 Jun 2022 02:22 )             30.7      06-14    139  |  98  |  26.4<H>  ----------------------------<  87  4.3   |  28.0  |  2.71<H>    Ca    8.9      14 Jun 2022 02:22  Phos  3.1     06-14  Mg     2.0     06-14    MEDICATIONS  (STANDING):  acetaminophen     Tablet ..  acetaminophen   IVPB .. PRN  aspirin  chewable  atorvastatin  chlorhexidine 2% Cloths  dextrose 5%.  dextrose 5%.  dextrose 50% Injectable  dextrose 50% Injectable  dextrose 50% Injectable  dextrose Oral Gel PRN  epoetin juan-epbx (RETACRIT) Injectable  glucagon  Injectable  heparin   Injectable  ibuprofen  Tablet.  influenza  Vaccine (HIGH DOSE)  isosorbide   mononitrate ER Tablet (IMDUR)  lactated ringers.  melatonin  Nephro-pito  NIFEdipine XL  NIFEdipine XL  pantoprazole    Tablet  piperacillin/tazobactam IVPB..  polyethylene glycol 3350  QUEtiapine  sevelamer carbonate  sodium chloride 0.9% lock flush PRN  traZODone    Patient stable  Josh HD easily  Continue

## 2022-06-13 NOTE — CHART NOTE - NSCHARTNOTEFT_GEN_A_CORE
Source: Patient [ x]  Family [ ]   other [ ]    Current Diet: Diet, Regular:   Supplement Feeding Modality:  Oral  Nepro Cans or Servings Per Day:  1       Frequency:  Three Times a day (06-07-22 @ 10:56)    PO intake:  < 50% [ ]   50-75%  [x ]   %  [ ]  other :    Current Weight:   (6/10) 132.9 lbs  (6/8) 138.6 lbs  (5/4) 143.9 lbs  (5/31) 145.9 lbs  Obtain daily wts, continue to monitor. Wts trending down if accurate.   No edema noted    Pertinent Medications: MEDICATIONS  (STANDING):  acetaminophen     Tablet .. 650 milliGRAM(s) Oral every 6 hours  aspirin  chewable 81 milliGRAM(s) Oral daily  atorvastatin 80 milliGRAM(s) Oral at bedtime  chlorhexidine 2% Cloths 1 Application(s) Topical <User Schedule>  dextrose 5%. 1000 milliLiter(s) (100 mL/Hr) IV Continuous <Continuous>  dextrose 5%. 1000 milliLiter(s) (50 mL/Hr) IV Continuous <Continuous>  dextrose 50% Injectable 25 Gram(s) IV Push once  dextrose 50% Injectable 12.5 Gram(s) IV Push once  dextrose 50% Injectable 25 Gram(s) IV Push once  epoetin juan-epbx (RETACRIT) Injectable 39733 Unit(s) IV Push <User Schedule>  fentaNYL   Patch  12 MICROgram(s)/Hr 1 Patch Transdermal every 72 hours  glucagon  Injectable 1 milliGRAM(s) IntraMuscular once  heparin   Injectable 5000 Unit(s) SubCutaneous every 8 hours  influenza  Vaccine (HIGH DOSE) 0.7 milliLiter(s) IntraMuscular once  isosorbide   mononitrate ER Tablet (IMDUR) 30 milliGRAM(s) Oral daily  melatonin 3 milliGRAM(s) Oral at bedtime  Nephro-pito 1 Tablet(s) Oral daily  NIFEdipine XL 60 milliGRAM(s) Oral daily  NIFEdipine XL 90 milliGRAM(s) Oral at bedtime  pantoprazole    Tablet 40 milliGRAM(s) Oral every 12 hours  polyethylene glycol 3350 17 Gram(s) Oral every 12 hours  QUEtiapine 25 milliGRAM(s) Oral at bedtime  sevelamer carbonate 800 milliGRAM(s) Oral three times a day with meals  traZODone 100 milliGRAM(s) Oral at bedtime    MEDICATIONS  (PRN):  dextrose Oral Gel 15 Gram(s) Oral once PRN Blood Glucose LESS THAN 70 milliGRAM(s)/deciliter  sodium chloride 0.9% lock flush 10 milliLiter(s) IV Push every 1 hour PRN Pre/post blood products, medications, blood draw, and to maintain line patency    Pertinent Labs: CBC Full  -  ( 13 Jun 2022 06:52 )  WBC Count : 12.89 K/uL  RBC Count : 3.02 M/uL  Hemoglobin : 9.2 g/dL  Hematocrit : 30.3 %  Platelet Count - Automated : 327 K/uL  Mean Cell Volume : 100.3 fl  Mean Cell Hemoglobin : 30.5 pg  Mean Cell Hemoglobin Concentration : 30.4 gm/dL  Auto Neutrophil # : x  Auto Lymphocyte # : x  Auto Monocyte # : x  Auto Eosinophil # : x  Auto Basophil # : x  Auto Neutrophil % : x  Auto Lymphocyte % : x  Auto Monocyte % : x  Auto Eosinophil % : x  Auto Basophil % : x  06-13 Na139 mmol/L Glu 110 mg/dL<H> K+ 5.1 mmol/L Cr  4.41 mg/dL<H> BUN 50.8 mg/dL<H> Phos 4.5 mg/dL Alb n/a   PAB n/a       Skin: no breakdown noted    Nutrition focused physical exam previously conducted - found signs of malnutrition [ ]absent [ x]present    Subcutaneous fat loss: [x ] Orbital fat pads region, [ x]Buccal fat region, [x ]Triceps region,  [x ]Ribs region    Muscle wasting: [x ]Temples region, [ x]Clavicle region, [x ]Shoulder region, [ ]Scapula region, [ ]Interosseous region,  [ ]thigh region, [ ]Calf region    Estimated Needs:   [x ] no change since previous assessment  [ ] recalculated:     Current Nutrition Diagnosis: Pt remains at high nutrition risk and meets criteria for severe chronic malnutrition related to inability to meet sufficient protein energy needs in setting of advanced age w/ ESRD on HD, admitted w/ hemorrhagic shock as evidence by meeting less then 75% est needs > 1mo, physical signs of severe muscle/fat loss. 1:1 present during assessment, reported pt with good po intake this morning. Consumed ~75% of breakfast. Pt is enjoying Nepro. Last documented BM 6/11. K+/phos/Ca WNL. RD to remain available.     Recommendations:   1) Continue Nepro TID to optimize po intake and provide an additional 425 kcal, 19.1g protein per serving  2) Continue nephro-pito daily  3) Monitor wts and labs    Monitoring and Evaluation:   [x ] PO intake [ x] Tolerance to diet prescription [X] Weights  [X] Follow up per protocol [X] Labs:

## 2022-06-13 NOTE — CHART NOTE - NSCHARTNOTEFT_GEN_A_CORE
I spoke by phone with Vanessa through a vascular floor call. She presented as a  and family member of Mr. King. She asked if a was the vascular resident, which a responded no. I presented myself as the on-call team Dr. Lima. She stated she had several complaints to make. She stated that she noticed today Mr. King was short of breath after dialysis , and his oral temperature afterward  as 97F. She requested to the nursing team to take anal temperature that was 100F.   The rational of Miss Mendez  call was to place a complain on his father having Short of breath after dialysis, raise in temperature after dialysis and she requested a chest x ray to rule out any condition that "can be bruin" before going to another facility. Miss Mendez states she is going to call Mr. King vascular Doctor, Ambrocio to discuss further.    I provided this information to my shift resident and on call team.    I proceeded to examine Mr. King. He is status post dialysis today; he was dialyses 1.5Liters of fluids.  Mr. King is yelling words as he has some dementia. He interact with me and asked what I am going to do. I explained I will examine his lungs. I took his vitals  His RR is 32 HR 68, Saturation 100% at 3L nasal canula. Rectal temperature reports 101.2 F.  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 His abdomen is soft, he is cachectic, his scrotums are edematous and has superficial abrasion. Mr. King has contractural body habitus and is difficult to move him. Lung: Clear breath sound bilaterally. No wheezes, no other aggregates. There is mild suprasternal retractions attributable to his current temperature probably related to his newly vascular volume due to dialysis.   I communicate my finding to the family member at bed side.     Plan:  Since patient has dialysis today, patient will be treated with acetaminophen to give comfort to patient.   Monitor sings os dehydration.  We will monitor vitals.  Reinforce the use of incentive spirometry.  Ordered chest xray. I spoke by phone with Vanessa through a vascular floor call. She presented as a  and family member of Mr. King. She asked if a was the vascular resident, which a responded no. I presented myself as the on-call team Dr. Lima. She stated she had several complaints to make. She stated that she noticed today Mr. King was short of breath after dialysis , and his oral temperature afterward  as 97F. She requested to the nursing team to take anal temperature that was 100F.   The rational of Miss Mendez  call was to place a complain on his father having Short of breath after dialysis, raise in temperature after dialysis and she requested a chest x ray to rule out any condition that "can be bruin" before going to another facility. Miss Mendez states she is going to call Mr. King vascular Doctor, Ambrocio to discuss further.    I provided this information to my shift resident and on call team.    I proceeded to examine Mr. King. He is status post dialysis today; he was dialyses 1.5Liters of fluids.  Mr. King is yelling words as he has some dementia. He interact with me and asked what I am going to do. I explained I will examine his lungs. I took his vitals  His RR is 32 HR 68, Saturation 100% at 3L nasal canula /55 mmHg.  Rectal temperature reports 101.2 F.  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 His abdomen is soft, he is cachectic, his scrotums are edematous and has superficial abrasion. Mr. King has contractural body habitus and is difficult to move him. Lung: Clear breath sound bilaterally. No wheezes, no other aggregates. There is mild suprasternal retractions attributable to his current temperature probably related to his newly vascular volume due to dialysis.   I communicate my finding to the family member at bed side.     Plan:  Since patient has dialysis today, patient will be treated with acetaminophen to give comfort to patient.   Monitor sings os dehydration.  We will monitor vitals.  Reinforce the use of incentive spirometry.  Ordered chest xray. I spoke by phone with Vanessa through a vascular floor call. She presented as a  and family member of Mr. King. She asked if a was the vascular resident, which a responded no. I presented myself as the on-call team Dr. Lima. She stated she had several complaints to make. She stated that she noticed today Mr. King was short of breath after dialysis , and his oral temperature afterward  as 97F. She requested to the nursing team to take anal temperature that was 100F.   The rational of Miss Mendez  call was to place a complain on his father having Short of breath after dialysis, raise in temperature after dialysis and she requested a chest x ray to rule out any condition that "can be brewing" before going to another facility. Miss Mendez states she is going to call Mr. King vascular Doctor, Ambrocio to discuss further.    I provided this information to my shift resident and on call team, and plan was discussed.    I proceeded to examine Mr. King. He is status post dialysis today; he was dialyses 1.5Liters of fluids.  Mr. King is yelling words as he has some dementia. He interact with me and asked what I am going to do. I explained I will examine his lungs. I took his vitals  His RR is 32 HR 68, Saturation 100% at 3L nasal canula /55 mmHg.  Rectal temperature reports 101.2 F.  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 His abdomen is soft, he is cachectic, his scrotums are edematous and has superficial abrasion. Mr. King has contractural body habitus and is difficult to move him. Lung: Clear breath sound bilaterally. No wheezes, no other aggregates. There is mild suprasternal retractions attributable to his current temperature probably related to his newly vascular volume due to dialysis.   I communicate my finding to the family member at bed side.     Plan:  Since patient has dialysis today, patient will be treated with acetaminophen to give comfort to patient.   Monitor sings os dehydration.  We will monitor vitals.  Reinforce the use of incentive spirometry.  Ordered chest x-ray. I spoke by phone with Vanessa through a vascular floor call. She presented as a  and family member of Mr. King. She asked if a was the vascular resident, which a responded no. I presented myself as the on-call team Dr. Lima. She stated she had several complaints to make. She stated that she noticed today Mr. King was short of breath after dialysis , and his oral temperature afterward  as 97F. She requested to the nursing team to take anal temperature that was 100F.   The rational of Miss Mendez  call was to place a complain on his father having Short of breath after dialysis, raise in temperature after dialysis and she requested a chest x ray to rule out any condition that "can be brewing" before going to another facility. Miss Mendez states she is going to call Mr. King vascular Doctor, Ambrocio to discuss further.    I provided this information to my shift resident and on call team, and plan was discussed.    I proceeded to examine Mr. Knig. He is status post dialysis today; he was dialyses 1.5Liters of fluids.  Mr. King is yelling words as he has some dementia. He interact with me and asked what I am going to do. I explained I will examine his lungs. I took his vitals  His RR is 32 HR 68, Saturation 100% at 3L nasal canula /55 mmHg.  Rectal temperature reports 101.2 F.  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 His abdomen is soft, he is cachectic, his scrotums are edematous and has superficial abrasion. Mr. King has contractural body habitus and is difficult to move him. Lung: Clear breath sound bilaterally. No wheezes, no other aggregates. There is mild suprasternal retractions attributable to his current temperature probably related to his newly vascular volume due to dialysis.   I communicate my finding to the family member at bed side.     Plan:  Patient received 1 PRBC before dialysis at 3:45 PM in the Hd area and was well tolerated. No fever.  Since patient has dialysis today--1.5 L removed and tolerated well; patient will be treated with acetaminophen to give comfort to patient.   Monitor sings os dehydration.  We will monitor vitals.  Reinforce the use of incentive spirometry.  Ordered chest x-ray.  Fu status of HD catheter as a cause of fever.

## 2022-06-13 NOTE — PROGRESS NOTE ADULT - SUBJECTIVE AND OBJECTIVE BOX
Chief Complaint: ESRD/Ongoing hemodialysis requirement    24 hour events/subjective:  no acute event  on 1;1- calm  pt seen and examined; c/o right leg pain    PAST HISTORY  --------------------------------------------------------------------------------  No significant changes to PMH, PSH, FHx, SHx, unless otherwise noted    ALLERGIES & MEDICATIONS  --------------------------------------------------------------------------------  Allergies    Plavix (Hives)  Toprol-XL (Rash)    Intolerances    Standing Inpatient Medications  acetaminophen     Tablet .. 650 milliGRAM(s) Oral every 6 hours  aspirin  chewable 81 milliGRAM(s) Oral daily  atorvastatin 80 milliGRAM(s) Oral at bedtime  chlorhexidine 2% Cloths 1 Application(s) Topical <User Schedule>  dextrose 5%. 1000 milliLiter(s) IV Continuous <Continuous>  dextrose 5%. 1000 milliLiter(s) IV Continuous <Continuous>  dextrose 50% Injectable 25 Gram(s) IV Push once  dextrose 50% Injectable 12.5 Gram(s) IV Push once  dextrose 50% Injectable 25 Gram(s) IV Push once  epoetin juan-epbx (RETACRIT) Injectable 97604 Unit(s) IV Push <User Schedule>  fentaNYL   Patch  12 MICROgram(s)/Hr 1 Patch Transdermal every 72 hours  glucagon  Injectable 1 milliGRAM(s) IntraMuscular once  heparin   Injectable 5000 Unit(s) SubCutaneous every 8 hours  influenza  Vaccine (HIGH DOSE) 0.7 milliLiter(s) IntraMuscular once  isosorbide   mononitrate ER Tablet (IMDUR) 30 milliGRAM(s) Oral daily  melatonin 3 milliGRAM(s) Oral at bedtime  Nephro-pito 1 Tablet(s) Oral daily  NIFEdipine XL 90 milliGRAM(s) Oral daily  pantoprazole    Tablet 40 milliGRAM(s) Oral every 12 hours  polyethylene glycol 3350 17 Gram(s) Oral every 12 hours  QUEtiapine 25 milliGRAM(s) Oral at bedtime  sevelamer carbonate 800 milliGRAM(s) Oral three times a day with meals  traZODone 100 milliGRAM(s) Oral at bedtime    PRN Inpatient Medications  dextrose Oral Gel 15 Gram(s) Oral once PRN  sodium chloride 0.9% lock flush 10 milliLiter(s) IV Push every 1 hour PRN    REVIEW OF SYSTEMS  --------------------------------------------------------------------------------  Gen: No weight changes, fatigue, fevers/chills, weakness  Skin: No rashes  Head/Eyes/Ears/Mouth: No headache  Respiratory: No dyspnea, cough,  CV: No chest pain, orthopnea  GI: No abdominal pain, diarrhea, constipation, nausea, vomiting,  MSK: leg pain  Neuro: No dizziness/lightheadedness, weakness  Heme: No bleeding  Psych: No significant nervousness, anxiety, stress, depression    All other systems were reviewed and are negative, except as noted.    VITALS/PHYSICAL EXAM:    ICU Vital Signs Last 48 Hrs  T(C): 36.8 (2022 07:35), Max: 37.1 (2022 21:42)  T(F): 98.2 (2022 07:35), Max: 98.8 (2022 21:42)  HR: 67 (2022 07:35) (62 - 69)  BP: 174/66 (2022 07:35) (133/64 - 174/66)  RR: 18 (2022 07:35) (17 - 20)  SpO2: 95% (2022 07:35) (95% - 100%)    --------------------------------------------------------------------------------  T(C): 36.9 (22 @ 08:00), Max: 36.9 (22 @ 22:02)  HR: 75 (22 @ 08:00) (57 - 75)  BP: 166/79 (22 @ 08:00) (116/56 - 166/79)  RR: 18 (22 @ 08:00) (18 - 18)  SpO2: 95% (22 @ 08:00) (95% - 100%)    Physical Exam:  	Gen: NAD,   	HEENT:, supple neck,  	Pulm: CTA B/L  	CV: RRR, S1S2; no rub  	Abd: +BS, soft, nontender  	UE: Warm, intact strength; no asterixis  	LE: Warm, lt aka  	Neuro: No focal deficits  	Psych: Normal affect and mood  	Skin: Warm,  	Vascular access: IJ TDC, AVF    LABS/STUDIES:    139    |  99     |  50.8<H>  ----------------------------<  110<H>  Ca:9.3   (2022 06:52)  5.1     |  25.0   |  4.41<H>                        9.2<L>  12.89<H> )-----------( 327      ( 2022 06:52 )             30.3<L>    Phos:4.5 mg/dL M.3 mg/dL PTH:-- Uric acid:-- Serum Osm:--  Ferritin:-- Iron:-- TIBC:-- Tsat:--  B12:-- TSH:-- ( @ 06:52)    --------------------------------------------------------------------------------              9.1    7.32  >-----------<  278      [22 @ 06:29]              30.8     140  |  101  |  36.3  ----------------------------<  81      [22 @ 06:29]  4.8   |  27.0  |  3.74        Ca     8.9     [22 @ 06:29]      Mg     2.2     [22 @ 06:29]      Phos  4.2     [22 @ 06:29]    Iron 43, TIBC 249, %sat 17      [21 @ 16:07]  Ferritin 498      [21 @ 16:07]  HbA1c 4.9      [18 @ 05:54]  TSH 5.55      [22 @ 15:44]    HBsAg Nonreact      [22 @ 11:20]    TTE Echo Complete w/ Contrast w/ Doppler (22 @ 10:52)     ACC: [id number] EXAM:  ECHO TTE WITH CON COMP W DOPP                          PROCEDURE DATE:  2022      INTERPRETATION:  TRANSTHORACIC ECHOCARDIOGRAM REPORT    Patient Name:   CHRISTIANO ELIZABETH Patient Location: Inpatient  Medical Rec #:  [id number]    Accession #:      [id number]  Account #:                    Height:           65.0 in 165.1 cm  YOB: 1934     Weight:           149.9 lb 68.00 kg  Patient Age:    88 years      BSA:              1.75 m²  Patient Gender: M          BP:               96/71 mmHg      Date of Exam:        2022 10:52:52 AM  Sonographer:         Stephanie Johnson  Referring Physician: Josh Nix    Procedure:   2D Echo/Doppler/Color Doppler Complete.  Indications: Heart failure, unspecified - I50.9  Diagnosis:   Heart failure, unspecified - I50.9    2D AND M-MODE MEASUREMENTS (normal ranges within parentheses):  Left                 Normal   Aorta/Left            Normal  Ventricle:                    Atrium:  IVSd (2D):    1.28  (0.7-1.1) Left Atrium    4.19  (1.9-4.0)                 cm             (2D):           cm  LVPWd (2D):   1.24  (0.7-1.1) LA Volume      82.2                 cm             Index         ml/m²  LVIDd (2D):   4.26  (3.4-5.7)                 cm  LVIDs (2D):   2.43                 cm  LV FS (2D):   42.9   (>25%)                  %  Relative Wall 0.58   (<0.42)  Thickness    SPECTRAL DOPPLER ANALYSIS (where applicable):  Mitral Valve:  MV Mean Grad: 8.4 mmHg    Aortic Valve: AoV Max Chuy: 2.78 m/s AoV Peak P.0 mmHg AoV Mean PG:   15.2 mmHg    LVOT Vmax: 1.07 m/s LVOT VTI: 0.208 m LVOT Diameter:    AoV Area, Vmax:  AoV Area, VTI:  AoV Area, Vmn:  Ao VTI: 0.581  Tricuspid Valve and PA/RV Systolic Pressure: TR Max Velocity: 3.76 m/s RA   Pressure: 3 mmHg RVSP/PASP: 59.4 mmHg    PHYSICIAN INTERPRETATION:  Left Ventricle: The left ventricular internal cavity size is normal. Left   ventricular wall thickness is mildly increased.  Global LV systolic function was hyperdynamic. Left ventricular ejection   fraction, by visual estimation, is 70 to 75%. Abnormal (paradoxical)   septal motion consistent with post-operative status. Mid cavity   obliteration.  Right Ventricle: Normal right ventricular size and function.  Left Atrium: Severelyenlarged left atrium.  Right Atrium: Moderately enlarged right atrium.  Pericardium: There is no evidence of pericardial effusion. There is a   small pleural effusion in the right lateral region.  Mitral Valve: Thickening of the anterior and posterior mitral valve   leaflets. There is mild to moderate mitral annular calcification.   Moderate mitral valve regurgitation is seen.  Tricuspid Valve: Structurally normal tricuspid valve, with normal leaflet   excursion. Mild-moderate tricuspid regurgitation is visualized. Estimated   pulmonary artery systolic pressure is 59.4 mmHg assuming a right atrial   pressure of 3 mmHg, which is consistent with moderate pulmonary   hypertension.  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 No evidence of aortic valve   regurgitation is seen.  Pulmonic Valve: Structurally normal pulmonic valve, with normal leaflet   excursion. Trace pulmonic valve regurgitation.  Pulmonary Artery: The main pulmonary artery is normal in size.  Venous: The inferior vena cava is normal. The inferior vena cava was   normal sized, with respiratory size variation greater than 50%. The   inferior vena cava and the hepatic vein show a normal flow pattern.  In comparison to the previous echocardiogram(s): Prior examinations are   available and were reviewed for comparison purposes. Compared to prior   imaging on 2022, the left ventricular function remains unchanged.    Summary:   1. Left ventricular ejection fraction, by visual estimation, is 70 to   75%.   2. Hyperdynamic global left ventricular systolic function.   3. Severely enlarged left atrium.   4. Abnormal septal motion consistent with post-operative status.   5. Mildly increased LV wall thickness.   6. Mid cavity obliteration.   7. Moderately enlarged right atrium.   8. Mild-moderate tricuspid regurgitation.   9. Estimated pulmonary artery systolic pressure is 59.4 mmHg assuming a   right atrial pressure of 3 mmHg, which is consistent with moderate   pulmonary hypertension.  10. Mild to moderate mitral annular calcification.  11. Moderate mitral valve regurgitation.  12. Thickening of the anterior and posterior mitral valve leaflets.  13. 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 This appears normal functioning   with peak velocity of 2.61 m/s with no evidence of para-valvular or   intravalvular regurgitation.  14. Compared to prior imaging on 2022, the left ventricular function   remains unchanged.    MD Perlita Electronically signed on 2022 at 7:07:01 PM

## 2022-06-13 NOTE — PROGRESS NOTE ADULT - ASSESSMENT
ESRD on HD  s/p Left AKA  Anemia of CKD    Hb low/ stable- continue AMY with HD, PRBC Transfusion,      Plan for OR for revision of his A-V fistula next week as per vascular Surgery,     Patient was seen and evaluated on dialysis.   Patient is tolerating the procedure well.   T(C): 36.8 (06-13-22 @ 07:35), Max: 37.1 (06-12-22 @ 21:42)  HR: 67 (06-13-22 @ 07:35) (62 - 69)  BP: 174/66 (06-13-22 @ 07:35) (133/64 - 174/66)  Continue dialysis: TIW  Dialyzer:   R-300       QB: 400 ml.,        QD: 500ml.,  Goal UF _1.L _ over _3_ Hours     Pt is seen and examined on dialysis. No symptoms. Hemodynamics stable. Tolerating dialysis and ultrafiltration.  Pre Laboratory values personally reviewed by me.  Dialysis adjusted appropriately based on current values.  Will continue the current medical management.  Next hemodialysis as scheduled.  Discussed with nursing, primary care team.

## 2022-06-14 NOTE — PROGRESS NOTE ADULT - SUBJECTIVE AND OBJECTIVE BOX
Chief Complaint: ESRD/Ongoing hemodialysis requirement    24 hour events/subjective:  no acute event  on 1;1- calm  pt seen and examined; c/o right leg pain    PAST HISTORY  --------------------------------------------------------------------------------  No significant changes to PMH, PSH, FHx, SHx, unless otherwise noted    ALLERGIES & MEDICATIONS  --------------------------------------------------------------------------------  Allergies    Plavix (Hives)  Toprol-XL (Rash)    Intolerances    Standing Inpatient Medications  acetaminophen     Tablet .. 650 milliGRAM(s) Oral every 6 hours  aspirin  chewable 81 milliGRAM(s) Oral daily  atorvastatin 80 milliGRAM(s) Oral at bedtime  chlorhexidine 2% Cloths 1 Application(s) Topical <User Schedule>  dextrose 5%. 1000 milliLiter(s) IV Continuous <Continuous>  dextrose 5%. 1000 milliLiter(s) IV Continuous <Continuous>  dextrose 50% Injectable 25 Gram(s) IV Push once  dextrose 50% Injectable 12.5 Gram(s) IV Push once  dextrose 50% Injectable 25 Gram(s) IV Push once  epoetin juan-epbx (RETACRIT) Injectable 47886 Unit(s) IV Push <User Schedule>  fentaNYL   Patch  12 MICROgram(s)/Hr 1 Patch Transdermal every 72 hours  glucagon  Injectable 1 milliGRAM(s) IntraMuscular once  heparin   Injectable 5000 Unit(s) SubCutaneous every 8 hours  influenza  Vaccine (HIGH DOSE) 0.7 milliLiter(s) IntraMuscular once  isosorbide   mononitrate ER Tablet (IMDUR) 30 milliGRAM(s) Oral daily  melatonin 3 milliGRAM(s) Oral at bedtime  Nephro-pito 1 Tablet(s) Oral daily  NIFEdipine XL 90 milliGRAM(s) Oral daily  pantoprazole    Tablet 40 milliGRAM(s) Oral every 12 hours  polyethylene glycol 3350 17 Gram(s) Oral every 12 hours  QUEtiapine 25 milliGRAM(s) Oral at bedtime  sevelamer carbonate 800 milliGRAM(s) Oral three times a day with meals  traZODone 100 milliGRAM(s) Oral at bedtime    PRN Inpatient Medications  dextrose Oral Gel 15 Gram(s) Oral once PRN  sodium chloride 0.9% lock flush 10 milliLiter(s) IV Push every 1 hour PRN    REVIEW OF SYSTEMS  --------------------------------------------------------------------------------  Gen: No weight changes, fatigue, fevers/chills, weakness  Skin: No rashes  Head/Eyes/Ears/Mouth: No headache  Respiratory: No dyspnea, cough,  CV: No chest pain, orthopnea  GI: No abdominal pain, diarrhea, constipation, nausea, vomiting,  MSK: leg pain  Neuro: No dizziness/lightheadedness, weakness  Heme: No bleeding  Psych: No significant nervousness, anxiety, stress, depression    All other systems were reviewed and are negative, except as noted.    VITALS/PHYSICAL EXAM:    ICU Vital Signs Last 48 Hrs  T(C): 36.8 (2022 07:35), Max: 37.1 (2022 21:42)  T(F): 98.2 (2022 07:35), Max: 98.8 (2022 21:42)  HR: 67 (2022 07:35) (62 - 69)  BP: 174/66 (2022 07:35) (133/64 - 174/66)  RR: 18 (2022 07:35) (17 - 20)  SpO2: 95% (2022 07:35) (95% - 100%)    --------------------------------------------------------------------------------  T(C): 36.9 (22 @ 08:00), Max: 36.9 (22 @ 22:02)  HR: 75 (22 @ 08:00) (57 - 75)  BP: 166/79 (22 @ 08:00) (116/56 - 166/79)  RR: 18 (22 @ 08:00) (18 - 18)  SpO2: 95% (22 @ 08:00) (95% - 100%)    Physical Exam:  	Gen: NAD,   	HEENT:, supple neck,  	Pulm: CTA B/L  	CV: RRR, S1S2; no rub  	Abd: +BS, soft, nontender  	UE: Warm, intact strength; no asterixis  	LE: Warm, lt aka  	Neuro: No focal deficits  	Psych: Normal affect and mood  	Skin: Warm,  	Vascular access: IJ TDC, AVF    LABS/STUDIES:    139    |  99     |  50.8<H>  ----------------------------<  110<H>  Ca:9.3   (2022 06:52)  5.1     |  25.0   |  4.41<H>                        9.2<L>  12.89<H> )-----------( 327      ( 2022 06:52 )             30.3<L>    Phos:4.5 mg/dL M.3 mg/dL PTH:-- Uric acid:-- Serum Osm:--  Ferritin:-- Iron:-- TIBC:-- Tsat:--  B12:-- TSH:-- ( @ 06:52)    --------------------------------------------------------------------------------              9.1    7.32  >-----------<  278      [22 @ 06:29]              30.8     140  |  101  |  36.3  ----------------------------<  81      [22 @ 06:29]  4.8   |  27.0  |  3.74        Ca     8.9     [22 @ 06:29]      Mg     2.2     [22 @ 06:29]      Phos  4.2     [22 @ 06:29]    Iron 43, TIBC 249, %sat 17      [21 @ 16:07]  Ferritin 498      [21 @ 16:07]  HbA1c 4.9      [18 @ 05:54]  TSH 5.55      [22 @ 15:44]    HBsAg Nonreact      [22 @ 11:20]    TTE Echo Complete w/ Contrast w/ Doppler (22 @ 10:52)     ACC: [id number] EXAM:  ECHO TTE WITH CON COMP W DOPP                          PROCEDURE DATE:  2022      INTERPRETATION:  TRANSTHORACIC ECHOCARDIOGRAM REPORT    Patient Name:   CHRISTIANO ELIZABETH Patient Location: Inpatient  Medical Rec #:  [id number]    Accession #:      [id number]  Account #:                    Height:           65.0 in 165.1 cm  YOB: 1934     Weight:           149.9 lb 68.00 kg  Patient Age:    88 years      BSA:              1.75 m²  Patient Gender: M          BP:               96/71 mmHg      Date of Exam:        2022 10:52:52 AM  Sonographer:         Stephanie Johnson  Referring Physician: Josh Nix    Procedure:   2D Echo/Doppler/Color Doppler Complete.  Indications: Heart failure, unspecified - I50.9  Diagnosis:   Heart failure, unspecified - I50.9    2D AND M-MODE MEASUREMENTS (normal ranges within parentheses):  Left                 Normal   Aorta/Left            Normal  Ventricle:                    Atrium:  IVSd (2D):    1.28  (0.7-1.1) Left Atrium    4.19  (1.9-4.0)                 cm             (2D):           cm  LVPWd (2D):   1.24  (0.7-1.1) LA Volume      82.2                 cm             Index         ml/m²  LVIDd (2D):   4.26  (3.4-5.7)                 cm  LVIDs (2D):   2.43                 cm  LV FS (2D):   42.9   (>25%)                  %  Relative Wall 0.58   (<0.42)  Thickness    SPECTRAL DOPPLER ANALYSIS (where applicable):  Mitral Valve:  MV Mean Grad: 8.4 mmHg    Aortic Valve: AoV Max Chuy: 2.78 m/s AoV Peak P.0 mmHg AoV Mean PG:   15.2 mmHg    LVOT Vmax: 1.07 m/s LVOT VTI: 0.208 m LVOT Diameter:    AoV Area, Vmax:  AoV Area, VTI:  AoV Area, Vmn:  Ao VTI: 0.581  Tricuspid Valve and PA/RV Systolic Pressure: TR Max Velocity: 3.76 m/s RA   Pressure: 3 mmHg RVSP/PASP: 59.4 mmHg    PHYSICIAN INTERPRETATION:  Left Ventricle: The left ventricular internal cavity size is normal. Left   ventricular wall thickness is mildly increased.  Global LV systolic function was hyperdynamic. Left ventricular ejection   fraction, by visual estimation, is 70 to 75%. Abnormal (paradoxical)   septal motion consistent with post-operative status. Mid cavity   obliteration.  Right Ventricle: Normal right ventricular size and function.  Left Atrium: Severelyenlarged left atrium.  Right Atrium: Moderately enlarged right atrium.  Pericardium: There is no evidence of pericardial effusion. There is a   small pleural effusion in the right lateral region.  Mitral Valve: Thickening of the anterior and posterior mitral valve   leaflets. There is mild to moderate mitral annular calcification.   Moderate mitral valve regurgitation is seen.  Tricuspid Valve: Structurally normal tricuspid valve, with normal leaflet   excursion. Mild-moderate tricuspid regurgitation is visualized. Estimated   pulmonary artery systolic pressure is 59.4 mmHg assuming a right atrial   pressure of 3 mmHg, which is consistent with moderate pulmonary   hypertension.  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 No evidence of aortic valve   regurgitation is seen.  Pulmonic Valve: Structurally normal pulmonic valve, with normal leaflet   excursion. Trace pulmonic valve regurgitation.  Pulmonary Artery: The main pulmonary artery is normal in size.  Venous: The inferior vena cava is normal. The inferior vena cava was   normal sized, with respiratory size variation greater than 50%. The   inferior vena cava and the hepatic vein show a normal flow pattern.  In comparison to the previous echocardiogram(s): Prior examinations are   available and were reviewed for comparison purposes. Compared to prior   imaging on 2022, the left ventricular function remains unchanged.    Summary:   1. Left ventricular ejection fraction, by visual estimation, is 70 to   75%.   2. Hyperdynamic global left ventricular systolic function.   3. Severely enlarged left atrium.   4. Abnormal septal motion consistent with post-operative status.   5. Mildly increased LV wall thickness.   6. Mid cavity obliteration.   7. Moderately enlarged right atrium.   8. Mild-moderate tricuspid regurgitation.   9. Estimated pulmonary artery systolic pressure is 59.4 mmHg assuming a   right atrial pressure of 3 mmHg, which is consistent with moderate   pulmonary hypertension.  10. Mild to moderate mitral annular calcification.  11. Moderate mitral valve regurgitation.  12. Thickening of the anterior and posterior mitral valve leaflets.  13. 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 This appears normal functioning   with peak velocity of 2.61 m/s with no evidence of para-valvular or   intravalvular regurgitation.  14. Compared to prior imaging on 2022, the left ventricular function   remains unchanged.    MD Perlita Electronically signed on 2022 at 7:07:01 PM    Pt was  seen and examined on dialysis. No symptoms. Hemodynamics stable. Tolerated  dialysis and ultrafiltration.  Pre Laboratory values personally reviewed by me.  Dialysis adjusted appropriately based on current values.  Will continue the current medical management.  Next hemodialysis as scheduled.  Discussed with nursing, primary care team.

## 2022-06-14 NOTE — PROGRESS NOTE ADULT - ASSESSMENT
88yo M POD 10 Left AKA. Patient tolerated procedure well. patient has  tolerated dialysis via PermCath   He shad fever overnight work up showed   Chest x ray: no evidence of pneumonia   Urinalysis  no evidence of urinary tract infection   wound AKA , no evidence of erythema, no purulence or necrotic tissue,  stapled line looks healthy   There is not elevation of white blood cells but there is shift of neutrophils   Patient has not had more documented  fevers since yesterday midnight   Overnight team started Zosyn last night as a broad spectrum antibiotic   We continue to monitor closelly Mr King clinically of any fever,  or any further work up is warranted      Plan:    - cardiology called as per daughter request  - no new recommendations since H and H stable and BP stable   - Diet recommended per Speech and Swallow  specialist--  puree and no liquids to avoid  aspiration but daughter requested  regular   -Dressing with island dressing  done daily   - Palliative  also onboard for his care  -PMR also following him   - Daily change of dressing   Nephology rec hold of the AVF revision for now     Patient was seen and examined with the Senior resident Dr. Holguin and discussed with the Vascular Team of attendings. 88yo M POD 10 Left AKA. Patient tolerated procedure well. patient has  tolerated dialysis via PermCath   He shad fever overnight work up showed   Chest x ray: no evidence of pneumonia   Urinalysis  no evidence of urinary tract infection   wound AKA , no evidence of erythema, no purulence or necrotic tissue,  stapled line looks healthy   There is not elevation of white blood cells but there is shift of neutrophils   Patient has not had more documented  fevers since yesterday midnight   Overnight team started Zosyn last night as a broad spectrum antibiotic   We continue to monitor closely Mr King clinically of any fever,  or any further work up is warranted      Plan:  -Monitor vitals signs   -continue constant observation since the patient  gets confused at night  and sundowns   - cardiology was  called as per daughter request  - no new recommendations since H and H stable and BP stable   - Diet recommended per Speech and Swallow  specialist--  puree and no liquids to avoid  aspiration but daughter requested  regular   -Dressing with island dressing  done daily   - Palliative  also onboard for his care  -PMR also following him   - Daily change of dressing   Nephology rec hold of the AVF revision for now     Patient was seen and examined with the Senior resident Dr. Holguin and discussed with the Vascular Team of attendings.

## 2022-06-14 NOTE — PROGRESS NOTE ADULT - ASSESSMENT
Case with many social complexities. In all cases, the surrogate’s assessment of the patient’s best interests shall be patient-centered; health care decisions shall be made on an individualized basis and shall be consistent with the values of the patient, including the patient’s Judaism and moral beliefs, to the extent reasonably possible.    The team should communicate with the patient’s family and explain the clinical trajectory and plan of care, which will help greatly to ameliorate their concerns regarding the patient’s care. The patient, if able, should participate.     Ethics remains available to assist with further discussions.     Denisse Minor MD  Ethics Attending  [phone]

## 2022-06-14 NOTE — PROGRESS NOTE ADULT - ASSESSMENT
ESRD - HD    Amputee ( L - AKA )     Rec : HD in AM,     S/P PRBC TX., on AMY,     Euvolemic weight : 59 Kg.,     Will Hold AVF Revision for now,

## 2022-06-14 NOTE — PROGRESS NOTE ADULT - SUBJECTIVE AND OBJECTIVE BOX
Vascular  Surgery Progress Note:  Patient had fever overnight   Work up for fever in ongoing   Pain well controlled.    Diet, Regular:   Supplement Feeding Modality:  Oral  Nepro Cans or Servings Per Day:  1       Frequency:  Three Times a day (22 @ 10:56)      Scheduled Medications:   acetaminophen     Tablet .. 650 milliGRAM(s) Oral every 6 hours  aspirin  chewable 81 milliGRAM(s) Oral daily  atorvastatin 80 milliGRAM(s) Oral at bedtime  chlorhexidine 2% Cloths 1 Application(s) Topical <User Schedule>  dextrose 5%. 1000 milliLiter(s) (50 mL/Hr) IV Continuous <Continuous>  dextrose 5%. 1000 milliLiter(s) (100 mL/Hr) IV Continuous <Continuous>  dextrose 50% Injectable 25 Gram(s) IV Push once  dextrose 50% Injectable 12.5 Gram(s) IV Push once  dextrose 50% Injectable 25 Gram(s) IV Push once  epoetin juan-epbx (RETACRIT) Injectable 67703 Unit(s) IV Push <User Schedule>  glucagon  Injectable 1 milliGRAM(s) IntraMuscular once  heparin   Injectable 5000 Unit(s) SubCutaneous every 8 hours  influenza  Vaccine (HIGH DOSE) 0.7 milliLiter(s) IntraMuscular once  isosorbide   mononitrate ER Tablet (IMDUR) 30 milliGRAM(s) Oral daily  melatonin 3 milliGRAM(s) Oral at bedtime  Nephro-pito 1 Tablet(s) Oral daily  NIFEdipine XL 60 milliGRAM(s) Oral daily  NIFEdipine XL 90 milliGRAM(s) Oral at bedtime  pantoprazole    Tablet 40 milliGRAM(s) Oral every 12 hours  piperacillin/tazobactam IVPB.. 3.375 Gram(s) IV Intermittent every 12 hours  polyethylene glycol 3350 17 Gram(s) Oral every 12 hours  QUEtiapine 25 milliGRAM(s) Oral at bedtime  sevelamer carbonate 800 milliGRAM(s) Oral three times a day with meals  traZODone 100 milliGRAM(s) Oral at bedtime    PRN Medications:  acetaminophen   IVPB .. 1000 milliGRAM(s) IV Intermittent every 6 hours PRN Temp greater or equal to 38.5C (101.3F)  dextrose Oral Gel 15 Gram(s) Oral once PRN Blood Glucose LESS THAN 70 milliGRAM(s)/deciliter  sodium chloride 0.9% lock flush 10 milliLiter(s) IV Push every 1 hour PRN Pre/post blood products, medications, blood draw, and to maintain line patency      Objective:   T(F): 98.5 ( @ 16:11), Max: 101.4 ( @ 19:30)  HR: 74 ( @ 16:11) (66 - 88)  BP: 138/54 ( @ 16:11) (117/49 - 152/77)  RR: 18 ( @ 16:11) (18 - 20)  SpO2: 90% ( @ 16:11) (90% - 100%)      Physical Exam:   GEN: patient resting comfortably in bed, in no acute distress  RESP: respirations are unlabored, no accessory muscle use, no conversational dyspnea  CVS: RRR  GI: Abdomen soft, non-tender, non-distended, no rebound tenderness / guarding  Extremities : Abdomen soft, non-tender, non-distended, no rebound tenderness / guarding  lt lower extremity s/p lt AKA dressing in place, dressing is C/D/I     I&O's     @ 07:01  -   @ 07:00  --------------------------------------------------------  IN:  Total IN: 0 mL    OUT:    Incontinent per Condom Catheter (mL): 50 mL    Other (mL): 1500 mL  Total OUT: 1550 mL    Total NET: -1550 mL          LABS:                        9.4    7.42  )-----------( 297      ( 2022 02:22 )             30.7     -    139  |  98  |  26.4<H>  ----------------------------<  87  4.3   |  28.0  |  2.71<H>    Ca    8.9      2022 02:22  Phos  3.1     -  Mg     2.0     -14        Urinalysis Basic - ( 2022 07:24 )    Color: Yellow / Appearance: Clear / S.010 / pH: x  Gluc: x / Ketone: Negative  / Bili: Negative / Urobili: Negative mg/dL   Blood: x / Protein: 100 / Nitrite: Negative   Leuk Esterase: Negative / RBC: 3-5 /HPF / WBC 3-5 /HPF   Sq Epi: x / Non Sq Epi: Occasional / Bacteria: Occasional

## 2022-06-14 NOTE — PROGRESS NOTE ADULT - SUBJECTIVE AND OBJECTIVE BOX
Ethics consult initiated. Case discussed in detail with LAMIN Reid MD (Vascular Surgery Resident). Palliative Care on consult.   Case discussed in detail with Dr. Michelle (Palliative Care Attending). Complex case.   Discussion with Vanessa Douglas (daughter) for approximately 45 minutes: Vanessa recounted how her mother found her father on the floor bleeding out and she thought she lost her father. She stated prior to that day, he lived at home with his wife (her mother). She supported his decision the past 5 years to delay HD, but prepared him the day would come. She stated that he will only agree to Mon/Fri HD, not 3 days. She supported his decision to try to save his leg, but prepared him that he may lose it. And even now, supporting him through the necessary amputation. We discussed it is hard for a clinical provider to switch roles and be a daughter. She stated that she had a hard time with the [facility], but her mother was able to help them through that decision as they do not want him to suffer. Discussed her concern the evening before because he spiked a fever. Discussed micro aspiration as potential source and the SLP recommendations. She stated that her father didn’t like the puree, his bottom dentures were lost and his top are too loose. His personal dentist came today to make molds for new dentures. She will revisit the diet recommendation as this fever may be from micro aspiration. She  is hopeful that he will be able to go home soon. Discussed the difficulty in communication with the team and the possibility of the assistance of the Hospitalist Service for medical management. She was going to call Dr. Albrecht. Offered much support.   Discussed case again with Dr. Michelle.   Discussed with Vascular Surgery PA.   Follow up conversation with Vanessa (daughter) after discussion with Dr. Michelle and Vascular team.   Ethics consulted to facilitate communication between the medical team and the family regarding treatment plan.

## 2022-06-15 NOTE — PROGRESS NOTE ADULT - SUBJECTIVE AND OBJECTIVE BOX
Vascular Surgery Progress Note:  Patient was changed of room last night by the overnight team because at night he sundowns and he yells, he continues  in 1 to 1 watching- constant observation  for redirection   During the morning round , his pain seems to be controlled   He is  Tolerating diet. Denies n/v/f/c/cp/sob.   he has not had any more fevers - ( @ 20:28)  Currently on zosyn     Diet, Regular:   Supplement Feeding Modality:  Oral  Nepro Cans or Servings Per Day:  1       Frequency:  Three Times a day (22 @ 10:56)      Scheduled Medications:   acetaminophen     Tablet .. 650 milliGRAM(s) Oral every 6 hours  aspirin  chewable 81 milliGRAM(s) Oral daily  atorvastatin 80 milliGRAM(s) Oral at bedtime  chlorhexidine 2% Cloths 1 Application(s) Topical <User Schedule>  dextrose 5%. 1000 milliLiter(s) (100 mL/Hr) IV Continuous <Continuous>  dextrose 5%. 1000 milliLiter(s) (50 mL/Hr) IV Continuous <Continuous>  dextrose 50% Injectable 25 Gram(s) IV Push once  dextrose 50% Injectable 12.5 Gram(s) IV Push once  dextrose 50% Injectable 25 Gram(s) IV Push once  epoetin juan-epbx (RETACRIT) Injectable 35414 Unit(s) IV Push <User Schedule>  glucagon  Injectable 1 milliGRAM(s) IntraMuscular once  heparin   Injectable 5000 Unit(s) SubCutaneous every 8 hours  influenza  Vaccine (HIGH DOSE) 0.7 milliLiter(s) IntraMuscular once  isosorbide   mononitrate ER Tablet (IMDUR) 30 milliGRAM(s) Oral daily  melatonin 3 milliGRAM(s) Oral at bedtime  Nephro-pito 1 Tablet(s) Oral daily  NIFEdipine XL 60 milliGRAM(s) Oral daily  NIFEdipine XL 90 milliGRAM(s) Oral at bedtime  pantoprazole    Tablet 40 milliGRAM(s) Oral every 12 hours  piperacillin/tazobactam IVPB.. 3.375 Gram(s) IV Intermittent every 12 hours  polyethylene glycol 3350 17 Gram(s) Oral every 12 hours  QUEtiapine 25 milliGRAM(s) Oral at bedtime  sevelamer carbonate 800 milliGRAM(s) Oral three times a day with meals  traZODone 100 milliGRAM(s) Oral at bedtime    PRN Medications:  acetaminophen   IVPB .. 1000 milliGRAM(s) IV Intermittent every 6 hours PRN Temp greater or equal to 38.5C (101.3F)  dextrose Oral Gel 15 Gram(s) Oral once PRN Blood Glucose LESS THAN 70 milliGRAM(s)/deciliter  sodium chloride 0.9% lock flush 10 milliLiter(s) IV Push every 1 hour PRN Pre/post blood products, medications, blood draw, and to maintain line patency      Objective:   T(F): 98.1 (06-15 @ 13:00), Max: 100.2 ( @ 20:28)  HR: 56 (06-15 @ 13:00) (56 - 71)  BP: 107/53 (06-15 @ 13:00) (107/53 - 157/53)  RR: 18 (06-15 @ 13:00) (18 - 18)  SpO2: 97% (06-15 @ 13:00) (94% - 98%)      Physical Exam:   GEN: patient resting comfortably in bed, in no acute distress  RESP: respirations are unlabored, no accessory muscle use, no conversational dyspnea  CVS: RRR  GI: Abdomen soft, non-tender, non-distended, no rebound tenderness / guarding  Extremities : Abdomen soft, non-tender, non-distended, no rebound tenderness / guarding  lt lower extremity s/p lt AKA dressing in place, dressing is C/D/I ([facility]    I&O's      LABS:                        9.3    7.20  )-----------( 294      ( 15 Rolly 2022 05:35 )             30.4     06-15    140  |  100  |  42.4<H>  ----------------------------<  91  4.7   |  26.0  |  3.76<H>    Ca    9.0      15 Rolly 2022 05:35  Phos  4.0     06-15  Mg     2.0     06-15        Urinalysis Basic - ( 2022 07:24 )    Color: Yellow / Appearance: Clear / S.010 / pH: x  Gluc: x / Ketone: Negative  / Bili: Negative / Urobili: Negative mg/dL   Blood: x / Protein: 100 / Nitrite: Negative   Leuk Esterase: Negative / RBC: 3-5 /HPF / WBC 3-5 /HPF   Sq Epi: x / Non Sq Epi: Occasional / Bacteria: Occasional        MICROBIOLOGY:     Culture - Blood (collected  @ 05:44)  Source: .Blood Blood  Preliminary Report (06-15 @ 06:01):    No growth to date.    Culture - Blood (collected  @ 05:44)  Source: .Blood Blood  Preliminary Report (06-15 @ 06:01):    No growth to date.

## 2022-06-15 NOTE — PROGRESS NOTE ADULT - ASSESSMENT
· Assessment	  88yo M POD 11 Left AKA. Patient tolerated procedure well. Patient has  tolerated dialysis via PermCath   No growth to date from his  blood culture   Chest x ray: no evidence of pneumonia   Urinalysis  no evidence of urinary tract infection   Wound AKA , no evidence of erythema, no purulence or necrotic tissue,  stapled line looks healthy   There is not elevation of white blood cells but there is shift of neutrophils   Patient has not had more documented  fevers since yesterday   vital signs are within normal limits   I received a call from Nneka -  regarding Mr King daughter, she has a  time for her father at 15:30 tomorrow to take her home,  Vascular PA Basilio Moreno called Mrs Mendez ( Mr King's daughter) and had a long conversation with her, and explained the wound care, since Basilio has been main point of communications with her during this hospitalization.   (stump - to keep it isolated  from urine and feces with a simple Island dressing covering the staple line).  Patient can be discharged tomorrow once he completes 24 hours without spiking fevers  Ethics recommended hospitalist consult (Internal medicine) in order to facility communication with Mr Fontenot daughter. Hospitalist will be called  if Mr. King  is not discharge tomorrow.    Follow up with Dr. Barbara Sorenson as outpatient  for follow up of the AKA, and  AVF    We continue to monitor closely Mr King clinically of any fever,  or any further work up is warranted      Plan:  -Monitor vitals signs   -continue constant observation since the patient  gets confused at night  and sundowns   - cardiology was called as per daughter request  - no new recommendations since H and H stable and BP stable   - Diet recommended per Speech and Swallow  specialist--  puree and no liquids to avoid  aspiration but daughter requested  regular -  -Dressing with island dressing  done daily   - Palliative  also onboard for his care  -PMR also following him   - Daily change of dressing   Nephology rec hold of the AVF revision for now     Patient was seen and examined with the Senior resident Dr. Viveros and discussed with the Vascular Team of attendings. · Assessment	  86yo M POD 11 Left AKA. Patient tolerated procedure well. Patient has  tolerated dialysis via PermCath   He spiked fever on 6/14/22  Fever work up   No growth to date from his  blood culture   Chest x ray: no evidence of pneumonia   Urinalysis  no evidence of urinary tract infection   Wound AKA , no evidence of erythema, no purulence or necrotic tissue,  stapled line looks healthy   There is not elevation of white blood cells but there is shift of neutrophils   Patient has not had more documented  fevers since yesterday   vital signs are within normal limits   I received a call from Nneka -  regarding Mr King daughter, she has a  time for her father at 15:30 tomorrow to take him home.  He needs Home PT   Vascular PA Basilio Moreno called Mrs Mendez (Mr King's daughter) and had a long conversation with her, and explained the wound care, (since Basilio has been main point of communications with her during this hospitalization)   Wound care of the stump - to keep it isolated  from urine and feces with a simple Island dressing covering the staple line  Patient can be discharged tomorrow once he completes 24 hours without spiking fevers  Ethics recommended hospitalist consult (Internal medicine) in order to facility communication with Mr Fontenot daughter. Hospitalist will be called  if Mr. King  is not discharge tomorrow.    Follow up with Dr. Barbara Sorenson as outpatient  for follow up of the AKA, and  AVF    We continue to monitor closely Mr King clinically of any fever,  or any further work up is warranted      Plan:  -Monitor vitals signs   -continue constant observation since the patient  gets confused at night  and sundowns   - cardiology was called as per daughter request  - no new recommendations since H and H stable and BP stable   - Diet recommended per Speech and Swallow  specialist--  puree and no liquids to avoid  aspiration but daughter requested  regular -  -Dressing with island dressing  done daily   - Palliative  also onboard for his care  -PMR also following him   - Daily change of dressing   Nephology rec hold of the AVF revision for now     Patient was seen and examined with the Senior resident Dr. Viveros and discussed with the Vascular Team of attendings.

## 2022-06-15 NOTE — ED PROVIDER NOTE - CONSTITUTIONAL MENTATION
oriented to person, place, time/situation/awake/alert Valtrex Pregnancy And Lactation Text: this medication is Pregnancy Category B and is considered safe during pregnancy. This medication is not directly found in breast milk but it's metabolite acyclovir is present.

## 2022-06-15 NOTE — PROGRESS NOTE ADULT - SUBJECTIVE AND OBJECTIVE BOX
Patient was seen and evaluated on dialysis.   Patient is tolerating the procedure well.   T(C): 37.3 (06-15-22 @ 08:18), Max: 37.9 (06-14-22 @ 20:28)  HR: 67 (06-15-22 @ 10:45) (63 - 74)  BP: 114/53 (06-15-22 @ 10:45) (114/53 - 157/53)  Continue dialysis:  TIW  Dialyzer:  Revaclear 300        QB:  400 ml.,       QD: 500ml.,    Goal UF _1.5 L _ over _3.5_ Hours ,    Pt is seen and examined on dialysis. No symptoms. Hemodynamics stable. Tolerating dialysis and ultrafiltration.  Pre Laboratory values personally reviewed by me.  Dialysis adjusted appropriately based on current values.  Will continue the current medical management.  Next hemodialysis as scheduled.  Discussed with nursing, primary care team.

## 2022-06-15 NOTE — PROGRESS NOTE ADULT - ASSESSMENT
88M with  ESRD on HD, anemia, CHF with preserved EF, HTN, CAD, AFIB, PVD s/p recent bypass, LLE DVT, recently admitted with gastrointestinal bleeding, here with hemorrhagic shock related to graft bleeding s/p ligation of bleeding CryoVein fem-AT bypass, with AV graft thrombosis.     #1 S/P L AKA   - PAD, failed femoral tibial bypass  - graft thrombosis  - L stump with ace bandage D/I     #2 LLE pain  - acute on chronic ischemia  - S/P L AKA   - pain controlled currently     #3 Fever   - likely silent aspiration   - consider downgrading diet - per last swallow evaluation on 6/5 puree with no liquids, maybe needs reconsult to determine proper diet    - on zosyn      #4 ESRD  - Tunneled cathter placed during surgical experience after AKA  - HD per nephrology     #5 Sundowning  - continue seroquel     #6 Encounter for palliative care   - patient doing better today, afebrile    - he is anxious to get home   - alot of support being provided to daughter Vanessa

## 2022-06-15 NOTE — PROGRESS NOTE ADULT - SUBJECTIVE AND OBJECTIVE BOX
OVERNIGHT EVENTS: patient comfortable and asking when am I going home     Present Symptoms:     Dyspnea: none   Nausea/Vomiting: no   Anxiety:  No  Depression: No  Fatigue: no  Loss of appetite: No  Constipation: none     Pain: none             Character-            Duration-            Effect-            Factors-            Frequency-            Location-            Severity-    Pain AD Score:  [email or web address] (press ctrl + left click to view)    Review of Systems: Reviewed                     Negative: no chest pain                  All others negative    MEDICATIONS  (STANDING):  acetaminophen     Tablet .. 650 milliGRAM(s) Oral every 6 hours  aspirin  chewable 81 milliGRAM(s) Oral daily  atorvastatin 80 milliGRAM(s) Oral at bedtime  chlorhexidine 2% Cloths 1 Application(s) Topical <User Schedule>  dextrose 5%. 1000 milliLiter(s) (100 mL/Hr) IV Continuous <Continuous>  dextrose 5%. 1000 milliLiter(s) (50 mL/Hr) IV Continuous <Continuous>  dextrose 50% Injectable 25 Gram(s) IV Push once  dextrose 50% Injectable 12.5 Gram(s) IV Push once  dextrose 50% Injectable 25 Gram(s) IV Push once  epoetin juan-epbx (RETACRIT) Injectable 01501 Unit(s) IV Push <User Schedule>  glucagon  Injectable 1 milliGRAM(s) IntraMuscular once  heparin   Injectable 5000 Unit(s) SubCutaneous every 8 hours  influenza  Vaccine (HIGH DOSE) 0.7 milliLiter(s) IntraMuscular once  isosorbide   mononitrate ER Tablet (IMDUR) 30 milliGRAM(s) Oral daily  melatonin 3 milliGRAM(s) Oral at bedtime  Nephro-pito 1 Tablet(s) Oral daily  NIFEdipine XL 60 milliGRAM(s) Oral daily  NIFEdipine XL 90 milliGRAM(s) Oral at bedtime  pantoprazole    Tablet 40 milliGRAM(s) Oral every 12 hours  piperacillin/tazobactam IVPB.. 3.375 Gram(s) IV Intermittent every 12 hours  polyethylene glycol 3350 17 Gram(s) Oral every 12 hours  QUEtiapine 25 milliGRAM(s) Oral at bedtime  sevelamer carbonate 800 milliGRAM(s) Oral three times a day with meals  traZODone 100 milliGRAM(s) Oral at bedtime    MEDICATIONS  (PRN):  acetaminophen   IVPB .. 1000 milliGRAM(s) IV Intermittent every 6 hours PRN Temp greater or equal to 38.5C (101.3F)  dextrose Oral Gel 15 Gram(s) Oral once PRN Blood Glucose LESS THAN 70 milliGRAM(s)/deciliter  sodium chloride 0.9% lock flush 10 milliLiter(s) IV Push every 1 hour PRN Pre/post blood products, medications, blood draw, and to maintain line patency    PHYSICAL EXAM:    Vital Signs Last 24 Hrs  T(C): 37.3 (15 Rolly 2022 08:18), Max: 37.9 (2022 20:28)  T(F): 99.1 (15 Rolly 2022 08:18), Max: 100.2 (2022 20:28)  HR: 71 (15 Rolly 2022 04:11) (63 - 78)  BP: 156/64 (15 Rolly 2022 04:11) (129/50 - 157/53)  BP(mean): --  RR: 18 (15 Rolly 2022 04:11) (18 - 18)  SpO2: 96% (15 Rolly 2022 04:11) (90% - 98%)    General: alert     Karnofsky: 20 %    HEENT: normal      Lungs: comfortable    CV: normal      GI: normal     : anuria     MSK: weakness, s/p AKA     Skin: no rash    LABS:                      9.3    7.20  )-----------( 294      ( 15 Rolly 2022 05:35 )             30.4     06-15    140  |  100  |  42.4<H>  ----------------------------<  91  4.7   |  26.0  |  3.76<H>    Ca    9.0      15 Rolly 2022 05:35  Phos  4.0     06-15  Mg     2.0     06-15    Urinalysis Basic - ( 2022 07:24 )    Color: Yellow / Appearance: Clear / S.010 / pH: x  Gluc: x / Ketone: Negative  / Bili: Negative / Urobili: Negative mg/dL   Blood: x / Protein: 100 / Nitrite: Negative   Leuk Esterase: Negative / RBC: 3-5 /HPF / WBC 3-5 /HPF   Sq Epi: x / Non Sq Epi: Occasional / Bacteria: Occasional    I&O's Summary    RADIOLOGY & ADDITIONAL STUDIES:    ADVANCE DIRECTIVES/TREATMENT PREFERENCES:  DNR/DNI

## 2022-06-15 NOTE — PROGRESS NOTE ADULT - ASSESSMENT
88yo M POD #10 Left AKA. Patient tolerated procedure well. + CVC,     T 99.1 F this AM,   Chest x ray: no evidence of pneumonia   Urinalysis  no evidence of urinary tract infection   wound AKA , no evidence of erythema, no purulence or necrotic tissue,  stapled line looks healthy   There is not elevation of white blood cells but there is shift of neutrophils     Hold AVF revision for now ,

## 2022-06-16 NOTE — PROGRESS NOTE ADULT - ASSESSMENT
86yo M POD s/p Left AKA. Tolerating dialysis via PermCath   -Continue HD per Renal  -Regular diet + Nephro  -Continue IV Zosyn  -Continue ASA81 daily  -BP control w/ nifedipine 60/90mg  -PRN pain control, Minimize narcotics   -Continue 1:1 for nighttime delirium, Melatonin qHS, Trazadone, Seroquel  -Will plan for outpatient AVF revision  -DVT PPX: HSQ  Dispo: planning for d/c to [facility] Today     88yo M POD s/p Left AKA. Tolerating dialysis via PermCath , no elevation of white count   prelim of blood cultures is negative   -Continue HD per Renal  -Regular diet + Nephro  -Continue ASA81 daily  -BP control w/ nifedipine 60/90mg  -PRN pain control, Minimize narcotics   -Continue 1:1 for nighttime delirium, Melatonin qHS, Trazadone, Seroquel  -Will plan for outpatient AVF revision  Dispo: planning for d/c to his home  today as per discussion with Dr. Abeba Sorenson this morning   [place] BID for 7 days and follow up at Doctor Abeba Sorenson's office in 1 week time

## 2022-06-16 NOTE — PROGRESS NOTE ADULT - SUBJECTIVE AND OBJECTIVE BOX
INTERVAL HPI/OVERNIGHT EVENTS: Febrile to 100.4. Refused tylenol.     SUBJECTIVE: Denies pain this AM. Afebrile. Tolerating diet. OOB with assistance. Denies F/C/N/V/CP/SOB.       MEDICATIONS  (STANDING):  acetaminophen     Tablet .. 650 milliGRAM(s) Oral every 6 hours  aspirin  chewable 81 milliGRAM(s) Oral daily  atorvastatin 80 milliGRAM(s) Oral at bedtime  chlorhexidine 2% Cloths 1 Application(s) Topical <User Schedule>  dextrose 5%. 1000 milliLiter(s) (50 mL/Hr) IV Continuous <Continuous>  dextrose 5%. 1000 milliLiter(s) (100 mL/Hr) IV Continuous <Continuous>  dextrose 50% Injectable 25 Gram(s) IV Push once  dextrose 50% Injectable 12.5 Gram(s) IV Push once  dextrose 50% Injectable 25 Gram(s) IV Push once  epoetin juan-epbx (RETACRIT) Injectable 85490 Unit(s) IV Push <User Schedule>  glucagon  Injectable 1 milliGRAM(s) IntraMuscular once  heparin   Injectable 5000 Unit(s) SubCutaneous every 8 hours  influenza  Vaccine (HIGH DOSE) 0.7 milliLiter(s) IntraMuscular once  isosorbide   mononitrate ER Tablet (IMDUR) 30 milliGRAM(s) Oral daily  melatonin 3 milliGRAM(s) Oral at bedtime  Nephro-pito 1 Tablet(s) Oral daily  NIFEdipine XL 60 milliGRAM(s) Oral daily  NIFEdipine XL 90 milliGRAM(s) Oral at bedtime  pantoprazole    Tablet 40 milliGRAM(s) Oral every 12 hours  piperacillin/tazobactam IVPB.. 3.375 Gram(s) IV Intermittent every 12 hours  polyethylene glycol 3350 17 Gram(s) Oral every 12 hours  QUEtiapine 25 milliGRAM(s) Oral at bedtime  sevelamer carbonate 800 milliGRAM(s) Oral three times a day with meals  traZODone 100 milliGRAM(s) Oral at bedtime    MEDICATIONS  (PRN):  acetaminophen   IVPB .. 1000 milliGRAM(s) IV Intermittent every 6 hours PRN Temp greater or equal to 38.5C (101.3F)  dextrose Oral Gel 15 Gram(s) Oral once PRN Blood Glucose LESS THAN 70 milliGRAM(s)/deciliter  sodium chloride 0.9% lock flush 10 milliLiter(s) IV Push every 1 hour PRN Pre/post blood products, medications, blood draw, and to maintain line patency      Vital Signs Last 24 Hrs  T(C): 37.6 (16 Jun 2022 08:48), Max: 38 (15 Rolly 2022 18:00)  T(F): 99.6 (16 Jun 2022 08:48), Max: 100.4 (15 Rolly 2022 18:00)  HR: 69 (16 Jun 2022 04:32) (56 - 70)  BP: 151/67 (16 Jun 2022 04:32) (107/53 - 151/67)  BP(mean): --  RR: 18 (16 Jun 2022 04:32) (18 - 18)  SpO2: 95% (16 Jun 2022 04:32) (95% - 98%)    Physical Exam:   GEN: patient resting comfortably in bed, in no acute distress  RESP: respirations are unlabored, no accessory muscle use, no conversational dyspnea  CVS: RRR  GI: Abdomen soft, non-tender, non-distended, no rebound tenderness / guarding  Extremities : Abdomen soft, non-tender, non-distended, no rebound tenderness / guarding  lt lower extremity s/p lt AKA dressing in place, dressing is C/D/I ([place] dressing        I&O's Detail    15 Rolly 2022 07:01  -  16 Jun 2022 07:00  --------------------------------------------------------  IN:  Total IN: 0 mL    OUT:    Other (mL): 1500 mL    Voided (mL): 200 mL  Total OUT: 1700 mL    Total NET: -1700 mL          LABS:                        9.3    7.20  )-----------( 294      ( 15 Rolly 2022 05:35 )             30.4     06-15    140  |  100  |  42.4<H>  ----------------------------<  91  4.7   |  26.0  |  3.76<H>    Ca    9.0      15 Rolly 2022 05:35  Phos  4.0     06-15  Mg     2.0     06-15           INTERVAL HPI/OVERNIGHT EVENTS:  patient had a rectal temp of 100.4 - he refused tylenol     SUBJECTIVE: Denies pain this AM. Afebrile. Tolerating diet. OOB with assistance. Denies F/C/N/V/CP/SOB.       MEDICATIONS  (STANDING):  acetaminophen     Tablet .. 650 milliGRAM(s) Oral every 6 hours  aspirin  chewable 81 milliGRAM(s) Oral daily  atorvastatin 80 milliGRAM(s) Oral at bedtime  chlorhexidine 2% Cloths 1 Application(s) Topical <User Schedule>  dextrose 5%. 1000 milliLiter(s) (50 mL/Hr) IV Continuous <Continuous>  dextrose 5%. 1000 milliLiter(s) (100 mL/Hr) IV Continuous <Continuous>  dextrose 50% Injectable 25 Gram(s) IV Push once  dextrose 50% Injectable 12.5 Gram(s) IV Push once  dextrose 50% Injectable 25 Gram(s) IV Push once  epoetin juan-epbx (RETACRIT) Injectable 84965 Unit(s) IV Push <User Schedule>  glucagon  Injectable 1 milliGRAM(s) IntraMuscular once  heparin   Injectable 5000 Unit(s) SubCutaneous every 8 hours  influenza  Vaccine (HIGH DOSE) 0.7 milliLiter(s) IntraMuscular once  isosorbide   mononitrate ER Tablet (IMDUR) 30 milliGRAM(s) Oral daily  melatonin 3 milliGRAM(s) Oral at bedtime  Nephro-pito 1 Tablet(s) Oral daily  NIFEdipine XL 60 milliGRAM(s) Oral daily  NIFEdipine XL 90 milliGRAM(s) Oral at bedtime  pantoprazole    Tablet 40 milliGRAM(s) Oral every 12 hours  piperacillin/tazobactam IVPB.. 3.375 Gram(s) IV Intermittent every 12 hours  polyethylene glycol 3350 17 Gram(s) Oral every 12 hours  QUEtiapine 25 milliGRAM(s) Oral at bedtime  sevelamer carbonate 800 milliGRAM(s) Oral three times a day with meals  traZODone 100 milliGRAM(s) Oral at bedtime    MEDICATIONS  (PRN):  acetaminophen   IVPB .. 1000 milliGRAM(s) IV Intermittent every 6 hours PRN Temp greater or equal to 38.5C (101.3F)  dextrose Oral Gel 15 Gram(s) Oral once PRN Blood Glucose LESS THAN 70 milliGRAM(s)/deciliter  sodium chloride 0.9% lock flush 10 milliLiter(s) IV Push every 1 hour PRN Pre/post blood products, medications, blood draw, and to maintain line patency      Vital Signs Last 24 Hrs  T(C): 37.6 (16 Jun 2022 08:48), Max: 38 (15 Rolly 2022 18:00)  T(F): 99.6 (16 Jun 2022 08:48), Max: 100.4 (15 Rolly 2022 18:00)  HR: 69 (16 Jun 2022 04:32) (56 - 70)  BP: 151/67 (16 Jun 2022 04:32) (107/53 - 151/67)  BP(mean): --  RR: 18 (16 Jun 2022 04:32) (18 - 18)  SpO2: 95% (16 Jun 2022 04:32) (95% - 98%)    Physical Exam:   GEN: patient resting comfortably in bed, in no acute distress  RESP: respirations are unlabored, no accessory muscle use, no conversational dyspnea  CVS: RRR  GI: Abdomen soft, non-tender, non-distended, no rebound tenderness / guarding  Extremities : Abdomen soft, non-tender, non-distended, no rebound tenderness / guarding  lt lower extremity s/p lt AKA dressing in place, dressing is C/D/I ([place] dressing        I&O's Detail    15 Rolly 2022 07:01  -  16 Jun 2022 07:00  --------------------------------------------------------  IN:  Total IN: 0 mL    OUT:    Other (mL): 1500 mL    Voided (mL): 200 mL  Total OUT: 1700 mL    Total NET: -1700 mL          LABS:                        9.3    7.20  )-----------( 294      ( 15 Rolly 2022 05:35 )             30.4     06-15    140  |  100  |  42.4<H>  ----------------------------<  91  4.7   |  26.0  |  3.76<H>    Ca    9.0      15 Rolly 2022 05:35  Phos  4.0     06-15  Mg     2.0     06-15

## 2022-06-16 NOTE — PROGRESS NOTE ADULT - PROVIDER SPECIALTY LIST ADULT
Nephrology
Vascular Surgery
Vascular Surgery
Cardiology
Nephrology
Palliative Care
SICU
Vascular Surgery
Vascular Surgery
Nephrology
Nephrology
Palliative Care
Palliative Care
SICU
SICU
Vascular Surgery
Ethics
Nephrology
Palliative Care
Vascular Surgery
Palliative Care

## 2022-06-17 PROBLEM — R41.0 DELIRIUM: Status: ACTIVE | Noted: 2022-01-01

## 2022-06-18 NOTE — REVIEW OF SYSTEMS
[Vision Problems] : vision problems [Muscle Weakness] : muscle weakness [Negative] : Psychiatric [Lower Ext Edema] : lower extremity edema [Pain] : no pain [Redness] : no redness [Dryness] : no dryness [Itching] : no itching [FreeTextEntry3] : wears glasses  [FreeTextEntry9] : non-ambulatory; generalized weakness; right leg wound to calf  [de-identified] : eschar

## 2022-06-18 NOTE — H&P ADULT - ASSESSMENT
ASSESSMENT:  88M with PMHX ESRD/HD ([facility]), Hx GIB 2/2 AVM, CAD, HTN, HLD, PPM, CHFpEF, PAD s/p L Fem Bypass with cryovein c/b acute graft rethrombosis s/p thrombectomy now s/p L AKA discharged 2 days ago from Vascular Service after SICU admission for Hemorrhagic Shock s/p L AKA on Augmentin for "FUO" discharged two days admitted for Recurrent Fevers and AMS r/o Pleural Effusion/Parapneumonic Effusions.    PLAN:  Fever r/o PNA c/b Parapneumonic Effusions   -Admit to Tele/  -Titrate O2 via NC currently 4LPM (previosuly not on O2 prior last hospitalization)  -CXR +blunted costophrenic angles  -CT Chest pending but +moderate effusion on my review  -Discontinue Augmentin  -Broaden ABX to Zosyn 3.375g IV q12  -Vancomycin 1g IV x1  -COVID/RVP Negative  -Repeat BCX pending  -Check MRSA PCR/Legionella/Strep PNA  -NPO in case of pigtail/CT placement  -CT Surgery Consulted for Pleural Effusion  -Vascular Sx Consulted for AKA  -ID Consulted for Fever r/o PNA/Effusions    PAD s/p L Fem Bypass with cryovein c/b acute graft rethrombosis s/p thrombectomy now s/p L AKA  -Fentanyl Patch  -Vascular Surgery Consulted    Hx LLE DVT, Hx AFIB  -Hemorrhagic Shock last admission on Eliquis  -AC Held during last hospitalization    ESRD/HD   -Torsemide 20mg PO q24  -VTE PPX SQH  -Nephrovite Daily  -Renal Diet + Sevelamer TID ACHS  -Nephro Consulted ([facility])    Hypotension/Bradycardia   -/43 on admission.   -250cc IVFB NSS given in ED  -Hold Nifedipine 90mg qAM  -[place] Consult family want PPM interrogated while inpatient    HTN, HLD, CAD, CHFpEF  -Torsemide 20mg PO q24  -Hold Nifedipine 90mg PO q24  -ASA 81mg PO q24  -Atorvastatin 80mg PO q24  -Imdur 30mg ER q24    Delirium  -Melatonin 3mg PO q24  -Trazodone 50mg PO q24  -Seroquel 25mg PO qHS -> Discussed titrating dose up until patient able to sleep overnight if no sleep tonight would go to 50mg PO BID     Constipation  -Hold Miralax and Senna given antibiotic-induced loose stool reported    Goals of Care  -DNR/DNI Confirmed. See MOLST Prior. Family wants all other medical and surgical options apart from CPR/Mechanical Ventilation. Palliative Consult for continuity.

## 2022-06-18 NOTE — ADDENDUM
[FreeTextEntry1] : Pt is homebound and unable to leave house because of amputee status (post removal of R leg), CHF, end-stage renal disease.

## 2022-06-18 NOTE — CURRENT MEDS
[Medication and Allergies Reconciled] : medication and allergies reconciled [High Risk Medications Reviewed and Reconciled (Beers Criteria)] : high risk medications reviewed and reconciled [Adherent to medications] : Patient is adherent to medications as prescribed [de-identified] : medications managed by patient's wife

## 2022-06-18 NOTE — PATIENT PROFILE ADULT - FALL HARM RISK - HARM RISK INTERVENTIONS
Assistance OOB with selected safe patient handling equipment/Communicate Risk of Fall with Harm to all staff/Monitor for mental status changes/Move patient closer to nurses' station/Reinforce activity limits and safety measures with patient and family/Reorient to person, place and time as needed/Tailored Fall Risk Interventions/Toileting schedule using arm’s reach rule for commode and bathroom/Use of alarms - bed, chair and/or voice tab/Visual Cue: Yellow wristband and red socks/Bed in lowest position, wheels locked, appropriate side rails in place/Call bell, personal items and telephone in reach/Instruct patient to call for assistance before getting out of bed or chair/Non-slip footwear when patient is out of bed/[place] to call system/Physically safe environment - no spills, clutter or unnecessary equipment/Purposeful Proactive Rounding/Room/bathroom lighting operational, light cord in reach

## 2022-06-18 NOTE — REASON FOR VISIT
[Spouse] : spouse [Formal Caregiver] : formal caregiver [Pre-Visit Preparation] : pre-visit preparation was done [Intercurrent Specialty/Sub-specialty Visits] : the patient has intercurrent specialty/sub-specialty visits [Initial Annual Medicare Wellness Visit] : an initial annual Medicare wellness visit [FreeTextEntry2] : chart review

## 2022-06-18 NOTE — ED ADULT TRIAGE NOTE - ESI TRIAGE ACUITY LEVEL, MLM
Patient Name: Kelly Strickland  MRN: [id number]  : 1955  DOS: 3/14/2017    Attending: Percy Rhodes MD    Primary Care Provider: Mary Antonio DO    Date of Admission:.3/10/2017  8:16 AM    Date of Discharge:  3/14/2017    Discharge Diagnosis:  Principal Problem:    S/P ORIF right humeral shaft fracture   Active Problems:    Closed fracture of humerus    GERD (gastroesophageal reflux disease)    Osteopenia    Acute post-operative pain      Hospital Course  Patient is a 62 y.o. female presented to [facility] ER after a fall with complaints of right arm pain. Xray confirmed a right humerus fracture. She was admitted for pain management and orthopedic consult for surgical intervention. She denies pain other than arm. She denies syncope at time of fall. She apparently was going down some steps at her home, holding a grandchild. Missed a step when the dog went in front of her, fell landing on RUE.    She underwent ORIF of right humeral shaft fracture, and tolerated surgery well.    Patient was provided pain medications as needed for pain control. Post-operatively she received an interscalene nerve block infusion of Ropivacaine.      The patient was seen by OT and was taught exercises for her right arm.  The patient used an IS for atelectasis prophylaxis and mechanicals for DVT prophylaxis.  Home medications were resumed as appropriate, and labs were monitored and remained fairly stable.   With the progress she has made, she is ready for DC home today.      The patient will have an On Q pump ( instructed on it during this admit)  Discussed with patient regarding plan and she shows understanding and agreement.        Procedures Performed  DATE OF PROCEDURE:  2017     PREOPERATIVE DIAGNOSIS: Right humeral shaft fracture.      POSTOPERATIVE DIAGNOSIS: Right humeral shaft fracture.      PROCEDURE PERFORMED: Open reduction and internal fixation of right humeral shaft fracture.      SURGEON: Ryan Jimenes  "MD       Pertinent Test Results:    I reviewed the patient's new clinical results.     Results from last 7 days  Lab Units 17  0513 03/10/17  0955   WBC 10*3/mm3 6.77 12.19*   HEMOGLOBIN g/dL 12.3 14.0   HEMATOCRIT % 38.3 42.1   PLATELETS 10*3/mm3 261 276       Results from last 7 days  Lab Units 17  0513 03/10/17  0955   SODIUM mmol/L 137 141   POTASSIUM mmol/L 3.8 4.3   CHLORIDE mmol/L 104 110*   TOTAL CO2 mmol/L 27.0 29.0   BUN mg/dL 10 12   CREATININE mg/dL 0.60 0.70   CALCIUM mg/dL 8.9 9.0   GLUCOSE mg/dL 91 103*     I reviewed the patient's new imaging including images and reports.      Discharge Assessment:    Vital Signs  Visit Vitals   • /67 (BP Location: Left arm, Patient Position: Lying)   • Pulse 106   • Temp 97.6 °F (36.4 °C) (Temporal Artery )   • Resp 16   • Ht 65\" (165.1 cm)   • Wt 158 lb (71.7 kg)   • SpO2 94%   • BMI 26.29 kg/m2     Temp (24hrs), Av.4 °F (36.3 °C), Min:97.1 °F (36.2 °C), Max:97.6 °F (36.4 °C)      General Appearance:    Alert, cooperative, in no acute distress   Lungs:     Clear to auscultation,respirations regular, even and                   unlabored    Heart:    Regular rhythm and normal rate, normal S1 and S2   Abdomen:     Normal bowel sounds, no masses, no organomegaly, soft        non-tender, non-distended, no guarding, no rebound                 tenderness   Extremities:   RUE in sling. Aquacel dressing CDI. Good cap refill and movement right digits.   Pulses:   Pulses palpable and equal bilaterally   Skin:   No bleeding, bruising or rash   Neurologic:   Cranial nerves 2 - 12 grossly intact, sensation intact       Discharge Disposition: Home    Discharge Medications   Kelly Strickland   Home Medication Instructions ALPHONSE:[id number]    Printed on:[id number]   Medication Information                      docusate sodium 100 MG capsule  Take 100 mg by mouth 2 (Two) Times a Day.             ibandronate (BONIVA) 150 MG tablet  Take 1 tablet by mouth " every 30 (thirty) days.             ibuprofen (ADVIL,MOTRIN) 200 MG tablet  Take 200 mg by mouth Every 6 (Six) Hours As Needed for Mild Pain (1-3).             ondansetron (ZOFRAN) 4 MG tablet  Take 1 tablet by mouth Every 8 (Eight) Hours As Needed for Nausea or Vomiting.             oxyCODONE-acetaminophen (PERCOCET)  MG per tablet  Take 1 tablet by mouth Every 4 (Four) Hours As Needed (pain) for up to 10 days.             ropivacaine (NAROPIN) 0.2 %  8 mg/hr by Interscalene route Continuous.             sertraline (ZOLOFT) 50 MG tablet  Take 1 tablet by mouth Daily.                 Discharge Diet: regular diet    Activity at Discharge: RUE in sling    Follow-up Appointments  Dr. Jimenes per his orders    Discharge took over 30 min.    Percy Rhodes MD  03/14/17  3:47 PM   Seen and examined by me. Agree with above. Discussed with patient and her .     2

## 2022-06-18 NOTE — ED CLERICAL - NS ED CLERK NOTE PRE-ARRIVAL INFORMATION; ADDITIONAL PRE-ARRIVAL INFORMATION

## 2022-06-18 NOTE — ED PROVIDER NOTE - COVID-19  TEST TYPE
Department of Internal Medicine  Nephrology Consult Note      Reason for Consult:  Hyponatremia   Requesting Physician:  Dr. Fabricio Marcano:  Altered mental status    History Obtained From:  patient, electronic medical record    HISTORY OF PRESENT ILLNESS:  Briefly, Mr. Eloisa Traylor is an 80year old male with a PMH of CKD stage IIIa, without proteinuria, baseline creatinine 1.4-1.5 mg/dL, secondary to nephrosclerosis, HTN, BPH, PAF on chronic anticoagulation on apixaban, s/p pacemaker, who originally presented to [facility] on August 9, 2021 with altered mental status, frequent falls, and dizziness which has been ongoing for the past few weeks. He was found to be hyponatremic with a sodium level of 121. A CT of the head was obtained there which revealed a questionable basal ganglia CVA. He was subsequently transferred to [facility] on August 9th due to bed availability. A CT of the head was obtained once he was transferred which did not show a CVA. His sodium level had increased to 128 and he was started on D5W to avoid rapid correction, dropping his sodium to 124. D5W was stopped and his sodium level has been stable at 127 ever since. We are consulted for management of hyponatremia. He denies any nausea, vomiting, or diarrhea. He states he eats three meals a day and has been drinking about 5-6 glasses of water a day. He also admits to nocturia with having to get up to urinate about 5-6 times a night. Past Medical History:        Diagnosis Date    Atrial fibrillation ([address]     Hypertension     Pacemaker      Past Surgical History:    No past surgical history on file.   Current Medications:    Current Facility-Administered Medications: hydrALAZINE (APRESOLINE) injection 10 mg, 10 mg, Intravenous, Q2H PRN  LORazepam (ATIVAN) injection 0.5 mg, 0.5 mg, Intravenous, Once PRN  [Held by provider] dextrose 5 % solution, , Intravenous, Continuous  apixaban (ELIQUIS) tablet 5 mg, 5 mg, Oral, BID  atorvastatin (LIPITOR) tablet 10 mg, 10 mg, Oral, Daily  dilTIAZem (CARDIZEM CD) extended release capsule 120 mg, 120 mg, Oral, Daily  famotidine (PEPCID) tablet 20 mg, 20 mg, Oral, Daily  flecainide (TAMBOCOR) tablet 150 mg, 150 mg, Oral, BID  tamsulosin (FLOMAX) capsule 0.4 mg, 0.4 mg, Oral, Daily  labetalol (NORMODYNE;TRANDATE) injection 10 mg, 10 mg, Intravenous, Q4H PRN  sodium chloride flush 0.9 % injection 5-40 mL, 5-40 mL, Intravenous, 2 times per day  sodium chloride flush 0.9 % injection 5-40 mL, 5-40 mL, Intravenous, PRN  0.9 % sodium chloride infusion, 25 mL, Intravenous, PRN  ondansetron (ZOFRAN-ODT) disintegrating tablet 4 mg, 4 mg, Oral, Q8H PRN **OR** ondansetron (ZOFRAN) injection 4 mg, 4 mg, Intravenous, Q6H PRN  polyethylene glycol (GLYCOLAX) packet 17 g, 17 g, Oral, Daily PRN  acetaminophen (TYLENOL) tablet 650 mg, 650 mg, Oral, Q6H PRN **OR** acetaminophen (TYLENOL) suppository 650 mg, 650 mg, Rectal, Q6H PRN  Allergies:  Patient has no known allergies. Social History:    TOBACCO:   has no history on file for tobacco use. ETOH:   has no history on file for alcohol use. Family History:   No family history on file.      REVIEW OF SYSTEMS:    CONSTITUTIONAL:  negative for  fevers and chills  EYES:  negative for  double vision and blurred vision  HEENT:  negative for  epistaxis  RESPIRATORY:  negative for  dyspnea  CARDIOVASCULAR:  negative for  chest pain, dyspnea, edema  GASTROINTESTINAL:  negative for nausea, vomiting, diarrhea and abdominal pain  GENITOURINARY:  positive for frequency, nocturia  INTEGUMENT/BREAST:  positive for ecchymosis  HEMATOLOGIC/LYMPHATIC:  negative  ALLERGIC/IMMUNOLOGIC:  negative  ENDOCRINE:  negative  MUSCULOSKELETAL:  negative for  decreased range of motion and muscle weakness  NEUROLOGICAL:  negative for headaches and dizziness  BEHAVIOR/PSYCH:  negative for poor appetite    PHYSICAL EXAM:      Vitals:    VITALS:  BP (!) 172/88   Pulse 72   Temp 98 °F (36.7 °C) (Oral)   Resp 16   Ht 6' (1.829 m)   Wt 212 lb 4.8 oz (96.3 kg)   SpO2 96%   BMI 28.79 kg/m²   24HR INTAKE/OUTPUT:    Intake/Output Summary (Last 24 hours) at 8/11/2021 0742  Last data filed at 8/11/2021 0430  Gross per 24 hour   Intake 515 ml   Output 725 ml   Net -210 ml       Constitutional:  Alert and oriented, NAD  HEENT:  Normocephalic, PERRL  Respiratory:  CTA bilaterally  Cardiovascular/Edema:  RRR, S1,S2   Gastrointestinal:  Soft, rounded, nontender, nondistended  Neurologic:  Nonfocal, SILVA  Skin:  Warm, dry, ecchymosis BUE  Other:  No edema     DATA:    CBC:   Lab Results   Component Value Date    WBC 6.9 08/11/2021    RBC 3.88 08/11/2021    HGB 11.5 08/11/2021    HCT 33.6 08/11/2021    MCV 86.6 08/11/2021    MCH 29.6 08/11/2021    MCHC 34.2 08/11/2021    RDW 12.1 08/11/2021     08/11/2021    MPV 8.2 08/11/2021     CMP:    Lab Results   Component Value Date     08/11/2021     08/11/2021    K 4.4 08/11/2021    K 4.2 08/11/2021    K 4.3 08/10/2021    CL 93 08/11/2021    CL 93 08/11/2021    CO2 25 08/11/2021    CO2 24 08/11/2021    BUN 20 08/11/2021    BUN 21 08/11/2021    CREATININE 1.7 08/11/2021    CREATININE 1.7 08/11/2021    GFRAA 46 08/11/2021    GFRAA 46 08/11/2021    LABGLOM 38 08/11/2021    LABGLOM 38 08/11/2021    GLUCOSE 110 08/11/2021    GLUCOSE 109 08/11/2021    PROT 6.2 08/11/2021    LABALBU 3.9 08/11/2021    CALCIUM 8.9 08/11/2021    CALCIUM 8.9 08/11/2021    BILITOT 0.5 08/11/2021    ALKPHOS 69 08/11/2021    AST 17 08/11/2021    ALT 11 08/11/2021     Magnesium:  No results found for: MG  Phosphorus:    Lab Results   Component Value Date    PHOS 3.7 11/03/2015     Radiology Review:      CT head without IV contrast 8/11/21   Somewhat degraded study by the patient's motion.  The for, the possibility of   acute intracranial hemorrhage cannot be entirely excluded.       Chronic nonemergent findings as above.        IMPRESSION/RECOMMENDATIONS:      Briefly, . Trace Weber is an 80year old male with a PMH of CKD stage IIIa, without proteinuria, baseline creatinine 1.4-1.5 mg/dL, secondary to nephrosclerosis, HTN, BPH, PAF on chronic anticoagulation on apixaban, s/p pacemaker, who originally presented to [facility] on August 9, 2021 with altered mental status, frequent falls, and dizziness which has been ongoing for the past few weeks. He was found to be hyponatremic with a sodium level of 121. A CT of the head was obtained there which revealed a questionable basal ganglia CVA. He was subsequently transferred to [facility] on August 9th due to bed availability. A CT of the head was obtained once he was transferred which did not show a CVA. His sodium level had increased to 128 and he was started on D5W to avoid rapid correction, dropping his sodium to 124. D5W was stopped and his sodium level has been stable at 127 ever since. We are consulted for management of hyponatremia. He denies any nausea, vomiting, or diarrhea. He states he eats three meals a day and has been drinking about 5-6 glasses of water a day. He also admits to nocturia with having to get up to urinate about 5-6 times a night. 1. Hypotonic hyponatremia, likely secondary to lack of water excretion and increased free water intake. He may also have a component of obstructive uropathy. We will obtain urine osmolality and electrolytes. 2. CKD stage IIIa, without proteinuria, baseline creatinine 1.4-1.5 mg/dL, secondary to nephrosclerosis. Renal function had increased last night but has since improved and is stable. 3. HTN, on diltiazem. We will add doxazosin for better BP control.  ------------------------------------------------------   4. PAF, on diltiazem and apixaban  5.  BPH, on tamsulosin    Plan:    · Doxazosin 2 mg PO daily  · 1 L fluid restriction   · Strict I&Os  · Monitor sodium level, BMP every 12 hours  · Monitor renal function  · Monitor BP  · Obtain urine osmolality and electrolytes  · Obtain PVR  · Obtain UPCR and UACR    Thank you so much Dr. Maria Guadalupe Staton for allowing us to participate in the care of Mr. Arnoldo Wilson.     Electronically signed by BERNARDINO Loza CNP on 8/11/2021 at 11:12 AM MOLECULAR PCR

## 2022-06-18 NOTE — ED ADULT NURSE NOTE - CHIEF COMPLAINT QUOTE
As per family, patient has been having increasing weakness and lethargy. Was recently sent home on PO abx.

## 2022-06-18 NOTE — ED PROVIDER NOTE - PHYSICAL EXAMINATION
Alert, lucid, and in no apparent distress. Pt is normocephalic, atraumatic.  Pupils are equal, round, lips pink, moist mucous membranes, tongue midline. Neck supple.   Lungs  diminished rt lung field. Heart regular rate and rhythm, normal S1, S2,   Abdomen is soft, nontender, no pulsatile mass, no masses, no distension, no rebound.   Non-focal sensory, 5 out of 5 motor strength, no dysmetria, fluent, goal directed speech. CN2 to 12 intact. Skin without rash,    Normal mentation, does not appear agitated

## 2022-06-18 NOTE — PHYSICAL EXAM
[No Acute Distress] : no acute distress [Well Nourished] : well nourished [Normal Outer Ear/Nose] : the ears and nose were normal in appearance [Normal Oropharynx] : the oropharynx was normal [Normal TMs] : both tympanic membranes were normal [No JVD] : no jugular venous distention [Supple] : the neck was supple [No Respiratory Distress] : no respiratory distress [Clear to Auscultation] : lungs were clear to auscultation bilaterally [No Accessory Muscle Use] : no accessory muscle use [Normal Rate] : heart rate was normal  [Regular Rhythm] : with a regular rhythm [Normal Bowel Sounds] : normal bowel sounds [Non Tender] : non-tender [Soft] : abdomen soft [Normal Supraclavicular Nodes] : no supraclavicular lymphadenopathy [No CVA Tenderness] : no ~M costovertebral angle tenderness [No Spinal Tenderness] : no spinal tenderness [No Gross Sensory Deficits] : no gross sensory deficits [Oriented x3] : oriented to person, place, and time [Normal Affect] : the affect was normal [Kyphosis] : no kyphosis present [de-identified] : +2/3 lower extremity edmea  [de-identified] : wheelchair bound. non-ambulatory  [de-identified] : as noted in HPI & A&P [de-identified] : wheelchair bound - non-ambulatory

## 2022-06-18 NOTE — CHRONIC CARE ASSESSMENT
[Inadequate social support] : inadequate social support [Can not Exercise (Disability)] : Exercise: The patient can not exercise due to disability [None] : The patient does not exercise [Low Salt Diet] : low salt [PPS Score: ____] : Palliative Performance Scale (PPS) Score: [unfilled] [de-identified] : low salt - follow renal diet

## 2022-06-18 NOTE — ED ADULT NURSE NOTE - OBJECTIVE STATEMENT
pt biba from home for increasing weakness and lethargy. pt @ baseline mental status a&ox2 w/ periods of confusion. pt has significant pmhx CHF, HTN and ESRD pt receives dialysis Monday and Friday.  pt was recently d/c'd from hospital on PO abx. per pt's family pt has not slept in 3 days, has had intermittent fevers @ home and has had episodes of delirium. pt placed on cardiac monitor/. respirations even and unlabored on 3L O2. POC discussed w/ patient and patient's family @ bedside. will continue to monitor.

## 2022-06-18 NOTE — ED ADULT NURSE NOTE - NSIMPLEMENTINTERV_GEN_ALL_ED
Implemented All Fall with Harm Risk Interventions:  [place] to call system. Call bell, personal items and telephone within reach. Instruct patient to call for assistance. Room bathroom lighting operational. Non-slip footwear when patient is off stretcher. Physically safe environment: no spills, clutter or unnecessary equipment. Stretcher in lowest position, wheels locked, appropriate side rails in place. Provide visual cue, wrist band, yellow gown, etc. Monitor gait and stability. Monitor for mental status changes and reorient to person, place, and time. Review medications for side effects contributing to fall risk. Reinforce activity limits and safety measures with patient and family. Provide visual clues: red socks.

## 2022-06-18 NOTE — HEALTH RISK ASSESSMENT
[HRA Reviewed] : Health risk assessment reviewed [Some assistance needed] : using telephone [Full assistance needed] : managing finances [Patient not ambulatory (Wheelchair)] : Patient is not ambulatory (Wheelchair) [Yes] : The patient does have visual impairment [FreeTextEntry8] : n/a [de-identified] : non-ambulatory

## 2022-06-18 NOTE — ED CLERICAL - DIVISION
----- Message from Jodie Hurt sent at 3/19/2019 10:47 AM CDT -----  Contact: [phone]/self  Patient is requesting orders for lab work at [facility].  Type:  RX Refill Request    Who Called:self  Refill or New Rx:escitalopram oxalate (LEXAPRO) 10 MG tablet  RX Name and Strength:  How is the patient currently taking it? (ex. 1XDay): Take 1 tablet (10 mg total) by mouth once daily. - Oral  Is this a 30 day or 90 day RX:30 tablet  Preferred Pharmacy with phone number: [id number] - [place], [place] - [address] [place] [address]  Local or Mail Order:local  Ordering Provider:Dr. Adamson  Would the patient rather a call back or a response via [facility]?call        
I notified the pt and scheduled her labs for 4/3  
Lab work ordered medication sent in three month supply  
[facility]

## 2022-06-18 NOTE — H&P ADULT - NSHPPHYSICALEXAM_GEN_ALL_CORE
Vital Signs Last 24 Hrs  T(C): 38.3 (18 Jun 2022 22:28), Max: 38.3 (18 Jun 2022 22:28)  T(F): 101 (18 Jun 2022 22:28), Max: 101 (18 Jun 2022 22:28)  HR: 50 (18 Jun 2022 22:28) (50 - 88)  BP: 116/43 (18 Jun 2022 22:28) (110/40 - 123/59)  BP(mean): 68 (18 Jun 2022 22:28) (68 - 68)  RR: 18 (18 Jun 2022 22:28) (16 - 18)  SpO2: 95% (18 Jun 2022 22:28) (95% - 97%)    Constitutional: NAD, VSS  Head: NC/AT +NC in place  Eyes: PERRL, EOMI, anicteric sclera, conjunctiva WNL  ENT: Normal Pharynx, MMM, No tonsillar exudate/erythema  Neck: Supple, Non-tender  Chest: Non-tender, ACW Permacath  Cardio: RRR, s1/s2, no appreciable murmurs/rubs/gallops  Resp: BS CTA bilaterally, no wheezing/rhonchi/rales  Abd: Soft, Non-tender, Non-distended, no rebound/guarding/rigidity  : not examined  Rectal: not examined  MSK: +LLE AKA C/D/I Bandage in place +RLE WNL  Psych: +Confused  Neuro: Unable to assess  Skin: Warm/Dry.

## 2022-06-18 NOTE — COUNSELING
[Overweight - ( BMI 25.1 - 29.9 )] : overweight -  ( BMI 25.1 - 29.9 ) [Sodium restriction 2gm recommended] : sodium restriction 2 gm recommended [Non - Smoker] : non-smoker [Smoke/CO Detectors] : smoke/CO detectors [Medical alert] : medical alert [Use assistive device to avoid falls] : use assistive device to avoid falls [Remove clutter and unsafe carpeting to avoid falls] : remove clutter and unsafe carpeting to avoid falls [] : diabetic screening [Improve mobility] : improve mobility [Minimize unnecessary interventions] : minimize unnecessary interventions [Maintain functional ability] : maintain functional ability [Discussed disease trajectory with patient/caregiver] : discussed disease trajectory with patient/caregiver [Completed Medical Orders for Life-Sustaining Treatment] : completed medical orders for life-sustaining treatment [Full Code] : Code Status: Full Code [No Limitations] : Treatment Guidelines: No limitations [Long Term Intubation] : Intubation: Long term intubation [[facility] Date: _____[facility]/last verification date: [unfilled] [ - New patient with 2 or more chronic conditions; CCM discussed and patient-centered care plan established] : New patient with 2 or more chronic conditions; CCM discussed and patient-centered care plan established [FreeTextEntry3] : renal diet

## 2022-06-18 NOTE — H&P ADULT - HISTORY OF PRESENT ILLNESS
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 Patient brought back to [facility] ER for recurrent fevers despite abx with Augmentin and delirium. CXR from prior hospitalization with blunted costophrenic angles. Repeat CXR unchanged. COVID/RVP Negative. CT Chest WO done shows moderate-large pleural effusion on my review awaiting official read by Radiology. Pt evaluated by Vascular Surgery for L AKA in ER. CT Surgery consulted for effusion possible parapneumonic effusion. ID consulted. Discussed plan of care with daughter and grandson at bedside. DNR/DNI confirmed but want all other medical and surgical interventions. Agreed to reconsult Palliative Care. Family also requesting Cardiology Consult for PPM interrogation while inpt. ROS +Delirium +Insomnia (no sleep x48 hours) +Fevers. ROS negative unless mentioned.

## 2022-06-18 NOTE — ED PROVIDER NOTE - OBJECTIVE STATEMENT
89 yo male pmh esrd,  s/p left aka with recent discharge from [facility] 2 days ago; as per daughter, pt had fever during last admission  for which patient placed on zosyn and discharged on augmentin; pt noted on  nasal oxygen for decreased ot 2 sat at home;  pt with 1 episode of loose stool today; When patient discharged home noted not sleeping during the night;

## 2022-06-18 NOTE — HISTORY OF PRESENT ILLNESS
[Patient] : patient [Spouse] : spouse [Formal Caregiver] : formal caregiver [FreeTextEntry1] : CKD, CHF\par CKD, CHF [FreeTextEntry2] : PMH: 87 y/o male with history of ESRD on HD, AVM, CAD, HTN, HFpEF, HTN, HLD, AF, LLE DVT, AS s/p TAVR, PAD s/p recent L fem to AT bypass with cryovein c/b acute graft rethrombosis s/p thrombectomy.  Patient is being seen today to enroll into the [facility]. \par HPI/ROS reviewed by patient. wife, & HHA. \par \par Patient hospitalized earlier this month for GI Bleed - bleeding found around anal canal - proctitis & hemorrhoids. Rec'd PRBC during hospital admission. \par \par At today's visit, patient seen sitting on couch. He is alert & oriented, although wife does report intermittent periods of confusion - mostly in the morning and then will pass/improve as the day goes on.\par Currently, patient rates pain as 7/8 of 10 - it comes and goes - pain is to the left leg/foot. Pain is intermittent - comes and goes. \par skin: has homecare RN come in three days/week for eschar & wound on right leg. \par AF: on low does Eliquis, denies palpitations. rate controlled. \par ESRD: patient goes to HD two times/week. He still makes urine & refuses to go a third day. \par ambulation: wheelchair bound. non-ambulatory.  Two person assist to stand. \par appetite: picky eater, eats three meals/day. Will also drink a supplemental drink - one or two a day. \par GI/: daily BM's. wife will give him MiraLAX every other day. \par sleep: sleeping better since starting trazodone. He does nap on & off during the day. Sleeps on couch. \par

## 2022-06-19 NOTE — PROGRESS NOTE ADULT - SUBJECTIVE AND OBJECTIVE BOX
Hospitalist Daily Progress Note    Chief Complaint:  Patient is a 88y old  Male who presents with a chief complaint of Recurrent Fevers (19 Jun 2022 14:05)      SUBJECTIVE / OVERNIGHT EVENTS:  Patient was seen and examined at bedside. States to be hungry. No other complaints.   Patient denies chest pain, SOB, abd pain, N/V, fever, chills, dysuria or any other complaints. All remainder ROS negative.     MEDICATIONS  (STANDING):  aspirin  chewable 81 milliGRAM(s) Oral daily  atorvastatin 80 milliGRAM(s) Oral at bedtime  fentaNYL   Patch  12 MICROgram(s)/Hr 1 Patch Transdermal every 72 hours  heparin   Injectable 5000 Unit(s) SubCutaneous every 12 hours  isosorbide   mononitrate ER Tablet (IMDUR) 30 milliGRAM(s) Oral daily  lidocaine 1% (Preservative-free) Injectable 20 milliLiter(s) Local Injection once  melatonin 3 milliGRAM(s) Oral at bedtime  multivitamin 1 Tablet(s) Oral daily  Nephro-pito 1 Tablet(s) Oral at bedtime  pantoprazole    Tablet 40 milliGRAM(s) Oral before breakfast  piperacillin/tazobactam IVPB.. 3.375 Gram(s) IV Intermittent every 12 hours  QUEtiapine 25 milliGRAM(s) Oral at bedtime  sevelamer carbonate 800 milliGRAM(s) Oral every 8 hours  tamsulosin 0.4 milliGRAM(s) Oral at bedtime  torsemide 20 milliGRAM(s) Oral daily  traZODone 50 milliGRAM(s) Oral at bedtime    MEDICATIONS  (PRN):  acetaminophen     Tablet .. 650 milliGRAM(s) Oral every 6 hours PRN Temp greater or equal to 38C (100.4F), Mild Pain (1 - 3)  aluminum hydroxide/magnesium hydroxide/simethicone Suspension 30 milliLiter(s) Oral every 4 hours PRN Dyspepsia  melatonin 3 milliGRAM(s) Oral at bedtime PRN Insomnia  ondansetron Injectable 4 milliGRAM(s) IV Push every 8 hours PRN Nausea and/or Vomiting        I&O's Summary      PHYSICAL EXAM:  Vital Signs Last 24 Hrs  T(C): 37.5 (19 Jun 2022 08:06), Max: 38.3 (18 Jun 2022 22:28)  T(F): 99.5 (19 Jun 2022 08:06), Max: 101 (18 Jun 2022 22:28)  HR: 65 (19 Jun 2022 08:06) (50 - 88)  BP: 145/59 (19 Jun 2022 08:06) (110/40 - 145/66)  BP(mean): 68 (18 Jun 2022 22:28) (68 - 68)  RR: 18 (19 Jun 2022 08:06) (16 - 18)  SpO2: 98% (19 Jun 2022 08:06) (95% - 98%)      Constitutional: NAD, Resting, Chronically ill appearing male with NC   ENT: Supple, No JVD  Lungs: Decreased air entry,  right sided HD cath   Cardio: RRR, S1/S2, No murmur  Abdomen: Soft, Nontender, Nondistended; Bowel sounds present  Extremities: No calf tenderness, No pitting edema, Left AKA with no erythema noted  Musculoskeletal:   No clubbing or cyanosis of digits; no joint swelling or tenderness to palpation  Psych: Calm, cooperative affect appropriate  Neuro: Awake and alert, oriented x 2-3, moves extremities  Skin: No rashes; no palpable lesions    LABS:                        9.8    5.50  )-----------( 297      ( 19 Jun 2022 07:37 )             32.6     06-19    140  |  97<L>  |  28.1<H>  ----------------------------<  88  3.8   |  27.0  |  3.44<H>    Ca    9.2      19 Jun 2022 07:37  Phos  5.6     06-19  Mg     2.0     06-19    TPro  6.2<L>  /  Alb  2.8<L>  /  TBili  0.5  /  DBili  x   /  AST  25  /  ALT  16  /  AlkPhos  273<H>  06-19    PT/INR - ( 18 Jun 2022 17:17 )   PT: 16.2 sec;   INR: 1.39 ratio         PTT - ( 18 Jun 2022 17:17 )  PTT:37.2 sec          CAPILLARY BLOOD GLUCOSE            RADIOLOGY REVIEWED

## 2022-06-19 NOTE — PROGRESS NOTE ADULT - ASSESSMENT
89 yo M PMH ESRD on HD, anemia, TAVR, CHF with preserved EF, HTN, CAD, AFIB, PVD s/p recent bypass, LLE DVT, recently admitted with gastrointestinal bleeding, here with hemorrhagic shock related to graft bleeding s/p ligation of bleeding CryoVein fem-AT bypass, with AV graft thrombosis. Pt with recent admission to general surgery and LLE AKA. Patient has had pleural effusion in past sometime 2 years ago per family. Pt underwent CT chest which shows moderate right sided pleural effusion. Pt is resting in bed breathing comfortably but somewhat confused. Will discuss need for drainage of pleural effusion in AM.

## 2022-06-19 NOTE — PROGRESS NOTE ADULT - PROBLEM SELECTOR PLAN 1
Right pigtail catheter placed 6/19   serous fluid obtained  f/u pleural fluid analysis, culture and cytology  maintain PTC to H2O seal  record drainage q12 hours to facilitate timely removal  daily CXR while PTC in place  Plan discussed with Dr. Reynolds

## 2022-06-19 NOTE — PROGRESS NOTE ADULT - SUBJECTIVE AND OBJECTIVE BOX
HPI/OVERNIGHT EVENTS: Patient seen and examined at bedside this AM. No overnight events. No complaints. Denies fever, chills, nausea, vomiting, chest pain, SOB, dizziness, abd pain or any other concerning symptoms.    Vital Signs Last 24 Hrs  T(C): 38.3 (18 Jun 2022 22:28), Max: 38.3 (18 Jun 2022 22:28)  T(F): 101 (18 Jun 2022 22:28), Max: 101 (18 Jun 2022 22:28)  HR: 50 (18 Jun 2022 22:28) (50 - 88)  BP: 116/43 (18 Jun 2022 22:28) (110/40 - 123/59)  BP(mean): 68 (18 Jun 2022 22:28) (68 - 68)  RR: 18 (18 Jun 2022 22:28) (16 - 18)  SpO2: 95% (18 Jun 2022 22:28) (95% - 97%)    I&O's Detail      PHYSICAL EXAM:   General: NAD, Lying in bed comfortably  Neuro: A+Ox3  HEENT: EOMI, PERRLA, MMM, R IJ tunneled catheter w/o surrounding erythema nor evidence of infection.   Cardio: RRR  Resp: Non labored breathing on NC  GI/Abd: Soft, NT/ND, no rebound/guarding, no masses palpated  Vascular: All 4 extremities warm and well perfused.   Pelvis: stable  Musculoskeletal: LLE AKA wound has intact staples and no surrounding erythema.     LABS:                        11.0   5.32  )-----------( 349      ( 18 Jun 2022 17:17 )             35.9     06-18    135  |  92<L>  |  22.8<H>  ----------------------------<  136<H>  4.1   |  28.0  |  2.70<H>    Ca    10.0      18 Jun 2022 17:17    TPro  7.1  /  Alb  3.3  /  TBili  0.6  /  DBili  x   /  AST  30  /  ALT  16  /  AlkPhos  325<H>  06-18    PT/INR - ( 18 Jun 2022 17:17 )   PT: 16.2 sec;   INR: 1.39 ratio         PTT - ( 18 Jun 2022 17:17 )  PTT:37.2 sec      MEDICATIONS  (STANDING):  aspirin  chewable 81 milliGRAM(s) Oral daily  atorvastatin 80 milliGRAM(s) Oral at bedtime  fentaNYL   Patch  12 MICROgram(s)/Hr 1 Patch Transdermal every 72 hours  heparin   Injectable 5000 Unit(s) SubCutaneous every 12 hours  isosorbide   mononitrate ER Tablet (IMDUR) 30 milliGRAM(s) Oral daily  melatonin 3 milliGRAM(s) Oral at bedtime  multivitamin 1 Tablet(s) Oral daily  Nephro-pito 1 Tablet(s) Oral at bedtime  pantoprazole    Tablet 40 milliGRAM(s) Oral before breakfast  piperacillin/tazobactam IVPB.. 3.375 Gram(s) IV Intermittent every 12 hours  QUEtiapine 25 milliGRAM(s) Oral at bedtime  sevelamer carbonate 800 milliGRAM(s) Oral every 8 hours  tamsulosin 0.4 milliGRAM(s) Oral at bedtime  torsemide 20 milliGRAM(s) Oral daily  traZODone 50 milliGRAM(s) Oral at bedtime  vancomycin  IVPB 1000 milliGRAM(s) IV Intermittent once    MEDICATIONS  (PRN):  acetaminophen     Tablet .. 650 milliGRAM(s) Oral every 6 hours PRN Temp greater or equal to 38C (100.4F), Mild Pain (1 - 3)  aluminum hydroxide/magnesium hydroxide/simethicone Suspension 30 milliLiter(s) Oral every 4 hours PRN Dyspepsia  melatonin 3 milliGRAM(s) Oral at bedtime PRN Insomnia  ondansetron Injectable 4 milliGRAM(s) IV Push every 8 hours PRN Nausea and/or Vomiting      MICRO:   Cultures     STUDIES:   EKG, CXR, U/S, CT, MRI

## 2022-06-19 NOTE — PROGRESS NOTE ADULT - ASSESSMENT
Patient is a 88y old male with AMS. Found to have a pleural effusion as well.     PLAN:    - No vascular intervention required  - Will f/u blood cultures obtained in ER 6/18  - f/u CT surgery, possible pigtail today.  - Recommend medical evaluation for AMS  - Plan discussed with Attending, Dr. Mason

## 2022-06-19 NOTE — PROCEDURE NOTE - NSINFORMCONSENT_GEN_A_CORE
by nolan Douglas/Benefits, risks, and possible complications of procedure explained to patient/caregiver who verbalized understanding and gave verbal consent.

## 2022-06-19 NOTE — PROGRESS NOTE ADULT - SUBJECTIVE AND OBJECTIVE BOX
Subjective: Patient seen to evaluate pleural effusion.     Pertinent events of the past 24 hours: Uneventful, recovering as expected    VITAL SIGNS  Vital Signs Last 24 Hrs  T(C): 37.5 (22 @ 08:06), Max: 38.3 (22 @ 22:28)  T(F): 99.5 (22 @ 08:06), Max: 101 (22 @ 22:28)  HR: 65 (22 @ 08:06) (50 - 88)  BP: 145/59 (22 @ 08:06) (110/40 - 145/66)  RR: 18 (22 @ 08:06) (16 - 18)  SpO2: 98% (22 @ 08:06) (95% - 98%)  on (O2)              MEDICATIONS  acetaminophen     Tablet .. 650 milliGRAM(s) Oral every 6 hours PRN  aluminum hydroxide/magnesium hydroxide/simethicone Suspension 30 milliLiter(s) Oral every 4 hours PRN  aspirin  chewable 81 milliGRAM(s) Oral daily  atorvastatin 80 milliGRAM(s) Oral at bedtime  fentaNYL   Patch  12 MICROgram(s)/Hr 1 Patch Transdermal every 72 hours  heparin   Injectable 5000 Unit(s) SubCutaneous every 12 hours  isosorbide   mononitrate ER Tablet (IMDUR) 30 milliGRAM(s) Oral daily  melatonin 3 milliGRAM(s) Oral at bedtime PRN  melatonin 3 milliGRAM(s) Oral at bedtime  multivitamin 1 Tablet(s) Oral daily  Nephro-pito 1 Tablet(s) Oral at bedtime  ondansetron Injectable 4 milliGRAM(s) IV Push every 8 hours PRN  pantoprazole    Tablet 40 milliGRAM(s) Oral before breakfast  piperacillin/tazobactam IVPB.. 3.375 Gram(s) IV Intermittent every 12 hours  QUEtiapine 25 milliGRAM(s) Oral at bedtime  sevelamer carbonate 800 milliGRAM(s) Oral every 8 hours  tamsulosin 0.4 milliGRAM(s) Oral at bedtime  torsemide 20 milliGRAM(s) Oral daily  traZODone 50 milliGRAM(s) Oral at bedtime      PHYSICAL EXAM  to follow with full note       Weights:  Daily Height in cm: 165.1 (2022 15:53)    Daily Weight in k.5 (2022 04:45)  Admit Wt: Drug Dosing Weight  Height (cm): 165.1 (2022 15:53)  Weight (kg): 68 (2022 15:53)  BMI (kg/m2): 24.9 (2022 15:53)  BSA (m2): 1.75 (2022 15:53)    All laboratory results, radiology and medications reviewed.    LABS      140  |  97<L>  |  28.1<H>  ----------------------------<  88  3.8   |  27.0  |  3.44<H>    Ca    9.2      2022 07:37  Phos  5.6       Mg     2.0         TPro  6.2<L>  /  Alb  2.8<L>  /  TBili  0.5  /  DBili  x   /  AST  25  /  ALT  16  /  AlkPhos  273<H>                                   9.8    5.50  )-----------( 297      ( 2022 07:37 )             32.6          PT/INR - ( 2022 17:17 )   PT: 16.2 sec;   INR: 1.39 ratio         PTT - ( 2022 17:17 )  PTT:37.2 sec  Bilirubin Total, Serum: 0.5 mg/dL ( @ 07:37)  Bilirubin Total, Serum: 0.6 mg/dL ( @ 17:17)    Last CXR:    Last EKG:    PAST MEDICAL & SURGICAL HISTORY:  DM (diabetes mellitus)  - resolved      AV block, 1st degree      Arrhythmia      CAD (coronary artery disease)      HTN (hypertension)      VT (ventricular tachycardia)      RICHARSD (dyspnea on exertion)      Anemia      Risk factors for obstructive sleep apnea      ESRD on dialysis  HD on  and       Aortic stenosis  - s/p valve replacement      GI bleeding  due to ulcerated polyps and colonic AVMs      H/O circumcision  at  age  65      H/O left knee surgery      H/O angioplasty  ,  no  intervention      A-V fistula  left arm 2017      H/O carotid endarterectomy  Right      S/P TAVR (transcatheter aortic valve replacement)      History of loop recorder          Subjective: Patient seen to evaluate pleural effusion. Patient sitting in bed saying "whats up?" Denies acute complaints of pain, chest pain, shortness of breath, difficulty breathing, nausea or vomiting.     Pertinent events of the past 24 hours: Uneventful, recovering as expected    VITAL SIGNS  Vital Signs Last 24 Hrs  T(C): 37.5 (22 @ 08:06), Max: 38.3 (22 @ 22:28)  T(F): 99.5 (22 @ 08:06), Max: 101 (22 @ 22:28)  HR: 65 (22 @ 08:06) (50 - 88)  BP: 145/59 (22 @ 08:06) (110/40 - 145/66)  RR: 18 (22 @ 08:06) (16 - 18)  SpO2: 98% (22 @ 08:06) (95% - 98%)  on (O2)              MEDICATIONS  acetaminophen     Tablet .. 650 milliGRAM(s) Oral every 6 hours PRN  aluminum hydroxide/magnesium hydroxide/simethicone Suspension 30 milliLiter(s) Oral every 4 hours PRN  aspirin  chewable 81 milliGRAM(s) Oral daily  atorvastatin 80 milliGRAM(s) Oral at bedtime  fentaNYL   Patch  12 MICROgram(s)/Hr 1 Patch Transdermal every 72 hours  heparin   Injectable 5000 Unit(s) SubCutaneous every 12 hours  isosorbide   mononitrate ER Tablet (IMDUR) 30 milliGRAM(s) Oral daily  melatonin 3 milliGRAM(s) Oral at bedtime PRN  melatonin 3 milliGRAM(s) Oral at bedtime  multivitamin 1 Tablet(s) Oral daily  Nephro-pito 1 Tablet(s) Oral at bedtime  ondansetron Injectable 4 milliGRAM(s) IV Push every 8 hours PRN  pantoprazole    Tablet 40 milliGRAM(s) Oral before breakfast  piperacillin/tazobactam IVPB.. 3.375 Gram(s) IV Intermittent every 12 hours  QUEtiapine 25 milliGRAM(s) Oral at bedtime  sevelamer carbonate 800 milliGRAM(s) Oral every 8 hours  tamsulosin 0.4 milliGRAM(s) Oral at bedtime  torsemide 20 milliGRAM(s) Oral daily  traZODone 50 milliGRAM(s) Oral at bedtime    PHYSICAL EXAM  Constitutional: NAD  Neuro: A+O x 2-3, forgetful, non-focal, speech clear and intact  HEENT: [place], NC/AT  CV: +S1S2, no murmurs or rub  Pulm/chest: NC, diminished at bases bilaterally, no accessory muscle use noted  Abd: +BS, soft, NT, ND  Ext: Left AKA, no edema  Skin: warm, well perfused  Psych: calm, appropriate affect    Weights:  Daily Height in cm: 165.1 (2022 15:53)    Daily Weight in k.5 (2022 04:45)  Admit Wt: Drug Dosing Weight  Height (cm): 165.1 (2022 15:53)  Weight (kg): 68 (2022 15:53)  BMI (kg/m2): 24.9 (2022 15:53)  BSA (m2): 1.75 (2022 15:53)    All laboratory results, radiology and medications reviewed.    LABS      140  |  97<L>  |  28.1<H>  ----------------------------<  88  3.8   |  27.0  |  3.44<H>    Ca    9.2      2022 07:37  Phos  5.6       Mg     2.0         TPro  6.2<L>  /  Alb  2.8<L>  /  TBili  0.5  /  DBili  x   /  AST  25  /  ALT  16  /  AlkPhos  273<H>                                   9.8    5.50  )-----------( 297      ( 2022 07:37 )             32.6          PT/INR - ( 2022 17:17 )   PT: 16.2 sec;   INR: 1.39 ratio         PTT - ( 2022 17:17 )  PTT:37.2 sec  Bilirubin Total, Serum: 0.5 mg/dL ( @ 07:37)  Bilirubin Total, Serum: 0.6 mg/dL ( @ 17:17)    Last CXR: < from: Xray Chest 1 View- PORTABLE-Urgent (22 @ 17:05) >  INTERPRETATION:  Clinical history: 88-year-old male, sepsis.    Portable view of the chest is correlated with a concurrent chest CT.    FINDINGS: Mild cardiomegaly and normal pulmonary vasculature with no   pneumothorax or acute osseous finding.    Large right and small left pleural effusions with adjacent atelectasis,   better characterized on CT.    Post TAVR, cardiac loop recorder noted.    Right-sided dialysis catheters with the tip at the junction of the SVC   and right atrium noted.    IMPRESSION:  Moderate right and small left pleural effusion, better characterized on   concurrent chest CT    < end of copied text >  < from: CT Chest No Cont (22 @ 20:58) >  INTERPRETATION:  CT CHEST WITH CONTRAST    INDICATION: And shortness of breath. Weakness.    TECHNIQUE: Unenhanced helical images were obtained of the chest. Coronal   and sagittal images were reconstructed. Maximum intensity projection   images were generated.    COMPARISON: Radiograph 2022. CT chest 2021 and 2020.    FINDINGS:    Tubes/Lines: Catheter via right-sided approach with its tipin the SVC.    Lungs, airways and pleura: Bilateral pleural effusions (right greater   than left).    Complete atelectasis of the right lower lobe. Passive atelectasis in the   right middle lobe and left lower lobe.    There is bilateral septal thickening and groundglass opacities. In   addition, there are bilateral 3 mm pulmonary nodules, some which have a   branching distribution.    The airways are unremarkable.    Mediastinum: The thyroid gland is unremarkable. The chest lymph nodes   measureless than 10 mm the short axis. The esophagus is unremarkable.    The heart is enlarged. Coronary artery calcifications. Mitral annular   calcification. Status post TAVR.    Upper Abdomen: Cholelithiasis. Upper abdominal ascites. Partially imaged   hypodense left renal lesions likely represent cysts.    Bones And Soft Tissues: Loop recorder.  The soft tissues are   unremarkable. Spondylosis of the thoracic spine.      IMPRESSION:    1.  Bilateral pleural effusions (right greater than left).  2.  Complete passive atelectasis of the right lower lobe.  3.  Bilateral linear atelectasis.  4.  Groundglass opacities and septal thickening are of uncertain etiology.  5.  Bilateral pulmonary nodules, some which have a branching morphology   are uncertain etiology. A follow-up is recommended in 4-6 weeks to   evaluate for resolution/change    < end of copied text >        PAST MEDICAL & SURGICAL HISTORY:  DM (diabetes mellitus)  - resolved      AV block, 1st degree      Arrhythmia      CAD (coronary artery disease)      HTN (hypertension)      VT (ventricular tachycardia)      RICHARDS (dyspnea on exertion)      Anemia      Risk factors for obstructive sleep apnea      ESRD on dialysis  HD on  and       Aortic stenosis  - s/p valve replacement      GI bleeding  due to ulcerated polyps and colonic AVMs      H/O circumcision  at  age  65      H/O left knee surgery      H/O angioplasty  ,  no  intervention      A-V fistula  left arm 2017      H/O carotid endarterectomy  Right      S/P TAVR (transcatheter aortic valve replacement)      History of loop recorder          Subjective: Patient seen to evaluate pleural effusion. Patient sitting in bed saying "whats up?" Denies acute complaints of pain, chest pain, shortness of breath, difficulty breathing, nausea or vomiting.     Pertinent events of the past 24 hours: Uneventful, recovering as expected    VITAL SIGNS  Vital Signs Last 24 Hrs  T(C): 37.5 (22 @ 08:06), Max: 38.3 (22 @ 22:28)  T(F): 99.5 (22 @ 08:06), Max: 101 (22 @ 22:28)  HR: 65 (22 @ 08:06) (50 - 88)  BP: 145/59 (22 @ 08:06) (110/40 - 145/66)  RR: 18 (22 @ 08:06) (16 - 18)  SpO2: 98% (22 @ 08:06) (95% - 98%)  on (O2)              MEDICATIONS  acetaminophen     Tablet .. 650 milliGRAM(s) Oral every 6 hours PRN  aluminum hydroxide/magnesium hydroxide/simethicone Suspension 30 milliLiter(s) Oral every 4 hours PRN  aspirin  chewable 81 milliGRAM(s) Oral daily  atorvastatin 80 milliGRAM(s) Oral at bedtime  fentaNYL   Patch  12 MICROgram(s)/Hr 1 Patch Transdermal every 72 hours  heparin   Injectable 5000 Unit(s) SubCutaneous every 12 hours  isosorbide   mononitrate ER Tablet (IMDUR) 30 milliGRAM(s) Oral daily  melatonin 3 milliGRAM(s) Oral at bedtime PRN  melatonin 3 milliGRAM(s) Oral at bedtime  multivitamin 1 Tablet(s) Oral daily  Nephro-pito 1 Tablet(s) Oral at bedtime  ondansetron Injectable 4 milliGRAM(s) IV Push every 8 hours PRN  pantoprazole    Tablet 40 milliGRAM(s) Oral before breakfast  piperacillin/tazobactam IVPB.. 3.375 Gram(s) IV Intermittent every 12 hours  QUEtiapine 25 milliGRAM(s) Oral at bedtime  sevelamer carbonate 800 milliGRAM(s) Oral every 8 hours  tamsulosin 0.4 milliGRAM(s) Oral at bedtime  torsemide 20 milliGRAM(s) Oral daily  traZODone 50 milliGRAM(s) Oral at bedtime    PHYSICAL EXAM  Constitutional: NAD  Neuro: A+O x 2-3, forgetful, non-focal, speech clear and intact  HEENT: [place], [place]/AT  CV: +S1S2, no murmurs or rub  Pulm/chest: NC, diminished at bases bilaterally, no accessory muscle use noted  Abd: +BS, soft, NT, ND  Ext: Left AKA, no edema  Skin: warm, well perfused  Psych: calm, appropriate affect  Drains: Right pigtail chest tube to H2O seal, draining serous fluid, no AL, no SQ air     Weights:  Daily Height in cm: 165.1 (2022 15:53)    Daily Weight in k.5 (2022 04:45)  Admit Wt: Drug Dosing Weight  Height (cm): 165.1 (2022 15:53)  Weight (kg): 68 (2022 15:53)  BMI (kg/m2): 24.9 (2022 15:53)  BSA (m2): 1.75 (2022 15:53)    All laboratory results, radiology and medications reviewed.    LABS      140  |  97<L>  |  28.1<H>  ----------------------------<  88  3.8   |  27.0  |  3.44<H>    Ca    9.2      2022 07:37  Phos  5.6       Mg     2.0         TPro  6.2<L>  /  Alb  2.8<L>  /  TBili  0.5  /  DBili  x   /  AST  25  /  ALT  16  /  AlkPhos  273<H>                                   9.8    5.50  )-----------( 297      ( 2022 07:37 )             32.6          PT/INR - ( 2022 17:17 )   PT: 16.2 sec;   INR: 1.39 ratio         PTT - ( 2022 17:17 )  PTT:37.2 sec  Bilirubin Total, Serum: 0.5 mg/dL ( @ 07:37)  Bilirubin Total, Serum: 0.6 mg/dL ( @ 17:17)    Last CXR: < from: Xray Chest 1 View- PORTABLE-Urgent (22 @ 17:05) >  INTERPRETATION:  Clinical history: 88-year-old male, sepsis.    Portable view of the chest is correlated with a concurrent chest CT.    FINDINGS: Mild cardiomegaly and normal pulmonary vasculature with no   pneumothorax or acute osseous finding.    Large right and small left pleural effusions with adjacent atelectasis,   better characterized on CT.    Post TAVR, cardiac loop recorder noted.    Right-sided dialysis catheters with the tip at the junction of the SVC   and right atrium noted.    IMPRESSION:  Moderate right and small left pleural effusion, better characterized on   concurrent chest CT    < end of copied text >  < from: CT Chest No Cont (22 @ 20:58) >  INTERPRETATION:  CT CHEST WITH CONTRAST    INDICATION: And shortness of breath. Weakness.    TECHNIQUE: Unenhanced helical images were obtained of the chest. Coronal   and sagittal images were reconstructed. Maximum intensity projection   images were generated.    COMPARISON: Radiograph 2022. CT chest 2021 and 2020.    FINDINGS:    Tubes/Lines: Catheter via right-sided approach with its tipin the SVC.    Lungs, airways and pleura: Bilateral pleural effusions (right greater   than left).    Complete atelectasis of the right lower lobe. Passive atelectasis in the   right middle lobe and left lower lobe.    There is bilateral septal thickening and groundglass opacities. In   addition, there are bilateral 3 mm pulmonary nodules, some which have a   branching distribution.    The airways are unremarkable.    Mediastinum: The thyroid gland is unremarkable. The chest lymph nodes   measureless than 10 mm the short axis. The esophagus is unremarkable.    The heart is enlarged. Coronary artery calcifications. Mitral annular   calcification. Status post TAVR.    Upper Abdomen: Cholelithiasis. Upper abdominal ascites. Partially imaged   hypodense left renal lesions likely represent cysts.    Bones And Soft Tissues: Loop recorder.  The soft tissues are   unremarkable. Spondylosis of the thoracic spine.      IMPRESSION:    1.  Bilateral pleural effusions (right greater than left).  2.  Complete passive atelectasis of the right lower lobe.  3.  Bilateral linear atelectasis.  4.  Groundglass opacities and septal thickening are of uncertain etiology.  5.  Bilateral pulmonary nodules, some which have a branching morphology   are uncertain etiology. A follow-up is recommended in 4-6 weeks to   evaluate for resolution/change    < end of copied text >        PAST MEDICAL & SURGICAL HISTORY:  DM (diabetes mellitus)  - resolved      AV block, 1st degree      Arrhythmia      CAD (coronary artery disease)      HTN (hypertension)      VT (ventricular tachycardia)      RICHARDS (dyspnea on exertion)      Anemia      Risk factors for obstructive sleep apnea      ESRD on dialysis  HD on  and       Aortic stenosis  - s/p valve replacement      GI bleeding  due to ulcerated polyps and colonic AVMs      H/O circumcision  at  age  65      H/O left knee surgery      H/O angioplasty  ,  no  intervention      A-V fistula  left arm 2017      H/O carotid endarterectomy  Right      S/P TAVR (transcatheter aortic valve replacement)      History of loop recorder

## 2022-06-20 NOTE — PROGRESS NOTE ADULT - ASSESSMENT
88M with PMHX ESRD/HD ([facility]), Hx GIB 2/2 AVM, CAD, HTN, HLD, PPM, CHFpEF, PAD s/p L Fem Bypass with cryovein c/b acute graft rethrombosis s/p thrombectomy now s/p L AKA discharged 2 days ago from Vascular Service after SICU admission for Hemorrhagic Shock s/p L AKA on Augmentin for "FUO" discharged two days admitted for Recurrent Fevers and AMS r/o Pleural Effusion/Parapneumonic Effusions.    #Sepsis secondary to PNA c/b Parapneumonic Effusions   Oxygen via NC  CXR +blunted costophrenic angles  CT Chest with b/l pleural effusions. - Needs repeat SCan in 4-6 weeks for b/l pulmonary nodules Discontinue Augmentin  C/w Zosyn  Vancomycin 1g IV x1  COVID/RVP Negative  Repeat BCX pending  MRSA PCR Neg  Legionella/Strep PNA pending  CT Surgery recs appreciated - S/p Chest tube placement   Vascular Sx recs appreciated  ID recs appreciated     #PAD s/p L Fem Bypass with cryovein c/b acute graft rethrombosis s/p thrombectomy now s/p L AKA  Fentanyl Patch  Vascular Surgery recs appreciated    #Hx LLE DVT, Hx AFIB  Hemorrhagic Shock last admission on Eliquis  AC Held during last hospitalization    #ESRD/HD   Torsemide 20mg PO q24  Renal recs appreciated  Nephrovite Daily  Renal Diet + Sevelamer TID ACHS  unalbe to get HD on 6/20 for hypotension  Will monitor for now     #Hypotension/Bradycardia   /43 on admission.   250cc IVFB NSS given in ED  Hold Nifedipine 90mg qAM  Minesh consulted   Started on midodrine    #HTN, HLD, CAD, CHFpEF  Torsemide 20mg PO q24  Hold Nifedipine 90mg PO q24  ASA 81mg PO q24  Atorvastatin 80mg PO q24  Imdur 30mg ER q24    #Delirium  Melatonin 3mg PO q24  Trazodone 50mg PO q24  Seroquel 25mg PO qHS -> Discussed titrating dose up until patient able to sleep overnight if no sleep tonight would go to 50mg PO BID     #Constipation  Hold Miralax and Senna given antibiotic-induced loose stool reported    Goals of Care  -DNR/DNI Confirmed. See MOLST Prior. Family wants all other medical and surgical options apart from CPR/Mechanical Ventilation. Palliative Consult for continuity.      Dispo: Pending course    Spoke to patients daughter Vanessa over the phone and all questions answered.

## 2022-06-20 NOTE — PROGRESS NOTE ADULT - PROBLEM SELECTOR PLAN 1
Right pigtail catheter placed 6/19   Maintain RIGHT pigtail to water seal  CXR each AM while chest tube is in place  Light's criteria - Transudative   Pleural Fluid: Gram stain NGTD, fungal pending  Pleural fluid cyto pending   Plan discussed with attending Thoracic Surgeons in AM rounds

## 2022-06-20 NOTE — SWALLOW BEDSIDE ASSESSMENT ADULT - SWALLOW EVAL: DIAGNOSIS
Oral stage - impacted by incompleted dentition, functional for soft/bite sized solids. Pharyngeal stage appears functional for administered solids and mildly thick liquids. Unable to r/o pharyngeal dysphagia with thin liquids at time of evaluation with delayed cough, mild increase in wet vocal quality, and  report of "I choke on water" stated by pt.

## 2022-06-20 NOTE — PROGRESS NOTE ADULT - SUBJECTIVE AND OBJECTIVE BOX
Hospitalist Daily Progress Note    Chief Complaint:  Patient is a 88y old  Male who presents with a chief complaint of Recurrent Fevers (19 Jun 2022 14:59)      SUBJECTIVE / OVERNIGHT EVENTS:  Patient was seen and examined at bedside. Unable to get HD today secondary to Hypotension. No active complaints.   Patient denies chest pain, SOB, abd pain, N/V, fever, chills, dysuria or any other complaints. All remainder ROS negative.     MEDICATIONS  (STANDING):  aspirin  chewable 81 milliGRAM(s) Oral daily  atorvastatin 80 milliGRAM(s) Oral at bedtime  chlorhexidine 2% Cloths 1 Application(s) Topical daily  fentaNYL   Patch  12 MICROgram(s)/Hr 1 Patch Transdermal every 72 hours  heparin   Injectable 5000 Unit(s) SubCutaneous every 12 hours  isosorbide   mononitrate ER Tablet (IMDUR) 30 milliGRAM(s) Oral daily  lidocaine 1% (Preservative-free) Injectable 20 milliLiter(s) Local Injection once  melatonin 3 milliGRAM(s) Oral at bedtime  midodrine. 10 milliGRAM(s) Oral three times a day  multivitamin 1 Tablet(s) Oral daily  Nephro-pito 1 Tablet(s) Oral at bedtime  pantoprazole    Tablet 40 milliGRAM(s) Oral before breakfast  piperacillin/tazobactam IVPB.. 3.375 Gram(s) IV Intermittent every 12 hours  QUEtiapine 25 milliGRAM(s) Oral at bedtime  sevelamer carbonate 800 milliGRAM(s) Oral every 8 hours  tamsulosin 0.4 milliGRAM(s) Oral at bedtime  traZODone 50 milliGRAM(s) Oral at bedtime    MEDICATIONS  (PRN):  acetaminophen     Tablet .. 650 milliGRAM(s) Oral every 6 hours PRN Temp greater or equal to 38C (100.4F), Mild Pain (1 - 3)  melatonin 3 milliGRAM(s) Oral at bedtime PRN Insomnia  ondansetron Injectable 4 milliGRAM(s) IV Push every 8 hours PRN Nausea and/or Vomiting        I&O's Summary    19 Jun 2022 07:01  -  20 Jun 2022 07:00  --------------------------------------------------------  IN: 100 mL / OUT: 890 mL / NET: -790 mL        PHYSICAL EXAM:  Vital Signs Last 24 Hrs  T(C): 36.9 (20 Jun 2022 08:45), Max: 38.9 (19 Jun 2022 13:25)  T(F): 98.5 (20 Jun 2022 08:45), Max: 102 (19 Jun 2022 13:25)  HR: 60 (20 Jun 2022 10:30) (54 - 68)  BP: 108/51 (20 Jun 2022 10:30) (84/42 - 138/60)  BP(mean): --  RR: 18 (20 Jun 2022 09:31) (18 - 18)  SpO2: 100% (20 Jun 2022 09:31) (98% - 100%)      Constitutional: NAD, Resting, NC  ENT: Supple, No JVD  Lungs: CTA B/L, Non-labored breathing, Right sided pigtail in place  Cardio: RRR, S1/S2, No murmur  Abdomen: Soft, Nontender, Nondistended; Bowel sounds present  Extremities: No calf tenderness, Left AKA   Musculoskeletal:   No clubbing or cyanosis of digits; no joint swelling or tenderness to palpation  Psych: Calm, cooperative affect appropriate  Neuro: Awake and alert, oriented x 4  Skin: No rashes; no palpable lesions    LABS:                        9.6    5.47  )-----------( 313      ( 20 Jun 2022 07:07 )             32.5     06-20    138  |  95<L>  |  36.6<H>  ----------------------------<  110<H>  4.4   |  27.0  |  4.55<H>    Ca    8.6      20 Jun 2022 07:07  Phos  5.6     06-19  Mg     2.0     06-19    TPro  6.1<L>  /  Alb  2.8<L>  /  TBili  0.5  /  DBili  x   /  AST  20  /  ALT  13  /  AlkPhos  243<H>  06-20    PT/INR - ( 18 Jun 2022 17:17 )   PT: 16.2 sec;   INR: 1.39 ratio         PTT - ( 18 Jun 2022 17:17 )  PTT:37.2 sec          Culture - Fungal, Body Fluid (collected 19 Jun 2022 18:21)  Source: .Body Fluid Pleural Fluid  Preliminary Report (20 Jun 2022 11:16):    Testing in progress    Culture - Body Fluid with Gram Stain (collected 19 Jun 2022 18:21)  Source: .Body Fluid Pleural Fluid  Gram Stain (19 Jun 2022 23:08):    polymorphonuclear leukocytes seen    No organisms seen    by cytocentrifuge    Culture - Blood (collected 19 Jun 2022 03:11)  Source: .Blood Blood-Peripheral  Preliminary Report (20 Jun 2022 04:01):    No growth to date.    Culture - Blood (collected 19 Jun 2022 03:08)  Source: .Blood Blood-Peripheral  Preliminary Report (20 Jun 2022 04:01):    No growth to date.      CAPILLARY BLOOD GLUCOSE            RADIOLOGY REVIEWED

## 2022-06-20 NOTE — PROGRESS NOTE ADULT - ASSESSMENT
87 yo M PMH ESRD on HD, anemia, TAVR, CHF with preserved EF, HTN, CAD, AFIB, PVD s/p recent bypass, LLE DVT, recently admitted with gastrointestinal bleeding, here with hemorrhagic shock related to graft bleeding s/p ligation of bleeding CryoVein fem-AT bypass, with AV graft thrombosis. Pt with recent admission to general surgery and LLE AKA. Patient has had pleural effusion in past sometime 2 years ago per family. Pt underwent CT chest which shows moderate right sided pleural effusion. Right pigtail catheter was placed 6/19/22 with 750cc serous fluid drained.

## 2022-06-20 NOTE — PROGRESS NOTE ADULT - SUBJECTIVE AND OBJECTIVE BOX
Subjective:    Vital Signs:  Vital Signs Last 24 Hrs  T(C): 36.9 (06-20-22 @ 08:45), Max: 38.9 (06-19-22 @ 13:25)  T(F): 98.5 (06-20-22 @ 08:45), Max: 102 (06-19-22 @ 13:25)  HR: 60 (06-20-22 @ 10:30) (54 - 68)  BP: 108/51 (06-20-22 @ 10:30) (84/42 - 138/60)  RR: 18 (06-20-22 @ 09:31) (18 - 18)  SpO2: 100% (06-20-22 @ 09:31) (98% - 100%) on (O2)    Relevant labs, radiology and Medications reviewed    Pertinent Physical Exam      06-19 @ 07:01  -  06-20 @ 07:00  --------------------------------------------------------  IN:    Oral Fluid: 100 mL  Total IN: 100 mL    OUT:    Chest Tube (mL): 890 mL  Total OUT: 890 mL    Total NET: -790 mL                Subjective: "I'm doing okay."  Patient denies acute pain with radiating or aggravating factors.  He denies chest pain, shortness of breath, palpitations, headache, dizziness, nausea, or vomiting.     Vital Signs:  Vital Signs Last 24 Hrs  T(C): 36.9 (06-20-22 @ 08:45), Max: 38.9 (06-19-22 @ 13:25)  T(F): 98.5 (06-20-22 @ 08:45), Max: 102 (06-19-22 @ 13:25)  HR: 60 (06-20-22 @ 10:30) (54 - 68)  BP: 108/51 (06-20-22 @ 10:30) (84/42 - 138/60)  RR: 18 (06-20-22 @ 09:31) (18 - 18)  SpO2: 100% (06-20-22 @ 09:31) (98% - 100%) on (O2)    Relevant labs, radiology and Medications reviewed    Pertinent Physical Exam  General: Well appearing, NAD  Neuro: AxO x3, non-focal, MARQUEZ  Cardiac: S1S2, no murmurs  Pulm: coarse breath sounds b/l, no wheezing or rales  Abdomen: Soft, NT, ND, hypoactive BS  Peripheral: +DP pulses RLE, no peripheral edema       06-19 @ 07:01  -  06-20 @ 07:00  --------------------------------------------------------  IN:    Oral Fluid: 100 mL  Total IN: 100 mL    OUT:    Chest Tube (mL): 890 mL  Total OUT: 890 mL    Total NET: -790 mL

## 2022-06-20 NOTE — SWALLOW BEDSIDE ASSESSMENT ADULT - ORAL PHASE
Within functional limits Decreased anterior-posterior movement of the bolus posterior loss suspected

## 2022-06-20 NOTE — SWALLOW BEDSIDE ASSESSMENT ADULT - SLP GENERAL OBSERVATIONS
Pt received & seen seated upright in bed, awake/alert, suspect [place], reduced cognition, required encouragement to accept PO,  0/10 pain pre/post eval.

## 2022-06-20 NOTE — SWALLOW BEDSIDE ASSESSMENT ADULT - SLP PERTINENT HISTORY OF CURRENT PROBLEM
Pt known to this service, seen for bedside assessment 6/1 with rx for soft/bite sized solids, thin liquids. Pt subsequently had an episode of "choking" on water, and was reevaluated and placed on a puree/no liqudi diet. Pt known to this service, seen for bedside assessment 6/1 with rx for soft/bite sized solids, thin liquids. Pt subsequently had an episode of "choking" on water, and was reevaluated and placed on a puree/no liqudi diet. Per request of the pt's daughter, pt resumed a regular diet with thin liquids, and this service signed off.  A new order is now received with request to evaluate pt's swallow function to determine most appropriate diet consistencies

## 2022-06-20 NOTE — PROGRESS NOTE ADULT - ASSESSMENT
This 88 y.o. M with PMHX ESRD/HD on Mondays and Fridays ([facility]), Hx GIB 2/2 AVM, CAD, HTN, HLD, PPM, CHFpEF, PAD s/p L Fem Bypass with cryovein c/b acute graft rethrombosis s/p thrombectomy now s/p L AKA discharged 2 days ago from Vascular Service after SICU admission for Hemorrhagic Shock s/p L AKA on Augmentin for "FUO". Patient brought back to [facility] ER for recurrent fevers despite abx with Augmentin and delirium. CXR from prior hospitalization with blunted costophrenic angles.   COVID/RVP Negative . Pt evaluated by Vascular Surgery for L AKA in ER. CT Surgery consulted for effusion possible parapneumonic effusion. ID consulted. Discussed plan of care with daughter and grandson at bedside. DNR/DNI confirmed but want all other medical and surgical interventions.  (18 Jun 2022 23:40)    daughter at bedside.   recently discharged 6/16/22 per daughter, had low grade fever at that time.   at home progressively decline.  EMS was called, evaluated the patient, and brought to hospital.     fevers noted here.   CT scan showed:  1.  Bilateral pleural effusions (right greater than left).  2.  Complete passive atelectasis of the right lower lobe.  3.  Bilateral linear atelectasis.  4.  Groundglass opacities and septal thickening are of uncertain etiology.  5.  Bilateral pulmonary nodules, some which have a branching morphology   are uncertain etiology. A follow-up is recommended in 4-6 weeks to   evaluate for resolution/change    patient started on empiric antibiotics with zosyn.     IMpression:  fevers  weakness  pleural effusions  ESRD on HD      Plan:  workup in progress.    - follow up all outstanding cultures  - trend temperature and WBC curve  - repeat cultures from blood and all sources if febrile.    patient remains stable; WILL continue zosyn 3.375 grams Q 12H    s/p drainage of pleural effusion 6/19/22  - follow up on culture      ESRD on HD  - renal following

## 2022-06-20 NOTE — SWALLOW BEDSIDE ASSESSMENT ADULT - ORAL PREPARATORY PHASE
- impacted by incomplete dentition and loose upper dentures/Decreased mastication ability Within functional limits

## 2022-06-20 NOTE — PROGRESS NOTE ADULT - SUBJECTIVE AND OBJECTIVE BOX
[facility]                                                INFECTIOUS DISEASES  =======================================================                               Jeet Bryan MD#  Mango Oliver MD*                                     Andrew Kaba MD*    Radha Short MD*            Diplomates American Board of Internal Medicine & Infectious Diseases                  # [place] Office - Appt - Tel  [phone] Fax [phone]                * [place] Office - Appt - Tel [phone] Fax [phone]                                  Hospital Consult line:  [phone]  =======================================================    [facility]-[id number]  CHRISTIANO ELIZABETH   follow up: weakness, SOB    s/p interval placement of a right chest tube.        I have personally reviewed the labs and data; pertinent labs and data are listed in this note; please see below.   =======================================================  Past Medical & Surgical Hx:  =====================  PAST MEDICAL & SURGICAL HISTORY:  DM (diabetes mellitus) - resolved  AV block, 1st degree  Arrhythmia  CAD (coronary artery disease)  HTN (hypertension)  VT (ventricular tachycardia)  RICHARDS (dyspnea on exertion)  Anemia  Risk factors for obstructive sleep apnea  ESRD on dialysis HD on Mondays and Fridays  Aortic stenosis - s/p valve replacement  GI bleeding due to ulcerated polyps and colonic AVMs  H/O circumcision at  age  65  H/O left knee surgery  H/O angioplasty 2013,  no  intervention  A-V fistula left arm 5/2017  H/O carotid endarterectomy Right  S/P TAVR (transcatheter aortic valve replacement)  History of loop recorder    Problem List:  ==========  HEALTH ISSUES - PROBLEM Dx:  Pleural effusion    Social Hx:  =======  no toxic habits currently    FAMILY HISTORY:  Family history of cancer (Grandparent)  Family history of lung cancer (Grandparent)  Family history of premature CAD  FH: type 2 diabetes    no significant family history of immunosuppressive disorders in mother or father   =======================================================    REVIEW OF SYSTEMS:  CONSTITUTIONAL:  + WEAKNESS  HEENT:  No diplopia or blurred vision.  No earache, sore throat or runny nose.  CARDIOVASCULAR:  No pressure, squeezing, strangling, tightness, heaviness or aching about the chest, neck, axilla or epigastrium.  RESPIRATORY:  No cough, shortness of breath  GASTROINTESTINAL:  No nausea, vomiting or diarrhea.  GENITOURINARY:  No dysuria, frequency or urgency. No Blood in urine  MUSCULOSKELETAL:  no joint aches, no muscle pain  SKIN:  No change in skin, hair or nails.  NEUROLOGIC:  No Headaches, seizures or weakness.  PSYCHIATRIC:  No disorder of thought or mood.  ENDOCRINE:  No heat or cold intolerance  HEMATOLOGICAL:  No easy bruising or bleeding.    =======================================================  Allergies  Plavix (Hives)  Toprol-XL (Rash)        ======================================================  Physical Exam:  ============     General:  No acute distress. frail  Eye: Pupils are equal, round and reactive to light, Extraocular movements are intact, Normal conjunctiva.  HENT: Normocephalic, Oral mucosa is dry, EDENTULOUS  Neck: Supple, No lymphadenopathy.  Respiratory: Lungs with decreased air entry at bases  RIGHT side chest tube  Cardiovascular: Normal rate, Regular rhythm,   RIGHT sided HD catheter.   Gastrointestinal: Soft, Non-tender, Non-distended, Normal bowel sounds.  Genitourinary: No costovertebral angle tenderness.  Lymphatics: No lymphadenopathy neck,   Musculoskeletal:  LEFT BKA  Integumentary: No rash.  Neurologic: Alert, Oriented, No focal deficits, Cranial Nerves II-XII are grossly intact.  Psychiatric: Appropriate mood & affect.    =======================================================  Vitals:  ============  T(F): 98.5 (20 Jun 2022 08:45), Max: 100.6 (19 Jun 2022 17:00)  HR: 62 (20 Jun 2022 12:29)  BP: 112/45 (20 Jun 2022 12:29)  RR: 18 (20 Jun 2022 09:31)  SpO2: 100% (20 Jun 2022 09:31) (100% - 100%)  temp max in last 48H T(F): , Max: 102 (06-19-22 @ 13:25)    =======================================================  Current Antibiotics:  piperacillin/tazobactam IVPB.. 3.375 Gram(s) IV Intermittent every 12 hours    Other medications:  aspirin  chewable 81 milliGRAM(s) Oral daily  atorvastatin 80 milliGRAM(s) Oral at bedtime  chlorhexidine 2% Cloths 1 Application(s) Topical daily  fentaNYL   Patch  12 MICROgram(s)/Hr 1 Patch Transdermal every 72 hours  heparin   Injectable 5000 Unit(s) SubCutaneous every 12 hours  isosorbide   mononitrate ER Tablet (IMDUR) 30 milliGRAM(s) Oral daily  lidocaine 1% (Preservative-free) Injectable 20 milliLiter(s) Local Injection once  melatonin 3 milliGRAM(s) Oral at bedtime  midodrine. 10 milliGRAM(s) Oral three times a day  multivitamin 1 Tablet(s) Oral daily  Nephro-pito 1 Tablet(s) Oral at bedtime  pantoprazole    Tablet 40 milliGRAM(s) Oral before breakfast  QUEtiapine 25 milliGRAM(s) Oral at bedtime  sevelamer carbonate 800 milliGRAM(s) Oral every 8 hours  tamsulosin 0.4 milliGRAM(s) Oral at bedtime  traZODone 50 milliGRAM(s) Oral at bedtime      =======================================================  Labs:                        9.6    5.47  )-----------( 313      ( 20 Jun 2022 07:07 )             32.5     06-20    138  |  95<L>  |  36.6<H>  ----------------------------<  110<H>  4.4   |  27.0  |  4.55<H>    Ca    8.6      20 Jun 2022 07:07  Phos  5.6     06-19  Mg     2.0     06-19    TPro  6.1<L>  /  Alb  2.8<L>  /  TBili  0.5  /  DBili  x   /  AST  20  /  ALT  13  /  AlkPhos  243<H>  06-20      Culture - Fungal, Body Fluid (collected 06-19-22 @ 18:21)  Source: .Body Fluid Pleural Fluid    Culture - Body Fluid with Gram Stain (collected 06-19-22 @ 18:21)  Source: .Body Fluid Pleural Fluid  Gram Stain (06-19-22 @ 23:08):    polymorphonuclear leukocytes seen    No organisms seen    by cytocentrifuge    Culture - Blood (collected 06-19-22 @ 03:11)  Source: .Blood Blood-Peripheral    Culture - Blood (collected 06-19-22 @ 03:08)  Source: .Blood Blood-Peripheral    Culture - Blood (collected 06-14-22 @ 05:44)  Source: .Blood Blood  Final Report (06-19-22 @ 06:00):    No Growth Final    Culture - Blood (collected 06-14-22 @ 05:44)  Source: .Blood Blood  Final Report (06-19-22 @ 06:00):    No Growth Final      Procalcitonin, Serum: 1.37 ng/mL (06-19-22 @ 07:37)    SARS-CoV-2: NotDetec (06-18-22 @ 17:24)  COVID-19 PCR: NotDetec (06-13-22 @ 06:58)  COVID-19 PCR: NotDetec (06-03-22 @ 18:58)  COVID-19 PCR: NotDetec (05-31-22 @ 04:59)      =======================================================         < from: CT Chest No Cont (06.18.22 @ 20:58) >    ACC: [id number] EXAM:  CT CHEST                          PROCEDURE DATE:  06/18/2022          INTERPRETATION:  CT CHEST WITH CONTRAST    INDICATION: And shortness of breath. Weakness.    TECHNIQUE: Unenhanced helical images were obtained of the chest. Coronal   and sagittal images were reconstructed. Maximum intensity projection   images were generated.    COMPARISON: Radiograph 6/18/2022. CT chest 6/6/2021 and 9/16/2020.    FINDINGS:    Tubes/Lines: Catheter via right-sided approach with its tipin the SVC.    Lungs, airways and pleura: Bilateral pleural effusions (right greater   than left).    Complete atelectasis of the right lower lobe. Passive atelectasis in the   right middle lobe and left lower lobe.    There is bilateral septal thickening and groundglass opacities. In   addition, there are bilateral 3 mm pulmonary nodules, some which have a   branching distribution.    The airways are unremarkable.    Mediastinum: The thyroid gland is unremarkable. The chest lymph nodes   measureless than 10 mm the short axis. The esophagus is unremarkable.    The heart is enlarged. Coronary artery calcifications. Mitral annular   calcification. Status post TAVR.    Upper Abdomen: Cholelithiasis. Upper abdominal ascites. Partially imaged   hypodense left renal lesions likely represent cysts.    Bones And Soft Tissues: Loop recorder.  The soft tissues are   unremarkable. Spondylosis of the thoracic spine.      IMPRESSION:    1.  Bilateral pleural effusions (right greater than left).  2.  Complete passive atelectasis of the right lower lobe.  3.  Bilateral linear atelectasis.  4.  Groundglass opacities and septal thickening are of uncertain etiology.  5.  Bilateral pulmonary nodules, some which have a branching morphology   are uncertain etiology. A follow-up is recommended in 4-6 weeks to   evaluate for resolution/change    --- End of Report ---            NEREIDA RUSSO MD; Attending Radiologist  This document has been electronically signed. Jun 19 2022 11:21AM    < end of copied text >

## 2022-06-20 NOTE — CONSULT NOTE ADULT - TIME BILLING
chart review, lab review, imaging review, notes review. Discussion with  and coordination of care with all relevant providers and consultants caring for patient. discussion with infectious disease Dr. Oliver. Discussed with Dr. elizabeth Davenport, and Dr. Goldstein

## 2022-06-20 NOTE — SWALLOW BEDSIDE ASSESSMENT ADULT - COMMENTS
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 Patient brought back to [facility] ER for recurrent fevers despite abx with Augmentin and delirium. CXR from prior hospitalization with blunted costophrenic angles. Repeat CXR unchanged. COVID/RVP Negative. CT Chest WO done shows moderate-large pleural effusion on my review awaiting official read by Radiology. Pt evaluated by Vascular Surgery for L AKA in ER. CT Surgery consulted for effusion possible parapneumonic effusion. ID consulted. Discussed plan of care with daughter and grandson at bedside. DNR/DNI confirmed but want all other medical and surgical interventions. Agreed to reconsult Palliative Care. Family also requesting Cardiology Consult for PPM interrogation while inpt. ROS +Delirium +Insomnia (no sleep x48 hours) +Fevers. ROS negative unless mentioned.

## 2022-06-20 NOTE — SWALLOW BEDSIDE ASSESSMENT ADULT - PHARYNGEAL PHASE
Within functional limits Pt noted, "I choke on water"/Wet vocal quality post oral intake/Delayed cough post oral intake

## 2022-06-21 NOTE — PROGRESS NOTE ADULT - ASSESSMENT
1) ESRD on HD  2) MBD of renal dx  3) Anemia of renal dx  4) Vol HTN    -Seen on HD, tolerating thus far, min UF  -Consent in chart  -Orders in place

## 2022-06-21 NOTE — PROGRESS NOTE ADULT - SUBJECTIVE AND OBJECTIVE BOX
[facility] - [facility], [facility]                                   [address] [place] [address]                                                      PHONE: [phone]                                                         FAX: [phone]  [email or web address]  ---------------------------------------------------------------------------------------------------------------------------------    Overnight events/patient complaints: patient seen at bedside. he feels "lousy."      Plavix (Hives)  Toprol-XL (Rash)    MEDICATIONS  (STANDING):  aspirin  chewable 81 milliGRAM(s) Oral daily  atorvastatin 80 milliGRAM(s) Oral at bedtime  chlorhexidine 2% Cloths 1 Application(s) Topical daily  fentaNYL   Patch  12 MICROgram(s)/Hr 1 Patch Transdermal every 72 hours  heparin   Injectable 5000 Unit(s) SubCutaneous every 12 hours  isosorbide   mononitrate ER Tablet (IMDUR) 30 milliGRAM(s) Oral daily  lidocaine 1% (Preservative-free) Injectable 20 milliLiter(s) Local Injection once  melatonin 3 milliGRAM(s) Oral at bedtime  midodrine. 10 milliGRAM(s) Oral three times a day  multivitamin 1 Tablet(s) Oral daily  Nephro-pito 1 Tablet(s) Oral at bedtime  pantoprazole    Tablet 40 milliGRAM(s) Oral before breakfast  piperacillin/tazobactam IVPB.. 3.375 Gram(s) IV Intermittent every 12 hours  QUEtiapine 25 milliGRAM(s) Oral at bedtime  sevelamer carbonate 800 milliGRAM(s) Oral every 8 hours  tamsulosin 0.4 milliGRAM(s) Oral at bedtime  traZODone 50 milliGRAM(s) Oral at bedtime    MEDICATIONS  (PRN):  acetaminophen     Tablet .. 650 milliGRAM(s) Oral every 6 hours PRN Temp greater or equal to 38C (100.4F), Mild Pain (1 - 3)  melatonin 3 milliGRAM(s) Oral at bedtime PRN Insomnia  ondansetron Injectable 4 milliGRAM(s) IV Push every 8 hours PRN Nausea and/or Vomiting      Vital Signs Last 24 Hrs  T(C): 37.2 (21 Jun 2022 07:54), Max: 37.6 (20 Jun 2022 15:00)  T(F): 99 (21 Jun 2022 07:54), Max: 99.6 (20 Jun 2022 15:00)  HR: 63 (21 Jun 2022 07:54) (62 - 69)  BP: 131/55 (21 Jun 2022 07:54) (101/46 - 131/55)  BP(mean): --  RR: 18 (21 Jun 2022 07:54) (18 - 18)  SpO2: 99% (21 Jun 2022 07:54) (98% - 99%)  ICU Vital Signs Last 24 Hrs  CHRISTIANO ELIZABETH  I&O's Detail    20 Jun 2022 07:01  -  21 Jun 2022 07:00  --------------------------------------------------------  IN:    Oral Fluid: 50 mL  Total IN: 50 mL    OUT:    Chest Tube (mL): 460 mL    Voided (mL): 1 mL  Total OUT: 461 mL    Total NET: -411 mL        Drug Dosing Weight  CHRISTIANO ELIZABETH      PHYSICAL EXAM:  General: NAD, pale  HEENT: Head; normocephalic, atraumatic.  Eyes: Pupils reactive, cornea wnl.  Neck: Supple, no nodes adenopathy, no NVD or carotid bruit or thyromegaly.  CARDIOVASCULAR: Normal S1 and S2, No murmur, rub, gallop or lift.   LUNGS: reduced breath sounds b/l base  ABDOMEN: Soft, nontender without mass or organomegaly. bowel sounds normoactive.  EXTREMITIES: No clubbing, cyanosis or edema. Distal pulses wnl.   SKIN: warm and dry with normal turgor.  NEURO: Alert/oriented x 3  PSYCH: normal affect.        LABS:                        9.5    6.47  )-----------( 311      ( 21 Jun 2022 07:59 )             30.8     06-21    138  |  94<L>  |  46.4<H>  ----------------------------<  92  4.5   |  25.0  |  5.38<H>    Ca    9.0      21 Jun 2022 07:59    TPro  6.1<L>  /  Alb  2.8<L>  /  TBili  0.5  /  DBili  x   /  AST  20  /  ALT  13  /  AlkPhos  243<H>  06-20    CHRISTIANO ELIZABEHT            RADIOLOGY & ADDITIONAL STUDIES:    INTERPRETATION OF TELEMETRY (personally reviewed): V paced       ASSESSMENT AND PLAN:  89 yo M with ESRD on HD, GIB 2/2 AVM, CAD, HTN, HLD, PPM, CHFpEF, PAD s/p L Fem Bypass with cryovein c/b acute graft rethrombosis s/p thrombectomy now s/p L AKA complicated by hemorrhagic Shock s/p L AKA brought back to [facility] on 6/19/22 for recurrent fevers despite abx with Augmentin and delirium.     - PPM interrogated yesterday 6/20/22 with normal function and chronic V pacing. No arrhythmia noted.   - continue antibiotics per ID recommendations  - HD per nephrology

## 2022-06-21 NOTE — PROGRESS NOTE ADULT - ASSESSMENT
88M with PMHX ESRD/HD (Ilamathi), Hx GIB 2/2 AVM, CAD, HTN, HLD, PPM, CHFpEF, PAD s/p L Fem Bypass with cryovein c/b acute graft rethrombosis s/p thrombectomy now s/p L AKA discharged 2 days ago from Vascular Service after SICU admission for Hemorrhagic Shock s/p L AKA on Augmentin for "FUO" discharged two days admitted for Recurrent Fevers and AMS r/o Pleural Effusion/Parapneumonic Effusions.    #Sepsis secondary to PNA c/b Parapneumonic Effusions   Oxygen via NC  CXR +blunted costophrenic angles  CT Chest with b/l pleural effusions. - Needs repeat SCan in 4-6 weeks for b/l pulmonary nodules Discontinue Augmentin  C/w Zosyn  Vancomycin 1g IV x1  COVID/RVP Negative  Repeat BCX pending  MRSA PCR Neg  Legionella/Strep PNA pending  CT Surgery recs appreciated - S/p Chest tube placement   Vascular Sx recs appreciated  ID recs appreciated     #PAD s/p L Fem Bypass with cryovein c/b acute graft rethrombosis s/p thrombectomy now s/p L AKA  Fentanyl Patch  Vascular Surgery recs appreciated    #Hx LLE DVT, Hx AFIB  Hemorrhagic Shock last admission on Eliquis  AC Held during last hospitalization    #ESRD/HD   Torsemide 20mg PO q24  Renal recs appreciated  Nephrovite Daily  Renal Diet + Sevelamer TID ACHS  unalbe to get HD on 6/20 for hypotension  Will monitor for now     #Hypotension/Bradycardia   /43 on admission.   250cc IVFB NSS given in ED  Hold Nifedipine 90mg qAM  Cardio recs appreciated  Started on midodrine    #HTN, HLD, CAD, CHFpEF  Torsemide 20mg PO q24  Hold Nifedipine 90mg PO q24  ASA 81mg PO q24  Atorvastatin 80mg PO q24  Imdur 30mg ER q24    #Delirium  Melatonin 3mg PO q24  Trazodone 50mg PO q24  Seroquel 25mg PO qHS      #Constipation  Hold Miralax and Senna given antibiotic-induced loose stool reported    Goals of Care  -DNR/DNI Confirmed. See MOLST Prior. Family wants all other medical and surgical options apart from CPR/Mechanical Ventilation. Palliative Consult for continuity.      Dispo: Pending course    Spoke to patients daughter Vanessa in person

## 2022-06-21 NOTE — PROGRESS NOTE ADULT - SUBJECTIVE AND OBJECTIVE BOX
Hospitalist Daily Progress Note    Chief Complaint:  Patient is a 88y old  Male who presents with a chief complaint of Recurrent Fevers (21 Jun 2022 12:17)      SUBJECTIVE / OVERNIGHT EVENTS:  Patient was seen and examined at bedside. takes oxygen off at times. no complaints.   Patient denies chest pain, SOB, abd pain, N/V, fever, chills, dysuria or any other complaints. All remainder ROS negative.     MEDICATIONS  (STANDING):  aspirin  chewable 81 milliGRAM(s) Oral daily  atorvastatin 80 milliGRAM(s) Oral at bedtime  chlorhexidine 2% Cloths 1 Application(s) Topical daily  fentaNYL   Patch  12 MICROgram(s)/Hr 1 Patch Transdermal every 72 hours  heparin   Injectable 5000 Unit(s) SubCutaneous every 12 hours  isosorbide   mononitrate ER Tablet (IMDUR) 30 milliGRAM(s) Oral daily  lidocaine 1% (Preservative-free) Injectable 20 milliLiter(s) Local Injection once  melatonin 3 milliGRAM(s) Oral at bedtime  midodrine. 10 milliGRAM(s) Oral three times a day  multivitamin 1 Tablet(s) Oral daily  Nephro-pito 1 Tablet(s) Oral at bedtime  pantoprazole    Tablet 40 milliGRAM(s) Oral before breakfast  piperacillin/tazobactam IVPB.. 3.375 Gram(s) IV Intermittent every 12 hours  QUEtiapine 25 milliGRAM(s) Oral at bedtime  sevelamer carbonate 800 milliGRAM(s) Oral every 8 hours  tamsulosin 0.4 milliGRAM(s) Oral at bedtime  traZODone 50 milliGRAM(s) Oral at bedtime    MEDICATIONS  (PRN):  acetaminophen     Tablet .. 650 milliGRAM(s) Oral every 6 hours PRN Temp greater or equal to 38C (100.4F), Mild Pain (1 - 3)  melatonin 3 milliGRAM(s) Oral at bedtime PRN Insomnia  ondansetron Injectable 4 milliGRAM(s) IV Push every 8 hours PRN Nausea and/or Vomiting        I&O's Summary    20 Jun 2022 07:01  -  21 Jun 2022 07:00  --------------------------------------------------------  IN: 50 mL / OUT: 461 mL / NET: -411 mL        PHYSICAL EXAM:  Vital Signs Last 24 Hrs  T(C): 36.9 (21 Jun 2022 12:10), Max: 37.6 (20 Jun 2022 15:00)  T(F): 98.4 (21 Jun 2022 12:10), Max: 99.6 (20 Jun 2022 15:00)  HR: 52 (21 Jun 2022 12:10) (52 - 69)  BP: 129/74 (21 Jun 2022 12:10) (101/46 - 131/55)  BP(mean): --  RR: 18 (21 Jun 2022 12:10) (18 - 18)  SpO2: 100% (21 Jun 2022 12:10) (98% - 100%)      Constitutional: NAD, Resting  ENT: Supple, No JVD  Lungs: CTA B/L, Non-labored breathing, right sided pigtail in place  Cardio: RRR, S1/S2, No murmur  Abdomen: Soft, Nontender, Nondistended; Bowel sounds present  Extremities: No calf tenderness, L aka noted  Musculoskeletal:   No clubbing or cyanosis of digits; no joint swelling or tenderness to palpation  Psych: Calm, cooperative affect appropriate  Neuro: Awake and alert, oriented  Skin: No rashes; no palpable lesions    LABS:                        9.5    6.47  )-----------( 311      ( 21 Jun 2022 07:59 )             30.8     06-21    138  |  94<L>  |  46.4<H>  ----------------------------<  92  4.5   |  25.0  |  5.38<H>    Ca    9.0      21 Jun 2022 07:59    TPro  6.1<L>  /  Alb  2.8<L>  /  TBili  0.5  /  DBili  x   /  AST  20  /  ALT  13  /  AlkPhos  243<H>  06-20              Culture - Fungal, Body Fluid (collected 19 Jun 2022 18:21)  Source: .Body Fluid Pleural Fluid  Preliminary Report (20 Jun 2022 11:16):    Testing in progress    Culture - Body Fluid with Gram Stain (collected 19 Jun 2022 18:21)  Source: .Body Fluid Pleural Fluid  Gram Stain (19 Jun 2022 23:08):    polymorphonuclear leukocytes seen    No organisms seen    by cytocentrifuge  Preliminary Report (20 Jun 2022 17:34):    No growth    Culture - Blood (collected 19 Jun 2022 03:11)  Source: .Blood Blood-Peripheral  Preliminary Report (20 Jun 2022 04:01):    No growth to date.    Culture - Blood (collected 19 Jun 2022 03:08)  Source: .Blood Blood-Peripheral  Preliminary Report (20 Jun 2022 04:01):    No growth to date.      CAPILLARY BLOOD GLUCOSE            RADIOLOGY REVIEWED

## 2022-06-21 NOTE — PROGRESS NOTE ADULT - SUBJECTIVE AND OBJECTIVE BOX
Subjective: Pt  lying in bed.  Eyes closed.  NAD noted.                         T(F): 98.4 (22 @ 12:10), Max: 99.6 (22 @ 15:00)  HR: 52 (22 @ 12:10) (52 - 69)  BP: 129/74 (22 @ 12:10) (101/46 - 131/55)  RR: 18 (22 @ 12:10) (18 - 18)  SpO2: 100% (22 @ 12:10) (98% - 100%)    Daily     Daily Weight in k (2022 12:10)    Allergies:  Plavix (Hives)  Toprol-XL (Rash)          138  |  94<L>  |  46.4<H>  ----------------------------<  92  4.5   |  25.0  |  5.38<H>    Ca    9.0      2022 07:59    TPro  6.1<L>  /  Alb  2.8<L>  /  TBili  0.5  /  DBili  x   /  AST  20  /  ALT  13  /  AlkPhos  243<H>                                 9.5    6.47  )-----------( 311      ( 2022 07:59 )             30.8             CXR: < from: Xray Chest 1 View- PORTABLE-Routine (Xray Chest 1 View- PORTABLE-Routine in AM.) (22 @ 05:07) >  IMPRESSION:  Right chest tube in place with no pneumothorax, unchanged    --- End of Report ---    SHAILESH NARVAEZ DO; Attending Radiologist  This document has been electronically signed. 2022  3:54PM    < end of copied text >      I&O's Detail    2022 07:01  -  2022 07:00  --------------------------------------------------------  IN:    Oral Fluid: 50 mL  Total IN: 50 mL    OUT:    Chest Tube (mL): 460 mL    Voided (mL): 1 mL  Total OUT: 461 mL    Total NET: -411 mL      CHEST TUBE:  [ x] YES [ ] NO  OUTPUT: 50ml overnight and 460ml over the last 24 hours    AIR LEAKS:  [ ] YES [ x] NO        Active Medications:  acetaminophen     Tablet .. 650 milliGRAM(s) Oral every 6 hours PRN  aspirin  chewable 81 milliGRAM(s) Oral daily  atorvastatin 80 milliGRAM(s) Oral at bedtime  chlorhexidine 2% Cloths 1 Application(s) Topical daily  fentaNYL   Patch  12 MICROgram(s)/Hr 1 Patch Transdermal every 72 hours  heparin   Injectable 5000 Unit(s) SubCutaneous every 12 hours  isosorbide   mononitrate ER Tablet (IMDUR) 30 milliGRAM(s) Oral daily  lidocaine 1% (Preservative-free) Injectable 20 milliLiter(s) Local Injection once  melatonin 3 milliGRAM(s) Oral at bedtime  melatonin 3 milliGRAM(s) Oral at bedtime PRN  midodrine. 10 milliGRAM(s) Oral three times a day  multivitamin 1 Tablet(s) Oral daily  Nephro-pito 1 Tablet(s) Oral at bedtime  ondansetron Injectable 4 milliGRAM(s) IV Push every 8 hours PRN  pantoprazole    Tablet 40 milliGRAM(s) Oral before breakfast  piperacillin/tazobactam IVPB.. 3.375 Gram(s) IV Intermittent every 12 hours  QUEtiapine 25 milliGRAM(s) Oral at bedtime  sevelamer carbonate 800 milliGRAM(s) Oral every 8 hours  tamsulosin 0.4 milliGRAM(s) Oral at bedtime  traZODone 50 milliGRAM(s) Oral at bedtime      Physical Exam:  Neuro: sleeping but arousable.  non-focal, MARQUEZ  Cardiac: S1S2, no murmurs  Pulm: coarse breath sounds b/l, no wheezing or rales.  Right pigtail to waterseal.  No airleak or crepitus.  Abdomen: Soft, NT, ND, hypoactive BS  Peripheral: +DP pulses RLE, Left AKA noted. no peripheral edema      PAST MEDICAL & SURGICAL HISTORY:  DM (diabetes mellitus)  - resolved      AV block, 1st degree      Arrhythmia      CAD (coronary artery disease)      HTN (hypertension)      VT (ventricular tachycardia)      RICHARDS (dyspnea on exertion)      Anemia      Risk factors for obstructive sleep apnea      ESRD on dialysis  HD on  and       Aortic stenosis  - s/p valve replacement      GI bleeding  due to ulcerated polyps and colonic AVMs      H/O circumcision  at  age  65      H/O left knee surgery      H/O angioplasty  ,  no  intervention      A-V fistula  left arm 2017      H/O carotid endarterectomy  Right      S/P TAVR (transcatheter aortic valve replacement)      History of loop recorder

## 2022-06-21 NOTE — PROGRESS NOTE ADULT - PROBLEM SELECTOR PLAN 1
Right pigtail catheter placed 6/19   Maintain RIGHT pigtail to water seal  Daily CXR while chest tube is in place.  Light's criteria - Transudative   Pleural Fluid: Gram stain NGTD, fungal pending  Pleural fluid cyto pending   Discussed with Dr. Reynolds.

## 2022-06-21 NOTE — PROGRESS NOTE ADULT - ASSESSMENT
This 88 y.o. M with PMHX ESRD/HD on Mondays and Fridays ([facility]), Hx GIB 2/2 AVM, CAD, HTN, HLD, PPM, CHFpEF, PAD s/p L Fem Bypass with cryovein c/b acute graft rethrombosis s/p thrombectomy now s/p L AKA discharged 2 days ago from Vascular Service after SICU admission for Hemorrhagic Shock s/p L AKA on Augmentin for "FUO". Patient brought back to [facility] ER for recurrent fevers despite abx with Augmentin and delirium. CXR from prior hospitalization with blunted costophrenic angles.   COVID/RVP Negative . Pt evaluated by Vascular Surgery for L AKA in ER. CT Surgery consulted for effusion possible parapneumonic effusion. ID consulted. Discussed plan of care with daughter and grandson at bedside. DNR/DNI confirmed but want all other medical and surgical interventions.  (18 Jun 2022 23:40)    daughter at bedside.   recently discharged 6/16/22 per daughter, had low grade fever at that time.   at home progressively decline.  EMS was called, evaluated the patient, and brought to hospital.     fevers noted here.   CT scan showed:  1.  Bilateral pleural effusions (right greater than left).  2.  Complete passive atelectasis of the right lower lobe.  3.  Bilateral linear atelectasis.  4.  Groundglass opacities and septal thickening are of uncertain etiology.  5.  Bilateral pulmonary nodules, some which have a branching morphology   are uncertain etiology. A follow-up is recommended in 4-6 weeks to   evaluate for resolution/change    patient started on empiric antibiotics with zosyn.     Impression  fevers  weakness  pleural effusions  ESRD on HD      Plan:  workup in progress.    cultures from pleural fluid, so far is negative  - follow up all outstanding cultures  - trend temperature and WBC curve  - repeat cultures from blood and all sources if febrile.    patient remains stable;     Continue zosyn 3.375 grams Q 12H    s/p drainage of pleural effusion 6/19/22  - follow up on culture      ESRD on HD  - renal following

## 2022-06-21 NOTE — PROGRESS NOTE ADULT - SUBJECTIVE AND OBJECTIVE BOX
[facility]                                                INFECTIOUS DISEASES  =======================================================                               Jeet Bryan MD#  Mango Oliver MD*                                     Andrew Kaba MD*    Radha Short MD*            Diplomates American Board of Internal Medicine & Infectious Diseases                  # [place] Office - Appt - Tel  [phone] Fax [phone]                * [place] Office - Appt - Tel [phone] Fax [phone]                                  Hospital Consult line:  [phone]  =======================================================    [facility]-[id number]  CHRISTIANO GLENN   follow up: weakness, SOB    s/p interval placement of a right chest tube.   currently culture remain negative.          I have personally reviewed the labs and data; pertinent labs and data are listed in this note; please see below.   =======================================================  Past Medical & Surgical Hx:  =====================  PAST MEDICAL & SURGICAL HISTORY:  DM (diabetes mellitus) - resolved  AV block, 1st degree  Arrhythmia  CAD (coronary artery disease)  HTN (hypertension)  VT (ventricular tachycardia)  RICHARDS (dyspnea on exertion)  Anemia  Risk factors for obstructive sleep apnea  ESRD on dialysis HD on Mondays and Fridays  Aortic stenosis - s/p valve replacement  GI bleeding due to ulcerated polyps and colonic AVMs  H/O circumcision at  age  65  H/O left knee surgery  H/O angioplasty 2013,  no  intervention  A-V fistula left arm 5/2017  H/O carotid endarterectomy Right  S/P TAVR (transcatheter aortic valve replacement)  History of loop recorder    Problem List:  ==========  HEALTH ISSUES - PROBLEM Dx:  Pleural effusion    Social Hx:  =======  no toxic habits currently    FAMILY HISTORY:  Family history of cancer (Grandparent)  Family history of lung cancer (Grandparent)  Family history of premature CAD  FH: type 2 diabetes    no significant family history of immunosuppressive disorders in mother or father   =======================================================    REVIEW OF SYSTEMS:  CONSTITUTIONAL:  + WEAKNESS  HEENT:  No diplopia or blurred vision.  No earache, sore throat or runny nose.  CARDIOVASCULAR:  No pressure, squeezing, strangling, tightness, heaviness or aching about the chest, neck, axilla or epigastrium.  RESPIRATORY:  No cough, shortness of breath  GASTROINTESTINAL:  No nausea, vomiting or diarrhea.  GENITOURINARY:  No dysuria, frequency or urgency. No Blood in urine  MUSCULOSKELETAL:  no joint aches, no muscle pain  SKIN:  No change in skin, hair or nails.  NEUROLOGIC:  No Headaches, seizures or weakness.  PSYCHIATRIC:  No disorder of thought or mood.  ENDOCRINE:  No heat or cold intolerance  HEMATOLOGICAL:  No easy bruising or bleeding.    =======================================================  Allergies  Plavix (Hives)  Toprol-XL (Rash)      ======================================================  Physical Exam:  ============     General:  No acute distress. frail  Eye: Pupils are equal, round and reactive to light, Extraocular movements are intact, Normal conjunctiva.  HENT: Normocephalic, Oral mucosa is dry, EDENTULOUS  Neck: Supple, No lymphadenopathy.  Respiratory: Lungs with decreased air entry at bases  RIGHT side chest tube, with lady color fluid.   Cardiovascular: Normal rate, Regular rhythm,  Left chest wall implanted cardiac device  RIGHT sided HD catheter.   Gastrointestinal: Soft, Non-tender, Non-distended, Normal bowel sounds.  Genitourinary: No costovertebral angle tenderness.  Lymphatics: No lymphadenopathy neck,   Musculoskeletal:  LEFT BKA  Integumentary: No rash.  Neurologic: Alert, Oriented, No focal deficits, Cranial Nerves II-XII are grossly intact.  Psychiatric: Appropriate mood & affect.    =======================================================  Vitals:  ============  T(F): 98.4 (21 Jun 2022 12:10), Max: 99.6 (20 Jun 2022 15:00)  HR: 52 (21 Jun 2022 12:10)  BP: 129/74 (21 Jun 2022 12:10)  RR: 18 (21 Jun 2022 12:10)  SpO2: 100% (21 Jun 2022 12:10) (98% - 100%)  temp max in last 48H T(F): , Max: 100.6 (06-19-22 @ 17:00)    =======================================================  Current Antibiotics:  piperacillin/tazobactam IVPB.. 3.375 Gram(s) IV Intermittent every 12 hours    Other medications:  aspirin  chewable 81 milliGRAM(s) Oral daily  atorvastatin 80 milliGRAM(s) Oral at bedtime  chlorhexidine 2% Cloths 1 Application(s) Topical daily  fentaNYL   Patch  12 MICROgram(s)/Hr 1 Patch Transdermal every 72 hours  heparin   Injectable 5000 Unit(s) SubCutaneous every 12 hours  isosorbide   mononitrate ER Tablet (IMDUR) 30 milliGRAM(s) Oral daily  lidocaine 1% (Preservative-free) Injectable 20 milliLiter(s) Local Injection once  melatonin 3 milliGRAM(s) Oral at bedtime  midodrine. 10 milliGRAM(s) Oral three times a day  multivitamin 1 Tablet(s) Oral daily  Nephro-pito 1 Tablet(s) Oral at bedtime  pantoprazole    Tablet 40 milliGRAM(s) Oral before breakfast  QUEtiapine 25 milliGRAM(s) Oral at bedtime  sevelamer carbonate 800 milliGRAM(s) Oral every 8 hours  tamsulosin 0.4 milliGRAM(s) Oral at bedtime  traZODone 50 milliGRAM(s) Oral at bedtime      =======================================================  Labs:                        9.5    6.47  )-----------( 311      ( 21 Jun 2022 07:59 )             30.8     06-21    138  |  94<L>  |  46.4<H>  ----------------------------<  92  4.5   |  25.0  |  5.38<H>    Ca    9.0      21 Jun 2022 07:59    TPro  6.1<L>  /  Alb  2.8<L>  /  TBili  0.5  /  DBili  x   /  AST  20  /  ALT  13  /  AlkPhos  243<H>  06-20      Culture - Fungal, Body Fluid (collected 06-19-22 @ 18:21)  Source: .Body Fluid Pleural Fluid    Culture - Body Fluid with Gram Stain (collected 06-19-22 @ 18:21)  Source: .Body Fluid Pleural Fluid  Gram Stain (06-19-22 @ 23:08):    polymorphonuclear leukocytes seen    No organisms seen    by cytocentrifuge    Culture - Blood (collected 06-19-22 @ 03:11)  Source: .Blood Blood-Peripheral    Culture - Blood (collected 06-19-22 @ 03:08)  Source: .Blood Blood-Peripheral    Culture - Blood (collected 06-14-22 @ 05:44)  Source: .Blood Blood  Final Report (06-19-22 @ 06:00):    No Growth Final    Culture - Blood (collected 06-14-22 @ 05:44)  Source: .Blood Blood  Final Report (06-19-22 @ 06:00):    No Growth Final         Procalcitonin, Serum: 1.37 ng/mL (06-19-22 @ 07:37)    SARS-CoV-2: NotDetec (06-18-22 @ 17:24)  COVID-19 PCR: NotDetec (06-13-22 @ 06:58)  COVID-19 PCR: NotDetec (06-03-22 @ 18:58)  COVID-19 PCR: NotDetec (05-31-22 @ 04:59)      =======================================================          < from: CT Chest No Cont (06.18.22 @ 20:58) >    ACC: [id number] EXAM:  CT CHEST                          PROCEDURE DATE:  06/18/2022          INTERPRETATION:  CT CHEST WITH CONTRAST    INDICATION: And shortness of breath. Weakness.    TECHNIQUE: Unenhanced helical images were obtained of the chest. Coronal and sagittal images were reconstructed. Maximum intensity projection images were generated.    COMPARISON: Radiograph 6/18/2022. CT chest 6/6/2021 and 9/16/2020.    FINDINGS:    Tubes/Lines: Catheter via right-sided approach with its tipin the SVC.    Lungs, airways and pleura: Bilateral pleural effusions (right greater   than left).    Complete atelectasis of the right lower lobe. Passive atelectasis in the   right middle lobe and left lower lobe.    There is bilateral septal thickening and groundglass opacities. In   addition, there are bilateral 3 mm pulmonary nodules, some which have a   branching distribution.    The airways are unremarkable.    Mediastinum: The thyroid gland is unremarkable. The chest lymph nodes   measureless than 10 mm the short axis. The esophagus is unremarkable.    The heart is enlarged. Coronary artery calcifications. Mitral annular   calcification. Status post TAVR.    Upper Abdomen: Cholelithiasis. Upper abdominal ascites. Partially imaged   hypodense left renal lesions likely represent cysts.    Bones And Soft Tissues: Loop recorder.  The soft tissues are   unremarkable. Spondylosis of the thoracic spine.      IMPRESSION:    1.  Bilateral pleural effusions (right greater than left).  2.  Complete passive atelectasis of the right lower lobe.  3.  Bilateral linear atelectasis.  4.  Groundglass opacities and septal thickening are of uncertain etiology.  5.  Bilateral pulmonary nodules, some which have a branching morphology   are uncertain etiology. A follow-up is recommended in 4-6 weeks to   evaluate for resolution/change    --- End of Report ---            NEREIDA RUSSO MD; Attending Radiologist  This document has been electronically signed. Jun 19 2022 11:21AM    < end of copied text >

## 2022-06-21 NOTE — PROGRESS NOTE ADULT - SUBJECTIVE AND OBJECTIVE BOX
Asked to interrogate pt's Medtronic Micra leadless pacemaker.   VDD 50bpm  Sensing: R = paced (CHB, no appreciable escape)  Impedance: 420 ohms  Threshold: 0.63mV@0.24ms  Pacing:   VS = 1.6%  AM- = 40.4%   only = 57.9%    - Atrial mechanical sensing noted to be suboptimal, which is likely the cause of sudden changes in heart rates.   - AM sensing window and threshold adjusted to accommodate small AM signal.   - AM sensing auto threshold turned off.   - Otherwise normal device function with appropriate safety margins for ventricular sensing and pacing.     Above d/w Dr. Palma and pt's daughter Vanessa.

## 2022-06-21 NOTE — PROGRESS NOTE ADULT - SUBJECTIVE AND OBJECTIVE BOX
[facility] DIVISION OF KIDNEY DISEASES AND HYPERTENSION -- INITIAL CONSULT NOTE  --------------------------------------------------------------------------------  HPI:  88M with PMHX ESRD/HD ([place]), Hx GIB 2/2 AVM, CAD, HTN, HLD, PPM, CHFpEF, PAD s/p L Fem Bypass with cryovein c/b acute graft rethrombosis s/p thrombectomy now s/p L AKA discharged 2 days ago from Vascular Service after SICU admission for Hemorrhagic Shock s/p L AKA on Augmentin for "FUO". Patient brought back to [facility] ER for recurrent fevers despite abx with Augmentin and delirium. CXR from prior hospitalization with blunted costophrenic angles. Repeat CXR unchanged. COVID/RVP Negative. CT Chest WO done shows moderate-large pleural effusion on my review awaiting official read by Radiology. Pt evaluated by Vascular Surgery for L AKA in ER. CT Surgery consulted for effusion possible parapneumonic effusion. ID consulted. Discussed plan of care with daughter and grandson at bedside. DNR/DNI confirmed but want all other medical and surgical interventions. Agreed to reconsult Palliative Care. Family also requesting Cardiology Consult for PPM interrogation while inpt. ROS +Delirium +Insomnia (no sleep x48 hours) +Fevers. ROS negative unless mentioned.   Pt seen/examined; due for HD in AM; daughter bedside; plan for pigtail catheter for effusion;    lethargic    PAST HISTORY  --------------------------------------------------------------------------------  PAST MEDICAL & SURGICAL HISTORY:  DM (diabetes mellitus)  - resolved      AV block, 1st degree      Arrhythmia      CAD (coronary artery disease)      HTN (hypertension)      VT (ventricular tachycardia)      RICHARDS (dyspnea on exertion)      Anemia      Risk factors for obstructive sleep apnea      ESRD on dialysis  HD on Mondays and Fridays      Aortic stenosis  - s/p valve replacement      GI bleeding  due to ulcerated polyps and colonic AVMs      H/O circumcision  at  age  65      H/O left knee surgery      H/O angioplasty  2013,  no  intervention      A-V fistula  left arm 5/2017      H/O carotid endarterectomy  Right      S/P TAVR (transcatheter aortic valve replacement)      History of loop recorder        FAMILY HISTORY:  Family history of cancer (Grandparent)    Family history of lung cancer (Grandparent)    Family history of premature CAD    FH: type 2 diabetes      PAST SOCIAL HISTORY:    ALLERGIES & MEDICATIONS  --------------------------------------------------------------------------------  Allergies    Plavix (Hives)  Toprol-XL (Rash)    Intolerances      Standing Inpatient Medications  aspirin  chewable 81 milliGRAM(s) Oral daily  atorvastatin 80 milliGRAM(s) Oral at bedtime  fentaNYL   Patch  12 MICROgram(s)/Hr 1 Patch Transdermal every 72 hours  heparin   Injectable 5000 Unit(s) SubCutaneous every 12 hours  isosorbide   mononitrate ER Tablet (IMDUR) 30 milliGRAM(s) Oral daily  lidocaine 1% (Preservative-free) Injectable 20 milliLiter(s) Local Injection once  melatonin 3 milliGRAM(s) Oral at bedtime  multivitamin 1 Tablet(s) Oral daily  Nephro-pito 1 Tablet(s) Oral at bedtime  pantoprazole    Tablet 40 milliGRAM(s) Oral before breakfast  piperacillin/tazobactam IVPB.. 3.375 Gram(s) IV Intermittent every 12 hours  QUEtiapine 25 milliGRAM(s) Oral at bedtime  sevelamer carbonate 800 milliGRAM(s) Oral every 8 hours  tamsulosin 0.4 milliGRAM(s) Oral at bedtime  torsemide 20 milliGRAM(s) Oral daily  traZODone 50 milliGRAM(s) Oral at bedtime    PRN Inpatient Medications  acetaminophen     Tablet .. 650 milliGRAM(s) Oral every 6 hours PRN  aluminum hydroxide/magnesium hydroxide/simethicone Suspension 30 milliLiter(s) Oral every 4 hours PRN  melatonin 3 milliGRAM(s) Oral at bedtime PRN  ondansetron Injectable 4 milliGRAM(s) IV Push every 8 hours PRN      REVIEW OF SYSTEMS  --------------------------------------------------------------------------------  Limited due to lethargy    VITALS/PHYSICAL EXAM  --------------------------------------------------------------------------------  ICU Vital Signs Last 24 Hrs  T(C): 36.9 (21 Jun 2022 12:10), Max: 37.6 (20 Jun 2022 15:00)  T(F): 98.4 (21 Jun 2022 12:10), Max: 99.6 (20 Jun 2022 15:00)  HR: 52 (21 Jun 2022 12:10) (52 - 69)  BP: 129/74 (21 Jun 2022 12:10) (101/46 - 131/55)  BP(mean): --  ABP: --  ABP(mean): --  RR: 18 (21 Jun 2022 12:10) (18 - 18)  SpO2: 100% (21 Jun 2022 12:10) (98% - 100%)      Physical Exam:  	Gen: NAD   	HEENT: supple neck  	Pulm: dec BS  	CV: RRR, S1S2; no rub  	Abd: +BS, soft, nontender/nondistended  	: No suprapubic tenderness  	UE: Warm, no clubbing, no edema; no asterixis  	LE: +LLE AKA C/D/I Bandage in place +RLE WNL  	Neuro: lethargic  	Skin: Warm, without rashes      LABS/STUDIES  --------------------------------------------------------------------------------                        9.5    6.47  )-----------( 311      ( 21 Jun 2022 07:59 )             30.8     06-21    138  |  94<L>  |  46.4<H>  ----------------------------<  92  4.5   |  25.0  |  5.38<H>    Ca    9.0      21 Jun 2022 07:59    TPro  6.1<L>  /  Alb  2.8<L>  /  TBili  0.5  /  DBili  x   /  AST  20  /  ALT  13  /  AlkPhos  243<H>  06-20

## 2022-06-22 NOTE — DISCHARGE NOTE NURSING/CASE MANAGEMENT/SOCIAL WORK - PATIENT PORTAL LINK FT
You can access the FollowMyHealth Patient Portal offered by [facility] by registering at the following website: [email or web address]. By joining [employer]’s FollowMyHealth portal, you will also be able to view your health information using other applications (apps) compatible with our system.

## 2022-06-22 NOTE — PHYSICAL THERAPY INITIAL EVALUATION ADULT - GENERAL OBSERVATIONS, REHAB EVAL
Patient received lying in bed, NAD, breathing 2LO2 via NC, +tele/. Pt agreeable to Physical Therapy evaluation.

## 2022-06-22 NOTE — DISCHARGE NOTE NURSING/CASE MANAGEMENT/SOCIAL WORK - NSDCFUADDAPPT_GEN_ALL_CORE_FT
AI patient.  You have a prescheduled appointment with your PCP, Dr. Bryan, on 7/6/22 at 4:45pm. Please call the office ([phone]) if you need to reschedule.

## 2022-06-22 NOTE — PHYSICAL THERAPY INITIAL EVALUATION ADULT - DIAGNOSIS, PT EVAL
Impaired functional mobility secondary to weakness, impaired balance/motor/postural control, recent AKA

## 2022-06-22 NOTE — PROGRESS NOTE ADULT - SUBJECTIVE AND OBJECTIVE BOX
Hospitalist Daily Progress Note    Chief Complaint:  Patient is a 88y old  Male who presents with a chief complaint of Recurrent Fevers (22 Jun 2022 07:02)      SUBJECTIVE / OVERNIGHT EVENTS:  Patient was seen and examined at bedside. s/p removal of chest tube. Drowsy. No active complaints.   Patient denies chest pain, SOB, abd pain, N/V, fever, chills, dysuria or any other complaints. All remainder ROS negative.     MEDICATIONS  (STANDING):  aspirin  chewable 81 milliGRAM(s) Oral daily  atorvastatin 80 milliGRAM(s) Oral at bedtime  chlorhexidine 2% Cloths 1 Application(s) Topical daily  fentaNYL   Patch  12 MICROgram(s)/Hr 1 Patch Transdermal every 72 hours  heparin   Injectable 5000 Unit(s) SubCutaneous every 12 hours  isosorbide   mononitrate ER Tablet (IMDUR) 30 milliGRAM(s) Oral daily  lidocaine   4% Patch 1 Patch Transdermal every 24 hours  lidocaine 1% (Preservative-free) Injectable 20 milliLiter(s) Local Injection once  multivitamin 1 Tablet(s) Oral daily  Nephro-pito 1 Tablet(s) Oral at bedtime  pantoprazole    Tablet 40 milliGRAM(s) Oral before breakfast  piperacillin/tazobactam IVPB.. 3.375 Gram(s) IV Intermittent every 12 hours  QUEtiapine 25 milliGRAM(s) Oral at bedtime  sevelamer carbonate 800 milliGRAM(s) Oral every 8 hours  tamsulosin 0.4 milliGRAM(s) Oral at bedtime  traZODone 50 milliGRAM(s) Oral at bedtime    MEDICATIONS  (PRN):  acetaminophen     Tablet .. 650 milliGRAM(s) Oral every 6 hours PRN Temp greater or equal to 38C (100.4F), Mild Pain (1 - 3)  ondansetron Injectable 4 milliGRAM(s) IV Push every 8 hours PRN Nausea and/or Vomiting        I&O's Summary    21 Jun 2022 07:01  -  22 Jun 2022 07:00  --------------------------------------------------------  IN: 320 mL / OUT: 20 mL / NET: 300 mL        PHYSICAL EXAM:  Vital Signs Last 24 Hrs  T(C): 37 (22 Jun 2022 09:00), Max: 37.6 (21 Jun 2022 21:12)  T(F): 98.6 (22 Jun 2022 09:00), Max: 99.6 (21 Jun 2022 21:12)  HR: 59 (22 Jun 2022 09:00) (52 - 65)  BP: 135/59 (22 Jun 2022 09:00) (119/87 - 142/59)  BP(mean): --  RR: 18 (22 Jun 2022 09:00) (18 - 18)  SpO2: 100% (22 Jun 2022 09:00) (98% - 100%)      Constitutional: NAD, Resting, chronically ill appearing male   ENT: Supple, No JVD  Lungs: CTA B/L, Non-labored breathing  Cardio: RRR, S1/S2, No murmur  Abdomen: Soft, Nontender, Nondistended; Bowel sounds present  Extremities: No calf tenderness, Left aka noted   Musculoskeletal:   No clubbing or cyanosis of digits; no joint swelling or tenderness to palpation  Psych: Calm, cooperative affect appropriate  Neuro: Awake and alert, oriented to name, location year  Skin: No rashes; no palpable lesions    LABS:                        10.1   5.22  )-----------( 290      ( 22 Jun 2022 07:02 )             33.6     06-22    141  |  97<L>  |  23.4<H>  ----------------------------<  95  4.2   |  28.0  |  3.16<H>    Ca    9.0      22 Jun 2022 07:02    TPro  6.3<L>  /  Alb  2.8<L>  /  TBili  0.5  /  DBili  x   /  AST  28  /  ALT  17  /  AlkPhos  281<H>  06-22              Culture - Fungal, Body Fluid (collected 19 Jun 2022 18:21)  Source: .Body Fluid Pleural Fluid  Preliminary Report (20 Jun 2022 11:16):    Testing in progress    Culture - Body Fluid with Gram Stain (collected 19 Jun 2022 18:21)  Source: .Body Fluid Pleural Fluid  Gram Stain (19 Jun 2022 23:08):    polymorphonuclear leukocytes seen    No organisms seen    by cytocentrifuge  Preliminary Report (20 Jun 2022 17:34):    No growth      CAPILLARY BLOOD GLUCOSE      POCT Blood Glucose.: 113 mg/dL (22 Jun 2022 07:39)  POCT Blood Glucose.: 75 mg/dL (22 Jun 2022 06:27)        RADIOLOGY REVIEWED

## 2022-06-22 NOTE — PROGRESS NOTE ADULT - SUBJECTIVE AND OBJECTIVE BOX
Subjective:    Vital Signs:  Vital Signs Last 24 Hrs  T(C): 36.8 (06-22-22 @ 06:06), Max: 37.6 (06-21-22 @ 21:12)  T(F): 98.2 (06-22-22 @ 06:06), Max: 99.6 (06-21-22 @ 21:12)  HR: 59 (06-22-22 @ 06:06) (52 - 65)  BP: 142/59 (06-22-22 @ 06:06) (119/87 - 142/59)  RR: 18 (06-22-22 @ 06:06) (18 - 18)  SpO2: 100% (06-22-22 @ 06:06) (98% - 100%) on (O2)    Relevant labs, radiology and Medications reviewed    Pertinent Physical Exam      06-21 @ 07:01  -  06-22 @ 07:00  --------------------------------------------------------  IN:    Oral Fluid: 320 mL  Total IN: 320 mL    OUT:    Other (mL): 0 mL  Total OUT: 0 mL    Total NET: 320 mL             Subjective: Patient not interacting with exam, frustrated he was woken up.  Unable to obtain appropriate HPI as patient is not participating.    Vital Signs:  Vital Signs Last 24 Hrs  T(C): 36.8 (06-22-22 @ 06:06), Max: 37.6 (06-21-22 @ 21:12)  T(F): 98.2 (06-22-22 @ 06:06), Max: 99.6 (06-21-22 @ 21:12)  HR: 59 (06-22-22 @ 06:06) (52 - 65)  BP: 142/59 (06-22-22 @ 06:06) (119/87 - 142/59)  RR: 18 (06-22-22 @ 06:06) (18 - 18)  SpO2: 100% (06-22-22 @ 06:06) (98% - 100%) on (O2)    Relevant labs, radiology and Medications reviewed    Pertinent Physical Exam  General: Well appearing, NAD  Neuro: AxO x1, non-focal, MARQUEZ  Cardiac: S1S2, no murmurs  Pulm: CTA b/l, no wheezing or rales  Abdomen: Soft, NT, ND  Peripheral: +DP pulses RLE, no peripheral edema     06-21 @ 07:01  -  06-22 @ 07:00  --------------------------------------------------------  IN:    Oral Fluid: 320 mL  Total IN: 320 mL    OUT:    Other (mL): 0 mL  Total OUT: 0 mL    Total NET: 320 mL

## 2022-06-22 NOTE — PROGRESS NOTE ADULT - ASSESSMENT
88M with  ESRD on HD, anemia, CHF with preserved EF, HTN, CAD, AFIB, PVD s/p recent bypass, LLE DVT, recently admitted with gastrointestinal bleeding, hemorrhagic shock related to graft bleeding s/p ligation of bleeding CryoVein fem-AT bypass, s/p left AKA, now here with recurrent fevers.     #1 Fevers  - infectious disease following  - COVID/RVP negative   - CT A/P - left pleural effusion s/p chest tube  - cultures pending  - on empiric zosyn     #2 Pleural effusion  - s/p chest tube, CT surgery following  - culture pending    #3 ESRD  - HD as able per nephrology   - nephrology following    #4 S/P L AKA, LE pain   - PAD, failed femoral tibial bypass  - graft thrombosis  - L stump with ace bandage dry and intact  - vascular following   - continue fentanyl patch     #5 LLE DVT, afib  - no AC due to multiple bleeding episodes     #6 Encounter for palliative care  - continuing to follow for support   - expressed to daughter Vanessa my concerns about overall poor prognosis and difficulties with keeping him out of the hospital   - she is very tearful and also concerned about her father

## 2022-06-22 NOTE — PHYSICAL THERAPY INITIAL EVALUATION ADULT - PERTINENT HX OF CURRENT PROBLEM, REHAB EVAL
88M with  ESRD on HD, anemia, CHF with preserved EF, HTN, CAD, AFIB, PVD s/p recent bypass, LLE DVT, recently admitted with gastrointestinal bleeding, hemorrhagic shock related to graft bleeding s/p ligation of bleeding CryoVein fem-AT bypass, s/p left AKA, now here with recurrent fevers

## 2022-06-22 NOTE — PHYSICAL THERAPY INITIAL EVALUATION ADULT - ADDITIONAL COMMENTS
Pt lives with spouse in a house with 3 [address] ramp. Pt has and w/c. Pt requires assist for functional mobility, ADLs, and IADLs. Pt has family available to assist as needed

## 2022-06-22 NOTE — PATIENT PROFILE ADULT. - FUNCTIONAL SCREEN CURRENT LEVEL: TOILETING, MLM
Detail Level: Detailed Quality 226: Preventive Care And Screening: Tobacco Use: Screening And Cessation Intervention: Patient screened for tobacco use and is an ex/non-smoker Quality 130: Documentation Of Current Medications In The Medical Record: Current Medications Documented Quality 402: Tobacco Use And Help With Quitting Among Adolescents: Patient screened for tobacco and never smoked Quality 431: Preventive Care And Screening: Unhealthy Alcohol Use - Screening: Patient not identified as an unhealthy alcohol user when screened for unhealthy alcohol use using a systematic screening method (2) assistive person

## 2022-06-22 NOTE — PROGRESS NOTE ADULT - PROBLEM SELECTOR PLAN 1
Right pigtail catheter placed 6/19   Maintain RIGHT pigtail to water seal  Daily CXR while chest tube is in place.  Light's criteria - Transudative   Pleural Fluid: Gram stain NGTD, fungal pending  Pleural fluid cyto NEGATIVE for malignancy   Discussed with Dr. Reynolds. Right pigtail catheter placed 6/19   Right chest tube removed 6/22 - no ptx on post removal CXR  Light's criteria - Transudative   Pleural Fluid: Gram stain NGTD, fungal pending  Pleural fluid cyto NEGATIVE for malignancy   Discussed with Dr. Reynolds.

## 2022-06-22 NOTE — PHYSICAL THERAPY INITIAL EVALUATION ADULT - IMPAIRED TRANSFERS: BED/CHAIR, REHAB EVAL
impaired balance/decreased flexibility/impaired motor control/pain/impaired postural control/decreased strength

## 2022-06-22 NOTE — PROGRESS NOTE ADULT - ASSESSMENT
This 88 y.o. M with PMHX ESRD/HD on Mondays and Fridays ([facility]), Hx GIB 2/2 AVM, CAD, HTN, HLD, PPM, CHFpEF, PAD s/p L Fem Bypass with cryovein c/b acute graft rethrombosis s/p thrombectomy now s/p L AKA discharged 2 days ago from Vascular Service after SICU admission for Hemorrhagic Shock s/p L AKA on Augmentin for "FUO". Patient brought back to [facility] ER for recurrent fevers despite abx with Augmentin and delirium. CXR from prior hospitalization with blunted costophrenic angles.   COVID/RVP Negative . Pt evaluated by Vascular Surgery for L AKA in ER. CT Surgery consulted for effusion possible parapneumonic effusion. ID consulted. Discussed plan of care with daughter and grandson at bedside. DNR/DNI confirmed but want all other medical and surgical interventions.  (18 Jun 2022 23:40)    daughter at bedside.   recently discharged 6/16/22 per daughter, had low grade fever at that time.   at home progressively decline.  EMS was called, evaluated the patient, and brought to hospital.     fevers noted here.   CT scan showed:  1.  Bilateral pleural effusions (right greater than left).  2.  Complete passive atelectasis of the right lower lobe.  3.  Bilateral linear atelectasis.  4.  Groundglass opacities and septal thickening are of uncertain etiology.  5.  Bilateral pulmonary nodules, some which have a branching morphology   are uncertain etiology. A follow-up is recommended in 4-6 weeks to   evaluate for resolution/change        Impression  fevers  weakness  pleural effusions  ESRD on HD      Plan:  workup in progress.  So far cultures remain negative      s/p drainage of pleural effusion 6/19/22   cultures from pleural fluid, so far is negative   - s/p removal of Right chest tube 6/22/22    patient remains stable;   Continue zosyn 3.375 grams Q 12H  anticipate a 7 day course for possible PNA; thru 7/25/22    When ready for discharge to community ,  can Consider changing antibiotics to Augmentin 500mg PO daily, thru 7/25/22      ESRD on HD  - renal following      Please call me back if I may be of further assistance.  Hospital Consult line:  [phone].  Thank you.

## 2022-06-22 NOTE — PROGRESS NOTE ADULT - SUBJECTIVE AND OBJECTIVE BOX
[facility] DIVISION OF KIDNEY DISEASES AND HYPERTENSION -- INITIAL CONSULT NOTE  --------------------------------------------------------------------------------  HPI:  88M with PMHX ESRD/HD ([facility]), Hx GIB 2/2 AVM, CAD, HTN, HLD, PPM, CHFpEF, PAD s/p L Fem Bypass with cryovein c/b acute graft rethrombosis s/p thrombectomy now s/p L AKA discharged 2 days ago from Vascular Service after SICU admission for Hemorrhagic Shock s/p L AKA on Augmentin for "FUO". Patient brought back to [facility] ER for recurrent fevers despite abx with Augmentin and delirium. CXR from prior hospitalization with blunted costophrenic angles. Repeat CXR unchanged. COVID/RVP Negative. CT Chest WO done shows moderate-large pleural effusion on my review awaiting official read by Radiology. Pt evaluated by Vascular Surgery for L AKA in ER. CT Surgery consulted for effusion possible parapneumonic effusion. ID consulted. Discussed plan of care with daughter and grandson at bedside. DNR/DNI confirmed but want all other medical and surgical interventions. Agreed to reconsult Palliative Care. Family also requesting Cardiology Consult for PPM interrogation while inpt. ROS +Delirium +Insomnia (no sleep x48 hours) +Fevers. ROS negative unless mentioned.   Pt seen/examined; due for HD in AM; daughter bedside; plan for pigtail catheter for effusion;    No complaints    PAST HISTORY  --------------------------------------------------------------------------------  PAST MEDICAL & SURGICAL HISTORY:  DM (diabetes mellitus)  - resolved      AV block, 1st degree      Arrhythmia      CAD (coronary artery disease)      HTN (hypertension)      VT (ventricular tachycardia)      RICHARDS (dyspnea on exertion)      Anemia      Risk factors for obstructive sleep apnea      ESRD on dialysis  HD on Mondays and Fridays      Aortic stenosis  - s/p valve replacement      GI bleeding  due to ulcerated polyps and colonic AVMs      H/O circumcision  at  age  65      H/O left knee surgery      H/O angioplasty  2013,  no  intervention      A-V fistula  left arm 5/2017      H/O carotid endarterectomy  Right      S/P TAVR (transcatheter aortic valve replacement)      History of loop recorder        FAMILY HISTORY:  Family history of cancer (Grandparent)    Family history of lung cancer (Grandparent)    Family history of premature CAD    FH: type 2 diabetes      PAST SOCIAL HISTORY:    ALLERGIES & MEDICATIONS  --------------------------------------------------------------------------------  Allergies    Plavix (Hives)  Toprol-XL (Rash)    Intolerances      Standing Inpatient Medications  aspirin  chewable 81 milliGRAM(s) Oral daily  atorvastatin 80 milliGRAM(s) Oral at bedtime  fentaNYL   Patch  12 MICROgram(s)/Hr 1 Patch Transdermal every 72 hours  heparin   Injectable 5000 Unit(s) SubCutaneous every 12 hours  isosorbide   mononitrate ER Tablet (IMDUR) 30 milliGRAM(s) Oral daily  lidocaine 1% (Preservative-free) Injectable 20 milliLiter(s) Local Injection once  melatonin 3 milliGRAM(s) Oral at bedtime  multivitamin 1 Tablet(s) Oral daily  Nephro-pito 1 Tablet(s) Oral at bedtime  pantoprazole    Tablet 40 milliGRAM(s) Oral before breakfast  piperacillin/tazobactam IVPB.. 3.375 Gram(s) IV Intermittent every 12 hours  QUEtiapine 25 milliGRAM(s) Oral at bedtime  sevelamer carbonate 800 milliGRAM(s) Oral every 8 hours  tamsulosin 0.4 milliGRAM(s) Oral at bedtime  torsemide 20 milliGRAM(s) Oral daily  traZODone 50 milliGRAM(s) Oral at bedtime    PRN Inpatient Medications  acetaminophen     Tablet .. 650 milliGRAM(s) Oral every 6 hours PRN  aluminum hydroxide/magnesium hydroxide/simethicone Suspension 30 milliLiter(s) Oral every 4 hours PRN  melatonin 3 milliGRAM(s) Oral at bedtime PRN  ondansetron Injectable 4 milliGRAM(s) IV Push every 8 hours PRN      REVIEW OF SYSTEMS  --------------------------------------------------------------------------------  Limited due to lethargy    VITALS/PHYSICAL EXAM  --------------------------------------------------------------------------------  ICU Vital Signs Last 24 Hrs  T(C): 37 (22 Jun 2022 09:00), Max: 37.6 (21 Jun 2022 21:12)  T(F): 98.6 (22 Jun 2022 09:00), Max: 99.6 (21 Jun 2022 21:12)  HR: 59 (22 Jun 2022 09:00) (54 - 65)  BP: 135/59 (22 Jun 2022 09:00) (119/87 - 142/59)  BP(mean): --  ABP: --  ABP(mean): --  RR: 18 (22 Jun 2022 09:00) (18 - 18)  SpO2: 100% (22 Jun 2022 09:00) (98% - 100%)        Physical Exam:  	Gen: NAD   	HEENT: supple neck  	Pulm: dec BS  	CV: RRR, S1S2; no rub  	Abd: +BS, soft, nontender/nondistended  	: No suprapubic tenderness  	UE: Warm, no clubbing, no edema; no asterixis  	LE: +LLE AKA C/D/I Bandage in place +RLE WNL  	Neuro: lethargic  	Skin: Warm, without rashes      LABS/STUDIES  --------------------------------------------------------------------------------                                10.1   5.22  )-----------( 290      ( 22 Jun 2022 07:02 )             33.6     06-22    141  |  97<L>  |  23.4<H>  ----------------------------<  95  4.2   |  28.0  |  3.16<H>    Ca    9.0      22 Jun 2022 07:02    TPro  6.3<L>  /  Alb  2.8<L>  /  TBili  0.5  /  DBili  x   /  AST  28  /  ALT  17  /  AlkPhos  281<H>  06-22

## 2022-06-22 NOTE — PROGRESS NOTE ADULT - ASSESSMENT
89 yo M PMH ESRD on HD, anemia, TAVR, CHF with preserved EF, HTN, CAD, AFIB, PVD s/p recent bypass, LLE DVT, recently admitted with gastrointestinal bleeding, here with hemorrhagic shock related to graft bleeding s/p ligation of bleeding CryoVein fem-AT bypass, with AV graft thrombosis. Pt with recent admission to general surgery and LLE AKA. Patient has had pleural effusion in past sometime 2 years ago per family. Pt underwent CT chest which shows moderate right sided pleural effusion. Right pigtail catheter was placed 6/19/22 with 750cc serous fluid drained. Thoracic surgery following right chest tube, plan to d/c once output is minimal.

## 2022-06-22 NOTE — PHYSICAL THERAPY INITIAL EVALUATION ADULT - RANGE OF MOTION EXAMINATION, REHAB EVAL
left LE difficult to test secondary to AKA, hip WFL/bilateral upper extremity ROM was WFL (within functional limits)/bilateral lower extremity ROM was WFL (within functional limits)

## 2022-06-22 NOTE — PROGRESS NOTE ADULT - ASSESSMENT
1) ESRD on HD  2) MBD of renal dx  3) Anemia of renal dx  4) Vol HTN    -HD M,F outpatient  -Will plan for dialysis tomorrow 6/23/22 as family will not be able to take patient to HD center on Friday  -EPO ordered for next dialysis  -Will plan for 1.5L UF  -May need Albumin with HD    D/W Family and Dr. Davenport

## 2022-06-22 NOTE — DISCHARGE NOTE NURSING/CASE MANAGEMENT/SOCIAL WORK - NSDCPEFALRISK_GEN_ALL_CORE
For information on Fall & Injury Prevention, visit: [email or web address] OR  [email or web address] OR  https://www.cdc.gov/steadi/patient.html

## 2022-06-22 NOTE — PROGRESS NOTE ADULT - SUBJECTIVE AND OBJECTIVE BOX
OVERNIGHT EVENTS: afebrile     Present Symptoms:     Dyspnea: none   Nausea/Vomiting: No  Anxiety:  No  Depression: unable   Fatigue: Yes   Loss of appetite: No  Constipation: none     Pain: none             Character-            Duration-            Effect-            Factors-            Frequency-            Location-            Severity-    Pain AD Score:  [email or web address] (press ctrl + left click to view)    Review of Systems: Reviewed            Unable to obtain due to poor mentation   All others negative    MEDICATIONS  (STANDING):  aspirin  chewable 81 milliGRAM(s) Oral daily  atorvastatin 80 milliGRAM(s) Oral at bedtime  chlorhexidine 2% Cloths 1 Application(s) Topical daily  fentaNYL   Patch  12 MICROgram(s)/Hr 1 Patch Transdermal every 72 hours  heparin   Injectable 5000 Unit(s) SubCutaneous every 12 hours  isosorbide   mononitrate ER Tablet (IMDUR) 30 milliGRAM(s) Oral daily  lidocaine   4% Patch 1 Patch Transdermal every 24 hours  lidocaine 1% (Preservative-free) Injectable 20 milliLiter(s) Local Injection once  multivitamin 1 Tablet(s) Oral daily  Nephro-pito 1 Tablet(s) Oral at bedtime  pantoprazole    Tablet 40 milliGRAM(s) Oral before breakfast  piperacillin/tazobactam IVPB.. 3.375 Gram(s) IV Intermittent every 12 hours  QUEtiapine 25 milliGRAM(s) Oral at bedtime  sevelamer carbonate 800 milliGRAM(s) Oral every 8 hours  tamsulosin 0.4 milliGRAM(s) Oral at bedtime  traZODone 50 milliGRAM(s) Oral at bedtime    MEDICATIONS  (PRN):  acetaminophen     Tablet .. 650 milliGRAM(s) Oral every 6 hours PRN Temp greater or equal to 38C (100.4F), Mild Pain (1 - 3)  ondansetron Injectable 4 milliGRAM(s) IV Push every 8 hours PRN Nausea and/or Vomiting    PHYSICAL EXAM:    Vital Signs Last 24 Hrs  T(C): 37 (22 Jun 2022 09:00), Max: 37.6 (21 Jun 2022 21:12)  T(F): 98.6 (22 Jun 2022 09:00), Max: 99.6 (21 Jun 2022 21:12)  HR: 59 (22 Jun 2022 09:00) (52 - 65)  BP: 135/59 (22 Jun 2022 09:00) (119/87 - 142/59)  BP(mean): --  RR: 18 (22 Jun 2022 09:00) (18 - 18)  SpO2: 100% (22 Jun 2022 09:00) (98% - 100%)    General: alert, cachexia     Karnofsky:  30 %    HEENT: normal      Lungs: comfortable     CV: normal      GI: normal      : anuria/oliguria     MSK: bedbound/wheelchair bound    Skin:  no rash    LABS:                      10.1   5.22  )-----------( 290      ( 22 Jun 2022 07:02 )             33.6     06-22    141  |  97<L>  |  23.4<H>  ----------------------------<  95  4.2   |  28.0  |  3.16<H>    Ca    9.0      22 Jun 2022 07:02    TPro  6.3<L>  /  Alb  2.8<L>  /  TBili  0.5  /  DBili  x   /  AST  28  /  ALT  17  /  AlkPhos  281<H>  06-22    I&O's Summary    21 Jun 2022 07:01  -  22 Jun 2022 07:00  --------------------------------------------------------  IN: 320 mL / OUT: 20 mL / NET: 300 mL    RADIOLOGY & ADDITIONAL STUDIES:    ADVANCE DIRECTIVES/TREATMENT PREFERENCES:  DNR/DNI

## 2022-06-22 NOTE — PROGRESS NOTE ADULT - SUBJECTIVE AND OBJECTIVE BOX
[facility]                                                INFECTIOUS DISEASES  =======================================================                               Jeet Bryan MD#  Mango Oliver MD*                                     Andrew Kaba MD*    Radha Short MD*            Diplomates American Board of Internal Medicine & Infectious Diseases                  # [place] Office - Appt - Tel  [phone] Fax [phone]                * [place] Office - Appt - Tel [phone] Fax [phone]                                  Hospital Consult line:  [phone]  =======================================================    [facility]-[id number]  CHRISTIANO ELIZABETH   follow up: weakness, SOB    s/p interval removal of the right chest tube.          I have personally reviewed the labs and data; pertinent labs and data are listed in this note; please see below.   =======================================================  Past Medical & Surgical Hx:  =====================  PAST MEDICAL & SURGICAL HISTORY:  DM (diabetes mellitus) - resolved  AV block, 1st degree  Arrhythmia  CAD (coronary artery disease)  HTN (hypertension)  VT (ventricular tachycardia)  RICHARDS (dyspnea on exertion)  Anemia  Risk factors for obstructive sleep apnea  ESRD on dialysis HD on Mondays and Fridays  Aortic stenosis - s/p valve replacement  GI bleeding due to ulcerated polyps and colonic AVMs  H/O circumcision at  age  65  H/O left knee surgery  H/O angioplasty 2013,  no  intervention  A-V fistula left arm 5/2017  H/O carotid endarterectomy Right  S/P TAVR (transcatheter aortic valve replacement)  History of loop recorder    Problem List:  ==========  HEALTH ISSUES - PROBLEM Dx:  Pleural effusion    Social Hx:  =======  no toxic habits currently    FAMILY HISTORY:  Family history of cancer (Grandparent)  Family history of lung cancer (Grandparent)  Family history of premature CAD  FH: type 2 diabetes    no significant family history of immunosuppressive disorders in mother or father   =======================================================    REVIEW OF SYSTEMS:  CONSTITUTIONAL:  + WEAKNESS  HEENT:  No diplopia or blurred vision.  No earache, sore throat or runny nose.  CARDIOVASCULAR:  No pressure, squeezing, strangling, tightness, heaviness or aching about the chest, neck, axilla or epigastrium.  RESPIRATORY:  No cough, shortness of breath  GASTROINTESTINAL:  No nausea, vomiting or diarrhea.  GENITOURINARY:  No dysuria, frequency or urgency. No Blood in urine  MUSCULOSKELETAL:  no joint aches, no muscle pain  SKIN:  No change in skin, hair or nails.  NEUROLOGIC:  No Headaches, seizures or weakness.  PSYCHIATRIC:  No disorder of thought or mood.  ENDOCRINE:  No heat or cold intolerance  HEMATOLOGICAL:  No easy bruising or bleeding.    =======================================================  Allergies  Plavix (Hives)  Toprol-XL (Rash)      ======================================================  Physical Exam:  ============     General:  No acute distress. frail  Eye: Pupils are equal, round and reactive to light, Extraocular movements are intact, Normal conjunctiva.  HENT: Normocephalic, Oral mucosa is dry, EDENTULOUS  Neck: Supple, No lymphadenopathy.  Respiratory: Lungs with decreased air entry at bases    interval RIGHT side chest tube   Cardiovascular: Normal rate, Regular rhythm,  Left chest wall implanted cardiac device  RIGHT sided HD catheter.   Gastrointestinal: Soft, Non-tender, Non-distended, Normal bowel sounds.  Genitourinary: No costovertebral angle tenderness.  Lymphatics: No lymphadenopathy neck,   Musculoskeletal:  LEFT BKA  Integumentary: No rash.  Neurologic: Alert, Oriented, No focal deficits, Cranial Nerves II-XII are grossly intact.  Psychiatric: Appropriate mood & affect.    =======================================================  Vitals:  ============  T(F): 98.6 (22 Jun 2022 09:00), Max: 99.6 (21 Jun 2022 21:12)  HR: 59 (22 Jun 2022 09:00)  BP: 135/59 (22 Jun 2022 09:00)  RR: 18 (22 Jun 2022 09:00)  SpO2: 100% (22 Jun 2022 09:00) (98% - 100%)  temp max in last 48H T(F): , Max: 99.6 (06-20-22 @ 15:00)    =======================================================  Current Antibiotics:  piperacillin/tazobactam IVPB.. 3.375 Gram(s) IV Intermittent every 12 hours    Other medications:  aspirin  chewable 81 milliGRAM(s) Oral daily  atorvastatin 80 milliGRAM(s) Oral at bedtime  chlorhexidine 2% Cloths 1 Application(s) Topical daily  fentaNYL   Patch  12 MICROgram(s)/Hr 1 Patch Transdermal every 72 hours  heparin   Injectable 5000 Unit(s) SubCutaneous every 12 hours  isosorbide   mononitrate ER Tablet (IMDUR) 30 milliGRAM(s) Oral daily  lidocaine   4% Patch 1 Patch Transdermal every 24 hours  lidocaine 1% (Preservative-free) Injectable 20 milliLiter(s) Local Injection once  multivitamin 1 Tablet(s) Oral daily  Nephro-pito 1 Tablet(s) Oral at bedtime  pantoprazole    Tablet 40 milliGRAM(s) Oral before breakfast  QUEtiapine 25 milliGRAM(s) Oral at bedtime  sevelamer carbonate 800 milliGRAM(s) Oral every 8 hours  tamsulosin 0.4 milliGRAM(s) Oral at bedtime  traZODone 50 milliGRAM(s) Oral at bedtime      =======================================================  Labs:                        10.1   5.22  )-----------( 290      ( 22 Jun 2022 07:02 )             33.6     06-22    141  |  97<L>  |  23.4<H>  ----------------------------<  95  4.2   |  28.0  |  3.16<H>    Ca    9.0      22 Jun 2022 07:02    TPro  6.3<L>  /  Alb  2.8<L>  /  TBili  0.5  /  DBili  x   /  AST  28  /  ALT  17  /  AlkPhos  281<H>  06-22      Culture - Fungal, Body Fluid (collected 06-19-22 @ 18:21)  Source: .Body Fluid Pleural Fluid    Culture - Body Fluid with Gram Stain (collected 06-19-22 @ 18:21)  Source: .Body Fluid Pleural Fluid  Gram Stain (06-19-22 @ 23:08):    polymorphonuclear leukocytes seen    No organisms seen    by cytocentrifuge    Culture - Blood (collected 06-19-22 @ 03:11)  Source: .Blood Blood-Peripheral    Culture - Blood (collected 06-19-22 @ 03:08)  Source: .Blood Blood-Peripheral    Culture - Blood (collected 06-14-22 @ 05:44)  Source: .Blood Blood  Final Report (06-19-22 @ 06:00):    No Growth Final    Culture - Blood (collected 06-14-22 @ 05:44)  Source: .Blood Blood  Final Report (06-19-22 @ 06:00):    No Growth Final    Procalcitonin, Serum: 1.37 ng/mL (06-19-22 @ 07:37)    SARS-CoV-2: NotDetec (06-18-22 @ 17:24)  COVID-19 PCR: NotDetec (06-13-22 @ 06:58)  COVID-19 PCR: NotDetec (06-03-22 @ 18:58)  COVID-19 PCR: NotDetec (05-31-22 @ 04:59)      =======================================================     < from: CT Chest No Cont (06.18.22 @ 20:58) >    ACC: [id number] EXAM:  CT CHEST                          PROCEDURE DATE:  06/18/2022          INTERPRETATION:  CT CHEST WITH CONTRAST    INDICATION: And shortness of breath. Weakness.    TECHNIQUE: Unenhanced helical images were obtained of the chest. Coronal and sagittal images were reconstructed. Maximum intensity projection images were generated.    COMPARISON: Radiograph 6/18/2022. CT chest 6/6/2021 and 9/16/2020.    FINDINGS:    Tubes/Lines: Catheter via right-sided approach with its tipin the SVC.    Lungs, airways and pleura: Bilateral pleural effusions (right greater   than left).    Complete atelectasis of the right lower lobe. Passive atelectasis in the   right middle lobe and left lower lobe.    There is bilateral septal thickening and groundglass opacities. In   addition, there are bilateral 3 mm pulmonary nodules, some which have a   branching distribution.    The airways are unremarkable.    Mediastinum: The thyroid gland is unremarkable. The chest lymph nodes   measureless than 10 mm the short axis. The esophagus is unremarkable.    The heart is enlarged. Coronary artery calcifications. Mitral annular   calcification. Status post TAVR.    Upper Abdomen: Cholelithiasis. Upper abdominal ascites. Partially imaged   hypodense left renal lesions likely represent cysts.    Bones And Soft Tissues: Loop recorder.  The soft tissues are   unremarkable. Spondylosis of the thoracic spine.      IMPRESSION:    1.  Bilateral pleural effusions (right greater than left).  2.  Complete passive atelectasis of the right lower lobe.  3.  Bilateral linear atelectasis.  4.  Groundglass opacities and septal thickening are of uncertain etiology.  5.  Bilateral pulmonary nodules, some which have a branching morphology  are uncertain etiology. A follow-up is recommended in 4-6 weeks to evaluate for resolution/change    --- End of Report ---      NEREIDA RUSSO MD; Attending Radiologist  This document has been electronically signed. Jun 19 2022 11:21AM    < end of copied text >

## 2022-06-22 NOTE — PROGRESS NOTE ADULT - ASSESSMENT
88M with PMHX ESRD/HD ([facility]athi), Hx GIB 2/2 AVM, CAD, HTN, HLD, PPM, CHFpEF, PAD s/p L Fem Bypass with cryovein c/b acute graft rethrombosis s/p thrombectomy now s/p L AKA discharged 2 days ago from Vascular Service after SICU admission for Hemorrhagic Shock s/p L AKA on Augmentin for "FUO" discharged two days admitted for Recurrent Fevers and AMS r/o Pleural Effusion/Parapneumonic Effusions.    #Sepsis secondary to PNA c/b Parapneumonic Effusions   Oxygen via NC  CXR +blunted costophrenic angles  CT Chest with b/l pleural effusions. - Needs repeat SCan in 4-6 weeks for b/l pulmonary nodules   C/w Zosyn - Can transition to augmentin on dc  Vancomycin 1g IV x1  COVID/RVP Negative  Repeat BCX NGTD so far  MRSA PCR Neg  Legionella/Strep PNA pending  CT Surgery recs appreciated - S/p Chest tube placement and now removal   Vascular Sx recs appreciated  ID recs appreciated     #PAD s/p L Fem Bypass with cryovein c/b acute graft rethrombosis s/p thrombectomy now s/p L AKA  Fentanyl Patch  Vascular Surgery recs appreciated    #Hx LLE DVT, Hx AFIB  Hemorrhagic Shock last admission on Eliquis  AC Held during last hospitalization    #ESRD/HD   Torsemide 20mg PO q24  Renal recs appreciated  Nephrovite Daily  Renal Diet + Sevelamer TID ACHS  unalbe to get HD on 6/20 for hypotension  Will monitor for now     #Hypotension/Bradycardia   /43 on admission.   250cc IVFB NSS given in ED  Hold Nifedipine 90mg qAM  Cardio recs appreciated  Started on midodrine    #HTN, HLD, CAD, CHFpEF  Torsemide 20mg PO q24  Hold Nifedipine 90mg PO q24  ASA 81mg PO q24  Atorvastatin 80mg PO q24  Imdur 30mg ER q24    #Delirium  Melatonin 3mg PO q24  Trazodone 50mg PO q24  Seroquel 25mg PO qHS      #Constipation  Hold Miralax and Senna given antibiotic-induced loose stool reported    Goals of Care  -DNR/DNI Confirmed. See MOLST Prior. Family wants all other medical and surgical options apart from CPR/Mechanical Ventilation. Palliative Consult for continuity.      Dispo: Pending course    Spoke to patients daughter Vanessa in person    DC plan home.

## 2022-06-23 NOTE — PROGRESS NOTE ADULT - SUBJECTIVE AND OBJECTIVE BOX
OVERNIGHT EVENTS: persistent fevers, now awaiting CT chest, antibiotics altered by ID     Present Symptoms:     Dyspnea: none   Nausea/Vomiting: No  Anxiety:  No  Depression: unable   Fatigue: No  Loss of appetite: unable   Constipation: none     Pain: none             Character-            Duration-            Effect-            Factors-            Frequency-            Location-            Severity-    Pain AD Score:  [email or web address] (press ctrl + left click to view)    Review of Systems: Reviewed                     Negative: no chest pain                     All others negative    MEDICATIONS  (STANDING):  aspirin  chewable 81 milliGRAM(s) Oral daily  atorvastatin 80 milliGRAM(s) Oral at bedtime  chlorhexidine 2% Cloths 1 Application(s) Topical daily  epoetin juan-epbx (RETACRIT) Injectable 58528 Unit(s) IV Push once  fentaNYL   Patch  12 MICROgram(s)/Hr 1 Patch Transdermal every 72 hours  heparin   Injectable 5000 Unit(s) SubCutaneous every 12 hours  isosorbide   mononitrate ER Tablet (IMDUR) 30 milliGRAM(s) Oral daily  lidocaine   4% Patch 1 Patch Transdermal every 24 hours  lidocaine 1% (Preservative-free) Injectable 20 milliLiter(s) Local Injection once  meropenem  IVPB 500 milliGRAM(s) IV Intermittent every 12 hours  multivitamin 1 Tablet(s) Oral daily  Nephro-pito 1 Tablet(s) Oral at bedtime  pantoprazole    Tablet 40 milliGRAM(s) Oral before breakfast  QUEtiapine 25 milliGRAM(s) Oral at bedtime  sevelamer carbonate 800 milliGRAM(s) Oral every 8 hours  tamsulosin 0.4 milliGRAM(s) Oral at bedtime  traZODone 50 milliGRAM(s) Oral at bedtime    MEDICATIONS  (PRN):  acetaminophen     Tablet .. 650 milliGRAM(s) Oral every 6 hours PRN Temp greater or equal to 38C (100.4F), Mild Pain (1 - 3)  ondansetron Injectable 4 milliGRAM(s) IV Push every 8 hours PRN Nausea and/or Vomiting    PHYSICAL EXAM:    Vital Signs Last 24 Hrs  T(C): 36.7 (23 Jun 2022 10:06), Max: 38.8 (23 Jun 2022 04:21)  T(F): 98 (23 Jun 2022 10:06), Max: 101.9 (23 Jun 2022 04:21)  HR: 58 (23 Jun 2022 10:06) (56 - 69)  BP: 133/54 (23 Jun 2022 10:06) (127/51 - 161/64)  BP(mean): --  RR: 18 (23 Jun 2022 10:06) (18 - 18)  SpO2: 98% (23 Jun 2022 10:06) (97% - 100%)    General: alert able to answer simple yes and no questions     Karnofsky:  30 %    HEENT: normal      Lungs: comfortable    CV: normal      GI: normal      : oliguria/anuria     MSK: bedbound/wheelchair bound    Skin: no rash    LABS:                      8.8    6.31  )-----------( 295      ( 23 Jun 2022 07:01 )             28.8     06-23    138  |  96<L>  |  36.5<H>  ----------------------------<  103<H>  4.5   |  27.0  |  4.23<H>    Ca    9.0      23 Jun 2022 07:01    TPro  6.1<L>  /  Alb  2.8<L>  /  TBili  0.4  /  DBili  x   /  AST  33  /  ALT  20  /  AlkPhos  299<H>  06-23    I&O's Summary    22 Jun 2022 07:01  -  23 Jun 2022 07:00  --------------------------------------------------------  IN: 390 mL / OUT: 1 mL / NET: 389 mL    RADIOLOGY & ADDITIONAL STUDIES:    ADVANCE DIRECTIVES/TREATMENT PREFERENCES:  DNR/DNI

## 2022-06-23 NOTE — PROGRESS NOTE ADULT - ASSESSMENT
This 88 y.o. M with PMHX ESRD/HD on Mondays and Fridays ([facility]), Hx GIB 2/2 AVM, CAD, HTN, HLD, PPM, CHFpEF, PAD s/p L Fem Bypass with cryovein c/b acute graft rethrombosis s/p thrombectomy now s/p L AKA discharged 2 days ago from Vascular Service after SICU admission for Hemorrhagic Shock s/p L AKA on Augmentin for "FUO". Patient brought back to [facility] ER for recurrent fevers despite abx with Augmentin and delirium. CXR from prior hospitalization with blunted costophrenic angles.   COVID/RVP Negative . Pt evaluated by Vascular Surgery for L AKA in ER. CT Surgery consulted for effusion possible parapneumonic effusion. ID consulted. Discussed plan of care with daughter and grandson at bedside. DNR/DNI confirmed but want all other medical and surgical interventions.  (18 Jun 2022 23:40)    daughter at bedside.   recently discharged 6/16/22 per daughter, had low grade fever at that time.   at home progressively decline.  EMS was called, evaluated the patient, and brought to hospital.     fevers noted here.   CT scan showed:  1.  Bilateral pleural effusions (right greater than left).  2.  Complete passive atelectasis of the right lower lobe.  3.  Bilateral linear atelectasis.  4.  Groundglass opacities and septal thickening are of uncertain etiology.  5.  Bilateral pulmonary nodules, some which have a branching morphology   are uncertain etiology. A follow-up is recommended in 4-6 weeks to   evaluate for resolution/change        Impression  fevers  weakness  pleural effusions  ESRD on HD      Plan:  initial workup for fever negative     repeat fevers 101.9 on 6/23 at 4AM  - ? Aspiration   - repeat blood cultures  - check sputum cx if productive  - d/c zosyn  start Merrem 500mg Q12H for possible PNA    CHECK CT chest.  ASAP      s/p drainage of pleural effusion 6/19/22   cultures from pleural fluid, so far is negative   - s/p removal of Right chest tube 6/22/22          ESRD on HD  - renal following       will follow

## 2022-06-23 NOTE — DIETITIAN INITIAL EVALUATION ADULT - ETIOLOGY
related to inability to consume sufficient protein energy intake with decreased appetite in setting of multiple comorbidities and hospitalizations

## 2022-06-23 NOTE — PROGRESS NOTE ADULT - SUBJECTIVE AND OBJECTIVE BOX
[facility]                                                INFECTIOUS DISEASES  =======================================================                               Jeet Bryan MD#  Mango Oliver MD*                                     Andrew Kaba MD*    Radha Short MD*            Diplomates American Board of Internal Medicine & Infectious Diseases                  # [place] Office - Appt - Tel  [phone] Fax [phone]                * [place] Office - Appt - Tel [phone] Fax [phone]                                  Hospital Consult line:  [phone]  =======================================================    [facility]-[id number]  CHRISTIANO ELIZABETH   called back for fever, lethargy    spoke to pt's daughter.   fever 102 last night    more lethargic this AM     I have personally reviewed the labs and data; pertinent labs and data are listed in this note; please see below.   =======================================================  Past Medical & Surgical Hx:  =====================  PAST MEDICAL & SURGICAL HISTORY:  DM (diabetes mellitus) - resolved  AV block, 1st degree  Arrhythmia  CAD (coronary artery disease)  HTN (hypertension)  VT (ventricular tachycardia)  RICHARDS (dyspnea on exertion)  Anemia  Risk factors for obstructive sleep apnea  ESRD on dialysis HD on Mondays and Fridays  Aortic stenosis - s/p valve replacement  GI bleeding due to ulcerated polyps and colonic AVMs  H/O circumcision at  age  65  H/O left knee surgery  H/O angioplasty 2013,  no  intervention  A-V fistula left arm 5/2017  H/O carotid endarterectomy Right  S/P TAVR (transcatheter aortic valve replacement)  History of loop recorder    Problem List:  ==========  HEALTH ISSUES - PROBLEM Dx:  Pleural effusion    Social Hx:  =======  no toxic habits currently    FAMILY HISTORY:  Family history of cancer (Grandparent)  Family history of lung cancer (Grandparent)  Family history of premature CAD  FH: type 2 diabetes    no significant family history of immunosuppressive disorders in mother or father   =======================================================    REVIEW OF SYSTEMS:  Limited due to medical condition      =======================================================  Allergies  Plavix (Hives)  Toprol-XL (Rash)      ======================================================  Physical Exam:  ============     General:  No acute distress. frail, Lethardic  Eye: Pupils are equal, round and reactive to light,    HENT: Normocephalic, Oral mucosa is dry, EDENTULOUS  Neck: Supple, No lymphadenopathy.  Respiratory: Lungs with decreased air entry at bases   Cardiovascular: Normal rate, Regular rhythm,  Left chest wall implanted cardiac device  RIGHT sided HD catheter.   Gastrointestinal: Soft, Non-tender, Non-distended, Normal bowel sounds.  Genitourinary: No costovertebral angle tenderness.  Lymphatics: No lymphadenopathy neck,   Musculoskeletal:  LEFT BKA  Integumentary: No rash.  Neurologic: sleepy    =======================================================  Vitals:  ============  T(F): 98.2 (23 Jun 2022 07:45), Max: 101.9 (23 Jun 2022 04:21)  HR: 57 (23 Jun 2022 07:45)  BP: 127/51 (23 Jun 2022 07:45)  RR: 18 (23 Jun 2022 07:45)  SpO2: 100% (23 Jun 2022 07:45) (97% - 100%)  temp max in last 48H T(F): , Max: 101.9 (06-23-22 @ 04:21)    =======================================================  Current Antibiotics:  meropenem  IVPB 500 milliGRAM(s) IV Intermittent every 12 hours    Other medications:  aspirin  chewable 81 milliGRAM(s) Oral daily  atorvastatin 80 milliGRAM(s) Oral at bedtime  chlorhexidine 2% Cloths 1 Application(s) Topical daily  epoetin juan-epbx (RETACRIT) Injectable 93044 Unit(s) IV Push once  fentaNYL   Patch  12 MICROgram(s)/Hr 1 Patch Transdermal every 72 hours  heparin   Injectable 5000 Unit(s) SubCutaneous every 12 hours  isosorbide   mononitrate ER Tablet (IMDUR) 30 milliGRAM(s) Oral daily  lidocaine   4% Patch 1 Patch Transdermal every 24 hours  lidocaine 1% (Preservative-free) Injectable 20 milliLiter(s) Local Injection once  multivitamin 1 Tablet(s) Oral daily  Nephro-pito 1 Tablet(s) Oral at bedtime  pantoprazole    Tablet 40 milliGRAM(s) Oral before breakfast  QUEtiapine 25 milliGRAM(s) Oral at bedtime  sevelamer carbonate 800 milliGRAM(s) Oral every 8 hours  tamsulosin 0.4 milliGRAM(s) Oral at bedtime  traZODone 50 milliGRAM(s) Oral at bedtime      =======================================================  Labs:                        8.8    6.31  )-----------( 295      ( 23 Jun 2022 07:01 )             28.8     06-23    138  |  96<L>  |  36.5<H>  ----------------------------<  103<H>  4.5   |  27.0  |  4.23<H>    Ca    9.0      23 Jun 2022 07:01    TPro  6.1<L>  /  Alb  2.8<L>  /  TBili  0.4  /  DBili  x   /  AST  33  /  ALT  20  /  AlkPhos  299<H>  06-23      Culture - Fungal, Body Fluid (collected 06-19-22 @ 18:21)  Source: .Body Fluid Pleural Fluid    Culture - Body Fluid with Gram Stain (collected 06-19-22 @ 18:21)  Source: .Body Fluid Pleural Fluid  Gram Stain (06-19-22 @ 23:08):    polymorphonuclear leukocytes seen    No organisms seen    by cytocentrifuge    Culture - Blood (collected 06-19-22 @ 03:11)  Source: .Blood Blood-Peripheral    Culture - Blood (collected 06-19-22 @ 03:08)  Source: .Blood Blood-Peripheral    Culture - Blood (collected 06-14-22 @ 05:44)  Source: .Blood Blood  Final Report (06-19-22 @ 06:00):    No Growth Final    Culture - Blood (collected 06-14-22 @ 05:44)  Source: .Blood Blood  Final Report (06-19-22 @ 06:00):    No Growth Final         Procalcitonin, Serum: 1.37 ng/mL (06-19-22 @ 07:37)    SARS-CoV-2: NotDetec (06-18-22 @ 17:24)  COVID-19 PCR: NotDetec (06-13-22 @ 06:58)  COVID-19 PCR: NotDetec (06-03-22 @ 18:58)  COVID-19 PCR: NotDetec (05-31-22 @ 04:59)      =======================================================         < from: CT Chest No Cont (06.18.22 @ 20:58) >    ACC: [id number] EXAM:  CT CHEST                          PROCEDURE DATE:  06/18/2022          INTERPRETATION:  CT CHEST WITH CONTRAST    INDICATION: And shortness of breath. Weakness.    TECHNIQUE: Unenhanced helical images were obtained of the chest. Coronal and sagittal images were reconstructed. Maximum intensity projection images were generated.    COMPARISON: Radiograph 6/18/2022. CT chest 6/6/2021 and 9/16/2020.    FINDINGS:    Tubes/Lines: Catheter via right-sided approach with its tipin the SVC.    Lungs, airways and pleura: Bilateral pleural effusions (right greater   than left).    Complete atelectasis of the right lower lobe. Passive atelectasis in the   right middle lobe and left lower lobe.    There is bilateral septal thickening and groundglass opacities. In   addition, there are bilateral 3 mm pulmonary nodules, some which have a   branching distribution.    The airways are unremarkable.    Mediastinum: The thyroid gland is unremarkable. The chest lymph nodes   measureless than 10 mm the short axis. The esophagus is unremarkable.    The heart is enlarged. Coronary artery calcifications. Mitral annular   calcification. Status post TAVR.    Upper Abdomen: Cholelithiasis. Upper abdominal ascites. Partially imaged   hypodense left renal lesions likely represent cysts.    Bones And Soft Tissues: Loop recorder.  The soft tissues are   unremarkable. Spondylosis of the thoracic spine.      IMPRESSION:    1.  Bilateral pleural effusions (right greater than left).  2.  Complete passive atelectasis of the right lower lobe.  3.  Bilateral linear atelectasis.  4.  Groundglass opacities and septal thickening are of uncertain etiology.  5.  Bilateral pulmonary nodules, some which have a branching morphology  are uncertain etiology. A follow-up is recommended in 4-6 weeks to evaluate for resolution/change    --- End of Report ---      NEREIDA RUSSO MD; Attending Radiologist  This document has been electronically signed. Jun 19 2022 11:21AM    < end of copied text >

## 2022-06-23 NOTE — PROGRESS NOTE ADULT - SUBJECTIVE AND OBJECTIVE BOX
Hospitalist Daily Progress Note    Chief Complaint:  Patient is a 88y old  Male who presents with a chief complaint of Recurrent Fevers (23 Jun 2022 12:12)      SUBJECTIVE / OVERNIGHT EVENTS:  Patient was seen and examined at bedside. Lethargic. Weak. Febrile this AM.   Patient denies chest pain, SOB, abd pain, N/V, fever, chills, dysuria or any other complaints. All remainder ROS negative.     MEDICATIONS  (STANDING):  aspirin  chewable 81 milliGRAM(s) Oral daily  atorvastatin 80 milliGRAM(s) Oral at bedtime  chlorhexidine 2% Cloths 1 Application(s) Topical daily  epoetin juan-epbx (RETACRIT) Injectable 60364 Unit(s) IV Push once  fentaNYL   Patch  12 MICROgram(s)/Hr 1 Patch Transdermal every 72 hours  heparin   Injectable 5000 Unit(s) SubCutaneous every 12 hours  isosorbide   mononitrate ER Tablet (IMDUR) 30 milliGRAM(s) Oral daily  lidocaine   4% Patch 1 Patch Transdermal every 24 hours  lidocaine 1% (Preservative-free) Injectable 20 milliLiter(s) Local Injection once  meropenem  IVPB 500 milliGRAM(s) IV Intermittent every 12 hours  multivitamin 1 Tablet(s) Oral daily  Nephro-pito 1 Tablet(s) Oral at bedtime  pantoprazole    Tablet 40 milliGRAM(s) Oral before breakfast  QUEtiapine 25 milliGRAM(s) Oral at bedtime  sevelamer carbonate 800 milliGRAM(s) Oral every 8 hours  tamsulosin 0.4 milliGRAM(s) Oral at bedtime  traZODone 50 milliGRAM(s) Oral at bedtime    MEDICATIONS  (PRN):  acetaminophen     Tablet .. 650 milliGRAM(s) Oral every 6 hours PRN Temp greater or equal to 38C (100.4F), Mild Pain (1 - 3)  ondansetron Injectable 4 milliGRAM(s) IV Push every 8 hours PRN Nausea and/or Vomiting        I&O's Summary    22 Jun 2022 07:01  -  23 Jun 2022 07:00  --------------------------------------------------------  IN: 390 mL / OUT: 1 mL / NET: 389 mL        PHYSICAL EXAM:  Vital Signs Last 24 Hrs  T(C): 36.7 (23 Jun 2022 12:19), Max: 38.8 (23 Jun 2022 04:21)  T(F): 98.1 (23 Jun 2022 12:19), Max: 101.9 (23 Jun 2022 04:21)  HR: 60 (23 Jun 2022 12:19) (56 - 69)  BP: 157/62 (23 Jun 2022 12:19) (127/51 - 161/64)  BP(mean): --  RR: 18 (23 Jun 2022 12:19) (18 - 18)  SpO2: 100% (23 Jun 2022 12:19) (97% - 100%)      Constitutional: NAD, Resting, lethargic   ENT: Supple, No JVD  Lungs: Decreased breath sounds  Cardio: RRR, S1/S2, No murmur  Abdomen: Soft, Nontender, Nondistended; Bowel sounds present  Extremities: No calf tenderness, Left AKA   Musculoskeletal:   No clubbing or cyanosis of digits; no joint swelling or tenderness to palpation  Psych: Calm cooperative right now  Neuro: Awakens, yells randomly  Skin: No rashes; no palpable lesions    LABS:                        8.8    6.31  )-----------( 295      ( 23 Jun 2022 07:01 )             28.8     06-23    138  |  96<L>  |  36.5<H>  ----------------------------<  103<H>  4.5   |  27.0  |  4.23<H>    Ca    9.0      23 Jun 2022 07:01    TPro  6.1<L>  /  Alb  2.8<L>  /  TBili  0.4  /  DBili  x   /  AST  33  /  ALT  20  /  AlkPhos  299<H>  06-23              CAPILLARY BLOOD GLUCOSE      POCT Blood Glucose.: 93 mg/dL (23 Jun 2022 08:48)        RADIOLOGY REVIEWED

## 2022-06-23 NOTE — DIETITIAN NUTRITION RISK NOTIFICATION - TREATMENT: THE FOLLOWING DIET HAS BEEN RECOMMENDED
Diet, DASH/TLC:   Sodium & Cholesterol Restricted  Easy to Chew (EASYTOCHEW)    Start Time: 22:00  Supplement Feeding Modality:  Oral  Nepro Cans or Servings Per Day:  3       Frequency:  Three Times a day (06-22-22 @ 15:49) [Active]

## 2022-06-23 NOTE — PROGRESS NOTE ADULT - ASSESSMENT
88M with  ESRD on HD, anemia, CHF with preserved EF, HTN, CAD, AFIB, PVD s/p recent bypass, LLE DVT, recently admitted with gastrointestinal bleeding, hemorrhagic shock related to graft bleeding s/p ligation of bleeding CryoVein fem-AT bypass, s/p left AKA, now here with recurrent fevers.     #1 Fevers  - infectious disease following  - COVID/RVP negative   - CT A/P - left pleural effusion s/p chest tube  - cultures negative   - zosyn changed to meropenem 6/23   - CT chest ordered and pending   - patient likely with micro aspiration     #2 Pleural effusion  - s/p chest tube, CT surgery following  - culture negative     #3 ESRD  - HD as able per nephrology   - nephrology following    #4 S/P L AKA, LE pain   - PAD, failed femoral tibial bypass  - graft thrombosis  - L stump with ace bandage dry and intact  - vascular following   - continue fentanyl patch     #5 LLE DVT, afib  - no AC due to multiple bleeding episodes     #6 Encounter for palliative care  - continuing to follow for support

## 2022-06-23 NOTE — DIETITIAN INITIAL EVALUATION ADULT - NS FNS DIET ORDER
Diet, DASH/TLC:   Sodium & Cholesterol Restricted  Easy to Chew (EASYTOCHEW)    Start Time: 22:00  Supplement Feeding Modality:  Oral  Nepro Cans or Servings Per Day:  3       Frequency:  Three Times a day (06-22-22 @ 15:49)

## 2022-06-23 NOTE — DIETITIAN INITIAL EVALUATION ADULT - PERTINENT MEDS FT
MEDICATIONS  (STANDING):  aspirin  chewable 81 milliGRAM(s) Oral daily  atorvastatin 80 milliGRAM(s) Oral at bedtime  chlorhexidine 2% Cloths 1 Application(s) Topical daily  epoetin juan-epbx (RETACRIT) Injectable 35630 Unit(s) IV Push once  fentaNYL   Patch  12 MICROgram(s)/Hr 1 Patch Transdermal every 72 hours  heparin   Injectable 5000 Unit(s) SubCutaneous every 12 hours  isosorbide   mononitrate ER Tablet (IMDUR) 30 milliGRAM(s) Oral daily  lidocaine   4% Patch 1 Patch Transdermal every 24 hours  lidocaine 1% (Preservative-free) Injectable 20 milliLiter(s) Local Injection once  meropenem  IVPB 500 milliGRAM(s) IV Intermittent every 12 hours  multivitamin 1 Tablet(s) Oral daily  Nephro-pito 1 Tablet(s) Oral at bedtime  pantoprazole    Tablet 40 milliGRAM(s) Oral before breakfast  QUEtiapine 25 milliGRAM(s) Oral at bedtime  sevelamer carbonate 800 milliGRAM(s) Oral every 8 hours  tamsulosin 0.4 milliGRAM(s) Oral at bedtime  traZODone 50 milliGRAM(s) Oral at bedtime    MEDICATIONS  (PRN):  acetaminophen     Tablet .. 650 milliGRAM(s) Oral every 6 hours PRN Temp greater or equal to 38C (100.4F), Mild Pain (1 - 3)  ondansetron Injectable 4 milliGRAM(s) IV Push every 8 hours PRN Nausea and/or Vomiting

## 2022-06-23 NOTE — DIETITIAN INITIAL EVALUATION ADULT - OTHER INFO
Pt with h/o ESRD/HD, Hx GIB 2/2 AVM, CAD, HTN, HLD, PPM, CHFpEF, PAD s/p L Fem Bypass with cryovein c/b acute graft rethrombosis s/p thrombectomy now s/p L AKA discharged 2 days ago from Vascular Service after SICU admission for Hemorrhagic Shock s/p L AKA on Augmentin for "FUO" discharged two days admitted for Recurrent Fevers and AMS r/o Pleural Effusion/Parapneumonic Effusions.

## 2022-06-23 NOTE — PROGRESS NOTE ADULT - TIME BILLING
Review of hospital charts, including PACS and labs, and office and outside records if applicable.  Discussion of plans with referring service, and adjusting appropriate antibiotics.    d/w patient's daughter Vanessa  d/w Dr. FABIAN Davenport
chart review, lab review, imaging review, notes review. Discussion with  and coordination of care with all relevant providers and consultants caring for patient. discussion with daughter Vanessa
Review of hospital charts, including PACS and labs, and office and outside records if applicable.  Discussion of plans with referring service, and adjusting appropriate antibiotics.    d/w patient's daughter Vanessa  regarding updates on clinical data.
Review of hospital charts, including PACS and labs, and office and outside records if applicable.  Discussion of plans with referring service, and adjusting appropriate antibiotics.    d/w patient's daughter Vanessa  regarding updates on clinical data.

## 2022-06-23 NOTE — PROGRESS NOTE ADULT - ASSESSMENT
1) ESRD on HD  2) MBD of renal dx  3) Anemia of renal dx  4) Vol HTN    -HD M,F outpatient  -Will plan for dialysis today 6/23/22 as family will not be able to take patient to HD center on Friday  -EPO ordered for next dialysis  -Will plan for 1.5L UF  -May need Albumin with HD

## 2022-06-23 NOTE — PROGRESS NOTE ADULT - ASSESSMENT
88M with PMHX ESRD/HD ([facility]), Hx GIB 2/2 AVM, CAD, HTN, HLD, PPM, CHFpEF, PAD s/p L Fem Bypass with cryovein c/b acute graft rethrombosis s/p thrombectomy now s/p L AKA discharged 2 days ago from Vascular Service after SICU admission for Hemorrhagic Shock s/p L AKA on Augmentin for "FUO" discharged two days admitted for Recurrent Fevers and AMS r/o Pleural Effusion/Parapneumonic Effusions.    #Sepsis secondary to PNA c/b Parapneumonic Effusions   Oxygen via NC  CXR +blunted costophrenic angles  CT Chest with b/l pleural effusions. - Needs repeat SCan in 4-6 weeks for b/l pulmonary nodules   Was on Zosyn - changed to Merrem by ID  Vancomycin 1g IV x1  COVID/RVP Negative  Repeat BCX NGTD so far  MRSA PCR Neg  Legionella/Strep PNA pending  CT Surgery recs appreciated - S/p Chest tube placement and now removal   Vascular Sx recs appreciated  ID recs appreciated   Repeat CT chest Stat  Repeat Blood cx, sputum cx    #PAD s/p L Fem Bypass with cryovein c/b acute graft rethrombosis s/p thrombectomy now s/p L AKA  Fentanyl Patch  Vascular Surgery recs appreciated    #Hx LLE DVT, Hx AFIB  Hemorrhagic Shock last admission on Eliquis  AC Held during last hospitalization    #ESRD/HD   Torsemide 20mg PO q24  Renal recs appreciated  Nephrovite Daily  Renal Diet + Sevelamer TID ACHS  unalbe to get HD on 6/20 for hypotension  Will monitor for now     #Hypotension/Bradycardia   /43 on admission.   250cc IVFB NSS given in ED  Hold Nifedipine 90mg qAM  Cardio recs appreciated  Started on midodrine    #HTN, HLD, CAD, CHFpEF  Torsemide 20mg PO q24  Hold Nifedipine 90mg PO q24  ASA 81mg PO q24  Atorvastatin 80mg PO q24  Imdur 30mg ER q24    #Delirium  Melatonin 3mg PO q24  Trazodone 50mg PO q24  Seroquel 25mg PO qHS      #Constipation  Hold Miralax and Senna given antibiotic-induced loose stool reported    Goals of Care  -DNR/DNI Confirmed. See MOLST Prior. Family wants all other medical and surgical options apart from CPR/Mechanical Ventilation. Palliative Consult for continuity.      Dispo: Now acute   Needs repeat CT     Spoke to patients daughter Vanessa over the phone.

## 2022-06-23 NOTE — DIETITIAN INITIAL EVALUATION ADULT - ORAL INTAKE PTA/DIET HISTORY
Pt currently sleeping; aware confusion per RN.  As per RN, pt consume minimal amount of food at meals and requires complete assistance.  Pt receiving Easy to chew diet per SLP recommendation (6/22).  Aware palliative care following for GOC.  RD to remain available.

## 2022-06-23 NOTE — DIETITIAN INITIAL EVALUATION ADULT - PERTINENT LABORATORY DATA
06-23    138  |  96<L>  |  36.5<H>  ----------------------------<  103<H>  4.5   |  27.0  |  4.23<H>    Ca    9.0      23 Jun 2022 07:01    TPro  6.1<L>  /  Alb  2.8<L>  /  TBili  0.4  /  DBili  x   /  AST  33  /  ALT  20  /  AlkPhos  299<H>  06-23  POCT Blood Glucose.: 93 mg/dL (06-23-22 @ 08:48)  A1C with Estimated Average Glucose Result: 5.0 % (06-04-22 @ 04:34)  A1C with Estimated Average Glucose Result: 5.0 % (07-14-21 @ 11:36)

## 2022-06-24 NOTE — PROGRESS NOTE ADULT - ASSESSMENT
This 88 y.o. M with PMHX ESRD/HD on Mondays and Fridays ([place]), Hx GIB 2/2 AVM, CAD, HTN, HLD, PPM, CHFpEF, PAD s/p L Fem Bypass with cryovein c/b acute graft rethrombosis s/p thrombectomy now s/p L AKA discharged 2 days ago from Vascular Service after SICU admission for Hemorrhagic Shock s/p L AKA on Augmentin for "FUO". Patient brought back to [facility] ER for recurrent fevers despite abx with Augmentin and delirium. CXR from prior hospitalization with blunted costophrenic angles.   COVID/RVP Negative . Pt evaluated by Vascular Surgery for L AKA in ER. CT Surgery consulted for effusion possible parapneumonic effusion. ID consulted. Discussed plan of care with daughter and grandson at bedside. DNR/DNI confirmed but want all other medical and surgical interventions.  (18 Jun 2022 23:40)    daughter at bedside.   recently discharged 6/16/22 per daughter, had low grade fever at that time.   at home progressively decline.  EMS was called, evaluated the patient, and brought to hospital.     fevers noted here.   CT scan showed:  1.  Bilateral pleural effusions (right greater than left).  2.  Complete passive atelectasis of the right lower lobe.  3.  Bilateral linear atelectasis.  4.  Groundglass opacities and septal thickening are of uncertain etiology.  5.  Bilateral pulmonary nodules, some which have a branching morphology   are uncertain etiology. A follow-up is recommended in 4-6 weeks to   evaluate for resolution/change        Impression  fevers  weakness  pleural effusions  ESRD on HD      Plan:  initial workup for fever negative     repeat fevers 101.9 on 6/23 at 4AM; and fever  102.1 (23 Jun 2022 20:00)  - repeat blood cultures  - check sputum cx if productive  - d/c zosyn    Still with fevers; workup in process  CONTINUE   Merrem 500mg Q12H      Repeat CT chest 6/23/22:  Right pleural effusion has decreased when compared to   previous exam.    Multiple very small nodules are present in the peripheral aspect of both   lungs. Follow-up CT scan is recommended in 3 months to ensure resolution.      s/p drainage of pleural effusion 6/19/22   cultures from pleural fluid, so far is negative   - s/p removal of Right chest tube 6/22/22        ESRD on HD  - renal following       will follow

## 2022-06-24 NOTE — PROGRESS NOTE ADULT - ASSESSMENT
88M with  ESRD on HD, anemia, CHF with preserved EF, HTN, CAD, AFIB, PVD s/p recent bypass, LLE DVT, recently admitted with gastrointestinal bleeding, hemorrhagic shock related to graft bleeding s/p ligation of bleeding CryoVein fem-AT bypass, s/p left AKA, now here with recurrent fevers.     #1 Fevers  - infectious disease following  - COVID/RVP negative   - CT A/P - left pleural effusion s/p chest tube  - cultures negative   - zosyn changed to meropenem 6/23   - CT chest as above   - unclear causes of fevers     #2 Pleural effusion  - s/p chest tube, CT surgery following  - culture negative   - CT chest as above     #3 ESRD  - HD as able per nephrology   - nephrology following    #4 S/P L AKA, LE pain   - PAD, failed femoral tibial bypass  - graft thrombosis  - L stump with ace bandage dry and intact  - vascular following   - continue fentanyl patch     #5 LLE DVT, afib  - no AC due to multiple bleeding episodes     #6 Encounter for palliative care  - discussion with Vanessa daughter today as above    88M with  ESRD on HD, anemia, CHF with preserved EF, HTN, CAD, AFIB, PVD s/p recent bypass, LLE DVT, recently admitted with gastrointestinal bleeding, hemorrhagic shock related to graft bleeding s/p ligation of bleeding CryoVein fem-AT bypass, s/p left AKA, now here with recurrent fevers.     #1 Fevers  - infectious disease following  - COVID/RVP negative   - CT A/P - left pleural effusion s/p chest tube  - cultures negative   - zosyn changed to meropenem 6/23   - CT chest as above   - unclear causes of fevers     #2 Pleural effusion  - s/p chest tube, CT surgery following  - culture negative   - CT chest as above     #3 ESRD  - HD as able per nephrology   - nephrology following    #4 S/P L AKA, LE pain   - PAD, failed femoral tibial bypass  - graft thrombosis  - L stump with ace bandage dry and intact  - vascular following   - continue fentanyl patch     #5 LLE DVT, afib  - no AC due to multiple bleeding episodes     #6 Encounter for palliative care  - discussion with Vanessa daughter today regarding high risk for readmission, likely continued fevers, unclear causes. I discussed with her to try to treat him at home as much as possible and not bring him back to the ER   - Alot of emotional support provided   - will sign off as goals are addressed and clear, DNR and DNI but want to continue all other interventions including dialysis

## 2022-06-24 NOTE — PROGRESS NOTE ADULT - ASSESSMENT
88M with PMHX ESRD/HD ([place]), Hx GIB 2/2 AVM, CAD, HTN, HLD, PPM, CHFpEF, PAD s/p L Fem Bypass with cryovein c/b acute graft rethrombosis s/p thrombectomy now s/p L AKA discharged 2 days ago from Vascular Service after SICU admission for Hemorrhagic Shock s/p L AKA on Augmentin for "FUO" discharged two days admitted for Recurrent Fevers and AMS r/o Pleural Effusion/Parapneumonic Effusions.    #Sepsis secondary to PNA c/b Parapneumonic Effusions   Oxygen via NC  CXR +blunted costophrenic angles  CT Chest with b/l pleural effusions. - Needs repeat SCan in 4-6 weeks for b/l pulmonary nodules   Was on Zosyn - changed to Merrem by ID  Vancomycin 1g IV x1  COVID/RVP Negative  Repeat BCX NGTD so far  MRSA PCR Neg  CT Surgery recs appreciated - S/p Chest tube placement and now removal   Vascular Sx recs appreciated  ID recs appreciated   Repeat CT chest reviewed  Repeat Blood cx, sputum cx    #PAD s/p L Fem Bypass with cryovein c/b acute graft rethrombosis s/p thrombectomy now s/p L AKA  Fentanyl Patch  Vascular Surgery recs appreciated    #Hx LLE DVT, Hx AFIB  Hemorrhagic Shock last admission on Eliquis  AC Held during last hospitalization    #ESRD/HD   Torsemide 20mg PO q24  Renal recs appreciated  Nephrovite Daily  Renal Diet + Sevelamer TID ACHS  HD per renal  Will monitor for now     #Hypotension/Bradycardia   /43 on admission.   250cc IVFB NSS given in ED  Hold Nifedipine 90mg qAM  Cardio recs appreciated  Started on midodrine    #HTN, HLD, CAD, CHFpEF  Torsemide 20mg PO q24  Hold Nifedipine 90mg PO q24  ASA 81mg PO q24  Atorvastatin 80mg PO q24  Imdur 30mg ER q24    #Delirium  Melatonin 3mg PO q24  Trazodone 50mg PO q24  Seroquel 25mg PO qHS      #Constipation  Hold Miralax and Senna given antibiotic-induced loose stool reported    Goals of Care  -DNR/DNI Confirmed. See MOLST Prior. Family wants all other medical and surgical options apart from CPR/Mechanical Ventilation. Palliative Consult for continuity.      Dispo: Now acute , C/w felix    Spoke to patients daughter Vanessa over the phone.

## 2022-06-24 NOTE — PROGRESS NOTE ADULT - SUBJECTIVE AND OBJECTIVE BOX
[facility]                                                INFECTIOUS DISEASES  =======================================================                               Jeet Bryan MD#  Mango Oliver MD*                                     Andrew Kaba MD*    Radha Short MD*            Diplomates American Board of Internal Medicine & Infectious Diseases                  # [place] Office - Appt - Tel  [phone] Fax [phone]                * [place] Office - Appt - Tel [phone] Fax [phone]                                  Hospital Consult line:  [phone]  =======================================================    [id number]  CHRISTIANO ELIZABETH   called back for fever, lethargy    interval CT scan w improvement of effusion. no new lung finding.   awake this AM  on cooling blanket this AM     I have personally reviewed the labs and data; pertinent labs and data are listed in this note; please see below.   =======================================================  Past Medical & Surgical Hx:  =====================  PAST MEDICAL & SURGICAL HISTORY:  DM (diabetes mellitus) - resolved  AV block, 1st degree  Arrhythmia  CAD (coronary artery disease)  HTN (hypertension)  VT (ventricular tachycardia)  RICHARDS (dyspnea on exertion)  Anemia  Risk factors for obstructive sleep apnea  ESRD on dialysis HD on Mondays and Fridays  Aortic stenosis - s/p valve replacement  GI bleeding due to ulcerated polyps and colonic AVMs  H/O circumcision at  age  65  H/O left knee surgery  H/O angioplasty 2013,  no  intervention  A-V fistula left arm 5/2017  H/O carotid endarterectomy Right  S/P TAVR (transcatheter aortic valve replacement)  History of loop recorder    Problem List:  ==========  HEALTH ISSUES - PROBLEM Dx:  Pleural effusion    Social Hx:  =======  no toxic habits currently    FAMILY HISTORY:  Family history of cancer (Grandparent)  Family history of lung cancer (Grandparent)  Family history of premature CAD  FH: type 2 diabetes    no significant family history of immunosuppressive disorders in mother or father   =======================================================    REVIEW OF SYSTEMS:  Limited due to medical condition      =======================================================  Allergies  Plavix (Hives)  Toprol-XL (Rash)      ======================================================  Physical Exam:  ============     General:  No acute distress. frail,  awake  Eye: Pupils are equal, round and reactive to light,    HENT: Normocephalic, Oral mucosa is dry, EDENTULOUS  Neck: Supple, No lymphadenopathy.  Respiratory: Lungs with decreased air entry at bases   Cardiovascular: Normal rate, Regular rhythm,  Left chest wall implanted cardiac device  RIGHT sided HD catheter.   Gastrointestinal: Soft, Non-tender, Non-distended, Normal bowel sounds.  Genitourinary: No costovertebral angle tenderness.  Lymphatics: No lymphadenopathy neck,   Musculoskeletal:  LEFT BKA  Integumentary: No rash.  Neurologic: awake    =======================================================  Vitals:  ============  T(F): 100.3 (24 Jun 2022 06:00), Max: 102.1 (23 Jun 2022 20:00)  HR: 61 (24 Jun 2022 05:31)  BP: 182/58 (24 Jun 2022 05:31)  RR: 19 (24 Jun 2022 05:31)  SpO2: 98% (24 Jun 2022 05:31) (98% - 100%)  temp max in last 48H T(F): , Max: 102.1 (06-23-22 @ 20:00)    =======================================================  Current Antibiotics:  meropenem  IVPB 500 milliGRAM(s) IV Intermittent every 12 hours    Other medications:  aspirin  chewable 81 milliGRAM(s) Oral daily  atorvastatin 80 milliGRAM(s) Oral at bedtime  chlorhexidine 2% Cloths 1 Application(s) Topical daily  fentaNYL   Patch  12 MICROgram(s)/Hr 1 Patch Transdermal every 72 hours  heparin   Injectable 5000 Unit(s) SubCutaneous every 12 hours  isosorbide   mononitrate ER Tablet (IMDUR) 30 milliGRAM(s) Oral daily  lidocaine   4% Patch 1 Patch Transdermal every 24 hours  lidocaine 1% (Preservative-free) Injectable 20 milliLiter(s) Local Injection once  multivitamin 1 Tablet(s) Oral daily  Nephro-pito 1 Tablet(s) Oral at bedtime  pantoprazole    Tablet 40 milliGRAM(s) Oral before breakfast  QUEtiapine 25 milliGRAM(s) Oral at bedtime  sevelamer carbonate 800 milliGRAM(s) Oral every 8 hours  tamsulosin 0.4 milliGRAM(s) Oral at bedtime  traZODone 50 milliGRAM(s) Oral at bedtime    =======================================================  Labs:                        11.3   6.50  )-----------( 326      ( 24 Jun 2022 07:20 )             37.4     06-24    140  |  98  |  30.5<H>  ----------------------------<  130<H>  4.5   |  25.0  |  3.56<H>    Ca    9.6      24 Jun 2022 07:20    TPro  7.2  /  Alb  3.1<L>  /  TBili  0.5  /  DBili  x   /  AST  31  /  ALT  20  /  AlkPhos  311<H>  06-24      Culture - Fungal, Body Fluid (collected 06-19-22 @ 18:21)  Source: .Body Fluid Pleural Fluid    Culture - Body Fluid with Gram Stain (collected 06-19-22 @ 18:21)  Source: .Body Fluid Pleural Fluid  Gram Stain (06-19-22 @ 23:08):    polymorphonuclear leukocytes seen    No organisms seen    by cytocentrifuge    Culture - Blood (collected 06-19-22 @ 03:11)  Source: .Blood Blood-Peripheral  Final Report (06-24-22 @ 04:00):    No Growth Final    Culture - Blood (collected 06-19-22 @ 03:08)  Source: .Blood Blood-Peripheral  Final Report (06-24-22 @ 04:00):    No Growth Final    Culture - Blood (collected 06-14-22 @ 05:44)  Source: .Blood Blood  Final Report (06-19-22 @ 06:00):    No Growth Final    Culture - Blood (collected 06-14-22 @ 05:44)  Source: .Blood Blood  Final Report (06-19-22 @ 06:00):    No Growth Final         Procalcitonin, Serum: 1.37 ng/mL (06-19-22 @ 07:37)    SARS-CoV-2: NotDetec (06-18-22 @ 17:24)  COVID-19 PCR: NotDetec (06-13-22 @ 06:58)  COVID-19 PCR: NotDetec (06-03-22 @ 18:58)  COVID-19 PCR: NotDetec (05-31-22 @ 04:59)      =======================================================  < from: CT Chest No Cont (06.23.22 @ 16:39) >    ACC: [id number] EXAM:  CT CHEST                          PROCEDURE DATE:  06/23/2022          INTERPRETATION:  Clinical information: Fever. Exam is compared to   previous study of 6/18/2022.    CT scan of the chest was obtained without administrationof intravenous   contrast.    No hilar and or mediastinal adenopathy is noted.    Heart is enlarged in size. Calcification the coronary arteries is noted.   Patient is status post TAVR. No pericardial effusion is noted.   Right-sided venous catheter is noted in place.    No endobronchial lesions are noted. Multiple less than 0.4 cm nodules are   present in the peripheral aspect of both lungs. Linear opacities   representing atelectasis are noted within both lungs. Small bilateral   pleural effusions are noted. The right pleural effusion has decreased   when compared to previous exam.    Below the diaphragm, visualized portions of the abdomen demonstrate   cholelithiasis and ascites. Low-attenuation lesions in both kidneys are   too small to be adequately characterized on this exam.    Degenerative changes of the spine are noted.    IMPRESSION: Right pleural effusion has decreased when compared to   previous exam.    Multiple very small nodules are present in the peripheral aspect of both   lungs. Follow-up CT scan is recommended in 3 months to ensure resolution.    --- End of Report ---           BRYAN RUSSO MD; Attending Radiologist  This document has been electronically signed. Jun 23 2022  5:07PM    < end of copied text >

## 2022-06-24 NOTE — PROGRESS NOTE ADULT - SUBJECTIVE AND OBJECTIVE BOX
OVERNIGHT EVENTS: no acute issues, s/p CT scan yesterday, afebrile today     Present Symptoms:     Dyspnea: none   Nausea/Vomiting: No  Anxiety:  No  Depression: unable   Fatigue: No  Loss of appetite: unable   Constipation: none     Pain: none             Character-            Duration-            Effect-            Factors-            Frequency-            Location-            Severity-    Pain AD Score:  http://[email or web address]/cog/painad.pdf (press ctrl + left click to view)    Review of Systems: Reviewed                     Negative: no chest pain                     All others negative    MEDICATIONS  (STANDING):  aspirin  chewable 81 milliGRAM(s) Oral daily  atorvastatin 80 milliGRAM(s) Oral at bedtime  chlorhexidine 2% Cloths 1 Application(s) Topical daily  fentaNYL   Patch  12 MICROgram(s)/Hr 1 Patch Transdermal every 72 hours  heparin   Injectable 5000 Unit(s) SubCutaneous every 12 hours  isosorbide   mononitrate ER Tablet (IMDUR) 30 milliGRAM(s) Oral daily  lidocaine   4% Patch 1 Patch Transdermal every 24 hours  lidocaine 1% (Preservative-free) Injectable 20 milliLiter(s) Local Injection once  meropenem  IVPB 500 milliGRAM(s) IV Intermittent every 12 hours  multivitamin 1 Tablet(s) Oral daily  Nephro-pito 1 Tablet(s) Oral at bedtime  pantoprazole    Tablet 40 milliGRAM(s) Oral before breakfast  QUEtiapine 25 milliGRAM(s) Oral at bedtime  sevelamer carbonate 800 milliGRAM(s) Oral every 8 hours  tamsulosin 0.4 milliGRAM(s) Oral at bedtime  traZODone 50 milliGRAM(s) Oral at bedtime    MEDICATIONS  (PRN):  acetaminophen     Tablet .. 650 milliGRAM(s) Oral every 6 hours PRN Temp greater or equal to 38C (100.4F), Mild Pain (1 - 3)  ondansetron Injectable 4 milliGRAM(s) IV Push every 8 hours PRN Nausea and/or Vomiting    PHYSICAL EXAM:    Vital Signs Last 24 Hrs  T(C): 37.4 (24 Jun 2022 10:02), Max: 38.9 (23 Jun 2022 20:00)  T(F): 99.3 (24 Jun 2022 10:02), Max: 102.1 (23 Jun 2022 20:00)  HR: 70 (24 Jun 2022 10:02) (51 - 70)  BP: 153/67 (24 Jun 2022 10:02) (125/85 - 182/58)  BP(mean): --  RR: 18 (24 Jun 2022 10:02) (18 - 19)  SpO2: 98% (24 Jun 2022 10:02) (98% - 100%)    General: lethargic but wakes up to loud stimuli     Karnofsky:  20 %    HEENT: normal     Lungs: comfortable    CV: normal      GI: normal      : oliguria/anuria     MSK: bedbound/wheelchair bound. s/p left AKA    Skin: no rash    LABS:                      11.3   6.50  )-----------( 326      ( 24 Jun 2022 07:20 )             37.4     06-24    140  |  98  |  30.5<H>  ----------------------------<  130<H>  4.5   |  25.0  |  3.56<H>    Ca    9.6      24 Jun 2022 07:20    TPro  7.2  /  Alb  3.1<L>  /  TBili  0.5  /  DBili  x   /  AST  31  /  ALT  20  /  AlkPhos  311<H>  06-24    I&O's Summary    23 Jun 2022 07:01  -  24 Jun 2022 07:00  --------------------------------------------------------  IN: 1200 mL / OUT: 2200 mL / NET: -1000 mL    RADIOLOGY & ADDITIONAL STUDIES:    < from: CT Chest No Cont (06.23.22 @ 16:39) >  IMPRESSION: Right pleural effusion has decreased when compared to   previous exam.    Multiple very small nodules are present in the peripheral aspect of both   lungs. Follow-up CT scan is recommended in 3 months to ensure resolution.    --- End of Report ---    < end of copied text >    ADVANCE DIRECTIVES/TREATMENT PREFERENCES:  DNR/DNI

## 2022-06-24 NOTE — PROGRESS NOTE ADULT - SUBJECTIVE AND OBJECTIVE BOX
Hospitalist Daily Progress Note    Chief Complaint:  Patient is a 88y old  Male who presents with a chief complaint of Recurrent Fevers (24 Jun 2022 11:11)      SUBJECTIVE / OVERNIGHT EVENTS:  Patient was seen and examined at bedside. Yelling randomly. Disoriented this AM. No complaints.   Patient denies chest pain, SOB, abd pain, N/V, fever, chills, dysuria or any other complaints. All remainder ROS negative.     MEDICATIONS  (STANDING):  aspirin  chewable 81 milliGRAM(s) Oral daily  atorvastatin 80 milliGRAM(s) Oral at bedtime  chlorhexidine 2% Cloths 1 Application(s) Topical daily  fentaNYL   Patch  12 MICROgram(s)/Hr 1 Patch Transdermal every 72 hours  heparin   Injectable 5000 Unit(s) SubCutaneous every 12 hours  isosorbide   mononitrate ER Tablet (IMDUR) 30 milliGRAM(s) Oral daily  lidocaine   4% Patch 1 Patch Transdermal every 24 hours  lidocaine 1% (Preservative-free) Injectable 20 milliLiter(s) Local Injection once  meropenem  IVPB 500 milliGRAM(s) IV Intermittent every 12 hours  multivitamin 1 Tablet(s) Oral daily  Nephro-pito 1 Tablet(s) Oral at bedtime  pantoprazole    Tablet 40 milliGRAM(s) Oral before breakfast  QUEtiapine 25 milliGRAM(s) Oral at bedtime  sevelamer carbonate 800 milliGRAM(s) Oral every 8 hours  tamsulosin 0.4 milliGRAM(s) Oral at bedtime  traZODone 50 milliGRAM(s) Oral at bedtime    MEDICATIONS  (PRN):  acetaminophen     Tablet .. 650 milliGRAM(s) Oral every 6 hours PRN Temp greater or equal to 38C (100.4F), Mild Pain (1 - 3)  ondansetron Injectable 4 milliGRAM(s) IV Push every 8 hours PRN Nausea and/or Vomiting        I&O's Summary    23 Jun 2022 07:01  -  24 Jun 2022 07:00  --------------------------------------------------------  IN: 1200 mL / OUT: 2200 mL / NET: -1000 mL        PHYSICAL EXAM:  Vital Signs Last 24 Hrs  T(C): 37.4 (24 Jun 2022 10:02), Max: 38.9 (23 Jun 2022 20:00)  T(F): 99.3 (24 Jun 2022 10:02), Max: 102.1 (23 Jun 2022 20:00)  HR: 70 (24 Jun 2022 10:02) (51 - 70)  BP: 153/67 (24 Jun 2022 10:02) (125/85 - 182/58)  BP(mean): --  RR: 18 (24 Jun 2022 10:02) (18 - 19)  SpO2: 98% (24 Jun 2022 10:02) (98% - 100%)    Constitutional: NAD, Resting, lethargic   ENT: Supple, No JVD  Lungs: clear b/l  Cardio: RRR, S1/S2, No murmur  Abdomen: Soft, Nontender, Nondistended; Bowel sounds present  Extremities: No calf tenderness, Left AKA   Musculoskeletal:   No clubbing or cyanosis of digits; no joint swelling or tenderness to palpation  Psych: Calm cooperative right now  Neuro: Awakens, yells randomly  Skin: No rashes; no palpable lesions       LABS:                        11.3   6.50  )-----------( 326      ( 24 Jun 2022 07:20 )             37.4     06-24    140  |  98  |  30.5<H>  ----------------------------<  130<H>  4.5   |  25.0  |  3.56<H>    Ca    9.6      24 Jun 2022 07:20    TPro  7.2  /  Alb  3.1<L>  /  TBili  0.5  /  DBili  x   /  AST  31  /  ALT  20  /  AlkPhos  311<H>  06-24              CAPILLARY BLOOD GLUCOSE            RADIOLOGY REVIEWED

## 2022-06-25 NOTE — PROGRESS NOTE ADULT - SUBJECTIVE AND OBJECTIVE BOX
[facility]                                                INFECTIOUS DISEASES  =======================================================                               Jeet Bryan MD#  Mango Oliver MD*                                     Andrew Kaba MD*    Radha Short MD*            Diplomates American Board of Internal Medicine & Infectious Diseases                  # [place] Office - Appt - Tel  [phone] Fax [phone]                * [place] Office - Appt - Tel [phone] Fax [phone]                                  Hospital Consult line:  [phone]  =======================================================    [facility]-[id number]  CHRISTIANO ELIZABETH   called back for fever, lethargy    interval CT scan w improvement of effusion. no new lung finding.   remains febrile     I have personally reviewed the labs and data; pertinent labs and data are listed in this note; please see below.   =======================================================  Past Medical & Surgical Hx:  =====================  PAST MEDICAL & SURGICAL HISTORY:  DM (diabetes mellitus) - resolved  AV block, 1st degree  Arrhythmia  CAD (coronary artery disease)  HTN (hypertension)  VT (ventricular tachycardia)  RICHARDS (dyspnea on exertion)  Anemia  Risk factors for obstructive sleep apnea  ESRD on dialysis HD on Mondays and Fridays  Aortic stenosis - s/p valve replacement  GI bleeding due to ulcerated polyps and colonic AVMs  H/O circumcision at  age  65  H/O left knee surgery  H/O angioplasty 2013,  no  intervention  A-V fistula left arm 5/2017  H/O carotid endarterectomy Right  S/P TAVR (transcatheter aortic valve replacement)  History of loop recorder    Problem List:  ==========  HEALTH ISSUES - PROBLEM Dx:  Pleural effusion    Social Hx:  =======  no toxic habits currently    FAMILY HISTORY:  Family history of cancer (Grandparent)  Family history of lung cancer (Grandparent)  Family history of premature CAD  FH: type 2 diabetes    no significant family history of immunosuppressive disorders in mother or father   =======================================================    REVIEW OF SYSTEMS:  Limited due to medical condition      =======================================================  Allergies  Plavix (Hives)  Toprol-XL (Rash)      ======================================================  Physical Exam:  ============     General:  No acute distress. frail,  awake  Eye: Pupils are equal, round and reactive to light,    HENT: Normocephalic, Oral mucosa is dry, EDENTULOUS  Neck: Supple, No lymphadenopathy.  Respiratory: Lungs with decreased air entry at bases   Cardiovascular: Normal rate, Regular rhythm,  Left chest wall implanted cardiac device  RIGHT sided HD catheter.   Gastrointestinal: Soft, Non-tender, Non-distended, Normal bowel sounds.  Genitourinary: No costovertebral angle tenderness.  Lymphatics: No lymphadenopathy neck,   Musculoskeletal:  LEFT BKA staples intact  Integumentary: No rash.  Neurologic: awake    =======================================================  Vitals:  ============  Vital Signs Last 24 Hrs  T(C): 36.7 (25 Jun 2022 16:03), Max: 38.3 (25 Jun 2022 06:02)  T(F): 98 (25 Jun 2022 16:03), Max: 101 (25 Jun 2022 06:02)  HR: 59 (25 Jun 2022 16:03) (51 - 64)  BP: 158/70 (25 Jun 2022 16:03) (145/58 - 165/63)  BP(mean): --  RR: 18 (25 Jun 2022 16:03) (18 - 20)  SpO2: 100% (25 Jun 2022 16:03) (94% - 100%)    =======================================================  Current Antibiotics:  meropenem  IVPB 500 milliGRAM(s) IV Intermittent every 12 hours    Other medications:  aspirin  chewable 81 milliGRAM(s) Oral daily  atorvastatin 80 milliGRAM(s) Oral at bedtime  chlorhexidine 2% Cloths 1 Application(s) Topical daily  fentaNYL   Patch  12 MICROgram(s)/Hr 1 Patch Transdermal every 72 hours  heparin   Injectable 5000 Unit(s) SubCutaneous every 12 hours  isosorbide   mononitrate ER Tablet (IMDUR) 30 milliGRAM(s) Oral daily  lidocaine   4% Patch 1 Patch Transdermal every 24 hours  lidocaine 1% (Preservative-free) Injectable 20 milliLiter(s) Local Injection once  multivitamin 1 Tablet(s) Oral daily  Nephro-pito 1 Tablet(s) Oral at bedtime  pantoprazole    Tablet 40 milliGRAM(s) Oral before breakfast  QUEtiapine 25 milliGRAM(s) Oral at bedtime  sevelamer carbonate 800 milliGRAM(s) Oral every 8 hours  tamsulosin 0.4 milliGRAM(s) Oral at bedtime  traZODone 50 milliGRAM(s) Oral at bedtime    =======================================================  Labs:                                   9.5    8.50  )-----------( 326      ( 25 Jun 2022 07:19 )             31.1       06-25    142  |  100  |  52.5<H>  ----------------------------<  168<H>  4.9   |  26.0  |  4.76<H>    Ca    9.7      25 Jun 2022 07:19    TPro  6.4<L>  /  Alb  3.0<L>  /  TBili  0.4  /  DBili  x   /  AST  32  /  ALT  23  /  AlkPhos  273<H>  06-25                            CAPILLARY BLOOD GLUCOSE                Culture - Fungal, Body Fluid (collected 06-19-22 @ 18:21)  Source: .Body Fluid Pleural Fluid    Culture - Body Fluid with Gram Stain (collected 06-19-22 @ 18:21)  Source: .Body Fluid Pleural Fluid  Gram Stain (06-19-22 @ 23:08):    polymorphonuclear leukocytes seen    No organisms seen    by cytocentrifuge    Culture - Blood (collected 06-19-22 @ 03:11)  Source: .Blood Blood-Peripheral  Final Report (06-24-22 @ 04:00):    No Growth Final    Culture - Blood (collected 06-19-22 @ 03:08)  Source: .Blood Blood-Peripheral  Final Report (06-24-22 @ 04:00):    No Growth Final    Culture - Blood (collected 06-14-22 @ 05:44)  Source: .Blood Blood  Final Report (06-19-22 @ 06:00):    No Growth Final    Culture - Blood (collected 06-14-22 @ 05:44)  Source: .Blood Blood  Final Report (06-19-22 @ 06:00):    No Growth Final         Procalcitonin, Serum: 1.37 ng/mL (06-19-22 @ 07:37)    SARS-CoV-2: NotDetec (06-18-22 @ 17:24)  COVID-19 PCR: NotDetec (06-13-22 @ 06:58)  COVID-19 PCR: NotDetec (06-03-22 @ 18:58)  COVID-19 PCR: NotDetec (05-31-22 @ 04:59)      =======================================================  < from: CT Chest No Cont (06.23.22 @ 16:39) >    ACC: [id number] EXAM:  CT CHEST                          PROCEDURE DATE:  06/23/2022          INTERPRETATION:  Clinical information: Fever. Exam is compared to   previous study of 6/18/2022.    CT scan of the chest was obtained without administrationof intravenous   contrast.    No hilar and or mediastinal adenopathy is noted.    Heart is enlarged in size. Calcification the coronary arteries is noted.   Patient is status post TAVR. No pericardial effusion is noted.   Right-sided venous catheter is noted in place.    No endobronchial lesions are noted. Multiple less than 0.4 cm nodules are   present in the peripheral aspect of both lungs. Linear opacities   representing atelectasis are noted within both lungs. Small bilateral   pleural effusions are noted. The right pleural effusion has decreased   when compared to previous exam.    Below the diaphragm, visualized portions of the abdomen demonstrate   cholelithiasis and ascites. Low-attenuation lesions in both kidneys are   too small to be adequately characterized on this exam.    Degenerative changes of the spine are noted.    IMPRESSION: Right pleural effusion has decreased when compared to   previous exam.    Multiple very small nodules are present in the peripheral aspect of both   lungs. Follow-up CT scan is recommended in 3 months to ensure resolution.    --- End of Report ---           BRYAN RUSSO MD; Attending Radiologist  This document has been electronically signed. Jun 23 2022  5:07PM    < end of copied text >

## 2022-06-25 NOTE — PROGRESS NOTE ADULT - ASSESSMENT
Patient is a 88 year old male with PMHX ESRD/HD ([place]), Hx GIB 2/2 AVM, CAD, HTN, HLD, PPM, CHFpEF, PAD s/p L Fem Bypass with cryovein c/b acute graft rethrombosis s/p thrombectomy now s/p L AKA discharged 2 days ago from Vascular Service after SICU admission for Hemorrhagic Shock s/p L AKA on Augmentin for "FUO" discharged two days admitted for Recurrent Fevers and AMS r/o Pleural Effusion/Parapneumonic Effusions.    #Acute Hypoxic Respiratory Failure due to Pleural Effusions  -remains hypoxic on nasal canula  -s/p right chest tube which was removed on 6/23  -repeat CT chest with decreased size of effusions  -CXR +blunted costophrenic angles  -Repeat CT chest as outpatient for pulmonary nodule surveillance  -pleural fluid culture and cytology negative  -ultrafiltration and torsemide to help facilitate fluid removal  -CT surgery recs appreciated    #Fevers; unclear etiology  -no clear radiographic evidence of PNA  -pleural fluid culture negative  -blood cultures negative  -check UA  -repeat blood cultures today due to fever of 101  -check RLE duplex to rule out DVT  -check CT abd/pelvis  -repeat COVID PCR negative; check RVP  -continue empiric merrem  -no clinical concerns meningitis therefore LP deferred   -skin in tact and stump without concerns for infection  -no concerns for line sepsis or endocarditis    -check procal; WBC normal  -monitor fever curve  -ID recs appreciated    #PAD s/p L Fem Bypass with cryovein c/b acute graft rethrombosis s/p thrombectomy now s/p L AKA  stump site clean without acute concerns for infection  Continue aspirin and statin  Analgesia as needed  Vascular Surgery recs appreciated; will recall on Monday to remove staples    #Hx LLE DVT  Eliquis Held during last hospitalization due to hemorrhagic shock  RLE duplex ordered to exclude new DVT given fevers of unclear origin  continue DVT ppx    #ESRD on HD (TTS)  Uneventful HD today; UF goal per renal  Midodrine to assist with UF  Continue torsemide  Continue Nephrovite daily  Continue Sevelamer TID with meals  Strict I/os, daily weights  Avoid nephrotoxic agents and renally dose medications for eGFR < 10  Renal recs aprpeciated  Torsemide 20mg PO q24  Renal recs appreciated    #Chronic Diastolic Heart Failure  Appears euvolemic; UF goal per renal  Continue Torsemide  Continue Imdur  strict I/os, daily weights    #Delirium likely in the setting of fevers and hospital confinement   No meningeal signs or clinical concerns for meningitis as discussed with ID  CT head deferred per family's request since mentation fluctuation   Continue Melatonin 3mg, Trazodone 50 mg and Seroquel 25mg   Will request behavioral health evaluation for further adjustment of medications if delirium does not improve    # CAD  -continue aspirin and statin    #HLD  -continue statin    #HTN  -BP stable on midodrine; wean as tolerated  -continue torsemide and imdur  -low sodium diet    #Constipation  - resolved    #BPH  -continue flomax    DVT ppx - Heparin SC    Dispo: Remains acute; fever work up in process. CT abdomen and RLE duplex pending.    Plan discussed with patient's daughter, RN, Dr. Short   Patient is a 88 year old male with PMHX ESRD/HD ([place]), Hx GIB 2/2 AVM, CAD, HTN, HLD, PPM, CHFpEF, PAD s/p L Fem Bypass with cryovein c/b acute graft rethrombosis s/p thrombectomy now s/p L AKA discharged 2 days ago from Vascular Service after SICU admission for Hemorrhagic Shock s/p L AKA on Augmentin for "FUO" discharged two days admitted for Recurrent Fevers and AMS r/o Pleural Effusion/Parapneumonic Effusions.    #Acute Hypoxic Respiratory Failure due to Pleural Effusions  -remains hypoxic on nasal canula  -s/p right chest tube which was removed on 6/23  -repeat CT chest with decreased size of effusions  -CXR +blunted costophrenic angles  -Repeat CT chest as outpatient for pulmonary nodule surveillance  -pleural fluid culture and cytology negative  -ultrafiltration and torsemide to help facilitate fluid removal  -CT surgery recs appreciated    #Fevers; unclear etiology  -no clear radiographic evidence of PNA  -pleural fluid culture negative  -blood cultures negative  -check UA  -repeat blood cultures today due to fever of 101  -check RLE duplex to rule out DVT  -check CT abd/pelvis  -repeat COVID PCR negative; check RVP  -continue empiric merrem  -no clinical concerns meningitis therefore LP deferred   -skin in tact and stump without concerns for infection  -no concerns for line sepsis or endocarditis    -check procal; WBC normal  -monitor fever curve  -ID recs appreciated    #PAD s/p L Fem Bypass with cryovein c/b acute graft rethrombosis s/p thrombectomy now s/p L AKA  stump site clean without acute concerns for infection  Continue aspirin and statin  Analgesia as needed  Vascular Surgery recs appreciated; will recall on Monday to remove staples    #Hx LLE DVT  Eliquis Held during last hospitalization due to hemorrhagic shock  RLE duplex ordered to exclude new DVT given fevers of unclear origin  continue DVT ppx    #ESRD on HD (TTS)  Uneventful HD today; UF goal per renal  Midodrine to assist with UF  Continue torsemide  Continue Nephrovite daily  Continue Sevelamer TID with meals  Strict I/os, daily weights  Avoid nephrotoxic agents and renally dose medications for eGFR < 10  Renal recs aprpeciated  Torsemide 20mg PO q24  Renal recs appreciated    #Chronic Diastolic Heart Failure  Appears euvolemic; UF goal per renal  Continue Torsemide  Continue Imdur  strict I/os, daily weights    #Delirium likely in the setting of fevers and hospital confinement   No meningeal signs or clinical concerns for meningitis as discussed with ID  CT head deferred per family's request since mentation fluctuation   Continue Melatonin 3mg, Trazodone 50 mg and Seroquel 25mg   Will request behavioral health evaluation for further adjustment of medications if delirium does not improve    # CAD  -continue aspirin and statin    #HLD  -continue statin    #HTN  -BP stable on midodrine; wean as tolerated  -continue torsemide and imdur  -low sodium diet    #Constipation  - resolved    #BPH  -continue flomax    #Anemia of chronic kidney disease  -Hb noted  -check iron studies  -continue procrit  -monitor CBC    DVT ppx - Heparin SC    Dispo: Remains acute; fever work up in process. CT abdomen and RLE duplex pending.    Plan discussed with patient's daughter, RN, Dr. Short

## 2022-06-25 NOTE — PROGRESS NOTE ADULT - ASSESSMENT
This 88 y.o. M with PMHX ESRD/HD on Mondays and Fridays ([place]), Hx GIB 2/2 AVM, CAD, HTN, HLD, PPM, CHFpEF, PAD s/p L Fem Bypass with cryovein c/b acute graft rethrombosis s/p thrombectomy now s/p L AKA discharged 2 days ago from Vascular Service after SICU admission for Hemorrhagic Shock s/p L AKA on Augmentin for "FUO". Patient brought back to [facility] ER for recurrent fevers despite abx with Augmentin and delirium. CXR from prior hospitalization with blunted costophrenic angles.   COVID/RVP Negative . Pt evaluated by Vascular Surgery for L AKA in ER. CT Surgery consulted for effusion possible parapneumonic effusion. ID consulted. Discussed plan of care with daughter and grandson at bedside. DNR/DNI confirmed but want all other medical and surgical interventions.  (18 Jun 2022 23:40)    daughter at bedside.   recently discharged 6/16/22 per daughter, had low grade fever at that time.   at home progressively decline.  EMS was called, evaluated the patient, and brought to hospital.     fevers noted here.   CT scan showed:  1.  Bilateral pleural effusions (right greater than left).  2.  Complete passive atelectasis of the right lower lobe.  3.  Bilateral linear atelectasis.  4.  Groundglass opacities and septal thickening are of uncertain etiology.  5.  Bilateral pulmonary nodules, some which have a branching morphology   are uncertain etiology. A follow-up is recommended in 4-6 weeks to   evaluate for resolution/change        Impression  fevers  weakness  pleural effusions  ESRD on HD      Plan:  initial workup for fever negative     repeat fevers 101.9 on 6/23 at 4AM; and fever  102.1 (23 Jun 2022 20:00)  - repeat blood cultures  - check sputum cx if productive      Still with fevers; workup in process-swallow eval ?aspiration, ct a/p, RVP, doppler RLE r/o DVT, repeat BCX  CONTINUE   Merrem 500mg Q12H      Repeat CT chest 6/23/22:  Right pleural effusion has decreased when compared to   previous exam.    Multiple very small nodules are present in the peripheral aspect of both   lungs. Follow-up CT scan is recommended in 3 months to ensure resolution.      s/p drainage of pleural effusion 6/19/22   cultures from pleural fluid, so far is negative   - s/p removal of Right chest tube 6/22/22        ESRD on HD  - renal following     d/w rn, dr wright  will follow

## 2022-06-25 NOTE — PROGRESS NOTE ADULT - SUBJECTIVE AND OBJECTIVE BOX
CHIEF COMPLAINT/INTERVAL HISTORY:    Patient is a 88y old  Male who presents with a chief complaint of Recurrent Fevers (25 Jun 2022 11:58)    SUBJECTIVE & OBJECTIVE: Pt seen and examined at bedside. No overnight events reported. Tmax 101.0 this morning; no clear source. Patient seen in HD which was proceeding uneventfully. ID recalled - recommended RVP, CT abd/pelvis and RLE duplex to work up fever.    ROS: Unobtainable due to delirium     ICU Vital Signs Last 24 Hrs  T(C): 36.7 (25 Jun 2022 16:03), Max: 38.3 (25 Jun 2022 06:02)  T(F): 98 (25 Jun 2022 16:03), Max: 101 (25 Jun 2022 06:02)  HR: 59 (25 Jun 2022 16:03) (51 - 64)  BP: 158/70 (25 Jun 2022 16:03) (145/58 - 165/63)  RR: 18 (25 Jun 2022 16:03) (18 - 20)  SpO2: 100% (25 Jun 2022 16:03) (94% - 100%)    MEDICATIONS  (STANDING):  aspirin  chewable 81 milliGRAM(s) Oral daily  atorvastatin 80 milliGRAM(s) Oral at bedtime  chlorhexidine 2% Cloths 1 Application(s) Topical daily  epoetin juan-epbx (RETACRIT) Injectable 69012 Unit(s) IV Push <User Schedule>  fentaNYL   Patch  12 MICROgram(s)/Hr 1 Patch Transdermal every 72 hours  heparin   Injectable 5000 Unit(s) SubCutaneous every 12 hours  isosorbide   mononitrate ER Tablet (IMDUR) 30 milliGRAM(s) Oral daily  lidocaine   4% Patch 1 Patch Transdermal every 24 hours  lidocaine 1% (Preservative-free) Injectable 20 milliLiter(s) Local Injection once  meropenem  IVPB 500 milliGRAM(s) IV Intermittent every 24 hours  multivitamin 1 Tablet(s) Oral daily  Nephro-pito 1 Tablet(s) Oral at bedtime  pantoprazole    Tablet 40 milliGRAM(s) Oral before breakfast  QUEtiapine 25 milliGRAM(s) Oral at bedtime  sevelamer carbonate 800 milliGRAM(s) Oral every 8 hours  tamsulosin 0.4 milliGRAM(s) Oral at bedtime  traZODone 50 milliGRAM(s) Oral at bedtime    MEDICATIONS  (PRN):  acetaminophen     Tablet .. 650 milliGRAM(s) Oral every 6 hours PRN Temp greater or equal to 38C (100.4F), Mild Pain (1 - 3)  ondansetron Injectable 4 milliGRAM(s) IV Push every 8 hours PRN Nausea and/or Vomiting      LABS:                        9.5    8.50  )-----------( 326      ( 25 Jun 2022 07:19 )             31.1     06-25    142  |  100  |  52.5<H>  ----------------------------<  168<H>  4.9   |  26.0  |  4.76<H>    Ca    9.7      25 Jun 2022 07:19    TPro  6.4<L>  /  Alb  3.0<L>  /  TBili  0.4  /  DBili  x   /  AST  32  /  ALT  23  /  AlkPhos  273<H>  06-25      PHYSICAL EXAM:    GENERAL: elderly male, laying in bed, drowsy, not in acute distress  HEAD:  Atraumatic, Normocephalic  EYES: EOMI, PERRLA, conjunctiva and sclera clear  ENMT: Moist mucous membranes  NECK: Supple   NERVOUS SYSTEM:  Alert & Oriented X2, intermittent drowsiness  CHEST/LUNG: coarse breath sounds  HEART: Regular rate and rhythm; + S1/S2  ABDOMEN: Soft, Nontender, Nondistended; Bowel sounds present  EXTREMITIES:  no RLE edema, left AKA stump with staples in tact and no wound dehiscence

## 2022-06-26 NOTE — PROGRESS NOTE ADULT - SUBJECTIVE AND OBJECTIVE BOX
[facility]                                                INFECTIOUS DISEASES  =======================================================                               Jeet Bryan MD#  Mango Oliver MD*                                     Andrew Kaba MD*    Radha Short MD*            Diplomates American Board of Internal Medicine & Infectious Diseases                  # [place] Office - Appt - Tel  [phone] Fax [phone]                * [place] Office - Appt - Tel [phone] Fax [phone]                                  Hospital Consult line:  [phone]  =======================================================      [id number]  CHRISTIANO ELIZABETH   follow up for: fever  febrile again last night  pt is alert and awake denies complaints  patient seen and examined.       I have personally reviewed the labs and data; pertinent labs and data are listed in this note; please see below.   ===================================================  REVIEW OF SYSTEMS:  CONSTITUTIONAL:  No Fever or chills  HEENT:  No diplopia or blurred vision.  No earache, sore throat or runny nose.  CARDIOVASCULAR:  No pressure, squeezing, strangling, tightness, heaviness or aching about the chest, neck, axilla or epigastrium.  RESPIRATORY:  No cough, shortness of breath  GASTROINTESTINAL:  No nausea, vomiting or diarrhea.  GENITOURINARY:  No dysuria, frequency or urgency. No Blood in urine  MUSCULOSKELETAL:  no joint aches, no muscle pain  SKIN:  No change in skin, hair or nails.  NEUROLOGIC:  No Headaches, seizures or weakness.  PSYCHIATRIC:  No disorder of thought or mood.  ENDOCRINE:  No heat or cold intolerance  HEMATOLOGICAL:  No easy bruising or bleeding.    =======================================================  Allergies    Plavix (Hives)  Toprol-XL (Rash)    Intolerances    Antibiotics:  meropenem  IVPB 500 milliGRAM(s) IV Intermittent every 24 hours    Other medications:  aspirin  chewable 81 milliGRAM(s) Oral daily  atorvastatin 80 milliGRAM(s) Oral at bedtime  chlorhexidine 2% Cloths 1 Application(s) Topical daily  epoetin juan-epbx (RETACRIT) Injectable 56373 Unit(s) IV Push <User Schedule>  heparin   Injectable 5000 Unit(s) SubCutaneous every 12 hours  isosorbide   mononitrate ER Tablet (IMDUR) 30 milliGRAM(s) Oral daily  lidocaine   4% Patch 1 Patch Transdermal every 24 hours  multivitamin 1 Tablet(s) Oral daily  Nephro-pito 1 Tablet(s) Oral at bedtime  pantoprazole    Tablet 40 milliGRAM(s) Oral before breakfast  QUEtiapine 25 milliGRAM(s) Oral at bedtime  sevelamer carbonate 800 milliGRAM(s) Oral every 8 hours  tamsulosin 0.4 milliGRAM(s) Oral at bedtime  traZODone 50 milliGRAM(s) Oral at bedtime    ======================================================  Physical Exam:  ============  T(F): 97.6 (26 Jun 2022 09:45), Max: 101.2 (25 Jun 2022 20:43)  HR: 59 (26 Jun 2022 09:44)  BP: 159/74 (26 Jun 2022 09:44)  RR: 17 (26 Jun 2022 09:44)  SpO2: 96% (26 Jun 2022 09:44) (96% - 100%)  temp max in last 48H T(F): , Max: 101.2 (06-25-22 @ 20:43)    General:  No acute distress.  Eye: Pupils are equal, round and reactive to light, Extraocular movements are intact, Normal conjunctiva.  HENT: Normocephalic, Oral mucosa is moist, No pharyngeal erythema, No sinus tenderness.  Neck: Supple, No lymphadenopathy.  Respiratory: Lungs are clear to auscultation, Respirations are non-labored.  Cardiovascular: Normal rate, Regular rhythm,  rt chest permacath  Gastrointestinal: Soft, Non-tender, Non-distended, Normal bowel sounds.  Genitourinary: No costovertebral angle tenderness.  Lymphatics: No lymphadenopathy neck,   Musculoskeletal: Normal range of motion, Normal strength. aka stump staples intact  Integumentary: No rash. per RN no sacral wounds only redness  Neurologic: Alert, Oriented, No focal deficits, Cranial Nerves II-XII are grossly intact.  Psychiatric: Appropriate mood & affect.  =======================================================  Labs:                        9.9    6.51  )-----------( 349      ( 26 Jun 2022 06:56 )             32.9     06-26    136  |  97<L>  |  35.4<H>  ----------------------------<  102<H>  5.1   |  26.0  |  3.21<H>    Ca    9.3      26 Jun 2022 06:56  Phos  3.1     06-26  Mg     1.9     06-26    TPro  6.4<L>  /  Alb  3.0<L>  /  TBili  0.4  /  DBili  x   /  AST  32  /  ALT  23  /  AlkPhos  273<H>  06-25      Culture - Blood (collected 06-23-22 @ 11:16)  Source: .Blood Blood-Peripheral    Culture - Blood (collected 06-23-22 @ 11:16)  Source: .Blood Blood-Peripheral    Culture - Fungal, Body Fluid (collected 06-19-22 @ 18:21)  Source: .Body Fluid Pleural Fluid    Culture - Body Fluid with Gram Stain (collected 06-19-22 @ 18:21)  Source: .Body Fluid Pleural Fluid  Gram Stain (06-19-22 @ 23:08):    polymorphonuclear leukocytes seen    No organisms seen    by cytocentrifuge  Final Report (06-24-22 @ 17:14):    No growth at 5 days    Culture - Blood (collected 06-19-22 @ 03:11)  Source: .Blood Blood-Peripheral  Final Report (06-24-22 @ 04:00):    No Growth Final    Culture - Blood (collected 06-19-22 @ 03:08)  Source: .Blood Blood-Peripheral  Final Report (06-24-22 @ 04:00):    No Growth Final    Culture - Blood (collected 06-14-22 @ 05:44)  Source: .Blood Blood  Final Report (06-19-22 @ 06:00):    No Growth Final    Culture - Blood (collected 06-14-22 @ 05:44)  Source: .Blood Blood  Final Report (06-19-22 @ 06:00):    No Growth Final

## 2022-06-26 NOTE — PROGRESS NOTE ADULT - ASSESSMENT
This 88 y.o. M with PMHX ESRD/HD on Mondays and Fridays ([place]), Hx GIB 2/2 AVM, CAD, HTN, HLD, PPM, CHFpEF, PAD s/p L Fem Bypass with cryovein c/b acute graft rethrombosis s/p thrombectomy now s/p L AKA discharged 2 days ago from Vascular Service after SICU admission for Hemorrhagic Shock s/p L AKA on Augmentin for "FUO". Patient brought back to [facility] ER for recurrent fevers despite abx with Augmentin and delirium. CXR from prior hospitalization with blunted costophrenic angles.   COVID/RVP Negative . Pt evaluated by Vascular Surgery for L AKA in ER. CT Surgery consulted for effusion possible parapneumonic effusion. ID consulted. Discussed plan of care with daughter and grandson at bedside. DNR/DNI confirmed but want all other medical and surgical interventions.  (18 Jun 2022 23:40)    recently discharged 6/16/22 per daughter, had low grade fever at that time.   at home progressively decline.  EMS was called, evaluated the patient, and brought to hospital.     fevers noted here.   CT scan showed:  1.  Bilateral pleural effusions (right greater than left).  2.  Complete passive atelectasis of the right lower lobe.  3.  Bilateral linear atelectasis.  4.  Groundglass opacities and septal thickening are of uncertain etiology.  5.  Bilateral pulmonary nodules, some which have a branching morphology   are uncertain etiology. A follow-up is recommended in 4-6 weeks to   evaluate for resolution/change        Impression  fevers  weakness  pleural effusions  ESRD on HD      Plan:  initial workup for fever negative     repeat fevers 101.9 on 6/23 at 4AM; and fever  102.1 (23 Jun 2022 20:00)    Still with fevers; workup in process- RVP (-)  - swallow eval ?aspiration, ct a/p doppler RLE r/o DVT, repeat BCX x 2  - can send tick panels and viral markers  - may need to consider indium although given recent surgery may give false positive    CONTINUE   Merrem 500mg Q12H      Repeat CT chest 6/23/22:  Right pleural effusion has decreased when compared to   previous exam.    Multiple very small nodules are present in the peripheral aspect of both   lungs. Follow-up CT scan is recommended in 3 months to ensure resolution.      s/p drainage of pleural effusion 6/19/22   cultures from pleural fluid, so far is negative   - s/p removal of Right chest tube 6/22/22        ESRD on HD  - renal following     d/w rn  will follow   This 88 y.o. M with PMHX ESRD/HD on Mondays and Fridays ([place]), Hx GIB 2/2 AVM, CAD, HTN, HLD, PPM, CHFpEF, PAD s/p L Fem Bypass with cryovein c/b acute graft rethrombosis s/p thrombectomy now s/p L AKA discharged 2 days ago from Vascular Service after SICU admission for Hemorrhagic Shock s/p L AKA on Augmentin for "FUO". Patient brought back to [facility] ER for recurrent fevers despite abx with Augmentin and delirium. CXR from prior hospitalization with blunted costophrenic angles.   COVID/RVP Negative . Pt evaluated by Vascular Surgery for L AKA in ER. CT Surgery consulted for effusion possible parapneumonic effusion. ID consulted. Discussed plan of care with daughter and grandson at bedside. DNR/DNI confirmed but want all other medical and surgical interventions.  (18 Jun 2022 23:40)    recently discharged 6/16/22 per daughter, had low grade fever at that time.   at home progressively decline.  EMS was called, evaluated the patient, and brought to hospital.     fevers noted here.   CT scan showed:  1.  Bilateral pleural effusions (right greater than left).  2.  Complete passive atelectasis of the right lower lobe.  3.  Bilateral linear atelectasis.  4.  Groundglass opacities and septal thickening are of uncertain etiology.  5.  Bilateral pulmonary nodules, some which have a branching morphology   are uncertain etiology. A follow-up is recommended in 4-6 weeks to   evaluate for resolution/change        Impression  fevers  weakness  pleural effusions  ESRD on HD      Plan:  initial workup for fever negative     repeat fevers 101.9 on 6/23 at 4AM; and fever  102.1 (23 Jun 2022 20:00)    Still with fevers; workup in process-  - nontoxic  -  RVP (-)  - swallow eval ?aspiration, ct a/p doppler RLE r/o DVT, repeat BCX x 2  - can send tick panels and viral markers  - may need to consider indium although given recent surgery may give false positive    CONTINUE   Merrem 500mg Q12H      Repeat CT chest 6/23/22:  Right pleural effusion has decreased when compared to   previous exam.    Multiple very small nodules are present in the peripheral aspect of both   lungs. Follow-up CT scan is recommended in 3 months to ensure resolution.      s/p drainage of pleural effusion 6/19/22   cultures from pleural fluid, so far is negative   - s/p removal of Right chest tube 6/22/22          d/w rn  will follow

## 2022-06-26 NOTE — PROGRESS NOTE ADULT - SUBJECTIVE AND OBJECTIVE BOX
CHIEF COMPLAINT/INTERVAL HISTORY:    Patient is a 88y old  Male who presents with a chief complaint of Recurrent Fevers (2022 11:52)      HPI:  88M with PMHX ESRD/HD ([place]), Hx GIB 2/2 AVM, CAD, HTN, HLD, PPM, CHFpEF, PAD s/p L Fem Bypass with cryovein c/b acute graft rethrombosis s/p thrombectomy now s/p L AKA discharged 2 days ago from Vascular Service after SICU admission for Hemorrhagic Shock s/p L AKA on Augmentin for "FUO". Patient brought back to [facility] ER for recurrent fevers despite abx with Augmentin and delirium. CXR from prior hospitalization with blunted costophrenic angles. Repeat CXR unchanged. COVID/RVP Negative. CT Chest WO done shows moderate-large pleural effusion on my review awaiting official read by Radiology. Pt evaluated by Vascular Surgery for L AKA in ER. CT Surgery consulted for effusion possible parapneumonic effusion. ID consulted. Discussed plan of care with daughter and grandson at bedside. DNR/DNI confirmed but want all other medical and surgical interventions. Agreed to reconsult Palliative Care. Family also requesting Cardiology Consult for PPM interrogation while inpt. ROS +Delirium +Insomnia (no sleep x48 hours) +Fevers. ROS negative unless mentioned.  (2022 23:40)      SUBJECTIVE & OBJECTIVE: Pt seen and examined at bedside.     ICU Vital Signs Last 24 Hrs  T(C): 36.4 (2022 09:45), Max: 38.4 (2022 20:43)  T(F): 97.6 (2022 09:45), Max: 101.2 (2022 20:43)  HR: 59 (2022 09:44) (53 - 77)  BP: 159/74 (2022 09:44) (137/57 - 190/81)  BP(mean): --  ABP: --  ABP(mean): --  RR: 17 (2022 09:44) (17 - 18)  SpO2: 96% (2022 09:44) (96% - 100%)        MEDICATIONS  (STANDING):  aspirin  chewable 81 milliGRAM(s) Oral daily  atorvastatin 80 milliGRAM(s) Oral at bedtime  chlorhexidine 2% Cloths 1 Application(s) Topical daily  epoetin juan-epbx (RETACRIT) Injectable 42868 Unit(s) IV Push <User Schedule>  heparin   Injectable 5000 Unit(s) SubCutaneous every 12 hours  isosorbide   mononitrate ER Tablet (IMDUR) 30 milliGRAM(s) Oral daily  lidocaine   4% Patch 1 Patch Transdermal every 24 hours  meropenem  IVPB 500 milliGRAM(s) IV Intermittent every 24 hours  multivitamin 1 Tablet(s) Oral daily  Nephro-pito 1 Tablet(s) Oral at bedtime  pantoprazole    Tablet 40 milliGRAM(s) Oral before breakfast  QUEtiapine 25 milliGRAM(s) Oral at bedtime  sevelamer carbonate 800 milliGRAM(s) Oral every 8 hours  tamsulosin 0.4 milliGRAM(s) Oral at bedtime  traZODone 50 milliGRAM(s) Oral at bedtime    MEDICATIONS  (PRN):  acetaminophen     Tablet .. 650 milliGRAM(s) Oral every 6 hours PRN Temp greater or equal to 38C (100.4F), Mild Pain (1 - 3)  ondansetron Injectable 4 milliGRAM(s) IV Push every 8 hours PRN Nausea and/or Vomiting      LABS:                        9.9    6.51  )-----------( 349      ( 2022 06:56 )             32.9     -    136  |  97<L>  |  35.4<H>  ----------------------------<  102<H>  5.1   |  26.0  |  3.21<H>    Ca    9.3      2022 06:56  Phos  3.1     -  Mg     1.9         TPro  6.4<L>  /  Alb  3.0<L>  /  TBili  0.4  /  DBili  x   /  AST  32  /  ALT  23  /  AlkPhos  273<H>        Urinalysis Basic - ( 2022 04:26 )    Color: Yellow / Appearance: Clear / S.010 / pH: x  Gluc: x / Ketone: Negative  / Bili: Negative / Urobili: Negative mg/dL   Blood: x / Protein: 100 / Nitrite: Negative   Leuk Esterase: Negative / RBC: 0-2 /HPF / WBC Negative /HPF   Sq Epi: x / Non Sq Epi: Occasional / Bacteria: x        CAPILLARY BLOOD GLUCOSE      POCT Blood Glucose.: 109 mg/dL (2022 05:43)      RECENT CULTURES:      RADIOLOGY & ADDITIONAL TESTS:      PHYSICAL EXAM:    GENERAL: NAD, well-groomed, well-developed  HEAD:  Atraumatic, Normocephalic  EYES: EOMI, PERRLA, conjunctiva and sclera clear  ENMT: Moist mucous membranes  NECK: Supple, No JVD  NERVOUS SYSTEM:  Alert & Oriented X3, Motor Strength 5/5 B/L upper and lower extremities; DTRs 2+ intact and symmetric  CHEST/LUNG: Clear to auscultation bilaterally; No rales, rhonchi, wheezing, or rubs  HEART: Regular rate and rhythm; No murmurs, rubs, or gallops  ABDOMEN: Soft, Nontender, Nondistended; Bowel sounds present  EXTREMITIES:  2+ Peripheral Pulses, No clubbing, cyanosis, or edema        DVT/GI ppx  Discussed with pt @ bedside CHIEF COMPLAINT/INTERVAL HISTORY:    Patient is a 88y old  Male who presents with a chief complaint of Recurrent Fevers (2022 11:52)    SUBJECTIVE & OBJECTIVE: Pt seen and examined at bedside. Febrile yesterday evening. AAO x 2 earlier today. Denies any acute complaints; appears weak.    ROS: Denies chest pain or SOB; limited due to cognition    ICU Vital Signs Last 24 Hrs  T(C): 36.4 (2022 09:45), Max: 38.4 (2022 20:43)  T(F): 97.6 (2022 09:45), Max: 101.2 (2022 20:43)  HR: 59 (2022 09:44) (53 - 77)  BP: 159/74 (2022 09:44) (137/57 - 190/81)  RR: 17 (2022 09:44) (17 - 18)  SpO2: 96% (2022 09:44) (96% - 100%)    MEDICATIONS  (STANDING):  aspirin  chewable 81 milliGRAM(s) Oral daily  atorvastatin 80 milliGRAM(s) Oral at bedtime  chlorhexidine 2% Cloths 1 Application(s) Topical daily  epoetin juan-epbx (RETACRIT) Injectable 43008 Unit(s) IV Push <User Schedule>  heparin   Injectable 5000 Unit(s) SubCutaneous every 12 hours  isosorbide   mononitrate ER Tablet (IMDUR) 30 milliGRAM(s) Oral daily  lidocaine   4% Patch 1 Patch Transdermal every 24 hours  meropenem  IVPB 500 milliGRAM(s) IV Intermittent every 24 hours  multivitamin 1 Tablet(s) Oral daily  Nephro-pito 1 Tablet(s) Oral at bedtime  pantoprazole    Tablet 40 milliGRAM(s) Oral before breakfast  QUEtiapine 25 milliGRAM(s) Oral at bedtime  sevelamer carbonate 800 milliGRAM(s) Oral every 8 hours  tamsulosin 0.4 milliGRAM(s) Oral at bedtime  traZODone 50 milliGRAM(s) Oral at bedtime    MEDICATIONS  (PRN):  acetaminophen     Tablet .. 650 milliGRAM(s) Oral every 6 hours PRN Temp greater or equal to 38C (100.4F), Mild Pain (1 - 3)  ondansetron Injectable 4 milliGRAM(s) IV Push every 8 hours PRN Nausea and/or Vomiting      LABS:                        9.9    6.51  )-----------( 349      ( 2022 06:56 )             32.9         136  |  97<L>  |  35.4<H>  ----------------------------<  102<H>  5.1   |  26.0  |  3.21<H>    Ca    9.3      2022 06:56  Phos  3.1       Mg     1.9         TPro  6.4<L>  /  Alb  3.0<L>  /  TBili  0.4  /  DBili  x   /  AST  32  /  ALT  23  /  AlkPhos  273<H>        Urinalysis Basic - ( 2022 04:26 )    Color: Yellow / Appearance: Clear / S.010 / pH: x  Gluc: x / Ketone: Negative  / Bili: Negative / Urobili: Negative mg/dL   Blood: x / Protein: 100 / Nitrite: Negative   Leuk Esterase: Negative / RBC: 0-2 /HPF / WBC Negative /HPF   Sq Epi: x / Non Sq Epi: Occasional / Bacteria: x    PHYSICAL EXAM:    GENERAL: elderly male, laying in bed, drowsy, not in acute distress  HEAD:  Atraumatic, Normocephalic  EYES: EOMI, PERRLA, conjunctiva and sclera clear  ENMT: Moist mucous membranes  NECK: Supple   NERVOUS SYSTEM:  Alert & Oriented X2, intermittent drowsiness  CHEST/LUNG: coarse breath sounds  HEART: Regular rate and rhythm; + S1/S2  ABDOMEN: Soft, Nontender, Nondistended; Bowel sounds present  EXTREMITIES:  no RLE edema, left AKA stump with staples in tact and no wound dehiscence

## 2022-06-26 NOTE — PROGRESS NOTE ADULT - ASSESSMENT
Patient is a 88 year old male with PMH of ESRD on HD ([place]), Hx GIB 2/2 AVM, CAD, HTN, HLD, PPM, CHFpEF, PAD s/p L Fem Bypass with cryovein c/b acute graft rethrombosis s/p thrombectomy now s/p L AKA discharged 2 days ago from Vascular Service after SICU admission for Hemorrhagic Shock s/p L AKA on Augmentin for "FUO" discharged two days admitted for Recurrent Fevers and AMS r/o Pleural Effusion/Parapneumonic Effusions.    #Acute Hypoxic Respiratory Failure due to Pleural Effusions  -remains hypoxic but stable on nasal canula  -s/p right chest tube which was removed on 6/23  -repeat CT chest with stable effusion and ground glass opacities consistent with PNA  -Repeat CT chest as outpatient for pulmonary nodule surveillance  -pleural fluid culture and cytology negative  -ultrafiltration and torsemide to help facilitate fluid removal  -CT surgery recs appreciated    #Fevers; unclear etiology   -CT chest reporting ground glass opacities consistent with PNA   -pleural fluid culture negative  -f/u repeat blood cultures    -UA negative  -RLE duplex to rule out DVT pending  -CT abd/pelvis negative for acute pathology  -repeat COVID and RVP negative  -continue empiric merrem  -no clinical concerns meningitis therefore LP deferred   -skin in tact and stump without concerns for infection  -no concerns for line sepsis or endocarditis    -WBC remains normal   -tick panel ordered  -eventual indium scan?; will discuss with Dr. Bryan on 6/27  -monitor fever curve  -SLP consult for MBS given concerns for silent aspiration  -ID follow up appreciated    #PAD s/p L Fem Bypass with cryovein c/b acute graft rethrombosis s/p thrombectomy now s/p L AKA  stump site clean without acute concerns for infection  Continue aspirin and statin  Analgesia as needed  Vascular Surgery recs appreciated; will recall on Monday to remove staples    #Hx LLE DVT  Eliquis Held during last hospitalization due to hemorrhagic shock  RLE duplex ordered to exclude new DVT given fevers of unclear origin  continue DVT ppx    #ESRD on HD (TTS)  Next HD tentatively scheduled on 6/28  Midodrine to assist with UF  Continue torsemide  Continue Nephrovite daily  Continue Sevelamer TID with meals  Strict I/os, daily weights  Avoid nephrotoxic agents and renally dose medications for eGFR < 10  Renal recs appreciated    #Chronic Diastolic Heart Failure  Appears euvolemic; UF goal per renal  Continue Torsemide  Continue Imdur  Fluid restriction  strict I/os, daily weights    #Delirium likely in the setting of fevers and hospital confinement   Family reports patient has been increasingly getting more forgetful in the past few months  No meningeal signs or clinical concerns for meningitis as discussed with ID  CT head deferred per family's request since mentation fluctuation   Continue Melatonin 3mg, Trazodone 50 mg and Seroquel 25mg   Will request behavioral health evaluation for further adjustment of medications if delirium does not improve or patient becomes agitated    # CAD  -continue aspirin and statin    #HLD  -continue statin    #HTN  -BP stable on midodrine; wean as tolerated  -continue torsemide and imdur  -low sodium diet    #Constipation  - resolved    #BPH  -continue flomax    #Anemia of chronic kidney disease  -Hb noted  -iron studies reviewed  -continue procrit  -monitor CBC closely    DVT ppx - Heparin SC    Dispo: Remains acute; fever work up in process. MBS tentatively planned for 6/27; will discussed with SLP. Possible Indium scan.    Plan discussed with patient's daughter, RN, Dr. Short

## 2022-06-27 NOTE — PROGRESS NOTE ADULT - SUBJECTIVE AND OBJECTIVE BOX
INFECTIOUS DISEASES AND INTERNAL MEDICINE at [place]  =======================================================  Pawan Short MD  Diplomates American Board of Internal Medicine and Infectious Diseases  Telephone [phone]  Fax            [phone]  =======================================================    CHRISTIANO ELIZABETH [id number]    Follow up: FEVERS     Allergies:  Plavix (Hives)  Toprol-XL (Rash)      Medications:  acetaminophen     Tablet .. 650 milliGRAM(s) Oral every 6 hours PRN  aspirin  chewable 81 milliGRAM(s) Oral daily  atorvastatin 80 milliGRAM(s) Oral at bedtime  chlorhexidine 2% Cloths 1 Application(s) Topical daily  epoetin juan-epbx (RETACRIT) Injectable 88815 Unit(s) IV Push <User Schedule>  heparin   Injectable 5000 Unit(s) SubCutaneous every 12 hours  isosorbide   mononitrate ER Tablet (IMDUR) 30 milliGRAM(s) Oral daily  lidocaine   4% Patch 1 Patch Transdermal every 24 hours  meropenem  IVPB 500 milliGRAM(s) IV Intermittent every 24 hours  multivitamin 1 Tablet(s) Oral daily  Nephro-pito 1 Tablet(s) Oral at bedtime  ondansetron Injectable 4 milliGRAM(s) IV Push every 8 hours PRN  pantoprazole    Tablet 40 milliGRAM(s) Oral before breakfast  QUEtiapine 25 milliGRAM(s) Oral at bedtime  sevelamer carbonate 800 milliGRAM(s) Oral every 8 hours  tamsulosin 0.4 milliGRAM(s) Oral at bedtime  traZODone 50 milliGRAM(s) Oral at bedtime    SOCIAL       FAMILY   FAMILY HISTORY:  Family history of cancer (Grandparent)    Family history of lung cancer (Grandparent)    Family history of premature CAD    FH: type 2 diabetes      REVIEW OF SYSTEMS:  CONFUSED UNABLE TO OBTAIN           Physical Exam:  ICU Vital Signs Last 24 Hrs  T(C): 37.6 (27 Jun 2022 07:30), Max: 38.7 (26 Jun 2022 17:04)  T(F): 99.7 (27 Jun 2022 07:30), Max: 101.7 (26 Jun 2022 17:04)  HR: 114 (27 Jun 2022 05:03) (59 - 114)  BP: 133/73 (27 Jun 2022 05:03) (133/73 - 159/74)  BP(mean): --  ABP: --  ABP(mean): --  RR: 17 (27 Jun 2022 05:03) (17 - 18)  SpO2: 99% (27 Jun 2022 05:05) (88% - 99%)    GEN: NAD,   HEENT: normocephalic and atraumatic. EOMI. DAISY.    NECK: Supple. No carotid bruits.  No lymphadenopathy or thyromegaly.  LUNGS: Clear to auscultation.  HEART: Regular rate and rhythm without murmur.  ABDOMEN: Soft, nontender, and nondistended.  Positive bowel sounds.    : No CVA tenderness  EXTREMITIES: LEFT AKA SITE LOOKS CLEAN   MSK: no joint swelling  NEUROLOGIC: CONFUSED          Labs:  Vitals:  ============  T(F): 99.7 (27 Jun 2022 07:30), Max: 101.7 (26 Jun 2022 17:04)  HR: 114 (27 Jun 2022 05:03)  BP: 133/73 (27 Jun 2022 05:03)  RR: 17 (27 Jun 2022 05:03)  SpO2: 99% (27 Jun 2022 05:05) (88% - 99%)  temp max in last 48H T(F): , Max: 101.7 (06-26-22 @ 17:04)    =======================================================  Current Antibiotics:  meropenem  IVPB 500 milliGRAM(s) IV Intermittent every 24 hours    Other medications:  aspirin  chewable 81 milliGRAM(s) Oral daily  atorvastatin 80 milliGRAM(s) Oral at bedtime  chlorhexidine 2% Cloths 1 Application(s) Topical daily  epoetin juan-epbx (RETACRIT) Injectable 96490 Unit(s) IV Push <User Schedule>  heparin   Injectable 5000 Unit(s) SubCutaneous every 12 hours  isosorbide   mononitrate ER Tablet (IMDUR) 30 milliGRAM(s) Oral daily  lidocaine   4% Patch 1 Patch Transdermal every 24 hours  multivitamin 1 Tablet(s) Oral daily  Nephro-pito 1 Tablet(s) Oral at bedtime  pantoprazole    Tablet 40 milliGRAM(s) Oral before breakfast  QUEtiapine 25 milliGRAM(s) Oral at bedtime  sevelamer carbonate 800 milliGRAM(s) Oral every 8 hours  tamsulosin 0.4 milliGRAM(s) Oral at bedtime  traZODone 50 milliGRAM(s) Oral at bedtime      =======================================================  Labs:                        9.9    6.51  )-----------( 349      ( 26 Jun 2022 06:56 )             32.9     06-26    136  |  97<L>  |  35.4<H>  ----------------------------<  102<H>  5.1   |  26.0  |  3.21<H>    Ca    9.3      26 Jun 2022 06:56  Phos  3.1     06-26  Mg     1.9     06-26        Culture - Blood (collected 06-25-22 @ 16:34)  Source: .Blood Blood-Peripheral    Culture - Blood (collected 06-25-22 @ 16:34)  Source: .Blood Blood-Peripheral    Culture - Blood (collected 06-23-22 @ 11:16)  Source: .Blood Blood-Peripheral    Culture - Blood (collected 06-23-22 @ 11:16)  Source: .Blood Blood-Peripheral    Culture - Fungal, Body Fluid (collected 06-19-22 @ 18:21)  Source: .Body Fluid Pleural Fluid    Culture - Body Fluid with Gram Stain (collected 06-19-22 @ 18:21)  Source: .Body Fluid Pleural Fluid  Gram Stain (06-19-22 @ 23:08):    polymorphonuclear leukocytes seen    No organisms seen    by cytocentrifuge  Final Report (06-24-22 @ 17:14):    No growth at 5 days    Culture - Blood (collected 06-19-22 @ 03:11)  Source: .Blood Blood-Peripheral  Final Report (06-24-22 @ 04:00):    No Growth Final    Culture - Blood (collected 06-19-22 @ 03:08)  Source: .Blood Blood-Peripheral  Final Report (06-24-22 @ 04:00):    No Growth Final    Culture - Blood (collected 06-14-22 @ 05:44)  Source: .Blood Blood  Final Report (06-19-22 @ 06:00):    No Growth Final    Culture - Blood (collected 06-14-22 @ 05:44)  Source: .Blood Blood  Final Report (06-19-22 @ 06:00):    No Growth Final      Creatinine, Serum: 3.21 mg/dL (06-26-22 @ 06:56)  Creatinine, Serum: 4.76 mg/dL (06-25-22 @ 07:19)  Creatinine, Serum: 3.56 mg/dL (06-24-22 @ 07:20)  Creatinine, Serum: 4.23 mg/dL (06-23-22 @ 07:01)    Procalcitonin, Serum: 0.79 ng/mL (06-26-22 @ 06:56)  Procalcitonin, Serum: 0.79 ng/mL (06-26-22 @ 06:56)  Procalcitonin, Serum: 1.37 ng/mL (06-19-22 @ 07:37)      Ferritin, Serum: 2637 ng/mL (06-26-22 @ 06:56)      WBC Count: 6.51 K/uL (06-26-22 @ 06:56)  WBC Count: 8.50 K/uL (06-25-22 @ 07:19)  WBC Count: 6.50 K/uL (06-24-22 @ 07:20)  WBC Count: 6.31 K/uL (06-23-22 @ 07:01)    SARS-CoV-2: NotDetec (06-25-22 @ 16:28)  COVID-19 PCR: NotDetec (06-25-22 @ 06:24)  SARS-CoV-2: NotDetec (06-18-22 @ 17:24)  COVID-19 PCR: NotDetec (06-13-22 @ 06:58)  COVID-19 PCR: NotDetec (06-03-22 @ 18:58)  COVID-19 PCR: NotDetec (05-31-22 @ 04:59)    Lactate Dehydrogenase, Serum: 206 U/L (06-20-22 @ 07:07)    Alkaline Phosphatase, Serum: 273 U/L (06-25-22 @ 07:19)  Alkaline Phosphatase, Serum: 311 U/L (06-24-22 @ 07:20)  Alanine Aminotransferase (ALT/SGPT): 23 U/L (06-25-22 @ 07:19)  Alanine Aminotransferase (ALT/SGPT): 20 U/L (06-24-22 @ 07:20)  Aspartate Aminotransferase (AST/SGOT): 32 U/L (06-25-22 @ 07:19)  Aspartate Aminotransferase (AST/SGOT): 31 U/L (06-24-22 @ 07:20)  Bilirubin Total, Serum: 0.4 mg/dL (06-25-22 @ 07:19)  Bilirubin Total, Serum: 0.5 mg/dL (06-24-22 @ 07:20)

## 2022-06-27 NOTE — SWALLOW VFSS/MBS ASSESSMENT ADULT - SLP GENERAL OBSERVATIONS
Pt received & seen seated upright via stretcher in radiology, awake with periods of lethargy noted, reduced cognition, 02 NC, daughter Vanessa present, RN Robert present, 0/10 pain

## 2022-06-27 NOTE — SWALLOW VFSS/MBS ASSESSMENT ADULT - ORAL PHASE
Delayed oral transit time/Uncontrolled bolus / spillover in mil[place]-pharynx Uncontrolled bolus / spillover in milton-pharynx/Uncontrolled bolus / spillover in hypopharynx Delayed oral transit time/Reduced anterior - posterior transport/Uncontrolled bolus / spillover in milton-pharynx/Uncontrolled bolus / spillover in hypopharynx

## 2022-06-27 NOTE — PROGRESS NOTE ADULT - SUBJECTIVE AND OBJECTIVE BOX
CRYSTALCHRISTIANO [id number]    Follow up: FEVER, ESRD - HD     Allergies:  Plavix (Hives)  Toprol-XL (Rash)    Medications:  acetaminophen     Tablet .. 650 milliGRAM(s) Oral every 6 hours PRN  aspirin  chewable 81 milliGRAM(s) Oral daily  atorvastatin 80 milliGRAM(s) Oral at bedtime  chlorhexidine 2% Cloths 1 Application(s) Topical daily  epoetin juan-epbx (RETACRIT) Injectable 24891 Unit(s) IV Push <User Schedule>  heparin   Injectable 5000 Unit(s) SubCutaneous every 12 hours  isosorbide   mononitrate ER Tablet (IMDUR) 30 milliGRAM(s) Oral daily  lidocaine   4% Patch 1 Patch Transdermal every 24 hours  meropenem  IVPB 500 milliGRAM(s) IV Intermittent every 24 hours  multivitamin 1 Tablet(s) Oral daily  Nephro-pito 1 Tablet(s) Oral at bedtime  ondansetron Injectable 4 milliGRAM(s) IV Push every 8 hours PRN  pantoprazole    Tablet 40 milliGRAM(s) Oral before breakfast  QUEtiapine 25 milliGRAM(s) Oral at bedtime  sevelamer carbonate 800 milliGRAM(s) Oral every 8 hours  tamsulosin 0.4 milliGRAM(s) Oral at bedtime  traZODone 50 milliGRAM(s) Oral at bedtime    SOCIAL       FAMILY   FAMILY HISTORY:  Family history of cancer (Grandparent)    Family history of lung cancer (Grandparent)    Family history of premature CAD    FH: type 2 diabetes      REVIEW OF SYSTEMS:  CONFUSED UNABLE TO OBTAIN           Physical Exam:  ICU Vital Signs Last 24 Hrs  T(C): 37.6 (27 Jun 2022 07:30), Max: 38.7 (26 Jun 2022 17:04)  T(F): 99.7 (27 Jun 2022 07:30), Max: 101.7 (26 Jun 2022 17:04)  HR: 114 (27 Jun 2022 05:03) (59 - 114)  BP: 133/73 (27 Jun 2022 05:03) (133/73 - 159/74)  BP(mean): --  ABP: --  ABP(mean): --  RR: 17 (27 Jun 2022 05:03) (17 - 18)  SpO2: 99% (27 Jun 2022 05:05) (88% - 99%)    GEN: NAD,   HEENT: normocephalic and atraumatic. EOMI. DAISY.    NECK: Supple. No carotid bruits.  No lymphadenopathy or thyromegaly.  LUNGS: Clear to auscultation.  HEART: Regular rate and rhythm without murmur.  ABDOMEN: Soft, nontender, and nondistended.  Positive bowel sounds.    : No CVA tenderness  EXTREMITIES: LEFT AKA SITE LOOKS CLEAN   MSK: no joint swelling  NEUROLOGIC: CONFUSED          Labs:  Vitals:  ============  T(F): 99.7 (27 Jun 2022 07:30), Max: 101.7 (26 Jun 2022 17:04)  HR: 114 (27 Jun 2022 05:03)  BP: 133/73 (27 Jun 2022 05:03)  RR: 17 (27 Jun 2022 05:03)  SpO2: 99% (27 Jun 2022 05:05) (88% - 99%)  temp max in last 48H T(F): , Max: 101.7 (06-26-22 @ 17:04)    =======================================================  Current Antibiotics:  meropenem  IVPB 500 milliGRAM(s) IV Intermittent every 24 hours    Other medications:  aspirin  chewable 81 milliGRAM(s) Oral daily  atorvastatin 80 milliGRAM(s) Oral at bedtime  chlorhexidine 2% Cloths 1 Application(s) Topical daily  epoetin juan-epbx (RETACRIT) Injectable 64837 Unit(s) IV Push <User Schedule>  heparin   Injectable 5000 Unit(s) SubCutaneous every 12 hours  isosorbide   mononitrate ER Tablet (IMDUR) 30 milliGRAM(s) Oral daily  lidocaine   4% Patch 1 Patch Transdermal every 24 hours  multivitamin 1 Tablet(s) Oral daily  Nephro-pito 1 Tablet(s) Oral at bedtime  pantoprazole    Tablet 40 milliGRAM(s) Oral before breakfast  QUEtiapine 25 milliGRAM(s) Oral at bedtime  sevelamer carbonate 800 milliGRAM(s) Oral every 8 hours  tamsulosin 0.4 milliGRAM(s) Oral at bedtime  traZODone 50 milliGRAM(s) Oral at bedtime    =======================================================  Labs:                        9.9    6.51  )-----------( 349      ( 26 Jun 2022 06:56 )             32.9     06-26    136  |  97<L>  |  35.4<H>  ----------------------------<  102<H>  5.1   |  26.0  |  3.21<H>    Ca    9.3      26 Jun 2022 06:56  Phos  3.1     06-26  Mg     1.9     06-26    Culture - Blood (collected 06-25-22 @ 16:34)  Source: .Blood Blood-Peripheral    Culture - Blood (collected 06-25-22 @ 16:34)  Source: .Blood Blood-Peripheral    Culture - Blood (collected 06-23-22 @ 11:16)  Source: .Blood Blood-Peripheral    Culture - Blood (collected 06-23-22 @ 11:16)  Source: .Blood Blood-Peripheral    Culture - Fungal, Body Fluid (collected 06-19-22 @ 18:21)  Source: .Body Fluid Pleural Fluid    Culture - Body Fluid with Gram Stain (collected 06-19-22 @ 18:21)  Source: .Body Fluid Pleural Fluid  Gram Stain (06-19-22 @ 23:08):    polymorphonuclear leukocytes seen    No organisms seen    by cytocentrifuge  Final Report (06-24-22 @ 17:14):    No growth at 5 days    Culture - Blood (collected 06-19-22 @ 03:11)  Source: .Blood Blood-Peripheral  Final Report (06-24-22 @ 04:00):    No Growth Final    Culture - Blood (collected 06-19-22 @ 03:08)  Source: .Blood Blood-Peripheral  Final Report (06-24-22 @ 04:00):    No Growth Final    Culture - Blood (collected 06-14-22 @ 05:44)  Source: .Blood Blood  Final Report (06-19-22 @ 06:00):    No Growth Final    Culture - Blood (collected 06-14-22 @ 05:44)  Source: .Blood Blood  Final Report (06-19-22 @ 06:00):    No Growth Final    Creatinine, Serum: 3.21 mg/dL (06-26-22 @ 06:56)  Creatinine, Serum: 4.76 mg/dL (06-25-22 @ 07:19)  Creatinine, Serum: 3.56 mg/dL (06-24-22 @ 07:20)  Creatinine, Serum: 4.23 mg/dL (06-23-22 @ 07:01)    Procalcitonin, Serum: 0.79 ng/mL (06-26-22 @ 06:56)  Procalcitonin, Serum: 0.79 ng/mL (06-26-22 @ 06:56)  Procalcitonin, Serum: 1.37 ng/mL (06-19-22 @ 07:37)    Ferritin, Serum: 2637 ng/mL (06-26-22 @ 06:56)    WBC Count: 6.51 K/uL (06-26-22 @ 06:56)  WBC Count: 8.50 K/uL (06-25-22 @ 07:19)  WBC Count: 6.50 K/uL (06-24-22 @ 07:20)  WBC Count: 6.31 K/uL (06-23-22 @ 07:01)    SARS-CoV-2: NotDetec (06-25-22 @ 16:28)  COVID-19 PCR: NotDetec (06-25-22 @ 06:24)  SARS-CoV-2: NotDetec (06-18-22 @ 17:24)  COVID-19 PCR: NotDetec (06-13-22 @ 06:58)  COVID-19 PCR: NotDetec (06-03-22 @ 18:58)  COVID-19 PCR: NotDetec (05-31-22 @ 04:59)    Lactate Dehydrogenase, Serum: 206 U/L (06-20-22 @ 07:07)    Alkaline Phosphatase, Serum: 273 U/L (06-25-22 @ 07:19)  Alkaline Phosphatase, Serum: 311 U/L (06-24-22 @ 07:20)  Alanine Aminotransferase (ALT/SGPT): 23 U/L (06-25-22 @ 07:19)  Alanine Aminotransferase (ALT/SGPT): 20 U/L (06-24-22 @ 07:20)  Aspartate Aminotransferase (AST/SGOT): 32 U/L (06-25-22 @ 07:19)  Aspartate Aminotransferase (AST/SGOT): 31 U/L (06-24-22 @ 07:20)  Bilirubin Total, Serum: 0.4 mg/dL (06-25-22 @ 07:19)  Bilirubin Total, Serum: 0.5 mg/dL (06-24-22 @ 07:20)    This 88 y.o. M with PMHX ESRD/HD on Mondays and Fridays ([place]), Hx GIB 2/2 AVM, CAD, HTN, HLD, PPM, CHFpEF, PAD s/p L Fem Bypass with cryovein c/b acute graft rethrombosis s/p thrombectomy now s/p L AKA discharged 2 days ago from Vascular Service after SICU admission for Hemorrhagic Shock s/p L AKA on Augmentin for "FUO". Patient brought [facility] ER for recurrent fevers despite abx with Augmentin and delirium. CXR from prior hospitalization with blunted costophrenic angles.   COVID/RVP Negative . Pt evaluated by Vascular Surgery for L AKA in ER. CT Surgery consulted for effusion possible parapneumonic effusion. ID consulted. Discussed plan of care with daughter and grandson at bedside. DNR/DNI confirmed but want all other medical and surgical interventions.     recently discharged 6/16/22 per daughter, had low grade fever at that time.   at home progressively decline.  EMS was called, evaluated the patient, and brought to hospital.     fever noted here.     CT scan showed:  1.  Bilateral pleural effusions (right greater than left).  2.  Complete passive atelectasis of the right lower lobe.  3.  Bilateral linear atelectasis.  4.  Groundglass opacities and septal thickening are of uncertain etiology.  5.  Bilateral pulmonary nodules, some which have a branching morphology   are uncertain etiology. A follow-up is recommended in 4-6 weeks to   evaluate for resolution/change,     s/p drainage of pleural effusion 6/19/22   cultures from pleural fluid, so far is negative   - s/p removal of Right chest tube 6/22/22

## 2022-06-27 NOTE — SWALLOW VFSS/MBS ASSESSMENT ADULT - SLP PERTINENT HISTORY OF CURRENT PROBLEM
Pt known to this dept & was seen multiple times at bedside prior admissions & this admission, with h/o dysphagia. MBS now warranted to r/o silent aspiration

## 2022-06-27 NOTE — SWALLOW VFSS/MBS ASSESSMENT ADULT - RECOMMENDED FEEDING/EATING TECHNIQUES
allow for swallow between intakes/crush medication (when feasible)/maintain upright posture during/after eating for 30 mins/no straws/oral hygiene/position upright (90 degrees)/provide rest periods between swallows/small sips/bites

## 2022-06-27 NOTE — PROGRESS NOTE ADULT - ASSESSMENT
This 88 y.o. M with PMHX ESRD/HD on Mondays and Fridays ([place]), Hx GIB 2/2 AVM, CAD, HTN, HLD, PPM, CHFpEF, PAD s/p L Fem Bypass with cryovein c/b acute graft rethrombosis s/p thrombectomy now s/p L AKA discharged 2 days ago from Vascular Service after SICU admission for Hemorrhagic Shock s/p L AKA on Augmentin for "FUO". Patient brought back to [facility] ER for recurrent fevers despite abx with Augmentin and delirium. CXR from prior hospitalization with blunted costophrenic angles.   COVID/RVP Negative . Pt evaluated by Vascular Surgery for L AKA in ER. CT Surgery consulted for effusion possible parapneumonic effusion. ID consulted. Discussed plan of care with daughter and grandson at bedside. DNR/DNI confirmed but want all other medical and surgical interventions.     recently discharged 6/16/22 per daughter, had low grade fever at that time.   at home progressively decline.  EMS was called, evaluated the patient, and brought to hospital.     fevers noted here.   CT scan showed:  1.  Bilateral pleural effusions (right greater than left).  2.  Complete passive atelectasis of the right lower lobe.  3.  Bilateral linear atelectasis.  4.  Groundglass opacities and septal thickening are of uncertain etiology.  5.  Bilateral pulmonary nodules, some which have a branching morphology   are uncertain etiology. A follow-up is recommended in 4-6 weeks to   evaluate for resolution/change        Impression  fevers  weakness  pleural effusions  ESRD on HD      Plan:  initial workup for fever negative     repeat fevers 101.9 on 6/23 at 4AM; and fever  102.1 (23 Jun 2022 20:00)    Still with fevers; workup in process-  - nontoxic  -  RVP (-)  - swallow eval ?aspiration, ct a/p doppler RLE r/o DVT, repeat BCX x 2  -  ON  Merrem 500mg Q12H      Repeat CT chest 6/23/22:  Right pleural effusion has decreased when compared to   previous exam.    Multiple very small nodules are present in the peripheral aspect of both   lungs. Follow-up CT scan is recommended in 3 months to ensure resolution.      s/p drainage of pleural effusion 6/19/22   cultures from pleural fluid, so far is negative   - s/p removal of Right chest tube 6/22/22   PT WITH FEVERS  CONTINUED INTERMITTENT FEVERS DESPITE   BROAD SPECTRUM ABX  PT  IS NON TOXIC SUGGEST D/C ALL ABX AND OBSERVE AND REPEAT CX OFF ABX  SPOKE TO  DAUGHTER AT BEDSIDE

## 2022-06-27 NOTE — SWALLOW VFSS/MBS ASSESSMENT ADULT - COMMENTS
As per MD note: "Patient is a 88 year old male with PMH of ESRD on HD ([place]), Hx GIB 2/2 AVM, CAD, HTN, HLD, PPM, CHFpEF, PAD s/p L Fem Bypass with cryovein c/b acute graft rethrombosis s/p thrombectomy now s/p L AKA discharged 2 days ago from Vascular Service after SICU admission for Hemorrhagic Shock s/p L AKA on Augmentin for "FUO" discharged two days admitted for Recurrent Fevers and AMS r/o Pleural Effusion/Parapneumonic Effusions."

## 2022-06-27 NOTE — SWALLOW VFSS/MBS ASSESSMENT ADULT - DIAGNOSTIC IMPRESSIONS
Mild to moderate oral dysphagia for assessed PO, impacted by cognition, with reduced lingual strength & coordination. Pharyngeal stage of swallow grossly WFL for assessed PO, with no stasis, penetration &/or aspiration. It should be noted that the above results represent a segment in time & do not represent pt status over the course of a meal.   Easy to chew & regular solids not assessed, due to edentulous dentition (daughter reported pt with recently fitted for new dentures)

## 2022-06-27 NOTE — PROGRESS NOTE ADULT - ASSESSMENT
Patient is a 88 year old male with PMH of ESRD on HD ([place]), Hx GIB 2/2 AVM, CAD, HTN, HLD, PPM, CHFpEF, PAD s/p L Fem Bypass with cryovein c/b acute graft rethrombosis s/p thrombectomy now s/p L AKA discharged 2 days ago from Vascular Service after SICU admission for Hemorrhagic Shock s/p L AKA on Augmentin for "FUO" discharged two days admitted for Recurrent Fevers and AMS r/o Pleural Effusion/Parapneumonic Effusions.    #Acute Hypoxic Respiratory Failure due to Pleural Effusions  -hypoxia resolved; taken off O2  -s/p right chest tube which was removed on 6/23  -repeat CT chest with stable effusion and ground glass opacities consistent with PNA  -Repeat CT chest as outpatient for pulmonary nodule surveillance  -pleural fluid culture and cytology negative  -ultrafiltration and torsemide to help facilitate fluid removal  -CT surgery recs appreciated    #Fevers; unclear etiology   -CT chest reporting ground glass opacities consistent with PNA   -pleural fluid culture negative  -repeat blood cultures negative  -UA negative  -RLE duplex to rule out DVT pending  -CT abd/pelvis negative for acute pathology  -repeat COVID and RVP negative  -continue empiric merrem  -no clinical concerns meningitis therefore LP deferred   -skin in tact and stump without concerns for infection  -no concerns for line sepsis or endocarditis    -WBC remains normal   -tick panel and viral studies ordered  -monitor fever curve  -d/c all abx per ID and reculture if febrile  -MBS without concerns for aspiration   -ID follow up appreciated    #PAD s/p L Fem Bypass with cryovein c/b acute graft rethrombosis s/p thrombectomy now s/p L AKA  stump site clean without acute concerns for infection  Continue aspirin and statin  Analgesia as needed  Vascular Surgery recs appreciated; staples to be removed prior to discharge    #Hx LLE DVT  Eliquis Held during last hospitalization due to hemorrhagic shock  RLE duplex ordered to exclude new DVT given fevers of unclear origin  continue DVT ppx    #ESRD on HD (TTS)  Next HD tentatively scheduled on 6/28  Midodrine to assist with UF  Continue torsemide  Continue Nephrovite daily  Continue Sevelamer TID with meals  Strict I/os, daily weights  Avoid nephrotoxic agents and renally dose medications for eGFR < 10  Renal recs appreciated    #Chronic Diastolic Heart Failure  Appears euvolemic; UF goal per renal  Continue Torsemide  Continue Imdur  Fluid restriction  strict I/os, daily weights    #Delirium likely in the setting of fevers and hospital confinement   Family reports patient has been increasingly getting more forgetful in the past few months  No meningeal signs or clinical concerns for meningitis as discussed with ID  CT head deferred per family's request since mentation fluctuation   Continue Melatonin 3mg, Trazodone 50 mg and Seroquel 25mg   Will request behavioral health evaluation for further adjustment of medications if delirium worsens or patient becomes agitated    # CAD  -continue aspirin and statin    #HLD  -continue statin    #HTN  -BP stable on midodrine; wean as tolerated  -continue torsemide and imdur  -low sodium diet    #Constipation  - resolved    #BPH  -continue flomax    #Anemia of chronic kidney disease  -Hb noted  -iron studies reviewed  -continue procrit  -monitor CBC closely    DVT ppx - Heparin SC    Dispo: Remains acute; high grade fever today but all antibiotics discontinued. Reculture if febrile. Possible Indium scan.    Plan discussed with patient's daughter, RN, Dr. Bryan

## 2022-06-27 NOTE — PROGRESS NOTE ADULT - SUBJECTIVE AND OBJECTIVE BOX
CHIEF COMPLAINT/INTERVAL HISTORY:    Patient is a 88y old  Male who presents with a chief complaint of Recurrent Fevers (2022 08:09)    SUBJECTIVE & OBJECTIVE: Pt seen and examined at bedside. No overnight events. However, high grade fever this morning. Seen by ID - d/c all abx and reculture if febrile.    ROS: No chest pain, palpitations, SOB, light headedness, dizziness, headache, nausea/vomiting, fevers/chills, abdominal pain, dysuria.    ICU Vital Signs Last 24 Hrs  T(C): 37.6 (2022 07:30), Max: 38.7 (2022 05:03)  T(F): 99.7 (2022 07:30), Max: 101.6 (2022 05:03)  HR: 62 (2022 16:45) (60 - 114)  BP: 154/50 (2022 16:45) (133/73 - 154/50)  RR: 18 (2022 16:45) (17 - 18)  SpO2: 93% (2022 16:45) (88% - 99%)      MEDICATIONS  (STANDING):  aspirin  chewable 81 milliGRAM(s) Oral daily  atorvastatin 80 milliGRAM(s) Oral at bedtime  chlorhexidine 2% Cloths 1 Application(s) Topical daily  epoetin juan-epbx (RETACRIT) Injectable 55183 Unit(s) IV Push <User Schedule>  heparin   Injectable 5000 Unit(s) SubCutaneous every 12 hours  isosorbide   mononitrate ER Tablet (IMDUR) 30 milliGRAM(s) Oral daily  lidocaine   4% Patch 1 Patch Transdermal every 24 hours  Nephro-pito 1 Tablet(s) Oral at bedtime  pantoprazole    Tablet 40 milliGRAM(s) Oral before breakfast  QUEtiapine 25 milliGRAM(s) Oral at bedtime  sevelamer carbonate 800 milliGRAM(s) Oral every 8 hours  tamsulosin 0.4 milliGRAM(s) Oral at bedtime  traZODone 50 milliGRAM(s) Oral at bedtime    MEDICATIONS  (PRN):  acetaminophen     Tablet .. 650 milliGRAM(s) Oral every 6 hours PRN Temp greater or equal to 38C (100.4F), Mild Pain (1 - 3)  ondansetron Injectable 4 milliGRAM(s) IV Push every 8 hours PRN Nausea and/or Vomiting      LABS:                        9.7    7.17  )-----------( 354      ( 2022 11:13 )             31.8         136  |  97<L>  |  35.4<H>  ----------------------------<  102<H>  5.1   |  26.0  |  3.21<H>    Ca    9.3      2022 06:56  Phos  3.1       Mg     1.9             Urinalysis Basic - ( 2022 04:26 )    Color: Yellow / Appearance: Clear / S.010 / pH: x  Gluc: x / Ketone: Negative  / Bili: Negative / Urobili: Negative mg/dL   Blood: x / Protein: 100 / Nitrite: Negative   Leuk Esterase: Negative / RBC: 0-2 /HPF / WBC Negative /HPF   Sq Epi: x / Non Sq Epi: Occasional / Bacteria: x    PHYSICAL EXAM:    GENERAL: elderly male, sitting in chair, not in acute distress  HEAD:  Atraumatic, Normocephalic  EYES: EOMI, PERRLA, conjunctiva and sclera clear  ENMT: Moist mucous membranes  NECK: Supple   NERVOUS SYSTEM:  Alert & Oriented X2, intermittent drowsiness  CHEST/LUNG: coarse breath sounds  HEART: Regular rate and rhythm; + S1/S2  ABDOMEN: Soft, Nontender, Nondistended; Bowel sounds present  EXTREMITIES:  no RLE edema, left AKA stump with staples in tact and no wound dehiscence

## 2022-06-28 NOTE — PROGRESS NOTE ADULT - SUBJECTIVE AND OBJECTIVE BOX
INFECTIOUS DISEASES AND INTERNAL MEDICINE at [place]  =======================================================  Pawan Short MD  Diplomates American Board of Internal Medicine and Infectious Diseases  Telephone [phone]  Fax            [phone]  =======================================================    CHRISTIANO ELIZABETH [id number]    Follow up: FEVERS     Allergies:  Plavix (Hives)  Toprol-XL (Rash)      Medications:  acetaminophen     Tablet .. 650 milliGRAM(s) Oral every 6 hours PRN  aspirin  chewable 81 milliGRAM(s) Oral daily  atorvastatin 80 milliGRAM(s) Oral at bedtime  chlorhexidine 2% Cloths 1 Application(s) Topical daily  epoetin juan-epbx (RETACRIT) Injectable 16510 Unit(s) IV Push <User Schedule>  heparin   Injectable 5000 Unit(s) SubCutaneous every 12 hours  isosorbide   mononitrate ER Tablet (IMDUR) 30 milliGRAM(s) Oral daily  lidocaine   4% Patch 1 Patch Transdermal every 24 hours  meropenem  IVPB 500 milliGRAM(s) IV Intermittent every 24 hours  multivitamin 1 Tablet(s) Oral daily  Nephro-pito 1 Tablet(s) Oral at bedtime  ondansetron Injectable 4 milliGRAM(s) IV Push every 8 hours PRN  pantoprazole    Tablet 40 milliGRAM(s) Oral before breakfast  QUEtiapine 25 milliGRAM(s) Oral at bedtime  sevelamer carbonate 800 milliGRAM(s) Oral every 8 hours  tamsulosin 0.4 milliGRAM(s) Oral at bedtime  traZODone 50 milliGRAM(s) Oral at bedtime    SOCIAL       FAMILY   FAMILY HISTORY:  Family history of cancer (Grandparent)    Family history of lung cancer (Grandparent)    Family history of premature CAD    FH: type 2 diabetes      REVIEW OF SYSTEMS:  CONFUSED UNABLE TO OBTAIN           Physical Exam:  I Vital Signs Last 24 Hrs  T(C): 37.2 (28 Jun 2022 11:55), Max: 38.4 (27 Jun 2022 22:30)  T(F): 98.9 (28 Jun 2022 11:55), Max: 101.1 (27 Jun 2022 22:30)  HR: 61 (28 Jun 2022 11:55) (55 - 65)  BP: 124/53 (28 Jun 2022 11:55) (121/55 - 158/73)  BP(mean): --  RR: 18 (28 Jun 2022 11:55) (18 - 18)  SpO2: 100% (28 Jun 2022 11:55) (93% - 100%)    GEN: NAD,   HEENT: normocephalic and atraumatic. EOMI. DAISY.    NECK: Supple. No carotid bruits.  No lymphadenopathy or thyromegaly.  LUNGS: Clear to auscultation.  HEART: Regular rate and rhythm without murmur.  ABDOMEN: Soft, nontender, and nondistended.  Positive bowel sounds.    : No CVA tenderness  EXTREMITIES: LEFT AKA SITE LOOKS CLEAN   MSK: no joint swelling  NEUROLOGIC: CONFUSED          Labs:  Vitals:  ============   Vital Signs Last 24 Hrs  T(C): 37.2 (28 Jun 2022 11:55), Max: 38.4 (27 Jun 2022 22:30)  T(F): 98.9 (28 Jun 2022 11:55), Max: 101.1 (27 Jun 2022 22:30)  HR: 61 (28 Jun 2022 11:55) (55 - 65)  BP: 124/53 (28 Jun 2022 11:55) (121/55 - 158/73)  BP(mean): --  RR: 18 (28 Jun 2022 11:55) (18 - 18)  SpO2: 100% (28 Jun 2022 11:55) (93% - 100%)    =======================================================  Current Antibiotics:  meropenem  IVPB 500 milliGRAM(s) IV Intermittent every 24 hours    Other medications:  aspirin  chewable 81 milliGRAM(s) Oral daily  atorvastatin 80 milliGRAM(s) Oral at bedtime  chlorhexidine 2% Cloths 1 Application(s) Topical daily  epoetin juan-epbx (RETACRIT) Injectable 17397 Unit(s) IV Push <User Schedule>  heparin   Injectable 5000 Unit(s) SubCutaneous every 12 hours  isosorbide   mononitrate ER Tablet (IMDUR) 30 milliGRAM(s) Oral daily  lidocaine   4% Patch 1 Patch Transdermal every 24 hours  multivitamin 1 Tablet(s) Oral daily  Nephro-pito 1 Tablet(s) Oral at bedtime  pantoprazole    Tablet 40 milliGRAM(s) Oral before breakfast  QUEtiapine 25 milliGRAM(s) Oral at bedtime  sevelamer carbonate 800 milliGRAM(s) Oral every 8 hours  tamsulosin 0.4 milliGRAM(s) Oral at bedtime  traZODone 50 milliGRAM(s) Oral at bedtime      =======================================================  Labs:                                  10.3   8.41  )-----------( 368      ( 28 Jun 2022 07:15 )             34.3   06-28    141  |  98  |  73.2<H>  ----------------------------<  136<H>  5.6<H>   |  24.0  |  5.51<H>    Ca    10.1      28 Jun 2022 07:15  Phos  4.4     06-28  Mg     2.4     06-28            Culture - Blood (collected 06-25-22 @ 16:34)  Source: .Blood Blood-Peripheral    Culture - Blood (collected 06-25-22 @ 16:34)  Source: .Blood Blood-Peripheral    Culture - Blood (collected 06-23-22 @ 11:16)  Source: .Blood Blood-Peripheral    Culture - Blood (collected 06-23-22 @ 11:16)  Source: .Blood Blood-Peripheral    Culture - Fungal, Body Fluid (collected 06-19-22 @ 18:21)  Source: .Body Fluid Pleural Fluid    Culture - Body Fluid with Gram Stain (collected 06-19-22 @ 18:21)  Source: .Body Fluid Pleural Fluid  Gram Stain (06-19-22 @ 23:08):    polymorphonuclear leukocytes seen    No organisms seen    by cytocentrifuge  Final Report (06-24-22 @ 17:14):    No growth at 5 days    Culture - Blood (collected 06-19-22 @ 03:11)  Source: .Blood Blood-Peripheral  Final Report (06-24-22 @ 04:00):    No Growth Final    Culture - Blood (collected 06-19-22 @ 03:08)  Source: .Blood Blood-Peripheral  Final Report (06-24-22 @ 04:00):    No Growth Final    Culture - Blood (collected 06-14-22 @ 05:44)  Source: .Blood Blood  Final Report (06-19-22 @ 06:00):    No Growth Final    Culture - Blood (collected 06-14-22 @ 05:44)  Source: .Blood Blood  Final Report (06-19-22 @ 06:00):    No Growth Final      Creatinine, Serum: 3.21 mg/dL (06-26-22 @ 06:56)  Creatinine, Serum: 4.76 mg/dL (06-25-22 @ 07:19)  Creatinine, Serum: 3.56 mg/dL (06-24-22 @ 07:20)  Creatinine, Serum: 4.23 mg/dL (06-23-22 @ 07:01)    Procalcitonin, Serum: 0.79 ng/mL (06-26-22 @ 06:56)  Procalcitonin, Serum: 0.79 ng/mL (06-26-22 @ 06:56)  Procalcitonin, Serum: 1.37 ng/mL (06-19-22 @ 07:37)      Ferritin, Serum: 2637 ng/mL (06-26-22 @ 06:56)      WBC Count: 6.51 K/uL (06-26-22 @ 06:56)  WBC Count: 8.50 K/uL (06-25-22 @ 07:19)  WBC Count: 6.50 K/uL (06-24-22 @ 07:20)  WBC Count: 6.31 K/uL (06-23-22 @ 07:01)    SARS-CoV-2: NotDetec (06-25-22 @ 16:28)  COVID-19 PCR: NotDetec (06-25-22 @ 06:24)  SARS-CoV-2: NotDetec (06-18-22 @ 17:24)  COVID-19 PCR: NotDetec (06-13-22 @ 06:58)  COVID-19 PCR: NotDetec (06-03-22 @ 18:58)  COVID-19 PCR: NotDetec (05-31-22 @ 04:59)    Lactate Dehydrogenase, Serum: 206 U/L (06-20-22 @ 07:07)    Alkaline Phosphatase, Serum: 273 U/L (06-25-22 @ 07:19)  Alkaline Phosphatase, Serum: 311 U/L (06-24-22 @ 07:20)  Alanine Aminotransferase (ALT/SGPT): 23 U/L (06-25-22 @ 07:19)  Alanine Aminotransferase (ALT/SGPT): 20 U/L (06-24-22 @ 07:20)  Aspartate Aminotransferase (AST/SGOT): 32 U/L (06-25-22 @ 07:19)  Aspartate Aminotransferase (AST/SGOT): 31 U/L (06-24-22 @ 07:20)  Bilirubin Total, Serum: 0.4 mg/dL (06-25-22 @ 07:19)  Bilirubin Total, Serum: 0.5 mg/dL (06-24-22 @ 07:20)   INFECTIOUS DISEASES AND INTERNAL MEDICINE at [place]  =======================================================  Pawan Short MD  Diplomates American Board of Internal Medicine and Infectious Diseases  Telephone [phone]  Fax            [phone]  =======================================================    CHRISTIANO ELIZABETH [id number]    Follow up: FEVERS     Allergies:  Plavix (Hives)  Toprol-XL (Rash)      Medications:  acetaminophen     Tablet .. 650 milliGRAM(s) Oral every 6 hours PRN  aspirin  chewable 81 milliGRAM(s) Oral daily  atorvastatin 80 milliGRAM(s) Oral at bedtime  chlorhexidine 2% Cloths 1 Application(s) Topical daily  epoetin juan-epbx (RETACRIT) Injectable 34086 Unit(s) IV Push <User Schedule>  heparin   Injectable 5000 Unit(s) SubCutaneous every 12 hours  isosorbide   mononitrate ER Tablet (IMDUR) 30 milliGRAM(s) Oral daily  lidocaine   4% Patch 1 Patch Transdermal every 24 hours     Nephro-pito 1 Tablet(s) Oral at bedtime  ondansetron Injectable 4 milliGRAM(s) IV Push every 8 hours PRN  pantoprazole    Tablet 40 milliGRAM(s) Oral before breakfast  QUEtiapine 25 milliGRAM(s) Oral at bedtime  sevelamer carbonate 800 milliGRAM(s) Oral every 8 hours  tamsulosin 0.4 milliGRAM(s) Oral at bedtime  traZODone 50 milliGRAM(s) Oral at bedtime    SOCIAL       FAMILY   FAMILY HISTORY:  Family history of cancer (Grandparent)    Family history of lung cancer (Grandparent)    Family history of premature CAD    FH: type 2 diabetes      REVIEW OF SYSTEMS:  CONFUSED UNABLE TO OBTAIN           Physical Exam:  I Vital Signs Last 24 Hrs  T(C): 37.2 (28 Jun 2022 11:55), Max: 38.4 (27 Jun 2022 22:30)  T(F): 98.9 (28 Jun 2022 11:55), Max: 101.1 (27 Jun 2022 22:30)  HR: 61 (28 Jun 2022 11:55) (55 - 65)  BP: 124/53 (28 Jun 2022 11:55) (121/55 - 158/73)  BP(mean): --  RR: 18 (28 Jun 2022 11:55) (18 - 18)  SpO2: 100% (28 Jun 2022 11:55) (93% - 100%)    GEN: NAD,   HEENT: normocephalic and atraumatic. EOMI. DAISY.    NECK: Supple. No carotid bruits.  No lymphadenopathy or thyromegaly.  LUNGS: Clear to auscultation.  HEART: Regular rate and rhythm without murmur.  ABDOMEN: Soft, nontender, and nondistended.  Positive bowel sounds.    : No CVA tenderness  EXTREMITIES: LEFT AKA SITE LOOKS CLEAN   MSK: no joint swelling  NEUROLOGIC: CONFUSED          Labs:  Vitals:  ============   Vital Signs Last 24 Hrs  T(C): 37.2 (28 Jun 2022 11:55), Max: 38.4 (27 Jun 2022 22:30)  T(F): 98.9 (28 Jun 2022 11:55), Max: 101.1 (27 Jun 2022 22:30)  HR: 61 (28 Jun 2022 11:55) (55 - 65)  BP: 124/53 (28 Jun 2022 11:55) (121/55 - 158/73)  BP(mean): --  RR: 18 (28 Jun 2022 11:55) (18 - 18)  SpO2: 100% (28 Jun 2022 11:55) (93% - 100%)    =======================================================  Current Antibiotics:  meropenem  IVPB 500 milliGRAM(s) IV Intermittent every 24 hours    Other medications:  aspirin  chewable 81 milliGRAM(s) Oral daily  atorvastatin 80 milliGRAM(s) Oral at bedtime  chlorhexidine 2% Cloths 1 Application(s) Topical daily  epoetin juan-epbx (RETACRIT) Injectable 29447 Unit(s) IV Push <User Schedule>  heparin   Injectable 5000 Unit(s) SubCutaneous every 12 hours  isosorbide   mononitrate ER Tablet (IMDUR) 30 milliGRAM(s) Oral daily  lidocaine   4% Patch 1 Patch Transdermal every 24 hours  multivitamin 1 Tablet(s) Oral daily  Nephro-pito 1 Tablet(s) Oral at bedtime  pantoprazole    Tablet 40 milliGRAM(s) Oral before breakfast  QUEtiapine 25 milliGRAM(s) Oral at bedtime  sevelamer carbonate 800 milliGRAM(s) Oral every 8 hours  tamsulosin 0.4 milliGRAM(s) Oral at bedtime  traZODone 50 milliGRAM(s) Oral at bedtime      =======================================================  Labs:                                  10.3   8.41  )-----------( 368      ( 28 Jun 2022 07:15 )             34.3   06-28    141  |  98  |  73.2<H>  ----------------------------<  136<H>  5.6<H>   |  24.0  |  5.51<H>    Ca    10.1      28 Jun 2022 07:15  Phos  4.4     06-28  Mg     2.4     06-28            Culture - Blood (collected 06-25-22 @ 16:34)  Source: .Blood Blood-Peripheral    Culture - Blood (collected 06-25-22 @ 16:34)  Source: .Blood Blood-Peripheral    Culture - Blood (collected 06-23-22 @ 11:16)  Source: .Blood Blood-Peripheral    Culture - Blood (collected 06-23-22 @ 11:16)  Source: .Blood Blood-Peripheral    Culture - Fungal, Body Fluid (collected 06-19-22 @ 18:21)  Source: .Body Fluid Pleural Fluid    Culture - Body Fluid with Gram Stain (collected 06-19-22 @ 18:21)  Source: .Body Fluid Pleural Fluid  Gram Stain (06-19-22 @ 23:08):    polymorphonuclear leukocytes seen    No organisms seen    by cytocentrifuge  Final Report (06-24-22 @ 17:14):    No growth at 5 days    Culture - Blood (collected 06-19-22 @ 03:11)  Source: .Blood Blood-Peripheral  Final Report (06-24-22 @ 04:00):    No Growth Final    Culture - Blood (collected 06-19-22 @ 03:08)  Source: .Blood Blood-Peripheral  Final Report (06-24-22 @ 04:00):    No Growth Final    Culture - Blood (collected 06-14-22 @ 05:44)  Source: .Blood Blood  Final Report (06-19-22 @ 06:00):    No Growth Final    Culture - Blood (collected 06-14-22 @ 05:44)  Source: .Blood Blood  Final Report (06-19-22 @ 06:00):    No Growth Final      Creatinine, Serum: 3.21 mg/dL (06-26-22 @ 06:56)  Creatinine, Serum: 4.76 mg/dL (06-25-22 @ 07:19)  Creatinine, Serum: 3.56 mg/dL (06-24-22 @ 07:20)  Creatinine, Serum: 4.23 mg/dL (06-23-22 @ 07:01)    Procalcitonin, Serum: 0.79 ng/mL (06-26-22 @ 06:56)  Procalcitonin, Serum: 0.79 ng/mL (06-26-22 @ 06:56)  Procalcitonin, Serum: 1.37 ng/mL (06-19-22 @ 07:37)      Ferritin, Serum: 2637 ng/mL (06-26-22 @ 06:56)      WBC Count: 6.51 K/uL (06-26-22 @ 06:56)  WBC Count: 8.50 K/uL (06-25-22 @ 07:19)  WBC Count: 6.50 K/uL (06-24-22 @ 07:20)  WBC Count: 6.31 K/uL (06-23-22 @ 07:01)    SARS-CoV-2: NotDetec (06-25-22 @ 16:28)  COVID-19 PCR: NotDetec (06-25-22 @ 06:24)  SARS-CoV-2: NotDetec (06-18-22 @ 17:24)  COVID-19 PCR: NotDetec (06-13-22 @ 06:58)  COVID-19 PCR: NotDetec (06-03-22 @ 18:58)  COVID-19 PCR: NotDetec (05-31-22 @ 04:59)    Lactate Dehydrogenase, Serum: 206 U/L (06-20-22 @ 07:07)    Alkaline Phosphatase, Serum: 273 U/L (06-25-22 @ 07:19)  Alkaline Phosphatase, Serum: 311 U/L (06-24-22 @ 07:20)  Alanine Aminotransferase (ALT/SGPT): 23 U/L (06-25-22 @ 07:19)  Alanine Aminotransferase (ALT/SGPT): 20 U/L (06-24-22 @ 07:20)  Aspartate Aminotransferase (AST/SGOT): 32 U/L (06-25-22 @ 07:19)  Aspartate Aminotransferase (AST/SGOT): 31 U/L (06-24-22 @ 07:20)  Bilirubin Total, Serum: 0.4 mg/dL (06-25-22 @ 07:19)  Bilirubin Total, Serum: 0.5 mg/dL (06-24-22 @ 07:20)

## 2022-06-28 NOTE — PROGRESS NOTE ADULT - SUBJECTIVE AND OBJECTIVE BOX
CHIEF COMPLAINT/INTERVAL HISTORY:    Patient is a 88y old  Male who presents with a chief complaint of Recurrent Fevers (28 Jun 2022 12:35)    SUBJECTIVE & OBJECTIVE: Pt seen and examined at bedside. Fever yesterday evening. Low grade temp today but recultured. Repeat CT chest also ordered. ID on board.    ROS: Denies chest pain or SOB; limited due to cognition    ICU Vital Signs Last 24 Hrs  T(C): 38.2 (28 Jun 2022 16:45), Max: 38.4 (27 Jun 2022 22:30)  T(F): 100.8 (28 Jun 2022 16:45), Max: 101.1 (27 Jun 2022 22:30)  HR: 59 (28 Jun 2022 16:45) (55 - 61)  BP: 130/46 (28 Jun 2022 16:45) (124/53 - 158/73)  RR: 19 (28 Jun 2022 16:45) (18 - 19)  SpO2: 96% (28 Jun 2022 16:45) (95% - 100%)    MEDICATIONS  (STANDING):  aspirin  chewable 81 milliGRAM(s) Oral daily  atorvastatin 80 milliGRAM(s) Oral at bedtime  chlorhexidine 2% Cloths 1 Application(s) Topical daily  epoetin juan-epbx (RETACRIT) Injectable 44951 Unit(s) IV Push <User Schedule>  heparin   Injectable 5000 Unit(s) SubCutaneous every 12 hours  ibuprofen  Suspension. 400 milliGRAM(s) Oral <User Schedule>  isosorbide   mononitrate ER Tablet (IMDUR) 30 milliGRAM(s) Oral daily  lidocaine   4% Patch 1 Patch Transdermal every 24 hours  Nephro-pito 1 Tablet(s) Oral at bedtime  pantoprazole    Tablet 40 milliGRAM(s) Oral before breakfast  QUEtiapine 25 milliGRAM(s) Oral at bedtime  sevelamer carbonate 800 milliGRAM(s) Oral every 8 hours  tamsulosin 0.4 milliGRAM(s) Oral at bedtime  traZODone 50 milliGRAM(s) Oral at bedtime    MEDICATIONS  (PRN):  acetaminophen     Tablet .. 650 milliGRAM(s) Oral every 6 hours PRN Temp greater or equal to 38C (100.4F), Mild Pain (1 - 3)  ondansetron Injectable 4 milliGRAM(s) IV Push every 8 hours PRN Nausea and/or Vomiting      LABS:                        10.3   8.41  )-----------( 368      ( 28 Jun 2022 07:15 )             34.3     06-28    141  |  98  |  73.2<H>  ----------------------------<  136<H>  5.6<H>   |  24.0  |  5.51<H>    Ca    10.1      28 Jun 2022 07:15  Phos  4.4     06-28  Mg     2.4     06-28    PHYSICAL EXAM:    GENERAL: elderly male, sitting in chair, not in acute distress  HEAD:  Atraumatic, Normocephalic  EYES: EOMI, PERRLA, conjunctiva and sclera clear  ENMT: Moist mucous membranes  NECK: Supple   NERVOUS SYSTEM:  Alert & Oriented X2, intermittent drowsiness  CHEST/LUNG: coarse breath sounds  HEART: Regular rate and rhythm; + S1/S2  ABDOMEN: Soft, Nontender, Nondistended; Bowel sounds present  EXTREMITIES:  no RLE edema, left AKA stump with staples in tact and no wound dehiscence

## 2022-06-28 NOTE — PROGRESS NOTE ADULT - ASSESSMENT
This 88 y.o. M with PMHX ESRD/HD on Mondays and Fridays ([place]), Hx GIB 2/2 AVM, CAD, HTN, HLD, PPM, CHFpEF, PAD s/p L Fem Bypass with cryovein c/b acute graft rethrombosis s/p thrombectomy now s/p L AKA discharged 2 days ago from Vascular Service after SICU admission for Hemorrhagic Shock s/p L AKA on Augmentin for "FUO". Patient brought back to [facility] ER for recurrent fevers despite abx with Augmentin and delirium. CXR from prior hospitalization with blunted costophrenic angles.   COVID/RVP Negative . Pt evaluated by Vascular Surgery for L AKA in ER. CT Surgery consulted for effusion possible parapneumonic effusion. ID consulted. Discussed plan of care with daughter and grandson at bedside. DNR/DNI confirmed but want all other medical and surgical interventions.     recently discharged 6/16/22 per daughter, had low grade fever at that time.   at home progressively decline.  EMS was called, evaluated the patient, and brought to hospital.     fevers noted here.   CT scan showed:  1.  Bilateral pleural effusions (right greater than left).  2.  Complete passive atelectasis of the right lower lobe.  3.  Bilateral linear atelectasis.  4.  Groundglass opacities and septal thickening are of uncertain etiology.  5.  Bilateral pulmonary nodules, some which have a branching morphology   are uncertain etiology. A follow-up is recommended in 4-6 weeks to   evaluate for resolution/change        Impression  fevers  weakness  pleural effusions  ESRD on HD      Plan:  initial workup for fever negative     repeat fevers 101.9 on 6/23 at 4AM; and fever  102.1 (23 Jun 2022 20:00)    Still with fevers; workup in process-  - nontoxic  -  RVP (-)  - swallow eval ?aspiration, ct a/p doppler RLE r/o DVT, repeat BCX x 2  -  ON  Merrem 500mg Q12H      Repeat CT chest 6/23/22:  Right pleural effusion has decreased when compared to   previous exam.    Multiple very small nodules are present in the peripheral aspect of both   lungs. Follow-up CT scan is recommended in 3 months to ensure resolution.      s/p drainage of pleural effusion 6/19/22   cultures from pleural fluid, so far is negative   - s/p removal of Right chest tube 6/22/22   PT WITH FEVERS  CONTINUED INTERMITTENT FEVERS DESPITE   BROAD SPECTRUM ABX  ABX DISCONTINUED  LAST DOSE WAS ON 6/26  IF SPIKES AGAIN WOULD REPEAT BLOOD CX X2 SETS     IF SPIKES AGAIN WOULD ALSO REPEAT CT CHEST   TO ASSESS IF PLEURAL FLUID REACCUMULATING  SPOKE TO DAUGHTER ON THE PHONE

## 2022-06-28 NOTE — PROGRESS NOTE ADULT - ASSESSMENT
Patient is a 88 year old male with PMH of ESRD on HD ([facility]), Hx GIB 2/2 AVM, CAD, HTN, HLD, PPM, CHFpEF, PAD s/p L Fem Bypass with cryovein c/b acute graft rethrombosis s/p thrombectomy now s/p L AKA discharged 2 days ago from Vascular Service after SICU admission for Hemorrhagic Shock s/p L AKA on Augmentin for "FUO" discharged two days admitted for Recurrent Fevers and AMS r/o Pleural Effusion/Parapneumonic Effusions.    #Acute Hypoxic Respiratory Failure due to Pleural Effusions  -hypoxia resolved; taken off O2  -s/p right chest tube which was removed on 6/23  -repeat CT chest with stable effusion and ground glass opacities consistent with PNA  -Repeat CT chest as outpatient for pulmonary nodule surveillance  -pleural fluid culture and cytology negative  -ultrafiltration and torsemide to help facilitate fluid removal  -CT surgery recs appreciated  -ID recommending to repeat CT chest again today to evaluate recurrent effusion or worsening PNA    #Fevers; unclear etiology   -CT chest reporting ground glass opacities consistent with PNA   -pleural fluid culture negative  -repeat blood cultures negative  -UA negative  -RLE duplex negative for acute thrombosis; but reports mural calcifications vs chronic thrombosis (not a candidate for AC)  -CT abd/pelvis negative for acute pathology  -repeat COVID and RVP negative  -continue empiric merrem  -no clinical concerns meningitis therefore LP deferred   -skin in tact and stump without concerns for infection  -no concerns for line sepsis or endocarditis    -WBC remains normal   -tick panel pending  -EBV ab positive but negative for IgM  -monitor fever curve closely; recultured today  -continue to observe off abx  -MBS without concerns for aspiration   -repeat CT chest as above  -ID follow up appreciated    #PAD s/p L Fem Bypass with cryovein c/b acute graft rethrombosis s/p thrombectomy now s/p L AKA  stump site clean without acute concerns for infection  Continue aspirin and statin  Analgesia as needed  Vascular Surgery recs appreciated; staples to be removed prior to discharge    #Hx LLE DVT  Eliquis Held during last hospitalization due to hemorrhagic shock  RLE duplex negative for acute DVT  continue DVT ppx    #ESRD on HD (TTS)  Uneventful HD today; UF goal per renal  Midodrine to assist with UF  Continue torsemide  Continue Nephrovite daily  Continue Sevelamer TID with meals  Strict I/os, daily weights  Avoid nephrotoxic agents and renally dose medications for eGFR < 10  Renal recs appreciated    #Chronic Diastolic Heart Failure  Appears euvolemic; UF goal per renal  Continue Torsemide  Continue Imdur  Fluid restriction  strict I/os, daily weights    #Intermittent Delirium likely in the setting of fevers and hospital confinement   Family reports patient has been increasingly getting more forgetful in the past few months likely due to cognitive impairment at baseline  No meningeal signs or clinical concerns for meningitis as discussed with ID  CT head deferred per family's request since mentation fluctuation   Continue Melatonin 3mg, Trazodone 50 mg and Seroquel 25mg     # CAD  -continue aspirin and statin    #HLD  -continue statin    #HTN  -BP stable on midodrine; wean as tolerated  -continue torsemide and imdur  -low sodium diet    #Constipation  - resolved    #BPH  -continue flomax    #Anemia of chronic kidney disease  -Hb noted  -iron studies reviewed  -continue procrit  -monitor CBC closely    DVT ppx - Heparin SC    Dispo: Remains acute; recultured due to fever today. Repeat CT chest. PT evaluation.    Plan discussed with patient, daughter, Dr. Bryan, medicine NP, RN

## 2022-06-29 NOTE — PROGRESS NOTE ADULT - SUBJECTIVE AND OBJECTIVE BOX
INFECTIOUS DISEASES AND INTERNAL MEDICINE at [place]  =======================================================  Pawan Short MD  Diplomates [employer] Board of Internal Medicine and Infectious Diseases  Telephone [phone]  Fax            [phone]  =======================================================    CHRISTIANO ELIZABETH [id number]    Follow up: FEVERS  NOW AFEBRILE     Allergies:  Plavix (Hives)  Toprol-XL (Rash)      Medications:  acetaminophen     Tablet .. 650 milliGRAM(s) Oral every 6 hours PRN  aspirin  chewable 81 milliGRAM(s) Oral daily  atorvastatin 80 milliGRAM(s) Oral at bedtime  chlorhexidine 2% Cloths 1 Application(s) Topical daily  epoetin juan-epbx (RETACRIT) Injectable 02364 Unit(s) IV Push <User Schedule>  heparin   Injectable 5000 Unit(s) SubCutaneous every 12 hours  isosorbide   mononitrate ER Tablet (IMDUR) 30 milliGRAM(s) Oral daily  lidocaine   4% Patch 1 Patch Transdermal every 24 hours     Nephro-pito 1 Tablet(s) Oral at bedtime  ondansetron Injectable 4 milliGRAM(s) IV Push every 8 hours PRN  pantoprazole    Tablet 40 milliGRAM(s) Oral before breakfast  QUEtiapine 25 milliGRAM(s) Oral at bedtime  sevelamer carbonate 800 milliGRAM(s) Oral every 8 hours  tamsulosin 0.4 milliGRAM(s) Oral at bedtime  traZODone 50 milliGRAM(s) Oral at bedtime    SOCIAL       FAMILY   FAMILY HISTORY:  Family history of cancer (Grandparent)    Family history of lung cancer (Grandparent)    Family history of premature CAD    FH: type 2 diabetes      REVIEW OF SYSTEMS:  CONFUSED UNABLE TO OBTAIN           Physical Exam:  I Vital Signs Last 24 Hrs  T(C): 36.7 (29 Jun 2022 08:18), Max: 38.2 (28 Jun 2022 16:45)  T(F): 98 (29 Jun 2022 08:18), Max: 100.8 (28 Jun 2022 16:45)  HR: 55 (29 Jun 2022 08:18) (52 - 61)  BP: 138/62 (29 Jun 2022 08:18) (106/56 - 138/62)  BP(mean): 87 (29 Jun 2022 08:18) (87 - 87)  RR: 20 (29 Jun 2022 08:18) (17 - 20)  SpO2: 98% (29 Jun 2022 08:18) (95% - 100%)    GEN: NAD,   HEENT: normocephalic and atraumatic. EOMI. DAISY.    NECK: Supple. No carotid bruits.  No lymphadenopathy or thyromegaly.  LUNGS: Clear to auscultation.  HEART: Regular rate and rhythm without murmur.  ABDOMEN: Soft, nontender, and nondistended.  Positive bowel sounds.    : No CVA tenderness  EXTREMITIES: LEFT AKA SITE LOOKS CLEAN   MSK: no joint swelling  NEUROLOGIC: CONFUSED          Labs:  Vitals:  ==     =======================================================  Current Antibiotics:  meropenem  IVPB 500 milliGRAM(s) IV Intermittent every 24 hours    Other medications:  aspirin  chewable 81 milliGRAM(s) Oral daily  atorvastatin 80 milliGRAM(s) Oral at bedtime  chlorhexidine 2% Cloths 1 Application(s) Topical daily  epoetin juan-epbx (RETACRIT) Injectable 27866 Unit(s) IV Push <User Schedule>  heparin   Injectable 5000 Unit(s) SubCutaneous every 12 hours  isosorbide   mononitrate ER Tablet (IMDUR) 30 milliGRAM(s) Oral daily  lidocaine   4% Patch 1 Patch Transdermal every 24 hours  multivitamin 1 Tablet(s) Oral daily  Nephro-pito 1 Tablet(s) Oral at bedtime  pantoprazole    Tablet 40 milliGRAM(s) Oral before breakfast  QUEtiapine 25 milliGRAM(s) Oral at bedtime  sevelamer carbonate 800 milliGRAM(s) Oral every 8 hours  tamsulosin 0.4 milliGRAM(s) Oral at bedtime  traZODone 50 milliGRAM(s) Oral at bedtime      =======================================================  Labs:                                9.8    7.56  )-----------( 346      ( 29 Jun 2022 07:21 )             32.3   06-29    141  |  99  |  42.1<H>  ----------------------------<  102<H>  4.0   |  27.0  |  3.37<H>    Ca    9.6      29 Jun 2022 07:21  Phos  3.8     06-29  Mg     2.1     06-29            Culture - Blood (collected 06-25-22 @ 16:34)  Source: .Blood Blood-Peripheral    Culture - Blood (collected 06-25-22 @ 16:34)  Source: .Blood Blood-Peripheral    Culture - Blood (collected 06-23-22 @ 11:16)  Source: .Blood Blood-Peripheral    Culture - Blood (collected 06-23-22 @ 11:16)  Source: .Blood Blood-Peripheral    Culture - Fungal, Body Fluid (collected 06-19-22 @ 18:21)  Source: .Body Fluid Pleural Fluid    Culture - Body Fluid with Gram Stain (collected 06-19-22 @ 18:21)  Source: .Body Fluid Pleural Fluid  Gram Stain (06-19-22 @ 23:08):    polymorphonuclear leukocytes seen    No organisms seen    by cytocentrifuge  Final Report (06-24-22 @ 17:14):    No growth at 5 days    Culture - Blood (collected 06-19-22 @ 03:11)  Source: .Blood Blood-Peripheral  Final Report (06-24-22 @ 04:00):    No Growth Final    Culture - Blood (collected 06-19-22 @ 03:08)  Source: .Blood Blood-Peripheral  Final Report (06-24-22 @ 04:00):    No Growth Final    Culture - Blood (collected 06-14-22 @ 05:44)  Source: .Blood Blood  Final Report (06-19-22 @ 06:00):    No Growth Final    Culture - Blood (collected 06-14-22 @ 05:44)  Source: .Blood Blood  Final Report (06-19-22 @ 06:00):    No Growth Final      Creatinine, Serum: 3.21 mg/dL (06-26-22 @ 06:56)  Creatinine, Serum: 4.76 mg/dL (06-25-22 @ 07:19)  Creatinine, Serum: 3.56 mg/dL (06-24-22 @ 07:20)  Creatinine, Serum: 4.23 mg/dL (06-23-22 @ 07:01)    Procalcitonin, Serum: 0.79 ng/mL (06-26-22 @ 06:56)  Procalcitonin, Serum: 0.79 ng/mL (06-26-22 @ 06:56)  Procalcitonin, Serum: 1.37 ng/mL (06-19-22 @ 07:37)      Ferritin, Serum: 2637 ng/mL (06-26-22 @ 06:56)      WBC Count: 6.51 K/uL (06-26-22 @ 06:56)  WBC Count: 8.50 K/uL (06-25-22 @ 07:19)  WBC Count: 6.50 K/uL (06-24-22 @ 07:20)  WBC Count: 6.31 K/uL (06-23-22 @ 07:01)    SARS-CoV-2: NotDetec (06-25-22 @ 16:28)  COVID-19 PCR: NotDetec (06-25-22 @ 06:24)  SARS-CoV-2: NotDetec (06-18-22 @ 17:24)  COVID-19 PCR: NotDetec (06-13-22 @ 06:58)  COVID-19 PCR: NotDetec (06-03-22 @ 18:58)  COVID-19 PCR: NotDetec (05-31-22 @ 04:59)    Lactate Dehydrogenase, Serum: 206 U/L (06-20-22 @ 07:07)    Alkaline Phosphatase, Serum: 273 U/L (06-25-22 @ 07:19)  Alkaline Phosphatase, Serum: 311 U/L (06-24-22 @ 07:20)  Alanine Aminotransferase (ALT/SGPT): 23 U/L (06-25-22 @ 07:19)  Alanine Aminotransferase (ALT/SGPT): 20 U/L (06-24-22 @ 07:20)  Aspartate Aminotransferase (AST/SGOT): 32 U/L (06-25-22 @ 07:19)  Aspartate Aminotransferase (AST/SGOT): 31 U/L (06-24-22 @ 07:20)  Bilirubin Total, Serum: 0.4 mg/dL (06-25-22 @ 07:19)  Bilirubin Total, Serum: 0.5 mg/dL (06-24-22 @ 07:20)

## 2022-06-29 NOTE — PROGRESS NOTE ADULT - ASSESSMENT
Patient is a 88 year old male with PMH of ESRD on HD ([facility]), Hx GIB 2/2 AVM, CAD, HTN, HLD, PPM, CHFpEF, PAD s/p L Fem Bypass with cryovein c/b acute graft rethrombosis s/p thrombectomy now s/p L AKA discharged 2 days ago from Vascular Service after SICU admission for Hemorrhagic Shock s/p L AKA on Augmentin for "FUO" discharged two days admitted for Recurrent Fevers and AMS r/o Pleural Effusion/Parapneumonic Effusions.    #Acute Hypoxic Respiratory Failure due to Pleural Effusions  -hypoxia resolved; taken off O2  -s/p right chest tube which was removed on 6/23  -repeat CT chest with stable effusion and ground glass opacities consistent with PNA  -pleural fluid culture and cytology negative  -ultrafiltration and torsemide to help facilitate fluid removal  -CT surgery recs appreciated  -ID recommending to repeat CT chest again to evaluate recurrent effusion or worsening PNA    #Fevers; unclear etiology   -however, fever curve was overall downtrending; tmax 100.8 in the past 24 hours  -CT chest reporting ground glass opacities consistent with PNA   -pleural fluid culture negative  -repeat blood cultures negative  -UA negative  -RLE duplex negative for acute thrombosis; but reports mural calcifications vs chronic thrombosis (not a candidate for AC)  -CT abd/pelvis negative for acute pathology  -repeat COVID and RVP negative  -no clinical concerns meningitis therefore LP deferred   -skin in tact and stump without concerns for infection  -no concerns for line sepsis or endocarditis    -WBC remains normal   -tick panel pending  -EBV ab positive but negative for IgM  -monitor fever curve closely; f/u repeat cultures   -continue to observe off abx at this time  -MBS without concerns for aspiration   -repeat CT chest as above  -ID follow up appreciated    #PAD s/p L Fem Bypass with cryovein c/b acute graft rethrombosis s/p thrombectomy now s/p L AKA  stump site clean without acute concerns for infection  Continue aspirin and statin  Analgesia as needed  Vascular Surgery recs appreciated; staples to be removed prior to discharge    #Hx LLE DVT  Eliquis Held during last hospitalization due to hemorrhagic shock  RLE duplex negative for acute DVT  continue DVT ppx    #ESRD on HD (TTS)  Next HD tentatively scheduled for 6/30  Midodrine to assist with UF  Continue torsemide  Continue Nephrovite daily  Continue Sevelamer TID with meals  Strict I/os, daily weights  Avoid nephrotoxic agents and renally dose medications for eGFR < 10  Renal recs appreciated    #Chronic Diastolic Heart Failure  Appears euvolemic; UF goal per renal  Continue Torsemide  Continue Imdur  Fluid restriction  strict I/os, daily weights    #Intermittent Delirium likely in the setting of fevers and hospital confinement   Family reports patient has been increasingly getting more forgetful in the past few months likely due to cognitive impairment at baseline  No meningeal signs or clinical concerns for meningitis as discussed with ID  CT head deferred per family's request since mentation fluctuation   Continue Melatonin 3mg, Trazodone 50 mg and Seroquel 25mg     # CAD  -continue aspirin and statin    #HLD  -continue statin    #HTN  -BP stable on midodrine; wean as tolerated  -continue torsemide and imdur  -low sodium diet    #Constipation  - resolved    #BPH  -continue flomax    #Anemia of chronic kidney disease  -Hb noted  -iron studies reviewed  -continue procrit  -monitor CBC closely    #Chronic Pain Syndrome  -renew fentanyl and lidocaine patches    DVT ppx - Heparin SC    Dispo: Remains acute; repeat CT chest. Monitor fever curve. PT evaluation    Plan discussed with patient, daughter, Dr. Bryan, Dr. Avendano, medicine NP, RN

## 2022-06-29 NOTE — PROGRESS NOTE ADULT - ASSESSMENT
This 88 y.o. M with PMHX ESRD/HD on Mondays and Fridays ([facility]), Hx GIB 2/2 AVM, CAD, HTN, HLD, PPM, CHFpEF, PAD s/p L Fem Bypass with cryovein c/b acute graft rethrombosis s/p thrombectomy now s/p L AKA discharged 2 days ago from Vascular Service after SICU admission for Hemorrhagic Shock s/p L AKA on Augmentin for "FUO". Patient brought back to [facility] ER for recurrent fevers despite abx with Augmentin and delirium. CXR from prior hospitalization with blunted costophrenic angles.   COVID/RVP Negative . Pt evaluated by Vascular Surgery for L AKA in ER. CT Surgery consulted for effusion possible parapneumonic effusion. ID consulted. Discussed plan of care with daughter and grandson at bedside. DNR/DNI confirmed but want all other medical and surgical interventions.     recently discharged 6/16/22 per daughter, had low grade fever at that time.   at home progressively decline.  EMS was called, evaluated the patient, and brought to hospital.     fevers noted here.   CT scan showed:  1.  Bilateral pleural effusions (right greater than left).  2.  Complete passive atelectasis of the right lower lobe.  3.  Bilateral linear atelectasis.  4.  Groundglass opacities and septal thickening are of uncertain etiology.  5.  Bilateral pulmonary nodules, some which have a branching morphology   are uncertain etiology. A follow-up is recommended in 4-6 weeks to   evaluate for resolution/change        Impression  fevers  weakness  pleural effusions  ESRD on HD      Plan:  initial workup for fever negative     repeat fevers 101.9 on 6/23 at 4AM; and fever  102.1 (23 Jun 2022 20:00)    Still with fevers; workup in process-  - nontoxic  -  RVP (-)  - swallow eval ?aspiration, ct a/p doppler RLE r/o DVT, repeat BCX x 2  -  ON  Merrem 500mg Q12H      Repeat CT chest 6/23/22:  Right pleural effusion has decreased when compared to   previous exam.    Multiple very small nodules are present in the peripheral aspect of both   lungs. Follow-up CT scan is recommended in 3 months to ensure resolution.      s/p drainage of pleural effusion 6/19/22   cultures from pleural fluid, so far is negative   - s/p removal of Right chest tube 6/22/22   PT WITH FEVERS  CONTINUED INTERMITTENT FEVERS DESPITE   BROAD SPECTRUM ABX  ABX DISCONTINUED  LAST DOSE WAS ON 6/26  IF SPIKES AGAIN WOULD REPEAT BLOOD CX X2 SETS     I  REPEAT CT CHEST   IS PENDING   TO ASSESS IF PLEURAL FLUID REACCUMULATING  SPOKE TO DAUGHTER

## 2022-06-29 NOTE — PROGRESS NOTE ADULT - SUBJECTIVE AND OBJECTIVE BOX
Patient was seen and evaluated on dialysis.   No c/o CP SOB NV  no F/C  no swelling    T(C): 36.7 (06-29-22 @ 08:18), Max: 38.2 (06-28-22 @ 16:45)  HR: 55 (06-29-22 @ 08:18) (52 - 59)  BP: 138/62 (06-29-22 @ 08:18) (106/56 - 138/62)  Wt(kg): -- NA  PE ;  NAD  lungs - CTA  CV gr 1 murmur,  No gallop or rub  Abd : soft, NT BS +, No masses  Ext- No edema  Neuro : Grossly intact, moving extremities , Amputee,                        9.8    7.56  )-----------( 346      ( 29 Jun 2022 07:21 )             32.3     06-29    141  |  99  |  42.1<H>  ----------------------------<  102<H>  4.0   |  27.0  |  3.37<H>    Ca    9.6      29 Jun 2022 07:21  Phos  3.8     06-29  Mg     2.1     06-29    MEDICATIONS  (STANDING):  acetaminophen     Tablet .. PRN  aspirin  chewable  atorvastatin  chlorhexidine 2% Cloths  epoetin juan-epbx (RETACRIT) Injectable  fentaNYL   Patch  12 MICROgram(s)/Hr  heparin   Injectable  ibuprofen  Suspension.  isosorbide   mononitrate ER Tablet (IMDUR)  lidocaine   4% Patch  Nephro-pito  ondansetron Injectable PRN  pantoprazole    Tablet  QUEtiapine  sevelamer carbonate  tamsulosin  traZODone    Patient stable  Josh HD easily  Continue

## 2022-06-29 NOTE — PROGRESS NOTE ADULT - ASSESSMENT
Pt is seen and examined on dialysis. No symptoms. Hemodynamics stable. Tolerating dialysis and ultrafiltration.  Pre Laboratory values personally reviewed by me.  Dialysis adjusted appropriately based on current values.  Will continue the current medical management.  Next hemodialysis as scheduled.  Discussed with nursing, primary care team.     D/W Daughter,

## 2022-06-29 NOTE — PROGRESS NOTE ADULT - SUBJECTIVE AND OBJECTIVE BOX
CHIEF COMPLAINT/INTERVAL HISTORY:    Patient is a 88y old  Male who presents with a chief complaint of Recurrent Fevers (29 Jun 2022 15:35)    SUBJECTIVE & OBJECTIVE: Pt seen and examined at bedside. No overnight events. Patient OOB to chair; in good spirits. Tmax curve downtrending to 100.8 yesterday. CT chest pending.    ROS: No chest pain, palpitations, SOB, light headedness, dizziness, headache, nausea/vomiting, fevers/chills, abdominal pain, dysuria or increased urinary frequency.    ICU Vital Signs Last 24 Hrs  T(C): 36.7 (29 Jun 2022 08:18), Max: 38.2 (28 Jun 2022 16:45)  T(F): 98 (29 Jun 2022 08:18), Max: 100.8 (28 Jun 2022 16:45)  HR: 55 (29 Jun 2022 08:18) (52 - 59)  BP: 138/62 (29 Jun 2022 08:18) (106/56 - 138/62)  BP(mean): 87 (29 Jun 2022 08:18) (87 - 87)  RR: 20 (29 Jun 2022 08:18) (17 - 20)  SpO2: 98% (29 Jun 2022 08:18) (95% - 98%)    MEDICATIONS  (STANDING):  aspirin  chewable 81 milliGRAM(s) Oral daily  atorvastatin 80 milliGRAM(s) Oral at bedtime  chlorhexidine 2% Cloths 1 Application(s) Topical daily  epoetin juan-epbx (RETACRIT) Injectable 59450 Unit(s) IV Push <User Schedule>  fentaNYL   Patch  12 MICROgram(s)/Hr 1 Patch Transdermal every 72 hours  heparin   Injectable 5000 Unit(s) SubCutaneous every 12 hours  ibuprofen  Suspension. 400 milliGRAM(s) Oral <User Schedule>  isosorbide   mononitrate ER Tablet (IMDUR) 30 milliGRAM(s) Oral daily  lidocaine   4% Patch 1 Patch Transdermal every 24 hours  Nephro-pito 1 Tablet(s) Oral at bedtime  pantoprazole    Tablet 40 milliGRAM(s) Oral before breakfast  QUEtiapine 25 milliGRAM(s) Oral at bedtime  sevelamer carbonate 800 milliGRAM(s) Oral every 8 hours  tamsulosin 0.4 milliGRAM(s) Oral at bedtime  traZODone 50 milliGRAM(s) Oral at bedtime    MEDICATIONS  (PRN):  acetaminophen     Tablet .. 650 milliGRAM(s) Oral every 6 hours PRN Temp greater or equal to 38C (100.4F), Mild Pain (1 - 3)  ondansetron Injectable 4 milliGRAM(s) IV Push every 8 hours PRN Nausea and/or Vomiting      LABS:                        9.8    7.56  )-----------( 346      ( 29 Jun 2022 07:21 )             32.3     06-29    141  |  99  |  42.1<H>  ----------------------------<  102<H>  4.0   |  27.0  |  3.37<H>    Ca    9.6      29 Jun 2022 07:21  Phos  3.8     06-29  Mg     2.1     06-29    PHYSICAL EXAM:    GENERAL: elderly male, sitting in chair, not in acute distress  HEAD:  Atraumatic, Normocephalic  EYES: EOMI, PERRLA, conjunctiva and sclera clear  ENMT: Moist mucous membranes  NECK: Supple   NERVOUS SYSTEM:  Alert & Oriented X2, intermittent drowsiness  CHEST/LUNG: coarse breath sounds  HEART: Regular rate and rhythm; + S1/S2  ABDOMEN: Soft, Nontender, Nondistended; Bowel sounds present  EXTREMITIES:  no RLE edema, left AKA stump with staples in tact and no wound dehiscence

## 2022-06-30 NOTE — PROGRESS NOTE ADULT - SUBJECTIVE AND OBJECTIVE BOX
[facility]  INFECTIOUS DISEASES AND INTERNAL MEDICINE at [place] and [place]  =======================================================                               Jeet Bryan MD#  Mango Oliver MD*                                     Andrew Kaba MD*    Radha Short MD*            Diplomates American Board of Internal Medicine & Infectious Diseases                # [place] Office - Appt - Tel  [phone] Fax [phone]                * [place] Office - Appt - Tel [phone] Fax [phone]                                  Hospital Consult line:  [phone]  =======================================================    CHRISTIANO ELIZABETH [id number]    Follow up: Fever    Last fever to 102F on 6/29      Allergies:  Plavix (Hives)  Toprol-XL (Rash)       REVIEW OF SYSTEMS:  Unable to obtain. Patient is confused       Physical Exam:  GEN: NAD  HEENT: normocephalic and atraumatic. EOMI. PERRL.    NECK: Supple.   LUNGS: CTA B/L.  HEART: RRR  ABDOMEN: Soft, NT, ND.  +BS.    : No CVA tenderness  EXTREMITIES: Without  edema.  MSK: No joint swelling  NEUROLOGIC: awake, randomly moving   PSYCHIATRIC: confused   SKIN: Left AKA site intact       Vitals:  T(F): 98 (30 Jun 2022 11:59), Max: 102 (29 Jun 2022 18:12)  HR: 70 (30 Jun 2022 11:59)  BP: 150/80 (30 Jun 2022 11:59)  RR: 18 (30 Jun 2022 11:59)  SpO2: 94% (30 Jun 2022 11:59) (89% - 96%)  temp max in last 48H T(F): , Max: 102 (06-29-22 @ 18:12)      Current Antibiotics:    Other medications:  aspirin  chewable 81 milliGRAM(s) Oral daily  atorvastatin 80 milliGRAM(s) Oral at bedtime  chlorhexidine 2% Cloths 1 Application(s) Topical daily  epoetin juan-epbx (RETACRIT) Injectable 16971 Unit(s) IV Push <User Schedule>  fentaNYL   Patch  12 MICROgram(s)/Hr 1 Patch Transdermal every 72 hours  heparin   Injectable 5000 Unit(s) SubCutaneous every 12 hours  ibuprofen  Suspension. 400 milliGRAM(s) Oral <User Schedule>  isosorbide   mononitrate ER Tablet (IMDUR) 30 milliGRAM(s) Oral daily  lidocaine   4% Patch 1 Patch Transdermal every 24 hours  Nephro-pito 1 Tablet(s) Oral at bedtime  pantoprazole    Tablet 40 milliGRAM(s) Oral before breakfast  QUEtiapine 25 milliGRAM(s) Oral at bedtime  sevelamer carbonate 800 milliGRAM(s) Oral every 8 hours  tamsulosin 0.4 milliGRAM(s) Oral at bedtime  traZODone 50 milliGRAM(s) Oral at bedtime                            9.2    9.98  )-----------( 381      ( 30 Jun 2022 06:57 )             29.1     06-30    140  |  97<L>  |  63.4<H>  ----------------------------<  127<H>  4.4   |  23.0  |  4.41<H>    Ca    9.8      30 Jun 2022 06:57  Phos  4.0     06-30  Mg     2.2     06-30      RECENT CULTURES:  06-28 @ 21:27 .Blood Blood-Peripheral     No growth to date.    06-28 @ 21:25 .Blood Blood-Peripheral     No growth to date.    06-25 @ 16:34 .Blood Blood-Peripheral     No growth to date.    06-25 @ 16:28    [facility]    06-23 @ 11:16 .Blood Blood-Peripheral     No Growth Final    06-19 @ 18:21 .Body Fluid Pleural Fluid     No growth at 5 days  polymorphonuclear leukocytes seen  No organisms seen  by cytocentrifuge    06-19 @ 03:11 .Blood Blood-Peripheral     No Growth Final    06-19 @ 03:08 .Blood Blood-Peripheral     No Growth Final      WBC Count: 9.98 K/uL (06-30-22 @ 06:57)  WBC Count: 7.56 K/uL (06-29-22 @ 07:21)  WBC Count: 8.41 K/uL (06-28-22 @ 07:15)  WBC Count: 7.17 K/uL (06-27-22 @ 11:13)  WBC Count: 6.51 K/uL (06-26-22 @ 06:56)    Creatinine, Serum: 4.41 mg/dL (06-30-22 @ 06:57)  Creatinine, Serum: 3.37 mg/dL (06-29-22 @ 07:21)  Creatinine, Serum: 5.51 mg/dL (06-28-22 @ 07:15)  Creatinine, Serum: 3.21 mg/dL (06-26-22 @ 06:56)    Procalcitonin, Serum: 0.79 ng/mL (06-26-22 @ 06:56)  Procalcitonin, Serum: 0.79 ng/mL (06-26-22 @ 06:56)  Procalcitonin, Serum: 1.37 ng/mL (06-19-22 @ 07:37)     SARS-CoV-2: NotDetec (06-25-22 @ 16:28)  COVID-19 PCR: NotDetec (06-25-22 @ 06:24)  SARS-CoV-2: NotDetec (06-18-22 @ 17:24)  COVID-19 PCR: NotDetec (06-13-22 @ 06:58)  COVID-19 PCR: NotDetec (06-03-22 @ 18:58)      < from: CT Chest No Cont (06.29.22 @ 18:26) >    IMPRESSION:  Scattered ill-defined groundglass and nodular opacities are increased   from the prior study. These are indeterminate, possibly   infectious/inflammatory. Short-term follow-up CT needed for complete   evaluation.    --- End of Report ---    < end of copied text >        < from: CT Abdomen and Pelvis No Cont (06.26.22 @ 09:27) >    IMPRESSION:  1.  No evidence of intra-abdominal abscess.  2.  Stable RIGHT pleural effusion. Redemonstrated groundglass opacities   consistent with pneumonia.  3.  As seen on prior CT, multiple small nodules present; better seen on   prior CT. Prior CT recommended follow-up in 3 months to ensure resolution.  4.  Hepatic cirrhosis.  5.  Cholecystolithiasis without acute cholecystitis.    --- End of Report ---    < end of copied text >

## 2022-06-30 NOTE — PROGRESS NOTE ADULT - SUBJECTIVE AND OBJECTIVE BOX
Patient was seen and evaluated on dialysis.   Patient is tolerating the procedure well.   T(C): 36.7 (06-30-22 @ 11:59), Max: 38.9 (06-29-22 @ 18:12)  HR: 70 (06-30-22 @ 11:59) (62 - 112)  BP: 150/80 (06-30-22 @ 11:59) (107/71 - 150/80)  Continue dialysis: T Th S  Dialyzer: Revaclear 300         QB: 400 ml.,        QD: 500ml.,    Goal UF _As Josh., _ over _3_ Hours     S/P IV Contrast-Minimal,     #ESRD on HD (TTS)    Midodrine to assist with Planned UF  D/C  torsemide  Continue Nephrovite daily  Continue Sevelamer TID with meals  Strict I/os, daily weights  Avoid nephrotoxic agents ,

## 2022-06-30 NOTE — PROGRESS NOTE ADULT - SUBJECTIVE AND OBJECTIVE BOX
CHIEF COMPLAINT/INTERVAL HISTORY:    Patient is a 88y old  Male who presents with a chief complaint of Recurrent Fevers (30 Jun 2022 12:38)    SUBJECTIVE & OBJECTIVE: Pt seen and examined at bedside. Persistently febrile overnight and today. Confused today. CT of left AKA stump concerning for OM; will obtain MRI. Indium scan ordered, but will not get done until next week. Continue to observe off abx per ID.    ROS: Denies pain; limited due to confusion    ICU Vital Signs Last 24 Hrs  T(C): 37.1 (30 Jun 2022 14:55), Max: 38.9 (29 Jun 2022 18:12)  T(F): 98.8 (30 Jun 2022 14:55), Max: 102 (29 Jun 2022 18:12)  HR: 77 (30 Jun 2022 14:55) (62 - 109)  BP: 135/53 (30 Jun 2022 14:55) (107/71 - 150/80)  BP(mean): 83 (29 Jun 2022 18:03) (83 - 83)  RR: 17 (30 Jun 2022 14:55) (17 - 18)  SpO2: 95% (30 Jun 2022 14:55) (89% - 96%)        MEDICATIONS  (STANDING):  aspirin  chewable 81 milliGRAM(s) Oral daily  atorvastatin 80 milliGRAM(s) Oral at bedtime  chlorhexidine 2% Cloths 1 Application(s) Topical daily  epoetin juan-epbx (RETACRIT) Injectable 73044 Unit(s) IV Push <User Schedule>  fentaNYL   Patch  12 MICROgram(s)/Hr 1 Patch Transdermal every 72 hours  heparin   Injectable 5000 Unit(s) SubCutaneous every 12 hours  ibuprofen  Suspension. 400 milliGRAM(s) Oral <User Schedule>  isosorbide   mononitrate ER Tablet (IMDUR) 30 milliGRAM(s) Oral daily  lactulose Syrup 10 Gram(s) Oral two times a day  lidocaine   4% Patch 1 Patch Transdermal every 24 hours  Nephro-pito 1 Tablet(s) Oral at bedtime  pantoprazole    Tablet 40 milliGRAM(s) Oral before breakfast  QUEtiapine 25 milliGRAM(s) Oral at bedtime  sevelamer carbonate 800 milliGRAM(s) Oral every 8 hours  tamsulosin 0.4 milliGRAM(s) Oral at bedtime  traZODone 50 milliGRAM(s) Oral at bedtime    MEDICATIONS  (PRN):  acetaminophen     Tablet .. 650 milliGRAM(s) Oral every 6 hours PRN Temp greater or equal to 38C (100.4F), Mild Pain (1 - 3)  ondansetron Injectable 4 milliGRAM(s) IV Push every 8 hours PRN Nausea and/or Vomiting      LABS:                        9.2    9.98  )-----------( 381      ( 30 Jun 2022 06:57 )             29.1     06-30    140  |  97<L>  |  63.4<H>  ----------------------------<  127<H>  4.4   |  23.0  |  4.41<H>    Ca    9.8      30 Jun 2022 06:57  Phos  4.0     06-30  Mg     2.2     06-30    PHYSICAL EXAM:    GENERAL: elderly male, sitting in chair, not in acute distress, weak, frail  HEAD:  Atraumatic, Normocephalic  EYES: EOMI, PERRLA, conjunctiva and sclera clear  ENMT: Moist mucous membranes  NECK: Supple   NERVOUS SYSTEM:  Alert & Oriented X1, confused  CHEST/LUNG: coarse breath sounds, right permacath in place  HEART: Regular rate and rhythm; + S1/S2  ABDOMEN: Soft, Nontender, Nondistended; Bowel sounds present  EXTREMITIES:  no RLE edema, left AKA stump with staples in tact and no wound dehiscence

## 2022-06-30 NOTE — PROGRESS NOTE ADULT - ASSESSMENT
Patient is a 88 year old male with PMH of ESRD on HD (TTS), Hx GIB 2/2 AVM, CAD, HTN, HLD, PPM, CHFpEF, PAD s/p L Fem Bypass with cryovein c/b acute graft rethrombosis s/p thrombectomy now s/p L AKA discharged 2 days ago from Vascular Service after SICU admission for Hemorrhagic Shock s/p L AKA on Augmentin for "FUO" discharged two days admitted for Recurrent Fevers and AMS and right pleural effusion.     #Acute Hypoxic Respiratory Failure due to Pleural Effusions  -hypoxia resolved; taken off O2  -s/p right chest tube which was removed on 6/23  -repeat CT chest with stable effusion and ground glass opacities consistent with PNA  -pleural fluid culture and cytology negative  -ultrafiltration and torsemide to help facilitate fluid removal  -CT surgery recs appreciated  -ID recommending to repeat CT chest again to evaluate recurrent effusion or worsening PNA    #Fevers; unclear etiology   -however, fever curve was overall downtrending; tmax 100.8 in the past 24 hours  -CT chest reporting ground glass opacities consistent with PNA   -pleural fluid culture negative  -repeat blood cultures negative  -UA negative  -RLE duplex negative for acute thrombosis; but reports mural calcifications vs chronic thrombosis (not a candidate for AC)  -CT abd/pelvis negative for acute pathology  -repeat COVID and RVP negative  -no clinical concerns meningitis therefore LP deferred   -skin in tact and stump without concerns for infection  -no concerns for line sepsis or endocarditis    -WBC remains normal   -tick panel pending  -EBV ab positive but negative for IgM  -monitor fever curve closely; f/u repeat cultures   -continue to observe off abx at this time  -MBS without concerns for aspiration   -repeat CT chest as above  -ID follow up appreciated    #PAD s/p L Fem Bypass with cryovein c/b acute graft rethrombosis s/p thrombectomy now s/p L AKA  stump site clean without acute concerns for infection  Continue aspirin and statin  Analgesia as needed  Vascular Surgery recs appreciated; staples to be removed prior to discharge    #Hx LLE DVT  Eliquis Held during last hospitalization due to hemorrhagic shock  RLE duplex negative for acute DVT  continue DVT ppx    #ESRD on HD (TTS)  Next HD tentatively scheduled for 6/30  Midodrine to assist with UF  Continue torsemide  Continue Nephrovite daily  Continue Sevelamer TID with meals  Strict I/os, daily weights  Avoid nephrotoxic agents and renally dose medications for eGFR < 10  Renal recs appreciated    #Chronic Diastolic Heart Failure  Appears euvolemic; UF goal per renal  Continue Torsemide  Continue Imdur  Fluid restriction  strict I/os, daily weights    #Intermittent Delirium likely in the setting of fevers and hospital confinement   Family reports patient has been increasingly getting more forgetful in the past few months likely due to cognitive impairment at baseline  No meningeal signs or clinical concerns for meningitis as discussed with ID  CT head deferred per family's request since mentation fluctuation   Continue Melatonin 3mg, Trazodone 50 mg and Seroquel 25mg     # CAD  -continue aspirin and statin    #HLD  -continue statin    #HTN  -BP stable on midodrine; wean as tolerated  -continue torsemide and imdur  -low sodium diet    #Constipation  - resolved    #BPH  -continue flomax    #Anemia of chronic kidney disease  -Hb noted  -iron studies reviewed  -continue procrit  -monitor CBC closely    #Chronic Pain Syndrome  -renew fentanyl and lidocaine patches    DVT ppx - Heparin SC    Dispo: Remains acute; repeat CT chest. Monitor fever curve. PT evaluation    Plan discussed with patient, daughter, Dr. Bryan, Dr. Avendano, medicine NP, RN   Patient is a 88 year old male with PMH of ESRD on HD (TTS), Hx GIB 2/2 AVM, CAD, HTN, HLD, PPM, CHFpEF, PAD s/p L Fem Bypass with cryovein c/b acute graft rethrombosis s/p thrombectomy now s/p L AKA discharged 2 days ago from Vascular Service after SICU admission for Hemorrhagic Shock s/p L AKA on Augmentin for "FUO" discharged two days admitted for Recurrent Fevers and AMS and right pleural effusion.       #Fevers; unclear etiology; rule out OM of left stump vs drug fever  -persistently febrile in the past 24 hours  -CT chest reporting ground glass opacities consistent with PNA   -pleural fluid culture negative  -repeat blood cultures negative  -UA negative  -RLE duplex negative for acute thrombosis; but reports mural calcifications vs chronic thrombosis (not a candidate for AC)  -Will check LLE duplex to rule out DVT as a cause of fevers  -CT of left stump with concerns for OM?; will discuss MRI with family to ensure no contraindication  -CT abd/pelvis negative for acute pathology  -repeat CT chest with increasing GGO; possibly due to fluid which is being managed with UF and diuretics  -COVID and RVP negative but will repeat  -repeat procal  -no clinical concerns meningitis therefore LP deferred   -skin in tact and stump without concerns for cellulitis  -no concerns for line sepsis or endocarditis given nontoxic state and lack of bacteremia   -WBC remains normal   -tick panel pending  -EBV ab positive but negative for IgM  -blood cultures off abx remain negative  -continue to observe off abx at this time per Dr. Bryan  -MBS without concerns for aspiration   -Tagged WBC scan ordered but can not be done until 7/5 since radioisotopes need to be ordered  -ID follow up appreciated; observe off abx unless hemodynamically unstable    #Acute Hypoxic Respiratory Failure due to Pleural Effusions  -hypoxia on 2 liters NC  -s/p right chest tube which was removed on 6/23  -repeat CT chest with stable effusion and ground glass opacities consistent with PNA  -repeat CT chest on 7/29 with increasing ground glass opacities but per radiology are not consistent with PNA ?  -pleural fluid culture and cytology negative  -ultrafiltration and torsemide to help facilitate fluid removal  -CT surgery recs appreciated  -per ID continue to monitor off abx    #PAD s/p L Fem Bypass with cryovein c/b acute graft rethrombosis s/p thrombectomy now s/p L AKA  stump site clean without acute concerns for infection  Continue aspirin and statin  Analgesia as needed  Vascular Surgery recs appreciated; staples to be removed prior to discharge    #Hx LLE DVT  Eliquis Held during last hospitalization due to hemorrhagic shock  RLE duplex negative for acute DVT  continue DVT ppx    #ESRD on HD (TTS)  Next HD tentatively scheduled for 6/30  Midodrine to assist with UF  Continue torsemide  Continue Nephrovite daily  Continue Sevelamer TID with meals  Strict I/os, daily weights  Avoid nephrotoxic agents and renally dose medications for eGFR < 10  Renal recs appreciated    #Chronic Diastolic Heart Failure  Appears euvolemic; UF goal per renal  Continue Torsemide  Continue Imdur  Fluid restriction  strict I/os, daily weights    #Intermittent Delirium likely in the setting of fevers and hospital confinement   Family reports patient has been increasingly getting more forgetful in the past few months likely due to cognitive impairment at baseline  No meningeal signs or clinical concerns for meningitis as discussed with ID  CT head deferred per family's request since mentation fluctuation   Continue Melatonin 3mg, Trazodone 50 mg and Seroquel 25mg    # CAD  -8 beats of NSVT; discussed with Dr. Jaime who recommended to continue telemonitoring and not add beta-blockers at this time  -continue aspirin and statin    #HLD  -continue statin    #HTN  -BP stable on midodrine; wean as tolerated  -continue torsemide and imdur  -low sodium diet    #Constipation  -CT with large stool load  -However, RN reports multiple episodes of BM today  -Defer adding laxative    #BPH  -continue flomax    #Anemia of chronic kidney disease  -Hb noted  -iron studies reviewed  -continue procrit  -monitor CBC closely    #Chronic Pain Syndrome  -continue fentanyl and lidocaine patches    DVT ppx - Heparin SC    Dispo: Remains acute; fever work up as above.    Plan discussed with patient, daughter, Dr. Bryan, Dr. Avendano, Dr.. Kaba, Dr. Jaime, Dr. Cast, medicine NP, RN and vascular PA

## 2022-06-30 NOTE — CHART NOTE - NSCHARTNOTEFT_GEN_A_CORE
Palliative care social work note    SW and palliative care physician Dr benitez met with patient whom palliative care team familiar with from prior admissions. patient pleasant and reports pain issues in back. Symptom management addressed and initiated discussion with patient regarding coping with back and forth to hospital. Ongoing discussions to address goals of care. palliative care following.
Patient has a [employer] Micra leadless pacemaker system implanted by Dr. Palma 10/29/2020  This device is MRI contingent and was interrogated by Dr. Palma on 6/21/22  Pacemaker dependent.   MRI form completed and placed in paper chart.
Patient seen  hist of persistent fevers   some concerns regarding possible left AKA infection     Left AKA site well healed. NO infectious signs  Primary team to order Non-con CT  - will follow up scan   - no plan for any additional intervention at this time   - will likely remove the staples prior to DC on on POD 30
Post chest tube removal CXR reviewed without pneumothorax.  Discussed with Dr. Reynolds. Thoracic surgery to sign off. Please reconsult PRN.
Source: Patient [ ]  Family [ ]   other [x ]    Current Diet: Diet, Soft and Bite Sized:   DASH/TLC {Sodium & Cholesterol Restricted} (DASH)  Supplement Feeding Modality:  Oral  Nepro Cans or Servings Per Day:  1       Frequency:  Three Times a day (06-25-22 @ 11:29) [Active]    PO intake:  < 50% [ ]   50-75%  [x ]   %  [ ]  other :    Current Weight:   (6/26) 111.9 lbs  (6/25) 114.6 lbs  (6/23) 118.8 lbs  (6/21) 121.2 lbs  (6/19) 122.4 lbs  Obtain daily wts, continue to monitor. Wts trending down (on HD).   No edema noted    Pertinent Medications: MEDICATIONS  (STANDING):  aspirin  chewable 81 milliGRAM(s) Oral daily  atorvastatin 80 milliGRAM(s) Oral at bedtime  chlorhexidine 2% Cloths 1 Application(s) Topical daily  epoetin juan-epbx (RETACRIT) Injectable 13634 Unit(s) IV Push <User Schedule>  heparin   Injectable 5000 Unit(s) SubCutaneous every 12 hours  isosorbide   mononitrate ER Tablet (IMDUR) 30 milliGRAM(s) Oral daily  lidocaine   4% Patch 1 Patch Transdermal every 24 hours  multivitamin 1 Tablet(s) Oral daily  Nephro-pito 1 Tablet(s) Oral at bedtime  pantoprazole    Tablet 40 milliGRAM(s) Oral before breakfast  QUEtiapine 25 milliGRAM(s) Oral at bedtime  sevelamer carbonate 800 milliGRAM(s) Oral every 8 hours  tamsulosin 0.4 milliGRAM(s) Oral at bedtime  traZODone 50 milliGRAM(s) Oral at bedtime    MEDICATIONS  (PRN):  acetaminophen     Tablet .. 650 milliGRAM(s) Oral every 6 hours PRN Temp greater or equal to 38C (100.4F), Mild Pain (1 - 3)  ondansetron Injectable 4 milliGRAM(s) IV Push every 8 hours PRN Nausea and/or Vomiting    Pertinent Labs: CBC Full  -  ( 26 Jun 2022 06:56 )  WBC Count : 6.51 K/uL  RBC Count : 3.34 M/uL  Hemoglobin : 9.9 g/dL  Hematocrit : 32.9 %  Platelet Count - Automated : 349 K/uL  Mean Cell Volume : 98.5 fl  Mean Cell Hemoglobin : 29.6 pg  Mean Cell Hemoglobin Concentration : 30.1 gm/dL  Auto Neutrophil # : 5.04 K/uL  Auto Lymphocyte # : 0.53 K/uL  Auto Monocyte # : 0.55 K/uL  Auto Eosinophil # : 0.21 K/uL  Auto Basophil # : 0.13 K/uL  Auto Neutrophil % : 77.5 %  Auto Lymphocyte % : 8.1 %  Auto Monocyte % : 8.4 %  Auto Eosinophil % : 3.2 %  Auto Basophil % : 2.0 %  -No recent labs    Skin: surgical incision, sacrum stage I pressure injury noted  Vlad: 14    Nutrition focused physical exam previously conducted - found signs of malnutrition [ ]absent [ x]present    Subcutaneous fat loss: [x ] Orbital fat pads region, [x ]Buccal fat region, [ ]Triceps region,  [ ]Ribs region    Muscle wasting: [x ]Temples region, [ x]Clavicle region, [x]Shoulder region, [ ]Scapula region, [ x]Interosseous region,  [ ]thigh region, [ ]Calf region    Estimated Needs:   [ x] no change since previous assessment  [ ] recalculated:     Current Nutrition Diagnosis: Pt remains at high nutrition risk and meets criteria for severe (chronic) malnutrition related to inability to consume sufficient protein energy intake with decreased appetite in setting of multiple comorbidities and hospitalizations as evidenced by pt meeting <75% est energy intake > 1 mo, severe muscle wasting/fat loss. Pt agitated during assessment, limited hx obtained at this time. Pt requires total assistance with meals, continues with fair po intake per documentation. Last documented BM 6/25. K+/Ca/phos WNL- sevelamer ordered TID. RD to remain available.     Recommendations:   1) Continue Nepro TID to optimize po intake and provide an additional 425 kcal, 19.1g protein per serving  2) Continue diet per SLP recommended consistency  3) Continue nephro-pito daily, d/c MVI. Continue sevelamer with meals.   4) Monitor wts and labs    Monitoring and Evaluation:   [x ] PO intake [x ] Tolerance to diet prescription [X] Weights  [X] Follow up per protocol [X] Labs:

## 2022-06-30 NOTE — PROGRESS NOTE ADULT - ASSESSMENT
8 y.o. M with PMHX ESRD/HD on Mondays and Fridays ([place]), Hx GIB 2/2 AVM, CAD, HTN, HLD, PPM, CHFpEF, PAD s/p L Fem Bypass with cryovein c/b acute graft rethrombosis s/p thrombectomy now s/p L AKA discharged 2 days ago from Vascular Service after SICU admission for Hemorrhagic Shock s/p L AKA on Augmentin for "FUO". Patient brought back to [facility] ER for recurrent fevers despite abx with Augmentin and delirium. CXR from prior hospitalization with blunted costophrenic angles.   COVID/RVP Negative . Pt evaluated by Vascular Surgery for L AKA in ER. CT Surgery consulted for effusion possible parapneumonic effusion. ID consulted. Discussed plan of care with daughter and grandson at bedside. DNR/DNI confirmed but want all other medical and surgical interventions.     recently discharged 6/16/22 per daughter, had low grade fever at that time.   at home progressively decline.  EMS was called, evaluated the patient, and brought to hospital.     fevers noted here.   CT scan showed:  1.  Bilateral pleural effusions (right greater than left).  2.  Complete passive atelectasis of the right lower lobe.  3.  Bilateral linear atelectasis.  4.  Groundglass opacities and septal thickening are of uncertain etiology.  5.  Bilateral pulmonary nodules, some which have a branching morphology   are uncertain etiology. A follow-up is recommended in 4-6 weeks to   evaluate for resolution/change        Fevers   fevers  weakness  pleural effusions  ESRD on HD      - Blood cultures 6/19, 6/23, 6/25, 6/28 all no growth   - Pleural fluid culture 6/19 no growth   - RVP/COVID 19 PCR negative on multiple occasions   - Procalcitonin level level is not elevated in setting of ESRD which is indicative of no infection   - CT Chest 6/29 reporting infectious vs inflammatory opacities   - CT A/P 6/26 reporting pleural effusion   - US Duples RLE with no DVT on 6/27  - Continue to monitor off antibiotics  - Check US duplex LLE to r/o DVT as cause of fever  - If no DVT need to r/o drug fever   - Check ESR, CRP, SPARKLE  - May need rheumatology work up  - Trend Fever  - Trend WBC      Will Follow      d/w Dr Rodgers

## 2022-06-30 NOTE — PROGRESS NOTE ADULT - SUBJECTIVE AND OBJECTIVE BOX
CT reviewed    Based on clinical exam, no cellulitis   Wound healing well, with staples  no drainable collection    - No plan for any intervention

## 2022-06-30 NOTE — PROGRESS NOTE ADULT - ASSESSMENT
8 y.o. M with PMHX ESRD/HD on Mondays and Fridays ([place]athi), Hx GIB 2/2 AVM, CAD, HTN, HLD, PPM, CHFpEF, PAD s/p L Fem Bypass with cryovein c/b acute graft rethrombosis s/p thrombectomy now s/p L AKA discharged 2 days ago from Vascular Service after SICU admission for Hemorrhagic Shock s/p L AKA on Augmentin for "FUO". Patient brought back to [facility] ER for recurrent fevers despite abx with Augmentin and delirium. CXR from prior hospitalization with blunted costophrenic angles.   COVID/RVP Negative . Pt evaluated by Vascular Surgery for L AKA in ER. CT Surgery consulted for effusion possible parapneumonic effusion. ID consulted. Discussed plan of care with daughter and grandson at bedside. DNR/DNI confirmed but want all other medical and surgical interventions.     Recently discharged 6/16/22 per daughter, had low grade fever at that time.   at home progressively decline.  EMS was called, evaluated the patient, and brought to hospital.     fevers noted here.   CT scan showed:  1.  Bilateral pleural effusions (right greater than left).  2.  Complete passive atelectasis of the right lower lobe.  3.  Bilateral linear atelectasis.  4.  Ground glass opacities and septal thickening are of uncertain etiology.  5.  Bilateral pulmonary nodules, some which have a branching morphology   are uncertain etiology. A follow-up is recommended in 4-6 weeks to   evaluate for resolution/change    Nocturnal fever,   pleural effusions  ESRD on HD    - Blood cultures 6/19, 6/23, 6/25, 6/28 all no growth   - Pleural fluid culture 6/19 no growth   - RVP/COVID 19 PCR negative on multiple occasions   - Procalcitonin level  is not elevated in setting of ESRD which is indicative of no infection   - CT Chest 6/29 reporting infectious vs inflammatory opacities      - ? Rheumatology work up - Per ID,     Will Follow    D/W  Dr Rodgers

## 2022-07-01 NOTE — PROGRESS NOTE ADULT - SUBJECTIVE AND OBJECTIVE BOX
[facility]  INFECTIOUS DISEASES AND INTERNAL MEDICINE at [place] and [place]  =======================================================                               Jeet Bryan MD#  Mango Oliver MD*                                     Andrew Kaba MD*    Radha Short MD*            Diplomates American Board of Internal Medicine & Infectious Diseases                # [place] Office - Appt - Tel  [phone] Fax [phone]                * [place] Office - Appt - Tel [phone] Fax [phone]                                  Hospital Consult line:  [phone]  =======================================================    CHRISTIANO ELIZABETH [id number]    Follow up: Fevers    Last fever to 102F on 6/29      Allergies:  Plavix (Hives)  Toprol-XL (Rash)       REVIEW OF SYSTEMS:  Unable to obtain. Patient is confused       Physical Exam:  GEN: NAD  HEENT: normocephalic and atraumatic. EOMI. PERRL.    NECK: Supple.   LUNGS: CTA B/L.  HEART: RRR  ABDOMEN: Soft, NT, ND.  +BS.    : No CVA tenderness  EXTREMITIES: Without  edema.  MSK: No joint swelling  NEUROLOGIC: awake, randomly moving   PSYCHIATRIC: confused   SKIN: Left AKA site intact  NO ERYTHEMA NO WARMTH      Vitals:  T Vital Signs Last 24 Hrs  T(C): 37.3 (01 Jul 2022 13:25), Max: 38.2 (30 Jun 2022 19:16)  T(F): 99.2 (01 Jul 2022 13:25), Max: 100.8 (30 Jun 2022 21:53)  HR: 60 (01 Jul 2022 10:22) (55 - 77)  BP: 144/50 (01 Jul 2022 10:22) (135/53 - 164/60)  BP(mean): --  RR: 16 (01 Jul 2022 10:22) (16 - 18)  SpO2: 95% (01 Jul 2022 10:22) (95% - 97%)      Current Antibiotics:    Other medications:  aspirin  chewable 81 milliGRAM(s) Oral daily  atorvastatin 80 milliGRAM(s) Oral at bedtime  chlorhexidine 2% Cloths 1 Application(s) Topical daily  epoetin juan-epbx (RETACRIT) Injectable 82070 Unit(s) IV Push <User Schedule>  fentaNYL   Patch  12 MICROgram(s)/Hr 1 Patch Transdermal every 72 hours  heparin   Injectable 5000 Unit(s) SubCutaneous every 12 hours  ibuprofen  Suspension. 400 milliGRAM(s) Oral <User Schedule>  isosorbide   mononitrate ER Tablet (IMDUR) 30 milliGRAM(s) Oral daily  lidocaine   4% Patch 1 Patch Transdermal every 24 hours  Nephro-pito 1 Tablet(s) Oral at bedtime  pantoprazole    Tablet 40 milliGRAM(s) Oral before breakfast  QUEtiapine 25 milliGRAM(s) Oral at bedtime  sevelamer carbonate 800 milliGRAM(s) Oral every 8 hours  tamsulosin 0.4 milliGRAM(s) Oral at bedtime  traZODone 50 milliGRAM(s) Oral at bedtime     --                       10.6   8.04  )-----------( 413      ( 01 Jul 2022 09:17 )             35.3   07-01    141  |  98  |  44.5<H>  ----------------------------<  142<H>  4.6   |  28.0  |  3.42<H>    Ca    10.3<H>      01 Jul 2022 09:17  Phos  3.4     07-01  Mg     2.3     07-01        RECENT CULTURES:  06-28 @ 21:27 .Blood Blood-Peripheral     No growth to date.    06-28 @ 21:25 .Blood Blood-Peripheral     No growth to date.    06-25 @ 16:34 .Blood Blood-Peripheral     No growth to date.    06-25 @ 16:28    [facility]    06-23 @ 11:16 .Blood Blood-Peripheral     No Growth Final    06-19 @ 18:21 .Body Fluid Pleural Fluid     No growth at 5 days  polymorphonuclear leukocytes seen  No organisms seen  by cytocentrifuge    06-19 @ 03:11 .Blood Blood-Peripheral     No Growth Final    06-19 @ 03:08 .Blood Blood-Peripheral     No Growth Final      WBC Count: 9.98 K/uL (06-30-22 @ 06:57)  WBC Count: 7.56 K/uL (06-29-22 @ 07:21)  WBC Count: 8.41 K/uL (06-28-22 @ 07:15)  WBC Count: 7.17 K/uL (06-27-22 @ 11:13)  WBC Count: 6.51 K/uL (06-26-22 @ 06:56)    Creatinine, Serum: 4.41 mg/dL (06-30-22 @ 06:57)  Creatinine, Serum: 3.37 mg/dL (06-29-22 @ 07:21)  Creatinine, Serum: 5.51 mg/dL (06-28-22 @ 07:15)  Creatinine, Serum: 3.21 mg/dL (06-26-22 @ 06:56)    Procalcitonin, Serum: 0.79 ng/mL (06-26-22 @ 06:56)  Procalcitonin, Serum: 0.79 ng/mL (06-26-22 @ 06:56)  Procalcitonin, Serum: 1.37 ng/mL (06-19-22 @ 07:37)     SARS-CoV-2: NotDetec (06-25-22 @ 16:28)  COVID-19 PCR: NotDetec (06-25-22 @ 06:24)  SARS-CoV-2: NotDetec (06-18-22 @ 17:24)  COVID-19 PCR: NotDetec (06-13-22 @ 06:58)  COVID-19 PCR: NotDetec (06-03-22 @ 18:58)      < from: CT Chest No Cont (06.29.22 @ 18:26) >    IMPRESSION:  Scattered ill-defined groundglass and nodular opacities are increased   from the prior study. These are indeterminate, possibly   infectious/inflammatory. Short-term follow-up CT needed for complete   evaluation.    --- End of Report ---    < end of copied text >        < from: CT Abdomen and Pelvis No Cont (06.26.22 @ 09:27) >    IMPRESSION:  1.  No evidence of intra-abdominal abscess.  2.  Stable RIGHT pleural effusion. Redemonstrated groundglass opacities   consistent with pneumonia.  3.  As seen on prior CT, multiple small nodules present; better seen on   prior CT. Prior CT recommended follow-up in 3 months to ensure resolution.  4.  Hepatic cirrhosis.  5.  Cholecystolithiasis without acute cholecystitis.    --- End of Report ---    < end of copied text >

## 2022-07-01 NOTE — PROGRESS NOTE ADULT - ASSESSMENT
Patient is a 88 year old male with PMH of ESRD on HD (TTS), Hx GIB 2/2 AVM, CAD, HTN, HLD, PPM, CHFpEF, PAD s/p L Fem Bypass with cryovein c/b acute graft rethrombosis s/p thrombectomy now s/p L AKA discharged 2 days ago from Vascular Service after SICU admission for Hemorrhagic Shock s/p L AKA on Augmentin for "FUO" discharged two days then readmitted for Recurrent Fevers and AMS in the setting of a right pleural effusion.     #Fevers; unclear etiology; rule out OM of left stump vs drug fever  -Tmax 101 in the past 24 hours off antibiotics  -pleural fluid culture negative  -repeat blood cultures off antibiotics negative  -CT chest reporting ground glass opacities consistent with PNA; although patient continued to spike despite broad spectrum abx  -UA negative  -CT abd/pelvis negative for acute pathology  -RLE duplex negative for acute thrombosis; but reports mural calcifications vs chronic thrombosis (not a candidate for AC)  -Will check LLE duplex to rule out DVT as a cause of fevers per ID recs  -CT of left stump with concerns for OM; MRI ordered  -repeat CT chest with increasing GGO; possibly due to fluid which is being managed with UF and diuretics  -COVID and RVP negative but will repeat COVID  -repeat procal noted   -no clinical concerns meningitis therefore LP deferred   -skin in tact and stump without concerns for cellulitis  -no concerns for line sepsis or endocarditis given nontoxic state and lack of bacteremia   -WBC remains normal   -tick panel pending  -EBV ab positive but negative for IgM  -continue to observe off abx at this time per Dr. Bryan  -Tagged WBC scan ordered but can not be done until 7/5 since radioisotopes need to be ordered  -ID follow up appreciated; observe off abx unless hemodynamically unstable and permacath may need to be removed if Temp spikes to 102 or higher     #Acute Hypoxic Respiratory Failure due to Pleural Effusions  -hypoxia resolved  -s/p right chest tube which was removed on 6/23  -repeat CT chest with stable effusion and ground glass opacities consistent with PNA  -repeat CT chest on 7/29 with increasing ground glass opacities but per radiologist are not consistent with PNA ?  -pleural fluid culture and cytology negative  -ultrafiltration and torsemide to help facilitate fluid removal  -MBS without concerns for aspiration   -CT surgery recs appreciated  -per ID continue to monitor off abx as above    #PAD s/p L Fem Bypass with cryovein c/b acute graft rethrombosis s/p thrombectomy now s/p L AKA  stump site clean without acute concerns for infection although CT with concerns for OM  MRI of left stump today  Continue aspirin and statin  Analgesia as needed  Vascular Surgery recs appreciated     #Hx LLE DVT  Eliquis Held during last hospitalization due to hemorrhagic shock  RLE duplex negative for acute DVT  left stump duplex pending  continue DVT ppx    #ESRD on HD (TTS)  Next HD tentatively scheduled for 7/2; UF goal per renal  Midodrine to assist with UF  Continue torsemide  Continue Nephrovite daily  Continue Sevelamer TID with meals   Monitor serum calcium levels; check PTH and vitamin D levels  Strict I/os, daily weights  Avoid nephrotoxic agents and renally dose medications for eGFR < 10  Renal recs appreciated    #Chronic Diastolic Heart Failure  Appears euvolemic; UF goal per renal  Continue Torsemide  Continue Imdur  Fluid restriction  strict I/os, daily weights    #Intermittent Delirium likely in the setting of fevers and hospital confinement   Family reports patient has been increasingly getting more forgetful in the past few months likely due to cognitive impairment at baseline  No meningeal signs or clinical concerns for meningitis as discussed with ID  CT head deferred per family's request since mentation fluctuation   Continue Melatonin 3mg, Trazodone 50 mg and Seroquel 25mg    # CAD  -8 beats of NSVT on 7/30 but no further events overnight or today  -discussed with Dr. Jaime on 7/30 who recommended to continue telemonitoring and defer beta-blockers at this time  -continue aspirin and statin    #HLD  -continue statin    #HTN  -BP stable on midodrine; wean as tolerated  -continue torsemide and imdur  -low sodium diet    #Constipation  -CT with large stool load  -However, RN reports multiple episodes of BM on 7/30  -No indication for laxative    #BPH  -continue flomax    #Anemia of chronic kidney disease  -Hb stable  -iron studies reviewed  -continue procrit  -monitor CBC closely    #Chronic Pain Syndrome  -continue fentanyl and lidocaine patches    DVT ppx - Heparin SC    Dispo: Remains acute; fever work up as above.    Plan discussed with patient, daughter, Dr. Bryan, medicine NP, RN

## 2022-07-01 NOTE — PROGRESS NOTE ADULT - SUBJECTIVE AND OBJECTIVE BOX
CHIEF COMPLAINT/INTERVAL HISTORY:    Patient is a 88y old  Male who presents with a chief complaint of Recurrent Fevers (30 Jun 2022 19:11)    SUBJECTIVE & OBJECTIVE: Pt seen and examined at bedside. No overnight events. Last fever 100.8 around 9 PM. MRI of left stump to rule out OM today.    ROS: Reports feeling tired today; denies pain. AAO x 2    ICU Vital Signs Last 24 Hrs  T(C): 36.6 (01 Jul 2022 10:22), Max: 38.3 (30 Jun 2022 13:30)  T(F): 97.8 (01 Jul 2022 10:22), Max: 101 (30 Jun 2022 13:30)  HR: 60 (01 Jul 2022 10:22) (55 - 77)  BP: 144/50 (01 Jul 2022 10:22) (135/53 - 164/60)  RR: 16 (01 Jul 2022 10:22) (16 - 18)  SpO2: 95% (01 Jul 2022 10:22) (95% - 97%)    MEDICATIONS  (STANDING):  aspirin  chewable 81 milliGRAM(s) Oral daily  atorvastatin 80 milliGRAM(s) Oral at bedtime  chlorhexidine 2% Cloths 1 Application(s) Topical daily  epoetin juan-epbx (RETACRIT) Injectable 68067 Unit(s) IV Push <User Schedule>  fentaNYL   Patch  12 MICROgram(s)/Hr 1 Patch Transdermal every 72 hours  heparin   Injectable 5000 Unit(s) SubCutaneous every 12 hours  ibuprofen  Suspension. 400 milliGRAM(s) Oral <User Schedule>  isosorbide   mononitrate ER Tablet (IMDUR) 30 milliGRAM(s) Oral daily  lidocaine   4% Patch 1 Patch Transdermal every 24 hours  LORazepam   Injectable 0.5 milliGRAM(s) IV Push once  Nephro-pito 1 Tablet(s) Oral at bedtime  pantoprazole    Tablet 40 milliGRAM(s) Oral before breakfast  QUEtiapine 25 milliGRAM(s) Oral at bedtime  sevelamer carbonate 800 milliGRAM(s) Oral every 8 hours  tamsulosin 0.4 milliGRAM(s) Oral at bedtime  traZODone 50 milliGRAM(s) Oral at bedtime    MEDICATIONS  (PRN):  acetaminophen     Tablet .. 650 milliGRAM(s) Oral every 6 hours PRN Temp greater or equal to 38C (100.4F), Mild Pain (1 - 3)  ondansetron Injectable 4 milliGRAM(s) IV Push every 8 hours PRN Nausea and/or Vomiting      LABS:                        10.6   8.04  )-----------( 413      ( 01 Jul 2022 09:17 )             35.3     07-01    141  |  98  |  44.5<H>  ----------------------------<  142<H>  4.6   |  28.0  |  3.42<H>    Ca    10.3<H>      01 Jul 2022 09:17  Phos  3.4     07-01  Mg     2.3     07-01    PHYSICAL EXAM:    GENERAL: elderly male, sitting in chair, not in acute distress, weak, frail  HEAD:  Atraumatic, Normocephalic  EYES: EOMI, PERRLA, conjunctiva and sclera clear  ENMT: Moist mucous membranes  NECK: Supple   NERVOUS SYSTEM:  Alert & Oriented X1-2, confused  CHEST/LUNG: coarse breath sounds, right permacath in place  HEART: Regular rate and rhythm; + S1/S2  ABDOMEN: Soft, Nontender, Nondistended; Bowel sounds present  EXTREMITIES:  no RLE edema, left AKA stump with staples in tact and no wound dehiscence

## 2022-07-01 NOTE — PROGRESS NOTE ADULT - ASSESSMENT
8 y.o. M with PMHX ESRD/HD on Mondays and Fridays ([place]), Hx GIB 2/2 AVM, CAD, HTN, HLD, PPM, CHFpEF, PAD s/p L Fem Bypass with cryovein c/b acute graft rethrombosis s/p thrombectomy now s/p L AKA discharged 2 days ago from Vascular Service after SICU admission for Hemorrhagic Shock s/p L AKA on Augmentin for "FUO". Patient brought back to [facility] ER for recurrent fevers despite abx with Augmentin and delirium. CXR from prior hospitalization with blunted costophrenic angles.   COVID/RVP Negative . Pt evaluated by Vascular Surgery for L AKA in ER. CT Surgery consulted for effusion possible parapneumonic effusion. ID consulted. Discussed plan of care with daughter and grandson at bedside. DNR/DNI confirmed but want all other medical and surgical interventions.     recently discharged 6/16/22 per daughter, had low grade fever at that time.   at home progressively decline.  EMS was called, evaluated the patient, and brought to hospital.     fevers noted here.   CT scan showed:  1.  Bilateral pleural effusions (right greater than left).  2.  Complete passive atelectasis of the right lower lobe.  3.  Bilateral linear atelectasis.  4.  Groundglass opacities and septal thickening are of uncertain etiology.  5.  Bilateral pulmonary nodules, some which have a branching morphology   are uncertain etiology. A follow-up is recommended in 4-6 weeks to   evaluate for resolution/change        Fevers   fevers  weakness  pleural effusions  ESRD on HD      - Blood cultures 6/19, 6/23, 6/25  , 6/28 all no growth  LAST DOSE OF ABX WAS 6/26   - Pleural fluid culture 6/19 no growth   - RVP/COVID 19 PCR negative on multiple occasions   - Procalcitonin level level is not elevated in setting of ESRD which is indicative of no infection   - CT Chest 6/29 reporting infectious vs inflammatory opacities   - CT A/P 6/26 reporting pleural effusion   - US Duples RLE with no DVT on 6/27  - Continue to monitor off antibiotics    MRI OF STUMP SITE SMALL COLLECTION MAY BE POST OPERATIVE IN NATURE ? HEMATOMA ? POST OPERATIVE CHAGES  SPOKE TO IR WHO  WILL ATTEMPT TO  ASPIRATE IT TODAY AND SEND FOR  GM STAIN AND CX   ALTHOUGH  DIALYSIS CATHETER APPEARS CLEAN IF NO OTHER SOURCE FOUND MAY NEED TO CONSIDER    D/C   WILL FOLLOWUP   SPOKE TO HOSPITALIST  SPOKE TO DAUGHTER ADRI ON THE PHONE

## 2022-07-01 NOTE — PROGRESS NOTE ADULT - ASSESSMENT
ESRD on HD  Fever- sepsis work negative so far except for CT of left leg stump with concerns for OM  MRI pending  Plan for HD tomorrow  Hb low- AMY with HD     ESRD on HD  Fever- sepsis work negative so far except for CT of left leg stump with concerns for OM  MRI pending  Plan for HD tomorrow  Hb at goal- continue AMY with HD

## 2022-07-02 NOTE — PROGRESS NOTE ADULT - ASSESSMENT
Elderly M with PMHX ESRD/HD on Mondays and Fridays ([place]), Hx GIB 2/2 AVM, CAD, HTN, HLD, PPM, CHFpEF, PAD s/p L Fem Bypass with cryovein c/b acute graft rethrombosis s/p thrombectomy now s/p L AKA discharged 2 days ago from Vascular Service after SICU admission for Hemorrhagic Shock s/p L AKA on Augmentin for "FUO". Patient brought back to [facility] ER for recurrent fevers despite abx with Augmentin and delirium. CXR from prior hospitalization with blunted costophrenic angles.   COVID/RVP Negative . Pt evaluated by Vascular Surgery for L AKA in ER. CT Surgery consulted for effusion possible parapneumonic effusion. ID consulted. Discussed plan of care with daughter and grandson at bedside. DNR/DNI confirmed but want all other medical and surgical interventions.     Recently discharged 6/16/22 per daughter, had low grade fever at that time.   at home progressively decline.  EMS was called, evaluated the patient, and brought to hospital.     fevers noted here.   CT scan showed:  1.  Bilateral pleural effusions (right greater than left).  2.  Complete passive atelectasis of the right lower lobe.  3.  Bilateral linear atelectasis.  4.  Groundglass opacities and septal thickening are of uncertain etiology.  5.  Bilateral pulmonary nodules, some which have a branching morphology   are uncertain etiology. A follow-up is recommended in 4-6 weeks to   evaluate for resolution/change    Fever.  weakness  pleural effusions  ESRD on HD    - Stump collection cultures pending    - RVP/ COVID  19 PCR negative on multiple occasions   - Procalcitonin level level is not elevated in the  setting of ESRD which is indicative of no infection   - CT Chest 6/29 reporting infectious vs inflammatory opacities usion   - US duplex LLE 7/1 with no DVT,   - MRI LLE 7/1 : collection ?  infectious  vs hematoma  - s/p IR drainage 7/1/22,    Pt is seen and examined on dialysis. No symptoms. Hemodynamics stable. Tolerating dialysis and ultrafiltration.  Pre Laboratory values personally reviewed by me.  Dialysis adjusted appropriately based on current values.  Will continue the current medical management.  Next hemodialysis as scheduled.  Discussed with nursing, primary care team.     D/W Daughter,

## 2022-07-02 NOTE — PROGRESS NOTE ADULT - ASSESSMENT
88 year old male with PMH of ESRD on HD (TTS), Hx GIB 2/2 AVM, CAD, HTN, HLD, PPM, CHFpEF, PAD s/p L Fem Bypass with cryovein c/b acute graft rethrombosis s/p thrombectomy now s/p L AKA discharged 2 days ago from Vascular Service after SICU admission for Hemorrhagic Shock s/p L AKA on Augmentin for "FUO" discharged two days then readmitted for Recurrent Fevers and AMS in the setting of a right pleural effusion.     #Fevers- continuing ; unclear etiology; rule out OM of left stump vs drug fever-Tmax 101 in the past 24 hours off antibiotics  -pleural fluid culture negative  -repeat blood cultures off antibiotics negative  -CT chest reporting ground glass opacities consistent with PNA; although patient continued to spike despite broad spectrum abx  -UA negative  -CT abd/pelvis negative for acute pathology  -RLE duplex negative for acute thrombosis; but reports mural calcifications vs chronic thrombosis (not a candidate for AC)  -Will check LLE duplex to rule out DVT as a cause of fevers per ID recs  -CT of left stump with concerns for OM; MRI ordered  -repeat CT chest with increasing GGO; possibly due to fluid which is being managed with UF and diuretics  -continue to observe off Abx per Dr. Bryan  -ID follow up appreciated; observe off Abx unless hemodynamically unstable and PermCath may need to be removed if Temp spikes to 102 or higher     #Acute Hypoxic Respiratory Failure due to Pleural Effusions  -s/p right chest tube which was removed on 6/23- removed about 1500cc  -repeat CT chest with stable effusion and ground glass opacities consistent with PNA  -repeat CT chest on 7/29 with increasing ground glass opacities but per radiologist are not consistent with PNA ?  -pleural fluid culture and cytology negative  -ultrafiltration and torsemide to help facilitate fluid removal  -MBS without concerns for aspiration   -CT surgery recs appreciated  -per ID continue to monitor off abx as above    #PAD s/p L Fem Bypass with cryovein c/b acute graft rethrombosis s/p thrombectomy now s/p L AKA  stump site clean without acute concerns for infection although CT with concerns for OM  MRI of left stump today  Continue aspirin and statin  Analgesia as needed  Vascular Surgery recs appreciated     #Hx LLE DVT-Eliquis   Held 2/2 to hemorrhagic shock  RLE duplex negative for acute DVT  left stump duplex - fluid post drain  continue DVT ppx    #ESRD on HD (TTS)  HD tentatively scheduled for 7/2; UF goal per renal  Midodrine to assist with UF  Continue torsemide  Continue Nephrovite daily  Continue Sevelamer TID with meals   Renal recs appreciated    #Chronic Diastolic Heart Failure  Appears euvolemic; UF goal per renal  Continue Torsemide  Continue Imdur  Fluid restriction  strict I/os, daily weights    #Intermittent Delirium likely in the setting of fevers and hospital confinement   Family reports patient has been increasingly getting more forgetful in the past few months likely due to cognitive impairment at baseline  Continue Melatonin 3mg, Trazodone 50 mg and Seroquel 25mg    # CAD  -8 beats of NSVT on 7/30 -no further events overnight or today  -discussed with Dr. Jaime on 7/30 who recommended to continue telemonitoring and defer beta-blockers at this time  -continue aspirin and statin    #HLD  -continue statin    #HTN  -BP stable on midodrine; wean as tolerated  -continue torsemide and Imdur  -low sodium diet    #Constipation- had large BM with blood today     #BPH  -continue Flomax    #Anemia of chronic kidney disease  -Hb stable  -iron studies reviewed  -continue procrit  -monitor CBC closely    #Chronic Pain Syndrome  -continue fentanyl and lidocaine patches    DVT ppx - Heparin SC

## 2022-07-02 NOTE — CONSULT NOTE ADULT - CONSULT REQUESTED BY NAME
Dr. Davenport [facility]
ED MD
4T
Dr. Davenport
Dr Davenport
Dr. Galindo
Dr. Elise

## 2022-07-02 NOTE — CONSULT NOTE ADULT - PROVIDER SPECIALTY LIST ADULT
Vascular Surgery
Cardiology
Infectious Disease
Nephrology
Palliative Care
Thoracic Surgery
Gastroenterology

## 2022-07-02 NOTE — PROGRESS NOTE ADULT - ASSESSMENT
8 y.o. M with PMHX ESRD/HD on Mondays and Fridays ([place]), Hx GIB 2/2 AVM, CAD, HTN, HLD, PPM, CHFpEF, PAD s/p L Fem Bypass with cryovein c/b acute graft rethrombosis s/p thrombectomy now s/p L AKA discharged 2 days ago from Vascular Service after SICU admission for Hemorrhagic Shock s/p L AKA on Augmentin for "FUO". Patient brought back to [facility] ER for recurrent fevers despite abx with Augmentin and delirium. CXR from prior hospitalization with blunted costophrenic angles.   COVID/RVP Negative . Pt evaluated by Vascular Surgery for L AKA in ER. CT Surgery consulted for effusion possible parapneumonic effusion. ID consulted. Discussed plan of care with daughter and grandson at bedside. DNR/DNI confirmed but want all other medical and surgical interventions.     recently discharged 6/16/22 per daughter, had low grade fever at that time.   at home progressively decline.  EMS was called, evaluated the patient, and brought to hospital.     fevers noted here.   CT scan showed:  1.  Bilateral pleural effusions (right greater than left).  2.  Complete passive atelectasis of the right lower lobe.  3.  Bilateral linear atelectasis.  4.  Groundglass opacities and septal thickening are of uncertain etiology.  5.  Bilateral pulmonary nodules, some which have a branching morphology   are uncertain etiology. A follow-up is recommended in 4-6 weeks to   evaluate for resolution/change        Fevers   fevers  weakness  pleural effusions  ESRD on HD    - Stump collection cultures pending  - Blood cultures 6/19, 6/23, 6/25, 6/28 all no growth   - Pleural fluid culture 6/19 no growth   - RVP/COVID 19 PCR negative on multiple occasions   - Procalcitonin level level is not elevated in setting of ESRD which is indicative of no infection   - CT Chest 6/29 reporting infectious vs inflammatory opacities   - CT A/P 6/26 reporting pleural effusion   - US Duples RLE with no DVT on 6/27  - Continue to monitor off antibiotics  - US duplex LLE 7/1 with no DVT and US Duplex RLE 6/27 with no DVT  - MRI LLE 7/1 reporting collection? infectious vs hematoma  - s/p IR drainage 7/1  - need to r/o drug fever id stump cultures are negative   - Check ESR, CRP, SPARKLE  - May need rheumatology work up  - Trend Fever  - Trend WBC      Will Follow

## 2022-07-02 NOTE — CONSULT NOTE ADULT - CONSULT REQUESTED DATE/TIME
18-Jun-2022 19:06
19-Jun-2022 12:22
19-Jun-2022 13:48
18-Jun-2022 23:07
19-Jun-2022 14:05
02-Jul-2022 13:07
20-Jun-2022 13:38

## 2022-07-02 NOTE — PROGRESS NOTE ADULT - SUBJECTIVE AND OBJECTIVE BOX
Patient was seen and evaluated on dialysis.   No c/o CP SOB NV  no F/C  no swelling    T(C): 36.6 (07-02-22 @ 15:35), Max: 38.5 (07-01-22 @ 22:53)  HR: 68 (07-02-22 @ 15:35) (54 - 68)  BP: 113/58 (07-02-22 @ 15:35) (113/58 - 164/66)    Wt(kg): -- NA    PE ;  NAD, Pale, Elderly, Debilitated, Frail, Confused,   lungs - CTA  CV gr 1 murmur,  No gallop or rub  Abd : soft, NT BS +, No masses  Ext- No edema  Neuro : Grossly intact, moving extremities , Amputee,                        9.5    9.53  )-----------( 450      ( 02 Jul 2022 13:20 )             30.8      07-02    141  |  98  |  72.6<H>  ----------------------------<  130<H>  4.5   |  24.0  |  4.32<H>    Ca    10.2      02 Jul 2022 05:40  Phos  4.2     07-02  Mg     2.4     07-02    MEDICATIONS  (STANDING):  acetaminophen     Tablet .. PRN  aspirin  chewable  atorvastatin  chlorhexidine 2% Cloths  epoetin juan-epbx (RETACRIT) Injectable  fentaNYL   Patch  12 MICROgram(s)/Hr  heparin   Injectable  ibuprofen  Suspension.  isosorbide   mononitrate ER Tablet (IMDUR)  lidocaine   4% Patch  Nephro-pito  ondansetron Injectable PRN  pantoprazole    Tablet  QUEtiapine  sevelamer carbonate  tamsulosin  traZODone    < from: MR Lower Ext Non-joint No Cont, Left (07.01.22 @ 12:04) >    ACC: [id number] EXAM:  MR LWR EXT NON JOINT LT                          PROCEDURE DATE:  07/01/2022          INTERPRETATION:  Clinical indication: Status post recent left about the   amputation with fevers of unknown origin.    Multiplanar multisequence noncontrast MRI of the left lower extremity   from the level of the subtrochanteric femur to the level of the   amputation margin.    Correlation is made with prior CT from June 30, 2022.    FINDINGS:    Patient is again noted to be status post above-the-knee amputation. There   is a rounded collection of intermediate to hyperintense T1 and   hyperintense T2 signal abutting the medullary cavity of the distal   indication margin and involving the distal medullary cavity. Measuring   approximately 1.3 x 1.6 x 1.9 cm. Additionally, there is nonspecific   periosseous edema about the distal portion of the amputation which is   near circumferential and suggestive of nonspecific periostitis. This   spans a craniocaudal dimension of approximately 7.1 cm. These findings   are nonspecific and may be related to postoperative change with small   hematoma, however superimposed infection is also in the differential.   There is intramuscular edema within the anterior compartment musculature   which is likely postoperative.    IMPRESSION:    Status post left above-the-knee amputation. Rounded collection of   intermediate to hyperintense T1 and hyperintense T2 signal abutting the   medullary cavity at the distal amputation margin and involving the distal   medullary cavity. Additionally, there is nonspecific periostitis about   the distal aspect of the femoral remnant. These findings are nonspecific   and may be related to postoperative change with small hematoma/seroma,   however superimposed infection is also in the differential.    --- End of Report ---            JAVIER ABBOTT MD; Attending Radiologist  This document has been electronically signed. Jul 1 2022 12:44PM    < end of copied text >    Patient stable  Josh HD easily  Continue

## 2022-07-02 NOTE — CONSULT NOTE ADULT - PROBLEM SELECTOR RECOMMENDATION 9
f/u AM cxr  possible pigtail in AM  f/u AM labs   monitor for SIRS/sepsis  will continue to follow
Most likely from hemorrhoidal bleed vs diverticular bleed. As per daughter 1 episode of reported dark stool last night and one this moderate amount this morning. Patient known to GI service and is seeing by Gastroenterologist Dr. Evangelist Giron. Patient with a history ofr GIBs with AVMs in the past.   Last EGD 11/23/21, 2/2 anemia revealed Erosions and ulceration in the pre-pyloric region compatible with erosive gastritis. Otherwise normal.   5/9/22 Sigmoidoscopy with Dr. Giron 2/2 LGIB revealed proctitis.   Today, hemoglobin stable at 10.1gm. ANDRE revealed no bloody in rectal vault.  No evidence of overt GI bleeding at this time.     Plan:  Will continue to trend CBC, transfuse as needed  No contraindications for Subq heparin at this time  Diet as tolerated   Continue Pantoprazole daily  No endoscopic interventions needed. If pt develops any GI bleeding and drop in hgb, may need to undergo a repeat flex sig.

## 2022-07-02 NOTE — CONSULT NOTE ADULT - NS ATTEND AMEND GEN_ALL_CORE FT
88M with significant medical hx including recent s/p Left AKA. Daughter works as RN here.   Discussed in detail. No symptoms of constipation. Two episodes of rectal bleeding today. Hb stable.   ANDRE showed no evidence of ongoing bleeding or external hemorroides/fissure.   Known to GI service from before, last flexible sigmoidoscopy showed proctitis.   Agree with above recs.   GI will follow

## 2022-07-02 NOTE — CONSULT NOTE ADULT - CONSULT REASON
AMS
PNA
PPM eval
ESRD HD
pleural effusion
Sandy
" 87 yo male with multiple chronic conditions worsening"

## 2022-07-02 NOTE — CONSULT NOTE ADULT - ASSESSMENT
1) ESRD on HD  2) MBD of renal dx  3) Anemia of renal dx  4) Vol HTN    HD planned for AM  Consent in chart  Orders in place  
89 yo M PMH ESRD on HD, anemia, TAVR, CHF with preserved EF, HTN, CAD, AFIB, PVD s/p recent bypass, LLE DVT, recently admitted with gastrointestinal bleeding, here with hemorrhagic shock related to graft bleeding s/p ligation of bleeding CryoVein fem-AT bypass, with AV graft thrombosis. Pt with recent admission to general surgery and LLE AKA. Patient has had pleural effusion in past sometime 2 years ago per family. Pt underwent CT chest which shows moderate right sided pleural effusion. Pt is resting in bed breathing comfortably but somewhat confused. Will discuss need for drainage of pleural effusion in AM. 
Patient is a 88 year old male with PMH of ESRD on HD (TTS), Hx GIB 2/2 AVM, CAD, HTN, HLD, PPM, CHFpEF, PAD s/p L Fem Bypass with cryovein c/b acute graft rethrombosis s/p thrombectomy now s/p L AKA discharged 2 days ago from Vascular Service after SICU admission for Hemorrhagic Shock s/p L AKA on Augmentin for "FUO" discharged two days then readmitted for Recurrent Fevers and AMS in the setting of a right pleural effusion. GI consulted for melena.   
This 88 y.o. M with PMHX ESRD/HD on Mondays and Fridays ([place]), Hx GIB 2/2 AVM, CAD, HTN, HLD, PPM, CHFpEF, PAD s/p L Fem Bypass with cryovein c/b acute graft rethrombosis s/p thrombectomy now s/p L AKA discharged 2 days ago from Vascular Service after SICU admission for Hemorrhagic Shock s/p L AKA on Augmentin for "FUO". Patient brought back to [facility] ER for recurrent fevers despite abx with Augmentin and delirium. CXR from prior hospitalization with blunted costophrenic angles.   COVID/RVP Negative . Pt evaluated by Vascular Surgery for L AKA in ER. CT Surgery consulted for effusion possible parapneumonic effusion. ID consulted. Discussed plan of care with daughter and grandson at bedside. DNR/DNI confirmed but want all other medical and surgical interventions.  (18 Jun 2022 23:40)    daughter at bedside.   recently discharged 6/16/22 per daughter, had low grade fever at that time.   at home progressively decline.  EMS was called, evaluated the patient, and brought to hospital.     fevers noted here.   CT scan showed:  1.  Bilateral pleural effusions (right greater than left).  2.  Complete passive atelectasis of the right lower lobe.  3.  Bilateral linear atelectasis.  4.  Groundglass opacities and septal thickening are of uncertain etiology.  5.  Bilateral pulmonary nodules, some which have a branching morphology   are uncertain etiology. A follow-up is recommended in 4-6 weeks to   evaluate for resolution/change    patient started on empiric antibiotics with zosyn.     IMpression:  fevers  weakness  pleural effusions  ESRD on HD      Plan:  workup in progress.    - follow up all outstanding cultures  - trend temperature and WBC curve  - repeat cultures from blood and all sources if febrile.    - continue zosyn 3.375 grams Q 12H    possible drainage of pleural effusion  - please send for culture.       ESRD on HD  - renal following  
88M with  ESRD on HD, anemia, CHF with preserved EF, HTN, CAD, AFIB, PVD s/p recent bypass, LLE DVT, recently admitted with gastrointestinal bleeding, hemorrhagic shock related to graft bleeding s/p ligation of bleeding CryoVein fem-AT bypass, s/p left AKA, now here with recurrent fevers.     #1 Fevers  - infectious disease following  - COVID/RVP negative   - CT A/P - left pleural effusion s/p chest tube  - cultures pending  - on empiric zosyn     #2 PLeural effusion  - s/p chest tube, CT surgery following  - culture pending    #3 ESRD  - unable to tolerate HD today due to hypotension  - nephrology following    #4 S/P L AKA, LE pain   - PAD, failed femoral tibial bypass  - graft thrombosis  - L stump with ace bandage dry and intact  - vascular following   - continue fentanyl patch     #5 LLE DVT, afib  - no AC due to multiple bleeding episodes     #6 Encounter for palliative care  - patient has been rehospitalized numerous times and carries very poor prognosis but family still feels he has meaningful interactions and have not been ready for comfort measures  - continue to follow course 
ASSESSMENT: Patient is a 88y old male with AMS.    PLAN:    - No vascular intervention required  - Will f/u blood cultures obtained in ER 6/18  - Vascular will follow if patient is admitted  - Recommend medical evaluation for AMS  - Plan discussed with Attending, Dr. Mason

## 2022-07-02 NOTE — PROGRESS NOTE ADULT - SUBJECTIVE AND OBJECTIVE BOX
Roberta notified that rx was [facility].  Yessi Nails LPN.........................9/5/2018  3:19 PM     [facility]  INFECTIOUS DISEASES AND INTERNAL MEDICINE at [place] and [place]  =======================================================                               Jeet Bryan MD#  Mango Oliver MD*                                     Andrew Kaba MD*    Radha Short MD*            Diplomates American Board of Internal Medicine & Infectious Diseases                # [place] Office - Appt - Tel  [phone] Fax [phone]                * [place] Office - Appt - Tel [phone] Fax [phone]                                  Hospital Consult line:  [phone]  =======================================================    CHRISTIANO ELIZABETH [id number]    Follow up: Fever    Last fever to 101.3F on 7/1  s/p IR drainage of stump collection  7/1      Allergies:  Plavix (Hives)  Toprol-XL (Rash)       REVIEW OF SYSTEMS:  Unable to obtain. Patient is confused       Physical Exam:  GEN: NAD  HEENT: normocephalic and atraumatic. EOMI. PERRL.    NECK: Supple.   LUNGS: CTA B/L.  HEART: RRR  ABDOMEN: Soft, NT, ND.  +BS.    : No CVA tenderness  EXTREMITIES: Without  edema.  MSK: No joint swelling  NEUROLOGIC: awake, randomly moving   PSYCHIATRIC: confused   SKIN: Left AKA site intact       Vitals:  T(F): 98.4 (02 Jul 2022 06:05), Max: 101.3 (01 Jul 2022 22:53)  HR: 61 (02 Jul 2022 06:05)  BP: 160/60 (02 Jul 2022 06:05)  RR: 18 (02 Jul 2022 06:05)  SpO2: 96% (02 Jul 2022 06:05) (95% - 96%)  temp max in last 48H T(F): , Max: 101.3 (07-01-22 @ 22:53)      Current Antibiotics:    Other medications:  aspirin  chewable 81 milliGRAM(s) Oral daily  atorvastatin 80 milliGRAM(s) Oral at bedtime  chlorhexidine 2% Cloths 1 Application(s) Topical daily  epoetin juan-epbx (RETACRIT) Injectable 63617 Unit(s) IV Push <User Schedule>  fentaNYL   Patch  12 MICROgram(s)/Hr 1 Patch Transdermal every 72 hours  heparin   Injectable 5000 Unit(s) SubCutaneous every 12 hours  ibuprofen  Suspension. 400 milliGRAM(s) Oral <User Schedule>  isosorbide   mononitrate ER Tablet (IMDUR) 30 milliGRAM(s) Oral daily  lidocaine   4% Patch 1 Patch Transdermal every 24 hours  Nephro-pito 1 Tablet(s) Oral at bedtime  pantoprazole    Tablet 40 milliGRAM(s) Oral before breakfast  QUEtiapine 25 milliGRAM(s) Oral at bedtime  sevelamer carbonate 800 milliGRAM(s) Oral every 8 hours  tamsulosin 0.4 milliGRAM(s) Oral at bedtime  traZODone 50 milliGRAM(s) Oral at bedtime                            10.1   8.90  )-----------( 411      ( 02 Jul 2022 05:40 )             34.0     07-02    141  |  98  |  72.6<H>  ----------------------------<  130<H>  4.5   |  24.0  |  4.32<H>    Ca    10.2      02 Jul 2022 05:40  Phos  4.2     07-02  Mg     2.4     07-02      RECENT CULTURES:  06-28 @ 21:27 .Blood Blood-Peripheral     No growth to date.    06-28 @ 21:25 .Blood Blood-Peripheral     No growth to date.    06-25 @ 16:34 .Blood Blood-Peripheral     No Growth Final    06-25 @ 16:28    [facility]    06-23 @ 11:16 .Blood Blood-Peripheral     No Growth Final    06-19 @ 18:21 .Body Fluid Pleural Fluid     No growth at 5 days  polymorphonuclear leukocytes seen  No organisms seen  by cytocentrifuge    06-19 @ 03:11 .Blood Blood-Peripheral     No Growth Final    06-19 @ 03:08 .Blood Blood-Peripheral     No Growth Final    06-18 @ 17:24    [facility]      WBC Count: 8.90 K/uL (07-02-22 @ 05:40)  WBC Count: 8.04 K/uL (07-01-22 @ 09:17)  WBC Count: 9.98 K/uL (06-30-22 @ 06:57)  WBC Count: 7.56 K/uL (06-29-22 @ 07:21)  WBC Count: 8.41 K/uL (06-28-22 @ 07:15)  WBC Count: 7.17 K/uL (06-27-22 @ 11:13)    Creatinine, Serum: 4.32 mg/dL (07-02-22 @ 05:40)  Creatinine, Serum: 3.42 mg/dL (07-01-22 @ 09:17)  Creatinine, Serum: 4.41 mg/dL (06-30-22 @ 06:57)  Creatinine, Serum: 3.37 mg/dL (06-29-22 @ 07:21)  Creatinine, Serum: 5.51 mg/dL (06-28-22 @ 07:15)    Procalcitonin, Serum: 1.41 ng/mL (07-01-22 @ 09:17)  Procalcitonin, Serum: 1.36 ng/mL (07-01-22 @ 07:21)  Procalcitonin, Serum: 0.79 ng/mL (06-26-22 @ 06:56)  Procalcitonin, Serum: 0.79 ng/mL (06-26-22 @ 06:56)  Procalcitonin, Serum: 1.37 ng/mL (06-19-22 @ 07:37)     COVID-19 PCR: NotDetec (06-30-22 @ 17:23)  SARS-CoV-2: NotDetec (06-25-22 @ 16:28)  COVID-19 PCR: NotDetec (06-25-22 @ 06:24)  SARS-CoV-2: NotDetec (06-18-22 @ 17:24)  COVID-19 PCR: NotDetec (06-13-22 @ 06:58)  COVID-19 PCR: NotDetec (06-03-22 @ 18:58)          < from: MR Lower Ext Non-joint No Cont, Left (07.01.22 @ 12:04) >  IMPRESSION:    Status post left above-the-knee amputation. Rounded collection of   intermediate to hyperintense T1 and hyperintense T2 signal abutting the   medullary cavity at the distal amputation margin and involving the distal   medullary cavity. Additionally, there is nonspecific periostitis about   the distal aspect of the femoral remnant. These findings are nonspecific   and may be related to postoperative change with small hematoma/seroma,   however superimposed infection is also in the differential.    --- End of Report ---    < end of copied text >        < from: CT Chest No Cont (06.29.22 @ 18:26) >    IMPRESSION:  Scattered ill-defined groundglass and nodular opacities are increased   from the prior study. These are indeterminate, possibly   infectious/inflammatory. Short-term follow-up CT needed for complete   evaluation.    --- End of Report ---    < end of copied text >        < from: CT Abdomen and Pelvis No Cont (06.26.22 @ 09:27) >    IMPRESSION:  1.  No evidence of intra-abdominal abscess.  2.  Stable RIGHT pleural effusion. Redemonstrated groundglass opacities   consistent with pneumonia.  3.  As seen on prior CT, multiple small nodules present; better seen on   prior CT. Prior CT recommended follow-up in 3 months to ensure resolution.  4.  Hepatic cirrhosis.  5.  Cholecystolithiasis without acute cholecystitis.    --- End of Report ---    < end of copied text >

## 2022-07-02 NOTE — CONSULT NOTE ADULT - SUBJECTIVE AND OBJECTIVE BOX
HPI: 88M with PMH as listed well known to our service, readmitted within 48 hours of discharge with fevers. Patient on recent admission here had left above knee amputation, and was having fevers prior to discharge, likely related to recurrent microaspiration and was sent home on Augementin. CT chest reveled left sided pleural effusion s/p chest tube 6/19. Patient with hypotension and unable to complete HD today. Palliative consulted to help with goals of care.     PERTINENT PMH REVIEWED: Yes No    PAST MEDICAL & SURGICAL HISTORY:  DM (diabetes mellitus)- resolved  AV block, 1st degree  Arrhythmia  CAD (coronary artery disease)  HTN (hypertension)  VT (ventricular tachycardia)  RICHARDS (dyspnea on exertion)  Anemia  Risk factors for obstructive sleep apnea  ESRD on dialysis HD on Mondays and Fridays  Aortic stenosis - s/p valve replacement  GI bleeding due to ulcerated polyps and colonic AVMs  H/O circumcision at  age  65  H/O left knee surgery  H/O angioplasty 2013,  no  intervention  A-V fistula left arm 5/2017  H/O carotid endarterectomy Right  S/P TAVR (transcatheter aortic valve replacement)  History of loop recorder    SOCIAL HISTORY:                      Substance history: non smoker                    Admitted from:  home                      Rastafarian/spirituality:                    Cultural concerns: none                       Surrogate - wife Shameka , daughter Vanessa     FAMILY HISTORY:  Family history of cancer (Grandparent)  Family history of lung cancer (Grandparent)  Family history of premature CAD  FH: type 2 diabetes    Allergies    Plavix (Hives)  Toprol-XL (Rash)    ADVANCE DIRECTIVES/TREATMENT PREFERENCES:  DNR/DNI - MOLST, continue all other medical treatments    Baseline ADLs (prior to admission):  Dependent      Karnofsky/Palliative Performance Status Version 2:  %  http://npcrc.org/files/news/[email or web address]_performance_scale_[email or web address].pdf    Present Symptoms:     Dyspnea: none   Nausea/Vomiting: No  Anxiety:  No  Depression: No  Fatigue: Yes   Loss of appetite: Yes No  Constipation: none     Pain: none             Character-            Duration-            Effect-            Factors-            Frequency-            Location-            Severity-    Pain AD Score:  [email or web address] (press ctrl + left click to view)    Review of Systems: Reviewed                 Unable to obtain due to poor mentation   All others negative    MEDICATIONS  (STANDING):  aspirin  chewable 81 milliGRAM(s) Oral daily  atorvastatin 80 milliGRAM(s) Oral at bedtime  chlorhexidine 2% Cloths 1 Application(s) Topical daily  fentaNYL   Patch  12 MICROgram(s)/Hr 1 Patch Transdermal every 72 hours  heparin   Injectable 5000 Unit(s) SubCutaneous every 12 hours  isosorbide   mononitrate ER Tablet (IMDUR) 30 milliGRAM(s) Oral daily  lidocaine 1% (Preservative-free) Injectable 20 milliLiter(s) Local Injection once  melatonin 3 milliGRAM(s) Oral at bedtime  midodrine. 10 milliGRAM(s) Oral three times a day  multivitamin 1 Tablet(s) Oral daily  Nephro-pito 1 Tablet(s) Oral at bedtime  pantoprazole    Tablet 40 milliGRAM(s) Oral before breakfast  piperacillin/tazobactam IVPB.. 3.375 Gram(s) IV Intermittent every 12 hours  QUEtiapine 25 milliGRAM(s) Oral at bedtime  sevelamer carbonate 800 milliGRAM(s) Oral every 8 hours  tamsulosin 0.4 milliGRAM(s) Oral at bedtime  traZODone 50 milliGRAM(s) Oral at bedtime    MEDICATIONS  (PRN):  acetaminophen     Tablet .. 650 milliGRAM(s) Oral every 6 hours PRN Temp greater or equal to 38C (100.4F), Mild Pain (1 - 3)  melatonin 3 milliGRAM(s) Oral at bedtime PRN Insomnia  ondansetron Injectable 4 milliGRAM(s) IV Push every 8 hours PRN Nausea and/or Vomiting      PHYSICAL EXAM:    Vital Signs Last 24 Hrs  T(C): 36.9 (20 Jun 2022 08:45), Max: 38.1 (19 Jun 2022 17:00)  T(F): 98.5 (20 Jun 2022 08:45), Max: 100.6 (19 Jun 2022 17:00)  HR: 62 (20 Jun 2022 12:29) (54 - 65)  BP: 112/45 (20 Jun 2022 12:29) (84/42 - 118/50)  BP(mean): --  RR: 18 (20 Jun 2022 09:31) (18 - 18)  SpO2: 100% (20 Jun 2022 09:31) (100% - 100%)    General: alert, sitting in bedside chair comfortable     HEENT: normal     Lungs: comfortable    CV: normal      GI: normal      : oliguria/anuria    MSK: bedbound/wheelchair bound, s/p left AKA, stump dressing clean and intact     Neuro: no focal deficits    Skin: no rash    LABS:                      9.6    5.47  )-----------( 313      ( 20 Jun 2022 07:07 )             32.5     06-20    138  |  95<L>  |  36.6<H>  ----------------------------<  110<H>  4.4   |  27.0  |  4.55<H>    Ca    8.6      20 Jun 2022 07:07  Phos  5.6     06-19  Mg     2.0     06-19    TPro  6.1<L>  /  Alb  2.8<L>  /  TBili  0.5  /  DBili  x   /  AST  20  /  ALT  13  /  AlkPhos  243<H>  06-20    PT/INR - ( 18 Jun 2022 17:17 )   PT: 16.2 sec;   INR: 1.39 ratio       PTT - ( 18 Jun 2022 17:17 )  PTT:37.2 sec    I&O's Summary    19 Jun 2022 07:01  -  20 Jun 2022 07:00  --------------------------------------------------------  IN: 100 mL / OUT: 890 mL / NET: -790 mL    RADIOLOGY & ADDITIONAL STUDIES:    < from: CT Chest No Cont (06.18.22 @ 20:58) >      IMPRESSION:    1.  Bilateral pleural effusions (right greater than left).  2.  Complete passive atelectasis of the right lower lobe.  3.  Bilateral linear atelectasis.  4.  Groundglass opacities and septal thickening are of uncertain etiology.  5.  Bilateral pulmonary nodules, some which have a branching morphology   are uncertain etiology. A follow-up is recommended in 4-6 weeks to   evaluate for resolution/change    --- End of Report ---    < end of copied text >    < from: Xray Chest 1 View AP/PA. (06.03.22 @ 15:55) >    IMPRESSION: Increasing bilateral airspace disease as above    --- End of Report ---    < end of copied text >    < from: CT Chest No Cont (06.18.22 @ 20:58) >    IMPRESSION:    1.  Bilateral pleural effusions (right greater than left).  2.  Complete passive atelectasis of the right lower lobe.  3.  Bilateral linear atelectasis.  4.  Groundglass opacities and septal thickening are of uncertain etiology.  5.  Bilateral pulmonary nodules, some which have a branching morphology   are uncertain etiology. A follow-up is recommended in 4-6 weeks to   evaluate for resolution/change    --- End of Report ---    < end of copied text >    
VASCULAR SURGERY CONSULT     HPI: 88y Male well known to vascular surgery service with recent prolonged hospitalization that ended with NANCY TERRAZAS who presented to the ER with daughter for AMS and fevers at home. Per daughter, patient has been having temperatures up to 100.6 and chills at home since discharge, hasn't slept for 48 hours, and has been altered for the past day. Patient's Trazodone was changed to Seroquel. Patient was worked up for his fevers before discharge last week and blood cultures and fever workup was negative. No other complaints. Has been tolerating diet and having regular bowel movements. Denies nausea, vomiting, chest pain, and sob.     ROS: 10-system review is otherwise negative except HPI above.      PAST MEDICAL & SURGICAL HISTORY:  DM (diabetes mellitus)  - resolved  AV block, 1st degree  Arrhythmia  CAD (coronary artery disease)  HTN (hypertension)  VT (ventricular tachycardia)  RICHARDS (dyspnea on exertion)  Anemia  Risk factors for obstructive sleep apnea  ESRD on dialysis  HD on Mondays and Fridays  Aortic stenosis  - s/p valve replacement  GI bleeding  due to ulcerated polyps and colonic AVMs  H/O circumcision  at  age  65  H/O left knee surgery  H/O angioplasty  2013,  no  intervention  A-V fistula  left arm 5/2017  H/O carotid endarterectomy  Right  S/P TAVR (transcatheter aortic valve replacement)  History of loop recorder      FAMILY HISTORY:  Family history of cancer (Grandparent)  Family history of lung cancer (Grandparent)  Family history of premature CAD  FH: type 2 diabetes    SOCIAL HISTORY:  Denies any toxic habits    ALLERGIES: NKA Plavix (Hives)  Toprol-XL (Rash)    HOME MEDICATIONS:   acetaminophen 325 mg oral tablet: 3 tab(s) orally every 6 hours, As needed, Mild Pain (1 - 3) (31 May 2022 13:40)  atorvastatin 80 mg oral tablet: 1 tab(s) orally once a day (at bedtime) (31 May 2022 13:40)  Melatonin 3 mg oral tablet: 1 tab(s) orally once a day (at bedtime) (31 May 2022 13:40)  Nephro-Thomas oral tablet: 1 tab(s) orally once a day (31 May 2022 13:40)  NIFEdipine 90 mg oral tablet, extended release: 1 tab(s) orally once a day (31 May 2022 13:40)  sevelamer hydrochloride 800 mg oral tablet: 1 tab(s) orally 3 times a day (31 May 2022 13:40)  Super B Complex: 1 tab(s) orally once a day (31 May 2022 13:40)  torsemide 20 mg oral tablet: 1 tab(s) orally on non HD days (31 May 2022 13:40)  traZODone 50 mg oral tablet: 1 tab(s) orally once a day (at bedtime) (31 May 2022 13:40)  --------------------------------------------------------------------------------------------    PHYSICAL EXAM:   General: NAD, Lying in bed comfortably  Neuro: A+Ox3  HEENT: EOMI, PERRLA, MMM, R IJ tunneled catheter w/o surrounding erythema nor evidence of infection.   Cardio: RRR  Resp: Non labored breathing on NC  GI/Abd: Soft, NT/ND, no rebound/guarding, no masses palpated  Vascular: All 4 extremities warm and well perfused.   Pelvis: stable  Musculoskeletal: LLE AKA wound has intact staples and no surrounding erythema.   --------------------------------------------------------------------------------------------    LABS                 11.0   5.32   )----------(  349       ( 18 Jun 2022 17:17 )               35.9      135    |  92     |  22.8   ----------------------------<  136        ( 18 Jun 2022 17:17 )  4.1     |  28.0   |  2.70     Ca    10.0       ( 18 Jun 2022 17:17 )    TPro  7.1    /  Alb  3.3    /  TBili  0.6    /  DBili  x      /  AST  30     /  ALT  16     /  AlkPhos  325    ( 18 Jun 2022 17:17 )    LIVER FUNCTIONS - ( 18 Jun 2022 17:17 )  Alb: 3.3 g/dL / Pro: 7.1 g/dL / ALK PHOS: 325 U/L / ALT: 16 U/L / AST: 30 U/L / GGT: x           PT/INR -  16.2 sec / 1.39 ratio   ( 18 Jun 2022 17:17 )       PTT -  37.2 sec   ( 18 Jun 2022 17:17 )  CAPILLARY BLOOD GLUCOSE          ABG - ( 18 Jun 2022 16:52 )  pH: 7.460 /  pCO2: 47    /  pO2: 183   / HCO3: 33    / Base Excess: 9.6   /  SaO2: 99.8                --------------------------------------------------------------------------------------------  IMAGING      
                                           [facility]                                                INFECTIOUS DISEASES  =======================================================                               Jeet Bryan MD#  Mango Oliver MD*                                     Andrew Kaba MD*    aRdha Short MD*            Diplomates American Board of Internal Medicine & Infectious Diseases                  # [place] Office - Appt - Tel  [phone] Fax [phone]                * [place] Office - Appt - Tel [phone] Fax [phone]                                  Hospital Consult line:  [phone]  =======================================================    MRN-[id number]  CHRISTIANO ELIZABETH   HPI: This 88 y.o. M with PMHX ESRD/HD on Mondays and Fridays ([place]), Hx GIB 2/2 AVM, CAD, HTN, HLD, PPM, CHFpEF, PAD s/p L Fem Bypass with cryovein c/b acute graft rethrombosis s/p thrombectomy now s/p L AKA discharged 2 days ago from Vascular Service after SICU admission for Hemorrhagic Shock s/p L AKA on Augmentin for "FUO". Patient brought back to [facility] ER for recurrent fevers despite abx with Augmentin and delirium. CXR from prior hospitalization with blunted costophrenic angles.   COVID/RVP Negative . Pt evaluated by Vascular Surgery for L AKA in ER. CT Surgery consulted for effusion possible parapneumonic effusion. ID consulted. Discussed plan of care with daughter and grandson at bedside. DNR/DNI confirmed but want all other medical and surgical interventions.  (18 Jun 2022 23:40)    daughter at bedside.   recently discharged 6/16/22 per daughter, had low grade fever at that time.   at home progressively decline.  EMS was called, evaluated the patient, and brought to hospital.     fevers noted here.   CT scan showed:  1.  Bilateral pleural effusions (right greater than left).  2.  Complete passive atelectasis of the right lower lobe.  3.  Bilateral linear atelectasis.  4.  Groundglass opacities and septal thickening are of uncertain etiology.  5.  Bilateral pulmonary nodules, some which have a branching morphology   are uncertain etiology. A follow-up is recommended in 4-6 weeks to   evaluate for resolution/change    patient started on empiric antibiotics with zosyn.     I have personally reviewed the labs and data; pertinent labs and data are listed in this note; please see below.   =======================================================  Past Medical & Surgical Hx:  =====================  PAST MEDICAL & SURGICAL HISTORY:  DM (diabetes mellitus) - resolved  AV block, 1st degree  Arrhythmia  CAD (coronary artery disease)  HTN (hypertension)  VT (ventricular tachycardia)  RICHARDS (dyspnea on exertion)  Anemia  Risk factors for obstructive sleep apnea  ESRD on dialysis HD on Mondays and Fridays  Aortic stenosis - s/p valve replacement  GI bleeding due to ulcerated polyps and colonic AVMs  H/O circumcision at  age  65  H/O left knee surgery  H/O angioplasty 2013,  no  intervention  A-V fistula left arm 5/2017  H/O carotid endarterectomy Right  S/P TAVR (transcatheter aortic valve replacement)  History of loop recorder    Problem List:  ==========  HEALTH ISSUES - PROBLEM Dx:  Pleural effusion    Social Hx:  =======  no toxic habits currently    FAMILY HISTORY:  Family history of cancer (Grandparent)  Family history of lung cancer (Grandparent)  Family history of premature CAD  FH: type 2 diabetes    no significant family history of immunosuppressive disorders in mother or father   =======================================================    REVIEW OF SYSTEMS:  CONSTITUTIONAL:  + WEAKNESS  HEENT:  No diplopia or blurred vision.  No earache, sore throat or runny nose.  CARDIOVASCULAR:  No pressure, squeezing, strangling, tightness, heaviness or aching about the chest, neck, axilla or epigastrium.  RESPIRATORY:  No cough, shortness of breath  GASTROINTESTINAL:  No nausea, vomiting or diarrhea.  GENITOURINARY:  No dysuria, frequency or urgency. No Blood in urine  MUSCULOSKELETAL:  no joint aches, no muscle pain  SKIN:  No change in skin, hair or nails.  NEUROLOGIC:  No Headaches, seizures or weakness.  PSYCHIATRIC:  No disorder of thought or mood.  ENDOCRINE:  No heat or cold intolerance  HEMATOLOGICAL:  No easy bruising or bleeding.    =======================================================  Allergies  Plavix (Hives)  Toprol-XL (Rash)    Intolerances    Antibiotics:  piperacillin/tazobactam IVPB.. 3.375 Gram(s) IV Intermittent every 12 hours    Other medications:  aspirin  chewable 81 milliGRAM(s) Oral daily  atorvastatin 80 milliGRAM(s) Oral at bedtime  fentaNYL   Patch  12 MICROgram(s)/Hr 1 Patch Transdermal every 72 hours  heparin   Injectable 5000 Unit(s) SubCutaneous every 12 hours  isosorbide   mononitrate ER Tablet (IMDUR) 30 milliGRAM(s) Oral daily  lidocaine 1% (Preservative-free) Injectable 20 milliLiter(s) Local Injection once  melatonin 3 milliGRAM(s) Oral at bedtime  multivitamin 1 Tablet(s) Oral daily  Nephro-pito 1 Tablet(s) Oral at bedtime  pantoprazole    Tablet 40 milliGRAM(s) Oral before breakfast  QUEtiapine 25 milliGRAM(s) Oral at bedtime  sevelamer carbonate 800 milliGRAM(s) Oral every 8 hours  tamsulosin 0.4 milliGRAM(s) Oral at bedtime  torsemide 20 milliGRAM(s) Oral daily  traZODone 50 milliGRAM(s) Oral at bedtime     ceFAZolin   IVPB   100 mL/Hr IV Intermittent (04-20-22 @ 17:16)   100 mL/Hr IV Intermittent (04-21-22 @ 18:23)   100 mL/Hr IV Intermittent (04-22-22 @ 19:07)   100 mL/Hr IV Intermittent (04-23-22 @ 17:46)   100 mL/Hr IV Intermittent (04-24-22 @ 17:52)   100 mL/Hr IV Intermittent (04-25-22 @ 17:20)    meropenem  IVPB   100 mL/Hr IV Intermittent (06-18-22 @ 17:37)    piperacillin/tazobactam IVPB.   200 mL/Hr IV Intermittent (06-14-22 @ 02:55)    piperacillin/tazobactam IVPB.   200 mL/Hr IV Intermittent (06-03-22 @ 15:33)    piperacillin/tazobactam IVPB..   25 mL/Hr IV Intermittent (06-04-22 @ 02:19)   25 mL/Hr IV Intermittent (06-04-22 @ 14:02)   25 mL/Hr IV Intermittent (06-04-22 @ 22:39)    piperacillin/tazobactam IVPB..   25 mL/Hr IV Intermittent (06-14-22 @ 14:20)   25 mL/Hr IV Intermittent (06-15-22 @ 04:00)   25 mL/Hr IV Intermittent (06-15-22 @ 17:55)   25 mL/Hr IV Intermittent (06-16-22 @ 05:51)    piperacillin/tazobactam IVPB..   25 mL/Hr IV Intermittent (06-05-22 @ 10:13)   25 mL/Hr IV Intermittent (06-05-22 @ 23:15)   25 mL/Hr IV Intermittent (06-06-22 @ 11:41)   25 mL/Hr IV Intermittent (06-06-22 @ 23:22)   25 mL/Hr IV Intermittent (06-07-22 @ 12:02)   25 mL/Hr IV Intermittent (06-07-22 @ 22:29)   25 mL/Hr IV Intermittent (06-08-22 @ 11:20)   25 mL/Hr IV Intermittent (06-09-22 @ 01:37)    piperacillin/tazobactam IVPB..   25 mL/Hr IV Intermittent (06-19-22 @ 05:40)    vancomycin  IVPB   250 mL/Hr IV Intermittent (06-19-22 @ 04:01)      ======================================================  Physical Exam:  ============  T(F): 99.5 (19 Jun 2022 08:06), Max: 101 (18 Jun 2022 22:28)  HR: 65 (19 Jun 2022 08:06)  BP: 145/59 (19 Jun 2022 08:06)  RR: 18 (19 Jun 2022 08:06)  SpO2: 98% (19 Jun 2022 08:06) (95% - 98%)  temp max in last 48H T(F): , Max: 101 (06-18-22 @ 22:28)Height (cm): 165.1 (06-18-22 @ 15:53)  Weight (kg): 68 (06-18-22 @ 15:53)  BMI (kg/m2): 24.9 (06-18-22 @ 15:53)  BSA (m2): 1.75 (06-18-22 @ 15:53)    General:  No acute distress. frail  Eye: Pupils are equal, round and reactive to light, Extraocular movements are intact, Normal conjunctiva.  HENT: Normocephalic, Oral mucosa is dry, EDENTULOUS  Neck: Supple, No lymphadenopathy.  Respiratory: Lungs with fair air entry anteriorly  Cardiovascular: Normal rate, Regular rhythm,   RIGHT sided HD catheter.   Gastrointestinal: Soft, Non-tender, Non-distended, Normal bowel sounds.  Genitourinary: No costovertebral angle tenderness.  Lymphatics: No lymphadenopathy neck,   Musculoskeletal:  LEFT BKA  Integumentary: No rash.  Neurologic: Alert, Oriented, No focal deficits, Cranial Nerves II-XII are grossly intact.  Psychiatric: Appropriate mood & affect.    =======================================================  Labs:                        9.8    5.50  )-----------( 297      ( 19 Jun 2022 07:37 )             32.6     06-19    140  |  97<L>  |  28.1<H>  ----------------------------<  88  3.8   |  27.0  |  3.44<H>    Ca    9.2      19 Jun 2022 07:37  Phos  5.6     06-19  Mg     2.0     06-19    TPro  6.2<L>  /  Alb  2.8<L>  /  TBili  0.5  /  DBili  x   /  AST  25  /  ALT  16  /  AlkPhos  273<H>  06-19      Culture - Blood (collected 06-14-22 @ 05:44)  Source: .Blood Blood  Final Report (06-19-22 @ 06:00):    No Growth Final    Culture - Blood (collected 06-14-22 @ 05:44)  Source: .Blood Blood  Final Report (06-19-22 @ 06:00):    No Growth Final    Creatinine, Serum: 3.44 mg/dL (06-19-22 @ 07:37)  Creatinine, Serum: 2.70 mg/dL (06-18-22 @ 17:17)  Creatinine, Serum: 3.76 mg/dL (06-15-22 @ 05:35)       Procalcitonin, Serum: 1.37 ng/mL (06-19-22 @ 07:37)    SARS-CoV-2: NotDetec (06-18-22 @ 17:24)  COVID-19 PCR: NotDetec (06-13-22 @ 06:58)  COVID-19 PCR: NotDetec (06-03-22 @ 18:58)  COVID-19 PCR: NotDetec (05-31-22 @ 04:59)         < from: CT Chest No Cont (06.18.22 @ 20:58) >    ACC: [id number] EXAM:  CT CHEST                          PROCEDURE DATE:  06/18/2022          INTERPRETATION:  CT CHEST WITH CONTRAST    INDICATION: And shortness of breath. Weakness.    TECHNIQUE: Unenhanced helical images were obtained of the chest. Coronal   and sagittal images were reconstructed. Maximum intensity projection   images were generated.    COMPARISON: Radiograph 6/18/2022. CT chest 6/6/2021 and 9/16/2020.    FINDINGS:    Tubes/Lines: Catheter via right-sided approach with its tipin the SVC.    Lungs, airways and pleura: Bilateral pleural effusions (right greater   than left).    Complete atelectasis of the right lower lobe. Passive atelectasis in the   right middle lobe and left lower lobe.    There is bilateral septal thickening and groundglass opacities. In   addition, there are bilateral 3 mm pulmonary nodules, some which have a   branching distribution.    The airways are unremarkable.    Mediastinum: The thyroid gland is unremarkable. The chest lymph nodes   measureless than 10 mm the short axis. The esophagus is unremarkable.    The heart is enlarged. Coronary artery calcifications. Mitral annular   calcification. Status post TAVR.    Upper Abdomen: Cholelithiasis. Upper abdominal ascites. Partially imaged   hypodense left renal lesions likely represent cysts.    Bones And Soft Tissues: Loop recorder.  The soft tissues are   unremarkable. Spondylosis of the thoracic spine.      IMPRESSION:    1.  Bilateral pleural effusions (right greater than left).  2.  Complete passive atelectasis of the right lower lobe.  3.  Bilateral linear atelectasis.  4.  Groundglass opacities and septal thickening are of uncertain etiology.  5.  Bilateral pulmonary nodules, some which have a branching morphology   are uncertain etiology. A follow-up is recommended in 4-6 weeks to   evaluate for resolution/change    --- End of Report ---            NEREIDA RUSSO MD; Attending Radiologist  This document has been electronically signed. Jun 19 2022 11:21AM    < end of copied text >  
                [facility]                                   [address] [place] [address]                                                      PHONE: [phone]                                                         FAX: [phone]  [email or web address]  ---------------------------------------------------------------------------------------------------------------------------------    HPI:  CHRISTIANO ELIZABETH is an 88y Male w recent AKA/adm w recurrent fevers/daughter noted irreg Herat beats/pt w hx of ?micra pacemaker.    PAST MEDICAL & SURGICAL HISTORY:  DM (diabetes mellitus)  - resolved      AV block, 1st degree      Arrhythmia      CAD (coronary artery disease)      HTN (hypertension)      VT (ventricular tachycardia)      RICHARDS (dyspnea on exertion)      Anemia      Risk factors for obstructive sleep apnea      ESRD on dialysis  HD on Mondays and Fridays      Aortic stenosis  - s/p valve replacement      GI bleeding  due to ulcerated polyps and colonic AVMs      H/O circumcision  at  age  65      H/O left knee surgery      H/O angioplasty  2013,  no  intervention      A-V fistula  left arm 5/2017      H/O carotid endarterectomy  Right      S/P TAVR (transcatheter aortic valve replacement)      History of loop recorder          Plavix (Hives)  Toprol-XL (Rash)      MEDICATIONS  (STANDING):  aspirin  chewable 81 milliGRAM(s) Oral daily  atorvastatin 80 milliGRAM(s) Oral at bedtime  fentaNYL   Patch  12 MICROgram(s)/Hr 1 Patch Transdermal every 72 hours  heparin   Injectable 5000 Unit(s) SubCutaneous every 12 hours  isosorbide   mononitrate ER Tablet (IMDUR) 30 milliGRAM(s) Oral daily  melatonin 3 milliGRAM(s) Oral at bedtime  multivitamin 1 Tablet(s) Oral daily  Nephro-pito 1 Tablet(s) Oral at bedtime  pantoprazole    Tablet 40 milliGRAM(s) Oral before breakfast  piperacillin/tazobactam IVPB.. 3.375 Gram(s) IV Intermittent every 12 hours  QUEtiapine 25 milliGRAM(s) Oral at bedtime  sevelamer carbonate 800 milliGRAM(s) Oral every 8 hours  tamsulosin 0.4 milliGRAM(s) Oral at bedtime  torsemide 20 milliGRAM(s) Oral daily  traZODone 50 milliGRAM(s) Oral at bedtime    MEDICATIONS  (PRN):  acetaminophen     Tablet .. 650 milliGRAM(s) Oral every 6 hours PRN Temp greater or equal to 38C (100.4F), Mild Pain (1 - 3)  aluminum hydroxide/magnesium hydroxide/simethicone Suspension 30 milliLiter(s) Oral every 4 hours PRN Dyspepsia  melatonin 3 milliGRAM(s) Oral at bedtime PRN Insomnia  ondansetron Injectable 4 milliGRAM(s) IV Push every 8 hours PRN Nausea and/or Vomiting      Family History: Pt denies hx of early cad, SCD, or congenital heart disease.      Social History:  Cigarettes:                    Alchohol:                 Illicit Drug Abuse:      ROS:  Constitutional: Fevers  Eyes: No eye pain, visual disturbances, or discharge  ENMT:  No difficulty hearing, tinnitus, vertigo; No sinus or throat pain  Neck: No pain or stiffness  Respiratory: No cough, wheezing, chills or hemoptysis  Cardiovascular: No chest pain, palpitations, shortness of breath, dizziness or leg swelling  Gastrointestinal: No abdominal or epigastric pain. No nausea, vomiting or hematemesis; No diarrhea or constipation. No melena or hematochezia.  Genitourinary: No dysuria, frequency, hematuria or incontinence  Rectal: No pain, hemorrhoids or incontinence  Neurological: No headaches, memory loss, loss of strength, numbness or tremors  Skin: No itching, burning, rashes or lesions   Lymph Nodes: No enlarged glands  Endocrine: No heat or cold intolerance; No hair loss  Musculoskeletal: No joint pain or swelling; No muscle, back or extremity pain  Psychiatric: No depression, anxiety, mood swings or difficulty sleeping  Heme/Lymph: No easy bruising or bleeding gums  Allergy and Immunologic: No hives or eczema    Vital Signs Last 24 Hrs  T(C): 37.5 (19 Jun 2022 08:06), Max: 38.3 (18 Jun 2022 22:28)  T(F): 99.5 (19 Jun 2022 08:06), Max: 101 (18 Jun 2022 22:28)  HR: 65 (19 Jun 2022 08:06) (50 - 88)  BP: 145/59 (19 Jun 2022 08:06) (110/40 - 145/66)  BP(mean): 68 (18 Jun 2022 22:28) (68 - 68)  RR: 18 (19 Jun 2022 08:06) (16 - 18)  SpO2: 98% (19 Jun 2022 08:06) (95% - 98%)  ICU Vital Signs Last 24 Hrs  CHRISTIANO ELIZABETH  I&O's Detail    I&O's Summary    Drug Dosing Weight  CHRISTIANO ELIZABETH      PHYSICAL EXAM:  General: Appears well developed, well nourished alert and cooperative.  HEENT: Head; normocephalic, atraumatic.  Eyes: Pupils reactive, cornea wnl.  Neck: Supple, no nodes adenopathy, no NVD or carotid bruit or thyromegaly.  CARDIOVASCULAR: Normal S1 and S2, No murmur, rub, gallop or lift.   LUNGS: No rales, rhonchi or wheeze. syst murmur  ABDOMEN: Soft, nontender without mass or organomegaly. bowel sounds normoactive.  EXTREMITIES: s/p aka  SKIN: warm and dry with normal turgor.  NEURO: Alert/oriented x 3/normal motor exam. No pathologic reflexes.    PSYCH: normal affect.        LABS:                        9.8    5.50  )-----------( 297      ( 19 Jun 2022 07:37 )             32.6     06-19    140  |  97<L>  |  28.1<H>  ----------------------------<  88  3.8   |  27.0  |  3.44<H>    Ca    9.2      19 Jun 2022 07:37  Phos  5.6     06-19  Mg     2.0     06-19    TPro  6.2<L>  /  Alb  2.8<L>  /  TBili  0.5  /  DBili  x   /  AST  25  /  ALT  16  /  AlkPhos  273<H>  06-19    CHRISTIANO ELIZABETH      PT/INR - ( 18 Jun 2022 17:17 )   PT: 16.2 sec;   INR: 1.39 ratio         PTT - ( 18 Jun 2022 17:17 )  PTT:37.2 sec      RADIOLOGY & ADDITIONAL STUDIES:    INTERPRETATION OF TELEMETRY (personally reviewed):    ECG:    ECHO:    STRESS TEST:    CARDIAC CATHETERIZATION:    Assessment and Plan:  In summary, CHRISTIANO ELIZABETH is an 88y Male with past medical history significant for recent AKA w/ fevers and pleural eff.  Hx pacemaker/daughter concerned that it may be fxning inadequately due to irreg heart beats/"all over the place ".  Pt's HR/BP ok now/will eval tomorrow.      Thank you for allowing [facility] to participate in the care of this patient.  Please feel free to call with any questions or concerns. 
[facility] DIVISION OF KIDNEY DISEASES AND HYPERTENSION -- INITIAL CONSULT NOTE  --------------------------------------------------------------------------------  HPI:  88M with PMHX ESRD/HD (Ilamathi), Hx GIB 2/2 AVM, CAD, HTN, HLD, PPM, CHFpEF, PAD s/p L Fem Bypass with cryovein c/b acute graft rethrombosis s/p thrombectomy now s/p L AKA discharged 2 days ago from Vascular Service after SICU admission for Hemorrhagic Shock s/p L AKA on Augmentin for "FUO". Patient brought back to [facility] ER for recurrent fevers despite abx with Augmentin and delirium. CXR from prior hospitalization with blunted costophrenic angles. Repeat CXR unchanged. COVID/RVP Negative. CT Chest WO done shows moderate-large pleural effusion on my review awaiting official read by Radiology. Pt evaluated by Vascular Surgery for L AKA in ER. CT Surgery consulted for effusion possible parapneumonic effusion. ID consulted. Discussed plan of care with daughter and grandson at bedside. DNR/DNI confirmed but want all other medical and surgical interventions. Agreed to reconsult Palliative Care. Family also requesting Cardiology Consult for PPM interrogation while inpt. ROS +Delirium +Insomnia (no sleep x48 hours) +Fevers. ROS negative unless mentioned.   Pt seen/examined; due for HD in AM; daughter bedside; plan for pigtail catheter for effusion;      PAST HISTORY  --------------------------------------------------------------------------------  PAST MEDICAL & SURGICAL HISTORY:  DM (diabetes mellitus)  - resolved      AV block, 1st degree      Arrhythmia      CAD (coronary artery disease)      HTN (hypertension)      VT (ventricular tachycardia)      RICHRADS (dyspnea on exertion)      Anemia      Risk factors for obstructive sleep apnea      ESRD on dialysis  HD on Mondays and Fridays      Aortic stenosis  - s/p valve replacement      GI bleeding  due to ulcerated polyps and colonic AVMs      H/O circumcision  at  age  65      H/O left knee surgery      H/O angioplasty  2013,  no  intervention      A-V fistula  left arm 5/2017      H/O carotid endarterectomy  Right      S/P TAVR (transcatheter aortic valve replacement)      History of loop recorder        FAMILY HISTORY:  Family history of cancer (Grandparent)    Family history of lung cancer (Grandparent)    Family history of premature CAD    FH: type 2 diabetes      PAST SOCIAL HISTORY:    ALLERGIES & MEDICATIONS  --------------------------------------------------------------------------------  Allergies    Plavix (Hives)  Toprol-XL (Rash)    Intolerances      Standing Inpatient Medications  aspirin  chewable 81 milliGRAM(s) Oral daily  atorvastatin 80 milliGRAM(s) Oral at bedtime  fentaNYL   Patch  12 MICROgram(s)/Hr 1 Patch Transdermal every 72 hours  heparin   Injectable 5000 Unit(s) SubCutaneous every 12 hours  isosorbide   mononitrate ER Tablet (IMDUR) 30 milliGRAM(s) Oral daily  lidocaine 1% (Preservative-free) Injectable 20 milliLiter(s) Local Injection once  melatonin 3 milliGRAM(s) Oral at bedtime  multivitamin 1 Tablet(s) Oral daily  Nephro-pito 1 Tablet(s) Oral at bedtime  pantoprazole    Tablet 40 milliGRAM(s) Oral before breakfast  piperacillin/tazobactam IVPB.. 3.375 Gram(s) IV Intermittent every 12 hours  QUEtiapine 25 milliGRAM(s) Oral at bedtime  sevelamer carbonate 800 milliGRAM(s) Oral every 8 hours  tamsulosin 0.4 milliGRAM(s) Oral at bedtime  torsemide 20 milliGRAM(s) Oral daily  traZODone 50 milliGRAM(s) Oral at bedtime    PRN Inpatient Medications  acetaminophen     Tablet .. 650 milliGRAM(s) Oral every 6 hours PRN  aluminum hydroxide/magnesium hydroxide/simethicone Suspension 30 milliLiter(s) Oral every 4 hours PRN  melatonin 3 milliGRAM(s) Oral at bedtime PRN  ondansetron Injectable 4 milliGRAM(s) IV Push every 8 hours PRN      REVIEW OF SYSTEMS  --------------------------------------------------------------------------------  Gen: No weight changes, fatigue, fevers/chills, weakness  Skin: No rashes  Head/Eyes/Ears/Mouth: No headache; Normal hearing; Normal vision w/o blurriness; No sinus pain/discomfort, sore throat  Respiratory: No dyspnea, cough, wheezing, hemoptysis  CV: No chest pain, PND, orthopnea  GI: No abdominal pain, diarrhea, constipation, nausea, vomiting, melena, hematochezia  : No increased frequency, dysuria, hematuria, nocturia  MSK: No joint pain/swelling; no back pain; no edema  Neuro: No dizziness/lightheadedness, weakness, seizures, numbness, tingling  Heme: No easy bruising or bleeding  Endo: No heat/cold intolerance  Psych: No significant nervousness, anxiety, stress, depression    All other systems were reviewed and are negative, except as noted.    VITALS/PHYSICAL EXAM  --------------------------------------------------------------------------------  T(C): 37.5 (06-19-22 @ 08:06), Max: 38.3 (06-18-22 @ 22:28)  HR: 65 (06-19-22 @ 08:06) (50 - 88)  BP: 145/59 (06-19-22 @ 08:06) (110/40 - 145/66)  RR: 18 (06-19-22 @ 08:06) (16 - 18)  SpO2: 98% (06-19-22 @ 08:06) (95% - 98%)  Wt(kg): --  Height (cm): 165.1 (06-18-22 @ 15:53)  Weight (kg): 68 (06-18-22 @ 15:53)  BMI (kg/m2): 24.9 (06-18-22 @ 15:53)  BSA (m2): 1.75 (06-18-22 @ 15:53)      Physical Exam:  	Gen: NAD   	HEENT: PERRL, supple neck, clear oropharynx  	Pulm: dec BS  	CV: RRR, S1S2; no rub  	Back: No spinal or CVA tenderness; no sacral edema  	Abd: +BS, soft, nontender/nondistended  	: No suprapubic tenderness  	UE: Warm, FROM, no clubbing, intact strength; no edema; no asterixis  	LE: +LLE AKA C/D/I Bandage in place +RLE WNL  	Neuro: No focal deficits, intact gait  	Psych: Normal affect and mood  	Skin: Warm, without rashes  	Vascular access:    LABS/STUDIES  --------------------------------------------------------------------------------              9.8    5.50  >-----------<  297      [06-19-22 @ 07:37]              32.6     140  |  97  |  28.1  ----------------------------<  88      [06-19-22 @ 07:37]  3.8   |  27.0  |  3.44        Ca     9.2     [06-19-22 @ 07:37]      Mg     2.0     [06-19-22 @ 07:37]      Phos  5.6     [06-19-22 @ 07:37]    TPro  6.2  /  Alb  2.8  /  TBili  0.5  /  DBili  x   /  AST  25  /  ALT  16  /  AlkPhos  273  [06-19-22 @ 07:37]    PT/INR: PT 16.2 , INR 1.39       [06-18-22 @ 17:17]  PTT: 37.2       [06-18-22 @ 17:17]      Creatinine Trend:  SCr 3.44 [06-19 @ 07:37]  SCr 2.70 [06-18 @ 17:17]  SCr 3.76 [06-15 @ 05:35]  SCr 2.71 [06-14 @ 02:22]  SCr 4.41 [06-13 @ 06:52]    Urinalysis - [06-14-22 @ 07:24]      Color Yellow / Appearance Clear / SG 1.010 / pH 8.0      Gluc Negative / Ketone Negative  / Bili Negative / Urobili Negative       Blood Trace / Protein 100 / Leuk Est Negative / Nitrite Negative      RBC 3-5 / WBC 3-5 / Hyaline  / Gran  / Sq Epi  / Non Sq Epi Occasional / Bacteria Occasional      Iron 43, TIBC 249, %sat 17      [07-13-21 @ 16:07]  Ferritin 498      [07-13-21 @ 16:07]  HbA1c 4.9      [08-09-18 @ 05:54]  TSH 5.55      [06-03-22 @ 15:44]    HBsAb <3.0      [06-14-22 @ 03:38]  HBsAb Nonreact      [06-17-21 @ 11:47]  HBsAg Nonreact      [06-14-22 @ 03:38]  HBcAb Nonreact      [06-14-22 @ 03:38]  HCV 0.18, Nonreact      [06-14-22 @ 03:38]    
Surgeon: Dr. Reynolds    Consult requesting by:  Dr. Galindo    HISTORY OF PRESENT ILLNESS:  89 yo M PMH ESRD on HD, anemia, TAVR, CHF with preserved EF, HTN, CAD, AFIB, PVD s/p recent bypass, LLE DVT, recently admitted with gastrointestinal bleeding, here with hemorrhagic shock related to graft bleeding s/p ligation of bleeding CryoVein fem-AT bypass, with AV graft thrombosis. Pt with recent admission to general surgery and LLE AKA. Patient has had pleural effusion in past sometime 2 years ago per family. Pt underwent CT chest which shows moderate right sided pleural effusion. Pt is resting in bed breathing comfortably but somewhat confused. Will discuss need for drainage of pleural effusion in AM.         PAST MEDICAL & SURGICAL HISTORY:  DM (diabetes mellitus)  - resolved      AV block, 1st degree      Arrhythmia      CAD (coronary artery disease)      HTN (hypertension)      VT (ventricular tachycardia)      RICHARDS (dyspnea on exertion)      Anemia      Risk factors for obstructive sleep apnea      ESRD on dialysis  HD on Mondays and Fridays      Aortic stenosis  - s/p valve replacement      GI bleeding  due to ulcerated polyps and colonic AVMs      H/O circumcision  at  age  65      H/O left knee surgery      H/O angioplasty  2013,  no  intervention      A-V fistula  left arm 5/2017      H/O carotid endarterectomy  Right      S/P TAVR (transcatheter aortic valve replacement)      History of loop recorder          MEDICATIONS  (STANDING):  aspirin  chewable 81 milliGRAM(s) Oral daily  atorvastatin 80 milliGRAM(s) Oral at bedtime  fentaNYL   Patch  12 MICROgram(s)/Hr 1 Patch Transdermal every 72 hours  heparin   Injectable 5000 Unit(s) SubCutaneous every 12 hours  isosorbide   mononitrate ER Tablet (IMDUR) 30 milliGRAM(s) Oral daily  melatonin 3 milliGRAM(s) Oral at bedtime  multivitamin 1 Tablet(s) Oral daily  Nephro-pito 1 Tablet(s) Oral at bedtime  pantoprazole    Tablet 40 milliGRAM(s) Oral before breakfast  piperacillin/tazobactam IVPB.. 3.375 Gram(s) IV Intermittent every 12 hours  QUEtiapine 25 milliGRAM(s) Oral at bedtime  sevelamer carbonate 800 milliGRAM(s) Oral every 8 hours  tamsulosin 0.4 milliGRAM(s) Oral at bedtime  torsemide 20 milliGRAM(s) Oral daily  traZODone 50 milliGRAM(s) Oral at bedtime  vancomycin  IVPB 1000 milliGRAM(s) IV Intermittent once    MEDICATIONS  (PRN):  acetaminophen     Tablet .. 650 milliGRAM(s) Oral every 6 hours PRN Temp greater or equal to 38C (100.4F), Mild Pain (1 - 3)  aluminum hydroxide/magnesium hydroxide/simethicone Suspension 30 milliLiter(s) Oral every 4 hours PRN Dyspepsia  melatonin 3 milliGRAM(s) Oral at bedtime PRN Insomnia  ondansetron Injectable 4 milliGRAM(s) IV Push every 8 hours PRN Nausea and/or Vomiting    Antiplatelet therapy:                           Last dose/amt:    Allergies    Plavix (Hives)  Toprol-XL (Rash)    Intolerances        SOCIAL HISTORY:  Smoker: [ ] Yes  [ ] No        PACK YEARS:                         WHEN QUIT? unknown   ETOH use: [ ] Yes  [ ] No              FREQUENCY / QUANTITY:  Ilicit Drug use:  [ ] Yes  [ ] No  Occupation: none  Live with: family at home   Assisted device use:  none     FAMILY HISTORY:  Family history of cancer (Grandparent)    Family history of lung cancer (Grandparent)    Family history of premature CAD    FH: type 2 diabetes        Review of Systems  CONSTITUTIONAL:  Fevers[ ] chills[ ] sweats[ ] fatigue[ ] weight loss[ ] weight gain [ ]                                     NEGATIVE [ x]   NEURO:  parathesias[ ] seizures [ ]  syncope [ ]  confusion [ ]                                                                                NEGATIVE[x ]   EYES: glasses[ ]  blurry vision[ ]  discharge[ ] pain[ ] glaucoma [ ]                                                                          NEGATIVE[x ]   ENMT:  difficulty hearing [ ]  vertigo[ ]  dysphagia[ ] epistaxis[ ] recent dental work [ ]                                    NEGATIVE[x ]   CV:  chest pain[ ] palpitations[ ] RICHARDS [ ] diaphoresis [ ]                                                                                           NEGATIVE[ ]   RESPIRATORY:  wheezing[ ] SOB[ ] cough [ ] sputum[ ] hemoptysis[ ]                                                                  NEGATIVE[ x]   GI:  nausea[ ]  vommiting [ ]  diarrhea[ ] constipation [ ] melena [ ]                                                                      NEGATIVE[x ]   : hematuria[ ]  dysuria[ ] urgency[ ] incontinence[ ]                                                                                            NEGATIVE[ x]   MUSKULOSKELETAL:  arthritis[ ]  joint swelling [ ] muscle weakness [ ]                                                                NEGATIVE[x ]   SKIN/BREAST:  rash[ ] itching [ ]  hair loss[ ] masses[ ]                                                                                              NEGATIVE[x ]   PSYCH:  dementia [ ] depresion [ ] anxiety[ ]                                                                                                               NEGATIVE[x ]   HEME/LYMPH:  bruises easily[ ] enlarged lymph nodes[ ] tender lymph nodes[ ]                                               NEGATIVE[x ]   ENDOCRINE:  cold intolerance[ ] heat intolerance[ ] polydipsia[ ]                                                                          NEGATIVE[ x]     PHYSICAL EXAM  Vital Signs Last 24 Hrs  T(C): 38.3 (18 Jun 2022 22:28), Max: 38.3 (18 Jun 2022 22:28)  T(F): 101 (18 Jun 2022 22:28), Max: 101 (18 Jun 2022 22:28)  HR: 50 (18 Jun 2022 22:28) (50 - 88)  BP: 116/43 (18 Jun 2022 22:28) (110/40 - 123/59)  BP(mean): 68 (18 Jun 2022 22:28) (68 - 68)  RR: 18 (18 Jun 2022 22:28) (16 - 18)  SpO2: 95% (18 Jun 2022 22:28) (95% - 97%)    CONSTITUTIONAL:                                                                          WNL[ ]   Neuro: A+O x 1, non-focal, speech clear and intact  HEENT: PERRL, EOMI, oral mucosa pink and moist  Neck: supple, no JVD  CV: regular rate, regular rhythm, +S1S2, no murmurs or rub, +micra leadless PPM  Pulm/chest: lung sounds CTA and equal bilaterally, no accessory muscle use noted  Abd: soft, NT, ND, +BS  Ext: MARQUEZ x 4, LLE AKA dressing over surgical site   Skin: warm, well perfused     LABS:                        11.0   5.32  )-----------( 349      ( 18 Jun 2022 17:17 )             35.9     06-18    135  |  92<L>  |  22.8<H>  ----------------------------<  136<H>  4.1   |  28.0  |  2.70<H>    Ca    10.0      18 Jun 2022 17:17    TPro  7.1  /  Alb  3.3  /  TBili  0.6  /  DBili  x   /  AST  30  /  ALT  16  /  AlkPhos  325<H>  06-18    PT/INR - ( 18 Jun 2022 17:17 )   PT: 16.2 sec;   INR: 1.39 ratio         PTT - ( 18 Jun 2022 17:17 )  PTT:37.2 sec                                
    Patient is a 88y old  Male who presents with a chief complaint of Recurrent Fevers (2022 18:24)      HPI:  88M with PMHX ESRD/HD (Ilamathi), Hx GIB 2/2 AVM, CAD, HTN, HLD, PPM, CHFpEF, PAD s/p L Fem Bypass with cryovein c/b acute graft rethrombosis s/p thrombectomy now s/p L AKA discharged 2 days ago after SICU admission for Hemorrhagic Shock s/p L AKA on Augmentin for "FUO". Patient brought back to [facility] ER for recurrent fevers despite abx with Augmentin and delirium. CXR from prior hospitalization with blunted costophrenic angles. Repeat CXR unchanged. Patient on recent admission here had left above knee amputation, and was having fevers prior to discharge, likely related to recurrent microaspiration and was sent home on Augmentin CT chest reveled left sided pleural effusion s/p chest tube . GI consulted for melena. Patient seen and evaluated at bedside, reporting no complaint. Daughter at bedside. As per daughter 1 episode of reported dark stool last night and one this moderate amount this morning. Patient known to GI service and is seeing by Gastroenterologist Dr. Evangelist Giron. Patient with a history ofr GIBs with AVMs in the past. Last EGD 21, 2/2 anemia revealed Erosions and ulceration in the pre-pyloric region compatible with erosive gastritis. Otherwise normal. 22 Sigmoidoscopy with Dr. Giron 2/2 LGIB revealed proctitis. Today, hemoglobin stable at 10.1gm. ANDRE revealed no bloody in rectal vault.        PAST MEDICAL & SURGICAL HISTORY:  DM (diabetes mellitus)  - resolved      AV block, 1st degree      Arrhythmia      CAD (coronary artery disease)      HTN (hypertension)      VT (ventricular tachycardia)      RICHARDS (dyspnea on exertion)      Anemia      Risk factors for obstructive sleep apnea      ESRD on dialysis  HD on  and       Aortic stenosis  - s/p valve replacement      GI bleeding  due to ulcerated polyps and colonic AVMs      H/O circumcision  at  age  65      H/O left knee surgery      H/O angioplasty  ,  no  intervention      A-V fistula  left arm 2017      H/O carotid endarterectomy  Right      S/P TAVR (transcatheter aortic valve replacement)      History of loop recorder          Allergies    Plavix (Hives)  Toprol-XL (Rash)    Intolerances        MEDICATIONS  (STANDING):  aspirin  chewable 81 milliGRAM(s) Oral daily  atorvastatin 80 milliGRAM(s) Oral at bedtime  chlorhexidine 2% Cloths 1 Application(s) Topical daily  epoetin juan-epbx (RETACRIT) Injectable 48889 Unit(s) IV Push <User Schedule>  fentaNYL   Patch  12 MICROgram(s)/Hr 1 Patch Transdermal every 72 hours  heparin   Injectable 5000 Unit(s) SubCutaneous every 12 hours  ibuprofen  Suspension. 400 milliGRAM(s) Oral <User Schedule>  isosorbide   mononitrate ER Tablet (IMDUR) 30 milliGRAM(s) Oral daily  lidocaine   4% Patch 1 Patch Transdermal every 24 hours  Nephro-pito 1 Tablet(s) Oral at bedtime  pantoprazole    Tablet 40 milliGRAM(s) Oral before breakfast  QUEtiapine 25 milliGRAM(s) Oral at bedtime  sevelamer carbonate 800 milliGRAM(s) Oral every 8 hours  tamsulosin 0.4 milliGRAM(s) Oral at bedtime  traZODone 50 milliGRAM(s) Oral at bedtime    MEDICATIONS  (PRN):  acetaminophen     Tablet .. 650 milliGRAM(s) Oral every 6 hours PRN Temp greater or equal to 38C (100.4F), Mild Pain (1 - 3)  ondansetron Injectable 4 milliGRAM(s) IV Push every 8 hours PRN Nausea and/or Vomiting      Social History:    Marital Status:  (   )    (   ) Single    (   )    (  )   Occupation:   Lives with: (  ) alone  (  ) children   (  ) spouse   (  ) parents  (  ) other    Substance Use (street drugs): (  ) never used  (  ) other:  Tobacco Usage:  (   ) never smoked   (   ) former smoker   (   ) current smoker  (     ) pack year  (        ) last cigarette date  Alcohol Usage:  Sexual History:       Health Management     Last Colonoscopy -      (     ) Advanced Directives: (     ) None    (      ) DNR    (     ) DNI    (     ) Health Care Proxy:     Review of Systems:    General:  No wt loss, fevers, chills, night sweats, fatigue,   CV:  No pain, palpitations, hypo/hypertension  Resp:  No dyspnea, cough, tachypnea, wheezing  GI: See HPI  :  No pain, bleeding, incontinence, nocturia  Muscle:  No pain, weakness  Neuro:  No weakness, tingling, memory problems  Psych:  No fatigue, insomnia, mood problems, depression  Endocrine:  No polyuria, polydypsia, cold/heat intolerance  Heme:  No petechiae, ecchymosis, easy bruisability  Skin:  No rash, tattoos, scars, edema      Vital Signs Last 24 Hrs  T(C): 36.7 (2022 12:25), Max: 38.5 (2022 22:53)  T(F): 98 (2022 12:25), Max: 101.3 (2022 22:53)  HR: 54 (2022 12:25) (54 - 62)  BP: 135/52 (2022 12:25) (135/52 - 164/66)  BP(mean): --  RR: 18 (2022 12:25) (18 - 18)  SpO2: 95% (2022 12:25) (95% - 96%)    PHYSICAL EXAM:    Constitutional: Elderly and frail male, in NAD  Neck: No LAD, supple  Respiratory: CTA and P  Cardiovascular: S1 and S2, RRR, no M  Gastrointestinal: BS+, soft, NT/ND, neg HSM,  Extremities: No peripheral edema, neg clubbing, cyanosis  Vascular: 2+ peripheral pulses  Neurological: A/O x 3, no focal deficits  Psychiatric: Normal mood, normal affect  Skin: No rashes  ANDRE: revealed no blood in rectal vault      LABS:                        10.1   8.90  )-----------( 411      ( 2022 05:40 )             34.0     07-02    141  |  98  |  72.6<H>  ----------------------------<  130<H>  4.5   |  24.0  |  4.32<H>    Ca    10.2      2022 05:40  Phos  4.2     07-02  Mg     2.4     07-02              RADIOLOGY & ADDITIONAL TESTS:        < from: EGD (21 @ 12:22) >  EGD Report Date: 2021 12:22 PM     Patient Name: CHRISTIANO ELIZABETH     MRN: [id number]     Account Number: [id number]    Gender: Male      (age): 1934 (87)     Instrument(s): GIF  (3604)([id number])        Attending/Fellow:     Evangelist Giron MD         Procedure Room #: 1        History of Present Illness:     The patient is having EGD for anemia. No complaints today. History of ESRD,    gastric avms and colon diverticulosis.        Administered Medications: As per Anesthesiology Record         Indications: Anemia: 285.9 - D64.9    Procedure:     The procedure, indications, preparation and potential complications were    explained to the patient, who indicated understanding and signed the    corresponding consent forms. MAC was administered by anesthesiologist.    Continuous pulse oximetry and blood pressure monitoring were used throughout the    procedure. Supplemental oxygen was used. Patient was placed in the left lateral    decubitus position. The endoscope was introduced through the mouth and advanced    under direct visualization until the second part of the duodenum was reached.    Patient tolerance to the procedure was good. The procedure was not difficult.    Blood loss was minimal. Specimen:Gastric antrum            Limitations/Complications: There were no apparent limitations or complications    Findings:     Esophagus Mucosa Normal mucosa was noted in the whole esophagus.     Stomach Mucosa Localized erosions and ulceration of the mucosa was noted in the    pre-pyloric region. These findings are compatible with erosive gastritis.     Additional stomach findings Otherwise normal stomach..     Duodenum Mucosa Normal mucosa was noted in the whole examined duodenum.           Impressions:      Normal mucosa in the whole esophagus.      Erosions and ulceration in the pre-pyloric region compatible with erosive    gastritis.      Normal mucosa in the whole examined duodenum.      Otherwise normal stomach. .         Plan: Await pathology results.    Advance diet as tolerated           Evangelist Giron MD      Version 1, Electronically signed on 2021 2:33:38 PM by Evangelist Giron MD    < end of copied text >        < from: Colonoscopy (22 @ 00:00) >    Colonoscopy Report Date: 2022     Patient Name: CHRISTIANO ELIZABETH     MRN: [id number]     Account Number: [id number]    Gender: Male      (age): 1934 (87)     Instrument(s): F [id number] ([id number])([id number])        Attending/Fellow:     Evangelist Giron MD           Referring Physician:     SEPIDEH ERVIN    [address] [place] [id number]    [phone] (phone)    [phone] (fax)           History of Present Illness:     The patient was admitted with LGIB with possible anorectal etiology on CT angio.    He is being transfused and transferred to MICU.           Administered Medications: As per Anesthesiology Record         Indications: GI Bleed: 578.9 - K92.2    Procedure:     undergoing a diagnostic colonoscopy.         The procedure, indications, preparation and potential complications were    explained to the patient, who indicated understanding and signed the    corresponding consent forms. MAC was administered by the anesthesiologist.    Continuous pulse oximetry and blood pressure monitoring were used throughout the    procedure. Supplemental oxygen was used. The quality of preparation was poor.    Patient was placed in left lateral decubitus position. Digital exam was normal.    The colonoscope was introduced through the rectum and advanced under direct    visualization until distal sigmoid colon was reached. Patient tolerance to    procedure was excellent. The procedure was not difficult. Blood loss was none..            Wellington Bowel Preparation Score:     Using the Wellington Bowel Preparation Score, each segment of the colon was graded    as follows:    Left Colon: Poor, 1 | Transverse Colon: NA  | Right Colon: NA            Limitations/Complications: There were no apparent limitations or complications    Findings:     Additional findings Large amount of stool noted in the rectum. The rectosigmoid    area was irrigated with water and careful examination of the mucosa was    performed. There was irrigated area in the anorectal junction. This may be    leading to bleeding. As it was at dentate line, we decided not to inject or clip    the area.             Impressions:      Large amount of stool noted in the rectum. The rectosigmoid area was irrigated    with water and careful examination of the mucosa was performed. There was    irrigated area in the anorectal junction. This may be leading to bleeding. As it    was at dentate line, we decided not to inject or clip the area.         Plan: Clear Liquid Diet     Anusol suppository    Miralax on daily basis    Trial of heparin tomorrow             Evangelist Giron MD      Version 1, Electronically signed on 2022 5:25:03 PM by Evangelist Giron MD    < end of copied text >

## 2022-07-02 NOTE — PROGRESS NOTE ADULT - SUBJECTIVE AND OBJECTIVE BOX
CHRISTIANO ELIZABETH Patient is a 88y old  Male who presents with a chief complaint of Recurrent Fevers (02 Jul 2022 16:02)     HPI:  88M with PMHX ESRD/HD ([facility]), Hx GIB 2/2 AVM, CAD, HTN, HLD, PPM, CHFpEF, PAD s/p L Fem Bypass with cryovein c/b acute graft rethrombosis s/p thrombectomy now s/p L AKA discharged 2 days ago from Vascular Service after SICU admission for Hemorrhagic Shock s/p L AKA on Augmentin for "FUO". Patient brought back to [facility] ER for recurrent fevers despite abx with Augmentin and delirium. CXR from prior hospitalization with blunted costophrenic angles. Repeat CXR unchanged. COVID/RVP Negative. CT Chest WO done shows moderate-large pleural effusion on my review awaiting official read by Radiology. Pt evaluated by Vascular Surgery for L AKA in ER. CT Surgery consulted for effusion possible parapneumonic effusion. ID consulted. Discussed plan of care with daughter and grandson at bedside. DNR/DNI confirmed but want all other medical and surgical interventions. Agreed to reconsult Palliative Care. Family also requesting Cardiology Consult for PPM interrogation while inpt. ROS +Delirium +Insomnia (no sleep x48 hours) +Fevers. ROS negative unless mentioned.  (18 Jun 2022 23:40)    The patient was seen and evaluated   The patient is in no acute distress.      I&O's Summary    01 Jul 2022 07:01  -  02 Jul 2022 07:00  --------------------------------------------------------  IN: 150 mL / OUT: 200 mL / NET: -50 mL    02 Jul 2022 07:01  -  02 Jul 2022 17:46  --------------------------------------------------------  IN: 0 mL / OUT: 1000 mL / NET: -1000 mL      Allergies    Plavix (Hives)  Toprol-XL (Rash)    Intolerances      HEALTH ISSUES - PROBLEM Dx:  Pleural effusion    Melena          PAST MEDICAL & SURGICAL HISTORY:  DM (diabetes mellitus)  - resolved      AV block, 1st degree      Arrhythmia      CAD (coronary artery disease)      HTN (hypertension)      VT (ventricular tachycardia)      RICHARDS (dyspnea on exertion)      Anemia      Risk factors for obstructive sleep apnea      ESRD on dialysis  HD on Mondays and Fridays      Aortic stenosis  - s/p valve replacement      GI bleeding  due to ulcerated polyps and colonic AVMs      H/O circumcision  at  age  65      H/O left knee surgery      H/O angioplasty  2013,  no  intervention      A-V fistula  left arm 5/2017      H/O carotid endarterectomy  Right      S/P TAVR (transcatheter aortic valve replacement)      History of loop recorder              Vital Signs Last 24 Hrs  T(C): 38.2 (02 Jul 2022 16:14), Max: 38.5 (01 Jul 2022 22:53)  T(F): 100.8 (02 Jul 2022 16:14), Max: 101.3 (01 Jul 2022 22:53)  HR: 68 (02 Jul 2022 16:14) (54 - 68)  BP: 156/77 (02 Jul 2022 16:14) (113/58 - 164/66)  BP(mean): --  RR: 18 (02 Jul 2022 16:14) (18 - 18)  SpO2: 100% (02 Jul 2022 16:14) (95% - 100%)T(C): 38.2 (07-02-22 @ 16:14), Max: 38.5 (07-01-22 @ 22:53)  HR: 68 (07-02-22 @ 16:14) (54 - 68)  BP: 156/77 (07-02-22 @ 16:14) (113/58 - 164/66)  RR: 18 (07-02-22 @ 16:14) (18 - 18)  SpO2: 100% (07-02-22 @ 16:14) (95% - 100%)  Wt(kg): --    PHYSICAL EXAM:   elderly male, sitting in chair, not in acute distress, weak, frail  HEAD:  Atraumatic, Normocephalic  EYES: EOMI, PERRLA, conjunctiva and sclera clear  ENMT: Moist mucous membranes  NECK: Supple   NERVOUS SYSTEM:  Alert & Oriented X1-2, confused  CHEST/LUNG: coarse breath sounds, right permacath in place  HEART: Regular rate and rhythm; + S1/S2  ABDOMEN: Soft, Nontender, Nondistended; Bowel sounds present  EXTREMITIES:  no RLE edema, left AKA stump with staples in tact and no wound dehiscence     acetaminophen     Tablet .. 650 milliGRAM(s) Oral every 6 hours PRN  aspirin  chewable 81 milliGRAM(s) Oral daily  atorvastatin 80 milliGRAM(s) Oral at bedtime  chlorhexidine 2% Cloths 1 Application(s) Topical daily  epoetin juan-epbx (RETACRIT) Injectable 58524 Unit(s) IV Push <User Schedule>  fentaNYL   Patch  12 MICROgram(s)/Hr 1 Patch Transdermal every 72 hours  heparin   Injectable 5000 Unit(s) SubCutaneous every 12 hours  ibuprofen  Suspension. 400 milliGRAM(s) Oral <User Schedule>  isosorbide   mononitrate ER Tablet (IMDUR) 30 milliGRAM(s) Oral daily  lidocaine   4% Patch 1 Patch Transdermal every 24 hours  Nephro-pito 1 Tablet(s) Oral at bedtime  ondansetron Injectable 4 milliGRAM(s) IV Push every 8 hours PRN  pantoprazole    Tablet 40 milliGRAM(s) Oral before breakfast  QUEtiapine 25 milliGRAM(s) Oral at bedtime  sevelamer carbonate 800 milliGRAM(s) Oral every 8 hours  tamsulosin 0.4 milliGRAM(s) Oral at bedtime  traZODone 50 milliGRAM(s) Oral at bedtime      LABS:                          9.5    9.53  )-----------( 450      ( 02 Jul 2022 13:20 )             30.8     07-02    141  |  98  |  72.6<H>  ----------------------------<  130<H>  4.5   |  24.0  |  4.32<H>    Ca    10.2      02 Jul 2022 05:40  Phos  4.2     07-02  Mg     2.4     07-02                CAPILLARY BLOOD GLUCOSE          RADIOLOGY & ADDITIONAL TESTS:      Consultant notes reviewed    Case discussed with consultant/provider/ family /patient

## 2022-07-03 NOTE — PROGRESS NOTE ADULT - ASSESSMENT
88 year old male with PMH of ESRD on HD (TTS), Hx GIB 2/2 AVM, CAD, HTN, HLD, PPM, CHFpEF, PAD s/p L Fem Bypass with cryovein c/b acute graft rethrombosis s/p thrombectomy now s/p L AKA discharged 2 days ago from Vascular Service after SICU admission for Hemorrhagic Shock s/p L AKA on Augmentin for "FUO" discharged two days then readmitted for Recurrent Fevers and AMS in the setting of a right pleural effusion.     #Fevers- continuing ; unclear etiology; rule out OM of left stump vs drug fever-  -pleural fluid culture negative  -repeat blood cultures off antibiotics negative, stump culture negative  -CT chest reporting ground glass opacities consistent with PNA; although patient continued to spike despite broad spectrum abx  -UA negative  -CT abd/pelvis negative for acute pathology      #Acute Hypoxic Respiratory Failure due to Pleural Effusions  -s/p right chest tube which was removed on 6/23- removed about 1500cc  -repeat CT chest with stable effusion and ground glass opacities consistent with PNA  -repeat CT chest on 7/29 with increasing ground glass opacities but per radiologist are not consistent with PNA ?  -pleural fluid culture and cytology negative    #PAD s/p L Fem Bypass with cryovein c/b acute graft rethrombosis s/p thrombectomy now s/p L AKA  stump site clean without acute concerns for infection although CT with concerns for OM- culture fluid negative for growth  MRI of left stump today  Continue aspirin and statin  Analgesia as needed  Vascular Surgery recs appreciated     #Hx LLE DVT-Eliquis -Held 2/2 to hemorrhagic shock    #ESRD on HD (TTS)  HD tentatively scheduled for 7/4; UF goal per renal  Midodrine to assist with UF  Continue torsemide, Nephrovite daily, Sevelamer TID with meals     #Chronic Diastolic Heart Failure   euvolemic; UF goal per renal  Continue Torsemide  Continue Imdur    #Intermittent Delirium likely in the setting of fevers and hospital confinement   increasingly getting more forgetful in the past few months likely due to cognitive impairment at baseline  Continue Melatonin 3mg, Trazodone 50 mg and Seroquel 25mg    # CAD  -8 beats of NSVT on 7/30 -no further events overnight or today  -continue aspirin and statin    #HLD  -continue statin    #HTN  -BP stable on midodrine; wean as tolerated  -continue torsemide and Imdur    #Constipation- had large BM with blood yesterday    #BPH  -continue Flomax  #Anemia of chronic kidney disease  -Hb stable  #Chronic Pain Syndrome   fentanyl and lidocaine patches    DVT ppx - Heparin SC   88 year old male with PMH of ESRD on HD (TTS), Hx GIB 2/2 AVM, CAD, HTN, HLD, PPM, CHFpEF, PAD s/p L Fem Bypass with cryovein c/b acute graft rethrombosis s/p thrombectomy now s/p L AKA discharged 2 days ago from Vascular Service after SICU admission for Hemorrhagic Shock s/p L AKA on Augmentin for "FUO" discharged two days then readmitted for Recurrent Fevers and AMS in the setting of a right pleural effusion.       After extensive discussions with yesterday and today with patient's wife and daughter the decision was made for not pursuing any further aggressive treatments  and discharge home with home care and then transitioning to home hospice with [facility] referral AFTER hd TOMORROW POSSIBLE dc 7/5     #Fevers- continuing ; unclear etiology; rule out OM of left stump vs drug fever-  -pleural fluid culture negative  -repeat blood cultures off antibiotics negative, stump culture negative  -CT chest reporting ground glass opacities consistent with PNA; although patient continued to spike despite broad spectrum abx  -UA negative  -CT abd/pelvis negative for acute pathology      #Acute Hypoxic Respiratory Failure due to Pleural Effusions  -s/p right chest tube which was removed on 6/23- removed about 1500cc  -repeat CT chest with stable effusion and ground glass opacities consistent with PNA  -repeat CT chest on 7/29 with increasing ground glass opacities but per radiologist are not consistent with PNA ?  -pleural fluid culture and cytology negative    #PAD s/p L Fem Bypass with cryovein c/b acute graft rethrombosis s/p thrombectomy now s/p L AKA  stump site clean without acute concerns for infection although CT with concerns for OM- culture fluid negative for growth  MRI of left stump today  Continue aspirin and statin  Analgesia as needed  Vascular Surgery recs appreciated     #Hx LLE DVT-Eliquis -Held 2/2 to hemorrhagic shock    #ESRD on HD (TTS)  HD tentatively scheduled for 7/4; UF goal per renal  Midodrine to assist with UF  Continue torsemide, Nephrovite daily, Sevelamer TID with meals     #Chronic Diastolic Heart Failure   euvolemic; UF goal per renal  Continue Torsemide  Continue Imdur    #Intermittent Delirium likely in the setting of fevers and hospital confinement   increasingly getting more forgetful in the past few months likely due to cognitive impairment at baseline  Continue Melatonin 3mg, Trazodone 50 mg and Seroquel 25mg    # CAD  -8 beats of NSVT on 7/30 -no further events overnight or today  -continue aspirin and statin    #HLD  -continue statin    #HTN  -BP stable on midodrine; wean as tolerated  -continue torsemide and Imdur    #Constipation- had large BM with blood yesterday    #BPH  -continue Flomax  #Anemia of chronic kidney disease  -Hb stable  #Chronic Pain Syndrome   fentanyl and lidocaine patches    DVT ppx - Heparin SC

## 2022-07-03 NOTE — PROGRESS NOTE ADULT - ASSESSMENT
Patient is a 88 year old male with PMH of ESRD on HD (TTS), Hx GIB 2/2 AVM, CAD, HTN, HLD, PPM, CHFpEF, PAD s/p L Fem Bypass with cryovein c/b acute graft rethrombosis s/p thrombectomy now s/p L AKA discharged 2 days ago from Vascular Service after SICU admission for Hemorrhagic Shock s/p L AKA on Augmentin for "FUO" discharged two days then readmitted for Recurrent Fevers and AMS in the setting of a right pleural effusion. GI consulted for melena.

## 2022-07-03 NOTE — PROGRESS NOTE ADULT - SUBJECTIVE AND OBJECTIVE BOX
Patient is an  88y old  Male who presents with a chief complaint of Recurrent Fever (03 Jul 2022 11:54)     HPI:  88M with PMHX ESRD/HD ([facility]), Hx GIB 2/2 AVM, CAD, HTN, HLD, PPM, CHFpEF, PAD s/p L Fem Bypass with cryovein c/b acute graft rethrombosis s/p thrombectomy now s/p L AKA discharged 2 days ago from Vascular Service after SICU admission for Hemorrhagic Shock s/p L AKA on Augmentin for "FUO". Patient brought back to [facility] ER for recurrent fevers despite abx with Augmentin and delirium. CXR from prior hospitalization with blunted costophrenic angles. Repeat CXR unchanged. COVID/RVP Negative. CT Chest WO done shows moderate-large pleural effusion on my review awaiting official read by Radiology. Pt evaluated by Vascular Surgery for L AKA in ER. CT Surgery consulted for effusion possible parapneumonic effusion. ID consulted. Discussed plan of care with daughter and grandson at bedside. DNR/DNI confirmed but want all other medical and surgical interventions. Agreed to reconsult Palliative Care. Family also requesting Cardiology Consult for PPM interrogation while inpt. ROS +Delirium +Insomnia (no sleep x48 hours) +Fevers. ROS negative unless mentioned.  (18 Jun 2022 23:40)    The patient is in no acute distress.    Denied any fever chest pain, palpitations, shortness of breath, abdominal pain,      I&O's Summary    02 Jul 2022 07:01  -  03 Jul 2022 07:00  --------------------------------------------------------  IN: 0 mL / OUT: 1000 mL / NET: -1000 mL    Allergies    Plavix (Hives)  Toprol-XL (Rash)    Intolerances    HEALTH ISSUES - PROBLEM Dx:    Pleural effusion    Melena    PAST MEDICAL & SURGICAL HISTORY:  DM (diabetes mellitus)  - resolved      AV block, 1st degree      Arrhythmia      CAD (coronary artery disease)      HTN (hypertension)      VT (ventricular tachycardia)      RICHARDS (dyspnea on exertion)      Anemia      Risk factors for obstructive sleep apnea      ESRD on dialysis  HD on Mondays and Fridays      Aortic stenosis  - s/p valve replacement      GI bleeding  due to ulcerated polyps and colonic AVMs      H/O circumcision  at  age  65      H/O left knee surgery      H/O angioplasty  2013,  no  intervention      A-V fistula  left arm 5/2017      H/O carotid endarterectomy  Right      S/P TAVR (transcatheter aortic valve replacement)      History of loop recorder    Vital Signs Last 24 Hrs  T(C): 37.8 (03 Jul 2022 10:25), Max: 38.3 (02 Jul 2022 21:10)  T(F): 100.1 (03 Jul 2022 10:25), Max: 100.9 (02 Jul 2022 21:10)  HR: 70 (03 Jul 2022 10:25) (60 - 70)  BP: 136/73 (03 Jul 2022 10:25) (119/53 - 156/77)    RR: 17 (03 Jul 2022 10:25) (16 - 18)  SpO2: 95% (03 Jul 2022 10:25) (94% - 100%)T(C): 37.8 (07-03-22 @ 10:25), Max: 38.3 (07-02-22 @ 21:10)  HR: 70 (07-03-22 @ 10:25) (60 - 70)  BP: 136/73 (07-03-22 @ 10:25) (119/53 - 156/77)  RR: 17 (07-03-22 @ 10:25) (16 - 18)  SpO2: 95% (07-03-22 @ 10:25) (94% - 100%)      PHYSICAL EXAM:    GENERAL: NAD, ill appearing cachectic   HEAD:  Atraumatic, Normocephalic  EYES: EOMI, PERRL, conjunctiva and sclera clear  ENMT:  Moist mucous membranes,  No lesions  NECK: Supple, No JVD, Normal thyroid  NERVOUS SYSTEM:  Alert & Oriented X3, brely ever Moves upper and lower extremities; CNS-II-XII  CHEST/LUNG: Clear to auscultation bilaterally; No rales, rhonchi, wheezing,   HEART: Regular rate and rhythm; No murmurs,   ABDOMEN: Soft, Nontender, Nondistended; Bowel sounds present  EXTREMITIES:  Peripheral Pulses, No  cyanosis, or edema  SKIN: No rashes or lesions  psychiatry- mood and affect appropriate- depressed     acetaminophen     Tablet .. 650 milliGRAM(s) Oral every 6 hours PRN  aspirin  chewable 81 milliGRAM(s) Oral daily  atorvastatin 80 milliGRAM(s) Oral at bedtime  chlorhexidine 2% Cloths 1 Application(s) Topical daily  epoetin juan-epbx (RETACRIT) Injectable 07680 Unit(s) IV Push <User Schedule>  fentaNYL   Patch  12 MICROgram(s)/Hr 1 Patch Transdermal every 72 hours  FIRST- Mouthwash  BLM 15 milliLiter(s) Swish and Spit three times a day  heparin   Injectable 5000 Unit(s) SubCutaneous every 12 hours  isosorbide   mononitrate ER Tablet (IMDUR) 30 milliGRAM(s) Oral daily  lidocaine   4% Patch 1 Patch Transdermal every 24 hours  Nephro-pito 1 Tablet(s) Oral at bedtime  nystatin    Suspension 734440 Unit(s) Swish and Swallow three times a day  ondansetron Injectable 4 milliGRAM(s) IV Push every 8 hours PRN  pantoprazole    Tablet 40 milliGRAM(s) Oral before breakfast  QUEtiapine 25 milliGRAM(s) Oral at bedtime  sevelamer carbonate 800 milliGRAM(s) Oral every 8 hours  tamsulosin 0.4 milliGRAM(s) Oral at bedtime  traZODone 50 milliGRAM(s) Oral at bedtime      LABS:                          10.3   11.45 )-----------( 437      ( 03 Jul 2022 08:56 )             34.2     07-02    141  |  98  |  72.6<H>  ----------------------------<  130<H>  4.5   |  24.0  |  4.32<H>    Ca    10.2      02 Jul 2022 05:40  Phos  4.2     07-02  Mg     2.4     07-02    88 year old male with PMH of ESRD on HD (TTS), Hx GIB 2/2 AVM, CAD, HTN, HLD, PPM, CHFpEF, PAD s/p L Fem Bypass with cryovein c/b acute graft rethrombosis s/p thrombectomy now s/p L AKA discharged 2 days ago from Vascular Service after SICU admission for Hemorrhagic Shock s/p L AKA on Augmentin for "FUO" discharged two days then readmitted for Recurrent Fevers and AMS in the setting of a right pleural effusion.     #Fever- continuing ; unclear etiology; rule out OM of left stump vs drug fever-    #Acute Hypoxic Respiratory Failure due to Pleural Effusions,    -S/P  right chest tube which was removed on 6/23- removed about 1500cc    #PAD s/p L Fem Bypass with cryovein c/b acute graft rethrombosis s/p thrombectomy now s/p L AKA  stump site clean without acute concerns for infection although CT with concerns for OM- culture fluid negative for growth    #Hx LLE DVT    #ESRD on HD (TTS)    #Chronic Diastolic Heart Failure    #Intermittent Delirium likely in the setting of fevers and hospital confinement     # CAD    #HLD  -continue statin    #HTN  -BP stable on midodrine; wean as tolerated    #Anemia of chronic kidney disease  -Hb stable

## 2022-07-03 NOTE — PROGRESS NOTE ADULT - ASSESSMENT
HD in AM,     D/C [facility] &  P + Binders,     D/W Dr. Elise,    P : Withdrawal of HD, after AM,     Home Hospice,

## 2022-07-03 NOTE — PROGRESS NOTE ADULT - SUBJECTIVE AND OBJECTIVE BOX
Chief Complaint:  Patient is a 88y old  Male who presents with a chief complaint of Recurrent Fevers (02 Jul 2022 17:46)      Interval Events / Subjective: Patient seen and evaluated at bedside, reporting no complaints, no overnight events. As per RN, 1 episode of dark BM overnight. At this time, Patient denies nausea, vomiting, abdominal pain, chest pain, shortness of breath. Hemoglobin stable at 10.3gm.       REVIEW OF SYSTEMS:   General: Negative  HEENT: Negative  CV: Negative  Respiratory: Negative  GI: See HPI  : Negative  MSK: Negative  Hematologic: Negative  Skin: Negative    MEDICATIONS:   MEDICATIONS  (STANDING):  aspirin  chewable 81 milliGRAM(s) Oral daily  atorvastatin 80 milliGRAM(s) Oral at bedtime  chlorhexidine 2% Cloths 1 Application(s) Topical daily  epoetin juan-epbx (RETACRIT) Injectable 48352 Unit(s) IV Push <User Schedule>  fentaNYL   Patch  12 MICROgram(s)/Hr 1 Patch Transdermal every 72 hours  FIRST- Mouthwash  BLM 15 milliLiter(s) Swish and Spit three times a day  heparin   Injectable 5000 Unit(s) SubCutaneous every 12 hours  isosorbide   mononitrate ER Tablet (IMDUR) 30 milliGRAM(s) Oral daily  lidocaine   4% Patch 1 Patch Transdermal every 24 hours  Nephro-pito 1 Tablet(s) Oral at bedtime  nystatin    Suspension 840335 Unit(s) Swish and Swallow three times a day  pantoprazole    Tablet 40 milliGRAM(s) Oral before breakfast  QUEtiapine 25 milliGRAM(s) Oral at bedtime  sevelamer carbonate 800 milliGRAM(s) Oral every 8 hours  tamsulosin 0.4 milliGRAM(s) Oral at bedtime  traZODone 50 milliGRAM(s) Oral at bedtime    MEDICATIONS  (PRN):  acetaminophen     Tablet .. 650 milliGRAM(s) Oral every 6 hours PRN Temp greater or equal to 38C (100.4F), Mild Pain (1 - 3)  ondansetron Injectable 4 milliGRAM(s) IV Push every 8 hours PRN Nausea and/or Vomiting      ALLERGIES:   Allergies    Plavix (Hives)  Toprol-XL (Rash)    Intolerances        VITAL SIGNS:   Vital Signs Last 24 Hrs  T(C): 37.8 (03 Jul 2022 10:25), Max: 38.3 (02 Jul 2022 21:10)  T(F): 100.1 (03 Jul 2022 10:25), Max: 100.9 (02 Jul 2022 21:10)  HR: 70 (03 Jul 2022 10:25) (54 - 70)  BP: 136/73 (03 Jul 2022 10:25) (113/58 - 156/77)  BP(mean): --  RR: 17 (03 Jul 2022 10:25) (16 - 18)  SpO2: 95% (03 Jul 2022 10:25) (94% - 100%)  I&O's Summary    02 Jul 2022 07:01  -  03 Jul 2022 07:00  --------------------------------------------------------  IN: 0 mL / OUT: 1000 mL / NET: -1000 mL        PHYSICAL EXAM:   GENERAL:  Elderly, thin and frail appearing male, in no distress  HEENT:  NC/AT,  conjunctivae clear, sclera -anicteric  CHEST:  Full & symmetric excursion, no increased effort, breath sounds clear  HEART:  Regular rhythm, S1, S2, no murmur/rub/S3/S4,  no edema  ABDOMEN:  Soft, non-tender, non-distended, normoactive bowel sounds  EXTREMITIES: L AKA, No cyanosis, clubbing or edema  SKIN:  No rash/erythema/ecchymoses/petechiae/wounds/abscess/warm/dry  NEURO:  Calm, cooperative    LABS:  CBC Full  -  ( 03 Jul 2022 08:56 )  WBC Count : 11.45 K/uL  RBC Count : 3.57 M/uL  Hemoglobin : 10.3 g/dL  Hematocrit : 34.2 %  Platelet Count - Automated : 437 K/uL  Mean Cell Volume : 95.8 fl  Mean Cell Hemoglobin : 28.9 pg  Mean Cell Hemoglobin Concentration : 30.1 gm/dL  Auto Neutrophil # : x  Auto Lymphocyte # : x  Auto Monocyte # : x  Auto Eosinophil # : x  Auto Basophil # : x  Auto Neutrophil % : x  Auto Lymphocyte % : x  Auto Monocyte % : x  Auto Eosinophil % : x  Auto Basophil % : x    07-02    141  |  98  |  72.6<H>  ----------------------------<  130<H>  4.5   |  24.0  |  4.32<H>    Ca    10.2      02 Jul 2022 05:40  Phos  4.2     07-02  Mg     2.4     07-02              Culture - Abscess with Gram Stain (collected 01 Jul 2022 22:15)  Source: .Abscess left lower extremity  fluid collection  Preliminary Report (02 Jul 2022 18:49):    No growth        RADIOLOGY & ADDITIONAL STUDIES (The following images were personally reviewed):

## 2022-07-03 NOTE — PROGRESS NOTE ADULT - PROBLEM SELECTOR PLAN 1
Most likely from hemorrhoidal bleed vs diverticular bleed. As per RN, 1 episode of dark BM yesterday. Hemoglobin stable at 10.3gm. No evidence of overt GI bleeding.   Patient known to GI service and is seeing by Gastroenterologist Dr. Evangelist Giron. Patient with a history ofr GIBs with AVMs in the past.   Last EGD 11/23/21, 2/2 anemia revealed Erosions and ulceration in the pre-pyloric region compatible with erosive gastritis. Otherwise normal.   5/9/22 Sigmoidoscopy with Dr. Giron 2/2 LGIB revealed proctitis.     Plan:  Will continue to trend CBC, transfuse as needed  Diet as tolerated   Continue Pantoprazole daily  No endoscopic interventions needed.

## 2022-07-03 NOTE — PROGRESS NOTE ADULT - SUBJECTIVE AND OBJECTIVE BOX
CHRISTIANO ELIZABETH Patient is a 88y old  Male who presents with a chief complaint of Recurrent Fevers (03 Jul 2022 11:54)     HPI:  88M with PMHX ESRD/HD ([facility]), Hx GIB 2/2 AVM, CAD, HTN, HLD, PPM, CHFpEF, PAD s/p L Fem Bypass with cryovein c/b acute graft rethrombosis s/p thrombectomy now s/p L AKA discharged 2 days ago from Vascular Service after SICU admission for Hemorrhagic Shock s/p L AKA on Augmentin for "FUO". Patient brought back to [facility] ER for recurrent fevers despite abx with Augmentin and delirium. CXR from prior hospitalization with blunted costophrenic angles. Repeat CXR unchanged. COVID/RVP Negative. CT Chest WO done shows moderate-large pleural effusion on my review awaiting official read by Radiology. Pt evaluated by Vascular Surgery for L AKA in ER. CT Surgery consulted for effusion possible parapneumonic effusion. ID consulted. Discussed plan of care with daughter and grandson at bedside. DNR/DNI confirmed but want all other medical and surgical interventions. Agreed to reconsult Palliative Care. Family also requesting Cardiology Consult for PPM interrogation while inpt. ROS +Delirium +Insomnia (no sleep x48 hours) +Fevers. ROS negative unless mentioned.  (18 Jun 2022 23:40)    The patient was seen and evaluated "states its almost over"- i told the patient he will be going home and he opened says and when asked what he wants most he states he wants to go home and  when asked what he wants to eat he says strawberry ice-cream and veal cutlet and sausages   The patient is in no acute distress.  Denied any fever chest pain, palpitations, shortness of breath, abdominal pain,        I&O's Summary    02 Jul 2022 07:01  -  03 Jul 2022 07:00  --------------------------------------------------------  IN: 0 mL / OUT: 1000 mL / NET: -1000 mL      Allergies    Plavix (Hives)  Toprol-XL (Rash)    Intolerances      HEALTH ISSUES - PROBLEM Dx:  Pleural effusion    Melena          PAST MEDICAL & SURGICAL HISTORY:  DM (diabetes mellitus)  - resolved      AV block, 1st degree      Arrhythmia      CAD (coronary artery disease)      HTN (hypertension)      VT (ventricular tachycardia)      RICHARDS (dyspnea on exertion)      Anemia      Risk factors for obstructive sleep apnea      ESRD on dialysis  HD on Mondays and Fridays      Aortic stenosis  - s/p valve replacement      GI bleeding  due to ulcerated polyps and colonic AVMs      H/O circumcision  at  age  65      H/O left knee surgery      H/O angioplasty  2013,  no  intervention      A-V fistula  left arm 5/2017      H/O carotid endarterectomy  Right      S/P TAVR (transcatheter aortic valve replacement)      History of loop recorder              Vital Signs Last 24 Hrs  T(C): 37.8 (03 Jul 2022 10:25), Max: 38.3 (02 Jul 2022 21:10)  T(F): 100.1 (03 Jul 2022 10:25), Max: 100.9 (02 Jul 2022 21:10)  HR: 70 (03 Jul 2022 10:25) (60 - 70)  BP: 136/73 (03 Jul 2022 10:25) (119/53 - 156/77)  BP(mean): --  RR: 17 (03 Jul 2022 10:25) (16 - 18)  SpO2: 95% (03 Jul 2022 10:25) (94% - 100%)T(C): 37.8 (07-03-22 @ 10:25), Max: 38.3 (07-02-22 @ 21:10)  HR: 70 (07-03-22 @ 10:25) (60 - 70)  BP: 136/73 (07-03-22 @ 10:25) (119/53 - 156/77)  RR: 17 (07-03-22 @ 10:25) (16 - 18)  SpO2: 95% (07-03-22 @ 10:25) (94% - 100%)  Wt(kg): --    PHYSICAL EXAM:    GENERAL: NAD, ill appearing cachectic   HEAD:  Atraumatic, Normocephalic  EYES: EOMI, PERRL, conjunctiva and sclera clear  ENMT:  Moist mucous membranes,  No lesions  NECK: Supple, No JVD, Normal thyroid  NERVOUS SYSTEM:  Alert & Oriented X3, brely ever Moves upper and lower extremities; CNS-II-XII  CHEST/LUNG: Clear to auscultation bilaterally; No rales, rhonchi, wheezing,   HEART: Regular rate and rhythm; No murmurs,   ABDOMEN: Soft, Nontender, Nondistended; Bowel sounds present  EXTREMITIES:  Peripheral Pulses, No  cyanosis, or edema  SKIN: No rashes or lesions  psychiatry- mood and affect appropriate- depressed     acetaminophen     Tablet .. 650 milliGRAM(s) Oral every 6 hours PRN  aspirin  chewable 81 milliGRAM(s) Oral daily  atorvastatin 80 milliGRAM(s) Oral at bedtime  chlorhexidine 2% Cloths 1 Application(s) Topical daily  epoetin juan-epbx (RETACRIT) Injectable 20199 Unit(s) IV Push <User Schedule>  fentaNYL   Patch  12 MICROgram(s)/Hr 1 Patch Transdermal every 72 hours  FIRST- Mouthwash  BLM 15 milliLiter(s) Swish and Spit three times a day  heparin   Injectable 5000 Unit(s) SubCutaneous every 12 hours  isosorbide   mononitrate ER Tablet (IMDUR) 30 milliGRAM(s) Oral daily  lidocaine   4% Patch 1 Patch Transdermal every 24 hours  Nephro-pito 1 Tablet(s) Oral at bedtime  nystatin    Suspension 304889 Unit(s) Swish and Swallow three times a day  ondansetron Injectable 4 milliGRAM(s) IV Push every 8 hours PRN  pantoprazole    Tablet 40 milliGRAM(s) Oral before breakfast  QUEtiapine 25 milliGRAM(s) Oral at bedtime  sevelamer carbonate 800 milliGRAM(s) Oral every 8 hours  tamsulosin 0.4 milliGRAM(s) Oral at bedtime  traZODone 50 milliGRAM(s) Oral at bedtime      LABS:                          10.3   11.45 )-----------( 437      ( 03 Jul 2022 08:56 )             34.2     07-02    141  |  98  |  72.6<H>  ----------------------------<  130<H>  4.5   |  24.0  |  4.32<H>    Ca    10.2      02 Jul 2022 05:40  Phos  4.2     07-02  Mg     2.4     07-02                CAPILLARY BLOOD GLUCOSE          RADIOLOGY & ADDITIONAL TESTS:      Consultant notes reviewed    Case discussed with consultant/provider/ family /patient

## 2022-07-03 NOTE — PROGRESS NOTE ADULT - NS ATTEND AMEND GEN_ALL_CORE FT
88M with multiple comorbids, with recent left AKA admitted with fever. GI consulted for rectal bleeding 2 episodes yesterday, none today. Hb relatively stable. On eval, patient denies abdominal pain or vomiting. Tolerating PO diet. Abdomen soft, NT and ND.   Will hold off on endoscopic eval given no ongoing bleeding.   Reviewed primary team note, family wish for home hospice care.   Will sign off   call us if there is any change in patient condition

## 2022-07-04 NOTE — PROGRESS NOTE ADULT - SUBJECTIVE AND OBJECTIVE BOX
CHRISTIANO ELIZABETH Patient is a 88y old  Male who presents with a chief complaint of Recurrent Fevers (04 Jul 2022 08:34)     HPI:  88M with PMHX ESRD/HD ([facility]), Hx GIB 2/2 AVM, CAD, HTN, HLD, PPM, CHFpEF, PAD s/p L Fem Bypass with cryovein c/b acute graft rethrombosis s/p thrombectomy now s/p L AKA discharged 2 days ago from Vascular Service after SICU admission for Hemorrhagic Shock s/p L AKA on Augmentin for "FUO". Patient brought back to [facility] ER for recurrent fevers despite abx with Augmentin and delirium. CXR from prior hospitalization with blunted costophrenic angles. Repeat CXR unchanged. COVID/RVP Negative. CT Chest WO done shows moderate-large pleural effusion on my review awaiting official read by Radiology. Pt evaluated by Vascular Surgery for L AKA in ER. CT Surgery consulted for effusion possible parapneumonic effusion. ID consulted. Discussed plan of care with daughter and grandson at bedside. DNR/DNI confirmed but want all other medical and surgical interventions. Agreed to reconsult Palliative Care. Family also requesting Cardiology Consult for PPM interrogation while inpt. ROS +Delirium +Insomnia (no sleep x48 hours) +Fevers. ROS negative unless mentioned.  (18 Jun 2022 23:40)    The patient was seen and evaluated   The patient is in no acute distress.  Denied any fever chest pain, palpitations, shortness of breath, abdominal pain, fever, dysuria, cough, edema   Complains of     I&O's Summary    03 Jul 2022 07:01  -  04 Jul 2022 07:00  --------------------------------------------------------  IN: 120 mL / OUT: 0 mL / NET: 120 mL    04 Jul 2022 07:01  -  04 Jul 2022 11:29  --------------------------------------------------------  IN: 0 mL / OUT: 1000 mL / NET: -1000 mL      Allergies    Plavix (Hives)  Toprol-XL (Rash)    Intolerances      HEALTH ISSUES - PROBLEM Dx:  Pleural effusion    Melena          PAST MEDICAL & SURGICAL HISTORY:  DM (diabetes mellitus)  - resolved      AV block, 1st degree      Arrhythmia      CAD (coronary artery disease)      HTN (hypertension)      VT (ventricular tachycardia)      RICHARDS (dyspnea on exertion)      Anemia      Risk factors for obstructive sleep apnea      ESRD on dialysis  HD on Mondays and Fridays      Aortic stenosis  - s/p valve replacement      GI bleeding  due to ulcerated polyps and colonic AVMs      H/O circumcision  at  age  65      H/O left knee surgery      H/O angioplasty  2013,  no  intervention      A-V fistula  left arm 5/2017      H/O carotid endarterectomy  Right      S/P TAVR (transcatheter aortic valve replacement)      History of loop recorder              Vital Signs Last 24 Hrs  T(C): 36.5 (04 Jul 2022 11:10), Max: 37.6 (04 Jul 2022 05:50)  T(F): 97.7 (04 Jul 2022 11:10), Max: 99.6 (04 Jul 2022 05:50)  HR: 66 (04 Jul 2022 11:10) (60 - 85)  BP: 126/50 (04 Jul 2022 11:10) (126/50 - 157/57)  BP(mean): --  RR: 18 (04 Jul 2022 11:10) (17 - 18)  SpO2: 99% (04 Jul 2022 11:10) (92% - 99%)T(C): 36.5 (07-04-22 @ 11:10), Max: 37.6 (07-04-22 @ 05:50)  HR: 66 (07-04-22 @ 11:10) (60 - 85)  BP: 126/50 (07-04-22 @ 11:10) (126/50 - 157/57)  RR: 18 (07-04-22 @ 11:10) (17 - 18)  SpO2: 99% (07-04-22 @ 11:10) (92% - 99%)  Wt(kg): --    PHYSICAL EXAM:    GENERAL: NAD, frail elderly cachectic   HEAD:  Atraumatic, Normocephalic  EYES: EOMI, PERRL, conjunctiva and sclera clear  ENMT:  Moist mucous membranes,  No lesions  NECK: Supple, No JVD, Normal thyroid  NERVOUS SYSTEM:  Alert & Oriented X3, in bed with difficulty Moves upper and lower extremities; CNS-II-XII  CHEST/LUNG: Clear to auscultation bilaterally; No rales, rhonchi, wheezing,   HEART: Regular rate and rhythm; No murmurs,   ABDOMEN: Soft, Nontender, Nondistended; Bowel sounds present  EXTREMITIES:  Peripheral Pulses, stump dry  SKIN: No rashes or lesions  psychiatry- mood and affect flat    acetaminophen     Tablet .. 650 milliGRAM(s) Oral every 6 hours PRN  aspirin  chewable 81 milliGRAM(s) Oral daily  chlorhexidine 2% Cloths 1 Application(s) Topical daily  fentaNYL   Patch  12 MICROgram(s)/Hr. 1 Patch Transdermal every 48 hours  FIRST- Mouthwash  BLM 15 milliLiter(s) Swish and Spit three times a day  heparin   Injectable 5000 Unit(s) SubCutaneous every 12 hours  isosorbide   mononitrate ER Tablet (IMDUR) 30 milliGRAM(s) Oral daily  lidocaine   4% Patch 1 Patch Transdermal every 24 hours  nystatin    Suspension 876024 Unit(s) Swish and Swallow three times a day  ondansetron Injectable 4 milliGRAM(s) IV Push every 8 hours PRN  pantoprazole    Tablet 40 milliGRAM(s) Oral before breakfast  QUEtiapine 25 milliGRAM(s) Oral at bedtime  tamsulosin 0.4 milliGRAM(s) Oral at bedtime  traZODone 50 milliGRAM(s) Oral at bedtime      LABS:                          10.3   11.45 )-----------( 437      ( 03 Jul 2022 08:56 )             34.2                     CAPILLARY BLOOD GLUCOSE          RADIOLOGY & ADDITIONAL TESTS:      Consultant notes reviewed    Case discussed with consultant/provider/ family /patient

## 2022-07-04 NOTE — PROGRESS NOTE ADULT - NUTRITIONAL ASSESSMENT
This patient has been assessed with a concern for Malnutrition and has been determined to have a diagnosis/diagnoses of Severe protein-calorie malnutrition.    This patient is being managed with:   Diet Soft and Bite Sized-  DASH/TLC {Sodium & Cholesterol Restricted} (DASH)  For patients receiving Renal Replacement - No Protein Restr No Conc K No Conc Phos Low  Sodium (RENAL)  Supplement Feeding Modality:  Oral  Nepro Cans or Servings Per Day:  1       Frequency:  Three Times a day  Entered: Jun 28 2022  9:04AM    
This patient has been assessed with a concern for Malnutrition and has been determined to have a diagnosis/diagnoses of Severe protein-calorie malnutrition.    This patient is being managed with:   Diet DASH/TLC-  Sodium & Cholesterol Restricted  Easy to Chew (EASYTOCHEW)    Start Time: 22:00  Supplement Feeding Modality:  Oral  Nepro Cans or Servings Per Day:  3       Frequency:  Three Times a day  Entered: Jun 22 2022  3:49PM    
This patient has been assessed with a concern for Malnutrition and has been determined to have a diagnosis/diagnoses of Severe protein-calorie malnutrition.    This patient is being managed with:   Diet Soft and Bite Sized-  DASH/TLC {Sodium & Cholesterol Restricted} (DASH)  For patients receiving Renal Replacement - No Protein Restr No Conc K No Conc Phos Low  Sodium (RENAL)  Supplement Feeding Modality:  Oral  Nepro Cans or Servings Per Day:  1       Frequency:  Three Times a day  Entered: Jun 28 2022  9:04AM    
This patient has been assessed with a concern for Malnutrition and has been determined to have a diagnosis/diagnoses of Severe protein-calorie malnutrition.    This patient is being managed with:   Diet Soft and Bite Sized-  DASH/TLC {Sodium & Cholesterol Restricted} (DASH)  Supplement Feeding Modality:  Oral  Nepro Cans or Servings Per Day:  1       Frequency:  Three Times a day  Entered: Jun 25 2022 11:29AM    
This patient has been assessed with a concern for Malnutrition and has been determined to have a diagnosis/diagnoses of Severe protein-calorie malnutrition.    This patient is being managed with:   Diet Soft and Bite Sized-  DASH/TLC {Sodium & Cholesterol Restricted} (DASH)  Supplement Feeding Modality:  Oral  Nepro Cans or Servings Per Day:  1       Frequency:  Three Times a day  Entered: Jun 25 2022 11:29AM    
This patient has been assessed with a concern for Malnutrition and has been determined to have a diagnosis/diagnoses of Severe protein-calorie malnutrition.    This patient is being managed with:   Diet Soft and Bite Sized-  DASH/TLC {Sodium & Cholesterol Restricted} (DASH)  For patients receiving Renal Replacement - No Protein Restr No Conc K No Conc Phos Low  Sodium (RENAL)  Supplement Feeding Modality:  Oral  Nepro Cans or Servings Per Day:  1       Frequency:  Three Times a day  Entered: Jun 28 2022  9:04AM    
This patient has been assessed with a concern for Malnutrition and has been determined to have a diagnosis/diagnoses of Severe protein-calorie malnutrition.    This patient is being managed with:   Diet Soft and Bite Sized-  DASH/TLC {Sodium & Cholesterol Restricted} (DASH)  For patients receiving Renal Replacement - No Protein Restr No Conc K No Conc Phos Low  Sodium (RENAL)  Supplement Feeding Modality:  Oral  Nepro Cans or Servings Per Day:  1       Frequency:  Three Times a day  Entered: Jun 28 2022  9:04AM    
This patient has been assessed with a concern for Malnutrition and has been determined to have a diagnosis/diagnoses of Severe protein-calorie malnutrition.    This patient is being managed with:   Diet Soft and Bite Sized-  DASH/TLC {Sodium & Cholesterol Restricted} (DASH)  Supplement Feeding Modality:  Oral  Nepro Cans or Servings Per Day:  1       Frequency:  Three Times a day  Entered: Jun 25 2022 11:29AM    
This patient has been assessed with a concern for Malnutrition and has been determined to have a diagnosis/diagnoses of Severe protein-calorie malnutrition.    This patient is being managed with:   Diet Soft and Bite Sized-  DASH/TLC {Sodium & Cholesterol Restricted} (DASH)  Supplement Feeding Modality:  Oral  Nepro Cans or Servings Per Day:  1       Frequency:  Three Times a day  Entered: Jun 25 2022 11:29AM    
This patient has been assessed with a concern for Malnutrition and has been determined to have a diagnosis/diagnoses of Severe protein-calorie malnutrition.    This patient is being managed with:   Diet Soft and Bite Sized-  DASH/TLC {Sodium & Cholesterol Restricted} (DASH)  For patients receiving Renal Replacement - No Protein Restr No Conc K No Conc Phos Low  Sodium (RENAL)  Supplement Feeding Modality:  Oral  Nepro Cans or Servings Per Day:  1       Frequency:  Three Times a day  Entered: Jun 28 2022  9:04AM    
This patient has been assessed with a concern for Malnutrition and has been determined to have a diagnosis/diagnoses of Severe protein-calorie malnutrition.    This patient is being managed with:   Diet Soft and Bite Sized-  DASH/TLC {Sodium & Cholesterol Restricted} (DASH)  Supplement Feeding Modality:  Oral  Nepro Cans or Servings Per Day:  1       Frequency:  Three Times a day  Entered: Jun 25 2022 11:29AM    
This patient has been assessed with a concern for Malnutrition and has been determined to have a diagnosis/diagnoses of Severe protein-calorie malnutrition.    This patient is being managed with:   Diet Soft and Bite Sized-  DASH/TLC {Sodium & Cholesterol Restricted} (DASH)  For patients receiving Renal Replacement - No Protein Restr No Conc K No Conc Phos Low  Sodium (RENAL)  Supplement Feeding Modality:  Oral  Nepro Cans or Servings Per Day:  1       Frequency:  Three Times a day  Entered: Jun 28 2022  9:04AM

## 2022-07-04 NOTE — PROGRESS NOTE ADULT - SUBJECTIVE AND OBJECTIVE BOX
[facility]  INFECTIOUS DISEASES AND INTERNAL MEDICINE at [place] and [place]  =======================================================                               Jeet Bryan MD#  Mango Oliver MD*                                     Andrew Kaba MD*    Radha Short MD*            Diplomates American Board of Internal Medicine & Infectious Diseases                # [place] Office - Appt - Tel  [phone] Fax [phone]                * [place] Office - Appt - Tel [phone] Fax [phone]                                  Hospital Consult line:  [phone]  =======================================================    CHRISTIANO ELIZABETH [id number]    Follow up: Fever   WITH LOW  GRADE TEM NON TOXIC    Allergies:  Plavix (Hives)  Toprol-XL (Rash)       REVIEW OF SYSTEMS:  Unable to obtain. Patient is confused       Physical Exam:  GEN: NAD  HEENT: normocephalic and atraumatic. EOMI. PERRL.    NECK: Supple.   LUNGS: CTA B/L.  HEART: RRR  ABDOMEN: Soft, NT, ND.  +BS.    : No CVA tenderness  EXTREMITIES: Without  edema.  MSK: No joint swelling  NEUROLOGIC: awake, randomly moving   PSYCHIATRIC: confused   SKIN: Left AKA site intact       Vitals:   Vital Signs Last 24 Hrs  T(C): 36.9 (04 Jul 2022 07:40), Max: 37.8 (03 Jul 2022 10:25)  T(F): 98.5 (04 Jul 2022 07:40), Max: 100.1 (03 Jul 2022 10:25)  HR: 85 (04 Jul 2022 07:40) (60 - 85)  BP: 146/58 (04 Jul 2022 07:40) (136/73 - 157/57)  BP(mean): --  RR: 18 (04 Jul 2022 07:40) (17 - 18)  SpO2: 97% (04 Jul 2022 07:40) (92% - 97%)    Current Antibiotics:    Other medications:  aspirin  chewable 81 milliGRAM(s) Oral daily  atorvastatin 80 milliGRAM(s) Oral at bedtime  chlorhexidine 2% Cloths 1 Application(s) Topical daily  epoetin juan-epbx (RETACRIT) Injectable 54970 Unit(s) IV Push <User Schedule>  fentaNYL   Patch  12 MICROgram(s)/Hr 1 Patch Transdermal every 72 hours  heparin   Injectable 5000 Unit(s) SubCutaneous every 12 hours  ibuprofen  Suspension. 400 milliGRAM(s) Oral <User Schedule>  isosorbide   mononitrate ER Tablet (IMDUR) 30 milliGRAM(s) Oral daily  lidocaine   4% Patch 1 Patch Transdermal every 24 hours  Nephro-pito 1 Tablet(s) Oral at bedtime  pantoprazole    Tablet 40 milliGRAM(s) Oral before breakfast  QUEtiapine 25 milliGRAM(s) Oral at bedtime  sevelamer carbonate 800 milliGRAM(s) Oral every 8 hours  tamsulosin 0.4 milliGRAM(s) Oral at bedtime  traZODone 50 milliGRAM(s) Oral at bedtime                                                10.3   11.45 )-----------( 437      ( 03 Jul 2022 08:56 )             34.2     RECENT CULTURES:  06-28 @ 21:27 .Blood Blood-Peripheral     No growth to date.    06-28 @ 21:25 .Blood Blood-Peripheral     No growth to date.    06-25 @ 16:34 .Blood Blood-Peripheral     No Growth Final    06-25 @ 16:28    [facility]    06-23 @ 11:16 .Blood Blood-Peripheral     No Growth Final    06-19 @ 18:21 .Body Fluid Pleural Fluid     No growth at 5 days  polymorphonuclear leukocytes seen  No organisms seen  by cytocentrifuge    06-19 @ 03:11 .Blood Blood-Peripheral     No Growth Final    06-19 @ 03:08 .Blood Blood-Peripheral     No Growth Final    06-18 @ 17:24    [facility]      WBC Count: 8.90 K/uL (07-02-22 @ 05:40)  WBC Count: 8.04 K/uL (07-01-22 @ 09:17)  WBC Count: 9.98 K/uL (06-30-22 @ 06:57)  WBC Count: 7.56 K/uL (06-29-22 @ 07:21)  WBC Count: 8.41 K/uL (06-28-22 @ 07:15)  WBC Count: 7.17 K/uL (06-27-22 @ 11:13)    Creatinine, Serum: 4.32 mg/dL (07-02-22 @ 05:40)  Creatinine, Serum: 3.42 mg/dL (07-01-22 @ 09:17)  Creatinine, Serum: 4.41 mg/dL (06-30-22 @ 06:57)  Creatinine, Serum: 3.37 mg/dL (06-29-22 @ 07:21)  Creatinine, Serum: 5.51 mg/dL (06-28-22 @ 07:15)    Procalcitonin, Serum: 1.41 ng/mL (07-01-22 @ 09:17)  Procalcitonin, Serum: 1.36 ng/mL (07-01-22 @ 07:21)  Procalcitonin, Serum: 0.79 ng/mL (06-26-22 @ 06:56)  Procalcitonin, Serum: 0.79 ng/mL (06-26-22 @ 06:56)  Procalcitonin, Serum: 1.37 ng/mL (06-19-22 @ 07:37)     COVID-19 PCR: NotDetec (06-30-22 @ 17:23)  SARS-CoV-2: NotDetec (06-25-22 @ 16:28)  COVID-19 PCR: NotDetec (06-25-22 @ 06:24)  SARS-CoV-2: NotDetec (06-18-22 @ 17:24)  COVID-19 PCR: NotDetec (06-13-22 @ 06:58)  COVID-19 PCR: NotDetec (06-03-22 @ 18:58)          < from: MR Lower Ext Non-joint No Cont, Left (07.01.22 @ 12:04) >  IMPRESSION:    Status post left above-the-knee amputation. Rounded collection of   intermediate to hyperintense T1 and hyperintense T2 signal abutting the   medullary cavity at the distal amputation margin and involving the distal   medullary cavity. Additionally, there is nonspecific periostitis about   the distal aspect of the femoral remnant. These findings are nonspecific   and may be related to postoperative change with small hematoma/seroma,   however superimposed infection is also in the differential.    --- End of Report ---    < end of copied text >        < from: CT Chest No Cont (06.29.22 @ 18:26) >    IMPRESSION:  Scattered ill-defined groundglass and nodular opacities are increased   from the prior study. These are indeterminate, possibly   infectious/inflammatory. Short-term follow-up CT needed for complete   evaluation.    --- End of Report ---    < end of copied text >        < from: CT Abdomen and Pelvis No Cont (06.26.22 @ 09:27) >    IMPRESSION:  1.  No evidence of intra-abdominal abscess.  2.  Stable RIGHT pleural effusion. Redemonstrated groundglass opacities   consistent with pneumonia.  3.  As seen on prior CT, multiple small nodules present; better seen on   prior CT. Prior CT recommended follow-up in 3 months to ensure resolution.  4.  Hepatic cirrhosis.  5.  Cholecystolithiasis without acute cholecystitis.    --- End of Report ---    < end of copied text >

## 2022-07-04 NOTE — PROGRESS NOTE ADULT - SUBJECTIVE AND OBJECTIVE BOX
[facility] DIVISION OF KIDNEY DISEASES AND HYPERTENSION -- HEMODIALYSIS NOTE  --------------------------------------------------------------------------------  Chief Complaint: ESRD/Ongoing hemodialysis requirement    24 hour events/subjective: As Per Discussion , No further HD,     PAST HISTORY  --------------------------------------------------------------------------------  No significant changes to PMH, PSH, FHx, SHx, unless otherwise noted    ALLERGIES & MEDICATIONS  --------------------------------------------------------------------------------  Allergies    Plavix (Hives)  Toprol-XL (Rash)    Intolerances    Standing Inpatient Medications  aspirin  chewable 81 milliGRAM(s) Oral daily  chlorhexidine 2% Cloths 1 Application(s) Topical daily  epoetin juan-epbx (RETACRIT) Injectable 31148 Unit(s) IV Push <User Schedule>  fentaNYL   Patch  12 MICROgram(s)/Hr. 1 Patch Transdermal every 48 hours  FIRST- Mouthwash  BLM 15 milliLiter(s) Swish and Spit three times a day  heparin   Injectable 5000 Unit(s) SubCutaneous every 12 hours  isosorbide   mononitrate ER Tablet (IMDUR) 30 milliGRAM(s) Oral daily  lidocaine   4% Patch 1 Patch Transdermal every 24 hours  Nephro-pito 1 Tablet(s) Oral at bedtime  nystatin    Suspension 824139 Unit(s) Swish and Swallow three times a day  pantoprazole    Tablet 40 milliGRAM(s) Oral before breakfast  QUEtiapine 25 milliGRAM(s) Oral at bedtime  sevelamer carbonate 800 milliGRAM(s) Oral every 8 hours  tamsulosin 0.4 milliGRAM(s) Oral at bedtime  traZODone 50 milliGRAM(s) Oral at bedtime    PRN Inpatient Medications  acetaminophen     Tablet .. 650 milliGRAM(s) Oral every 6 hours PRN  ondansetron Injectable 4 milliGRAM(s) IV Push every 8 hours PRN      REVIEW OF SYSTEMS : Pateint  is jasiel suh  dementia,   --------------------------------------------------------------------------------  All other systems were reviewed and are negative, except as noted.    VITALS/PHYSICAL EXAM  --------------------------------------------------------------------------------  T(C): 36.9 (07-04-22 @ 07:40), Max: 37.8 (07-03-22 @ 10:25)  HR: 85 (07-04-22 @ 07:40) (60 - 85)  BP: 146/58 (07-04-22 @ 07:40) (136/73 - 157/57)  RR: 18 (07-04-22 @ 07:40) (17 - 18)  SpO2: 97% (07-04-22 @ 07:40) (92% - 97%)    Wt(kg): -- MADY    07-03-22 @ 07:01  -  07-04-22 @ 07:00  --------------------------------------------------------  IN: 120 mL / OUT: 0 mL / NET: 120 mL    Physical Exam:  	Gen: NAD, Frail, Debilitated, Pale, Cachectic,   	HEENT: PERRL, supple neck, clear oropharynx, Edentulous,   	Pulm: CTA B/L  	CV: RRR, S1S2; no rub  	Back: No spinal or CVA tenderness; + sacral edema  	Abd: +BS, soft, nontender/nondistended  	: No suprapubic tenderness  	UE: Warm,   	LE: Warm, S/P Amputee,   	Neuro: No focal deficits, Confined to Bed & Chair,   	Psych: Confused & Disoriented,   	Skin: Warm, without rash,   	Vascular access: R - IJ TDC,     LABS/STUDIES  --------------------------------------------------------------------------------              10.3   11.45 >-----------<  437      [07-03-22 @ 08:56]              34.2     Iron 37, TIBC 184, %sat 20      [06-26-22 @ 06:56]  Ferritin 2637      [06-26-22 @ 06:56]  HbA1c 4.9      [08-09-18 @ 05:54]  TSH 5.55      [06-03-22 @ 15:44]    HBsAb <3.0      [06-14-22 @ 03:38]  HBsAg Nonreact      [06-14-22 @ 03:38]  HBcAb Nonreact      [06-14-22 @ 03:38]  HCV 0.18, Nonreact      [06-14-22 @ 03:38]

## 2022-07-04 NOTE — PROGRESS NOTE ADULT - ASSESSMENT
88 year old male with PMH of ESRD on HD (TTS), Hx GIB 2/2 AVM, CAD, HTN, HLD, PPM, CHFpEF, PAD s/p L Fem Bypass with cryovein c/b acute graft rethrombosis s/p thrombectomy now s/p L AKA discharged 2 days ago from [facility] Service after SICU admission for Hemorrhagic Shock s/p L AKA on Augmentin for "FUO" discharged two days then readmitted for Recurrent Fevers and AMS in the setting of a right pleural effusion.     After extensive discussions with yesterday and today with patient's wife and daughter the decision was made for not pursuing any further aggressive treatments  and discharge home with home care and then transitioning to home hospice with [facility] docs referral     AFTER hd TOMORROW POSSIBLE dc 7/5 - family really wants and needs aids at home with hospice     #Fevers- continuing ; unclear etiology; rule out OM of left stump vs drug fever-  -pleural fluid culture negative  -CT chest reporting ground glass opacities consistent with PNA; although patient continued to spike despite broad spectrum abx  -UA negative  -CT abd/pelvis negative for acute pathology      #Acute Hypoxic Respiratory Failure due to Pleural Effusions  -s/p right chest tube which was removed on 6/23- removed about 1500cc  -pleural fluid culture and cytology negative    #PAD s/p L Fem Bypass with cryovein c/b acute graft rethrombosis s/p thrombectomy now s/p L AKA  stump site clean without acute concerns for infection although CT with concerns for OM- culture fluid negative for growth  Continue aspirin   Analgesia as needed  Vascular Surgery    #Hx LLE DVT-Eliquis -Held 2/2 to hemorrhagic shock    #ESRD on HD (TTS)  HD tentatively scheduled for 7/4; UF goal per renal  Midodrine to assist with UF  Continue torsemide, Nephrovite daily, Sevelamer TID with meals     #Chronic Diastolic Heart Failure   euvolemic; UF goal per renal  Continue Torsemide  Continue Imdur    #Intermittent Delirium likely in the setting of fevers and hospital confinement   increasingly getting more forgetful in the past few months likely due to cognitive impairment at baseline  Continue Melatonin 3mg, Trazodone 50 mg and Seroquel 25mg    # CAD  -continue aspirin     #HLD    #HTN  -BP stable on midodrine; wean as tolerated  -continue torsemide and Imdur    #Constipation- had large BM with blood yesterday    #BPH  -continue Flomax  #Anemia of chronic kidney disease  -Hb stable  #Chronic Pain Syndrome   fentanyl and lidocaine patches    DVT ppx - Heparin SC

## 2022-07-04 NOTE — PROGRESS NOTE ADULT - ASSESSMENT
8 y.o. M with PMHX ESRD/HD on Mondays and Fridays ([place]), Hx GIB 2/2 AVM, CAD, HTN, HLD, PPM, CHFpEF, PAD s/p L Fem Bypass with cryovein c/b acute graft rethrombosis s/p thrombectomy now s/p L AKA discharged 2 days ago from Vascular Service after SICU admission for Hemorrhagic Shock s/p L AKA on Augmentin for "FUO". Patient brought back to [facility] ER for recurrent fevers despite abx with Augmentin and delirium. CXR from prior hospitalization with blunted costophrenic angles.   COVID/RVP Negative . Pt evaluated by Vascular Surgery for L AKA in ER. CT Surgery consulted for effusion possible parapneumonic effusion. ID consulted. Discussed plan of care with daughter and grandson at bedside. DNR/DNI confirmed but want all other medical and surgical interventions.     recently discharged 6/16/22 per daughter, had low grade fever at that time.   at home progressively decline.  EMS was called, evaluated the patient, and brought to hospital.     fevers noted here.   CT scan showed:  1.  Bilateral pleural effusions (right greater than left).  2.  Complete passive atelectasis of the right lower lobe.  3.  Bilateral linear atelectasis.  4.  Groundglass opacities and septal thickening are of uncertain etiology.  5.  Bilateral pulmonary nodules, some which have a branching morphology   are uncertain etiology. A follow-up is recommended in 4-6 weeks to   evaluate for resolution/change        Fevers   fevers  weakness  pleural effusions  ESRD on HD    - Stump collection cultures  NO GROWTH   - Blood cultures 6/19, 6/23, 6/25, 6/28 all no growth   - Pleural fluid culture 6/19 no growth   - RVP/COVID 19 PCR negative on multiple occasions   - Procalcitonin level level is not elevated in setting of ESRD which is indicative of no infection   - CT Chest 6/29 reporting infectious vs inflammatory opacities   - CT A/P 6/26 reporting pleural effusion   - US Duples RLE with no DVT on 6/27  - Continue to monitor off antibiotics  - US duplex LLE 7/1 with no DVT and US Duplex RLE 6/27 with no DVT  - MRI LLE 7/1 reporting collection? infectious vs hematoma  - s/p IR drainage 7/1  IT APPEARS PLAN IS HOME HOSPICE Tomorrow  WILL FOLLOW UP AS NEEDED PLEASE CALL IF QUESTIONS

## 2022-07-04 NOTE — PROGRESS NOTE ADULT - ASSESSMENT
Patient was seen and evaluated on dialysis.   Patient is tolerating the procedure well.   T(C): 36.9 (07-04-22 @ 07:40), Max: 37.8 (07-03-22 @ 10:25)  HR: 85 (07-04-22 @ 07:40) (60 - 85)  BP: 146/58 (07-04-22 @ 07:40) (136/73 - 157/57)  Discontinue  further  dialysis:   Dialyzer:  Revaclear 300        QB: 400 ml.,        QD: 500ml.,  Goal UF 0.5 L  over _3_ Hours     Pt is seen and examined on dialysis. No symptoms. Hemodynamics stable. Tolerating dialysis and ultrafiltration.  Pre Laboratory values personally reviewed by me.  Dialysis adjusted appropriately based on current values.  Will continue the current medical management.  Next hemodialysis as scheduled.  Discussed with nursing, primary care team.     Dr. Elise D/W Daughter Moe Recio HD RN S/W Daughter this AM,     Cleared for discharge to Home Hospice,     TY

## 2022-07-05 NOTE — ED ADULT TRIAGE NOTE - NS ED NURSE DIRECT TO ROOM YN
Neurology Consult Note  [facility]       [2022]                                                                                       Admission Date: 2022  Hospital Day: 5  Code Status: Full Code    Patient: Carlton Maynard      : 1952  MRN:  [id number]       CC:      Chief Complaint   Patient presents with     Loss of Consciousness       Consult Request:  Referring Provider:  No admitting provider for patient encounter.    Indication for Consultation:   Reason for Consult syncope, cardiac cause not identified     Primary Care Provider:  Clinic, [facility] Lavina MD        HPI:  Carlton Maynard is a 69 [id number]^USHA^ANISHA^BALJIT  [facility] [address] [place] [address]  Name: YUDITH MANN MRN: [id number] : 1952 Study Date: 06/10/SHERI BRICEÑO MD   SYSTEM ID:  [id number]    TONYA BULLOCK MD   SYSTEM ID:  [id number]    MARCUS SMALL MD   SYSTEM ID:  [id number]      Inscription House Health Center of Neurology, Ltd.

## 2022-07-05 NOTE — PROGRESS NOTE ADULT - SUBJECTIVE AND OBJECTIVE BOX
[facility]  INFECTIOUS DISEASES AND INTERNAL MEDICINE at [place] [facility] [place]  =======================================================                               Jeet Bryan MD#  Mango Oliver MD*                                     Andrew Kaba MD*    Radha Short MD*            Diplomates American Board of Internal Medicine & Infectious Diseases                # [place] Office - Appt - Tel  [phone] Fax [phone]                * [place] Office - Appt - Tel [phone] Fax [phone]                                  Hospital Consult line:  [phone]  =======================================================    CHRISTIANO ELIZABETH [id number]    Follow up: Fever   WITH LOW  GRADE TEMps NON TOXIC    Allergies:  Plavix (Hives)  Toprol-XL (Rash)       REVIEW OF SYSTEMS:  Unable to obtain. Patient is confused       Physical Exam:  GEN: NAD AWAKE   HEENT: normocephalic and atraumatic. EOMI. PERRL.    NECK: Supple.   LUNGS: CTA B/L.  HEART: RRR  ABDOMEN: Soft, NT, ND.  +BS.    : No CVA tenderness  EXTREMITIES: Without  edema.  MSK: No joint swelling  NEUROLOGIC: awake,ALERT      SKIN: Left AKA site intact       Vitals:    Vital Signs Last 24 Hrs  T(C): 36.7 (05 Jul 2022 10:39), Max: 38.5 (04 Jul 2022 18:19)  T(F): 98 (05 Jul 2022 10:39), Max: 101.3 (04 Jul 2022 18:19)  HR: 108 (05 Jul 2022 10:39) (62 - 108)  BP: 132/54 (05 Jul 2022 10:39) (126/50 - 149/63)  BP(mean): --  RR: 18 (05 Jul 2022 10:39) (18 - 18)  SpO2: 98% (05 Jul 2022 10:39) (95% - 99%)    Other medications:  aspirin  chewable 81 milliGRAM(s) Oral daily  atorvastatin 80 milliGRAM(s) Oral at bedtime  chlorhexidine 2% Cloths 1 Application(s) Topical daily  epoetin juan-epbx (RETACRIT) Injectable 71837 Unit(s) IV Push <User Schedule>  fentaNYL   Patch  12 MICROgram(s)/Hr 1 Patch Transdermal every 72 hours  heparin   Injectable 5000 Unit(s) SubCutaneous every 12 hours  ibuprofen  Suspension. 400 milliGRAM(s) Oral <User Schedule>  isosorbide   mononitrate ER Tablet (IMDUR) 30 milliGRAM(s) Oral daily  lidocaine   4% Patch 1 Patch Transdermal every 24 hours  Nephro-pito 1 Tablet(s) Oral at bedtime  pantoprazole    Tablet 40 milliGRAM(s) Oral before breakfast  QUEtiapine 25 milliGRAM(s) Oral at bedtime  sevelamer carbonate 800 milliGRAM(s) Oral every 8 hours  tamsulosin 0.4 milliGRAM(s) Oral at bedtime  traZODone 50 milliGRAM(s) Oral at bedtime                                        RECENT CULTURES:  06-28 @ 21:27 .Blood Blood-Peripheral     No growth to date.    06-28 @ 21:25 .Blood Blood-Peripheral     No growth to date.    06-25 @ 16:34 .Blood Blood-Peripheral     No Growth Final    06-25 @ 16:28    [facility]    06-23 @ 11:16 .Blood Blood-Peripheral     No Growth Final    06-19 @ 18:21 .Body Fluid Pleural Fluid     No growth at 5 days  polymorphonuclear leukocytes seen  No organisms seen  by cytocentrifuge    06-19 @ 03:11 .Blood Blood-Peripheral     No Growth Final    06-19 @ 03:08 .Blood Blood-Peripheral     No Growth Final    06-18 @ 17:24    [facility]      WBC Count: 8.90 K/uL (07-02-22 @ 05:40)  WBC Count: 8.04 K/uL (07-01-22 @ 09:17)  WBC Count: 9.98 K/uL (06-30-22 @ 06:57)  WBC Count: 7.56 K/uL (06-29-22 @ 07:21)  WBC Count: 8.41 K/uL (06-28-22 @ 07:15)  WBC Count: 7.17 K/uL (06-27-22 @ 11:13)    Creatinine, Serum: 4.32 mg/dL (07-02-22 @ 05:40)  Creatinine, Serum: 3.42 mg/dL (07-01-22 @ 09:17)  Creatinine, Serum: 4.41 mg/dL (06-30-22 @ 06:57)  Creatinine, Serum: 3.37 mg/dL (06-29-22 @ 07:21)  Creatinine, Serum: 5.51 mg/dL (06-28-22 @ 07:15)    Procalcitonin, Serum: 1.41 ng/mL (07-01-22 @ 09:17)  Procalcitonin, Serum: 1.36 ng/mL (07-01-22 @ 07:21)  Procalcitonin, Serum: 0.79 ng/mL (06-26-22 @ 06:56)  Procalcitonin, Serum: 0.79 ng/mL (06-26-22 @ 06:56)  Procalcitonin, Serum: 1.37 ng/mL (06-19-22 @ 07:37)     COVID-19 PCR: NotDetec (06-30-22 @ 17:23)  SARS-CoV-2: NotDetec (06-25-22 @ 16:28)  COVID-19 PCR: NotDetec (06-25-22 @ 06:24)  SARS-CoV-2: NotDetec (06-18-22 @ 17:24)  COVID-19 PCR: NotDetec (06-13-22 @ 06:58)  COVID-19 PCR: NotDetec (06-03-22 @ 18:58)          < from: MR Lower Ext Non-joint No Cont, Left (07.01.22 @ 12:04) >  IMPRESSION:    Status post left above-the-knee amputation. Rounded collection of   intermediate to hyperintense T1 and hyperintense T2 signal abutting the   medullary cavity at the distal amputation margin and involving the distal   medullary cavity. Additionally, there is nonspecific periostitis about   the distal aspect of the femoral remnant. These findings are nonspecific   and may be related to postoperative change with small hematoma/seroma,   however superimposed infection is also in the differential.    --- End of Report ---    < end of copied text >        < from: CT Chest No Cont (06.29.22 @ 18:26) >    IMPRESSION:  Scattered ill-defined groundglass and nodular opacities are increased   from the prior study. These are indeterminate, possibly   infectious/inflammatory. Short-term follow-up CT needed for complete   evaluation.    --- End of Report ---    < end of copied text >        < from: CT Abdomen and Pelvis No Cont (06.26.22 @ 09:27) >    IMPRESSION:  1.  No evidence of intra-abdominal abscess.  2.  Stable RIGHT pleural effusion. Redemonstrated groundglass opacities   consistent with pneumonia.  3.  As seen on prior CT, multiple small nodules present; better seen on   prior CT. Prior CT recommended follow-up in 3 months to ensure resolution.  4.  Hepatic cirrhosis.  5.  Cholecystolithiasis without acute cholecystitis.    --- End of Report ---    < end of copied text >

## 2022-07-05 NOTE — DISCHARGE NOTE PROVIDER - NSDCFUADDAPPT_GEN_ALL_CORE_FT
AI patient.  You have a prescheduled appointment with your PCP, Dr. Bryan, on 7/6/22 at 4:45pm. Please call the office ([phone]) if you need to reschedule.

## 2022-07-05 NOTE — PROGRESS NOTE ADULT - ASSESSMENT
8 y.o. M with PMHX ESRD/HD on Mondays and Fridays ([place]), Hx GIB 2/2 AVM, CAD, HTN, HLD, PPM, CHFpEF, PAD s/p L Fem Bypass with cryovein c/b acute graft rethrombosis s/p thrombectomy now s/p L AKA discharged 2 days ago from Vascular Service after SICU admission for Hemorrhagic Shock s/p L AKA on Augmentin for "FUO". Patient brought back to [facility] ER for recurrent fevers despite abx with Augmentin and delirium. CXR from prior hospitalization with blunted costophrenic angles.   COVID/RVP Negative . Pt evaluated by Vascular Surgery for L AKA in ER. CT Surgery consulted for effusion possible parapneumonic effusion. ID consulted. Discussed plan of care with daughter and grandson at bedside. DNR/DNI confirmed but want all other medical and surgical interventions.     recently discharged 6/16/22 per daughter, had low grade fever at that time.   at home progressively decline.  EMS was called, evaluated the patient, and brought to hospital.     fevers noted here.   CT scan showed:  1.  Bilateral pleural effusions (right greater than left).  2.  Complete passive atelectasis of the right lower lobe.  3.  Bilateral linear atelectasis.  4.  Groundglass opacities and septal thickening are of uncertain etiology.  5.  Bilateral pulmonary nodules, some which have a branching morphology   are uncertain etiology. A follow-up is recommended in 4-6 weeks to   evaluate for resolution/change        Fevers   fevers  weakness  pleural effusions  ESRD on HD    - Stump collection cultures  NO GROWTH   - Blood cultures 6/19, 6/23, 6/25, 6/28 all no growth   - Pleural fluid culture 6/19 no growth   - RVP/COVID 19 PCR negative on multiple occasions   - Procalcitonin level level is not elevated in setting of ESRD which is indicative of no infection   - CT Chest 6/29 reporting infectious vs inflammatory opacities   - CT A/P 6/26 reporting pleural effusion   - US Duples RLE with no DVT on 6/27  - Continue to monitor off antibiotics  - US duplex LLE 7/1 with no DVT and US Duplex RLE 6/27 with no DVT  - MRI LLE 7/1 reporting collection? infectious vs hematoma  - s/p IR drainage 7/1  SPOKE TO DAUGHTER ADRI TODAY    STATES HER MOHTER WANTS HIM HOME   IT APPEARS PLAN IS HOME HOSPICE    WILL FOLLOW UP AS NEEDED PLEASE CALL IF QUESTIONS

## 2022-07-05 NOTE — DISCHARGE NOTE PROVIDER - ATTENDING DISCHARGE PHYSICAL EXAMINATION:
GENERAL: NAD, frail elderly  HEAD:  Atraumatic, Normocephalic  EYES: EOMI, PERRL, conjunctiva and sclera clear  ENMT: No tonsillar erythema, exudates, or enlargement; dry mucous membranes,  NECK: Supple, Normal thyroid  NERVOUS SYSTEM:  sleepy poor concentration; barely moves B/L upper and lower extremities  CHEST/LUNG: Clear to percussion bilaterally; No rales, rhonchi, wheezing, or rubs  HEART: Regular rate and rhythm; No murmurs, rubs, or gallops  ABDOMEN: Soft, Nontender, Nondistended; Bowel sounds present  EXTREMITIES stump staples- Vascular will remove

## 2022-07-05 NOTE — GOALS OF CARE CONVERSATION - ADVANCED CARE PLANNING - CONVERSATION DETAILS
Care manager note: Chart reviewed.  Case discussed with interdisciplinary team as Hospitalist requesting home hospice referral following goals of care discussion with family and decision to no longer pursue HD.  Spoke with Hospitalist about the case and reached out to daughter/emergency contact Vanessa Douglas (c:[phone]).  Daughter confirmed discussion held about stopping HD as well as hospice services.  Hospice philosophy and home hospice services discussed at length.  Daughter noted [facility] via [facility].  Daughter concerned about losing and/or delayed HHA resumption, noting that if hospice cannot offer the same hours that [facility] was offering then they would need to reconsider alternate option.  Discussed that hospice would need to make that determination and agreeable to referral being sent to [facility].  Referral sent and [facility] on-call nurse Nicole noted that patient was approved, anticipate having intake RN available Tues/Weds but that HHA coverage/availability would need to be determined tomorrow due to the holiday.  Daughter also requesting to speak with nephrology MD further about stopping HD so Dr. Avendano notified and agreeable to follow-up further today with family.      Per MD anticipating DC home as early as tomorrow.  DC summary not yet available but most recent [facility] (ph: [phone], f:[phone], Attn: ANH REBOLLEDO) requesting reinstatement of services as early as tomorrow 7/5.  Will continue to follow.
GOC discussed with family at bedside including daughter and grandson. DNR/DNI confirmed. Family wants all other surgical and medical interventions. Agreed to consult Palliative Care for ongoing continuity. Discussed treatment plan for multiple problems including L AKA, Recurrent Fevers, Efffusion, etc and family agreeable to plan.
I had very extensive conversations with patient's daughters and wife about his very guarded and poor prognosis nd his limited time - the family has requested plan for discharge home - hospice transition -after HD tomorrow - DC plan for 7/5/22
spoke to patient's daughter and wife (on phone)- family wants the catheter removed- discharge plan is fo home with hospice - aids being arranged - will discharge when home hospice arranged later today

## 2022-07-05 NOTE — PROGRESS NOTE ADULT - PROVIDER SPECIALTY LIST ADULT
CT Surgery
Hospitalist
Hospitalist
Infectious Disease
Electrophysiology
Hospitalist
Infectious Disease
Infectious Disease
Nephrology
Palliative Care
Thoracic Surgery
Vascular Surgery
Cardiology
Gastroenterology
Hospitalist
Hospitalist
Infectious Disease
Nephrology
Thoracic Surgery
Hospitalist
Infectious Disease
Nephrology
Palliative Care
Hospitalist
Palliative Care
Thoracic Surgery

## 2022-07-05 NOTE — DISCHARGE NOTE PROVIDER - HOSPITAL COURSE
88M with PMHX ESRD/HD, Hx GIB 2/2 AVM, CAD, HTN, HLD, PPM, CHFpEF, PAD s/p L Fem Bypass with cryovein c/b acute graft rethrombosis s/p thrombectomy now s/p L AKA discharged 2 days ago from Vascular Service after SICU admission for Hemorrhagic Shock, s/p L AKA on Augmentin for "FUO".  ROS +Delirium +Insomnia (no sleep x48 hours) +Fevers.  recurrent fevers despite Abx Augmentin and with delirium.   CT Chest WO done shows moderate-large pleural effusion, Evaluated by Vascular Surgery for L AKA   CT Surgery consulted for effusion possible parapneumonic effusion. ID consulted.   DNR/DNI confirmed Cardiology Consult for PPM interrogation     After extensive discussions with patient's wife and daughter over the  the long weekend the decision was made for not pursuing any further aggressive treatments and   plan is for discharge home with home hospice today    AFTER hd TOMORROW POSSIBLE dc 7/5 - family really wants and needs aids at home with hospice     #Fevers- continuing ; unclear etiology  -pleural fluid culture negative  -CT chest reporting ground glass opacities consistent with PNA  patient continued to spike despite broad spectrum abx- no more ABx per ID   -UA negative  -CT abd/pelvis negative for acute pathology    Acute Hypoxic Respiratory Failure due to Pleural Effusions  -s/p right chest tube which was removed on 6/23- removed about 1500cc  -pleural fluid culture and cytology negative  #PAD s/p L Fem Bypass with cryovein c/b acute graft rethrombosis s/p thrombectomy now s/p L AKA  stump site clean without acute concerns for infection although CT with concerns for OM- culture fluid negative for growth  Hx LLE DVT-Eliquis -Held 2/2 to hemorrhagic shock  #ESRD   #Intermittent Delirium likely in the setting of fevers and hospital confinement   increasingly getting more forgetful in the past few months likely due to cognitive impairment at baseline  Continue Melatonin 3mg, Trazodone 50 mg and Seroquel 25mg  # CAD/#HTN/#Chronic Diastolic Heart Failure  -continue torsemide alternate day and Imdur  -continue aspirin   #HLD- no need for further statin- risks out-weigh any benefit  Constipation- had large BM with blood yesterday  #BPH  -continue Flomax  #Anemia of chronic kidney disease  -#Chronic Pain Syndrome   fentanyl and lidocaine patches     88M with PMHX ESRD/HD, Hx GIB 2/2 AVM, CAD, HTN, HLD, PPM, CHFpEF, PAD s/p L Fem Bypass with cryovein c/b acute graft rethrombosis s/p thrombectomy now s/p L AKA discharged 2 days ago from Vascular Service after SICU admission for Hemorrhagic Shock, s/p L AKA on Augmentin for "FUO".  ROS +Delirium +Insomnia (no sleep x48 hours) +Fevers.  recurrent fevers despite Abx Augmentin and with delirium.   CT Chest WO done shows moderate-large pleural effusion, Evaluated by Vascular Surgery for L AKA   CT Surgery consulted for effusion possible parapneumonic effusion. ID consulted.   DNR/DNI confirmed Cardiology Consult for PPM interrogation     After extensive discussions with patient's wife and daughter over the long weekend the decision was made for not pursuing any further aggressive treatments and   plan is for discharge home with home hospice today  DC HD catheter today [prior to discharge    #Fevers- continuing ; unclear etiology  -pleural fluid culture negative  -CT chest reporting ground glass opacities consistent with PNA  patient continued to spike despite broad spectrum abx- no more ABx per ID   -UA negative  -CT abd/pelvis negative for acute pathology    Acute Hypoxic Respiratory Failure due to Pleural Effusions  -s/p right chest tube which was removed on 6/23- removed about 1500cc  -pleural fluid culture and cytology negative  #PAD s/p L Fem Bypass with cryovein c/b acute graft rethrombosis s/p thrombectomy now s/p L AKA  stump site clean without acute concerns for infection although CT with concerns for OM- culture fluid negative for growth  Hx LLE DVT-Eliquis -Held 2/2 to hemorrhagic shock  #ESRD   #Intermittent Delirium likely in the setting of fevers and hospital confinement   increasingly getting more forgetful in the past few months likely due to cognitive impairment at baseline  Continue Melatonin 3mg, Trazodone 50 mg and Seroquel 25mg  # CAD/#HTN/#Chronic Diastolic Heart Failure  -continue torsemide alternate day and Imdur  -continue aspirin   #HLD- no need for further statin- risks out-weigh any benefit  Constipation- had large BM with blood yesterday  #BPH  -continue Flomax  #Anemia of chronic kidney disease  -#Chronic Pain Syndrome   fentanyl and lidocaine patches

## 2022-07-05 NOTE — GOALS OF CARE CONVERSATION - ADVANCED CARE PLANNING - CONVERSATION/DISCUSSION
Diagnosis/Prognosis/MOLST Discussed/Treatment Options
Hospice Referral

## 2022-07-05 NOTE — DISCHARGE NOTE PROVIDER - NSDCMRMEDTOKEN_GEN_ALL_CORE_FT
acetaminophen 325 mg oral tablet: 3 tab(s) orally every 6 hours, As needed, Mild Pain (1 - 3)  amoxicillin-clavulanate 250 mg-125 mg oral tablet: 1 tab(s) orally 2 times a day   aspirin 81 mg oral tablet, chewable: 1 tab(s) orally once a day  atorvastatin 80 mg oral tablet: 1 tab(s) orally once a day (at bedtime)  cadexomer iodine 0.9% topical gel: 1 application topically 2 times a day  fentaNYL 12 mcg/hr transdermal film, extended release: 1 patch transdermal every 72 hours MDD:1 patch every 3 days  hydrocortisone 25 mg rectal suppository: 1 suppository(ies) rectal every 12 hours  isosorbide mononitrate 30 mg oral tablet, extended release: 1 tab(s) orally once a day  Melatonin 3 mg oral tablet: 1 tab(s) orally once a day (at bedtime)  Nephro-Thomas oral tablet: 1 tab(s) orally once a day  NIFEdipine 90 mg oral tablet, extended release: 1 tab(s) orally once a day  pantoprazole 40 mg oral delayed release tablet: 1 tab(s) orally 2 times a day  polyethylene glycol 3350 oral powder for reconstitution: 17 gram(s) orally every 12 hours  QUEtiapine 25 mg oral tablet: 1 tab(s) orally once a day (at bedtime)  senna oral tablet: 2 tab(s) orally once a day (at bedtime)  sevelamer hydrochloride 800 mg oral tablet: 1 tab(s) orally 3 times a day  Super B Complex: 1 tab(s) orally once a day  tamsulosin 0.4 mg oral capsule: 1 cap(s) orally once a day (at bedtime)  torsemide 20 mg oral tablet: 1 tab(s) orally on non HD days  traZODone 50 mg oral tablet: 1 tab(s) orally once a day (at bedtime)   acetaminophen 325 mg oral tablet: 2 tab(s) orally every 6 hours, As needed, Temp greater or equal to 38C (100.4F), Mild Pain (1 - 3)  diphenhydramine/lidocaine/aluminum hydroxide/magnesium hydroxide/simethicone mucous membrane suspension: 1 application mucous membrane 2 times a day   fentaNYL 12 mcg/hr transdermal film, extended release: 1 patch transdermal every 48 hours MDD:0.5 patch  lidocaine 4% topical film: Apply topically to affected area once a day   Melatonin 3 mg oral tablet: 1 tab(s) orally once a day (at bedtime)  Nephro-Thomas oral tablet: 1 tab(s) orally once a day  nystatin 100,000 units/mL oral suspension: 5 milliliter(s) orally 3 times a day  pantoprazole 40 mg oral delayed release tablet: 1 tab(s) orally 2 times a day  QUEtiapine 25 mg oral tablet: 1 tab(s) orally once a day (at bedtime)  senna oral tablet: 2 tab(s) orally once a day (at bedtime)  sevelamer hydrochloride 800 mg oral tablet: 1 tab(s) orally 3 times a day  tamsulosin 0.4 mg oral capsule: 1 cap(s) orally once a day (at bedtime)  traZODone 50 mg oral tablet: 1 tab(s) orally once a day (at bedtime) MDD:1

## 2022-07-05 NOTE — PROGRESS NOTE ADULT - SUBJECTIVE AND OBJECTIVE BOX
Vascular surgery brief note    Right IJ permacath removed as requested   10cc lidocaine administered  performed via sterile manor    - Tip sent for culture  - pressure held at IJ insertion site for 10 minutes, no hemorrhage/hematoma   - dressing may stay in place for 2 days, change if becomes saturated

## 2022-07-05 NOTE — PROGRESS NOTE ADULT - REASON FOR ADMISSION
Recurrent Fevers

## 2022-07-05 NOTE — DISCHARGE NOTE PROVIDER - NSDCFUSCHEDAPPT_GEN_ALL_CORE_FT
[facility]  [facility] [address]  Scheduled Appointment: 07/20/2022

## 2022-07-05 NOTE — DISCHARGE NOTE PROVIDER - CARE PROVIDER_API CALL
Jeet Brayn)  Infectious Disease; Internal Medicine  [address] [place] [address]  Phone: [phone]  Fax: [phone]  Follow Up Time:

## 2022-07-07 NOTE — PROGRESS NOTE ADULT - SUBJECTIVE AND OBJECTIVE BOX
[facility] DIVISION OF KIDNEY DISEASES AND HYPERTENSION -- INITIAL CONSULT NOTE  --------------------------------------------------------------------------------  HPI:  88M with PMHX ESRD/HD ([place]), Hx GIB 2/2 AVM, CAD, HTN, HLD, PPM, CHFpEF, PAD s/p L Fem Bypass with cryovein c/b acute graft rethrombosis s/p thrombectomy now s/p L AKA discharged 2 days ago from Vascular Service after SICU admission for Hemorrhagic Shock s/p L AKA on Augmentin for "FUO". Patient brought back to [facility] ER for recurrent fevers despite abx with Augmentin and delirium. CXR from prior hospitalization with blunted costophrenic angles. Repeat CXR unchanged. COVID/RVP Negative. CT Chest WO done shows moderate-large pleural effusion on my review awaiting official read by Radiology. Pt evaluated by Vascular Surgery for L AKA in ER. CT Surgery consulted for effusion possible parapneumonic effusion. ID consulted. Discussed plan of care with daughter and grandson at bedside. DNR/DNI confirmed but want all other medical and surgical interventions. Agreed to reconsult Palliative Care. Family also requesting Cardiology Consult for PPM interrogation while inpt. ROS +Delirium +Insomnia (no sleep x48 hours) +Fevers. ROS negative unless mentioned.   Pt seen/examined; due for HD in AM; daughter bedside; plan for pigtail catheter for effusion;    No complaints    PAST HISTORY  --------------------------------------------------------------------------------  PAST MEDICAL & SURGICAL HISTORY:  DM (diabetes mellitus)  - resolved      AV block, 1st degree      Arrhythmia      CAD (coronary artery disease)      HTN (hypertension)      VT (ventricular tachycardia)      RICHARDS (dyspnea on exertion)      Anemia      Risk factors for obstructive sleep apnea      ESRD on dialysis  HD on Mondays and Fridays      Aortic stenosis  - s/p valve replacement      GI bleeding  due to ulcerated polyps and colonic AVMs      H/O circumcision  at  age  65      H/O left knee surgery      H/O angioplasty  2013,  no  intervention      A-V fistula  left arm 5/2017      H/O carotid endarterectomy  Right      S/P TAVR (transcatheter aortic valve replacement)      History of loop recorder        FAMILY HISTORY:  Family history of cancer (Grandparent)    Family history of lung cancer (Grandparent)    Family history of premature CAD    FH: type 2 diabetes      PAST SOCIAL HISTORY:    ALLERGIES & MEDICATIONS  --------------------------------------------------------------------------------  Allergies    Plavix (Hives)  Toprol-XL (Rash)    Intolerances      Standing Inpatient Medications  aspirin  chewable 81 milliGRAM(s) Oral daily  atorvastatin 80 milliGRAM(s) Oral at bedtime  fentaNYL   Patch  12 MICROgram(s)/Hr 1 Patch Transdermal every 72 hours  heparin   Injectable 5000 Unit(s) SubCutaneous every 12 hours  isosorbide   mononitrate ER Tablet (IMDUR) 30 milliGRAM(s) Oral daily  lidocaine 1% (Preservative-free) Injectable 20 milliLiter(s) Local Injection once  melatonin 3 milliGRAM(s) Oral at bedtime  multivitamin 1 Tablet(s) Oral daily  Nephro-pito 1 Tablet(s) Oral at bedtime  pantoprazole    Tablet 40 milliGRAM(s) Oral before breakfast  piperacillin/tazobactam IVPB.. 3.375 Gram(s) IV Intermittent every 12 hours  QUEtiapine 25 milliGRAM(s) Oral at bedtime  sevelamer carbonate 800 milliGRAM(s) Oral every 8 hours  tamsulosin 0.4 milliGRAM(s) Oral at bedtime  torsemide 20 milliGRAM(s) Oral daily  traZODone 50 milliGRAM(s) Oral at bedtime    PRN Inpatient Medications  acetaminophen     Tablet .. 650 milliGRAM(s) Oral every 6 hours PRN  aluminum hydroxide/magnesium hydroxide/simethicone Suspension 30 milliLiter(s) Oral every 4 hours PRN  melatonin 3 milliGRAM(s) Oral at bedtime PRN  ondansetron Injectable 4 milliGRAM(s) IV Push every 8 hours PRN      REVIEW OF SYSTEMS  --------------------------------------------------------------------------------  Limited due to lethargy    VITALS/PHYSICAL EXAM  --------------------------------------------------------------------------------    Vital Signs Last 24 Hrs  T(C): 36.7 (23 Jun 2022 10:06), Max: 38.8 (23 Jun 2022 04:21)  T(F): 98 (23 Jun 2022 10:06), Max: 101.9 (23 Jun 2022 04:21)  HR: 58 (23 Jun 2022 10:06) (56 - 69)  BP: 133/54 (23 Jun 2022 10:06) (127/51 - 161/64)  BP(mean): --  RR: 18 (23 Jun 2022 10:06) (18 - 18)  SpO2: 98% (23 Jun 2022 10:06) (97% - 100%)        Physical Exam:  	Gen: NAD   	HEENT: supple neck  	Pulm: dec BS  	CV: RRR, S1S2; no rub  	Abd: +BS, soft, nontender/nondistended  	: No suprapubic tenderness  	UE: Warm, no clubbing, no edema; no asterixis  	LE: +LLE AKA C/D/I Bandage in place +RLE WNL  	Neuro: lethargic  	Skin: Warm, without rashes      LABS/STUDIES                        8.8    6.31  )-----------( 295      ( 23 Jun 2022 07:01 )             28.8     06-23    138  |  96<L>  |  36.5<H>  ----------------------------<  103<H>  4.5   |  27.0  |  4.23<H>    Ca    9.0      23 Jun 2022 07:01    TPro  6.1<L>  /  Alb  2.8<L>  /  TBili  0.4  /  DBili  x   /  AST  33  /  ALT  20  /  AlkPhos  299<H>  06-23                 Helical Rim Text: The closure involved the helical rim.

## 2022-07-20 ENCOUNTER — APPOINTMENT (OUTPATIENT)
Dept: ELECTROPHYSIOLOGY | Facility: CLINIC | Age: 87
End: 2022-07-20

## 2022-07-20 LAB
CULTURE RESULTS: SIGNIFICANT CHANGE UP
SPECIMEN SOURCE: SIGNIFICANT CHANGE UP

## 2022-07-29 PROBLEM — R50.9 FEVER AND CHILLS: Status: ACTIVE | Noted: 2022-01-01

## 2022-07-29 PROBLEM — S78.112A ABOVE KNEE AMPUTATION OF LEFT LOWER EXTREMITY: Status: ACTIVE | Noted: 2022-01-01

## 2022-08-02 LAB — SURGICAL PATHOLOGY STUDY: SIGNIFICANT CHANGE UP

## 2022-08-15 NOTE — ED ADULT TRIAGE NOTE - IDEAL BODY WEIGHT(KG)
No new care gaps identified.  [facility] Embedded Care Gaps. Reference number: [id number]. 8/15/2022   10:44:20 AM CDT   57

## 2022-09-26 NOTE — ED PROVIDER NOTE - GASTROINTESTINAL [-], MLM
no abdominal pain/no nausea/no vomiting Acitretin Counseling:  I discussed with the patient the risks of acitretin including but not limited to hair loss, dry lips/skin/eyes, liver damage, hyperlipidemia, depression/suicidal ideation, photosensitivity.  Serious rare side effects can include but are not limited to pancreatitis, pseudotumor cerebri, bony changes, clot formation/stroke/heart attack.  Patient understands that alcohol is contraindicated since it can result in liver toxicity and significantly prolong the elimination of the drug by many years.

## 2022-10-07 NOTE — DISCHARGE NOTE PROVIDER - PROVIDER RX CONTACT NUMBER
October 9, 2022     Patient: Jacob Chiang  YOB: 1972  Date of Visit: 10/7/2022      To Whom it May Concern:    Lenny Bain is under my professional care  Rajan Moseley was seen in my office on 10/7/2022  Rajan Moseley may return to work on 10/10/22  If you have any questions or concerns, please don't hesitate to call           Sincerely,          Ca Hoskins, DO        CC: No Recipients
[phone]

## 2022-11-22 NOTE — ED ADULT NURSE NOTE - DOES PATIENT HAVE ADVANCE DIRECTIVE
Consent: The patient's consent was obtained including but not limited to risks of crusting, scabbing, blistering, scarring, darker or lighter pigmentary change, recurrence, incomplete removal and infection. Detail Level: Detailed Medical Necessity Information: It is in your best interest to select a reason for this procedure from the list below. All of these items fulfill various CMS LCD requirements except the new and changing color options. Show Applicator Variable?: Yes Add 52 Modifier (Optional): no Medical Necessity Clause: This procedure was medically necessary because the lesions that were treated were: Post-Care Instructions: I reviewed with the patient in detail post-care instructions. Patient is to wear sunprotection, and avoid picking at any of the treated lesions. Pt may apply Vaseline to crusted or scabbing areas. Spray Paint Text: The liquid nitrogen was applied to the skin utilizing a spray paint frosting technique. No Number Of Freeze-Thaw Cycles: 2 freeze-thaw cycles Duration Of Freeze Thaw-Cycle (Seconds): 3 Application Tool (Optional): Cry-AC

## 2022-12-14 NOTE — RAPID RESPONSE TEAM SUMMARY - NSREASONFORCALLINGRRT_GEN_ALL_CORE
Impression: S/P Cataract Extraction by phacoemulsification with IOL placement OD - 37 Days. Presence of intraocular lens  Z96.1. Plan: RTC prn x refraction. --Advised patient to use artificial tears for comfort. Acute mental status changes/Uncontrolled bleeding

## 2023-01-20 NOTE — ED ADULT NURSE NOTE - FINAL NURSING ELECTRONIC SIGNATURE
Received request via: Patient    Was the patient seen in the last year in this department? Yes    Does the patient have an active prescription (recently filled or refills available) for medication(s) requested? No    Does the patient have snf Plus and need 100 day supply (blood pressure, diabetes and cholesterol meds only)? Patient does not have SCP    
14-Sep-2020 23:34

## 2023-02-02 NOTE — ED PROVIDER NOTE - NSTIMEPROVIDERCAREINITIATE_GEN_ER
08-Aug-2018 13:37 Mauc Instructions: By selecting yes to the question below the MAUC number will be added into the note.  This will be calculated automatically based on the diagnosis chosen, the size entered, the body zone selected (H,M,L) and the specific indications you chose. You will also have the option to override the Mohs AUC if you disagree with the automatically calculated number and this option is found in the Case Summary tab.

## 2023-03-30 NOTE — PHYSICAL THERAPY INITIAL EVALUATION ADULT - ADL SKILLS, REHAB EVAL
2\" (1.575 m)   Wt 105 lb 9.6 oz (47.9 kg)   SpO2 98%   BMI 19.31 kg/m²   Oxygen Saturation Interpretation: Normal.   Constitutional:  Alert, development consistent with age. Eyes:  PERRL, EOMI, no discharge or conjunctival injection. Ears:  External ears without lesions. TM's clear without erythema or perforation bilaterally. Throat:  Pharynx without injection, exudate, or tonsillar hypertrophy. Airway patient. Neck:  Normal ROM. Supple. Lungs:  Clear to auscultation and breath sounds equal.  Heart:  Regular rate and rhythm, normal heart sounds, without pathological murmurs, ectopy, gallops, or rubs. Abdomen:  Soft, nontender, good bowel sounds. No firm or pulsatile mass. Back:  No costovertebral tenderness. Skin:  Normal turgor. Warm, dry, without visible rash, unless noted elsewhere. Neurological:  Alert and oriented. Motor functions intact.    ------------------------------------------Test Results Section----------------------------------------------  (All laboratory and radiology results have been personally reviewed by myself)  Laboratory:    Radiology: All Radiology results interpreted by Radiologist unless otherwise noted. -------------------------------------Impression & Disposition Section-----------------------------------  Impression(s): Lacey was seen today for atrial fibrillation. Diagnoses and all orders for this visit:    Permanent atrial fibrillation ([facility]  Acute on chronic heart failure with preserved ejection fraction (HCC)    -     CBC with Auto Differential; Future  -     Comprehensive Metabolic Panel; Future  -     TSH; Future  -     Brain Natriuretic Peptide; Future  -     Stable. 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  Continue following with heme/onc    Rheumatoid arthritis involving multiple sites with
independent
independent

## 2023-03-30 NOTE — ED PROVIDER NOTE - CPE EDP SKIN NORM
I spoke to patient to reschedule colonoscopy with Dr. Roberts from 03/03/23 to 05/02/23. E-order/Sharepoint updated. Message send to dr. Kory Vyas for blood thinner hold. Patient suggested to wait and send prep medication until the middle of April.   
Patient called to reschedule colonoscopy from 03/03/23. I informed patient one the May schedule is available or if there are any cancellations in April I will call him. Pt verbalized understanding.   
normal...

## 2023-04-04 NOTE — DISCHARGE NOTE NURSING/CASE MANAGEMENT/SOCIAL WORK - NSDPDISTO_GEN_ALL_CORE
Home
adequate calorie/protein intake with diet and supplements once PO diet initiated; soft foods to ease tolerance/(1) partially meets; needs review/practice/verbalization

## 2023-04-05 NOTE — H&P ADULT - REASON FOR ADMISSION
Sandrine Mr. Niño,    Your xray was normal. Please feel free to give us a call with any questions or concerns [phone]. Thank you and have a good day. JOSSIE Estrada symptomatic anemia

## 2023-05-16 NOTE — DIETITIAN INITIAL EVALUATION ADULT - ORAL NUTRITION SUPPLEMENTS
Continue with Nepro TID Sotyktu Pregnancy And Lactation Text: There is insufficient data to evaluate whether or not Sotyktu is safe to use during pregnancy.? ?It is not known if Sotyktu passes into breast milk and whether or not it is safe to use when breastfeeding.??

## 2023-05-19 NOTE — DISCHARGE NOTE NURSING/CASE MANAGEMENT/SOCIAL WORK - NSDCPEPTCAREGIVEDUMATLIST _GEN_ALL_CORE
Asc Procedure Text (A): After obtaining clear surgical margins the patient was sent to an ASC for surgical repair.  The patient understands they will receive post-surgical care and follow-up from the ASC physician. Heart Failure

## 2023-06-28 NOTE — PRE-OP CHECKLIST - NS PREOP CHK INSULIN PUMP THERAPY
Thank you for letting us take care of you  You have been evaluated for chest pain  Please follow-up as instructed  Please return for worsening symptoms  At this time, you have no clinical evidence of symptoms or problems that will require hospitalization, however you should be evaluated soon by a primary care physician, and contact information has been provided  Follow up with your primary care physician  This is important as many medical conditions can be managed as an outpatient, in addition to routine health screening  Seeing your primary doctor often can help identify changes in the medical issue that brought you to the ED for care today  If you experience any new symptoms or acute worsening of current symptoms, please return to the ED 
na

## 2023-07-13 NOTE — H&P ADULT - NSICDXPASTMEDICALHX_GEN_ALL_CORE_FT
no PAST MEDICAL HISTORY:  Anemia     Aortic stenosis     Arrhythmia     AV block, 1st degree     CAD (coronary artery disease)     DM (diabetes mellitus)     RICHARDS (dyspnea on exertion)     ESRD on dialysis HD on Mondays and Fridays    GI bleeding due to ulcerated polyps and colonic AVMs    HTN (hypertension)     Risk factors for obstructive sleep apnea     VT (ventricular tachycardia)

## 2023-07-13 NOTE — PHYSICAL THERAPY INITIAL EVALUATION ADULT - GROSSLY INTACT, SENSORY
"Subjective:      Patient ID: Graham Neri is a 12 y.o. male.    Vitals:  height is 5' 5" (1.651 m) and weight is 52.2 kg (115 lb). His temperature is 98.8 °F (37.1 °C). His blood pressure is 106/72 and his pulse is 87. His respiration is 18 and oxygen saturation is 98%.     Chief Complaint: OTHER (Sports physical )     Patient is a 12 y.o. male who presents to urgent care for a sports physical.   ROS   Objective:     Physical Exam    Assessment:     1. Routine sports physical exam        Plan:       Routine sports physical exam        Patient is cleared for sports.  See scanned images.            "
Patient here for self pay CDL  Cisneros $75 total  
Grossly Intact

## 2023-07-18 NOTE — ED ADULT TRIAGE NOTE - MEANS OF ARRIVAL
General--no acute distress Eyes--anicteric HENT--normocephalic, atraumatic head Neck--no lymphadenopathy Chest--normal breath sounds Heart--regular rate and rhythm Abdomen--soft, RLQ tender with some fullness in this area, non distended, bowel sounds present Musculoskeletal--normal gait and station Skin--no rashes Neurologic--Alert and oriented x 3 Psychiatric--stable mood, appropriate affect ambulatory

## 2023-07-21 NOTE — ED ADULT NURSE NOTE - SUICIDE SCREENING QUESTION 3
10/28/2021         RE: January Dickens  [address] [place] [address]        Dear Colleague,    Thank you for referring your patient, January Dickens, to the [facility]. Please see a copy of my visit note below.    [facility] Dermatology Note  Encounter Date: Oct 28, 2021  Office Visit     Dermatology Problem List:  1. Psoriasis  - Previous rx: TAC, Diprolene lotion.   - Current rx: clobetasol 0.05% solution  ____________________________________________    Assessment & Plan:    1)  Psoriasis, localized pruritis, onycholysis, arthralgia    Discussed topical medications, IL TAC, oral medications including Methotrexate and Otezla, NB-UVB, and biologic medications. Patient is not interested in systemic treatments at this time.   -start Neutrogena T sal or T gel shampoo  - start clobetasol 0.05% solution to affected areas on scalp, body BID for 2-4 weeks (just not face or body folds). Stop Diprolene 0.05% lotion. Begin clobetasol to nails-may need systemic treatment for improvement  - Due to joint pains and stiffness, referred to rheumatology for psoriatic arthritis work up.     Procedures Performed:   None    Follow-up: 1 year or pending rheumatology input    Staff and Scribe:     Scribe Disclosure:  I, Chela Malagon, am serving as a scribe to document services personally performed by Marcia Pro PA-C based on data collection and the provider's statements to me.     Provider Disclosure:  The documentation recorded by the scribe accurately reflects the services I personally performed and the decisions made by me.      ____________________________________________    CC: Psoriasis (scalp and leg)    HPI:  Ms. January Dickens is a(n) 41 year old female who presents today as a new patient for psoriasis. The patient was referred to dermatology by Dr. Matthews for severe psoriasis on the scalp with toenail manifestation.     The patient is here today for psoriasis.  Nutrition Note    RECOMMENDATIONS  PO Diet: NPO. Diet per surgery  Nutrition Support:   Recommend continue to check TG, Mg, Phos, CMP. Recommend continue Clinimix 5/20 at goal rate of 83 mL/hr. Physician/LIP to monitor closely and correct lytes (Phos,Mg,K+). Recommend 250 mL 20% lipids 2 times per week  Recommend FSBS, monitor glucose, need for insulin  Pharmacy to adjust MVI and Trace Elements as needed       NUTRITION ASSESSMENT   Pt triggered for follow-up. Continues NPO since 7/13 with TPN at goal rate of 83 mL/hr. NG tube removed 7/18, G-tube to gravity with 800 mL documented output over the last 24 hours per I/Os. Surgery stated yesterday not sure if ileus is improved yet. Recommend continue TPN as is until po diet can be resumed with adequate intake. RD will continue to monitor. Nutrition Related Findings: -8.2L. Na 132, Mg 2.50. NS started today at 50 mL/hr. POCG 144-174. LBM 7/20, BS+. No edema noted. Wounds: Multiple, Surgical Incision  Nutrition Education:  Education not indicated   Nutrition Goals: Tolerate nutrition support at goal rate, by next RD assessment, Initiate PO diet     MALNUTRITION ASSESSMENT   Acute Illness  Malnutrition Status:  At risk for malnutrition (Comment)    NUTRITION DIAGNOSIS   Inadequate oral intake related to altered GI function as evidenced by NPO or clear liquid status due to medical condition, nutrition support - parenteral nutrition    CURRENT NUTRITION THERAPIES  Diet NPO Exceptions are: Ice Chips, Sips of Water with Meds, Popsicles, Sips of Clear Liquids  PN-Adult Premix 5/20 - Standard Electrolytes - Central Line     PO Intake: NPO   PO Supplement Intake:NPO    Current Parenteral Nutrition Recs:  Type and Formula: Premix Central   Lipids: 250ml, Two times weekly  Duration: Continuous  Current PN Order Provides: As below  Goal PN Orders Provides: Clinimix 5/20 at 83 mL/hr provides 1992 mL total volume, 1755 calories without lipids, 100 grams of protein, and Patient states this has been present for her whole life.  Patient reports the following symptoms: Rash on back of neck and scalp is the worst. Very mild on rest of skin. Flares some years, other years it is calm.  Patient reports the following previous treatments: TAC cream and Diprolene drops BID with some improvement. These creams use to help more, although are still helpful for bothersome patches.  Patient reports the following modifying factors: none.  Associated symptoms: Joint pain in right knee (for 10 years now) and right fingers (for 2 years now). Also reports stiffness in the morning. Uplifting toenails, for years, that can be painful.  She has recently tried dietary changes (cut out red meat), but feels it is too early to know if this has helped. Patient has no other skin complaints today. Also, she is currently being tested for Sjogren's as her mother and sister both have this. Remainder of the HPI, Meds, PMH, Allergies, FH, and SH was reviewed in chart.        Labs Reviewed:  N/A    Physical Exam:  Vitals: /80   LMP 10/11/2021 (Approximate)   Eyes: Conjunctivae/lids: Normal   ENT: Lips:  Normal  MSK: Normal  Cardiovascular: Peripheral edema none  Pulm: Breathing Normal  Neuro/Psych: Orientation: A/O x 3 Normal; Mood/Affect: Normal, NAD, WDWN  Pt accompanied by: self  Following areas examined: Scalp, toenails, fingernails, hands, feet, forearms, legs  Alfaro skin type: ii  Findings:  - Pink inflammed white thickened plaques on occipital scalp.   -lifting of distal nail on left foot  toenail and right hand fingernail  - No other lesions of concern on areas examined.     Medications:  Current Outpatient Medications   Medication     augmented betamethasone dipropionate (DIPROLENE) 0.05 % external lotion     hydrOXYzine (ATARAX) 25 MG tablet     nicotine (NICODERM CQ) 14 MG/24HR 24 hr patch     nystatin (MYCOSTATIN) 932946 UNIT/ML suspension     sertraline (ZOLOFT) 50 MG tablet     triamcinolone  (KENALOG) 0.1 % external cream     No current facility-administered medications for this visit.      Past Medical History:   Patient Active Problem List   Diagnosis     Tobacco use disorder     Major depressive disorder, recurrent episode, moderate (H)     Gastroesophageal reflux disease without esophagitis     MERI (generalized anxiety disorder)     Right leg numbness     Past Medical History:   Diagnosis Date     MERI (generalized anxiety disorder) 8/8/2017     Gastroesophageal reflux disease without esophagitis 4/7/2017     Infectious mononucleosis      Major depressive disorder, recurrent episode, moderate (H) 4/7/2017     Other psoriasis      Right leg numbness 3/23/2018         Len Matthews MD  [address] [place] [address] on close of this encounter.        Again, thank you for allowing me to participate in the care of your patient.        Sincerely,        Marcia Pro PA-C     No

## 2023-07-26 NOTE — PROGRESS NOTE ADULT - ASSESSMENT
85 y/o M with extensive PMH including HTN, HLD, CAD, HFpEF, s/p Right CEA for Carotid artery disease, ESRD on HD 2d/week (M/Fri) via LUE fistula, s/p flecainide w/ ILR placed 6/2020, AS s/p TAVR, and PAD (RLE fem-pop bypass October 2020) who presented to [facility] on 3/13/21 with GI bleed, as well as LLE pain due to occulsion of L superficial fem artery.  He had a complicated hospital course and also had acute DVT, PNA, bilateral pleural effusions.  Pt apparently was not a candidate for an IVC filter.  #ESRD on HD  -HD MWF  #PAD  -s/p fem-pop bypass and revision  -Continue Plavix    #Acute left femoral vein DVT  -Not on full dose AC due to GIB and high risk. If H/H remains stable and pt without recurrent episodes, we may restart AC later on. Recommend discussing with family  -DVT ppx with HSQ  -Per chart - patient not a candidate for IVC filter??    #Acute blood loss anemia, stable, likely due to UGIB  -EGD showed multiple AVMs, cauterized during EGD  -Protonix 40mg po bid  -Monitor Hg/Hct, transfuse if Hg <8  -EPO with dialysis on M + F    #Chronic diastolic CHF, stable  #Bioprosthetic aortic valve  -TTE done 3/14/2021; reviewed reading  -AV normally functioning  -continue Cardizem 240mg daily    #HTN  -Chronic fluctuating SBP  -Continue Hydralazine 25mg BID for now and closely monitor BP  -C/w Imdur w/ holding parameters  -Continue Cardizem   -Monitor vitals    #DM-II, A1c 5.7 3/26  - ISS  - fingersticks q ac and hs    #BPH  -continue Flomax  General

## 2023-07-27 NOTE — ASU PATIENT PROFILE, ADULT - HEALTH/HEALTHCARE ANXIETIES, PROFILE
Outgoing call regarding Venclexta refill, pt states he stop medication per provider due to his palates being. Pt states he has 16 tablets on hand and his next appointment is 8/3 which he will be off medication til  then. Informed pt will follow up on 8/3.   none

## 2023-07-27 NOTE — ED ADULT NURSE NOTE - DISCHARGE DATE/TIME
Kilograms Preamble Statement (Weight Entered In Details Tab): Reported Weight in kilograms: 08-May-2020 21:08 Odomzo Counseling- I discussed with the patient the risks of Odomzo including but not limited to nausea, vomiting, diarrhea, constipation, weight loss, changes in the sense of taste, decreased appetite, muscle spasms, and hair loss.  The patient verbalized understanding of the proper use and possible adverse effects of Odomzo.  All of the patient's questions and concerns were addressed.

## 2023-09-07 NOTE — PROGRESS NOTE ADULT - SUBJECTIVE AND OBJECTIVE BOX
[facility] DIVISION OF KIDNEY DISEASES AND HYPERTENSION -- HEMODIALYSIS NOTE  --------------------------------------------------------------------------------  Chief Complaint: ESRD/Ongoing hemodialysis requirement    24 hour events/subjective:  no acute event  pt states he feels well        PAST HISTORY  --------------------------------------------------------------------------------  No significant changes to PMH, PSH, FHx, SHx, unless otherwise noted    ALLERGIES & MEDICATIONS  --------------------------------------------------------------------------------  Allergies    Plavix (Hives)  Toprol-XL (Rash)    Intolerances      Standing Inpatient Medications  atorvastatin 80 milliGRAM(s) Oral at bedtime  chlorhexidine 2% Cloths 1 Application(s) Topical <User Schedule>  DULoxetine 90 milliGRAM(s) Oral daily  enoxaparin Injectable 65 milliGRAM(s) SubCutaneous daily  epoetin juan-epbx (RETACRIT) Injectable 71518 Unit(s) IV Push <User Schedule>  isosorbide   mononitrate ER Tablet (IMDUR) 30 milliGRAM(s) Oral daily  NIFEdipine XL 90 milliGRAM(s) Oral daily  NIFEdipine XL 60 milliGRAM(s) Oral at bedtime  pantoprazole    Tablet 40 milliGRAM(s) Oral two times a day  tamsulosin 0.4 milliGRAM(s) Oral at bedtime  torsemide 20 milliGRAM(s) Oral <User Schedule>    PRN Inpatient Medications      REVIEW OF SYSTEMS  --------------------------------------------------------------------------------  Gen: No weight changes, fatigue, fevers/chills, weakness  Skin: No rashes  Head/Eyes/Ears/Mouth: No headache  Respiratory: No dyspnea, cough,  CV: No chest pain, orthopnea  GI: No abdominal pain, diarrhea, constipation, nausea, vomiting,  MSK: No joint pain  Neuro: No dizziness/lightheadedness, weakness  Heme: No bleeding  Psych: No significant nervousness, anxiety, stress, depression    All other systems were reviewed and are negative, except as noted.    VITALS/PHYSICAL EXAM  --------------------------------------------------------------------------------  T(C): 36.6 (06-13-21 @ 10:43), Max: 38.1 (06-13-21 @ 05:12)  HR: 79 (06-13-21 @ 10:43) (75 - 79)  BP: 144/62 (06-13-21 @ 10:43) (144/62 - 168/92)  RR: 18 (06-13-21 @ 10:43) (18 - 18)  SpO2: 100% (06-13-21 @ 10:43) (96% - 100%)  Wt(kg): --        06-12-21 @ 07:01  -  06-13-21 @ 07:00  --------------------------------------------------------  IN: 0 mL / OUT: 200 mL / NET: -200 mL      Physical Exam:  	Gen: NAD,  	HEENT: PERRL, supple neck,  	Pulm: CTA B/L; pigtail catheter+  	CV: RRR, S1S2; no rub  	Abd: +BS, soft, nontender  	UE: Warm  	LE: Warm, no  edema  	Neuro: No focal deficits  	Psych: Normal affect and mood  	Skin: Warm, without rashes  	Vascular access: RUE AVF    LABS/STUDIES  --------------------------------------------------------------------------------              9.2    7.82  >-----------<  230      [06-13-21 @ 06:17]              30.3     Iron 53, TIBC 266, %sat 20      [08-19-20 @ 06:32]  Ferritin 184      [08-19-20 @ 06:32]  PTH -- (Ca 9.6)      [08-19-20 @ 16:08]   91  Vitamin D (25OH) 26.4      [08-19-20 @ 16:08]  HbA1c 4.9      [08-09-18 @ 05:54]  TSH 2.16      [09-15-20 @ 02:01]  Lipid: chol 126, , HDL 60, LDL --      [06-03-21 @ 06:07]     Odomzo Counseling- I discussed with the patient the risks of Odomzo including but not limited to nausea, vomiting, diarrhea, constipation, weight loss, changes in the sense of taste, decreased appetite, muscle spasms, and hair loss.  The patient verbalized understanding of the proper use and possible adverse effects of Odomzo.  All of the patient's questions and concerns were addressed.

## 2023-09-12 NOTE — ED ADULT TRIAGE NOTE - INTERNATIONAL TRAVEL
Consent signed by patient and MD.  Copy given to treatment room RN. Copy faxed to medical records. Original placed in folder in treatment room.   No

## 2023-09-28 NOTE — PATIENT PROFILE ADULT - NSPROPOAURINARYCATHETER_GEN_A_NUR
4-year-old female history of constipation and autism spectrum disorder presents to the emergency department with mother complaining of constipation.  No bowel movement in 3 days.  Unclear if it is behavioral or diet related.  It is a longstanding issue and they had follow-up care while living in [place] however have been here for 2 weeks.  Parents are currently doing MiraLAX once daily.  No results in the past 3 days.  There is no fever chills no vomiting.  Patient is eating small amounts but well.  We discussed diet which includes fruits, cereal, ice cream, French fries.  Mother would like assistance with arranging follow-up locally as she has lived here in the past 2 weeks.
no

## 2023-10-04 NOTE — PATIENT PROFILE ADULT - LEGAL HELP
The patient is Stable - Low risk of patient condition declining or worsening    Shift Goals  Clinical Goals: Maintain fundus firm, lochia light; pain management  Patient Goals:   Family Goals:     Progress made toward(s) clinical / shift goals:  Fundus firm, lochia scant; pt reported pain relief with prn pain medication.   no

## 2023-10-11 NOTE — OCCUPATIONAL THERAPY INITIAL EVALUATION ADULT - VISUAL ACUITY
PROVIDER:[TOKEN:[[id number]]],PROVIDER:[TOKEN:[[id number]]],PROVIDER:[TOKEN:[[id number]]],PROVIDER:[TOKEN:[[id number]]]
impaired; continue to assess. Pt unable to correctly identify # of digits held in central/peripheral fields

## 2024-02-10 NOTE — PHYSICAL THERAPY INITIAL EVALUATION ADULT - SITTING BALANCE: STATIC
[facility] - Surgical Intensive Care  Critical Care - Surgery  Progress Note    Patient Name: Sarah Saravia  MRN: [id number]  Admission Date: 1/29/2024  Hospital Length of Stay: 12 days  Code Status: Full Code  Attending Provider: Steve Cole MD  [facility]   Principal Problem: Ayaz's gangrene    Subjective:     Hospital/ICU Course:  No notes on file    Interval History/Significant Events: Pt seen and examined at bedside. BISHNU MULLER.       Follow-up For: Procedure(s) (LRB):  DEBRIDEMENT, WOUND w wound vac change (Right)    Post-Operative Day: 1 Day Post-Op    Objective:     Vital Signs (Most Recent):  Temp: 98.3 °F (36.8 °C) (02/09/24 1900)  Pulse: 96 (02/10/24 0431)  Resp: (!) 22 (02/10/24 0431)  BP: (!) 161/71 (02/10/24 0300)  SpO2: 100 % (02/10/24 0431) Vital Signs (24h Range):  Temp:  [98.3 °F (36.8 °C)-98.6 °F (37 °C)] 98.3 °F (36.8 °C)  Pulse:  [85-98] 96  Resp:  [15-22] 22  SpO2:  [97 %-100 %] 100 %  BP: (139-197)/(64-88) 161/71  Arterial Line BP: (124-196)/(53-77) 139/54     Weight: (!) 153.3 kg (338 lb)  Body mass index is 56.25 kg/m².      Intake/Output Summary (Last 24 hours) at 2/10/2024 0532  Last data filed at 2/10/2024 0100  Gross per 24 hour   Intake 2217.35 ml   Output 3585 ml   Net -1367.65 ml          Physical Exam     Vitals reviewed.   Constitutional:       Appearance: She is obese.   HENT:      Head: Normocephalic.      Mouth/Throat:      Mouth: Mucous membranes are moist.   Eyes:      Comments: Bilateral R gaze   Neck:      Comments: RIJ Trialysis  Cardiovascular:      Rate and Rhythm: Normal rate and regular rhythm.   Pulmonary:      Effort: Pulmonary effort is normal.      Comments: Intubated  Abdominal:      General: Abdomen is flat.      Palpations: Abdomen is soft.   Genitourinary:     Comments: R groin wound   Glynn in place  Rectal tube  Musculoskeletal:      Right lower leg: Edema present.      Left lower leg: Edema present.       LUE: Edema present     Comments: R radial A-line   Skin:     General: Skin is warm.   Neurological:      Comments: Opens eyes to pain but does not withdraw           Lines/Drains/Airways       Central Venous Catheter Line  Duration             Trialysis (Dialysis) Catheter 02/06/24 1345 right internal jugular 3 days              Drain  Duration                  Rectal Tube 02/04/24 1545 5 days         NG/OG Tube 02/06/24 1030 Center mouth 3 days         Urethral Catheter 02/06/24 1900 3 days              Airway  Duration                  Airway - Non-Surgical 01/31/24 1020 9 days              Arterial Line  Duration             Arterial Line 01/31/24 1025 Right Radial 9 days              Peripheral Intravenous Line  Duration                  Peripheral IV - Single Lumen 02/04/24 2241 20 G Right Antecubital 5 days         Peripheral IV - Single Lumen 02/08/24 0152 18 G Anterior;Left Forearm 2 days         Peripheral IV - Single Lumen 02/09/24 0130 18 G Anterior;Right Forearm 1 day                    Significant Labs:    CBC/Anemia Profile:  Recent Labs   Lab 02/09/24  0335 02/10/24  0333   WBC 16.02*  16.02* 15.42*   HGB 7.1*  7.1* 7.0*   HCT 22.7*  22.7* 21.4*     387 387   MCV 85  85 82   RDW 17.6*  17.6* 18.1*        Chemistries:  Recent Labs   Lab 02/09/24  0335 02/09/24  1312 02/10/24  0333   * 133* 133*  133*   K 4.1 4.2 4.3  4.3    106 104  104   CO2 16* 19* 17*  17*   BUN 59* 37* 41*  41*   CREATININE 4.4* 3.3* 3.5*  3.5*   CALCIUM 8.0* 7.9* 8.1*  8.1*   ALBUMIN 1.8* 1.7* 2.1*  2.1*   PROT 7.1  --  7.0  7.0   BILITOT 0.2  --  0.3  0.3   ALKPHOS 129  --  132  132   ALT 10  --  11  11   AST 31  --  27  27   MG 2.1  --  1.9   PHOS 8.6* 6.5* 7.7*         Significant Imaging:  I have reviewed all pertinent imaging results/findings within the past 24 hours.  Assessment/Plan:     Renal/  * Ayaz's gangrene    Neuro/Psych:   #Acute Encephalopathy  CTH 2/3 with scattered  hypoattenuation likely representing age indeterminate infarcts. EEG findings consistent with moderate-severe encephalopathy. MRI 2/6: Several scattered punctate acute infarcts throughout the bilateral frontal lobes, centrum semiovale, basal ganglia, corpus callosum, and cerebellar hemispheres.  CTA 2/6: Atherosclerotic plaquing of the carotid bifurcations and proximal ICAs with less than 50% proximal ICA stenosis by NASCET criteria . 2/7 CTH unchanged from prior CT. Ddx includes septic embolic vs inflammatory vs hypercoagulopathy.     -- Sedation: None  -- Pain: kermit tylenol, dialudid and oxy prn  -- GCS 7T: E2, V1T, M4; exam stable  -- Neurology following; appreciate recs  -- Neurovascular following; appreciate recs  -- Palliative following; GOC conversation 2/7; appreciate recs  -- Non contrast MRA/MRV             Cards:   -- HDS  -- goal SBP < 180, MAP >65  -- hypertensive, hydralazine and labetalol prns  -- Continue amlodipine 10mg daily;  coreg 6.25mg BID      Pulm:   #Acute Respiratory Failure  -- Intubated on spontaneous , Pressure support.  -- Goal O2 sat > 90%      Renal:  #ALVARADO on CKD  #ATN  Received HD 2/9  -- Nephrology following; appreciate recs  -- PO4 at 7.7. Will talk to nephrology about recs  -- RRT as needed  -- Transition to Lasix 120mg challenge after HD  Recent Labs   Lab 02/09/24  0335 02/09/24  1312 02/10/24  0333   BUN 59* 37* 41*  41*   CREATININE 4.4* 3.3* 3.5*  3.5*        FEN / GI:   -- Daily CMPs  -- NGT in place   -- Replace lytes as needed  -- Nutrition: continue TF (Novasource Renal) at goal 30 ml/hr   -- GI PPX   -- Nutrition following; appreciate recs  -- Continue psyllium      ID:    #Ayaz's gangrene  s/p OR debridement for nec fascitis. R groin tissue with proteus, sensitive to ceftriaxone. Growing bacteroids as well. Debridement and wound vac change 2/7  -- Abx: ceftriaxone and flagyl  -- ID following ; appreciate recs    Recent Labs   Lab 02/08/24  0302 02/09/24  0335  02/10/24  0333   WBC 20.99* 16.02*  16.02* 15.42*        Heme/Onc:  -- Daily CBC  -- Heparin DVT ppx    Recent Labs   Lab 02/08/24  0302 02/09/24 0335 02/10/24  0333   HGB 7.3* 7.1*  7.1* 7.0*    387  387 387        Endo:   #IDDM  Initially presented to OSH with DKA with latest A1C 10.4 AG Gap resolved  - Placed on insulin gtt 2/3: Transition IV insulin infusion at 1.8 u/hr with stepdown parameters   - q4h BG monitoring while NPO  - Resume Novolog to 10 units units q 4 hrs when tube feeds restarted   - Moderate dose SSI PRN  - Endocrine following, appreciate recs        PPx:   Feeding: NPO  Analgesia/Sedation: See above  Thromboembolic prevention: SQH   HOB >30: Y  Stress Ulcer ppx: Famotidine  Glucose control: Critical care goal 140-180 g/dl, Insulin gtt, kermit novolog, SSI, Endo following  Bowel reg: psyllium  Invasive Lines/Drains/Airway: Glynn, ETT, Art line, Trialysis, PIV x2, Rectal tube      Dispo/Code Status/Palliative:   -- SICU / Full Code        Robinson Rousseau MD  Critical Care - Surgery  [facility] - Surgical Intensive Care       fair balance

## 2024-02-18 NOTE — PROGRESS NOTE ADULT - SUBJECTIVE AND OBJECTIVE BOX
HPI:  Pt is a 87 y/o M with PMHx of HTN, HLD, CAD, HFpEF, s/p Right CEA for Carotid artery disease, ESRD on HD 2d/week (M/Fri) via LUE fistula, s/p flecainide w/ ILR placed 6/2020, AS s/p TAVR, and PAD (RLE fem-pop bypass October 2020) who presented to [facility] on 3/13/21 with GI bleed, as well as LLE pain. 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 When patient was stable from GI, medical, cardiac standpoint, he went for LLE angiogram on 3/18, and found occlusion of L Superficial femoral artery. He was planned for a LLE fem-pop bypass, and had the bypass performed on 3/20. On 3/22, patient was a RRT for AMS, fever. Work-up revealed pleural effusions and L femoral DVT. He was started on abx for presumed pna and hep gtt for DVT. Patient began to show signs of malperfusion with coolness to touch, mottling of skin to LLE. He was planned for revision of LLE bypass. Went to OR on 3/25 for LLE bypass revision with explant of PTFE bypass, CFA to AT bypass with cryovein performed. Intra-op patient with 1500 ml of blood loss requiring 8 unit PRBC, 4 unit FFP, 1 donor unit plt.  During procedure, pt lost function of PPM with period of asystole requiring transcutaneous pacing. Cardiology consulted and pacemaker interrogated in the OR.  Pt taken to PACU post op and SICU consulted. Patient went to SICU post-op. On 3/27 patient was extubated and was downgraded from SICU to floor the next day. Rest of hospital course was uneventful. He worked with PT, was evaluated by PM&R and approved for acute rehab. The patient remained on heparin sq while in the hospital for tx of dvt (his plavix was held). Upon discharge, approved by CHALINO and Dr. Albrecht to resume plavix. He will remain on heparin sq while at rehab for dvt tx and ppx. He is not a candidate for IVC filter. Upon discharge, pain is well controlled, tolerating diet, remains HD stable, no clinical signs/symptoms of GI bleed, LLE remains warm and well perfused, he has a strong LLE palpable graft pulse and dopplerable signals. Per attending, he is medically stable for discharge to [facility] and on 4/8/21 he was transferred to [facility] for acute inpatient rehabilitation.    SUBJECTIVE / INTERVAL HPI: Patient seen and examined this morning at bedside. Patient in bed in NAD, no SOB, no n/v, left leg pain controlled.     REVIEW OF SYSTEMS:    CONSTITUTIONAL: No fevers   EYES/ENT: No vertigo or throat pain   RESPIRATORY: No cough or shortness of breath  CARDIOVASCULAR: No chest pain   GASTROINTESTINAL: No abdominal or epigastric pain. No nausea or vomiting.   GENITOURINARY: No dysuria  NEUROLOGICAL:  + weakness  SKIN: No itching      VITAL SIGNS:  Vital Signs Last 24 Hrs  T(C): 36.6 (17 Apr 2021 08:16), Max: 36.7 (16 Apr 2021 20:14)  T(F): 97.9 (17 Apr 2021 08:16), Max: 98.1 (16 Apr 2021 20:14)  HR: 66 (17 Apr 2021 08:16) (61 - 69)  BP: 169/70 (17 Apr 2021 08:16) (118/48 - 169/70)  BP(mean): 50 (16 Apr 2021 17:40) (50 - 50)  RR: 15 (17 Apr 2021 08:16) (15 - 16)  SpO2: 96% (17 Apr 2021 08:16) (96% - 97%)    PHYSICAL EXAM:    General: NAD  HEENT: NC/AT; MMM  Neck: supple  Cardiovascular: +S1/S2, RRR  Respiratory: CTA B/L, but breath sounds are decreased at bases  Gastrointestinal: soft, NT/ND  Extremities: LUE AV fistula intact, LLE lower extremities with edema  Neurological: AAOx2, Motor exam grossly intact, no sensory deficits  Skin: Decubitus bilateral heel pressure ulcers DTI, Left groin and lateral thigh with staples. Left calf dressing with serosanguinous drainage, changed - incisions: lateral (suture) and medial (staples).  Gangrenous toes on left foot. Decubitus sacral and buttock ulcers     MEDICATIONS:  MEDICATIONS  (STANDING):  clopidogrel Tablet 75 milliGRAM(s) Oral daily  dextrose 40% Gel 15 Gram(s) Oral once  dextrose 5%. 1000 milliLiter(s) (50 mL/Hr) IV Continuous <Continuous>  dextrose 5%. 1000 milliLiter(s) (100 mL/Hr) IV Continuous <Continuous>  dextrose 50% Injectable 25 Gram(s) IV Push once  dextrose 50% Injectable 12.5 Gram(s) IV Push once  dextrose 50% Injectable 25 Gram(s) IV Push once  diltiazem    milliGRAM(s) Oral daily  DULoxetine 60 milliGRAM(s) Oral daily  epoetin juan-epbx (RETACRIT) Injectable 51658 Unit(s) IV Push <User Schedule>  glucagon  Injectable 1 milliGRAM(s) IntraMuscular once  heparin   Injectable 5000 Unit(s) SubCutaneous every 8 hours  hydrALAZINE 50 milliGRAM(s) Oral three times a day  insulin lispro (ADMELOG) corrective regimen sliding scale   SubCutaneous two times a day with meals  isosorbide   mononitrate ER Tablet (IMDUR) 30 milliGRAM(s) Oral daily  melatonin 6 milliGRAM(s) Oral at bedtime  Nephro-pito 1 Tablet(s) Oral daily  pantoprazole    Tablet 40 milliGRAM(s) Oral two times a day  polyethylene glycol 3350 17 Gram(s) Oral daily  senna 2 Tablet(s) Oral at bedtime  tamsulosin 0.4 milliGRAM(s) Oral at bedtime    MEDICATIONS  (PRN):  acetaminophen   Tablet .. 650 milliGRAM(s) Oral every 6 hours PRN Temp greater or equal to 38C (100.4F), Mild Pain (1 - 3)  artificial  tears Solution 1 Drop(s) Both EYES two times a day PRN Dry Eyes      ALLERGIES:  Allergies    Plavix (Hives)  Toprol-XL (Rash)    Intolerances        LABS:          LABS:                        10.2   7.15  )-----------( 302      ( 16 Apr 2021 14:20 )             31.6     16 Apr 2021 14:20    135    |  99     |  42     ----------------------------<  129    4.2     |  29     |  4.66     Ca    8.9        16 Apr 2021 14:20  Phos  5.1       16 Apr 2021 14:20    TPro  x      /  Alb  2.3    /  TBili  x      /  DBili  x      /  AST  x      /  ALT  x      /  AlkPhos  x      16 Apr 2021 14:20                  11.2   5.92  )-----------( 298      ( 15 Apr 2021 07:32 )             34.8     04-15    136  |  100  |  25<H>  ----------------------------<  120<H>  4.0   |  30  |  3.31<H>    Ca    8.9      15 Apr 2021 07:32          CAPILLARY BLOOD GLUCOSE      POCT Blood Glucose.: 100 mg/dL (16 Apr 2021 07:30)   [FreeTextEntry1] : pap performed of vaginall cuff advised to inform PMD of elevated /80

## 2024-02-27 NOTE — DIETITIAN INITIAL EVALUATION ADULT. - ENERGY INTAKE
Subjective   Patient ID: Madiha is a 28 as needed or take cough medication as prescribed.  Use albuterol inhaler as needed  Instructions provided as documented in the AVS.  Thank you for visiting [facility].  Please follow up with your PCP ASAP.    MDM     Amount and/or Complexity of Data Reviewed  Clinical lab tests: ordered and reviewed    Risk of Complications, Morbidity, and/or Mortality  Presenting problems: moderate  Diagnostic procedures: moderate  Management options: moderate         Fair (>50%)

## 2024-03-22 NOTE — H&P PST ADULT - PSH
Group Therapy Note    Date: 3/22/2024    Group Start Time: 10:40 AM  Group End Time: 11:30 AM  Group Topic: Cognitive Skills    [facility]    Sal Capellan LCSW        Group Therapy Note    The patients were encouraged to participate in discussion regarding moving out of the childhood home and to self-disclose, provide feedback, support, and advice to each other.      Attendees: 10       Patient's Goal: self-disclose, provide support, feedback, and advice.       Notes: The patient participated in process group. The patient listened to a peer discuss anxiety, and decisions regarding moving out of her home. The patient shared feedback, support, and personal experience with living outside of their parents' home with peers.  The patient shared experience with moving out and having to move back in with parents, with peers.  The patient will continue to provide feedback, support, and advice to peers during the cognitive skills group.      Status After Intervention:  Unchanged    Participation Level: Active Listener and Interactive    Participation Quality: Appropriate, Attentive, Sharing, and Supportive      Speech:  normal      Thought Process/Content: Logical      Affective Functioning: Congruent      Mood: euthymic      Level of consciousness:  Alert, Oriented x4, and Attentive      Response to Learning: Able to verbalize current knowledge/experience, Capable of insight, and Progressing to goal      Endings: None Reported    Modes of Intervention: Support, Socialization, and Exploration      Discipline Responsible: /Counselor      Signature:  Sal Capellan LCSW    
 used
A-V fistula  left arm 5/2017  H/O angioplasty  2013,  no  intervention  H/O circumcision  at  age  65  H/O left knee surgery

## 2024-03-27 NOTE — PATIENT PROFILE ADULT - NSPROPTRIGHTCAREGIVER_GEN_A_NUR
Render Post-Care Instructions In Note?: no Detail Level: Detailed Show Aperture Variable?: Yes Consent: The patient's consent was obtained including but not limited to risks of crusting, scabbing, blistering, scarring, darker or lighter pigmentary change, recurrence, incomplete removal and infection. Post-Care Instructions: I reviewed with the patient in detail post-care instructions. Patient is to wear sunprotection, and avoid picking at any of the treated lesions. Pt may apply Vaseline to crusted or scabbing areas. Duration Of Freeze Thaw-Cycle (Seconds): 0 yes

## 2024-04-02 NOTE — ED ADULT TRIAGE NOTE - CHIEF COMPLAINT QUOTE
Problem: Discharge Planning  Goal: Discharge to home or other facility with appropriate resources  Outcome: Progressing  Flowsheets (Taken 3/31/2024 2100 by Dee Dee Prakash, RN)  Discharge to home or other facility with appropriate resources: Identify barriers to discharge with patient and caregiver     Problem: Pain  Goal: Verbalizes/displays adequate comfort level or baseline comfort level  Outcome: Progressing  Flowsheets (Taken 4/1/2024 2015)  Verbalizes/displays adequate comfort level or baseline comfort level:   Encourage patient to monitor pain and request assistance   Assess pain using appropriate pain scale     Problem: Chronic Conditions and Co-morbidities  Goal: Patient's chronic conditions and co-morbidity symptoms are monitored and maintained or improved  Outcome: Progressing  Flowsheets (Taken 3/31/2024 2100 by Dee Dee Prakash, RN)  Care Plan - Patient's Chronic Conditions and Co-Morbidity Symptoms are Monitored and Maintained or Improved: Monitor and assess patient's chronic conditions and comorbid symptoms for stability, deterioration, or improvement     Problem: Safety - Adult  Goal: Free from fall injury  Outcome: Progressing  Flowsheets (Taken 3/31/2024 2222 by Dee Dee Prakash, RN)  Free From Fall Injury: Instruct family/caregiver on patient safety     Problem: Skin/Tissue Integrity  Goal: Absence of new skin breakdown  Description: 1.  Monitor for areas of redness and/or skin breakdown  2.  Assess vascular access sites hourly  3.  Every 4-6 hours minimum:  Change oxygen saturation probe site  4.  Every 4-6 hours:  If on nasal continuous positive airway pressure, respiratory therapy assess nares and determine need for appliance change or resting period.  Outcome: Progressing     Problem: Occupational Therapy - Adult  Goal: By Discharge: Performs self-care activities at highest level of function for planned discharge setting.  See evaluation for individualized goals.  Description: FUNCTIONAL STATUS  pt presents to ED sent by PMD for lo H&H. pt pale. pt denies dizziness. denies use of anticoagulants. pt on baby aspirin daily. pt denies active bleeding.

## 2024-04-19 NOTE — PROGRESS NOTE ADULT - SUBJECTIVE AND OBJECTIVE BOX
[facility]                                   [address] [place] [address]                                                      PHONE: [phone]                                                         FAX: [phone]  [email or web address]  ---------------------------------------------------------------------------------------------------------------------------------    Overnight events/patient complaints:    NAD feeling well today     Plavix (Hives)  Toprol-XL (Rash)    MEDICATIONS  (STANDING):  aspirin enteric coated 81 milliGRAM(s) Oral daily  atorvastatin 80 milliGRAM(s) Oral at bedtime  dextrose 5%. 1000 milliLiter(s) (30 mL/Hr) IV Continuous <Continuous>  epoetin juan-epbx (RETACRIT) Injectable 86365 Unit(s) SubCutaneous <User Schedule>  hydrALAZINE 50 milliGRAM(s) Oral two times a day  isosorbide   mononitrate ER Tablet (IMDUR) 30 milliGRAM(s) Oral daily  multivitamin 1 Tablet(s) Oral daily  NIFEdipine XL 60 milliGRAM(s) Oral at bedtime  NIFEdipine XL 90 milliGRAM(s) Oral daily  sertraline 25 milliGRAM(s) Oral at bedtime  torsemide 20 milliGRAM(s) Oral <User Schedule>    MEDICATIONS  (PRN):      Vital Signs Last 24 Hrs  T(C): 36.7 (18 Aug 2020 05:42), Max: 36.9 (17 Aug 2020 14:31)  T(F): 98.1 (18 Aug 2020 05:42), Max: 98.5 (17 Aug 2020 14:31)  HR: 80 (18 Aug 2020 05:42) (72 - 108)  BP: 151/67 (18 Aug 2020 05:42) (90/42 - 159/85)  BP(mean): --  RR: 16 (18 Aug 2020 05:42) (16 - 18)  SpO2: 98% (18 Aug 2020 05:42) (93% - 100%)  ICU Vital Signs Last 24 Hrs  CHRISTIANO ELIZABETH  I&O's Detail    17 Aug 2020 07:01  -  18 Aug 2020 07:00  --------------------------------------------------------  IN:    Oral Fluid: 200 mL  Total IN: 200 mL    OUT:    Estimated Blood Loss: 100 mL    Other: 1800 mL    Voided: 225 mL  Total OUT: 2125 mL    Total NET: -1925 mL        I&O's Summary    17 Aug 2020 07:01  -  18 Aug 2020 07:00  --------------------------------------------------------  IN: 200 mL / OUT: 2125 mL / NET: -1925 mL      Drug Dosing Weight  CHRISTIANO ELIZABETH      PHYSICAL EXAM:  General: Appears well developed, well nourished alert and cooperative.  HEENT: Head; normocephalic, atraumatic.  Eyes: Pupils reactive, cornea wnl.  Neck: Supple, no nodes adenopathy, no NVD or carotid bruit or thyromegaly.  CARDIOVASCULAR: Normal S1 and S2, 3/6 murmur, rub, gallop or lift.   LUNGS: Decrease BS B/L   ABDOMEN: Soft, nontender without mass or organomegaly. bowel sounds normoactive.  EXTREMITIES: No clubbing, cyanosis or edema. Distal pulses wnl.   SKIN: warm and dry with normal turgor.  NEURO: Alert/oriented x 3/normal motor exam. No pathologic reflexes.    PSYCH: normal affect.        LABS:                        9.2    8.79  )-----------( 227      ( 18 Aug 2020 06:56 )             28.0     08-18    140  |  97<L>  |  32.0<H>  ----------------------------<  118<H>  3.5   |  28.0  |  3.18<H>    Ca    9.6      18 Aug 2020 06:56      CHRISTIANO ELIZABETH            RADIOLOGY & ADDITIONAL STUDIES:    INTERPRETATION OF TELEMETRY (personally reviewed):        ECHO:  Summary:   1. Severely enlarged left atrium.   2. There is moderate concentric left ventricular hypertrophy.   3. Left ventricular ejection fraction, by visual estimation, is 60 to 65%. Grade III diastolic dysfunction.   4. Elevated mean left atrial pressure. (LAP = 41 mm Hg)   5. Normal right atrial size.   6. Normal right ventricular size and function.   7. Mild mitral valve regurgitation.   8. Moderate to severe aortic valve stenosis.   9. There is no evidence of pericardial effusion.    MD Rohith, RPVI Electronically signed on 8/16/2020 at 3:29:26 PM        CARDIAC CATHETERIZATION:  VENTRICLES: No left ventriculogram was performed.  CORONARY VESSELS: The coronary circulation is co-dominant.  LM:   --  Ostial LM: There was a 30 % stenosis.  LAD:   --  Mid LAD: There was a 50 % stenosis.  --  Distal LAD: There was a 60 % stenosis.  CX:   --  Proximal circumflex: There was a 20 % stenosis.  RCA:   --  Mid RCA: There was a 70 % stenosis. Superior takeoff.  COMPLICATIONS: No complications occurred during the cath lab visit.  DIAGNOSTIC IMPRESSIONS: Severe RCA disease but co-dominant vessel, does not  supply large territory.  Moderate LAD disease- similar to angiogram from 2017.  Peak to peak gardient of 55-60 mmHg- consistent with severe AS.  LVEDP 14 mmHg.  DIAGNOSTIC RECOMMENDATIONS: Medical management of CAD.  TAVR evaluation. (Plavix allergy- may have to consider using effient as  DAPT post TAVR)  Prepared and signed by  Raymond Mariscal MD  Signed 08/17/2020 19:11:56  HEMODYNAMIC TABLES  Pressures:  Baseline      ASSESSMENT AND PLAN:  In summary, CHRISTIANO ELIZABETH is an 86y Male with past medical history significant for HTN HLD none obstructive CAD HFpEF ESRD on HD severe AS normal EF s/p Right and Left cath see above     Plan  1.  TAVR likely this Friday   2.  HD as per renal   3.  Continue current optimal medical therapy Hypertensive Disorder

## 2024-05-23 NOTE — ED PROVIDER NOTE - NS ED MD DISPO DIVISION
[facility]  [facility]  [address] [place] [address]  Dept: [phone]  Dept Fax: [phone]     5/23/2024      Braulio Zavala   1992     Chief Complaint   Patient presents with    New Patient     Establishing care , wanting male  doctor        HPI  Pt comes in today as a NP to establish care. He was recently seen in ER 5/13 for penile rash. He was given RX for topical clotrimazole-betamethasone. He has been using this. Rash has essentially cleared, but some of the color change in this area still present. Reviewed past hx. No other acute concerns.         5/23/2024     2:00 PM   PHQ Scores   PHQ2 Score 0   PHQ9 Score 0     Interpretation of Total Score Depression Severity: 1-4 = Minimal depression, 5-9 = Mild depression, 10-14 = Moderate depression, 15-19 = Moderately severe depression, 20-27 = Severe depression     Prior to Visit Medications    Medication Sig Taking? Authorizing Provider   clotrimazole-betamethasone (LOTRISONE) 1-0.05 % cream Apply topically 2 times daily. Yes José Miguel Handy PA-C       Past Medical History:   Diagnosis Date    Seasonal allergies         Social History     Tobacco Use    Smoking status: Never    Smokeless tobacco: Never   Vaping Use    Vaping Use: Never used   Substance Use Topics    Alcohol use: Yes     Comment: socially    Drug use: Yes     Types: Marijuana (Weed)        Past Surgical History:   Procedure Laterality Date    TYMPANOPLASTY Right         No Known Allergies     History reviewed. No pertinent family history.     Patient's past medical history, surgical history, family history, medications, and allergies  were all reviewed and updated as appropriate today.    Review of Systems   Constitutional:  Negative for fever.   HENT:  Positive for hearing loss (chronic, R). Negative for ear pain and sore throat.    Respiratory:  Negative for cough and shortness of breath.    Cardiovascular:  Negative for chest  [facility] No

## 2024-06-13 NOTE — CONSULT NOTE ADULT - REASON FOR ADMISSION
EYE SURGERY      OTHER SURGICAL HISTORY      ECT      Not in a hospital admission.  No Known Allergies   Social History     Tobacco Use    Smoking status: Never    Smokeless tobacco: Never   Substance Use Topics    Alcohol use: No      Family History   Problem Relation Age of Onset    Colon Cancer Father     Colon Cancer Sister           Objective:       Mental Status Evaluation:  Appearance:  Wearing gown, on stretcher, good groomed   Behavior:  Cooperative, pleasant   Speech:  Normal rate, low volume and tone   Mood:  \"Good\"   Affect:  Congruent,    Thought Process:  Coherent, linear, no thought blocking   Thought Content:  Denies suicidal ideation, no reports of self-injurious behavior   Sensorium:  Does not appear to attend to internal stimuli   Cognition:  Oriented to person, place and general circumstance   Insight:  Improved   Judgment:  Improving     Assessment:     Diagnosis: Major depressive disorder, recurrent, severe with psychosis    Plan:     Continue bilateral ECT, continue treatment every 4 weeks  Monitor for stability in symptoms    Update consent and H&P every 30 days while undergoing ECT treatment, update labs every 6 months.   Patient verbalizes understanding of risks/benefits/alternatives to ECT.   Informed consent obtained 6/12/2024.  
syncope
LE angiography
Hg 4

## 2024-08-11 NOTE — ED ADULT NURSE NOTE - IN THE PAST 12 MONTHS HAVE YOU USED DRUGS OTHER THAN THOSE REQUIRED FOR MEDICAL REASON?
[facility] Consult Note      Patient Name: Javi Doran  : 1961  MRN: [id number]  Primary Care Physician:  Ginette Bryant MD  Referring Physician: Tai Rodriguez DO  Date of admission: 8/10/2024    Subjective     Reason for Consult:  hypoK and EDMOND    HPI:   Javi Doran is a 63 y.o. male with usually well-preserved renal function, alcoholism, chronic CHF, and multiple admissions already this year to [facility], admitted today for further evaluation of shortness of breath and leg swelling.  SCR 1.6 on arrival, and potassium 1.8 (with magnesium 2.0).  CXR with mild central pulmonary vascular engorgement.  Full PMH outlined below; he recalls having had low potassium levels in the past.  Medication list indicates he is taking furosemide 4 mg twice daily as well as a potassium supplement, but he reports not taking any potassium at home.    States that he takes diuretics at home, but cannot tell me name or dose  Drinks alcohol regularly, but states last drink was several days ago  Describes chronic leg swelling and chronic orthopnea, with each stable  He believes his weight is risen, but cannot quantify; states that he had recent weight loss following hospitalization at [facility] a few weeks ago  No chest pain  Requesting Ativan for sleep    Review of Systems:   14 point review of systems is otherwise negative except for mentioned above on HPI    Personal History     Past Medical History:   Diagnosis Date    Alcoholism     Atrial fibrillation     Constipation     Depression     Depression with anxiety     Obstructive hypertrophic cardiomyopathy     Persistent insomnia     Solitary pulmonary nodule on lung CT        Past Surgical History:   Procedure Laterality Date    ADENOIDECTOMY      CARDIAC SURGERY      HEMORRHOIDECTOMY      OTHER SURGICAL HISTORY      PTCA: DIAGNOSTIC CATH CORONARY DOMINANCE    OTHER SURGICAL HISTORY      TONSILLECTOMY WITH ADENOIDECTOMY     TONSILLECTOMY         Family History: family history includes Cardiomyopathy in his brother.    Social History:  reports that he has been smoking cigarettes. He does not have any smokeless tobacco history on file. He reports current alcohol use. He reports that he does not use drugs.    Home Medications:  Prior to Admission medications    Medication Sig Start Date End Date Taking? Authorizing Provider   amiodarone (PACERONE) 200 MG tablet Take 1 tablet by mouth Daily.   Yes Chelsi Reyes MD   atorvastatin (LIPITOR) 80 MG tablet Take 1 tablet by mouth Every Night.   Yes Chelsi Reyes MD   furosemide (LASIX) 20 MG tablet Take 2 tablets by mouth 2 (Two) Times a Day.   Yes Chelsi Reyes MD   losartan (COZAAR) 25 MG tablet Take 1 tablet by mouth Daily.   Yes Chelsi Reyes MD   metoprolol tartrate (LOPRESSOR) 25 MG tablet Take 1 tablet by mouth 2 (Two) Times a Day.   Yes Chelsi Reyes MD   rivaroxaban (XARELTO) 15 MG tablet Take 1 tablet by mouth Daily With Dinner.   Yes Chelsi Reyes MD   sertraline (ZOLOFT) 100 MG tablet Take 1 tablet by mouth Every Night.   Yes Chelsi Reyes MD   spironolactone (ALDACTONE) 25 MG tablet Take 1 tablet by mouth Daily.   Yes Chelsi Reyes MD   albuterol sulfate  (90 Base) MCG/ACT inhaler Inhale 2 puffs Every 4 (Four) Hours As Needed for Wheezing.    Chelsi Reyes MD   busPIRone (BUSPAR) 15 MG tablet Take 1 tablet by mouth 2 (Two) Times a Day As Needed (anxiety). Has not used in the past 6 months but has if needed    Chelsi Reyes MD   potassium chloride 10 MEQ CR tablet Take 2 tablets by mouth 2 (Two) Times a Day. Has not filled in 3 months and has been using OTC products, however has not been consistently taking these.    Chelsi Reyes MD   Umeclidinium-Vilanterol (Anoro Ellipta) 62.5-25 MCG/ACT aerosol powder  inhaler Inhale 1 puff Daily.    Chelsi Reyes MD   atorvastatin (LIPITOR) 80  MG tablet Take 1 tablet by mouth nightly. 10/22/13 8/10/24  Duke Razo DO   atorvastatin (LIPITOR) 80 MG tablet Take  by mouth. 10/22/13 8/10/24  Chelsi Reyes MD   busPIRone (BUSPAR) 15 MG tablet Take  by mouth. 7/30/15 8/10/24  Chelsi Reyes MD   chlordiazePOXIDE (LIBRIUM) 25 MG capsule 1-2 tablets every 8 hours as needed 6/27/19 8/10/24  Hal Clay MD   clonazePAM (KlonoPIN) 0.5 MG tablet Take  by mouth As Needed. 6/19/14 8/10/24  Chelsi Reyes MD   docusate sodium (COLACE) 100 MG capsule Take 1 capsule by mouth nightly. 10/22/13 8/10/24  Duke Razo DO   furosemide (LASIX) 20 MG tablet Take 2 tablets by mouth 2 (two) times a day. 1/2/14 8/10/24  Duke Razo DO   gabapentin (NEURONTIN) 300 MG capsule Take 300 mg by mouth 3 (Three) Times a Day.  8/10/24  Chelsi Reyes MD   hydrOXYzine (ATARAX) 50 MG tablet Take 1 tablet by mouth nightly. 10/26/15 8/10/24  Duke Razo DO   LORazepam (ATIVAN) 0.5 MG tablet Take 1 tablet by mouth 2 (Two) Times a Day. 4/9/17 8/10/24  Cody Rojo MD   metaxalone (SKELAXIN) 800 MG tablet Take 1 tablet by mouth 3 (Three) Times a Day As Needed for Muscle Spasms for up to 15 doses. Do not operate machinery 8/6/17 8/10/24  Viviana Ken MD   metoprolol tartrate (LOPRESSOR) 25 MG tablet Take 1 tablet by mouth 2 (two) times a day. 10/22/13 8/10/24  Duke Razo DO   Multiple Vitamin (MULTIVITAMIN) capsule Take 1 capsule by mouth daily. 10/22/13 8/10/24  Duke Razo DO   ondansetron ODT (ZOFRAN ODT) 4 MG disintegrating tablet Take 1 tablet by mouth Every 8 (Eight) Hours As Needed for Nausea or Vomiting. 6/27/19 8/10/24  Hal Clay MD   pantoprazole (PROTONIX) 40 MG EC tablet Take 1 tablet by mouth daily. 10/22/13 8/10/24  Duke Razo DO   potassium chloride ER (K-TAB) 20 MEQ tablet controlled-release ER tablet Take  by mouth daily. 10/22/13 8/10/24  Provider, MD Chelsi   sertraline (ZOLOFT) 100 MG  tablet Take 2 tablets by mouth Every Night. 6/19/14 8/10/24  Duke Razo DO   warfarin (COUMADIN) 5 MG tablet Take 10 mg by mouth Daily. TAKE AS DIRECTED BY CARDIOLOGY    7.5 mg on wednesday 1/2/14 8/10/24  Duke Razo DO   warfarin (COUMADIN) 5 MG tablet Take 7.5 mg by mouth Daily. On wednesdays  8/10/24  Provider, MD Chelsi       Allergies:  No Known Allergies    Objective     Vitals:   Temp:  [97.2 °F (36.2 °C)-98.6 °F (37 °C)] 97.5 °F (36.4 °C)  Heart Rate:  [50-69] 56  Resp:  [16-18] 16  BP: ()/(35-77) 98/67    Intake/Output Summary (Last 24 hours) at 8/10/2024 2143  Last data filed at 8/10/2024 1511  Gross per 24 hour   Intake 50 ml   Output 150 ml   Net -100 ml       Physical Exam:   Constitutional: Awake, alert, no acute distress.  HEENT: Sclera anicteric, no conjunctival injection  Neck: Supple, no carotid bruit, trachea at midline, no JVD  Respiratory: Crackles in bases, few wheezes, nonlabored   Cardiovascular: RR, + ectopy, 2/6M  Gastrointestinal: BS +, soft, nontender, distended  : No palpable bladder  Musculoskeletal: +1-2 edema, no clubbing or cyanosis  Psychiatric: Appropriate affect, cooperative, oriented  Neurologic:  moving all extremities, normal speech and mental status  Skin: Warm and dry       Scheduled Meds:     amiodarone, 200 mg, Oral, Daily  arformoterol, 15 mcg, Nebulization, BID - RT   And  tiotropium bromide monohydrate, 2 puff, Inhalation, Daily - RT  atorvastatin, 80 mg, Oral, Nightly  metoprolol tartrate, 25 mg, Oral, BID  rivaroxaban, 15 mg, Oral, Daily With Dinner  sertraline, 100 mg, Oral, Nightly  sodium chloride, 10 mL, Intravenous, Q12H  spironolactone, 25 mg, Oral, BID  thiamine (B-1) IV, 500 mg, Intravenous, Once  [START ON 8/11/2024] thiamine, 100 mg, Oral, Daily      IV Meds:        Results Reviewed:   I have personally reviewed the results from the time of this admission to 8/10/2024 21:43 EDT     Lab Results   Component Value Date    GLUCOSE 132  (H) 08/10/2024    CALCIUM 8.6 08/10/2024     (L) 08/10/2024    K 1.8 (C) 08/10/2024    CO2 30.0 (H) 08/10/2024    CL 89 (L) 08/10/2024    BUN 21 08/10/2024    CREATININE 1.59 (H) 08/10/2024    EGFRIFAFRI 86 11/10/2023    EGFRIFNONA 71 11/10/2023    BCR 13.2 08/10/2024    ANIONGAP 15.0 08/10/2024      Lab Results   Component Value Date    MG 2.0 08/10/2024    PHOS 3.0 08/10/2024    ALBUMIN 4.1 08/10/2024           Assessment / Plan       Hypokalemia      ASSESSMENT:  1.  EDMOND, with UOP not recorded, likely prerenal from hypotension and CHF, with impaired renal autoregulation from ARB (if actually being taken).  Volume excess by exam and imaging; hypervolemic hyponatremia  2.  Severe hypokalemia, likely multifactorial:  diuretic and poor nutrition due to alcoholism.  Renal wasting a possibility.  Magnesium level normal  3.  Alcoholism  4.  Acute on chronic CHF  5.  COPD with tobacco abuse  6.  Hypotension  7.  Noncompliance    PLAN:  1.  Aggressive potassium replacement, both IV and oral  2.  Serial potassium levels until normal  3.  Agree with spironolactone 25 mg daily, provided blood pressures tolerate  4.  Hold loop diuretic until potassium level is normal  5.  Surveillance labs    Thank you for involving us in the care of Javi Doran.  Please feel free to call with any questions.    Kody Padilla MD  08/10/24  21:43 EDT    [facility]  [phone]      Please note that portions of this note were completed with a voice recognition program.   No

## 2024-09-09 NOTE — CHART NOTE - NSCHARTNOTEFT_GEN_A_CORE
#20 gauge IV placed to the right forearm cephalic vein  good flow, + blood return    - cleared for use 09-Sep-2024 14:46

## 2024-10-23 NOTE — ED PROVIDER NOTE - CPE EDP PSYCH NORM
T(C): 37.2 (10-23-24 @ 04:18), Max: 37.8 (10-22-24 @ 18:22)  T(F): 99 (10-23-24 @ 04:18), Max: 100.1 (10-22-24 @ 18:22)  HR: 103 (10-23-24 @ 04:18) (98 - 121)  BP: 120/61 (10-23-24 @ 04:18) (101/64 - 145/76)  RR: 18 (10-23-24 @ 04:18) (14 - 18)  SpO2: 96% (10-23-24 @ 04:18) (91% - 98%)  Wt(kg): --    PHYSICAL EXAM:  GENERAL: Clinically well-appearing and comfortable  HEAD:  Atraumatic, Normocephalic  EYES: EOMI, PERRL, conjunctiva and sclera clear  NECK: Supple, No JVD  CHEST/LUNG: Clear to auscultation bilaterally; No wheeze  HEART: Regular rate and rhythm; No murmurs, rubs, or gallops  ABDOMEN: Soft, Nontender, Nondistended; Bowel sounds present  EXTREMITIES:  2+ Peripheral Pulses, No clubbing, cyanosis, or edema  PSYCH: Normal mood, Normal affect  NEUROLOGY: non-focal  SKIN: No rashes or lesions normal...

## 2024-11-22 NOTE — ED PROVIDER NOTE - CHPI ED SYMPTOMS POS
Rafia Andrews - Spouse - [phone]     malaise/FATIGUE

## 2024-12-09 NOTE — PATIENT PROFILE ADULT - ARE SIGNIFICANT INDICATORS COMPLETE.
Detail Level: Generalized Quality 226: Preventive Care And Screening: Tobacco Use: Screening And Cessation Intervention: Patient screened for tobacco use and is an ex/non-smoker Quality 47: Advance Care Plan: Advance Care Planning discussed and documented; advance care plan or surrogate decision maker documented in the medical record. Yes

## 2025-02-21 NOTE — CHART NOTE - NSCHARTNOTEFT_GEN_A_CORE
Addended by: ARNOLDO FRANCISCO on: 2/21/2025 08:20 AM     Modules accepted: Orders     MICU      case was discussed with   hematology and    daughter  agreement on  eliquis  2.5 bid monitor for bleed
